# Patient Record
Sex: FEMALE | Race: BLACK OR AFRICAN AMERICAN | NOT HISPANIC OR LATINO | Employment: PART TIME | URBAN - METROPOLITAN AREA
[De-identification: names, ages, dates, MRNs, and addresses within clinical notes are randomized per-mention and may not be internally consistent; named-entity substitution may affect disease eponyms.]

---

## 2019-06-13 ENCOUNTER — HOSPITAL ENCOUNTER (OUTPATIENT)
Dept: RADIOLOGY | Facility: HOSPITAL | Age: 49
Discharge: HOME | End: 2019-06-13
Attending: PODIATRIST
Payer: MEDICAID

## 2019-06-13 DIAGNOSIS — M79.672 LEFT FOOT PAIN: Primary | ICD-10-CM

## 2019-06-13 DIAGNOSIS — M79.672 LEFT FOOT PAIN: ICD-10-CM

## 2019-06-13 PROCEDURE — 73630 X-RAY EXAM OF FOOT: CPT | Mod: TC,FY,LT

## 2019-06-13 PROCEDURE — 73630 XR FOOT COMPLETE 3 VIEW LEFT: ICD-10-PCS | Mod: 26,LT,, | Performed by: RADIOLOGY

## 2019-06-13 PROCEDURE — 73630 X-RAY EXAM OF FOOT: CPT | Mod: 26,LT,, | Performed by: RADIOLOGY

## 2022-08-16 ENCOUNTER — OCCUPATIONAL HEALTH (OUTPATIENT)
Dept: URGENT CARE | Facility: CLINIC | Age: 52
End: 2022-08-16

## 2022-08-16 DIAGNOSIS — Z13.9 ENCOUNTER FOR SCREENING: Primary | ICD-10-CM

## 2022-08-16 PROCEDURE — 86580 TB INTRADERMAL TEST: CPT | Mod: S$GLB,,, | Performed by: PHYSICIAN ASSISTANT

## 2022-08-16 PROCEDURE — 86580 POCT TB SKIN TEST: ICD-10-PCS | Mod: S$GLB,,, | Performed by: PHYSICIAN ASSISTANT

## 2022-08-18 LAB
TB INDURATION - 48 HR READ: 0 MM
TB INDURATION - 72 HR READ: 0 MM
TB SKIN TEST - 48 HR READ: NEGATIVE
TB SKIN TEST - 72 HR READ: NEGATIVE

## 2024-01-29 ENCOUNTER — ANESTHESIA (OUTPATIENT)
Dept: SURGERY | Facility: HOSPITAL | Age: 54
DRG: 004 | End: 2024-01-29
Payer: MEDICAID

## 2024-01-29 ENCOUNTER — HOSPITAL ENCOUNTER (INPATIENT)
Facility: HOSPITAL | Age: 54
LOS: 36 days | Discharge: NURSING, REHAB OR LONG-TERM CARE | DRG: 004 | End: 2024-03-05
Attending: EMERGENCY MEDICINE | Admitting: INTERNAL MEDICINE
Payer: MEDICAID

## 2024-01-29 ENCOUNTER — ANESTHESIA EVENT (OUTPATIENT)
Dept: SURGERY | Facility: HOSPITAL | Age: 54
DRG: 004 | End: 2024-01-29
Payer: MEDICAID

## 2024-01-29 DIAGNOSIS — J96.01 ACUTE HYPOXEMIC RESPIRATORY FAILURE: ICD-10-CM

## 2024-01-29 DIAGNOSIS — I38 ENDOCARDITIS, UNSPECIFIED CHRONICITY, UNSPECIFIED ENDOCARDITIS TYPE: ICD-10-CM

## 2024-01-29 DIAGNOSIS — G93.40 ENCEPHALOPATHY: ICD-10-CM

## 2024-01-29 DIAGNOSIS — R50.9 FEVER, UNSPECIFIED FEVER CAUSE: ICD-10-CM

## 2024-01-29 DIAGNOSIS — E11.9 NEW ONSET TYPE 2 DIABETES MELLITUS: ICD-10-CM

## 2024-01-29 DIAGNOSIS — N17.0 ACUTE RENAL FAILURE WITH TUBULAR NECROSIS: ICD-10-CM

## 2024-01-29 DIAGNOSIS — I63.10 CEREBROVASCULAR ACCIDENT (CVA) DUE TO EMBOLISM OF PRECEREBRAL ARTERY: ICD-10-CM

## 2024-01-29 DIAGNOSIS — I63.9 STROKE: ICD-10-CM

## 2024-01-29 DIAGNOSIS — I63.9 CEREBRAL INFARCTION, UNSPECIFIED MECHANISM: ICD-10-CM

## 2024-01-29 DIAGNOSIS — G93.41 ENCEPHALOPATHY, METABOLIC: ICD-10-CM

## 2024-01-29 DIAGNOSIS — R94.31 PROLONGED QT INTERVAL: ICD-10-CM

## 2024-01-29 DIAGNOSIS — Z99.11 ENCOUNTER FOR WEANING FROM VENTILATOR: ICD-10-CM

## 2024-01-29 DIAGNOSIS — E13.10 DIABETIC KETOACIDOSIS WITHOUT COMA ASSOCIATED WITH OTHER SPECIFIED DIABETES MELLITUS: Primary | ICD-10-CM

## 2024-01-29 DIAGNOSIS — E66.01 MORBID (SEVERE) OBESITY DUE TO EXCESS CALORIES: ICD-10-CM

## 2024-01-29 DIAGNOSIS — R94.31 ST ELEVATION: ICD-10-CM

## 2024-01-29 DIAGNOSIS — N17.9 AKI (ACUTE KIDNEY INJURY): ICD-10-CM

## 2024-01-29 DIAGNOSIS — R00.0 TACHYCARDIA: ICD-10-CM

## 2024-01-29 DIAGNOSIS — N49.3 FOURNIER'S GANGRENE: ICD-10-CM

## 2024-01-29 DIAGNOSIS — I63.9 CEREBRAL INFARCT: ICD-10-CM

## 2024-01-29 DIAGNOSIS — Z99.11 ON MECHANICALLY ASSISTED VENTILATION: ICD-10-CM

## 2024-01-29 DIAGNOSIS — D72.829 LEUKOCYTOSIS, UNSPECIFIED TYPE: ICD-10-CM

## 2024-01-29 DIAGNOSIS — E87.20 METABOLIC ACIDOSIS: ICD-10-CM

## 2024-01-29 DIAGNOSIS — D62 ACUTE BLOOD LOSS ANEMIA: ICD-10-CM

## 2024-01-29 PROBLEM — E11.10 DIABETIC ACIDOSIS WITHOUT COMA: Status: ACTIVE | Noted: 2024-01-29

## 2024-01-29 PROBLEM — R65.20 SEVERE SEPSIS: Status: ACTIVE | Noted: 2024-01-29

## 2024-01-29 PROBLEM — A41.9 SEVERE SEPSIS: Status: ACTIVE | Noted: 2024-01-29

## 2024-01-29 LAB
ALBUMIN SERPL BCP-MCNC: 2.6 G/DL (ref 3.5–5.2)
ALLENS TEST: ABNORMAL
ALLENS TEST: ABNORMAL
ALP SERPL-CCNC: 151 U/L (ref 55–135)
ALT SERPL W/O P-5'-P-CCNC: 30 U/L (ref 10–44)
ANION GAP SERPL CALC-SCNC: 19 MMOL/L (ref 8–16)
AST SERPL-CCNC: 40 U/L (ref 10–40)
B-OH-BUTYR BLD STRIP-SCNC: 3.2 MMOL/L (ref 0–0.5)
BASOPHILS # BLD AUTO: 0.13 K/UL (ref 0–0.2)
BASOPHILS NFR BLD: 0.6 % (ref 0–1.9)
BILIRUB SERPL-MCNC: 0.7 MG/DL (ref 0.1–1)
BUN SERPL-MCNC: 44 MG/DL (ref 6–20)
CALCIUM SERPL-MCNC: 8.9 MG/DL (ref 8.7–10.5)
CHLORIDE SERPL-SCNC: 94 MMOL/L (ref 95–110)
CO2 SERPL-SCNC: 17 MMOL/L (ref 23–29)
CREAT SERPL-MCNC: 3.8 MG/DL (ref 0.5–1.4)
CRP SERPL-MCNC: 530.2 MG/L (ref 0–8.2)
CTP QC/QA: YES
CTP QC/QA: YES
DELSYS: ABNORMAL
DELSYS: ABNORMAL
DIFFERENTIAL METHOD BLD: ABNORMAL
EOSINOPHIL # BLD AUTO: 0.1 K/UL (ref 0–0.5)
EOSINOPHIL NFR BLD: 0.5 % (ref 0–8)
ERYTHROCYTE [DISTWIDTH] IN BLOOD BY AUTOMATED COUNT: 14.9 % (ref 11.5–14.5)
EST. GFR  (NO RACE VARIABLE): 14 ML/MIN/1.73 M^2
GLUCOSE SERPL-MCNC: 824 MG/DL (ref 70–110)
HCO3 UR-SCNC: 19.8 MMOL/L (ref 24–28)
HCT VFR BLD AUTO: 38.5 % (ref 37–48.5)
HGB BLD-MCNC: 12 G/DL (ref 12–16)
IMM GRANULOCYTES # BLD AUTO: 0.25 K/UL (ref 0–0.04)
IMM GRANULOCYTES NFR BLD AUTO: 1.2 % (ref 0–0.5)
LDH SERPL L TO P-CCNC: 5.64 MMOL/L (ref 0.5–2.2)
LYMPHOCYTES # BLD AUTO: 1.2 K/UL (ref 1–4.8)
LYMPHOCYTES NFR BLD: 5.6 % (ref 18–48)
MCH RBC QN AUTO: 26.1 PG (ref 27–31)
MCHC RBC AUTO-ENTMCNC: 31.2 G/DL (ref 32–36)
MCV RBC AUTO: 84 FL (ref 82–98)
MONOCYTES # BLD AUTO: 0.9 K/UL (ref 0.3–1)
MONOCYTES NFR BLD: 4.3 % (ref 4–15)
NEUTROPHILS # BLD AUTO: 19 K/UL (ref 1.8–7.7)
NEUTROPHILS NFR BLD: 87.8 % (ref 38–73)
NRBC BLD-RTO: 0 /100 WBC
PCO2 BLDA: 40.9 MMHG (ref 35–45)
PH SMN: 7.29 [PH] (ref 7.35–7.45)
PLATELET # BLD AUTO: 217 K/UL (ref 150–450)
PMV BLD AUTO: 11.5 FL (ref 9.2–12.9)
PO2 BLDA: 28 MMHG (ref 40–60)
POC BE: -6 MMOL/L
POC MOLECULAR INFLUENZA A AGN: NEGATIVE
POC MOLECULAR INFLUENZA B AGN: NEGATIVE
POC SATURATED O2: 46 % (ref 95–100)
POC TCO2: 21 MMOL/L (ref 24–29)
POCT GLUCOSE: >500 MG/DL (ref 70–110)
POTASSIUM SERPL-SCNC: 4.2 MMOL/L (ref 3.5–5.1)
PROT SERPL-MCNC: 8 G/DL (ref 6–8.4)
RBC # BLD AUTO: 4.59 M/UL (ref 4–5.4)
SAMPLE: ABNORMAL
SAMPLE: ABNORMAL
SARS-COV-2 RDRP RESP QL NAA+PROBE: NEGATIVE
SITE: ABNORMAL
SITE: ABNORMAL
SODIUM SERPL-SCNC: 130 MMOL/L (ref 136–145)
WBC # BLD AUTO: 21.6 K/UL (ref 3.9–12.7)

## 2024-01-29 PROCEDURE — 86140 C-REACTIVE PROTEIN: CPT | Performed by: EMERGENCY MEDICINE

## 2024-01-29 PROCEDURE — 93005 ELECTROCARDIOGRAM TRACING: CPT

## 2024-01-29 PROCEDURE — 25000003 PHARM REV CODE 250: Performed by: EMERGENCY MEDICINE

## 2024-01-29 PROCEDURE — 83605 ASSAY OF LACTIC ACID: CPT

## 2024-01-29 PROCEDURE — 63600175 PHARM REV CODE 636 W HCPCS: Performed by: EMERGENCY MEDICINE

## 2024-01-29 PROCEDURE — 11005 DBRDMT SKIN ABDOMINAL WALL: CPT | Mod: ,,, | Performed by: SURGERY

## 2024-01-29 PROCEDURE — D9220A PRA ANESTHESIA: Mod: CRNA,,, | Performed by: NURSE ANESTHETIST, CERTIFIED REGISTERED

## 2024-01-29 PROCEDURE — 85025 COMPLETE CBC W/AUTO DIFF WBC: CPT | Performed by: EMERGENCY MEDICINE

## 2024-01-29 PROCEDURE — 63600175 PHARM REV CODE 636 W HCPCS: Performed by: NURSE ANESTHETIST, CERTIFIED REGISTERED

## 2024-01-29 PROCEDURE — 87502 INFLUENZA DNA AMP PROBE: CPT

## 2024-01-29 PROCEDURE — 93010 ELECTROCARDIOGRAM REPORT: CPT | Mod: ,,, | Performed by: INTERNAL MEDICINE

## 2024-01-29 PROCEDURE — 36000704 HC OR TIME LEV I 1ST 15 MIN: Performed by: SURGERY

## 2024-01-29 PROCEDURE — 96375 TX/PRO/DX INJ NEW DRUG ADDON: CPT

## 2024-01-29 PROCEDURE — D9220A PRA ANESTHESIA: Mod: ANES,,, | Performed by: ANESTHESIOLOGY

## 2024-01-29 PROCEDURE — 99900035 HC TECH TIME PER 15 MIN (STAT)

## 2024-01-29 PROCEDURE — 36000705 HC OR TIME LEV I EA ADD 15 MIN: Performed by: SURGERY

## 2024-01-29 PROCEDURE — 25000003 PHARM REV CODE 250: Performed by: NURSE ANESTHETIST, CERTIFIED REGISTERED

## 2024-01-29 PROCEDURE — 37000008 HC ANESTHESIA 1ST 15 MINUTES: Performed by: SURGERY

## 2024-01-29 PROCEDURE — 87635 SARS-COV-2 COVID-19 AMP PRB: CPT | Performed by: EMERGENCY MEDICINE

## 2024-01-29 PROCEDURE — 80053 COMPREHEN METABOLIC PANEL: CPT | Performed by: EMERGENCY MEDICINE

## 2024-01-29 PROCEDURE — 37000009 HC ANESTHESIA EA ADD 15 MINS: Performed by: SURGERY

## 2024-01-29 PROCEDURE — 82010 KETONE BODYS QUAN: CPT | Performed by: EMERGENCY MEDICINE

## 2024-01-29 PROCEDURE — 99285 EMERGENCY DEPT VISIT HI MDM: CPT | Mod: 25

## 2024-01-29 PROCEDURE — 99223 1ST HOSP IP/OBS HIGH 75: CPT | Mod: 25,,, | Performed by: SURGERY

## 2024-01-29 PROCEDURE — 87040 BLOOD CULTURE FOR BACTERIA: CPT | Mod: 59 | Performed by: EMERGENCY MEDICINE

## 2024-01-29 PROCEDURE — 82803 BLOOD GASES ANY COMBINATION: CPT

## 2024-01-29 PROCEDURE — 82962 GLUCOSE BLOOD TEST: CPT

## 2024-01-29 PROCEDURE — 20000000 HC ICU ROOM

## 2024-01-29 PROCEDURE — 96365 THER/PROPH/DIAG IV INF INIT: CPT

## 2024-01-29 RX ORDER — SUCCINYLCHOLINE CHLORIDE 20 MG/ML
INJECTION INTRAMUSCULAR; INTRAVENOUS
Status: DISCONTINUED | OUTPATIENT
Start: 2024-01-29 | End: 2024-01-30

## 2024-01-29 RX ORDER — DEXTROSE MONOHYDRATE 100 MG/ML
INJECTION, SOLUTION INTRAVENOUS
Status: DISCONTINUED | OUTPATIENT
Start: 2024-01-29 | End: 2024-02-02

## 2024-01-29 RX ORDER — SODIUM CHLORIDE 0.9 % (FLUSH) 0.9 %
10 SYRINGE (ML) INJECTION
Status: DISCONTINUED | OUTPATIENT
Start: 2024-01-29 | End: 2024-01-30

## 2024-01-29 RX ORDER — MORPHINE SULFATE 4 MG/ML
4 INJECTION, SOLUTION INTRAMUSCULAR; INTRAVENOUS
Status: COMPLETED | OUTPATIENT
Start: 2024-01-29 | End: 2024-01-29

## 2024-01-29 RX ORDER — CLINDAMYCIN PHOSPHATE 900 MG/50ML
900 INJECTION, SOLUTION INTRAVENOUS
Status: COMPLETED | OUTPATIENT
Start: 2024-01-29 | End: 2024-01-29

## 2024-01-29 RX ORDER — MIDAZOLAM HYDROCHLORIDE 1 MG/ML
INJECTION, SOLUTION INTRAMUSCULAR; INTRAVENOUS
Status: DISCONTINUED | OUTPATIENT
Start: 2024-01-29 | End: 2024-01-30

## 2024-01-29 RX ORDER — PROPOFOL 10 MG/ML
VIAL (ML) INTRAVENOUS
Status: DISCONTINUED | OUTPATIENT
Start: 2024-01-29 | End: 2024-01-30

## 2024-01-29 RX ORDER — ROCURONIUM BROMIDE 10 MG/ML
INJECTION, SOLUTION INTRAVENOUS
Status: DISCONTINUED | OUTPATIENT
Start: 2024-01-29 | End: 2024-01-30

## 2024-01-29 RX ORDER — PROCHLORPERAZINE EDISYLATE 5 MG/ML
INJECTION INTRAMUSCULAR; INTRAVENOUS
Status: DISCONTINUED | OUTPATIENT
Start: 2024-01-29 | End: 2024-01-30

## 2024-01-29 RX ORDER — DEXTROSE MONOHYDRATE AND SODIUM CHLORIDE 5; .45 G/100ML; G/100ML
INJECTION, SOLUTION INTRAVENOUS CONTINUOUS PRN
Status: DISCONTINUED | OUTPATIENT
Start: 2024-01-29 | End: 2024-01-30

## 2024-01-29 RX ORDER — FENTANYL CITRATE 50 UG/ML
INJECTION, SOLUTION INTRAMUSCULAR; INTRAVENOUS
Status: DISCONTINUED | OUTPATIENT
Start: 2024-01-29 | End: 2024-01-30

## 2024-01-29 RX ORDER — LIDOCAINE HYDROCHLORIDE 20 MG/ML
INJECTION INTRAVENOUS
Status: DISCONTINUED | OUTPATIENT
Start: 2024-01-29 | End: 2024-01-30

## 2024-01-29 RX ORDER — SODIUM CHLORIDE 9 MG/ML
1000 INJECTION, SOLUTION INTRAVENOUS CONTINUOUS
Status: DISCONTINUED | OUTPATIENT
Start: 2024-01-29 | End: 2024-01-30

## 2024-01-29 RX ORDER — ONDANSETRON HYDROCHLORIDE 2 MG/ML
INJECTION, SOLUTION INTRAVENOUS
Status: DISCONTINUED | OUTPATIENT
Start: 2024-01-29 | End: 2024-01-30

## 2024-01-29 RX ORDER — ONDANSETRON HYDROCHLORIDE 2 MG/ML
4 INJECTION, SOLUTION INTRAVENOUS
Status: COMPLETED | OUTPATIENT
Start: 2024-01-29 | End: 2024-01-29

## 2024-01-29 RX ADMIN — SUGAMMADEX 200 MG: 100 INJECTION, SOLUTION INTRAVENOUS at 11:01

## 2024-01-29 RX ADMIN — ONDANSETRON 4 MG: 2 INJECTION INTRAMUSCULAR; INTRAVENOUS at 07:01

## 2024-01-29 RX ADMIN — SODIUM CHLORIDE: 0.9 INJECTION, SOLUTION INTRAVENOUS at 10:01

## 2024-01-29 RX ADMIN — ONDANSETRON 4 MG: 2 INJECTION, SOLUTION INTRAMUSCULAR; INTRAVENOUS at 10:01

## 2024-01-29 RX ADMIN — CLINDAMYCIN PHOSPHATE 900 MG: 900 INJECTION, SOLUTION INTRAVENOUS at 09:01

## 2024-01-29 RX ADMIN — FENTANYL CITRATE 50 MCG: 0.05 INJECTION, SOLUTION INTRAMUSCULAR; INTRAVENOUS at 10:01

## 2024-01-29 RX ADMIN — PIPERACILLIN AND TAZOBACTAM 4.5 G: 4; .5 INJECTION, POWDER, LYOPHILIZED, FOR SOLUTION INTRAVENOUS; PARENTERAL at 07:01

## 2024-01-29 RX ADMIN — MIDAZOLAM HYDROCHLORIDE 2 MG: 1 INJECTION, SOLUTION INTRAMUSCULAR; INTRAVENOUS at 10:01

## 2024-01-29 RX ADMIN — PROPOFOL 200 MG: 10 INJECTION, EMULSION INTRAVENOUS at 10:01

## 2024-01-29 RX ADMIN — ROCURONIUM BROMIDE 30 MG: 10 INJECTION, SOLUTION INTRAVENOUS at 10:01

## 2024-01-29 RX ADMIN — SODIUM CHLORIDE 1000 ML: 0.9 INJECTION, SOLUTION INTRAVENOUS at 09:01

## 2024-01-29 RX ADMIN — LIDOCAINE HYDROCHLORIDE 100 MG: 20 INJECTION, SOLUTION INTRAVENOUS at 10:01

## 2024-01-29 RX ADMIN — PROCHLORPERAZINE EDISYLATE 5 MG: 5 INJECTION INTRAMUSCULAR; INTRAVENOUS at 10:01

## 2024-01-29 RX ADMIN — SODIUM CHLORIDE: 0.9 INJECTION, SOLUTION INTRAVENOUS at 11:01

## 2024-01-29 RX ADMIN — INSULIN HUMAN 0.1 UNITS/KG/HR: 1 INJECTION, SOLUTION INTRAVENOUS at 09:01

## 2024-01-29 RX ADMIN — SUCCINYLCHOLINE CHLORIDE 200 MG: 20 INJECTION, SOLUTION INTRAMUSCULAR; INTRAVENOUS at 10:01

## 2024-01-29 RX ADMIN — SODIUM CHLORIDE 1779 ML: 9 INJECTION, SOLUTION INTRAVENOUS at 07:01

## 2024-01-29 RX ADMIN — MORPHINE SULFATE 4 MG: 4 INJECTION, SOLUTION INTRAMUSCULAR; INTRAVENOUS at 07:01

## 2024-01-29 NOTE — Clinical Note
Diagnosis: Diabetic ketoacidosis without coma associated with other specified diabetes mellitus [1495572]   Future Attending Provider: PRIMITIVO BRUNSON [[id number]]   Reason for IP Medical Treatment  (Clinical interventions that can only be accomplished in the IP setting? ) :: iv insulin, abx, surgery   I certify that Inpatient services for greater than or equal to 2 midnights are medically necessary:: Yes   Plans for Post-Acute care--if anticipated (pick the single best option):: C. Discharge home with home health services   Special Needs:: No Special Needs [1]   [facility] Medicine Provider Team:: Staff

## 2024-01-30 ENCOUNTER — ANESTHESIA EVENT (OUTPATIENT)
Dept: SURGERY | Facility: HOSPITAL | Age: 54
DRG: 004 | End: 2024-01-30
Payer: MEDICAID

## 2024-01-30 PROBLEM — N17.9 AKI (ACUTE KIDNEY INJURY): Status: ACTIVE | Noted: 2024-01-30

## 2024-01-30 PROBLEM — E87.1 HYPONATREMIA: Status: ACTIVE | Noted: 2024-01-30

## 2024-01-30 LAB
ALLENS TEST: ABNORMAL
ALLENS TEST: ABNORMAL
ANION GAP SERPL CALC-SCNC: 11 MMOL/L (ref 8–16)
ANION GAP SERPL CALC-SCNC: 12 MMOL/L (ref 8–16)
ANION GAP SERPL CALC-SCNC: 14 MMOL/L (ref 8–16)
ANION GAP SERPL CALC-SCNC: 14 MMOL/L (ref 8–16)
ANION GAP SERPL CALC-SCNC: 16 MMOL/L (ref 8–16)
ANION GAP SERPL CALC-SCNC: 9 MMOL/L (ref 8–16)
B-OH-BUTYR BLD STRIP-SCNC: 0.8 MMOL/L (ref 0–0.5)
BACTERIA #/AREA URNS HPF: ABNORMAL /HPF
BASOPHILS # BLD AUTO: 0.04 K/UL (ref 0–0.2)
BASOPHILS # BLD AUTO: 0.04 K/UL (ref 0–0.2)
BASOPHILS NFR BLD: 0.2 % (ref 0–1.9)
BASOPHILS NFR BLD: 0.3 % (ref 0–1.9)
BILIRUB UR QL STRIP: NEGATIVE
BUN SERPL-MCNC: 44 MG/DL (ref 6–20)
BUN SERPL-MCNC: 46 MG/DL (ref 6–20)
BUN SERPL-MCNC: 47 MG/DL (ref 6–20)
BUN SERPL-MCNC: 47 MG/DL (ref 6–20)
BUN SERPL-MCNC: 50 MG/DL (ref 6–20)
BUN SERPL-MCNC: 50 MG/DL (ref 6–20)
CALCIUM SERPL-MCNC: 7.5 MG/DL (ref 8.7–10.5)
CALCIUM SERPL-MCNC: 7.7 MG/DL (ref 8.7–10.5)
CALCIUM SERPL-MCNC: 7.9 MG/DL (ref 8.7–10.5)
CALCIUM SERPL-MCNC: 8.1 MG/DL (ref 8.7–10.5)
CALCIUM SERPL-MCNC: 8.3 MG/DL (ref 8.7–10.5)
CALCIUM SERPL-MCNC: 8.3 MG/DL (ref 8.7–10.5)
CHLORIDE SERPL-SCNC: 103 MMOL/L (ref 95–110)
CHLORIDE SERPL-SCNC: 103 MMOL/L (ref 95–110)
CHLORIDE SERPL-SCNC: 105 MMOL/L (ref 95–110)
CHLORIDE SERPL-SCNC: 105 MMOL/L (ref 95–110)
CHLORIDE SERPL-SCNC: 106 MMOL/L (ref 95–110)
CHLORIDE SERPL-SCNC: 109 MMOL/L (ref 95–110)
CLARITY UR: ABNORMAL
CO2 SERPL-SCNC: 15 MMOL/L (ref 23–29)
CO2 SERPL-SCNC: 16 MMOL/L (ref 23–29)
CO2 SERPL-SCNC: 17 MMOL/L (ref 23–29)
CO2 SERPL-SCNC: 17 MMOL/L (ref 23–29)
CO2 SERPL-SCNC: 18 MMOL/L (ref 23–29)
CO2 SERPL-SCNC: 20 MMOL/L (ref 23–29)
COLOR UR: ABNORMAL
CREAT SERPL-MCNC: 3.5 MG/DL (ref 0.5–1.4)
CREAT SERPL-MCNC: 3.6 MG/DL (ref 0.5–1.4)
CREAT SERPL-MCNC: 3.8 MG/DL (ref 0.5–1.4)
CREAT SERPL-MCNC: 3.9 MG/DL (ref 0.5–1.4)
CREAT SERPL-MCNC: 4.4 MG/DL (ref 0.5–1.4)
CREAT SERPL-MCNC: 4.6 MG/DL (ref 0.5–1.4)
CREAT UR-MCNC: 274.8 MG/DL (ref 15–325)
DELSYS: ABNORMAL
DELSYS: ABNORMAL
DIFFERENTIAL METHOD BLD: ABNORMAL
DIFFERENTIAL METHOD BLD: ABNORMAL
EOSINOPHIL # BLD AUTO: 0 K/UL (ref 0–0.5)
EOSINOPHIL # BLD AUTO: 0 K/UL (ref 0–0.5)
EOSINOPHIL NFR BLD: 0.1 % (ref 0–8)
EOSINOPHIL NFR BLD: 0.1 % (ref 0–8)
ERYTHROCYTE [DISTWIDTH] IN BLOOD BY AUTOMATED COUNT: 14.6 % (ref 11.5–14.5)
ERYTHROCYTE [DISTWIDTH] IN BLOOD BY AUTOMATED COUNT: 14.8 % (ref 11.5–14.5)
EST. GFR  (NO RACE VARIABLE): 11 ML/MIN/1.73 M^2
EST. GFR  (NO RACE VARIABLE): 11 ML/MIN/1.73 M^2
EST. GFR  (NO RACE VARIABLE): 13 ML/MIN/1.73 M^2
EST. GFR  (NO RACE VARIABLE): 14 ML/MIN/1.73 M^2
EST. GFR  (NO RACE VARIABLE): 14 ML/MIN/1.73 M^2
EST. GFR  (NO RACE VARIABLE): 15 ML/MIN/1.73 M^2
ESTIMATED AVG GLUCOSE: 252 MG/DL (ref 68–131)
FLOW: 2
GLUCOSE SERPL-MCNC: 125 MG/DL (ref 70–110)
GLUCOSE SERPL-MCNC: 137 MG/DL (ref 70–110)
GLUCOSE SERPL-MCNC: 167 MG/DL (ref 70–110)
GLUCOSE SERPL-MCNC: 198 MG/DL (ref 70–110)
GLUCOSE SERPL-MCNC: 412 MG/DL (ref 70–110)
GLUCOSE SERPL-MCNC: 571 MG/DL (ref 70–110)
GLUCOSE UR QL STRIP: ABNORMAL
GRAM STN SPEC: NORMAL
HBA1C MFR BLD: 10.4 % (ref 4–5.6)
HCO3 UR-SCNC: 19.9 MMOL/L (ref 24–28)
HCO3 UR-SCNC: 21.6 MMOL/L (ref 24–28)
HCT VFR BLD AUTO: 37.9 % (ref 37–48.5)
HCT VFR BLD AUTO: 42.1 % (ref 37–48.5)
HGB BLD-MCNC: 12 G/DL (ref 12–16)
HGB BLD-MCNC: 13 G/DL (ref 12–16)
HGB UR QL STRIP: ABNORMAL
HYALINE CASTS #/AREA URNS LPF: 0 /LPF
IMM GRANULOCYTES # BLD AUTO: 0.11 K/UL (ref 0–0.04)
IMM GRANULOCYTES # BLD AUTO: 0.12 K/UL (ref 0–0.04)
IMM GRANULOCYTES NFR BLD AUTO: 0.6 % (ref 0–0.5)
IMM GRANULOCYTES NFR BLD AUTO: 0.8 % (ref 0–0.5)
KETONES UR QL STRIP: ABNORMAL
LACTATE SERPL-SCNC: 2.5 MMOL/L (ref 0.5–2.2)
LACTATE SERPL-SCNC: 2.6 MMOL/L (ref 0.5–2.2)
LACTATE SERPL-SCNC: 4.9 MMOL/L (ref 0.5–2.2)
LEUKOCYTE ESTERASE UR QL STRIP: NEGATIVE
LYMPHOCYTES # BLD AUTO: 1.3 K/UL (ref 1–4.8)
LYMPHOCYTES # BLD AUTO: 1.3 K/UL (ref 1–4.8)
LYMPHOCYTES NFR BLD: 7.1 % (ref 18–48)
LYMPHOCYTES NFR BLD: 9 % (ref 18–48)
MCH RBC QN AUTO: 25.8 PG (ref 27–31)
MCH RBC QN AUTO: 26.4 PG (ref 27–31)
MCHC RBC AUTO-ENTMCNC: 30.9 G/DL (ref 32–36)
MCHC RBC AUTO-ENTMCNC: 31.7 G/DL (ref 32–36)
MCV RBC AUTO: 84 FL (ref 82–98)
MCV RBC AUTO: 84 FL (ref 82–98)
MICROSCOPIC COMMENT: ABNORMAL
MODE: ABNORMAL
MONOCYTES # BLD AUTO: 0.5 K/UL (ref 0.3–1)
MONOCYTES # BLD AUTO: 0.5 K/UL (ref 0.3–1)
MONOCYTES NFR BLD: 2.8 % (ref 4–15)
MONOCYTES NFR BLD: 3.5 % (ref 4–15)
NEUTROPHILS # BLD AUTO: 12.7 K/UL (ref 1.8–7.7)
NEUTROPHILS # BLD AUTO: 15.8 K/UL (ref 1.8–7.7)
NEUTROPHILS NFR BLD: 86.3 % (ref 38–73)
NEUTROPHILS NFR BLD: 89.2 % (ref 38–73)
NITRITE UR QL STRIP: NEGATIVE
NRBC BLD-RTO: 0 /100 WBC
NRBC BLD-RTO: 0 /100 WBC
OSMOLALITY UR: 322 MOSM/KG (ref 50–1200)
PCO2 BLDA: 37.9 MMHG (ref 35–45)
PCO2 BLDA: 46.3 MMHG (ref 35–45)
PH SMN: 7.28 [PH] (ref 7.35–7.45)
PH SMN: 7.33 [PH] (ref 7.35–7.45)
PH UR STRIP: 6 [PH] (ref 5–8)
PHOSPHATE SERPL-MCNC: 1.9 MG/DL (ref 2.7–4.5)
PHOSPHATE SERPL-MCNC: 2.4 MG/DL (ref 2.7–4.5)
PLATELET # BLD AUTO: 186 K/UL (ref 150–450)
PLATELET # BLD AUTO: 216 K/UL (ref 150–450)
PMV BLD AUTO: 12 FL (ref 9.2–12.9)
PMV BLD AUTO: 12.1 FL (ref 9.2–12.9)
PO2 BLDA: 19 MMHG (ref 40–60)
PO2 BLDA: 89 MMHG (ref 80–100)
POC BE: -5 MMOL/L
POC BE: -6 MMOL/L
POC SATURATED O2: 23 % (ref 95–100)
POC SATURATED O2: 96 % (ref 95–100)
POC TCO2: 21 MMOL/L (ref 23–27)
POC TCO2: 23 MMOL/L (ref 24–29)
POCT GLUCOSE: 118 MG/DL (ref 70–110)
POCT GLUCOSE: 125 MG/DL (ref 70–110)
POCT GLUCOSE: 136 MG/DL (ref 70–110)
POCT GLUCOSE: 137 MG/DL (ref 70–110)
POCT GLUCOSE: 141 MG/DL (ref 70–110)
POCT GLUCOSE: 141 MG/DL (ref 70–110)
POCT GLUCOSE: 143 MG/DL (ref 70–110)
POCT GLUCOSE: 143 MG/DL (ref 70–110)
POCT GLUCOSE: 153 MG/DL (ref 70–110)
POCT GLUCOSE: 155 MG/DL (ref 70–110)
POCT GLUCOSE: 156 MG/DL (ref 70–110)
POCT GLUCOSE: 157 MG/DL (ref 70–110)
POCT GLUCOSE: 160 MG/DL (ref 70–110)
POCT GLUCOSE: 210 MG/DL (ref 70–110)
POCT GLUCOSE: 216 MG/DL (ref 70–110)
POCT GLUCOSE: 360 MG/DL (ref 70–110)
POCT GLUCOSE: 430 MG/DL (ref 70–110)
POCT GLUCOSE: 439 MG/DL (ref 70–110)
POCT GLUCOSE: 458 MG/DL (ref 70–110)
POCT GLUCOSE: 489 MG/DL (ref 70–110)
POCT GLUCOSE: >500 MG/DL (ref 70–110)
POTASSIUM SERPL-SCNC: 3.5 MMOL/L (ref 3.5–5.1)
POTASSIUM SERPL-SCNC: 3.6 MMOL/L (ref 3.5–5.1)
POTASSIUM SERPL-SCNC: 3.8 MMOL/L (ref 3.5–5.1)
POTASSIUM SERPL-SCNC: 3.9 MMOL/L (ref 3.5–5.1)
POTASSIUM SERPL-SCNC: 3.9 MMOL/L (ref 3.5–5.1)
POTASSIUM SERPL-SCNC: 4.1 MMOL/L (ref 3.5–5.1)
PROT UR QL STRIP: ABNORMAL
RBC # BLD AUTO: 4.54 M/UL (ref 4–5.4)
RBC # BLD AUTO: 5.03 M/UL (ref 4–5.4)
RBC #/AREA URNS HPF: 74 /HPF (ref 0–4)
SAMPLE: ABNORMAL
SAMPLE: ABNORMAL
SITE: ABNORMAL
SITE: ABNORMAL
SODIUM SERPL-SCNC: 134 MMOL/L (ref 136–145)
SODIUM SERPL-SCNC: 136 MMOL/L (ref 136–145)
SODIUM UR-SCNC: <20 MMOL/L (ref 20–250)
SP GR UR STRIP: 1.02 (ref 1–1.03)
SP02: 100
SQUAMOUS #/AREA URNS HPF: 2 /HPF
URN SPEC COLLECT METH UR: ABNORMAL
UROBILINOGEN UR STRIP-ACNC: NEGATIVE EU/DL
VANCOMYCIN SERPL-MCNC: 30.5 UG/ML
WBC # BLD AUTO: 14.73 K/UL (ref 3.9–12.7)
WBC # BLD AUTO: 17.67 K/UL (ref 3.9–12.7)
WBC #/AREA URNS HPF: 2 /HPF (ref 0–5)
YEAST URNS QL MICRO: ABNORMAL

## 2024-01-30 PROCEDURE — 36415 COLL VENOUS BLD VENIPUNCTURE: CPT | Mod: XB | Performed by: STUDENT IN AN ORGANIZED HEALTH CARE EDUCATION/TRAINING PROGRAM

## 2024-01-30 PROCEDURE — 87075 CULTR BACTERIA EXCEPT BLOOD: CPT | Mod: 59 | Performed by: HOSPITALIST

## 2024-01-30 PROCEDURE — 87116 MYCOBACTERIA CULTURE: CPT | Mod: 59 | Performed by: HOSPITALIST

## 2024-01-30 PROCEDURE — 82803 BLOOD GASES ANY COMBINATION: CPT

## 2024-01-30 PROCEDURE — 87076 CULTURE ANAEROBE IDENT EACH: CPT | Performed by: HOSPITALIST

## 2024-01-30 PROCEDURE — 82010 KETONE BODYS QUAN: CPT | Performed by: STUDENT IN AN ORGANIZED HEALTH CARE EDUCATION/TRAINING PROGRAM

## 2024-01-30 PROCEDURE — 81000 URINALYSIS NONAUTO W/SCOPE: CPT | Performed by: EMERGENCY MEDICINE

## 2024-01-30 PROCEDURE — S5010 5% DEXTROSE AND 0.45% SALINE: HCPCS | Performed by: STUDENT IN AN ORGANIZED HEALTH CARE EDUCATION/TRAINING PROGRAM

## 2024-01-30 PROCEDURE — 63600175 PHARM REV CODE 636 W HCPCS: Performed by: HOSPITALIST

## 2024-01-30 PROCEDURE — 20000000 HC ICU ROOM

## 2024-01-30 PROCEDURE — 87102 FUNGUS ISOLATION CULTURE: CPT | Performed by: HOSPITALIST

## 2024-01-30 PROCEDURE — 87077 CULTURE AEROBIC IDENTIFY: CPT | Performed by: HOSPITALIST

## 2024-01-30 PROCEDURE — 83605 ASSAY OF LACTIC ACID: CPT | Mod: 91

## 2024-01-30 PROCEDURE — 80202 ASSAY OF VANCOMYCIN: CPT | Performed by: HOSPITALIST

## 2024-01-30 PROCEDURE — 63600175 PHARM REV CODE 636 W HCPCS: Performed by: STUDENT IN AN ORGANIZED HEALTH CARE EDUCATION/TRAINING PROGRAM

## 2024-01-30 PROCEDURE — 36415 COLL VENOUS BLD VENIPUNCTURE: CPT | Mod: XB

## 2024-01-30 PROCEDURE — 87206 SMEAR FLUORESCENT/ACID STAI: CPT | Performed by: HOSPITALIST

## 2024-01-30 PROCEDURE — 25000003 PHARM REV CODE 250: Performed by: EMERGENCY MEDICINE

## 2024-01-30 PROCEDURE — 85025 COMPLETE CBC W/AUTO DIFF WBC: CPT | Performed by: STUDENT IN AN ORGANIZED HEALTH CARE EDUCATION/TRAINING PROGRAM

## 2024-01-30 PROCEDURE — 80048 BASIC METABOLIC PNL TOTAL CA: CPT | Mod: 91 | Performed by: STUDENT IN AN ORGANIZED HEALTH CARE EDUCATION/TRAINING PROGRAM

## 2024-01-30 PROCEDURE — 63600175 PHARM REV CODE 636 W HCPCS: Performed by: SURGERY

## 2024-01-30 PROCEDURE — 36600 WITHDRAWAL OF ARTERIAL BLOOD: CPT

## 2024-01-30 PROCEDURE — 84300 ASSAY OF URINE SODIUM: CPT

## 2024-01-30 PROCEDURE — 99900035 HC TECH TIME PER 15 MIN (STAT)

## 2024-01-30 PROCEDURE — 83036 HEMOGLOBIN GLYCOSYLATED A1C: CPT | Performed by: STUDENT IN AN ORGANIZED HEALTH CARE EDUCATION/TRAINING PROGRAM

## 2024-01-30 PROCEDURE — 87186 SC STD MICRODIL/AGAR DIL: CPT | Performed by: HOSPITALIST

## 2024-01-30 PROCEDURE — 87070 CULTURE OTHR SPECIMN AEROBIC: CPT | Performed by: HOSPITALIST

## 2024-01-30 PROCEDURE — 63600175 PHARM REV CODE 636 W HCPCS: Performed by: EMERGENCY MEDICINE

## 2024-01-30 PROCEDURE — 87205 SMEAR GRAM STAIN: CPT | Performed by: HOSPITALIST

## 2024-01-30 PROCEDURE — 25000003 PHARM REV CODE 250: Performed by: HOSPITALIST

## 2024-01-30 PROCEDURE — 25000003 PHARM REV CODE 250: Performed by: STUDENT IN AN ORGANIZED HEALTH CARE EDUCATION/TRAINING PROGRAM

## 2024-01-30 PROCEDURE — 27000190 HC CPAP FULL FACE MASK W/VALVE

## 2024-01-30 PROCEDURE — 25000003 PHARM REV CODE 250: Performed by: SURGERY

## 2024-01-30 PROCEDURE — 94660 CPAP INITIATION&MGMT: CPT

## 2024-01-30 PROCEDURE — 83605 ASSAY OF LACTIC ACID: CPT | Performed by: STUDENT IN AN ORGANIZED HEALTH CARE EDUCATION/TRAINING PROGRAM

## 2024-01-30 PROCEDURE — 84100 ASSAY OF PHOSPHORUS: CPT | Performed by: STUDENT IN AN ORGANIZED HEALTH CARE EDUCATION/TRAINING PROGRAM

## 2024-01-30 PROCEDURE — 27000221 HC OXYGEN, UP TO 24 HOURS

## 2024-01-30 PROCEDURE — 82570 ASSAY OF URINE CREATININE: CPT

## 2024-01-30 PROCEDURE — 83935 ASSAY OF URINE OSMOLALITY: CPT

## 2024-01-30 PROCEDURE — 94760 N-INVAS EAR/PLS OXIMETRY 1: CPT | Mod: XB

## 2024-01-30 RX ORDER — ACETAMINOPHEN 325 MG/1
650 TABLET ORAL EVERY 4 HOURS PRN
Status: DISCONTINUED | OUTPATIENT
Start: 2024-01-30 | End: 2024-01-30

## 2024-01-30 RX ORDER — SODIUM CHLORIDE 0.9 % (FLUSH) 0.9 %
10 SYRINGE (ML) INJECTION
Status: DISCONTINUED | OUTPATIENT
Start: 2024-01-30 | End: 2024-01-30

## 2024-01-30 RX ORDER — POTASSIUM CHLORIDE 7.45 MG/ML
40 INJECTION INTRAVENOUS
Status: DISCONTINUED | OUTPATIENT
Start: 2024-01-30 | End: 2024-02-05

## 2024-01-30 RX ORDER — SODIUM CHLORIDE 0.9 % (FLUSH) 0.9 %
10 SYRINGE (ML) INJECTION
Status: DISCONTINUED | OUTPATIENT
Start: 2024-01-30 | End: 2024-03-05 | Stop reason: HOSPADM

## 2024-01-30 RX ORDER — PROCHLORPERAZINE EDISYLATE 5 MG/ML
5 INJECTION INTRAMUSCULAR; INTRAVENOUS EVERY 6 HOURS PRN
Status: DISCONTINUED | OUTPATIENT
Start: 2024-01-30 | End: 2024-03-05 | Stop reason: HOSPADM

## 2024-01-30 RX ORDER — ACETAMINOPHEN 500 MG
1000 TABLET ORAL EVERY 8 HOURS
Status: DISPENSED | OUTPATIENT
Start: 2024-01-30 | End: 2024-01-31

## 2024-01-30 RX ORDER — SODIUM CHLORIDE 9 MG/ML
125 INJECTION, SOLUTION INTRAVENOUS CONTINUOUS
Status: DISCONTINUED | OUTPATIENT
Start: 2024-01-30 | End: 2024-01-31

## 2024-01-30 RX ORDER — ACETAMINOPHEN 650 MG/1
650 SUPPOSITORY RECTAL EVERY 6 HOURS PRN
Status: DISCONTINUED | OUTPATIENT
Start: 2024-01-30 | End: 2024-02-02

## 2024-01-30 RX ORDER — POTASSIUM CHLORIDE 7.45 MG/ML
60 INJECTION INTRAVENOUS
Status: DISCONTINUED | OUTPATIENT
Start: 2024-01-30 | End: 2024-02-05

## 2024-01-30 RX ORDER — IBUPROFEN 200 MG
16 TABLET ORAL
Status: DISCONTINUED | OUTPATIENT
Start: 2024-01-30 | End: 2024-02-02

## 2024-01-30 RX ORDER — TALC
6 POWDER (GRAM) TOPICAL NIGHTLY PRN
Status: DISCONTINUED | OUTPATIENT
Start: 2024-01-30 | End: 2024-02-07

## 2024-01-30 RX ORDER — CLINDAMYCIN PHOSPHATE 900 MG/50ML
900 INJECTION, SOLUTION INTRAVENOUS
Status: DISCONTINUED | OUTPATIENT
Start: 2024-01-30 | End: 2024-01-31

## 2024-01-30 RX ORDER — IBUPROFEN 200 MG
24 TABLET ORAL
Status: DISCONTINUED | OUTPATIENT
Start: 2024-01-30 | End: 2024-02-02

## 2024-01-30 RX ORDER — POTASSIUM CHLORIDE 7.45 MG/ML
80 INJECTION INTRAVENOUS
Status: DISCONTINUED | OUTPATIENT
Start: 2024-01-30 | End: 2024-02-05

## 2024-01-30 RX ORDER — HYDROMORPHONE HCL IN 0.9% NACL 6 MG/30 ML
PATIENT CONTROLLED ANALGESIA SYRINGE INTRAVENOUS CONTINUOUS
Status: DISCONTINUED | OUTPATIENT
Start: 2024-01-30 | End: 2024-01-30

## 2024-01-30 RX ORDER — ACETAMINOPHEN 325 MG/1
650 TABLET ORAL EVERY 6 HOURS PRN
Status: DISCONTINUED | OUTPATIENT
Start: 2024-01-30 | End: 2024-02-03

## 2024-01-30 RX ORDER — ACETAMINOPHEN 10 MG/ML
1000 INJECTION, SOLUTION INTRAVENOUS ONCE
Status: COMPLETED | OUTPATIENT
Start: 2024-01-30 | End: 2024-01-30

## 2024-01-30 RX ORDER — GLUCAGON 1 MG
1 KIT INJECTION
Status: DISCONTINUED | OUTPATIENT
Start: 2024-01-30 | End: 2024-02-02

## 2024-01-30 RX ORDER — MUPIROCIN 20 MG/G
OINTMENT TOPICAL 2 TIMES DAILY
Status: COMPLETED | OUTPATIENT
Start: 2024-01-30 | End: 2024-02-03

## 2024-01-30 RX ORDER — DEXTROSE MONOHYDRATE AND SODIUM CHLORIDE 5; .45 G/100ML; G/100ML
125 INJECTION, SOLUTION INTRAVENOUS CONTINUOUS PRN
Status: DISCONTINUED | OUTPATIENT
Start: 2024-01-30 | End: 2024-01-30

## 2024-01-30 RX ORDER — NALOXONE HCL 0.4 MG/ML
0.02 VIAL (ML) INJECTION
Status: DISCONTINUED | OUTPATIENT
Start: 2024-01-30 | End: 2024-03-05 | Stop reason: HOSPADM

## 2024-01-30 RX ORDER — INSULIN ASPART 100 [IU]/ML
0-5 INJECTION, SOLUTION INTRAVENOUS; SUBCUTANEOUS
Status: DISCONTINUED | OUTPATIENT
Start: 2024-01-30 | End: 2024-02-02

## 2024-01-30 RX ORDER — FAMOTIDINE 10 MG/ML
20 INJECTION INTRAVENOUS DAILY
Status: DISCONTINUED | OUTPATIENT
Start: 2024-01-30 | End: 2024-02-05

## 2024-01-30 RX ORDER — IPRATROPIUM BROMIDE AND ALBUTEROL SULFATE 2.5; .5 MG/3ML; MG/3ML
3 SOLUTION RESPIRATORY (INHALATION) EVERY 4 HOURS PRN
Status: DISCONTINUED | OUTPATIENT
Start: 2024-01-30 | End: 2024-03-05 | Stop reason: HOSPADM

## 2024-01-30 RX ORDER — CLINDAMYCIN PHOSPHATE 150 MG/ML
900 INJECTION, SOLUTION INTRAVENOUS
Status: DISCONTINUED | OUTPATIENT
Start: 2024-01-30 | End: 2024-01-30

## 2024-01-30 RX ORDER — ONDANSETRON HYDROCHLORIDE 2 MG/ML
4 INJECTION, SOLUTION INTRAVENOUS EVERY 6 HOURS PRN
Status: DISCONTINUED | OUTPATIENT
Start: 2024-01-30 | End: 2024-03-05 | Stop reason: HOSPADM

## 2024-01-30 RX ADMIN — CLINDAMYCIN PHOSPHATE 900 MG: 900 INJECTION, SOLUTION INTRAVENOUS at 08:01

## 2024-01-30 RX ADMIN — Medication: at 02:01

## 2024-01-30 RX ADMIN — DEXTROSE AND SODIUM CHLORIDE 125 ML/HR: 5; 450 INJECTION, SOLUTION INTRAVENOUS at 04:01

## 2024-01-30 RX ADMIN — MUPIROCIN: 20 OINTMENT TOPICAL at 10:01

## 2024-01-30 RX ADMIN — FAMOTIDINE 20 MG: 10 INJECTION, SOLUTION INTRAVENOUS at 08:01

## 2024-01-30 RX ADMIN — INSULIN HUMAN 0.1 UNITS/KG/HR: 1 INJECTION, SOLUTION INTRAVENOUS at 12:01

## 2024-01-30 RX ADMIN — ACETAMINOPHEN 1000 MG: 10 INJECTION, SOLUTION INTRAVENOUS at 03:01

## 2024-01-30 RX ADMIN — SODIUM CHLORIDE 500 ML: 9 INJECTION, SOLUTION INTRAVENOUS at 10:01

## 2024-01-30 RX ADMIN — DEXTROSE AND SODIUM CHLORIDE 125 ML/HR: 5; 450 INJECTION, SOLUTION INTRAVENOUS at 09:01

## 2024-01-30 RX ADMIN — SODIUM CHLORIDE 125 ML/HR: 9 INJECTION, SOLUTION INTRAVENOUS at 12:01

## 2024-01-30 RX ADMIN — SODIUM CHLORIDE 500 ML: 9 INJECTION, SOLUTION INTRAVENOUS at 04:01

## 2024-01-30 RX ADMIN — VANCOMYCIN HYDROCHLORIDE 2500 MG: 500 INJECTION, POWDER, LYOPHILIZED, FOR SOLUTION INTRAVENOUS at 12:01

## 2024-01-30 RX ADMIN — PIPERACILLIN AND TAZOBACTAM 4.5 G: 4; .5 INJECTION, POWDER, LYOPHILIZED, FOR SOLUTION INTRAVENOUS; PARENTERAL at 03:01

## 2024-01-30 RX ADMIN — MUPIROCIN: 20 OINTMENT TOPICAL at 08:01

## 2024-01-30 RX ADMIN — INSULIN HUMAN 0.05 UNITS/KG/HR: 1 INJECTION, SOLUTION INTRAVENOUS at 03:01

## 2024-01-30 RX ADMIN — CLINDAMYCIN PHOSPHATE 900 MG: 150 INJECTION, SOLUTION INTRAMUSCULAR; INTRAVENOUS at 06:01

## 2024-01-30 RX ADMIN — SODIUM PHOSPHATE, MONOBASIC, MONOHYDRATE AND SODIUM PHOSPHATE, DIBASIC, ANHYDROUS 30 MMOL: 142; 276 INJECTION, SOLUTION INTRAVENOUS at 08:01

## 2024-01-30 RX ADMIN — SODIUM PHOSPHATE, MONOBASIC, MONOHYDRATE AND SODIUM PHOSPHATE, DIBASIC, ANHYDROUS 30 MMOL: 142; 276 INJECTION, SOLUTION INTRAVENOUS at 12:01

## 2024-01-30 RX ADMIN — SODIUM CHLORIDE 125 ML/HR: 9 INJECTION, SOLUTION INTRAVENOUS at 11:01

## 2024-01-30 RX ADMIN — CLINDAMYCIN PHOSPHATE 900 MG: 900 INJECTION, SOLUTION INTRAVENOUS at 02:01

## 2024-01-30 RX ADMIN — ACETAMINOPHEN 1000 MG: 500 TABLET ORAL at 06:01

## 2024-01-30 RX ADMIN — ACETAMINOPHEN 1000 MG: 10 INJECTION INTRAVENOUS at 01:01

## 2024-01-30 RX ADMIN — INSULIN HUMAN 0.1 UNITS/KG/HR: 1 INJECTION, SOLUTION INTRAVENOUS at 06:01

## 2024-01-30 RX ADMIN — INSULIN DETEMIR 10 UNITS: 100 INJECTION, SOLUTION SUBCUTANEOUS at 09:01

## 2024-01-30 RX ADMIN — SODIUM CHLORIDE, POTASSIUM CHLORIDE, SODIUM LACTATE AND CALCIUM CHLORIDE 1000 ML: 600; 310; 30; 20 INJECTION, SOLUTION INTRAVENOUS at 10:01

## 2024-01-30 RX ADMIN — PIPERACILLIN AND TAZOBACTAM 4.5 G: 4; .5 INJECTION, POWDER, LYOPHILIZED, FOR SOLUTION INTRAVENOUS; PARENTERAL at 11:01

## 2024-01-30 RX ADMIN — CEFEPIME 1 G: 1 INJECTION, POWDER, FOR SOLUTION INTRAMUSCULAR; INTRAVENOUS at 01:01

## 2024-01-30 NOTE — CARE UPDATE
General Surgery Post Operative Care Update     Patient taken to ICU s/p incision and drainage and debridement of fourniers; large amount of necrotic tissue in right supero-medial thigh, buttock. Will require take back to operating room for further debridement and dressing change     Also noted turbid urine and purulent drainage from vagina during meza placement. No apparent connection to gangrenous thigh/buttock tissue.       Plan  - Will plan for return to OR likely Wednesday morning   - recommend PCA, multimodals (IV tylenol)  - Continue broad spectrum abx, await cultures  - liberal resuscitation, she is likely to have sirs response following this debridement     We will follow closely.    Suzanne Woo MD  PGY-4, General Surgery  [facility]

## 2024-01-30 NOTE — ASSESSMENT & PLAN NOTE
Patient with acute kidney injury/acute renal failure likely due to pre-renal azotemia due to IVVD ALVARADO is currently stable. Baseline creatinine  unknown  - Labs reviewed- Renal function/electrolytes with Estimated Creatinine Clearance: 26.8 mL/min (A) (based on SCr of 3.8 mg/dL (H)). according to latest data. Monitor urine output and serial BMP and adjust therapy as needed. Avoid nephrotoxins and renally dose meds for GFR listed above.

## 2024-01-30 NOTE — PROGRESS NOTES
[place] - Intensive Care  General Surgery  Progress Note    Subjective:     History of Present Illness:  53 year old morbidly obese female who does not routinely go to the doctor presents to the ED with malaise, nausea, vomiting, and groin, buttock, perineal swelling and tenderness. She began feeling ill a few days ago.     On arrival to the ED she is tachycardic to 120, hypertensive without fever. Labs demonstrate leukocytosis of 21, , with lactic acidosis and associated ALVARADO with cr 3.8, hyperglycemia with blood glucose of 824 accompanied by severe electrolyte derangements. CT imaging obtained demonstrates diffuse subcutaneous air along buttocks, perineum, anterior vulva, as well as bilateral thighs.     No prior abdominal surgeries. She denies problems with her heart or lungs. Non smoker. Does not routinely take any medications.    Post-Op Info:  Procedure(s) (LRB):  INCISION AND DRAINAGE, ABSCESS (N/A)   1 Day Post-Op     Interval History: NAEO. Remains somnolent. Low grade tachycardia, normotensive and afebrile. Dressings to right medial thigh debridement site CDI. UOP low postop with only 125 mL overnight. Lab work with Cr elevated at 3.6, glucose better controlled on insulin gtt, down to 160 this morning.    Medications:  Continuous Infusions:   sodium chloride 0.9% 125 mL/hr (01/30/24 0800)    dextrose 5 % and 0.45 % NaCl      hydromorphone in 0.9 % NaCl 6 mg/30 ml      insulin regular 1 units/mL infusion orderable (DKA) 0.05 Units/kg/hr (01/30/24 0858)     Scheduled Meds:   acetaminophen  1,000 mg Oral Q8H    clindamycin  900 mg Intramuscular Q8H    famotidine (PF)  20 mg Intravenous Daily    lactated ringers  1,000 mL Intravenous Once    mupirocin   Nasal BID    piperacillin-tazobactam (Zosyn) IV (PEDS and ADULTS) (extended infusion is not appropriate)  4.5 g Intravenous Q8H    sodium phosphate 30 mmol in dextrose 5 % (D5W) 250 mL IVPB  30 mmol Intravenous Once     PRN Meds:acetaminophen,  albuterol-ipratropium, dextrose 10 % in water (D10W), dextrose 10 % in water (D10W), dextrose 10%, dextrose 10%, dextrose 5 % and 0.45 % NaCl, melatonin, naloxone, ondansetron, potassium chloride **AND** potassium chloride **AND** potassium chloride, prochlorperazine, sodium chloride 0.9%, Pharmacy to dose Vancomycin consult **AND** vancomycin - pharmacy to dose     Review of patient's allergies indicates:  No Known Allergies  Objective:     Vital Signs (Most Recent):  Temp: 98.6 °F (37 °C) (01/30/24 0701)  Pulse: 105 (01/30/24 0830)  Resp: (!) 27 (01/30/24 0830)  BP: (!) 140/88 (01/30/24 0830)  SpO2: 95 % (01/30/24 0830) Vital Signs (24h Range):  Temp:  [98.1 °F (36.7 °C)-100.2 °F (37.9 °C)] 98.6 °F (37 °C)  Pulse:  [103-121] 105  Resp:  [20-39] 27  SpO2:  [90 %-100 %] 95 %  BP: (108-177)/(62-89) 140/88     Weight: (!) 153.1 kg (337 lb 8.4 oz)  Body mass index is 54.48 kg/m².    Intake/Output - Last 3 Shifts         01/28 0700  01/29 0659 01/29 0700 01/30 0659 01/30 0700 01/31 0659    I.V. (mL/kg)  838.8 (5.5) 199.6 (1.3)    IV Piggyback  3874 22.4    Total Intake(mL/kg)  4712.8 (30.8) 221.9 (1.4)    Urine (mL/kg/hr)  360 5 (0)    Blood  200     Total Output  560 5    Net  +4152.8 +216.9                    Physical Exam  Vitals reviewed.   Constitutional:       General: She is not in acute distress.     Appearance: She is obese.   HENT:      Head: Normocephalic.   Cardiovascular:      Rate and Rhythm: Regular rhythm. Tachycardia present.      Pulses: Normal pulses.   Pulmonary:      Effort: Pulmonary effort is normal.      Comments: 2 L NC  Abdominal:      Palpations: Abdomen is soft.      Tenderness: There is no abdominal tenderness.   Musculoskeletal:      Comments: Dressings C/D/I. Surrounding skin soft with decreased erythema   Skin:     General: Skin is warm and dry.   Neurological:      General: No focal deficit present.      Comments: somnolent          Significant Labs:  I have reviewed all pertinent lab  results within the past 24 hours.  CBC:   Recent Labs   Lab 01/30/24  0447   WBC 14.73*   RBC 4.54   HGB 12.0   HCT 37.9      MCV 84   MCH 26.4*   MCHC 31.7*     CMP:   Recent Labs   Lab 01/29/24  1929 01/30/24  0042 01/30/24  0447   *   < > 412*   CALCIUM 8.9   < > 8.3*   ALBUMIN 2.6*  --   --    PROT 8.0  --   --    *   < > 134*   K 4.2   < > 3.8   CO2 17*   < > 17*   CL 94*   < > 103   BUN 44*   < > 46*   CREATININE 3.8*   < > 3.6*   ALKPHOS 151*  --   --    ALT 30  --   --    AST 40  --   --    BILITOT 0.7  --   --     < > = values in this interval not displayed.       Significant Diagnostics:  I have reviewed all pertinent imaging results/findings within the past 24 hours.  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 Now s/p debridement on 1/30.    - return to OR 1/31 for potential further debridement  - NPO at midnight  - ALVARADO with low UOP postop   - Fluid challenge this morning with 1 L bolus and continued mIVF   - Urine Na, Cr, and osmolality ordered   - Trend renal labs  - Pain control with PCA  - Insulin gtt, management per primary  - Continue broad spectrum IV antibiotics with clindamycin  - Will likely have extensive wound care needs   - Continue ICU care        Salvador Rojo MD  General Surgery  [facility] [place] [facility]

## 2024-01-30 NOTE — H&P
[facility] Medicine  History & Physical    Patient Name: Sarah Saravia  MRN: [id number]  Patient Class: IP- Inpatient  Admission Date: 2024  Attending Physician: Jovan Gonzales MD   Primary Care Provider: [facility]         Patient information was obtained from patient and ER records.     Subjective:     Principal Problem:Ayaz's gangrene    Chief Complaint:   Chief Complaint   Patient presents with    Vomiting     Vomiting and diarrhea for 3-4 days.   Wound to posterior right thigh.         HPI: This is a 53-year-old female with a past medical history of morbid obesity who presents with vomiting.     Patient presents with nausea, vomiting that started 4 days prior to presentation.  She also reports a wound in her right groin area without associated drainage.    In the ED, the patient was tachycardic (up to 120s) and hypertensive.  Labs were remarkable for leukocytosis (21.6), elevated creatinine (3.8-unknown baseline), elevated lactic acid (5.6), hyponatremia (130), elevated CRP (530.2) and were suggestive of DKA (B, A, CO2: 17, BHB: 3.2, PH: 7.293).  CT abdomen and pelvis showed extensive soft tissue air throughout the right groin and inguinal region and extending to involve the right lower quadrant anterior abdominal wall with extensive soft tissue air also seen involving the proximal medial aspect of the right thigh, concerning for gas-forming infection.  General surgery was consulted and the patient was taken to the OR for debridement.  Patient was given 2.7 L of NS, vancomycin, Zosyn, clindamycin, morphine 4 mg IV, Zofran 4 mg IV and was started on an insulin drip.  She was admitted for further management.    No past medical history on file.    No past surgical history on file.    Review of patient's allergies indicates:  No Known Allergies    No current facility-administered medications on file prior to encounter.     No current  outpatient medications on file prior to encounter.     Family History    None       Tobacco Use    Smoking status: Not on file    Smokeless tobacco: Not on file   Substance and Sexual Activity    Alcohol use: Not on file    Drug use: Not on file    Sexual activity: Not on file     Review of Systems   Unable to perform ROS: Mental status change   (Post surgery, likely related to sedation)  Objective:     Vital Signs (Most Recent):  Temp: 98.1 °F (36.7 °C) (01/30/24 0008)  Pulse: 109 (01/30/24 0008)  Resp: 20 (01/30/24 0008)  BP: (!) 153/78 (01/30/24 0008)  SpO2: 100 % (FM) (01/30/24 0008) Vital Signs (24h Range):  Temp:  [98.1 °F (36.7 °C)-98.3 °F (36.8 °C)] 98.1 °F (36.7 °C)  Pulse:  [109-120] 109  Resp:  [20] 20  SpO2:  [90 %-100 %] 100 %  BP: (122-177)/(73-81) 153/78     Weight: (!) 158.8 kg (350 lb)  Body mass index is 56.49 kg/m².     Physical Exam  Vitals and nursing note reviewed.   Constitutional:       General: She is not in acute distress.     Appearance: Normal appearance. She is obese. She is not ill-appearing.   HENT:      Head: Normocephalic and atraumatic.      Nose: Nose normal.      Mouth/Throat:      Mouth: Mucous membranes are moist.   Eyes:      Extraocular Movements: Extraocular movements intact.   Cardiovascular:      Rate and Rhythm: Tachycardia present.      Pulses: Normal pulses.      Heart sounds: No murmur heard.  Pulmonary:      Effort: Pulmonary effort is normal. No respiratory distress.   Abdominal:      General: Abdomen is flat.      Palpations: Abdomen is soft.      Tenderness: There is no abdominal tenderness.   Musculoskeletal:      Right lower leg: No edema.      Left lower leg: No edema.      Comments: Right groin wound, packed    Skin:     General: Skin is warm.      Capillary Refill: Capillary refill takes less than 2 seconds.   Neurological:      Mental Status: She is alert.      Comments: A&Ox2 (post anesthesia)                 Significant Labs: All pertinent labs within the  past 24 hours have been reviewed.    Significant Imaging: I have reviewed all pertinent imaging results/findings within the past 24 hours.  Assessment/Plan:     * Ayaz's gangrene  CT abdomen and pelvis showed extensive soft tissue air throughout the right groin and inguinal region and extending to involve the right lower quadrant anterior abdominal wall with extensive soft tissue air also seen involving the proximal medial aspect of the right thigh, concerning for gas-forming infection.    S/p OR for debridement on 1/29    Plan:   Continue vancomycin, Zosyn, clindamycin  Follow-up cultures    Hyponatremia  Patient has hyponatremia which is uncontrolled,We will aim to correct the sodium by 4-6mEq in 24 hours. We will monitor sodium Every 4 hours. The hyponatremia is due to Dehydration/hypovolemia. We will obtain the following studies: Urine sodium, urine osmolality, serum osmolality. We will treat the hyponatremia with IV fluids as follows: per DKA protocol. The patient's sodium results have been reviewed and are listed below.  Recent Labs   Lab 01/29/24  1929   *       ALVARADO (acute kidney injury)  Patient with acute kidney injury/acute renal failure likely due to pre-renal azotemia due to IVVD ALVARADO is currently stable. Baseline creatinine  unknown  - Labs reviewed- Renal function/electrolytes with Estimated Creatinine Clearance: 26.8 mL/min (A) (based on SCr of 3.8 mg/dL (H)). according to latest data. Monitor urine output and serial BMP and adjust therapy as needed. Avoid nephrotoxins and renally dose meds for GFR listed above.    Severe sepsis  This patient does have evidence of infective focus  My overall impression is sepsis.  Source: Skin and Soft Tissue (location groin)  Antibiotics given-   Antibiotics (72h ago, onward)      Start     Stop Route Frequency Ordered    01/30/24 0530  clindamycin injection 900 mg         -- IM Every 8 hours (non-standard times) 01/30/24 0017    01/30/24 0330   piperacillin-tazobactam (ZOSYN) 4.5 g in dextrose 5 % in water (D5W) 100 mL IVPB (MB+)         -- IV Every 8 hours (non-standard times) 01/30/24 0017    01/29/24 2000  vancomycin (VANCOCIN) 2,500 mg in dextrose 5 % (D5W) 500 mL IVPB         -- IV Once 01/29/24 1931 01/29/24 1944  vancomycin - pharmacy to dose  (vancomycin IVPB (PEDS and ADULTS))        See Hyperspace for full Linked Orders Report.    -- IV pharmacy to manage frequency 01/29/24 1846          Latest lactate reviewed-  Recent Labs   Lab 01/29/24  1950   POCLAC 5.64*     Organ dysfunction indicated by Acute kidney injury    Fluid challenge Ideal Body Weight- The patient's ideal body weight is Ideal body weight: 59.3 kg (130 lb 11.7 oz) which will be used to calculate fluid bolus of 30 ml/kg for treatment of septic shock.      Post- resuscitation assessment Yes Perfusion exam was performed within 6 hours of septic shock presentation after bolus shows Adequate tissue perfusion assessed by non-invasive monitoring       Will Not start Pressors- Levophed for MAP of 65  Source control achieved by: Abx    Morbid obesity  Body mass index is 56.49 kg/m². Morbid obesity complicates all aspects of disease management from diagnostic modalities to treatment. Weight loss encouraged and health benefits explained to patient.         Diabetic acidosis without coma  DKA protocol. IV insulin, IV fluids  Monitor BMPs      VTE Risk Mitigation (From admission, onward)           Ordered     Place sequential compression device  Until discontinued         01/30/24 0016     IP VTE HIGH RISK PATIENT  Once         01/30/24 0016     Place NATE hose  Until discontinued         01/30/24 0016     Place sequential compression device  Until discontinued         01/30/24 0016     Place sequential compression device  Until discontinued         01/29/24 2052                  Critical care time spent on the evaluation and treatment of severe organ dysfunction, review of pertinent labs and  imaging studies, discussions with consulting providers and discussions with patient/family: 60 minutes.             AdmissionCare    Guideline: Diabetes - INPT, Inpatient    Based on the indications selected for the patient, the bed status of Admit to Inpatient was determined to be MET    The following indications were selected as present at the time of evaluation of the patient:       - Hyperglycemia (eg, plasma glucose greater than 200 mg/dL (11.10 mmol/L))    - Acidosis (eg, arterial or venous pH less than 7.30, or serum bicarbonate less than 15 mEq/L (mmol/L))    Inpatient management appropriate, as indicated by 1 or more of the following:    -      - Inpatient treatment of underlying etiology needed (eg, infection)    - Plasma glucose greater than 600 mg/dL (33.30 mmol/L)   Hyperglycemia requiring inpatient care, as indicated by 1 or more of the following:   -     - Inpatient treatment of underlying etiology of hyperglycemia needed (eg, infection)    AdmissionCare documentation entered by: JAMIE Dorsey    [facility], 27th edition, Copyright © 2023 [facility] [employer], [facility] All Rights Reserved.  [id number]:18:[id number]:00    Hector Sam MD  Department of Hospital Medicine  [facility] - Intensive Care

## 2024-01-30 NOTE — TRANSFER OF CARE
"Anesthesia Transfer of Care Note    Patient: Sarah Saravia    Procedure(s) Performed: Procedure(s) (LRB):  INCISION AND DRAINAGE, ABSCESS (N/A)    Patient location: ICU    Anesthesia Type: general    Transport from OR: Transported from OR on 100% O2 by closed face mask with adequate spontaneous ventilation. Continuous ECG monitoring in transport. Continuous SpO2 monitoring in transport. Continuos invasive BP monitoring in transport    Post pain: adequate analgesia    Post assessment: no apparent anesthetic complications and tolerated procedure well    Post vital signs: stable    Level of consciousness: sedated and responds to stimulation    Nausea/Vomiting: no nausea/vomiting    Complications: none    Transfer of care protocol was followed      Last vitals: Visit Vitals  BP (!) 153/78 (BP Location: Right forearm)   Pulse 109   Temp 36.7 °C (98.1 °F) (Oral)   Resp 20   Ht 5' 6" (1.676 m)   Wt (!) 158.8 kg (350 lb)   LMP 01/01/2024 (Approximate)   SpO2 100%   BMI 56.49 kg/m²     "

## 2024-01-30 NOTE — PROGRESS NOTES
"Pharmacokinetic Initial Assessment: IV Vancomycin    Assessment/Plan:    Initiate intravenous vancomycin with loading dose of 2500 mg once with subsequent doses when random concentrations are less than 20 mcg/mL  Desired empiric serum trough concentration is 10 to 20 mcg/mL  Draw vancomycin random level on 1/30 at 1300.  Pharmacy will continue to follow and monitor vancomycin.      Please contact pharmacy at extension [phone] with any questions regarding this assessment.     Thank you for the consult,   Kendell Davies       Patient brief summary:  Sarah Saravia is a 53 y.o. female initiated on antimicrobial therapy with IV Vancomycin for treatment of suspected skin & soft tissue infection    Drug Allergies:   Review of patient's allergies indicates:  No Known Allergies    Actual Body Weight:   158.8 kg    Renal Function:   Estimated Creatinine Clearance: 29.1 mL/min (A) (based on SCr of 3.5 mg/dL (H)).,     Dialysis Method (if applicable):  N/A    CBC (last 72 hours):  Recent Labs   Lab Result Units 01/29/24 1929 01/30/24  0042   WBC K/uL 21.60* 17.67*   Hemoglobin g/dL 12.0 13.0   Hematocrit % 38.5 42.1   Platelets K/uL 217 216   Gran % % 87.8* 89.2*   Lymph % % 5.6* 7.1*   Mono % % 4.3 2.8*   Eosinophil % % 0.5 0.1   Basophil % % 0.6 0.2   Differential Method  Automated Automated       Metabolic Panel (last 72 hours):  Recent Labs   Lab Result Units 01/29/24 1929 01/30/24  0042   Sodium mmol/L 130* 134*   Potassium mmol/L 4.2 3.9   Chloride mmol/L 94* 103   CO2 mmol/L 17* 15*   Glucose mg/dL 824* 571*   BUN mg/dL 44* 44*   Creatinine mg/dL 3.8* 3.5*   Albumin g/dL 2.6*  --    Total Bilirubin mg/dL 0.7  --    Alkaline Phosphatase U/L 151*  --    AST U/L 40  --    ALT U/L 30  --    Phosphorus mg/dL  --  2.4*       Drug levels (last 3 results):  No results for input(s): "VANCOMYCINRA", "VANCORANDOM", "VANCOMYCINPE", "VANCOPEAK", "VANCOMYCINTR", "VANCOTROUGH" in the last 72 hours.    Microbiologic " Results:  Microbiology Results (last 7 days)       Procedure Component Value Units Date/Time    Blood culture x two cultures. Draw prior to antibiotics. [[id number]] Collected: 01/29/24 1929    Order Status: Completed Specimen: Blood from Peripheral, Antecubital, Right Updated: 01/30/24 0312     Blood Culture, Routine No Growth to date    Narrative:      Aerobic and anaerobic    AFB Culture & Smear [[id number]] Collected: 01/30/24 0219    Order Status: Sent Specimen: Abscess from Groin Updated: 01/30/24 0247    Fungus culture [[id number]] Collected: 01/30/24 0219    Order Status: Sent Specimen: Abscess from Groin Updated: 01/30/24 0247    Gram stain [[id number]] Collected: 01/30/24 0219    Order Status: Sent Specimen: Abscess from Groin Updated: 01/30/24 0246    Culture, Anaerobe [[id number]] Collected: 01/30/24 0219    Order Status: Sent Specimen: Abscess from Groin Updated: 01/30/24 0245    Aerobic culture [[id number]] Collected: 01/30/24 0219    Order Status: Sent Specimen: Abscess from Groin Updated: 01/30/24 0244    Fungus culture [[id number]] Collected: 01/30/24 0228    Order Status: Sent Specimen: Wound from Groin Updated: 01/30/24 0243    AFB Culture & Smear [[id number]] Collected: 01/30/24 0228    Order Status: Sent Specimen: Wound from Groin Updated: 01/30/24 0242    Gram stain [[id number]] Collected: 01/30/24 0228    Order Status: Sent Specimen: Wound from Groin Updated: 01/30/24 0241    Culture, Anaerobe [[id number]] Collected: 01/30/24 0228    Order Status: Sent Specimen: Wound from Groin Updated: 01/30/24 0240    Aerobic culture [[id number]] Collected: 01/30/24 0228    Order Status: Sent Specimen: Wound from Groin Updated: 01/30/24 0239    AFB Culture & Smear [[id number]] Collected: 01/30/24 0058    Order Status: Sent Specimen: Abscess from Groin Updated: 01/30/24 0058    Fungus culture [[id number]] Collected: 01/29/24 1115    Order Status: Sent Specimen: Abscess from Groin Updated: 01/30/24 0019     Gram stain [[id number]] Collected: 01/29/24 1115    Order Status: Sent Specimen: Abscess from Groin Updated: 01/30/24 0019    Culture, Anaerobe [[id number]] Collected: 01/29/24 1115    Order Status: Sent Specimen: Abscess from Groin Updated: 01/30/24 0019    Aerobic culture [[id number]] Collected: 01/29/24 1115    Order Status: Sent Specimen: Abscess from Groin Updated: 01/30/24 0019    AFB Culture & Smear [[id number]] Collected: 01/29/24 1115    Order Status: Sent Specimen: Abscess from Groin Updated: 01/30/24 0019    Culture, Anaerobe [[id number]] Collected: 01/29/24 1115    Order Status: Sent Specimen: Abscess from Groin Updated: 01/30/24 0019    Aerobic culture [[id number]] Collected: 01/29/24 1115    Order Status: Sent Specimen: Abscess from Groin Updated: 01/30/24 0019    AFB Culture & Smear [[id number]] Collected: 01/29/24 1115    Order Status: Sent Specimen: Abscess from Groin Updated: 01/30/24 0019    Gram stain [[id number]] Collected: 01/29/24 1115    Order Status: Sent Specimen: Abscess from Groin Updated: 01/30/24 0019    Fungus culture [[id number]] Collected: 01/29/24 1115    Order Status: Sent Specimen: Abscess from Groin Updated: 01/30/24 0019    Blood culture x two cultures. Draw prior to antibiotics. [[id number]] Collected: 01/29/24 2118    Order Status: Sent Specimen: Blood from Peripheral, Antecubital, Left Updated: 01/29/24 2122

## 2024-01-30 NOTE — EICU
Brief Eicu admission review note.     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   Luigi,52 y/o female with  supermorbid obesity BMI 56, no other medial problems presented with c/o n/v, malaise and  right upper thigh/groin wound with pain and swelling around it. In ER tachycardic at 120, afebrile. Exam with  induration and swelling to right upper thigh/groin area with crepitus, induration and crepitus extends to upper medial and posterior thigh. No active drainage. Labs with leukocytosis 21K, BMP significant for , creatinine  3.8, bicarb 17,  BHB 3.2, LA 4.9, . CT abdomen :Extensive soft tissue air throughout the right groin and inguinal region and extending to involve the right lower quadrant anterior abdominal wall with extensive soft tissue air also seen involving the proximal medial aspect of the right thigh. This is most concerning for gas-forming infection. No organized focal fluid collection or abscess seen.     Pt was taken to OR, I&D performed. OR findings: necrotizing soft tissue infection with necrotic tissue requiring debridement down to the level of the fascia and muscle of the right supero-medial and medial inferior buttock. Pt started on Zosyn, Vanc, Clinda, given sepsis 30 cc/ibw/kg  bolus, started on Insulin qtt and IVF per DKA protocol. Extubated, doing well postop.          Evaluation via interactive audio and video telecomminications:  Sleepy but responsive, , 114/65, HH  20-24, sats 97% on 2l n/c    Problems:  Nectotizing soft tissue infection of the groin/buttocks s/p I&D  Sepsis from the above soft tissue infection  ALVARADO  DKA, newly diagnosed DM  AGMA from DKA , Lactic acidosis  Supermorbid obesity        Plan:  Continue Zosyn, Clinda, Vanc, follow up cxs  Wound care per surgery  Insulin qtt, IVF per DKA protocol  GIUSEPPE Glynn pending  Pain controll- dilaudid PCA      SUP:  on Pepcid    VTE  prevention: heparin starting tomorrow if no bleeding issues at the  surgical sight    D/w RN

## 2024-01-30 NOTE — CONSULTS
Nutrition-Related Diabetes Education      Learners: Sarah Saravia    Current HbA1c:   Hemoglobin A1C   Date Value Ref Range Status   01/30/2024 10.4 (H) 4.0 - 5.6 % Final     Comment:     ADA Screening Guidelines:  5.7-6.4%  Consistent with prediabetes  >or=6.5%  Consistent with diabetes    High levels of fetal hemoglobin interfere with the HbA1C  assay. Heterozygous hemoglobin variants (HbS, HgC, etc)do  not significantly interfere with this assay.   However, presence of multiple variants may affect accuracy.        Nutrition Education with handouts: Diabetes and diet    Comments: RD consult for education on diabetic diet. Pt with other care at time of visit. Educations attached to pt chart for re-attempt.    Please consult as needed.  Thank you!     Negra Hartmann, Registration Eligible, Provisional LDN

## 2024-01-30 NOTE — PROGRESS NOTES
Pharmacokinetic Assessment Follow Up: IV Vancomycin    Vancomycin serum concentration assessment(s):    The random level was drawn correctly and can be used to guide therapy at this time. The measurement is above the desired definitive target range of 10 to 20 mcg/mL.    Vancomycin Regimen Plan:    Re-dose when the random level is less than 20 mcg/mL, next level to be drawn at 0300 on 1/31/24    Drug levels (last 3 results):  Recent Labs   Lab Result Units 01/30/24  1355   Vancomycin, Random ug/mL 30.5       Pharmacy will continue to follow and monitor vancomycin.    Please contact pharmacy at extension [phone] for questions regarding this assessment.    Thank you for the consult,   Sobia Davies       Patient brief summary:  Sarah Saravia is a 53 y.o. female initiated on antimicrobial therapy with IV Vancomycin for treatment of skin & soft tissue infection        Drug Allergies:   Review of patient's allergies indicates:  No Known Allergies    Actual Body Weight:   153.1 kg     Renal Function:   Estimated Creatinine Clearance: 25.5 mL/min (A) (based on SCr of 3.9 mg/dL (H)).,     Dialysis Method (if applicable):  N/A    CBC (last 72 hours):  Recent Labs   Lab Result Units 01/29/24 1929 01/30/24 0042 01/30/24  0447   WBC K/uL 21.60* 17.67* 14.73*   Hemoglobin g/dL 12.0 13.0 12.0   Hemoglobin A1C %  --  10.4*  --    Hematocrit % 38.5 42.1 37.9   Platelets K/uL 217 216 186   Gran % % 87.8* 89.2* 86.3*   Lymph % % 5.6* 7.1* 9.0*   Mono % % 4.3 2.8* 3.5*   Eosinophil % % 0.5 0.1 0.1   Basophil % % 0.6 0.2 0.3   Differential Method  Automated Automated Automated       Metabolic Panel (last 72 hours):  Recent Labs   Lab Result Units 01/29/24 1929 01/30/24 0042 01/30/24  0415 01/30/24  0447 01/30/24  0834 01/30/24  0958 01/30/24  1355   Sodium mmol/L 130* 134*  --  134* 136  --  134*   Sodium, Urine mmol/L  --   --   --   --   --  <20*  --    Potassium mmol/L 4.2 3.9  --  3.8 4.1  --  3.9   Chloride mmol/L 94* 103   --  103 109  --  105   CO2 mmol/L 17* 15*  --  17* 18*  --  17*   Glucose mg/dL 824* 571*  --  412* 167*  --  198*   Glucose, UA   --   --  4+*  --   --   --   --    BUN mg/dL 44* 44*  --  46* 47*  --  47*   Creatinine mg/dL 3.8* 3.5*  --  3.6* 3.8*  --  3.9*   Creatinine, Urine mg/dL  --   --   --   --   --  274.8  --    Albumin g/dL 2.6*  --   --   --   --   --   --    Total Bilirubin mg/dL 0.7  --   --   --   --   --   --    Alkaline Phosphatase U/L 151*  --   --   --   --   --   --    AST U/L 40  --   --   --   --   --   --    ALT U/L 30  --   --   --   --   --   --    Phosphorus mg/dL  --  2.4*  --  1.9*  --   --   --        Vancomycin Administrations:  vancomycin given in the last 96 hours                     vancomycin (VANCOCIN) 2,500 mg in dextrose 5 % (D5W) 500 mL IVPB ()  Restarted 01/30/24 0328     2,500 mg New Bag  0056                    Microbiologic Results:  Microbiology Results (last 7 days)       Procedure Component Value Units Date/Time    AFB Culture & Smear [[id number]] Collected: 01/30/24 0219    Order Status: Sent Specimen: Abscess from Groin Updated: 01/30/24 1542    AFB Culture & Smear [[id number]] Collected: 01/30/24 0228    Order Status: Sent Specimen: Wound from Groin Updated: 01/30/24 1542    Gram stain [[id number]] Collected: 01/30/24 0228    Order Status: Completed Specimen: Wound from Groin Updated: 01/30/24 1035     Gram Stain Result Moderate Gram positive cocci in pairs and chains      No WBC's    Narrative:      Right groin tissue    Gram stain [[id number]] Collected: 01/30/24 0219    Order Status: Completed Specimen: Abscess from Groin Updated: 01/30/24 1035     Gram Stain Result Moderate Gram positive cocci in pairs and chains      No WBC's    Narrative:      Right groin abcess    Blood culture x two cultures. Draw prior to antibiotics. [[id number]] Collected: 01/29/24 2118    Order Status: Completed Specimen: Blood from Peripheral, Antecubital, Left Updated: 01/30/24 0512      Blood Culture, Routine No Growth to date    Narrative:      Aerobic and anaerobic    Blood culture x two cultures. Draw prior to antibiotics. [[id number]] Collected: 01/29/24 1929    Order Status: Completed Specimen: Blood from Peripheral, Antecubital, Right Updated: 01/30/24 0312     Blood Culture, Routine No Growth to date    Narrative:      Aerobic and anaerobic    Fungus culture [[id number]] Collected: 01/30/24 0219    Order Status: Sent Specimen: Abscess from Groin Updated: 01/30/24 0247    Culture, Anaerobe [[id number]] Collected: 01/30/24 0219    Order Status: Sent Specimen: Abscess from Groin Updated: 01/30/24 0245    Aerobic culture [[id number]] Collected: 01/30/24 0219    Order Status: Sent Specimen: Abscess from Groin Updated: 01/30/24 0244    Fungus culture [[id number]] Collected: 01/30/24 0228    Order Status: Sent Specimen: Wound from Groin Updated: 01/30/24 0243    Culture, Anaerobe [[id number]] Collected: 01/30/24 0228    Order Status: Sent Specimen: Wound from Groin Updated: 01/30/24 0240    Aerobic culture [[id number]] Collected: 01/30/24 0228    Order Status: Sent Specimen: Wound from Groin Updated: 01/30/24 0239    AFB Culture & Smear [[id number]] Collected: 01/30/24 0058    Order Status: Sent Specimen: Abscess from Groin Updated: 01/30/24 0058    Fungus culture [[id number]] Collected: 01/29/24 1115    Order Status: Sent Specimen: Abscess from Groin Updated: 01/30/24 0019    Gram stain [[id number]] Collected: 01/29/24 1115    Order Status: Sent Specimen: Abscess from Groin Updated: 01/30/24 0019    Culture, Anaerobe [[id number]] Collected: 01/29/24 1115    Order Status: Sent Specimen: Abscess from Groin Updated: 01/30/24 0019    Aerobic culture [[id number]] Collected: 01/29/24 1115    Order Status: Sent Specimen: Abscess from Groin Updated: 01/30/24 0019    AFB Culture & Smear [[id number]] Collected: 01/29/24 1115    Order Status: Sent Specimen: Abscess from Groin Updated: 01/30/24 0019     Culture, Anaerobe [[id number]] Collected: 01/29/24 1115    Order Status: Sent Specimen: Abscess from Groin Updated: 01/30/24 0019    Aerobic culture [[id number]] Collected: 01/29/24 1115    Order Status: Sent Specimen: Abscess from Groin Updated: 01/30/24 0019    AFB Culture & Smear [[id number]] Collected: 01/29/24 1115    Order Status: Sent Specimen: Abscess from Groin Updated: 01/30/24 0019    Gram stain [[id number]] Collected: 01/29/24 1115    Order Status: Sent Specimen: Abscess from Groin Updated: 01/30/24 0019    Fungus culture [[id number]] Collected: 01/29/24 1115    Order Status: Sent Specimen: Abscess from Groin Updated: 01/30/24 0019

## 2024-01-30 NOTE — HPI
53 year old morbidly obese female who does not routinely go to the doctor presents to the ED with malaise, nausea, vomiting, and groin, buttock, perineal swelling and tenderness. She began feeling ill a few days ago.     On arrival to the ED she is tachycardic to 120, hypertensive without fever. Labs demonstrate leukocytosis of 21, , with lactic acidosis and associated ALVARADO with cr 3.8, hyperglycemia with blood glucose of 824 accompanied by severe electrolyte derangements. CT imaging obtained demonstrates diffuse subcutaneous air along buttocks, perineum, anterior vulva, as well as bilateral thighs.     No prior abdominal surgeries. She denies problems with her heart or lungs. Non smoker. Does not routinely take any medications.

## 2024-01-30 NOTE — BRIEF OP NOTE
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     Estimated Blood Loss: 200 mL    Estimated Blood Loss has been documented.         Specimens:   Specimen (24h ago, onward)      None            [id number]

## 2024-01-30 NOTE — NURSING
[facility] [place]  Nurses Note -- 4 Eyes      1/30/2024       Skin assessed on: Transfer      [x] No Pressure Injuries Present    [x]Prevention Measures Documented    Moisture related breakdown around buttock    [] Yes LDA  for Pressure Injury Previously documented     [] Yes New Pressure Injury Discovered   [] LDA for New Pressure Injury Added      Attending RN:  Suma Moran RN     Second RN:  ELISABETH Chambers

## 2024-01-30 NOTE — ADMISSIONCARE
AdmissionCare    Guideline: Diabetes - INPT, Inpatient    Based on the indications selected for the patient, the bed status of Admit to Inpatient was determined to be MET    The following indications were selected as present at the time of evaluation of the patient:       - Hyperglycemia (eg, plasma glucose greater than 200 mg/dL (11.10 mmol/L))    - Acidosis (eg, arterial or venous pH less than 7.30, or serum bicarbonate less than 15 mEq/L (mmol/L))    Inpatient management appropriate, as indicated by 1 or more of the following:    -      - Inpatient treatment of underlying etiology needed (eg, infection)    - Plasma glucose greater than 600 mg/dL (33.30 mmol/L)   Hyperglycemia requiring inpatient care, as indicated by 1 or more of the following:   -     - Inpatient treatment of underlying etiology of hyperglycemia needed (eg, infection)    AdmissionCare documentation entered by: JAMIE Dorsey    [facility], 27th edition, Copyright © 2023 [facility] [employer], [facility] All Rights Reserved.  [id number]:18:14-06:00

## 2024-01-30 NOTE — ASSESSMENT & PLAN NOTE
CT abdomen and pelvis showed extensive soft tissue air throughout the right groin and inguinal region and extending to involve the right lower quadrant anterior abdominal wall with extensive soft tissue air also seen involving the proximal medial aspect of the right thigh, concerning for gas-forming infection.    S/p OR for debridement on 1/29    Plan:   Continue vancomycin, Zosyn, clindamycin  Follow-up cultures

## 2024-01-30 NOTE — ANESTHESIA PREPROCEDURE EVALUATION
01/29/2024  Sarah Saravia is a 53 y.o., female.  To undergo Procedure(s) (LRB):  INCISION AND DRAINAGE, ABSCESS (N/A)     Denies CP/SOB/GERD/MI/CVA/URI symptoms.  METS > 4  NPO < 8    Past Medical History:  No past medical history on file.    Past Surgical History:  No past surgical history on file.    Social History:  Social History     Socioeconomic History    Marital status: Single       Medications:  No current facility-administered medications on file prior to encounter.     No current outpatient medications on file prior to encounter.       Allergies:  Review of patient's allergies indicates:  No Known Allergies    Active Problems:  There is no problem list on file for this patient.      Diagnostic Studies:   Latest Reference Range & Units 01/29/24 19:29   WBC 3.90 - 12.70 K/uL 21.60 (H)   RBC 4.00 - 5.40 M/uL 4.59   Hemoglobin 12.0 - 16.0 g/dL 12.0   Hematocrit 37.0 - 48.5 % 38.5   MCV 82 - 98 fL 84   MCH 27.0 - 31.0 pg 26.1 (L)   MCHC 32.0 - 36.0 g/dL 31.2 (L)   RDW 11.5 - 14.5 % 14.9 (H)   Platelet Count 150 - 450 K/uL 217   MPV 9.2 - 12.9 fL 11.5   Gran % 38.0 - 73.0 % 87.8 (H)   Lymph % 18.0 - 48.0 % 5.6 (L)   Mono % 4.0 - 15.0 % 4.3   Eosinophil % 0.0 - 8.0 % 0.5   Basophil % 0.0 - 1.9 % 0.6   Immature Granulocytes 0.0 - 0.5 % 1.2 (H)   Gran # (ANC) 1.8 - 7.7 K/uL 19.0 (H)   Lymph # 1.0 - 4.8 K/uL 1.2   Mono # 0.3 - 1.0 K/uL 0.9   Eos # 0.0 - 0.5 K/uL 0.1   Baso # 0.00 - 0.20 K/uL 0.13   Immature Grans (Abs) 0.00 - 0.04 K/uL 0.25 (H)   nRBC 0 /100 WBC 0   Differential Method  Automated      Latest Reference Range & Units 01/29/24 19:29   Sodium 136 - 145 mmol/L 130 (L)   Potassium 3.5 - 5.1 mmol/L 4.2   Chloride 95 - 110 mmol/L 94 (L)   CO2 23 - 29 mmol/L 17 (L)   Anion Gap 8 - 16 mmol/L 19 (H)   BUN 6 - 20 mg/dL 44 (H)   Creatinine 0.5 - 1.4 mg/dL 3.8 (H)   eGFR >60 mL/min/1.73 m^2 14 !   Glucose 70 - 110 mg/dL 824 (HH)   Calcium 8.7 - 10.5 mg/dL 8.9   ALP 55 - 135 U/L 151 (H)   PROTEIN TOTAL 6.0 -  8.4 g/dL 8.0   Albumin 3.5 - 5.2 g/dL 2.6 (L)   BILIRUBIN TOTAL 0.1 - 1.0 mg/dL 0.7   AST 10 - 40 U/L 40   ALT 10 - 44 U/L 30   CRP 0.0 - 8.2 mg/L 530.2 (H)     EKG (1/29/24):  ST    24 Hour Vitals:  Temp:  [36.8 °C (98.3 °F)] 36.8 °C (98.3 °F)  Pulse:  [111-120] 111  Resp:  [20] 20  SpO2:  [90 %-96 %] 95 %  BP: (122-177)/(73-81) 170/74   See Nursing Charting For Additional Vitals      Pre-op Assessment    I have reviewed the Patient Summary Reports.     I have reviewed the Nursing Notes.       Review of Systems  Anesthesia Hx:  No previous Anesthesia                Social:  Non-Smoker, No Alcohol Use       Hematology/Oncology:                   Hematology Comments: Septic shock                    Cardiovascular:  Cardiovascular Normal Exercise tolerance: good                 ECG has been reviewed.                          Pulmonary:  Pulmonary Normal                       Renal/:  Chronic Renal Disease                Hepatic/GI:  Hepatic/GI Normal                 Musculoskeletal:     Fourniers Gangrene            Neurological:  Neurology Normal                                      Endocrine:  Diabetes   DKA      Morbid Obesity / BMI > 40      Physical Exam  General: Well nourished and Cooperative    Airway:  Mallampati: III   Mouth Opening: Small, but > 3cm  TM Distance: Normal    Dental:  Dentures    Chest/Lungs:  Clear to auscultation, Normal Respiratory Rate    Heart:  Rate: Normal  Rhythm: Regular Rhythm        Anesthesia Plan  Type of Anesthesia, risks & benefits discussed:    Anesthesia Type: Gen ETT  Intra-op Monitoring Plan: Standard ASA Monitors  Post Op Pain Control Plan: multimodal analgesia and IV/PO Opioids PRN  Induction:  IV and rapid sequence  Airway Plan: Direct and Video, Post-Induction  Informed Consent: Informed consent signed with the Patient and all parties understand the risks and agree with anesthesia plan.  All questions answered. Patient consented to blood products? Yes  ASA Score: 4  Emergent  Anesthesia Plan Notes:   GA with OETT, RSI  Standard ASA monitors  Recovery in ICU  PONV: 3    Ready For Surgery From Anesthesia Perspective.     .

## 2024-01-30 NOTE — ASSESSMENT & PLAN NOTE
Body mass index is 56.49 kg/m². Morbid obesity complicates all aspects of disease management from diagnostic modalities to treatment. Weight loss encouraged and health benefits explained to patient.

## 2024-01-30 NOTE — ASSESSMENT & PLAN NOTE
53 year old F with undiagnosed, uncontrolled diabetes presenting in DKA with apparent founiers gangrene, LRINEC score of 11. Discussed severity of her current clinical status in context of necrotizing soft tissue infection and DKA with need for immediate surgical exploration and likely prolonged hospital stay. Patient and daughter express understanding.      - to OR class A for incision and drainage, exploration, debridement   - Consent obtained  - admission to ICU for post operative management  - Continue broad spectrum antibiotics to include clindamycin  - DKA, ALVARADO management per ICU   - Will likely have extensive wound care needs

## 2024-01-30 NOTE — ASSESSMENT & PLAN NOTE
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 Now s/p debridement on 1/30.    - return to OR 1/31 for potential further debridement  - NPO at midnight  - ALVARADO with low UOP postop   - Fluid challenge this morning with 1 L bolus and continued mIVF   - Urine Na, Cr, and osmolality ordered   - Trend renal labs  - Pain control with PCA  - Insulin gtt, management per primary  - Continue broad spectrum IV antibiotics with clindamycin  - Will likely have extensive wound care needs   - Continue ICU care

## 2024-01-30 NOTE — TREATMENT PLAN
General Surgery Treatment Plan    Patient seen and examined, chart reviewed. Previously undiagnosed uncontrolled diabetic presenting with fourniers gangrene in septic shock with ALVARADO, DKA.     Plan to proceed to OR for incision and debridement as class A tonight. Will require ICU admission post operatively.     Suzanne Woo MD  PGY-4, General Surgery  [facility]

## 2024-01-30 NOTE — SUBJECTIVE & OBJECTIVE
No past medical history on file.    No past surgical history on file.    Review of patient's allergies indicates:  No Known Allergies    No current facility-administered medications on file prior to encounter.     No current outpatient medications on file prior to encounter.     Family History    None       Tobacco Use    Smoking status: Not on file    Smokeless tobacco: Not on file   Substance and Sexual Activity    Alcohol use: Not on file    Drug use: Not on file    Sexual activity: Not on file     Review of Systems   Unable to perform ROS: Mental status change   (Post surgery, likely related to sedation)  Objective:     Vital Signs (Most Recent):  Temp: 98.1 °F (36.7 °C) (01/30/24 0008)  Pulse: 109 (01/30/24 0008)  Resp: 20 (01/30/24 0008)  BP: (!) 153/78 (01/30/24 0008)  SpO2: 100 % (FM) (01/30/24 0008) Vital Signs (24h Range):  Temp:  [98.1 °F (36.7 °C)-98.3 °F (36.8 °C)] 98.1 °F (36.7 °C)  Pulse:  [109-120] 109  Resp:  [20] 20  SpO2:  [90 %-100 %] 100 %  BP: (122-177)/(73-81) 153/78     Weight: (!) 158.8 kg (350 lb)  Body mass index is 56.49 kg/m².     Physical Exam  Vitals and nursing note reviewed.   Constitutional:       General: She is not in acute distress.     Appearance: Normal appearance. She is obese. She is not ill-appearing.   HENT:      Head: Normocephalic and atraumatic.      Nose: Nose normal.      Mouth/Throat:      Mouth: Mucous membranes are moist.   Eyes:      Extraocular Movements: Extraocular movements intact.   Cardiovascular:      Rate and Rhythm: Tachycardia present.      Pulses: Normal pulses.      Heart sounds: No murmur heard.  Pulmonary:      Effort: Pulmonary effort is normal. No respiratory distress.   Abdominal:      General: Abdomen is flat.      Palpations: Abdomen is soft.      Tenderness: There is no abdominal tenderness.   Musculoskeletal:      Right lower leg: No edema.      Left lower leg: No edema.      Comments: Right groin wound, packed    Skin:     General: Skin is  warm.      Capillary Refill: Capillary refill takes less than 2 seconds.   Neurological:      Mental Status: She is alert.      Comments: A&Ox2 (post anesthesia)                 Significant Labs: All pertinent labs within the past 24 hours have been reviewed.    Significant Imaging: I have reviewed all pertinent imaging results/findings within the past 24 hours.

## 2024-01-30 NOTE — CONSULTS
[place] - Emergency Dept  General Surgery  Consult Note    Patient Name: Sarah Saravia  MRN: [id number]  Code Status: No Order  Admission Date: 1/29/2024  Hospital Length of Stay: 0 days  Attending Physician: Jovan Gonzales MD  Primary Care Provider: [facility]    Patient information was obtained from patient, relative(s), and ER records.     Inpatient consult to General surgery  Consult performed by: Suzanne Woo MD  Consult ordered by: Yadi Vickers MD        Subjective:     Principal Problem: <principal problem not specified>    History of Present Illness: 53 year old morbidly obese female who does not routinely go to the doctor presents to the ED with malaise, nausea, vomiting, and groin, buttock, perineal swelling and tenderness. She began feeling ill a few days ago.     On arrival to the ED she is tachycardic to 120, hypertensive without fever. Labs demonstrate leukocytosis of 21, , with lactic acidosis and associated ALVARADO with cr 3.8, hyperglycemia with blood glucose of 824 accompanied by severe electrolyte derangements. CT imaging obtained demonstrates diffuse subcutaneous air along buttocks, perineum, anterior vulva, as well as bilateral thighs.     No prior abdominal surgeries. She denies problems with her heart or lungs. Non smoker. Does not routinely take any medications.    No current facility-administered medications on file prior to encounter.     No current outpatient medications on file prior to encounter.       Review of patient's allergies indicates:  No Known Allergies    No past medical history on file.  No past surgical history on file.  Family History    None       Tobacco Use    Smoking status: Not on file    Smokeless tobacco: Not on file   Substance and Sexual Activity    Alcohol use: Not on file    Drug use: Not on file    Sexual activity: Not on file     Review of Systems   Constitutional:  Positive for chills and fatigue.   HENT:  Negative.     Respiratory:  Positive for shortness of breath.    Cardiovascular:  Negative for chest pain.   Gastrointestinal:  Positive for nausea. Negative for abdominal distention, constipation and vomiting.   Genitourinary:  Negative for dysuria.   Skin:  Positive for color change and wound.   Neurological:  Positive for light-headedness and headaches.   Psychiatric/Behavioral:  Positive for confusion.      Objective:     Vital Signs (Most Recent):  Temp: 98.3 °F (36.8 °C) (01/29/24 1822)  Pulse: (!) 111 (01/29/24 2002)  Resp: 20 (01/29/24 1925)  BP: (!) 170/74 (01/29/24 2002)  SpO2: 95 % (01/29/24 2017) Vital Signs (24h Range):  Temp:  [98.3 °F (36.8 °C)] 98.3 °F (36.8 °C)  Pulse:  [111-120] 111  Resp:  [20] 20  SpO2:  [90 %-96 %] 95 %  BP: (122-177)/(73-81) 170/74     Weight: (!) 158.8 kg (350 lb)  Body mass index is 56.49 kg/m².     Physical Exam  Vitals and nursing note reviewed.   Constitutional:       Appearance: She is obese. She is ill-appearing.      Comments: Lethargic    HENT:      Head: Normocephalic and atraumatic.   Cardiovascular:      Rate and Rhythm: Normal rate and regular rhythm.   Pulmonary:      Effort: Pulmonary effort is normal. No respiratory distress.   Abdominal:      General: Abdomen is flat. There is no distension.      Palpations: Abdomen is soft. There is no mass.      Tenderness: There is no abdominal tenderness.      Hernia: No hernia is present.   Skin:     Comments: Induration of bilateral inner thighs at junction of buttocks with overlying skin changes, foul smell, tenderness, difficult exam given habitus, positioning, tenderness  Induration of pubis     Neurological:      General: No focal deficit present.      Mental Status: She is alert and oriented to person, place, and time.   Psychiatric:         Mood and Affect: Mood normal.         Behavior: Behavior normal.            I have reviewed all pertinent lab results within the past 24 hours.    Significant Diagnostics:  I  have reviewed all pertinent imaging results/findings within the past 24 hours.    Assessment/Plan:     Ayaz's gangrene  53 year old F with undiagnosed, uncontrolled diabetes presenting in DKA with apparent founiers gangrene, LRINEC score of 11. Discussed severity of her current clinical status in context of necrotizing soft tissue infection and DKA with need for immediate surgical exploration and likely prolonged hospital stay. Patient and daughter express understanding.      - to OR class A for incision and drainage, exploration, debridement   - Consent obtained  - admission to ICU for post operative management  - Continue broad spectrum antibiotics to include clindamycin  - DKA, ALVARADO management per ICU   - Will likely have extensive wound care needs       VTE Risk Mitigation (From admission, onward)           Ordered     IP VTE HIGH RISK PATIENT  Once         01/29/24 2052     Place sequential compression device  Until discontinued         01/29/24 2052                    Thank you for your consult. I will follow-up with patient. Please contact us if you have any additional questions.    Suzanne Woo MD  General Surgery  [facility] - [place] - Emergency Dept

## 2024-01-30 NOTE — ASSESSMENT & PLAN NOTE
This patient does have evidence of infective focus  My overall impression is sepsis.  Source: Skin and Soft Tissue (location groin)  Antibiotics given-   Antibiotics (72h ago, onward)      Start     Stop Route Frequency Ordered    01/30/24 0530  clindamycin injection 900 mg         -- IM Every 8 hours (non-standard times) 01/30/24 0017    01/30/24 0330  piperacillin-tazobactam (ZOSYN) 4.5 g in dextrose 5 % in water (D5W) 100 mL IVPB (MB+)         -- IV Every 8 hours (non-standard times) 01/30/24 0017    01/29/24 2000  vancomycin (VANCOCIN) 2,500 mg in dextrose 5 % (D5W) 500 mL IVPB         -- IV Once 01/29/24 1931 01/29/24 1944  vancomycin - pharmacy to dose  (vancomycin IVPB (PEDS and ADULTS))        See Hyperspace for full Linked Orders Report.    -- IV pharmacy to manage frequency 01/29/24 1846          Latest lactate reviewed-  Recent Labs   Lab 01/29/24  1950   POCLAC 5.64*     Organ dysfunction indicated by Acute kidney injury    Fluid challenge Ideal Body Weight- The patient's ideal body weight is Ideal body weight: 59.3 kg (130 lb 11.7 oz) which will be used to calculate fluid bolus of 30 ml/kg for treatment of septic shock.      Post- resuscitation assessment Yes Perfusion exam was performed within 6 hours of septic shock presentation after bolus shows Adequate tissue perfusion assessed by non-invasive monitoring       Will Not start Pressors- Levophed for MAP of 65  Source control achieved by: Abx

## 2024-01-30 NOTE — ED PROVIDER NOTES
Encounter Date: 1/29/2024    SCRIBE #1 NOTE: I, JERARDO Levin, am scribing for, and in the presence of,  Yadi Vickers MD. I have scribed the following portions of the note - Other sections scribed: HPI, ROS, PE.       History     Chief Complaint   Patient presents with    Vomiting     Vomiting and diarrhea for 3-4 days.   Wound to posterior right thigh.      Sarah Saravia is a 53 y.o. female, with no pertinent PMHx, who presents to the ED with nausea/vomiting which started 4 days ago. Associated symptoms include light headedness, decreased appetite, and urinary frequency. Pt also complains of a wound to right upper thigh/groin area. She denies any drainage from wound. No exacerbating or alleviating factors. Denies any other associated symptoms. NKDA. Denies use of any daily medications. She has not seen a doctor in several years.     The history is provided by the patient and a relative. No  was used.     Review of patient's allergies indicates:  No Known Allergies  No past medical history on file.  No past surgical history on file.  No family history on file.     Review of Systems   Constitutional:  Positive for appetite change. Negative for chills and fever.   HENT:  Negative for congestion and sore throat.    Eyes:  Negative for visual disturbance.   Respiratory:  Negative for cough and shortness of breath.    Cardiovascular:  Negative for chest pain.   Gastrointestinal:  Positive for nausea and vomiting. Negative for abdominal pain.   Endocrine: Positive for polydipsia and polyuria.   Genitourinary:  Positive for frequency. Negative for dysuria.   Skin:  Positive for wound (right upper thigh).   Neurological:  Positive for light-headedness. Negative for headaches.   Psychiatric/Behavioral:  Negative for decreased concentration.        Physical Exam     Initial Vitals [01/29/24 1822]   BP Pulse Resp Temp SpO2   122/73 (!) 120 20 98.3 °F (36.8 °C) 96 %      MAP       --         Physical  Exam    Nursing note and vitals reviewed.  Constitutional: She appears well-developed and well-nourished. She is not diaphoretic. She appears ill. No distress.   Body mass index is 56.49 kg/m².     HENT:   Mouth/Throat: Mucous membranes are dry.   Mucous membranes are dry   Eyes: Conjunctivae are normal. Pupils are equal, round, and reactive to light.   Neck: Neck supple.   Cardiovascular:  Regular rhythm.   Tachycardia present.         Pulmonary/Chest: Breath sounds normal.   Abdominal: Abdomen is soft. Bowel sounds are normal. She exhibits no distension. There is no abdominal tenderness.   Musculoskeletal:         General: No edema.      Cervical back: Neck supple.     Neurological: She is alert. GCS score is 15. GCS eye subscore is 4. GCS verbal subscore is 5. GCS motor subscore is 6.   Skin: Skin is warm and dry.   Induration and swelling to right upper thigh/groin area with crepitus, induration and crepitus extends to upper medial and posterior thigh. No active drainage.   Psychiatric: She has a normal mood and affect.         ED Course   Critical Care    Date/Time: 1/29/2024 9:23 PM    Performed by: Yadi Vickers MD  Authorized by: Jovan Gonzales MD  Total critical care time (exclusive of procedural time) : 0 minutes  Comments: Please put in 50 minutes of critical care due to patient having a high risk of renal failure, severe sepsis.   Separate from teaching and exclusive of procedure and ekg time  Includes:  Time at bedside  Time reviewing test results  Time discussing case with staff  Time documenting the medical record  Time spent with family members  Time spent with consults  Management            Labs Reviewed   CBC W/ AUTO DIFFERENTIAL - Abnormal; Notable for the following components:       Result Value    WBC 21.60 (*)     MCH 26.1 (*)     MCHC 31.2 (*)     RDW 14.9 (*)     Immature Granulocytes 1.2 (*)     Gran # (ANC) 19.0 (*)     Immature Grans (Abs) 0.25 (*)     Gran % 87.8 (*)     Lymph  % 5.6 (*)     All other components within normal limits   COMPREHENSIVE METABOLIC PANEL - Abnormal; Notable for the following components:    Sodium 130 (*)     Chloride 94 (*)     CO2 17 (*)     Glucose 824 (*)     BUN 44 (*)     Creatinine 3.8 (*)     Albumin 2.6 (*)     Alkaline Phosphatase 151 (*)     eGFR 14 (*)     Anion Gap 19 (*)     All other components within normal limits    Narrative:     GLUCOSE   critical result(s) called and verbal readback obtained from   MARGARET ORTEGA by LM1 01/29/2024 20:11   C-REACTIVE PROTEIN - Abnormal; Notable for the following components:    .2 (*)     All other components within normal limits   BETA - HYDROXYBUTYRATE, SERUM - Abnormal; Notable for the following components:    Beta-Hydroxybutyrate 3.2 (*)     All other components within normal limits   ISTAT LACTATE - Abnormal; Notable for the following components:    POC Lactate 5.64 (*)     All other components within normal limits   POCT GLUCOSE - Abnormal; Notable for the following components:    POCT Glucose >500 (*)     All other components within normal limits   ISTAT PROCEDURE - Abnormal; Notable for the following components:    POC PH 7.293 (*)     POC PO2 28 (*)     POC HCO3 19.8 (*)     POC BE -6 (*)     POC TCO2 21 (*)     All other components within normal limits   POCT GLUCOSE - Abnormal; Notable for the following components:    POCT Glucose >500 (*)     All other components within normal limits   CULTURE, BLOOD   CULTURE, BLOOD   URINALYSIS, REFLEX TO URINE CULTURE   POCT INFLUENZA A/B MOLECULAR   SARS-COV-2 RDRP GENE   POCT GLUCOSE MONITORING CONTINUOUS        ECG Results              EKG 12-lead (Preliminary result)  Result time 01/29/24 20:03:09      Wet Read by Yadi Vickers MD (01/29/24 20:03:09, [facility] [place] [facility], Emergency Medicine)    Sinus tachycardia, rate 111 beats per minute, normal MD interval,  milliseconds, no STEMI.                                  Imaging Results               CT Abdomen Pelvis  Without Contrast (Final result)  Result time 01/29/24 21:16:49   Procedure changed from CT Abdomen Pelvis With IV Contrast NO Oral Contrast     Final result by Gaby Childers MD (01/29/24 21:16:49)                   Impression:      1. Extensive soft tissue air throughout the right groin and inguinal region and extending to involve the right lower quadrant anterior abdominal wall with extensive soft tissue air also seen involving the proximal medial aspect of the right thigh.  This is most concerning for gas-forming infection.  No organized focal fluid collection or abscess seen.  2. Otherwise no additional acute intra-abdominal abnormalities identified.  3. Hepatomegaly.      Electronically signed by: Gaby Childers MD  Date:    01/29/2024  Time:    21:16               Narrative:    EXAMINATION:  CT ABDOMEN PELVIS WITHOUT CONTRAST    CLINICAL HISTORY:  Abdominal abscess/infection suspected;swelling and crepitus to R groin;    TECHNIQUE:  Low dose axial images, sagittal and coronal reformations were obtained from the lung bases to the pubic symphysis.  Oral contrast was not administered.    COMPARISON:  None    FINDINGS:  The visualized portion of the heart is unremarkable.  Mild bibasilar atelectatic changes are seen.  No evidence of focal consolidation or pleural effusion.    Liver is enlarged measuring 20 cm.  No significant hepatic abnormality seen on this noncontrast exam.  There is no intra-or extrahepatic biliary ductal dilatation.  The gallbladder is unremarkable.  The stomach, pancreas, spleen, and adrenal glands are unremarkable.    Kidneys show no evidence of stones or hydronephrosis. Ureters are unremarkable along their courses.  Urinary bladder is nondistended.  Uterus is unremarkable.  No significant adnexal abnormalities are seen.    Appendix is visualized and is unremarkable.  The visualized loops of small and large bowel show no evidence of obstruction or inflammation.   No free air or free fluid.    Aorta tapers normally.    No acute osseous abnormality identified. Mild degenerative changes are seen in the spine.    Extensive soft tissue air is seen within the subcutaneous soft tissues of the right groin and inguinal region.  This extends into the right lower quadrant anterior abdominal wall with extensive air also seen involving the proximal medial aspect of the right thigh.  No organized fluid collection or abscess seen.                                       X-Ray Chest AP Portable (Final result)  Result time 01/29/24 19:49:41      Final result by Gaby Childers MD (01/29/24 19:49:41)                   Impression:      No acute cardiopulmonary process identified.      Electronically signed by: Gaby Childers MD  Date:    01/29/2024  Time:    19:49               Narrative:    EXAMINATION:  XR CHEST AP PORTABLE    CLINICAL HISTORY:  Sepsis;    TECHNIQUE:  Single frontal view of the chest was performed.    COMPARISON:  None    FINDINGS:  Cardiac silhouette is normal in size.  Lungs are symmetrically expanded.  No evidence of focal consolidative process, pneumothorax, or significant pleural effusion.  No acute osseous abnormality identified.                                       Medications   vancomycin - pharmacy to dose (has no administration in time range)   vancomycin (VANCOCIN) 2,500 mg in dextrose 5 % (D5W) 500 mL IVPB (has no administration in time range)   sodium chloride 0.9% bolus 1,000 mL 1,000 mL (1,000 mLs Intravenous New Bag 1/29/24 2124)   sodium chloride 0.9% flush 10 mL (has no administration in time range)   0.9%  NaCl infusion (has no administration in time range)   dextrose 5 % and 0.45 % NaCl infusion (has no administration in time range)   dextrose 10 % infusion (has no administration in time range)   dextrose 10 % infusion (has no administration in time range)   insulin regular in 0.9 % NaCl 100 unit/100 mL (1 unit/mL) infusion (has no administration in  time range)   dextrose 10% bolus 125 mL 125 mL (has no administration in time range)   dextrose 10% bolus 250 mL 250 mL (has no administration in time range)   clindamycin in D5W 900 mg/50 mL IVPB 900 mg (has no administration in time range)   sodium chloride 0.9% bolus 1,779 mL 1,779 mL (1,779 mLs Intravenous New Bag 1/29/24 1937)   piperacillin-tazobactam (ZOSYN) 4.5 g in dextrose 5 % in water (D5W) 100 mL IVPB (MB+) (4.5 g Intravenous New Bag 1/29/24 1936)   ondansetron injection 4 mg (4 mg Intravenous Given 1/29/24 1926)   morphine injection 4 mg (4 mg Intravenous Given 1/29/24 1925)     Medical Decision Making   53-year-old female presents with nausea, vomiting, wound.  Symptoms started several days ago, she reports vomiting frequently, increased thirst, increased urination.  She has a wound that is painful to her right thigh, right groin.  The patient denies past medical history.  On exam, the patient is tachycardic, she is ill-appearing.  Mucous membranes are dry.  Her abdomen is soft and nontender.  She has swelling with crepitus to her right groin, there is no open lesions or active drainage.  Differential includes not limited to new onset diabetes, Ayaz's gangrene, abscess, sepsis.  Workup initiated with labs, cultures, will start broad-spectrum antibiotics.  Giving 30 mL/kg of IV fluids per ideal body weight as the patient is obese with an elevated BMI.    Amount and/or Complexity of Data Reviewed  Labs: ordered.     Details: Point of care lactate 5.64.  COVID and flu negative.  .2.  CMP shows a bicarb of 17, glucose 824, creatinine 3.8, anion gap 19. Ph 7.29, serup ketones 3.2  Sodium is 130 but corrects to 142 when accounting for hyperglycemia.   Radiology: ordered.     Details: CT shows extensive soft tissue air throughout the right groin and inguinal region, extending into the right lower quadrant anterior abdominal wall as well as the proximal medial aspect of the right thigh.  There is no  organized focal fluid collection or abscess seen on this noncontrast study.  ECG/medicine tests: ordered and independent interpretation performed.    Risk  Prescription drug management.  Decision regarding hospitalization.  Emergency major surgery.    This patient does have evidence of infective focus  My overall impression is sepsis.  Source: Skin and Soft Tissue (location right groin)  Antibiotics given-   Antibiotics (72h ago, onward)      Start     Stop Route Frequency Ordered    01/29/24 2100  clindamycin in D5W 900 mg/50 mL IVPB 900 mg         01/30/24 0859 IV ED 1 Time 01/29/24 2053 01/29/24 2000  vancomycin (VANCOCIN) 2,500 mg in dextrose 5 % (D5W) 500 mL IVPB         -- IV Once 01/29/24 1931 01/29/24 1944  vancomycin - pharmacy to dose  (vancomycin IVPB (PEDS and ADULTS))        See Hyperspace for full Linked Orders Report.    -- IV pharmacy to manage frequency 01/29/24 1846          Latest lactate reviewed-  Recent Labs   Lab 01/29/24  1950   POCLAC 5.64*     Organ dysfunction indicated by Acute kidney injury    Fluid challenge Ideal Body Weight- The patient's ideal body weight is Ideal body weight: 59.3 kg (130 lb 11.7 oz) which will be used to calculate fluid bolus of 30 ml/kg for treatment of septic shock.      Post- resuscitation assessment Yes Perfusion exam was performed within 6 hours of septic shock presentation after bolus shows Adequate tissue perfusion assessed by non-invasive monitoring       Will Not start Pressors- Levophed for MAP of 65  Source control achieved by: antibiotics, patient also going to surgery tonight emergently          Scribe Attestation:   Scribe #1: I performed the above scribed service and the documentation accurately describes the services I performed. I attest to the accuracy of the note.        ED Course as of 01/29/24 2126 Mon Jan 29, 2024 1852 Case reviewed with general surgeon, Dr. Suzanne Woo, who requests CPR in addition to other labs ordered.  [LH]    2053 Dr. Woo at bedside assessing patient.  [LH]      ED Course User Index  [LH] Yadi Vickers MD                 Medical Decision Making:   Clinical Tests:   Sepsis Perfusion Assessment: "I attest a sepsis perfusion exam was performed within 6 hours of sepsis, severe sepsis, or septic shock presentation, following fluid resuscitation."           I, Yadi Vickers MD, personally performed the services described in this documentation.  All medical record entries made by the scribe were at my direction and in my presence.  I have reviewed the chart and agree that the record reflects my personal performance and is accurate and complete.    This dictation has been generated using M-Modal Fluency Direct dictation; some phonetic errors may occur.     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

## 2024-01-30 NOTE — SUBJECTIVE & OBJECTIVE
No current facility-administered medications on file prior to encounter.     No current outpatient medications on file prior to encounter.       Review of patient's allergies indicates:  No Known Allergies    No past medical history on file.  No past surgical history on file.  Family History    None       Tobacco Use    Smoking status: Not on file    Smokeless tobacco: Not on file   Substance and Sexual Activity    Alcohol use: Not on file    Drug use: Not on file    Sexual activity: Not on file     Review of Systems   Constitutional:  Positive for chills and fatigue.   HENT: Negative.     Respiratory:  Positive for shortness of breath.    Cardiovascular:  Negative for chest pain.   Gastrointestinal:  Positive for nausea. Negative for abdominal distention, constipation and vomiting.   Genitourinary:  Negative for dysuria.   Skin:  Positive for color change and wound.   Neurological:  Positive for light-headedness and headaches.   Psychiatric/Behavioral:  Positive for confusion.      Objective:     Vital Signs (Most Recent):  Temp: 98.3 °F (36.8 °C) (01/29/24 1822)  Pulse: (!) 111 (01/29/24 2002)  Resp: 20 (01/29/24 1925)  BP: (!) 170/74 (01/29/24 2002)  SpO2: 95 % (01/29/24 2017) Vital Signs (24h Range):  Temp:  [98.3 °F (36.8 °C)] 98.3 °F (36.8 °C)  Pulse:  [111-120] 111  Resp:  [20] 20  SpO2:  [90 %-96 %] 95 %  BP: (122-177)/(73-81) 170/74     Weight: (!) 158.8 kg (350 lb)  Body mass index is 56.49 kg/m².     Physical Exam  Vitals and nursing note reviewed.   Constitutional:       Appearance: She is obese. She is ill-appearing.      Comments: Lethargic    HENT:      Head: Normocephalic and atraumatic.   Cardiovascular:      Rate and Rhythm: Normal rate and regular rhythm.   Pulmonary:      Effort: Pulmonary effort is normal. No respiratory distress.   Abdominal:      General: Abdomen is flat. There is no distension.      Palpations: Abdomen is soft. There is no mass.      Tenderness: There is no abdominal  tenderness.      Hernia: No hernia is present.   Skin:     Comments: Induration of bilateral inner thighs at junction of buttocks with overlying skin changes, foul smell, tenderness, difficult exam given habitus, positioning, tenderness  Induration of pubis     Neurological:      General: No focal deficit present.      Mental Status: She is alert and oriented to person, place, and time.   Psychiatric:         Mood and Affect: Mood normal.         Behavior: Behavior normal.            I have reviewed all pertinent lab results within the past 24 hours.    Significant Diagnostics:  I have reviewed all pertinent imaging results/findings within the past 24 hours.

## 2024-01-30 NOTE — ASSESSMENT & PLAN NOTE
Patient has hyponatremia which is uncontrolled,We will aim to correct the sodium by 4-6mEq in 24 hours. We will monitor sodium Every 4 hours. The hyponatremia is due to Dehydration/hypovolemia. We will obtain the following studies: Urine sodium, urine osmolality, serum osmolality. We will treat the hyponatremia with IV fluids as follows: per DKA protocol. The patient's sodium results have been reviewed and are listed below.  Recent Labs   Lab 01/29/24 1929   *

## 2024-01-30 NOTE — PLAN OF CARE
Problem: Adult Inpatient Plan of Care  Goal: Plan of Care Review  Outcome: Ongoing, Progressing  Goal: Readiness for Transition of Care  Outcome: Ongoing, Not Progressing     Problem: Diabetes Comorbidity  Goal: Blood Glucose Level Within Targeted Range  Outcome: Ongoing, Progressing     Problem: Glycemic Control Impaired (Sepsis/Septic Shock)  Goal: Blood Glucose Level Within Desired Range  Outcome: Ongoing, Progressing     Problem: Nutrition Impaired (Sepsis/Septic Shock)  Goal: Optimal Nutrition Intake  Outcome: Ongoing, Not Progressing     Problem: Diabetic Ketoacidosis  Goal: Fluid and Electrolyte Balance with Absence of Ketosis  Outcome: Ongoing, Progressing     Problem: Renal Function Impairment (Acute Kidney Injury/Impairment)  Goal: Effective Renal Function  Outcome: Ongoing, Not Progressing

## 2024-01-30 NOTE — CARE UPDATE
Patient has been seen and examinated,53-year-old female with a past medical history of morbid obesity who presents with vomiting.DKA and ros gangrene,  Per CT,  Extensive soft tissue air throughout the right groin and inguinal region and extending to involve the right lower quadrant anterior abdominal wall with extensive soft tissue air also seen involving the proximal medial aspect of the right thigh.  This is most concerning for gas-forming infection.   S/P urgent debridement by surgery,on broad spectrum IV Abx,cultures are pending.  Has elevated Cr. No prior record for comparison,on IVF and nephrology is consulted.  Replaced phosphorous,  DKA with insulin gtt is improving.  Plan for another debridement in AM.

## 2024-01-30 NOTE — CLINICAL REVIEW
Sepsis Screen (most recent)       Sepsis Screen (IP) - 01/30/24 0755       Is the patient's history or complaint suggestive of a possible infection? Yes  -    Are there at least two of the following signs and symptoms present? Yes  -    Sepsis signs/symptoms - Tachycardia Tachycardia     >90  -    Sepsis signs/symptoms - Tachypnea Tachypnea     >20  -    Sepsis signs/symptoms - WBC WBC < 4,000 or WBC > 12,000  -    Are any of the following organ dysfunction criteria present and not considered to be due to a chronic condition? Yes  -    Organ Dysfunction Criteria Creatinine > 2.0  -    Organ Dysfunction Criteria Lactate > 2.0  -    Initiate Sepsis Protocol No  -    Reason sepsis not considered Pt. receiving appropriate management  -              User Key  (r) = Recorded By, (t) = Taken By, (c) = Cosigned By      Initials Name    Kathy Velasquez RN

## 2024-01-30 NOTE — PROGRESS NOTES
Dr Ponce and surgery team @ bs discussing POC with spouse. Informed of decreased Urine Output and Lethargic. Orders entered.

## 2024-01-30 NOTE — SUBJECTIVE & OBJECTIVE
Interval History: NAEO. Remains somnolent. Low grade tachycardia, normotensive and afebrile. Dressings to right medial thigh debridement site CDI. UOP low postop with only 125 mL overnight. Lab work with Cr elevated at 3.6, glucose better controlled on insulin gtt, down to 160 this morning.    Medications:  Continuous Infusions:   sodium chloride 0.9% 125 mL/hr (01/30/24 0800)    dextrose 5 % and 0.45 % NaCl      hydromorphone in 0.9 % NaCl 6 mg/30 ml      insulin regular 1 units/mL infusion orderable (DKA) 0.05 Units/kg/hr (01/30/24 0858)     Scheduled Meds:   acetaminophen  1,000 mg Oral Q8H    clindamycin  900 mg Intramuscular Q8H    famotidine (PF)  20 mg Intravenous Daily    lactated ringers  1,000 mL Intravenous Once    mupirocin   Nasal BID    piperacillin-tazobactam (Zosyn) IV (PEDS and ADULTS) (extended infusion is not appropriate)  4.5 g Intravenous Q8H    sodium phosphate 30 mmol in dextrose 5 % (D5W) 250 mL IVPB  30 mmol Intravenous Once     PRN Meds:acetaminophen, albuterol-ipratropium, dextrose 10 % in water (D10W), dextrose 10 % in water (D10W), dextrose 10%, dextrose 10%, dextrose 5 % and 0.45 % NaCl, melatonin, naloxone, ondansetron, potassium chloride **AND** potassium chloride **AND** potassium chloride, prochlorperazine, sodium chloride 0.9%, Pharmacy to dose Vancomycin consult **AND** vancomycin - pharmacy to dose     Review of patient's allergies indicates:  No Known Allergies  Objective:     Vital Signs (Most Recent):  Temp: 98.6 °F (37 °C) (01/30/24 0701)  Pulse: 105 (01/30/24 0830)  Resp: (!) 27 (01/30/24 0830)  BP: (!) 140/88 (01/30/24 0830)  SpO2: 95 % (01/30/24 0830) Vital Signs (24h Range):  Temp:  [98.1 °F (36.7 °C)-100.2 °F (37.9 °C)] 98.6 °F (37 °C)  Pulse:  [103-121] 105  Resp:  [20-39] 27  SpO2:  [90 %-100 %] 95 %  BP: (108-177)/(62-89) 140/88     Weight: (!) 153.1 kg (337 lb 8.4 oz)  Body mass index is 54.48 kg/m².    Intake/Output - Last 3 Shifts         01/28 0700  01/29 0659  01/29 0700  01/30 0659 01/30 0700  01/31 0659    I.V. (mL/kg)  838.8 (5.5) 199.6 (1.3)    IV Piggyback  3874 22.4    Total Intake(mL/kg)  4712.8 (30.8) 221.9 (1.4)    Urine (mL/kg/hr)  360 5 (0)    Blood  200     Total Output  560 5    Net  +4152.8 +216.9                    Physical Exam  Vitals reviewed.   Constitutional:       General: She is not in acute distress.     Appearance: She is obese.   HENT:      Head: Normocephalic.   Cardiovascular:      Rate and Rhythm: Regular rhythm. Tachycardia present.      Pulses: Normal pulses.   Pulmonary:      Effort: Pulmonary effort is normal.      Comments: 2 L NC  Abdominal:      Palpations: Abdomen is soft.      Tenderness: There is no abdominal tenderness.   Musculoskeletal:      Comments: Dressings C/D/I. Surrounding skin soft with decreased erythema   Skin:     General: Skin is warm and dry.   Neurological:      General: No focal deficit present.      Comments: somnolent          Significant Labs:  I have reviewed all pertinent lab results within the past 24 hours.  CBC:   Recent Labs   Lab 01/30/24  0447   WBC 14.73*   RBC 4.54   HGB 12.0   HCT 37.9      MCV 84   MCH 26.4*   MCHC 31.7*     CMP:   Recent Labs   Lab 01/29/24  1929 01/30/24  0042 01/30/24  0447   *   < > 412*   CALCIUM 8.9   < > 8.3*   ALBUMIN 2.6*  --   --    PROT 8.0  --   --    *   < > 134*   K 4.2   < > 3.8   CO2 17*   < > 17*   CL 94*   < > 103   BUN 44*   < > 46*   CREATININE 3.8*   < > 3.6*   ALKPHOS 151*  --   --    ALT 30  --   --    AST 40  --   --    BILITOT 0.7  --   --     < > = values in this interval not displayed.       Significant Diagnostics:  I have reviewed all pertinent imaging results/findings within the past 24 hours.

## 2024-01-30 NOTE — HPI
This is a 53-year-old female with a past medical history of morbid obesity who presents with vomiting.     Patient presents with nausea, vomiting that started 4 days prior to presentation.  She also reports a wound in her right groin area without associated drainage.    In the ED, the patient was tachycardic (up to 120s) and hypertensive.  Labs were remarkable for leukocytosis (21.6), elevated creatinine (3.8-unknown baseline), elevated lactic acid (5.6), hyponatremia (130), elevated CRP (530.2) and were suggestive of DKA (B, A, CO2: 17, BHB: 3.2, PH: 7.293).  CT abdomen and pelvis showed extensive soft tissue air throughout the right groin and inguinal region and extending to involve the right lower quadrant anterior abdominal wall with extensive soft tissue air also seen involving the proximal medial aspect of the right thigh, concerning for gas-forming infection.  General surgery was consulted and the patient was taken to the OR for debridement.  Patient was given 2.7 L of NS, vancomycin, Zosyn, clindamycin, morphine 4 mg IV, Zofran 4 mg IV and was started on an insulin drip.  She was admitted for further management.

## 2024-01-30 NOTE — ANESTHESIA POSTPROCEDURE EVALUATION
Anesthesia Post Evaluation    Patient: Sarah Saravia    Procedure(s) Performed: Procedure(s) (LRB):  INCISION AND DRAINAGE, ABSCESS (N/A)    Final Anesthesia Type: general      Patient location during evaluation: ICU  Patient participation: Yes- Able to Participate  Level of consciousness: awake and alert and oriented  Post-procedure vital signs: reviewed and stable  Pain management: adequate  Airway patency: patent    PONV status at discharge: No PONV  Anesthetic complications: no      Cardiovascular status: hemodynamically stable and blood pressure returned to baseline  Respiratory status: spontaneous ventilation, room air and unassisted  Hydration status: euvolemic  Follow-up not needed.              Vitals Value Taken Time   /81 01/30/24 0101   Temp 36.7 °C (98.1 °F) 01/30/24 0008   Pulse 112 01/30/24 0130   Resp 28 01/30/24 0130   SpO2 98 % 01/30/24 0130   Vitals shown include unvalidated device data.      No case tracking events are documented in the log.      Pain/Lucía Score: Pain Rating Prior to Med Admin: 0 (1/30/2024  1:06 AM)

## 2024-01-30 NOTE — ANESTHESIA PROCEDURE NOTES
Intubation    Date/Time: 1/29/2024 10:29 PM    Performed by: Parviz España CRNA  Authorized by: Steven Magaña MD    Intubation:     Induction:  Rapid sequence induction    Intubated:  Postinduction    Mask Ventilation:  Not attempted    Attempts:  1    Attempted By:  CRNA    Method of Intubation:  Video laryngoscopy    Blade:  [place] 3    Laryngeal View Grade: Grade I - full view of cords      Difficult Airway Encountered?: No      Complications:  None    Airway Device:  Oral endotracheal tube    Airway Device Size:  7.0    Style/Cuff Inflation:  Cuffed (inflated to minimal occlusive pressure)    Inflation Amount (mL):  5    Tube secured:  21    Secured at:  The lips    Placement Verified By:  Capnometry    Complicating Factors:  None    Findings Post-Intubation:  BS equal bilateral and atraumatic/condition of teeth unchanged

## 2024-01-31 ENCOUNTER — ANESTHESIA (OUTPATIENT)
Dept: SURGERY | Facility: HOSPITAL | Age: 54
DRG: 004 | End: 2024-01-31
Payer: MEDICAID

## 2024-01-31 PROBLEM — E87.20 METABOLIC ACIDOSIS: Status: ACTIVE | Noted: 2024-01-31

## 2024-01-31 LAB
ALLENS TEST: ABNORMAL
ALLENS TEST: ABNORMAL
ANION GAP SERPL CALC-SCNC: 13 MMOL/L (ref 8–16)
ANION GAP SERPL CALC-SCNC: 14 MMOL/L (ref 8–16)
ANION GAP SERPL CALC-SCNC: 15 MMOL/L (ref 8–16)
ANION GAP SERPL CALC-SCNC: 17 MMOL/L (ref 8–16)
BASOPHILS # BLD AUTO: ABNORMAL K/UL (ref 0–0.2)
BASOPHILS NFR BLD: 0 % (ref 0–1.9)
BUN SERPL-MCNC: 51 MG/DL (ref 6–20)
BUN SERPL-MCNC: 51 MG/DL (ref 6–20)
BUN SERPL-MCNC: 55 MG/DL (ref 6–20)
BUN SERPL-MCNC: 58 MG/DL (ref 6–20)
CALCIUM SERPL-MCNC: 7.5 MG/DL (ref 8.7–10.5)
CALCIUM SERPL-MCNC: 7.5 MG/DL (ref 8.7–10.5)
CALCIUM SERPL-MCNC: 7.8 MG/DL (ref 8.7–10.5)
CALCIUM SERPL-MCNC: 8.3 MG/DL (ref 8.7–10.5)
CHLORIDE SERPL-SCNC: 102 MMOL/L (ref 95–110)
CHLORIDE SERPL-SCNC: 103 MMOL/L (ref 95–110)
CHLORIDE SERPL-SCNC: 105 MMOL/L (ref 95–110)
CHLORIDE SERPL-SCNC: 107 MMOL/L (ref 95–110)
CO2 SERPL-SCNC: 14 MMOL/L (ref 23–29)
CO2 SERPL-SCNC: 16 MMOL/L (ref 23–29)
CO2 SERPL-SCNC: 16 MMOL/L (ref 23–29)
CO2 SERPL-SCNC: 17 MMOL/L (ref 23–29)
CREAT SERPL-MCNC: 4.7 MG/DL (ref 0.5–1.4)
CREAT SERPL-MCNC: 5 MG/DL (ref 0.5–1.4)
CREAT SERPL-MCNC: 5.4 MG/DL (ref 0.5–1.4)
CREAT SERPL-MCNC: 5.4 MG/DL (ref 0.5–1.4)
DELSYS: ABNORMAL
DELSYS: ABNORMAL
DIFFERENTIAL METHOD BLD: ABNORMAL
EOSINOPHIL # BLD AUTO: ABNORMAL K/UL (ref 0–0.5)
EOSINOPHIL NFR BLD: 1 % (ref 0–8)
EP: 6
ERYTHROCYTE [DISTWIDTH] IN BLOOD BY AUTOMATED COUNT: 14.5 % (ref 11.5–14.5)
ERYTHROCYTE [SEDIMENTATION RATE] IN BLOOD BY WESTERGREN METHOD: 16 MM/H
ERYTHROCYTE [SEDIMENTATION RATE] IN BLOOD BY WESTERGREN METHOD: 18 MM/H
EST. GFR  (NO RACE VARIABLE): 10 ML/MIN/1.73 M^2
EST. GFR  (NO RACE VARIABLE): 11 ML/MIN/1.73 M^2
EST. GFR  (NO RACE VARIABLE): 9 ML/MIN/1.73 M^2
EST. GFR  (NO RACE VARIABLE): 9 ML/MIN/1.73 M^2
FIO2: 21
FIO2: 40
GLUCOSE SERPL-MCNC: 130 MG/DL (ref 70–110)
GLUCOSE SERPL-MCNC: 145 MG/DL (ref 70–110)
GLUCOSE SERPL-MCNC: 197 MG/DL (ref 70–110)
GLUCOSE SERPL-MCNC: 334 MG/DL (ref 70–110)
HCO3 UR-SCNC: 18.8 MMOL/L (ref 24–28)
HCO3 UR-SCNC: 20.8 MMOL/L (ref 24–28)
HCT VFR BLD AUTO: 35 % (ref 37–48.5)
HGB BLD-MCNC: 11.3 G/DL (ref 12–16)
IMM GRANULOCYTES # BLD AUTO: ABNORMAL K/UL (ref 0–0.04)
IMM GRANULOCYTES NFR BLD AUTO: ABNORMAL % (ref 0–0.5)
IP: 16
LACTATE SERPL-SCNC: 2.4 MMOL/L (ref 0.5–2.2)
LYMPHOCYTES # BLD AUTO: ABNORMAL K/UL (ref 1–4.8)
LYMPHOCYTES NFR BLD: 9 % (ref 18–48)
MCH RBC QN AUTO: 25.7 PG (ref 27–31)
MCHC RBC AUTO-ENTMCNC: 32.3 G/DL (ref 32–36)
MCV RBC AUTO: 80 FL (ref 82–98)
MIN VOL: 20
MODE: ABNORMAL
MODE: ABNORMAL
MONOCYTES # BLD AUTO: ABNORMAL K/UL (ref 0.3–1)
MONOCYTES NFR BLD: 4 % (ref 4–15)
NEUTROPHILS NFR BLD: 58 % (ref 38–73)
NEUTS BAND NFR BLD MANUAL: 28 %
NRBC BLD-RTO: 0 /100 WBC
PCO2 BLDA: 43.1 MMHG (ref 35–45)
PCO2 BLDA: 43.9 MMHG (ref 35–45)
PEEP: 5
PH SMN: 7.24 [PH] (ref 7.35–7.45)
PH SMN: 7.29 [PH] (ref 7.35–7.45)
PHOSPHATE SERPL-MCNC: 3.7 MG/DL (ref 2.7–4.5)
PLATELET # BLD AUTO: 197 K/UL (ref 150–450)
PMV BLD AUTO: 11.9 FL (ref 9.2–12.9)
PO2 BLDA: 23 MMHG (ref 40–60)
PO2 BLDA: 99 MMHG (ref 80–100)
POC BE: -6 MMOL/L
POC BE: -8 MMOL/L
POC SATURATED O2: 34 % (ref 95–100)
POC SATURATED O2: 96 % (ref 95–100)
POC TCO2: 20 MMOL/L (ref 23–27)
POC TCO2: 22 MMOL/L (ref 24–29)
POCT GLUCOSE: 183 MG/DL (ref 70–110)
POCT GLUCOSE: 219 MG/DL (ref 70–110)
POTASSIUM SERPL-SCNC: 3.8 MMOL/L (ref 3.5–5.1)
POTASSIUM SERPL-SCNC: 3.9 MMOL/L (ref 3.5–5.1)
POTASSIUM SERPL-SCNC: 3.9 MMOL/L (ref 3.5–5.1)
POTASSIUM SERPL-SCNC: 4.6 MMOL/L (ref 3.5–5.1)
RBC # BLD AUTO: 4.39 M/UL (ref 4–5.4)
SAMPLE: ABNORMAL
SAMPLE: ABNORMAL
SITE: ABNORMAL
SITE: ABNORMAL
SODIUM SERPL-SCNC: 134 MMOL/L (ref 136–145)
SODIUM SERPL-SCNC: 134 MMOL/L (ref 136–145)
SODIUM SERPL-SCNC: 135 MMOL/L (ref 136–145)
SODIUM SERPL-SCNC: 136 MMOL/L (ref 136–145)
SP02: 98
SPONT RATE: 14
VANCOMYCIN SERPL-MCNC: 28.2 UG/ML
VT: 420
WBC # BLD AUTO: 16.06 K/UL (ref 3.9–12.7)

## 2024-01-31 PROCEDURE — 0BH17EZ INSERTION OF ENDOTRACHEAL AIRWAY INTO TRACHEA, VIA NATURAL OR ARTIFICIAL OPENING: ICD-10-PCS | Performed by: STUDENT IN AN ORGANIZED HEALTH CARE EDUCATION/TRAINING PROGRAM

## 2024-01-31 PROCEDURE — 88365 INSITU HYBRIDIZATION (FISH): CPT | Mod: 26,,, | Performed by: PATHOLOGY

## 2024-01-31 PROCEDURE — C1752 CATH,HEMODIALYSIS,SHORT-TERM: HCPCS | Performed by: SURGERY

## 2024-01-31 PROCEDURE — 25000003 PHARM REV CODE 250: Performed by: HOSPITALIST

## 2024-01-31 PROCEDURE — 80100014 HC HEMODIALYSIS 1:1

## 2024-01-31 PROCEDURE — 25000003 PHARM REV CODE 250: Performed by: NURSE ANESTHETIST, CERTIFIED REGISTERED

## 2024-01-31 PROCEDURE — 63600175 PHARM REV CODE 636 W HCPCS: Performed by: HOSPITALIST

## 2024-01-31 PROCEDURE — 36556 INSERT NON-TUNNEL CV CATH: CPT | Mod: 59,RT,, | Performed by: SURGERY

## 2024-01-31 PROCEDURE — 85007 BL SMEAR W/DIFF WBC COUNT: CPT | Performed by: STUDENT IN AN ORGANIZED HEALTH CARE EDUCATION/TRAINING PROGRAM

## 2024-01-31 PROCEDURE — 88342 IMHCHEM/IMCYTCHM 1ST ANTB: CPT | Mod: 26,,, | Performed by: PATHOLOGY

## 2024-01-31 PROCEDURE — 88307 TISSUE EXAM BY PATHOLOGIST: CPT | Performed by: PATHOLOGY

## 2024-01-31 PROCEDURE — 88365 INSITU HYBRIDIZATION (FISH): CPT | Performed by: PATHOLOGY

## 2024-01-31 PROCEDURE — 11046 DBRDMT MUSC&/FSCA EA ADDL: CPT | Mod: ,,, | Performed by: SURGERY

## 2024-01-31 PROCEDURE — 63600175 PHARM REV CODE 636 W HCPCS: Performed by: NURSE ANESTHETIST, CERTIFIED REGISTERED

## 2024-01-31 PROCEDURE — 36000706: Performed by: SURGERY

## 2024-01-31 PROCEDURE — 99900035 HC TECH TIME PER 15 MIN (STAT)

## 2024-01-31 PROCEDURE — 88341 IMHCHEM/IMCYTCHM EA ADD ANTB: CPT | Mod: 59 | Performed by: PATHOLOGY

## 2024-01-31 PROCEDURE — 63600175 PHARM REV CODE 636 W HCPCS: Performed by: INTERNAL MEDICINE

## 2024-01-31 PROCEDURE — 36000707: Performed by: SURGERY

## 2024-01-31 PROCEDURE — 99223 1ST HOSP IP/OBS HIGH 75: CPT | Mod: ,,, | Performed by: INTERNAL MEDICINE

## 2024-01-31 PROCEDURE — 94660 CPAP INITIATION&MGMT: CPT | Mod: XB

## 2024-01-31 PROCEDURE — 63600175 PHARM REV CODE 636 W HCPCS: Performed by: STUDENT IN AN ORGANIZED HEALTH CARE EDUCATION/TRAINING PROGRAM

## 2024-01-31 PROCEDURE — 36620 INSERTION CATHETER ARTERY: CPT | Mod: 59,,, | Performed by: ANESTHESIOLOGY

## 2024-01-31 PROCEDURE — 86706 HEP B SURFACE ANTIBODY: CPT | Performed by: INTERNAL MEDICINE

## 2024-01-31 PROCEDURE — 86704 HEP B CORE ANTIBODY TOTAL: CPT | Performed by: INTERNAL MEDICINE

## 2024-01-31 PROCEDURE — 85027 COMPLETE CBC AUTOMATED: CPT | Performed by: STUDENT IN AN ORGANIZED HEALTH CARE EDUCATION/TRAINING PROGRAM

## 2024-01-31 PROCEDURE — 86709 HEPATITIS A IGM ANTIBODY: CPT | Performed by: INTERNAL MEDICINE

## 2024-01-31 PROCEDURE — 36415 COLL VENOUS BLD VENIPUNCTURE: CPT | Performed by: STUDENT IN AN ORGANIZED HEALTH CARE EDUCATION/TRAINING PROGRAM

## 2024-01-31 PROCEDURE — 80048 BASIC METABOLIC PNL TOTAL CA: CPT | Mod: 91 | Performed by: STUDENT IN AN ORGANIZED HEALTH CARE EDUCATION/TRAINING PROGRAM

## 2024-01-31 PROCEDURE — 88341 IMHCHEM/IMCYTCHM EA ADD ANTB: CPT | Mod: 26,,, | Performed by: PATHOLOGY

## 2024-01-31 PROCEDURE — D9220A PRA ANESTHESIA: Mod: CRNA,,, | Performed by: NURSE ANESTHETIST, CERTIFIED REGISTERED

## 2024-01-31 PROCEDURE — 99900031 HC PATIENT EDUCATION (STAT)

## 2024-01-31 PROCEDURE — 99900026 HC AIRWAY MAINTENANCE (STAT)

## 2024-01-31 PROCEDURE — D9220A PRA ANESTHESIA: Mod: ANES,,, | Performed by: ANESTHESIOLOGY

## 2024-01-31 PROCEDURE — 63600175 PHARM REV CODE 636 W HCPCS: Performed by: SURGERY

## 2024-01-31 PROCEDURE — 94761 N-INVAS EAR/PLS OXIMETRY MLT: CPT | Mod: XB

## 2024-01-31 PROCEDURE — 88307 TISSUE EXAM BY PATHOLOGIST: CPT | Mod: 26,,, | Performed by: PATHOLOGY

## 2024-01-31 PROCEDURE — 84100 ASSAY OF PHOSPHORUS: CPT | Performed by: STUDENT IN AN ORGANIZED HEALTH CARE EDUCATION/TRAINING PROGRAM

## 2024-01-31 PROCEDURE — 37000008 HC ANESTHESIA 1ST 15 MINUTES: Performed by: SURGERY

## 2024-01-31 PROCEDURE — 94760 N-INVAS EAR/PLS OXIMETRY 1: CPT | Mod: XB

## 2024-01-31 PROCEDURE — 88342 IMHCHEM/IMCYTCHM 1ST ANTB: CPT | Mod: 91 | Performed by: PATHOLOGY

## 2024-01-31 PROCEDURE — 80202 ASSAY OF VANCOMYCIN: CPT | Performed by: HOSPITALIST

## 2024-01-31 PROCEDURE — 99291 CRITICAL CARE FIRST HOUR: CPT | Mod: ,,, | Performed by: INTERNAL MEDICINE

## 2024-01-31 PROCEDURE — 99233 SBSQ HOSP IP/OBS HIGH 50: CPT | Mod: 57,,, | Performed by: SURGERY

## 2024-01-31 PROCEDURE — 94002 VENT MGMT INPAT INIT DAY: CPT

## 2024-01-31 PROCEDURE — 25000003 PHARM REV CODE 250: Performed by: STUDENT IN AN ORGANIZED HEALTH CARE EDUCATION/TRAINING PROGRAM

## 2024-01-31 PROCEDURE — 82803 BLOOD GASES ANY COMBINATION: CPT

## 2024-01-31 PROCEDURE — 5A1D70Z PERFORMANCE OF URINARY FILTRATION, INTERMITTENT, LESS THAN 6 HOURS PER DAY: ICD-10-PCS | Performed by: STUDENT IN AN ORGANIZED HEALTH CARE EDUCATION/TRAINING PROGRAM

## 2024-01-31 PROCEDURE — 11043 DBRDMT MUSC&/FSCA 1ST 20/<: CPT | Mod: ,,, | Performed by: SURGERY

## 2024-01-31 PROCEDURE — 0JB70ZZ EXCISION OF BACK SUBCUTANEOUS TISSUE AND FASCIA, OPEN APPROACH: ICD-10-PCS | Performed by: SURGERY

## 2024-01-31 PROCEDURE — 36415 COLL VENOUS BLD VENIPUNCTURE: CPT | Mod: XB | Performed by: HOSPITALIST

## 2024-01-31 PROCEDURE — 25000003 PHARM REV CODE 250: Performed by: INTERNAL MEDICINE

## 2024-01-31 PROCEDURE — 37799 UNLISTED PX VASCULAR SURGERY: CPT

## 2024-01-31 PROCEDURE — 80048 BASIC METABOLIC PNL TOTAL CA: CPT | Mod: 91 | Performed by: HOSPITALIST

## 2024-01-31 PROCEDURE — 37000009 HC ANESTHESIA EA ADD 15 MINS: Performed by: SURGERY

## 2024-01-31 PROCEDURE — 87340 HEPATITIS B SURFACE AG IA: CPT | Performed by: INTERNAL MEDICINE

## 2024-01-31 PROCEDURE — 83605 ASSAY OF LACTIC ACID: CPT | Performed by: STUDENT IN AN ORGANIZED HEALTH CARE EDUCATION/TRAINING PROGRAM

## 2024-01-31 PROCEDURE — 5A1955Z RESPIRATORY VENTILATION, GREATER THAN 96 CONSECUTIVE HOURS: ICD-10-PCS | Performed by: STUDENT IN AN ORGANIZED HEALTH CARE EDUCATION/TRAINING PROGRAM

## 2024-01-31 PROCEDURE — 27000221 HC OXYGEN, UP TO 24 HOURS

## 2024-01-31 PROCEDURE — 88342 IMHCHEM/IMCYTCHM 1ST ANTB: CPT | Performed by: PATHOLOGY

## 2024-01-31 PROCEDURE — 80048 BASIC METABOLIC PNL TOTAL CA: CPT | Performed by: STUDENT IN AN ORGANIZED HEALTH CARE EDUCATION/TRAINING PROGRAM

## 2024-01-31 PROCEDURE — 20000000 HC ICU ROOM

## 2024-01-31 DEVICE — TRAY CATH PWR TRIALYSIS 15CM: Type: IMPLANTABLE DEVICE | Site: NECK | Status: FUNCTIONAL

## 2024-01-31 RX ORDER — FENTANYL CITRATE-0.9 % NACL/PF 10 MCG/ML
0-200 PLASTIC BAG, INJECTION (ML) INTRAVENOUS CONTINUOUS
Status: DISCONTINUED | OUTPATIENT
Start: 2024-01-31 | End: 2024-02-02

## 2024-01-31 RX ORDER — ROCURONIUM BROMIDE 10 MG/ML
INJECTION, SOLUTION INTRAVENOUS
Status: DISCONTINUED | OUTPATIENT
Start: 2024-01-31 | End: 2024-01-31

## 2024-01-31 RX ORDER — SODIUM CHLORIDE 9 MG/ML
INJECTION, SOLUTION INTRAVENOUS
Status: DISCONTINUED | OUTPATIENT
Start: 2024-01-31 | End: 2024-02-05

## 2024-01-31 RX ORDER — PROPOFOL 10 MG/ML
VIAL (ML) INTRAVENOUS
Status: DISCONTINUED | OUTPATIENT
Start: 2024-01-31 | End: 2024-01-31

## 2024-01-31 RX ORDER — SODIUM CHLORIDE, SODIUM LACTATE, POTASSIUM CHLORIDE, CALCIUM CHLORIDE 600; 310; 30; 20 MG/100ML; MG/100ML; MG/100ML; MG/100ML
INJECTION, SOLUTION INTRAVENOUS CONTINUOUS
Status: DISCONTINUED | OUTPATIENT
Start: 2024-01-31 | End: 2024-01-31

## 2024-01-31 RX ORDER — SODIUM CHLORIDE 9 MG/ML
INJECTION, SOLUTION INTRAVENOUS
Status: DISCONTINUED | OUTPATIENT
Start: 2024-01-31 | End: 2024-02-07

## 2024-01-31 RX ORDER — LIDOCAINE HYDROCHLORIDE 20 MG/ML
INJECTION INTRAVENOUS
Status: DISCONTINUED | OUTPATIENT
Start: 2024-01-31 | End: 2024-01-31

## 2024-01-31 RX ORDER — SODIUM CHLORIDE 9 MG/ML
INJECTION, SOLUTION INTRAVENOUS ONCE
Status: DISCONTINUED | OUTPATIENT
Start: 2024-01-31 | End: 2024-02-05

## 2024-01-31 RX ORDER — HEPARIN SODIUM 5000 [USP'U]/ML
5000 INJECTION, SOLUTION INTRAVENOUS; SUBCUTANEOUS EVERY 8 HOURS
Status: DISCONTINUED | OUTPATIENT
Start: 2024-01-31 | End: 2024-02-02

## 2024-01-31 RX ORDER — FENTANYL CITRATE 50 UG/ML
INJECTION, SOLUTION INTRAMUSCULAR; INTRAVENOUS
Status: DISCONTINUED | OUTPATIENT
Start: 2024-01-31 | End: 2024-01-31

## 2024-01-31 RX ORDER — CHLORHEXIDINE GLUCONATE ORAL RINSE 1.2 MG/ML
15 SOLUTION DENTAL 2 TIMES DAILY
Status: DISCONTINUED | OUTPATIENT
Start: 2024-01-31 | End: 2024-02-05

## 2024-01-31 RX ORDER — FUROSEMIDE 10 MG/ML
80 INJECTION INTRAMUSCULAR; INTRAVENOUS ONCE
Status: DISCONTINUED | OUTPATIENT
Start: 2024-01-31 | End: 2024-01-31

## 2024-01-31 RX ORDER — ONDANSETRON HYDROCHLORIDE 2 MG/ML
INJECTION, SOLUTION INTRAVENOUS
Status: DISCONTINUED | OUTPATIENT
Start: 2024-01-31 | End: 2024-01-31

## 2024-01-31 RX ORDER — FUROSEMIDE 10 MG/ML
80 INJECTION INTRAMUSCULAR; INTRAVENOUS ONCE
Status: COMPLETED | OUTPATIENT
Start: 2024-01-31 | End: 2024-01-31

## 2024-01-31 RX ORDER — PROPOFOL 10 MG/ML
0-50 INJECTION, EMULSION INTRAVENOUS CONTINUOUS
Status: DISCONTINUED | OUTPATIENT
Start: 2024-01-31 | End: 2024-02-02

## 2024-01-31 RX ORDER — SUCCINYLCHOLINE CHLORIDE 20 MG/ML
INJECTION INTRAMUSCULAR; INTRAVENOUS
Status: DISCONTINUED | OUTPATIENT
Start: 2024-01-31 | End: 2024-01-31

## 2024-01-31 RX ADMIN — FENTANYL CITRATE 100 MCG: 50 INJECTION, SOLUTION INTRAMUSCULAR; INTRAVENOUS at 10:01

## 2024-01-31 RX ADMIN — MEROPENEM 1 G: 1 INJECTION, POWDER, FOR SOLUTION INTRAVENOUS at 08:01

## 2024-01-31 RX ADMIN — PROPOFOL 25 MCG/KG/MIN: 10 INJECTION, EMULSION INTRAVENOUS at 09:01

## 2024-01-31 RX ADMIN — FUROSEMIDE 80 MG: 10 INJECTION, SOLUTION INTRAVENOUS at 02:01

## 2024-01-31 RX ADMIN — FENTANYL CITRATE 75 MCG: 50 INJECTION, SOLUTION INTRAMUSCULAR; INTRAVENOUS at 11:01

## 2024-01-31 RX ADMIN — ACETAMINOPHEN 650 MG: 650 SUPPOSITORY RECTAL at 02:01

## 2024-01-31 RX ADMIN — MUPIROCIN: 20 OINTMENT TOPICAL at 10:01

## 2024-01-31 RX ADMIN — ROCURONIUM BROMIDE 20 MG: 10 INJECTION, SOLUTION INTRAVENOUS at 12:01

## 2024-01-31 RX ADMIN — SODIUM CHLORIDE, POTASSIUM CHLORIDE, SODIUM LACTATE AND CALCIUM CHLORIDE: 600; 310; 30; 20 INJECTION, SOLUTION INTRAVENOUS at 03:01

## 2024-01-31 RX ADMIN — SODIUM CHLORIDE, SODIUM LACTATE, POTASSIUM CHLORIDE, AND CALCIUM CHLORIDE: .6; .31; .03; .02 INJECTION, SOLUTION INTRAVENOUS at 10:01

## 2024-01-31 RX ADMIN — ROCURONIUM BROMIDE 50 MG: 10 INJECTION, SOLUTION INTRAVENOUS at 10:01

## 2024-01-31 RX ADMIN — HEPARIN SODIUM 5000 UNITS: 5000 INJECTION INTRAVENOUS; SUBCUTANEOUS at 09:01

## 2024-01-31 RX ADMIN — SUCCINYLCHOLINE CHLORIDE 200 MG: 20 INJECTION, SOLUTION INTRAMUSCULAR; INTRAVENOUS at 10:01

## 2024-01-31 RX ADMIN — CLINDAMYCIN PHOSPHATE 900 MG: 900 INJECTION, SOLUTION INTRAVENOUS at 01:01

## 2024-01-31 RX ADMIN — PROPOFOL 25 MCG/KG/MIN: 10 INJECTION, EMULSION INTRAVENOUS at 05:01

## 2024-01-31 RX ADMIN — Medication 25 MCG/HR: at 01:01

## 2024-01-31 RX ADMIN — SODIUM BICARBONATE: 84 INJECTION, SOLUTION INTRAVENOUS at 09:01

## 2024-01-31 RX ADMIN — FENTANYL CITRATE 25 MCG: 50 INJECTION, SOLUTION INTRAMUSCULAR; INTRAVENOUS at 12:01

## 2024-01-31 RX ADMIN — CHLORHEXIDINE GLUCONATE 0.12% ORAL RINSE 15 ML: 1.2 LIQUID ORAL at 09:01

## 2024-01-31 RX ADMIN — MUPIROCIN: 20 OINTMENT TOPICAL at 08:01

## 2024-01-31 RX ADMIN — ONDANSETRON 4 MG: 2 INJECTION, SOLUTION INTRAMUSCULAR; INTRAVENOUS at 12:01

## 2024-01-31 RX ADMIN — PROPOFOL 5 MCG/KG/MIN: 10 INJECTION, EMULSION INTRAVENOUS at 01:01

## 2024-01-31 RX ADMIN — CEFEPIME 1 G: 1 INJECTION, POWDER, FOR SOLUTION INTRAMUSCULAR; INTRAVENOUS at 01:01

## 2024-01-31 RX ADMIN — INSULIN DETEMIR 10 UNITS: 100 INJECTION, SOLUTION SUBCUTANEOUS at 09:01

## 2024-01-31 RX ADMIN — FAMOTIDINE 20 MG: 10 INJECTION, SOLUTION INTRAVENOUS at 08:01

## 2024-01-31 RX ADMIN — CLINDAMYCIN PHOSPHATE 900 MG: 900 INJECTION, SOLUTION INTRAVENOUS at 08:01

## 2024-01-31 RX ADMIN — LIDOCAINE HYDROCHLORIDE 100 MG: 20 INJECTION, SOLUTION INTRAVENOUS at 10:01

## 2024-01-31 RX ADMIN — CLINDAMYCIN PHOSPHATE 900 MG: 900 INJECTION, SOLUTION INTRAVENOUS at 02:01

## 2024-01-31 RX ADMIN — PROPOFOL 200 MG: 10 INJECTION, EMULSION INTRAVENOUS at 10:01

## 2024-01-31 RX ADMIN — INSULIN ASPART 1 UNITS: 100 INJECTION, SOLUTION INTRAVENOUS; SUBCUTANEOUS at 09:01

## 2024-01-31 NOTE — HPI
Sarah Saravia is a 53 year old female with history of morbid obesity, who initially presented with vomiting x 4 days.  She was found to have a right groin wound.  She had a leukocytosis (21.6), ALVARADO (Cr 3.8), and was in DKA.  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  Patient was taken to the OR for debridement.  She was fluid resuscitated and started on vanc/zosyn/clinda.  Also treated for DKA with insulin infusion.  Admitted to the ICU for further care.     She returned to the OR on 1/31/2024 and returned intubated.  ICU was consulted for management of ventilator and critical illness.  She now has a right sided trialysis in place from the OR.  Nephrology planning on starting HD for progressive renal dysfunction.  She has since been transitioned to SQ insulin.

## 2024-01-31 NOTE — CLINICAL REVIEW
IP Sepsis Screen (most recent)       Sepsis Screen (IP) - 01/31/24 9518       Is the patient's history or complaint suggestive of a possible infection? Yes  -CB    Are there at least two of the following signs and symptoms present? Yes  -CB    Sepsis signs/symptoms - Tachycardia Tachycardia     >90  -CB    Sepsis signs/symptoms - Tachypnea Tachypnea     >20  -CB    Sepsis signs/symptoms - WBC WBC < 4,000 or WBC > 12,000  -CB    Sepsis signs/symptoms - Altered Mental Status Altered Mental Status  -CB    Are any of the following organ dysfunction criteria present and not considered to be due to a chronic condition? Yes  -CB    Organ Dysfunction Criteria Creatinine > 2.0  -CB    Organ Dysfunction Criteria Lactate > 2.0  -CB    Organ Dysfunction Criteria - Resp Comp Respiratory Compromise: Requiring > 5L NC  -CB    Initiate Sepsis Protocol No  -CB    Reason sepsis not considered Pt. receiving appropriate management  -CB              User Key  (r) = Recorded By, (t) = Taken By, (c) = Cosigned By      Initials Name    CB Carmen Delgado RN

## 2024-01-31 NOTE — PROGRESS NOTES
[facility] Medicine  Progress Note    Patient Name: Sarah Saravia  MRN: [id number]  Patient Class: IP- Inpatient   Admission Date: 2024  Length of Stay: 2 days  Attending Physician: Carlos Acuna, *  Primary Care Provider: Patricia [facility]        Subjective:     Principal Problem:Ros's gangrene        HPI:  This is a 53-year-old female with a past medical history of morbid obesity who presents with vomiting.     Patient presents with nausea, vomiting that started 4 days prior to presentation.  She also reports a wound in her right groin area without associated drainage.    In the ED, the patient was tachycardic (up to 120s) and hypertensive.  Labs were remarkable for leukocytosis (21.6), elevated creatinine (3.8-unknown baseline), elevated lactic acid (5.6), hyponatremia (130), elevated CRP (530.2) and were suggestive of DKA (B, A, CO2: 17, BHB: 3.2, PH: 7.293).  CT abdomen and pelvis showed extensive soft tissue air throughout the right groin and inguinal region and extending to involve the right lower quadrant anterior abdominal wall with extensive soft tissue air also seen involving the proximal medial aspect of the right thigh, concerning for gas-forming infection.  General surgery was consulted and the patient was taken to the OR for debridement.  Patient was given 2.7 L of NS, vancomycin, Zosyn, clindamycin, morphine 4 mg IV, Zofran 4 mg IV and was started on an insulin drip.  She was admitted for further management.    Overview/Hospital Course:  P53-year-old female with a past medical history of morbid obesity who presents with vomiting.DKA and right groin  ros gangrene,  Per CT,  Extensive soft tissue air throughout the right groin and inguinal region and extending to involve the right lower quadrant anterior abdominal wall with extensive soft tissue air also seen involving the proximal medial aspect of the right thigh.   This is most concerning for gas-forming infection.   S/P urgent debridement by surgery,on broad spectrum IV Abx,cultures are pending.will have another debridement today,culture growing proteus,consulted ID.  Has elevated Cr. No prior record for comparison,on IVF and nephrology is consulted.worsening likely ATN,will be started on HD today.  Replaced phosphorous,  DKA with insulin gtt is improving.on SQ insulin.      History reviewed. No pertinent past medical history.    Past Surgical History:   Procedure Laterality Date    INCISION AND DRAINAGE OF PERIRECTAL REGION N/A 1/29/2024    Procedure: INCISION AND DRAINAGE, PERIRECTAL REGION;  Surgeon: Axel Ramsay MD;  Location: [facility] OR;  Service: General;  Laterality: N/A;       Review of patient's allergies indicates:  No Known Allergies    No current facility-administered medications on file prior to encounter.     No current outpatient medications on file prior to encounter.     Family History    None       Tobacco Use    Smoking status: Not on file    Smokeless tobacco: Not on file   Substance and Sexual Activity    Alcohol use: Not on file    Drug use: Not on file    Sexual activity: Not on file     Review of Systems   Unable to perform ROS: Mental status change   (Post surgery, likely related to sedation)  Objective:     Vital Signs (Most Recent):  Temp: (!) 100.4 °F (38 °C) (01/31/24 0715)  Pulse: 85 (01/31/24 1000)  Resp: (!) 28 (01/31/24 1000)  BP: 139/76 (01/31/24 1000)  SpO2: 99 % (01/31/24 1000) Vital Signs (24h Range):  Temp:  [99.2 °F (37.3 °C)-103.1 °F (39.5 °C)] 100.4 °F (38 °C)  Pulse:  [] 85  Resp:  [24-33] 28  SpO2:  [97 %-100 %] 99 %  BP: ()/() 139/76     Weight: (!) 150.1 kg (330 lb 14.6 oz)  Body mass index is 53.41 kg/m².     Physical Exam  Vitals and nursing note reviewed.   Constitutional:       General: She is not in acute distress.     Appearance: Normal appearance. She is obese. She is not ill-appearing.   HENT:       Head: Normocephalic and atraumatic.      Nose: Nose normal.      Mouth/Throat:      Mouth: Mucous membranes are moist.   Eyes:      Extraocular Movements: Extraocular movements intact.   Cardiovascular:      Rate and Rhythm: Tachycardia present.      Pulses: Normal pulses.      Heart sounds: No murmur heard.  Pulmonary:      Effort: Pulmonary effort is normal. No respiratory distress.   Abdominal:      General: Abdomen is flat.      Palpations: Abdomen is soft.      Tenderness: There is no abdominal tenderness.   Musculoskeletal:      Right lower leg: No edema.      Left lower leg: No edema.      Comments: Right groin wound, packed    Skin:     General: Skin is warm.      Capillary Refill: Capillary refill takes less than 2 seconds.   Neurological:      Mental Status: She is alert.      Comments: A&Ox2 (post anesthesia)                 Significant Labs: All pertinent labs within the past 24 hours have been reviewed.    Significant Imaging: I have reviewed all pertinent imaging results/findings within the past 24 hours.    Assessment/Plan:      * Ayaz's gangrene  CT abdomen and pelvis showed extensive soft tissue air throughout the right groin and inguinal region and extending to involve the right lower quadrant anterior abdominal wall with extensive soft tissue air also seen involving the proximal medial aspect of the right thigh, concerning for gas-forming infection.    S/p OR for debridement on 1/29      Continue vancomycin, merem, clindamycin  Culture growing proteus,consulted ID.  Will have another debridement today.        Hyponatremia  Patient has hyponatremia which is uncontrolled,We will aim to correct the sodium by 4-6mEq in 24 hours. We will monitor sodium Every 4 hours. The hyponatremia is due to Dehydration/hypovolemia. We will obtain the following studies: Urine sodium, urine osmolality, serum osmolality. We will treat the hyponatremia with IV fluids as follows: per DKA protocol. The patient's  sodium results have been reviewed and are listed below.  Recent Labs   Lab 01/29/24  1929   *       ALVARADO (acute kidney injury)  Patient with acute kidney injury/acute renal failure likely due to pre-renal azotemia due to IVVD ALVARADO is currently stable. Baseline creatinine  unknown  - Labs reviewed- Renal function/electrolytes with Estimated Creatinine Clearance: 18.2 mL/min (A) (based on SCr of 5.4 mg/dL (H)). according to latest data. Monitor urine output and serial BMP and adjust therapy as needed. Avoid nephrotoxins and renally dose meds for GFR listed above.    Has elevated Cr. No prior record for comparison,on IVF and nephrology is consulted.worsening likely ATN,will be started on HD today.      Severe sepsis  This patient does have evidence of infective focus  My overall impression is sepsis.  Source: Skin and Soft Tissue (location groin)  Antibiotics given-   Antibiotics (72h ago, onward)      Start     Stop Route Frequency Ordered    01/30/24 0530  clindamycin injection 900 mg         -- IM Every 8 hours (non-standard times) 01/30/24 0017    01/30/24 0330  piperacillin-tazobactam (ZOSYN) 4.5 g in dextrose 5 % in water (D5W) 100 mL IVPB (MB+)         -- IV Every 8 hours (non-standard times) 01/30/24 0017    01/29/24 2000  vancomycin (VANCOCIN) 2,500 mg in dextrose 5 % (D5W) 500 mL IVPB         -- IV Once 01/29/24 1931    01/29/24 1944  vancomycin - pharmacy to dose  (vancomycin IVPB (PEDS and ADULTS))        See Hyperspace for full Linked Orders Report.    -- IV pharmacy to manage frequency 01/29/24 1846          Latest lactate reviewed-  Recent Labs   Lab 01/29/24  1950   POCLAC 5.64*     Organ dysfunction indicated by Acute kidney injury    Fluid challenge Ideal Body Weight- The patient's ideal body weight is Ideal body weight: 59.3 kg (130 lb 11.7 oz) which will be used to calculate fluid bolus of 30 ml/kg for treatment of septic shock.      Post- resuscitation assessment Yes Perfusion exam was  performed within 6 hours of septic shock presentation after bolus shows Adequate tissue perfusion assessed by non-invasive monitoring       Will Not start Pressors- Levophed for MAP of 65  Source control achieved by: Abx    Morbid obesity  Body mass index is 56.49 kg/m². Morbid obesity complicates all aspects of disease management from diagnostic modalities to treatment. Weight loss encouraged and health benefits explained to patient.         Diabetic acidosis without coma  DKA protocol. IV insulin, IV fluids  Monitor BMPs  On SQ insulin at this time.      VTE Risk Mitigation (From admission, onward)           Ordered     Place sequential compression device  Until discontinued         01/30/24 0016     IP VTE HIGH RISK PATIENT  Once         01/30/24 0016     Place NATE hose  Until discontinued         01/30/24 0016     Place sequential compression device  Until discontinued         01/30/24 0016                    Discharge Planning   ZEKE:      Code Status: Full Code   Is the patient medically ready for discharge?:     Reason for patient still in hospital (select all that apply): Patient trending condition               Critical care time spent on the evaluation and treatment of severe organ dysfunction, review of pertinent labs and imaging studies, discussions with consulting providers and discussions with patient/family:  over 45  minutes.      Carlos Acuna MD  Department of Hospital Medicine   [facility] [place] [facility] Int[facility]

## 2024-01-31 NOTE — SUBJECTIVE & OBJECTIVE
History reviewed. No pertinent past medical history.    Past Surgical History:   Procedure Laterality Date    INCISION AND DRAINAGE OF PERIRECTAL REGION N/A 1/29/2024    Procedure: INCISION AND DRAINAGE, PERIRECTAL REGION;  Surgeon: Axel Ramsay MD;  Location: [facility];  Service: General;  Laterality: N/A;       Review of patient's allergies indicates:  No Known Allergies    No current facility-administered medications on file prior to encounter.     No current outpatient medications on file prior to encounter.     Family History    None       Tobacco Use    Smoking status: Not on file    Smokeless tobacco: Not on file   Substance and Sexual Activity    Alcohol use: Not on file    Drug use: Not on file    Sexual activity: Not on file     Review of Systems   Unable to perform ROS: Mental status change   (Post surgery, likely related to sedation)  Objective:     Vital Signs (Most Recent):  Temp: (!) 100.4 °F (38 °C) (01/31/24 0715)  Pulse: 85 (01/31/24 1000)  Resp: (!) 28 (01/31/24 1000)  BP: 139/76 (01/31/24 1000)  SpO2: 99 % (01/31/24 1000) Vital Signs (24h Range):  Temp:  [99.2 °F (37.3 °C)-103.1 °F (39.5 °C)] 100.4 °F (38 °C)  Pulse:  [] 85  Resp:  [24-33] 28  SpO2:  [97 %-100 %] 99 %  BP: ()/() 139/76     Weight: (!) 150.1 kg (330 lb 14.6 oz)  Body mass index is 53.41 kg/m².     Physical Exam  Vitals and nursing note reviewed.   Constitutional:       General: She is not in acute distress.     Appearance: Normal appearance. She is obese. She is not ill-appearing.   HENT:      Head: Normocephalic and atraumatic.      Nose: Nose normal.      Mouth/Throat:      Mouth: Mucous membranes are moist.   Eyes:      Extraocular Movements: Extraocular movements intact.   Cardiovascular:      Rate and Rhythm: Tachycardia present.      Pulses: Normal pulses.      Heart sounds: No murmur heard.  Pulmonary:      Effort: Pulmonary effort is normal. No respiratory distress.   Abdominal:      General: Abdomen  is flat.      Palpations: Abdomen is soft.      Tenderness: There is no abdominal tenderness.   Musculoskeletal:      Right lower leg: No edema.      Left lower leg: No edema.      Comments: Right groin wound, packed    Skin:     General: Skin is warm.      Capillary Refill: Capillary refill takes less than 2 seconds.   Neurological:      Mental Status: She is alert.      Comments: A&Ox2 (post anesthesia)                 Significant Labs: All pertinent labs within the past 24 hours have been reviewed.    Significant Imaging: I have reviewed all pertinent imaging results/findings within the past 24 hours.

## 2024-01-31 NOTE — PROGRESS NOTES
Pharmacist Renal Dose Adjustment Note    Sarah Saravia is a 53 y.o. female being treated with the medication meropenem    Patient Data:    Vital Signs (Most Recent):  Temp: (!) 101.2 °F (38.4 °C) (01/31/24 0400)  Pulse: 90 (01/31/24 0530)  Resp: (!) 29 (01/31/24 0530)  BP: (!) 162/84 (01/31/24 0530)  SpO2: 99 % (01/31/24 0530) Vital Signs (72h Range):  Temp:  [98.1 °F (36.7 °C)-103.1 °F (39.5 °C)]   Pulse:  []   Resp:  [20-39]   BP: ()/()   SpO2:  [90 %-100 %]      Recent Labs   Lab 01/30/24  1633 01/30/24  2043 01/31/24  0100   CREATININE 4.4* 4.6* 4.7*     Serum creatinine: 4.7 mg/dL (H) 01/31/24 0100  Estimated creatinine clearance: 20.9 mL/min (A)    Medication:meropenem dose: 1 gm frequency q8h will be changed to medication:meropenem dose:1 gm frequency:q12h    Pharmacist's Name: Nj Frost  Pharmacist's Extension: [phone]

## 2024-01-31 NOTE — OP NOTE
Date: 1/29/2024    Physician: Surgeon(s) and Role:     * Axel Ramsay MD - Primary  Suzanne Woo MD - Resident Assist     Preoperative Diagnosis: Necrotizing soft tissue infection, diabetic ketoacidosis    Postoperative Diagnosis: Same    Procedure: Incision and drainage, debridement of soft tissues down to and including muscular fascia     Anesthesia: General    Findings: necrotizing soft tissue infection with necrotic tissue requiring debridement down to the level of the fascia and muscle of the right supero-medial and medial inferior buttock; wound packed with betadine soaked kerlix (x4), dry kerlix (x1), gauze and tape.      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 We recommended urgent surgical exploration and debridement. Patient and family agreed and informed consent was obtained.     Procedure:    The patient was identified in preoperative holding and brought back to the operating room. She was placed supine in lithotomy on the operating room table and padded appropriately. Monitors were applied and there was smooth induction of general endotracheal anesthesia. Her lower pubic region and bilateral thighs were prepped and draped in the standard sterile surgical fashion. A timeout was performed and all team members present agreed that this was the correct procedure on the correct patient. We also confirmed administration of appropriate preoperative antibiotics.    The area of induration spanned from patients right superomedial thigh to the level of the buttock. We may an incision sharply and dissected down through the subcutaneous tissue. Necrotic, foul smelling subcutaneous fat was encountered fairly superficially as well as release of purulent output. We bluntly probed this cavity of necrotic, semi-liquified soft tissue and the necrosis was found to extend from the  anterior superomedical thigh to the inferior buttock edge. Our incision ultimately extended over this whole area. We did not resect any necrotic skin at this point. Extensive debridement of necrotic soft tissues and some underlying muscle was performed along this entire area which measured ~ 30 cm in length. We then performed hydrostatic dissection with pulsavac using vashe wound cleanser and 3 liters of normal saline. Following this hemostasis was confirmed and the wound was packed with 4 betadine soaked kerlix tied together, 1 dry kerlix, multiple four by fours and abd pads.      Sterile dressings were applied. The patient was extubated in the operating room and transported to the recovery room in stable condition. All sponge, instrument, and needle counts were reported as correct at the end of the procedure.    Estimated Blood Loss: 200 mL     Complications: None    Drains: None    Specimen: Cultures taken, no specimens

## 2024-01-31 NOTE — ANESTHESIA PROCEDURE NOTES
Arterial    Diagnosis: sepsis    Patient location during procedure: done in OR  Timeout: 1/31/2024 10:24 AM  Procedure end time: 1/31/2024 10:32 AM    Staffing  Authorizing Provider: Steven Magaña MD  Performing Provider: Brett Sanchez CRNA    Staffing  Performed by: Brett Sanchez CRNA  Authorized by: Steven Magaña MD    Anesthesiologist was present at the time of the procedure.    Preanesthetic Checklist  Completed: patient identified, IV checked, site marked, risks and benefits discussed, surgical consent, monitors and equipment checked, pre-op evaluation, timeout performed and anesthesia consent givenArterial  Skin Prep: chlorhexidine gluconate  Local Infiltration: none  Orientation: right  Location: radial    Catheter Size: 20 G  Catheter placement by Ultrasound guidance. Heme positive aspiration all ports.   Vessel Caliber: medium, patent, compressibility normal  Needle advanced into vessel with real time Ultrasound guidance.Insertion Attempts: 3  Assessment  Dressing: secured with tape and tegaderm and secured with tape  Patient: Tolerated poorly

## 2024-01-31 NOTE — ASSESSMENT & PLAN NOTE
"53F with h/o obesity (BMI 53) admitted with N/V and R groin wound found to be in DKA.  CT abdomen and pelvis showed extensive soft tissue air throughout the right groin and inguinal region and extending to involve the right lower quadrant anterior abdominal wall with extensive soft tissue air also seen involving the proximal medial aspect of the right thigh, concerning for gas-forming infection.  s/p OR debridement for nec fascitis. R groin tissue with proteus, susceptibilities pending.  On clindamycin, meropenem. ID consulted for "Abx management     Treating nec fascitis. Appreciate sgy help with source control. Suspect polymicrobial infection.     Recommendations:   - continue meropenem for now, renally dosed.  Consider stopping clindamycin  - f/u cultures  - defer to sgy need for additional debridment.   "

## 2024-01-31 NOTE — ASSESSMENT & PLAN NOTE
This patient does have evidence of infective focus  My overall impression is sepsis.  Source: Skin and Soft Tissue (location groin)  Antibiotics given-   Antibiotics (72h ago, onward)      Start     Stop Route Frequency Ordered    02/01/24 0900  meropenem (MERREM) 1 g in sodium chloride 0.9 % 100 mL IVPB (MB+)         -- IV Daily 01/31/24 1114    01/30/24 1200  clindamycin in D5W 900 mg/50 mL IVPB 900 mg         -- IV Every 6 hours (non-standard times) 01/30/24 0927    01/30/24 0945  mupirocin 2 % ointment         02/04/24 0859 Nasl 2 times daily 01/30/24 0831    01/29/24 1944  vancomycin - pharmacy to dose  (vancomycin IVPB (PEDS and ADULTS))        See Hyperspace for full Linked Orders Report.    -- IV pharmacy to manage frequency 01/29/24 1846          Latest lactate reviewed-  Recent Labs   Lab 01/29/24  1950 01/30/24  0042 01/31/24  0100   LACTATE  --    < > 2.4*   POCLAC 5.64*  --   --     < > = values in this interval not displayed.     Organ dysfunction indicated by Acute kidney injury, Acute respiratory failure, and Encephalopathy    Fluid challenge Not needed - patient is not hypotensive  (already resuscitated on presentation)    Post- resuscitation assessment No - Post resuscitation assessment not needed       Will Not start Pressors- Levophed for MAP of 65  Source control achieved by: surgical resection and antibiotic management.

## 2024-01-31 NOTE — SUBJECTIVE & OBJECTIVE
History reviewed. No pertinent past medical history.    Past Surgical History:   Procedure Laterality Date    INCISION AND DRAINAGE OF PERIRECTAL REGION N/A 1/29/2024    Procedure: INCISION AND DRAINAGE, PERIRECTAL REGION;  Surgeon: Axel Ramsay MD;  Location: [facility];  Service: General;  Laterality: N/A;       Review of patient's allergies indicates:  No Known Allergies    Family History    None       Tobacco Use    Smoking status: Not on file    Smokeless tobacco: Not on file   Substance and Sexual Activity    Alcohol use: Not on file    Drug use: Not on file    Sexual activity: Not on file         Review of Systems   Unable to perform ROS: Acuity of condition     Objective:     Vital Signs (Most Recent):  Temp: 97.7 °F (36.5 °C) (01/31/24 1348)  Pulse: (!) 113 (01/31/24 1348)  Resp: (!) 29 (01/31/24 1358)  BP: (!) 150/76 (01/31/24 1313)  SpO2: 100 % (01/31/24 1348) Vital Signs (24h Range):  Temp:  [97.5 °F (36.4 °C)-103.1 °F (39.5 °C)] 97.7 °F (36.5 °C)  Pulse:  [] 113  Resp:  [22-33] 29  SpO2:  [97 %-100 %] 100 %  BP: ()/() 150/76     Weight: (!) 150.1 kg (330 lb 14.6 oz)  Body mass index is 53.41 kg/m².      Intake/Output Summary (Last 24 hours) at 1/31/2024 1409  Last data filed at 1/31/2024 1257  Gross per 24 hour   Intake 3995.92 ml   Output 165 ml   Net 3830.92 ml        Physical Exam  Constitutional:       General: She is not in acute distress.     Appearance: She is obese. She is ill-appearing. She is not toxic-appearing.   HENT:      Head: Normocephalic and atraumatic.      Nose: Nose normal.      Mouth/Throat:      Mouth: Mucous membranes are moist.   Eyes:      Extraocular Movements: Extraocular movements intact.      Pupils: Pupils are equal, round, and reactive to light.   Cardiovascular:      Rate and Rhythm: Regular rhythm. Tachycardia present.   Pulmonary:      Effort: Pulmonary effort is normal.      Comments: Mild rales  Abdominal:      General: Abdomen is flat. There is  no distension.      Palpations: Abdomen is soft.      Tenderness: There is no abdominal tenderness.   Musculoskeletal:         General: Normal range of motion.      Cervical back: Normal range of motion.      Right lower leg: No edema.      Left lower leg: No edema.   Skin:     General: Skin is warm and dry.      Comments: Wound to right groin to buttock   Neurological:      Mental Status: She is disoriented.      Comments: Sedated.          Vents:  Vent Mode: PRVC (01/31/24 1327)  Set Rate: 18 BPM (01/31/24 1348)  Vt Set: 420 mL (01/31/24 1348)  PEEP/CPAP: 5 cmH20 (01/31/24 1348)  Oxygen Concentration (%): 30 (01/31/24 1348)  Peak Airway Pressure: 24 cmH20 (01/31/24 1348)  Total Ve: 11.4 L/m (01/31/24 1348)  F/VT Ratio<105 (RSBI): (!) 59.91 (01/31/24 1348)    Lines/Drains/Airways       Central Venous Catheter Line  Duration             Trialysis (Dialysis) Catheter 01/31/24 1051 right internal jugular <1 day              Drain  Duration                  Urethral Catheter 01/29/24 2315 1 day              Airway  Duration                  Airway - Non-Surgical 01/31/24 1020 <1 day              Arterial Line  Duration             Arterial Line 01/31/24 1025 Right Radial <1 day              Peripheral Intravenous Line  Duration                  Peripheral IV - Single Lumen 01/29/24 1922 20 G Right Antecubital 1 day         Peripheral IV - Single Lumen 01/29/24 2116 Left Antecubital 1 day         Peripheral IV - Single Lumen 01/30/24 0922 20 G Left;Posterior Hand 1 day                    Significant Labs:    CBC/Anemia Profile:  Recent Labs   Lab 01/30/24  0042 01/30/24  0447 01/31/24  0519   WBC 17.67* 14.73* 16.06*   HGB 13.0 12.0 11.3*   HCT 42.1 37.9 35.0*    186 197   MCV 84 84 80*   RDW 14.8* 14.6* 14.5        Chemistries:  Recent Labs   Lab 01/29/24  1929 01/30/24  0042 01/30/24  0447 01/30/24  0834 01/31/24  0100 01/31/24  0519 01/31/24  0520 01/31/24  0827   * 134* 134*   < > 134* 136  --  135*   K  4.2 3.9 3.8   < > 3.8 3.9  --  4.6   CL 94* 103 103   < > 105 103  --  107   CO2 17* 15* 17*   < > 16* 16*  --  14*   BUN 44* 44* 46*   < > 51* 51*  --  55*   CREATININE 3.8* 3.5* 3.6*   < > 4.7* 5.0*  --  5.4*   CALCIUM 8.9 8.3* 8.3*   < > 7.8* 8.3*  --  7.5*   ALBUMIN 2.6*  --   --   --   --   --   --   --    PROT 8.0  --   --   --   --   --   --   --    BILITOT 0.7  --   --   --   --   --   --   --    ALKPHOS 151*  --   --   --   --   --   --   --    ALT 30  --   --   --   --   --   --   --    AST 40  --   --   --   --   --   --   --    PHOS  --  2.4* 1.9*  --   --   --  3.7  --     < > = values in this interval not displayed.       All pertinent labs within the past 24 hours have been reviewed.    Significant Imaging:   I have reviewed all pertinent imaging results/findings within the past 24 hours.

## 2024-01-31 NOTE — TRANSFER OF CARE
"Anesthesia Transfer of Care Note    Patient: Sarah Saravia    Procedure(s) Performed: Procedure(s) (LRB):  IRRIGATION & DEBRIDEMENT, WOUND DEBRIDEMENT (Right)  INSERTION, CATHETER, TRIPLE LUMEN, HEMODIALYSIS, TEMPORARY (Right)    Patient location: ICU    Anesthesia Type: general    Transport from OR: Transported from OR intubated on 100% O2 by AMBU with adequate controlled ventilation. Upon arrival to PACU/ICU, patient attached to ventilator and auscultated to confirm bilateral breath sounds and adequate TV. Continuous ECG monitoring in transport. Continuous SpO2 monitoring in transport. Continuos invasive BP monitoring in transport    Post pain: adequate analgesia    Post assessment: no apparent anesthetic complications and tolerated procedure well    Post vital signs: stable    Level of consciousness: sedated and unresponsive    Nausea/Vomiting: no nausea/vomiting    Complications: none    Transfer of care protocol was followed      Last vitals: Visit Vitals  BP (!) 150/76 (BP Location: Left arm, Patient Position: Lying)   Pulse 109   Temp 36.9 °C (98.4 °F) (Core ([place]))   Resp (!) 28   Ht 5' 6" (1.676 m)   Wt (!) 150.1 kg (330 lb 14.6 oz)   LMP 01/01/2024 (Approximate)   SpO2 98%   BMI 53.41 kg/m²     "

## 2024-01-31 NOTE — PROGRESS NOTES
Pharmacokinetic Assessment Follow Up: IV Vancomycin    Vancomycin serum concentration assessment(s):    The random level was drawn correctly and can be used to guide therapy at this time. The measurement is above the desired definitive target range of 10 to 20 mcg/mL.    Vancomycin Regimen Plan:    No dose today.  Re-dose when the random level is less than 20 mcg/mL, next level to be drawn at 0300 on 2/1/2024    Drug levels (last 3 results):  Recent Labs   Lab Result Units 01/30/24  1355 01/31/24  0520   Vancomycin, Random ug/mL 30.5 28.2       Pharmacy will continue to follow and monitor vancomycin.    Please contact pharmacy at extension [phone] for questions regarding this assessment.    Thank you for the consult,   Melvin Guillermo Jr       Patient brief summary:  Sarah Saravia is a 53 y.o. female initiated on antimicrobial therapy with IV Vancomycin for treatment of skin & soft tissue infection    Drug Allergies:   Review of patient's allergies indicates:  No Known Allergies    Actual Body Weight:   150.1 kg    Renal Function:   Estimated Creatinine Clearance: 19.6 mL/min (A) (based on SCr of 5 mg/dL (H)).,     Dialysis Method (if applicable):  N/A    CBC (last 72 hours):  Recent Labs   Lab Result Units 01/29/24 1929 01/30/24  0042 01/30/24 0447 01/31/24  0519   WBC K/uL 21.60* 17.67* 14.73* 16.06*   Hemoglobin g/dL 12.0 13.0 12.0 11.3*   Hemoglobin A1C %  --  10.4*  --   --    Hematocrit % 38.5 42.1 37.9 35.0*   Platelets K/uL 217 216 186 197   Gran % % 87.8* 89.2* 86.3* 58.0   Lymph % % 5.6* 7.1* 9.0* 9.0*   Mono % % 4.3 2.8* 3.5* 4.0   Eosinophil % % 0.5 0.1 0.1 1.0   Basophil % % 0.6 0.2 0.3 0.0   Differential Method  Automated Automated Automated Manual       Metabolic Panel (last 72 hours):  Recent Labs   Lab Result Units 01/29/24  1929 01/30/24  0042 01/30/24  0415 01/30/24  0447 01/30/24  0834 01/30/24  0958 01/30/24  1355 01/30/24  1633 01/30/24  2043 01/31/24  0100 01/31/24  0519 01/31/24  0520    Sodium mmol/L 130* 134*  --  134* 136  --  134* 136 136 134* 136  --    Sodium, Urine mmol/L  --   --   --   --   --  <20*  --   --   --   --   --   --    Potassium mmol/L 4.2 3.9  --  3.8 4.1  --  3.9 3.6 3.5 3.8 3.9  --    Chloride mmol/L 94* 103  --  103 109  --  105 105 106 105 103  --    CO2 mmol/L 17* 15*  --  17* 18*  --  17* 20* 16* 16* 16*  --    Glucose mg/dL 824* 571*  --  412* 167*  --  198* 137* 125* 130* 145*  --    Glucose, UA   --   --  4+*  --   --   --   --   --   --   --   --   --    BUN mg/dL 44* 44*  --  46* 47*  --  47* 50* 50* 51* 51*  --    Creatinine mg/dL 3.8* 3.5*  --  3.6* 3.8*  --  3.9* 4.4* 4.6* 4.7* 5.0*  --    Creatinine, Urine mg/dL  --   --   --   --   --  274.8  --   --   --   --   --   --    Albumin g/dL 2.6*  --   --   --   --   --   --   --   --   --   --   --    Total Bilirubin mg/dL 0.7  --   --   --   --   --   --   --   --   --   --   --    Alkaline Phosphatase U/L 151*  --   --   --   --   --   --   --   --   --   --   --    AST U/L 40  --   --   --   --   --   --   --   --   --   --   --    ALT U/L 30  --   --   --   --   --   --   --   --   --   --   --    Phosphorus mg/dL  --  2.4*  --  1.9*  --   --   --   --   --   --   --  3.7       Vancomycin Administrations:  vancomycin given in the last 96 hours                     vancomycin (VANCOCIN) 2,500 mg in dextrose 5 % (D5W) 500 mL IVPB ()  Restarted 01/30/24 0328     2,500 mg New Bag  0056                    Microbiologic Results:  Microbiology Results (last 7 days)       Procedure Component Value Units Date/Time    Blood culture x two cultures. Draw prior to antibiotics. [[id number]] Collected: 01/29/24 2118    Order Status: Completed Specimen: Blood from Peripheral, Antecubital, Left Updated: 01/30/24 2303     Blood Culture, Routine No Growth to date      No Growth to date    Narrative:      Aerobic and anaerobic    Blood culture x two cultures. Draw prior to antibiotics. [[id number]] Collected: 01/29/24 1929    Order  Status: Completed Specimen: Blood from Peripheral, Antecubital, Right Updated: 01/30/24 2103     Blood Culture, Routine No Growth to date      No Growth to date    Narrative:      Aerobic and anaerobic    AFB Culture & Smear [[id number]] Collected: 01/30/24 0219    Order Status: Sent Specimen: Abscess from Groin Updated: 01/30/24 1542    AFB Culture & Smear [[id number]] Collected: 01/30/24 0228    Order Status: Sent Specimen: Wound from Groin Updated: 01/30/24 1542    Gram stain [[id number]] Collected: 01/30/24 0228    Order Status: Completed Specimen: Wound from Groin Updated: 01/30/24 1035     Gram Stain Result Moderate Gram positive cocci in pairs and chains      No WBC's    Narrative:      Right groin tissue    Gram stain [[id number]] Collected: 01/30/24 0219    Order Status: Completed Specimen: Abscess from Groin Updated: 01/30/24 1035     Gram Stain Result Moderate Gram positive cocci in pairs and chains      No WBC's    Narrative:      Right groin abcess    Fungus culture [[id number]] Collected: 01/30/24 0219    Order Status: Sent Specimen: Abscess from Groin Updated: 01/30/24 0247    Culture, Anaerobe [[id number]] Collected: 01/30/24 0219    Order Status: Sent Specimen: Abscess from Groin Updated: 01/30/24 0245    Aerobic culture [[id number]] Collected: 01/30/24 0219    Order Status: Sent Specimen: Abscess from Groin Updated: 01/30/24 0244    Fungus culture [[id number]] Collected: 01/30/24 0228    Order Status: Sent Specimen: Wound from Groin Updated: 01/30/24 0243    Culture, Anaerobe [[id number]] Collected: 01/30/24 0228    Order Status: Sent Specimen: Wound from Groin Updated: 01/30/24 0240    Aerobic culture [[id number]] Collected: 01/30/24 0228    Order Status: Sent Specimen: Wound from Groin Updated: 01/30/24 0239    AFB Culture & Smear [[id number]] Collected: 01/30/24 0058    Order Status: Sent Specimen: Abscess from Groin Updated: 01/30/24 0058    Fungus culture [[id number]] Collected: 01/29/24 1115     Order Status: Sent Specimen: Abscess from Groin Updated: 01/30/24 0019    Gram stain [[id number]] Collected: 01/29/24 1115    Order Status: Sent Specimen: Abscess from Groin Updated: 01/30/24 0019    Culture, Anaerobe [[id number]] Collected: 01/29/24 1115    Order Status: Sent Specimen: Abscess from Groin Updated: 01/30/24 0019    Aerobic culture [[id number]] Collected: 01/29/24 1115    Order Status: Sent Specimen: Abscess from Groin Updated: 01/30/24 0019    AFB Culture & Smear [[id number]] Collected: 01/29/24 1115    Order Status: Sent Specimen: Abscess from Groin Updated: 01/30/24 0019    Culture, Anaerobe [[id number]] Collected: 01/29/24 1115    Order Status: Sent Specimen: Abscess from Groin Updated: 01/30/24 0019    Aerobic culture [[id number]] Collected: 01/29/24 1115    Order Status: Sent Specimen: Abscess from Groin Updated: 01/30/24 0019    AFB Culture & Smear [[id number]] Collected: 01/29/24 1115    Order Status: Sent Specimen: Abscess from Groin Updated: 01/30/24 0019    Gram stain [[id number]] Collected: 01/29/24 1115    Order Status: Sent Specimen: Abscess from Groin Updated: 01/30/24 0019    Fungus culture [[id number]] Collected: 01/29/24 1115    Order Status: Sent Specimen: Abscess from Groin Updated: 01/30/24 0019

## 2024-01-31 NOTE — PROGRESS NOTES
To OR today for right groin/buttock wound debridement/washout, takeback from a few nights ago.  Also consented for Trialysis line placement, will perform in OR after intubation, for ALVARADO

## 2024-01-31 NOTE — ASSESSMENT & PLAN NOTE
Presented in DKA.  Now transitioned to subQ insulin regimen.  A1C 10.4.  newly diagnosed DM.  - continue SQ regimen with 10U detimir and ISS.  - may need to adjust this regimen depending on tube feeds tolerance.

## 2024-01-31 NOTE — ASSESSMENT & PLAN NOTE
Likely related to lactic acidosis an progressive renal dysfunction.  Nephrology planning on RRT.  Surgery performing source control.    - bicarb gtt while awaiting HD initiation.

## 2024-01-31 NOTE — PROGRESS NOTES
[facility] - Intensive Care  General Surgery  Progress Note    Subjective:     History of Present Illness:  53 year old morbidly obese female who does not routinely go to the doctor presents to the ED with malaise, nausea, vomiting, and groin, buttock, perineal swelling and tenderness. She began feeling ill a few days ago.     On arrival to the ED she is tachycardic to 120, hypertensive without fever. Labs demonstrate leukocytosis of 21, , with lactic acidosis and associated ALVARADO with cr 3.8, hyperglycemia with blood glucose of 824 accompanied by severe electrolyte derangements. CT imaging obtained demonstrates diffuse subcutaneous air along buttocks, perineum, anterior vulva, as well as bilateral thighs.     No prior abdominal surgeries. She denies problems with her heart or lungs. Non smoker. Does not routinely take any medications.    Post-Op Info:  Procedure(s) (LRB):  IRRIGATION & DEBRIDEMENT, WOUND DEBRIDEMENT (Right)  INSERTION, CATHETER, TRIPLE LUMEN, HEMODIALYSIS, TEMPORARY (Right)   Day of Surgery     Interval History: Since yesterday, worsening renal function with progression into ARF now with worsening acidosis refractory to fluid and lasix. Febrile with rising leukocytosis. Taken to OR today for repeat debridement, trialysis line placement in anticipation for CRRT.   Vanc/zosyn Dc'd due to nephrotoxicity; cefepime, meropenam, clinda. ID consulted, nephrology consulted, and PCC now following as she remains intubated post operatively.     Medications:  Continuous Infusions:   fentanyl      propofoL      sodium bicarbonate 150 mEq in dextrose 5 % (D5W) 1,000 mL infusion 100 mL/hr at 01/31/24 0949     Scheduled Meds:   sodium chloride 0.9%   Intravenous Once    chlorhexidine  15 mL Mouth/Throat BID    clindamycin IV (PEDS and ADULTS)  900 mg Intravenous Q6H    famotidine (PF)  20 mg Intravenous Daily    insulin detemir U-100  10 Units Subcutaneous QHS    [START ON 2/1/2024] meropenem (MERREM) IVPB  1 g  Intravenous Daily    mupirocin   Nasal BID     PRN Meds:sodium chloride 0.9%, acetaminophen, acetaminophen, albuterol-ipratropium, dextrose 10 % in water (D10W), dextrose 10 % in water (D10W), dextrose 10%, dextrose 10%, dextrose 10%, dextrose 10%, fentanyl, glucagon (human recombinant), glucose, glucose, insulin aspart U-100, melatonin, naloxone, ondansetron, potassium chloride **AND** potassium chloride **AND** potassium chloride, prochlorperazine, sodium chloride 0.9%, sodium chloride 0.9%, Pharmacy to dose Vancomycin consult **AND** vancomycin - pharmacy to dose     Review of patient's allergies indicates:  No Known Allergies  Objective:     Vital Signs (Most Recent):  Temp: 97.5 °F (36.4 °C) (01/31/24 1327)  Pulse: (!) 111 (01/31/24 1327)  Resp: (!) 22 (01/31/24 1327)  BP: (!) 150/76 (01/31/24 1313)  SpO2: 100 % (01/31/24 1327) Vital Signs (24h Range):  Temp:  [97.5 °F (36.4 °C)-103.1 °F (39.5 °C)] 97.5 °F (36.4 °C)  Pulse:  [] 111  Resp:  [22-33] 22  SpO2:  [97 %-100 %] 100 %  BP: ()/() 150/76     Weight: (!) 150.1 kg (330 lb 14.6 oz)  Body mass index is 53.41 kg/m².    Intake/Output - Last 3 Shifts         01/29 0700 01/30 0659 01/30 0700 01/31 0659 01/31 0700 02/01 0659    P.O.  0     I.V. (mL/kg) 838.8 (5.5) 2244.4 (15) 732.4 (4.9)    IV Piggyback 3874 1483.3 849.2    Total Intake(mL/kg) 4712.8 (30.8) 3727.7 (24.8) 1581.6 (10.5)    Urine (mL/kg/hr) 360 100 (0) 0 (0)    Blood 200  100    Total Output 560 100 100    Net +4152.8 +3627.7 +1481.6                    Physical Exam  Vitals reviewed.   Constitutional:       General: She is not in acute distress.     Appearance: She is obese. She is ill-appearing.   HENT:      Head: Normocephalic.   Neck:      Comments: Right IJ trialysis   Cardiovascular:      Rate and Rhythm: Regular rhythm. Tachycardia present.      Pulses: Normal pulses.   Pulmonary:      Effort: Pulmonary effort is normal.      Comments: Mechanically ventilated   Abdominal:       Palpations: Abdomen is soft.      Tenderness: There is no abdominal tenderness.   Genitourinary:     Comments: Glynn in place with clear yellow urine   Musculoskeletal:      Comments: Dressings C/D/I. Surrounding skin soft edematous, no crepitus    Skin:     General: Skin is warm and dry.   Neurological:      General: No focal deficit present.      Comments: sedated          Significant Labs:  I have reviewed all pertinent lab results within the past 24 hours.  CBC:   Recent Labs   Lab 01/31/24  0519   WBC 16.06*   RBC 4.39   HGB 11.3*   HCT 35.0*      MCV 80*   MCH 25.7*   MCHC 32.3       CMP:   Recent Labs   Lab 01/29/24  1929 01/30/24  0042 01/31/24  0827   *   < > 197*   CALCIUM 8.9   < > 7.5*   ALBUMIN 2.6*  --   --    PROT 8.0  --   --    *   < > 135*   K 4.2   < > 4.6   CO2 17*   < > 14*   CL 94*   < > 107   BUN 44*   < > 55*   CREATININE 3.8*   < > 5.4*   ALKPHOS 151*  --   --    ALT 30  --   --    AST 40  --   --    BILITOT 0.7  --   --     < > = values in this interval not displayed.         Significant Diagnostics:  I have reviewed all pertinent imaging results/findings within the past 24 hours.  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 Now s/p incision and drainage with soft tissue debridement on 1/29. Take back 1/31 for wound exploration requiring further extensive debridement and additional pubic incision. CRRT initiated 1/31.     - return to OR 2/2 for repeat wound exploration,   - Okay to change overlying dressings (ABD pads), keep kerlix packing in place   - Infectious disease consulted, appreciate antibiotic guidance. Initially vanc/zosyn/clinda (1/29); switched to cefepime, meropenam with continued vanc/clinda 1/30.    Operative cx: presumptive proteus pending finalized data    Blood cx: NGTD   - Nephrology consulted,  appreciate assistance, plan to start CRRT today \  - Insulin per primary   - Continue broad spectrum IV antibiotics with clindamycin  - Consider OGT trophic feeds while intubated   - Okay for DVT ppx   - Continue ICU care        Suzanne Woo MD  General Surgery  [facility] - Intensive Care

## 2024-01-31 NOTE — BRIEF OP NOTE
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 Item Serial No.  Lot No. LRB No. Used Action   TRAY CATH PWR TRIALYSIS 15CM - [id number]  TRAY CATH PWR TRIALYSIS 15CM [id number] [employer] [id number] Right 1 Implanted       Operative Findings: Multiple areas of necrotic tissue within pre-existing wound bed - extensive debridement required at inferior portion of incision medially and laterally down to the level of the muscular fascia. Portion of necrotic buttock skin resected.      Additional incision made over pubis stretching from midline to right lateral edge, found to have necrotic tissue within this area that did not appear to track/communicate with areas over right thigh/buttock.     Estimated Blood Loss: 100 mL    Estimated Blood Loss has been documented.         Specimens:   Specimen (24h ago, onward)       Start     Ordered    01/31/24 1139  Specimen to Pathology, Surgery General Surgery  Once        Comments: Pre-op Diagnosis: 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]Procedure(s):EXPLORATION, WOUNDINSERTION, CATHETER, TRIPLE LUMEN, HEMODIALYSIS, TEMPORARY Number of specimens: 1Name of specimens: Right Necrotic Groin Mass     References:    Click here for ordering Quick Tip   Question Answer Comment   Procedure Type: General Surgery    Which provider would you like to cc? ROSENDA JUNIOR    Release to patient Immediate        01/31/24 1140                    [id number]

## 2024-01-31 NOTE — ANESTHESIA PROCEDURE NOTES
Intubation    Date/Time: 1/31/2024 10:20 AM    Performed by: Brett Sanchez CRNA  Authorized by: Florian Loaiza II, MD    Intubation:     Induction:  Intravenous    Intubated:  Postinduction    Mask Ventilation:  Not attempted    Attempts:  1    Attempted By:  CRNA    Method of Intubation:  Video laryngoscopy    Blade:  [place] 3    Laryngeal View Grade: Grade I - full view of cords      Difficult Airway Encountered?: No      Complications:  None    Airway Device:  Oral endotracheal tube    Airway Device Size:  7.5    Inflation Amount (mL):  6    Tube secured:  21    Secured at:  The lips    Placement Verified By:  Capnometry    Complicating Factors:  Obesity and short neck    Findings Post-Intubation:  BS equal bilateral and atraumatic/condition of teeth unchanged

## 2024-01-31 NOTE — ASSESSMENT & PLAN NOTE
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 Now s/p incision and drainage with soft tissue debridement on 1/29. Take back 1/31 for wound exploration requiring further extensive debridement and additional pubic incision. CRRT initiated 1/31.     - return to OR 2/2 for repeat wound exploration,   - Okay to change overlying dressings (ABD pads), keep kerlix packing in place   - Infectious disease consulted, appreciate antibiotic guidance. Initially vanc/zosyn/clinda (1/29); switched to cefepime, meropenam with continued vanc/clinda 1/30.    Operative cx: presumptive proteus pending finalized data    Blood cx: NGTD   - Nephrology consulted, appreciate assistance, plan to start CRRT today \  - Insulin per primary   - Continue broad spectrum IV antibiotics with clindamycin  - Consider OGT trophic feeds while intubated   - Okay for DVT ppx   - Continue ICU care

## 2024-01-31 NOTE — ASSESSMENT & PLAN NOTE
Patient with acute kidney injury/acute renal failure likely due to pre-renal azotemia due to IVVD ALVARADO is currently stable. Baseline creatinine  unknown  - Labs reviewed- Renal function/electrolytes with Estimated Creatinine Clearance: 18.2 mL/min (A) (based on SCr of 5.4 mg/dL (H)). according to latest data. Monitor urine output and serial BMP and adjust therapy as needed. Avoid nephrotoxins and renally dose meds for GFR listed above.    Has elevated Cr. No prior record for comparison,on IVF and nephrology is consulted.worsening likely ATN,will be started on HD today.

## 2024-01-31 NOTE — ASSESSMENT & PLAN NOTE
Surgery following.  To OR on 1/30 and 1/31.  Not on pressors at this time.  Proteus growing on preliminary cultures with susceptibilities pending.  ID following.  - wound care  - further intervention per surgery.

## 2024-01-31 NOTE — PLAN OF CARE
Patient off unit.  Unable to complete the DC needs assessment at this time Will follow up at a later time.

## 2024-01-31 NOTE — CONSULTS
[facility] - Intensive Care  Critical Care Medicine  Consult Note    Patient Name: Sarah Saravia  MRN: [id number]  Admission Date: 1/29/2024  Hospital Length of Stay: 2 days  Code Status: Full Code  Attending Physician: Carlos Acuna, *   Primary Care Provider: [facility]   Principal Problem: Ayaz's gangrene    Inpatient consult to Pulmonology  Consult performed by: Levon Mayers MD  Consult ordered by: Carlos Acuna MD        Subjective:     HPI:  Sarah Saravia is a 53 year old female with history of morbid obesity, who initially presented with vomiting x 4 days.  She was found to have a right groin wound.  She had a leukocytosis (21.6), ALVARADO (Cr 3.8), and was in DKA.  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  Patient was taken to the OR for debridement.  She was fluid resuscitated and started on vanc/zosyn/clinda.  Also treated for DKA with insulin infusion.  Admitted to the ICU for further care.     She returned to the OR on 1/31/2024 and returned intubated.  ICU was consulted for management of ventilator and critical illness.  She now has a right sided trialysis in place from the OR.  Nephrology planning on starting HD for progressive renal dysfunction.  She has since been transitioned to SQ insulin.    Hospital/ICU Course:  When initially seen, she is resting on the ventilator.  She is hypertensive, and tachycardic, presumably due to pain from surgery. Started on propofol and fentanyl infusions.  Chest x-ray performed and confirmed HD line in appropriate positioning, ETT in appropriate position, and no complications noted.  ABG performed which revealed a predominate metabolic acidosis and adequate oxygenation.  Pending RRT initiation.     History reviewed. No pertinent past medical history.    Past Surgical History:   Procedure Laterality Date    INCISION AND DRAINAGE OF PERIRECTAL REGION N/A 1/29/2024    Procedure: INCISION  AND DRAINAGE, PERIRECTAL REGION;  Surgeon: Axel Ramsay MD;  Location: [facility] OR;  Service: General;  Laterality: N/A;       Review of patient's allergies indicates:  No Known Allergies    Family History    None       Tobacco Use    Smoking status: Not on file    Smokeless tobacco: Not on file   Substance and Sexual Activity    Alcohol use: Not on file    Drug use: Not on file    Sexual activity: Not on file         Review of Systems   Unable to perform ROS: Acuity of condition     Objective:     Vital Signs (Most Recent):  Temp: 97.7 °F (36.5 °C) (01/31/24 1348)  Pulse: (!) 113 (01/31/24 1348)  Resp: (!) 29 (01/31/24 1358)  BP: (!) 150/76 (01/31/24 1313)  SpO2: 100 % (01/31/24 1348) Vital Signs (24h Range):  Temp:  [97.5 °F (36.4 °C)-103.1 °F (39.5 °C)] 97.7 °F (36.5 °C)  Pulse:  [] 113  Resp:  [22-33] 29  SpO2:  [97 %-100 %] 100 %  BP: ()/() 150/76     Weight: (!) 150.1 kg (330 lb 14.6 oz)  Body mass index is 53.41 kg/m².      Intake/Output Summary (Last 24 hours) at 1/31/2024 1409  Last data filed at 1/31/2024 1257  Gross per 24 hour   Intake 3995.92 ml   Output 165 ml   Net 3830.92 ml        Physical Exam  Constitutional:       General: She is not in acute distress.     Appearance: She is obese. She is ill-appearing. She is not toxic-appearing.   HENT:      Head: Normocephalic and atraumatic.      Nose: Nose normal.      Mouth/Throat:      Mouth: Mucous membranes are moist.   Eyes:      Extraocular Movements: Extraocular movements intact.      Pupils: Pupils are equal, round, and reactive to light.   Cardiovascular:      Rate and Rhythm: Regular rhythm. Tachycardia present.   Pulmonary:      Effort: Pulmonary effort is normal.      Comments: Mild rales  Abdominal:      General: Abdomen is flat. There is no distension.      Palpations: Abdomen is soft.      Tenderness: There is no abdominal tenderness.   Musculoskeletal:         General: Normal range of motion.      Cervical back: Normal  range of motion.      Right lower leg: No edema.      Left lower leg: No edema.   Skin:     General: Skin is warm and dry.      Comments: Wound to right groin to buttock   Neurological:      Mental Status: She is disoriented.      Comments: Sedated.          Vents:  Vent Mode: PRVC (01/31/24 1327)  Set Rate: 18 BPM (01/31/24 1348)  Vt Set: 420 mL (01/31/24 1348)  PEEP/CPAP: 5 cmH20 (01/31/24 1348)  Oxygen Concentration (%): 30 (01/31/24 1348)  Peak Airway Pressure: 24 cmH20 (01/31/24 1348)  Total Ve: 11.4 L/m (01/31/24 1348)  F/VT Ratio<105 (RSBI): (!) 59.91 (01/31/24 1348)    Lines/Drains/Airways       Central Venous Catheter Line  Duration             Trialysis (Dialysis) Catheter 01/31/24 1051 right internal jugular <1 day              Drain  Duration                  Urethral Catheter 01/29/24 2315 1 day              Airway  Duration                  Airway - Non-Surgical 01/31/24 1020 <1 day              Arterial Line  Duration             Arterial Line 01/31/24 1025 Right Radial <1 day              Peripheral Intravenous Line  Duration                  Peripheral IV - Single Lumen 01/29/24 1922 20 G Right Antecubital 1 day         Peripheral IV - Single Lumen 01/29/24 2116 Left Antecubital 1 day         Peripheral IV - Single Lumen 01/30/24 0922 20 G Left;Posterior Hand 1 day                    Significant Labs:    CBC/Anemia Profile:  Recent Labs   Lab 01/30/24  0042 01/30/24  0447 01/31/24  0519   WBC 17.67* 14.73* 16.06*   HGB 13.0 12.0 11.3*   HCT 42.1 37.9 35.0*    186 197   MCV 84 84 80*   RDW 14.8* 14.6* 14.5        Chemistries:  Recent Labs   Lab 01/29/24  1929 01/30/24  0042 01/30/24  0447 01/30/24  0834 01/31/24  0100 01/31/24  0519 01/31/24  0520 01/31/24  0827   * 134* 134*   < > 134* 136  --  135*   K 4.2 3.9 3.8   < > 3.8 3.9  --  4.6   CL 94* 103 103   < > 105 103  --  107   CO2 17* 15* 17*   < > 16* 16*  --  14*   BUN 44* 44* 46*   < > 51* 51*  --  55*   CREATININE 3.8* 3.5* 3.6*    < > 4.7* 5.0*  --  5.4*   CALCIUM 8.9 8.3* 8.3*   < > 7.8* 8.3*  --  7.5*   ALBUMIN 2.6*  --   --   --   --   --   --   --    PROT 8.0  --   --   --   --   --   --   --    BILITOT 0.7  --   --   --   --   --   --   --    ALKPHOS 151*  --   --   --   --   --   --   --    ALT 30  --   --   --   --   --   --   --    AST 40  --   --   --   --   --   --   --    PHOS  --  2.4* 1.9*  --   --   --  3.7  --     < > = values in this interval not displayed.       All pertinent labs within the past 24 hours have been reviewed.    Significant Imaging:   I have reviewed all pertinent imaging results/findings within the past 24 hours.    [facility]  Recent Labs   Lab 01/31/24  1348   PH 7.239*   PO2 99   PCO2 43.9   HCO3 18.8*   BE -8*     Assessment/Plan:     Renal/  * Ayaz's gangrene  Surgery following.  To OR on 1/30 and 1/31.  Not on pressors at this time.  Proteus growing on preliminary cultures with susceptibilities pending.  ID following.  - wound care  - further intervention per surgery.    Metabolic acidosis  Likely related to lactic acidosis an progressive renal dysfunction.  Nephrology planning on RRT.  Surgery performing source control.    - bicarb gtt while awaiting HD initiation.      Hyponatremia  Likely secondary to progressive renal dysfunction.    ALVARADO (acute kidney injury)  Nephrology following.  Expecting to begin RRT today.  HD line in place and confirmed ok to use.    ID  Severe sepsis  This patient does have evidence of infective focus  My overall impression is sepsis.  Source: Skin and Soft Tissue (location groin)  Antibiotics given-   Antibiotics (72h ago, onward)      Start     Stop Route Frequency Ordered    02/01/24 0900  meropenem (MERREM) 1 g in sodium chloride 0.9 % 100 mL IVPB (MB+)         -- IV Daily 01/31/24 1114    01/30/24 1200  clindamycin in D5W 900 mg/50 mL IVPB 900 mg         -- IV Every 6 hours (non-standard times) 01/30/24 0927    01/30/24 0945  mupirocin 2 % ointment         02/04/24 0859  Nasl 2 times daily 01/30/24 0831    01/29/24 1944  vancomycin - pharmacy to dose  (vancomycin IVPB (PEDS and ADULTS))        See Hyperspace for full Linked Orders Report.    -- IV pharmacy to manage frequency 01/29/24 1846          Latest lactate reviewed-  Recent Labs   Lab 01/29/24  1950 01/30/24  0042 01/31/24  0100   LACTATE  --    < > 2.4*   POCLAC 5.64*  --   --     < > = values in this interval not displayed.     Organ dysfunction indicated by Acute kidney injury, Acute respiratory failure, and Encephalopathy    Fluid challenge Not needed - patient is not hypotensive  (already resuscitated on presentation)    Post- resuscitation assessment No - Post resuscitation assessment not needed       Will Not start Pressors- Levophed for MAP of 65  Source control achieved by: surgical resection and antibiotic management.    Endocrine  Morbid obesity  Complicates all aspects of care.    Diabetic acidosis without coma  Presented in DKA.  Now transitioned to subQ insulin regimen.  A1C 10.4.  newly diagnosed DM.  - continue SQ regimen with 10U detimir and ISS.  - may need to adjust this regimen depending on tube feeds tolerance.          Critical Care Daily Checklist:    A: Awake: RASS Goal/Actual Goal:    Actual:     B: Spontaneous Breathing Trial Performed?     C: SAT & SBT Coordinated?  Not today                      D: Delirium: CAM-ICU     E: Early Mobility Performed? no   F: Feeding Goal:    Status:     Current Diet Order   Procedures    Diet NPO      AS: Analgesia/Sedation Fent/prop   T: Thromboembolic Prophylaxis Heparin SQ   H: HOB > 300 Yes   U: Stress Ulcer Prophylaxis (if needed) pepcid   G: Glucose Control PRN and scheduled   B: Bowel Function     I: Indwelling Catheter (Lines & Meza) Necessity Right HD, right a. Line, ETT, meza, PIV   D: De-escalation of Antimicrobials/Pharmacotherapies As above, per ID    Plan for the day/ETD Recover from surgery    Code Status:  Family/Goals of Care: Full Code  recovery      Critical Care Time: 50 minutes  Critical secondary to Patient has a condition that poses threat to life and bodily function: Acute Renal Failure and acute hypoxic respiratory failure requiring intubation, ros's gangrene, severe sepsis.     Critical care was time spent personally by me on the following activities: development of treatment plan with patient or surrogate and bedside caregivers, discussions with consultants, evaluation of patient's response to treatment, examination of patient, ordering and performing treatments and interventions, ordering and review of laboratory studies, ordering and review of radiographic studies, pulse oximetry, re-evaluation of patient's condition. This critical care time did not overlap with that of any other provider or involve time for any procedures.    Thank you for your consult. I will follow-up with patient. Please contact us if you have any additional questions.     Levon Mayers MD  Critical Care Medicine  [facility] - Intensive Care

## 2024-01-31 NOTE — HOSPITAL COURSE
When initially seen, she is resting on the ventilator.  She is hypertensive, and tachycardic, presumably due to pain from surgery. Started on propofol and fentanyl infusions.  Chest x-ray performed and confirmed HD line in appropriate positioning, ETT in appropriate position, and no complications noted.  ABG performed which revealed a predominate metabolic acidosis and adequate oxygenation.  Pending RRT initiation.

## 2024-01-31 NOTE — CONSULTS
Date of Admit: 1/29/2024  Length of Stay: 2   Days  Consulting Staff:MD Kalpana    Date of Consult: 1/31/2024    Reason for Consultation     Worsening vishal following dka/septic shock from necrotizing soft tissue infection    Subjective:      History of Present Illness:  Sarah Saravia is a 53 y.o. year old female with a past medical history significant for no dm in the past. Pt present to [facility] on 1/29 for n/v and groin pain. Pt  is altered now. Pt is now in the ICU of [facility] with concerns of dka, vishal, and ros's gangrene. Pt noted with lactic acidosis. Pt's creatinine went from 3.8 to 5.4. Pt noted to have low uop.  Medical history from daughter (Duran).  Pt didn't go the doctors per her daughter.  Past Medical History:  None known    Past Surgical History:  Past Surgical History:   Procedure Laterality Date    INCISION AND DRAINAGE OF PERIRECTAL REGION N/A 1/29/2024    Procedure: INCISION AND DRAINAGE, PERIRECTAL REGION;  Surgeon: Axel Ramsay MD;  Location: [facility];  Service: General;  Laterality: N/A;     tubal  Allergies:  Review of patient's allergies indicates:  No Known Allergies    Home Medications:    No current facility-administered medications on file prior to encounter.     No current outpatient medications on file prior to encounter.       Family History:    Mom-dm, htn  Social History:     No  tobacco  Social drinker  Not     Review of Systems:10pt ros-unable to ilicit from pt       Objective:     Scheduled Meds:   clindamycin IV (PEDS and ADULTS)  900 mg Intravenous Q6H    famotidine (PF)  20 mg Intravenous Daily    insulin detemir U-100  10 Units Subcutaneous QHS    meropenem (MERREM) IVPB  1 g Intravenous Q12H    mupirocin   Nasal BID     Continuous Infusions:   sodium bicarbonate 150 mEq in dextrose 5 % (D5W) 1,000 mL infusion 100 mL/hr at 01/31/24 0949     PRN Meds:.acetaminophen, acetaminophen, albuterol-ipratropium, dextrose 10 % in water (D10W), dextrose 10 % in water  "(D10W), dextrose 10%, dextrose 10%, dextrose 10%, dextrose 10%, glucagon (human recombinant), glucose, glucose, insulin aspart U-100, melatonin, naloxone, ondansetron, potassium chloride **AND** potassium chloride **AND** potassium chloride, prochlorperazine, sodium chloride 0.9%, Pharmacy to dose Vancomycin consult **AND** vancomycin - pharmacy to dose      Physical Examination:    Vitals: /76   Pulse 85   Temp (!) 100.4 °F (38 °C) (Axillary)   Resp (!) 28   Ht 5' 6" (1.676 m)   Wt (!) 150.1 kg (330 lb 14.6 oz)   LMP 01/01/2024 (Approximate) Comment: tubal ligation  SpO2 99%   BMI 53.41 kg/m²    I/O last 3 completed shifts:  In: 8440.5 [I.V.:3083.2; IV Piggyback:5357.3]  Out: 660 [Urine:460; Blood:200]  I/O this shift:  In: 881.6 [I.V.:732.4; IV Piggyback:149.2]  Out: -       General: wd female , obese in nad  HEENT:ncat,eyes closed, + bipap mask  CVS:s1s2 regular  PULM:coarse   ABD:no bs, soft  EXT:no leg edema  NEURO:not alert  Laboratory:  Recent Results (from the past 24 hour(s))   POCT glucose    Collection Time: 01/30/24 11:49 AM   Result Value Ref Range    POCT Glucose 160 (H) 70 - 110 mg/dL   POCT glucose    Collection Time: 01/30/24 12:56 PM   Result Value Ref Range    POCT Glucose 156 (H) 70 - 110 mg/dL   POCT glucose    Collection Time: 01/30/24  1:53 PM   Result Value Ref Range    POCT Glucose 155 (H) 70 - 110 mg/dL   Basic metabolic panel    Collection Time: 01/30/24  1:55 PM   Result Value Ref Range    Sodium 134 (L) 136 - 145 mmol/L    Potassium 3.9 3.5 - 5.1 mmol/L    Chloride 105 95 - 110 mmol/L    CO2 17 (L) 23 - 29 mmol/L    Glucose 198 (H) 70 - 110 mg/dL    BUN 47 (H) 6 - 20 mg/dL    Creatinine 3.9 (H) 0.5 - 1.4 mg/dL    Calcium 7.9 (L) 8.7 - 10.5 mg/dL    Anion Gap 12 8 - 16 mmol/L    eGFR 13 (A) >60 mL/min/1.73 m^2   Vancomycin, random    Collection Time: 01/30/24  1:55 PM   Result Value Ref Range    Vancomycin, Random 30.5 Not established ug/mL   Lactic acid, plasma    " Collection Time: 01/30/24  1:56 PM   Result Value Ref Range    Lactate (Lactic Acid) 2.6 (H) 0.5 - 2.2 mmol/L   POCT glucose    Collection Time: 01/30/24  2:58 PM   Result Value Ref Range    POCT Glucose 160 (H) 70 - 110 mg/dL   POCT glucose    Collection Time: 01/30/24  3:55 PM   Result Value Ref Range    POCT Glucose 136 (H) 70 - 110 mg/dL   Basic metabolic panel    Collection Time: 01/30/24  4:33 PM   Result Value Ref Range    Sodium 136 136 - 145 mmol/L    Potassium 3.6 3.5 - 5.1 mmol/L    Chloride 105 95 - 110 mmol/L    CO2 20 (L) 23 - 29 mmol/L    Glucose 137 (H) 70 - 110 mg/dL    BUN 50 (H) 6 - 20 mg/dL    Creatinine 4.4 (H) 0.5 - 1.4 mg/dL    Calcium 7.5 (L) 8.7 - 10.5 mg/dL    Anion Gap 11 8 - 16 mmol/L    eGFR 11 (A) >60 mL/min/1.73 m^2   POCT glucose    Collection Time: 01/30/24  4:55 PM   Result Value Ref Range    POCT Glucose 141 (H) 70 - 110 mg/dL   POCT glucose    Collection Time: 01/30/24  5:53 PM   Result Value Ref Range    POCT Glucose 157 (H) 70 - 110 mg/dL   POCT glucose    Collection Time: 01/30/24  7:05 PM   Result Value Ref Range    POCT Glucose 143 (H) 70 - 110 mg/dL   POCT glucose    Collection Time: 01/30/24  8:01 PM   Result Value Ref Range    POCT Glucose 153 (H) 70 - 110 mg/dL   Basic metabolic panel    Collection Time: 01/30/24  8:43 PM   Result Value Ref Range    Sodium 136 136 - 145 mmol/L    Potassium 3.5 3.5 - 5.1 mmol/L    Chloride 106 95 - 110 mmol/L    CO2 16 (L) 23 - 29 mmol/L    Glucose 125 (H) 70 - 110 mg/dL    BUN 50 (H) 6 - 20 mg/dL    Creatinine 4.6 (H) 0.5 - 1.4 mg/dL    Calcium 7.7 (L) 8.7 - 10.5 mg/dL    Anion Gap 14 8 - 16 mmol/L    eGFR 11 (A) >60 mL/min/1.73 m^2   POCT glucose    Collection Time: 01/30/24  9:03 PM   Result Value Ref Range    POCT Glucose 125 (H) 70 - 110 mg/dL   ISTAT PROCEDURE    Collection Time: 01/30/24  9:27 PM   Result Value Ref Range    POC PH 7.277 (LL) 7.35 - 7.45    POC PCO2 46.3 (H) 35 - 45 mmHg    POC PO2 19 (LL) 40 - 60 mmHg    POC HCO3  21.6 (L) 24 - 28 mmol/L    POC BE -5 (L) -2 to 2 mmol/L    POC SATURATED O2 23 95 - 100 %    POC TCO2 23 (L) 24 - 29 mmol/L    Sample VENOUS     Site Other     Allens Test N/A     DelSys Nasal Can     Mode SPONT     Flow 2     Sp02 100    POCT glucose    Collection Time: 01/30/24 10:13 PM   Result Value Ref Range    POCT Glucose 141 (H) 70 - 110 mg/dL   POCT glucose    Collection Time: 01/30/24 11:13 PM   Result Value Ref Range    POCT Glucose 118 (H) 70 - 110 mg/dL   Lactic acid, plasma    Collection Time: 01/31/24  1:00 AM   Result Value Ref Range    Lactate (Lactic Acid) 2.4 (H) 0.5 - 2.2 mmol/L   Basic metabolic panel    Collection Time: 01/31/24  1:00 AM   Result Value Ref Range    Sodium 134 (L) 136 - 145 mmol/L    Potassium 3.8 3.5 - 5.1 mmol/L    Chloride 105 95 - 110 mmol/L    CO2 16 (L) 23 - 29 mmol/L    Glucose 130 (H) 70 - 110 mg/dL    BUN 51 (H) 6 - 20 mg/dL    Creatinine 4.7 (H) 0.5 - 1.4 mg/dL    Calcium 7.8 (L) 8.7 - 10.5 mg/dL    Anion Gap 13 8 - 16 mmol/L    eGFR 11 (A) >60 mL/min/1.73 m^2   ISTAT PROCEDURE    Collection Time: 01/31/24  1:00 AM   Result Value Ref Range    POC PH 7.291 (L) 7.35 - 7.45    POC PCO2 43.1 35 - 45 mmHg    POC PO2 23 (LL) 40 - 60 mmHg    POC HCO3 20.8 (L) 24 - 28 mmol/L    POC BE -6 (L) -2 to 2 mmol/L    POC SATURATED O2 34 95 - 100 %    POC TCO2 22 (L) 24 - 29 mmol/L    Rate 16     Sample VENOUS     Site Other     Allens Test N/A     DelSys CPAP/BiPAP     Mode BiPAP     FiO2 21     Spont Rate 14     Min Vol 20     Sp02 98     IP 16     EP 6    Basic metabolic panel    Collection Time: 01/31/24  5:19 AM   Result Value Ref Range    Sodium 136 136 - 145 mmol/L    Potassium 3.9 3.5 - 5.1 mmol/L    Chloride 103 95 - 110 mmol/L    CO2 16 (L) 23 - 29 mmol/L    Glucose 145 (H) 70 - 110 mg/dL    BUN 51 (H) 6 - 20 mg/dL    Creatinine 5.0 (H) 0.5 - 1.4 mg/dL    Calcium 8.3 (L) 8.7 - 10.5 mg/dL    Anion Gap 17 (H) 8 - 16 mmol/L    eGFR 10 (A) >60 mL/min/1.73 m^2   CBC auto  differential    Collection Time: 01/31/24  5:19 AM   Result Value Ref Range    WBC 16.06 (H) 3.90 - 12.70 K/uL    RBC 4.39 4.00 - 5.40 M/uL    Hemoglobin 11.3 (L) 12.0 - 16.0 g/dL    Hematocrit 35.0 (L) 37.0 - 48.5 %    MCV 80 (L) 82 - 98 fL    MCH 25.7 (L) 27.0 - 31.0 pg    MCHC 32.3 32.0 - 36.0 g/dL    RDW 14.5 11.5 - 14.5 %    Platelets 197 150 - 450 K/uL    MPV 11.9 9.2 - 12.9 fL    Immature Granulocytes CANCELED 0.0 - 0.5 %    Immature Grans (Abs) CANCELED 0.00 - 0.04 K/uL    Lymph # CANCELED 1.0 - 4.8 K/uL    Mono # CANCELED 0.3 - 1.0 K/uL    Eos # CANCELED 0.0 - 0.5 K/uL    Baso # CANCELED 0.00 - 0.20 K/uL    nRBC 0 0 /100 WBC    Gran % 58.0 38.0 - 73.0 %    Lymph % 9.0 (L) 18.0 - 48.0 %    Mono % 4.0 4.0 - 15.0 %    Eosinophil % 1.0 0.0 - 8.0 %    Basophil % 0.0 0.0 - 1.9 %    Bands 28.0 %    Differential Method Manual    Phosphorus    Collection Time: 01/31/24  5:20 AM   Result Value Ref Range    Phosphorus 3.7 2.7 - 4.5 mg/dL   Vancomycin, Random    Collection Time: 01/31/24  5:20 AM   Result Value Ref Range    Vancomycin, Random 28.2 Not established ug/mL   POCT glucose    Collection Time: 01/31/24  8:26 AM   Result Value Ref Range    POCT Glucose 183 (H) 70 - 110 mg/dL   Basic metabolic panel    Collection Time: 01/31/24  8:27 AM   Result Value Ref Range    Sodium 135 (L) 136 - 145 mmol/L    Potassium 4.6 3.5 - 5.1 mmol/L    Chloride 107 95 - 110 mmol/L    CO2 14 (L) 23 - 29 mmol/L    Glucose 197 (H) 70 - 110 mg/dL    BUN 55 (H) 6 - 20 mg/dL    Creatinine 5.4 (H) 0.5 - 1.4 mg/dL    Calcium 7.5 (L) 8.7 - 10.5 mg/dL    Anion Gap 14 8 - 16 mmol/L    eGFR 9 (A) >60 mL/min/1.73 m^2               Diagnostic Tests:     US Retroperitoneal Complete [[id number]] Resulted: 01/31/24 0522   Order Status: Completed Updated: 01/31/24 0524   Narrative:     EXAMINATION:  US RETROPERITONEAL COMPLETE    CLINICAL HISTORY:  Acute renal failure;    TECHNIQUE:  Ultrasound of the kidneys and urinary bladder was performed  including color flow and Doppler evaluation of the kidneys.    COMPARISON:  CT 01/29/2024    FINDINGS:  Right kidney: The right kidney measures 11.3 cm. No cortical thinning. No loss of corticomedullary distinction. Resistive index measures 0.78.  No mass. No renal stone. No hydronephrosis.    Left kidney: The left kidney measures 11.7 cm. No cortical thinning. No loss of corticomedullary distinction. Resistive index measures 0.80.  No mass. No renal stone. No hydronephrosis.    Bladder is decompressed around a Glynn catheter.   Impression:       Bilateral elevated renal resistive indices which is nonspecific although may relate to medical renal disease.  No evidence of hydronephrosis.      Electronically signed by: Juan R Valenzuela MD  Date: 01/31/2024  Time: 05:22        CT Abdomen Pelvis Without Contrast [[id number]] Resulted: 01/29/24 2116   Order Status: Completed Updated: 01/29/24 2119   Narrative:     EXAMINATION:  CT ABDOMEN PELVIS WITHOUT CONTRAST    CLINICAL HISTORY:  Abdominal abscess/infection suspected;swelling and crepitus to R groin;    TECHNIQUE:  Low dose axial images, sagittal and coronal reformations were obtained from the lung bases to the pubic symphysis.  Oral contrast was not administered.    COMPARISON:  None    FINDINGS:  The visualized portion of the heart is unremarkable.  Mild bibasilar atelectatic changes are seen.  No evidence of focal consolidation or pleural effusion.    Liver is enlarged measuring 20 cm.  No significant hepatic abnormality seen on this noncontrast exam.  There is no intra-or extrahepatic biliary ductal dilatation.  The gallbladder is unremarkable.  The stomach, pancreas, spleen, and adrenal glands are unremarkable.    Kidneys show no evidence of stones or hydronephrosis. Ureters are unremarkable along their courses.  Urinary bladder is nondistended.  Uterus is unremarkable.  No significant adnexal abnormalities are seen.    Appendix is visualized and is unremarkable.   The visualized loops of small and large bowel show no evidence of obstruction or inflammation.  No free air or free fluid.    Aorta tapers normally.    No acute osseous abnormality identified. Mild degenerative changes are seen in the spine.    Extensive soft tissue air is seen within the subcutaneous soft tissues of the right groin and inguinal region.  This extends into the right lower quadrant anterior abdominal wall with extensive air also seen involving the proximal medial aspect of the right thigh.  No organized fluid collection or abscess seen.   Impression:       1. Extensive soft tissue air throughout the right groin and inguinal region and extending to involve the right lower quadrant anterior abdominal wall with extensive soft tissue air also seen involving the proximal medial aspect of the right thigh.  This is most concerning for gas-forming infection.  No organized focal fluid collection or abscess seen.  2. Otherwise no additional acute intra-abdominal abnormalities identified.  3. Hepatomegaly.      Electronically signed by: Gaby Childers MD  Date: 01/29/2024  Time: 21:16       Assessment/Plan:     Sarah Saravia is a 53 y.o. female admitted with:     1.vishal. creatininine is worse. Atn? No baseline labs in [facility] or care everywhere. Discussed with Ms. Duran. Family agreeable to hd. Surgery to place hd access.  2.lactic acidosis. Bicarb with hd.  3.dka. cont tx dm. New onset. Hga1c poor. No metformin or sgl2.  4.acute resp failure. Intubation +. +volume. No uf with hd. Uop pitiful.  5.anemia. prbc prn.  6.ros's gangrene. Cont tx. Renal dose meds. Vanc high. Hold for now. Madelyn level in am.  Discussed with rn and ID.      Jailene Worthy

## 2024-01-31 NOTE — SUBJECTIVE & OBJECTIVE
Interval History: Since yesterday, worsening renal function with progression into ARF now with worsening acidosis refractory to fluid and lasix. Febrile with rising leukocytosis. Taken to OR today for repeat debridement, trialysis line placement in anticipation for CRRT.   Vanc/zosyn Dc'd due to nephrotoxicity; cefepime, meropenam, clinda. ID consulted, nephrology consulted, and PCC now following as she remains intubated post operatively.     Medications:  Continuous Infusions:   fentanyl      propofoL      sodium bicarbonate 150 mEq in dextrose 5 % (D5W) 1,000 mL infusion 100 mL/hr at 01/31/24 0949     Scheduled Meds:   sodium chloride 0.9%   Intravenous Once    chlorhexidine  15 mL Mouth/Throat BID    clindamycin IV (PEDS and ADULTS)  900 mg Intravenous Q6H    famotidine (PF)  20 mg Intravenous Daily    insulin detemir U-100  10 Units Subcutaneous QHS    [START ON 2/1/2024] meropenem (MERREM) IVPB  1 g Intravenous Daily    mupirocin   Nasal BID     PRN Meds:sodium chloride 0.9%, acetaminophen, acetaminophen, albuterol-ipratropium, dextrose 10 % in water (D10W), dextrose 10 % in water (D10W), dextrose 10%, dextrose 10%, dextrose 10%, dextrose 10%, fentanyl, glucagon (human recombinant), glucose, glucose, insulin aspart U-100, melatonin, naloxone, ondansetron, potassium chloride **AND** potassium chloride **AND** potassium chloride, prochlorperazine, sodium chloride 0.9%, sodium chloride 0.9%, Pharmacy to dose Vancomycin consult **AND** vancomycin - pharmacy to dose     Review of patient's allergies indicates:  No Known Allergies  Objective:     Vital Signs (Most Recent):  Temp: 97.5 °F (36.4 °C) (01/31/24 1327)  Pulse: (!) 111 (01/31/24 1327)  Resp: (!) 22 (01/31/24 1327)  BP: (!) 150/76 (01/31/24 1313)  SpO2: 100 % (01/31/24 1327) Vital Signs (24h Range):  Temp:  [97.5 °F (36.4 °C)-103.1 °F (39.5 °C)] 97.5 °F (36.4 °C)  Pulse:  [] 111  Resp:  [22-33] 22  SpO2:  [97 %-100 %] 100 %  BP: ()/()  150/76     Weight: (!) 150.1 kg (330 lb 14.6 oz)  Body mass index is 53.41 kg/m².    Intake/Output - Last 3 Shifts         01/29 0700 01/30 0659 01/30 0700 01/31 0659 01/31 0700 02/01 0659    P.O.  0     I.V. (mL/kg) 838.8 (5.5) 2244.4 (15) 732.4 (4.9)    IV Piggyback 3874 1483.3 849.2    Total Intake(mL/kg) 4712.8 (30.8) 3727.7 (24.8) 1581.6 (10.5)    Urine (mL/kg/hr) 360 100 (0) 0 (0)    Blood 200  100    Total Output 560 100 100    Net +4152.8 +3627.7 +1481.6                    Physical Exam  Vitals reviewed.   Constitutional:       General: She is not in acute distress.     Appearance: She is obese. She is ill-appearing.   HENT:      Head: Normocephalic.   Neck:      Comments: Right IJ trialysis   Cardiovascular:      Rate and Rhythm: Regular rhythm. Tachycardia present.      Pulses: Normal pulses.   Pulmonary:      Effort: Pulmonary effort is normal.      Comments: Mechanically ventilated   Abdominal:      Palpations: Abdomen is soft.      Tenderness: There is no abdominal tenderness.   Genitourinary:     Comments: Glynn in place with clear yellow urine   Musculoskeletal:      Comments: Dressings C/D/I. Surrounding skin soft edematous, no crepitus    Skin:     General: Skin is warm and dry.   Neurological:      General: No focal deficit present.      Comments: sedated          Significant Labs:  I have reviewed all pertinent lab results within the past 24 hours.  CBC:   Recent Labs   Lab 01/31/24  0519   WBC 16.06*   RBC 4.39   HGB 11.3*   HCT 35.0*      MCV 80*   MCH 25.7*   MCHC 32.3       CMP:   Recent Labs   Lab 01/29/24  1929 01/30/24  0042 01/31/24  0827   *   < > 197*   CALCIUM 8.9   < > 7.5*   ALBUMIN 2.6*  --   --    PROT 8.0  --   --    *   < > 135*   K 4.2   < > 4.6   CO2 17*   < > 14*   CL 94*   < > 107   BUN 44*   < > 55*   CREATININE 3.8*   < > 5.4*   ALKPHOS 151*  --   --    ALT 30  --   --    AST 40  --   --    BILITOT 0.7  --   --     < > = values in this interval not  displayed.         Significant Diagnostics:  I have reviewed all pertinent imaging results/findings within the past 24 hours.

## 2024-01-31 NOTE — SUBJECTIVE & OBJECTIVE
History reviewed. No pertinent past medical history.    Past Surgical History:   Procedure Laterality Date    INCISION AND DRAINAGE OF PERIRECTAL REGION N/A 1/29/2024    Procedure: INCISION AND DRAINAGE, PERIRECTAL REGION;  Surgeon: Axel Ramsay MD;  Location: [facility];  Service: General;  Laterality: N/A;       Review of patient's allergies indicates:  No Known Allergies    No current facility-administered medications on file prior to encounter.     No current outpatient medications on file prior to encounter.     Family History    None       Tobacco Use    Smoking status: Not on file    Smokeless tobacco: Not on file   Substance and Sexual Activity    Alcohol use: Not on file    Drug use: Not on file    Sexual activity: Not on file     Review of Systems   Unable to perform ROS: Mental status change   (Post surgery, likely related to sedation)  Objective:     Vital Signs (Most Recent):  Temp: 97.9 °F (36.6 °C) (01/31/24 1415)  Pulse: 107 (01/31/24 1415)  Resp: (!) 27 (01/31/24 1415)  BP: 123/61 (01/31/24 1415)  SpO2: 98 % (01/31/24 1415) Vital Signs (24h Range):  Temp:  [97.5 °F (36.4 °C)-102 °F (38.9 °C)] 97.9 °F (36.6 °C)  Pulse:  [] 107  Resp:  [22-33] 27  SpO2:  [90 %-100 %] 98 %  BP: ()/() 123/61  Arterial Line BP: (174-211)/(66-76) 174/66     Weight: (!) 150.1 kg (330 lb 14.6 oz)  Body mass index is 53.41 kg/m².     Physical Exam  Vitals and nursing note reviewed.   Constitutional:       General: She is not in acute distress.     Appearance: Normal appearance. She is obese. She is not ill-appearing.   HENT:      Head: Normocephalic and atraumatic.      Nose: Nose normal.      Mouth/Throat:      Mouth: Mucous membranes are moist.   Eyes:      Extraocular Movements: Extraocular movements intact.   Cardiovascular:      Rate and Rhythm: Tachycardia present.      Pulses: Normal pulses.      Heart sounds: No murmur heard.  Pulmonary:      Effort: Pulmonary effort is normal. No respiratory  distress.   Abdominal:      General: Abdomen is flat.      Palpations: Abdomen is soft.      Tenderness: There is no abdominal tenderness.   Musculoskeletal:      Right lower leg: No edema.      Left lower leg: No edema.      Comments: Right groin wound, packed    Skin:     General: Skin is warm.      Capillary Refill: Capillary refill takes less than 2 seconds.   Neurological:      Mental Status: She is alert.      Comments: A&Ox2 (post anesthesia)                 Significant Labs: All pertinent labs within the past 24 hours have been reviewed.    Significant Imaging: I have reviewed all pertinent imaging results/findings within the past 24 hours.

## 2024-01-31 NOTE — ANESTHESIA PREPROCEDURE EVALUATION
01/31/2024  Sarah Saravia is a 53 y.o., female.  To undergo Procedure(s) (LRB):  INCISION AND DRAINAGE, ABSCESS (N/A)     Denies CP/SOB/GERD/MI/CVA/URI symptoms.  METS > 4  NPO < 8    Past Medical History:  No past medical history on file.    Past Surgical History:  Past Surgical History:   Procedure Laterality Date    INCISION AND DRAINAGE OF PERIRECTAL REGION N/A 1/29/2024    Procedure: INCISION AND DRAINAGE, PERIRECTAL REGION;  Surgeon: Axel Ramsay MD;  Location: [facility];  Service: General;  Laterality: N/A;       Social History:  Social History     Socioeconomic History    Marital status: Single       Medications:  No current facility-administered medications on file prior to encounter.     No current outpatient medications on file prior to encounter.       Allergies:  Review of patient's allergies indicates:  No Known Allergies    Active Problems:  Patient Active Problem List   Diagnosis    Diabetic acidosis without coma    Ayaz's gangrene    Morbid obesity    Severe sepsis    ALVARADO (acute kidney injury)    Hyponatremia         Diagnostic Studies:   Latest Reference Range & Units 01/29/24 19:29   WBC 3.90 - 12.70 K/uL 21.60 (H)   RBC 4.00 - 5.40 M/uL 4.59   Hemoglobin 12.0 - 16.0 g/dL 12.0   Hematocrit 37.0 - 48.5 % 38.5   MCV 82 - 98 fL 84   MCH 27.0 - 31.0 pg 26.1 (L)   MCHC 32.0 - 36.0 g/dL 31.2 (L)   RDW 11.5 - 14.5 % 14.9 (H)   Platelet Count 150 - 450 K/uL 217   MPV 9.2 - 12.9 fL 11.5   Gran % 38.0 - 73.0 % 87.8 (H)   Lymph % 18.0 - 48.0 % 5.6 (L)   Mono % 4.0 - 15.0 % 4.3   Eosinophil % 0.0 - 8.0 % 0.5   Basophil % 0.0 - 1.9 % 0.6   Immature Granulocytes 0.0 - 0.5 % 1.2 (H)   Gran # (ANC) 1.8 - 7.7 K/uL 19.0 (H)   Lymph # 1.0 - 4.8 K/uL 1.2   Mono # 0.3 - 1.0 K/uL 0.9   Eos # 0.0 - 0.5 K/uL 0.1   Baso # 0.00 - 0.20 K/uL 0.13   Immature Grans (Abs) 0.00 - 0.04 K/uL 0.25 (H)   nRBC 0 /100 WBC 0   Differential Method  Automated      Latest Reference Range & Units 01/29/24 19:29   Sodium 136 -  145 mmol/L 130 (L)   Potassium 3.5 - 5.1 mmol/L 4.2   Chloride 95 - 110 mmol/L 94 (L)   CO2 23 - 29 mmol/L 17 (L)   Anion Gap 8 - 16 mmol/L 19 (H)   BUN 6 - 20 mg/dL 44 (H)   Creatinine 0.5 - 1.4 mg/dL 3.8 (H)   eGFR >60 mL/min/1.73 m^2 14 !   Glucose 70 - 110 mg/dL 824 (HH)   Calcium 8.7 - 10.5 mg/dL 8.9   ALP 55 - 135 U/L 151 (H)   PROTEIN TOTAL 6.0 - 8.4 g/dL 8.0   Albumin 3.5 - 5.2 g/dL 2.6 (L)   BILIRUBIN TOTAL 0.1 - 1.0 mg/dL 0.7   AST 10 - 40 U/L 40   ALT 10 - 44 U/L 30   CRP 0.0 - 8.2 mg/L 530.2 (H)     EKG (1/29/24):  ST    24 Hour Vitals:  Temp:  [37.1 °C (98.7 °F)-39.5 °C (103.1 °F)] 38 °C (100.4 °F)  Pulse:  [] 87  Resp:  [24-35] 29  SpO2:  [95 %-100 %] 99 %  BP: ()/() 143/79   See Nursing Charting For Additional Vitals      Pre-op Assessment    I have reviewed the Patient Summary Reports.     I have reviewed the Nursing Notes. I have reviewed the NPO Status.   I have reviewed the Medications.     Review of Systems  Anesthesia Hx:  No previous Anesthesia                Social:  Non-Smoker, No Alcohol Use       Hematology/Oncology:                   Hematology Comments: Septic shock                    Cardiovascular:  Cardiovascular Normal Exercise tolerance: good                 ECG has been reviewed.                          Pulmonary:         Patient on continuous BiPap               Renal/:  Chronic Renal Disease   Pt now with renal insufficiency -will have trialysis catheter placed by surgery in OR             Hepatic/GI:  Hepatic/GI Normal                 Musculoskeletal:     Fourniers Gangrene            Neurological:  Neurology Normal                                      Endocrine:  Diabetes, poorly controlled   DKA  Recent diagnosis of DM      Morbid Obesity / BMI > 40      Physical Exam  General: Well nourished and Cooperative    Airway:  Mallampati: III   Mouth Opening: Small, but > 3cm  TM Distance: Normal    Dental:  Dentures    Chest/Lungs:  Clear to auscultation, Normal  Respiratory Rate    Heart:  Rate: Normal  Rhythm: Regular Rhythm        Anesthesia Plan  Type of Anesthesia, risks & benefits discussed:    Anesthesia Type: Gen ETT  Intra-op Monitoring Plan: Standard ASA Monitors and Art Line  Post Op Pain Control Plan: multimodal analgesia and IV/PO Opioids PRN  Induction:  IV and rapid sequence  Airway Plan: Direct and Video, Post-Induction  Informed Consent: Informed consent signed with the Patient representative and all parties understand the risks and agree with anesthesia plan.  All questions answered. Patient consented to blood products? Yes  ASA Score: 4  Day of Surgery Review of History & Physical: H&P Update referred to the surgeon/provider.  Anesthesia Plan Notes:   GA with OETT, RSI  Standard ASA monitors  Recovery in ICU  PONV: 3    Discussed at length with patient's daughter about care of patient.    Ready For Surgery From Anesthesia Perspective.     .

## 2024-01-31 NOTE — HPI
A 53-year-old woman with morbid obesity as evidenced by a BMI of 50 3 (documented as 58 at [facility]) who originally presented to [facility] with a wound to her posterior thigh, vomiting, and diarrhea for 3-4 days prior to admission.  On evaluation in [facility] ED she was noted to be afebrile tachycardic, and hypertensive.  Laboratory workup at that time revealed leukocytosis, elevated creatinine, elevated lactic acid, and elevated CRP.  Additionally it showed hyperglycemia with anion gap acidosis consistent with diabetic ketoacidosis.  CT imaging of the abdomen showed extensive soft tissue air throughout the right groin and inguinal region extending to the right lower quadrant of the anterior abdominal wall and medial aspect of the right thigh concerning for gas-forming infection.  She was admitted to hospital medicine service and taken to the OR by General surgery for debridement.  Empiric antibiotics with Zosyn, clindamycin, and vancomycin was started.  She underwent incision and drainage with debridement of the soft tissues down to the muscular fascia on 01/29.  She was then subsequently taken back to the OR on 01/31 where she underwent irrigation and debridement of the wound and insertion of a triple-lumen temporary hemodialysis catheter.  She was subsequently transitioned to meropenem and clindamycin while in [facility].  Unfortunately her hospital course was complicated by acute respiratory failure requiring intubation with mechanical ventilation and worsening ALVARADO prompting initiation of renal replacement therapy.  She was transferred to [facility] for higher level of care.  Upon transfer the patient's antibiotic therapy was deescalated to ceftriaxone.    Infectious disease is consulted for Antibiotic management: currently on mere, concern for nec fasciitis    Patient seen and evaluated by Infectious Diseases. 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 Taken to OR on 2/2 for debridement of the right buttocks and groin (#3). Evaluated by Neurology on 2/3 for encephalopathy. CT head with remote infarcts. Not withdrawing to pain on the morning of 2/5.     MRI done on 2/6 with embolic infarcts. S/P wound VAC exchange on 2/6. MRI brain revealed multiple embolic infarcts. Wound VAC exchange with debridement of fibrinous tissue, wound approximation and wound VAC placement on 2/9. Intraoperative JAY on 2/9 without cardiac abnormalities per EMR review. Recommendations for at least 2 weeks of antibiotic therapy from debridement on 2/9 given. Long term antibiotic therapy for concern for septic embolization not recommended due to negative JAY, Modified Berg criteria not meeting definition for probable or possible endocarditis, and negative blood cultures decision.    Unfortunately, the patients hospital course was further complicated by fever and leukocytosis on Hospital Day 16 and 18 prompting primary service for performed urine studies. 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      Infectious Diseases consulted for "Positive UA with uncommon bacteria"            "

## 2024-01-31 NOTE — ASSESSMENT & PLAN NOTE
CT abdomen and pelvis showed extensive soft tissue air throughout the right groin and inguinal region and extending to involve the right lower quadrant anterior abdominal wall with extensive soft tissue air also seen involving the proximal medial aspect of the right thigh, concerning for gas-forming infection.    S/p OR for debridement on 1/29      Continue vancomycin, merem, clindamycin  Culture growing proteus,consulted ID.  Will have another debridement today.

## 2024-01-31 NOTE — HOSPITAL COURSE
P53-year-old female with a past medical history of morbid obesity who presents with vomiting.DKA and right groin  ros gangrene,  Per CT,  Extensive soft tissue air throughout the right groin and inguinal region and extending to involve the right lower quadrant anterior abdominal wall with extensive soft tissue air also seen involving the proximal medial aspect of the right thigh.  This is most concerning for gas-forming infection.   S/P urgent debridement by surgery,on broad spectrum IV Abx,cultures are pending.will have another debridement today,culture growing proteus,consulted ID.on Merem and vanc at this time.  S/P second debridement on 1.31.24  Has elevated Cr. No prior record for comparison,on IVF and nephrology is consulted.worsening likely ATN,has been  started on HD.  Replaced phosphorous,  DKA with insulin gtt is improving.on SSI insulin.  Surgery,called for transfer to SICU in [facility],called transfer center,no available bed at this time.

## 2024-01-31 NOTE — NURSING
[facility], [facility]  Nurses Note -- 4 Eyes      1/30/2024       Skin assessed on: Q Shift      [x] No Pressure Injuries Present    [x]Prevention Measures Documented    [] Yes LDA  for Pressure Injury Previously documented     [] Yes New Pressure Injury Discovered   [] LDA for New Pressure Injury Added      Attending RN:  Suma Moran RN     Second RN:  ELISABETH Hull

## 2024-01-31 NOTE — PLAN OF CARE
Pt remains in ICU. Plans on surgery today. Pt extremely somnolent, withdraws from painful stimuli but no eye or verbal response. Notified MD Sam. VBG ordered and placed pt on BiPAP due to WOB. Titrated off insulin gtt. US of kidneys done (see results). Kidney function worsening. Total of 50 cc UOP via meza the entire shift even after 80 mg of lasix. CHG bath given. No new falls, injuries, or skin breakdown. Daughter and pt boyfriend updated on POC. Boyfriend at bedside.     Problem: Bariatric Environmental Safety  Goal: Safety Maintained with Care  Outcome: Ongoing, Progressing     Problem: Adjustment to Illness (Sepsis/Septic Shock)  Goal: Optimal Coping  Outcome: Ongoing, Progressing     Problem: Infection Progression (Sepsis/Septic Shock)  Goal: Absence of Infection Signs and Symptoms  Outcome: Ongoing, Progressing     Problem: Adult Inpatient Plan of Care  Goal: Plan of Care Review  Outcome: Ongoing, Progressing  Goal: Patient-Specific Goal (Individualized)  Outcome: Ongoing, Progressing  Goal: Absence of Hospital-Acquired Illness or Injury  Outcome: Ongoing, Progressing  Goal: Optimal Comfort and Wellbeing  Outcome: Ongoing, Progressing  Goal: Readiness for Transition of Care  Outcome: Ongoing, Progressing     Step 1 - Admission - Current (PATHWAY - IP DIABETIC KETOACIDOSIS (DKA/HHS) - [facility])  MD: Glucose less than 200; MD should discontinue insulin drip  Outcome: Met  MD: POTASSIUM BETWEEN 3.3 AND 5.5  Outcome: Met  RN: Fluids switched when glucose level is 200 mg/dL for DKA and 300 mg/dL for HHS.  Outcome: Met  RN:  BMP drawn Q2-Q6  Outcome: Met  RN: A1C drawn  Outcome: Met

## 2024-01-31 NOTE — CONSULTS
"[facility]  Infectious Disease  Consult Note    Patient Name: Sarah Saravia  MRN: [id number]  Admission Date: 1/29/2024  Hospital Length of Stay: 2 days  Attending Physician: Carlos Acuna, *  [facility]     Isolation Status: No active isolations    Patient information was obtained from patient and ER records.      Inpatient consult to Infectious Diseases  Consult performed by: Yoanna iMchael MD  Consult ordered by: Carlos Acuna MD        Assessment/Plan:     Renal/  * Ayaz's gangrene  53F with h/o obesity (BMI 53) admitted with N/V and R groin wound found to be in DKA.  CT abdomen and pelvis showed extensive soft tissue air throughout the right groin and inguinal region and extending to involve the right lower quadrant anterior abdominal wall with extensive soft tissue air also seen involving the proximal medial aspect of the right thigh, concerning for gas-forming infection.  s/p OR debridement for nec fascitis. R groin tissue with proteus, susceptibilities pending.  On clindamycin, meropenem. ID consulted for "Abx management     Treating nec fascitis. Appreciate sgy help with source control. Suspect polymicrobial infection.     Recommendations:   - continue meropenem for now, renally dosed.  Consider stopping clindamycin  - f/u cultures  - defer to sgy need for additional debridment.         Thank you for your consult. I will follow-up with patient. Please contact us if you have any additional questions.    Yoanna Michael MD  Infectious Disease  [facility] - Intensive Care    Subjective:     Principal Problem: Ayaz's gangrene    HPI: 53F with h/o obesity (BMI 53) admitted with N/V found to be in DKA. Pt currently intubated and sedated. No family at bedside. Per h&p, she had been complaining of wound to R groin without drainage.       CT abdomen and pelvis showed extensive soft tissue air throughout the right groin " "and inguinal region and extending to involve the right lower quadrant anterior abdominal wall with extensive soft tissue air also seen involving the proximal medial aspect of the right thigh, concerning for gas-forming infection.  General surgery was consulted and the patient was taken to the OR for debridement.      Culture of R groin tissue growing:  Component 1 d ago   Aerobic Bacterial Culture      Abnormal   PRESUMPTIVE PROTEUS SPECIES  Moderate  Identification and susceptibility pending           On clindamycin, meropenem    ID consulted for "Abx management     History reviewed. No pertinent past medical history.    Past Surgical History:   Procedure Laterality Date    INCISION AND DRAINAGE OF PERIRECTAL REGION N/A 1/29/2024    Procedure: INCISION AND DRAINAGE, PERIRECTAL REGION;  Surgeon: Axel Ramsay MD;  Location: [facility];  Service: General;  Laterality: N/A;       Review of patient's allergies indicates:  No Known Allergies    No current facility-administered medications on file prior to encounter.     No current outpatient medications on file prior to encounter.     Family History    None       Tobacco Use    Smoking status: Not on file    Smokeless tobacco: Not on file   Substance and Sexual Activity    Alcohol use: Not on file    Drug use: Not on file    Sexual activity: Not on file     Review of Systems   Unable to perform ROS: Mental status change   (Post surgery, likely related to sedation)  Objective:     Vital Signs (Most Recent):  Temp: 97.9 °F (36.6 °C) (01/31/24 1415)  Pulse: 107 (01/31/24 1415)  Resp: (!) 27 (01/31/24 1415)  BP: 123/61 (01/31/24 1415)  SpO2: 98 % (01/31/24 1415) Vital Signs (24h Range):  Temp:  [97.5 °F (36.4 °C)-102 °F (38.9 °C)] 97.9 °F (36.6 °C)  Pulse:  [] 107  Resp:  [22-33] 27  SpO2:  [90 %-100 %] 98 %  BP: ()/() 123/61  Arterial Line BP: (174-211)/(66-76) 174/66     Weight: (!) 150.1 kg (330 lb 14.6 oz)  Body mass index is 53.41 kg/m².   "   Physical Exam  Vitals and nursing note reviewed.   Constitutional:       General: She is not in acute distress.     Appearance: Normal appearance. She is obese. She is not ill-appearing.   HENT:      Head: Normocephalic and atraumatic.      Nose: Nose normal.      Mouth/Throat:      Mouth: Mucous membranes are moist.   Eyes:      Extraocular Movements: Extraocular movements intact.   Cardiovascular:      Rate and Rhythm: Tachycardia present.      Pulses: Normal pulses.      Heart sounds: No murmur heard.  Pulmonary:      Effort: Pulmonary effort is normal. No respiratory distress.   Abdominal:      General: Abdomen is flat.      Palpations: Abdomen is soft.      Tenderness: There is no abdominal tenderness.   Musculoskeletal:      Right lower leg: No edema.      Left lower leg: No edema.      Comments: Right groin wound, packed    Skin:     General: Skin is warm.      Capillary Refill: Capillary refill takes less than 2 seconds.   Neurological:      Mental Status: She is alert.      Comments: A&Ox2 (post anesthesia)                 Significant Labs: All pertinent labs within the past 24 hours have been reviewed.    Significant Imaging: I have reviewed all pertinent imaging results/findings within the past 24 hours.

## 2024-02-01 ENCOUNTER — ANESTHESIA EVENT (OUTPATIENT)
Dept: SURGERY | Facility: HOSPITAL | Age: 54
DRG: 004 | End: 2024-02-01
Payer: MEDICAID

## 2024-02-01 LAB
ALBUMIN SERPL BCP-MCNC: 1.6 G/DL (ref 3.5–5.2)
ALP SERPL-CCNC: 147 U/L (ref 55–135)
ALT SERPL W/O P-5'-P-CCNC: 22 U/L (ref 10–44)
ANION GAP SERPL CALC-SCNC: 13 MMOL/L (ref 8–16)
ANION GAP SERPL CALC-SCNC: 15 MMOL/L (ref 8–16)
ANION GAP SERPL CALC-SCNC: 16 MMOL/L (ref 8–16)
ANION GAP SERPL CALC-SCNC: 17 MMOL/L (ref 8–16)
APTT PPP: 27.8 SEC (ref 21–32)
AST SERPL-CCNC: 35 U/L (ref 10–40)
BASOPHILS # BLD AUTO: 0.13 K/UL (ref 0–0.2)
BASOPHILS NFR BLD: 0.7 % (ref 0–1.9)
BILIRUB SERPL-MCNC: 0.5 MG/DL (ref 0.1–1)
BUN SERPL-MCNC: 22 MG/DL (ref 6–20)
BUN SERPL-MCNC: 25 MG/DL (ref 6–20)
BUN SERPL-MCNC: 35 MG/DL (ref 6–20)
BUN SERPL-MCNC: 40 MG/DL (ref 6–20)
CALCIUM SERPL-MCNC: 7.7 MG/DL (ref 8.7–10.5)
CALCIUM SERPL-MCNC: 7.7 MG/DL (ref 8.7–10.5)
CALCIUM SERPL-MCNC: 8.2 MG/DL (ref 8.7–10.5)
CALCIUM SERPL-MCNC: 8.5 MG/DL (ref 8.7–10.5)
CHLORIDE SERPL-SCNC: 98 MMOL/L (ref 95–110)
CHLORIDE SERPL-SCNC: 99 MMOL/L (ref 95–110)
CO2 SERPL-SCNC: 22 MMOL/L (ref 23–29)
CO2 SERPL-SCNC: 23 MMOL/L (ref 23–29)
CO2 SERPL-SCNC: 23 MMOL/L (ref 23–29)
CO2 SERPL-SCNC: 27 MMOL/L (ref 23–29)
CREAT SERPL-MCNC: 3.2 MG/DL (ref 0.5–1.4)
CREAT SERPL-MCNC: 3.5 MG/DL (ref 0.5–1.4)
CREAT SERPL-MCNC: 4 MG/DL (ref 0.5–1.4)
CREAT SERPL-MCNC: 4.9 MG/DL (ref 0.5–1.4)
DIFFERENTIAL METHOD BLD: ABNORMAL
EOSINOPHIL # BLD AUTO: 0.1 K/UL (ref 0–0.5)
EOSINOPHIL NFR BLD: 0.4 % (ref 0–8)
ERYTHROCYTE [DISTWIDTH] IN BLOOD BY AUTOMATED COUNT: 14 % (ref 11.5–14.5)
EST. GFR  (NO RACE VARIABLE): 10 ML/MIN/1.73 M^2
EST. GFR  (NO RACE VARIABLE): 13 ML/MIN/1.73 M^2
EST. GFR  (NO RACE VARIABLE): 15 ML/MIN/1.73 M^2
EST. GFR  (NO RACE VARIABLE): 17 ML/MIN/1.73 M^2
GLUCOSE SERPL-MCNC: 172 MG/DL (ref 70–110)
GLUCOSE SERPL-MCNC: 197 MG/DL (ref 70–110)
GLUCOSE SERPL-MCNC: 236 MG/DL (ref 70–110)
GLUCOSE SERPL-MCNC: 266 MG/DL (ref 70–110)
HAV IGM SERPL QL IA: NORMAL
HBV CORE AB SERPL QL IA: NORMAL
HBV SURFACE AB SER-ACNC: <3 MIU/ML
HBV SURFACE AB SER-ACNC: NORMAL M[IU]/ML
HBV SURFACE AG SERPL QL IA: NORMAL
HCT VFR BLD AUTO: 26.1 % (ref 37–48.5)
HGB BLD-MCNC: 9 G/DL (ref 12–16)
HIV 1+2 AB+HIV1 P24 AG SERPL QL IA: NORMAL
IMM GRANULOCYTES # BLD AUTO: 0.58 K/UL (ref 0–0.04)
IMM GRANULOCYTES NFR BLD AUTO: 3.2 % (ref 0–0.5)
INR PPP: 1 (ref 0.8–1.2)
LACTATE SERPL-SCNC: 1.5 MMOL/L (ref 0.5–2.2)
LYMPHOCYTES # BLD AUTO: 1.8 K/UL (ref 1–4.8)
LYMPHOCYTES NFR BLD: 9.6 % (ref 18–48)
MAGNESIUM SERPL-MCNC: 1.8 MG/DL (ref 1.6–2.6)
MCH RBC QN AUTO: 26.3 PG (ref 27–31)
MCHC RBC AUTO-ENTMCNC: 34.5 G/DL (ref 32–36)
MCV RBC AUTO: 76 FL (ref 82–98)
MONOCYTES # BLD AUTO: 0.8 K/UL (ref 0.3–1)
MONOCYTES NFR BLD: 4.1 % (ref 4–15)
NEUTROPHILS # BLD AUTO: 14.9 K/UL (ref 1.8–7.7)
NEUTROPHILS NFR BLD: 82 % (ref 38–73)
NRBC BLD-RTO: 0 /100 WBC
PHOSPHATE SERPL-MCNC: 3.5 MG/DL (ref 2.7–4.5)
PHOSPHATE SERPL-MCNC: 4.1 MG/DL (ref 2.7–4.5)
PLATELET # BLD AUTO: 194 K/UL (ref 150–450)
PMV BLD AUTO: 11.4 FL (ref 9.2–12.9)
POCT GLUCOSE: 155 MG/DL (ref 70–110)
POCT GLUCOSE: 172 MG/DL (ref 70–110)
POCT GLUCOSE: 189 MG/DL (ref 70–110)
POCT GLUCOSE: 265 MG/DL (ref 70–110)
POCT GLUCOSE: 278 MG/DL (ref 70–110)
POCT GLUCOSE: 282 MG/DL (ref 70–110)
POTASSIUM SERPL-SCNC: 3.4 MMOL/L (ref 3.5–5.1)
POTASSIUM SERPL-SCNC: 3.5 MMOL/L (ref 3.5–5.1)
PROT SERPL-MCNC: 6.8 G/DL (ref 6–8.4)
PROTHROMBIN TIME: 11.1 SEC (ref 9–12.5)
RBC # BLD AUTO: 3.42 M/UL (ref 4–5.4)
SODIUM SERPL-SCNC: 136 MMOL/L (ref 136–145)
SODIUM SERPL-SCNC: 137 MMOL/L (ref 136–145)
SODIUM SERPL-SCNC: 138 MMOL/L (ref 136–145)
SODIUM SERPL-SCNC: 138 MMOL/L (ref 136–145)
VANCOMYCIN SERPL-MCNC: 19.1 UG/ML
WBC # BLD AUTO: 18.15 K/UL (ref 3.9–12.7)

## 2024-02-01 PROCEDURE — 85025 COMPLETE CBC W/AUTO DIFF WBC: CPT | Performed by: STUDENT IN AN ORGANIZED HEALTH CARE EDUCATION/TRAINING PROGRAM

## 2024-02-01 PROCEDURE — 99900026 HC AIRWAY MAINTENANCE (STAT)

## 2024-02-01 PROCEDURE — 99291 CRITICAL CARE FIRST HOUR: CPT | Mod: 24,,, | Performed by: STUDENT IN AN ORGANIZED HEALTH CARE EDUCATION/TRAINING PROGRAM

## 2024-02-01 PROCEDURE — 80100014 HC HEMODIALYSIS 1:1

## 2024-02-01 PROCEDURE — 63600175 PHARM REV CODE 636 W HCPCS: Performed by: INTERNAL MEDICINE

## 2024-02-01 PROCEDURE — 82803 BLOOD GASES ANY COMBINATION: CPT

## 2024-02-01 PROCEDURE — 25000003 PHARM REV CODE 250: Performed by: INTERNAL MEDICINE

## 2024-02-01 PROCEDURE — 86850 RBC ANTIBODY SCREEN: CPT

## 2024-02-01 PROCEDURE — 80202 ASSAY OF VANCOMYCIN: CPT | Performed by: HOSPITALIST

## 2024-02-01 PROCEDURE — 80053 COMPREHEN METABOLIC PANEL: CPT

## 2024-02-01 PROCEDURE — 80048 BASIC METABOLIC PNL TOTAL CA: CPT | Mod: XB | Performed by: HOSPITALIST

## 2024-02-01 PROCEDURE — 25000003 PHARM REV CODE 250: Mod: JZ,JG | Performed by: SURGERY

## 2024-02-01 PROCEDURE — 85027 COMPLETE CBC AUTOMATED: CPT

## 2024-02-01 PROCEDURE — 25000003 PHARM REV CODE 250: Performed by: HOSPITALIST

## 2024-02-01 PROCEDURE — 87389 HIV-1 AG W/HIV-1&-2 AB AG IA: CPT | Performed by: INTERNAL MEDICINE

## 2024-02-01 PROCEDURE — 27200966 HC CLOSED SUCTION SYSTEM

## 2024-02-01 PROCEDURE — 27100171 HC OXYGEN HIGH FLOW UP TO 24 HOURS

## 2024-02-01 PROCEDURE — 99900035 HC TECH TIME PER 15 MIN (STAT)

## 2024-02-01 PROCEDURE — 20000000 HC ICU ROOM

## 2024-02-01 PROCEDURE — 84100 ASSAY OF PHOSPHORUS: CPT | Performed by: STUDENT IN AN ORGANIZED HEALTH CARE EDUCATION/TRAINING PROGRAM

## 2024-02-01 PROCEDURE — 63600175 PHARM REV CODE 636 W HCPCS: Performed by: HOSPITALIST

## 2024-02-01 PROCEDURE — 84100 ASSAY OF PHOSPHORUS: CPT | Mod: 91

## 2024-02-01 PROCEDURE — 99233 SBSQ HOSP IP/OBS HIGH 50: CPT | Mod: ,,, | Performed by: INTERNAL MEDICINE

## 2024-02-01 PROCEDURE — 85007 BL SMEAR W/DIFF WBC COUNT: CPT

## 2024-02-01 PROCEDURE — 85730 THROMBOPLASTIN TIME PARTIAL: CPT

## 2024-02-01 PROCEDURE — 63600175 PHARM REV CODE 636 W HCPCS: Performed by: STUDENT IN AN ORGANIZED HEALTH CARE EDUCATION/TRAINING PROGRAM

## 2024-02-01 PROCEDURE — 85610 PROTHROMBIN TIME: CPT

## 2024-02-01 PROCEDURE — 25000003 PHARM REV CODE 250: Performed by: STUDENT IN AN ORGANIZED HEALTH CARE EDUCATION/TRAINING PROGRAM

## 2024-02-01 PROCEDURE — 94003 VENT MGMT INPAT SUBQ DAY: CPT

## 2024-02-01 PROCEDURE — 83605 ASSAY OF LACTIC ACID: CPT

## 2024-02-01 PROCEDURE — 94761 N-INVAS EAR/PLS OXIMETRY MLT: CPT | Mod: XB

## 2024-02-01 PROCEDURE — 80048 BASIC METABOLIC PNL TOTAL CA: CPT | Mod: 91,XB | Performed by: HOSPITALIST

## 2024-02-01 PROCEDURE — 37799 UNLISTED PX VASCULAR SURGERY: CPT

## 2024-02-01 PROCEDURE — 83735 ASSAY OF MAGNESIUM: CPT

## 2024-02-01 PROCEDURE — 99291 CRITICAL CARE FIRST HOUR: CPT | Mod: ,,, | Performed by: INTERNAL MEDICINE

## 2024-02-01 RX ORDER — HYDRALAZINE HYDROCHLORIDE 20 MG/ML
10 INJECTION INTRAMUSCULAR; INTRAVENOUS EVERY 4 HOURS PRN
Status: DISCONTINUED | OUTPATIENT
Start: 2024-02-01 | End: 2024-02-26

## 2024-02-01 RX ORDER — CALCIUM GLUCONATE 20 MG/ML
1 INJECTION, SOLUTION INTRAVENOUS
Status: COMPLETED | OUTPATIENT
Start: 2024-02-01 | End: 2024-02-01

## 2024-02-01 RX ADMIN — CALCIUM GLUCONATE 1 G: 20 INJECTION, SOLUTION INTRAVENOUS at 10:02

## 2024-02-01 RX ADMIN — CHLORHEXIDINE GLUCONATE 0.12% ORAL RINSE 15 ML: 1.2 LIQUID ORAL at 09:02

## 2024-02-01 RX ADMIN — VANCOMYCIN HYDROCHLORIDE 500 MG: 500 INJECTION, POWDER, LYOPHILIZED, FOR SOLUTION INTRAVENOUS at 08:02

## 2024-02-01 RX ADMIN — PROPOFOL 15 MCG/KG/MIN: 10 INJECTION, EMULSION INTRAVENOUS at 03:02

## 2024-02-01 RX ADMIN — CALCIUM GLUCONATE 1 G: 20 INJECTION, SOLUTION INTRAVENOUS at 09:02

## 2024-02-01 RX ADMIN — PROPOFOL 25 MCG/KG/MIN: 10 INJECTION, EMULSION INTRAVENOUS at 02:02

## 2024-02-01 RX ADMIN — HEPARIN SODIUM 5000 UNITS: 5000 INJECTION INTRAVENOUS; SUBCUTANEOUS at 06:02

## 2024-02-01 RX ADMIN — PROPOFOL 25 MCG/KG/MIN: 10 INJECTION, EMULSION INTRAVENOUS at 06:02

## 2024-02-01 RX ADMIN — MUPIROCIN: 20 OINTMENT TOPICAL at 09:02

## 2024-02-01 RX ADMIN — CHLORHEXIDINE GLUCONATE 0.12% ORAL RINSE 15 ML: 1.2 LIQUID ORAL at 08:02

## 2024-02-01 RX ADMIN — INSULIN ASPART 3 UNITS: 100 INJECTION, SOLUTION INTRAVENOUS; SUBCUTANEOUS at 08:02

## 2024-02-01 RX ADMIN — INSULIN ASPART 3 UNITS: 100 INJECTION, SOLUTION INTRAVENOUS; SUBCUTANEOUS at 12:02

## 2024-02-01 RX ADMIN — MUPIROCIN: 20 OINTMENT TOPICAL at 08:02

## 2024-02-01 RX ADMIN — MEROPENEM 1 G: 1 INJECTION, POWDER, FOR SOLUTION INTRAVENOUS at 09:02

## 2024-02-01 RX ADMIN — HEPARIN SODIUM 5000 UNITS: 5000 INJECTION INTRAVENOUS; SUBCUTANEOUS at 02:02

## 2024-02-01 RX ADMIN — HEPARIN SODIUM 5000 UNITS: 5000 INJECTION INTRAVENOUS; SUBCUTANEOUS at 09:02

## 2024-02-01 RX ADMIN — FAMOTIDINE 20 MG: 10 INJECTION, SOLUTION INTRAVENOUS at 09:02

## 2024-02-01 RX ADMIN — PROPOFOL 20 MCG/KG/MIN: 10 INJECTION, EMULSION INTRAVENOUS at 09:02

## 2024-02-01 NOTE — PLAN OF CARE
Case Management Assessment     PCP: Sarah Saravia  [facility]acy: [facility]    Patient Arrived From: home  Existing Help at Home: Osvaldo ACOSTA [phone]    Barriers to Discharge: no insurance    Discharge Plan:    A. tbd   B. tbd      This patient has been screened for Case Management needs.  Treatment is ongoing in the ICU at this time. Aleks Jackson assisted to complete DC needs assessment.  Per daughter, patient normally independent, drives and is employed.  CM will continue to follow while in the ICU and assist with DC planning as needed.        02/01/24 1331   Discharge Assessment   Assessment Type Discharge Planning Assessment   Confirmed/corrected address, phone number and insurance Yes   Confirmed Demographics Correct on Facesheet   Source of Information family   If unable to respond/provide information was family/caregiver contacted? Yes   Contact Name/Number Aleks jackson Duran   Communicated ZEKE with patient/caregiver Date not available/Unable to determine   People in Home significant other   Do you expect to return to your current living situation? Yes   Do you have help at home or someone to help you manage your care at home? Yes   Who are your caregiver(s) and their phone number(s)? [facility] Duran, dgt   Prior to hospitilization cognitive status: Alert/Oriented   Current cognitive status: Coma/Sedated/Intubated   Home Layout Able to live on 1st floor   Equipment Currently Used at Home none   Readmission within 30 days? No   Patient currently being followed by outpatient case management? No   Do you currently have service(s) that help you manage your care at home? No   Do you take prescription medications? No   Do you have prescription coverage? No   Coverage none   Do you have any problems affording any of your prescribed medications? TBD   Who is going to help you get home at discharge? Sarah jackson   How do you get to doctors appointments? car, drives self   Are you on dialysis? No    Do you take coumadin? No   Discharge Plan A Home with family   Discharge Plan B   (tbd)   DME Needed Upon Discharge    (tbd)   Transition of Care Barriers None

## 2024-02-01 NOTE — PROGRESS NOTES
Date of Admission:1/29/2024    SUBJECTIVE:unresponsive  Off insulin gtt    Current Facility-Administered Medications   Medication    0.9%  NaCl infusion    0.9%  NaCl infusion    0.9%  NaCl infusion    0.9%  NaCl infusion    acetaminophen suppository 650 mg    acetaminophen tablet 650 mg    albuterol-ipratropium 2.5 mg-0.5 mg/3 mL nebulizer solution 3 mL    chlorhexidine 0.12 % solution 15 mL    dextrose 10 % infusion    dextrose 10 % infusion    dextrose 10% bolus 125 mL 125 mL    dextrose 10% bolus 125 mL 125 mL    dextrose 10% bolus 250 mL 250 mL    dextrose 10% bolus 250 mL 250 mL    famotidine (PF) injection 20 mg    fentaNYL 2500 mcg in 0.9% sodium chloride 250 mL infusion premix (titrating)    fentanyl IV bolus from bag/infusion 50 mcg    glucagon (human recombinant) injection 1 mg    glucose chewable tablet 16 g    glucose chewable tablet 24 g    heparin (porcine) injection 5,000 Units    hydrALAZINE injection 10 mg    insulin aspart U-100 pen 0-5 Units    insulin detemir U-100 (Levemir) pen 10 Units    melatonin tablet 6 mg    meropenem (MERREM) 1 g in sodium chloride 0.9 % 100 mL IVPB (MB+)    mupirocin 2 % ointment    naloxone 0.4 mg/mL injection 0.02 mg    ondansetron injection 4 mg    potassium chloride 10 mEq in 100 mL IVPB    And    potassium chloride 10 mEq in 100 mL IVPB    And    potassium chloride 10 mEq in 100 mL IVPB    prochlorperazine injection Soln 5 mg    propofol (DIPRIVAN) 10 mg/mL infusion    sodium bicarbonate 150 mEq in dextrose 5 % (D5W) 1,000 mL infusion    sodium chloride 0.9% bolus 250 mL 250 mL    sodium chloride 0.9% bolus 250 mL 250 mL    sodium chloride 0.9% flush 10 mL    vancomycin - pharmacy to dose       Wt Readings from Last 3 Encounters:   02/01/24 (!) 149.7 kg (330 lb)     Temp Readings from Last 3 Encounters:   02/01/24 98.8 °F (37.1 °C)     BP Readings from Last 3 Encounters:   02/01/24 (!) 155/69     Pulse Readings from Last 3 Encounters:   02/01/24 101        Intake/Output Summary (Last 24 hours) at 2/1/2024 1117  Last data filed at 2/1/2024 1041  Gross per 24 hour   Intake 1817 ml   Output 1740 ml   Net 77 ml       PE:  GEN:wd female in nad  HEENT:ncat,eyes closed,mm +ett  CVS:s1s2 tachy  PULM:no rales  ABD:soft  EXT:no thigh edema  NEURO:sedated    Recent Labs   Lab 02/01/24  0855   *      K 3.5   CL 98   CO2 23   BUN 40*   CREATININE 4.9*   CALCIUM 7.7*       Lab Results   Component Value Date    CALCIUM 7.7 (L) 02/01/2024    PHOS 4.1 02/01/2024       Recent Labs   Lab 02/01/24  0322   WBC 18.15*   RBC 3.42*   HGB 9.0*   HCT 26.1*      MCV 76*   MCH 26.3*   MCHC 34.5           A/P:  1.vishal.   creatinine is not good. Hd today.  Bp decent.  2.anemia. 2nd to loss. No prbc indicated today.  3.lactic acidosis. Bicarb better.  4.dka. resolved. Dm. Following sugars. Sugars high.  5.acute resp failure. Cont vent support.  6.ros's gangrene. Wash out again pending.  Discussed with pulm and hospitalist. Updated by rn.  Very ill still wbc still up. Cont renal dose antibiotics.  34mins critical care time.

## 2024-02-01 NOTE — ASSESSMENT & PLAN NOTE
Surgery following.  To OR on 1/30 and 1/31.  Not on pressors at this time.  Proteus growing on preliminary cultures with susceptibilities showing resistance to minocycline.  ID following.  Continue meropenem/vanc.  Defer to ID judgement.  - wound care  - further intervention per surgery.  To OR tomorrow

## 2024-02-01 NOTE — SUBJECTIVE & OBJECTIVE
Interval history: NAEO. Getting HD (second session). Remains on vent. Awiating transfer to main campus for SGY    Past Surgical History:   Procedure Laterality Date    INCISION AND DRAINAGE OF PERIRECTAL REGION N/A 1/29/2024    Procedure: INCISION AND DRAINAGE, PERIRECTAL REGION;  Surgeon: Axel Ramsay MD;  Location: [facility] OR;  Service: General;  Laterality: N/A;    PLACEMENT, TRIALYSIS CATH Right 1/31/2024    Procedure: INSERTION, CATHETER, TRIPLE LUMEN, HEMODIALYSIS, TEMPORARY;  Surgeon: Alvin Junior MD;  Location: [facility] OR;  Service: General;  Laterality: Right;    WOUND EXPLORATION Right 1/31/2024    Procedure: IRRIGATION & DEBRIDEMENT, WOUND DEBRIDEMENT;  Surgeon: Alvin Junior MD;  Location: [facility] OR;  Service: General;  Laterality: Right;       Review of patient's allergies indicates:  No Known Allergies    No current facility-administered medications on file prior to encounter.     No current outpatient medications on file prior to encounter.     Family History    None       Tobacco Use    Smoking status: Not on file    Smokeless tobacco: Not on file   Substance and Sexual Activity    Alcohol use: Not on file    Drug use: Not on file    Sexual activity: Not on file     Review of Systems   Unable to perform ROS: Mental status change   (Post surgery, likely related to sedation)  Objective:     Vital Signs (Most Recent):  Temp: 99.3 °F (37.4 °C) (02/01/24 1515)  Pulse: 95 (02/01/24 1515)  Resp: (!) 22 (02/01/24 1515)  BP: (!) 186/80 (02/01/24 1500)  SpO2: 97 % (02/01/24 1515) Vital Signs (24h Range):  Temp:  [97.7 °F (36.5 °C)-100.9 °F (38.3 °C)] 99.3 °F (37.4 °C)  Pulse:  [] 95  Resp:  [8-37] 22  SpO2:  [92 %-99 %] 97 %  BP: (143-186)/(65-85) 186/80  Arterial Line BP: (139-200)/(51-81) 178/69     Weight: (!) 149.7 kg (330 lb)  Body mass index is 53.26 kg/m².     Physical Exam  Vitals and nursing note reviewed.   Constitutional:       General: She is not in acute distress.     Appearance: Normal  appearance. She is obese. She is not ill-appearing.   HENT:      Head: Normocephalic and atraumatic.      Nose: Nose normal.      Mouth/Throat:      Mouth: Mucous membranes are moist.   Eyes:      Extraocular Movements: Extraocular movements intact.   Cardiovascular:      Rate and Rhythm: Tachycardia present.      Pulses: Normal pulses.      Heart sounds: No murmur heard.  Pulmonary:      Effort: Pulmonary effort is normal. No respiratory distress.   Abdominal:      General: Abdomen is flat.      Palpations: Abdomen is soft.      Tenderness: There is no abdominal tenderness.   Musculoskeletal:      Right lower leg: No edema.      Left lower leg: No edema.      Comments: Right groin wound, packed    Skin:     General: Skin is warm.      Capillary Refill: Capillary refill takes less than 2 seconds.   Neurological:      Mental Status: She is alert.      Comments: A&Ox2 (post anesthesia)                 Significant Labs: All pertinent labs within the past 24 hours have been reviewed.    Significant Imaging: I have reviewed all pertinent imaging results/findings within the past 24 hours.

## 2024-02-01 NOTE — PLAN OF CARE
Recommendations    1. Initiate TF Novasource Renal @ 10 mL/hr advancing to 30 mL/hr providing 1440 kcal, 65 g pro and 516 mL water meeting 100% EEN/EPN.    2. Continue to monitor for TF initiation and tolerance.  3. Monitor weights and labs.    4. Collaboration with medical providers    Goals: 1. Pt TF initiated by RD follow up  Nutrition Goal Status: new  Communication of RD Recs:  (POC)    Assessment and Plan    Nutrition Problem  Inadequate oral intake    Related to (etiology):   Diagnosis related symptoms    Signs and Symptoms (as evidenced by):   Intubated  NPO    Interventions/Recommendations (treatment strategy):  Collaboration with medical providers    Nutrition Diagnosis Status:   New

## 2024-02-01 NOTE — ASSESSMENT & PLAN NOTE
Likely related to lactic acidosis an progressive renal dysfunction.  Nephrology performing RRT.  Surgery performing source control.    - RRT per nephrology.

## 2024-02-01 NOTE — ASSESSMENT & PLAN NOTE
Presented in DKA.  Now transitioned to subQ insulin regimen.  A1C 10.4.  newly diagnosed DM.  - continue SQ regimen with 12U detimir and ISS.  - may need to adjust this regimen depending on tube feeds tolerance.

## 2024-02-01 NOTE — ASSESSMENT & PLAN NOTE
This patient does have evidence of infective focus  My overall impression is sepsis.  Source: Skin and Soft Tissue (location groin)  Antibiotics given-   Antibiotics (72h ago, onward)    Start     Stop Route Frequency Ordered    02/01/24 0900  meropenem (MERREM) 1 g in sodium chloride 0.9 % 100 mL IVPB (MB+)         -- IV Daily 01/31/24 1114    01/30/24 0945  mupirocin 2 % ointment         02/04/24 0859 Nasl 2 times daily 01/30/24 0831    01/29/24 1944  vancomycin - pharmacy to dose  (vancomycin IVPB (PEDS and ADULTS))        See Hyperspace for full Linked Orders Report.    -- IV pharmacy to manage frequency 01/29/24 1846        Latest lactate reviewed-  Recent Labs   Lab 01/29/24  1950 01/30/24  0042 01/31/24  0100   LACTATE  --    < > 2.4*   POCLAC 5.64*  --   --     < > = values in this interval not displayed.       Organ dysfunction indicated by Acute kidney injury, Acute respiratory failure, and Encephalopathy    Fluid challenge Not needed - patient is not hypotensive  (already resuscitated on presentation)    Post- resuscitation assessment No - Post resuscitation assessment not needed       Will Not start Pressors- Levophed for MAP of 65  Source control achieved by: surgical resection and antibiotic management.

## 2024-02-01 NOTE — PROGRESS NOTES
[facility] Intensive Care  General Surgery  Progress Note    Subjective:     History of Present Illness:  53 year old morbidly obese female who does not routinely go to the doctor presents to the ED with malaise, nausea, vomiting, and groin, buttock, perineal swelling and tenderness. She began feeling ill a few days ago.     On arrival to the ED she is tachycardic to 120, hypertensive without fever. Labs demonstrate leukocytosis of 21, , with lactic acidosis and associated ALVARADO with cr 3.8, hyperglycemia with blood glucose of 824 accompanied by severe electrolyte derangements. CT imaging obtained demonstrates diffuse subcutaneous air along buttocks, perineum, anterior vulva, as well as bilateral thighs.     No prior abdominal surgeries. She denies problems with her heart or lungs. Non smoker. Does not routinely take any medications.    Post-Op Info:  Procedure(s) (LRB):  IRRIGATION & DEBRIDEMENT, WOUND DEBRIDEMENT (Right)  INSERTION, CATHETER, TRIPLE LUMEN, HEMODIALYSIS, TEMPORARY (Right)   1 Day Post-Op     Interval History: s/p takeback to OR yesterday with repeat debridement and trialysis line placement. Low grade fever with mild tachycardia since OR. Has not had sedation paused since OR for neuro exam, initially without reflexes however has cough this am, has been intermittently overbreathing ventilator. Hypertensive, sedation added for this by pulm team.   HD overnight with 1.5 L fluid pulled; remains acidotic this am. Hyperglycemic to the 300s.     Medications:  Continuous Infusions:   fentanyl 37.5 mcg/hr (01/31/24 5475)    propofoL 25 mcg/kg/min (02/01/24 0622)    sodium bicarbonate 150 mEq in dextrose 5 % (D5W) 1,000 mL infusion Stopped (01/31/24 1712)     Scheduled Meds:   sodium chloride 0.9%   Intravenous Once    sodium chloride 0.9%   Intravenous Once    chlorhexidine  15 mL Mouth/Throat BID    famotidine (PF)  20 mg Intravenous Daily    heparin (porcine)  5,000 Units Subcutaneous Q8H    insulin  detemir U-100  10 Units Subcutaneous QHS    meropenem (MERREM) IVPB  1 g Intravenous Daily    mupirocin   Nasal BID     PRN Meds:sodium chloride 0.9%, sodium chloride 0.9%, acetaminophen, acetaminophen, albuterol-ipratropium, dextrose 10 % in water (D10W), dextrose 10 % in water (D10W), dextrose 10%, dextrose 10%, dextrose 10%, dextrose 10%, fentanyl, glucagon (human recombinant), glucose, glucose, insulin aspart U-100, melatonin, naloxone, ondansetron, potassium chloride **AND** potassium chloride **AND** potassium chloride, prochlorperazine, sodium chloride 0.9%, sodium chloride 0.9%, sodium chloride 0.9%, Pharmacy to dose Vancomycin consult **AND** vancomycin - pharmacy to dose     Review of patient's allergies indicates:  No Known Allergies  Objective:     Vital Signs (Most Recent):  Temp: 100 °F (37.8 °C) (02/01/24 0645)  Pulse: 101 (02/01/24 0645)  Resp: (!) 25 (02/01/24 0645)  BP: (!) 177/79 (02/01/24 0600)  SpO2: 97 % (02/01/24 0645) Vital Signs (24h Range):  Temp:  [97.5 °F (36.4 °C)-100.9 °F (38.3 °C)] 100 °F (37.8 °C)  Pulse:  [] 101  Resp:  [20-37] 25  SpO2:  [90 %-100 %] 97 %  BP: (123-177)/(61-83) 177/79  Arterial Line BP: (138-211)/(51-81) 160/58     Weight: (!) 150.1 kg (330 lb 14.6 oz)  Body mass index is 53.41 kg/m².    Intake/Output - Last 3 Shifts         01/30 0700 01/31 0659 01/31 0700 02/01 0659 02/01 0700 02/02 0659    P.O. 0      I.V. (mL/kg) 2244.4 (15) 1372.7 (9.1)     Other  0     IV Piggyback 1483.3 898.7     Total Intake(mL/kg) 3727.7 (24.8) 2271.4 (15.1)     Urine (mL/kg/hr) 100 (0) 140 (0)     Other  1500     Blood  100     Total Output 100 1740     Net +3627.7 +531.4                    Physical Exam  Vitals reviewed.   Constitutional:       General: She is not in acute distress.     Appearance: She is obese. She is ill-appearing.   HENT:      Head: Normocephalic.   Neck:      Comments: Right IJ trialysis   Cardiovascular:      Rate and Rhythm: Regular rhythm. Tachycardia  present.      Pulses: Normal pulses.   Pulmonary:      Effort: Pulmonary effort is normal.      Comments: Mechanically ventilated   Abdominal:      Palpations: Abdomen is soft.      Tenderness: There is no abdominal tenderness.   Genitourinary:     Comments: Glynn in place with clear yellow urine   Musculoskeletal:      Comments: Dressings C/D/I. Surrounding skin soft edematous, no crepitus    Skin:     General: Skin is warm and dry.   Neurological:      General: No focal deficit present.      Comments: sedated          Significant Labs:  I have reviewed all pertinent lab results within the past 24 hours.  CBC:   Recent Labs   Lab 02/01/24  0322   WBC 18.15*   RBC 3.42*   HGB 9.0*   HCT 26.1*      MCV 76*   MCH 26.3*   MCHC 34.5       CMP:   Recent Labs   Lab 01/29/24  1929 01/30/24  0042 02/01/24  0031   *   < > 236*   CALCIUM 8.9   < > 7.7*   ALBUMIN 2.6*  --   --    PROT 8.0  --   --    *   < > 138   K 4.2   < > 3.5   CO2 17*   < > 22*   CL 94*   < > 99   BUN 44*   < > 35*   CREATININE 3.8*   < > 4.0*   ALKPHOS 151*  --   --    ALT 30  --   --    AST 40  --   --    BILITOT 0.7  --   --     < > = values in this interval not displayed.         Significant Diagnostics:  I have reviewed all pertinent imaging results/findings within the past 24 hours.  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 Now s/p incision and drainage with soft tissue debridement on 1/29. Take back 1/31 for wound exploration requiring further extensive debridement and additional pubic incision. HD 1/31.      - Recommending transfer to [facility] for surgical critical care management. Discussed with surgical and medical staff at [facility] who are in agreement; transfer center contacted and receiving team communicated with.   - Will require return OR 2/2 for repeat wound  exploration  - Okay to change overlying dressings (ABD pads), keep kerlix packing in place   - Recommend sedation pause, neuro exam, SBT this am  - Would recommend extubation if she passes her SBT  - Would prefer IV anti-hypertensives for BP control as opposed to sedation   - Infectious disease consulted, appreciate antibiotic guidance. Initially vanc/zosyn/clinda (1/29); switched to cefepime, meropenam with continued vanc/clinda 1/30. De-escalated to meropenam; vanc 1/31.    Operative cx: presumptive proteus pending finalized data    Blood cx: NGTD   - Nephrology consulted, appreciate assistance, HD 1/31  - Insulin per primary; hyperglycemic on current regimen  - Please initiate OGT tube feeds while intubated   - Okay for DVT ppx   - Continue ICU care        Suzanne Woo MD  General Surgery  [facility] - Intensive Care

## 2024-02-01 NOTE — ASSESSMENT & PLAN NOTE
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 Now s/p incision and drainage with soft tissue debridement on 1/29. Take back 1/31 for wound exploration requiring further extensive debridement and additional pubic incision. HD 1/31.      - Recommending transfer to [facility] for surgical critical care management. Discussed with surgical and medical staff at [facility] who are in agreement; transfer center contacted and receiving team communicated with.   - Will require return OR 2/2 for repeat wound exploration  - Okay to change overlying dressings (ABD pads), keep kerlix packing in place   - Recommend sedation pause, neuro exam, SBT this am  - Would recommend extubation if she passes her SBT  - Would prefer IV anti-hypertensives for BP control as opposed to sedation   - Infectious disease consulted, appreciate antibiotic guidance. Initially vanc/zosyn/clinda (1/29); switched to cefepime, meropenam with continued vanc/clinda 1/30. De-escalated to meropenam; vanc 1/31.    Operative cx: presumptive proteus pending finalized data    Blood cx: NGTD   - Nephrology consulted, appreciate assistance, HD   - Insulin per primary; appears to need   - Consider OGT trophic feeds while intubated   - Okay for DVT ppx   - Continue ICU care

## 2024-02-01 NOTE — CONSULTS
"[facility] Intensive Care  Infectious Disease  Consult Note    Patient Name: Sarah Saravia  MRN: [id number]  Admission Date: 1/29/2024  Hospital Length of Stay: 3 days  Attending Physician: Carlos Acuna, *  Primary Care Provider: [facility]     Isolation Status: No active isolations    Patient information was obtained from patient and ER records.      Consults  Assessment/Plan:     Renal/  * Ayaz's gangrene  53F with h/o obesity (BMI 53) admitted with N/V and R groin wound found to be in DKA.  CT abdomen and pelvis showed extensive soft tissue air throughout the right groin and inguinal region and extending to involve the right lower quadrant anterior abdominal wall with extensive soft tissue air also seen involving the proximal medial aspect of the right thigh, concerning for gas-forming infection.  s/p OR debridement for nec fascitis. R groin tissue with proteus, susceptibilities pending.  On clindamycin, meropenem. ID consulted for "Abx management     Treating nec fascitis. Appreciate sgy help with source control. Suspect polymicrobial infection.     Recommendations:   - continue meropenem for now, renally dosed.    - f/u cultures  - defer to sgy need for additional debridment  - please consult ID at main [facility] for final recs         Thank you for your consult. I will follow-up with patient. Please contact us if you have any additional questions.    Yoanna Michael MD  Infectious Disease  [facility] Intensive Care    Subjective:     Principal Problem: Ayaz's gangrene    HPI: 53F with h/o obesity (BMI 53) admitted with N/V found to be in DKA. Pt currently intubated and sedated. No family at bedside. Per h&p, she had been complaining of wound to R groin without drainage.       CT abdomen and pelvis showed extensive soft tissue air throughout the right groin and inguinal region and extending to involve the right lower quadrant anterior abdominal wall with " "extensive soft tissue air also seen involving the proximal medial aspect of the right thigh, concerning for gas-forming infection.  General surgery was consulted and the patient was taken to the OR for debridement.      Culture of R groin tissue growing:  Component 1 d ago   Aerobic Bacterial Culture      Abnormal   PRESUMPTIVE PROTEUS SPECIES  Moderate  Identification and susceptibility pending           On clindamycin, meropenem    ID consulted for "Abx management     Interval history: NAEO. Getting HD (second session). Remains on vent. Awiating transfer to [facility] for SGY    Past Surgical History:   Procedure Laterality Date    INCISION AND DRAINAGE OF PERIRECTAL REGION N/A 1/29/2024    Procedure: INCISION AND DRAINAGE, PERIRECTAL REGION;  Surgeon: Axel Ramsay MD;  Location: [facility] OR;  Service: General;  Laterality: N/A;    PLACEMENT, TRIALYSIS CATH Right 1/31/2024    Procedure: INSERTION, CATHETER, TRIPLE LUMEN, HEMODIALYSIS, TEMPORARY;  Surgeon: Alvin Junior MD;  Location: [facility] OR;  Service: General;  Laterality: Right;    WOUND EXPLORATION Right 1/31/2024    Procedure: IRRIGATION & DEBRIDEMENT, WOUND DEBRIDEMENT;  Surgeon: Alvin Junior MD;  Location: [facility] OR;  Service: General;  Laterality: Right;       Review of patient's allergies indicates:  No Known Allergies    No current facility-administered medications on file prior to encounter.     No current outpatient medications on file prior to encounter.     Family History    None       Tobacco Use    Smoking status: Not on file    Smokeless tobacco: Not on file   Substance and Sexual Activity    Alcohol use: Not on file    Drug use: Not on file    Sexual activity: Not on file     Review of Systems   Unable to perform ROS: Mental status change   (Post surgery, likely related to sedation)  Objective:     Vital Signs (Most Recent):  Temp: 99.3 °F (37.4 °C) (02/01/24 1515)  Pulse: 95 (02/01/24 1515)  Resp: (!) 22 (02/01/24 1515)  BP: (!) 186/80 " (02/01/24 1500)  SpO2: 97 % (02/01/24 1515) Vital Signs (24h Range):  Temp:  [97.7 °F (36.5 °C)-100.9 °F (38.3 °C)] 99.3 °F (37.4 °C)  Pulse:  [] 95  Resp:  [8-37] 22  SpO2:  [92 %-99 %] 97 %  BP: (143-186)/(65-85) 186/80  Arterial Line BP: (139-200)/(51-81) 178/69     Weight: (!) 149.7 kg (330 lb)  Body mass index is 53.26 kg/m².     Physical Exam  Vitals and nursing note reviewed.   Constitutional:       General: She is not in acute distress.     Appearance: Normal appearance. She is obese. She is not ill-appearing.   HENT:      Head: Normocephalic and atraumatic.      Nose: Nose normal.      Mouth/Throat:      Mouth: Mucous membranes are moist.   Eyes:      Extraocular Movements: Extraocular movements intact.   Cardiovascular:      Rate and Rhythm: Tachycardia present.      Pulses: Normal pulses.      Heart sounds: No murmur heard.  Pulmonary:      Effort: Pulmonary effort is normal. No respiratory distress.   Abdominal:      General: Abdomen is flat.      Palpations: Abdomen is soft.      Tenderness: There is no abdominal tenderness.   Musculoskeletal:      Right lower leg: No edema.      Left lower leg: No edema.      Comments: Right groin wound, packed    Skin:     General: Skin is warm.      Capillary Refill: Capillary refill takes less than 2 seconds.   Neurological:      Mental Status: She is alert.      Comments: A&Ox2 (post anesthesia)                 Significant Labs: All pertinent labs within the past 24 hours have been reviewed.    Significant Imaging: I have reviewed all pertinent imaging results/findings within the past 24 hours.

## 2024-02-01 NOTE — SUBJECTIVE & OBJECTIVE
Interval History: s/p takeback to OR yesterday with repeat debridement and trialysis line placement. Low grade fever with mild tachycardia since OR. Has not had sedation paused since OR for neuro exam, initially without reflexes however has cough this am, has been intermittently overbreathing ventilator. Hypertensive, sedation added for this by pulm team.   HD overnight with 1.5 L fluid pulled; remains acidotic this am. Hyperglycemic to the 300s.     Medications:  Continuous Infusions:   fentanyl 37.5 mcg/hr (01/31/24 2255)    propofoL 25 mcg/kg/min (02/01/24 0622)    sodium bicarbonate 150 mEq in dextrose 5 % (D5W) 1,000 mL infusion Stopped (01/31/24 1712)     Scheduled Meds:   sodium chloride 0.9%   Intravenous Once    sodium chloride 0.9%   Intravenous Once    chlorhexidine  15 mL Mouth/Throat BID    famotidine (PF)  20 mg Intravenous Daily    heparin (porcine)  5,000 Units Subcutaneous Q8H    insulin detemir U-100  10 Units Subcutaneous QHS    meropenem (MERREM) IVPB  1 g Intravenous Daily    mupirocin   Nasal BID     PRN Meds:sodium chloride 0.9%, sodium chloride 0.9%, acetaminophen, acetaminophen, albuterol-ipratropium, dextrose 10 % in water (D10W), dextrose 10 % in water (D10W), dextrose 10%, dextrose 10%, dextrose 10%, dextrose 10%, fentanyl, glucagon (human recombinant), glucose, glucose, insulin aspart U-100, melatonin, naloxone, ondansetron, potassium chloride **AND** potassium chloride **AND** potassium chloride, prochlorperazine, sodium chloride 0.9%, sodium chloride 0.9%, sodium chloride 0.9%, Pharmacy to dose Vancomycin consult **AND** vancomycin - pharmacy to dose     Review of patient's allergies indicates:  No Known Allergies  Objective:     Vital Signs (Most Recent):  Temp: 100 °F (37.8 °C) (02/01/24 0645)  Pulse: 101 (02/01/24 0645)  Resp: (!) 25 (02/01/24 0645)  BP: (!) 177/79 (02/01/24 0600)  SpO2: 97 % (02/01/24 0645) Vital Signs (24h Range):  Temp:  [97.5 °F (36.4 °C)-100.9 °F (38.3 °C)] 100  °F (37.8 °C)  Pulse:  [] 101  Resp:  [20-37] 25  SpO2:  [90 %-100 %] 97 %  BP: (123-177)/(61-83) 177/79  Arterial Line BP: (138-211)/(51-81) 160/58     Weight: (!) 150.1 kg (330 lb 14.6 oz)  Body mass index is 53.41 kg/m².    Intake/Output - Last 3 Shifts         01/30 0700 01/31 0659 01/31 0700 02/01 0659 02/01 0700 02/02 0659    P.O. 0      I.V. (mL/kg) 2244.4 (15) 1372.7 (9.1)     Other  0     IV Piggyback 1483.3 898.7     Total Intake(mL/kg) 3727.7 (24.8) 2271.4 (15.1)     Urine (mL/kg/hr) 100 (0) 140 (0)     Other  1500     Blood  100     Total Output 100 1740     Net +3627.7 +531.4                     Physical Exam  Vitals reviewed.   Constitutional:       General: She is not in acute distress.     Appearance: She is obese. She is ill-appearing.   HENT:      Head: Normocephalic.   Neck:      Comments: Right IJ trialysis   Cardiovascular:      Rate and Rhythm: Regular rhythm. Tachycardia present.      Pulses: Normal pulses.   Pulmonary:      Effort: Pulmonary effort is normal.      Comments: Mechanically ventilated   Abdominal:      Palpations: Abdomen is soft.      Tenderness: There is no abdominal tenderness.   Genitourinary:     Comments: Glynn in place with clear yellow urine   Musculoskeletal:      Comments: Dressings C/D/I. Surrounding skin soft edematous, no crepitus    Skin:     General: Skin is warm and dry.   Neurological:      General: No focal deficit present.      Comments: sedated          Significant Labs:  I have reviewed all pertinent lab results within the past 24 hours.  CBC:   Recent Labs   Lab 02/01/24  0322   WBC 18.15*   RBC 3.42*   HGB 9.0*   HCT 26.1*      MCV 76*   MCH 26.3*   MCHC 34.5       CMP:   Recent Labs   Lab 01/29/24  1929 01/30/24  0042 02/01/24  0031   *   < > 236*   CALCIUM 8.9   < > 7.7*   ALBUMIN 2.6*  --   --    PROT 8.0  --   --    *   < > 138   K 4.2   < > 3.5   CO2 17*   < > 22*   CL 94*   < > 99   BUN 44*   < > 35*   CREATININE 3.8*   < >  4.0*   ALKPHOS 151*  --   --    ALT 30  --   --    AST 40  --   --    BILITOT 0.7  --   --     < > = values in this interval not displayed.         Significant Diagnostics:  I have reviewed all pertinent imaging results/findings within the past 24 hours.

## 2024-02-01 NOTE — ANESTHESIA POSTPROCEDURE EVALUATION
Anesthesia Post Evaluation    Patient: Sarah Saravia    Procedure(s) Performed: Procedure(s) (LRB):  IRRIGATION & DEBRIDEMENT, WOUND DEBRIDEMENT (Right)  INSERTION, CATHETER, TRIPLE LUMEN, HEMODIALYSIS, TEMPORARY (Right)    Final Anesthesia Type: general      Patient location during evaluation: ICU  Patient participation: No - Unable to Participate, Intubation  Level of consciousness: awake and alert and sedated  Post-procedure vital signs: reviewed and stable  Pain management: adequate  Airway patency: patent    PONV status at discharge: No PONV  Anesthetic complications: no      Cardiovascular status: hemodynamically stable  Respiratory status: ETT  Hydration status: euvolemic  Follow-up not needed.              Vitals Value Taken Time   /70 02/01/24 0802   Temp 37.4 °C (99.32 °F) 02/01/24 0813   Pulse 101 02/01/24 0813   Resp 29 02/01/24 0813   SpO2 96 % 02/01/24 0813   Vitals shown include unvalidated device data.      No case tracking events are documented in the log.      Pain/Lucía Score: Pain Rating Prior to Med Admin: 0 (1/31/2024  1:56 PM)  Pain Rating Post Med Admin: 0 (1/31/2024  2:09 PM)

## 2024-02-01 NOTE — NURSING
[facility]  Nurses Note -- 4 Eyes      1/31/2024       Skin assessed on: Q Shift      [x] No Pressure Injuries Present    [x]Prevention Measures Documented    [] Yes LDA  for Pressure Injury Previously documented     [] Yes New Pressure Injury Discovered   [] LDA for New Pressure Injury Added      Attending RN:  Suma Moran RN     Second RN:  ELISABETH Yusuf

## 2024-02-01 NOTE — ASSESSMENT & PLAN NOTE
Patient with acute kidney injury/acute renal failure likely due to pre-renal azotemia due to IVVD ALVARADO is currently stable. Baseline creatinine  unknown  - Labs reviewed- Renal function/electrolytes with Estimated Creatinine Clearance: 20 mL/min (A) (based on SCr of 4.9 mg/dL (H)). according to latest data. Monitor urine output and serial BMP and adjust therapy as needed. Avoid nephrotoxins and renally dose meds for GFR listed above.    Has elevated Cr. No prior record for comparison,on IVF and nephrology is consulted.worsening likely ATN,had HD.

## 2024-02-01 NOTE — CONSULTS
"  [facility] - Intensive Care  Adult Nutrition  Consult Note    SUMMARY     Recommendations    1. Initiate TF Novasource Renal @ 10 mL/hr advancing to 30 mL/hr providing 1440 kcal, 65 g pro and 516 mL water meeting 100% EEN/EPN.    2. Continue to monitor for TF initiation and tolerance.  3. Monitor weights and labs.    4. Collaboration with medical providers    Goals: 1. Pt TF initiated by RD follow up  Nutrition Goal Status: new  Communication of RD Recs:  (POC)    Assessment and Plan    Nutrition Problem  Inadequate oral intake    Related to (etiology):   Diagnosis related symptoms    Signs and Symptoms (as evidenced by):   Intubated  NPO    Interventions/Recommendations (treatment strategy):  Collaboration with medical providers    Nutrition Diagnosis Status:   New     Reason for Assessment    Reason For Assessment: new tube feeding  Diagnosis:  (ros's gangrene)  Relevant Medical History: History reviewed. No pertinent past medical history.  Interdisciplinary Rounds: did not attend  General Information Comments: RD consult for TF recs. Pt intubated with gangrene. Initiate TF Novasource Renal @ 10 mL/hr advancing to 30 mL/hr providing 1440 kcal, 65 g pro and 516 mL water meeting 100% EEN/EPN. LBM 1/28/24. NKFA. BMI 53.41, morbid obesity. NFPE not appropirate at this time, pt intubated.  Nutrition Discharge Planning: pending medical course    Nutrition Risk Screen    Nutrition Risk Screen: large or nonhealing wound, burn or pressure injury, tube feeding or parenteral nutrition    Nutrition/Diet History    Food Allergies: NKFA    Anthropometrics    Temp: 99.3 °F (37.4 °C)  Height Method: Stated  Height: 5' 6" (167.6 cm)  Height (inches): 66 in  Weight Method: Bed Scale  Weight: (!) 149.7 kg (330 lb)  Weight (lb): (!) 330 lb  Ideal Body Weight (IBW), Female: 130 lb  % Ideal Body Weight, Female (lb): 253.85 %  BMI (Calculated): 53.3  BMI Grade: greater than 40 - morbid obesity   "     Lab/Procedures/Meds    Pertinent Labs Reviewed: reviewed  BMP  Lab Results   Component Value Date     02/01/2024    K 3.5 02/01/2024    CL 99 02/01/2024    CO2 22 (L) 02/01/2024    BUN 35 (H) 02/01/2024    CREATININE 4.0 (H) 02/01/2024    CALCIUM 7.7 (L) 02/01/2024    ANIONGAP 17 (H) 02/01/2024    EGFRNORACEVR 13 (A) 02/01/2024      Pertinent Medications Reviewed: reviewed  No current outpatient medications     Estimated/Assessed Needs    Weight Used For Calorie Calculations: 59 kg (130 lb)  Energy Calorie Requirements (kcal): 1405 kcal  Energy Need Method: [facility]  Protein Requirements: 59 g  Weight Used For Protein Calculations: 59 kg (130 lb)     Estimated Fluid Requirement Method: RDA Method  RDA Method (mL): 1405         Nutrition Prescription Ordered    Current Diet Order: NPO    Evaluation of Received Nutrient/Fluid Intake    I/O: i/o  Energy Calories Required: not meeting needs  Protein Required: not meeting needs  Fluid Required: not meeting needs  Comments: lbm 1/28/24  % Intake of Estimated Energy Needs: 0 - 25 %  % Meal Intake: NPO    Nutrition Risk    Level of Risk/Frequency of Follow-up: moderate - high       Monitor and Evaluation    Food and Nutrient Intake: enteral nutrition intake, parenteral nutrition intake  Food and Nutrient Adminstration: enteral and parenteral nutrition administration  Knowledge/Beliefs/Attitudes: food and nutrition knowledge/skill, beliefs and attitudes  Physical Activity and Function: nutrition-related ADLs and IADLs, factors affecting access to physical activity  Anthropometric Measurements: weight, weight change, body mass index  Biochemical Data, Medical Tests and Procedures: electrolyte and renal panel  Nutrition-Focused Physical Findings: overall appearance       Nutrition Follow-Up    RD Follow-up?: Yes    Negra Hartmann, Registration Eligible, Provisional LDN

## 2024-02-01 NOTE — SUBJECTIVE & OBJECTIVE
History reviewed. No pertinent past medical history.    Past Surgical History:   Procedure Laterality Date    INCISION AND DRAINAGE OF PERIRECTAL REGION N/A 1/29/2024    Procedure: INCISION AND DRAINAGE, PERIRECTAL REGION;  Surgeon: Axel Ramsay MD;  Location: [facility];  Service: General;  Laterality: N/A;       Review of patient's allergies indicates:  No Known Allergies    No current facility-administered medications on file prior to encounter.     No current outpatient medications on file prior to encounter.     Family History    None       Tobacco Use    Smoking status: Not on file    Smokeless tobacco: Not on file   Substance and Sexual Activity    Alcohol use: Not on file    Drug use: Not on file    Sexual activity: Not on file     Review of Systems   Unable to perform ROS: Mental status change   (Post surgery, likely related to sedation)  Objective:     Vital Signs (Most Recent):  Temp: 98.8 °F (37.1 °C) (02/01/24 1015)  Pulse: 101 (02/01/24 1015)  Resp: (!) 30 (02/01/24 1015)  BP: (!) 155/69 (02/01/24 1000)  SpO2: 97 % (02/01/24 1015) Vital Signs (24h Range):  Temp:  [97.5 °F (36.4 °C)-100.9 °F (38.3 °C)] 98.8 °F (37.1 °C)  Pulse:  [] 101  Resp:  [20-37] 30  SpO2:  [90 %-100 %] 97 %  BP: (123-177)/(61-83) 155/69  Arterial Line BP: (138-211)/(51-81) 156/59     Weight: (!) 149.7 kg (330 lb)  Body mass index is 53.26 kg/m².     Physical Exam  Vitals and nursing note reviewed.   Constitutional:       General: She is not in acute distress.     Appearance: Normal appearance. She is obese. She is not ill-appearing.   HENT:      Head: Normocephalic and atraumatic.      Nose: Nose normal.      Mouth/Throat:      Mouth: Mucous membranes are moist.   Eyes:      Extraocular Movements: Extraocular movements intact.   Cardiovascular:      Rate and Rhythm: Tachycardia present.      Pulses: Normal pulses.      Heart sounds: No murmur heard.  Pulmonary:      Effort: Pulmonary effort is normal. No respiratory  distress.   Abdominal:      General: Abdomen is flat.      Palpations: Abdomen is soft.      Tenderness: There is no abdominal tenderness.   Musculoskeletal:      Right lower leg: No edema.      Left lower leg: No edema.      Comments: Right groin wound, packed    Skin:     General: Skin is warm.      Capillary Refill: Capillary refill takes less than 2 seconds.   Neurological:      Mental Status: She is alert.      Comments: A&Ox2 (post anesthesia)                 Significant Labs: All pertinent labs within the past 24 hours have been reviewed.    Significant Imaging: I have reviewed all pertinent imaging results/findings within the past 24 hours.

## 2024-02-01 NOTE — SUBJECTIVE & OBJECTIVE
Interval History:  Patient resting in bed, on ventilator and in no obvious discomfort.  All cranial nerve reflexes have returned (received paralytic in OR).  Got RRT overnight with improvement in acidosis and metabolic derangements.  SAT this AM resulted in no significant improvement in mental status.  Will hold off on extubation primarily based on this, however she also is pending a return to the OR tomorrow as well.     Review of Systems   Unable to perform ROS: Acuity of condition     Objective:     Vital Signs (Most Recent):  Temp: 98.8 °F (37.1 °C) (02/01/24 1153)  Pulse: 98 (02/01/24 1153)  Resp: (!) 28 (02/01/24 1153)  BP: (!) 168/75 (02/01/24 1100)  SpO2: 96 % (02/01/24 1153) Vital Signs (24h Range):  Temp:  [97.5 °F (36.4 °C)-100.9 °F (38.3 °C)] 98.8 °F (37.1 °C)  Pulse:  [] 98  Resp:  [20-37] 28  SpO2:  [90 %-100 %] 96 %  BP: (123-177)/(61-83) 168/75  Arterial Line BP: (138-211)/(51-81) 166/60     Weight: (!) 149.7 kg (330 lb)  Body mass index is 53.26 kg/m².      Intake/Output Summary (Last 24 hours) at 2/1/2024 1219  Last data filed at 2/1/2024 1142  Gross per 24 hour   Intake 1848.32 ml   Output 1740 ml   Net 108.32 ml          Physical Exam  Constitutional:       General: She is not in acute distress.     Appearance: She is obese. She is ill-appearing. She is not toxic-appearing.   HENT:      Head: Normocephalic and atraumatic.      Nose: Nose normal.      Mouth/Throat:      Mouth: Mucous membranes are moist.   Eyes:      Extraocular Movements: Extraocular movements intact.      Pupils: Pupils are equal, round, and reactive to light.   Cardiovascular:      Rate and Rhythm: Normal rate and regular rhythm.   Pulmonary:      Effort: Pulmonary effort is normal.      Comments: Mild rales  Abdominal:      General: Abdomen is flat. There is no distension.      Palpations: Abdomen is soft.      Tenderness: There is no abdominal tenderness.   Musculoskeletal:         General: Normal range of motion.       Cervical back: Normal range of motion.      Right lower leg: No edema.      Left lower leg: No edema.   Skin:     General: Skin is warm and dry.      Comments: Wound to right groin to buttock   Neurological:      Mental Status: She is disoriented.      Comments: Sedated.          Vents:  Vent Mode: PRVC (02/01/24 1153)  Set Rate: 18 BPM (02/01/24 1153)  Vt Set: 420 mL (02/01/24 1153)  PEEP/CPAP: 5 cmH20 (02/01/24 1153)  Oxygen Concentration (%): 21 (02/01/24 1153)  Peak Airway Pressure: 9.4 cmH20 (02/01/24 1153)  Total Ve: 11.8 L/m (02/01/24 1153)  F/VT Ratio<105 (RSBI): (!) 70.35 (02/01/24 1153)    Lines/Drains/Airways       Central Venous Catheter Line  Duration             Trialysis (Dialysis) Catheter 01/31/24 1051 right internal jugular 1 day              Drain  Duration                  Urethral Catheter 01/29/24 2315 2 days              Airway  Duration                  Airway - Non-Surgical 01/31/24 1020 1 day              Arterial Line  Duration             Arterial Line 01/31/24 1025 Right Radial 1 day              Peripheral Intravenous Line  Duration                  Peripheral IV - Single Lumen 01/29/24 2116 Left Antecubital 2 days                    Significant Labs:    CBC/Anemia Profile:  Recent Labs   Lab 01/31/24  0519 02/01/24  0322   WBC 16.06* 18.15*   HGB 11.3* 9.0*   HCT 35.0* 26.1*    194   MCV 80* 76*   RDW 14.5 14.0          Chemistries:  Recent Labs   Lab 01/31/24  0520 01/31/24  0827 01/31/24  1558 02/01/24  0031 02/01/24  0322 02/01/24  0855   NA  --    < > 134* 138  --  137   K  --    < > 3.9 3.5  --  3.5   CL  --    < > 102 99  --  98   CO2  --    < > 17* 22*  --  23   BUN  --    < > 58* 35*  --  40*   CREATININE  --    < > 5.4* 4.0*  --  4.9*   CALCIUM  --    < > 7.5* 7.7*  --  7.7*   PHOS 3.7  --   --   --  4.1  --     < > = values in this interval not displayed.         All pertinent labs within the past 24 hours have been reviewed.    Significant Imaging:   I have reviewed  all pertinent imaging results/findings within the past 24 hours.

## 2024-02-01 NOTE — OP NOTE
Date: 1/31/2024    Physician: Surgeon(s) and Role:     * Alvin Junior MD - Primary  Suzanne Woo MD - Resident, Chief  Salvador Rojo MD - Resident, Assist     Preoperative Diagnosis: Necrotizing soft tissue infection, diabetic ketoacidosis     Postoperative Diagnosis: Same     Procedure: IRRIGATION & DEBRIDEMENT, WOUND DEBRIDEMENT (Right)  INSERTION, CATHETER, TRIPLE LUMEN, HEMODIALYSIS, TEMPORARY (Right)     Anesthesia: General     Findings: Multiple areas of necrotic tissue within pre-existing wound bed - extensive debridement required at inferior portion of incision medially and laterally down to the level of the muscular fascia. Portion of necrotic buttock skin resected.       Additional incision made over pubis stretching from midline to right lateral edge, found to have necrotic tissue within this area that did not appear to track/communicate with areas over right thigh/buttock.     Right IJ trialysis placed under US guidance     Indication: This is a 53 year old female with previously undiagnosed diabetes who presented to ED in [facility] with ros's gangrene as evidenced by CT imaging and lab work up. We recommended urgent surgical exploration and debridement. Patient and family agreed and informed consent was obtained.     Procedure:    The patient was identified in preoperative holding and brought back to the operating room. She was placed supine on the operating room table and padded appropriately. Monitors were applied and there was smooth induction of general endotracheal anesthesia. A timeout was performed and all team members present agreed that this was the correct procedure on the correct patient. We also confirmed administration of appropriate preoperative antibiotics.    We elected for trialysis line placement first and elected to attempt right internal jugular. Ultrasound was used to ensure the right internal jugular vein was patent. A small nick was made in the skin in the right neck.  Ultrasound was used for guidance and an 18-gauge hollow needle was used to access the vessel. Wire was inserted through the access needle and ultrasound was used to confirm the wire was in the correct position. The subcutaneous tissues were then serially dilated and the catheter was placed into the vessel via Seldinger technique. Once advanced, the wire was removed. All ports withdrew dark red blood and flushed easily. The catheter was sutured into place and a biopatch was placed over entry site. Sterile dressings were applied. Post operative CXR will be obtained to confirm adequate positioning.     We then directed our attention to the groin for repeat debridement. She was placed in lithotomy. The previous dressing and prior operative packing was removed. We did confirm removal of 5 kerlix gauze which corresponded to prior operative notes. Her pubic region and bilateral thighs were prepped and draped in the standard sterile surgical fashion. We first closely examined her pubic region and noted residual crepitus on exam. We elected to make an additional incision over the suprapubic tissue and carried our dissection down through the subcutaneous tissues which revealed a pocket of purulent fluid as well as some necrotic fat. Minimal debridement was performed here, although we did extend our dissection to the level of the abdominal wall - there appeared to be dusky, suspicious tissue beneath the presumed external oblique aponeurosis and this was incised sharply. This released more purulent fluid but did not reveal frankly necrotic tissue underneath and so we finished our debridement of this area. We took great care to bluntly probe the infero-lateral edges of this new incision and it did not appear to track to or communicate with the right thigh wound. This wound was left packed while we finished the remainder of our planned exploration.     We then directed our attention to the prior incision started at the  supero-anterior most edge, working our way inferiorly to evaluate need for further debridement. Minimal additional debridement was required in these areas. As we worked our way inferiorly additional debridement was required on the deep, lateral, and medial borders of the wound. Necrotic tissue extended down to what appeared to be the the origin of the adductor muscles - this was largely left intact. Most inferiorly as we evaluated the inferior gluteal sulcus. There was noted to be a moderate amount of remaining necrotic tissue and skin at this area which was resected and debrided. We then utilized hydrostatic debridement with a pulsavac and 3 L of normal saline in both wound beds. Following this we insured adequate hemostasis in the wound beds. We then packed the wound with 5 betadine soaked kerlixes which are tied together with one overlying dry kerlix. The pubic incision was packed with a single betadine soaked kerlix.     Sterile dressings were applied. The patient was extubated in the operating room and transported to the recovery room in stable condition. All sponge, instrument, and needle counts were reported as correct at the end of the procedure.    Estimated Blood Loss: 100 cc    Complications: None    Drains: None    Implants:  Implant Name Type Inv. Item Serial No.  Lot No. LRB No. Used Action   TRAY CATH PWR TRIALYSIS 15CM - [id number]   TRAY CATH PWR TRIALYSIS 15CM [id number][employer] 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 ROSENDA PERES S     Release to patient Immediate         01/31/24 6255

## 2024-02-01 NOTE — PROGRESS NOTES
[facility]  Progress Note    Patient Name: Sarah Saravia  MRN: [id number]  Patient Class: IP- Inpatient   Admission Date: 2024  Length of Stay: 3 days  Attending Physician: Carlos Acuna, *  Primary Care Provider: [facility]        Subjective:     Principal Problem:Ros's gangrene        HPI:  This is a 53-year-old female with a past medical history of morbid obesity who presents with vomiting.     Patient presents with nausea, vomiting that started 4 days prior to presentation.  She also reports a wound in her right groin area without associated drainage.    In the ED, the patient was tachycardic (up to 120s) and hypertensive.  Labs were remarkable for leukocytosis (21.6), elevated creatinine (3.8-unknown baseline), elevated lactic acid (5.6), hyponatremia (130), elevated CRP (530.2) and were suggestive of DKA (B, A, CO2: 17, BHB: 3.2, PH: 7.293).  CT abdomen and pelvis showed extensive soft tissue air throughout the right groin and inguinal region and extending to involve the right lower quadrant anterior abdominal wall with extensive soft tissue air also seen involving the proximal medial aspect of the right thigh, concerning for gas-forming infection.  General surgery was consulted and the patient was taken to the OR for debridement.  Patient was given 2.7 L of NS, vancomycin, Zosyn, clindamycin, morphine 4 mg IV, Zofran 4 mg IV and was started on an insulin drip.  She was admitted for further management.    Overview/Hospital Course:  P53-year-old female with a past medical history of morbid obesity who presents with vomiting.DKA and right groin  ros gangrene,  Per CT,  Extensive soft tissue air throughout the right groin and inguinal region and extending to involve the right lower quadrant anterior abdominal wall with extensive soft tissue air also seen involving the proximal medial aspect of the right thigh.   This is most concerning for gas-forming infection.   S/P urgent debridement by surgery,on broad spectrum IV Abx,cultures are pending.will have another debridement today,culture growing proteus,consulted ID.on Merem and vanc at this time.  S/P second debridement on 1.31.24  Has elevated Cr. No prior record for comparison,on IVF and nephrology is consulted.worsening likely ATN,has been  started on HD.  Replaced phosphorous,  DKA with insulin gtt is improving.on SSI insulin.  Surgery,called for transfer to SICU in [facility],called transfer center,no available bed at this time.      History reviewed. No pertinent past medical history.    Past Surgical History:   Procedure Laterality Date    INCISION AND DRAINAGE OF PERIRECTAL REGION N/A 1/29/2024    Procedure: INCISION AND DRAINAGE, PERIRECTAL REGION;  Surgeon: Axel Ramsay MD;  Location: [facility] OR;  Service: General;  Laterality: N/A;       Review of patient's allergies indicates:  No Known Allergies    No current facility-administered medications on file prior to encounter.     No current outpatient medications on file prior to encounter.     Family History    None       Tobacco Use    Smoking status: Not on file    Smokeless tobacco: Not on file   Substance and Sexual Activity    Alcohol use: Not on file    Drug use: Not on file    Sexual activity: Not on file     Review of Systems   Unable to perform ROS: Mental status change   (Post surgery, likely related to sedation)  Objective:     Vital Signs (Most Recent):  Temp: 98.8 °F (37.1 °C) (02/01/24 1015)  Pulse: 101 (02/01/24 1015)  Resp: (!) 30 (02/01/24 1015)  BP: (!) 155/69 (02/01/24 1000)  SpO2: 97 % (02/01/24 1015) Vital Signs (24h Range):  Temp:  [97.5 °F (36.4 °C)-100.9 °F (38.3 °C)] 98.8 °F (37.1 °C)  Pulse:  [] 101  Resp:  [20-37] 30  SpO2:  [90 %-100 %] 97 %  BP: (123-177)/(61-83) 155/69  Arterial Line BP: (138-211)/(51-81) 156/59     Weight: (!) 149.7 kg (330 lb)  Body mass index is 53.26 kg/m².      Physical Exam  Vitals and nursing note reviewed.   Constitutional:       General: She is not in acute distress.     Appearance: Normal appearance. She is obese. She is not ill-appearing.   HENT:      Head: Normocephalic and atraumatic.      Nose: Nose normal.      Mouth/Throat:      Mouth: Mucous membranes are moist.   Eyes:      Extraocular Movements: Extraocular movements intact.   Cardiovascular:      Rate and Rhythm: Tachycardia present.      Pulses: Normal pulses.      Heart sounds: No murmur heard.  Pulmonary:      Effort: Pulmonary effort is normal. No respiratory distress.   Abdominal:      General: Abdomen is flat.      Palpations: Abdomen is soft.      Tenderness: There is no abdominal tenderness.   Musculoskeletal:      Right lower leg: No edema.      Left lower leg: No edema.      Comments: Right groin wound, packed    Skin:     General: Skin is warm.      Capillary Refill: Capillary refill takes less than 2 seconds.   Neurological:      Mental Status: She is alert.      Comments: A&Ox2 (post anesthesia)                 Significant Labs: All pertinent labs within the past 24 hours have been reviewed.    Significant Imaging: I have reviewed all pertinent imaging results/findings within the past 24 hours.    Assessment/Plan:      * Ayaz's gangrene  CT abdomen and pelvis showed extensive soft tissue air throughout the right groin and inguinal region and extending to involve the right lower quadrant anterior abdominal wall with extensive soft tissue air also seen involving the proximal medial aspect of the right thigh, concerning for gas-forming infection.    S/p OR for debridement on 1/29      Per ID on merem and  vanc at this time.  Culture growing proteus,consulted ID.  S/P second debridement on 1.31.24.  Surgery,called for transfer to SICU in [facility],called transfer center,no available bed at this time.        Hyponatremia  Patient has hyponatremia which is uncontrolled,We will aim to correct the  sodium by 4-6mEq in 24 hours. We will monitor sodium Every 4 hours. The hyponatremia is due to Dehydration/hypovolemia. We will obtain the following studies: Urine sodium, urine osmolality, serum osmolality. We will treat the hyponatremia with IV fluids as follows: per DKA protocol. The patient's sodium results have been reviewed and are listed below.  Recent Labs   Lab 01/29/24  1929   *       ALVARADO (acute kidney injury)  Patient with acute kidney injury/acute renal failure likely due to pre-renal azotemia due to IVVD ALVARADO is currently stable. Baseline creatinine  unknown  - Labs reviewed- Renal function/electrolytes with Estimated Creatinine Clearance: 20 mL/min (A) (based on SCr of 4.9 mg/dL (H)). according to latest data. Monitor urine output and serial BMP and adjust therapy as needed. Avoid nephrotoxins and renally dose meds for GFR listed above.    Has elevated Cr. No prior record for comparison,on IVF and nephrology is consulted.worsening likely ATN,had HD.      Severe sepsis  This patient does have evidence of infective focus  My overall impression is sepsis.  Source: Skin and Soft Tissue (location groin)  Antibiotics given-   Antibiotics (72h ago, onward)      Start     Stop Route Frequency Ordered    01/30/24 0530  clindamycin injection 900 mg         -- IM Every 8 hours (non-standard times) 01/30/24 0017    01/30/24 0330  piperacillin-tazobactam (ZOSYN) 4.5 g in dextrose 5 % in water (D5W) 100 mL IVPB (MB+)         -- IV Every 8 hours (non-standard times) 01/30/24 0017    01/29/24 2000  vancomycin (VANCOCIN) 2,500 mg in dextrose 5 % (D5W) 500 mL IVPB         -- IV Once 01/29/24 1931    01/29/24 1944  vancomycin - pharmacy to dose  (vancomycin IVPB (PEDS and ADULTS))        See Hyperspace for full Linked Orders Report.    -- IV pharmacy to manage frequency 01/29/24 1846          Latest lactate reviewed-  Recent Labs   Lab 01/29/24  1950   POCLAC 5.64*     Organ dysfunction indicated by Acute kidney  injury    Fluid challenge Ideal Body Weight- The patient's ideal body weight is Ideal body weight: 59.3 kg (130 lb 11.7 oz) which will be used to calculate fluid bolus of 30 ml/kg for treatment of septic shock.      Post- resuscitation assessment Yes Perfusion exam was performed within 6 hours of septic shock presentation after bolus shows Adequate tissue perfusion assessed by non-invasive monitoring       Will Not start Pressors- Levophed for MAP of 65  Source control achieved by: Abx    Morbid obesity  Body mass index is 56.49 kg/m². Morbid obesity complicates all aspects of disease management from diagnostic modalities to treatment. Weight loss encouraged and health benefits explained to patient.         Diabetic acidosis without coma  DKA protocol. IV insulin, IV fluids  Monitor BMPs  On SQ insulin at this time.      VTE Risk Mitigation (From admission, onward)           Ordered     heparin (porcine) injection 5,000 Units  Every 8 hours         01/31/24 1430     Reason for No Pharmacological VTE Prophylaxis  Once        Question:  Reasons:  Answer:  Risk of Bleeding    01/31/24 1331     IP VTE HIGH RISK PATIENT  Once         01/31/24 1331     Place sequential compression device  Until discontinued         01/30/24 0016     Place NATE hose  Until discontinued         01/30/24 0016                    Discharge Planning   ZEKE:      Code Status: Full Code   Is the patient medically ready for discharge?:     Reason for patient still in hospital (select all that apply): Patient trending condition               Critical care time spent on the evaluation and treatment of severe organ dysfunction, review of pertinent labs and imaging studies, discussions with consulting providers and discussions with patient/family:  over 45  minutes.      Carlos Acuna MD  Department of Hospital Medicine   [facility] [place] - Intensive Care

## 2024-02-01 NOTE — PLAN OF CARE
Pt remains in ICU on ventilator. Large amounts of secretions. GCS of 3. Weak cough present. On fent and propofol gtt. Abd pad saturated post op; changed and reinforced dressing. Tolerated dialysis well with 1.5 L taken off. 15 cc of UOP via meza. No new falls, injuries, or skin breakdown. Pt daughter at bedside. Updated on POC.    Problem: Adult Inpatient Plan of Care  Goal: Plan of Care Review  Outcome: Ongoing, Progressing  Goal: Patient-Specific Goal (Individualized)  Outcome: Ongoing, Progressing  Goal: Absence of Hospital-Acquired Illness or Injury  Outcome: Ongoing, Progressing  Goal: Optimal Comfort and Wellbeing  Outcome: Ongoing, Progressing  Goal: Readiness for Transition of Care  Outcome: Ongoing, Progressing

## 2024-02-01 NOTE — PROGRESS NOTES
Pharmacokinetic Assessment Follow Up: IV Vancomycin    Vancomycin serum concentration assessment(s):    The random level was drawn correctly and can be used to guide therapy at this time. The measurement is within the desired definitive target range of 10 to 20 mcg/mL.    Vancomycin Regimen Plan:    Give 500 mg after dialysis today.  Re-dose when the random level is less than 20 mcg/mL, next level to be drawn at 0300 on 2/2/2024    Drug levels (last 3 results):  Recent Labs   Lab Result Units 01/30/24  1355 01/31/24  0520 02/01/24  0322   Vancomycin, Random ug/mL 30.5 28.2 19.1       Pharmacy will continue to follow and monitor vancomycin.    Please contact pharmacy at extension [phone] for questions regarding this assessment.    Thank you for the consult,   Melvin Guillermo Jr       Patient brief summary:  Sarah Saravia is a 53 y.o. female initiated on antimicrobial therapy with IV Vancomycin for treatment of skin & soft tissue infection    Drug Allergies:   Review of patient's allergies indicates:  No Known Allergies    Actual Body Weight:   150.1 kg    Renal Function:   Estimated Creatinine Clearance: 24.5 mL/min (A) (based on SCr of 4 mg/dL (H)).,     Dialysis Method (if applicable):  intermittent HD    CBC (last 72 hours):  Recent Labs   Lab Result Units 01/29/24  1929 01/30/24  0042 01/30/24  0447 01/31/24  0519 02/01/24  0322   WBC K/uL 21.60* 17.67* 14.73* 16.06* 18.15*   Hemoglobin g/dL 12.0 13.0 12.0 11.3* 9.0*   Hemoglobin A1C %  --  10.4*  --   --   --    Hematocrit % 38.5 42.1 37.9 35.0* 26.1*   Platelets K/uL 217 216 186 197 194   Gran % % 87.8* 89.2* 86.3* 58.0 82.0*   Lymph % % 5.6* 7.1* 9.0* 9.0* 9.6*   Mono % % 4.3 2.8* 3.5* 4.0 4.1   Eosinophil % % 0.5 0.1 0.1 1.0 0.4   Basophil % % 0.6 0.2 0.3 0.0 0.7   Differential Method  Automated Automated Automated Manual Automated       Metabolic Panel (last 72 hours):  Recent Labs   Lab Result Units 01/29/24  1929 01/30/24  0042 01/30/24  0415  01/30/24  0447 01/30/24  0834 01/30/24  0958 01/30/24  1355 01/30/24  1633 01/30/24  2043 01/31/24  0100 01/31/24  0519 01/31/24  0520 01/31/24  0827 01/31/24  1558 02/01/24  0031 02/01/24  0322   Sodium mmol/L 130* 134*  --  134* 136  --  134* 136 136 134* 136  --  135* 134* 138  --    Sodium, Urine mmol/L  --   --   --   --   --  <20*  --   --   --   --   --   --   --   --   --   --    Potassium mmol/L 4.2 3.9  --  3.8 4.1  --  3.9 3.6 3.5 3.8 3.9  --  4.6 3.9 3.5  --    Chloride mmol/L 94* 103  --  103 109  --  105 105 106 105 103  --  107 102 99  --    CO2 mmol/L 17* 15*  --  17* 18*  --  17* 20* 16* 16* 16*  --  14* 17* 22*  --    Glucose mg/dL 824* 571*  --  412* 167*  --  198* 137* 125* 130* 145*  --  197* 334* 236*  --    Glucose, UA   --   --  4+*  --   --   --   --   --   --   --   --   --   --   --   --   --    BUN mg/dL 44* 44*  --  46* 47*  --  47* 50* 50* 51* 51*  --  55* 58* 35*  --    Creatinine mg/dL 3.8* 3.5*  --  3.6* 3.8*  --  3.9* 4.4* 4.6* 4.7* 5.0*  --  5.4* 5.4* 4.0*  --    Creatinine, Urine mg/dL  --   --   --   --   --  274.8  --   --   --   --   --   --   --   --   --   --    Albumin g/dL 2.6*  --   --   --   --   --   --   --   --   --   --   --   --   --   --   --    Total Bilirubin mg/dL 0.7  --   --   --   --   --   --   --   --   --   --   --   --   --   --   --    Alkaline Phosphatase U/L 151*  --   --   --   --   --   --   --   --   --   --   --   --   --   --   --    AST U/L 40  --   --   --   --   --   --   --   --   --   --   --   --   --   --   --    ALT U/L 30  --   --   --   --   --   --   --   --   --   --   --   --   --   --   --    Phosphorus mg/dL  --  2.4*  --  1.9*  --   --   --   --   --   --   --  3.7  --   --   --  4.1       Vancomycin Administrations:  vancomycin given in the last 96 hours                     vancomycin (VANCOCIN) 2,500 mg in dextrose 5 % (D5W) 500 mL IVPB ()  Restarted 01/30/24 0328     2,500 mg New Bag  0056                    Microbiologic  Results:  Microbiology Results (last 7 days)       Procedure Component Value Units Date/Time    Blood culture x two cultures. Draw prior to antibiotics. [[id number]] Collected: 01/29/24 2118    Order Status: Completed Specimen: Blood from Peripheral, Antecubital, Left Updated: 01/31/24 2303     Blood Culture, Routine No Growth to date      No Growth to date      No Growth to date    Narrative:      Aerobic and anaerobic    AFB Culture & Smear [[id number]] Collected: 01/30/24 0219    Order Status: Completed Specimen: Abscess from Groin Updated: 01/31/24 2127     AFB Culture & Smear Culture in progress     AFB CULTURE STAIN No acid fast bacilli seen.    Narrative:      Right groin abcess    AFB Culture & Smear [[id number]] Collected: 01/30/24 0228    Order Status: Completed Specimen: Wound from Groin Updated: 01/31/24 2127     AFB Culture & Smear Culture in progress     AFB CULTURE STAIN No acid fast bacilli seen.    Narrative:      Right groin tissue    Blood culture x two cultures. Draw prior to antibiotics. [[id number]] Collected: 01/29/24 1929    Order Status: Completed Specimen: Blood from Peripheral, Antecubital, Right Updated: 01/31/24 2103     Blood Culture, Routine No Growth to date      No Growth to date      No Growth to date    Narrative:      Aerobic and anaerobic    Aerobic culture [[id number]]  (Abnormal) Collected: 01/30/24 0228    Order Status: Completed Specimen: Wound from Groin Updated: 01/31/24 0915     Aerobic Bacterial Culture PRESUMPTIVE PROTEUS SPECIES  Moderate  Identification and susceptibility pending      Narrative:      Right groin tissue    Aerobic culture [[id number]]  (Abnormal) Collected: 01/30/24 0219    Order Status: Completed Specimen: Abscess from Groin Updated: 01/31/24 0914     Aerobic Bacterial Culture PRESUMPTIVE PROTEUS SPECIES  Moderate  Identification and susceptibility pending      Narrative:      Right groin abcess    Culture, Anaerobe [[id number]] Collected: 01/30/24  0219    Order Status: Completed Specimen: Abscess from Groin Updated: 01/31/24 0852     Anaerobic Culture Culture in progress    Narrative:      Right groin abcess    Gram stain [[id number]] Collected: 01/30/24 0228    Order Status: Completed Specimen: Wound from Groin Updated: 01/30/24 1035     Gram Stain Result Moderate Gram positive cocci in pairs and chains      No WBC's    Narrative:      Right groin tissue    Gram stain [[id number]] Collected: 01/30/24 0219    Order Status: Completed Specimen: Abscess from Groin Updated: 01/30/24 1035     Gram Stain Result Moderate Gram positive cocci in pairs and chains      No WBC's    Narrative:      Right groin abcess    Fungus culture [[id number]] Collected: 01/30/24 0219    Order Status: Sent Specimen: Abscess from Groin Updated: 01/30/24 0247    Fungus culture [[id number]] Collected: 01/30/24 0228    Order Status: Sent Specimen: Wound from Groin Updated: 01/30/24 0243    Culture, Anaerobe [[id number]] Collected: 01/30/24 0228    Order Status: Sent Specimen: Wound from Groin Updated: 01/30/24 0240    AFB Culture & Smear [[id number]] Collected: 01/30/24 0058    Order Status: Sent Specimen: Abscess from Groin Updated: 01/30/24 0058    AFB Culture & Smear [[id number]] Collected: 01/29/24 1115    Order Status: Sent Specimen: Abscess from Groin Updated: 01/30/24 0019    AFB Culture & Smear [[id number]] Collected: 01/29/24 1115    Order Status: Sent Specimen: Abscess from Groin Updated: 01/30/24 0019    Fungus culture [[id number]] Collected: 01/29/24 1115    Order Status: Canceled Specimen: Abscess from Groin     Gram stain [[id number]] Collected: 01/29/24 1115    Order Status: Canceled Specimen: Abscess from Groin     Culture, Anaerobe [[id number]] Collected: 01/29/24 1115    Order Status: Canceled Specimen: Abscess from Groin     Aerobic culture [[id number]] Collected: 01/29/24 1115    Order Status: Canceled Specimen: Abscess from Groin     Culture, Anaerobe [[id number]]  Collected: 01/29/24 1115    Order Status: Canceled Specimen: Abscess from Groin     Aerobic culture [[id number]] Collected: 01/29/24 1115    Order Status: Canceled Specimen: Abscess from Groin     Gram stain [[id number]] Collected: 01/29/24 1115    Order Status: Canceled Specimen: Abscess from Groin     Fungus culture [[id number]] Collected: 01/29/24 1115    Order Status: Canceled Specimen: Abscess from Groin

## 2024-02-01 NOTE — PLAN OF CARE
Remains in ICU. Went to OR today for debridement, returned to unit intubated. Currently GCS of 3, escalated to MD, believed to be residual paralytic, will continue to closely monitor neuro status. Prop/Fent for sedation. Trialysis placed in OR, currently on dialysis, tolerating well. ~10cc urine out per hour. Free of falls, injury, or breakdown.

## 2024-02-01 NOTE — ASSESSMENT & PLAN NOTE
CT abdomen and pelvis showed extensive soft tissue air throughout the right groin and inguinal region and extending to involve the right lower quadrant anterior abdominal wall with extensive soft tissue air also seen involving the proximal medial aspect of the right thigh, concerning for gas-forming infection.    S/p OR for debridement on 1/29      Per ID on merem and  vanc at this time.  Culture growing proteus,consulted ID.  S/P second debridement on 1.31.24.  Surgery,[place] [facility],no available bed at this time.

## 2024-02-01 NOTE — PROGRESS NOTES
[facility] Intensive Care  Critical Care Medicine  Progress Note    Patient Name: Sarah Saravia  MRN: [id number]  Admission Date: 1/29/2024  Hospital Length of Stay: 3 days  Code Status: Full Code  Attending Provider: Carlos Acuna, *  Primary Care Provider: [facility]   Principal Problem: Ayaz's gangrene    Subjective:     HPI:  Sarah Saravia is a 53 year old female with history of morbid obesity, who initially presented with vomiting x 4 days.  She was found to have a right groin wound.  She had a leukocytosis (21.6), ALVARADO (Cr 3.8), and was in DKA.  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  Patient was taken to the OR for debridement.  She was fluid resuscitated and started on vanc/zosyn/clinda.  Also treated for DKA with insulin infusion.  Admitted to the ICU for further care.     She returned to the OR on 1/31/2024 and returned intubated.  ICU was consulted for management of ventilator and critical illness.  She now has a right sided trialysis in place from the OR.  Nephrology planning on starting HD for progressive renal dysfunction.  She has since been transitioned to SQ insulin.    Hospital/ICU Course:  When initially seen, she is resting on the ventilator.  She is hypertensive, and tachycardic, presumably due to pain from surgery. Started on propofol and fentanyl infusions.  Chest x-ray performed and confirmed HD line in appropriate positioning, ETT in appropriate position, and no complications noted.  ABG performed which revealed a predominate metabolic acidosis and adequate oxygenation.  Pending RRT initiation.     Interval History:  Patient resting in bed, on ventilator and in no obvious discomfort.  All cranial nerve reflexes have returned (received paralytic in OR).  Got RRT overnight with improvement in acidosis and metabolic derangements.  SAT this AM resulted in no significant improvement in mental status.  Will hold off on  extubation primarily based on this, however she also is pending a return to the OR tomorrow as well.     Review of Systems   Unable to perform ROS: Acuity of condition     Objective:     Vital Signs (Most Recent):  Temp: 98.8 °F (37.1 °C) (02/01/24 1153)  Pulse: 98 (02/01/24 1153)  Resp: (!) 28 (02/01/24 1153)  BP: (!) 168/75 (02/01/24 1100)  SpO2: 96 % (02/01/24 1153) Vital Signs (24h Range):  Temp:  [97.5 °F (36.4 °C)-100.9 °F (38.3 °C)] 98.8 °F (37.1 °C)  Pulse:  [] 98  Resp:  [20-37] 28  SpO2:  [90 %-100 %] 96 %  BP: (123-177)/(61-83) 168/75  Arterial Line BP: (138-211)/(51-81) 166/60     Weight: (!) 149.7 kg (330 lb)  Body mass index is 53.26 kg/m².      Intake/Output Summary (Last 24 hours) at 2/1/2024 1219  Last data filed at 2/1/2024 1142  Gross per 24 hour   Intake 1848.32 ml   Output 1740 ml   Net 108.32 ml          Physical Exam  Constitutional:       General: She is not in acute distress.     Appearance: She is obese. She is ill-appearing. She is not toxic-appearing.   HENT:      Head: Normocephalic and atraumatic.      Nose: Nose normal.      Mouth/Throat:      Mouth: Mucous membranes are moist.   Eyes:      Extraocular Movements: Extraocular movements intact.      Pupils: Pupils are equal, round, and reactive to light.   Cardiovascular:      Rate and Rhythm: Normal rate and regular rhythm.   Pulmonary:      Effort: Pulmonary effort is normal.      Comments: Mild rales  Abdominal:      General: Abdomen is flat. There is no distension.      Palpations: Abdomen is soft.      Tenderness: There is no abdominal tenderness.   Musculoskeletal:         General: Normal range of motion.      Cervical back: Normal range of motion.      Right lower leg: No edema.      Left lower leg: No edema.   Skin:     General: Skin is warm and dry.      Comments: Wound to right groin to buttock   Neurological:      Mental Status: She is disoriented.      Comments: Sedated.          Vents:  Vent Mode: [facility] (02/01/24  1153)  Set Rate: 18 BPM (02/01/24 1153)  Vt Set: 420 mL (02/01/24 1153)  PEEP/CPAP: 5 cmH20 (02/01/24 1153)  Oxygen Concentration (%): 21 (02/01/24 1153)  Peak Airway Pressure: 9.4 cmH20 (02/01/24 1153)  Total Ve: 11.8 L/m (02/01/24 1153)  F/VT Ratio<105 (RSBI): (!) 70.35 (02/01/24 1153)    Lines/Drains/Airways       Central Venous Catheter Line  Duration             Trialysis (Dialysis) Catheter 01/31/24 1051 right internal jugular 1 day              Drain  Duration                  Urethral Catheter 01/29/24 2315 2 days              Airway  Duration                  Airway - Non-Surgical 01/31/24 1020 1 day              Arterial Line  Duration             Arterial Line 01/31/24 1025 Right Radial 1 day              Peripheral Intravenous Line  Duration                  Peripheral IV - Single Lumen 01/29/24 2116 Left Antecubital 2 days                    Significant Labs:    CBC/Anemia Profile:  Recent Labs   Lab 01/31/24  0519 02/01/24  0322   WBC 16.06* 18.15*   HGB 11.3* 9.0*   HCT 35.0* 26.1*    194   MCV 80* 76*   RDW 14.5 14.0          Chemistries:  Recent Labs   Lab 01/31/24  0520 01/31/24  0827 01/31/24  1558 02/01/24  0031 02/01/24  0322 02/01/24  0855   NA  --    < > 134* 138  --  137   K  --    < > 3.9 3.5  --  3.5   CL  --    < > 102 99  --  98   CO2  --    < > 17* 22*  --  23   BUN  --    < > 58* 35*  --  40*   CREATININE  --    < > 5.4* 4.0*  --  4.9*   CALCIUM  --    < > 7.5* 7.7*  --  7.7*   PHOS 3.7  --   --   --  4.1  --     < > = values in this interval not displayed.         All pertinent labs within the past 24 hours have been reviewed.    Significant Imaging:   I have reviewed all pertinent imaging results/findings within the past 24 hours.    ABG  Recent Labs   Lab 01/31/24  1348   PH 7.239*   PO2 99   PCO2 43.9   HCO3 18.8*   BE -8*     Assessment/Plan:     Renal/  * Ayaz's gangrene  Surgery following.  To OR on 1/30 and 1/31.  Not on pressors at this time.  Proteus growing on  preliminary cultures with susceptibilities showing resistance to minocycline.  ID following.  Continue meropenem/vanc.  Defer to ID judgement.  - wound care  - further intervention per surgery.  To OR tomorrow    Metabolic acidosis  Likely related to lactic acidosis an progressive renal dysfunction.  Nephrology performing RRT.  Surgery performing source control.    - RRT per nephrology.    Hyponatremia  Likely secondary to progressive renal dysfunction.  - improved following HD.    ALVARADO (acute kidney injury)  Nephrology following and performing RRT today.  HD line in place and confirmed ok to use.      ID  Severe sepsis  This patient does have evidence of infective focus  My overall impression is sepsis.  Source: Skin and Soft Tissue (location groin)  Antibiotics given-   Antibiotics (72h ago, onward)      Start     Stop Route Frequency Ordered    02/01/24 0900  meropenem (MERREM) 1 g in sodium chloride 0.9 % 100 mL IVPB (MB+)         -- IV Daily 01/31/24 1114    01/30/24 0945  mupirocin 2 % ointment         02/04/24 0859 Nasl 2 times daily 01/30/24 0831    01/29/24 1944  vancomycin - pharmacy to dose  (vancomycin IVPB (PEDS and ADULTS))        See Hyperspace for full Linked Orders Report.    -- IV pharmacy to manage frequency 01/29/24 1846          Latest lactate reviewed-  Recent Labs   Lab 01/29/24  1950 01/30/24  0042 01/31/24  0100   LACTATE  --    < > 2.4*   POCLAC 5.64*  --   --     < > = values in this interval not displayed.       Organ dysfunction indicated by Acute kidney injury, Acute respiratory failure, and Encephalopathy    Fluid challenge Not needed - patient is not hypotensive  (already resuscitated on presentation)    Post- resuscitation assessment No - Post resuscitation assessment not needed       Will Not start Pressors- Levophed for MAP of 65  Source control achieved by: surgical resection and antibiotic management.    Endocrine  Morbid obesity  Complicates all aspects of care.    Diabetic  acidosis without coma  Presented in DKA.  Now transitioned to subQ insulin regimen.  A1C 10.4.  newly diagnosed DM.  - continue SQ regimen with 12U detimir and ISS.  - may need to adjust this regimen depending on tube feeds tolerance.         Critical Care Daily Checklist:    A: Awake: RASS Goal/Actual Goal:    Actual:     B: Spontaneous Breathing Trial Performed?     C: SAT & SBT Coordinated?  Yes, not able to extubate today.                      D: Delirium: CAM-ICU     E: Early Mobility Performed? No   F: Feeding Goal: Goals: 1. Pt TF initiated by RD follow up  Status: Nutrition Goal Status: new   Current Diet Order   Procedures    Diet NPO      AS: Analgesia/Sedation Fent/prop   T: Thromboembolic Prophylaxis Heparin SQ   H: HOB > 300 Yes   U: Stress Ulcer Prophylaxis (if needed) pepcid   G: Glucose Control As above   B: Bowel Function     I: Indwelling Catheter (Lines & Meza) Necessity Trialysis, PIV, meza, ETT, a line   D: De-escalation of Antimicrobials/Pharmacotherapies Per ID    Plan for the day/ETD SAT/SBT. Return to OR chris AM    Code Status:  Family/Goals of Care: Full Code  recovery     Critical Care Time: 45 minutes  Critical secondary to Patient has a condition that poses threat to life and bodily function: Acute Renal Failure and Ayaz's Gangrene, metabolic encephalopathy necessitating mechanical ventilation.     Critical care was time spent personally by me on the following activities: development of treatment plan with patient or surrogate and bedside caregivers, discussions with consultants, evaluation of patient's response to treatment, examination of patient, ordering and performing treatments and interventions, ordering and review of laboratory studies, ordering and review of radiographic studies, pulse oximetry, re-evaluation of patient's condition. This critical care time did not overlap with that of any other provider or involve time for any procedures.     Levon Mayers MD  Critical Care  Medicine  [facility] - Intensive Care

## 2024-02-01 NOTE — ASSESSMENT & PLAN NOTE
"53F with h/o obesity (BMI 53) admitted with N/V and R groin wound found to be in DKA.  CT abdomen and pelvis showed extensive soft tissue air throughout the right groin and inguinal region and extending to involve the right lower quadrant anterior abdominal wall with extensive soft tissue air also seen involving the proximal medial aspect of the right thigh, concerning for gas-forming infection.  s/p OR debridement for nec fascitis. R groin tissue with proteus, susceptibilities pending.  On clindamycin, meropenem. ID consulted for "Abx management     Treating nec fascitis. Appreciate sgy help with source control. Suspect polymicrobial infection.     Recommendations:   - continue meropenem for now, renally dosed.    - f/u cultures  - defer to sgy need for additional debridment  - please consult ID at [facility] for final recs   "

## 2024-02-01 NOTE — HPI
Sarah Saravia is a 53 year old female with a past medical history of obesity (BMI 53) admitted with N/V and R groin wound found to be in DKA at OSH.  She underwent a CT abdomen and pelvis that showed extensive soft tissue air throughout the right groin and inguinal region and extending to involve the right lower quadrant anterior abdominal wall with extensive soft tissue air also seen involving the proximal medial aspect of the right thigh, concerning for gas-forming infection. She is s/p OR debridement for necrotizing fascitis on 1/29/24 and take back for 1/31/24. Right groin tissue growing proteus, susceptibilities still pending. Currently on meropenem and clindamycin which was d/c'd on 1/31/24. While at OSH pt developed acute respiratory failure requiring intubation. Pulmonology was consulted for ventilator management and critical illness. Nephrology was consulted for worsening vishal in the setting of lactic acidosis and septic shock likely ATN, sCr elevated at 3.8 on admit now 4.0 post HD. Pt being admitted to SICU for surgical critical care management. Immediate plans include hemodynamic stabilization, weaning of respiratory support, and RRT.

## 2024-02-02 ENCOUNTER — ANESTHESIA (OUTPATIENT)
Dept: SURGERY | Facility: HOSPITAL | Age: 54
DRG: 004 | End: 2024-02-02
Payer: MEDICAID

## 2024-02-02 PROBLEM — E11.9 NEW ONSET TYPE 2 DIABETES MELLITUS: Status: ACTIVE | Noted: 2024-02-02

## 2024-02-02 PROBLEM — G93.41 ENCEPHALOPATHY, METABOLIC: Status: ACTIVE | Noted: 2024-02-02

## 2024-02-02 PROBLEM — N17.0 ACUTE RENAL FAILURE WITH TUBULAR NECROSIS: Status: ACTIVE | Noted: 2024-02-02

## 2024-02-02 PROBLEM — Z99.11 ON MECHANICALLY ASSISTED VENTILATION: Status: ACTIVE | Noted: 2024-02-02

## 2024-02-02 PROBLEM — J96.01 ACUTE HYPOXEMIC RESPIRATORY FAILURE: Status: ACTIVE | Noted: 2024-02-02

## 2024-02-02 PROBLEM — D62 ACUTE BLOOD LOSS ANEMIA: Status: ACTIVE | Noted: 2024-02-02

## 2024-02-02 LAB
ABO + RH BLD: NORMAL
ABO + RH BLD: NORMAL
ALBUMIN SERPL BCP-MCNC: 1.4 G/DL (ref 3.5–5.2)
ALLENS TEST: ABNORMAL
ALP SERPL-CCNC: 130 U/L (ref 55–135)
ALT SERPL W/O P-5'-P-CCNC: 18 U/L (ref 10–44)
ANION GAP SERPL CALC-SCNC: 16 MMOL/L (ref 8–16)
AST SERPL-CCNC: 37 U/L (ref 10–40)
BACTERIA BLD CULT: NORMAL
BACTERIA BLD CULT: NORMAL
BACTERIA SPEC AEROBE CULT: ABNORMAL
BACTERIA SPEC AEROBE CULT: ABNORMAL
BASO STIPL BLD QL SMEAR: ABNORMAL
BASOPHILS # BLD AUTO: ABNORMAL K/UL (ref 0–0.2)
BASOPHILS # BLD AUTO: ABNORMAL K/UL (ref 0–0.2)
BASOPHILS NFR BLD: 0 % (ref 0–1.9)
BASOPHILS NFR BLD: 0 % (ref 0–1.9)
BILIRUB SERPL-MCNC: 0.4 MG/DL (ref 0.1–1)
BLD GP AB SCN CELLS X3 SERPL QL: NORMAL
BLD GP AB SCN CELLS X3 SERPL QL: NORMAL
BUN SERPL-MCNC: 26 MG/DL (ref 6–20)
BURR CELLS BLD QL SMEAR: ABNORMAL
CALCIUM SERPL-MCNC: 7.9 MG/DL (ref 8.7–10.5)
CHLORIDE SERPL-SCNC: 100 MMOL/L (ref 95–110)
CO2 SERPL-SCNC: 20 MMOL/L (ref 23–29)
CREAT SERPL-MCNC: 3.7 MG/DL (ref 0.5–1.4)
DACRYOCYTES BLD QL SMEAR: ABNORMAL
DELSYS: ABNORMAL
DIFFERENTIAL METHOD BLD: ABNORMAL
DIFFERENTIAL METHOD BLD: ABNORMAL
DOHLE BOD BLD QL SMEAR: PRESENT
DOHLE BOD BLD QL SMEAR: PRESENT
EOSINOPHIL # BLD AUTO: ABNORMAL K/UL (ref 0–0.5)
EOSINOPHIL # BLD AUTO: ABNORMAL K/UL (ref 0–0.5)
EOSINOPHIL NFR BLD: 0 % (ref 0–8)
EOSINOPHIL NFR BLD: 1 % (ref 0–8)
ERYTHROCYTE [DISTWIDTH] IN BLOOD BY AUTOMATED COUNT: 14.4 % (ref 11.5–14.5)
ERYTHROCYTE [DISTWIDTH] IN BLOOD BY AUTOMATED COUNT: 14.6 % (ref 11.5–14.5)
ERYTHROCYTE [SEDIMENTATION RATE] IN BLOOD BY WESTERGREN METHOD: 18 MM/H
EST. GFR  (NO RACE VARIABLE): 14 ML/MIN/1.73 M^2
FIO2: 80
GIANT PLATELETS BLD QL SMEAR: PRESENT
GIANT PLATELETS BLD QL SMEAR: PRESENT
GLUCOSE SERPL-MCNC: 208 MG/DL (ref 70–110)
HCO3 UR-SCNC: 22.6 MMOL/L (ref 24–28)
HCT VFR BLD AUTO: 25.4 % (ref 37–48.5)
HCT VFR BLD AUTO: 26.3 % (ref 37–48.5)
HGB BLD-MCNC: 8.8 G/DL (ref 12–16)
HGB BLD-MCNC: 9.1 G/DL (ref 12–16)
HYPOCHROMIA BLD QL SMEAR: ABNORMAL
HYPOCHROMIA BLD QL SMEAR: ABNORMAL
IMM GRANULOCYTES # BLD AUTO: ABNORMAL K/UL (ref 0–0.04)
IMM GRANULOCYTES # BLD AUTO: ABNORMAL K/UL (ref 0–0.04)
IMM GRANULOCYTES NFR BLD AUTO: ABNORMAL % (ref 0–0.5)
IMM GRANULOCYTES NFR BLD AUTO: ABNORMAL % (ref 0–0.5)
LYMPHOCYTES # BLD AUTO: ABNORMAL K/UL (ref 1–4.8)
LYMPHOCYTES # BLD AUTO: ABNORMAL K/UL (ref 1–4.8)
LYMPHOCYTES NFR BLD: 16 % (ref 18–48)
LYMPHOCYTES NFR BLD: 6 % (ref 18–48)
MAGNESIUM SERPL-MCNC: 1.7 MG/DL (ref 1.6–2.6)
MCH RBC QN AUTO: 26.5 PG (ref 27–31)
MCH RBC QN AUTO: 26.6 PG (ref 27–31)
MCHC RBC AUTO-ENTMCNC: 34.6 G/DL (ref 32–36)
MCHC RBC AUTO-ENTMCNC: 34.6 G/DL (ref 32–36)
MCV RBC AUTO: 77 FL (ref 82–98)
MCV RBC AUTO: 77 FL (ref 82–98)
METAMYELOCYTES NFR BLD MANUAL: 1 %
METAMYELOCYTES NFR BLD MANUAL: 2 %
MODE: ABNORMAL
MONOCYTES # BLD AUTO: ABNORMAL K/UL (ref 0.3–1)
MONOCYTES # BLD AUTO: ABNORMAL K/UL (ref 0.3–1)
MONOCYTES NFR BLD: 2 % (ref 4–15)
MONOCYTES NFR BLD: 3 % (ref 4–15)
MYELOCYTES NFR BLD MANUAL: 1 %
MYELOCYTES NFR BLD MANUAL: 2 %
NEUTROPHILS NFR BLD: 71 % (ref 38–73)
NEUTROPHILS NFR BLD: 84 % (ref 38–73)
NEUTS BAND NFR BLD MANUAL: 5 %
NEUTS BAND NFR BLD MANUAL: 6 %
NRBC BLD-RTO: 0 /100 WBC
NRBC BLD-RTO: 0 /100 WBC
OVALOCYTES BLD QL SMEAR: ABNORMAL
OVALOCYTES BLD QL SMEAR: ABNORMAL
PCO2 BLDA: 30.9 MMHG (ref 35–45)
PEEP: 5
PH SMN: 7.47 [PH] (ref 7.35–7.45)
PHOSPHATE SERPL-MCNC: 3.4 MG/DL (ref 2.7–4.5)
PLATELET # BLD AUTO: 215 K/UL (ref 150–450)
PLATELET # BLD AUTO: 216 K/UL (ref 150–450)
PLATELET BLD QL SMEAR: ABNORMAL
PLATELET BLD QL SMEAR: ABNORMAL
PMV BLD AUTO: 11.1 FL (ref 9.2–12.9)
PMV BLD AUTO: 11.6 FL (ref 9.2–12.9)
PO2 BLDA: 334 MMHG (ref 80–100)
POC BE: -1 MMOL/L
POC SATURATED O2: 100 % (ref 95–100)
POC TCO2: 23 MMOL/L (ref 23–27)
POCT GLUCOSE: 207 MG/DL (ref 70–110)
POCT GLUCOSE: 207 MG/DL (ref 70–110)
POCT GLUCOSE: 221 MG/DL (ref 70–110)
POCT GLUCOSE: 229 MG/DL (ref 70–110)
POCT GLUCOSE: 237 MG/DL (ref 70–110)
POCT GLUCOSE: 240 MG/DL (ref 70–110)
POCT GLUCOSE: 316 MG/DL (ref 70–110)
POIKILOCYTOSIS BLD QL SMEAR: SLIGHT
POIKILOCYTOSIS BLD QL SMEAR: SLIGHT
POLYCHROMASIA BLD QL SMEAR: ABNORMAL
POTASSIUM SERPL-SCNC: 3.5 MMOL/L (ref 3.5–5.1)
PROT SERPL-MCNC: 6 G/DL (ref 6–8.4)
RBC # BLD AUTO: 3.31 M/UL (ref 4–5.4)
RBC # BLD AUTO: 3.44 M/UL (ref 4–5.4)
SAMPLE: ABNORMAL
SITE: ABNORMAL
SODIUM SERPL-SCNC: 136 MMOL/L (ref 136–145)
SPECIMEN OUTDATE: NORMAL
SPECIMEN OUTDATE: NORMAL
SPHEROCYTES BLD QL SMEAR: ABNORMAL
SPHEROCYTES BLD QL SMEAR: ABNORMAL
TARGETS BLD QL SMEAR: ABNORMAL
TARGETS BLD QL SMEAR: ABNORMAL
TOXIC GRANULES BLD QL SMEAR: PRESENT
TOXIC GRANULES BLD QL SMEAR: PRESENT
VANCOMYCIN SERPL-MCNC: 20.3 UG/ML
VT: 420
WBC # BLD AUTO: 22.87 K/UL (ref 3.9–12.7)
WBC # BLD AUTO: 23.55 K/UL (ref 3.9–12.7)
WBC TOXIC VACUOLES BLD QL SMEAR: PRESENT
WBC TOXIC VACUOLES BLD QL SMEAR: PRESENT

## 2024-02-02 PROCEDURE — 83735 ASSAY OF MAGNESIUM: CPT

## 2024-02-02 PROCEDURE — 99222 1ST HOSP IP/OBS MODERATE 55: CPT | Mod: ,,, | Performed by: NURSE PRACTITIONER

## 2024-02-02 PROCEDURE — 94761 N-INVAS EAR/PLS OXIMETRY MLT: CPT

## 2024-02-02 PROCEDURE — 25000003 PHARM REV CODE 250: Performed by: HOSPITALIST

## 2024-02-02 PROCEDURE — 36000706: Performed by: STUDENT IN AN ORGANIZED HEALTH CARE EDUCATION/TRAINING PROGRAM

## 2024-02-02 PROCEDURE — 85027 COMPLETE CBC AUTOMATED: CPT | Performed by: HOSPITALIST

## 2024-02-02 PROCEDURE — 80202 ASSAY OF VANCOMYCIN: CPT | Performed by: HOSPITALIST

## 2024-02-02 PROCEDURE — 27100171 HC OXYGEN HIGH FLOW UP TO 24 HOURS

## 2024-02-02 PROCEDURE — 86850 RBC ANTIBODY SCREEN: CPT | Performed by: STUDENT IN AN ORGANIZED HEALTH CARE EDUCATION/TRAINING PROGRAM

## 2024-02-02 PROCEDURE — 63600175 PHARM REV CODE 636 W HCPCS

## 2024-02-02 PROCEDURE — 85007 BL SMEAR W/DIFF WBC COUNT: CPT | Performed by: HOSPITALIST

## 2024-02-02 PROCEDURE — 11046 DBRDMT MUSC&/FSCA EA ADDL: CPT | Mod: ,,, | Performed by: STUDENT IN AN ORGANIZED HEALTH CARE EDUCATION/TRAINING PROGRAM

## 2024-02-02 PROCEDURE — 63600175 PHARM REV CODE 636 W HCPCS: Performed by: NURSE PRACTITIONER

## 2024-02-02 PROCEDURE — 25000003 PHARM REV CODE 250: Performed by: NURSE PRACTITIONER

## 2024-02-02 PROCEDURE — 94003 VENT MGMT INPAT SUBQ DAY: CPT

## 2024-02-02 PROCEDURE — 20000000 HC ICU ROOM

## 2024-02-02 PROCEDURE — 25000003 PHARM REV CODE 250: Performed by: STUDENT IN AN ORGANIZED HEALTH CARE EDUCATION/TRAINING PROGRAM

## 2024-02-02 PROCEDURE — 25000003 PHARM REV CODE 250: Performed by: INTERNAL MEDICINE

## 2024-02-02 PROCEDURE — 99223 1ST HOSP IP/OBS HIGH 75: CPT | Mod: ,,, | Performed by: INTERNAL MEDICINE

## 2024-02-02 PROCEDURE — 36415 COLL VENOUS BLD VENIPUNCTURE: CPT | Performed by: STUDENT IN AN ORGANIZED HEALTH CARE EDUCATION/TRAINING PROGRAM

## 2024-02-02 PROCEDURE — 84100 ASSAY OF PHOSPHORUS: CPT

## 2024-02-02 PROCEDURE — 27201423 OPTIME MED/SURG SUP & DEVICES STERILE SUPPLY: Performed by: STUDENT IN AN ORGANIZED HEALTH CARE EDUCATION/TRAINING PROGRAM

## 2024-02-02 PROCEDURE — 99291 CRITICAL CARE FIRST HOUR: CPT | Mod: 24,,, | Performed by: STUDENT IN AN ORGANIZED HEALTH CARE EDUCATION/TRAINING PROGRAM

## 2024-02-02 PROCEDURE — 11043 DBRDMT MUSC&/FSCA 1ST 20/<: CPT | Mod: ,,, | Performed by: STUDENT IN AN ORGANIZED HEALTH CARE EDUCATION/TRAINING PROGRAM

## 2024-02-02 PROCEDURE — 97606 NEG PRS WND THER DME>50 SQCM: CPT | Mod: ,,, | Performed by: STUDENT IN AN ORGANIZED HEALTH CARE EDUCATION/TRAINING PROGRAM

## 2024-02-02 PROCEDURE — 37000009 HC ANESTHESIA EA ADD 15 MINS: Performed by: STUDENT IN AN ORGANIZED HEALTH CARE EDUCATION/TRAINING PROGRAM

## 2024-02-02 PROCEDURE — 63600175 PHARM REV CODE 636 W HCPCS: Mod: JZ,JG

## 2024-02-02 PROCEDURE — 99900026 HC AIRWAY MAINTENANCE (STAT)

## 2024-02-02 PROCEDURE — 99900035 HC TECH TIME PER 15 MIN (STAT)

## 2024-02-02 PROCEDURE — 63600175 PHARM REV CODE 636 W HCPCS: Mod: JZ,JG | Performed by: INTERNAL MEDICINE

## 2024-02-02 PROCEDURE — 63600175 PHARM REV CODE 636 W HCPCS: Performed by: HOSPITALIST

## 2024-02-02 PROCEDURE — 37000008 HC ANESTHESIA 1ST 15 MINUTES: Performed by: STUDENT IN AN ORGANIZED HEALTH CARE EDUCATION/TRAINING PROGRAM

## 2024-02-02 PROCEDURE — 80053 COMPREHEN METABOLIC PANEL: CPT

## 2024-02-02 PROCEDURE — D9220A PRA ANESTHESIA: Mod: ,,, | Performed by: ANESTHESIOLOGY

## 2024-02-02 PROCEDURE — 25000003 PHARM REV CODE 250

## 2024-02-02 PROCEDURE — 36000707: Performed by: STUDENT IN AN ORGANIZED HEALTH CARE EDUCATION/TRAINING PROGRAM

## 2024-02-02 PROCEDURE — 63600175 PHARM REV CODE 636 W HCPCS: Performed by: INTERNAL MEDICINE

## 2024-02-02 RX ORDER — INSULIN ASPART 100 [IU]/ML
0-10 INJECTION, SOLUTION INTRAVENOUS; SUBCUTANEOUS EVERY 4 HOURS PRN
Status: DISCONTINUED | OUTPATIENT
Start: 2024-02-02 | End: 2024-02-03

## 2024-02-02 RX ORDER — LABETALOL HCL 20 MG/4 ML
5 SYRINGE (ML) INTRAVENOUS EVERY 6 HOURS PRN
Status: DISCONTINUED | OUTPATIENT
Start: 2024-02-02 | End: 2024-02-02

## 2024-02-02 RX ORDER — PROPOFOL 10 MG/ML
VIAL (ML) INTRAVENOUS
Status: DISCONTINUED | OUTPATIENT
Start: 2024-02-02 | End: 2024-02-05

## 2024-02-02 RX ORDER — OXYCODONE HYDROCHLORIDE 5 MG/1
5 TABLET ORAL EVERY 6 HOURS PRN
Status: DISCONTINUED | OUTPATIENT
Start: 2024-02-02 | End: 2024-02-02

## 2024-02-02 RX ORDER — LABETALOL HCL 20 MG/4 ML
10 SYRINGE (ML) INTRAVENOUS ONCE
Status: COMPLETED | OUTPATIENT
Start: 2024-02-02 | End: 2024-02-02

## 2024-02-02 RX ORDER — METRONIDAZOLE 500 MG/100ML
500 INJECTION, SOLUTION INTRAVENOUS
Status: DISCONTINUED | OUTPATIENT
Start: 2024-02-02 | End: 2024-02-14

## 2024-02-02 RX ORDER — OXYCODONE HYDROCHLORIDE 10 MG/1
10 TABLET ORAL EVERY 6 HOURS PRN
Status: DISCONTINUED | OUTPATIENT
Start: 2024-02-02 | End: 2024-02-02

## 2024-02-02 RX ORDER — LABETALOL HCL 20 MG/4 ML
10 SYRINGE (ML) INTRAVENOUS EVERY 6 HOURS PRN
Status: DISCONTINUED | OUTPATIENT
Start: 2024-02-02 | End: 2024-03-05 | Stop reason: HOSPADM

## 2024-02-02 RX ORDER — HALOPERIDOL 5 MG/ML
0.5 INJECTION INTRAMUSCULAR EVERY 10 MIN PRN
Status: CANCELLED | OUTPATIENT
Start: 2024-02-02

## 2024-02-02 RX ORDER — POLYETHYLENE GLYCOL 3350 17 G/17G
17 POWDER, FOR SOLUTION ORAL DAILY
Status: DISCONTINUED | OUTPATIENT
Start: 2024-02-02 | End: 2024-02-05

## 2024-02-02 RX ORDER — HYDROMORPHONE HYDROCHLORIDE 1 MG/ML
0.2 INJECTION, SOLUTION INTRAMUSCULAR; INTRAVENOUS; SUBCUTANEOUS EVERY 5 MIN PRN
Status: CANCELLED | OUTPATIENT
Start: 2024-02-02

## 2024-02-02 RX ORDER — HYDROMORPHONE HYDROCHLORIDE 1 MG/ML
0.5 INJECTION, SOLUTION INTRAMUSCULAR; INTRAVENOUS; SUBCUTANEOUS
Status: DISCONTINUED | OUTPATIENT
Start: 2024-02-02 | End: 2024-02-05

## 2024-02-02 RX ORDER — SODIUM CHLORIDE 0.9 % (FLUSH) 0.9 %
10 SYRINGE (ML) INJECTION
Status: CANCELLED | OUTPATIENT
Start: 2024-02-02

## 2024-02-02 RX ORDER — GLUCAGON 1 MG
1 KIT INJECTION
Status: DISCONTINUED | OUTPATIENT
Start: 2024-02-02 | End: 2024-02-03

## 2024-02-02 RX ORDER — OXYCODONE HYDROCHLORIDE 5 MG/1
5 TABLET ORAL EVERY 6 HOURS PRN
Status: DISCONTINUED | OUTPATIENT
Start: 2024-02-02 | End: 2024-02-05

## 2024-02-02 RX ORDER — HEPARIN SODIUM 5000 [USP'U]/ML
7500 INJECTION, SOLUTION INTRAVENOUS; SUBCUTANEOUS EVERY 8 HOURS
Status: DISCONTINUED | OUTPATIENT
Start: 2024-02-02 | End: 2024-02-14

## 2024-02-02 RX ORDER — INSULIN ASPART 100 [IU]/ML
0-10 INJECTION, SOLUTION INTRAVENOUS; SUBCUTANEOUS EVERY 6 HOURS PRN
Status: DISCONTINUED | OUTPATIENT
Start: 2024-02-02 | End: 2024-02-02

## 2024-02-02 RX ORDER — ONDANSETRON HYDROCHLORIDE 2 MG/ML
4 INJECTION, SOLUTION INTRAVENOUS DAILY PRN
Status: CANCELLED | OUTPATIENT
Start: 2024-02-02

## 2024-02-02 RX ADMIN — MEROPENEM 1 G: 1 INJECTION INTRAVENOUS at 12:02

## 2024-02-02 RX ADMIN — CHLORHEXIDINE GLUCONATE 0.12% ORAL RINSE 15 ML: 1.2 LIQUID ORAL at 09:02

## 2024-02-02 RX ADMIN — LABETALOL HYDROCHLORIDE 10 MG: 5 INJECTION, SOLUTION INTRAVENOUS at 02:02

## 2024-02-02 RX ADMIN — FAMOTIDINE 20 MG: 10 INJECTION, SOLUTION INTRAVENOUS at 09:02

## 2024-02-02 RX ADMIN — HEPARIN SODIUM 7500 UNITS: 5000 INJECTION INTRAVENOUS; SUBCUTANEOUS at 10:02

## 2024-02-02 RX ADMIN — INSULIN DETEMIR 16 UNITS: 100 INJECTION, SOLUTION SUBCUTANEOUS at 08:02

## 2024-02-02 RX ADMIN — PROPOFOL 100 MG: 10 INJECTION, EMULSION INTRAVENOUS at 01:02

## 2024-02-02 RX ADMIN — LABETALOL HYDROCHLORIDE 5 MG: 5 INJECTION, SOLUTION INTRAVENOUS at 01:02

## 2024-02-02 RX ADMIN — HYDROMORPHONE HYDROCHLORIDE 0.5 MG: 0.5 INJECTION, SOLUTION INTRAMUSCULAR; INTRAVENOUS; SUBCUTANEOUS at 02:02

## 2024-02-02 RX ADMIN — HEPARIN SODIUM 5000 UNITS: 5000 INJECTION INTRAVENOUS; SUBCUTANEOUS at 06:02

## 2024-02-02 RX ADMIN — MUPIROCIN: 20 OINTMENT TOPICAL at 09:02

## 2024-02-02 RX ADMIN — INSULIN ASPART 4 UNITS: 100 INJECTION, SOLUTION INTRAVENOUS; SUBCUTANEOUS at 08:02

## 2024-02-02 RX ADMIN — METRONIDAZOLE 500 MG: 5 INJECTION, SOLUTION INTRAVENOUS at 05:02

## 2024-02-02 RX ADMIN — CHLORHEXIDINE GLUCONATE 0.12% ORAL RINSE 15 ML: 1.2 LIQUID ORAL at 08:02

## 2024-02-02 RX ADMIN — METRONIDAZOLE 500 MG: 5 INJECTION, SOLUTION INTRAVENOUS at 10:02

## 2024-02-02 RX ADMIN — MUPIROCIN: 20 OINTMENT TOPICAL at 08:02

## 2024-02-02 RX ADMIN — HYDRALAZINE HYDROCHLORIDE 10 MG: 20 INJECTION, SOLUTION INTRAMUSCULAR; INTRAVENOUS at 03:02

## 2024-02-02 RX ADMIN — CEFTRIAXONE 2 G: 2 INJECTION, POWDER, FOR SOLUTION INTRAMUSCULAR; INTRAVENOUS at 09:02

## 2024-02-02 RX ADMIN — INSULIN ASPART 4 UNITS: 100 INJECTION, SOLUTION INTRAVENOUS; SUBCUTANEOUS at 12:02

## 2024-02-02 RX ADMIN — HEPARIN SODIUM 7500 UNITS: 5000 INJECTION INTRAVENOUS; SUBCUTANEOUS at 02:02

## 2024-02-02 RX ADMIN — INSULIN ASPART 2 UNITS: 100 INJECTION, SOLUTION INTRAVENOUS; SUBCUTANEOUS at 06:02

## 2024-02-02 RX ADMIN — INSULIN ASPART 4 UNITS: 100 INJECTION, SOLUTION INTRAVENOUS; SUBCUTANEOUS at 05:02

## 2024-02-02 NOTE — NURSING
[facility]  Nurses Note -- 4 Eyes      2/1/2024       Skin assessed on: Q Shift      [] No Pressure Injuries Present    []Prevention Measures Documented    [x] Yes LDA  for Pressure Injury Previously documented     [] Yes New Pressure Injury Discovered   [] LDA for New Pressure Injury Added      Attending RN:  Melonie Acharya RN     Second RN:  Paramjit Powers RN

## 2024-02-02 NOTE — PROGRESS NOTES
[facility] - Surgical Intensive Care  General Surgery  Progress Note    Subjective:     History of Present Illness:  53 year old morbidly obese female who does not routinely go to the doctor presents to the ED with malaise, nausea, vomiting, and groin, buttock, perineal swelling and tenderness. She began feeling ill a few days ago.     On arrival to the ED she is tachycardic to 120, hypertensive without fever. Labs demonstrate leukocytosis of 21, , with lactic acidosis and associated ALVARADO with cr 3.8, hyperglycemia with blood glucose of 824 accompanied by severe electrolyte derangements. CT imaging obtained demonstrates diffuse subcutaneous air along buttocks, perineum, anterior vulva, as well as bilateral thighs.     No prior abdominal surgeries. She denies problems with her heart or lungs. Non smoker. Does not routinely take any medications.    Post-Op Info:  Procedure(s) (LRB):  IRRIGATION & DEBRIDEMENT, WOUND DEBRIDEMENT (Right)  INSERTION, CATHETER, TRIPLE LUMEN, HEMODIALYSIS, TEMPORARY (Right)   2 Days Post-Op     Interval History: Transfer from [facility] overnight. Intubated. Sedation paused. Following commands. HDS.     Medications:  Continuous Infusions:  Scheduled Meds:   sodium chloride 0.9%   Intravenous Once    sodium chloride 0.9%   Intravenous Once    cefTRIAXone (Rocephin) IV (PEDS and ADULTS)  2 g Intravenous Q24H    chlorhexidine  15 mL Mouth/Throat BID    famotidine (PF)  20 mg Intravenous Daily    heparin (porcine)  7,500 Units Subcutaneous Q8H    insulin detemir U-100  12 Units Subcutaneous QHS    mupirocin   Nasal BID    polyethylene glycol  17 g Per NG tube Daily     PRN Meds:sodium chloride 0.9%, sodium chloride 0.9%, acetaminophen, albuterol-ipratropium, dextrose 10%, fentanyl, glucagon (human recombinant), hydrALAZINE, insulin aspart U-100, labetalol, melatonin, naloxone, ondansetron, oxyCODONE, potassium chloride **AND** potassium chloride **AND** potassium chloride, prochlorperazine,  sodium chloride 0.9%, sodium chloride 0.9%, sodium chloride 0.9%     Review of patient's allergies indicates:  No Known Allergies  Objective:     Vital Signs (Most Recent):  Temp: 99.8 °F (37.7 °C) (02/02/24 0701)  Pulse: 105 (02/02/24 0739)  Resp: (!) 25 (02/02/24 0739)  BP: (!) 142/65 (02/02/24 0701)  SpO2: 100 % (02/02/24 0739) Vital Signs (24h Range):  Temp:  [98.2 °F (36.8 °C)-99.9 °F (37.7 °C)] 99.8 °F (37.7 °C)  Pulse:  [] 105  Resp:  [8-31] 25  SpO2:  [92 %-100 %] 100 %  BP: (142-186)/(65-85) 142/65  Arterial Line BP: (128-200)/(56-74) 140/65     Weight: (!) 164.5 kg (362 lb 10.5 oz)  Body mass index is 58.53 kg/m².    Intake/Output - Last 3 Shifts         01/31 0700  02/01 0659 02/01 0700  02/02 0659 02/02 0700  02/03 0659    P.O.       I.V. (mL/kg) 1401.2 (9.3) 460.4 (2.8)     Other 0 0     IV Piggyback 898.7 349.4 33.3    Total Intake(mL/kg) 2299.9 (15.3) 809.7 (4.9) 33.3 (0.2)    Urine (mL/kg/hr) 140 (0) 50 (0) 0 (0)    Drains  150 50    Other 1500 2000     Blood 100      Total Output 1740 2200 50    Net +559.9 -1390.3 -16.7                    Physical Exam  Vitals reviewed.   Constitutional:       General: She is not in acute distress.     Appearance: She is obese. She is ill-appearing.   HENT:      Head: Normocephalic.   Neck:      Comments: Right IJ trialysis   Cardiovascular:      Rate and Rhythm: Regular rhythm. Tachycardia present.      Pulses: Normal pulses.   Pulmonary:      Effort: Pulmonary effort is normal.      Comments: Mechanically ventilated   Abdominal:      Palpations: Abdomen is soft.      Tenderness: There is no abdominal tenderness.   Genitourinary:     Comments: Glynn in place with clear yellow urine   Musculoskeletal:      Comments: Dressings C/D/I. Surrounding skin soft edematous, no crepitus    Skin:     General: Skin is warm and dry.   Neurological:      General: No focal deficit present.          Significant Labs:  I have reviewed all pertinent lab results within the past  24 hours.  CBC:   Recent Labs   Lab 02/02/24  0335   WBC 22.87*   RBC 3.31*   HGB 8.8*   HCT 25.4*      MCV 77*   MCH 26.6*   MCHC 34.6     CMP:   Recent Labs   Lab 02/02/24  0335   *   CALCIUM 7.9*   ALBUMIN 1.4*   PROT 6.0      K 3.5   CO2 20*      BUN 26*   CREATININE 3.7*   ALKPHOS 130   ALT 18   AST 37   BILITOT 0.4       Significant Diagnostics:  I have reviewed all pertinent imaging results/findings within the past 24 hours.  Assessment/Plan:     * Ayaz's gangrene  53 y.o. 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     - OR today for debridement with possible closure and wound vac placement  - Will obtain informed consent   - Okay to change overlying dressings (ABD pads), keep kerlix packing in place   - Daily SBTs  - ID consult for antibiotic management   Wound cx growing proteus mirabilis. Sensitive to Rocephin  - Endocrine consult for DM management  - Nephrology consult for HD management  - Okay for DVT ppx   - Remainder of care per SICU        Marisa Fung MD  General Surgery  [facility] - Surgical Intensive Care

## 2024-02-02 NOTE — OP NOTE
[facility] - Surgical Intensive Care  General Surgery  Operative Note    SUMMARY     Date of Procedure: 2/2/2024     Procedure: Procedure(s) (LRB):  Excisional sharp debridement of skin, subcutaneous tissue, and muscle of the right groin measuring 14 x 7cm  Excisional sharp debridement of skin, subcutaneous tissue, and muscle of the right thigh and buttocks measuring 35 x 12cm   Negative pressure wound therapy placement >50cm^2 to right groin  Negative pressure wound therapy placement >50cm^2 to right thigh    Surgeon(s) and Role:     * Steve Cole MD - Primary     * Marisa Fung MD - Resident - Assisting     * Celia Allison MD - Resident - Assisting    Pre-Operative Diagnosis: 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]    Post-Operative Diagnosis: Post-Op Diagnosis Codes:     * 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]    Anesthesia: Choice      Indication: Sarah Saravia is a 53 y.o. who was admitted to SICU as a transfer for Ayaz's gangrene of the right proximal medial thigh s/p OR debridement x2. She was taken to the OR today for repeat washout and wound vac placement    Procedure in Detail:   The patient was preoperatively evaluated and consent was obtained from [facility]. She was taken back to the operating room, placed on the operating table, and monitors were applied. She then underwent the smooth induction of anesthesia. The right buttock and groin was prepped and draped in the usual sterile fashion. A time out was performed prior to the start of the case. When all were in agreement, we proceeded with the case. The wound was evaluated for any necrotic or grossly infected tissue. Using both sharp and electrocautery, any necrotic tissue was dissected until bleeding tissue was present. Care was taken not to dissect any healthy tissue. After adequate debridement of both the groin and buttock we measured both areas to be 14 x 4 x 7 cm (groin) and 35 x 12 x 4 cm (buttock). Both wound were hemostatic at the end of the case. A wound vac  was then placed over both wounds and y-ed together. Adequate suction/seal was achieved. Patient was transported to the SICU intubated and in stable condition. Dr. Cole was present for oshea portions of the case.         Complications: No    Estimated Blood Loss (EBL): * No values recorded between 2/2/2024  1:43 PM and 2/2/2024  2:37 PM *           Implants: * No implants in log *    Specimens:   Specimen (24h ago, onward)      None                    Condition: Serious    Disposition: ICU - intubated and hemodynamically stable.    Attestation: Dr. Cole was present and scrubbed for all key portions of the case.    Celia Allison MD  General Surgery   PGY-2      .   Thalidomide Counseling: I discussed with the patient the risks of thalidomide including but not limited to birth defects, anxiety, weakness, chest pain, dizziness, cough and severe allergy.

## 2024-02-02 NOTE — CARE UPDATE
Patient returns from the OR s/p wound vac placement of right groin/leg wound. Wound was clean, no further debridement done. Received 50 prop intra-op. Returns intubated without sedation, hypertensive with SBP > 200. NSR.     Chirag Borges MD   General Surgery PGY2

## 2024-02-02 NOTE — ASSESSMENT & PLAN NOTE
Currently requiring renal replacement therapy.  Management per nephrology.  Renally dose antibiotics if indicated.

## 2024-02-02 NOTE — ASSESSMENT & PLAN NOTE
Lab Results   Component Value Date    CREATININE 3.7 (H) 02/02/2024     Avoid insulin stacking  Titrate insulin slowly

## 2024-02-02 NOTE — CLINICAL REVIEW
IP Sepsis Screen (most recent)       Sepsis Screen (IP) - 02/01/24 2620       Is the patient's history or complaint suggestive of a possible infection? Yes  -DD    Are there at least two of the following signs and symptoms present? Yes  -DD    Sepsis signs/symptoms - Tachycardia Tachycardia     >90  -DD    Sepsis signs/symptoms - Tachypnea Tachypnea     >20  -DD    Sepsis signs/symptoms - WBC WBC < 4,000 or WBC > 12,000  -DD    Are any of the following organ dysfunction criteria present and not considered to be due to a chronic condition? Yes  -DD    Organ Dysfunction Criteria Creatinine > 2.0  -DD    Organ Dysfunction Criteria Lactate > 2.0  -DD    Organ Dysfunction Criteria - Resp Comp Respiratory Compromise: Requiring > 5L NC  -DD    Initiate Sepsis Protocol No  -DD    Reason sepsis not considered Pt. receiving appropriate management  -DD              User Key  (r) = Recorded By, (t) = Taken By, (c) = Cosigned By      Initials Name    Flash Freire, ELISABETH

## 2024-02-02 NOTE — ASSESSMENT & PLAN NOTE
Patient with Hypoxic Respiratory failure which is Acute.  she is not on home oxygen. Supplemental oxygen was provided and noted- Vent Mode: A/C  Oxygen Concentration (%):  [] 40  Resp Rate Total:  [19 br/min-28 br/min] 23 br/min  Vt Set:  [420 mL] 420 mL  PEEP/CPAP:  [5 cmH20] 5 cmH20  Mean Airway Pressure:  [6.8 cmH20-10 cmH20] 10 cmH20    .   Signs/symptoms of respiratory failure include- tachypnea, increased work of breathing, respiratory distress, use of accessory muscles, lethargy, and cyanosis. Contributing diagnoses includes - ARDS, Obesity Hypoventilation, and Pneumonia Labs and images were reviewed. Patient Has recent ABG, which has been reviewed. Will treat underlying causes and adjust management of respiratory failure as follows-

## 2024-02-02 NOTE — PLAN OF CARE
SICU PLAN OF CARE NOTE    Dx: Ayaz's gangrene    Goals of Care: SBP <180, MAP >65    Vital Signs (last 12 hours):   Temp:  [98.2 °F (36.8 °C)-99.9 °F (37.7 °C)]   Pulse:  []   Resp:  [22-27]   BP: (158-161)/(73-77)   SpO2:  [97 %-100 %]   Arterial Line BP: (128-192)/(60-74)      Neuro: responds to pain     Cardiac: sinus tachycardia     Respiratory: Ventilator (40%, 5 PEEP)    Urine Output: Urinary Catheter 50 cc/shift    Drains: NG/OG Tube 150 cc /  shift    Diet: NPO     Labs/Accuchecks: all labs trended and reviewed, Q6 accuchecks     Skin:  All skin remains free from new  injury, foams and heel boots in place, R. Groin dressing-intact, patient turned Q2,  mattress inflated, and bed working correctly.    Shift Events:  Plan of care, VSS, no s/s of distress, bed in lowest position, side rails x2. See flowsheet for further assessment/details.  Family updated on current condition/plan of care, questions answered, and emotional support provided.  MD updated on current condition, vitals, labs, and gtts.  No new orders received, will continue to monitor.

## 2024-02-02 NOTE — H&P
[facility] - Surgical Intensive Care  Critical Care - Surgery  History & Physical    Patient Name: Sarah Saravia  MRN: [id number]  Admission Date: 1/29/2024  Code Status: Full Code  Attending Physician: Douglas  Primary Care Provider: [facility]   Principal Problem: Ayaz's gangrene    Subjective:     HPI:  Sarah Saravia is a 53 year old female with a past medical history of obesity (BMI 53) admitted with N/V and R groin wound found to be in DKA at OSH.  She underwent a CT abdomen and pelvis that showed extensive soft tissue air throughout the right groin and inguinal region and extending to involve the right lower quadrant anterior abdominal wall with extensive soft tissue air also seen involving the proximal medial aspect of the right thigh, concerning for gas-forming infection. She is s/p OR debridement for necrotizing fascitis on 1/29/24 and take back for 1/31/24. Right groin tissue growing proteus, susceptibilities still pending. Currently on meropenem and clindamycin which was d/c'd on 1/31/24. While at OSH pt developed acute respiratory failure requiring intubation. Pulmonology was consulted for ventilator management and critical illness. Nephrology was consulted for worsening vishal in the setting of lactic acidosis and septic shock likely ATN, sCr elevated at 3.8 on admit now 4.0 post HD. Pt being admitted to SICU for surgical critical care management. Immediate plans include hemodynamic stabilization, weaning of respiratory support, and RRT.          Follow-up For: Procedure(s) (LRB):  IRRIGATION & DEBRIDEMENT, WOUND DEBRIDEMENT (Right)  INSERTION, CATHETER, TRIPLE LUMEN, HEMODIALYSIS, TEMPORARY (Right)    Post-Operative Day: 1 Day Post-Op     History reviewed. No pertinent past medical history.    Past Surgical History:   Procedure Laterality Date    INCISION AND DRAINAGE OF PERIRECTAL REGION N/A 1/29/2024    Procedure: INCISION AND DRAINAGE, PERIRECTAL REGION;  Surgeon:  Axel Ramsay MD;  Location: [facility] OR;  Service: General;  Laterality: N/A;    PLACEMENT, TRIALYSIS CATH Right 1/31/2024    Procedure: INSERTION, CATHETER, TRIPLE LUMEN, HEMODIALYSIS, TEMPORARY;  Surgeon: Alvin Junior MD;  Location: [facility] OR;  Service: General;  Laterality: Right;    WOUND EXPLORATION Right 1/31/2024    Procedure: IRRIGATION & DEBRIDEMENT, WOUND DEBRIDEMENT;  Surgeon: Alvin Junior MD;  Location: [facility] OR;  Service: General;  Laterality: Right;       Review of patient's allergies indicates:  No Known Allergies    Family History    None       Tobacco Use    Smoking status: Not on file    Smokeless tobacco: Not on file   Substance and Sexual Activity    Alcohol use: Not on file    Drug use: Not on file    Sexual activity: Not on file      Review of Systems   Reason unable to perform ROS: Sedation.     Objective:     Vital Signs (Most Recent):  Temp: 99.8 °F (37.7 °C) (02/01/24 2237)  Pulse: 103 (02/01/24 2242)  Resp: (!) 26 (02/01/24 2242)  BP: (!) 161/74 (02/01/24 2100)  SpO2: 100 % (02/01/24 2242) Vital Signs (24h Range):  Temp:  [98.8 °F (37.1 °C)-100.9 °F (38.3 °C)] 99.8 °F (37.7 °C)  Pulse:  [] 103  Resp:  [8-34] 26  SpO2:  [92 %-100 %] 100 %  BP: (143-186)/(65-85) 161/74  Arterial Line BP: (143-200)/(51-73) 171/63     Weight: (!) 149.7 kg (330 lb)  Body mass index is 53.26 kg/m².      Intake/Output Summary (Last 24 hours) at 2/1/2024 2252  Last data filed at 2/1/2024 2150  Gross per 24 hour   Intake 884.7 ml   Output 2015 ml   Net -1130.3 ml          Physical Exam  Vitals reviewed.   Constitutional:       Appearance: She is obese.   HENT:      Head: Atraumatic.   Cardiovascular:      Rate and Rhythm: Tachycardia present.      Pulses: Normal pulses.   Pulmonary:      Comments: Intubated   Abdominal:      General: Abdomen is flat. Bowel sounds are normal.      Palpations: Abdomen is soft.   Musculoskeletal:         General: Normal range of motion.   Skin:     Capillary Refill:  Capillary refill takes less than 2 seconds.      Comments: Rt groin wound that is packed   Neurological:      General: No focal deficit present.   Psychiatric:         Mood and Affect: Mood normal.            Vents:  Vent Mode: A/C (02/01/24 2242)  Set Rate: 18 BPM (02/01/24 2242)  Vt Set: 420 mL (02/01/24 2242)  PEEP/CPAP: 5 cmH20 (02/01/24 2242)  Oxygen Concentration (%): 100 (02/01/24 2242)  Peak Airway Pressure: 17 cmH20 (02/01/24 2242)  Plateau Pressure: 0 cmH20 (02/01/24 2242)  Total Ve: 11.5 L/m (02/01/24 2242)  Negative Inspiratory Force (cm H2O): 0 (02/01/24 2242)  F/VT Ratio<105 (RSBI): (!) 58.17 (02/01/24 2242)    Lines/Drains/Airways       Central Venous Catheter Line  Duration             Trialysis (Dialysis) Catheter 01/31/24 1051 right internal jugular 1 day              Drain  Duration                  Urethral Catheter 01/29/24 2315 2 days              Airway  Duration                  Airway - Non-Surgical 01/31/24 1020 1 day              Arterial Line  Duration             Arterial Line 01/31/24 1025 Right Radial 1 day              Peripheral Intravenous Line  Duration                  Peripheral IV - Single Lumen 01/29/24 2116 Left Antecubital 3 days                    Significant Labs:    CBC/Anemia Profile:  Recent Labs   Lab 01/31/24  0519 02/01/24 0322   WBC 16.06* 18.15*   HGB 11.3* 9.0*   HCT 35.0* 26.1*    194   MCV 80* 76*   RDW 14.5 14.0        Chemistries:  Recent Labs   Lab 01/31/24  0520 01/31/24  0827 02/01/24  0031 02/01/24  0322 02/01/24  0855 02/01/24  1933   NA  --    < > 138  --  137 138   K  --    < > 3.5  --  3.5 3.4*   CL  --    < > 99  --  98 98   CO2  --    < > 22*  --  23 27   BUN  --    < > 35*  --  40* 22*   CREATININE  --    < > 4.0*  --  4.9* 3.2*   CALCIUM  --    < > 7.7*  --  7.7* 8.2*   PHOS 3.7  --   --  4.1  --   --     < > = values in this interval not displayed.           Significant Imaging:   Reviewed  Assessment/Plan:   * Ayaz's gangrene     Neuro/Psych:   -- Sedation: propofol and fentanyl gtt  -- Weaning sedation as tolerated for neuro exam  -- Pain: fentanyl gtt             Cards:   -- goal SBP < 180, MAP >65  -- PRN labetalol      Pulm:   #Acute Respiratory Failure  -- Intubated  -- Daily SATs/SBTs  -- Obtain baseline CXR and ABG  -- Goal O2 sat > 90%  -- Wean as able      Renal:  #ALVARADO on CKD  #Lactic Acidosis  Initial Cr 3.9 and uptrending. Received HD 2/1. Etiology unknown between pre-renal and intrarenal dysfunction.  -- Per renal recs from OSH, continue NaHCO3 in D5W 150cc/hr  -- Consult nephrology in AM; appreciate recs  -- Keep meza for strict I/O  -- Urine output/24h: 25cc in last 24hrs  Recent Labs   Lab 01/31/24  1558 02/01/24  0031 02/01/24  0855   BUN 58* 35* 40*   CREATININE 5.4* 4.0* 4.9*      FEN / GI:   -- NGT placed  -- Daily CMPs  -- Replace lytes as needed  -- Nutrition: NPO      ID:    #Ayaz's gangrene  CT abdomen and pelvis showed extensive soft tissue air throughout the right groin and inguinal region and extending to involve the right lower quadrant anterior abdominal wall with extensive soft tissue air also seen involving the proximal medial aspect of the right thigh, concerning for gas-forming infection.  s/p OR debridement for nec fascitis. R groin tissue with proteus  -- Per OSH, continue merem  -- F/u suscepitbilies from OSH  -- Consult ID in AM; appreciate recs  -- Wound repacked at bedside; daily wound dressing changes  Recent Labs   Lab 01/30/24 0447 01/31/24  0519 02/01/24  0322   WBC 14.73* 16.06* 18.15*       Heme/Onc:  -- Daily CBC  Recent Labs   Lab 01/30/24  0447 01/31/24  0519 02/01/24  0322   HGB 12.0 11.3* 9.0*    197 194       Endo:   #IDDM  Initially presented to OSH with DKA with latest A1C 10.4 AG Gap resolved. Started on 12u insulin.  - continue SQ regimen with 12U detimir and SSI      PPx:   Feeding: NPO  Analgesia/Sedation: fentanyl gtt / fentanyl gtt, propofol gtt  Thromboembolic  prevention: SQH   HOB >30: Y  Stress Ulcer ppx: Famotidine  Glucose control: Critical care goal 140-180 g/dl, SSI**  Bowel reg: N/A  Invasive Lines/Drains/Airway: Glynn, ETT, Art line, Trialysis, PIV x2, NGT  Deescalation: merem        Dispo/Code Status/Palliative:   -- SICU / Full Code        David Stokes MD  Critical Care - Surgery  [facility] - Surgical Intensive Care

## 2024-02-02 NOTE — NURSING
Patient transferred per EMS to main campus on cardiac monitoring with continuous GTT of fentanyl and propofol.. Significant other at bedside and aware of transfer.

## 2024-02-02 NOTE — ASSESSMENT & PLAN NOTE
Neuro/Psych:   -- Sedation: None  -- Pain: Fent PRN             Cards:   -- HDS  -- goal SBP < 180, MAP >65      Pulm:   #Acute Respiratory Failure  -- Intubated  -- CXR - edematous   -- Goal O2 sat > 90%  -- Wean as able      Renal:  #ALVARADO on CKD  Received HD 2/1. ATN.   -- Mildly alkalotic. Normal lactic. Anuric.  -- Consult nephrology; appreciate recs  -- Keep meza for strict I/O  -- Urine output/24h: 25cc  Recent Labs   Lab 02/01/24 1933 02/01/24 2242 02/02/24  0335   BUN 22* 25* 26*   CREATININE 3.2* 3.5* 3.7*        FEN / GI:   -- Daily CMPs  -- NGT in place   -- Replace lytes as needed  -- Nutrition: NPO  -- GI PPX       ID:    #Ayaz's gangrene  s/p OR debridement for nec fascitis. R groin tissue with proteus  -- continue merepem d/c vanc  -- Consult ID; appreciate recs  Recent Labs   Lab 02/01/24 0322 02/01/24 2242 02/02/24  0335   WBC 18.15* 23.55* 22.87*        Heme/Onc:  -- Daily CBC  -- Heparin DVT ppx  Recent Labs   Lab 02/01/24 0322 02/01/24 2242 02/01/24 2246 02/02/24  0335   HGB 9.0* 9.1*  --  8.8*    215  --  216   APTT  --   --  27.8  --    INR  --   --  1.0  --         Endo:   #IDDM  Initially presented to OSH with DKA with latest A1C 10.4 AG Gap resolved. Started on 12u insulin.  - continue SQ regimen with 12U detimir and SSI  - Endocrine consulted for assistance. Appreciate help.         PPx:   Feeding: NPO  Analgesia/Sedation: See above  Thromboembolic prevention: SQH   HOB >30: Y  Stress Ulcer ppx: Famotidine  Glucose control: Critical care goal 140-180 g/dl, SSI**  Bowel reg: N/A  Invasive Lines/Drains/Airway: Meza, ETT, Art line, Trialysis, PIV x2      Dispo/Code Status/Palliative:   -- SICU / Full Code

## 2024-02-02 NOTE — SUBJECTIVE & OBJECTIVE
History reviewed. No pertinent past medical history.    Past Surgical History:   Procedure Laterality Date    INCISION AND DRAINAGE OF PERIRECTAL REGION N/A 1/29/2024    Procedure: INCISION AND DRAINAGE, PERIRECTAL REGION;  Surgeon: Axel Ramsay MD;  Location: [facility] OR;  Service: General;  Laterality: N/A;    PLACEMENT, TRIALYSIS CATH Right 1/31/2024    Procedure: INSERTION, CATHETER, TRIPLE LUMEN, HEMODIALYSIS, TEMPORARY;  Surgeon: Alvin Junior MD;  Location: [facility] OR;  Service: General;  Laterality: Right;    WOUND EXPLORATION Right 1/31/2024    Procedure: IRRIGATION & DEBRIDEMENT, WOUND DEBRIDEMENT;  Surgeon: Alvin Junior MD;  Location: [facility] OR;  Service: General;  Laterality: Right;       Review of patient's allergies indicates:  No Known Allergies  Current Facility-Administered Medications   Medication Frequency    0.9%  NaCl infusion PRN    0.9%  NaCl infusion Once    0.9%  NaCl infusion PRN    0.9%  NaCl infusion Once    acetaminophen tablet 650 mg Q6H PRN    albuterol-ipratropium 2.5 mg-0.5 mg/3 mL nebulizer solution 3 mL Q4H PRN    cefTRIAXone (ROCEPHIN) 2 g in dextrose 5 % in water (D5W) 100 mL IVPB (MB+) Q24H    chlorhexidine 0.12 % solution 15 mL BID    dextrose 10% bolus 125 mL 125 mL PRN    famotidine (PF) injection 20 mg Daily    fentanyl IV bolus from bag/infusion 50 mcg Q1H PRN    glucagon (human recombinant) injection 1 mg PRN    heparin (porcine) injection 7,500 Units Q8H    hydrALAZINE injection 10 mg Q4H PRN    insulin aspart U-100 pen 0-10 Units Q4H PRN    insulin detemir U-100 (Levemir) pen 12 Units QHS    labetalol 20 mg/4 mL (5 mg/mL) IV syring Q6H PRN    melatonin tablet 6 mg Nightly PRN    metronidazole IVPB 500 mg Q8H    mupirocin 2 % ointment BID    naloxone 0.4 mg/mL injection 0.02 mg PRN    ondansetron injection 4 mg Q6H PRN    oxyCODONE immediate release tablet 5 mg Q6H PRN    polyethylene glycol packet 17 g Daily    potassium chloride 10 mEq in 100 mL IVPB PRN    And     potassium chloride 10 mEq in 100 mL IVPB PRN    And    potassium chloride 10 mEq in 100 mL IVPB PRN    prochlorperazine injection Soln 5 mg Q6H PRN    sodium chloride 0.9% bolus 250 mL 250 mL PRN    sodium chloride 0.9% bolus 250 mL 250 mL PRN    sodium chloride 0.9% flush 10 mL PRN     Family History    None       Tobacco Use    Smoking status: Not on file    Smokeless tobacco: Not on file   Substance and Sexual Activity    Alcohol use: Not on file    Drug use: Not on file    Sexual activity: Not on file     Review of Systems   Unable to perform ROS: Intubated   Objective:     Vital Signs (Most Recent):  Temp: 99.8 °F (37.7 °C) (02/02/24 0701)  Pulse: 104 (02/02/24 1129)  Resp: (!) 24 (02/02/24 1129)  BP: (!) 142/65 (02/02/24 0701)  SpO2: 99 % (02/02/24 1129) Vital Signs (24h Range):  Temp:  [98.2 °F (36.8 °C)-99.9 °F (37.7 °C)] 99.8 °F (37.7 °C)  Pulse:  [] 104  Resp:  [8-29] 24  SpO2:  [92 %-100 %] 99 %  BP: (142-186)/(65-85) 142/65  Arterial Line BP: (128-200)/(56-78) 140/65     Weight: (!) 164.5 kg (362 lb 10.5 oz) (02/02/24 0300)  Body mass index is 58.53 kg/m².  Body surface area is 2.77 meters squared.    I/O last 3 completed shifts:  In: 967.2 [I.V.:617.8; IV Piggyback:349.4]  Out: 3715 [Urine:65; Drains:150; Other:3500]     Physical Exam  Vitals and nursing note reviewed.   Constitutional:       Appearance: She is well-developed. She is ill-appearing.      Interventions: She is sedated and intubated.   HENT:      Head: Normocephalic and atraumatic.      Right Ear: External ear normal.      Left Ear: External ear normal.   Eyes:      General: No scleral icterus.        Right eye: No discharge.         Left eye: No discharge.      Conjunctiva/sclera: Conjunctivae normal.   Cardiovascular:      Rate and Rhythm: Normal rate and regular rhythm.      Pulses: Normal pulses.   Pulmonary:      Effort: She is intubated.      Comments: Intubated   Abdominal:      General: Bowel sounds are normal.    Musculoskeletal:         General: No tenderness.      Right lower leg: No edema.      Left lower leg: No edema.   Skin:     General: Skin is warm and dry.      Comments: Surgical wound covered with gauze dressing.   Neurological:      Mental Status: She is lethargic.      Significant Labs:  CBC:   Recent Labs   Lab 02/02/24  0335   WBC 22.87*   RBC 3.31*   HGB 8.8*   HCT 25.4*      MCV 77*   MCH 26.6*   MCHC 34.6     CMP:   Recent Labs   Lab 02/02/24  0335   *   CALCIUM 7.9*   ALBUMIN 1.4*   PROT 6.0      K 3.5   CO2 20*      BUN 26*   CREATININE 3.7*   ALKPHOS 130   ALT 18   AST 37   BILITOT 0.4     All labs within the past 24 hours have been reviewed.

## 2024-02-02 NOTE — CARE UPDATE
I have reviewed the chart of Sarah Saravia and participated in the care of the patient who is hospitalized for the following:    Active Hospital Problems    Diagnosis    *Ayaz's gangrene    Encephalopathy, metabolic    Acute hypoxemic respiratory failure    On mechanically assisted ventilation    Acute blood loss anemia    Acute renal failure with tubular necrosis    Metabolic acidosis    ALVARADO (acute kidney injury)    Hyponatremia    Diabetic acidosis without coma    Morbid obesity    Severe sepsis          I have reviewed Sarah Saravia with the multidisciplinary team during rounds.      Bree Lewis, POONAM  [facility]

## 2024-02-02 NOTE — ASSESSMENT & PLAN NOTE
ARF likely from Severe Sepsis and ATN    - Nephrology consulted   - MAP >65  - Avoid nephrotoxic agents  - Renally dose medications

## 2024-02-02 NOTE — SUBJECTIVE & OBJECTIVE
History reviewed. No pertinent past medical history.    Past Surgical History:   Procedure Laterality Date    INCISION AND DRAINAGE OF PERIRECTAL REGION N/A 1/29/2024    Procedure: INCISION AND DRAINAGE, PERIRECTAL REGION;  Surgeon: Axel Ramsay MD;  Location: [facility] OR;  Service: General;  Laterality: N/A;    PLACEMENT, TRIALYSIS CATH Right 1/31/2024    Procedure: INSERTION, CATHETER, TRIPLE LUMEN, HEMODIALYSIS, TEMPORARY;  Surgeon: Alvin Junior MD;  Location: [facility] OR;  Service: General;  Laterality: Right;    WOUND EXPLORATION Right 1/31/2024    Procedure: IRRIGATION & DEBRIDEMENT, WOUND DEBRIDEMENT;  Surgeon: Alvin Junior MD;  Location: [facility] OR;  Service: General;  Laterality: Right;       Review of patient's allergies indicates:  No Known Allergies    Medications:  No medications prior to admission.     Antibiotics (From admission, onward)      Start     Stop Route Frequency Ordered    02/02/24 1015  metronidazole IVPB 500 mg         -- IV Every 8 hours (non-standard times) 02/02/24 0911    02/02/24 0930  cefTRIAXone (ROCEPHIN) 2 g in dextrose 5 % in water (D5W) 100 mL IVPB (MB+)         -- IV Every 24 hours (non-standard times) 02/02/24 0825    01/30/24 0945  mupirocin 2 % ointment         02/04/24 0859 Nasl 2 times daily 01/30/24 0831          Antifungals (From admission, onward)      None          Antivirals (From admission, onward)      None             Immunization History   Administered Date(s) Administered    COVID-19, MRNA, LN-S, PF (Pfizer) (Purple Cap) 05/24/2021, 06/14/2021    PPD Test 08/16/2022       Family History    None       Social History     Socioeconomic History    Marital status: Single     Social Determinants of Health     Financial Resource Strain: Patient Unable To Answer (1/31/2024)    Overall Financial Resource Strain (CARDIA)     Difficulty of Paying Living Expenses: Patient unable to answer   Food Insecurity: Patient Declined (1/31/2024)    Hunger Vital Sign     Worried  About Running Out of Food in the Last Year: Patient declined     Ran Out of Food in the Last Year: Patient declined   Transportation Needs: Patient Unable To Answer (1/31/2024)    PRAPARE - Transportation     Lack of Transportation (Medical): Patient unable to answer     Lack of Transportation (Non-Medical): Patient unable to answer   Stress: Patient Unable To Answer (1/31/2024)    [facility] - Occupational Stress Questionnaire     Feeling of Stress : Patient unable to answer   Housing Stability: Patient Unable To Answer (1/31/2024)    Housing Stability Vital Sign     Unable to Pay for Housing in the Last Year: Patient unable to answer     Unstable Housing in the Last Year: Patient unable to answer     Review of Systems   Unable to perform ROS: Intubated     Objective:     Vital Signs (Most Recent):  Temp: 99.8 °F (37.7 °C) (02/02/24 0701)  Pulse: 105 (02/02/24 0739)  Resp: (!) 25 (02/02/24 0739)  BP: (!) 142/65 (02/02/24 0701)  SpO2: 100 % (02/02/24 0739) Vital Signs (24h Range):  Temp:  [98.2 °F (36.8 °C)-99.9 °F (37.7 °C)] 99.8 °F (37.7 °C)  Pulse:  [] 105  Resp:  [8-31] 25  SpO2:  [92 %-100 %] 100 %  BP: (142-186)/(65-85) 142/65  Arterial Line BP: (128-200)/(56-74) 140/65     Weight: (!) 164.5 kg (362 lb 10.5 oz)  Body mass index is 58.53 kg/m².    Estimated Creatinine Clearance: 28.1 mL/min (A) (based on SCr of 3.7 mg/dL (H)).     Physical Exam  Vitals and nursing note reviewed.   Constitutional:       Appearance: She is well-developed. She is ill-appearing.      Interventions: She is sedated and intubated.   HENT:      Head: Normocephalic and atraumatic.      Right Ear: External ear normal.      Left Ear: External ear normal.   Eyes:      General: No scleral icterus.        Right eye: No discharge.         Left eye: No discharge.      Conjunctiva/sclera: Conjunctivae normal.   Cardiovascular:      Rate and Rhythm: Normal rate and regular rhythm.      Heart sounds: Normal  heart sounds. No murmur heard.     No friction rub. No gallop.   Pulmonary:      Effort: Pulmonary effort is normal. No respiratory distress. She is intubated.      Breath sounds: No stridor. Rhonchi present. No wheezing or rales.   Abdominal:      General: Bowel sounds are normal.   Musculoskeletal:         General: No tenderness.   Skin:     General: Skin is warm and dry.      Comments: Surgical wound covered with gauze dressing.   Neurological:      Mental Status: She is lethargic.          Significant Labs: BMP:   Recent Labs   Lab 02/02/24 0335   *      K 3.5      CO2 20*   BUN 26*   CREATININE 3.7*   CALCIUM 7.9*   MG 1.7     CBC:   Recent Labs   Lab 02/01/24 0322 02/01/24 2242 02/02/24 0335   WBC 18.15* 23.55* 22.87*   HGB 9.0* 9.1* 8.8*   HCT 26.1* 26.3* 25.4*    215 216     Microbiology Results (last 7 days)       Procedure Component Value Units Date/Time    Aerobic culture [[id number]]  (Abnormal)  (Susceptibility) Collected: 01/30/24 0228    Order Status: Completed Specimen: Wound from Groin Updated: 02/02/24 0746     Aerobic Bacterial Culture PROTEUS MIRABILIS  Moderate      Narrative:      Right groin tissue    Aerobic culture [[id number]]  (Abnormal)  (Susceptibility) Collected: 01/30/24 0219    Order Status: Completed Specimen: Abscess from Groin Updated: 02/02/24 0746     Aerobic Bacterial Culture PROTEUS MIRABILIS  Moderate      Narrative:      Right groin abcess    Blood culture x two cultures. Draw prior to antibiotics. [[id number]] Collected: 01/29/24 2118    Order Status: Completed Specimen: Blood from Peripheral, Antecubital, Left Updated: 02/01/24 2303     Blood Culture, Routine No Growth to date      No Growth to date      No Growth to date      No Growth to date    Narrative:      Aerobic and anaerobic    Blood culture x two cultures. Draw prior to antibiotics. [[id number]] Collected: 01/29/24 1929    Order Status: Completed Specimen: Blood from Peripheral,  Antecubital, Right Updated: 02/01/24 2103     Blood Culture, Routine No Growth to date      No Growth to date      No Growth to date      No Growth to date    Narrative:      Aerobic and anaerobic    Culture, Anaerobe [[id number]] Collected: 01/30/24 0228    Order Status: Completed Specimen: Wound from Groin Updated: 02/01/24 0755     Anaerobic Culture Culture in progress    Narrative:      Right groin tissue    Culture, Anaerobe [[id number]] Collected: 01/30/24 0219    Order Status: Completed Specimen: Abscess from Groin Updated: 02/01/24 0754     Anaerobic Culture Culture in progress    Narrative:      Right groin abcess    AFB Culture & Smear [[id number]] Collected: 01/30/24 0219    Order Status: Completed Specimen: Abscess from Groin Updated: 01/31/24 2127     AFB Culture & Smear Culture in progress     AFB CULTURE STAIN No acid fast bacilli seen.    Narrative:      Right groin abcess    AFB Culture & Smear [[id number]] Collected: 01/30/24 0228    Order Status: Completed Specimen: Wound from Groin Updated: 01/31/24 2127     AFB Culture & Smear Culture in progress     AFB CULTURE STAIN No acid fast bacilli seen.    Narrative:      Right groin tissue    Gram stain [[id number]] Collected: 01/30/24 0228    Order Status: Completed Specimen: Wound from Groin Updated: 01/30/24 1035     Gram Stain Result Moderate Gram positive cocci in pairs and chains      No WBC's    Narrative:      Right groin tissue    Gram stain [[id number]] Collected: 01/30/24 0219    Order Status: Completed Specimen: Abscess from Groin Updated: 01/30/24 1035     Gram Stain Result Moderate Gram positive cocci in pairs and chains      No WBC's    Narrative:      Right groin abcess    Fungus culture [[id number]] Collected: 01/30/24 0219    Order Status: Sent Specimen: Abscess from Groin Updated: 01/30/24 0247    Fungus culture [[id number]] Collected: 01/30/24 0228    Order Status: Sent Specimen: Wound from Groin Updated: 01/30/24 0243    AFB Culture  & Smear [[id number]] Collected: 01/30/24 0058    Order Status: Sent Specimen: Abscess from Groin Updated: 01/30/24 0058    AFB Culture & Smear [[id number]] Collected: 01/29/24 1115    Order Status: Sent Specimen: Abscess from Groin Updated: 01/30/24 0019    AFB Culture & Smear [[id number]] Collected: 01/29/24 1115    Order Status: Sent Specimen: Abscess from Groin Updated: 01/30/24 0019    Fungus culture [[id number]] Collected: 01/29/24 1115    Order Status: Canceled Specimen: Abscess from Groin     Gram stain [[id number]] Collected: 01/29/24 1115    Order Status: Canceled Specimen: Abscess from Groin     Culture, Anaerobe [[id number]] Collected: 01/29/24 1115    Order Status: Canceled Specimen: Abscess from Groin     Aerobic culture [[id number]] Collected: 01/29/24 1115    Order Status: Canceled Specimen: Abscess from Groin     Culture, Anaerobe [[id number]] Collected: 01/29/24 1115    Order Status: Canceled Specimen: Abscess from Groin     Aerobic culture [[id number]] Collected: 01/29/24 1115    Order Status: Canceled Specimen: Abscess from Groin     Gram stain [[id number]] Collected: 01/29/24 1115    Order Status: Canceled Specimen: Abscess from Groin     Fungus culture [[id number]] Collected: 01/29/24 1115    Order Status: Canceled Specimen: Abscess from Groin             Significant Imaging: I have reviewed all pertinent imaging results/findings within the past 24 hours.

## 2024-02-02 NOTE — SUBJECTIVE & OBJECTIVE
Interval HPI:   Overnight events: Remains intubated in SICU. OR today for debridement with possible closure and wound vac placement. BG above goal ranges on current SQ insulin regimen. ALVARADO noted. Creatinine 3.7.     Eating:   NPO  Nausea: No  Hypoglycemia and intervention: No  Fever: No  TPN and/or TF: No  If yes, type of TF/TPN and rate: n/a    PMH, PSH, FH, SH reviewed     ROS:  Unable to obtain due to: Intubated    Review of Systems    Current Medications and/or Treatments Impacting Glycemic Control  Immunotherapy:    Immunosuppressants       None          Steroids:   Hormones (From admission, onward)      Start     Stop Route Frequency Ordered    01/30/24 0114  melatonin tablet 6 mg         -- Oral Nightly PRN 01/30/24 0016          Pressors:    Autonomic Drugs (From admission, onward)      None          Hyperglycemia/Diabetes Medications:   Antihyperglycemics (From admission, onward)      Start     Stop Route Frequency Ordered    02/02/24 1039  insulin aspart U-100 pen 0-10 Units         -- SubQ Every 4 hours PRN 02/02/24 0941    02/01/24 2100  insulin detemir U-100 (Levemir) pen 12 Units         -- SubQ Nightly 02/01/24 1227             PHYSICAL EXAMINATION:  Vitals:    02/02/24 0739   BP:    Pulse: 105   Resp: (!) 25   Temp:      Body mass index is 58.53 kg/m².     Physical Exam  Constitutional: Well developed, well nourished, obese, NAD.  ENT: External ears no masses with nose patent; normal hearing.  Neck: Supple; trachea midline.  Cardiovascular: Normal rate and regular rhythm.  Lungs: Normal effort; lungs anterior bilaterally clear to auscultation. Intubated on ventilator.   Abdomen: Soft, no masses, no hernias.  MS: No clubbing or cyanosis of nails noted;  unable to assess gait.  Skin: Surgical wound covered with gauze dressing.   Psychiatric: PABLO   Neurological: PABLO  Foot: Nails in good condition, no amputations noted.

## 2024-02-02 NOTE — ASSESSMENT & PLAN NOTE
I have reviewed hospital notes from  SICU service and other specialty providers. I have also reviewed CBC, CMP/BMP,  cultures and imaging with my interpretation as documented.     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 Operative cultures, not yet finalized, showing P mirabilis. She is afebrile, and with leukocytosis.   Agree with de-escalating to ceftriaxone 2 gm IV daily.  Will start metronidazole for anaerobic coverage of intraabdominal and  organisms.   Will follow up culture results.  Surgical debridements as indicated for non viable tissue as per Surgical Service.  Discussed with the patients daughter and boyfriend at bedside.  Discussed management plan with the staff and/or members from SICU and General Surgery service.

## 2024-02-02 NOTE — ASSESSMENT & PLAN NOTE
On mechanical ventilation. Decreased oxygen requirements from yesterday 2/1.   Management per primary.

## 2024-02-02 NOTE — SUBJECTIVE & OBJECTIVE
Interval History: Transfer from [facility] overnight. Intubated. Sedation paused. Following commands. HDS.     Medications:  Continuous Infusions:  Scheduled Meds:   sodium chloride 0.9%   Intravenous Once    sodium chloride 0.9%   Intravenous Once    cefTRIAXone (Rocephin) IV (PEDS and ADULTS)  2 g Intravenous Q24H    chlorhexidine  15 mL Mouth/Throat BID    famotidine (PF)  20 mg Intravenous Daily    heparin (porcine)  7,500 Units Subcutaneous Q8H    insulin detemir U-100  12 Units Subcutaneous QHS    mupirocin   Nasal BID    polyethylene glycol  17 g Per NG tube Daily     PRN Meds:sodium chloride 0.9%, sodium chloride 0.9%, acetaminophen, albuterol-ipratropium, dextrose 10%, fentanyl, glucagon (human recombinant), hydrALAZINE, insulin aspart U-100, labetalol, melatonin, naloxone, ondansetron, oxyCODONE, potassium chloride **AND** potassium chloride **AND** potassium chloride, prochlorperazine, sodium chloride 0.9%, sodium chloride 0.9%, sodium chloride 0.9%     Review of patient's allergies indicates:  No Known Allergies  Objective:     Vital Signs (Most Recent):  Temp: 99.8 °F (37.7 °C) (02/02/24 0701)  Pulse: 105 (02/02/24 0739)  Resp: (!) 25 (02/02/24 0739)  BP: (!) 142/65 (02/02/24 0701)  SpO2: 100 % (02/02/24 0739) Vital Signs (24h Range):  Temp:  [98.2 °F (36.8 °C)-99.9 °F (37.7 °C)] 99.8 °F (37.7 °C)  Pulse:  [] 105  Resp:  [8-31] 25  SpO2:  [92 %-100 %] 100 %  BP: (142-186)/(65-85) 142/65  Arterial Line BP: (128-200)/(56-74) 140/65     Weight: (!) 164.5 kg (362 lb 10.5 oz)  Body mass index is 58.53 kg/m².    Intake/Output - Last 3 Shifts         01/31 0700 02/01 0659 02/01 0700 02/02 0659 02/02 0700 02/03 0659    P.O.       I.V. (mL/kg) 1401.2 (9.3) 460.4 (2.8)     Other 0 0     IV Piggyback 898.7 349.4 33.3    Total Intake(mL/kg) 2299.9 (15.3) 809.7 (4.9) 33.3 (0.2)    Urine (mL/kg/hr) 140 (0) 50 (0) 0 (0)    Drains  150 50    Other 1500 2000     Blood 100      Total Output 1740 2200 50    Net  +559.9 -1390.3 -16.7                    Physical Exam  Vitals reviewed.   Constitutional:       General: She is not in acute distress.     Appearance: She is obese. She is ill-appearing.   HENT:      Head: Normocephalic.   Neck:      Comments: Right IJ trialysis   Cardiovascular:      Rate and Rhythm: Regular rhythm. Tachycardia present.      Pulses: Normal pulses.   Pulmonary:      Effort: Pulmonary effort is normal.      Comments: Mechanically ventilated   Abdominal:      Palpations: Abdomen is soft.      Tenderness: There is no abdominal tenderness.   Genitourinary:     Comments: Glynn in place with clear yellow urine   Musculoskeletal:      Comments: Dressings C/D/I. Surrounding skin soft edematous, no crepitus    Skin:     General: Skin is warm and dry.   Neurological:      General: No focal deficit present.          Significant Labs:  I have reviewed all pertinent lab results within the past 24 hours.  CBC:   Recent Labs   Lab 02/02/24  0335   WBC 22.87*   RBC 3.31*   HGB 8.8*   HCT 25.4*      MCV 77*   MCH 26.6*   MCHC 34.6     CMP:   Recent Labs   Lab 02/02/24  0335   *   CALCIUM 7.9*   ALBUMIN 1.4*   PROT 6.0      K 3.5   CO2 20*      BUN 26*   CREATININE 3.7*   ALKPHOS 130   ALT 18   AST 37   BILITOT 0.4       Significant Diagnostics:  I have reviewed all pertinent imaging results/findings within the past 24 hours.

## 2024-02-02 NOTE — ASSESSMENT & PLAN NOTE
BG goal 140-180    No previous hx of T2DM per family at bedside. A1c of 10.4. DKA at OSH.     Plan:  -Increase Levemir to 16 units q HS (0.2 u/kg dosing)   -Continue Moderate Dose Correction Scale  -BG monitoring q 4 hrs while NPO     ** Please call Endocrine for any BG related issues **      Discharge plans: TBD    Lab Results   Component Value Date    HGBA1C 10.4 (H) 01/30/2024

## 2024-02-02 NOTE — PROGRESS NOTES
Pharmacist Renal Dose Adjustment Note    Sarah Saravia is a 53 y.o. female being treated with the medication meropenem    Patient Data:    Vital Signs (Most Recent):  Temp: 99.8 °F (37.7 °C) (02/01/24 2237)  Pulse: 103 (02/01/24 2242)  Resp: (!) 26 (02/01/24 2242)  BP: (!) 161/74 (02/01/24 2100)  SpO2: 100 % (02/01/24 2242) Vital Signs (72h Range):  Temp:  [97.5 °F (36.4 °C)-103.1 °F (39.5 °C)]   Pulse:  []   Resp:  [8-39]   BP: ()/()   SpO2:  [90 %-100 %]   Arterial Line BP: (138-211)/(51-81)      Recent Labs   Lab 02/01/24  0031 02/01/24  0855 02/01/24 1933   CREATININE 4.0* 4.9* 3.2*     Serum creatinine: 3.2 mg/dL (H) 02/01/24 1933  Estimated creatinine clearance: 30.7 mL/min (A)    Medication: meropenem 1 gram every 24 hours will be changed to meropenem 1 gram every 12 hours    Stephan GardnerD

## 2024-02-02 NOTE — SUBJECTIVE & OBJECTIVE
Interval History/Significant Events: Arrived overnight intubated and HDS. Sedation turned off. Slowing waking up. GCS 4T. Plan for OR with ACS for possible debridement.     Follow-up For: Procedure(s) (LRB):  IRRIGATION & DEBRIDEMENT, WOUND DEBRIDEMENT (Right)  INSERTION, CATHETER, TRIPLE LUMEN, HEMODIALYSIS, TEMPORARY (Right)    Post-Operative Day: 2 Days Post-Op    Objective:     Vital Signs (Most Recent):  Temp: 98.2 °F (36.8 °C) (02/02/24 0305)  Pulse: 104 (02/02/24 0500)  Resp: (!) 27 (02/02/24 0500)  BP: (!) 161/74 (02/01/24 2100)  SpO2: 99 % (02/02/24 0500) Vital Signs (24h Range):  Temp:  [98.2 °F (36.8 °C)-100 °F (37.8 °C)] 98.2 °F (36.8 °C)  Pulse:  [] 104  Resp:  [8-32] 27  SpO2:  [92 %-100 %] 99 %  BP: (155-186)/(69-85) 161/74  Arterial Line BP: (128-200)/(55-74) 142/63     Weight: (!) 164.5 kg (362 lb 10.5 oz)  Body mass index is 58.53 kg/m².      Intake/Output Summary (Last 24 hours) at 2/2/2024 0619  Last data filed at 2/2/2024 0305  Gross per 24 hour   Intake 809.73 ml   Output 2050 ml   Net -1240.27 ml          Physical Exam  Vitals reviewed.   Constitutional:       Appearance: She is obese.   HENT:      Head: Atraumatic.   Cardiovascular:      Rate and Rhythm: Normal rate, rhythm.       Pulses: Normal pulses.   Pulmonary:      Comments: Intubated   Abdominal:      General: Abdomen is flat. Bowel sounds are normal.      Palpations: Abdomen is soft.   Musculoskeletal:         General: Normal range of motion.   Skin:     Capillary Refill: Capillary refill takes less than 2 seconds.      Comments: Rt groin wound that is packed   Neurological:      General: No focal deficit present.   Psychiatric:         Mood and Affect: Mood normal.        Vents:  Vent Mode: A/C (02/02/24 0318)  Set Rate: 18 BPM (02/02/24 0318)  Vt Set: 420 mL (02/02/24 0318)  PEEP/CPAP: 5 cmH20 (02/02/24 0318)  Oxygen Concentration (%): 40 (02/02/24 0500)  Peak Airway Pressure: 23 cmH20 (02/02/24 0318)  Plateau Pressure: 0  cmH20 (02/02/24 0318)  Total Ve: 10.1 L/m (02/02/24 0318)  Negative Inspiratory Force (cm H2O): 0 (02/02/24 0318)  F/VT Ratio<105 (RSBI): (!) 53.74 (02/02/24 0318)    Lines/Drains/Airways       Central Venous Catheter Line  Duration             Trialysis (Dialysis) Catheter 01/31/24 1051 right internal jugular 1 day              Drain  Duration                  Urethral Catheter 01/29/24 2315 3 days              Airway  Duration                  Airway - Non-Surgical 01/31/24 1020 1 day              Arterial Line  Duration             Arterial Line 01/31/24 1025 Right Radial 1 day              Peripheral Intravenous Line  Duration                  Peripheral IV - Single Lumen 01/29/24 2116 Left Antecubital 3 days                    Significant Labs:    CBC/Anemia Profile:  Recent Labs   Lab 02/01/24 0322 02/01/24 2242 02/02/24 0335   WBC 18.15* 23.55* 22.87*   HGB 9.0* 9.1* 8.8*   HCT 26.1* 26.3* 25.4*    215 216   MCV 76* 77* 77*   RDW 14.0 14.4 14.6*        Chemistries:  Recent Labs   Lab 02/01/24 0322 02/01/24  0855 02/01/24 1933 02/01/24 2242 02/02/24 0335   NA  --    < > 138 136 136   K  --    < > 3.4* 3.5 3.5   CL  --    < > 98 98 100   CO2  --    < > 27 23 20*   BUN  --    < > 22* 25* 26*   CREATININE  --    < > 3.2* 3.5* 3.7*   CALCIUM  --    < > 8.2* 8.5* 7.9*   ALBUMIN  --   --   --  1.6* 1.4*   PROT  --   --   --  6.8 6.0   BILITOT  --   --   --  0.5 0.4   ALKPHOS  --   --   --  147* 130   ALT  --   --   --  22 18   AST  --   --   --  35 37   MG  --   --   --  1.8 1.7   PHOS 4.1  --   --  3.5 3.4    < > = values in this interval not displayed.       None    Significant Imaging:  I have reviewed all pertinent imaging results/findings within the past 24 hours.

## 2024-02-02 NOTE — PROGRESS NOTES
VANCOMYCIN DOSING BY PHARMACY DISCONTINUATION NOTE    Sarah Saravia is a 53 y.o. female who had been consulted for vancomycin dosing.    The pharmacy consult for vancomycin dosing has been discontinued.     Vancomycin Dosing by Pharmacy Consult will sign-off. Please reconsult if necessary. Thank you for allowing us to participate in this patient's care.       Kathe Ingram, StephanD

## 2024-02-02 NOTE — ASSESSMENT & PLAN NOTE
Transfer from [facility]. Admitted for fourniers gangrene. Initiated HD on 1/31. ALVARADO 2/2 ATN in setting of septic shock. Arrived with CR 3.8. Unknown baseline. Last HD 2/1/24. Plan to OR today. Net -1.3L.OR today for debridement with possible closure and wound vac placement       ALVARADO most likley ATN in setting of septic shock     -No emergent indication fro RRT at this time. Electrolytes stable. Net -1.3L/24 hours  -Will eval RRT needs daily   -Daily RFP   -Strict I&Os  -Avoid nephrotoxins, if possible

## 2024-02-02 NOTE — CONSULTS
[facility]  Infectious Disease  Consult Note    Patient Name: Sarah Saravia  MRN: [id number]  Admission Date: 1/29/2024  Hospital Length of Stay: 4 days  Attending Physician: Steve Cole MD  Primary Care Provider: [facility]     Isolation Status: No active isolations    Patient information was obtained from relative(s), past medical records, and ER records.      Inpatient consult to Infectious Diseases  Consult performed by: Devika Falcon MD  Consult ordered by: David Stokes MD        Assessment/Plan:     Pulmonary  Acute hypoxemic respiratory failure  On mechanical ventilation. Decreased oxygen requirements from yesterday 2/1.   Management per primary.    Renal/  * Ayaz's gangrene  I have reviewed hospital notes from  SICU service and other specialty providers. I have also reviewed CBC, CMP/BMP,  cultures and imaging with my interpretation as documented.     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 Operative cultures, not yet finalized, showing P mirabilis. She is afebrile, and with leukocytosis.   Agree with de-escalating to ceftriaxone 2 gm IV daily.  Will start metronidazole for anaerobic coverage of intraabdominal and  organisms.   Will follow up culture results.  Surgical debridements as indicated for non viable tissue as per Surgical Service.  Discussed with the patients daughter and boyfriend at bedside.  Discussed management plan with the staff and/or members from SICU and General Surgery service.       ALVARADO (acute kidney injury)  Currently requiring renal replacement therapy.  Management per nephrology.  Renally dose antibiotics if indicated.        Thank you for your consult. I will follow-up with patient. Please contact us if you have any additional questions.    Devika Andujar MD  Infectious Disease  [facility]    75 minutes of total time spent on the encounter, which includes face to face time and  non-face to face time preparing to see the patient (eg, review of tests), obtaining and/or reviewing separately obtained history, documenting clinical information in the electronic or other health record, independently interpreting results (not separately reported) and communicating results to the patient/family/caregiver, or care coordination (not separately reported).     Subjective:     Principal Problem: Ayaz's gangrene    HPI: A 53-year-old woman with morbid obesity as evidenced by a BMI of 50 3 (documented as 58 at [facility]) who originally presented to [facility] with a wound to her posterior thigh, vomiting, and diarrhea for 3-4 days prior to admission.  On evaluation in [facility] ED she was noted to be afebrile tachycardic, and hypertensive.  Laboratory workup at that time revealed leukocytosis, elevated creatinine, elevated lactic acid, and elevated CRP.  Additionally it showed hyperglycemia with anion gap acidosis consistent with diabetic ketoacidosis.  CT imaging of the abdomen showed extensive soft tissue air throughout the right groin and inguinal region extending to the right lower quadrant of the anterior abdominal wall and medial aspect of the right thigh concerning for gas-forming infection.  She was admitted to hospital medicine service and taken to the OR by General surgery for debridement.  Empiric antibiotics with Zosyn, clindamycin, and vancomycin was started.  She underwent incision and drainage with debridement of the soft tissues down to the muscular fascia on 01/29.  She was then subsequently taken back to the OR on 01/31 where she underwent irrigation and debridement of the wound and insertion of a triple-lumen temporary hemodialysis catheter.  She was subsequently transitioned to meropenem and clindamycin while in [facility].  Unfortunately her hospital course was complicated by acute respiratory failure requiring intubation with mechanical ventilation and worsening ALVARADO  prompting initiation of renal replacement therapy.  She was transferred to [facility] for higher level of care.  Upon transfer the patient's antibiotic therapy was deescalated to ceftriaxone.    Infectious disease is consulted for Antibiotic management: currently on merem, concern for nec fasciitis            History reviewed. No pertinent past medical history.    Past Surgical History:   Procedure Laterality Date    INCISION AND DRAINAGE OF PERIRECTAL REGION N/A 1/29/2024    Procedure: INCISION AND DRAINAGE, PERIRECTAL REGION;  Surgeon: Axel Ramsay MD;  Location: [facility] OR;  Service: General;  Laterality: N/A;    PLACEMENT, TRIALYSIS CATH Right 1/31/2024    Procedure: INSERTION, CATHETER, TRIPLE LUMEN, HEMODIALYSIS, TEMPORARY;  Surgeon: Alvin Junior MD;  Location: [facility] OR;  Service: General;  Laterality: Right;    WOUND EXPLORATION Right 1/31/2024    Procedure: IRRIGATION & DEBRIDEMENT, WOUND DEBRIDEMENT;  Surgeon: Alvin Junior MD;  Location: [facility] OR;  Service: General;  Laterality: Right;       Review of patient's allergies indicates:  No Known Allergies    Medications:  No medications prior to admission.     Antibiotics (From admission, onward)      Start     Stop Route Frequency Ordered    02/02/24 1015  metronidazole IVPB 500 mg         -- IV Every 8 hours (non-standard times) 02/02/24 0911    02/02/24 0930  cefTRIAXone (ROCEPHIN) 2 g in dextrose 5 % in water (D5W) 100 mL IVPB (MB+)         -- IV Every 24 hours (non-standard times) 02/02/24 0825    01/30/24 0945  mupirocin 2 % ointment         02/04/24 0859 Nasl 2 times daily 01/30/24 0831          Antifungals (From admission, onward)      None          Antivirals (From admission, onward)      None             Immunization History   Administered Date(s) Administered    COVID-19, MRNA, LN-S, PF (Pfizer) (Purple Cap) 05/24/2021, 06/14/2021    PPD Test 08/16/2022       Family History    None       Social History     Socioeconomic History    Marital status:  Single     Social Determinants of Health     Financial Resource Strain: Patient Unable To Answer (1/31/2024)    Overall Financial Resource Strain (CARDIA)     Difficulty of Paying Living Expenses: Patient unable to answer   Food Insecurity: Patient Declined (1/31/2024)    Hunger Vital Sign     Worried About Running Out of Food in the Last Year: Patient declined     Ran Out of Food in the Last Year: Patient declined   Transportation Needs: Patient Unable To Answer (1/31/2024)    PRAPARE - Transportation     Lack of Transportation (Medical): Patient unable to answer     Lack of Transportation (Non-Medical): Patient unable to answer   Stress: Patient Unable To Answer (1/31/2024)    [employer] - Occupational Stress Questionnaire     Feeling of Stress : Patient unable to answer   Housing Stability: Patient Unable To Answer (1/31/2024)    Housing Stability Vital Sign     Unable to Pay for Housing in the Last Year: Patient unable to answer     Unstable Housing in the Last Year: Patient unable to answer     Review of Systems   Unable to perform ROS: Intubated     Objective:     Vital Signs (Most Recent):  Temp: 99.8 °F (37.7 °C) (02/02/24 0701)  Pulse: 105 (02/02/24 0739)  Resp: (!) 25 (02/02/24 0739)  BP: (!) 142/65 (02/02/24 0701)  SpO2: 100 % (02/02/24 0739) Vital Signs (24h Range):  Temp:  [98.2 °F (36.8 °C)-99.9 °F (37.7 °C)] 99.8 °F (37.7 °C)  Pulse:  [] 105  Resp:  [8-31] 25  SpO2:  [92 %-100 %] 100 %  BP: (142-186)/(65-85) 142/65  Arterial Line BP: (128-200)/(56-74) 140/65     Weight: (!) 164.5 kg (362 lb 10.5 oz)  Body mass index is 58.53 kg/m².    Estimated Creatinine Clearance: 28.1 mL/min (A) (based on SCr of 3.7 mg/dL (H)).     Physical Exam  Vitals and nursing note reviewed.   Constitutional:       Appearance: She is well-developed. She is ill-appearing.      Interventions: She is sedated and intubated.   HENT:      Head: Normocephalic and atraumatic.      Right Ear:  External ear normal.      Left Ear: External ear normal.   Eyes:      General: No scleral icterus.        Right eye: No discharge.         Left eye: No discharge.      Conjunctiva/sclera: Conjunctivae normal.   Cardiovascular:      Rate and Rhythm: Normal rate and regular rhythm.      Heart sounds: Normal heart sounds. No murmur heard.     No friction rub. No gallop.   Pulmonary:      Effort: Pulmonary effort is normal. No respiratory distress. She is intubated.      Breath sounds: No stridor. Rhonchi present. No wheezing or rales.   Abdominal:      General: Bowel sounds are normal.   Musculoskeletal:         General: No tenderness.   Skin:     General: Skin is warm and dry.      Comments: Surgical wound covered with gauze dressing.   Neurological:      Mental Status: She is lethargic.          Significant Labs: BMP:   Recent Labs   Lab 02/02/24  0335   *      K 3.5      CO2 20*   BUN 26*   CREATININE 3.7*   CALCIUM 7.9*   MG 1.7     CBC:   Recent Labs   Lab 02/01/24  0322 02/01/24  2242 02/02/24  0335   WBC 18.15* 23.55* 22.87*   HGB 9.0* 9.1* 8.8*   HCT 26.1* 26.3* 25.4*    215 216     Microbiology Results (last 7 days)       Procedure Component Value Units Date/Time    Aerobic culture [[id number]]  (Abnormal)  (Susceptibility) Collected: 01/30/24 0228    Order Status: Completed Specimen: Wound from Groin Updated: 02/02/24 0746     Aerobic Bacterial Culture PROTEUS MIRABILIS  Moderate      Narrative:      Right groin tissue    Aerobic culture [[id number]]  (Abnormal)  (Susceptibility) Collected: 01/30/24 0219    Order Status: Completed Specimen: Abscess from Groin Updated: 02/02/24 0746     Aerobic Bacterial Culture PROTEUS MIRABILIS  Moderate      Narrative:      Right groin abcess    Blood culture x two cultures. Draw prior to antibiotics. [[id number]] Collected: 01/29/24 2118    Order Status: Completed Specimen: Blood from Peripheral, Antecubital, Left Updated: 02/01/24 2300     Blood  Culture, Routine No Growth to date      No Growth to date      No Growth to date      No Growth to date    Narrative:      Aerobic and anaerobic    Blood culture x two cultures. Draw prior to antibiotics. [[id number]] Collected: 01/29/24 1929    Order Status: Completed Specimen: Blood from Peripheral, Antecubital, Right Updated: 02/01/24 2103     Blood Culture, Routine No Growth to date      No Growth to date      No Growth to date      No Growth to date    Narrative:      Aerobic and anaerobic    Culture, Anaerobe [[id number]] Collected: 01/30/24 0228    Order Status: Completed Specimen: Wound from Groin Updated: 02/01/24 0755     Anaerobic Culture Culture in progress    Narrative:      Right groin tissue    Culture, Anaerobe [[id number]] Collected: 01/30/24 0219    Order Status: Completed Specimen: Abscess from Groin Updated: 02/01/24 0754     Anaerobic Culture Culture in progress    Narrative:      Right groin abcess    AFB Culture & Smear [[id number]] Collected: 01/30/24 0219    Order Status: Completed Specimen: Abscess from Groin Updated: 01/31/24 2127     AFB Culture & Smear Culture in progress     AFB CULTURE STAIN No acid fast bacilli seen.    Narrative:      Right groin abcess    AFB Culture & Smear [[id number]] Collected: 01/30/24 0228    Order Status: Completed Specimen: Wound from Groin Updated: 01/31/24 2127     AFB Culture & Smear Culture in progress     AFB CULTURE STAIN No acid fast bacilli seen.    Narrative:      Right groin tissue    Gram stain [[id number]] Collected: 01/30/24 0228    Order Status: Completed Specimen: Wound from Groin Updated: 01/30/24 1035     Gram Stain Result Moderate Gram positive cocci in pairs and chains      No WBC's    Narrative:      Right groin tissue    Gram stain [[id number]] Collected: 01/30/24 0219    Order Status: Completed Specimen: Abscess from Groin Updated: 01/30/24 1035     Gram Stain Result Moderate Gram positive cocci in pairs and chains      No WBC's     Narrative:      Right groin abcess    Fungus culture [[id number]] Collected: 01/30/24 0219    Order Status: Sent Specimen: Abscess from Groin Updated: 01/30/24 0247    Fungus culture [[id number]] Collected: 01/30/24 0228    Order Status: Sent Specimen: Wound from Groin Updated: 01/30/24 0243    AFB Culture & Smear [[id number]] Collected: 01/30/24 0058    Order Status: Sent Specimen: Abscess from Groin Updated: 01/30/24 0058    AFB Culture & Smear [[id number]] Collected: 01/29/24 1115    Order Status: Sent Specimen: Abscess from Groin Updated: 01/30/24 0019    AFB Culture & Smear [[id number]] Collected: 01/29/24 1115    Order Status: Sent Specimen: Abscess from Groin Updated: 01/30/24 0019    Fungus culture [[id number]] Collected: 01/29/24 1115    Order Status: Canceled Specimen: Abscess from Groin     Gram stain [[id number]] Collected: 01/29/24 1115    Order Status: Canceled Specimen: Abscess from Groin     Culture, Anaerobe [[id number]] Collected: 01/29/24 1115    Order Status: Canceled Specimen: Abscess from Groin     Aerobic culture [[id number]] Collected: 01/29/24 1115    Order Status: Canceled Specimen: Abscess from Groin     Culture, Anaerobe [[id number]] Collected: 01/29/24 1115    Order Status: Canceled Specimen: Abscess from Groin     Aerobic culture [[id number]] Collected: 01/29/24 1115    Order Status: Canceled Specimen: Abscess from Groin     Gram stain [[id number]] Collected: 01/29/24 1115    Order Status: Canceled Specimen: Abscess from Groin     Fungus culture [[id number]] Collected: 01/29/24 1115    Order Status: Canceled Specimen: Abscess from Groin             Significant Imaging: I have reviewed all pertinent imaging results/findings within the past 24 hours.

## 2024-02-02 NOTE — PROGRESS NOTES
[facility]  Critical Care - Surgery  Progress Note    Patient Name: Sarah Saravia  MRN: [id number]  Admission Date: 1/29/2024  Hospital Length of Stay: 4 days  Code Status: Full Code  Attending Provider: Steve Cole MD  [facility]   Principal Problem: Ayaz's gangrene    Subjective:     Hospital/ICU Course:  No notes on file    Interval History/Significant Events: Arrived overnight intubated and HDS. Sedation turned off. Slowing waking up. GCS 4T. Plan for OR with ACS for possible debridement.     Follow-up For: Procedure(s) (LRB):  IRRIGATION & DEBRIDEMENT, WOUND DEBRIDEMENT (Right)  INSERTION, CATHETER, TRIPLE LUMEN, HEMODIALYSIS, TEMPORARY (Right)    Post-Operative Day: 2 Days Post-Op    Objective:     Vital Signs (Most Recent):  Temp: 98.2 °F (36.8 °C) (02/02/24 0305)  Pulse: 104 (02/02/24 0500)  Resp: (!) 27 (02/02/24 0500)  BP: (!) 161/74 (02/01/24 2100)  SpO2: 99 % (02/02/24 0500) Vital Signs (24h Range):  Temp:  [98.2 °F (36.8 °C)-100 °F (37.8 °C)] 98.2 °F (36.8 °C)  Pulse:  [] 104  Resp:  [8-32] 27  SpO2:  [92 %-100 %] 99 %  BP: (155-186)/(69-85) 161/74  Arterial Line BP: (128-200)/(55-74) 142/63     Weight: (!) 164.5 kg (362 lb 10.5 oz)  Body mass index is 58.53 kg/m².      Intake/Output Summary (Last 24 hours) at 2/2/2024 0619  Last data filed at 2/2/2024 0305  Gross per 24 hour   Intake 809.73 ml   Output 2050 ml   Net -1240.27 ml          Physical Exam  Vitals reviewed.   Constitutional:       Appearance: She is obese.   HENT:      Head: Atraumatic.   Cardiovascular:      Rate and Rhythm: Normal rate, rhythm.       Pulses: Normal pulses.   Pulmonary:      Comments: Intubated   Abdominal:      General: Abdomen is flat. Bowel sounds are normal.      Palpations: Abdomen is soft.   Musculoskeletal:         General: Normal range of motion.   Skin:     Capillary Refill: Capillary refill takes less than 2 seconds.       Comments: Rt groin wound that is packed   Neurological:      General: No focal deficit present.   Psychiatric:         Mood and Affect: Mood normal.        Vents:  Vent Mode: A/C (02/02/24 0318)  Set Rate: 18 BPM (02/02/24 0318)  Vt Set: 420 mL (02/02/24 0318)  PEEP/CPAP: 5 cmH20 (02/02/24 0318)  Oxygen Concentration (%): 40 (02/02/24 0500)  Peak Airway Pressure: 23 cmH20 (02/02/24 0318)  Plateau Pressure: 0 cmH20 (02/02/24 0318)  Total Ve: 10.1 L/m (02/02/24 0318)  Negative Inspiratory Force (cm H2O): 0 (02/02/24 0318)  F/VT Ratio<105 (RSBI): (!) 53.74 (02/02/24 0318)    Lines/Drains/Airways       Central Venous Catheter Line  Duration             Trialysis (Dialysis) Catheter 01/31/24 1051 right internal jugular 1 day              Drain  Duration                  Urethral Catheter 01/29/24 2315 3 days              Airway  Duration                  Airway - Non-Surgical 01/31/24 1020 1 day              Arterial Line  Duration             Arterial Line 01/31/24 1025 Right Radial 1 day              Peripheral Intravenous Line  Duration                  Peripheral IV - Single Lumen 01/29/24 2116 Left Antecubital 3 days                    Significant Labs:    CBC/Anemia Profile:  Recent Labs   Lab 02/01/24 0322 02/01/24 2242 02/02/24 0335   WBC 18.15* 23.55* 22.87*   HGB 9.0* 9.1* 8.8*   HCT 26.1* 26.3* 25.4*    215 216   MCV 76* 77* 77*   RDW 14.0 14.4 14.6*        Chemistries:  Recent Labs   Lab 02/01/24  0322 02/01/24  0855 02/01/24  1933 02/01/24 2242 02/02/24  0335   NA  --    < > 138 136 136   K  --    < > 3.4* 3.5 3.5   CL  --    < > 98 98 100   CO2  --    < > 27 23 20*   BUN  --    < > 22* 25* 26*   CREATININE  --    < > 3.2* 3.5* 3.7*   CALCIUM  --    < > 8.2* 8.5* 7.9*   ALBUMIN  --   --   --  1.6* 1.4*   PROT  --   --   --  6.8 6.0   BILITOT  --   --   --  0.5 0.4   ALKPHOS  --   --   --  147* 130   ALT  --   --   --  22 18   AST  --   --   --  35 37   MG  --   --   --  1.8 1.7   PHOS 4.1  --   --   3.5 3.4    < > = values in this interval not displayed.       None    Significant Imaging:  I have reviewed all pertinent imaging results/findings within the past 24 hours.  Assessment/Plan:     Renal/  * Ayaz's gangrene    Neuro/Psych:   -- Sedation: None  -- Pain: Fent PRN             Cards:   -- HDS  -- goal SBP < 180, MAP >65      Pulm:   #Acute Respiratory Failure  -- Intubated  -- CXR - edematous   -- Goal O2 sat > 90%  -- Wean as able      Renal:  #ALVARADO on CKD  Received HD 2/1. ATN.   -- Mildly alkalotic. Normal lactic. Anuric.  -- Consult nephrology; appreciate recs  -- Keep meza for strict I/O  -- Urine output/24h: 25cc  Recent Labs   Lab 02/01/24 1933 02/01/24 2242 02/02/24  0335   BUN 22* 25* 26*   CREATININE 3.2* 3.5* 3.7*        FEN / GI:   -- Daily CMPs  -- NGT in place   -- Replace lytes as needed  -- Nutrition: NPO  -- GI PPX       ID:    #Ayaz's gangrene  s/p OR debridement for nec fascitis. R groin tissue with proteus  -- continue merepem d/c vanc  -- Consult ID; appreciate recs  Recent Labs   Lab 02/01/24 0322 02/01/24 2242 02/02/24  0335   WBC 18.15* 23.55* 22.87*        Heme/Onc:  -- Daily CBC  -- Heparin DVT ppx  Recent Labs   Lab 02/01/24 0322 02/01/24 2242 02/01/24 2246 02/02/24  0335   HGB 9.0* 9.1*  --  8.8*    215  --  216   APTT  --   --  27.8  --    INR  --   --  1.0  --         Endo:   #IDDM  Initially presented to OSH with DKA with latest A1C 10.4 AG Gap resolved. Started on 12u insulin.  - continue SQ regimen with 12U detimir and SSI  - Endocrine consulted for assistance. Appreciate help.         PPx:   Feeding: NPO  Analgesia/Sedation: See above  Thromboembolic prevention: SQH   HOB >30: Y  Stress Ulcer ppx: Famotidine  Glucose control: Critical care goal 140-180 g/dl, SSI**  Bowel reg: N/A  Invasive Lines/Drains/Airway: Meza, ETT, Art line, Trialysis, PIV x2      Dispo/Code Status/Palliative:   -- SICU / Full Code            Chirag Borges MD  Critical Care  - Surgery  [facility] - Surgical Intensive Care

## 2024-02-02 NOTE — ASSESSMENT & PLAN NOTE
2/2 Severe sepsis and sedative medications. Likely to resolve as source control has be achieved. Sedation remains off at this time. Neuro exam improving from previous. Sedation remains off at this time.

## 2024-02-02 NOTE — ASSESSMENT & PLAN NOTE
Neuro/Psych:   -- Sedation: propofol and fentanyl gtt  -- Pain: fentanyl gtt             Cards:   -- HDS  -- goal SBP < 180, MAP >65      Pulm:   #Acute Respiratory Failure  -- Intubated  -- Obtain baseline CXR and ABG  -- Goal O2 sat > 90%  -- Wean as able      Renal:  #ALVARADO on CKD  #Lactic Acidosis  Initial Cr 3.9 and uptrending. Received HD 2/1. Etiology unknown between pre-renal and intrarenal dysfunction.  -- Per renal recs from OSH, continue NaHCO3 in D5W 150cc/hr  -- Consult nephrology; appreciate recs  -- Keep meza for strict I/O  -- Urine output/24h:   Recent Labs   Lab 01/31/24  1558 02/01/24  0031 02/01/24  0855   BUN 58* 35* 40*   CREATININE 5.4* 4.0* 4.9*           FEN / GI:   -- Daily CMPs  -- Replace lytes as needed  -- Nutrition: NPO      ID:    #Ayaz's gangrene  CT abdomen and pelvis showed extensive soft tissue air throughout the right groin and inguinal region and extending to involve the right lower quadrant anterior abdominal wall with extensive soft tissue air also seen involving the proximal medial aspect of the right thigh, concerning for gas-forming infection.  s/p OR debridement for nec fascitis. R groin tissue with proteus  -- Per OSH, continue merem  -- F/u suscepitbilies from OSH  -- Consult ID; appreciate recs  Recent Labs   Lab 01/30/24  0447 01/31/24  0519 02/01/24  0322   WBC 14.73* 16.06* 18.15*           Heme/Onc:  -- Daily CBC  Recent Labs   Lab 01/30/24  0447 01/31/24  0519 02/01/24  0322   HGB 12.0 11.3* 9.0*    197 194         Endo:   #IDDM  Initially presented to OSH with DKA with latest A1C 10.4 AG Gap resolved. Started on 12u insulin.  - continue SQ regimen with 12U detimir and SSI        PPx:   Feeding: NPO  Analgesia/Sedation: fentanyl gtt / fentanyl gtt, propofol gtt  Thromboembolic prevention: SQH   HOB >30: Y  Stress Ulcer ppx: Famotidine  Glucose control: Critical care goal 140-180 g/dl, SSI**  Bowel reg: N/A  Invasive Lines/Drains/Airway: Meza, ETT, Art  line, Trialysis, PIV x2  Deescalation: merem        Dispo/Code Status/Palliative:   -- SICU / Full Code

## 2024-02-02 NOTE — ASSESSMENT & PLAN NOTE
53 y.o. 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     - OR today for debridement with possible closure and wound vac placement  - Will obtain informed consent   - Okay to change overlying dressings (ABD pads), keep kerlix packing in place   - Daily SBTs  - ID consult for antibiotic management   Wound cx growing proteus mirabilis. Sensitive to Rocephin  - Endocrine consult for DM management  - Nephrology consult for HD management  - Okay for DVT ppx   - Remainder of care per SICU   16-Sep-2017

## 2024-02-02 NOTE — RESPIRATORY THERAPY
Patient received from EMS to SICU [id number]. Patient intubated with #7.5 ETT/ 22 @ the [facility]. Placed on documented vent settings. Ambu bag @ bedside. Will continue to monitor.

## 2024-02-02 NOTE — NURSING
Nurses Note -- 4 Eyes      2/2/2024   2:24 AM      Skin assessed during: Transfer      [] No Altered Skin Integrity Present    []Prevention Measures Documented      [x] Yes- Altered Skin Integrity Present or Discovered   [] LDA Added if Not in Epic (Describe Wound)   [] New Altered Skin Integrity was Present on Admit and Documented in LDA   [x] Wound Image Taken    Wound Care Consulted? No    Attending Nurse:  ELISABETH Fry     Second RN/Staff Member:  ELISABETH Rodriguez

## 2024-02-02 NOTE — PLAN OF CARE
SICU Plan of Care:     Packing changed at bedside shortly after patient's arrival to SICU. Wound overall looked decent with small amounts of devitalized tissue. Replaced packing with several betadine kerlix rolls tied together.    Patient was taken off of fent + prop in an effort to obtain a baseline neuro exam earlier this evening; moves extremities spontaneously with minimal response to pain at this time. This is an improvement from earlier exams; expect neuro exam to improve as fent gtt wears off.       FLORENCIO Mccormack MD  General Surgery, PGY-2  [phone]

## 2024-02-02 NOTE — CONSULTS
[facility]  Endocrinology  Diabetes Consult Note    Consult Requested by: Steve Cole MD   Reason for admit: Ayaz's gangrene    HISTORY OF PRESENT ILLNESS:  Reason for Consult: Management of T2DM, Hyperglycemia     Surgical Procedure and Date: n/a    Diabetes diagnosis year: new onset     Home Diabetes Medications:  none per chart review and family present at bedside     Lab Results   Component Value Date    HGBA1C 10.4 (H) 01/30/2024       Diabetes Complications include:     Hyperglycemia, DKA at OSH     Complicating diabetes co morbidities:   Obesity       HPI:   Patient is a 53 y.o. female with a diagnosis of obesity (BMI 53) admitted with N/V and R groin wound found to be in DKA at OSH. 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 While at OSH pt developed acute respiratory failure requiring intubation. Pulmonology was consulted for ventilator management and critical illness. Nephrology was consulted for worsening alvarado in the setting of lactic acidosis and septic shock likely ATN, sCr elevated at 3.8 on admit now 4.0 post HD. Pt being admitted to SICU for surgical critical care management. Immediate plans include hemodynamic stabilization, weaning of respiratory support, and RRT.  Endocrinology consulted for management of T2DM.                 Interval HPI:   Overnight events: Remains intubated in SICU. OR today for debridement with possible closure and wound vac placement. BG above goal ranges on current SQ insulin regimen. ALVARADO noted. Creatinine 3.7.     Eating:   NPO  Nausea: No  Hypoglycemia and intervention: No  Fever: No  TPN and/or TF: No  If yes, type of TF/TPN and rate: n/a    PMH, PSH, FH, SH reviewed     ROS:  Unable to obtain due to: Intubated    Review of Systems    Current Medications and/or Treatments Impacting Glycemic Control  Immunotherapy:    Immunosuppressants        "None          Steroids:   Hormones (From admission, onward)      Start     Stop Route Frequency Ordered    01/30/24 0114  melatonin tablet 6 mg         -- Oral Nightly PRN 01/30/24 0016          Pressors:    Autonomic Drugs (From admission, onward)      None          Hyperglycemia/Diabetes Medications:   Antihyperglycemics (From admission, onward)      Start     Stop Route Frequency Ordered    02/02/24 1039  insulin aspart U-100 pen 0-10 Units         -- SubQ Every 4 hours PRN 02/02/24 0941    02/01/24 2100  insulin detemir U-100 (Levemir) pen 12 Units         -- SubQ Nightly 02/01/24 1227             PHYSICAL EXAMINATION:  Vitals:    02/02/24 0739   BP:    Pulse: 105   Resp: (!) 25   Temp:      Body mass index is 58.53 kg/m².     Physical Exam  Constitutional: Well developed, well nourished, obese, NAD.  ENT: External ears no masses with nose patent; normal hearing.  Neck: Supple; trachea midline.  Cardiovascular: Normal rate and regular rhythm.  Lungs: Normal effort; lungs anterior bilaterally clear to auscultation. Intubated on ventilator.   Abdomen: Soft, no masses, no hernias.  MS: No clubbing or cyanosis of nails noted;  unable to assess gait.  Skin: Surgical wound covered with gauze dressing.   Psychiatric: PABLO   Neurological: PABLO  Foot: Nails in good condition, no amputations noted.         Labs Reviewed and Include   Recent Labs   Lab 02/02/24  0335   *   CALCIUM 7.9*   ALBUMIN 1.4*   PROT 6.0      K 3.5   CO2 20*      BUN 26*   CREATININE 3.7*   ALKPHOS 130   ALT 18   AST 37   BILITOT 0.4     Lab Results   Component Value Date    WBC 22.87 (H) 02/02/2024    HGB 8.8 (L) 02/02/2024    HCT 25.4 (L) 02/02/2024    MCV 77 (L) 02/02/2024     02/02/2024     No results for input(s): "TSH", "FREET4" in the last 168 hours.  Lab Results   Component Value Date    HGBA1C 10.4 (H) 01/30/2024       Nutritional status:   Body mass index is 58.53 kg/m².  Lab Results   Component Value Date    " "ALBUMIN 1.4 (L) 02/02/2024    ALBUMIN 1.6 (L) 02/01/2024    ALBUMIN 2.6 (L) 01/29/2024     No results found for: "PREALBUMIN"    Estimated Creatinine Clearance: 28.1 mL/min (A) (based on SCr of 3.7 mg/dL (H)).    Accu-Checks  Recent Labs     01/31/24  2138 02/01/24  0806 02/01/24  1208 02/01/24  1808 02/01/24  2031 02/01/24  2241 02/02/24  0035 02/02/24  0618 02/02/24  0725 02/02/24  1229   POCTGLUCOSE 219* 265* 282* 155* 189* 172* 207* 237* 221* 229*        ASSESSMENT and PLAN    Renal/  * Ayaz's gangrene  Managed per primary team  Optimize BG control        ALVARADO (acute kidney injury)  Lab Results   Component Value Date    CREATININE 3.7 (H) 02/02/2024     Avoid insulin stacking  Titrate insulin slowly       Endocrine  Morbid obesity  Body mass index is 58.53 kg/m².  May increase insulin resistance.         New onset type 2 diabetes mellitus  BG goal 140-180    No previous hx of T2DM per family at bedside. A1c of 10.4. DKA at OSH.     Plan:  -Increase Levemir to 16 units q HS (0.2 u/kg dosing)   -Continue Moderate Dose Correction Scale  -BG monitoring q 4 hrs while NPO     ** Please call Endocrine for any BG related issues **      Discharge plans: TBD    Lab Results   Component Value Date    HGBA1C 10.4 (H) 01/30/2024             Plan discussed with family at bedside.     Zoie Muñiz, NP  Endocrinology  [facility]  "

## 2024-02-02 NOTE — HPI
Reason for Consult: Management of T2DM, Hyperglycemia     Surgical Procedure and Date: n/a    Diabetes diagnosis year: new onset     Home Diabetes Medications:  none per chart review and family present at bedside     Lab Results   Component Value Date    HGBA1C 10.4 (H) 01/30/2024       Diabetes Complications include:     Hyperglycemia, DKA at OSH     Complicating diabetes co morbidities:   Obesity       HPI:   Patient is a 53 y.o. female with a diagnosis of obesity (BMI 53) admitted with N/V and R groin wound found to be in DKA at OSH. 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 While at OSH pt developed acute respiratory failure requiring intubation. Pulmonology was consulted for ventilator management and critical illness. Nephrology was consulted for worsening vishal in the setting of lactic acidosis and septic shock likely ATN, sCr elevated at 3.8 on admit now 4.0 post HD. Pt being admitted to SICU for surgical critical care management. Immediate plans include hemodynamic stabilization, weaning of respiratory support, and RRT.  Endocrinology consulted for management of T2DM.

## 2024-02-02 NOTE — ANESTHESIA PREPROCEDURE EVALUATION
[facility]  Anesthesia Pre-Operative Evaluation         Patient Name/: Sarah Saravia, 1970  MRN: [id number]    SUBJECTIVE:     Pre-operative evaluation for Procedure(s) (LRB):  DEBRIDEMENT, WOUND (Bilateral)     2024    Sarah Saravia is a 53 y.o. female w/ a significant PMHx of DKA, ALVARADO, ros's gangrene who presented to the SICU intubated and sedated. Pt is presenting for debridement of ros's gangrene. Intubated due to acute hypoxic respiratory failure.     Patient now presents for the above procedure(s).    ________________________________________  No results found for this or any previous visit.    ________________________________________    Prev airway:     Performed by: Brett Sanchez CRNA  Authorized by: Florian Loaiza II, MD    Intubation:     Induction:  Intravenous    Intubated:  Postinduction    Mask Ventilation:  Not attempted    Attempts:  1    Attempted By:  CRNA    Method of Intubation:  Video laryngoscopy    Blade:  [place] 3    Laryngeal View Grade: Grade I - full view of cords      Difficult Airway Encountered?: No      Complications:  None    Airway Device:  Oral endotracheal tube    Airway Device Size:  7.5    Inflation Amount (mL):  6    Tube secured:  21    Secured at:  The lips    Placement Verified By:  Capnometry    Complicating Factors:  Obesity and short neck    Findings Post-Intubation:  BS equal bilateral and atraumatic/condition of teeth unchanged    LDA:   Trialysis (Dialysis) Catheter 24 1051 right internal jugular (Active)   Line Necessity Review CRRT/HD 24 030   Verification by X-ray Yes 24   Site Assessment No drainage;No redness;No swelling;No warmth 24 030   Line Securement Device Secured with sutures 24 030   Dressing Type CHG impregnated dressing/sponge;Central line dressing 24 0305   Dressing Status Clean;Dry;Intact 24 030   Dressing  Intervention Integrity maintained 02/02/24 0305   Date on Dressing 02/01/24 02/02/24 0305   Dressing Due to be Changed 02/08/24 02/02/24 0305   Venous Patency/Care normal saline locked 02/02/24 0305   Arterial Patency/Care normal saline locked 02/02/24 0305   Distal Patency/Care normal saline locked 02/02/24 0305   Flows Good 02/02/24 0305   Waveform Normal 02/02/24 0305   Number of days: 1            Peripheral IV - Single Lumen 01/29/24 2116 Left Antecubital (Active)   Site Assessment Clean;Dry;Intact;No redness;No swelling 02/02/24 0305   Extremity Assessment Distal to IV No abnormal discoloration;No redness;No swelling;No warmth 02/02/24 0305   Line Status Infusing 02/02/24 0305   Dressing Status Clean;Dry;Intact 02/02/24 0305   Dressing Intervention Integrity maintained 02/02/24 0305   Dressing Change Due 02/02/24 02/02/24 0305   Site Change Due 02/02/24 02/02/24 0305   Reason Not Rotated Not due 02/02/24 0305   Number of days: 3       Arterial Line 01/31/24 1025 Right Radial (Active)   Site Assessment Clean;Dry;Intact;No redness;No swelling 02/02/24 0305   Line Status Pulsatile blood flow 02/02/24 0305   Art Line Waveform Appropriate;Square wave test performed 02/02/24 0305   Arterial Line Interventions Zeroed and calibrated;Leveled;Connections checked and tightened;Flushed per protocol 02/02/24 0305   Color/Movement/Sensation Capillary refill less than 3 sec 02/02/24 0305   Dressing Type Transparent (Tegaderm) 02/02/24 0305   Dressing Status Clean;Dry;Intact 02/02/24 0305   Dressing Intervention First dressing 02/02/24 0305   Dressing Change Due 02/05/24 02/02/24 0305   Tubing Change Due 02/05/24 02/02/24 0305   Number of days: 1            NG/OG Tube 02/01/24 2250 18 Fr. Center mouth (Active)   Placement Check placement verified by x-ray 02/02/24 0305   Securement secured to commercial device 02/02/24 0305   Clamp Status/Tolerance unclamped;no abdominal discomfort;no abdominal distention;no emesis;no  "nausea;no residual;no restlessness 02/02/24 0305   Suction Setting/Drainage Method suction at;low;intermittent setting 02/02/24 0305   Insertion Site Appearance no redness, warmth, tenderness, skin breakdown, drainage 02/02/24 0305   Drainage Brown 02/02/24 0305   Flush/Irrigation flushed w/;water;no resistance met 02/02/24 0305   Tube Output(mL)(Include Discarded Residual) 50 mL 02/02/24 0701   Number of days: 0            Urethral Catheter 01/29/24 2315 (Active)   Site Assessment Clean;Intact 02/02/24 0305   Collection Container Urimeter 02/02/24 0305   Securement Method secured to top of thigh w/ adhesive device 02/02/24 0305   Catheter Care Performed yes 02/02/24 0305   Reason for Continuing Urinary Catheterization Critically ill in ICU and requiring hourly monitoring of intake/output 02/02/24 0305   CAUTI Prevention Bundle Securement Device in place with 1" slack;Intact seal between catheter & drainage tubing;Drainage bag/urimeter off the floor;Sheeting clip in use;No dependent loops or kinks;Drainage bag/urimeter not overfilled (<2/3 full);Drainage bag/urimeter below bladder 02/02/24 0305   Output (mL) 0 mL 02/02/24 0701   Number of days: 3       Drips: None documented.      Patient Active Problem List   Diagnosis    Diabetic acidosis without coma    Ayaz's gangrene    Morbid obesity    Severe sepsis    ALVARADO (acute kidney injury)    Hyponatremia    Metabolic acidosis       Review of patient's allergies indicates:  No Known Allergies    Current Inpatient Medications:    sodium chloride 0.9%   Intravenous Once    sodium chloride 0.9%   Intravenous Once    chlorhexidine  15 mL Mouth/Throat BID    famotidine (PF)  20 mg Intravenous Daily    heparin (porcine)  5,000 Units Subcutaneous Q8H    insulin detemir U-100  12 Units Subcutaneous QHS    meropenem (MERREM) IVPB  1 g Intravenous Q12H    mupirocin   Nasal BID       No current facility-administered medications on file prior to encounter.     No current " outpatient medications on file prior to encounter.       Past Surgical History:   Procedure Laterality Date    INCISION AND DRAINAGE OF PERIRECTAL REGION N/A 1/29/2024    Procedure: INCISION AND DRAINAGE, PERIRECTAL REGION;  Surgeon: Axel Ramsay MD;  Location: [facility] OR;  Service: General;  Laterality: N/A;    PLACEMENT, TRIALYSIS CATH Right 1/31/2024    Procedure: INSERTION, CATHETER, TRIPLE LUMEN, HEMODIALYSIS, TEMPORARY;  Surgeon: Alvin Junior MD;  Location: [facility] OR;  Service: General;  Laterality: Right;    WOUND EXPLORATION Right 1/31/2024    Procedure: IRRIGATION & DEBRIDEMENT, WOUND DEBRIDEMENT;  Surgeon: Alvin Junior MD;  Location: [facility] OR;  Service: General;  Laterality: Right;       Social History:  Tobacco Use: Not on file       Alcohol Use: Not on file       OBJECTIVE:     Vital Signs Range:  BMI Readings from Last 1 Encounters:   02/02/24 58.53 kg/m²       Temp:  [36.8 °C (98.2 °F)-37.7 °C (99.9 °F)]   Pulse:  []   Resp:  [23-27]   BP: (142-161)/(65-74)   SpO2:  [97 %-100 %]   Arterial Line BP: (128-192)/(61-74)        Significant Labs:        Component Value Date/Time    WBC 22.87 (H) 02/02/2024 0335    HGB 8.8 (L) 02/02/2024 0335    HCT 25.4 (L) 02/02/2024 0335     02/02/2024 0335     02/02/2024 0335    K 3.5 02/02/2024 0335     02/02/2024 0335    CO2 20 (L) 02/02/2024 0335     (H) 02/02/2024 0335    BUN 26 (H) 02/02/2024 0335    CREATININE 3.7 (H) 02/02/2024 0335    MG 1.7 02/02/2024 0335    PHOS 3.4 02/02/2024 0335    CALCIUM 7.9 (L) 02/02/2024 0335    ALBUMIN 1.4 (L) 02/02/2024 0335    PROT 6.0 02/02/2024 0335    ALKPHOS 130 02/02/2024 0335    BILITOT 0.4 02/02/2024 0335    AST 37 02/02/2024 0335    ALT 18 02/02/2024 0335    INR 1.0 02/01/2024 2246    HGBA1C 10.4 (H) 01/30/2024 0042        Please see Results Review for additional labs.     Diagnostic Studies: No relevant studies.    EKG:   Results for orders placed or performed during the hospital  encounter of 01/29/24   EKG 12-lead    Collection Time: 01/29/24  7:14 PM    Narrative    Test Reason : R00.0,    Vent. Rate : 111 BPM     Atrial Rate : 111 BPM     P-R Int : 134 ms          QRS Dur : 102 ms      QT Int : 344 ms       P-R-T Axes : 070 066 -49 degrees     QTc Int : 467 ms    Sinus tachycardia  LVH with repolarization abnormality  Abnormal ECG  No previous ECGs available  Confirmed by Rubén Ortiz MD ([id number]) on 1/31/2024 6:01:21 AM    Referred By: GLEDNA   SELF           Confirmed By:Rubén Ortiz MD       ECHO:  See subjective, if available.      ASSESSMENT/PLAN:           Pre-op Assessment    I have reviewed the Patient Summary Reports.    I have reviewed the NPO Status.   I have reviewed the Medications.     Review of Systems  Anesthesia Hx:  No problems with previous Anesthesia   History of prior surgery of interest to airway management or planning:  Previous anesthesia: General        Denies Family Hx of Anesthesia complications.     Renal/:  Chronic Renal Disease        Kidney Function/Disease             Endocrine:  Diabetes    Diabetes                          Physical Exam  General: Unconscious    Airway:  Mallampati: unable to assess   Pre-Existing Airway: Oral Endotracheal tube    Chest/Lungs:  Clear to auscultation, Normal Respiratory Rate    Heart:  Rate: Normal  Rhythm: Regular Rhythm  Sounds: Normal        Anesthesia Plan  Type of Anesthesia, risks & benefits discussed:    Anesthesia Type: Gen ETT  Intra-op Monitoring Plan: Standard ASA Monitors and Art Line  Post Op Pain Control Plan: multimodal analgesia and IV/PO Opioids PRN  Induction:  IV  Airway Plan: Direct and Video, Post-Induction  Informed Consent: Informed consent signed with the Patient representative and all parties understand the risks and agree with anesthesia plan.  All questions answered.   ASA Score: 4  Day of Surgery Review of History & Physical: H&P Update referred to the surgeon/provider.I have interviewed  and examined the patient. I have reviewed the patient's H&P dated:     Ready For Surgery From Anesthesia Perspective.     .

## 2024-02-02 NOTE — CONSULTS
[facility]  Nephrology  Consult Note    Patient Name: Sarah Saravia  MRN: [id number]  Admission Date: 1/29/2024  Hospital Length of Stay: 4 days  Attending Provider: Steve Cole MD   Primary Care Physician: [facility]  Principal Problem:Ayaz's gangrene    Inpatient consult to Nephrology  Consult performed by: Delfina Shi DNP  Consult ordered by: David Stokes MD  Reason for consult: ALVARADO        Subjective:     HPI: Sarah Saravia is a 53 year old female with a past medical history of obesity (BMI 53) admitted with N/V and R groin wound found to be in DKA at OSH.  She underwent a CT abdomen and pelvis that showed extensive soft tissue air throughout the right groin and inguinal region and extending to involve the right lower quadrant anterior abdominal wall with extensive soft tissue air also seen involving the proximal medial aspect of the right thigh, concerning for gas-forming infection. She is s/p OR debridement for necrotizing fascitis on 1/29/24 and take back for 1/31/24. Right groin tissue growing proteus, susceptibilities still pending. Currently on meropenem and clindamycin which was d/c'd on 1/31/24. While at OSH pt developed acute respiratory failure requiring intubation. Nephrology was consulted for worsening alvarado in the setting of lactic acidosis and septic shock likely ATN, sCr elevated at 3.8 on admit.  OR today for debridement with possible closure and wound vac placement. Last HD 2/1. Net -1.3L. Electrolytes stable. Nephrology consulted for ALVARADO.     History reviewed. No pertinent past medical history.    Past Surgical History:   Procedure Laterality Date    INCISION AND DRAINAGE OF PERIRECTAL REGION N/A 1/29/2024    Procedure: INCISION AND DRAINAGE, PERIRECTAL REGION;  Surgeon: Axel Ramsay MD;  Location: [facility] OR;  Service: General;  Laterality: N/A;    PLACEMENT, TRIALYSIS CATH Right 1/31/2024    Procedure: INSERTION, CATHETER,  TRIPLE LUMEN, HEMODIALYSIS, TEMPORARY;  Surgeon: Alvin Junior MD;  Location: [facility] OR;  Service: General;  Laterality: Right;    WOUND EXPLORATION Right 1/31/2024    Procedure: IRRIGATION & DEBRIDEMENT, WOUND DEBRIDEMENT;  Surgeon: Alvin Junior MD;  Location: [facility] OR;  Service: General;  Laterality: Right;       Review of patient's allergies indicates:  No Known Allergies  Current Facility-Administered Medications   Medication Frequency    0.9%  NaCl infusion PRN    0.9%  NaCl infusion Once    0.9%  NaCl infusion PRN    0.9%  NaCl infusion Once    acetaminophen tablet 650 mg Q6H PRN    albuterol-ipratropium 2.5 mg-0.5 mg/3 mL nebulizer solution 3 mL Q4H PRN    cefTRIAXone (ROCEPHIN) 2 g in dextrose 5 % in water (D5W) 100 mL IVPB (MB+) Q24H    chlorhexidine 0.12 % solution 15 mL BID    dextrose 10% bolus 125 mL 125 mL PRN    famotidine (PF) injection 20 mg Daily    fentanyl IV bolus from bag/infusion 50 mcg Q1H PRN    glucagon (human recombinant) injection 1 mg PRN    heparin (porcine) injection 7,500 Units Q8H    hydrALAZINE injection 10 mg Q4H PRN    insulin aspart U-100 pen 0-10 Units Q4H PRN    insulin detemir U-100 (Levemir) pen 12 Units QHS    labetalol 20 mg/4 mL (5 mg/mL) IV syring Q6H PRN    melatonin tablet 6 mg Nightly PRN    metronidazole IVPB 500 mg Q8H    mupirocin 2 % ointment BID    naloxone 0.4 mg/mL injection 0.02 mg PRN    ondansetron injection 4 mg Q6H PRN    oxyCODONE immediate release tablet 5 mg Q6H PRN    polyethylene glycol packet 17 g Daily    potassium chloride 10 mEq in 100 mL IVPB PRN    And    potassium chloride 10 mEq in 100 mL IVPB PRN    And    potassium chloride 10 mEq in 100 mL IVPB PRN    prochlorperazine injection Soln 5 mg Q6H PRN    sodium chloride 0.9% bolus 250 mL 250 mL PRN    sodium chloride 0.9% bolus 250 mL 250 mL PRN    sodium chloride 0.9% flush 10 mL PRN     Family History    None       Tobacco Use    Smoking status: Not on file    Smokeless tobacco: Not on file    Substance and Sexual Activity    Alcohol use: Not on file    Drug use: Not on file    Sexual activity: Not on file     Review of Systems   Unable to perform ROS: Intubated   Objective:     Vital Signs (Most Recent):  Temp: 99.8 °F (37.7 °C) (02/02/24 0701)  Pulse: 104 (02/02/24 1129)  Resp: (!) 24 (02/02/24 1129)  BP: (!) 142/65 (02/02/24 0701)  SpO2: 99 % (02/02/24 1129) Vital Signs (24h Range):  Temp:  [98.2 °F (36.8 °C)-99.9 °F (37.7 °C)] 99.8 °F (37.7 °C)  Pulse:  [] 104  Resp:  [8-29] 24  SpO2:  [92 %-100 %] 99 %  BP: (142-186)/(65-85) 142/65  Arterial Line BP: (128-200)/(56-78) 140/65     Weight: (!) 164.5 kg (362 lb 10.5 oz) (02/02/24 0300)  Body mass index is 58.53 kg/m².  Body surface area is 2.77 meters squared.    I/O last 3 completed shifts:  In: 967.2 [I.V.:617.8; IV Piggyback:349.4]  Out: 3715 [Urine:65; Drains:150; Other:3500]     Physical Exam  Vitals and nursing note reviewed.   Constitutional:       Appearance: She is well-developed. She is ill-appearing.      Interventions: She is sedated and intubated.   HENT:      Head: Normocephalic and atraumatic.      Right Ear: External ear normal.      Left Ear: External ear normal.   Eyes:      General: No scleral icterus.        Right eye: No discharge.         Left eye: No discharge.      Conjunctiva/sclera: Conjunctivae normal.   Cardiovascular:      Rate and Rhythm: Normal rate and regular rhythm.      Pulses: Normal pulses.   Pulmonary:      Effort: She is intubated.      Comments: Intubated   Abdominal:      General: Bowel sounds are normal.   Musculoskeletal:         General: No tenderness.      Right lower leg: No edema.      Left lower leg: No edema.   Skin:     General: Skin is warm and dry.      Comments: Surgical wound covered with gauze dressing.   Neurological:      Mental Status: She is lethargic.      Significant Labs:  CBC:   Recent Labs   Lab 02/02/24  0335   WBC 22.87*   RBC 3.31*   HGB 8.8*   HCT 25.4*      MCV 77*   MCH  26.6*   MCHC 34.6     CMP:   Recent Labs   Lab 02/02/24  0335   *   CALCIUM 7.9*   ALBUMIN 1.4*   PROT 6.0      K 3.5   CO2 20*      BUN 26*   CREATININE 3.7*   ALKPHOS 130   ALT 18   AST 37   BILITOT 0.4     All labs within the past 24 hours have been reviewed.        Assessment/Plan:     Renal/  * Ayaz's gangrene  -management per primary     ALVARADO (acute kidney injury)  Transfer from [facility]. Admitted for fourniers gangrene. Initiated HD on 1/31. ALVARADO 2/2 ATN in setting of septic shock. Arrived with CR 3.8. Unknown baseline. Last HD 2/1/24. Plan to OR today. Net -1.3L.OR today for debridement with possible closure and wound vac placement       ALVARADO most likley ATN in setting of septic shock     -No emergent indication fro RRT at this time. Electrolytes stable. Net -1.3L/24 hours  -Will eval RRT needs daily   -Daily RFP   -Strict I&Os  -Avoid nephrotoxins, if possible     ID  Severe sepsis  -        Thank you for your consult. I will follow-up with patient. Please contact us if you have any additional questions.    Delfina Shi, POONAM  Nephrology  [facility]

## 2024-02-02 NOTE — CARE UPDATE
Received patient from OR placed on ventilator with documented settings. NARN. Will continue to monitor.

## 2024-02-02 NOTE — HPI
Sarah Saravia is a 53 year old female with a past medical history of obesity (BMI 53) admitted with N/V and R groin wound found to be in DKA at OSH.  She underwent a CT abdomen and pelvis that showed extensive soft tissue air throughout the right groin and inguinal region and extending to involve the right lower quadrant anterior abdominal wall with extensive soft tissue air also seen involving the proximal medial aspect of the right thigh, concerning for gas-forming infection. She is s/p OR debridement for necrotizing fascitis on 1/29/24 and take back for 1/31/24. Right groin tissue growing proteus, susceptibilities still pending. Currently on meropenem and clindamycin which was d/c'd on 1/31/24. While at OSH pt developed acute respiratory failure requiring intubation. Nephrology was consulted for worsening alvarado in the setting of lactic acidosis and septic shock likely ATN, sCr elevated at 3.8 on admit.  OR today for debridement with possible closure and wound vac placement. Last HD 2/1. Net -1.3L. Electrolytes stable. Nephrology consulted for ALVARADO.

## 2024-02-02 NOTE — PROGRESS NOTES
Pharmacokinetic Initial Assessment: IV Vancomycin    Assessment/Plan:    Patient initiated on vanc at OSH, 500 mg on 2/1 at 2028  Desired empiric serum trough concentration is 15 to 20 mcg/mL  Draw vancomycin random level on 2/2 at 0500 with AM labs.  Pharmacy will continue to follow and monitor vancomycin.      Please contact pharmacy at extension [phone] with any questions regarding this assessment.     Thank you for the consult,   Kathe Ingram       Patient brief summary:  Sarah Saravia is a 53 y.o. female initiated on antimicrobial therapy with IV Vancomycin for treatment of suspected skin & soft tissue infection/necrotizing fascitis    Drug Allergies:   Review of patient's allergies indicates:  No Known Allergies    Actual Body Weight:   149.7 kg    Renal Function:   Estimated Creatinine Clearance: 30.7 mL/min (A) (based on SCr of 3.2 mg/dL (H)).    Dialysis Method (if applicable):  intermittent HD    CBC (last 72 hours):  Recent Labs   Lab Result Units 01/30/24  0042 01/30/24  0447 01/31/24  0519 02/01/24  0322 02/01/24  2242   WBC K/uL 17.67* 14.73* 16.06* 18.15* 23.55*   Hemoglobin g/dL 13.0 12.0 11.3* 9.0* 9.1*   Hemoglobin A1C % 10.4*  --   --   --   --    Hematocrit % 42.1 37.9 35.0* 26.1* 26.3*   Platelets K/uL 216 186 197 194 215   Gran % % 89.2* 86.3* 58.0 82.0*  --    Lymph % % 7.1* 9.0* 9.0* 9.6*  --    Mono % % 2.8* 3.5* 4.0 4.1  --    Eosinophil % % 0.1 0.1 1.0 0.4  --    Basophil % % 0.2 0.3 0.0 0.7  --    Differential Method  Automated Automated Manual Automated  --        Metabolic Panel (last 72 hours):  Recent Labs   Lab Result Units 01/30/24  0042 01/30/24  0415 01/30/24  0447 01/30/24  0834 01/30/24  0958 01/30/24  1355 01/30/24  1633 01/30/24  2043 01/31/24  0100 01/31/24  0519 01/31/24  0520 01/31/24  0827 01/31/24  1558 02/01/24  0031 02/01/24  0322 02/01/24  0855 02/01/24  1933   Sodium mmol/L 134*  --  134* 136  --  134* 136 136 134* 136  --  135* 134* 138  --  137 138   Sodium,  Urine mmol/L  --   --   --   --  <20*  --   --   --   --   --   --   --   --   --   --   --   --    Potassium mmol/L 3.9  --  3.8 4.1  --  3.9 3.6 3.5 3.8 3.9  --  4.6 3.9 3.5  --  3.5 3.4*   Chloride mmol/L 103  --  103 109  --  105 105 106 105 103  --  107 102 99  --  98 98   CO2 mmol/L 15*  --  17* 18*  --  17* 20* 16* 16* 16*  --  14* 17* 22*  --  23 27   Glucose mg/dL 571*  --  412* 167*  --  198* 137* 125* 130* 145*  --  197* 334* 236*  --  266* 172*   Glucose, UA   --  4+*  --   --   --   --   --   --   --   --   --   --   --   --   --   --   --    BUN mg/dL 44*  --  46* 47*  --  47* 50* 50* 51* 51*  --  55* 58* 35*  --  40* 22*   Creatinine mg/dL 3.5*  --  3.6* 3.8*  --  3.9* 4.4* 4.6* 4.7* 5.0*  --  5.4* 5.4* 4.0*  --  4.9* 3.2*   Creatinine, Urine mg/dL  --   --   --   --  274.8  --   --   --   --   --   --   --   --   --   --   --   --    Phosphorus mg/dL 2.4*  --  1.9*  --   --   --   --   --   --   --  3.7  --   --   --  4.1  --   --        Drug levels (last 3 results):  Recent Labs   Lab Result Units 01/30/24  1355 01/31/24  0520 02/01/24  0322   Vancomycin, Random ug/mL 30.5 28.2 19.1       Microbiologic Results:  Microbiology Results (last 7 days)       Procedure Component Value Units Date/Time    Blood culture x two cultures. Draw prior to antibiotics. [[id number]] Collected: 01/29/24 2118    Order Status: Completed Specimen: Blood from Peripheral, Antecubital, Left Updated: 02/01/24 2303     Blood Culture, Routine No Growth to date      No Growth to date      No Growth to date      No Growth to date    Narrative:      Aerobic and anaerobic    Blood culture x two cultures. Draw prior to antibiotics. [[id number]] Collected: 01/29/24 1929    Order Status: Completed Specimen: Blood from Peripheral, Antecubital, Right Updated: 02/01/24 2103     Blood Culture, Routine No Growth to date      No Growth to date      No Growth to date      No Growth to date    Narrative:      Aerobic and anaerobic    Aerobic  culture [[id number]]  (Abnormal)  (Susceptibility) Collected: 01/30/24 0219    Order Status: Completed Specimen: Abscess from Groin Updated: 02/01/24 0953     Aerobic Bacterial Culture PROTEUS MIRABILIS  Moderate  Identification and susceptibility pending      Narrative:      Right groin abcess    Aerobic culture [[id number]]  (Abnormal)  (Susceptibility) Collected: 01/30/24 0228    Order Status: Completed Specimen: Wound from Groin Updated: 02/01/24 0953     Aerobic Bacterial Culture PROTEUS MIRABILIS  Moderate      Narrative:      Right groin tissue    Culture, Anaerobe [[id number]] Collected: 01/30/24 0228    Order Status: Completed Specimen: Wound from Groin Updated: 02/01/24 0755     Anaerobic Culture Culture in progress    Narrative:      Right groin tissue    Culture, Anaerobe [[id number]] Collected: 01/30/24 0219    Order Status: Completed Specimen: Abscess from Groin Updated: 02/01/24 0754     Anaerobic Culture Culture in progress    Narrative:      Right groin abcess    AFB Culture & Smear [[id number]] Collected: 01/30/24 0219    Order Status: Completed Specimen: Abscess from Groin Updated: 01/31/24 2127     AFB Culture & Smear Culture in progress     AFB CULTURE STAIN No acid fast bacilli seen.    Narrative:      Right groin abcess    AFB Culture & Smear [[id number]] Collected: 01/30/24 0228    Order Status: Completed Specimen: Wound from Groin Updated: 01/31/24 2127     AFB Culture & Smear Culture in progress     AFB CULTURE STAIN No acid fast bacilli seen.    Narrative:      Right groin tissue    Gram stain [[id number]] Collected: 01/30/24 0228    Order Status: Completed Specimen: Wound from Groin Updated: 01/30/24 1035     Gram Stain Result Moderate Gram positive cocci in pairs and chains      No WBC's    Narrative:      Right groin tissue    Gram stain [[id number]] Collected: 01/30/24 0219    Order Status: Completed Specimen: Abscess from Groin Updated: 01/30/24 1035     Gram Stain Result Moderate  Gram positive cocci in pairs and chains      No WBC's    Narrative:      Right groin abcess    Fungus culture [[id number]] Collected: 01/30/24 0219    Order Status: Sent Specimen: Abscess from Groin Updated: 01/30/24 0247    Fungus culture [[id number]] Collected: 01/30/24 0228    Order Status: Sent Specimen: Wound from Groin Updated: 01/30/24 0243    AFB Culture & Smear [[id number]] Collected: 01/30/24 0058    Order Status: Sent Specimen: Abscess from Groin Updated: 01/30/24 0058    AFB Culture & Smear [[id number]] Collected: 01/29/24 1115    Order Status: Sent Specimen: Abscess from Groin Updated: 01/30/24 0019    AFB Culture & Smear [[id number]] Collected: 01/29/24 1115    Order Status: Sent Specimen: Abscess from Groin Updated: 01/30/24 0019    Fungus culture [[id number]] Collected: 01/29/24 1115    Order Status: Canceled Specimen: Abscess from Groin     Gram stain [[id number]] Collected: 01/29/24 1115    Order Status: Canceled Specimen: Abscess from Groin     Culture, Anaerobe [[id number]] Collected: 01/29/24 1115    Order Status: Canceled Specimen: Abscess from Groin     Aerobic culture [[id number]] Collected: 01/29/24 1115    Order Status: Canceled Specimen: Abscess from Groin     Culture, Anaerobe [[id number]] Collected: 01/29/24 1115    Order Status: Canceled Specimen: Abscess from Groin     Aerobic culture [[id number]] Collected: 01/29/24 1115    Order Status: Canceled Specimen: Abscess from Groin     Gram stain [[id number]] Collected: 01/29/24 1115    Order Status: Canceled Specimen: Abscess from Groin     Fungus culture [[id number]] Collected: 01/29/24 1115    Order Status: Canceled Specimen: Abscess from Groin

## 2024-02-02 NOTE — NURSING TRANSFER
Nursing Transfer Note      2/1/2024   10:18 PM    Notified the receiving unit SICU that patient is in route via EMS. Number called was [phone].

## 2024-02-02 NOTE — BRIEF OP NOTE
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 Patient tolerated the procedure well. Hemostasis achieved     Estimated Blood Loss: * No values recorded between 2/2/2024  1:43 PM and 2/2/2024  2:26 PM *    Estimated Blood Loss has been documented.         Specimens:   Specimen (24h ago, onward)      None            [id number]

## 2024-02-02 NOTE — SUBJECTIVE & OBJECTIVE
Follow-up For: Procedure(s) (LRB):  IRRIGATION & DEBRIDEMENT, WOUND DEBRIDEMENT (Right)  INSERTION, CATHETER, TRIPLE LUMEN, HEMODIALYSIS, TEMPORARY (Right)    Post-Operative Day: 1 Day Post-Op     History reviewed. No pertinent past medical history.    Past Surgical History:   Procedure Laterality Date    INCISION AND DRAINAGE OF PERIRECTAL REGION N/A 1/29/2024    Procedure: INCISION AND DRAINAGE, PERIRECTAL REGION;  Surgeon: Axel Ramsay MD;  Location: [facility] OR;  Service: General;  Laterality: N/A;    PLACEMENT, TRIALYSIS CATH Right 1/31/2024    Procedure: INSERTION, CATHETER, TRIPLE LUMEN, HEMODIALYSIS, TEMPORARY;  Surgeon: Alvin Junior MD;  Location: [facility] OR;  Service: General;  Laterality: Right;    WOUND EXPLORATION Right 1/31/2024    Procedure: IRRIGATION & DEBRIDEMENT, WOUND DEBRIDEMENT;  Surgeon: Alvin Junior MD;  Location: [facility] OR;  Service: General;  Laterality: Right;       Review of patient's allergies indicates:  No Known Allergies    Family History    None       Tobacco Use    Smoking status: Not on file    Smokeless tobacco: Not on file   Substance and Sexual Activity    Alcohol use: Not on file    Drug use: Not on file    Sexual activity: Not on file      Review of Systems   Reason unable to perform ROS: Sedation.     Objective:     Vital Signs (Most Recent):  Temp: 99.8 °F (37.7 °C) (02/01/24 2237)  Pulse: 103 (02/01/24 2242)  Resp: (!) 26 (02/01/24 2242)  BP: (!) 161/74 (02/01/24 2100)  SpO2: 100 % (02/01/24 2242) Vital Signs (24h Range):  Temp:  [98.8 °F (37.1 °C)-100.9 °F (38.3 °C)] 99.8 °F (37.7 °C)  Pulse:  [] 103  Resp:  [8-34] 26  SpO2:  [92 %-100 %] 100 %  BP: (143-186)/(65-85) 161/74  Arterial Line BP: (143-200)/(51-73) 171/63     Weight: (!) 149.7 kg (330 lb)  Body mass index is 53.26 kg/m².      Intake/Output Summary (Last 24 hours) at 2/1/2024 2252  Last data filed at 2/1/2024 2150  Gross per 24 hour   Intake 884.7 ml   Output 2015 ml   Net -1130.3 ml          Physical  Exam  Vitals reviewed.   Constitutional:       Appearance: She is obese.   HENT:      Head: Atraumatic.   Cardiovascular:      Rate and Rhythm: Tachycardia present.      Pulses: Normal pulses.   Pulmonary:      Comments: Intubated   Abdominal:      General: Abdomen is flat. Bowel sounds are normal.      Palpations: Abdomen is soft.   Musculoskeletal:         General: Normal range of motion.   Skin:     Capillary Refill: Capillary refill takes less than 2 seconds.      Comments: Rt groin wound that is packed   Neurological:      General: No focal deficit present.   Psychiatric:         Mood and Affect: Mood normal.            Vents:  Vent Mode: A/C (02/01/24 2242)  Set Rate: 18 BPM (02/01/24 2242)  Vt Set: 420 mL (02/01/24 2242)  PEEP/CPAP: 5 cmH20 (02/01/24 2242)  Oxygen Concentration (%): 100 (02/01/24 2242)  Peak Airway Pressure: 17 cmH20 (02/01/24 2242)  Plateau Pressure: 0 cmH20 (02/01/24 2242)  Total Ve: 11.5 L/m (02/01/24 2242)  Negative Inspiratory Force (cm H2O): 0 (02/01/24 2242)  F/VT Ratio<105 (RSBI): (!) 58.17 (02/01/24 2242)    Lines/Drains/Airways       Central Venous Catheter Line  Duration             Trialysis (Dialysis) Catheter 01/31/24 1051 right internal jugular 1 day              Drain  Duration                  Urethral Catheter 01/29/24 2315 2 days              Airway  Duration                  Airway - Non-Surgical 01/31/24 1020 1 day              Arterial Line  Duration             Arterial Line 01/31/24 1025 Right Radial 1 day              Peripheral Intravenous Line  Duration                  Peripheral IV - Single Lumen 01/29/24 2116 Left Antecubital 3 days                    Significant Labs:    CBC/Anemia Profile:  Recent Labs   Lab 01/31/24  0519 02/01/24  0322   WBC 16.06* 18.15*   HGB 11.3* 9.0*   HCT 35.0* 26.1*    194   MCV 80* 76*   RDW 14.5 14.0        Chemistries:  Recent Labs   Lab 01/31/24  0520 01/31/24  0827 02/01/24  0031 02/01/24  0322 02/01/24  0855 02/01/24  1933    NA  --    < > 138  --  137 138   K  --    < > 3.5  --  3.5 3.4*   CL  --    < > 99  --  98 98   CO2  --    < > 22*  --  23 27   BUN  --    < > 35*  --  40* 22*   CREATININE  --    < > 4.0*  --  4.9* 3.2*   CALCIUM  --    < > 7.7*  --  7.7* 8.2*   PHOS 3.7  --   --  4.1  --   --     < > = values in this interval not displayed.           Significant Imaging:   Reviewed

## 2024-02-02 NOTE — NURSING
Pt transferred to SICU [place] [id number]. MD Easton, charge nurse, and RT notified of pts arrival. Pt connected to cardiac monitor and ventilator. Pt tachycardic, SBP >180. Pt not responsive to verbal or physical stimuli. MD Easton at bedside to assess pt. R. Groin dressing- intact with drainage. OG tube placed, labs and chest/abdomen xray ordered and completed. Plan of care ongoing. Family updated on patient's condition. All questions and concerns addressed.

## 2024-02-03 ENCOUNTER — DOCUMENTATION ONLY (OUTPATIENT)
Dept: NEUROLOGY | Facility: CLINIC | Age: 54
End: 2024-02-03
Payer: MEDICAID

## 2024-02-03 PROBLEM — G93.40 ENCEPHALOPATHY: Status: ACTIVE | Noted: 2024-02-03

## 2024-02-03 LAB
ALBUMIN SERPL BCP-MCNC: 1.5 G/DL (ref 3.5–5.2)
ALBUMIN SERPL BCP-MCNC: 1.5 G/DL (ref 3.5–5.2)
ALLENS TEST: ABNORMAL
ALP SERPL-CCNC: 134 U/L (ref 55–135)
ALT SERPL W/O P-5'-P-CCNC: 22 U/L (ref 10–44)
ANION GAP SERPL CALC-SCNC: 10 MMOL/L (ref 8–16)
ANION GAP SERPL CALC-SCNC: 16 MMOL/L (ref 8–16)
ANISOCYTOSIS BLD QL SMEAR: SLIGHT
AST SERPL-CCNC: 33 U/L (ref 10–40)
BASOPHILS NFR BLD: 0 % (ref 0–1.9)
BILIRUB SERPL-MCNC: 0.3 MG/DL (ref 0.1–1)
BUN SERPL-MCNC: 50 MG/DL (ref 6–20)
BUN SERPL-MCNC: 53 MG/DL (ref 6–20)
CALCIUM SERPL-MCNC: 7.5 MG/DL (ref 8.7–10.5)
CALCIUM SERPL-MCNC: 8.1 MG/DL (ref 8.7–10.5)
CHLORIDE SERPL-SCNC: 104 MMOL/L (ref 95–110)
CHLORIDE SERPL-SCNC: 98 MMOL/L (ref 95–110)
CO2 SERPL-SCNC: 22 MMOL/L (ref 23–29)
CO2 SERPL-SCNC: 22 MMOL/L (ref 23–29)
CREAT SERPL-MCNC: 5.2 MG/DL (ref 0.5–1.4)
CREAT SERPL-MCNC: 6.2 MG/DL (ref 0.5–1.4)
DELSYS: ABNORMAL
DIFFERENTIAL METHOD BLD: ABNORMAL
EOSINOPHIL NFR BLD: 0 % (ref 0–8)
ERYTHROCYTE [DISTWIDTH] IN BLOOD BY AUTOMATED COUNT: 14.5 % (ref 11.5–14.5)
ERYTHROCYTE [SEDIMENTATION RATE] IN BLOOD BY WESTERGREN METHOD: 18 MM/H
EST. GFR  (NO RACE VARIABLE): 7.5 ML/MIN/1.73 M^2
EST. GFR  (NO RACE VARIABLE): 9.3 ML/MIN/1.73 M^2
FIO2: 40
GIANT PLATELETS BLD QL SMEAR: PRESENT
GLUCOSE SERPL-MCNC: 345 MG/DL (ref 70–110)
GLUCOSE SERPL-MCNC: 369 MG/DL (ref 70–110)
HCO3 UR-SCNC: 24.9 MMOL/L (ref 24–28)
HCT VFR BLD AUTO: 24.1 % (ref 37–48.5)
HGB BLD-MCNC: 8.5 G/DL (ref 12–16)
HYPOCHROMIA BLD QL SMEAR: ABNORMAL
IMM GRANULOCYTES # BLD AUTO: ABNORMAL K/UL (ref 0–0.04)
IMM GRANULOCYTES NFR BLD AUTO: ABNORMAL % (ref 0–0.5)
LYMPHOCYTES NFR BLD: 8 % (ref 18–48)
MAGNESIUM SERPL-MCNC: 2.1 MG/DL (ref 1.6–2.6)
MAGNESIUM SERPL-MCNC: 2.3 MG/DL (ref 1.6–2.6)
MCH RBC QN AUTO: 26.8 PG (ref 27–31)
MCHC RBC AUTO-ENTMCNC: 35.3 G/DL (ref 32–36)
MCV RBC AUTO: 76 FL (ref 82–98)
METAMYELOCYTES NFR BLD MANUAL: 3 %
MODE: ABNORMAL
MONOCYTES NFR BLD: 1 % (ref 4–15)
MYELOCYTES NFR BLD MANUAL: 1 %
NEUTROPHILS NFR BLD: 82 % (ref 38–73)
NEUTS BAND NFR BLD MANUAL: 5 %
NRBC BLD-RTO: 0 /100 WBC
OVALOCYTES BLD QL SMEAR: ABNORMAL
PCO2 BLDA: 38.3 MMHG (ref 35–45)
PEEP: 5
PH SMN: 7.42 [PH] (ref 7.35–7.45)
PHOSPHATE SERPL-MCNC: 4 MG/DL (ref 2.7–4.5)
PHOSPHATE SERPL-MCNC: 6.3 MG/DL (ref 2.7–4.5)
PLATELET # BLD AUTO: 241 K/UL (ref 150–450)
PLATELET BLD QL SMEAR: ABNORMAL
PMV BLD AUTO: 11.2 FL (ref 9.2–12.9)
PO2 BLDA: 103 MMHG (ref 80–100)
POC BE: 0 MMOL/L
POC SATURATED O2: 98 % (ref 95–100)
POC TCO2: 26 MMOL/L (ref 23–27)
POCT GLUCOSE: 327 MG/DL (ref 70–110)
POCT GLUCOSE: 336 MG/DL (ref 70–110)
POCT GLUCOSE: 343 MG/DL (ref 70–110)
POCT GLUCOSE: 376 MG/DL (ref 70–110)
POCT GLUCOSE: 386 MG/DL (ref 70–110)
POCT GLUCOSE: 489 MG/DL (ref 70–110)
POIKILOCYTOSIS BLD QL SMEAR: SLIGHT
POLYCHROMASIA BLD QL SMEAR: ABNORMAL
POTASSIUM SERPL-SCNC: 3.8 MMOL/L (ref 3.5–5.1)
POTASSIUM SERPL-SCNC: 4.1 MMOL/L (ref 3.5–5.1)
PROT SERPL-MCNC: 6.6 G/DL (ref 6–8.4)
RBC # BLD AUTO: 3.17 M/UL (ref 4–5.4)
SAMPLE: ABNORMAL
SITE: ABNORMAL
SODIUM SERPL-SCNC: 136 MMOL/L (ref 136–145)
SODIUM SERPL-SCNC: 136 MMOL/L (ref 136–145)
SPHEROCYTES BLD QL SMEAR: ABNORMAL
TARGETS BLD QL SMEAR: ABNORMAL
TOXIC GRANULES BLD QL SMEAR: PRESENT
VT: 420
WBC # BLD AUTO: 27.03 K/UL (ref 3.9–12.7)

## 2024-02-03 PROCEDURE — 25000003 PHARM REV CODE 250: Performed by: STUDENT IN AN ORGANIZED HEALTH CARE EDUCATION/TRAINING PROGRAM

## 2024-02-03 PROCEDURE — 99232 SBSQ HOSP IP/OBS MODERATE 35: CPT | Mod: ,,, | Performed by: NURSE PRACTITIONER

## 2024-02-03 PROCEDURE — 99223 1ST HOSP IP/OBS HIGH 75: CPT | Mod: ,,, | Performed by: STUDENT IN AN ORGANIZED HEALTH CARE EDUCATION/TRAINING PROGRAM

## 2024-02-03 PROCEDURE — 90945 DIALYSIS ONE EVALUATION: CPT

## 2024-02-03 PROCEDURE — 25000003 PHARM REV CODE 250

## 2024-02-03 PROCEDURE — 94003 VENT MGMT INPAT SUBQ DAY: CPT

## 2024-02-03 PROCEDURE — 84100 ASSAY OF PHOSPHORUS: CPT

## 2024-02-03 PROCEDURE — 99233 SBSQ HOSP IP/OBS HIGH 50: CPT | Mod: ,,, | Performed by: INTERNAL MEDICINE

## 2024-02-03 PROCEDURE — 20000000 HC ICU ROOM

## 2024-02-03 PROCEDURE — 95718 EEG PHYS/QHP 2-12 HR W/VEEG: CPT | Mod: ,,, | Performed by: PSYCHIATRY & NEUROLOGY

## 2024-02-03 PROCEDURE — 94761 N-INVAS EAR/PLS OXIMETRY MLT: CPT | Mod: XB

## 2024-02-03 PROCEDURE — 82803 BLOOD GASES ANY COMBINATION: CPT

## 2024-02-03 PROCEDURE — 99291 CRITICAL CARE FIRST HOUR: CPT | Mod: 24,,, | Performed by: STUDENT IN AN ORGANIZED HEALTH CARE EDUCATION/TRAINING PROGRAM

## 2024-02-03 PROCEDURE — 80069 RENAL FUNCTION PANEL: CPT | Performed by: STUDENT IN AN ORGANIZED HEALTH CARE EDUCATION/TRAINING PROGRAM

## 2024-02-03 PROCEDURE — 83735 ASSAY OF MAGNESIUM: CPT | Mod: 91 | Performed by: STUDENT IN AN ORGANIZED HEALTH CARE EDUCATION/TRAINING PROGRAM

## 2024-02-03 PROCEDURE — 63600175 PHARM REV CODE 636 W HCPCS: Performed by: NURSE PRACTITIONER

## 2024-02-03 PROCEDURE — 63600175 PHARM REV CODE 636 W HCPCS

## 2024-02-03 PROCEDURE — 99900026 HC AIRWAY MAINTENANCE (STAT)

## 2024-02-03 PROCEDURE — 85007 BL SMEAR W/DIFF WBC COUNT: CPT

## 2024-02-03 PROCEDURE — 25000003 PHARM REV CODE 250: Performed by: INTERNAL MEDICINE

## 2024-02-03 PROCEDURE — 85027 COMPLETE CBC AUTOMATED: CPT

## 2024-02-03 PROCEDURE — 63600175 PHARM REV CODE 636 W HCPCS: Mod: JZ,JG | Performed by: INTERNAL MEDICINE

## 2024-02-03 PROCEDURE — 25000003 PHARM REV CODE 250: Performed by: NURSE PRACTITIONER

## 2024-02-03 PROCEDURE — 99900035 HC TECH TIME PER 15 MIN (STAT)

## 2024-02-03 PROCEDURE — 37799 UNLISTED PX VASCULAR SURGERY: CPT

## 2024-02-03 PROCEDURE — 27100171 HC OXYGEN HIGH FLOW UP TO 24 HOURS

## 2024-02-03 PROCEDURE — 80053 COMPREHEN METABOLIC PANEL: CPT

## 2024-02-03 PROCEDURE — 63600175 PHARM REV CODE 636 W HCPCS: Performed by: HOSPITALIST

## 2024-02-03 PROCEDURE — 83735 ASSAY OF MAGNESIUM: CPT

## 2024-02-03 RX ORDER — INSULIN ASPART 100 [IU]/ML
10 INJECTION, SOLUTION INTRAVENOUS; SUBCUTANEOUS
Status: DISCONTINUED | OUTPATIENT
Start: 2024-02-04 | End: 2024-02-03

## 2024-02-03 RX ORDER — INSULIN ASPART 100 [IU]/ML
7 INJECTION, SOLUTION INTRAVENOUS; SUBCUTANEOUS
Status: DISCONTINUED | OUTPATIENT
Start: 2024-02-03 | End: 2024-02-03

## 2024-02-03 RX ORDER — IBUPROFEN 200 MG
16 TABLET ORAL
Status: DISCONTINUED | OUTPATIENT
Start: 2024-02-03 | End: 2024-02-22

## 2024-02-03 RX ORDER — GLUCAGON 1 MG
1 KIT INJECTION
Status: DISCONTINUED | OUTPATIENT
Start: 2024-02-03 | End: 2024-03-05 | Stop reason: HOSPADM

## 2024-02-03 RX ORDER — INSULIN ASPART 100 [IU]/ML
7 INJECTION, SOLUTION INTRAVENOUS; SUBCUTANEOUS
Status: DISCONTINUED | OUTPATIENT
Start: 2024-02-04 | End: 2024-02-03

## 2024-02-03 RX ORDER — INSULIN ASPART 100 [IU]/ML
5 INJECTION, SOLUTION INTRAVENOUS; SUBCUTANEOUS
Status: DISCONTINUED | OUTPATIENT
Start: 2024-02-03 | End: 2024-02-03

## 2024-02-03 RX ORDER — INSULIN ASPART 100 [IU]/ML
10 INJECTION, SOLUTION INTRAVENOUS; SUBCUTANEOUS EVERY 4 HOURS
Status: DISCONTINUED | OUTPATIENT
Start: 2024-02-04 | End: 2024-02-04

## 2024-02-03 RX ORDER — INSULIN ASPART 100 [IU]/ML
0-10 INJECTION, SOLUTION INTRAVENOUS; SUBCUTANEOUS EVERY 4 HOURS PRN
Status: DISCONTINUED | OUTPATIENT
Start: 2024-02-03 | End: 2024-02-27

## 2024-02-03 RX ORDER — DEXTROSE MONOHYDRATE 100 MG/ML
INJECTION, SOLUTION INTRAVENOUS CONTINUOUS PRN
Status: DISCONTINUED | OUTPATIENT
Start: 2024-02-03 | End: 2024-02-27

## 2024-02-03 RX ORDER — IBUPROFEN 200 MG
24 TABLET ORAL
Status: DISCONTINUED | OUTPATIENT
Start: 2024-02-03 | End: 2024-02-22

## 2024-02-03 RX ORDER — HYDROCODONE BITARTRATE AND ACETAMINOPHEN 500; 5 MG/1; MG/1
TABLET ORAL CONTINUOUS
Status: ACTIVE | OUTPATIENT
Start: 2024-02-03 | End: 2024-02-04

## 2024-02-03 RX ORDER — AMLODIPINE BESYLATE 5 MG/1
5 TABLET ORAL DAILY
Status: DISCONTINUED | OUTPATIENT
Start: 2024-02-03 | End: 2024-02-05

## 2024-02-03 RX ORDER — INSULIN ASPART 100 [IU]/ML
0-10 INJECTION, SOLUTION INTRAVENOUS; SUBCUTANEOUS
Status: DISCONTINUED | OUTPATIENT
Start: 2024-02-03 | End: 2024-02-03

## 2024-02-03 RX ORDER — INSULIN ASPART 100 [IU]/ML
10 INJECTION, SOLUTION INTRAVENOUS; SUBCUTANEOUS
Status: DISCONTINUED | OUTPATIENT
Start: 2024-02-03 | End: 2024-02-03

## 2024-02-03 RX ORDER — INSULIN ASPART 100 [IU]/ML
5 INJECTION, SOLUTION INTRAVENOUS; SUBCUTANEOUS
Status: DISCONTINUED | OUTPATIENT
Start: 2024-02-04 | End: 2024-02-03

## 2024-02-03 RX ORDER — ACETAMINOPHEN 325 MG/1
650 TABLET ORAL EVERY 6 HOURS
Status: DISCONTINUED | OUTPATIENT
Start: 2024-02-03 | End: 2024-02-07

## 2024-02-03 RX ORDER — MAGNESIUM SULFATE HEPTAHYDRATE 40 MG/ML
2 INJECTION, SOLUTION INTRAVENOUS
Status: ACTIVE | OUTPATIENT
Start: 2024-02-03 | End: 2024-02-04

## 2024-02-03 RX ORDER — GLUCAGON 1 MG
1 KIT INJECTION
Status: DISCONTINUED | OUTPATIENT
Start: 2024-02-03 | End: 2024-02-05

## 2024-02-03 RX ADMIN — INSULIN ASPART 5 UNITS: 100 INJECTION, SOLUTION INTRAVENOUS; SUBCUTANEOUS at 03:02

## 2024-02-03 RX ADMIN — HEPARIN SODIUM 7500 UNITS: 5000 INJECTION INTRAVENOUS; SUBCUTANEOUS at 05:02

## 2024-02-03 RX ADMIN — CHLORHEXIDINE GLUCONATE 0.12% ORAL RINSE 15 ML: 1.2 LIQUID ORAL at 09:02

## 2024-02-03 RX ADMIN — METRONIDAZOLE 500 MG: 5 INJECTION, SOLUTION INTRAVENOUS at 05:02

## 2024-02-03 RX ADMIN — HYDROMORPHONE HYDROCHLORIDE 0.5 MG: 0.5 INJECTION, SOLUTION INTRAMUSCULAR; INTRAVENOUS; SUBCUTANEOUS at 03:02

## 2024-02-03 RX ADMIN — INSULIN ASPART 5 UNITS: 100 INJECTION, SOLUTION INTRAVENOUS; SUBCUTANEOUS at 08:02

## 2024-02-03 RX ADMIN — INSULIN ASPART 5 UNITS: 100 INJECTION, SOLUTION INTRAVENOUS; SUBCUTANEOUS at 04:02

## 2024-02-03 RX ADMIN — FAMOTIDINE 20 MG: 10 INJECTION, SOLUTION INTRAVENOUS at 09:02

## 2024-02-03 RX ADMIN — MUPIROCIN: 20 OINTMENT TOPICAL at 08:02

## 2024-02-03 RX ADMIN — HYDRALAZINE HYDROCHLORIDE 10 MG: 20 INJECTION, SOLUTION INTRAMUSCULAR; INTRAVENOUS at 12:02

## 2024-02-03 RX ADMIN — ACETAMINOPHEN 650 MG: 325 TABLET ORAL at 12:02

## 2024-02-03 RX ADMIN — INSULIN ASPART 7 UNITS: 100 INJECTION, SOLUTION INTRAVENOUS; SUBCUTANEOUS at 03:02

## 2024-02-03 RX ADMIN — INSULIN ASPART 5 UNITS: 100 INJECTION, SOLUTION INTRAVENOUS; SUBCUTANEOUS at 12:02

## 2024-02-03 RX ADMIN — AMLODIPINE BESYLATE 5 MG: 5 TABLET ORAL at 09:02

## 2024-02-03 RX ADMIN — SODIUM CHLORIDE: 9 INJECTION, SOLUTION INTRAVENOUS at 06:02

## 2024-02-03 RX ADMIN — HYDROMORPHONE HYDROCHLORIDE 0.5 MG: 0.5 INJECTION, SOLUTION INTRAMUSCULAR; INTRAVENOUS; SUBCUTANEOUS at 12:02

## 2024-02-03 RX ADMIN — INSULIN ASPART 4 UNITS: 100 INJECTION, SOLUTION INTRAVENOUS; SUBCUTANEOUS at 12:02

## 2024-02-03 RX ADMIN — METRONIDAZOLE 500 MG: 5 INJECTION, SOLUTION INTRAVENOUS at 11:02

## 2024-02-03 RX ADMIN — CEFTRIAXONE 2 G: 2 INJECTION, POWDER, FOR SOLUTION INTRAMUSCULAR; INTRAVENOUS at 09:02

## 2024-02-03 RX ADMIN — HEPARIN SODIUM 7500 UNITS: 5000 INJECTION INTRAVENOUS; SUBCUTANEOUS at 03:02

## 2024-02-03 RX ADMIN — INSULIN ASPART 8 UNITS: 100 INJECTION, SOLUTION INTRAVENOUS; SUBCUTANEOUS at 12:02

## 2024-02-03 RX ADMIN — POLYETHYLENE GLYCOL 3350 17 G: 17 POWDER, FOR SOLUTION ORAL at 09:02

## 2024-02-03 RX ADMIN — ACETAMINOPHEN 650 MG: 325 TABLET ORAL at 05:02

## 2024-02-03 RX ADMIN — HYDRALAZINE HYDROCHLORIDE 10 MG: 20 INJECTION, SOLUTION INTRAMUSCULAR; INTRAVENOUS at 06:02

## 2024-02-03 RX ADMIN — INSULIN HUMAN 1.8 UNITS/HR: 1 INJECTION, SOLUTION INTRAVENOUS at 06:02

## 2024-02-03 RX ADMIN — MUPIROCIN: 20 OINTMENT TOPICAL at 09:02

## 2024-02-03 RX ADMIN — INSULIN ASPART 8 UNITS: 100 INJECTION, SOLUTION INTRAVENOUS; SUBCUTANEOUS at 08:02

## 2024-02-03 RX ADMIN — METRONIDAZOLE 500 MG: 5 INJECTION, SOLUTION INTRAVENOUS at 01:02

## 2024-02-03 RX ADMIN — CHLORHEXIDINE GLUCONATE 0.12% ORAL RINSE 15 ML: 1.2 LIQUID ORAL at 08:02

## 2024-02-03 RX ADMIN — HEPARIN SODIUM 7500 UNITS: 5000 INJECTION INTRAVENOUS; SUBCUTANEOUS at 11:02

## 2024-02-03 NOTE — ASSESSMENT & PLAN NOTE
Neuro/Psych:   -- Sedation: None  -- Pain: kermit tylenol, dialudid and oxy prn  -- GCS 7T: E2, V1T, M4; exam stable  -- CTH 2/3 with scattered hypoattenuation likely representing age indeterminate infarcts   -- Neurology following; appreciate recs  -- Fu EEG read  -- Fu Neuro if still recommending MRI w/o contrast             Cards:   -- HDS  -- goal SBP < 180, MAP >65  -- hypertensive, hydralazine and labetalol prns  -- Started amlodipine 5mg, will increase to 10mg tomorrow if still requiring multiple prns      Pulm:   #Acute Respiratory Failure  -- Intubated  -- CXR stable  -- Goal O2 sat > 90%  -- Daily SBT      Renal:  #ALVARADO on CKD  Received HD 2/1. ATN.   -- Nephrology following; appreciate recs  -- Nocturnal SLED; pulled off 2L UFso far  -- Net +750cc in last 24 hrs  -- Keep meza for strict I/O  -- Urine output/24h: 50cc  Recent Labs   Lab 02/03/24 0327 02/03/24 2240 02/04/24 0317   BUN 53* 50* 54*   CREATININE 6.2* 5.2* 5.2*      FEN / GI:   -- Daily CMPs  -- NGT in place   -- Replace lytes as needed  -- Nutrition: NPO, TF (Novasource Renal) at goal 30 ml/hr  -- GI PPX   -- Nutrition following; appreciate recs      ID:    #Ayaz's gangrene  s/p OR debridement for nec fascitis. R groin tissue with proteus, sensitive to ceftriaxone  -- Abx: ceftriaxone and flagyl  -- ID following ; appreciate recs  -- Debridement and wound vac change planned for 2/6  Recent Labs   Lab 02/02/24 0335 02/03/24 0327 02/04/24 0317   WBC 22.87* 27.03* 25.10*      Heme/Onc:  -- Daily CBC  -- Heparin DVT ppx  Recent Labs   Lab 02/01/24 2246 02/02/24 0335 02/03/24 0327 02/04/24 0317   HGB  --  8.8* 8.5* 7.9*   PLT  --  216 241 205   APTT 27.8  --   --   --    INR 1.0  --   --   --       Endo:   #IDDM  Initially presented to OSH with DKA with latest A1C 10.4 AG Gap resolved  - Increasing insulin prn requirements since starting tube feeds  - Placed on insulin gtt 2/3: Transition IV insulin infusion at 1.8 u/hr with stepdown  parameters (0.5 u/kg dosing)   - Increased Novolog to 10 units units q 4 hrs while on Tube feeds (HOLD if tube feeds are stopped or BG < 100)  - Moderate dose SSI  - Endocrine following, appreciate recs        PPx:   Feeding: NPO, TF @ 30  Analgesia/Sedation: See above  Thromboembolic prevention: SQH   HOB >30: Y  Stress Ulcer ppx: Famotidine  Glucose control: Critical care goal 140-180 g/dl, Insulin gtt, kermit novolog, SSI, Endo following  Bowel reg: N/A  Invasive Lines/Drains/Airway: Glynn, ETT, Art line, Trialysis, PIV x2      Dispo/Code Status/Palliative:   -- SICU / Full Code

## 2024-02-03 NOTE — SUBJECTIVE & OBJECTIVE
History reviewed. No pertinent past medical history.    Past Surgical History:   Procedure Laterality Date    INCISION AND DRAINAGE OF PERIRECTAL REGION N/A 1/29/2024    Procedure: INCISION AND DRAINAGE, PERIRECTAL REGION;  Surgeon: Axel Ramsay MD;  Location: [facility] OR;  Service: General;  Laterality: N/A;    PLACEMENT, TRIALYSIS CATH Right 1/31/2024    Procedure: INSERTION, CATHETER, TRIPLE LUMEN, HEMODIALYSIS, TEMPORARY;  Surgeon: Alvin Junior MD;  Location: [facility] OR;  Service: General;  Laterality: Right;    WOUND EXPLORATION Right 1/31/2024    Procedure: IRRIGATION & DEBRIDEMENT, WOUND DEBRIDEMENT;  Surgeon: Alvin Junior MD;  Location: [facility] OR;  Service: General;  Laterality: Right;       Review of patient's allergies indicates:  No Known Allergies    Current Neurological Medications: see below    No current facility-administered medications on file prior to encounter.     No current outpatient medications on file prior to encounter.     Family History    None       Tobacco Use    Smoking status: Not on file    Smokeless tobacco: Not on file   Substance and Sexual Activity    Alcohol use: Not on file    Drug use: Not on file    Sexual activity: Not on file     Review of Systems   Unable to perform ROS: Intubated     Objective:     Vital Signs (Most Recent):  Temp: 98.4 °F (36.9 °C) (02/03/24 1501)  Pulse: 91 (02/03/24 1700)  Resp: 18 (02/03/24 1700)  BP: (!) 145/67 (02/03/24 1700)  SpO2: 99 % (02/03/24 1700) Vital Signs (24h Range):  Temp:  [98.4 °F (36.9 °C)-99.2 °F (37.3 °C)] 98.4 °F (36.9 °C)  Pulse:  [83-99] 91  Resp:  [0-28] 18  SpO2:  [97 %-100 %] 99 %  BP: (124-163)/(58-73) 145/67  Arterial Line BP: (0-228)/(0-88) 149/59     Weight: (!) 164.5 kg (362 lb 10.5 oz)  Body mass index is 58.53 kg/m².     Physical Exam  Vitals and nursing note reviewed.   Constitutional:       Appearance: She is ill-appearing.   Cardiovascular:      Rate and Rhythm: Normal rate and regular rhythm.   Pulmonary:       Comments: Intubated  No sedation    No spontaneous eye opening  Does not follow commands  No dysconjugate gaze  Corneals intact b/l  Cough intact  Withdraws to pain, no localization  DTRs 2+ and symmetric throughout   Babinski downgoing  No clonus          Significant Labs: All pertinent lab results from the past 24 hours have been reviewed.    Significant Imaging: I have reviewed all pertinent imaging results/findings within the past 24 hours.

## 2024-02-03 NOTE — SUBJECTIVE & OBJECTIVE
Interval History: Pt seen and examined at bedside.  Intubated. Not following commands.     Medications:  Continuous Infusions:   sodium chloride 0.9%      dextrose 10 % in water (D10W)       Scheduled Meds:   sodium chloride 0.9%   Intravenous Once    sodium chloride 0.9%   Intravenous Once    acetaminophen  650 mg Per NG tube Q6H    amLODIPine  5 mg Oral Daily    cefTRIAXone (Rocephin) IV (PEDS and ADULTS)  2 g Intravenous Q24H    chlorhexidine  15 mL Mouth/Throat BID    famotidine (PF)  20 mg Intravenous Daily    heparin (porcine)  7,500 Units Subcutaneous Q8H    [START ON 2/4/2024] insulin aspart U-100  5 Units Subcutaneous Q24H    [START ON 2/4/2024] insulin aspart U-100  5 Units Subcutaneous Q24H    [START ON 2/4/2024] insulin aspart U-100  5 Units Subcutaneous Q24H    insulin aspart U-100  5 Units Subcutaneous Q24H    insulin aspart U-100  5 Units Subcutaneous Q24H    insulin aspart U-100  5 Units Subcutaneous Q24H    insulin detemir U-100  20 Units Subcutaneous QHS    metronidazole  500 mg Intravenous Q8H    mupirocin   Nasal BID    polyethylene glycol  17 g Per NG tube Daily     PRN Meds:sodium chloride 0.9%, sodium chloride 0.9%, albuterol-ipratropium, dextrose 10 % in water (D10W), dextrose 10%, dextrose 10%, glucagon (human recombinant), hydrALAZINE, HYDROmorphone, insulin aspart U-100, labetalol, magnesium sulfate IVPB, melatonin, naloxone, ondansetron, oxyCODONE, potassium chloride **AND** potassium chloride **AND** potassium chloride, prochlorperazine, sodium chloride 0.9%, sodium chloride 0.9%, sodium chloride 0.9%, sodium phosphate 20.01 mmol in dextrose 5 % (D5W) 250 mL IVPB, sodium phosphate 30 mmol in dextrose 5 % (D5W) 250 mL IVPB, sodium phosphate 39.99 mmol in dextrose 5 % (D5W) 250 mL IVPB     Review of patient's allergies indicates:  No Known Allergies  Objective:     Vital Signs (Most Recent):  Temp: 98.4 °F (36.9 °C) (02/03/24 0701)  Pulse: 91 (02/03/24 0800)  Resp: (!) 21 (02/03/24  0800)  BP: (!) 143/64 (02/03/24 0800)  SpO2: 99 % (02/03/24 0800) Vital Signs (24h Range):  Temp:  [98.4 °F (36.9 °C)-99.8 °F (37.7 °C)] 98.4 °F (36.9 °C)  Pulse:  [] 91  Resp:  [5-32] 21  SpO2:  [96 %-100 %] 99 %  BP: (124-212)/(58-98) 143/64  Arterial Line BP: (0-209)/(0-119) 160/62     Weight: (!) 164.5 kg (362 lb 10.5 oz)  Body mass index is 58.53 kg/m².    Intake/Output - Last 3 Shifts         02/01 0700  02/02 0659 02/02 0700  02/03 0659 02/03 0700  02/04 0659    I.V. (mL/kg) 460.4 (2.8) 0 (0)     Other 0      NG/GT  210 60    IV Piggyback 349.4 330.7     Total Intake(mL/kg) 809.7 (4.9) 540.7 (3.3) 60 (0.4)    Urine (mL/kg/hr) 50 (0) 20 (0) 5 (0)    Drains 150 525     Other 2000 50 0    Stool  0     Blood       Total Output 2200 595 5    Net -1390.3 -54.3 +55           Stool Occurrence  1 x              Physical Exam  Vitals reviewed.   Constitutional:       General: She is not in acute distress.     Appearance: She is obese. She is ill-appearing.   HENT:      Head: Normocephalic.   Neck:      Comments: Right IJ trialysis   Cardiovascular:      Rate and Rhythm: Regular rhythm. Tachycardia present.      Pulses: Normal pulses.   Pulmonary:      Effort: Pulmonary effort is normal.      Comments: Mechanically ventilated   Abdominal:      Palpations: Abdomen is soft.      Tenderness: There is no abdominal tenderness.   Genitourinary:     Comments: Glynn in place with clear yellow urine   Musculoskeletal:      Comments: Dressings C/D/I. Surrounding skin soft edematous, no crepitus    Skin:     General: Skin is warm and dry.   Neurological:      General: No focal deficit present.          Significant Labs:  I have reviewed all pertinent lab results within the past 24 hours.  CBC:   Recent Labs   Lab 02/03/24 0327   WBC 27.03*   RBC 3.17*   HGB 8.5*   HCT 24.1*      MCV 76*   MCH 26.8*   MCHC 35.3       CMP:   Recent Labs   Lab 02/03/24 0327   *   CALCIUM 8.1*   ALBUMIN 1.5*   PROT 6.6       K 4.1   CO2 22*   CL 98   BUN 53*   CREATININE 6.2*   ALKPHOS 134   ALT 22   AST 33   BILITOT 0.3         Significant Diagnostics:  I have reviewed all pertinent imaging results/findings within the past 24 hours.

## 2024-02-03 NOTE — PROGRESS NOTES
[facility] - [facility]  Nephrology  Progress Note    Patient Name: Sarah Saravia  MRN: [id number]  Admission Date: 1/29/2024  Hospital Length of Stay: 5 days  Attending Provider: Steve Cole MD   Primary Care Physician: [facility]  Principal Problem:Ayaz's gangrene    Subjective:     HPI: Sarah Saravia is a 53 year old female with a past medical history of obesity (BMI 53) admitted with N/V and R groin wound found to be in DKA at OSH.  She underwent a CT abdomen and pelvis that showed extensive soft tissue air throughout the right groin and inguinal region and extending to involve the right lower quadrant anterior abdominal wall with extensive soft tissue air also seen involving the proximal medial aspect of the right thigh, concerning for gas-forming infection. She is s/p OR debridement for necrotizing fascitis on 1/29/24 and take back for 1/31/24. Right groin tissue growing proteus, susceptibilities still pending. Currently on meropenem and clindamycin which was d/c'd on 1/31/24. While at OSH pt developed acute respiratory failure requiring intubation. Nephrology was consulted for worsening alvarado in the setting of lactic acidosis and septic shock likely ATN, sCr elevated at 3.8 on admit.  OR today for debridement with possible closure and wound vac placement. Last HD 2/1. Net -1.3L. Electrolytes stable. Nephrology consulted for ALVARADO.     Interval History: plan for CT head today, off sedation for 24 hours without robust neuro response.    Review of patient's allergies indicates:  No Known Allergies  Current Facility-Administered Medications   Medication Frequency    0.9%  NaCl infusion PRN    0.9%  NaCl infusion Once    0.9%  NaCl infusion PRN    0.9%  NaCl infusion Once    acetaminophen tablet 650 mg Q6H    albuterol-ipratropium 2.5 mg-0.5 mg/3 mL nebulizer solution 3 mL Q4H PRN    amLODIPine tablet 5 mg Daily    cefTRIAXone (ROCEPHIN) 2 g in dextrose 5 % in water  (D5W) 100 mL IVPB (MB+) Q24H    chlorhexidine 0.12 % solution 15 mL BID    dextrose 10 % infusion Continuous PRN    dextrose 10% bolus 125 mL 125 mL PRN    dextrose 10% bolus 250 mL 250 mL PRN    famotidine (PF) injection 20 mg Daily    glucagon (human recombinant) injection 1 mg PRN    heparin (porcine) injection 7,500 Units Q8H    hydrALAZINE injection 10 mg Q4H PRN    HYDROmorphone injection 0.5 mg Q1H PRN    insulin aspart U-100 pen 0-10 Units Q4H PRN    [START ON 2/4/2024] insulin aspart U-100 pen 5 Units Q24H    [START ON 2/4/2024] insulin aspart U-100 pen 5 Units Q24H    [START ON 2/4/2024] insulin aspart U-100 pen 5 Units Q24H    insulin aspart U-100 pen 5 Units Q24H    insulin aspart U-100 pen 5 Units Q24H    insulin aspart U-100 pen 5 Units Q24H    insulin detemir U-100 (Levemir) pen 16 Units QHS    labetalol 20 mg/4 mL (5 mg/mL) IV syring Q6H PRN    melatonin tablet 6 mg Nightly PRN    metronidazole IVPB 500 mg Q8H    mupirocin 2 % ointment BID    naloxone 0.4 mg/mL injection 0.02 mg PRN    ondansetron injection 4 mg Q6H PRN    oxyCODONE immediate release tablet 5 mg Q6H PRN    polyethylene glycol packet 17 g Daily    potassium chloride 10 mEq in 100 mL IVPB PRN    And    potassium chloride 10 mEq in 100 mL IVPB PRN    And    potassium chloride 10 mEq in 100 mL IVPB PRN    prochlorperazine injection Soln 5 mg Q6H PRN    sodium chloride 0.9% bolus 250 mL 250 mL PRN    sodium chloride 0.9% bolus 250 mL 250 mL PRN    sodium chloride 0.9% flush 10 mL PRN     Facility-Administered Medications Ordered in Other Encounters   Medication Frequency    propofol (DIPRIVAN) 10 mg/mL infusion PRN       Objective:     Vital Signs (Most Recent):  Temp: 98.4 °F (36.9 °C) (02/03/24 0701)  Pulse: 91 (02/03/24 0800)  Resp: (!) 21 (02/03/24 0800)  BP: (!) 143/64 (02/03/24 0800)  SpO2: 99 % (02/03/24 0800) Vital Signs (24h Range):  Temp:  [98.4 °F (36.9 °C)-99.8 °F (37.7 °C)] 98.4 °F (36.9 °C)  Pulse:  [] 91  Resp:   [5-32] 21  SpO2:  [96 %-100 %] 99 %  BP: (124-212)/(58-98) 143/64  Arterial Line BP: (0-209)/(0-119) 160/62     Weight: (!) 164.5 kg (362 lb 10.5 oz) (02/03/24 0300)  Body mass index is 58.53 kg/m².  Body surface area is 2.77 meters squared.    I/O last 3 completed shifts:  In: 818.5 [I.V.:115.3; NG/GT:210; IV Piggyback:493.3]  Out: 2795 [Urine:70; Drains:675; Other:2050]     Physical Exam  Constitutional:       General: She is not in acute distress. Right IJ trialysis     Appearance: She is obese. She is not ill-appearing or toxic-appearing.   Cardiovascular:      Rate and Rhythm: Normal rate.   Pulmonary:      Comments: Vent Mode: A/C  Oxygen Concentration (%):  [40] 40  Resp Rate Total:  [19 br/min-32 br/min] 22 br/min  Vt Set:  [420 mL] 420 mL  PEEP/CPAP:  [5 cmH20] 5 cmH20  Mean Airway Pressure:  [6.5 oxO99-13 cmH20] 11 cmH20      Abdominal:      General: There is distension.   Musculoskeletal:      Right lower leg: Edema present.      Left lower leg: Edema present.          Significant Labs:  All labs within the past 24 hours have been reviewed.     Significant Imaging:  Labs: Reviewed  Assessment/Plan:     Renal/  ALVARADO (acute kidney injury)  Transfer from [facility]. Admitted for fourniers gangrene. Initiated HD on 1/31. ALVARADO 2/2 ATN in setting of septic shock. Arrived with CR 3.8. Unknown baseline.  Plan to OR today. Net -1.3L.OR today for debridement with possible closure and wound vac placement     ALVARADO most likley ATN in setting of septic shock   Last HD 2/1/24.    Plan/Recommendation  -Nocturnal SLED today  -Will eval RRT needs daily   -Daily RFP   -Strict I&Os  -Avoid nephrotoxins, if possible         Thank you for your consult. I will follow-up with patient. Please contact us if you have any additional questions.    Enrique Stokes MD  Nephrology  [facility] - Surgical Intensive Care    ATTENDING PHYSICIAN ATTESTATION  I have personally verified the history and examined the patient. I thoroughly reviewed  the demographic, clinical, laboratorial and imaging information available in medical records. I agree with the assessment and recommendations provided by the subspecialty resident who was under my supervision.

## 2024-02-03 NOTE — ASSESSMENT & PLAN NOTE
53F. Hx obesity. Partner present at bedside, denies hx seizure. Px to OSH w/nausea and vomiting, right groin wound, found to in DKA. CT abdomen/pelvis w/extensive soft tissue air throughout the right groin and inguinal region extending to the RLQ, anterior abdominal wall, right proximal/medial thigh w/extensive soft tissue air concerning for gas-forming infection. S/p OR debridement for necrotizing fasciitis on 1/29 and 1/31: cx growing proteus. Hospital course c/b ALVARADO in setting of lactic acidosis and septic shock, presumably ATN, on SLED. Endo on board for DM mngmt. CTH on 2/3 with hypoattenuation along left anterior temporal lobe, bilateral centrum semiovale, left anterior corpus callosum, right caudate, subinsular region which may indicate prior infarcts. Of these, findings in the temporal lobe are most substantial. Previously on cefepime (off 12/31), meropenem (off on 2/2), now on ceftriaxone and flagyl. Fentanyl and propofol dc'ed on 2/2. PCA dc/ed 12/30. Patient remains encephalopathic following discontinuation of sedation. Remains intubated. Neurology consulted for encephalopathy.    Encephalopathy is likely multifactorial: neurotoxic antibiotic use (cefepime, meropenem), infection (nec fasc), acute renal failure. 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 In this setting, particularly w/left temporal lobe lesion, will need to rule out non-convulsive status.    Plan  -continuous EEG  -MRI brain w/o contrast after EEG  -please avoid neurotoxic antibiotics (carbapenems, cefepime) if possible  -continue to identify./treat toxic/metabolic abnormalities and infection  -neurology will continue to follow  -contact us with questions/concerns

## 2024-02-03 NOTE — ASSESSMENT & PLAN NOTE
53 y.o. 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     - CT head today  - To OR next week for wound vac change  - Daily SBTs  - Okay for DVT ppx   - Remainder of care per SICU

## 2024-02-03 NOTE — ASSESSMENT & PLAN NOTE
BG goal 140-180    No previous hx of T2DM per family at bedside. A1c of 10.4. DKA at OSH.     Plan:  -Increase Levemir to 20 units q HS (20% dose increase)   -Start Novolog 5 units q 4 hrs while on tube feeding (HOLD if tube feeding is stopped or BG < 100)   -Continue Moderate Dose Correction Scale  -BG monitoring q 4 hrs while NPO     ** Please call Endocrine for any BG related issues **      Discharge plans: TBD    Lab Results   Component Value Date    HGBA1C 10.4 (H) 01/30/2024

## 2024-02-03 NOTE — SUBJECTIVE & OBJECTIVE
"Interval HPI:   Overnight events: Remains in SICU. Intubated. POD 1 s/p debridement and wound vac exchange. BG trending up above goal ranges since tube feeds initiated overnight. Diet NPO    Eating:   NPO  Nausea: No  Hypoglycemia and intervention: No  Fever: No  TPN and/or TF: Yes  If yes, type of TF/TPN and rate: Novasource Renal at 30 ml/hr     BP (!) 143/64 (BP Location: Left forearm, Patient Position: Lying)   Pulse 91   Temp 98.4 °F (36.9 °C) (Oral)   Resp (!) 21   Ht 5' 6" (1.676 m)   Wt (!) 164.5 kg (362 lb 10.5 oz)   LMP 01/01/2024 (Approximate) Comment: tubal ligation  SpO2 99%   BMI 58.53 kg/m²     Labs Reviewed and Include    Recent Labs   Lab 02/03/24  0327   *   CALCIUM 8.1*   ALBUMIN 1.5*   PROT 6.6      K 4.1   CO2 22*   CL 98   BUN 53*   CREATININE 6.2*   ALKPHOS 134   ALT 22   AST 33   BILITOT 0.3     Lab Results   Component Value Date    WBC 27.03 (H) 02/03/2024    HGB 8.5 (L) 02/03/2024    HCT 24.1 (L) 02/03/2024    MCV 76 (L) 02/03/2024     02/03/2024     No results for input(s): "TSH", "FREET4" in the last 168 hours.  Lab Results   Component Value Date    HGBA1C 10.4 (H) 01/30/2024       Nutritional status:   Body mass index is 58.53 kg/m².  Lab Results   Component Value Date    ALBUMIN 1.5 (L) 02/03/2024    ALBUMIN 1.4 (L) 02/02/2024    ALBUMIN 1.6 (L) 02/01/2024     No results found for: "PREALBUMIN"    Estimated Creatinine Clearance: 16.8 mL/min (A) (based on SCr of 6.2 mg/dL (H)).    Accu-Checks  Recent Labs     02/02/24  0035 02/02/24  0618 02/02/24  0725 02/02/24  1229 02/02/24  1458 02/02/24  1705 02/02/24 2037 02/03/24  0039 02/03/24  0409 02/03/24  0818   POCTGLUCOSE 207* 237* 221* 229* 207* 240* 316* 336* 386* 327*       Current Medications and/or Treatments Impacting Glycemic Control  Immunotherapy:    Immunosuppressants       None          Steroids:   Hormones (From admission, onward)      Start     Stop Route Frequency Ordered    01/30/24 0114  " melatonin tablet 6 mg         -- Oral Nightly PRN 01/30/24 0016          Pressors:    Autonomic Drugs (From admission, onward)      None          Hyperglycemia/Diabetes Medications:   Antihyperglycemics (From admission, onward)      Start     Stop Route Frequency Ordered    02/04/24 0800  insulin aspart U-100 pen 5 Units         -- SubQ Every 24 hours (non-standard times) 02/03/24 0911    02/04/24 0400  insulin aspart U-100 pen 5 Units         -- SubQ Every 24 hours (non-standard times) 02/03/24 0911    02/04/24 0000  insulin aspart U-100 pen 5 Units         -- SubQ Every 24 hours (non-standard times) 02/03/24 0911    02/03/24 2000  insulin aspart U-100 pen 5 Units         -- SubQ Every 24 hours (non-standard times) 02/03/24 0911    02/03/24 1600  insulin aspart U-100 pen 5 Units         -- SubQ Every 24 hours (non-standard times) 02/03/24 0911    02/03/24 1200  insulin aspart U-100 pen 5 Units         -- SubQ Every 24 hours (non-standard times) 02/03/24 0911    02/03/24 1010  insulin aspart U-100 pen 0-10 Units         -- SubQ Every 4 hours PRN 02/03/24 0911    02/02/24 2100  insulin detemir U-100 (Levemir) pen 16 Units         -- SubQ Nightly 02/02/24 3823

## 2024-02-03 NOTE — PLAN OF CARE
Recommendations     1. Initiate TF Novasource Renal @ 10 mL/hr advancing to 30 mL/hr providing 1440 kcal, 65 g pro and 516 mL water meeting 100% EEN/EPN.    2. Continue to monitor for TF initiation and tolerance.    3. Monitor weights and labs.    4. Collaboration with medical providers     Goals: 1. Pt TF initiated by RD follow up  Nutrition Goal Status: new  Communication of RD Recs:  (POC)

## 2024-02-03 NOTE — CONSULTS
[facility]  Neurology  Consult Note    Patient Name: Sarah Saravia  MRN: [id number]  Admission Date: 1/29/2024  Hospital Length of Stay: 5 days  Code Status: Full Code   Attending Provider: Steve Cole MD   Consulting Provider: Jesús Walls DO  Primary Care Physician: [facility]  Principal Problem:Ayaz's gangrene    Inpatient consult to Neurology  Consult performed by: Jesús Walls DO  Consult ordered by: Chirag Borges MD         Subjective:     Chief Complaint:  encephalopathy     HPI:   53F. Hx obesity. Px to OSH w/nausea and vomiting, right groin wound, found to in DKA. CT abdomen/pelvis w/extensive soft tissue air throughout the right groin and inguinal region extending to the RLQ, anterior abdominal wall, right proximal/medial thigh w/extensive soft tissue air concerning for gas-forming infection. S/p OR debridement for necrotizing fasciitis on 1/29 and 1/31: cx growing proteus. Hospital course c/b ALVARADO in setting of lactic acidosis and septic shock, presumably ATN, on SLED. Nephro following. Endo on board for DM mngmt. CTH on 2/3 with hypoattenuation along left anterior temporal lobe w/encephalomalacia, bilateral centrum semiovale, left anterior corpus callosum, right caudate, subinsular region which may indicate prior infarcts. Of these, findings in the temporal lobe are most substantial. Previously on cefepime (off 12/31), meropenem (off 2/2), now on ceftriaxone and flagyl. Fentanyl and propofol dc'ed on 2/2. PCA dc'ed 12/30. Patient remains encephalopathic following discontinuation of sedation. Remains intubated. Neurology consulted for lack of improvement on exam for discontinuation of sedation.      History reviewed. No pertinent past medical history.    Past Surgical History:   Procedure Laterality Date    INCISION AND DRAINAGE OF PERIRECTAL REGION N/A 1/29/2024    Procedure: INCISION AND DRAINAGE, PERIRECTAL REGION;  Surgeon: Axel Ramsay  MD FLORENCIO;  Location: [facility] OR;  Service: General;  Laterality: N/A;    PLACEMENT, TRIALYSIS CATH Right 1/31/2024    Procedure: INSERTION, CATHETER, TRIPLE LUMEN, HEMODIALYSIS, TEMPORARY;  Surgeon: Alvin Junior MD;  Location: [facility] OR;  Service: General;  Laterality: Right;    WOUND EXPLORATION Right 1/31/2024    Procedure: IRRIGATION & DEBRIDEMENT, WOUND DEBRIDEMENT;  Surgeon: Alvin Junior MD;  Location: [facility] OR;  Service: General;  Laterality: Right;       Review of patient's allergies indicates:  No Known Allergies    Current Neurological Medications: see below    No current facility-administered medications on file prior to encounter.     No current outpatient medications on file prior to encounter.     Family History    None       Tobacco Use    Smoking status: Not on file    Smokeless tobacco: Not on file   Substance and Sexual Activity    Alcohol use: Not on file    Drug use: Not on file    Sexual activity: Not on file     Review of Systems   Unable to perform ROS: Intubated     Objective:     Vital Signs (Most Recent):  Temp: 98.4 °F (36.9 °C) (02/03/24 1501)  Pulse: 91 (02/03/24 1700)  Resp: 18 (02/03/24 1700)  BP: (!) 145/67 (02/03/24 1700)  SpO2: 99 % (02/03/24 1700) Vital Signs (24h Range):  Temp:  [98.4 °F (36.9 °C)-99.2 °F (37.3 °C)] 98.4 °F (36.9 °C)  Pulse:  [83-99] 91  Resp:  [0-28] 18  SpO2:  [97 %-100 %] 99 %  BP: (124-163)/(58-73) 145/67  Arterial Line BP: (0-228)/(0-88) 149/59     Weight: (!) 164.5 kg (362 lb 10.5 oz)  Body mass index is 58.53 kg/m².     Physical Exam  Vitals and nursing note reviewed.   Constitutional:       Appearance: She is ill-appearing.   Cardiovascular:      Rate and Rhythm: Normal rate and regular rhythm.   Pulmonary:      Comments: Intubated  No sedation    No spontaneous eye opening  Does not follow commands  No dysconjugate gaze  Corneals intact b/l  Cough intact  Withdraws to pain, no localization  DTRs 2+ and symmetric throughout   Babinski downgoing  No clonus           Significant Labs: All pertinent lab results from the past 24 hours have been reviewed.    Significant Imaging: I have reviewed all pertinent imaging results/findings within the past 24 hours.  Assessment and Plan:     Encephalopathy  53F. Hx obesity. Partner present at bedside, denies hx seizure. Px to OSH w/nausea and vomiting, right groin wound, found to in DKA. CT abdomen/pelvis w/extensive soft tissue air throughout the right groin and inguinal region extending to the RLQ, anterior abdominal wall, right proximal/medial thigh w/extensive soft tissue air concerning for gas-forming infection. S/p OR debridement for necrotizing fasciitis on 1/29 and 1/31: cx growing proteus. Hospital course c/b ALVARADO in setting of lactic acidosis and septic shock, presumably ATN, on SLED. Endo on board for DM mngmt. CTH on 2/3 with hypoattenuation along left anterior temporal lobe w/encephalomalacia, bilateral centrum semiovale, left anterior corpus callosum, right caudate, subinsular region which may indicate prior infarcts. Of these, findings in the temporal lobe are most substantial. Previously on cefepime (off 12/31), meropenem (off on 2/2), now on ceftriaxone and flagyl. Fentanyl and propofol dc'ed on 2/2. PCA dc/ed 12/30. Patient remains encephalopathic following discontinuation of sedation. Remains intubated. Neurology consulted for lack of improvement in exam.    Encephalopathy is likely multifactorial: neurotoxic antibiotic use (cefepime, meropenem), infection (necrotizing fasciitis), acute renal failure, sepsis, recent sedative use. 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 Particularly w/left temporal lobe lesion, will need to rule out non-convulsive status epilepticus. Discussed case and plan w/patient's significant other extensively at bedside.    Plan  -continuous EEG  -MRI brain w/o contrast after EEG  -please avoid neurotoxic  antibiotics (carbapenems, cefepime) if possible  -continue to identify/treat toxic/metabolic abnormalities and infection  -neurology will continue to follow  -contact us with questions/concerns        VTE Risk Mitigation (From admission, onward)           Ordered     heparin (porcine) injection 7,500 Units  Every 8 hours         02/02/24 0825     Reason for No Pharmacological VTE Prophylaxis  Once        Question:  Reasons:  Answer:  Risk of Bleeding    01/31/24 1331     IP VTE HIGH RISK PATIENT  Once         01/31/24 1331     Place sequential compression device  Until discontinued         01/30/24 0016     Place NATE hose  Until discontinued         01/30/24 0016                    Thank you for your consult. I will follow-up with patient. Please contact us if you have any additional questions.    Jesús Walls,   Neurology  [facility]

## 2024-02-03 NOTE — SUBJECTIVE & OBJECTIVE
Interval History: ".  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 Taken to OR on 2/2 for debridement of the right buttocks and groin (#3).     Review of Systems   Unable to perform ROS: Intubated     Objective:     Vital Signs (Most Recent):  Temp: 98.4 °F (36.9 °C) (02/03/24 0701)  Pulse: 91 (02/03/24 0800)  Resp: (!) 21 (02/03/24 0800)  BP: (!) 143/64 (02/03/24 0800)  SpO2: 99 % (02/03/24 0800) Vital Signs (24h Range):  Temp:  [98.4 °F (36.9 °C)-99.8 °F (37.7 °C)] 98.4 °F (36.9 °C)  Pulse:  [] 91  Resp:  [5-32] 21  SpO2:  [96 %-100 %] 99 %  BP: (124-212)/(58-98) 143/64  Arterial Line BP: (0-209)/(0-119) 160/62     Weight: (!) 164.5 kg (362 lb 10.5 oz)  Body mass index is 58.53 kg/m².    Estimated Creatinine Clearance: 16.8 mL/min (A) (based on SCr of 6.2 mg/dL (H)).     Physical Exam  Vitals and nursing note reviewed.   Constitutional:       Appearance: She is well-developed. She is ill-appearing.      Interventions: She is sedated and intubated.   HENT:      Head: Normocephalic and atraumatic.      Right Ear: External ear normal.      Left Ear: External ear normal.   Eyes:      General: No scleral icterus.        Right eye: No discharge.         Left eye: No discharge.      Conjunctiva/sclera: Conjunctivae normal.   Cardiovascular:      Rate and Rhythm: Normal rate and regular rhythm.      Heart sounds: Normal heart sounds. No murmur heard.     No friction rub. No gallop.   Pulmonary:      Effort: Pulmonary effort is normal. No respiratory distress. She is intubated.      Breath sounds: No stridor. Rhonchi present. No wheezing or rales.   Abdominal:      General: Bowel sounds are normal.   Musculoskeletal:         General: No tenderness.   Skin:     General: Skin is warm and dry.      Comments: Wound VAC on groin area   Neurological:      Mental Status: She is lethargic.          Significant Labs: BMP:   Recent Labs   Lab 02/03/24  0327   *   NA  136   K 4.1   CL 98   CO2 22*   BUN 53*   CREATININE 6.2*   CALCIUM 8.1*   MG 2.3     CBC:   Recent Labs   Lab 02/01/24  2242 02/02/24  0335 02/03/24  0327   WBC 23.55* 22.87* 27.03*   HGB 9.1* 8.8* 8.5*   HCT 26.3* 25.4* 24.1*    216 241     Microbiology Results (last 7 days)       Procedure Component Value Units Date/Time    Culture, Anaerobe [[id number]] Collected: 01/30/24 0228    Order Status: Completed Specimen: Wound from Groin Updated: 02/03/24 0758     Anaerobic Culture Culture in progress    Narrative:      Right groin tissue    Culture, Anaerobe [[id number]] Collected: 01/30/24 0219    Order Status: Completed Specimen: Abscess from Groin Updated: 02/03/24 0757     Anaerobic Culture Culture in progress    Narrative:      Right groin abcess    Blood culture x two cultures. Draw prior to antibiotics. [[id number]] Collected: 01/29/24 2118    Order Status: Completed Specimen: Blood from Peripheral, Antecubital, Left Updated: 02/02/24 2303     Blood Culture, Routine No Growth after 4 days.    Narrative:      Aerobic and anaerobic    Blood culture x two cultures. Draw prior to antibiotics. [[id number]] Collected: 01/29/24 1929    Order Status: Completed Specimen: Blood from Peripheral, Antecubital, Right Updated: 02/02/24 2103     Blood Culture, Routine No Growth after 4 days.    Narrative:      Aerobic and anaerobic    Aerobic culture [[id number]]  (Abnormal)  (Susceptibility) Collected: 01/30/24 0228    Order Status: Completed Specimen: Wound from Groin Updated: 02/02/24 0746     Aerobic Bacterial Culture PROTEUS MIRABILIS  Moderate      Narrative:      Right groin tissue    Aerobic culture [[id number]]  (Abnormal)  (Susceptibility) Collected: 01/30/24 0219    Order Status: Completed Specimen: Abscess from Groin Updated: 02/02/24 0746     Aerobic Bacterial Culture PROTEUS MIRABILIS  Moderate      Narrative:      Right groin abcess    AFB Culture & Smear [[id number]] Collected: 01/30/24 0219    Order  Status: Completed Specimen: Abscess from Groin Updated: 01/31/24 2127     AFB Culture & Smear Culture in progress     AFB CULTURE STAIN No acid fast bacilli seen.    Narrative:      Right groin abcess    AFB Culture & Smear [[id number]] Collected: 01/30/24 0228    Order Status: Completed Specimen: Wound from Groin Updated: 01/31/24 2127     AFB Culture & Smear Culture in progress     AFB CULTURE STAIN No acid fast bacilli seen.    Narrative:      Right groin tissue    Gram stain [[id number]] Collected: 01/30/24 0228    Order Status: Completed Specimen: Wound from Groin Updated: 01/30/24 1035     Gram Stain Result Moderate Gram positive cocci in pairs and chains      No WBC's    Narrative:      Right groin tissue    Gram stain [[id number]] Collected: 01/30/24 0219    Order Status: Completed Specimen: Abscess from Groin Updated: 01/30/24 1035     Gram Stain Result Moderate Gram positive cocci in pairs and chains      No WBC's    Narrative:      Right groin abcess    Fungus culture [[id number]] Collected: 01/30/24 0219    Order Status: Sent Specimen: Abscess from Groin Updated: 01/30/24 0247    Fungus culture [[id number]] Collected: 01/30/24 0228    Order Status: Sent Specimen: Wound from Groin Updated: 01/30/24 0243    AFB Culture & Smear [[id number]] Collected: 01/30/24 0058    Order Status: Sent Specimen: Abscess from Groin Updated: 01/30/24 0058    AFB Culture & Smear [[id number]] Collected: 01/29/24 1115    Order Status: Sent Specimen: Abscess from Groin Updated: 01/30/24 0019    AFB Culture & Smear [[id number]] Collected: 01/29/24 1115    Order Status: Sent Specimen: Abscess from Groin Updated: 01/30/24 0019    Fungus culture [[id number]] Collected: 01/29/24 1115    Order Status: Canceled Specimen: Abscess from Groin     Gram stain [[id number]] Collected: 01/29/24 1115    Order Status: Canceled Specimen: Abscess from Groin     Culture, Anaerobe [[id number]] Collected: 01/29/24 1115    Order Status: Canceled  Specimen: Abscess from Groin     Aerobic culture [[id number]] Collected: 01/29/24 1115    Order Status: Canceled Specimen: Abscess from Groin     Culture, Anaerobe [[id number]] Collected: 01/29/24 1115    Order Status: Canceled Specimen: Abscess from Groin     Aerobic culture [[id number]] Collected: 01/29/24 1115    Order Status: Canceled Specimen: Abscess from Groin     Gram stain [[id number]] Collected: 01/29/24 1115    Order Status: Canceled Specimen: Abscess from Groin     Fungus culture [[id number]] Collected: 01/29/24 1115    Order Status: Canceled Specimen: Abscess from Groin             Significant Imaging: I have reviewed all pertinent imaging results/findings within the past 24 hours.

## 2024-02-03 NOTE — SUBJECTIVE & OBJECTIVE
Interval History: plan for CT head today, off sedation for 24 hours without robust neuro response.    Review of patient's allergies indicates:  No Known Allergies  Current Facility-Administered Medications   Medication Frequency    0.9%  NaCl infusion PRN    0.9%  NaCl infusion Once    0.9%  NaCl infusion PRN    0.9%  NaCl infusion Once    acetaminophen tablet 650 mg Q6H    albuterol-ipratropium 2.5 mg-0.5 mg/3 mL nebulizer solution 3 mL Q4H PRN    amLODIPine tablet 5 mg Daily    cefTRIAXone (ROCEPHIN) 2 g in dextrose 5 % in water (D5W) 100 mL IVPB (MB+) Q24H    chlorhexidine 0.12 % solution 15 mL BID    dextrose 10 % infusion Continuous PRN    dextrose 10% bolus 125 mL 125 mL PRN    dextrose 10% bolus 250 mL 250 mL PRN    famotidine (PF) injection 20 mg Daily    glucagon (human recombinant) injection 1 mg PRN    heparin (porcine) injection 7,500 Units Q8H    hydrALAZINE injection 10 mg Q4H PRN    HYDROmorphone injection 0.5 mg Q1H PRN    insulin aspart U-100 pen 0-10 Units Q4H PRN    [START ON 2/4/2024] insulin aspart U-100 pen 5 Units Q24H    [START ON 2/4/2024] insulin aspart U-100 pen 5 Units Q24H    [START ON 2/4/2024] insulin aspart U-100 pen 5 Units Q24H    insulin aspart U-100 pen 5 Units Q24H    insulin aspart U-100 pen 5 Units Q24H    insulin aspart U-100 pen 5 Units Q24H    insulin detemir U-100 (Levemir) pen 16 Units QHS    labetalol 20 mg/4 mL (5 mg/mL) IV syring Q6H PRN    melatonin tablet 6 mg Nightly PRN    metronidazole IVPB 500 mg Q8H    mupirocin 2 % ointment BID    naloxone 0.4 mg/mL injection 0.02 mg PRN    ondansetron injection 4 mg Q6H PRN    oxyCODONE immediate release tablet 5 mg Q6H PRN    polyethylene glycol packet 17 g Daily    potassium chloride 10 mEq in 100 mL IVPB PRN    And    potassium chloride 10 mEq in 100 mL IVPB PRN    And    potassium chloride 10 mEq in 100 mL IVPB PRN    prochlorperazine injection Soln 5 mg Q6H PRN    sodium chloride 0.9% bolus 250 mL 250 mL PRN    sodium  chloride 0.9% bolus 250 mL 250 mL PRN    sodium chloride 0.9% flush 10 mL PRN     Facility-Administered Medications Ordered in Other Encounters   Medication Frequency    propofol (DIPRIVAN) 10 mg/mL infusion PRN       Objective:     Vital Signs (Most Recent):  Temp: 98.4 °F (36.9 °C) (02/03/24 0701)  Pulse: 91 (02/03/24 0800)  Resp: (!) 21 (02/03/24 0800)  BP: (!) 143/64 (02/03/24 0800)  SpO2: 99 % (02/03/24 0800) Vital Signs (24h Range):  Temp:  [98.4 °F (36.9 °C)-99.8 °F (37.7 °C)] 98.4 °F (36.9 °C)  Pulse:  [] 91  Resp:  [5-32] 21  SpO2:  [96 %-100 %] 99 %  BP: (124-212)/(58-98) 143/64  Arterial Line BP: (0-209)/(0-119) 160/62     Weight: (!) 164.5 kg (362 lb 10.5 oz) (02/03/24 0300)  Body mass index is 58.53 kg/m².  Body surface area is 2.77 meters squared.    I/O last 3 completed shifts:  In: 818.5 [I.V.:115.3; NG/GT:210; IV Piggyback:493.3]  Out: 2795 [Urine:70; Drains:675; Other:2050]     Physical Exam  Constitutional:       General: She is not in acute distress.     Appearance: She is obese. She is not ill-appearing or toxic-appearing.   Cardiovascular:      Rate and Rhythm: Normal rate.   Pulmonary:      Comments: Vent Mode: A/C  Oxygen Concentration (%):  [40] 40  Resp Rate Total:  [19 br/min-32 br/min] 22 br/min  Vt Set:  [420 mL] 420 mL  PEEP/CPAP:  [5 cmH20] 5 cmH20  Mean Airway Pressure:  [6.5 slL96-58 cmH20] 11 cmH20      Abdominal:      General: There is distension.   Musculoskeletal:      Right lower leg: Edema present.      Left lower leg: Edema present.          Significant Labs:  All labs within the past 24 hours have been reviewed.     Significant Imaging:  Labs: Reviewed

## 2024-02-03 NOTE — NURSING
SICU PLAN OF CARE NOTE    Dx: Ayaz's gangrene    Goals of Care:   Map >65  Systolic <180    Vital Signs (last 12 hours):   Temp:  [98.7 °F (37.1 °C)-99.2 °F (37.3 °C)]   Pulse:  [84-99]   Resp:  [5-28]   BP: (124-212)/(58-98)   SpO2:  [96 %-100 %]   Arterial Line BP: (0-209)/(0-87)      Neuro: Unresponsive, responds to pain    Cardiac: NSR    Respiratory: Ventilator    Urine Output: Urinary Catheter 20 cc/shift    Drains:  Wound vac 50 cc/shift   OG: residuals 175    Diet: Tube Feeds @ 30 cc/hr     Labs/Accuchecks: Daily labs. Q4 accuchecks.    Skin:  All skin remains free from injury.  Patient turned Q2, mattress inflated, and bed working correctly.    Shift Events:  PRN hydralazine given one time to maintain systolic goals. See flowsheet for further assessment/details. Family updated on current condition/plan of care, questions answered, and emotional support provided.  MD updated on current condition, vitals, labs, and gtts.  No new orders received, will continue to monitor. Possible plans of CT head, plans of OR on Monday to remove wound vac.

## 2024-02-03 NOTE — CONSULTS
Full assessment completed 2/01:       Recommendations     1. Initiate TF Novasource Renal @ 10 mL/hr advancing to 30 mL/hr providing 1440 kcal, 65 g pro and 516 mL water meeting 100% EEN/EPN.    2. Continue to monitor for TF initiation and tolerance.    3. Monitor weights and labs.    4. Collaboration with medical providers     Goals: 1. Pt TF initiated by RD follow up  Nutrition Goal Status: new  Communication of RD Recs:  (POC)    Thanks!    Wesley Combs Registration Eligible, Provisional LDN

## 2024-02-03 NOTE — PROGRESS NOTES
[facility] - [facility] Intensive Care  Critical Care - Surgery  Progress Note    Patient Name: Sarah Saravia  MRN: [id number]  Admission Date: 1/29/2024  Hospital Length of Stay: 5 days  Code Status: Full Code  Attending Provider: Steve Cole MD  [facility]   Principal Problem: Ayaz's gangrene    Subjective:     Hospital/ICU Course:  No notes on file    Interval History/Significant Events: Debridement and wound vac change in OR yesterday. NAEON. Hypertensive to 220s requiring prn. Sedation off for 24 hrs with improvement of GCS to 7T this am. Cr increased to 6.2 from 3.7.     Follow-up For: Procedure(s) (LRB):  DEBRIDEMENT, WOUND (Bilateral)    Post-Operative Day: 1 Day Post-Op    Objective:     Vital Signs (Most Recent):  Temp: 98.6 °F (37 °C) (02/03/24 0300)  Pulse: 83 (02/03/24 0500)  Resp: 20 (02/03/24 0500)  BP: 130/62 (02/03/24 0500)  SpO2: 99 % (02/03/24 0500) Vital Signs (24h Range):  Temp:  [98.6 °F (37 °C)-99.8 °F (37.7 °C)] 98.6 °F (37 °C)  Pulse:  [] 83  Resp:  [5-32] 20  SpO2:  [96 %-100 %] 99 %  BP: (124-212)/(58-98) 130/62  Arterial Line BP: (0-209)/(0-119) 167/65     Weight: (!) 164.5 kg (362 lb 10.5 oz)  Body mass index is 58.53 kg/m².      Intake/Output Summary (Last 24 hours) at 2/3/2024 0622  Last data filed at 2/3/2024 0500  Gross per 24 hour   Intake 510.73 ml   Output 595 ml   Net -84.27 ml          Physical Exam  Vitals reviewed.   Constitutional:       General: She is not in acute distress.     Appearance: She is obese. She is ill-appearing.   HENT:      Head: Normocephalic and atraumatic.      Nose: Nose normal.      Mouth/Throat:      Comments: intubated  Eyes:      Conjunctiva/sclera: Conjunctivae normal.      Comments: Brisk corneal and pupillary responses   Cardiovascular:      Rate and Rhythm: Normal rate.      Pulses: Normal pulses.   Pulmonary:      Effort: No respiratory distress.      Comments: Intubated   Abdominal:       General: Abdomen is flat. Bowel sounds are normal. There is no distension.      Palpations: Abdomen is soft.      Tenderness: There is no abdominal tenderness.   Musculoskeletal:         General: Normal range of motion.      Cervical back: Normal range of motion and neck supple.   Skin:     Capillary Refill: Capillary refill takes less than 2 seconds.      Comments: Rt groin with wound vac in place   Neurological:      General: No focal deficit present.      Comments: Withdraw to pain, eye opening to pain   Psychiatric:         Mood and Affect: Mood normal.            Vents:  Vent Mode: A/C (02/03/24 0347)  Set Rate: 18 BPM (02/03/24 0347)  Vt Set: 420 mL (02/03/24 0347)  PEEP/CPAP: 5 cmH20 (02/03/24 0347)  Oxygen Concentration (%): 40 (02/03/24 0500)  Peak Airway Pressure: 33 cmH20 (02/03/24 0347)  Plateau Pressure: 17 cmH20 (02/03/24 0347)  Total Ve: 10.6 L/m (02/03/24 0347)  Negative Inspiratory Force (cm H2O): 0 (02/03/24 0347)  F/VT Ratio<105 (RSBI): (!) 68.13 (02/03/24 0347)    Lines/Drains/Airways       Central Venous Catheter Line  Duration             Trialysis (Dialysis) Catheter 01/31/24 1051 right internal jugular 2 days              Drain  Duration                  Urethral Catheter 01/29/24 2315 4 days         NG/OG Tube 02/01/24 2250 18 Fr. Center mouth 1 day              Airway  Duration                  Airway - Non-Surgical 01/31/24 1020 2 days              Arterial Line  Duration             Arterial Line 01/31/24 1025 Right Radial 2 days              Peripheral Intravenous Line  Duration                  Peripheral IV - Single Lumen 01/29/24 2116 Left Antecubital 4 days                    Significant Labs:    CBC/Anemia Profile:  Recent Labs   Lab 02/01/24 2242 02/02/24 0335 02/03/24  0327   WBC 23.55* 22.87* 27.03*   HGB 9.1* 8.8* 8.5*   HCT 26.3* 25.4* 24.1*    216 241   MCV 77* 77* 76*   RDW 14.4 14.6* 14.5        Chemistries:  Recent Labs   Lab 02/01/24 2242 02/02/24 0335  02/03/24  0327    136 136   K 3.5 3.5 4.1   CL 98 100 98   CO2 23 20* 22*   BUN 25* 26* 53*   CREATININE 3.5* 3.7* 6.2*   CALCIUM 8.5* 7.9* 8.1*   ALBUMIN 1.6* 1.4* 1.5*   PROT 6.8 6.0 6.6   BILITOT 0.5 0.4 0.3   ALKPHOS 147* 130 134   ALT 22 18 22   AST 35 37 33   MG 1.8 1.7  --    PHOS 3.5 3.4  --        Significant Imaging:  I have reviewed all pertinent imaging results/findings within the past 24 hours.  Assessment/Plan:     Neuro  Encephalopathy, metabolic  2/2 Severe sepsis and sedative medications. Likely to resolve as source control has be achieved. Sedation remains off at this time. Neuro exam improving from previous. Sedation remains off at this time.     Pulmonary  Acute hypoxemic respiratory failure  Patient with Hypoxic Respiratory failure which is Acute.  she is not on home oxygen. Supplemental oxygen was provided and noted- Vent Mode: A/C  Oxygen Concentration (%):  [] 40  Resp Rate Total:  [19 br/min-28 br/min] 23 br/min  Vt Set:  [420 mL] 420 mL  PEEP/CPAP:  [5 cmH20] 5 cmH20  Mean Airway Pressure:  [6.8 cmH20-10 cmH20] 10 cmH20    .   Signs/symptoms of respiratory failure include- tachypnea, increased work of breathing, respiratory distress, use of accessory muscles, lethargy, and cyanosis. Contributing diagnoses includes - ARDS, Obesity Hypoventilation, and Pneumonia Labs and images were reviewed. Patient Has recent ABG, which has been reviewed. Will treat underlying causes and adjust management of respiratory failure as follows-     Renal/  * Ayaz's gangrene    Neuro/Psych:   -- Sedation: None  -- Pain: kermit tylenol, dialudid and oxy prn  -- GCS 7T: E2, V1T, M4  -- No focal deficit on exam with continued encephalopathy picture  -- Fu CT head  -- Fu ABG this am              Cards:   -- HDS  -- goal SBP < 180, MAP >65  -- Add amlodipine 5mg   -- hypertensive, hydralazine and labetalol prns      Pulm:   #Acute Respiratory Failure  -- Intubated  -- fu CXR this am  -- Goal O2 sat >  90%  -- Fu ABG  -- Wean as able      Renal:  #ALVARADO on CKD  Received HD 2/1. ATN.   -- Cr bump to 6.2 this am  -- Normal lactic. Oliguric.  -- Nephrology following; appreciate recs  -- Keep meza for strict I/O  -- Urine output/24h: 20cc  Recent Labs   Lab 02/01/24 2242 02/02/24 0335 02/03/24  0327   BUN 25* 26* 53*   CREATININE 3.5* 3.7* 6.2*        FEN / GI:   -- Daily CMPs  -- NGT in place   -- Replace lytes as needed  -- Nutrition: NPO, TF at 30 ml/hr  -- GI PPX   -- Nutrition consult placed      ID:    #Ayaz's gangrene  s/p OR debridement for nec fascitis. R groin tissue with proteus, sensitive to ceftriaxone  -- Switched to ceftriaxone and flagyl  -- ID following ; appreciate recs  Recent Labs   Lab 02/01/24 2242 02/02/24 0335 02/03/24  0327   WBC 23.55* 22.87* 27.03*        Heme/Onc:  -- Daily CBC  -- Heparin DVT ppx  Recent Labs   Lab 02/01/24 2242 02/01/24 2246 02/02/24 0335 02/03/24  0327   HGB 9.1*  --  8.8* 8.5*     --  216 241   APTT  --  27.8  --   --    INR  --  1.0  --   --         Endo:   #IDDM  Initially presented to OSH with DKA with latest A1C 10.4 AG Gap resolved. Started on 12u insulin.  - Increasing insulin prn requirements since starting tube feeds, increasing blood glucose to 336  - continue SQ regimen with 12U detimir and SSI  - Endocrine consulted for assistance. Appreciate help.         PPx:   Feeding: NPO, TF @ 30  Analgesia/Sedation: See above  Thromboembolic prevention: SQH   HOB >30: Y  Stress Ulcer ppx: Famotidine  Glucose control: Critical care goal 140-180 g/dl, SSI and detemir, Endo following  Bowel reg: N/A  Invasive Lines/Drains/Airway: Meza, ETT, Art line, Trialysis, PIV x2      Dispo/Code Status/Palliative:   -- SICU / Full Code     Acute renal failure with tubular necrosis  ARF likely from Severe Sepsis and ATN    - Nephrology consulted   - MAP >65  - Avoid nephrotoxic agents  - Renally dose medications         Wilian Soria MD  Critical Care -  Surgery  [facility] - Surgical Intensive Care

## 2024-02-03 NOTE — PROGRESS NOTES
[facility]  General Surgery  Progress Note    Subjective:     History of Present Illness:  53 year old morbidly obese female who does not routinely go to the doctor presents to the ED with malaise, nausea, vomiting, and groin, buttock, perineal swelling and tenderness. She began feeling ill a few days ago.     On arrival to the ED she is tachycardic to 120, hypertensive without fever. Labs demonstrate leukocytosis of 21, , with lactic acidosis and associated ALVARADO with cr 3.8, hyperglycemia with blood glucose of 824 accompanied by severe electrolyte derangements. CT imaging obtained demonstrates diffuse subcutaneous air along buttocks, perineum, anterior vulva, as well as bilateral thighs.     No prior abdominal surgeries. She denies problems with her heart or lungs. Non smoker. Does not routinely take any medications.    Post-Op Info:  Procedure(s) (LRB):  DEBRIDEMENT, WOUND (Bilateral)   1 Day Post-Op     Interval History: Pt seen and examined at bedside.  Intubated. Not following commands.     Medications:  Continuous Infusions:   sodium chloride 0.9%      dextrose 10 % in water (D10W)       Scheduled Meds:   sodium chloride 0.9%   Intravenous Once    sodium chloride 0.9%   Intravenous Once    acetaminophen  650 mg Per NG tube Q6H    amLODIPine  5 mg Oral Daily    cefTRIAXone (Rocephin) IV (PEDS and ADULTS)  2 g Intravenous Q24H    chlorhexidine  15 mL Mouth/Throat BID    famotidine (PF)  20 mg Intravenous Daily    heparin (porcine)  7,500 Units Subcutaneous Q8H    [START ON 2/4/2024] insulin aspart U-100  5 Units Subcutaneous Q24H    [START ON 2/4/2024] insulin aspart U-100  5 Units Subcutaneous Q24H    [START ON 2/4/2024] insulin aspart U-100  5 Units Subcutaneous Q24H    insulin aspart U-100  5 Units Subcutaneous Q24H    insulin aspart U-100  5 Units Subcutaneous Q24H    insulin aspart U-100  5 Units Subcutaneous Q24H    insulin detemir U-100  20 Units Subcutaneous QHS     metronidazole  500 mg Intravenous Q8H    mupirocin   Nasal BID    polyethylene glycol  17 g Per NG tube Daily     PRN Meds:sodium chloride 0.9%, sodium chloride 0.9%, albuterol-ipratropium, dextrose 10 % in water (D10W), dextrose 10%, dextrose 10%, glucagon (human recombinant), hydrALAZINE, HYDROmorphone, insulin aspart U-100, labetalol, magnesium sulfate IVPB, melatonin, naloxone, ondansetron, oxyCODONE, potassium chloride **AND** potassium chloride **AND** potassium chloride, prochlorperazine, sodium chloride 0.9%, sodium chloride 0.9%, sodium chloride 0.9%, sodium phosphate 20.01 mmol in dextrose 5 % (D5W) 250 mL IVPB, sodium phosphate 30 mmol in dextrose 5 % (D5W) 250 mL IVPB, sodium phosphate 39.99 mmol in dextrose 5 % (D5W) 250 mL IVPB     Review of patient's allergies indicates:  No Known Allergies  Objective:     Vital Signs (Most Recent):  Temp: 98.4 °F (36.9 °C) (02/03/24 0701)  Pulse: 91 (02/03/24 0800)  Resp: (!) 21 (02/03/24 0800)  BP: (!) 143/64 (02/03/24 0800)  SpO2: 99 % (02/03/24 0800) Vital Signs (24h Range):  Temp:  [98.4 °F (36.9 °C)-99.8 °F (37.7 °C)] 98.4 °F (36.9 °C)  Pulse:  [] 91  Resp:  [5-32] 21  SpO2:  [96 %-100 %] 99 %  BP: (124-212)/(58-98) 143/64  Arterial Line BP: (0-209)/(0-119) 160/62     Weight: (!) 164.5 kg (362 lb 10.5 oz)  Body mass index is 58.53 kg/m².    Intake/Output - Last 3 Shifts         02/01 0700  02/02 0659 02/02 0700  02/03 0659 02/03 0700 02/04 0659    I.V. (mL/kg) 460.4 (2.8) 0 (0)     Other 0      NG/GT  210 60    IV Piggyback 349.4 330.7     Total Intake(mL/kg) 809.7 (4.9) 540.7 (3.3) 60 (0.4)    Urine (mL/kg/hr) 50 (0) 20 (0) 5 (0)    Drains 150 525     Other 2000 50 0    Stool  0     Blood       Total Output 2200 595 5    Net -1390.3 -54.3 +55           Stool Occurrence  1 x             Physical Exam  Vitals reviewed.   Constitutional:       General: She is not in acute distress.     Appearance: She is obese. She is ill-appearing.   HENT:      Head:  Normocephalic.   Neck:      Comments: Right IJ trialysis   Cardiovascular:      Rate and Rhythm: Regular rhythm. Tachycardia present.      Pulses: Normal pulses.   Pulmonary:      Effort: Pulmonary effort is normal.      Comments: Mechanically ventilated   Abdominal:      Palpations: Abdomen is soft.      Tenderness: There is no abdominal tenderness.   Genitourinary:     Comments: Glynn in place with clear yellow urine   Musculoskeletal:      Comments: Dressings C/D/I. Surrounding skin soft edematous, no crepitus    Skin:     General: Skin is warm and dry.   Neurological:      General: No focal deficit present.          Significant Labs:  I have reviewed all pertinent lab results within the past 24 hours.  CBC:   Recent Labs   Lab 02/03/24  0327   WBC 27.03*   RBC 3.17*   HGB 8.5*   HCT 24.1*      MCV 76*   MCH 26.8*   MCHC 35.3       CMP:   Recent Labs   Lab 02/03/24  0327   *   CALCIUM 8.1*   ALBUMIN 1.5*   PROT 6.6      K 4.1   CO2 22*   CL 98   BUN 53*   CREATININE 6.2*   ALKPHOS 134   ALT 22   AST 33   BILITOT 0.3         Significant Diagnostics:  I have reviewed all pertinent imaging results/findings within the past 24 hours.  Assessment/Plan:     * Ayaz's gangrene  53 y.o. 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     - CT head today  - To OR next week for wound vac change  - Daily SBTs  - Okay for DVT ppx   - Remainder of care per SICU        Robinson Rousseau MD  General Surgery  [facility]

## 2024-02-03 NOTE — PROGRESS NOTES
"[facility]  Endocrinology  Progress Note    Admit Date: 1/29/2024     Reason for Consult: Management of T2DM, Hyperglycemia     Surgical Procedure and Date: n/a    Diabetes diagnosis year: new onset     Home Diabetes Medications:  none per chart review and family present at bedside     Lab Results   Component Value Date    HGBA1C 10.4 (H) 01/30/2024       Diabetes Complications include:     Hyperglycemia, DKA at OSH     Complicating diabetes co morbidities:   Obesity       HPI:   Patient is a 53 y.o. female with a diagnosis of obesity (BMI 53) admitted with N/V and R groin wound found to be in DKA at OSH. 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 While at OSH pt developed acute respiratory failure requiring intubation. Pulmonology was consulted for ventilator management and critical illness. Nephrology was consulted for worsening vishal in the setting of lactic acidosis and septic shock likely ATN, sCr elevated at 3.8 on admit now 4.0 post HD. Pt being admitted to SICU for surgical critical care management. Immediate plans include hemodynamic stabilization, weaning of respiratory support, and RRT.  Endocrinology consulted for management of T2DM.                 Interval HPI:   Overnight events: Remains in SICU. Intubated. POD 1 s/p debridement and wound vac exchange. BG trending up above goal ranges since tube feeds initiated overnight. Diet NPO    Eating:   NPO  Nausea: No  Hypoglycemia and intervention: No  Fever: No  TPN and/or TF: Yes  If yes, type of TF/TPN and rate: Novasource Renal at 30 ml/hr     BP (!) 143/64 (BP Location: Left forearm, Patient Position: Lying)   Pulse 91   Temp 98.4 °F (36.9 °C) (Oral)   Resp (!) 21   Ht 5' 6" (1.676 m)   Wt (!) 164.5 kg (362 lb 10.5 oz)   LMP 01/01/2024 (Approximate) Comment: tubal ligation  SpO2 99%   BMI 58.53 kg/m²     Labs Reviewed and " "Include    Recent Labs   Lab 02/03/24  0327   *   CALCIUM 8.1*   ALBUMIN 1.5*   PROT 6.6      K 4.1   CO2 22*   CL 98   BUN 53*   CREATININE 6.2*   ALKPHOS 134   ALT 22   AST 33   BILITOT 0.3     Lab Results   Component Value Date    WBC 27.03 (H) 02/03/2024    HGB 8.5 (L) 02/03/2024    HCT 24.1 (L) 02/03/2024    MCV 76 (L) 02/03/2024     02/03/2024     No results for input(s): "TSH", "FREET4" in the last 168 hours.  Lab Results   Component Value Date    HGBA1C 10.4 (H) 01/30/2024       Nutritional status:   Body mass index is 58.53 kg/m².  Lab Results   Component Value Date    ALBUMIN 1.5 (L) 02/03/2024    ALBUMIN 1.4 (L) 02/02/2024    ALBUMIN 1.6 (L) 02/01/2024     No results found for: "PREALBUMIN"    Estimated Creatinine Clearance: 16.8 mL/min (A) (based on SCr of 6.2 mg/dL (H)).    Accu-Checks  Recent Labs     02/02/24  0035 02/02/24  0618 02/02/24  0725 02/02/24  1229 02/02/24  1458 02/02/24  1705 02/02/24  2037 02/03/24  0039 02/03/24  0409 02/03/24  0818   POCTGLUCOSE 207* 237* 221* 229* 207* 240* 316* 336* 386* 327*       Current Medications and/or Treatments Impacting Glycemic Control  Immunotherapy:    Immunosuppressants       None          Steroids:   Hormones (From admission, onward)      Start     Stop Route Frequency Ordered    01/30/24 0114  melatonin tablet 6 mg         -- Oral Nightly PRN 01/30/24 0016          Pressors:    Autonomic Drugs (From admission, onward)      None          Hyperglycemia/Diabetes Medications:   Antihyperglycemics (From admission, onward)      Start     Stop Route Frequency Ordered    02/04/24 0800  insulin aspart U-100 pen 5 Units         -- SubQ Every 24 hours (non-standard times) 02/03/24 0911    02/04/24 0400  insulin aspart U-100 pen 5 Units         -- SubQ Every 24 hours (non-standard times) 02/03/24 0911    02/04/24 0000  insulin aspart U-100 pen 5 Units         -- SubQ Every 24 hours (non-standard times) 02/03/24 0911 02/03/24 2000  insulin " aspart U-100 pen 5 Units         -- SubQ Every 24 hours (non-standard times) 02/03/24 0911    02/03/24 1600  insulin aspart U-100 pen 5 Units         -- SubQ Every 24 hours (non-standard times) 02/03/24 0911    02/03/24 1200  insulin aspart U-100 pen 5 Units         -- SubQ Every 24 hours (non-standard times) 02/03/24 0911    02/03/24 1010  insulin aspart U-100 pen 0-10 Units         -- SubQ Every 4 hours PRN 02/03/24 0911    02/02/24 2100  insulin detemir U-100 (Levemir) pen 16 Units         -- SubQ Nightly 02/02/24 1329            ASSESSMENT and PLAN    Renal/  * Ayaz's gangrene  Managed per primary team  Optimize BG control        ALVARADO (acute kidney injury)  Lab Results   Component Value Date    CREATININE 6.2 (H) 02/03/2024     Avoid insulin stacking  Titrate insulin slowly       Endocrine  Morbid (severe) obesity due to excess calories  Body mass index is 58.53 kg/m².  May increase insulin resistance.         New onset type 2 diabetes mellitus  BG goal 140-180    No previous hx of T2DM per family at bedside. A1c of 10.4. DKA at OSH.     Plan:  -Increase Levemir to 20 units q HS (20% dose increase)   -Start Novolog 5 units q 4 hrs while on tube feeding (HOLD if tube feeding is stopped or BG < 100)   -Continue Moderate Dose Correction Scale  -BG monitoring q 4 hrs while NPO     ** Please call Endocrine for any BG related issues **      Discharge plans: TBD    Lab Results   Component Value Date    HGBA1C 10.4 (H) 01/30/2024             Zoie Muñiz NP  Endocrinology  [facility]

## 2024-02-03 NOTE — SUBJECTIVE & OBJECTIVE
Interval History/Significant Events: Debridement and wound vac change in OR yesterday. NAEON. Hypertensive to 220s requiring prn. Sedation off for 24 hrs with improvement of GCS to 7T this am. Cr increased to 6.2 from 3.7.     Follow-up For: Procedure(s) (LRB):  DEBRIDEMENT, WOUND (Bilateral)    Post-Operative Day: 1 Day Post-Op    Objective:     Vital Signs (Most Recent):  Temp: 98.6 °F (37 °C) (02/03/24 0300)  Pulse: 83 (02/03/24 0500)  Resp: 20 (02/03/24 0500)  BP: 130/62 (02/03/24 0500)  SpO2: 99 % (02/03/24 0500) Vital Signs (24h Range):  Temp:  [98.6 °F (37 °C)-99.8 °F (37.7 °C)] 98.6 °F (37 °C)  Pulse:  [] 83  Resp:  [5-32] 20  SpO2:  [96 %-100 %] 99 %  BP: (124-212)/(58-98) 130/62  Arterial Line BP: (0-209)/(0-119) 167/65     Weight: (!) 164.5 kg (362 lb 10.5 oz)  Body mass index is 58.53 kg/m².      Intake/Output Summary (Last 24 hours) at 2/3/2024 0622  Last data filed at 2/3/2024 0500  Gross per 24 hour   Intake 510.73 ml   Output 595 ml   Net -84.27 ml          Physical Exam  Vitals reviewed.   Constitutional:       General: She is not in acute distress.     Appearance: She is obese. She is ill-appearing.   HENT:      Head: Normocephalic and atraumatic.      Nose: Nose normal.      Mouth/Throat:      Comments: intubated  Eyes:      Conjunctiva/sclera: Conjunctivae normal.      Comments: Brisk corneal and pupillary responses   Cardiovascular:      Rate and Rhythm: Normal rate.      Pulses: Normal pulses.   Pulmonary:      Effort: No respiratory distress.      Comments: Intubated   Abdominal:      General: Abdomen is flat. Bowel sounds are normal. There is no distension.      Palpations: Abdomen is soft.      Tenderness: There is no abdominal tenderness.   Musculoskeletal:         General: Normal range of motion.      Cervical back: Normal range of motion and neck supple.   Skin:     Capillary Refill: Capillary refill takes less than 2 seconds.      Comments: Rt groin with wound vac in place    Neurological:      General: No focal deficit present.      Comments: Withdraw to pain, eye opening to pain   Psychiatric:         Mood and Affect: Mood normal.            Vents:  Vent Mode: A/C (02/03/24 0347)  Set Rate: 18 BPM (02/03/24 0347)  Vt Set: 420 mL (02/03/24 0347)  PEEP/CPAP: 5 cmH20 (02/03/24 0347)  Oxygen Concentration (%): 40 (02/03/24 0500)  Peak Airway Pressure: 33 cmH20 (02/03/24 0347)  Plateau Pressure: 17 cmH20 (02/03/24 0347)  Total Ve: 10.6 L/m (02/03/24 0347)  Negative Inspiratory Force (cm H2O): 0 (02/03/24 0347)  F/VT Ratio<105 (RSBI): (!) 68.13 (02/03/24 0347)    Lines/Drains/Airways       Central Venous Catheter Line  Duration             Trialysis (Dialysis) Catheter 01/31/24 1051 right internal jugular 2 days              Drain  Duration                  Urethral Catheter 01/29/24 2315 4 days         NG/OG Tube 02/01/24 2250 18 Fr. Center mouth 1 day              Airway  Duration                  Airway - Non-Surgical 01/31/24 1020 2 days              Arterial Line  Duration             Arterial Line 01/31/24 1025 Right Radial 2 days              Peripheral Intravenous Line  Duration                  Peripheral IV - Single Lumen 01/29/24 2116 Left Antecubital 4 days                    Significant Labs:    CBC/Anemia Profile:  Recent Labs   Lab 02/01/24 2242 02/02/24 0335 02/03/24 0327   WBC 23.55* 22.87* 27.03*   HGB 9.1* 8.8* 8.5*   HCT 26.3* 25.4* 24.1*    216 241   MCV 77* 77* 76*   RDW 14.4 14.6* 14.5        Chemistries:  Recent Labs   Lab 02/01/24 2242 02/02/24 0335 02/03/24 0327    136 136   K 3.5 3.5 4.1   CL 98 100 98   CO2 23 20* 22*   BUN 25* 26* 53*   CREATININE 3.5* 3.7* 6.2*   CALCIUM 8.5* 7.9* 8.1*   ALBUMIN 1.6* 1.4* 1.5*   PROT 6.8 6.0 6.6   BILITOT 0.5 0.4 0.3   ALKPHOS 147* 130 134   ALT 22 18 22   AST 35 37 33   MG 1.8 1.7  --    PHOS 3.5 3.4  --        Significant Imaging:  I have reviewed all pertinent imaging results/findings within the past 24  hours.

## 2024-02-03 NOTE — ASSESSMENT & PLAN NOTE
Lab Results   Component Value Date    CREATININE 6.2 (H) 02/03/2024     Avoid insulin stacking  Titrate insulin slowly

## 2024-02-03 NOTE — PROGRESS NOTES
[facility] - Surgical Intensive Care  Infectious Disease  Progress Note    Patient Name: Sarah Saravia  MRN: [id number]  Admission Date: 1/29/2024  Length of Stay: 5 days  Attending Physician: Steve Cole MD  [facility]    Isolation Status: No active isolations  Assessment/Plan:      Renal/  * Ayaz's gangrene  I have reviewed hospital notes from  SICU service and other specialty providers. I have also reviewed CBC, CMP/BMP,  cultures and imaging with my interpretation as documented.     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 Operative cultures, not yet finalized, showing P mirabilis. She is afebrile, and with ongoing leukocytosis.   Continue ceftriaxone 2 gm IV daily.  Continue metronidazole.   Will follow up culture results.  Surgical debridements as indicated for non viable tissue as per Surgical Service.  Discussed with the patients daughter and boyfriend at bedside.  Discussed management plan with the staff and/or members from SICU and General Surgery service.           Anticipated Disposition: unknown    Thank you for your consult. I will follow-up with patient. Please contact us if you have any additional questions.    Devika Andujar MD  Infectious Disease  [facility] - Surgical Intensive Care    50 minutes of total time spent on the encounter, which includes face to face time and non-face to face time preparing to see the patient (eg, review of tests), obtaining and/or reviewing separately obtained history, documenting clinical information in the electronic or other health record, independently interpreting results (not separately reported) and communicating results to the patient/family/caregiver, or care coordination (not separately reported).     Subjective:     Principal Problem:Ayaz's gangrene    HPI: A 53-year-old woman with morbid obesity as evidenced by a BMI of 50 3 (documented as 58 at [facility]) who originally presented to  [facility] with a wound to her posterior thigh, vomiting, and diarrhea for 3-4 days prior to admission.  On evaluation in [facility] ED she was noted to be afebrile tachycardic, and hypertensive.  Laboratory workup at that time revealed leukocytosis, elevated creatinine, elevated lactic acid, and elevated CRP.  Additionally it showed hyperglycemia with anion gap acidosis consistent with diabetic ketoacidosis.  CT imaging of the abdomen showed extensive soft tissue air throughout the right groin and inguinal region extending to the right lower quadrant of the anterior abdominal wall and medial aspect of the right thigh concerning for gas-forming infection.  She was admitted to hospital medicine service and taken to the OR by General surgery for debridement.  Empiric antibiotics with Zosyn, clindamycin, and vancomycin was started.  She underwent incision and drainage with debridement of the soft tissues down to the muscular fascia on 01/29.  She was then subsequently taken back to the OR on 01/31 where she underwent irrigation and debridement of the wound and insertion of a triple-lumen temporary hemodialysis catheter.  She was subsequently transitioned to meropenem and clindamycin while in [facility].  Unfortunately her hospital course was complicated by acute respiratory failure requiring intubation with mechanical ventilation and worsening ALVARADO prompting initiation of renal replacement therapy.  She was transferred to [facility] for higher level of care.  Upon transfer the patient's antibiotic therapy was deescalated to ceftriaxone.    Infectious disease is consulted for Antibiotic management: currently on merem, concern for nec fasciitis          Interval History: ".  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 Taken to OR on 2/2 for debridement of the right buttocks and groin (#3).     Review of Systems   Unable to perform ROS: Intubated      Objective:     Vital Signs (Most Recent):  Temp: 98.4 °F (36.9 °C) (02/03/24 0701)  Pulse: 91 (02/03/24 0800)  Resp: (!) 21 (02/03/24 0800)  BP: (!) 143/64 (02/03/24 0800)  SpO2: 99 % (02/03/24 0800) Vital Signs (24h Range):  Temp:  [98.4 °F (36.9 °C)-99.8 °F (37.7 °C)] 98.4 °F (36.9 °C)  Pulse:  [] 91  Resp:  [5-32] 21  SpO2:  [96 %-100 %] 99 %  BP: (124-212)/(58-98) 143/64  Arterial Line BP: (0-209)/(0-119) 160/62     Weight: (!) 164.5 kg (362 lb 10.5 oz)  Body mass index is 58.53 kg/m².    Estimated Creatinine Clearance: 16.8 mL/min (A) (based on SCr of 6.2 mg/dL (H)).     Physical Exam  Vitals and nursing note reviewed.   Constitutional:       Appearance: She is well-developed. She is ill-appearing.      Interventions: She is sedated and intubated.   HENT:      Head: Normocephalic and atraumatic.      Right Ear: External ear normal.      Left Ear: External ear normal.   Eyes:      General: No scleral icterus.        Right eye: No discharge.         Left eye: No discharge.      Conjunctiva/sclera: Conjunctivae normal.   Cardiovascular:      Rate and Rhythm: Normal rate and regular rhythm.      Heart sounds: Normal heart sounds. No murmur heard.     No friction rub. No gallop.   Pulmonary:      Effort: Pulmonary effort is normal. No respiratory distress. She is intubated.      Breath sounds: No stridor. Rhonchi present. No wheezing or rales.   Abdominal:      General: Bowel sounds are normal.   Musculoskeletal:         General: No tenderness.   Skin:     General: Skin is warm and dry.      Comments: Wound VAC on groin area   Neurological:      Mental Status: She is lethargic.          Significant Labs: BMP:   Recent Labs   Lab 02/03/24  0327   *      K 4.1   CL 98   CO2 22*   BUN 53*   CREATININE 6.2*   CALCIUM 8.1*   MG 2.3     CBC:   Recent Labs   Lab 02/01/24  2242 02/02/24  0335 02/03/24  0327   WBC 23.55* 22.87* 27.03*   HGB 9.1* 8.8* 8.5*   HCT 26.3* 25.4* 24.1*    216 241      Microbiology Results (last 7 days)       Procedure Component Value Units Date/Time    Culture, Anaerobe [[id number]] Collected: 01/30/24 0228    Order Status: Completed Specimen: Wound from Groin Updated: 02/03/24 0758     Anaerobic Culture Culture in progress    Narrative:      Right groin tissue    Culture, Anaerobe [[id number]] Collected: 01/30/24 0219    Order Status: Completed Specimen: Abscess from Groin Updated: 02/03/24 0757     Anaerobic Culture Culture in progress    Narrative:      Right groin abcess    Blood culture x two cultures. Draw prior to antibiotics. [[id number]] Collected: 01/29/24 2118    Order Status: Completed Specimen: Blood from Peripheral, Antecubital, Left Updated: 02/02/24 2303     Blood Culture, Routine No Growth after 4 days.    Narrative:      Aerobic and anaerobic    Blood culture x two cultures. Draw prior to antibiotics. [[id number]] Collected: 01/29/24 1929    Order Status: Completed Specimen: Blood from Peripheral, Antecubital, Right Updated: 02/02/24 2103     Blood Culture, Routine No Growth after 4 days.    Narrative:      Aerobic and anaerobic    Aerobic culture [[id number]]  (Abnormal)  (Susceptibility) Collected: 01/30/24 0228    Order Status: Completed Specimen: Wound from Groin Updated: 02/02/24 0746     Aerobic Bacterial Culture PROTEUS MIRABILIS  Moderate      Narrative:      Right groin tissue    Aerobic culture [[id number]]  (Abnormal)  (Susceptibility) Collected: 01/30/24 0219    Order Status: Completed Specimen: Abscess from Groin Updated: 02/02/24 0746     Aerobic Bacterial Culture PROTEUS MIRABILIS  Moderate      Narrative:      Right groin abcess    AFB Culture & Smear [[id number]] Collected: 01/30/24 0219    Order Status: Completed Specimen: Abscess from Groin Updated: 01/31/24 2127     AFB Culture & Smear Culture in progress     AFB CULTURE STAIN No acid fast bacilli seen.    Narrative:      Right groin abcess    AFB Culture & Smear [[id number]] Collected:  01/30/24 0228    Order Status: Completed Specimen: Wound from Groin Updated: 01/31/24 2127     AFB Culture & Smear Culture in progress     AFB CULTURE STAIN No acid fast bacilli seen.    Narrative:      Right groin tissue    Gram stain [[id number]] Collected: 01/30/24 0228    Order Status: Completed Specimen: Wound from Groin Updated: 01/30/24 1035     Gram Stain Result Moderate Gram positive cocci in pairs and chains      No WBC's    Narrative:      Right groin tissue    Gram stain [[id number]] Collected: 01/30/24 0219    Order Status: Completed Specimen: Abscess from Groin Updated: 01/30/24 1035     Gram Stain Result Moderate Gram positive cocci in pairs and chains      No WBC's    Narrative:      Right groin abcess    Fungus culture [[id number]] Collected: 01/30/24 0219    Order Status: Sent Specimen: Abscess from Groin Updated: 01/30/24 0247    Fungus culture [[id number]] Collected: 01/30/24 0228    Order Status: Sent Specimen: Wound from Groin Updated: 01/30/24 0243    AFB Culture & Smear [[id number]] Collected: 01/30/24 0058    Order Status: Sent Specimen: Abscess from Groin Updated: 01/30/24 0058    AFB Culture & Smear [[id number]] Collected: 01/29/24 1115    Order Status: Sent Specimen: Abscess from Groin Updated: 01/30/24 0019    AFB Culture & Smear [[id number]] Collected: 01/29/24 1115    Order Status: Sent Specimen: Abscess from Groin Updated: 01/30/24 0019    Fungus culture [[id number]] Collected: 01/29/24 1115    Order Status: Canceled Specimen: Abscess from Groin     Gram stain [[id number]] Collected: 01/29/24 1115    Order Status: Canceled Specimen: Abscess from Groin     Culture, Anaerobe [[id number]] Collected: 01/29/24 1115    Order Status: Canceled Specimen: Abscess from Groin     Aerobic culture [[id number]] Collected: 01/29/24 1115    Order Status: Canceled Specimen: Abscess from Groin     Culture, Anaerobe [[id number]] Collected: 01/29/24 1115    Order Status: Canceled Specimen: Abscess from  Groin     Aerobic culture [[id number]] Collected: 01/29/24 1115    Order Status: Canceled Specimen: Abscess from Groin     Gram stain [[id number]] Collected: 01/29/24 1115    Order Status: Canceled Specimen: Abscess from Groin     Fungus culture [[id number]] Collected: 01/29/24 1115    Order Status: Canceled Specimen: Abscess from Groin             Significant Imaging: I have reviewed all pertinent imaging results/findings within the past 24 hours.

## 2024-02-03 NOTE — NURSING
SICU PLAN OF CARE NOTE    Dx: Ayaz's gangrene    Goals of Care: SBP <180; MAPs >65    Vital Signs (last 12 hours):   Temp:  [99.1 °F (37.3 °C)-99.8 °F (37.7 °C)]   Pulse:  []   Resp:  [19-32]   BP: (135-212)/(64-98)   SpO2:  [96 %-100 %]   Arterial Line BP: (0-209)/(0-119)      Neuro: Unresponsive; withdraws to pain    Cardiac: NSR    Respiratory: Ventilator    Gtts: none    Urine Output: Anuric     Drains: Wound Vac, total output 0 / shift    Diet: Tube Feeds 10cc/hr     Labs/Accuchecks: Daily labs, Q4hr BG    Skin:  All skin remains free from injury.  Patient turned Q2, mattress inflated, and bed working correctly.    Shift Events:  Debridement & Woundvac placement @1300 See flowsheet for further assessment/details.  Family updated on current condition/plan of care, questions answered, and emotional support provided.  MD updated on current condition, vitals, labs, and gtts.  No new orders received, will continue to monitor.

## 2024-02-03 NOTE — ASSESSMENT & PLAN NOTE
Neuro/Psych:   -- Sedation: None  -- Pain: kermit tylenol, dialudid and oxy prn  -- GCS 7T: E2, V1T, M4  -- No focal deficit on exam with continued encephalopathy picture  -- Discuss benefit of CT head for possible cerebral edema  -- Fu ABG this am              Cards:   -- HDS  -- goal SBP < 180, MAP >65  -- hypertensive, hydralazine and labetalol prns      Pulm:   #Acute Respiratory Failure  -- Intubated  -- fu CXR this am  -- Goal O2 sat > 90%  -- Fu ABG  -- Wean as able      Renal:  #ALVARADO on CKD  Received HD 2/1. ATN.   -- Cr bump to 6.2 this am  -- Normal lactic. Oliguric.  -- Nephrology following; appreciate recs  -- Keep meza for strict I/O  -- Urine output/24h: 20cc  Recent Labs   Lab 02/01/24 2242 02/02/24 0335 02/03/24  0327   BUN 25* 26* 53*   CREATININE 3.5* 3.7* 6.2*        FEN / GI:   -- Daily CMPs  -- NGT in place   -- Replace lytes as needed  -- Nutrition: NPO, TF at 30 ml/hr  -- GI PPX   -- Nutrition consult placed      ID:    #Ayaz's gangrene  s/p OR debridement for nec fascitis. R groin tissue with proteus, sensitive to ceftriaxone  -- Switched to ceftriaxone and flagyl  -- ID following ; appreciate recs  Recent Labs   Lab 02/01/24 2242 02/02/24 0335 02/03/24  0327   WBC 23.55* 22.87* 27.03*        Heme/Onc:  -- Daily CBC  -- Heparin DVT ppx  Recent Labs   Lab 02/01/24 2242 02/01/24 2246 02/02/24 0335 02/03/24  0327   HGB 9.1*  --  8.8* 8.5*     --  216 241   APTT  --  27.8  --   --    INR  --  1.0  --   --         Endo:   #IDDM  Initially presented to OSH with DKA with latest A1C 10.4 AG Gap resolved. Started on 12u insulin.  - Increasing insulin prn requirements since starting tube feeds, increasing blood glucose to 336  - continue SQ regimen with 12U detimir and SSI  - Endocrine consulted for assistance. Appreciate help.         PPx:   Feeding: NPO, TF @ 30  Analgesia/Sedation: See above  Thromboembolic prevention: SQH   HOB >30: Y  Stress Ulcer ppx: Famotidine  Glucose control:  Critical care goal 140-180 g/dl, SSI and detemir, Endo following  Bowel reg: N/A  Invasive Lines/Drains/Airway: Glynn, ETT, Art line, Trialysis, PIV x2      Dispo/Code Status/Palliative:   -- SICU / Full Code

## 2024-02-03 NOTE — ANESTHESIA POSTPROCEDURE EVALUATION
Anesthesia Post Evaluation    Patient: Sarah Saravia    Procedure(s) Performed: Procedure(s) (LRB):  DEBRIDEMENT, WOUND (Bilateral)    Final Anesthesia Type: general      Patient location during evaluation: ICU  Patient participation: No - Unable to Participate, Intubation  Level of consciousness: sedated  Post-procedure vital signs: reviewed and stable  Pain management: adequate  Airway patency: patent    PONV status at discharge: No PONV  Anesthetic complications: no      Cardiovascular status: hemodynamically stable  Respiratory status: ventilator and intubated  Hydration status: hypervolemic  Follow-up not needed.              Vitals Value Taken Time   /60 02/02/24 1801   Temp 37.3 °C (99.1 °F) 02/02/24 1501   Pulse 94 02/02/24 1852   Resp 12 02/02/24 1852   SpO2 98 % 02/02/24 1852   Vitals shown include unvalidated device data.      No case tracking events are documented in the log.      Pain/Lucía Score: Pain Rating Prior to Med Admin: -- (leatha) (2/2/2024  2:55 PM)  Pain Rating Post Med Admin: 0 (2/1/2024  1:28 PM)

## 2024-02-03 NOTE — ASSESSMENT & PLAN NOTE
Transfer from [facility]. Admitted for fourniers gangrene. Initiated HD on 1/31. ALVARADO 2/2 ATN in setting of septic shock. Arrived with CR 3.8. Unknown baseline.  Plan to OR today. Net -1.3L.OR today for debridement with possible closure and wound vac placement     ALVARADO most likley ATN in setting of septic shock   Last HD 2/1/24.    Plan/Recommendation  -No emergent indication fro RRT at this time  Bicarb 22, pH 7.422/38/103/24.9  -Will eval RRT needs daily   -Daily RFP   -Strict I&Os  -Avoid nephrotoxins, if possible

## 2024-02-03 NOTE — HPI
53F w/ hx of obesity who presented to OSH w/ nausea and vomiting, right groin wound, found to be in DKA and acute renal failure on admit. CT abdomen/pelvis w/extensive soft tissue air throughout the right groin and inguinal region extending to the RLQ, anterior abdominal wall, right proximal/medial thigh w/extensive soft tissue air concerning for gas-forming infection. S/p OR debridement for necrotizing fasciitis on 1/29 and 1/31: cx growing proteus. After taken to OR patient remained intubated. CTH on 2/3 with hypoattenuation along left anterior temporal lobe, bilateral centrum semiovale, left anterior corpus callosum, right caudate, subinsular region which may indicate prior infarcts. Previously on cefepime (off 12/31), meropenem (off on 2/2), now on ceftriaxone and flagyl. Fentanyl and propofol dc'ed on 2/2. Neurology is consulted for persistent encephalopathy off sedation.

## 2024-02-03 NOTE — PROGRESS NOTES
Unable to place EEG due hair extensions sewn on the back of head. Nurse and charge was made aware    Teetee Celaya   02/03/2024 3:54 PM

## 2024-02-03 NOTE — ASSESSMENT & PLAN NOTE
I have reviewed hospital notes from  SICU service and other specialty providers. I have also reviewed CBC, CMP/BMP,  cultures and imaging with my interpretation as documented.     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 Operative cultures, not yet finalized, showing P mirabilis. She is afebrile, and with ongoing leukocytosis.   Continue ceftriaxone 2 gm IV daily.  Continue metronidazole.   Will follow up culture results.  Surgical debridements as indicated for non viable tissue as per Surgical Service.  Discussed with the patients daughter and boyfriend at bedside.  Discussed management plan with the staff and/or members from SICU and General Surgery service.

## 2024-02-04 ENCOUNTER — DOCUMENTATION ONLY (OUTPATIENT)
Dept: NEUROLOGY | Facility: CLINIC | Age: 54
End: 2024-02-04
Payer: MEDICAID

## 2024-02-04 LAB
ALBUMIN SERPL BCP-MCNC: 1.5 G/DL (ref 3.5–5.2)
ALBUMIN SERPL BCP-MCNC: 1.7 G/DL (ref 3.5–5.2)
ANION GAP SERPL CALC-SCNC: 12 MMOL/L (ref 8–16)
ANION GAP SERPL CALC-SCNC: 14 MMOL/L (ref 8–16)
ANISOCYTOSIS BLD QL SMEAR: SLIGHT
BASOPHILS # BLD AUTO: ABNORMAL K/UL (ref 0–0.2)
BASOPHILS NFR BLD: 0 % (ref 0–1.9)
BUN SERPL-MCNC: 42 MG/DL (ref 6–20)
BUN SERPL-MCNC: 54 MG/DL (ref 6–20)
CA-I BLDV-SCNC: 0.94 MMOL/L (ref 1.06–1.42)
CA-I BLDV-SCNC: 1.03 MMOL/L (ref 1.06–1.42)
CALCIUM SERPL-MCNC: 7.7 MG/DL (ref 8.7–10.5)
CALCIUM SERPL-MCNC: 8 MG/DL (ref 8.7–10.5)
CHLORIDE SERPL-SCNC: 102 MMOL/L (ref 95–110)
CHLORIDE SERPL-SCNC: 105 MMOL/L (ref 95–110)
CO2 SERPL-SCNC: 19 MMOL/L (ref 23–29)
CO2 SERPL-SCNC: 20 MMOL/L (ref 23–29)
CREAT SERPL-MCNC: 4.3 MG/DL (ref 0.5–1.4)
CREAT SERPL-MCNC: 5.2 MG/DL (ref 0.5–1.4)
DACRYOCYTES BLD QL SMEAR: ABNORMAL
DIFFERENTIAL METHOD BLD: ABNORMAL
DOHLE BOD BLD QL SMEAR: PRESENT
EOSINOPHIL # BLD AUTO: ABNORMAL K/UL (ref 0–0.5)
EOSINOPHIL NFR BLD: 0 % (ref 0–8)
ERYTHROCYTE [DISTWIDTH] IN BLOOD BY AUTOMATED COUNT: 14.6 % (ref 11.5–14.5)
EST. GFR  (NO RACE VARIABLE): 11.7 ML/MIN/1.73 M^2
EST. GFR  (NO RACE VARIABLE): 9.3 ML/MIN/1.73 M^2
GIANT PLATELETS BLD QL SMEAR: PRESENT
GLUCOSE SERPL-MCNC: 211 MG/DL (ref 70–110)
GLUCOSE SERPL-MCNC: 312 MG/DL (ref 70–110)
HCT VFR BLD AUTO: 22.7 % (ref 37–48.5)
HGB BLD-MCNC: 7.9 G/DL (ref 12–16)
HYPOCHROMIA BLD QL SMEAR: ABNORMAL
IMM GRANULOCYTES # BLD AUTO: ABNORMAL K/UL (ref 0–0.04)
IMM GRANULOCYTES NFR BLD AUTO: ABNORMAL % (ref 0–0.5)
LYMPHOCYTES # BLD AUTO: ABNORMAL K/UL (ref 1–4.8)
LYMPHOCYTES NFR BLD: 10 % (ref 18–48)
MAGNESIUM SERPL-MCNC: 2 MG/DL (ref 1.6–2.6)
MAGNESIUM SERPL-MCNC: 2.2 MG/DL (ref 1.6–2.6)
MCH RBC QN AUTO: 26.3 PG (ref 27–31)
MCHC RBC AUTO-ENTMCNC: 34.8 G/DL (ref 32–36)
MCV RBC AUTO: 76 FL (ref 82–98)
METAMYELOCYTES NFR BLD MANUAL: 4 %
MONOCYTES # BLD AUTO: ABNORMAL K/UL (ref 0.3–1)
MONOCYTES NFR BLD: 3 % (ref 4–15)
MYELOCYTES NFR BLD MANUAL: 4 %
NEUTROPHILS NFR BLD: 74 % (ref 38–73)
NEUTS BAND NFR BLD MANUAL: 5 %
NRBC BLD-RTO: 0 /100 WBC
OVALOCYTES BLD QL SMEAR: ABNORMAL
PHOSPHATE SERPL-MCNC: 3.5 MG/DL (ref 2.7–4.5)
PHOSPHATE SERPL-MCNC: 3.9 MG/DL (ref 2.7–4.5)
PHOSPHATE SERPL-MCNC: 3.9 MG/DL (ref 2.7–4.5)
PLATELET # BLD AUTO: 205 K/UL (ref 150–450)
PLATELET BLD QL SMEAR: ABNORMAL
PMV BLD AUTO: 11 FL (ref 9.2–12.9)
POCT GLUCOSE: 206 MG/DL (ref 70–110)
POCT GLUCOSE: 228 MG/DL (ref 70–110)
POCT GLUCOSE: 238 MG/DL (ref 70–110)
POCT GLUCOSE: 263 MG/DL (ref 70–110)
POCT GLUCOSE: 311 MG/DL (ref 70–110)
POCT GLUCOSE: 318 MG/DL (ref 70–110)
POIKILOCYTOSIS BLD QL SMEAR: SLIGHT
POLYCHROMASIA BLD QL SMEAR: ABNORMAL
POTASSIUM SERPL-SCNC: 3.6 MMOL/L (ref 3.5–5.1)
POTASSIUM SERPL-SCNC: 3.7 MMOL/L (ref 3.5–5.1)
RBC # BLD AUTO: 3 M/UL (ref 4–5.4)
SODIUM SERPL-SCNC: 136 MMOL/L (ref 136–145)
SODIUM SERPL-SCNC: 136 MMOL/L (ref 136–145)
SPHEROCYTES BLD QL SMEAR: ABNORMAL
TARGETS BLD QL SMEAR: ABNORMAL
TOXIC GRANULES BLD QL SMEAR: PRESENT
WBC # BLD AUTO: 25.1 K/UL (ref 3.9–12.7)

## 2024-02-04 PROCEDURE — 99232 SBSQ HOSP IP/OBS MODERATE 35: CPT | Mod: ,,, | Performed by: NURSE PRACTITIONER

## 2024-02-04 PROCEDURE — 99900026 HC AIRWAY MAINTENANCE (STAT)

## 2024-02-04 PROCEDURE — 63600175 PHARM REV CODE 636 W HCPCS

## 2024-02-04 PROCEDURE — 27100171 HC OXYGEN HIGH FLOW UP TO 24 HOURS

## 2024-02-04 PROCEDURE — 85007 BL SMEAR W/DIFF WBC COUNT: CPT | Performed by: STUDENT IN AN ORGANIZED HEALTH CARE EDUCATION/TRAINING PROGRAM

## 2024-02-04 PROCEDURE — 80069 RENAL FUNCTION PANEL: CPT | Mod: 91 | Performed by: STUDENT IN AN ORGANIZED HEALTH CARE EDUCATION/TRAINING PROGRAM

## 2024-02-04 PROCEDURE — 25000003 PHARM REV CODE 250: Performed by: INTERNAL MEDICINE

## 2024-02-04 PROCEDURE — 95700 EEG CONT REC W/VID EEG TECH: CPT

## 2024-02-04 PROCEDURE — 63600175 PHARM REV CODE 636 W HCPCS: Performed by: NURSE PRACTITIONER

## 2024-02-04 PROCEDURE — 94761 N-INVAS EAR/PLS OXIMETRY MLT: CPT

## 2024-02-04 PROCEDURE — 82330 ASSAY OF CALCIUM: CPT | Mod: 91 | Performed by: STUDENT IN AN ORGANIZED HEALTH CARE EDUCATION/TRAINING PROGRAM

## 2024-02-04 PROCEDURE — 99291 CRITICAL CARE FIRST HOUR: CPT | Mod: 24,,, | Performed by: STUDENT IN AN ORGANIZED HEALTH CARE EDUCATION/TRAINING PROGRAM

## 2024-02-04 PROCEDURE — 63600175 PHARM REV CODE 636 W HCPCS: Performed by: HOSPITALIST

## 2024-02-04 PROCEDURE — 83735 ASSAY OF MAGNESIUM: CPT | Performed by: STUDENT IN AN ORGANIZED HEALTH CARE EDUCATION/TRAINING PROGRAM

## 2024-02-04 PROCEDURE — 99233 SBSQ HOSP IP/OBS HIGH 50: CPT | Mod: ,,, | Performed by: INTERNAL MEDICINE

## 2024-02-04 PROCEDURE — 94003 VENT MGMT INPAT SUBQ DAY: CPT

## 2024-02-04 PROCEDURE — 95720 EEG PHY/QHP EA INCR W/VEEG: CPT | Mod: ,,, | Performed by: PSYCHIATRY & NEUROLOGY

## 2024-02-04 PROCEDURE — 99900035 HC TECH TIME PER 15 MIN (STAT)

## 2024-02-04 PROCEDURE — 90945 DIALYSIS ONE EVALUATION: CPT

## 2024-02-04 PROCEDURE — 20000000 HC ICU ROOM

## 2024-02-04 PROCEDURE — 25000003 PHARM REV CODE 250

## 2024-02-04 PROCEDURE — 63600175 PHARM REV CODE 636 W HCPCS: Mod: JZ,JG | Performed by: INTERNAL MEDICINE

## 2024-02-04 PROCEDURE — 95714 VEEG EA 12-26 HR UNMNTR: CPT

## 2024-02-04 PROCEDURE — 85027 COMPLETE CBC AUTOMATED: CPT | Performed by: STUDENT IN AN ORGANIZED HEALTH CARE EDUCATION/TRAINING PROGRAM

## 2024-02-04 PROCEDURE — 63600175 PHARM REV CODE 636 W HCPCS: Mod: JZ,JG | Performed by: STUDENT IN AN ORGANIZED HEALTH CARE EDUCATION/TRAINING PROGRAM

## 2024-02-04 PROCEDURE — 25000003 PHARM REV CODE 250: Performed by: STUDENT IN AN ORGANIZED HEALTH CARE EDUCATION/TRAINING PROGRAM

## 2024-02-04 RX ORDER — INSULIN ASPART 100 [IU]/ML
12 INJECTION, SOLUTION INTRAVENOUS; SUBCUTANEOUS EVERY 4 HOURS
Status: DISCONTINUED | OUTPATIENT
Start: 2024-02-04 | End: 2024-02-05

## 2024-02-04 RX ADMIN — INSULIN ASPART 4 UNITS: 100 INJECTION, SOLUTION INTRAVENOUS; SUBCUTANEOUS at 04:02

## 2024-02-04 RX ADMIN — INSULIN ASPART 12 UNITS: 100 INJECTION, SOLUTION INTRAVENOUS; SUBCUTANEOUS at 12:02

## 2024-02-04 RX ADMIN — INSULIN ASPART 10 UNITS: 100 INJECTION, SOLUTION INTRAVENOUS; SUBCUTANEOUS at 07:02

## 2024-02-04 RX ADMIN — ACETAMINOPHEN 650 MG: 325 TABLET ORAL at 05:02

## 2024-02-04 RX ADMIN — HYDROMORPHONE HYDROCHLORIDE 0.5 MG: 0.5 INJECTION, SOLUTION INTRAMUSCULAR; INTRAVENOUS; SUBCUTANEOUS at 08:02

## 2024-02-04 RX ADMIN — CHLORHEXIDINE GLUCONATE 0.12% ORAL RINSE 15 ML: 1.2 LIQUID ORAL at 08:02

## 2024-02-04 RX ADMIN — FAMOTIDINE 20 MG: 10 INJECTION, SOLUTION INTRAVENOUS at 09:02

## 2024-02-04 RX ADMIN — METRONIDAZOLE 500 MG: 5 INJECTION, SOLUTION INTRAVENOUS at 11:02

## 2024-02-04 RX ADMIN — METRONIDAZOLE 500 MG: 5 INJECTION, SOLUTION INTRAVENOUS at 01:02

## 2024-02-04 RX ADMIN — HYDROMORPHONE HYDROCHLORIDE 0.5 MG: 0.5 INJECTION, SOLUTION INTRAMUSCULAR; INTRAVENOUS; SUBCUTANEOUS at 06:02

## 2024-02-04 RX ADMIN — HYDRALAZINE HYDROCHLORIDE 10 MG: 20 INJECTION, SOLUTION INTRAMUSCULAR; INTRAVENOUS at 02:02

## 2024-02-04 RX ADMIN — HEPARIN SODIUM 7500 UNITS: 5000 INJECTION INTRAVENOUS; SUBCUTANEOUS at 03:02

## 2024-02-04 RX ADMIN — INSULIN ASPART 6 UNITS: 100 INJECTION, SOLUTION INTRAVENOUS; SUBCUTANEOUS at 12:02

## 2024-02-04 RX ADMIN — INSULIN ASPART 4 UNITS: 100 INJECTION, SOLUTION INTRAVENOUS; SUBCUTANEOUS at 12:02

## 2024-02-04 RX ADMIN — HYDROMORPHONE HYDROCHLORIDE 0.5 MG: 0.5 INJECTION, SOLUTION INTRAMUSCULAR; INTRAVENOUS; SUBCUTANEOUS at 03:02

## 2024-02-04 RX ADMIN — DEXTROSE MONOHYDRATE, SODIUM CITRATE, AND CITRIC ACID MONOHYDRATE: 2.45; 2.2; .8 INJECTION, SOLUTION INTRAVENOUS at 05:02

## 2024-02-04 RX ADMIN — CEFTRIAXONE 2 G: 2 INJECTION, POWDER, FOR SOLUTION INTRAMUSCULAR; INTRAVENOUS at 09:02

## 2024-02-04 RX ADMIN — AMLODIPINE BESYLATE 5 MG: 5 TABLET ORAL at 09:02

## 2024-02-04 RX ADMIN — INSULIN ASPART 2 UNITS: 100 INJECTION, SOLUTION INTRAVENOUS; SUBCUTANEOUS at 08:02

## 2024-02-04 RX ADMIN — ALTEPLASE 2 MG: 2.2 INJECTION, POWDER, LYOPHILIZED, FOR SOLUTION INTRAVENOUS at 12:02

## 2024-02-04 RX ADMIN — METRONIDAZOLE 500 MG: 5 INJECTION, SOLUTION INTRAVENOUS at 06:02

## 2024-02-04 RX ADMIN — POLYETHYLENE GLYCOL 3350 17 G: 17 POWDER, FOR SOLUTION ORAL at 09:02

## 2024-02-04 RX ADMIN — HYDRALAZINE HYDROCHLORIDE 10 MG: 20 INJECTION, SOLUTION INTRAMUSCULAR; INTRAVENOUS at 07:02

## 2024-02-04 RX ADMIN — INSULIN ASPART 4 UNITS: 100 INJECTION, SOLUTION INTRAVENOUS; SUBCUTANEOUS at 07:02

## 2024-02-04 RX ADMIN — INSULIN ASPART 10 UNITS: 100 INJECTION, SOLUTION INTRAVENOUS; SUBCUTANEOUS at 12:02

## 2024-02-04 RX ADMIN — INSULIN ASPART 4 UNITS: 100 INJECTION, SOLUTION INTRAVENOUS; SUBCUTANEOUS at 03:02

## 2024-02-04 RX ADMIN — ACETAMINOPHEN 650 MG: 325 TABLET ORAL at 12:02

## 2024-02-04 RX ADMIN — ACETAMINOPHEN 650 MG: 325 TABLET ORAL at 06:02

## 2024-02-04 RX ADMIN — INSULIN ASPART 10 UNITS: 100 INJECTION, SOLUTION INTRAVENOUS; SUBCUTANEOUS at 04:02

## 2024-02-04 RX ADMIN — HYDROMORPHONE HYDROCHLORIDE 0.5 MG: 0.5 INJECTION, SOLUTION INTRAMUSCULAR; INTRAVENOUS; SUBCUTANEOUS at 07:02

## 2024-02-04 RX ADMIN — HEPARIN SODIUM 7500 UNITS: 5000 INJECTION INTRAVENOUS; SUBCUTANEOUS at 05:02

## 2024-02-04 RX ADMIN — INSULIN ASPART 12 UNITS: 100 INJECTION, SOLUTION INTRAVENOUS; SUBCUTANEOUS at 03:02

## 2024-02-04 RX ADMIN — HEPARIN SODIUM 7500 UNITS: 5000 INJECTION INTRAVENOUS; SUBCUTANEOUS at 10:02

## 2024-02-04 RX ADMIN — INSULIN ASPART 12 UNITS: 100 INJECTION, SOLUTION INTRAVENOUS; SUBCUTANEOUS at 08:02

## 2024-02-04 RX ADMIN — CALCIUM GLUCONATE: 98 INJECTION, SOLUTION INTRAVENOUS at 05:02

## 2024-02-04 RX ADMIN — CHLORHEXIDINE GLUCONATE 0.12% ORAL RINSE 15 ML: 1.2 LIQUID ORAL at 09:02

## 2024-02-04 NOTE — SUBJECTIVE & OBJECTIVE
"Interval HPI:   Overnight events: Remains in SICU. Intubated. POD 2. BG remains slightly above goal ranges but has trended down significantly since initiation of IV insulin infusion yesterday evening. Remains on scheduled novolog with tube feeding. CRRT. Diet NPO    Eating:   NPO  Nausea: No  Hypoglycemia and intervention: No  Fever: No  TPN and/or TF: Yes  If yes, type of TF/TPN and rate: Tube feeds at 30 ml/hr     BP (!) 147/66   Pulse 98   Temp 98.2 °F (36.8 °C) (Oral)   Resp (!) 24   Ht 5' 6" (1.676 m)   Wt (!) 165 kg (363 lb 12.1 oz)   LMP 01/01/2024 (Approximate) Comment: tubal ligation  SpO2 99%   BMI 58.71 kg/m²     Labs Reviewed and Include    Recent Labs   Lab 02/04/24 0317   *   CALCIUM 7.7*   ALBUMIN 1.5*      K 3.6   CO2 20*      BUN 54*   CREATININE 5.2*     Lab Results   Component Value Date    WBC 25.10 (H) 02/04/2024    HGB 7.9 (L) 02/04/2024    HCT 22.7 (L) 02/04/2024    MCV 76 (L) 02/04/2024     02/04/2024     No results for input(s): "TSH", "FREET4" in the last 168 hours.  Lab Results   Component Value Date    HGBA1C 10.4 (H) 01/30/2024       Nutritional status:   Body mass index is 58.71 kg/m².  Lab Results   Component Value Date    ALBUMIN 1.5 (L) 02/04/2024    ALBUMIN 1.5 (L) 02/03/2024    ALBUMIN 1.5 (L) 02/03/2024     No results found for: "PREALBUMIN"    Estimated Creatinine Clearance: 20.1 mL/min (A) (based on SCr of 5.2 mg/dL (H)).    Accu-Checks  Recent Labs     02/02/24 2037 02/03/24  0039 02/03/24  0409 02/03/24  0818 02/03/24  1159 02/03/24  1550 02/03/24 2014 02/04/24  0025 02/04/24  0425 02/04/24  0756   POCTGLUCOSE 316* 336* 386* 327* 343* 489* 376* 318* 311* 228*       Current Medications and/or Treatments Impacting Glycemic Control  Immunotherapy:    Immunosuppressants       None          Steroids:   Hormones (From admission, onward)      Start     Stop Route Frequency Ordered    01/30/24 0114  melatonin tablet 6 mg         -- Oral Nightly PRN " 01/30/24 0016          Pressors:    Autonomic Drugs (From admission, onward)      None          Hyperglycemia/Diabetes Medications:   Antihyperglycemics (From admission, onward)      Start     Stop Route Frequency Ordered    02/04/24 0000  insulin aspart U-100 pen 10 Units         -- SubQ Every 4 hours 02/03/24 1903    02/03/24 1915  insulin regular in 0.9 % NaCl 100 unit/100 mL (1 unit/mL) infusion        Question:  Enter initial dose (Units/hr):  Answer:  1.8    -- IV Continuous 02/03/24 1812    02/03/24 1010  insulin aspart U-100 pen 0-10 Units         -- SubQ Every 4 hours PRN 02/03/24 0911

## 2024-02-04 NOTE — PROGRESS NOTES
[facility]  General Surgery  Progress Note    Subjective:     History of Present Illness:  53 year old morbidly obese female who does not routinely go to the doctor presents to the ED with malaise, nausea, vomiting, and groin, buttock, perineal swelling and tenderness. She began feeling ill a few days ago.     On arrival to the ED she is tachycardic to 120, hypertensive without fever. Labs demonstrate leukocytosis of 21, , with lactic acidosis and associated ALVARADO with cr 3.8, hyperglycemia with blood glucose of 824 accompanied by severe electrolyte derangements. CT imaging obtained demonstrates diffuse subcutaneous air along buttocks, perineum, anterior vulva, as well as bilateral thighs.     No prior abdominal surgeries. She denies problems with her heart or lungs. Non smoker. Does not routinely take any medications.    Post-Op Info:  Procedure(s) (LRB):  DEBRIDEMENT, WOUND (Bilateral)   2 Days Post-Op     Interval History: Pt seen and examined at bedside.  On mechanical ventilation and CRRT.     Medications:  Continuous Infusions:   sodium chloride 0.9% Stopped (02/04/24 0001)    calcium gluconate 3 g in dextrose 5 % (D5W) 100 mL infusion 10 mL/hr at 02/04/24 0700    dextrose 10 % in water (D10W)      dextrose-sod citrate-citric ac 150 mL/hr at 02/04/24 0700    insulin regular 1 units/mL infusion orderable (TRANSFER) 1.8 Units/hr (02/04/24 0700)     Scheduled Meds:   sodium chloride 0.9%   Intravenous Once    sodium chloride 0.9%   Intravenous Once    acetaminophen  650 mg Per NG tube Q6H    amLODIPine  5 mg Oral Daily    cefTRIAXone (Rocephin) IV (PEDS and ADULTS)  2 g Intravenous Q24H    chlorhexidine  15 mL Mouth/Throat BID    famotidine (PF)  20 mg Intravenous Daily    heparin (porcine)  7,500 Units Subcutaneous Q8H    insulin aspart U-100  10 Units Subcutaneous Q4H    metronidazole  500 mg Intravenous Q8H    polyethylene glycol  17 g Per NG tube Daily     PRN Meds:sodium chloride  0.9%, sodium chloride 0.9%, albuterol-ipratropium, dextrose 10 % in water (D10W), dextrose 10%, dextrose 10%, dextrose 10%, dextrose 10%, glucagon (human recombinant), glucagon (human recombinant), glucose, glucose, hydrALAZINE, HYDROmorphone, insulin aspart U-100, labetalol, magnesium sulfate IVPB, melatonin, naloxone, ondansetron, oxyCODONE, potassium chloride **AND** potassium chloride **AND** potassium chloride, prochlorperazine, sodium chloride 0.9%, sodium chloride 0.9%, sodium chloride 0.9%, sodium phosphate 20.01 mmol in dextrose 5 % (D5W) 250 mL IVPB, sodium phosphate 30 mmol in dextrose 5 % (D5W) 250 mL IVPB, sodium phosphate 39.99 mmol in dextrose 5 % (D5W) 250 mL IVPB     Review of patient's allergies indicates:  No Known Allergies  Objective:     Vital Signs (Most Recent):  Temp: 98.2 °F (36.8 °C) (02/04/24 0715)  Pulse: 94 (02/04/24 0800)  Resp: 16 (02/04/24 0735)  BP: (!) 179/79 (02/04/24 0700)  SpO2: 98 % (02/04/24 0800) Vital Signs (24h Range):  Temp:  [98 °F (36.7 °C)-98.4 °F (36.9 °C)] 98.2 °F (36.8 °C)  Pulse:  [87-95] 94  Resp:  [0-26] 16  SpO2:  [97 %-100 %] 98 %  BP: (145-185)/(65-82) 179/79  Arterial Line BP: (0-228)/(0-97) 210/97     Weight: (!) 165 kg (363 lb 12.1 oz)  Body mass index is 58.71 kg/m².    Intake/Output - Last 3 Shifts         02/02 0700  02/03 0659 02/03 0700 02/04 0659 02/04 0700  02/05 0659    I.V. (mL/kg) 0 (0) 1202.9 (7.3) 11.8 (0.1)    Other       NG/ 600     IV Piggyback 330.7 503.1 149.9    Total Intake(mL/kg) 540.7 (3.3) 2306 (14) 161.7 (1)    Urine (mL/kg/hr) 20 (0) 50 (0)     Drains 525 55     Other 50 1566 704    Stool 0      Total Output 595 1671 704    Net -54.3 +635 -542.3           Stool Occurrence 1 x              Physical Exam  Vitals reviewed.   Constitutional:       General: She is not in acute distress.     Appearance: She is obese. She is ill-appearing.   HENT:      Head: Normocephalic.   Neck:      Comments: Right IJ trialysis   Cardiovascular:       Rate and Rhythm: Regular rhythm. Tachycardia present.      Pulses: Normal pulses.   Pulmonary:      Effort: Pulmonary effort is normal.      Comments: Mechanically ventilated   Abdominal:      Palpations: Abdomen is soft.      Tenderness: There is no abdominal tenderness.   Genitourinary:     Comments: Glynn in place with clear yellow urine   Musculoskeletal:      Comments: Dressings C/D/I. Surrounding skin soft edematous, no crepitus    Skin:     General: Skin is warm and dry.   Neurological:      General: No focal deficit present.          Significant Labs:  I have reviewed all pertinent lab results within the past 24 hours.  CBC:   Recent Labs   Lab 02/04/24  0317   WBC 25.10*   RBC 3.00*   HGB 7.9*   HCT 22.7*      MCV 76*   MCH 26.3*   MCHC 34.8       CMP:   Recent Labs   Lab 02/03/24  0327 02/03/24  2240 02/04/24 0317   *   < > 312*   CALCIUM 8.1*   < > 7.7*   ALBUMIN 1.5*   < > 1.5*   PROT 6.6  --   --       < > 136   K 4.1   < > 3.6   CO2 22*   < > 20*   CL 98   < > 102   BUN 53*   < > 54*   CREATININE 6.2*   < > 5.2*   ALKPHOS 134  --   --    ALT 22  --   --    AST 33  --   --    BILITOT 0.3  --   --     < > = values in this interval not displayed.         Significant Diagnostics:  I have reviewed all pertinent imaging results/findings within the past 24 hours.  Assessment/Plan:     * Ayaz's gangrene  53 y.o. 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       - To OR next week for wound vac change  - Daily SBTs  - Okay for DVT ppx   - Remainder of care per SICU        Robinson Rousseau MD  General Surgery  [facility]

## 2024-02-04 NOTE — PROGRESS NOTES
02/03/24 1841   Treatment   Treatment Type SLED   Treatment Status New start   Dialysis Machine Number [id number]   Solutions Labeled and Current  Yes   Access Temporary Cath;Right;IJ   Catheter Dressing Intact  Yes   Alarms Engaged Yes   CRRT Comments CRRT started without issue

## 2024-02-04 NOTE — ASSESSMENT & PLAN NOTE
Transfer from [facility]. Admitted for fourniers gangrene. Initiated HD on 1/31. ALVARADO 2/2 ATN in setting of septic shock. Arrived with CR 3.8. Unknown baseline.  Plan to OR today. Net -1.3L.OR today for debridement with possible closure and wound vac placement     ALVARADO most likley ATN in setting of septic shock   Last HD 2/1/24.    Plan/Recommendation  -RRT to end today at 1100; hold RRT and reassess as needed  -Will eval RRT needs daily   -Daily RFP   -Strict I&Os  -Avoid nephrotoxins, if possible

## 2024-02-04 NOTE — PROGRESS NOTES
SLED restarted with citrate and calcium  Clots pulled from arterial and venous lumens during aspiration; both lumens now with no flow issues.  Pt to complete SLED tx in 6H   UFR set to 300 with UF goal of 400     02/04/24 0515   Treatment   Treatment Type SLED   Treatment Status Restart   Dialysis Machine Number [id number]   Dialyzer Time (hours) 0   BVP (Liters) 0 L   Solutions Labeled and Current  Yes   Access Temporary Cath;Right;IJ   Catheter Dressing Intact  Yes   Alarms Engaged Yes   CRRT Comments SLED restarted

## 2024-02-04 NOTE — PROGRESS NOTES
[facility]  Critical Care - Surgery  Progress Note    Patient Name: Sarah Saravia  MRN: [id number]  Admission Date: 1/29/2024  Hospital Length of Stay: 6 days  Code Status: Full Code  Attending Provider: Steve Cole MD  Primary Care Provider: Patricia [facility] [place], [place]   Principal Problem: Ayaz's gangrene    Subjective:     Hospital/ICU Course:  No notes on file    Interval History/Significant Events: Transitioned to insulin gtt overnight, on 1.8 this am. 24 hr EEG started at 9 pm last night, will need MRI w/o contrast following EEG. Clotted SLED but was able to restart, 6 hrs left of SLED. Continued HTN to 180s systolic.    Follow-up For: Procedure(s) (LRB):  DEBRIDEMENT, WOUND (Bilateral)    Post-Operative Day: 2 Days Post-Op    Objective:     Vital Signs (Most Recent):  Temp: 98.2 °F (36.8 °C) (02/04/24 0300)  Pulse: 90 (02/04/24 0545)  Resp: (!) 24 (02/04/24 0530)  BP: (!) 168/70 (02/04/24 0500)  SpO2: 100 % (02/04/24 0545) Vital Signs (24h Range):  Temp:  [98 °F (36.7 °C)-98.4 °F (36.9 °C)] 98.2 °F (36.8 °C)  Pulse:  [86-95] 90  Resp:  [0-26] 24  SpO2:  [97 %-100 %] 100 %  BP: (135-185)/(63-82) 168/70  Arterial Line BP: (0-228)/(0-73) 168/73     Weight: (!) 164.5 kg (362 lb 10.5 oz)  Body mass index is 58.53 kg/m².      Intake/Output Summary (Last 24 hours) at 2/4/2024 0606  Last data filed at 2/4/2024 0515  Gross per 24 hour   Intake 2187.68 ml   Output 1440 ml   Net 747.68 ml          Physical Exam  Vitals reviewed.   Constitutional:       General: She is not in acute distress.     Appearance: She is obese. She is ill-appearing.   HENT:      Head: Normocephalic and atraumatic.      Comments: EEG leads in place     Nose: Nose normal.      Mouth/Throat:      Comments: intubated  Eyes:      Conjunctiva/sclera: Conjunctivae normal.      Comments: Brisk corneal and pupillary responses   Cardiovascular:      Rate and Rhythm: Normal rate.      Pulses: Normal pulses.    Pulmonary:      Effort: No respiratory distress.      Comments: Intubated   Abdominal:      General: Abdomen is flat. Bowel sounds are normal. There is no distension.      Palpations: Abdomen is soft.      Tenderness: There is no abdominal tenderness.   Musculoskeletal:         General: Normal range of motion.      Cervical back: Normal range of motion and neck supple.   Skin:     Capillary Refill: Capillary refill takes less than 2 seconds.      Comments: Rt groin with wound vac in place   Neurological:      General: No focal deficit present.      Comments: Withdraw to pain, eye opening to pain   Psychiatric:         Mood and Affect: Mood normal.            Vents:  Vent Mode: A/C (02/04/24 0356)  Set Rate: 18 BPM (02/04/24 0356)  Vt Set: 420 mL (02/04/24 0356)  PEEP/CPAP: 5 cmH20 (02/04/24 0356)  Oxygen Concentration (%): 40 (02/04/24 0545)  Peak Airway Pressure: 36 cmH20 (02/04/24 0356)  Plateau Pressure: 20 cmH20 (02/04/24 0356)  Total Ve: 9.11 L/m (02/04/24 0356)  Negative Inspiratory Force (cm H2O): 0 (02/04/24 0356)  F/VT Ratio<105 (RSBI): (!) 40.54 (02/03/24 1642)    Lines/Drains/Airways       Central Venous Catheter Line  Duration             Trialysis (Dialysis) Catheter 01/31/24 1051 right internal jugular 3 days              Drain  Duration                  Urethral Catheter 01/29/24 2315 5 days         NG/OG Tube 02/01/24 2250 18 Fr. Center mouth 2 days              Airway  Duration                  Airway - Non-Surgical 01/31/24 1020 3 days              Arterial Line  Duration             Arterial Line 01/31/24 1025 Right Radial 3 days              Peripheral Intravenous Line  Duration                  Peripheral IV - Single Lumen 01/29/24 2116 Left Antecubital 5 days                    Significant Labs:    CBC/Anemia Profile:  Recent Labs   Lab 02/03/24  0327 02/04/24  0317   WBC 27.03* 25.10*   HGB 8.5* 7.9*   HCT 24.1* 22.7*    205   MCV 76* 76*   RDW 14.5 14.6*        Chemistries:  Recent  Labs   Lab 02/03/24 0327 02/03/24 2240 02/04/24 0317    136 136   K 4.1 3.8 3.6   CL 98 104 102   CO2 22* 22* 20*   BUN 53* 50* 54*   CREATININE 6.2* 5.2* 5.2*   CALCIUM 8.1* 7.5* 7.7*   ALBUMIN 1.5* 1.5* 1.5*   PROT 6.6  --   --    BILITOT 0.3  --   --    ALKPHOS 134  --   --    ALT 22  --   --    AST 33  --   --    MG 2.3 2.1 2.2   PHOS 6.3* 4.0 3.9  3.9       Significant Imaging:  I have reviewed all pertinent imaging results/findings within the past 24 hours.  Assessment/Plan:     Renal/  * Ayaz's gangrene    Neuro/Psych:   -- Sedation: None  -- Pain: kermit tylenol, dialudid and oxy prn  -- GCS 7T: E2, V1T, M4; exam stable  -- CTH 2/3 with scattered hypoattenuation likely representing age indeterminate infarcts   -- Neurology following; appreciate recs  -- Fu EEG read  -- Fu Neuro if still recommending MRI w/o contrast             Cards:   -- HDS  -- goal SBP < 180, MAP >65  -- hypertensive, hydralazine and labetalol prns  -- Started amlodipine 5mg, will increase to 10mg tomorrow if still requiring multiple prns      Pulm:   #Acute Respiratory Failure  -- Intubated  -- CXR stable  -- Goal O2 sat > 90%  -- Daily SBT      Renal:  #ALVARADO on CKD  Received HD 2/1. ATN.   -- Nephrology following; appreciate recs  -- Nocturnal SLED; pulled off 2L UFso far  -- Net +750cc in last 24 hrs  -- Keep meza for strict I/O  -- Urine output/24h: 50cc  Recent Labs   Lab 02/03/24 0327 02/03/24 2240 02/04/24 0317   BUN 53* 50* 54*   CREATININE 6.2* 5.2* 5.2*      FEN / GI:   -- Daily CMPs  -- NGT in place   -- Replace lytes as needed  -- Nutrition: NPO, TF (Novasource Renal) at goal 30 ml/hr  -- GI PPX   -- Nutrition following; appreciate recs      ID:    #Ayaz's gangrene  s/p OR debridement for nec fascitis. R groin tissue with proteus, sensitive to ceftriaxone  -- Abx: ceftriaxone and flagyl  -- ID following ; appreciate recs  -- Debridement and wound vac change planned for 2/6  Recent Labs   Lab 02/02/24  4672  02/03/24 0327 02/04/24 0317   WBC 22.87* 27.03* 25.10*      Heme/Onc:  -- Daily CBC  -- Heparin DVT ppx  Recent Labs   Lab 02/01/24  2246 02/02/24  0335 02/03/24 0327 02/04/24 0317   HGB  --  8.8* 8.5* 7.9*   PLT  --  216 241 205   APTT 27.8  --   --   --    INR 1.0  --   --   --       Endo:   #IDDM  Initially presented to OSH with DKA with latest A1C 10.4 AG Gap resolved  - Increasing insulin prn requirements since starting tube feeds  - Placed on insulin gtt 2/3: Transition IV insulin infusion at 1.8 u/hr with stepdown parameters (0.5 u/kg dosing)   - Increased Novolog to 10 units units q 4 hrs while on Tube feeds (HOLD if tube feeds are stopped or BG < 100)  - Moderate dose SSI  - Endocrine following, appreciate recs        PPx:   Feeding: NPO, TF @ 30  Analgesia/Sedation: See above  Thromboembolic prevention: H   HOB >30: Y  Stress Ulcer ppx: Famotidine  Glucose control: Critical care goal 140-180 g/dl, Insulin gtt, kermit novolog, SSI, Endo following  Bowel reg: N/A  Invasive Lines/Drains/Airway: Glynn, ETT, Art line, Trialysis, PIV x2      Dispo/Code Status/Palliative:   -- SICU / Full Code          Wilian Soria MD  Critical Care - Surgery  [facility] - Surgical Intensive Care

## 2024-02-04 NOTE — PROGRESS NOTES
Pt clotted system for the second time.   Dr. Stokes notified (0001, 0050, 0148, 0150); ordered to hold off on restarting pt and he will call back with new orders.      02/04/24 0000   Treatment   Treatment Status Blood returned   Dialyzer Time (hours) 2.05   BVP (Liters) 16.7 L   CRRT Comments Venous chamber clotted; dialyzer heavily streaked; pt rinsed back by ICU RN

## 2024-02-04 NOTE — SUBJECTIVE & OBJECTIVE
Interval History: clotted dialysis overnight. Started citrate    Review of patient's allergies indicates:  No Known Allergies  Current Facility-Administered Medications   Medication Frequency    0.9%  NaCl infusion (CRRT USE ONLY) Continuous    0.9%  NaCl infusion PRN    0.9%  NaCl infusion Once    0.9%  NaCl infusion PRN    0.9%  NaCl infusion Once    acetaminophen tablet 650 mg Q6H    albuterol-ipratropium 2.5 mg-0.5 mg/3 mL nebulizer solution 3 mL Q4H PRN    amLODIPine tablet 5 mg Daily    calcium gluconate 3 g in dextrose 5 % (D5W) 100 mL infusion Continuous    cefTRIAXone (ROCEPHIN) 2 g in dextrose 5 % in water (D5W) 100 mL IVPB (MB+) Q24H    chlorhexidine 0.12 % solution 15 mL BID    dextrose 10 % infusion Continuous PRN    dextrose 10% bolus 125 mL 125 mL PRN    dextrose 10% bolus 125 mL 125 mL PRN    dextrose 10% bolus 250 mL 250 mL PRN    dextrose 10% bolus 250 mL 250 mL PRN    dextrose-sod citrate-citric ac 2.45-2.2 gram- 800 mg/100 mL Soln Continuous    famotidine (PF) injection 20 mg Daily    glucagon (human recombinant) injection 1 mg PRN    glucagon (human recombinant) injection 1 mg PRN    glucose chewable tablet 16 g PRN    glucose chewable tablet 24 g PRN    heparin (porcine) injection 7,500 Units Q8H    hydrALAZINE injection 10 mg Q4H PRN    HYDROmorphone injection 0.5 mg Q1H PRN    insulin aspart U-100 pen 0-10 Units Q4H PRN    insulin aspart U-100 pen 10 Units Q4H    insulin regular in 0.9 % NaCl 100 unit/100 mL (1 unit/mL) infusion Continuous    labetalol 20 mg/4 mL (5 mg/mL) IV syring Q6H PRN    magnesium sulfate 2g in water 50mL IVPB (premix) PRN    melatonin tablet 6 mg Nightly PRN    metronidazole IVPB 500 mg Q8H    naloxone 0.4 mg/mL injection 0.02 mg PRN    ondansetron injection 4 mg Q6H PRN    oxyCODONE immediate release tablet 5 mg Q6H PRN    polyethylene glycol packet 17 g Daily    potassium chloride 10 mEq in 100 mL IVPB PRN    And    potassium chloride 10 mEq in 100 mL IVPB PRN    And     potassium chloride 10 mEq in 100 mL IVPB PRN    prochlorperazine injection Soln 5 mg Q6H PRN    sodium chloride 0.9% bolus 250 mL 250 mL PRN    sodium chloride 0.9% bolus 250 mL 250 mL PRN    sodium chloride 0.9% flush 10 mL PRN    sodium phosphate 20.01 mmol in dextrose 5 % (D5W) 250 mL IVPB PRN    sodium phosphate 30 mmol in dextrose 5 % (D5W) 250 mL IVPB PRN    sodium phosphate 39.99 mmol in dextrose 5 % (D5W) 250 mL IVPB PRN     Facility-Administered Medications Ordered in Other Encounters   Medication Frequency    propofol (DIPRIVAN) 10 mg/mL infusion PRN       Objective:     Vital Signs (Most Recent):  Temp: 98.2 °F (36.8 °C) (02/04/24 0715)  Pulse: 94 (02/04/24 0800)  Resp: 16 (02/04/24 0735)  BP: (!) 179/79 (02/04/24 0700)  SpO2: 98 % (02/04/24 0800) Vital Signs (24h Range):  Temp:  [98 °F (36.7 °C)-98.4 °F (36.9 °C)] 98.2 °F (36.8 °C)  Pulse:  [87-95] 94  Resp:  [0-26] 16  SpO2:  [97 %-100 %] 98 %  BP: (145-185)/(65-82) 179/79  Arterial Line BP: (0-228)/(0-97) 210/97     Weight: (!) 165 kg (363 lb 12.1 oz) (02/04/24 0300)  Body mass index is 58.71 kg/m².  Body surface area is 2.77 meters squared.    I/O last 3 completed shifts:  In: 2776.8 [I.V.:1214.7; NG/GT:810; IV Piggyback:752.1]  Out: 2397 [Urine:70; Drains:230; Other:2097]     Physical Exam  Constitutional:       General: She is not in acute distress.     Appearance: She is obese. She is not ill-appearing or toxic-appearing.   Cardiovascular:      Rate and Rhythm: Normal rate.   Pulmonary:      Comments: Vent Mode: A/C  Oxygen Concentration (%):  [40] 40  Resp Rate Total:  [19 br/min-32 br/min] 22 br/min  Vt Set:  [420 mL] 420 mL  PEEP/CPAP:  [5 cmH20] 5 cmH20  Mean Airway Pressure:  [6.5 hnY56-75 cmH20] 11 cmH20      Abdominal:      General: There is distension.   Musculoskeletal:      Right lower leg: Edema present.      Left lower leg: Edema present.          Significant Labs:  All labs within the past 24 hours have been reviewed.      Significant Imaging:  Labs: Reviewed

## 2024-02-04 NOTE — NURSING
SICU PLAN OF CARE NOTE    Dx: Ayaz's gangrene    Goals of Care: Sys <180      Vital Signs (last 12 hours):   Temp:  [98 °F (36.7 °C)-98.4 °F (36.9 °C)]   Pulse:  [87-93]   Resp:  [0-19]   BP: (145-178)/(65-82)   SpO2:  [97 %-99 %]   Arterial Line BP: (140-177)/(53-71)      Neuro: arouses to pain    Cardiac: NSR    Respiratory: Ventilator 40% and 5 PEEP    Gtts: Insulin @ 1.8 units    Urine Output: Urinary Catheter 20 cc/shift    Dialysis: SLED, UF Rate: 350/hr    Drains: Wound Vac, total output 50 / shift    Diet: Tube Feeds @ 30     Labs/Accuchecks: Q4 accuchecks, Daily labs.    Skin:  All skin remains free from injury.  Patient turned Q2, mattress inflated, and bed working correctly.    Shift Events: Pt clotted off CRRT x2, restarted w/ citrate and calcium gluconate. Pt connected to EEG, see notes for details. No BM this shift. Plans to exchange wound vac Monday/ Tuesday. See flowsheet for further assessment/details.  Family updated on current condition/plan of care, questions answered, and emotional support provided.  MD updated on current condition, vitals, labs, and gtts.  No new orders received, will continue to monitor.

## 2024-02-04 NOTE — PROGRESS NOTES
"[facility]  Endocrinology  Progress Note    Admit Date: 1/29/2024     Reason for Consult: Management of T2DM, Hyperglycemia     Surgical Procedure and Date: n/a    Diabetes diagnosis year: new onset     Home Diabetes Medications:  none per chart review and family present at bedside     Lab Results   Component Value Date    HGBA1C 10.4 (H) 01/30/2024       Diabetes Complications include:     Hyperglycemia, DKA at OSH     Complicating diabetes co morbidities:   Obesity       HPI:   Patient is a 53 y.o. female with a diagnosis of obesity (BMI 53) admitted with N/V and R groin wound found to be in DKA at OSH. 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 While at OSH pt developed acute respiratory failure requiring intubation. Pulmonology was consulted for ventilator management and critical illness. Nephrology was consulted for worsening vishal in the setting of lactic acidosis and septic shock likely ATN, sCr elevated at 3.8 on admit now 4.0 post HD. Pt being admitted to SICU for surgical critical care management. Immediate plans include hemodynamic stabilization, weaning of respiratory support, and RRT.  Endocrinology consulted for management of T2DM.                 Interval HPI:   Overnight events: Remains in SICU. Intubated. POD 2. BG remains slightly above goal ranges but has trended down significantly since initiation of IV insulin infusion yesterday evening. Remains on scheduled novolog with tube feeding. CRRT. Diet NPO    Eating:   NPO  Nausea: No  Hypoglycemia and intervention: No  Fever: No  TPN and/or TF: Yes  If yes, type of TF/TPN and rate: Tube feeds at 30 ml/hr     BP (!) 147/66   Pulse 98   Temp 98.2 °F (36.8 °C) (Oral)   Resp (!) 24   Ht 5' 6" (1.676 m)   Wt (!) 165 kg (363 lb 12.1 oz)   LMP 01/01/2024 (Approximate) Comment: tubal ligation  SpO2 99%   BMI 58.71 kg/m²     Labs " "Reviewed and Include    Recent Labs   Lab 02/04/24  0317   *   CALCIUM 7.7*   ALBUMIN 1.5*      K 3.6   CO2 20*      BUN 54*   CREATININE 5.2*     Lab Results   Component Value Date    WBC 25.10 (H) 02/04/2024    HGB 7.9 (L) 02/04/2024    HCT 22.7 (L) 02/04/2024    MCV 76 (L) 02/04/2024     02/04/2024     No results for input(s): "TSH", "FREET4" in the last 168 hours.  Lab Results   Component Value Date    HGBA1C 10.4 (H) 01/30/2024       Nutritional status:   Body mass index is 58.71 kg/m².  Lab Results   Component Value Date    ALBUMIN 1.5 (L) 02/04/2024    ALBUMIN 1.5 (L) 02/03/2024    ALBUMIN 1.5 (L) 02/03/2024     No results found for: "PREALBUMIN"    Estimated Creatinine Clearance: 20.1 mL/min (A) (based on SCr of 5.2 mg/dL (H)).    Accu-Checks  Recent Labs     02/02/24  2037 02/03/24  0039 02/03/24  0409 02/03/24  0818 02/03/24  1159 02/03/24  1550 02/03/24 2014 02/04/24  0025 02/04/24  0425 02/04/24  0756   POCTGLUCOSE 316* 336* 386* 327* 343* 489* 376* 318* 311* 228*       Current Medications and/or Treatments Impacting Glycemic Control  Immunotherapy:    Immunosuppressants       None          Steroids:   Hormones (From admission, onward)      Start     Stop Route Frequency Ordered    01/30/24 0114  melatonin tablet 6 mg         -- Oral Nightly PRN 01/30/24 0016          Pressors:    Autonomic Drugs (From admission, onward)      None          Hyperglycemia/Diabetes Medications:   Antihyperglycemics (From admission, onward)      Start     Stop Route Frequency Ordered    02/04/24 0000  insulin aspart U-100 pen 10 Units         -- SubQ Every 4 hours 02/03/24 1903    02/03/24 1915  insulin regular in 0.9 % NaCl 100 unit/100 mL (1 unit/mL) infusion        Question:  Enter initial dose (Units/hr):  Answer:  1.8    -- IV Continuous 02/03/24 1812    02/03/24 1010  insulin aspart U-100 pen 0-10 Units         -- SubQ Every 4 hours PRN 02/03/24 0911            ASSESSMENT and " PLAN    Renal/  * Ayaz's gangrene  Managed per primary team  Optimize BG control        ALVARADO (acute kidney injury)  Lab Results   Component Value Date    CREATININE 5.2 (H) 02/04/2024     Avoid insulin stacking  Titrate insulin slowly       Endocrine  Morbid (severe) obesity due to excess calories  Body mass index is 58.71 kg/m².  May increase insulin resistance.         New onset type 2 diabetes mellitus  BG goal 140-180    No previous hx of T2DM per family at bedside. A1c of 10.4. DKA at OSH.     Plan:  -Continue Transition IV insulin infusion at 1.8 u/hr with stepdown parameters (0.5 u/kg dosing)   -Increase Novolog to 12 units q 4 hrs while on tube feeding (HOLD if tube feeding is stopped or BG < 100) 20% dose increase; still requiring frequent prn SQ insulin correction   -Continue Moderate Dose Correction Scale  -BG monitoring q 4 hrs while NPO     ** Please call Endocrine for any BG related issues **      Discharge plans: TBD    Lab Results   Component Value Date    HGBA1C 10.4 (H) 01/30/2024             Zoie Muñiz NP  Endocrinology  [facility]

## 2024-02-04 NOTE — PROGRESS NOTES
02/04/24 1058   Treatment   Treatment Type SLED   Treatment Status Discontinued treatment   Dialysis Machine Number [id number]   Dialyzer Time (hours) 4.8   BVP (Liters) 34.2 L   Catheter Dressing Intact  Yes   CRRT Comments Clotted, Blood returned by primary RN.     Machine disinfected at this time.

## 2024-02-04 NOTE — SUBJECTIVE & OBJECTIVE
Interval History: Pt seen and examined at bedside.  On mechanical ventilation and CRRT.     Medications:  Continuous Infusions:   sodium chloride 0.9% Stopped (02/04/24 0001)    calcium gluconate 3 g in dextrose 5 % (D5W) 100 mL infusion 10 mL/hr at 02/04/24 0700    dextrose 10 % in water (D10W)      dextrose-sod citrate-citric ac 150 mL/hr at 02/04/24 0700    insulin regular 1 units/mL infusion orderable (TRANSFER) 1.8 Units/hr (02/04/24 0700)     Scheduled Meds:   sodium chloride 0.9%   Intravenous Once    sodium chloride 0.9%   Intravenous Once    acetaminophen  650 mg Per NG tube Q6H    amLODIPine  5 mg Oral Daily    cefTRIAXone (Rocephin) IV (PEDS and ADULTS)  2 g Intravenous Q24H    chlorhexidine  15 mL Mouth/Throat BID    famotidine (PF)  20 mg Intravenous Daily    heparin (porcine)  7,500 Units Subcutaneous Q8H    insulin aspart U-100  10 Units Subcutaneous Q4H    metronidazole  500 mg Intravenous Q8H    polyethylene glycol  17 g Per NG tube Daily     PRN Meds:sodium chloride 0.9%, sodium chloride 0.9%, albuterol-ipratropium, dextrose 10 % in water (D10W), dextrose 10%, dextrose 10%, dextrose 10%, dextrose 10%, glucagon (human recombinant), glucagon (human recombinant), glucose, glucose, hydrALAZINE, HYDROmorphone, insulin aspart U-100, labetalol, magnesium sulfate IVPB, melatonin, naloxone, ondansetron, oxyCODONE, potassium chloride **AND** potassium chloride **AND** potassium chloride, prochlorperazine, sodium chloride 0.9%, sodium chloride 0.9%, sodium chloride 0.9%, sodium phosphate 20.01 mmol in dextrose 5 % (D5W) 250 mL IVPB, sodium phosphate 30 mmol in dextrose 5 % (D5W) 250 mL IVPB, sodium phosphate 39.99 mmol in dextrose 5 % (D5W) 250 mL IVPB     Review of patient's allergies indicates:  No Known Allergies  Objective:     Vital Signs (Most Recent):  Temp: 98.2 °F (36.8 °C) (02/04/24 0715)  Pulse: 94 (02/04/24 0800)  Resp: 16 (02/04/24 0735)  BP: (!) 179/79 (02/04/24 0700)  SpO2: 98 % (02/04/24 0800)  Vital Signs (24h Range):  Temp:  [98 °F (36.7 °C)-98.4 °F (36.9 °C)] 98.2 °F (36.8 °C)  Pulse:  [87-95] 94  Resp:  [0-26] 16  SpO2:  [97 %-100 %] 98 %  BP: (145-185)/(65-82) 179/79  Arterial Line BP: (0-228)/(0-97) 210/97     Weight: (!) 165 kg (363 lb 12.1 oz)  Body mass index is 58.71 kg/m².    Intake/Output - Last 3 Shifts         02/02 0700  02/03 0659 02/03 0700  02/04 0659 02/04 0700  02/05 0659    I.V. (mL/kg) 0 (0) 1202.9 (7.3) 11.8 (0.1)    Other       NG/ 600     IV Piggyback 330.7 503.1 149.9    Total Intake(mL/kg) 540.7 (3.3) 2306 (14) 161.7 (1)    Urine (mL/kg/hr) 20 (0) 50 (0)     Drains 525 55     Other 50 1566 704    Stool 0      Total Output 595 1671 704    Net -54.3 +635 -542.3           Stool Occurrence 1 x               Physical Exam  Vitals reviewed.   Constitutional:       General: She is not in acute distress.     Appearance: She is obese. She is ill-appearing.   HENT:      Head: Normocephalic.   Neck:      Comments: Right IJ trialysis   Cardiovascular:      Rate and Rhythm: Regular rhythm. Tachycardia present.      Pulses: Normal pulses.   Pulmonary:      Effort: Pulmonary effort is normal.      Comments: Mechanically ventilated   Abdominal:      Palpations: Abdomen is soft.      Tenderness: There is no abdominal tenderness.   Genitourinary:     Comments: Glynn in place with clear yellow urine   Musculoskeletal:      Comments: Dressings C/D/I. Surrounding skin soft edematous, no crepitus    Skin:     General: Skin is warm and dry.   Neurological:      General: No focal deficit present.          Significant Labs:  I have reviewed all pertinent lab results within the past 24 hours.  CBC:   Recent Labs   Lab 02/04/24  0317   WBC 25.10*   RBC 3.00*   HGB 7.9*   HCT 22.7*      MCV 76*   MCH 26.3*   MCHC 34.8       CMP:   Recent Labs   Lab 02/03/24  0327 02/03/24  2240 02/04/24 0317   *   < > 312*   CALCIUM 8.1*   < > 7.7*   ALBUMIN 1.5*   < > 1.5*   PROT 6.6  --   --        < > 136   K 4.1   < > 3.6   CO2 22*   < > 20*   CL 98   < > 102   BUN 53*   < > 54*   CREATININE 6.2*   < > 5.2*   ALKPHOS 134  --   --    ALT 22  --   --    AST 33  --   --    BILITOT 0.3  --   --     < > = values in this interval not displayed.         Significant Diagnostics:  I have reviewed all pertinent imaging results/findings within the past 24 hours.

## 2024-02-04 NOTE — PROGRESS NOTES
[facility]  Nephrology  Progress Note    Patient Name: Sarah Saravia  MRN: [id number]  Admission Date: 1/29/2024  Hospital Length of Stay: 6 days  Attending Provider: Steve Cole MD   Primary Care Physician: Patricia [facility]  Principal Problem:Ayaz's gangrene    Subjective:     HPI: Sarah Saravia is a 53 year old female with a past medical history of obesity (BMI 53) admitted with N/V and R groin wound found to be in DKA at OSH.  She underwent a CT abdomen and pelvis that showed extensive soft tissue air throughout the right groin and inguinal region and extending to involve the right lower quadrant anterior abdominal wall with extensive soft tissue air also seen involving the proximal medial aspect of the right thigh, concerning for gas-forming infection. She is s/p OR debridement for necrotizing fascitis on 1/29/24 and take back for 1/31/24. Right groin tissue growing proteus, susceptibilities still pending. Currently on meropenem and clindamycin which was d/c'd on 1/31/24. While at OSH pt developed acute respiratory failure requiring intubation. Nephrology was consulted for worsening alvarado in the setting of lactic acidosis and septic shock likely ATN, sCr elevated at 3.8 on admit.  OR today for debridement with possible closure and wound vac placement. Last HD 2/1. Net -1.3L. Electrolytes stable. Nephrology consulted for ALVARADO.     Interval History: clotted dialysis overnight. Started citrate    Review of patient's allergies indicates:  No Known Allergies  Current Facility-Administered Medications   Medication Frequency    0.9%  NaCl infusion (CRRT USE ONLY) Continuous    0.9%  NaCl infusion PRN    0.9%  NaCl infusion Once    0.9%  NaCl infusion PRN    0.9%  NaCl infusion Once    acetaminophen tablet 650 mg Q6H    albuterol-ipratropium 2.5 mg-0.5 mg/3 mL nebulizer solution 3 mL Q4H PRN    amLODIPine tablet 5 mg Daily    calcium gluconate 3 g in dextrose 5 %  (D5W) 100 mL infusion Continuous    cefTRIAXone (ROCEPHIN) 2 g in dextrose 5 % in water (D5W) 100 mL IVPB (MB+) Q24H    chlorhexidine 0.12 % solution 15 mL BID    dextrose 10 % infusion Continuous PRN    dextrose 10% bolus 125 mL 125 mL PRN    dextrose 10% bolus 125 mL 125 mL PRN    dextrose 10% bolus 250 mL 250 mL PRN    dextrose 10% bolus 250 mL 250 mL PRN    dextrose-sod citrate-citric ac 2.45-2.2 gram- 800 mg/100 mL Soln Continuous    famotidine (PF) injection 20 mg Daily    glucagon (human recombinant) injection 1 mg PRN    glucagon (human recombinant) injection 1 mg PRN    glucose chewable tablet 16 g PRN    glucose chewable tablet 24 g PRN    heparin (porcine) injection 7,500 Units Q8H    hydrALAZINE injection 10 mg Q4H PRN    HYDROmorphone injection 0.5 mg Q1H PRN    insulin aspart U-100 pen 0-10 Units Q4H PRN    insulin aspart U-100 pen 10 Units Q4H    insulin regular in 0.9 % NaCl 100 unit/100 mL (1 unit/mL) infusion Continuous    labetalol 20 mg/4 mL (5 mg/mL) IV syring Q6H PRN    magnesium sulfate 2g in water 50mL IVPB (premix) PRN    melatonin tablet 6 mg Nightly PRN    metronidazole IVPB 500 mg Q8H    naloxone 0.4 mg/mL injection 0.02 mg PRN    ondansetron injection 4 mg Q6H PRN    oxyCODONE immediate release tablet 5 mg Q6H PRN    polyethylene glycol packet 17 g Daily    potassium chloride 10 mEq in 100 mL IVPB PRN    And    potassium chloride 10 mEq in 100 mL IVPB PRN    And    potassium chloride 10 mEq in 100 mL IVPB PRN    prochlorperazine injection Soln 5 mg Q6H PRN    sodium chloride 0.9% bolus 250 mL 250 mL PRN    sodium chloride 0.9% bolus 250 mL 250 mL PRN    sodium chloride 0.9% flush 10 mL PRN    sodium phosphate 20.01 mmol in dextrose 5 % (D5W) 250 mL IVPB PRN    sodium phosphate 30 mmol in dextrose 5 % (D5W) 250 mL IVPB PRN    sodium phosphate 39.99 mmol in dextrose 5 % (D5W) 250 mL IVPB PRN     Facility-Administered Medications Ordered in Other Encounters   Medication Frequency     propofol (DIPRIVAN) 10 mg/mL infusion PRN       Objective:     Vital Signs (Most Recent):  Temp: 98.2 °F (36.8 °C) (02/04/24 0715)  Pulse: 94 (02/04/24 0800)  Resp: 16 (02/04/24 0735)  BP: (!) 179/79 (02/04/24 0700)  SpO2: 98 % (02/04/24 0800) Vital Signs (24h Range):  Temp:  [98 °F (36.7 °C)-98.4 °F (36.9 °C)] 98.2 °F (36.8 °C)  Pulse:  [87-95] 94  Resp:  [0-26] 16  SpO2:  [97 %-100 %] 98 %  BP: (145-185)/(65-82) 179/79  Arterial Line BP: (0-228)/(0-97) 210/97     Weight: (!) 165 kg (363 lb 12.1 oz) (02/04/24 0300)  Body mass index is 58.71 kg/m².  Body surface area is 2.77 meters squared.    I/O last 3 completed shifts:  In: 2776.8 [I.V.:1214.7; NG/GT:810; IV Piggyback:752.1]  Out: 2397 [Urine:70; Drains:230; Other:2097]     Physical Exam  Constitutional:       General: She is not in acute distress.     Appearance: She is obese. She is not ill-appearing or toxic-appearing.   Cardiovascular:      Rate and Rhythm: Normal rate.   Pulmonary:      Comments: Vent Mode: A/C  Oxygen Concentration (%):  [40] 40  Resp Rate Total:  [19 br/min-32 br/min] 22 br/min  Vt Set:  [420 mL] 420 mL  PEEP/CPAP:  [5 cmH20] 5 cmH20  Mean Airway Pressure:  [6.5 mdN47-68 cmH20] 11 cmH20      Abdominal:      General: There is distension.   Musculoskeletal:      Right lower leg: Edema present.      Left lower leg: Edema present.          Significant Labs:  All labs within the past 24 hours have been reviewed.     Significant Imaging:  Labs: Reviewed  Assessment/Plan:     Renal/  * Ayaz's gangrene  -management per primary     ALVARADO (acute kidney injury)  Transfer from [facility]. Admitted for fourniers gangrene. Initiated HD on 1/31. ALVARADO 2/2 ATN in setting of septic shock. Arrived with CR 3.8. Unknown baseline.  Plan to OR today. Net -1.3L.OR today for debridement with possible closure and wound vac placement     ALVARADO most likley ATN in setting of septic shock   Last HD 2/1/24.    Plan/Recommendation  -RRT to end today at 1100; hold RRT  and reassess as needed  -Repeat labs afternoon after dailysis  -Will eval RRT needs daily   -Daily RFP   -Strict I&Os  -Avoid nephrotoxins, if possible     ID  Severe sepsis  -        Thank you for your consult. I will follow-up with patient. Please contact us if you have any additional questions.    Enrique Stokes MD  Nephrology  [facility]    ATTENDING PHYSICIAN ATTESTATION  I have personally verified the history and examined the patient. I thoroughly reviewed the demographic, clinical, laboratorial and imaging information available in medical records. I agree with the assessment and recommendations provided by the subspecialty resident who was under my supervision.

## 2024-02-04 NOTE — ASSESSMENT & PLAN NOTE
BG goal 140-180    No previous hx of T2DM per family at bedside. A1c of 10.4. DKA at OSH.     Plan:  -Continue Transition IV insulin infusion at 1.8 u/hr with stepdown parameters (0.5 u/kg dosing)   -Increase Novolog to 12 units q 4 hrs while on tube feeding (HOLD if tube feeding is stopped or BG < 100) 20% dose increase; still requiring frequent prn SQ insulin correction   -Continue Moderate Dose Correction Scale  -BG monitoring q 4 hrs while NPO     ** Please call Endocrine for any BG related issues **      Discharge plans: TBD    Lab Results   Component Value Date    HGBA1C 10.4 (H) 01/30/2024

## 2024-02-04 NOTE — PROGRESS NOTES
[facility] - Surgical Intensive Care  Infectious Disease  Progress Note    Patient Name: Sarah Saravia  MRN: [id number]  Admission Date: 1/29/2024  Length of Stay: 6 days  Attending Physician: Steve Cole MD  [facility]    Isolation Status: No active isolations  Assessment/Plan:      Renal/  * Ayaz's gangrene  I have reviewed hospital notes from  SICU service and other specialty providers. I have also reviewed CBC, CMP/BMP,  cultures and imaging with my interpretation as documented.     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 Operative cultures showing P mirabilis. She is afebrile, and with ongoing leukocytosis.   Continue ceftriaxone 2 gm IV daily.  Continue metronidazole.   Will follow up culture results.  Surgical debridements as indicated for non viable tissue as per Surgical Service.  Discussed with the patients daughter and boyfriend at bedside.  Discussed management plan with the staff and/or members from SICU and General Surgery service.           Anticipated Disposition: per primary    Thank you for your consult. I will follow-up with patient. Please contact us if you have any additional questions.    Devika Andujar MD  Infectious Disease  [facility] - [facility]    50 minutes of total time spent on the encounter, which includes face to face time and non-face to face time preparing to see the patient (eg, review of tests), obtaining and/or reviewing separately obtained history, documenting clinical information in the electronic or other health record, independently interpreting results (not separately reported) and communicating results to the patient/family/caregiver, or care coordination (not separately reported).     Subjective:     Principal Problem:Ayaz's gangrene    HPI: A 53-year-old woman with morbid obesity as evidenced by a BMI of 50 3 (documented as 58 at [facility] [place] [place]) [facility]  a wound to her posterior thigh, vomiting, and diarrhea for 3-4 days prior to admission.  On evaluation in [facility] ED she was noted to be afebrile tachycardic, and hypertensive.  Laboratory workup at that time revealed leukocytosis, elevated creatinine, elevated lactic acid, and elevated CRP.  Additionally it showed hyperglycemia with anion gap acidosis consistent with diabetic ketoacidosis.  CT imaging of the abdomen showed extensive soft tissue air throughout the right groin and inguinal region extending to the right lower quadrant of the anterior abdominal wall and medial aspect of the right thigh concerning for gas-forming infection.  She was admitted to hospital medicine service and taken to the OR by General surgery for debridement.  Empiric antibiotics with Zosyn, clindamycin, and vancomycin was started.  She underwent incision and drainage with debridement of the soft tissues down to the muscular fascia on 01/29.  She was then subsequently taken back to the OR on 01/31 where she underwent irrigation and debridement of the wound and insertion of a triple-lumen temporary hemodialysis catheter.  She was subsequently transitioned to meropenem and clindamycin while in [facility].  Unfortunately her hospital course was complicated by acute respiratory failure requiring intubation with mechanical ventilation and worsening ALVARADO prompting initiation of renal replacement therapy.  She was transferred to [facility] for higher level of care.  Upon transfer the patient's antibiotic therapy was deescalated to ceftriaxone.    Infectious disease is consulted for Antibiotic management: currently on merem, concern for nec fasciitis          Interval History: ".  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 Taken to OR on 2/2 for debridement of the right buttocks and groin (#3). Evaluated by Neurology on 2/3 for encephalopathy. CT head with remote infarcts.      Review of Systems   Unable to perform ROS: Intubated     Objective:     Vital Signs (Most Recent):  Temp: 98.2 °F (36.8 °C) (02/04/24 0300)  Pulse: 90 (02/04/24 0615)  Resp: (!) 24 (02/04/24 0530)  BP: (!) 177/79 (02/04/24 0600)  SpO2: 100 % (02/04/24 0615) Vital Signs (24h Range):  Temp:  [98 °F (36.7 °C)-98.4 °F (36.9 °C)] 98.2 °F (36.8 °C)  Pulse:  [87-95] 90  Resp:  [0-26] 24  SpO2:  [97 %-100 %] 100 %  BP: (135-185)/(63-82) 177/79  Arterial Line BP: (0-228)/(0-75) 165/71     Weight: (!) 165 kg (363 lb 12.1 oz)  Body mass index is 58.71 kg/m².    Estimated Creatinine Clearance: 20.1 mL/min (A) (based on SCr of 5.2 mg/dL (H)).     Physical Exam  Vitals and nursing note reviewed.   Constitutional:       Appearance: She is well-developed. She is ill-appearing.      Interventions: She is intubated.   HENT:      Head: Normocephalic and atraumatic.      Right Ear: External ear normal.      Left Ear: External ear normal.   Eyes:      General: No scleral icterus.        Right eye: No discharge.         Left eye: No discharge.      Conjunctiva/sclera: Conjunctivae normal.   Cardiovascular:      Rate and Rhythm: Normal rate and regular rhythm.      Heart sounds: Normal heart sounds. No murmur heard.     No friction rub. No gallop.   Pulmonary:      Effort: Pulmonary effort is normal. No respiratory distress. She is intubated.      Breath sounds: No stridor. Rhonchi present. No wheezing or rales.   Abdominal:      General: Bowel sounds are normal.   Musculoskeletal:         General: No tenderness.   Skin:     General: Skin is warm and dry.      Comments: Wound VAC on right groin area   Neurological:      Mental Status: She is lethargic.      Comments: Does not respond to verbal or tactile stimuli.          Significant Labs: BMP:   Recent Labs   Lab 02/04/24  0317   *      K 3.6      CO2 20*   BUN 54*   CREATININE 5.2*   CALCIUM 7.7*   MG 2.2       CBC:   Recent Labs   Lab 02/03/24  9791  02/04/24  0317   WBC 27.03* 25.10*   HGB 8.5* 7.9*   HCT 24.1* 22.7*    205       Microbiology Results (last 7 days)       Procedure Component Value Units Date/Time    Culture, Anaerobe [[id number]] Collected: 01/30/24 0228    Order Status: Completed Specimen: Wound from Groin Updated: 02/03/24 0758     Anaerobic Culture Culture in progress    Narrative:      Right groin tissue    Culture, Anaerobe [[id number]] Collected: 01/30/24 0219    Order Status: Completed Specimen: Abscess from Groin Updated: 02/03/24 0757     Anaerobic Culture Culture in progress    Narrative:      Right groin abcess    Blood culture x two cultures. Draw prior to antibiotics. [[id number]] Collected: 01/29/24 2118    Order Status: Completed Specimen: Blood from Peripheral, Antecubital, Left Updated: 02/02/24 2303     Blood Culture, Routine No Growth after 4 days.    Narrative:      Aerobic and anaerobic    Blood culture x two cultures. Draw prior to antibiotics. [[id number]] Collected: 01/29/24 1929    Order Status: Completed Specimen: Blood from Peripheral, Antecubital, Right Updated: 02/02/24 2103     Blood Culture, Routine No Growth after 4 days.    Narrative:      Aerobic and anaerobic    Aerobic culture [[id number]]  (Abnormal)  (Susceptibility) Collected: 01/30/24 0228    Order Status: Completed Specimen: Wound from Groin Updated: 02/02/24 0746     Aerobic Bacterial Culture PROTEUS MIRABILIS  Moderate      Narrative:      Right groin tissue    Aerobic culture [[id number]]  (Abnormal)  (Susceptibility) Collected: 01/30/24 0219    Order Status: Completed Specimen: Abscess from Groin Updated: 02/02/24 0746     Aerobic Bacterial Culture PROTEUS MIRABILIS  Moderate      Narrative:      Right groin abcess    AFB Culture & Smear [[id number]] Collected: 01/30/24 0219    Order Status: Completed Specimen: Abscess from Groin Updated: 01/31/24 2127     AFB Culture & Smear Culture in progress     AFB CULTURE STAIN No acid fast bacilli seen.     Narrative:      Right groin abcess    AFB Culture & Smear [[id number]] Collected: 01/30/24 0228    Order Status: Completed Specimen: Wound from Groin Updated: 01/31/24 2127     AFB Culture & Smear Culture in progress     AFB CULTURE STAIN No acid fast bacilli seen.    Narrative:      Right groin tissue    Gram stain [[id number]] Collected: 01/30/24 0228    Order Status: Completed Specimen: Wound from Groin Updated: 01/30/24 1035     Gram Stain Result Moderate Gram positive cocci in pairs and chains      No WBC's    Narrative:      Right groin tissue    Gram stain [[id number]] Collected: 01/30/24 0219    Order Status: Completed Specimen: Abscess from Groin Updated: 01/30/24 1035     Gram Stain Result Moderate Gram positive cocci in pairs and chains      No WBC's    Narrative:      Right groin abcess    Fungus culture [[id number]] Collected: 01/30/24 0219    Order Status: Sent Specimen: Abscess from Groin Updated: 01/30/24 0247    Fungus culture [[id number]] Collected: 01/30/24 0228    Order Status: Sent Specimen: Wound from Groin Updated: 01/30/24 0243    AFB Culture & Smear [[id number]] Collected: 01/30/24 0058    Order Status: Sent Specimen: Abscess from Groin Updated: 01/30/24 0058    AFB Culture & Smear [[id number]] Collected: 01/29/24 1115    Order Status: Sent Specimen: Abscess from Groin Updated: 01/30/24 0019    AFB Culture & Smear [[id number]] Collected: 01/29/24 1115    Order Status: Sent Specimen: Abscess from Groin Updated: 01/30/24 0019    Fungus culture [[id number]] Collected: 01/29/24 1115    Order Status: Canceled Specimen: Abscess from Groin     Gram stain [[id number]] Collected: 01/29/24 1115    Order Status: Canceled Specimen: Abscess from Groin     Culture, Anaerobe [[id number]] Collected: 01/29/24 1115    Order Status: Canceled Specimen: Abscess from Groin     Aerobic culture [[id number]] Collected: 01/29/24 1115    Order Status: Canceled Specimen: Abscess from Groin     Culture, Anaerobe  [[id number]] Collected: 01/29/24 1115    Order Status: Canceled Specimen: Abscess from Groin     Aerobic culture [[id number]] Collected: 01/29/24 1115    Order Status: Canceled Specimen: Abscess from Groin     Gram stain [[id number]] Collected: 01/29/24 1115    Order Status: Canceled Specimen: Abscess from Groin     Fungus culture [[id number]] Collected: 01/29/24 1115    Order Status: Canceled Specimen: Abscess from Groin             Significant Imaging: I have reviewed all pertinent imaging results/findings within the past 24 hours.

## 2024-02-04 NOTE — PLAN OF CARE
EEG cap overnight with significant artifact, slow and disorganized background consistent with encephalopathy, no epileptiform discharges on electrographic seizures. Clinical exam is stable relative to 2/3. Discussed with epilepsy team, and would like a formal study: continuous EEG overnight tonight. If findings are similar to cap study, will plan to discontinue tomorrow morning and obtain MRI brain w/o contrast. Neurology will continue to follow. Contact us with questions/concerns.

## 2024-02-04 NOTE — PROCEDURES
EEG prelim -> this study is not running live on the     The patient is in a room with no live  access.  This means that we are unable to access the EEG in real-time if the patient has an event.  Depending on the clinical scenario, recommend transferring the patient to a room with live  access so that the EEG can be reviewed immediately after any concerning clinical episodes.  If the patient is not transferred, the EEG will be uploaded in the morning and reviewed in its entirety at that time.    Please hit the EEG button with any clinical activity concerning for seizures and describe what you see.    Full report after the completion of the study.    Teetee Saleh MD PhD [facility]  Neurology-Epilepsy  [facility].

## 2024-02-04 NOTE — SUBJECTIVE & OBJECTIVE
Interval History: ".  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 Taken to OR on 2/2 for debridement of the right buttocks and groin (#3). Evaluated by Neurology on 2/3 for encephalopathy. CT head with remote infarcts.     Review of Systems   Unable to perform ROS: Intubated     Objective:     Vital Signs (Most Recent):  Temp: 98.2 °F (36.8 °C) (02/04/24 0300)  Pulse: 90 (02/04/24 0615)  Resp: (!) 24 (02/04/24 0530)  BP: (!) 177/79 (02/04/24 0600)  SpO2: 100 % (02/04/24 0615) Vital Signs (24h Range):  Temp:  [98 °F (36.7 °C)-98.4 °F (36.9 °C)] 98.2 °F (36.8 °C)  Pulse:  [87-95] 90  Resp:  [0-26] 24  SpO2:  [97 %-100 %] 100 %  BP: (135-185)/(63-82) 177/79  Arterial Line BP: (0-228)/(0-75) 165/71     Weight: (!) 165 kg (363 lb 12.1 oz)  Body mass index is 58.71 kg/m².    Estimated Creatinine Clearance: 20.1 mL/min (A) (based on SCr of 5.2 mg/dL (H)).     Physical Exam  Vitals and nursing note reviewed.   Constitutional:       Appearance: She is well-developed. She is ill-appearing.      Interventions: She is intubated.   HENT:      Head: Normocephalic and atraumatic.      Right Ear: External ear normal.      Left Ear: External ear normal.   Eyes:      General: No scleral icterus.        Right eye: No discharge.         Left eye: No discharge.      Conjunctiva/sclera: Conjunctivae normal.   Cardiovascular:      Rate and Rhythm: Normal rate and regular rhythm.      Heart sounds: Normal heart sounds. No murmur heard.     No friction rub. No gallop.   Pulmonary:      Effort: Pulmonary effort is normal. No respiratory distress. She is intubated.      Breath sounds: No stridor. Rhonchi present. No wheezing or rales.   Abdominal:      General: Bowel sounds are normal.   Musculoskeletal:         General: No tenderness.   Skin:     General: Skin is warm and dry.      Comments: Wound VAC on right groin area   Neurological:      Mental Status: She is lethargic.       Comments: Does not respond to verbal or tactile stimuli.          Significant Labs: BMP:   Recent Labs   Lab 02/04/24 0317   *      K 3.6      CO2 20*   BUN 54*   CREATININE 5.2*   CALCIUM 7.7*   MG 2.2       CBC:   Recent Labs   Lab 02/03/24 0327 02/04/24 0317   WBC 27.03* 25.10*   HGB 8.5* 7.9*   HCT 24.1* 22.7*    205       Microbiology Results (last 7 days)       Procedure Component Value Units Date/Time    Culture, Anaerobe [[id number]] Collected: 01/30/24 0228    Order Status: Completed Specimen: Wound from Groin Updated: 02/03/24 0758     Anaerobic Culture Culture in progress    Narrative:      Right groin tissue    Culture, Anaerobe [[id number]] Collected: 01/30/24 0219    Order Status: Completed Specimen: Abscess from Groin Updated: 02/03/24 0757     Anaerobic Culture Culture in progress    Narrative:      Right groin abcess    Blood culture x two cultures. Draw prior to antibiotics. [[id number]] Collected: 01/29/24 2118    Order Status: Completed Specimen: Blood from Peripheral, Antecubital, Left Updated: 02/02/24 2303     Blood Culture, Routine No Growth after 4 days.    Narrative:      Aerobic and anaerobic    Blood culture x two cultures. Draw prior to antibiotics. [[id number]] Collected: 01/29/24 1929    Order Status: Completed Specimen: Blood from Peripheral, Antecubital, Right Updated: 02/02/24 2103     Blood Culture, Routine No Growth after 4 days.    Narrative:      Aerobic and anaerobic    Aerobic culture [[id number]]  (Abnormal)  (Susceptibility) Collected: 01/30/24 0228    Order Status: Completed Specimen: Wound from Groin Updated: 02/02/24 0746     Aerobic Bacterial Culture PROTEUS MIRABILIS  Moderate      Narrative:      Right groin tissue    Aerobic culture [[id number]]  (Abnormal)  (Susceptibility) Collected: 01/30/24 0219    Order Status: Completed Specimen: Abscess from Groin Updated: 02/02/24 0746     Aerobic Bacterial Culture PROTEUS MIRABILIS  Moderate       Narrative:      Right groin abcess    AFB Culture & Smear [[id number]] Collected: 01/30/24 0219    Order Status: Completed Specimen: Abscess from Groin Updated: 01/31/24 2127     AFB Culture & Smear Culture in progress     AFB CULTURE STAIN No acid fast bacilli seen.    Narrative:      Right groin abcess    AFB Culture & Smear [[id number]] Collected: 01/30/24 0228    Order Status: Completed Specimen: Wound from Groin Updated: 01/31/24 2127     AFB Culture & Smear Culture in progress     AFB CULTURE STAIN No acid fast bacilli seen.    Narrative:      Right groin tissue    Gram stain [[id number]] Collected: 01/30/24 0228    Order Status: Completed Specimen: Wound from Groin Updated: 01/30/24 1035     Gram Stain Result Moderate Gram positive cocci in pairs and chains      No WBC's    Narrative:      Right groin tissue    Gram stain [[id number]] Collected: 01/30/24 0219    Order Status: Completed Specimen: Abscess from Groin Updated: 01/30/24 1035     Gram Stain Result Moderate Gram positive cocci in pairs and chains      No WBC's    Narrative:      Right groin abcess    Fungus culture [[id number]] Collected: 01/30/24 0219    Order Status: Sent Specimen: Abscess from Groin Updated: 01/30/24 0247    Fungus culture [[id number]] Collected: 01/30/24 0228    Order Status: Sent Specimen: Wound from Groin Updated: 01/30/24 0243    AFB Culture & Smear [[id number]] Collected: 01/30/24 0058    Order Status: Sent Specimen: Abscess from Groin Updated: 01/30/24 0058    AFB Culture & Smear [[id number]] Collected: 01/29/24 1115    Order Status: Sent Specimen: Abscess from Groin Updated: 01/30/24 0019    AFB Culture & Smear [[id number]] Collected: 01/29/24 1115    Order Status: Sent Specimen: Abscess from Groin Updated: 01/30/24 0019    Fungus culture [[id number]] Collected: 01/29/24 1115    Order Status: Canceled Specimen: Abscess from Groin     Gram stain [[id number]] Collected: 01/29/24 1115    Order Status: Canceled Specimen:  Abscess from Groin     Culture, Anaerobe [[id number]] Collected: 01/29/24 1115    Order Status: Canceled Specimen: Abscess from Groin     Aerobic culture [[id number]] Collected: 01/29/24 1115    Order Status: Canceled Specimen: Abscess from Groin     Culture, Anaerobe [[id number]] Collected: 01/29/24 1115    Order Status: Canceled Specimen: Abscess from Groin     Aerobic culture [[id number]] Collected: 01/29/24 1115    Order Status: Canceled Specimen: Abscess from Groin     Gram stain [[id number]] Collected: 01/29/24 1115    Order Status: Canceled Specimen: Abscess from Groin     Fungus culture [[id number]] Collected: 01/29/24 1115    Order Status: Canceled Specimen: Abscess from Groin             Significant Imaging: I have reviewed all pertinent imaging results/findings within the past 24 hours.

## 2024-02-04 NOTE — PLAN OF CARE
"      SICU PLAN OF CARE NOTE    Dx: Ayaz's gangrene    Shift Events: Patient taken to CT for head scan. Insulin gtt stepdown orders started. CRRT started. PRN hydralazine given x1 to maintain systolic goals. Plan to go to OR to remove wound vac Monday.     Plan of care reviewed with family, no questions or concerns at this time.     Goals of Care: sbp  < 180 / > 100     Neuro: arouses to pain     Vital Signs: BP (!) 153/68 (BP Location: Right forearm, Patient Position: Lying)   Pulse 92   Temp 98.4 °F (36.9 °C)   Resp 18   Ht 5' 6" (1.676 m)   Wt (!) 164.5 kg (362 lb 10.5 oz)   LMP 01/01/2024 (Approximate) Comment: tubal ligation  SpO2 99%   BMI 58.53 kg/m²     Respiratory: Ventilator    Diet: Tube Feeds    Gtts: Insulin @ 1.8 units    Urine Output: Anuric     Wound Vac: 50 cc / shift     Labs/Accuchecks: Daily labs, Accucheck Q4hr.    Skin: Bed plugged in and inflated, no evidence of new skin breakdown throughout shift.       "

## 2024-02-04 NOTE — ASSESSMENT & PLAN NOTE
I have reviewed hospital notes from  SICU service and other specialty providers. I have also reviewed CBC, CMP/BMP,  cultures and imaging with my interpretation as documented.     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 Operative cultures showing P mirabilis. She is afebrile, and with ongoing leukocytosis.   Continue ceftriaxone 2 gm IV daily.  Continue metronidazole.   Will follow up culture results.  Surgical debridements as indicated for non viable tissue as per Surgical Service.  Discussed with the patients daughter and boyfriend at bedside.  Discussed management plan with the staff and/or members from SICU and General Surgery service.

## 2024-02-04 NOTE — PROCEDURES
EEG prelim  14:00 p.m.-16:31 p.m.    Background:  Moderate-high voltage mixed frequency polymorphic theta/delta activity.    Impression:  Moderate-severe encephalopathy.  There are no pushbutton activations, no epileptiform discharges, and no electrographic seizures on this portion of the recording session.    Please hit the EEG button with any clinical activity concerning for seizures and describe what you see.    Full report after the completion of the study.    Teetee Saleh MD PhD [facility]  Neurology-Epilepsy  [facility].

## 2024-02-04 NOTE — PROGRESS NOTES
EEG Hook up  PM Check Electrodes had to be fixed.No  EKG off due to doctors doing procedure  Skin Integrity: Normal     Bolivar Neff   02/04/2024 4:11 PM

## 2024-02-04 NOTE — PROCEDURES
ELECTRODE CAP LTM EEG/VIDEO MONITORING REPORT  Sarah Saravia  [id number]  1970    DATE OF SERVICE:  02/03/2024  DATE OF ADMISSION: 1/29/2024  6:26 PM    ADMITTING/REQUESTING PROVIDER: Jovan Gonzales MD    REASON FOR CONSULT:  53-year-old woman with multiple complicated medical comorbidities including DKA, necrotizing fasciitis, acute kidney injury now on SLED with prolonged decreased responsiveness. Electrode cap EEG placed to evaluate for evidence of status epilepticus.    METHODOLOGY   Electroencephalographic (EEG) recording is with electrodes placed according to the International 10-20 placement system.  Thirty two (32) channels of digital signal (sampling rate of 512/sec) including T1 and T2 was simultaneously recorded from the scalp and may include  EKG, EMG, and/or eye monitors.  Recording band pass was 0.1 to 512 hz.  Digital video recording of the patient is simultaneously recorded with the EEG.  The patient is instructed report clinical symptoms which may occur during the recording session.  EEG and video recording is stored and archived in digital format.  Activation procedures which include photic stimulation, hyperventilation and instructing patients to perform simple task are done in selected patients.   The EEG is displayed on a monitor screen and can be reviewed using different montages.  Computer assisted analysis is employed to detect spike and electrographic seizure activity.   The entire record is submitted for computer analysis.  The entire recording is visually reviewed and the times identified by computer analysis as being spikes or seizures are reviewed again.  Compresses spectral analysis (CSA) is also performed on the activity recorded from each individual channel.  This is displayed as a power display of frequencies from 0 to 30 Hz over time.   The CSA is reviewed looking for asymmetries in power between homologous areas of the scalp and then compared with the original EEG  recording.     Biodel software is also utilized in the review of this study.  This software suite analyzes the EEG recording in multiple domains.  Coherence and rhythmicity is computed to identify EEG sections which may contain organized seizures.  Each channel undergoes analysis to detect presence of spike and sharp waves which have special and morphological characteristic of epileptic activity.  The routine EEG recording is converted from spacial into frequency domain.  This is then displayed comparing homologous areas to identify areas of significant asymmetry.  Algorithm to identify non-cortically generated artifact is used to separate eye movement, EMG and other artifact from the EEG.      RECORDING TIMES  Start on 02/03/2024 at 21:38 p.m.  Stop on 02/04/2024 at 08:41 a.m.  A total of 11 hours and 2 minutes of EEG recording is obtained.    EEG FINDINGS  Background activity:   Within the limitation of a significant amount of movement/lead artifact on an electrode cap EEG, the background is continuous mixed frequency activity.  There is modest variability throughout the recording session.    Sleep:  Poorly formed sleep architecture is apparent    Activation procedures:   Hyperventilation is not performed  Photic stimulation is not performed    Cardiac Monitor:   Electrode caps do not have EKG capabilities    Impression:   Within the limitation of a significant amount of lead/movement artifact on an electrode cap EEG, the background is slow and disorganized consistent with an encephalopathy, the exact degree of which is hard to determine on an electrode cap EEG.  There are no pushbutton activations.  There are no apparent epileptiform discharges or electrographic seizures.  Depending on the clinical scenario, consider additional monitoring with standard scalp electrodes.    Teetee Saleh MD PhD [facility]  Neurology-Epilepsy  [facility].

## 2024-02-04 NOTE — PROGRESS NOTES
Venous lumen with no issues.  Arterial lumen with no blood return and with resistance to flushing.  Lines reversed.     02/03/24 2100 02/03/24 2200   Treatment   Treatment Type SLED SLED   Treatment Status Clotting;Blood returned Restart   Dialysis Machine Number  --  [id number]   Dialyzer Time (hours) 2.39 0   BVP (Liters) 22 L 0 L   Solutions Labeled and Current   --  Yes   Access  --  Temporary Cath;Right;IJ   Catheter Dressing Intact   --  Yes   Alarms Engaged  --  Yes   CRRT Comments Pt rinsed back by ICU RN SLED restarted

## 2024-02-04 NOTE — ASSESSMENT & PLAN NOTE
53 y.o. 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       - To OR next week for wound vac change  - Daily SBTs  - Okay for DVT ppx   - Remainder of care per SICU

## 2024-02-04 NOTE — ASSESSMENT & PLAN NOTE
Lab Results   Component Value Date    CREATININE 5.2 (H) 02/04/2024     Avoid insulin stacking  Titrate insulin slowly

## 2024-02-04 NOTE — SUBJECTIVE & OBJECTIVE
Interval History/Significant Events: Transitioned to insulin gtt overnight, on 1.8 this am. 24 hr EEG started at 9 pm last night, will need MRI w/o contrast following EEG. Clotted SLED but was able to restart, 6 hrs left of SLED. Continued HTN to 180s systolic.    Follow-up For: Procedure(s) (LRB):  DEBRIDEMENT, WOUND (Bilateral)    Post-Operative Day: 2 Days Post-Op    Objective:     Vital Signs (Most Recent):  Temp: 98.2 °F (36.8 °C) (02/04/24 0300)  Pulse: 90 (02/04/24 0545)  Resp: (!) 24 (02/04/24 0530)  BP: (!) 168/70 (02/04/24 0500)  SpO2: 100 % (02/04/24 0545) Vital Signs (24h Range):  Temp:  [98 °F (36.7 °C)-98.4 °F (36.9 °C)] 98.2 °F (36.8 °C)  Pulse:  [86-95] 90  Resp:  [0-26] 24  SpO2:  [97 %-100 %] 100 %  BP: (135-185)/(63-82) 168/70  Arterial Line BP: (0-228)/(0-73) 168/73     Weight: (!) 164.5 kg (362 lb 10.5 oz)  Body mass index is 58.53 kg/m².      Intake/Output Summary (Last 24 hours) at 2/4/2024 0606  Last data filed at 2/4/2024 0515  Gross per 24 hour   Intake 2187.68 ml   Output 1440 ml   Net 747.68 ml          Physical Exam  Vitals reviewed.   Constitutional:       General: She is not in acute distress.     Appearance: She is obese. She is ill-appearing.   HENT:      Head: Normocephalic and atraumatic.      Comments: EEG leads in place     Nose: Nose normal.      Mouth/Throat:      Comments: intubated  Eyes:      Conjunctiva/sclera: Conjunctivae normal.      Comments: Brisk corneal and pupillary responses   Cardiovascular:      Rate and Rhythm: Normal rate.      Pulses: Normal pulses.   Pulmonary:      Effort: No respiratory distress.      Comments: Intubated   Abdominal:      General: Abdomen is flat. Bowel sounds are normal. There is no distension.      Palpations: Abdomen is soft.      Tenderness: There is no abdominal tenderness.   Musculoskeletal:         General: Normal range of motion.      Cervical back: Normal range of motion and neck supple.   Skin:     Capillary Refill: Capillary  refill takes less than 2 seconds.      Comments: Rt groin with wound vac in place   Neurological:      General: No focal deficit present.      Comments: Withdraw to pain, eye opening to pain   Psychiatric:         Mood and Affect: Mood normal.            Vents:  Vent Mode: A/C (02/04/24 0356)  Set Rate: 18 BPM (02/04/24 0356)  Vt Set: 420 mL (02/04/24 0356)  PEEP/CPAP: 5 cmH20 (02/04/24 0356)  Oxygen Concentration (%): 40 (02/04/24 0545)  Peak Airway Pressure: 36 cmH20 (02/04/24 0356)  Plateau Pressure: 20 cmH20 (02/04/24 0356)  Total Ve: 9.11 L/m (02/04/24 0356)  Negative Inspiratory Force (cm H2O): 0 (02/04/24 0356)  F/VT Ratio<105 (RSBI): (!) 40.54 (02/03/24 1642)    Lines/Drains/Airways       Central Venous Catheter Line  Duration             Trialysis (Dialysis) Catheter 01/31/24 1051 right internal jugular 3 days              Drain  Duration                  Urethral Catheter 01/29/24 2315 5 days         NG/OG Tube 02/01/24 2250 18 Fr. Center mouth 2 days              Airway  Duration                  Airway - Non-Surgical 01/31/24 1020 3 days              Arterial Line  Duration             Arterial Line 01/31/24 1025 Right Radial 3 days              Peripheral Intravenous Line  Duration                  Peripheral IV - Single Lumen 01/29/24 2116 Left Antecubital 5 days                    Significant Labs:    CBC/Anemia Profile:  Recent Labs   Lab 02/03/24 0327 02/04/24 0317   WBC 27.03* 25.10*   HGB 8.5* 7.9*   HCT 24.1* 22.7*    205   MCV 76* 76*   RDW 14.5 14.6*        Chemistries:  Recent Labs   Lab 02/03/24  0327 02/03/24  2240 02/04/24 0317    136 136   K 4.1 3.8 3.6   CL 98 104 102   CO2 22* 22* 20*   BUN 53* 50* 54*   CREATININE 6.2* 5.2* 5.2*   CALCIUM 8.1* 7.5* 7.7*   ALBUMIN 1.5* 1.5* 1.5*   PROT 6.6  --   --    BILITOT 0.3  --   --    ALKPHOS 134  --   --    ALT 22  --   --    AST 33  --   --    MG 2.3 2.1 2.2   PHOS 6.3* 4.0 3.9  3.9       Significant Imaging:  I have reviewed  all pertinent imaging results/findings within the past 24 hours.

## 2024-02-04 NOTE — NURSING
EEG connected to pt, unable to see live  per Dr. Saleh. Attempted to correct with IT, team aware. EEG to be downloaded in the morning, will call Dr. Saleh per any suspected seizure activity. No new orders at this time.

## 2024-02-05 PROBLEM — D72.829 LEUKOCYTOSIS: Status: ACTIVE | Noted: 2024-02-05

## 2024-02-05 LAB
ALBUMIN SERPL BCP-MCNC: 1.6 G/DL (ref 3.5–5.2)
ALBUMIN SERPL BCP-MCNC: 1.7 G/DL (ref 3.5–5.2)
ALP SERPL-CCNC: 150 U/L (ref 55–135)
ALT SERPL W/O P-5'-P-CCNC: 15 U/L (ref 10–44)
ANION GAP SERPL CALC-SCNC: 10 MMOL/L (ref 8–16)
ANION GAP SERPL CALC-SCNC: 10 MMOL/L (ref 8–16)
ANION GAP SERPL CALC-SCNC: 11 MMOL/L (ref 8–16)
ANION GAP SERPL CALC-SCNC: 11 MMOL/L (ref 8–16)
ANISOCYTOSIS BLD QL SMEAR: SLIGHT
AST SERPL-CCNC: 30 U/L (ref 10–40)
BASOPHILS NFR BLD: 0 % (ref 0–1.9)
BILIRUB SERPL-MCNC: 0.3 MG/DL (ref 0.1–1)
BUN SERPL-MCNC: 35 MG/DL (ref 6–20)
BUN SERPL-MCNC: 52 MG/DL (ref 6–20)
BUN SERPL-MCNC: 52 MG/DL (ref 6–20)
BUN SERPL-MCNC: 63 MG/DL (ref 6–20)
CA-I BLDV-SCNC: 1.03 MMOL/L (ref 1.06–1.42)
CA-I BLDV-SCNC: 1.06 MMOL/L (ref 1.06–1.42)
CALCIUM SERPL-MCNC: 8.1 MG/DL (ref 8.7–10.5)
CALCIUM SERPL-MCNC: 8.1 MG/DL (ref 8.7–10.5)
CALCIUM SERPL-MCNC: 8.2 MG/DL (ref 8.7–10.5)
CALCIUM SERPL-MCNC: 8.4 MG/DL (ref 8.7–10.5)
CHLORIDE SERPL-SCNC: 105 MMOL/L (ref 95–110)
CHLORIDE SERPL-SCNC: 106 MMOL/L (ref 95–110)
CO2 SERPL-SCNC: 19 MMOL/L (ref 23–29)
CO2 SERPL-SCNC: 19 MMOL/L (ref 23–29)
CO2 SERPL-SCNC: 20 MMOL/L (ref 23–29)
CO2 SERPL-SCNC: 21 MMOL/L (ref 23–29)
CREAT SERPL-MCNC: 3.5 MG/DL (ref 0.5–1.4)
CREAT SERPL-MCNC: 5.2 MG/DL (ref 0.5–1.4)
CREAT SERPL-MCNC: 5.2 MG/DL (ref 0.5–1.4)
CREAT SERPL-MCNC: 5.8 MG/DL (ref 0.5–1.4)
DIFFERENTIAL METHOD BLD: ABNORMAL
EOSINOPHIL NFR BLD: 0 % (ref 0–8)
ERYTHROCYTE [DISTWIDTH] IN BLOOD BY AUTOMATED COUNT: 15.1 % (ref 11.5–14.5)
EST. GFR  (NO RACE VARIABLE): 15 ML/MIN/1.73 M^2
EST. GFR  (NO RACE VARIABLE): 8.2 ML/MIN/1.73 M^2
EST. GFR  (NO RACE VARIABLE): 9.3 ML/MIN/1.73 M^2
EST. GFR  (NO RACE VARIABLE): 9.3 ML/MIN/1.73 M^2
GIANT PLATELETS BLD QL SMEAR: PRESENT
GLUCOSE SERPL-MCNC: 131 MG/DL (ref 70–110)
GLUCOSE SERPL-MCNC: 161 MG/DL (ref 70–110)
GLUCOSE SERPL-MCNC: 227 MG/DL (ref 70–110)
GLUCOSE SERPL-MCNC: 229 MG/DL (ref 70–110)
HCT VFR BLD AUTO: 24.6 % (ref 37–48.5)
HGB BLD-MCNC: 8.3 G/DL (ref 12–16)
HYPOCHROMIA BLD QL SMEAR: ABNORMAL
IMM GRANULOCYTES # BLD AUTO: ABNORMAL K/UL (ref 0–0.04)
IMM GRANULOCYTES NFR BLD AUTO: ABNORMAL % (ref 0–0.5)
LYMPHOCYTES NFR BLD: 8 % (ref 18–48)
MAGNESIUM SERPL-MCNC: 1.9 MG/DL (ref 1.6–2.6)
MAGNESIUM SERPL-MCNC: 2.1 MG/DL (ref 1.6–2.6)
MAGNESIUM SERPL-MCNC: 2.2 MG/DL (ref 1.6–2.6)
MCH RBC QN AUTO: 26 PG (ref 27–31)
MCHC RBC AUTO-ENTMCNC: 33.7 G/DL (ref 32–36)
MCV RBC AUTO: 77 FL (ref 82–98)
METAMYELOCYTES NFR BLD MANUAL: 5 %
MONOCYTES NFR BLD: 4 % (ref 4–15)
MYELOCYTES NFR BLD MANUAL: 3 %
NEUTROPHILS NFR BLD: 74 % (ref 38–73)
NEUTS BAND NFR BLD MANUAL: 6 %
NRBC BLD-RTO: 0 /100 WBC
OVALOCYTES BLD QL SMEAR: ABNORMAL
PHOSPHATE SERPL-MCNC: 2.8 MG/DL (ref 2.7–4.5)
PHOSPHATE SERPL-MCNC: 4.3 MG/DL (ref 2.7–4.5)
PHOSPHATE SERPL-MCNC: 4.3 MG/DL (ref 2.7–4.5)
PHOSPHATE SERPL-MCNC: 4.8 MG/DL (ref 2.7–4.5)
PLATELET # BLD AUTO: 227 K/UL (ref 150–450)
PLATELET BLD QL SMEAR: ABNORMAL
PMV BLD AUTO: 11.4 FL (ref 9.2–12.9)
POCT GLUCOSE: 131 MG/DL (ref 70–110)
POCT GLUCOSE: 138 MG/DL (ref 70–110)
POCT GLUCOSE: 144 MG/DL (ref 70–110)
POCT GLUCOSE: 153 MG/DL (ref 70–110)
POCT GLUCOSE: 205 MG/DL (ref 70–110)
POCT GLUCOSE: 229 MG/DL (ref 70–110)
POCT GLUCOSE: 237 MG/DL (ref 70–110)
POCT GLUCOSE: 251 MG/DL (ref 70–110)
POIKILOCYTOSIS BLD QL SMEAR: SLIGHT
POLYCHROMASIA BLD QL SMEAR: ABNORMAL
POTASSIUM SERPL-SCNC: 3.8 MMOL/L (ref 3.5–5.1)
POTASSIUM SERPL-SCNC: 3.8 MMOL/L (ref 3.5–5.1)
POTASSIUM SERPL-SCNC: 3.9 MMOL/L (ref 3.5–5.1)
POTASSIUM SERPL-SCNC: 4.1 MMOL/L (ref 3.5–5.1)
PROT SERPL-MCNC: 6.8 G/DL (ref 6–8.4)
RBC # BLD AUTO: 3.19 M/UL (ref 4–5.4)
SODIUM SERPL-SCNC: 135 MMOL/L (ref 136–145)
SODIUM SERPL-SCNC: 136 MMOL/L (ref 136–145)
SODIUM SERPL-SCNC: 136 MMOL/L (ref 136–145)
SODIUM SERPL-SCNC: 137 MMOL/L (ref 136–145)
SPHEROCYTES BLD QL SMEAR: ABNORMAL
TARGETS BLD QL SMEAR: ABNORMAL
TOXIC GRANULES BLD QL SMEAR: PRESENT
WBC # BLD AUTO: 29.32 K/UL (ref 3.9–12.7)

## 2024-02-05 PROCEDURE — 63600175 PHARM REV CODE 636 W HCPCS: Performed by: HOSPITALIST

## 2024-02-05 PROCEDURE — 82330 ASSAY OF CALCIUM: CPT | Mod: 91 | Performed by: NURSE PRACTITIONER

## 2024-02-05 PROCEDURE — 20000000 HC ICU ROOM

## 2024-02-05 PROCEDURE — 63600175 PHARM REV CODE 636 W HCPCS

## 2024-02-05 PROCEDURE — 90945 DIALYSIS ONE EVALUATION: CPT

## 2024-02-05 PROCEDURE — 25000242 PHARM REV CODE 250 ALT 637 W/ HCPCS

## 2024-02-05 PROCEDURE — 99900035 HC TECH TIME PER 15 MIN (STAT)

## 2024-02-05 PROCEDURE — 85027 COMPLETE CBC AUTOMATED: CPT | Performed by: STUDENT IN AN ORGANIZED HEALTH CARE EDUCATION/TRAINING PROGRAM

## 2024-02-05 PROCEDURE — 63600175 PHARM REV CODE 636 W HCPCS: Mod: JZ,JG | Performed by: NURSE PRACTITIONER

## 2024-02-05 PROCEDURE — 83735 ASSAY OF MAGNESIUM: CPT

## 2024-02-05 PROCEDURE — 27100171 HC OXYGEN HIGH FLOW UP TO 24 HOURS

## 2024-02-05 PROCEDURE — 94003 VENT MGMT INPAT SUBQ DAY: CPT

## 2024-02-05 PROCEDURE — 99233 SBSQ HOSP IP/OBS HIGH 50: CPT | Mod: ,,, | Performed by: NURSE PRACTITIONER

## 2024-02-05 PROCEDURE — 99291 CRITICAL CARE FIRST HOUR: CPT | Mod: 24,,, | Performed by: STUDENT IN AN ORGANIZED HEALTH CARE EDUCATION/TRAINING PROGRAM

## 2024-02-05 PROCEDURE — 25000003 PHARM REV CODE 250

## 2024-02-05 PROCEDURE — 94761 N-INVAS EAR/PLS OXIMETRY MLT: CPT

## 2024-02-05 PROCEDURE — 82330 ASSAY OF CALCIUM: CPT | Performed by: STUDENT IN AN ORGANIZED HEALTH CARE EDUCATION/TRAINING PROGRAM

## 2024-02-05 PROCEDURE — 25000003 PHARM REV CODE 250: Mod: JZ,JG

## 2024-02-05 PROCEDURE — 80069 RENAL FUNCTION PANEL: CPT | Performed by: STUDENT IN AN ORGANIZED HEALTH CARE EDUCATION/TRAINING PROGRAM

## 2024-02-05 PROCEDURE — 99233 SBSQ HOSP IP/OBS HIGH 50: CPT | Mod: ,,, | Performed by: INTERNAL MEDICINE

## 2024-02-05 PROCEDURE — 80053 COMPREHEN METABOLIC PANEL: CPT | Performed by: STUDENT IN AN ORGANIZED HEALTH CARE EDUCATION/TRAINING PROGRAM

## 2024-02-05 PROCEDURE — 99233 SBSQ HOSP IP/OBS HIGH 50: CPT | Mod: ,,, | Performed by: STUDENT IN AN ORGANIZED HEALTH CARE EDUCATION/TRAINING PROGRAM

## 2024-02-05 PROCEDURE — 25000003 PHARM REV CODE 250: Performed by: NURSE PRACTITIONER

## 2024-02-05 PROCEDURE — 99900026 HC AIRWAY MAINTENANCE (STAT)

## 2024-02-05 PROCEDURE — 80069 RENAL FUNCTION PANEL: CPT | Mod: 91 | Performed by: NURSE PRACTITIONER

## 2024-02-05 PROCEDURE — 85007 BL SMEAR W/DIFF WBC COUNT: CPT | Performed by: STUDENT IN AN ORGANIZED HEALTH CARE EDUCATION/TRAINING PROGRAM

## 2024-02-05 PROCEDURE — 99232 SBSQ HOSP IP/OBS MODERATE 35: CPT | Mod: ,,, | Performed by: NURSE PRACTITIONER

## 2024-02-05 PROCEDURE — 83735 ASSAY OF MAGNESIUM: CPT | Mod: 91 | Performed by: NURSE PRACTITIONER

## 2024-02-05 PROCEDURE — 63600175 PHARM REV CODE 636 W HCPCS: Mod: JZ,JG | Performed by: INTERNAL MEDICINE

## 2024-02-05 RX ORDER — AMLODIPINE BESYLATE 5 MG/1
5 TABLET ORAL ONCE
Status: DISCONTINUED | OUTPATIENT
Start: 2024-02-05 | End: 2024-02-05

## 2024-02-05 RX ORDER — CALCIUM GLUCONATE 20 MG/ML
1 INJECTION, SOLUTION INTRAVENOUS
Status: COMPLETED | OUTPATIENT
Start: 2024-02-05 | End: 2024-02-05

## 2024-02-05 RX ORDER — AMLODIPINE BESYLATE 5 MG/1
5 TABLET ORAL ONCE
Status: COMPLETED | OUTPATIENT
Start: 2024-02-05 | End: 2024-02-05

## 2024-02-05 RX ORDER — OXYCODONE HCL 5 MG/5 ML
10 SOLUTION, ORAL ORAL EVERY 6 HOURS PRN
Status: DISCONTINUED | OUTPATIENT
Start: 2024-02-05 | End: 2024-02-14

## 2024-02-05 RX ORDER — AMLODIPINE BESYLATE 10 MG/1
10 TABLET ORAL DAILY
Status: DISCONTINUED | OUTPATIENT
Start: 2024-02-06 | End: 2024-02-22

## 2024-02-05 RX ORDER — MAGNESIUM SULFATE HEPTAHYDRATE 40 MG/ML
2 INJECTION, SOLUTION INTRAVENOUS
Status: DISPENSED | OUTPATIENT
Start: 2024-02-05 | End: 2024-02-06

## 2024-02-05 RX ORDER — OXYCODONE HCL 5 MG/5 ML
5 SOLUTION, ORAL ORAL EVERY 6 HOURS PRN
Status: DISCONTINUED | OUTPATIENT
Start: 2024-02-05 | End: 2024-02-14

## 2024-02-05 RX ORDER — OXYCODONE HCL 10 MG/1
10 TABLET, FILM COATED, EXTENDED RELEASE ORAL EVERY 8 HOURS PRN
Status: DISCONTINUED | OUTPATIENT
Start: 2024-02-05 | End: 2024-02-05

## 2024-02-05 RX ORDER — FAMOTIDINE 20 MG/1
20 TABLET, FILM COATED ORAL DAILY
Status: DISCONTINUED | OUTPATIENT
Start: 2024-02-05 | End: 2024-02-19

## 2024-02-05 RX ORDER — INSULIN ASPART 100 [IU]/ML
14 INJECTION, SOLUTION INTRAVENOUS; SUBCUTANEOUS EVERY 4 HOURS
Status: DISCONTINUED | OUTPATIENT
Start: 2024-02-05 | End: 2024-02-07

## 2024-02-05 RX ORDER — HYDROMORPHONE HYDROCHLORIDE 1 MG/ML
0.5 INJECTION, SOLUTION INTRAMUSCULAR; INTRAVENOUS; SUBCUTANEOUS EVERY 4 HOURS PRN
Status: DISCONTINUED | OUTPATIENT
Start: 2024-02-05 | End: 2024-02-10

## 2024-02-05 RX ADMIN — CALCIUM GLUCONATE 1 G: 20 INJECTION, SOLUTION INTRAVENOUS at 06:02

## 2024-02-05 RX ADMIN — INSULIN ASPART 14 UNITS: 100 INJECTION, SOLUTION INTRAVENOUS; SUBCUTANEOUS at 04:02

## 2024-02-05 RX ADMIN — PSYLLIUM HUSK 1 PACKET: 3.4 POWDER ORAL at 08:02

## 2024-02-05 RX ADMIN — INSULIN ASPART 2 UNITS: 100 INJECTION, SOLUTION INTRAVENOUS; SUBCUTANEOUS at 12:02

## 2024-02-05 RX ADMIN — DEXTROSE MONOHYDRATE, SODIUM CITRATE, AND CITRIC ACID MONOHYDRATE: 2.45; 2.2; .8 INJECTION, SOLUTION INTRAVENOUS at 06:02

## 2024-02-05 RX ADMIN — CEFTRIAXONE 2 G: 2 INJECTION, POWDER, FOR SOLUTION INTRAMUSCULAR; INTRAVENOUS at 09:02

## 2024-02-05 RX ADMIN — INSULIN ASPART 14 UNITS: 100 INJECTION, SOLUTION INTRAVENOUS; SUBCUTANEOUS at 08:02

## 2024-02-05 RX ADMIN — CALCIUM GLUCONATE: 98 INJECTION, SOLUTION INTRAVENOUS at 06:02

## 2024-02-05 RX ADMIN — METRONIDAZOLE 500 MG: 5 INJECTION, SOLUTION INTRAVENOUS at 11:02

## 2024-02-05 RX ADMIN — AMLODIPINE BESYLATE 5 MG: 5 TABLET ORAL at 06:02

## 2024-02-05 RX ADMIN — HYDROMORPHONE HYDROCHLORIDE 0.5 MG: 0.5 INJECTION, SOLUTION INTRAMUSCULAR; INTRAVENOUS; SUBCUTANEOUS at 02:02

## 2024-02-05 RX ADMIN — INSULIN ASPART 6 UNITS: 100 INJECTION, SOLUTION INTRAVENOUS; SUBCUTANEOUS at 08:02

## 2024-02-05 RX ADMIN — OXYCODONE HYDROCHLORIDE 10 MG: 5 SOLUTION ORAL at 11:02

## 2024-02-05 RX ADMIN — ACETAMINOPHEN 650 MG: 325 TABLET ORAL at 07:02

## 2024-02-05 RX ADMIN — OXYCODONE HYDROCHLORIDE 10 MG: 5 SOLUTION ORAL at 09:02

## 2024-02-05 RX ADMIN — FAMOTIDINE 20 MG: 20 TABLET, FILM COATED ORAL at 09:02

## 2024-02-05 RX ADMIN — INSULIN ASPART 12 UNITS: 100 INJECTION, SOLUTION INTRAVENOUS; SUBCUTANEOUS at 04:02

## 2024-02-05 RX ADMIN — HYDROMORPHONE HYDROCHLORIDE 0.5 MG: 0.5 INJECTION, SOLUTION INTRAMUSCULAR; INTRAVENOUS; SUBCUTANEOUS at 07:02

## 2024-02-05 RX ADMIN — METRONIDAZOLE 500 MG: 5 INJECTION, SOLUTION INTRAVENOUS at 02:02

## 2024-02-05 RX ADMIN — INSULIN ASPART 2 UNITS: 100 INJECTION, SOLUTION INTRAVENOUS; SUBCUTANEOUS at 04:02

## 2024-02-05 RX ADMIN — AMLODIPINE BESYLATE 5 MG: 5 TABLET ORAL at 09:02

## 2024-02-05 RX ADMIN — INSULIN ASPART 14 UNITS: 100 INJECTION, SOLUTION INTRAVENOUS; SUBCUTANEOUS at 11:02

## 2024-02-05 RX ADMIN — INSULIN ASPART 12 UNITS: 100 INJECTION, SOLUTION INTRAVENOUS; SUBCUTANEOUS at 08:02

## 2024-02-05 RX ADMIN — INSULIN ASPART 12 UNITS: 100 INJECTION, SOLUTION INTRAVENOUS; SUBCUTANEOUS at 12:02

## 2024-02-05 RX ADMIN — INSULIN ASPART 4 UNITS: 100 INJECTION, SOLUTION INTRAVENOUS; SUBCUTANEOUS at 11:02

## 2024-02-05 RX ADMIN — METRONIDAZOLE 500 MG: 5 INJECTION, SOLUTION INTRAVENOUS at 07:02

## 2024-02-05 RX ADMIN — HYDROMORPHONE HYDROCHLORIDE 0.5 MG: 1 INJECTION, SOLUTION INTRAMUSCULAR; INTRAVENOUS; SUBCUTANEOUS at 08:02

## 2024-02-05 RX ADMIN — PSYLLIUM HUSK 1 PACKET: 3.4 POWDER ORAL at 09:02

## 2024-02-05 RX ADMIN — CALCIUM GLUCONATE 1 G: 20 INJECTION, SOLUTION INTRAVENOUS at 05:02

## 2024-02-05 RX ADMIN — ACETAMINOPHEN 650 MG: 325 TABLET ORAL at 12:02

## 2024-02-05 RX ADMIN — HEPARIN SODIUM 7500 UNITS: 5000 INJECTION INTRAVENOUS; SUBCUTANEOUS at 10:02

## 2024-02-05 RX ADMIN — HEPARIN SODIUM 7500 UNITS: 5000 INJECTION INTRAVENOUS; SUBCUTANEOUS at 01:02

## 2024-02-05 RX ADMIN — HEPARIN SODIUM 7500 UNITS: 5000 INJECTION INTRAVENOUS; SUBCUTANEOUS at 05:02

## 2024-02-05 RX ADMIN — HYDRALAZINE HYDROCHLORIDE 10 MG: 20 INJECTION, SOLUTION INTRAMUSCULAR; INTRAVENOUS at 11:02

## 2024-02-05 RX ADMIN — ACETAMINOPHEN 650 MG: 325 TABLET ORAL at 05:02

## 2024-02-05 RX ADMIN — ACETAMINOPHEN 650 MG: 325 TABLET ORAL at 11:02

## 2024-02-05 NOTE — ASSESSMENT & PLAN NOTE
Neuro/Psych:   #Acute Encephalopathy  CTH 2/3 with scattered hypoattenuation likely representing age indeterminate infarcts. EEG findings consistent with moderate-severe encephalopathy.  -- Sedation: None  -- Pain: kermit tylenol, dialudid and oxy prn  -- GCS 7T: E2, V1T, M4; exam stable  -- Neurology following; appreciate recs  -- Pending MRI             Cards:   -- HDS  -- goal SBP < 180, MAP >65  -- hypertensive, hydralazine and labetalol prns  -- Continue amlodipine 5mg, will increase to 10mg tomorrow if still requiring multiple prns      Pulm:   #Acute Respiratory Failure  -- Intubated  -- Goal O2 sat > 90%  -- Daily SBT      Renal:  #ALVARADO on CKD  Received HD 2/1. ATN.   -- Nephrology following; appreciate recs  -- RRT as needed per nephrology  -- Keep meza for strict I/O  -- Stool output 250  -- Urine output: <50cc  Recent Labs   Lab 02/04/24  0317 02/04/24  1510 02/05/24  0321   BUN 54* 42* 52*  52*   CREATININE 5.2* 4.3* 5.2*  5.2*        FEN / GI:   -- Daily CMPs  -- NGT in place   -- Replace lytes as needed  -- Nutrition: NPO, TF (Novasource Renal) at goal 30 ml/hr  -- GI PPX   -- Nutrition following; appreciate recs  -- Dc miralax; started metamucil       ID:    #Ayaz's gangrene  s/p OR debridement for nec fascitis. R groin tissue with proteus, sensitive to ceftriaxone  -- Abx: ceftriaxone and flagyl  -- ID following ; appreciate recs  -- Debridement and wound vac change planned for 2/6  Recent Labs   Lab 02/03/24  0327 02/04/24  0317 02/05/24  0321   WBC 27.03* 25.10* 29.32*        Heme/Onc:  -- Daily CBC  -- Heparin DVT ppx  Recent Labs   Lab 02/01/24  2246 02/02/24  0335 02/03/24  0327 02/04/24  0317 02/05/24  0321   HGB  --    < > 8.5* 7.9* 8.3*   PLT  --    < > 241 205 227   APTT 27.8  --   --   --   --    INR 1.0  --   --   --   --     < > = values in this interval not displayed.        Endo:   #IDDM  Initially presented to OSH with DKA with latest A1C 10.4 AG Gap resolved  - Increasing insulin  prn requirements since starting tube feeds  - Placed on insulin gtt 2/3: Transition IV insulin infusion at 1.8 u/hr with stepdown parameters (0.5 u/kg dosing)   - Increased Novolog to 10 units units q 4 hrs while on Tube feeds (HOLD if tube feeds are stopped or BG < 100)  - Moderate dose SSI  - Endocrine following, appreciate recs        PPx:   Feeding: NPO, TF @ 30  Analgesia/Sedation: See above  Thromboembolic prevention: SQH   HOB >30: Y  Stress Ulcer ppx: Famotidine  Glucose control: Critical care goal 140-180 g/dl, Insulin gtt, kermit novolog, SSI, Endo following  Bowel reg: N/A  Invasive Lines/Drains/Airway: Glynn, ETT, Art line, Trialysis, PIV x2, Rectal tube      Dispo/Code Status/Palliative:   -- SICU / Full Code

## 2024-02-05 NOTE — ASSESSMENT & PLAN NOTE
Transfer from [facility]. Admitted for fourniers gangrene. Initiated HD on 1/31. ALVARADO 2/2 ATN in setting of septic shock. Arrived with CR 3.8. Unknown baseline.  Plan to OR today. Net -1.3L.OR today for debridement with possible closure and wound vac placement     ALVARADO most likley ATN in setting of septic shock     Plan/Recommendation  -will plan for 10 hour SLED tonight for metabolic clearance/volume optimization.  --400 cc/hr as tolerated with goal to be negative 1-2L/24h.  -Will eval RRT needs daily   -Daily RFP   -Strict I&Os  -Avoid nephrotoxins, if possible

## 2024-02-05 NOTE — PROGRESS NOTES
[facility] - Surgical Intensive Care  Nephrology  Progress Note    Patient Name: Sarah Saravia  MRN: [id number]  Admission Date: 1/29/2024  Hospital Length of Stay: 7 days  Attending Provider: Steve Cole MD   Primary Care Physician: [facility]  Principal Problem:Ayaz's gangrene    Subjective:     HPI: Sarah Saravia is a 53 year old female with a past medical history of obesity (BMI 53) admitted with N/V and R groin wound found to be in DKA at OSH.  She underwent a CT abdomen and pelvis that showed extensive soft tissue air throughout the right groin and inguinal region and extending to involve the right lower quadrant anterior abdominal wall with extensive soft tissue air also seen involving the proximal medial aspect of the right thigh, concerning for gas-forming infection. She is s/p OR debridement for necrotizing fascitis on 1/29/24 and take back for 1/31/24. Right groin tissue growing proteus, susceptibilities still pending. Currently on meropenem and clindamycin which was d/c'd on 1/31/24. While at OSH pt developed acute respiratory failure requiring intubation. Nephrology was consulted for worsening alvarado in the setting of lactic acidosis and septic shock likely ATN, sCr elevated at 3.8 on admit.  OR today for debridement with possible closure and wound vac placement. Last HD 2/1. Net -1.3L. Electrolytes stable. Nephrology consulted for ALVARADO.     Interval History:   RRT held yesterday, net positive 500 ml/24h.  UOP of 160 ml.  Electrolytes non-emergent.  HDS off pressors, Minimal hourly intake.      Review of patient's allergies indicates:  No Known Allergies  Current Facility-Administered Medications   Medication Frequency    0.9%  NaCl infusion PRN    acetaminophen tablet 650 mg Q6H    albuterol-ipratropium 2.5 mg-0.5 mg/3 mL nebulizer solution 3 mL Q4H PRN    [START ON 2/6/2024] amLODIPine tablet 10 mg Daily    cefTRIAXone (ROCEPHIN) 2 g in dextrose 5 % in water (D5W) 100  mL IVPB (MB+) Q24H    dextrose 10 % infusion Continuous PRN    dextrose 10% bolus 125 mL 125 mL PRN    dextrose 10% bolus 250 mL 250 mL PRN    famotidine tablet 20 mg Daily    glucagon (human recombinant) injection 1 mg PRN    glucose chewable tablet 16 g PRN    glucose chewable tablet 24 g PRN    heparin (porcine) injection 7,500 Units Q8H    hydrALAZINE injection 10 mg Q4H PRN    HYDROmorphone injection 0.5 mg Q4H PRN    insulin aspart U-100 pen 0-10 Units Q4H PRN    insulin aspart U-100 pen 14 Units Q4H    insulin regular in 0.9 % NaCl 100 unit/100 mL (1 unit/mL) infusion Continuous    labetalol 20 mg/4 mL (5 mg/mL) IV syring Q6H PRN    melatonin tablet 6 mg Nightly PRN    metronidazole IVPB 500 mg Q8H    naloxone 0.4 mg/mL injection 0.02 mg PRN    ondansetron injection 4 mg Q6H PRN    oxyCODONE 5 mg/5 mL solution 10 mg Q6H PRN    oxyCODONE 5 mg/5 mL solution 5 mg Q6H PRN    prochlorperazine injection Soln 5 mg Q6H PRN    psyllium husk (aspartame) 3.4 gram packet 1 packet BID    sodium chloride 0.9% bolus 250 mL 250 mL PRN    sodium chloride 0.9% bolus 250 mL 250 mL PRN    sodium chloride 0.9% flush 10 mL PRN       Objective:     Vital Signs (Most Recent):  Temp: 98.3 °F (36.8 °C) (02/05/24 1101)  Pulse: 99 (02/05/24 1101)  Resp: 16 (02/05/24 1101)  BP: (!) 159/70 (02/05/24 1100)  SpO2: 100 % (02/05/24 1101) Vital Signs (24h Range):  Temp:  [98.2 °F (36.8 °C)-98.4 °F (36.9 °C)] 98.3 °F (36.8 °C)  Pulse:  [] 99  Resp:  [14-29] 16  SpO2:  [97 %-100 %] 100 %  BP: (142-175)/(65-94) 159/70  Arterial Line BP: (138-183)/(60-83) 167/72     Weight: (!) 165.3 kg (364 lb 6.7 oz) (02/05/24 0300)  Body mass index is 58.85 kg/m².  Body surface area is 2.77 meters squared.    I/O last 3 completed shifts:  In: 2969.4 [I.V.:1030.3; NG/GT:720; IV Piggyback:1219]  Out: 2920 [Urine:180; Drains:70; Other:2320; Stool:350]     Physical Exam  Constitutional:       General: She is not in acute distress.     Appearance: She is  obese. She is not ill-appearing or toxic-appearing.      Interventions: She is sedated and intubated.   Eyes:      General: No scleral icterus.        Right eye: No discharge.         Left eye: No discharge.   Cardiovascular:      Rate and Rhythm: Normal rate.   Pulmonary:      Effort: She is intubated.      Comments: Vent Mode: A/C  Oxygen Concentration (%):  [40] 40  Resp Rate Total:  [19 br/min-32 br/min] 22 br/min  Vt Set:  [420 mL] 420 mL  PEEP/CPAP:  [5 cmH20] 5 cmH20  Mean Airway Pressure:  [6.5 doF97-49 cmH20] 11 cmH20      Abdominal:      General: There is distension.   Musculoskeletal:      Right lower leg: Edema present.      Left lower leg: Edema present.          Significant Labs:  CBC:   Recent Labs   Lab 02/05/24  0321   WBC 29.32*   RBC 3.19*   HGB 8.3*   HCT 24.6*      MCV 77*   MCH 26.0*   MCHC 33.7     CMP:   Recent Labs   Lab 02/05/24  0321   *  227*   CALCIUM 8.2*  8.1*   ALBUMIN 1.6*  1.6*   PROT 6.8     135*   K 3.9  3.8   CO2 19*  20*     105   BUN 52*  52*   CREATININE 5.2*  5.2*   ALKPHOS 150*   ALT 15   AST 30   BILITOT 0.3        Assessment/Plan:     Renal/  * Ayaz's gangrene  -management per primary     ALVARADO (acute kidney injury)  Transfer from [facility]. Admitted for fourniers gangrene. Initiated HD on 1/31. ALVARADO 2/2 ATN in setting of septic shock. Arrived with CR 3.8. Unknown baseline.  Plan to OR today. Net -1.3L.OR today for debridement with possible closure and wound vac placement     ALVARADO most likley ATN in setting of septic shock     Plan/Recommendation  -will plan for 10 hour SLED tonight for metabolic clearance/volume optimization.  --400 cc/hr as tolerated with goal to be negative 1-2L/24h.  -Will eval RRT needs daily   -Daily RFP   -Strict I&Os  -Avoid nephrotoxins, if possible       Axel Elizabeth NP  Nephrology  [facility]

## 2024-02-05 NOTE — SUBJECTIVE & OBJECTIVE
Interval History/Significant Events: Wound vacuum became dirty so was taken out and rectal tube placed. Currently on Psupport 10/5.     Follow-up For: Procedure(s) (LRB):  DEBRIDEMENT, WOUND (Bilateral)    Post-Operative Day: 3 Days Post-Op    Objective:     Vital Signs (Most Recent):  Temp: 98.4 °F (36.9 °C) (02/05/24 0300)  Pulse: 99 (02/05/24 0615)  Resp: 16 (02/05/24 0615)  BP: (!) 156/69 (02/05/24 0600)  SpO2: 100 % (02/05/24 0615) Vital Signs (24h Range):  Temp:  [98.2 °F (36.8 °C)-98.4 °F (36.9 °C)] 98.4 °F (36.9 °C)  Pulse:  [] 99  Resp:  [10-29] 16  SpO2:  [97 %-100 %] 100 %  BP: (137-175)/(65-88) 156/69  Arterial Line BP: (138-183)/(60-83) 152/68     Weight: (!) 165.3 kg (364 lb 6.7 oz)  Body mass index is 58.85 kg/m².      Intake/Output Summary (Last 24 hours) at 2/5/2024 0731  Last data filed at 2/5/2024 0600  Gross per 24 hour   Intake 1265.6 ml   Output 848 ml   Net 417.6 ml          Physical Exam  Vitals reviewed.   Constitutional:       Appearance: She is obese.   HENT:      Head: Normocephalic.      Right Ear: Tympanic membrane normal.      Nose: Nose normal.      Mouth/Throat:      Mouth: Mucous membranes are moist.   Eyes:      Pupils: Pupils are equal, round, and reactive to light.   Cardiovascular:      Rate and Rhythm: Normal rate and regular rhythm.   Pulmonary:      Effort: Pulmonary effort is normal.      Breath sounds: Normal breath sounds.   Abdominal:      General: Abdomen is flat.      Palpations: Abdomen is soft.   Genitourinary:     General: Normal vulva.   Musculoskeletal:         General: Normal range of motion.   Skin:     General: Skin is warm.      Capillary Refill: Capillary refill takes less than 2 seconds.   Neurological:      Mental Status: She is alert.      Comments: On the vent   Psychiatric:         Mood and Affect: Mood normal.            Vents:  Vent Mode: Spont (02/05/24 0350)  Set Rate: 18 BPM (02/04/24 1151)  Vt Set: 420 mL (02/04/24 1151)  Pressure Support: 10  cmH20 (02/05/24 0350)  PEEP/CPAP: 5 cmH20 (02/05/24 0350)  Oxygen Concentration (%): 40 (02/05/24 0615)  Peak Airway Pressure: 16 cmH20 (02/05/24 0350)  Plateau Pressure: 15 cmH20 (02/05/24 0350)  Total Ve: 9.54 L/m (02/05/24 0350)  Negative Inspiratory Force (cm H2O): 0 (02/05/24 0350)  F/VT Ratio<105 (RSBI): (!) 34.66 (02/05/24 0350)    Lines/Drains/Airways       Central Venous Catheter Line  Duration             Trialysis (Dialysis) Catheter 01/31/24 1051 right internal jugular 4 days              Drain  Duration                  Urethral Catheter 01/29/24 2315 6 days         NG/OG Tube 02/01/24 2250 18 Fr. Center mouth 3 days         Rectal Tube 02/04/24 1545 <1 day              Airway  Duration                  Airway - Non-Surgical 01/31/24 1020 4 days              Arterial Line  Duration             Arterial Line 01/31/24 1025 Right Radial 4 days              Peripheral Intravenous Line  Duration                  Peripheral IV - Single Lumen 02/04/24 2241 20 G Right Antecubital <1 day                    Significant Labs:    CBC/Anemia Profile:  Recent Labs   Lab 02/04/24  0317 02/05/24  0321   WBC 25.10* 29.32*   HGB 7.9* 8.3*   HCT 22.7* 24.6*    227   MCV 76* 77*   RDW 14.6* 15.1*        Chemistries:  Recent Labs   Lab 02/04/24  0317 02/04/24  1510 02/05/24  0321    136 136  135*   K 3.6 3.7 3.9  3.8    105 106  105   CO2 20* 19* 19*  20*   BUN 54* 42* 52*  52*   CREATININE 5.2* 4.3* 5.2*  5.2*   CALCIUM 7.7* 8.0* 8.2*  8.1*   ALBUMIN 1.5* 1.7* 1.6*  1.6*   PROT  --   --  6.8   BILITOT  --   --  0.3   ALKPHOS  --   --  150*   ALT  --   --  15   AST  --   --  30   MG 2.2 2.0 2.1   PHOS 3.9  3.9 3.5 4.3  4.3           Significant Imaging:  N/A

## 2024-02-05 NOTE — PROGRESS NOTES
"[facility]  Endocrinology  Progress Note    Admit Date: 1/29/2024     Reason for Consult: Management of T2DM, Hyperglycemia     Surgical Procedure and Date: n/a    Diabetes diagnosis year: new onset     Home Diabetes Medications:  none per chart review and family present at bedside     Lab Results   Component Value Date    HGBA1C 10.4 (H) 01/30/2024       Diabetes Complications include:     Hyperglycemia, DKA at OSH     Complicating diabetes co morbidities:   Obesity       HPI:   Patient is a 53 y.o. female with a diagnosis of obesity (BMI 53) admitted with N/V and R groin wound found to be in DKA at OSH. 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 While at OSH pt developed acute respiratory failure requiring intubation. Pulmonology was consulted for ventilator management and critical illness. Nephrology was consulted for worsening vishal in the setting of lactic acidosis and septic shock likely ATN, sCr elevated at 3.8 on admit now 4.0 post HD. Pt being admitted to SICU for surgical critical care management. Immediate plans include hemodynamic stabilization, weaning of respiratory support, and RRT.  Endocrinology consulted for management of T2DM.                 Interval HPI:   Overnight events: Remains intubated in SICU. POD 3. BG remains above goal ranges on current IV/SQ insulin regimen. Plans for OR chris for wound vac exchange. Creatinine 5.2. Diet NPO    Eating:   NPO  Nausea: No  Hypoglycemia and intervention: No  Fever: No  TPN and/or TF: Yes  If yes, type of TF/TPN and rate: Tube feeds at 30 ml/hr     BP (!) 169/73   Pulse 98   Temp 98.4 °F (36.9 °C) (Oral)   Resp 20   Ht 5' 5.98" (1.676 m)   Wt (!) 165.3 kg (364 lb 6.7 oz)   LMP 01/01/2024 (Approximate) Comment: tubal ligation  SpO2 100%   BMI 58.85 kg/m²     Labs Reviewed and Include    Recent Labs   Lab 02/05/24  0321   *  " "227*   CALCIUM 8.2*  8.1*   ALBUMIN 1.6*  1.6*   PROT 6.8     135*   K 3.9  3.8   CO2 19*  20*     105   BUN 52*  52*   CREATININE 5.2*  5.2*   ALKPHOS 150*   ALT 15   AST 30   BILITOT 0.3     Lab Results   Component Value Date    WBC 29.32 (H) 02/05/2024    HGB 8.3 (L) 02/05/2024    HCT 24.6 (L) 02/05/2024    MCV 77 (L) 02/05/2024     02/05/2024     No results for input(s): "TSH", "FREET4" in the last 168 hours.  Lab Results   Component Value Date    HGBA1C 10.4 (H) 01/30/2024       Nutritional status:   Body mass index is 58.85 kg/m².  Lab Results   Component Value Date    ALBUMIN 1.6 (L) 02/05/2024    ALBUMIN 1.6 (L) 02/05/2024    ALBUMIN 1.7 (L) 02/04/2024     No results found for: "PREALBUMIN"    Estimated Creatinine Clearance: 20.1 mL/min (A) (based on SCr of 5.2 mg/dL (H)).    Accu-Checks  Recent Labs     02/03/24 2014 02/04/24  0025 02/04/24  0425 02/04/24  0756 02/04/24  1213 02/04/24  1514 02/04/24  2004 02/05/24  0014 02/05/24  0403 02/05/24  0759   POCTGLUCOSE 376* 318* 311* 228* 263* 206* 238* 229* 237* 251*       Current Medications and/or Treatments Impacting Glycemic Control  Immunotherapy:    Immunosuppressants       None          Steroids:   Hormones (From admission, onward)      Start     Stop Route Frequency Ordered    01/30/24 0114  melatonin tablet 6 mg         -- Oral Nightly PRN 01/30/24 0016          Pressors:    Autonomic Drugs (From admission, onward)      None          Hyperglycemia/Diabetes Medications:   Antihyperglycemics (From admission, onward)      Start     Stop Route Frequency Ordered    02/05/24 1200  insulin aspart U-100 pen 14 Units         -- SubQ Every 4 hours 02/05/24 0822    02/03/24 1915  insulin regular in 0.9 % NaCl 100 unit/100 mL (1 unit/mL) infusion        Question:  Enter initial dose (Units/hr):  Answer:  1.8    -- IV Continuous 02/03/24 1812 02/03/24 1010  insulin aspart U-100 pen 0-10 Units         -- SubQ Every 4 hours PRN 02/03/24 " 0911            ASSESSMENT and PLAN    Renal/  * Ayaz's gangrene  Managed per primary team  Optimize BG control        ALVARADO (acute kidney injury)  Lab Results   Component Value Date    CREATININE 5.2 (H) 02/05/2024    CREATININE 5.2 (H) 02/05/2024     Avoid insulin stacking  Titrate insulin slowly       Endocrine  Morbid (severe) obesity due to excess calories  Body mass index is 58.85 kg/m².  May increase insulin resistance.         New onset type 2 diabetes mellitus  BG goal 140-180    No previous hx of T2DM per family at bedside. A1c of 10.4. DKA at OSH.     Plan:  -Increase Transition IV insulin infusion to 2.2 u/hr with stepdown parameters (20% dose increase)   -Increase Novolog to 14 units q 4 hrs while on tube feeding (HOLD if tube feeding is stopped or BG < 100) 20% dose increase; still requiring frequent prn SQ insulin correction   -Continue Moderate Dose Correction Scale  -BG monitoring q 4 hrs while NPO     ** Please call Endocrine for any BG related issues **      Discharge plans: TBD    Lab Results   Component Value Date    HGBA1C 10.4 (H) 01/30/2024             Zoie Muñiz, NP  Endocrinology  [facility]

## 2024-02-05 NOTE — PLAN OF CARE
[facility]  Discharge Reassessment    Primary Care Provider: [facility]    Expected Discharge Date: 2/16/2024    Reassessment (most recent)       Discharge Reassessment - 02/05/24 1132          Discharge Reassessment    Assessment Type Discharge Planning Reassessment     Did the patient's condition or plan change since previous assessment? Yes     Discharge Plan discussed with: Spouse/sig other;Adult children     Communicated ZEKE with patient/caregiver Date not available/Unable to determine     Discharge Plan A Long-term acute care facility (LTAC)     Discharge Plan B Long-term acute care facility (LTAC)     DME Needed Upon Discharge  none     Transition of Care Barriers Underinsured     Why the patient remains in the hospital Requires continued medical care        Post-Acute Status    Discharge Delays None known at this time                 Patient remains in SICU and intubated and on the vent

## 2024-02-05 NOTE — PLAN OF CARE
VSS throughout shift. Patient arouses to pain, does not withdraw. NSR-sinus tachy. SBPs 140s-170s, PRN hydralazine. Vent, FiO2 50%, remains on spontaneous. TF @ goal, 30 cc/hr. Rectal tube with 100 cc output. Groin wound vac seal intact. CRRT Glynn catheter removed. Insulin gtt increased to 2.2. 24 hr EEG monitoring complete, awaiting availability for MRI. Plan for OR tomorrow for wound vac replacement and debridement. Patient turned Q 2 hrs, foam dressings in place. CRRT started, will get 10 hrs SLED overnight. Patient and family members updated on plan of care, all questions and concerns addressed.

## 2024-02-05 NOTE — PLAN OF CARE
VSS throughout shift. Patient withdraws to pain in upper and lower extremities. NSR. SBPs maintained <180 with PRN hydralazine. Vent, FiO2 50%, SBT throughout day, tolerating well. TF @ goal, 30 cc/hr. Rectal tube placed for large liquid bowel movements. Groin wound vac changed due to feces leaking through dressing. CRRT clotted off this am, cathflow instilled, lines now working. 95 cc UOP. Insulin gtt @ 1.8. 24 hr EEG monitoring placed at 1600, plan for MRI when completed. Patient and family members updated on plan of care, all questions and concerns addressed.

## 2024-02-05 NOTE — PROGRESS NOTES
[facility]  Infectious Disease  Progress Note    Patient Name: Sarah Saravia  MRN: [id number]  Admission Date: 1/29/2024  Length of Stay: 7 days  Attending Physician: Steve Cole MD  Primary Care Provider: [facility]    Isolation Status: No active isolations  Assessment/Plan:      Neuro  Encephalopathy  Not withdrawing to pain on 2/5.   Will follow up MRI ordered by primary.  Other management per primary.    Renal/  * Ayaz's gangrene  I have reviewed hospital notes from  SICU service and other specialty providers. I have also reviewed CBC, CMP/BMP,  cultures and imaging with my interpretation as documented.     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 Operative cultures showing P mirabilis. She is afebrile, and with ongoing leukocytosis.   Continue ceftriaxone 2 gm IV daily.  Continue metronidazole.   Will follow up culture results.  Surgical debridements as indicated for non viable tissue as per Surgical Service.  Discussed with the patients daughter and boyfriend at bedside.  Discussed management plan with the staff and/or members from SICU and General Surgery service.           Anticipated Disposition: per primary    Thank you for your consult. I will follow-up with patient. Please contact us if you have any additional questions.    Devika Andujar MD  Infectious Disease  [facility]      50 minutes of total time spent on the encounter, which includes face to face time and non-face to face time preparing to see the patient (eg, review of tests), obtaining and/or reviewing separately obtained history, documenting clinical information in the electronic or other health record, independently interpreting results (not separately reported) and communicating results to the patient/family/caregiver, or care coordination (not separately reported).     Subjective:     Principal Problem:Ayaz's gangrene    HPI: A 53-year-old  woman with morbid obesity as evidenced by a BMI of 50 3 (documented as 58 at [facility]) who originally presented to [facility] with a wound to her posterior thigh, vomiting, and diarrhea for 3-4 days prior to admission.  On evaluation in [facility] ED she was noted to be afebrile tachycardic, and hypertensive.  Laboratory workup at that time revealed leukocytosis, elevated creatinine, elevated lactic acid, and elevated CRP.  Additionally it showed hyperglycemia with anion gap acidosis consistent with diabetic ketoacidosis.  CT imaging of the abdomen showed extensive soft tissue air throughout the right groin and inguinal region extending to the right lower quadrant of the anterior abdominal wall and medial aspect of the right thigh concerning for gas-forming infection.  She was admitted to hospital medicine service and taken to the OR by General surgery for debridement.  Empiric antibiotics with Zosyn, clindamycin, and vancomycin was started.  She underwent incision and drainage with debridement of the soft tissues down to the muscular fascia on 01/29.  She was then subsequently taken back to the OR on 01/31 where she underwent irrigation and debridement of the wound and insertion of a triple-lumen temporary hemodialysis catheter.  She was subsequently transitioned to meropenem and clindamycin while in [facility].  Unfortunately her hospital course was complicated by acute respiratory failure requiring intubation with mechanical ventilation and worsening ALVARADO prompting initiation of renal replacement therapy.  She was transferred to [facility] for higher level of care.  Upon transfer the patient's antibiotic therapy was deescalated to ceftriaxone.    Infectious disease is consulted for Antibiotic management: currently on merem, concern for nec fasciitis          Interval History: ".  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 Taken to OR on 2/2 for  debridement of the right buttocks and groin (#3). Evaluated by Neurology on 2/3 for encephalopathy. CT head with remote infarcts. Not withdrawing to pain on the morning of 2/5.    Review of Systems   Unable to perform ROS: Intubated     Objective:     Vital Signs (Most Recent):  Temp: 98.4 °F (36.9 °C) (02/05/24 0300)  Pulse: 99 (02/05/24 1015)  Resp: (!) 21 (02/05/24 1015)  BP: (!) 169/73 (02/05/24 1000)  SpO2: 100 % (02/05/24 1015) Vital Signs (24h Range):  Temp:  [98.2 °F (36.8 °C)-98.4 °F (36.9 °C)] 98.4 °F (36.9 °C)  Pulse:  [] 99  Resp:  [11-29] 21  SpO2:  [97 %-100 %] 100 %  BP: (137-175)/(65-94) 169/73  Arterial Line BP: (138-183)/(60-83) 179/73     Weight: (!) 165.3 kg (364 lb 6.7 oz)  Body mass index is 58.85 kg/m².    Estimated Creatinine Clearance: 20.1 mL/min (A) (based on SCr of 5.2 mg/dL (H)).     Physical Exam  Vitals and nursing note reviewed.   Constitutional:       Appearance: She is well-developed. She is ill-appearing.      Interventions: She is intubated.   HENT:      Head: Normocephalic and atraumatic.      Right Ear: External ear normal.      Left Ear: External ear normal.   Eyes:      General: No scleral icterus.        Right eye: No discharge.         Left eye: No discharge.      Conjunctiva/sclera: Conjunctivae normal.   Cardiovascular:      Rate and Rhythm: Normal rate and regular rhythm.      Heart sounds: Normal heart sounds. No murmur heard.     No friction rub. No gallop.   Pulmonary:      Effort: Pulmonary effort is normal. No respiratory distress. She is intubated.      Breath sounds: No stridor. Rhonchi present. No wheezing or rales.   Abdominal:      General: Bowel sounds are normal.   Musculoskeletal:         General: No tenderness.   Skin:     General: Skin is warm and dry.      Comments: Wound VAC on right groin area   Neurological:      Mental Status: She is lethargic.      Comments: Does not respond to verbal, tactile or noxious stimuli. Does not withdraw to painful  stimuli.          Significant Labs: BMP:   Recent Labs   Lab 02/05/24  0321   *  227*     135*   K 3.9  3.8     105   CO2 19*  20*   BUN 52*  52*   CREATININE 5.2*  5.2*   CALCIUM 8.2*  8.1*   MG 2.1       CBC:   Recent Labs   Lab 02/04/24  0317 02/05/24  0321   WBC 25.10* 29.32*   HGB 7.9* 8.3*   HCT 22.7* 24.6*    227       Microbiology Results (last 7 days)       Procedure Component Value Units Date/Time    Culture, Anaerobe [[id number]] Collected: 01/30/24 0228    Order Status: Completed Specimen: Wound from Groin Updated: 02/04/24 0840     Anaerobic Culture Culture in progress    Narrative:      Right groin tissue    Culture, Anaerobe [[id number]] Collected: 01/30/24 0219    Order Status: Completed Specimen: Abscess from Groin Updated: 02/04/24 0840     Anaerobic Culture Culture in progress    Narrative:      Right groin abcess    Blood culture x two cultures. Draw prior to antibiotics. [[id number]] Collected: 01/29/24 2118    Order Status: Completed Specimen: Blood from Peripheral, Antecubital, Left Updated: 02/02/24 2303     Blood Culture, Routine No Growth after 4 days.    Narrative:      Aerobic and anaerobic    Blood culture x two cultures. Draw prior to antibiotics. [[id number]] Collected: 01/29/24 1929    Order Status: Completed Specimen: Blood from Peripheral, Antecubital, Right Updated: 02/02/24 2103     Blood Culture, Routine No Growth after 4 days.    Narrative:      Aerobic and anaerobic    Aerobic culture [[id number]]  (Abnormal)  (Susceptibility) Collected: 01/30/24 0228    Order Status: Completed Specimen: Wound from Groin Updated: 02/02/24 0746     Aerobic Bacterial Culture PROTEUS MIRABILIS  Moderate      Narrative:      Right groin tissue    Aerobic culture [[id number]]  (Abnormal)  (Susceptibility) Collected: 01/30/24 0219    Order Status: Completed Specimen: Abscess from Groin Updated: 02/02/24 0746     Aerobic Bacterial Culture PROTEUS MIRABILIS  Moderate       Narrative:      Right groin abcess    AFB Culture & Smear [[id number]] Collected: 01/30/24 0219    Order Status: Completed Specimen: Abscess from Groin Updated: 01/31/24 2127     AFB Culture & Smear Culture in progress     AFB CULTURE STAIN No acid fast bacilli seen.    Narrative:      Right groin abcess    AFB Culture & Smear [[id number]] Collected: 01/30/24 0228    Order Status: Completed Specimen: Wound from Groin Updated: 01/31/24 2127     AFB Culture & Smear Culture in progress     AFB CULTURE STAIN No acid fast bacilli seen.    Narrative:      Right groin tissue    Gram stain [[id number]] Collected: 01/30/24 0228    Order Status: Completed Specimen: Wound from Groin Updated: 01/30/24 1035     Gram Stain Result Moderate Gram positive cocci in pairs and chains      No WBC's    Narrative:      Right groin tissue    Gram stain [[id number]] Collected: 01/30/24 0219    Order Status: Completed Specimen: Abscess from Groin Updated: 01/30/24 1035     Gram Stain Result Moderate Gram positive cocci in pairs and chains      No WBC's    Narrative:      Right groin abcess    Fungus culture [[id number]] Collected: 01/30/24 0219    Order Status: Sent Specimen: Abscess from Groin Updated: 01/30/24 0247    Fungus culture [[id number]] Collected: 01/30/24 0228    Order Status: Sent Specimen: Wound from Groin Updated: 01/30/24 0243    AFB Culture & Smear [[id number]] Collected: 01/30/24 0058    Order Status: Sent Specimen: Abscess from Groin Updated: 01/30/24 0058    AFB Culture & Smear [[id number]] Collected: 01/29/24 1115    Order Status: Sent Specimen: Abscess from Groin Updated: 01/30/24 0019    AFB Culture & Smear [[id number]] Collected: 01/29/24 1115    Order Status: Sent Specimen: Abscess from Groin Updated: 01/30/24 0019    Fungus culture [[id number]] Collected: 01/29/24 1115    Order Status: Canceled Specimen: Abscess from Groin     Gram stain [[id number]] Collected: 01/29/24 1115    Order Status: Canceled Specimen:  Abscess from Groin     Culture, Anaerobe [[id number]] Collected: 01/29/24 1115    Order Status: Canceled Specimen: Abscess from Groin     Aerobic culture [[id number]] Collected: 01/29/24 1115    Order Status: Canceled Specimen: Abscess from Groin     Culture, Anaerobe [[id number]] Collected: 01/29/24 1115    Order Status: Canceled Specimen: Abscess from Groin     Aerobic culture [[id number]] Collected: 01/29/24 1115    Order Status: Canceled Specimen: Abscess from Groin     Gram stain [[id number]] Collected: 01/29/24 1115    Order Status: Canceled Specimen: Abscess from Groin     Fungus culture [[id number]] Collected: 01/29/24 1115    Order Status: Canceled Specimen: Abscess from Groin             Significant Imaging: I have reviewed all pertinent imaging results/findings within the past 24 hours.

## 2024-02-05 NOTE — PROGRESS NOTES
[facility]  Neurology  Progress Note    Patient Name: Sarah Saravia  MRN: [id number]  Admission Date: 1/29/2024  Hospital Length of Stay: 7 days  Code Status: Full Code   Attending Provider: Steve Cole MD  Primary Care Physician: Patricia [facility]   Principal Problem:Ayaz's gangrene    HPI:   53F w/ hx of obesity who presented to OSH w/ nausea and vomiting, right groin wound, found to be in DKA and acute renal failure on admit. CT abdomen/pelvis w/extensive soft tissue air throughout the right groin and inguinal region extending to the RLQ, anterior abdominal wall, right proximal/medial thigh w/extensive soft tissue air concerning for gas-forming infection. S/p OR debridement for necrotizing fasciitis on 1/29 and 1/31: cx growing proteus. After taken to OR patient remained intubated. CTH on 2/3 with hypoattenuation along left anterior temporal lobe, bilateral centrum semiovale, left anterior corpus callosum, right caudate, subinsular region which may indicate prior infarcts. Previously on cefepime (off 12/31), meropenem (off on 2/2), now on ceftriaxone and flagyl. Fentanyl and propofol dc'ed on 2/2. Neurology is consulted for persistent encephalopathy off sedation.    Overview/Hospital Course:  EEG w/ severe slowing; no seizures. Returning to OR 2/6.        Subjective:     Interval History: Neuro exam largely unchanged. To return to OR tomorrow.    Current Facility-Administered Medications   Medication Dose Route Frequency Provider Last Rate Last Admin    0.9%  NaCl infusion   Intravenous PRN Jailene Worthy MD        acetaminophen tablet 650 mg  650 mg Per NG tube Q6H Wilian Soria MD   650 mg at 02/05/24 0503    albuterol-ipratropium 2.5 mg-0.5 mg/3 mL nebulizer solution 3 mL  3 mL Nebulization Q4H PRN Hector Sam MD        [START ON 2/6/2024] amLODIPine tablet 10 mg  10 mg Per OG tube Daily David Stokes MD        calcium gluconate 3 g in dextrose 5 % (D5W)  100 mL infusion   Intravenous Continuous Axel Haynes NP        cefTRIAXone (ROCEPHIN) 2 g in dextrose 5 % in water (D5W) 100 mL IVPB (MB+)  2 g Intravenous Q24H Chirag Borges MD   Stopped at 02/05/24 1027    dextrose 10 % infusion   Intravenous Continuous PRN Zoie Muñiz NP        dextrose 10% bolus 125 mL 125 mL  12.5 g Intravenous PRN Zoie Muñiz NP        dextrose 10% bolus 250 mL 250 mL  25 g Intravenous PRN Zoie Muñiz NP        dextrose-sod citrate-citric ac 2.45-2.2 gram- 800 mg/100 mL Soln   Intra-Catheter Continuous Axel Haynes NP        famotidine tablet 20 mg  20 mg Per OG tube Daily David Stokes MD   20 mg at 02/05/24 0956    glucagon (human recombinant) injection 1 mg  1 mg Intramuscular PRN Zoie Muñiz NP        glucose chewable tablet 16 g  16 g Oral PRN Zoie Muñiz NP        glucose chewable tablet 24 g  24 g Oral PRN Zoie Muñiz NP        heparin (porcine) injection 7,500 Units  7,500 Units Subcutaneous Q8H Chirag Borges MD   7,500 Units at 02/05/24 1338    hydrALAZINE injection 10 mg  10 mg Intravenous Q4H PRN Carlos Acuna MD   10 mg at 02/05/24 1120    HYDROmorphone injection 0.5 mg  0.5 mg Intravenous Q4H PRN Bree Lewis, DNP        insulin aspart U-100 pen 0-10 Units  0-10 Units Subcutaneous Q4H PRN Zoie Muñiz NP   4 Units at 02/05/24 1138    insulin aspart U-100 pen 14 Units  14 Units Subcutaneous Q4H Zoie Muñiz NP   14 Units at 02/05/24 1138    insulin regular in 0.9 % NaCl 100 unit/100 mL (1 unit/mL) infusion  2.2 Units/hr Intravenous Continuous Zoie Muñiz NP 1.8 mL/hr at 02/05/24 1101 1.8 Units/hr at 02/05/24 1101    labetalol 20 mg/4 mL (5 mg/mL) IV syring  10 mg Intravenous Q6H PRN Chirag Borges MD   10 mg at 02/02/24 1440    magnesium sulfate 2g in water 50mL IVPB (premix)  2 g Intravenous PRN Axel Haynes NP        melatonin tablet 6 mg  6 mg Oral Nightly PRN Hector Sam MD        metronidazole  IVPB 500 mg  500 mg Intravenous Q8H Devika Falcon MD   Stopped at 02/05/24 1211    naloxone 0.4 mg/mL injection 0.02 mg  0.02 mg Intravenous PRN Suzanne Woo MD        ondansetron injection 4 mg  4 mg Intravenous Q6H PRN Hector Sam MD        oxyCODONE 5 mg/5 mL solution 10 mg  10 mg Per OG tube Q6H PRN Bree Lewis DNP   10 mg at 02/05/24 0954    oxyCODONE 5 mg/5 mL solution 5 mg  5 mg Per OG tube Q6H PRN Bree Lewis DNP        prochlorperazine injection Soln 5 mg  5 mg Intravenous Q6H PRN Hector Sam MD        psyllium husk (aspartame) 3.4 gram packet 1 packet  1 packet Per OG tube BID David Stokes MD   1 packet at 02/05/24 0955    sodium chloride 0.9% bolus 250 mL 250 mL  250 mL Intravenous PRN Jailene Worthy MD        sodium chloride 0.9% bolus 250 mL 250 mL  250 mL Intravenous PRN Jailene Worthy MD        sodium chloride 0.9% flush 10 mL  10 mL Intravenous PRN Hector Sam MD        sodium phosphate 20.01 mmol in dextrose 5 % (D5W) 250 mL IVPB  20.01 mmol Intravenous PRN Axel Haynes, NP        sodium phosphate 30 mmol in dextrose 5 % (D5W) 250 mL IVPB  30 mmol Intravenous PRN Axel Haynes, NP        sodium phosphate 39.99 mmol in dextrose 5 % (D5W) 250 mL IVPB  39.99 mmol Intravenous PRN Axel Haynes NP           Review of Systems   Unable to perform ROS: Intubated     Objective:     Vital Signs (Most Recent):  Temp: 98.3 °F (36.8 °C) (02/05/24 1101)  Pulse: (!) 112 (02/05/24 1545)  Resp: (!) 22 (02/05/24 1545)  BP: (!) 148/65 (02/05/24 1500)  SpO2: 99 % (02/05/24 1545) Vital Signs (24h Range):  Temp:  [98.2 °F (36.8 °C)-98.4 °F (36.9 °C)] 98.3 °F (36.8 °C)  Pulse:  [] 112  Resp:  [14-27] 22  SpO2:  [98 %-100 %] 99 %  BP: (142-175)/(65-94) 148/65  Arterial Line BP: (138-183)/(62-83) 157/63     Weight: (!) 165.3 kg (364 lb 6.7 oz)  Body mass index is 58.85 kg/m².     Physical Exam  Constitutional:       Appearance: She is ill-appearing.      Interventions: She is  intubated.   Pulmonary:      Effort: She is intubated.   Neurological:      Mental Status: She is lethargic.      GCS: GCS eye subscore is 2. GCS verbal subscore is 1. GCS motor subscore is 1.          NEUROLOGICAL EXAMINATION:     MENTAL STATUS   Level of consciousness: responsive to painful stimuli    CRANIAL NERVES     CN III, IV, VI   Right pupil: Size: 3 mm. Shape: regular. Reactivity: brisk.   Left pupil: Size: 3 mm. Shape: regular. Reactivity: brisk.       Significant Labs: All pertinent lab results from the past 24 hours have been reviewed.    Significant Imaging: I have reviewed and interpreted all pertinent imaging results/findings within the past 24 hours.  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 Also found to be in DKA and ARF. S/p debridement on 1/29 and 1/31. When taken to OR patient returned intubated and has not been able to be extubated since. [place] [facility] [place]. Her CTH on 2/3 showed hypoattenuation along left anterior temporal lobe, bilateral centrum semiovale, left anterior corpus callosum, right caudate, subinsular region which may indicate prior infarcts. Patient has been off all sedation and remains minimally responsive. Neurology is consulted for encephalopathy.    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 EEG w/ diffuse slowing w/o concern for seizure. MRI Brain is pending to further characterize findings from CTH.    Recommendations:  -MRI brain pending now that EEG is completed  -would avoid neurotoxic antibiotics (carbapenems, cefepime) if possible  -neurology will continue to follow, please contact us with questions/concerns        VTE Risk Mitigation  (From admission, onward)           Ordered     heparin (porcine) injection 7,500 Units  Every 8 hours         02/02/24 0825     Reason for No Pharmacological VTE Prophylaxis  Once        Question:  Reasons:  Answer:  Risk of Bleeding    01/31/24 1331     IP VTE HIGH RISK PATIENT  Once         01/31/24 1331     Place sequential compression device  Until discontinued         01/30/24 0016     Place NATE hose  Until discontinued         01/30/24 0016                    Kristopher Espinoza MD  Neurology  [facility] - Surgical Intensive Care

## 2024-02-05 NOTE — NURSING
SICU PLAN OF CARE NOTE    Dx: Ayaz's gangrene    Goals of Care:   Sys <180  Map >65    Vital Signs (last 12 hours):   Temp:  [98.2 °F (36.8 °C)-98.3 °F (36.8 °C)]   Pulse:  []   Resp:  [17-29]   BP: (144-175)/(65-88)   SpO2:  [97 %-100 %]   Arterial Line BP: (148-183)/(62-83)      Neuro: withdraws to pain, opens eyes to pain    Cardiac: NSR    CVP: 8-10    Respiratory: Ventilator    Gtts: Insulin    Urine Output: Urinary Catheter 65 cc/shift    Drains:   NG: residuals 10-25  WV: 50 cc  Rectal Tube: 350, flushed    Diet: Tube Feeds @ 30  ml/hr     Labs/Accuchecks: Q4 accuchecks. Daily labs    Skin:  All skin remains free from injury. Patient turned Q2, mattress inflated, and bed working correctly.    Shift Events:  Systolics goals maintained with prn hydralazine & pain controlled with dilaudid. Plans to complete EEG monitoring in AM then obtain MRI. Rectal tube flushed and having output. Electrolytes replaced as ordered. Amlodipine given early per Dr. Stokes. See flowsheet for further assessment/details. Family updated on current condition/plan of care, questions answered, and emotional support provided. MD updated on current condition, vitals, labs, and gtts.  No new orders received, will continue to monitor.

## 2024-02-05 NOTE — SUBJECTIVE & OBJECTIVE
Subjective:     Interval History: Neuro exam largely unchanged. To return to OR tomorrow.    Current Facility-Administered Medications   Medication Dose Route Frequency Provider Last Rate Last Admin    0.9%  NaCl infusion   Intravenous PRN Jailene Worthy MD        acetaminophen tablet 650 mg  650 mg Per NG tube Q6H Wilian Soria MD   650 mg at 02/05/24 0503    albuterol-ipratropium 2.5 mg-0.5 mg/3 mL nebulizer solution 3 mL  3 mL Nebulization Q4H PRN Hector Sam MD        [START ON 2/6/2024] amLODIPine tablet 10 mg  10 mg Per OG tube Daily David Stokes MD        calcium gluconate 3 g in dextrose 5 % (D5W) 100 mL infusion   Intravenous Continuous Axel Haynes NP        cefTRIAXone (ROCEPHIN) 2 g in dextrose 5 % in water (D5W) 100 mL IVPB (MB+)  2 g Intravenous Q24H Chirag Borges MD   Stopped at 02/05/24 1027    dextrose 10 % infusion   Intravenous Continuous PRN Zoie Muñiz NP        dextrose 10% bolus 125 mL 125 mL  12.5 g Intravenous PRN Asher Muñizlie ERROL, NP        dextrose 10% bolus 250 mL 250 mL  25 g Intravenous PRN Zoie Muñiz, NP        dextrose-sod citrate-citric ac 2.45-2.2 gram- 800 mg/100 mL Soln   Intra-Catheter Continuous Axel Haynes NP        famotidine tablet 20 mg  20 mg Per OG tube Daily David Stokes MD   20 mg at 02/05/24 0956    glucagon (human recombinant) injection 1 mg  1 mg Intramuscular PRN Asher Muñizlie ERROL, NP        glucose chewable tablet 16 g  16 g Oral PRN SanAsher leylie A., NP        glucose chewable tablet 24 g  24 g Oral PRN Zoie Muñiz, NP        heparin (porcine) injection 7,500 Units  7,500 Units Subcutaneous Q8H Chirag Borges MD   7,500 Units at 02/05/24 1338    hydrALAZINE injection 10 mg  10 mg Intravenous Q4H PRN Carlos Acuna MD   10 mg at 02/05/24 1120    HYDROmorphone injection 0.5 mg  0.5 mg Intravenous Q4H PRN Bree Lewis, POONAM        insulin aspart U-100 pen 0-10 Units  0-10 Units Subcutaneous Q4H PRN Zoie Muñiz, NP   4  Units at 02/05/24 1138    insulin aspart U-100 pen 14 Units  14 Units Subcutaneous Q4H Zoie Muñiz NP   14 Units at 02/05/24 1138    insulin regular in 0.9 % NaCl 100 unit/100 mL (1 unit/mL) infusion  2.2 Units/hr Intravenous Continuous Zoie Muñiz NP 1.8 mL/hr at 02/05/24 1101 1.8 Units/hr at 02/05/24 1101    labetalol 20 mg/4 mL (5 mg/mL) IV syring  10 mg Intravenous Q6H PRN Chirag Borges MD   10 mg at 02/02/24 1440    magnesium sulfate 2g in water 50mL IVPB (premix)  2 g Intravenous PRN Axel Haynes NP        melatonin tablet 6 mg  6 mg Oral Nightly PRN Hector Sam MD        metronidazole IVPB 500 mg  500 mg Intravenous Q8H Devika Falcon MD   Stopped at 02/05/24 1211    naloxone 0.4 mg/mL injection 0.02 mg  0.02 mg Intravenous PRN Suzanne Woo MD        ondansetron injection 4 mg  4 mg Intravenous Q6H PRN Hector Sam MD        oxyCODONE 5 mg/5 mL solution 10 mg  10 mg Per OG tube Q6H PRN Bree Lewis DNP   10 mg at 02/05/24 0954    oxyCODONE 5 mg/5 mL solution 5 mg  5 mg Per OG tube Q6H PRN Bree eLwis DNP        prochlorperazine injection Soln 5 mg  5 mg Intravenous Q6H PRN Hector Sam MD        psyllium husk (aspartame) 3.4 gram packet 1 packet  1 packet Per OG tube BID David Stokes MD   1 packet at 02/05/24 0955    sodium chloride 0.9% bolus 250 mL 250 mL  250 mL Intravenous PRN Jailene Worthy MD        sodium chloride 0.9% bolus 250 mL 250 mL  250 mL Intravenous PRN Jailene Worthy MD        sodium chloride 0.9% flush 10 mL  10 mL Intravenous PRN Hector Sam MD        sodium phosphate 20.01 mmol in dextrose 5 % (D5W) 250 mL IVPB  20.01 mmol Intravenous PRN Axel Haynes, NP        sodium phosphate 30 mmol in dextrose 5 % (D5W) 250 mL IVPB  30 mmol Intravenous PRN Axel Haynes, NP        sodium phosphate 39.99 mmol in dextrose 5 % (D5W) 250 mL IVPB  39.99 mmol Intravenous PRN Axel Haynes, NP           Review of Systems   Unable to perform ROS: Intubated      Objective:     Vital Signs (Most Recent):  Temp: 98.3 °F (36.8 °C) (02/05/24 1101)  Pulse: (!) 112 (02/05/24 1545)  Resp: (!) 22 (02/05/24 1545)  BP: (!) 148/65 (02/05/24 1500)  SpO2: 99 % (02/05/24 1545) Vital Signs (24h Range):  Temp:  [98.2 °F (36.8 °C)-98.4 °F (36.9 °C)] 98.3 °F (36.8 °C)  Pulse:  [] 112  Resp:  [14-27] 22  SpO2:  [98 %-100 %] 99 %  BP: (142-175)/(65-94) 148/65  Arterial Line BP: (138-183)/(62-83) 157/63     Weight: (!) 165.3 kg (364 lb 6.7 oz)  Body mass index is 58.85 kg/m².     Physical Exam  Constitutional:       Appearance: She is ill-appearing.      Interventions: She is intubated.   Pulmonary:      Effort: She is intubated.   Neurological:      Mental Status: She is lethargic.      GCS: GCS eye subscore is 2. GCS verbal subscore is 1. GCS motor subscore is 1.          NEUROLOGICAL EXAMINATION:     MENTAL STATUS   Level of consciousness: responsive to painful stimuli    CRANIAL NERVES     CN III, IV, VI   Right pupil: Size: 3 mm. Shape: regular. Reactivity: brisk.   Left pupil: Size: 3 mm. Shape: regular. Reactivity: brisk.       Significant Labs: All pertinent lab results from the past 24 hours have been reviewed.    Significant Imaging: I have reviewed and interpreted all pertinent imaging results/findings within the past 24 hours.

## 2024-02-05 NOTE — TRANSFER OF CARE
"Anesthesia Transfer of Care Note    Patient: Sarah Saravia    Procedure(s) Performed: Procedure(s) (LRB):  DEBRIDEMENT, WOUND (Bilateral)    Patient location: ICU    Anesthesia Type: MAC and general    Transport from OR: Transported from OR on 6-10 L/min O2 by face mask with adequate spontaneous ventilation    Post pain: adequate analgesia    Post assessment: no apparent anesthetic complications    Post vital signs: stable    Level of consciousness: awake and alert    Nausea/Vomiting: no nausea/vomiting    Complications: none    Transfer of care protocol was followed      Last vitals: Visit Vitals  BP (!) 156/69 (BP Location: Left forearm, Patient Position: Lying)   Pulse 99   Temp 36.9 °C (98.4 °F) (Oral)   Resp 16   Ht 5' 5.98" (1.676 m)   Wt (!) 165.3 kg (364 lb 6.7 oz)   LMP 01/01/2024 (Approximate)   SpO2 100%   BMI 58.85 kg/m²     "

## 2024-02-05 NOTE — PROGRESS NOTES
[facility]  Critical Care - Surgery  Progress Note    Patient Name: Sarah Saravia  MRN: [id number]  Admission Date: 1/29/2024  Hospital Length of Stay: 7 days  Code Status: Full Code  Attending Provider: Steve Cole MD  [facility]   Principal Problem: Ayaz's gangrene    Subjective:   Interval History/Significant Events: Wound vacuum became dirty so was taken out and rectal tube placed. Currently on Psupport 10/5.     Follow-up For: Procedure(s) (LRB):  DEBRIDEMENT, WOUND (Bilateral)    Post-Operative Day: 3 Days Post-Op    Objective:     Vital Signs (Most Recent):  Temp: 98.4 °F (36.9 °C) (02/05/24 0300)  Pulse: 99 (02/05/24 0615)  Resp: 16 (02/05/24 0615)  BP: (!) 156/69 (02/05/24 0600)  SpO2: 100 % (02/05/24 0615) Vital Signs (24h Range):  Temp:  [98.2 °F (36.8 °C)-98.4 °F (36.9 °C)] 98.4 °F (36.9 °C)  Pulse:  [] 99  Resp:  [10-29] 16  SpO2:  [97 %-100 %] 100 %  BP: (137-175)/(65-88) 156/69  Arterial Line BP: (138-183)/(60-83) 152/68     Weight: (!) 165.3 kg (364 lb 6.7 oz)  Body mass index is 58.85 kg/m².      Intake/Output Summary (Last 24 hours) at 2/5/2024 0731  Last data filed at 2/5/2024 0600  Gross per 24 hour   Intake 1265.6 ml   Output 848 ml   Net 417.6 ml          Physical Exam  Vitals reviewed.   Constitutional:       Appearance: She is obese.   HENT:      Head: Normocephalic.      Right Ear: Tympanic membrane normal.      Nose: Nose normal.      Mouth/Throat:      Mouth: Mucous membranes are moist.   Eyes:      Pupils: Brisk Corneal and Pupillary Reflexes   Cardiovascular:      Rate and Rhythm: Normal rate and regular rhythm.   Pulmonary:      Effort: Pulmonary effort is normal.      Breath sounds: Normal breath sounds.      Intubated  Abdominal:      General: Abdomen is flat.      Palpations: Abdomen is soft.   Genitourinary:     General: Normal vulva.   Musculoskeletal:         General: Normal range of motion.    Skin:     General: Skin is warm.      Capillary Refill: Capillary refill takes less than 2 seconds.   Neurological:      Mental Status: She is alert.      Comments: On the vent; withdraws to pain but eyes not opening to pain  Psychiatric:         Mood and Affect: Mood normal.            Vents:  Vent Mode: Spont (02/05/24 0350)  Set Rate: 18 BPM (02/04/24 1151)  Vt Set: 420 mL (02/04/24 1151)  Pressure Support: 10 cmH20 (02/05/24 0350)  PEEP/CPAP: 5 cmH20 (02/05/24 0350)  Oxygen Concentration (%): 40 (02/05/24 0615)  Peak Airway Pressure: 16 cmH20 (02/05/24 0350)  Plateau Pressure: 15 cmH20 (02/05/24 0350)  Total Ve: 9.54 L/m (02/05/24 0350)  Negative Inspiratory Force (cm H2O): 0 (02/05/24 0350)  F/VT Ratio<105 (RSBI): (!) 34.66 (02/05/24 0350)    Lines/Drains/Airways       Central Venous Catheter Line  Duration             Trialysis (Dialysis) Catheter 01/31/24 1051 right internal jugular 4 days              Drain  Duration                  Urethral Catheter 01/29/24 2315 6 days         NG/OG Tube 02/01/24 2250 18 Fr. Center mouth 3 days         Rectal Tube 02/04/24 1545 <1 day              Airway  Duration                  Airway - Non-Surgical 01/31/24 1020 4 days              Arterial Line  Duration             Arterial Line 01/31/24 1025 Right Radial 4 days              Peripheral Intravenous Line  Duration                  Peripheral IV - Single Lumen 02/04/24 2241 20 G Right Antecubital <1 day                    Significant Labs:    CBC/Anemia Profile:  Recent Labs   Lab 02/04/24  0317 02/05/24  0321   WBC 25.10* 29.32*   HGB 7.9* 8.3*   HCT 22.7* 24.6*    227   MCV 76* 77*   RDW 14.6* 15.1*        Chemistries:  Recent Labs   Lab 02/04/24  0317 02/04/24  1510 02/05/24  0321    136 136  135*   K 3.6 3.7 3.9  3.8    105 106  105   CO2 20* 19* 19*  20*   BUN 54* 42* 52*  52*   CREATININE 5.2* 4.3* 5.2*  5.2*   CALCIUM 7.7* 8.0* 8.2*  8.1*   ALBUMIN 1.5* 1.7* 1.6*  1.6*   PROT  --    --  6.8   BILITOT  --   --  0.3   ALKPHOS  --   --  150*   ALT  --   --  15   AST  --   --  30   MG 2.2 2.0 2.1   PHOS 3.9  3.9 3.5 4.3  4.3           Significant Imaging:  N/A  Assessment/Plan:     Renal/  * Ayaz's gangrene    Neuro/Psych:   #Acute Encephalopathy  CTH 2/3 with scattered hypoattenuation likely representing age indeterminate infarcts. EEG findings consistent with moderate-severe encephalopathy.  -- Sedation: None  -- Pain: kermit tylenol, dialudid and oxy prn  -- GCS 7T: E2, V1T, M4; exam stable  -- Neurology following; appreciate recs  -- Pending MRI             Cards:   -- HDS  -- goal SBP < 180, MAP >65  -- hypertensive, hydralazine and labetalol prns  -- Continue amlodipine 5mg, will increase to 10mg tomorrow if still requiring multiple prns      Pulm:   #Acute Respiratory Failure  -- Intubated  -- Goal O2 sat > 90%  -- Daily SBT      Renal:  #ALVARADO on CKD  Received HD 2/1. ATN.   -- Nephrology following; appreciate recs  -- RRT as needed per nephrology  -- Keep meza for strict I/O  -- Stool output 250  -- Urine output: <50cc  Recent Labs   Lab 02/04/24 0317 02/04/24  1510 02/05/24  0321   BUN 54* 42* 52*  52*   CREATININE 5.2* 4.3* 5.2*  5.2*        FEN / GI:   -- Daily CMPs  -- NGT in place   -- Replace lytes as needed  -- Nutrition: NPO, TF (Novasource Renal) at goal 30 ml/hr  -- GI PPX   -- Nutrition following; appreciate recs  -- Dc miralax; started metamucil       ID:    #Ayaz's gangrene  s/p OR debridement for nec fascitis. R groin tissue with proteus, sensitive to ceftriaxone  -- Abx: ceftriaxone and flagyl  -- ID following ; appreciate recs  -- Debridement and wound vac change planned for 2/6  Recent Labs   Lab 02/03/24  0327 02/04/24  0317 02/05/24  0321   WBC 27.03* 25.10* 29.32*        Heme/Onc:  -- Daily CBC  -- Heparin DVT ppx  Recent Labs   Lab 02/01/24  2246 02/02/24  0335 02/03/24  0327 02/04/24  0317 02/05/24  0321   HGB  --    < > 8.5* 7.9* 8.3*   PLT  --    < > 241  205 227   APTT 27.8  --   --   --   --    INR 1.0  --   --   --   --     < > = values in this interval not displayed.        Endo:   #IDDM  Initially presented to OSH with DKA with latest A1C 10.4 AG Gap resolved  - Increasing insulin prn requirements since starting tube feeds  - Placed on insulin gtt 2/3: Transition IV insulin infusion at 1.8 u/hr with stepdown parameters (0.5 u/kg dosing)   - Increased Novolog to 10 units units q 4 hrs while on Tube feeds (HOLD if tube feeds are stopped or BG < 100)  - Moderate dose SSI  - Endocrine following, appreciate recs        PPx:   Feeding: NPO, TF @ 30  Analgesia/Sedation: See above  Thromboembolic prevention: SQH   HOB >30: Y  Stress Ulcer ppx: Famotidine  Glucose control: Critical care goal 140-180 g/dl, Insulin gtt, kermit novolog, SSI, Endo following  Bowel reg: N/A  Invasive Lines/Drains/Airway: Glynn, ETT, Art line, Trialysis, PIV x2, Rectal tube      Dispo/Code Status/Palliative:   -- SICU / Full Code          David Stokes MD  Critical Care - Surgery  [facility]

## 2024-02-05 NOTE — SUBJECTIVE & OBJECTIVE
Interval History: ".  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 Taken to OR on 2/2 for debridement of the right buttocks and groin (#3). Evaluated by Neurology on 2/3 for encephalopathy. CT head with remote infarcts. Not withdrawing to pain on the morning of 2/5.    Review of Systems   Unable to perform ROS: Intubated     Objective:     Vital Signs (Most Recent):  Temp: 98.4 °F (36.9 °C) (02/05/24 0300)  Pulse: 99 (02/05/24 1015)  Resp: (!) 21 (02/05/24 1015)  BP: (!) 169/73 (02/05/24 1000)  SpO2: 100 % (02/05/24 1015) Vital Signs (24h Range):  Temp:  [98.2 °F (36.8 °C)-98.4 °F (36.9 °C)] 98.4 °F (36.9 °C)  Pulse:  [] 99  Resp:  [11-29] 21  SpO2:  [97 %-100 %] 100 %  BP: (137-175)/(65-94) 169/73  Arterial Line BP: (138-183)/(60-83) 179/73     Weight: (!) 165.3 kg (364 lb 6.7 oz)  Body mass index is 58.85 kg/m².    Estimated Creatinine Clearance: 20.1 mL/min (A) (based on SCr of 5.2 mg/dL (H)).     Physical Exam  Vitals and nursing note reviewed.   Constitutional:       Appearance: She is well-developed. She is ill-appearing.      Interventions: She is intubated.   HENT:      Head: Normocephalic and atraumatic.      Right Ear: External ear normal.      Left Ear: External ear normal.   Eyes:      General: No scleral icterus.        Right eye: No discharge.         Left eye: No discharge.      Conjunctiva/sclera: Conjunctivae normal.   Cardiovascular:      Rate and Rhythm: Normal rate and regular rhythm.      Heart sounds: Normal heart sounds. No murmur heard.     No friction rub. No gallop.   Pulmonary:      Effort: Pulmonary effort is normal. No respiratory distress. She is intubated.      Breath sounds: No stridor. Rhonchi present. No wheezing or rales.   Abdominal:      General: Bowel sounds are normal.   Musculoskeletal:         General: No tenderness.   Skin:     General: Skin is warm and dry.      Comments: Wound VAC on right groin area    Neurological:      Mental Status: She is lethargic.      Comments: Does not respond to verbal, tactile or noxious stimuli. Does not withdraw to painful stimuli.          Significant Labs: BMP:   Recent Labs   Lab 02/05/24  0321   *  227*     135*   K 3.9  3.8     105   CO2 19*  20*   BUN 52*  52*   CREATININE 5.2*  5.2*   CALCIUM 8.2*  8.1*   MG 2.1       CBC:   Recent Labs   Lab 02/04/24  0317 02/05/24  0321   WBC 25.10* 29.32*   HGB 7.9* 8.3*   HCT 22.7* 24.6*    227       Microbiology Results (last 7 days)       Procedure Component Value Units Date/Time    Culture, Anaerobe [[id number]] Collected: 01/30/24 0228    Order Status: Completed Specimen: Wound from Groin Updated: 02/04/24 0840     Anaerobic Culture Culture in progress    Narrative:      Right groin tissue    Culture, Anaerobe [[id number]] Collected: 01/30/24 0219    Order Status: Completed Specimen: Abscess from Groin Updated: 02/04/24 0840     Anaerobic Culture Culture in progress    Narrative:      Right groin abcess    Blood culture x two cultures. Draw prior to antibiotics. [[id number]] Collected: 01/29/24 2118    Order Status: Completed Specimen: Blood from Peripheral, Antecubital, Left Updated: 02/02/24 2303     Blood Culture, Routine No Growth after 4 days.    Narrative:      Aerobic and anaerobic    Blood culture x two cultures. Draw prior to antibiotics. [[id number]] Collected: 01/29/24 1929    Order Status: Completed Specimen: Blood from Peripheral, Antecubital, Right Updated: 02/02/24 2103     Blood Culture, Routine No Growth after 4 days.    Narrative:      Aerobic and anaerobic    Aerobic culture [[id number]]  (Abnormal)  (Susceptibility) Collected: 01/30/24 0228    Order Status: Completed Specimen: Wound from Groin Updated: 02/02/24 0746     Aerobic Bacterial Culture PROTEUS MIRABILIS  Moderate      Narrative:      Right groin tissue    Aerobic culture [[id number]]  (Abnormal)  (Susceptibility)  Collected: 01/30/24 0219    Order Status: Completed Specimen: Abscess from Groin Updated: 02/02/24 0746     Aerobic Bacterial Culture PROTEUS MIRABILIS  Moderate      Narrative:      Right groin abcess    AFB Culture & Smear [[id number]] Collected: 01/30/24 0219    Order Status: Completed Specimen: Abscess from Groin Updated: 01/31/24 2127     AFB Culture & Smear Culture in progress     AFB CULTURE STAIN No acid fast bacilli seen.    Narrative:      Right groin abcess    AFB Culture & Smear [[id number]] Collected: 01/30/24 0228    Order Status: Completed Specimen: Wound from Groin Updated: 01/31/24 2127     AFB Culture & Smear Culture in progress     AFB CULTURE STAIN No acid fast bacilli seen.    Narrative:      Right groin tissue    Gram stain [[id number]] Collected: 01/30/24 0228    Order Status: Completed Specimen: Wound from Groin Updated: 01/30/24 1035     Gram Stain Result Moderate Gram positive cocci in pairs and chains      No WBC's    Narrative:      Right groin tissue    Gram stain [[id number]] Collected: 01/30/24 0219    Order Status: Completed Specimen: Abscess from Groin Updated: 01/30/24 1035     Gram Stain Result Moderate Gram positive cocci in pairs and chains      No WBC's    Narrative:      Right groin abcess    Fungus culture [[id number]] Collected: 01/30/24 0219    Order Status: Sent Specimen: Abscess from Groin Updated: 01/30/24 0247    Fungus culture [[id number]] Collected: 01/30/24 0228    Order Status: Sent Specimen: Wound from Groin Updated: 01/30/24 0243    AFB Culture & Smear [[id number]] Collected: 01/30/24 0058    Order Status: Sent Specimen: Abscess from Groin Updated: 01/30/24 0058    AFB Culture & Smear [[id number]] Collected: 01/29/24 1115    Order Status: Sent Specimen: Abscess from Groin Updated: 01/30/24 0019    AFB Culture & Smear [[id number]] Collected: 01/29/24 1115    Order Status: Sent Specimen: Abscess from Groin Updated: 01/30/24 0019    Fungus culture [[id number]]  Collected: 01/29/24 1115    Order Status: Canceled Specimen: Abscess from Groin     Gram stain [[id number]] Collected: 01/29/24 1115    Order Status: Canceled Specimen: Abscess from Groin     Culture, Anaerobe [[id number]] Collected: 01/29/24 1115    Order Status: Canceled Specimen: Abscess from Groin     Aerobic culture [[id number]] Collected: 01/29/24 1115    Order Status: Canceled Specimen: Abscess from Groin     Culture, Anaerobe [[id number]] Collected: 01/29/24 1115    Order Status: Canceled Specimen: Abscess from Groin     Aerobic culture [[id number]] Collected: 01/29/24 1115    Order Status: Canceled Specimen: Abscess from Groin     Gram stain [[id number]] Collected: 01/29/24 1115    Order Status: Canceled Specimen: Abscess from Groin     Fungus culture [[id number]] Collected: 01/29/24 1115    Order Status: Canceled Specimen: Abscess from Groin             Significant Imaging: I have reviewed all pertinent imaging results/findings within the past 24 hours.

## 2024-02-05 NOTE — ASSESSMENT & PLAN NOTE
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 Also found to be in DKA and ARF. S/p debridement on 1/29 and 1/31. When taken to OR patient returned intubated and has not been able to be extubated since. Transferred to [facility] S[facility] on 2/1. Her CTH on 2/3 showed hypoattenuation along left anterior temporal lobe, bilateral centrum semiovale, left anterior corpus callosum, right caudate, subinsular region which may indicate prior infarcts. Patient has been off all sedation and remains minimally responsive. Neurology is consulted for encephalopathy.    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 EEG w/ diffuse slowing w/o concern for seizure. MRI Brain is pending to further characterize findings from CTH.    Recommendations:  -MRI brain pending now that EEG is completed  -would avoid neurotoxic antibiotics (carbapenems, cefepime) if possible  -neurology will continue to follow, please contact us with questions/concerns

## 2024-02-05 NOTE — ASSESSMENT & PLAN NOTE
Lab Results   Component Value Date    CREATININE 5.2 (H) 02/05/2024    CREATININE 5.2 (H) 02/05/2024     Avoid insulin stacking  Titrate insulin slowly

## 2024-02-05 NOTE — ASSESSMENT & PLAN NOTE
BG goal 140-180    No previous hx of T2DM per family at bedside. A1c of 10.4. DKA at OSH.     Plan:  -Increase Transition IV insulin infusion to 2.2 u/hr with stepdown parameters (20% dose increase)   -Increase Novolog to 14 units q 4 hrs while on tube feeding (HOLD if tube feeding is stopped or BG < 100) 20% dose increase; still requiring frequent prn SQ insulin correction   -Continue Moderate Dose Correction Scale  -BG monitoring q 4 hrs while NPO     ** Please call Endocrine for any BG related issues **      Discharge plans: TBD    Lab Results   Component Value Date    HGBA1C 10.4 (H) 01/30/2024

## 2024-02-05 NOTE — PROGRESS NOTES
EEG Disconnect  Small cut noticed at Fp1, Fp2 for unknown reason.  No skin breakdown seen     Skin Integrity: Normal     Carlos Davies   02/05/2024 10:30 AM

## 2024-02-05 NOTE — PLAN OF CARE
Covering CM received clinical update from team PA. Patient is not medically ready to discharge. OR planned Tues. 2/6 for wound vac change and wound reassessment. Plan: LTAC placement. Medicaid - [place], pending. CM/SW will continue to follow.      Dunia Burton RN  Weekend  - [facility]  Spectralink: [phone]

## 2024-02-05 NOTE — ASSESSMENT & PLAN NOTE
Not withdrawing to pain on 2/5.   Will follow up MRI ordered by primary.  Other management per primary.

## 2024-02-05 NOTE — SUBJECTIVE & OBJECTIVE
"Interval HPI:   Overnight events: Remains intubated in SICU. POD 3. BG remains above goal ranges on current IV/SQ insulin regimen. Plans for OR chris for wound vac exchange. Creatinine 5.2. Diet NPO    Eating:   NPO  Nausea: No  Hypoglycemia and intervention: No  Fever: No  TPN and/or TF: Yes  If yes, type of TF/TPN and rate: Tube feeds at 30 ml/hr     BP (!) 169/73   Pulse 98   Temp 98.4 °F (36.9 °C) (Oral)   Resp 20   Ht 5' 5.98" (1.676 m)   Wt (!) 165.3 kg (364 lb 6.7 oz)   LMP 01/01/2024 (Approximate) Comment: tubal ligation  SpO2 100%   BMI 58.85 kg/m²     Labs Reviewed and Include    Recent Labs   Lab 02/05/24  0321   *  227*   CALCIUM 8.2*  8.1*   ALBUMIN 1.6*  1.6*   PROT 6.8     135*   K 3.9  3.8   CO2 19*  20*     105   BUN 52*  52*   CREATININE 5.2*  5.2*   ALKPHOS 150*   ALT 15   AST 30   BILITOT 0.3     Lab Results   Component Value Date    WBC 29.32 (H) 02/05/2024    HGB 8.3 (L) 02/05/2024    HCT 24.6 (L) 02/05/2024    MCV 77 (L) 02/05/2024     02/05/2024     No results for input(s): "TSH", "FREET4" in the last 168 hours.  Lab Results   Component Value Date    HGBA1C 10.4 (H) 01/30/2024       Nutritional status:   Body mass index is 58.85 kg/m².  Lab Results   Component Value Date    ALBUMIN 1.6 (L) 02/05/2024    ALBUMIN 1.6 (L) 02/05/2024    ALBUMIN 1.7 (L) 02/04/2024     No results found for: "PREALBUMIN"    Estimated Creatinine Clearance: 20.1 mL/min (A) (based on SCr of 5.2 mg/dL (H)).    Accu-Checks  Recent Labs     02/03/24  2014 02/04/24  0025 02/04/24  0425 02/04/24  0756 02/04/24  1213 02/04/24  1514 02/04/24  2004 02/05/24  0014 02/05/24  0403 02/05/24  0759   POCTGLUCOSE 376* 318* 311* 228* 263* 206* 238* 229* 237* 251*       Current Medications and/or Treatments Impacting Glycemic Control  Immunotherapy:    Immunosuppressants       None          Steroids:   Hormones (From admission, onward)      Start     Stop Route Frequency Ordered    01/30/24 0114 "  melatonin tablet 6 mg         -- Oral Nightly PRN 01/30/24 0016          Pressors:    Autonomic Drugs (From admission, onward)      None          Hyperglycemia/Diabetes Medications:   Antihyperglycemics (From admission, onward)      Start     Stop Route Frequency Ordered    02/05/24 1200  insulin aspart U-100 pen 14 Units         -- SubQ Every 4 hours 02/05/24 0822    02/03/24 1915  insulin regular in 0.9 % NaCl 100 unit/100 mL (1 unit/mL) infusion        Question:  Enter initial dose (Units/hr):  Answer:  1.8    -- IV Continuous 02/03/24 1812    02/03/24 1010  insulin aspart U-100 pen 0-10 Units         -- SubQ Every 4 hours PRN 02/03/24 0911

## 2024-02-05 NOTE — PROGRESS NOTES
[facility]  General Surgery  Progress Note    Subjective:     History of Present Illness:  53 year old morbidly obese female who does not routinely go to the doctor presents to the ED with malaise, nausea, vomiting, and groin, buttock, perineal swelling and tenderness. She began feeling ill a few days ago.     On arrival to the ED she is tachycardic to 120, hypertensive without fever. Labs demonstrate leukocytosis of 21, , with lactic acidosis and associated ALVARADO with cr 3.8, hyperglycemia with blood glucose of 824 accompanied by severe electrolyte derangements. CT imaging obtained demonstrates diffuse subcutaneous air along buttocks, perineum, anterior vulva, as well as bilateral thighs.     No prior abdominal surgeries. She denies problems with her heart or lungs. Non smoker. Does not routinely take any medications.    Post-Op Info:  Procedure(s) (LRB):  DEBRIDEMENT, WOUND (Bilateral)   3 Days Post-Op     Interval History: Pt seen and examined at bedside.  On mechanical ventilation. Wound vac with 100 cc out, 50 cc overnight. On insulin gtt 1.8 units/hr. Wbc 29 this morning from 25.    Medications:  Continuous Infusions:   dextrose 10 % in water (D10W)      insulin regular 1 units/mL infusion orderable (TRANSFER) 1.8 Units/hr (02/05/24 0600)     Scheduled Meds:   sodium chloride 0.9%   Intravenous Once    sodium chloride 0.9%   Intravenous Once    acetaminophen  650 mg Per NG tube Q6H    amLODIPine  5 mg Oral Daily    cefTRIAXone (Rocephin) IV (PEDS and ADULTS)  2 g Intravenous Q24H    chlorhexidine  15 mL Mouth/Throat BID    famotidine (PF)  20 mg Intravenous Daily    heparin (porcine)  7,500 Units Subcutaneous Q8H    insulin aspart U-100  12 Units Subcutaneous Q4H    metronidazole  500 mg Intravenous Q8H    psyllium husk (aspartame)  1 packet Oral BID     PRN Meds:sodium chloride 0.9%, sodium chloride 0.9%, albuterol-ipratropium, dextrose 10 % in water (D10W), dextrose 10%, dextrose  10%, dextrose 10%, dextrose 10%, glucagon (human recombinant), glucagon (human recombinant), glucose, glucose, hydrALAZINE, HYDROmorphone, insulin aspart U-100, labetalol, melatonin, naloxone, ondansetron, oxyCODONE, potassium chloride **AND** potassium chloride **AND** potassium chloride, prochlorperazine, sodium chloride 0.9%, sodium chloride 0.9%, sodium chloride 0.9%     Review of patient's allergies indicates:  No Known Allergies  Objective:     Vital Signs (Most Recent):  Temp: 98.4 °F (36.9 °C) (02/05/24 0300)  Pulse: 99 (02/05/24 0615)  Resp: 16 (02/05/24 0615)  BP: (!) 156/69 (02/05/24 0600)  SpO2: 100 % (02/05/24 0615) Vital Signs (24h Range):  Temp:  [98.2 °F (36.8 °C)-98.4 °F (36.9 °C)] 98.4 °F (36.9 °C)  Pulse:  [] 99  Resp:  [10-29] 16  SpO2:  [97 %-100 %] 100 %  BP: (137-175)/(65-88) 156/69  Arterial Line BP: (138-183)/(60-83) 152/68     Weight: (!) 165.3 kg (364 lb 6.7 oz)  Body mass index is 58.85 kg/m².    Intake/Output - Last 3 Shifts         02/03 0700  02/04 0659 02/04 0700 02/05 0659 02/05 0700 02/06 0659    I.V. (mL/kg) 1202.9 (7.3) 77.4 (0.5)     NG/ 450     IV Piggyback 503.1 959.8     Total Intake(mL/kg) 2306 (14) 1487.3 (9)     Urine (mL/kg/hr) 50 (0) 160 (0)     Drains 55 15     Other 1566 804     Stool  350     Total Output 1671 1329     Net +635 +158.3            Stool Occurrence  2 x             Physical Exam  Vitals reviewed.   Constitutional:       General: She is not in acute distress.     Appearance: She is obese. She is ill-appearing.   HENT:      Head: Normocephalic.   Neck:      Comments: Right IJ trialysis   Cardiovascular:      Rate and Rhythm: Regular rhythm.      Pulses: Normal pulses.   Pulmonary:      Effort: Pulmonary effort is normal.      Comments: Mechanically ventilated   Abdominal:      Palpations: Abdomen is soft.      Tenderness: There is no abdominal tenderness.   Genitourinary:     Comments: Glynn in place with clear yellow urine   Musculoskeletal:       Comments: Wound vac in place with adequate seal. Surrounding skin soft edematous, no crepitus    Skin:     General: Skin is warm and dry.   Neurological:      General: No focal deficit present.          Significant Labs:  I have reviewed all pertinent lab results within the past 24 hours.  CBC:   Recent Labs   Lab 02/05/24  0321   WBC 29.32*   RBC 3.19*   HGB 8.3*   HCT 24.6*      MCV 77*   MCH 26.0*   MCHC 33.7       CMP:   Recent Labs   Lab 02/05/24 0321   *  227*   CALCIUM 8.2*  8.1*   ALBUMIN 1.6*  1.6*   PROT 6.8     135*   K 3.9  3.8   CO2 19*  20*     105   BUN 52*  52*   CREATININE 5.2*  5.2*   ALKPHOS 150*   ALT 15   AST 30   BILITOT 0.3         Significant Diagnostics:  I have reviewed all pertinent imaging results/findings within the past 24 hours.  Assessment/Plan:     * Ayaz's gangrene  53 y.o. 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       - To OR tomorrow for wound vac change  - Daily SBTs  - Okay for DVT ppx   - Remainder of care per SICU        Jean Claude Lara MD  General Surgery  [facility]

## 2024-02-05 NOTE — ASSESSMENT & PLAN NOTE
53 y.o. 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       - To OR tomorrow for wound vac change  - Daily SBTs  - Okay for DVT ppx   - Remainder of care per SICU

## 2024-02-05 NOTE — PROCEDURES
DATE: 2/4/24     EEG NUMBER: FH -1     REFERRING PHYSICIAN:  Dr. Cole      This EEG was performed to assess for subclinical seizures      ELECTROENCEPHALOGRAM REPORT     METHODOLOGY:  Electroencephalographic (EEG) recording is with electrodes placed according to the International 10-20 placement system.  Thirty two (32) channels of digital signal are simultaneously recorded from the scalp and may include EKG, EMG, and/or eye monitors.   Recording band pass was 0.1 to 512 hz.  Digital video recording of the patient is simultaneously recorded with the EEG.  The nursing staff report clinical symptoms and may press an event button when the patient has symptoms of clinical interest to the treating physicians.  EEG and video recording is stored and archived in digital format.  The entire recording is visually reviewed, and the times identified by computer analysis as being spikes or seizures are reviewed again.  Activation procedures which include photic stimulation, hyperventilation and instructing patients to perform simple task are done in selected patients.   Compresses spectral analysis (CSA) is also performed on the activity recorded from each individual channel.  This is displayed as a power display of frequencies from 0 to 30 Hz over time.   The CSA analysis is done and displayed continuously.  This is reviewed for asymmetries in power between homologous areas of the scalp and for presence of changes in power which can be seen when seizures occur.  Sections of suspected abnormalities on the CSA is then compared with the original EEG recording.                [employer] software was also utilized in the review of this study.  This software suite analyzes the EEG recording in multiple domains.  Coherence and rhythmicity is computed to identify EEG sections which may contain organized seizures.  Each channel undergoes analysis to detect presence of spike and sharp waves which have special and morphological  characteristic of epileptic activity.  The routine EEG recording is converted from spacial into frequency domain.  This is then displayed comparing homologous areas to identify areas of significant asymmetry.  Algorithm to identify non-cortically generated artifact is used to separate eye movement, EMG and other artifact from the EEG.      Recording times   Start on February 4, 2024 at hour 14 minute 0 seconds 54   End on February 5, 2024 at hours 7 minute 0 seconds 14   The total time of EEG recording for the study was 16 hours and 50 minutes     EEG FINDINGS:  The recording was obtained with a number of standard bipolar and referential montages during stupor state.  In this state, the posterior background rhythm was a symmetric, well-modulated 6-7 Hz activity, which reacted symmetrically to eye opening.  Activation procedures were not performed.  Mixed generalized synchronous rhythmical delta activity was  noted.  During deeper stages of clinical sleep symmetric spindles were noted. There were no interictal epileptiform abnormalities and no clinical or electrographic seizures were recorded.     The EKG channel revealed sinus rhythm     IMPRESSION:  This is an abnormal EEG during stupor state. Diffuse slowing was noted. Mixed generalized synchronous high-amplitude rhythmical delta activity was  noted.       CLINICAL CORRELATION:  The patient is a 53-year-old female with renal insufficiency who is being evaluated for altered sensorium. She is currently not maintained on any antiseizure medications. This is an abnormal EEG during stupor state.  The overall degree of slowing and disorganization is suggestive of a moderate to severe degree of encephalopathy nonspecific to the cause.  There is no evidence of an epileptic process on this recording.  No seizures were recorded during this study.

## 2024-02-05 NOTE — SUBJECTIVE & OBJECTIVE
Interval History: Pt seen and examined at bedside.  On mechanical ventilation. Wound vac with 100 cc out, 50 cc overnight. On insulin gtt 1.8 units/hr. Wbc 29 this morning from 25.    Medications:  Continuous Infusions:   dextrose 10 % in water (D10W)      insulin regular 1 units/mL infusion orderable (TRANSFER) 1.8 Units/hr (02/05/24 0600)     Scheduled Meds:   sodium chloride 0.9%   Intravenous Once    sodium chloride 0.9%   Intravenous Once    acetaminophen  650 mg Per NG tube Q6H    amLODIPine  5 mg Oral Daily    cefTRIAXone (Rocephin) IV (PEDS and ADULTS)  2 g Intravenous Q24H    chlorhexidine  15 mL Mouth/Throat BID    famotidine (PF)  20 mg Intravenous Daily    heparin (porcine)  7,500 Units Subcutaneous Q8H    insulin aspart U-100  12 Units Subcutaneous Q4H    metronidazole  500 mg Intravenous Q8H    psyllium husk (aspartame)  1 packet Oral BID     PRN Meds:sodium chloride 0.9%, sodium chloride 0.9%, albuterol-ipratropium, dextrose 10 % in water (D10W), dextrose 10%, dextrose 10%, dextrose 10%, dextrose 10%, glucagon (human recombinant), glucagon (human recombinant), glucose, glucose, hydrALAZINE, HYDROmorphone, insulin aspart U-100, labetalol, melatonin, naloxone, ondansetron, oxyCODONE, potassium chloride **AND** potassium chloride **AND** potassium chloride, prochlorperazine, sodium chloride 0.9%, sodium chloride 0.9%, sodium chloride 0.9%     Review of patient's allergies indicates:  No Known Allergies  Objective:     Vital Signs (Most Recent):  Temp: 98.4 °F (36.9 °C) (02/05/24 0300)  Pulse: 99 (02/05/24 0615)  Resp: 16 (02/05/24 0615)  BP: (!) 156/69 (02/05/24 0600)  SpO2: 100 % (02/05/24 0615) Vital Signs (24h Range):  Temp:  [98.2 °F (36.8 °C)-98.4 °F (36.9 °C)] 98.4 °F (36.9 °C)  Pulse:  [] 99  Resp:  [10-29] 16  SpO2:  [97 %-100 %] 100 %  BP: (137-175)/(65-88) 156/69  Arterial Line BP: (138-183)/(60-83) 152/68     Weight: (!) 165.3 kg (364 lb 6.7 oz)  Body mass index is 58.85  kg/m².    Intake/Output - Last 3 Shifts         02/03 0700  02/04 0659 02/04 0700  02/05 0659 02/05 0700  02/06 0659    I.V. (mL/kg) 1202.9 (7.3) 77.4 (0.5)     NG/ 450     IV Piggyback 503.1 959.8     Total Intake(mL/kg) 2306 (14) 1487.3 (9)     Urine (mL/kg/hr) 50 (0) 160 (0)     Drains 55 15     Other 1566 804     Stool  350     Total Output 1671 1329     Net +635 +158.3            Stool Occurrence  2 x              Physical Exam  Vitals reviewed.   Constitutional:       General: She is not in acute distress.     Appearance: She is obese. She is ill-appearing.   HENT:      Head: Normocephalic.   Neck:      Comments: Right IJ trialysis   Cardiovascular:      Rate and Rhythm: Regular rhythm.      Pulses: Normal pulses.   Pulmonary:      Effort: Pulmonary effort is normal.      Comments: Mechanically ventilated   Abdominal:      Palpations: Abdomen is soft.      Tenderness: There is no abdominal tenderness.   Genitourinary:     Comments: Glynn in place with clear yellow urine   Musculoskeletal:      Comments: Wound vac in place with adequate seal. Surrounding skin soft edematous, no crepitus    Skin:     General: Skin is warm and dry.   Neurological:      General: No focal deficit present.          Significant Labs:  I have reviewed all pertinent lab results within the past 24 hours.  CBC:   Recent Labs   Lab 02/05/24  0321   WBC 29.32*   RBC 3.19*   HGB 8.3*   HCT 24.6*      MCV 77*   MCH 26.0*   MCHC 33.7       CMP:   Recent Labs   Lab 02/05/24  0321   *  227*   CALCIUM 8.2*  8.1*   ALBUMIN 1.6*  1.6*   PROT 6.8     135*   K 3.9  3.8   CO2 19*  20*     105   BUN 52*  52*   CREATININE 5.2*  5.2*   ALKPHOS 150*   ALT 15   AST 30   BILITOT 0.3         Significant Diagnostics:  I have reviewed all pertinent imaging results/findings within the past 24 hours.

## 2024-02-05 NOTE — CARE UPDATE
I have reviewed the chart of Sarah Saravia and participated in the care of the patient who is hospitalized for the following:    Active Hospital Problems    Diagnosis    *Ayaz's gangrene    Leukocytosis    Encephalopathy    Encephalopathy, metabolic    Acute hypoxemic respiratory failure    Weaning from mechanically assisted ventilation initiated    Acute blood loss anemia    Acute renal failure with tubular necrosis    New onset type 2 diabetes mellitus    Metabolic acidosis    ALVARADO (acute kidney injury)    Hyponatremia    Diabetic acidosis without coma    Morbid (severe) obesity due to excess calories    Severe sepsis      I have reviewed Sarah Saravia with the multidisciplinary team during rounds.      Bree Lewis, POONAM  [facility]

## 2024-02-05 NOTE — SUBJECTIVE & OBJECTIVE
Interval History:   RRT held yesterday, net positive 500 ml/24h.  UOP of 160 ml.  Electrolytes non-emergent.  HDS off pressors, Minimal hourly intake.      Review of patient's allergies indicates:  No Known Allergies  Current Facility-Administered Medications   Medication Frequency    0.9%  NaCl infusion PRN    acetaminophen tablet 650 mg Q6H    albuterol-ipratropium 2.5 mg-0.5 mg/3 mL nebulizer solution 3 mL Q4H PRN    [START ON 2/6/2024] amLODIPine tablet 10 mg Daily    cefTRIAXone (ROCEPHIN) 2 g in dextrose 5 % in water (D5W) 100 mL IVPB (MB+) Q24H    dextrose 10 % infusion Continuous PRN    dextrose 10% bolus 125 mL 125 mL PRN    dextrose 10% bolus 250 mL 250 mL PRN    famotidine tablet 20 mg Daily    glucagon (human recombinant) injection 1 mg PRN    glucose chewable tablet 16 g PRN    glucose chewable tablet 24 g PRN    heparin (porcine) injection 7,500 Units Q8H    hydrALAZINE injection 10 mg Q4H PRN    HYDROmorphone injection 0.5 mg Q4H PRN    insulin aspart U-100 pen 0-10 Units Q4H PRN    insulin aspart U-100 pen 14 Units Q4H    insulin regular in 0.9 % NaCl 100 unit/100 mL (1 unit/mL) infusion Continuous    labetalol 20 mg/4 mL (5 mg/mL) IV syring Q6H PRN    melatonin tablet 6 mg Nightly PRN    metronidazole IVPB 500 mg Q8H    naloxone 0.4 mg/mL injection 0.02 mg PRN    ondansetron injection 4 mg Q6H PRN    oxyCODONE 5 mg/5 mL solution 10 mg Q6H PRN    oxyCODONE 5 mg/5 mL solution 5 mg Q6H PRN    prochlorperazine injection Soln 5 mg Q6H PRN    psyllium husk (aspartame) 3.4 gram packet 1 packet BID    sodium chloride 0.9% bolus 250 mL 250 mL PRN    sodium chloride 0.9% bolus 250 mL 250 mL PRN    sodium chloride 0.9% flush 10 mL PRN       Objective:     Vital Signs (Most Recent):  Temp: 98.3 °F (36.8 °C) (02/05/24 1101)  Pulse: 99 (02/05/24 1101)  Resp: 16 (02/05/24 1101)  BP: (!) 159/70 (02/05/24 1100)  SpO2: 100 % (02/05/24 1101) Vital Signs (24h Range):  Temp:  [98.2 °F (36.8 °C)-98.4 °F (36.9 °C)] 98.3  °F (36.8 °C)  Pulse:  [] 99  Resp:  [14-29] 16  SpO2:  [97 %-100 %] 100 %  BP: (142-175)/(65-94) 159/70  Arterial Line BP: (138-183)/(60-83) 167/72     Weight: (!) 165.3 kg (364 lb 6.7 oz) (02/05/24 0300)  Body mass index is 58.85 kg/m².  Body surface area is 2.77 meters squared.    I/O last 3 completed shifts:  In: 2969.4 [I.V.:1030.3; NG/GT:720; IV Piggyback:1219]  Out: 2920 [Urine:180; Drains:70; Other:2320; Stool:350]     Physical Exam  Constitutional:       General: She is not in acute distress.     Appearance: She is obese. She is not ill-appearing or toxic-appearing.      Interventions: She is sedated and intubated.   Eyes:      General: No scleral icterus.        Right eye: No discharge.         Left eye: No discharge.   Cardiovascular:      Rate and Rhythm: Normal rate.   Pulmonary:      Effort: She is intubated.      Comments: Vent Mode: A/C  Oxygen Concentration (%):  [40] 40  Resp Rate Total:  [19 br/min-32 br/min] 22 br/min  Vt Set:  [420 mL] 420 mL  PEEP/CPAP:  [5 cmH20] 5 cmH20  Mean Airway Pressure:  [6.5 weR18-82 cmH20] 11 cmH20      Abdominal:      General: There is distension.   Musculoskeletal:      Right lower leg: Edema present.      Left lower leg: Edema present.          Significant Labs:  CBC:   Recent Labs   Lab 02/05/24  0321   WBC 29.32*   RBC 3.19*   HGB 8.3*   HCT 24.6*      MCV 77*   MCH 26.0*   MCHC 33.7     CMP:   Recent Labs   Lab 02/05/24  0321   *  227*   CALCIUM 8.2*  8.1*   ALBUMIN 1.6*  1.6*   PROT 6.8     135*   K 3.9  3.8   CO2 19*  20*     105   BUN 52*  52*   CREATININE 5.2*  5.2*   ALKPHOS 150*   ALT 15   AST 30   BILITOT 0.3

## 2024-02-06 ENCOUNTER — ANESTHESIA EVENT (OUTPATIENT)
Dept: SURGERY | Facility: HOSPITAL | Age: 54
DRG: 004 | End: 2024-02-06
Payer: MEDICAID

## 2024-02-06 ENCOUNTER — ANESTHESIA (OUTPATIENT)
Dept: SURGERY | Facility: HOSPITAL | Age: 54
DRG: 004 | End: 2024-02-06
Payer: MEDICAID

## 2024-02-06 PROBLEM — I63.89 AC ISCH MULTI VASC TERRITORIES STROKE: Status: ACTIVE | Noted: 2024-02-06

## 2024-02-06 LAB
ABO + RH BLD: NORMAL
ALBUMIN SERPL BCP-MCNC: 1.6 G/DL (ref 3.5–5.2)
ALBUMIN SERPL BCP-MCNC: 1.7 G/DL (ref 3.5–5.2)
ALBUMIN SERPL BCP-MCNC: 1.8 G/DL (ref 3.5–5.2)
ALLENS TEST: ABNORMAL
AMMONIA PLAS-SCNC: 44 UMOL/L (ref 10–50)
ANION GAP SERPL CALC-SCNC: 10 MMOL/L (ref 8–16)
ANION GAP SERPL CALC-SCNC: 8 MMOL/L (ref 8–16)
ANION GAP SERPL CALC-SCNC: 8 MMOL/L (ref 8–16)
ANISOCYTOSIS BLD QL SMEAR: SLIGHT
ASCENDING AORTA: 2.57 CM
AV INDEX (PROSTH): 0.54
AV MEAN GRADIENT: 10 MMHG
AV PEAK GRADIENT: 16 MMHG
AV VALVE AREA BY VELOCITY RATIO: 2.06 CM²
AV VALVE AREA: 1.73 CM²
AV VELOCITY RATIO: 0.64
BASOPHILS NFR BLD: 0 % (ref 0–1.9)
BLD GP AB SCN CELLS X3 SERPL QL: NORMAL
BSA FOR ECHO PROCEDURE: 2.72 M2
BUN SERPL-MCNC: 30 MG/DL (ref 6–20)
BUN SERPL-MCNC: 30 MG/DL (ref 6–20)
BUN SERPL-MCNC: 39 MG/DL (ref 6–20)
CA-I BLDV-SCNC: 1.02 MMOL/L (ref 1.06–1.42)
CA-I BLDV-SCNC: 1.03 MMOL/L (ref 1.06–1.42)
CA-I BLDV-SCNC: 1.06 MMOL/L (ref 1.06–1.42)
CA-I BLDV-SCNC: 1.07 MMOL/L (ref 1.06–1.42)
CALCIUM SERPL-MCNC: 8.1 MG/DL (ref 8.7–10.5)
CALCIUM SERPL-MCNC: 8.2 MG/DL (ref 8.7–10.5)
CALCIUM SERPL-MCNC: 8.5 MG/DL (ref 8.7–10.5)
CHLORIDE SERPL-SCNC: 105 MMOL/L (ref 95–110)
CHLORIDE SERPL-SCNC: 106 MMOL/L (ref 95–110)
CHLORIDE SERPL-SCNC: 107 MMOL/L (ref 95–110)
CO2 SERPL-SCNC: 18 MMOL/L (ref 23–29)
CO2 SERPL-SCNC: 19 MMOL/L (ref 23–29)
CO2 SERPL-SCNC: 23 MMOL/L (ref 23–29)
CREAT SERPL-MCNC: 3.1 MG/DL (ref 0.5–1.4)
CREAT SERPL-MCNC: 3.4 MG/DL (ref 0.5–1.4)
CREAT SERPL-MCNC: 4.1 MG/DL (ref 0.5–1.4)
CV ECHO LV RWT: 0.44 CM
DELSYS: ABNORMAL
DIFFERENTIAL METHOD BLD: ABNORMAL
DOP CALC AO PEAK VEL: 2.01 M/S
DOP CALC AO VTI: 31.37 CM
DOP CALC LVOT AREA: 3.2 CM2
DOP CALC LVOT DIAMETER: 2.02 CM
DOP CALC LVOT PEAK VEL: 1.29 M/S
DOP CALC LVOT STROKE VOLUME: 54.2 CM3
DOP CALCLVOT PEAK VEL VTI: 16.92 CM
E/E' RATIO: 9.06 M/S
ECHO LV POSTERIOR WALL: 1.1 CM (ref 0.6–1.1)
EOSINOPHIL NFR BLD: 0 % (ref 0–8)
ERYTHROCYTE [DISTWIDTH] IN BLOOD BY AUTOMATED COUNT: 15.8 % (ref 11.5–14.5)
EST. GFR  (NO RACE VARIABLE): 12.4 ML/MIN/1.73 M^2
EST. GFR  (NO RACE VARIABLE): 15.5 ML/MIN/1.73 M^2
EST. GFR  (NO RACE VARIABLE): 17.3 ML/MIN/1.73 M^2
FIO2: 40
FRACTIONAL SHORTENING: 45 % (ref 28–44)
GLUCOSE SERPL-MCNC: 155 MG/DL (ref 70–110)
GLUCOSE SERPL-MCNC: 160 MG/DL (ref 70–110)
GLUCOSE SERPL-MCNC: 88 MG/DL (ref 70–110)
HCO3 UR-SCNC: 21.8 MMOL/L (ref 24–28)
HCT VFR BLD AUTO: 23.5 % (ref 37–48.5)
HGB BLD-MCNC: 7.9 G/DL (ref 12–16)
HYPOCHROMIA BLD QL SMEAR: ABNORMAL
IMM GRANULOCYTES # BLD AUTO: ABNORMAL K/UL (ref 0–0.04)
IMM GRANULOCYTES NFR BLD AUTO: ABNORMAL % (ref 0–0.5)
INTERVENTRICULAR SEPTUM: 1.3 CM (ref 0.6–1.1)
LA MAJOR: 5.14 CM
LA MINOR: 5.35 CM
LA WIDTH: 4.17 CM
LEFT ATRIUM SIZE: 3.99 CM
LEFT ATRIUM VOLUME INDEX: 29.3 ML/M2
LEFT ATRIUM VOLUME: 74.15 CM3
LEFT INTERNAL DIMENSION IN SYSTOLE: 2.73 CM (ref 2.1–4)
LEFT VENTRICLE DIASTOLIC VOLUME INDEX: 32.4 ML/M2
LEFT VENTRICLE DIASTOLIC VOLUME: 81.98 ML
LEFT VENTRICLE MASS INDEX: 92 G/M2
LEFT VENTRICLE SYSTOLIC VOLUME INDEX: 11 ML/M2
LEFT VENTRICLE SYSTOLIC VOLUME: 27.87 ML
LEFT VENTRICULAR INTERNAL DIMENSION IN DIASTOLE: 5 CM (ref 3.5–6)
LEFT VENTRICULAR MASS: 233.75 G
LV LATERAL E/E' RATIO: 7.7 M/S
LV SEPTAL E/E' RATIO: 11 M/S
LYMPHOCYTES NFR BLD: 24 % (ref 18–48)
MAGNESIUM SERPL-MCNC: 1.8 MG/DL (ref 1.6–2.6)
MAGNESIUM SERPL-MCNC: 2.2 MG/DL (ref 1.6–2.6)
MAGNESIUM SERPL-MCNC: 2.3 MG/DL (ref 1.6–2.6)
MCH RBC QN AUTO: 26.5 PG (ref 27–31)
MCHC RBC AUTO-ENTMCNC: 33.6 G/DL (ref 32–36)
MCV RBC AUTO: 79 FL (ref 82–98)
MIN VOL: 11.7
MODE: ABNORMAL
MONOCYTES NFR BLD: 3 % (ref 4–15)
MV PEAK E VEL: 0.77 M/S
MYELOCYTES NFR BLD MANUAL: 4 %
NEUTROPHILS NFR BLD: 62 % (ref 38–73)
NEUTS BAND NFR BLD MANUAL: 7 %
NRBC BLD-RTO: 0 /100 WBC
OVALOCYTES BLD QL SMEAR: ABNORMAL
PCO2 BLDA: 31.1 MMHG (ref 35–45)
PEEP: 5
PH SMN: 7.45 [PH] (ref 7.35–7.45)
PHOSPHATE SERPL-MCNC: 3.1 MG/DL (ref 2.7–4.5)
PHOSPHATE SERPL-MCNC: 3.1 MG/DL (ref 2.7–4.5)
PHOSPHATE SERPL-MCNC: 4.1 MG/DL (ref 2.7–4.5)
PHOSPHATE SERPL-MCNC: 5.5 MG/DL (ref 2.7–4.5)
PLATELET # BLD AUTO: 246 K/UL (ref 150–450)
PMV BLD AUTO: 11.2 FL (ref 9.2–12.9)
PO2 BLDA: 126 MMHG (ref 80–100)
POC BE: -2 MMOL/L
POC SATURATED O2: 99 % (ref 95–100)
POC TCO2: 23 MMOL/L (ref 23–27)
POCT GLUCOSE: 112 MG/DL (ref 70–110)
POCT GLUCOSE: 136 MG/DL (ref 70–110)
POCT GLUCOSE: 145 MG/DL (ref 70–110)
POCT GLUCOSE: 151 MG/DL (ref 70–110)
POCT GLUCOSE: 167 MG/DL (ref 70–110)
POCT GLUCOSE: 174 MG/DL (ref 70–110)
POCT GLUCOSE: 97 MG/DL (ref 70–110)
POIKILOCYTOSIS BLD QL SMEAR: SLIGHT
POLYCHROMASIA BLD QL SMEAR: ABNORMAL
POTASSIUM SERPL-SCNC: 3.9 MMOL/L (ref 3.5–5.1)
POTASSIUM SERPL-SCNC: 4.3 MMOL/L (ref 3.5–5.1)
POTASSIUM SERPL-SCNC: 4.4 MMOL/L (ref 3.5–5.1)
PS: 10
RA MAJOR: 4.87 CM
RA PRESSURE ESTIMATED: 3 MMHG
RA WIDTH: 3.19 CM
RBC # BLD AUTO: 2.98 M/UL (ref 4–5.4)
RIGHT VENTRICULAR END-DIASTOLIC DIMENSION: 3.16 CM
SAMPLE: ABNORMAL
SINUS: 2.79 CM
SITE: ABNORMAL
SODIUM SERPL-SCNC: 132 MMOL/L (ref 136–145)
SODIUM SERPL-SCNC: 136 MMOL/L (ref 136–145)
SODIUM SERPL-SCNC: 136 MMOL/L (ref 136–145)
SP02: 100
SPECIMEN OUTDATE: NORMAL
SPHEROCYTES BLD QL SMEAR: ABNORMAL
SPONT RATE: 20
STJ: 2.32 CM
TARGETS BLD QL SMEAR: ABNORMAL
TDI LATERAL: 0.1 M/S
TDI SEPTAL: 0.07 M/S
TDI: 0.09 M/S
TRICUSPID ANNULAR PLANE SYSTOLIC EXCURSION: 1.72 CM
VOL: 485
WBC # BLD AUTO: 32.17 K/UL (ref 3.9–12.7)
Z-SCORE OF LEFT VENTRICULAR DIMENSION IN END DIASTOLE: -11.05
Z-SCORE OF LEFT VENTRICULAR DIMENSION IN END SYSTOLE: -9.36

## 2024-02-06 PROCEDURE — 82803 BLOOD GASES ANY COMBINATION: CPT

## 2024-02-06 PROCEDURE — 63600175 PHARM REV CODE 636 W HCPCS: Performed by: NURSE ANESTHETIST, CERTIFIED REGISTERED

## 2024-02-06 PROCEDURE — 97606 NEG PRS WND THER DME>50 SQCM: CPT | Mod: ,,, | Performed by: STUDENT IN AN ORGANIZED HEALTH CARE EDUCATION/TRAINING PROGRAM

## 2024-02-06 PROCEDURE — 37000009 HC ANESTHESIA EA ADD 15 MINS: Performed by: STUDENT IN AN ORGANIZED HEALTH CARE EDUCATION/TRAINING PROGRAM

## 2024-02-06 PROCEDURE — 99232 SBSQ HOSP IP/OBS MODERATE 35: CPT | Mod: ,,, | Performed by: NURSE PRACTITIONER

## 2024-02-06 PROCEDURE — 25000242 PHARM REV CODE 250 ALT 637 W/ HCPCS

## 2024-02-06 PROCEDURE — 94003 VENT MGMT INPAT SUBQ DAY: CPT

## 2024-02-06 PROCEDURE — 27100171 HC OXYGEN HIGH FLOW UP TO 24 HOURS

## 2024-02-06 PROCEDURE — 25000003 PHARM REV CODE 250: Performed by: STUDENT IN AN ORGANIZED HEALTH CARE EDUCATION/TRAINING PROGRAM

## 2024-02-06 PROCEDURE — D9220A PRA ANESTHESIA: Mod: CRNA,,, | Performed by: NURSE ANESTHETIST, CERTIFIED REGISTERED

## 2024-02-06 PROCEDURE — 83735 ASSAY OF MAGNESIUM: CPT | Mod: 91 | Performed by: NURSE PRACTITIONER

## 2024-02-06 PROCEDURE — 86592 SYPHILIS TEST NON-TREP QUAL: CPT | Performed by: STUDENT IN AN ORGANIZED HEALTH CARE EDUCATION/TRAINING PROGRAM

## 2024-02-06 PROCEDURE — 11006 DBRDMT SKIN XTRNL GENT PER: CPT | Mod: ,,, | Performed by: STUDENT IN AN ORGANIZED HEALTH CARE EDUCATION/TRAINING PROGRAM

## 2024-02-06 PROCEDURE — 20000000 HC ICU ROOM

## 2024-02-06 PROCEDURE — 63600175 PHARM REV CODE 636 W HCPCS

## 2024-02-06 PROCEDURE — 99900026 HC AIRWAY MAINTENANCE (STAT)

## 2024-02-06 PROCEDURE — D9220A PRA ANESTHESIA: Mod: ANES,,, | Performed by: ANESTHESIOLOGY

## 2024-02-06 PROCEDURE — 27000923 HC TRIALYSIS CATHETER, ANY SIZE

## 2024-02-06 PROCEDURE — 25500020 PHARM REV CODE 255: Performed by: STUDENT IN AN ORGANIZED HEALTH CARE EDUCATION/TRAINING PROGRAM

## 2024-02-06 PROCEDURE — 25000003 PHARM REV CODE 250: Performed by: NURSE ANESTHETIST, CERTIFIED REGISTERED

## 2024-02-06 PROCEDURE — 99291 CRITICAL CARE FIRST HOUR: CPT | Mod: 24,,, | Performed by: STUDENT IN AN ORGANIZED HEALTH CARE EDUCATION/TRAINING PROGRAM

## 2024-02-06 PROCEDURE — 86901 BLOOD TYPING SEROLOGIC RH(D): CPT | Performed by: STUDENT IN AN ORGANIZED HEALTH CARE EDUCATION/TRAINING PROGRAM

## 2024-02-06 PROCEDURE — 63600175 PHARM REV CODE 636 W HCPCS: Performed by: NURSE PRACTITIONER

## 2024-02-06 PROCEDURE — 87529 HSV DNA AMP PROBE: CPT | Mod: 59 | Performed by: STUDENT IN AN ORGANIZED HEALTH CARE EDUCATION/TRAINING PROGRAM

## 2024-02-06 PROCEDURE — 90945 DIALYSIS ONE EVALUATION: CPT

## 2024-02-06 PROCEDURE — 36000707: Performed by: STUDENT IN AN ORGANIZED HEALTH CARE EDUCATION/TRAINING PROGRAM

## 2024-02-06 PROCEDURE — 25000003 PHARM REV CODE 250

## 2024-02-06 PROCEDURE — 99233 SBSQ HOSP IP/OBS HIGH 50: CPT | Mod: ,,, | Performed by: INTERNAL MEDICINE

## 2024-02-06 PROCEDURE — 27201423 OPTIME MED/SURG SUP & DEVICES STERILE SUPPLY: Performed by: STUDENT IN AN ORGANIZED HEALTH CARE EDUCATION/TRAINING PROGRAM

## 2024-02-06 PROCEDURE — 85027 COMPLETE CBC AUTOMATED: CPT | Performed by: STUDENT IN AN ORGANIZED HEALTH CARE EDUCATION/TRAINING PROGRAM

## 2024-02-06 PROCEDURE — 80100014 HC HEMODIALYSIS 1:1

## 2024-02-06 PROCEDURE — 94761 N-INVAS EAR/PLS OXIMETRY MLT: CPT | Mod: XB

## 2024-02-06 PROCEDURE — 63600175 PHARM REV CODE 636 W HCPCS: Performed by: HOSPITALIST

## 2024-02-06 PROCEDURE — 99900035 HC TECH TIME PER 15 MIN (STAT)

## 2024-02-06 PROCEDURE — 37000008 HC ANESTHESIA 1ST 15 MINUTES: Performed by: STUDENT IN AN ORGANIZED HEALTH CARE EDUCATION/TRAINING PROGRAM

## 2024-02-06 PROCEDURE — 36000706: Performed by: STUDENT IN AN ORGANIZED HEALTH CARE EDUCATION/TRAINING PROGRAM

## 2024-02-06 PROCEDURE — 83735 ASSAY OF MAGNESIUM: CPT

## 2024-02-06 PROCEDURE — 82140 ASSAY OF AMMONIA: CPT | Performed by: STUDENT IN AN ORGANIZED HEALTH CARE EDUCATION/TRAINING PROGRAM

## 2024-02-06 PROCEDURE — 25000003 PHARM REV CODE 250: Performed by: NURSE PRACTITIONER

## 2024-02-06 PROCEDURE — 80069 RENAL FUNCTION PANEL: CPT | Mod: 91 | Performed by: NURSE PRACTITIONER

## 2024-02-06 PROCEDURE — 63600175 PHARM REV CODE 636 W HCPCS: Mod: JZ,JG | Performed by: INTERNAL MEDICINE

## 2024-02-06 PROCEDURE — 85007 BL SMEAR W/DIFF WBC COUNT: CPT | Performed by: STUDENT IN AN ORGANIZED HEALTH CARE EDUCATION/TRAINING PROGRAM

## 2024-02-06 PROCEDURE — 82800 BLOOD PH: CPT

## 2024-02-06 PROCEDURE — 99223 1ST HOSP IP/OBS HIGH 75: CPT | Mod: ,,, | Performed by: PSYCHIATRY & NEUROLOGY

## 2024-02-06 PROCEDURE — 37799 UNLISTED PX VASCULAR SURGERY: CPT

## 2024-02-06 PROCEDURE — 82330 ASSAY OF CALCIUM: CPT | Performed by: NURSE PRACTITIONER

## 2024-02-06 RX ORDER — MIDAZOLAM HYDROCHLORIDE 1 MG/ML
INJECTION, SOLUTION INTRAMUSCULAR; INTRAVENOUS
Status: DISCONTINUED | OUTPATIENT
Start: 2024-02-06 | End: 2024-02-06

## 2024-02-06 RX ORDER — MAGNESIUM SULFATE HEPTAHYDRATE 40 MG/ML
2 INJECTION, SOLUTION INTRAVENOUS
Status: DISCONTINUED | OUTPATIENT
Start: 2024-02-06 | End: 2024-02-07

## 2024-02-06 RX ORDER — SODIUM CHLORIDE 9 MG/ML
INJECTION, SOLUTION INTRAVENOUS
Status: DISCONTINUED | OUTPATIENT
Start: 2024-02-06 | End: 2024-03-05 | Stop reason: HOSPADM

## 2024-02-06 RX ORDER — HYDROMORPHONE HYDROCHLORIDE 1 MG/ML
1 INJECTION, SOLUTION INTRAMUSCULAR; INTRAVENOUS; SUBCUTANEOUS ONCE
Status: DISCONTINUED | OUTPATIENT
Start: 2024-02-06 | End: 2024-02-07

## 2024-02-06 RX ORDER — FENTANYL CITRATE 50 UG/ML
INJECTION, SOLUTION INTRAMUSCULAR; INTRAVENOUS
Status: DISCONTINUED | OUTPATIENT
Start: 2024-02-06 | End: 2024-02-06

## 2024-02-06 RX ORDER — CEFTRIAXONE 1 G/1
INJECTION, POWDER, FOR SOLUTION INTRAMUSCULAR; INTRAVENOUS
Status: DISCONTINUED | OUTPATIENT
Start: 2024-02-06 | End: 2024-02-06

## 2024-02-06 RX ORDER — SODIUM CHLORIDE 9 MG/ML
INJECTION, SOLUTION INTRAVENOUS ONCE
Status: DISCONTINUED | OUTPATIENT
Start: 2024-02-06 | End: 2024-02-07

## 2024-02-06 RX ADMIN — INSULIN ASPART 14 UNITS: 100 INJECTION, SOLUTION INTRAVENOUS; SUBCUTANEOUS at 12:02

## 2024-02-06 RX ADMIN — FENTANYL CITRATE 100 MCG: 50 INJECTION INTRAMUSCULAR; INTRAVENOUS at 10:02

## 2024-02-06 RX ADMIN — HYDROMORPHONE HYDROCHLORIDE 0.5 MG: 1 INJECTION, SOLUTION INTRAMUSCULAR; INTRAVENOUS; SUBCUTANEOUS at 09:02

## 2024-02-06 RX ADMIN — AMLODIPINE BESYLATE 10 MG: 10 TABLET ORAL at 08:02

## 2024-02-06 RX ADMIN — MIDAZOLAM 2 MG: 1 INJECTION INTRAMUSCULAR; INTRAVENOUS at 10:02

## 2024-02-06 RX ADMIN — HYDRALAZINE HYDROCHLORIDE 10 MG: 20 INJECTION, SOLUTION INTRAMUSCULAR; INTRAVENOUS at 12:02

## 2024-02-06 RX ADMIN — MAGNESIUM SULFATE HEPTAHYDRATE 2 G: 40 INJECTION, SOLUTION INTRAVENOUS at 06:02

## 2024-02-06 RX ADMIN — HYDRALAZINE HYDROCHLORIDE 10 MG: 20 INJECTION, SOLUTION INTRAMUSCULAR; INTRAVENOUS at 03:02

## 2024-02-06 RX ADMIN — METRONIDAZOLE 500 MG: 5 INJECTION, SOLUTION INTRAVENOUS at 03:02

## 2024-02-06 RX ADMIN — HEPARIN SODIUM 7500 UNITS: 5000 INJECTION INTRAVENOUS; SUBCUTANEOUS at 02:02

## 2024-02-06 RX ADMIN — Medication 6 MG: at 10:02

## 2024-02-06 RX ADMIN — FAMOTIDINE 20 MG: 20 TABLET, FILM COATED ORAL at 08:02

## 2024-02-06 RX ADMIN — INSULIN HUMAN 1 UNITS/HR: 1 INJECTION, SOLUTION INTRAVENOUS at 08:02

## 2024-02-06 RX ADMIN — IOHEXOL 100 ML: 350 INJECTION, SOLUTION INTRAVENOUS at 03:02

## 2024-02-06 RX ADMIN — HYDROMORPHONE HYDROCHLORIDE 0.5 MG: 1 INJECTION, SOLUTION INTRAMUSCULAR; INTRAVENOUS; SUBCUTANEOUS at 04:02

## 2024-02-06 RX ADMIN — METRONIDAZOLE 500 MG: 5 INJECTION, SOLUTION INTRAVENOUS at 12:02

## 2024-02-06 RX ADMIN — ACETAMINOPHEN 650 MG: 325 TABLET ORAL at 05:02

## 2024-02-06 RX ADMIN — HYDROMORPHONE HYDROCHLORIDE 0.5 MG: 1 INJECTION, SOLUTION INTRAMUSCULAR; INTRAVENOUS; SUBCUTANEOUS at 01:02

## 2024-02-06 RX ADMIN — INSULIN ASPART 14 UNITS: 100 INJECTION, SOLUTION INTRAVENOUS; SUBCUTANEOUS at 07:02

## 2024-02-06 RX ADMIN — INSULIN ASPART 14 UNITS: 100 INJECTION, SOLUTION INTRAVENOUS; SUBCUTANEOUS at 08:02

## 2024-02-06 RX ADMIN — PSYLLIUM HUSK 1 PACKET: 3.4 POWDER ORAL at 08:02

## 2024-02-06 RX ADMIN — ACETAMINOPHEN 650 MG: 325 TABLET ORAL at 06:02

## 2024-02-06 RX ADMIN — ACETAMINOPHEN 650 MG: 325 TABLET ORAL at 11:02

## 2024-02-06 RX ADMIN — INSULIN ASPART 2 UNITS: 100 INJECTION, SOLUTION INTRAVENOUS; SUBCUTANEOUS at 04:02

## 2024-02-06 RX ADMIN — HYDRALAZINE HYDROCHLORIDE 10 MG: 20 INJECTION, SOLUTION INTRAMUSCULAR; INTRAVENOUS at 01:02

## 2024-02-06 RX ADMIN — SODIUM CHLORIDE, SODIUM GLUCONATE, SODIUM ACETATE, POTASSIUM CHLORIDE, MAGNESIUM CHLORIDE, SODIUM PHOSPHATE, DIBASIC, AND POTASSIUM PHOSPHATE: .53; .5; .37; .037; .03; .012; .00082 INJECTION, SOLUTION INTRAVENOUS at 10:02

## 2024-02-06 RX ADMIN — LABETALOL HYDROCHLORIDE 10 MG: 5 INJECTION, SOLUTION INTRAVENOUS at 04:02

## 2024-02-06 RX ADMIN — LABETALOL HYDROCHLORIDE 10 MG: 5 INJECTION, SOLUTION INTRAVENOUS at 12:02

## 2024-02-06 RX ADMIN — OXYCODONE HYDROCHLORIDE 10 MG: 5 SOLUTION ORAL at 08:02

## 2024-02-06 RX ADMIN — INSULIN ASPART 14 UNITS: 100 INJECTION, SOLUTION INTRAVENOUS; SUBCUTANEOUS at 04:02

## 2024-02-06 RX ADMIN — CEFTRIAXONE 2 G: 1 INJECTION, POWDER, FOR SOLUTION INTRAMUSCULAR; INTRAVENOUS at 11:02

## 2024-02-06 RX ADMIN — OXYCODONE HYDROCHLORIDE 10 MG: 5 SOLUTION ORAL at 05:02

## 2024-02-06 RX ADMIN — INSULIN ASPART 2 UNITS: 100 INJECTION, SOLUTION INTRAVENOUS; SUBCUTANEOUS at 12:02

## 2024-02-06 RX ADMIN — HUMAN ALBUMIN MICROSPHERES AND PERFLUTREN 0.66 MG: 10; .22 INJECTION, SOLUTION INTRAVENOUS at 03:02

## 2024-02-06 RX ADMIN — INSULIN ASPART 1 UNITS: 100 INJECTION, SOLUTION INTRAVENOUS; SUBCUTANEOUS at 08:02

## 2024-02-06 RX ADMIN — HEPARIN SODIUM 7500 UNITS: 5000 INJECTION INTRAVENOUS; SUBCUTANEOUS at 06:02

## 2024-02-06 RX ADMIN — HEPARIN SODIUM 7500 UNITS: 5000 INJECTION INTRAVENOUS; SUBCUTANEOUS at 10:02

## 2024-02-06 RX ADMIN — METRONIDAZOLE 500 MG: 5 INJECTION, SOLUTION INTRAVENOUS at 05:02

## 2024-02-06 NOTE — HPI
53 F Hx obesity currently admitted to surgical ICU as transfer from  for necrotizing fascitis s/p surgical debirdement (1/30/24) and Vascular Neurology consulted for multi territory infarcts as well as abnormal restricted diffusion on L temporal lobe concerning for possible infarct/septic emboli. Patient presented to OSH w/nausea and vomiting, right groin wound, found to in DKA. CT abdomen/pelvis w/extensive soft tissue air throughout the right groin and inguinal region extending to the RLQ, anterior abdominal wall, right proximal/medial thigh w/extensive soft tissue air concerning for gas-forming infection. S/p OR debridement for necrotizing fasciitis on 1/29 and 1/31: cx growing proteus. Hospital course c/b respiratory failure requiring intubation, ALVARADO in setting of lactic acidosis and septic shock, presumably ATN, on SLED. Nephro/Endo and ID following and she was initially cefepime and meropenem and now on CTX and metronidazole. Per documentation on 2/2 patient was spontaneously moving her extremities but had neuro change on 2/3 for which CTH and EEG ordered as well as Neurology consult.     CTH on 2/3 with hypoattenuation along left anterior temporal lobe w/encephalomalacia, bilateral centrum semiovale, left anterior corpus callosum, right caudate, subinsular region which may indicate prior infarcts. Of these, findings in the temporal lobe are most substantial. EEG showed slowing but no seizures or epileptiform discharge. While on Abx, WBC continued to uptrend now at 31K from 29K and plan was for OR today for wound vac change, possible lymph node biopsy.   MRI brain w/o contrast completed and showed several scattered punctate acute infarcts throughout the bilateral frontal lobes, centrum semiovale, basal ganglia, corpus callosum, and cerebellar hemispheres concern for septic emboli in this setting as well as larger focal locular area of diffusion restriction within the left temporal lobe anteriorly which is  prominently involving the subcortical white matter.

## 2024-02-06 NOTE — ASSESSMENT & PLAN NOTE
Transfer from [facility]. Admitted for fourniers gangrene. Initiated HD on 1/31. ALVARADO 2/2 ATN in setting of septic shock. Arrived with CR 3.8. Unknown baseline.  Plan to OR today. Net -1.3L.OR today for debridement with possible closure and wound vac placement     ALVARADO most likley ATN in setting of septic shock     Plan/Recommendation  -Will plan for intermittent HD today for clearance and volume management. Target UF 1-1.5 L as tolerated, keep MAP >65.  -Will eval RRT needs daily   -Daily RFP   -Strict I&Os  -Avoid nephrotoxins, if possible

## 2024-02-06 NOTE — TRANSFER OF CARE
"Anesthesia Transfer of Care Note    Patient: Sarah Saravia    Procedure(s) Performed: Procedure(s) (LRB):  DEBRIDEMENT, WOUND, replace wound vac, possible closure (Right)    Patient location: ICU    Anesthesia Type: general    Transport from OR: Continuous ECG monitoring in transport. Continuous SpO2 monitoring in transport. Continuos invasive BP monitoring in transport. Transported from OR intubated on 100% O2 by AMBU with assisted ventilation    Post pain: adequate analgesia    Post assessment: no apparent anesthetic complications    Post vital signs: stable    Level of consciousness: awake    Nausea/Vomiting: no nausea/vomiting    Complications: none    Transfer of care protocol was followed    Last vitals: Visit Vitals  BP (!) 156/81   Pulse 89   Temp 36.3 °C (97.3 °F)   Resp (!) 22   Ht 5' 5.98" (1.676 m)   Wt (!) 162 kg (357 lb 2.3 oz)   LMP 01/01/2024 (Approximate)   SpO2 98%   BMI 57.67 kg/m²     "

## 2024-02-06 NOTE — SUBJECTIVE & OBJECTIVE
Interval History: NAEO. CRRT clotted twice overnight. Minimal meaningful neurologic response off sedation. On and off spontaneous mechanical ventilation. WBC 32 from 29. AF, HDS    Medications:  Continuous Infusions:   calcium gluconate 3 g in dextrose 5 % (D5W) 100 mL infusion Stopped (02/06/24 0211)    dextrose 10 % in water (D10W)      dextrose-sod citrate-citric ac Stopped (02/06/24 0211)    insulin regular 1 units/mL infusion orderable (TRANSFER) Stopped (02/06/24 0414)     Scheduled Meds:   acetaminophen  650 mg Per NG tube Q6H    amLODIPine  10 mg Per OG tube Daily    cefTRIAXone (Rocephin) IV (PEDS and ADULTS)  2 g Intravenous Q24H    famotidine  20 mg Per OG tube Daily    heparin (porcine)  7,500 Units Subcutaneous Q8H    insulin aspart U-100  14 Units Subcutaneous Q4H    metronidazole  500 mg Intravenous Q8H    psyllium husk (aspartame)  1 packet Per OG tube BID     PRN Meds:sodium chloride 0.9%, albuterol-ipratropium, dextrose 10 % in water (D10W), dextrose 10%, dextrose 10%, glucagon (human recombinant), glucose, glucose, hydrALAZINE, HYDROmorphone, insulin aspart U-100, labetalol, magnesium sulfate IVPB, melatonin, naloxone, ondansetron, oxyCODONE, oxyCODONE, prochlorperazine, sodium chloride 0.9%, sodium chloride 0.9%, sodium chloride 0.9%, sodium phosphate 20.01 mmol in dextrose 5 % (D5W) 250 mL IVPB, sodium phosphate 30 mmol in dextrose 5 % (D5W) 250 mL IVPB, sodium phosphate 39.99 mmol in dextrose 5 % (D5W) 250 mL IVPB     Review of patient's allergies indicates:  No Known Allergies  Objective:     Vital Signs (Most Recent):  Temp: 99.4 °F (37.4 °C) (02/06/24 0400)  Pulse: 93 (02/06/24 0730)  Resp: (!) 23 (02/06/24 0730)  BP: 132/62 (02/06/24 0700)  SpO2: 100 % (02/06/24 0730) Vital Signs (24h Range):  Temp:  [98.3 °F (36.8 °C)-100.3 °F (37.9 °C)] 99.4 °F (37.4 °C)  Pulse:  [] 93  Resp:  [14-28] 23  SpO2:  [98 %-100 %] 100 %  BP: (132-174)/(62-94) 132/62  Arterial Line BP: (130-179)/(59-88)  130/59     Weight: (!) 162 kg (357 lb 2.3 oz)  Body mass index is 57.67 kg/m².    Intake/Output - Last 3 Shifts         02/04 0700 02/05 0659 02/05 0700 02/06 0659 02/06 0700 02/07 0659    I.V. (mL/kg) 77.4 (0.5) 150 (0.9) 16.6 (0.1)    Other  750     NG/ 300     IV Piggyback 959.8 1834.9     Total Intake(mL/kg) 1487.3 (9) 3034.9 (18.7) 16.6 (0.1)    Urine (mL/kg/hr) 160 (0) 20 (0)     Drains 15      Other 804 2738     Stool 350 70     Total Output 1329 2828     Net +158.3 +206.9 +16.6           Stool Occurrence 2 x               Physical Exam  Vitals reviewed.   Constitutional:       General: She is not in acute distress.     Appearance: She is obese. She is ill-appearing.   HENT:      Head: Normocephalic.   Neck:      Comments: Right IJ trialysis   Cardiovascular:      Rate and Rhythm: Regular rhythm.      Pulses: Normal pulses.   Pulmonary:      Effort: Pulmonary effort is normal.      Comments: Mechanically ventilated   Abdominal:      Palpations: Abdomen is soft.      Tenderness: There is no abdominal tenderness.   Genitourinary:     Comments: Glynn in place with clear yellow urine   Musculoskeletal:      Comments: Wound vac in place with adequate seal. Surrounding skin soft edematous, no crepitus    Skin:     General: Skin is warm and dry.   Neurological:      General: No focal deficit present.          Significant Labs:  I have reviewed all pertinent lab results within the past 24 hours.  CBC:   Recent Labs   Lab 02/06/24  0339   WBC 32.17*   RBC 2.98*   HGB 7.9*   HCT 23.5*      MCV 79*   MCH 26.5*   MCHC 33.6     CMP:   Recent Labs   Lab 02/05/24  0321 02/05/24  1348 02/06/24  0339   *  227*   < > 88   CALCIUM 8.2*  8.1*   < > 8.1*   ALBUMIN 1.6*  1.6*   < > 1.6*   PROT 6.8  --   --      135*   < > 136   K 3.9  3.8   < > 3.9   CO2 19*  20*   < > 23     105   < > 105   BUN 52*  52*   < > 30*   CREATININE 5.2*  5.2*   < > 3.4*   ALKPHOS 150*  --   --    ALT 15  --    --    AST 30  --   --    BILITOT 0.3  --   --     < > = values in this interval not displayed.       Significant Diagnostics:  I have reviewed all pertinent imaging results/findings within the past 24 hours.

## 2024-02-06 NOTE — ANESTHESIA PREPROCEDURE EVALUATION
[facility]  Anesthesia Pre-Operative Evaluation         Patient Name: Sarah Saravia  YOB: 1970  MRN: [id number]    SUBJECTIVE:     Pre-operative evaluation for Procedure(s) (LRB):  DEBRIDEMENT, WOUND, replace wound vac, possible closure (Right)     02/06/2024    Sarah Saravia is a 53 y.o. female w/ a significant PMHx of DKA, ALVARADO, ros's gangrene who presented to the SICU intubated and sedated. She is s/p multiple debridements.    Patient now presents for the above procedure(s).      LDA:   Trialysis (Dialysis) Catheter 01/31/24 1051 right internal jugular (Active)   Line Necessity Review CRRT/HD 02/05/24 1915   Verification by X-ray Yes 02/05/24 1915   Site Assessment No drainage;No redness;No swelling 02/06/24 0315   Line Securement Device Secured with sutures 02/05/24 1915   Dressing Type CHG impregnated dressing/sponge;Central line dressing 02/06/24 0315   Dressing Status Dry;Clean;Intact 02/06/24 0315   Dressing Intervention Integrity maintained 02/06/24 0315   Date on Dressing 02/01/24 02/05/24 1915   Dressing Due to be Changed 02/08/24 02/06/24 0315   Venous Patency/Care accessed 02/06/24 0315   Arterial Patency/Care accessed 02/06/24 0315   Distal Patency/Care infusing 02/06/24 0315   Flows Reversed 02/06/24 0315   Waveform Normal 02/06/24 0315   Number of days: 5            Peripheral IV - Single Lumen 02/04/24 2241 20 G Right Antecubital (Active)   Site Assessment Dry;Clean;Intact 02/06/24 0315   Extremity Assessment Distal to IV No abnormal discoloration;No redness;No swelling;No warmth 02/06/24 0315   Line Status Blood return noted 02/06/24 0315   Dressing Status Clean;Dry;Intact 02/06/24 0315   Dressing Intervention Integrity maintained 02/06/24 0315   Dressing Change Due 02/08/24 02/06/24 0315   Site Change Due 02/08/24 02/05/24 1915   Number of days: 1       Arterial Line 01/31/24 1025 Right Radial (Active)   Site Assessment Clean;Dry;Intact 02/06/24 8797    Line Status Pulsatile blood flow 02/06/24 0315   Art Line Waveform Appropriate;Square wave test performed 02/06/24 0315   Arterial Line Interventions Zeroed and calibrated;Leveled 02/06/24 0315   Color/Movement/Sensation Capillary refill less than 3 sec 02/06/24 0315   Dressing Type Central line dressing 02/06/24 0315   Dressing Status Clean;Dry;Intact 02/06/24 0315   Dressing Intervention Integrity maintained 02/06/24 0315   Dressing Change Due 02/08/24 02/06/24 0315   Tubing Change Due 02/08/24 02/05/24 1915   Number of days: 5            NG/OG Tube 02/01/24 2250 18 Fr. Center mouth (Active)   Placement Check placement verified by x-ray 02/06/24 0315   Tolerance no signs/symptoms of discomfort 02/06/24 0315   Securement secured to commercial device 02/06/24 0315   Clamp Status/Tolerance unclamped 02/06/24 0315   Suction Setting/Drainage Method suction at the bedside 02/06/24 0315   Insertion Site Appearance no redness, warmth, tenderness, skin breakdown, drainage 02/06/24 0315   Drainage Brown 02/02/24 1501   Flush/Irrigation flushed w/;water;no resistance met 02/06/24 0315   Feeding Type continuous;by pump 02/06/24 0315   Feeding Action feeding continued 02/06/24 0315   Current Rate (mL/hr) 30 mL/hr 02/05/24 1915   Goal Rate (mL/hr) 30 mL/hr 02/05/24 1915   Intake (mL) 30 mL 02/05/24 1101   Water Bolus (mL) 60 mL 02/05/24 1000   Tube Output(mL)(Include Discarded Residual) 15 mL 02/04/24 1901   Intake (mL) - Formula Tube Feeding 30 02/06/24 0000   Residual Amount (ml) 10 ml 02/04/24 0701   Number of days: 4            Rectal Tube 02/04/24 1545 (Active)   Outcome gas expelled 02/06/24 0315   Stool Color Brown 02/06/24 0315   Insertion Site Appearance no redness, warmth, tenderness, skin breakdown, drainage 02/06/24 0315   Flush/Irrigation flushed w/;water;no resistance met 02/06/24 0315   Rectal Tube Output 70 mL 02/05/24 1101   Number of days: 1       Prev airway:   Placement Date: 01/31/24; Placement Time: 1020  (created via procedure documentation); Method of Intubation: Video Laryngoscopy; Intubated: Postinduction; Blade: [place] #3; Airway Device Size: 7.5; Placement Verified By: Capnometry; Complicating Factors: Obesity, Short neck; Intubation Findings: Bilateral breath sounds, Atraumatic/Condition of teeth unchanged;  Depth of Insertion: 25; Securment: Gum line; Complications: None     Drips: None documented.   calcium gluconate 3 g in dextrose 5 % (D5W) 100 mL infusion Stopped (02/06/24 0211)    dextrose 10 % in water (D10W)      dextrose-sod citrate-citric ac Stopped (02/06/24 0211)    insulin regular 1 units/mL infusion orderable (TRANSFER) Stopped (02/06/24 0414)       Patient Active Problem List   Diagnosis    Diabetic acidosis without coma    Ayaz's gangrene    Morbid (severe) obesity due to excess calories    Severe sepsis    ALVARADO (acute kidney injury)    Hyponatremia    Metabolic acidosis    Encephalopathy, metabolic    Acute hypoxemic respiratory failure    Weaning from mechanically assisted ventilation initiated    Acute blood loss anemia    Acute renal failure with tubular necrosis    New onset type 2 diabetes mellitus    Encephalopathy    Leukocytosis       Review of patient's allergies indicates:  No Known Allergies    Current Inpatient Medications:   acetaminophen  650 mg Per NG tube Q6H    amLODIPine  10 mg Per OG tube Daily    cefTRIAXone (Rocephin) IV (PEDS and ADULTS)  2 g Intravenous Q24H    famotidine  20 mg Per OG tube Daily    heparin (porcine)  7,500 Units Subcutaneous Q8H    insulin aspart U-100  14 Units Subcutaneous Q4H    metronidazole  500 mg Intravenous Q8H    psyllium husk (aspartame)  1 packet Per OG tube BID       No current facility-administered medications on file prior to encounter.     No current outpatient medications on file prior to encounter.       Past Surgical History:   Procedure Laterality Date    INCISION AND DRAINAGE OF PERIRECTAL REGION N/A 1/29/2024    Procedure:  INCISION AND DRAINAGE, PERIRECTAL REGION;  Surgeon: Axel Ramsay MD;  Location: [facility] OR;  Service: General;  Laterality: N/A;    PLACEMENT, TRIALYSIS CATH Right 1/31/2024    Procedure: INSERTION, CATHETER, TRIPLE LUMEN, HEMODIALYSIS, TEMPORARY;  Surgeon: Alvin Junior MD;  Location: [facility] OR;  Service: General;  Laterality: Right;    WOUND DEBRIDEMENT Bilateral 2/2/2024    Procedure: DEBRIDEMENT, WOUND;  Surgeon: Steve Cole MD;  [facility];  Service: General;  Laterality: Bilateral;  Bilateral groin  Possible wound vac placement    WOUND EXPLORATION Right 1/31/2024    Procedure: IRRIGATION & DEBRIDEMENT, WOUND DEBRIDEMENT;  Surgeon: Alvin Junior MD;  Location: [facility] OR;  Service: General;  Laterality: Right;       Social History     Socioeconomic History    Marital status: Single     Social Determinants of Health     Financial Resource Strain: Patient Unable To Answer (1/31/2024)    Overall Financial Resource Strain (CARDIA)     Difficulty of Paying Living Expenses: Patient unable to answer   Food Insecurity: Patient Declined (1/31/2024)    Hunger Vital Sign     Worried About Running Out of Food in the Last Year: Patient declined     Ran Out of Food in the Last Year: Patient declined   Transportation Needs: Patient Unable To Answer (1/31/2024)    PRAPARE - Transportation     Lack of Transportation (Medical): Patient unable to answer     Lack of Transportation (Non-Medical): Patient unable to answer   Stress: Patient Unable To Answer (1/31/2024)    [employer] - Occupational Stress Questionnaire     Feeling of Stress : Patient unable to answer   Housing Stability: Patient Unable To Answer (1/31/2024)    Housing Stability Vital Sign     Unable to Pay for Housing in the Last Year: Patient unable to answer     Unstable Housing in the Last Year: Patient unable to answer       OBJECTIVE:     Vital Signs Range (Last 24H):  Temp:  [36.8 °C (98.3 °F)-37.9 °C (100.3  °F)]   Pulse:  []   Resp:  [15-28]   BP: (132-174)/(62-91)   SpO2:  [98 %-100 %]   Arterial Line BP: (123-179)/(56-88)       Significant Labs:  Lab Results   Component Value Date    WBC 32.17 (H) 02/06/2024    HGB 7.9 (L) 02/06/2024    HCT 23.5 (L) 02/06/2024     02/06/2024    ALT 15 02/05/2024    AST 30 02/05/2024     02/06/2024    K 3.9 02/06/2024     02/06/2024    CREATININE 3.4 (H) 02/06/2024    BUN 30 (H) 02/06/2024    CO2 23 02/06/2024    INR 1.0 02/01/2024    HGBA1C 10.4 (H) 01/30/2024       Diagnostic Studies: No relevant studies.    EKG:   Results for orders placed or performed during the hospital encounter of 01/29/24   EKG 12-lead    Collection Time: 01/29/24  7:14 PM    Narrative    Test Reason : R00.0,    Vent. Rate : 111 BPM     Atrial Rate : 111 BPM     P-R Int : 134 ms          QRS Dur : 102 ms      QT Int : 344 ms       P-R-T Axes : 070 066 -49 degrees     QTc Int : 467 ms    Sinus tachycardia  LVH with repolarization abnormality  Abnormal ECG  No previous ECGs available  Confirmed by Rubén Ortiz MD ([id number]) on 1/31/2024 6:01:21 AM    Referred By: MARAERR   SELF           Confirmed By:Rubén Ortiz MD       2D ECHO:  TTE:  No results found for this or any previous visit.    JAY:  No results found for this or any previous visit.    ASSESSMENT/PLAN:         Pre-op Assessment    I have reviewed the Patient Summary Reports.    I have reviewed the NPO Status.   I have reviewed the Medications.     Review of Systems  Anesthesia Hx:  No problems with previous Anesthesia   History of prior surgery of interest to airway management or planning:  Previous anesthesia: General        Denies Family Hx of Anesthesia complications.     Renal/:  Chronic Renal Disease        Kidney Function/Disease             Endocrine:  Diabetes    Diabetes                          Physical Exam  General: Well nourished    Airway:  Mallampati: unable to assess   Pre-Existing Airway: Oral  Endotracheal tube        Anesthesia Plan  Type of Anesthesia, risks & benefits discussed:    Anesthesia Type: Gen ETT  Intra-op Monitoring Plan: Standard ASA Monitors  Post Op Pain Control Plan: multimodal analgesia and IV/PO Opioids PRN  Induction:  IV  Airway Plan: Direct and Video, Post-Induction  Informed Consent: Informed consent signed with the Patient and all parties understand the risks and agree with anesthesia plan.  All questions answered.   ASA Score: 4  Day of Surgery Review of History & Physical: H&P Update referred to the surgeon/provider.    Ready For Surgery From Anesthesia Perspective.     .

## 2024-02-06 NOTE — CONSULTS
[facility] - Surgical Intensive Care  Vascular Neurology  Comprehensive Stroke Center  Consult Note    Inpatient consult to Vascular (Stroke) Neurology  Consult performed by: Shala Stafford MD  Consult ordered by: Shala Stafford MD        Assessment/Plan:     Patient is a 53 y.o. year old female with:    Ac isch multi vasc territories stroke  53 F Hx obesity currently admitted to surgical ICU as transfer from  for necrotizing fascitis s/p surgical debirdement (1/30/24) and Vascular Neurology consulted for multi territory infarcts as well as abnormal restricted diffusion on L temporal lobe concerning for possible infarct/septic emboli. Patient is currently s/p surgical debridement of R groin nec fasc w + cx proteus as well as s/p wound vac. Gen Neuro originally consulted due to worsening neuro exam on 2/3 and CTH showed  hypoattenuations at left anterior temporal lobe w/encephalomalacia, and bilateral centrum semiovale, left anterior corpus callosum, right caudate, subinsular region. In the interim patient having worsening leukocytosis up to 31K today while on CTX and metronidazole with clear cultures. MRI brain 2/5/24 showed several scattered punctate acute infarcts at BL frontal lobes, centrum semiovale, basal ganglia, corpus callosum, and cerebellar hemispheres as well as focal locunar area of diffusion restriction at L temporal lobe.     Given patient's current clinical scenario with on going infection, worsening leukocytosis concerned for embolic etiology likely septic and will need cardiac workup with TTE first to investigate for vegetation. Will also need vessel imaging to rule out mycotic aneurysm an to further investigate abnormal temporal findings, can not get gadolinium per radiology so recommending CTA h/n for now. Per radiology CNS infection/encephalitis possible, will discuss LP with neurology based on the above results.   Will need to hold on AC as if these are infectious higher  risk of bleeding.       Antithrombotics for secondary stroke prevention: Antiplatelets: None: Risk of bleeding and need to rule out mycotic aneurysm     Statins for secondary stroke prevention and hyperlipidemia, if present:   Statins: Atorvastatin- 40 mg daily    Aggressive risk factor modification: HTN, HLD, Obesity, infection      Rehab efforts: The patient has been evaluated by a stroke team provider and the therapy needs have been fully considered based off the presenting complaints and exam findings. The following therapy evaluations are needed:  when able     Diagnostics ordered/pending: CTA Head to assess vasculature , CTA Neck/Arch to assess vasculature, TTE to assess cardiac function/status, depending on TTE will consider JAY.     VTE prophylaxis: None: Reason for No Pharmacological VTE Prophylaxis: Bacterial endocarditis concern     BP parameters: Infarct: normotension         New onset type 2 diabetes mellitus  Stroke RF   Endo following   - high risk of immunocompromise state         STROKE DOCUMENTATION          NIH Scale:  1a. Level of Consciousness: 2-->Not alert, requires repeated stimulation to attend, or is obtunded and requires strong or painful stimulation to make movements (not stereotyped)  1b. LOC Questions: 2-->Answers neither question correctly  1c. LOC Commands: 2-->Performs neither task correctly  2. Best Gaze: 0-->Normal  3. Visual: 0-->No visual loss  4. Facial Palsy: 0-->Normal symmetrical movements  5a. Motor Arm, Left: 4-->No movement  5b. Motor Arm, Right: 4-->No movement  6a. Motor Leg, Left: 4-->No movement  6b. Motor Leg, Right: 4-->No movement  7. Limb Ataxia: 0-->Absent  8. Sensory: 1-->Mild-to-moderate sensory loss, patient feels pinprick is less sharp or is dull on the affected side, or there is a loss of superficial pain with pinprick, but patient is aware of being touched  9. Best Language: 3-->Mute, global aphasia, no usable speech or auditory comprehension  10.  Dysarthria: (UN) Intubated or other physical barrier  11. Extinction and Inattention (formerly Neglect): 2-->Profound lamar-inattention/extinction more than 1 modality  Total (NIH Stroke Scale): 28    Modified [place]    Dwight Coma Scale:    ABCD2 Score:    [id number] Score:   HAS -BLED Score:   ICH Score:   Hunt & Miles Classification:       Thrombolysis Candidate? No, Out of window - Symptom onset > 4.5 hours    Delays to Thrombolysis?  No    Interventional Revascularization Candidate?   Is the patient eligible for mechanical endovascular reperfusion (MAGDALENA)?  No; Large core infarct on imaging     Delays to Thrombectomy? No    Hemorrhagic change of an Ischemic Stroke: Does this patient have an ischemic stroke with hemorrhagic changes? No     Subjective:     History of Present Illness:  53 F Hx obesity currently admitted to surgical ICU as transfer from  for necrotizing fascitis s/p surgical debirdement (1/30/24) and Vascular Neurology consulted for multi territory infarcts as well as abnormal restricted diffusion on L temporal lobe concerning for possible infarct/septic emboli. Patient presented to OSH w/nausea and vomiting, right groin wound, found to in DKA. CT abdomen/pelvis w/extensive soft tissue air throughout the right groin and inguinal region extending to the RLQ, anterior abdominal wall, right proximal/medial thigh w/extensive soft tissue air concerning for gas-forming infection. S/p OR debridement for necrotizing fasciitis on 1/29 and 1/31: cx growing proteus. Hospital course c/b respiratory failure requiring intubation, ALVARADO in setting of lactic acidosis and septic shock, presumably ATN, on SLED. Nephro/Endo and ID following and she was initially cefepime and meropenem and now on CTX and metronidazole. Per documentation on 2/2 patient was spontaneously moving her extremities but had neuro change on 2/3 for which CTH and EEG ordered as well as Neurology consult.     CTH on 2/3 with hypoattenuation along  left anterior temporal lobe w/encephalomalacia, bilateral centrum semiovale, left anterior corpus callosum, right caudate, subinsular region which may indicate prior infarcts. Of these, findings in the temporal lobe are most substantial. EEG showed slowing but no seizures or epileptiform discharge. While on Abx, WBC continued to uptrend now at 31K from 29K and plan was for OR today for wound vac change, possible lymph node biopsy.   MRI brain w/o contrast completed and showed several scattered punctate acute infarcts throughout the bilateral frontal lobes, centrum semiovale, basal ganglia, corpus callosum, and cerebellar hemispheres concern for septic emboli in this setting as well as larger focal locular area of diffusion restriction within the left temporal lobe anteriorly which is prominently involving the subcortical white matter.           History reviewed. No pertinent past medical history.  Past Surgical History:   Procedure Laterality Date    INCISION AND DRAINAGE OF PERIRECTAL REGION N/A 1/29/2024    Procedure: INCISION AND DRAINAGE, PERIRECTAL REGION;  Surgeon: Axel Ramsay MD;  Location: [facility] OR;  Service: General;  Laterality: N/A;    PLACEMENT, TRIALYSIS CATH Right 1/31/2024    Procedure: INSERTION, CATHETER, TRIPLE LUMEN, HEMODIALYSIS, TEMPORARY;  Surgeon: Alvin Junior MD;  Location: [facility] OR;  Service: General;  Laterality: Right;    WOUND DEBRIDEMENT Bilateral 2/2/2024    Procedure: DEBRIDEMENT, WOUND;  Surgeon: Steve Cole MD;  Location: [facility] OR [address] [place] [address];  Service: General;  Laterality: Bilateral;  Bilateral groin  Possible wound vac placement    WOUND EXPLORATION Right 1/31/2024    Procedure: IRRIGATION & DEBRIDEMENT, WOUND DEBRIDEMENT;  Surgeon: Alvin Junior MD;  Location: [facility] OR;  Service: General;  Laterality: Right;        Review of patient's allergies indicates:  No Known Allergies    Medications: I have reviewed the current medication administration record.    No medications  "prior to admission.       Review of Systems   Unable to perform ROS: Intubated     Objective:     Vital Signs (Most Recent):  Temp: 97.4 °F (36.3 °C) (02/06/24 1215)  Pulse: 101 (02/06/24 1430)  Resp: 20 (02/06/24 1430)  BP: (!) 174/81 (02/06/24 1400)  SpO2: 100 % (02/06/24 1430)    Vital Signs Range (Last 24H):  Temp:  [97.3 °F (36.3 °C)-100.3 °F (37.9 °C)]   Pulse:  []   Resp:  [15-30]   BP: (132-197)/(62-94)   SpO2:  [95 %-100 %]   Arterial Line BP: (123-205)/(56-91)        Physical Exam  Constitutional:       Appearance: She is obese.      Comments: Opens eyes to tactile stimuli   Eyes:      Pupils: Pupils are equal, round, and reactive to light.   Cardiovascular:      Rate and Rhythm: Normal rate.   Pulmonary:      Effort: No respiratory distress.   Neurological:      Comments: Eyes open to pain   No spontaneous movement   W/d to pain all 4 extremities               Laboratory:  CMP:   Recent Labs   Lab 02/06/24  1418   CALCIUM 8.2*   ALBUMIN 1.7*      K 4.3   CO2 19*      BUN 39*   CREATININE 4.1*     CBC:   Recent Labs   Lab 02/06/24  0339   WBC 32.17*   RBC 2.98*   HGB 7.9*   HCT 23.5*      MCV 79*   MCH 26.5*   MCHC 33.6     Lipid Panel: No results for input(s): "CHOL", "LDLCALC", "HDL", "TRIG" in the last 168 hours.  Coagulation:   Recent Labs   Lab 02/01/24  2246   INR 1.0   APTT 27.8     Hgb A1C: No results for input(s): "HGBA1C" in the last 168 hours.  TSH: No results for input(s): "TSH" in the last 168 hours.    Diagnostic Results:      Brain imaging:          Impression:     Several scattered punctate acute infarcts throughout the bilateral frontal lobes, centrum semiovale, basal ganglia, corpus callosum, and cerebellar hemispheres.  Overall distribution is concerning for embolic etiology (septic emboli to be considered in light of clinical history of infection).     Please note this is somewhat confounded by larger focal locular area of diffusion restriction within the left " temporal lobe anteriorly which is prominently involving the subcortical white matter.  While this may be unusual possible venous additional area of acute infarction sequela of tumefactive demyelination a consideration with infectious process with encephalitis/abscess not excluded.     Clinical correlation and further evaluation with post-contrast MRI brain if patient compatible.    Cardiac Evaluation:   ECHO pending       Shala Mccoy MD  [facility] St[facility]  Department of Vascular Neurology   [facility] - Surgical Intensive Care

## 2024-02-06 NOTE — ASSESSMENT & PLAN NOTE
I have reviewed hospital notes from  SICU service and other specialty providers. I have also reviewed CBC, CMP/BMP,  cultures and imaging with my interpretation as documented.     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 Operative cultures showing P mirabilis. She is afebrile, and with ongoing leukocytosis.   Continue ceftriaxone 2 gm IV daily.  Continue metronidazole.   Will follow up culture results.  Surgical debridements as indicated for non viable tissue as per Surgical Service.  Discussed management plan with the staff and/or members from SICU and General Surgery service.

## 2024-02-06 NOTE — SUBJECTIVE & OBJECTIVE
Interval History:   8 out 12 hrs of SLED completed this AM. CRRT clotted twice overnight. CVP 9. Net positive 200 mL/24 hrs.   Remain intubated at 40% FiO2. Electrolytes non emergent. OR today for wound vac change.     Review of patient's allergies indicates:  No Known Allergies  Current Facility-Administered Medications   Medication Frequency    0.9%  NaCl infusion PRN    acetaminophen tablet 650 mg Q6H    albuterol-ipratropium 2.5 mg-0.5 mg/3 mL nebulizer solution 3 mL Q4H PRN    amLODIPine tablet 10 mg Daily    calcium gluconate 3 g in dextrose 5 % (D5W) 100 mL infusion Continuous    cefTRIAXone (ROCEPHIN) 2 g in dextrose 5 % in water (D5W) 100 mL IVPB (MB+) Q24H    dextrose 10 % infusion Continuous PRN    dextrose 10% bolus 125 mL 125 mL PRN    dextrose 10% bolus 250 mL 250 mL PRN    dextrose-sod citrate-citric ac 2.45-2.2 gram- 800 mg/100 mL Soln Continuous    famotidine tablet 20 mg Daily    glucagon (human recombinant) injection 1 mg PRN    glucose chewable tablet 16 g PRN    glucose chewable tablet 24 g PRN    heparin (porcine) injection 7,500 Units Q8H    hydrALAZINE injection 10 mg Q4H PRN    HYDROmorphone injection 0.5 mg Q4H PRN    insulin aspart U-100 pen 0-10 Units Q4H PRN    insulin aspart U-100 pen 14 Units Q4H    insulin regular in 0.9 % NaCl 100 unit/100 mL (1 unit/mL) infusion Continuous    labetalol 20 mg/4 mL (5 mg/mL) IV syring Q6H PRN    magnesium sulfate 2g in water 50mL IVPB (premix) PRN    melatonin tablet 6 mg Nightly PRN    metronidazole IVPB 500 mg Q8H    naloxone 0.4 mg/mL injection 0.02 mg PRN    ondansetron injection 4 mg Q6H PRN    oxyCODONE 5 mg/5 mL solution 10 mg Q6H PRN    oxyCODONE 5 mg/5 mL solution 5 mg Q6H PRN    prochlorperazine injection Soln 5 mg Q6H PRN    psyllium husk (aspartame) 3.4 gram packet 1 packet BID    sodium chloride 0.9% bolus 250 mL 250 mL PRN    sodium chloride 0.9% bolus 250 mL 250 mL PRN    sodium chloride 0.9% flush 10 mL PRN    sodium phosphate 20.01  mmol in dextrose 5 % (D5W) 250 mL IVPB PRN    sodium phosphate 30 mmol in dextrose 5 % (D5W) 250 mL IVPB PRN    sodium phosphate 39.99 mmol in dextrose 5 % (D5W) 250 mL IVPB PRN     Facility-Administered Medications Ordered in Other Encounters   Medication Frequency    cefTRIAXone injection PRN    fentaNYL 50 mcg/mL injection PRN    isolyte infusion Continuous PRN    midazolam injection PRN       Objective:     Vital Signs (Most Recent):  Temp: 98.8 °F (37.1 °C) (02/06/24 0730)  Pulse: 97 (02/06/24 0748)  Resp: 20 (02/06/24 0807)  BP: 132/62 (02/06/24 0700)  SpO2: 100 % (02/06/24 0748) Vital Signs (24h Range):  Temp:  [98.8 °F (37.1 °C)-100.3 °F (37.9 °C)] 98.8 °F (37.1 °C)  Pulse:  [] 97  Resp:  [16-28] 20  SpO2:  [98 %-100 %] 100 %  BP: (132-174)/(62-91) 132/62  Arterial Line BP: (123-178)/(56-88) 161/68     Weight: (!) 162 kg (357 lb 2.3 oz) (02/06/24 0300)  Body mass index is 57.67 kg/m².  Body surface area is 2.75 meters squared.    I/O last 3 completed shifts:  In: 3690.5 [I.V.:171.6; Other:750; NG/GT:690; IV Piggyback:2078.9]  Out: 3308 [Urine:85; Drains:15; Other:2788; Stool:420]     Physical Exam  Vitals and nursing note reviewed.   Constitutional:       General: She is not in acute distress.     Appearance: She is obese. She is not ill-appearing or toxic-appearing.      Interventions: She is sedated and intubated.   Eyes:      General: No scleral icterus.        Right eye: No discharge.         Left eye: No discharge.   Cardiovascular:      Rate and Rhythm: Normal rate.   Pulmonary:      Effort: She is intubated.      Comments: Vent Mode: A/C  Oxygen Concentration (%):  [40] 40  Resp Rate Total:  [19 br/min-32 br/min] 22 br/min  Vt Set:  [420 mL] 420 mL  PEEP/CPAP:  [5 cmH20] 5 cmH20  Mean Airway Pressure:  [6.5 ufY73-09 cmH20] 11 cmH20      Abdominal:      General: There is distension.   Musculoskeletal:      Right lower leg: Edema present.      Left lower leg: Edema present.          Significant  Labs:  CBC:   Recent Labs   Lab 02/06/24  0339   WBC 32.17*   RBC 2.98*   HGB 7.9*   HCT 23.5*      MCV 79*   MCH 26.5*   MCHC 33.6     CMP:   Recent Labs   Lab 02/05/24  0321 02/05/24  1348 02/06/24  0339   *  227*   < > 88   CALCIUM 8.2*  8.1*   < > 8.1*   ALBUMIN 1.6*  1.6*   < > 1.6*   PROT 6.8  --   --      135*   < > 136   K 3.9  3.8   < > 3.9   CO2 19*  20*   < > 23     105   < > 105   BUN 52*  52*   < > 30*   CREATININE 5.2*  5.2*   < > 3.4*   ALKPHOS 150*  --   --    ALT 15  --   --    AST 30  --   --    BILITOT 0.3  --   --     < > = values in this interval not displayed.     All labs within the past 24 hours have been reviewed.

## 2024-02-06 NOTE — PROGRESS NOTES
[facility]  Nephrology  Progress Note    Patient Name: Sarah Saravia  MRN: [id number]  Admission Date: 1/29/2024  Hospital Length of Stay: 8 days  Attending Provider: Steve Cole MD   Primary Care Physician: [facility]  Principal Problem:Ayaz's gangrene    Subjective:     Interval History:   8 out 12 hrs of SLED completed this AM. CRRT clotted twice overnight. CVP 9. Net positive 200 mL/24 hrs.   Remain intubated at 40% FiO2. Electrolytes non emergent. OR today for wound vac change.     Review of patient's allergies indicates:  No Known Allergies  Current Facility-Administered Medications   Medication Frequency    0.9%  NaCl infusion PRN    acetaminophen tablet 650 mg Q6H    albuterol-ipratropium 2.5 mg-0.5 mg/3 mL nebulizer solution 3 mL Q4H PRN    amLODIPine tablet 10 mg Daily    calcium gluconate 3 g in dextrose 5 % (D5W) 100 mL infusion Continuous    cefTRIAXone (ROCEPHIN) 2 g in dextrose 5 % in water (D5W) 100 mL IVPB (MB+) Q24H    dextrose 10 % infusion Continuous PRN    dextrose 10% bolus 125 mL 125 mL PRN    dextrose 10% bolus 250 mL 250 mL PRN    dextrose-sod citrate-citric ac 2.45-2.2 gram- 800 mg/100 mL Soln Continuous    famotidine tablet 20 mg Daily    glucagon (human recombinant) injection 1 mg PRN    glucose chewable tablet 16 g PRN    glucose chewable tablet 24 g PRN    heparin (porcine) injection 7,500 Units Q8H    hydrALAZINE injection 10 mg Q4H PRN    HYDROmorphone injection 0.5 mg Q4H PRN    insulin aspart U-100 pen 0-10 Units Q4H PRN    insulin aspart U-100 pen 14 Units Q4H    insulin regular in 0.9 % NaCl 100 unit/100 mL (1 unit/mL) infusion Continuous    labetalol 20 mg/4 mL (5 mg/mL) IV syring Q6H PRN    magnesium sulfate 2g in water 50mL IVPB (premix) PRN    melatonin tablet 6 mg Nightly PRN    metronidazole IVPB 500 mg Q8H    naloxone 0.4 mg/mL injection 0.02 mg PRN    ondansetron injection 4 mg Q6H PRN    oxyCODONE 5 mg/5 mL  solution 10 mg Q6H PRN    oxyCODONE 5 mg/5 mL solution 5 mg Q6H PRN    prochlorperazine injection Soln 5 mg Q6H PRN    psyllium husk (aspartame) 3.4 gram packet 1 packet BID    sodium chloride 0.9% bolus 250 mL 250 mL PRN    sodium chloride 0.9% bolus 250 mL 250 mL PRN    sodium chloride 0.9% flush 10 mL PRN    sodium phosphate 20.01 mmol in dextrose 5 % (D5W) 250 mL IVPB PRN    sodium phosphate 30 mmol in dextrose 5 % (D5W) 250 mL IVPB PRN    sodium phosphate 39.99 mmol in dextrose 5 % (D5W) 250 mL IVPB PRN     Facility-Administered Medications Ordered in Other Encounters   Medication Frequency    cefTRIAXone injection PRN    fentaNYL 50 mcg/mL injection PRN    isolyte infusion Continuous PRN    midazolam injection PRN       Objective:     Vital Signs (Most Recent):  Temp: 98.8 °F (37.1 °C) (02/06/24 0730)  Pulse: 97 (02/06/24 0748)  Resp: 20 (02/06/24 0807)  BP: 132/62 (02/06/24 0700)  SpO2: 100 % (02/06/24 0748) Vital Signs (24h Range):  Temp:  [98.8 °F (37.1 °C)-100.3 °F (37.9 °C)] 98.8 °F (37.1 °C)  Pulse:  [] 97  Resp:  [16-28] 20  SpO2:  [98 %-100 %] 100 %  BP: (132-174)/(62-91) 132/62  Arterial Line BP: (123-178)/(56-88) 161/68     Weight: (!) 162 kg (357 lb 2.3 oz) (02/06/24 0300)  Body mass index is 57.67 kg/m².  Body surface area is 2.75 meters squared.    I/O last 3 completed shifts:  In: 3690.5 [I.V.:171.6; Other:750; NG/GT:690; IV Piggyback:2078.9]  Out: 3308 [Urine:85; Drains:15; Other:2788; Stool:420]     Physical Exam  Vitals and nursing note reviewed.   Constitutional:       General: She is not in acute distress.     Appearance: She is obese. She is not ill-appearing or toxic-appearing.      Interventions: She is sedated and intubated.   Eyes:      General: No scleral icterus.        Right eye: No discharge.         Left eye: No discharge.   Cardiovascular:      Rate and Rhythm: Normal rate.   Pulmonary:      Effort: She is intubated.      Comments: Vent Mode: A/C  Oxygen Concentration (%):   [40] 40  Resp Rate Total:  [19 br/min-32 br/min] 22 br/min  Vt Set:  [420 mL] 420 mL  PEEP/CPAP:  [5 cmH20] 5 cmH20  Mean Airway Pressure:  [6.5 qyW44-36 cmH20] 11 cmH20      Abdominal:      General: There is distension.   Musculoskeletal:      Right lower leg: Edema present.      Left lower leg: Edema present.          Significant Labs:  CBC:   Recent Labs   Lab 02/06/24  0339   WBC 32.17*   RBC 2.98*   HGB 7.9*   HCT 23.5*      MCV 79*   MCH 26.5*   MCHC 33.6     CMP:   Recent Labs   Lab 02/05/24  0321 02/05/24  1348 02/06/24  0339   *  227*   < > 88   CALCIUM 8.2*  8.1*   < > 8.1*   ALBUMIN 1.6*  1.6*   < > 1.6*   PROT 6.8  --   --      135*   < > 136   K 3.9  3.8   < > 3.9   CO2 19*  20*   < > 23     105   < > 105   BUN 52*  52*   < > 30*   CREATININE 5.2*  5.2*   < > 3.4*   ALKPHOS 150*  --   --    ALT 15  --   --    AST 30  --   --    BILITOT 0.3  --   --     < > = values in this interval not displayed.     All labs within the past 24 hours have been reviewed.   Assessment/Plan:     Renal/  * Ayaz's gangrene  -management per primary     ALVARADO (acute kidney injury)  Transfer from [facility]. Admitted for fourniers gangrene. Initiated HD on 1/31. ALVARADO 2/2 ATN in setting of septic shock. Arrived with CR 3.8. Unknown baseline.  Plan to OR today. Net -1.3L.OR today for debridement with possible closure and wound vac placement     ALVARADO most likley ATN in setting of septic shock     Plan/Recommendation  -Will plan for intermittent HD today for clearance and volume management. Target UF 1-1.5 L as tolerated, keep MAP >65.  -Will eval RRT needs daily   -Daily RFP   -Strict I&Os  -Avoid nephrotoxins, if possible         Thank you for your consult. I will follow-up with patient. Please contact us if you have any additional questions.    Shwetha Gregory DNP, FNP-C  Nephrology  [facility]

## 2024-02-06 NOTE — SUBJECTIVE & OBJECTIVE
History reviewed. No pertinent past medical history.  Past Surgical History:   Procedure Laterality Date    INCISION AND DRAINAGE OF PERIRECTAL REGION N/A 1/29/2024    Procedure: INCISION AND DRAINAGE, PERIRECTAL REGION;  Surgeon: Axel Ramsay MD;  Location: [facility] OR;  Service: General;  Laterality: N/A;    PLACEMENT, TRIALYSIS CATH Right 1/31/2024    Procedure: INSERTION, CATHETER, TRIPLE LUMEN, HEMODIALYSIS, TEMPORARY;  Surgeon: Alvin Junior MD;  Location: [facility] OR;  Service: General;  Laterality: Right;    WOUND DEBRIDEMENT Bilateral 2/2/2024    Procedure: DEBRIDEMENT, WOUND;  Surgeon: Steve Cole MD;  Location: [facility] OR 2ND FLR;  Service: General;  Laterality: Bilateral;  Bilateral groin  Possible wound vac placement    WOUND EXPLORATION Right 1/31/2024    Procedure: IRRIGATION & DEBRIDEMENT, WOUND DEBRIDEMENT;  Surgeon: Alvin Junior MD;  Location: [facility] OR;  Service: General;  Laterality: Right;        Review of patient's allergies indicates:  No Known Allergies    Medications: I have reviewed the current medication administration record.    No medications prior to admission.       Review of Systems   Unable to perform ROS: Intubated     Objective:     Vital Signs (Most Recent):  Temp: 97.4 °F (36.3 °C) (02/06/24 1215)  Pulse: 101 (02/06/24 1430)  Resp: 20 (02/06/24 1430)  BP: (!) 174/81 (02/06/24 1400)  SpO2: 100 % (02/06/24 1430)    Vital Signs Range (Last 24H):  Temp:  [97.3 °F (36.3 °C)-100.3 °F (37.9 °C)]   Pulse:  []   Resp:  [15-30]   BP: (132-197)/(62-94)   SpO2:  [95 %-100 %]   Arterial Line BP: (123-205)/(56-91)        Physical Exam  Constitutional:       Appearance: She is obese.      Comments: Opens eyes to tactile stimuli   Eyes:      Pupils: Pupils are equal, round, and reactive to light.   Cardiovascular:      Rate and Rhythm: Normal rate.   Pulmonary:      Effort: No respiratory distress.   Neurological:      Comments: Eyes open to pain   No spontaneous movement   W/d to  "pain all 4 extremities               Laboratory:  CMP:   Recent Labs   Lab 02/06/24  1418   CALCIUM 8.2*   ALBUMIN 1.7*      K 4.3   CO2 19*      BUN 39*   CREATININE 4.1*     CBC:   Recent Labs   Lab 02/06/24  0339   WBC 32.17*   RBC 2.98*   HGB 7.9*   HCT 23.5*      MCV 79*   MCH 26.5*   MCHC 33.6     Lipid Panel: No results for input(s): "CHOL", "LDLCALC", "HDL", "TRIG" in the last 168 hours.  Coagulation:   Recent Labs   Lab 02/01/24  2246   INR 1.0   APTT 27.8     Hgb A1C: No results for input(s): "HGBA1C" in the last 168 hours.  TSH: No results for input(s): "TSH" in the last 168 hours.    Diagnostic Results:      Brain imaging:          Impression:     Several scattered punctate acute infarcts throughout the bilateral frontal lobes, centrum semiovale, basal ganglia, corpus callosum, and cerebellar hemispheres.  Overall distribution is concerning for embolic etiology (septic emboli to be considered in light of clinical history of infection).     Please note this is somewhat confounded by larger focal locular area of diffusion restriction within the left temporal lobe anteriorly which is prominently involving the subcortical white matter.  While this may be unusual possible venous additional area of acute infarction sequela of tumefactive demyelination a consideration with infectious process with encephalitis/abscess not excluded.     Clinical correlation and further evaluation with post-contrast MRI brain if patient compatible.    Cardiac Evaluation:   ECHO pending     "

## 2024-02-06 NOTE — SUBJECTIVE & OBJECTIVE
Interval History/Significant Events: NAEON. HDS. Underwent CRRT yesterday. To OR today for wound vac change.    Follow-up For: Procedure(s) (LRB):  DEBRIDEMENT, WOUND (Bilateral)    Post-Operative Day: 4 Days Post-Op    Objective:     Vital Signs (Most Recent):  Temp: 99.6 °F (37.6 °C) (02/06/24 0000)  Pulse: 93 (02/06/24 0538)  Resp: (!) 27 (02/06/24 0445)  BP: (!) 164/74 (02/06/24 0400)  SpO2: 98 % (02/06/24 0445) Vital Signs (24h Range):  Temp:  [98.2 °F (36.8 °C)-100.3 °F (37.9 °C)] 99.6 °F (37.6 °C)  Pulse:  [] 93  Resp:  [14-28] 27  SpO2:  [98 %-100 %] 98 %  BP: (137-174)/(65-94) 164/74  Arterial Line BP: (130-179)/(62-88) 167/69     Weight: (!) 162 kg (357 lb 2.3 oz)  Body mass index is 57.67 kg/m².      Intake/Output Summary (Last 24 hours) at 2/6/2024 0628  Last data filed at 2/6/2024 0530  Gross per 24 hour   Intake 3034.87 ml   Output 2828 ml   Net 206.87 ml          Physical Exam  Vitals reviewed.   Constitutional:       Appearance: She is obese.   HENT:      Head: Normocephalic.      Right Ear: Tympanic membrane normal.      Nose: Nose normal.      Mouth/Throat:      Mouth: Mucous membranes are moist.   Eyes:      Pupils: Brisk Corneal and Pupillary Reflexes   Cardiovascular:      Rate and Rhythm: Normal rate and regular rhythm.   Pulmonary:      Effort: Pulmonary effort is normal.      Breath sounds: Normal breath sounds.      Intubated  Abdominal:      General: Abdomen is flat.      Palpations: Abdomen is soft.   Genitourinary:     General: Normal vulva.   Musculoskeletal:         General: Normal range of motion.   Skin:     General: Skin is warm.      Capillary Refill: Capillary refill takes less than 2 seconds.   Neurological:      Mental Status: She is alert.      Comments: On the vent; withdraws to pain but eyes not opening to pain  Psychiatric:         Mood and Affect: Mood normal       Vents:  Vent Mode: A/C (02/06/24 0349)  Set Rate: 18 BPM (02/06/24 0349)  Vt Set: 420 mL (02/06/24  0349)  Pressure Support: 10 cmH20 (02/05/24 2331)  PEEP/CPAP: 5 cmH20 (02/06/24 0349)  Oxygen Concentration (%): 40 (02/06/24 0349)  Peak Airway Pressure: 8.8 cmH20 (02/06/24 0349)  Plateau Pressure: 15 cmH20 (02/06/24 0349)  Total Ve: 11.4 L/m (02/06/24 0349)  Negative Inspiratory Force (cm H2O): 0 (02/06/24 0349)  F/VT Ratio<105 (RSBI): (!) 62.5 (02/06/24 0349)    Lines/Drains/Airways       Central Venous Catheter Line  Duration             Trialysis (Dialysis) Catheter 01/31/24 1051 right internal jugular 5 days              Drain  Duration                  NG/OG Tube 02/01/24 2250 18 Fr. Center mouth 4 days         Rectal Tube 02/04/24 1545 1 day              Airway  Duration                  Airway - Non-Surgical 01/31/24 1020 5 days              Arterial Line  Duration             Arterial Line 01/31/24 1025 Right Radial 5 days              Peripheral Intravenous Line  Duration                  Peripheral IV - Single Lumen 02/04/24 2241 20 G Right Antecubital 1 day                    Significant Labs:    CBC/Anemia Profile:  Recent Labs   Lab 02/05/24 0321 02/06/24 0339   WBC 29.32* 32.17*   HGB 8.3* 7.9*   HCT 24.6* 23.5*    246   MCV 77* 79*   RDW 15.1* 15.8*        Chemistries:  Recent Labs   Lab 02/05/24 0321 02/05/24  1348 02/05/24 2259 02/06/24 0339     135* 136 137 136   K 3.9  3.8 3.8 4.1 3.9     105 106 106 105   CO2 19*  20* 19* 21* 23   BUN 52*  52* 63* 35* 30*   CREATININE 5.2*  5.2* 5.8* 3.5* 3.4*   CALCIUM 8.2*  8.1* 8.4* 8.1* 8.1*   ALBUMIN 1.6*  1.6* 1.6* 1.7* 1.6*   PROT 6.8  --   --   --    BILITOT 0.3  --   --   --    ALKPHOS 150*  --   --   --    ALT 15  --   --   --    AST 30  --   --   --    MG 2.1 2.2 1.9 1.8   PHOS 4.3  4.3 4.8* 2.8 3.1  3.1       None    Significant Imaging:  I have reviewed all pertinent imaging results/findings within the past 24 hours.

## 2024-02-06 NOTE — PROGRESS NOTES
[facility] - Surgical Intensive Care  Infectious Disease  Progress Note    Patient Name: Sarah Saravia  MRN: [id number]  Admission Date: 1/29/2024  Length of Stay: 8 days  Attending Physician: Steve Cole MD  Primary Care Provider: [facility]    Isolation Status: No active isolations  Assessment/Plan:      Neuro  Encephalopathy  Not responsive to verbal, tactile or noxious stimuli during my examination. MRI with multiple acute infarcts concerning for embolic phenomena. No evidence of bacteremia.   Agree with TTE.  Further management per primary.    Renal/  * Ayaz's gangrene  I have reviewed hospital notes from  SICU service and other specialty providers. I have also reviewed CBC, CMP/BMP,  cultures and imaging with my interpretation as documented.     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 Operative cultures showing P mirabilis. She is afebrile, and with ongoing leukocytosis.   Continue ceftriaxone 2 gm IV daily.  Continue metronidazole.   Will follow up culture results.  Surgical debridements as indicated for non viable tissue as per Surgical Service.  Discussed management plan with the staff and/or members from SICU and General Surgery service.           Anticipated Disposition: per primary    Thank you for your consult. I will follow-up with patient. Please contact us if you have any additional questions.    Devika Andujar MD  Infectious Disease  [facility] - Surgical Intensive Care    50 minutes of total time spent on the encounter, which includes face to face time and non-face to face time preparing to see the patient (eg, review of tests), obtaining and/or reviewing separately obtained history, documenting clinical information in the electronic or other health record, independently interpreting results (not separately reported) and communicating results to the patient/family/caregiver, or care coordination (not separately reported).     Subjective:     Principal  Problem:Ayaz's gangrene    HPI: A 53-year-old woman with morbid obesity as evidenced by a BMI of 50 3 (documented as 58 at [facility]) who originally presented to [facility] with a wound to her posterior thigh, vomiting, and diarrhea for 3-4 days prior to admission.  On evaluation in [facility] ED she was noted to be afebrile tachycardic, and hypertensive.  Laboratory workup at that time revealed leukocytosis, elevated creatinine, elevated lactic acid, and elevated CRP.  Additionally it showed hyperglycemia with anion gap acidosis consistent with diabetic ketoacidosis.  CT imaging of the abdomen showed extensive soft tissue air throughout the right groin and inguinal region extending to the right lower quadrant of the anterior abdominal wall and medial aspect of the right thigh concerning for gas-forming infection.  She was admitted to hospital medicine service and taken to the OR by General surgery for debridement.  Empiric antibiotics with Zosyn, clindamycin, and vancomycin was started.  She underwent incision and drainage with debridement of the soft tissues down to the muscular fascia on 01/29.  She was then subsequently taken back to the OR on 01/31 where she underwent irrigation and debridement of the wound and insertion of a triple-lumen temporary hemodialysis catheter.  She was subsequently transitioned to meropenem and clindamycin while in [facility].  Unfortunately her hospital course was complicated by acute respiratory failure requiring intubation with mechanical ventilation and worsening ALVARADO prompting initiation of renal replacement therapy.  She was transferred to [facility] [place] higher level of care.  Upon transfer the patient's antibiotic therapy was deescalated to ceftriaxone.    Infectious disease is consulted for Antibiotic management: currently on merem, concern for nec fasciitis          Interval History: ".  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 Taken to OR on 2/2 for debridement of the right buttocks and groin (#3). Evaluated by Neurology on 2/3 for encephalopathy. CT head with remote infarcts. Not withdrawing to pain on the morning of 2/5.    MRI done on 2/6 with embolic infarcts. Pending wound VAC exchange.    Review of Systems   Unable to perform ROS: Intubated     Objective:     Vital Signs (Most Recent):  Temp: 97.4 °F (36.3 °C) (02/06/24 1215)  Pulse: 91 (02/06/24 1315)  Resp: (!) 23 (02/06/24 1315)  BP: (!) 197/94 (02/06/24 1215)  SpO2: 97 % (02/06/24 1315) Vital Signs (24h Range):  Temp:  [97.3 °F (36.3 °C)-100.3 °F (37.9 °C)] 97.4 °F (36.3 °C)  Pulse:  [] 91  Resp:  [15-30] 23  SpO2:  [95 %-100 %] 97 %  BP: (132-197)/(62-94) 197/94  Arterial Line BP: (123-205)/(56-91) 160/63     Weight: (!) 162 kg (357 lb 2.3 oz)  Body mass index is 57.67 kg/m².    Estimated Creatinine Clearance: 30.3 mL/min (A) (based on SCr of 3.4 mg/dL (H)).     Physical Exam  Vitals and nursing note reviewed.   Constitutional:       Appearance: She is well-developed. She is ill-appearing.      Interventions: She is intubated.   HENT:      Head: Normocephalic and atraumatic.      Right Ear: External ear normal.      Left Ear: External ear normal.   Eyes:      General: No scleral icterus.        Right eye: No discharge.         Left eye: No discharge.      Conjunctiva/sclera: Conjunctivae normal.   Cardiovascular:      Rate and Rhythm: Normal rate and regular rhythm.      Heart sounds: Normal heart sounds. No murmur heard.     No friction rub. No gallop.   Pulmonary:      Effort: Pulmonary effort is normal. No respiratory distress. She is intubated.      Breath sounds: No stridor. Rhonchi present. No wheezing or rales.   Abdominal:      General: Bowel sounds are normal.   Musculoskeletal:         General: No tenderness.   Skin:     General: Skin is warm and dry.      Comments: Wound VAC on right groin area   Neurological:      Mental Status: She is  lethargic.      Comments: Does not respond to verbal, tactile or noxious stimuli.           Significant Labs: BMP:   Recent Labs   Lab 02/06/24 0339   GLU 88      K 3.9      CO2 23   BUN 30*   CREATININE 3.4*   CALCIUM 8.1*   MG 1.8       CBC:   Recent Labs   Lab 02/05/24  0321 02/06/24 0339   WBC 29.32* 32.17*   HGB 8.3* 7.9*   HCT 24.6* 23.5*    246       Microbiology Results (last 7 days)       Procedure Component Value Units Date/Time    Culture, Anaerobe [[id number]]  (Abnormal) Collected: 01/30/24 0228    Order Status: Completed Specimen: Wound from Groin Updated: 02/06/24 1249     Anaerobic Culture BACTEROIDES SPECIES  Few      Narrative:      Right groin tissue    Culture, Anaerobe [[id number]] Collected: 01/30/24 0219    Order Status: Completed Specimen: Abscess from Groin Updated: 02/04/24 0840     Anaerobic Culture Culture in progress    Narrative:      Right groin abcess    Blood culture x two cultures. Draw prior to antibiotics. [[id number]] Collected: 01/29/24 2118    Order Status: Completed Specimen: Blood from Peripheral, Antecubital, Left Updated: 02/02/24 2303     Blood Culture, Routine No Growth after 4 days.    Narrative:      Aerobic and anaerobic    Blood culture x two cultures. Draw prior to antibiotics. [[id number]] Collected: 01/29/24 1929    Order Status: Completed Specimen: Blood from Peripheral, Antecubital, Right Updated: 02/02/24 2103     Blood Culture, Routine No Growth after 4 days.    Narrative:      Aerobic and anaerobic    Aerobic culture [[id number]]  (Abnormal)  (Susceptibility) Collected: 01/30/24 0228    Order Status: Completed Specimen: Wound from Groin Updated: 02/02/24 0746     Aerobic Bacterial Culture PROTEUS MIRABILIS  Moderate      Narrative:      Right groin tissue    Aerobic culture [[id number]]  (Abnormal)  (Susceptibility) Collected: 01/30/24 0219    Order Status: Completed Specimen: Abscess from Groin Updated: 02/02/24 0746     Aerobic Bacterial  Culture PROTEUS MIRABILIS  Moderate      Narrative:      Right groin abcess    AFB Culture & Smear [[id number]] Collected: 01/30/24 0219    Order Status: Completed Specimen: Abscess from Groin Updated: 01/31/24 2127     AFB Culture & Smear Culture in progress     AFB CULTURE STAIN No acid fast bacilli seen.    Narrative:      Right groin abcess    AFB Culture & Smear [[id number]] Collected: 01/30/24 0228    Order Status: Completed Specimen: Wound from Groin Updated: 01/31/24 2127     AFB Culture & Smear Culture in progress     AFB CULTURE STAIN No acid fast bacilli seen.    Narrative:      Right groin tissue            Significant Imaging: I have reviewed all pertinent imaging results/findings within the past 24 hours.

## 2024-02-06 NOTE — SUBJECTIVE & OBJECTIVE
Interval History/Significant Events: NAEON. Went for debridement & WV replacement yesterday. Tolerated HD overnight.     Follow-up For: Procedure(s) (LRB):  DEBRIDEMENT, WOUND (Bilateral)    Post-Operative Day: 4 Days Post-Op    Objective:     Vital Signs (Most Recent):  Temp: 99.4 °F (37.4 °C) (02/06/24 0400)  Pulse: 94 (02/06/24 0700)  Resp: (!) 23 (02/06/24 0700)  BP: 132/62 (02/06/24 0700)  SpO2: 100 % (02/06/24 0700) Vital Signs (24h Range):  Temp:  [98.3 °F (36.8 °C)-100.3 °F (37.9 °C)] 99.4 °F (37.4 °C)  Pulse:  [] 94  Resp:  [14-28] 23  SpO2:  [98 %-100 %] 100 %  BP: (132-174)/(62-94) 132/62  Arterial Line BP: (130-179)/(59-88) 130/59     Weight: (!) 162 kg (357 lb 2.3 oz)  Body mass index is 57.67 kg/m².      Intake/Output Summary (Last 24 hours) at 2/6/2024 0741  Last data filed at 2/6/2024 0701  Gross per 24 hour   Intake 3002.07 ml   Output 2788 ml   Net 214.07 ml       Physical Exam  Vitals reviewed.   Constitutional:       Appearance: She is obese.   HENT:      Head: Normocephalic.      Right Ear: Tympanic membrane normal.      Nose: Nose normal.      Mouth/Throat:      Mouth: Mucous membranes are moist.   Eyes:      Pupils: Brisk Corneal and Pupillary Reflexes   Cardiovascular:      Rate and Rhythm: Normal rate and regular rhythm.   Pulmonary:      Effort: Pulmonary effort is normal.      Breath sounds: Normal breath sounds.      Intubated  Abdominal:      General: Abdomen is flat.      Palpations: Abdomen is soft.   Genitourinary:     General: Normal vulva.   Musculoskeletal:         General: Normal range of motion.   Skin:     General: Skin is warm.      Capillary Refill: Capillary refill takes less than 2 seconds.   Neurological:      Mental Status: She is alert.      Comments: On the vent; withdraws to pain but eyes not opening to pain  Psychiatric:         Mood and Affect: Mood normal       Vents:  Vent Mode: Spont (02/07/24 0355)  Set Rate: 18 BPM (02/06/24 0349)  Vt Set: 420 mL (02/06/24  0349)  Pressure Support: 10 cmH20 (02/07/24 0355)  PEEP/CPAP: 5 cmH20 (02/07/24 0355)  Oxygen Concentration (%): 40 (02/07/24 0723)  Peak Airway Pressure: 15 cmH20 (02/07/24 0355)  Plateau Pressure: 15 cmH20 (02/07/24 0355)  Total Ve: 4.43 L/m (02/07/24 0355)  Negative Inspiratory Force (cm H2O): 0 (02/07/24 0355)  F/VT Ratio<105 (RSBI): (!) 57.49 (02/07/24 0355)    Lines/Drains/Airways       Central Venous Catheter Line  Duration             Trialysis (Dialysis) Catheter 01/31/24 1051 right internal jugular 5 days              Drain  Duration                  NG/OG Tube 02/01/24 2250 18 Fr. Center mouth 4 days         Rectal Tube 02/04/24 1545 1 day              Airway  Duration                  Airway - Non-Surgical 01/31/24 1020 5 days              Arterial Line  Duration             Arterial Line 01/31/24 1025 Right Radial 5 days              Peripheral Intravenous Line  Duration                  Peripheral IV - Single Lumen 02/04/24 2241 20 G Right Antecubital 1 day                    Significant Labs:    CBC/Anemia Profile:  Recent Labs   Lab 02/05/24 0321 02/06/24  0339   WBC 29.32* 32.17*   HGB 8.3* 7.9*   HCT 24.6* 23.5*    246   MCV 77* 79*   RDW 15.1* 15.8*        Chemistries:  Recent Labs   Lab 02/05/24 0321 02/05/24  1348 02/05/24 2259 02/06/24  0339     135* 136 137 136   K 3.9  3.8 3.8 4.1 3.9     105 106 106 105   CO2 19*  20* 19* 21* 23   BUN 52*  52* 63* 35* 30*   CREATININE 5.2*  5.2* 5.8* 3.5* 3.4*   CALCIUM 8.2*  8.1* 8.4* 8.1* 8.1*   ALBUMIN 1.6*  1.6* 1.6* 1.7* 1.6*   PROT 6.8  --   --   --    BILITOT 0.3  --   --   --    ALKPHOS 150*  --   --   --    ALT 15  --   --   --    AST 30  --   --   --    MG 2.1 2.2 1.9 1.8   PHOS 4.3  4.3 4.8* 2.8 3.1  3.1       All pertinent labs within the past 24 hours have been reviewed.    Significant Imaging:  I have reviewed all pertinent imaging results/findings within the past 24 hours.

## 2024-02-06 NOTE — PROGRESS NOTES
02/05/24 1843   Treatment   Treatment Type SLED   Treatment Status New start   Dialysis Machine Number K54   Dialyzer Time (hours) 0   BVP (Liters) 0 L   Solutions Labeled and Current  Yes   Access Temporary Cath;Right;IJ   Catheter Dressing Intact  Yes   Alarms Engaged Yes   CRRT Comments sled started as ordered   Prescription   Time (Hours) 10   Dialysate K + (mEq/L) 4   Dialysate CA + (mEq/L) 2.25   Dialysate HCO3 - (Bicarb) (mEq/L) 30   Dialysate Na + (mEq/L) 140   Cartridge Type Other  (r300)   Dialysate Flow Rate (mL/min) 200   UF Goal Rate 400 mL/hr   CRRT Hourly Documentation   Blood Flow (mL/min) 200   UF Rate 350 cc/hr   Arterial Pressure (mmHg) -70 mmHg   Venous Pressure (mmHg) 50 mmHg   Effluent Pressure (EP) (mmHg) 40 mmHg   Total UF (Hourly Cleared) (mL) 0     SLED started with citrate and calcium as ordered. RIJ CVC aspirated, flushed, and accessed using aseptic technique. Lines connected and secured.

## 2024-02-06 NOTE — PROGRESS NOTES
02/06/24 0145 02/06/24 0211   Treatment   Treatment Type SLED  --    Treatment Status Restart Blood returned   Dialysis Machine Number K54  --    Dialyzer Time (hours) 0 0.26   BVP (Liters) 0 L 4.3 L   Solutions Labeled and Current  Yes  --    Access Temporary Cath;Right;IJ  --    Catheter Dressing Intact  Yes  --    Alarms Engaged Yes  --    CRRT Comments SLED restarted; lines reversed; venous lumen with  better blood return than arterial lumen Frequent arterial alarms; Pt rinsed back by ICU RN

## 2024-02-06 NOTE — SUBJECTIVE & OBJECTIVE
Interval History: ".  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 Taken to OR on 2/2 for debridement of the right buttocks and groin (#3). Evaluated by Neurology on 2/3 for encephalopathy. CT head with remote infarcts. Not withdrawing to pain on the morning of 2/5.    MRI done on 2/6 with embolic infarcts. Pending wound VAC exchange.    Review of Systems   Unable to perform ROS: Intubated     Objective:     Vital Signs (Most Recent):  Temp: 97.4 °F (36.3 °C) (02/06/24 1215)  Pulse: 91 (02/06/24 1315)  Resp: (!) 23 (02/06/24 1315)  BP: (!) 197/94 (02/06/24 1215)  SpO2: 97 % (02/06/24 1315) Vital Signs (24h Range):  Temp:  [97.3 °F (36.3 °C)-100.3 °F (37.9 °C)] 97.4 °F (36.3 °C)  Pulse:  [] 91  Resp:  [15-30] 23  SpO2:  [95 %-100 %] 97 %  BP: (132-197)/(62-94) 197/94  Arterial Line BP: (123-205)/(56-91) 160/63     Weight: (!) 162 kg (357 lb 2.3 oz)  Body mass index is 57.67 kg/m².    Estimated Creatinine Clearance: 30.3 mL/min (A) (based on SCr of 3.4 mg/dL (H)).     Physical Exam  Vitals and nursing note reviewed.   Constitutional:       Appearance: She is well-developed. She is ill-appearing.      Interventions: She is intubated.   HENT:      Head: Normocephalic and atraumatic.      Right Ear: External ear normal.      Left Ear: External ear normal.   Eyes:      General: No scleral icterus.        Right eye: No discharge.         Left eye: No discharge.      Conjunctiva/sclera: Conjunctivae normal.   Cardiovascular:      Rate and Rhythm: Normal rate and regular rhythm.      Heart sounds: Normal heart sounds. No murmur heard.     No friction rub. No gallop.   Pulmonary:      Effort: Pulmonary effort is normal. No respiratory distress. She is intubated.      Breath sounds: No stridor. Rhonchi present. No wheezing or rales.   Abdominal:      General: Bowel sounds are normal.   Musculoskeletal:         General: No tenderness.   Skin:     General: Skin is warm  and dry.      Comments: Wound VAC on right groin area   Neurological:      Mental Status: She is lethargic.      Comments: Does not respond to verbal, tactile or noxious stimuli.           Significant Labs: BMP:   Recent Labs   Lab 02/06/24 0339   GLU 88      K 3.9      CO2 23   BUN 30*   CREATININE 3.4*   CALCIUM 8.1*   MG 1.8       CBC:   Recent Labs   Lab 02/05/24  0321 02/06/24  0339   WBC 29.32* 32.17*   HGB 8.3* 7.9*   HCT 24.6* 23.5*    246       Microbiology Results (last 7 days)       Procedure Component Value Units Date/Time    Culture, Anaerobe [[id number]]  (Abnormal) Collected: 01/30/24 0228    Order Status: Completed Specimen: Wound from Groin Updated: 02/06/24 1249     Anaerobic Culture BACTEROIDES SPECIES  Few      Narrative:      Right groin tissue    Culture, Anaerobe [[id number]] Collected: 01/30/24 0219    Order Status: Completed Specimen: Abscess from Groin Updated: 02/04/24 0840     Anaerobic Culture Culture in progress    Narrative:      Right groin abcess    Blood culture x two cultures. Draw prior to antibiotics. [[id number]] Collected: 01/29/24 2118    Order Status: Completed Specimen: Blood from Peripheral, Antecubital, Left Updated: 02/02/24 2303     Blood Culture, Routine No Growth after 4 days.    Narrative:      Aerobic and anaerobic    Blood culture x two cultures. Draw prior to antibiotics. [[id number]] Collected: 01/29/24 1929    Order Status: Completed Specimen: Blood from Peripheral, Antecubital, Right Updated: 02/02/24 2103     Blood Culture, Routine No Growth after 4 days.    Narrative:      Aerobic and anaerobic    Aerobic culture [[id number]]  (Abnormal)  (Susceptibility) Collected: 01/30/24 0228    Order Status: Completed Specimen: Wound from Groin Updated: 02/02/24 0746     Aerobic Bacterial Culture PROTEUS MIRABILIS  Moderate      Narrative:      Right groin tissue    Aerobic culture [[id number]]  (Abnormal)  (Susceptibility) Collected: 01/30/24 0219     Order Status: Completed Specimen: Abscess from Groin Updated: 02/02/24 0746     Aerobic Bacterial Culture PROTEUS MIRABILIS  Moderate      Narrative:      Right groin abcess    AFB Culture & Smear [[id number]] Collected: 01/30/24 0219    Order Status: Completed Specimen: Abscess from Groin Updated: 01/31/24 2127     AFB Culture & Smear Culture in progress     AFB CULTURE STAIN No acid fast bacilli seen.    Narrative:      Right groin abcess    AFB Culture & Smear [[id number]] Collected: 01/30/24 0228    Order Status: Completed Specimen: Wound from Groin Updated: 01/31/24 2127     AFB Culture & Smear Culture in progress     AFB CULTURE STAIN No acid fast bacilli seen.    Narrative:      Right groin tissue            Significant Imaging: I have reviewed all pertinent imaging results/findings within the past 24 hours.

## 2024-02-06 NOTE — ASSESSMENT & PLAN NOTE
Lab Results   Component Value Date    CREATININE 3.4 (H) 02/06/2024     Avoid insulin stacking  Titrate insulin slowly

## 2024-02-06 NOTE — PLAN OF CARE
Sarah Saravia is a 53 y.o. female being followed by the general neurology service for acute encephalopathy.   MRI Brain completed today shows multiple infarcts concerning for embolic phenomenon.     At this time we recommend consulting vascular neurology.  There are no further recommendations at this time. General Neurology will sign off. Please contact us with any questions.    Kristopher Espinoza MD; PGY-2  General Neurology Consult Service

## 2024-02-06 NOTE — ASSESSMENT & PLAN NOTE
Not responsive to verbal, tactile or noxious stimuli during my examination. MRI with multiple acute infarcts concerning for embolic phenomena. No evidence of bacteremia.   Agree with TTE.  Further management per primary.

## 2024-02-06 NOTE — EICU
Intervention Initiated From:  Bedside    Teofilo intervened regarding:  Time-Out    Nurse Notified:  Yes    Doctor Notified:  Yes    Comments: timeout done for central line/trialysis

## 2024-02-06 NOTE — ASSESSMENT & PLAN NOTE
Neuro/Psych:   #Acute Encephalopathy  CTH 2/3 with scattered hypoattenuation likely representing age indeterminate infarcts. EEG findings consistent with moderate-severe encephalopathy.  -- Sedation: None  -- Pain: kermit tylenol, dialudid and oxy prn  -- GCS 7T: E2, V1T, M4; exam stable  -- Neurology following; appreciate recs  -- Pending MRI             Cards:   -- HDS  -- goal SBP < 180, MAP >65  -- hypertensive, hydralazine and labetalol prns  -- Continue amlodipine 10mg      Pulm:   #Acute Respiratory Failure  -- Intubated  -- Goal O2 sat > 90%  -- Daily SBT  -- CXR pending      Renal:  #ALVARADO on CKD  Received CRRT 2/5. ATN.   -- Nephrology following; appreciate recs  -- RRT as needed per nephrology  -- Stool output 70  -- Urine output: <20cc  Recent Labs   Lab 02/05/24  1348 02/05/24  2259 02/06/24  0339   BUN 63* 35* 30*   CREATININE 5.8* 3.5* 3.4*        FEN / GI:   -- Daily CMPs  -- NGT in place   -- Replace lytes as needed  -- Nutrition: NPO, TF (Novasource Renal) at goal 30 ml/hr - Held at MN  -- GI PPX   -- Nutrition following; appreciate recs  -- Continue metamucil       ID:    #Ayaz's gangrene  s/p OR debridement for nec fascitis. R groin tissue with proteus, sensitive to ceftriaxone  -- Abx: ceftriaxone and flagyl  -- ID following ; appreciate recs  -- Debridement and wound vac change 2/6  Recent Labs   Lab 02/04/24 0317 02/05/24 0321 02/06/24  0339   WBC 25.10* 29.32* 32.17*        Heme/Onc:  -- Daily CBC  -- Heparin DVT ppx  Recent Labs   Lab 02/01/24  2246 02/02/24  0335 02/04/24 0317 02/05/24  0321 02/06/24  0339   HGB  --    < > 7.9* 8.3* 7.9*   PLT  --    < > 205 227 246   APTT 27.8  --   --   --   --    INR 1.0  --   --   --   --     < > = values in this interval not displayed.        Endo:   #IDDM  Initially presented to OSH with DKA with latest A1C 10.4 AG Gap resolved  - Increasing insulin prn requirements since starting tube feeds  - Placed on insulin gtt 2/3: Transition IV insulin  infusion at 1.8 u/hr with stepdown parameters (0.5 u/kg dosing)   - Increased Novolog to 10 units units q 4 hrs while on Tube feeds (HOLD if tube feeds are stopped or BG < 100) - 97 today  - Moderate dose SSI  - Endocrine following, appreciate recs        PPx:   Feeding: NPO, TF @ 30  Analgesia/Sedation: See above  Thromboembolic prevention: SQH   HOB >30: Y  Stress Ulcer ppx: Famotidine  Glucose control: Critical care goal 140-180 g/dl, Insulin gtt, kermit novolog, SSI, Endo following  Bowel reg: N/A  Invasive Lines/Drains/Airway: Glynn, ETT, Art line, Trialysis, PIV x2, Rectal tube      Dispo/Code Status/Palliative:   -- SICU / Full Code

## 2024-02-06 NOTE — PLAN OF CARE
"Please link this note to the resident's progress note. This note serves as the attestation.    In brief, Sarah Saravia is a 53 year-old woman with a history of hypertension, morbid obesity, renal insufficiency, and type 2 diabetes who was admitted as a transfer for necrotizing soft tissue infection.  She has undergone debridements for treatment.  She was also in acute renal failure and has multifactorial encephalopathy currently being worked up.    Critical Care issues in this patient include:    Ayaz's gangrene              * Going to the OR today for repeat debridement/wound vac exchange. Continue rocephin and flagyl. ID consulted and are following. Appreciate their recommendations. Would cultures growing pansensitive proteus mirabilis. Blood cultures have been NGTD.     Encephalopathy, metabolic              * Likely due to sepsis and severe infection. Work up ongoing. Neurology consulted and are following. EEG done and shows severe slowing and no seizures. MRI brain done today revealed "several scattered punctate acute infarcts throughout the bilateral frontal lobes, centrum semiovale, basal ganglia, corpus callosum, and cerebellar hemispheres" concerning for embolic source. Obtain echo to evaluate for cardiac vegetations or PFO. Consult Vascular Neurology per Neurology (general).     Acute renal failure with tubular necrosis              * ARF on CKD. Glynn removed yesterday. Bladder scans to monitor UOP. Nephrology has been consulted and are following. Appreciate recs. Received SLED yesterday. Monitor electrolytes.     Type 2 diabetes mellitus with hyperglycemia              * Endocrinology consulted and are following. Appreciate their recs. Poorly controlled BG at baseline with recent Hgb A1c at 10.4%. Continue with hourly glucose checks and titrate insulin infusion per protocol. Holding this morning since she has been NPO for the OR.     Encounter for weaning the ventilator              * Remains on " pressure support. Cannot extubate secondary to poor mental status. CXR and ABG pending this morning.    Patient remains stable. Mental status has not improved. MRI brain shows multiple infarcts--work up ongoing for source. Will restart tube feeds after surgery. Fiber added to TFs given diarrhea. Continue GI and DVT prophylaxis. Continue ICU care.    Critical care time spent: 38 min    Critical care was time spent personally by me on the following activities: development of treatment plan with patient or surrogate and bedside caregivers, discussions with consultants, evaluation of patient's response to treatment, examination of patient, ordering and performing treatments and interventions, ordering and review of laboratory studies, ordering and review of radiographic studies, pulse oximetry, re-evaluation of patient's condition.  This critical care time did not overlap with that of any other provider or involve time for any procedures.      Karla Connelly MD, FACS  General Surgery and Surgical Critical Care  [facility]

## 2024-02-06 NOTE — PLAN OF CARE
Problem: Adult Inpatient Plan of Care  Goal: Plan of Care Review  Outcome: Ongoing, Progressing  Goal: Patient-Specific Goal (Individualized)  Outcome: Ongoing, Progressing  Goal: Absence of Hospital-Acquired Illness or Injury  Outcome: Ongoing, Progressing  Goal: Optimal Comfort and Wellbeing  Outcome: Ongoing, Progressing  Goal: Readiness for Transition of Care  Outcome: Ongoing, Progressing     Problem: Bariatric Environmental Safety  Goal: Safety Maintained with Care  Outcome: Ongoing, Progressing     Problem: Diabetes Comorbidity  Goal: Blood Glucose Level Within Targeted Range  Outcome: Ongoing, Progressing     Problem: Adjustment to Illness (Sepsis/Septic Shock)  Goal: Optimal Coping  Outcome: Ongoing, Progressing     Problem: Bleeding (Sepsis/Septic Shock)  Goal: Absence of Bleeding  Outcome: Ongoing, Progressing     Problem: Glycemic Control Impaired (Sepsis/Septic Shock)  Goal: Blood Glucose Level Within Desired Range  Outcome: Ongoing, Progressing     Problem: Infection Progression (Sepsis/Septic Shock)  Goal: Absence of Infection Signs and Symptoms  Outcome: Ongoing, Progressing     Problem: Nutrition Impaired (Sepsis/Septic Shock)  Goal: Optimal Nutrition Intake  Outcome: Ongoing, Progressing     Problem: Diabetic Ketoacidosis  Goal: Fluid and Electrolyte Balance with Absence of Ketosis  Outcome: Ongoing, Progressing     Problem: Fluid and Electrolyte Imbalance (Acute Kidney Injury/Impairment)  Goal: Fluid and Electrolyte Balance  Outcome: Ongoing, Progressing     Problem: Oral Intake Inadequate (Acute Kidney Injury/Impairment)  Goal: Optimal Nutrition Intake  Outcome: Ongoing, Progressing     Problem: Renal Function Impairment (Acute Kidney Injury/Impairment)  Goal: Effective Renal Function  Outcome: Ongoing, Progressing     Problem: Infection  Goal: Absence of Infection Signs and Symptoms  Outcome: Ongoing, Progressing     Problem: Device-Related Complication Risk (Hemodialysis)  Goal: Safe,  Effective Therapy Delivery  Outcome: Ongoing, Progressing     Problem: Hemodynamic Instability (Hemodialysis)  Goal: Effective Tissue Perfusion  Outcome: Ongoing, Progressing     Problem: Infection (Hemodialysis)  Goal: Absence of Infection Signs and Symptoms  Outcome: Ongoing, Progressing     Problem: Fall Injury Risk  Goal: Absence of Fall and Fall-Related Injury  Outcome: Ongoing, Progressing     Problem: Restraint, Nonbehavioral (Nonviolent)  Goal: Absence of Harm or Injury  Outcome: Ongoing, Progressing     Problem: Device-Related Complication Risk (CRRT (Continuous Renal Replacement Therapy))  Goal: Safe, Effective Therapy Delivery  Outcome: Ongoing, Progressing     Problem: Hypothermia (CRRT (Continuous Renal Replacement Therapy))  Goal: Body Temperature Maintained in Desired Range  Outcome: Ongoing, Progressing     Problem: Infection (CRRT (Continuous Renal Replacement Therapy))  Goal: Absence of Infection Signs and Symptoms  Outcome: Ongoing, Progressing     Problem: Communication Impairment (Mechanical Ventilation, Invasive)  Goal: Effective Communication  Outcome: Ongoing, Progressing     Problem: Device-Related Complication Risk (Mechanical Ventilation, Invasive)  Goal: Optimal Device Function  Outcome: Ongoing, Progressing     Problem: Inability to Wean (Mechanical Ventilation, Invasive)  Goal: Mechanical Ventilation Liberation  Outcome: Ongoing, Progressing     Problem: Nutrition Impairment (Mechanical Ventilation, Invasive)  Goal: Optimal Nutrition Delivery  Outcome: Ongoing, Progressing     Problem: Skin and Tissue Injury (Mechanical Ventilation, Invasive)  Goal: Absence of Device-Related Skin and Tissue Injury  Outcome: Ongoing, Progressing     Problem: Ventilator-Induced Lung Injury (Mechanical Ventilation, Invasive)  Goal: Absence of Ventilator-Induced Lung Injury  Outcome: Ongoing, Progressing

## 2024-02-06 NOTE — PROGRESS NOTES
02/06/24 0130 02/06/24 0145   Treatment   Treatment Type SLED SLED   Treatment Status Blood returned Restart   Dialysis Machine Number  --  [id number]   Dialyzer Time (hours) 6.33 0   BVP (Liters) 72.4 L 0 L   Solutions Labeled and Current   --  Yes   Access  --  Temporary Cath;Right;IJ   Catheter Dressing Intact   --  Yes   Alarms Engaged  --  Yes   CRRT Comments Arterial pressure alarms; lines already reversed throughout tx; both lumens with sluggish blood return SLED restarted; lines reversed; venous lumen with  better blood return than arterial lumen

## 2024-02-06 NOTE — PROGRESS NOTES
[facility]ensive [facility]  Critical Care - Surgery  Progress Note    Patient Name: Sarah Saravia  MRN: [id number]  Admission Date: 1/29/2024  Hospital Length of Stay: 8 days  Code Status: Full Code  Attending Provider: Steve Cole MD  [facility] Care Provider: [facility]   Principal Problem: Ayaz's gangrene    Subjective:     Hospital/ICU Course:  No notes on file    Interval History/Significant Events: NAEON. HDS. Underwent CRRT yesterday. To OR today for wound vac change.    Follow-up For: Procedure(s) (LRB):  DEBRIDEMENT, WOUND (Bilateral)    Post-Operative Day: 4 Days Post-Op    Objective:     Vital Signs (Most Recent):  Temp: 99.6 °F (37.6 °C) (02/06/24 0000)  Pulse: 93 (02/06/24 0538)  Resp: (!) 27 (02/06/24 0445)  BP: (!) 164/74 (02/06/24 0400)  SpO2: 98 % (02/06/24 0445) Vital Signs (24h Range):  Temp:  [98.2 °F (36.8 °C)-100.3 °F (37.9 °C)] 99.6 °F (37.6 °C)  Pulse:  [] 93  Resp:  [14-28] 27  SpO2:  [98 %-100 %] 98 %  BP: (137-174)/(65-94) 164/74  Arterial Line BP: (130-179)/(62-88) 167/69     Weight: (!) 162 kg (357 lb 2.3 oz)  Body mass index is 57.67 kg/m².      Intake/Output Summary (Last 24 hours) at 2/6/2024 0628  Last data filed at 2/6/2024 0530  Gross per 24 hour   Intake 3034.87 ml   Output 2828 ml   Net 206.87 ml          Physical Exam  Vitals reviewed.   Constitutional:       Appearance: She is obese.   HENT:      Head: Normocephalic.      Right Ear: Tympanic membrane normal.      Nose: Nose normal.      Mouth/Throat:      Mouth: Mucous membranes are moist.   Eyes:      Pupils: Brisk Corneal and Pupillary Reflexes   Cardiovascular:      Rate and Rhythm: Normal rate and regular rhythm.   Pulmonary:      Effort: Pulmonary effort is normal.      Breath sounds: Normal breath sounds.      Intubated  Abdominal:      General: Abdomen is flat.      Palpations: Abdomen is soft.   Genitourinary:     General: Normal vulva.   Musculoskeletal:         General:  Normal range of motion.   Skin:     General: Skin is warm.      Capillary Refill: Capillary refill takes less than 2 seconds.   Neurological:      Mental Status: She is alert.      Comments: On the vent; withdraws to pain but eyes not opening to pain  Psychiatric:         Mood and Affect: Mood normal       Vents:  Vent Mode: A/C (02/06/24 0349)  Set Rate: 18 BPM (02/06/24 0349)  Vt Set: 420 mL (02/06/24 0349)  Pressure Support: 10 cmH20 (02/05/24 2331)  PEEP/CPAP: 5 cmH20 (02/06/24 0349)  Oxygen Concentration (%): 40 (02/06/24 0349)  Peak Airway Pressure: 8.8 cmH20 (02/06/24 0349)  Plateau Pressure: 15 cmH20 (02/06/24 0349)  Total Ve: 11.4 L/m (02/06/24 0349)  Negative Inspiratory Force (cm H2O): 0 (02/06/24 0349)  F/VT Ratio<105 (RSBI): (!) 62.5 (02/06/24 0349)    Lines/Drains/Airways       Central Venous Catheter Line  Duration             Trialysis (Dialysis) Catheter 01/31/24 1051 right internal jugular 5 days              Drain  Duration                  NG/OG Tube 02/01/24 2250 18 Fr. Center mouth 4 days         Rectal Tube 02/04/24 1545 1 day              Airway  Duration                  Airway - Non-Surgical 01/31/24 1020 5 days              Arterial Line  Duration             Arterial Line 01/31/24 1025 Right Radial 5 days              Peripheral Intravenous Line  Duration                  Peripheral IV - Single Lumen 02/04/24 2241 20 G Right Antecubital 1 day                    Significant Labs:    CBC/Anemia Profile:  Recent Labs   Lab 02/05/24 0321 02/06/24 0339   WBC 29.32* 32.17*   HGB 8.3* 7.9*   HCT 24.6* 23.5*    246   MCV 77* 79*   RDW 15.1* 15.8*        Chemistries:  Recent Labs   Lab 02/05/24  0321 02/05/24  1348 02/05/24  2259 02/06/24 0339     135* 136 137 136   K 3.9  3.8 3.8 4.1 3.9     105 106 106 105   CO2 19*  20* 19* 21* 23   BUN 52*  52* 63* 35* 30*   CREATININE 5.2*  5.2* 5.8* 3.5* 3.4*   CALCIUM 8.2*  8.1* 8.4* 8.1* 8.1*   ALBUMIN 1.6*  1.6* 1.6* 1.7*  1.6*   PROT 6.8  --   --   --    BILITOT 0.3  --   --   --    ALKPHOS 150*  --   --   --    ALT 15  --   --   --    AST 30  --   --   --    MG 2.1 2.2 1.9 1.8   PHOS 4.3  4.3 4.8* 2.8 3.1  3.1       None    Significant Imaging:  I have reviewed all pertinent imaging results/findings within the past 24 hours.  Assessment/Plan:     Renal/  * Ayaz's gangrene    Neuro/Psych:   #Acute Encephalopathy  CTH 2/3 with scattered hypoattenuation likely representing age indeterminate infarcts. EEG findings consistent with moderate-severe encephalopathy.  -- Sedation: None  -- Pain: kermit tylenol, dialudid and oxy prn  -- GCS 7T: E2, V1T, M4; exam stable  -- Neurology following; appreciate recs  -- Pending MRI             Cards:   -- HDS  -- goal SBP < 180, MAP >65  -- hypertensive, hydralazine and labetalol prns  -- Continue amlodipine 10mg      Pulm:   #Acute Respiratory Failure  -- Intubated  -- Goal O2 sat > 90%  -- Daily SBT  -- CXR pending      Renal:  #ALVARADO on CKD  Received CRRT 2/5. ATN.   -- Nephrology following; appreciate recs  -- RRT as needed per nephrology  -- Stool output 70  -- Urine output: <20cc  Recent Labs   Lab 02/05/24  1348 02/05/24  2259 02/06/24  0339   BUN 63* 35* 30*   CREATININE 5.8* 3.5* 3.4*        FEN / GI:   -- Daily CMPs  -- NGT in place   -- Replace lytes as needed  -- Nutrition: NPO, TF (Novasource Renal) at goal 30 ml/hr - Held at MN  -- GI PPX   -- Nutrition following; appreciate recs  -- Continue metamucil       ID:    #Ayaz's gangrene  s/p OR debridement for nec fascitis. R groin tissue with proteus, sensitive to ceftriaxone  -- Abx: ceftriaxone and flagyl  -- ID following ; appreciate recs  -- Debridement and wound vac change 2/6  Recent Labs   Lab 02/04/24  0317 02/05/24  0321 02/06/24  0339   WBC 25.10* 29.32* 32.17*        Heme/Onc:  -- Daily CBC  -- Heparin DVT ppx  Recent Labs   Lab 02/01/24 2246 02/02/24 0335 02/04/24 0317 02/05/24 0321 02/06/24 0339   HGB  --    < >  7.9* 8.3* 7.9*   PLT  --    < > 205 227 246   APTT 27.8  --   --   --   --    INR 1.0  --   --   --   --     < > = values in this interval not displayed.        Endo:   #IDDM  Initially presented to OSH with DKA with latest A1C 10.4 AG Gap resolved  - Increasing insulin prn requirements since starting tube feeds  - Placed on insulin gtt 2/3: Transition IV insulin infusion at 1.8 u/hr with stepdown parameters (0.5 u/kg dosing)   - Increased Novolog to 10 units units q 4 hrs while on Tube feeds (HOLD if tube feeds are stopped or BG < 100) - 97 today  - Moderate dose SSI  - Endocrine following, appreciate recs        PPx:   Feeding: NPO, TF @ 30  Analgesia/Sedation: See above  Thromboembolic prevention: SQH   HOB >30: Y  Stress Ulcer ppx: Famotidine  Glucose control: Critical care goal 140-180 g/dl, Insulin gtt, kermit novolog, SSI, Endo following  Bowel reg: N/A  Invasive Lines/Drains/Airway: Glynn, ETT, Art line, Trialysis, PIV x2, Rectal tube      Dispo/Code Status/Palliative:   -- SICU / Full Code       Chirag Borges MD  Critical Care - Surgery  [facility]

## 2024-02-06 NOTE — PROGRESS NOTES
"[facility] - Surgical Intensive Care  Endocrinology  Progress Note    Admit Date: 1/29/2024     Reason for Consult: Management of T2DM, Hyperglycemia     Surgical Procedure and Date: n/a    Diabetes diagnosis year: new onset     Home Diabetes Medications:  none per chart review and family present at bedside     Lab Results   Component Value Date    HGBA1C 10.4 (H) 01/30/2024       Diabetes Complications include:     Hyperglycemia, DKA at OSH     Complicating diabetes co morbidities:   Obesity       HPI:   Patient is a 53 y.o. female with a diagnosis of obesity (BMI 53) admitted with N/V and R groin wound found to be in DKA at OSH. 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 While at OSH pt developed acute respiratory failure requiring intubation. Pulmonology was consulted for ventilator management and critical illness. Nephrology was consulted for worsening vishal in the setting of lactic acidosis and septic shock likely ATN, sCr elevated at 3.8 on admit now 4.0 post HD. Pt being admitted to SICU for surgical critical care management. Immediate plans include hemodynamic stabilization, weaning of respiratory support, and RRT.  Endocrinology consulted for management of T2DM.                 Interval HPI:   Overnight events: Remains intubated in SICU. POD 4. OR today for wound debridement.  BG trending down overnight on current IV/SQ insulin regimen. Creatinine 3.4.   Eating:   NPO  Nausea: No  Hypoglycemia and intervention: No  Fever: No  TPN and/or TF: Yes  If yes, type of TF/TPN and rate: TFs at 30 ml/hr (off since midnight)     /62 (BP Location: Left arm, Patient Position: Lying)   Pulse 97   Temp 98.8 °F (37.1 °C) (Oral)   Resp 20   Ht 5' 5.98" (1.676 m)   Wt (!) 162 kg (357 lb 2.3 oz)   LMP 01/01/2024 (Approximate) Comment: tubal ligation  SpO2 100%   BMI 57.67 kg/m²     Labs Reviewed and Include    Recent " "Labs   Lab 02/06/24  0339   GLU 88   CALCIUM 8.1*   ALBUMIN 1.6*      K 3.9   CO2 23      BUN 30*   CREATININE 3.4*     Lab Results   Component Value Date    WBC 32.17 (H) 02/06/2024    HGB 7.9 (L) 02/06/2024    HCT 23.5 (L) 02/06/2024    MCV 79 (L) 02/06/2024     02/06/2024     No results for input(s): "TSH", "FREET4" in the last 168 hours.  Lab Results   Component Value Date    HGBA1C 10.4 (H) 01/30/2024       Nutritional status:   Body mass index is 57.67 kg/m².  Lab Results   Component Value Date    ALBUMIN 1.6 (L) 02/06/2024    ALBUMIN 1.7 (L) 02/05/2024    ALBUMIN 1.6 (L) 02/05/2024     No results found for: "PREALBUMIN"    Estimated Creatinine Clearance: 30.3 mL/min (A) (based on SCr of 3.4 mg/dL (H)).    Accu-Checks  Recent Labs     02/05/24  0759 02/05/24  1126 02/05/24  1636 02/05/24  1638 02/05/24  2026 02/05/24  2335 02/06/24  0032 02/06/24  0411 02/06/24  0527 02/06/24  0736   POCTGLUCOSE 251* 205* 131* 144* 153* 138* 136* 97 112* 145*       Current Medications and/or Treatments Impacting Glycemic Control  Immunotherapy:    Immunosuppressants       None          Steroids:   Hormones (From admission, onward)      Start     Stop Route Frequency Ordered    01/30/24 0114  melatonin tablet 6 mg         -- Oral Nightly PRN 01/30/24 0016          Pressors:    Autonomic Drugs (From admission, onward)      None          Hyperglycemia/Diabetes Medications:   Antihyperglycemics (From admission, onward)      Start     Stop Route Frequency Ordered    02/05/24 1200  insulin aspart U-100 pen 14 Units         -- SubQ Every 4 hours 02/05/24 0822    02/03/24 1915  insulin regular in 0.9 % NaCl 100 unit/100 mL (1 unit/mL) infusion        Question:  Enter initial dose (Units/hr):  Answer:  1.8    -- IV Continuous 02/03/24 1812    02/03/24 1010  insulin aspart U-100 pen 0-10 Units         -- SubQ Every 4 hours PRN 02/03/24 0911            ASSESSMENT and PLAN    Renal/  * Ayaz's gangrene  Managed per " primary team  Optimize BG control        ALVARADO (acute kidney injury)  Lab Results   Component Value Date    CREATININE 3.4 (H) 02/06/2024     Avoid insulin stacking  Titrate insulin slowly       Endocrine  Morbid (severe) obesity due to excess calories  Body mass index is 57.67 kg/m².  May increase insulin resistance.         New onset type 2 diabetes mellitus  BG goal 140-180    No previous hx of T2DM per family at bedside. A1c of 10.4. DKA at OSH.     BG trending down overnight and IV insulin infusion stopped per protocol. Tube feeds off since midnight for OR. Patient continued to receive scheduled Novolog despite Tube feeds off.     Plan:  - Hold transition IV insulin infusion at this time   -Continue Novolog 14 units q 4 hrs while on tube feeding (HOLD if tube feeding is stopped or BG < 100) 20% dose increase; still requiring frequent prn SQ insulin correction   -Continue Moderate Dose Correction Scale  -BG monitoring q 4 hrs while NPO     ** Please call Endocrine for any BG related issues **      Discharge plans: TBD    Lab Results   Component Value Date    HGBA1C 10.4 (H) 01/30/2024             Zoie Muñiz NP  Endocrinology  [facility]

## 2024-02-06 NOTE — ASSESSMENT & PLAN NOTE
53 F Hx obesity currently admitted to surgical ICU as transfer from  for necrotizing fascitis s/p surgical debirdement (1/30/24) and Vascular Neurology consulted for multi territory infarcts as well as abnormal restricted diffusion on L temporal lobe concerning for possible infarct/septic emboli. Patient is currently s/p surgical debridement of R groin nec fasc w + cx proteus as well as s/p wound vac. Gen Neuro originally consulted due to worsening neuro exam on 2/3 and CTH showed  hypoattenuations at left anterior temporal lobe w/encephalomalacia, and bilateral centrum semiovale, left anterior corpus callosum, right caudate, subinsular region. In the interim patient having worsening leukocytosis up to 31K today while on CTX and metronidazole with clear cultures. MRI brain 2/5/24 showed several scattered punctate acute infarcts at BL frontal lobes, centrum semiovale, basal ganglia, corpus callosum, and cerebellar hemispheres as well as focal locunar area of diffusion restriction at L temporal lobe.     Given patient's current clinical scenario with on going infection, worsening leukocytosis concerned for embolic etiology likely septic and will need cardiac workup with TTE first to investigate for vegetation. Will also need vessel imaging to rule out mycotic aneurysm an to further investigate abnormal temporal findings, can not get gadolinium per radiology so recommending CTA h/n for now. Per radiology CNS infection/encephalitis possible, will discuss LP with neurology based on the above results.   Will need to hold on AC as if these are infectious higher risk of bleeding.       Antithrombotics for secondary stroke prevention: Antiplatelets: None: Risk of bleeding and need to rule out mycotic aneurysm     Statins for secondary stroke prevention and hyperlipidemia, if present:   Statins: Atorvastatin- 40 mg daily    Aggressive risk factor modification: HTN, HLD, Obesity, infection      Rehab efforts: The patient has  been evaluated by a stroke team provider and the therapy needs have been fully considered based off the presenting complaints and exam findings. The following therapy evaluations are needed:  when able     Diagnostics ordered/pending: CTA Head to assess vasculature , CTA Neck/Arch to assess vasculature, TTE to assess cardiac function/status, depending on TTE will consider JAY.     VTE prophylaxis: None: Reason for No Pharmacological VTE Prophylaxis: Bacterial endocarditis concern     BP parameters: Infarct: normotension

## 2024-02-06 NOTE — ASSESSMENT & PLAN NOTE
BG goal 140-180    No previous hx of T2DM per family at bedside. A1c of 10.4. DKA at OSH.     BG trending down overnight and IV insulin infusion stopped per protocol. Tube feeds off since midnight for OR. Patient continued to receive scheduled Novolog despite Tube feeds off.     Plan:  - Hold transition IV insulin infusion at this time   -Continue Novolog 14 units q 4 hrs while on tube feeding (HOLD if tube feeding is stopped or BG < 100) 20% dose increase; still requiring frequent prn SQ insulin correction   -Continue Moderate Dose Correction Scale  -BG monitoring q 4 hrs while NPO     ** Please call Endocrine for any BG related issues **      Discharge plans: TBD    Lab Results   Component Value Date    HGBA1C 10.4 (H) 01/30/2024

## 2024-02-06 NOTE — SUBJECTIVE & OBJECTIVE
"Interval HPI:   Overnight events: Remains intubated in SICU. POD 4. OR today for wound debridement.  BG trending down overnight on current IV/SQ insulin regimen. Creatinine 3.4.   Eating:   NPO  Nausea: No  Hypoglycemia and intervention: No  Fever: No  TPN and/or TF: Yes  If yes, type of TF/TPN and rate: TFs at 30 ml/hr (off since midnight)     /62 (BP Location: Left arm, Patient Position: Lying)   Pulse 97   Temp 98.8 °F (37.1 °C) (Oral)   Resp 20   Ht 5' 5.98" (1.676 m)   Wt (!) 162 kg (357 lb 2.3 oz)   LMP 01/01/2024 (Approximate) Comment: tubal ligation  SpO2 100%   BMI 57.67 kg/m²     Labs Reviewed and Include    Recent Labs   Lab 02/06/24  0339   GLU 88   CALCIUM 8.1*   ALBUMIN 1.6*      K 3.9   CO2 23      BUN 30*   CREATININE 3.4*     Lab Results   Component Value Date    WBC 32.17 (H) 02/06/2024    HGB 7.9 (L) 02/06/2024    HCT 23.5 (L) 02/06/2024    MCV 79 (L) 02/06/2024     02/06/2024     No results for input(s): "TSH", "FREET4" in the last 168 hours.  Lab Results   Component Value Date    HGBA1C 10.4 (H) 01/30/2024       Nutritional status:   Body mass index is 57.67 kg/m².  Lab Results   Component Value Date    ALBUMIN 1.6 (L) 02/06/2024    ALBUMIN 1.7 (L) 02/05/2024    ALBUMIN 1.6 (L) 02/05/2024     No results found for: "PREALBUMIN"    Estimated Creatinine Clearance: 30.3 mL/min (A) (based on SCr of 3.4 mg/dL (H)).    Accu-Checks  Recent Labs     02/05/24  0759 02/05/24  1126 02/05/24  1636 02/05/24  1638 02/05/24  2026 02/05/24  2335 02/06/24  0032 02/06/24  0411 02/06/24  0527 02/06/24  0736   POCTGLUCOSE 251* 205* 131* 144* 153* 138* 136* 97 112* 145*       Current Medications and/or Treatments Impacting Glycemic Control  Immunotherapy:    Immunosuppressants       None          Steroids:   Hormones (From admission, onward)      Start     Stop Route Frequency Ordered    01/30/24 0114  melatonin tablet 6 mg         -- Oral Nightly PRN 01/30/24 0016          Pressors:  "   Autonomic Drugs (From admission, onward)      None          Hyperglycemia/Diabetes Medications:   Antihyperglycemics (From admission, onward)      Start     Stop Route Frequency Ordered    02/05/24 1200  insulin aspart U-100 pen 14 Units         -- SubQ Every 4 hours 02/05/24 0822    02/03/24 1915  insulin regular in 0.9 % NaCl 100 unit/100 mL (1 unit/mL) infusion        Question:  Enter initial dose (Units/hr):  Answer:  1.8    -- IV Continuous 02/03/24 1812    02/03/24 1010  insulin aspart U-100 pen 0-10 Units         -- SubQ Every 4 hours PRN 02/03/24 0911

## 2024-02-06 NOTE — PLAN OF CARE
Please link this note to the resident's progress note. This note serves as the attestation.    In brief, Sarah Saravia is a 53 year-old woman with a history of hypertension, morbid obesity, renal insufficiency, and type 2 diabetes who was admitted as a transfer for necrotizing soft tissue infection.  She has undergone debridements for treatment.  She was also in acute renal failure and has multifactorial encephalopathy currently being worked up.    Critical Care issues in this patient include:    Ayaz's gangrene   * Going to the OR on 2/6/2024 for repeat debridement. Continue rocephin and flagyl. ID consulted and are following. Appreciate their recommendations. Would cultures growing pansensitive proteus mirabilis. Blood cultures have been NGTD.    Encephalopathy, metabolic   * Likely due to sepsis and severe infection. Work up ongoing. Neurology consulted and are following. EEG done and shows severe slowing and no seizures. MRI brain today.    Acute renal failure with tubular necrosis   * Making very small amounts of urine. Ok to remove meza. Bladder scans to monitor UOP. Nephrology has been consulted and are following. Appreciate recs. RRT plans per them. Monitor electrolytes.    Type 2 diabetes mellitus with hyperglycemia   * Endocrinology consulted and are following. Appreciate their recs. Poorly controlled BG at baseline with recent Hgb A1c at 10.4%. Continue with hourly glucose checks and titrate insulin infusion per protocol.    Encounter for weaning the ventilator   * Remains on pressure support. Cannot extubate secondary to poor mental status. Continue with CXR as needed.     Patient remains encephalopathic in the ICU. Keep sedation off. Encourage staff to give po pain meds first rather than IV as the IV narcotic don't last as long. Hold TFs at midnight for surgery tomorrow. Increase amlodipine for hypertension. Continue GI and DVT prophylaxis. Continue ICU care.    Critical care time spent: 40  min    Critical care was time spent personally by me on the following activities: development of treatment plan with patient or surrogate and bedside caregivers, discussions with consultants, evaluation of patient's response to treatment, examination of patient, ordering and performing treatments and interventions, ordering and review of laboratory studies, ordering and review of radiographic studies, pulse oximetry, re-evaluation of patient's condition.  This critical care time did not overlap with that of any other provider or involve time for any procedures.      Karla Connelly MD, [facility]  General Surgery and Surgical Critical Care  [facility]

## 2024-02-06 NOTE — PROGRESS NOTES
[facility] - Surgical Intensive Care  General Surgery  Progress Note    Subjective:     History of Present Illness:  53 year old morbidly obese female who does not routinely go to the doctor presents to the ED with malaise, nausea, vomiting, and groin, buttock, perineal swelling and tenderness. She began feeling ill a few days ago.     On arrival to the ED she is tachycardic to 120, hypertensive without fever. Labs demonstrate leukocytosis of 21, , with lactic acidosis and associated ALVARADO with cr 3.8, hyperglycemia with blood glucose of 824 accompanied by severe electrolyte derangements. CT imaging obtained demonstrates diffuse subcutaneous air along buttocks, perineum, anterior vulva, as well as bilateral thighs.     No prior abdominal surgeries. She denies problems with her heart or lungs. Non smoker. Does not routinely take any medications.    Post-Op Info:  Procedure(s) (LRB):  DEBRIDEMENT, WOUND (Bilateral)   4 Days Post-Op     Interval History: NAEO. CRRT clotted twice overnight. Minimal meaningful neurologic response off sedation. On and off spontaneous mechanical ventilation. WBC 32 from 29. AF, HDS    Medications:  Continuous Infusions:   calcium gluconate 3 g in dextrose 5 % (D5W) 100 mL infusion Stopped (02/06/24 0211)    dextrose 10 % in water (D10W)      dextrose-sod citrate-citric ac Stopped (02/06/24 0211)    insulin regular 1 units/mL infusion orderable (TRANSFER) Stopped (02/06/24 0414)     Scheduled Meds:   acetaminophen  650 mg Per NG tube Q6H    amLODIPine  10 mg Per OG tube Daily    cefTRIAXone (Rocephin) IV (PEDS and ADULTS)  2 g Intravenous Q24H    famotidine  20 mg Per OG tube Daily    heparin (porcine)  7,500 Units Subcutaneous Q8H    insulin aspart U-100  14 Units Subcutaneous Q4H    metronidazole  500 mg Intravenous Q8H    psyllium husk (aspartame)  1 packet Per OG tube BID     PRN Meds:sodium chloride 0.9%, albuterol-ipratropium, dextrose 10 % in water (D10W), dextrose 10%, dextrose  10%, glucagon (human recombinant), glucose, glucose, hydrALAZINE, HYDROmorphone, insulin aspart U-100, labetalol, magnesium sulfate IVPB, melatonin, naloxone, ondansetron, oxyCODONE, oxyCODONE, prochlorperazine, sodium chloride 0.9%, sodium chloride 0.9%, sodium chloride 0.9%, sodium phosphate 20.01 mmol in dextrose 5 % (D5W) 250 mL IVPB, sodium phosphate 30 mmol in dextrose 5 % (D5W) 250 mL IVPB, sodium phosphate 39.99 mmol in dextrose 5 % (D5W) 250 mL IVPB     Review of patient's allergies indicates:  No Known Allergies  Objective:     Vital Signs (Most Recent):  Temp: 99.4 °F (37.4 °C) (02/06/24 0400)  Pulse: 93 (02/06/24 0730)  Resp: (!) 23 (02/06/24 0730)  BP: 132/62 (02/06/24 0700)  SpO2: 100 % (02/06/24 0730) Vital Signs (24h Range):  Temp:  [98.3 °F (36.8 °C)-100.3 °F (37.9 °C)] 99.4 °F (37.4 °C)  Pulse:  [] 93  Resp:  [14-28] 23  SpO2:  [98 %-100 %] 100 %  BP: (132-174)/(62-94) 132/62  Arterial Line BP: (130-179)/(59-88) 130/59     Weight: (!) 162 kg (357 lb 2.3 oz)  Body mass index is 57.67 kg/m².    Intake/Output - Last 3 Shifts         02/04 0700 02/05 0659 02/05 0700 02/06 0659 02/06 0700 02/07 0659    I.V. (mL/kg) 77.4 (0.5) 150 (0.9) 16.6 (0.1)    Other  750     NG/ 300     IV Piggyback 959.8 1834.9     Total Intake(mL/kg) 1487.3 (9) 3034.9 (18.7) 16.6 (0.1)    Urine (mL/kg/hr) 160 (0) 20 (0)     Drains 15      Other 804 2738     Stool 350 70     Total Output 1329 2828     Net +158.3 +206.9 +16.6           Stool Occurrence 2 x               Physical Exam  Vitals reviewed.   Constitutional:       General: She is not in acute distress.     Appearance: She is obese. She is ill-appearing.   HENT:      Head: Normocephalic.   Neck:      Comments: Right IJ trialysis   Cardiovascular:      Rate and Rhythm: Regular rhythm.      Pulses: Normal pulses.   Pulmonary:      Effort: Pulmonary effort is normal.      Comments: Mechanically ventilated   Abdominal:      Palpations: Abdomen is soft.       Tenderness: There is no abdominal tenderness.   Genitourinary:     Comments: Glynn in place with clear yellow urine   Musculoskeletal:      Comments: Wound vac in place with adequate seal. Surrounding skin soft edematous, no crepitus    Skin:     General: Skin is warm and dry.   Neurological:      General: No focal deficit present.          Significant Labs:  I have reviewed all pertinent lab results within the past 24 hours.  CBC:   Recent Labs   Lab 02/06/24  0339   WBC 32.17*   RBC 2.98*   HGB 7.9*   HCT 23.5*      MCV 79*   MCH 26.5*   MCHC 33.6     CMP:   Recent Labs   Lab 02/05/24  0321 02/05/24  1348 02/06/24  0339   *  227*   < > 88   CALCIUM 8.2*  8.1*   < > 8.1*   ALBUMIN 1.6*  1.6*   < > 1.6*   PROT 6.8  --   --      135*   < > 136   K 3.9  3.8   < > 3.9   CO2 19*  20*   < > 23     105   < > 105   BUN 52*  52*   < > 30*   CREATININE 5.2*  5.2*   < > 3.4*   ALKPHOS 150*  --   --    ALT 15  --   --    AST 30  --   --    BILITOT 0.3  --   --     < > = values in this interval not displayed.       Significant Diagnostics:  I have reviewed all pertinent imaging results/findings within the past 24 hours.  Assessment/Plan:     * Ayaz's gangrene  53 y.o. 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       - To OR today for wound vac change, possible lymph node biopsy  - Daily SBTs  - Okay for DVT ppx   - Remainder of care per SICU, appreciate the help         Chicho Dale MD  General Surgery  [facility]

## 2024-02-06 NOTE — ASSESSMENT & PLAN NOTE
53 y.o. 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       - To OR today for wound vac change, possible lymph node biopsy  - Daily SBTs  - Okay for DVT ppx   - Remainder of care per SICU, appreciate the help

## 2024-02-07 PROBLEM — I63.9 CEREBRAL INFARCTION: Status: ACTIVE | Noted: 2024-02-07

## 2024-02-07 PROBLEM — Z51.5 ENCOUNTER FOR PALLIATIVE CARE: Status: ACTIVE | Noted: 2024-02-07

## 2024-02-07 LAB
ALBUMIN SERPL BCP-MCNC: 1.8 G/DL (ref 3.5–5.2)
ALBUMIN SERPL BCP-MCNC: 1.8 G/DL (ref 3.5–5.2)
ALP SERPL-CCNC: 153 U/L (ref 55–135)
ALT SERPL W/O P-5'-P-CCNC: 13 U/L (ref 10–44)
ANION GAP SERPL CALC-SCNC: 14 MMOL/L (ref 8–16)
ANION GAP SERPL CALC-SCNC: 14 MMOL/L (ref 8–16)
ANISOCYTOSIS BLD QL SMEAR: SLIGHT
AST SERPL-CCNC: 32 U/L (ref 10–40)
BACTERIA SPEC ANAEROBE CULT: NORMAL
BASOPHILS NFR BLD: 1 % (ref 0–1.9)
BILIRUB SERPL-MCNC: 0.3 MG/DL (ref 0.1–1)
BUN SERPL-MCNC: 31 MG/DL (ref 6–20)
BUN SERPL-MCNC: 31 MG/DL (ref 6–20)
CA-I BLDV-SCNC: 1.03 MMOL/L (ref 1.06–1.42)
CA-I BLDV-SCNC: 1.09 MMOL/L (ref 1.06–1.42)
CALCIUM SERPL-MCNC: 8.4 MG/DL (ref 8.7–10.5)
CALCIUM SERPL-MCNC: 8.4 MG/DL (ref 8.7–10.5)
CHLORIDE SERPL-SCNC: 104 MMOL/L (ref 95–110)
CHLORIDE SERPL-SCNC: 104 MMOL/L (ref 95–110)
CO2 SERPL-SCNC: 15 MMOL/L (ref 23–29)
CO2 SERPL-SCNC: 15 MMOL/L (ref 23–29)
CREAT SERPL-MCNC: 3.4 MG/DL (ref 0.5–1.4)
CREAT SERPL-MCNC: 3.4 MG/DL (ref 0.5–1.4)
DIFFERENTIAL METHOD BLD: ABNORMAL
EOSINOPHIL NFR BLD: 0 % (ref 0–8)
ERYTHROCYTE [DISTWIDTH] IN BLOOD BY AUTOMATED COUNT: 15.6 % (ref 11.5–14.5)
EST. GFR  (NO RACE VARIABLE): 15.5 ML/MIN/1.73 M^2
EST. GFR  (NO RACE VARIABLE): 15.5 ML/MIN/1.73 M^2
GLUCOSE SERPL-MCNC: 142 MG/DL (ref 70–110)
GLUCOSE SERPL-MCNC: 142 MG/DL (ref 70–110)
HCT VFR BLD AUTO: 25.7 % (ref 37–48.5)
HGB BLD-MCNC: 8.4 G/DL (ref 12–16)
HYPOCHROMIA BLD QL SMEAR: ABNORMAL
IMM GRANULOCYTES # BLD AUTO: ABNORMAL K/UL (ref 0–0.04)
IMM GRANULOCYTES NFR BLD AUTO: ABNORMAL % (ref 0–0.5)
LYMPHOCYTES NFR BLD: 8 % (ref 18–48)
MAGNESIUM SERPL-MCNC: 2.2 MG/DL (ref 1.6–2.6)
MAGNESIUM SERPL-MCNC: 2.2 MG/DL (ref 1.6–2.6)
MCH RBC QN AUTO: 26.5 PG (ref 27–31)
MCHC RBC AUTO-ENTMCNC: 32.7 G/DL (ref 32–36)
MCV RBC AUTO: 81 FL (ref 82–98)
METAMYELOCYTES NFR BLD MANUAL: 2 %
MONOCYTES NFR BLD: 5 % (ref 4–15)
MYELOCYTES NFR BLD MANUAL: 1 %
NEUTROPHILS NFR BLD: 74 % (ref 38–73)
NEUTS BAND NFR BLD MANUAL: 9 %
NRBC BLD-RTO: 0 /100 WBC
OVALOCYTES BLD QL SMEAR: ABNORMAL
PHOSPHATE SERPL-MCNC: 5.2 MG/DL (ref 2.7–4.5)
PHOSPHATE SERPL-MCNC: 5.2 MG/DL (ref 2.7–4.5)
PLATELET # BLD AUTO: 280 K/UL (ref 150–450)
PLATELET BLD QL SMEAR: ABNORMAL
PMV BLD AUTO: 10.6 FL (ref 9.2–12.9)
POCT GLUCOSE: 135 MG/DL (ref 70–110)
POCT GLUCOSE: 137 MG/DL (ref 70–110)
POCT GLUCOSE: 144 MG/DL (ref 70–110)
POCT GLUCOSE: 152 MG/DL (ref 70–110)
POCT GLUCOSE: 154 MG/DL (ref 70–110)
POCT GLUCOSE: 163 MG/DL (ref 70–110)
POCT GLUCOSE: 184 MG/DL (ref 70–110)
POIKILOCYTOSIS BLD QL SMEAR: SLIGHT
POLYCHROMASIA BLD QL SMEAR: ABNORMAL
POTASSIUM SERPL-SCNC: 4.4 MMOL/L (ref 3.5–5.1)
POTASSIUM SERPL-SCNC: 4.4 MMOL/L (ref 3.5–5.1)
PROT SERPL-MCNC: 7.3 G/DL (ref 6–8.4)
RBC # BLD AUTO: 3.17 M/UL (ref 4–5.4)
RPR SER QL: NORMAL
SODIUM SERPL-SCNC: 133 MMOL/L (ref 136–145)
SODIUM SERPL-SCNC: 133 MMOL/L (ref 136–145)
SPHEROCYTES BLD QL SMEAR: ABNORMAL
TARGETS BLD QL SMEAR: ABNORMAL
WBC # BLD AUTO: 31.47 K/UL (ref 3.9–12.7)

## 2024-02-07 PROCEDURE — 25000242 PHARM REV CODE 250 ALT 637 W/ HCPCS

## 2024-02-07 PROCEDURE — 27100171 HC OXYGEN HIGH FLOW UP TO 24 HOURS

## 2024-02-07 PROCEDURE — 99233 SBSQ HOSP IP/OBS HIGH 50: CPT | Mod: ,,, | Performed by: INTERNAL MEDICINE

## 2024-02-07 PROCEDURE — 63600175 PHARM REV CODE 636 W HCPCS

## 2024-02-07 PROCEDURE — 80053 COMPREHEN METABOLIC PANEL: CPT | Performed by: STUDENT IN AN ORGANIZED HEALTH CARE EDUCATION/TRAINING PROGRAM

## 2024-02-07 PROCEDURE — 99291 CRITICAL CARE FIRST HOUR: CPT | Mod: 24,,, | Performed by: STUDENT IN AN ORGANIZED HEALTH CARE EDUCATION/TRAINING PROGRAM

## 2024-02-07 PROCEDURE — 99900026 HC AIRWAY MAINTENANCE (STAT)

## 2024-02-07 PROCEDURE — 25000003 PHARM REV CODE 250

## 2024-02-07 PROCEDURE — 63600175 PHARM REV CODE 636 W HCPCS: Performed by: HOSPITALIST

## 2024-02-07 PROCEDURE — 99232 SBSQ HOSP IP/OBS MODERATE 35: CPT | Mod: ,,, | Performed by: NURSE PRACTITIONER

## 2024-02-07 PROCEDURE — 99223 1ST HOSP IP/OBS HIGH 75: CPT | Mod: ,,, | Performed by: STUDENT IN AN ORGANIZED HEALTH CARE EDUCATION/TRAINING PROGRAM

## 2024-02-07 PROCEDURE — 80069 RENAL FUNCTION PANEL: CPT | Performed by: NURSE PRACTITIONER

## 2024-02-07 PROCEDURE — 99233 SBSQ HOSP IP/OBS HIGH 50: CPT | Mod: ,,, | Performed by: STUDENT IN AN ORGANIZED HEALTH CARE EDUCATION/TRAINING PROGRAM

## 2024-02-07 PROCEDURE — 63600175 PHARM REV CODE 636 W HCPCS: Mod: JZ,JG | Performed by: INTERNAL MEDICINE

## 2024-02-07 PROCEDURE — 20000000 HC ICU ROOM

## 2024-02-07 PROCEDURE — 94761 N-INVAS EAR/PLS OXIMETRY MLT: CPT

## 2024-02-07 PROCEDURE — 82330 ASSAY OF CALCIUM: CPT | Performed by: NURSE PRACTITIONER

## 2024-02-07 PROCEDURE — 85007 BL SMEAR W/DIFF WBC COUNT: CPT | Performed by: STUDENT IN AN ORGANIZED HEALTH CARE EDUCATION/TRAINING PROGRAM

## 2024-02-07 PROCEDURE — 99900035 HC TECH TIME PER 15 MIN (STAT)

## 2024-02-07 PROCEDURE — 94003 VENT MGMT INPAT SUBQ DAY: CPT

## 2024-02-07 PROCEDURE — 25000003 PHARM REV CODE 250: Performed by: STUDENT IN AN ORGANIZED HEALTH CARE EDUCATION/TRAINING PROGRAM

## 2024-02-07 PROCEDURE — 83735 ASSAY OF MAGNESIUM: CPT

## 2024-02-07 PROCEDURE — 85027 COMPLETE CBC AUTOMATED: CPT | Performed by: STUDENT IN AN ORGANIZED HEALTH CARE EDUCATION/TRAINING PROGRAM

## 2024-02-07 RX ORDER — INSULIN ASPART 100 [IU]/ML
12 INJECTION, SOLUTION INTRAVENOUS; SUBCUTANEOUS EVERY 4 HOURS
Status: DISCONTINUED | OUTPATIENT
Start: 2024-02-07 | End: 2024-02-07

## 2024-02-07 RX ORDER — ACETAMINOPHEN 325 MG/1
650 TABLET ORAL EVERY 6 HOURS
Status: DISCONTINUED | OUTPATIENT
Start: 2024-02-07 | End: 2024-02-10

## 2024-02-07 RX ORDER — CALCIUM GLUCONATE 20 MG/ML
1 INJECTION, SOLUTION INTRAVENOUS
Status: COMPLETED | OUTPATIENT
Start: 2024-02-07 | End: 2024-02-07

## 2024-02-07 RX ORDER — INSULIN ASPART 100 [IU]/ML
10 INJECTION, SOLUTION INTRAVENOUS; SUBCUTANEOUS EVERY 4 HOURS
Status: DISCONTINUED | OUTPATIENT
Start: 2024-02-07 | End: 2024-02-09

## 2024-02-07 RX ORDER — CARVEDILOL 3.12 MG/1
3.12 TABLET ORAL 2 TIMES DAILY
Status: DISCONTINUED | OUTPATIENT
Start: 2024-02-07 | End: 2024-02-08

## 2024-02-07 RX ADMIN — INSULIN ASPART 14 UNITS: 100 INJECTION, SOLUTION INTRAVENOUS; SUBCUTANEOUS at 03:02

## 2024-02-07 RX ADMIN — HEPARIN SODIUM 7500 UNITS: 5000 INJECTION INTRAVENOUS; SUBCUTANEOUS at 02:02

## 2024-02-07 RX ADMIN — CARVEDILOL 3.12 MG: 3.12 TABLET, FILM COATED ORAL at 10:02

## 2024-02-07 RX ADMIN — HYDRALAZINE HYDROCHLORIDE 10 MG: 20 INJECTION, SOLUTION INTRAMUSCULAR; INTRAVENOUS at 05:02

## 2024-02-07 RX ADMIN — CEFTRIAXONE 2 G: 2 INJECTION, POWDER, FOR SOLUTION INTRAMUSCULAR; INTRAVENOUS at 08:02

## 2024-02-07 RX ADMIN — ACETAMINOPHEN 650 MG: 325 TABLET ORAL at 05:02

## 2024-02-07 RX ADMIN — INSULIN ASPART 2 UNITS: 100 INJECTION, SOLUTION INTRAVENOUS; SUBCUTANEOUS at 08:02

## 2024-02-07 RX ADMIN — METRONIDAZOLE 500 MG: 5 INJECTION, SOLUTION INTRAVENOUS at 10:02

## 2024-02-07 RX ADMIN — INSULIN ASPART 2 UNITS: 100 INJECTION, SOLUTION INTRAVENOUS; SUBCUTANEOUS at 05:02

## 2024-02-07 RX ADMIN — PSYLLIUM HUSK 1 PACKET: 3.4 POWDER ORAL at 10:02

## 2024-02-07 RX ADMIN — INSULIN ASPART 12 UNITS: 100 INJECTION, SOLUTION INTRAVENOUS; SUBCUTANEOUS at 08:02

## 2024-02-07 RX ADMIN — INSULIN ASPART 6 UNITS: 100 INJECTION, SOLUTION INTRAVENOUS; SUBCUTANEOUS at 05:02

## 2024-02-07 RX ADMIN — AMLODIPINE BESYLATE 10 MG: 10 TABLET ORAL at 08:02

## 2024-02-07 RX ADMIN — METRONIDAZOLE 500 MG: 5 INJECTION, SOLUTION INTRAVENOUS at 06:02

## 2024-02-07 RX ADMIN — CARVEDILOL 3.12 MG: 3.12 TABLET, FILM COATED ORAL at 09:02

## 2024-02-07 RX ADMIN — OXYCODONE HYDROCHLORIDE 10 MG: 5 SOLUTION ORAL at 04:02

## 2024-02-07 RX ADMIN — HYDROMORPHONE HYDROCHLORIDE 0.5 MG: 1 INJECTION, SOLUTION INTRAMUSCULAR; INTRAVENOUS; SUBCUTANEOUS at 11:02

## 2024-02-07 RX ADMIN — CALCIUM GLUCONATE 1 G: 20 INJECTION, SOLUTION INTRAVENOUS at 11:02

## 2024-02-07 RX ADMIN — HEPARIN SODIUM 7500 UNITS: 5000 INJECTION INTRAVENOUS; SUBCUTANEOUS at 05:02

## 2024-02-07 RX ADMIN — CALCIUM GLUCONATE 1 G: 20 INJECTION, SOLUTION INTRAVENOUS at 10:02

## 2024-02-07 RX ADMIN — ACETAMINOPHEN 650 MG: 325 TABLET ORAL at 06:02

## 2024-02-07 RX ADMIN — INSULIN ASPART 1 UNITS: 100 INJECTION, SOLUTION INTRAVENOUS; SUBCUTANEOUS at 07:02

## 2024-02-07 RX ADMIN — FAMOTIDINE 20 MG: 20 TABLET, FILM COATED ORAL at 08:02

## 2024-02-07 RX ADMIN — ACETAMINOPHEN 650 MG: 325 TABLET ORAL at 12:02

## 2024-02-07 RX ADMIN — INSULIN ASPART 12 UNITS: 100 INJECTION, SOLUTION INTRAVENOUS; SUBCUTANEOUS at 12:02

## 2024-02-07 RX ADMIN — INSULIN ASPART 10 UNITS: 100 INJECTION, SOLUTION INTRAVENOUS; SUBCUTANEOUS at 07:02

## 2024-02-07 RX ADMIN — SODIUM CHLORIDE: 9 INJECTION, SOLUTION INTRAVENOUS at 12:02

## 2024-02-07 RX ADMIN — PSYLLIUM HUSK 1 PACKET: 3.4 POWDER ORAL at 09:02

## 2024-02-07 RX ADMIN — HYDROMORPHONE HYDROCHLORIDE 0.5 MG: 1 INJECTION, SOLUTION INTRAMUSCULAR; INTRAVENOUS; SUBCUTANEOUS at 03:02

## 2024-02-07 RX ADMIN — INSULIN ASPART 14 UNITS: 100 INJECTION, SOLUTION INTRAVENOUS; SUBCUTANEOUS at 12:02

## 2024-02-07 RX ADMIN — OXYCODONE HYDROCHLORIDE 10 MG: 5 SOLUTION ORAL at 09:02

## 2024-02-07 RX ADMIN — METRONIDAZOLE 500 MG: 5 INJECTION, SOLUTION INTRAVENOUS at 02:02

## 2024-02-07 RX ADMIN — INSULIN ASPART 2 UNITS: 100 INJECTION, SOLUTION INTRAVENOUS; SUBCUTANEOUS at 12:02

## 2024-02-07 NOTE — SUBJECTIVE & OBJECTIVE
Neurologic Chief Complaint: B/L frontal lobes scattered punctate infarcts     Subjective:     Interval History: Patient is seen for follow-up neurological assessment and treatment recommendations: Worsening neurological exam (disconjugate gaze, L>R). NIH 30. Stat CTH.     HPI, Past Medical, Family, and Social History remains the same as documented in the initial encounter.     Review of Systems   Unable to perform ROS: Intubated     Scheduled Meds:   acetaminophen  650 mg Per OG tube Q6H    amLODIPine  10 mg Per OG tube Daily    carvediloL  3.125 mg Per OG tube BID    cefTRIAXone (Rocephin) IV (PEDS and ADULTS)  2 g Intravenous Q24H    famotidine  20 mg Per OG tube Daily    heparin (porcine)  7,500 Units Subcutaneous Q8H    insulin aspart U-100  12 Units Subcutaneous Q4H    metronidazole  500 mg Intravenous Q8H    psyllium husk (aspartame)  1 packet Per OG tube BID     Continuous Infusions:   dextrose 10 % in water (D10W)      insulin regular 1 units/mL infusion orderable (TRANSFER) 0.8 Units/hr (02/07/24 1500)     PRN Meds:sodium chloride 0.9%, albuterol-ipratropium, dextrose 10 % in water (D10W), dextrose 10%, dextrose 10%, glucagon (human recombinant), glucose, glucose, hydrALAZINE, HYDROmorphone, insulin aspart U-100, labetalol, naloxone, ondansetron, oxyCODONE, oxyCODONE, prochlorperazine, sodium chloride 0.9%, sodium chloride 0.9%, sodium chloride 0.9%    Objective:     Vital Signs (Most Recent):  Temp: 98.2 °F (36.8 °C) (02/07/24 1215)  Pulse: 92 (02/07/24 1415)  Resp: 19 (02/07/24 1415)  BP: 127/62 (02/07/24 1400)  SpO2: 100 % (02/07/24 1415)  BP Location: Left forearm    Vital Signs Range (Last 24H):  Temp:  [98.2 °F (36.8 °C)-99.5 °F (37.5 °C)]   Pulse:  []   Resp:  [15-30]   BP: (114-157)/(57-95)   SpO2:  [98 %-100 %]   Arterial Line BP: (114-177)/(54-89)   BP Location: Left forearm       Physical Exam  Constitutional:       Appearance: She is obese. She is ill-appearing.   HENT:      Mouth/Throat:  "     Comments: Intubated   Cardiovascular:      Rate and Rhythm: Normal rate.   Pulmonary:      Comments: Intubated   Neurological:      Cranial Nerves: Cranial nerve deficit present.      Comments: Intubated, not on sedation. Not following commands.               Neurological Exam:   LOC: comatose  Attention Span: poor  Language: Intubated  Orientation: Untestable due to intubation  Facial Movement (CN VII): Unable to test   Gag Reflex: present  Motor: Arm left  Plegia 0/5  Leg left  Plegia 0/5  Arm right  Plegia 0/5  Leg right Plegia 0/5    Laboratory:  CMP:   Recent Labs   Lab 02/07/24  0308   CALCIUM 8.4*  8.4*   ALBUMIN 1.8*  1.8*   PROT 7.3   *  133*   K 4.4  4.4   CO2 15*  15*     104   BUN 31*  31*   CREATININE 3.4*  3.4*   ALKPHOS 153*   ALT 13   AST 32   BILITOT 0.3     BMP:   Recent Labs   Lab 02/07/24  0308   *  133*   K 4.4  4.4     104   CO2 15*  15*   BUN 31*  31*   CREATININE 3.4*  3.4*   CALCIUM 8.4*  8.4*     CBC:   Recent Labs   Lab 02/07/24  0308   WBC 31.47*   RBC 3.17*   HGB 8.4*   HCT 25.7*      MCV 81*   MCH 26.5*   MCHC 32.7     Lipid Panel: No results for input(s): "CHOL", "LDLCALC", "HDL", "TRIG" in the last 168 hours.  Coagulation:   Recent Labs   Lab 02/01/24  2246   INR 1.0   APTT 27.8     Platelet Aggregation Study: No results for input(s): "PLTAGG", "PLTAGINTERP", "PLTAGREGLACO", "ADPPLTAGGREG" in the last 168 hours.  Hgb A1C: No results for input(s): "HGBA1C" in the last 168 hours.  TSH: No results for input(s): "TSH" in the last 168 hours.    Diagnostic Results     Brain Imaging/Vessel Imaging    CTA H&N - 02/06/2024       Consult Orders   Inpatient consult to Vascular (Stroke) Neurology [[id number]] ordered by Shala Stafford MD              Attestation signed by Zaid Longo MD at 2/7/2024 12:24 PM     I have seen the patient, reviewed the Resident's history and physical, assessment and plan, personally reviewed the " imaging studies reported on the note, and agree with the findings.   I also performed the MDM (medical decision making) for this patient as detailed in the resident's note.     .Zaid Longo MD   Vascular Neurology  [facility]  [facility] - Surgical Intensive [facility]  Vascular Neurology  [facility]  Consult Note     Inpatient consult to Vascular (Stroke) Neurology  Consult performed by: Shala Stafford MD  Consult ordered by: Shala Stafford MD           Assessment/Plan:      Patient is a 53 y.o. year old female with:     Ac isch multi vasc territories stroke  53 F Hx obesity currently admitted to surgical ICU as transfer from  for necrotizing fascitis s/p surgical debirdement (1/30/24) and Vascular Neurology consulted for multi territory infarcts as well as abnormal restricted diffusion on L temporal lobe concerning for possible infarct/septic emboli. Patient is currently s/p surgical debridement of R groin nec fasc w + cx proteus as well as s/p wound vac. Gen Neuro originally consulted due to worsening neuro exam on 2/3 and CTH showed  hypoattenuations at left anterior temporal lobe w/encephalomalacia, and bilateral centrum semiovale, left anterior corpus callosum, right caudate, subinsular region. In the interim patient having worsening leukocytosis up to 31K today while on CTX and metronidazole with clear cultures. MRI brain 2/5/24 showed several scattered punctate acute infarcts at BL frontal lobes, centrum semiovale, basal ganglia, corpus callosum, and cerebellar hemispheres as well as focal locunar area of diffusion restriction at L temporal lobe.      Given patient's current clinical scenario with on going infection, worsening leukocytosis concerned for embolic etiology likely septic and will need cardiac workup with TTE first to investigate for vegetation. Will also need vessel imaging to rule out mycotic  aneurysm an to further investigate abnormal temporal findings, can not get gadolinium per radiology so recommending CTA h/n for now. Per radiology CNS infection/encephalitis possible, will discuss LP with neurology based on the above results.   Will need to hold on AC as if these are infectious higher risk of bleeding.         Antithrombotics for secondary stroke prevention: Antiplatelets: None: Risk of bleeding and need to rule out mycotic aneurysm      Statins for secondary stroke prevention and hyperlipidemia, if present:   Statins: Atorvastatin- 40 mg daily     Aggressive risk factor modification: HTN, HLD, Obesity, infection      Rehab efforts: The patient has been evaluated by a stroke team provider and the therapy needs have been fully considered based off the presenting complaints and exam findings. The following therapy evaluations are needed:  when able      Diagnostics ordered/pending: CTA Head to assess vasculature , CTA Neck/Arch to assess vasculature, TTE to assess cardiac function/status, depending on TTE will consider JAY.      VTE prophylaxis: None: Reason for No Pharmacological VTE Prophylaxis: Bacterial endocarditis concern      BP parameters: Infarct: normotension            New onset type 2 diabetes mellitus  Stroke RF   Endo following   - high risk of immunocompromise state            STROKE DOCUMENTATION         NIH Scale:  1a. Level of Consciousness: 2-->Not alert, requires repeated stimulation to attend, or is obtunded and requires strong or painful stimulation to make movements (not stereotyped)  1b. LOC Questions: 2-->Answers neither question correctly  1c. LOC Commands: 2-->Performs neither task correctly  2. Best Gaze: 0-->Normal  3. Visual: 0-->No visual loss  4. Facial Palsy: 0-->Normal symmetrical movements  5a. Motor Arm, Left: 4-->No movement  5b. Motor Arm, Right: 4-->No movement  6a. Motor Leg, Left: 4-->No movement  6b. Motor Leg, Right: 4-->No movement  7. Limb Ataxia:  0-->Absent  8. Sensory: 1-->Mild-to-moderate sensory loss, patient feels pinprick is less sharp or is dull on the affected side, or there is a loss of superficial pain with pinprick, but patient is aware of being touched  9. Best Language: 3-->Mute, global aphasia, no usable speech or auditory comprehension  10. Dysarthria: (UN) Intubated or other physical barrier  11. Extinction and Inattention (formerly Neglect): 2-->Profound lamar-inattention/extinction more than 1 modality  Total (NIH Stroke Scale): 28     Modified [place]    Cece Coma Scale:    ABCD2 Score:    [id number] Score:   HAS -BLED Score:   ICH Score:   Hunt & Miles Classification:         Thrombolysis Candidate? No, Out of window - Symptom onset > 4.5 hours     Delays to Thrombolysis?  No     Interventional Revascularization Candidate?   Is the patient eligible for mechanical endovascular reperfusion (MAGDALENA)?  No; Large core infarct on imaging      Delays to Thrombectomy? No     Hemorrhagic change of an Ischemic Stroke: Does this patient have an ischemic stroke with hemorrhagic changes? No      Subjective:      History of Present Illness:  53 F Hx obesity currently admitted to surgical ICU as transfer from  for necrotizing fascitis s/p surgical debirdement (1/30/24) and Vascular Neurology consulted for multi territory infarcts as well as abnormal restricted diffusion on L temporal lobe concerning for possible infarct/septic emboli. Patient presented to OSH w/nausea and vomiting, right groin wound, found to in DKA. CT abdomen/pelvis w/extensive soft tissue air throughout the right groin and inguinal region extending to the RLQ, anterior abdominal wall, right proximal/medial thigh w/extensive soft tissue air concerning for gas-forming infection. S/p OR debridement for necrotizing fasciitis on 1/29 and 1/31: cx growing proteus. Hospital course c/b respiratory failure requiring intubation, ALVARADO in setting of lactic acidosis and septic shock, presumably  ATN, on SLED. Nephro/Endo and ID following and she was initially cefepime and meropenem and now on CTX and metronidazole. Per documentation on 2/2 patient was spontaneously moving her extremities but had neuro change on 2/3 for which CTH and EEG ordered as well as Neurology consult.      CTH on 2/3 with hypoattenuation along left anterior temporal lobe w/encephalomalacia, bilateral centrum semiovale, left anterior corpus callosum, right caudate, subinsular region which may indicate prior infarcts. Of these, findings in the temporal lobe are most substantial. EEG showed slowing but no seizures or epileptiform discharge. While on Abx, WBC continued to uptrend now at 31K from 29K and plan was for OR today for wound vac change, possible lymph node biopsy.   MRI brain w/o contrast completed and showed several scattered punctate acute infarcts throughout the bilateral frontal lobes, centrum semiovale, basal ganglia, corpus callosum, and cerebellar hemispheres concern for septic emboli in this setting as well as larger focal locular area of diffusion restriction within the left temporal lobe anteriorly which is prominently involving the subcortical white matter.             History reviewed. No pertinent past medical history.        Past Surgical History:   Procedure Laterality Date    INCISION AND DRAINAGE OF PERIRECTAL REGION N/A 1/29/2024     Procedure: INCISION AND DRAINAGE, PERIRECTAL REGION;  Surgeon: Axel Ramsay MD;  Location: [facility] OR;  Service: General;  Laterality: N/A;    PLACEMENT, TRIALYSIS CATH Right 1/31/2024     Procedure: INSERTION, CATHETER, TRIPLE LUMEN, HEMODIALYSIS, TEMPORARY;  Surgeon: Alvin Junior MD;  Location: [facility] OR;  Service: General;  Laterality: Right;    WOUND DEBRIDEMENT Bilateral 2/2/2024     Procedure: DEBRIDEMENT, WOUND;  Surgeon: Steve Cole MD;  Location: [facility] OR [address] [place] [address];  Service: General;  Laterality: Bilateral;  Bilateral groin  Possible wound vac placement    WOUND  "EXPLORATION Right 1/31/2024     Procedure: IRRIGATION & DEBRIDEMENT, WOUND DEBRIDEMENT;  Surgeon: Alvin Junior MD;  Location: [facility] OR;  Service: General;  Laterality: Right;         Review of patient's allergies indicates:  No Known Allergies     Medications: I have reviewed the current medication administration record.     No medications prior to admission.         Review of Systems   Unable to perform ROS: Intubated      Objective:      Vital Signs (Most Recent):  Temp: 97.4 °F (36.3 °C) (02/06/24 1215)  Pulse: 101 (02/06/24 1430)  Resp: 20 (02/06/24 1430)  BP: (!) 174/81 (02/06/24 1400)  SpO2: 100 % (02/06/24 1430)     Vital Signs Range (Last 24H):  Temp:  [97.3 °F (36.3 °C)-100.3 °F (37.9 °C)]   Pulse:  []   Resp:  [15-30]   BP: (132-197)/(62-94)   SpO2:  [95 %-100 %]   Arterial Line BP: (123-205)/(56-91)         Physical Exam  Constitutional:       Appearance: She is obese.      Comments: Opens eyes to tactile stimuli   Eyes:      Pupils: Pupils are equal, round, and reactive to light.   Cardiovascular:      Rate and Rhythm: Normal rate.   Pulmonary:      Effort: No respiratory distress.   Neurological:      Comments: Eyes open to pain   No spontaneous movement   W/d to pain all 4 extremities                   Laboratory:  CMP:       Recent Labs   Lab 02/06/24  1418   CALCIUM 8.2*   ALBUMIN 1.7*      K 4.3   CO2 19*      BUN 39*   CREATININE 4.1*      CBC:       Recent Labs   Lab 02/06/24  0339   WBC 32.17*   RBC 2.98*   HGB 7.9*   HCT 23.5*      MCV 79*   MCH 26.5*   MCHC 33.6      Lipid Panel: No results for input(s): "CHOL", "LDLCALC", "HDL", "TRIG" in the last 168 hours.  Coagulation:       Recent Labs   Lab 02/01/24  2246   INR 1.0   APTT 27.8      Hgb A1C: No results for input(s): "HGBA1C" in the last 168 hours.  TSH: No results for input(s): "TSH" in the last 168 hours.     Diagnostic Results:     Brain imaging/Vessel Imaging    CTA H&N - 02/06/2024    Impression:     Several " scattered punctate acute infarcts throughout the bilateral frontal lobes, centrum semiovale, basal ganglia, corpus callosum, and cerebellar hemispheres.  Overall distribution is concerning for embolic etiology (septic emboli to be considered in light of clinical history of infection).     Please note this is somewhat confounded by larger focal locular area of diffusion restriction within the left temporal lobe anteriorly which is prominently involving the subcortical white matter.  While this may be unusual possible venous additional area of acute infarction sequela of tumefactive demyelination a consideration with infectious process with encephalitis/abscess not excluded.     Clinical correlation and further evaluation with post-contrast MRI brain if patient compatible.        MRI Brain - 02/06/2024  Impression:     Several scattered punctate acute infarcts throughout the bilateral frontal lobes, centrum semiovale, basal ganglia, corpus callosum, and cerebellar hemispheres.  Overall distribution is concerning for embolic etiology (septic emboli to be considered in light of clinical history of infection).     Please note this is somewhat confounded by larger focal locular area of diffusion restriction within the left temporal lobe anteriorly which is prominently involving the subcortical white matter.  While this may be unusual possible venous additional area of acute infarction sequela of tumefactive demyelination a consideration with infectious process with encephalitis/abscess not excluded.     Clinical correlation and further evaluation with post-contrast MRI brain if patient compatible.    Cardiac Imaging   TTE 02/06/2024    Summary      Left Ventricle: The left ventricle is normal in size. Normal wall thickness. There is concentric remodeling. Normal wall motion. There is normal systolic function with a visually estimated ejection fraction of 60 - 65%. There is normal diastolic function. Normal left  ventricular filling pressure.    Right Ventricle: Normal right ventricular cavity size. Wall thickness is normal. Right ventricle wall motion  is normal. Systolic function is normal.    Aorta: Aortic root is normal in size measuring 2.79 cm. Ascending aorta is normal measuring 2.57 cm.    IVC/SVC: Normal venous pressure at 3 mmHg.

## 2024-02-07 NOTE — PROGRESS NOTES
02/06/24 2045   Vital Signs   Temp 99.5 °F (37.5 °C)   Temp Source Axillary   Pulse 94   Heart Rate Source Monitor   Resp 17   SpO2 100 %   Pulse Oximetry Type Continuous   Oxygen Concentration (%) 40   Device (Oxygen Therapy) ventilator   Art Line   Arterial Line /85   Arterial Line MAP (mmHg) 99 mmHg     Bedside HD 1:1 tx initiated aseptically via RIJ trialysis without issue.  UF goal of 1.5L as tolerated.  Pt is intubated.

## 2024-02-07 NOTE — ASSESSMENT & PLAN NOTE
Transfer from [facility]. Admitted for fourniers gangrene. Initiated HD on 1/31. ALVARADO 2/2 ATN in setting of septic shock. Arrived with CR 3.8. Unknown baseline.  Plan to OR today. Net -1.3L.OR today for debridement with possible closure and wound vac placement     ALVARADO most likley ATN in setting of septic shock     Plan/Recommendation  -No HD requirements today. Tolerated HD yesterday with no issues. Net UF 1.5 L.   -Will eval RRT needs daily   -Daily RFP   -Strict I&Os  -Avoid nephrotoxins, if possible

## 2024-02-07 NOTE — ASSESSMENT & PLAN NOTE
Neuro/Psych:   #Acute Encephalopathy  CTH 2/3 with scattered hypoattenuation likely representing age indeterminate infarcts. EEG findings consistent with moderate-severe encephalopathy.  -- Sedation: None  -- Pain: kermit tylenol, dialudid and oxy prn  -- GCS 7T: E2, V1T, M4; exam stable  -- Neurology following; appreciate recs  -- MRI 2/6: Several scattered punctate acute infarcts throughout the bilateral frontal lobes, centrum semiovale, basal ganglia, corpus callosum, and cerebellar hemispheres.  Overall distribution is concerning for embolic etiology   -- CTA 2/6: Atherosclerotic plaquing of the carotid bifurcations and proximal ICAs with less than 50% proximal ICA stenosis by NASCET criteria              Cards:   -- HDS  -- goal SBP < 180, MAP >65  -- hypertensive, hydralazine and labetalol prns  -- Continue amlodipine 10mg  -- ECHO 2/6: Unremarkable.   -- [facility] neuro recommend JAY to r/o vegetation.      Pulm:   #Acute Respiratory Failure  -- Intubated on spontaneous.  -- Goal O2 sat > 90%  -- CXR stable      Renal:  #ALVARADO on CKD  Received HD 2/6 without issue. ATN.   -- Nephrology following; appreciate recs  -- RRT as needed per nephrology  -- Stool output 70  -- Urine output: 50cc  Recent Labs   Lab 02/06/24  1418 02/06/24  2228 02/07/24  0308   BUN 39* 30* 31*  31*   CREATININE 4.1* 3.1* 3.4*  3.4*        FEN / GI:   -- Daily CMPs  -- NGT in place   -- Replace lytes as needed  -- Nutrition: NPO, TF (Novasource Renal) at goal 30 ml/hr   -- GI PPX   -- Nutrition following; appreciate recs  -- Continue metamucil       ID:    #Ayaz's gangrene  s/p OR debridement for nec fascitis. R groin tissue with proteus, sensitive to ceftriaxone. Growing bacteroids as well.   -- Abx: ceftriaxone and flagyl  -- ID following ; appreciate recs  -- Debridement and wound vac change 2/7  Recent Labs   Lab 02/05/24  0321 02/06/24  0339 02/07/24  0308   WBC 29.32* 32.17* 31.47*        Heme/Onc:  -- Daily CBC  -- Heparin DVT  ppx  Recent Labs   Lab 02/01/24  2246 02/02/24  0335 02/05/24  0321 02/06/24  0339 02/07/24  0308   HGB  --    < > 8.3* 7.9* 8.4*   PLT  --    < > 227 246 280   APTT 27.8  --   --   --   --    INR 1.0  --   --   --   --     < > = values in this interval not displayed.        Endo:   #IDDM  Initially presented to OSH with DKA with latest A1C 10.4 AG Gap resolved  - Increasing insulin prn requirements since starting tube feeds  - Placed on insulin gtt 2/3: Transition IV insulin infusion at 1.8 u/hr with stepdown parameters   - Increased Novolog to 10 units units q 4 hrs while on Tube feeds (HOLD if tube feeds are stopped or BG < 100)   - Moderate dose SSI  - Endocrine following, appreciate recs        PPx:   Feeding: NPO, TF @ 30  Analgesia/Sedation: See above  Thromboembolic prevention: SQH   HOB >30: Y  Stress Ulcer ppx: Famotidine  Glucose control: Critical care goal 140-180 g/dl, Insulin gtt, kermit novolog, SSI, Endo following  Bowel reg: N/A  Invasive Lines/Drains/Airway: Glynn, ETT, Art line, Trialysis, PIV x2, Rectal tube      Dispo/Code Status/Palliative:   -- SICU / Full Code

## 2024-02-07 NOTE — CARE UPDATE
I have reviewed the chart of Sarah Saravia and participated in the care of the patient who is hospitalized for the following:    Active Hospital Problems    Diagnosis    *Ayaz's gangrene    Ac isch multi vasc territories stroke    Leukocytosis    Encephalopathy    Encephalopathy, metabolic    Acute hypoxemic respiratory failure    Weaning from mechanically assisted ventilation initiated    Acute blood loss anemia    Acute renal failure with tubular necrosis    New onset type 2 diabetes mellitus    Metabolic acidosis    ALVARADO (acute kidney injury)    Hyponatremia    Diabetic acidosis without coma    Morbid (severe) obesity due to excess calories    Severe sepsis      I have reviewed Sarah Saravia with the multidisciplinary team during rounds.      Bree Lewis, POONAM  Unit-Based [facility]

## 2024-02-07 NOTE — ASSESSMENT & PLAN NOTE
BG goal 140-180    No previous hx of T2DM per family at bedside. A1c of 10.4. DKA at OSH.     BG trending down overnight and IV insulin infusion decreased per protocol.     Plan:  - Rewrite Transition IV insulin infusion at 0.8 u/hr with stepdown parameters (decreased per protocol)   -Decrease Novolog to 12 units q 4 hrs while on tube feeding (HOLD if tube feeding is stopped or BG < 100) 20% dose decrease  -Continue Moderate Dose Correction Scale  -BG monitoring q 4 hrs while NPO     ** Please call Endocrine for any BG related issues **      Discharge plans: TBD    Lab Results   Component Value Date    HGBA1C 10.4 (H) 01/30/2024

## 2024-02-07 NOTE — PROGRESS NOTES
[facility] - Surgical Intensive Care  Vascular Neurology  Comprehensive Stroke Center  Progress Note    Assessment/Plan:     Ac isch multi vasc territories stroke  53 F Hx obesity currently admitted to surgical ICU as transfer from  for necrotizing fascitis s/p surgical debirdement (1/30/24) and Vascular Neurology consulted for multi territory infarcts as well as abnormal restricted diffusion on L temporal lobe concerning for possible infarct/septic emboli. Patient is currently s/p surgical debridement of R groin nec fasc w + cx proteus as well as s/p wound vac. Gen Neuro originally consulted due to worsening neuro exam on 2/3 and CTH showed  hypoattenuations at left anterior temporal lobe w/encephalomalacia, and bilateral centrum semiovale, left anterior corpus callosum, right caudate, subinsular region. In the interim patient having worsening leukocytosis up to 31K today while on CTX and metronidazole with clear cultures. MRI brain 2/5/24 showed several scattered punctate acute infarcts at BL frontal lobes, centrum semiovale, basal ganglia, corpus callosum, and cerebellar hemispheres as well as focal locunar area of diffusion restriction at L temporal lobe.     Given patient's current clinical scenario with on going infection, worsening leukocytosis concerned for embolic etiology likely septic. TTE completed, unremarkable. Worsening neurological exam (disconjugate gaze, L>R). NIH 30. Stat CTH.     Recommendation:  -JAY to investigate for vegetation/septic emboli.  -LP to rule out vasculitis   -MRI/MRV W & WO to rule out venous infarct. Consider hypercoagulable studies based on results.     Antithrombotics for secondary stroke prevention: Antiplatelets: None: Risk of bleeding and need to rule out mycotic aneurysm     Statins for secondary stroke prevention and hyperlipidemia, if present:   Statins: Atorvastatin- 40 mg daily    Aggressive risk factor modification: HTN, HLD, Obesity, infection      Rehab efforts: The  patient has been evaluated by a stroke team provider and the therapy needs have been fully considered based off the presenting complaints and exam findings. The following therapy evaluations are needed:  when able     Diagnostics ordered/pending: CTA Head to assess vasculature , CTA Neck/Arch to assess vasculature, JAY.      VTE prophylaxis: None: Reason for No Pharmacological VTE Prophylaxis: Bacterial endocarditis concern     BP parameters: Infarct: normotension         New onset type 2 diabetes mellitus  Stroke RF   Endo following   - high risk of immunocompromise state     Morbid (severe) obesity due to excess calories  Stroke risk factors   BMI 56.58  Educate on diet and exercise for weight loss when appropriate          02/07/2024: Worsening neurological exam (disconjugate gaze, L>R). NIH 30. Stat CTH.     STROKE DOCUMENTATION        NIH Scale:  Interval: with any deterioration/worsening of Neurologic condition  1a. Level of Consciousness: 3-->Responds only with reflex motor or autonomic effects or totally unresponsive, flaccid, and areflexic  1b. LOC Questions: 2-->Answers neither question correctly  1c. LOC Commands: 2-->Performs neither task correctly  2. Best Gaze: 0-->Normal  3. Visual: 0-->No visual loss  4. Facial Palsy: 0-->Normal symmetrical movements  5a. Motor Arm, Left: 4-->No movement  5b. Motor Arm, Right: 4-->No movement  6a. Motor Leg, Left: 4-->No movement  6b. Motor Leg, Right: 4-->No movement  7. Limb Ataxia: 0-->Absent  8. Sensory: 2-->Severe to total sensory loss, patient is not aware of being touched in the face, arm, and leg  9. Best Language: 3-->Mute, global aphasia, no usable speech or auditory comprehension  10. Dysarthria: (UN) Intubated or other physical barrier  11. Extinction and Inattention (formerly Neglect): 2-->Profound lamar-inattention/extinction more than 1 modality  Total (NIH Stroke Scale): 30       Modified [place]    Cece Coma Scale:    ABCD2 Score:    [id number]  Score:   HAS -BLED Score:   ICH Score:   Hunt & Miles Classification:      Hemorrhagic change of an Ischemic Stroke: Does this patient have an ischemic stroke with hemorrhagic changes? No     Neurologic Chief Complaint: B/L frontal lobes scattered punctate infarcts     Subjective:     Interval History: Patient is seen for follow-up neurological assessment and treatment recommendations: Worsening neurological exam (disconjugate gaze, L>R). NIH 30. Stat CTH.     HPI, Past Medical, Family, and Social History remains the same as documented in the initial encounter.     Review of Systems   Unable to perform ROS: Intubated     Scheduled Meds:   acetaminophen  650 mg Per OG tube Q6H    amLODIPine  10 mg Per OG tube Daily    carvediloL  3.125 mg Per OG tube BID    cefTRIAXone (Rocephin) IV (PEDS and ADULTS)  2 g Intravenous Q24H    famotidine  20 mg Per OG tube Daily    heparin (porcine)  7,500 Units Subcutaneous Q8H    insulin aspart U-100  12 Units Subcutaneous Q4H    metronidazole  500 mg Intravenous Q8H    psyllium husk (aspartame)  1 packet Per OG tube BID     Continuous Infusions:   dextrose 10 % in water (D10W)      insulin regular 1 units/mL infusion orderable (TRANSFER) 0.8 Units/hr (02/07/24 1500)     PRN Meds:sodium chloride 0.9%, albuterol-ipratropium, dextrose 10 % in water (D10W), dextrose 10%, dextrose 10%, glucagon (human recombinant), glucose, glucose, hydrALAZINE, HYDROmorphone, insulin aspart U-100, labetalol, naloxone, ondansetron, oxyCODONE, oxyCODONE, prochlorperazine, sodium chloride 0.9%, sodium chloride 0.9%, sodium chloride 0.9%    Objective:     Vital Signs (Most Recent):  Temp: 98.2 °F (36.8 °C) (02/07/24 1215)  Pulse: 92 (02/07/24 1415)  Resp: 19 (02/07/24 1415)  BP: 127/62 (02/07/24 1400)  SpO2: 100 % (02/07/24 1415)  BP Location: Left forearm    Vital Signs Range (Last 24H):  Temp:  [98.2 °F (36.8 °C)-99.5 °F (37.5 °C)]   Pulse:  []   Resp:  [15-30]   BP: (114-157)/(57-95)   SpO2:  [98  "%-100 %]   Arterial Line BP: (114-177)/(54-89)   BP Location: Left forearm       Physical Exam  Constitutional:       Appearance: She is obese. She is ill-appearing.   HENT:      Mouth/Throat:      Comments: Intubated   Cardiovascular:      Rate and Rhythm: Normal rate.   Pulmonary:      Comments: Intubated   Neurological:      Cranial Nerves: Cranial nerve deficit present.      Comments: Intubated, not on sedation. Not following commands.               Neurological Exam:   LOC: comatose  Attention Span: poor  Language: Intubated  Orientation: Untestable due to intubation  Facial Movement (CN VII): Unable to test   Gag Reflex: present  Motor: Arm left  Plegia 0/5  Leg left  Plegia 0/5  Arm right  Plegia 0/5  Leg right Plegia 0/5    Laboratory:  CMP:   Recent Labs   Lab 02/07/24  0308   CALCIUM 8.4*  8.4*   ALBUMIN 1.8*  1.8*   PROT 7.3   *  133*   K 4.4  4.4   CO2 15*  15*     104   BUN 31*  31*   CREATININE 3.4*  3.4*   ALKPHOS 153*   ALT 13   AST 32   BILITOT 0.3     BMP:   Recent Labs   Lab 02/07/24  0308   *  133*   K 4.4  4.4     104   CO2 15*  15*   BUN 31*  31*   CREATININE 3.4*  3.4*   CALCIUM 8.4*  8.4*     CBC:   Recent Labs   Lab 02/07/24  0308   WBC 31.47*   RBC 3.17*   HGB 8.4*   HCT 25.7*      MCV 81*   MCH 26.5*   MCHC 32.7     Lipid Panel: No results for input(s): "CHOL", "LDLCALC", "HDL", "TRIG" in the last 168 hours.  Coagulation:   Recent Labs   Lab 02/01/24  2246   INR 1.0   APTT 27.8     Platelet Aggregation Study: No results for input(s): "PLTAGG", "PLTAGINTERP", "PLTAGREGLACO", "ADPPLTAGGREG" in the last 168 hours.  Hgb A1C: No results for input(s): "HGBA1C" in the last 168 hours.  TSH: No results for input(s): "TSH" in the last 168 hours.    Diagnostic Results     Brain Imaging/Vessel Imaging    CTA H&N - 02/06/2024       Consult Orders   Inpatient consult to Vascular (Stroke) Neurology [[id number]] ordered by Shala Stafford MD        "       Attestation signed by Zaid Longo MD at 2/7/2024 12:24 PM     I have seen the patient, reviewed the Resident's history and physical, assessment and plan, personally reviewed the imaging studies reported on the note, and agree with the findings.   I also performed the MDM (medical decision making) for this patient as detailed in the resident's note.     .Zaid Longo MD   Vascular Neurology  Comprehensive Stroke Center  [facility] - Surgical Intensive Care  Vascular Neurology  Comprehensive Stroke Center  Consult Note     Inpatient consult to Vascular (Stroke) Neurology  Consult performed by: Shala Stafford MD  Consult ordered by: Shala Stafford MD           Assessment/Plan:      Patient is a 53 y.o. year old female with:     Ac isch multi vasc territories stroke  53 F Hx obesity currently admitted to surgical ICU as transfer from  for necrotizing fascitis s/p surgical debirdement (1/30/24) and Vascular Neurology consulted for multi territory infarcts as well as abnormal restricted diffusion on L temporal lobe concerning for possible infarct/septic emboli. Patient is currently s/p surgical debridement of R groin nec fasc w + cx proteus as well as s/p wound vac. Gen Neuro originally consulted due to worsening neuro exam on 2/3 and CTH showed  hypoattenuations at left anterior temporal lobe w/encephalomalacia, and bilateral centrum semiovale, left anterior corpus callosum, right caudate, subinsular region. In the interim patient having worsening leukocytosis up to 31K today while on CTX and metronidazole with clear cultures. MRI brain 2/5/24 showed several scattered punctate acute infarcts at BL frontal lobes, centrum semiovale, basal ganglia, corpus callosum, and cerebellar hemispheres as well as focal locunar area of diffusion restriction at L temporal lobe.      Given patient's current clinical scenario with on going infection,  worsening leukocytosis concerned for embolic etiology likely septic and will need cardiac workup with TTE first to investigate for vegetation. Will also need vessel imaging to rule out mycotic aneurysm an to further investigate abnormal temporal findings, can not get gadolinium per radiology so recommending CTA h/n for now. Per radiology CNS infection/encephalitis possible, will discuss LP with neurology based on the above results.   Will need to hold on AC as if these are infectious higher risk of bleeding.         Antithrombotics for secondary stroke prevention: Antiplatelets: None: Risk of bleeding and need to rule out mycotic aneurysm      Statins for secondary stroke prevention and hyperlipidemia, if present:   Statins: Atorvastatin- 40 mg daily     Aggressive risk factor modification: HTN, HLD, Obesity, infection      Rehab efforts: The patient has been evaluated by a stroke team provider and the therapy needs have been fully considered based off the presenting complaints and exam findings. The following therapy evaluations are needed:  when able      Diagnostics ordered/pending: CTA Head to assess vasculature , CTA Neck/Arch to assess vasculature, TTE to assess cardiac function/status, depending on TTE will consider JAY.      VTE prophylaxis: None: Reason for No Pharmacological VTE Prophylaxis: Bacterial endocarditis concern      BP parameters: Infarct: normotension            New onset type 2 diabetes mellitus  Stroke RF   Endo following   - high risk of immunocompromise state            STROKE DOCUMENTATION         NIH Scale:  1a. Level of Consciousness: 2-->Not alert, requires repeated stimulation to attend, or is obtunded and requires strong or painful stimulation to make movements (not stereotyped)  1b. LOC Questions: 2-->Answers neither question correctly  1c. LOC Commands: 2-->Performs neither task correctly  2. Best Gaze: 0-->Normal  3. Visual: 0-->No visual loss  4. Facial Palsy: 0-->Normal  symmetrical movements  5a. Motor Arm, Left: 4-->No movement  5b. Motor Arm, Right: 4-->No movement  6a. Motor Leg, Left: 4-->No movement  6b. Motor Leg, Right: 4-->No movement  7. Limb Ataxia: 0-->Absent  8. Sensory: 1-->Mild-to-moderate sensory loss, patient feels pinprick is less sharp or is dull on the affected side, or there is a loss of superficial pain with pinprick, but patient is aware of being touched  9. Best Language: 3-->Mute, global aphasia, no usable speech or auditory comprehension  10. Dysarthria: (UN) Intubated or other physical barrier  11. Extinction and Inattention (formerly Neglect): 2-->Profound lamar-inattention/extinction more than 1 modality  Total (NIH Stroke Scale): 28     Modified [place]    Cece Coma Scale:    ABCD2 Score:    [id number] Score:   HAS -BLED Score:   ICH Score:   Hunt & Miles Classification:         Thrombolysis Candidate? No, Out of window - Symptom onset > 4.5 hours     Delays to Thrombolysis?  No     Interventional Revascularization Candidate?   Is the patient eligible for mechanical endovascular reperfusion (MAGDALENA)?  No; Large core infarct on imaging      Delays to Thrombectomy? No     Hemorrhagic change of an Ischemic Stroke: Does this patient have an ischemic stroke with hemorrhagic changes? No      Subjective:      History of Present Illness:  53 F Hx obesity currently admitted to surgical ICU as transfer from  for necrotizing fascitis s/p surgical debirdement (1/30/24) and Vascular Neurology consulted for multi territory infarcts as well as abnormal restricted diffusion on L temporal lobe concerning for possible infarct/septic emboli. Patient presented to OSH w/nausea and vomiting, right groin wound, found to in DKA. CT abdomen/pelvis w/extensive soft tissue air throughout the right groin and inguinal region extending to the RLQ, anterior abdominal wall, right proximal/medial thigh w/extensive soft tissue air concerning for gas-forming infection. S/p OR  debridement for necrotizing fasciitis on 1/29 and 1/31: cx growing proteus. Hospital course c/b respiratory failure requiring intubation, ALVARADO in setting of lactic acidosis and septic shock, presumably ATN, on SLED. Nephro/Endo and ID following and she was initially cefepime and meropenem and now on CTX and metronidazole. Per documentation on 2/2 patient was spontaneously moving her extremities but had neuro change on 2/3 for which CTH and EEG ordered as well as Neurology consult.      CTH on 2/3 with hypoattenuation along left anterior temporal lobe w/encephalomalacia, bilateral centrum semiovale, left anterior corpus callosum, right caudate, subinsular region which may indicate prior infarcts. Of these, findings in the temporal lobe are most substantial. EEG showed slowing but no seizures or epileptiform discharge. While on Abx, WBC continued to uptrend now at 31K from 29K and plan was for OR today for wound vac change, possible lymph node biopsy.   MRI brain w/o contrast completed and showed several scattered punctate acute infarcts throughout the bilateral frontal lobes, centrum semiovale, basal ganglia, corpus callosum, and cerebellar hemispheres concern for septic emboli in this setting as well as larger focal locular area of diffusion restriction within the left temporal lobe anteriorly which is prominently involving the subcortical white matter.             History reviewed. No pertinent past medical history.        Past Surgical History:   Procedure Laterality Date    INCISION AND DRAINAGE OF PERIRECTAL REGION N/A 1/29/2024     Procedure: INCISION AND DRAINAGE, PERIRECTAL REGION;  Surgeon: Axel Ramsay MD;  Location: [facility] OR;  Service: General;  Laterality: N/A;    PLACEMENT, TRIALYSIS CATH Right 1/31/2024     Procedure: INSERTION, CATHETER, TRIPLE LUMEN, HEMODIALYSIS, TEMPORARY;  Surgeon: Alvin Junior MD;  Location: [facility] OR;  Service: General;  Laterality: Right;    WOUND DEBRIDEMENT Bilateral  "2/2/2024     Procedure: DEBRIDEMENT, WOUND;  Surgeon: Steve Cole MD;  Location: [facility] OR 2ND FLR;  Service: General;  Laterality: Bilateral;  Bilateral groin  Possible wound vac placement    WOUND EXPLORATION Right 1/31/2024     Procedure: IRRIGATION & DEBRIDEMENT, WOUND DEBRIDEMENT;  Surgeon: Alvin Junior MD;  Location: [facility] OR;  Service: General;  Laterality: Right;         Review of patient's allergies indicates:  No Known Allergies     Medications: I have reviewed the current medication administration record.     No medications prior to admission.         Review of Systems   Unable to perform ROS: Intubated      Objective:      Vital Signs (Most Recent):  Temp: 97.4 °F (36.3 °C) (02/06/24 1215)  Pulse: 101 (02/06/24 1430)  Resp: 20 (02/06/24 1430)  BP: (!) 174/81 (02/06/24 1400)  SpO2: 100 % (02/06/24 1430)     Vital Signs Range (Last 24H):  Temp:  [97.3 °F (36.3 °C)-100.3 °F (37.9 °C)]   Pulse:  []   Resp:  [15-30]   BP: (132-197)/(62-94)   SpO2:  [95 %-100 %]   Arterial Line BP: (123-205)/(56-91)         Physical Exam  Constitutional:       Appearance: She is obese.      Comments: Opens eyes to tactile stimuli   Eyes:      Pupils: Pupils are equal, round, and reactive to light.   Cardiovascular:      Rate and Rhythm: Normal rate.   Pulmonary:      Effort: No respiratory distress.   Neurological:      Comments: Eyes open to pain   No spontaneous movement   W/d to pain all 4 extremities                   Laboratory:  CMP:       Recent Labs   Lab 02/06/24  1418   CALCIUM 8.2*   ALBUMIN 1.7*      K 4.3   CO2 19*      BUN 39*   CREATININE 4.1*      CBC:       Recent Labs   Lab 02/06/24  0339   WBC 32.17*   RBC 2.98*   HGB 7.9*   HCT 23.5*      MCV 79*   MCH 26.5*   MCHC 33.6      Lipid Panel: No results for input(s): "CHOL", "LDLCALC", "HDL", "TRIG" in the last 168 hours.  Coagulation:       Recent Labs   Lab 02/01/24  2246   INR 1.0   APTT 27.8      Hgb A1C: No results for input(s): " ""HGBA1C" in the last 168 hours.  TSH: No results for input(s): "TSH" in the last 168 hours.     Diagnostic Results:     Brain imaging/Vessel Imaging    CTA H&N - 02/06/2024    Impression:     Several scattered punctate acute infarcts throughout the bilateral frontal lobes, centrum semiovale, basal ganglia, corpus callosum, and cerebellar hemispheres.  Overall distribution is concerning for embolic etiology (septic emboli to be considered in light of clinical history of infection).     Please note this is somewhat confounded by larger focal locular area of diffusion restriction within the left temporal lobe anteriorly which is prominently involving the subcortical white matter.  While this may be unusual possible venous additional area of acute infarction sequela of tumefactive demyelination a consideration with infectious process with encephalitis/abscess not excluded.     Clinical correlation and further evaluation with post-contrast MRI brain if patient compatible.        MRI Brain - 02/06/2024  Impression:     Several scattered punctate acute infarcts throughout the bilateral frontal lobes, centrum semiovale, basal ganglia, corpus callosum, and cerebellar hemispheres.  Overall distribution is concerning for embolic etiology (septic emboli to be considered in light of clinical history of infection).     Please note this is somewhat confounded by larger focal locular area of diffusion restriction within the left temporal lobe anteriorly which is prominently involving the subcortical white matter.  While this may be unusual possible venous additional area of acute infarction sequela of tumefactive demyelination a consideration with infectious process with encephalitis/abscess not excluded.     Clinical correlation and further evaluation with post-contrast MRI brain if patient compatible.    Cardiac Imaging   TTE 02/06/2024    Summary      Left Ventricle: The left ventricle is normal in size. Normal wall thickness. " There is concentric remodeling. Normal wall motion. There is normal systolic function with a visually estimated ejection fraction of 60 - 65%. There is normal diastolic function. Normal left ventricular filling pressure.    Right Ventricle: Normal right ventricular cavity size. Wall thickness is normal. Right ventricle wall motion  is normal. Systolic function is normal.    Aorta: Aortic root is normal in size measuring 2.79 cm. Ascending aorta is normal measuring 2.57 cm.    IVC/SVC: Normal venous pressure at 3 mmHg.    Sandi Darling [facility] Stroke Center  Department of Vascular Neurology   [facility] - Surgical Intensive Care

## 2024-02-07 NOTE — PLAN OF CARE
VSS throughout shift. Patient withdraws to pain. NSR-sinus tachy. SBPs 120s-190s, controlled with PRN hydralazine and labetalol. Vented, FiO2 50% peep 8, remains on pressure support. TF @ goal, 30 cc/hr. Rectal tube with 100 cc output. Groin wound vac seal intact. Glynn catheter placed d/t urinary retention. Bladder scan volume 334. . Insulin gtt restarted @1. OR today for wound vac replacement and debridement. MRI and CTA obtained. Trialysis line replaced d/t poor flows overnight. Patient turned Q 2 hrs, foam dressings in place.  Patient and family members updated on plan of care, all questions and concerns addressed.

## 2024-02-07 NOTE — PROGRESS NOTES
[facility]  Infectious Disease  Progress Note    Patient Name: Sarah Saravia  MRN: [id number]  Admission Date: 1/29/2024  Length of Stay: 9 days  Attending Physician: Steve Cole MD  [facility]    Isolation Status: No active isolations  Assessment/Plan:      Neuro  Ac isch multi vasc territories stroke  Multiple acute embolic strokes noted on MRI. TTE without evidence of infective endocarditis. Blood cultures no growth.  Low suspicion for infective endocarditis however agree with work up detailed by vascular neurology.    Renal/  * Ayaz's gangrene  I have reviewed hospital notes from  SICU service and other specialty providers. I have also reviewed CBC, CMP/BMP,  cultures and imaging with my interpretation as documented.     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 Operative cultures showing P mirabilis. She is afebrile, and with ongoing leukocytosis. S/P wound VAC exchange on 2/6.  Continue ceftriaxone 2 gm IV daily.  Continue metronidazole.   Will follow up culture results.  Surgical debridements as indicated for non viable tissue as per Surgical Service.  Discussed management plan with the staff and/or members from SICU and General Surgery service.           Anticipated Disposition: per primary    Thank you for your consult. I will follow-up with patient. Please contact us if you have any additional questions.    Devika Andujar MD  Infectious Disease  [facility]    52 minutes of total time spent on the encounter, which includes face to face time and non-face to face time preparing to see the patient (eg, review of tests), obtaining and/or reviewing separately obtained history, documenting clinical information in the electronic or other health record, independently interpreting results (not separately reported) and communicating results to the patient/family/caregiver, or care coordination (not  separately reported).     Subjective:     Principal Problem:Ayaz's gangrene    HPI: A 53-year-old woman with morbid obesity as evidenced by a BMI of 50 3 (documented as 58 at [facility]) who originally presented to [facility] with a wound to her posterior thigh, vomiting, and diarrhea for 3-4 days prior to admission.  On evaluation in [facility] ED she was noted to be afebrile tachycardic, and hypertensive.  Laboratory workup at that time revealed leukocytosis, elevated creatinine, elevated lactic acid, and elevated CRP.  Additionally it showed hyperglycemia with anion gap acidosis consistent with diabetic ketoacidosis.  CT imaging of the abdomen showed extensive soft tissue air throughout the right groin and inguinal region extending to the right lower quadrant of the anterior abdominal wall and medial aspect of the right thigh concerning for gas-forming infection.  She was admitted to hospital medicine service and taken to the OR by General surgery for debridement.  Empiric antibiotics with Zosyn, clindamycin, and vancomycin was started.  She underwent incision and drainage with debridement of the soft tissues down to the muscular fascia on 01/29.  She was then subsequently taken back to the OR on 01/31 where she underwent irrigation and debridement of the wound and insertion of a triple-lumen temporary hemodialysis catheter.  She was subsequently transitioned to meropenem and clindamycin while in [facility].  Unfortunately her hospital course was complicated by acute respiratory failure requiring intubation with mechanical ventilation and worsening ALVARADO prompting initiation of renal replacement therapy.  She was transferred to [facility] for higher level of care.  Upon transfer the patient's antibiotic therapy was deescalated to ceftriaxone.    Infectious disease is consulted for Antibiotic management: currently on merem, concern for nec fasciitis          Interval History: ".  Ayaz's gangrene  complicated by need for mechanical ventilation and renal replacement therapy. Taken to OR on 2/2 for debridement of the right buttocks and groin (#3). Evaluated by Neurology on 2/3 for encephalopathy. CT head with remote infarcts. Not withdrawing to pain on the morning of 2/5.    MRI done on 2/6 with embolic infarcts. S/P wound VAC exchange on 2/6. MRI brain revealed multiple embolic infarcts.     Review of Systems   Unable to perform ROS: Intubated     Objective:     Vital Signs (Most Recent):  Temp: 98.2 °F (36.8 °C) (02/07/24 1215)  Pulse: 93 (02/07/24 1215)  Resp: 19 (02/07/24 1215)  BP: 134/63 (02/07/24 1200)  SpO2: 100 % (02/07/24 1215) Vital Signs (24h Range):  Temp:  [98.2 °F (36.8 °C)-99.5 °F (37.5 °C)] 98.2 °F (36.8 °C)  Pulse:  [] 93  Resp:  [2-30] 19  SpO2:  [97 %-100 %] 100 %  BP: (114-174)/(57-95) 134/63  Arterial Line BP: (114-177)/(54-89) 149/67     Weight: (!) 154.2 kg (340 lb)  Body mass index is 56.58 kg/m².    Estimated Creatinine Clearance: 29 mL/min (A) (based on SCr of 3.4 mg/dL (H)).     Physical Exam  Vitals and nursing note reviewed.   Constitutional:       Appearance: She is well-developed. She is ill-appearing.      Interventions: She is intubated.   HENT:      Head: Normocephalic and atraumatic.      Right Ear: External ear normal.      Left Ear: External ear normal.   Eyes:      General: No scleral icterus.        Right eye: No discharge.         Left eye: No discharge.      Conjunctiva/sclera: Conjunctivae normal.   Cardiovascular:      Rate and Rhythm: Normal rate and regular rhythm.      Heart sounds: Normal heart sounds. No murmur heard.     No friction rub. No gallop.   Pulmonary:      Effort: Pulmonary effort is normal. No respiratory distress. She is intubated.      Breath sounds: No stridor. Rhonchi present. No wheezing or rales.   Abdominal:      General: Bowel sounds are normal.   Musculoskeletal:         General: No tenderness.   Skin:     General: Skin is warm and dry.       Comments: Wound VAC on right groin area   Neurological:      Mental Status: She is lethargic.      Comments: Opens eyes to verbal and tactile stimuli.          Significant Labs: BMP:   Recent Labs   Lab 02/07/24  0308   *  142*   *  133*   K 4.4  4.4     104   CO2 15*  15*   BUN 31*  31*   CREATININE 3.4*  3.4*   CALCIUM 8.4*  8.4*   MG 2.2  2.2       CBC:   Recent Labs   Lab 02/06/24  0339 02/07/24 0308   WBC 32.17* 31.47*   HGB 7.9* 8.4*   HCT 23.5* 25.7*    280       Microbiology Results (last 7 days)       Procedure Component Value Units Date/Time    Culture, Anaerobe [[id number]]  (Abnormal) Collected: 01/30/24 0228    Order Status: Completed Specimen: Wound from Groin Updated: 02/07/24 0748     Anaerobic Culture No anaerobes isolated      BACTEROIDES SPECIES  Few      Narrative:      Right groin tissue    Culture, Anaerobe [[id number]] Collected: 01/30/24 0219    Order Status: Completed Specimen: Abscess from Groin Updated: 02/07/24 0748     Anaerobic Culture No anaerobes isolated    Narrative:      Right groin abcess    Blood culture x two cultures. Draw prior to antibiotics. [[id number]] Collected: 01/29/24 2118    Order Status: Completed Specimen: Blood from Peripheral, Antecubital, Left Updated: 02/02/24 2303     Blood Culture, Routine No Growth after 4 days.    Narrative:      Aerobic and anaerobic    Blood culture x two cultures. Draw prior to antibiotics. [[id number]] Collected: 01/29/24 1929    Order Status: Completed Specimen: Blood from Peripheral, Antecubital, Right Updated: 02/02/24 2103     Blood Culture, Routine No Growth after 4 days.    Narrative:      Aerobic and anaerobic    Aerobic culture [[id number]]  (Abnormal)  (Susceptibility) Collected: 01/30/24 0228    Order Status: Completed Specimen: Wound from Groin Updated: 02/02/24 0746     Aerobic Bacterial Culture PROTEUS MIRABILIS  Moderate      Narrative:      Right groin tissue    Aerobic culture  [[id number]]  (Abnormal)  (Susceptibility) Collected: 01/30/24 0219    Order Status: Completed Specimen: Abscess from Groin Updated: 02/02/24 0746     Aerobic Bacterial Culture PROTEUS MIRABILIS  Moderate      Narrative:      Right groin abcess    AFB Culture & Smear [[id number]] Collected: 01/30/24 0219    Order Status: Completed Specimen: Abscess from Groin Updated: 01/31/24 2127     AFB Culture & Smear Culture in progress     AFB CULTURE STAIN No acid fast bacilli seen.    Narrative:      Right groin abcess    AFB Culture & Smear [[id number]] Collected: 01/30/24 0228    Order Status: Completed Specimen: Wound from Groin Updated: 01/31/24 2127     AFB Culture & Smear Culture in progress     AFB CULTURE STAIN No acid fast bacilli seen.    Narrative:      Right groin tissue            Significant Imaging: I have reviewed all pertinent imaging results/findings within the past 24 hours.

## 2024-02-07 NOTE — ASSESSMENT & PLAN NOTE
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 Palliative and Supportive Care was consulted to explore goals of care.    Advance Care Planning   Goals of Care  - Code status: Full code  - Next of kin: two adult children   - Daughter [facility] [phone]   - Son is in [facility]  - ACP documents: none  - Patient does not have decision making capacity  - Prognosis: guarded  - Goals: life prolongation  - Plans: continue full supportive care, will follow along for family support during this very vulnerable time as prognosis is clarified    Goals of Care Conversation:  - 2/7/24: I met Ms. Saravia, intubated, not on sedation but not responsive or following commands, on spontaneous breathing trial. No family at bedside. I called her daughter [facility], who shared her understanding that her mother was newly found to have many mini-strokes in her brain, but believed that the doctors were looking for the source of the clot. We reviewed her mother's severe medical illnesses, including Ayaz's gangrene for which she received several debridements/wound vac exchanges, multiple IV antibiotics which she is still on, severe hyperglycemia, dependence on ventilator to protect her airway in setting of severe sepsis, acute renal failure now dependent on dialysis machine and encephalopathy for which the doctors discovered multiple mini-strokes. I shared that I learned that the team engaged the stroke physicians, who expressed concern that the pattern of stroke in the setting of severe  infection might be related to each other, and are searching for possible sources of infection hiding elsewhere in her body, including her heart. Aleks asked if possibly the sponge inside her mother's wound was the source of infection, and I shared that knowing that that the surgeons are still doing wound vac exchanges, that likely it isn't because they are treating her proactively to not only wipe away any infection that they can, but to also proactively treat her to minimize her risk of worsening infection. I admitted that I'm not a surgeon and cannot explain this as helpful as I wish I could. Aleks shared that thankfully the doctor on the surgical team has been communicating in understandable terms so feels assured that she is being informed thoroughly and trusts that she can ask these questions again in the future. She was appropriately tearful as I explained concern that her mother is critically ill and that her surgical team is aggressively treating her and thoroughly looking for any opportunities to help her get better. Aleks expressed gratefulness for this.  - I asked Aleks to share with me more about her mother. 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 She lives with her long-time partner of 26-28 years. Aleks is 31-years-old so recognized this man as her step-father and consider each other as family, though Ms. Saravia and her partner are not legally . Ms. Saravia also has a son, but he is in senior living. She has five grandchildren (through son), enjoys watching movies, going out to eat, and shopping all day. This sudden illness has been a huge shock and change in her life, and are hoping to help her return to her original life. Validated how terrifying and overwhelming this is, and reassured her that the doctors are working very aggressively in hopes to restore as much of her mother's health. Did warn that while the doctors promise to be  thoughtful, compassionate and thorough, it doesn't mean we can promise the results we pray and hope for. Aleks expressed understanding. Will continue to follow along for support as Ms. Saravia's trajectory unfolds.

## 2024-02-07 NOTE — PROGRESS NOTES
"[facility]  Adult Nutrition  Progress Note    SUMMARY       Recommendations    1.) TF recs: If pt is to remain off of CRRT and HD, continue current Tf regimen of Novasource Renal @30ml/hr to provide 1440 kcal, 65g PRO, and 516ml FF- FWF per MD (100% of EEN and 57% of EPN- as per ASPEN).     2.) If pt is to remain on HD tx, recommend changing formula to higher PRO formula like Impact Peptide 1.5 at goal rate of 40ml/hr to provide 1440 kcal, 90g PRO, and 739ml FF (100% of EEN anf 79% of EPN).      - consider Peptamen VHP if pt requires CRRT    3.) RD team to monitor tolerance, skin, labs, wt.       Goals: Meet % EEN/EPN by follow-up date.  Nutrition Goal Status: progressing towards goal  Communication of RD Recs:  (POC)    Assessment and Plan    Nutrition Problem  Inadequate oral intake     Related to (etiology):   Diagnosis related symptoms     Signs and Symptoms (as evidenced by):   Intubated  NPO     Interventions/Recommendations (treatment strategy):  Collaboration with medical providers     Nutrition Diagnosis Status:   Continues    Reason for Assessment    Reason For Assessment: RD follow-up  Diagnosis:  (ros's gangrene)  Relevant Medical History: Obesity >50.0  Interdisciplinary Rounds: did not attend    General Information Comments: RD f/u: Pt remains on vent, resting comfortably. Mild edema present. TF running at goal rate, with no tolerance issues as per RN. Pt continues to receive HD or CRRT, per neph. If pt is receiving either, PRO needs will be higher.    Nutrition Discharge Planning: pending medical course    Nutrition Risk Screen    Nutrition Risk Screen: large or nonhealing wound, burn or pressure injury    Nutrition/Diet History    Food Allergies: NKFA    Anthropometrics    Temp: 98.2 °F (36.8 °C)  Height Method: Stated  Height: 5' 5" (165.1 cm)  Height (inches): 65 in  Weight Method: Bed Scale  Weight: (!) 154.2 kg (340 lb)  Weight (lb): (!) 340 lb  Ideal Body " Weight (IBW), Female: 125 lb  % Ideal Body Weight, Female (lb): 285.6 %  BMI (Calculated): 56.6  BMI Grade: greater than 40 - morbid obesity    Lab/Procedures/Meds    Pertinent Labs Reviewed: reviewed  Pertinent Labs Comments: Na: 133, BUN: 31, cr: 3.4, GFR: 15.5, gluc: 142, Ca: 8.4, phos: 5.2, alb: 1.8    Pertinent Medications Reviewed: reviewed  Pertinent Medications Comments: Abx, famotidine, heparin, insulin, psyllium husk    Estimated/Assessed Needs    Weight Used For Calorie Calculations: 57 kg (125 lb 10.6 oz) (IBW d/t BMI >50.0)  Energy Calorie Requirements (kcal): 1425 kcal  Energy Need Method: Kcal/kg (25 kcal/kg of IBW d/t BMI >50.0)    Protein Requirements: 114- 143g (2.0-2.5g/kg)  Weight Used For Protein Calculations: 57 kg (125 lb 10.6 oz) (IBW d/t BMI >50.0)    Estimated Fluid Requirement Method: RDA Method  RDA Method (mL): 1425    Nutrition Prescription Ordered    Current Diet Order: NPO  Current Nutrition Support Formula Ordered: [email or web address] Renal  Current Nutrition Support Rate Ordered: 30 (ml)    Evaluation of Received Nutrient/Fluid Intake    Enteral Calories (kcal): 1440  Enteral Protein (gm): 65  Enteral (Free Water) Fluid (mL): 516  I/O: -947ml since admit  Energy Calories Required: meeting needs  Protein Required: not meeting needs  Fluid Required:  (as per MD)  Comments: LBM 2/6  % Intake of Estimated Energy Needs: 75 - 100 %  % Meal Intake: NPO    Nutrition Risk    Level of Risk/Frequency of Follow-up:  (RD to f/u x 1-2/week)     Monitor and Evaluation    Food and Nutrient Intake: enteral nutrition intake, parenteral nutrition intake  Food and Nutrient Adminstration: enteral and parenteral nutrition administration  Knowledge/Beliefs/Attitudes: food and nutrition knowledge/skill, beliefs and attitudes  Physical Activity and Function: nutrition-related ADLs and IADLs, factors affecting access to physical activity  Anthropometric Measurements: weight, weight change, body mass  index  Biochemical Data, Medical Tests and Procedures: electrolyte and renal panel  Nutrition-Focused Physical Findings: overall appearance     Nutrition Follow-Up    RD Follow-up?: Yes

## 2024-02-07 NOTE — PROGRESS NOTES
"[facility]  Endocrinology  Progress Note    Admit Date: 1/29/2024     Reason for Consult: Management of T2DM, Hyperglycemia     Surgical Procedure and Date: n/a    Diabetes diagnosis year: new onset     Home Diabetes Medications:  none per chart review and family present at bedside     Lab Results   Component Value Date    HGBA1C 10.4 (H) 01/30/2024       Diabetes Complications include:     Hyperglycemia, DKA at OSH     Complicating diabetes co morbidities:   Obesity       HPI:   Patient is a 53 y.o. female with a diagnosis of obesity (BMI 53) admitted with N/V and R groin wound found to be in DKA at OSH. 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 While at OSH pt developed acute respiratory failure requiring intubation. Pulmonology was consulted for ventilator management and critical illness. Nephrology was consulted for worsening vishal in the setting of lactic acidosis and septic shock likely ATN, sCr elevated at 3.8 on admit now 4.0 post HD. Pt being admitted to SICU for surgical critical care management. Immediate plans include hemodynamic stabilization, weaning of respiratory support, and RRT.  Endocrinology consulted for management of T2DM.                 Interval HPI:   Overnight events: Remains intubated in SICU. POD 1. BG within goal ranges with IV insulin infusion at 0.8 u/hr and scheduled Novolog. Tube feeds infusing. Creatinine 3.4. Diet NPO    Eating:   NPO  Nausea: No  Hypoglycemia and intervention: No  Fever: No  TPN and/or TF: Yes  If yes, type of TF/TPN and rate: TFs at 30 ml/hr     BP (!) 157/74 (BP Location: Left forearm, Patient Position: Lying)   Pulse 98   Temp 98.3 °F (36.8 °C) (Oral)   Resp 16   Ht 5' 5" (1.651 m)   Wt (!) 154.2 kg (340 lb)   LMP 01/01/2024 (Approximate) Comment: tubal ligation  SpO2 100%   BMI 56.58 kg/m²     Labs Reviewed and Include    Recent Labs " "  Lab 02/07/24  0308   *  142*   CALCIUM 8.4*  8.4*   ALBUMIN 1.8*  1.8*   PROT 7.3   *  133*   K 4.4  4.4   CO2 15*  15*     104   BUN 31*  31*   CREATININE 3.4*  3.4*   ALKPHOS 153*   ALT 13   AST 32   BILITOT 0.3     Lab Results   Component Value Date    WBC 31.47 (H) 02/07/2024    HGB 8.4 (L) 02/07/2024    HCT 25.7 (L) 02/07/2024    MCV 81 (L) 02/07/2024     02/07/2024     No results for input(s): "TSH", "FREET4" in the last 168 hours.  Lab Results   Component Value Date    HGBA1C 10.4 (H) 01/30/2024       Nutritional status:   Body mass index is 56.58 kg/m².  Lab Results   Component Value Date    ALBUMIN 1.8 (L) 02/07/2024    ALBUMIN 1.8 (L) 02/07/2024    ALBUMIN 1.8 (L) 02/06/2024     No results found for: "PREALBUMIN"    Estimated Creatinine Clearance: 29 mL/min (A) (based on SCr of 3.4 mg/dL (H)).    Accu-Checks  Recent Labs     02/06/24  0032 02/06/24  0411 02/06/24  0527 02/06/24  0736 02/06/24  1234 02/06/24  1621 02/06/24  2022 02/07/24  0008 02/07/24  0306 02/07/24  0836   POCTGLUCOSE 136* 97 112* 145* 151* 167* 174* 144* 137* 154*       Current Medications and/or Treatments Impacting Glycemic Control  Immunotherapy:    Immunosuppressants       None          Steroids:   Hormones (From admission, onward)      None          Pressors:    Autonomic Drugs (From admission, onward)      None          Hyperglycemia/Diabetes Medications:   Antihyperglycemics (From admission, onward)      Start     Stop Route Frequency Ordered    02/07/24 0845  insulin aspart U-100 pen 12 Units         -- SubQ Every 4 hours 02/07/24 0838    02/07/24 0830  insulin regular in 0.9 % NaCl 100 unit/100 mL (1 unit/mL) infusion        Question:  Enter initial dose (Units/hr):  Answer:  0.8    -- IV Continuous 02/07/24 0828    02/03/24 1010  insulin aspart U-100 pen 0-10 Units         -- SubQ Every 4 hours PRN 02/03/24 0911            ASSESSMENT and PLAN    Renal/  * Ayaz's gangrene  Managed per " primary team  Optimize BG control        ALVARADO (acute kidney injury)  Lab Results   Component Value Date    CREATININE 3.4 (H) 02/07/2024    CREATININE 3.4 (H) 02/07/2024     Avoid insulin stacking  Titrate insulin slowly       Endocrine  Morbid (severe) obesity due to excess calories  Body mass index is 56.58 kg/m².  May increase insulin resistance.         New onset type 2 diabetes mellitus  BG goal 140-180    No previous hx of T2DM per family at bedside. A1c of 10.4. DKA at OSH.     BG trending down overnight and IV insulin infusion decreased per protocol.     Plan:  - Rewrite Transition IV insulin infusion at 0.8 u/hr with stepdown parameters (decreased per protocol)   -Decrease Novolog to 12 units q 4 hrs while on tube feeding (HOLD if tube feeding is stopped or BG < 100) 20% dose decrease  -Continue Moderate Dose Correction Scale  -BG monitoring q 4 hrs while NPO     ** Please call Endocrine for any BG related issues **      Discharge plans: TBD    Lab Results   Component Value Date    HGBA1C 10.4 (H) 01/30/2024             Zoie Muñiz NP  Endocrinology  [facility]

## 2024-02-07 NOTE — CONSULTS
[facility] - Surgical Intensive Care  Palliative Medicine  Consult Note    Patient Name: Sarah Saravia  MRN: [id number]  Admission Date: 1/29/2024  Hospital Length of Stay: 9 days  Code Status: Full Code   Attending Provider: Steve Cole MD  Consulting Provider: Estee Guzman MD  Primary Care Physician: [facility]  Principal Problem:Ayaz's gangrene    Patient information was obtained from relative(s) and primary team.      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 Palliative and Supportive Care was consulted to explore goals of care.    Advance Care Planning  Goals of Care  - Code status: Full code  - Next of kin: two adult children   - Daughter [facility] [phone]   - Son is in senior care  - ACP documents: none  - Patient does not have decision making capacity  - Prognosis: guarded  - Goals: life prolongation  - Plans: continue full supportive care, will follow along for family support during this very vulnerable time as prognosis is clarified    Goals of Care Conversation:  - 2/7/24: I met Ms. Saravia, intubated, not on sedation but not responsive or following commands, on spontaneous breathing trial. No family at bedside. I called her daughter [facility], who shared her understanding that her  mother was newly found to have many mini-strokes in her brain, but believed that the doctors were looking for the source of the clot. We reviewed her mother's severe medical illnesses, including Ayaz's gangrene for which she received several debridements/wound vac exchanges, multiple IV antibiotics which she is still on, severe hyperglycemia, dependence on ventilator to protect her airway in setting of severe sepsis, acute renal failure now dependent on dialysis machine and encephalopathy for which the doctors discovered multiple mini-strokes. I shared that I learned that the team engaged the stroke physicians, who expressed concern that the pattern of stroke in the setting of severe infection might be related to each other, and are searching for possible sources of infection hiding elsewhere in her body, including her heart. Aleks asked if possibly the sponge inside her mother's wound was the source of infection, and I shared that knowing that that the surgeons are still doing wound vac exchanges, that likely it isn't because they are treating her proactively to not only wipe away any infection that they can, but to also proactively treat her to minimize her risk of worsening infection. I admitted that I'm not a surgeon and cannot explain this as helpful as I wish I could. Aleks shared that thankfully the doctor on the surgical team has been communicating in understandable terms so feels assured that she is being informed thoroughly and trusts that she can ask these questions again in the future. She was appropriately tearful as I explained concern that her mother is critically ill and that her surgical team is aggressively treating her and thoroughly looking for any opportunities to help her get better. Aleks expressed gratefulness for this.  - I asked Aleks to share with me more about her mother. 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 She lives  with her long-time partner of 26-28 years. Aleks is 31-years-old so recognized this man as her step-father and consider each other as family, though Ms. Saravia and her partner are not legally . Ms. Saravia also has a son, but he is in halfway. She has five grandchildren (through son), enjoys watching movies, going out to eat, and shopping all day. This sudden illness has been a huge shock and change in her life, and are hoping to help her return to her original life. Validated how terrifying and overwhelming this is, and reassured her that the doctors are working very aggressively in hopes to restore as much of her mother's health. Did warn that while the doctors promise to be thoughtful, compassionate and thorough, it doesn't mean we can promise the results we pray and hope for. Aleks expressed understanding. Will continue to follow along for support as Ms. Saravia's trajectory unfolds.             Thank you for your consult. I will follow-up with patient. Please contact us if you have any additional questions.    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 Palliative and Supportive Care was consulted to explore goals of care.    Hospital Course:  No notes on file    Interval History: Intubated, not sedated and not following commands.    History reviewed. No pertinent past medical history.    Past Surgical History:   Procedure Laterality Date    INCISION AND DRAINAGE OF PERIRECTAL REGION N/A 1/29/2024    Procedure: INCISION AND DRAINAGE, PERIRECTAL REGION;  Surgeon: Axel Ramsay  MD FLORENCIO;  Location: [facility] OR;  Service: General;  Laterality: N/A;    PLACEMENT, TRIALYSIS CATH Right 1/31/2024    Procedure: INSERTION, CATHETER, TRIPLE LUMEN, HEMODIALYSIS, TEMPORARY;  Surgeon: Alvin Junior MD;  Location: [facility] OR;  Service: General;  Laterality: Right;    WOUND DEBRIDEMENT Bilateral 2/2/2024    Procedure: DEBRIDEMENT, WOUND;  Surgeon: Steve Cole MD;  Location: [facility] OR 2ND FLR;  Service: General;  Laterality: Bilateral;  Bilateral groin  Possible wound vac placement    WOUND DEBRIDEMENT Right 2/6/2024    Procedure: DEBRIDEMENT, WOUND, replace wound vac, possible closure;  Surgeon: Steve Cole MD;  Location: [facility] OR 2ND FLR;  Service: General;  Laterality: Right;  RLE    WOUND EXPLORATION Right 1/31/2024    Procedure: IRRIGATION & DEBRIDEMENT, WOUND DEBRIDEMENT;  Surgeon: Alvin Junior MD;  Location: [facility] OR;  Service: General;  Laterality: Right;       Review of patient's allergies indicates:  No Known Allergies    Medications:  Continuous Infusions:   dextrose 10 % in water (D10W)      insulin regular 1 units/mL infusion orderable (TRANSFER) 0.8 Units/hr (02/07/24 1000)     Scheduled Meds:   acetaminophen  650 mg Per OG tube Q6H    amLODIPine  10 mg Per OG tube Daily    carvediloL  3.125 mg Per OG tube BID    cefTRIAXone (Rocephin) IV (PEDS and ADULTS)  2 g Intravenous Q24H    famotidine  20 mg Per OG tube Daily    heparin (porcine)  7,500 Units Subcutaneous Q8H    insulin aspart U-100  12 Units Subcutaneous Q4H    metronidazole  500 mg Intravenous Q8H    psyllium husk (aspartame)  1 packet Per OG tube BID     PRN Meds:sodium chloride 0.9%, albuterol-ipratropium, dextrose 10 % in water (D10W), dextrose 10%, dextrose 10%, glucagon (human recombinant), glucose, glucose, hydrALAZINE, HYDROmorphone, insulin aspart U-100, labetalol, magnesium sulfate IVPB, naloxone, ondansetron, oxyCODONE, oxyCODONE, prochlorperazine, sodium chloride 0.9%, sodium chloride 0.9%, sodium chloride 0.9%, sodium  phosphate 20.01 mmol in dextrose 5 % (D5W) 250 mL IVPB, sodium phosphate 30 mmol in dextrose 5 % (D5W) 250 mL IVPB, sodium phosphate 39.99 mmol in dextrose 5 % (D5W) 250 mL IVPB    Family History    None       Tobacco Use    Smoking status: Not on file    Smokeless tobacco: Not on file   Substance and Sexual Activity    Alcohol use: Not on file    Drug use: Not on file    Sexual activity: Not on file       Review of Systems   Unable to perform ROS: Mental status change     Objective:     Vital Signs (Most Recent):  Temp: 98.3 °F (36.8 °C) (02/07/24 0723)  Pulse: 100 (02/07/24 1000)  Resp: 16 (02/07/24 1000)  BP: (!) 143/72 (02/07/24 1000)  SpO2: 100 % (02/07/24 1000) Vital Signs (24h Range):  Temp:  [97.3 °F (36.3 °C)-99.5 °F (37.5 °C)] 98.3 °F (36.8 °C)  Pulse:  [] 100  Resp:  [2-30] 16  SpO2:  [95 %-100 %] 100 %  BP: (114-197)/(57-95) 143/72  Arterial Line BP: (114-205)/(54-91) 154/69     Weight: (!) 154.2 kg (340 lb)  Body mass index is 56.58 kg/m².       Physical Exam  Constitutional:       Appearance: She is obese.   HENT:      Head: Normocephalic and atraumatic.      Right Ear: External ear normal.      Left Ear: External ear normal.      Nose: Nose normal.      Mouth/Throat:      Mouth: Mucous membranes are dry.   Eyes:      Conjunctiva/sclera: Conjunctivae normal.   Cardiovascular:      Rate and Rhythm: Normal rate.   Pulmonary:      Breath sounds: Normal breath sounds.      Comments: On spontaneous breathing trial, ETT connected to ventilator  Abdominal:      General: Bowel sounds are normal.      Palpations: Abdomen is soft.   Musculoskeletal:         General: Swelling present.   Lymphadenopathy:      Cervical: No cervical adenopathy.   Skin:     General: Skin is warm and dry.   Neurological:      Comments: Not following commands despite no sedation            Review of Symptoms      Symptom Assessment (ESAS 0-10 Scale)  Unable to complete assessment due to Mental status change         Pain  Assessment in Advanced Demential Scale (PAINAD)   Breathing - Independent of vocalization:  0  Negative vocalization:  0  Facial expression:  0  Body language:  0  Consolability:  0  Total:  0    Living Arrangements:  Lives with spouse    Psychosocial/Cultural:   See Palliative Psychosocial Note: No  Lives with long-time partner - not legally , together for 26 to 28 years. Has adult daughter (age 31) and adult son, who is in alf. Has five grandchildren. Enjoyed watching movies, going out to eat, shopping. Worked as a patient caregiver for blind patients.  **Primary  to Follow**  Palliative Care  Consult: No    Spiritual:  F - Belen and Belief:  Believes in God  I - Importance:  Important  C - Community:  Does not attend Latter-day  A - Address in Care:  Engage  support        Advance Care Planning  Advance Directives:   Living Will: No    LaPOST: No    Do Not Resuscitate Status: No    Medical Power of : No      Decision Making:  Family answered questions  Goals of Care: The family endorses that what is most important right now is to focus on curative/life-prolongation (regardless of treatment burdens)    Accordingly, we have decided that the best plan to meet the patient's goals includes continuing with treatment         Significant Labs: CBC:   Recent Labs   Lab 02/06/24  0339 02/07/24  0308   WBC 32.17* 31.47*   HGB 7.9* 8.4*   HCT 23.5* 25.7*    280     CMP:   Recent Labs   Lab 02/06/24  1418 02/06/24  2228 02/07/24  0308    132* 133*  133*   K 4.3 4.4 4.4  4.4    106 104  104   CO2 19* 18* 15*  15*   * 155* 142*  142*   BUN 39* 30* 31*  31*   CREATININE 4.1* 3.1* 3.4*  3.4*   CALCIUM 8.2* 8.5* 8.4*  8.4*   PROT  --   --  7.3   ALBUMIN 1.7* 1.8* 1.8*  1.8*   BILITOT  --   --  0.3   ALKPHOS  --   --  153*   AST  --   --  32   ALT  --   --  13   ANIONGAP 10 8 14  14     CBC:   Recent Labs   Lab 02/07/24  0308   WBC 31.47*   HGB 8.4*    HCT 25.7*   MCV 81*        BMP:  Recent Labs   Lab 02/07/24  0308   *  142*   *  133*   K 4.4  4.4     104   CO2 15*  15*   BUN 31*  31*   CREATININE 3.4*  3.4*   CALCIUM 8.4*  8.4*   MG 2.2  2.2     LFT:  Lab Results   Component Value Date    AST 32 02/07/2024    ALKPHOS 153 (H) 02/07/2024    BILITOT 0.3 02/07/2024     Albumin:   Albumin   Date Value Ref Range Status   02/07/2024 1.8 (L) 3.5 - 5.2 g/dL Final   02/07/2024 1.8 (L) 3.5 - 5.2 g/dL Final     Protein:   Total Protein   Date Value Ref Range Status   02/07/2024 7.3 6.0 - 8.4 g/dL Final     Lactic acid:   Lab Results   Component Value Date    LACTATE 1.5 02/01/2024    LACTATE 2.4 (H) 01/31/2024       Significant Imaging: I have reviewed all pertinent imaging results/findings within the past 24 hours.      I spent a total of 75 minutes on the day of the visit. This includes face to face time in discussion of goals of care, symptom assessment, coordination of care and emotional support.  This also includes non-face to face time preparing to see the patient (eg, review of tests/imaging), obtaining and/or reviewing separately obtained history, documenting clinical information in the electronic or other health record, independently interpreting results and communicating results to the patient/family/caregiver, or care coordinator.    Estee Guzman MD  Palliative Medicine  [facility] - Surgical Intensive Care

## 2024-02-07 NOTE — PLAN OF CARE
Recommendations     1.) TF recs: If pt is to remain off of CRRT and HD, continue current Tf regimen of Novasource Renal @30ml/hr to provide 1440 kcal, 65g PRO, and 516ml FF- FWF per MD (100% of EEN and 57% of EPN- as per ASPEN).      2.) If pt is to remain on HD tx, recommend changing formula to higher PRO formula like Impact Peptide 1.5 at goal rate of 40ml/hr to provide 1440 kcal, 90g PRO, and 739ml FF (100% of EEN anf 79% of EPN).                  - consider Peptamen VHP if pt requires CRRT     3.) RD team to monitor tolerance, skin, labs, wt.         Goals: Meet % EEN/EPN by follow-up date.  Nutrition Goal Status: progressing towards goal  Communication of RD Recs:  (POC)

## 2024-02-07 NOTE — ANESTHESIA POSTPROCEDURE EVALUATION
Anesthesia Post Evaluation    Patient: Sarah Saravia    Procedure(s) Performed: Procedure(s) (LRB):  DEBRIDEMENT, WOUND, replace wound vac, possible closure (Right)    Final Anesthesia Type: general      Patient location during evaluation: ICU  Patient participation: No - Unable to Participate, Coma/Other Inability to Communicate  Level of consciousness: obtunded/minimal responses  Post-procedure vital signs: reviewed and stable  Pain management: adequate  Airway patency: patent    PONV status at discharge: No PONV  Anesthetic complications: no      Cardiovascular status: blood pressure returned to baseline  Respiratory status: ventilator and ETT  Hydration status: euvolemic  Follow-up not needed.          Vitals Value Taken Time   /71 02/07/24 1502   Temp 36.8 °C (98.2 °F) 02/07/24 1215   Pulse 94 02/07/24 1538   Resp 20 02/07/24 1538   SpO2 98 % 02/07/24 1538   Vitals shown include unvalidated device data.      No case tracking events are documented in the log.      Pain/Lucía Score: Pain Rating Prior to Med Admin: 0 (2/7/2024 12:23 PM)  Pain Rating Post Med Admin: 0 (2/7/2024  1:23 PM)

## 2024-02-07 NOTE — PLAN OF CARE
SICU PLAN OF CARE NOTE    Dx: Ayaz's gangrene    Goals of Care: MAP > 65, SBP < 170, Monitor neurological status    Vital Signs (last 12 hours):   Temp:  [98.1 °F (36.7 °C)-98.3 °F (36.8 °C)]   Pulse:  []   Resp:  [15-27]   BP: (114-173)/(57-82)   SpO2:  [98 %-100 %]   Arterial Line BP: (120-177)/(59-89)      Neuro: Arouses to Voice    Cardiac: NSR    Respiratory: Ventilator on Spontaneous setting, FiO2 40%, PEEP 5    Gtts: Insulin    Urine Output: Urinary Catheter 60 cc/shift    Dialysis: Plans for possible iHD tomorrow    Drains: Wound Vac, total output 100 / shift    Diet: Tube Feeds @ 30 mL./hr (Goal 30 mL/hr.)     Labs/Accuchecks: Daily Labs / Accuchecks Q4H    Skin:  No signs of skin breakdown or redness noted to bony prominences. Sacral foam and heel boots in place. Patient repositioned Q2H. Immerse specialty bed plugged in and working correctly.    Shift Events:    CT Head ordered for   Palliative care team consulted.  Electrolytes replaced per orders. PRN Hydralazine given x 1 for hypertension    Plan of care reviewed with patient's daughter and all questions answered at this time.

## 2024-02-07 NOTE — HPI
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 Palliative and Supportive Care was consulted to explore goals of care.

## 2024-02-07 NOTE — ASSESSMENT & PLAN NOTE
Lab Results   Component Value Date    CREATININE 3.4 (H) 02/07/2024    CREATININE 3.4 (H) 02/07/2024     Avoid insulin stacking  Titrate insulin slowly

## 2024-02-07 NOTE — ASSESSMENT & PLAN NOTE
Multiple acute embolic strokes noted on MRI. TTE without evidence of infective endocarditis. Blood cultures no growth.  Low suspicion for infective endocarditis however agree with work up detailed by vascular neurology.

## 2024-02-07 NOTE — SUBJECTIVE & OBJECTIVE
Interval History: Intubated, not sedated and not following commands.    History reviewed. No pertinent past medical history.    Past Surgical History:   Procedure Laterality Date    INCISION AND DRAINAGE OF PERIRECTAL REGION N/A 1/29/2024    Procedure: INCISION AND DRAINAGE, PERIRECTAL REGION;  Surgeon: Axel Ramsay MD;  Location: [facility] OR;  Service: General;  Laterality: N/A;    PLACEMENT, TRIALYSIS CATH Right 1/31/2024    Procedure: INSERTION, CATHETER, TRIPLE LUMEN, HEMODIALYSIS, TEMPORARY;  Surgeon: Alvin Junior MD;  Location: [facility] OR;  Service: General;  Laterality: Right;    WOUND DEBRIDEMENT Bilateral 2/2/2024    Procedure: DEBRIDEMENT, WOUND;  Surgeon: Steve Cole MD;  Location: [facility] OR 2ND FLR;  Service: General;  Laterality: Bilateral;  Bilateral groin  Possible wound vac placement    WOUND DEBRIDEMENT Right 2/6/2024    Procedure: DEBRIDEMENT, WOUND, replace wound vac, possible closure;  Surgeon: Steve Cole MD;  Location: [facility] OR 2ND FLR;  Service: General;  Laterality: Right;  RLE    WOUND EXPLORATION Right 1/31/2024    Procedure: IRRIGATION & DEBRIDEMENT, WOUND DEBRIDEMENT;  Surgeon: Alvin Junior MD;  Location: [facility] OR;  Service: General;  Laterality: Right;       Review of patient's allergies indicates:  No Known Allergies    Medications:  Continuous Infusions:   dextrose 10 % in water (D10W)      insulin regular 1 units/mL infusion orderable (TRANSFER) 0.8 Units/hr (02/07/24 1000)     Scheduled Meds:   acetaminophen  650 mg Per OG tube Q6H    amLODIPine  10 mg Per OG tube Daily    carvediloL  3.125 mg Per OG tube BID    cefTRIAXone (Rocephin) IV (PEDS and ADULTS)  2 g Intravenous Q24H    famotidine  20 mg Per OG tube Daily    heparin (porcine)  7,500 Units Subcutaneous Q8H    insulin aspart U-100  12 Units Subcutaneous Q4H    metronidazole  500 mg Intravenous Q8H    psyllium husk (aspartame)  1 packet Per OG tube BID     PRN Meds:sodium chloride 0.9%, albuterol-ipratropium, dextrose  10 % in water (D10W), dextrose 10%, dextrose 10%, glucagon (human recombinant), glucose, glucose, hydrALAZINE, HYDROmorphone, insulin aspart U-100, labetalol, magnesium sulfate IVPB, naloxone, ondansetron, oxyCODONE, oxyCODONE, prochlorperazine, sodium chloride 0.9%, sodium chloride 0.9%, sodium chloride 0.9%, sodium phosphate 20.01 mmol in dextrose 5 % (D5W) 250 mL IVPB, sodium phosphate 30 mmol in dextrose 5 % (D5W) 250 mL IVPB, sodium phosphate 39.99 mmol in dextrose 5 % (D5W) 250 mL IVPB    Family History    None       Tobacco Use    Smoking status: Not on file    Smokeless tobacco: Not on file   Substance and Sexual Activity    Alcohol use: Not on file    Drug use: Not on file    Sexual activity: Not on file       Review of Systems   Unable to perform ROS: Mental status change     Objective:     Vital Signs (Most Recent):  Temp: 98.3 °F (36.8 °C) (02/07/24 0723)  Pulse: 100 (02/07/24 1000)  Resp: 16 (02/07/24 1000)  BP: (!) 143/72 (02/07/24 1000)  SpO2: 100 % (02/07/24 1000) Vital Signs (24h Range):  Temp:  [97.3 °F (36.3 °C)-99.5 °F (37.5 °C)] 98.3 °F (36.8 °C)  Pulse:  [] 100  Resp:  [2-30] 16  SpO2:  [95 %-100 %] 100 %  BP: (114-197)/(57-95) 143/72  Arterial Line BP: (114-205)/(54-91) 154/69     Weight: (!) 154.2 kg (340 lb)  Body mass index is 56.58 kg/m².       Physical Exam  Constitutional:       Appearance: She is obese.   HENT:      Head: Normocephalic and atraumatic.      Right Ear: External ear normal.      Left Ear: External ear normal.      Nose: Nose normal.      Mouth/Throat:      Mouth: Mucous membranes are dry.   Eyes:      Conjunctiva/sclera: Conjunctivae normal.   Cardiovascular:      Rate and Rhythm: Normal rate.   Pulmonary:      Breath sounds: Normal breath sounds.      Comments: On spontaneous breathing trial, ETT connected to ventilator  Abdominal:      General: Bowel sounds are normal.      Palpations: Abdomen is soft.   Musculoskeletal:         General: Swelling present.    Lymphadenopathy:      Cervical: No cervical adenopathy.   Skin:     General: Skin is warm and dry.   Neurological:      Comments: Not following commands despite no sedation            Review of Symptoms      Symptom Assessment (ESAS 0-10 Scale)  Unable to complete assessment due to Mental status change         Pain Assessment in Advanced Demential Scale (PAINAD)   Breathing - Independent of vocalization:  0  Negative vocalization:  0  Facial expression:  0  Body language:  0  Consolability:  0  Total:  0    Living Arrangements:  Lives with spouse    Psychosocial/Cultural:   See Palliative Psychosocial Note: No  Lives with long-time partner - not legally , together for 26 to 28 years. Has adult daughter (age 31) and adult son, who is in intermediate. Has five grandchildren. Enjoyed watching movies, going out to eat, shopping. Worked as a patient caregiver for blind patients.  **Primary  to Follow**  Palliative Care  Consult: No    Spiritual:  F - Belen and Belief:  Believes in God  I - Importance:  Important  C - Community:  Does not attend Jewish  A - Address in Care:  Engage  support        Advance Care Planning   Advance Directives:   Living Will: No    LaPOST: No    Do Not Resuscitate Status: No    Medical Power of : No      Decision Making:  Family answered questions  Goals of Care: The family endorses that what is most important right now is to focus on curative/life-prolongation (regardless of treatment burdens)    Accordingly, we have decided that the best plan to meet the patient's goals includes continuing with treatment         Significant Labs: CBC:   Recent Labs   Lab 02/06/24  0339 02/07/24  0308   WBC 32.17* 31.47*   HGB 7.9* 8.4*   HCT 23.5* 25.7*    280     CMP:   Recent Labs   Lab 02/06/24  1418 02/06/24  2228 02/07/24  0308    132* 133*  133*   K 4.3 4.4 4.4  4.4    106 104  104   CO2 19* 18* 15*  15*   * 155* 142*  142*    BUN 39* 30* 31*  31*   CREATININE 4.1* 3.1* 3.4*  3.4*   CALCIUM 8.2* 8.5* 8.4*  8.4*   PROT  --   --  7.3   ALBUMIN 1.7* 1.8* 1.8*  1.8*   BILITOT  --   --  0.3   ALKPHOS  --   --  153*   AST  --   --  32   ALT  --   --  13   ANIONGAP 10 8 14  14     CBC:   Recent Labs   Lab 02/07/24 0308   WBC 31.47*   HGB 8.4*   HCT 25.7*   MCV 81*        BMP:  Recent Labs   Lab 02/07/24 0308   *  142*   *  133*   K 4.4  4.4     104   CO2 15*  15*   BUN 31*  31*   CREATININE 3.4*  3.4*   CALCIUM 8.4*  8.4*   MG 2.2  2.2     LFT:  Lab Results   Component Value Date    AST 32 02/07/2024    ALKPHOS 153 (H) 02/07/2024    BILITOT 0.3 02/07/2024     Albumin:   Albumin   Date Value Ref Range Status   02/07/2024 1.8 (L) 3.5 - 5.2 g/dL Final   02/07/2024 1.8 (L) 3.5 - 5.2 g/dL Final     Protein:   Total Protein   Date Value Ref Range Status   02/07/2024 7.3 6.0 - 8.4 g/dL Final     Lactic acid:   Lab Results   Component Value Date    LACTATE 1.5 02/01/2024    LACTATE 2.4 (H) 01/31/2024       Significant Imaging: I have reviewed all pertinent imaging results/findings within the past 24 hours.

## 2024-02-07 NOTE — ASSESSMENT & PLAN NOTE
I have reviewed hospital notes from  SICU service and other specialty providers. I have also reviewed CBC, CMP/BMP,  cultures and imaging with my interpretation as documented.     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 Operative cultures showing P mirabilis. She is afebrile, and with ongoing leukocytosis. S/P wound VAC exchange on 2/6.  Continue ceftriaxone 2 gm IV daily.  Continue metronidazole.   Will follow up culture results.  Surgical debridements as indicated for non viable tissue as per Surgical Service.  Discussed management plan with the staff and/or members from SICU and General Surgery service.

## 2024-02-07 NOTE — SUBJECTIVE & OBJECTIVE
Interval History: sp debridement and WV replacement yesterday. HD completed with 1.5 L removed. No issues with HD. CVP 6.   Remain intubated on 40% FiO2. Electrolytes non emergent.     Review of patient's allergies indicates:  No Known Allergies  Current Facility-Administered Medications   Medication Frequency    0.9%  NaCl infusion PRN    acetaminophen tablet 650 mg Q6H    albuterol-ipratropium 2.5 mg-0.5 mg/3 mL nebulizer solution 3 mL Q4H PRN    amLODIPine tablet 10 mg Daily    calcium gluconate 1 g in NS IVPB (premixed) Q1H    carvediloL tablet 3.125 mg BID    cefTRIAXone (ROCEPHIN) 2 g in dextrose 5 % in water (D5W) 100 mL IVPB (MB+) Q24H    dextrose 10 % infusion Continuous PRN    dextrose 10% bolus 125 mL 125 mL PRN    dextrose 10% bolus 250 mL 250 mL PRN    famotidine tablet 20 mg Daily    glucagon (human recombinant) injection 1 mg PRN    glucose chewable tablet 16 g PRN    glucose chewable tablet 24 g PRN    heparin (porcine) injection 7,500 Units Q8H    hydrALAZINE injection 10 mg Q4H PRN    HYDROmorphone injection 0.5 mg Q4H PRN    insulin aspart U-100 pen 0-10 Units Q4H PRN    insulin aspart U-100 pen 12 Units Q4H    insulin regular in 0.9 % NaCl 100 unit/100 mL (1 unit/mL) infusion Continuous    labetalol 20 mg/4 mL (5 mg/mL) IV syring Q6H PRN    magnesium sulfate 2g in water 50mL IVPB (premix) PRN    metronidazole IVPB 500 mg Q8H    naloxone 0.4 mg/mL injection 0.02 mg PRN    ondansetron injection 4 mg Q6H PRN    oxyCODONE 5 mg/5 mL solution 10 mg Q6H PRN    oxyCODONE 5 mg/5 mL solution 5 mg Q6H PRN    prochlorperazine injection Soln 5 mg Q6H PRN    psyllium husk (aspartame) 3.4 gram packet 1 packet BID    sodium chloride 0.9% bolus 250 mL 250 mL PRN    sodium chloride 0.9% bolus 250 mL 250 mL PRN    sodium chloride 0.9% flush 10 mL PRN    sodium phosphate 20.01 mmol in dextrose 5 % (D5W) 250 mL IVPB PRN    sodium phosphate 30 mmol in dextrose 5 % (D5W) 250 mL IVPB PRN    sodium phosphate 39.99 mmol  in dextrose 5 % (D5W) 250 mL IVPB PRN       Objective:     Vital Signs (Most Recent):  Temp: 98.3 °F (36.8 °C) (02/07/24 0723)  Pulse: 100 (02/07/24 1000)  Resp: 16 (02/07/24 1000)  BP: (!) 143/72 (02/07/24 1000)  SpO2: 100 % (02/07/24 1000) Vital Signs (24h Range):  Temp:  [97.3 °F (36.3 °C)-99.5 °F (37.5 °C)] 98.3 °F (36.8 °C)  Pulse:  [] 100  Resp:  [2-30] 16  SpO2:  [95 %-100 %] 100 %  BP: (114-197)/(57-95) 143/72  Arterial Line BP: (114-205)/(54-91) 154/69     Weight: (!) 154.2 kg (340 lb) (02/07/24 0300)  Body mass index is 56.58 kg/m².  Body surface area is 2.66 meters squared.    I/O last 3 completed shifts:  In: 3982.4 [I.V.:211.7; Other:1350; NG/GT:540; IV Piggyback:1880.7]  Out: 5268 [Urine:105; Other:5063; Stool:100]     Physical Exam  Vitals and nursing note reviewed.   Constitutional:       General: She is not in acute distress.     Appearance: She is obese. She is not ill-appearing or toxic-appearing.      Interventions: She is sedated and intubated.   Eyes:      General: No scleral icterus.        Right eye: No discharge.         Left eye: No discharge.   Cardiovascular:      Rate and Rhythm: Normal rate.   Pulmonary:      Effort: She is intubated.      Comments: Vent Mode: A/C  Oxygen Concentration (%):  [40] 40  Resp Rate Total:  [19 br/min-32 br/min] 22 br/min  Vt Set:  [420 mL] 420 mL  PEEP/CPAP:  [5 cmH20] 5 cmH20  Mean Airway Pressure:  [6.5 agO12-74 cmH20] 11 cmH20      Abdominal:      General: There is distension.   Musculoskeletal:      Right lower leg: Edema present.      Left lower leg: Edema present.          Significant Labs:  CBC:   Recent Labs   Lab 02/07/24 0308   WBC 31.47*   RBC 3.17*   HGB 8.4*   HCT 25.7*      MCV 81*   MCH 26.5*   MCHC 32.7     CMP:   Recent Labs   Lab 02/07/24 0308   *  142*   CALCIUM 8.4*  8.4*   ALBUMIN 1.8*  1.8*   PROT 7.3   *  133*   K 4.4  4.4   CO2 15*  15*     104   BUN 31*  31*   CREATININE 3.4*  3.4*   ALKPHOS  153*   ALT 13   AST 32   BILITOT 0.3     All labs within the past 24 hours have been reviewed.

## 2024-02-07 NOTE — PLAN OF CARE
"Please link this note to the resident's progress note. This note serves as the attestation.    In brief, Sarah Saravia is a 53 year-old woman with a history of hypertension, morbid obesity, renal insufficiency, and type 2 diabetes who was admitted as a transfer for necrotizing soft tissue infection.  She has undergone debridements for treatment.  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     Critical Care issues in this patient include:    Ayaz's gangrene              * Went to the OR yesterday for repeat debridement/wound vac exchange. Continue rocephin and flagyl. ID consulted and are following. Appreciate their recommendations. Wound cultures growing pansensitive proteus mirabilis and now Bacteroides. Blood cultures have been NGTD. Plan for vac change in OR on 2/9/24.     Encephalopathy, metabolic              * Initially thought to be due to sepsis and severe infection. Neurology consulted and are following. EEG done and shows severe slowing and no seizures. MRI brain done today revealed "several scattered punctate acute infarcts throughout the bilateral frontal lobes, centrum semiovale, basal ganglia, corpus callosum, and cerebellar hemispheres" concerning for embolic source. Obtained echo to evaluate for cardiac vegetations or PFO; none noted. Consult Vascular Neurology per Neurology (general). Appreciate their recs. CTA head and neck done and no significant occlusion noted. Recommending now a JAY; will obtain once able.     Acute renal failure with tubular necrosis              * ARF on CKD. Glynn replaced yesterday since bladder scan showed 400 mL. Nephrology has been consulted and are following. Appreciate recs. Received HD yesterday. Trialysis replaced yesterday and functioning without issues. Monitor electrolytes.     Type 2 diabetes mellitus with hyperglycemia              * Endocrinology consulted and are following. Appreciate their " recs. Poorly controlled BG at baseline with recent Hgb A1c at 10.4%. Continue with hourly glucose checks and titrate insulin infusion per protocol.      Encounter for weaning the ventilator              * Remains on pressure support. Cannot extubate secondary to poor mental status. CXR and ABG pending this morning.    Patient remains stable in the ICU. She has multiple infarcts noted on her recent MRI. Unclear of prognosis from a neurological standpoint. Will get Palliative on board to help with discussions of goals of care. Continue tube feeds. Will increase fiber for loose BMs. FMS in place. Continue GI and DVT prophylaxis. Continue ICU care.    Critical care time spent: 38 min    Critical care was time spent personally by me on the following activities: development of treatment plan with patient or surrogate and bedside caregivers, discussions with consultants, evaluation of patient's response to treatment, examination of patient, ordering and performing treatments and interventions, ordering and review of laboratory studies, ordering and review of radiographic studies, pulse oximetry, re-evaluation of patient's condition.  This critical care time did not overlap with that of any other provider or involve time for any procedures.      Karla Connelly MD, FACS  General Surgery and Surgical Critical Care  [facility]

## 2024-02-07 NOTE — PROCEDURES
"Sarah Saravia is a 53 y.o. female patient.    Temp: 98.3 °F (36.8 °C) (02/07/24 0723)  Pulse: 95 (02/07/24 0723)  Resp: 16 (02/07/24 0723)  BP: (!) 114/57 (02/07/24 0700)  SpO2: 99 % (02/07/24 0723)  Weight: (!) 154.2 kg (340 lb) (02/07/24 0300)  Height: 5' 5" (165.1 cm) (02/06/24 1430)       Central Line    Date/Time: 2/7/2024 7:49 AM    Performed by: Chirag Borges MD  Authorized by: Chirag Borges MD    Location procedure was performed:  [facility] CRITICAL CARE SURGERY  Consent Done ?:  Yes  Time out complete?: Verified correct patient, procedure, equipment, staff, and site/side    Indications:  Hemodialysis  Anesthesia:  General anesthesia  Preparation:  Skin prepped with ChloraPrep  Skin prep agent dried: Skin prep agent completely dried prior to procedure    Sterile barriers: All five maximal sterile barriers used - gloves, gown, cap, mask and large sterile sheet    Hand hygiene: Hand hygiene performed immediately prior to central venous catheter insertion    Location:  Right internal jugular  Catheter type:  Trialysis  Catheter size:  13 Fr  Ultrasound guidance: Yes    Vessel Caliber:  Large   patent  Comprressibility:  Normal  Guidewire confirmed in vessel.    Steril sheath on probe.    Sterile gel used.  Manometry: No    Number of attempts:  1  Securement:  Line sutured, chlorhexidine patch, sterile dressing applied and blood return through all ports  Complications: No    XRay:  Placement verified by x-ray, no pneumothorax on x-ray and successful placement  Adverse Events:  NoneTermination Site: superior vena cava      2/7/2024    "

## 2024-02-07 NOTE — PLAN OF CARE
"      SICU PLAN OF CARE NOTE    Dx: Ayaz's gangrene    Shift Events: No acute event overnight. Tolerated HD well with 1.5L fluid removal. A couple episodes of breath stacking/vent dyssynchrony noted which resolved with pain meds. No changes in neuro status.     Goals of Care: Continue monitoring fluid and neuro status.     Neuro: Opened eyes to pain stimuli. Reflexes intact, PERRLA.     Vital Signs: /66   Pulse 99   Temp 98.8 °F (37.1 °C) (Axillary)   Resp (!) 25   Ht 5' 5" (1.651 m)   Wt (!) 154.2 kg (340 lb)   LMP 01/01/2024 (Approximate) Comment: tubal ligation  SpO2 100%   BMI 56.58 kg/m²     Cardiac: SR-ST, SBP < 170    Respiratory: Ventilator    Diet: Tube Feeds    Gtts: Insulin    Urine Output: Urinary Catheter 100 cc/shift, 3hrs of HD with 1.5L fluid removed.     Drains: Wound Vac with 300cc output/shift.      Labs/Accuchecks: Renal/Mg q8h, Accucheck q4h.    Skin: Wound care provided, no new skin issue, turned q2h.        "

## 2024-02-07 NOTE — SUBJECTIVE & OBJECTIVE
Interval History: OR yesterday for wound vac change. No changes overnight.     Medications:  Continuous Infusions:   dextrose 10 % in water (D10W)      insulin regular 1 units/mL infusion orderable (TRANSFER) 0.8 Units/hr (02/07/24 0851)     Scheduled Meds:   sodium chloride 0.9%   Intravenous Once    acetaminophen  650 mg Per OG tube Q6H    amLODIPine  10 mg Per OG tube Daily    calcium gluconate IVPB  1 g Intravenous Q1H    carvediloL  3.125 mg Per OG tube BID    cefTRIAXone (Rocephin) IV (PEDS and ADULTS)  2 g Intravenous Q24H    famotidine  20 mg Per OG tube Daily    heparin (porcine)  7,500 Units Subcutaneous Q8H    insulin aspart U-100  12 Units Subcutaneous Q4H    metronidazole  500 mg Intravenous Q8H    psyllium husk (aspartame)  1 packet Per OG tube BID     PRN Meds:sodium chloride 0.9%, sodium chloride 0.9%, albuterol-ipratropium, dextrose 10 % in water (D10W), dextrose 10%, dextrose 10%, glucagon (human recombinant), glucose, glucose, hydrALAZINE, HYDROmorphone, insulin aspart U-100, labetalol, magnesium sulfate IVPB, naloxone, ondansetron, oxyCODONE, oxyCODONE, prochlorperazine, sodium chloride 0.9%, sodium chloride 0.9%, sodium chloride 0.9%, sodium phosphate 20.01 mmol in dextrose 5 % (D5W) 250 mL IVPB, sodium phosphate 30 mmol in dextrose 5 % (D5W) 250 mL IVPB, sodium phosphate 39.99 mmol in dextrose 5 % (D5W) 250 mL IVPB     Review of patient's allergies indicates:  No Known Allergies  Objective:     Vital Signs (Most Recent):  Temp: 98.3 °F (36.8 °C) (02/07/24 0723)  Pulse: 95 (02/07/24 0800)  Resp: 16 (02/07/24 0800)  BP: (!) 153/72 (02/07/24 0849)  SpO2: 100 % (02/07/24 0800) Vital Signs (24h Range):  Temp:  [97.3 °F (36.3 °C)-99.5 °F (37.5 °C)] 98.3 °F (36.8 °C)  Pulse:  [] 95  Resp:  [2-30] 16  SpO2:  [95 %-100 %] 100 %  BP: (114-197)/(57-95) 153/72  Arterial Line BP: (114-205)/(54-91) 138/65     Weight: (!) 154.2 kg (340 lb)  Body mass index is 56.58 kg/m².    Intake/Output - Last 3  Shifts         02/05 0700 02/06 0659 02/06 0700 02/07 0659 02/07 0700 02/08 0659    I.V. (mL/kg) 150 (0.9) 83.8 (0.5) 11.6 (0.1)    Other 750 600     NG/ 360 60    IV Piggyback 1834.9 297.9     Total Intake(mL/kg) 3034.9 (18.7) 1341.6 (8.7) 71.6 (0.5)    Urine (mL/kg/hr) 20 (0) 105 (0) 0 (0)    Drains       Other 2738 2400     Stool 70 100     Total Output 2828 2605 0    Net +206.9 -1263.4 +71.6                    Physical Exam  Vitals reviewed.   Constitutional:       General: She is not in acute distress.     Appearance: She is obese. She is ill-appearing.   HENT:      Head: Normocephalic.   Neck:      Comments: Right IJ trialysis   Cardiovascular:      Rate and Rhythm: Regular rhythm.      Pulses: Normal pulses.   Pulmonary:      Effort: Pulmonary effort is normal.      Comments: Mechanically ventilated   Abdominal:      Palpations: Abdomen is soft.      Tenderness: There is no abdominal tenderness.   Genitourinary:     Comments: Glynn in place with clear yellow urine   Musculoskeletal:      Comments: Wound vac in place with adequate seal. Surrounding skin soft edematous, no crepitus    Skin:     General: Skin is warm and dry.   Neurological:      General: No focal deficit present.          Significant Labs:  I have reviewed all pertinent lab results within the past 24 hours.  CBC:   Recent Labs   Lab 02/07/24  0308   WBC 31.47*   RBC 3.17*   HGB 8.4*   HCT 25.7*      MCV 81*   MCH 26.5*   MCHC 32.7     CMP:   Recent Labs   Lab 02/07/24  0308   *  142*   CALCIUM 8.4*  8.4*   ALBUMIN 1.8*  1.8*   PROT 7.3   *  133*   K 4.4  4.4   CO2 15*  15*     104   BUN 31*  31*   CREATININE 3.4*  3.4*   ALKPHOS 153*   ALT 13   AST 32   BILITOT 0.3       Significant Diagnostics:  I have reviewed all pertinent imaging results/findings within the past 24 hours.

## 2024-02-07 NOTE — PROGRESS NOTES
[facility]  Critical Care - Surgery  Progress Note    Patient Name: Sarah Saravia  MRN: [id number]  Admission Date: 1/29/2024  Hospital Length of Stay: 9 days  Code Status: Full Code  Attending Provider: Steve Cole MD  [facility]   Principal Problem: Ayaz's gangrene    Subjective:     Hospital/ICU Course:  No notes on file    Interval History/Significant Events: NAEON. Went for debridement & WV replacement yesterday. Tolerated HD overnight.     Follow-up For: Procedure(s) (LRB):  DEBRIDEMENT, WOUND (Bilateral)    Post-Operative Day: 4 Days Post-Op    Objective:     Vital Signs (Most Recent):  Temp: 99.4 °F (37.4 °C) (02/06/24 0400)  Pulse: 94 (02/06/24 0700)  Resp: (!) 23 (02/06/24 0700)  BP: 132/62 (02/06/24 0700)  SpO2: 100 % (02/06/24 0700) Vital Signs (24h Range):  Temp:  [98.3 °F (36.8 °C)-100.3 °F (37.9 °C)] 99.4 °F (37.4 °C)  Pulse:  [] 94  Resp:  [14-28] 23  SpO2:  [98 %-100 %] 100 %  BP: (132-174)/(62-94) 132/62  Arterial Line BP: (130-179)/(59-88) 130/59     Weight: (!) 162 kg (357 lb 2.3 oz)  Body mass index is 57.67 kg/m².      Intake/Output Summary (Last 24 hours) at 2/6/2024 0741  Last data filed at 2/6/2024 0701  Gross per 24 hour   Intake 3002.07 ml   Output 2788 ml   Net 214.07 ml       Physical Exam  Vitals reviewed.   Constitutional:       Appearance: She is obese.   HENT:      Head: Normocephalic.      Right Ear: Tympanic membrane normal.      Nose: Nose normal.      Mouth/Throat:      Mouth: Mucous membranes are moist.   Eyes:      Pupils: Brisk Corneal and Pupillary Reflexes   Cardiovascular:      Rate and Rhythm: Normal rate and regular rhythm.   Pulmonary:      Effort: Pulmonary effort is normal.      Breath sounds: Normal breath sounds.      Intubated  Abdominal:      General: Abdomen is flat.      Palpations: Abdomen is soft.   Genitourinary:     General: Normal vulva.   Musculoskeletal:          General: Normal range of motion.   Skin:     General: Skin is warm.      Capillary Refill: Capillary refill takes less than 2 seconds.   Neurological:      Mental Status: She is alert.      Comments: On the vent; withdraws to pain but eyes not opening to pain  Psychiatric:         Mood and Affect: Mood normal       Vents:  Vent Mode: Spont (02/07/24 0355)  Set Rate: 18 BPM (02/06/24 0349)  Vt Set: 420 mL (02/06/24 0349)  Pressure Support: 10 cmH20 (02/07/24 0355)  PEEP/CPAP: 5 cmH20 (02/07/24 0355)  Oxygen Concentration (%): 40 (02/07/24 0723)  Peak Airway Pressure: 15 cmH20 (02/07/24 0355)  Plateau Pressure: 15 cmH20 (02/07/24 0355)  Total Ve: 4.43 L/m (02/07/24 0355)  Negative Inspiratory Force (cm H2O): 0 (02/07/24 0355)  F/VT Ratio<105 (RSBI): (!) 57.49 (02/07/24 0355)    Lines/Drains/Airways       Central Venous Catheter Line  Duration             Trialysis (Dialysis) Catheter 01/31/24 1051 right internal jugular 5 days              Drain  Duration                  NG/OG Tube 02/01/24 2250 18 Fr. Center mouth 4 days         Rectal Tube 02/04/24 1545 1 day              Airway  Duration                  Airway - Non-Surgical 01/31/24 1020 5 days              Arterial Line  Duration             Arterial Line 01/31/24 1025 Right Radial 5 days              Peripheral Intravenous Line  Duration                  Peripheral IV - Single Lumen 02/04/24 2241 20 G Right Antecubital 1 day                    Significant Labs:    CBC/Anemia Profile:  Recent Labs   Lab 02/05/24 0321 02/06/24  0339   WBC 29.32* 32.17*   HGB 8.3* 7.9*   HCT 24.6* 23.5*    246   MCV 77* 79*   RDW 15.1* 15.8*        Chemistries:  Recent Labs   Lab 02/05/24  0321 02/05/24  1348 02/05/24  2259 02/06/24  0339     135* 136 137 136   K 3.9  3.8 3.8 4.1 3.9     105 106 106 105   CO2 19*  20* 19* 21* 23   BUN 52*  52* 63* 35* 30*   CREATININE 5.2*  5.2* 5.8* 3.5* 3.4*   CALCIUM 8.2*  8.1* 8.4* 8.1* 8.1*   ALBUMIN 1.6*  1.6*  1.6* 1.7* 1.6*   PROT 6.8  --   --   --    BILITOT 0.3  --   --   --    ALKPHOS 150*  --   --   --    ALT 15  --   --   --    AST 30  --   --   --    MG 2.1 2.2 1.9 1.8   PHOS 4.3  4.3 4.8* 2.8 3.1  3.1       All pertinent labs within the past 24 hours have been reviewed.    Significant Imaging:  I have reviewed all pertinent imaging results/findings within the past 24 hours.  Assessment/Plan:     Renal/  * Ayaz's gangrene    Neuro/Psych:   #Acute Encephalopathy  CTH 2/3 with scattered hypoattenuation likely representing age indeterminate infarcts. EEG findings consistent with moderate-severe encephalopathy.  -- Sedation: None  -- Pain: kermit tylenol, dialudid and oxy prn  -- GCS 7T: E2, V1T, M4; exam stable  -- Neurology following; appreciate recs  -- MRI 2/6: Several scattered punctate acute infarcts throughout the bilateral frontal lobes, centrum semiovale, basal ganglia, corpus callosum, and cerebellar hemispheres.  Overall distribution is concerning for embolic etiology   -- CTA 2/6: Atherosclerotic plaquing of the carotid bifurcations and proximal ICAs with less than 50% proximal ICA stenosis by NASCET criteria              Cards:   -- HDS  -- goal SBP < 180, MAP >65  -- hypertensive, hydralazine and labetalol prns  -- Continue amlodipine 10mg  -- ECHO 2/6: Unremarkable.   -- [facility] neuro recommend JAY to r/o vegetation.      Pulm:   #Acute Respiratory Failure  -- Intubated on spontaneous.  -- Goal O2 sat > 90%  -- CXR stable      Renal:  #ALVARADO on CKD  Received HD 2/6 without issue. ATN.   -- Nephrology following; appreciate recs  -- RRT as needed per nephrology  -- Stool output 70  -- Urine output: 50cc  Recent Labs   Lab 02/06/24  1418 02/06/24  2228 02/07/24  0308   BUN 39* 30* 31*  31*   CREATININE 4.1* 3.1* 3.4*  3.4*        FEN / GI:   -- Daily CMPs  -- NGT in place   -- Replace lytes as needed  -- Nutrition: NPO, TF (Novasource Renal) at goal 30 ml/hr   -- GI PPX   -- Nutrition following;  appreciate recs  -- Continue metamucil       ID:    #Ayaz's gangrene  s/p OR debridement for nec fascitis. R groin tissue with proteus, sensitive to ceftriaxone. Growing bacteroids as well.   -- Abx: ceftriaxone and flagyl  -- ID following ; appreciate recs  -- Debridement and wound vac change 2/7  Recent Labs   Lab 02/05/24  0321 02/06/24  0339 02/07/24  0308   WBC 29.32* 32.17* 31.47*        Heme/Onc:  -- Daily CBC  -- Heparin DVT ppx  Recent Labs   Lab 02/01/24  2246 02/02/24  0335 02/05/24  0321 02/06/24  0339 02/07/24  0308   HGB  --    < > 8.3* 7.9* 8.4*   PLT  --    < > 227 246 280   APTT 27.8  --   --   --   --    INR 1.0  --   --   --   --     < > = values in this interval not displayed.        Endo:   #IDDM  Initially presented to OSH with DKA with latest A1C 10.4 AG Gap resolved  - Increasing insulin prn requirements since starting tube feeds  - Placed on insulin gtt 2/3: Transition IV insulin infusion at 1.8 u/hr with stepdown parameters   - Increased Novolog to 10 units units q 4 hrs while on Tube feeds (HOLD if tube feeds are stopped or BG < 100)   - Moderate dose SSI  - Endocrine following, appreciate recs        PPx:   Feeding: NPO, TF @ 30  Analgesia/Sedation: See above  Thromboembolic prevention: SQH   HOB >30: Y  Stress Ulcer ppx: Famotidine  Glucose control: Critical care goal 140-180 g/dl, Insulin gtt, kermit novolog, SSI, Endo following  Bowel reg: N/A  Invasive Lines/Drains/Airway: Glynn, ETT, Art line, Trialysis, PIV x2, Rectal tube      Dispo/Code Status/Palliative:   -- SICU / Full Code          Chirag Borges MD  Critical Care - Surgery  [facility]

## 2024-02-07 NOTE — SUBJECTIVE & OBJECTIVE
"Interval HPI:   Overnight events: Remains intubated in SICU. POD 1. BG within goal ranges with IV insulin infusion at 0.8 u/hr and scheduled Novolog. Tube feeds infusing. Creatinine 3.4. Diet NPO    Eating:   NPO  Nausea: No  Hypoglycemia and intervention: No  Fever: No  TPN and/or TF: Yes  If yes, type of TF/TPN and rate: TFs at 30 ml/hr     BP (!) 157/74 (BP Location: Left forearm, Patient Position: Lying)   Pulse 98   Temp 98.3 °F (36.8 °C) (Oral)   Resp 16   Ht 5' 5" (1.651 m)   Wt (!) 154.2 kg (340 lb)   LMP 01/01/2024 (Approximate) Comment: tubal ligation  SpO2 100%   BMI 56.58 kg/m²     Labs Reviewed and Include    Recent Labs   Lab 02/07/24  0308   *  142*   CALCIUM 8.4*  8.4*   ALBUMIN 1.8*  1.8*   PROT 7.3   *  133*   K 4.4  4.4   CO2 15*  15*     104   BUN 31*  31*   CREATININE 3.4*  3.4*   ALKPHOS 153*   ALT 13   AST 32   BILITOT 0.3     Lab Results   Component Value Date    WBC 31.47 (H) 02/07/2024    HGB 8.4 (L) 02/07/2024    HCT 25.7 (L) 02/07/2024    MCV 81 (L) 02/07/2024     02/07/2024     No results for input(s): "TSH", "FREET4" in the last 168 hours.  Lab Results   Component Value Date    HGBA1C 10.4 (H) 01/30/2024       Nutritional status:   Body mass index is 56.58 kg/m².  Lab Results   Component Value Date    ALBUMIN 1.8 (L) 02/07/2024    ALBUMIN 1.8 (L) 02/07/2024    ALBUMIN 1.8 (L) 02/06/2024     No results found for: "PREALBUMIN"    Estimated Creatinine Clearance: 29 mL/min (A) (based on SCr of 3.4 mg/dL (H)).    Accu-Checks  Recent Labs     02/06/24  0032 02/06/24  0411 02/06/24  0527 02/06/24  0736 02/06/24  1234 02/06/24  1621 02/06/24  2022 02/07/24  0008 02/07/24  0306 02/07/24  0836   POCTGLUCOSE 136* 97 112* 145* 151* 167* 174* 144* 137* 154*       Current Medications and/or Treatments Impacting Glycemic Control  Immunotherapy:    Immunosuppressants       None          Steroids:   Hormones (From admission, onward)      None          Pressors: "    Autonomic Drugs (From admission, onward)      None          Hyperglycemia/Diabetes Medications:   Antihyperglycemics (From admission, onward)      Start     Stop Route Frequency Ordered    02/07/24 0845  insulin aspart U-100 pen 12 Units         -- SubQ Every 4 hours 02/07/24 0838    02/07/24 0830  insulin regular in 0.9 % NaCl 100 unit/100 mL (1 unit/mL) infusion        Question:  Enter initial dose (Units/hr):  Answer:  0.8    -- IV Continuous 02/07/24 0828    02/03/24 1010  insulin aspart U-100 pen 0-10 Units         -- SubQ Every 4 hours PRN 02/03/24 0911

## 2024-02-07 NOTE — PROGRESS NOTES
During 1501 assessment, inner right gaze noted to patient's left eye which was not previously noted on prior exams; otherwise all neurological assessment remained unchanged. Notified Dr. Borges who presented to bedside. Provider notified Vascular Neurology and vascular neurology presented to bedside. STAT CT Head ordered.     Patient transported to CT with RN x 1, respiratory therapist x 1, and PCT x 2. Patient connected to portable monitor and ventilator. VS remained stable throughout transport and since returning to room.

## 2024-02-07 NOTE — BRIEF OP NOTE
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 Minimal sharp debridement required. After adequate debridement of both the groin and buttock we measured both areas to be 14 x 4 x 7 cm (groin) and 35 x 12 x 4 cm (buttock). Wound vac x2 placed. Hemostasis achieved.     Estimated Blood Loss: * No values recorded between 2/6/2024 11:14 AM and 2/6/2024 11:57 AM *    Estimated Blood Loss has been documented.         Specimens:   Specimen (24h ago, onward)      None            [id number]

## 2024-02-07 NOTE — PROGRESS NOTES
[facility]  Nephrology  Progress Note    Patient Name: Sarah Saravia  MRN: [id number]  Admission Date: 1/29/2024  Hospital Length of Stay: 9 days  Attending Provider: Steve Cole MD   Primary Care Physician: Patricia [facility]  Principal Problem:Ayaz's gangrene    Subjective:     Interval History: sp debridement and WV replacement yesterday. HD completed with 1.5 L removed. No issues with HD. CVP 6.   Remain intubated on 40% FiO2. Electrolytes non emergent.     Review of patient's allergies indicates:  No Known Allergies  Current Facility-Administered Medications   Medication Frequency    0.9%  NaCl infusion PRN    acetaminophen tablet 650 mg Q6H    albuterol-ipratropium 2.5 mg-0.5 mg/3 mL nebulizer solution 3 mL Q4H PRN    amLODIPine tablet 10 mg Daily    calcium gluconate 1 g in NS IVPB (premixed) Q1H    carvediloL tablet 3.125 mg BID    cefTRIAXone (ROCEPHIN) 2 g in dextrose 5 % in water (D5W) 100 mL IVPB (MB+) Q24H    dextrose 10 % infusion Continuous PRN    dextrose 10% bolus 125 mL 125 mL PRN    dextrose 10% bolus 250 mL 250 mL PRN    famotidine tablet 20 mg Daily    glucagon (human recombinant) injection 1 mg PRN    glucose chewable tablet 16 g PRN    glucose chewable tablet 24 g PRN    heparin (porcine) injection 7,500 Units Q8H    hydrALAZINE injection 10 mg Q4H PRN    HYDROmorphone injection 0.5 mg Q4H PRN    insulin aspart U-100 pen 0-10 Units Q4H PRN    insulin aspart U-100 pen 12 Units Q4H    insulin regular in 0.9 % NaCl 100 unit/100 mL (1 unit/mL) infusion Continuous    labetalol 20 mg/4 mL (5 mg/mL) IV syring Q6H PRN    magnesium sulfate 2g in water 50mL IVPB (premix) PRN    metronidazole IVPB 500 mg Q8H    naloxone 0.4 mg/mL injection 0.02 mg PRN    ondansetron injection 4 mg Q6H PRN    oxyCODONE 5 mg/5 mL solution 10 mg Q6H PRN    oxyCODONE 5 mg/5 mL solution 5 mg Q6H PRN    prochlorperazine injection Soln 5 mg Q6H PRN    psyllium husk  (aspartame) 3.4 gram packet 1 packet BID    sodium chloride 0.9% bolus 250 mL 250 mL PRN    sodium chloride 0.9% bolus 250 mL 250 mL PRN    sodium chloride 0.9% flush 10 mL PRN    sodium phosphate 20.01 mmol in dextrose 5 % (D5W) 250 mL IVPB PRN    sodium phosphate 30 mmol in dextrose 5 % (D5W) 250 mL IVPB PRN    sodium phosphate 39.99 mmol in dextrose 5 % (D5W) 250 mL IVPB PRN       Objective:     Vital Signs (Most Recent):  Temp: 98.3 °F (36.8 °C) (02/07/24 0723)  Pulse: 100 (02/07/24 1000)  Resp: 16 (02/07/24 1000)  BP: (!) 143/72 (02/07/24 1000)  SpO2: 100 % (02/07/24 1000) Vital Signs (24h Range):  Temp:  [97.3 °F (36.3 °C)-99.5 °F (37.5 °C)] 98.3 °F (36.8 °C)  Pulse:  [] 100  Resp:  [2-30] 16  SpO2:  [95 %-100 %] 100 %  BP: (114-197)/(57-95) 143/72  Arterial Line BP: (114-205)/(54-91) 154/69     Weight: (!) 154.2 kg (340 lb) (02/07/24 0300)  Body mass index is 56.58 kg/m².  Body surface area is 2.66 meters squared.    I/O last 3 completed shifts:  In: 3982.4 [I.V.:211.7; Other:1350; NG/GT:540; IV Piggyback:1880.7]  Out: 5268 [Urine:105; Other:5063; Stool:100]     Physical Exam  Vitals and nursing note reviewed.   Constitutional:       General: She is not in acute distress.     Appearance: She is obese. She is not ill-appearing or toxic-appearing.      Interventions: She is sedated and intubated.   Eyes:      General: No scleral icterus.        Right eye: No discharge.         Left eye: No discharge.   Cardiovascular:      Rate and Rhythm: Normal rate.   Pulmonary:      Effort: She is intubated.      Comments: Vent Mode: A/C  Oxygen Concentration (%):  [40] 40  Resp Rate Total:  [19 br/min-32 br/min] 22 br/min  Vt Set:  [420 mL] 420 mL  PEEP/CPAP:  [5 cmH20] 5 cmH20  Mean Airway Pressure:  [6.5 tvT82-08 cmH20] 11 cmH20      Abdominal:      General: There is distension.   Musculoskeletal:      Right lower leg: Edema present.      Left lower leg: Edema present.          Significant Labs:  CBC:   Recent Labs    Lab 02/07/24  0308   WBC 31.47*   RBC 3.17*   HGB 8.4*   HCT 25.7*      MCV 81*   MCH 26.5*   MCHC 32.7     CMP:   Recent Labs   Lab 02/07/24  0308   *  142*   CALCIUM 8.4*  8.4*   ALBUMIN 1.8*  1.8*   PROT 7.3   *  133*   K 4.4  4.4   CO2 15*  15*     104   BUN 31*  31*   CREATININE 3.4*  3.4*   ALKPHOS 153*   ALT 13   AST 32   BILITOT 0.3     All labs within the past 24 hours have been reviewed.     Assessment/Plan:     Renal/  * Ayaz's gangrene  -management per primary     ALVARADO (acute kidney injury)  Transfer from [facility]. Admitted for fourniers gangrene. Initiated HD on 1/31. ALVARADO 2/2 ATN in setting of septic shock. Arrived with CR 3.8. Unknown baseline.  Plan to OR today. Net -1.3L.OR today for debridement with possible closure and wound vac placement     ALVARADO most likley ATN in setting of septic shock     Plan/Recommendation  -No HD requirements today. Tolerated HD yesterday with no issues. Net UF 1.5 L.   -Will eval RRT needs daily   -Daily RFP   -Strict I&Os  -Avoid nephrotoxins, if possible         Thank you for your consult. I will follow-up with patient. Please contact us if you have any additional questions.    Shwetha Gregory DNP, FNP-C  Nephrology  [facility]

## 2024-02-07 NOTE — HOSPITAL COURSE
02/07/2024: Worsening neurological exam (disconjugate gaze, L>R). NIH 30. Stat CTH.   02/10/2024: NIH 30. Remains in NCC. Intubated. MRI/MRA completed, showing left temporal lobe lesion improving and less prominent. MRV was negative for venous sinus thrombosis.   2-16-24 attempted LP with anesthesiology yesterday and unsuccessful x 3 tries , recommend doing under fluoro with IR

## 2024-02-07 NOTE — PROGRESS NOTES
HD tx completed and pt tolerated well.  Net UF: 1.5L as ordered  Tx time: 3hrs.  No distress noted.

## 2024-02-07 NOTE — SUBJECTIVE & OBJECTIVE
Interval History: ".  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 Taken to OR on 2/2 for debridement of the right buttocks and groin (#3). Evaluated by Neurology on 2/3 for encephalopathy. CT head with remote infarcts. Not withdrawing to pain on the morning of 2/5.    MRI done on 2/6 with embolic infarcts. S/P wound VAC exchange on 2/6. MRI brain revealed multiple embolic infarcts.     Review of Systems   Unable to perform ROS: Intubated     Objective:     Vital Signs (Most Recent):  Temp: 98.2 °F (36.8 °C) (02/07/24 1215)  Pulse: 93 (02/07/24 1215)  Resp: 19 (02/07/24 1215)  BP: 134/63 (02/07/24 1200)  SpO2: 100 % (02/07/24 1215) Vital Signs (24h Range):  Temp:  [98.2 °F (36.8 °C)-99.5 °F (37.5 °C)] 98.2 °F (36.8 °C)  Pulse:  [] 93  Resp:  [2-30] 19  SpO2:  [97 %-100 %] 100 %  BP: (114-174)/(57-95) 134/63  Arterial Line BP: (114-177)/(54-89) 149/67     Weight: (!) 154.2 kg (340 lb)  Body mass index is 56.58 kg/m².    Estimated Creatinine Clearance: 29 mL/min (A) (based on SCr of 3.4 mg/dL (H)).     Physical Exam  Vitals and nursing note reviewed.   Constitutional:       Appearance: She is well-developed. She is ill-appearing.      Interventions: She is intubated.   HENT:      Head: Normocephalic and atraumatic.      Right Ear: External ear normal.      Left Ear: External ear normal.   Eyes:      General: No scleral icterus.        Right eye: No discharge.         Left eye: No discharge.      Conjunctiva/sclera: Conjunctivae normal.   Cardiovascular:      Rate and Rhythm: Normal rate and regular rhythm.      Heart sounds: Normal heart sounds. No murmur heard.     No friction rub. No gallop.   Pulmonary:      Effort: Pulmonary effort is normal. No respiratory distress. She is intubated.      Breath sounds: No stridor. Rhonchi present. No wheezing or rales.   Abdominal:      General: Bowel sounds are normal.   Musculoskeletal:         General: No tenderness.    Skin:     General: Skin is warm and dry.      Comments: Wound VAC on right groin area   Neurological:      Mental Status: She is lethargic.      Comments: Opens eyes to verbal and tactile stimuli.          Significant Labs: BMP:   Recent Labs   Lab 02/07/24  0308   *  142*   *  133*   K 4.4  4.4     104   CO2 15*  15*   BUN 31*  31*   CREATININE 3.4*  3.4*   CALCIUM 8.4*  8.4*   MG 2.2  2.2       CBC:   Recent Labs   Lab 02/06/24  0339 02/07/24  0308   WBC 32.17* 31.47*   HGB 7.9* 8.4*   HCT 23.5* 25.7*    280       Microbiology Results (last 7 days)       Procedure Component Value Units Date/Time    Culture, Anaerobe [[id number]]  (Abnormal) Collected: 01/30/24 0228    Order Status: Completed Specimen: Wound from Groin Updated: 02/07/24 0748     Anaerobic Culture No anaerobes isolated      BACTEROIDES SPECIES  Few      Narrative:      Right groin tissue    Culture, Anaerobe [[id number]] Collected: 01/30/24 0219    Order Status: Completed Specimen: Abscess from Groin Updated: 02/07/24 0748     Anaerobic Culture No anaerobes isolated    Narrative:      Right groin abcess    Blood culture x two cultures. Draw prior to antibiotics. [[id number]] Collected: 01/29/24 2118    Order Status: Completed Specimen: Blood from Peripheral, Antecubital, Left Updated: 02/02/24 2303     Blood Culture, Routine No Growth after 4 days.    Narrative:      Aerobic and anaerobic    Blood culture x two cultures. Draw prior to antibiotics. [[id number]] Collected: 01/29/24 1929    Order Status: Completed Specimen: Blood from Peripheral, Antecubital, Right Updated: 02/02/24 2103     Blood Culture, Routine No Growth after 4 days.    Narrative:      Aerobic and anaerobic    Aerobic culture [[id number]]  (Abnormal)  (Susceptibility) Collected: 01/30/24 0228    Order Status: Completed Specimen: Wound from Groin Updated: 02/02/24 0746     Aerobic Bacterial Culture PROTEUS MIRABILIS  Moderate      Narrative:       Right groin tissue    Aerobic culture [[id number]]  (Abnormal)  (Susceptibility) Collected: 01/30/24 0219    Order Status: Completed Specimen: Abscess from Groin Updated: 02/02/24 0746     Aerobic Bacterial Culture PROTEUS MIRABILIS  Moderate      Narrative:      Right groin abcess    AFB Culture & Smear [[id number]] Collected: 01/30/24 0219    Order Status: Completed Specimen: Abscess from Groin Updated: 01/31/24 2127     AFB Culture & Smear Culture in progress     AFB CULTURE STAIN No acid fast bacilli seen.    Narrative:      Right groin abcess    AFB Culture & Smear [[id number]] Collected: 01/30/24 0228    Order Status: Completed Specimen: Wound from Groin Updated: 01/31/24 2127     AFB Culture & Smear Culture in progress     AFB CULTURE STAIN No acid fast bacilli seen.    Narrative:      Right groin tissue            Significant Imaging: I have reviewed all pertinent imaging results/findings within the past 24 hours.

## 2024-02-07 NOTE — OP NOTE
[facility]  General Surgery  Operative Note    SUMMARY     Date of Procedure: 2/6/2024     Procedure: Procedure(s) (LRB):  Excisional sharp debridement of skin, subcutaneous tissue, and muscle of the right groin measuring 14 x 7cm  Excisional sharp debridement of skin, subcutaneous tissue, and muscle of the right thigh and buttocks measuring 35 x 12cm   Negative pressure wound therapy placement >50cm^2 to right groin  Negative pressure wound therapy placement >50cm^2 to right thigh    Surgeon(s) and Role:     * Steve Cole MD - Primary     * Celia Allison MD - Resident - Assisting    Pre-Operative Diagnosis: 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 who was admitted to SICU as a transfer for Ayaz's gangrene of the right proximal medial thigh s/p OR debridement x3. She was taken to the OR today for repeat washout and wound vac placement    Procedure in Detail:   The patient was preoperatively evaluated and consent was obtained from [facility]. She was taken back to the operating room, placed on the operating table, and monitors were applied. She then underwent the smooth induction of anesthesia. The right buttock and groin was prepped and draped in the usual sterile fashion. A time out was performed prior to the start of the case. When all were in agreement, we proceeded with the case. The wound was evaluated for any necrotic or grossly infected tissue. Using both sharp and electrocautery, any necrotic tissue was dissected until bleeding tissue was present. Care was taken not to dissect any healthy tissue. After adequate debridement of both the groin and buttock we measured both areas to be 14 x 4 x 7 cm (groin) and 35 x 12 x 4 cm (buttock). Both wound were hemostatic at the end of the case. A wound vac was then placed over both wounds and y-ed  together. Adequate suction/seal was achieved. Patient was transported to the SICU intubated and in stable condition. Dr. Cole was present for oshea portions of the case.         Complications: No    Estimated Blood Loss (EBL): * No values recorded between 2/2/2024  1:43 PM and 2/2/2024  2:37 PM *           Implants: * No implants in log *    Specimens:   Specimen (24h ago, onward)      None                    Condition: Serious    Disposition: ICU - intubated and hemodynamically stable.    Attestation: Dr. Cole was present and scrubbed for all key portions of the case.    I was present for the procedure including critical portions of the procedure.    Steve Cole MD  Acute Care Surgery & Surgical Critical Care  [facility]

## 2024-02-07 NOTE — PROGRESS NOTES
[facility]ensive [facility]  General Surgery  Progress Note    Subjective:     History of Present Illness:  53 year old morbidly obese female who does not routinely go to the doctor presents to the ED with malaise, nausea, vomiting, and groin, buttock, perineal swelling and tenderness. She began feeling ill a few days ago.     On arrival to the ED she is tachycardic to 120, hypertensive without fever. Labs demonstrate leukocytosis of 21, , with lactic acidosis and associated ALVARADO with cr 3.8, hyperglycemia with blood glucose of 824 accompanied by severe electrolyte derangements. CT imaging obtained demonstrates diffuse subcutaneous air along buttocks, perineum, anterior vulva, as well as bilateral thighs.     No prior abdominal surgeries. She denies problems with her heart or lungs. Non smoker. Does not routinely take any medications.    Post-Op Info:  Procedure(s) (LRB):  DEBRIDEMENT, WOUND, replace wound vac, possible closure (Right)   1 Day Post-Op     Interval History: OR yesterday for wound vac change. No changes overnight.     Medications:  Continuous Infusions:   dextrose 10 % in water (D10W)      insulin regular 1 units/mL infusion orderable (TRANSFER) 0.8 Units/hr (02/07/24 0851)     Scheduled Meds:   sodium chloride 0.9%   Intravenous Once    acetaminophen  650 mg Per OG tube Q6H    amLODIPine  10 mg Per OG tube Daily    calcium gluconate IVPB  1 g Intravenous Q1H    carvediloL  3.125 mg Per OG tube BID    cefTRIAXone (Rocephin) IV (PEDS and ADULTS)  2 g Intravenous Q24H    famotidine  20 mg Per OG tube Daily    heparin (porcine)  7,500 Units Subcutaneous Q8H    insulin aspart U-100  12 Units Subcutaneous Q4H    metronidazole  500 mg Intravenous Q8H    psyllium husk (aspartame)  1 packet Per OG tube BID     PRN Meds:sodium chloride 0.9%, sodium chloride 0.9%, albuterol-ipratropium, dextrose 10 % in water (D10W), dextrose 10%, dextrose 10%, glucagon (human recombinant), glucose, glucose,  hydrALAZINE, HYDROmorphone, insulin aspart U-100, labetalol, magnesium sulfate IVPB, naloxone, ondansetron, oxyCODONE, oxyCODONE, prochlorperazine, sodium chloride 0.9%, sodium chloride 0.9%, sodium chloride 0.9%, sodium phosphate 20.01 mmol in dextrose 5 % (D5W) 250 mL IVPB, sodium phosphate 30 mmol in dextrose 5 % (D5W) 250 mL IVPB, sodium phosphate 39.99 mmol in dextrose 5 % (D5W) 250 mL IVPB     Review of patient's allergies indicates:  No Known Allergies  Objective:     Vital Signs (Most Recent):  Temp: 98.3 °F (36.8 °C) (02/07/24 0723)  Pulse: 95 (02/07/24 0800)  Resp: 16 (02/07/24 0800)  BP: (!) 153/72 (02/07/24 0849)  SpO2: 100 % (02/07/24 0800) Vital Signs (24h Range):  Temp:  [97.3 °F (36.3 °C)-99.5 °F (37.5 °C)] 98.3 °F (36.8 °C)  Pulse:  [] 95  Resp:  [2-30] 16  SpO2:  [95 %-100 %] 100 %  BP: (114-197)/(57-95) 153/72  Arterial Line BP: (114-205)/(54-91) 138/65     Weight: (!) 154.2 kg (340 lb)  Body mass index is 56.58 kg/m².    Intake/Output - Last 3 Shifts         02/05 0700 02/06 0659 02/06 0700 02/07 0659 02/07 0700 02/08 0659    I.V. (mL/kg) 150 (0.9) 83.8 (0.5) 11.6 (0.1)    Other 750 600     NG/ 360 60    IV Piggyback 1834.9 297.9     Total Intake(mL/kg) 3034.9 (18.7) 1341.6 (8.7) 71.6 (0.5)    Urine (mL/kg/hr) 20 (0) 105 (0) 0 (0)    Drains       Other 2738 2400     Stool 70 100     Total Output 2828 2605 0    Net +206.9 -1263.4 +71.6                    Physical Exam  Vitals reviewed.   Constitutional:       General: She is not in acute distress.     Appearance: She is obese. She is ill-appearing.   HENT:      Head: Normocephalic.   Neck:      Comments: Right IJ trialysis   Cardiovascular:      Rate and Rhythm: Regular rhythm.      Pulses: Normal pulses.   Pulmonary:      Effort: Pulmonary effort is normal.      Comments: Mechanically ventilated   Abdominal:      Palpations: Abdomen is soft.      Tenderness: There is no abdominal tenderness.   Genitourinary:     Comments: Gretta in  place with clear yellow urine   Musculoskeletal:      Comments: Wound vac in place with adequate seal. Surrounding skin soft edematous, no crepitus    Skin:     General: Skin is warm and dry.   Neurological:      General: No focal deficit present.          Significant Labs:  I have reviewed all pertinent lab results within the past 24 hours.  CBC:   Recent Labs   Lab 02/07/24  0308   WBC 31.47*   RBC 3.17*   HGB 8.4*   HCT 25.7*      MCV 81*   MCH 26.5*   MCHC 32.7     CMP:   Recent Labs   Lab 02/07/24  0308   *  142*   CALCIUM 8.4*  8.4*   ALBUMIN 1.8*  1.8*   PROT 7.3   *  133*   K 4.4  4.4   CO2 15*  15*     104   BUN 31*  31*   CREATININE 3.4*  3.4*   ALKPHOS 153*   ALT 13   AST 32   BILITOT 0.3       Significant Diagnostics:  I have reviewed all pertinent imaging results/findings within the past 24 hours.  Assessment/Plan:     * Ayaz's gangrene  53 y.o. 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     - Last WV change 2/6. Tentatively planning to change in OR 2/9  - Palliative consult   - Daily SBTs  - Okay for DVT ppx   - Remainder of care per SICU        Marisa Fung MD  General Surgery  [facility]

## 2024-02-07 NOTE — ASSESSMENT & PLAN NOTE
53 F Hx obesity currently admitted to surgical ICU as transfer from  for necrotizing fascitis s/p surgical debirdement (1/30/24) and Vascular Neurology consulted for multi territory infarcts as well as abnormal restricted diffusion on L temporal lobe concerning for possible infarct/septic emboli. Patient is currently s/p surgical debridement of R groin nec fasc w + cx proteus as well as s/p wound vac. Gen Neuro originally consulted due to worsening neuro exam on 2/3 and CTH showed  hypoattenuations at left anterior temporal lobe w/encephalomalacia, and bilateral centrum semiovale, left anterior corpus callosum, right caudate, subinsular region. In the interim patient having worsening leukocytosis up to 31K today while on CTX and metronidazole with clear cultures. MRI brain 2/5/24 showed several scattered punctate acute infarcts at BL frontal lobes, centrum semiovale, basal ganglia, corpus callosum, and cerebellar hemispheres as well as focal locunar area of diffusion restriction at L temporal lobe.     Given patient's current clinical scenario with on going infection, worsening leukocytosis concerned for embolic etiology likely septic. TTE completed, unremarkable. Worsening neurological exam (disconjugate gaze, L>R). NIH 30. Stat CTH.     Recommendation:  -JAY to investigate for vegetation/septic emboli.  -LP to rule out vasculitis   -MRI/MRV W & WO to rule out venous infarct. Consider hypercoagulable studies based on results.     Antithrombotics for secondary stroke prevention: Antiplatelets: None: Risk of bleeding and need to rule out mycotic aneurysm     Statins for secondary stroke prevention and hyperlipidemia, if present:   Statins: Atorvastatin- 40 mg daily    Aggressive risk factor modification: HTN, HLD, Obesity, infection      Rehab efforts: The patient has been evaluated by a stroke team provider and the therapy needs have been fully considered based off the presenting complaints and exam findings. The  following therapy evaluations are needed:  when able     Diagnostics ordered/pending: CTA Head to assess vasculature , CTA Neck/Arch to assess vasculature, JAY.      VTE prophylaxis: None: Reason for No Pharmacological VTE Prophylaxis: Bacterial endocarditis concern     BP parameters: Infarct: normotension

## 2024-02-07 NOTE — ASSESSMENT & PLAN NOTE
53 y.o. 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     - Last WV change 2/6. Tentatively planning to change in OR 2/9  - Palliative consult   - Daily SBTs  - Okay for DVT ppx   - Remainder of care per SICU

## 2024-02-08 ENCOUNTER — ANESTHESIA EVENT (OUTPATIENT)
Dept: SURGERY | Facility: HOSPITAL | Age: 54
DRG: 004 | End: 2024-02-08
Payer: MEDICAID

## 2024-02-08 PROBLEM — E88.09 HYPOALBUMINEMIA: Status: ACTIVE | Noted: 2024-02-08

## 2024-02-08 LAB
ALBUMIN SERPL BCP-MCNC: 1.7 G/DL (ref 3.5–5.2)
ALP SERPL-CCNC: 160 U/L (ref 55–135)
ALT SERPL W/O P-5'-P-CCNC: 11 U/L (ref 10–44)
ANION GAP SERPL CALC-SCNC: 13 MMOL/L (ref 8–16)
ANISOCYTOSIS BLD QL SMEAR: SLIGHT
AST SERPL-CCNC: 33 U/L (ref 10–40)
BASOPHILS # BLD AUTO: ABNORMAL K/UL (ref 0–0.2)
BASOPHILS NFR BLD: 0 % (ref 0–1.9)
BILIRUB SERPL-MCNC: 0.2 MG/DL (ref 0.1–1)
BUN SERPL-MCNC: 49 MG/DL (ref 6–20)
CALCIUM SERPL-MCNC: 8.2 MG/DL (ref 8.7–10.5)
CHLORIDE SERPL-SCNC: 105 MMOL/L (ref 95–110)
CO2 SERPL-SCNC: 16 MMOL/L (ref 23–29)
CREAT SERPL-MCNC: 4.7 MG/DL (ref 0.5–1.4)
DIFFERENTIAL METHOD BLD: ABNORMAL
EOSINOPHIL # BLD AUTO: ABNORMAL K/UL (ref 0–0.5)
EOSINOPHIL NFR BLD: 0 % (ref 0–8)
ERYTHROCYTE [DISTWIDTH] IN BLOOD BY AUTOMATED COUNT: 17.2 % (ref 11.5–14.5)
EST. GFR  (NO RACE VARIABLE): 10.5 ML/MIN/1.73 M^2
GLUCOSE SERPL-MCNC: 162 MG/DL (ref 70–110)
HCT VFR BLD AUTO: 22.8 % (ref 37–48.5)
HGB BLD-MCNC: 7.3 G/DL (ref 12–16)
HSV-1 DNA BY PCR: NEGATIVE
HSV-2 DNA BY PCR: NEGATIVE
HYPOCHROMIA BLD QL SMEAR: ABNORMAL
IMM GRANULOCYTES # BLD AUTO: ABNORMAL K/UL (ref 0–0.04)
IMM GRANULOCYTES NFR BLD AUTO: ABNORMAL % (ref 0–0.5)
LYMPHOCYTES # BLD AUTO: ABNORMAL K/UL (ref 1–4.8)
LYMPHOCYTES NFR BLD: 7 % (ref 18–48)
MAGNESIUM SERPL-MCNC: 2.3 MG/DL (ref 1.6–2.6)
MCH RBC QN AUTO: 26.9 PG (ref 27–31)
MCHC RBC AUTO-ENTMCNC: 32 G/DL (ref 32–36)
MCV RBC AUTO: 84 FL (ref 82–98)
METAMYELOCYTES NFR BLD MANUAL: 1 %
MONOCYTES # BLD AUTO: ABNORMAL K/UL (ref 0.3–1)
MONOCYTES NFR BLD: 8 % (ref 4–15)
NEUTROPHILS NFR BLD: 82 % (ref 38–73)
NEUTS BAND NFR BLD MANUAL: 2 %
NRBC BLD-RTO: 0 /100 WBC
OVALOCYTES BLD QL SMEAR: ABNORMAL
PHOSPHATE SERPL-MCNC: 7.9 MG/DL (ref 2.7–4.5)
PLATELET # BLD AUTO: 342 K/UL (ref 150–450)
PLATELET BLD QL SMEAR: ABNORMAL
PMV BLD AUTO: 11.1 FL (ref 9.2–12.9)
POCT GLUCOSE: 140 MG/DL (ref 70–110)
POCT GLUCOSE: 149 MG/DL (ref 70–110)
POCT GLUCOSE: 169 MG/DL (ref 70–110)
POCT GLUCOSE: 182 MG/DL (ref 70–110)
POCT GLUCOSE: 184 MG/DL (ref 70–110)
POCT GLUCOSE: 191 MG/DL (ref 70–110)
POCT GLUCOSE: 216 MG/DL (ref 70–110)
POIKILOCYTOSIS BLD QL SMEAR: SLIGHT
POLYCHROMASIA BLD QL SMEAR: ABNORMAL
POTASSIUM SERPL-SCNC: 4.4 MMOL/L (ref 3.5–5.1)
PROT SERPL-MCNC: 7 G/DL (ref 6–8.4)
RBC # BLD AUTO: 2.71 M/UL (ref 4–5.4)
SODIUM SERPL-SCNC: 134 MMOL/L (ref 136–145)
TARGETS BLD QL SMEAR: ABNORMAL
WBC # BLD AUTO: 20.99 K/UL (ref 3.9–12.7)

## 2024-02-08 PROCEDURE — 63600175 PHARM REV CODE 636 W HCPCS: Performed by: HOSPITALIST

## 2024-02-08 PROCEDURE — 25000003 PHARM REV CODE 250: Performed by: NURSE PRACTITIONER

## 2024-02-08 PROCEDURE — 20000000 HC ICU ROOM

## 2024-02-08 PROCEDURE — 25000003 PHARM REV CODE 250

## 2024-02-08 PROCEDURE — 99233 SBSQ HOSP IP/OBS HIGH 50: CPT | Mod: ,,, | Performed by: INTERNAL MEDICINE

## 2024-02-08 PROCEDURE — 99900035 HC TECH TIME PER 15 MIN (STAT)

## 2024-02-08 PROCEDURE — 99291 CRITICAL CARE FIRST HOUR: CPT | Mod: 24,,, | Performed by: STUDENT IN AN ORGANIZED HEALTH CARE EDUCATION/TRAINING PROGRAM

## 2024-02-08 PROCEDURE — 83735 ASSAY OF MAGNESIUM: CPT

## 2024-02-08 PROCEDURE — 84100 ASSAY OF PHOSPHORUS: CPT

## 2024-02-08 PROCEDURE — 94761 N-INVAS EAR/PLS OXIMETRY MLT: CPT

## 2024-02-08 PROCEDURE — 99232 SBSQ HOSP IP/OBS MODERATE 35: CPT | Mod: ,,, | Performed by: NURSE PRACTITIONER

## 2024-02-08 PROCEDURE — 85027 COMPLETE CBC AUTOMATED: CPT

## 2024-02-08 PROCEDURE — 99900026 HC AIRWAY MAINTENANCE (STAT)

## 2024-02-08 PROCEDURE — S5010 5% DEXTROSE AND 0.45% SALINE: HCPCS | Performed by: STUDENT IN AN ORGANIZED HEALTH CARE EDUCATION/TRAINING PROGRAM

## 2024-02-08 PROCEDURE — 25000003 PHARM REV CODE 250: Performed by: STUDENT IN AN ORGANIZED HEALTH CARE EDUCATION/TRAINING PROGRAM

## 2024-02-08 PROCEDURE — 99232 SBSQ HOSP IP/OBS MODERATE 35: CPT | Mod: ,,, | Performed by: PHYSICIAN ASSISTANT

## 2024-02-08 PROCEDURE — 63600175 PHARM REV CODE 636 W HCPCS: Mod: JZ,JG | Performed by: INTERNAL MEDICINE

## 2024-02-08 PROCEDURE — 27000923 HC TRIALYSIS CATHETER, ANY SIZE

## 2024-02-08 PROCEDURE — 27100171 HC OXYGEN HIGH FLOW UP TO 24 HOURS

## 2024-02-08 PROCEDURE — 25000242 PHARM REV CODE 250 ALT 637 W/ HCPCS

## 2024-02-08 PROCEDURE — 85007 BL SMEAR W/DIFF WBC COUNT: CPT

## 2024-02-08 PROCEDURE — 94003 VENT MGMT INPAT SUBQ DAY: CPT

## 2024-02-08 PROCEDURE — 80053 COMPREHEN METABOLIC PANEL: CPT

## 2024-02-08 PROCEDURE — 63600175 PHARM REV CODE 636 W HCPCS: Mod: JZ,JG

## 2024-02-08 PROCEDURE — 27200966 HC CLOSED SUCTION SYSTEM

## 2024-02-08 RX ORDER — SODIUM CHLORIDE 9 MG/ML
INJECTION, SOLUTION INTRAVENOUS ONCE
Status: DISCONTINUED | OUTPATIENT
Start: 2024-02-08 | End: 2024-02-20

## 2024-02-08 RX ORDER — CARVEDILOL 6.25 MG/1
6.25 TABLET ORAL 2 TIMES DAILY
Status: DISCONTINUED | OUTPATIENT
Start: 2024-02-08 | End: 2024-02-09

## 2024-02-08 RX ORDER — SEVELAMER CARBONATE FOR ORAL SUSPENSION 800 MG/1
1.6 POWDER, FOR SUSPENSION ORAL
Status: DISCONTINUED | OUTPATIENT
Start: 2024-02-08 | End: 2024-02-09

## 2024-02-08 RX ORDER — DEXTROSE MONOHYDRATE AND SODIUM CHLORIDE 5; .45 G/100ML; G/100ML
INJECTION, SOLUTION INTRAVENOUS CONTINUOUS
Status: DISCONTINUED | OUTPATIENT
Start: 2024-02-08 | End: 2024-02-10

## 2024-02-08 RX ADMIN — ACETAMINOPHEN 650 MG: 325 TABLET ORAL at 06:02

## 2024-02-08 RX ADMIN — HEPARIN SODIUM 7500 UNITS: 5000 INJECTION INTRAVENOUS; SUBCUTANEOUS at 12:02

## 2024-02-08 RX ADMIN — CARVEDILOL 6.25 MG: 6.25 TABLET, FILM COATED ORAL at 08:02

## 2024-02-08 RX ADMIN — PSYLLIUM HUSK 1 PACKET: 3.4 POWDER ORAL at 08:02

## 2024-02-08 RX ADMIN — ACETAMINOPHEN 650 MG: 325 TABLET ORAL at 12:02

## 2024-02-08 RX ADMIN — INSULIN ASPART 10 UNITS: 100 INJECTION, SOLUTION INTRAVENOUS; SUBCUTANEOUS at 11:02

## 2024-02-08 RX ADMIN — ACETAMINOPHEN 650 MG: 325 TABLET ORAL at 05:02

## 2024-02-08 RX ADMIN — AMLODIPINE BESYLATE 10 MG: 10 TABLET ORAL at 08:02

## 2024-02-08 RX ADMIN — INSULIN ASPART 2 UNITS: 100 INJECTION, SOLUTION INTRAVENOUS; SUBCUTANEOUS at 12:02

## 2024-02-08 RX ADMIN — DEXTROSE AND SODIUM CHLORIDE: 5; 450 INJECTION, SOLUTION INTRAVENOUS at 09:02

## 2024-02-08 RX ADMIN — CEFTRIAXONE 2 G: 2 INJECTION, POWDER, FOR SOLUTION INTRAMUSCULAR; INTRAVENOUS at 09:02

## 2024-02-08 RX ADMIN — LABETALOL HYDROCHLORIDE 10 MG: 5 INJECTION, SOLUTION INTRAVENOUS at 04:02

## 2024-02-08 RX ADMIN — SEVELAMER CARBONATE 1.6 G: 800 POWDER, FOR SUSPENSION ORAL at 12:02

## 2024-02-08 RX ADMIN — HEPARIN SODIUM 7500 UNITS: 5000 INJECTION INTRAVENOUS; SUBCUTANEOUS at 06:02

## 2024-02-08 RX ADMIN — INSULIN ASPART 10 UNITS: 100 INJECTION, SOLUTION INTRAVENOUS; SUBCUTANEOUS at 08:02

## 2024-02-08 RX ADMIN — HYDRALAZINE HYDROCHLORIDE 10 MG: 20 INJECTION, SOLUTION INTRAMUSCULAR; INTRAVENOUS at 03:02

## 2024-02-08 RX ADMIN — HEPARIN SODIUM 7500 UNITS: 5000 INJECTION INTRAVENOUS; SUBCUTANEOUS at 10:02

## 2024-02-08 RX ADMIN — INSULIN ASPART 10 UNITS: 100 INJECTION, SOLUTION INTRAVENOUS; SUBCUTANEOUS at 04:02

## 2024-02-08 RX ADMIN — METRONIDAZOLE 500 MG: 5 INJECTION, SOLUTION INTRAVENOUS at 10:02

## 2024-02-08 RX ADMIN — METRONIDAZOLE 500 MG: 5 INJECTION, SOLUTION INTRAVENOUS at 01:02

## 2024-02-08 RX ADMIN — SEVELAMER CARBONATE 1.6 G: 800 POWDER, FOR SUSPENSION ORAL at 06:02

## 2024-02-08 RX ADMIN — FAMOTIDINE 20 MG: 20 TABLET, FILM COATED ORAL at 08:02

## 2024-02-08 RX ADMIN — PSYLLIUM HUSK 1 PACKET: 3.4 POWDER ORAL at 09:02

## 2024-02-08 RX ADMIN — INSULIN ASPART 1 UNITS: 100 INJECTION, SOLUTION INTRAVENOUS; SUBCUTANEOUS at 08:02

## 2024-02-08 RX ADMIN — METRONIDAZOLE 500 MG: 5 INJECTION, SOLUTION INTRAVENOUS at 06:02

## 2024-02-08 RX ADMIN — INSULIN ASPART 1 UNITS: 100 INJECTION, SOLUTION INTRAVENOUS; SUBCUTANEOUS at 04:02

## 2024-02-08 RX ADMIN — INSULIN ASPART 10 UNITS: 100 INJECTION, SOLUTION INTRAVENOUS; SUBCUTANEOUS at 12:02

## 2024-02-08 RX ADMIN — INSULIN ASPART 2 UNITS: 100 INJECTION, SOLUTION INTRAVENOUS; SUBCUTANEOUS at 04:02

## 2024-02-08 RX ADMIN — HEPARIN SODIUM 7500 UNITS: 5000 INJECTION INTRAVENOUS; SUBCUTANEOUS at 01:02

## 2024-02-08 NOTE — ASSESSMENT & PLAN NOTE
53 y.o. 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     - Last WV change 2/6. Planning to change in OR tomorrow, 2/9; will obtain consent - please hold TF at midnight  - Palliative following given overall poor prognosis, daughter would like everything done   - Daily SBTs  - Okay for DVT ppx   - Remainder of care per SICU

## 2024-02-08 NOTE — ASSESSMENT & PLAN NOTE
BG goal 140-180    No previous hx of T2DM per family at bedside. A1c of 10.4. DKA at OSH. On TF being held for JAY.    Plan:  - Continue Transition IV insulin infusion at 0.8 u/hr with stepdown parameters (decreased per protocol)   -Continue Novolog 10 units q 4 hrs while on tube feeding (HOLD if tube feeding is stopped or BG < 100)   -Continue Moderate Dose Correction Scale  -BG monitoring q 4 hrs while NPO     ** Please call Endocrine for any BG related issues **      Discharge plans: TBD    Lab Results   Component Value Date    HGBA1C 10.4 (H) 01/30/2024

## 2024-02-08 NOTE — CARE UPDATE
Patient's chart was reviewed by a stroke team provider and discussed with staff.     Briefly, 54 y/o admitted with necrotizing fascitis s/p surgical debridement who developed encephalopathy following her procedure. MRI brain showed multifocal areas of restricted diffusion in both hemispheres involving multiple vascular territories with additional larger left anterior temporal focus w/ vasogenic edema.     Differential includes septic embolic vs inflammatory vs hypercoagulopathy     TTE showed no vegetations. However, with a persistent leukocytosis, IE remains a concern. Recommend JAY and potentially an MRI of the brain W/ contrast (to evaluate for venous thrombosis).     Stroke team will continue to follow on consulting service. Please do not hesitate to contact the stroke team for any questions or concerns.

## 2024-02-08 NOTE — SUBJECTIVE & OBJECTIVE
Interval History: No acute events overnight. Tube feeds held in anticipation of TTE today to look for source of septic emboli. HDS.     Medications:  Continuous Infusions:   dextrose 10 % in water (D10W)      dextrose 5 % and 0.45 % NaCl      insulin regular 1 units/mL infusion orderable (TRANSFER) 0.7 Units/hr (02/08/24 0600)     Scheduled Meds:   acetaminophen  650 mg Per OG tube Q6H    amLODIPine  10 mg Per OG tube Daily    carvediloL  6.25 mg Per OG tube BID    cefTRIAXone (Rocephin) IV (PEDS and ADULTS)  2 g Intravenous Q24H    famotidine  20 mg Per OG tube Daily    heparin (porcine)  7,500 Units Subcutaneous Q8H    insulin aspart U-100  10 Units Subcutaneous Q4H    metronidazole  500 mg Intravenous Q8H    psyllium husk (aspartame)  1 packet Per OG tube BID     PRN Meds:sodium chloride 0.9%, albuterol-ipratropium, dextrose 10 % in water (D10W), dextrose 10%, dextrose 10%, glucagon (human recombinant), glucose, glucose, hydrALAZINE, HYDROmorphone, insulin aspart U-100, labetalol, naloxone, ondansetron, oxyCODONE, oxyCODONE, prochlorperazine, sodium chloride 0.9%, sodium chloride 0.9%, sodium chloride 0.9%     Review of patient's allergies indicates:  No Known Allergies  Objective:     Vital Signs (Most Recent):  Temp: 98.6 °F (37 °C) (02/08/24 0330)  Pulse: 91 (02/08/24 0800)  Resp: 18 (02/08/24 0800)  BP: (!) 178/81 (02/08/24 0814)  SpO2: 100 % (02/08/24 0800) Vital Signs (24h Range):  Temp:  [98.1 °F (36.7 °C)-98.6 °F (37 °C)] 98.6 °F (37 °C)  Pulse:  [] 91  Resp:  [13-27] 18  SpO2:  [97 %-100 %] 100 %  BP: (127-178)/(61-93) 178/81  Arterial Line BP: (127-181)/(59-89) 165/67     Weight: (!) 153.3 kg (338 lb)  Body mass index is 56.25 kg/m².    Intake/Output - Last 3 Shifts         02/06 0700 02/07 0659 02/07 0700 02/08 0659 02/08 0700 02/09 0659    I.V. (mL/kg) 83.8 (0.5) 113.7 (0.7)     Other 600      NG/ 625     IV Piggyback 297.9 480.5     Total Intake(mL/kg) 1341.6 (8.7) 1219.2 (8)     Urine  (mL/kg/hr) 105 (0) 245 (0.1) 40 (0.2)    Other 2400 200     Stool 100 0     Total Output 2605 445 40    Net -1263.4 +774.2 -40           Stool Occurrence  1 x              Physical Exam  Cardiovascular:      Rate and Rhythm: Normal rate.   Pulmonary:      Comments: Intubated  Vent Mode: Spont  Oxygen Concentration (%):  [40] 40  Resp Rate Total:  [14 br/min-40 br/min] 19 br/min  PEEP/CPAP:  [5 cmH20] 5 cmH20  Pressure Support:  [10 cmH20] 10 cmH20  Mean Airway Pressure:  [7.8 ccA72-23 cmH20] 8.3 cmH20      Abdominal:      General: There is no distension.      Palpations: Abdomen is soft.      Tenderness: There is no abdominal tenderness.   Skin:     General: Skin is warm and dry.      Capillary Refill: Capillary refill takes less than 2 seconds.      Comments: Wound vac in place to R thigh/perineum to good suction   Neurological:      Comments: GCS 7T          Significant Labs:  I have reviewed all pertinent lab results within the past 24 hours.  CBC:   Recent Labs   Lab 02/08/24  0302   WBC 20.99*   RBC 2.71*   HGB 7.3*   HCT 22.8*      MCV 84   MCH 26.9*   MCHC 32.0     BMP:   Recent Labs   Lab 02/08/24  0302   *   *   K 4.4      CO2 16*   BUN 49*   CREATININE 4.7*   CALCIUM 8.2*   MG 2.3       Significant Diagnostics:  I have reviewed all pertinent imaging results/findings within the past 24 hours.

## 2024-02-08 NOTE — PLAN OF CARE
"      SICU PLAN OF CARE NOTE    Dx: Ayaz's gangrene    Shift Events: No acute event overnight. Repeated CT head, no result at this time. Neuro status remained the same. Minimal output for wound vac and rectal tube. BP elevated, PRN meds given per order.     Goals of Care: Possible LP, JAY this AM. TF held per SICU team due to JAY this AM, endocrine notified, stated okay to continue insulin gtt but may hold q4h insulin subq.    Neuro: Open eyes to pain stimulation     Vital Signs: BP (!) 157/70   Pulse 92   Temp 98.6 °F (37 °C) (Axillary)   Resp 17   Ht 5' 5" (1.651 m)   Wt (!) 153.3 kg (338 lb)   LMP 01/01/2024 (Approximate) Comment: tubal ligation  SpO2 99%   BMI 56.25 kg/m²     Cardiac: SR-ST, SBP > 170    Respiratory: Ventilator    Diet: NPO    Gtts: Insulin    Urine Output: Urinary Catheter 185 cc/shift    Drains: Wound Pouch, total output 100 cc /  shift     Labs/Accuchecks: q4h.    Skin: Wound care provided, no new skin issue, turned q2h.      "

## 2024-02-08 NOTE — PROGRESS NOTES
[facility]  General Surgery  Progress Note    Subjective:     History of Present Illness:  53 year old morbidly obese female who does not routinely go to the doctor presents to the ED with malaise, nausea, vomiting, and groin, buttock, perineal swelling and tenderness. She began feeling ill a few days ago.     On arrival to the ED she is tachycardic to 120, hypertensive without fever. Labs demonstrate leukocytosis of 21, , with lactic acidosis and associated ALVARADO with cr 3.8, hyperglycemia with blood glucose of 824 accompanied by severe electrolyte derangements. CT imaging obtained demonstrates diffuse subcutaneous air along buttocks, perineum, anterior vulva, as well as bilateral thighs.     No prior abdominal surgeries. She denies problems with her heart or lungs. Non smoker. Does not routinely take any medications.    Post-Op Info:  Procedure(s) (LRB):  DEBRIDEMENT, WOUND, replace wound vac, possible closure (Right)   2 Days Post-Op     Interval History: No acute events overnight. Tube feeds held in anticipation of TTE today to look for source of septic emboli. HDS.     Medications:  Continuous Infusions:   dextrose 10 % in water (D10W)      dextrose 5 % and 0.45 % NaCl      insulin regular 1 units/mL infusion orderable (TRANSFER) 0.7 Units/hr (02/08/24 0600)     Scheduled Meds:   acetaminophen  650 mg Per OG tube Q6H    amLODIPine  10 mg Per OG tube Daily    carvediloL  6.25 mg Per OG tube BID    cefTRIAXone (Rocephin) IV (PEDS and ADULTS)  2 g Intravenous Q24H    famotidine  20 mg Per OG tube Daily    heparin (porcine)  7,500 Units Subcutaneous Q8H    insulin aspart U-100  10 Units Subcutaneous Q4H    metronidazole  500 mg Intravenous Q8H    psyllium husk (aspartame)  1 packet Per OG tube BID     PRN Meds:sodium chloride 0.9%, albuterol-ipratropium, dextrose 10 % in water (D10W), dextrose 10%, dextrose 10%, glucagon (human recombinant), glucose, glucose, hydrALAZINE, HYDROmorphone,  insulin aspart U-100, labetalol, naloxone, ondansetron, oxyCODONE, oxyCODONE, prochlorperazine, sodium chloride 0.9%, sodium chloride 0.9%, sodium chloride 0.9%     Review of patient's allergies indicates:  No Known Allergies  Objective:     Vital Signs (Most Recent):  Temp: 98.6 °F (37 °C) (02/08/24 0330)  Pulse: 91 (02/08/24 0800)  Resp: 18 (02/08/24 0800)  BP: (!) 178/81 (02/08/24 0814)  SpO2: 100 % (02/08/24 0800) Vital Signs (24h Range):  Temp:  [98.1 °F (36.7 °C)-98.6 °F (37 °C)] 98.6 °F (37 °C)  Pulse:  [] 91  Resp:  [13-27] 18  SpO2:  [97 %-100 %] 100 %  BP: (127-178)/(61-93) 178/81  Arterial Line BP: (127-181)/(59-89) 165/67     Weight: (!) 153.3 kg (338 lb)  Body mass index is 56.25 kg/m².    Intake/Output - Last 3 Shifts         02/06 0700  02/07 0659 02/07 0700 02/08 0659 02/08 0700 02/09 0659    I.V. (mL/kg) 83.8 (0.5) 113.7 (0.7)     Other 600      NG/ 625     IV Piggyback 297.9 480.5     Total Intake(mL/kg) 1341.6 (8.7) 1219.2 (8)     Urine (mL/kg/hr) 105 (0) 245 (0.1) 40 (0.2)    Other 2400 200     Stool 100 0     Total Output 2605 445 40    Net -1263.4 +774.2 -40           Stool Occurrence  1 x              Physical Exam  Cardiovascular:      Rate and Rhythm: Normal rate.   Pulmonary:      Comments: Intubated  Vent Mode: Spont  Oxygen Concentration (%):  [40] 40  Resp Rate Total:  [14 br/min-40 br/min] 19 br/min  PEEP/CPAP:  [5 cmH20] 5 cmH20  Pressure Support:  [10 cmH20] 10 cmH20  Mean Airway Pressure:  [7.8 bdD40-67 cmH20] 8.3 cmH20      Abdominal:      General: There is no distension.      Palpations: Abdomen is soft.      Tenderness: There is no abdominal tenderness.   Skin:     General: Skin is warm and dry.      Capillary Refill: Capillary refill takes less than 2 seconds.      Comments: Wound vac in place to R thigh/perineum to good suction   Neurological:      Comments: GCS 7T          Significant Labs:  I have reviewed all pertinent lab results within the past 24 hours.  CBC:    Recent Labs   Lab 02/08/24  0302   WBC 20.99*   RBC 2.71*   HGB 7.3*   HCT 22.8*      MCV 84   MCH 26.9*   MCHC 32.0     BMP:   Recent Labs   Lab 02/08/24 0302   *   *   K 4.4      CO2 16*   BUN 49*   CREATININE 4.7*   CALCIUM 8.2*   MG 2.3       Significant Diagnostics:  I have reviewed all pertinent imaging results/findings within the past 24 hours.  Assessment/Plan:     * Ayaz's gangrene  53 y.o. 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     - Last WV change 2/6. Planning to change in OR tomorrow, 2/9; will obtain consent - please hold TF at midnight  - Palliative following given overall poor prognosis, daughter would like everything done   - Daily SBTs  - Okay for DVT ppx   - Remainder of care per SICU        Beatris Parks MD  General Surgery  [facility]

## 2024-02-08 NOTE — SUBJECTIVE & OBJECTIVE
Interval History/Significant Events: NAEON. NPO midnight for JAY. Patient does not withdraw to pain and has no output through rectal tube. Pupils are round and reactive    Follow-up For: Procedure(s) (LRB):  DEBRIDEMENT, WOUND, replace wound vac, possible closure (Right)    Post-Operative Day: 2 Days Post-Op    Objective:     Vital Signs (Most Recent):  Temp: 98.6 °F (37 °C) (02/08/24 0330)  Pulse: 92 (02/08/24 0630)  Resp: 17 (02/08/24 0630)  BP: (!) 157/70 (02/08/24 0600)  SpO2: 99 % (02/08/24 0630) Vital Signs (24h Range):  Temp:  [98.1 °F (36.7 °C)-98.6 °F (37 °C)] 98.6 °F (37 °C)  Pulse:  [] 92  Resp:  [13-27] 17  SpO2:  [97 %-100 %] 99 %  BP: (124-173)/(61-93) 157/70  Arterial Line BP: (123-181)/(59-89) 148/59     Weight: (!) 153.3 kg (338 lb)  Body mass index is 56.25 kg/m².      Intake/Output Summary (Last 24 hours) at 2/8/2024 0722  Last data filed at 2/8/2024 0600  Gross per 24 hour   Intake 1183.31 ml   Output 445 ml   Net 738.31 ml          Physical Exam  Vitals reviewed.   Constitutional:       Appearance: She is obese.   HENT:      Head: Normocephalic.      Right Ear: Tympanic membrane normal.      Nose: Nose normal.      Mouth/Throat:      Mouth: Mucous membranes are moist.   Eyes:      Pupils: Pupils are equal, round, and reactive to light.      Comments: Eyes are midline without deviation   Cardiovascular:      Rate and Rhythm: Normal rate and regular rhythm.   Pulmonary:      Effort: Pulmonary effort is normal.      Breath sounds: Normal breath sounds.      Comments: Intubated on vent  Abdominal:      General: Abdomen is flat.      Palpations: Abdomen is soft.   Genitourinary:     Comments: Rectal tube and meza  Musculoskeletal:         General: Normal range of motion.   Skin:     General: Skin is warm.      Capillary Refill: Capillary refill takes less than 2 seconds.   Neurological:      Comments: Does not respond to stimuli or pain.    Psychiatric:         Mood and Affect: Mood normal.             Vents:  Vent Mode: Spont (02/08/24 0419)  Set Rate: 18 BPM (02/06/24 0349)  Vt Set: 420 mL (02/06/24 0349)  Pressure Support: 10 cmH20 (02/08/24 0419)  PEEP/CPAP: 5 cmH20 (02/08/24 0419)  Oxygen Concentration (%): 40 (02/08/24 0630)  Peak Airway Pressure: 16 cmH20 (02/08/24 0419)  Plateau Pressure: 15 cmH20 (02/08/24 0419)  Total Ve: 9.38 L/m (02/08/24 0419)  Negative Inspiratory Force (cm H2O): 0 (02/08/24 0419)  F/VT Ratio<105 (RSBI): (!) 24.47 (02/08/24 0419)    Lines/Drains/Airways       Central Venous Catheter Line  Duration             Trialysis (Dialysis) Catheter 02/06/24 1345 right internal jugular 1 day              Drain  Duration                  Rectal Tube 02/04/24 1545 3 days         NG/OG Tube 02/06/24 1030 Center mouth 1 day         Urethral Catheter 02/06/24 1900 1 day              Airway  Duration                  Airway - Non-Surgical 01/31/24 1020 7 days              Arterial Line  Duration             Arterial Line 01/31/24 1025 Right Radial 7 days              Peripheral Intravenous Line  Duration                  Peripheral IV - Single Lumen 02/04/24 2241 20 G Right Antecubital 3 days         Peripheral IV - Single Lumen 02/08/24 0152 18 G Anterior;Left Forearm <1 day                    Significant Labs:    CBC/Anemia Profile:  Recent Labs   Lab 02/07/24 0308 02/08/24  0302   WBC 31.47* 20.99*   HGB 8.4* 7.3*   HCT 25.7* 22.8*    342   MCV 81* 84   RDW 15.6* 17.2*        Chemistries:  Recent Labs   Lab 02/06/24 2228 02/07/24  0308 02/08/24  0302   * 133*  133* 134*   K 4.4 4.4  4.4 4.4    104  104 105   CO2 18* 15*  15* 16*   BUN 30* 31*  31* 49*   CREATININE 3.1* 3.4*  3.4* 4.7*   CALCIUM 8.5* 8.4*  8.4* 8.2*   ALBUMIN 1.8* 1.8*  1.8* 1.7*   PROT  --  7.3 7.0   BILITOT  --  0.3 0.2   ALKPHOS  --  153* 160*   ALT  --  13 11   AST  --  32 33   MG 2.2 2.2  2.2 2.3   PHOS 4.1 5.2*  5.2* 7.9*           Significant Imaging:  CT completed and read pending.  JAY pending.

## 2024-02-08 NOTE — SUBJECTIVE & OBJECTIVE
Interval History: ".  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 Taken to OR on 2/2 for debridement of the right buttocks and groin (#3). Evaluated by Neurology on 2/3 for encephalopathy. CT head with remote infarcts. Not withdrawing to pain on the morning of 2/5.    MRI done on 2/6 with embolic infarcts. S/P wound VAC exchange on 2/6. MRI brain revealed multiple embolic infarcts. Awaiting JAY.    Review of Systems   Unable to perform ROS: Intubated     Objective:     Vital Signs (Most Recent):  Temp: 98.1 °F (36.7 °C) (02/08/24 0800)  Pulse: 87 (02/08/24 1506)  Resp: 18 (02/08/24 1506)  BP: (!) 174/74 (02/08/24 1506)  SpO2: 99 % (02/08/24 1506) Vital Signs (24h Range):  Temp:  [98.1 °F (36.7 °C)-98.6 °F (37 °C)] 98.1 °F (36.7 °C)  Pulse:  [] 87  Resp:  [13-27] 18  SpO2:  [97 %-100 %] 99 %  BP: (139-178)/(63-93) 174/74  Arterial Line BP: (130-181)/(58-89) 149/64     Weight: (!) 153.3 kg (338 lb)  Body mass index is 56.25 kg/m².    Estimated Creatinine Clearance: 20.9 mL/min (A) (based on SCr of 4.7 mg/dL (H)).     Physical Exam  Vitals and nursing note reviewed.   Constitutional:       Appearance: She is well-developed. She is ill-appearing.      Interventions: She is intubated.   HENT:      Head: Normocephalic and atraumatic.      Right Ear: External ear normal.      Left Ear: External ear normal.   Eyes:      General: No scleral icterus.        Right eye: No discharge.         Left eye: No discharge.      Conjunctiva/sclera: Conjunctivae normal.   Cardiovascular:      Rate and Rhythm: Normal rate and regular rhythm.      Heart sounds: Normal heart sounds. No murmur heard.     No friction rub. No gallop.   Pulmonary:      Effort: Pulmonary effort is normal. No respiratory distress. She is intubated.      Breath sounds: No stridor. Rhonchi present. No wheezing or rales.   Abdominal:      General: Bowel sounds are normal.   Musculoskeletal:          General: No tenderness.   Skin:     General: Skin is warm and dry.      Comments: Wound VAC on right groin area   Neurological:      Mental Status: She is lethargic.      Comments: Opens eyes and shakes legs however not purposeful. Does not follow commands.          Significant Labs: BMP:   Recent Labs   Lab 02/08/24 0302   *   *   K 4.4      CO2 16*   BUN 49*   CREATININE 4.7*   CALCIUM 8.2*   MG 2.3       CBC:   Recent Labs   Lab 02/07/24 0308 02/08/24 0302   WBC 31.47* 20.99*   HGB 8.4* 7.3*   HCT 25.7* 22.8*    342       Microbiology Results (last 7 days)       Procedure Component Value Units Date/Time    Culture, Anaerobe [[id number]]  (Abnormal) Collected: 01/30/24 0228    Order Status: Completed Specimen: Wound from Groin Updated: 02/07/24 0748     Anaerobic Culture No anaerobes isolated      BACTEROIDES SPECIES  Few      Narrative:      Right groin tissue    Culture, Anaerobe [[id number]] Collected: 01/30/24 0219    Order Status: Completed Specimen: Abscess from Groin Updated: 02/07/24 0748     Anaerobic Culture No anaerobes isolated    Narrative:      Right groin abcess    Blood culture x two cultures. Draw prior to antibiotics. [[id number]] Collected: 01/29/24 2118    Order Status: Completed Specimen: Blood from Peripheral, Antecubital, Left Updated: 02/02/24 2303     Blood Culture, Routine No Growth after 4 days.    Narrative:      Aerobic and anaerobic    Blood culture x two cultures. Draw prior to antibiotics. [[id number]] Collected: 01/29/24 1929    Order Status: Completed Specimen: Blood from Peripheral, Antecubital, Right Updated: 02/02/24 2103     Blood Culture, Routine No Growth after 4 days.    Narrative:      Aerobic and anaerobic    Aerobic culture [[id number]]  (Abnormal)  (Susceptibility) Collected: 01/30/24 0228    Order Status: Completed Specimen: Wound from Groin Updated: 02/02/24 0746     Aerobic Bacterial Culture PROTEUS MIRABILIS  Moderate      Narrative:       Right groin tissue    Aerobic culture [[id number]]  (Abnormal)  (Susceptibility) Collected: 01/30/24 0219    Order Status: Completed Specimen: Abscess from Groin Updated: 02/02/24 0746     Aerobic Bacterial Culture PROTEUS MIRABILIS  Moderate      Narrative:      Right groin abcess            Significant Imaging: I have reviewed all pertinent imaging results/findings within the past 24 hours.

## 2024-02-08 NOTE — CARE UPDATE
I have reviewed the chart of Sarah Saravia and participated in the care of the patient who is hospitalized for the following:    Active Hospital Problems    Diagnosis    *Ayaz's gangrene    Hypoalbuminemia    Cerebral infarction    Encounter for palliative care    Ac isch multi vasc territories stroke    Leukocytosis    Encephalopathy    Encephalopathy, metabolic    Acute hypoxemic respiratory failure    Weaning from mechanically assisted ventilation initiated    Acute blood loss anemia    Acute renal failure with tubular necrosis    New onset type 2 diabetes mellitus    Metabolic acidosis    ALVARADO (acute kidney injury)    Hyponatremia    Diabetic acidosis without coma    Morbid (severe) obesity due to excess calories    Severe sepsis      I have reviewed Sarah Saravia with the multidisciplinary team during rounds.      Bree Lewis, POONAM  Unit-Based [facility]

## 2024-02-08 NOTE — ASSESSMENT & PLAN NOTE
Neuro/Psych:   #Acute Encephalopathy  CTH 2/3 with scattered hypoattenuation likely representing age indeterminate infarcts. EEG findings consistent with moderate-severe encephalopathy. Ddx includes septic embolic vs inflammatory vs hypercoagulopathy.   -- Sedation: None  -- Pain: kermit tylenol, dialudid and oxy prn  -- GCS 7T: E2, V1T, M4; exam stable  -- Neurology following; appreciate recs  -- Neurovascular following; appreciate recs  -- Palliative following; GOC conversation 2/7; appreciate recs  -- MRI 2/6: Several scattered punctate acute infarcts throughout the bilateral frontal lobes, centrum semiovale, basal ganglia, corpus callosum, and cerebellar hemispheres.  Overall distribution is concerning for embolic etiology   -- CTA 2/6: Atherosclerotic plaquing of the carotid bifurcations and proximal ICAs with less than 50% proximal ICA stenosis by NASCET criteria   -- Follow-up [facility] official read             Cards:   -- HDS  -- goal SBP < 180, MAP >65  -- hypertensive, hydralazine and labetalol prns  -- Continue amlodipine 10mg daily; increase coreg 6.25mg BID  -- ECHO 2/6: Unremarkable.   -- [facility] neuro recommend JAY to r/o vegetation.  -- JAY pending      Pulm:   #Acute Respiratory Failure  -- Intubated on spontaneous.  -- Goal O2 sat > 90%  -- CXR stable      Renal:  #ALVARADO on CKD  #ATN  Received HD 2/6 without issue.  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 R groin tissue with proteus, sensitive to ceftriaxone. Growing bacteroids as  well.   -- Abx: ceftriaxone and flagyl  -- ID following ; appreciate recs  -- Debridement and wound vac change 2/7  Recent Labs   Lab 02/06/24  0339 02/07/24  0308 02/08/24  0302   WBC 32.17* 31.47* 20.99*        Heme/Onc:  -- Daily CBC  -- Heparin DVT ppx  Recent Labs   Lab 02/01/24  2246 02/02/24  0335 02/06/24  0339 02/07/24  0308 02/08/24  0302   HGB  --    < > 7.9* 8.4* 7.3*   PLT  --    < > 246 280 342   APTT 27.8  --   --   --   --    INR 1.0  --   --   --   --     < > = values in this interval not displayed.        Endo:   #IDDM  Initially presented to OSH with DKA with latest A1C 10.4 AG Gap resolved  - Placed on insulin gtt 2/3: Transition IV insulin infusion at 1.8 u/hr with stepdown parameters   - Tfs held; SSI HELD as tube feeds are stopped or BG < 100  - q4h BG monitoring while NPO  - Resume Novolog to 10 units units q 4 hrs when tube feeds restarted   - Moderate dose SSI PRN  - Endocrine following, appreciate recs        PPx:   Feeding: NPO  Analgesia/Sedation: See above  Thromboembolic prevention: SQH   HOB >30: Y  Stress Ulcer ppx: Famotidine  Glucose control: Critical care goal 140-180 g/dl, Insulin gtt, kermit novolog, SSI, Endo following  Bowel reg: psyllium  Invasive Lines/Drains/Airway: Glynn, ETT, Art line, Trialysis, PIV x2, Rectal tube      Dispo/Code Status/Palliative:   -- SICU / Full Code

## 2024-02-08 NOTE — PLAN OF CARE
[facility]  Discharge Reassessment    Primary Care Provider: [facility]    Expected Discharge Date: 2/16/2024    Reassessment (most recent)       Discharge Reassessment - 02/08/24 0912          Discharge Reassessment    Assessment Type Discharge Planning Reassessment     Communicated ZEKE with patient/caregiver Date not available/Unable to determine     Discharge Plan A Long-term acute care facility (LTAC)     Discharge Plan B Rehab     DME Needed Upon Discharge  none     Why the patient remains in the hospital Requires continued medical care                     Per MD's Note, Interval History: No acute events overnight. Tube feeds held in anticipation of TTE today to look for source of septic emboli. HDS.         Discharge Plan A and Plan B have been determined by review of patient's clinical status, future medical and therapeutic needs, and coverage/benefits for post-acute care in coordination with multidisciplinary team members.     Jahaira Mckeon LMSW  Case Management [facility] [place], [place]

## 2024-02-08 NOTE — PROGRESS NOTES
"[facility]  Endocrinology  Progress Note    Admit Date: 1/29/2024     Reason for Consult: Management of T2DM, Hyperglycemia     Surgical Procedure and Date: n/a    Diabetes diagnosis year: new onset     Home Diabetes Medications:  none per chart review and family present at bedside     Lab Results   Component Value Date    HGBA1C 10.4 (H) 01/30/2024       Diabetes Complications include:     Hyperglycemia, DKA at OSH     Complicating diabetes co morbidities:   Obesity       HPI:   Patient is a 53 y.o. female with a diagnosis of obesity (BMI 53) admitted with N/V and R groin wound found to be in DKA at OSH. 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 While at OSH pt developed acute respiratory failure requiring intubation. Pulmonology was consulted for ventilator management and critical illness. Nephrology was consulted for worsening vishal in the setting of lactic acidosis and septic shock likely ATN, sCr elevated at 3.8 on admit now 4.0 post HD. Pt being admitted to SICU for surgical critical care management. Immediate plans include hemodynamic stabilization, weaning of respiratory support, and RRT.  Endocrinology consulted for management of T2DM.                 Interval HPI:   No acute events overnight. Patient in room 03414/68215 A. Blood glucose stable. BG at goal on current insulin regimen (Transition Insulin Drip). Steroid use- None .   2 Days Post-Op  Renal function- Abnormal - Cr 4.7   Vasopressors-  None     Diet NPO     Eating:   NPO  Nausea: No  Hypoglycemia and intervention: No  Fever: No  TPN and/or TF: Yes  If yes, type of TF/TPN and rate: 30 cc/hr (held for JAY)    BP (!) 178/81   Pulse 91   Temp 98.6 °F (37 °C) (Axillary)   Resp 18   Ht 5' 5" (1.651 m)   Wt (!) 153.3 kg (338 lb)   LMP 01/01/2024 (Approximate) Comment: tubal ligation  SpO2 100%   BMI 56.25 kg/m²     Labs " "Reviewed and Include    Recent Labs   Lab 02/08/24  0302   *   CALCIUM 8.2*   ALBUMIN 1.7*   PROT 7.0   *   K 4.4   CO2 16*      BUN 49*   CREATININE 4.7*   ALKPHOS 160*   ALT 11   AST 33   BILITOT 0.2     Lab Results   Component Value Date    WBC 20.99 (H) 02/08/2024    HGB 7.3 (L) 02/08/2024    HCT 22.8 (L) 02/08/2024    MCV 84 02/08/2024     02/08/2024     No results for input(s): "TSH", "FREET4" in the last 168 hours.  Lab Results   Component Value Date    HGBA1C 10.4 (H) 01/30/2024       Nutritional status:   Body mass index is 56.25 kg/m².  Lab Results   Component Value Date    ALBUMIN 1.7 (L) 02/08/2024    ALBUMIN 1.8 (L) 02/07/2024    ALBUMIN 1.8 (L) 02/07/2024     No results found for: "PREALBUMIN"    Estimated Creatinine Clearance: 20.9 mL/min (A) (based on SCr of 4.7 mg/dL (H)).    Accu-Checks  Recent Labs     02/07/24  0008 02/07/24  0306 02/07/24  0836 02/07/24  1214 02/07/24  1626 02/07/24  1705 02/07/24  1927 02/08/24  0011 02/08/24  0302 02/08/24  0808   POCTGLUCOSE 144* 137* 154* 163* 135* 152* 184* 216* 169* 149*       Current Medications and/or Treatments Impacting Glycemic Control  Immunotherapy:    Immunosuppressants       None          Steroids:   Hormones (From admission, onward)      None          Pressors:    Autonomic Drugs (From admission, onward)      None          Hyperglycemia/Diabetes Medications:   Antihyperglycemics (From admission, onward)      Start     Stop Route Frequency Ordered    02/07/24 2000  insulin aspart U-100 pen 10 Units         -- SubQ Every 4 hours 02/07/24 1655 02/07/24 1700  insulin regular in 0.9 % NaCl 100 unit/100 mL (1 unit/mL) infusion        Question:  Enter initial dose (Units/hr):  Answer:  0.7    -- IV Continuous 02/07/24 1655 02/03/24 1010  insulin aspart U-100 pen 0-10 Units         -- SubQ Every 4 hours PRN 02/03/24 0911            ASSESSMENT and PLAN    Renal/  * Ayaz's gangrene  Managed per primary team  Optimize " BG control        ALVARADO (acute kidney injury)  Lab Results   Component Value Date    CREATININE 4.7 (H) 02/08/2024     Avoid insulin stacking  Titrate insulin slowly       Endocrine  New onset type 2 diabetes mellitus  BG goal 140-180    No previous hx of T2DM per family at bedside. A1c of 10.4. DKA at OSH. On TF being held for JAY.    Plan:  - Continue Transition IV insulin infusion at 0.9 u/hr with stepdown parameters (decreased per protocol)   -Continue Novolog 10 units q 4 hrs while on tube feeding (HOLD if tube feeding is stopped or BG < 100)   -Continue Moderate Dose Correction Scale  -BG monitoring q 4 hrs while NPO     ** Please call Endocrine for any BG related issues **      Discharge plans: TBD    Lab Results   Component Value Date    HGBA1C 10.4 (H) 01/30/2024             Salvador Alfaro PA-C  Endocrinology  [facility]

## 2024-02-08 NOTE — ASSESSMENT & PLAN NOTE
Lab Results   Component Value Date    CREATININE 4.7 (H) 02/08/2024     Avoid insulin stacking  Titrate insulin slowly

## 2024-02-08 NOTE — ANESTHESIA PREPROCEDURE EVALUATION
[facility]  Anesthesia Pre-Operative Evaluation         Patient Name: Sarah Saravia  YOB: 1970  MRN: [id number]    SUBJECTIVE:     Pre-operative evaluation for Procedure(s) (LRB):  DEBRIDEMENT, WOUND, replace wound vac, possible closure (Right)     02/08/2024    Sarah Saravia is a 53 y.o. female w/ a significant PMHx of DKA, recent CVA, ALVARADO s/p HD on 2/6, ros's gangrene who presented to the SICU intubated and sedated. She is s/p multiple debridements.    MRI 2/6: Several scattered punctate acute infarcts throughout the bilateral frontal lobes, centrum semiovale, basal ganglia, corpus callosum, and cerebellar hemispheres.  Overall distribution is concerning for embolic etiology     Patient now presents for the above procedure(s).    TTE Summary 2/6/2024:    Left Ventricle: The left ventricle is normal in size. Normal wall thickness. There is concentric remodeling. Normal wall motion. There is normal systolic function with a visually estimated ejection fraction of 60 - 65%. There is normal diastolic function. Normal left ventricular filling pressure.    Right Ventricle: Normal right ventricular cavity size. Wall thickness is normal. Right ventricle wall motion  is normal. Systolic function is normal.    Aorta: Aortic root is normal in size measuring 2.79 cm. Ascending aorta is normal measuring 2.57 cm.    IVC/SVC: Normal venous pressure at 3 mmHg.    LDA:   Trialysis (Dialysis) Catheter 01/31/24 1051 right internal jugular (Active)   Line Necessity Review CRRT/HD 02/05/24 1915   Verification by X-ray Yes 02/05/24 1915   Site Assessment No drainage;No redness;No swelling 02/06/24 0315   Line Securement Device Secured with sutures 02/05/24 1915   Dressing Type CHG impregnated dressing/sponge;Central line dressing 02/06/24 0315   Dressing Status Dry;Clean;Intact 02/06/24 0315   Dressing Intervention Integrity maintained 02/06/24 0315   Date on Dressing 02/01/24 02/05/24 1915    Dressing Due to be Changed 02/08/24 02/06/24 0315   Venous Patency/Care accessed 02/06/24 0315   Arterial Patency/Care accessed 02/06/24 0315   Distal Patency/Care infusing 02/06/24 0315   Flows Reversed 02/06/24 0315   Waveform Normal 02/06/24 0315   Number of days: 5            Peripheral IV - Single Lumen 02/04/24 2241 20 G Right Antecubital (Active)   Site Assessment Dry;Clean;Intact 02/06/24 0315   Extremity Assessment Distal to IV No abnormal discoloration;No redness;No swelling;No warmth 02/06/24 0315   Line Status Blood return noted 02/06/24 0315   Dressing Status Clean;Dry;Intact 02/06/24 0315   Dressing Intervention Integrity maintained 02/06/24 0315   Dressing Change Due 02/08/24 02/06/24 0315   Site Change Due 02/08/24 02/05/24 1915   Number of days: 1       Arterial Line 01/31/24 1025 Right Radial (Active)   Site Assessment Clean;Dry;Intact 02/06/24 0315   Line Status Pulsatile blood flow 02/06/24 0315   Art Line Waveform Appropriate;Square wave test performed 02/06/24 0315   Arterial Line Interventions Zeroed and calibrated;Leveled 02/06/24 0315   Color/Movement/Sensation Capillary refill less than 3 sec 02/06/24 0315   Dressing Type Central line dressing 02/06/24 0315   Dressing Status Clean;Dry;Intact 02/06/24 0315   Dressing Intervention Integrity maintained 02/06/24 0315   Dressing Change Due 02/08/24 02/06/24 0315   Tubing Change Due 02/08/24 02/05/24 1915   Number of days: 5            NG/OG Tube 02/01/24 2250 18 Fr. Center mouth (Active)   Placement Check placement verified by x-ray 02/06/24 0315   Tolerance no signs/symptoms of discomfort 02/06/24 0315   Securement secured to commercial device 02/06/24 0315   Clamp Status/Tolerance unclamped 02/06/24 0315   Suction Setting/Drainage Method suction at the bedside 02/06/24 0315   Insertion Site Appearance no redness, warmth, tenderness, skin breakdown, drainage 02/06/24 0315   Drainage Brown 02/02/24 1501   Flush/Irrigation flushed w/;water;no  resistance met 02/06/24 0315   Feeding Type continuous;by pump 02/06/24 0315   Feeding Action feeding continued 02/06/24 0315   Current Rate (mL/hr) 30 mL/hr 02/05/24 1915   Goal Rate (mL/hr) 30 mL/hr 02/05/24 1915   Intake (mL) 30 mL 02/05/24 1101   Water Bolus (mL) 60 mL 02/05/24 1000   Tube Output(mL)(Include Discarded Residual) 15 mL 02/04/24 1901   Intake (mL) - Formula Tube Feeding 30 02/06/24 0000   Residual Amount (ml) 10 ml 02/04/24 0701   Number of days: 4            Rectal Tube 02/04/24 1545 (Active)   Outcome gas expelled 02/06/24 0315   Stool Color Brown 02/06/24 0315   Insertion Site Appearance no redness, warmth, tenderness, skin breakdown, drainage 02/06/24 0315   Flush/Irrigation flushed w/;water;no resistance met 02/06/24 0315   Rectal Tube Output 70 mL 02/05/24 1101   Number of days: 1       Prev airway:   Placement Date: 01/31/24; Placement Time: 1020 (created via procedure documentation); Method of Intubation: Video Laryngoscopy; Intubated: Postinduction; Blade: [place] #3; Airway Device Size: 7.5; Placement Verified By: Capnometry; Complicating Factors: Obesity, Short neck; Intubation Findings: Bilateral breath sounds, Atraumatic/Condition of teeth unchanged;  Depth of Insertion: 25; Securment: Gum line; Complications: None     Drips: None documented.   dextrose 10 % in water (D10W)      dextrose 5 % and 0.45 % NaCl 50 mL/hr at 02/08/24 0927    insulin regular 1 units/mL infusion orderable (TRANSFER) 0.7 Units/hr (02/08/24 0600)       Patient Active Problem List   Diagnosis    Diabetic acidosis without coma    Ayaz's gangrene    Morbid (severe) obesity due to excess calories    Severe sepsis    ALVARADO (acute kidney injury)    Hyponatremia    Metabolic acidosis    Encephalopathy, metabolic    Acute hypoxemic respiratory failure    Weaning from mechanically assisted ventilation initiated    Acute blood loss anemia    Acute renal failure with tubular necrosis    New onset type 2 diabetes mellitus     Encephalopathy    Leukocytosis    Ac isch multi vasc territories stroke    Cerebral infarction    Encounter for palliative care    Hypoalbuminemia       Review of patient's allergies indicates:  No Known Allergies    Current Inpatient Medications:   acetaminophen  650 mg Per OG tube Q6H    amLODIPine  10 mg Per OG tube Daily    carvediloL  6.25 mg Per OG tube BID    cefTRIAXone (Rocephin) IV (PEDS and ADULTS)  2 g Intravenous Q24H    famotidine  20 mg Per OG tube Daily    heparin (porcine)  7,500 Units Subcutaneous Q8H    insulin aspart U-100  10 Units Subcutaneous Q4H    metronidazole  500 mg Intravenous Q8H    psyllium husk (aspartame)  1 packet Per OG tube BID    sevelamer carbonate  1.6 g Oral TID WM       No current facility-administered medications on file prior to encounter.     No current outpatient medications on file prior to encounter.       Past Surgical History:   Procedure Laterality Date    INCISION AND DRAINAGE OF PERIRECTAL REGION N/A 1/29/2024    Procedure: INCISION AND DRAINAGE, PERIRECTAL REGION;  Surgeon: Axel Ramsay MD;  Location: [facility] OR;  Service: General;  Laterality: N/A;    PLACEMENT, TRIALYSIS CATH Right 1/31/2024    Procedure: INSERTION, CATHETER, TRIPLE LUMEN, HEMODIALYSIS, TEMPORARY;  Surgeon: Alvin Junior MD;  Location: [facility] OR;  Service: General;  Laterality: Right;    WOUND DEBRIDEMENT Bilateral 2/2/2024    Procedure: DEBRIDEMENT, WOUND;  Surgeon: Steve Cole MD;  Location: [facility] OR [address] [place] [address];  Service: General;  Laterality: Bilateral;  Bilateral groin  Possible wound vac placement    WOUND DEBRIDEMENT Right 2/6/2024    Procedure: DEBRIDEMENT, WOUND, replace wound vac, possible closure;  Surgeon: Steve Cole MD;  [facility];  Service: General;  Laterality: Right;  RLE    WOUND EXPLORATION Right 1/31/2024    Procedure: IRRIGATION & DEBRIDEMENT, WOUND DEBRIDEMENT;  Surgeon: Alvin Junior MD;  Location: [facility] OR;  Service: General;  Laterality: Right;        Social History     Socioeconomic History    Marital status: Single     Social Determinants of Health     Financial Resource Strain: Patient Unable To Answer (1/31/2024)    Overall Financial Resource Strain (CARDIA)     Difficulty of Paying Living Expenses: Patient unable to answer   Food Insecurity: Patient Declined (1/31/2024)    Hunger Vital Sign     Worried About Running Out of Food in the Last Year: Patient declined     Ran Out of Food in the Last Year: Patient declined   Transportation Needs: Patient Unable To Answer (1/31/2024)    PRAPARE - Transportation     Lack of Transportation (Medical): Patient unable to answer     Lack of Transportation (Non-Medical): Patient unable to answer   Stress: Patient Unable To Answer (1/31/2024)    [place] [employer] - Occupational Stress Questionnaire     Feeling of Stress : Patient unable to answer   Housing Stability: Patient Unable To Answer (1/31/2024)    Housing Stability Vital Sign     Unable to Pay for Housing in the Last Year: Patient unable to answer     Unstable Housing in the Last Year: Patient unable to answer       OBJECTIVE:     Vital Signs Range (Last 24H):  Temp:  [36.7 °C (98.1 °F)-37 °C (98.6 °F)]   Pulse:  []   Resp:  [13-27]   BP: (127-178)/(61-93)   SpO2:  [97 %-100 %]   Arterial Line BP: (127-181)/(59-89)       Significant Labs:  Lab Results   Component Value Date    WBC 20.99 (H) 02/08/2024    HGB 7.3 (L) 02/08/2024    HCT 22.8 (L) 02/08/2024     02/08/2024    ALT 11 02/08/2024    AST 33 02/08/2024     (L) 02/08/2024    K 4.4 02/08/2024     02/08/2024    CREATININE 4.7 (H) 02/08/2024    BUN 49 (H) 02/08/2024    CO2 16 (L) 02/08/2024    INR 1.0 02/01/2024    HGBA1C 10.4 (H) 01/30/2024       Diagnostic Studies: No relevant studies.    EKG:   Results for orders placed or performed during the hospital encounter of 01/29/24   EKG 12-lead    Collection Time: 01/29/24  7:14 PM    Narrative    Test Reason :  R00.0,    Vent. Rate : 111 BPM     Atrial Rate : 111 BPM     P-R Int : 134 ms          QRS Dur : 102 ms      QT Int : 344 ms       P-R-T Axes : 070 066 -49 degrees     QTc Int : 467 ms    Sinus tachycardia  LVH with repolarization abnormality  Abnormal ECG  No previous ECGs available  Confirmed by Rubén Ortiz MD ([id number]) on 1/31/2024 6:01:21 AM    Referred By: [email or web address]   SELF           Confirmed By:Rubén Ortiz MD       2D ECHO:  TTE:  Results for orders placed or performed during the hospital encounter of 01/29/24   Echo   Result Value Ref Range    RA Width 3.19 cm    Left Atrium Major Axis 5.14 cm    Left Atrium Minor Axis 5.35 cm    RA Major Axis 4.87 cm    LV Diastolic Volume 81.98 mL    LV Systolic Volume 27.87 mL    MV Peak E Josselyn 0.77 m/s    Ao VTI 31.37 cm    Ao peak josselyn 2.01 m/s    LVOT peak VTI 16.92 cm    LVOT peak josselyn 1.29 m/s    LVOT diameter 2.02 cm    AV mean gradient 10 mmHg    TAPSE 1.72 cm    RVDD 3.16 cm    LA size 3.99 cm    Ascending aorta 2.57 cm    STJ 2.32 cm    Sinus 2.79 cm    LVIDs 2.73 2.1 - 4.0 cm    Posterior Wall 1.1 0.6 - 1.1 cm    IVS 1.3 (A) 0.6 - 1.1 cm    LVIDd 5.0 3.5 - 6.0 cm    TDI LATERAL 0.10 m/s    LA WIDTH 4.17 cm    TDI SEPTAL 0.07 m/s    LV LATERAL E/E' RATIO 7.70 m/s    LV SEPTAL E/E' RATIO 11.00 m/s    FS 45 (A) 28 - 44 %    LA volume 74.15 cm3    LV mass 233.75 g    ZLVIDD -11.05     ZLVIDS -9.36     Left Ventricle Relative Wall Thickness 0.44 cm    AV valve area 1.73 cm²    AV Velocity Ratio 0.64     AV index (prosthetic) 0.54     Mean e' 0.09 m/s    LVOT area 3.2 cm2    LVOT stroke volume 54.20 cm3    AV peak gradient 16 mmHg    E/E' ratio 9.06 m/s    LV Systolic Volume Index 11.0 mL/m2    LV Diastolic Volume Index 32.40 mL/m2    LA Volume Index 29.3 mL/m2    LV Mass Index 92 g/m2    EDWARD by Velocity Ratio 2.06 cm²    BSA 2.72 m2    Est. RA pres 3 mmHg    Narrative      Left Ventricle: The left ventricle is normal in size. Normal wall   thickness. There is  concentric remodeling. Normal wall motion. There is   normal systolic function with a visually estimated ejection fraction of 60   - 65%. There is normal diastolic function. Normal left ventricular filling   pressure.    Right Ventricle: Normal right ventricular cavity size. Wall thickness   is normal. Right ventricle wall motion  is normal. Systolic function is   normal.    Aorta: Aortic root is normal in size measuring 2.79 cm. Ascending aorta   is normal measuring 2.57 cm.    IVC/SVC: Normal venous pressure at 3 mmHg.         JAY:  No results found for this or any previous visit.    ASSESSMENT/PLAN:         Pre-op Assessment    I have reviewed the Patient Summary Reports.    I have reviewed the NPO Status.   I have reviewed the Medications.     Review of Systems  Anesthesia Hx:  No problems with previous Anesthesia   History of prior surgery of interest to airway management or planning:  Previous anesthesia: General        Denies Family Hx of Anesthesia complications.     Cardiovascular:        Denies MI.         Denies CHF.                                 Pulmonary:    Denies COPD.      Denies Sleep Apnea.                Renal/:  Chronic Renal Disease        Kidney Function/Disease             Neurological:   CVA                                    Endocrine:  Diabetes    Diabetes                          Physical Exam  General: Well nourished and Unconscious  Intubated and sedated  Airway:  Mallampati: unable to assess   Pre-Existing Airway: Oral Endotracheal tube        Anesthesia Plan  Type of Anesthesia, risks & benefits discussed:    Anesthesia Type: Gen ETT  Intra-op Monitoring Plan: Standard ASA Monitors  Post Op Pain Control Plan: multimodal analgesia and IV/PO Opioids PRN  Induction:  IV  Airway Plan: Direct and Video, Post-Induction  Informed Consent: Informed consent signed with the Patient and all parties understand the risks and agree with anesthesia plan.  All questions answered.   ASA Score:  4  Day of Surgery Review of History & Physical: H&P Update referred to the surgeon/provider.    Ready For Surgery From Anesthesia Perspective.     .

## 2024-02-08 NOTE — PROGRESS NOTES
[facility]  Nephrology  Progress Note    Patient Name: Sarah Saravia  MRN: [id number]  Admission Date: 1/29/2024  Hospital Length of Stay: 10 days  Attending Provider: Steve Cole MD   Primary Care Physician: [facility]  Principal Problem:Ayaz's gangrene    Subjective:     Interval History:   No acute changes overnight. Plans for possible JAY today to investigate for vegetation / septic emboli.   sCr 4.7 from 3.4. Other electrolytes stable. 24 hr  mL. Remain hypervolemic on exam.   Net positive 774 mL/24 hrs. Intubated at 40% FiO2.     Review of patient's allergies indicates:  No Known Allergies  Current Facility-Administered Medications   Medication Frequency    0.9%  NaCl infusion PRN    acetaminophen tablet 650 mg Q6H    albuterol-ipratropium 2.5 mg-0.5 mg/3 mL nebulizer solution 3 mL Q4H PRN    amLODIPine tablet 10 mg Daily    carvediloL tablet 6.25 mg BID    cefTRIAXone (ROCEPHIN) 2 g in dextrose 5 % in water (D5W) 100 mL IVPB (MB+) Q24H    dextrose 10 % infusion Continuous PRN    dextrose 10% bolus 125 mL 125 mL PRN    dextrose 10% bolus 250 mL 250 mL PRN    dextrose 5 % and 0.45 % NaCl infusion Continuous    famotidine tablet 20 mg Daily    glucagon (human recombinant) injection 1 mg PRN    glucose chewable tablet 16 g PRN    glucose chewable tablet 24 g PRN    heparin (porcine) injection 7,500 Units Q8H    hydrALAZINE injection 10 mg Q4H PRN    HYDROmorphone injection 0.5 mg Q4H PRN    insulin aspart U-100 pen 0-10 Units Q4H PRN    insulin aspart U-100 pen 10 Units Q4H    insulin regular in 0.9 % NaCl 100 unit/100 mL (1 unit/mL) infusion Continuous    labetalol 20 mg/4 mL (5 mg/mL) IV syring Q6H PRN    metronidazole IVPB 500 mg Q8H    naloxone 0.4 mg/mL injection 0.02 mg PRN    ondansetron injection 4 mg Q6H PRN    oxyCODONE 5 mg/5 mL solution 10 mg Q6H PRN    oxyCODONE 5 mg/5 mL solution 5 mg Q6H PRN    prochlorperazine injection Soln 5 mg  Q6H PRN    psyllium husk (aspartame) 3.4 gram packet 1 packet BID    sevelamer carbonate pwpk 1.6 g TID WM    sodium chloride 0.9% bolus 250 mL 250 mL PRN    sodium chloride 0.9% bolus 250 mL 250 mL PRN    sodium chloride 0.9% flush 10 mL PRN       Objective:     Vital Signs (Most Recent):  Temp: 98.1 °F (36.7 °C) (02/08/24 0800)  Pulse: 85 (02/08/24 1000)  Resp: 17 (02/08/24 1000)  BP: (!) 147/68 (02/08/24 1000)  SpO2: 99 % (02/08/24 1000) Vital Signs (24h Range):  Temp:  [98.1 °F (36.7 °C)-98.6 °F (37 °C)] 98.1 °F (36.7 °C)  Pulse:  [] 85  Resp:  [13-27] 17  SpO2:  [97 %-100 %] 99 %  BP: (127-178)/(61-93) 147/68  Arterial Line BP: (127-181)/(58-89) 149/64     Weight: (!) 153.3 kg (338 lb) (02/08/24 0300)  Body mass index is 56.25 kg/m².  Body surface area is 2.65 meters squared.    I/O last 3 completed shifts:  In: 2400.2 [I.V.:147.5; Other:600; NG/GT:985; IV Piggyback:667.7]  Out: 2825 [Urine:350; Other:2475]     Physical Exam  Vitals and nursing note reviewed.   Constitutional:       General: She is not in acute distress.     Appearance: She is obese. She is not ill-appearing or toxic-appearing.      Interventions: She is sedated and intubated.   Eyes:      General: No scleral icterus.        Right eye: No discharge.         Left eye: No discharge.   Cardiovascular:      Rate and Rhythm: Normal rate.   Pulmonary:      Effort: No respiratory distress. She is intubated.      Comments:       Abdominal:      General: There is distension.   Musculoskeletal:      Right lower leg: Edema present.      Left lower leg: Edema present.          Significant Labs:  CBC:   Recent Labs   Lab 02/08/24  0302   WBC 20.99*   RBC 2.71*   HGB 7.3*   HCT 22.8*      MCV 84   MCH 26.9*   MCHC 32.0     CMP:   Recent Labs   Lab 02/08/24  0302   *   CALCIUM 8.2*   ALBUMIN 1.7*   PROT 7.0   *   K 4.4   CO2 16*      BUN 49*   CREATININE 4.7*   ALKPHOS 160*   ALT 11   AST 33   BILITOT 0.2     All labs within  the past 24 hours have been reviewed.     Assessment/Plan:     Renal/  * Aayz's gangrene  -management per primary     ALVARADO (acute kidney injury)  Transfer from [facility]. Admitted for fourniers gangrene. Initiated HD on 1/31. ALVARADO 2/2 ATN in setting of septic shock. Arrived with CR 3.8. Unknown baseline.  Plan to OR today. Net -1.3L.OR today for debridement with possible closure and wound vac placement     ALVARADO most likley ATN in setting of septic shock     Plan/Recommendation  -Will plan for HD today for clearance and volume management.   -Will plan for Lasix challenge tomorrow, recommend 120 mg IV x 1 on 2/9/24.  -Will eval RRT needs daily   -Daily RFP   -Strict I&Os  -Avoid nephrotoxins, if possible         Thank you for your consult. I will follow-up with patient. Please contact us if you have any additional questions.    Shwetha Gregory, POONAM, FNP-C  Nephrology  [facility]

## 2024-02-08 NOTE — ASSESSMENT & PLAN NOTE
Multiple acute embolic strokes noted on MRI. TTE without evidence of infective endocarditis. Blood cultures no growth.  Low suspicion for infective endocarditis however agree with work up detailed by vascular neurology. Will follow up JAY results.

## 2024-02-08 NOTE — SUBJECTIVE & OBJECTIVE
"Interval HPI:   No acute events overnight. Patient in room 15370/51384 A. Blood glucose stable. BG at goal on current insulin regimen (Transition Insulin Drip). Steroid use- None .   2 Days Post-Op  Renal function- Abnormal - Cr 4.7   Vasopressors-  None     Diet NPO     Eating:   NPO  Nausea: No  Hypoglycemia and intervention: No  Fever: No  TPN and/or TF: Yes  If yes, type of TF/TPN and rate: 30 cc/hr (held for JAY)    BP (!) 178/81   Pulse 91   Temp 98.6 °F (37 °C) (Axillary)   Resp 18   Ht 5' 5" (1.651 m)   Wt (!) 153.3 kg (338 lb)   LMP 01/01/2024 (Approximate) Comment: tubal ligation  SpO2 100%   BMI 56.25 kg/m²     Labs Reviewed and Include    Recent Labs   Lab 02/08/24  0302   *   CALCIUM 8.2*   ALBUMIN 1.7*   PROT 7.0   *   K 4.4   CO2 16*      BUN 49*   CREATININE 4.7*   ALKPHOS 160*   ALT 11   AST 33   BILITOT 0.2     Lab Results   Component Value Date    WBC 20.99 (H) 02/08/2024    HGB 7.3 (L) 02/08/2024    HCT 22.8 (L) 02/08/2024    MCV 84 02/08/2024     02/08/2024     No results for input(s): "TSH", "FREET4" in the last 168 hours.  Lab Results   Component Value Date    HGBA1C 10.4 (H) 01/30/2024       Nutritional status:   Body mass index is 56.25 kg/m².  Lab Results   Component Value Date    ALBUMIN 1.7 (L) 02/08/2024    ALBUMIN 1.8 (L) 02/07/2024    ALBUMIN 1.8 (L) 02/07/2024     No results found for: "PREALBUMIN"    Estimated Creatinine Clearance: 20.9 mL/min (A) (based on SCr of 4.7 mg/dL (H)).    Accu-Checks  Recent Labs     02/07/24  0008 02/07/24  0306 02/07/24  0836 02/07/24  1214 02/07/24  1626 02/07/24  1705 02/07/24  1927 02/08/24  0011 02/08/24  0302 02/08/24  0808   POCTGLUCOSE 144* 137* 154* 163* 135* 152* 184* 216* 169* 149*       Current Medications and/or Treatments Impacting Glycemic Control  Immunotherapy:    Immunosuppressants       None          Steroids:   Hormones (From admission, onward)      None          Pressors:    Autonomic Drugs (From " admission, onward)      None          Hyperglycemia/Diabetes Medications:   Antihyperglycemics (From admission, onward)      Start     Stop Route Frequency Ordered    02/07/24 2000  insulin aspart U-100 pen 10 Units         -- SubQ Every 4 hours 02/07/24 1655 02/07/24 1700  insulin regular in 0.9 % NaCl 100 unit/100 mL (1 unit/mL) infusion        Question:  Enter initial dose (Units/hr):  Answer:  0.7    -- IV Continuous 02/07/24 1655    02/03/24 1010  insulin aspart U-100 pen 0-10 Units         -- SubQ Every 4 hours PRN 02/03/24 0911

## 2024-02-08 NOTE — SUBJECTIVE & OBJECTIVE
Interval History:   No acute changes overnight. Plans for possible JAY today to investigate for vegetation / septic emboli.   sCr 4.7 from 3.4. Other electrolytes stable. 24 hr  mL. Remain hypervolemic on exam.   Net positive 774 mL/24 hrs. Intubated at 40% FiO2.     Review of patient's allergies indicates:  No Known Allergies  Current Facility-Administered Medications   Medication Frequency    0.9%  NaCl infusion PRN    acetaminophen tablet 650 mg Q6H    albuterol-ipratropium 2.5 mg-0.5 mg/3 mL nebulizer solution 3 mL Q4H PRN    amLODIPine tablet 10 mg Daily    carvediloL tablet 6.25 mg BID    cefTRIAXone (ROCEPHIN) 2 g in dextrose 5 % in water (D5W) 100 mL IVPB (MB+) Q24H    dextrose 10 % infusion Continuous PRN    dextrose 10% bolus 125 mL 125 mL PRN    dextrose 10% bolus 250 mL 250 mL PRN    dextrose 5 % and 0.45 % NaCl infusion Continuous    famotidine tablet 20 mg Daily    glucagon (human recombinant) injection 1 mg PRN    glucose chewable tablet 16 g PRN    glucose chewable tablet 24 g PRN    heparin (porcine) injection 7,500 Units Q8H    hydrALAZINE injection 10 mg Q4H PRN    HYDROmorphone injection 0.5 mg Q4H PRN    insulin aspart U-100 pen 0-10 Units Q4H PRN    insulin aspart U-100 pen 10 Units Q4H    insulin regular in 0.9 % NaCl 100 unit/100 mL (1 unit/mL) infusion Continuous    labetalol 20 mg/4 mL (5 mg/mL) IV syring Q6H PRN    metronidazole IVPB 500 mg Q8H    naloxone 0.4 mg/mL injection 0.02 mg PRN    ondansetron injection 4 mg Q6H PRN    oxyCODONE 5 mg/5 mL solution 10 mg Q6H PRN    oxyCODONE 5 mg/5 mL solution 5 mg Q6H PRN    prochlorperazine injection Soln 5 mg Q6H PRN    psyllium husk (aspartame) 3.4 gram packet 1 packet BID    sevelamer carbonate pwpk 1.6 g TID WM    sodium chloride 0.9% bolus 250 mL 250 mL PRN    sodium chloride 0.9% bolus 250 mL 250 mL PRN    sodium chloride 0.9% flush 10 mL PRN       Objective:     Vital Signs (Most Recent):  Temp: 98.1 °F (36.7 °C) (02/08/24  0800)  Pulse: 85 (02/08/24 1000)  Resp: 17 (02/08/24 1000)  BP: (!) 147/68 (02/08/24 1000)  SpO2: 99 % (02/08/24 1000) Vital Signs (24h Range):  Temp:  [98.1 °F (36.7 °C)-98.6 °F (37 °C)] 98.1 °F (36.7 °C)  Pulse:  [] 85  Resp:  [13-27] 17  SpO2:  [97 %-100 %] 99 %  BP: (127-178)/(61-93) 147/68  Arterial Line BP: (127-181)/(58-89) 149/64     Weight: (!) 153.3 kg (338 lb) (02/08/24 0300)  Body mass index is 56.25 kg/m².  Body surface area is 2.65 meters squared.    I/O last 3 completed shifts:  In: 2400.2 [I.V.:147.5; Other:600; NG/GT:985; IV Piggyback:667.7]  Out: 2825 [Urine:350; Other:2475]     Physical Exam  Vitals and nursing note reviewed.   Constitutional:       General: She is not in acute distress.     Appearance: She is obese. She is not ill-appearing or toxic-appearing.      Interventions: She is sedated and intubated.   Eyes:      General: No scleral icterus.        Right eye: No discharge.         Left eye: No discharge.   Cardiovascular:      Rate and Rhythm: Normal rate.   Pulmonary:      Effort: No respiratory distress. She is intubated.      Comments:       Abdominal:      General: There is distension.   Musculoskeletal:      Right lower leg: Edema present.      Left lower leg: Edema present.          Significant Labs:  CBC:   Recent Labs   Lab 02/08/24  0302   WBC 20.99*   RBC 2.71*   HGB 7.3*   HCT 22.8*      MCV 84   MCH 26.9*   MCHC 32.0     CMP:   Recent Labs   Lab 02/08/24  0302   *   CALCIUM 8.2*   ALBUMIN 1.7*   PROT 7.0   *   K 4.4   CO2 16*      BUN 49*   CREATININE 4.7*   ALKPHOS 160*   ALT 11   AST 33   BILITOT 0.2     All labs within the past 24 hours have been reviewed.

## 2024-02-08 NOTE — PLAN OF CARE
"Please link this note to the resident's progress note. This note serves as the attestation.    In brief, Sarah Saravia is a 53 year-old woman with a history of hypertension, morbid obesity, renal insufficiency, and type 2 diabetes who was admitted as a transfer for necrotizing soft tissue infection.  She has undergone debridements for treatment.  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    Critical Care issues in this patient include:    Ayaz's gangrene              * Underwent debridement/wound vac exchange on 2/6. Plan for another look on 2/9. Continue rocephin and flagyl. ID consulted and are following. Appreciate their recommendations. Wound cultures growing pansensitive proteus mirabilis and Bacteroides. Blood cultures have been NGTD.     Encephalopathy, metabolic              * Initially thought to be due to sepsis and severe infection. Neurology consulted and are following. EEG done and shows severe slowing and no seizures. MRI brain done today revealed "several scattered punctate acute infarcts throughout the bilateral frontal lobes, centrum semiovale, basal ganglia, corpus callosum, and cerebellar hemispheres" concerning for embolic source. Obtained echo to evaluate for cardiac vegetations or PFO; none noted. Consult Vascular Neurology per Neurology (general). Appreciate their recs. CTA head and neck done and no significant occlusion noted. JAY not performed by Cardiology because they do not believe it'll change her treatment, which I disagree with. This will help with length of antibiotic therapy as well as prognostication for her family. Will re-engage or have cardiac anesthesia perform JAY if able.     Acute renal failure with tubular necrosis              * ARF on CKD. Meza replaced 2 days ago since bladder scan showed 400 mL. She continues to make some urine. Keep meza in place for now. Nephrology has been consulted and are " following. Appreciate recs. Received HD yesterday. Trialysis replaced 2/6 and functioning well without issues. Monitor electrolytes.     Type 2 diabetes mellitus with hyperglycemia              * Endocrinology consulted and are following. Appreciate their recs. Poorly controlled BG at baseline with recent Hgb A1c at 10.4%. Continue with hourly glucose checks and titrate insulin infusion per protocol.      Encounter for weaning the ventilator              * Remains on pressure support. Cannot extubate secondary to poor mental status. CXR and ABG pending this morning.     Patient remains stable in the ICU. She has multiple infarcts noted on her recent MRI. Unclear of prognosis from a neurological standpoint. Palliative team have seen her and are following. Appreciate their recommendations. Continue tube feeds. BMs no longer loose with fiber in place. FMS in place. Continue GI and DVT prophylaxis. Continue ICU care.    Critical care time spent: 40 min    Critical care was time spent personally by me on the following activities: development of treatment plan with patient or surrogate and bedside caregivers, discussions with consultants, evaluation of patient's response to treatment, examination of patient, ordering and performing treatments and interventions, ordering and review of laboratory studies, ordering and review of radiographic studies, pulse oximetry, re-evaluation of patient's condition.  This critical care time did not overlap with that of any other provider or involve time for any procedures.      Karla Connelly MD, FACS  General Surgery and Surgical Critical Care  [facility]

## 2024-02-08 NOTE — PROGRESS NOTES
[facility]ensive Care  Critical Care - Surgery  Progress Note    Patient Name: Sarah Saravia  MRN: [id number]  Admission Date: 1/29/2024  Hospital Length of Stay: 10 days  Code Status: Full Code  Attending Provider: Steve Cole MD  [facility]   Principal Problem: Aayz's gangrene    Subjective:   Interval History/Significant Events: NAEON. NPO midnight for JAY. Patient does not withdraw to pain and has no output through rectal tube. Pupils are round and reactive    Follow-up For: Procedure(s) (LRB):  DEBRIDEMENT, WOUND, replace wound vac, possible closure (Right)    Post-Operative Day: 2 Days Post-Op    Objective:     Vital Signs (Most Recent):  Temp: 98.6 °F (37 °C) (02/08/24 0330)  Pulse: 92 (02/08/24 0630)  Resp: 17 (02/08/24 0630)  BP: (!) 157/70 (02/08/24 0600)  SpO2: 99 % (02/08/24 0630) Vital Signs (24h Range):  Temp:  [98.1 °F (36.7 °C)-98.6 °F (37 °C)] 98.6 °F (37 °C)  Pulse:  [] 92  Resp:  [13-27] 17  SpO2:  [97 %-100 %] 99 %  BP: (124-173)/(61-93) 157/70  Arterial Line BP: (123-181)/(59-89) 148/59     Weight: (!) 153.3 kg (338 lb)  Body mass index is 56.25 kg/m².      Intake/Output Summary (Last 24 hours) at 2/8/2024 0722  Last data filed at 2/8/2024 0600  Gross per 24 hour   Intake 1183.31 ml   Output 445 ml   Net 738.31 ml          Physical Exam  Vitals reviewed.   Constitutional:       Appearance: She is obese.   HENT:      Head: Normocephalic.      Right Ear: Tympanic membrane normal.      Nose: Nose normal.      Mouth/Throat:      Mouth: Mucous membranes are moist.   Eyes:      Pupils: Pupils are equal, round, and reactive to light.      Comments: Eyes are midline without deviation   Cardiovascular:      Rate and Rhythm: Normal rate and regular rhythm.   Pulmonary:      Effort: Pulmonary effort is normal.      Breath sounds: Normal breath sounds.      Comments: Intubated on vent  Abdominal:      General: Abdomen is flat.       Palpations: Abdomen is soft.   Genitourinary:     Comments: Rectal tube and meza  Musculoskeletal:         General: Normal range of motion.   Skin:     General: Skin is warm.      Capillary Refill: Capillary refill takes less than 2 seconds.   Neurological:      Comments: Does not respond to stimuli or pain.    Psychiatric:         Mood and Affect: Mood normal.            Vents:  Vent Mode: Spont (02/08/24 0419)  Set Rate: 18 BPM (02/06/24 0349)  Vt Set: 420 mL (02/06/24 0349)  Pressure Support: 10 cmH20 (02/08/24 0419)  PEEP/CPAP: 5 cmH20 (02/08/24 0419)  Oxygen Concentration (%): 40 (02/08/24 0630)  Peak Airway Pressure: 16 cmH20 (02/08/24 0419)  Plateau Pressure: 15 cmH20 (02/08/24 0419)  Total Ve: 9.38 L/m (02/08/24 0419)  Negative Inspiratory Force (cm H2O): 0 (02/08/24 0419)  F/VT Ratio<105 (RSBI): (!) 24.47 (02/08/24 0419)    Lines/Drains/Airways       Central Venous Catheter Line  Duration             Trialysis (Dialysis) Catheter 02/06/24 1345 right internal jugular 1 day              Drain  Duration                  Rectal Tube 02/04/24 1545 3 days         NG/OG Tube 02/06/24 1030 Center mouth 1 day         Urethral Catheter 02/06/24 1900 1 day              Airway  Duration                  Airway - Non-Surgical 01/31/24 1020 7 days              Arterial Line  Duration             Arterial Line 01/31/24 1025 Right Radial 7 days              Peripheral Intravenous Line  Duration                  Peripheral IV - Single Lumen 02/04/24 2241 20 G Right Antecubital 3 days         Peripheral IV - Single Lumen 02/08/24 0152 18 G Anterior;Left Forearm <1 day                    Significant Labs:    CBC/Anemia Profile:  Recent Labs   Lab 02/07/24  0308 02/08/24  0302   WBC 31.47* 20.99*   HGB 8.4* 7.3*   HCT 25.7* 22.8*    342   MCV 81* 84   RDW 15.6* 17.2*        Chemistries:  Recent Labs   Lab 02/06/24  2228 02/07/24  0308 02/08/24  0302   * 133*  133* 134*   K 4.4 4.4  4.4 4.4    104  104  105   CO2 18* 15*  15* 16*   BUN 30* 31*  31* 49*   CREATININE 3.1* 3.4*  3.4* 4.7*   CALCIUM 8.5* 8.4*  8.4* 8.2*   ALBUMIN 1.8* 1.8*  1.8* 1.7*   PROT  --  7.3 7.0   BILITOT  --  0.3 0.2   ALKPHOS  --  153* 160*   ALT  --  13 11   AST  --  32 33   MG 2.2 2.2  2.2 2.3   PHOS 4.1 5.2*  5.2* 7.9*           Significant Imaging:  CTH completed and read pending. JAY pending.  Assessment/Plan:     Renal/  * Ayaz's gangrene    Neuro/Psych:   #Acute Encephalopathy  CTH 2/3 with scattered hypoattenuation likely representing age indeterminate infarcts. EEG findings consistent with moderate-severe encephalopathy. Ddx includes septic embolic vs inflammatory vs hypercoagulopathy.   -- Sedation: None  -- Pain: kermit tylenol, dialudid and oxy prn  -- GCS 7T: E2, V1T, M4; exam stable  -- Neurology following; appreciate recs  -- Neurovascular following; appreciate recs  -- Palliative following; GOC conversation 2/7; appreciate recs  -- MRI 2/6: Several scattered punctate acute infarcts throughout the bilateral frontal lobes, centrum semiovale, basal ganglia, corpus callosum, and cerebellar hemispheres.  Overall distribution is concerning for embolic etiology   -- CTA 2/6: Atherosclerotic plaquing of the carotid bifurcations and proximal ICAs with less than 50% proximal ICA stenosis by NASCET criteria   -- Follow-up CT official read             Cards:   -- HDS  -- goal SBP < 180, MAP >65  -- hypertensive, hydralazine and labetalol prns  -- Continue amlodipine 10mg daily; increase coreg 6.25mg BID  -- ECHO 2/6: Unremarkable.   -- [facility] neuro recommend JAY to r/o vegetation.  -- JAY pending      Pulm:   #Acute Respiratory Failure  -- Intubated on spontaneous.  -- Goal O2 sat > 90%  -- CXR stable      Renal:  #ALVARADO on CKD  #ATN  Received HD 2/6 without issue.  -- Nephrology following; appreciate recs  -- RRT as needed per nephrology  -- Stool output 70  -- Urine output: 50cc  Recent Labs   Lab 02/06/24 2228 02/07/24  030  02/08/24  0302   BUN 30* 31*  31* 49*   CREATININE 3.1* 3.4*  3.4* 4.7*        FEN / GI:   -- Daily CMPs  -- NGT in place   -- Replace lytes as needed  -- Nutrition: NPO, TF (Novasource Renal) at goal 30 ml/hr   -- GI PPX   -- Nutrition following; appreciate recs  -- Continue psyllium      ID:    #Ayaz's gangrene  s/p OR debridement for nec fascitis. R groin tissue with proteus, sensitive to ceftriaxone. Growing bacteroids as well.   -- Abx: ceftriaxone and flagyl  -- ID following ; appreciate recs  -- Debridement and wound vac change 2/7  Recent Labs   Lab 02/06/24 0339 02/07/24 0308 02/08/24  0302   WBC 32.17* 31.47* 20.99*        Heme/Onc:  -- Daily CBC  -- Heparin DVT ppx  Recent Labs   Lab 02/01/24  2246 02/02/24 0335 02/06/24 0339 02/07/24 0308 02/08/24  0302   HGB  --    < > 7.9* 8.4* 7.3*   PLT  --    < > 246 280 342   APTT 27.8  --   --   --   --    INR 1.0  --   --   --   --     < > = values in this interval not displayed.        Endo:   #IDDM  Initially presented to OSH with DKA with latest A1C 10.4 AG Gap resolved  - Placed on insulin gtt 2/3: Transition IV insulin infusion at 1.8 u/hr with stepdown parameters   - Tfs held; SSI HELD as tube feeds are stopped or BG < 100  - q4h BG monitoring while NPO  - Resume Novolog to 10 units units q 4 hrs when tube feeds restarted   - Moderate dose SSI PRN  - Endocrine following, appreciate recs        PPx:   Feeding: NPO  Analgesia/Sedation: See above  Thromboembolic prevention: SQH   HOB >30: Y  Stress Ulcer ppx: Famotidine  Glucose control: Critical care goal 140-180 g/dl, Insulin gtt, kermit novolog, SSI, Endo following  Bowel reg: psyllium  Invasive Lines/Drains/Airway: Glynn, ETT, Art line, Trialysis, PIV x2, Rectal tube      Dispo/Code Status/Palliative:   -- SICU / Full Code   -- Pending CTH read and JAY for neuro-prognosis/stroke.           David Stokes MD  Critical Care - Surgery  [facility]

## 2024-02-08 NOTE — PROGRESS NOTES
[facility]ensive [facility]  Infectious Disease  Progress Note    Patient Name: Sarah Saravia  MRN: [id number]  Admission Date: 1/29/2024  Length of Stay: 10 days  Attending Physician: Steve Cole MD  [facility]    Isolation Status: No active isolations  Assessment/Plan:      Neuro  Ac isch multi vasc territories stroke  Multiple acute embolic strokes noted on MRI. TTE without evidence of infective endocarditis. Blood cultures no growth.  Low suspicion for infective endocarditis however agree with work up detailed by vascular neurology. Will follow up JAY results.    Renal/  * Ayaz's gangrene  I have reviewed hospital notes from  SICU service and other specialty providers. I have also reviewed CBC, CMP/BMP,  cultures and imaging with my interpretation as documented.     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 Operative cultures showing P mirabilis. She is afebrile, and with ongoing leukocytosis. S/P wound VAC exchange on 2/6.  Continue ceftriaxone 2 gm IV daily.  Continue metronidazole.   Will follow up culture results.  Surgical debridements as indicated for non viable tissue as per Surgical Service.  Discussed management plan with the staff and/or members from SICU and General Surgery service.           Anticipated Disposition: unknown    Thank you for your consult. I will follow-up with patient. Please contact us if you have any additional questions.    Devika Andujar MD  Infectious Disease  [facility]    50 minutes of total time spent on the encounter, which includes face to face time and non-face to face time preparing to see the patient (eg, review of tests), obtaining and/or reviewing separately obtained history, documenting clinical information in the electronic or other health record, independently interpreting results (not separately reported) and communicating results to the patient/family/caregiver,  or care coordination (not separately reported).     Subjective:     Principal Problem:Ayaz's gangrene    HPI: A 53-year-old woman with morbid obesity as evidenced by a BMI of 50 3 (documented as 58 at [facility] [place]) who originally presented to [facility] with a wound to her posterior thigh, vomiting, and diarrhea for 3-4 days prior to admission.  On evaluation in [facility] ED she was noted to be afebrile tachycardic, and hypertensive.  Laboratory workup at that time revealed leukocytosis, elevated creatinine, elevated lactic acid, and elevated CRP.  Additionally it showed hyperglycemia with anion gap acidosis consistent with diabetic ketoacidosis.  CT imaging of the abdomen showed extensive soft tissue air throughout the right groin and inguinal region extending to the right lower quadrant of the anterior abdominal wall and medial aspect of the right thigh concerning for gas-forming infection.  She was admitted to hospital medicine service and taken to the OR by General surgery for debridement.  Empiric antibiotics with Zosyn, clindamycin, and vancomycin was started.  She underwent incision and drainage with debridement of the soft tissues down to the muscular fascia on 01/29.  She was then subsequently taken back to the OR on 01/31 where she underwent irrigation and debridement of the wound and insertion of a triple-lumen temporary hemodialysis catheter.  She was subsequently transitioned to meropenem and clindamycin while in [facility].  Unfortunately her hospital course was complicated by acute respiratory failure requiring intubation with mechanical ventilation and worsening ALVARADO prompting initiation of renal replacement therapy.  She was transferred to [facility] [place] [facility] for higher level of care.  Upon transfer the patient's antibiotic therapy was deescalated to ceftriaxone.    Infectious disease is consulted for Antibiotic management: currently on merem, concern for nec fasciitis          Interval History: ".   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 Taken to OR on 2/2 for debridement of the right buttocks and groin (#3). Evaluated by Neurology on 2/3 for encephalopathy. CT head with remote infarcts. Not withdrawing to pain on the morning of 2/5.    MRI done on 2/6 with embolic infarcts. S/P wound VAC exchange on 2/6. MRI brain revealed multiple embolic infarcts. Awaiting JAY.    Review of Systems   Unable to perform ROS: Intubated     Objective:     Vital Signs (Most Recent):  Temp: 98.1 °F (36.7 °C) (02/08/24 0800)  Pulse: 87 (02/08/24 1506)  Resp: 18 (02/08/24 1506)  BP: (!) 174/74 (02/08/24 1506)  SpO2: 99 % (02/08/24 1506) Vital Signs (24h Range):  Temp:  [98.1 °F (36.7 °C)-98.6 °F (37 °C)] 98.1 °F (36.7 °C)  Pulse:  [] 87  Resp:  [13-27] 18  SpO2:  [97 %-100 %] 99 %  BP: (139-178)/(63-93) 174/74  Arterial Line BP: (130-181)/(58-89) 149/64     Weight: (!) 153.3 kg (338 lb)  Body mass index is 56.25 kg/m².    Estimated Creatinine Clearance: 20.9 mL/min (A) (based on SCr of 4.7 mg/dL (H)).     Physical Exam  Vitals and nursing note reviewed.   Constitutional:       Appearance: She is well-developed. She is ill-appearing.      Interventions: She is intubated.   HENT:      Head: Normocephalic and atraumatic.      Right Ear: External ear normal.      Left Ear: External ear normal.   Eyes:      General: No scleral icterus.        Right eye: No discharge.         Left eye: No discharge.      Conjunctiva/sclera: Conjunctivae normal.   Cardiovascular:      Rate and Rhythm: Normal rate and regular rhythm.      Heart sounds: Normal heart sounds. No murmur heard.     No friction rub. No gallop.   Pulmonary:      Effort: Pulmonary effort is normal. No respiratory distress. She is intubated.      Breath sounds: No stridor. Rhonchi present. No wheezing or rales.   Abdominal:      General: Bowel sounds are normal.   Musculoskeletal:         General: No tenderness.    Skin:     General: Skin is warm and dry.      Comments: Wound VAC on right groin area   Neurological:      Mental Status: She is lethargic.      Comments: Opens eyes and shakes legs however not purposeful. Does not follow commands.          Significant Labs: BMP:   Recent Labs   Lab 02/08/24 0302   *   *   K 4.4      CO2 16*   BUN 49*   CREATININE 4.7*   CALCIUM 8.2*   MG 2.3       CBC:   Recent Labs   Lab 02/07/24 0308 02/08/24 0302   WBC 31.47* 20.99*   HGB 8.4* 7.3*   HCT 25.7* 22.8*    342       Microbiology Results (last 7 days)       Procedure Component Value Units Date/Time    Culture, Anaerobe [[id number]]  (Abnormal) Collected: 01/30/24 0228    Order Status: Completed Specimen: Wound from Groin Updated: 02/07/24 0748     Anaerobic Culture No anaerobes isolated      BACTEROIDES SPECIES  Few      Narrative:      Right groin tissue    Culture, Anaerobe [[id number]] Collected: 01/30/24 0219    Order Status: Completed Specimen: Abscess from Groin Updated: 02/07/24 0748     Anaerobic Culture No anaerobes isolated    Narrative:      Right groin abcess    Blood culture x two cultures. Draw prior to antibiotics. [[id number]] Collected: 01/29/24 2118    Order Status: Completed Specimen: Blood from Peripheral, Antecubital, Left Updated: 02/02/24 2303     Blood Culture, Routine No Growth after 4 days.    Narrative:      Aerobic and anaerobic    Blood culture x two cultures. Draw prior to antibiotics. [[id number]] Collected: 01/29/24 1929    Order Status: Completed Specimen: Blood from Peripheral, Antecubital, Right Updated: 02/02/24 2103     Blood Culture, Routine No Growth after 4 days.    Narrative:      Aerobic and anaerobic    Aerobic culture [[id number]]  (Abnormal)  (Susceptibility) Collected: 01/30/24 0228    Order Status: Completed Specimen: Wound from Groin Updated: 02/02/24 0746     Aerobic Bacterial Culture PROTEUS MIRABILIS  Moderate      Narrative:      Right groin tissue     Aerobic culture [[id number]]  (Abnormal)  (Susceptibility) Collected: 01/30/24 0219    Order Status: Completed Specimen: Abscess from Groin Updated: 02/02/24 0746     Aerobic Bacterial Culture PROTEUS MIRABILIS  Moderate      Narrative:      Right groin abcess            Significant Imaging: I have reviewed all pertinent imaging results/findings within the past 24 hours.

## 2024-02-08 NOTE — ASSESSMENT & PLAN NOTE
Transfer from [facility]. Admitted for fourniers gangrene. Initiated HD on 1/31. ALVARADO 2/2 ATN in setting of septic shock. Arrived with CR 3.8. Unknown baseline.  Plan to OR today. Net -1.3L.OR today for debridement with possible closure and wound vac placement     ALVARADO most likley ATN in setting of septic shock     Plan/Recommendation  -Will plan for HD today for clearance and volume management.   -Will plan for Lasix challenge tomorrow, recommend 120 mg IV x 1 on 2/9/24.  -Will eval RRT needs daily   -Daily RFP   -Strict I&Os  -Avoid nephrotoxins, if possible

## 2024-02-08 NOTE — NURSING
Holding TF despite pt on Insulin gtt due to JAY this AM per SICU resident order. Attempted to reach out to endocrine for further intervention with no response. Will continue monitoring BSG and will attempt to reach out to Endocrine in AM.  Updated: received response from Endocrine, juanjo to continue insulin gtt while holding TF, and juanjo to hold schedule insulin 10u subq. RN to relay message to dayshift.

## 2024-02-09 ENCOUNTER — ANESTHESIA (OUTPATIENT)
Dept: SURGERY | Facility: HOSPITAL | Age: 54
DRG: 004 | End: 2024-02-09
Payer: MEDICAID

## 2024-02-09 LAB
ALBUMIN SERPL BCP-MCNC: 1.7 G/DL (ref 3.5–5.2)
ALBUMIN SERPL BCP-MCNC: 1.8 G/DL (ref 3.5–5.2)
ALP SERPL-CCNC: 129 U/L (ref 55–135)
ALT SERPL W/O P-5'-P-CCNC: 10 U/L (ref 10–44)
ANION GAP SERPL CALC-SCNC: 12 MMOL/L (ref 8–16)
ANION GAP SERPL CALC-SCNC: 8 MMOL/L (ref 8–16)
ANISOCYTOSIS BLD QL SMEAR: SLIGHT
ANISOCYTOSIS BLD QL SMEAR: SLIGHT
AST SERPL-CCNC: 31 U/L (ref 10–40)
BASOPHILS # BLD AUTO: ABNORMAL K/UL (ref 0–0.2)
BASOPHILS # BLD AUTO: ABNORMAL K/UL (ref 0–0.2)
BASOPHILS NFR BLD: 0 % (ref 0–1.9)
BASOPHILS NFR BLD: 0 % (ref 0–1.9)
BILIRUB SERPL-MCNC: 0.2 MG/DL (ref 0.1–1)
BUN SERPL-MCNC: 37 MG/DL (ref 6–20)
BUN SERPL-MCNC: 59 MG/DL (ref 6–20)
CALCIUM SERPL-MCNC: 7.9 MG/DL (ref 8.7–10.5)
CALCIUM SERPL-MCNC: 8 MG/DL (ref 8.7–10.5)
CHLORIDE SERPL-SCNC: 106 MMOL/L (ref 95–110)
CHLORIDE SERPL-SCNC: 106 MMOL/L (ref 95–110)
CO2 SERPL-SCNC: 16 MMOL/L (ref 23–29)
CO2 SERPL-SCNC: 19 MMOL/L (ref 23–29)
CREAT SERPL-MCNC: 3.3 MG/DL (ref 0.5–1.4)
CREAT SERPL-MCNC: 4.4 MG/DL (ref 0.5–1.4)
DIFFERENTIAL METHOD BLD: ABNORMAL
DIFFERENTIAL METHOD BLD: ABNORMAL
EOSINOPHIL # BLD AUTO: ABNORMAL K/UL (ref 0–0.5)
EOSINOPHIL # BLD AUTO: ABNORMAL K/UL (ref 0–0.5)
EOSINOPHIL NFR BLD: 2 % (ref 0–8)
EOSINOPHIL NFR BLD: 2 % (ref 0–8)
ERYTHROCYTE [DISTWIDTH] IN BLOOD BY AUTOMATED COUNT: 17.6 % (ref 11.5–14.5)
ERYTHROCYTE [DISTWIDTH] IN BLOOD BY AUTOMATED COUNT: 17.6 % (ref 11.5–14.5)
EST. GFR  (NO RACE VARIABLE): 11.4 ML/MIN/1.73 M^2
EST. GFR  (NO RACE VARIABLE): 16.1 ML/MIN/1.73 M^2
GIANT PLATELETS BLD QL SMEAR: PRESENT
GIANT PLATELETS BLD QL SMEAR: PRESENT
GLUCOSE SERPL-MCNC: 106 MG/DL (ref 70–110)
GLUCOSE SERPL-MCNC: 150 MG/DL (ref 70–110)
HCT VFR BLD AUTO: 22.7 % (ref 37–48.5)
HCT VFR BLD AUTO: 22.7 % (ref 37–48.5)
HGB BLD-MCNC: 7.1 G/DL (ref 12–16)
HGB BLD-MCNC: 7.1 G/DL (ref 12–16)
HYPOCHROMIA BLD QL SMEAR: ABNORMAL
HYPOCHROMIA BLD QL SMEAR: ABNORMAL
IMM GRANULOCYTES # BLD AUTO: ABNORMAL K/UL (ref 0–0.04)
IMM GRANULOCYTES # BLD AUTO: ABNORMAL K/UL (ref 0–0.04)
IMM GRANULOCYTES NFR BLD AUTO: ABNORMAL % (ref 0–0.5)
IMM GRANULOCYTES NFR BLD AUTO: ABNORMAL % (ref 0–0.5)
LYMPHOCYTES # BLD AUTO: ABNORMAL K/UL (ref 1–4.8)
LYMPHOCYTES # BLD AUTO: ABNORMAL K/UL (ref 1–4.8)
LYMPHOCYTES NFR BLD: 14 % (ref 18–48)
LYMPHOCYTES NFR BLD: 14 % (ref 18–48)
MAGNESIUM SERPL-MCNC: 2.1 MG/DL (ref 1.6–2.6)
MCH RBC QN AUTO: 26.5 PG (ref 27–31)
MCH RBC QN AUTO: 26.5 PG (ref 27–31)
MCHC RBC AUTO-ENTMCNC: 31.3 G/DL (ref 32–36)
MCHC RBC AUTO-ENTMCNC: 31.3 G/DL (ref 32–36)
MCV RBC AUTO: 85 FL (ref 82–98)
MCV RBC AUTO: 85 FL (ref 82–98)
METAMYELOCYTES NFR BLD MANUAL: 3 %
METAMYELOCYTES NFR BLD MANUAL: 3 %
MONOCYTES # BLD AUTO: ABNORMAL K/UL (ref 0.3–1)
MONOCYTES # BLD AUTO: ABNORMAL K/UL (ref 0.3–1)
MONOCYTES NFR BLD: 7 % (ref 4–15)
MONOCYTES NFR BLD: 7 % (ref 4–15)
MYELOCYTES NFR BLD MANUAL: 4 %
MYELOCYTES NFR BLD MANUAL: 4 %
NEUTROPHILS NFR BLD: 63 % (ref 38–73)
NEUTROPHILS NFR BLD: 63 % (ref 38–73)
NEUTS BAND NFR BLD MANUAL: 7 %
NEUTS BAND NFR BLD MANUAL: 7 %
NRBC BLD-RTO: 0 /100 WBC
NRBC BLD-RTO: 0 /100 WBC
OVALOCYTES BLD QL SMEAR: ABNORMAL
OVALOCYTES BLD QL SMEAR: ABNORMAL
PHOSPHATE SERPL-MCNC: 6.5 MG/DL (ref 2.7–4.5)
PHOSPHATE SERPL-MCNC: 8.6 MG/DL (ref 2.7–4.5)
PLATELET # BLD AUTO: 387 K/UL (ref 150–450)
PLATELET # BLD AUTO: 387 K/UL (ref 150–450)
PLATELET BLD QL SMEAR: ABNORMAL
PLATELET BLD QL SMEAR: ABNORMAL
PMV BLD AUTO: 10.1 FL (ref 9.2–12.9)
PMV BLD AUTO: 10.1 FL (ref 9.2–12.9)
POCT GLUCOSE: 113 MG/DL (ref 70–110)
POCT GLUCOSE: 143 MG/DL (ref 70–110)
POCT GLUCOSE: 148 MG/DL (ref 70–110)
POIKILOCYTOSIS BLD QL SMEAR: SLIGHT
POIKILOCYTOSIS BLD QL SMEAR: SLIGHT
POLYCHROMASIA BLD QL SMEAR: ABNORMAL
POLYCHROMASIA BLD QL SMEAR: ABNORMAL
POTASSIUM SERPL-SCNC: 4.1 MMOL/L (ref 3.5–5.1)
POTASSIUM SERPL-SCNC: 4.2 MMOL/L (ref 3.5–5.1)
PROT SERPL-MCNC: 7.1 G/DL (ref 6–8.4)
RBC # BLD AUTO: 2.68 M/UL (ref 4–5.4)
RBC # BLD AUTO: 2.68 M/UL (ref 4–5.4)
SODIUM SERPL-SCNC: 133 MMOL/L (ref 136–145)
SODIUM SERPL-SCNC: 134 MMOL/L (ref 136–145)
TARGETS BLD QL SMEAR: ABNORMAL
TARGETS BLD QL SMEAR: ABNORMAL
TOXIC GRANULES BLD QL SMEAR: PRESENT
TOXIC GRANULES BLD QL SMEAR: PRESENT
WBC # BLD AUTO: 16.02 K/UL (ref 3.9–12.7)
WBC # BLD AUTO: 16.02 K/UL (ref 3.9–12.7)

## 2024-02-09 PROCEDURE — 99900035 HC TECH TIME PER 15 MIN (STAT)

## 2024-02-09 PROCEDURE — 83735 ASSAY OF MAGNESIUM: CPT | Performed by: STUDENT IN AN ORGANIZED HEALTH CARE EDUCATION/TRAINING PROGRAM

## 2024-02-09 PROCEDURE — 94003 VENT MGMT INPAT SUBQ DAY: CPT

## 2024-02-09 PROCEDURE — 20000000 HC ICU ROOM

## 2024-02-09 PROCEDURE — 99233 SBSQ HOSP IP/OBS HIGH 50: CPT | Mod: ,,, | Performed by: INTERNAL MEDICINE

## 2024-02-09 PROCEDURE — 84100 ASSAY OF PHOSPHORUS: CPT | Performed by: STUDENT IN AN ORGANIZED HEALTH CARE EDUCATION/TRAINING PROGRAM

## 2024-02-09 PROCEDURE — 99900026 HC AIRWAY MAINTENANCE (STAT)

## 2024-02-09 PROCEDURE — 97608 NEG PRS WND THER NDME>50SQCM: CPT | Mod: 59,,, | Performed by: STUDENT IN AN ORGANIZED HEALTH CARE EDUCATION/TRAINING PROGRAM

## 2024-02-09 PROCEDURE — 94761 N-INVAS EAR/PLS OXIMETRY MLT: CPT

## 2024-02-09 PROCEDURE — 93320 DOPPLER ECHO COMPLETE: CPT | Mod: 26,,, | Performed by: ANESTHESIOLOGY

## 2024-02-09 PROCEDURE — 25000003 PHARM REV CODE 250: Performed by: STUDENT IN AN ORGANIZED HEALTH CARE EDUCATION/TRAINING PROGRAM

## 2024-02-09 PROCEDURE — 27000923 HC TRIALYSIS CATHETER, ANY SIZE

## 2024-02-09 PROCEDURE — 63600175 PHARM REV CODE 636 W HCPCS

## 2024-02-09 PROCEDURE — 99232 SBSQ HOSP IP/OBS MODERATE 35: CPT | Mod: ,,, | Performed by: NURSE PRACTITIONER

## 2024-02-09 PROCEDURE — 63600175 PHARM REV CODE 636 W HCPCS: Performed by: STUDENT IN AN ORGANIZED HEALTH CARE EDUCATION/TRAINING PROGRAM

## 2024-02-09 PROCEDURE — 99232 SBSQ HOSP IP/OBS MODERATE 35: CPT | Mod: ,,, | Performed by: PHYSICIAN ASSISTANT

## 2024-02-09 PROCEDURE — 97598 DBRDMT OPN WND ADDL 20CM/<: CPT | Mod: ,,, | Performed by: STUDENT IN AN ORGANIZED HEALTH CARE EDUCATION/TRAINING PROGRAM

## 2024-02-09 PROCEDURE — 27100171 HC OXYGEN HIGH FLOW UP TO 24 HOURS

## 2024-02-09 PROCEDURE — 85027 COMPLETE CBC AUTOMATED: CPT

## 2024-02-09 PROCEDURE — 37000008 HC ANESTHESIA 1ST 15 MINUTES: Performed by: STUDENT IN AN ORGANIZED HEALTH CARE EDUCATION/TRAINING PROGRAM

## 2024-02-09 PROCEDURE — 99291 CRITICAL CARE FIRST HOUR: CPT | Mod: 24,,, | Performed by: STUDENT IN AN ORGANIZED HEALTH CARE EDUCATION/TRAINING PROGRAM

## 2024-02-09 PROCEDURE — 27201423 OPTIME MED/SURG SUP & DEVICES STERILE SUPPLY: Performed by: STUDENT IN AN ORGANIZED HEALTH CARE EDUCATION/TRAINING PROGRAM

## 2024-02-09 PROCEDURE — 80053 COMPREHEN METABOLIC PANEL: CPT

## 2024-02-09 PROCEDURE — 36000706: Performed by: STUDENT IN AN ORGANIZED HEALTH CARE EDUCATION/TRAINING PROGRAM

## 2024-02-09 PROCEDURE — 25000242 PHARM REV CODE 250 ALT 637 W/ HCPCS

## 2024-02-09 PROCEDURE — 63600175 PHARM REV CODE 636 W HCPCS: Performed by: HOSPITALIST

## 2024-02-09 PROCEDURE — 93312 ECHO TRANSESOPHAGEAL: CPT | Mod: 26,59,, | Performed by: ANESTHESIOLOGY

## 2024-02-09 PROCEDURE — D9220A PRA ANESTHESIA: Mod: CRNA,,, | Performed by: STUDENT IN AN ORGANIZED HEALTH CARE EDUCATION/TRAINING PROGRAM

## 2024-02-09 PROCEDURE — D9220A PRA ANESTHESIA: Mod: ANES,,, | Performed by: ANESTHESIOLOGY

## 2024-02-09 PROCEDURE — 80069 RENAL FUNCTION PANEL: CPT | Performed by: NURSE PRACTITIONER

## 2024-02-09 PROCEDURE — 25000003 PHARM REV CODE 250

## 2024-02-09 PROCEDURE — P9047 ALBUMIN (HUMAN), 25%, 50ML: HCPCS | Mod: JZ,JG | Performed by: STUDENT IN AN ORGANIZED HEALTH CARE EDUCATION/TRAINING PROGRAM

## 2024-02-09 PROCEDURE — 63600175 PHARM REV CODE 636 W HCPCS: Mod: JZ,JG | Performed by: INTERNAL MEDICINE

## 2024-02-09 PROCEDURE — 36000707: Performed by: STUDENT IN AN ORGANIZED HEALTH CARE EDUCATION/TRAINING PROGRAM

## 2024-02-09 PROCEDURE — 85007 BL SMEAR W/DIFF WBC COUNT: CPT

## 2024-02-09 PROCEDURE — 37000009 HC ANESTHESIA EA ADD 15 MINS: Performed by: STUDENT IN AN ORGANIZED HEALTH CARE EDUCATION/TRAINING PROGRAM

## 2024-02-09 PROCEDURE — 80100014 HC HEMODIALYSIS 1:1

## 2024-02-09 PROCEDURE — 63600175 PHARM REV CODE 636 W HCPCS: Mod: JZ,JG

## 2024-02-09 PROCEDURE — 27200966 HC CLOSED SUCTION SYSTEM

## 2024-02-09 PROCEDURE — 93325 DOPPLER ECHO COLOR FLOW MAPG: CPT | Mod: 26,,, | Performed by: ANESTHESIOLOGY

## 2024-02-09 PROCEDURE — 97597 DBRDMT OPN WND 1ST 20 CM/<: CPT | Mod: ,,, | Performed by: STUDENT IN AN ORGANIZED HEALTH CARE EDUCATION/TRAINING PROGRAM

## 2024-02-09 RX ORDER — ONDANSETRON HYDROCHLORIDE 2 MG/ML
INJECTION, SOLUTION INTRAVENOUS
Status: DISCONTINUED | OUTPATIENT
Start: 2024-02-09 | End: 2024-02-09

## 2024-02-09 RX ORDER — FUROSEMIDE 10 MG/ML
80 INJECTION INTRAMUSCULAR; INTRAVENOUS ONCE
Status: COMPLETED | OUTPATIENT
Start: 2024-02-09 | End: 2024-02-09

## 2024-02-09 RX ORDER — CARVEDILOL 12.5 MG/1
12.5 TABLET ORAL 2 TIMES DAILY
Status: DISCONTINUED | OUTPATIENT
Start: 2024-02-09 | End: 2024-02-12

## 2024-02-09 RX ORDER — VECURONIUM BROMIDE FOR INJECTION 1 MG/ML
INJECTION, POWDER, LYOPHILIZED, FOR SOLUTION INTRAVENOUS
Status: DISCONTINUED | OUTPATIENT
Start: 2024-02-09 | End: 2024-02-09

## 2024-02-09 RX ORDER — INSULIN ASPART 100 [IU]/ML
6 INJECTION, SOLUTION INTRAVENOUS; SUBCUTANEOUS EVERY 4 HOURS
Status: DISCONTINUED | OUTPATIENT
Start: 2024-02-09 | End: 2024-03-03

## 2024-02-09 RX ORDER — ALBUMIN HUMAN 250 G/1000ML
25 SOLUTION INTRAVENOUS ONCE
Status: COMPLETED | OUTPATIENT
Start: 2024-02-09 | End: 2024-02-09

## 2024-02-09 RX ORDER — FUROSEMIDE 10 MG/ML
120 INJECTION INTRAMUSCULAR; INTRAVENOUS ONCE
Status: COMPLETED | OUTPATIENT
Start: 2024-02-09 | End: 2024-02-09

## 2024-02-09 RX ORDER — FENTANYL CITRATE 50 UG/ML
INJECTION, SOLUTION INTRAMUSCULAR; INTRAVENOUS
Status: DISCONTINUED | OUTPATIENT
Start: 2024-02-09 | End: 2024-02-09

## 2024-02-09 RX ORDER — PROPOFOL 10 MG/ML
VIAL (ML) INTRAVENOUS
Status: DISCONTINUED | OUTPATIENT
Start: 2024-02-09 | End: 2024-02-09

## 2024-02-09 RX ORDER — ROCURONIUM BROMIDE 10 MG/ML
INJECTION, SOLUTION INTRAVENOUS
Status: DISCONTINUED | OUTPATIENT
Start: 2024-02-09 | End: 2024-02-09

## 2024-02-09 RX ORDER — SEVELAMER CARBONATE FOR ORAL SUSPENSION 800 MG/1
1.6 POWDER, FOR SUSPENSION ORAL
Status: DISCONTINUED | OUTPATIENT
Start: 2024-02-09 | End: 2024-02-21

## 2024-02-09 RX ORDER — MIDAZOLAM HYDROCHLORIDE 1 MG/ML
INJECTION INTRAMUSCULAR; INTRAVENOUS
Status: DISCONTINUED | OUTPATIENT
Start: 2024-02-09 | End: 2024-02-09

## 2024-02-09 RX ORDER — CARVEDILOL 12.5 MG/1
12.5 TABLET ORAL 2 TIMES DAILY
Status: DISCONTINUED | OUTPATIENT
Start: 2024-02-09 | End: 2024-02-09

## 2024-02-09 RX ADMIN — PSYLLIUM HUSK 1 PACKET: 3.4 POWDER ORAL at 09:02

## 2024-02-09 RX ADMIN — HEPARIN SODIUM 7500 UNITS: 5000 INJECTION INTRAVENOUS; SUBCUTANEOUS at 01:02

## 2024-02-09 RX ADMIN — PROPOFOL 30 MG: 10 INJECTION, EMULSION INTRAVENOUS at 02:02

## 2024-02-09 RX ADMIN — FUROSEMIDE 120 MG: 10 INJECTION, SOLUTION INTRAVENOUS at 09:02

## 2024-02-09 RX ADMIN — ROCURONIUM BROMIDE 30 MG: 10 INJECTION, SOLUTION INTRAVENOUS at 02:02

## 2024-02-09 RX ADMIN — HYDROMORPHONE HYDROCHLORIDE 0.5 MG: 1 INJECTION, SOLUTION INTRAMUSCULAR; INTRAVENOUS; SUBCUTANEOUS at 04:02

## 2024-02-09 RX ADMIN — ROCURONIUM BROMIDE 20 MG: 10 INJECTION, SOLUTION INTRAVENOUS at 02:02

## 2024-02-09 RX ADMIN — AMLODIPINE BESYLATE 10 MG: 10 TABLET ORAL at 09:02

## 2024-02-09 RX ADMIN — METRONIDAZOLE 500 MG: 5 INJECTION, SOLUTION INTRAVENOUS at 10:02

## 2024-02-09 RX ADMIN — LABETALOL HYDROCHLORIDE 10 MG: 5 INJECTION, SOLUTION INTRAVENOUS at 05:02

## 2024-02-09 RX ADMIN — PROPOFOL 50 MG: 10 INJECTION, EMULSION INTRAVENOUS at 02:02

## 2024-02-09 RX ADMIN — ALBUMIN (HUMAN) 25 G: 12.5 SOLUTION INTRAVENOUS at 01:02

## 2024-02-09 RX ADMIN — ACETAMINOPHEN 650 MG: 325 TABLET ORAL at 06:02

## 2024-02-09 RX ADMIN — SODIUM CHLORIDE 0.7 UNITS/HR: 9 INJECTION, SOLUTION INTRAVENOUS at 02:02

## 2024-02-09 RX ADMIN — ACETAMINOPHEN 650 MG: 325 TABLET ORAL at 05:02

## 2024-02-09 RX ADMIN — MIDAZOLAM HYDROCHLORIDE 2 MG: 2 INJECTION, SOLUTION INTRAMUSCULAR; INTRAVENOUS at 02:02

## 2024-02-09 RX ADMIN — HEPARIN SODIUM 7500 UNITS: 5000 INJECTION INTRAVENOUS; SUBCUTANEOUS at 06:02

## 2024-02-09 RX ADMIN — METRONIDAZOLE 500 MG: 5 INJECTION, SOLUTION INTRAVENOUS at 02:02

## 2024-02-09 RX ADMIN — ALTEPLASE 2 MG: 2.2 INJECTION, POWDER, LYOPHILIZED, FOR SOLUTION INTRAVENOUS at 03:02

## 2024-02-09 RX ADMIN — VECURONIUM BROMIDE 10 MG: 10 INJECTION, POWDER, LYOPHILIZED, FOR SOLUTION INTRAVENOUS at 02:02

## 2024-02-09 RX ADMIN — PROPOFOL 50 MG: 10 INJECTION, EMULSION INTRAVENOUS at 03:02

## 2024-02-09 RX ADMIN — CARVEDILOL 12.5 MG: 12.5 TABLET, FILM COATED ORAL at 09:02

## 2024-02-09 RX ADMIN — SODIUM CHLORIDE: 0.9 INJECTION, SOLUTION INTRAVENOUS at 02:02

## 2024-02-09 RX ADMIN — ONDANSETRON 4 MG: 2 INJECTION INTRAMUSCULAR; INTRAVENOUS at 03:02

## 2024-02-09 RX ADMIN — HYDRALAZINE HYDROCHLORIDE 10 MG: 20 INJECTION, SOLUTION INTRAMUSCULAR; INTRAVENOUS at 12:02

## 2024-02-09 RX ADMIN — METRONIDAZOLE 500 MG: 5 INJECTION, SOLUTION INTRAVENOUS at 05:02

## 2024-02-09 RX ADMIN — SUGAMMADEX 400 MG: 100 INJECTION, SOLUTION INTRAVENOUS at 03:02

## 2024-02-09 RX ADMIN — HYDRALAZINE HYDROCHLORIDE 10 MG: 20 INJECTION, SOLUTION INTRAMUSCULAR; INTRAVENOUS at 05:02

## 2024-02-09 RX ADMIN — FAMOTIDINE 20 MG: 20 TABLET, FILM COATED ORAL at 09:02

## 2024-02-09 RX ADMIN — FUROSEMIDE 80 MG: 10 INJECTION, SOLUTION INTRAMUSCULAR; INTRAVENOUS at 01:02

## 2024-02-09 RX ADMIN — HEPARIN SODIUM 7500 UNITS: 5000 INJECTION INTRAVENOUS; SUBCUTANEOUS at 09:02

## 2024-02-09 RX ADMIN — ACETAMINOPHEN 650 MG: 325 TABLET ORAL at 12:02

## 2024-02-09 RX ADMIN — CEFTRIAXONE 2 G: 2 INJECTION, POWDER, FOR SOLUTION INTRAMUSCULAR; INTRAVENOUS at 10:02

## 2024-02-09 RX ADMIN — FENTANYL CITRATE 100 MCG: 50 INJECTION, SOLUTION INTRAMUSCULAR; INTRAVENOUS at 02:02

## 2024-02-09 NOTE — PROGRESS NOTES
02/09/24 0400   Vital Signs   Temp 98.6 °F (37 °C)   Temp Source Oral   Pulse 91   Heart Rate Source Monitor   Resp 16   SpO2 99 %   Pulse Oximetry Type Continuous   Oxygen Concentration (%) 40   Device (Oxygen Therapy) ventilator   BP (!) 163/68   MAP (mmHg) 98   BP Location Left forearm   BP Method Automatic   Patient Position Lying     Pts catheter initially was not working. TPA instilled.  Both ports aspirated after the dwell.  Bedside HD1:1 tx started aseptically.

## 2024-02-09 NOTE — PLAN OF CARE
Patient returned to room from OR. Underwent JAY with no significant findings - full report in image tab - as well as debridement and wound vac placement. No significant blood loss.     Kristopher Augustine MD  [employer] - General Surgery - PGY 2

## 2024-02-09 NOTE — ASSESSMENT & PLAN NOTE
I have reviewed hospital notes from  SICU service and other specialty providers. I have also reviewed CBC, CMP/BMP,  cultures and imaging with my interpretation as documented.     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 Operative cultures showing P mirabilis. S/P wound VAC exchange on 2/6. Afebrile and with down trend in leukocytosis.  Continue ceftriaxone 2 gm IV daily.  Continue metronidazole.   Will follow up culture results.  Surgical debridements as indicated for non viable tissue as per Surgical Service.  Discussed management plan with the staff and/or members from SICU and General Surgery service.

## 2024-02-09 NOTE — PLAN OF CARE
"Please link this note to the resident's progress note. This note serves as the attestation.    In brief, Sarah Saravia is a 53 year-old woman with a history of hypertension, morbid obesity, renal insufficiency, and type 2 diabetes who was admitted as a transfer for necrotizing soft tissue infection.  She has undergone debridements for treatment.  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     Critical Care issues in this patient include:    Ayaz's gangrene              * Underwent debridement/wound vac exchange on 2/6. Plan for another look on today. Continue rocephin and flagyl. ID consulted and are following. Appreciate their recommendations. Wound cultures growing pansensitive proteus mirabilis and Bacteroides. Blood cultures have been NGTD. They agree with obtained a JAY.     Encephalopathy, metabolic              * Initially thought to be due to sepsis and severe infection. Neurology consulted and are following. EEG done and shows severe slowing and no seizures. MRI brain done recently revealed "several scattered punctate acute infarcts throughout the bilateral frontal lobes, centrum semiovale, basal ganglia, corpus callosum, and cerebellar hemispheres" concerning for embolic source. Obtained echo to evaluate for cardiac vegetations or PFO; none noted. Consulted Vascular Neurology per Neurology (general). Appreciate their recs. CTA head and neck done and no significant occlusion noted. JAY not performed by Cardiology because they do not believe it'll change her treatment, which I disagree with. This will help with length of antibiotic therapy as well as prognostication for her family. Discussed with cardiac anesthesia team and they will perform JAY while she is in the OR today.     Acute renal failure with tubular necrosis              * ARF on CKD. Meza replaced and she is starting to make more urine. Keep meza in place for now. " Nephrology has been consulted and are following. Appreciate recs. They want to trial a lasix challenge. Last had HD on 2/8. Trialysis replaced 2/6 and functioning well without issues. Monitor electrolytes.     Type 2 diabetes mellitus with hyperglycemia              * Endocrinology consulted and are following. Appreciate their recs. Poorly controlled BG at baseline with recent Hgb A1c at 10.4%. Continue with hourly glucose checks and titrate insulin infusion per protocol.      Encounter for weaning the ventilator              * Remains on pressure support on the vent. Cannot extubate secondary to poor mental status. CXR and ABG pending this morning. Continue with lung protective ventilation. Keep HOB elevated 30 degrees.     Patient remains stable in the ICU. She has multiple infarcts noted on her recent MRI. Unclear of prognosis from a neurological standpoint. Palliative team have seen her and are following. Appreciate their recommendations. Continue tube feeds after she comes back from the OR. BMs no longer loose with fiber in place. Ok to remove FMS if she no longer has loose BMs. Continue GI and DVT prophylaxis. Continue ICU care.    Critical care time spent: 36 min    Critical care was time spent personally by me on the following activities: development of treatment plan with patient or surrogate and bedside caregivers, discussions with consultants, evaluation of patient's response to treatment, examination of patient, ordering and performing treatments and interventions, ordering and review of laboratory studies, ordering and review of radiographic studies, pulse oximetry, re-evaluation of patient's condition.  This critical care time did not overlap with that of any other provider or involve time for any procedures.      Karla Connelly MD, FACS  General Surgery and Surgical Critical Care  [facility]

## 2024-02-09 NOTE — ASSESSMENT & PLAN NOTE
Neuro/Psych:   #Acute Encephalopathy  CTH 2/3 with scattered hypoattenuation likely representing age indeterminate infarcts. EEG findings consistent with moderate-severe encephalopathy. MRI 2/6: Several scattered punctate acute infarcts throughout the bilateral frontal lobes, centrum semiovale, basal ganglia, corpus callosum, and cerebellar hemispheres.  CTA 2/6: Atherosclerotic plaquing of the carotid bifurcations and proximal ICAs with less than 50% proximal ICA stenosis by NASCET criteria . 2/7 CTH unchanged from prior CT. Ddx includes septic embolic vs inflammatory vs hypercoagulopathy.   -- Sedation: None  -- Pain: kermit tylenol, dialudid and oxy prn  -- GCS 7T: E2, V1T, M4; exam stable  -- Neurology following; appreciate recs  -- Neurovascular following; appreciate recs  -- Palliative following; GOC conversation 2/7; appreciate recs  -- Cardiology currently not recommending JAY as team does not feel it will change prognosis. Will continue to obtain JAY with cardiac anesthesia             Cards:   TTE 2/6 unremarkable.  -- HDS  -- goal SBP < 180, MAP >65  -- hypertensive, hydralazine and labetalol prns  -- Continue amlodipine 10mg daily;  coreg 6.25mg BID      Pulm:   #Acute Respiratory Failure  -- Intubated on spontaneous , Pressure support.  -- Goal O2 sat > 90%      Renal:  #ALVARADO on CKD  #ATN  Received HD 2/6 without issue. HD 2/9 with goal of removing 2L.   -- Nephrology following; appreciate recs  -- RRT as needed  -- Transition to Lasix 120mg challenge after HD  Recent Labs   Lab 02/07/24  0308 02/08/24  0302 02/09/24  0335   BUN 31*  31* 49* 59*   CREATININE 3.4*  3.4* 4.7* 4.4*        FEN / GI:   -- Daily CMPs  -- NGT in place   -- Replace lytes as needed  -- Nutrition: NPO, TF (Novasource Renal) at goal 30 ml/hr   -- GI PPX   -- Nutrition following; appreciate recs  -- Continue psyllium      ID:    #Ayaz's gangrene  s/p OR debridement for nec fascitis. R groin tissue with proteus, sensitive to  ceftriaxone. Growing bacteroids as well. Debridement and wound vac change 2/7  -- Abx: ceftriaxone and flagyl  -- ID following ; appreciate recs  -- OR today for further debridement   Recent Labs   Lab 02/07/24  0308 02/08/24  0302 02/09/24  0335   WBC 31.47* 20.99* 16.02*  16.02*        Heme/Onc:  -- Daily CBC  -- Heparin DVT ppx  Recent Labs   Lab 02/07/24 0308 02/08/24  0302 02/09/24  0335   HGB 8.4* 7.3* 7.1*  7.1*    342 387  387        Endo:   #IDDM  Initially presented to OSH with DKA with latest A1C 10.4 AG Gap resolved  - Placed on insulin gtt 2/3: Transition IV insulin infusion at 1.8 u/hr with stepdown parameters   - Tfs held; SSI HELD as tube feeds are stopped or BG < 100  - q4h BG monitoring while NPO  - Resume Novolog to 10 units units q 4 hrs when tube feeds restarted   - Moderate dose SSI PRN  - Endocrine following, appreciate recs        PPx:   Feeding: NPO  Analgesia/Sedation: See above  Thromboembolic prevention: SQH   HOB >30: Y  Stress Ulcer ppx: Famotidine  Glucose control: Critical care goal 140-180 g/dl, Insulin gtt, kermit novolog, SSI, Endo following  Bowel reg: psyllium  Invasive Lines/Drains/Airway: Glynn, ETT, Art line, Trialysis, PIV x2, Rectal tube      Dispo/Code Status/Palliative:   -- SICU / Full Code   -- OR this AM for further debridement  -- JAY for neuroprognosis/stroke

## 2024-02-09 NOTE — ANESTHESIA PROCEDURE NOTES
JAY    Diagnosis: stroke  Patient location during procedure: OR  Exam type: Baseline    Staffing  Performed: anesthesiologist     Anesthesiologist: Robbie Blanchard MD        Anesthesiologist Present  Yes      Setup & Induction  Patient preparation: bite block inserted  Probe Insertion: easy  Exam: completeDoppler Echo: 2D, 3D, color flow mapping, pulse wave Doppler and continuous wave Doppler.  Exam     Left Heart      Left Ventricle: cm, normal (men 4.2-5.9; women 3.8-5.2)  LV Wall Thickness (posterior wall):cm, moderately abnormal (men 1.4-1.6 cm; women 1.3-1.5 cm)    LVAD  Estimated Ejection Fraction: > 55% normal  Regional Wall Abnormalities: no RWMA            Right Heart  Right Ventricle: normal  Right Ventricle Function: normal  Right Atria:  normal    Intra Atrial Septum  PFO: no shunt by color flow doppler          Right Ventricle  Size: normal, Free Wall Thickness: normal    Aortic Valve:  Stenosis: none.  Morphology: trileaflet    Regurgitation: no aortic valve regurgitation      Mitral Valve:   Morphology:normal  Jet Description: none    Tricuspid Valve:  Morphology: normal  Regurgitation: none    Pulmonic Valve:  Morphology:normal  Regurgitation(color flow): none    Great Vessels  Ascending Aorta Atherosclerosis: 1=nl-min dz  Aortic Arch Atherosclerosis: 1=nl-min dz  Descending Aorta Atherosclerosis: 1=nl-min dz      Effusions none    SummaryFindings discussed with surgeon.    Other Findings   Normal biventricular systolic function, EF 55%  No evidence of vegetations  Trace-mild MR and TR

## 2024-02-09 NOTE — ASSESSMENT & PLAN NOTE
Transfer from [facility]. Admitted for fourniers gangrene. Initiated HD on 1/31. ALVARADO 2/2 ATN in setting of septic shock. Arrived with CR 3.8. Unknown baseline.  Plan to OR today. Net -1.3L.OR today for debridement with possible closure and wound vac placement     ALVARADO most likley ATN in setting of septic shock     Plan/Recommendation  -Will hold further HD at this time. 24 hr  mL. Will plan for diuretic challenge today and reassess the need for HD tomorrow. Will monitor for sings of renal recovery.   -recommend 120 mg IV x 1 today   -Daily RFP   -Strict I&Os  -Avoid nephrotoxins, if possible

## 2024-02-09 NOTE — PROGRESS NOTES
[facility] - Surgical Intensive Care  Nephrology  Progress Note    Patient Name: Sarah Saravia  MRN: [id number]  Admission Date: 1/29/2024  Hospital Length of Stay: 11 days  Attending Provider: Steve Cole MD   Primary Care Physician: Patricia [facility]  Principal Problem:Ayaz's gangrene    Subjective:     Interval History:   Plans for debridement in the OR today.   HD completed this AM. Net UF 2 L.   Net negative 1.6 L/24 hrs.     Review of patient's allergies indicates:  No Known Allergies  Current Facility-Administered Medications   Medication Frequency    0.9%  NaCl infusion PRN    0.9%  NaCl infusion Once    acetaminophen tablet 650 mg Q6H    albuterol-ipratropium 2.5 mg-0.5 mg/3 mL nebulizer solution 3 mL Q4H PRN    amLODIPine tablet 10 mg Daily    carvediloL tablet 12.5 mg BID    cefTRIAXone (ROCEPHIN) 2 g in dextrose 5 % in water (D5W) 100 mL IVPB (MB+) Q24H    dextrose 10 % infusion Continuous PRN    dextrose 10% bolus 125 mL 125 mL PRN    dextrose 10% bolus 250 mL 250 mL PRN    dextrose 5 % and 0.45 % NaCl infusion Continuous    famotidine tablet 20 mg Daily    glucagon (human recombinant) injection 1 mg PRN    glucose chewable tablet 16 g PRN    glucose chewable tablet 24 g PRN    heparin (porcine) injection 7,500 Units Q8H    hydrALAZINE injection 10 mg Q4H PRN    HYDROmorphone injection 0.5 mg Q4H PRN    insulin aspart U-100 pen 0-10 Units Q4H PRN    insulin aspart U-100 pen 6 Units Q4H    insulin regular in 0.9 % NaCl 100 unit/100 mL (1 unit/mL) infusion Continuous    labetalol 20 mg/4 mL (5 mg/mL) IV syring Q6H PRN    metronidazole IVPB 500 mg Q8H    naloxone 0.4 mg/mL injection 0.02 mg PRN    ondansetron injection 4 mg Q6H PRN    oxyCODONE 5 mg/5 mL solution 10 mg Q6H PRN    oxyCODONE 5 mg/5 mL solution 5 mg Q6H PRN    prochlorperazine injection Soln 5 mg Q6H PRN    psyllium husk (aspartame) 3.4 gram packet 1 packet BID    sevelamer carbonate pwpk 1.6 g TID WM    sodium  chloride 0.9% bolus 250 mL 250 mL PRN    sodium chloride 0.9% bolus 250 mL 250 mL PRN    sodium chloride 0.9% flush 10 mL PRN       Objective:     Vital Signs (Most Recent):  Temp: 98.6 °F (37 °C) (02/09/24 0800)  Pulse: 87 (02/09/24 1133)  Resp: 15 (02/09/24 1133)  BP: (!) 147/66 (02/09/24 1133)  SpO2: 100 % (02/09/24 1133) Vital Signs (24h Range):  Temp:  [98.1 °F (36.7 °C)-98.8 °F (37.1 °C)] 98.6 °F (37 °C)  Pulse:  [84-96] 87  Resp:  [14-23] 15  SpO2:  [98 %-100 %] 100 %  BP: (147-187)/(64-79) 147/66  Arterial Line BP: (128-174)/(55-74) 131/63     Weight: (!) 153.3 kg (338 lb) (02/08/24 0300)  Body mass index is 56.25 kg/m².  Body surface area is 2.65 meters squared.    I/O last 3 completed shifts:  In: 2861.3 [I.V.:937.6; Other:1000; NG/GT:330; IV Piggyback:593.7]  Out: 4245 [Urine:845; Other:3400]     Physical Exam  Vitals and nursing note reviewed.   Constitutional:       General: She is not in acute distress.     Appearance: She is obese. She is not ill-appearing or toxic-appearing.      Interventions: She is sedated and intubated.   Eyes:      General: No scleral icterus.        Right eye: No discharge.         Left eye: No discharge.   Cardiovascular:      Rate and Rhythm: Normal rate.   Pulmonary:      Effort: No respiratory distress. She is intubated.      Comments:       Abdominal:      General: There is distension.   Musculoskeletal:      Right lower leg: Edema present.      Left lower leg: Edema present.          Significant Labs:  CBC:   Recent Labs   Lab 02/09/24  0335   WBC 16.02*  16.02*   RBC 2.68*  2.68*   HGB 7.1*  7.1*   HCT 22.7*  22.7*     387   MCV 85  85   MCH 26.5*  26.5*   MCHC 31.3*  31.3*     CMP:   Recent Labs   Lab 02/09/24  0335      CALCIUM 8.0*   ALBUMIN 1.8*   PROT 7.1   *   K 4.1   CO2 16*      BUN 59*   CREATININE 4.4*   ALKPHOS 129   ALT 10   AST 31   BILITOT 0.2     All labs within the past 24 hours have been reviewed.     Assessment/Plan:      Renal/  * Ayaz's gangrene  -management per primary     ALVARADO (acute kidney injury)  Transfer from [facility]. Admitted for fourniers gangrene. Initiated HD on 1/31. ALVARADO 2/2 ATN in setting of septic shock. Arrived with CR 3.8. Unknown baseline.  Plan to OR today. Net -1.3L.OR today for debridement with possible closure and wound vac placement     ALVARADO most likley ATN in setting of septic shock     Plan/Recommendation  -Will hold further HD at this time. 24 hr  mL. Will plan for diuretic challenge today and reassess the need for HD tomorrow. Will monitor for sings of renal recovery.   -recommend 120 mg IV x 1 today   -Daily RFP   -Strict I&Os  -Avoid nephrotoxins, if possible         Thank you for your consult. I will follow-up with patient. Please contact us if you have any additional questions.    Shwetha Gregory DNP, FNP-C  Nephrology  [facility] - [facility]

## 2024-02-09 NOTE — PLAN OF CARE
SICU PLAN OF CARE NOTE    Dx: Ayaz's gangrene    Goals of Care: SBP < 170. OR tomorrow. JAY plan pending. NPO at midnight. Planned HD tonight.     Vital Signs (last 12 hours):   Temp:  [98.1 °F (36.7 °C)-98.2 °F (36.8 °C)]   Pulse:  [84-92]   Resp:  [14-18]   BP: (146-178)/(65-81)   SpO2:  [98 %-100 %]   Arterial Line BP: (130-173)/(58-71)      Neuro: Arouses to Voice    Cardiac: NSR    Respiratory: Ventilator    Gtts: Insulin and MIVF    Urine Output: Urinary Catheter 350 cc/shift    Dialysis: HD, planned tonight    Drains: OGT, Rectal tube. Wound vac.     Diet: Tube Feeds, Hold at midnight     Labs/Accuchecks: Accuchecks q4    Skin:  All skin remains free from injury.  Patient turned Q2, Groin wound vacs. Sacral intact. mattress inflated, and bed working correctly.    Shift Events:  Pt scheduled for OR tomorrow.  See flowsheet for further assessment/details.  Family updated on current condition/plan of care, questions answered, and emotional support provided.  MD updated on current condition, vitals, labs, and gtts.  No new orders received, will continue to monitor.

## 2024-02-09 NOTE — PLAN OF CARE
"      SICU PLAN OF CARE NOTE    Dx: Ayaz's gangrene    Shift Events: No acute event overnight, Hydrazine given once.    Goals of Care: To OR for I&D this AM.     Neuro: Open eyes to pain stimulation    Vital Signs: BP (!) 148/64   Pulse 92   Temp 98.6 °F (37 °C) (Oral)   Resp 17   Ht 5' 5" (1.651 m)   Wt (!) 153.3 kg (338 lb)   LMP 01/01/2024 (Approximate) Comment: tubal ligation  SpO2 100%   BMI 56.25 kg/m²     Cardiac: SR, SBP < 170    Respiratory: Ventilator    Diet: NPO    Gtts: Insulin    Urine Output: Urinary Catheter 300 cc/shift    Drains: Wound vac with 100cc output.     Labs/Accuchecks: q4h.    Skin: No new skin issue, turned q2h.     "

## 2024-02-09 NOTE — ASSESSMENT & PLAN NOTE
Lab Results   Component Value Date    CREATININE 4.4 (H) 02/09/2024     Avoid insulin stacking  Titrate insulin slowly

## 2024-02-09 NOTE — SUBJECTIVE & OBJECTIVE
Interval History/Significant Events: NPO midnight. Currently receiving HD with goal of removing 2L. Given hydralazine once. NAEON. Surgery consented for debridement in OR this AM.     Follow-up For: Procedure(s) (LRB):  DEBRIDEMENT, WOUND, replace wound vac, possible closure (Right)    Post-Operative Day: 3 Days Post-Op    Objective:     Vital Signs (Most Recent):  Temp: 98.6 °F (37 °C) (02/09/24 0700)  Pulse: 94 (02/09/24 0700)  Resp: 17 (02/09/24 0700)  BP: (!) 149/67 (02/09/24 0700)  SpO2: 100 % (02/09/24 0700) Vital Signs (24h Range):  Temp:  [98.1 °F (36.7 °C)-98.8 °F (37.1 °C)] 98.6 °F (37 °C)  Pulse:  [84-96] 94  Resp:  [14-23] 17  SpO2:  [98 %-100 %] 100 %  BP: (146-187)/(64-81) 149/67  Arterial Line BP: (128-174)/(55-74) 140/66     Weight: (!) 153.3 kg (338 lb)  Body mass index is 56.25 kg/m².      Intake/Output Summary (Last 24 hours) at 2/9/2024 0704  Last data filed at 2/9/2024 0700  Gross per 24 hour   Intake 2341.56 ml   Output 3960 ml   Net -1618.44 ml          Physical Exam  Vitals reviewed.   Constitutional:       Appearance: She is obese.   HENT:      Head: Normocephalic.      Mouth/Throat:      Mouth: Mucous membranes are moist.   Eyes:      Pupils: Pupils are equal, round, and reactive to light.      Comments: Bilateral R gaze   Neck:      Comments: RIJ Trialysis  Cardiovascular:      Rate and Rhythm: Normal rate and regular rhythm.   Pulmonary:      Effort: Pulmonary effort is normal.      Comments: Intubated  Abdominal:      General: Abdomen is flat.      Palpations: Abdomen is soft.   Genitourinary:     Comments: R groin wound   Glynn in place  Rectal tube  Musculoskeletal:      Right lower leg: Edema present.      Left lower leg: Edema present.      Comments: R radial A-line   Skin:     General: Skin is warm.   Neurological:      Comments: Opens eyes to pain but does not withdraw   Psychiatric:         Mood and Affect: Mood normal.            Vents:  Vent Mode: Spont (02/09/24 1780)  Set  Rate: 18 BPM (02/06/24 0349)  Vt Set: 420 mL (02/06/24 0349)  Pressure Support: 10 cmH20 (02/09/24 0340)  PEEP/CPAP: 5 cmH20 (02/09/24 0340)  Oxygen Concentration (%): 40 (02/09/24 0700)  Peak Airway Pressure: 16 cmH20 (02/09/24 0340)  Plateau Pressure: 15 cmH20 (02/09/24 0340)  Total Ve: 9.71 L/m (02/09/24 0340)  Negative Inspiratory Force (cm H2O): 0 (02/08/24 1506)  F/VT Ratio<105 (RSBI): (!) 53 (02/09/24 0340)    Lines/Drains/Airways       Central Venous Catheter Line  Duration             Trialysis (Dialysis) Catheter 02/06/24 1345 right internal jugular 2 days              Drain  Duration                  Rectal Tube 02/04/24 1545 4 days         NG/OG Tube 02/06/24 1030 Center mouth 2 days         Urethral Catheter 02/06/24 1900 2 days              Airway  Duration                  Airway - Non-Surgical 01/31/24 1020 8 days              Arterial Line  Duration             Arterial Line 01/31/24 1025 Right Radial 8 days              Peripheral Intravenous Line  Duration                  Peripheral IV - Single Lumen 02/04/24 2241 20 G Right Antecubital 4 days         Peripheral IV - Single Lumen 02/08/24 0152 18 G Anterior;Left Forearm 1 day         Peripheral IV - Single Lumen 02/09/24 0130 18 G Anterior;Right Forearm <1 day                    Significant Labs:    CBC/Anemia Profile:  Recent Labs   Lab 02/08/24 0302 02/09/24  0335   WBC 20.99* 16.02*  16.02*   HGB 7.3* 7.1*  7.1*   HCT 22.8* 22.7*  22.7*    387  387   MCV 84 85  85   RDW 17.2* 17.6*  17.6*        Chemistries:  Recent Labs   Lab 02/08/24 0302 02/09/24  0335   * 134*   K 4.4 4.1    106   CO2 16* 16*   BUN 49* 59*   CREATININE 4.7* 4.4*   CALCIUM 8.2* 8.0*   ALBUMIN 1.7* 1.8*   PROT 7.0 7.1   BILITOT 0.2 0.2   ALKPHOS 160* 129   ALT 11 10   AST 33 31   MG 2.3 2.1   PHOS 7.9* 8.6*           Significant Imaging:  N/A

## 2024-02-09 NOTE — ASSESSMENT & PLAN NOTE
53 y.o. 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     - Last WV change 2/6. To OR today for Vac change, consent obtained from the daughter  - Palliative following given overall poor prognosis, daughter would like everything done   - Daily SBTs  - Okay for DVT ppx   - Remainder of care per SICU

## 2024-02-09 NOTE — PROGRESS NOTES
02/09/24 0700   Vital Signs   Temp 98.6 °F (37 °C)   Temp Source Oral   Pulse 94   Heart Rate Source Monitor   Resp 17   SpO2 100 %   Pulse Oximetry Type Continuous   Oxygen Concentration (%) 40   Device (Oxygen Therapy) ventilator   BP (!) 149/67   MAP (mmHg) 96     HD tx completed and pt tolerated well.  NetUF: 2L per order.   Tx time: 3hrs.  Catheter is not so good.  No distress noted,VSS.

## 2024-02-09 NOTE — BRIEF OP NOTE
[facility] - Surgical Intensive Care  Brief Operative Note    SUMMARY     Surgery Date: 2/9/2024     Surgeon(s) and Role:     * Steve Cole MD - Primary     * Marisa Fung MD - Resident - Chief     * Jean Claude Lara MD - Resident - Assisting        Pre-op Diagnosis:  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]    Post-op Diagnosis:  Post-Op Diagnosis Codes:     * 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]    Procedure(s) (LRB):  DEBRIDEMENT, WOUND w wound vac change (Right)    Anesthesia: General    Operative Findings: Wound vac exchanged. Some fibrinous exudate debrided. 12 cm of the inferior incision closed.    Estimated Blood Loss: <2 cc

## 2024-02-09 NOTE — TRANSFER OF CARE
"Anesthesia Transfer of Care Note    Patient: Sarah Saravia    Procedure(s) Performed: Procedure(s) (LRB):  DEBRIDEMENT, WOUND w wound vac change (Right)    Patient location: ICU    Anesthesia Type: general    Transport from OR: Transported from OR intubated on 100% O2 by AMBU with assisted ventilation. Continuous ECG monitoring in transport. Continuous SpO2 monitoring in transport. Continuos invasive BP monitoring in transport. Upon arrival to PACU/ICU, patient attached to ventilator and auscultated to confirm bilateral breath sounds and adequate TV    Post pain: adequate analgesia    Post assessment: no apparent anesthetic complications and tolerated procedure well    Post vital signs: stable    Level of consciousness: obtunded    Nausea/Vomiting: no nausea/vomiting    Complications: none    Transfer of care protocol was followed      Last vitals: Visit Vitals  /56   Pulse 86   Temp 37 °C (98.6 °F) (Oral)   Resp 16   Ht 5' 5" (1.651 m)   Wt (!) 153.3 kg (338 lb)   LMP 01/01/2024 (Approximate)   SpO2 97%   BMI 56.25 kg/m²     "

## 2024-02-09 NOTE — SUBJECTIVE & OBJECTIVE
Interval History:   Plans for debridement in the OR today.   HD completed this AM. Net UF 2 L.   Net negative 1.6 L/24 hrs.     Review of patient's allergies indicates:  No Known Allergies  Current Facility-Administered Medications   Medication Frequency    0.9%  NaCl infusion PRN    0.9%  NaCl infusion Once    acetaminophen tablet 650 mg Q6H    albuterol-ipratropium 2.5 mg-0.5 mg/3 mL nebulizer solution 3 mL Q4H PRN    amLODIPine tablet 10 mg Daily    carvediloL tablet 12.5 mg BID    cefTRIAXone (ROCEPHIN) 2 g in dextrose 5 % in water (D5W) 100 mL IVPB (MB+) Q24H    dextrose 10 % infusion Continuous PRN    dextrose 10% bolus 125 mL 125 mL PRN    dextrose 10% bolus 250 mL 250 mL PRN    dextrose 5 % and 0.45 % NaCl infusion Continuous    famotidine tablet 20 mg Daily    glucagon (human recombinant) injection 1 mg PRN    glucose chewable tablet 16 g PRN    glucose chewable tablet 24 g PRN    heparin (porcine) injection 7,500 Units Q8H    hydrALAZINE injection 10 mg Q4H PRN    HYDROmorphone injection 0.5 mg Q4H PRN    insulin aspart U-100 pen 0-10 Units Q4H PRN    insulin aspart U-100 pen 6 Units Q4H    insulin regular in 0.9 % NaCl 100 unit/100 mL (1 unit/mL) infusion Continuous    labetalol 20 mg/4 mL (5 mg/mL) IV syring Q6H PRN    metronidazole IVPB 500 mg Q8H    naloxone 0.4 mg/mL injection 0.02 mg PRN    ondansetron injection 4 mg Q6H PRN    oxyCODONE 5 mg/5 mL solution 10 mg Q6H PRN    oxyCODONE 5 mg/5 mL solution 5 mg Q6H PRN    prochlorperazine injection Soln 5 mg Q6H PRN    psyllium husk (aspartame) 3.4 gram packet 1 packet BID    sevelamer carbonate pwpk 1.6 g TID WM    sodium chloride 0.9% bolus 250 mL 250 mL PRN    sodium chloride 0.9% bolus 250 mL 250 mL PRN    sodium chloride 0.9% flush 10 mL PRN       Objective:     Vital Signs (Most Recent):  Temp: 98.6 °F (37 °C) (02/09/24 0800)  Pulse: 87 (02/09/24 1133)  Resp: 15 (02/09/24 1133)  BP: (!) 147/66 (02/09/24 1133)  SpO2: 100 % (02/09/24 1133) Vital Signs  (24h Range):  Temp:  [98.1 °F (36.7 °C)-98.8 °F (37.1 °C)] 98.6 °F (37 °C)  Pulse:  [84-96] 87  Resp:  [14-23] 15  SpO2:  [98 %-100 %] 100 %  BP: (147-187)/(64-79) 147/66  Arterial Line BP: (128-174)/(55-74) 131/63     Weight: (!) 153.3 kg (338 lb) (02/08/24 0300)  Body mass index is 56.25 kg/m².  Body surface area is 2.65 meters squared.    I/O last 3 completed shifts:  In: 2861.3 [I.V.:937.6; Other:1000; NG/GT:330; IV Piggyback:593.7]  Out: 4245 [Urine:845; Other:3400]     Physical Exam  Vitals and nursing note reviewed.   Constitutional:       General: She is not in acute distress.     Appearance: She is obese. She is not ill-appearing or toxic-appearing.      Interventions: She is sedated and intubated.   Eyes:      General: No scleral icterus.        Right eye: No discharge.         Left eye: No discharge.   Cardiovascular:      Rate and Rhythm: Normal rate.   Pulmonary:      Effort: No respiratory distress. She is intubated.      Comments:       Abdominal:      General: There is distension.   Musculoskeletal:      Right lower leg: Edema present.      Left lower leg: Edema present.          Significant Labs:  CBC:   Recent Labs   Lab 02/09/24  0335   WBC 16.02*  16.02*   RBC 2.68*  2.68*   HGB 7.1*  7.1*   HCT 22.7*  22.7*     387   MCV 85  85   MCH 26.5*  26.5*   MCHC 31.3*  31.3*     CMP:   Recent Labs   Lab 02/09/24  0335      CALCIUM 8.0*   ALBUMIN 1.8*   PROT 7.1   *   K 4.1   CO2 16*      BUN 59*   CREATININE 4.4*   ALKPHOS 129   ALT 10   AST 31   BILITOT 0.2     All labs within the past 24 hours have been reviewed.

## 2024-02-09 NOTE — SUBJECTIVE & OBJECTIVE
"Interval HPI:   No acute events overnight. Patient in room 70870/03833 A. Blood glucose stable. BG at goal on current insulin regimen (Transition Insulin Drip). Steroid use- None .   2 Days Post-Op  Renal function- Abnormal - Cr 4.4   Vasopressors-  None      Diet NPO      Eating:   NPO  Nausea: No  Hypoglycemia and intervention: No  Fever: No  TPN and/or TF: Yes  If yes, type of TF/TPN and rate: 10cc/htr with plans to advance to 30 cc/hr    BP (!) 157/66   Pulse 91   Temp 98.6 °F (37 °C) (Oral)   Resp 16   Ht 5' 5" (1.651 m)   Wt (!) 153.3 kg (338 lb)   LMP 01/01/2024 (Approximate) Comment: tubal ligation  SpO2 100%   BMI 56.25 kg/m²     Labs Reviewed and Include    Recent Labs   Lab 02/09/24  0335      CALCIUM 8.0*   ALBUMIN 1.8*   PROT 7.1   *   K 4.1   CO2 16*      BUN 59*   CREATININE 4.4*   ALKPHOS 129   ALT 10   AST 31   BILITOT 0.2     Lab Results   Component Value Date    WBC 16.02 (H) 02/09/2024    WBC 16.02 (H) 02/09/2024    HGB 7.1 (L) 02/09/2024    HGB 7.1 (L) 02/09/2024    HCT 22.7 (L) 02/09/2024    HCT 22.7 (L) 02/09/2024    MCV 85 02/09/2024    MCV 85 02/09/2024     02/09/2024     02/09/2024     No results for input(s): "TSH", "FREET4" in the last 168 hours.  Lab Results   Component Value Date    HGBA1C 10.4 (H) 01/30/2024       Nutritional status:   Body mass index is 56.25 kg/m².  Lab Results   Component Value Date    ALBUMIN 1.8 (L) 02/09/2024    ALBUMIN 1.7 (L) 02/08/2024    ALBUMIN 1.8 (L) 02/07/2024    ALBUMIN 1.8 (L) 02/07/2024     No results found for: "PREALBUMIN"    Estimated Creatinine Clearance: 22.3 mL/min (A) (based on SCr of 4.4 mg/dL (H)).    Accu-Checks  Recent Labs     02/07/24  1927 02/08/24  0011 02/08/24  0302 02/08/24  0808 02/08/24  1203 02/08/24  1637 02/08/24  1952 02/08/24  2320 02/09/24  0334 02/09/24  0837   POCTGLUCOSE 184* 216* 169* 149* 182* 184* 191* 140* 113* 143*       Current Medications and/or Treatments Impacting Glycemic " Control  Immunotherapy:    Immunosuppressants       None          Steroids:   Hormones (From admission, onward)      None          Pressors:    Autonomic Drugs (From admission, onward)      None          Hyperglycemia/Diabetes Medications:   Antihyperglycemics (From admission, onward)      Start     Stop Route Frequency Ordered    02/09/24 1200  insulin aspart U-100 pen 6 Units         -- SubQ Every 4 hours 02/09/24 0901 02/09/24 0915  insulin regular in 0.9 % NaCl 100 unit/100 mL (1 unit/mL) infusion        Question:  Enter initial dose (Units/hr):  Answer:  0.7    -- IV Continuous 02/09/24 0901    02/03/24 1010  insulin aspart U-100 pen 0-10 Units         -- SubQ Every 4 hours PRN 02/03/24 0911

## 2024-02-09 NOTE — SUBJECTIVE & OBJECTIVE
Interval History: No significant clinical changes. Ongoing workup for etiology of strokes. WBC continues to downtrend, now 16 from 21. AF, HDS    Medications:  Continuous Infusions:   dextrose 10 % in water (D10W)      dextrose 5 % and 0.45 % NaCl 50 mL/hr at 02/09/24 0600    insulin regular 1 units/mL infusion orderable (TRANSFER) 0.6 Units/hr (02/09/24 0600)     Scheduled Meds:   sodium chloride 0.9%   Intravenous Once    acetaminophen  650 mg Per OG tube Q6H    amLODIPine  10 mg Per OG tube Daily    carvediloL  6.25 mg Per OG tube BID    cefTRIAXone (Rocephin) IV (PEDS and ADULTS)  2 g Intravenous Q24H    famotidine  20 mg Per OG tube Daily    heparin (porcine)  7,500 Units Subcutaneous Q8H    insulin aspart U-100  10 Units Subcutaneous Q4H    metronidazole  500 mg Intravenous Q8H    psyllium husk (aspartame)  1 packet Per OG tube BID    sevelamer carbonate  1.6 g Oral TID WM     PRN Meds:sodium chloride 0.9%, albuterol-ipratropium, dextrose 10 % in water (D10W), dextrose 10%, dextrose 10%, glucagon (human recombinant), glucose, glucose, hydrALAZINE, HYDROmorphone, insulin aspart U-100, labetalol, naloxone, ondansetron, oxyCODONE, oxyCODONE, prochlorperazine, sodium chloride 0.9%, sodium chloride 0.9%, sodium chloride 0.9%     Review of patient's allergies indicates:  No Known Allergies  Objective:     Vital Signs (Most Recent):  Temp: 98.6 °F (37 °C) (02/09/24 0700)  Pulse: 94 (02/09/24 0700)  Resp: 17 (02/09/24 0700)  BP: (!) 149/67 (02/09/24 0700)  SpO2: 100 % (02/09/24 0700) Vital Signs (24h Range):  Temp:  [98.1 °F (36.7 °C)-98.8 °F (37.1 °C)] 98.6 °F (37 °C)  Pulse:  [84-96] 94  Resp:  [14-23] 17  SpO2:  [98 %-100 %] 100 %  BP: (146-187)/(64-81) 149/67  Arterial Line BP: (128-174)/(55-74) 140/66     Weight: (!) 153.3 kg (338 lb)  Body mass index is 56.25 kg/m².    Intake/Output - Last 3 Shifts         02/07 0700  02/08 0659 02/08 0700  02/09 0659 02/09 0700  02/10 0659    I.V. (mL/kg) 113.7 (0.7) 885.2  (5.8)     Other   1000    NG/ 60     IV Piggyback 480.5 396.3     Total Intake(mL/kg) 1219.2 (8) 1341.6 (8.8) 1000 (6.5)    Urine (mL/kg/hr) 245 (0.1) 660 (0.2)     Other     Stool 0      Total Output     Net +774.2 +381.6 -2000           Stool Occurrence 1 x               Physical Exam  Vitals reviewed.   Constitutional:       Appearance: She is obese.   HENT:      Head: Normocephalic.      Mouth/Throat:      Mouth: Mucous membranes are moist.   Eyes:      Pupils: Pupils are equal, round, and reactive to light.      Comments: Bilateral R gaze   Neck:      Comments: RIJ Trialysis  Cardiovascular:      Rate and Rhythm: Normal rate and regular rhythm.   Pulmonary:      Effort: Pulmonary effort is normal.      Comments: Intubated  Abdominal:      General: Abdomen is flat.      Palpations: Abdomen is soft.   Genitourinary:     Comments: Vac in place with good seal   Glynn in place  Rectal tube  Musculoskeletal:      Right lower leg: Edema present.      Left lower leg: Edema present.      Comments: R radial A-line   Skin:     General: Skin is warm.   Psychiatric:         Mood and Affect: Mood normal.          Significant Labs:  I have reviewed all pertinent lab results within the past 24 hours.  CBC:   Recent Labs   Lab 02/09/24  0335   WBC 16.02*  16.02*   RBC 2.68*  2.68*   HGB 7.1*  7.1*   HCT 22.7*  22.7*     387   MCV 85  85   MCH 26.5*  26.5*   MCHC 31.3*  31.3*     CMP:   Recent Labs   Lab 02/09/24  0335      CALCIUM 8.0*   ALBUMIN 1.8*   PROT 7.1   *   K 4.1   CO2 16*      BUN 59*   CREATININE 4.4*   ALKPHOS 129   ALT 10   AST 31   BILITOT 0.2       Significant Diagnostics:  I have reviewed all pertinent imaging results/findings within the past 24 hours.

## 2024-02-09 NOTE — PROGRESS NOTES
[facility] - Surgical Intensive Care  Critical Care - Surgery  Progress Note    Patient Name: Sarah Saravia  MRN: [id number]  Admission Date: 1/29/2024  Hospital Length of Stay: 11 days  Code Status: Full Code  Attending Provider: Steve Cole MD  [facility]   Principal Problem: Ayaz's gangrene    Subjective:   Interval History/Significant Events: NPO midnight. Currently receiving HD with goal of removing 2L. Given hydralazine once. NAEON. Surgery consented for debridement in OR this AM.     Follow-up For: Procedure(s) (LRB):  DEBRIDEMENT, WOUND, replace wound vac, possible closure (Right)    Post-Operative Day: 3 Days Post-Op    Objective:     Vital Signs (Most Recent):  Temp: 98.6 °F (37 °C) (02/09/24 0700)  Pulse: 94 (02/09/24 0700)  Resp: 17 (02/09/24 0700)  BP: (!) 149/67 (02/09/24 0700)  SpO2: 100 % (02/09/24 0700) Vital Signs (24h Range):  Temp:  [98.1 °F (36.7 °C)-98.8 °F (37.1 °C)] 98.6 °F (37 °C)  Pulse:  [84-96] 94  Resp:  [14-23] 17  SpO2:  [98 %-100 %] 100 %  BP: (146-187)/(64-81) 149/67  Arterial Line BP: (128-174)/(55-74) 140/66     Weight: (!) 153.3 kg (338 lb)  Body mass index is 56.25 kg/m².      Intake/Output Summary (Last 24 hours) at 2/9/2024 0704  Last data filed at 2/9/2024 0700  Gross per 24 hour   Intake 2341.56 ml   Output 3960 ml   Net -1618.44 ml          Physical Exam  Vitals reviewed.   Constitutional:       Appearance: She is obese.   HENT:      Head: Normocephalic.      Mouth/Throat:      Mouth: Mucous membranes are moist.   Eyes:      Pupils: Pupils are equal, round, and reactive to light.      Comments: Bilateral R gaze   Neck:      Comments: RIJ Trialysis  Cardiovascular:      Rate and Rhythm: Normal rate and regular rhythm.   Pulmonary:      Effort: Pulmonary effort is normal.      Comments: Intubated  Abdominal:      General: Abdomen is flat.      Palpations: Abdomen is soft.   Genitourinary:     Comments: R groin wound    Glynn in place  Rectal tube  Musculoskeletal:      Right lower leg: Edema present.      Left lower leg: Edema present.      Comments: R radial A-line   Skin:     General: Skin is warm.   Neurological:      Comments: Opens eyes to pain but does not withdraw   Psychiatric:         Mood and Affect: Mood normal.            Vents:  Vent Mode: Spont (02/09/24 0340)  Set Rate: 18 BPM (02/06/24 0349)  Vt Set: 420 mL (02/06/24 0349)  Pressure Support: 10 cmH20 (02/09/24 0340)  PEEP/CPAP: 5 cmH20 (02/09/24 0340)  Oxygen Concentration (%): 40 (02/09/24 0700)  Peak Airway Pressure: 16 cmH20 (02/09/24 0340)  Plateau Pressure: 15 cmH20 (02/09/24 0340)  Total Ve: 9.71 L/m (02/09/24 0340)  Negative Inspiratory Force (cm H2O): 0 (02/08/24 1506)  F/VT Ratio<105 (RSBI): (!) 53 (02/09/24 0340)    Lines/Drains/Airways       Central Venous Catheter Line  Duration             Trialysis (Dialysis) Catheter 02/06/24 1345 right internal jugular 2 days              Drain  Duration                  Rectal Tube 02/04/24 1545 4 days         NG/OG Tube 02/06/24 1030 Center mouth 2 days         Urethral Catheter 02/06/24 1900 2 days              Airway  Duration                  Airway - Non-Surgical 01/31/24 1020 8 days              Arterial Line  Duration             Arterial Line 01/31/24 1025 Right Radial 8 days              Peripheral Intravenous Line  Duration                  Peripheral IV - Single Lumen 02/04/24 2241 20 G Right Antecubital 4 days         Peripheral IV - Single Lumen 02/08/24 0152 18 G Anterior;Left Forearm 1 day         Peripheral IV - Single Lumen 02/09/24 0130 18 G Anterior;Right Forearm <1 day                    Significant Labs:    CBC/Anemia Profile:  Recent Labs   Lab 02/08/24  0302 02/09/24  0335   WBC 20.99* 16.02*  16.02*   HGB 7.3* 7.1*  7.1*   HCT 22.8* 22.7*  22.7*    387  387   MCV 84 85  85   RDW 17.2* 17.6*  17.6*        Chemistries:  Recent Labs   Lab 02/08/24  0302 02/09/24  0335   *  134*   K 4.4 4.1    106   CO2 16* 16*   BUN 49* 59*   CREATININE 4.7* 4.4*   CALCIUM 8.2* 8.0*   ALBUMIN 1.7* 1.8*   PROT 7.0 7.1   BILITOT 0.2 0.2   ALKPHOS 160* 129   ALT 11 10   AST 33 31   MG 2.3 2.1   PHOS 7.9* 8.6*           Significant Imaging:  N/A  Assessment/Plan:     Renal/  * Ayaz's gangrene    Neuro/Psych:   #Acute Encephalopathy  CTH 2/3 with scattered hypoattenuation likely representing age indeterminate infarcts. EEG findings consistent with moderate-severe encephalopathy. MRI 2/6: Several scattered punctate acute infarcts throughout the bilateral frontal lobes, centrum semiovale, basal ganglia, corpus callosum, and cerebellar hemispheres.  CTA 2/6: Atherosclerotic plaquing of the carotid bifurcations and proximal ICAs with less than 50% proximal ICA stenosis by NASCET criteria . 2/7 CTH unchanged from prior CT. Ddx includes septic embolic vs inflammatory vs hypercoagulopathy.   -- Sedation: None  -- Pain: kermit tylenol, dialudid and oxy prn  -- GCS 7T: E2, V1T, M4; exam stable  -- Neurology following; appreciate recs  -- Neurovascular following; appreciate recs  -- Palliative following; GOC conversation 2/7; appreciate recs  -- Cardiology currently not recommending JAY as team does not feel it will change prognosis. Will continue to obtain JAY with cardiac anesthesia             Cards:   TTE 2/6 unremarkable.  -- HDS  -- goal SBP < 180, MAP >65  -- hypertensive, hydralazine and labetalol prns  -- Continue amlodipine 10mg daily; increase coreg 12.5mg BID      Pulm:   #Acute Respiratory Failure  -- Intubated on spontaneous , Pressure support.  -- Goal O2 sat > 90%      Renal:  #ALVARADO on CKD  #ATN  Received HD 2/6 without issue. HD 2/9 with goal of removing 2L.   -- Nephrology following; appreciate recs  -- RRT as needed  -- Transition to Lasix 120mg challenge after HD  Recent Labs   Lab 02/07/24  0308 02/08/24  0302 02/09/24  0335   BUN 31*  31* 49* 59*   CREATININE 3.4*  3.4* 4.7* 4.4*         FEN / GI:   -- Daily CMPs  -- NGT in place   -- Replace lytes as needed  -- Nutrition: NPO midnight. Will restart TF (Novasource Renal) at goal 30 ml/hr after OR   -- GI PPX   -- Nutrition following; appreciate recs  -- Continue psyllium      ID:    #Ayaz's gangrene  s/p OR debridement for nec fascitis. R groin tissue with proteus, sensitive to ceftriaxone. Growing bacteroids as well. Debridement and wound vac change 2/7  -- Abx: ceftriaxone and flagyl  -- ID following ; appreciate recs  -- OR today for further debridement   Recent Labs   Lab 02/07/24 0308 02/08/24 0302 02/09/24  0335   WBC 31.47* 20.99* 16.02*  16.02*        Heme/Onc:  -- Daily CBC  -- Heparin DVT ppx  -- If Hgb <7, transfuse 1unit pRBC. Consent in.  Recent Labs   Lab 02/07/24 0308 02/08/24 0302 02/09/24  0335   HGB 8.4* 7.3* 7.1*  7.1*    342 387  387        Endo:   #IDDM  Initially presented to OSH with DKA with latest A1C 10.4 AG Gap resolved  - Placed on insulin gtt 2/3: Transition IV insulin infusion at 1.8 u/hr with stepdown parameters   - Tfs held; SSI HELD as tube feeds are stopped or BG < 100  - q4h BG monitoring while NPO  - Resume Novolog to 10 units units q 4 hrs when tube feeds restarted   - Moderate dose SSI PRN  - Endocrine following, appreciate recs        PPx:   Feeding: NPO  Analgesia/Sedation: See above  Thromboembolic prevention: SQH   HOB >30: Y  Stress Ulcer ppx: Famotidine  Glucose control: Critical care goal 140-180 g/dl, Insulin gtt, kermit novolog, SSI, Endo following  Bowel reg: psyllium  Invasive Lines/Drains/Airway: Glynn, ETT, Art line, Trialysis, PIV x2, Rectal tube      Dispo/Code Status/Palliative:   -- SICU / Full Code   -- OR this AM for further debridement  -- JAY for neuroprognosis/stroke      David Stokes MD  Critical Care - Surgery  [facility] - Surgical Intensive Care

## 2024-02-09 NOTE — ASSESSMENT & PLAN NOTE
BG goal 140-180  No previous hx of T2DM per family at bedside. A1c of 10.4. DKA at OSH. TF on and off pending NPO status.  Will decrease scheduled amount as restarted on trickle.    Plan:  -Adjust Transition IV insulin infusion at 0.7 u/hr with stepdown parameters   -Decrease Novolog 6 units q 4 hrs while on tube feeding (HOLD if tube feeding is stopped or BG < 100)   -Continue Moderate Dose Correction Scale  -BG monitoring q 4 hrs while NPO     ** Please call Endocrine for any BG related issues **      Discharge plans: TBD    Lab Results   Component Value Date    HGBA1C 10.4 (H) 01/30/2024

## 2024-02-09 NOTE — PROGRESS NOTES
[facility]  Infectious Disease  Progress Note    Patient Name: Sarah Saravia  MRN: [id number]  Admission Date: 1/29/2024  Length of Stay: 11 days  Attending Physician: Steve Cole MD  Primary Care Provider: [facility]    Isolation Status: No active isolations  Assessment/Plan:      Neuro  Ac isch multi vasc territories stroke  Multiple acute embolic strokes noted on MRI. TTE without evidence of infective endocarditis. Blood cultures no growth.  Low suspicion for infective endocarditis however agree with work up detailed by vascular neurology.   Will follow up JAY results.    Renal/  * Ayaz's gangrene  I have reviewed hospital notes from  SICU service and other specialty providers. I have also reviewed CBC, CMP/BMP,  cultures and imaging with my interpretation as documented.     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 Operative cultures showing P mirabilis. S/P wound VAC exchange on 2/6. Afebrile and with down trend in leukocytosis.  Continue ceftriaxone 2 gm IV daily.  Continue metronidazole.   Will follow up culture results.  Surgical debridements as indicated for non viable tissue as per Surgical Service.  Discussed management plan with the staff and/or members from SICU and General Surgery service.           Anticipated Disposition: per perimary    Thank you for your consult. I will follow-up with patient. Please contact us if you have any additional questions.    Devika Andujar MD  Infectious Disease  [facility]    51 minutes of total time spent on the encounter, which includes face to face time and non-face to face time preparing to see the patient (eg, review of tests), obtaining and/or reviewing separately obtained history, documenting clinical information in the electronic or other health record, independently interpreting results (not separately reported) and communicating results to the  patient/family/caregiver, or care coordination (not separately reported).     Subjective:     Principal Problem:Ayaz's gangrene    HPI: A 53-year-old woman with morbid obesity as evidenced by a BMI of 50 3 (documented as 58 at [facility]) who originally presented to [facility] with a wound to her posterior thigh, vomiting, and diarrhea for 3-4 days prior to admission.  On evaluation in [facility] ED she was noted to be afebrile tachycardic, and hypertensive.  Laboratory workup at that time revealed leukocytosis, elevated creatinine, elevated lactic acid, and elevated CRP.  Additionally it showed hyperglycemia with anion gap acidosis consistent with diabetic ketoacidosis.  CT imaging of the abdomen showed extensive soft tissue air throughout the right groin and inguinal region extending to the right lower quadrant of the anterior abdominal wall and medial aspect of the right thigh concerning for gas-forming infection.  She was admitted to hospital medicine service and taken to the OR by General surgery for debridement.  Empiric antibiotics with Zosyn, clindamycin, and vancomycin was started.  She underwent incision and drainage with debridement of the soft tissues down to the muscular fascia on 01/29.  She was then subsequently taken back to the OR on 01/31 where she underwent irrigation and debridement of the wound and insertion of a triple-lumen temporary hemodialysis catheter.  She was subsequently transitioned to meropenem and clindamycin while in [facility].  Unfortunately her hospital course was complicated by acute respiratory failure requiring intubation with mechanical ventilation and worsening ALVARADO prompting initiation of renal replacement therapy.  She was transferred to [facility] for higher level of care.  Upon transfer the patient's antibiotic therapy was deescalated to ceftriaxone.    Infectious disease is consulted for Antibiotic management: currently on merem, concern for nec  fasciitis          Interval History: ".  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 Taken to OR on 2/2 for debridement of the right buttocks and groin (#3). Evaluated by Neurology on 2/3 for encephalopathy. CT head with remote infarcts. Not withdrawing to pain on the morning of 2/5.    MRI done on 2/6 with embolic infarcts. S/P wound VAC exchange on 2/6. MRI brain revealed multiple embolic infarcts. Pending JAY today during wound VAC exchange.    Review of Systems   Unable to perform ROS: Intubated     Objective:     Vital Signs (Most Recent):  Temp: 98.6 °F (37 °C) (02/09/24 1200)  Pulse: 86 (02/09/24 1300)  Resp: 16 (02/09/24 1300)  BP: (!) 155/70 (02/09/24 1300)  SpO2: 100 % (02/09/24 1300) Vital Signs (24h Range):  Temp:  [98.1 °F (36.7 °C)-98.8 °F (37.1 °C)] 98.6 °F (37 °C)  Pulse:  [85-96] 86  Resp:  [14-23] 16  SpO2:  [99 %-100 %] 100 %  BP: (146-187)/(64-79) 155/70  Arterial Line BP: (128-174)/(55-74) 150/66     Weight: (!) 153.3 kg (338 lb)  Body mass index is 56.25 kg/m².    Estimated Creatinine Clearance: 29.7 mL/min (A) (based on SCr of 3.3 mg/dL (H)).     Physical Exam  Vitals and nursing note reviewed.   Constitutional:       Appearance: She is well-developed. She is ill-appearing.      Interventions: She is intubated.   HENT:      Head: Normocephalic and atraumatic.      Right Ear: External ear normal.      Left Ear: External ear normal.   Eyes:      General: No scleral icterus.        Right eye: No discharge.         Left eye: No discharge.      Conjunctiva/sclera: Conjunctivae normal.   Cardiovascular:      Rate and Rhythm: Normal rate and regular rhythm.      Heart sounds: Normal heart sounds. No murmur heard.     No friction rub. No gallop.   Pulmonary:      Effort: Pulmonary effort is normal. No respiratory distress. She is intubated.      Breath sounds: No stridor. No wheezing, rhonchi or rales.   Abdominal:      General: Bowel sounds are  normal.   Musculoskeletal:         General: No tenderness.   Skin:     General: Skin is warm and dry.      Comments: Wound VAC on right groin area   Neurological:      Mental Status: She is lethargic.      Comments: Does not awaken to verbal or tactile stimuli.          Significant Labs: BMP:   Recent Labs   Lab 02/09/24  0335 02/09/24  1312    150*   * 133*   K 4.1 4.2    106   CO2 16* 19*   BUN 59* 37*   CREATININE 4.4* 3.3*   CALCIUM 8.0* 7.9*   MG 2.1  --        CBC:   Recent Labs   Lab 02/08/24  0302 02/09/24  0335   WBC 20.99* 16.02*  16.02*   HGB 7.3* 7.1*  7.1*   HCT 22.8* 22.7*  22.7*    387  387       Microbiology Results (last 7 days)       Procedure Component Value Units Date/Time    Culture, Anaerobe [[id number]]  (Abnormal) Collected: 01/30/24 0228    Order Status: Completed Specimen: Wound from Groin Updated: 02/07/24 0748     Anaerobic Culture No anaerobes isolated      BACTEROIDES SPECIES  Few      Narrative:      Right groin tissue    Culture, Anaerobe [[id number]] Collected: 01/30/24 0219    Order Status: Completed Specimen: Abscess from Groin Updated: 02/07/24 0748     Anaerobic Culture No anaerobes isolated    Narrative:      Right groin abcess    Blood culture x two cultures. Draw prior to antibiotics. [[id number]] Collected: 01/29/24 2118    Order Status: Completed Specimen: Blood from Peripheral, Antecubital, Left Updated: 02/02/24 2303     Blood Culture, Routine No Growth after 4 days.    Narrative:      Aerobic and anaerobic    Blood culture x two cultures. Draw prior to antibiotics. [[id number]] Collected: 01/29/24 1929    Order Status: Completed Specimen: Blood from Peripheral, Antecubital, Right Updated: 02/02/24 2103     Blood Culture, Routine No Growth after 4 days.    Narrative:      Aerobic and anaerobic            Significant Imaging: I have reviewed all pertinent imaging results/findings within the past 24 hours.

## 2024-02-09 NOTE — PROGRESS NOTES
[facility]  General Surgery  Progress Note    Subjective:     History of Present Illness:  53 year old morbidly obese female who does not routinely go to the doctor presents to the ED with malaise, nausea, vomiting, and groin, buttock, perineal swelling and tenderness. She began feeling ill a few days ago.     On arrival to the ED she is tachycardic to 120, hypertensive without fever. Labs demonstrate leukocytosis of 21, , with lactic acidosis and associated ALVARADO with cr 3.8, hyperglycemia with blood glucose of 824 accompanied by severe electrolyte derangements. CT imaging obtained demonstrates diffuse subcutaneous air along buttocks, perineum, anterior vulva, as well as bilateral thighs.     No prior abdominal surgeries. She denies problems with her heart or lungs. Non smoker. Does not routinely take any medications.    Post-Op Info:  Procedure(s) (LRB):  DEBRIDEMENT, WOUND, replace wound vac, possible closure (Right)   3 Days Post-Op     Interval History: No significant clinical changes. Ongoing workup for etiology of strokes. WBC continues to downtrend, now 16 from 21. AF, HDS    Medications:  Continuous Infusions:   dextrose 10 % in water (D10W)      dextrose 5 % and 0.45 % NaCl 50 mL/hr at 02/09/24 0600    insulin regular 1 units/mL infusion orderable (TRANSFER) 0.6 Units/hr (02/09/24 0600)     Scheduled Meds:   sodium chloride 0.9%   Intravenous Once    acetaminophen  650 mg Per OG tube Q6H    amLODIPine  10 mg Per OG tube Daily    carvediloL  6.25 mg Per OG tube BID    cefTRIAXone (Rocephin) IV (PEDS and ADULTS)  2 g Intravenous Q24H    famotidine  20 mg Per OG tube Daily    heparin (porcine)  7,500 Units Subcutaneous Q8H    insulin aspart U-100  10 Units Subcutaneous Q4H    metronidazole  500 mg Intravenous Q8H    psyllium husk (aspartame)  1 packet Per OG tube BID    sevelamer carbonate  1.6 g Oral TID WM     PRN Meds:sodium chloride 0.9%, albuterol-ipratropium, dextrose 10 %  in water (D10W), dextrose 10%, dextrose 10%, glucagon (human recombinant), glucose, glucose, hydrALAZINE, HYDROmorphone, insulin aspart U-100, labetalol, naloxone, ondansetron, oxyCODONE, oxyCODONE, prochlorperazine, sodium chloride 0.9%, sodium chloride 0.9%, sodium chloride 0.9%     Review of patient's allergies indicates:  No Known Allergies  Objective:     Vital Signs (Most Recent):  Temp: 98.6 °F (37 °C) (02/09/24 0700)  Pulse: 94 (02/09/24 0700)  Resp: 17 (02/09/24 0700)  BP: (!) 149/67 (02/09/24 0700)  SpO2: 100 % (02/09/24 0700) Vital Signs (24h Range):  Temp:  [98.1 °F (36.7 °C)-98.8 °F (37.1 °C)] 98.6 °F (37 °C)  Pulse:  [84-96] 94  Resp:  [14-23] 17  SpO2:  [98 %-100 %] 100 %  BP: (146-187)/(64-81) 149/67  Arterial Line BP: (128-174)/(55-74) 140/66     Weight: (!) 153.3 kg (338 lb)  Body mass index is 56.25 kg/m².    Intake/Output - Last 3 Shifts         02/07 0700  02/08 0659 02/08 0700 02/09 0659 02/09 0700  02/10 0659    I.V. (mL/kg) 113.7 (0.7) 885.2 (5.8)     Other   1000    NG/ 60     IV Piggyback 480.5 396.3     Total Intake(mL/kg) 1219.2 (8) 1341.6 (8.8) 1000 (6.5)    Urine (mL/kg/hr) 245 (0.1) 660 (0.2)     Other     Stool 0      Total Output     Net +774.2 +381.6 -2000           Stool Occurrence 1 x               Physical Exam  Vitals reviewed.   Constitutional:       Appearance: She is obese.   HENT:      Head: Normocephalic.      Mouth/Throat:      Mouth: Mucous membranes are moist.   Eyes:      Pupils: Pupils are equal, round, and reactive to light.      Comments: Bilateral R gaze   Neck:      Comments: RIJ Trialysis  Cardiovascular:      Rate and Rhythm: Normal rate and regular rhythm.   Pulmonary:      Effort: Pulmonary effort is normal.      Comments: Intubated  Abdominal:      General: Abdomen is flat.      Palpations: Abdomen is soft.   Genitourinary:     Comments: Vac in place with good seal   Glynn in place  Rectal tube  Musculoskeletal:      Right lower  leg: Edema present.      Left lower leg: Edema present.      Comments: R radial A-line   Skin:     General: Skin is warm.   Psychiatric:         Mood and Affect: Mood normal.          Significant Labs:  I have reviewed all pertinent lab results within the past 24 hours.  CBC:   Recent Labs   Lab 02/09/24  0335   WBC 16.02*  16.02*   RBC 2.68*  2.68*   HGB 7.1*  7.1*   HCT 22.7*  22.7*     387   MCV 85  85   MCH 26.5*  26.5*   MCHC 31.3*  31.3*     CMP:   Recent Labs   Lab 02/09/24  0335      CALCIUM 8.0*   ALBUMIN 1.8*   PROT 7.1   *   K 4.1   CO2 16*      BUN 59*   CREATININE 4.4*   ALKPHOS 129   ALT 10   AST 31   BILITOT 0.2       Significant Diagnostics:  I have reviewed all pertinent imaging results/findings within the past 24 hours.  Assessment/Plan:     * Ayaz's gangrene  53 y.o. 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     - Last W[place] change 2/6. To OR today for Vac change, consent obtained from the daughter  - Palliative following given overall poor prognosis, daughter would like everything done   - Daily SBTs  - Okay for DVT ppx   - Remainder of care per SICU        Chicho Dale MD  General Surgery  [facility]

## 2024-02-09 NOTE — SUBJECTIVE & OBJECTIVE
Interval History: ".  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 Taken to OR on 2/2 for debridement of the right buttocks and groin (#3). Evaluated by Neurology on 2/3 for encephalopathy. CT head with remote infarcts. Not withdrawing to pain on the morning of 2/5.    MRI done on 2/6 with embolic infarcts. S/P wound VAC exchange on 2/6. MRI brain revealed multiple embolic infarcts. Pending JAY today during wound VAC exchange.    Review of Systems   Unable to perform ROS: Intubated     Objective:     Vital Signs (Most Recent):  Temp: 98.6 °F (37 °C) (02/09/24 1200)  Pulse: 86 (02/09/24 1300)  Resp: 16 (02/09/24 1300)  BP: (!) 155/70 (02/09/24 1300)  SpO2: 100 % (02/09/24 1300) Vital Signs (24h Range):  Temp:  [98.1 °F (36.7 °C)-98.8 °F (37.1 °C)] 98.6 °F (37 °C)  Pulse:  [85-96] 86  Resp:  [14-23] 16  SpO2:  [99 %-100 %] 100 %  BP: (146-187)/(64-79) 155/70  Arterial Line BP: (128-174)/(55-74) 150/66     Weight: (!) 153.3 kg (338 lb)  Body mass index is 56.25 kg/m².    Estimated Creatinine Clearance: 29.7 mL/min (A) (based on SCr of 3.3 mg/dL (H)).     Physical Exam  Vitals and nursing note reviewed.   Constitutional:       Appearance: She is well-developed. She is ill-appearing.      Interventions: She is intubated.   HENT:      Head: Normocephalic and atraumatic.      Right Ear: External ear normal.      Left Ear: External ear normal.   Eyes:      General: No scleral icterus.        Right eye: No discharge.         Left eye: No discharge.      Conjunctiva/sclera: Conjunctivae normal.   Cardiovascular:      Rate and Rhythm: Normal rate and regular rhythm.      Heart sounds: Normal heart sounds. No murmur heard.     No friction rub. No gallop.   Pulmonary:      Effort: Pulmonary effort is normal. No respiratory distress. She is intubated.      Breath sounds: No stridor. No wheezing, rhonchi or rales.   Abdominal:      General: Bowel sounds are normal.    Musculoskeletal:         General: No tenderness.   Skin:     General: Skin is warm and dry.      Comments: Wound VAC on right groin area   Neurological:      Mental Status: She is lethargic.      Comments: Does not awaken to verbal or tactile stimuli.          Significant Labs: BMP:   Recent Labs   Lab 02/09/24  0335 02/09/24  1312    150*   * 133*   K 4.1 4.2    106   CO2 16* 19*   BUN 59* 37*   CREATININE 4.4* 3.3*   CALCIUM 8.0* 7.9*   MG 2.1  --        CBC:   Recent Labs   Lab 02/08/24  0302 02/09/24  0335   WBC 20.99* 16.02*  16.02*   HGB 7.3* 7.1*  7.1*   HCT 22.8* 22.7*  22.7*    387  387       Microbiology Results (last 7 days)       Procedure Component Value Units Date/Time    Culture, Anaerobe [[id number]]  (Abnormal) Collected: 01/30/24 0228    Order Status: Completed Specimen: Wound from Groin Updated: 02/07/24 0748     Anaerobic Culture No anaerobes isolated      BACTEROIDES SPECIES  Few      Narrative:      Right groin tissue    Culture, Anaerobe [[id number]] Collected: 01/30/24 0219    Order Status: Completed Specimen: Abscess from Groin Updated: 02/07/24 0748     Anaerobic Culture No anaerobes isolated    Narrative:      Right groin abcess    Blood culture x two cultures. Draw prior to antibiotics. [[id number]] Collected: 01/29/24 2118    Order Status: Completed Specimen: Blood from Peripheral, Antecubital, Left Updated: 02/02/24 2303     Blood Culture, Routine No Growth after 4 days.    Narrative:      Aerobic and anaerobic    Blood culture x two cultures. Draw prior to antibiotics. [[id number]] Collected: 01/29/24 1929    Order Status: Completed Specimen: Blood from Peripheral, Antecubital, Right Updated: 02/02/24 2103     Blood Culture, Routine No Growth after 4 days.    Narrative:      Aerobic and anaerobic            Significant Imaging: I have reviewed all pertinent imaging results/findings within the past 24 hours.

## 2024-02-09 NOTE — PROGRESS NOTES
"[facility]  Endocrinology  Progress Note    Admit Date: 1/29/2024     Reason for Consult: Management of T2DM, Hyperglycemia     Surgical Procedure and Date: n/a    Diabetes diagnosis year: new onset     Home Diabetes Medications:  none per chart review and family present at bedside     Lab Results   Component Value Date    HGBA1C 10.4 (H) 01/30/2024       Diabetes Complications include:     Hyperglycemia, DKA at OSH     Complicating diabetes co morbidities:   Obesity       HPI:   Patient is a 53 y.o. female with a diagnosis of obesity (BMI 53) admitted with N/V and R groin wound found to be in DKA at OSH. 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 While at OSH pt developed acute respiratory failure requiring intubation. Pulmonology was consulted for ventilator management and critical illness. Nephrology was consulted for worsening vishal in the setting of lactic acidosis and septic shock likely ATN, sCr elevated at 3.8 on admit now 4.0 post HD. Pt being admitted to SICU for surgical critical care management. Immediate plans include hemodynamic stabilization, weaning of respiratory support, and RRT.  Endocrinology consulted for management of T2DM.                 Interval HPI:   No acute events overnight. Patient in room 48548/82513 A. Blood glucose stable. BG at goal on current insulin regimen (Transition Insulin Drip). Steroid use- None .   2 Days Post-Op  Renal function- Abnormal - Cr 4.4   Vasopressors-  None      Diet NPO      Eating:   NPO  Nausea: No  Hypoglycemia and intervention: No  Fever: No  TPN and/or TF: Yes  If yes, type of TF/TPN and rate: 10cc/htr with plans to advance to 30 cc/hr    BP (!) 157/66   Pulse 91   Temp 98.6 °F (37 °C) (Oral)   Resp 16   Ht 5' 5" (1.651 m)   Wt (!) 153.3 kg (338 lb)   LMP 01/01/2024 (Approximate) Comment: tubal ligation  SpO2 100%   BMI 56.25 " "kg/m²     Labs Reviewed and Include    Recent Labs   Lab 02/09/24  0335      CALCIUM 8.0*   ALBUMIN 1.8*   PROT 7.1   *   K 4.1   CO2 16*      BUN 59*   CREATININE 4.4*   ALKPHOS 129   ALT 10   AST 31   BILITOT 0.2     Lab Results   Component Value Date    WBC 16.02 (H) 02/09/2024    WBC 16.02 (H) 02/09/2024    HGB 7.1 (L) 02/09/2024    HGB 7.1 (L) 02/09/2024    HCT 22.7 (L) 02/09/2024    HCT 22.7 (L) 02/09/2024    MCV 85 02/09/2024    MCV 85 02/09/2024     02/09/2024     02/09/2024     No results for input(s): "TSH", "FREET4" in the last 168 hours.  Lab Results   Component Value Date    HGBA1C 10.4 (H) 01/30/2024       Nutritional status:   Body mass index is 56.25 kg/m².  Lab Results   Component Value Date    ALBUMIN 1.8 (L) 02/09/2024    ALBUMIN 1.7 (L) 02/08/2024    ALBUMIN 1.8 (L) 02/07/2024    ALBUMIN 1.8 (L) 02/07/2024     No results found for: "PREALBUMIN"    Estimated Creatinine Clearance: 22.3 mL/min (A) (based on SCr of 4.4 mg/dL (H)).    Accu-Checks  Recent Labs     02/07/24  1927 02/08/24  0011 02/08/24  0302 02/08/24  0808 02/08/24  1203 02/08/24  1637 02/08/24  1952 02/08/24  2320 02/09/24  0334 02/09/24  0837   POCTGLUCOSE 184* 216* 169* 149* 182* 184* 191* 140* 113* 143*       Current Medications and/or Treatments Impacting Glycemic Control  Immunotherapy:    Immunosuppressants       None          Steroids:   Hormones (From admission, onward)      None          Pressors:    Autonomic Drugs (From admission, onward)      None          Hyperglycemia/Diabetes Medications:   Antihyperglycemics (From admission, onward)      Start     Stop Route Frequency Ordered    02/09/24 1200  insulin aspart U-100 pen 6 Units         -- SubQ Every 4 hours 02/09/24 0901 02/09/24 0915  insulin regular in 0.9 % NaCl 100 unit/100 mL (1 unit/mL) infusion        Question:  Enter initial dose (Units/hr):  Answer:  0.7    -- IV Continuous 02/09/24 0901 02/03/24 1010  insulin aspart U-100 " pen 0-10 Units         -- SubQ Every 4 hours PRN 02/03/24 0911            ASSESSMENT and PLAN    Renal/  * Ayaz's gangrene  Managed per primary team  Optimize BG control        ALVARADO (acute kidney injury)  Lab Results   Component Value Date    CREATININE 4.4 (H) 02/09/2024     Avoid insulin stacking  Titrate insulin slowly       Endocrine  New onset type 2 diabetes mellitus  BG goal 140-180  No previous hx of T2DM per family at bedside. A1c of 10.4. DKA at OSH. TF on and off pending NPO status.  Will decrease scheduled amount as restarted on trickle.    Plan:  -Adjust Transition IV insulin infusion at 0.7 u/hr with stepdown parameters   -Decrease Novolog 6 units q 4 hrs while on tube feeding (HOLD if tube feeding is stopped or BG < 100)   -Continue Moderate Dose Correction Scale  -BG monitoring q 4 hrs while NPO     ** Please call Endocrine for any BG related issues **      Discharge plans: TBD    Lab Results   Component Value Date    HGBA1C 10.4 (H) 01/30/2024             Salvador Alfaro PA-C  Endocrinology  [facility]

## 2024-02-10 LAB
ABO + RH BLD: NORMAL
ALBUMIN SERPL BCP-MCNC: 2.1 G/DL (ref 3.5–5.2)
ALBUMIN SERPL BCP-MCNC: 2.1 G/DL (ref 3.5–5.2)
ALP SERPL-CCNC: 132 U/L (ref 55–135)
ALP SERPL-CCNC: 132 U/L (ref 55–135)
ALT SERPL W/O P-5'-P-CCNC: 11 U/L (ref 10–44)
ALT SERPL W/O P-5'-P-CCNC: 11 U/L (ref 10–44)
ANION GAP SERPL CALC-SCNC: 12 MMOL/L (ref 8–16)
ANION GAP SERPL CALC-SCNC: 12 MMOL/L (ref 8–16)
AST SERPL-CCNC: 27 U/L (ref 10–40)
AST SERPL-CCNC: 27 U/L (ref 10–40)
BASOPHILS # BLD AUTO: 0.04 K/UL (ref 0–0.2)
BASOPHILS NFR BLD: 0.3 % (ref 0–1.9)
BILIRUB SERPL-MCNC: 0.3 MG/DL (ref 0.1–1)
BILIRUB SERPL-MCNC: 0.3 MG/DL (ref 0.1–1)
BLD GP AB SCN CELLS X3 SERPL QL: NORMAL
BUN SERPL-MCNC: 41 MG/DL (ref 6–20)
BUN SERPL-MCNC: 41 MG/DL (ref 6–20)
CALCIUM SERPL-MCNC: 8.1 MG/DL (ref 8.7–10.5)
CALCIUM SERPL-MCNC: 8.1 MG/DL (ref 8.7–10.5)
CHLORIDE SERPL-SCNC: 104 MMOL/L (ref 95–110)
CHLORIDE SERPL-SCNC: 104 MMOL/L (ref 95–110)
CO2 SERPL-SCNC: 17 MMOL/L (ref 23–29)
CO2 SERPL-SCNC: 17 MMOL/L (ref 23–29)
CREAT SERPL-MCNC: 3.5 MG/DL (ref 0.5–1.4)
CREAT SERPL-MCNC: 3.5 MG/DL (ref 0.5–1.4)
DIFFERENTIAL METHOD BLD: ABNORMAL
EOSINOPHIL # BLD AUTO: 0.2 K/UL (ref 0–0.5)
EOSINOPHIL NFR BLD: 1.2 % (ref 0–8)
ERYTHROCYTE [DISTWIDTH] IN BLOOD BY AUTOMATED COUNT: 18.1 % (ref 11.5–14.5)
EST. GFR  (NO RACE VARIABLE): 15 ML/MIN/1.73 M^2
EST. GFR  (NO RACE VARIABLE): 15 ML/MIN/1.73 M^2
GLUCOSE SERPL-MCNC: 179 MG/DL (ref 70–110)
GLUCOSE SERPL-MCNC: 179 MG/DL (ref 70–110)
HCT VFR BLD AUTO: 21.4 % (ref 37–48.5)
HGB BLD-MCNC: 7 G/DL (ref 12–16)
IMM GRANULOCYTES # BLD AUTO: 0.5 K/UL (ref 0–0.04)
IMM GRANULOCYTES NFR BLD AUTO: 3.2 % (ref 0–0.5)
LYMPHOCYTES # BLD AUTO: 1.7 K/UL (ref 1–4.8)
LYMPHOCYTES NFR BLD: 11 % (ref 18–48)
MAGNESIUM SERPL-MCNC: 1.9 MG/DL (ref 1.6–2.6)
MCH RBC QN AUTO: 26.9 PG (ref 27–31)
MCHC RBC AUTO-ENTMCNC: 32.7 G/DL (ref 32–36)
MCV RBC AUTO: 82 FL (ref 82–98)
MONOCYTES # BLD AUTO: 1.4 K/UL (ref 0.3–1)
MONOCYTES NFR BLD: 8.9 % (ref 4–15)
NEUTROPHILS # BLD AUTO: 11.6 K/UL (ref 1.8–7.7)
NEUTROPHILS NFR BLD: 75.4 % (ref 38–73)
NRBC BLD-RTO: 0 /100 WBC
PHOSPHATE SERPL-MCNC: 7.7 MG/DL (ref 2.7–4.5)
PLATELET # BLD AUTO: 387 K/UL (ref 150–450)
PMV BLD AUTO: 10.1 FL (ref 9.2–12.9)
POCT GLUCOSE: 134 MG/DL (ref 70–110)
POCT GLUCOSE: 160 MG/DL (ref 70–110)
POCT GLUCOSE: 170 MG/DL (ref 70–110)
POCT GLUCOSE: 175 MG/DL (ref 70–110)
POCT GLUCOSE: 178 MG/DL (ref 70–110)
POCT GLUCOSE: 198 MG/DL (ref 70–110)
POCT GLUCOSE: 201 MG/DL (ref 70–110)
POTASSIUM SERPL-SCNC: 4.3 MMOL/L (ref 3.5–5.1)
POTASSIUM SERPL-SCNC: 4.3 MMOL/L (ref 3.5–5.1)
PROT SERPL-MCNC: 7 G/DL (ref 6–8.4)
PROT SERPL-MCNC: 7 G/DL (ref 6–8.4)
RBC # BLD AUTO: 2.6 M/UL (ref 4–5.4)
SODIUM SERPL-SCNC: 133 MMOL/L (ref 136–145)
SODIUM SERPL-SCNC: 133 MMOL/L (ref 136–145)
SPECIMEN OUTDATE: NORMAL
WBC # BLD AUTO: 15.42 K/UL (ref 3.9–12.7)

## 2024-02-10 PROCEDURE — 99900026 HC AIRWAY MAINTENANCE (STAT)

## 2024-02-10 PROCEDURE — 99900035 HC TECH TIME PER 15 MIN (STAT)

## 2024-02-10 PROCEDURE — 25000003 PHARM REV CODE 250: Performed by: PHYSICIAN ASSISTANT

## 2024-02-10 PROCEDURE — 27100171 HC OXYGEN HIGH FLOW UP TO 24 HOURS

## 2024-02-10 PROCEDURE — 80053 COMPREHEN METABOLIC PANEL: CPT

## 2024-02-10 PROCEDURE — 83735 ASSAY OF MAGNESIUM: CPT | Performed by: STUDENT IN AN ORGANIZED HEALTH CARE EDUCATION/TRAINING PROGRAM

## 2024-02-10 PROCEDURE — 63600175 PHARM REV CODE 636 W HCPCS: Mod: JZ,JG | Performed by: INTERNAL MEDICINE

## 2024-02-10 PROCEDURE — 25000003 PHARM REV CODE 250

## 2024-02-10 PROCEDURE — 86850 RBC ANTIBODY SCREEN: CPT | Performed by: STUDENT IN AN ORGANIZED HEALTH CARE EDUCATION/TRAINING PROGRAM

## 2024-02-10 PROCEDURE — 25000242 PHARM REV CODE 250 ALT 637 W/ HCPCS

## 2024-02-10 PROCEDURE — 63600175 PHARM REV CODE 636 W HCPCS: Performed by: HOSPITALIST

## 2024-02-10 PROCEDURE — 99233 SBSQ HOSP IP/OBS HIGH 50: CPT | Mod: ,,, | Performed by: INTERNAL MEDICINE

## 2024-02-10 PROCEDURE — 84100 ASSAY OF PHOSPHORUS: CPT | Performed by: STUDENT IN AN ORGANIZED HEALTH CARE EDUCATION/TRAINING PROGRAM

## 2024-02-10 PROCEDURE — 27000923 HC TRIALYSIS CATHETER, ANY SIZE

## 2024-02-10 PROCEDURE — 63600175 PHARM REV CODE 636 W HCPCS

## 2024-02-10 PROCEDURE — 25000003 PHARM REV CODE 250: Performed by: STUDENT IN AN ORGANIZED HEALTH CARE EDUCATION/TRAINING PROGRAM

## 2024-02-10 PROCEDURE — 94003 VENT MGMT INPAT SUBQ DAY: CPT

## 2024-02-10 PROCEDURE — 85025 COMPLETE CBC W/AUTO DIFF WBC: CPT

## 2024-02-10 PROCEDURE — 94761 N-INVAS EAR/PLS OXIMETRY MLT: CPT

## 2024-02-10 PROCEDURE — 99232 SBSQ HOSP IP/OBS MODERATE 35: CPT | Mod: ,,, | Performed by: PHYSICIAN ASSISTANT

## 2024-02-10 PROCEDURE — 99233 SBSQ HOSP IP/OBS HIGH 50: CPT | Mod: ,,, | Performed by: PSYCHIATRY & NEUROLOGY

## 2024-02-10 PROCEDURE — 99291 CRITICAL CARE FIRST HOUR: CPT | Mod: 24,,, | Performed by: STUDENT IN AN ORGANIZED HEALTH CARE EDUCATION/TRAINING PROGRAM

## 2024-02-10 PROCEDURE — 20000000 HC ICU ROOM

## 2024-02-10 RX ORDER — ALBUMIN HUMAN 50 G/1000ML
SOLUTION INTRAVENOUS
Status: COMPLETED
Start: 2024-02-10 | End: 2024-02-10

## 2024-02-10 RX ADMIN — HEPARIN SODIUM 7500 UNITS: 5000 INJECTION INTRAVENOUS; SUBCUTANEOUS at 05:02

## 2024-02-10 RX ADMIN — HYDRALAZINE HYDROCHLORIDE 10 MG: 20 INJECTION, SOLUTION INTRAMUSCULAR; INTRAVENOUS at 04:02

## 2024-02-10 RX ADMIN — INSULIN ASPART 2 UNITS: 100 INJECTION, SOLUTION INTRAVENOUS; SUBCUTANEOUS at 01:02

## 2024-02-10 RX ADMIN — HEPARIN SODIUM 7500 UNITS: 5000 INJECTION INTRAVENOUS; SUBCUTANEOUS at 01:02

## 2024-02-10 RX ADMIN — INSULIN ASPART 6 UNITS: 100 INJECTION, SOLUTION INTRAVENOUS; SUBCUTANEOUS at 12:02

## 2024-02-10 RX ADMIN — INSULIN ASPART 6 UNITS: 100 INJECTION, SOLUTION INTRAVENOUS; SUBCUTANEOUS at 03:02

## 2024-02-10 RX ADMIN — METRONIDAZOLE 500 MG: 5 INJECTION, SOLUTION INTRAVENOUS at 01:02

## 2024-02-10 RX ADMIN — SEVELAMER CARBONATE 1.6 G: 800 POWDER, FOR SUSPENSION ORAL at 04:02

## 2024-02-10 RX ADMIN — INSULIN ASPART 1 UNITS: 100 INJECTION, SOLUTION INTRAVENOUS; SUBCUTANEOUS at 03:02

## 2024-02-10 RX ADMIN — SEVELAMER CARBONATE 1.6 G: 800 POWDER, FOR SUSPENSION ORAL at 01:02

## 2024-02-10 RX ADMIN — METRONIDAZOLE 500 MG: 5 INJECTION, SOLUTION INTRAVENOUS at 05:02

## 2024-02-10 RX ADMIN — FAMOTIDINE 20 MG: 20 TABLET, FILM COATED ORAL at 08:02

## 2024-02-10 RX ADMIN — INSULIN ASPART 6 UNITS: 100 INJECTION, SOLUTION INTRAVENOUS; SUBCUTANEOUS at 01:02

## 2024-02-10 RX ADMIN — CEFTRIAXONE 2 G: 2 INJECTION, POWDER, FOR SOLUTION INTRAMUSCULAR; INTRAVENOUS at 08:02

## 2024-02-10 RX ADMIN — INSULIN ASPART 2 UNITS: 100 INJECTION, SOLUTION INTRAVENOUS; SUBCUTANEOUS at 04:02

## 2024-02-10 RX ADMIN — SEVELAMER CARBONATE 1.6 G: 800 POWDER, FOR SUSPENSION ORAL at 08:02

## 2024-02-10 RX ADMIN — PSYLLIUM HUSK 1 PACKET: 3.4 POWDER ORAL at 08:02

## 2024-02-10 RX ADMIN — PSYLLIUM HUSK 1 PACKET: 3.4 POWDER ORAL at 09:02

## 2024-02-10 RX ADMIN — HEPARIN SODIUM 7500 UNITS: 5000 INJECTION INTRAVENOUS; SUBCUTANEOUS at 09:02

## 2024-02-10 RX ADMIN — CARVEDILOL 12.5 MG: 12.5 TABLET, FILM COATED ORAL at 08:02

## 2024-02-10 RX ADMIN — INSULIN ASPART 6 UNITS: 100 INJECTION, SOLUTION INTRAVENOUS; SUBCUTANEOUS at 04:02

## 2024-02-10 RX ADMIN — INSULIN ASPART 1 UNITS: 100 INJECTION, SOLUTION INTRAVENOUS; SUBCUTANEOUS at 07:02

## 2024-02-10 RX ADMIN — CARVEDILOL 12.5 MG: 12.5 TABLET, FILM COATED ORAL at 09:02

## 2024-02-10 RX ADMIN — METRONIDAZOLE 500 MG: 5 INJECTION, SOLUTION INTRAVENOUS at 03:02

## 2024-02-10 RX ADMIN — INSULIN HUMAN 0.6 UNITS/HR: 1 INJECTION, SOLUTION INTRAVENOUS at 04:02

## 2024-02-10 RX ADMIN — INSULIN ASPART 6 UNITS: 100 INJECTION, SOLUTION INTRAVENOUS; SUBCUTANEOUS at 07:02

## 2024-02-10 RX ADMIN — ACETAMINOPHEN 650 MG: 325 TABLET ORAL at 12:02

## 2024-02-10 RX ADMIN — AMLODIPINE BESYLATE 10 MG: 10 TABLET ORAL at 08:02

## 2024-02-10 RX ADMIN — ACETAMINOPHEN 650 MG: 325 TABLET ORAL at 05:02

## 2024-02-10 RX ADMIN — INSULIN ASPART 1 UNITS: 100 INJECTION, SOLUTION INTRAVENOUS; SUBCUTANEOUS at 12:02

## 2024-02-10 NOTE — SUBJECTIVE & OBJECTIVE
Interval History: No acute events overnight. JAY yesterday unrevealing. Wound vac changed with partial wound closure in the OR yesterday, patient tolerated well. No changes to neuro exam.    Medications:  Continuous Infusions:   dextrose 10 % in water (D10W)      insulin regular 1 units/mL infusion orderable (TRANSFER) 0.7 Units/hr (02/10/24 0700)     Scheduled Meds:   sodium chloride 0.9%   Intravenous Once    amLODIPine  10 mg Per OG tube Daily    carvediloL  12.5 mg Per OG tube BID    cefTRIAXone (Rocephin) IV (PEDS and ADULTS)  2 g Intravenous Q24H    famotidine  20 mg Per OG tube Daily    heparin (porcine)  7,500 Units Subcutaneous Q8H    insulin aspart U-100  6 Units Subcutaneous Q4H    metronidazole  500 mg Intravenous Q8H    psyllium husk (aspartame)  1 packet Per OG tube BID    sevelamer carbonate  1.6 g Per OG tube TID WM     PRN Meds:sodium chloride 0.9%, albuterol-ipratropium, dextrose 10 % in water (D10W), dextrose 10%, dextrose 10%, glucagon (human recombinant), glucose, glucose, hydrALAZINE, insulin aspart U-100, labetalol, naloxone, ondansetron, oxyCODONE, oxyCODONE, prochlorperazine, sodium chloride 0.9%, sodium chloride 0.9%, sodium chloride 0.9%     Review of patient's allergies indicates:  No Known Allergies  Objective:     Vital Signs (Most Recent):  Temp: 99.5 °F (37.5 °C) (02/10/24 0701)  Pulse: 93 (02/10/24 0739)  Resp: 20 (02/10/24 0739)  BP: (!) 161/71 (02/10/24 0300)  SpO2: 100 % (02/10/24 0739) Vital Signs (24h Range):  Temp:  [98.3 °F (36.8 °C)-99.5 °F (37.5 °C)] 99.5 °F (37.5 °C)  Pulse:  [85-98] 93  Resp:  [15-22] 20  SpO2:  [97 %-100 %] 100 %  BP: (139-197)/(65-88) 161/71  Arterial Line BP: (124-196)/(53-77) 139/54     Weight: (!) 153.3 kg (338 lb)  Body mass index is 56.25 kg/m².    Intake/Output - Last 3 Shifts         02/08 0700 02/09 0659 02/09 0700  02/10 0659 02/10 0700  02/11 0659    I.V. (mL/kg) 885.2 (5.8) 952.3 (6.2) 0.7 (0)    Other  1000     NG/GT 60 30 20    IV Piggyback  396.3 495.4     Total Intake(mL/kg) 1341.6 (8.8) 2477.6 (16.2) 20.7 (0.1)    Urine (mL/kg/hr) 660 (0.2) 615 (0.2) 30 (0.1)    Other 300 3150 0    Stool  100     Total Output 960 3865 30    Net +381.6 -1387.4 -9.3                    Physical Exam  Cardiovascular:      Rate and Rhythm: Normal rate.   Pulmonary:      Comments: Intubated  Vent Mode: Spont  Oxygen Concentration (%):  [40] 40  Resp Rate Total:  [15 br/min-21 br/min] 21 br/min  PEEP/CPAP:  [5 cmH20] 5 cmH20  Pressure Support:  [10 cmH20] 10 cmH20  Mean Airway Pressure:  [8.4 cmH20-9.2 cmH20] 8.8 cmH20      Abdominal:      General: There is no distension.      Palpations: Abdomen is soft.      Tenderness: There is no abdominal tenderness.   Skin:     General: Skin is warm and dry.      Comments: Wound vac in place to R thigh/groin to adequate suction          Significant Labs:  I have reviewed all pertinent lab results within the past 24 hours.  CBC:   Recent Labs   Lab 02/10/24  0333   WBC 15.42*   RBC 2.60*   HGB 7.0*   HCT 21.4*      MCV 82   MCH 26.9*   MCHC 32.7     BMP:   Recent Labs   Lab 02/10/24  0333   *  179*   *  133*   K 4.3  4.3     104   CO2 17*  17*   BUN 41*  41*   CREATININE 3.5*  3.5*   CALCIUM 8.1*  8.1*   MG 1.9       Significant Diagnostics:  I have reviewed all pertinent imaging results/findings within the past 24 hours.

## 2024-02-10 NOTE — SUBJECTIVE & OBJECTIVE
"Interval HPI:   No acute events overnight. Patient in room 56828/68048 A. Blood glucose stable. BG at goal on current insulin regimen (Transition Insulin Drip). Steroid use- None .   1 Day Post-Op  Renal function- Abnormal -   Vasopressors-  None     Diet NPO     Eating:   NPO  Nausea: No  Hypoglycemia and intervention: No  Fever: No  TPN and/or TF: Yes    BP (!) 145/65 (BP Location: Left arm, Patient Position: Lying)   Pulse 88   Temp 99.5 °F (37.5 °C) (Oral)   Resp 17   Ht 5' 5" (1.651 m)   Wt (!) 153.3 kg (338 lb)   LMP 01/01/2024 (Approximate) Comment: tubal ligation  SpO2 100%   BMI 56.25 kg/m²     Labs Reviewed and Include    Recent Labs   Lab 02/10/24  0333   *  179*   CALCIUM 8.1*  8.1*   ALBUMIN 2.1*  2.1*   PROT 7.0  7.0   *  133*   K 4.3  4.3   CO2 17*  17*     104   BUN 41*  41*   CREATININE 3.5*  3.5*   ALKPHOS 132  132   ALT 11  11   AST 27  27   BILITOT 0.3  0.3     Lab Results   Component Value Date    WBC 15.42 (H) 02/10/2024    HGB 7.0 (L) 02/10/2024    HCT 21.4 (L) 02/10/2024    MCV 82 02/10/2024     02/10/2024     No results for input(s): "TSH", "FREET4" in the last 168 hours.  Lab Results   Component Value Date    HGBA1C 10.4 (H) 01/30/2024       Nutritional status:   Body mass index is 56.25 kg/m².  Lab Results   Component Value Date    ALBUMIN 2.1 (L) 02/10/2024    ALBUMIN 2.1 (L) 02/10/2024    ALBUMIN 1.7 (L) 02/09/2024     No results found for: "PREALBUMIN"    Estimated Creatinine Clearance: 28 mL/min (A) (based on SCr of 3.5 mg/dL (H)).    Accu-Checks  Recent Labs     02/08/24 1952 02/08/24 2320 02/09/24  0334 02/09/24  0837 02/09/24  1311 02/09/24  1711 02/09/24  2117 02/10/24  0004 02/10/24  0333 02/10/24  0743   POCTGLUCOSE 191* 140* 113* 143* 148* 170* 201* 198* 178* 134*       Current Medications and/or Treatments Impacting Glycemic Control  Immunotherapy:    Immunosuppressants       None          Steroids:   Hormones (From admission, " onward)      None          Pressors:    Autonomic Drugs (From admission, onward)      None          Hyperglycemia/Diabetes Medications:   Antihyperglycemics (From admission, onward)      Start     Stop Route Frequency Ordered    02/09/24 1200  insulin aspart U-100 pen 6 Units         -- SubQ Every 4 hours 02/09/24 0901 02/09/24 0915  insulin regular in 0.9 % NaCl 100 unit/100 mL (1 unit/mL) infusion        Question:  Enter initial dose (Units/hr):  Answer:  0.7    -- IV Continuous 02/09/24 0901    02/03/24 1010  insulin aspart U-100 pen 0-10 Units         -- SubQ Every 4 hours PRN 02/03/24 0911

## 2024-02-10 NOTE — CARE UPDATE
Patient is in need of an MRI brain secondary to multiple infarcts.  Vascular neurology is requesting an MRI with contrast.  The benefits of this outway the risks of receiving contrast at this time given her clinical picture and is necessary for prognostication.  Consent for contrast administration will be obtained from the family of the patient explaining the potential risks of receiving IV contrast with her renal disease.    Enzo Bagley MD - PGY V  General Surgery

## 2024-02-10 NOTE — OP NOTE
[facility] - Surgical Intensive Care  Surgery Department  Operative Note    SUMMARY     Date of Procedure: 2/9/2024     Procedure: Procedure(s) (LRB):  DEBRIDEMENT, WOUND w wound vac change (Right)     Surgeon(s) and Role:     * Steve Cole MD - Primary     * Marisa Fung MD - Resident - Assisting     * Jean Claude Lara MD - Resident - Assisting        Pre-Operative Diagnosis: 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]    Post-Operative Diagnosis: Post-Op Diagnosis Codes:     * 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]    Anesthesia: General    Indication: Sarah Saravia is a 53 y.o. 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 She was taken to the OR today for repeat debridement and wound vac placement     Operative Findings (including complications, if any): Fibrinous exudate  Healthy granulation tissue    Description of Technical Procedures:   After informed consent was obtained, the patient was transported from the SICU to the Operating Room. She was placed in the lithotomy position on the operating table and padded appropriately. Monitors were applied. General anesthesia was induced without issue. The patients wound vac dressings were taken down. She was prepped and draped in the standard sterile surgical fashion. A timeout was performed, and all persons present were satisfied that this was the correct procedure on the correct patient. We focused our attention to the thigh wound first. Fibrinous exudate was noted as well as healthy granulation tissue. We debrided the fibrinous exudate. Next we proceeded with closing approximately 12 cm of the inferior aspect of the thigh wound with nylon sutures in a interrupted and vertical mattress fashion. We then proceeded with placing a black sponge into the wound and tunneling it down. Following this we inspected the groin wound. Fibrinous exudate and healthy granulation tissue were visualized. The fibrinous exudate was  debrided. A black sponge was placed into the wound. Both wounds were hemostatic at the end of the case and were measured. The groin wound measured 14.5 x 5.5 x 5 cm and the thigh wound measured 34 x 8 x 7.5 cm. Both wounds were y-ed together and adequate suction/seal was achieved. The patient remained intubated in the operating room and transported to the SICU in stable condition. All sponge, instrument, and needle counts were reported as correct at the end of the procedure      Estimated Blood Loss (EBL): <2 cc                    Condition: Stable    Disposition: ICU - intubated and hemodynamically stable.

## 2024-02-10 NOTE — ASSESSMENT & PLAN NOTE
Ayaz's gangrene s/p I&D x 2 (1/29 & 1/31) of right proximal medial thigh. Operative cultures showing P mirabilis. S/P wound VAC exchange on 2/6. S/p wound VAC exchange and debridement on 2/9. Afebrile (last 24 hour Tmax - 99.5) and with down trend in leukocytosis (WBC 2/10 - 15.42).  Managed by SICU.   Recommendations from Infectious Disease included:  Continue ceftriaxone 2 gm IV daily.  Continue metronidazole.   Recommend at least 2 weeks of therapy from 2/9. Anticipated end date: 2/22/24.  Monitor CBC and CMP weekly while on antibiotics.  Re-consult closer to antibiotic end date for further antibiotic recommendations if patient remains stable.  Surgical debridements as indicated for non viable tissue as per Surgical Service.  Discussed management plan with the staff and/or members from SICU and General Surgery service.

## 2024-02-10 NOTE — SUBJECTIVE & OBJECTIVE
Interval History: ".  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 Taken to OR on 2/2 for debridement of the right buttocks and groin (#3). Evaluated by Neurology on 2/3 for encephalopathy. CT head with remote infarcts. Not withdrawing to pain on the morning of 2/5.    MRI done on 2/6 with embolic infarcts. S/P wound VAC exchange on 2/6. MRI brain revealed multiple embolic infarcts. Wound VAC exchange with debridement of fibrinous tissue, wound approximation and wound VAC placement on 2/9. Intraoperative JAY on 2/9 without cardiac abnormalities per EMR review.    Review of Systems   Unable to perform ROS: Intubated     Objective:     Vital Signs (Most Recent):  Temp: 99.5 °F (37.5 °C) (02/10/24 0701)  Pulse: 88 (02/10/24 0900)  Resp: 17 (02/10/24 0900)  BP: (!) 145/65 (02/10/24 0900)  SpO2: 100 % (02/10/24 0900) Vital Signs (24h Range):  Temp:  [98.3 °F (36.8 °C)-99.5 °F (37.5 °C)] 99.5 °F (37.5 °C)  Pulse:  [85-98] 88  Resp:  [15-22] 17  SpO2:  [97 %-100 %] 100 %  BP: (139-197)/(65-88) 145/65  Arterial Line BP: (124-196)/(53-77) 126/56     Weight: (!) 153.3 kg (338 lb)  Body mass index is 56.25 kg/m².    Estimated Creatinine Clearance: 28 mL/min (A) (based on SCr of 3.5 mg/dL (H)).     Physical Exam  Vitals and nursing note reviewed.   Constitutional:       Appearance: She is well-developed. She is ill-appearing.      Interventions: She is intubated.   HENT:      Head: Normocephalic and atraumatic.      Right Ear: External ear normal.      Left Ear: External ear normal.   Eyes:      General: No scleral icterus.        Right eye: No discharge.         Left eye: No discharge.      Conjunctiva/sclera: Conjunctivae normal.   Cardiovascular:      Rate and Rhythm: Normal rate and regular rhythm.      Heart sounds: Normal heart sounds. No murmur heard.     No friction rub. No gallop.   Pulmonary:      Effort: Pulmonary effort is normal. No respiratory distress. She is  intubated.      Breath sounds: No stridor. No wheezing, rhonchi or rales.   Abdominal:      General: Bowel sounds are normal.   Musculoskeletal:         General: No tenderness.   Skin:     General: Skin is warm and dry.      Comments: Wound VAC on right groin area   Neurological:      Mental Status: She is lethargic.      Comments: Opens left eye to verbal stimuli however non purposeful. Does not track.          Significant Labs: BMP:   Recent Labs   Lab 02/10/24  0333   *  179*   *  133*   K 4.3  4.3     104   CO2 17*  17*   BUN 41*  41*   CREATININE 3.5*  3.5*   CALCIUM 8.1*  8.1*   MG 1.9       CBC:   Recent Labs   Lab 02/09/24  0335 02/10/24  0333   WBC 16.02*  16.02* 15.42*   HGB 7.1*  7.1* 7.0*   HCT 22.7*  22.7* 21.4*     [phone]       Microbiology Results (last 7 days)       Procedure Component Value Units Date/Time    Culture, Anaerobe [[id number]]  (Abnormal) Collected: 01/30/24 0228    Order Status: Completed Specimen: Wound from Groin Updated: 02/07/24 0748     Anaerobic Culture No anaerobes isolated      BACTEROIDES SPECIES  Few      Narrative:      Right groin tissue    Culture, Anaerobe [[id number]] Collected: 01/30/24 0219    Order Status: Completed Specimen: Abscess from Groin Updated: 02/07/24 0748     Anaerobic Culture No anaerobes isolated    Narrative:      Right groin abcess            Significant Imaging: I have reviewed all pertinent imaging results/findings within the past 24 hours.

## 2024-02-10 NOTE — ASSESSMENT & PLAN NOTE
BG goal 140-180  No previous hx of T2DM per family at bedside. A1c of 10.4. DKA at OSH. TF on and off . Will decrease scheduled amount as restarted on trickle.    Plan:  Continue transition IV insulin infusion at 0.7 u/hr with stepdown parameters    Continue Novolog 6 units q 4 hrs while on tube feeding (HOLD if tube feeding is stopped or BG < 100)   -Continue Moderate Dose Correction Scale  -BG monitoring q 4 hrs while NPO     ** Please call Endocrine for any BG related issues **      Discharge plans: TBD    Lab Results   Component Value Date    HGBA1C 10.4 (H) 01/30/2024

## 2024-02-10 NOTE — SUBJECTIVE & OBJECTIVE
Neurologic Chief Complaint: multi territory infarcts as well as abnormal restricted diffusion on L temporal lobe concerning for possible infarct/septic emboli    Subjective:     Interval History: Patient is seen for follow-up neurological assessment and treatment recommendations: NIH 30. Remains in NCC. Intubated. MRI/MRA completed, showing left temporal lobe lesion improving and less prominent. MRV was negative for venous sinus thrombosis.     HPI, Past Medical, Family, and Social History remains the same as documented in the initial encounter.     Review of Systems   Unable to perform ROS: Intubated     Scheduled Meds:   sodium chloride 0.9%   Intravenous Once    amLODIPine  10 mg Per OG tube Daily    carvediloL  12.5 mg Per OG tube BID    cefTRIAXone (Rocephin) IV (PEDS and ADULTS)  2 g Intravenous Q24H    famotidine  20 mg Per OG tube Daily    heparin (porcine)  7,500 Units Subcutaneous Q8H    insulin aspart U-100  6 Units Subcutaneous Q4H    metronidazole  500 mg Intravenous Q8H    psyllium husk (aspartame)  1 packet Per OG tube BID    sevelamer carbonate  1.6 g Per OG tube TID WM     Continuous Infusions:   dextrose 10 % in water (D10W)      insulin regular 1 units/mL infusion orderable (TRANSFER) 0.6 Units/hr (02/10/24 1610)     PRN Meds:sodium chloride 0.9%, albuterol-ipratropium, dextrose 10 % in water (D10W), dextrose 10%, dextrose 10%, glucagon (human recombinant), glucose, glucose, hydrALAZINE, insulin aspart U-100, labetalol, naloxone, ondansetron, oxyCODONE, oxyCODONE, prochlorperazine, sodium chloride 0.9%, sodium chloride 0.9%, sodium chloride 0.9%    Objective:     Vital Signs (Most Recent):  Temp: 98.5 °F (36.9 °C) (02/10/24 1501)  Pulse: 85 (02/10/24 1600)  Resp: 15 (02/10/24 1600)  BP: (!) 162/67 (02/10/24 1600)  SpO2: 98 % (02/10/24 1600)  BP Location: Left arm    Vital Signs Range (Last 24H):  Temp:  [98.3 °F (36.8 °C)-99.5 °F (37.5 °C)]   Pulse:  [85-98]   Resp:  [15-32]   BP:  "(139-179)/(65-80)   SpO2:  [96 %-100 %]   Arterial Line BP: ()/(23-73)   BP Location: Left arm       Physical Exam  Vitals reviewed.   Constitutional:       Appearance: She is ill-appearing and toxic-appearing.   HENT:      Head: Normocephalic.   Pulmonary:      Comments: Intubated  Abdominal:      Palpations: Abdomen is soft.   Neurological:      Comments: Not following commands, not sedated.              Neurological Exam:   LOC: drowsy  Attention Span: poor  Language: Intubated  Articulation: Untestable due to intubation  Orientation: Person, Place, Time , Untestable due to intubation    Laboratory:  CMP:   Recent Labs   Lab 02/10/24  0333   CALCIUM 8.1*  8.1*   ALBUMIN 2.1*  2.1*   PROT 7.0  7.0   *  133*   K 4.3  4.3   CO2 17*  17*     104   BUN 41*  41*   CREATININE 3.5*  3.5*   ALKPHOS 132  132   ALT 11  11   AST 27  27   BILITOT 0.3  0.3     BMP:   Recent Labs   Lab 02/10/24  0333   *  133*   K 4.3  4.3     104   CO2 17*  17*   BUN 41*  41*   CREATININE 3.5*  3.5*   CALCIUM 8.1*  8.1*     CBC:   Recent Labs   Lab 02/10/24  0333   WBC 15.42*   RBC 2.60*   HGB 7.0*   HCT 21.4*      MCV 82   MCH 26.9*   MCHC 32.7     Lipid Panel: No results for input(s): "CHOL", "LDLCALC", "HDL", "TRIG" in the last 168 hours.  Coagulation: No results for input(s): "PT", "INR", "APTT" in the last 168 hours.  Platelet Aggregation Study: No results for input(s): "PLTAGG", "PLTAGINTERP", "PLTAGREGLACO", "ADPPLTAGGREG" in the last 168 hours.  Hgb A1C: No results for input(s): "HGBA1C" in the last 168 hours.  TSH: No results for input(s): "TSH" in the last 168 hours.    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       MRI/MRA Brain w/o Contrast - 2/10/24  Impression:  Similar appearance of the brain when compared to the " prior study with no new lesion identified.  No midline shift herniation or intracranial hemorrhage.  The left temporal lobe lesion appears improved and less prominent than the prior study.  It is unclear whether these lesions represent embolic infarcts, or underlying infectious/inflammatory process including cerebritis/encephalitis or demyelinating disease.  No advanced major branch stenosis/occlusion at the [id number].  No evidence of venous sinus thrombosis.    CT Head w/o Contrast - 2/7/24  Impression:  Brain appears grossly unchanged from prior study performed earlier on the same date.  No evidence of new hemorrhage or major vascular distribution infarct.      CTA Head and Neck - 02/06/2024  Impression:  Several scattered punctate acute infarcts throughout the bilateral frontal lobes, centrum semiovale, basal ganglia, corpus callosum, and cerebellar hemispheres.  Overall distribution is concerning for embolic etiology (septic emboli to be considered in light of clinical history of infection).  Please note this is somewhat confounded by larger focal locular area of diffusion restriction within the left temporal lobe anteriorly which is prominently involving the subcortical white matter.  While this may be unusual possible venous additional area of acute infarction sequela of tumefactive demyelination a consideration with infectious process with encephalitis/abscess not excluded.  Clinical correlation and further evaluation with post-contrast MRI brain if patient compatible.       MRI Brain - 02/06/2024  Impression:  Several scattered punctate acute infarcts throughout the bilateral frontal lobes, centrum semiovale, basal ganglia, corpus callosum, and cerebellar hemispheres.  Overall distribution is concerning for embolic etiology (septic emboli to be considered in light of clinical history of infection).  Please note this is somewhat confounded by larger focal locular area of diffusion restriction within  the left temporal lobe anteriorly which is prominently involving the subcortical white matter.  While this may be unusual possible venous additional area of acute infarction sequela of tumefactive demyelination a consideration with infectious process with encephalitis/abscess not excluded.  Clinical correlation and further evaluation with post-contrast MRI brain if patient compatible.            Cardiac Imaging:  TTE 02/06/2024     Summary       Left Ventricle: The left ventricle is normal in size. Normal wall thickness. There is concentric remodeling. Normal wall motion. There is normal systolic function with a visually estimated ejection fraction of 60 - 65%. There is normal diastolic function. Normal left ventricular filling pressure.    Right Ventricle: Normal right ventricular cavity size. Wall thickness is normal. Right ventricle wall motion  is normal. Systolic function is normal.    Aorta: Aortic root is normal in size measuring 2.79 cm. Ascending aorta is normal measuring 2.57 cm.    IVC/SVC: Normal venous pressure at 3 mmHg.

## 2024-02-10 NOTE — PROGRESS NOTES
[facility]  Nephrology  Progress Note    Patient Name: Sarah Saravia  MRN: [id number]  Admission Date: 1/29/2024  Hospital Length of Stay: 12 days  Attending Provider: Steve Cole MD   Primary Care Physician: [facility]  Principal Problem:Ayaz's gangrene    Subjective:     HPI: Sarah Saravia is a 53 year old female with a past medical history of obesity (BMI 53) admitted with N/V and R groin wound found to be in DKA at OSH.  She underwent a CT abdomen and pelvis that showed extensive soft tissue air throughout the right groin and inguinal region and extending to involve the right lower quadrant anterior abdominal wall with extensive soft tissue air also seen involving the proximal medial aspect of the right thigh, concerning for gas-forming infection. She is s/p OR debridement for necrotizing fascitis on 1/29/24 and take back for 1/31/24. Right groin tissue growing proteus, susceptibilities still pending. Currently on meropenem and clindamycin which was d/c'd on 1/31/24. While at OSH pt developed acute respiratory failure requiring intubation. Nephrology was consulted for worsening alvarado in the setting of lactic acidosis and septic shock likely ATN, sCr elevated at 3.8 on admit.  OR today for debridement with possible closure and wound vac placement. Last HD 2/1. Net -1.3L. Electrolytes stable. Nephrology consulted for ALVARADO.     Interval History: Patient seen and examined this AM. Currently undergoing SBT. No acute events overnight. Abdominal closure tolerated well yesterday. Plans for MRI brain today in light of imagining from earlier this admission with evidence of strokes. Her last iHD session was yesterday.     Review of patient's allergies indicates:  No Known Allergies  Current Facility-Administered Medications   Medication Frequency    0.9%  NaCl infusion PRN    0.9%  NaCl infusion Once    albuterol-ipratropium 2.5 mg-0.5 mg/3 mL nebulizer solution 3  mL Q4H PRN    amLODIPine tablet 10 mg Daily    carvediloL tablet 12.5 mg BID    cefTRIAXone (ROCEPHIN) 2 g in dextrose 5 % in water (D5W) 100 mL IVPB (MB+) Q24H    dextrose 10 % infusion Continuous PRN    dextrose 10% bolus 125 mL 125 mL PRN    dextrose 10% bolus 250 mL 250 mL PRN    famotidine tablet 20 mg Daily    glucagon (human recombinant) injection 1 mg PRN    glucose chewable tablet 16 g PRN    glucose chewable tablet 24 g PRN    heparin (porcine) injection 7,500 Units Q8H    hydrALAZINE injection 10 mg Q4H PRN    insulin aspart U-100 pen 0-10 Units Q4H PRN    insulin aspart U-100 pen 6 Units Q4H    insulin regular in 0.9 % NaCl 100 unit/100 mL (1 unit/mL) infusion Continuous    labetalol 20 mg/4 mL (5 mg/mL) IV syring Q6H PRN    metronidazole IVPB 500 mg Q8H    naloxone 0.4 mg/mL injection 0.02 mg PRN    ondansetron injection 4 mg Q6H PRN    oxyCODONE 5 mg/5 mL solution 10 mg Q6H PRN    oxyCODONE 5 mg/5 mL solution 5 mg Q6H PRN    prochlorperazine injection Soln 5 mg Q6H PRN    psyllium husk (aspartame) 3.4 gram packet 1 packet BID    sevelamer carbonate pwpk 1.6 g TID WM    sodium chloride 0.9% bolus 250 mL 250 mL PRN    sodium chloride 0.9% bolus 250 mL 250 mL PRN    sodium chloride 0.9% flush 10 mL PRN       Objective:     Vital Signs (Most Recent):  Temp: 99.5 °F (37.5 °C) (02/10/24 0701)  Pulse: 88 (02/10/24 0900)  Resp: 17 (02/10/24 0900)  BP: (!) 145/65 (02/10/24 0900)  SpO2: 100 % (02/10/24 0900) Vital Signs (24h Range):  Temp:  [98.3 °F (36.8 °C)-99.5 °F (37.5 °C)] 99.5 °F (37.5 °C)  Pulse:  [85-98] 88  Resp:  [15-22] 17  SpO2:  [97 %-100 %] 100 %  BP: (139-197)/(65-88) 145/65  Arterial Line BP: (124-196)/(53-77) 126/56     Weight: (!) 153.3 kg (338 lb) (02/08/24 0300)  Body mass index is 56.25 kg/m².  Body surface area is 2.65 meters squared.    I/O last 3 completed shifts:  In: 3171.9 [I.V.:1455.2; Other:1000; NG/GT:110; IV Piggyback:606.7]  Out: 4305 [Urine:955; Other:3250; Stool:100]      Physical Exam  Vitals and nursing note reviewed.   Constitutional:       General: She is not in acute distress.     Appearance: She is obese. She is not ill-appearing or toxic-appearing.      Interventions: She is sedated and intubated.   Eyes:      General: No scleral icterus.        Right eye: No discharge.         Left eye: No discharge.   Cardiovascular:      Rate and Rhythm: Normal rate.   Pulmonary:      Effort: No respiratory distress. She is intubated.      Comments:       Abdominal:      General: There is distension.   Musculoskeletal:      Right lower leg: Edema present.      Left lower leg: Edema present.          Significant Labs:  ABGs:   Recent Labs   Lab 02/06/24  0747   PH 7.453*   PCO2 31.1*   HCO3 21.8*   POCSATURATED 99   BE -2     BMP:   Recent Labs   Lab 02/10/24  0333   *  179*   *  133*   K 4.3  4.3     104   CO2 17*  17*   BUN 41*  41*   CREATININE 3.5*  3.5*   CALCIUM 8.1*  8.1*   MG 1.9     CBC:   Recent Labs   Lab 02/10/24  0333   WBC 15.42*   RBC 2.60*   HGB 7.0*   HCT 21.4*      MCV 82   MCH 26.9*   MCHC 32.7     CMP:   Recent Labs   Lab 02/10/24  0333   *  179*   CALCIUM 8.1*  8.1*   ALBUMIN 2.1*  2.1*   PROT 7.0  7.0   *  133*   K 4.3  4.3   CO2 17*  17*     104   BUN 41*  41*   CREATININE 3.5*  3.5*   ALKPHOS 132  132   ALT 11  11   AST 27  27   BILITOT 0.3  0.3     LFTs:   Recent Labs   Lab 02/10/24  0333   ALT 11  11   AST 27  27   ALKPHOS 132  132   BILITOT 0.3  0.3   PROT 7.0  7.0   ALBUMIN 2.1*  2.1*     Microbiology Results (last 7 days)       Procedure Component Value Units Date/Time    Culture, Anaerobe [[id number]]  (Abnormal) Collected: 01/30/24 0228    Order Status: Completed Specimen: Wound from Groin Updated: 02/07/24 0748     Anaerobic Culture No anaerobes isolated      BACTEROIDES SPECIES  Few      Narrative:      Right groin tissue    Culture, Anaerobe [[id number]] Collected: 01/30/24 0219     Order Status: Completed Specimen: Abscess from Groin Updated: 02/07/24 0748     Anaerobic Culture No anaerobes isolated    Narrative:      Right groin abcess          Specimen (24h ago, onward)      None          Significant Imaging:  I have reviewed all imagining in the last 24 hours.  Assessment/Plan:     Renal/  * Ayaz's gangrene  Severe sepsis  - management per primary     ALVARADO (acute kidney injury)  Transfer from [facility]. Admitted for fourniers gangrene. Initiated HD on 1/31. ALVARADO 2/2 ATN in setting of septic shock. Arrived with CR 3.8. Unknown baseline.  Plan to OR today. Net -1.3L.OR today for debridement with possible closure and wound vac placement     ALVARADO most likley ATN in setting of septic shock     Plan/Recommendation  -Plan to hold on RRT while awaiting MRI findings, trial of Lasix 200 mg IV and Diuril 500 mg IV today and assess response as minimal with Lasix 120 mg IV  -Daily RFP   -Strict I&Os  -Avoid nephrotoxins, if possible     Thank you for your consult. I will follow-up with patient. Please contact us if you have any additional questions.    Wilian Cat MD  Nephrology  [facility]

## 2024-02-10 NOTE — PROGRESS NOTES
[facility]  General Surgery  Progress Note    Subjective:     History of Present Illness:  53 year old morbidly obese female who does not routinely go to the doctor presents to the ED with malaise, nausea, vomiting, and groin, buttock, perineal swelling and tenderness. She began feeling ill a few days ago.     On arrival to the ED she is tachycardic to 120, hypertensive without fever. Labs demonstrate leukocytosis of 21, , with lactic acidosis and associated ALVARADO with cr 3.8, hyperglycemia with blood glucose of 824 accompanied by severe electrolyte derangements. CT imaging obtained demonstrates diffuse subcutaneous air along buttocks, perineum, anterior vulva, as well as bilateral thighs.     No prior abdominal surgeries. She denies problems with her heart or lungs. Non smoker. Does not routinely take any medications.    Post-Op Info:  Procedure(s) (LRB):  DEBRIDEMENT, WOUND w wound vac change (Right)   1 Day Post-Op     Interval History: No acute events overnight. JAY yesterday unrevealing. Wound vac changed with partial wound closure in the OR yesterday, patient tolerated well. No changes to neuro exam.    Medications:  Continuous Infusions:   dextrose 10 % in water (D10W)      insulin regular 1 units/mL infusion orderable (TRANSFER) 0.7 Units/hr (02/10/24 0700)     Scheduled Meds:   sodium chloride 0.9%   Intravenous Once    amLODIPine  10 mg Per OG tube Daily    carvediloL  12.5 mg Per OG tube BID    cefTRIAXone (Rocephin) IV (PEDS and ADULTS)  2 g Intravenous Q24H    famotidine  20 mg Per OG tube Daily    heparin (porcine)  7,500 Units Subcutaneous Q8H    insulin aspart U-100  6 Units Subcutaneous Q4H    metronidazole  500 mg Intravenous Q8H    psyllium husk (aspartame)  1 packet Per OG tube BID    sevelamer carbonate  1.6 g Per OG tube TID WM     PRN Meds:sodium chloride 0.9%, albuterol-ipratropium, dextrose 10 % in water (D10W), dextrose 10%, dextrose 10%, glucagon (human  recombinant), glucose, glucose, hydrALAZINE, insulin aspart U-100, labetalol, naloxone, ondansetron, oxyCODONE, oxyCODONE, prochlorperazine, sodium chloride 0.9%, sodium chloride 0.9%, sodium chloride 0.9%     Review of patient's allergies indicates:  No Known Allergies  Objective:     Vital Signs (Most Recent):  Temp: 99.5 °F (37.5 °C) (02/10/24 0701)  Pulse: 93 (02/10/24 0739)  Resp: 20 (02/10/24 0739)  BP: (!) 161/71 (02/10/24 0300)  SpO2: 100 % (02/10/24 0739) Vital Signs (24h Range):  Temp:  [98.3 °F (36.8 °C)-99.5 °F (37.5 °C)] 99.5 °F (37.5 °C)  Pulse:  [85-98] 93  Resp:  [15-22] 20  SpO2:  [97 %-100 %] 100 %  BP: (139-197)/(65-88) 161/71  Arterial Line BP: (124-196)/(53-77) 139/54     Weight: (!) 153.3 kg (338 lb)  Body mass index is 56.25 kg/m².    Intake/Output - Last 3 Shifts         02/08 0700  02/09 0659 02/09 0700  02/10 0659 02/10 0700  02/11 0659    I.V. (mL/kg) 885.2 (5.8) 952.3 (6.2) 0.7 (0)    Other  1000     NG/GT 60 30 20    IV Piggyback 396.3 495.4     Total Intake(mL/kg) 1341.6 (8.8) 2477.6 (16.2) 20.7 (0.1)    Urine (mL/kg/hr) 660 (0.2) 615 (0.2) 30 (0.1)    Other 300 3150 0    Stool  100     Total Output 960 3865 30    Net +381.6 -1387.4 -9.3                    Physical Exam  Cardiovascular:      Rate and Rhythm: Normal rate.   Pulmonary:      Comments: Intubated  Vent Mode: Spont  Oxygen Concentration (%):  [40] 40  Resp Rate Total:  [15 br/min-21 br/min] 21 br/min  PEEP/CPAP:  [5 cmH20] 5 cmH20  Pressure Support:  [10 cmH20] 10 cmH20  Mean Airway Pressure:  [8.4 cmH20-9.2 cmH20] 8.8 cmH20      Abdominal:      General: There is no distension.      Palpations: Abdomen is soft.      Tenderness: There is no abdominal tenderness.   Skin:     General: Skin is warm and dry.      Comments: Wound vac in place to R thigh/groin to adequate suction          Significant Labs:  I have reviewed all pertinent lab results within the past 24 hours.  CBC:   Recent Labs   Lab 02/10/24  0333   WBC 15.42*   RBC  2.60*   HGB 7.0*   HCT 21.4*      MCV 82   MCH 26.9*   MCHC 32.7     BMP:   Recent Labs   Lab 02/10/24  0333   *  179*   *  133*   K 4.3  4.3     104   CO2 17*  17*   BUN 41*  41*   CREATININE 3.5*  3.5*   CALCIUM 8.1*  8.1*   MG 1.9       Significant Diagnostics:  I have reviewed all pertinent imaging results/findings within the past 24 hours.  Assessment/Plan:     * Ayaz's gangrene  53 y.o. 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     - Last W[place] change 2/9. Plan to go back to the OR Mon or Tues for wound vac change, will obtain consent  - starting to discuss need for trach/PEG/permacath/diverting colostomy given that the family would like everything done; may plan for this late next week pending patient's clinical status and ongoing discussions with family regarding poor prognosis  - Palliative following given overall poor prognosis, daughter would like everything done   - Daily SBTs  - Okay for DVT ppx   - Remainder of care per SICU        Beatris Parks MD  General Surgery  [facility]

## 2024-02-10 NOTE — PLAN OF CARE
"Patient's chart was reviewed by a stroke team provider and discussed with staff.     Briefly, 54 y/o admitted with necrotizing fascitis s/p surgical debridement who developed encephalopathy following her procedure. MRI brain showed multifocal areas of restricted diffusion in both hemispheres involving multiple vascular territories with additional larger left anterior temporal focus w/ vasogenic edema.     Differential includes septic embolic vs inflammatory vs hypercoagulopathy     TTE showed no vegetations. However, with a persistent leukocytosis, IE remains a concern. Recommended JAY and potentially an MRI of the brain W/ contrast (to evaluate for venous thrombosis). Per critical care provider note, "Cardiology currently not recommending JAY as team does not feel it will change prognosis". Unable to get MRI Brain with contrast due to renal failure and hospital policy. Recommend EEG if still encephalopathic at this time.      Stroke team will continue to follow on consulting service. Please do not hesitate to contact the stroke team for any questions or concerns.   "

## 2024-02-10 NOTE — ASSESSMENT & PLAN NOTE
Transfer from [facility]. Admitted for fourniers gangrene. Initiated HD on 1/31. ALVARADO 2/2 ATN in setting of septic shock. Arrived with CR 3.8. Unknown baseline.  Plan to OR today. Net -1.3L.OR today for debridement with possible closure and wound vac placement     ALVARADO most likley ATN in setting of septic shock     Plan/Recommendation  -Plan to hold on RRT while awaiting MRI findings, trial of Lasix 200 mg IV and Diuril 500 mg IV today and assess response as minimal with Lasix 120 mg IV  -Daily RFP   -Strict I&Os  -Avoid nephrotoxins, if possible

## 2024-02-10 NOTE — SUBJECTIVE & OBJECTIVE
Interval History: Patient seen and examined this AM. Currently undergoing SBT. No acute events overnight. Abdominal closure tolerated well yesterday. Plans for MRI brain today in light of imagining from earlier this admission with evidence of strokes. Her last iHD session was yesterday.     Review of patient's allergies indicates:  No Known Allergies  Current Facility-Administered Medications   Medication Frequency    0.9%  NaCl infusion PRN    0.9%  NaCl infusion Once    albuterol-ipratropium 2.5 mg-0.5 mg/3 mL nebulizer solution 3 mL Q4H PRN    amLODIPine tablet 10 mg Daily    carvediloL tablet 12.5 mg BID    cefTRIAXone (ROCEPHIN) 2 g in dextrose 5 % in water (D5W) 100 mL IVPB (MB+) Q24H    dextrose 10 % infusion Continuous PRN    dextrose 10% bolus 125 mL 125 mL PRN    dextrose 10% bolus 250 mL 250 mL PRN    famotidine tablet 20 mg Daily    glucagon (human recombinant) injection 1 mg PRN    glucose chewable tablet 16 g PRN    glucose chewable tablet 24 g PRN    heparin (porcine) injection 7,500 Units Q8H    hydrALAZINE injection 10 mg Q4H PRN    insulin aspart U-100 pen 0-10 Units Q4H PRN    insulin aspart U-100 pen 6 Units Q4H    insulin regular in 0.9 % NaCl 100 unit/100 mL (1 unit/mL) infusion Continuous    labetalol 20 mg/4 mL (5 mg/mL) IV syring Q6H PRN    metronidazole IVPB 500 mg Q8H    naloxone 0.4 mg/mL injection 0.02 mg PRN    ondansetron injection 4 mg Q6H PRN    oxyCODONE 5 mg/5 mL solution 10 mg Q6H PRN    oxyCODONE 5 mg/5 mL solution 5 mg Q6H PRN    prochlorperazine injection Soln 5 mg Q6H PRN    psyllium husk (aspartame) 3.4 gram packet 1 packet BID    sevelamer carbonate pwpk 1.6 g TID WM    sodium chloride 0.9% bolus 250 mL 250 mL PRN    sodium chloride 0.9% bolus 250 mL 250 mL PRN    sodium chloride 0.9% flush 10 mL PRN       Objective:     Vital Signs (Most Recent):  Temp: 99.5 °F (37.5 °C) (02/10/24 0701)  Pulse: 88 (02/10/24 0900)  Resp: 17 (02/10/24 0900)  BP: (!) 145/65 (02/10/24  0900)  SpO2: 100 % (02/10/24 0900) Vital Signs (24h Range):  Temp:  [98.3 °F (36.8 °C)-99.5 °F (37.5 °C)] 99.5 °F (37.5 °C)  Pulse:  [85-98] 88  Resp:  [15-22] 17  SpO2:  [97 %-100 %] 100 %  BP: (139-197)/(65-88) 145/65  Arterial Line BP: (124-196)/(53-77) 126/56     Weight: (!) 153.3 kg (338 lb) (02/08/24 0300)  Body mass index is 56.25 kg/m².  Body surface area is 2.65 meters squared.    I/O last 3 completed shifts:  In: 3171.9 [I.V.:1455.2; Other:1000; NG/GT:110; IV Piggyback:606.7]  Out: 4305 [Urine:955; Other:3250; Stool:100]     Physical Exam  Vitals and nursing note reviewed.   Constitutional:       General: She is not in acute distress.     Appearance: She is obese. She is not ill-appearing or toxic-appearing.      Interventions: She is sedated and intubated.   Eyes:      General: No scleral icterus.        Right eye: No discharge.         Left eye: No discharge.   Cardiovascular:      Rate and Rhythm: Normal rate.   Pulmonary:      Effort: No respiratory distress. She is intubated.      Comments:       Abdominal:      General: There is distension.   Musculoskeletal:      Right lower leg: Edema present.      Left lower leg: Edema present.          Significant Labs:  ABGs:   Recent Labs   Lab 02/06/24  0747   PH 7.453*   PCO2 31.1*   HCO3 21.8*   POCSATURATED 99   BE -2     BMP:   Recent Labs   Lab 02/10/24  0333   *  179*   *  133*   K 4.3  4.3     104   CO2 17*  17*   BUN 41*  41*   CREATININE 3.5*  3.5*   CALCIUM 8.1*  8.1*   MG 1.9     CBC:   Recent Labs   Lab 02/10/24  0333   WBC 15.42*   RBC 2.60*   HGB 7.0*   HCT 21.4*      MCV 82   MCH 26.9*   MCHC 32.7     CMP:   Recent Labs   Lab 02/10/24  0333   *  179*   CALCIUM 8.1*  8.1*   ALBUMIN 2.1*  2.1*   PROT 7.0  7.0   *  133*   K 4.3  4.3   CO2 17*  17*     104   BUN 41*  41*   CREATININE 3.5*  3.5*   ALKPHOS 132  132   ALT 11  11   AST 27  27   BILITOT 0.3  0.3     LFTs:   Recent Labs    Lab 02/10/24  0333   ALT 11  11   AST 27  27   ALKPHOS 132  132   BILITOT 0.3  0.3   PROT 7.0  7.0   ALBUMIN 2.1*  2.1*     Microbiology Results (last 7 days)       Procedure Component Value Units Date/Time    Culture, Anaerobe [[id number]]  (Abnormal) Collected: 01/30/24 0228    Order Status: Completed Specimen: Wound from Groin Updated: 02/07/24 0748     Anaerobic Culture No anaerobes isolated      BACTEROIDES SPECIES  Few      Narrative:      Right groin tissue    Culture, Anaerobe [[id number]] Collected: 01/30/24 0219    Order Status: Completed Specimen: Abscess from Groin Updated: 02/07/24 0748     Anaerobic Culture No anaerobes isolated    Narrative:      Right groin abcess          Specimen (24h ago, onward)      None          Significant Imaging:  I have reviewed all imagining in the last 24 hours.

## 2024-02-10 NOTE — SUBJECTIVE & OBJECTIVE
Interval History/Significant Events: Pt seen and examined at bedside. BISHNU MULLER.       Follow-up For: Procedure(s) (LRB):  DEBRIDEMENT, WOUND w wound vac change (Right)    Post-Operative Day: 1 Day Post-Op    Objective:     Vital Signs (Most Recent):  Temp: 98.3 °F (36.8 °C) (02/09/24 1900)  Pulse: 96 (02/10/24 0431)  Resp: (!) 22 (02/10/24 0431)  BP: (!) 161/71 (02/10/24 0300)  SpO2: 100 % (02/10/24 0431) Vital Signs (24h Range):  Temp:  [98.3 °F (36.8 °C)-98.6 °F (37 °C)] 98.3 °F (36.8 °C)  Pulse:  [85-98] 96  Resp:  [15-22] 22  SpO2:  [97 %-100 %] 100 %  BP: (139-197)/(64-88) 161/71  Arterial Line BP: (124-196)/(53-77) 139/54     Weight: (!) 153.3 kg (338 lb)  Body mass index is 56.25 kg/m².      Intake/Output Summary (Last 24 hours) at 2/10/2024 0532  Last data filed at 2/10/2024 0100  Gross per 24 hour   Intake 2217.35 ml   Output 3585 ml   Net -1367.65 ml          Physical Exam     Vitals reviewed.   Constitutional:       Appearance: She is obese.   HENT:      Head: Normocephalic.      Mouth/Throat:      Mouth: Mucous membranes are moist.   Eyes:      Comments: Bilateral R gaze   Neck:      Comments: RIJ Trialysis  Cardiovascular:      Rate and Rhythm: Normal rate and regular rhythm.   Pulmonary:      Effort: Pulmonary effort is normal.      Comments: Intubated  Abdominal:      General: Abdomen is flat.      Palpations: Abdomen is soft.   Genitourinary:     Comments: R groin wound   Glynn in place  Rectal tube  Musculoskeletal:      Right lower leg: Edema present.      Left lower leg: Edema present.      Comments: R radial A-line   Skin:     General: Skin is warm.   Neurological:      Comments: Opens eyes to pain but does not withdraw           Lines/Drains/Airways       Central Venous Catheter Line  Duration             Trialysis (Dialysis) Catheter 02/06/24 1345 right internal jugular 3 days              Drain  Duration                  Rectal Tube 02/04/24 1545 5 days         NG/OG Tube 02/06/24 1030  Center mouth 3 days         Urethral Catheter 02/06/24 1900 3 days              Airway  Duration                  Airway - Non-Surgical 01/31/24 1020 9 days              Arterial Line  Duration             Arterial Line 01/31/24 1025 Right Radial 9 days              Peripheral Intravenous Line  Duration                  Peripheral IV - Single Lumen 02/04/24 2241 20 G Right Antecubital 5 days         Peripheral IV - Single Lumen 02/08/24 0152 18 G Anterior;Left Forearm 2 days         Peripheral IV - Single Lumen 02/09/24 0130 18 G Anterior;Right Forearm 1 day                    Significant Labs:    CBC/Anemia Profile:  Recent Labs   Lab 02/09/24  0335 02/10/24  0333   WBC 16.02*  16.02* 15.42*   HGB 7.1*  7.1* 7.0*   HCT 22.7*  22.7* 21.4*     387 387   MCV 85  85 82   RDW 17.6*  17.6* 18.1*        Chemistries:  Recent Labs   Lab 02/09/24  0335 02/09/24  1312 02/10/24  0333   * 133* 133*  133*   K 4.1 4.2 4.3  4.3    106 104  104   CO2 16* 19* 17*  17*   BUN 59* 37* 41*  41*   CREATININE 4.4* 3.3* 3.5*  3.5*   CALCIUM 8.0* 7.9* 8.1*  8.1*   ALBUMIN 1.8* 1.7* 2.1*  2.1*   PROT 7.1  --  7.0  7.0   BILITOT 0.2  --  0.3  0.3   ALKPHOS 129  --  132  132   ALT 10  --  11  11   AST 31  --  27  27   MG 2.1  --  1.9   PHOS 8.6* 6.5* 7.7*         Significant Imaging:  I have reviewed all pertinent imaging results/findings within the past 24 hours.

## 2024-02-10 NOTE — ASSESSMENT & PLAN NOTE
Multiple acute embolic strokes noted on MRI. TTE without evidence of infective endocarditis. Blood cultures no growth. Underwent operative JAY without any reported abnormalities.   Unclear from ID perspective that emboli are septic in nature as patient was not bacteremic and JAY reportedly without evidence of abnormalities.   Will defer further management to Vascular Neurology.  If other studies suggestive of septic embolization then please reconsult.

## 2024-02-10 NOTE — PLAN OF CARE
"      SICU PLAN OF CARE NOTE    Dx: Ayaz's gangrene    Shift Events: Patient went for MRI today. Hydralazine given x1 to maintain BP.     Goals of Care: MAP > 65, SBP < 170    Neuro: Arouses to Voice    Vital Signs: BP (!) 164/70 (BP Location: Left arm, Patient Position: Lying)   Pulse 93   Temp 98.5 °F (36.9 °C) (Oral)   Resp 18   Ht 5' 5" (1.651 m)   Wt (!) 153.3 kg (338 lb)   LMP 01/01/2024 (Approximate) Comment: tubal ligation  SpO2 98%   BMI 56.25 kg/m²     Respiratory: Ventilator  Spon   Fi02 40%  Peep 5    Diet: TPN @ 30   Goal 30    Gtts: Insulin     Urine Output: Urinary Catheter 170 cc/shift    Drains:     Wound vac 150 cc / shift    BMS 55 cc / shift        Labs/Accuchecks: Daily labs, Q4hrs accucheck.    Skin: Speciality bed plugged in. Patient turned q2hrs. No evidence of new skin breakdown throughout shift.       "

## 2024-02-10 NOTE — PROGRESS NOTES
[facility]  Infectious Disease  Progress Note    Patient Name: Sarah Saravia  MRN: [id number]  Admission Date: 1/29/2024  Length of Stay: 12 days  Attending Physician: Steve Cole MD  [facility]    Isolation Status: No active isolations  Assessment/Plan:      Neuro  Ac isch multi vasc territories stroke  Multiple acute embolic strokes noted on MRI. TTE without evidence of infective endocarditis. Blood cultures no growth. Underwent operative JAY without any reported abnormalities.   Unclear from ID perspective that emboli are septic in nature as patient was not bacteremic and JAY reportedly without evidence of abnormalities.   Will defer further management to Vascular Neurology.  If other studies suggestive of septic embolization then please reconsult.    Renal/  * Ayaz's gangrene  I have reviewed hospital notes from  SICU service and other specialty providers. I have also reviewed CBC, CMP/BMP,  cultures and imaging with my interpretation as documented.     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 Operative cultures showing P mirabilis. S/P wound VAC exchange on 2/6. S/p wound VAC exchange and debridement on 2/9. Afebrile and with down trend in leukocytosis.  Continue ceftriaxone 2 gm IV daily.  Continue metronidazole.   Recommend at least 2 weeks of therapy from 2/9. Anticipated end date: 2/22/24.  Monitor CBC and CMP weekly while on antibiotics.  Re-consult closer to antibiotic end date for further antibiotic recommendations if patient remains stable.  Surgical debridements as indicated for non viable tissue as per Surgical Service.  Discussed management plan with the staff and/or members from SICU and General Surgery service.           Anticipated Disposition: per primary    Thank you for your consult. I will sign off. Please contact us if you have any additional questions.    Devika Andujar MD  Infectious  Disease  [facility]    50 minutes of total time spent on the encounter, which includes face to face time and non-face to face time preparing to see the patient (eg, review of tests), obtaining and/or reviewing separately obtained history, documenting clinical information in the electronic or other health record, independently interpreting results (not separately reported) and communicating results to the patient/family/caregiver, or care coordination (not separately reported).     Subjective:     Principal Problem:Ayaz's gangrene    HPI: A 53-year-old woman with morbid obesity as evidenced by a BMI of 50 3 (documented as 58 at [facility]) who originally presented to [facility] with a wound to her posterior thigh, vomiting, and diarrhea for 3-4 days prior to admission.  On evaluation in [facility] ED she was noted to be afebrile tachycardic, and hypertensive.  Laboratory workup at that time revealed leukocytosis, elevated creatinine, elevated lactic acid, and elevated CRP.  Additionally it showed hyperglycemia with anion gap acidosis consistent with diabetic ketoacidosis.  CT imaging of the abdomen showed extensive soft tissue air throughout the right groin and inguinal region extending to the right lower quadrant of the anterior abdominal wall and medial aspect of the right thigh concerning for gas-forming infection.  She was admitted to hospital medicine service and taken to the OR by General surgery for debridement.  Empiric antibiotics with Zosyn, clindamycin, and vancomycin was started.  She underwent incision and drainage with debridement of the soft tissues down to the muscular fascia on 01/29.  She was then subsequently taken back to the OR on 01/31 where she underwent irrigation and debridement of the wound and insertion of a triple-lumen temporary hemodialysis catheter.  She was subsequently transitioned to meropenem and clindamycin while in [facility].  Unfortunately  her hospital course was complicated by acute respiratory failure requiring intubation with mechanical ventilation and worsening ALVARADO prompting initiation of renal replacement therapy.  She was transferred to [facility] for higher level of care.  Upon transfer the patient's antibiotic therapy was deescalated to ceftriaxone.    Infectious disease is consulted for Antibiotic management: currently on merem, concern for nec fasciitis          Interval History: ".  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 Taken to OR on 2/2 for debridement of the right buttocks and groin (#3). Evaluated by Neurology on 2/3 for encephalopathy. CT head with remote infarcts. Not withdrawing to pain on the morning of 2/5.    MRI done on 2/6 with embolic infarcts. S/P wound VAC exchange on 2/6. MRI brain revealed multiple embolic infarcts. Wound VAC exchange with debridement of fibrinous tissue, wound approximation and wound VAC placement on 2/9. Intraoperative JAY on 2/9 without cardiac abnormalities per EMR review.    Review of Systems   Unable to perform ROS: Intubated     Objective:     Vital Signs (Most Recent):  Temp: 99.5 °F (37.5 °C) (02/10/24 0701)  Pulse: 88 (02/10/24 0900)  Resp: 17 (02/10/24 0900)  BP: (!) 145/65 (02/10/24 0900)  SpO2: 100 % (02/10/24 0900) Vital Signs (24h Range):  Temp:  [98.3 °F (36.8 °C)-99.5 °F (37.5 °C)] 99.5 °F (37.5 °C)  Pulse:  [85-98] 88  Resp:  [15-22] 17  SpO2:  [97 %-100 %] 100 %  BP: (139-197)/(65-88) 145/65  Arterial Line BP: (124-196)/(53-77) 126/56     Weight: (!) 153.3 kg (338 lb)  Body mass index is 56.25 kg/m².    Estimated Creatinine Clearance: 28 mL/min (A) (based on SCr of 3.5 mg/dL (H)).     Physical Exam  Vitals and nursing note reviewed.   Constitutional:       Appearance: She is well-developed. She is ill-appearing.      Interventions: She is intubated.   HENT:      Head: Normocephalic and atraumatic.      Right Ear: External ear normal.       Left Ear: External ear normal.   Eyes:      General: No scleral icterus.        Right eye: No discharge.         Left eye: No discharge.      Conjunctiva/sclera: Conjunctivae normal.   Cardiovascular:      Rate and Rhythm: Normal rate and regular rhythm.      Heart sounds: Normal heart sounds. No murmur heard.     No friction rub. No gallop.   Pulmonary:      Effort: Pulmonary effort is normal. No respiratory distress. She is intubated.      Breath sounds: No stridor. No wheezing, rhonchi or rales.   Abdominal:      General: Bowel sounds are normal.   Musculoskeletal:         General: No tenderness.   Skin:     General: Skin is warm and dry.      Comments: Wound VAC on right groin area   Neurological:      Mental Status: She is lethargic.      Comments: Opens left eye to verbal stimuli however non purposeful. Does not track.          Significant Labs: BMP:   Recent Labs   Lab 02/10/24  0333   *  179*   *  133*   K 4.3  4.3     104   CO2 17*  17*   BUN 41*  41*   CREATININE 3.5*  3.5*   CALCIUM 8.1*  8.1*   MG 1.9       CBC:   Recent Labs   Lab 02/09/24  0335 02/10/24  0333   WBC 16.02*  16.02* 15.42*   HGB 7.1*  7.1* 7.0*   HCT 22.7*  22.7* 21.4*     387 387       Microbiology Results (last 7 days)       Procedure Component Value Units Date/Time    Culture, Anaerobe [[id number]]  (Abnormal) Collected: 01/30/24 0228    Order Status: Completed Specimen: Wound from Groin Updated: 02/07/24 0748     Anaerobic Culture No anaerobes isolated      BACTEROIDES SPECIES  Few      Narrative:      Right groin tissue    Culture, Anaerobe [[id number]] Collected: 01/30/24 0219    Order Status: Completed Specimen: Abscess from Groin Updated: 02/07/24 0748     Anaerobic Culture No anaerobes isolated    Narrative:      Right groin abcess            Significant Imaging: I have reviewed all pertinent imaging results/findings within the past 24 hours.

## 2024-02-10 NOTE — ASSESSMENT & PLAN NOTE
I have reviewed hospital notes from  SICU service and other specialty providers. I have also reviewed CBC, CMP/BMP,  cultures and imaging with my interpretation as documented.     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 Operative cultures showing P mirabilis. S/P wound VAC exchange on 2/6. S/p wound VAC exchange and debridement on 2/9. Afebrile and with down trend in leukocytosis.  Continue ceftriaxone 2 gm IV daily.  Continue metronidazole.   Recommend at least 2 weeks of therapy from 2/9. Anticipated end date: 2/22/24.  Monitor CBC and CMP weekly while on antibiotics.  Re-consult closer to antibiotic end date for further antibiotic recommendations if patient remains stable.  Surgical debridements as indicated for non viable tissue as per Surgical Service.  Discussed management plan with the staff and/or members from SICU and General Surgery service.

## 2024-02-10 NOTE — ASSESSMENT & PLAN NOTE
Neuro/Psych:   #Acute Encephalopathy  CTH 2/3 with scattered hypoattenuation likely representing age indeterminate infarcts. EEG findings consistent with moderate-severe encephalopathy. MRI 2/6: Several scattered punctate acute infarcts throughout the bilateral frontal lobes, centrum semiovale, basal ganglia, corpus callosum, and cerebellar hemispheres.  CTA 2/6: Atherosclerotic plaquing of the carotid bifurcations and proximal ICAs with less than 50% proximal ICA stenosis by NASCET criteria . 2/7 CTH unchanged from prior CT. Ddx includes septic embolic vs inflammatory vs hypercoagulopathy.     -- Sedation: None  -- Pain: kermit tylenol, dialudid and oxy prn  -- GCS 7T: E2, V1T, M4; exam stable  -- Neurology following; appreciate recs  -- Neurovascular following; appreciate recs  -- Palliative following; GOC conversation 2/7; appreciate recs  -- Cardiology currently not recommending JAY as team does not feel it will change prognosis. Will continue to obtain JAY with cardiac anesthesia             Cards:   TTE 2/6 unremarkable.  -- HDS  -- goal SBP < 180, MAP >65  -- hypertensive, hydralazine and labetalol prns  -- Continue amlodipine 10mg daily;  coreg 6.25mg BID      Pulm:   #Acute Respiratory Failure  -- Intubated on spontaneous , Pressure support.  -- Goal O2 sat > 90%      Renal:  #ALVARADO on CKD  #ATN  Received HD 2/9  -- Nephrology following; appreciate recs  -- RRT as needed  -- Transition to Lasix 120mg challenge after HD  Recent Labs   Lab 02/09/24  0335 02/09/24  1312 02/10/24  0333   BUN 59* 37* 41*  41*   CREATININE 4.4* 3.3* 3.5*  3.5*        FEN / GI:   -- Daily CMPs  -- NGT in place   -- Replace lytes as needed  -- Nutrition: NPO, TF (Novasource Renal) at goal 30 ml/hr   -- GI PPX   -- Nutrition following; appreciate recs  -- Continue psyllium      ID:    #Ayaz's gangrene  s/p OR debridement for nec fascitis. R groin tissue with proteus, sensitive to ceftriaxone. Growing bacteroids as well.  Debridement and wound vac change 2/7  -- Abx: ceftriaxone and flagyl  -- ID following ; appreciate recs    Recent Labs   Lab 02/08/24  0302 02/09/24 0335 02/10/24  0333   WBC 20.99* 16.02*  16.02* 15.42*        Heme/Onc:  -- Daily CBC  -- Heparin DVT ppx    Recent Labs   Lab 02/08/24  0302 02/09/24 0335 02/10/24  0333   HGB 7.3* 7.1*  7.1* 7.0*    387  387 387        Endo:   #IDDM  Initially presented to OSH with DKA with latest A1C 10.4 AG Gap resolved  - Placed on insulin gtt 2/3: Transition IV insulin infusion at 1.8 u/hr with stepdown parameters   - q4h BG monitoring while NPO  - Resume Novolog to 10 units units q 4 hrs when tube feeds restarted   - Moderate dose SSI PRN  - Endocrine following, appreciate recs        PPx:   Feeding: NPO  Analgesia/Sedation: See above  Thromboembolic prevention: [facility]   HOB >30: Y  Stress Ulcer ppx: Famotidine  Glucose control: Critical care goal 140-180 g/dl, Insulin gtt, kermit novolog, SSI, Endo following  Bowel reg: psyllium  Invasive Lines/Drains/Airway: Glynn, ETT, Art line, Trialysis, PIV x2, Rectal tube      Dispo/Code Status/Palliative:   -- SICU / Full Code   -- JAY for neuroprognosis/stroke

## 2024-02-10 NOTE — ASSESSMENT & PLAN NOTE
Lab Results   Component Value Date    CREATININE 3.5 (H) 02/10/2024    CREATININE 3.5 (H) 02/10/2024     Avoid insulin stacking  Titrate insulin slowly

## 2024-02-10 NOTE — ANESTHESIA POSTPROCEDURE EVALUATION
Anesthesia Post Evaluation    Patient: Sarah Saravia    Procedure(s) Performed: Procedure(s) (LRB):  DEBRIDEMENT, WOUND w wound vac change (Right)    Final Anesthesia Type: general      Patient location during evaluation: ICU  Patient participation: No - Unable to Participate, Intubation  Level of consciousness: obtunded/minimal responses  Post-procedure vital signs: reviewed and stable  Pain management: adequate  Airway patency: patent      Anesthetic complications: no      Cardiovascular status: hemodynamically stable  Respiratory status: ETT  Follow-up not needed.              Vitals Value Taken Time   /70 02/10/24 0702   Temp 36.8 °C (98.3 °F) 02/09/24 1900   Pulse 93 02/10/24 0709   Resp 21 02/10/24 0709   SpO2 100 % 02/10/24 0709   Vitals shown include unvalidated device data.      No case tracking events are documented in the log.      Pain/Lucía Score: Pain Rating Prior to Med Admin: 2 (2/10/2024  5:30 AM)  Pain Rating Post Med Admin: 2 (2/10/2024  6:30 AM)

## 2024-02-10 NOTE — ASSESSMENT & PLAN NOTE
53 y.o. 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     - Last WV change 2/9. Plan to go back to the OR Mon or Tues for wound vac change, will obtain consent  - starting to discuss need for trach/PEG/permacath/diverting colostomy given that the family would like everything done; may plan for this late next week pending patient's clinical status and ongoing discussions with family regarding poor prognosis  - Palliative following given overall poor prognosis, daughter would like everything done   - Daily SBTs  - Okay for DVT ppx   - Remainder of care per SICU

## 2024-02-10 NOTE — ASSESSMENT & PLAN NOTE
Followed by Nephrology, recommendations include:  -Plan to hold on RRT while awaiting MRI findings, trial of Lasix 200 mg IV and Diuril 500 mg IV today and assess response as minimal with Lasix 120 mg IV  -Daily RFP   -Strict I&Os  -Avoid nephrotoxins, if possible

## 2024-02-10 NOTE — PROGRESS NOTES
"[facility]  Endocrinology  Progress Note    Admit Date: 1/29/2024     Reason for Consult: Management of T2DM, Hyperglycemia     Surgical Procedure and Date: n/a    Diabetes diagnosis year: new onset     Home Diabetes Medications:  none per chart review and family present at bedside     Lab Results   Component Value Date    HGBA1C 10.4 (H) 01/30/2024       Diabetes Complications include:     Hyperglycemia, DKA at OSH     Complicating diabetes co morbidities:   Obesity       HPI:   Patient is a 53 y.o. female with a diagnosis of obesity (BMI 53) admitted with N/V and R groin wound found to be in DKA at OSH. 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 While at OSH pt developed acute respiratory failure requiring intubation. Pulmonology was consulted for ventilator management and critical illness. Nephrology was consulted for worsening vishal in the setting of lactic acidosis and septic shock likely ATN, sCr elevated at 3.8 on admit now 4.0 post HD. Pt being admitted to SICU for surgical critical care management. Immediate plans include hemodynamic stabilization, weaning of respiratory support, and RRT.  Endocrinology consulted for management of T2DM.                 Interval HPI:   No acute events overnight. Patient in room 27244/38624 A. Blood glucose stable. BG at goal on current insulin regimen (Transition Insulin Drip). Steroid use- None .   1 Day Post-Op  Renal function- Abnormal -   Vasopressors-  None     Diet NPO     Eating:   NPO  Nausea: No  Hypoglycemia and intervention: No  Fever: No  TPN and/or TF: Yes    BP (!) 145/65 (BP Location: Left arm, Patient Position: Lying)   Pulse 88   Temp 99.5 °F (37.5 °C) (Oral)   Resp 17   Ht 5' 5" (1.651 m)   Wt (!) 153.3 kg (338 lb)   LMP 01/01/2024 (Approximate) Comment: tubal ligation  SpO2 100%   BMI 56.25 kg/m²     Labs Reviewed and Include  " "  Recent Labs   Lab 02/10/24  0333   *  179*   CALCIUM 8.1*  8.1*   ALBUMIN 2.1*  2.1*   PROT 7.0  7.0   *  133*   K 4.3  4.3   CO2 17*  17*     104   BUN 41*  41*   CREATININE 3.5*  3.5*   ALKPHOS 132  132   ALT 11  11   AST 27  27   BILITOT 0.3  0.3     Lab Results   Component Value Date    WBC 15.42 (H) 02/10/2024    HGB 7.0 (L) 02/10/2024    HCT 21.4 (L) 02/10/2024    MCV 82 02/10/2024     02/10/2024     No results for input(s): "TSH", "FREET4" in the last 168 hours.  Lab Results   Component Value Date    HGBA1C 10.4 (H) 01/30/2024       Nutritional status:   Body mass index is 56.25 kg/m².  Lab Results   Component Value Date    ALBUMIN 2.1 (L) 02/10/2024    ALBUMIN 2.1 (L) 02/10/2024    ALBUMIN 1.7 (L) 02/09/2024     No results found for: "PREALBUMIN"    Estimated Creatinine Clearance: 28 mL/min (A) (based on SCr of 3.5 mg/dL (H)).    Accu-Checks  Recent Labs     02/08/24 1952 02/08/24  2320 02/09/24  0334 02/09/24  0837 02/09/24  1311 02/09/24  1711 02/09/24  2117 02/10/24  0004 02/10/24  0333 02/10/24  0743   POCTGLUCOSE 191* 140* 113* 143* 148* 170* 201* 198* 178* 134*       Current Medications and/or Treatments Impacting Glycemic Control  Immunotherapy:    Immunosuppressants       None          Steroids:   Hormones (From admission, onward)      None          Pressors:    Autonomic Drugs (From admission, onward)      None          Hyperglycemia/Diabetes Medications:   Antihyperglycemics (From admission, onward)      Start     Stop Route Frequency Ordered    02/09/24 1200  insulin aspart U-100 pen 6 Units         -- SubQ Every 4 hours 02/09/24 0901    02/09/24 0915  insulin regular in 0.9 % NaCl 100 unit/100 mL (1 unit/mL) infusion        Question:  Enter initial dose (Units/hr):  Answer:  0.7    -- IV Continuous 02/09/24 0901    02/03/24 1010  insulin aspart U-100 pen 0-10 Units         -- SubQ Every 4 hours PRN 02/03/24 0911            ASSESSMENT and " PLAN    Renal/  * Ayaz's gangrene  Managed per primary team  Optimize BG control        ALVARADO (acute kidney injury)  Lab Results   Component Value Date    CREATININE 3.5 (H) 02/10/2024    CREATININE 3.5 (H) 02/10/2024     Avoid insulin stacking  Titrate insulin slowly       Endocrine  New onset type 2 diabetes mellitus  BG goal 140-180  No previous hx of T2DM per family at bedside. A1c of 10.4. DKA at OSH. TF on and off . Will decrease scheduled amount as restarted on trickle.    Plan:  Continue transition IV insulin infusion at 0.6 u/hr with stepdown parameters    Continue Novolog 6 units q 4 hrs while on tube feeding (HOLD if tube feeding is stopped or BG < 100)   -Continue Moderate Dose Correction Scale  -BG monitoring q 4 hrs while NPO     ** Please call Endocrine for any BG related issues **      Discharge plans: TBD    Lab Results   Component Value Date    HGBA1C 10.4 (H) 01/30/2024             Salvador Alfaro PA-C  Endocrinology  [facility]

## 2024-02-11 ENCOUNTER — ANESTHESIA EVENT (OUTPATIENT)
Dept: SURGERY | Facility: HOSPITAL | Age: 54
DRG: 004 | End: 2024-02-11
Payer: MEDICAID

## 2024-02-11 PROBLEM — E83.39 HYPERPHOSPHATEMIA: Status: ACTIVE | Noted: 2024-02-11

## 2024-02-11 LAB
ALBUMIN SERPL BCP-MCNC: 1.8 G/DL (ref 3.5–5.2)
ALBUMIN SERPL BCP-MCNC: 1.8 G/DL (ref 3.5–5.2)
ALBUMIN SERPL BCP-MCNC: 1.9 G/DL (ref 3.5–5.2)
ALP SERPL-CCNC: 114 U/L (ref 55–135)
ALT SERPL W/O P-5'-P-CCNC: 13 U/L (ref 10–44)
ANION GAP SERPL CALC-SCNC: 10 MMOL/L (ref 8–16)
ANION GAP SERPL CALC-SCNC: 8 MMOL/L (ref 8–16)
ANION GAP SERPL CALC-SCNC: 9 MMOL/L (ref 8–16)
AST SERPL-CCNC: 32 U/L (ref 10–40)
BASOPHILS # BLD AUTO: 0.04 K/UL (ref 0–0.2)
BASOPHILS # BLD AUTO: 0.04 K/UL (ref 0–0.2)
BASOPHILS NFR BLD: 0.3 % (ref 0–1.9)
BASOPHILS NFR BLD: 0.3 % (ref 0–1.9)
BILIRUB SERPL-MCNC: 0.2 MG/DL (ref 0.1–1)
BLD PROD TYP BPU: NORMAL
BLOOD UNIT EXPIRATION DATE: NORMAL
BLOOD UNIT TYPE CODE: 1700
BLOOD UNIT TYPE: NORMAL
BUN SERPL-MCNC: 36 MG/DL (ref 6–20)
BUN SERPL-MCNC: 48 MG/DL (ref 6–20)
BUN SERPL-MCNC: 51 MG/DL (ref 6–20)
CALCIUM SERPL-MCNC: 8 MG/DL (ref 8.7–10.5)
CALCIUM SERPL-MCNC: 8.2 MG/DL (ref 8.7–10.5)
CALCIUM SERPL-MCNC: 8.2 MG/DL (ref 8.7–10.5)
CHLORIDE SERPL-SCNC: 104 MMOL/L (ref 95–110)
CHLORIDE SERPL-SCNC: 105 MMOL/L (ref 95–110)
CHLORIDE SERPL-SCNC: 105 MMOL/L (ref 95–110)
CO2 SERPL-SCNC: 18 MMOL/L (ref 23–29)
CO2 SERPL-SCNC: 21 MMOL/L (ref 23–29)
CO2 SERPL-SCNC: 21 MMOL/L (ref 23–29)
CODING SYSTEM: NORMAL
CREAT SERPL-MCNC: 2.6 MG/DL (ref 0.5–1.4)
CREAT SERPL-MCNC: 3.1 MG/DL (ref 0.5–1.4)
CREAT SERPL-MCNC: 4 MG/DL (ref 0.5–1.4)
CROSSMATCH INTERPRETATION: NORMAL
DIFFERENTIAL METHOD BLD: ABNORMAL
DIFFERENTIAL METHOD BLD: ABNORMAL
DISPENSE STATUS: NORMAL
EOSINOPHIL # BLD AUTO: 0.2 K/UL (ref 0–0.5)
EOSINOPHIL # BLD AUTO: 0.2 K/UL (ref 0–0.5)
EOSINOPHIL NFR BLD: 1.6 % (ref 0–8)
EOSINOPHIL NFR BLD: 1.7 % (ref 0–8)
ERYTHROCYTE [DISTWIDTH] IN BLOOD BY AUTOMATED COUNT: 17.3 % (ref 11.5–14.5)
ERYTHROCYTE [DISTWIDTH] IN BLOOD BY AUTOMATED COUNT: 18.2 % (ref 11.5–14.5)
EST. GFR  (NO RACE VARIABLE): 12.8 ML/MIN/1.73 M^2
EST. GFR  (NO RACE VARIABLE): 17.3 ML/MIN/1.73 M^2
EST. GFR  (NO RACE VARIABLE): 21.4 ML/MIN/1.73 M^2
GLUCOSE SERPL-MCNC: 158 MG/DL (ref 70–110)
GLUCOSE SERPL-MCNC: 167 MG/DL (ref 70–110)
GLUCOSE SERPL-MCNC: 187 MG/DL (ref 70–110)
HCT VFR BLD AUTO: 19.3 % (ref 37–48.5)
HCT VFR BLD AUTO: 22.9 % (ref 37–48.5)
HGB BLD-MCNC: 6.4 G/DL (ref 12–16)
HGB BLD-MCNC: 7.4 G/DL (ref 12–16)
IMM GRANULOCYTES # BLD AUTO: 0.33 K/UL (ref 0–0.04)
IMM GRANULOCYTES # BLD AUTO: 0.38 K/UL (ref 0–0.04)
IMM GRANULOCYTES NFR BLD AUTO: 2.6 % (ref 0–0.5)
IMM GRANULOCYTES NFR BLD AUTO: 3.1 % (ref 0–0.5)
LYMPHOCYTES # BLD AUTO: 1.6 K/UL (ref 1–4.8)
LYMPHOCYTES # BLD AUTO: 1.7 K/UL (ref 1–4.8)
LYMPHOCYTES NFR BLD: 12.3 % (ref 18–48)
LYMPHOCYTES NFR BLD: 14.1 % (ref 18–48)
MAGNESIUM SERPL-MCNC: 1.7 MG/DL (ref 1.6–2.6)
MAGNESIUM SERPL-MCNC: 1.8 MG/DL (ref 1.6–2.6)
MAGNESIUM SERPL-MCNC: 1.9 MG/DL (ref 1.6–2.6)
MCH RBC QN AUTO: 27.4 PG (ref 27–31)
MCH RBC QN AUTO: 27.8 PG (ref 27–31)
MCHC RBC AUTO-ENTMCNC: 32.3 G/DL (ref 32–36)
MCHC RBC AUTO-ENTMCNC: 33.2 G/DL (ref 32–36)
MCV RBC AUTO: 83 FL (ref 82–98)
MCV RBC AUTO: 86 FL (ref 82–98)
MONOCYTES # BLD AUTO: 1.4 K/UL (ref 0.3–1)
MONOCYTES # BLD AUTO: 1.4 K/UL (ref 0.3–1)
MONOCYTES NFR BLD: 10.7 % (ref 4–15)
MONOCYTES NFR BLD: 11.5 % (ref 4–15)
NEUTROPHILS # BLD AUTO: 8.4 K/UL (ref 1.8–7.7)
NEUTROPHILS # BLD AUTO: 9.1 K/UL (ref 1.8–7.7)
NEUTROPHILS NFR BLD: 69.3 % (ref 38–73)
NEUTROPHILS NFR BLD: 72.5 % (ref 38–73)
NRBC BLD-RTO: 0 /100 WBC
NRBC BLD-RTO: 0 /100 WBC
NUM UNITS TRANS PACKED RBC: NORMAL
PHOSPHATE SERPL-MCNC: 4.9 MG/DL (ref 2.7–4.5)
PHOSPHATE SERPL-MCNC: 6.6 MG/DL (ref 2.7–4.5)
PHOSPHATE SERPL-MCNC: 8.4 MG/DL (ref 2.7–4.5)
PLATELET # BLD AUTO: 408 K/UL (ref 150–450)
PLATELET # BLD AUTO: 417 K/UL (ref 150–450)
PMV BLD AUTO: 10.2 FL (ref 9.2–12.9)
PMV BLD AUTO: 9.8 FL (ref 9.2–12.9)
POCT GLUCOSE: 135 MG/DL (ref 70–110)
POCT GLUCOSE: 153 MG/DL (ref 70–110)
POCT GLUCOSE: 159 MG/DL (ref 70–110)
POCT GLUCOSE: 165 MG/DL (ref 70–110)
POCT GLUCOSE: 165 MG/DL (ref 70–110)
POCT GLUCOSE: 186 MG/DL (ref 70–110)
POCT GLUCOSE: 206 MG/DL (ref 70–110)
POCT GLUCOSE: 206 MG/DL (ref 70–110)
POCT GLUCOSE: 59 MG/DL (ref 70–110)
POTASSIUM SERPL-SCNC: 4.3 MMOL/L (ref 3.5–5.1)
POTASSIUM SERPL-SCNC: 4.3 MMOL/L (ref 3.5–5.1)
POTASSIUM SERPL-SCNC: 4.4 MMOL/L (ref 3.5–5.1)
PROT SERPL-MCNC: 6.7 G/DL (ref 6–8.4)
RBC # BLD AUTO: 2.34 M/UL (ref 4–5.4)
RBC # BLD AUTO: 2.66 M/UL (ref 4–5.4)
SODIUM SERPL-SCNC: 133 MMOL/L (ref 136–145)
SODIUM SERPL-SCNC: 133 MMOL/L (ref 136–145)
SODIUM SERPL-SCNC: 135 MMOL/L (ref 136–145)
WBC # BLD AUTO: 12.07 K/UL (ref 3.9–12.7)
WBC # BLD AUTO: 12.56 K/UL (ref 3.9–12.7)

## 2024-02-11 PROCEDURE — 63600175 PHARM REV CODE 636 W HCPCS: Mod: JZ,JG | Performed by: INTERNAL MEDICINE

## 2024-02-11 PROCEDURE — 80053 COMPREHEN METABOLIC PANEL: CPT

## 2024-02-11 PROCEDURE — 94003 VENT MGMT INPAT SUBQ DAY: CPT

## 2024-02-11 PROCEDURE — 63600175 PHARM REV CODE 636 W HCPCS

## 2024-02-11 PROCEDURE — 27100171 HC OXYGEN HIGH FLOW UP TO 24 HOURS

## 2024-02-11 PROCEDURE — 25000003 PHARM REV CODE 250

## 2024-02-11 PROCEDURE — 80069 RENAL FUNCTION PANEL: CPT | Mod: 91 | Performed by: STUDENT IN AN ORGANIZED HEALTH CARE EDUCATION/TRAINING PROGRAM

## 2024-02-11 PROCEDURE — 99291 CRITICAL CARE FIRST HOUR: CPT | Mod: 24,,, | Performed by: STUDENT IN AN ORGANIZED HEALTH CARE EDUCATION/TRAINING PROGRAM

## 2024-02-11 PROCEDURE — 83735 ASSAY OF MAGNESIUM: CPT | Performed by: STUDENT IN AN ORGANIZED HEALTH CARE EDUCATION/TRAINING PROGRAM

## 2024-02-11 PROCEDURE — 20000000 HC ICU ROOM

## 2024-02-11 PROCEDURE — 25000003 PHARM REV CODE 250: Performed by: STUDENT IN AN ORGANIZED HEALTH CARE EDUCATION/TRAINING PROGRAM

## 2024-02-11 PROCEDURE — P9016 RBC LEUKOCYTES REDUCED: HCPCS | Performed by: STUDENT IN AN ORGANIZED HEALTH CARE EDUCATION/TRAINING PROGRAM

## 2024-02-11 PROCEDURE — 90945 DIALYSIS ONE EVALUATION: CPT

## 2024-02-11 PROCEDURE — 02HV33Z INSERTION OF INFUSION DEVICE INTO SUPERIOR VENA CAVA, PERCUTANEOUS APPROACH: ICD-10-PCS | Performed by: STUDENT IN AN ORGANIZED HEALTH CARE EDUCATION/TRAINING PROGRAM

## 2024-02-11 PROCEDURE — 90945 DIALYSIS ONE EVALUATION: CPT | Mod: ,,, | Performed by: INTERNAL MEDICINE

## 2024-02-11 PROCEDURE — 84100 ASSAY OF PHOSPHORUS: CPT | Performed by: STUDENT IN AN ORGANIZED HEALTH CARE EDUCATION/TRAINING PROGRAM

## 2024-02-11 PROCEDURE — 27000923 HC TRIALYSIS CATHETER, ANY SIZE

## 2024-02-11 PROCEDURE — 99900035 HC TECH TIME PER 15 MIN (STAT)

## 2024-02-11 PROCEDURE — 63600175 PHARM REV CODE 636 W HCPCS: Mod: JZ,JG | Performed by: STUDENT IN AN ORGANIZED HEALTH CARE EDUCATION/TRAINING PROGRAM

## 2024-02-11 PROCEDURE — 99232 SBSQ HOSP IP/OBS MODERATE 35: CPT | Mod: ,,, | Performed by: PHYSICIAN ASSISTANT

## 2024-02-11 PROCEDURE — 99900026 HC AIRWAY MAINTENANCE (STAT)

## 2024-02-11 PROCEDURE — 25000242 PHARM REV CODE 250 ALT 637 W/ HCPCS

## 2024-02-11 PROCEDURE — 86920 COMPATIBILITY TEST SPIN: CPT | Performed by: STUDENT IN AN ORGANIZED HEALTH CARE EDUCATION/TRAINING PROGRAM

## 2024-02-11 PROCEDURE — 85025 COMPLETE CBC W/AUTO DIFF WBC: CPT | Mod: 91

## 2024-02-11 PROCEDURE — 85025 COMPLETE CBC W/AUTO DIFF WBC: CPT

## 2024-02-11 PROCEDURE — 83735 ASSAY OF MAGNESIUM: CPT | Mod: 91 | Performed by: STUDENT IN AN ORGANIZED HEALTH CARE EDUCATION/TRAINING PROGRAM

## 2024-02-11 PROCEDURE — 94761 N-INVAS EAR/PLS OXIMETRY MLT: CPT

## 2024-02-11 RX ORDER — HYDROCODONE BITARTRATE AND ACETAMINOPHEN 500; 5 MG/1; MG/1
TABLET ORAL CONTINUOUS
Status: ACTIVE | OUTPATIENT
Start: 2024-02-11 | End: 2024-02-12

## 2024-02-11 RX ORDER — MAGNESIUM SULFATE HEPTAHYDRATE 40 MG/ML
2 INJECTION, SOLUTION INTRAVENOUS
Status: DISCONTINUED | OUTPATIENT
Start: 2024-02-11 | End: 2024-02-12

## 2024-02-11 RX ORDER — HYDROCODONE BITARTRATE AND ACETAMINOPHEN 500; 5 MG/1; MG/1
TABLET ORAL
Status: DISCONTINUED | OUTPATIENT
Start: 2024-02-11 | End: 2024-02-16

## 2024-02-11 RX ADMIN — INSULIN ASPART 2 UNITS: 100 INJECTION, SOLUTION INTRAVENOUS; SUBCUTANEOUS at 03:02

## 2024-02-11 RX ADMIN — INSULIN ASPART 6 UNITS: 100 INJECTION, SOLUTION INTRAVENOUS; SUBCUTANEOUS at 03:02

## 2024-02-11 RX ADMIN — SEVELAMER CARBONATE 1.6 G: 800 POWDER, FOR SUSPENSION ORAL at 04:02

## 2024-02-11 RX ADMIN — SEVELAMER CARBONATE 1.6 G: 800 POWDER, FOR SUSPENSION ORAL at 08:02

## 2024-02-11 RX ADMIN — CEFTRIAXONE 2 G: 2 INJECTION, POWDER, FOR SOLUTION INTRAMUSCULAR; INTRAVENOUS at 09:02

## 2024-02-11 RX ADMIN — PSYLLIUM HUSK 1 PACKET: 3.4 POWDER ORAL at 08:02

## 2024-02-11 RX ADMIN — AMLODIPINE BESYLATE 10 MG: 10 TABLET ORAL at 08:02

## 2024-02-11 RX ADMIN — HEPARIN SODIUM 7500 UNITS: 5000 INJECTION INTRAVENOUS; SUBCUTANEOUS at 05:02

## 2024-02-11 RX ADMIN — INSULIN ASPART 4 UNITS: 100 INJECTION, SOLUTION INTRAVENOUS; SUBCUTANEOUS at 11:02

## 2024-02-11 RX ADMIN — METRONIDAZOLE 500 MG: 5 INJECTION, SOLUTION INTRAVENOUS at 05:02

## 2024-02-11 RX ADMIN — SODIUM CHLORIDE: 0.9 INJECTION, SOLUTION INTRAVENOUS at 02:02

## 2024-02-11 RX ADMIN — INSULIN ASPART 6 UNITS: 100 INJECTION, SOLUTION INTRAVENOUS; SUBCUTANEOUS at 12:02

## 2024-02-11 RX ADMIN — SEVELAMER CARBONATE 1.6 G: 800 POWDER, FOR SUSPENSION ORAL at 01:02

## 2024-02-11 RX ADMIN — CARVEDILOL 12.5 MG: 12.5 TABLET, FILM COATED ORAL at 08:02

## 2024-02-11 RX ADMIN — INSULIN ASPART 2 UNITS: 100 INJECTION, SOLUTION INTRAVENOUS; SUBCUTANEOUS at 12:02

## 2024-02-11 RX ADMIN — METRONIDAZOLE 500 MG: 5 INJECTION, SOLUTION INTRAVENOUS at 02:02

## 2024-02-11 RX ADMIN — HEPARIN SODIUM 7500 UNITS: 5000 INJECTION INTRAVENOUS; SUBCUTANEOUS at 09:02

## 2024-02-11 RX ADMIN — HEPARIN SODIUM 7500 UNITS: 5000 INJECTION INTRAVENOUS; SUBCUTANEOUS at 02:02

## 2024-02-11 RX ADMIN — INSULIN ASPART 2 UNITS: 100 INJECTION, SOLUTION INTRAVENOUS; SUBCUTANEOUS at 07:02

## 2024-02-11 RX ADMIN — INSULIN ASPART 6 UNITS: 100 INJECTION, SOLUTION INTRAVENOUS; SUBCUTANEOUS at 11:02

## 2024-02-11 RX ADMIN — ALTEPLASE 4 MG: 2.2 INJECTION, POWDER, LYOPHILIZED, FOR SOLUTION INTRAVENOUS at 01:02

## 2024-02-11 RX ADMIN — INSULIN ASPART 6 UNITS: 100 INJECTION, SOLUTION INTRAVENOUS; SUBCUTANEOUS at 07:02

## 2024-02-11 RX ADMIN — FAMOTIDINE 20 MG: 20 TABLET, FILM COATED ORAL at 08:02

## 2024-02-11 RX ADMIN — METRONIDAZOLE 500 MG: 5 INJECTION, SOLUTION INTRAVENOUS at 10:02

## 2024-02-11 NOTE — ASSESSMENT & PLAN NOTE
Neuro/Psych:   #Acute Encephalopathy  CTH 2/3 with scattered hypoattenuation likely representing age indeterminate infarcts. EEG findings consistent with moderate-severe encephalopathy. MRI 2/6: Several scattered punctate acute infarcts throughout the bilateral frontal lobes, centrum semiovale, basal ganglia, corpus callosum, and cerebellar hemispheres.  CTA 2/6: Atherosclerotic plaquing of the carotid bifurcations and proximal ICAs with less than 50% proximal ICA stenosis by NASCET criteria . 2/7 CTH unchanged from prior CT. Ddx includes septic embolic vs inflammatory vs hypercoagulopathy.     -- Sedation: None  -- Pain: kermit tylenol, dialudid and oxy prn  -- GCS 7T: E2, V1T, M4; exam stable  -- Neurology following; appreciate recs  -- Neurovascular following; appreciate recs  -- Palliative following; GOC conversation 2/7; appreciate recs  -- MRI/MRA from yesterday              Cards:   -- HDS  -- goal SBP < 180, MAP >65  -- hypertensive, hydralazine and labetalol prns  -- Continue amlodipine 10mg daily;  coreg 6.25mg BID      Pulm:   #Acute Respiratory Failure  -- Intubated on spontaneous , Pressure support.  -- Goal O2 sat > 90%      Renal:  #ALVARADO on CKD  #ATN  Received HD 2/9  -- Phos elevated 8.4 (7.7)  -- Nephrology following; appreciate recs  -- RRT as needed    Recent Labs   Lab 02/09/24  1312 02/10/24  0333 02/11/24  0305   BUN 37* 41*  41* 51*   CREATININE 3.3* 3.5*  3.5* 4.0*        FEN / GI:   -- Daily CMPs  -- NGT in place   -- Replace lytes as needed  -- Nutrition: NPO, TF (Novasource Renal) at goal 30 ml/hr   -- GI PPX   -- Nutrition following; appreciate recs  -- Continue psyllium      ID:    #Ayaz's gangrene  s/p OR debridement for nec fascitis. R groin tissue with proteus, sensitive to ceftriaxone. Growing bacteroids as well. Debridement and wound vac change 2/7  -- Thin, serosanguineous wound vac output  -- Abx: ceftriaxone and flagyl  -- ID following ; appreciate recs    Recent Labs   Lab  02/09/24  0335 02/10/24  0333 02/11/24  0305   WBC 16.02*  16.02* 15.42* 12.07        Heme/Onc:  -- Daily CBC  -- Hb 6.4 overnight. Received 1 unit of pRBC  -- Heparin DVT ppx    Recent Labs   Lab 02/09/24  0335 02/10/24  0333 02/11/24  0305   HGB 7.1*  7.1* 7.0* 6.4*     [phone]        Endo:   #IDDM  Initially presented to OSH with DKA with latest A1C 10.4 AG Gap resolved  - Placed on insulin gtt 2/3: Transition IV insulin infusion at 1.8 u/hr with stepdown parameters   - q4h BG monitoring while NPO  - Resume Novolog to 10 units units q 4 hrs when tube feeds restarted   - Moderate dose SSI PRN  - Endocrine following, appreciate recs        PPx:   Feeding: NPO  Analgesia/Sedation: See above  Thromboembolic prevention: [facility]   HOB >30: Y  Stress Ulcer ppx: Famotidine  Glucose control: Critical care goal 140-180 g/dl, Insulin gtt, kermit novolog, SSI, Endo following  Bowel reg: psyllium  Invasive Lines/Drains/Airway: Glynn, ETT, Art line, Trialysis, PIV x2, Rectal tube      Dispo/Code Status/Palliative:   -- SICU / Full Code

## 2024-02-11 NOTE — SUBJECTIVE & OBJECTIVE
"Interval HPI:   No acute events overnight. Patient in room 13098/58758 A. Blood glucose stable. BG at goal on current insulin regimen (Transition Insulin Drip). Steroid use- None .   Renal function- Abnormal -   Vasopressors-  None      Diet NPO      Eating:   NPO  Nausea: No  Hypoglycemia and intervention: No  Fever: No  TPN and/or TF: Yes 30 cc/hr  BP (!) 154/67 (BP Location: Left arm, Patient Position: Lying)   Pulse 92   Temp 99 °F (37.2 °C) (Oral)   Resp (!) 21   Ht 5' 5" (1.651 m)   Wt (!) 153.3 kg (338 lb)   LMP 01/01/2024 (Approximate) Comment: tubal ligation  SpO2 98%   BMI 56.25 kg/m²     Labs Reviewed and Include    Recent Labs   Lab 02/11/24  0305   *   CALCIUM 8.2*   ALBUMIN 1.8*   PROT 6.7   *   K 4.4   CO2 18*      BUN 51*   CREATININE 4.0*   ALKPHOS 114   ALT 13   AST 32   BILITOT 0.2     Lab Results   Component Value Date    WBC 12.07 02/11/2024    HGB 6.4 (L) 02/11/2024    HCT 19.3 (LL) 02/11/2024    MCV 83 02/11/2024     02/11/2024     No results for input(s): "TSH", "FREET4" in the last 168 hours.  Lab Results   Component Value Date    HGBA1C 10.4 (H) 01/30/2024       Nutritional status:   Body mass index is 56.25 kg/m².  Lab Results   Component Value Date    ALBUMIN 1.8 (L) 02/11/2024    ALBUMIN 2.1 (L) 02/10/2024    ALBUMIN 2.1 (L) 02/10/2024     No results found for: "PREALBUMIN"    Estimated Creatinine Clearance: 24.5 mL/min (A) (based on SCr of 4 mg/dL (H)).    Accu-Checks  Recent Labs     02/10/24  0004 02/10/24  0333 02/10/24  0743 02/10/24  1316 02/10/24  1607 02/10/24  1945 02/11/24  0009 02/11/24  0307 02/11/24  0309 02/11/24  0721   POCTGLUCOSE 198* 178* 134* 175* 160* 186* 206* 59* 165* 159*       Current Medications and/or Treatments Impacting Glycemic Control  Immunotherapy:    Immunosuppressants       None          Steroids:   Hormones (From admission, onward)      None          Pressors:    Autonomic Drugs (From admission, onward)      None      "     Hyperglycemia/Diabetes Medications:   Antihyperglycemics (From admission, onward)      Start     Stop Route Frequency Ordered    02/10/24 1115  insulin regular in 0.9 % NaCl 100 unit/100 mL (1 unit/mL) infusion        Question:  Enter initial dose (Units/hr):  Answer:  0.6    -- IV Continuous 02/10/24 1100    02/09/24 1200  insulin aspart U-100 pen 6 Units         -- SubQ Every 4 hours 02/09/24 0901    02/03/24 1010  insulin aspart U-100 pen 0-10 Units         -- SubQ Every 4 hours PRN 02/03/24 0911

## 2024-02-11 NOTE — SUBJECTIVE & OBJECTIVE
Interval History: Patient seen and examined this AM. Currently undergoing SBT. No acute events overnight. No much change in UOP from yesterday despite escalating diuretics. Receiving one unit of pRBCs today. Plan for 8 hours of SLED for metabolic clearance and volume management.    Review of patient's allergies indicates:  No Known Allergies  Current Facility-Administered Medications   Medication Frequency    0.9%  NaCl infusion (for blood administration) Q24H PRN    0.9%  NaCl infusion PRN    0.9%  NaCl infusion Once    albuterol-ipratropium 2.5 mg-0.5 mg/3 mL nebulizer solution 3 mL Q4H PRN    amLODIPine tablet 10 mg Daily    carvediloL tablet 12.5 mg BID    cefTRIAXone (ROCEPHIN) 2 g in dextrose 5 % in water (D5W) 100 mL IVPB (MB+) Q24H    dextrose 10 % infusion Continuous PRN    dextrose 10% bolus 125 mL 125 mL PRN    dextrose 10% bolus 250 mL 250 mL PRN    famotidine tablet 20 mg Daily    glucagon (human recombinant) injection 1 mg PRN    glucose chewable tablet 16 g PRN    glucose chewable tablet 24 g PRN    heparin (porcine) injection 7,500 Units Q8H    hydrALAZINE injection 10 mg Q4H PRN    insulin aspart U-100 pen 0-10 Units Q4H PRN    insulin aspart U-100 pen 6 Units Q4H    insulin regular in 0.9 % NaCl 100 unit/100 mL (1 unit/mL) infusion Continuous    labetalol 20 mg/4 mL (5 mg/mL) IV syring Q6H PRN    metronidazole IVPB 500 mg Q8H    naloxone 0.4 mg/mL injection 0.02 mg PRN    ondansetron injection 4 mg Q6H PRN    oxyCODONE 5 mg/5 mL solution 10 mg Q6H PRN    oxyCODONE 5 mg/5 mL solution 5 mg Q6H PRN    prochlorperazine injection Soln 5 mg Q6H PRN    psyllium husk (aspartame) 3.4 gram packet 1 packet BID    sevelamer carbonate pwpk 1.6 g TID WM    sodium chloride 0.9% bolus 250 mL 250 mL PRN    sodium chloride 0.9% bolus 250 mL 250 mL PRN    sodium chloride 0.9% flush 10 mL PRN       Objective:     Vital Signs (Most Recent):  Temp: 99 °F (37.2 °C) (02/11/24 0701)  Pulse: 87 (02/11/24 1015)  Resp: (!) 21  (02/11/24 1015)  BP: 131/63 (02/11/24 1000)  SpO2: 98 % (02/11/24 1015) Vital Signs (24h Range):  Temp:  [98.5 °F (36.9 °C)-99.6 °F (37.6 °C)] 99 °F (37.2 °C)  Pulse:  [] 87  Resp:  [15-32] 21  SpO2:  [96 %-100 %] 98 %  BP: (124-167)/(58-71) 131/63  Arterial Line BP: ()/(23-81) 123/58     Weight: (!) 153.3 kg (338 lb) (02/08/24 0300)  Body mass index is 56.25 kg/m².  Body surface area is 2.65 meters squared.    I/O last 3 completed shifts:  In: 2026.6 [I.V.:535.8; Blood:350; NG/GT:740; IV Piggyback:400.8]  Out: 1450 [Urine:750; Other:500; Stool:200]     Physical Exam  Vitals and nursing note reviewed.   Constitutional:       General: She is not in acute distress.     Appearance: She is obese. She is not ill-appearing or toxic-appearing.      Interventions: She is sedated and intubated.   Eyes:      General: No scleral icterus.        Right eye: No discharge.         Left eye: No discharge.   Cardiovascular:      Rate and Rhythm: Normal rate.   Pulmonary:      Effort: No respiratory distress. She is intubated.      Comments:       Abdominal:      General: There is distension.   Musculoskeletal:      Right lower leg: Edema present.      Left lower leg: Edema present.          Significant Labs:  ABGs:   Recent Labs   Lab 02/06/24  0747   PH 7.453*   PCO2 31.1*   HCO3 21.8*   POCSATURATED 99   BE -2       BMP:   Recent Labs   Lab 02/11/24 0305   *   *   K 4.4      CO2 18*   BUN 51*   CREATININE 4.0*   CALCIUM 8.2*   MG 1.9       CBC:   Recent Labs   Lab 02/11/24 0305   WBC 12.07   RBC 2.34*   HGB 6.4*   HCT 19.3*      MCV 83   MCH 27.4   MCHC 33.2       CMP:   Recent Labs   Lab 02/11/24  0305   *   CALCIUM 8.2*   ALBUMIN 1.8*   PROT 6.7   *   K 4.4   CO2 18*      BUN 51*   CREATININE 4.0*   ALKPHOS 114   ALT 13   AST 32   BILITOT 0.2       LFTs:   Recent Labs   Lab 02/11/24  0305   ALT 13   AST 32   ALKPHOS 114   BILITOT 0.2   PROT 6.7   ALBUMIN 1.8*        Microbiology Results (last 7 days)       Procedure Component Value Units Date/Time    Culture, Anaerobe [[id number]]  (Abnormal) Collected: 01/30/24 0228    Order Status: Completed Specimen: Wound from Groin Updated: 02/07/24 0748     Anaerobic Culture No anaerobes isolated      BACTEROIDES SPECIES  Few      Narrative:      Right groin tissue    Culture, Anaerobe [[id number]] Collected: 01/30/24 0219    Order Status: Completed Specimen: Abscess from Groin Updated: 02/07/24 0748     Anaerobic Culture No anaerobes isolated    Narrative:      Right groin abcess          Specimen (24h ago, onward)      None          Significant Imaging:  I have reviewed all imagining in the last 24 hours.

## 2024-02-11 NOTE — ASSESSMENT & PLAN NOTE
53 y.o. 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     - Last WV change 2/9. Plan to go back to the OR tomorrow for wound vac change, will obtain consent; please hold tube feeds at midnight in anticipation of vac change  - starting to discuss need for trach/PEG/permacath/diverting colostomy given that the family would like everything done; may plan for this late this week pending patient's clinical status and ongoing discussions with family regarding poor prognosis  - Palliative following given overall poor prognosis, daughter would like everything done; MRI slightly improved but no changes to patient's neuro status  - Daily SBTs  - Okay for DVT ppx   - Remainder of care per SICU

## 2024-02-11 NOTE — ANESTHESIA PREPROCEDURE EVALUATION
[facility]  Anesthesia Pre-Operative Evaluation         Patient Name: Sarah Saravia  YOB: 1970  MRN: [id number]    SUBJECTIVE:     Pre-operative evaluation for Procedure(s) (LRB):  REPLACEMENT, WOUND VAC (Right)  R thigh wound debridement (Right)  Scheduled for 2/12/2024    HPI 02/11/2024:  Sarah Saravia is a 53 y.o. 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 CTH with multiple age indeterminate infarcts and scattered acute infarcts.    Presents for above procedure.      Oxygen/Ventilation Requirements:  Vent Mode: Spont  Oxygen Concentration (%):  [40] 40  Resp Rate Total:  [14 br/min-148 br/min] 19 br/min  PEEP/CPAP:  [5 cmH20] 5 cmH20  Pressure Support:  [10 cmH20] 10 cmH20  Mean Airway Pressure:  [8.5 cmH20-9.4 cmH20] 9.1 cmH20        Patient Active Problem List   Diagnosis    Diabetic acidosis without coma    Ayaz's gangrene    Morbid (severe) obesity due to excess calories    Severe sepsis    ALVARADO (acute kidney injury)    Hyponatremia    Metabolic acidosis    Encephalopathy, metabolic    Acute hypoxemic respiratory failure    Weaning from mechanically assisted ventilation initiated    Acute blood loss anemia    Acute renal failure with tubular necrosis    New onset type 2 diabetes mellitus    Encephalopathy    Leukocytosis    Ac isch multi vasc territories stroke    Cerebral infarction    Encounter for palliative care    Hypoalbuminemia    Hyperphosphatemia       Review of patient's allergies indicates:  No Known Allergies    Outpatient Medications:  No current facility-administered medications on file prior to encounter.     No current outpatient medications on file prior to encounter.        Current Inpatient Medications:   sodium chloride 0.9%   Intravenous Once    amLODIPine  10 mg Per OG tube Daily    carvediloL  12.5 mg Per OG tube BID    cefTRIAXone  (Rocephin) IV (PEDS and ADULTS)  2 g Intravenous Q24H    famotidine  20 mg Per OG tube Daily    heparin (porcine)  7,500 Units Subcutaneous Q8H    insulin aspart U-100  6 Units Subcutaneous Q4H    metronidazole  500 mg Intravenous Q8H    psyllium husk (aspartame)  1 packet Per OG tube BID    sevelamer carbonate  1.6 g Per OG tube TID WM       Past Surgical History:   Procedure Laterality Date    INCISION AND DRAINAGE OF PERIRECTAL REGION N/A 1/29/2024    Procedure: INCISION AND DRAINAGE, PERIRECTAL REGION;  Surgeon: Axel Ramsay MD;  Location: [facility] OR;  Service: General;  Laterality: N/A;    PLACEMENT, TRIALYSIS CATH Right 1/31/2024    Procedure: INSERTION, CATHETER, TRIPLE LUMEN, HEMODIALYSIS, TEMPORARY;  Surgeon: Alvin Junior MD;  Location: [facility] OR;  Service: General;  Laterality: Right;    WOUND DEBRIDEMENT Bilateral 2/2/2024    Procedure: DEBRIDEMENT, WOUND;  Surgeon: Steve Cole MD;  Location: [facility] OR 2ND FLR;  Service: General;  Laterality: Bilateral;  Bilateral groin  Possible wound vac placement    WOUND DEBRIDEMENT Right 2/6/2024    Procedure: DEBRIDEMENT, WOUND, replace wound vac, possible closure;  Surgeon: Steve Cole MD;  Location: [facility] OR 2ND FLR;  Service: General;  Laterality: Right;  RLE    WOUND EXPLORATION Right 1/31/2024    Procedure: IRRIGATION & DEBRIDEMENT, WOUND DEBRIDEMENT;  Surgeon: Alvin Junior MD;  Location: [facility] OR;  Service: General;  Laterality: Right;       Social History     Socioeconomic History    Marital status: Single     Social Determinants of Health     Financial Resource Strain: Patient Unable To Answer (1/31/2024)    Overall Financial Resource Strain (CARDIA)     Difficulty of Paying Living Expenses: Patient unable to answer   Food Insecurity: Patient Declined (1/31/2024)    Hunger Vital Sign     Worried About Running Out of Food in the Last Year: Patient declined     Ran Out of Food in the Last Year: Patient declined   Transportation Needs:  Patient Unable To Answer (1/31/2024)    PRAPARE - Transportation     Lack of Transportation (Medical): Patient unable to answer     Lack of Transportation (Non-Medical): Patient unable to answer   Stress: Patient Unable To Answer (1/31/2024)    [employer] - Occupational Stress Questionnaire     Feeling of Stress : Patient unable to answer   Housing Stability: Patient Unable To Answer (1/31/2024)    Housing Stability Vital Sign     Unable to Pay for Housing in the Last Year: Patient unable to answer     Unstable Housing in the Last Year: Patient unable to answer       OBJECTIVE:     Weight:  Wt Readings from Last 1 Encounters:   02/08/24 (!) 153.3 kg (338 lb)     Body mass index is 56.25 kg/m².    Recent Blood Pressure Readings:  BP Readings from Last 3 Encounters:   02/11/24 (!) 166/70       Vital Signs Range (Last 24H):  Temp:  [36.9 °C (98.5 °F)-37.6 °C (99.6 °F)]   Pulse:  []   Resp:  [15-29]   BP: (124-167)/(58-71)   SpO2:  [97 %-100 %]   Arterial Line BP: (114-182)/(53-81)       CBC:   Lab Results   Component Value Date    WBC 12.07 02/11/2024    HGB 6.4 (L) 02/11/2024    HCT 19.3 (LL) 02/11/2024    MCV 83 02/11/2024     02/11/2024       CMP:     Chemistry        Component Value Date/Time     (L) 02/11/2024 0305    K 4.4 02/11/2024 0305     02/11/2024 0305    CO2 18 (L) 02/11/2024 0305    BUN 51 (H) 02/11/2024 0305    CREATININE 4.0 (H) 02/11/2024 0305     (H) 02/11/2024 0305        Component Value Date/Time    CALCIUM 8.2 (L) 02/11/2024 0305    ALKPHOS 114 02/11/2024 0305    AST 32 02/11/2024 0305    ALT 13 02/11/2024 0305    BILITOT 0.2 02/11/2024 0305            INR:  Lab Results   Component Value Date    INR 1.0 02/01/2024       Diagnostic Studies:      EKG:     Results for orders placed or performed during the hospital encounter of 01/29/24   EKG 12-lead    Collection Time: 01/29/24  7:14 PM    Narrative    Test Reason : R00.0,    Vent.  Rate : 111 BPM     Atrial Rate : 111 BPM     P-R Int : 134 ms          QRS Dur : 102 ms      QT Int : 344 ms       P-R-T Axes : 070 066 -49 degrees     QTc Int : 467 ms    Sinus tachycardia  LVH with repolarization abnormality  Abnormal ECG  No previous ECGs available  Confirmed by Rubén Ortiz MD ([id number]) on 1/31/2024 6:01:21 AM    Referred By: [email or web address]   SELF           Confirmed By:Rubén Ortiz MD       2D Echo:    No results found for this or any previous visit.    Results for orders placed or performed during the hospital encounter of 01/29/24   Echo   Result Value Ref Range    RA Width 3.19 cm    Left Atrium Major Axis 5.14 cm    Left Atrium Minor Axis 5.35 cm    RA Major Axis 4.87 cm    LV Diastolic Volume 81.98 mL    LV Systolic Volume 27.87 mL    MV Peak E Josselyn 0.77 m/s    Ao VTI 31.37 cm    Ao peak josselyn 2.01 m/s    LVOT peak VTI 16.92 cm    LVOT peak josselyn 1.29 m/s    LVOT diameter 2.02 cm    AV mean gradient 10 mmHg    TAPSE 1.72 cm    RVDD 3.16 cm    LA size 3.99 cm    Ascending aorta 2.57 cm    STJ 2.32 cm    Sinus 2.79 cm    LVIDs 2.73 2.1 - 4.0 cm    Posterior Wall 1.1 0.6 - 1.1 cm    IVS 1.3 (A) 0.6 - 1.1 cm    LVIDd 5.0 3.5 - 6.0 cm    TDI LATERAL 0.10 m/s    LA WIDTH 4.17 cm    TDI SEPTAL 0.07 m/s    LV LATERAL E/E' RATIO 7.70 m/s    LV SEPTAL E/E' RATIO 11.00 m/s    FS 45 (A) 28 - 44 %    LA volume 74.15 cm3    LV mass 233.75 g    ZLVIDD -11.05     ZLVIDS -9.36     Left Ventricle Relative Wall Thickness 0.44 cm    AV valve area 1.73 cm²    AV Velocity Ratio 0.64     AV index (prosthetic) 0.54     Mean e' 0.09 m/s    LVOT area 3.2 cm2    LVOT stroke volume 54.20 cm3    AV peak gradient 16 mmHg    E/E' ratio 9.06 m/s    LV Systolic Volume Index 11.0 mL/m2    LV Diastolic Volume Index 32.40 mL/m2    LA Volume Index 29.3 mL/m2    LV Mass Index 92 g/m2    EDWARD by Velocity Ratio 2.06 cm²    BSA 2.72 m2    Est. RA pres 3 mmHg    Narrative      Left Ventricle: The left ventricle is normal in size. Normal  wall   thickness. There is concentric remodeling. Normal wall motion. There is   normal systolic function with a visually estimated ejection fraction of 60   - 65%. There is normal diastolic function. Normal left ventricular filling   pressure.    Right Ventricle: Normal right ventricular cavity size. Wall thickness   is normal. Right ventricle wall motion  is normal. Systolic function is   normal.    Aorta: Aortic root is normal in size measuring 2.79 cm. Ascending aorta   is normal measuring 2.57 cm.    IVC/SVC: Normal venous pressure at 3 mmHg.         No results found for this or any previous visit.      ASSESSMENT/PLAN:           Pre-op Assessment    I have reviewed the Patient Summary Reports.     I have reviewed the Nursing Notes. I have reviewed the NPO Status.      Review of Systems  Anesthesia Hx:  No problems with previous Anesthesia   History of prior surgery of interest to airway management or planning:          Denies Family Hx of Anesthesia complications.    Denies Personal Hx of Anesthesia complications.                    Cardiovascular:         Denies CABG/stent.     Denies CHF.                                 Pulmonary:    Denies COPD.  Denies Asthma.                    Renal/:  Chronic Renal Disease, ARF, Dialysis                Neurological:   CVA    Denies Seizures.                                Endocrine:  Diabetes         Morbid Obesity / BMI > 40      Physical Exam  General: Well nourished    Airway:  Mallampati: unable to assess   Pre-Existing Airway: Oral Endotracheal tube    Chest/Lungs:  Mechanically ventilated  Heart:  Rate: Normal      Anesthesia Plan  Type of Anesthesia, risks & benefits discussed:    Anesthesia Type: Gen ETT  Intra-op Monitoring Plan: Standard ASA Monitors  Post Op Pain Control Plan: multimodal analgesia and IV/PO Opioids PRN  Induction:  IV  Airway Plan: Direct  Informed Consent: Informed consent signed with the Patient representative and all parties  understand the risks and agree with anesthesia plan.  All questions answered.   ASA Score: 4  Day of Surgery Review of History & Physical: H&P Update referred to the surgeon/provider.    Ready For Surgery From Anesthesia Perspective.     .

## 2024-02-11 NOTE — ASSESSMENT & PLAN NOTE
Lab Results   Component Value Date    CREATININE 4.0 (H) 02/11/2024     Avoid insulin stacking  Titrate insulin slowly

## 2024-02-11 NOTE — PLAN OF CARE
Infectious Disease Note      Chart has been reviewed.  Afebrile and without leukocytosis. MRA/MRV reviewed. Intraoperative JAY without abnormalities per EMR documentation.    Recommendations:  No changes in ID recommendations. Please see progress note from 2/10 for formal antibiotic recommendations.    Infectious Diseases will sign off. Please call if questions arise.  Devika Andujar MD, Counts include 234 beds at the [facility]  Infectious Diseases

## 2024-02-11 NOTE — PROGRESS NOTES
[facility]  Nephrology  Progress Note    Patient Name: Sarah Saravia  MRN: [id number]  Admission Date: 1/29/2024  Hospital Length of Stay: 13 days  Attending Provider: Steve Cole MD   Primary Care Physician: [facility]  Principal Problem:Ayaz's gangrene    Subjective:     HPI: Sarah Saravia is a 53 year old female with a past medical history of obesity (BMI 53) admitted with N/V and R groin wound found to be in DKA at OSH.  She underwent a CT abdomen and pelvis that showed extensive soft tissue air throughout the right groin and inguinal region and extending to involve the right lower quadrant anterior abdominal wall with extensive soft tissue air also seen involving the proximal medial aspect of the right thigh, concerning for gas-forming infection. She is s/p OR debridement for necrotizing fascitis on 1/29/24 and take back for 1/31/24. Right groin tissue growing proteus, susceptibilities still pending. Currently on meropenem and clindamycin which was d/c'd on 1/31/24. While at OSH pt developed acute respiratory failure requiring intubation. Nephrology was consulted for worsening alvarado in the setting of lactic acidosis and septic shock likely ATN, sCr elevated at 3.8 on admit.  OR today for debridement with possible closure and wound vac placement. Last HD 2/1. Net -1.3L. Electrolytes stable. Nephrology consulted for ALVARADO.     Interval History: Patient seen and examined this AM. Currently undergoing SBT. No acute events overnight. No much change in UOP from yesterday despite escalating diuretics. Receiving one unit of pRBCs today. Plan for 8 hours of SLED for metabolic clearance and volume management.    Review of patient's allergies indicates:  No Known Allergies  Current Facility-Administered Medications   Medication Frequency    0.9%  NaCl infusion (for blood administration) Q24H PRN    0.9%  NaCl infusion PRN    0.9%  NaCl infusion Once     albuterol-ipratropium 2.5 mg-0.5 mg/3 mL nebulizer solution 3 mL Q4H PRN    amLODIPine tablet 10 mg Daily    carvediloL tablet 12.5 mg BID    cefTRIAXone (ROCEPHIN) 2 g in dextrose 5 % in water (D5W) 100 mL IVPB (MB+) Q24H    dextrose 10 % infusion Continuous PRN    dextrose 10% bolus 125 mL 125 mL PRN    dextrose 10% bolus 250 mL 250 mL PRN    famotidine tablet 20 mg Daily    glucagon (human recombinant) injection 1 mg PRN    glucose chewable tablet 16 g PRN    glucose chewable tablet 24 g PRN    heparin (porcine) injection 7,500 Units Q8H    hydrALAZINE injection 10 mg Q4H PRN    insulin aspart U-100 pen 0-10 Units Q4H PRN    insulin aspart U-100 pen 6 Units Q4H    insulin regular in 0.9 % NaCl 100 unit/100 mL (1 unit/mL) infusion Continuous    labetalol 20 mg/4 mL (5 mg/mL) IV syring Q6H PRN    metronidazole IVPB 500 mg Q8H    naloxone 0.4 mg/mL injection 0.02 mg PRN    ondansetron injection 4 mg Q6H PRN    oxyCODONE 5 mg/5 mL solution 10 mg Q6H PRN    oxyCODONE 5 mg/5 mL solution 5 mg Q6H PRN    prochlorperazine injection Soln 5 mg Q6H PRN    psyllium husk (aspartame) 3.4 gram packet 1 packet BID    sevelamer carbonate pwpk 1.6 g TID WM    sodium chloride 0.9% bolus 250 mL 250 mL PRN    sodium chloride 0.9% bolus 250 mL 250 mL PRN    sodium chloride 0.9% flush 10 mL PRN       Objective:     Vital Signs (Most Recent):  Temp: 99 °F (37.2 °C) (02/11/24 0701)  Pulse: 87 (02/11/24 1015)  Resp: (!) 21 (02/11/24 1015)  BP: 131/63 (02/11/24 1000)  SpO2: 98 % (02/11/24 1015) Vital Signs (24h Range):  Temp:  [98.5 °F (36.9 °C)-99.6 °F (37.6 °C)] 99 °F (37.2 °C)  Pulse:  [] 87  Resp:  [15-32] 21  SpO2:  [96 %-100 %] 98 %  BP: (124-167)/(58-71) 131/63  Arterial Line BP: ()/(23-81) 123/58     Weight: (!) 153.3 kg (338 lb) (02/08/24 0300)  Body mass index is 56.25 kg/m².  Body surface area is 2.65 meters squared.    I/O last 3 completed shifts:  In: 2026.6 [I.V.:535.8; Blood:350; NG/GT:740; IV  Piggyback:400.8]  Out: 1450 [Urine:750; Other:500; Stool:200]    Physical Exam  Vitals and nursing note reviewed.   Constitutional:       General: She is not in acute distress.     Appearance: She is obese. She is not ill-appearing or toxic-appearing.      Interventions: She is sedated and intubated.   Eyes:      General: No scleral icterus.        Right eye: No discharge.         Left eye: No discharge.   Cardiovascular:      Rate and Rhythm: Normal rate.   Pulmonary:      Effort: No respiratory distress. She is intubated.      Comments:       Abdominal:      General: There is distension.   Musculoskeletal:      Right lower leg: Edema present.      Left lower leg: Edema present.          Significant Labs:  ABGs:   Recent Labs   Lab 02/06/24 0747   PH 7.453*   PCO2 31.1*   HCO3 21.8*   POCSATURATED 99   BE -2       BMP:   Recent Labs   Lab 02/11/24 0305   *   *   K 4.4      CO2 18*   BUN 51*   CREATININE 4.0*   CALCIUM 8.2*   MG 1.9       CBC:   Recent Labs   Lab 02/11/24 0305   WBC 12.07   RBC 2.34*   HGB 6.4*   HCT 19.3*      MCV 83   MCH 27.4   MCHC 33.2       CMP:   Recent Labs   Lab 02/11/24 0305   *   CALCIUM 8.2*   ALBUMIN 1.8*   PROT 6.7   *   K 4.4   CO2 18*      BUN 51*   CREATININE 4.0*   ALKPHOS 114   ALT 13   AST 32   BILITOT 0.2       LFTs:   Recent Labs   Lab 02/11/24 0305   ALT 13   AST 32   ALKPHOS 114   BILITOT 0.2   PROT 6.7   ALBUMIN 1.8*       Microbiology Results (last 7 days)       Procedure Component Value Units Date/Time    Culture, Anaerobe [[id number]]  (Abnormal) Collected: 01/30/24 0228    Order Status: Completed Specimen: Wound from Groin Updated: 02/07/24 0748     Anaerobic Culture No anaerobes isolated      BACTEROIDES SPECIES  Few      Narrative:      Right groin tissue    Culture, Anaerobe [[id number]] Collected: 01/30/24 0219    Order Status: Completed Specimen: Abscess from Groin Updated: 02/07/24 0748     Anaerobic Culture No  anaerobes isolated    Narrative:      Right groin abcess          Specimen (24h ago, onward)      None          Significant Imaging:  I have reviewed all imagining in the last 24 hours.  Assessment/Plan:     Renal/  * Ayaz's gangrene  Severe sepsis  - management per primary     ALVARADO (acute kidney injury)  Transfer from [facility]. Admitted for fourniers gangrene. Initiated HD on 1/31. ALVARADO 2/2 ATN in setting of septic shock. Arrived with CR 3.8. Unknown baseline.  Plan to OR today. Net -1.3L.OR today for debridement with possible closure and wound vac placement     ALVARADO most likley ATN in setting of septic shock     Plan/Recommendation  -Plan to for 8 hours of SLED today for metabolic clearance and volume management   -Daily RFP   -Strict I&Os  -Avoid nephrotoxins, if possible     Thank you for your consult. I will follow-up with patient. Please contact us if you have any additional questions.    Wilian Cat MD  Nephrology  [facility]

## 2024-02-11 NOTE — PROGRESS NOTES
02/11/24 1436   Treatment   Treatment Type SLED   Treatment Status New start   Dialysis Machine Number [id number]   Solutions Labeled and Current  Yes   Access Temporary Cath;Right;IJ   Catheter Dressing Intact  Yes   Alarms Engaged Yes     SLED started without issue, pt left in NAD/VSS. Report given to primary RN, will increase UF to goal as tolerated.

## 2024-02-11 NOTE — PLAN OF CARE
"Please link this note to the resident's progress note. This note serves as the attestation.    In brief, Sarah Saravia is a 53 year-old woman with a history of hypertension, morbid obesity, renal insufficiency, and type 2 diabetes who was admitted as a transfer for necrotizing soft tissue infection.  She has undergone debridements for treatment.  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      Critical Care issues in this patient include:    Ayaz's gangrene              * Underwent debridement/wound vac exchange on 2/9. Plan for vac change on Monday 2/12. Continue rocephin and flagyl. ID consulted and are following. Appreciate their recommendations. Wound cultures growing pansensitive proteus mirabilis and Bacteroides. Blood cultures have been NGTD.      Encephalopathy, metabolic              * Initially thought to be due to sepsis and severe infection. Neurology consulted and are following. EEG done and shows severe slowing and no seizures. MRI brain done recently revealed "several scattered punctate acute infarcts throughout the bilateral frontal lobes, centrum semiovale, basal ganglia, corpus callosum, and cerebellar hemispheres" concerning for embolic source. Obtained echo to evaluate for cardiac vegetations or PFO; none noted. Consulted Vascular Neurology per Neurology (general). Appreciate their recs. CTA head and neck done and no significant occlusion noted. JAY not performed by Cardiology because they do not believe it'll change her treatment, which I disagree with. JAY done by cardiac anesthesia yesterday did not show any vegetations. MRI with contrast--discussed with rads and vasc neuro. Don't believe she needs it. Will repeat brain MRI and see if there has been any changes. If not, she needs an LP.     Acute renal failure with tubular necrosis              * ARF on CKD. Meza replaced and she is starting to make more urine. Keep meza " in place for now. Nephrology has been consulted and are following. Appreciate recs. Failed recent lasix challenge. Last had HD on 2/9. Trialysis replaced 2/6 and functioning ok. Monitor electrolytes. Phos remains very high. On novasource for TFs and Nephro believes that the source. Unclear. Continue phos binders.     Type 2 diabetes mellitus with hyperglycemia              * Endocrinology consulted and are following. Appreciate their recs. Poorly controlled BG at baseline with recent Hgb A1c at 10.4%. Continue with hourly glucose checks and titrate insulin infusion per protocol.      Encounter for weaning the ventilator              * Remains on pressure support on the vent. Cannot extubate secondary to poor mental status. CXR and ABG pending this morning. Continue with lung protective ventilation. Keep HOB elevated 30 degrees.     Patient remains stable in the ICU. Still not waking up despite not being on sedation for over a week. Continue tube feeds. Continue fiber for diarrhea. To . Continue GI and DVT prophylaxis. Continue ICU care.    Critical care time spent: 40 min    Critical care was time spent personally by me on the following activities: development of treatment plan with patient or surrogate and bedside caregivers, discussions with consultants, evaluation of patient's response to treatment, examination of patient, ordering and performing treatments and interventions, ordering and review of laboratory studies, ordering and review of radiographic studies, pulse oximetry, re-evaluation of patient's condition.  This critical care time did not overlap with that of any other provider or involve time for any procedures.      Karla Connelly MD, FACS  General Surgery and Surgical Critical Care  [facility]

## 2024-02-11 NOTE — ASSESSMENT & PLAN NOTE
53 F Hx obesity currently admitted to surgical ICU as transfer from  for necrotizing fascitis s/p surgical debirdement (1/30/24) and Vascular Neurology consulted for multi territory infarcts as well as abnormal restricted diffusion on L temporal lobe concerning for possible infarct/septic emboli. Patient is currently s/p surgical debridement of R groin nec fasc w + cx proteus as well as s/p wound vac. Gen Neuro originally consulted due to worsening neuro exam on 2/3 and CTH showed  hypoattenuations at left anterior temporal lobe w/encephalomalacia, and bilateral centrum semiovale, left anterior corpus callosum, right caudate, subinsular region. In the interim patient having worsening leukocytosis up to 31K today while on CTX and metronidazole with clear cultures. MRI brain 2/5/24 showed several scattered punctate acute infarcts at BL frontal lobes, centrum semiovale, basal ganglia, corpus callosum, and cerebellar hemispheres as well as focal locunar area of diffusion restriction at L temporal lobe.     MRI/MRA completed, showing left temporal lobe lesion improving and less prominent. MRV was negative for venous sinus thrombosis. At this time, differentials include septic embolic vs inflammatory vs hypercoagulopathy. No new recommendations at this time. Continuing to follow.          Antithrombotics for secondary stroke prevention: Antiplatelets: None: Risk of bleeding and need to rule out mycotic aneurysm     Statins for secondary stroke prevention and hyperlipidemia, if present:   Statins: Atorvastatin- 40 mg daily    Aggressive risk factor modification: HTN, HLD, Obesity, infection      Rehab efforts: The patient has been evaluated by a stroke team provider and the therapy needs have been fully considered based off the presenting complaints and exam findings. The following therapy evaluations are needed:  when able     Diagnostics ordered/pending: JAY & EEG    VTE prophylaxis: None: Reason for No Pharmacological  VTE Prophylaxis: Bacterial endocarditis concern     BP parameters: Infarct: normotension

## 2024-02-11 NOTE — PROGRESS NOTES
"[facility]  Endocrinology  Progress Note    Admit Date: 1/29/2024     Reason for Consult: Management of T2DM, Hyperglycemia     Surgical Procedure and Date: n/a    Diabetes diagnosis year: new onset     Home Diabetes Medications:  none per chart review and family present at bedside     Lab Results   Component Value Date    HGBA1C 10.4 (H) 01/30/2024       Diabetes Complications include:     Hyperglycemia, DKA at OSH     Complicating diabetes co morbidities:   Obesity       HPI:   Patient is a 53 y.o. female with a diagnosis of obesity (BMI 53) admitted with N/V and R groin wound found to be in DKA at OSH. 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 While at OSH pt developed acute respiratory failure requiring intubation. Pulmonology was consulted for ventilator management and critical illness. Nephrology was consulted for worsening vishal in the setting of lactic acidosis and septic shock likely ATN, sCr elevated at 3.8 on admit now 4.0 post HD. Pt being admitted to SICU for surgical critical care management. Immediate plans include hemodynamic stabilization, weaning of respiratory support, and RRT.  Endocrinology consulted for management of T2DM.                 Interval HPI:   No acute events overnight. Patient in room 33015/34905 A. Blood glucose stable. BG at goal on current insulin regimen (Transition Insulin Drip). Steroid use- None .   Renal function- Abnormal -   Vasopressors-  None      Diet NPO      Eating:   NPO  Nausea: No  Hypoglycemia and intervention: No  Fever: No  TPN and/or TF: Yes 30 cc/hr  BP (!) 154/67 (BP Location: Left arm, Patient Position: Lying)   Pulse 92   Temp 99 °F (37.2 °C) (Oral)   Resp (!) 21   Ht 5' 5" (1.651 m)   Wt (!) 153.3 kg (338 lb)   LMP 01/01/2024 (Approximate) Comment: tubal ligation  SpO2 98%   BMI 56.25 kg/m²     Labs Reviewed and Include    Recent " "Labs   Lab 02/11/24  0305   *   CALCIUM 8.2*   ALBUMIN 1.8*   PROT 6.7   *   K 4.4   CO2 18*      BUN 51*   CREATININE 4.0*   ALKPHOS 114   ALT 13   AST 32   BILITOT 0.2     Lab Results   Component Value Date    WBC 12.07 02/11/2024    HGB 6.4 (L) 02/11/2024    HCT 19.3 (LL) 02/11/2024    MCV 83 02/11/2024     02/11/2024     No results for input(s): "TSH", "FREET4" in the last 168 hours.  Lab Results   Component Value Date    HGBA1C 10.4 (H) 01/30/2024       Nutritional status:   Body mass index is 56.25 kg/m².  Lab Results   Component Value Date    ALBUMIN 1.8 (L) 02/11/2024    ALBUMIN 2.1 (L) 02/10/2024    ALBUMIN 2.1 (L) 02/10/2024     No results found for: "PREALBUMIN"    Estimated Creatinine Clearance: 24.5 mL/min (A) (based on SCr of 4 mg/dL (H)).    Accu-Checks  Recent Labs     02/10/24  0004 02/10/24  0333 02/10/24  0743 02/10/24  1316 02/10/24  1607 02/10/24  1945 02/11/24  0009 02/11/24  0307 02/11/24  0309 02/11/24  0721   POCTGLUCOSE 198* 178* 134* 175* 160* 186* 206* 59* 165* 159*       Current Medications and/or Treatments Impacting Glycemic Control  Immunotherapy:    Immunosuppressants       None          Steroids:   Hormones (From admission, onward)      None          Pressors:    Autonomic Drugs (From admission, onward)      None          Hyperglycemia/Diabetes Medications:   Antihyperglycemics (From admission, onward)      Start     Stop Route Frequency Ordered    02/10/24 1115  insulin regular in 0.9 % NaCl 100 unit/100 mL (1 unit/mL) infusion        Question:  Enter initial dose (Units/hr):  Answer:  0.6    -- IV Continuous 02/10/24 1100    02/09/24 1200  insulin aspart U-100 pen 6 Units         -- SubQ Every 4 hours 02/09/24 0901    02/03/24 1010  insulin aspart U-100 pen 0-10 Units         -- SubQ Every 4 hours PRN 02/03/24 0911            ASSESSMENT and PLAN    Renal/  * Ayaz's gangrene  Managed per primary team  Optimize BG control        ALVARADO (acute kidney " injury)  Lab Results   Component Value Date    CREATININE 4.0 (H) 02/11/2024     Avoid insulin stacking  Titrate insulin slowly       Endocrine  New onset type 2 diabetes mellitus  BG goal 140-180  No previous hx of T2DM per family at bedside. A1c of 10.4. DKA at OSH. TF on..BG stable on regimen.  Will continue to monitor.    Plan:  - Continue transition IV insulin infusion at 0.6 u/hr with stepdown parameters   - Continue Novolog 6 units q 4 hrs while on tube feeding (HOLD if tube feeding is stopped or BG < 100)   - Continue Moderate Dose Correction Scale  - BG monitoring q 4 hrs while NPO     ** Please call Endocrine for any BG related issues **      Discharge plans: TBD    Lab Results   Component Value Date    HGBA1C 10.4 (H) 01/30/2024             Salvador Alfaro PA-C  Endocrinology  [facility]

## 2024-02-11 NOTE — PROGRESS NOTES
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 Operative cultures showing P mirabilis. S/P wound VAC exchange on 2/6. S/p wound VAC exchange and debridement on 2/9. Afebrile (last 24 hour Tmax - 99.5) and with down trend in leukocytosis (WBC 2/10 - 15.42).  Managed by SICU.   Recommendations from Infectious Disease included:  Continue ceftriaxone 2 gm IV daily.  Continue metronidazole.   Recommend at least 2 weeks of therapy from 2/9. Anticipated end date: 2/22/24.  Monitor CBC and CMP weekly while on antibiotics.  Re-consult closer to antibiotic end date for further antibiotic recommendations if patient remains stable.  Surgical debridements as indicated for non viable tissue as per Surgical Service.  Discussed management plan with the staff and/or members from SICU and General Surgery service.     Ac isch multi vasc territories stroke  53 F Hx obesity currently admitted to surgical ICU as transfer from  for necrotizing fascitis s/p surgical debirdement (1/30/24) and Vascular Neurology consulted for multi territory infarcts as well as abnormal restricted diffusion on L temporal lobe concerning for possible infarct/septic emboli. Patient is currently s/p surgical debridement of R groin nec fasc w + cx proteus as well as s/p wound vac. Gen Neuro originally consulted due to worsening neuro exam on 2/3 and CTH showed  hypoattenuations at left anterior temporal lobe w/encephalomalacia, and bilateral centrum semiovale, left anterior corpus callosum, right caudate, subinsular region. In the interim patient having worsening leukocytosis up to 31K today while on CTX and metronidazole with clear cultures. MRI brain 2/5/24 showed several scattered punctate acute infarcts at BL frontal lobes, centrum semiovale, basal ganglia, corpus callosum, and  cerebellar hemispheres as well as focal locunar area of diffusion restriction at L temporal lobe.     MRI/MRA completed, showing left temporal lobe lesion improving and less prominent. MRV was negative for venous sinus thrombosis. At this time, differentials include septic embolic vs inflammatory vs hypercoagulopathy. No new recommendations at this time. Continuing to follow.          Antithrombotics for secondary stroke prevention: Antiplatelets: None: Risk of bleeding and need to rule out mycotic aneurysm     Statins for secondary stroke prevention and hyperlipidemia, if present:   Statins: Atorvastatin- 40 mg daily    Aggressive risk factor modification: HTN, HLD, Obesity, infection      Rehab efforts: The patient has been evaluated by a stroke team provider and the therapy needs have been fully considered based off the presenting complaints and exam findings. The following therapy evaluations are needed:  when able     Diagnostics ordered/pending: JAY & EEG    VTE prophylaxis: None: Reason for No Pharmacological VTE Prophylaxis: Bacterial endocarditis concern     BP parameters: Infarct: normotension         New onset type 2 diabetes mellitus  Stroke Risk Factor  Endocrine following, recommendations include:  -Continue transition IV insulin infusion at 0.6 u/hr with stepdown parameters   -Continue Novolog 6 units q 4 hrs while on tube feeding (HOLD if tube feeding is stopped or BG < 100)   -Continue Moderate Dose Correction Scale  -BG monitoring q 4 hrs while NPO       Weaning from mechanically assisted ventilation initiated  Managed by SICU    ALVARADO (acute kidney injury)  Followed by Nephrology, recommendations include:  -Plan to hold on RRT while awaiting MRI findings, trial of Lasix 200 mg IV and Diuril 500 mg IV today and assess response as minimal with Lasix 120 mg IV  -Daily RFP   -Strict I&Os  -Avoid nephrotoxins, if possible     Morbid (severe) obesity due to excess calories  Stroke Risk Factor  BMI  56.58  Educate on diet and exercise for weight loss when appropriate          02/07/2024: Worsening neurological exam (disconjugate gaze, L>R). NIH 30. Stat CTH.   02/10/2024: NIH 30. Remains in NCC. Intubated. MRI/MRA completed, showing left temporal lobe lesion improving and less prominent. MRV was negative for venous sinus thrombosis.     STROKE DOCUMENTATION        NIH Scale:  1a. Level of Consciousness: 3-->Responds only with reflex motor or autonomic effects or totally unresponsive, flaccid, and areflexic  1b. LOC Questions: 2-->Answers neither question correctly  1c. LOC Commands: 2-->Performs neither task correctly  2. Best Gaze: 0-->Normal  3. Visual: 0-->No visual loss  4. Facial Palsy: 0-->Normal symmetrical movements  5a. Motor Arm, Left: 4-->No movement  5b. Motor Arm, Right: 4-->No movement  6a. Motor Leg, Left: 4-->No movement  6b. Motor Leg, Right: 4-->No movement  7. Limb Ataxia: 0-->Absent  8. Sensory: 2-->Severe to total sensory loss, patient is not aware of being touched in the face, arm, and leg  9. Best Language: 3-->Mute, global aphasia, no usable speech or auditory comprehension  10. Dysarthria: (UN) Intubated or other physical barrier  11. Extinction and Inattention (formerly Neglect): 2-->Profound lamar-inattention/extinction more than 1 modality  Total (NIH Stroke Scale): 30       Modified Roma    Cece Coma Scale:    ABCD2 Score:    [id number] Score:   HAS -BLED Score:   ICH Score:   Hunt & Miles Classification:      Hemorrhagic change of an Ischemic Stroke: Does this patient have an ischemic stroke with hemorrhagic changes? No     Neurologic Chief Complaint: multi territory infarcts as well as abnormal restricted diffusion on L temporal lobe concerning for possible infarct/septic emboli    Subjective:     Interval History: Patient is seen for follow-up neurological assessment and treatment recommendations: NIH 30. Remains in NCC. Intubated. MRI/MRA completed, showing left temporal  lobe lesion improving and less prominent. MRV was negative for venous sinus thrombosis.     HPI, Past Medical, Family, and Social History remains the same as documented in the initial encounter.     Review of Systems   Unable to perform ROS: Intubated     Scheduled Meds:   sodium chloride 0.9%   Intravenous Once    amLODIPine  10 mg Per OG tube Daily    carvediloL  12.5 mg Per OG tube BID    cefTRIAXone (Rocephin) IV (PEDS and ADULTS)  2 g Intravenous Q24H    famotidine  20 mg Per OG tube Daily    heparin (porcine)  7,500 Units Subcutaneous Q8H    insulin aspart U-100  6 Units Subcutaneous Q4H    metronidazole  500 mg Intravenous Q8H    psyllium husk (aspartame)  1 packet Per OG tube BID    sevelamer carbonate  1.6 g Per OG tube TID WM     Continuous Infusions:   dextrose 10 % in water (D10W)      insulin regular 1 units/mL infusion orderable (TRANSFER) 0.6 Units/hr (02/10/24 1610)     PRN Meds:sodium chloride 0.9%, albuterol-ipratropium, dextrose 10 % in water (D10W), dextrose 10%, dextrose 10%, glucagon (human recombinant), glucose, glucose, hydrALAZINE, insulin aspart U-100, labetalol, naloxone, ondansetron, oxyCODONE, oxyCODONE, prochlorperazine, sodium chloride 0.9%, sodium chloride 0.9%, sodium chloride 0.9%    Objective:     Vital Signs (Most Recent):  Temp: 98.5 °F (36.9 °C) (02/10/24 1501)  Pulse: 85 (02/10/24 1600)  Resp: 15 (02/10/24 1600)  BP: (!) 162/67 (02/10/24 1600)  SpO2: 98 % (02/10/24 1600)  BP Location: Left arm    Vital Signs Range (Last 24H):  Temp:  [98.3 °F (36.8 °C)-99.5 °F (37.5 °C)]   Pulse:  [85-98]   Resp:  [15-32]   BP: (139-179)/(65-80)   SpO2:  [96 %-100 %]   Arterial Line BP: ()/(23-73)   BP Location: Left arm       Physical Exam  Vitals reviewed.   Constitutional:       Appearance: She is ill-appearing and toxic-appearing.   HENT:      Head: Normocephalic.   Pulmonary:      Comments: Intubated  Abdominal:      Palpations: Abdomen is soft.   Neurological:      Comments: Not  "following commands, not sedated.              Neurological Exam:   LOC: drowsy  Attention Span: poor  Language: Intubated  Articulation: Untestable due to intubation  Orientation: Person, Place, Time , Untestable due to intubation    Laboratory:  CMP:   Recent Labs   Lab 02/10/24  0333   CALCIUM 8.1*  8.1*   ALBUMIN 2.1*  2.1*   PROT 7.0  7.0   *  133*   K 4.3  4.3   CO2 17*  17*     104   BUN 41*  41*   CREATININE 3.5*  3.5*   ALKPHOS 132  132   ALT 11  11   AST 27  27   BILITOT 0.3  0.3     BMP:   Recent Labs   Lab 02/10/24  0333   *  133*   K 4.3  4.3     104   CO2 17*  17*   BUN 41*  41*   CREATININE 3.5*  3.5*   CALCIUM 8.1*  8.1*     CBC:   Recent Labs   Lab 02/10/24  0333   WBC 15.42*   RBC 2.60*   HGB 7.0*   HCT 21.4*      MCV 82   MCH 26.9*   MCHC 32.7     Lipid Panel: No results for input(s): "CHOL", "LDLCALC", "HDL", "TRIG" in the last 168 hours.  Coagulation: No results for input(s): "PT", "INR", "APTT" in the last 168 hours.  Platelet Aggregation Study: No results for input(s): "PLTAGG", "PLTAGINTERP", "PLTAGREGLACO", "ADPPLTAGGREG" in the last 168 hours.  Hgb A1C: No results for input(s): "HGBA1C" in the last 168 hours.  TSH: No results for input(s): "TSH" in the last 168 hours.    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       MRI/MRA Brain w/o Contrast - 2/10/24  Impression:  Similar appearance of the brain when compared to the prior study with no new lesion identified.  No midline shift herniation or intracranial hemorrhage.  The left temporal lobe lesion appears improved and less prominent than the prior study.  It is unclear whether these lesions represent embolic infarcts, or underlying infectious/inflammatory process including cerebritis/encephalitis or demyelinating disease.  No " advanced major branch stenosis/occlusion at the [id number].  No evidence of venous sinus thrombosis.    CT Head w/o Contrast - 2/7/24  Impression:  Brain appears grossly unchanged from prior study performed earlier on the same date.  No evidence of new hemorrhage or major vascular distribution infarct.      CTA Head and Neck - 02/06/2024  Impression:  Several scattered punctate acute infarcts throughout the bilateral frontal lobes, centrum semiovale, basal ganglia, corpus callosum, and cerebellar hemispheres.  Overall distribution is concerning for embolic etiology (septic emboli to be considered in light of clinical history of infection).  Please note this is somewhat confounded by larger focal locular area of diffusion restriction within the left temporal lobe anteriorly which is prominently involving the subcortical white matter.  While this may be unusual possible venous additional area of acute infarction sequela of tumefactive demyelination a consideration with infectious process with encephalitis/abscess not excluded.  Clinical correlation and further evaluation with post-contrast MRI brain if patient compatible.       MRI Brain - 02/06/2024  Impression:  Several scattered punctate acute infarcts throughout the bilateral frontal lobes, centrum semiovale, basal ganglia, corpus callosum, and cerebellar hemispheres.  Overall distribution is concerning for embolic etiology (septic emboli to be considered in light of clinical history of infection).  Please note this is somewhat confounded by larger focal locular area of diffusion restriction within the left temporal lobe anteriorly which is prominently involving the subcortical white matter.  While this may be unusual possible venous additional area of acute infarction sequela of tumefactive demyelination a consideration with infectious process with encephalitis/abscess not excluded.  Clinical correlation and further evaluation with post-contrast MRI brain  if patient compatible.            Cardiac Imaging:  TTE 02/06/2024     Summary       Left Ventricle: The left ventricle is normal in size. Normal wall thickness. There is concentric remodeling. Normal wall motion. There is normal systolic function with a visually estimated ejection fraction of 60 - 65%. There is normal diastolic function. Normal left ventricular filling pressure.    Right Ventricle: Normal right ventricular cavity size. Wall thickness is normal. Right ventricle wall motion  is normal. Systolic function is normal.    Aorta: Aortic root is normal in size measuring 2.79 cm. Ascending aorta is normal measuring 2.57 cm.    IVC/SVC: Normal venous pressure at 3 mmHg.    Yana Soria NP  [facility]  Department of Vascular Neurology   [facility]

## 2024-02-11 NOTE — SUBJECTIVE & OBJECTIVE
Interval History: No acute events overnight. No changes to her neuro exam. MRI showed similar L temporal lobe lesion improved from prior, no other acute processes. HDS. Remains intubated on PS. Wound vac to suction.    Medications:  Continuous Infusions:   dextrose 10 % in water (D10W)      insulin regular 1 units/mL infusion orderable (TRANSFER) 0.6 Units/hr (02/11/24 0800)     Scheduled Meds:   sodium chloride 0.9%   Intravenous Once    amLODIPine  10 mg Per OG tube Daily    carvediloL  12.5 mg Per OG tube BID    cefTRIAXone (Rocephin) IV (PEDS and ADULTS)  2 g Intravenous Q24H    famotidine  20 mg Per OG tube Daily    heparin (porcine)  7,500 Units Subcutaneous Q8H    insulin aspart U-100  6 Units Subcutaneous Q4H    metronidazole  500 mg Intravenous Q8H    psyllium husk (aspartame)  1 packet Per OG tube BID    sevelamer carbonate  1.6 g Per OG tube TID WM     PRN Meds:0.9%  NaCl infusion (for blood administration), sodium chloride 0.9%, albuterol-ipratropium, dextrose 10 % in water (D10W), dextrose 10%, dextrose 10%, glucagon (human recombinant), glucose, glucose, hydrALAZINE, insulin aspart U-100, labetalol, naloxone, ondansetron, oxyCODONE, oxyCODONE, prochlorperazine, sodium chloride 0.9%, sodium chloride 0.9%, sodium chloride 0.9%     Review of patient's allergies indicates:  No Known Allergies  Objective:     Vital Signs (Most Recent):  Temp: 99 °F (37.2 °C) (02/11/24 0701)  Pulse: 92 (02/11/24 0720)  Resp: (!) 21 (02/11/24 0720)  BP: (!) 154/67 (02/11/24 0701)  SpO2: 98 % (02/11/24 0720) Vital Signs (24h Range):  Temp:  [98.5 °F (36.9 °C)-99.6 °F (37.6 °C)] 99 °F (37.2 °C)  Pulse:  [] 92  Resp:  [15-32] 21  SpO2:  [96 %-100 %] 98 %  BP: (133-167)/(60-71) 154/67  Arterial Line BP: ()/(23-81) 147/62     Weight: (!) 153.3 kg (338 lb)  Body mass index is 56.25 kg/m².    Intake/Output - Last 3 Shifts         02/09 0700  02/10 0659 02/10 0700  02/11 0659 02/11 0700  02/12 0659    I.V. (mL/kg) 952.3  (6.2) 28.9 (0.2) 1.2 (0)    Blood  350     Other 1000      NG/GT 30 710 30    IV Piggyback 495.4 302.7     Total Intake(mL/kg) 2477.6 (16.2) 1391.7 (9.1) 31.2 (0.2)    Urine (mL/kg/hr) 615 (0.2) 525 (0.1) 25 (0.1)    Other 3150 350     Stool 100 100     Total Output 3865 975 25    Net -1387.4 +416.7 +6.2           Stool Occurrence  1 x              Physical Exam  HENT:      Head: Normocephalic and atraumatic.   Cardiovascular:      Rate and Rhythm: Normal rate and regular rhythm.   Pulmonary:      Comments: Intubated  Vent Mode: Spont  Oxygen Concentration (%):  [40] 40  Resp Rate Total:  [14 br/min-148 br/min] 20 br/min  PEEP/CPAP:  [5 cmH20] 5 cmH20  Pressure Support:  [10 cmH20] 10 cmH20  Mean Airway Pressure:  [8.5 cmH20-9.4 cmH20] 8.7 cmH20      Abdominal:      General: There is no distension.      Palpations: Abdomen is soft.      Tenderness: There is no abdominal tenderness.   Skin:     General: Skin is warm and dry.      Capillary Refill: Capillary refill takes less than 2 seconds.      Comments: Wound vac in place to R thigh/groin to suction          Significant Labs:  I have reviewed all pertinent lab results within the past 24 hours.  CBC:   Recent Labs   Lab 02/11/24  0305   WBC 12.07   RBC 2.34*   HGB 6.4*   HCT 19.3*      MCV 83   MCH 27.4   MCHC 33.2     BMP:   Recent Labs   Lab 02/11/24  0305   *   *   K 4.4      CO2 18*   BUN 51*   CREATININE 4.0*   CALCIUM 8.2*   MG 1.9       Significant Diagnostics:  I have reviewed all pertinent imaging results/findings within the past 24 hours.

## 2024-02-11 NOTE — ASSESSMENT & PLAN NOTE
Stroke Risk Factor  Endocrine following, recommendations include:  -Continue transition IV insulin infusion at 0.6 u/hr with stepdown parameters   -Continue Novolog 6 units q 4 hrs while on tube feeding (HOLD if tube feeding is stopped or BG < 100)   -Continue Moderate Dose Correction Scale  -BG monitoring q 4 hrs while NPO

## 2024-02-11 NOTE — ASSESSMENT & PLAN NOTE
BG goal 140-180  No previous hx of T2DM per family at bedside. A1c of 10.4. DKA at OSH. TF on..BG stable on regimen.  Will continue to monitor.    Plan:  - Continue transition IV insulin infusion at 0.6 u/hr with stepdown parameters   - Continue Novolog 6 units q 4 hrs while on tube feeding (HOLD if tube feeding is stopped or BG < 100)   - Continue Moderate Dose Correction Scale  - BG monitoring q 4 hrs while NPO     ** Please call Endocrine for any BG related issues **      Discharge plans: TBD    Lab Results   Component Value Date    HGBA1C 10.4 (H) 01/30/2024

## 2024-02-11 NOTE — PROGRESS NOTES
[facility]  General Surgery  Progress Note    Subjective:     History of Present Illness:  53 year old morbidly obese female who does not routinely go to the doctor presents to the ED with malaise, nausea, vomiting, and groin, buttock, perineal swelling and tenderness. She began feeling ill a few days ago.     On arrival to the ED she is tachycardic to 120, hypertensive without fever. Labs demonstrate leukocytosis of 21, , with lactic acidosis and associated ALVARADO with cr 3.8, hyperglycemia with blood glucose of 824 accompanied by severe electrolyte derangements. CT imaging obtained demonstrates diffuse subcutaneous air along buttocks, perineum, anterior vulva, as well as bilateral thighs.     No prior abdominal surgeries. She denies problems with her heart or lungs. Non smoker. Does not routinely take any medications.    Post-Op Info:  Procedure(s) (LRB):  DEBRIDEMENT, WOUND w wound vac change (Right)   2 Days Post-Op     Interval History: No acute events overnight. No changes to her neuro exam. MRI showed similar L temporal lobe lesion improved from prior, no other acute processes. HDS. Remains intubated on PS. Wound vac to suction.    Medications:  Continuous Infusions:   dextrose 10 % in water (D10W)      insulin regular 1 units/mL infusion orderable (TRANSFER) 0.6 Units/hr (02/11/24 0800)     Scheduled Meds:   sodium chloride 0.9%   Intravenous Once    amLODIPine  10 mg Per OG tube Daily    carvediloL  12.5 mg Per OG tube BID    cefTRIAXone (Rocephin) IV (PEDS and ADULTS)  2 g Intravenous Q24H    famotidine  20 mg Per OG tube Daily    heparin (porcine)  7,500 Units Subcutaneous Q8H    insulin aspart U-100  6 Units Subcutaneous Q4H    metronidazole  500 mg Intravenous Q8H    psyllium husk (aspartame)  1 packet Per OG tube BID    sevelamer carbonate  1.6 g Per OG tube TID WM     PRN Meds:0.9%  NaCl infusion (for blood administration), sodium chloride 0.9%, albuterol-ipratropium,  dextrose 10 % in water (D10W), dextrose 10%, dextrose 10%, glucagon (human recombinant), glucose, glucose, hydrALAZINE, insulin aspart U-100, labetalol, naloxone, ondansetron, oxyCODONE, oxyCODONE, prochlorperazine, sodium chloride 0.9%, sodium chloride 0.9%, sodium chloride 0.9%     Review of patient's allergies indicates:  No Known Allergies  Objective:     Vital Signs (Most Recent):  Temp: 99 °F (37.2 °C) (02/11/24 0701)  Pulse: 92 (02/11/24 0720)  Resp: (!) 21 (02/11/24 0720)  BP: (!) 154/67 (02/11/24 0701)  SpO2: 98 % (02/11/24 0720) Vital Signs (24h Range):  Temp:  [98.5 °F (36.9 °C)-99.6 °F (37.6 °C)] 99 °F (37.2 °C)  Pulse:  [] 92  Resp:  [15-32] 21  SpO2:  [96 %-100 %] 98 %  BP: (133-167)/(60-71) 154/67  Arterial Line BP: ()/(23-81) 147/62     Weight: (!) 153.3 kg (338 lb)  Body mass index is 56.25 kg/m².    Intake/Output - Last 3 Shifts         02/09 0700  02/10 0659 02/10 0700  02/11 0659 02/11 0700 02/12 0659    I.V. (mL/kg) 952.3 (6.2) 28.9 (0.2) 1.2 (0)    Blood  350     Other 1000      NG/GT 30 710 30    IV Piggyback 495.4 302.7     Total Intake(mL/kg) 2477.6 (16.2) 1391.7 (9.1) 31.2 (0.2)    Urine (mL/kg/hr) 615 (0.2) 525 (0.1) 25 (0.1)    Other 3150 350     Stool 100 100     Total Output 3865 975 25    Net -1387.4 +416.7 +6.2           Stool Occurrence  1 x              Physical Exam  HENT:      Head: Normocephalic and atraumatic.   Cardiovascular:      Rate and Rhythm: Normal rate and regular rhythm.   Pulmonary:      Comments: Intubated  Vent Mode: Spont  Oxygen Concentration (%):  [40] 40  Resp Rate Total:  [14 br/min-148 br/min] 20 br/min  PEEP/CPAP:  [5 cmH20] 5 cmH20  Pressure Support:  [10 cmH20] 10 cmH20  Mean Airway Pressure:  [8.5 cmH20-9.4 cmH20] 8.7 cmH20      Abdominal:      General: There is no distension.      Palpations: Abdomen is soft.      Tenderness: There is no abdominal tenderness.   Skin:     General: Skin is warm and dry.      Capillary Refill: Capillary refill  takes less than 2 seconds.      Comments: Wound vac in place to R thigh/groin to suction          Significant Labs:  I have reviewed all pertinent lab results within the past 24 hours.  CBC:   Recent Labs   Lab 02/11/24  0305   WBC 12.07   RBC 2.34*   HGB 6.4*   HCT 19.3*      MCV 83   MCH 27.4   MCHC 33.2     BMP:   Recent Labs   Lab 02/11/24  0305   *   *   K 4.4      CO2 18*   BUN 51*   CREATININE 4.0*   CALCIUM 8.2*   MG 1.9       Significant Diagnostics:  I have reviewed all pertinent imaging results/findings within the past 24 hours.  Assessment/Plan:     * Ayaz's gangrene  53 y.o. 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     - Last W[place] change 2/9. Plan to go back to the OR tomorrow for wound vac change, will obtain consent; please hold tube feeds at midnight in anticipation of vac change  - starting to discuss need for trach/PEG/permacath/diverting colostomy given that the family would like everything done; may plan for this late this week pending patient's clinical status and ongoing discussions with family regarding poor prognosis  - Palliative following given overall poor prognosis, daughter would like everything done; MRI slightly improved but no changes to patient's neuro status  - Daily SBTs  - Okay for DVT ppx   - Remainder of care per SICU        Beatris Parks MD  General Surgery  [facility]

## 2024-02-11 NOTE — PLAN OF CARE
SICU PLAN OF CARE NOTE    Dx: Ayaz's gangrene    Goals of Care: MAP >65, SBP <170    Vital Signs (last 12 hours):   Temp:  [98.9 °F (37.2 °C)-99.6 °F (37.6 °C)]   Pulse:  []   Resp:  [18-25]   BP: (133-167)/(60-71)   SpO2:  [97 %-99 %]   Arterial Line BP: (114-158)/(53-66)      Neuro: Unresponsive    Cardiac: NSR    Respiratory: Ventilator    Gtts: Insulin    Urine Output: Urinary Catheter 325 cc/shift    Diet: NPO and Tube Feeds     Labs/Accuchecks: accuchecks Q4    Skin: Wounds and incisions per documentation.    Shift Events:  NAEON. 1 unit of PRBC given per MD order. VSS. UOP adequate. Neuro status same.

## 2024-02-11 NOTE — PLAN OF CARE
"Please link this note to the resident's progress note. This note serves as the attestation.    In brief, Sarah Saravia is a 53 year-old woman with a history of hypertension, morbid obesity, renal insufficiency, and type 2 diabetes who was admitted as a transfer for necrotizing soft tissue infection.  She has undergone debridements for treatment.  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      Critical Care issues in this patient include:    Ayaz's gangrene              * Underwent debridement/wound vac exchange on 2/9. Plan for vac change on tomorrow. Continue rocephin and flagyl. ID consulted and are following. Appreciate their recommendations. Wound cultures growing pansensitive proteus mirabilis and Bacteroides. Blood cultures have been NGTD.      Encephalopathy, metabolic              * Initially thought to be due to sepsis and severe infection. Neurology consulted and are following. EEG done and shows severe slowing and no seizures. MRI brain done recently revealed "several scattered punctate acute infarcts throughout the bilateral frontal lobes, centrum semiovale, basal ganglia, corpus callosum, and cerebellar hemispheres" concerning for embolic source. Obtained echo to evaluate for cardiac vegetations or PFO; none noted. Consulted Vascular Neurology per Neurology (general). Appreciate their recs. CTA head and neck done and no significant occlusion noted. JAY not performed by Cardiology because they do not believe it'll change her treatment, which I disagree with. JAY done by cardiac anesthesia yesterday did not show any vegetations. MRI with contrast--discussed with rads and vasc neuro. Don't believe she needs it. Recent repeat MRI, MRA and MRV without new changes. Will discuss with neuro about getting an LP.     Acute renal failure with tubular necrosis              * ARF on CKD. Glynn replaced and she is starting to make more urine. " Keep meza in place for now. Nephrology has been consulted and are following. Appreciate recs. Failed recent lasix challenge. Last had HD on 2/9. Trialysis replaced 2/6 and functioning ok. Monitor electrolytes. Phos remains very high. On novasource for TFs and Nephro believes that the source. Unclear. Continue phos binders.     Type 2 diabetes mellitus with hyperglycemia              * Endocrinology consulted and are following. Appreciate their recs. Poorly controlled BG at baseline with recent Hgb A1c at 10.4%. Continue with hourly glucose checks and titrate insulin infusion per protocol.      Encounter for weaning the ventilator              * Remains on pressure support on the vent. Cannot extubate secondary to poor mental status. CXR and ABG pending this morning. Continue with lung protective ventilation. Keep HOB elevated 30 degrees.     Patient remains stable in the ICU. Still not waking up despite not being on sedation for over a week. Continue tube feeds. Continue fiber for diarrhea. To . Continue GI and DVT prophylaxis. Continue ICU care.    Patient remains stable. Continue tube feeds. Hold at midnight. Continue GI and DVT prophylaxis. Continue ICU care.     Critical care time spent: 36 min    Critical care was time spent personally by me on the following activities: development of treatment plan with patient or surrogate and bedside caregivers, discussions with consultants, evaluation of patient's response to treatment, examination of patient, ordering and performing treatments and interventions, ordering and review of laboratory studies, ordering and review of radiographic studies, pulse oximetry, re-evaluation of patient's condition.  This critical care time did not overlap with that of any other provider or involve time for any procedures.      Karla Connelly MD, FACS  General Surgery and Surgical Critical Care  [facility]

## 2024-02-11 NOTE — ASSESSMENT & PLAN NOTE
Transfer from [facility]. Admitted for fourniers gangrene. Initiated HD on 1/31. ALVARADO 2/2 ATN in setting of septic shock. Arrived with CR 3.8. Unknown baseline.  Plan to OR today. Net -1.3L.OR today for debridement with possible closure and wound vac placement     ALVARADO most likley ATN in setting of septic shock     Plan/Recommendation  -Plan to for 8 hours of SLED today for metabolic clearance and volume management   -Daily RFP   -Strict I&Os  -Avoid nephrotoxins, if possible

## 2024-02-11 NOTE — SUBJECTIVE & OBJECTIVE
Interval History/Significant Events: Pt seen and examined at bedside. BISHNU MULLER.     Follow-up For: Procedure(s) (LRB):  DEBRIDEMENT, WOUND w wound vac change (Right)    Post-Operative Day: 2 Days Post-Op    Objective:     Vital Signs (Most Recent):  Temp: 99 °F (37.2 °C) (02/11/24 0701)  Pulse: 92 (02/11/24 0715)  Resp: (!) 22 (02/11/24 0715)  BP: (!) 154/67 (02/11/24 0701)  SpO2: 98 % (02/11/24 0715) Vital Signs (24h Range):  Temp:  [98.5 °F (36.9 °C)-99.6 °F (37.6 °C)] 99 °F (37.2 °C)  Pulse:  [] 92  Resp:  [15-32] 22  SpO2:  [96 %-100 %] 98 %  BP: (133-167)/(60-71) 154/67  Arterial Line BP: ()/(23-81) 147/62     Weight: (!) 153.3 kg (338 lb)  Body mass index is 56.25 kg/m².      Intake/Output Summary (Last 24 hours) at 2/11/2024 0721  Last data filed at 2/11/2024 0701  Gross per 24 hour   Intake 1371.56 ml   Output 970 ml   Net 401.56 ml          Physical Exam     Vitals reviewed.   Constitutional:       Appearance: She is obese.   HENT:      Head: Normocephalic.      Mouth/Throat:      Mouth: Mucous membranes are moist.   Eyes:      Comments: Bilateral R gaze   Neck:      Comments: RIJ Trialysis  Cardiovascular:      Rate and Rhythm: Normal rate and regular rhythm.   Pulmonary:      Effort: Pulmonary effort is normal.      Comments: Intubated  Abdominal:      General: Abdomen is flat.      Palpations: Abdomen is soft.   Genitourinary:     Comments: R groin wound   Glynn in place  Rectal tube  Musculoskeletal:      Right lower leg: Edema present.      Left lower leg: Edema present.      LUE: Edema present     Comments: R radial A-line   Skin:     General: Skin is warm.   Neurological:      Comments: Opens eyes to pain but does not withdraw or track.    Vents:    Vent Mode: Spont (02/11/24 0406)  Set Rate: 18 BPM (02/06/24 0349)  Vt Set: 420 mL (02/06/24 0349)  Pressure Support: 10 cmH20 (02/11/24 0406)  PEEP/CPAP: 5 cmH20 (02/11/24 0406)  Oxygen Concentration (%): 40 (02/11/24 0715)  Peak Airway  Pressure: 16 cmH20 (02/11/24 0406)  Plateau Pressure: 15 cmH20 (02/11/24 0406)  Total Ve: 10.5 L/m (02/11/24 0406)  Negative Inspiratory Force (cm H2O): 0 (02/11/24 0406)  F/VT Ratio<105 (RSBI): (!) 43.86 (02/11/24 0406)    Lines/Drains/Airways       Central Venous Catheter Line  Duration             Trialysis (Dialysis) Catheter 02/06/24 1345 right internal jugular 4 days              Drain  Duration                  Rectal Tube 02/04/24 1545 6 days         NG/OG Tube 02/06/24 1030 Center mouth 4 days         Urethral Catheter 02/06/24 1900 4 days              Airway  Duration                  Airway - Non-Surgical 01/31/24 1020 10 days              Arterial Line  Duration             Arterial Line 01/31/24 1025 Right Radial 10 days              Peripheral Intravenous Line  Duration                  Peripheral IV - Single Lumen 02/08/24 0152 18 G Anterior;Left Forearm 3 days         Peripheral IV - Single Lumen 02/09/24 0130 18 G Anterior;Right Forearm 2 days                    Significant Labs:    CBC/Anemia Profile:  Recent Labs   Lab 02/10/24  0333 02/11/24  0305   WBC 15.42* 12.07   HGB 7.0* 6.4*   HCT 21.4* 19.3*    408   MCV 82 83   RDW 18.1* 18.2*        Chemistries:  Recent Labs   Lab 02/09/24  1312 02/10/24  0333 02/11/24  0305   * 133*  133* 133*   K 4.2 4.3  4.3 4.4    104  104 105   CO2 19* 17*  17* 18*   BUN 37* 41*  41* 51*   CREATININE 3.3* 3.5*  3.5* 4.0*   CALCIUM 7.9* 8.1*  8.1* 8.2*   ALBUMIN 1.7* 2.1*  2.1* 1.8*   PROT  --  7.0  7.0 6.7   BILITOT  --  0.3  0.3 0.2   ALKPHOS  --  132  132 114   ALT  --  11  11 13   AST  --  27  27 32   MG  --  1.9 1.9   PHOS 6.5* 7.7* 8.4*       Significant Imaging:  I have reviewed all pertinent imaging results/findings within the past 24 hours.

## 2024-02-11 NOTE — PROGRESS NOTES
02/11/24 1754   Treatment   Treatment Type SLED   Treatment Status Clotting;Blood returned   Dialyzer Time (hours) 2.34   BVP (Liters) 22.3 L   CRRT Comments CRRT clotted, rinsed back per primary rn

## 2024-02-12 ENCOUNTER — ANESTHESIA (OUTPATIENT)
Dept: SURGERY | Facility: HOSPITAL | Age: 54
DRG: 004 | End: 2024-02-12
Payer: MEDICAID

## 2024-02-12 LAB
ALBUMIN SERPL BCP-MCNC: 1.8 G/DL (ref 3.5–5.2)
ALBUMIN SERPL BCP-MCNC: 1.9 G/DL (ref 3.5–5.2)
ALP SERPL-CCNC: 132 U/L (ref 55–135)
ALT SERPL W/O P-5'-P-CCNC: 14 U/L (ref 10–44)
ANION GAP SERPL CALC-SCNC: 7 MMOL/L (ref 8–16)
ANION GAP SERPL CALC-SCNC: 8 MMOL/L (ref 8–16)
AST SERPL-CCNC: 31 U/L (ref 10–40)
BASOPHILS # BLD AUTO: 0.05 K/UL (ref 0–0.2)
BASOPHILS NFR BLD: 0.4 % (ref 0–1.9)
BILIRUB SERPL-MCNC: 0.3 MG/DL (ref 0.1–1)
BUN SERPL-MCNC: 28 MG/DL (ref 6–20)
BUN SERPL-MCNC: 28 MG/DL (ref 6–20)
BUN SERPL-MCNC: 36 MG/DL (ref 6–20)
BUN SERPL-MCNC: 36 MG/DL (ref 6–20)
CA-I BLDV-SCNC: 1.04 MMOL/L (ref 1.06–1.42)
CA-I BLDV-SCNC: 1.08 MMOL/L (ref 1.06–1.42)
CALCIUM SERPL-MCNC: 8 MG/DL (ref 8.7–10.5)
CALCIUM SERPL-MCNC: 8.2 MG/DL (ref 8.7–10.5)
CALCIUM SERPL-MCNC: 8.5 MG/DL (ref 8.7–10.5)
CALCIUM SERPL-MCNC: 8.5 MG/DL (ref 8.7–10.5)
CHLORIDE SERPL-SCNC: 103 MMOL/L (ref 95–110)
CHLORIDE SERPL-SCNC: 105 MMOL/L (ref 95–110)
CO2 SERPL-SCNC: 22 MMOL/L (ref 23–29)
CO2 SERPL-SCNC: 23 MMOL/L (ref 23–29)
CREAT SERPL-MCNC: 1.9 MG/DL (ref 0.5–1.4)
CREAT SERPL-MCNC: 1.9 MG/DL (ref 0.5–1.4)
CREAT SERPL-MCNC: 2.6 MG/DL (ref 0.5–1.4)
CREAT SERPL-MCNC: 2.6 MG/DL (ref 0.5–1.4)
DIFFERENTIAL METHOD BLD: ABNORMAL
EOSINOPHIL # BLD AUTO: 0.2 K/UL (ref 0–0.5)
EOSINOPHIL NFR BLD: 1.9 % (ref 0–8)
ERYTHROCYTE [DISTWIDTH] IN BLOOD BY AUTOMATED COUNT: 17.2 % (ref 11.5–14.5)
EST. GFR  (NO RACE VARIABLE): 21.4 ML/MIN/1.73 M^2
EST. GFR  (NO RACE VARIABLE): 21.4 ML/MIN/1.73 M^2
EST. GFR  (NO RACE VARIABLE): 31.2 ML/MIN/1.73 M^2
EST. GFR  (NO RACE VARIABLE): 31.2 ML/MIN/1.73 M^2
GLUCOSE SERPL-MCNC: 128 MG/DL (ref 70–110)
GLUCOSE SERPL-MCNC: 168 MG/DL (ref 70–110)
GLUCOSE SERPL-MCNC: 168 MG/DL (ref 70–110)
GLUCOSE SERPL-MCNC: 176 MG/DL (ref 70–110)
HCT VFR BLD AUTO: 24.2 % (ref 37–48.5)
HGB BLD-MCNC: 7.8 G/DL (ref 12–16)
IMM GRANULOCYTES # BLD AUTO: 0.26 K/UL (ref 0–0.04)
IMM GRANULOCYTES NFR BLD AUTO: 2.1 % (ref 0–0.5)
LYMPHOCYTES # BLD AUTO: 1.8 K/UL (ref 1–4.8)
LYMPHOCYTES NFR BLD: 14.3 % (ref 18–48)
MAGNESIUM SERPL-MCNC: 1.8 MG/DL (ref 1.6–2.6)
MAGNESIUM SERPL-MCNC: 2 MG/DL (ref 1.6–2.6)
MCH RBC QN AUTO: 27 PG (ref 27–31)
MCHC RBC AUTO-ENTMCNC: 32.2 G/DL (ref 32–36)
MCV RBC AUTO: 84 FL (ref 82–98)
MONOCYTES # BLD AUTO: 1.4 K/UL (ref 0.3–1)
MONOCYTES NFR BLD: 11.3 % (ref 4–15)
NEUTROPHILS # BLD AUTO: 8.8 K/UL (ref 1.8–7.7)
NEUTROPHILS NFR BLD: 70 % (ref 38–73)
NRBC BLD-RTO: 0 /100 WBC
PHOSPHATE SERPL-MCNC: 3.7 MG/DL (ref 2.7–4.5)
PHOSPHATE SERPL-MCNC: 3.8 MG/DL (ref 2.7–4.5)
PHOSPHATE SERPL-MCNC: 5.9 MG/DL (ref 2.7–4.5)
PHOSPHATE SERPL-MCNC: 5.9 MG/DL (ref 2.7–4.5)
PLATELET # BLD AUTO: 380 K/UL (ref 150–450)
PMV BLD AUTO: 9.3 FL (ref 9.2–12.9)
POCT GLUCOSE: 130 MG/DL (ref 70–110)
POCT GLUCOSE: 139 MG/DL (ref 70–110)
POCT GLUCOSE: 142 MG/DL (ref 70–110)
POCT GLUCOSE: 162 MG/DL (ref 70–110)
POCT GLUCOSE: 170 MG/DL (ref 70–110)
POCT GLUCOSE: 187 MG/DL (ref 70–110)
POTASSIUM SERPL-SCNC: 4.3 MMOL/L (ref 3.5–5.1)
POTASSIUM SERPL-SCNC: 4.8 MMOL/L (ref 3.5–5.1)
PROT SERPL-MCNC: 7.3 G/DL (ref 6–8.4)
RBC # BLD AUTO: 2.89 M/UL (ref 4–5.4)
SODIUM SERPL-SCNC: 134 MMOL/L (ref 136–145)
WBC # BLD AUTO: 12.59 K/UL (ref 3.9–12.7)

## 2024-02-12 PROCEDURE — 80069 RENAL FUNCTION PANEL: CPT | Mod: 91 | Performed by: STUDENT IN AN ORGANIZED HEALTH CARE EDUCATION/TRAINING PROGRAM

## 2024-02-12 PROCEDURE — 20000000 HC ICU ROOM

## 2024-02-12 PROCEDURE — 25000003 PHARM REV CODE 250: Performed by: STUDENT IN AN ORGANIZED HEALTH CARE EDUCATION/TRAINING PROGRAM

## 2024-02-12 PROCEDURE — 63600175 PHARM REV CODE 636 W HCPCS

## 2024-02-12 PROCEDURE — 83735 ASSAY OF MAGNESIUM: CPT | Performed by: STUDENT IN AN ORGANIZED HEALTH CARE EDUCATION/TRAINING PROGRAM

## 2024-02-12 PROCEDURE — 63600175 PHARM REV CODE 636 W HCPCS: Performed by: NURSE ANESTHETIST, CERTIFIED REGISTERED

## 2024-02-12 PROCEDURE — 83735 ASSAY OF MAGNESIUM: CPT | Mod: 91 | Performed by: STUDENT IN AN ORGANIZED HEALTH CARE EDUCATION/TRAINING PROGRAM

## 2024-02-12 PROCEDURE — 99232 SBSQ HOSP IP/OBS MODERATE 35: CPT | Mod: ,,, | Performed by: PHYSICIAN ASSISTANT

## 2024-02-12 PROCEDURE — 25000003 PHARM REV CODE 250: Performed by: PHYSICIAN ASSISTANT

## 2024-02-12 PROCEDURE — 94761 N-INVAS EAR/PLS OXIMETRY MLT: CPT

## 2024-02-12 PROCEDURE — 80053 COMPREHEN METABOLIC PANEL: CPT

## 2024-02-12 PROCEDURE — 37000009 HC ANESTHESIA EA ADD 15 MINS: Performed by: SURGERY

## 2024-02-12 PROCEDURE — 63600175 PHARM REV CODE 636 W HCPCS: Mod: JZ,JG | Performed by: STUDENT IN AN ORGANIZED HEALTH CARE EDUCATION/TRAINING PROGRAM

## 2024-02-12 PROCEDURE — 99233 SBSQ HOSP IP/OBS HIGH 50: CPT | Mod: ,,, | Performed by: STUDENT IN AN ORGANIZED HEALTH CARE EDUCATION/TRAINING PROGRAM

## 2024-02-12 PROCEDURE — 84100 ASSAY OF PHOSPHORUS: CPT | Performed by: STUDENT IN AN ORGANIZED HEALTH CARE EDUCATION/TRAINING PROGRAM

## 2024-02-12 PROCEDURE — 27100171 HC OXYGEN HIGH FLOW UP TO 24 HOURS

## 2024-02-12 PROCEDURE — D9220A PRA ANESTHESIA: Mod: CRNA,,, | Performed by: NURSE ANESTHETIST, CERTIFIED REGISTERED

## 2024-02-12 PROCEDURE — 37000008 HC ANESTHESIA 1ST 15 MINUTES: Performed by: SURGERY

## 2024-02-12 PROCEDURE — 25000242 PHARM REV CODE 250 ALT 637 W/ HCPCS

## 2024-02-12 PROCEDURE — 99291 CRITICAL CARE FIRST HOUR: CPT | Mod: 24,,, | Performed by: STUDENT IN AN ORGANIZED HEALTH CARE EDUCATION/TRAINING PROGRAM

## 2024-02-12 PROCEDURE — 36000706: Performed by: SURGERY

## 2024-02-12 PROCEDURE — 36000707: Performed by: SURGERY

## 2024-02-12 PROCEDURE — 97597 DBRDMT OPN WND 1ST 20 CM/<: CPT | Mod: ,,, | Performed by: SURGERY

## 2024-02-12 PROCEDURE — 63600175 PHARM REV CODE 636 W HCPCS: Mod: JZ,JG | Performed by: INTERNAL MEDICINE

## 2024-02-12 PROCEDURE — 27000923 HC TRIALYSIS CATHETER, ANY SIZE

## 2024-02-12 PROCEDURE — 93010 ELECTROCARDIOGRAM REPORT: CPT | Mod: ,,, | Performed by: INTERNAL MEDICINE

## 2024-02-12 PROCEDURE — 99900026 HC AIRWAY MAINTENANCE (STAT)

## 2024-02-12 PROCEDURE — 99900035 HC TECH TIME PER 15 MIN (STAT)

## 2024-02-12 PROCEDURE — 63600175 PHARM REV CODE 636 W HCPCS: Performed by: STUDENT IN AN ORGANIZED HEALTH CARE EDUCATION/TRAINING PROGRAM

## 2024-02-12 PROCEDURE — 82330 ASSAY OF CALCIUM: CPT | Performed by: STUDENT IN AN ORGANIZED HEALTH CARE EDUCATION/TRAINING PROGRAM

## 2024-02-12 PROCEDURE — 85025 COMPLETE CBC W/AUTO DIFF WBC: CPT

## 2024-02-12 PROCEDURE — 63600175 PHARM REV CODE 636 W HCPCS: Performed by: PHYSICIAN ASSISTANT

## 2024-02-12 PROCEDURE — 25000003 PHARM REV CODE 250: Performed by: NURSE ANESTHETIST, CERTIFIED REGISTERED

## 2024-02-12 PROCEDURE — 63600175 PHARM REV CODE 636 W HCPCS: Performed by: HOSPITALIST

## 2024-02-12 PROCEDURE — 90945 DIALYSIS ONE EVALUATION: CPT

## 2024-02-12 PROCEDURE — 99024 POSTOP FOLLOW-UP VISIT: CPT | Mod: ,,, | Performed by: STUDENT IN AN ORGANIZED HEALTH CARE EDUCATION/TRAINING PROGRAM

## 2024-02-12 PROCEDURE — 99233 SBSQ HOSP IP/OBS HIGH 50: CPT | Mod: ,,, | Performed by: INTERNAL MEDICINE

## 2024-02-12 PROCEDURE — 25000003 PHARM REV CODE 250

## 2024-02-12 PROCEDURE — 94003 VENT MGMT INPAT SUBQ DAY: CPT

## 2024-02-12 PROCEDURE — D9220A PRA ANESTHESIA: Mod: ANES,,, | Performed by: ANESTHESIOLOGY

## 2024-02-12 PROCEDURE — 93005 ELECTROCARDIOGRAM TRACING: CPT

## 2024-02-12 PROCEDURE — 27201423 OPTIME MED/SURG SUP & DEVICES STERILE SUPPLY: Performed by: SURGERY

## 2024-02-12 RX ORDER — MIDAZOLAM HYDROCHLORIDE 1 MG/ML
INJECTION, SOLUTION INTRAMUSCULAR; INTRAVENOUS
Status: DISCONTINUED | OUTPATIENT
Start: 2024-02-12 | End: 2024-02-12

## 2024-02-12 RX ORDER — MAGNESIUM SULFATE HEPTAHYDRATE 40 MG/ML
2 INJECTION, SOLUTION INTRAVENOUS
Status: DISPENSED | OUTPATIENT
Start: 2024-02-12 | End: 2024-02-13

## 2024-02-12 RX ORDER — MAGNESIUM SULFATE HEPTAHYDRATE 40 MG/ML
2 INJECTION, SOLUTION INTRAVENOUS
Status: DISCONTINUED | OUTPATIENT
Start: 2024-02-12 | End: 2024-02-12

## 2024-02-12 RX ORDER — KETAMINE HCL IN 0.9 % NACL 50 MG/5 ML
SYRINGE (ML) INTRAVENOUS
Status: DISCONTINUED | OUTPATIENT
Start: 2024-02-12 | End: 2024-02-12

## 2024-02-12 RX ORDER — VECURONIUM BROMIDE FOR INJECTION 1 MG/ML
INJECTION, POWDER, LYOPHILIZED, FOR SOLUTION INTRAVENOUS
Status: DISCONTINUED | OUTPATIENT
Start: 2024-02-12 | End: 2024-02-12

## 2024-02-12 RX ORDER — CARVEDILOL 25 MG/1
25 TABLET ORAL 2 TIMES DAILY
Status: DISCONTINUED | OUTPATIENT
Start: 2024-02-12 | End: 2024-02-22

## 2024-02-12 RX ORDER — FENTANYL CITRATE 50 UG/ML
INJECTION, SOLUTION INTRAMUSCULAR; INTRAVENOUS
Status: DISCONTINUED | OUTPATIENT
Start: 2024-02-12 | End: 2024-02-12

## 2024-02-12 RX ORDER — HYDROCODONE BITARTRATE AND ACETAMINOPHEN 500; 5 MG/1; MG/1
TABLET ORAL CONTINUOUS
Status: DISCONTINUED | OUTPATIENT
Start: 2024-02-12 | End: 2024-02-12

## 2024-02-12 RX ADMIN — CALCIUM GLUCONATE: 98 INJECTION, SOLUTION INTRAVENOUS at 07:02

## 2024-02-12 RX ADMIN — INSULIN ASPART 6 UNITS: 100 INJECTION, SOLUTION INTRAVENOUS; SUBCUTANEOUS at 07:02

## 2024-02-12 RX ADMIN — PSYLLIUM HUSK 1 PACKET: 3.4 POWDER ORAL at 08:02

## 2024-02-12 RX ADMIN — HEPARIN SODIUM 7500 UNITS: 5000 INJECTION INTRAVENOUS; SUBCUTANEOUS at 05:02

## 2024-02-12 RX ADMIN — HEPARIN SODIUM 7500 UNITS: 5000 INJECTION INTRAVENOUS; SUBCUTANEOUS at 03:02

## 2024-02-12 RX ADMIN — CARVEDILOL 25 MG: 25 TABLET, FILM COATED ORAL at 09:02

## 2024-02-12 RX ADMIN — VECURONIUM BROMIDE 5 MG: 10 INJECTION, POWDER, LYOPHILIZED, FOR SOLUTION INTRAVENOUS at 01:02

## 2024-02-12 RX ADMIN — Medication 25 MG: at 02:02

## 2024-02-12 RX ADMIN — CEFTRIAXONE 2 G: 2 INJECTION, POWDER, FOR SOLUTION INTRAMUSCULAR; INTRAVENOUS at 09:02

## 2024-02-12 RX ADMIN — INSULIN ASPART 1 UNITS: 100 INJECTION, SOLUTION INTRAVENOUS; SUBCUTANEOUS at 07:02

## 2024-02-12 RX ADMIN — VECURONIUM BROMIDE 3 MG: 10 INJECTION, POWDER, LYOPHILIZED, FOR SOLUTION INTRAVENOUS at 02:02

## 2024-02-12 RX ADMIN — HEPARIN SODIUM 7500 UNITS: 5000 INJECTION INTRAVENOUS; SUBCUTANEOUS at 09:02

## 2024-02-12 RX ADMIN — SODIUM CHLORIDE, SODIUM ACETATE ANHYDROUS, SODIUM GLUCONATE, POTASSIUM CHLORIDE, AND MAGNESIUM CHLORIDE: 526; 222; 502; 37; 30 INJECTION, SOLUTION INTRAVENOUS at 01:02

## 2024-02-12 RX ADMIN — SEVELAMER CARBONATE 1.6 G: 800 POWDER, FOR SUSPENSION ORAL at 05:02

## 2024-02-12 RX ADMIN — PSYLLIUM HUSK 1 PACKET: 3.4 POWDER ORAL at 09:02

## 2024-02-12 RX ADMIN — MIDAZOLAM HYDROCHLORIDE 2 MG: 1 INJECTION, SOLUTION INTRAMUSCULAR; INTRAVENOUS at 01:02

## 2024-02-12 RX ADMIN — FENTANYL CITRATE 50 MCG: 50 INJECTION, SOLUTION INTRAMUSCULAR; INTRAVENOUS at 02:02

## 2024-02-12 RX ADMIN — FAMOTIDINE 20 MG: 20 TABLET, FILM COATED ORAL at 09:02

## 2024-02-12 RX ADMIN — SEVELAMER CARBONATE 1.6 G: 800 POWDER, FOR SUSPENSION ORAL at 12:02

## 2024-02-12 RX ADMIN — AMLODIPINE BESYLATE 10 MG: 10 TABLET ORAL at 09:02

## 2024-02-12 RX ADMIN — HYDRALAZINE HYDROCHLORIDE 10 MG: 20 INJECTION, SOLUTION INTRAMUSCULAR; INTRAVENOUS at 03:02

## 2024-02-12 RX ADMIN — INSULIN DETEMIR 12 UNITS: 100 INJECTION, SOLUTION SUBCUTANEOUS at 09:02

## 2024-02-12 RX ADMIN — METRONIDAZOLE 500 MG: 5 INJECTION, SOLUTION INTRAVENOUS at 05:02

## 2024-02-12 RX ADMIN — METRONIDAZOLE 500 MG: 5 INJECTION, SOLUTION INTRAVENOUS at 10:02

## 2024-02-12 RX ADMIN — CARVEDILOL 25 MG: 25 TABLET, FILM COATED ORAL at 08:02

## 2024-02-12 RX ADMIN — MAGNESIUM SULFATE HEPTAHYDRATE 2 G: 40 INJECTION, SOLUTION INTRAVENOUS at 12:02

## 2024-02-12 RX ADMIN — METRONIDAZOLE 500 MG: 5 INJECTION, SOLUTION INTRAVENOUS at 03:02

## 2024-02-12 RX ADMIN — VECURONIUM BROMIDE 2 MG: 10 INJECTION, POWDER, LYOPHILIZED, FOR SOLUTION INTRAVENOUS at 02:02

## 2024-02-12 RX ADMIN — SEVELAMER CARBONATE 1.6 G: 800 POWDER, FOR SUSPENSION ORAL at 09:02

## 2024-02-12 RX ADMIN — DEXTROSE MONOHYDRATE, SODIUM CITRATE, AND CITRIC ACID MONOHYDRATE: 2.45; 2.2; .8 INJECTION, SOLUTION INTRAVENOUS at 07:02

## 2024-02-12 NOTE — PROGRESS NOTES
Wound vac alarming negative pressure alarm and blockage,interventions carried out to maintain wound vac but alarms continued. Dr. Ayala notified, wound vac turned off momentarily until patient returns to OR today.

## 2024-02-12 NOTE — PLAN OF CARE
"      SICU PLAN OF CARE NOTE    Dx: Ayaz's gangrene    Shift Events: VSS, throughout shift. CRRT started, pt. Clotted off after two hours but able to rinse back both sides. Plan to go to OR chris for wound  vac exchange, also plan for lumbar puncture.     Goals of Care: MAP > 65, SBP < 170    Neuro: Arouses to pain, opens eyes spontaneously     Vital Signs: BP (!) 173/76   Pulse 97   Temp 98.7 °F (37.1 °C) (Oral)   Resp (!) 25   Ht 5' 5" (1.651 m)   Wt (!) 153.3 kg (338 lb)   LMP 01/01/2024 (Approximate) Comment: tubal ligation  SpO2 98%   BMI 56.25 kg/m²     Respiratory: Ventilator    Diet: TF @ 30      Gtts: Insulin    Urine Output: Urinary Catheter 425 cc/shift    Drains:     Wound Vac 150 cc/shift    BMS 0 cc/shift    Labs/Accuchecks: Daily labs, Q4hr accucheck.    Skin: Specialty bed plugged in, heel boots on. Patient turned q2hrs. No evidence of new skin breakdown throughout shift. Wound vac seal intact.       "

## 2024-02-12 NOTE — ASSESSMENT & PLAN NOTE
Transfer from [facility]. Admitted for fourniers gangrene. Initiated HD on 1/31. ALVARADO 2/2 ATN in setting of septic shock. Arrived with CR 3.8. Unknown baseline.  Plan to OR today. Net -1.3L.OR today for debridement with possible closure and wound vac placement     ALVARADO most likley ATN in setting of septic shock     Plan/Recommendation  -8 hour SCUF tonight for volume removal   -Daily RFP   -Strict I&Os  -Avoid nephrotoxins, if possible

## 2024-02-12 NOTE — PROGRESS NOTES
"[facility] - [facility]ensive [facility]  Endocrinology  Progress Note    Admit Date: 1/29/2024     Reason for Consult: Management of T2DM, Hyperglycemia     Surgical Procedure and Date: n/a    Diabetes diagnosis year: new onset     Home Diabetes Medications:  none per chart review and family present at bedside     Lab Results   Component Value Date    HGBA1C 10.4 (H) 01/30/2024       Diabetes Complications include:     Hyperglycemia, DKA at OSH     Complicating diabetes co morbidities:   Obesity       HPI:   Patient is a 53 y.o. female with a diagnosis of obesity (BMI 53) admitted with N/V and R groin wound found to be in DKA at OSH. 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 While at OSH pt developed acute respiratory failure requiring intubation. Pulmonology was consulted for ventilator management and critical illness. Nephrology was consulted for worsening vishal in the setting of lactic acidosis and septic shock likely ATN, sCr elevated at 3.8 on admit now 4.0 post HD. Pt being admitted to SICU for surgical critical care management. Immediate plans include hemodynamic stabilization, weaning of respiratory support, and RRT.  Endocrinology consulted for management of T2DM.                 Interval HPI:   Noacute events overnight. Patient in room 19684/94970 A. Blood glucose stable. BG at goal on current insulin regimen (Transition Insulin Drip). Steroid use- None .   Renal function- Abnormal -   Vasopressors-  None      Diet NPO      Eating:   NPO  Nausea: No  Hypoglycemia and intervention: No  Fever: No  TPN and/or TF: Yes 30 cc/hr (on hold for procedure)    BP (!) 173/76   Pulse 97   Temp 98.7 °F (37.1 °C) (Oral)   Resp (!) 25   Ht 5' 5" (1.651 m)   Wt (!) 153.3 kg (338 lb)   LMP 01/01/2024 (Approximate) Comment: tubal ligation  SpO2 98%   BMI 56.25 kg/m²     Labs Reviewed and Include    Recent Labs   Lab " "02/12/24  0252   *   CALCIUM 8.2*   ALBUMIN 1.9*   PROT 7.3   *   K 4.3   CO2 23      BUN 28*   CREATININE 1.9*   ALKPHOS 132   ALT 14   AST 31   BILITOT 0.3     Lab Results   Component Value Date    WBC 12.59 02/12/2024    HGB 7.8 (L) 02/12/2024    HCT 24.2 (L) 02/12/2024    MCV 84 02/12/2024     02/12/2024     No results for input(s): "TSH", "FREET4" in the last 168 hours.  Lab Results   Component Value Date    HGBA1C 10.4 (H) 01/30/2024       Nutritional status:   Body mass index is 56.25 kg/m².  Lab Results   Component Value Date    ALBUMIN 1.9 (L) 02/12/2024    ALBUMIN 1.8 (L) 02/11/2024    ALBUMIN 1.9 (L) 02/11/2024     No results found for: "PREALBUMIN"    Estimated Creatinine Clearance: 51.6 mL/min (A) (based on SCr of 1.9 mg/dL (H)).    Accu-Checks  Recent Labs     02/11/24  0009 02/11/24  0307 02/11/24  0309 02/11/24  0721 02/11/24  1122 02/11/24  1538 02/11/24  1937 02/11/24  2336 02/12/24  0308 02/12/24  0711   POCTGLUCOSE 206* 59* 165* 159* 206* 153* 135* 165* 130* 139*       Current Medications and/or Treatments Impacting Glycemic Control  Immunotherapy:    Immunosuppressants       None          Steroids:   Hormones (From admission, onward)      None          Pressors:    Autonomic Drugs (From admission, onward)      None          Hyperglycemia/Diabetes Medications:   Antihyperglycemics (From admission, onward)      Start     Stop Route Frequency Ordered    02/12/24 0900  insulin detemir U-100 (Levemir) pen 12 Units         -- SubQ Daily 02/12/24 0850    02/09/24 1200  insulin aspart U-100 pen 6 Units         -- SubQ Every 4 hours 02/09/24 0901    02/03/24 1010  insulin aspart U-100 pen 0-10 Units         -- SubQ Every 4 hours PRN 02/03/24 0911            ASSESSMENT and PLAN    Renal/  * Ayaz's gangrene  Managed per primary team  Optimize BG control        ALVARADO (acute kidney injury)  Lab Results   Component Value Date    CREATININE 1.9 (H) 02/12/2024     Avoid insulin " stacking  Titrate insulin slowly       Endocrine  New onset type 2 diabetes mellitus  BG goal 140-180  No previous hx of T2DM per family at bedside. A1c of 10.4. DKA at OSH. TF on..BG stable on regimen.  Will transition to Levemir from transition drip at critical care's request.    Plan:  - Discontinue transition IV insulin infusion  - Start on Levemir 12 units daily.  - Continue Novolog 6 units q 4 hrs while on tube feeding (HOLD if tube feeding is stopped or BG < 100)   - Continue Moderate Dose Correction Scale  - BG monitoring q 4 hrs while NPO /TF    ** Please call Endocrine for any BG related issues **      Discharge plans: TBD    Lab Results   Component Value Date    HGBA1C 10.4 (H) 01/30/2024             Salvador Alfaro PA-C  Endocrinology  [facility]

## 2024-02-12 NOTE — PLAN OF CARE
Recommendation/Intervention:   1) Resume TF once able postop- Novasource renal @ 30 mL/hr to provide 1440 kcal, 65g PRO, and 516ml FF- FWF per MD   2) If dialysis resumes, recommend changing formula to higher PRO formula - Impact Peptide 1.5 at goal rate of 40ml/hr to provide 1440 kcal, 90g PRO, and 739ml FF (100% of EEN and 79% of EPN)  3) RD team to monitor tolerance, skin, labs, wt and follow-up as needed     Goals: Meet % EEN/EPN by follow-up date.  Nutrition Goal Status: progressing towards goal  Communication of RD Recs:  (POC)

## 2024-02-12 NOTE — SUBJECTIVE & OBJECTIVE
Interval History: no acute events overnight. Tolerated SLED without issues    Review of patient's allergies indicates:  No Known Allergies  Current Facility-Administered Medications   Medication Frequency    0.9%  NaCl infusion (CRRT USE ONLY) Continuous    0.9%  NaCl infusion (for blood administration) Q24H PRN    0.9%  NaCl infusion PRN    0.9%  NaCl infusion Once    albuterol-ipratropium 2.5 mg-0.5 mg/3 mL nebulizer solution 3 mL Q4H PRN    amLODIPine tablet 10 mg Daily    carvediloL tablet 25 mg BID    cefTRIAXone (ROCEPHIN) 2 g in dextrose 5 % in water (D5W) 100 mL IVPB (MB+) Q24H    dextrose 10 % infusion Continuous PRN    dextrose 10% bolus 125 mL 125 mL PRN    dextrose 10% bolus 250 mL 250 mL PRN    famotidine tablet 20 mg Daily    glucagon (human recombinant) injection 1 mg PRN    glucose chewable tablet 16 g PRN    glucose chewable tablet 24 g PRN    heparin (porcine) injection 7,500 Units Q8H    hydrALAZINE injection 10 mg Q4H PRN    insulin aspart U-100 pen 0-10 Units Q4H PRN    insulin aspart U-100 pen 6 Units Q4H    insulin detemir U-100 (Levemir) pen 12 Units Daily    labetalol 20 mg/4 mL (5 mg/mL) IV syring Q6H PRN    magnesium sulfate 2g in water 50mL IVPB (premix) PRN    metronidazole IVPB 500 mg Q8H    naloxone 0.4 mg/mL injection 0.02 mg PRN    ondansetron injection 4 mg Q6H PRN    oxyCODONE 5 mg/5 mL solution 10 mg Q6H PRN    oxyCODONE 5 mg/5 mL solution 5 mg Q6H PRN    prochlorperazine injection Soln 5 mg Q6H PRN    psyllium husk (aspartame) 3.4 gram packet 1 packet BID    sevelamer carbonate pwpk 1.6 g TID WM    sodium chloride 0.9% bolus 250 mL 250 mL PRN    sodium chloride 0.9% bolus 250 mL 250 mL PRN    sodium chloride 0.9% flush 10 mL PRN    sodium phosphate 20.01 mmol in dextrose 5 % (D5W) 250 mL IVPB PRN    sodium phosphate 30 mmol in dextrose 5 % (D5W) 250 mL IVPB PRN    sodium phosphate 39.99 mmol in dextrose 5 % (D5W) 250 mL IVPB PRN       Objective:     Vital Signs (Most  Recent):  Temp: 98.7 °F (37.1 °C) (02/12/24 0701)  Pulse: 97 (02/12/24 0815)  Resp: (!) 25 (02/12/24 0815)  BP: (!) 173/76 (02/12/24 0815)  SpO2: 98 % (02/12/24 0815) Vital Signs (24h Range):  Temp:  [98.5 °F (36.9 °C)-99.1 °F (37.3 °C)] 98.7 °F (37.1 °C)  Pulse:  [] 97  Resp:  [16-29] 25  SpO2:  [97 %-99 %] 98 %  BP: (130-178)/(56-78) 173/76     Weight: (!) 153.3 kg (338 lb) (02/08/24 0300)  Body mass index is 56.25 kg/m².  Body surface area is 2.65 meters squared.    I/O last 3 completed shifts:  In: 3936.3 [I.V.:2268.6; Blood:350; NG/GT:900; IV Piggyback:417.8]  Out: 3215 [Urine:1070; Other:2095; Stool:50]     Physical Exam  Vitals and nursing note reviewed.   Constitutional:       General: She is not in acute distress.     Appearance: She is obese. She is not ill-appearing or toxic-appearing.      Interventions: She is sedated and intubated.   Eyes:      General: No scleral icterus.        Right eye: No discharge.         Left eye: No discharge.   Cardiovascular:      Rate and Rhythm: Normal rate.   Pulmonary:      Effort: No respiratory distress. She is intubated.      Comments:       Abdominal:      General: There is distension.   Musculoskeletal:      Right lower leg: Edema present.      Left lower leg: Edema present.          Significant Labs:  All labs within the past 24 hours have been reviewed.     Significant Imaging:  Labs: Reviewed

## 2024-02-12 NOTE — PROGRESS NOTES
02/11/24 1805   Treatment   Treatment Type SLED   Treatment Status Restart   Dialysis Machine Number [id number]   Solutions Labeled and Current  Yes   Access Temporary Cath;Right;IJ   Catheter Dressing Intact  Yes   Alarms Engaged Yes   CRRT Comments CRRT restarted without issue

## 2024-02-12 NOTE — ASSESSMENT & PLAN NOTE
Neuro/Psych:   #Acute Encephalopathy  CTH 2/3 with scattered hypoattenuation likely representing age indeterminate infarcts. EEG findings consistent with moderate-severe encephalopathy. MRI 2/6: Several scattered punctate acute infarcts throughout the bilateral frontal lobes, centrum semiovale, basal ganglia, corpus callosum, and cerebellar hemispheres.  CTA 2/6: Atherosclerotic plaquing of the carotid bifurcations and proximal ICAs with less than 50% proximal ICA stenosis by NASCET criteria . Repeat CTH and MRI/MRA unchanged from prior exams. Has currently been wound vac/debridement x3 and due for OR this AM.    Neuro/Psych  Repeat CTH and MRI/MRA stable from initial reads.   -- Sedation: None  -- Pain: kermit tylenol, dialudid and oxy prn  -- GCS 7T: E2, V1T, M4; exam stable  -- Neurology following; appreciate recs  -- Neurovascular following; appreciate recs  -- Palliative following; GOC conversation 2/7; appreciate recs  -- LP ordered. Neuro will perform under fluoro guidance.               Cards:   -- HDS  -- goal SBP < 180, MAP >65  -- hypertensive, hydralazine and labetalol prns  -- Continue amlodipine 10mg daily; coreg 25mg BID      Pulm:   #Acute Respiratory Failure  -- Intubated on spontaneous , Pressure support.  -- Goal O2 sat > 90%      Renal:  #ALVARADO on CKD  #ATN  Received HD 2/9.   -- RIJ trialysis removed and replaced with LIJ trialysis; CXR confirmed placement  -- Completed SLED overnight   -- Nephrology following; appreciate recs  -- RRT as needed    Recent Labs   Lab 02/11/24  1458 02/11/24  2117 02/12/24  0252   BUN 48* 36* 28*   CREATININE 3.1* 2.6* 1.9*        FEN / GI:   -- Daily CMPs  -- NGT in place   -- Replace lytes as needed  -- Nutrition: NPO, TF held. Will restart (Novasource Renal) at goal 30 ml/hr after OR  -- GI PPX   -- Nutrition following; appreciate recs  -- Continue psyllium      ID:    #Ayaz's gangrene  s/p wound vac exchange/ debridement x3 for nec fascitis. R groin tissue  with proteus, sensitive to ceftriaxone. Growing bacteroids as well.   -- Abx: ceftriaxone and flagyl EOT 2/22  -- ID following ; appreciate recs  -- OR today for further debridement/exchange    Recent Labs   Lab 02/11/24  0305 02/11/24  1458 02/12/24  0252   WBC 12.07 12.56 12.59        Heme/Onc:  -- Daily CBC  -- Heparin DVT ppx    Recent Labs   Lab 02/11/24  0305 02/11/24  1458 02/12/24  0252   HGB 6.4* 7.4* 7.8*    417 380        Endo:   #IDDM  Initially presented to OSH with DKA with latest A1C 10.4 AG Gap resolved  - Placed on insulin gtt 2/3: Insulin infusion at 0.6 u/hr with stepdown parameters   - q4h BG monitoring while NPO  - Novolog 6units q4h while on TFs  - Moderate dose SSI PRN  - Endocrine following, appreciate recs        PPx:   Feeding: NPO  Analgesia/Sedation: See above  Thromboembolic prevention: SQH   HOB >30: Y  Stress Ulcer ppx: Famotidine  Glucose control: Goal 140-180 g/dl, Insulin gtt, kermit novolog, SSI, Endo following  Bowel reg: psyllium  Invasive Lines/Drains/Airway: Glynn, ETT, LIJ Trialysis, PIV x2, Rectal tube      Dispo/Code Status/Palliative:   -- SICU / Full Code

## 2024-02-12 NOTE — PROGRESS NOTES
[facility]  Palliative Medicine  Progress Note    Patient Name: Sarah Saravia  MRN: [id number]  Admission Date: 1/29/2024  Hospital Length of Stay: 14 days  Code Status: Full Code   Attending Provider: Steve Cole MD  Consulting Provider: Estee Guzman MD  [facility]  Principal Problem:Ayaz's gangrene    Patient information was obtained from spouse/SO and primary team.      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 Palliative and Supportive Care was consulted to explore goals of care.    Advance Care Planning  Goals of Care  - Code status: Full code  - Next of kin: two adult children   - Daughter [facility] [phone]   - Son is in long term  - ACP documents: none  - Patient does not have decision making capacity  - Prognosis: guarded  - Goals: life prolongation  - Plans: continue full supportive care, will follow along for family support during this very vulnerable time as prognosis is clarified    Goals of Care Conversation:  - 2/7/24: I met Ms. Saravia, intubated, not on sedation but not responsive or following commands, on spontaneous breathing trial. No family at bedside. I called her daughter [facility], who shared her understanding that her mother was newly found to have many mini-strokes in her brain, but believed that the doctors were looking for the source of the clot. We reviewed her mother's severe  medical illnesses, including Ayaz's gangrene for which she received several debridements/wound vac exchanges, multiple IV antibiotics which she is still on, severe hyperglycemia, dependence on ventilator to protect her airway in setting of severe sepsis, acute renal failure now dependent on dialysis machine and encephalopathy for which the doctors discovered multiple mini-strokes. I shared that I learned that the team engaged the stroke physicians, who expressed concern that the pattern of stroke in the setting of severe infection might be related to each other, and are searching for possible sources of infection hiding elsewhere in her body, including her heart. Aleks asked if possibly the sponge inside her mother's wound was the source of infection, and I shared that knowing that that the surgeons are still doing wound vac exchanges, that likely it isn't because they are treating her proactively to not only wipe away any infection that they can, but to also proactively treat her to minimize her risk of worsening infection. I admitted that I'm not a surgeon and cannot explain this as helpful as I wish I could. Aleks shared that thankfully the doctor on the surgical team has been communicating in understandable terms so feels assured that she is being informed thoroughly and trusts that she can ask these questions again in the future. She was appropriately tearful as I explained concern that her mother is critically ill and that her surgical team is aggressively treating her and thoroughly looking for any opportunities to help her get better. Aleks expressed gratefulness for this.  - I asked Aleks to share with me more about her mother. 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 She lives with her long-time partner of 26-28 years. Aleks is 31-years-old so recognized this man as her step-father and consider each other as family, though Ms. Saravia and  her partner are not legally . Ms. Saravia also has a son, but he is in [facility]. She has five grandchildren (through son), enjoys watching movies, going out to eat, and shopping all day. This sudden illness has been a huge shock and change in her life, and are hoping to help her return to her original life. Validated how terrifying and overwhelming this is, and reassured her that the doctors are working very aggressively in hopes to restore as much of her mother's health. Did warn that while the doctors promise to be thoughtful, compassionate and thorough, it doesn't mean we can promise the results we pray and hope for. Aleks expressed understanding. Will continue to follow along for support as Ms. Saravia's trajectory unfolds.  - 2/12/24: Met Ms. Saravia and her long-time boyfriend Mr. Gallegos at bedside. Supportive conversation held which Ms. Saravia could not participate due to persistent encephalopathy. Mr. Gallegos expresses deep thanks to the SICU team for doing everything they can to find out why she is persistently encephalopathic. He shared how shocking it was for the sudden decline after her first operation. He shares that Ms. Saravia has always been a very active woman, echoing Aleks who had shared with me earlier about her love for shopping, eating at restaurants and going to the movies. He admits that they've never discussed end of life or preferences related to artificial life support. 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 He thinks that as an active woman, a life committed to the bed would be devastating. However, today he saw her open her eyes to his voice and move her foot. This is an improvement in her neuro status and he is hoping that this is a sign that there is more improvement in the future. Agreed that it's so important to hold hope for a meaningful recovery and also staying mindful that  we respect Ms. Saravia's dignity and quality of life by not committing her to a life that she would not find acceptable. He reiterated family's deep desire to exhaust all opportunities to help her improve. Reassured him that her team is absolutely doing this and also staying mindful/transparent about their own worries about our limitations as human beings as well. He tells me that he spends the nights with Ms. Saravia, leaves around 11 AM to return home to wash up/take care of responsibilities. I shared I will return later this week in the morning to make sure I can check in again during the hours he is in the hospital.             I will follow-up with patient. Please contact us if you have any additional questions.    Subjective:     Chief Complaint:   Chief Complaint   Patient presents with    Vomiting     Vomiting and diarrhea for 3-4 days.   Wound to posterior right thigh.        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 Palliative and Supportive Care was consulted to explore goals of care.    Hospital Course:  No notes on file    Interval History: Intubated, not sedated and not following commands. Met long-time boyfriend at bedside Mr. Gallegos. Supportive conversation held. Pursuing aggressive full supportive care.    Past Medical History:  T2DM  CKD3a    Past Surgical History:   Procedure Laterality Date    INCISION AND DRAINAGE OF PERIRECTAL REGION N/A 1/29/2024    Procedure: INCISION AND DRAINAGE, PERIRECTAL REGION;  Surgeon: Axel Ramsay MD;  Location: [facility];  Service: General;   Laterality: N/A;    PLACEMENT, TRIALYSIS CATH Right 1/31/2024    Procedure: INSERTION, CATHETER, TRIPLE LUMEN, HEMODIALYSIS, TEMPORARY;  Surgeon: Alvin Junior MD;  Location: [facility] OR;  Service: General;  Laterality: Right;    WOUND DEBRIDEMENT Bilateral 2/2/2024    Procedure: DEBRIDEMENT, WOUND;  Surgeon: Steve Cole MD;  Location: [facility] OR 2ND FLR;  Service: General;  Laterality: Bilateral;  Bilateral groin  Possible wound vac placement    WOUND DEBRIDEMENT Right 2/6/2024    Procedure: DEBRIDEMENT, WOUND, replace wound vac, possible closure;  Surgeon: Steve Cole MD;  Location: [facility] OR 2ND FLR;  Service: General;  Laterality: Right;  RLE    WOUND DEBRIDEMENT Right 2/9/2024    Procedure: DEBRIDEMENT, WOUND w wound vac change;  Surgeon: Steve Cole MD;  Location: [facility] OR 2ND FLR;  Service: General;  Laterality: Right;  RLE    WOUND EXPLORATION Right 1/31/2024    Procedure: IRRIGATION & DEBRIDEMENT, WOUND DEBRIDEMENT;  Surgeon: Alvin Junior MD;  Location: [facility] OR;  Service: General;  Laterality: Right;       Review of patient's allergies indicates:  No Known Allergies    Medications:  Continuous Infusions:   sodium chloride 0.9% 200 mL/hr at 02/12/24 1100    calcium gluconate 3 g in dextrose 5 % (D5W) 100 mL infusion      dextrose 10 % in water (D10W)      dextrose-sod citrate-citric ac       Scheduled Meds:   sodium chloride 0.9%   Intravenous Once    amLODIPine  10 mg Per OG tube Daily    carvediloL  25 mg Per OG tube BID    cefTRIAXone (Rocephin) IV (PEDS and ADULTS)  2 g Intravenous Q24H    famotidine  20 mg Per OG tube Daily    heparin (porcine)  7,500 Units Subcutaneous Q8H    insulin aspart U-100  6 Units Subcutaneous Q4H    insulin detemir U-100  12 Units Subcutaneous Daily    metronidazole  500 mg Intravenous Q8H    psyllium husk (aspartame)  1 packet Per OG tube BID    sevelamer carbonate  1.6 g Per OG tube TID WM     PRN Meds:0.9%  NaCl infusion (for blood administration), sodium chloride  0.9%, albuterol-ipratropium, dextrose 10 % in water (D10W), dextrose 10%, dextrose 10%, glucagon (human recombinant), glucose, glucose, hydrALAZINE, insulin aspart U-100, labetalol, magnesium sulfate IVPB, naloxone, ondansetron, oxyCODONE, oxyCODONE, prochlorperazine, sodium chloride 0.9%, sodium chloride 0.9%, sodium chloride 0.9%, sodium phosphate 20.01 mmol in dextrose 5 % (D5W) 250 mL IVPB, sodium phosphate 30 mmol in dextrose 5 % (D5W) 250 mL IVPB, sodium phosphate 39.99 mmol in dextrose 5 % (D5W) 250 mL IVPB    Family History    None       Tobacco Use    Smoking status: Not on file    Smokeless tobacco: Not on file   Substance and Sexual Activity    Alcohol use: Not on file    Drug use: Not on file    Sexual activity: Not on file       Review of Systems   Unable to perform ROS: Mental status change     Objective:     Vital Signs (Most Recent):  Temp: 98.7 °F (37.1 °C) (02/12/24 1101)  Pulse: 88 (02/12/24 1200)  Resp: (!) 27 (02/12/24 1200)  BP: (!) 143/57 (02/12/24 1145)  SpO2: 98 % (02/12/24 1200) Vital Signs (24h Range):  Temp:  [98.5 °F (36.9 °C)-99.1 °F (37.3 °C)] 98.7 °F (37.1 °C)  Pulse:  [] 88  Resp:  [16-29] 27  SpO2:  [97 %-100 %] 98 %  BP: (133-179)/(56-78) 143/57     Weight: (!) 153.3 kg (338 lb)  Body mass index is 56.25 kg/m².       Physical Exam  Constitutional:       Appearance: She is obese.   HENT:      Head: Normocephalic and atraumatic.      Right Ear: External ear normal.      Left Ear: External ear normal.      Nose: Nose normal.      Mouth/Throat:      Mouth: Mucous membranes are dry.   Eyes:      Conjunctiva/sclera: Conjunctivae normal.   Cardiovascular:      Rate and Rhythm: Normal rate.   Pulmonary:      Breath sounds: Normal breath sounds.      Comments: On spontaneous breathing trial, ETT connected to ventilator  Abdominal:      General: Bowel sounds are normal.      Palpations: Abdomen is soft.   Musculoskeletal:         General: Swelling present.   Lymphadenopathy:       Cervical: No cervical adenopathy.   Skin:     General: Skin is warm and dry.   Neurological:      Comments: Not following commands despite no sedation            Review of Symptoms      Symptom Assessment (ESAS 0-10 Scale)  Unable to complete assessment due to Mental status change         Pain Assessment in Advanced Demential Scale (PAINAD)   Breathing - Independent of vocalization:  0  Negative vocalization:  0  Facial expression:  0  Body language:  0  Consolability:  0  Total:  0    Living Arrangements:  Lives with spouse    Psychosocial/Cultural:   See Palliative Psychosocial Note: No  Lives with long-time partner - not legally , together for 26 to 28 years. Has adult daughter (age 31) and adult son, who is in half-way. Has five grandchildren. Enjoyed watching movies, going out to eat, shopping. Worked as a patient caregiver for blind patients.  **Primary  to Follow**  Palliative Care  Consult: No    Spiritual:  F - Belen and Belief:  Believes in God  I - Importance:  Important  C - Community:  Does not attend Sikhism  A - Address in Care:  Engage  support        Advance Care Planning  Advance Directives:   Living Will: No    LaPOST: No    Do Not Resuscitate Status: No    Medical Power of : No      Decision Making:  Family answered questions  Goals of Care: What is most important right now is to focus on curative/life-prolongation (regardless of treatment burdens). Accordingly, we have decided that the best plan to meet the patient's goals includes continuing with treatment.         Significant Labs: CBC:   Recent Labs   Lab 02/11/24  0305 02/11/24  1458 02/12/24  0252   WBC 12.07 12.56 12.59   HGB 6.4* 7.4* 7.8*   HCT 19.3* 22.9* 24.2*    417 380       CMP:   Recent Labs   Lab 02/11/24  0305 02/11/24  1458 02/11/24  2117 02/12/24  0252   * 133* 135* 134*   K 4.4 4.3 4.3 4.3    104 105 103   CO2 18* 21* 21* 23   * 167* 158* 128*   BUN 51* 48*  36* 28*   CREATININE 4.0* 3.1* 2.6* 1.9*   CALCIUM 8.2* 8.2* 8.0* 8.2*   PROT 6.7  --   --  7.3   ALBUMIN 1.8* 1.9* 1.8* 1.9*   BILITOT 0.2  --   --  0.3   ALKPHOS 114  --   --  132   AST 32  --   --  31   ALT 13  --   --  14   ANIONGAP 10 8 9 8       CBC:   Recent Labs   Lab 02/12/24 0252   WBC 12.59   HGB 7.8*   HCT 24.2*   MCV 84          BMP:  Recent Labs   Lab 02/12/24 0252   *   *   K 4.3      CO2 23   BUN 28*   CREATININE 1.9*   CALCIUM 8.2*   MG 2.0       LFT:  Lab Results   Component Value Date    AST 31 02/12/2024    ALKPHOS 132 02/12/2024    BILITOT 0.3 02/12/2024     Albumin:   Albumin   Date Value Ref Range Status   02/12/2024 1.9 (L) 3.5 - 5.2 g/dL Final     Protein:   Total Protein   Date Value Ref Range Status   02/12/2024 7.3 6.0 - 8.4 g/dL Final     Lactic acid:   Lab Results   Component Value Date    LACTATE 1.5 02/01/2024    LACTATE 2.4 (H) 01/31/2024       Significant Imaging: I have reviewed all pertinent imaging results/findings within the past 24 hours.    I spent a total of 50 minutes on the day of the visit. This includes face to face time in discussion of goals of care, symptom assessment, coordination of care and emotional support. This also includes non-face to face time preparing to see the patient (eg, review of tests/imaging), obtaining and/or reviewing separately obtained history, documenting clinical information in the electronic or other health record, independently interpreting results and communicating results to the patient/family/caregiver, or care coordinator.       Estee Guzman MD  Palliative Medicine  [facility] - Surgical Intensive Care

## 2024-02-12 NOTE — PROGRESS NOTES
02/12/24 0300   Treatment   Treatment Type SLED   Treatment Status Clotting;Blood returned;Discontinued treatment   Dialysis Machine Number [id number]   Dialyzer Time (hours) 1.52   BVP (Liters) 16.8 L   Solutions Labeled and Current  Yes   Access Temporary Cath;Left;IJ   Catheter Dressing Intact  Yes   Alarms Engaged Yes   CRRT Comments venous chamber and dialyzer clotted, blood returned by primary nurse     SLED completed earlier due to clotting problem ran a total of 6 hours.   Patient clotted off RRT x3 overnight despite line change. Blood completely returned by primary nurse on all clotting events. Dr. Cat of nephrology informed regarding clotting events and was advised to hold off further RRT tonight with plans of running patient again later today with Citrate.

## 2024-02-12 NOTE — CARE UPDATE
Notified Dr. Soria of changes in ST segment on ECG, VSS at this time. Orders given to obtain 12 lead.   Also notified of low urine output over last two hours.

## 2024-02-12 NOTE — PROGRESS NOTES
02/11/24 1953 02/12/24 0105   Treatment   Treatment Type SLED SLED   Treatment Status Blood returned;Clotting Restart   Dialysis Machine Number [id number]   Dialyzer Time (hours) 1.38 0   BVP (Liters) 12.9 L 0 L   Solutions Labeled and Current  Yes Yes   Access Temporary Cath;Right;IJ Temporary Cath;Left;IJ   Catheter Dressing Intact  Yes Yes   Alarms Engaged Yes Yes   CRRT Comments blood retruned by primary nurse d/t increase venous pressure and TMP readings SLED restarted post line replacement     SLED restarted per MD orders post line replacement. Vs stable to restart tx. Patient still has 4 hours left to complete 8 hour tx. Report given to primary nurse.

## 2024-02-12 NOTE — PLAN OF CARE
[facility] - [facility] Int[facility]  Discharge Reassessment    Primary Care Provider: [facility] - [facility]    Expected Discharge Date: 2/16/2024    Reassessment (most recent)       Discharge Reassessment - 02/12/24 1412          Discharge Reassessment    Assessment Type Discharge Planning Reassessment     Communicated ZEKE with patient/caregiver Date not available/Unable to determine     Discharge Plan A Long-term acute care facility (LTAC)     Discharge Plan B Home;Rehab;Home with family     DME Needed Upon Discharge  none     Transition of Care Barriers Unisured     Why the patient remains in the hospital Requires continued medical care                     Per MD's Note, Interval History: NAEON. Afebrile. HDS. Remains intubated on minimal vent settings. SLED overnight. To OR today for vac morris       Discharge Plan A and Plan B have been determined by review of patient's clinical status, future medical and therapeutic needs, and coverage/benefits for post-acute care in coordination with multidisciplinary team members.       Jahaira Mckeon LMSW  Case Management [facility]

## 2024-02-12 NOTE — ASSESSMENT & PLAN NOTE
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 Palliative and Supportive Care was consulted to explore goals of care.    Advance Care Planning   Goals of Care  - Code status: Full code  - Next of kin: two adult children   - Daughter [facility] [phone]   - Son is in detention  - ACP documents: none  - Patient does not have decision making capacity  - Prognosis: guarded  - Goals: life prolongation  - Plans: continue full supportive care, will follow along for family support during this very vulnerable time as prognosis is clarified    Goals of Care Conversation:  - 2/7/24: I met Ms. Saravia, intubated, not on sedation but not responsive or following commands, on spontaneous breathing trial. No family at bedside. I called her daughter [facility], who shared her understanding that her mother was newly found to have many mini-strokes in her brain, but believed that the doctors were looking for the source of the clot. We reviewed her mother's severe medical illnesses, including Ayaz's gangrene for which she received several debridements/wound vac exchanges, multiple IV antibiotics which she is still on, severe hyperglycemia, dependence on ventilator to protect her airway in setting of severe sepsis, acute renal failure now dependent on dialysis machine and encephalopathy for which the doctors discovered multiple mini-strokes. I shared that I learned that the team engaged the stroke physicians, who expressed concern that the pattern of stroke in the setting of severe  infection might be related to each other, and are searching for possible sources of infection hiding elsewhere in her body, including her heart. Aleks asked if possibly the sponge inside her mother's wound was the source of infection, and I shared that knowing that that the surgeons are still doing wound vac exchanges, that likely it isn't because they are treating her proactively to not only wipe away any infection that they can, but to also proactively treat her to minimize her risk of worsening infection. I admitted that I'm not a surgeon and cannot explain this as helpful as I wish I could. Aleks shared that thankfully the doctor on the surgical team has been communicating in understandable terms so feels assured that she is being informed thoroughly and trusts that she can ask these questions again in the future. She was appropriately tearful as I explained concern that her mother is critically ill and that her surgical team is aggressively treating her and thoroughly looking for any opportunities to help her get better. Aleks expressed gratefulness for this.  - I asked Aleks to share with me more about her mother. 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 She lives with her long-time partner of 26-28 years. Aleks is 31-years-old so recognized this man as her step-father and consider each other as family, though Ms. Saravia and her partner are not legally . Ms. Saravia also has a son, but he is in retirement. She has five grandchildren (through son), enjoys watching movies, going out to eat, and shopping all day. This sudden illness has been a huge shock and change in her life, and are hoping to help her return to her original life. Validated how terrifying and overwhelming this is, and reassured her that the doctors are working very aggressively in hopes to restore as much of her mother's health. Did warn that while the doctors promise to be  thoughtful, compassionate and thorough, it doesn't mean we can promise the results we pray and hope for. Aleks expressed understanding. Will continue to follow along for support as Ms. Saravia's trajectory unfolds.  - 2/12/24: Met Ms. Saravia and her long-time boyfriend Mr. Gallegos at bedside. Supportive conversation held which Ms. Saravia could not participate due to persistent encephalopathy. Mr. Gallegos expresses deep thanks to the SICU team for doing everything they can to find out why she is persistently encephalopathic. He shared how shocking it was for the sudden decline after her first operation. He shares that Ms. Saravia has always been a very active woman, echoing Aleks who had shared with me earlier about her love for shopping, eating at restaurants and going to the movies. He admits that they've never discussed end of life or preferences related to artificial life support. 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 He thinks that as an active woman, a life committed to the bed would be devastating. However, today he saw her open her eyes to his voice and move her foot. This is an improvement in her neuro status and he is hoping that this is a sign that there is more improvement in the future. Agreed that it's so important to hold hope for a meaningful recovery and also staying mindful that we respect Ms. Saravia's dignity and quality of life by not committing her to a life that she would not find acceptable. He reiterated family's deep desire to exhaust all opportunities to help her improve. Reassured him that her team is absolutely doing this and also staying mindful/transparent about their own worries about our limitations as human beings as well. He tells me that he spends the nights with Ms. Saravia, leaves around 11 AM to return home to wash up/take care of responsibilities. I shared I will return  later this week in the morning to make sure I can check in again during the hours he is in the hospital.

## 2024-02-12 NOTE — SUBJECTIVE & OBJECTIVE
"Interval HPI:   Noacute events overnight. Patient in room 66786/60857 A. Blood glucose stable. BG at goal on current insulin regimen (Transition Insulin Drip). Steroid use- None .   Renal function- Abnormal -   Vasopressors-  None      Diet NPO      Eating:   NPO  Nausea: No  Hypoglycemia and intervention: No  Fever: No  TPN and/or TF: Yes 30 cc/hr (on hold for procedure)    BP (!) 173/76   Pulse 97   Temp 98.7 °F (37.1 °C) (Oral)   Resp (!) 25   Ht 5' 5" (1.651 m)   Wt (!) 153.3 kg (338 lb)   LMP 01/01/2024 (Approximate) Comment: tubal ligation  SpO2 98%   BMI 56.25 kg/m²     Labs Reviewed and Include    Recent Labs   Lab 02/12/24  0252   *   CALCIUM 8.2*   ALBUMIN 1.9*   PROT 7.3   *   K 4.3   CO2 23      BUN 28*   CREATININE 1.9*   ALKPHOS 132   ALT 14   AST 31   BILITOT 0.3     Lab Results   Component Value Date    WBC 12.59 02/12/2024    HGB 7.8 (L) 02/12/2024    HCT 24.2 (L) 02/12/2024    MCV 84 02/12/2024     02/12/2024     No results for input(s): "TSH", "FREET4" in the last 168 hours.  Lab Results   Component Value Date    HGBA1C 10.4 (H) 01/30/2024       Nutritional status:   Body mass index is 56.25 kg/m².  Lab Results   Component Value Date    ALBUMIN 1.9 (L) 02/12/2024    ALBUMIN 1.8 (L) 02/11/2024    ALBUMIN 1.9 (L) 02/11/2024     No results found for: "PREALBUMIN"    Estimated Creatinine Clearance: 51.6 mL/min (A) (based on SCr of 1.9 mg/dL (H)).    Accu-Checks  Recent Labs     02/11/24  0009 02/11/24  0307 02/11/24  0309 02/11/24  0721 02/11/24  1122 02/11/24  1538 02/11/24  1937 02/11/24  2336 02/12/24  0308 02/12/24  0711   POCTGLUCOSE 206* 59* 165* 159* 206* 153* 135* 165* 130* 139*       Current Medications and/or Treatments Impacting Glycemic Control  Immunotherapy:    Immunosuppressants       None          Steroids:   Hormones (From admission, onward)      None          Pressors:    Autonomic Drugs (From admission, onward)      None      "     Hyperglycemia/Diabetes Medications:   Antihyperglycemics (From admission, onward)      Start     Stop Route Frequency Ordered    02/12/24 0900  insulin detemir U-100 (Levemir) pen 12 Units         -- SubQ Daily 02/12/24 0850    02/09/24 1200  insulin aspart U-100 pen 6 Units         -- SubQ Every 4 hours 02/09/24 0901    02/03/24 1010  insulin aspart U-100 pen 0-10 Units         -- SubQ Every 4 hours PRN 02/03/24 0911

## 2024-02-12 NOTE — SUBJECTIVE & OBJECTIVE
Interval History/Significant Events: RIJ trialysis replaced with LIJ trialysis. CXR confirmed placement. Underwent SLED but CRRT clotted off but able to pull off 1600cc. Tube feeds and insulin gtt held for OR this AM for debridement and wound vac exchange. Does not respond to my voice nor withdraws from pain. Eyes no longer deviated but remains round, equal, and reactive.    Follow-up For: Procedure(s) (LRB):  DEBRIDEMENT, WOUND w wound vac change (Right)    Post-Operative Day: 3 Days Post-Op    Objective:     Vital Signs (Most Recent):  Temp: 99 °F (37.2 °C) (02/12/24 0300)  Pulse: 97 (02/12/24 0630)  Resp: (!) 27 (02/12/24 0630)  BP: (!) 174/72 (02/12/24 0630)  SpO2: 99 % (02/12/24 0630) Vital Signs (24h Range):  Temp:  [98.5 °F (36.9 °C)-99.1 °F (37.3 °C)] 99 °F (37.2 °C)  Pulse:  [] 97  Resp:  [16-29] 27  SpO2:  [97 %-99 %] 99 %  BP: (124-178)/(56-78) 174/72  Arterial Line BP: (123-147)/(58-64) 123/58     Weight: (!) 153.3 kg (338 lb)  Body mass index is 56.25 kg/m².      Intake/Output Summary (Last 24 hours) at 2/12/2024 0646  Last data filed at 2/12/2024 0600  Gross per 24 hour   Intake 3088.23 ml   Output 2640 ml   Net 448.23 ml          Constitutional:       Appearance: She is obese.   HENT:      Head: Normocephalic.      Mouth/Throat:      Mouth: Mucous membranes are moist.   Eyes:      Comments: PERRL, no gaze  Neck:      Comments: LIJ Trialysis  Cardiovascular:      Rate and Rhythm: Normal rate and regular rhythm.   Pulmonary:      Effort: Pulmonary effort is normal.      Comments: Intubated  Abdominal:      General: Abdomen is flat.      Palpations: Abdomen is soft.   Genitourinary:     Comments: R groin wound   Glynn in place  Rectal tube  Musculoskeletal:       BLE nonedematous   Skin:     General: Skin is warm.   Neurological:      Comments: Opens eyes but does not withdraw, track, or respond to pain.       Vents:  Vent Mode: Spont (02/12/24 0334)  Set Rate: 18 BPM (02/06/24 0349)  Vt Set: 420  mL (02/06/24 0349)  Pressure Support: 10 cmH20 (02/12/24 0334)  PEEP/CPAP: 5 cmH20 (02/12/24 0334)  Oxygen Concentration (%): 40 (02/12/24 0630)  Peak Airway Pressure: 16 cmH20 (02/12/24 0334)  Plateau Pressure: 15 cmH20 (02/12/24 0334)  Total Ve: 10 L/m (02/12/24 0334)  Negative Inspiratory Force (cm H2O): 0 (02/12/24 0334)  F/VT Ratio<105 (RSBI): (!) 76.5 (02/12/24 0334)    Lines/Drains/Airways       Central Venous Catheter Line  Duration             Trialysis (Dialysis) Catheter 02/11/24 2303 left internal jugular <1 day              Drain  Duration                  Rectal Tube 02/04/24 1545 7 days         NG/OG Tube 02/06/24 1030 Center mouth 5 days         Urethral Catheter 02/06/24 1900 5 days              Airway  Duration                  Airway - Non-Surgical 01/31/24 1020 11 days              Peripheral Intravenous Line  Duration                  Peripheral IV - Single Lumen 02/08/24 0152 18 G Anterior;Left Forearm 4 days         Peripheral IV - Single Lumen 02/09/24 0130 18 G Anterior;Right Forearm 3 days                    Significant Labs:    CBC/Anemia Profile:  Recent Labs   Lab 02/11/24  0305 02/11/24  1458 02/12/24  0252   WBC 12.07 12.56 12.59   HGB 6.4* 7.4* 7.8*   HCT 19.3* 22.9* 24.2*    417 380   MCV 83 86 84   RDW 18.2* 17.3* 17.2*        Chemistries:  Recent Labs   Lab 02/11/24  0305 02/11/24  1458 02/11/24  2117 02/12/24  0252   * 133* 135* 134*   K 4.4 4.3 4.3 4.3    104 105 103   CO2 18* 21* 21* 23   BUN 51* 48* 36* 28*   CREATININE 4.0* 3.1* 2.6* 1.9*   CALCIUM 8.2* 8.2* 8.0* 8.2*   ALBUMIN 1.8* 1.9* 1.8* 1.9*   PROT 6.7  --   --  7.3   BILITOT 0.2  --   --  0.3   ALKPHOS 114  --   --  132   ALT 13  --   --  14   AST 32  --   --  31   MG 1.9 1.8 1.7 2.0   PHOS 8.4* 6.6* 4.9* 3.7           Significant Imaging:  CXR confirms placement of LIJ trialysis but otherwise stable radiologic findings.

## 2024-02-12 NOTE — PROGRESS NOTES
"[facility] - [facility] Intensive Care  Adult Nutrition  Progress Note    SUMMARY     Recommendation/Intervention:   1) Resume TF once able postop- Novasource renal @ 30 mL/hr to provide 1440 kcal, 65g PRO, and 516ml FF- FWF per MD   2) If dialysis resumes, recommend changing formula to higher PRO formula - Impact Peptide 1.5 at goal rate of 40ml/hr to provide 1440 kcal, 90g PRO, and 739ml FF (100% of EEN and 79% of EPN)  3) RD team to monitor tolerance, skin, labs, wt and follow-up as needed    Goals: Meet % EEN/EPN by follow-up date.  Nutrition Goal Status: progressing towards goal  Communication of RD Recs:  (POC)    Assessment and Plan    Nutrition Problem  Inadequate oral intake     Related to (etiology):   Diagnosis related symptoms     Signs and Symptoms (as evidenced by):   Intubated  NPO     Interventions/Recommendations (treatment strategy):  Collaboration with medical providers     Nutrition Diagnosis Status:   Continues    Reason for Assessment    Reason For Assessment: RD follow-up  Diagnosis:  (ros's gangrene)  Relevant Medical History: .pmh  Interdisciplinary Rounds: did not attend  General Information Comments: Pt followed by RD team. NPO with TF ordered- Novasrouce renal @ 30 mL/hr via OGT. Held at this time for OR this AM- wound vac exchange. LBM 2/12. Pt intubated on vent. Per RN- TOSHIAD completed earlier due to clotting problem ran a total of 6 hours.   Nutrition Discharge Planning: pending medical course    Nutrition Risk Screen    Nutrition Risk Screen: tube feeding or parenteral nutrition    Nutrition/Diet History    Food Allergies: NKFA    Anthropometrics    Temp: 98.7 °F (37.1 °C)  Height Method: Stated  Height: 5' 5" (165.1 cm)  Height (inches): 65 in  Weight Method: Bed Scale  Weight: (!) 153.3 kg (338 lb)  Weight (lb): (!) 338 lb  Ideal Body Weight (IBW), Female: 125 lb  % Ideal Body Weight, Female (lb): 285.6 %  BMI (Calculated): 56.2  BMI Grade: greater than 40 - morbid obesity   "   Lab/Procedures/Meds    Pertinent Labs Reviewed: reviewed  Pertinent Labs Comments: Hgb: 7.8, Hct: 24.2, Na: 134, BUN: 28, Creatinine: 1.9, eGFR: 31.2, Glucose: 128, Phosphorus: 8.2  Pertinent Medications Reviewed: reviewed   sodium chloride 0.9%   Intravenous Once    amLODIPine  10 mg Per OG tube Daily    carvediloL  25 mg Per OG tube BID    cefTRIAXone (Rocephin) IV (PEDS and ADULTS)  2 g Intravenous Q24H    famotidine  20 mg Per OG tube Daily    heparin (porcine)  7,500 Units Subcutaneous Q8H    insulin aspart U-100  6 Units Subcutaneous Q4H    insulin detemir U-100  12 Units Subcutaneous Daily    metronidazole  500 mg Intravenous Q8H    psyllium husk (aspartame)  1 packet Per OG tube BID    sevelamer carbonate  1.6 g Per OG tube TID WM     Estimated/Assessed Needs    Weight Used For Calorie Calculations: 57 kg (125 lb 10.6 oz) (IBW d/t BMI >50.0)  Energy Calorie Requirements (kcal): 1425 kcal  Energy Need Method: Kcal/kg (25 kcal/kg of IBW d/t BMI >50.0)  Protein Requirements: 114- 143g (2.0-2.5g/kg)  Weight Used For Protein Calculations: 57 kg (125 lb 10.6 oz) (IBW d/t BMI >50.0)     Estimated Fluid Requirement Method: RDA Method  RDA Method (mL): 1425     Nutrition Prescription Ordered    Current Diet Order: NPO  Current Nutrition Support Formula Ordered: Novasource Renal  Current Nutrition Support Rate Ordered: 30 (ml)  TF held at this time    Evaluation of Received Nutrient/Fluid Intake    Enteral Calories (kcal):  (-)  Enteral Protein (gm):  (-)  Enteral (Free Water) Fluid (mL):  (-)  I/O: + 7.2 L since admit  Energy Calories Required: not meeting needs  Protein Required: not meeting needs  Fluid Required:  (as per MD)  Comments: LBM 2/12  % Intake of Estimated Energy Needs: 0 - 25 %  % Meal Intake: NPO    Nutrition Risk    Level of Risk/Frequency of Follow-up:  (RD to f/u x 1-2/week)     Monitor and Evaluation    Food and Nutrient Intake: enteral nutrition intake, parenteral nutrition intake  Food and  Nutrient Adminstration: enteral and parenteral nutrition administration  Knowledge/Beliefs/Attitudes: food and nutrition knowledge/skill, beliefs and attitudes  Physical Activity and Function: nutrition-related ADLs and IADLs, factors affecting access to physical activity  Anthropometric Measurements: weight, weight change, body mass index  Biochemical Data, Medical Tests and Procedures: electrolyte and renal panel  Nutrition-Focused Physical Findings: overall appearance     Nutrition Follow-Up    RD Follow-up?: Yes

## 2024-02-12 NOTE — PROCEDURES
"Sarah Saravia is a 53 y.o. female patient.    Temp: 99.1 °F (37.3 °C) (02/11/24 1900)  Pulse: 86 (02/11/24 2215)  Resp: (!) 21 (02/11/24 2215)  BP: (!) 158/70 (02/11/24 2215)  SpO2: 99 % (02/11/24 2215)  Weight: (!) 153.3 kg (338 lb) (02/08/24 0300)  Height: 5' 5" (165.1 cm) (02/11/24 1944)       Central Line    Date/Time: 2/11/2024 11:23 PM    Performed by: Jovita Castillo MD  Authorized by: Jovita Castillo MD    Location procedure was performed:  [facility]  Consent Done ?:  Yes  Time out complete?: Verified correct patient, procedure, equipment, staff, and site/side    Indications:  Hemodialysis  Anesthesia:  Local infiltration  Local anesthetic:  Lidocaine 1% with epinephrine  Anesthetic total (ml):  5  Preparation:  Skin prepped with ChloraPrep  Skin prep agent dried: Skin prep agent completely dried prior to procedure    Sterile barriers: All five maximal sterile barriers used - gloves, gown, cap, mask and large sterile sheet    Location:  Left internal jugular  Catheter type:  Trialysis  Catheter size:  13 Fr  Inserted Catheter Length (cm):  20  Ultrasound guidance: Yes    Needle advanced into vessel with real time ultrasound guidance.    Guidewire confirmed in vessel.    Steril sheath on probe.    Sterile gel used.  Manometry: No    Number of attempts:  2  Securement:  Line sutured, chlorhexidine patch, sterile dressing applied and blood return through all ports  Complications: No        2/11/2024    "

## 2024-02-12 NOTE — ASSESSMENT & PLAN NOTE
53 y.o. 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     - Last [place] change 2/9. To the OR today for wound vac change,   - Consented, TF held  - starting to discuss need for trach/PEG/permacath/diverting colostomy given that the family would like everything done; may plan for this late this week pending patient's clinical status and ongoing discussions with family regarding poor prognosis  - Palliative following given overall poor prognosis, daughter would like everything done; MRI slightly improved but no changes to patient's neuro status  - Daily SBTs  - Okay for DVT ppx   - Remainder of care per SICU

## 2024-02-12 NOTE — PROGRESS NOTES
[facility]  General Surgery  Progress Note    Subjective:     History of Present Illness:  53 year old morbidly obese female who does not routinely go to the doctor presents to the ED with malaise, nausea, vomiting, and groin, buttock, perineal swelling and tenderness. She began feeling ill a few days ago.     On arrival to the ED she is tachycardic to 120, hypertensive without fever. Labs demonstrate leukocytosis of 21, , with lactic acidosis and associated ALVARADO with cr 3.8, hyperglycemia with blood glucose of 824 accompanied by severe electrolyte derangements. CT imaging obtained demonstrates diffuse subcutaneous air along buttocks, perineum, anterior vulva, as well as bilateral thighs.     No prior abdominal surgeries. She denies problems with her heart or lungs. Non smoker. Does not routinely take any medications.    Post-Op Info:  Procedure(s) (LRB):  DEBRIDEMENT, WOUND w wound vac change (Right)   3 Days Post-Op     Interval History: NAEON. Afebrile. HDS. Remains intubated on minimal vent settings. SLED overnight. To OR today for vac change     Medications:  Continuous Infusions:   sodium chloride 0.9% 200 mL/hr at 02/12/24 0514    dextrose 10 % in water (D10W)       Scheduled Meds:   sodium chloride 0.9%   Intravenous Once    amLODIPine  10 mg Per OG tube Daily    carvediloL  25 mg Per OG tube BID    cefTRIAXone (Rocephin) IV (PEDS and ADULTS)  2 g Intravenous Q24H    famotidine  20 mg Per OG tube Daily    heparin (porcine)  7,500 Units Subcutaneous Q8H    insulin aspart U-100  6 Units Subcutaneous Q4H    insulin detemir U-100  12 Units Subcutaneous Daily    metronidazole  500 mg Intravenous Q8H    psyllium husk (aspartame)  1 packet Per OG tube BID    sevelamer carbonate  1.6 g Per OG tube TID WM     PRN Meds:0.9%  NaCl infusion (for blood administration), sodium chloride 0.9%, albuterol-ipratropium, dextrose 10 % in water (D10W), dextrose 10%, dextrose 10%, glucagon (human  recombinant), glucose, glucose, hydrALAZINE, insulin aspart U-100, labetalol, magnesium sulfate IVPB, naloxone, ondansetron, oxyCODONE, oxyCODONE, prochlorperazine, sodium chloride 0.9%, sodium chloride 0.9%, sodium chloride 0.9%, sodium phosphate 20.01 mmol in dextrose 5 % (D5W) 250 mL IVPB, sodium phosphate 30 mmol in dextrose 5 % (D5W) 250 mL IVPB, sodium phosphate 39.99 mmol in dextrose 5 % (D5W) 250 mL IVPB     Review of patient's allergies indicates:  No Known Allergies  Objective:     Vital Signs (Most Recent):  Temp: 98.7 °F (37.1 °C) (02/12/24 0701)  Pulse: 97 (02/12/24 0815)  Resp: (!) 25 (02/12/24 0815)  BP: (!) 173/76 (02/12/24 0815)  SpO2: 98 % (02/12/24 0815) Vital Signs (24h Range):  Temp:  [98.5 °F (36.9 °C)-99.1 °F (37.3 °C)] 98.7 °F (37.1 °C)  Pulse:  [] 97  Resp:  [16-29] 25  SpO2:  [97 %-99 %] 98 %  BP: (124-178)/(56-78) 173/76  Arterial Line BP: (123-133)/(58-60) 123/58     Weight: (!) 153.3 kg (338 lb)  Body mass index is 56.25 kg/m².    Intake/Output - Last 3 Shifts         02/10 0700  02/11 0659 02/11 0700 02/12 0659 02/12 0700 02/13 0659    I.V. (mL/kg) 28.9 (0.2) 2261.4 (14.8)     Blood 350      Other       NG/ 570 30    IV Piggyback 302.7 256.8     Total Intake(mL/kg) 1391.7 (9.1) 3088.2 (20.1) 30 (0.2)    Urine (mL/kg/hr) 525 (0.1) 745 (0.2) 15 (0.1)    Other 350 1895 100    Stool 100 0     Total Output 975 2640 115    Net +416.7 +448.2 -85           Stool Occurrence 1 x 1 x             Physical Exam  Vitals and nursing note reviewed.   Constitutional:       General: She is not in acute distress.     Appearance: She is ill-appearing.   HENT:      Head: Normocephalic and atraumatic.   Cardiovascular:      Rate and Rhythm: Normal rate and regular rhythm.   Pulmonary:      Comments: Vent Mode: Spont  Oxygen Concentration (%):  [40] 40  Resp Rate Total:  [15 br/min-28 br/min] 27 br/min  PEEP/CPAP:  [5 cmH20] 5 cmH20  Pressure Support:  [10 cmH20] 10 cmH20  Mean Airway Pressure:   [8.6 cmH20-9.8 cmH20] 8.9 cmH20  Abdominal:      General: There is no distension.      Palpations: Abdomen is soft.      Tenderness: There is no abdominal tenderness.   Skin:     General: Skin is warm and dry.      Comments: Wound vac in place to R thigh/groin to suction          Significant Labs:  I have reviewed all pertinent lab results within the past 24 hours.  CBC:   Recent Labs   Lab 02/12/24 0252   WBC 12.59   RBC 2.89*   HGB 7.8*   HCT 24.2*      MCV 84   MCH 27.0   MCHC 32.2       BMP:   Recent Labs   Lab 02/12/24 0252   *   *   K 4.3      CO2 23   BUN 28*   CREATININE 1.9*   CALCIUM 8.2*   MG 2.0         Significant Diagnostics:  I have reviewed all pertinent imaging results/findings within the past 24 hours.  Assessment/Plan:     * Ayaz's gangrene  53 y.o. 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     - Last WV change 2/9. To the OR today for wound vac change,   - Consented, TF held  - starting to discuss need for trach/PEG/permacath/diverting colostomy given that the family would like everything done; may plan for this late this week pending patient's clinical status and ongoing discussions with family regarding poor prognosis  - Palliative following given overall poor prognosis, daughter would like everything done; MRI slightly improved but no changes to patient's neuro status  - Daily SBTs  - Okay for DVT ppx   - Remainder of care per SICU    Case discussed with general surgery staff.      Braxton Calhoun PA-C  General Surgery  [facility]

## 2024-02-12 NOTE — PLAN OF CARE
SICU PLAN OF CARE NOTE    Dx: Ayaz's gangrene    Goals of Care: MAP >65, SBP <170    Vital Signs (last 12 hours):   Temp:  [98.6 °F (37 °C)-99.1 °F (37.3 °C)]   Pulse:  [81-99]   Resp:  [16-28]   BP: (133-178)/(57-78)   SpO2:  [97 %-99 %]      Neuro: Unresponsive    Cardiac: NSR    Respiratory: Ventilator    Gtts: Insulin    Urine Output: Urinary Catheter 265 cc/shift    Drains: Wound Vac, total output 200 / shift    Diet: NPO and Tube Feeds    Labs/Accuchecks: Accuchecks Q4.    Skin:  Wounds and incisions per documentation.    Shift Events:  NAEON. VSS. CRRT clotted x2. Nephro to add citrate tonight. Minimal output from wound vac. UOP adequate. OR for debridement in AM.

## 2024-02-12 NOTE — ASSESSMENT & PLAN NOTE
BG goal 140-180  No previous hx of T2DM per family at bedside. A1c of 10.4. DKA at OSH. TF on..BG stable on regimen.  Will transition to Levemir from transition drip at critical care's request.    Plan:  - Discontinue transition IV insulin infusion  - Start on Levemir 12 units daily.  - Continue Novolog 6 units q 4 hrs while on tube feeding (HOLD if tube feeding is stopped or BG < 100)   - Continue Moderate Dose Correction Scale  - BG monitoring q 4 hrs while NPO /TF    ** Please call Endocrine for any BG related issues **      Discharge plans: TBD    Lab Results   Component Value Date    HGBA1C 10.4 (H) 01/30/2024

## 2024-02-12 NOTE — EICU
Intervention Initiated From:  Bedside    Teofilo intervened regarding:  Time-Out    Comments: elert from bedside staff for timeout for Trialysis line placement per Dr. Jovita Castillo. See timeout flowsheet.   11:10-pt tolerated procedure well.

## 2024-02-12 NOTE — SUBJECTIVE & OBJECTIVE
Interval History: NAEON. Afebrile. HDS. Remains intubated on minimal vent settings. SLED overnight. To OR today for vac change     Medications:  Continuous Infusions:   sodium chloride 0.9% 200 mL/hr at 02/12/24 0514    dextrose 10 % in water (D10W)       Scheduled Meds:   sodium chloride 0.9%   Intravenous Once    amLODIPine  10 mg Per OG tube Daily    carvediloL  25 mg Per OG tube BID    cefTRIAXone (Rocephin) IV (PEDS and ADULTS)  2 g Intravenous Q24H    famotidine  20 mg Per OG tube Daily    heparin (porcine)  7,500 Units Subcutaneous Q8H    insulin aspart U-100  6 Units Subcutaneous Q4H    insulin detemir U-100  12 Units Subcutaneous Daily    metronidazole  500 mg Intravenous Q8H    psyllium husk (aspartame)  1 packet Per OG tube BID    sevelamer carbonate  1.6 g Per OG tube TID WM     PRN Meds:0.9%  NaCl infusion (for blood administration), sodium chloride 0.9%, albuterol-ipratropium, dextrose 10 % in water (D10W), dextrose 10%, dextrose 10%, glucagon (human recombinant), glucose, glucose, hydrALAZINE, insulin aspart U-100, labetalol, magnesium sulfate IVPB, naloxone, ondansetron, oxyCODONE, oxyCODONE, prochlorperazine, sodium chloride 0.9%, sodium chloride 0.9%, sodium chloride 0.9%, sodium phosphate 20.01 mmol in dextrose 5 % (D5W) 250 mL IVPB, sodium phosphate 30 mmol in dextrose 5 % (D5W) 250 mL IVPB, sodium phosphate 39.99 mmol in dextrose 5 % (D5W) 250 mL IVPB     Review of patient's allergies indicates:  No Known Allergies  Objective:     Vital Signs (Most Recent):  Temp: 98.7 °F (37.1 °C) (02/12/24 0701)  Pulse: 97 (02/12/24 0815)  Resp: (!) 25 (02/12/24 0815)  BP: (!) 173/76 (02/12/24 0815)  SpO2: 98 % (02/12/24 0815) Vital Signs (24h Range):  Temp:  [98.5 °F (36.9 °C)-99.1 °F (37.3 °C)] 98.7 °F (37.1 °C)  Pulse:  [] 97  Resp:  [16-29] 25  SpO2:  [97 %-99 %] 98 %  BP: (124-178)/(56-78) 173/76  Arterial Line BP: (123-133)/(58-60) 123/58     Weight: (!) 153.3 kg (338 lb)  Body mass index is 56.25  kg/m².    Intake/Output - Last 3 Shifts         02/10 0700  02/11 0659 02/11 0700  02/12 0659 02/12 0700 02/13 0659    I.V. (mL/kg) 28.9 (0.2) 2261.4 (14.8)     Blood 350      Other       NG/ 570 30    IV Piggyback 302.7 256.8     Total Intake(mL/kg) 1391.7 (9.1) 3088.2 (20.1) 30 (0.2)    Urine (mL/kg/hr) 525 (0.1) 745 (0.2) 15 (0.1)    Other 350 1895 100    Stool 100 0     Total Output 975 2640 115    Net +416.7 +448.2 -85           Stool Occurrence 1 x 1 x              Physical Exam  Vitals and nursing note reviewed.   Constitutional:       General: She is not in acute distress.     Appearance: She is ill-appearing.   HENT:      Head: Normocephalic and atraumatic.   Cardiovascular:      Rate and Rhythm: Normal rate and regular rhythm.   Pulmonary:      Comments: Vent Mode: Spont  Oxygen Concentration (%):  [40] 40  Resp Rate Total:  [15 br/min-28 br/min] 27 br/min  PEEP/CPAP:  [5 cmH20] 5 cmH20  Pressure Support:  [10 cmH20] 10 cmH20  Mean Airway Pressure:  [8.6 cmH20-9.8 cmH20] 8.9 cmH20  Abdominal:      General: There is no distension.      Palpations: Abdomen is soft.      Tenderness: There is no abdominal tenderness.   Skin:     General: Skin is warm and dry.      Comments: Wound vac in place to R thigh/groin to suction          Significant Labs:  I have reviewed all pertinent lab results within the past 24 hours.  CBC:   Recent Labs   Lab 02/12/24  0252   WBC 12.59   RBC 2.89*   HGB 7.8*   HCT 24.2*      MCV 84   MCH 27.0   MCHC 32.2       BMP:   Recent Labs   Lab 02/12/24  0252   *   *   K 4.3      CO2 23   BUN 28*   CREATININE 1.9*   CALCIUM 8.2*   MG 2.0         Significant Diagnostics:  I have reviewed all pertinent imaging results/findings within the past 24 hours.

## 2024-02-12 NOTE — TRANSFER OF CARE
"Anesthesia Transfer of Care Note    Patient: Sarah Saravia    Procedure(s) Performed: Procedure(s) (LRB):  REPLACEMENT, WOUND VAC (Right)  R thigh wound debridement (Right)    Patient location: ICU    Anesthesia Type: general    Transport from OR: Continuous ECG monitoring in transport. Continuous SpO2 monitoring in transport. Transported from OR intubated on 100% O2 by AMBU with adequate controlled ventilation    Post pain: adequate analgesia    Post assessment: no apparent anesthetic complications    Post vital signs: stable    Level of consciousness: sedated    Nausea/Vomiting: no nausea/vomiting    Complications: none    Transfer of care protocol was followed    Last vitals: Visit Vitals  BP (!) 153/69 (BP Location: Left arm, Patient Position: Lying)   Pulse 90   Temp 37.1 °C (98.7 °F) (Oral)   Resp (!) 27   Ht 5' 5" (1.651 m)   Wt (!) 153.3 kg (338 lb)   LMP 01/01/2024 (Approximate)   SpO2 99%   BMI 56.25 kg/m²     "

## 2024-02-12 NOTE — PROGRESS NOTES
[facility]  Critical Care - Surgery  Progress Note    Patient Name: Sarah Saravia  MRN: [id number]  Admission Date: 1/29/2024  Hospital Length of Stay: 14 days  Code Status: Full Code  Attending Provider: Steve Cole MD  [facility]   Principal Problem: Ayaz's gangrene    Subjective:   Interval History/Significant Events: RIJ trialysis replaced with LIJ trialysis. CXR confirmed placement. Underwent SLED but CRRT clotted off but able to pull off 1600cc. Tube feeds and insulin gtt held for OR this AM for debridement and wound vac exchange. Does not respond to my voice nor withdraws from pain. Eyes no longer deviated but remains round, equal, and reactive.    Follow-up For: Procedure(s) (LRB):  DEBRIDEMENT, WOUND w wound vac change (Right)    Post-Operative Day: 3 Days Post-Op    Objective:     Vital Signs (Most Recent):  Temp: 99 °F (37.2 °C) (02/12/24 0300)  Pulse: 97 (02/12/24 0630)  Resp: (!) 27 (02/12/24 0630)  BP: (!) 174/72 (02/12/24 0630)  SpO2: 99 % (02/12/24 0630) Vital Signs (24h Range):  Temp:  [98.5 °F (36.9 °C)-99.1 °F (37.3 °C)] 99 °F (37.2 °C)  Pulse:  [] 97  Resp:  [16-29] 27  SpO2:  [97 %-99 %] 99 %  BP: (124-178)/(56-78) 174/72  Arterial Line BP: (123-147)/(58-64) 123/58     Weight: (!) 153.3 kg (338 lb)  Body mass index is 56.25 kg/m².      Intake/Output Summary (Last 24 hours) at 2/12/2024 0646  Last data filed at 2/12/2024 0600  Gross per 24 hour   Intake 3088.23 ml   Output 2640 ml   Net 448.23 ml          Constitutional:       Appearance: She is obese.   HENT:      Head: Normocephalic.      Mouth/Throat:      Mouth: Mucous membranes are moist.   Eyes:      Comments: PERRL, no gaze  Neck:      Comments: LIJ Trialysis  Cardiovascular:      Rate and Rhythm: Normal rate and regular rhythm.   Pulmonary:      Effort: Pulmonary effort is normal.      Comments: Intubated  Abdominal:      General: Abdomen is flat.       Palpations: Abdomen is soft.   Genitourinary:     Comments: R groin wound   Glynn in place  Rectal tube  Musculoskeletal:       BLE nonedematous   Skin:     General: Skin is warm.   Neurological:      Comments: Opens eyes but does not withdraw, track, or respond to pain.       Vents:  Vent Mode: Spont (02/12/24 0334)  Set Rate: 18 BPM (02/06/24 0349)  Vt Set: 420 mL (02/06/24 0349)  Pressure Support: 10 cmH20 (02/12/24 0334)  PEEP/CPAP: 5 cmH20 (02/12/24 0334)  Oxygen Concentration (%): 40 (02/12/24 0630)  Peak Airway Pressure: 16 cmH20 (02/12/24 0334)  Plateau Pressure: 15 cmH20 (02/12/24 0334)  Total Ve: 10 L/m (02/12/24 0334)  Negative Inspiratory Force (cm H2O): 0 (02/12/24 0334)  F/VT Ratio<105 (RSBI): (!) 76.5 (02/12/24 0334)    Lines/Drains/Airways       Central Venous Catheter Line  Duration             Trialysis (Dialysis) Catheter 02/11/24 2303 left internal jugular <1 day              Drain  Duration                  Rectal Tube 02/04/24 1545 7 days         NG/OG Tube 02/06/24 1030 Center mouth 5 days         Urethral Catheter 02/06/24 1900 5 days              Airway  Duration                  Airway - Non-Surgical 01/31/24 1020 11 days              Peripheral Intravenous Line  Duration                  Peripheral IV - Single Lumen 02/08/24 0152 18 G Anterior;Left Forearm 4 days         Peripheral IV - Single Lumen 02/09/24 0130 18 G Anterior;Right Forearm 3 days                    Significant Labs:    CBC/Anemia Profile:  Recent Labs   Lab 02/11/24  0305 02/11/24  1458 02/12/24  0252   WBC 12.07 12.56 12.59   HGB 6.4* 7.4* 7.8*   HCT 19.3* 22.9* 24.2*    417 380   MCV 83 86 84   RDW 18.2* 17.3* 17.2*        Chemistries:  Recent Labs   Lab 02/11/24  0305 02/11/24  1458 02/11/24  2117 02/12/24  0252   * 133* 135* 134*   K 4.4 4.3 4.3 4.3    104 105 103   CO2 18* 21* 21* 23   BUN 51* 48* 36* 28*   CREATININE 4.0* 3.1* 2.6* 1.9*   CALCIUM 8.2* 8.2* 8.0* 8.2*   ALBUMIN 1.8* 1.9* 1.8* 1.9*    PROT 6.7  --   --  7.3   BILITOT 0.2  --   --  0.3   ALKPHOS 114  --   --  132   ALT 13  --   --  14   AST 32  --   --  31   MG 1.9 1.8 1.7 2.0   PHOS 8.4* 6.6* 4.9* 3.7           Significant Imaging:  CXR confirms placement of LIJ trialysis but otherwise stable radiologic findings.  Assessment/Plan:     Renal/  * Ayaz's gangrene    Neuro/Psych:   #Acute Encephalopathy  CTH 2/3 with scattered hypoattenuation likely representing age indeterminate infarcts. EEG findings consistent with moderate-severe encephalopathy. MRI 2/6: Several scattered punctate acute infarcts throughout the bilateral frontal lobes, centrum semiovale, basal ganglia, corpus callosum, and cerebellar hemispheres.  CTA 2/6: Atherosclerotic plaquing of the carotid bifurcations and proximal ICAs with less than 50% proximal ICA stenosis by NASCET criteria . Repeat CTH and MRI/MRA unchanged from prior exams. Has currently been wound vac/debridement x3 and due for OR this AM.    Neuro/Psych  Repeat CTH and MRI/MRA stable from initial reads.   -- Sedation: None  -- Pain: kermit tylenol, dialudid and oxy prn  -- GCS 7T: E2, V1T, M4; exam stable  -- Neurology following; appreciate recs  -- Neurovascular following; appreciate recs  -- Palliative following; GOC conversation 2/7; appreciate recs  -- LP ordered. Neuro will perform under fluoro guidance.               Cards:   -- HDS  -- goal SBP < 180, MAP >65  -- hypertensive, hydralazine and labetalol prns  -- Continue amlodipine 10mg daily; coreg 25mg BID      Pulm:   #Acute Respiratory Failure  -- Intubated on spontaneous , Pressure support.  -- Goal O2 sat > 90%      Renal:  #ALVARADO on CKD  #ATN  Received HD 2/9.   -- RIJ trialysis removed and replaced with LIJ trialysis; CXR confirmed placement  -- Completed SLED overnight   -- Nephrology following; appreciate recs  -- RRT as needed    Recent Labs   Lab 02/11/24  1458 02/11/24  2117 02/12/24  0252   BUN 48* 36* 28*   CREATININE 3.1* 2.6* 1.9*         FEN / GI:   -- Daily CMPs  -- NGT in place   -- Replace lytes as needed  -- Nutrition: NPO, TF held. Will restart (Novasource Renal) at goal 30 ml/hr after OR  -- GI PPX   -- Nutrition following; appreciate recs  -- Continue psyllium      ID:    #Ayaz's gangrene  s/p wound vac exchange/ debridement x3 for nec fascitis. R groin tissue with proteus, sensitive to ceftriaxone. Growing bacteroids as well.   -- Abx: ceftriaxone and flagyl EOT 2/22  -- ID following ; appreciate recs  -- OR today for further debridement/exchange    Recent Labs   Lab 02/11/24  0305 02/11/24  1458 02/12/24  0252   WBC 12.07 12.56 12.59        Heme/Onc:  -- Daily CBC  -- Heparin DVT ppx    Recent Labs   Lab 02/11/24  0305 02/11/24  1458 02/12/24  0252   HGB 6.4* 7.4* 7.8*    417 380        Endo:   #IDDM  Initially presented to OSH with DKA with latest A1C 10.4 AG Gap resolved  - Placed on insulin gtt 2/3: Insulin infusion at 0.6 u/hr with stepdown parameters   - q4h BG monitoring while NPO  - Novolog 6units q4h while on TFs  - Moderate dose SSI PRN  - Endocrine following, appreciate recs        PPx:   Feeding: NPO  Analgesia/Sedation: See above  Thromboembolic prevention: SQH   HOB >30: Y  Stress Ulcer ppx: Famotidine  Glucose control: Goal 140-180 g/dl, Insulin gtt, kermit novolog, SSI, Endo following  Bowel reg: psyllium  Invasive Lines/Drains/Airway: Glynn, ETT, LIJ Trialysis, PIV x2, Rectal tube      Dispo/Code Status/Palliative:   -- SICU / Full Code        David Stokes MD  Critical Care - Surgery  [facility]

## 2024-02-12 NOTE — PROGRESS NOTES
[facility]  Nephrology  Progress Note    Patient Name: Sarah Saravia  MRN: [id number]  Admission Date: 1/29/2024  Hospital Length of Stay: 14 days  Attending Provider: Steve Cole MD   Primary Care Physician: [facility]  Principal Problem:Ayaz's gangrene    Subjective:     HPI: Sarah Saravia is a 53 year old female with a past medical history of obesity (BMI 53) admitted with N/V and R groin wound found to be in DKA at OSH.  She underwent a CT abdomen and pelvis that showed extensive soft tissue air throughout the right groin and inguinal region and extending to involve the right lower quadrant anterior abdominal wall with extensive soft tissue air also seen involving the proximal medial aspect of the right thigh, concerning for gas-forming infection. She is s/p OR debridement for necrotizing fascitis on 1/29/24 and take back for 1/31/24. Right groin tissue growing proteus, susceptibilities still pending. Currently on meropenem and clindamycin which was d/c'd on 1/31/24. While at OSH pt developed acute respiratory failure requiring intubation. Nephrology was consulted for worsening alvarado in the setting of lactic acidosis and septic shock likely ATN, sCr elevated at 3.8 on admit.  OR today for debridement with possible closure and wound vac placement. Last HD 2/1. Net -1.3L. Electrolytes stable. Nephrology consulted for ALVARADO.     Interval History: no acute events overnight. Tolerated SLED without issues    Review of patient's allergies indicates:  No Known Allergies  Current Facility-Administered Medications   Medication Frequency    0.9%  NaCl infusion (CRRT USE ONLY) Continuous    0.9%  NaCl infusion (for blood administration) Q24H PRN    0.9%  NaCl infusion PRN    0.9%  NaCl infusion Once    albuterol-ipratropium 2.5 mg-0.5 mg/3 mL nebulizer solution 3 mL Q4H PRN    amLODIPine tablet 10 mg Daily    carvediloL tablet 25 mg BID    cefTRIAXone (ROCEPHIN) 2 g  in dextrose 5 % in water (D5W) 100 mL IVPB (MB+) Q24H    dextrose 10 % infusion Continuous PRN    dextrose 10% bolus 125 mL 125 mL PRN    dextrose 10% bolus 250 mL 250 mL PRN    famotidine tablet 20 mg Daily    glucagon (human recombinant) injection 1 mg PRN    glucose chewable tablet 16 g PRN    glucose chewable tablet 24 g PRN    heparin (porcine) injection 7,500 Units Q8H    hydrALAZINE injection 10 mg Q4H PRN    insulin aspart U-100 pen 0-10 Units Q4H PRN    insulin aspart U-100 pen 6 Units Q4H    insulin detemir U-100 (Levemir) pen 12 Units Daily    labetalol 20 mg/4 mL (5 mg/mL) IV syring Q6H PRN    magnesium sulfate 2g in water 50mL IVPB (premix) PRN    metronidazole IVPB 500 mg Q8H    naloxone 0.4 mg/mL injection 0.02 mg PRN    ondansetron injection 4 mg Q6H PRN    oxyCODONE 5 mg/5 mL solution 10 mg Q6H PRN    oxyCODONE 5 mg/5 mL solution 5 mg Q6H PRN    prochlorperazine injection Soln 5 mg Q6H PRN    psyllium husk (aspartame) 3.4 gram packet 1 packet BID    sevelamer carbonate pwpk 1.6 g TID WM    sodium chloride 0.9% bolus 250 mL 250 mL PRN    sodium chloride 0.9% bolus 250 mL 250 mL PRN    sodium chloride 0.9% flush 10 mL PRN    sodium phosphate 20.01 mmol in dextrose 5 % (D5W) 250 mL IVPB PRN    sodium phosphate 30 mmol in dextrose 5 % (D5W) 250 mL IVPB PRN    sodium phosphate 39.99 mmol in dextrose 5 % (D5W) 250 mL IVPB PRN       Objective:     Vital Signs (Most Recent):  Temp: 98.7 °F (37.1 °C) (02/12/24 0701)  Pulse: 97 (02/12/24 0815)  Resp: (!) 25 (02/12/24 0815)  BP: (!) 173/76 (02/12/24 0815)  SpO2: 98 % (02/12/24 0815) Vital Signs (24h Range):  Temp:  [98.5 °F (36.9 °C)-99.1 °F (37.3 °C)] 98.7 °F (37.1 °C)  Pulse:  [] 97  Resp:  [16-29] 25  SpO2:  [97 %-99 %] 98 %  BP: (130-178)/(56-78) 173/76     Weight: (!) 153.3 kg (338 lb) (02/08/24 0300)  Body mass index is 56.25 kg/m².  Body surface area is 2.65 meters squared.    I/O last 3 completed shifts:  In: 3936.3 [I.V.:2268.6; Blood:350;  NG/GT:900; IV Piggyback:417.8]  Out: 3215 [Urine:1070; Other:2095; Stool:50]     Physical Exam  Vitals and nursing note reviewed.   Constitutional:       General: She is not in acute distress.     Appearance: She is obese. She is not ill-appearing or toxic-appearing.      Interventions: She is sedated and intubated.   Eyes:      General: No scleral icterus.        Right eye: No discharge.         Left eye: No discharge.   Cardiovascular:      Rate and Rhythm: Normal rate.   Pulmonary:      Effort: No respiratory distress. She is intubated.      Comments:       Abdominal:      General: There is distension.   Musculoskeletal:      Right lower leg: Edema present.      Left lower leg: Edema present.          Significant Labs:  All labs within the past 24 hours have been reviewed.     Significant Imaging:  Labs: Reviewed  Assessment/Plan:     Renal/  ALVARADO (acute kidney injury)  Transfer from [facility]. Admitted for fourniers gangrene. Initiated HD on 1/31. ALVARADO 2/2 ATN in setting of septic shock. Arrived with CR 3.8. Unknown baseline.  Plan to OR today. Net -1.3L.OR today for debridement with possible closure and wound vac placement     ALVARADO most likley ATN in setting of septic shock     Plan/Recommendation  -8 hour SLED with local citrate tonight for volume removal   -Daily RFP   -Strict I&Os  -Avoid nephrotoxins, if possible         Thank you for your consult. I will follow-up with patient. Please contact us if you have any additional questions.    Enrique Stokes MD  Nephrology  [facility]

## 2024-02-12 NOTE — SUBJECTIVE & OBJECTIVE
Interval History: Intubated, not sedated and not following commands. Met long-time boyfriend at bedside Mr. Gallegos. Supportive conversation held. Pursuing aggressive full supportive care.    Past Medical History:  T2DM  CKD3a    Past Surgical History:   Procedure Laterality Date    INCISION AND DRAINAGE OF PERIRECTAL REGION N/A 1/29/2024    Procedure: INCISION AND DRAINAGE, PERIRECTAL REGION;  Surgeon: Axel Ramsay MD;  Location: [facility] OR;  Service: General;  Laterality: N/A;    PLACEMENT, TRIALYSIS CATH Right 1/31/2024    Procedure: INSERTION, CATHETER, TRIPLE LUMEN, HEMODIALYSIS, TEMPORARY;  Surgeon: Alvin Junior MD;  Location: [facility] OR;  Service: General;  Laterality: Right;    WOUND DEBRIDEMENT Bilateral 2/2/2024    Procedure: DEBRIDEMENT, WOUND;  Surgeon: Steve Cole MD;  Location: [facility] OR 2ND FLR;  Service: General;  Laterality: Bilateral;  Bilateral groin  Possible wound vac placement    WOUND DEBRIDEMENT Right 2/6/2024    Procedure: DEBRIDEMENT, WOUND, replace wound vac, possible closure;  Surgeon: Steve Cole MD;  Location: [facility] OR 2ND FLR;  Service: General;  Laterality: Right;  RLE    WOUND DEBRIDEMENT Right 2/9/2024    Procedure: DEBRIDEMENT, WOUND w wound vac change;  Surgeon: Steve Cole MD;  Location: [facility] OR 2ND FLR;  Service: General;  Laterality: Right;  RLE    WOUND EXPLORATION Right 1/31/2024    Procedure: IRRIGATION & DEBRIDEMENT, WOUND DEBRIDEMENT;  Surgeon: Alvin Junior MD;  Location: [facility] OR;  Service: General;  Laterality: Right;       Review of patient's allergies indicates:  No Known Allergies    Medications:  Continuous Infusions:   sodium chloride 0.9% 200 mL/hr at 02/12/24 1100    calcium gluconate 3 g in dextrose 5 % (D5W) 100 mL infusion      dextrose 10 % in water (D10W)      dextrose-sod citrate-citric ac       Scheduled Meds:   sodium chloride 0.9%   Intravenous Once    amLODIPine  10 mg Per OG tube Daily    carvediloL  25 mg Per OG tube BID    cefTRIAXone  (Rocephin) IV (PEDS and ADULTS)  2 g Intravenous Q24H    famotidine  20 mg Per OG tube Daily    heparin (porcine)  7,500 Units Subcutaneous Q8H    insulin aspart U-100  6 Units Subcutaneous Q4H    insulin detemir U-100  12 Units Subcutaneous Daily    metronidazole  500 mg Intravenous Q8H    psyllium husk (aspartame)  1 packet Per OG tube BID    sevelamer carbonate  1.6 g Per OG tube TID WM     PRN Meds:0.9%  NaCl infusion (for blood administration), sodium chloride 0.9%, albuterol-ipratropium, dextrose 10 % in water (D10W), dextrose 10%, dextrose 10%, glucagon (human recombinant), glucose, glucose, hydrALAZINE, insulin aspart U-100, labetalol, magnesium sulfate IVPB, naloxone, ondansetron, oxyCODONE, oxyCODONE, prochlorperazine, sodium chloride 0.9%, sodium chloride 0.9%, sodium chloride 0.9%, sodium phosphate 20.01 mmol in dextrose 5 % (D5W) 250 mL IVPB, sodium phosphate 30 mmol in dextrose 5 % (D5W) 250 mL IVPB, sodium phosphate 39.99 mmol in dextrose 5 % (D5W) 250 mL IVPB    Family History    None       Tobacco Use    Smoking status: Not on file    Smokeless tobacco: Not on file   Substance and Sexual Activity    Alcohol use: Not on file    Drug use: Not on file    Sexual activity: Not on file       Review of Systems   Unable to perform ROS: Mental status change     Objective:     Vital Signs (Most Recent):  Temp: 98.7 °F (37.1 °C) (02/12/24 1101)  Pulse: 88 (02/12/24 1200)  Resp: (!) 27 (02/12/24 1200)  BP: (!) 143/57 (02/12/24 1145)  SpO2: 98 % (02/12/24 1200) Vital Signs (24h Range):  Temp:  [98.5 °F (36.9 °C)-99.1 °F (37.3 °C)] 98.7 °F (37.1 °C)  Pulse:  [] 88  Resp:  [16-29] 27  SpO2:  [97 %-100 %] 98 %  BP: (133-179)/(56-78) 143/57     Weight: (!) 153.3 kg (338 lb)  Body mass index is 56.25 kg/m².       Physical Exam  Constitutional:       Appearance: She is obese.   HENT:      Head: Normocephalic and atraumatic.      Right Ear: External ear normal.      Left Ear: External ear normal.      Nose: Nose  normal.      Mouth/Throat:      Mouth: Mucous membranes are dry.   Eyes:      Conjunctiva/sclera: Conjunctivae normal.   Cardiovascular:      Rate and Rhythm: Normal rate.   Pulmonary:      Breath sounds: Normal breath sounds.      Comments: On spontaneous breathing trial, ETT connected to ventilator  Abdominal:      General: Bowel sounds are normal.      Palpations: Abdomen is soft.   Musculoskeletal:         General: Swelling present.   Lymphadenopathy:      Cervical: No cervical adenopathy.   Skin:     General: Skin is warm and dry.   Neurological:      Comments: Not following commands despite no sedation            Review of Symptoms      Symptom Assessment (ESAS 0-10 Scale)  Unable to complete assessment due to Mental status change         Pain Assessment in Advanced Demential Scale (PAINAD)   Breathing - Independent of vocalization:  0  Negative vocalization:  0  Facial expression:  0  Body language:  0  Consolability:  0  Total:  0    Living Arrangements:  Lives with spouse    Psychosocial/Cultural:   See Palliative Psychosocial Note: No  Lives with long-time partner - not legally , together for 26 to 28 years. Has adult daughter (age 31) and adult son, who is in shelter. Has five grandchildren. Enjoyed watching movies, going out to eat, shopping. Worked as a patient caregiver for blind patients.  **Primary  to Follow**  Palliative Care  Consult: No    Spiritual:  F - Belen and Belief:  Believes in God  I - Importance:  Important  C - Community:  Does not attend Gnosticism  A - Address in Care:  Engage  support        Advance Care Planning   Advance Directives:   Living Will: No    LaPOST: No    Do Not Resuscitate Status: No    Medical Power of : No      Decision Making:  Family answered questions  Goals of Care: What is most important right now is to focus on curative/life-prolongation (regardless of treatment burdens). Accordingly, we have decided that the best plan  to meet the patient's goals includes continuing with treatment.         Significant Labs: CBC:   Recent Labs   Lab 02/11/24  0305 02/11/24  1458 02/12/24 0252   WBC 12.07 12.56 12.59   HGB 6.4* 7.4* 7.8*   HCT 19.3* 22.9* 24.2*    417 380       CMP:   Recent Labs   Lab 02/11/24  0305 02/11/24  1458 02/11/24 2117 02/12/24  0252   * 133* 135* 134*   K 4.4 4.3 4.3 4.3    104 105 103   CO2 18* 21* 21* 23   * 167* 158* 128*   BUN 51* 48* 36* 28*   CREATININE 4.0* 3.1* 2.6* 1.9*   CALCIUM 8.2* 8.2* 8.0* 8.2*   PROT 6.7  --   --  7.3   ALBUMIN 1.8* 1.9* 1.8* 1.9*   BILITOT 0.2  --   --  0.3   ALKPHOS 114  --   --  132   AST 32  --   --  31   ALT 13  --   --  14   ANIONGAP 10 8 9 8       CBC:   Recent Labs   Lab 02/12/24 0252   WBC 12.59   HGB 7.8*   HCT 24.2*   MCV 84          BMP:  Recent Labs   Lab 02/12/24 0252   *   *   K 4.3      CO2 23   BUN 28*   CREATININE 1.9*   CALCIUM 8.2*   MG 2.0       LFT:  Lab Results   Component Value Date    AST 31 02/12/2024    ALKPHOS 132 02/12/2024    BILITOT 0.3 02/12/2024     Albumin:   Albumin   Date Value Ref Range Status   02/12/2024 1.9 (L) 3.5 - 5.2 g/dL Final     Protein:   Total Protein   Date Value Ref Range Status   02/12/2024 7.3 6.0 - 8.4 g/dL Final     Lactic acid:   Lab Results   Component Value Date    LACTATE 1.5 02/01/2024    LACTATE 2.4 (H) 01/31/2024       Significant Imaging: I have reviewed all pertinent imaging results/findings within the past 24 hours.

## 2024-02-12 NOTE — ASSESSMENT & PLAN NOTE
Lab Results   Component Value Date    CREATININE 1.9 (H) 02/12/2024     Avoid insulin stacking  Titrate insulin slowly

## 2024-02-13 LAB
ALBUMIN SERPL BCP-MCNC: 1.8 G/DL (ref 3.5–5.2)
ALP SERPL-CCNC: 130 U/L (ref 55–135)
ALT SERPL W/O P-5'-P-CCNC: 14 U/L (ref 10–44)
ANION GAP SERPL CALC-SCNC: 6 MMOL/L (ref 8–16)
ANION GAP SERPL CALC-SCNC: 6 MMOL/L (ref 8–16)
ANION GAP SERPL CALC-SCNC: 7 MMOL/L (ref 8–16)
ANION GAP SERPL CALC-SCNC: 8 MMOL/L (ref 8–16)
AST SERPL-CCNC: 25 U/L (ref 10–40)
BASOPHILS # BLD AUTO: 0.05 K/UL (ref 0–0.2)
BASOPHILS # BLD AUTO: 0.06 K/UL (ref 0–0.2)
BASOPHILS NFR BLD: 0.4 % (ref 0–1.9)
BASOPHILS NFR BLD: 0.4 % (ref 0–1.9)
BILIRUB SERPL-MCNC: 0.3 MG/DL (ref 0.1–1)
BUN SERPL-MCNC: 15 MG/DL (ref 6–20)
BUN SERPL-MCNC: 19 MG/DL (ref 6–20)
BUN SERPL-MCNC: 19 MG/DL (ref 6–20)
BUN SERPL-MCNC: 23 MG/DL (ref 6–20)
CA-I BLDV-SCNC: 1.04 MMOL/L (ref 1.06–1.42)
CA-I BLDV-SCNC: 1.09 MMOL/L (ref 1.06–1.42)
CA-I BLDV-SCNC: 1.1 MMOL/L (ref 1.06–1.42)
CALCIUM SERPL-MCNC: 8 MG/DL (ref 8.7–10.5)
CALCIUM SERPL-MCNC: 8.1 MG/DL (ref 8.7–10.5)
CALCIUM SERPL-MCNC: 8.1 MG/DL (ref 8.7–10.5)
CALCIUM SERPL-MCNC: 8.3 MG/DL (ref 8.7–10.5)
CHLORIDE SERPL-SCNC: 104 MMOL/L (ref 95–110)
CO2 SERPL-SCNC: 22 MMOL/L (ref 23–29)
CO2 SERPL-SCNC: 23 MMOL/L (ref 23–29)
CO2 SERPL-SCNC: 23 MMOL/L (ref 23–29)
CO2 SERPL-SCNC: 24 MMOL/L (ref 23–29)
CREAT SERPL-MCNC: 1.1 MG/DL (ref 0.5–1.4)
CREAT SERPL-MCNC: 2 MG/DL (ref 0.5–1.4)
CREAT SERPL-MCNC: 2 MG/DL (ref 0.5–1.4)
CREAT SERPL-MCNC: 2.5 MG/DL (ref 0.5–1.4)
DIFFERENTIAL METHOD BLD: ABNORMAL
DIFFERENTIAL METHOD BLD: ABNORMAL
EOSINOPHIL # BLD AUTO: 0.2 K/UL (ref 0–0.5)
EOSINOPHIL # BLD AUTO: 0.3 K/UL (ref 0–0.5)
EOSINOPHIL NFR BLD: 1.7 % (ref 0–8)
EOSINOPHIL NFR BLD: 1.7 % (ref 0–8)
ERYTHROCYTE [DISTWIDTH] IN BLOOD BY AUTOMATED COUNT: 17.7 % (ref 11.5–14.5)
ERYTHROCYTE [DISTWIDTH] IN BLOOD BY AUTOMATED COUNT: 18.5 % (ref 11.5–14.5)
EST. GFR  (NO RACE VARIABLE): 22.4 ML/MIN/1.73 M^2
EST. GFR  (NO RACE VARIABLE): 29.3 ML/MIN/1.73 M^2
EST. GFR  (NO RACE VARIABLE): 29.3 ML/MIN/1.73 M^2
EST. GFR  (NO RACE VARIABLE): >60 ML/MIN/1.73 M^2
GLUCOSE SERPL-MCNC: 155 MG/DL (ref 70–110)
GLUCOSE SERPL-MCNC: 178 MG/DL (ref 70–110)
GLUCOSE SERPL-MCNC: 197 MG/DL (ref 70–110)
GLUCOSE SERPL-MCNC: 197 MG/DL (ref 70–110)
HCT VFR BLD AUTO: 23.3 % (ref 37–48.5)
HCT VFR BLD AUTO: 24.7 % (ref 37–48.5)
HGB BLD-MCNC: 7.4 G/DL (ref 12–16)
HGB BLD-MCNC: 7.7 G/DL (ref 12–16)
IMM GRANULOCYTES # BLD AUTO: 0.11 K/UL (ref 0–0.04)
IMM GRANULOCYTES # BLD AUTO: 0.13 K/UL (ref 0–0.04)
IMM GRANULOCYTES NFR BLD AUTO: 0.8 % (ref 0–0.5)
IMM GRANULOCYTES NFR BLD AUTO: 1 % (ref 0–0.5)
LYMPHOCYTES # BLD AUTO: 1.4 K/UL (ref 1–4.8)
LYMPHOCYTES # BLD AUTO: 1.7 K/UL (ref 1–4.8)
LYMPHOCYTES NFR BLD: 10.7 % (ref 18–48)
LYMPHOCYTES NFR BLD: 11.3 % (ref 18–48)
MAGNESIUM SERPL-MCNC: 2 MG/DL (ref 1.6–2.6)
MAGNESIUM SERPL-MCNC: 2.1 MG/DL (ref 1.6–2.6)
MAGNESIUM SERPL-MCNC: 2.1 MG/DL (ref 1.6–2.6)
MCH RBC QN AUTO: 26.9 PG (ref 27–31)
MCH RBC QN AUTO: 27.1 PG (ref 27–31)
MCHC RBC AUTO-ENTMCNC: 31.2 G/DL (ref 32–36)
MCHC RBC AUTO-ENTMCNC: 31.8 G/DL (ref 32–36)
MCV RBC AUTO: 85 FL (ref 82–98)
MCV RBC AUTO: 86 FL (ref 82–98)
MONOCYTES # BLD AUTO: 1.6 K/UL (ref 0.3–1)
MONOCYTES # BLD AUTO: 1.8 K/UL (ref 0.3–1)
MONOCYTES NFR BLD: 12.1 % (ref 4–15)
MONOCYTES NFR BLD: 12.4 % (ref 4–15)
NEUTROPHILS # BLD AUTO: 10.7 K/UL (ref 1.8–7.7)
NEUTROPHILS # BLD AUTO: 9.7 K/UL (ref 1.8–7.7)
NEUTROPHILS NFR BLD: 73.4 % (ref 38–73)
NEUTROPHILS NFR BLD: 74.1 % (ref 38–73)
NRBC BLD-RTO: 0 /100 WBC
NRBC BLD-RTO: 0 /100 WBC
OHS QRS DURATION: 148 MS
OHS QTC CALCULATION: 490 MS
PHOSPHATE SERPL-MCNC: 2.1 MG/DL (ref 2.7–4.5)
PHOSPHATE SERPL-MCNC: 4.7 MG/DL (ref 2.7–4.5)
PHOSPHATE SERPL-MCNC: 4.9 MG/DL (ref 2.7–4.5)
PLATELET # BLD AUTO: 361 K/UL (ref 150–450)
PLATELET # BLD AUTO: 365 K/UL (ref 150–450)
PMV BLD AUTO: 9.6 FL (ref 9.2–12.9)
PMV BLD AUTO: 9.9 FL (ref 9.2–12.9)
POCT GLUCOSE: 158 MG/DL (ref 70–110)
POCT GLUCOSE: 158 MG/DL (ref 70–110)
POCT GLUCOSE: 173 MG/DL (ref 70–110)
POCT GLUCOSE: 183 MG/DL (ref 70–110)
POCT GLUCOSE: 183 MG/DL (ref 70–110)
POCT GLUCOSE: 198 MG/DL (ref 70–110)
POCT GLUCOSE: 206 MG/DL (ref 70–110)
POCT GLUCOSE: 220 MG/DL (ref 70–110)
POTASSIUM SERPL-SCNC: 4.4 MMOL/L (ref 3.5–5.1)
POTASSIUM SERPL-SCNC: 4.5 MMOL/L (ref 3.5–5.1)
POTASSIUM SERPL-SCNC: 4.5 MMOL/L (ref 3.5–5.1)
POTASSIUM SERPL-SCNC: 4.8 MMOL/L (ref 3.5–5.1)
PROT SERPL-MCNC: 7.1 G/DL (ref 6–8.4)
RBC # BLD AUTO: 2.73 M/UL (ref 4–5.4)
RBC # BLD AUTO: 2.86 M/UL (ref 4–5.4)
SODIUM SERPL-SCNC: 133 MMOL/L (ref 136–145)
SODIUM SERPL-SCNC: 133 MMOL/L (ref 136–145)
SODIUM SERPL-SCNC: 134 MMOL/L (ref 136–145)
SODIUM SERPL-SCNC: 135 MMOL/L (ref 136–145)
TROPONIN I SERPL DL<=0.01 NG/ML-MCNC: 0.02 NG/ML (ref 0–0.03)
WBC # BLD AUTO: 13.13 K/UL (ref 3.9–12.7)
WBC # BLD AUTO: 14.64 K/UL (ref 3.9–12.7)

## 2024-02-13 PROCEDURE — 27000923 HC TRIALYSIS CATHETER, ANY SIZE

## 2024-02-13 PROCEDURE — 84484 ASSAY OF TROPONIN QUANT: CPT

## 2024-02-13 PROCEDURE — 85025 COMPLETE CBC W/AUTO DIFF WBC: CPT | Mod: 91

## 2024-02-13 PROCEDURE — 94003 VENT MGMT INPAT SUBQ DAY: CPT

## 2024-02-13 PROCEDURE — 99900026 HC AIRWAY MAINTENANCE (STAT)

## 2024-02-13 PROCEDURE — 25000003 PHARM REV CODE 250: Performed by: STUDENT IN AN ORGANIZED HEALTH CARE EDUCATION/TRAINING PROGRAM

## 2024-02-13 PROCEDURE — 25000242 PHARM REV CODE 250 ALT 637 W/ HCPCS

## 2024-02-13 PROCEDURE — 86850 RBC ANTIBODY SCREEN: CPT

## 2024-02-13 PROCEDURE — 63600175 PHARM REV CODE 636 W HCPCS: Performed by: HOSPITALIST

## 2024-02-13 PROCEDURE — 83735 ASSAY OF MAGNESIUM: CPT | Mod: 91 | Performed by: STUDENT IN AN ORGANIZED HEALTH CARE EDUCATION/TRAINING PROGRAM

## 2024-02-13 PROCEDURE — 84100 ASSAY OF PHOSPHORUS: CPT

## 2024-02-13 PROCEDURE — 99291 CRITICAL CARE FIRST HOUR: CPT | Mod: ,,, | Performed by: SURGERY

## 2024-02-13 PROCEDURE — 94761 N-INVAS EAR/PLS OXIMETRY MLT: CPT

## 2024-02-13 PROCEDURE — 80053 COMPREHEN METABOLIC PANEL: CPT

## 2024-02-13 PROCEDURE — 82330 ASSAY OF CALCIUM: CPT | Mod: 91 | Performed by: STUDENT IN AN ORGANIZED HEALTH CARE EDUCATION/TRAINING PROGRAM

## 2024-02-13 PROCEDURE — 83735 ASSAY OF MAGNESIUM: CPT

## 2024-02-13 PROCEDURE — 27100171 HC OXYGEN HIGH FLOW UP TO 24 HOURS

## 2024-02-13 PROCEDURE — 99232 SBSQ HOSP IP/OBS MODERATE 35: CPT | Mod: ,,, | Performed by: PHYSICIAN ASSISTANT

## 2024-02-13 PROCEDURE — 63600175 PHARM REV CODE 636 W HCPCS

## 2024-02-13 PROCEDURE — 80069 RENAL FUNCTION PANEL: CPT | Performed by: STUDENT IN AN ORGANIZED HEALTH CARE EDUCATION/TRAINING PROGRAM

## 2024-02-13 PROCEDURE — 99900035 HC TECH TIME PER 15 MIN (STAT)

## 2024-02-13 PROCEDURE — 63600175 PHARM REV CODE 636 W HCPCS: Mod: JZ,JG | Performed by: INTERNAL MEDICINE

## 2024-02-13 PROCEDURE — 80069 RENAL FUNCTION PANEL: CPT | Mod: 91 | Performed by: STUDENT IN AN ORGANIZED HEALTH CARE EDUCATION/TRAINING PROGRAM

## 2024-02-13 PROCEDURE — 25000003 PHARM REV CODE 250

## 2024-02-13 PROCEDURE — 84100 ASSAY OF PHOSPHORUS: CPT | Mod: 91

## 2024-02-13 PROCEDURE — 63600175 PHARM REV CODE 636 W HCPCS: Performed by: PHYSICIAN ASSISTANT

## 2024-02-13 PROCEDURE — 20000000 HC ICU ROOM

## 2024-02-13 PROCEDURE — 25000003 PHARM REV CODE 250: Mod: JZ,JG

## 2024-02-13 PROCEDURE — 99233 SBSQ HOSP IP/OBS HIGH 50: CPT | Mod: ,,, | Performed by: INTERNAL MEDICINE

## 2024-02-13 PROCEDURE — 63600175 PHARM REV CODE 636 W HCPCS: Performed by: STUDENT IN AN ORGANIZED HEALTH CARE EDUCATION/TRAINING PROGRAM

## 2024-02-13 RX ORDER — CALCIUM GLUCONATE 20 MG/ML
1 INJECTION, SOLUTION INTRAVENOUS ONCE
Status: COMPLETED | OUTPATIENT
Start: 2024-02-13 | End: 2024-02-13

## 2024-02-13 RX ORDER — ACETAMINOPHEN 325 MG/1
650 TABLET ORAL EVERY 4 HOURS PRN
Status: DISCONTINUED | OUTPATIENT
Start: 2024-02-13 | End: 2024-02-22

## 2024-02-13 RX ADMIN — HYDRALAZINE HYDROCHLORIDE 10 MG: 20 INJECTION, SOLUTION INTRAMUSCULAR; INTRAVENOUS at 06:02

## 2024-02-13 RX ADMIN — INSULIN ASPART 6 UNITS: 100 INJECTION, SOLUTION INTRAVENOUS; SUBCUTANEOUS at 11:02

## 2024-02-13 RX ADMIN — HEPARIN SODIUM 7500 UNITS: 5000 INJECTION INTRAVENOUS; SUBCUTANEOUS at 09:02

## 2024-02-13 RX ADMIN — CALCIUM GLUCONATE 1 G: 20 INJECTION, SOLUTION INTRAVENOUS at 04:02

## 2024-02-13 RX ADMIN — PSYLLIUM HUSK 1 PACKET: 3.4 POWDER ORAL at 08:02

## 2024-02-13 RX ADMIN — PSYLLIUM HUSK 1 PACKET: 3.4 POWDER ORAL at 09:02

## 2024-02-13 RX ADMIN — CARVEDILOL 25 MG: 25 TABLET, FILM COATED ORAL at 09:02

## 2024-02-13 RX ADMIN — HEPARIN SODIUM 7500 UNITS: 5000 INJECTION INTRAVENOUS; SUBCUTANEOUS at 01:02

## 2024-02-13 RX ADMIN — CEFTRIAXONE 2 G: 2 INJECTION, POWDER, FOR SOLUTION INTRAMUSCULAR; INTRAVENOUS at 09:02

## 2024-02-13 RX ADMIN — FAMOTIDINE 20 MG: 20 TABLET, FILM COATED ORAL at 09:02

## 2024-02-13 RX ADMIN — INSULIN DETEMIR 14 UNITS: 100 INJECTION, SOLUTION SUBCUTANEOUS at 09:02

## 2024-02-13 RX ADMIN — INSULIN ASPART 2 UNITS: 100 INJECTION, SOLUTION INTRAVENOUS; SUBCUTANEOUS at 03:02

## 2024-02-13 RX ADMIN — INSULIN ASPART 1 UNITS: 100 INJECTION, SOLUTION INTRAVENOUS; SUBCUTANEOUS at 12:02

## 2024-02-13 RX ADMIN — INSULIN ASPART 1 UNITS: 100 INJECTION, SOLUTION INTRAVENOUS; SUBCUTANEOUS at 07:02

## 2024-02-13 RX ADMIN — INSULIN ASPART 6 UNITS: 100 INJECTION, SOLUTION INTRAVENOUS; SUBCUTANEOUS at 03:02

## 2024-02-13 RX ADMIN — METRONIDAZOLE 500 MG: 5 INJECTION, SOLUTION INTRAVENOUS at 06:02

## 2024-02-13 RX ADMIN — HEPARIN SODIUM 7500 UNITS: 5000 INJECTION INTRAVENOUS; SUBCUTANEOUS at 05:02

## 2024-02-13 RX ADMIN — CEFEPIME 2 G: 2 INJECTION, POWDER, FOR SOLUTION INTRAVENOUS at 11:02

## 2024-02-13 RX ADMIN — INSULIN ASPART 6 UNITS: 100 INJECTION, SOLUTION INTRAVENOUS; SUBCUTANEOUS at 12:02

## 2024-02-13 RX ADMIN — ACETAMINOPHEN 650 MG: 325 TABLET ORAL at 09:02

## 2024-02-13 RX ADMIN — INSULIN ASPART 6 UNITS: 100 INJECTION, SOLUTION INTRAVENOUS; SUBCUTANEOUS at 07:02

## 2024-02-13 RX ADMIN — SODIUM PHOSPHATE, MONOBASIC, MONOHYDRATE AND SODIUM PHOSPHATE, DIBASIC, ANHYDROUS 30 MMOL: 142; 276 INJECTION, SOLUTION INTRAVENOUS at 05:02

## 2024-02-13 RX ADMIN — AMLODIPINE BESYLATE 10 MG: 10 TABLET ORAL at 09:02

## 2024-02-13 RX ADMIN — INSULIN ASPART 2 UNITS: 100 INJECTION, SOLUTION INTRAVENOUS; SUBCUTANEOUS at 07:02

## 2024-02-13 RX ADMIN — METRONIDAZOLE 500 MG: 5 INJECTION, SOLUTION INTRAVENOUS at 10:02

## 2024-02-13 RX ADMIN — INSULIN ASPART 1 UNITS: 100 INJECTION, SOLUTION INTRAVENOUS; SUBCUTANEOUS at 11:02

## 2024-02-13 RX ADMIN — SEVELAMER CARBONATE 1.6 G: 800 POWDER, FOR SUSPENSION ORAL at 12:02

## 2024-02-13 RX ADMIN — INSULIN ASPART 4 UNITS: 100 INJECTION, SOLUTION INTRAVENOUS; SUBCUTANEOUS at 12:02

## 2024-02-13 RX ADMIN — MAGNESIUM SULFATE HEPTAHYDRATE 2 G: 40 INJECTION, SOLUTION INTRAVENOUS at 01:02

## 2024-02-13 RX ADMIN — METRONIDAZOLE 500 MG: 5 INJECTION, SOLUTION INTRAVENOUS at 02:02

## 2024-02-13 RX ADMIN — CARVEDILOL 25 MG: 25 TABLET, FILM COATED ORAL at 08:02

## 2024-02-13 RX ADMIN — SEVELAMER CARBONATE 1.6 G: 800 POWDER, FOR SUSPENSION ORAL at 04:02

## 2024-02-13 NOTE — PROGRESS NOTES
[facility]  Critical Care - Surgery  Progress Note    Patient Name: Sarah Saravia  MRN: [id number]  Admission Date: 1/29/2024  Hospital Length of Stay: 15 days  Code Status: Full Code  Attending Provider: Steve Cole MD  [facility]   Principal Problem: Ayaz's gangrene    Subjective:   Interval History/Significant Events: s/p wound debridement and wound vac exchange. Tfs restarted. Noted to have ST elevations and subsequent EKG with new RBB. Received SLED overnight. Otherwise HDS.     Follow-up For: Procedure(s) (LRB):  REPLACEMENT, WOUND VAC (Right)  R thigh wound debridement (Right)    Post-Operative Day: 1 Day Post-Op    Objective:     Vital Signs (Most Recent):  Temp: 98.6 °F (37 °C) (02/13/24 0701)  Pulse: 101 (02/13/24 0715)  Resp: (!) 31 (02/13/24 0715)  BP: (!) 163/68 (02/13/24 0715)  SpO2: 100 % (02/13/24 0715) Vital Signs (24h Range):  Temp:  [98.6 °F (37 °C)-99.5 °F (37.5 °C)] 98.6 °F (37 °C)  Pulse:  [] 101  Resp:  [15-31] 31  SpO2:  [98 %-100 %] 100 %  BP: (120-179)/(57-80) 163/68     Weight: (!) 153.3 kg (338 lb)  Body mass index is 56.25 kg/m².      Intake/Output Summary (Last 24 hours) at 2/13/2024 0820  Last data filed at 2/13/2024 0701  Gross per 24 hour   Intake 2397.74 ml   Output 4587 ml   Net -2189.26 ml          Constitutional:       Appearance: She is obese.   HENT:      Head: Normocephalic.      Mouth/Throat:      Mouth: Mucous membranes are moist.   Eyes:      Comments: PERRL, no gaze  Neck:      Comments: LIJ Trialysis  Cardiovascular:      Rate and Rhythm: Normal rate and regular rhythm.   Pulmonary:      Effort: Pulmonary effort is normal.      Comments: Intubated  Abdominal:      General: Abdomen is flat.      Palpations: Abdomen is soft.   Genitourinary:     Comments: R groin wound   Glynn in place  Rectal tube  Musculoskeletal:       BLE nonedematous   Skin:     General: Skin is warm.    Neurological:      Comments: Opens eyes but does not withdraw, track, or respond to pain.       Vents:  Vent Mode: Spont (02/13/24 0350)  Set Rate: 18 BPM (02/06/24 0349)  Vt Set: 420 mL (02/06/24 0349)  Pressure Support: 10 cmH20 (02/13/24 0350)  PEEP/CPAP: 5 cmH20 (02/13/24 0350)  Oxygen Concentration (%): 40 (02/13/24 0350)  Peak Airway Pressure: 16 cmH20 (02/13/24 0350)  Plateau Pressure: 15 cmH20 (02/13/24 0350)  Total Ve: 10.1 L/m (02/13/24 0350)  Negative Inspiratory Force (cm H2O): 0 (02/12/24 1222)  F/VT Ratio<105 (RSBI): (!) 65.57 (02/13/24 0350)    Lines/Drains/Airways       Central Venous Catheter Line  Duration             Trialysis (Dialysis) Catheter 02/11/24 2303 left internal jugular 1 day              Drain  Duration                  Rectal Tube 02/04/24 1545 8 days         NG/OG Tube 02/06/24 1030 Center mouth 6 days         Urethral Catheter 02/06/24 1900 6 days              Airway  Duration                  Airway - Non-Surgical 01/31/24 1020 12 days              Peripheral Intravenous Line  Duration                  Peripheral IV - Single Lumen 02/09/24 0130 18 G Anterior;Right Forearm 4 days         Peripheral IV - Single Lumen 02/13/24 0548 18 G Left Antecubital <1 day                    Significant Labs:    CBC/Anemia Profile:  Recent Labs   Lab 02/11/24  1458 02/12/24  0252 02/13/24  0301   WBC 12.56 12.59 13.13*   HGB 7.4* 7.8* 7.7*   HCT 22.9* 24.2* 24.7*    380 365   MCV 86 84 86   RDW 17.3* 17.2* 17.7*        Chemistries:  Recent Labs   Lab 02/12/24  0252 02/12/24  1714 02/12/24  2143 02/13/24  0301   * 134*  134* 134* 135*   K 4.3 4.8  4.8 4.8 4.4    103  103 105 104   CO2 23 23  23 22* 24   BUN 28* 36*  36* 28* 15   CREATININE 1.9* 2.6*  2.6* 1.9* 1.1   CALCIUM 8.2* 8.5*  8.5* 8.0* 8.0*   ALBUMIN 1.9* 1.9*  1.9* 1.8* 1.8*   PROT 7.3  --   --  7.1   BILITOT 0.3  --   --  0.3   ALKPHOS 132  --   --  130   ALT 14  --   --  14   AST 31  --   --  25   MG 2.0  2.0  2.0 1.8 2.0   PHOS 3.7 5.9*  5.9* 3.8 2.1*           Significant Imaging:  N/A  Assessment/Plan:     Renal/  * Ayaz's gangrene    Neuro/Psych:   #Acute Encephalopathy  CTH 2/3 with scattered hypoattenuation likely representing age indeterminate infarcts. EEG findings consistent with moderate-severe encephalopathy. MRI 2/6: Several scattered punctate acute infarcts throughout the bilateral frontal lobes, centrum semiovale, basal ganglia, corpus callosum, and cerebellar hemispheres.  CTA 2/6: Atherosclerotic plaquing of the carotid bifurcations and proximal ICAs with less than 50% proximal ICA stenosis by NASCET criteria . Repeat CTH and MRI/MRA unchanged from prior exams. Has currently been wound vac/debridement x3 and due for OR this AM.    Neuro/Psych  Repeat CTH and MRI/MRA stable from initial reads.   -- Sedation: None  -- Pain: kermit tylenol, dialudid and oxy prn  -- GCS 4T: E2, V1T, M1; exam stable  -- Neurology following; appreciate recs  -- Neurovascular following; appreciate recs  -- Palliative following; GOC conversation 2/7; appreciate recs  -- LP ordered. Neuro will perform under fluoro guidance.               Cards:   #New Onset RBBB  -- F/u troponin  -- HDS  -- goal SBP < 180, MAP >65  -- hypertensive, hydralazine and labetalol prns  -- Continue amlodipine 10mg daily; coreg 25mg BID      Pulm:   #Acute Respiratory Failure  -- Intubated on spontaneous , Pressure support.  -- Goal O2 sat > 90%      Renal:  #ALVARADO on CKD  #ATN  Received HD 2/9. 2/13 RIJ trialysis removed and replaced with LIJ trialysis  -- Completed SLED overnight   -- Nephrology following; appreciate recs  -- RRT as needed    Recent Labs   Lab 02/12/24  1714 02/12/24  2143 02/13/24  0301   BUN 36*  36* 28* 15   CREATININE 2.6*  2.6* 1.9* 1.1        FEN / GI:   -- Daily CMPs  -- NGT in place   -- Replace lytes as needed  -- Nutrition: NPO, TF held. Will restart (Novasource Renal) at goal 30 ml/hr after OR  -- GI PPX   --  Nutrition following; appreciate recs  -- Continue psyllium      ID:    #Ayaz's gangrene  s/p wound vac exchange/ debridement x4 for nec fascitis. R groin tissue with proteus, sensitive to ceftriaxone. Growing bacteroids as well.   -- Abx: ceftriaxone and flagyl EOT 2/22  -- ID following ; appreciate recs    Recent Labs   Lab 02/11/24  1458 02/12/24  0252 02/13/24  0301   WBC 12.56 12.59 13.13*        Heme/Onc:  -- Daily CBC  -- Heparin DVT ppx    Recent Labs   Lab 02/11/24  1458 02/12/24  0252 02/13/24  0301   HGB 7.4* 7.8* 7.7*    380 365        Endo:   #IDDM  Initially presented to OSH with DKA with latest A1C 10.4 AG Gap resolved  Placed on insulin gtt 2/3 and dced 2/12.    - q4h BG monitoring while NPO  - Detemir 12 units daily  - Novolog 6units q4h while on TFs  - Moderate dose SSI PRN  - Endocrine following, appreciate recs      PPx:   Feeding: NPO  Analgesia/Sedation: See above  Thromboembolic prevention: SQH   HOB >30: Y  Stress Ulcer ppx: Famotidine  Glucose control: Goal 140-180 g/dl, kermit detemir and novolog, SSI, Endo following  Bowel reg: psyllium  Invasive Lines/Drains/Airway: Glynn, ETT, LIJ Trialysis, PIV x2, Rectal tube      Dispo/Code Status/Palliative:   -- SICU / Full Code          David Stokes MD  Critical Care - Surgery  [facility]

## 2024-02-13 NOTE — ASSESSMENT & PLAN NOTE
Lab Results   Component Value Date    CREATININE 1.1 02/13/2024     Avoid insulin stacking  Titrate insulin slowly

## 2024-02-13 NOTE — PLAN OF CARE
"      SICU PLAN OF CARE NOTE    Dx: Ayaz's gangrene    Shift Events: Patient to OR today for wound vac exchange. Hydralazine given x1 to maintain BP goals. Tube feedings restarted upon patient return. Insulin gtt stopped per orders. Plan for CRRT tonight and lumbar puncture on Wednesday. Neuro assessment remained unchanged throughout shift.     Goals of Care: SBP < 170    Neuro: Arouses to Voice    Vital Signs: BP (!) 152/72 (BP Location: Left arm, Patient Position: Lying)   Pulse 91   Temp 98.7 °F (37.1 °C) (Oral)   Resp 19   Ht 5' 5" (1.651 m)   Wt (!) 153.3 kg (338 lb)   LMP 01/01/2024 (Approximate) Comment: tubal ligation  SpO2 100%   BMI 56.25 kg/m²     Respiratory: Ventilator    Diet: Tube Feeds    Urine Output: Voids Spontaneously 100 cc/shift    Drains:     Wound Vac 100 cc shift      cc shift    Labs/Accuchecks: Daily labs, accucheck q4hrs.    Skin: No evidence of new skin breakdown throughout shift.       "

## 2024-02-13 NOTE — PROGRESS NOTES
"[facility]  Endocrinology  Progress Note    Admit Date: 1/29/2024     Reason for Consult: Management of T2DM, Hyperglycemia     Surgical Procedure and Date: n/a    Diabetes diagnosis year: new onset     Home Diabetes Medications:  none per chart review and family present at bedside     Lab Results   Component Value Date    HGBA1C 10.4 (H) 01/30/2024       Diabetes Complications include:     Hyperglycemia, DKA at OSH     Complicating diabetes co morbidities:   Obesity       HPI:   Patient is a 53 y.o. female with a diagnosis of obesity (BMI 53) admitted with N/V and R groin wound found to be in DKA at OSH. 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 While at OSH pt developed acute respiratory failure requiring intubation. Pulmonology was consulted for ventilator management and critical illness. Nephrology was consulted for worsening vishal in the setting of lactic acidosis and septic shock likely ATN, sCr elevated at 3.8 on admit now 4.0 post HD. Pt being admitted to SICU for surgical critical care management. Immediate plans include hemodynamic stabilization, weaning of respiratory support, and RRT.  Endocrinology consulted for management of T2DM.                 Interval HPI:   S/p wound debridement and wound vac exchange . Patient in room 87209/60897 A. Blood glucose stable. BG at goal on current insulin regimen (Basal, SSI, prandial). Steroid use- None .   Renal function- Abnormal -   Vasopressors-  None      Diet NPO      Eating:   NPO  Nausea: No  Hypoglycemia and intervention: No  Fever: No  TPN and/or TF: Yes 30 cc/hr (on hold for procedure)    BP (!) 163/68   Pulse 101   Temp 98.6 °F (37 °C) (Oral)   Resp (!) 31   Ht 5' 5" (1.651 m)   Wt (!) 153.3 kg (338 lb)   LMP 01/01/2024 (Approximate) Comment: tubal ligation  SpO2 100%   BMI 56.25 kg/m²     Labs Reviewed and Include    Recent " "Labs   Lab 02/13/24  0301   *   CALCIUM 8.0*   ALBUMIN 1.8*   PROT 7.1   *   K 4.4   CO2 24      BUN 15   CREATININE 1.1   ALKPHOS 130   ALT 14   AST 25   BILITOT 0.3     Lab Results   Component Value Date    WBC 13.13 (H) 02/13/2024    HGB 7.7 (L) 02/13/2024    HCT 24.7 (L) 02/13/2024    MCV 86 02/13/2024     02/13/2024     No results for input(s): "TSH", "FREET4" in the last 168 hours.  Lab Results   Component Value Date    HGBA1C 10.4 (H) 01/30/2024       Nutritional status:   Body mass index is 56.25 kg/m².  Lab Results   Component Value Date    ALBUMIN 1.8 (L) 02/13/2024    ALBUMIN 1.8 (L) 02/12/2024    ALBUMIN 1.9 (L) 02/12/2024    ALBUMIN 1.9 (L) 02/12/2024     No results found for: "PREALBUMIN"    Estimated Creatinine Clearance: 89.2 mL/min (based on SCr of 1.1 mg/dL).    Accu-Checks  Recent Labs     02/11/24  2336 02/12/24  0308 02/12/24  0711 02/12/24  1159 02/12/24  1527 02/12/24  1715 02/12/24  1922 02/13/24  0039 02/13/24  0301 02/13/24  0748   POCTGLUCOSE 165* 130* 139* 162* 142* 187* 170* 158* 158* 198*       Current Medications and/or Treatments Impacting Glycemic Control  Immunotherapy:    Immunosuppressants       None          Steroids:   Hormones (From admission, onward)      None          Pressors:    Autonomic Drugs (From admission, onward)      None          Hyperglycemia/Diabetes Medications:   Antihyperglycemics (From admission, onward)      Start     Stop Route Frequency Ordered    02/13/24 0915  insulin detemir U-100 (Levemir) pen 14 Units         -- SubQ Daily 02/13/24 0906    02/09/24 1200  insulin aspart U-100 pen 6 Units         -- SubQ Every 4 hours 02/09/24 0901    02/03/24 1010  insulin aspart U-100 pen 0-10 Units         -- SubQ Every 4 hours PRN 02/03/24 0911            ASSESSMENT and PLAN    Renal/  * Ayaz's gangrene  Managed per primary team  Optimize BG control        ALVARADO (acute kidney injury)  Lab Results   Component Value Date    CREATININE 1.1 " 02/13/2024     Avoid insulin stacking  Titrate insulin slowly       Endocrine  New onset type 2 diabetes mellitus  BG goal 140-180  No previous hx of T2DM per family at bedside. A1c of 10.4. DKA at OSH. TF on..BG stable on regimen.    Plan:  - Continue Levemir 12 units daily.  - Continue Novolog 6 units q 4 hrs while on tube feeding (HOLD if tube feeding is stopped or BG < 100)   - Continue Moderate Dose Correction Scale  - BG monitoring q 4 hrs while NPO /TF    ** Please call Endocrine for any BG related issues **      Discharge plans: TBD    Lab Results   Component Value Date    HGBA1C 10.4 (H) 01/30/2024             Salvador Alfaro PA-C  Endocrinology  [facility]

## 2024-02-13 NOTE — CARE UPDATE
Patient's chart was reviewed by a stroke provider. Case was discussed with staff.     52 yo female with PMHx obesity currently admitted to SICU for necrotizing fascitis s/p surgical debridement with encephalopathy post procedure. MRI Brain was obtained which showed multifocal areas of restricted diffusion in bilateral hemispheres in multiple vascular territories with additional L temporal focus with vasogenic edema. MRV negative. Repeat MRI showed left temporal lesion to be improving and be less prominent. EEG negative for seizure activity. TTE without evidence of vegetations and JAY had been recommended however cards did not feel it would change prognosis. Patient remains intubated with poor prognosis and palliative care is following for ongoing GOC discussion.   Per pall note, family wants to continue full supportive care.  Patient will go for LP under fluoro per gen neuro on 2/14.    No additional recs from stroke at this time.

## 2024-02-13 NOTE — SUBJECTIVE & OBJECTIVE
"Interval HPI:   S/p wound debridement and wound vac exchange . Patient in room 26405/21210 A. Blood glucose stable. BG at goal on current insulin regimen (Basal, SSI, prandial). Steroid use- None .   Renal function- Abnormal -   Vasopressors-  None      Diet NPO      Eating:   NPO  Nausea: No  Hypoglycemia and intervention: No  Fever: No  TPN and/or TF: Yes 30 cc/hr (on hold for procedure)    BP (!) 163/68   Pulse 101   Temp 98.6 °F (37 °C) (Oral)   Resp (!) 31   Ht 5' 5" (1.651 m)   Wt (!) 153.3 kg (338 lb)   LMP 01/01/2024 (Approximate) Comment: tubal ligation  SpO2 100%   BMI 56.25 kg/m²     Labs Reviewed and Include    Recent Labs   Lab 02/13/24  0301   *   CALCIUM 8.0*   ALBUMIN 1.8*   PROT 7.1   *   K 4.4   CO2 24      BUN 15   CREATININE 1.1   ALKPHOS 130   ALT 14   AST 25   BILITOT 0.3     Lab Results   Component Value Date    WBC 13.13 (H) 02/13/2024    HGB 7.7 (L) 02/13/2024    HCT 24.7 (L) 02/13/2024    MCV 86 02/13/2024     02/13/2024     No results for input(s): "TSH", "FREET4" in the last 168 hours.  Lab Results   Component Value Date    HGBA1C 10.4 (H) 01/30/2024       Nutritional status:   Body mass index is 56.25 kg/m².  Lab Results   Component Value Date    ALBUMIN 1.8 (L) 02/13/2024    ALBUMIN 1.8 (L) 02/12/2024    ALBUMIN 1.9 (L) 02/12/2024    ALBUMIN 1.9 (L) 02/12/2024     No results found for: "PREALBUMIN"    Estimated Creatinine Clearance: 89.2 mL/min (based on SCr of 1.1 mg/dL).    Accu-Checks  Recent Labs     02/11/24  2336 02/12/24  0308 02/12/24  0711 02/12/24  1159 02/12/24  1527 02/12/24  1715 02/12/24  1922 02/13/24  0039 02/13/24  0301 02/13/24  0748   POCTGLUCOSE 165* 130* 139* 162* 142* 187* 170* 158* 158* 198*       Current Medications and/or Treatments Impacting Glycemic Control  Immunotherapy:    Immunosuppressants       None          Steroids:   Hormones (From admission, onward)      None          Pressors:    Autonomic Drugs (From admission, " onward)      None          Hyperglycemia/Diabetes Medications:   Antihyperglycemics (From admission, onward)      Start     Stop Route Frequency Ordered    02/13/24 0915  insulin detemir U-100 (Levemir) pen 14 Units         -- SubQ Daily 02/13/24 0906    02/09/24 1200  insulin aspart U-100 pen 6 Units         -- SubQ Every 4 hours 02/09/24 0901    02/03/24 1010  insulin aspart U-100 pen 0-10 Units         -- SubQ Every 4 hours PRN 02/03/24 0911

## 2024-02-13 NOTE — PLAN OF CARE
"Please link this note to the resident's progress note. This note serves as the attestation.    In brief, Sarah Saravia is a 53 year-old woman with a history of hypertension, morbid obesity, renal insufficiency, and type 2 diabetes who was admitted as a transfer for necrotizing soft tissue infection.  She has undergone debridements for treatment.  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       Critical Care issues in this patient include:    Ayaz's gangrene              * Underwent debridement/wound vac exchange on 2/9. Plan for vac change on tomorrow. Continue rocephin and flagyl. ID consulted and are following. Appreciate their recommendations. Wound cultures growing pansensitive proteus mirabilis and Bacteroides. Blood cultures have been NGTD.      Encephalopathy, metabolic              * Initially thought to be due to sepsis and severe infection. Neurology consulted and are following. EEG done and shows severe slowing and no seizures. MRI brain done recently revealed "several scattered punctate acute infarcts throughout the bilateral frontal lobes, centrum semiovale, basal ganglia, corpus callosum, and cerebellar hemispheres" concerning for embolic source. Obtained echo to evaluate for cardiac vegetations or PFO; none noted. Consulted Vascular Neurology per Neurology (general). Appreciate their recs. CTA head and neck done and no significant occlusion noted. JAY not performed by Cardiology because they do not believe it'll change her treatment, which I disagree with. JAY done by cardiac anesthesia yesterday did not show any vegetations. MRI with contrast--discussed with rads and vasc neuro. Don't believe she needs it. Recent repeat MRI, MRA and MRV without new changes. Hoping she gets an LP soon.     Acute renal failure with tubular necrosis              * ARF on CKD. Meza replaced and she is starting to make more urine. Keep meza in place " for now. Nephrology has been consulted and are following. Appreciate recs. Failed recent lasix challenge. Last had HD on 2/9. Receiving RRT (due to staffing). Trialysis replaced 2/11 on the left side and functioning ok. Monitor electrolytes. Phos remains very high. On novasource for TFs and Nephro believes that the source. Unclear. Continue phos binders.     Type 2 diabetes mellitus with hyperglycemia              * Endocrinology consulted and are following. Appreciate their recs. Poorly controlled BG at baseline with recent Hgb A1c at 10.4%. Continue with hourly glucose checks and titrate insulin infusion per protocol. Discuss with endocrine to transition off of infusion.     Encounter for weaning the ventilator              * Remains on pressure support on the vent. Cannot extubate secondary to poor mental status. CXR and ABG pending this morning. Continue with lung protective ventilation. Keep HOB elevated 30 degrees.     Patient remains stable in the ICU. Still not waking up despite not being on sedation for over a week. Continue tube feeds. Continue fiber for diarrhea. To . Continue GI and DVT prophylaxis. Continue ICU care.     Patient remains stable. Hold tube feeds while awaiting OR. Restart when she comes back. Transition off of insulin gtt. Continue GI and DVT prophylaxis. Continue ICU care.     Critical care time spent: 38 min    Critical care was time spent personally by me on the following activities: development of treatment plan with patient or surrogate and bedside caregivers, discussions with consultants, evaluation of patient's response to treatment, examination of patient, ordering and performing treatments and interventions, ordering and review of laboratory studies, ordering and review of radiographic studies, pulse oximetry, re-evaluation of patient's condition.  This critical care time did not overlap with that of any other provider or involve time for any procedures.      Karla Connelly,  MD, FACS  General Surgery and Surgical Critical Care  [facility]

## 2024-02-13 NOTE — PROGRESS NOTES
[facility]  General Surgery  Progress Note    Subjective:     History of Present Illness:  53 year old morbidly obese female who does not routinely go to the doctor presents to the ED with malaise, nausea, vomiting, and groin, buttock, perineal swelling and tenderness. She began feeling ill a few days ago.     On arrival to the ED she is tachycardic to 120, hypertensive without fever. Labs demonstrate leukocytosis of 21, , with lactic acidosis and associated ALVARADO with cr 3.8, hyperglycemia with blood glucose of 824 accompanied by severe electrolyte derangements. CT imaging obtained demonstrates diffuse subcutaneous air along buttocks, perineum, anterior vulva, as well as bilateral thighs.     No prior abdominal surgeries. She denies problems with her heart or lungs. Non smoker. Does not routinely take any medications.    Post-Op Info:  Procedure(s) (LRB):  REPLACEMENT, WOUND VAC (Right)  R thigh wound debridement (Right)   1 Day Post-Op     Interval History: NAEON. Afebrile. HDS. Remains intubated on minimal vent settings. SLED overnight. Vac change yesterday.    Medications:  Continuous Infusions:   calcium gluconate 3 g in dextrose 5 % (D5W) 100 mL infusion Stopped (02/13/24 0428)    dextrose 10 % in water (D10W)      dextrose-sod citrate-citric ac Stopped (02/13/24 0428)     Scheduled Meds:   sodium chloride 0.9%   Intravenous Once    amLODIPine  10 mg Per OG tube Daily    carvediloL  25 mg Per OG tube BID    cefTRIAXone (Rocephin) IV (PEDS and ADULTS)  2 g Intravenous Q24H    famotidine  20 mg Per OG tube Daily    heparin (porcine)  7,500 Units Subcutaneous Q8H    insulin aspart U-100  6 Units Subcutaneous Q4H    insulin detemir U-100  12 Units Subcutaneous Daily    metronidazole  500 mg Intravenous Q8H    psyllium husk (aspartame)  1 packet Per OG tube BID    sevelamer carbonate  1.6 g Per OG tube TID WM     PRN Meds:0.9%  NaCl infusion (for blood administration), sodium chloride  0.9%, albuterol-ipratropium, dextrose 10 % in water (D10W), dextrose 10%, dextrose 10%, glucagon (human recombinant), glucose, glucose, hydrALAZINE, insulin aspart U-100, labetalol, magnesium sulfate IVPB, naloxone, ondansetron, oxyCODONE, oxyCODONE, prochlorperazine, sodium chloride 0.9%, sodium chloride 0.9%, sodium chloride 0.9%, sodium phosphate 20.01 mmol in dextrose 5 % (D5W) 250 mL IVPB, sodium phosphate 30 mmol in dextrose 5 % (D5W) 250 mL IVPB, sodium phosphate 39.99 mmol in dextrose 5 % (D5W) 250 mL IVPB     Review of patient's allergies indicates:  No Known Allergies  Objective:     Vital Signs (Most Recent):  Temp: 98.6 °F (37 °C) (02/13/24 0701)  Pulse: 101 (02/13/24 0715)  Resp: (!) 31 (02/13/24 0715)  BP: (!) 163/68 (02/13/24 0715)  SpO2: 100 % (02/13/24 0715) Vital Signs (24h Range):  Temp:  [98.6 °F (37 °C)-99.5 °F (37.5 °C)] 98.6 °F (37 °C)  Pulse:  [] 101  Resp:  [15-31] 31  SpO2:  [98 %-100 %] 100 %  BP: (120-179)/(57-80) 163/68     Weight: (!) 153.3 kg (338 lb)  Body mass index is 56.25 kg/m².    Intake/Output - Last 3 Shifts         02/11 0700  02/12 0659 02/12 0700 02/13 0659 02/13 0700 02/14 0659    I.V. (mL/kg) 2061.4 (13.4) 331.8 (2.2)     Blood       NG/ 510 30    IV Piggyback 256.8 1454.3 101.6    Total Intake(mL/kg) 2888.2 (18.8) 2296.1 (15) 131.6 (0.9)    Urine (mL/kg/hr) 745 (0.2) 660 (0.2) 5 (0)    Other 1506 3467 0    Stool 0 600     Total Output 2251 4727 5    Net +637.2 -2430.9 +126.6           Stool Occurrence 1 x 2 x             Physical Exam  Vitals and nursing note reviewed.   Constitutional:       General: She is not in acute distress.     Appearance: She is ill-appearing.   HENT:      Head: Normocephalic and atraumatic.   Eyes:      Extraocular Movements: Extraocular movements intact.      Conjunctiva/sclera: Conjunctivae normal.   Neck:      Comments: Left IJ Trialysis catheter  Cardiovascular:      Rate and Rhythm: Normal rate and regular rhythm.   Pulmonary:       Effort: Pulmonary effort is normal.      Comments: Vent Mode: Spont  Oxygen Concentration (%):  [40] 40  Resp Rate Total:  [19 br/min-30 br/min] 21 br/min  PEEP/CPAP:  [5 cmH20] 5 cmH20  Pressure Support:  [10 cmH20] 10 cmH20  Mean Airway Pressure:  [8.6 cmH20-9.2 cmH20] 8.6 cmH20     Abdominal:      General: There is no distension.      Palpations: Abdomen is soft.      Tenderness: There is no abdominal tenderness.   Genitourinary:     Comments: Glynn in place  Rectal Tube  Musculoskeletal:      Cervical back: Normal range of motion.   Skin:     General: Skin is warm and dry.      Comments: Wound vac in place to R thigh/groin to suction   Neurological:      General: No focal deficit present.      Mental Status: She is oriented to person, place, and time.   Psychiatric:         Mood and Affect: Mood normal.         Behavior: Behavior normal.          Significant Labs:  I have reviewed all pertinent lab results within the past 24 hours.  CBC:   Recent Labs   Lab 02/13/24  0301   WBC 13.13*   RBC 2.86*   HGB 7.7*   HCT 24.7*      MCV 86   MCH 26.9*   MCHC 31.2*       BMP:   Recent Labs   Lab 02/13/24  0301   *   *   K 4.4      CO2 24   BUN 15   CREATININE 1.1   CALCIUM 8.0*   MG 2.0         Significant Diagnostics:  I have reviewed all pertinent imaging results/findings within the past 24 hours.  Assessment/Plan:     * Ayaz's gangrene  53 y.o. 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     - Last [place] change 2/12  - Next wound vac change either Thursday or Friday, will obtain consent  - starting to discuss need for trach/PEG/permacath/diverting colostomy given that the family would like everything done; may plan for this late this week pending patient's clinical status and ongoing discussions with family regarding poor prognosis  - Palliative following given overall poor prognosis, daughter  would like everything done; MRI slightly improved but no changes to patient's neuro status  - Daily SBTs  - Okay for DVT ppx   - Remainder of care per SICU        Jean Claude Lara MD  General Surgery  [facility]

## 2024-02-13 NOTE — PROGRESS NOTES
02/12/24 2344   Treatment   Treatment Type SLED   Treatment Status Restart   Dialysis Machine Number [id number]   Dialyzer Time (hours) 0   BVP (Liters) 0 L   Solutions Labeled and Current  Yes   Access Temporary Cath   Catheter Dressing Intact  Yes   Alarms Engaged Yes   CRRT Comments CRRT RST   $ CRRT Charges   $ CRRT Charges Restart     Report received from primary RN. CRRT restarted per MD order.

## 2024-02-13 NOTE — PROGRESS NOTES
[facility] - Surgical Intensive Care  Nephrology  Progress Note    Patient Name: Sarah Saravia  MRN: [id number]  Admission Date: 1/29/2024  Hospital Length of Stay: 15 days  Attending Provider: Steve Cole MD   Primary Care Physician: [facility]  Principal Problem:Ayaz's gangrene    Subjective:     HPI: Sarah Saravia is a 53 year old female with a past medical history of obesity (BMI 53) admitted with N/V and R groin wound found to be in DKA at OSH.  She underwent a CT abdomen and pelvis that showed extensive soft tissue air throughout the right groin and inguinal region and extending to involve the right lower quadrant anterior abdominal wall with extensive soft tissue air also seen involving the proximal medial aspect of the right thigh, concerning for gas-forming infection. She is s/p OR debridement for necrotizing fascitis on 1/29/24 and take back for 1/31/24. Right groin tissue growing proteus, susceptibilities still pending. Currently on meropenem and clindamycin which was d/c'd on 1/31/24. While at OSH pt developed acute respiratory failure requiring intubation. Nephrology was consulted for worsening alvarado in the setting of lactic acidosis and septic shock likely ATN, sCr elevated at 3.8 on admit.  OR today for debridement with possible closure and wound vac placement. Last HD 2/1. Net -1.3L. Electrolytes stable. Nephrology consulted for ALVARADO.     Interval History: Patient seen and examined this AM. Underwent wound debridement and wound vac exchange yesterday. Concern for ST elevations new RBB yesterday. Received SLED overnight x8 hours overnight with citrate. Clotted x2.    Review of patient's allergies indicates:  No Known Allergies  Current Facility-Administered Medications   Medication Frequency    0.9%  NaCl infusion (for blood administration) Q24H PRN    0.9%  NaCl infusion PRN    0.9%  NaCl infusion Once    albuterol-ipratropium 2.5 mg-0.5 mg/3 mL nebulizer solution 3  mL Q4H PRN    amLODIPine tablet 10 mg Daily    calcium gluconate 3 g in dextrose 5 % (D5W) 100 mL infusion Continuous    carvediloL tablet 25 mg BID    cefTRIAXone (ROCEPHIN) 2 g in dextrose 5 % in water (D5W) 100 mL IVPB (MB+) Q24H    dextrose 10 % infusion Continuous PRN    dextrose 10% bolus 125 mL 125 mL PRN    dextrose 10% bolus 250 mL 250 mL PRN    dextrose-sod citrate-citric ac 2.45-2.2 gram- 800 mg/100 mL Soln Continuous    famotidine tablet 20 mg Daily    glucagon (human recombinant) injection 1 mg PRN    glucose chewable tablet 16 g PRN    glucose chewable tablet 24 g PRN    heparin (porcine) injection 7,500 Units Q8H    hydrALAZINE injection 10 mg Q4H PRN    insulin aspart U-100 pen 0-10 Units Q4H PRN    insulin aspart U-100 pen 6 Units Q4H    insulin detemir U-100 (Levemir) pen 14 Units Daily    labetalol 20 mg/4 mL (5 mg/mL) IV syring Q6H PRN    magnesium sulfate 2g in water 50mL IVPB (premix) PRN    metronidazole IVPB 500 mg Q8H    naloxone 0.4 mg/mL injection 0.02 mg PRN    ondansetron injection 4 mg Q6H PRN    oxyCODONE 5 mg/5 mL solution 10 mg Q6H PRN    oxyCODONE 5 mg/5 mL solution 5 mg Q6H PRN    prochlorperazine injection Soln 5 mg Q6H PRN    psyllium husk (aspartame) 3.4 gram packet 1 packet BID    sevelamer carbonate pwpk 1.6 g TID WM    sodium chloride 0.9% bolus 250 mL 250 mL PRN    sodium chloride 0.9% bolus 250 mL 250 mL PRN    sodium chloride 0.9% flush 10 mL PRN    sodium phosphate 20.01 mmol in dextrose 5 % (D5W) 250 mL IVPB PRN    sodium phosphate 30 mmol in dextrose 5 % (D5W) 250 mL IVPB PRN    sodium phosphate 39.99 mmol in dextrose 5 % (D5W) 250 mL IVPB PRN       Objective:     Vital Signs (Most Recent):  Temp: 98.6 °F (37 °C) (02/13/24 0701)  Pulse: 103 (02/13/24 0915)  Resp: (!) 31 (02/13/24 0915)  BP: (!) 171/74 (02/13/24 0915)  SpO2: 100 % (02/13/24 0915) Vital Signs (24h Range):  Temp:  [98.6 °F (37 °C)-99.5 °F (37.5 °C)] 98.6 °F (37 °C)  Pulse:  [] 103  Resp:  [15-31]  "31  SpO2:  [98 %-100 %] 100 %  BP: (120-179)/(57-80) 171/74     Weight: (!) 153.3 kg (338 lb) (02/08/24 0300)  Body mass index is 56.25 kg/m².  Body surface area is 2.65 meters squared.    I/O last 3 completed shifts:  In: 3639.5 [I.V.:1386.6; NG/GT:700; IV Piggyback:1552.9]  Out: 6096 [Urine:980; Other:4516; Stool:600]    Physical Exam  Vitals and nursing note reviewed.   Constitutional:       General: She is not in acute distress.     Appearance: She is obese. She is not ill-appearing or toxic-appearing.      Interventions: She is sedated and intubated.   Eyes:      General: No scleral icterus.        Right eye: No discharge.         Left eye: No discharge.   Cardiovascular:      Rate and Rhythm: Normal rate.   Pulmonary:      Effort: No respiratory distress. She is intubated.      Comments:       Abdominal:      General: There is distension.   Musculoskeletal:      Right lower leg: Edema present.      Left lower leg: Edema present.          Significant Labs:  ABGs:   No results for input(s): "PH", "PCO2", "HCO3", "POCSATURATED", "BE" in the last 168 hours.    BMP:   Recent Labs   Lab 02/13/24 0301   *   *   K 4.4      CO2 24   BUN 15   CREATININE 1.1   CALCIUM 8.0*   MG 2.0       CBC:   Recent Labs   Lab 02/13/24 0301   WBC 13.13*   RBC 2.86*   HGB 7.7*   HCT 24.7*      MCV 86   MCH 26.9*   MCHC 31.2*       CMP:   Recent Labs   Lab 02/13/24 0301   *   CALCIUM 8.0*   ALBUMIN 1.8*   PROT 7.1   *   K 4.4   CO2 24      BUN 15   CREATININE 1.1   ALKPHOS 130   ALT 14   AST 25   BILITOT 0.3       LFTs:   Recent Labs   Lab 02/13/24 0301   ALT 14   AST 25   ALKPHOS 130   BILITOT 0.3   PROT 7.1   ALBUMIN 1.8*       Microbiology Results (last 7 days)       Procedure Component Value Units Date/Time    Fungus culture [[id number]] Collected: 01/30/24 0228    Order Status: Completed Specimen: Wound from Groin Updated: 02/12/24 1409     Fungus (Mycology) Culture No fungal growth " to date    Narrative:      Right groin tissue    Fungus culture [[id number]] Collected: 01/30/24 0219    Order Status: Completed Specimen: Abscess from Groin Updated: 02/12/24 1409     Fungus (Mycology) Culture No fungal growth to date    Narrative:      Right groin abcess    Culture, Anaerobe [[id number]]  (Abnormal) Collected: 01/30/24 0228    Order Status: Completed Specimen: Wound from Groin Updated: 02/07/24 0748     Anaerobic Culture No anaerobes isolated      BACTEROIDES SPECIES  Few      Narrative:      Right groin tissue    Culture, Anaerobe [[id number]] Collected: 01/30/24 0219    Order Status: Completed Specimen: Abscess from Groin Updated: 02/07/24 0748     Anaerobic Culture No anaerobes isolated    Narrative:      Right groin abcess          Specimen (24h ago, onward)      None          Significant Imaging:  I have reviewed all imagining in the last 24 hours.  Assessment/Plan:     Renal/  * Ayaz's gangrene  - management per primary     ALVARADO (acute kidney injury)  Transfer from [facility]. Admitted for fourniers gangrene. Initiated HD on 1/31. ALVARADO 2/2 ATN in setting of septic shock. Arrived with CR 3.8. Unknown baseline.  Plan to OR today. Net -1.3L.OR today for debridement with possible closure and wound vac placement     ALVARADO most likley ATN in setting of septic shock     Plan/Recommendation  -plan for break from RRT today as urine output has picked up and laboratory studies without any acute indications  -will reassess tomorrow  -Daily RFP   -Strict I&Os  -Avoid nephrotoxins, if possible     Thank you for your consult. I will follow-up with patient. Please contact us if you have any additional questions.    Wilian Cat MD  Nephrology  [facility] - [facility]

## 2024-02-13 NOTE — SUBJECTIVE & OBJECTIVE
Interval History: Patient seen and examined this AM. Underwent wound debridement and wound vac exchange yesterday. Concern for ST elevations new RBB yesterday. Received SLED overnight x8 hours overnight with citrate. Clotted x2.    Review of patient's allergies indicates:  No Known Allergies  Current Facility-Administered Medications   Medication Frequency    0.9%  NaCl infusion (for blood administration) Q24H PRN    0.9%  NaCl infusion PRN    0.9%  NaCl infusion Once    albuterol-ipratropium 2.5 mg-0.5 mg/3 mL nebulizer solution 3 mL Q4H PRN    amLODIPine tablet 10 mg Daily    calcium gluconate 3 g in dextrose 5 % (D5W) 100 mL infusion Continuous    carvediloL tablet 25 mg BID    cefTRIAXone (ROCEPHIN) 2 g in dextrose 5 % in water (D5W) 100 mL IVPB (MB+) Q24H    dextrose 10 % infusion Continuous PRN    dextrose 10% bolus 125 mL 125 mL PRN    dextrose 10% bolus 250 mL 250 mL PRN    dextrose-sod citrate-citric ac 2.45-2.2 gram- 800 mg/100 mL Soln Continuous    famotidine tablet 20 mg Daily    glucagon (human recombinant) injection 1 mg PRN    glucose chewable tablet 16 g PRN    glucose chewable tablet 24 g PRN    heparin (porcine) injection 7,500 Units Q8H    hydrALAZINE injection 10 mg Q4H PRN    insulin aspart U-100 pen 0-10 Units Q4H PRN    insulin aspart U-100 pen 6 Units Q4H    insulin detemir U-100 (Levemir) pen 14 Units Daily    labetalol 20 mg/4 mL (5 mg/mL) IV syring Q6H PRN    magnesium sulfate 2g in water 50mL IVPB (premix) PRN    metronidazole IVPB 500 mg Q8H    naloxone 0.4 mg/mL injection 0.02 mg PRN    ondansetron injection 4 mg Q6H PRN    oxyCODONE 5 mg/5 mL solution 10 mg Q6H PRN    oxyCODONE 5 mg/5 mL solution 5 mg Q6H PRN    prochlorperazine injection Soln 5 mg Q6H PRN    psyllium husk (aspartame) 3.4 gram packet 1 packet BID    sevelamer carbonate pwpk 1.6 g TID WM    sodium chloride 0.9% bolus 250 mL 250 mL PRN    sodium chloride 0.9% bolus 250 mL 250 mL PRN    sodium chloride 0.9% flush 10 mL  "PRN    sodium phosphate 20.01 mmol in dextrose 5 % (D5W) 250 mL IVPB PRN    sodium phosphate 30 mmol in dextrose 5 % (D5W) 250 mL IVPB PRN    sodium phosphate 39.99 mmol in dextrose 5 % (D5W) 250 mL IVPB PRN       Objective:     Vital Signs (Most Recent):  Temp: 98.6 °F (37 °C) (02/13/24 0701)  Pulse: 103 (02/13/24 0915)  Resp: (!) 31 (02/13/24 0915)  BP: (!) 171/74 (02/13/24 0915)  SpO2: 100 % (02/13/24 0915) Vital Signs (24h Range):  Temp:  [98.6 °F (37 °C)-99.5 °F (37.5 °C)] 98.6 °F (37 °C)  Pulse:  [] 103  Resp:  [15-31] 31  SpO2:  [98 %-100 %] 100 %  BP: (120-179)/(57-80) 171/74     Weight: (!) 153.3 kg (338 lb) (02/08/24 0300)  Body mass index is 56.25 kg/m².  Body surface area is 2.65 meters squared.    I/O last 3 completed shifts:  In: 3639.5 [I.V.:1386.6; NG/GT:700; IV Piggyback:1552.9]  Out: 6096 [Urine:980; Other:4516; Stool:600]     Physical Exam  Vitals and nursing note reviewed.   Constitutional:       General: She is not in acute distress.     Appearance: She is obese. She is not ill-appearing or toxic-appearing.      Interventions: She is sedated and intubated.   Eyes:      General: No scleral icterus.        Right eye: No discharge.         Left eye: No discharge.   Cardiovascular:      Rate and Rhythm: Normal rate.   Pulmonary:      Effort: No respiratory distress. She is intubated.      Comments:       Abdominal:      General: There is distension.   Musculoskeletal:      Right lower leg: Edema present.      Left lower leg: Edema present.          Significant Labs:  ABGs:   No results for input(s): "PH", "PCO2", "HCO3", "POCSATURATED", "BE" in the last 168 hours.    BMP:   Recent Labs   Lab 02/13/24 0301   *   *   K 4.4      CO2 24   BUN 15   CREATININE 1.1   CALCIUM 8.0*   MG 2.0       CBC:   Recent Labs   Lab 02/13/24 0301   WBC 13.13*   RBC 2.86*   HGB 7.7*   HCT 24.7*      MCV 86   MCH 26.9*   MCHC 31.2*       CMP:   Recent Labs   Lab 02/13/24 0301   * "   CALCIUM 8.0*   ALBUMIN 1.8*   PROT 7.1   *   K 4.4   CO2 24      BUN 15   CREATININE 1.1   ALKPHOS 130   ALT 14   AST 25   BILITOT 0.3       LFTs:   Recent Labs   Lab 02/13/24  0301   ALT 14   AST 25   ALKPHOS 130   BILITOT 0.3   PROT 7.1   ALBUMIN 1.8*       Microbiology Results (last 7 days)       Procedure Component Value Units Date/Time    Fungus culture [[id number]] Collected: 01/30/24 0228    Order Status: Completed Specimen: Wound from Groin Updated: 02/12/24 1409     Fungus (Mycology) Culture No fungal growth to date    Narrative:      Right groin tissue    Fungus culture [[id number]] Collected: 01/30/24 0219    Order Status: Completed Specimen: Abscess from Groin Updated: 02/12/24 1409     Fungus (Mycology) Culture No fungal growth to date    Narrative:      Right groin abcess    Culture, Anaerobe [[id number]]  (Abnormal) Collected: 01/30/24 0228    Order Status: Completed Specimen: Wound from Groin Updated: 02/07/24 0748     Anaerobic Culture No anaerobes isolated      BACTEROIDES SPECIES  Few      Narrative:      Right groin tissue    Culture, Anaerobe [[id number]] Collected: 01/30/24 0219    Order Status: Completed Specimen: Abscess from Groin Updated: 02/07/24 0748     Anaerobic Culture No anaerobes isolated    Narrative:      Right groin abcess          Specimen (24h ago, onward)      None          Significant Imaging:  I have reviewed all imagining in the last 24 hours.

## 2024-02-13 NOTE — ASSESSMENT & PLAN NOTE
53 y.o. 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     - Last WV change 2/12  - Next wound vac change either Thursday or Friday, will obtain consent  - starting to discuss need for trach/PEG/permacath/diverting colostomy given that the family would like everything done; may plan for this late this week pending patient's clinical status and ongoing discussions with family regarding poor prognosis  - Palliative following given overall poor prognosis, daughter would like everything done; MRI slightly improved but no changes to patient's neuro status  - Daily SBTs  - Okay for DVT ppx   - Remainder of care per SICU

## 2024-02-13 NOTE — SUBJECTIVE & OBJECTIVE
Interval History/Significant Events: s/p wound debridement and wound vac exchange. Tfs restarted. Noted to have ST elevations and subsequent EKG with new RBB. Received SLED overnight. Otherwise HDS.     Follow-up For: Procedure(s) (LRB):  REPLACEMENT, WOUND VAC (Right)  R thigh wound debridement (Right)    Post-Operative Day: 1 Day Post-Op    Objective:     Vital Signs (Most Recent):  Temp: 98.6 °F (37 °C) (02/13/24 0701)  Pulse: 101 (02/13/24 0715)  Resp: (!) 31 (02/13/24 0715)  BP: (!) 163/68 (02/13/24 0715)  SpO2: 100 % (02/13/24 0715) Vital Signs (24h Range):  Temp:  [98.6 °F (37 °C)-99.5 °F (37.5 °C)] 98.6 °F (37 °C)  Pulse:  [] 101  Resp:  [15-31] 31  SpO2:  [98 %-100 %] 100 %  BP: (120-179)/(57-80) 163/68     Weight: (!) 153.3 kg (338 lb)  Body mass index is 56.25 kg/m².      Intake/Output Summary (Last 24 hours) at 2/13/2024 0820  Last data filed at 2/13/2024 0701  Gross per 24 hour   Intake 2397.74 ml   Output 4587 ml   Net -2189.26 ml          Constitutional:       Appearance: She is obese.   HENT:      Head: Normocephalic.      Mouth/Throat:      Mouth: Mucous membranes are moist.   Eyes:      Comments: PERRL, no gaze  Neck:      Comments: LIJ Trialysis  Cardiovascular:      Rate and Rhythm: Normal rate and regular rhythm.   Pulmonary:      Effort: Pulmonary effort is normal.      Comments: Intubated  Abdominal:      General: Abdomen is flat.      Palpations: Abdomen is soft.   Genitourinary:     Comments: R groin wound   Glynn in place  Rectal tube  Musculoskeletal:       BLE nonedematous   Skin:     General: Skin is warm.   Neurological:      Comments: Opens eyes but does not withdraw, track, or respond to pain.       Vents:  Vent Mode: Spont (02/13/24 0350)  Set Rate: 18 BPM (02/06/24 0349)  Vt Set: 420 mL (02/06/24 0349)  Pressure Support: 10 cmH20 (02/13/24 0350)  PEEP/CPAP: 5 cmH20 (02/13/24 0350)  Oxygen Concentration (%): 40 (02/13/24 0350)  Peak Airway Pressure: 16 cmH20 (02/13/24  0350)  Plateau Pressure: 15 cmH20 (02/13/24 0350)  Total Ve: 10.1 L/m (02/13/24 0350)  Negative Inspiratory Force (cm H2O): 0 (02/12/24 1222)  F/VT Ratio<105 (RSBI): (!) 65.57 (02/13/24 0350)    Lines/Drains/Airways       Central Venous Catheter Line  Duration             Trialysis (Dialysis) Catheter 02/11/24 2303 left internal jugular 1 day              Drain  Duration                  Rectal Tube 02/04/24 1545 8 days         NG/OG Tube 02/06/24 1030 Center mouth 6 days         Urethral Catheter 02/06/24 1900 6 days              Airway  Duration                  Airway - Non-Surgical 01/31/24 1020 12 days              Peripheral Intravenous Line  Duration                  Peripheral IV - Single Lumen 02/09/24 0130 18 G Anterior;Right Forearm 4 days         Peripheral IV - Single Lumen 02/13/24 0548 18 G Left Antecubital <1 day                    Significant Labs:    CBC/Anemia Profile:  Recent Labs   Lab 02/11/24  1458 02/12/24  0252 02/13/24  0301   WBC 12.56 12.59 13.13*   HGB 7.4* 7.8* 7.7*   HCT 22.9* 24.2* 24.7*    380 365   MCV 86 84 86   RDW 17.3* 17.2* 17.7*        Chemistries:  Recent Labs   Lab 02/12/24  0252 02/12/24  1714 02/12/24  2143 02/13/24  0301   * 134*  134* 134* 135*   K 4.3 4.8  4.8 4.8 4.4    103  103 105 104   CO2 23 23  23 22* 24   BUN 28* 36*  36* 28* 15   CREATININE 1.9* 2.6*  2.6* 1.9* 1.1   CALCIUM 8.2* 8.5*  8.5* 8.0* 8.0*   ALBUMIN 1.9* 1.9*  1.9* 1.8* 1.8*   PROT 7.3  --   --  7.1   BILITOT 0.3  --   --  0.3   ALKPHOS 132  --   --  130   ALT 14  --   --  14   AST 31  --   --  25   MG 2.0 2.0  2.0 1.8 2.0   PHOS 3.7 5.9*  5.9* 3.8 2.1*           Significant Imaging:  N/A

## 2024-02-13 NOTE — PLAN OF CARE
SICU PLAN OF CARE NOTE    Dx: Ayaz's gangrene    Goals of Care: SBP <170    Vital Signs (last 12 hours):   Temp:  [98.6 °F (37 °C)-99.5 °F (37.5 °C)]   Pulse:  [86-98]   Resp:  [19-27]   BP: (120-178)/(61-80)   SpO2:  [99 %-100 %]      Neuro: Unresponsive    Cardiac: NSR    Respiratory: Ventilator    Urine Output: Urinary Catheter 440 cc/shift    Dialysis: SLED, UF Rate: 400/hr    Drains: Wound Vac, total output 300 / shift    Diet: NPO and Tube Feeds     Labs/Accuchecks: Accuchecks Q4.    Skin:  wounds and incisions per documentation.    Shift Events:  NAEON. VSS. Neuro status unchanged, withdrawing to pain in BLE. UOP adequate. SLED x8hrs, Clotted x2. Wound vac output minimal and serosanguineous.

## 2024-02-13 NOTE — SUBJECTIVE & OBJECTIVE
Interval History: NAEON. Afebrile. HDS. Remains intubated on minimal vent settings. SLED overnight. Vac change yesterday.    Medications:  Continuous Infusions:   calcium gluconate 3 g in dextrose 5 % (D5W) 100 mL infusion Stopped (02/13/24 0428)    dextrose 10 % in water (D10W)      dextrose-sod citrate-citric ac Stopped (02/13/24 0428)     Scheduled Meds:   sodium chloride 0.9%   Intravenous Once    amLODIPine  10 mg Per OG tube Daily    carvediloL  25 mg Per OG tube BID    cefTRIAXone (Rocephin) IV (PEDS and ADULTS)  2 g Intravenous Q24H    famotidine  20 mg Per OG tube Daily    heparin (porcine)  7,500 Units Subcutaneous Q8H    insulin aspart U-100  6 Units Subcutaneous Q4H    insulin detemir U-100  12 Units Subcutaneous Daily    metronidazole  500 mg Intravenous Q8H    psyllium husk (aspartame)  1 packet Per OG tube BID    sevelamer carbonate  1.6 g Per OG tube TID WM     PRN Meds:0.9%  NaCl infusion (for blood administration), sodium chloride 0.9%, albuterol-ipratropium, dextrose 10 % in water (D10W), dextrose 10%, dextrose 10%, glucagon (human recombinant), glucose, glucose, hydrALAZINE, insulin aspart U-100, labetalol, magnesium sulfate IVPB, naloxone, ondansetron, oxyCODONE, oxyCODONE, prochlorperazine, sodium chloride 0.9%, sodium chloride 0.9%, sodium chloride 0.9%, sodium phosphate 20.01 mmol in dextrose 5 % (D5W) 250 mL IVPB, sodium phosphate 30 mmol in dextrose 5 % (D5W) 250 mL IVPB, sodium phosphate 39.99 mmol in dextrose 5 % (D5W) 250 mL IVPB     Review of patient's allergies indicates:  No Known Allergies  Objective:     Vital Signs (Most Recent):  Temp: 98.6 °F (37 °C) (02/13/24 0701)  Pulse: 101 (02/13/24 0715)  Resp: (!) 31 (02/13/24 0715)  BP: (!) 163/68 (02/13/24 0715)  SpO2: 100 % (02/13/24 0715) Vital Signs (24h Range):  Temp:  [98.6 °F (37 °C)-99.5 °F (37.5 °C)] 98.6 °F (37 °C)  Pulse:  [] 101  Resp:  [15-31] 31  SpO2:  [98 %-100 %] 100 %  BP: (120-179)/(57-80) 163/68     Weight: (!)  153.3 kg (338 lb)  Body mass index is 56.25 kg/m².    Intake/Output - Last 3 Shifts         02/11 0700  02/12 0659 02/12 0700  02/13 0659 02/13 0700 02/14 0659    I.V. (mL/kg) 2061.4 (13.4) 331.8 (2.2)     Blood       NG/ 510 30    IV Piggyback 256.8 1454.3 101.6    Total Intake(mL/kg) 2888.2 (18.8) 2296.1 (15) 131.6 (0.9)    Urine (mL/kg/hr) 745 (0.2) 660 (0.2) 5 (0)    Other 1506 3467 0    Stool 0 600     Total Output 2251 4727 5    Net +637.2 -2430.9 +126.6           Stool Occurrence 1 x 2 x              Physical Exam  Vitals and nursing note reviewed.   Constitutional:       General: She is not in acute distress.     Appearance: She is ill-appearing.   HENT:      Head: Normocephalic and atraumatic.   Eyes:      Extraocular Movements: Extraocular movements intact.      Conjunctiva/sclera: Conjunctivae normal.   Neck:      Comments: Left IJ Trialysis catheter  Cardiovascular:      Rate and Rhythm: Normal rate and regular rhythm.   Pulmonary:      Effort: Pulmonary effort is normal.      Comments: Vent Mode: Spont  Oxygen Concentration (%):  [40] 40  Resp Rate Total:  [19 br/min-30 br/min] 21 br/min  PEEP/CPAP:  [5 cmH20] 5 cmH20  Pressure Support:  [10 cmH20] 10 cmH20  Mean Airway Pressure:  [8.6 cmH20-9.2 cmH20] 8.6 cmH20     Abdominal:      General: There is no distension.      Palpations: Abdomen is soft.      Tenderness: There is no abdominal tenderness.   Genitourinary:     Comments: Glynn in place  Rectal Tube  Musculoskeletal:      Cervical back: Normal range of motion.   Skin:     General: Skin is warm and dry.      Comments: Wound vac in place to R thigh/groin to suction   Neurological:      General: No focal deficit present.      Mental Status: She is oriented to person, place, and time.   Psychiatric:         Mood and Affect: Mood normal.         Behavior: Behavior normal.          Significant Labs:  I have reviewed all pertinent lab results within the past 24 hours.  CBC:   Recent Labs   Lab  02/13/24  0301   WBC 13.13*   RBC 2.86*   HGB 7.7*   HCT 24.7*      MCV 86   MCH 26.9*   MCHC 31.2*       BMP:   Recent Labs   Lab 02/13/24  0301   *   *   K 4.4      CO2 24   BUN 15   CREATININE 1.1   CALCIUM 8.0*   MG 2.0         Significant Diagnostics:  I have reviewed all pertinent imaging results/findings within the past 24 hours.

## 2024-02-13 NOTE — ASSESSMENT & PLAN NOTE
Neuro/Psych:   #Acute Encephalopathy  CTH 2/3 with scattered hypoattenuation likely representing age indeterminate infarcts. EEG findings consistent with moderate-severe encephalopathy. MRI 2/6: Several scattered punctate acute infarcts throughout the bilateral frontal lobes, centrum semiovale, basal ganglia, corpus callosum, and cerebellar hemispheres.  CTA 2/6: Atherosclerotic plaquing of the carotid bifurcations and proximal ICAs with less than 50% proximal ICA stenosis by NASCET criteria . Repeat CTH and MRI/MRA unchanged from prior exams. Has currently been wound vac/debridement x3 and due for OR this AM.    Neuro/Psych  Repeat CTH and MRI/MRA stable from initial reads.   -- Sedation: None  -- Pain: kermit tylenol, dialudid and oxy prn  -- GCS 7T: E2, V1T, M4; exam stable  -- Neurology following; appreciate recs  -- Neurovascular following; appreciate recs  -- Palliative following; GOC conversation 2/7; appreciate recs  -- LP ordered. Neuro will perform under fluoro guidance.               Cards:   -- HDS  -- goal SBP < 180, MAP >65  -- hypertensive, hydralazine and labetalol prns  -- Continue amlodipine 10mg daily; coreg 25mg BID      Pulm:   #Acute Respiratory Failure  -- Intubated on spontaneous , Pressure support.  -- Goal O2 sat > 90%      Renal:  #ALVARADO on CKD  #ATN  Received HD 2/9. 2/13 RIJ trialysis removed and replaced with LIJ trialysis  -- Completed SLED overnight   -- Nephrology following; appreciate recs  -- RRT as needed    Recent Labs   Lab 02/12/24  1714 02/12/24  2143 02/13/24  0301   BUN 36*  36* 28* 15   CREATININE 2.6*  2.6* 1.9* 1.1        FEN / GI:   -- Daily CMPs  -- NGT in place   -- Replace lytes as needed  -- Nutrition: NPO, TF held. Will restart (Novasource Renal) at goal 30 ml/hr after OR  -- GI PPX   -- Nutrition following; appreciate recs  -- Continue psyllium      ID:    #Ayaz's gangrene  s/p wound vac exchange/ debridement x4 for nec fascitis. R groin tissue with proteus,  sensitive to ceftriaxone. Growing bacteroids as well.   -- Abx: ceftriaxone and flagyl EOT 2/22  -- ID following ; appreciate recs    Recent Labs   Lab 02/11/24  1458 02/12/24  0252 02/13/24  0301   WBC 12.56 12.59 13.13*        Heme/Onc:  -- Daily CBC  -- Heparin DVT ppx    Recent Labs   Lab 02/11/24  1458 02/12/24  0252 02/13/24  0301   HGB 7.4* 7.8* 7.7*    380 365        Endo:   #IDDM  Initially presented to OSH with DKA with latest A1C 10.4 AG Gap resolved  Placed on insulin gtt 2/3 and dced 2/12.    - q4h BG monitoring while NPO  - Detemir 12 units daily  - Novolog 6units q4h while on TFs  - Moderate dose SSI PRN  - Endocrine following, appreciate recs      PPx:   Feeding: NPO  Analgesia/Sedation: See above  Thromboembolic prevention: SQH   HOB >30: Y  Stress Ulcer ppx: Famotidine  Glucose control: Goal 140-180 g/dl, kermit detemir and novolog, SSI, Endo following  Bowel reg: psyllium  Invasive Lines/Drains/Airway: Glynn, ETT, LIJ Trialysis, PIV x2, Rectal tube      Dispo/Code Status/Palliative:   -- SICU / Full Code

## 2024-02-13 NOTE — ASSESSMENT & PLAN NOTE
BG goal 140-180  No previous hx of T2DM per family at bedside. A1c of 10.4. DKA at OSH. TF on..BG stable on regimen.    Plan:  - Continue Levemir 12 units daily.  - Continue Novolog 6 units q 4 hrs while on tube feeding (HOLD if tube feeding is stopped or BG < 100)   - Continue Moderate Dose Correction Scale  - BG monitoring q 4 hrs while NPO /TF    ** Please call Endocrine for any BG related issues **      Discharge plans: TBD    Lab Results   Component Value Date    HGBA1C 10.4 (H) 01/30/2024

## 2024-02-13 NOTE — PROGRESS NOTES
02/12/24 2004   Treatment   Treatment Type SLED   Treatment Status Restart   Dialysis Machine Number [id number]   Dialyzer Time (hours) 0   BVP (Liters) 0 L   Solutions Labeled and Current  Yes   Access Temporary Cath;Left;IJ   Catheter Dressing Intact  Yes   Alarms Engaged Yes   CRRT Comments CRRT RST   $ CRRT Charges   $ CRRT Charges Restart     Report received from primary RN. CRRT started per MD order.

## 2024-02-13 NOTE — ASSESSMENT & PLAN NOTE
Transfer from [facility]. Admitted for fourniers gangrene. Initiated HD on 1/31. ALVARADO 2/2 ATN in setting of septic shock. Arrived with CR 3.8. Unknown baseline.  Plan to OR today. Net -1.3L.OR today for debridement with possible closure and wound vac placement     ALVARADO most likley ATN in setting of septic shock     Plan/Recommendation  -plan for break from RRT today as urine output has picked up and laboratory studies without any acute indications  -Daily RFP   -Strict I&Os  -Avoid nephrotoxins, if possible

## 2024-02-13 NOTE — OP NOTE
[facility]  Surgery Department  Operative Note    SUMMARY     Patient: Sarah Saravia  Date: 2/12/2024  MRN: [id number]    Date of Procedure: 2/12/2024     Procedure: Procedure(s) (LRB):  REPLACEMENT, WOUND VAC (Right)  R thigh wound debridement (Right)     Surgeon(s) and Role:     * Mundo Carmona MD - Primary     * Chicho Dale MD - Resident - Assisting     * Marisa Fung MD - Resident - Chief    Pre-Operative Diagnosis: 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]    Post-Operative Diagnosis: Post-Op Diagnosis Codes:     * 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]    Anesthesia: Choice    Indications for Procedure:   Sarah Saravia is a 53 y.o.  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 She was taken to the OR today for repeat debridement and wound vac placement     Procedure in Detail:   After consent was verified, the patient was taken to the operating room and general anesthesia was initiated successfully. The patient was positioned in lithotomy. The right thigh and groin region was prepped and draped in the normal sterile fashion. Pre-operative antibiotics were administered. Time out was performed and all present were in agreement. We began the procedure by removing the previous wound vac. The wound required minimal debridement of fibrinous exudate. There was healthy granulation tissue in both the thigh wound and the mons pubis wound. A new black sponge was cute to size and placed in the wound. The wound vac tape and keily pad were applied and connected to the vac machine. There was good seal/suction. The concluded the procedure. All counts were reported as correct.    Dr. Ramos was present for the critical portions of the procedure.     Drains: Wound vac    Estimated Blood Loss (EBL): * No values recorded between 2/12/2024  2:19 PM and 2/12/2024  2:56 PM *    Total IV Fluids: See anesthesia log    Complications: No    Specimens:    Specimen (24h ago, onward)      None            Condition: Stable    Disposition: ICU - intubated and hemodynamically stable.    Marisa Fung MD  General Surgery PGY5

## 2024-02-14 ENCOUNTER — ANESTHESIA (OUTPATIENT)
Dept: INTENSIVE CARE | Facility: HOSPITAL | Age: 54
End: 2024-02-14

## 2024-02-14 ENCOUNTER — ANESTHESIA EVENT (OUTPATIENT)
Dept: SURGERY | Facility: HOSPITAL | Age: 54
DRG: 004 | End: 2024-02-14
Payer: MEDICAID

## 2024-02-14 ENCOUNTER — ANESTHESIA EVENT (OUTPATIENT)
Dept: INTENSIVE CARE | Facility: HOSPITAL | Age: 54
End: 2024-02-14

## 2024-02-14 PROBLEM — R50.9 FEVER: Status: ACTIVE | Noted: 2024-02-14

## 2024-02-14 LAB
ABO + RH BLD: NORMAL
ALBUMIN SERPL BCP-MCNC: 1.7 G/DL (ref 3.5–5.2)
ALBUMIN SERPL BCP-MCNC: 1.8 G/DL (ref 3.5–5.2)
ALLENS TEST: ABNORMAL
ALP SERPL-CCNC: 106 U/L (ref 55–135)
ALT SERPL W/O P-5'-P-CCNC: 9 U/L (ref 10–44)
AMORPH CRY UR QL COMP ASSIST: ABNORMAL
ANION GAP SERPL CALC-SCNC: 6 MMOL/L (ref 8–16)
ANION GAP SERPL CALC-SCNC: 8 MMOL/L (ref 8–16)
AST SERPL-CCNC: 26 U/L (ref 10–40)
BACTERIA #/AREA URNS AUTO: ABNORMAL /HPF
BACTERIA SPEC ANAEROBE CULT: ABNORMAL
BASOPHILS # BLD AUTO: 0.06 K/UL (ref 0–0.2)
BASOPHILS NFR BLD: 0.4 % (ref 0–1.9)
BILIRUB SERPL-MCNC: 0.2 MG/DL (ref 0.1–1)
BILIRUB UR QL STRIP: NEGATIVE
BLD GP AB SCN CELLS X3 SERPL QL: NORMAL
BUN SERPL-MCNC: 27 MG/DL (ref 6–20)
BUN SERPL-MCNC: 27 MG/DL (ref 6–20)
BUN SERPL-MCNC: 32 MG/DL (ref 6–20)
BUN SERPL-MCNC: 32 MG/DL (ref 6–20)
CA-I BLDV-SCNC: 1.09 MMOL/L (ref 1.06–1.42)
CA-I BLDV-SCNC: 1.12 MMOL/L (ref 1.06–1.42)
CA-I BLDV-SCNC: 1.12 MMOL/L (ref 1.06–1.42)
CALCIUM SERPL-MCNC: 8 MG/DL (ref 8.7–10.5)
CALCIUM SERPL-MCNC: 8.2 MG/DL (ref 8.7–10.5)
CALCIUM SERPL-MCNC: 8.5 MG/DL (ref 8.7–10.5)
CALCIUM SERPL-MCNC: 8.5 MG/DL (ref 8.7–10.5)
CHLORIDE SERPL-SCNC: 103 MMOL/L (ref 95–110)
CHLORIDE SERPL-SCNC: 104 MMOL/L (ref 95–110)
CHLORIDE SERPL-SCNC: 104 MMOL/L (ref 95–110)
CHLORIDE SERPL-SCNC: 105 MMOL/L (ref 95–110)
CLARITY UR REFRACT.AUTO: ABNORMAL
CO2 SERPL-SCNC: 22 MMOL/L (ref 23–29)
CO2 SERPL-SCNC: 23 MMOL/L (ref 23–29)
COLOR UR AUTO: ABNORMAL
CREAT SERPL-MCNC: 2.1 MG/DL (ref 0.5–1.4)
CREAT SERPL-MCNC: 2.7 MG/DL (ref 0.5–1.4)
CREAT SERPL-MCNC: 3.1 MG/DL (ref 0.5–1.4)
CREAT SERPL-MCNC: 3.1 MG/DL (ref 0.5–1.4)
CREAT UR-MCNC: 198 MG/DL (ref 15–325)
DIFFERENTIAL METHOD BLD: ABNORMAL
EOSINOPHIL # BLD AUTO: 0.2 K/UL (ref 0–0.5)
EOSINOPHIL NFR BLD: 1.8 % (ref 0–8)
ERYTHROCYTE [DISTWIDTH] IN BLOOD BY AUTOMATED COUNT: 18.3 % (ref 11.5–14.5)
EST. GFR  (NO RACE VARIABLE): 17.3 ML/MIN/1.73 M^2
EST. GFR  (NO RACE VARIABLE): 17.3 ML/MIN/1.73 M^2
EST. GFR  (NO RACE VARIABLE): 20.5 ML/MIN/1.73 M^2
EST. GFR  (NO RACE VARIABLE): 27.7 ML/MIN/1.73 M^2
GLUCOSE SERPL-MCNC: 140 MG/DL (ref 70–110)
GLUCOSE SERPL-MCNC: 162 MG/DL (ref 70–110)
GLUCOSE SERPL-MCNC: 162 MG/DL (ref 70–110)
GLUCOSE SERPL-MCNC: 179 MG/DL (ref 70–110)
GLUCOSE UR QL STRIP: NEGATIVE
HCO3 UR-SCNC: 23.3 MMOL/L (ref 24–28)
HCT VFR BLD AUTO: 22.4 % (ref 37–48.5)
HGB BLD-MCNC: 7.1 G/DL (ref 12–16)
HGB UR QL STRIP: ABNORMAL
HYALINE CASTS UR QL AUTO: 0 /LPF
IMM GRANULOCYTES # BLD AUTO: 0.09 K/UL (ref 0–0.04)
IMM GRANULOCYTES NFR BLD AUTO: 0.7 % (ref 0–0.5)
KETONES UR QL STRIP: ABNORMAL
LEUKOCYTE ESTERASE UR QL STRIP: ABNORMAL
LYMPHOCYTES # BLD AUTO: 1.9 K/UL (ref 1–4.8)
LYMPHOCYTES NFR BLD: 13.8 % (ref 18–48)
MAGNESIUM SERPL-MCNC: 1.9 MG/DL (ref 1.6–2.6)
MAGNESIUM SERPL-MCNC: 2 MG/DL (ref 1.6–2.6)
MAGNESIUM SERPL-MCNC: 2.1 MG/DL (ref 1.6–2.6)
MCH RBC QN AUTO: 27.7 PG (ref 27–31)
MCHC RBC AUTO-ENTMCNC: 31.7 G/DL (ref 32–36)
MCV RBC AUTO: 88 FL (ref 82–98)
MICROSCOPIC COMMENT: ABNORMAL
MONOCYTES # BLD AUTO: 1.7 K/UL (ref 0.3–1)
MONOCYTES NFR BLD: 12.6 % (ref 4–15)
NEUTROPHILS # BLD AUTO: 9.7 K/UL (ref 1.8–7.7)
NEUTROPHILS NFR BLD: 70.7 % (ref 38–73)
NITRITE UR QL STRIP: POSITIVE
NRBC BLD-RTO: 0 /100 WBC
PCO2 BLDA: 32.5 MMHG (ref 35–45)
PH SMN: 7.46 [PH] (ref 7.35–7.45)
PH UR STRIP: 5 [PH] (ref 5–8)
PHOSPHATE SERPL-MCNC: 4.9 MG/DL (ref 2.7–4.5)
PHOSPHATE SERPL-MCNC: 5.7 MG/DL (ref 2.7–4.5)
PHOSPHATE SERPL-MCNC: 6.4 MG/DL (ref 2.7–4.5)
PHOSPHATE SERPL-MCNC: 6.4 MG/DL (ref 2.7–4.5)
PLATELET # BLD AUTO: 357 K/UL (ref 150–450)
PMV BLD AUTO: 9.6 FL (ref 9.2–12.9)
PO2 BLDA: 167 MMHG (ref 80–100)
POC BE: 0 MMOL/L
POC SATURATED O2: 100 % (ref 95–100)
POC TCO2: 24 MMOL/L (ref 23–27)
POCT GLUCOSE: 155 MG/DL (ref 70–110)
POCT GLUCOSE: 164 MG/DL (ref 70–110)
POCT GLUCOSE: 165 MG/DL (ref 70–110)
POCT GLUCOSE: 167 MG/DL (ref 70–110)
POCT GLUCOSE: 169 MG/DL (ref 70–110)
POCT GLUCOSE: 182 MG/DL (ref 70–110)
POTASSIUM SERPL-SCNC: 4.4 MMOL/L (ref 3.5–5.1)
POTASSIUM SERPL-SCNC: 4.6 MMOL/L (ref 3.5–5.1)
POTASSIUM SERPL-SCNC: 4.6 MMOL/L (ref 3.5–5.1)
POTASSIUM SERPL-SCNC: 4.9 MMOL/L (ref 3.5–5.1)
PROT SERPL-MCNC: 6.7 G/DL (ref 6–8.4)
PROT UR QL STRIP: ABNORMAL
PROT UR-MCNC: 129 MG/DL (ref 0–15)
PROT/CREAT UR: 0.65 MG/G{CREAT} (ref 0–0.2)
RBC # BLD AUTO: 2.56 M/UL (ref 4–5.4)
RBC #/AREA URNS AUTO: 7 /HPF (ref 0–4)
SAMPLE: ABNORMAL
SITE: ABNORMAL
SODIUM SERPL-SCNC: 132 MMOL/L (ref 136–145)
SODIUM SERPL-SCNC: 133 MMOL/L (ref 136–145)
SODIUM SERPL-SCNC: 133 MMOL/L (ref 136–145)
SODIUM SERPL-SCNC: 135 MMOL/L (ref 136–145)
SP GR UR STRIP: 1.02 (ref 1–1.03)
SPECIMEN OUTDATE: NORMAL
SQUAMOUS #/AREA URNS AUTO: 1 /HPF
URN SPEC COLLECT METH UR: ABNORMAL
WBC # BLD AUTO: 13.67 K/UL (ref 3.9–12.7)
WBC #/AREA URNS AUTO: 27 /HPF (ref 0–5)

## 2024-02-14 PROCEDURE — 63600150 PHARM REV CODE 636

## 2024-02-14 PROCEDURE — 63600175 PHARM REV CODE 636 W HCPCS: Mod: JZ,JG | Performed by: INTERNAL MEDICINE

## 2024-02-14 PROCEDURE — 99900035 HC TECH TIME PER 15 MIN (STAT)

## 2024-02-14 PROCEDURE — 83735 ASSAY OF MAGNESIUM: CPT | Mod: 91 | Performed by: STUDENT IN AN ORGANIZED HEALTH CARE EDUCATION/TRAINING PROGRAM

## 2024-02-14 PROCEDURE — 25000242 PHARM REV CODE 250 ALT 637 W/ HCPCS

## 2024-02-14 PROCEDURE — 90945 DIALYSIS ONE EVALUATION: CPT

## 2024-02-14 PROCEDURE — 99291 CRITICAL CARE FIRST HOUR: CPT | Mod: ,,, | Performed by: SURGERY

## 2024-02-14 PROCEDURE — 99233 SBSQ HOSP IP/OBS HIGH 50: CPT | Mod: ,,, | Performed by: INTERNAL MEDICINE

## 2024-02-14 PROCEDURE — 80069 RENAL FUNCTION PANEL: CPT | Performed by: STUDENT IN AN ORGANIZED HEALTH CARE EDUCATION/TRAINING PROGRAM

## 2024-02-14 PROCEDURE — 99900026 HC AIRWAY MAINTENANCE (STAT)

## 2024-02-14 PROCEDURE — 25000003 PHARM REV CODE 250: Performed by: STUDENT IN AN ORGANIZED HEALTH CARE EDUCATION/TRAINING PROGRAM

## 2024-02-14 PROCEDURE — 99232 SBSQ HOSP IP/OBS MODERATE 35: CPT | Mod: ,,, | Performed by: PHYSICIAN ASSISTANT

## 2024-02-14 PROCEDURE — 87086 URINE CULTURE/COLONY COUNT: CPT | Mod: 59 | Performed by: INTERNAL MEDICINE

## 2024-02-14 PROCEDURE — 80069 RENAL FUNCTION PANEL: CPT | Mod: 91 | Performed by: STUDENT IN AN ORGANIZED HEALTH CARE EDUCATION/TRAINING PROGRAM

## 2024-02-14 PROCEDURE — 37799 UNLISTED PX VASCULAR SURGERY: CPT

## 2024-02-14 PROCEDURE — 83735 ASSAY OF MAGNESIUM: CPT | Performed by: STUDENT IN AN ORGANIZED HEALTH CARE EDUCATION/TRAINING PROGRAM

## 2024-02-14 PROCEDURE — 87088 URINE BACTERIA CULTURE: CPT

## 2024-02-14 PROCEDURE — 80053 COMPREHEN METABOLIC PANEL: CPT

## 2024-02-14 PROCEDURE — 94003 VENT MGMT INPAT SUBQ DAY: CPT

## 2024-02-14 PROCEDURE — 93005 ELECTROCARDIOGRAM TRACING: CPT

## 2024-02-14 PROCEDURE — 84156 ASSAY OF PROTEIN URINE: CPT | Performed by: INTERNAL MEDICINE

## 2024-02-14 PROCEDURE — 85025 COMPLETE CBC W/AUTO DIFF WBC: CPT

## 2024-02-14 PROCEDURE — 93010 ELECTROCARDIOGRAM REPORT: CPT | Mod: ,,, | Performed by: INTERNAL MEDICINE

## 2024-02-14 PROCEDURE — 87077 CULTURE AEROBIC IDENTIFY: CPT

## 2024-02-14 PROCEDURE — 27100171 HC OXYGEN HIGH FLOW UP TO 24 HOURS

## 2024-02-14 PROCEDURE — 81001 URINALYSIS AUTO W/SCOPE: CPT | Performed by: INTERNAL MEDICINE

## 2024-02-14 PROCEDURE — 63600175 PHARM REV CODE 636 W HCPCS

## 2024-02-14 PROCEDURE — 25000003 PHARM REV CODE 250

## 2024-02-14 PROCEDURE — 82330 ASSAY OF CALCIUM: CPT | Performed by: STUDENT IN AN ORGANIZED HEALTH CARE EDUCATION/TRAINING PROGRAM

## 2024-02-14 PROCEDURE — 87086 URINE CULTURE/COLONY COUNT: CPT

## 2024-02-14 PROCEDURE — 87186 SC STD MICRODIL/AGAR DIL: CPT

## 2024-02-14 PROCEDURE — 20000000 HC ICU ROOM

## 2024-02-14 PROCEDURE — 94761 N-INVAS EAR/PLS OXIMETRY MLT: CPT

## 2024-02-14 PROCEDURE — 84100 ASSAY OF PHOSPHORUS: CPT | Performed by: STUDENT IN AN ORGANIZED HEALTH CARE EDUCATION/TRAINING PROGRAM

## 2024-02-14 RX ORDER — HYDROCODONE BITARTRATE AND ACETAMINOPHEN 500; 5 MG/1; MG/1
TABLET ORAL CONTINUOUS
Status: DISCONTINUED | OUTPATIENT
Start: 2024-02-14 | End: 2024-02-15

## 2024-02-14 RX ORDER — MAGNESIUM SULFATE HEPTAHYDRATE 40 MG/ML
2 INJECTION, SOLUTION INTRAVENOUS
Status: DISCONTINUED | OUTPATIENT
Start: 2024-02-14 | End: 2024-02-15

## 2024-02-14 RX ORDER — METRONIDAZOLE 500 MG/100ML
500 INJECTION, SOLUTION INTRAVENOUS
Status: DISCONTINUED | OUTPATIENT
Start: 2024-02-14 | End: 2024-02-19

## 2024-02-14 RX ORDER — OXYCODONE HCL 5 MG/5 ML
5 SOLUTION, ORAL ORAL EVERY 6 HOURS PRN
Status: DISCONTINUED | OUTPATIENT
Start: 2024-02-14 | End: 2024-02-22

## 2024-02-14 RX ADMIN — INSULIN ASPART 6 UNITS: 100 INJECTION, SOLUTION INTRAVENOUS; SUBCUTANEOUS at 08:02

## 2024-02-14 RX ADMIN — SEVELAMER CARBONATE 1.6 G: 800 POWDER, FOR SUSPENSION ORAL at 11:02

## 2024-02-14 RX ADMIN — CEFTRIAXONE 2 G: 2 INJECTION, POWDER, FOR SOLUTION INTRAMUSCULAR; INTRAVENOUS at 11:02

## 2024-02-14 RX ADMIN — CARVEDILOL 25 MG: 25 TABLET, FILM COATED ORAL at 08:02

## 2024-02-14 RX ADMIN — INSULIN DETEMIR 14 UNITS: 100 INJECTION, SOLUTION SUBCUTANEOUS at 08:02

## 2024-02-14 RX ADMIN — HEPARIN SODIUM 7500 UNITS: 5000 INJECTION INTRAVENOUS; SUBCUTANEOUS at 06:02

## 2024-02-14 RX ADMIN — PSYLLIUM HUSK 1 PACKET: 3.4 POWDER ORAL at 08:02

## 2024-02-14 RX ADMIN — METRONIDAZOLE 500 MG: 5 INJECTION, SOLUTION INTRAVENOUS at 11:02

## 2024-02-14 RX ADMIN — INSULIN ASPART 1 UNITS: 100 INJECTION, SOLUTION INTRAVENOUS; SUBCUTANEOUS at 04:02

## 2024-02-14 RX ADMIN — SEVELAMER CARBONATE 1.6 G: 800 POWDER, FOR SUSPENSION ORAL at 08:02

## 2024-02-14 RX ADMIN — INSULIN ASPART 1 UNITS: 100 INJECTION, SOLUTION INTRAVENOUS; SUBCUTANEOUS at 08:02

## 2024-02-14 RX ADMIN — SODIUM CHLORIDE: 9 INJECTION, SOLUTION INTRAVENOUS at 09:02

## 2024-02-14 RX ADMIN — INSULIN ASPART 6 UNITS: 100 INJECTION, SOLUTION INTRAVENOUS; SUBCUTANEOUS at 04:02

## 2024-02-14 RX ADMIN — INSULIN ASPART 1 UNITS: 100 INJECTION, SOLUTION INTRAVENOUS; SUBCUTANEOUS at 12:02

## 2024-02-14 RX ADMIN — AMLODIPINE BESYLATE 10 MG: 10 TABLET ORAL at 08:02

## 2024-02-14 RX ADMIN — METRONIDAZOLE 500 MG: 5 INJECTION, SOLUTION INTRAVENOUS at 08:02

## 2024-02-14 RX ADMIN — INSULIN ASPART 1 UNITS: 100 INJECTION, SOLUTION INTRAVENOUS; SUBCUTANEOUS at 03:02

## 2024-02-14 RX ADMIN — FAMOTIDINE 20 MG: 20 TABLET, FILM COATED ORAL at 08:02

## 2024-02-14 RX ADMIN — SEVELAMER CARBONATE 1.6 G: 800 POWDER, FOR SUSPENSION ORAL at 04:02

## 2024-02-14 RX ADMIN — METRONIDAZOLE 500 MG: 5 INJECTION, SOLUTION INTRAVENOUS at 01:02

## 2024-02-14 RX ADMIN — FUROSEMIDE 200 MG: 10 INJECTION, SOLUTION INTRAMUSCULAR; INTRAVENOUS at 02:02

## 2024-02-14 NOTE — SUBJECTIVE & OBJECTIVE
"Interval HPI:   S/p wound debridement and wound vac exchange . Patient in room 08011/95866 A. Blood glucose stable. BG at or above goal on current insulin regimen (scheduled insulin). Steroid use- None .   Renal function- Abnormal -   Vasopressors-  None      Diet NPO      Eating:   NPO  Nausea: No  Hypoglycemia and intervention: No  Fever: No  TPN and/or TF: Yes 30 cc/hr (on hold for procedure)    BP (!) 142/68   Pulse 94   Temp 98.8 °F (37.1 °C) (Oral)   Resp (!) 27   Ht 5' 5" (1.651 m)   Wt (!) 153.3 kg (338 lb)   LMP 01/01/2024 (Approximate) Comment: tubal ligation  SpO2 99%   BMI 56.25 kg/m²     Labs Reviewed and Include    Recent Labs   Lab 02/14/24  0251   *   CALCIUM 8.2*   ALBUMIN 1.8*   PROT 6.7   *   K 4.9   CO2 22*      BUN 27*   CREATININE 2.7*   ALKPHOS 106   ALT 9*   AST 26   BILITOT 0.2     Lab Results   Component Value Date    WBC 13.67 (H) 02/14/2024    HGB 7.1 (L) 02/14/2024    HCT 22.4 (L) 02/14/2024    MCV 88 02/14/2024     02/14/2024     No results for input(s): "TSH", "FREET4" in the last 168 hours.  Lab Results   Component Value Date    HGBA1C 10.4 (H) 01/30/2024       Nutritional status:   Body mass index is 56.25 kg/m².  Lab Results   Component Value Date    ALBUMIN 1.8 (L) 02/14/2024    ALBUMIN 1.8 (L) 02/13/2024    ALBUMIN 1.8 (L) 02/13/2024    ALBUMIN 1.8 (L) 02/13/2024     No results found for: "PREALBUMIN"    Estimated Creatinine Clearance: 36.3 mL/min (A) (based on SCr of 2.7 mg/dL (H)).    Accu-Checks  Recent Labs     02/13/24  0039 02/13/24  0301 02/13/24  0748 02/13/24  1210 02/13/24  1552 02/13/24  1910 02/13/24  2327 02/13/24  2328 02/14/24  0244 02/14/24  0738   POCTGLUCOSE 158* 158* 198* 206* 173* 183* 220* 183* 182* 164*       Current Medications and/or Treatments Impacting Glycemic Control  Immunotherapy:    Immunosuppressants       None          Steroids:   Hormones (From admission, onward)      None          Pressors:    Autonomic Drugs " (From admission, onward)      None          Hyperglycemia/Diabetes Medications:   Antihyperglycemics (From admission, onward)      Start     Stop Route Frequency Ordered    02/13/24 0915  insulin detemir U-100 (Levemir) pen 14 Units         -- SubQ Daily 02/13/24 0906    02/09/24 1200  insulin aspart U-100 pen 6 Units         -- SubQ Every 4 hours 02/09/24 0901    02/03/24 1010  insulin aspart U-100 pen 0-10 Units         -- SubQ Every 4 hours PRN 02/03/24 0911

## 2024-02-14 NOTE — ANESTHESIA PREPROCEDURE EVALUATION
[facility]  Anesthesia Pre-Operative Evaluation         Patient Name: Sarah Saravia  YOB: 1970  MRN: [id number]    SUBJECTIVE:     Pre-operative evaluation for Procedure(s) (LRB):  REPLACEMENT, WOUND VAC (Right)  R thigh wound debridement (Right)      [facility] 02/14/2024:  Sarah Saravia is a 53 y.o. 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 CTH with multiple age indeterminate infarcts and scattered acute infarcts.    Presents for above procedure.    Oxygen/Ventilation Requirements:  Vent Mode: Spont  Oxygen Concentration (%):  [30-40] 30  Resp Rate Total:  [22 br/min-39 br/min] 26 br/min  PEEP/CPAP:  [5 cmH20] 5 cmH20  Pressure Support:  [10 cmH20] 10 cmH20  Mean Airway Pressure:  [9 cmH20-9.7 cmH20] 9 cmH20        Patient Active Problem List   Diagnosis    Diabetic acidosis without coma    Ayaz's gangrene    Morbid (severe) obesity due to excess calories    Severe sepsis    ALVARADO (acute kidney injury)    Hyponatremia    Metabolic acidosis    Encephalopathy, metabolic    Acute hypoxemic respiratory failure    Weaning from mechanically assisted ventilation initiated    Acute blood loss anemia    Acute renal failure with tubular necrosis    New onset type 2 diabetes mellitus    Encephalopathy    Leukocytosis    Ac isch multi vasc territories stroke    Cerebral infarction    Encounter for palliative care    Hypoalbuminemia    Hyperphosphatemia       Review of patient's allergies indicates:  No Known Allergies    Outpatient Medications:  No current facility-administered medications on file prior to encounter.     No current outpatient medications on file prior to encounter.        Current Inpatient Medications:   sodium chloride 0.9%   Intravenous Once    amLODIPine  10 mg Per OG tube Daily    carvediloL  25 mg Per OG tube BID    ceFEPime IV (PEDS and ADULTS)  2 g Intravenous  Q12H    famotidine  20 mg Per OG tube Daily    heparin (porcine)  7,500 Units Subcutaneous Q8H    insulin aspart U-100  6 Units Subcutaneous Q4H    insulin detemir U-100  14 Units Subcutaneous Daily    metronidazole  500 mg Intravenous Q8H    psyllium husk (aspartame)  1 packet Per OG tube BID    sevelamer carbonate  1.6 g Per OG tube TID WM       Past Surgical History:   Procedure Laterality Date    INCISION AND DRAINAGE OF PERIRECTAL REGION N/A 1/29/2024    Procedure: INCISION AND DRAINAGE, PERIRECTAL REGION;  Surgeon: Axel Ramsay MD;  Location: [facility] OR;  Service: General;  Laterality: N/A;    PLACEMENT, TRIALYSIS CATH Right 1/31/2024    Procedure: INSERTION, CATHETER, TRIPLE LUMEN, HEMODIALYSIS, TEMPORARY;  Surgeon: Alvin Junior MD;  Location: [facility] OR;  Service: General;  Laterality: Right;    REPLACEMENT OF WOUND VACUUM-ASSISTED CLOSURE DEVICE Right 2/12/2024    Procedure: REPLACEMENT, WOUND VAC;  Surgeon: Mundo Carmona MD;  [facility];  Service: General;  Laterality: Right;    WOUND DEBRIDEMENT Bilateral 2/2/2024    Procedure: DEBRIDEMENT, WOUND;  Surgeon: Steve Cole MD;  [facility];  Service: General;  Laterality: Bilateral;  Bilateral groin  Possible wound vac placement    WOUND DEBRIDEMENT Right 2/6/2024    Procedure: DEBRIDEMENT, WOUND, replace wound vac, possible closure;  Surgeon: Steve Cole MD;  [facility];  Service: General;  Laterality: Right;  RLE    WOUND DEBRIDEMENT Right 2/9/2024    Procedure: DEBRIDEMENT, WOUND w wound vac change;  Surgeon: Steve Cole MD;  [facility];  Service: General;  Laterality: Right;  RLE    WOUND DEBRIDEMENT Right 2/12/2024    Procedure: R thigh wound debridement;  Surgeon: Mundo Carmona MD;  [facility];  Service: General;  Laterality: Right;    WOUND EXPLORATION Right 1/31/2024    Procedure: IRRIGATION & DEBRIDEMENT, WOUND DEBRIDEMENT;  Surgeon:  Alvin Junior MD;  Location: [facility];  Service: General;  Laterality: Right;       Social History     Socioeconomic History    Marital status: Single     Social Determinants of Health     Financial Resource Strain: Patient Unable To Answer (1/31/2024)    Overall Financial Resource Strain (CARDIA)     Difficulty of Paying Living Expenses: Patient unable to answer   Food Insecurity: Patient Declined (1/31/2024)    Hunger Vital Sign     Worried About Running Out of Food in the Last Year: Patient declined     Ran Out of Food in the Last Year: Patient declined   Transportation Needs: Patient Unable To Answer (1/31/2024)    PRAPARE - Transportation     Lack of Transportation (Medical): Patient unable to answer     Lack of Transportation (Non-Medical): Patient unable to answer   Stress: Patient Unable To Answer (1/31/2024)    [employer] - Occupational Stress Questionnaire     Feeling of Stress : Patient unable to answer   Housing Stability: Patient Unable To Answer (1/31/2024)    Housing Stability Vital Sign     Unable to Pay for Housing in the Last Year: Patient unable to answer     Unstable Housing in the Last Year: Patient unable to answer       OBJECTIVE:     Weight:  Wt Readings from Last 1 Encounters:   02/08/24 (!) 153.3 kg (338 lb)     Body mass index is 56.25 kg/m².    Recent Blood Pressure Readings:  BP Readings from Last 3 Encounters:   02/14/24 (!) 142/68       Vital Signs Range (Last 24H):  Temp:  [37 °C (98.6 °F)-39.1 °C (102.4 °F)]   Pulse:  []   Resp:  [23-39]   BP: (117-176)/(56-78)   SpO2:  [98 %-100 %]       CBC:   Lab Results   Component Value Date    WBC 13.67 (H) 02/14/2024    HGB 7.1 (L) 02/14/2024    HCT 22.4 (L) 02/14/2024    MCV 88 02/14/2024     02/14/2024       CMP:     Chemistry        Component Value Date/Time     (L) 02/14/2024 0251    K 4.9 02/14/2024 0251     02/14/2024 0251    CO2 22 (L) 02/14/2024 0251    BUN 27 (H)  02/14/2024 0251    CREATININE 2.7 (H) 02/14/2024 0251     (H) 02/14/2024 0251        Component Value Date/Time    CALCIUM 8.2 (L) 02/14/2024 0251    ALKPHOS 106 02/14/2024 0251    AST 26 02/14/2024 0251    ALT 9 (L) 02/14/2024 0251    BILITOT 0.2 02/14/2024 0251            INR:  Lab Results   Component Value Date    INR 1.0 02/01/2024       Diagnostic Studies:      EKG:     Results for orders placed or performed during the hospital encounter of 01/29/24   EKG 12-lead    Collection Time: 02/12/24 11:48 PM   Result Value Ref Range    QRS Duration 148 ms    [facility] QTC Calculation 490 ms    Narrative    Test Reason : R94.31,    Vent. Rate : 094 BPM     Atrial Rate : 094 BPM     P-R Int : 176 ms          QRS Dur : 148 ms      QT Int : 392 ms       P-R-T Axes : 055 054 021 degrees     QTc Int : 490 ms    Normal sinus rhythm  Right bundle branch block  Abnormal ECG  When compared with ECG of 29-JAN-2024 19:14,  Right bundle branch block is now Present  Confirmed by Chris Montenegro MD ([id number]) on 2/13/2024 8:39:13 AM    Referred By: [email or web address]   SELF           Confirmed By:Chris Montenegro MD       2D Echo:    No results found for this or any previous visit.    Results for orders placed or performed during the hospital encounter of 01/29/24   Echo   Result Value Ref Range    RA Width 3.19 cm    Left Atrium Major Axis 5.14 cm    Left Atrium Minor Axis 5.35 cm    RA Major Axis 4.87 cm    LV Diastolic Volume 81.98 mL    LV Systolic Volume 27.87 mL    MV Peak E Tyler 0.77 m/s    Ao VTI 31.37 cm    Ao peak tyler 2.01 m/s    LVOT peak VTI 16.92 cm    LVOT peak tyler 1.29 m/s    LVOT diameter 2.02 cm    AV mean gradient 10 mmHg    TAPSE 1.72 cm    RVDD 3.16 cm    LA size 3.99 cm    Ascending aorta 2.57 cm    STJ 2.32 cm    Sinus 2.79 cm    LVIDs 2.73 2.1 - 4.0 cm    Posterior Wall 1.1 0.6 - 1.1 cm    IVS 1.3 (A) 0.6 - 1.1 cm    LVIDd 5.0 3.5 - 6.0 cm    TDI LATERAL 0.10 m/s    LA WIDTH 4.17 cm    TDI SEPTAL 0.07 m/s    LV LATERAL E/E'  RATIO 7.70 m/s    LV SEPTAL E/E' RATIO 11.00 m/s    FS 45 (A) 28 - 44 %    LA volume 74.15 cm3    LV mass 233.75 g    ZLVIDD -11.05     ZLVIDS -9.36     Left Ventricle Relative Wall Thickness 0.44 cm    AV valve area 1.73 cm²    AV Velocity Ratio 0.64     AV index (prosthetic) 0.54     Mean e' 0.09 m/s    LVOT area 3.2 cm2    LVOT stroke volume 54.20 cm3    AV peak gradient 16 mmHg    E/E' ratio 9.06 m/s    LV Systolic Volume Index 11.0 mL/m2    LV Diastolic Volume Index 32.40 mL/m2    LA Volume Index 29.3 mL/m2    LV Mass Index 92 g/m2    EDWARD by Velocity Ratio 2.06 cm²    BSA 2.72 m2    Est. RA pres 3 mmHg    Narrative      Left Ventricle: The left ventricle is normal in size. Normal wall   thickness. There is concentric remodeling. Normal wall motion. There is   normal systolic function with a visually estimated ejection fraction of 60   - 65%. There is normal diastolic function. Normal left ventricular filling   pressure.    Right Ventricle: Normal right ventricular cavity size. Wall thickness   is normal. Right ventricle wall motion  is normal. Systolic function is   normal.    Aorta: Aortic root is normal in size measuring 2.79 cm. Ascending aorta   is normal measuring 2.57 cm.    IVC/SVC: Normal venous pressure at 3 mmHg.         No results found for this or any previous visit.      ASSESSMENT/PLAN:           Pre-op Assessment    I have reviewed the Patient Summary Reports.     I have reviewed the Nursing Notes. I have reviewed the NPO Status.      Review of Systems  Anesthesia Hx:  No problems with previous Anesthesia   History of prior surgery of interest to airway management or planning:          Denies Family Hx of Anesthesia complications.    Denies Personal Hx of Anesthesia complications.                    Social:  Non-Smoker       Hematology/Oncology:  Hematology Normal   Oncology Normal                                   EENT/Dental:  EENT/Dental Normal           Cardiovascular:         Denies  CABG/stent.     Denies CHF.                                 Pulmonary:    Denies COPD.  Denies Asthma.                    Renal/:  Chronic Renal Disease, ARF, Dialysis                Hepatic/GI:  Hepatic/GI Normal                 Musculoskeletal:  Musculoskeletal Normal                Neurological:   CVA    Denies Seizures.                                Endocrine:  Diabetes         Morbid Obesity / BMI > 40  Dermatological:  Skin Normal    Psych:  Psychiatric Normal                  Physical Exam  General: Well nourished    Airway:  Mallampati: unable to assess   Pre-Existing Airway: Oral Endotracheal tube    Chest/Lungs:  Mechanically ventilated  Heart:  Rate: Normal      Anesthesia Plan  Type of Anesthesia, risks & benefits discussed:    Anesthesia Type: Gen ETT  Intra-op Monitoring Plan: Standard ASA Monitors  Post Op Pain Control Plan: multimodal analgesia and IV/PO Opioids PRN  Induction:  IV  Airway Plan: Direct  Informed Consent: Informed consent signed with the Patient representative and all parties understand the risks and agree with anesthesia plan.  All questions answered.   ASA Score: 4  Day of Surgery Review of History & Physical: H&P Update referred to the surgeon/provider.    Ready For Surgery From Anesthesia Perspective.     .

## 2024-02-14 NOTE — PROGRESS NOTES
[facility]  Critical Care - Surgery  Progress Note    Patient Name: Sarah Saravia  MRN: [id number]  Admission Date: 1/29/2024  Hospital Length of Stay: 16 days  Code Status: Full Code  Attending Provider: Steve Cole MD  Primary Care Provider: [facility]   Principal Problem: Ayaz's gangrene    Subjective:     Hospital/ICU Course:  No notes on file    Interval History/Significant Events: Pt seen and examined at bedside. Febrile overnight. Improved with tylenol and cooling measures. Intermittent mouth movement to rigorous or lower extremities stimulation.       Follow-up For: Procedure(s) (LRB):  REPLACEMENT, WOUND VAC (Right)  R thigh wound debridement (Right)    Post-Operative Day: 2 Days Post-Op    Objective:     Vital Signs (Most Recent):  Temp: 98.8 °F (37.1 °C) (02/14/24 0300)  Pulse: 94 (02/14/24 0747)  Resp: (!) 27 (02/14/24 0747)  BP: (!) 142/68 (02/14/24 0747)  SpO2: 99 % (02/14/24 0747) Vital Signs (24h Range):  Temp:  [98.6 °F (37 °C)-102.4 °F (39.1 °C)] 98.8 °F (37.1 °C)  Pulse:  [] 94  Resp:  [23-39] 27  SpO2:  [98 %-100 %] 99 %  BP: (117-179)/(56-78) 142/68     Weight: (!) 153.3 kg (338 lb)  Body mass index is 56.25 kg/m².      Intake/Output Summary (Last 24 hours) at 2/14/2024 0756  Last data filed at 2/14/2024 0600  Gross per 24 hour   Intake 843.2 ml   Output 830 ml   Net 13.2 ml          Physical Exam      Appearance: She is obese.   HENT:      Head: Normocephalic.      Mouth/Throat:      Mouth: Mucous membranes are moist.   Eyes:      Comments: PERRL, no gaze  Neck:      Comments: LIJ Trialysis  Cardiovascular:      Rate and Rhythm: Normal rate and regular rhythm.   Pulmonary:      Effort: Pulmonary effort is normal.      Comments: Intubated  Abdominal:      General: Abdomen is flat.      Palpations: Abdomen is soft.   Genitourinary:     Comments: R groin wound   Glynn in place  Rectal tube  Musculoskeletal:       BLE nonedematous    Skin:     General: Skin is warm.   Neurological:      Comments: intermittent mouth movement to rigorous or LE stimulation. No spontaneous eyes opening.     Vents:  Vent Mode: Spont (02/14/24 0747)  Set Rate: 18 BPM (02/06/24 0349)  Vt Set: 420 mL (02/06/24 0349)  Pressure Support: 10 cmH20 (02/14/24 0747)  PEEP/CPAP: 5 cmH20 (02/14/24 0747)  Oxygen Concentration (%): 30 (02/14/24 0747)  Peak Airway Pressure: 15 cmH20 (02/14/24 0747)  Plateau Pressure: 15 cmH20 (02/14/24 0747)  Total Ve: 10.3 L/m (02/14/24 0747)  Negative Inspiratory Force (cm H2O): 0 (02/14/24 0747)  F/VT Ratio<105 (RSBI): (!) 61.64 (02/14/24 0747)    Lines/Drains/Airways       Central Venous Catheter Line  Duration             Trialysis (Dialysis) Catheter 02/11/24 2303 left internal jugular 2 days              Drain  Duration                  Rectal Tube 02/04/24 1545 9 days         NG/OG Tube 02/06/24 1030 Center mouth 7 days         Urethral Catheter 02/06/24 1900 7 days              Airway  Duration                  Airway - Non-Surgical 01/31/24 1020 13 days              Peripheral Intravenous Line  Duration                  Peripheral IV - Single Lumen 02/13/24 0548 18 G Left Antecubital 1 day         Peripheral IV - Single Lumen 02/13/24 1132 18 G;1 3/4 in Right Forearm <1 day                    Significant Labs:    CBC/Anemia Profile:  Recent Labs   Lab 02/13/24  0301 02/13/24 2032 02/14/24  0251   WBC 13.13* 14.64* 13.67*   HGB 7.7* 7.4* 7.1*   HCT 24.7* 23.3* 22.4*    361 357   MCV 86 85 88   RDW 17.7* 18.5* 18.3*        Chemistries:  Recent Labs   Lab 02/13/24  0301 02/13/24  1103 02/13/24  1346 02/13/24 2032 02/14/24  0251   *  --  133*  133* 134*  --    K 4.4  --  4.5  4.5 4.8  --      --  104  104 104 105   CO2 24  --  23  23 22* 22*   BUN 15  --  19  19 23* 27*   CREATININE 1.1  --  2.0*  2.0* 2.5* 2.7*   CALCIUM 8.0*  --  8.1*  8.1* 8.3*  --    ALBUMIN 1.8*  --  1.8*  1.8* 1.8* 1.8*   PROT 7.1  --   --    --   --    BILITOT 0.3  --   --   --   --    ALKPHOS 130  --   --   --  106   ALT 14  --   --   --  9*   AST 25  --   --   --  26   MG 2.0  --  2.1  2.1  --  2.0   PHOS 2.1* 4.7* 4.7*  4.7* 4.9*  --          Significant Imaging:  I have reviewed all pertinent imaging results/findings within the past 24 hours.  Assessment/Plan:     Renal/  * Ayaz's gangrene    Neuro/Psych:   #Acute Encephalopathy  CTH 2/3 with scattered hypoattenuation likely representing age indeterminate infarcts. EEG findings consistent with moderate-severe encephalopathy. MRI 2/6: Several scattered punctate acute infarcts throughout the bilateral frontal lobes, centrum semiovale, basal ganglia, corpus callosum, and cerebellar hemispheres.  CTA 2/6: Atherosclerotic plaquing of the carotid bifurcations and proximal ICAs with less than 50% proximal ICA stenosis by NASCET criteria . Repeat CTH and MRI/MRA unchanged from prior exams..    Neuro/Psych  Repeat CTH and MRI/MRA stable from initial reads.   -- Sedation: None  -- Pain: kermit tylenol, dialudid and oxy prn  -- GCS 7T: E2, V1T, M4; exam stable  -- Neurology following; appreciate recs  -- Neurovascular following; appreciate recs  -- Palliative following; GOC conversation 2/7; appreciate recs  -- LP tomorrow by anesthesia. Hold lovenox             Cards:   -- HDS  -- goal SBP < 180, MAP >65  -- hypertensive, hydralazine and labetalol prns  -- Continue amlodipine 10mg daily; coreg 25mg BID      Pulm:   #Acute Respiratory Failure  -- Intubated on spontaneous  -- Goal O2 sat > 90%  -- F/u primary plan on trach.      Renal:  #ALVARADO on CKD  #ATN  Received HD 2/9. 2/13 LISOL trialysis  -- Nephrology following; appreciate recs  -- RRT as needed    Recent Labs   Lab 02/13/24  1346 02/13/24 2032 02/14/24  0251   BUN 19  19 23* 27*   CREATININE 2.0*  2.0* 2.5* 2.7*        FEN / GI:   -- Daily CMPs  -- NGT in place   -- Replace lytes as needed  -- Nutrition: NPO, Resume TF. Will restart (Novasource  Renal) at goal 30 ml/hr after OR  -- GI PPX   -- Nutrition following; appreciate recs  -- Continue psyllium      ID:    #Ayaz's gangrene  s/p wound vac exchange/ debridement x4 for nec fascitis. R groin tissue with proteus, sensitive to ceftriaxone. Growing bacteroids as well.   -- Abx: DC flagyl, cefepime. Start Ceftriaxone.  -- ID consulted  -- Febrile overnight. White count down trending. Obtain urine culture.    Recent Labs   Lab 02/13/24 0301 02/13/24 2032 02/14/24  0251   WBC 13.13* 14.64* 13.67*        Heme/Onc:  -- Daily CBC  -- Heparin DVT ppx. Hold heparin for 12 hours before LP    Recent Labs   Lab 02/13/24 0301 02/13/24 2032 02/14/24  0251   HGB 7.7* 7.4* 7.1*    361 357        Endo:   #IDDM  Initially presented to OSH with DKA with latest A1C 10.4 AG Gap resolved  Placed on insulin gtt 2/3 and dced 2/12.    - q4h BG monitoring while NPO  - Detemir 12 units daily  - Novolog 6units q4h while on TFs  - Moderate dose SSI PRN  - Endocrine following, appreciate recs      PPx:   Feeding: NPO. TF  Analgesia/Sedation: See above  Thromboembolic prevention: SQH   HOB >30: Y  Stress Ulcer ppx: Famotidine  Glucose control: Goal 140-180 g/dl, kermit detemir and novolog, SSI, Endo following  Bowel reg: psyllium  Invasive Lines/Drains/Airway: Glynn, ETT, LIJ Trialysis, PIV x2, Rectal tube      Dispo/Code Status/Palliative:   -- SICU / Full Code         Robinson Rousseau MD  Critical Care - Surgery  [facility] - Surgical Intensive Care

## 2024-02-14 NOTE — ASSESSMENT & PLAN NOTE
Neuro/Psych:   #Acute Encephalopathy  CTH 2/3 with scattered hypoattenuation likely representing age indeterminate infarcts. EEG findings consistent with moderate-severe encephalopathy. MRI 2/6: Several scattered punctate acute infarcts throughout the bilateral frontal lobes, centrum semiovale, basal ganglia, corpus callosum, and cerebellar hemispheres.  CTA 2/6: Atherosclerotic plaquing of the carotid bifurcations and proximal ICAs with less than 50% proximal ICA stenosis by NASCET criteria . Repeat CTH and MRI/MRA unchanged from prior exams..    Neuro/Psych  Repeat CTH and MRI/MRA stable from initial reads.   -- Sedation: None  -- Pain: kermit tylenol, dialudid and oxy prn  -- GCS 7T: E2, V1T, M4; exam stable  -- Neurology following; appreciate recs  -- Neurovascular following; appreciate recs  -- Palliative following; GOC conversation 2/7; appreciate recs  -- LP today.             Cards:   -- HDS  -- goal SBP < 180, MAP >65  -- hypertensive, hydralazine and labetalol prns  -- Continue amlodipine 10mg daily; coreg 25mg BID      Pulm:   #Acute Respiratory Failure  -- Intubated on spontaneous , Pressure support.  -- Goal O2 sat > 90%      Renal:  #ALVARADO on CKD  #ATN  Received HD 2/9. 2/13 LIJ trialysis  -- Nephrology following; appreciate recs  -- RRT as needed    Recent Labs   Lab 02/13/24  1346 02/13/24  2032 02/14/24  0251   BUN 19  19 23* 27*   CREATININE 2.0*  2.0* 2.5* 2.7*        FEN / GI:   -- Daily CMPs  -- NGT in place   -- Replace lytes as needed  -- Nutrition: NPO, TF held. Will restart (Novasource Renal) at goal 30 ml/hr after OR  -- GI PPX   -- Nutrition following; appreciate recs  -- Continue psyllium      ID:    #Ayaz's gangrene  s/p wound vac exchange/ debridement x4 for nec fascitis. R groin tissue with proteus, sensitive to ceftriaxone. Growing bacteroids as well.   -- Abx: ceftriaxone and flagyl EOT 2/22  -- ID following ; appreciate recs  -- Febrile overnight. White count down trending. Plan  to obtain blood culture pending discussion with staff.    Recent Labs   Lab 02/13/24  0301 02/13/24 2032 02/14/24  0251   WBC 13.13* 14.64* 13.67*        Heme/Onc:  -- Daily CBC  -- Heparin DVT ppx    Recent Labs   Lab 02/13/24  0301 02/13/24 2032 02/14/24  0251   HGB 7.7* 7.4* 7.1*    361 357        Endo:   #IDDM  Initially presented to OSH with DKA with latest A1C 10.4 AG Gap resolved  Placed on insulin gtt 2/3 and dced 2/12.    - q4h BG monitoring while NPO  - Detemir 12 units daily  - Novolog 6units q4h while on TFs  - Moderate dose SSI PRN  - Endocrine following, appreciate recs      PPx:   Feeding: NPO  Analgesia/Sedation: See above  Thromboembolic prevention: SQH   HOB >30: Y  Stress Ulcer ppx: Famotidine  Glucose control: Goal 140-180 g/dl, kermit detemir and novolog, SSI, Endo following  Bowel reg: psyllium  Invasive Lines/Drains/Airway: Glynn, ETT, LIJ Trialysis, PIV x2, Rectal tube      Dispo/Code Status/Palliative:   -- SICU / Full Code

## 2024-02-14 NOTE — PROGRESS NOTES
[facility]  Nephrology  Progress Note    Patient Name: Sarah Saravia  MRN: [id number]  Admission Date: 1/29/2024  Hospital Length of Stay: 16 days  Attending Provider: Steve Cole MD   Primary Care Physician: [facility]  Principal Problem:Ayaz's gangrene    Subjective:     HPI: Sarah Saravia is a 53 year old female with a past medical history of obesity (BMI 53) admitted with N/V and R groin wound found to be in DKA at OSH.  She underwent a CT abdomen and pelvis that showed extensive soft tissue air throughout the right groin and inguinal region and extending to involve the right lower quadrant anterior abdominal wall with extensive soft tissue air also seen involving the proximal medial aspect of the right thigh, concerning for gas-forming infection. She is s/p OR debridement for necrotizing fascitis on 1/29/24 and take back for 1/31/24. Right groin tissue growing proteus, susceptibilities still pending. Currently on meropenem and clindamycin which was d/c'd on 1/31/24. While at OSH pt developed acute respiratory failure requiring intubation. Nephrology was consulted for worsening alvarado in the setting of lactic acidosis and septic shock likely ATN, sCr elevated at 3.8 on admit.  OR today for debridement with possible closure and wound vac placement. Last HD 2/1. Net -1.3L. Electrolytes stable. Nephrology consulted for ALVARADO.     Interval History: no acute events overnight.  overnight    Review of patient's allergies indicates:  No Known Allergies  Current Facility-Administered Medications   Medication Frequency    0.9%  NaCl infusion (for blood administration) Q24H PRN    0.9%  NaCl infusion PRN    0.9%  NaCl infusion Once    acetaminophen tablet 650 mg Q4H PRN    albuterol-ipratropium 2.5 mg-0.5 mg/3 mL nebulizer solution 3 mL Q4H PRN    amLODIPine tablet 10 mg Daily    carvediloL tablet 25 mg BID    ceFEPIme (MAXIPIME) 2 g in dextrose 5 % in water  (D5W) 100 mL IVPB (MB+) Q12H    dextrose 10 % infusion Continuous PRN    dextrose 10% bolus 125 mL 125 mL PRN    dextrose 10% bolus 250 mL 250 mL PRN    famotidine tablet 20 mg Daily    glucagon (human recombinant) injection 1 mg PRN    glucose chewable tablet 16 g PRN    glucose chewable tablet 24 g PRN    heparin (porcine) injection 7,500 Units Q8H    hydrALAZINE injection 10 mg Q4H PRN    insulin aspart U-100 pen 0-10 Units Q4H PRN    insulin aspart U-100 pen 6 Units Q4H    insulin detemir U-100 (Levemir) pen 14 Units Daily    labetalol 20 mg/4 mL (5 mg/mL) IV syring Q6H PRN    metronidazole IVPB 500 mg Q8H    naloxone 0.4 mg/mL injection 0.02 mg PRN    ondansetron injection 4 mg Q6H PRN    oxyCODONE 5 mg/5 mL solution 10 mg Q6H PRN    oxyCODONE 5 mg/5 mL solution 5 mg Q6H PRN    prochlorperazine injection Soln 5 mg Q6H PRN    psyllium husk (aspartame) 3.4 gram packet 1 packet BID    sevelamer carbonate pwpk 1.6 g TID WM    sodium chloride 0.9% bolus 250 mL 250 mL PRN    sodium chloride 0.9% bolus 250 mL 250 mL PRN    sodium chloride 0.9% flush 10 mL PRN       Objective:     Vital Signs (Most Recent):  Temp: 98.8 °F (37.1 °C) (02/14/24 0300)  Pulse: 94 (02/14/24 0747)  Resp: (!) 27 (02/14/24 0747)  BP: (!) 142/68 (02/14/24 0747)  SpO2: 99 % (02/14/24 0747) Vital Signs (24h Range):  Temp:  [98.6 °F (37 °C)-102.4 °F (39.1 °C)] 98.8 °F (37.1 °C)  Pulse:  [] 94  Resp:  [23-39] 27  SpO2:  [98 %-100 %] 99 %  BP: (117-176)/(56-78) 142/68     Weight: (!) 153.3 kg (338 lb) (02/08/24 0300)  Body mass index is 56.25 kg/m².  Body surface area is 2.65 meters squared.    I/O last 3 completed shifts:  In: 3060.9 [I.V.:331.8; NG/GT:1000; IV Piggyback:1729]  Out: 5097 [Urine:830; Other:3817; Stool:450]     Physical Exam  Vitals and nursing note reviewed.   Constitutional:       General: She is not in acute distress.     Appearance: She is obese. She is not ill-appearing or toxic-appearing.      Interventions: She is sedated  and intubated.   Eyes:      General: No scleral icterus.        Right eye: No discharge.         Left eye: No discharge.   Cardiovascular:      Rate and Rhythm: Normal rate.   Pulmonary:      Effort: No respiratory distress. She is intubated.      Comments:       Abdominal:      General: There is distension.   Musculoskeletal:      Right lower leg: Edema present.      Left lower leg: Edema present.          Significant Labs:  All labs within the past 24 hours have been reviewed.     Significant Imaging:  Labs: Reviewed  Assessment/Plan:     Renal/  ALVARADO (acute kidney injury)  Transfer from [facility]. Admitted for fourniers gangrene. Initiated HD on 1/31. ALVARADO 2/2 ATN in setting of septic shock. Arrived with CR 3.8. Unknown baseline.  Plan to OR today. Net -1.3L.OR today for debridement with possible closure and wound vac placement     ALVARADO most likley ATN in setting of septic shock     Plan/Recommendation  -lasix challenge with 200 mg IV and monitor response, if inadequate response - will place on nocturnal SLED  -Daily RFP   -Strict I&Os  -Avoid nephrotoxins, if possible         Thank you for your consult. I will follow-up with patient. Please contact us if you have any additional questions.    Enrique Stokes MD  Nephrology  [facility]

## 2024-02-14 NOTE — SUBJECTIVE & OBJECTIVE
Interval History/Significant Events: Pt seen and examined at bedside. Febrile overnight. Improved with tylenol and cooling measures. Intermittent mouth movement to rigorous or lower extremities stimulation.       Follow-up For: Procedure(s) (LRB):  REPLACEMENT, WOUND VAC (Right)  R thigh wound debridement (Right)    Post-Operative Day: 2 Days Post-Op    Objective:     Vital Signs (Most Recent):  Temp: 98.8 °F (37.1 °C) (02/14/24 0300)  Pulse: 94 (02/14/24 0747)  Resp: (!) 27 (02/14/24 0747)  BP: (!) 142/68 (02/14/24 0747)  SpO2: 99 % (02/14/24 0747) Vital Signs (24h Range):  Temp:  [98.6 °F (37 °C)-102.4 °F (39.1 °C)] 98.8 °F (37.1 °C)  Pulse:  [] 94  Resp:  [23-39] 27  SpO2:  [98 %-100 %] 99 %  BP: (117-179)/(56-78) 142/68     Weight: (!) 153.3 kg (338 lb)  Body mass index is 56.25 kg/m².      Intake/Output Summary (Last 24 hours) at 2/14/2024 0756  Last data filed at 2/14/2024 0600  Gross per 24 hour   Intake 843.2 ml   Output 830 ml   Net 13.2 ml          Physical Exam      Appearance: She is obese.   HENT:      Head: Normocephalic.      Mouth/Throat:      Mouth: Mucous membranes are moist.   Eyes:      Comments: PERRL, no gaze  Neck:      Comments: LIJ Trialysis  Cardiovascular:      Rate and Rhythm: Normal rate and regular rhythm.   Pulmonary:      Effort: Pulmonary effort is normal.      Comments: Intubated  Abdominal:      General: Abdomen is flat.      Palpations: Abdomen is soft.   Genitourinary:     Comments: R groin wound   Glynn in place  Rectal tube  Musculoskeletal:       BLE nonedematous   Skin:     General: Skin is warm.   Neurological:      Comments: intermittent mouth movement to rigorous or LE stimulation. No spontaneous eyes opening.     Vents:  Vent Mode: Spont (02/14/24 0747)  Set Rate: 18 BPM (02/06/24 0349)  Vt Set: 420 mL (02/06/24 0349)  Pressure Support: 10 cmH20 (02/14/24 0747)  PEEP/CPAP: 5 cmH20 (02/14/24 0747)  Oxygen Concentration (%): 30 (02/14/24 0747)  Peak Airway Pressure:  15 cmH20 (02/14/24 0747)  Plateau Pressure: 15 cmH20 (02/14/24 0747)  Total Ve: 10.3 L/m (02/14/24 0747)  Negative Inspiratory Force (cm H2O): 0 (02/14/24 0747)  F/VT Ratio<105 (RSBI): (!) 61.64 (02/14/24 0747)    Lines/Drains/Airways       Central Venous Catheter Line  Duration             Trialysis (Dialysis) Catheter 02/11/24 2303 left internal jugular 2 days              Drain  Duration                  Rectal Tube 02/04/24 1545 9 days         NG/OG Tube 02/06/24 1030 Center mouth 7 days         Urethral Catheter 02/06/24 1900 7 days              Airway  Duration                  Airway - Non-Surgical 01/31/24 1020 13 days              Peripheral Intravenous Line  Duration                  Peripheral IV - Single Lumen 02/13/24 0548 18 G Left Antecubital 1 day         Peripheral IV - Single Lumen 02/13/24 1132 18 G;1 3/4 in Right Forearm <1 day                    Significant Labs:    CBC/Anemia Profile:  Recent Labs   Lab 02/13/24  0301 02/13/24 2032 02/14/24  0251   WBC 13.13* 14.64* 13.67*   HGB 7.7* 7.4* 7.1*   HCT 24.7* 23.3* 22.4*    361 357   MCV 86 85 88   RDW 17.7* 18.5* 18.3*        Chemistries:  Recent Labs   Lab 02/13/24  0301 02/13/24  1103 02/13/24  1346 02/13/24 2032 02/14/24  0251   *  --  133*  133* 134*  --    K 4.4  --  4.5  4.5 4.8  --      --  104  104 104 105   CO2 24  --  23  23 22* 22*   BUN 15  --  19  19 23* 27*   CREATININE 1.1  --  2.0*  2.0* 2.5* 2.7*   CALCIUM 8.0*  --  8.1*  8.1* 8.3*  --    ALBUMIN 1.8*  --  1.8*  1.8* 1.8* 1.8*   PROT 7.1  --   --   --   --    BILITOT 0.3  --   --   --   --    ALKPHOS 130  --   --   --  106   ALT 14  --   --   --  9*   AST 25  --   --   --  26   MG 2.0  --  2.1  2.1  --  2.0   PHOS 2.1* 4.7* 4.7*  4.7* 4.9*  --          Significant Imaging:  I have reviewed all pertinent imaging results/findings within the past 24 hours.

## 2024-02-14 NOTE — ASSESSMENT & PLAN NOTE
Transfer from [facility]. Admitted for fourniers gangrene. Initiated HD on 1/31. ALVARADO 2/2 ATN in setting of septic shock. Arrived with CR 3.8. Unknown baseline.  Plan to OR today. Net -1.3L.OR today for debridement with possible closure and wound vac placement     ALVARADO most likley ATN in setting of septic shock     Plan/Recommendation  -lasix challenge with 200 mg IV and monitor response, if inadequate response - will place on nocturnal SLED  -Daily RFP   -Strict I&Os  -Avoid nephrotoxins, if possible

## 2024-02-14 NOTE — ANESTHESIA POSTPROCEDURE EVALUATION
Anesthesia Post Evaluation    Patient: Sarah Saravia    Procedure(s) Performed: Procedure(s) (LRB):  REPLACEMENT, WOUND VAC (Right)  R thigh wound debridement (Right)    Final Anesthesia Type: general      Patient location during evaluation: PACU  Patient participation: No - Unable to Participate, Intubation  Level of consciousness: awake and alert  Post-procedure vital signs: reviewed and stable  Pain management: adequate  Airway patency: patent    PONV status: None or treated.  Anesthetic complications: no      Cardiovascular status: hemodynamically stable  Respiratory status: ventilator and intubated  Hydration status: euvolemic  Follow-up not needed.          Vitals Value Taken Time   /69 02/14/24 0902   Temp 37.1 °C (98.8 °F) 02/14/24 0300   Pulse 92 02/14/24 0912   Resp 25 02/14/24 0912   SpO2 100 % 02/14/24 0912   Vitals shown include unvalidated device data.      No case tracking events are documented in the log.      Pain/Lucía Score: Pain Rating Prior to Med Admin: 0 (2/13/2024  9:02 PM)

## 2024-02-14 NOTE — PROGRESS NOTES
Pharmacist Renal Dose Adjustment Note    Sarah Saravia is a 53 y.o. female being treated with cefepime    Patient Data:    Recent Labs   Lab 02/13/24  0301 02/13/24  1346 02/13/24 2032   CREATININE 1.1 2.0*  2.0* 2.5*     Serum creatinine: 2.5 mg/dL (H) 02/13/24 2032  Estimated creatinine clearance: 39.2 mL/min (A)    Cefepime 2g q24h is being changed to q12h in the setting of CrCl of 39.2 mL/min.    Pharmacist's Name: Larissa Rodas  Pharmacist's Extension: [phone]

## 2024-02-14 NOTE — PLAN OF CARE
SICU PLAN OF CARE NOTE    Dx: Ayaz's gangrene    Goals of Care: SBP <170, MAP >65    Vital Signs (last 12 hours):   Temp:  [98.8 °F (37.1 °C)-102.4 °F (39.1 °C)]   Pulse:  []   Resp:  [23-39]   BP: (117-176)/(56-78)   SpO2:  [98 %-100 %]      Neuro: Unresponsive    Cardiac: NSR    Respiratory: Ventilator    Urine Output: Urinary Catheter 170 cc/shift    Drains: Wound Vac, total output 250 / shift    Diet: NPO and Tube Feeds     Labs/Accuchecks: Accuchecks Q4    Skin:  Wounds and incisions per documentation.    Shift Events:  NAEON. VSS. CRRT held tonight. UOP minimal. Neuro status unchanged.

## 2024-02-14 NOTE — PROGRESS NOTES
[facility]  General Surgery  Progress Note    Subjective:     History of Present Illness:  53 year old morbidly obese female who does not routinely go to the doctor presents to the ED with malaise, nausea, vomiting, and groin, buttock, perineal swelling and tenderness. She began feeling ill a few days ago.     On arrival to the ED she is tachycardic to 120, hypertensive without fever. Labs demonstrate leukocytosis of 21, , with lactic acidosis and associated ALVARADO with cr 3.8, hyperglycemia with blood glucose of 824 accompanied by severe electrolyte derangements. CT imaging obtained demonstrates diffuse subcutaneous air along buttocks, perineum, anterior vulva, as well as bilateral thighs.     No prior abdominal surgeries. She denies problems with her heart or lungs. Non smoker. Does not routinely take any medications.    Post-Op Info:  Procedure(s) (LRB):  REPLACEMENT, WOUND VAC (Right)  R thigh wound debridement (Right)   2 Days Post-Op     Interval History: Patient febrile overnight to 102. Scheduled tylenol added and abx broadened. Stable on spontaneous. LP today. TF held since midnight.    Medications:  Continuous Infusions:   dextrose 10 % in water (D10W)       Scheduled Meds:   sodium chloride 0.9%   Intravenous Once    amLODIPine  10 mg Per OG tube Daily    carvediloL  25 mg Per OG tube BID    ceFEPime IV (PEDS and ADULTS)  2 g Intravenous Q12H    famotidine  20 mg Per OG tube Daily    heparin (porcine)  7,500 Units Subcutaneous Q8H    insulin aspart U-100  6 Units Subcutaneous Q4H    insulin detemir U-100  14 Units Subcutaneous Daily    metronidazole  500 mg Intravenous Q8H    psyllium husk (aspartame)  1 packet Per OG tube BID    sevelamer carbonate  1.6 g Per OG tube TID WM     PRN Meds:0.9%  NaCl infusion (for blood administration), sodium chloride 0.9%, acetaminophen, albuterol-ipratropium, dextrose 10 % in water (D10W), dextrose 10%, dextrose 10%, glucagon (human  recombinant), glucose, glucose, hydrALAZINE, insulin aspart U-100, labetalol, naloxone, ondansetron, oxyCODONE, oxyCODONE, prochlorperazine, sodium chloride 0.9%, sodium chloride 0.9%, sodium chloride 0.9%     Review of patient's allergies indicates:  No Known Allergies  Objective:     Vital Signs (Most Recent):  Temp: 98.8 °F (37.1 °C) (02/14/24 0300)  Pulse: 94 (02/14/24 0747)  Resp: (!) 27 (02/14/24 0747)  BP: (!) 142/68 (02/14/24 0747)  SpO2: 99 % (02/14/24 0747) Vital Signs (24h Range):  Temp:  [98.6 °F (37 °C)-102.4 °F (39.1 °C)] 98.8 °F (37.1 °C)  Pulse:  [] 94  Resp:  [23-39] 27  SpO2:  [98 %-100 %] 99 %  BP: (117-176)/(56-78) 142/68     Weight: (!) 153.3 kg (338 lb)  Body mass index is 56.25 kg/m².    Intake/Output - Last 3 Shifts         02/12 0700  02/13 0659 02/13 0700 02/14 0659 02/14 0700  02/15 0659    I.V. (mL/kg) 331.8 (2.2)      NG/ 640     IV Piggyback 1454.3 374.8     Total Intake(mL/kg) 2296.1 (15) 1014.8 (6.6)     Urine (mL/kg/hr) 660 (0.2) 335 (0.1)     Other 3467 450     Stool 600 50     Total Output 4727 835     Net -2430.9 +179.8            Stool Occurrence 2 x               Physical Exam  Vitals and nursing note reviewed.   Constitutional:       General: She is not in acute distress.     Appearance: She is ill-appearing.   HENT:      Head: Normocephalic and atraumatic.   Eyes:      Extraocular Movements: Extraocular movements intact.      Conjunctiva/sclera: Conjunctivae normal.   Neck:      Comments: Left IJ Trialysis catheter  Cardiovascular:      Rate and Rhythm: Normal rate and regular rhythm.   Pulmonary:      Effort: Pulmonary effort is normal.      Comments: Vent Mode: Spont  Oxygen Concentration (%):  [40] 40  Resp Rate Total:  [19 br/min-30 br/min] 21 br/min  PEEP/CPAP:  [5 cmH20] 5 cmH20  Pressure Support:  [10 cmH20] 10 cmH20  Mean Airway Pressure:  [8.6 cmH20-9.2 cmH20] 8.6 cmH20     Abdominal:      General: There is no distension.      Palpations: Abdomen is soft.       Tenderness: There is no abdominal tenderness.   Genitourinary:     Comments: Glynn in place  Rectal Tube  Musculoskeletal:      Cervical back: Normal range of motion.   Skin:     General: Skin is warm and dry.      Comments: Wound vac in place to R thigh/groin to suction   Neurological:      General: No focal deficit present.      Mental Status: She is oriented to person, place, and time.   Psychiatric:         Mood and Affect: Mood normal.         Behavior: Behavior normal.          Significant Labs:  I have reviewed all pertinent lab results within the past 24 hours.  CBC:   Recent Labs   Lab 02/14/24  0251   WBC 13.67*   RBC 2.56*   HGB 7.1*   HCT 22.4*      MCV 88   MCH 27.7   MCHC 31.7*     BMP:   Recent Labs   Lab 02/14/24  0251   *   *   K 4.9      CO2 22*   BUN 27*   CREATININE 2.7*   CALCIUM 8.2*   MG 2.0       Significant Diagnostics:  I have reviewed all pertinent imaging results/findings within the past 24 hours.  Assessment/Plan:     * Ayaz's gangrene  53 y.o. 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     - Last [place] change 2/12  - OR tomorrow for vac change, will discuss plans for trach/PEG/permacath/stoma in the setting of new fevers  - LP today - FUP results   - Palliative following given overall poor prognosis, daughter would like everything done; MRI slightly improved but no changes to patient's neuro status  - Daily SBTs  - Okay for DVT ppx   - Remainder of care per SICU        Celia Allison MD  General Surgery  [facility]

## 2024-02-14 NOTE — CARE UPDATE
Patient's chart was reviewed by a stroke provider. Case was discussed with staff.      52 yo female with PMHx obesity currently admitted to SICU for necrotizing fascitis s/p surgical debridement with encephalopathy post procedure. MRI Brain was obtained which showed multifocal areas of restricted diffusion in bilateral hemispheres in multiple vascular territories with additional L temporal focus with vasogenic edema. MRV negative. Repeat MRI showed left temporal lesion to be improving and be less prominent. EEG negative for seizure activity. TTE without evidence of vegetations and JAY had been recommended however cards did not feel it would change prognosis.     Patient remains intubated with poor prognosis and palliative care is following for ongoing GOC discussion. Per palliative note 2/12, family wishes to continue full supportive care.    Plan for LP tomorrow (2/15) with anesthesia. Gen surg following, plan for OR tomorrow (2/15) for vac change. Nephro following for HD. Ongoing management per primary team. No new recs from stroke standpoint at this time. Please contact stroke team with any questions/concerns.

## 2024-02-14 NOTE — ASSESSMENT & PLAN NOTE
53 y.o. 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     - Last [place] change 2/12  - OR tomorrow for vac change, will discuss plans for trach/PEG/permacath/stoma in the setting of new fevers  - LP today - FUP results   - Palliative following given overall poor prognosis, daughter would like everything done; MRI slightly improved but no changes to patient's neuro status  - Daily SBTs  - Okay for DVT ppx   - Remainder of care per SICU

## 2024-02-14 NOTE — PLAN OF CARE
"      SICU PLAN OF CARE NOTE    Dx: Ayaz's gangrene    Shift Events: Decrease in patient's GCS, primary team aware. VSS throughout shift. Plan to hold off on CRRT tonight. Lumbar puncture scheduled for tomorrow.     Goals of Care: MAP > 65, SBP < 170    Neuro: opens eyes, does not follow commands    Vital Signs: BP (!) 176/74   Pulse 103   Temp 99 °F (37.2 °C) (Oral)   Resp (!) 33   Ht 5' 5" (1.651 m)   Wt (!) 153.3 kg (338 lb)   LMP 01/01/2024 (Approximate) Comment: tubal ligation  SpO2 100%   BMI 56.25 kg/m²     Respiratory: Ventilator    Diet: Tube Feeds      Urine Output: Urinary Catheter 135 cc/shift    Drains:     Wound vac 150 cc shift      Labs/Accuchecks: Daily labs, q4hr accucheck.    Skin: Heel boots on and inflated. Specialty bed plugged in. No evidence of skin breakdown throughout shift. Wound vac seal intact.       "

## 2024-02-14 NOTE — SUBJECTIVE & OBJECTIVE
Interval History: Patient febrile overnight to 102. Scheduled tylenol added and abx broadened. Stable on spontaneous. LP today. TF held since midnight.    Medications:  Continuous Infusions:   dextrose 10 % in water (D10W)       Scheduled Meds:   sodium chloride 0.9%   Intravenous Once    amLODIPine  10 mg Per OG tube Daily    carvediloL  25 mg Per OG tube BID    ceFEPime IV (PEDS and ADULTS)  2 g Intravenous Q12H    famotidine  20 mg Per OG tube Daily    heparin (porcine)  7,500 Units Subcutaneous Q8H    insulin aspart U-100  6 Units Subcutaneous Q4H    insulin detemir U-100  14 Units Subcutaneous Daily    metronidazole  500 mg Intravenous Q8H    psyllium husk (aspartame)  1 packet Per OG tube BID    sevelamer carbonate  1.6 g Per OG tube TID WM     PRN Meds:0.9%  NaCl infusion (for blood administration), sodium chloride 0.9%, acetaminophen, albuterol-ipratropium, dextrose 10 % in water (D10W), dextrose 10%, dextrose 10%, glucagon (human recombinant), glucose, glucose, hydrALAZINE, insulin aspart U-100, labetalol, naloxone, ondansetron, oxyCODONE, oxyCODONE, prochlorperazine, sodium chloride 0.9%, sodium chloride 0.9%, sodium chloride 0.9%     Review of patient's allergies indicates:  No Known Allergies  Objective:     Vital Signs (Most Recent):  Temp: 98.8 °F (37.1 °C) (02/14/24 0300)  Pulse: 94 (02/14/24 0747)  Resp: (!) 27 (02/14/24 0747)  BP: (!) 142/68 (02/14/24 0747)  SpO2: 99 % (02/14/24 0747) Vital Signs (24h Range):  Temp:  [98.6 °F (37 °C)-102.4 °F (39.1 °C)] 98.8 °F (37.1 °C)  Pulse:  [] 94  Resp:  [23-39] 27  SpO2:  [98 %-100 %] 99 %  BP: (117-176)/(56-78) 142/68     Weight: (!) 153.3 kg (338 lb)  Body mass index is 56.25 kg/m².    Intake/Output - Last 3 Shifts         02/12 0700  02/13 0659 02/13 0700 02/14 0659 02/14 0700  02/15 0659    I.V. (mL/kg) 331.8 (2.2)      NG/ 640     IV Piggyback 1454.3 374.8     Total Intake(mL/kg) 2296.1 (15) 1014.8 (6.6)     Urine (mL/kg/hr) 660 (0.2) 335  (0.1)     Other 3467 450     Stool 600 50     Total Output 4727 835     Net -2430.9 +179.8            Stool Occurrence 2 x               Physical Exam  Vitals and nursing note reviewed.   Constitutional:       General: She is not in acute distress.     Appearance: She is ill-appearing.   HENT:      Head: Normocephalic and atraumatic.   Eyes:      Extraocular Movements: Extraocular movements intact.      Conjunctiva/sclera: Conjunctivae normal.   Neck:      Comments: Left IJ Trialysis catheter  Cardiovascular:      Rate and Rhythm: Normal rate and regular rhythm.   Pulmonary:      Effort: Pulmonary effort is normal.      Comments: Vent Mode: Spont  Oxygen Concentration (%):  [40] 40  Resp Rate Total:  [19 br/min-30 br/min] 21 br/min  PEEP/CPAP:  [5 cmH20] 5 cmH20  Pressure Support:  [10 cmH20] 10 cmH20  Mean Airway Pressure:  [8.6 cmH20-9.2 cmH20] 8.6 cmH20     Abdominal:      General: There is no distension.      Palpations: Abdomen is soft.      Tenderness: There is no abdominal tenderness.   Genitourinary:     Comments: Glynn in place  Rectal Tube  Musculoskeletal:      Cervical back: Normal range of motion.   Skin:     General: Skin is warm and dry.      Comments: Wound vac in place to R thigh/groin to suction   Neurological:      General: No focal deficit present.      Mental Status: She is oriented to person, place, and time.   Psychiatric:         Mood and Affect: Mood normal.         Behavior: Behavior normal.          Significant Labs:  I have reviewed all pertinent lab results within the past 24 hours.  CBC:   Recent Labs   Lab 02/14/24  0251   WBC 13.67*   RBC 2.56*   HGB 7.1*   HCT 22.4*      MCV 88   MCH 27.7   MCHC 31.7*     BMP:   Recent Labs   Lab 02/14/24  0251   *   *   K 4.9      CO2 22*   BUN 27*   CREATININE 2.7*   CALCIUM 8.2*   MG 2.0       Significant Diagnostics:  I have reviewed all pertinent imaging results/findings within the past 24 hours.

## 2024-02-14 NOTE — PROGRESS NOTES
"[facility]  Endocrinology  Progress Note    Admit Date: 1/29/2024     Reason for Consult: Management of T2DM, Hyperglycemia     Surgical Procedure and Date: n/a    Diabetes diagnosis year: new onset     Home Diabetes Medications:  none per chart review and family present at bedside     Lab Results   Component Value Date    HGBA1C 10.4 (H) 01/30/2024       Diabetes Complications include:     Hyperglycemia, DKA at OSH     Complicating diabetes co morbidities:   Obesity       HPI:   Patient is a 53 y.o. female with a diagnosis of obesity (BMI 53) admitted with N/V and R groin wound found to be in DKA at OSH. 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 While at OSH pt developed acute respiratory failure requiring intubation. Pulmonology was consulted for ventilator management and critical illness. Nephrology was consulted for worsening vishal in the setting of lactic acidosis and septic shock likely ATN, sCr elevated at 3.8 on admit now 4.0 post HD. Pt being admitted to SICU for surgical critical care management. Immediate plans include hemodynamic stabilization, weaning of respiratory support, and RRT.  Endocrinology consulted for management of T2DM.                 Interval HPI:   S/p wound debridement and wound vac exchange . Patient in room 31116/68053 A. Blood glucose stable. BG at or above goal on current insulin regimen (scheduled insulin). Steroid use- None .   Renal function- Abnormal -   Vasopressors-  None      Diet NPO      Eating:   NPO  Nausea: No  Hypoglycemia and intervention: No  Fever: No  TPN and/or TF: Yes 30 cc/hr (on hold for procedure)    BP (!) 142/68   Pulse 94   Temp 98.8 °F (37.1 °C) (Oral)   Resp (!) 27   Ht 5' 5" (1.651 m)   Wt (!) 153.3 kg (338 lb)   LMP 01/01/2024 (Approximate) Comment: tubal ligation  SpO2 99%   BMI 56.25 kg/m²     Labs Reviewed and Include  " "  Recent Labs   Lab 02/14/24  0251   *   CALCIUM 8.2*   ALBUMIN 1.8*   PROT 6.7   *   K 4.9   CO2 22*      BUN 27*   CREATININE 2.7*   ALKPHOS 106   ALT 9*   AST 26   BILITOT 0.2     Lab Results   Component Value Date    WBC 13.67 (H) 02/14/2024    HGB 7.1 (L) 02/14/2024    HCT 22.4 (L) 02/14/2024    MCV 88 02/14/2024     02/14/2024     No results for input(s): "TSH", "FREET4" in the last 168 hours.  Lab Results   Component Value Date    HGBA1C 10.4 (H) 01/30/2024       Nutritional status:   Body mass index is 56.25 kg/m².  Lab Results   Component Value Date    ALBUMIN 1.8 (L) 02/14/2024    ALBUMIN 1.8 (L) 02/13/2024    ALBUMIN 1.8 (L) 02/13/2024    ALBUMIN 1.8 (L) 02/13/2024     No results found for: "PREALBUMIN"    Estimated Creatinine Clearance: 36.3 mL/min (A) (based on SCr of 2.7 mg/dL (H)).    Accu-Checks  Recent Labs     02/13/24  0039 02/13/24  0301 02/13/24  0748 02/13/24  1210 02/13/24  1552 02/13/24  1910 02/13/24  2327 02/13/24  2328 02/14/24  0244 02/14/24  0738   POCTGLUCOSE 158* 158* 198* 206* 173* 183* 220* 183* 182* 164*       Current Medications and/or Treatments Impacting Glycemic Control  Immunotherapy:    Immunosuppressants       None          Steroids:   Hormones (From admission, onward)      None          Pressors:    Autonomic Drugs (From admission, onward)      None          Hyperglycemia/Diabetes Medications:   Antihyperglycemics (From admission, onward)      Start     Stop Route Frequency Ordered    02/13/24 0915  insulin detemir U-100 (Levemir) pen 14 Units         -- SubQ Daily 02/13/24 0906    02/09/24 1200  insulin aspart U-100 pen 6 Units         -- SubQ Every 4 hours 02/09/24 0901    02/03/24 1010  insulin aspart U-100 pen 0-10 Units         -- SubQ Every 4 hours PRN 02/03/24 0911            ASSESSMENT and PLAN    Renal/  * Ayaz's gangrene  Managed per primary team  Optimize BG control        ALVARADO (acute kidney injury)  Lab Results   Component Value " Date    CREATININE 2.7 (H) 02/14/2024     Avoid insulin stacking  Titrate insulin slowly       Endocrine  New onset type 2 diabetes mellitus  BG goal 140-180  No previous hx of T2DM per family at bedside. A1c of 10.4. DKA at OSH. TF held. BG stable on regimen.      Plan:  - Continue Levemir 14 units daily.  - Continue Novolog 6 units q 4 hrs while on tube feeding (HOLD if tube feeding is stopped or BG < 100)   - Continue Moderate Dose Correction Scale  - BG monitoring q 4 hrs while NPO /TF    ** Please call Endocrine for any BG related issues **      Discharge plans: TBD    Lab Results   Component Value Date    HGBA1C 10.4 (H) 01/30/2024             Salvador Alfaro PA-C  Endocrinology  [facility]

## 2024-02-14 NOTE — ASSESSMENT & PLAN NOTE
Lab Results   Component Value Date    CREATININE 2.7 (H) 02/14/2024     Avoid insulin stacking  Titrate insulin slowly

## 2024-02-15 ENCOUNTER — ANESTHESIA EVENT (OUTPATIENT)
Dept: ENDOSCOPY | Facility: HOSPITAL | Age: 54
DRG: 004 | End: 2024-02-15
Payer: MEDICAID

## 2024-02-15 ENCOUNTER — ANESTHESIA (OUTPATIENT)
Dept: ENDOSCOPY | Facility: HOSPITAL | Age: 54
DRG: 004 | End: 2024-02-15
Payer: MEDICAID

## 2024-02-15 ENCOUNTER — ANESTHESIA (OUTPATIENT)
Dept: SURGERY | Facility: HOSPITAL | Age: 54
DRG: 004 | End: 2024-02-15
Payer: MEDICAID

## 2024-02-15 LAB
ALBUMIN SERPL BCP-MCNC: 1.7 G/DL (ref 3.5–5.2)
ALBUMIN SERPL BCP-MCNC: 1.8 G/DL (ref 3.5–5.2)
ALBUMIN SERPL BCP-MCNC: 1.8 G/DL (ref 3.5–5.2)
ALP SERPL-CCNC: 108 U/L (ref 55–135)
ALT SERPL W/O P-5'-P-CCNC: 11 U/L (ref 10–44)
ANION GAP SERPL CALC-SCNC: 6 MMOL/L (ref 8–16)
ANION GAP SERPL CALC-SCNC: 9 MMOL/L (ref 8–16)
ANION GAP SERPL CALC-SCNC: 9 MMOL/L (ref 8–16)
APTT PPP: 26.2 SEC (ref 21–32)
AST SERPL-CCNC: 27 U/L (ref 10–40)
BACTERIA UR CULT: NORMAL
BACTERIA UR CULT: NORMAL
BASOPHILS # BLD AUTO: 0.06 K/UL (ref 0–0.2)
BASOPHILS NFR BLD: 0.5 % (ref 0–1.9)
BILIRUB SERPL-MCNC: 0.2 MG/DL (ref 0.1–1)
BUN SERPL-MCNC: 25 MG/DL (ref 6–20)
BUN SERPL-MCNC: 25 MG/DL (ref 6–20)
BUN SERPL-MCNC: 27 MG/DL (ref 6–20)
CA-I BLDV-SCNC: 1.08 MMOL/L (ref 1.06–1.42)
CA-I BLDV-SCNC: 1.08 MMOL/L (ref 1.06–1.42)
CA-I BLDV-SCNC: 1.09 MMOL/L (ref 1.06–1.42)
CA-I BLDV-SCNC: 1.1 MMOL/L (ref 1.06–1.42)
CALCIUM SERPL-MCNC: 7.9 MG/DL (ref 8.7–10.5)
CALCIUM SERPL-MCNC: 8.3 MG/DL (ref 8.7–10.5)
CALCIUM SERPL-MCNC: 8.3 MG/DL (ref 8.7–10.5)
CHLORIDE SERPL-SCNC: 104 MMOL/L (ref 95–110)
CHLORIDE SERPL-SCNC: 104 MMOL/L (ref 95–110)
CHLORIDE SERPL-SCNC: 107 MMOL/L (ref 95–110)
CO2 SERPL-SCNC: 23 MMOL/L (ref 23–29)
COMMENT: NORMAL
CREAT SERPL-MCNC: 2.2 MG/DL (ref 0.5–1.4)
DIFFERENTIAL METHOD BLD: ABNORMAL
EOSINOPHIL # BLD AUTO: 0.4 K/UL (ref 0–0.5)
EOSINOPHIL NFR BLD: 3.5 % (ref 0–8)
ERYTHROCYTE [DISTWIDTH] IN BLOOD BY AUTOMATED COUNT: 18.1 % (ref 11.5–14.5)
EST. GFR  (NO RACE VARIABLE): 26.2 ML/MIN/1.73 M^2
FINAL PATHOLOGIC DIAGNOSIS: NORMAL
GLUCOSE SERPL-MCNC: 123 MG/DL (ref 70–110)
GLUCOSE SERPL-MCNC: 131 MG/DL (ref 70–110)
GLUCOSE SERPL-MCNC: 131 MG/DL (ref 70–110)
GROSS: NORMAL
HCT VFR BLD AUTO: 22.9 % (ref 37–48.5)
HGB BLD-MCNC: 7.1 G/DL (ref 12–16)
IMM GRANULOCYTES # BLD AUTO: 0.07 K/UL (ref 0–0.04)
IMM GRANULOCYTES NFR BLD AUTO: 0.6 % (ref 0–0.5)
LYMPHOCYTES # BLD AUTO: 1.3 K/UL (ref 1–4.8)
LYMPHOCYTES NFR BLD: 11.4 % (ref 18–48)
Lab: NORMAL
MAGNESIUM SERPL-MCNC: 1.8 MG/DL (ref 1.6–2.6)
MAGNESIUM SERPL-MCNC: 2.4 MG/DL (ref 1.6–2.6)
MAGNESIUM SERPL-MCNC: 2.4 MG/DL (ref 1.6–2.6)
MCH RBC QN AUTO: 27.4 PG (ref 27–31)
MCHC RBC AUTO-ENTMCNC: 31 G/DL (ref 32–36)
MCV RBC AUTO: 88 FL (ref 82–98)
MICROSCOPIC EXAM: NORMAL
MONOCYTES # BLD AUTO: 1.1 K/UL (ref 0.3–1)
MONOCYTES NFR BLD: 9.7 % (ref 4–15)
NEUTROPHILS # BLD AUTO: 8.6 K/UL (ref 1.8–7.7)
NEUTROPHILS NFR BLD: 74.3 % (ref 38–73)
NRBC BLD-RTO: 0 /100 WBC
OHS QRS DURATION: 156 MS
OHS QTC CALCULATION: 466 MS
PHOSPHATE SERPL-MCNC: 4.1 MG/DL (ref 2.7–4.5)
PHOSPHATE SERPL-MCNC: 4.7 MG/DL (ref 2.7–4.5)
PHOSPHATE SERPL-MCNC: 4.7 MG/DL (ref 2.7–4.5)
PLATELET # BLD AUTO: 350 K/UL (ref 150–450)
PMV BLD AUTO: 9.3 FL (ref 9.2–12.9)
POCT GLUCOSE: 110 MG/DL (ref 70–110)
POCT GLUCOSE: 128 MG/DL (ref 70–110)
POCT GLUCOSE: 138 MG/DL (ref 70–110)
POCT GLUCOSE: 144 MG/DL (ref 70–110)
POCT GLUCOSE: 152 MG/DL (ref 70–110)
POTASSIUM SERPL-SCNC: 4.6 MMOL/L (ref 3.5–5.1)
POTASSIUM SERPL-SCNC: 5.2 MMOL/L (ref 3.5–5.1)
POTASSIUM SERPL-SCNC: 5.2 MMOL/L (ref 3.5–5.1)
PROT SERPL-MCNC: 6.9 G/DL (ref 6–8.4)
RBC # BLD AUTO: 2.59 M/UL (ref 4–5.4)
SODIUM SERPL-SCNC: 136 MMOL/L (ref 136–145)
SUPPLEMENTAL DIAGNOSIS: NORMAL
WBC # BLD AUTO: 11.6 K/UL (ref 3.9–12.7)

## 2024-02-15 PROCEDURE — 85025 COMPLETE CBC W/AUTO DIFF WBC: CPT

## 2024-02-15 PROCEDURE — 83735 ASSAY OF MAGNESIUM: CPT | Mod: 91 | Performed by: STUDENT IN AN ORGANIZED HEALTH CARE EDUCATION/TRAINING PROGRAM

## 2024-02-15 PROCEDURE — 25000003 PHARM REV CODE 250

## 2024-02-15 PROCEDURE — 36000705 HC OR TIME LEV I EA ADD 15 MIN: Performed by: STUDENT IN AN ORGANIZED HEALTH CARE EDUCATION/TRAINING PROGRAM

## 2024-02-15 PROCEDURE — 63600175 PHARM REV CODE 636 W HCPCS

## 2024-02-15 PROCEDURE — 84100 ASSAY OF PHOSPHORUS: CPT

## 2024-02-15 PROCEDURE — 99233 SBSQ HOSP IP/OBS HIGH 50: CPT | Mod: 95,,, | Performed by: STUDENT IN AN ORGANIZED HEALTH CARE EDUCATION/TRAINING PROGRAM

## 2024-02-15 PROCEDURE — 94003 VENT MGMT INPAT SUBQ DAY: CPT

## 2024-02-15 PROCEDURE — 99900026 HC AIRWAY MAINTENANCE (STAT)

## 2024-02-15 PROCEDURE — 99024 POSTOP FOLLOW-UP VISIT: CPT | Mod: ,,, | Performed by: STUDENT IN AN ORGANIZED HEALTH CARE EDUCATION/TRAINING PROGRAM

## 2024-02-15 PROCEDURE — 85730 THROMBOPLASTIN TIME PARTIAL: CPT

## 2024-02-15 PROCEDURE — 63600175 PHARM REV CODE 636 W HCPCS: Performed by: STUDENT IN AN ORGANIZED HEALTH CARE EDUCATION/TRAINING PROGRAM

## 2024-02-15 PROCEDURE — 82330 ASSAY OF CALCIUM: CPT | Mod: 91

## 2024-02-15 PROCEDURE — 82330 ASSAY OF CALCIUM: CPT | Mod: 91 | Performed by: STUDENT IN AN ORGANIZED HEALTH CARE EDUCATION/TRAINING PROGRAM

## 2024-02-15 PROCEDURE — 80053 COMPREHEN METABOLIC PANEL: CPT

## 2024-02-15 PROCEDURE — 94761 N-INVAS EAR/PLS OXIMETRY MLT: CPT

## 2024-02-15 PROCEDURE — 20000000 HC ICU ROOM

## 2024-02-15 PROCEDURE — 63600175 PHARM REV CODE 636 W HCPCS: Mod: JZ,JG

## 2024-02-15 PROCEDURE — 36000704 HC OR TIME LEV I 1ST 15 MIN: Performed by: STUDENT IN AN ORGANIZED HEALTH CARE EDUCATION/TRAINING PROGRAM

## 2024-02-15 PROCEDURE — 27201423 OPTIME MED/SURG SUP & DEVICES STERILE SUPPLY: Performed by: STUDENT IN AN ORGANIZED HEALTH CARE EDUCATION/TRAINING PROGRAM

## 2024-02-15 PROCEDURE — 25000003 PHARM REV CODE 250: Performed by: STUDENT IN AN ORGANIZED HEALTH CARE EDUCATION/TRAINING PROGRAM

## 2024-02-15 PROCEDURE — 99232 SBSQ HOSP IP/OBS MODERATE 35: CPT | Mod: ,,, | Performed by: NURSE PRACTITIONER

## 2024-02-15 PROCEDURE — D9220A PRA ANESTHESIA: Mod: ANES,,, | Performed by: ANESTHESIOLOGY

## 2024-02-15 PROCEDURE — 25000242 PHARM REV CODE 250 ALT 637 W/ HCPCS

## 2024-02-15 PROCEDURE — D9220A PRA ANESTHESIA: Mod: CRNA,,, | Performed by: NURSE ANESTHETIST, CERTIFIED REGISTERED

## 2024-02-15 PROCEDURE — 80069 RENAL FUNCTION PANEL: CPT | Performed by: STUDENT IN AN ORGANIZED HEALTH CARE EDUCATION/TRAINING PROGRAM

## 2024-02-15 PROCEDURE — 99900035 HC TECH TIME PER 15 MIN (STAT)

## 2024-02-15 PROCEDURE — 90945 DIALYSIS ONE EVALUATION: CPT | Mod: ,,, | Performed by: INTERNAL MEDICINE

## 2024-02-15 PROCEDURE — 63600175 PHARM REV CODE 636 W HCPCS: Performed by: NURSE ANESTHETIST, CERTIFIED REGISTERED

## 2024-02-15 PROCEDURE — 97597 DBRDMT OPN WND 1ST 20 CM/<: CPT | Mod: ,,, | Performed by: STUDENT IN AN ORGANIZED HEALTH CARE EDUCATION/TRAINING PROGRAM

## 2024-02-15 PROCEDURE — 37000008 HC ANESTHESIA 1ST 15 MINUTES: Performed by: STUDENT IN AN ORGANIZED HEALTH CARE EDUCATION/TRAINING PROGRAM

## 2024-02-15 PROCEDURE — 90945 DIALYSIS ONE EVALUATION: CPT

## 2024-02-15 PROCEDURE — 83735 ASSAY OF MAGNESIUM: CPT

## 2024-02-15 PROCEDURE — 25000003 PHARM REV CODE 250: Performed by: NURSE ANESTHETIST, CERTIFIED REGISTERED

## 2024-02-15 PROCEDURE — 37000009 HC ANESTHESIA EA ADD 15 MINS: Performed by: STUDENT IN AN ORGANIZED HEALTH CARE EDUCATION/TRAINING PROGRAM

## 2024-02-15 PROCEDURE — 27100171 HC OXYGEN HIGH FLOW UP TO 24 HOURS

## 2024-02-15 RX ORDER — CEFTRIAXONE 2 G/1
INJECTION, POWDER, FOR SOLUTION INTRAMUSCULAR; INTRAVENOUS
Status: DISCONTINUED | OUTPATIENT
Start: 2024-02-15 | End: 2024-02-15

## 2024-02-15 RX ORDER — VECURONIUM BROMIDE FOR INJECTION 1 MG/ML
INJECTION, POWDER, LYOPHILIZED, FOR SOLUTION INTRAVENOUS
Status: DISCONTINUED | OUTPATIENT
Start: 2024-02-15 | End: 2024-02-15

## 2024-02-15 RX ORDER — HEPARIN SODIUM 5000 [USP'U]/ML
7500 INJECTION, SOLUTION INTRAVENOUS; SUBCUTANEOUS ONCE
Status: COMPLETED | OUTPATIENT
Start: 2024-02-15 | End: 2024-02-15

## 2024-02-15 RX ORDER — FENTANYL CITRATE 50 UG/ML
INJECTION, SOLUTION INTRAMUSCULAR; INTRAVENOUS
Status: DISCONTINUED | OUTPATIENT
Start: 2024-02-15 | End: 2024-02-15

## 2024-02-15 RX ORDER — PHENYLEPHRINE HCL IN 0.9% NACL 1 MG/10 ML
SYRINGE (ML) INTRAVENOUS
Status: DISCONTINUED | OUTPATIENT
Start: 2024-02-15 | End: 2024-02-15

## 2024-02-15 RX ORDER — PHENYLEPHRINE HYDROCHLORIDE 10 MG/ML
INJECTION INTRAVENOUS
Status: DISCONTINUED | OUTPATIENT
Start: 2024-02-15 | End: 2024-02-15

## 2024-02-15 RX ORDER — ROCURONIUM BROMIDE 10 MG/ML
INJECTION, SOLUTION INTRAVENOUS
Status: DISCONTINUED | OUTPATIENT
Start: 2024-02-15 | End: 2024-02-15

## 2024-02-15 RX ORDER — MIDAZOLAM HYDROCHLORIDE 1 MG/ML
INJECTION, SOLUTION INTRAMUSCULAR; INTRAVENOUS
Status: DISCONTINUED | OUTPATIENT
Start: 2024-02-15 | End: 2024-02-15

## 2024-02-15 RX ORDER — FUROSEMIDE 10 MG/ML
80 INJECTION INTRAMUSCULAR; INTRAVENOUS EVERY 12 HOURS
Status: DISCONTINUED | OUTPATIENT
Start: 2024-02-15 | End: 2024-02-16

## 2024-02-15 RX ADMIN — AMLODIPINE BESYLATE 10 MG: 10 TABLET ORAL at 10:02

## 2024-02-15 RX ADMIN — INSULIN ASPART 1 UNITS: 100 INJECTION, SOLUTION INTRAVENOUS; SUBCUTANEOUS at 11:02

## 2024-02-15 RX ADMIN — FUROSEMIDE 80 MG: 10 INJECTION, SOLUTION INTRAVENOUS at 12:02

## 2024-02-15 RX ADMIN — INSULIN ASPART 6 UNITS: 100 INJECTION, SOLUTION INTRAVENOUS; SUBCUTANEOUS at 08:02

## 2024-02-15 RX ADMIN — PSYLLIUM HUSK 1 PACKET: 3.4 POWDER ORAL at 10:02

## 2024-02-15 RX ADMIN — SODIUM CHLORIDE, SODIUM GLUCONATE, SODIUM ACETATE, POTASSIUM CHLORIDE, MAGNESIUM CHLORIDE, SODIUM PHOSPHATE, DIBASIC, AND POTASSIUM PHOSPHATE: .53; .5; .37; .037; .03; .012; .00082 INJECTION, SOLUTION INTRAVENOUS at 07:02

## 2024-02-15 RX ADMIN — Medication 200 MCG: at 07:02

## 2024-02-15 RX ADMIN — PSYLLIUM HUSK 1 PACKET: 3.4 POWDER ORAL at 08:02

## 2024-02-15 RX ADMIN — INSULIN ASPART 6 UNITS: 100 INJECTION, SOLUTION INTRAVENOUS; SUBCUTANEOUS at 12:02

## 2024-02-15 RX ADMIN — CALCIUM GLUCONATE: 98 INJECTION, SOLUTION INTRAVENOUS at 03:02

## 2024-02-15 RX ADMIN — SUGAMMADEX 400 MG: 100 INJECTION, SOLUTION INTRAVENOUS at 08:02

## 2024-02-15 RX ADMIN — FENTANYL CITRATE 50 MCG: 50 INJECTION, SOLUTION INTRAMUSCULAR; INTRAVENOUS at 07:02

## 2024-02-15 RX ADMIN — FAMOTIDINE 20 MG: 20 TABLET, FILM COATED ORAL at 10:02

## 2024-02-15 RX ADMIN — DEXTROSE MONOHYDRATE, SODIUM CITRATE, AND CITRIC ACID MONOHYDRATE: 2.45; 2.2; .8 INJECTION, SOLUTION INTRAVENOUS at 03:02

## 2024-02-15 RX ADMIN — FUROSEMIDE 80 MG: 10 INJECTION, SOLUTION INTRAVENOUS at 08:02

## 2024-02-15 RX ADMIN — SEVELAMER CARBONATE 1.6 G: 800 POWDER, FOR SUSPENSION ORAL at 04:02

## 2024-02-15 RX ADMIN — METRONIDAZOLE 500 MG: 5 INJECTION, SOLUTION INTRAVENOUS at 12:02

## 2024-02-15 RX ADMIN — CARVEDILOL 25 MG: 25 TABLET, FILM COATED ORAL at 08:02

## 2024-02-15 RX ADMIN — CEFTRIAXONE 2 G: 2 INJECTION, POWDER, FOR SOLUTION INTRAMUSCULAR; INTRAVENOUS at 07:02

## 2024-02-15 RX ADMIN — PHENYLEPHRINE HYDROCHLORIDE 100 MCG: 10 INJECTION INTRAVENOUS at 07:02

## 2024-02-15 RX ADMIN — CARVEDILOL 25 MG: 25 TABLET, FILM COATED ORAL at 10:02

## 2024-02-15 RX ADMIN — HEPARIN SODIUM 7500 UNITS: 5000 INJECTION INTRAVENOUS; SUBCUTANEOUS at 08:02

## 2024-02-15 RX ADMIN — HEPARIN SODIUM 7500 UNITS: 5000 INJECTION INTRAVENOUS; SUBCUTANEOUS at 02:02

## 2024-02-15 RX ADMIN — ROCURONIUM BROMIDE 30 MG: 10 INJECTION, SOLUTION INTRAVENOUS at 07:02

## 2024-02-15 RX ADMIN — INSULIN ASPART 6 UNITS: 100 INJECTION, SOLUTION INTRAVENOUS; SUBCUTANEOUS at 04:02

## 2024-02-15 RX ADMIN — METRONIDAZOLE 500 MG: 5 INJECTION, SOLUTION INTRAVENOUS at 03:02

## 2024-02-15 RX ADMIN — VECURONIUM BROMIDE 5 MG: 10 INJECTION, POWDER, LYOPHILIZED, FOR SOLUTION INTRAVENOUS at 07:02

## 2024-02-15 RX ADMIN — MAGNESIUM SULFATE HEPTAHYDRATE 2 G: 40 INJECTION, SOLUTION INTRAVENOUS at 06:02

## 2024-02-15 RX ADMIN — METRONIDAZOLE 500 MG: 5 INJECTION, SOLUTION INTRAVENOUS at 08:02

## 2024-02-15 RX ADMIN — MIDAZOLAM HYDROCHLORIDE 2 MG: 1 INJECTION, SOLUTION INTRAMUSCULAR; INTRAVENOUS at 07:02

## 2024-02-15 RX ADMIN — ROCURONIUM BROMIDE 20 MG: 10 INJECTION, SOLUTION INTRAVENOUS at 07:02

## 2024-02-15 RX ADMIN — INSULIN DETEMIR 14 UNITS: 100 INJECTION, SOLUTION SUBCUTANEOUS at 10:02

## 2024-02-15 RX ADMIN — SEVELAMER CARBONATE 1.6 G: 800 POWDER, FOR SUSPENSION ORAL at 12:02

## 2024-02-15 NOTE — PROGRESS NOTES
02/15/24 0330   Treatment   Treatment Type SLED   Treatment Status Restart   Dialysis Machine Number K30   Dialyzer Time (hours) 0   BVP (Liters) 0 L   Solutions Labeled and Current  Yes   Access Temporary Cath   Catheter Dressing Intact  Yes   Alarms Engaged Yes   CRRT Comments CRRT RST   $ CRRT Charges   $ CRRT Charges Restart     Report received from primary RN. CRRT restarted per MD order.

## 2024-02-15 NOTE — PROGRESS NOTES
"[facility]  Endocrinology  Progress Note    Admit Date: 1/29/2024     Reason for Consult: Management of T2DM, Hyperglycemia     Surgical Procedure and Date: n/a    Diabetes diagnosis year: new onset     Home Diabetes Medications:  none per chart review and family present at bedside     Lab Results   Component Value Date    HGBA1C 10.4 (H) 01/30/2024       Diabetes Complications include:     Hyperglycemia, DKA at OSH     Complicating diabetes co morbidities:   Obesity       HPI:   Patient is a 53 y.o. female with a diagnosis of obesity (BMI 53) admitted with N/V and R groin wound found to be in DKA at OSH. 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 While at OSH pt developed acute respiratory failure requiring intubation. Pulmonology was consulted for ventilator management and critical illness. Nephrology was consulted for worsening vishal in the setting of lactic acidosis and septic shock likely ATN, sCr elevated at 3.8 on admit now 4.0 post HD. Pt being admitted to SICU for surgical critical care management. Immediate plans include hemodynamic stabilization, weaning of respiratory support, and RRT.  Endocrinology consulted for management of T2DM.                 Interval HPI:   Overnight events: s/p wound vac placement. BG well controlled on current SQ insulin regimen. Creatinine 2.2; SLED overnight. Diet NPO    Eating:   NPO  Nausea: No  Hypoglycemia and intervention: No  Fever: No  TPN and/or TF: Yes  If yes, type of TF/TPN and rate: TFs at 30 ml/hr (on hold for OR today)     BP (!) 170/75   Pulse 88   Temp 99.7 °F (37.6 °C) (Axillary)   Resp 20   Ht 5' 5" (1.651 m)   Wt (!) 153.3 kg (338 lb)   LMP 01/01/2024 (Approximate) Comment: tubal ligation  SpO2 99%   BMI 56.25 kg/m²     Labs Reviewed and Include    Recent Labs   Lab 02/15/24  0414   *   CALCIUM 7.9*   ALBUMIN 1.7*   PROT 6.9 " "     K 4.6   CO2 23      BUN 27*   CREATININE 2.2*   ALKPHOS 108   ALT 11   AST 27   BILITOT 0.2     Lab Results   Component Value Date    WBC 11.60 02/15/2024    HGB 7.1 (L) 02/15/2024    HCT 22.9 (L) 02/15/2024    MCV 88 02/15/2024     02/15/2024     No results for input(s): "TSH", "FREET4" in the last 168 hours.  Lab Results   Component Value Date    HGBA1C 10.4 (H) 01/30/2024       Nutritional status:   Body mass index is 56.25 kg/m².  Lab Results   Component Value Date    ALBUMIN 1.7 (L) 02/15/2024    ALBUMIN 1.7 (L) 02/14/2024    ALBUMIN 1.7 (L) 02/14/2024    ALBUMIN 1.7 (L) 02/14/2024     No results found for: "PREALBUMIN"    Estimated Creatinine Clearance: 44.6 mL/min (A) (based on SCr of 2.2 mg/dL (H)).    Accu-Checks  Recent Labs     02/13/24  1910 02/13/24  2327 02/13/24  2328 02/14/24  0244 02/14/24  0738 02/14/24  1255 02/14/24  1618 02/14/24  2005 02/14/24  2349 02/15/24  0417   POCTGLUCOSE 183* 220* 183* 182* 164* 167* 169* 165* 155* 128*       Current Medications and/or Treatments Impacting Glycemic Control  Immunotherapy:    Immunosuppressants       None          Steroids:   Hormones (From admission, onward)      None          Pressors:    Autonomic Drugs (From admission, onward)      None          Hyperglycemia/Diabetes Medications:   Antihyperglycemics (From admission, onward)      Start     Stop Route Frequency Ordered    02/13/24 0915  insulin detemir U-100 (Levemir) pen 14 Units         -- SubQ Daily 02/13/24 0906    02/09/24 1200  insulin aspart U-100 pen 6 Units         -- SubQ Every 4 hours 02/09/24 0901    02/03/24 1010  insulin aspart U-100 pen 0-10 Units         -- SubQ Every 4 hours PRN 02/03/24 0911            ASSESSMENT and PLAN    Renal/  * Ayaz's gangrene  Managed per primary team  Optimize BG control        ALVARADO (acute kidney injury)  Lab Results   Component Value Date    CREATININE 2.2 (H) 02/15/2024     Avoid insulin stacking  Titrate insulin slowly "       Endocrine  Morbid (severe) obesity due to excess calories  Body mass index is 56.25 kg/m².  May increase insulin resistance.         New onset type 2 diabetes mellitus  BG goal 140-180  No previous hx of T2DM per family at bedside. A1c of 10.4. DKA at OSH. TF off. BG stable on regimen.      Plan:  - Continue Levemir 14 units daily.  - Continue Novolog 6 units q 4 hrs while on tube feeding (HOLD if tube feeding is stopped or BG < 100)   - Continue Moderate Dose Correction Scale  - BG monitoring q 4 hrs while NPO /TF    ** Please call Endocrine for any BG related issues **      Discharge plans: TBD    Lab Results   Component Value Date    HGBA1C 10.4 (H) 01/30/2024             Zoie Muñiz, NP  Endocrinology  [facility]

## 2024-02-15 NOTE — ASSESSMENT & PLAN NOTE
BG goal 140-180  No previous hx of T2DM per family at bedside. A1c of 10.4. DKA at OSH. TF off. BG stable on regimen.      Plan:  - Continue Levemir 14 units daily.  - Continue Novolog 6 units q 4 hrs while on tube feeding (HOLD if tube feeding is stopped or BG < 100)   - Continue Moderate Dose Correction Scale  - BG monitoring q 4 hrs while NPO /TF    ** Please call Endocrine for any BG related issues **      Discharge plans: TBD    Lab Results   Component Value Date    HGBA1C 10.4 (H) 01/30/2024

## 2024-02-15 NOTE — CARE UPDATE
Clotted once on SLED after runing for one hour and 30 min  Started on Citrate with calcium  150ml, 12ml

## 2024-02-15 NOTE — SUBJECTIVE & OBJECTIVE
Interval History/Significant Events: Pt seen and examined at bedside. BISHNU MULLER. Had CRRT overnight. Plan for LP by anesthesia today and wound vac exchange in the OR today.Heparin held overnight.      Follow-up For: Procedure(s) (LRB):  REPLACEMENT, WOUND VAC (Right)  R thigh wound debridement (Right)    Post-Operative Day: 3 Days Post-Op    Objective:     Vital Signs (Most Recent):  Temp: 98.7 °F (37.1 °C) (02/15/24 0300)  Pulse: 87 (02/15/24 0400)  Resp: (!) 23 (02/15/24 0400)  BP: (!) 151/73 (02/15/24 0400)  SpO2: 100 % (02/15/24 0400) Vital Signs (24h Range):  Temp:  [98.2 °F (36.8 °C)-99.2 °F (37.3 °C)] 98.7 °F (37.1 °C)  Pulse:  [81-98] 87  Resp:  [19-34] 23  SpO2:  [99 %-100 %] 100 %  BP: (116-152)/(58-81) 151/73     Weight:  (unable to obtain; remote requires service)  Body mass index is 56.25 kg/m².      Intake/Output Summary (Last 24 hours) at 2/15/2024 0505  Last data filed at 2/15/2024 0400  Gross per 24 hour   Intake 1153.48 ml   Output 1660 ml   Net -506.52 ml          Physical Exam     Appearance: She is obese.   HENT:      Head: Normocephalic.      Mouth/Throat:      Mouth: Mucous membranes are moist.   Eyes:      Comments: PERRL, no gaze  Neck:      Comments: LIJ Trialysis  Cardiovascular:      Rate and Rhythm: Normal rate and regular rhythm.   Pulmonary:      Effort: Pulmonary effort is normal.      Comments: Intubated  Abdominal:      General: Abdomen is flat.      Palpations: Abdomen is soft.   Genitourinary:     Comments: R groin wound   Glynn in place  Rectal tube  Musculoskeletal:       BLE nonedematous   Skin:     General: Skin is warm.   Neurological:      Comments: intermittent mouth movement to rigorous or LE stimulation. No spontaneous eyes opening    Vents:  Vent Mode: Spont (02/15/24 0346)  Set Rate: 18 BPM (02/06/24 0349)  Vt Set: 420 mL (02/06/24 0349)  Pressure Support: 10 cmH20 (02/15/24 0346)  PEEP/CPAP: 5 cmH20 (02/15/24 0346)  Oxygen Concentration (%): 30 (02/15/24 0400)  Peak  Airway Pressure: 15 cmH20 (02/15/24 0346)  Plateau Pressure: 15 cmH20 (02/15/24 0346)  Total Ve: 9.55 L/m (02/15/24 0346)  Negative Inspiratory Force (cm H2O): 0 (02/15/24 0346)  F/VT Ratio<105 (RSBI): (!) 54.12 (02/15/24 0346)    Lines/Drains/Airways       Central Venous Catheter Line  Duration             Trialysis (Dialysis) Catheter 02/11/24 2303 left internal jugular 3 days              Drain  Duration                  Rectal Tube 02/04/24 1545 10 days         NG/OG Tube 02/06/24 1030 Center mouth 8 days         Urethral Catheter 02/06/24 1900 8 days              Airway  Duration                  Airway - Non-Surgical 01/31/24 1020 14 days              Peripheral Intravenous Line  Duration                  Peripheral IV - Single Lumen 02/13/24 0548 18 G Left Antecubital 1 day         Peripheral IV - Single Lumen 02/13/24 1132 18 G;1 3/4 in Right Forearm 1 day                    Significant Labs:    CBC/Anemia Profile:  Recent Labs   Lab 02/13/24 2032 02/14/24  0251 02/15/24  0414   WBC 14.64* 13.67* 11.60   HGB 7.4* 7.1* 7.1*   HCT 23.3* 22.4* 22.9*    357 350   MCV 85 88 88   RDW 18.5* 18.3* 18.1*        Chemistries:  Recent Labs   Lab 02/14/24  0251 02/14/24  1435 02/14/24  2154   * 133*  133* 132*   K 4.9 4.6  4.6 4.4    104  104 103   CO2 22* 23  23 23   BUN 27* 32*  32* 27*   CREATININE 2.7* 3.1*  3.1* 2.1*   CALCIUM 8.2* 8.5*  8.5* 8.0*   ALBUMIN 1.8* 1.7*  1.7* 1.7*   PROT 6.7  --   --    BILITOT 0.2  --   --    ALKPHOS 106  --   --    ALT 9*  --   --    AST 26  --   --    MG 2.0 2.1 1.9   PHOS 5.7* 6.4*  6.4* 4.9*       Significant Imaging:  I have reviewed all pertinent imaging results/findings within the past 24 hours.

## 2024-02-15 NOTE — OP NOTE
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 She was taken to the OR today for repeat debridement and wound vac placement      Procedure in Detail:   After consent was verified, the patient was taken to the operating room and general anesthesia was initiated successfully. The patient was positioned in lithotomy. The right thigh and groin region was prepped and draped in the normal sterile fashion. Time out was performed and all present were in agreement. We began the procedure by removing the previous wound vac. The wound required minimal debridement of fibrinous exudate. There was healthy granulation tissue in both the thigh wound and the mons pubis wound and groin wound. A new black sponge was cute to size and placed in the wound. The wound vac tape and keily pad were applied and connected to the vac machine. There was good seal/suction. The concluded the procedure. All counts were reported as correct.     Dr. Cole was present for the critical portions of the procedure.      Drains: Wound vac     Estimated Blood Loss (EBL): * No values recorded between 2/12/2024  2:19 PM and 2/12/2024  2:56 PM *     Total IV Fluids: See anesthesia log     Complications: No    Specimens:   Specimen (24h ago, onward)       Start     Ordered    02/15/24 0560  Cytology, Fluid/Wash/Brush  Once        Question Answer Comment   Source: Cerebrospinal fluid  (CSF)    Clinical Information: cSF    Specific Site: CSF    Other Requests: NA        02/15/24 0529                            Condition: Stable    Disposition: ICU - intubated and hemodynamically stable.    Celia Allison MD  General Surgery   PGY-2        .

## 2024-02-15 NOTE — PROGRESS NOTES
[facility] - [facility]ensive [facility]  Critical Care - Surgery  Progress Note    Patient Name: Sarah Saravia  MRN: [id number]  Admission Date: 1/29/2024  Hospital Length of Stay: 17 days  Code Status: Full Code  Attending Provider: Steve Cole MD  [facility]   Principal Problem: Ayaz's gangrene    Subjective:     Hospital/ICU Course:  No notes on file    Interval History/Significant Events: Pt seen and examined at bedside. VSS.  RENZO. Had CRRT overnight. Plan for LP by anesthesia today and wound vac exchange in the OR today.Heparin held overnight.      Follow-up For: Procedure(s) (LRB):  REPLACEMENT, WOUND VAC (Right)  R thigh wound debridement (Right)    Post-Operative Day: 3 Days Post-Op    Objective:     Vital Signs (Most Recent):  Temp: 98.7 °F (37.1 °C) (02/15/24 0300)  Pulse: 87 (02/15/24 0400)  Resp: (!) 23 (02/15/24 0400)  BP: (!) 151/73 (02/15/24 0400)  SpO2: 100 % (02/15/24 0400) Vital Signs (24h Range):  Temp:  [98.2 °F (36.8 °C)-99.2 °F (37.3 °C)] 98.7 °F (37.1 °C)  Pulse:  [81-98] 87  Resp:  [19-34] 23  SpO2:  [99 %-100 %] 100 %  BP: (116-152)/(58-81) 151/73     Weight:  (unable to obtain; remote requires service)  Body mass index is 56.25 kg/m².      Intake/Output Summary (Last 24 hours) at 2/15/2024 0505  Last data filed at 2/15/2024 0400  Gross per 24 hour   Intake 1153.48 ml   Output 1660 ml   Net -506.52 ml          Physical Exam     Appearance: She is obese.   HENT:      Head: Normocephalic.      Mouth/Throat:      Mouth: Mucous membranes are moist.   Eyes:      Comments: PERRL, no gaze  Neck:      Comments: LIJ Trialysis  Cardiovascular:      Rate and Rhythm: Normal rate and regular rhythm.   Pulmonary:      Effort: Pulmonary effort is normal.      Comments: Intubated  Abdominal:      General: Abdomen is flat.      Palpations: Abdomen is soft.   Genitourinary:     Comments: R groin wound   Glynn in place  Rectal tube  Musculoskeletal:        BLE nonedematous   Skin:     General: Skin is warm.   Neurological:      Comments: intermittent mouth movement to rigorous or LE stimulation. No spontaneous eyes opening    Vents:  Vent Mode: Spont (02/15/24 0346)  Set Rate: 18 BPM (02/06/24 0349)  Vt Set: 420 mL (02/06/24 0349)  Pressure Support: 10 cmH20 (02/15/24 0346)  PEEP/CPAP: 5 cmH20 (02/15/24 0346)  Oxygen Concentration (%): 30 (02/15/24 0400)  Peak Airway Pressure: 15 cmH20 (02/15/24 0346)  Plateau Pressure: 15 cmH20 (02/15/24 0346)  Total Ve: 9.55 L/m (02/15/24 0346)  Negative Inspiratory Force (cm H2O): 0 (02/15/24 0346)  F/VT Ratio<105 (RSBI): (!) 54.12 (02/15/24 0346)    Lines/Drains/Airways       Central Venous Catheter Line  Duration             Trialysis (Dialysis) Catheter 02/11/24 2303 left internal jugular 3 days              Drain  Duration                  Rectal Tube 02/04/24 1545 10 days         NG/OG Tube 02/06/24 1030 Center mouth 8 days         Urethral Catheter 02/06/24 1900 8 days              Airway  Duration                  Airway - Non-Surgical 01/31/24 1020 14 days              Peripheral Intravenous Line  Duration                  Peripheral IV - Single Lumen 02/13/24 0548 18 G Left Antecubital 1 day         Peripheral IV - Single Lumen 02/13/24 1132 18 G;1 3/4 in Right Forearm 1 day                    Significant Labs:    CBC/Anemia Profile:  Recent Labs   Lab 02/13/24 2032 02/14/24  0251 02/15/24  0414   WBC 14.64* 13.67* 11.60   HGB 7.4* 7.1* 7.1*   HCT 23.3* 22.4* 22.9*    357 350   MCV 85 88 88   RDW 18.5* 18.3* 18.1*        Chemistries:  Recent Labs   Lab 02/14/24  0251 02/14/24  1435 02/14/24  2154   * 133*  133* 132*   K 4.9 4.6  4.6 4.4    104  104 103   CO2 22* 23  23 23   BUN 27* 32*  32* 27*   CREATININE 2.7* 3.1*  3.1* 2.1*   CALCIUM 8.2* 8.5*  8.5* 8.0*   ALBUMIN 1.8* 1.7*  1.7* 1.7*   PROT 6.7  --   --    BILITOT 0.2  --   --    ALKPHOS 106  --   --    ALT 9*  --   --    AST 26  --   --     MG 2.0 2.1 1.9   PHOS 5.7* 6.4*  6.4* 4.9*       Significant Imaging:  I have reviewed all pertinent imaging results/findings within the past 24 hours.  Assessment/Plan:     Renal/  * Ayaz's gangrene    Neuro/Psych:   #Acute Encephalopathy  CTH 2/3 with scattered hypoattenuation likely representing age indeterminate infarcts. EEG findings consistent with moderate-severe encephalopathy. MRI 2/6: Several scattered punctate acute infarcts throughout the bilateral frontal lobes, centrum semiovale, basal ganglia, corpus callosum, and cerebellar hemispheres.  CTA 2/6: Atherosclerotic plaquing of the carotid bifurcations and proximal ICAs with less than 50% proximal ICA stenosis by NASCET criteria . Repeat CTH and MRI/MRA unchanged from prior exams..    Neuro/Psych  Repeat CTH and MRI/MRA stable from initial reads.   -- Sedation: None  -- Pain: kermit tylenol, dialudid and oxy prn  -- GCS 4T: E2, V1T, M1; exam stable  -- Neurology following; appreciate recs  -- Neurovascular following; appreciate recs  -- Palliative following; GOC conversation 2/7; appreciate recs  -- Plan for LP by IR tomorrow. Hold TF/hep overnight             Cards:   -- HDS  -- goal SBP < 180, MAP >65  -- hypertensive, hydralazine and labetalol prns  -- Continue amlodipine 10mg daily; coreg 25mg BID      Pulm:   #Acute Respiratory Failure  -- Intubated on spontaneous  -- Goal O2 sat > 90%  -- Plan for trach/PEG next week - Per primary      Renal:  #ALVARADO on CKD  #ATN  Received HD 2/9. 2/13 LI trialysis  -- Nephrology following; appreciate recs  -- RRT as needed    Recent Labs   Lab 02/14/24  0251 02/14/24  1435 02/14/24  2154   BUN 27* 32*  32* 27*   CREATININE 2.7* 3.1*  3.1* 2.1*        FEN / GI:   -- Daily CMPs  -- NGT in place   -- Replace lytes as needed  -- Nutrition: NPO, TF held. Will restart (Novasource Renal) at goal 30 ml/hr after OR  -- GI PPX   -- Nutrition following; appreciate recs  -- Continue psyllium      ID:    #Ayaz's  gangrene  s/p wound vac exchange/ debridement x4 for nec fascitis. R groin tissue with proteus, sensitive to ceftriaxone. Growing bacteroids as well.   -- Abx: ceftriaxone and flagyl EOT 2/22  -- ID following ; appreciate recs    Recent Labs   Lab 02/13/24 2032 02/14/24  0251 02/15/24  0414   WBC 14.64* 13.67* 11.60        Heme/Onc:  -- Daily CBC  -- Heparin DVT ppx    Recent Labs   Lab 02/13/24 2032 02/14/24  0251 02/15/24  0414   HGB 7.4* 7.1* 7.1*    357 350        Endo:   #IDDM  Initially presented to OSH with DKA with latest A1C 10.4 AG Gap resolved  Placed on insulin gtt 2/3 and dced 2/12.    - q4h BG monitoring while NPO  - Detemir 12 units daily  - Novolog 6units q4h while on TFs  - Moderate dose SSI PRN  - Endocrine following, appreciate recs      PPx:   Feeding: NPO  Analgesia/Sedation: See above  Thromboembolic prevention: SQH   HOB >30: Y  Stress Ulcer ppx: Famotidine  Glucose control: Goal 140-180 g/dl, kermit detemir and novolog, SSI, Endo following  Bowel reg: psyllium  Invasive Lines/Drains/Airway: Glynn, ETT, LIJ Trialysis, PIV x2, Rectal tube      Dispo/Code Status/Palliative:   -- SICU / Full Code        Robinson Rousseau MD  Critical Care - Surgery  [facility] - Surgical Intensive Care

## 2024-02-15 NOTE — ANESTHESIA PREPROCEDURE EVALUATION
[facility]  Anesthesia Pre-Operative Evaluation    Patient Name: Sarah Saravia  YOB: 1970  MRN: [id number]    SUBJECTIVE:     Pre-operative evaluation for Procedure(s) (LRB):  Lumbar Puncture (N/A)    02/15/2024    Sraah Saravia is a 53 y.o. 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 She is s/p multiple debridements.     MRI on 2/6: Several scattered punctate acute infarcts throughout the bilateral frontal lobes, centrum semiovale, basal ganglia, corpus callosum, and cerebellar hemispheres.  Overall distribution is concerning for embolic etiology    Patient now presents for the above procedure(s).    2D ECHO:  TTE:  Results for orders placed during the hospital encounter of 01/29/24    Echo    Interpretation Summary    Left Ventricle: The left ventricle is normal in size. Normal wall thickness. There is concentric remodeling. Normal wall motion. There is normal systolic function with a visually estimated ejection fraction of 60 - 65%. There is normal diastolic function. Normal left ventricular filling pressure.    Right Ventricle: Normal right ventricular cavity size. Wall thickness is normal. Right ventricle wall motion  is normal. Systolic function is normal.    Aorta: Aortic root is normal in size measuring 2.79 cm. Ascending aorta is normal measuring 2.57 cm.    IVC/SVC: Normal venous pressure at 3 mmHg.      JAY:  No results found for this or any previous visit.    LDA: None documented.  Trialysis (Dialysis) Catheter 02/11/24 2303 left internal jugular (Active)   $ Dialysis Supplies Trialysis Catheter (Supply) 02/13/24 1501   Line Necessity Review CRRT/HD 02/14/24 1901   Verification by X-ray Yes 02/14/24 1901   Site Assessment No drainage;No redness;No swelling;No warmth 02/15/24 0301   Line Securement Device Secured with sutures 02/14/24 1901   Dressing Type CHG impregnated  dressing/sponge;Central line dressing 02/15/24 0301   Dressing Status Clean;Dry;Intact 02/15/24 0301   Dressing Intervention Integrity maintained 02/15/24 0301   Date on Dressing 02/12/24 02/14/24 1901   Dressing Due to be Changed 02/19/24 02/15/24 0301   Venous Patency/Care flushed w/o difficulty;blood return present;normal saline locked 02/15/24 0301   Arterial Patency/Care flushed w/o difficulty;blood return present 02/15/24 0301   Distal Patency/Care infusing 02/15/24 0301   Waveform Not being transduced 02/13/24 2300   Number of days: 3            Peripheral IV - Single Lumen 02/13/24 1132 18 G;1 3/4 in Right Forearm (Active)   Site Assessment Clean;Dry;Intact;No redness;No swelling 02/15/24 0301   Extremity Assessment Distal to IV No abnormal discoloration;No redness;No swelling;No warmth 02/15/24 0301   Line Status Blood return noted;Flushed;Saline locked 02/15/24 0301   Dressing Status Clean;Intact;Dry 02/15/24 0301   Dressing Intervention Integrity maintained 02/15/24 0301   Dressing Change Due 02/17/24 02/15/24 0301   Site Change Due 02/17/24 02/14/24 1901   Reason Not Rotated Not due 02/15/24 0301   Number of days: 2            NG/OG Tube 02/06/24 1030 Center mouth (Active)   Placement Check placement verified by x-ray 02/15/24 0301   Tolerance no signs/symptoms of discomfort 02/15/24 0301   Securement secured to commercial device 02/15/24 0301   Clamp Status/Tolerance clamped 02/15/24 0301   Insertion Site Appearance no redness, warmth, tenderness, skin breakdown, drainage 02/15/24 0301   Drainage Tan 02/13/24 1501   Flush/Irrigation flushed w/;water;no resistance met 02/15/24 0301   Feeding Type continuous 02/14/24 2301   Feeding Action feeding held 02/15/24 0301   Current Rate (mL/hr) 30 mL/hr 02/14/24 2301   Goal Rate (mL/hr) 30 mL/hr 02/14/24 2301   Water Bolus (mL) 120 mL 02/14/24 2200   Rate Formula Tube Feeding (mL/hr) 30 mL/hr 02/06/24 1901   [facility]ula [facility] 02/13/24 2300   Intake  "(mL) - Formula Tube Feeding 0 02/15/24 0000   Residual Amount (ml) 15 ml 02/13/24 2300   Number of days: 9            Rectal Tube 02/04/24 1545 (Active)   Reposition drainage bags for BMS & Glynn on opposite sides of bed 02/15/24 0301   Outcome stool evacuated 02/15/24 0301   Stool Color Brown;Green 02/15/24 0301   Insertion Site Appearance no redness, warmth, tenderness, skin breakdown, drainage 02/15/24 0301   Flush/Irrigation flushed w/;water;no resistance met 02/15/24 0301   Intake (mL) 40 mL 02/13/24 1901   Rectal Tube Output 50 mL 02/14/24 0500   Number of days: 10            Urethral Catheter 02/06/24 1900 (Active)   Site Assessment Clean;Intact 02/15/24 0301   Collection Container Urimeter 02/15/24 0301   Securement Method secured to top of thigh w/ adhesive device 02/15/24 0301   Catheter Care Performed yes 02/15/24 0301   Reason for Continuing Urinary Catheterization Critically ill in ICU and requiring hourly monitoring of intake/output 02/15/24 0301   CAUTI Prevention Bundle Securement Device in place with 1" slack;Intact seal between catheter & drainage tubing;Drainage bag/urimeter off the floor;Sheeting clip in use;No dependent loops or kinks;Drainage bag/urimeter not overfilled (<2/3 full);Drainage bag/urimeter below bladder 02/15/24 0301   Output (mL) 10 mL 02/15/24 1100   Number of days: 8       Prev airway:     Intubation     Date/Time: 1/31/2024 10:20 AM     Performed by: Brett Sanchez CRNA  Authorized by: Florian Loaiza II, MD    Intubation:     Induction:  Intravenous    Intubated:  Postinduction    Mask Ventilation:  Not attempted    Attempts:  1    Attempted By:  CRNA    Method of Intubation:  Video laryngoscopy    Blade:  [place] 3    Laryngeal View Grade: Grade I - full view of cords      Difficult Airway Encountered?: No      Complications:  None    Airway Device:  Oral endotracheal tube    Airway Device Size:  7.5    Inflation Amount (mL):  6    Tube secured:  21    Secured at:  The " lips    Placement Verified By:  Capnometry    Complicating Factors:  Obesity and short neck    Findings Post-Intubation:  BS equal bilateral and atraumatic/condition of teeth unchanged       Drips: None documented.   calcium gluconate 3 g in dextrose 5 % (D5W) 100 mL infusion Stopped (02/15/24 0702)    dextrose 10 % in water (D10W)      dextrose-sod citrate-citric ac Stopped (02/15/24 0702)       Patient Active Problem List   Diagnosis    Diabetic acidosis without coma    Ayaz's gangrene    Morbid (severe) obesity due to excess calories    Severe sepsis    ALVARADO (acute kidney injury)    Hyponatremia    Metabolic acidosis    Encephalopathy, metabolic    Acute hypoxemic respiratory failure    Weaning from mechanically assisted ventilation initiated    Acute blood loss anemia    Acute renal failure with tubular necrosis    New onset type 2 diabetes mellitus    Encephalopathy    Leukocytosis    Ac isch multi vasc territories stroke    Cerebral infarction    Encounter for palliative care    Hypoalbuminemia    Hyperphosphatemia    Fever       Review of patient's allergies indicates:  No Known Allergies    Current Inpatient Medications:   sodium chloride 0.9%   Intravenous Once    amLODIPine  10 mg Per OG tube Daily    carvediloL  25 mg Per OG tube BID    cefTRIAXone (Rocephin) IV (PEDS and ADULTS)  2 g Intravenous Daily    famotidine  20 mg Per OG tube Daily    furosemide (LASIX) injection  80 mg Intravenous Q12H    insulin aspart U-100  6 Units Subcutaneous Q4H    insulin detemir U-100  14 Units Subcutaneous Daily    metronidazole  500 mg Intravenous Q8H    psyllium husk (aspartame)  1 packet Per OG tube BID    sevelamer carbonate  1.6 g Per OG tube TID WM       Antibiotics (From admission, onward)      Start     Stop Route Frequency Ordered    02/14/24 1200  cefTRIAXone (ROCEPHIN) 2 g in dextrose 5 % in water (D5W) 100 mL IVPB (MB+)         -- IV Daily 02/14/24 1046    02/14/24 1200  metronidazole IVPB 500 mg          -- IV Every 8 hours (non-standard times) 02/14/24 1051            VTE Risk Mitigation (From admission, onward)           Ordered     IP VTE HIGH RISK PATIENT  Once         01/31/24 1331     Place sequential compression device  Until discontinued         01/30/24 0016     Place NATE hose  Until discontinued         01/30/24 0016                    No current facility-administered medications on file prior to encounter.     No current outpatient medications on file prior to encounter.       Past Surgical History:   Procedure Laterality Date    INCISION AND DRAINAGE OF PERIRECTAL REGION N/A 1/29/2024    Procedure: INCISION AND DRAINAGE, PERIRECTAL REGION;  Surgeon: Axel Ramsay MD;  Location: [facility] OR;  Service: General;  Laterality: N/A;    PLACEMENT, TRIALYSIS CATH Right 1/31/2024    Procedure: INSERTION, CATHETER, TRIPLE LUMEN, HEMODIALYSIS, TEMPORARY;  Surgeon: Alvin Junior MD;  Location: [facility] OR;  Service: General;  Laterality: Right;    REPLACEMENT OF WOUND VACUUM-ASSISTED CLOSURE DEVICE Right 2/12/2024    Procedure: REPLACEMENT, WOUND VAC;  Surgeon: Mundo Carmona MD;  Location: [address];  Service: General;  Laterality: Right;    WOUND DEBRIDEMENT Bilateral 2/2/2024    Procedure: DEBRIDEMENT, WOUND;  Surgeon: Steve Cole MD;  Location: [address];  Service: General;  Laterality: Bilateral;  Bilateral groin  Possible wound vac placement    WOUND DEBRIDEMENT Right 2/6/2024    Procedure: DEBRIDEMENT, WOUND, replace wound vac, possible closure;  Surgeon: Steve Cole MD;  Location: [address];  Service: General;  Laterality: Right;  RLE    WOUND DEBRIDEMENT Right 2/9/2024    Procedure: DEBRIDEMENT, WOUND w wound vac change;  Surgeon: Steve Cole MD;  Location: [address];  Service: General;  Laterality: Right;  RLE    WOUND DEBRIDEMENT Right 2/12/2024    Procedure: R thigh wound debridement;  Surgeon: Mundo Carmona MD;  Location: [facility] OR [address] [place] [address];  Service: General;   Laterality: Right;    WOUND EXPLORATION Right 1/31/2024    Procedure: IRRIGATION & DEBRIDEMENT, WOUND DEBRIDEMENT;  Surgeon: Alvin Junior MD;  Location: [facility];  Service: General;  Laterality: Right;       Social History     Socioeconomic History    Marital status: Single     Social Determinants of Health     Financial Resource Strain: Patient Unable To Answer (1/31/2024)    Overall Financial Resource Strain (CARDIA)     Difficulty of Paying Living Expenses: Patient unable to answer   Food Insecurity: Patient Declined (1/31/2024)    Hunger Vital Sign     Worried About Running Out of Food in the Last Year: Patient declined     Ran Out of Food in the Last Year: Patient declined   Transportation Needs: Patient Unable To Answer (1/31/2024)    PRAPARE - Transportation     Lack of Transportation (Medical): Patient unable to answer     Lack of Transportation (Non-Medical): Patient unable to answer   Stress: Patient Unable To Answer (1/31/2024)    [employer] - Occupational Stress Questionnaire     Feeling of Stress : Patient unable to answer   Housing Stability: Patient Unable To Answer (1/31/2024)    Housing Stability Vital Sign     Unable to Pay for Housing in the Last Year: Patient unable to answer     Unstable Housing in the Last Year: Patient unable to answer       OBJECTIVE:     Vital Signs Range (Last 24H):  Temp:  [36.8 °C (98.2 °F)-37.6 °C (99.7 °F)]   Pulse:  [81-93]   Resp:  [18-28]   BP: (112-173)/()   SpO2:  [97 %-100 %]       Significant Labs:  Lab Results   Component Value Date    WBC 11.60 02/15/2024    HGB 7.1 (L) 02/15/2024    HCT 22.9 (L) 02/15/2024     02/15/2024    ALT 11 02/15/2024    AST 27 02/15/2024     02/15/2024    K 4.6 02/15/2024     02/15/2024    CREATININE 2.2 (H) 02/15/2024    BUN 27 (H) 02/15/2024    CO2 23 02/15/2024    INR 1.0 02/01/2024    HGBA1C 10.4 (H) 01/30/2024       Diagnostic Studies: No relevant studies.    EKG:   Results  for orders placed or performed during the hospital encounter of 01/29/24   EKG 12-lead    Collection Time: 02/14/24  8:53 PM   Result Value Ref Range    QRS Duration 156 ms    OHS QTC Calculation 466 ms    Narrative    Test Reason : R94.31,    Vent. Rate : 085 BPM     Atrial Rate : 085 BPM     P-R Int : 188 ms          QRS Dur : 156 ms      QT Int : 392 ms       P-R-T Axes : 061 067 023 degrees     QTc Int : 466 ms    Normal sinus rhythm  Right bundle branch block  Abnormal ECG  When compared with ECG of 12-FEB-2024 23:48,  T wave inversion now evident in Anterior leads  Confirmed by GREGG GRANT MD ([id number]) on 2/15/2024 9:32:44 AM    Referred By: [email or web address]   SELF           Confirmed By:GREGG GRANT MD         ASSESSMENT/PLAN:           Pre-op Assessment    I have reviewed the Patient Summary Reports.     I have reviewed the Nursing Notes. I have reviewed the NPO Status.      Review of Systems  Anesthesia Hx:  No problems with previous Anesthesia   History of prior surgery of interest to airway management or planning:          Denies Family Hx of Anesthesia complications.    Denies Personal Hx of Anesthesia complications.                    Social:  Non-Smoker       Hematology/Oncology:  Hematology Normal   Oncology Normal                                   EENT/Dental:  EENT/Dental Normal           Cardiovascular:         Denies CABG/stent.     Denies CHF.                                 Pulmonary:    Denies COPD.  Denies Asthma.                    Renal/:  Chronic Renal Disease, ARF, Dialysis                Hepatic/GI:  Hepatic/GI Normal                 Musculoskeletal:  Musculoskeletal Normal                Neurological:   CVA    Denies Seizures.                                Endocrine:  Diabetes         Morbid Obesity / BMI > 40  Dermatological:  Skin Normal    Psych:  Psychiatric Normal                    Physical Exam  General: Well nourished    Airway:  Mallampati: unable to assess   Pre-Existing  Airway: Oral Endotracheal tube        Anesthesia Plan  Type of Anesthesia, risks & benefits discussed:    Anesthesia Type: Gen ETT  Intra-op Monitoring Plan: Standard ASA Monitors  Post Op Pain Control Plan: multimodal analgesia and IV/PO Opioids PRN  Induction:  IV  Airway Plan: Direct  Informed Consent: Informed consent signed with the Patient representative and all parties understand the risks and agree with anesthesia plan.  All questions answered.   ASA Score: 4  Day of Surgery Review of History & Physical: H&P Update referred to the surgeon/provider.    Ready For Surgery From Anesthesia Perspective.     .

## 2024-02-15 NOTE — SUBJECTIVE & OBJECTIVE
Interval History: NAEON. Afebrile. HDS. SLED overnight. To OR this AM for vac change. LP today     Medications:  Continuous Infusions:   sodium chloride 0.9% Stopped (02/14/24 2328)    calcium gluconate 3 g in dextrose 5 % (D5W) 100 mL infusion Stopped (02/15/24 0702)    dextrose 10 % in water (D10W)      dextrose-sod citrate-citric ac Stopped (02/15/24 0702)     Scheduled Meds:   sodium chloride 0.9%   Intravenous Once    amLODIPine  10 mg Per OG tube Daily    carvediloL  25 mg Per OG tube BID    cefTRIAXone (Rocephin) IV (PEDS and ADULTS)  2 g Intravenous Daily    famotidine  20 mg Per OG tube Daily    insulin aspart U-100  6 Units Subcutaneous Q4H    insulin detemir U-100  14 Units Subcutaneous Daily    metronidazole  500 mg Intravenous Q8H    psyllium husk (aspartame)  1 packet Per OG tube BID    sevelamer carbonate  1.6 g Per OG tube TID WM     PRN Meds:0.9%  NaCl infusion (for blood administration), sodium chloride 0.9%, acetaminophen, albuterol-ipratropium, dextrose 10 % in water (D10W), dextrose 10%, dextrose 10%, glucagon (human recombinant), glucose, glucose, hydrALAZINE, insulin aspart U-100, labetalol, magnesium sulfate IVPB, naloxone, ondansetron, oxyCODONE, prochlorperazine, sodium chloride 0.9%, sodium chloride 0.9%, sodium chloride 0.9%, sodium phosphate 20.01 mmol in dextrose 5 % (D5W) 250 mL IVPB, sodium phosphate 30 mmol in dextrose 5 % (D5W) 250 mL IVPB, sodium phosphate 39.99 mmol in dextrose 5 % (D5W) 250 mL IVPB     Review of patient's allergies indicates:  No Known Allergies  Objective:     Vital Signs (Most Recent):  Temp: 99.7 °F (37.6 °C) (02/15/24 0715)  Pulse: 89 (02/15/24 0700)  Resp: (!) 22 (02/15/24 0700)  BP: (!) 142/78 (02/15/24 0700)  SpO2: 100 % (02/15/24 0700) Vital Signs (24h Range):  Temp:  [98.2 °F (36.8 °C)-99.7 °F (37.6 °C)] 99.7 °F (37.6 °C)  Pulse:  [81-91] 89  Resp:  [19-28] 22  SpO2:  [99 %-100 %] 100 %  BP: (112-152)/(58-82) 142/78     Weight:  (unable to obtain; remote  requires service)  Body mass index is 56.25 kg/m².    Intake/Output - Last 3 Shifts         02/13 0700  02/14 0659 02/14 0700  02/15 0659 02/15 0700 02/16 0659    I.V. (mL/kg)  475.6 (3.1) 38.9 (0.3)    NG/ 270     IV Piggyback 374.8 871.4 406    Total Intake(mL/kg) 1014.8 (6.6) 1617 (10.5) 445 (2.9)    Urine (mL/kg/hr) 335 (0.1) 1045 (0.3)     Other 450 1359 324    Stool 50      Total Output 835 2404 324    Net +179.8 -787 +121                    Physical Exam  Vitals and nursing note reviewed.   Constitutional:       General: She is not in acute distress.     Appearance: She is ill-appearing.   HENT:      Head: Normocephalic and atraumatic.   Eyes:      Extraocular Movements: Extraocular movements intact.      Conjunctiva/sclera: Conjunctivae normal.   Neck:      Comments: Left IJ Trialysis catheter  Cardiovascular:      Rate and Rhythm: Normal rate and regular rhythm.   Pulmonary:      Effort: Pulmonary effort is normal.      Comments: Vent Mode: Spont  Oxygen Concentration (%):  [30] 30  Resp Rate Total:  [16 br/min-45 br/min] 23 br/min  PEEP/CPAP:  [5 cmH20] 5 cmH20  Pressure Support:  [10 cmH20] 10 cmH20  Mean Airway Pressure:  [8.2 smJ55-50 cmH20] 11 cmH20  Abdominal:      General: There is no distension.      Palpations: Abdomen is soft.      Tenderness: There is no abdominal tenderness.   Genitourinary:     Comments: Glynn in place  Rectal Tube  Skin:     General: Skin is warm and dry.      Comments: Wound vac in place to R thigh/groin to suction   Neurological:      General: No focal deficit present.      Mental Status: She is oriented to person, place, and time.   Psychiatric:         Mood and Affect: Mood normal.         Behavior: Behavior normal.          Significant Labs:  I have reviewed all pertinent lab results within the past 24 hours.  CBC:   Recent Labs   Lab 02/15/24  0414   WBC 11.60   RBC 2.59*   HGB 7.1*   HCT 22.9*      MCV 88   MCH 27.4   MCHC 31.0*       BMP:   Recent Labs    Lab 02/15/24  0414   *      K 4.6      CO2 23   BUN 27*   CREATININE 2.2*   CALCIUM 7.9*   MG 1.8         Significant Diagnostics:  I have reviewed all pertinent imaging results/findings within the past 24 hours.

## 2024-02-15 NOTE — PROGRESS NOTES
"[facility] - Surgical Intensive Care  Adult Nutrition  Progress Note    SUMMARY       Recommendations    1) Resume TF once able postop- Novasource renal @ 30 mL/hr to provide 1440 kcal, 65g PRO, and 516ml FF- FWF per MD     2) If dialysis resumes, recommend changing formula to higher PRO formula - Impact Peptide 1.5 at goal rate of 40ml/hr to provide 1440 kcal, 90g PRO, and 739ml FF (100% of EEN and 79% of EPN)    3) RD team to monitor tolerance, skin, labs, wt and follow-up as needed    Goals: Meet % EEN/EPN by follow-up date.  Nutrition Goal Status: progressing towards goal  Communication of RD Recs:  (POC)    Assessment and Plan    Nutrition Problem  Inadequate oral intake     Related to (etiology):   Diagnosis related symptoms     Signs and Symptoms (as evidenced by):   Intubated  NPO     Interventions/Recommendations (treatment strategy):  Collaboration with medical providers     Nutrition Diagnosis Status:   Continues    Reason for Assessment    Reason For Assessment: RD follow-up  Diagnosis:  (ros's gangrene)  Relevant Medical History: obesity, T2DM, CKD  Interdisciplinary Rounds: did not attend    General Information Comments: RD f/u: pt resting comfortably, remains on vent. TF were turned off for procedure. Pt received CRRT last night, per MD. Pt in OR for wound vac change yesterday and going for lumbar puncture tomorrow. Per caregiver in the room, MD's were hoping to re-start feeds. Pt is at risk for malnutrition. RD team to continue to monitor.    Nutrition Discharge Planning: pending medical course    Nutrition Risk Screen    Nutrition Risk Screen: tube feeding or parenteral nutrition    Nutrition/Diet History    Food Allergies: NKFA    Anthropometrics    Temp: 98.6 °F (37 °C)  Height Method: Stated  Height: 5' 5" (165.1 cm)  Height (inches): 65 in  Weight Method: Bed Scale  Weight:  (unable to obtain; remote requires service)  Weight (lb): (!) 338 lb  Ideal Body Weight (IBW), Female: 125 lb  % " Ideal Body Weight, Female (lb): 285.6 %  BMI (Calculated): 56.2  BMI Grade: greater than 40 - morbid obesity    Lab/Procedures/Meds    Pertinent Labs Reviewed: reviewed  Pertinent Labs Comments: BUN: 25, cr: 2.2, GFR: 26.2, gluc: 131, Ca: 8.3, phos: 4.7, alb: 1.8    Pertinent Medications Reviewed: reviewed  Pertinent Medications Comments: Abx, famotidine, lasix, heparin, insulin, psyllium husk, sevelamer, Ca Gluc, dextrose-sod citrate-citric    Estimated/Assessed Needs    Weight Used For Calorie Calculations: 57 kg (125 lb 10.6 oz) (IBW d/t BMI >50.0)  Energy Calorie Requirements (kcal): 1425 kcal  Energy Need Method: Kcal/kg (25 kcal/kg of IBW d/t BMI >50.0)    Protein Requirements: 114- 143g (2.0-2.5g/kg)  Weight Used For Protein Calculations: 57 kg (125 lb 10.6 oz) (IBW d/t BMI >50.0)    Estimated Fluid Requirement Method: RDA Method  RDA Method (mL): 1425    Nutrition Prescription Ordered    Current Diet Order: NPO  Current Nutrition Support Formula Ordered: [facility]  Current Nutrition Support Rate Ordered: 30 (ml)    Evaluation of Received Nutrient/Fluid Intake    Enteral Calories (kcal):  (-)  Enteral Protein (gm):  (-)  Enteral (Free Water) Fluid (mL):  (-)  I/O: +2.4L since 2/8  Energy Calories Required: not meeting needs  Protein Required: not meeting needs  Fluid Required:  (as per MD)  Comments: LBM 2/15 (loose)  % Intake of Estimated Energy Needs: 0 - 25 %  % Meal Intake: NPO    Nutrition Risk    Level of Risk/Frequency of Follow-up:  (RD to f/u x 1-2/week)     Monitor and Evaluation    Food and Nutrient Intake: enteral nutrition intake, parenteral nutrition intake  Food and Nutrient Adminstration: enteral and parenteral nutrition administration  Knowledge/Beliefs/Attitudes: food and nutrition knowledge/skill, beliefs and attitudes  Physical Activity and Function: nutrition-related ADLs and IADLs, factors affecting access to physical activity  Anthropometric Measurements: weight, weight change,  body mass index  Biochemical Data, Medical Tests and Procedures: electrolyte and renal panel  Nutrition-Focused Physical Findings: overall appearance     Nutrition Follow-Up    RD Follow-up?: Yes

## 2024-02-15 NOTE — PROGRESS NOTES
[facility] - Surgical Intensive Care  Palliative Medicine  Progress Note    Patient Name: Sarah Saravia  MRN: [id number]  Admission Date: 1/29/2024  Hospital Length of Stay: 17 days  Code Status: Full Code   Attending Provider: Steve Cole MD  Consulting Provider: Estee Guzman MD  [facility]  Principal Problem:Ayaz's gangrene    Patient information was obtained from relative(s) and primary team.      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 Palliative and Supportive Care was consulted to explore goals of care.    Advance Care Planning  Goals of Care  - Code status: Full code  - Next of kin: two adult children   - Daughter [facility] [phone]   - Son is in FDC  - ACP documents: none  - Patient does not have decision making capacity  - Prognosis: guarded  - Goals: life prolongation  - Plans: continue full supportive care, will follow along for family support during this very vulnerable time as prognosis is clarified    Goals of Care Conversation:  - 2/7/24: I met Ms. Saravia, intubated, not on sedation but not responsive or following commands, on spontaneous breathing trial. No family at bedside. I called her daughter [facility], who shared her understanding that her mother was newly found to have many mini-strokes in her brain, but believed that the doctors were looking for the source of the clot. We reviewed her mother's severe  medical illnesses, including Yaaz's gangrene for which she received several debridements/wound vac exchanges, multiple IV antibiotics which she is still on, severe hyperglycemia, dependence on ventilator to protect her airway in setting of severe sepsis, acute renal failure now dependent on dialysis machine and encephalopathy for which the doctors discovered multiple mini-strokes. I shared that I learned that the team engaged the stroke physicians, who expressed concern that the pattern of stroke in the setting of severe infection might be related to each other, and are searching for possible sources of infection hiding elsewhere in her body, including her heart. Aleks asked if possibly the sponge inside her mother's wound was the source of infection, and I shared that knowing that that the surgeons are still doing wound vac exchanges, that likely it isn't because they are treating her proactively to not only wipe away any infection that they can, but to also proactively treat her to minimize her risk of worsening infection. I admitted that I'm not a surgeon and cannot explain this as helpful as I wish I could. Aleks shared that thankfully the doctor on the surgical team has been communicating in understandable terms so feels assured that she is being informed thoroughly and trusts that she can ask these questions again in the future. She was appropriately tearful as I explained concern that her mother is critically ill and that her surgical team is aggressively treating her and thoroughly looking for any opportunities to help her get better. Aleks expressed gratefulness for this.  - I asked Aleks to share with me more about her mother. 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 She lives with her long-time partner of 26-28 years. Aleks is 31-years-old so recognized this man as her step-father and consider each other as family, though Ms. Saravia and  her partner are not legally . Ms. Saravia also has a son, but he is in senior living. She has five grandchildren (through son), enjoys watching movies, going out to eat, and shopping all day. This sudden illness has been a huge shock and change in her life, and are hoping to help her return to her original life. Validated how terrifying and overwhelming this is, and reassured her that the doctors are working very aggressively in hopes to restore as much of her mother's health. Did warn that while the doctors promise to be thoughtful, compassionate and thorough, it doesn't mean we can promise the results we pray and hope for. Aleks expressed understanding. Will continue to follow along for support as Ms. Saravia's trajectory unfolds.  - 2/12/24: Met Ms. Saravia and her long-time boyfriend Mr. Gallegos at bedside. Supportive conversation held which Ms. Saravia could not participate due to persistent encephalopathy. Mr. Gallegos expresses deep thanks to the SICU team for doing everything they can to find out why she is persistently encephalopathic. He shared how shocking it was for the sudden decline after her first operation. He shares that Ms. Saravia has always been a very active woman, echoing Aleks who had shared with me earlier about her love for shopping, eating at restaurants and going to the movies. He admits that they've never discussed end of life or preferences related to artificial life support. 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 He thinks that as an active woman, a life committed to the bed would be devastating. However, today he saw her open her eyes to his voice and move her foot. This is an improvement in her neuro status and he is hoping that this is a sign that there is more improvement in the future. Agreed that it's so important to hold hope for a meaningful recovery and also staying mindful that  we respect Ms. Saravia's dignity and quality of life by not committing her to a life that she would not find acceptable. He reiterated family's deep desire to exhaust all opportunities to help her improve. Reassured him that her team is absolutely doing this and also staying mindful/transparent about their own worries about our limitations as human beings as well. He tells me that he spends the nights with Ms. Saravia, leaves around 11 AM to return home to wash up/take care of responsibilities. I shared I will return later this week in the morning to make sure I can check in again during the hours he is in the hospital.  - 2/15/24: Remains encephalopathic despite no sedation, doing well on SBT. Returned from OR for wound vac exchange and LP attempted but will be reattempted later due to needle not being long enough to receive CSF fluid. Spoke with daughter on the phone who was very aware of the above, and she shared her conversation about trach/PEG with the surgery team earlier. She shared her understanding that these were reversible interventions in the future when Ms. Saravia recovered. She shared that expectations are for her mother to make a meaningful recovery. I asked what if Ms. Saravia did not improve, and she had to live the rest of her life dependent on others, on machines with trach/PEG. I asked her what would Ms. Saravia say in response to that, and Aleks admits that she doesn't know. There was quite a bit of silence during this conversation. Acknowledged how difficult this is, but that life after trach/PEG would involve likely nursing homes for SNF, and likely trajectory of back and forth from SNF to hospital. Aleks remains hopeful that her mother will make a meaningful recovery and return home. Ms. Saravia's friends are in a very similar field of health care, and would help out at home as well.  - Would benefit from counseling regarding prognosis and unlikeliness to recover mental status back to baseline or  inability to restore independence and prior quality of life. Otherwise, family is expecting meaningful recovery which is why they would like to proceed with full supportive care.             I will follow-up with patient. Please contact us if you have any additional questions.    Subjective:     Chief Complaint:   Chief Complaint   Patient presents with    Vomiting     Vomiting and diarrhea for 3-4 days.   Wound to posterior right thigh.        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 Palliative and Supportive Care was consulted to explore goals of care.    Hospital Course:  No notes on file    Interval History: Intubated, not sedated and not following commands. Returned from OR for wound vac change. LP attempted but needle was not long enough for successful LP. Spoke with daughter on the phone.    Past Medical History:  T2DM  CKD3a    Past Surgical History:   Procedure Laterality Date    INCISION AND DRAINAGE OF PERIRECTAL REGION N/A 1/29/2024    Procedure: INCISION AND DRAINAGE, PERIRECTAL REGION;  Surgeon: Axel Ramsay MD;  Location: [facility];  Service: General;  Laterality: N/A;    PLACEMENT, TRIALYSIS CATH Right 1/31/2024    Procedure: INSERTION, CATHETER, TRIPLE LUMEN, HEMODIALYSIS, TEMPORARY;  Surgeon: Alvin Junior MD;  Location: [facility] OR;  Service: General;  Laterality: Right;    REPLACEMENT OF WOUND VACUUM-ASSISTED CLOSURE DEVICE Right 2/12/2024    Procedure: REPLACEMENT, WOUND VAC;  Surgeon: Mundo Carmona MD;  Location: [facility] OR [address] [place] [address];  Service: General;  Laterality:  Right;    WOUND DEBRIDEMENT Bilateral 2/2/2024    Procedure: DEBRIDEMENT, WOUND;  Surgeon: Steve Cole MD;  Location: [facility] OR [facility];  Service: General;  Laterality: Bilateral;  Bilateral groin  Possible wound vac placement    WOUND DEBRIDEMENT Right 2/6/2024    Procedure: DEBRIDEMENT, WOUND, replace wound vac, possible closure;  Surgeon: Steve Cole MD;  [facility];  Service: General;  Laterality: Right;  RLE    WOUND DEBRIDEMENT Right 2/9/2024    Procedure: DEBRIDEMENT, WOUND w wound vac change;  Surgeon: Steve Cole MD;  [facility];  Service: General;  Laterality: Right;  RLE    WOUND DEBRIDEMENT Right 2/12/2024    Procedure: R thigh wound debridement;  Surgeon: Mundo Carmona MD;  [facility];  Service: General;  Laterality: Right;    WOUND EXPLORATION Right 1/31/2024    Procedure: IRRIGATION & DEBRIDEMENT, WOUND DEBRIDEMENT;  Surgeon: Alvin Junior MD;  Location: [facility] OR;  Service: General;  Laterality: Right;       Review of patient's allergies indicates:  No Known Allergies    Medications:  Continuous Infusions:   calcium gluconate 3 g in dextrose 5 % (D5W) 100 mL infusion Stopped (02/15/24 0702)    dextrose 10 % in water (D10W)      dextrose-sod citrate-citric ac Stopped (02/15/24 0702)     Scheduled Meds:   sodium chloride 0.9%   Intravenous Once    amLODIPine  10 mg Per OG tube Daily    carvediloL  25 mg Per OG tube BID    cefTRIAXone (Rocephin) IV (PEDS and ADULTS)  2 g Intravenous Daily    famotidine  20 mg Per OG tube Daily    furosemide (LASIX) injection  80 mg Intravenous Q12H    heparin (porcine)  7,500 Units Subcutaneous Once    heparin (porcine)  7,500 Units Subcutaneous Once    insulin aspart U-100  6 Units Subcutaneous Q4H    insulin detemir U-100  14 Units Subcutaneous Daily    metronidazole  500 mg Intravenous Q8H    psyllium husk (aspartame)  1 packet Per OG tube BID    sevelamer carbonate  1.6 g Per OG tube TID WM     PRN Meds:0.9%   NaCl infusion (for blood administration), sodium chloride 0.9%, acetaminophen, albuterol-ipratropium, dextrose 10 % in water (D10W), dextrose 10%, dextrose 10%, glucagon (human recombinant), glucose, glucose, hydrALAZINE, insulin aspart U-100, labetalol, naloxone, ondansetron, oxyCODONE, prochlorperazine, sodium chloride 0.9%, sodium chloride 0.9%, sodium chloride 0.9%    Family History    None       Tobacco Use    Smoking status: Not on file    Smokeless tobacco: Not on file   Substance and Sexual Activity    Alcohol use: Not on file    Drug use: Not on file    Sexual activity: Not on file       Review of Systems   Unable to perform ROS: Mental status change     Objective:     Vital Signs (Most Recent):  Temp: 99.8 °F (37.7 °C) (02/15/24 1200)  Pulse: 84 (02/15/24 1230)  Resp: (!) 23 (02/15/24 1000)  BP: 136/70 (02/15/24 1230)  SpO2: 100 % (02/15/24 1230) Vital Signs (24h Range):  Temp:  [98.2 °F (36.8 °C)-99.8 °F (37.7 °C)] 99.8 °F (37.7 °C)  Pulse:  [81-93] 84  Resp:  [18-28] 23  SpO2:  [97 %-100 %] 100 %  BP: (112-173)/() 136/70     Weight:  (unable to obtain; remote requires service)  Body mass index is 56.25 kg/m².       Physical Exam  Constitutional:       Appearance: She is obese.   HENT:      Head: Normocephalic and atraumatic.      Right Ear: External ear normal.      Left Ear: External ear normal.      Nose: Nose normal.      Mouth/Throat:      Mouth: Mucous membranes are dry.   Eyes:      Conjunctiva/sclera: Conjunctivae normal.   Cardiovascular:      Rate and Rhythm: Normal rate.   Pulmonary:      Breath sounds: Normal breath sounds.      Comments: On spontaneous breathing trial, ETT connected to ventilator  Abdominal:      General: Bowel sounds are normal.      Palpations: Abdomen is soft.   Musculoskeletal:         General: Swelling present.   Lymphadenopathy:      Cervical: No cervical adenopathy.   Skin:     General: Skin is warm and dry.   Neurological:      Comments: Not following commands  despite no sedation            Review of Symptoms      Symptom Assessment (ESAS 0-10 Scale)  Unable to complete assessment due to Mental status change         Pain Assessment in Advanced Demential Scale (PAINAD)   Breathing - Independent of vocalization:  0  Negative vocalization:  0  Facial expression:  0  Body language:  0  Consolability:  0  Total:  0    Living Arrangements:  Lives with spouse    Psychosocial/Cultural:   See Palliative Psychosocial Note: No  Lives with long-time partner - not legally , together for 26 to 28 years. Has adult daughter (age 31) and adult son, who is in detention. Has five grandchildren. Enjoyed watching movies, going out to eat, shopping. Worked as a patient caregiver for blind patients.  **Primary  to Follow**  Palliative Care  Consult: No    Spiritual:  F - Belen and Belief:  Believes in God  I - Importance:  Important  C - Community:  Does not attend [employer]  A - Address in Care:  Engage  support        Advance Care Planning  Advance Directives:   Living Will: No    LaPOST: No    Do Not Resuscitate Status: No    Medical Power of : No      Decision Making:  Family answered questions  Goals of Care: What is most important right now is to focus on curative/life-prolongation (regardless of treatment burdens). Accordingly, we have decided that the best plan to meet the patient's goals includes continuing with treatment.         Significant Labs: CBC:   Recent Labs   Lab 02/13/24 2032 02/14/24  0251 02/15/24  0414   WBC 14.64* 13.67* 11.60   HGB 7.4* 7.1* 7.1*   HCT 23.3* 22.4* 22.9*    357 350       CMP:   Recent Labs   Lab 02/14/24 0251 02/14/24  1435 02/14/24  2154 02/15/24  0414   * 133*  133* 132* 136   K 4.9 4.6  4.6 4.4 4.6    104  104 103 107   CO2 22* 23  23 23 23   * 162*  162* 140* 123*   BUN 27* 32*  32* 27* 27*   CREATININE 2.7* 3.1*  3.1* 2.1* 2.2*   CALCIUM 8.2* 8.5*  8.5* 8.0* 7.9*   PROT 6.7   --   --  6.9   ALBUMIN 1.8* 1.7*  1.7* 1.7* 1.7*   BILITOT 0.2  --   --  0.2   ALKPHOS 106  --   --  108   AST 26  --   --  27   ALT 9*  --   --  11   ANIONGAP 8 6*  6* 6* 6*       CBC:   Recent Labs   Lab 02/15/24  0414   WBC 11.60   HGB 7.1*   HCT 22.9*   MCV 88          BMP:  Recent Labs   Lab 02/15/24  0414   *      K 4.6      CO2 23   BUN 27*   CREATININE 2.2*   CALCIUM 7.9*   MG 1.8       LFT:  Lab Results   Component Value Date    AST 27 02/15/2024    ALKPHOS 108 02/15/2024    BILITOT 0.2 02/15/2024     Albumin:   Albumin   Date Value Ref Range Status   02/15/2024 1.7 (L) 3.5 - 5.2 g/dL Final     Protein:   Total Protein   Date Value Ref Range Status   02/15/2024 6.9 6.0 - 8.4 g/dL Final     Lactic acid:   Lab Results   Component Value Date    LACTATE 1.5 02/01/2024    LACTATE 2.4 (H) 01/31/2024       Significant Imaging: I have reviewed all pertinent imaging results/findings within the past 24 hours.    I spent a total of 50 minutes on the day of the visit. This includes face to face time in discussion of goals of care, symptom assessment, coordination of care and emotional support. This also includes non-face to face time preparing to see the patient (eg, review of tests/imaging), obtaining and/or reviewing separately obtained history, documenting clinical information in the electronic or other health record, independently interpreting results and communicating results to the patient/family/caregiver, or care coordinator.      Estee Guzman MD  Palliative Medicine  [facility] - Surgical Intensive Care

## 2024-02-15 NOTE — PLAN OF CARE
"      SICU PLAN OF CARE NOTE    Dx: Ayaz's gangrene    Vital Signs: /65   Pulse 86   Temp 98.2 °F (36.8 °C) (Oral)   Resp 20   Ht 5' 5" (1.651 m)   Wt (!) 153.3 kg (338 lb)   LMP 01/01/2024 (Approximate) Comment: tubal ligation  SpO2 100%   BMI 56.25 kg/m²     SHIFT EVENTS:  VSS / No acute events this shift. SLED for 2 hours before clotting off. Citrate and calcium gluconate added and SLED restarted. Plans for Lp and wound vac exchange today.    Neuro: opens eyes to noxious stimuli    Respiratory: Ventilator    Cardiac: NSR; HR 80s; SBP < 170; MAP >65    Diet: NPO at midnight    Urine Output: Urinary Catheter 550 ml/shift; CRRT    Drains:     Wound Vac 100 cc / shift  ;seal intact    Labs/Accuchecks: RFP, Mg, ionized Ca 2p, 10p, 4a; dailylabs; Accucheck Q4    SKIN NOTE:  Skin: No new skin breakdown or skin tears noted at this time.    Skin precautions maintained including:  Sacrum and heels with foam dressing in place for pressure protection. Frequent weight shift assistance provided Q2 hr to prevent breakdown. Bed plugged in and mattress inflated; [employer] Specialty bed utilized. Adhesive use limited. Heels elevated off bed.  Pressure points protected and positioning supports utilized.  Skin-to-device areas padded. Skin-to-skin areas padded    POC reviewed with patient and family; questions and concerns addressed. See flow sheets for full assessment details.     "

## 2024-02-15 NOTE — ASSESSMENT & PLAN NOTE
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 Palliative and Supportive Care was consulted to explore goals of care.    Advance Care Planning   Goals of Care  - Code status: Full code  - Next of kin: two adult children   - Daughter [facility] [phone]   - Son is in senior care  - ACP documents: none  - Patient does not have decision making capacity  - Prognosis: guarded  - Goals: life prolongation  - Plans: continue full supportive care, will follow along for family support during this very vulnerable time as prognosis is clarified    Goals of Care Conversation:  - 2/7/24: I met Ms. Saravia, intubated, not on sedation but not responsive or following commands, on spontaneous breathing trial. No family at bedside. I called her daughter [facility], who shared her understanding that her mother was newly found to have many mini-strokes in her brain, but believed that the doctors were looking for the source of the clot. We reviewed her mother's severe medical illnesses, including Ayaz's gangrene for which she received several debridements/wound vac exchanges, multiple IV antibiotics which she is still on, severe hyperglycemia, dependence on ventilator to protect her airway in setting of severe sepsis, acute renal failure now dependent on dialysis machine and encephalopathy for which the doctors discovered multiple mini-strokes. I shared that I learned that the team engaged the stroke physicians, who expressed concern that the pattern of stroke in the setting of severe  infection might be related to each other, and are searching for possible sources of infection hiding elsewhere in her body, including her heart. Aleks asked if possibly the sponge inside her mother's wound was the source of infection, and I shared that knowing that that the surgeons are still doing wound vac exchanges, that likely it isn't because they are treating her proactively to not only wipe away any infection that they can, but to also proactively treat her to minimize her risk of worsening infection. I admitted that I'm not a surgeon and cannot explain this as helpful as I wish I could. Aleks shared that thankfully the doctor on the surgical team has been communicating in understandable terms so feels assured that she is being informed thoroughly and trusts that she can ask these questions again in the future. She was appropriately tearful as I explained concern that her mother is critically ill and that her surgical team is aggressively treating her and thoroughly looking for any opportunities to help her get better. Aleks expressed gratefulness for this.  - I asked Aleks to share with me more about her mother. 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 She lives with her long-time partner of 26-28 years. Aleks is 31-years-old so recognized this man as her step-father and consider each other as family, though Ms. Saravia and her partner are not legally . Ms. Saravia also has a son, but he is in nursing home. She has five grandchildren (through son), enjoys watching movies, going out to eat, and shopping all day. This sudden illness has been a huge shock and change in her life, and are hoping to help her return to her original life. Validated how terrifying and overwhelming this is, and reassured her that the doctors are working very aggressively in hopes to restore as much of her mother's health. Did warn that while the doctors promise to be  thoughtful, compassionate and thorough, it doesn't mean we can promise the results we pray and hope for. Aleks expressed understanding. Will continue to follow along for support as Ms. Saravia's trajectory unfolds.  - 2/12/24: Met Ms. Saravia and her long-time boyfriend Mr. Gallegos at bedside. Supportive conversation held which Ms. Saravia could not participate due to persistent encephalopathy. Mr. Gallegos expresses deep thanks to the SICU team for doing everything they can to find out why she is persistently encephalopathic. He shared how shocking it was for the sudden decline after her first operation. He shares that Ms. Saravia has always been a very active woman, echoing Aleks who had shared with me earlier about her love for shopping, eating at restaurants and going to the movies. He admits that they've never discussed end of life or preferences related to artificial life support. 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 He thinks that as an active woman, a life committed to the bed would be devastating. However, today he saw her open her eyes to his voice and move her foot. This is an improvement in her neuro status and he is hoping that this is a sign that there is more improvement in the future. Agreed that it's so important to hold hope for a meaningful recovery and also staying mindful that we respect Ms. Saravia's dignity and quality of life by not committing her to a life that she would not find acceptable. He reiterated family's deep desire to exhaust all opportunities to help her improve. Reassured him that her team is absolutely doing this and also staying mindful/transparent about their own worries about our limitations as human beings as well. He tells me that he spends the nights with Ms. Saravia, leaves around 11 AM to return home to wash up/take care of responsibilities. I shared I will return  later this week in the morning to make sure I can check in again during the hours he is in the hospital.  - 2/15/24: Remains encephalopathic despite no sedation, doing well on SBT. Returned from OR for wound vac exchange and LP attempted but will be reattempted later due to needle not being long enough to receive CSF fluid. Spoke with daughter on the phone who was very aware of the above, and she shared her conversation about trach/PEG with the surgery team earlier. She shared her understanding that these were reversible interventions in the future when Ms. Saravia recovered. She shared that expectations are for her mother to make a meaningful recovery. I asked what if Ms. Saravia did not improve, and she had to live the rest of her life dependent on others, on machines with trach/PEG. I asked her what would Ms. Saravia say in response to that, and Aleks admits that she doesn't know. There was quite a bit of silence during this conversation. Acknowledged how difficult this is, but that life after trach/PEG would involve likely nursing homes for SNF, and likely trajectory of back and forth from SNF to hospital. Aleks remains hopeful that her mother will make a meaningful recovery and return home. Ms. Saravia's friends are in a very similar field of health care, and would help out at home as well.  - Would benefit from counseling regarding prognosis and unlikeliness to recover mental status back to baseline or inability to restore independence and prior quality of life. Otherwise, family is expecting meaningful recovery which is why they would like to proceed with full supportive care.

## 2024-02-15 NOTE — SUBJECTIVE & OBJECTIVE
"Interval HPI:   Overnight events: s/p wound vac placement. BG well controlled on current SQ insulin regimen. Creatinine 2.2; SLED overnight. Diet NPO    Eating:   NPO  Nausea: No  Hypoglycemia and intervention: No  Fever: No  TPN and/or TF: Yes  If yes, type of TF/TPN and rate: TFs at 30 ml/hr (on hold for OR today)     BP (!) 170/75   Pulse 88   Temp 99.7 °F (37.6 °C) (Axillary)   Resp 20   Ht 5' 5" (1.651 m)   Wt (!) 153.3 kg (338 lb)   LMP 01/01/2024 (Approximate) Comment: tubal ligation  SpO2 99%   BMI 56.25 kg/m²     Labs Reviewed and Include    Recent Labs   Lab 02/15/24  0414   *   CALCIUM 7.9*   ALBUMIN 1.7*   PROT 6.9      K 4.6   CO2 23      BUN 27*   CREATININE 2.2*   ALKPHOS 108   ALT 11   AST 27   BILITOT 0.2     Lab Results   Component Value Date    WBC 11.60 02/15/2024    HGB 7.1 (L) 02/15/2024    HCT 22.9 (L) 02/15/2024    MCV 88 02/15/2024     02/15/2024     No results for input(s): "TSH", "FREET4" in the last 168 hours.  Lab Results   Component Value Date    HGBA1C 10.4 (H) 01/30/2024       Nutritional status:   Body mass index is 56.25 kg/m².  Lab Results   Component Value Date    ALBUMIN 1.7 (L) 02/15/2024    ALBUMIN 1.7 (L) 02/14/2024    ALBUMIN 1.7 (L) 02/14/2024    ALBUMIN 1.7 (L) 02/14/2024     No results found for: "PREALBUMIN"    Estimated Creatinine Clearance: 44.6 mL/min (A) (based on SCr of 2.2 mg/dL (H)).    Accu-Checks  Recent Labs     02/13/24 1910 02/13/24 2327 02/13/24 2328 02/14/24  0244 02/14/24  0738 02/14/24  1255 02/14/24  1618 02/14/24 2005 02/14/24  2349 02/15/24  0417   POCTGLUCOSE 183* 220* 183* 182* 164* 167* 169* 165* 155* 128*       Current Medications and/or Treatments Impacting Glycemic Control  Immunotherapy:    Immunosuppressants       None          Steroids:   Hormones (From admission, onward)      None          Pressors:    Autonomic Drugs (From admission, onward)      None          Hyperglycemia/Diabetes Medications: "   Antihyperglycemics (From admission, onward)      Start     Stop Route Frequency Ordered    02/13/24 0915  insulin detemir U-100 (Levemir) pen 14 Units         -- SubQ Daily 02/13/24 0906    02/09/24 1200  insulin aspart U-100 pen 6 Units         -- SubQ Every 4 hours 02/09/24 0901    02/03/24 1010  insulin aspart U-100 pen 0-10 Units         -- SubQ Every 4 hours PRN 02/03/24 0911

## 2024-02-15 NOTE — ASSESSMENT & PLAN NOTE
53 y.o. 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     - Last [place] change 2/12  - OR today for vac change, will discuss plans for trach/PEG/permacath/stoma in the setting of fevers. No fevers in 24 hours. Likely plan for next week pending family discussions and LP results  - LP today - FUP results   - Palliative following given overall poor prognosis, daughter would like everything done; MRI slightly improved but no changes to patient's neuro status  - Daily SBTs  - Okay for DVT ppx   - Remainder of care per SICU

## 2024-02-15 NOTE — PROGRESS NOTES
[facility] - Surgical Intensive Care  General Surgery  Progress Note    Subjective:     History of Present Illness:  53 year old morbidly obese female who does not routinely go to the doctor presents to the ED with malaise, nausea, vomiting, and groin, buttock, perineal swelling and tenderness. She began feeling ill a few days ago.     On arrival to the ED she is tachycardic to 120, hypertensive without fever. Labs demonstrate leukocytosis of 21, , with lactic acidosis and associated ALVARADO with cr 3.8, hyperglycemia with blood glucose of 824 accompanied by severe electrolyte derangements. CT imaging obtained demonstrates diffuse subcutaneous air along buttocks, perineum, anterior vulva, as well as bilateral thighs.     No prior abdominal surgeries. She denies problems with her heart or lungs. Non smoker. Does not routinely take any medications.    Post-Op Info:  Procedure(s) (LRB):  REPLACEMENT, WOUND VAC (N/A)   Day of Surgery     Interval History: NAEON. Afebrile. HDS. SLED overnight. To OR this AM for vac change. LP today     Medications:  Continuous Infusions:   sodium chloride 0.9% Stopped (02/14/24 2328)    calcium gluconate 3 g in dextrose 5 % (D5W) 100 mL infusion Stopped (02/15/24 0702)    dextrose 10 % in water (D10W)      dextrose-sod citrate-citric ac Stopped (02/15/24 0702)     Scheduled Meds:   sodium chloride 0.9%   Intravenous Once    amLODIPine  10 mg Per OG tube Daily    carvediloL  25 mg Per OG tube BID    cefTRIAXone (Rocephin) IV (PEDS and ADULTS)  2 g Intravenous Daily    famotidine  20 mg Per OG tube Daily    insulin aspart U-100  6 Units Subcutaneous Q4H    insulin detemir U-100  14 Units Subcutaneous Daily    metronidazole  500 mg Intravenous Q8H    psyllium husk (aspartame)  1 packet Per OG tube BID    sevelamer carbonate  1.6 g Per OG tube TID WM     PRN Meds:0.9%  NaCl infusion (for blood administration), sodium chloride 0.9%, acetaminophen, albuterol-ipratropium, dextrose 10 % in  water (D10W), dextrose 10%, dextrose 10%, glucagon (human recombinant), glucose, glucose, hydrALAZINE, insulin aspart U-100, labetalol, magnesium sulfate IVPB, naloxone, ondansetron, oxyCODONE, prochlorperazine, sodium chloride 0.9%, sodium chloride 0.9%, sodium chloride 0.9%, sodium phosphate 20.01 mmol in dextrose 5 % (D5W) 250 mL IVPB, sodium phosphate 30 mmol in dextrose 5 % (D5W) 250 mL IVPB, sodium phosphate 39.99 mmol in dextrose 5 % (D5W) 250 mL IVPB     Review of patient's allergies indicates:  No Known Allergies  Objective:     Vital Signs (Most Recent):  Temp: 99.7 °F (37.6 °C) (02/15/24 0715)  Pulse: 89 (02/15/24 0700)  Resp: (!) 22 (02/15/24 0700)  BP: (!) 142/78 (02/15/24 0700)  SpO2: 100 % (02/15/24 0700) Vital Signs (24h Range):  Temp:  [98.2 °F (36.8 °C)-99.7 °F (37.6 °C)] 99.7 °F (37.6 °C)  Pulse:  [81-91] 89  Resp:  [19-28] 22  SpO2:  [99 %-100 %] 100 %  BP: (112-152)/(58-82) 142/78     Weight:  (unable to obtain; remote requires service)  Body mass index is 56.25 kg/m².    Intake/Output - Last 3 Shifts         02/13 0700  02/14 0659 02/14 0700  02/15 0659 02/15 0700  02/16 0659    I.V. (mL/kg)  475.6 (3.1) 38.9 (0.3)    NG/ 270     IV Piggyback 374.8 871.4 406    Total Intake(mL/kg) 1014.8 (6.6) 1617 (10.5) 445 (2.9)    Urine (mL/kg/hr) 335 (0.1) 1045 (0.3)     Other 450 1359 324    Stool 50      Total Output 835 2404 324    Net +179.8 -787 +121                   Physical Exam  Vitals and nursing note reviewed.   Constitutional:       General: She is not in acute distress.     Appearance: She is ill-appearing.   HENT:      Head: Normocephalic and atraumatic.   Eyes:      Extraocular Movements: Extraocular movements intact.      Conjunctiva/sclera: Conjunctivae normal.   Neck:      Comments: Left IJ Trialysis catheter  Cardiovascular:      Rate and Rhythm: Normal rate and regular rhythm.   Pulmonary:      Effort: Pulmonary effort is normal.      Comments: Vent Mode: Spont  Oxygen  Concentration (%):  [30] 30  Resp Rate Total:  [16 br/min-45 br/min] 23 br/min  PEEP/CPAP:  [5 cmH20] 5 cmH20  Pressure Support:  [10 cmH20] 10 cmH20  Mean Airway Pressure:  [8.2 sxI65-54 cmH20] 11 cmH20  Abdominal:      General: There is no distension.      Palpations: Abdomen is soft.      Tenderness: There is no abdominal tenderness.   Genitourinary:     Comments: Glynn in place  Rectal Tube  Skin:     General: Skin is warm and dry.      Comments: Wound vac in place to R thigh/groin to suction   Neurological:      General: No focal deficit present.      Mental Status: She is oriented to person, place, and time.   Psychiatric:         Mood and Affect: Mood normal.         Behavior: Behavior normal.          Significant Labs:  I have reviewed all pertinent lab results within the past 24 hours.  CBC:   Recent Labs   Lab 02/15/24  0414   WBC 11.60   RBC 2.59*   HGB 7.1*   HCT 22.9*      MCV 88   MCH 27.4   MCHC 31.0*       BMP:   Recent Labs   Lab 02/15/24  0414   *      K 4.6      CO2 23   BUN 27*   CREATININE 2.2*   CALCIUM 7.9*   MG 1.8         Significant Diagnostics:  I have reviewed all pertinent imaging results/findings within the past 24 hours.  Assessment/Plan:     * Ayaz's gangrene  53 y.o. 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     - Last [place] change 2/12  - OR today for vac change, will discuss plans for trach/PEG/permacath/stoma in the setting of fevers. No fevers in 24 hours. Likely plan for next week pending family discussions and LP results  - LP today - FUP results   - Palliative following given overall poor prognosis, daughter would like everything done; MRI slightly improved but no changes to patient's neuro status  - Daily SBTs  - Okay for DVT ppx   - Remainder of care per SICU    Case discussed with Dr. Cole.      SIM GuyC  General Surgery  [facility] -  Surgical Intensive Care

## 2024-02-15 NOTE — PROGRESS NOTES
I personally saw and examined the patient on SLED which he is currently on for the removal of uremic toxins and volume control.  The patient is tolerating the treatment, see SLED flow sheet for vitals and assessemts. I also reviewed the chart, flow sheets and current medication.   The UFR, BFR, DFR and dialysis bath was adjusted accordingly.    Patient with increased urine output yd and over night. Will hold dialysis today.   Would facilitate urine output with furosemide 80mg iv bid.

## 2024-02-15 NOTE — SUBJECTIVE & OBJECTIVE
Interval History: Intubated, not sedated and not following commands. Returned from OR for wound vac change. LP attempted but needle was not long enough for successful LP. Spoke with daughter on the phone.    Past Medical History:  T2DM  CKD3a    Past Surgical History:   Procedure Laterality Date    INCISION AND DRAINAGE OF PERIRECTAL REGION N/A 1/29/2024    Procedure: INCISION AND DRAINAGE, PERIRECTAL REGION;  Surgeon: Axel Ramsay MD;  Location: [facility] OR;  Service: General;  Laterality: N/A;    PLACEMENT, TRIALYSIS CATH Right 1/31/2024    Procedure: INSERTION, CATHETER, TRIPLE LUMEN, HEMODIALYSIS, TEMPORARY;  Surgeon: Alvin Junior MD;  Location: [facility] OR;  Service: General;  Laterality: Right;    REPLACEMENT OF WOUND VACUUM-ASSISTED CLOSURE DEVICE Right 2/12/2024    Procedure: REPLACEMENT, WOUND VAC;  Surgeon: Mundo Carmona MD;  Location: [facility] OR [address] [place] [address];  Service: General;  Laterality: Right;    WOUND DEBRIDEMENT Bilateral 2/2/2024    Procedure: DEBRIDEMENT, WOUND;  Surgeon: Steve Cole MD;  Location: [facility] OR [address] [place] [address];  Service: General;  Laterality: Bilateral;  Bilateral groin  Possible wound vac placement    WOUND DEBRIDEMENT Right 2/6/2024    Procedure: DEBRIDEMENT, WOUND, replace wound vac, possible closure;  Surgeon: Steve Cole MD;  Location: [facility] OR [address] [place] [address];  Service: General;  Laterality: Right;  RLE    WOUND DEBRIDEMENT Right 2/9/2024    Procedure: DEBRIDEMENT, WOUND w wound vac change;  Surgeon: Steve Cole MD;  Location: [facility] OR [address] [place] [address];  Service: General;  Laterality: Right;  RLE    WOUND DEBRIDEMENT Right 2/12/2024    Procedure: R thigh wound debridement;  Surgeon: Mundo Carmona MD;  [facility];  Service: General;  Laterality: Right;    WOUND EXPLORATION Right 1/31/2024    Procedure: IRRIGATION & DEBRIDEMENT, WOUND DEBRIDEMENT;  Surgeon: Alvin Junior MD;  Location: [facility] OR;  Service: General;  Laterality: Right;       Review of patient's allergies  indicates:  No Known Allergies    Medications:  Continuous Infusions:   calcium gluconate 3 g in dextrose 5 % (D5W) 100 mL infusion Stopped (02/15/24 0702)    dextrose 10 % in water (D10W)      dextrose-sod citrate-citric ac Stopped (02/15/24 0702)     Scheduled Meds:   sodium chloride 0.9%   Intravenous Once    amLODIPine  10 mg Per OG tube Daily    carvediloL  25 mg Per OG tube BID    cefTRIAXone (Rocephin) IV (PEDS and ADULTS)  2 g Intravenous Daily    famotidine  20 mg Per OG tube Daily    furosemide (LASIX) injection  80 mg Intravenous Q12H    heparin (porcine)  7,500 Units Subcutaneous Once    heparin (porcine)  7,500 Units Subcutaneous Once    insulin aspart U-100  6 Units Subcutaneous Q4H    insulin detemir U-100  14 Units Subcutaneous Daily    metronidazole  500 mg Intravenous Q8H    psyllium husk (aspartame)  1 packet Per OG tube BID    sevelamer carbonate  1.6 g Per OG tube TID WM     PRN Meds:0.9%  NaCl infusion (for blood administration), sodium chloride 0.9%, acetaminophen, albuterol-ipratropium, dextrose 10 % in water (D10W), dextrose 10%, dextrose 10%, glucagon (human recombinant), glucose, glucose, hydrALAZINE, insulin aspart U-100, labetalol, naloxone, ondansetron, oxyCODONE, prochlorperazine, sodium chloride 0.9%, sodium chloride 0.9%, sodium chloride 0.9%    Family History    None       Tobacco Use    Smoking status: Not on file    Smokeless tobacco: Not on file   Substance and Sexual Activity    Alcohol use: Not on file    Drug use: Not on file    Sexual activity: Not on file       Review of Systems   Unable to perform ROS: Mental status change     Objective:     Vital Signs (Most Recent):  Temp: 99.8 °F (37.7 °C) (02/15/24 1200)  Pulse: 84 (02/15/24 1230)  Resp: (!) 23 (02/15/24 1000)  BP: 136/70 (02/15/24 1230)  SpO2: 100 % (02/15/24 1230) Vital Signs (24h Range):  Temp:  [98.2 °F (36.8 °C)-99.8 °F (37.7 °C)] 99.8 °F (37.7 °C)  Pulse:  [81-93] 84  Resp:  [18-28] 23  SpO2:  [97 %-100 %] 100  %  BP: (112-173)/() 136/70     Weight:  (unable to obtain; remote requires service)  Body mass index is 56.25 kg/m².       Physical Exam  Constitutional:       Appearance: She is obese.   HENT:      Head: Normocephalic and atraumatic.      Right Ear: External ear normal.      Left Ear: External ear normal.      Nose: Nose normal.      Mouth/Throat:      Mouth: Mucous membranes are dry.   Eyes:      Conjunctiva/sclera: Conjunctivae normal.   Cardiovascular:      Rate and Rhythm: Normal rate.   Pulmonary:      Breath sounds: Normal breath sounds.      Comments: On spontaneous breathing trial, ETT connected to ventilator  Abdominal:      General: Bowel sounds are normal.      Palpations: Abdomen is soft.   Musculoskeletal:         General: Swelling present.   Lymphadenopathy:      Cervical: No cervical adenopathy.   Skin:     General: Skin is warm and dry.   Neurological:      Comments: Not following commands despite no sedation            Review of Symptoms      Symptom Assessment (ESAS 0-10 Scale)  Unable to complete assessment due to Mental status change         Pain Assessment in Advanced Demential Scale (PAINAD)   Breathing - Independent of vocalization:  0  Negative vocalization:  0  Facial expression:  0  Body language:  0  Consolability:  0  Total:  0    Living Arrangements:  Lives with spouse    Psychosocial/Cultural:   See Palliative Psychosocial Note: No  Lives with long-time partner - not legally , together for 26 to 28 years. Has adult daughter (age 31) and adult son, who is in assisted. Has five grandchildren. Enjoyed watching movies, going out to eat, shopping. Worked as a patient caregiver for blind patients.  **Primary  to Follow**  Palliative Care  Consult: No    Spiritual:  F - Belen and Belief:  Believes in God  I - Importance:  Important  C - Community:  Does not attend Alevism  A - Address in Care:  Engage  support        Advance Care Planning   Advance  Directives:   Living Will: No    LaPOST: No    Do Not Resuscitate Status: No    Medical Power of : No      Decision Making:  Family answered questions  Goals of Care: What is most important right now is to focus on curative/life-prolongation (regardless of treatment burdens). Accordingly, we have decided that the best plan to meet the patient's goals includes continuing with treatment.         Significant Labs: CBC:   Recent Labs   Lab 02/13/24  2032 02/14/24  0251 02/15/24  0414   WBC 14.64* 13.67* 11.60   HGB 7.4* 7.1* 7.1*   HCT 23.3* 22.4* 22.9*    357 350       CMP:   Recent Labs   Lab 02/14/24 0251 02/14/24  1435 02/14/24  2154 02/15/24  0414   * 133*  133* 132* 136   K 4.9 4.6  4.6 4.4 4.6    104  104 103 107   CO2 22* 23  23 23 23   * 162*  162* 140* 123*   BUN 27* 32*  32* 27* 27*   CREATININE 2.7* 3.1*  3.1* 2.1* 2.2*   CALCIUM 8.2* 8.5*  8.5* 8.0* 7.9*   PROT 6.7  --   --  6.9   ALBUMIN 1.8* 1.7*  1.7* 1.7* 1.7*   BILITOT 0.2  --   --  0.2   ALKPHOS 106  --   --  108   AST 26  --   --  27   ALT 9*  --   --  11   ANIONGAP 8 6*  6* 6* 6*       CBC:   Recent Labs   Lab 02/15/24  0414   WBC 11.60   HGB 7.1*   HCT 22.9*   MCV 88          BMP:  Recent Labs   Lab 02/15/24  0414   *      K 4.6      CO2 23   BUN 27*   CREATININE 2.2*   CALCIUM 7.9*   MG 1.8       LFT:  Lab Results   Component Value Date    AST 27 02/15/2024    ALKPHOS 108 02/15/2024    BILITOT 0.2 02/15/2024     Albumin:   Albumin   Date Value Ref Range Status   02/15/2024 1.7 (L) 3.5 - 5.2 g/dL Final     Protein:   Total Protein   Date Value Ref Range Status   02/15/2024 6.9 6.0 - 8.4 g/dL Final     Lactic acid:   Lab Results   Component Value Date    LACTATE 1.5 02/01/2024    LACTATE 2.4 (H) 01/31/2024       Significant Imaging: I have reviewed all pertinent imaging results/findings within the past 24 hours.

## 2024-02-15 NOTE — EICU
"Intervention Initiated From:  Bedside    Teofilo intervened regarding:  Documentation    Nurse Notified:  Yes    Doctor Notified:  Yes    Comments: Called to room via ealert to complete "Time-Out" for Lumbar Puncture by Dr. NADER Gallegos, assisted by Dr. Mcarthur. Procedure was unsuccessful in removing spinal fluid. Entry site cleaned and dressed by MD.   "

## 2024-02-15 NOTE — NURSING
Citrate and calcium gluconate orders placed by MD Ariella. MD orders to restart pt on SLED until she has reached 10 hours tptal SLED or has to go to OR this AM.  [facility] dialysis RN notified to restart pt.

## 2024-02-15 NOTE — ASSESSMENT & PLAN NOTE
Lab Results   Component Value Date    CREATININE 2.2 (H) 02/15/2024     Avoid insulin stacking  Titrate insulin slowly

## 2024-02-15 NOTE — ANESTHESIA PROCEDURE NOTES
Lumbar Puncture    Diagnosis: Encephalopathy  Patient location during procedure: ICU  Start time: 2/15/2024 9:05 AM  Timeout: 2/15/2024 9:00 AM  End time: 2/15/2024 9:25 AM    Staffing  Authorizing Provider: Talha Gallegos MD  Performing Provider: Talha Gallegos MD    Staffing  Other anesthesia staff: Easton Mcarthur MD  Performed by: Talha Gallegos MD  Authorized by: Talha Gallegos MD    Preanesthetic Checklist  Completed: patient identified, IV checked, site marked, risks and benefits discussed, surgical consent, monitors and equipment checked, pre-op evaluation and timeout performed  Spinal Block  Patient position: right lateral decubitus  Prep: ChloraPrep  Patient monitoring: heart rate, continuous pulse ox, frequent blood pressure checks and cardiac monitor  Approach: midline  Location: L3-4  Injection technique: lumbar puncture  Fluid appearance: No CSF flow back.  Needle  Needle type: Tuohy.   Needle gauge: 17G.  Needle length: 5 in  Additional Documentation: negative aspiration for heme and no paresthesia on injection  Needle localization: anatomical landmarks  Assessment  Ease of block: difficult  Additional Notes  Attempted lumbar puncture, able to palpate spinous processes with tip of needle, attempted walk over spinous processes and ligament clearly penetrated but no CSF flow appreciated. Failed attempt likely due to body habitus. 3 attempts. Recommend IR attempt under imaging.

## 2024-02-15 NOTE — TRANSFER OF CARE
"Anesthesia Transfer of Care Note    Patient: Sarah Saravia    Procedure(s) Performed: Procedure(s) (LRB):  REPLACEMENT, WOUND VAC (N/A)    Patient location: ICU    Anesthesia Type: general    Transport from OR: Continuous SpO2 monitoring in transport. Continuous ECG monitoring in transport. Upon arrival to PACU/ICU, patient attached to ventilator and auscultated to confirm bilateral breath sounds and adequate TV    Post pain: adequate analgesia    Post assessment: no apparent anesthetic complications and tolerated procedure well    Post vital signs: stable    Level of consciousness: unresponsive    Nausea/Vomiting: no nausea/vomiting    Complications: none    Transfer of care protocol was followed      Last vitals: Visit Vitals  BP (!) 142/78 (BP Location: Left arm, Patient Position: Lying)   Pulse 89   Temp 37.6 °C (99.7 °F) (Axillary)   Resp (!) 22   Ht 5' 5" (1.651 m)   Wt (!) 153.3 kg (338 lb)   LMP 01/01/2024 (Approximate)   SpO2 100%   BMI 56.25 kg/m²     "

## 2024-02-15 NOTE — ASSESSMENT & PLAN NOTE
Neuro/Psych:   #Acute Encephalopathy  CTH 2/3 with scattered hypoattenuation likely representing age indeterminate infarcts. EEG findings consistent with moderate-severe encephalopathy. MRI 2/6: Several scattered punctate acute infarcts throughout the bilateral frontal lobes, centrum semiovale, basal ganglia, corpus callosum, and cerebellar hemispheres.  CTA 2/6: Atherosclerotic plaquing of the carotid bifurcations and proximal ICAs with less than 50% proximal ICA stenosis by NASCET criteria . Repeat CTH and MRI/MRA unchanged from prior exams..    Neuro/Psych  Repeat CTH and MRI/MRA stable from initial reads.   -- Sedation: None  -- Pain: kermit tylenol, dialudid and oxy prn  -- GCS 4T: E2, V1T, M1; exam stable  -- Neurology following; appreciate recs  -- Neurovascular following; appreciate recs  -- Palliative following; GOC conversation 2/7; appreciate recs  -- LP by anesthesia today. Hep held overnight             Cards:   -- HDS  -- goal SBP < 180, MAP >65  -- hypertensive, hydralazine and labetalol prns  -- Continue amlodipine 10mg daily; coreg 25mg BID      Pulm:   #Acute Respiratory Failure  -- Intubated on spontaneous , Pressure support.  -- Goal O2 sat > 90%  -- Will discuss trach/PEG plan with team      Renal:  #ALVARADO on CKD  #ATN  Received HD 2/9. 2/13 LI trialysis  -- Nephrology following; appreciate recs  -- RRT as needed    Recent Labs   Lab 02/14/24  0251 02/14/24  1435 02/14/24  2154   BUN 27* 32*  32* 27*   CREATININE 2.7* 3.1*  3.1* 2.1*        FEN / GI:   -- Daily CMPs  -- NGT in place   -- Replace lytes as needed  -- Nutrition: NPO, TF held. 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 R groin tissue with proteus, sensitive to ceftriaxone. Growing bacteroids as well.   -- Abx: ceftriaxone and flagyl EOT 2/22  -- ID  following ; appreciate recs    Recent Labs   Lab 02/13/24 2032 02/14/24  0251 02/15/24  0414   WBC 14.64* 13.67* 11.60        Heme/Onc:  -- Daily CBC  -- Heparin DVT ppx    Recent Labs   Lab 02/13/24  2032 02/14/24  0251 02/15/24  0414   HGB 7.4* 7.1* 7.1*    357 350        Endo:   #IDDM  Initially presented to OSH with DKA with latest A1C 10.4 AG Gap resolved  Placed on insulin gtt 2/3 and dced 2/12.    - q4h BG monitoring while NPO  - Detemir 12 units daily  - Novolog 6units q4h while on TFs  - Moderate dose SSI PRN  - Endocrine following, appreciate recs      PPx:   Feeding: NPO  Analgesia/Sedation: See above  Thromboembolic prevention: SQH   HOB >30: Y  Stress Ulcer ppx: Famotidine  Glucose control: Goal 140-180 g/dl, kermit detemir and novolog, SSI, Endo following  Bowel reg: psyllium  Invasive Lines/Drains/Airway: Glynn, ETT, LIJ Trialysis, PIV x2, Rectal tube      Dispo/Code Status/Palliative:   -- SICU / Full Code

## 2024-02-15 NOTE — BRIEF OP NOTE
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 Both wounds are progressiving appropriately without overt sign of infection. Wound vac changed with adequate seal and suction.     Estimated Blood Loss: * No values recorded between 2/15/2024  7:54 AM and 2/15/2024  8:31 AM *    Estimated Blood Loss has been documented.         Specimens:   Specimen (24h ago, onward)       Start     Ordered    02/15/24 0529  Cytology, Fluid/Wash/Brush  Once        Question Answer Comment   Source: Cerebrospinal fluid (CSF)    Clinical Information: cSF    Specific Site: CSF    Other Requests: NA        02/15/24 0529                    [id number]

## 2024-02-15 NOTE — PROGRESS NOTES
02/14/24 2110   Treatment   Treatment Type SLED   Treatment Status Restart   Dialysis Machine Number [id number]   Dialyzer Time (hours) 0   BVP (Liters) 0 L   Solutions Labeled and Current  Yes   Access Temporary Cath;Left;IJ   Catheter Dressing Intact  Yes   Alarms Engaged Yes   CRRT Comments   (CRRT/SLED new start as ordered.)   Prescription   Time (Hours) 10   Dialysate K + (mEq/L) 4   Dialysate CA + (mEq/L) 2.25   Dialysate HCO3 - (Bicarb) (mEq/L) 30   Dialysate Na + (mEq/L) 138   Cartridge Type Other  (R300)   Dialysate Flow Rate (mL/min) 200   UF Goal Rate 350 mL/hr     SLED started via LEFT IJ temp cath using aseptic technique. Treatment running well.

## 2024-02-15 NOTE — PLAN OF CARE
Recommendations     1) Resume TF once able postop- Novasource renal @ 30 mL/hr to provide 1440 kcal, 65g PRO, and 516ml FF- FWF per MD      2) If dialysis resumes, recommend changing formula to higher PRO formula - Impact Peptide 1.5 at goal rate of 40ml/hr to provide 1440 kcal, 90g PRO, and 739ml FF (100% of EEN and 79% of EPN)     3) RD team to monitor tolerance, skin, labs, wt and follow-up as needed     Goals: Meet % EEN/EPN by follow-up date.  Nutrition Goal Status: progressing towards goal  Communication of RD Recs:  (POC)

## 2024-02-16 LAB
ALBUMIN SERPL BCP-MCNC: 1.7 G/DL (ref 3.5–5.2)
ALBUMIN SERPL BCP-MCNC: 1.8 G/DL (ref 3.5–5.2)
ALBUMIN SERPL BCP-MCNC: 1.8 G/DL (ref 3.5–5.2)
ALP SERPL-CCNC: 101 U/L (ref 55–135)
ALT SERPL W/O P-5'-P-CCNC: 9 U/L (ref 10–44)
ANION GAP SERPL CALC-SCNC: 7 MMOL/L (ref 8–16)
ANION GAP SERPL CALC-SCNC: 7 MMOL/L (ref 8–16)
ANION GAP SERPL CALC-SCNC: 8 MMOL/L (ref 8–16)
AST SERPL-CCNC: 25 U/L (ref 10–40)
BACTERIA #/AREA URNS AUTO: ABNORMAL /HPF
BASOPHILS # BLD AUTO: 0.04 K/UL (ref 0–0.2)
BASOPHILS # BLD AUTO: 0.06 K/UL (ref 0–0.2)
BASOPHILS NFR BLD: 0.5 % (ref 0–1.9)
BASOPHILS NFR BLD: 0.5 % (ref 0–1.9)
BILIRUB SERPL-MCNC: 0.2 MG/DL (ref 0.1–1)
BILIRUB UR QL STRIP: NEGATIVE
BLD PROD TYP BPU: NORMAL
BLOOD UNIT EXPIRATION DATE: NORMAL
BLOOD UNIT TYPE CODE: 1700
BLOOD UNIT TYPE: NORMAL
BUN SERPL-MCNC: 27 MG/DL (ref 6–20)
BUN SERPL-MCNC: 27 MG/DL (ref 6–20)
BUN SERPL-MCNC: 30 MG/DL (ref 6–20)
BUN SERPL-MCNC: 32 MG/DL (ref 6–20)
BUN SERPL-MCNC: 34 MG/DL (ref 6–20)
BUN SERPL-MCNC: 34 MG/DL (ref 6–20)
CA-I BLDV-SCNC: 1.08 MMOL/L (ref 1.06–1.42)
CA-I BLDV-SCNC: 1.09 MMOL/L (ref 1.06–1.42)
CA-I BLDV-SCNC: 1.12 MMOL/L (ref 1.06–1.42)
CALCIUM SERPL-MCNC: 8.2 MG/DL (ref 8.7–10.5)
CALCIUM SERPL-MCNC: 8.3 MG/DL (ref 8.7–10.5)
CALCIUM SERPL-MCNC: 8.3 MG/DL (ref 8.7–10.5)
CALCIUM SERPL-MCNC: 8.4 MG/DL (ref 8.7–10.5)
CHLORIDE SERPL-SCNC: 104 MMOL/L (ref 95–110)
CHLORIDE SERPL-SCNC: 104 MMOL/L (ref 95–110)
CHLORIDE SERPL-SCNC: 105 MMOL/L (ref 95–110)
CLARITY CSF: CLEAR
CLARITY UR REFRACT.AUTO: ABNORMAL
CO2 SERPL-SCNC: 22 MMOL/L (ref 23–29)
CO2 SERPL-SCNC: 23 MMOL/L (ref 23–29)
CODING SYSTEM: NORMAL
COLOR CSF: COLORLESS
COLOR UR AUTO: YELLOW
CREAT SERPL-MCNC: 2.5 MG/DL (ref 0.5–1.4)
CREAT SERPL-MCNC: 2.5 MG/DL (ref 0.5–1.4)
CREAT SERPL-MCNC: 2.6 MG/DL (ref 0.5–1.4)
CREAT SERPL-MCNC: 2.8 MG/DL (ref 0.5–1.4)
CREAT SERPL-MCNC: 2.9 MG/DL (ref 0.5–1.4)
CREAT SERPL-MCNC: 2.9 MG/DL (ref 0.5–1.4)
CROSSMATCH INTERPRETATION: NORMAL
CSF TUBE NUMBER: 1
CSF TUBE NUMBER: 1
CSF, COMMENT: ABNORMAL
DIFFERENTIAL METHOD BLD: ABNORMAL
DIFFERENTIAL METHOD BLD: ABNORMAL
DISPENSE STATUS: NORMAL
EOSINOPHIL # BLD AUTO: 0.4 K/UL (ref 0–0.5)
EOSINOPHIL # BLD AUTO: 0.5 K/UL (ref 0–0.5)
EOSINOPHIL NFR BLD: 3.6 % (ref 0–8)
EOSINOPHIL NFR BLD: 5.5 % (ref 0–8)
ERYTHROCYTE [DISTWIDTH] IN BLOOD BY AUTOMATED COUNT: 17.2 % (ref 11.5–14.5)
ERYTHROCYTE [DISTWIDTH] IN BLOOD BY AUTOMATED COUNT: 18 % (ref 11.5–14.5)
EST. GFR  (NO RACE VARIABLE): 18.8 ML/MIN/1.73 M^2
EST. GFR  (NO RACE VARIABLE): 18.8 ML/MIN/1.73 M^2
EST. GFR  (NO RACE VARIABLE): 19.6 ML/MIN/1.73 M^2
EST. GFR  (NO RACE VARIABLE): 21.4 ML/MIN/1.73 M^2
EST. GFR  (NO RACE VARIABLE): 22.4 ML/MIN/1.73 M^2
EST. GFR  (NO RACE VARIABLE): 22.4 ML/MIN/1.73 M^2
GLUCOSE CSF-MCNC: 63 MG/DL (ref 40–70)
GLUCOSE SERPL-MCNC: 116 MG/DL (ref 70–110)
GLUCOSE SERPL-MCNC: 116 MG/DL (ref 70–110)
GLUCOSE SERPL-MCNC: 121 MG/DL (ref 70–110)
GLUCOSE SERPL-MCNC: 125 MG/DL (ref 70–110)
GLUCOSE SERPL-MCNC: 134 MG/DL (ref 70–110)
GLUCOSE SERPL-MCNC: 134 MG/DL (ref 70–110)
GLUCOSE UR QL STRIP: NEGATIVE
HCT VFR BLD AUTO: 21.5 % (ref 37–48.5)
HCT VFR BLD AUTO: 24.1 % (ref 37–48.5)
HGB BLD-MCNC: 6.6 G/DL (ref 12–16)
HGB BLD-MCNC: 7.5 G/DL (ref 12–16)
HGB UR QL STRIP: ABNORMAL
HYALINE CASTS UR QL AUTO: 7 /LPF
IMM GRANULOCYTES # BLD AUTO: 0.05 K/UL (ref 0–0.04)
IMM GRANULOCYTES # BLD AUTO: 0.06 K/UL (ref 0–0.04)
IMM GRANULOCYTES NFR BLD AUTO: 0.5 % (ref 0–0.5)
IMM GRANULOCYTES NFR BLD AUTO: 0.6 % (ref 0–0.5)
KETONES UR QL STRIP: NEGATIVE
LEUKOCYTE ESTERASE UR QL STRIP: ABNORMAL
LYMPHOCYTES # BLD AUTO: 1.6 K/UL (ref 1–4.8)
LYMPHOCYTES # BLD AUTO: 1.8 K/UL (ref 1–4.8)
LYMPHOCYTES NFR BLD: 16.2 % (ref 18–48)
LYMPHOCYTES NFR BLD: 19.4 % (ref 18–48)
LYMPHOCYTES NFR CSF MANUAL: 50 % (ref 40–80)
MAGNESIUM SERPL-MCNC: 1.9 MG/DL (ref 1.6–2.6)
MAGNESIUM SERPL-MCNC: 1.9 MG/DL (ref 1.6–2.6)
MAGNESIUM SERPL-MCNC: 2.1 MG/DL (ref 1.6–2.6)
MAGNESIUM SERPL-MCNC: 2.2 MG/DL (ref 1.6–2.6)
MCH RBC QN AUTO: 26.6 PG (ref 27–31)
MCH RBC QN AUTO: 27.5 PG (ref 27–31)
MCHC RBC AUTO-ENTMCNC: 30.7 G/DL (ref 32–36)
MCHC RBC AUTO-ENTMCNC: 31.1 G/DL (ref 32–36)
MCV RBC AUTO: 87 FL (ref 82–98)
MCV RBC AUTO: 88 FL (ref 82–98)
MICROSCOPIC COMMENT: ABNORMAL
MONOCYTES # BLD AUTO: 1 K/UL (ref 0.3–1)
MONOCYTES # BLD AUTO: 1.4 K/UL (ref 0.3–1)
MONOCYTES NFR BLD: 12.3 % (ref 4–15)
MONOCYTES NFR BLD: 12.6 % (ref 4–15)
MONOS+MACROS NFR CSF MANUAL: 50 % (ref 15–45)
NEUTROPHILS # BLD AUTO: 5 K/UL (ref 1.8–7.7)
NEUTROPHILS # BLD AUTO: 7.6 K/UL (ref 1.8–7.7)
NEUTROPHILS NFR BLD: 61.4 % (ref 38–73)
NEUTROPHILS NFR BLD: 66.9 % (ref 38–73)
NITRITE UR QL STRIP: POSITIVE
NRBC BLD-RTO: 0 /100 WBC
NRBC BLD-RTO: 0 /100 WBC
PH UR STRIP: 5 [PH] (ref 5–8)
PHOSPHATE SERPL-MCNC: 4.7 MG/DL (ref 2.7–4.5)
PHOSPHATE SERPL-MCNC: 4.7 MG/DL (ref 2.7–4.5)
PHOSPHATE SERPL-MCNC: 4.9 MG/DL (ref 2.7–4.5)
PHOSPHATE SERPL-MCNC: 5.7 MG/DL (ref 2.7–4.5)
PHOSPHATE SERPL-MCNC: 5.8 MG/DL (ref 2.7–4.5)
PHOSPHATE SERPL-MCNC: 5.8 MG/DL (ref 2.7–4.5)
PLATELET # BLD AUTO: 295 K/UL (ref 150–450)
PLATELET # BLD AUTO: 312 K/UL (ref 150–450)
PMV BLD AUTO: 9.3 FL (ref 9.2–12.9)
PMV BLD AUTO: 9.8 FL (ref 9.2–12.9)
POCT GLUCOSE: 120 MG/DL (ref 70–110)
POCT GLUCOSE: 124 MG/DL (ref 70–110)
POCT GLUCOSE: 125 MG/DL (ref 70–110)
POCT GLUCOSE: 140 MG/DL (ref 70–110)
POTASSIUM SERPL-SCNC: 4.7 MMOL/L (ref 3.5–5.1)
POTASSIUM SERPL-SCNC: 4.7 MMOL/L (ref 3.5–5.1)
POTASSIUM SERPL-SCNC: 4.8 MMOL/L (ref 3.5–5.1)
POTASSIUM SERPL-SCNC: 4.9 MMOL/L (ref 3.5–5.1)
POTASSIUM SERPL-SCNC: 5.2 MMOL/L (ref 3.5–5.1)
POTASSIUM SERPL-SCNC: 5.2 MMOL/L (ref 3.5–5.1)
PROT CSF-MCNC: 107 MG/DL (ref 15–40)
PROT SERPL-MCNC: 6.8 G/DL (ref 6–8.4)
PROT UR QL STRIP: ABNORMAL
RBC # BLD AUTO: 2.48 M/UL (ref 4–5.4)
RBC # BLD AUTO: 2.73 M/UL (ref 4–5.4)
RBC # CSF: 0 /CU MM
RBC #/AREA URNS AUTO: 21 /HPF (ref 0–4)
SODIUM SERPL-SCNC: 134 MMOL/L (ref 136–145)
SODIUM SERPL-SCNC: 135 MMOL/L (ref 136–145)
SP GR UR STRIP: 1.01 (ref 1–1.03)
SPECIMEN VOL CSF: 1.5 ML
SQUAMOUS #/AREA URNS AUTO: 4 /HPF
TRANS ERYTHROCYTES VOL PATIENT: NORMAL ML
URN SPEC COLLECT METH UR: ABNORMAL
WBC # BLD AUTO: 11.35 K/UL (ref 3.9–12.7)
WBC # BLD AUTO: 8.15 K/UL (ref 3.9–12.7)
WBC # CSF: 8 /CU MM (ref 0–5)
WBC #/AREA URNS AUTO: >100 /HPF (ref 0–5)

## 2024-02-16 PROCEDURE — 63600175 PHARM REV CODE 636 W HCPCS: Mod: JZ,JG

## 2024-02-16 PROCEDURE — 80069 RENAL FUNCTION PANEL: CPT | Mod: 91 | Performed by: STUDENT IN AN ORGANIZED HEALTH CARE EDUCATION/TRAINING PROGRAM

## 2024-02-16 PROCEDURE — 99000 SPECIMEN HANDLING OFFICE-LAB: CPT | Performed by: STUDENT IN AN ORGANIZED HEALTH CARE EDUCATION/TRAINING PROGRAM

## 2024-02-16 PROCEDURE — 82330 ASSAY OF CALCIUM: CPT | Mod: 91 | Performed by: STUDENT IN AN ORGANIZED HEALTH CARE EDUCATION/TRAINING PROGRAM

## 2024-02-16 PROCEDURE — 85025 COMPLETE CBC W/AUTO DIFF WBC: CPT

## 2024-02-16 PROCEDURE — 82945 GLUCOSE OTHER FLUID: CPT

## 2024-02-16 PROCEDURE — 88108 CYTOPATH CONCENTRATE TECH: CPT | Performed by: PATHOLOGY

## 2024-02-16 PROCEDURE — 80053 COMPREHEN METABOLIC PANEL: CPT

## 2024-02-16 PROCEDURE — 009U3ZX DRAINAGE OF SPINAL CANAL, PERCUTANEOUS APPROACH, DIAGNOSTIC: ICD-10-PCS | Performed by: STUDENT IN AN ORGANIZED HEALTH CARE EDUCATION/TRAINING PROGRAM

## 2024-02-16 PROCEDURE — 37000008 HC ANESTHESIA 1ST 15 MINUTES

## 2024-02-16 PROCEDURE — 84157 ASSAY OF PROTEIN OTHER: CPT

## 2024-02-16 PROCEDURE — 86920 COMPATIBILITY TEST SPIN: CPT

## 2024-02-16 PROCEDURE — 20000000 HC ICU ROOM

## 2024-02-16 PROCEDURE — 27000221 HC OXYGEN, UP TO 24 HOURS

## 2024-02-16 PROCEDURE — 83615 LACTATE (LD) (LDH) ENZYME: CPT

## 2024-02-16 PROCEDURE — 84100 ASSAY OF PHOSPHORUS: CPT | Performed by: STUDENT IN AN ORGANIZED HEALTH CARE EDUCATION/TRAINING PROGRAM

## 2024-02-16 PROCEDURE — 63600175 PHARM REV CODE 636 W HCPCS: Performed by: STUDENT IN AN ORGANIZED HEALTH CARE EDUCATION/TRAINING PROGRAM

## 2024-02-16 PROCEDURE — D9220A PRA ANESTHESIA: Mod: ANES,,, | Performed by: SURGERY

## 2024-02-16 PROCEDURE — 25000003 PHARM REV CODE 250

## 2024-02-16 PROCEDURE — P9021 RED BLOOD CELLS UNIT: HCPCS

## 2024-02-16 PROCEDURE — 99233 SBSQ HOSP IP/OBS HIGH 50: CPT | Mod: ,,, | Performed by: PSYCHIATRY & NEUROLOGY

## 2024-02-16 PROCEDURE — 99232 SBSQ HOSP IP/OBS MODERATE 35: CPT | Mod: ,,, | Performed by: NURSE PRACTITIONER

## 2024-02-16 PROCEDURE — 25000003 PHARM REV CODE 250: Performed by: STUDENT IN AN ORGANIZED HEALTH CARE EDUCATION/TRAINING PROGRAM

## 2024-02-16 PROCEDURE — 89051 BODY FLUID CELL COUNT: CPT

## 2024-02-16 PROCEDURE — 83735 ASSAY OF MAGNESIUM: CPT | Performed by: STUDENT IN AN ORGANIZED HEALTH CARE EDUCATION/TRAINING PROGRAM

## 2024-02-16 PROCEDURE — 51702 INSERT TEMP BLADDER CATH: CPT

## 2024-02-16 PROCEDURE — 27100171 HC OXYGEN HIGH FLOW UP TO 24 HOURS

## 2024-02-16 PROCEDURE — 94761 N-INVAS EAR/PLS OXIMETRY MLT: CPT

## 2024-02-16 PROCEDURE — 99900026 HC AIRWAY MAINTENANCE (STAT)

## 2024-02-16 PROCEDURE — 87186 SC STD MICRODIL/AGAR DIL: CPT

## 2024-02-16 PROCEDURE — 87088 URINE BACTERIA CULTURE: CPT

## 2024-02-16 PROCEDURE — 37000009 HC ANESTHESIA EA ADD 15 MINS

## 2024-02-16 PROCEDURE — 99232 SBSQ HOSP IP/OBS MODERATE 35: CPT | Mod: ,,, | Performed by: INTERNAL MEDICINE

## 2024-02-16 PROCEDURE — 94003 VENT MGMT INPAT SUBQ DAY: CPT

## 2024-02-16 PROCEDURE — D9220A PRA ANESTHESIA: Mod: CRNA,,, | Performed by: STUDENT IN AN ORGANIZED HEALTH CARE EDUCATION/TRAINING PROGRAM

## 2024-02-16 PROCEDURE — 80069 RENAL FUNCTION PANEL: CPT | Performed by: STUDENT IN AN ORGANIZED HEALTH CARE EDUCATION/TRAINING PROGRAM

## 2024-02-16 PROCEDURE — 87077 CULTURE AEROBIC IDENTIFY: CPT

## 2024-02-16 PROCEDURE — 63600175 PHARM REV CODE 636 W HCPCS

## 2024-02-16 PROCEDURE — 83735 ASSAY OF MAGNESIUM: CPT | Mod: 91 | Performed by: STUDENT IN AN ORGANIZED HEALTH CARE EDUCATION/TRAINING PROGRAM

## 2024-02-16 PROCEDURE — 87086 URINE CULTURE/COLONY COUNT: CPT

## 2024-02-16 PROCEDURE — 88108 CYTOPATH CONCENTRATE TECH: CPT | Mod: 26,,, | Performed by: PATHOLOGY

## 2024-02-16 PROCEDURE — 99900035 HC TECH TIME PER 15 MIN (STAT)

## 2024-02-16 PROCEDURE — 81001 URINALYSIS AUTO W/SCOPE: CPT

## 2024-02-16 PROCEDURE — 25000242 PHARM REV CODE 250 ALT 637 W/ HCPCS

## 2024-02-16 RX ORDER — ROCURONIUM BROMIDE 10 MG/ML
INJECTION, SOLUTION INTRAVENOUS
Status: DISCONTINUED | OUTPATIENT
Start: 2024-02-16 | End: 2024-02-16

## 2024-02-16 RX ORDER — MIDAZOLAM HYDROCHLORIDE 1 MG/ML
INJECTION, SOLUTION INTRAMUSCULAR; INTRAVENOUS
Status: DISCONTINUED | OUTPATIENT
Start: 2024-02-16 | End: 2024-02-16

## 2024-02-16 RX ORDER — FUROSEMIDE 10 MG/ML
120 INJECTION INTRAMUSCULAR; INTRAVENOUS 3 TIMES DAILY
Status: DISCONTINUED | OUTPATIENT
Start: 2024-02-16 | End: 2024-02-28

## 2024-02-16 RX ORDER — HYDROCODONE BITARTRATE AND ACETAMINOPHEN 500; 5 MG/1; MG/1
TABLET ORAL
Status: DISCONTINUED | OUTPATIENT
Start: 2024-02-16 | End: 2024-02-21

## 2024-02-16 RX ORDER — PROPOFOL 10 MG/ML
VIAL (ML) INTRAVENOUS
Status: DISCONTINUED | OUTPATIENT
Start: 2024-02-16 | End: 2024-02-16

## 2024-02-16 RX ORDER — PHENYLEPHRINE HCL IN 0.9% NACL 1 MG/10 ML
SYRINGE (ML) INTRAVENOUS
Status: DISCONTINUED | OUTPATIENT
Start: 2024-02-16 | End: 2024-02-16

## 2024-02-16 RX ORDER — FENTANYL CITRATE 50 UG/ML
INJECTION, SOLUTION INTRAMUSCULAR; INTRAVENOUS
Status: DISCONTINUED | OUTPATIENT
Start: 2024-02-16 | End: 2024-02-16

## 2024-02-16 RX ORDER — HEPARIN SODIUM 5000 [USP'U]/ML
7500 INJECTION, SOLUTION INTRAVENOUS; SUBCUTANEOUS EVERY 8 HOURS
Status: DISCONTINUED | OUTPATIENT
Start: 2024-02-16 | End: 2024-03-05 | Stop reason: HOSPADM

## 2024-02-16 RX ADMIN — Medication 200 MCG: at 03:02

## 2024-02-16 RX ADMIN — PSYLLIUM HUSK 1 PACKET: 3.4 POWDER ORAL at 08:02

## 2024-02-16 RX ADMIN — MIDAZOLAM HYDROCHLORIDE 2 MG: 1 INJECTION, SOLUTION INTRAMUSCULAR; INTRAVENOUS at 02:02

## 2024-02-16 RX ADMIN — CARVEDILOL 25 MG: 25 TABLET, FILM COATED ORAL at 08:02

## 2024-02-16 RX ADMIN — FUROSEMIDE 120 MG: 10 INJECTION, SOLUTION INTRAVENOUS at 01:02

## 2024-02-16 RX ADMIN — Medication 100 MCG: at 04:02

## 2024-02-16 RX ADMIN — INSULIN DETEMIR 14 UNITS: 100 INJECTION, SOLUTION SUBCUTANEOUS at 09:02

## 2024-02-16 RX ADMIN — METRONIDAZOLE 500 MG: 5 INJECTION, SOLUTION INTRAVENOUS at 03:02

## 2024-02-16 RX ADMIN — FENTANYL CITRATE 50 MCG: 50 INJECTION, SOLUTION INTRAMUSCULAR; INTRAVENOUS at 03:02

## 2024-02-16 RX ADMIN — PROPOFOL 50 MG: 10 INJECTION, EMULSION INTRAVENOUS at 03:02

## 2024-02-16 RX ADMIN — Medication 100 MCG: at 03:02

## 2024-02-16 RX ADMIN — SODIUM CHLORIDE: 0.9 INJECTION, SOLUTION INTRAVENOUS at 03:02

## 2024-02-16 RX ADMIN — FUROSEMIDE 80 MG: 10 INJECTION, SOLUTION INTRAVENOUS at 08:02

## 2024-02-16 RX ADMIN — CARVEDILOL 25 MG: 25 TABLET, FILM COATED ORAL at 09:02

## 2024-02-16 RX ADMIN — ROCURONIUM BROMIDE 30 MG: 10 INJECTION, SOLUTION INTRAVENOUS at 03:02

## 2024-02-16 RX ADMIN — CEFTRIAXONE 2 G: 2 INJECTION, POWDER, FOR SOLUTION INTRAMUSCULAR; INTRAVENOUS at 08:02

## 2024-02-16 RX ADMIN — FAMOTIDINE 20 MG: 20 TABLET, FILM COATED ORAL at 08:02

## 2024-02-16 RX ADMIN — METRONIDAZOLE 500 MG: 5 INJECTION, SOLUTION INTRAVENOUS at 08:02

## 2024-02-16 RX ADMIN — FENTANYL CITRATE 50 MCG: 50 INJECTION, SOLUTION INTRAMUSCULAR; INTRAVENOUS at 04:02

## 2024-02-16 RX ADMIN — METRONIDAZOLE 500 MG: 5 INJECTION, SOLUTION INTRAVENOUS at 12:02

## 2024-02-16 RX ADMIN — AMLODIPINE BESYLATE 10 MG: 10 TABLET ORAL at 08:02

## 2024-02-16 RX ADMIN — INSULIN ASPART 6 UNITS: 100 INJECTION, SOLUTION INTRAVENOUS; SUBCUTANEOUS at 08:02

## 2024-02-16 RX ADMIN — FUROSEMIDE 120 MG: 10 INJECTION, SOLUTION INTRAVENOUS at 09:02

## 2024-02-16 RX ADMIN — PSYLLIUM HUSK 1 PACKET: 3.4 POWDER ORAL at 09:02

## 2024-02-16 RX ADMIN — HEPARIN SODIUM 7500 UNITS: 5000 INJECTION INTRAVENOUS; SUBCUTANEOUS at 09:02

## 2024-02-16 NOTE — NURSING
Easton Katz MD notified of pt drop in H&H from 7.1 & 22.9 to 6.6 & 21.5. x1 unit of PRBCs ordered.

## 2024-02-16 NOTE — TRANSFER OF CARE
"Anesthesia Transfer of Care Note    Patient: Sarah Saravia    Procedure(s) Performed: Procedure(s) (LRB):  Lumbar Puncture (N/A)    Patient location: ICU    Anesthesia Type: general    Transport from OR: Transported from OR intubated on 100% O2 by AMBU with assisted ventilation    Post pain: adequate analgesia    Post assessment: no apparent anesthetic complications    Post vital signs: stable    Level of consciousness: responds to stimulation    Nausea/Vomiting: no nausea/vomiting    Complications: none    Transfer of care protocol was followed      Last vitals: Visit Vitals  /61   Pulse 79   Temp 37 °C (98.6 °F) (Oral)   Resp 17   Ht 5' 5" (1.651 m)   Wt (!) 152.9 kg (337 lb)   LMP 01/01/2024 (Approximate)   SpO2 100%   BMI 56.08 kg/m²     "

## 2024-02-16 NOTE — PLAN OF CARE
[facility] - [facility] Intensive Care  Discharge Reassessment    Primary Care Provider: [facility]    Expected Discharge Date: 2/16/2024    Reassessment (most recent)       Discharge Reassessment - 02/16/24 0941          Discharge Reassessment    Assessment Type Discharge Planning Reassessment     Communicated ZEKE with patient/caregiver Date not available/Unable to determine     Discharge Plan A Long-term acute care facility (LTAC)     Discharge Plan B New Nursing Home placement - group home care facility;Home;Home with family;Home Health;Skilled Nursing Facility     DME Needed Upon Discharge  other (see comments)   TBD    Transition of Care Barriers None     Why the patient remains in the hospital Requires continued medical care                     Per the patient's chart, the patient was recently approved for [place] Medicaid.       Discharge Plan A and Plan B have been determined by review of patient's clinical status, future medical and therapeutic needs, and coverage/benefits for post-acute care in coordination with multidisciplinary team members.     Jahaira Mckeon LMSW  Case Management [facility]

## 2024-02-16 NOTE — SUBJECTIVE & OBJECTIVE
Interval History: NAEON. Afebrile. HDS. Hgb 6.6 this morning. LP unsuccessful yesterday.    Medications:  Continuous Infusions:   dextrose 10 % in water (D10W)       Scheduled Meds:   sodium chloride 0.9%   Intravenous Once    amLODIPine  10 mg Per OG tube Daily    carvediloL  25 mg Per OG tube BID    cefTRIAXone (Rocephin) IV (PEDS and ADULTS)  2 g Intravenous Daily    famotidine  20 mg Per OG tube Daily    furosemide (LASIX) injection  80 mg Intravenous Q12H    insulin aspart U-100  6 Units Subcutaneous Q4H    insulin detemir U-100  14 Units Subcutaneous Daily    metronidazole  500 mg Intravenous Q8H    psyllium husk (aspartame)  1 packet Per OG tube BID    sevelamer carbonate  1.6 g Per OG tube TID WM     PRN Meds:0.9%  NaCl infusion (for blood administration), sodium chloride 0.9%, acetaminophen, albuterol-ipratropium, dextrose 10 % in water (D10W), dextrose 10%, dextrose 10%, glucagon (human recombinant), glucose, glucose, hydrALAZINE, insulin aspart U-100, labetalol, naloxone, ondansetron, oxyCODONE, prochlorperazine, sodium chloride 0.9%, sodium chloride 0.9%, sodium chloride 0.9%     Review of patient's allergies indicates:  No Known Allergies  Objective:     Vital Signs (Most Recent):  Temp: 98 °F (36.7 °C) (02/16/24 0530)  Pulse: 81 (02/16/24 1030)  Resp: 18 (02/16/24 1030)  BP: (!) 150/68 (02/16/24 1030)  SpO2: 100 % (02/16/24 1030) Vital Signs (24h Range):  Temp:  [98 °F (36.7 °C)-99.8 °F (37.7 °C)] 98 °F (36.7 °C)  Pulse:  [78-95] 81  Resp:  [12-27] 18  SpO2:  [100 %] 100 %  BP: (112-150)/(57-83) 150/68     Weight: (!) 152.9 kg (337 lb)  Body mass index is 56.08 kg/m².    Intake/Output - Last 3 Shifts         02/14 0700  02/15 0659 02/15 0700  02/16 0659 02/16 0700 02/17 0659    I.V. (mL/kg) 475.6 (3.1) 38.9 (0.3)     NG/ 360     IV Piggyback 871.4 802.8     Total Intake(mL/kg) 1617 (10.5) 1201.7 (7.9)     Urine (mL/kg/hr) 1045 (0.3) 592 (0.2) 175 (0.3)    Other 1359 474 100    Stool  0     Total  Output 2404 1066 275    Net -787 +135.7 -275           Stool Occurrence  1 x              Physical Exam  Vitals and nursing note reviewed.   Constitutional:       General: She is not in acute distress.     Appearance: She is ill-appearing.   HENT:      Head: Normocephalic and atraumatic.   Eyes:      Extraocular Movements: Extraocular movements intact.      Conjunctiva/sclera: Conjunctivae normal.   Neck:      Comments: Left IJ Trialysis catheter  Cardiovascular:      Rate and Rhythm: Normal rate and regular rhythm.   Pulmonary:      Effort: Pulmonary effort is normal.      Comments: Vent Mode: Spont  Oxygen Concentration (%):  [30] 30  Resp Rate Total:  [15 br/min-28 br/min] 23 br/min  PEEP/CPAP:  [5 cmH20] 5 cmH20  Pressure Support:  [10 cmH20] 10 cmH20  Mean Airway Pressure:  [8.8 osB23-21 cmH20] 8.8 cmH20     Abdominal:      General: There is no distension.      Palpations: Abdomen is soft.      Tenderness: There is no abdominal tenderness.   Genitourinary:     Comments: Glynn in place  Rectal Tube  Skin:     General: Skin is warm and dry.      Comments: Wound vac in place to R thigh/groin to suction   Neurological:      General: No focal deficit present.      Mental Status: She is oriented to person, place, and time.   Psychiatric:         Mood and Affect: Mood normal.         Behavior: Behavior normal.          Significant Labs:  I have reviewed all pertinent lab results within the past 24 hours.  CBC:   Recent Labs   Lab 02/16/24  0907   WBC 8.15   RBC 2.73*   HGB 7.5*   HCT 24.1*      MCV 88   MCH 27.5   MCHC 31.1*     BMP:   Recent Labs   Lab 02/16/24  0314   *   *   K 4.9      CO2 23   BUN 30*   CREATININE 2.6*   CALCIUM 8.4*   MG 2.2       Significant Diagnostics:  I have reviewed all pertinent imaging results/findings within the past 24 hours.

## 2024-02-16 NOTE — PLAN OF CARE
SICU PLAN OF CARE NOTE    Dx: Ayaz's gangrene    Goals of Care: comfort, improved neurological status, wound healing, blood glucose control    Vital Signs (last 12 hours):   Temp:  [98.6 °F (37 °C)-99.8 °F (37.7 °C)]   Pulse:  [82-95]   Resp:  [18-28]   BP: (123-173)/()   SpO2:  [97 %-100 %]      Neuro: Arouses to Voice    Cardiac: NSR    Respiratory: Ventilator    Gtts: MIVF    Urine Output: Urinary Catheter 40-50 cc/hour    Dialysis: will evaluate daily, none for right now.    Drains: NG/OG Tube 0 cc /  shift and Wound Vac, total output 200 / shift    Diet: NPO           Labs/Accuchecks: accu checks q4h    Skin:  All skin remains free from injury.  Patient turned Q2, immerse mattress inflated, and bed working correctly.    Shift Events:  Attempted bedside lumbar puncture, OR for NPWT change.  See flowsheet for further assessment/details.  Family updated on current condition/plan of care, questions answered, and emotional support provided.  MD updated on current condition, vitals, labs, and gtts.  No new orders received, will continue to monitor.

## 2024-02-16 NOTE — ASSESSMENT & PLAN NOTE
53 F Hx obesity currently admitted to surgical ICU as transfer from  for necrotizing fascitis s/p surgical debirdement (1/30/24) and Vascular Neurology consulted for multi territory infarcts as well as abnormal restricted diffusion on L temporal lobe concerning for possible infarct/septic emboli. Patient is currently s/p surgical debridement of R groin nec fasc w + cx proteus as well as s/p wound vac. Gen Neuro originally consulted due to worsening neuro exam on 2/3 and CTH showed  hypoattenuations at left anterior temporal lobe w/encephalomalacia, and bilateral centrum semiovale, left anterior corpus callosum, right caudate, subinsular region. In the interim patient having worsening leukocytosis up to 31K today while on CTX and metronidazole with clear cultures. MRI brain 2/5/24 showed several scattered punctate acute infarcts at BL frontal lobes, centrum semiovale, basal ganglia, corpus callosum, and cerebellar hemispheres as well as focal locunar area of diffusion restriction at L temporal lobe.     MRI/MRA completed, showing left temporal lobe lesion improving and less prominent. MRV was negative for venous sinus thrombosis. At this time, differentials include septic embolic vs inflammatory vs hypercoagulopathy. No new recommendations at this time. Continuing to follow.          Antithrombotics for secondary stroke prevention: Antiplatelets: None: Risk of bleeding and need to rule out mycotic aneurysm     Statins for secondary stroke prevention and hyperlipidemia, if present:   Statins: Atorvastatin- 40 mg daily    Aggressive risk factor modification: HTN, HLD, Obesity, infection      Rehab efforts: The patient has been evaluated by a stroke team provider and the therapy needs have been fully considered based off the presenting complaints and exam findings. The following therapy evaluations are needed:  when able     Diagnostics ordered/pending: LP    VTE prophylaxis: Mechanical prophylaxis: Place SCDs  None:  Reason for No Pharmacological VTE Prophylaxis: Bacterial endocarditis concern     BP parameters: Infarct: normotension

## 2024-02-16 NOTE — PROGRESS NOTES
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 Operative cultures showing P mirabilis. S/P wound VAC exchange on 2/6. S/p wound VAC exchange and debridement on 2/9. Afebrile (last 24 hour Tmax - 99.5) and with down trend in leukocytosis (WBC 2/10 - 15.42).  Managed by SICU.   Recommendations from Infectious Disease included:  Continue ceftriaxone 2 gm IV daily.  Continue metronidazole.   Recommend at least 2 weeks of therapy from 2/9. Anticipated end date: 2/22/24.  Monitor CBC and CMP weekly while on antibiotics.  Re-consult closer to antibiotic end date for further antibiotic recommendations if patient remains stable.  Surgical debridements as indicated for non viable tissue as per Surgical Service.  Discussed management plan with the staff and/or members from SICU and General Surgery service.     Ac isch multi vasc territories stroke  53 F Hx obesity currently admitted to surgical ICU as transfer from  for necrotizing fascitis s/p surgical debirdement (1/30/24) and Vascular Neurology consulted for multi territory infarcts as well as abnormal restricted diffusion on L temporal lobe concerning for possible infarct/septic emboli. Patient is currently s/p surgical debridement of R groin nec fasc w + cx proteus as well as s/p wound vac. Gen Neuro originally consulted due to worsening neuro exam on 2/3 and CTH showed  hypoattenuations at left anterior temporal lobe w/encephalomalacia, and bilateral centrum semiovale, left anterior corpus callosum, right caudate, subinsular region. In the interim patient having worsening leukocytosis up to 31K today while on CTX and metronidazole with clear cultures. MRI brain 2/5/24 showed several scattered punctate acute infarcts at BL frontal lobes, centrum semiovale, basal ganglia, corpus callosum, and  cerebellar hemispheres as well as focal locunar area of diffusion restriction at L temporal lobe.     MRI/MRA completed, showing left temporal lobe lesion improving and less prominent. MRV was negative for venous sinus thrombosis. At this time, differentials include septic embolic vs inflammatory vs hypercoagulopathy. No new recommendations at this time. Continuing to follow.          Antithrombotics for secondary stroke prevention: Antiplatelets: None: Risk of bleeding and need to rule out mycotic aneurysm     Statins for secondary stroke prevention and hyperlipidemia, if present:   Statins: Atorvastatin- 40 mg daily    Aggressive risk factor modification: HTN, HLD, Obesity, infection      Rehab efforts: The patient has been evaluated by a stroke team provider and the therapy needs have been fully considered based off the presenting complaints and exam findings. The following therapy evaluations are needed:  when able     Diagnostics ordered/pending: LP    VTE prophylaxis: Mechanical prophylaxis: Place SCDs  None: Reason for No Pharmacological VTE Prophylaxis: Bacterial endocarditis concern     BP parameters: Infarct: normotension         New onset type 2 diabetes mellitus  Stroke Risk Factor  Endocrine following, recommendations include:  -Continue transition IV insulin infusion at 0.6 u/hr with stepdown parameters   -Continue Novolog 6 units q 4 hrs while on tube feeding (HOLD if tube feeding is stopped or BG < 100)   -Continue Moderate Dose Correction Scale  -BG monitoring q 4 hrs while NPO       Weaning from mechanically assisted ventilation initiated  Managed by SICU    ALVARADO (acute kidney injury)  Followed by Nephrology, recommendations include:  -Plan to hold on RRT while awaiting MRI findings, trial of Lasix 200 mg IV and Diuril 500 mg IV today and assess response as minimal with Lasix 120 mg IV  -Daily RFP   -Strict I&Os  -Avoid nephrotoxins, if possible     Morbid (severe) obesity due to excess  calories  Stroke Risk Factor  BMI 56.58  Educate on diet and exercise for weight loss when appropriate          02/07/2024: Worsening neurological exam (disconjugate gaze, L>R). NIH 30. Stat CTH.   02/10/2024: NIH 30. Remains in NCC. Intubated. MRI/MRA completed, showing left temporal lobe lesion improving and less prominent. MRV was negative for venous sinus thrombosis.   2-16-24 attempted LP with anesthesiology yesterday and unsuccessful x 3 tries , recommend doing under fluoro with IR    STROKE DOCUMENTATION        NIH Scale:  1a. Level of Consciousness: 2-->Not alert, requires repeated stimulation to attend, or is obtunded and requires strong or painful stimulation to make movements (not stereotyped)  1b. LOC Questions: 2-->Answers neither question correctly  1c. LOC Commands: 2-->Performs neither task correctly  2. Best Gaze: 0-->Normal  3. Visual: 0-->No visual loss  4. Facial Palsy: 0-->Normal symmetrical movements  5a. Motor Arm, Left: 4-->No movement  5b. Motor Arm, Right: 4-->No movement  6a. Motor Leg, Left: 4-->No movement  6b. Motor Leg, Right: 4-->No movement  7. Limb Ataxia: 0-->Absent  8. Sensory: 2-->Severe to total sensory loss, patient is not aware of being touched in the face, arm, and leg  9. Best Language: 3-->Mute, global aphasia, no usable speech or auditory comprehension  10. Dysarthria: (UN) Intubated or other physical barrier  11. Extinction and Inattention (formerly Neglect): 2-->Profound lamar-inattention/extinction more than 1 modality  Total (NIH Stroke Scale): 29       Modified Knox    Cece Coma Scale:    ABCD2 Score:    [id number] Score:   HAS -BLED Score:   ICH Score:   Hunt & Miles Classification:      Hemorrhagic change of an Ischemic Stroke: Does this patient have an ischemic stroke with hemorrhagic changes? No     Neurologic Chief Complaint: acute ischemic multi vasc territories stroke    Subjective:     Interval History: Patient is seen for follow-up neurological  assessment and treatment recommendations: attempted LP yesterday and unsuccessful with anesthesia recommend using fluoro with IR    HPI, Past Medical, Family, and Social History remains the same as documented in the initial encounter.     Review of Systems   Unable to perform ROS: Intubated     Scheduled Meds:   sodium chloride 0.9%   Intravenous Once    amLODIPine  10 mg Per OG tube Daily    carvediloL  25 mg Per OG tube BID    cefTRIAXone (Rocephin) IV (PEDS and ADULTS)  2 g Intravenous Daily    famotidine  20 mg Per OG tube Daily    furosemide (LASIX) injection  120 mg Intravenous TID    insulin aspart U-100  6 Units Subcutaneous Q4H    insulin detemir U-100  14 Units Subcutaneous Daily    metronidazole  500 mg Intravenous Q8H    psyllium husk (aspartame)  1 packet Per OG tube BID    sevelamer carbonate  1.6 g Per OG tube TID WM     Continuous Infusions:   dextrose 10 % in water (D10W)       PRN Meds:0.9%  NaCl infusion (for blood administration), sodium chloride 0.9%, acetaminophen, albuterol-ipratropium, dextrose 10 % in water (D10W), dextrose 10%, dextrose 10%, glucagon (human recombinant), glucose, glucose, hydrALAZINE, insulin aspart U-100, labetalol, naloxone, ondansetron, oxyCODONE, prochlorperazine, sodium chloride 0.9%, sodium chloride 0.9%, sodium chloride 0.9%    Objective:     Vital Signs (Most Recent):  Temp: 98.6 °F (37 °C) (02/16/24 1101)  Pulse: 79 (02/16/24 1400)  Resp: 17 (02/16/24 1400)  BP: 127/61 (02/16/24 1345)  SpO2: 100 % (02/16/24 1400)  BP Location: Left forearm    Vital Signs Range (Last 24H):  Temp:  [98 °F (36.7 °C)-99.6 °F (37.6 °C)]   Pulse:  [76-95]   Resp:  [12-27]   BP: (108-150)/(55-74)   SpO2:  [100 %]   BP Location: Left forearm       Physical Exam  Vitals and nursing note reviewed.   Pulmonary:      Comments: Intubated on vent  Skin:     Comments: Abd dressing in place to wound vac   Neurological:      Comments: Wd to painful stimuli only              Neurological Exam:  "  LOC: comatose  Attention Span: comatose  Language: Intubated  Motor: no movement except to WD to pain stimul;i  Tone: Flaccid  LUE , LLE, RUE, and RLE     Laboratory:  CMP:   Recent Labs   Lab 02/16/24  0314 02/16/24  1321   CALCIUM 8.4* 8.2*   ALBUMIN 1.7* 1.7*   PROT 6.8  --    * 134*   K 4.9 4.8   CO2 23 22*    104   BUN 30* 32*   CREATININE 2.6* 2.8*   ALKPHOS 101  --    ALT 9*  --    AST 25  --    BILITOT 0.2  --      CBC:   Recent Labs   Lab 02/16/24  0907   WBC 8.15   RBC 2.73*   HGB 7.5*   HCT 24.1*      MCV 88   MCH 27.5   MCHC 31.1*     Lipid Panel: No results for input(s): "CHOL", "LDLCALC", "HDL", "TRIG" in the last 168 hours.  Coagulation:   Recent Labs   Lab 02/15/24  1429   APTT 26.2     Platelet Aggregation Study: No results for input(s): "PLTAGG", "PLTAGINTERP", "PLTAGREGLACO", "ADPPLTAGGREG" in the last 168 hours.  Hgb A1C: No results for input(s): "HGBA1C" in the last 168 hours.  TSH: No results for input(s): "TSH" in the last 168 hours.    Diagnostic Results     Brain Imaging   MRI Brain w/o contrast 2-6-24 results:    Several scattered punctate acute infarcts throughout the bilateral frontal lobes, centrum semiovale, basal ganglia, corpus callosum, and cerebellar hemispheres.  Overall distribution is concerning for embolic etiology (septic emboli to be considered in light of clinical history of infection).     Please note this is somewhat confounded by larger focal locular area of diffusion restriction within the left temporal lobe anteriorly which is prominently involving the subcortical white matter.  While this may be unusual possible venous additional area of acute infarction sequela of tumefactive demyelination a consideration with infectious process with encephalitis/abscess not excluded.    MRI MRA Brain and MRV brain 2-10-24 results:  Similar appearance of the brain when compared to the prior study with no new lesion identified.  No midline shift herniation or " intracranial hemorrhage.  The left temporal lobe lesion appears improved and less prominent than the prior study.     It is unclear whether these lesions represent embolic infarcts, or underlying infectious/inflammatory process including cerebritis/encephalitis or demyelinating disease.     No advanced major branch stenosis/occlusion at the [id number].     No evidence of venous sinus thrombosis.    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 Please see the MR imaging study of the brain and MRA as the studies were dictated together.     Vessel Imaging   CTA Head and neck 2-6-24 results:  CTA head: Absent visualization left V4 segment vertebral artery which may be severely hypoplastic or absent.  Underlying vertebral artery occlusion felt less likely.     Atherosclerotic plaquing cavernous segments of the ICAs with mild moderate narrowing.     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     CTA neck: Atherosclerotic plaquing of the carotid bifurcations and proximal ICAs with less than 50% proximal ICA stenosis by NASCET criteria.     Severe motion distortion of the origin great vessels leading origin of the vertebral arteries from the subclavian arteries.     Degenerative changes cervical spine with superimposed ossification posterior longitudinal ligament proximal cervical canal with at least moderate spinal canal stenosis.     CT head: Evolving hypodensities within the centrum semiovale, frontal and parietal lobes as well as the corpus callosum with more prominent hypodense focus in the left temporal lobe corresponds to abnormality seen on MRI.  These may be areas of acute/recent infarction with septic emboli to be considered.     Allowing for CT technique there is no definite associated abnormal  parenchymal enhancement to suggest abscess or tumefactive demyelination.     Clinical correlation and follow-up MRI advised    Cardiac Imaging   2D Echo 2-6-24 results:    Left Ventricle: The left ventricle is normal in size. Normal wall thickness. There is concentric remodeling. Normal wall motion. There is normal systolic function with a visually estimated ejection fraction of 60 - 65%. There is normal diastolic function. Normal left ventricular filling pressure.    Right Ventricle: Normal right ventricular cavity size. Wall thickness is normal. Right ventricle wall motion  is normal. Systolic function is normal.    Aorta: Aortic root is normal in size measuring 2.79 cm. Ascending aorta is normal measuring 2.57 cm.    IVC/SVC: Normal venous pressure at 3 mmHg.    Trish Burger, NP  [facility]  Department of Vascular Neurology   [facility]

## 2024-02-16 NOTE — ASSESSMENT & PLAN NOTE
53 y.o. 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     - Tentative plan for OR on Monday for wound vac change  - Last WV change 2/15  - Will discuss plans for trach/PEG/permacath/stoma in the setting of fevers. No fevers in 24 hours. Likely plan for next week pending family discussions and LP results  - LP today - FUP results   - Palliative following given overall poor prognosis, daughter would like everything done; MRI slightly improved but no changes to patient's neuro status  - Daily SBTs  - Okay for DVT ppx   - Remainder of care per SICU

## 2024-02-16 NOTE — PROGRESS NOTES
"[facility]  Endocrinology  Progress Note    Admit Date: 1/29/2024     Reason for Consult: Management of T2DM, Hyperglycemia     Surgical Procedure and Date: n/a    Diabetes diagnosis year: new onset     Home Diabetes Medications:  none per chart review and family present at bedside     Lab Results   Component Value Date    HGBA1C 10.4 (H) 01/30/2024       Diabetes Complications include:     Hyperglycemia, DKA at OSH     Complicating diabetes co morbidities:   Obesity       HPI:   Patient is a 53 y.o. female with a diagnosis of obesity (BMI 53) admitted with N/V and R groin wound found to be in DKA at OSH. 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 While at OSH pt developed acute respiratory failure requiring intubation. Pulmonology was consulted for ventilator management and critical illness. Nephrology was consulted for worsening vishal in the setting of lactic acidosis and septic shock likely ATN, sCr elevated at 3.8 on admit now 4.0 post HD. Pt being admitted to SICU for surgical critical care management. Immediate plans include hemodynamic stabilization, weaning of respiratory support, and RRT.  Endocrinology consulted for management of T2DM.                 Interval HPI:   Overnight events: Remains in SICU. Intubated. BG well controlled on current SQ insulin regimen. Creatinine 2.6. Tube feeds off.   Eating:   NPO  Nausea: No  Hypoglycemia and intervention: No  Fever: No  TPN and/or TF: Yes  If yes, type of TF/TPN and rate: Tube feeds at 30 ml/hr (on hold)     /69   Pulse 80   Temp 98 °F (36.7 °C) (Oral)   Resp 16   Ht 5' 5" (1.651 m)   Wt (!) 152.9 kg (337 lb)   LMP 01/01/2024 (Approximate) Comment: tubal ligation  SpO2 100%   BMI 56.08 kg/m²     Labs Reviewed and Include    Recent Labs   Lab 02/16/24  0314   *   CALCIUM 8.4*   ALBUMIN 1.7*   PROT 6.8   *   K 4.9 " "  CO2 23      BUN 30*   CREATININE 2.6*   ALKPHOS 101   ALT 9*   AST 25   BILITOT 0.2     Lab Results   Component Value Date    WBC 11.35 02/16/2024    HGB 6.6 (L) 02/16/2024    HCT 21.5 (L) 02/16/2024    MCV 87 02/16/2024     02/16/2024     No results for input(s): "TSH", "FREET4" in the last 168 hours.  Lab Results   Component Value Date    HGBA1C 10.4 (H) 01/30/2024       Nutritional status:   Body mass index is 56.08 kg/m².  Lab Results   Component Value Date    ALBUMIN 1.7 (L) 02/16/2024    ALBUMIN 1.7 (L) 02/15/2024    ALBUMIN 1.7 (L) 02/15/2024     No results found for: "PREALBUMIN"    Estimated Creatinine Clearance: 37.7 mL/min (A) (based on SCr of 2.6 mg/dL (H)).    Accu-Checks  Recent Labs     02/14/24  2005 02/14/24  2349 02/15/24  0417 02/15/24  1008 02/15/24  1650 02/15/24  2003 02/15/24  2351 02/16/24  0432 02/16/24  0743 02/16/24  0902   POCTGLUCOSE 165* 155* 128* 110 138* 144* 152* 120* 125* 124*       Current Medications and/or Treatments Impacting Glycemic Control  Immunotherapy:    Immunosuppressants       None          Steroids:   Hormones (From admission, onward)      None          Pressors:    Autonomic Drugs (From admission, onward)      None          Hyperglycemia/Diabetes Medications:   Antihyperglycemics (From admission, onward)      Start     Stop Route Frequency Ordered    02/13/24 0915  insulin detemir U-100 (Levemir) pen 14 Units         -- SubQ Daily 02/13/24 0906    02/09/24 1200  insulin aspart U-100 pen 6 Units         -- SubQ Every 4 hours 02/09/24 0901    02/03/24 1010  insulin aspart U-100 pen 0-10 Units         -- SubQ Every 4 hours PRN 02/03/24 0911            ASSESSMENT and PLAN    Renal/  * Ayaz's gangrene  Managed per primary team  Optimize BG control        ALVARADO (acute kidney injury)  Lab Results   Component Value Date    CREATININE 2.6 (H) 02/16/2024     Avoid insulin stacking  Titrate insulin slowly       Endocrine  Morbid (severe) obesity due to excess " calories  Body mass index is 56.08 kg/m².  May increase insulin resistance.         New onset type 2 diabetes mellitus  BG goal 140-180  No previous hx of T2DM per family at bedside. A1c of 10.4. DKA at OSH. TF off. BG stable on regimen.      Plan:  - Continue Levemir 14 units daily.  - Continue Novolog 6 units q 4 hrs while on tube feeding (HOLD if tube feeding is stopped or BG < 100)   - Continue Moderate Dose Correction Scale  - BG monitoring q 4 hrs while NPO /TF    ** Please call Endocrine for any BG related issues **      Discharge plans: TBD    Lab Results   Component Value Date    HGBA1C 10.4 (H) 01/30/2024             Zoie Muñiz, NP  Endocrinology  [facility]

## 2024-02-16 NOTE — SUBJECTIVE & OBJECTIVE
Neurologic Chief Complaint: acute ischemic multi vasc territories stroke    Subjective:     Interval History: Patient is seen for follow-up neurological assessment and treatment recommendations: attempted LP yesterday and unsuccessful with anesthesia recommend using fluoro with IR    HPI, Past Medical, Family, and Social History remains the same as documented in the initial encounter.     Review of Systems   Unable to perform ROS: Intubated     Scheduled Meds:   sodium chloride 0.9%   Intravenous Once    amLODIPine  10 mg Per OG tube Daily    carvediloL  25 mg Per OG tube BID    cefTRIAXone (Rocephin) IV (PEDS and ADULTS)  2 g Intravenous Daily    famotidine  20 mg Per OG tube Daily    furosemide (LASIX) injection  120 mg Intravenous TID    insulin aspart U-100  6 Units Subcutaneous Q4H    insulin detemir U-100  14 Units Subcutaneous Daily    metronidazole  500 mg Intravenous Q8H    psyllium husk (aspartame)  1 packet Per OG tube BID    sevelamer carbonate  1.6 g Per OG tube TID WM     Continuous Infusions:   dextrose 10 % in water (D10W)       PRN Meds:0.9%  NaCl infusion (for blood administration), sodium chloride 0.9%, acetaminophen, albuterol-ipratropium, dextrose 10 % in water (D10W), dextrose 10%, dextrose 10%, glucagon (human recombinant), glucose, glucose, hydrALAZINE, insulin aspart U-100, labetalol, naloxone, ondansetron, oxyCODONE, prochlorperazine, sodium chloride 0.9%, sodium chloride 0.9%, sodium chloride 0.9%    Objective:     Vital Signs (Most Recent):  Temp: 98.6 °F (37 °C) (02/16/24 1101)  Pulse: 79 (02/16/24 1400)  Resp: 17 (02/16/24 1400)  BP: 127/61 (02/16/24 1345)  SpO2: 100 % (02/16/24 1400)  BP Location: Left forearm    Vital Signs Range (Last 24H):  Temp:  [98 °F (36.7 °C)-99.6 °F (37.6 °C)]   Pulse:  [76-95]   Resp:  [12-27]   BP: (108-150)/(55-74)   SpO2:  [100 %]   BP Location: Left forearm       Physical Exam  Vitals and nursing note reviewed.   Pulmonary:      Comments: Intubated on  "vent  Skin:     Comments: Abd dressing in place to wound vac   Neurological:      Comments: Wd to painful stimuli only              Neurological Exam:   LOC: comatose  Attention Span: comatose  Language: Intubated  Motor: no movement except to WD to pain stimul;i  Tone: Flaccid  LUE , LLE, RUE, and RLE     Laboratory:  CMP:   Recent Labs   Lab 02/16/24  0314 02/16/24  1321   CALCIUM 8.4* 8.2*   ALBUMIN 1.7* 1.7*   PROT 6.8  --    * 134*   K 4.9 4.8   CO2 23 22*    104   BUN 30* 32*   CREATININE 2.6* 2.8*   ALKPHOS 101  --    ALT 9*  --    AST 25  --    BILITOT 0.2  --      CBC:   Recent Labs   Lab 02/16/24  0907   WBC 8.15   RBC 2.73*   HGB 7.5*   HCT 24.1*      MCV 88   MCH 27.5   MCHC 31.1*     Lipid Panel: No results for input(s): "CHOL", "LDLCALC", "HDL", "TRIG" in the last 168 hours.  Coagulation:   Recent Labs   Lab 02/15/24  1429   APTT 26.2     Platelet Aggregation Study: No results for input(s): "PLTAGG", "PLTAGINTERP", "PLTAGREGLACO", "ADPPLTAGGREG" in the last 168 hours.  Hgb A1C: No results for input(s): "HGBA1C" in the last 168 hours.  TSH: No results for input(s): "TSH" in the last 168 hours.    Diagnostic Results     Brain Imaging   MRI Brain w/o contrast 2-6-24 results:    Several scattered punctate acute infarcts throughout the bilateral frontal lobes, centrum semiovale, basal ganglia, corpus callosum, and cerebellar hemispheres.  Overall distribution is concerning for embolic etiology (septic emboli to be considered in light of clinical history of infection).     Please note this is somewhat confounded by larger focal locular area of diffusion restriction within the left temporal lobe anteriorly which is prominently involving the subcortical white matter.  While this may be unusual possible venous additional area of acute infarction sequela of tumefactive demyelination a consideration with infectious process with encephalitis/abscess not excluded.    MRI MRA Brain and MRV brain " 2-10-24 results:  Similar appearance of the brain when compared to the prior study with no new lesion identified.  No midline shift herniation or intracranial hemorrhage.  The left temporal lobe lesion appears improved and less prominent than the prior study.     It is unclear whether these lesions represent embolic infarcts, or underlying infectious/inflammatory process including cerebritis/encephalitis or demyelinating disease.     No advanced major branch stenosis/occlusion at the [id number].     No evidence of venous sinus thrombosis.    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 Please see the MR imaging study of the brain and MRA as the studies were dictated together.     Vessel Imaging   CTA Head and neck 2-6-24 results:  CTA head: Absent visualization left V4 segment vertebral artery which may be severely hypoplastic or absent.  Underlying vertebral artery occlusion felt less likely.     Atherosclerotic plaquing cavernous segments of the ICAs with mild moderate narrowing.     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     CTA neck: Atherosclerotic plaquing of the carotid bifurcations and proximal ICAs with less than 50% proximal ICA stenosis by NASCET criteria.     Severe motion distortion of the origin great vessels leading origin of the vertebral arteries from the subclavian arteries.     Degenerative changes cervical spine with superimposed ossification posterior longitudinal ligament proximal cervical canal with at least moderate spinal canal stenosis.     CT head: Evolving hypodensities within the centrum semiovale, frontal and parietal lobes as well as the corpus callosum with more prominent hypodense focus in the left temporal lobe corresponds to abnormality seen on MRI.  These  may be areas of acute/recent infarction with septic emboli to be considered.     Allowing for CT technique there is no definite associated abnormal parenchymal enhancement to suggest abscess or tumefactive demyelination.     Clinical correlation and follow-up MRI advised    Cardiac Imaging   2D Echo 2-6-24 results:    Left Ventricle: The left ventricle is normal in size. Normal wall thickness. There is concentric remodeling. Normal wall motion. There is normal systolic function with a visually estimated ejection fraction of 60 - 65%. There is normal diastolic function. Normal left ventricular filling pressure.    Right Ventricle: Normal right ventricular cavity size. Wall thickness is normal. Right ventricle wall motion  is normal. Systolic function is normal.    Aorta: Aortic root is normal in size measuring 2.79 cm. Ascending aorta is normal measuring 2.57 cm.    IVC/SVC: Normal venous pressure at 3 mmHg.

## 2024-02-16 NOTE — PROGRESS NOTES
[facility]  General Surgery  Progress Note    Subjective:     History of Present Illness:  53 year old morbidly obese female who does not routinely go to the doctor presents to the ED with malaise, nausea, vomiting, and groin, buttock, perineal swelling and tenderness. She began feeling ill a few days ago.     On arrival to the ED she is tachycardic to 120, hypertensive without fever. Labs demonstrate leukocytosis of 21, , with lactic acidosis and associated ALVARADO with cr 3.8, hyperglycemia with blood glucose of 824 accompanied by severe electrolyte derangements. CT imaging obtained demonstrates diffuse subcutaneous air along buttocks, perineum, anterior vulva, as well as bilateral thighs.     No prior abdominal surgeries. She denies problems with her heart or lungs. Non smoker. Does not routinely take any medications.    Post-Op Info:  Procedure(s) (LRB):  REPLACEMENT, WOUND VAC (N/A)   1 Day Post-Op     Interval History: NAEON. Afebrile. HDS. Hgb 6.6 this morning. LP unsuccessful yesterday.    Medications:  Continuous Infusions:   dextrose 10 % in water (D10W)       Scheduled Meds:   sodium chloride 0.9%   Intravenous Once    amLODIPine  10 mg Per OG tube Daily    carvediloL  25 mg Per OG tube BID    cefTRIAXone (Rocephin) IV (PEDS and ADULTS)  2 g Intravenous Daily    famotidine  20 mg Per OG tube Daily    furosemide (LASIX) injection  80 mg Intravenous Q12H    insulin aspart U-100  6 Units Subcutaneous Q4H    insulin detemir U-100  14 Units Subcutaneous Daily    metronidazole  500 mg Intravenous Q8H    psyllium husk (aspartame)  1 packet Per OG tube BID    sevelamer carbonate  1.6 g Per OG tube TID WM     PRN Meds:0.9%  NaCl infusion (for blood administration), sodium chloride 0.9%, acetaminophen, albuterol-ipratropium, dextrose 10 % in water (D10W), dextrose 10%, dextrose 10%, glucagon (human recombinant), glucose, glucose, hydrALAZINE, insulin aspart U-100, labetalol, naloxone,  ondansetron, oxyCODONE, prochlorperazine, sodium chloride 0.9%, sodium chloride 0.9%, sodium chloride 0.9%     Review of patient's allergies indicates:  No Known Allergies  Objective:     Vital Signs (Most Recent):  Temp: 98 °F (36.7 °C) (02/16/24 0530)  Pulse: 81 (02/16/24 1030)  Resp: 18 (02/16/24 1030)  BP: (!) 150/68 (02/16/24 1030)  SpO2: 100 % (02/16/24 1030) Vital Signs (24h Range):  Temp:  [98 °F (36.7 °C)-99.8 °F (37.7 °C)] 98 °F (36.7 °C)  Pulse:  [78-95] 81  Resp:  [12-27] 18  SpO2:  [100 %] 100 %  BP: (112-150)/(57-83) 150/68     Weight: (!) 152.9 kg (337 lb)  Body mass index is 56.08 kg/m².    Intake/Output - Last 3 Shifts         02/14 0700  02/15 0659 02/15 0700  02/16 0659 02/16 0700  02/17 0659    I.V. (mL/kg) 475.6 (3.1) 38.9 (0.3)     NG/ 360     IV Piggyback 871.4 802.8     Total Intake(mL/kg) 1617 (10.5) 1201.7 (7.9)     Urine (mL/kg/hr) 1045 (0.3) 592 (0.2) 175 (0.3)    Other 1359 474 100    Stool  0     Total Output 2404 1066 275    Net -787 +135.7 -275           Stool Occurrence  1 x              Physical Exam  Vitals and nursing note reviewed.   Constitutional:       General: She is not in acute distress.     Appearance: She is ill-appearing.   HENT:      Head: Normocephalic and atraumatic.   Eyes:      Extraocular Movements: Extraocular movements intact.      Conjunctiva/sclera: Conjunctivae normal.   Neck:      Comments: Left IJ Trialysis catheter  Cardiovascular:      Rate and Rhythm: Normal rate and regular rhythm.   Pulmonary:      Effort: Pulmonary effort is normal.      Comments: Vent Mode: Spont  Oxygen Concentration (%):  [30] 30  Resp Rate Total:  [15 br/min-28 br/min] 23 br/min  PEEP/CPAP:  [5 cmH20] 5 cmH20  Pressure Support:  [10 cmH20] 10 cmH20  Mean Airway Pressure:  [8.8 qkO03-41 cmH20] 8.8 cmH20     Abdominal:      General: There is no distension.      Palpations: Abdomen is soft.      Tenderness: There is no abdominal tenderness.   Genitourinary:     Comments: Glynn  in place  Rectal Tube  Skin:     General: Skin is warm and dry.      Comments: Wound vac in place to R thigh/groin to suction   Neurological:      General: No focal deficit present.      Mental Status: She is oriented to person, place, and time.   Psychiatric:         Mood and Affect: Mood normal.         Behavior: Behavior normal.          Significant Labs:  I have reviewed all pertinent lab results within the past 24 hours.  CBC:   Recent Labs   Lab 02/16/24  0907   WBC 8.15   RBC 2.73*   HGB 7.5*   HCT 24.1*      MCV 88   MCH 27.5   MCHC 31.1*     BMP:   Recent Labs   Lab 02/16/24  0314   *   *   K 4.9      CO2 23   BUN 30*   CREATININE 2.6*   CALCIUM 8.4*   MG 2.2       Significant Diagnostics:  I have reviewed all pertinent imaging results/findings within the past 24 hours.  Assessment/Plan:     * Ayaz's gangrene  53 y.o. 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     - Tentative plan for OR on Monday for wound vac change  - Last [place] change 2/15  - Will discuss plans for trach/PEG/permacath/stoma in the setting of fevers. No fevers in 24 hours. Likely plan for next week pending family discussions and LP results  - LP today - FUP results   - Palliative following given overall poor prognosis, daughter would like everything done; MRI slightly improved but no changes to patient's neuro status  - Daily SBTs  - Okay for DVT ppx   - Remainder of care per SICU        Jean Claude Lara MD  General Surgery  [facility]

## 2024-02-16 NOTE — ANESTHESIA POSTPROCEDURE EVALUATION
Anesthesia Post Evaluation    Patient: Sarah Saravia    Procedure(s) Performed: Procedure(s) (LRB):  Lumbar Puncture (N/A)    Final Anesthesia Type: general      Patient location during evaluation: ICU  Patient participation: No - Unable to Participate, Intubation  Level of consciousness: sedated  Post-procedure vital signs: reviewed and stable  Airway patency: patent      Anesthetic complications: no      Cardiovascular status: blood pressure returned to baseline and hemodynamically stable  Respiratory status: ventilator and intubated  Hydration status: euvolemic  Follow-up not needed.              Vitals Value Taken Time   /79 02/16/24 1700   Temp 37 °C (98.6 °F) 02/16/24 1101   Pulse 80 02/16/24 1705   Resp 14 02/16/24 1705   SpO2 100 % 02/16/24 1705   Vitals shown include unvalidated device data.      No case tracking events are documented in the log.      Pain/Lucía Score: No data recorded         Patient Instructions by Linsey Cartagena PA-C at 5/10/2019  1:40 PM     Author: Linsey Cartagena PA-C Service: -- Author Type: Physician Assistant    Filed: 5/10/2019  2:08 PM Encounter Date: 5/10/2019 Status: Signed    : Linsey Cartagena PA-C (Physician Assistant)           Patient Education             [facility] Parent Handout   6 Month Visit  Here are some suggestions from [facility] experts that may be of value to your family.     Feeding Your Baby    Most babies have doubled their birth weight.    Your babys growth will slow down.    If you are still breastfeeding, thats great! Continue as long as you both like.    If you are formula feeding, use an iron-fortified formula.    You may begin to feed your baby solid food when your baby is ready.    Some of the signs your baby is ready for solids    Opens mouth for the spoon.    Sits with support.    Good head and neck control.    Interest in foods you eat.   Starting New Foods    Introduce new foods one at a time.    Iron-fortified cereal    Good sources of iron include    Red meat    Introduce fruits and vegetables after your baby eats iron-fortified cereal or pureed meats well.    Offer 1-2 tablespoons of solid food 2-3 times per day.    Avoid feeding your baby too much by following the babys signs of fullness.    Leaning back    Turning away    Do not force your baby to eat or finish foods.    It may take 10-15 times of giving your baby a food to try before she will like it.    Avoid foods that can cause allergies-- peanuts, tree nuts, fish, and shellfish.    To prevent choking    Only give your baby very soft, small bites of finger foods.    Keep small objects and plastic bags away from your baby.  How Your Family Is Doing    Call on others for help.    Encourage your partner to help care for your baby.    Ask us about helpful resources if you are alone.    Invite friends over or join a parent group.   Choose a mature, trained,  and responsible  or caregiver.    You can talk with us about your  choices.  Healthy Teeth    Many babies begin to cut teeth.    Use a soft cloth or toothbrush to clean each tooth with water only as it comes in.    Ask us about the need for fluoride.    Do not give a bottle in bed.    Do not prop the bottle.    Have regular times for your baby to eat. Do not let him eat all day.  Your Babys Development    Place your baby so she is sitting up and can look around.    Talk with your baby by copying the sounds your baby makes.    Look at and read books together.    Play games such as Incentive, clarisa-cake, and so big.    Offer active play with mirrors, floor gyms, and colorful toys to hold.    If your baby is fussy, give her safe toys to hold and put in her mouth and make sure she is getting regular naps and playtimes.  Crib/Playpen    Put your baby to sleep on her back.    In a crib that meets current safety standards, with no drop-side rail and slats no more than 2 3/8 inches apart. Find more information on the Consumer Product Safety Commission Web site at www.cpsc.gov.    If your crib has a drop-side rail, keep it up and locked at all times. Contact the crib company to see if there is a device to keep the drop-side rail from falling down.    Keep soft objects and loose bedding such as comforters, pillows, bumper pads, and toys out of the crib.    Lower your babys mattress all the way.    If using a mesh playpen, make sure the openings are less than 1/4 inch apart. Safety    Use a rear-facing car safety seat in the back seat in all vehicles, even for very short trips.    Never put your baby in the front seat of a vehicle with a passenger air bag.    Dont leave your baby alone in the tub or high places such as changing tables, beds, or sofas.    While in the kitchen, keep your baby in a high chair or playpen.    Do not use a baby walker.    Place arciniega on stairs.    Close doors to rooms where your  baby could be hurt, like the bathroom.    Prevent burns by setting your water heater so the temperature at the faucet is 120 F or lower.    Turn pot handles inward on the stove.    Do not leave hot irons or hair care products plugged in.    Never leave your baby alone near water or in bathwater, even in a bath seat or ring.    Always be close enough to touch your baby.    Lock up poisons, medicines, and cleaning supplies; call Poison Help if your baby eats them.  What to Expect at Your Babys 9 Month Visit We will talk about    Disciplining your baby    Introducing new foods and establishing a routine    Helping your baby learn    Car seat safety    Safety at home    _______________________________________  Poison Help: 1-120.329.2065  Child safety seat inspection: 5-898-JEMHFUGAB; seatcheck.org

## 2024-02-16 NOTE — SUBJECTIVE & OBJECTIVE
"Interval HPI:   Overnight events: Remains in SICU. Intubated. BG well controlled on current SQ insulin regimen. Creatinine 2.6. Tube feeds off.   Eating:   NPO  Nausea: No  Hypoglycemia and intervention: No  Fever: No  TPN and/or TF: Yes  If yes, type of TF/TPN and rate: Tube feeds at 30 ml/hr (on hold)     /69   Pulse 80   Temp 98 °F (36.7 °C) (Oral)   Resp 16   Ht 5' 5" (1.651 m)   Wt (!) 152.9 kg (337 lb)   LMP 01/01/2024 (Approximate) Comment: tubal ligation  SpO2 100%   BMI 56.08 kg/m²     Labs Reviewed and Include    Recent Labs   Lab 02/16/24  0314   *   CALCIUM 8.4*   ALBUMIN 1.7*   PROT 6.8   *   K 4.9   CO2 23      BUN 30*   CREATININE 2.6*   ALKPHOS 101   ALT 9*   AST 25   BILITOT 0.2     Lab Results   Component Value Date    WBC 11.35 02/16/2024    HGB 6.6 (L) 02/16/2024    HCT 21.5 (L) 02/16/2024    MCV 87 02/16/2024     02/16/2024     No results for input(s): "TSH", "FREET4" in the last 168 hours.  Lab Results   Component Value Date    HGBA1C 10.4 (H) 01/30/2024       Nutritional status:   Body mass index is 56.08 kg/m².  Lab Results   Component Value Date    ALBUMIN 1.7 (L) 02/16/2024    ALBUMIN 1.7 (L) 02/15/2024    ALBUMIN 1.7 (L) 02/15/2024     No results found for: "PREALBUMIN"    Estimated Creatinine Clearance: 37.7 mL/min (A) (based on SCr of 2.6 mg/dL (H)).    Accu-Checks  Recent Labs     02/14/24 2005 02/14/24  2349 02/15/24  0417 02/15/24  1008 02/15/24  1650 02/15/24  2003 02/15/24  2351 02/16/24  0432 02/16/24  0743 02/16/24  0902   POCTGLUCOSE 165* 155* 128* 110 138* 144* 152* 120* 125* 124*       Current Medications and/or Treatments Impacting Glycemic Control  Immunotherapy:    Immunosuppressants       None          Steroids:   Hormones (From admission, onward)      None          Pressors:    Autonomic Drugs (From admission, onward)      None          Hyperglycemia/Diabetes Medications:   Antihyperglycemics (From admission, onward)      Start     " Stop Route Frequency Ordered    02/13/24 0915  insulin detemir U-100 (Levemir) pen 14 Units         -- SubQ Daily 02/13/24 0906    02/09/24 1200  insulin aspart U-100 pen 6 Units         -- SubQ Every 4 hours 02/09/24 0901    02/03/24 1010  insulin aspart U-100 pen 0-10 Units         -- SubQ Every 4 hours PRN 02/03/24 0911

## 2024-02-16 NOTE — ASSESSMENT & PLAN NOTE
Lab Results   Component Value Date    CREATININE 2.6 (H) 02/16/2024     Avoid insulin stacking  Titrate insulin slowly

## 2024-02-16 NOTE — PLAN OF CARE
"      SICU PLAN OF CARE NOTE    Dx: Ayaz's gangrene    Shift Events: no acute events overnight. X1 unit of PRBCs transfused. X1 BM overnight. BMS was pushed out w/ BM. Left out and replace if needed. Pt turned q2hrs. TF stopped at 0000 for procedure.    Goals of Care: MAP>65, SBP<170    Neuro: responds to pain. Withdraws in all extremities except LUE. Pupils 4 equal, round, and reactive. Corneal, cough, and gag, reflexes in place.    Vital Signs: /61   Pulse 81   Temp 98 °F (36.7 °C) (Oral)   Resp 17   Ht 5' 5" (1.651 m)   Wt (!) 152.9 kg (337 lb)   LMP 01/01/2024 (Approximate) Comment: tubal ligation  SpO2 100%   BMI 56.08 kg/m²     Cardiac: NSR    Respiratory: Ventilator PS Spont 30% 5PEEP    Diet: NPO and Tube Feeds    Gtts: Abx    Urine Output: Urinary Catheter 297 cc/shift    Drains:   R groin WV x2 CDI putting out serosanguinous drainage. No alarms noted overnight. Seal intact.  OG w/ TF running at 30mls/hr. Pt tolerated well. Stopped at 0000 for procedure     Labs/Accuchecks: Daily labs. Renal and Mag q8hrs. Accuchecks q4hr. Coverage given per MAR. Scheduled insulin held when TF were stopped per MAR order.    Skin: See LDA for lines/drains. NO new breakdown noted. Pt turned q2hr. Pt in specialty bed. Foams in place.      "

## 2024-02-16 NOTE — PROGRESS NOTES
[facility]  Nephrology  Progress Note    Patient Name: Sarah Saravia  MRN: [id number]  Admission Date: 1/29/2024  Hospital Length of Stay: 18 days  Attending Provider: Steve Cole MD   Primary Care Physician: [facility]  Principal Problem:Ayaz's gangrene    Subjective:     HPI: Sarah Saravia is a 53 year old female with a past medical history of obesity (BMI 53) admitted with N/V and R groin wound found to be in DKA at OSH.  She underwent a CT abdomen and pelvis that showed extensive soft tissue air throughout the right groin and inguinal region and extending to involve the right lower quadrant anterior abdominal wall with extensive soft tissue air also seen involving the proximal medial aspect of the right thigh, concerning for gas-forming infection. She is s/p OR debridement for necrotizing fascitis on 1/29/24 and take back for 1/31/24. Right groin tissue growing proteus, susceptibilities still pending. Currently on meropenem and clindamycin which was d/c'd on 1/31/24. While at OSH pt developed acute respiratory failure requiring intubation. Nephrology was consulted for worsening alvarado in the setting of lactic acidosis and septic shock likely ATN, sCr elevated at 3.8 on admit.  OR today for debridement with possible closure and wound vac placement. Last HD 2/1. Net -1.3L. Electrolytes stable. Nephrology consulted for ALVARADO.     Interval History:     No acute events overnight. Patient remains intubated, UOP ~622 on IV lasix 80mg BID.     Review of patient's allergies indicates:  No Known Allergies  Current Facility-Administered Medications   Medication Frequency    0.9%  NaCl infusion (for blood administration) Q24H PRN    0.9%  NaCl infusion PRN    0.9%  NaCl infusion Once    acetaminophen tablet 650 mg Q4H PRN    albuterol-ipratropium 2.5 mg-0.5 mg/3 mL nebulizer solution 3 mL Q4H PRN    amLODIPine tablet 10 mg Daily    carvediloL tablet 25 mg BID     cefTRIAXone (ROCEPHIN) 2 g in dextrose 5 % in water (D5W) 100 mL IVPB (MB+) Daily    dextrose 10 % infusion Continuous PRN    dextrose 10% bolus 125 mL 125 mL PRN    dextrose 10% bolus 250 mL 250 mL PRN    famotidine tablet 20 mg Daily    furosemide injection 120 mg TID    glucagon (human recombinant) injection 1 mg PRN    glucose chewable tablet 16 g PRN    glucose chewable tablet 24 g PRN    hydrALAZINE injection 10 mg Q4H PRN    insulin aspart U-100 pen 0-10 Units Q4H PRN    insulin aspart U-100 pen 6 Units Q4H    insulin detemir U-100 (Levemir) pen 14 Units Daily    labetalol 20 mg/4 mL (5 mg/mL) IV syring Q6H PRN    metronidazole IVPB 500 mg Q8H    naloxone 0.4 mg/mL injection 0.02 mg PRN    ondansetron injection 4 mg Q6H PRN    oxyCODONE 5 mg/5 mL solution 5 mg Q6H PRN    prochlorperazine injection Soln 5 mg Q6H PRN    psyllium husk (aspartame) 3.4 gram packet 1 packet BID    sevelamer carbonate pwpk 1.6 g TID WM    sodium chloride 0.9% bolus 250 mL 250 mL PRN    sodium chloride 0.9% bolus 250 mL 250 mL PRN    sodium chloride 0.9% flush 10 mL PRN       Objective:     Vital Signs (Most Recent):  Temp: 98.6 °F (37 °C) (02/16/24 1101)  Pulse: 83 (02/16/24 1304)  Resp: 20 (02/16/24 1304)  BP: 132/60 (02/16/24 1304)  SpO2: 100 % (02/16/24 1304) Vital Signs (24h Range):  Temp:  [98 °F (36.7 °C)-99.6 °F (37.6 °C)] 98.6 °F (37 °C)  Pulse:  [76-95] 83  Resp:  [12-27] 20  SpO2:  [100 %] 100 %  BP: (108-150)/(55-74) 132/60     Weight: (!) 152.9 kg (337 lb) (02/16/24 0300)  Body mass index is 56.08 kg/m².  Body surface area is 2.65 meters squared.    I/O last 3 completed shifts:  In: 2529.1 [I.V.:514.5; NG/GT:630; IV Piggyback:1384.6]  Out: 2955 [Urine:1172; Other:1783]     Physical Exam  Vitals and nursing note reviewed.   Constitutional:       General: She is not in acute distress.     Appearance: She is ill-appearing.   HENT:      Head: Normocephalic and atraumatic.   Eyes:      Extraocular Movements: Extraocular  movements intact.      Conjunctiva/sclera: Conjunctivae normal.   Neck:      Comments: Left IJ Trialysis catheter  Cardiovascular:      Rate and Rhythm: Normal rate and regular rhythm.   Pulmonary:      Effort: Pulmonary effort is normal.      Comments: Vent Mode: Spont  Oxygen Concentration (%):  [30] 30  Resp Rate Total:  [15 br/min-28 br/min] 23 br/min  PEEP/CPAP:  [5 cmH20] 5 cmH20  Pressure Support:  [10 cmH20] 10 cmH20  Mean Airway Pressure:  [8.8 fuT82-21 cmH20] 8.8 cmH20     Abdominal:      General: There is no distension.      Palpations: Abdomen is soft.      Tenderness: There is no abdominal tenderness.   Genitourinary:     Comments: Glynn in place  Rectal Tube  Skin:     General: Skin is warm and dry.      Comments: Wound vac in place to R thigh/groin to suction   Neurological:      General: No focal deficit present.      Mental Status: She is oriented to person, place, and time.   Psychiatric:         Mood and Affect: Mood normal.         Behavior: Behavior normal.          Significant Labs:  All labs within the past 24 hours have been reviewed.     Significant Imaging:  Labs: Reviewed  Assessment/Plan:     Renal/  * Ayaz's gangrene  - management per primary     ALVARADO (acute kidney injury)  Transfer from [facility]. Admitted for fourniers gangrene. Initiated HD on 1/31. ALVARADO 2/2 ATN in setting of septic shock. Arrived with CR 3.8. Unknown baseline. S/P OR debridement with possible closure and wound vac placement     ALVARADO most likley ATN in setting of septic shock     Plan/Recommendation  -No urgent indications for RRT at this time. Will monitor need on daily basis. Likely plan for RRT over the weekend.   - IV lasix 120mg TID.   -Daily RFP   -Strict I&Os  -Avoid nephrotoxins, if possible     ID  Severe sepsis  -        Thank you for your consult. I will follow-up with patient. Please contact us if you have any additional questions.    Case discussed with attending. Attestation to follow.       Antione  DO Ilir  Nephrology  [facility]

## 2024-02-16 NOTE — SUBJECTIVE & OBJECTIVE
Interval History:     No acute events overnight. Patient remains intubated, UOP ~622 on IV lasix 80mg BID.     Review of patient's allergies indicates:  No Known Allergies  Current Facility-Administered Medications   Medication Frequency    0.9%  NaCl infusion (for blood administration) Q24H PRN    0.9%  NaCl infusion PRN    0.9%  NaCl infusion Once    acetaminophen tablet 650 mg Q4H PRN    albuterol-ipratropium 2.5 mg-0.5 mg/3 mL nebulizer solution 3 mL Q4H PRN    amLODIPine tablet 10 mg Daily    carvediloL tablet 25 mg BID    cefTRIAXone (ROCEPHIN) 2 g in dextrose 5 % in water (D5W) 100 mL IVPB (MB+) Daily    dextrose 10 % infusion Continuous PRN    dextrose 10% bolus 125 mL 125 mL PRN    dextrose 10% bolus 250 mL 250 mL PRN    famotidine tablet 20 mg Daily    furosemide injection 120 mg TID    glucagon (human recombinant) injection 1 mg PRN    glucose chewable tablet 16 g PRN    glucose chewable tablet 24 g PRN    hydrALAZINE injection 10 mg Q4H PRN    insulin aspart U-100 pen 0-10 Units Q4H PRN    insulin aspart U-100 pen 6 Units Q4H    insulin detemir U-100 (Levemir) pen 14 Units Daily    labetalol 20 mg/4 mL (5 mg/mL) IV syring Q6H PRN    metronidazole IVPB 500 mg Q8H    naloxone 0.4 mg/mL injection 0.02 mg PRN    ondansetron injection 4 mg Q6H PRN    oxyCODONE 5 mg/5 mL solution 5 mg Q6H PRN    prochlorperazine injection Soln 5 mg Q6H PRN    psyllium husk (aspartame) 3.4 gram packet 1 packet BID    sevelamer carbonate pwpk 1.6 g TID WM    sodium chloride 0.9% bolus 250 mL 250 mL PRN    sodium chloride 0.9% bolus 250 mL 250 mL PRN    sodium chloride 0.9% flush 10 mL PRN       Objective:     Vital Signs (Most Recent):  Temp: 98.6 °F (37 °C) (02/16/24 1101)  Pulse: 83 (02/16/24 1304)  Resp: 20 (02/16/24 1304)  BP: 132/60 (02/16/24 1304)  SpO2: 100 % (02/16/24 1304) Vital Signs (24h Range):  Temp:  [98 °F (36.7 °C)-99.6 °F (37.6 °C)] 98.6 °F (37 °C)  Pulse:  [76-95] 83  Resp:  [12-27] 20  SpO2:  [100 %] 100  %  BP: (108-150)/(55-74) 132/60     Weight: (!) 152.9 kg (337 lb) (02/16/24 0300)  Body mass index is 56.08 kg/m².  Body surface area is 2.65 meters squared.    I/O last 3 completed shifts:  In: 2529.1 [I.V.:514.5; NG/GT:630; IV Piggyback:1384.6]  Out: 2955 [Urine:1172; Other:1783]     Physical Exam  Vitals and nursing note reviewed.   Constitutional:       General: She is not in acute distress.     Appearance: She is ill-appearing.   HENT:      Head: Normocephalic and atraumatic.   Eyes:      Extraocular Movements: Extraocular movements intact.      Conjunctiva/sclera: Conjunctivae normal.   Neck:      Comments: Left IJ Trialysis catheter  Cardiovascular:      Rate and Rhythm: Normal rate and regular rhythm.   Pulmonary:      Effort: Pulmonary effort is normal.      Comments: Vent Mode: Spont  Oxygen Concentration (%):  [30] 30  Resp Rate Total:  [15 br/min-28 br/min] 23 br/min  PEEP/CPAP:  [5 cmH20] 5 cmH20  Pressure Support:  [10 cmH20] 10 cmH20  Mean Airway Pressure:  [8.8 xuC77-18 cmH20] 8.8 cmH20     Abdominal:      General: There is no distension.      Palpations: Abdomen is soft.      Tenderness: There is no abdominal tenderness.   Genitourinary:     Comments: Glynn in place  Rectal Tube  Skin:     General: Skin is warm and dry.      Comments: Wound vac in place to R thigh/groin to suction   Neurological:      General: No focal deficit present.      Mental Status: She is oriented to person, place, and time.   Psychiatric:         Mood and Affect: Mood normal.         Behavior: Behavior normal.          Significant Labs:  All labs within the past 24 hours have been reviewed.     Significant Imaging:  Labs: Reviewed

## 2024-02-16 NOTE — ASSESSMENT & PLAN NOTE
Neuro/Psych:   #Acute Encephalopathy  CTH 2/3 with scattered hypoattenuation likely representing age indeterminate infarcts. EEG findings consistent with moderate-severe encephalopathy. MRI 2/6: Several scattered punctate acute infarcts throughout the bilateral frontal lobes, centrum semiovale, basal ganglia, corpus callosum, and cerebellar hemispheres.  CTA 2/6: Atherosclerotic plaquing of the carotid bifurcations and proximal ICAs with less than 50% proximal ICA stenosis by NASCET criteria . Repeat CTH and MRI/MRA unchanged from prior exams. S/p multiple wound vac exchanges. Palliative onboard with family wanting full measures.    Neuro/Psych  Repeat CTH and MRI/MRA stable from initial reads. LP 2/16. Elevated WBCs and protein consistent with bacterial meningitis.  -- Sedation: None  -- Pain: kermit tylenol, dialudid and oxy prn  -- GCS 5T: E2, V1T, M2  -- Neurology following; appreciate recs  -- Neurovascular following; appreciate recs  -- Palliative following; GOC conversation 2/7; appreciate recs  -- F/u LP cultures.              Cards:   -- HDS  -- goal SBP < 180, MAP >65  -- hypertensive, hydralazine and labetalol prns  -- Continue amlodipine 10mg daily; coreg 25mg BID      Pulm:   #Acute Respiratory Failure  -- Intubated on spontaneous , Pressure support.  -- Goal O2 sat > 90%  -- Trach/PEG next week       Renal:  #ALVARADO on CKD  #ATN  Received HD 2/9. 2/13 LI trialysis  -- Nephrology following; appreciate recs  -- RRT as needed    Recent Labs   Lab 02/15/24  1429 02/15/24  2206 02/16/24  0314   BUN 25*  25* 27*  27* 30*   CREATININE 2.2*  2.2* 2.5*  2.5* 2.6*        FEN / GI:   -- Daily CMPs  -- NGT in place   -- Replace lytes as needed  -- Nutrition: NPO, TF held. Will restart (Novasource Renal) after OR  -- GI PPX   -- Nutrition following; appreciate recs  -- Continue psyllium      ID:    #Ayaz's gangrene  s/p wound vac exchange/ debridement x4 for nec fascitis. R groin tissue with proteus, sensitive  to ceftriaxone. Growing bacteroids as well.   -- Abx: ceftriaxone and flagyl EOT 2/22  -- ID following ; appreciate recs  -- Wound vac exchange Monday 2/19       Heme/Onc:  #Anemia  -- Daily CBC  -- Heparin DVT ppx  -- Transfuse if Hgb <7     Endo:   #IDDM  Initially presented to OSH with DKA with latest A1C 10.4 AG Gap resolved  Placed on insulin gtt 2/3 and dced 2/12.    - q4h BG monitoring while NPO  - Detemir 12 units daily  - Novolog 6units q4h while on TFs  - Moderate dose SSI PRN  - Endocrine following, appreciate recs      PPx:   Feeding: NPO, Tube Feeds  Analgesia/Sedation: See above  Thromboembolic prevention: SQH   HOB >30: Y  Stress Ulcer ppx: Famotidine  Glucose control: Goal 140-180 g/dl, kermit detemir and novolog, SSI, Endo following  Bowel reg: psyllium  Invasive Lines/Drains/Airway: Glynn, ETT, LIJ Trialysis, PIV x2, Rectal tube      Dispo/Code Status/Palliative:   -- SICU / Full Code   -- Pending LP cultures  -- Wound vac exchange Monday

## 2024-02-16 NOTE — PROCEDURES
Radiology Post-Procedure Note    Pre Op Diagnosis: encephalopathy    Post Op Diagnosis: Same    Procedure: lumbar puncture    Procedure performed by: Osvaldo Unger DO, Kamran Echols MD    Written Informed Consent Obtained: Consent obtained via telephone with daughter.    Specimen Removed: 11 mL CSF    Estimated Blood Loss: Minimal    Findings: Following written informed consent and sterile prep and drape, a 22 gauge spinal needle was inserted at L2 - L3 intralaminar space under fluoroscopic surveillance.  11 mL clear CSF removed and sent to the lab for further analysis.  Opening pressure 21 cm H2O in prone position. There were no complications.    Patient tolerated procedure well.    Osvaldo Unger DO  PGY-V  Diagnostic and Interventional Radiology  [facility]

## 2024-02-16 NOTE — ASSESSMENT & PLAN NOTE
Transfer from [facility]. Admitted for fourniers gangrene. Initiated HD on 1/31. ALVARADO 2/2 ATN in setting of septic shock. Arrived with CR 3.8. Unknown baseline. S/P OR debridement with possible closure and wound vac placement     ALVARADO most likley ATN in setting of septic shock     Plan/Recommendation  -No urgent indications for RRT at this time. Will monitor need on daily basis. Likely plan for RRT over the weekend.   - IV lasix 120mg TID.   -Daily RFP   -Strict I&Os  -Avoid nephrotoxins, if possible

## 2024-02-16 NOTE — SUBJECTIVE & OBJECTIVE
Interval History/Significant Events: NAEON. Hydralazine and oxycodone 5mg given. UOP is improving after lasix challenge.     Follow-up For: Procedure(s) (LRB):  REPLACEMENT, WOUND VAC (N/A)    Post-Operative Day: 1 Day Post-Op    Objective:     Vital Signs (Most Recent):  Temp: 98 °F (36.7 °C) (02/16/24 0530)  Pulse: 81 (02/16/24 0630)  Resp: 17 (02/16/24 0630)  BP: 128/62 (02/16/24 0630)  SpO2: 100 % (02/16/24 0630) Vital Signs (24h Range):  Temp:  [98 °F (36.7 °C)-99.8 °F (37.7 °C)] 98 °F (36.7 °C)  Pulse:  [80-95] 81  Resp:  [12-28] 17  SpO2:  [97 %-100 %] 100 %  BP: (117-173)/() 128/62     Weight: (!) 152.9 kg (337 lb)  Body mass index is 56.08 kg/m².      Intake/Output Summary (Last 24 hours) at 2/16/2024 0656  Last data filed at 2/16/2024 0600  Gross per 24 hour   Intake 1201.74 ml   Output 1066 ml   Net 135.74 ml          Physical Exam     Appearance: She is obese.   HENT:      Head: Normocephalic.      Mouth/Throat:      Mouth: Mucous membranes are moist.   Eyes:      Comments: PERRL, no gaze  Neck:      Comments: LIJ Trialysis  Cardiovascular:      Rate and Rhythm: Normal rate and regular rhythm.   Pulmonary:      Effort: Pulmonary effort is normal.      Comments: Intubated  Abdominal:      General: Abdomen is flat.      Palpations: Abdomen is soft.   Genitourinary:     Comments: R groin wound   Glynn in place  Rectal tube  Musculoskeletal:       BLE nonedematous   Skin:     General: Skin is warm.   Neurological:      Comments: eye opening to pressure. Abnormal flexion per GCS          Vents:  Vent Mode: Spont (02/16/24 0348)  Set Rate: 18 BPM (02/06/24 0349)  Vt Set: 420 mL (02/06/24 0349)  Pressure Support: 10 cmH20 (02/16/24 0348)  PEEP/CPAP: 5 cmH20 (02/16/24 0348)  Oxygen Concentration (%): 30 (02/16/24 0630)  Peak Airway Pressure: 15 cmH20 (02/16/24 0348)  Plateau Pressure: 15 cmH20 (02/16/24 0348)  Total Ve: 7.3 L/m (02/16/24 0348)  Negative Inspiratory Force (cm H2O): 0 (02/16/24 0348)  F/VT  Ratio<105 (RSBI): (!) 37.5 (02/16/24 0348)    Lines/Drains/Airways       Central Venous Catheter Line  Duration             Trialysis (Dialysis) Catheter 02/11/24 2303 left internal jugular 4 days              Drain  Duration                  Rectal Tube 02/04/24 1545 11 days         NG/OG Tube 02/06/24 1030 Center mouth 9 days         Urethral Catheter 02/06/24 1900 9 days              Airway  Duration                  Airway - Non-Surgical 01/31/24 1020 15 days              Peripheral Intravenous Line  Duration                  Peripheral IV - Single Lumen 02/13/24 1132 18 G;1 3/4 in Right Forearm 2 days                    Significant Labs:    CBC/Anemia Profile:  Recent Labs   Lab 02/15/24  0414 02/16/24  0314   WBC 11.60 11.35   HGB 7.1* 6.6*   HCT 22.9* 21.5*    312   MCV 88 87   RDW 18.1* 18.0*        Chemistries:  Recent Labs   Lab 02/15/24  0414 02/15/24  1429 02/15/24  2206 02/16/24 0314    136  136 134*  134* 135*   K 4.6 5.2*  5.2* 5.2*  5.2* 4.9    104  104 105  105 104   CO2 23 23  23 22*  22* 23   BUN 27* 25*  25* 27*  27* 30*   CREATININE 2.2* 2.2*  2.2* 2.5*  2.5* 2.6*   CALCIUM 7.9* 8.3*  8.3* 8.4*  8.4* 8.4*   ALBUMIN 1.7* 1.8*  1.8* 1.7*  1.7* 1.7*   PROT 6.9  --   --  6.8   BILITOT 0.2  --   --  0.2   ALKPHOS 108  --   --  101   ALT 11  --   --  9*   AST 27  --   --  25   MG 1.8 2.4  2.4 2.2  2.2 2.2   PHOS 4.1 4.7*  4.7* 4.7*  4.7* 4.9*       Significant Imaging:  N/A

## 2024-02-17 LAB
ABO + RH BLD: NORMAL
ALBUMIN SERPL BCP-MCNC: 1.8 G/DL (ref 3.5–5.2)
ALP SERPL-CCNC: 125 U/L (ref 55–135)
ALT SERPL W/O P-5'-P-CCNC: 10 U/L (ref 10–44)
ANION GAP SERPL CALC-SCNC: 11 MMOL/L (ref 8–16)
ANION GAP SERPL CALC-SCNC: 11 MMOL/L (ref 8–16)
ANION GAP SERPL CALC-SCNC: 9 MMOL/L (ref 8–16)
AST SERPL-CCNC: 28 U/L (ref 10–40)
BACTERIA UR CULT: ABNORMAL
BASOPHILS # BLD AUTO: 0.06 K/UL (ref 0–0.2)
BASOPHILS NFR BLD: 0.5 % (ref 0–1.9)
BILIRUB SERPL-MCNC: 0.2 MG/DL (ref 0.1–1)
BLD GP AB SCN CELLS X3 SERPL QL: NORMAL
BUN SERPL-MCNC: 36 MG/DL (ref 6–20)
BUN SERPL-MCNC: 37 MG/DL (ref 6–20)
BUN SERPL-MCNC: 41 MG/DL (ref 6–20)
CA-I BLDV-SCNC: 1.07 MMOL/L (ref 1.06–1.42)
CA-I BLDV-SCNC: 1.08 MMOL/L (ref 1.06–1.42)
CA-I BLDV-SCNC: 1.08 MMOL/L (ref 1.06–1.42)
CALCIUM SERPL-MCNC: 8.3 MG/DL (ref 8.7–10.5)
CALCIUM SERPL-MCNC: 8.5 MG/DL (ref 8.7–10.5)
CALCIUM SERPL-MCNC: 8.5 MG/DL (ref 8.7–10.5)
CHLORIDE SERPL-SCNC: 104 MMOL/L (ref 95–110)
CHLORIDE SERPL-SCNC: 104 MMOL/L (ref 95–110)
CHLORIDE SERPL-SCNC: 106 MMOL/L (ref 95–110)
CO2 SERPL-SCNC: 21 MMOL/L (ref 23–29)
CO2 SERPL-SCNC: 22 MMOL/L (ref 23–29)
CO2 SERPL-SCNC: 22 MMOL/L (ref 23–29)
CREAT SERPL-MCNC: 2.9 MG/DL (ref 0.5–1.4)
CREAT SERPL-MCNC: 3.4 MG/DL (ref 0.5–1.4)
CREAT SERPL-MCNC: 3.5 MG/DL (ref 0.5–1.4)
DIFFERENTIAL METHOD BLD: ABNORMAL
EOSINOPHIL # BLD AUTO: 0.5 K/UL (ref 0–0.5)
EOSINOPHIL NFR BLD: 3.9 % (ref 0–8)
ERYTHROCYTE [DISTWIDTH] IN BLOOD BY AUTOMATED COUNT: 17.3 % (ref 11.5–14.5)
EST. GFR  (NO RACE VARIABLE): 15 ML/MIN/1.73 M^2
EST. GFR  (NO RACE VARIABLE): 15.5 ML/MIN/1.73 M^2
EST. GFR  (NO RACE VARIABLE): 18.8 ML/MIN/1.73 M^2
GLUCOSE SERPL-MCNC: 125 MG/DL (ref 70–110)
GLUCOSE SERPL-MCNC: 135 MG/DL (ref 70–110)
GLUCOSE SERPL-MCNC: 135 MG/DL (ref 70–110)
HCT VFR BLD AUTO: 24.9 % (ref 37–48.5)
HGB BLD-MCNC: 7.9 G/DL (ref 12–16)
IMM GRANULOCYTES # BLD AUTO: 0.05 K/UL (ref 0–0.04)
IMM GRANULOCYTES NFR BLD AUTO: 0.4 % (ref 0–0.5)
LACTATE DEHYDROGENASE FLUID: 31 U/L
LYMPHOCYTES # BLD AUTO: 1.3 K/UL (ref 1–4.8)
LYMPHOCYTES NFR BLD: 10.6 % (ref 18–48)
MAGNESIUM SERPL-MCNC: 1.9 MG/DL (ref 1.6–2.6)
MAGNESIUM SERPL-MCNC: 1.9 MG/DL (ref 1.6–2.6)
MCH RBC QN AUTO: 27.3 PG (ref 27–31)
MCHC RBC AUTO-ENTMCNC: 31.7 G/DL (ref 32–36)
MCV RBC AUTO: 86 FL (ref 82–98)
MONOCYTES # BLD AUTO: 1 K/UL (ref 0.3–1)
MONOCYTES NFR BLD: 8.5 % (ref 4–15)
NEUTROPHILS # BLD AUTO: 9.3 K/UL (ref 1.8–7.7)
NEUTROPHILS NFR BLD: 76.1 % (ref 38–73)
NRBC BLD-RTO: 0 /100 WBC
PHOSPHATE SERPL-MCNC: 5.1 MG/DL (ref 2.7–4.5)
PHOSPHATE SERPL-MCNC: 5.3 MG/DL (ref 2.7–4.5)
PHOSPHATE SERPL-MCNC: 5.4 MG/DL (ref 2.7–4.5)
PLATELET # BLD AUTO: 332 K/UL (ref 150–450)
PMV BLD AUTO: 9.7 FL (ref 9.2–12.9)
POCT GLUCOSE: 115 MG/DL (ref 70–110)
POCT GLUCOSE: 124 MG/DL (ref 70–110)
POCT GLUCOSE: 129 MG/DL (ref 70–110)
POCT GLUCOSE: 132 MG/DL (ref 70–110)
POCT GLUCOSE: 137 MG/DL (ref 70–110)
POCT GLUCOSE: 139 MG/DL (ref 70–110)
POCT GLUCOSE: 140 MG/DL (ref 70–110)
POTASSIUM SERPL-SCNC: 4.4 MMOL/L (ref 3.5–5.1)
POTASSIUM SERPL-SCNC: 4.6 MMOL/L (ref 3.5–5.1)
POTASSIUM SERPL-SCNC: 4.7 MMOL/L (ref 3.5–5.1)
PROT SERPL-MCNC: 7 G/DL (ref 6–8.4)
RBC # BLD AUTO: 2.89 M/UL (ref 4–5.4)
SODIUM SERPL-SCNC: 136 MMOL/L (ref 136–145)
SODIUM SERPL-SCNC: 137 MMOL/L (ref 136–145)
SODIUM SERPL-SCNC: 137 MMOL/L (ref 136–145)
SPECIMEN OUTDATE: NORMAL
WBC # BLD AUTO: 12.23 K/UL (ref 3.9–12.7)

## 2024-02-17 PROCEDURE — 63600175 PHARM REV CODE 636 W HCPCS

## 2024-02-17 PROCEDURE — 99233 SBSQ HOSP IP/OBS HIGH 50: CPT | Mod: ,,, | Performed by: INTERNAL MEDICINE

## 2024-02-17 PROCEDURE — 83735 ASSAY OF MAGNESIUM: CPT | Mod: 91 | Performed by: STUDENT IN AN ORGANIZED HEALTH CARE EDUCATION/TRAINING PROGRAM

## 2024-02-17 PROCEDURE — 80069 RENAL FUNCTION PANEL: CPT | Mod: 91 | Performed by: STUDENT IN AN ORGANIZED HEALTH CARE EDUCATION/TRAINING PROGRAM

## 2024-02-17 PROCEDURE — 84100 ASSAY OF PHOSPHORUS: CPT

## 2024-02-17 PROCEDURE — 25000003 PHARM REV CODE 250

## 2024-02-17 PROCEDURE — 85025 COMPLETE CBC W/AUTO DIFF WBC: CPT

## 2024-02-17 PROCEDURE — 27100171 HC OXYGEN HIGH FLOW UP TO 24 HOURS

## 2024-02-17 PROCEDURE — 86850 RBC ANTIBODY SCREEN: CPT | Performed by: STUDENT IN AN ORGANIZED HEALTH CARE EDUCATION/TRAINING PROGRAM

## 2024-02-17 PROCEDURE — 25000003 PHARM REV CODE 250: Performed by: STUDENT IN AN ORGANIZED HEALTH CARE EDUCATION/TRAINING PROGRAM

## 2024-02-17 PROCEDURE — 99900035 HC TECH TIME PER 15 MIN (STAT)

## 2024-02-17 PROCEDURE — 25000242 PHARM REV CODE 250 ALT 637 W/ HCPCS

## 2024-02-17 PROCEDURE — 94003 VENT MGMT INPAT SUBQ DAY: CPT

## 2024-02-17 PROCEDURE — 83735 ASSAY OF MAGNESIUM: CPT

## 2024-02-17 PROCEDURE — 63600175 PHARM REV CODE 636 W HCPCS: Performed by: HOSPITALIST

## 2024-02-17 PROCEDURE — 99291 CRITICAL CARE FIRST HOUR: CPT | Mod: ,,, | Performed by: SURGERY

## 2024-02-17 PROCEDURE — 80053 COMPREHEN METABOLIC PANEL: CPT

## 2024-02-17 PROCEDURE — 20000000 HC ICU ROOM

## 2024-02-17 PROCEDURE — 82330 ASSAY OF CALCIUM: CPT | Mod: 91 | Performed by: STUDENT IN AN ORGANIZED HEALTH CARE EDUCATION/TRAINING PROGRAM

## 2024-02-17 PROCEDURE — 99900026 HC AIRWAY MAINTENANCE (STAT)

## 2024-02-17 PROCEDURE — 80069 RENAL FUNCTION PANEL: CPT | Performed by: STUDENT IN AN ORGANIZED HEALTH CARE EDUCATION/TRAINING PROGRAM

## 2024-02-17 PROCEDURE — 94761 N-INVAS EAR/PLS OXIMETRY MLT: CPT

## 2024-02-17 PROCEDURE — 99232 SBSQ HOSP IP/OBS MODERATE 35: CPT | Mod: ,,, | Performed by: NURSE PRACTITIONER

## 2024-02-17 RX ADMIN — METRONIDAZOLE 500 MG: 5 INJECTION, SOLUTION INTRAVENOUS at 08:02

## 2024-02-17 RX ADMIN — INSULIN ASPART 6 UNITS: 100 INJECTION, SOLUTION INTRAVENOUS; SUBCUTANEOUS at 04:02

## 2024-02-17 RX ADMIN — FUROSEMIDE 120 MG: 10 INJECTION, SOLUTION INTRAVENOUS at 08:02

## 2024-02-17 RX ADMIN — HYDRALAZINE HYDROCHLORIDE 10 MG: 20 INJECTION, SOLUTION INTRAMUSCULAR; INTRAVENOUS at 05:02

## 2024-02-17 RX ADMIN — CARVEDILOL 25 MG: 25 TABLET, FILM COATED ORAL at 08:02

## 2024-02-17 RX ADMIN — PSYLLIUM HUSK 1 PACKET: 3.4 POWDER ORAL at 08:02

## 2024-02-17 RX ADMIN — OXYCODONE HYDROCHLORIDE 5 MG: 5 SOLUTION ORAL at 06:02

## 2024-02-17 RX ADMIN — SEVELAMER CARBONATE 1.6 G: 800 POWDER, FOR SUSPENSION ORAL at 04:02

## 2024-02-17 RX ADMIN — INSULIN ASPART 6 UNITS: 100 INJECTION, SOLUTION INTRAVENOUS; SUBCUTANEOUS at 07:02

## 2024-02-17 RX ADMIN — SEVELAMER CARBONATE 1.6 G: 800 POWDER, FOR SUSPENSION ORAL at 07:02

## 2024-02-17 RX ADMIN — FAMOTIDINE 20 MG: 20 TABLET, FILM COATED ORAL at 08:02

## 2024-02-17 RX ADMIN — FUROSEMIDE 120 MG: 10 INJECTION, SOLUTION INTRAVENOUS at 04:02

## 2024-02-17 RX ADMIN — SEVELAMER CARBONATE 1.6 G: 800 POWDER, FOR SUSPENSION ORAL at 11:02

## 2024-02-17 RX ADMIN — METRONIDAZOLE 500 MG: 5 INJECTION, SOLUTION INTRAVENOUS at 11:02

## 2024-02-17 RX ADMIN — INSULIN ASPART 6 UNITS: 100 INJECTION, SOLUTION INTRAVENOUS; SUBCUTANEOUS at 08:02

## 2024-02-17 RX ADMIN — INSULIN DETEMIR 14 UNITS: 100 INJECTION, SOLUTION SUBCUTANEOUS at 08:02

## 2024-02-17 RX ADMIN — METRONIDAZOLE 500 MG: 5 INJECTION, SOLUTION INTRAVENOUS at 04:02

## 2024-02-17 RX ADMIN — HEPARIN SODIUM 7500 UNITS: 5000 INJECTION INTRAVENOUS; SUBCUTANEOUS at 01:02

## 2024-02-17 RX ADMIN — AMLODIPINE BESYLATE 10 MG: 10 TABLET ORAL at 08:02

## 2024-02-17 RX ADMIN — HEPARIN SODIUM 7500 UNITS: 5000 INJECTION INTRAVENOUS; SUBCUTANEOUS at 10:02

## 2024-02-17 RX ADMIN — CEFTRIAXONE 2 G: 2 INJECTION, POWDER, FOR SOLUTION INTRAMUSCULAR; INTRAVENOUS at 08:02

## 2024-02-17 RX ADMIN — INSULIN ASPART 6 UNITS: 100 INJECTION, SOLUTION INTRAVENOUS; SUBCUTANEOUS at 12:02

## 2024-02-17 RX ADMIN — INSULIN ASPART 6 UNITS: 100 INJECTION, SOLUTION INTRAVENOUS; SUBCUTANEOUS at 11:02

## 2024-02-17 RX ADMIN — HEPARIN SODIUM 7500 UNITS: 5000 INJECTION INTRAVENOUS; SUBCUTANEOUS at 05:02

## 2024-02-17 NOTE — SUBJECTIVE & OBJECTIVE
Interval History:     No acute events overnight. Patient remains intubated, UOP ~2,070 on IV lasix 120mg TID. Tolerated Lasix well overnight, labs stable.     Review of patient's allergies indicates:  No Known Allergies  Current Facility-Administered Medications   Medication Frequency    0.9%  NaCl infusion (for blood administration) Q24H PRN    0.9%  NaCl infusion PRN    0.9%  NaCl infusion Once    acetaminophen tablet 650 mg Q4H PRN    albuterol-ipratropium 2.5 mg-0.5 mg/3 mL nebulizer solution 3 mL Q4H PRN    amLODIPine tablet 10 mg Daily    carvediloL tablet 25 mg BID    cefTRIAXone (ROCEPHIN) 2 g in dextrose 5 % in water (D5W) 100 mL IVPB (MB+) Daily    dextrose 10 % infusion Continuous PRN    dextrose 10% bolus 125 mL 125 mL PRN    dextrose 10% bolus 250 mL 250 mL PRN    famotidine tablet 20 mg Daily    furosemide injection 120 mg TID    glucagon (human recombinant) injection 1 mg PRN    glucose chewable tablet 16 g PRN    glucose chewable tablet 24 g PRN    heparin (porcine) injection 7,500 Units Q8H    hydrALAZINE injection 10 mg Q4H PRN    insulin aspart U-100 pen 0-10 Units Q4H PRN    insulin aspart U-100 pen 6 Units Q4H    insulin detemir U-100 (Levemir) pen 14 Units Daily    labetalol 20 mg/4 mL (5 mg/mL) IV syring Q6H PRN    metronidazole IVPB 500 mg Q8H    naloxone 0.4 mg/mL injection 0.02 mg PRN    ondansetron injection 4 mg Q6H PRN    oxyCODONE 5 mg/5 mL solution 5 mg Q6H PRN    prochlorperazine injection Soln 5 mg Q6H PRN    psyllium husk (aspartame) 3.4 gram packet 1 packet BID    sevelamer carbonate pwpk 1.6 g TID WM    sodium chloride 0.9% bolus 250 mL 250 mL PRN    sodium chloride 0.9% bolus 250 mL 250 mL PRN    sodium chloride 0.9% flush 10 mL PRN       Objective:     Vital Signs (Most Recent):  Temp: (!) 100.7 °F (38.2 °C) (02/17/24 0700)  Pulse: 86 (02/17/24 1000)  Resp: 19 (02/17/24 1000)  BP: (!) 118/57 (02/17/24 1000)  SpO2: 100 % (02/17/24 1000) Vital Signs (24h Range):  Temp:  [98.3 °F  (36.8 °C)-100.7 °F (38.2 °C)] 100.7 °F (38.2 °C)  Pulse:  [] 86  Resp:  [8-40] 19  SpO2:  [97 %-100 %] 100 %  BP: (116-179)/(55-79) 118/57     Weight: (!) 157.9 kg (348 lb) (02/17/24 0300)  Body mass index is 57.91 kg/m².  Body surface area is 2.69 meters squared.    I/O last 3 completed shifts:  In: 2118.8 [Blood:244; NG/GT:780; IV Piggyback:1094.8]  Out: 2847 [Urine:2397; Other:450]     Physical Exam  Vitals and nursing note reviewed.   Constitutional:       General: She is not in acute distress.     Appearance: She is ill-appearing.   HENT:      Head: Normocephalic and atraumatic.   Eyes:      Extraocular Movements: Extraocular movements intact.      Conjunctiva/sclera: Conjunctivae normal.   Neck:      Comments: Left IJ Trialysis catheter  Cardiovascular:      Rate and Rhythm: Normal rate and regular rhythm.   Pulmonary:      Effort: Pulmonary effort is normal.      Comments: Vent Mode: Spont  Oxygen Concentration (%):  [30] 30  Resp Rate Total:  [15 br/min-28 br/min] 23 br/min  PEEP/CPAP:  [5 cmH20] 5 cmH20  Pressure Support:  [10 cmH20] 10 cmH20  Mean Airway Pressure:  [8.8 xdB99-70 cmH20] 8.8 cmH20     Abdominal:      General: There is no distension.      Palpations: Abdomen is soft.      Tenderness: There is no abdominal tenderness.   Genitourinary:     Comments: Glynn in place  Rectal Tube  Skin:     General: Skin is warm and dry.      Comments: Wound vac in place to R thigh/groin to suction   Neurological:      General: No focal deficit present.      Mental Status: She is oriented to person, place, and time.   Psychiatric:         Mood and Affect: Mood normal.         Behavior: Behavior normal.          Significant Labs:  CBC:   Recent Labs   Lab 02/17/24  0311   WBC 12.23   RBC 2.89*   HGB 7.9*   HCT 24.9*      MCV 86   MCH 27.3   MCHC 31.7*     CMP:   Recent Labs   Lab 02/17/24  0311   *   CALCIUM 8.5*   ALBUMIN 1.8*   PROT 7.0      K 4.7   CO2 21*      BUN 36*    CREATININE 2.9*   ALKPHOS 125   ALT 10   AST 28   BILITOT 0.2     All labs within the past 24 hours have been reviewed.     Significant Imaging:  Labs: Reviewed

## 2024-02-17 NOTE — PROGRESS NOTES
[facility]  General Surgery  Progress Note    Subjective:     History of Present Illness:  53 year old morbidly obese female who does not routinely go to the doctor presents to the ED with malaise, nausea, vomiting, and groin, buttock, perineal swelling and tenderness. She began feeling ill a few days ago.     On arrival to the ED she is tachycardic to 120, hypertensive without fever. Labs demonstrate leukocytosis of 21, , with lactic acidosis and associated ALVARADO with cr 3.8, hyperglycemia with blood glucose of 824 accompanied by severe electrolyte derangements. CT imaging obtained demonstrates diffuse subcutaneous air along buttocks, perineum, anterior vulva, as well as bilateral thighs.     No prior abdominal surgeries. She denies problems with her heart or lungs. Non smoker. Does not routinely take any medications.    Post-Op Info:  Procedure(s) (LRB):  Lumbar Puncture (N/A)   1 Day Post-Op     Interval History: NAEO. VSS. Tolerated LP well; will follow up results.     Medications:  Continuous Infusions:   dextrose 10 % in water (D10W)       Scheduled Meds:   sodium chloride 0.9%   Intravenous Once    amLODIPine  10 mg Per OG tube Daily    carvediloL  25 mg Per OG tube BID    cefTRIAXone (Rocephin) IV (PEDS and ADULTS)  2 g Intravenous Daily    famotidine  20 mg Per OG tube Daily    furosemide (LASIX) injection  120 mg Intravenous TID    heparin (porcine)  7,500 Units Subcutaneous Q8H    insulin aspart U-100  6 Units Subcutaneous Q4H    insulin detemir U-100  14 Units Subcutaneous Daily    metronidazole  500 mg Intravenous Q8H    psyllium husk (aspartame)  1 packet Per OG tube BID    sevelamer carbonate  1.6 g Per OG tube TID WM     PRN Meds:0.9%  NaCl infusion (for blood administration), sodium chloride 0.9%, acetaminophen, albuterol-ipratropium, dextrose 10 % in water (D10W), dextrose 10%, dextrose 10%, glucagon (human recombinant), glucose, glucose, hydrALAZINE, insulin aspart  U-100, labetalol, naloxone, ondansetron, oxyCODONE, prochlorperazine, sodium chloride 0.9%, sodium chloride 0.9%, sodium chloride 0.9%     Review of patient's allergies indicates:  No Known Allergies  Objective:     Vital Signs (Most Recent):  Temp: (!) 100.7 °F (38.2 °C) (02/17/24 0700)  Pulse: 97 (02/17/24 0800)  Resp: 19 (02/17/24 0800)  BP: (!) 149/65 (02/17/24 0800)  SpO2: 100 % (02/17/24 0800) Vital Signs (24h Range):  Temp:  [98.3 °F (36.8 °C)-100.7 °F (38.2 °C)] 100.7 °F (38.2 °C)  Pulse:  [] 97  Resp:  [8-40] 19  SpO2:  [97 %-100 %] 100 %  BP: (108-179)/(55-79) 149/65     Weight: (!) 157.9 kg (348 lb)  Body mass index is 57.91 kg/m².    Intake/Output - Last 3 Shifts         02/15 0700  02/16 0659 02/16 0700 02/17 0659 02/17 0700 02/18 0659    I.V. (mL/kg) 38.9 (0.3)      Blood  244     NG/ 460 30    IV Piggyback 802.8 896.4     Total Intake(mL/kg) 1201.7 (7.9) 1600.4 (10.1) 30 (0.2)    Urine (mL/kg/hr) 592 (0.2) 2100 (0.6) 35 (0.2)    Other 474 300 50    Stool 0 0     Total Output 1066 2400 85    Net +135.7 -799.6 -55           Stool Occurrence 1 x 1 x              Physical Exam  Vitals and nursing note reviewed.   Constitutional:       General: She is not in acute distress.     Appearance: She is ill-appearing.   HENT:      Head: Normocephalic and atraumatic.   Eyes:      Extraocular Movements: Extraocular movements intact.      Conjunctiva/sclera: Conjunctivae normal.   Neck:      Comments: Left IJ Trialysis catheter  Cardiovascular:      Rate and Rhythm: Normal rate and regular rhythm.   Pulmonary:      Effort: Pulmonary effort is normal.      Comments: Vent Mode: Spont  Oxygen Concentration (%):  [30] 30  Resp Rate Total:  [13 br/min-31 br/min] 18 br/min  PEEP/CPAP:  [5 cmH20] 5 cmH20  Pressure Support:  [10 cmH20] 10 cmH20  Mean Airway Pressure:  [8.1 qsX28-54 cmH20] 8.3 cmH20      Abdominal:      General: There is no distension.      Palpations: Abdomen is soft.      Tenderness: There is  no abdominal tenderness.   Genitourinary:     Comments: Glynn in place  Rectal Tube  Skin:     General: Skin is warm and dry.      Comments: Wound vac in place to R thigh/groin to suction   Neurological:      General: No focal deficit present.      Mental Status: She is oriented to person, place, and time.   Psychiatric:         Mood and Affect: Mood normal.         Behavior: Behavior normal.          Significant Labs:  I have reviewed all pertinent lab results within the past 24 hours.    Significant Diagnostics:  I have reviewed all pertinent imaging results/findings within the past 24 hours.  Assessment/Plan:     * Ayaz's gangrene  53 y.o. 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     - Tentative plan for OR on Monday for wound vac change  - Last [place] change 2/15  - Will discuss plans for trach/PEG/permacath/stoma in the setting of fevers. No fevers in 24 hours. Likely plan for next week pending family discussions and LP results  - LP 2/16 - FUP results   - Palliative following given overall poor prognosis, daughter would like everything done; MRI slightly improved but no changes to patient's neuro status  - Daily SBTs  - continue ceftriaxone/flagyl for UCx coverage   - Okay for DVT ppx   - Remainder of care per SICU        Celia Allison MD  General Surgery  [facility]

## 2024-02-17 NOTE — PROGRESS NOTES
[facility]  Critical Care - Surgery  Progress Note    Patient Name: Sarah Saravia  MRN: [id number]  Admission Date: 1/29/2024  Hospital Length of Stay: 19 days  Code Status: Full Code  Attending Provider: Steve Cole MD  [facility]   Principal Problem: Ayaz's gangrene    Subjective:     Interval History/Significant Events: NAEON. Hydralazine and oxycodone 5mg given. UOP is improving after lasix challenge.     Follow-up For: Procedure(s) (LRB):  REPLACEMENT, WOUND VAC (N/A)    Post-Operative Day: 1 Day Post-Op    Objective:     Vital Signs (Most Recent):  Temp: 98 °F (36.7 °C) (02/16/24 0530)  Pulse: 81 (02/16/24 0630)  Resp: 17 (02/16/24 0630)  BP: 128/62 (02/16/24 0630)  SpO2: 100 % (02/16/24 0630) Vital Signs (24h Range):  Temp:  [98 °F (36.7 °C)-99.8 °F (37.7 °C)] 98 °F (36.7 °C)  Pulse:  [80-95] 81  Resp:  [12-28] 17  SpO2:  [97 %-100 %] 100 %  BP: (117-173)/() 128/62     Weight: (!) 152.9 kg (337 lb)  Body mass index is 56.08 kg/m².      Intake/Output Summary (Last 24 hours) at 2/16/2024 0656  Last data filed at 2/16/2024 0600  Gross per 24 hour   Intake 1201.74 ml   Output 1066 ml   Net 135.74 ml          Physical Exam     Appearance: She is obese.   HENT:      Head: Normocephalic.      Mouth/Throat:      Mouth: Mucous membranes are moist.   Eyes:      Comments: PERRL, no gaze  Neck:      Comments: LIJ Trialysis  Cardiovascular:      Rate and Rhythm: Normal rate and regular rhythm.   Pulmonary:      Effort: Pulmonary effort is normal.      Comments: Intubated  Abdominal:      General: Abdomen is flat.      Palpations: Abdomen is soft.   Genitourinary:     Comments: R groin wound   Glynn in place  Rectal tube  Musculoskeletal:       BLE nonedematous   Skin:     General: Skin is warm.   Neurological:      Comments: eye opening to pressure. Abnormal flexion per GCS          Vents:  Vent Mode: Spont (02/16/24 3783)  Set Rate:  18 BPM (02/06/24 0349)  Vt Set: 420 mL (02/06/24 0349)  Pressure Support: 10 cmH20 (02/16/24 0348)  PEEP/CPAP: 5 cmH20 (02/16/24 0348)  Oxygen Concentration (%): 30 (02/16/24 0630)  Peak Airway Pressure: 15 cmH20 (02/16/24 0348)  Plateau Pressure: 15 cmH20 (02/16/24 0348)  Total Ve: 7.3 L/m (02/16/24 0348)  Negative Inspiratory Force (cm H2O): 0 (02/16/24 0348)  F/VT Ratio<105 (RSBI): (!) 37.5 (02/16/24 0348)    Lines/Drains/Airways       Central Venous Catheter Line  Duration             Trialysis (Dialysis) Catheter 02/11/24 2303 left internal jugular 4 days              Drain  Duration                  Rectal Tube 02/04/24 1545 11 days         NG/OG Tube 02/06/24 1030 Center mouth 9 days         Urethral Catheter 02/06/24 1900 9 days              Airway  Duration                  Airway - Non-Surgical 01/31/24 1020 15 days              Peripheral Intravenous Line  Duration                  Peripheral IV - Single Lumen 02/13/24 1132 18 G;1 3/4 in Right Forearm 2 days                    Significant Labs:    CBC/Anemia Profile:  Recent Labs   Lab 02/15/24  0414 02/16/24  0314   WBC 11.60 11.35   HGB 7.1* 6.6*   HCT 22.9* 21.5*    312   MCV 88 87   RDW 18.1* 18.0*        Chemistries:  Recent Labs   Lab 02/15/24  0414 02/15/24  1429 02/15/24  2206 02/16/24 0314    136  136 134*  134* 135*   K 4.6 5.2*  5.2* 5.2*  5.2* 4.9    104  104 105  105 104   CO2 23 23  23 22*  22* 23   BUN 27* 25*  25* 27*  27* 30*   CREATININE 2.2* 2.2*  2.2* 2.5*  2.5* 2.6*   CALCIUM 7.9* 8.3*  8.3* 8.4*  8.4* 8.4*   ALBUMIN 1.7* 1.8*  1.8* 1.7*  1.7* 1.7*   PROT 6.9  --   --  6.8   BILITOT 0.2  --   --  0.2   ALKPHOS 108  --   --  101   ALT 11  --   --  9*   AST 27  --   --  25   MG 1.8 2.4  2.4 2.2  2.2 2.2   PHOS 4.1 4.7*  4.7* 4.7*  4.7* 4.9*       Significant Imaging:  N/A  Assessment/Plan:     Renal/  * Ayaz's gangrene    Neuro/Psych:   #Acute Encephalopathy  CT 2/3 with scattered  hypoattenuation likely representing age indeterminate infarcts. EEG findings consistent with moderate-severe encephalopathy. MRI 2/6: Several scattered punctate acute infarcts throughout the bilateral frontal lobes, centrum semiovale, basal ganglia, corpus callosum, and cerebellar hemispheres.  CTA 2/6: Atherosclerotic plaquing of the carotid bifurcations and proximal ICAs with less than 50% proximal ICA stenosis by NASCET criteria . Repeat CTH and MRI/MRA unchanged from prior exams. S/p multiple wound vac exchanges. Palliative onboard with family wanting full measures.    Neuro/Psych  Repeat CTH and MRI/MRA stable from initial reads. LP 2/16. Elevated WBCs and protein consistent with bacterial meningitis.  -- Sedation: None  -- Pain: kermit tylenol, dialudid and oxy prn  -- GCS 4T: E2, V1T, M1; exam stable  -- Neurology following; appreciate recs  -- Neurovascular following; appreciate recs  -- Palliative following; GOC conversation 2/7; appreciate recs  -- F/u LP cultures.              Cards:   -- HDS  -- goal SBP < 180, MAP >65  -- hypertensive, hydralazine and labetalol prns  -- Continue amlodipine 10mg daily; coreg 25mg BID      Pulm:   #Acute Respiratory Failure  -- Intubated on spontaneous , Pressure support.  -- Goal O2 sat > 90%  -- Trach/PEG next week       Renal:  #ALVARADO on CKD  #ATN  Received HD 2/9. 2/13 LIJ trialysis  -- Nephrology following; appreciate recs  -- RRT as needed     FEN / GI:   -- Daily CMPs  -- NGT in place   -- Replace lytes as needed  -- Nutrition: TFs  -- GI PPX   -- Nutrition following; appreciate recs  -- Continue psyllium      ID:    #Ayaz's gangrene  s/p wound vac exchange/ debridement x4 for nec fascitis. R groin tissue with proteus, sensitive to ceftriaxone. Growing bacteroids as well.   -- Abx: ceftriaxone and flagyl EOT 2/22  -- ID following ; appreciate recs  -- Wound vac exchange Monday 2/19       Heme/Onc:  #Anemia  -- Daily CBC  -- Heparin DVT ppx  -- Transfuse if Hgb  <7     Endo:   #IDDM  Initially presented to OSH with DKA with latest A1C 10.4 AG Gap resolved  Placed on insulin gtt 2/3 and dced 2/12.    - q4h BG monitoring while NPO  - Detemir 12 units daily  - Novolog 6units q4h while on TFs  - Moderate dose SSI PRN  - Endocrine following, appreciate recs      PPx:   Feeding: Tube Feeds  Analgesia/Sedation: See above  Thromboembolic prevention: SQH   HOB >30: Y  Stress Ulcer ppx: Famotidine  Glucose control: Goal 140-180 g/dl, kermit detemir and novolog, SSI, Endo following  Bowel reg: psyllium  Invasive Lines/Drains/Airway: Glynn, ETT, LIJ Trialysis, PIV x2, Rectal tube      Dispo/Code Status/Palliative:   -- SICU / Full Code   -- Pending LP cultures  -- Wound vac exchange Monday       David Stokes MD  Critical Care - Surgery  [facility]

## 2024-02-17 NOTE — ASSESSMENT & PLAN NOTE
53 y.o. 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     - Tentative plan for OR on Monday for wound vac change  - Last [place] change 2/15  - Will discuss plans for trach/PEG/permacath/stoma in the setting of fevers. No fevers in 24 hours. Likely plan for next week pending family discussions and LP results  - LP 2/16 - FUP results   - Palliative following given overall poor prognosis, daughter would like everything done; MRI slightly improved but no changes to patient's neuro status  - Daily SBTs  - continue ceftriaxone/flagyl for UCx coverage   - Okay for DVT ppx   - Remainder of care per SICU

## 2024-02-17 NOTE — PROGRESS NOTES
"[facility]  Endocrinology  Progress Note    Admit Date: 1/29/2024     Reason for Consult: Management of T2DM, Hyperglycemia     Surgical Procedure and Date: n/a    Diabetes diagnosis year: new onset     Home Diabetes Medications:  none per chart review and family present at bedside     Lab Results   Component Value Date    HGBA1C 10.4 (H) 01/30/2024       Diabetes Complications include:     Hyperglycemia, DKA at OSH     Complicating diabetes co morbidities:   Obesity       HPI:   Patient is a 53 y.o. female with a diagnosis of obesity (BMI 53) admitted with N/V and R groin wound found to be in DKA at OSH. 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 While at OSH pt developed acute respiratory failure requiring intubation. Pulmonology was consulted for ventilator management and critical illness. Nephrology was consulted for worsening vishal in the setting of lactic acidosis and septic shock likely ATN, sCr elevated at 3.8 on admit now 4.0 post HD. Pt being admitted to SICU for surgical critical care management. Immediate plans include hemodynamic stabilization, weaning of respiratory support, and RRT.  Endocrinology consulted for management of T2DM.                 Interval HPI:   Overnight events: Remains in SICU intubated. BG well controlled and within goal ranges on current SQ insulin regimen. Plan for OR Monday for wound vac exchange. Creatinine 2.9.   Eating:   NPO  Nausea: No  Hypoglycemia and intervention: No  Fever: No  TPN and/or TF: Yes  If yes, type of TF/TPN and rate: TFs at 30 ml/hr     BP (!) 149/65 (BP Location: Left arm, Patient Position: Lying)   Pulse 97   Temp (!) 100.7 °F (38.2 °C) (Oral)   Resp 19   Ht 5' 5" (1.651 m)   Wt (!) 157.9 kg (348 lb)   LMP 01/01/2024 (Approximate) Comment: tubal ligation  SpO2 100%   BMI 57.91 kg/m²     Labs Reviewed and Include    Recent Labs " "  Lab 02/17/24  0311   *   CALCIUM 8.5*   ALBUMIN 1.8*   PROT 7.0      K 4.7   CO2 21*      BUN 36*   CREATININE 2.9*   ALKPHOS 125   ALT 10   AST 28   BILITOT 0.2     Lab Results   Component Value Date    WBC 12.23 02/17/2024    HGB 7.9 (L) 02/17/2024    HCT 24.9 (L) 02/17/2024    MCV 86 02/17/2024     02/17/2024     No results for input(s): "TSH", "FREET4" in the last 168 hours.  Lab Results   Component Value Date    HGBA1C 10.4 (H) 01/30/2024       Nutritional status:   Body mass index is 57.91 kg/m².  Lab Results   Component Value Date    ALBUMIN 1.8 (L) 02/17/2024    ALBUMIN 1.8 (L) 02/16/2024    ALBUMIN 1.8 (L) 02/16/2024     No results found for: "PREALBUMIN"    Estimated Creatinine Clearance: 34.5 mL/min (A) (based on SCr of 2.9 mg/dL (H)).    Accu-Checks  Recent Labs     02/15/24  2003 02/15/24  2351 02/16/24  0432 02/16/24  0743 02/16/24  0902 02/16/24  1320 02/16/24 2010 02/16/24  2359 02/17/24  0406 02/17/24  0752   POCTGLUCOSE 144* 152* 120* 125* 124* 140* 124* 115* 132* 137*       Current Medications and/or Treatments Impacting Glycemic Control  Immunotherapy:    Immunosuppressants       None          Steroids:   Hormones (From admission, onward)      None          Pressors:    Autonomic Drugs (From admission, onward)      None          Hyperglycemia/Diabetes Medications:   Antihyperglycemics (From admission, onward)      Start     Stop Route Frequency Ordered    02/13/24 0915  insulin detemir U-100 (Levemir) pen 14 Units         -- SubQ Daily 02/13/24 0906    02/09/24 1200  insulin aspart U-100 pen 6 Units         -- SubQ Every 4 hours 02/09/24 0901    02/03/24 1010  insulin aspart U-100 pen 0-10 Units         -- SubQ Every 4 hours PRN 02/03/24 0911            ASSESSMENT and PLAN    Renal/  * Ayaz's gangrene  Managed per primary team  Optimize BG control        ALVARADO (acute kidney injury)  Lab Results   Component Value Date    CREATININE 2.9 (H) 02/17/2024     Avoid " insulin stacking  Titrate insulin slowly       Endocrine  Morbid (severe) obesity due to excess calories  Body mass index is 57.91 kg/m².  May increase insulin resistance.         New onset type 2 diabetes mellitus  BG goal 140-180  No previous hx of T2DM per family at bedside. A1c of 10.4. DKA at OSH. TF off. BG stable on regimen.      Plan:  - Continue Levemir 14 units daily.  - Continue Novolog 6 units q 4 hrs while on tube feeding (HOLD if tube feeding is stopped or BG < 100)   - Continue Moderate Dose Correction Scale  - BG monitoring q 4 hrs while NPO /TF    ** Please call Endocrine for any BG related issues **      Discharge plans: TBD    Lab Results   Component Value Date    HGBA1C 10.4 (H) 01/30/2024             Zoie Muñiz NP  Endocrinology  [facility]

## 2024-02-17 NOTE — PROGRESS NOTES
[facility]  Nephrology  Progress Note    Patient Name: Sarah Saravia  MRN: [id number]  Admission Date: 1/29/2024  Hospital Length of Stay: 19 days  Attending Provider: Steve Cole MD   Primary Care Physician: [facility]  Principal Problem:Ayaz's gangrene    Subjective:     HPI: Sarha Saravia is a 53 year old female with a past medical history of obesity (BMI 53) admitted with N/V and R groin wound found to be in DKA at OSH.  She underwent a CT abdomen and pelvis that showed extensive soft tissue air throughout the right groin and inguinal region and extending to involve the right lower quadrant anterior abdominal wall with extensive soft tissue air also seen involving the proximal medial aspect of the right thigh, concerning for gas-forming infection. She is s/p OR debridement for necrotizing fascitis on 1/29/24 and take back for 1/31/24. Right groin tissue growing proteus, susceptibilities still pending. Currently on meropenem and clindamycin which was d/c'd on 1/31/24. While at OSH pt developed acute respiratory failure requiring intubation. Nephrology was consulted for worsening alvarado in the setting of lactic acidosis and septic shock likely ATN, sCr elevated at 3.8 on admit.  OR today for debridement with possible closure and wound vac placement. Last HD 2/1. Net -1.3L. Electrolytes stable. Nephrology consulted for ALVARADO.     Interval History:     No acute events overnight. Patient remains intubated, UOP ~2,070 on IV lasix 120mg TID. Tolerated Lasix well overnight, labs stable.     Review of patient's allergies indicates:  No Known Allergies  Current Facility-Administered Medications   Medication Frequency    0.9%  NaCl infusion (for blood administration) Q24H PRN    0.9%  NaCl infusion PRN    0.9%  NaCl infusion Once    acetaminophen tablet 650 mg Q4H PRN    albuterol-ipratropium 2.5 mg-0.5 mg/3 mL nebulizer solution 3 mL Q4H PRN    amLODIPine tablet 10  mg Daily    carvediloL tablet 25 mg BID    cefTRIAXone (ROCEPHIN) 2 g in dextrose 5 % in water (D5W) 100 mL IVPB (MB+) Daily    dextrose 10 % infusion Continuous PRN    dextrose 10% bolus 125 mL 125 mL PRN    dextrose 10% bolus 250 mL 250 mL PRN    famotidine tablet 20 mg Daily    furosemide injection 120 mg TID    glucagon (human recombinant) injection 1 mg PRN    glucose chewable tablet 16 g PRN    glucose chewable tablet 24 g PRN    heparin (porcine) injection 7,500 Units Q8H    hydrALAZINE injection 10 mg Q4H PRN    insulin aspart U-100 pen 0-10 Units Q4H PRN    insulin aspart U-100 pen 6 Units Q4H    insulin detemir U-100 (Levemir) pen 14 Units Daily    labetalol 20 mg/4 mL (5 mg/mL) IV syring Q6H PRN    metronidazole IVPB 500 mg Q8H    naloxone 0.4 mg/mL injection 0.02 mg PRN    ondansetron injection 4 mg Q6H PRN    oxyCODONE 5 mg/5 mL solution 5 mg Q6H PRN    prochlorperazine injection Soln 5 mg Q6H PRN    psyllium husk (aspartame) 3.4 gram packet 1 packet BID    sevelamer carbonate pwpk 1.6 g TID WM    sodium chloride 0.9% bolus 250 mL 250 mL PRN    sodium chloride 0.9% bolus 250 mL 250 mL PRN    sodium chloride 0.9% flush 10 mL PRN       Objective:     Vital Signs (Most Recent):  Temp: (!) 100.7 °F (38.2 °C) (02/17/24 0700)  Pulse: 86 (02/17/24 1000)  Resp: 19 (02/17/24 1000)  BP: (!) 118/57 (02/17/24 1000)  SpO2: 100 % (02/17/24 1000) Vital Signs (24h Range):  Temp:  [98.3 °F (36.8 °C)-100.7 °F (38.2 °C)] 100.7 °F (38.2 °C)  Pulse:  [] 86  Resp:  [8-40] 19  SpO2:  [97 %-100 %] 100 %  BP: (116-179)/(55-79) 118/57     Weight: (!) 157.9 kg (348 lb) (02/17/24 0300)  Body mass index is 57.91 kg/m².  Body surface area is 2.69 meters squared.    I/O last 3 completed shifts:  In: 2118.8 [Blood:244; NG/GT:780; IV Piggyback:1094.8]  Out: 2847 [Urine:2397; Other:450]    Physical Exam  Vitals and nursing note reviewed.   Constitutional:       General: She is not in acute distress.     Appearance: She is  ill-appearing.   HENT:      Head: Normocephalic and atraumatic.   Eyes:      Extraocular Movements: Extraocular movements intact.      Conjunctiva/sclera: Conjunctivae normal.   Neck:      Comments: Left IJ Trialysis catheter  Cardiovascular:      Rate and Rhythm: Normal rate and regular rhythm.   Pulmonary:      Effort: Pulmonary effort is normal.      Comments: Vent Mode: Spont  Oxygen Concentration (%):  [30] 30  Resp Rate Total:  [15 br/min-28 br/min] 23 br/min  PEEP/CPAP:  [5 cmH20] 5 cmH20  Pressure Support:  [10 cmH20] 10 cmH20  Mean Airway Pressure:  [8.8 ayF61-97 cmH20] 8.8 cmH20     Abdominal:      General: There is no distension.      Palpations: Abdomen is soft.      Tenderness: There is no abdominal tenderness.   Genitourinary:     Comments: Glynn in place  Rectal Tube  Skin:     General: Skin is warm and dry.      Comments: Wound vac in place to R thigh/groin to suction   Neurological:      General: No focal deficit present.      Mental Status: She is oriented to person, place, and time.   Psychiatric:         Mood and Affect: Mood normal.         Behavior: Behavior normal.          Significant Labs:  CBC:   Recent Labs   Lab 02/17/24  0311   WBC 12.23   RBC 2.89*   HGB 7.9*   HCT 24.9*      MCV 86   MCH 27.3   MCHC 31.7*     CMP:   Recent Labs   Lab 02/17/24  0311   *   CALCIUM 8.5*   ALBUMIN 1.8*   PROT 7.0      K 4.7   CO2 21*      BUN 36*   CREATININE 2.9*   ALKPHOS 125   ALT 10   AST 28   BILITOT 0.2     All labs within the past 24 hours have been reviewed.     Significant Imaging:  Labs: Reviewed  Assessment/Plan:     Renal/  * Ayaz's gangrene  - management per primary     ALVARADO (acute kidney injury)  Transfer from [facility]. Admitted for fourniers gangrene. Initiated HD on 1/31. ALVARADO 2/2 ATN in setting of septic shock. Arrived with CR 3.8. Unknown baseline. S/P OR debridement with possible closure and wound vac placement     ALVARADO most likley ATN in setting of  septic shock     Plan/Recommendation  -No urgent indications for RRT at this time. Labs stable, will monitor need on daily basis.   - Continue IV lasix 120mg TID.   -Daily RFP   -Strict I&Os  -Avoid nephrotoxins, if possible     ID  Severe sepsis  -        Thank you for your consult. I will follow-up with patient. Please contact us if you have any additional questions.    Mathew Steinberg MD  Nephrology  [facility]

## 2024-02-17 NOTE — SUBJECTIVE & OBJECTIVE
"Interval HPI:   Overnight events: Remains in SICU intubated. BG well controlled and within goal ranges on current SQ insulin regimen. Plan for OR Monday for wound vac exchange. Creatinine 2.9.   Eating:   NPO  Nausea: No  Hypoglycemia and intervention: No  Fever: No  TPN and/or TF: Yes  If yes, type of TF/TPN and rate: TFs at 30 ml/hr     BP (!) 149/65 (BP Location: Left arm, Patient Position: Lying)   Pulse 97   Temp (!) 100.7 °F (38.2 °C) (Oral)   Resp 19   Ht 5' 5" (1.651 m)   Wt (!) 157.9 kg (348 lb)   LMP 01/01/2024 (Approximate) Comment: tubal ligation  SpO2 100%   BMI 57.91 kg/m²     Labs Reviewed and Include    Recent Labs   Lab 02/17/24  0311   *   CALCIUM 8.5*   ALBUMIN 1.8*   PROT 7.0      K 4.7   CO2 21*      BUN 36*   CREATININE 2.9*   ALKPHOS 125   ALT 10   AST 28   BILITOT 0.2     Lab Results   Component Value Date    WBC 12.23 02/17/2024    HGB 7.9 (L) 02/17/2024    HCT 24.9 (L) 02/17/2024    MCV 86 02/17/2024     02/17/2024     No results for input(s): "TSH", "FREET4" in the last 168 hours.  Lab Results   Component Value Date    HGBA1C 10.4 (H) 01/30/2024       Nutritional status:   Body mass index is 57.91 kg/m².  Lab Results   Component Value Date    ALBUMIN 1.8 (L) 02/17/2024    ALBUMIN 1.8 (L) 02/16/2024    ALBUMIN 1.8 (L) 02/16/2024     No results found for: "PREALBUMIN"    Estimated Creatinine Clearance: 34.5 mL/min (A) (based on SCr of 2.9 mg/dL (H)).    Accu-Checks  Recent Labs     02/15/24  2003 02/15/24  2351 02/16/24  0432 02/16/24  0743 02/16/24  0902 02/16/24  1320 02/16/24 2010 02/16/24  2359 02/17/24  0406 02/17/24  0752   POCTGLUCOSE 144* 152* 120* 125* 124* 140* 124* 115* 132* 137*       Current Medications and/or Treatments Impacting Glycemic Control  Immunotherapy:    Immunosuppressants       None          Steroids:   Hormones (From admission, onward)      None          Pressors:    Autonomic Drugs (From admission, onward)      None      "     Hyperglycemia/Diabetes Medications:   Antihyperglycemics (From admission, onward)      Start     Stop Route Frequency Ordered    02/13/24 0915  insulin detemir U-100 (Levemir) pen 14 Units         -- SubQ Daily 02/13/24 0906    02/09/24 1200  insulin aspart U-100 pen 6 Units         -- SubQ Every 4 hours 02/09/24 0901    02/03/24 1010  insulin aspart U-100 pen 0-10 Units         -- SubQ Every 4 hours PRN 02/03/24 0911

## 2024-02-17 NOTE — ASSESSMENT & PLAN NOTE
Lab Results   Component Value Date    CREATININE 2.9 (H) 02/17/2024     Avoid insulin stacking  Titrate insulin slowly

## 2024-02-17 NOTE — PLAN OF CARE
"      SICU PLAN OF CARE NOTE    Dx: Ayaz's gangrene    Shift Events: No acute events overnight. X1 BM overnight. TF running per order. Pt tolerating well. Abx infusing per MAR.     Goals of Care: MAP >65, SBP <170    Neuro: responds to pain. Withdraws in all extremities except LUE. Pupils 4 equal, round, and reactive. Corneal, cough, and gag, reflexes in place. Pt spontaneously moving feet, not following commands.    Vital Signs: BP (!) 162/72 (BP Location: Left forearm, Patient Position: Lying)   Pulse 95   Temp 99 °F (37.2 °C) (Oral)   Resp 19   Ht 5' 5" (1.651 m)   Wt (!) 157.9 kg (348 lb)   LMP 01/01/2024 (Approximate) Comment: tubal ligation  SpO2 100%   BMI 57.91 kg/m²     Cardiac: NSR 90s    Respiratory: Ventilator PS Spont 30% 5PEEP    Diet: NPO and Tube Feeds    Gtts: Abx    Urine Output: Urinary Catheter 125 cc/hour    Drains:   R groin WV x2 CDI putting out serosanguinous drainage. No alarms noted overnight. Seal intact.  OG w/ TF running at 30mls/hr. Pt tolerated well.     Labs/Accuchecks: Daily labs. Renal and Mag q8hrs. Accuchecks q4hrs. Coverage given per MAR.    Skin: See LDA for lines/drains. No new breakdown noted. Foams in place. Pt turned q2hrs. Pt in specialty bed.      "

## 2024-02-17 NOTE — SUBJECTIVE & OBJECTIVE
Interval History: NAEO. VSS. Tolerated LP well; will follow up results.     Medications:  Continuous Infusions:   dextrose 10 % in water (D10W)       Scheduled Meds:   sodium chloride 0.9%   Intravenous Once    amLODIPine  10 mg Per OG tube Daily    carvediloL  25 mg Per OG tube BID    cefTRIAXone (Rocephin) IV (PEDS and ADULTS)  2 g Intravenous Daily    famotidine  20 mg Per OG tube Daily    furosemide (LASIX) injection  120 mg Intravenous TID    heparin (porcine)  7,500 Units Subcutaneous Q8H    insulin aspart U-100  6 Units Subcutaneous Q4H    insulin detemir U-100  14 Units Subcutaneous Daily    metronidazole  500 mg Intravenous Q8H    psyllium husk (aspartame)  1 packet Per OG tube BID    sevelamer carbonate  1.6 g Per OG tube TID WM     PRN Meds:0.9%  NaCl infusion (for blood administration), sodium chloride 0.9%, acetaminophen, albuterol-ipratropium, dextrose 10 % in water (D10W), dextrose 10%, dextrose 10%, glucagon (human recombinant), glucose, glucose, hydrALAZINE, insulin aspart U-100, labetalol, naloxone, ondansetron, oxyCODONE, prochlorperazine, sodium chloride 0.9%, sodium chloride 0.9%, sodium chloride 0.9%     Review of patient's allergies indicates:  No Known Allergies  Objective:     Vital Signs (Most Recent):  Temp: (!) 100.7 °F (38.2 °C) (02/17/24 0700)  Pulse: 97 (02/17/24 0800)  Resp: 19 (02/17/24 0800)  BP: (!) 149/65 (02/17/24 0800)  SpO2: 100 % (02/17/24 0800) Vital Signs (24h Range):  Temp:  [98.3 °F (36.8 °C)-100.7 °F (38.2 °C)] 100.7 °F (38.2 °C)  Pulse:  [] 97  Resp:  [8-40] 19  SpO2:  [97 %-100 %] 100 %  BP: (108-179)/(55-79) 149/65     Weight: (!) 157.9 kg (348 lb)  Body mass index is 57.91 kg/m².    Intake/Output - Last 3 Shifts         02/15 0700 02/16 0659 02/16 0700 02/17 0659 02/17 0700 02/18 0659    I.V. (mL/kg) 38.9 (0.3)      Blood  244     NG/ 460 30    IV Piggyback 802.8 896.4     Total Intake(mL/kg) 1201.7 (7.9) 1600.4 (10.1) 30 (0.2)    Urine (mL/kg/hr) 592  (0.2) 2100 (0.6) 35 (0.2)    Other 474 300 50    Stool 0 0     Total Output 1066 2400 85    Net +135.7 -799.6 -55           Stool Occurrence 1 x 1 x              Physical Exam  Vitals and nursing note reviewed.   Constitutional:       General: She is not in acute distress.     Appearance: She is ill-appearing.   HENT:      Head: Normocephalic and atraumatic.   Eyes:      Extraocular Movements: Extraocular movements intact.      Conjunctiva/sclera: Conjunctivae normal.   Neck:      Comments: Left IJ Trialysis catheter  Cardiovascular:      Rate and Rhythm: Normal rate and regular rhythm.   Pulmonary:      Effort: Pulmonary effort is normal.      Comments: Vent Mode: Spont  Oxygen Concentration (%):  [30] 30  Resp Rate Total:  [13 br/min-31 br/min] 18 br/min  PEEP/CPAP:  [5 cmH20] 5 cmH20  Pressure Support:  [10 cmH20] 10 cmH20  Mean Airway Pressure:  [8.1 rgZ96-89 cmH20] 8.3 cmH20      Abdominal:      General: There is no distension.      Palpations: Abdomen is soft.      Tenderness: There is no abdominal tenderness.   Genitourinary:     Comments: Glynn in place  Rectal Tube  Skin:     General: Skin is warm and dry.      Comments: Wound vac in place to R thigh/groin to suction   Neurological:      General: No focal deficit present.      Mental Status: She is oriented to person, place, and time.   Psychiatric:         Mood and Affect: Mood normal.         Behavior: Behavior normal.          Significant Labs:  I have reviewed all pertinent lab results within the past 24 hours.    Significant Diagnostics:  I have reviewed all pertinent imaging results/findings within the past 24 hours.

## 2024-02-17 NOTE — ASSESSMENT & PLAN NOTE
Transfer from [facility]. Admitted for fourniers gangrene. Initiated HD on 1/31. ALVARADO 2/2 ATN in setting of septic shock. Arrived with CR 3.8. Unknown baseline. S/P OR debridement with possible closure and wound vac placement     ALVARADO most likley ATN in setting of septic shock     Plan/Recommendation  -No urgent indications for RRT at this time. Labs stable, will monitor need on daily basis.   - Continue IV lasix 120mg TID.   -Daily RFP   -Strict I&Os  -Avoid nephrotoxins, if possible

## 2024-02-17 NOTE — CARE UPDATE
Patient's chart was reviewed by a stroke team provider and discussed with staff.    Briefly, Sarah Saravia is a 53 y.o. female with PMHx obesity currently admitted to surgical ICU as transfer from  for necrotizing fascitis s/p surgical debirdement (1/30/24) and Vascular Neurology consulted for multi territory infarcts as well as abnormal restricted diffusion on L temporal lobe concerning for possible infarct/septic emboli. Patient is currently s/p surgical debridement of R groin nec fasc w + cx proteus as well as s/p wound vac. Gen Neuro originally consulted due to worsening neuro exam on 2/3 and CTH showed  hypoattenuations at left anterior temporal lobe w/encephalomalacia, and bilateral centrum semiovale, left anterior corpus callosum, right caudate, subinsular region. In the interim patient having worsening leukocytosis up to 31K today while on CTX and metronidazole with clear cultures. MRI brain 2/5/24 showed several scattered punctate acute infarcts at BL frontal lobes, centrum semiovale, basal ganglia, corpus callosum, and cerebellar hemispheres as well as focal locunar area of diffusion restriction at L temporal lobe.        MRI/MRA completed, showing left temporal lobe lesion improving and less prominent. MRV was negative for venous sinus thrombosis. At this time, differentials include septic embolic vs inflammatory vs hypercoagulopathy. No new recommendations at this time. Vascular Neurology will sign off at this time, thank you for involving us in the care of this patient. Please do not hesitate to contact the stroke team for any questions or concerns.             Eve Cameron PA-C  Department of Vascular Neurology  [facility]  [facility]  [phone]

## 2024-02-17 NOTE — PLAN OF CARE
SICU PLAN OF CARE NOTE    Dx: Ayaz's gangrene    Goals of Care:   MAP >65  SBP< 170    Vital Signs (last 12 hours):   Temp:  [98.6 °F (37 °C)]   Pulse:  [76-89]   Resp:  [8-40]   BP: (108-161)/(55-79)   SpO2:  [98 %-100 %]      Neuro: Withdraw    Cardiac: NSR    Respiratory: Ventilator Spont 30% FiO2/5 PEEp    Urine Output: Urinary Catheter 685 cc/shift    Drains: Wound Vac, total output 100 / shift    Diet: Tube Feeds @ 10cc/hr    Labs/Accuchecks: CRRT    Shift Events:  LP today in IR proned; pt tolerated well. Lasix 120mg TID ordered; pt slightly responsive (see flowsheets). Glynn exchanged; UA with reflex to culture sent. NSR. Afebrile. Bmx1. See flowsheet for further assessment/details.  Family updated on current condition/plan of care, questions answered, and emotional support provided.  MD updated on current condition, vitals, labs, and gtts.

## 2024-02-18 LAB
ALBUMIN SERPL BCP-MCNC: 1.8 G/DL (ref 3.5–5.2)
ALLENS TEST: ABNORMAL
ALP SERPL-CCNC: 125 U/L (ref 55–135)
ALT SERPL W/O P-5'-P-CCNC: 10 U/L (ref 10–44)
ANION GAP SERPL CALC-SCNC: 10 MMOL/L (ref 8–16)
AST SERPL-CCNC: 22 U/L (ref 10–40)
BACTERIA UR CULT: ABNORMAL
BASOPHILS # BLD AUTO: 0.06 K/UL (ref 0–0.2)
BASOPHILS NFR BLD: 0.6 % (ref 0–1.9)
BILIRUB SERPL-MCNC: 0.2 MG/DL (ref 0.1–1)
BUN SERPL-MCNC: 44 MG/DL (ref 6–20)
BUN SERPL-MCNC: 46 MG/DL (ref 6–20)
BUN SERPL-MCNC: 46 MG/DL (ref 6–20)
BUN SERPL-MCNC: 47 MG/DL (ref 6–20)
CA-I BLDV-SCNC: 1.1 MMOL/L (ref 1.06–1.42)
CA-I BLDV-SCNC: 1.11 MMOL/L (ref 1.06–1.42)
CALCIUM SERPL-MCNC: 8.5 MG/DL (ref 8.7–10.5)
CALCIUM SERPL-MCNC: 8.5 MG/DL (ref 8.7–10.5)
CALCIUM SERPL-MCNC: 8.8 MG/DL (ref 8.7–10.5)
CALCIUM SERPL-MCNC: 8.8 MG/DL (ref 8.7–10.5)
CHLORIDE SERPL-SCNC: 103 MMOL/L (ref 95–110)
CHLORIDE SERPL-SCNC: 103 MMOL/L (ref 95–110)
CHLORIDE SERPL-SCNC: 105 MMOL/L (ref 95–110)
CHLORIDE SERPL-SCNC: 105 MMOL/L (ref 95–110)
CO2 SERPL-SCNC: 21 MMOL/L (ref 23–29)
CO2 SERPL-SCNC: 22 MMOL/L (ref 23–29)
CREAT SERPL-MCNC: 3.5 MG/DL (ref 0.5–1.4)
CREAT SERPL-MCNC: 3.6 MG/DL (ref 0.5–1.4)
CREAT SERPL-MCNC: 3.9 MG/DL (ref 0.5–1.4)
CREAT SERPL-MCNC: 3.9 MG/DL (ref 0.5–1.4)
DIFFERENTIAL METHOD BLD: ABNORMAL
EOSINOPHIL # BLD AUTO: 0.6 K/UL (ref 0–0.5)
EOSINOPHIL NFR BLD: 5.4 % (ref 0–8)
ERYTHROCYTE [DISTWIDTH] IN BLOOD BY AUTOMATED COUNT: 17.3 % (ref 11.5–14.5)
EST. GFR  (NO RACE VARIABLE): 13.2 ML/MIN/1.73 M^2
EST. GFR  (NO RACE VARIABLE): 13.2 ML/MIN/1.73 M^2
EST. GFR  (NO RACE VARIABLE): 14.5 ML/MIN/1.73 M^2
EST. GFR  (NO RACE VARIABLE): 15 ML/MIN/1.73 M^2
GLUCOSE SERPL-MCNC: 128 MG/DL (ref 70–110)
GLUCOSE SERPL-MCNC: 128 MG/DL (ref 70–110)
GLUCOSE SERPL-MCNC: 132 MG/DL (ref 70–110)
GLUCOSE SERPL-MCNC: 144 MG/DL (ref 70–110)
HCO3 UR-SCNC: 23.6 MMOL/L (ref 24–28)
HCT VFR BLD AUTO: 23.9 % (ref 37–48.5)
HGB BLD-MCNC: 7.4 G/DL (ref 12–16)
IMM GRANULOCYTES # BLD AUTO: 0.05 K/UL (ref 0–0.04)
IMM GRANULOCYTES NFR BLD AUTO: 0.5 % (ref 0–0.5)
LYMPHOCYTES # BLD AUTO: 1.8 K/UL (ref 1–4.8)
LYMPHOCYTES NFR BLD: 17.4 % (ref 18–48)
MAGNESIUM SERPL-MCNC: 1.8 MG/DL (ref 1.6–2.6)
MAGNESIUM SERPL-MCNC: 1.9 MG/DL (ref 1.6–2.6)
MCH RBC QN AUTO: 27.2 PG (ref 27–31)
MCHC RBC AUTO-ENTMCNC: 31 G/DL (ref 32–36)
MCV RBC AUTO: 88 FL (ref 82–98)
MONOCYTES # BLD AUTO: 1.2 K/UL (ref 0.3–1)
MONOCYTES NFR BLD: 11.8 % (ref 4–15)
NEUTROPHILS # BLD AUTO: 6.5 K/UL (ref 1.8–7.7)
NEUTROPHILS NFR BLD: 64.3 % (ref 38–73)
NRBC BLD-RTO: 0 /100 WBC
PCO2 BLDA: 34.9 MMHG (ref 35–45)
PH SMN: 7.44 [PH] (ref 7.35–7.45)
PHOSPHATE SERPL-MCNC: 5.4 MG/DL (ref 2.7–4.5)
PHOSPHATE SERPL-MCNC: 5.6 MG/DL (ref 2.7–4.5)
PHOSPHATE SERPL-MCNC: 5.8 MG/DL (ref 2.7–4.5)
PHOSPHATE SERPL-MCNC: 5.8 MG/DL (ref 2.7–4.5)
PLATELET # BLD AUTO: 328 K/UL (ref 150–450)
PMV BLD AUTO: 9.5 FL (ref 9.2–12.9)
PO2 BLDA: 113 MMHG (ref 80–100)
POC BE: -1 MMOL/L
POC IONIZED CALCIUM: 1.15 MMOL/L (ref 1.06–1.42)
POC SATURATED O2: 99 % (ref 95–100)
POC TCO2: 25 MMOL/L (ref 23–27)
POCT GLUCOSE: 130 MG/DL (ref 70–110)
POCT GLUCOSE: 133 MG/DL (ref 70–110)
POCT GLUCOSE: 142 MG/DL (ref 70–110)
POCT GLUCOSE: 142 MG/DL (ref 70–110)
POCT GLUCOSE: 147 MG/DL (ref 70–110)
POCT GLUCOSE: 170 MG/DL (ref 70–110)
POTASSIUM SERPL-SCNC: 4.5 MMOL/L (ref 3.5–5.1)
POTASSIUM SERPL-SCNC: 4.7 MMOL/L (ref 3.5–5.1)
PROT SERPL-MCNC: 7 G/DL (ref 6–8.4)
RBC # BLD AUTO: 2.72 M/UL (ref 4–5.4)
SAMPLE: ABNORMAL
SITE: ABNORMAL
SODIUM SERPL-SCNC: 135 MMOL/L (ref 136–145)
SODIUM SERPL-SCNC: 135 MMOL/L (ref 136–145)
SODIUM SERPL-SCNC: 136 MMOL/L (ref 136–145)
SODIUM SERPL-SCNC: 137 MMOL/L (ref 136–145)
WBC # BLD AUTO: 10.13 K/UL (ref 3.9–12.7)

## 2024-02-18 PROCEDURE — 63600175 PHARM REV CODE 636 W HCPCS: Mod: JZ,JG

## 2024-02-18 PROCEDURE — 94003 VENT MGMT INPAT SUBQ DAY: CPT

## 2024-02-18 PROCEDURE — 25000003 PHARM REV CODE 250

## 2024-02-18 PROCEDURE — 20000000 HC ICU ROOM

## 2024-02-18 PROCEDURE — 80053 COMPREHEN METABOLIC PANEL: CPT

## 2024-02-18 PROCEDURE — 82330 ASSAY OF CALCIUM: CPT | Performed by: STUDENT IN AN ORGANIZED HEALTH CARE EDUCATION/TRAINING PROGRAM

## 2024-02-18 PROCEDURE — 83735 ASSAY OF MAGNESIUM: CPT | Mod: 91 | Performed by: STUDENT IN AN ORGANIZED HEALTH CARE EDUCATION/TRAINING PROGRAM

## 2024-02-18 PROCEDURE — 83735 ASSAY OF MAGNESIUM: CPT | Performed by: STUDENT IN AN ORGANIZED HEALTH CARE EDUCATION/TRAINING PROGRAM

## 2024-02-18 PROCEDURE — 25000242 PHARM REV CODE 250 ALT 637 W/ HCPCS

## 2024-02-18 PROCEDURE — 82803 BLOOD GASES ANY COMBINATION: CPT

## 2024-02-18 PROCEDURE — 85025 COMPLETE CBC W/AUTO DIFF WBC: CPT

## 2024-02-18 PROCEDURE — 63600175 PHARM REV CODE 636 W HCPCS

## 2024-02-18 PROCEDURE — 94761 N-INVAS EAR/PLS OXIMETRY MLT: CPT | Mod: XB

## 2024-02-18 PROCEDURE — 80069 RENAL FUNCTION PANEL: CPT | Mod: 91 | Performed by: STUDENT IN AN ORGANIZED HEALTH CARE EDUCATION/TRAINING PROGRAM

## 2024-02-18 PROCEDURE — 99232 SBSQ HOSP IP/OBS MODERATE 35: CPT | Mod: ,,, | Performed by: NURSE PRACTITIONER

## 2024-02-18 PROCEDURE — 99900026 HC AIRWAY MAINTENANCE (STAT)

## 2024-02-18 PROCEDURE — 25000003 PHARM REV CODE 250: Performed by: STUDENT IN AN ORGANIZED HEALTH CARE EDUCATION/TRAINING PROGRAM

## 2024-02-18 PROCEDURE — 84100 ASSAY OF PHOSPHORUS: CPT | Performed by: STUDENT IN AN ORGANIZED HEALTH CARE EDUCATION/TRAINING PROGRAM

## 2024-02-18 PROCEDURE — 27100171 HC OXYGEN HIGH FLOW UP TO 24 HOURS

## 2024-02-18 PROCEDURE — 99900035 HC TECH TIME PER 15 MIN (STAT)

## 2024-02-18 PROCEDURE — 36600 WITHDRAWAL OF ARTERIAL BLOOD: CPT

## 2024-02-18 RX ADMIN — AMLODIPINE BESYLATE 10 MG: 10 TABLET ORAL at 09:02

## 2024-02-18 RX ADMIN — INSULIN ASPART 6 UNITS: 100 INJECTION, SOLUTION INTRAVENOUS; SUBCUTANEOUS at 04:02

## 2024-02-18 RX ADMIN — HEPARIN SODIUM 7500 UNITS: 5000 INJECTION INTRAVENOUS; SUBCUTANEOUS at 02:02

## 2024-02-18 RX ADMIN — INSULIN ASPART 6 UNITS: 100 INJECTION, SOLUTION INTRAVENOUS; SUBCUTANEOUS at 12:02

## 2024-02-18 RX ADMIN — CARVEDILOL 25 MG: 25 TABLET, FILM COATED ORAL at 09:02

## 2024-02-18 RX ADMIN — FUROSEMIDE 120 MG: 10 INJECTION, SOLUTION INTRAVENOUS at 08:02

## 2024-02-18 RX ADMIN — INSULIN ASPART 6 UNITS: 100 INJECTION, SOLUTION INTRAVENOUS; SUBCUTANEOUS at 07:02

## 2024-02-18 RX ADMIN — PSYLLIUM HUSK 1 PACKET: 3.4 POWDER ORAL at 08:02

## 2024-02-18 RX ADMIN — INSULIN ASPART 2 UNITS: 100 INJECTION, SOLUTION INTRAVENOUS; SUBCUTANEOUS at 04:02

## 2024-02-18 RX ADMIN — HEPARIN SODIUM 7500 UNITS: 5000 INJECTION INTRAVENOUS; SUBCUTANEOUS at 09:02

## 2024-02-18 RX ADMIN — INSULIN ASPART 6 UNITS: 100 INJECTION, SOLUTION INTRAVENOUS; SUBCUTANEOUS at 11:02

## 2024-02-18 RX ADMIN — SEVELAMER CARBONATE 1.6 G: 800 POWDER, FOR SUSPENSION ORAL at 07:02

## 2024-02-18 RX ADMIN — SEVELAMER CARBONATE 1.6 G: 800 POWDER, FOR SUSPENSION ORAL at 11:02

## 2024-02-18 RX ADMIN — FAMOTIDINE 20 MG: 20 TABLET, FILM COATED ORAL at 09:02

## 2024-02-18 RX ADMIN — FUROSEMIDE 120 MG: 10 INJECTION, SOLUTION INTRAVENOUS at 02:02

## 2024-02-18 RX ADMIN — SEVELAMER CARBONATE 1.6 G: 800 POWDER, FOR SUSPENSION ORAL at 04:02

## 2024-02-18 RX ADMIN — PSYLLIUM HUSK 1 PACKET: 3.4 POWDER ORAL at 09:02

## 2024-02-18 RX ADMIN — FUROSEMIDE 120 MG: 10 INJECTION, SOLUTION INTRAVENOUS at 09:02

## 2024-02-18 RX ADMIN — METRONIDAZOLE 500 MG: 5 INJECTION, SOLUTION INTRAVENOUS at 11:02

## 2024-02-18 RX ADMIN — HEPARIN SODIUM 7500 UNITS: 5000 INJECTION INTRAVENOUS; SUBCUTANEOUS at 06:02

## 2024-02-18 RX ADMIN — METRONIDAZOLE 500 MG: 5 INJECTION, SOLUTION INTRAVENOUS at 08:02

## 2024-02-18 RX ADMIN — METRONIDAZOLE 500 MG: 5 INJECTION, SOLUTION INTRAVENOUS at 03:02

## 2024-02-18 RX ADMIN — CEFTRIAXONE 2 G: 2 INJECTION, POWDER, FOR SOLUTION INTRAMUSCULAR; INTRAVENOUS at 09:02

## 2024-02-18 RX ADMIN — INSULIN DETEMIR 14 UNITS: 100 INJECTION, SOLUTION SUBCUTANEOUS at 09:02

## 2024-02-18 RX ADMIN — CARVEDILOL 25 MG: 25 TABLET, FILM COATED ORAL at 08:02

## 2024-02-18 RX ADMIN — OXYCODONE HYDROCHLORIDE 5 MG: 5 SOLUTION ORAL at 09:02

## 2024-02-18 RX ADMIN — INSULIN ASPART 6 UNITS: 100 INJECTION, SOLUTION INTRAVENOUS; SUBCUTANEOUS at 08:02

## 2024-02-18 NOTE — PROGRESS NOTES
[facility] - Surgical [facility]ensive Care  General Surgery  Progress Note    Subjective:     History of Present Illness:  53 year old morbidly obese female who does not routinely go to the doctor presents to the ED with malaise, nausea, vomiting, and groin, buttock, perineal swelling and tenderness. She began feeling ill a few days ago.     On arrival to the ED she is tachycardic to 120, hypertensive without fever. Labs demonstrate leukocytosis of 21, , with lactic acidosis and associated ALVARADO with cr 3.8, hyperglycemia with blood glucose of 824 accompanied by severe electrolyte derangements. CT imaging obtained demonstrates diffuse subcutaneous air along buttocks, perineum, anterior vulva, as well as bilateral thighs.     No prior abdominal surgeries. She denies problems with her heart or lungs. Non smoker. Does not routinely take any medications.    Post-Op Info:  Procedure(s) (LRB):  Lumbar Puncture (N/A)   2 Days Post-Op     Interval History: No issues overnight. No changes.    Medications:  Continuous Infusions:   dextrose 10 % in water (D10W)       Scheduled Meds:   sodium chloride 0.9%   Intravenous Once    amLODIPine  10 mg Per OG tube Daily    carvediloL  25 mg Per OG tube BID    cefTRIAXone (Rocephin) IV (PEDS and ADULTS)  2 g Intravenous Daily    famotidine  20 mg Per OG tube Daily    furosemide (LASIX) injection  120 mg Intravenous TID    heparin (porcine)  7,500 Units Subcutaneous Q8H    insulin aspart U-100  6 Units Subcutaneous Q4H    insulin detemir U-100  14 Units Subcutaneous Daily    metronidazole  500 mg Intravenous Q8H    psyllium husk (aspartame)  1 packet Per OG tube BID    sevelamer carbonate  1.6 g Per OG tube TID WM     PRN Meds:0.9%  NaCl infusion (for blood administration), sodium chloride 0.9%, acetaminophen, albuterol-ipratropium, dextrose 10 % in water (D10W), dextrose 10%, dextrose 10%, glucagon (human recombinant), glucose, glucose, hydrALAZINE, insulin aspart U-100, labetalol,  naloxone, ondansetron, oxyCODONE, prochlorperazine, sodium chloride 0.9%, sodium chloride 0.9%, sodium chloride 0.9%     Review of patient's allergies indicates:  No Known Allergies  Objective:     Vital Signs (Most Recent):  Temp: 99.7 °F (37.6 °C) (02/18/24 0700)  Pulse: 84 (02/18/24 0704)  Resp: 18 (02/18/24 0700)  BP: (!) 112/57 (02/18/24 0700)  SpO2: 100 % (02/18/24 0700) Vital Signs (24h Range):  Temp:  [98.9 °F (37.2 °C)-100.3 °F (37.9 °C)] 99.7 °F (37.6 °C)  Pulse:  [81-93] 84  Resp:  [16-31] 18  SpO2:  [100 %] 100 %  BP: (107-152)/(54-69) 112/57     Weight: (!) 157.9 kg (348 lb)  Body mass index is 57.91 kg/m².    Intake/Output - Last 3 Shifts         02/16 0700 02/17 0659 02/17 0700 02/18 0659 02/18 0700 02/19 0659    I.V. (mL/kg)       Blood 244      NG/ 695 30    IV Piggyback 896.4 395     Total Intake(mL/kg) 1600.4 (10.1) 1090 (6.9) 30 (0.2)    Urine (mL/kg/hr) 2100 (0.6) 1155 (0.3) 25 (0.1)    Other 300 250 50    Stool 0      Total Output 2400 1405 75    Net -799.6 -315 -45           Stool Occurrence 1 x               Physical Exam  Vitals and nursing note reviewed.   Constitutional:       General: She is not in acute distress.     Appearance: She is ill-appearing.   HENT:      Head: Normocephalic and atraumatic.   Eyes:      Extraocular Movements: Extraocular movements intact.      Conjunctiva/sclera: Conjunctivae normal.   Neck:      Comments: Left IJ Trialysis catheter  Cardiovascular:      Rate and Rhythm: Normal rate and regular rhythm.   Pulmonary:      Effort: Pulmonary effort is normal.      Comments: Intubated, on spontaneous    Abdominal:      General: There is no distension.      Palpations: Abdomen is soft.      Tenderness: There is no abdominal tenderness.   Genitourinary:     Comments: Glynn in place    Skin:     General: Skin is warm and dry.      Comments: Wound vac in place to R thigh/groin to suction   Neurological:      General: No focal deficit present.      Mental Status:  She is oriented to person, place, and time.   Psychiatric:         Mood and Affect: Mood normal.         Behavior: Behavior normal.          Significant Labs:  I have reviewed all pertinent lab results within the past 24 hours.  CBC:   Recent Labs   Lab 02/18/24  0338   WBC 10.13   RBC 2.72*   HGB 7.4*   HCT 23.9*      MCV 88   MCH 27.2   MCHC 31.0*     CMP:   Recent Labs   Lab 02/18/24  0338   *   CALCIUM 8.5*   ALBUMIN 1.8*   PROT 7.0      K 4.5   CO2 21*      BUN 44*   CREATININE 3.5*   ALKPHOS 125   ALT 10   AST 22   BILITOT 0.2       Significant Diagnostics:  I have reviewed all pertinent imaging results/findings within the past 24 hours.  Assessment/Plan:     * Ayaz's gangrene  53 y.o. 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     - Tentative plan for OR on Monday for wound vac change  - Last WV change 2/15  - Will discuss plans for trach/PEG/permacath/stoma in the setting of fevers. No fevers in 24 hours. Likely plan for next week pending family discussions  - LP 2/16 - Specimen for cultures is missing.   - Palliative following given overall poor prognosis, daughter would like everything done; MRI slightly improved but no changes to patient's neuro status  - Daily SBTs  - UA growing Burkholderia species. Sensitive to Bactrim  - Okay for DVT ppx   - Remainder of care per SICU        Marisa Fung MD  General Surgery  [facility]

## 2024-02-18 NOTE — PROGRESS NOTES
[facility] - [facility]ensive [facility]  Critical Care - Surgery  Progress Note    Patient Name: Sarah Saravia  MRN: [id number]  Admission Date: 1/29/2024  Hospital Length of Stay: 20 days  Code Status: Full Code  Attending Provider: Steve Cole MD  [facility]   Principal Problem: Ayaz's gangrene    Subjective:     Hospital/ICU Course:  No notes on file    Interval History/Significant Events: NAEON. AF, HDS. Doing well on spontaneous vent settings. Plan for wound vac change tomorrow.    Follow-up For: Procedure(s) (LRB):  Lumbar Puncture (N/A)    Post-Operative Day: 2 Days Post-Op    Objective:     Vital Signs (Most Recent):  Temp: 100 °F (37.8 °C) (02/18/24 0400)  Pulse: 86 (02/18/24 0630)  Resp: (!) 21 (02/18/24 0630)  BP: (!) 122/57 (02/18/24 0630)  SpO2: 100 % (02/18/24 0630) Vital Signs (24h Range):  Temp:  [98.9 °F (37.2 °C)-100.7 °F (38.2 °C)] 100 °F (37.8 °C)  Pulse:  [] 86  Resp:  [16-31] 21  SpO2:  [100 %] 100 %  BP: (107-154)/(54-69) 122/57     Weight: (!) 157.9 kg (348 lb)  Body mass index is 57.91 kg/m².      Intake/Output Summary (Last 24 hours) at 2/18/2024 0646  Last data filed at 2/18/2024 0615  Gross per 24 hour   Intake 1089.98 ml   Output 1405 ml   Net -315.02 ml          Physical Exam  Vitals and nursing note reviewed.   Constitutional:       General: She is not in acute distress.     Appearance: She is ill-appearing.   HENT:      Head: Normocephalic and atraumatic.   Eyes:      Extraocular Movements: Extraocular movements intact.      Conjunctiva/sclera: Conjunctivae normal.   Neck:      Comments: Left IJ Trialysis catheter  Cardiovascular:      Rate and Rhythm: Normal rate and regular rhythm.   Pulmonary:      Effort: Pulmonary effort is normal.      Comments: Intubated, on spontaneous    Abdominal:      General: There is no distension.      Palpations: Abdomen is soft.      Tenderness: There is no abdominal tenderness.    Genitourinary:     Comments: Glynn in place    Skin:     General: Skin is warm and dry.      Comments: Wound vac in place to R thigh/groin to suction   Neurological:      General: No focal deficit present.      Mental Status: She is oriented to person, place, and time.   Psychiatric:         Mood and Affect: Mood normal.         Behavior: Behavior normal.            Vents:  Vent Mode: Spont (02/18/24 0340)  Set Rate: 18 BPM (02/06/24 0349)  Vt Set: 420 mL (02/06/24 0349)  Pressure Support: 10 cmH20 (02/18/24 0340)  PEEP/CPAP: 5 cmH20 (02/18/24 0340)  Oxygen Concentration (%): 30 (02/18/24 0340)  Peak Airway Pressure: 16 cmH20 (02/18/24 0340)  Plateau Pressure: 15 cmH20 (02/18/24 0340)  Total Ve: 7.85 L/m (02/18/24 0340)  Negative Inspiratory Force (cm H2O): 0 (02/18/24 0340)  F/VT Ratio<105 (RSBI): (!) 59.59 (02/18/24 0340)    Lines/Drains/Airways       Central Venous Catheter Line  Duration             Trialysis (Dialysis) Catheter 02/11/24 2303 left internal jugular 6 days              Drain  Duration                  NG/OG Tube 02/06/24 1030 Center mouth 11 days         Urethral Catheter 02/16/24 1215 1 day              Airway  Duration                  Airway - Non-Surgical 01/31/24 1020 17 days              Peripheral Intravenous Line  Duration                  Peripheral IV - Single Lumen 02/13/24 1132 18 G;1 3/4 in Right Forearm 4 days                    Significant Labs:    CBC/Anemia Profile:  Recent Labs   Lab 02/16/24  0907 02/17/24  0311 02/18/24  0338   WBC 8.15 12.23 10.13   HGB 7.5* 7.9* 7.4*   HCT 24.1* 24.9* 23.9*    332 328   MCV 88 86 88   RDW 17.2* 17.3* 17.3*        Chemistries:  Recent Labs   Lab 02/17/24  0311 02/17/24  1351 02/17/24  2221 02/18/24  0338    137 137 136   K 4.7 4.6 4.4 4.5    104 104 105   CO2 21* 22* 22* 21*   BUN 36* 37* 41* 44*   CREATININE 2.9* 3.5* 3.4* 3.5*   CALCIUM 8.5* 8.3* 8.5* 8.5*   ALBUMIN 1.8* 1.8* 1.8* 1.8*   PROT 7.0  --   --  7.0   BILITOT  0.2  --   --  0.2   ALKPHOS 125  --   --  125   ALT 10  --   --  10   AST 28  --   --  22   MG 1.9 1.9  --  1.9   PHOS 5.4* 5.1* 5.3* 5.4*       Significant Imaging:  I have reviewed all pertinent imaging results/findings within the past 24 hours.  Assessment/Plan:     Renal/  * Ayaz's gangrene    Neuro/Psych:   #Acute Encephalopathy  CTH 2/3 with scattered hypoattenuation likely representing age indeterminate infarcts. EEG findings consistent with moderate-severe encephalopathy. MRI 2/6: Several scattered punctate acute infarcts throughout the bilateral frontal lobes, centrum semiovale, basal ganglia, corpus callosum, and cerebellar hemispheres.  CTA 2/6: Atherosclerotic plaquing of the carotid bifurcations and proximal ICAs with less than 50% proximal ICA stenosis by NASCET criteria . Repeat CTH and MRI/MRA unchanged from prior exams. S/p multiple wound vac exchanges. Palliative onboard with family wanting full measures.    Neuro/Psych  Repeat CTH and MRI/MRA stable from initial reads. LP 2/16. Elevated WBCs and protein consistent with bacterial meningitis.  -- Sedation: None  -- Pain: kermit tylenol, dialudid and oxy prn  -- GCS 5T: E2, V1T, M2; exam stable  -- Neurology following; appreciate recs  -- Neurovascular following previously; signed off 2/17  -- Palliative following; GOC conversation 2/7; appreciate recs  -- CSF from LP misplaced. Fluoro, IR resident performing LP, and lab contacted. Fluro staff with be back 2/19 and will fu on handling of CSF so cultures can be obtained             Cards:   -- HDS  -- goal SBP < 180, MAP >65  -- normotensive; hydralazine and labetalol prns  -- Continue amlodipine 10mg daily; coreg 25mg BID      Pulm:   #Acute Respiratory Failure  -- Intubated on spontaneous , Pressure support.  -- Goal O2 sat > 90%  -- Trach/PEG next week       Renal:  #ALVARADO on CKD  #ATN  Received HD 2/9. 2/13 [facility] trialysis  -- Nephrology following; appreciate recs  -- No urgent need for RRT at this  time  -- 120mg IV lasix tid per nephro  -- UOP: 1.1L in 24hrs (0.3 ml/kg/hr)    Recent Labs   Lab 02/17/24  1351 02/17/24  2221 02/18/24  0338   BUN 37* 41* 44*   CREATININE 3.5* 3.4* 3.5*        FEN / GI:   -- Daily CMPs  -- NGT in place   -- Replace lytes as needed  -- Nutrition: NPO, TF (Novasource Renal) at goal 30 cc/hr.  Hold at midnight for OR 2/19  -- GI PPX   -- Nutrition following; appreciate recs  -- Continue psyllium and sevelamer      ID:    #Ayaz's gangrene  s/p wound vac exchange/ debridement x4 for nec fascitis. R groin tissue with proteus, sensitive to ceftriaxone. Growing bacteroids as well.   -- Abx: ceftriaxone and flagyl EOT 2/22  -- ID following ; appreciate recs  -- Urine cx 2/14 showing burkholderia only sensitive to bactrim likely contaminant; fu repeat urine culture speciation/sensitivities, showing GNRs  -- Wound vac exchange Monday 2/19       Heme/Onc:  #Anemia  -- Daily CBC  -- Heparin DVT ppx  -- Transfuse if Hgb <7     Endo:   #IDDM  Initially presented to OSH with DKA with latest A1C 10.4 AG Gap resolved  Placed on insulin gtt 2/3 and dced 2/12.    - q4h BG monitoring while NPO  - Detemir 14 units daily  - Novolog 6units q4h while on TFs  - Moderate dose SSI PRN  - Endocrine following, appreciate recs      PPx:   Feeding: NPO, Tube Feeds  Analgesia/Sedation: See above  Thromboembolic prevention: SQH   HOB >30: Y  Stress Ulcer ppx: Famotidine  Glucose control: Goal 140-180 g/dl, kermit detemir and novolog, SSI, Endo following  Bowel reg: psyllium  Invasive Lines/Drains/Airway: Glynn, ETT, LIJ Trialysis, PIV x2      Dispo/Code Status/Palliative:   -- SICU / Full Code   -- Pending LP cultures  -- Wound vac exchange Monday         Wilian Soria MD  Critical Care - Surgery  [facility] - Surgical Intensive Care

## 2024-02-18 NOTE — PLAN OF CARE
Problem: Adult Inpatient Plan of Care  Goal: Plan of Care Review  Outcome: Ongoing, Progressing  Goal: Patient-Specific Goal (Individualized)  Outcome: Ongoing, Progressing  Goal: Absence of Hospital-Acquired Illness or Injury  Outcome: Ongoing, Progressing  Goal: Optimal Comfort and Wellbeing  Outcome: Ongoing, Progressing  Goal: Readiness for Transition of Care  Outcome: Ongoing, Progressing     Problem: Bariatric Environmental Safety  Goal: Safety Maintained with Care  Outcome: Ongoing, Progressing     Problem: Diabetes Comorbidity  Goal: Blood Glucose Level Within Targeted Range  Outcome: Ongoing, Progressing     Problem: Adjustment to Illness (Sepsis/Septic Shock)  Goal: Optimal Coping  Outcome: Ongoing, Progressing     Problem: Bleeding (Sepsis/Septic Shock)  Goal: Absence of Bleeding  Outcome: Ongoing, Progressing     Problem: Glycemic Control Impaired (Sepsis/Septic Shock)  Goal: Blood Glucose Level Within Desired Range  Outcome: Ongoing, Progressing     Problem: Infection Progression (Sepsis/Septic Shock)  Goal: Absence of Infection Signs and Symptoms  Outcome: Ongoing, Progressing     Problem: Nutrition Impaired (Sepsis/Septic Shock)  Goal: Optimal Nutrition Intake  Outcome: Ongoing, Progressing     Problem: Diabetic Ketoacidosis  Goal: Fluid and Electrolyte Balance with Absence of Ketosis  Outcome: Ongoing, Progressing     Problem: Fluid and Electrolyte Imbalance (Acute Kidney Injury/Impairment)  Goal: Fluid and Electrolyte Balance  Outcome: Ongoing, Progressing     Problem: Oral Intake Inadequate (Acute Kidney Injury/Impairment)  Goal: Optimal Nutrition Intake  Outcome: Ongoing, Progressing     Problem: Renal Function Impairment (Acute Kidney Injury/Impairment)  Goal: Effective Renal Function  Outcome: Ongoing, Progressing     Problem: Infection  Goal: Absence of Infection Signs and Symptoms  Outcome: Ongoing, Progressing     Problem: Device-Related Complication Risk (Hemodialysis)  Goal: Safe,  Effective Therapy Delivery  Outcome: Ongoing, Progressing     Problem: Hemodynamic Instability (Hemodialysis)  Goal: Effective Tissue Perfusion  Outcome: Ongoing, Progressing     Problem: Infection (Hemodialysis)  Goal: Absence of Infection Signs and Symptoms  Outcome: Ongoing, Progressing     Problem: Fall Injury Risk  Goal: Absence of Fall and Fall-Related Injury  Outcome: Ongoing, Progressing     Problem: Restraint, Nonbehavioral (Nonviolent)  Goal: Absence of Harm or Injury  Outcome: Ongoing, Progressing     Problem: Device-Related Complication Risk (CRRT (Continuous Renal Replacement Therapy))  Goal: Safe, Effective Therapy Delivery  Outcome: Ongoing, Progressing     Problem: Hypothermia (CRRT (Continuous Renal Replacement Therapy))  Goal: Body Temperature Maintained in Desired Range  Outcome: Ongoing, Progressing     Problem: Infection (CRRT (Continuous Renal Replacement Therapy))  Goal: Absence of Infection Signs and Symptoms  Outcome: Ongoing, Progressing     Problem: Communication Impairment (Mechanical Ventilation, Invasive)  Goal: Effective Communication  Outcome: Ongoing, Progressing     Problem: Device-Related Complication Risk (Mechanical Ventilation, Invasive)  Goal: Optimal Device Function  Outcome: Ongoing, Progressing     Problem: Inability to Wean (Mechanical Ventilation, Invasive)  Goal: Mechanical Ventilation Liberation  Outcome: Ongoing, Progressing     Problem: Nutrition Impairment (Mechanical Ventilation, Invasive)  Goal: Optimal Nutrition Delivery  Outcome: Ongoing, Progressing     Problem: Skin and Tissue Injury (Mechanical Ventilation, Invasive)  Goal: Absence of Device-Related Skin and Tissue Injury  Outcome: Ongoing, Progressing     Problem: Ventilator-Induced Lung Injury (Mechanical Ventilation, Invasive)  Goal: Absence of Ventilator-Induced Lung Injury  Outcome: Ongoing, Progressing     Problem: Coping Ineffective  Goal: Effective Coping  Outcome: Ongoing, Progressing

## 2024-02-18 NOTE — CLINICAL REVIEW
Nephrology Chart Review      Intake/Output Summary (Last 24 hours) at 2/18/2024 1129  Last data filed at 2/18/2024 1100  Gross per 24 hour   Intake 1064.98 ml   Output 1210 ml   Net -145.02 ml       Vitals:    02/18/24 0930 02/18/24 1000 02/18/24 1030 02/18/24 1100   BP: 136/63 (!) 119/58 (!) 118/58 (!) 115/56   BP Location:  Left forearm  Left forearm   Patient Position:  Lying  Lying   Pulse: 90 87 83 83   Resp: (!) 26 (!) 21 17 19   Temp:       TempSrc:    Oral   SpO2: 100% 100% 100% 100%   Weight:       Height:           Recent Labs   Lab 02/17/24  1351 02/17/24  2221 02/18/24  0338    137 136   K 4.6 4.4 4.5    104 105   CO2 22* 22* 21*   BUN 37* 41* 44*   CREATININE 3.5* 3.4* 3.5*   CALCIUM 8.3* 8.5* 8.5*   PHOS 5.1* 5.3* 5.4*       Labs stable, UOP stable, continue LASIX, no indication for acute dialysis at this time.    No other related issues identified. Please call Nephrology as needed; We will continue to follow.

## 2024-02-18 NOTE — ASSESSMENT & PLAN NOTE
Neuro/Psych:   #Acute Encephalopathy  CTH 2/3 with scattered hypoattenuation likely representing age indeterminate infarcts. EEG findings consistent with moderate-severe encephalopathy. MRI 2/6: Several scattered punctate acute infarcts throughout the bilateral frontal lobes, centrum semiovale, basal ganglia, corpus callosum, and cerebellar hemispheres.  CTA 2/6: Atherosclerotic plaquing of the carotid bifurcations and proximal ICAs with less than 50% proximal ICA stenosis by NASCET criteria . Repeat CTH and MRI/MRA unchanged from prior exams. S/p multiple wound vac exchanges. Palliative onboard with family wanting full measures.    Neuro/Psych  Repeat CTH and MRI/MRA stable from initial reads. LP 2/16. Elevated WBCs and protein consistent with bacterial meningitis.  -- Sedation: None  -- Pain: kermit tylenol, dialudid and oxy prn  -- GCS 5T: E2, V1T, M2; exam stable  -- Neurology following; appreciate recs  -- Neurovascular following previously; signed off 2/17  -- Palliative following; GOC conversation 2/7; appreciate recs  -- CSF from LP misplaced. Fluoro, IR resident performing LP, and lab contacted. Fluro staff with be back 2/19 and will fu on handling of CSF so cultures can be obtained             Cards:   -- HDS  -- goal SBP < 180, MAP >65  -- normotensive; hydralazine and labetalol prns  -- Continue amlodipine 10mg daily; coreg 25mg BID      Pulm:   #Acute Respiratory Failure  -- Intubated on spontaneous , Pressure support.  -- Goal O2 sat > 90%  -- Trach/PEG next week       Renal:  #ALVARADO on CKD  #ATN  Received HD 2/9. 2/13 [facility] trialysis  -- Nephrology following; appreciate recs  -- No urgent need for RRT at this time  -- 120mg IV lasix tid per nephro  -- UOP: 1.1L in 24hrs (0.3 ml/kg/hr)    Recent Labs   Lab 02/17/24  1351 02/17/24  2221 02/18/24  0338   BUN 37* 41* 44*   CREATININE 3.5* 3.4* 3.5*        FEN / GI:   -- Daily CMPs  -- NGT in place   -- Replace lytes as needed  -- Nutrition: NPO, TF (Novasource  Renal) at goal 30 cc/hr.  Hold at midnight for OR 2/19  -- GI PPX   -- Nutrition following; appreciate recs  -- Continue psyllium and sevelamer      ID:    #Ayaz's gangrene  s/p wound vac exchange/ debridement x4 for nec fascitis. R groin tissue with proteus, sensitive to ceftriaxone. Growing bacteroids as well.   -- Abx: ceftriaxone and flagyl EOT 2/22  -- ID following ; appreciate recs  -- Urine cx 2/14 showing burkholderia only sensitive to bactrim likely contaminant; fu repeat urine culture speciation/sensitivities, showing GNRs  -- Wound vac exchange Monday 2/19       Heme/Onc:  #Anemia  -- Daily CBC  -- Heparin DVT ppx  -- Transfuse if Hgb <7     Endo:   #IDDM  Initially presented to OSH with DKA with latest A1C 10.4 AG Gap resolved  Placed on insulin gtt 2/3 and dced 2/12.    - q4h BG monitoring while NPO  - Detemir 14 units daily  - Novolog 6units q4h while on TFs  - Moderate dose SSI PRN  - Endocrine following, appreciate recs      PPx:   Feeding: NPO, Tube Feeds  Analgesia/Sedation: See above  Thromboembolic prevention: SQH   HOB >30: Y  Stress Ulcer ppx: Famotidine  Glucose control: Goal 140-180 g/dl, kermit detemir and novolog, SSI, Endo following  Bowel reg: psyllium  Invasive Lines/Drains/Airway: Glynn, ETT, LIJ Trialysis, PIV x2      Dispo/Code Status/Palliative:   -- SICU / Full Code   -- Pending LP cultures  -- Wound vac exchange Monday

## 2024-02-18 NOTE — ASSESSMENT & PLAN NOTE
53 y.o. 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     - Tentative plan for OR on Monday for wound vac change  - Last [place] change 2/15  - Will discuss plans for trach/PEG/permacath/stoma in the setting of fevers. No fevers in 24 hours. Likely plan for next week pending family discussions  - LP 2/16 - Specimen for cultures is missing.   - Palliative following given overall poor prognosis, daughter would like everything done; MRI slightly improved but no changes to patient's neuro status  - Daily SBTs  - UA growing Burkholderia species. Sensitive to Bactrim  - Okay for DVT ppx   - Remainder of care per SICU

## 2024-02-18 NOTE — PROGRESS NOTES
"[facility]  Endocrinology  Progress Note    Admit Date: 1/29/2024     Reason for Consult: Management of T2DM, Hyperglycemia     Surgical Procedure and Date: n/a    Diabetes diagnosis year: new onset     Home Diabetes Medications:  none per chart review and family present at bedside     Lab Results   Component Value Date    HGBA1C 10.4 (H) 01/30/2024       Diabetes Complications include:     Hyperglycemia, DKA at OSH     Complicating diabetes co morbidities:   Obesity       HPI:   Patient is a 53 y.o. female with a diagnosis of obesity (BMI 53) admitted with N/V and R groin wound found to be in DKA at OSH. 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 While at OSH pt developed acute respiratory failure requiring intubation. Pulmonology was consulted for ventilator management and critical illness. Nephrology was consulted for worsening vishal in the setting of lactic acidosis and septic shock likely ATN, sCr elevated at 3.8 on admit now 4.0 post HD. Pt being admitted to SICU for surgical critical care management. Immediate plans include hemodynamic stabilization, weaning of respiratory support, and RRT.  Endocrinology consulted for management of T2DM.                 Interval HPI:   Overnight events: Remains in SICU intubated. BG well controlled on current SQ insulin regimen. Plan for OR Monday for wound vac exchange. Creatinine 3.5. Diet NPO    Eating:   NPO  Nausea: No  Hypoglycemia and intervention: No  Fever: No  TPN and/or TF: Yes  If yes, type of TF/TPN and rate: TFs at 30 ml/hr     BP (!) 137/59   Pulse 93   Temp 99.7 °F (37.6 °C) (Oral)   Resp (!) 31   Ht 5' 5" (1.651 m)   Wt (!) 157.9 kg (348 lb)   LMP 01/01/2024 (Approximate) Comment: tubal ligation  SpO2 100%   BMI 57.91 kg/m²     Labs Reviewed and Include    Recent Labs   Lab 02/18/24  0338   *   CALCIUM 8.5*   ALBUMIN 1.8* " "  PROT 7.0      K 4.5   CO2 21*      BUN 44*   CREATININE 3.5*   ALKPHOS 125   ALT 10   AST 22   BILITOT 0.2     Lab Results   Component Value Date    WBC 10.13 02/18/2024    HGB 7.4 (L) 02/18/2024    HCT 23.9 (L) 02/18/2024    MCV 88 02/18/2024     02/18/2024     No results for input(s): "TSH", "FREET4" in the last 168 hours.  Lab Results   Component Value Date    HGBA1C 10.4 (H) 01/30/2024       Nutritional status:   Body mass index is 57.91 kg/m².  Lab Results   Component Value Date    ALBUMIN 1.8 (L) 02/18/2024    ALBUMIN 1.8 (L) 02/17/2024    ALBUMIN 1.8 (L) 02/17/2024     No results found for: "PREALBUMIN"    Estimated Creatinine Clearance: 28.6 mL/min (A) (based on SCr of 3.5 mg/dL (H)).    Accu-Checks  Recent Labs     02/16/24 2010 02/16/24  2359 02/17/24  0406 02/17/24  0752 02/17/24  1129 02/17/24  1601 02/17/24  2013 02/18/24  0024 02/18/24  0355 02/18/24  0733   POCTGLUCOSE 124* 115* 132* 137* 140* 129* 139* 133* 142* 130*       Current Medications and/or Treatments Impacting Glycemic Control  Immunotherapy:    Immunosuppressants       None          Steroids:   Hormones (From admission, onward)      None          Pressors:    Autonomic Drugs (From admission, onward)      None          Hyperglycemia/Diabetes Medications:   Antihyperglycemics (From admission, onward)      Start     Stop Route Frequency Ordered    02/13/24 0915  insulin detemir U-100 (Levemir) pen 14 Units         -- SubQ Daily 02/13/24 0906    02/09/24 1200  insulin aspart U-100 pen 6 Units         -- SubQ Every 4 hours 02/09/24 0901    02/03/24 1010  insulin aspart U-100 pen 0-10 Units         -- SubQ Every 4 hours PRN 02/03/24 0911            ASSESSMENT and PLAN    Renal/  * Ayaz's gangrene  Managed per primary team  Optimize BG control        ALVARADO (acute kidney injury)  Lab Results   Component Value Date    CREATININE 3.5 (H) 02/18/2024     Avoid insulin stacking  Titrate insulin slowly       Endocrine  Morbid " (severe) obesity due to excess calories  Body mass index is 57.91 kg/m².  May increase insulin resistance.         New onset type 2 diabetes mellitus  BG goal 140-180  No previous hx of T2DM per family at bedside. A1c of 10.4. DKA at OSH. TF off. BG stable on regimen.      Plan:  - Continue Levemir 14 units daily.  - Continue Novolog 6 units q 4 hrs while on tube feeding (HOLD if tube feeding is stopped or BG < 100)   - Continue Moderate Dose Correction Scale  - BG monitoring q 4 hrs while NPO /TF    ** Please call Endocrine for any BG related issues **      Discharge plans: TBD    Lab Results   Component Value Date    HGBA1C 10.4 (H) 01/30/2024             Zoie Muñiz, NP  Endocrinology  [facility]

## 2024-02-18 NOTE — SUBJECTIVE & OBJECTIVE
Interval History: No issues overnight. No changes.    Medications:  Continuous Infusions:   dextrose 10 % in water (D10W)       Scheduled Meds:   sodium chloride 0.9%   Intravenous Once    amLODIPine  10 mg Per OG tube Daily    carvediloL  25 mg Per OG tube BID    cefTRIAXone (Rocephin) IV (PEDS and ADULTS)  2 g Intravenous Daily    famotidine  20 mg Per OG tube Daily    furosemide (LASIX) injection  120 mg Intravenous TID    heparin (porcine)  7,500 Units Subcutaneous Q8H    insulin aspart U-100  6 Units Subcutaneous Q4H    insulin detemir U-100  14 Units Subcutaneous Daily    metronidazole  500 mg Intravenous Q8H    psyllium husk (aspartame)  1 packet Per OG tube BID    sevelamer carbonate  1.6 g Per OG tube TID WM     PRN Meds:0.9%  NaCl infusion (for blood administration), sodium chloride 0.9%, acetaminophen, albuterol-ipratropium, dextrose 10 % in water (D10W), dextrose 10%, dextrose 10%, glucagon (human recombinant), glucose, glucose, hydrALAZINE, insulin aspart U-100, labetalol, naloxone, ondansetron, oxyCODONE, prochlorperazine, sodium chloride 0.9%, sodium chloride 0.9%, sodium chloride 0.9%     Review of patient's allergies indicates:  No Known Allergies  Objective:     Vital Signs (Most Recent):  Temp: 99.7 °F (37.6 °C) (02/18/24 0700)  Pulse: 84 (02/18/24 0704)  Resp: 18 (02/18/24 0700)  BP: (!) 112/57 (02/18/24 0700)  SpO2: 100 % (02/18/24 0700) Vital Signs (24h Range):  Temp:  [98.9 °F (37.2 °C)-100.3 °F (37.9 °C)] 99.7 °F (37.6 °C)  Pulse:  [81-93] 84  Resp:  [16-31] 18  SpO2:  [100 %] 100 %  BP: (107-152)/(54-69) 112/57     Weight: (!) 157.9 kg (348 lb)  Body mass index is 57.91 kg/m².    Intake/Output - Last 3 Shifts         02/16 0700  02/17 0659 02/17 0700 02/18 0659 02/18 0700  02/19 0659    I.V. (mL/kg)       Blood 244      NG/ 695 30    IV Piggyback 896.4 395     Total Intake(mL/kg) 1600.4 (10.1) 1090 (6.9) 30 (0.2)    Urine (mL/kg/hr) 2100 (0.6) 1155 (0.3) 25 (0.1)    Other 300 250 50     Stool 0      Total Output 2400 1405 75    Net -799.6 -315 -45           Stool Occurrence 1 x               Physical Exam  Vitals and nursing note reviewed.   Constitutional:       General: She is not in acute distress.     Appearance: She is ill-appearing.   HENT:      Head: Normocephalic and atraumatic.   Eyes:      Extraocular Movements: Extraocular movements intact.      Conjunctiva/sclera: Conjunctivae normal.   Neck:      Comments: Left IJ Trialysis catheter  Cardiovascular:      Rate and Rhythm: Normal rate and regular rhythm.   Pulmonary:      Effort: Pulmonary effort is normal.      Comments: Intubated, on spontaneous    Abdominal:      General: There is no distension.      Palpations: Abdomen is soft.      Tenderness: There is no abdominal tenderness.   Genitourinary:     Comments: Glynn in place    Skin:     General: Skin is warm and dry.      Comments: Wound vac in place to R thigh/groin to suction   Neurological:      General: No focal deficit present.      Mental Status: She is oriented to person, place, and time.   Psychiatric:         Mood and Affect: Mood normal.         Behavior: Behavior normal.          Significant Labs:  I have reviewed all pertinent lab results within the past 24 hours.  CBC:   Recent Labs   Lab 02/18/24  0338   WBC 10.13   RBC 2.72*   HGB 7.4*   HCT 23.9*      MCV 88   MCH 27.2   MCHC 31.0*     CMP:   Recent Labs   Lab 02/18/24  0338   *   CALCIUM 8.5*   ALBUMIN 1.8*   PROT 7.0      K 4.5   CO2 21*      BUN 44*   CREATININE 3.5*   ALKPHOS 125   ALT 10   AST 22   BILITOT 0.2       Significant Diagnostics:  I have reviewed all pertinent imaging results/findings within the past 24 hours.

## 2024-02-18 NOTE — SUBJECTIVE & OBJECTIVE
"Interval HPI:   Overnight events: Remains in SICU intubated. BG well controlled on current SQ insulin regimen. Plan for OR Monday for wound vac exchange. Creatinine 3.5. Diet NPO    Eating:   NPO  Nausea: No  Hypoglycemia and intervention: No  Fever: No  TPN and/or TF: Yes  If yes, type of TF/TPN and rate: TFs at 30 ml/hr     BP (!) 137/59   Pulse 93   Temp 99.7 °F (37.6 °C) (Oral)   Resp (!) 31   Ht 5' 5" (1.651 m)   Wt (!) 157.9 kg (348 lb)   LMP 01/01/2024 (Approximate) Comment: tubal ligation  SpO2 100%   BMI 57.91 kg/m²     Labs Reviewed and Include    Recent Labs   Lab 02/18/24  0338   *   CALCIUM 8.5*   ALBUMIN 1.8*   PROT 7.0      K 4.5   CO2 21*      BUN 44*   CREATININE 3.5*   ALKPHOS 125   ALT 10   AST 22   BILITOT 0.2     Lab Results   Component Value Date    WBC 10.13 02/18/2024    HGB 7.4 (L) 02/18/2024    HCT 23.9 (L) 02/18/2024    MCV 88 02/18/2024     02/18/2024     No results for input(s): "TSH", "FREET4" in the last 168 hours.  Lab Results   Component Value Date    HGBA1C 10.4 (H) 01/30/2024       Nutritional status:   Body mass index is 57.91 kg/m².  Lab Results   Component Value Date    ALBUMIN 1.8 (L) 02/18/2024    ALBUMIN 1.8 (L) 02/17/2024    ALBUMIN 1.8 (L) 02/17/2024     No results found for: "PREALBUMIN"    Estimated Creatinine Clearance: 28.6 mL/min (A) (based on SCr of 3.5 mg/dL (H)).    Accu-Checks  Recent Labs     02/16/24 2010 02/16/24  2359 02/17/24  0406 02/17/24  0752 02/17/24  1129 02/17/24  1601 02/17/24  2013 02/18/24  0024 02/18/24  0355 02/18/24  0733   POCTGLUCOSE 124* 115* 132* 137* 140* 129* 139* 133* 142* 130*       Current Medications and/or Treatments Impacting Glycemic Control  Immunotherapy:    Immunosuppressants       None          Steroids:   Hormones (From admission, onward)      None          Pressors:    Autonomic Drugs (From admission, onward)      None          Hyperglycemia/Diabetes Medications:   Antihyperglycemics (From " admission, onward)      Start     Stop Route Frequency Ordered    02/13/24 0915  insulin detemir U-100 (Levemir) pen 14 Units         -- SubQ Daily 02/13/24 0906    02/09/24 1200  insulin aspart U-100 pen 6 Units         -- SubQ Every 4 hours 02/09/24 0901    02/03/24 1010  insulin aspart U-100 pen 0-10 Units         -- SubQ Every 4 hours PRN 02/03/24 0911

## 2024-02-18 NOTE — ASSESSMENT & PLAN NOTE
Lab Results   Component Value Date    CREATININE 3.5 (H) 02/18/2024     Avoid insulin stacking  Titrate insulin slowly

## 2024-02-18 NOTE — SUBJECTIVE & OBJECTIVE
Interval History/Significant Events: NAEON. AF, HDS. Doing well on spontaneous vent settings. Plan for wound vac change tomorrow.    Follow-up For: Procedure(s) (LRB):  Lumbar Puncture (N/A)    Post-Operative Day: 2 Days Post-Op    Objective:     Vital Signs (Most Recent):  Temp: 100 °F (37.8 °C) (02/18/24 0400)  Pulse: 86 (02/18/24 0630)  Resp: (!) 21 (02/18/24 0630)  BP: (!) 122/57 (02/18/24 0630)  SpO2: 100 % (02/18/24 0630) Vital Signs (24h Range):  Temp:  [98.9 °F (37.2 °C)-100.7 °F (38.2 °C)] 100 °F (37.8 °C)  Pulse:  [] 86  Resp:  [16-31] 21  SpO2:  [100 %] 100 %  BP: (107-154)/(54-69) 122/57     Weight: (!) 157.9 kg (348 lb)  Body mass index is 57.91 kg/m².      Intake/Output Summary (Last 24 hours) at 2/18/2024 0646  Last data filed at 2/18/2024 0615  Gross per 24 hour   Intake 1089.98 ml   Output 1405 ml   Net -315.02 ml          Physical Exam  Vitals and nursing note reviewed.   Constitutional:       General: She is not in acute distress.     Appearance: She is ill-appearing.   HENT:      Head: Normocephalic and atraumatic.   Eyes:      Extraocular Movements: Extraocular movements intact.      Conjunctiva/sclera: Conjunctivae normal.   Neck:      Comments: Left IJ Trialysis catheter  Cardiovascular:      Rate and Rhythm: Normal rate and regular rhythm.   Pulmonary:      Effort: Pulmonary effort is normal.      Comments: Intubated, on spontaneous    Abdominal:      General: There is no distension.      Palpations: Abdomen is soft.      Tenderness: There is no abdominal tenderness.   Genitourinary:     Comments: Glynn in place    Skin:     General: Skin is warm and dry.      Comments: Wound vac in place to R thigh/groin to suction   Neurological:      General: No focal deficit present.      Mental Status: She is oriented to person, place, and time.   Psychiatric:         Mood and Affect: Mood normal.         Behavior: Behavior normal.            Vents:  Vent Mode: Spont (02/18/24 0340)  Set Rate: 18  BPM (02/06/24 0349)  Vt Set: 420 mL (02/06/24 0349)  Pressure Support: 10 cmH20 (02/18/24 0340)  PEEP/CPAP: 5 cmH20 (02/18/24 0340)  Oxygen Concentration (%): 30 (02/18/24 0340)  Peak Airway Pressure: 16 cmH20 (02/18/24 0340)  Plateau Pressure: 15 cmH20 (02/18/24 0340)  Total Ve: 7.85 L/m (02/18/24 0340)  Negative Inspiratory Force (cm H2O): 0 (02/18/24 0340)  F/VT Ratio<105 (RSBI): (!) 59.59 (02/18/24 0340)    Lines/Drains/Airways       Central Venous Catheter Line  Duration             Trialysis (Dialysis) Catheter 02/11/24 2303 left internal jugular 6 days              Drain  Duration                  NG/OG Tube 02/06/24 1030 Center mouth 11 days         Urethral Catheter 02/16/24 1215 1 day              Airway  Duration                  Airway - Non-Surgical 01/31/24 1020 17 days              Peripheral Intravenous Line  Duration                  Peripheral IV - Single Lumen 02/13/24 1132 18 G;1 3/4 in Right Forearm 4 days                    Significant Labs:    CBC/Anemia Profile:  Recent Labs   Lab 02/16/24  0907 02/17/24  0311 02/18/24  0338   WBC 8.15 12.23 10.13   HGB 7.5* 7.9* 7.4*   HCT 24.1* 24.9* 23.9*    332 328   MCV 88 86 88   RDW 17.2* 17.3* 17.3*        Chemistries:  Recent Labs   Lab 02/17/24  0311 02/17/24  1351 02/17/24  2221 02/18/24  0338    137 137 136   K 4.7 4.6 4.4 4.5    104 104 105   CO2 21* 22* 22* 21*   BUN 36* 37* 41* 44*   CREATININE 2.9* 3.5* 3.4* 3.5*   CALCIUM 8.5* 8.3* 8.5* 8.5*   ALBUMIN 1.8* 1.8* 1.8* 1.8*   PROT 7.0  --   --  7.0   BILITOT 0.2  --   --  0.2   ALKPHOS 125  --   --  125   ALT 10  --   --  10   AST 28  --   --  22   MG 1.9 1.9  --  1.9   PHOS 5.4* 5.1* 5.3* 5.4*       Significant Imaging:  I have reviewed all pertinent imaging results/findings within the past 24 hours.

## 2024-02-19 PROBLEM — N30.00 ACUTE CYSTITIS WITHOUT HEMATURIA: Status: ACTIVE | Noted: 2024-02-19

## 2024-02-19 LAB
ALBUMIN SERPL BCP-MCNC: 1.9 G/DL (ref 3.5–5.2)
ALBUMIN SERPL BCP-MCNC: 1.9 G/DL (ref 3.5–5.2)
ALBUMIN SERPL BCP-MCNC: 2 G/DL (ref 3.5–5.2)
ALLENS TEST: NORMAL
ALP SERPL-CCNC: 123 U/L (ref 55–135)
ALT SERPL W/O P-5'-P-CCNC: 11 U/L (ref 10–44)
ANION GAP SERPL CALC-SCNC: 10 MMOL/L (ref 8–16)
ANION GAP SERPL CALC-SCNC: 9 MMOL/L (ref 8–16)
ANION GAP SERPL CALC-SCNC: 9 MMOL/L (ref 8–16)
AST SERPL-CCNC: 30 U/L (ref 10–40)
BASOPHILS # BLD AUTO: 0.08 K/UL (ref 0–0.2)
BASOPHILS NFR BLD: 0.9 % (ref 0–1.9)
BILIRUB SERPL-MCNC: 0.2 MG/DL (ref 0.1–1)
BUN SERPL-MCNC: 49 MG/DL (ref 6–20)
BUN SERPL-MCNC: 51 MG/DL (ref 6–20)
BUN SERPL-MCNC: 52 MG/DL (ref 6–20)
CA-I BLDV-SCNC: 1.09 MMOL/L (ref 1.06–1.42)
CA-I BLDV-SCNC: 1.12 MMOL/L (ref 1.06–1.42)
CA-I BLDV-SCNC: 1.13 MMOL/L (ref 1.06–1.42)
CALCIUM SERPL-MCNC: 8.6 MG/DL (ref 8.7–10.5)
CALCIUM SERPL-MCNC: 9 MG/DL (ref 8.7–10.5)
CALCIUM SERPL-MCNC: 9.4 MG/DL (ref 8.7–10.5)
CHLORIDE SERPL-SCNC: 103 MMOL/L (ref 95–110)
CHLORIDE SERPL-SCNC: 104 MMOL/L (ref 95–110)
CHLORIDE SERPL-SCNC: 104 MMOL/L (ref 95–110)
CO2 SERPL-SCNC: 23 MMOL/L (ref 23–29)
CO2 SERPL-SCNC: 23 MMOL/L (ref 23–29)
CO2 SERPL-SCNC: 26 MMOL/L (ref 23–29)
CREAT SERPL-MCNC: 3.6 MG/DL (ref 0.5–1.4)
CREAT SERPL-MCNC: 3.6 MG/DL (ref 0.5–1.4)
CREAT SERPL-MCNC: 3.8 MG/DL (ref 0.5–1.4)
DIFFERENTIAL METHOD BLD: ABNORMAL
EOSINOPHIL # BLD AUTO: 0.7 K/UL (ref 0–0.5)
EOSINOPHIL NFR BLD: 7.3 % (ref 0–8)
ERYTHROCYTE [DISTWIDTH] IN BLOOD BY AUTOMATED COUNT: 17.5 % (ref 11.5–14.5)
EST. GFR  (NO RACE VARIABLE): 13.6 ML/MIN/1.73 M^2
EST. GFR  (NO RACE VARIABLE): 14.5 ML/MIN/1.73 M^2
EST. GFR  (NO RACE VARIABLE): 14.5 ML/MIN/1.73 M^2
GLUCOSE SERPL-MCNC: 112 MG/DL (ref 70–110)
GLUCOSE SERPL-MCNC: 117 MG/DL (ref 70–110)
GLUCOSE SERPL-MCNC: 131 MG/DL (ref 70–110)
HCO3 UR-SCNC: 25.7 MMOL/L (ref 24–28)
HCT VFR BLD AUTO: 24.4 % (ref 37–48.5)
HGB BLD-MCNC: 7.5 G/DL (ref 12–16)
IMM GRANULOCYTES # BLD AUTO: 0.07 K/UL (ref 0–0.04)
IMM GRANULOCYTES NFR BLD AUTO: 0.8 % (ref 0–0.5)
LYMPHOCYTES # BLD AUTO: 1.8 K/UL (ref 1–4.8)
LYMPHOCYTES NFR BLD: 19.2 % (ref 18–48)
MAGNESIUM SERPL-MCNC: 1.8 MG/DL (ref 1.6–2.6)
MAGNESIUM SERPL-MCNC: 2.2 MG/DL (ref 1.6–2.6)
MCH RBC QN AUTO: 27.9 PG (ref 27–31)
MCHC RBC AUTO-ENTMCNC: 30.7 G/DL (ref 32–36)
MCV RBC AUTO: 91 FL (ref 82–98)
MONOCYTES # BLD AUTO: 1.2 K/UL (ref 0.3–1)
MONOCYTES NFR BLD: 12.7 % (ref 4–15)
NEUTROPHILS # BLD AUTO: 5.5 K/UL (ref 1.8–7.7)
NEUTROPHILS NFR BLD: 59.1 % (ref 38–73)
NRBC BLD-RTO: 0 /100 WBC
PCO2 BLDA: 39.9 MMHG (ref 35–45)
PH SMN: 7.42 [PH] (ref 7.35–7.45)
PHOSPHATE SERPL-MCNC: 6 MG/DL (ref 2.7–4.5)
PHOSPHATE SERPL-MCNC: 6.5 MG/DL (ref 2.7–4.5)
PHOSPHATE SERPL-MCNC: 6.6 MG/DL (ref 2.7–4.5)
PLATELET # BLD AUTO: 379 K/UL (ref 150–450)
PMV BLD AUTO: 10 FL (ref 9.2–12.9)
PO2 BLDA: 80 MMHG (ref 80–100)
POC BE: 1 MMOL/L
POC SATURATED O2: 96 % (ref 95–100)
POC TCO2: 27 MMOL/L (ref 23–27)
POCT GLUCOSE: 107 MG/DL (ref 70–110)
POCT GLUCOSE: 112 MG/DL (ref 70–110)
POCT GLUCOSE: 115 MG/DL (ref 70–110)
POCT GLUCOSE: 127 MG/DL (ref 70–110)
POCT GLUCOSE: 131 MG/DL (ref 70–110)
POCT GLUCOSE: 89 MG/DL (ref 70–110)
POTASSIUM SERPL-SCNC: 4.6 MMOL/L (ref 3.5–5.1)
POTASSIUM SERPL-SCNC: 4.6 MMOL/L (ref 3.5–5.1)
POTASSIUM SERPL-SCNC: 4.8 MMOL/L (ref 3.5–5.1)
PROT SERPL-MCNC: 7.2 G/DL (ref 6–8.4)
RBC # BLD AUTO: 2.69 M/UL (ref 4–5.4)
SAMPLE: NORMAL
SITE: NORMAL
SODIUM SERPL-SCNC: 135 MMOL/L (ref 136–145)
SODIUM SERPL-SCNC: 137 MMOL/L (ref 136–145)
SODIUM SERPL-SCNC: 139 MMOL/L (ref 136–145)
WBC # BLD AUTO: 9.22 K/UL (ref 3.9–12.7)

## 2024-02-19 PROCEDURE — 27000207 HC ISOLATION

## 2024-02-19 PROCEDURE — 25000242 PHARM REV CODE 250 ALT 637 W/ HCPCS

## 2024-02-19 PROCEDURE — 87070 CULTURE OTHR SPECIMN AEROBIC: CPT

## 2024-02-19 PROCEDURE — 99900026 HC AIRWAY MAINTENANCE (STAT)

## 2024-02-19 PROCEDURE — 99232 SBSQ HOSP IP/OBS MODERATE 35: CPT | Mod: ,,, | Performed by: NURSE PRACTITIONER

## 2024-02-19 PROCEDURE — 87206 SMEAR FLUORESCENT/ACID STAI: CPT

## 2024-02-19 PROCEDURE — 87075 CULTR BACTERIA EXCEPT BLOOD: CPT

## 2024-02-19 PROCEDURE — 82330 ASSAY OF CALCIUM: CPT | Mod: 91 | Performed by: STUDENT IN AN ORGANIZED HEALTH CARE EDUCATION/TRAINING PROGRAM

## 2024-02-19 PROCEDURE — 63600175 PHARM REV CODE 636 W HCPCS: Mod: JZ,JG

## 2024-02-19 PROCEDURE — 94003 VENT MGMT INPAT SUBQ DAY: CPT

## 2024-02-19 PROCEDURE — 84100 ASSAY OF PHOSPHORUS: CPT | Performed by: STUDENT IN AN ORGANIZED HEALTH CARE EDUCATION/TRAINING PROGRAM

## 2024-02-19 PROCEDURE — 80053 COMPREHEN METABOLIC PANEL: CPT

## 2024-02-19 PROCEDURE — 80069 RENAL FUNCTION PANEL: CPT | Performed by: STUDENT IN AN ORGANIZED HEALTH CARE EDUCATION/TRAINING PROGRAM

## 2024-02-19 PROCEDURE — 36600 WITHDRAWAL OF ARTERIAL BLOOD: CPT

## 2024-02-19 PROCEDURE — 85025 COMPLETE CBC W/AUTO DIFF WBC: CPT

## 2024-02-19 PROCEDURE — 87205 SMEAR GRAM STAIN: CPT

## 2024-02-19 PROCEDURE — 83735 ASSAY OF MAGNESIUM: CPT | Performed by: STUDENT IN AN ORGANIZED HEALTH CARE EDUCATION/TRAINING PROGRAM

## 2024-02-19 PROCEDURE — 99233 SBSQ HOSP IP/OBS HIGH 50: CPT | Mod: ,,, | Performed by: INTERNAL MEDICINE

## 2024-02-19 PROCEDURE — 99900035 HC TECH TIME PER 15 MIN (STAT)

## 2024-02-19 PROCEDURE — 99233 SBSQ HOSP IP/OBS HIGH 50: CPT | Mod: 95,,, | Performed by: STUDENT IN AN ORGANIZED HEALTH CARE EDUCATION/TRAINING PROGRAM

## 2024-02-19 PROCEDURE — 87102 FUNGUS ISOLATION CULTURE: CPT

## 2024-02-19 PROCEDURE — 99291 CRITICAL CARE FIRST HOUR: CPT | Mod: 24,25,, | Performed by: SURGERY

## 2024-02-19 PROCEDURE — 25000003 PHARM REV CODE 250: Performed by: STUDENT IN AN ORGANIZED HEALTH CARE EDUCATION/TRAINING PROGRAM

## 2024-02-19 PROCEDURE — 63600175 PHARM REV CODE 636 W HCPCS: Performed by: STUDENT IN AN ORGANIZED HEALTH CARE EDUCATION/TRAINING PROGRAM

## 2024-02-19 PROCEDURE — 27100171 HC OXYGEN HIGH FLOW UP TO 24 HOURS

## 2024-02-19 PROCEDURE — 25000003 PHARM REV CODE 250

## 2024-02-19 PROCEDURE — 87116 MYCOBACTERIA CULTURE: CPT

## 2024-02-19 PROCEDURE — 99232 SBSQ HOSP IP/OBS MODERATE 35: CPT | Mod: ,,, | Performed by: INTERNAL MEDICINE

## 2024-02-19 PROCEDURE — 87070 CULTURE OTHR SPECIMN AEROBIC: CPT | Mod: 59

## 2024-02-19 PROCEDURE — 83735 ASSAY OF MAGNESIUM: CPT | Mod: 91 | Performed by: STUDENT IN AN ORGANIZED HEALTH CARE EDUCATION/TRAINING PROGRAM

## 2024-02-19 PROCEDURE — 63600175 PHARM REV CODE 636 W HCPCS

## 2024-02-19 PROCEDURE — 80069 RENAL FUNCTION PANEL: CPT | Mod: 91 | Performed by: STUDENT IN AN ORGANIZED HEALTH CARE EDUCATION/TRAINING PROGRAM

## 2024-02-19 PROCEDURE — 94761 N-INVAS EAR/PLS OXIMETRY MLT: CPT | Mod: XB

## 2024-02-19 PROCEDURE — 20000000 HC ICU ROOM

## 2024-02-19 PROCEDURE — 82803 BLOOD GASES ANY COMBINATION: CPT

## 2024-02-19 RX ORDER — MAGNESIUM SULFATE HEPTAHYDRATE 40 MG/ML
2 INJECTION, SOLUTION INTRAVENOUS ONCE
Status: COMPLETED | OUTPATIENT
Start: 2024-02-19 | End: 2024-02-19

## 2024-02-19 RX ORDER — HYDROMORPHONE HYDROCHLORIDE 1 MG/ML
1 INJECTION, SOLUTION INTRAMUSCULAR; INTRAVENOUS; SUBCUTANEOUS ONCE
Status: COMPLETED | OUTPATIENT
Start: 2024-02-19 | End: 2024-02-19

## 2024-02-19 RX ADMIN — PSYLLIUM HUSK 1 PACKET: 3.4 POWDER ORAL at 08:02

## 2024-02-19 RX ADMIN — FUROSEMIDE 120 MG: 10 INJECTION, SOLUTION INTRAVENOUS at 08:02

## 2024-02-19 RX ADMIN — AMLODIPINE BESYLATE 10 MG: 10 TABLET ORAL at 09:02

## 2024-02-19 RX ADMIN — HEPARIN SODIUM 7500 UNITS: 5000 INJECTION INTRAVENOUS; SUBCUTANEOUS at 09:02

## 2024-02-19 RX ADMIN — MAGNESIUM SULFATE HEPTAHYDRATE 2 G: 40 INJECTION, SOLUTION INTRAVENOUS at 05:02

## 2024-02-19 RX ADMIN — PIPERACILLIN SODIUM AND TAZOBACTAM SODIUM 4.5 G: 4; .5 INJECTION, POWDER, FOR SOLUTION INTRAVENOUS at 09:02

## 2024-02-19 RX ADMIN — CARVEDILOL 25 MG: 25 TABLET, FILM COATED ORAL at 09:02

## 2024-02-19 RX ADMIN — ACETAMINOPHEN 650 MG: 325 TABLET ORAL at 11:02

## 2024-02-19 RX ADMIN — HEPARIN SODIUM 7500 UNITS: 5000 INJECTION INTRAVENOUS; SUBCUTANEOUS at 02:02

## 2024-02-19 RX ADMIN — CARVEDILOL 25 MG: 25 TABLET, FILM COATED ORAL at 08:02

## 2024-02-19 RX ADMIN — INSULIN DETEMIR 14 UNITS: 100 INJECTION, SOLUTION SUBCUTANEOUS at 09:02

## 2024-02-19 RX ADMIN — FUROSEMIDE 120 MG: 10 INJECTION, SOLUTION INTRAVENOUS at 09:02

## 2024-02-19 RX ADMIN — HYDROMORPHONE HYDROCHLORIDE 1 MG: 1 INJECTION, SOLUTION INTRAMUSCULAR; INTRAVENOUS; SUBCUTANEOUS at 01:02

## 2024-02-19 RX ADMIN — PIPERACILLIN SODIUM AND TAZOBACTAM SODIUM 4.5 G: 4; .5 INJECTION, POWDER, FOR SOLUTION INTRAVENOUS at 06:02

## 2024-02-19 RX ADMIN — METRONIDAZOLE 500 MG: 5 INJECTION, SOLUTION INTRAVENOUS at 03:02

## 2024-02-19 RX ADMIN — FUROSEMIDE 120 MG: 10 INJECTION, SOLUTION INTRAVENOUS at 03:02

## 2024-02-19 RX ADMIN — PSYLLIUM HUSK 1 PACKET: 3.4 POWDER ORAL at 09:02

## 2024-02-19 RX ADMIN — HEPARIN SODIUM 7500 UNITS: 5000 INJECTION INTRAVENOUS; SUBCUTANEOUS at 05:02

## 2024-02-19 NOTE — SUBJECTIVE & OBJECTIVE
History reviewed. No pertinent past medical history.    Past Surgical History:   Procedure Laterality Date    INCISION AND DRAINAGE OF PERIRECTAL REGION N/A 1/29/2024    Procedure: INCISION AND DRAINAGE, PERIRECTAL REGION;  Surgeon: Axel Ramsay MD;  Location: [facility] OR;  Service: General;  Laterality: N/A;    LUMBAR PUNCTURE N/A 2/16/2024    Procedure: Lumbar Puncture;  Surgeon: Macy Boykin;  Location: [facility];  Service: Anesthesiology;  Laterality: N/A;    PLACEMENT, TRIALYSIS CATH Right 1/31/2024    Procedure: INSERTION, CATHETER, TRIPLE LUMEN, HEMODIALYSIS, TEMPORARY;  Surgeon: Alvin Junior MD;  Location: [facility] OR;  Service: General;  Laterality: Right;    REPLACEMENT OF WOUND VACUUM-ASSISTED CLOSURE DEVICE Right 2/12/2024    Procedure: REPLACEMENT, WOUND VAC;  Surgeon: Mundo Carmona MD;  Location: [facility] OR [address] [place] [address];  Service: General;  Laterality: Right;    REPLACEMENT OF WOUND VACUUM-ASSISTED CLOSURE DEVICE N/A 2/15/2024    Procedure: REPLACEMENT, WOUND VAC;  Surgeon: Steve Cole MD;  [facility];  Service: General;  Laterality: N/A;    WOUND DEBRIDEMENT Bilateral 2/2/2024    Procedure: DEBRIDEMENT, WOUND;  Surgeon: Steve Cole MD;  Location: [address];  Service: General;  Laterality: Bilateral;  Bilateral groin  Possible wound vac placement    WOUND DEBRIDEMENT Right 2/6/2024    Procedure: DEBRIDEMENT, WOUND, replace wound vac, possible closure;  Surgeon: Steve Cole MD;  [facility];  Service: General;  Laterality: Right;  RLE    WOUND DEBRIDEMENT Right 2/9/2024    Procedure: DEBRIDEMENT, WOUND w wound vac change;  Surgeon: Steve Cole MD;  [facility];  Service: General;  Laterality: Right;  RLE    WOUND DEBRIDEMENT Right 2/12/2024    Procedure: R thigh wound debridement;  Surgeon: Mundo Carmona MD;  [facility];  Service: General;  Laterality: Right;    WOUND EXPLORATION Right 1/31/2024    Procedure: IRRIGATION &  DEBRIDEMENT, WOUND DEBRIDEMENT;  Surgeon: Alvin Junior MD;  Location: [facility] OR;  Service: General;  Laterality: Right;       Review of patient's allergies indicates:  No Known Allergies    Medications:  No medications prior to admission.     Antibiotics (From admission, onward)      Start     Stop Route Frequency Ordered    02/19/24 0900  piperacillin-tazobactam (ZOSYN) 4.5 g in dextrose 5 % in water (D5W) 100 mL IVPB (MB+)         -- IV Every 8 hours (non-standard times) 02/19/24 0811          Antifungals (From admission, onward)      None          Antivirals (From admission, onward)      None             Immunization History   Administered Date(s) Administered    COVID-19, MRNA, LN-S, PF (Pfizer) (Purple Cap) 05/24/2021, 06/14/2021    PPD Test 08/16/2022       Family History    None       Social History     Socioeconomic History    Marital status: Single     Social Determinants of Health     Financial Resource Strain: Patient Unable To Answer (1/31/2024)    Overall Financial Resource Strain (CARDIA)     Difficulty of Paying Living Expenses: Patient unable to answer   Food Insecurity: Patient Declined (1/31/2024)    Hunger Vital Sign     Worried About Running Out of Food in the Last Year: Patient declined     Ran Out of Food in the Last Year: Patient declined   Transportation Needs: Patient Unable To Answer (1/31/2024)    PRAPARE - Transportation     Lack of Transportation (Medical): Patient unable to answer     Lack of Transportation (Non-Medical): Patient unable to answer   Stress: Patient Unable To Answer (1/31/2024)    [employer] - Occupational Stress Questionnaire     Feeling of Stress : Patient unable to answer   Housing Stability: Patient Unable To Answer (1/31/2024)    Housing Stability Vital Sign     Unable to Pay for Housing in the Last Year: Patient unable to answer     Unstable Housing in the Last Year: Patient unable to answer     Review of Systems   Unable to perform  ROS: Intubated     Objective:     Vital Signs (Most Recent):  Temp: 99.1 °F (37.3 °C) (02/19/24 1500)  Pulse: 80 (02/19/24 1531)  Resp: 14 (02/19/24 1531)  BP: 119/62 (02/19/24 1500)  SpO2: 100 % (02/19/24 1531) Vital Signs (24h Range):  Temp:  [98.3 °F (36.8 °C)-99.2 °F (37.3 °C)] 99.1 °F (37.3 °C)  Pulse:  [77-95] 80  Resp:  [12-39] 14  SpO2:  [99 %-100 %] 100 %  BP: (112-162)/(56-89) 119/62     Weight: (!) 154.2 kg (340 lb)  Body mass index is 56.58 kg/m².    Estimated Creatinine Clearance: 27.4 mL/min (A) (based on SCr of 3.6 mg/dL (H)).     Physical Exam  Vitals and nursing note reviewed.   Constitutional:       Appearance: She is well-developed. She is ill-appearing.      Interventions: She is intubated.   HENT:      Head: Normocephalic and atraumatic.      Right Ear: External ear normal.      Left Ear: External ear normal.   Eyes:      General: No scleral icterus.        Right eye: No discharge.         Left eye: No discharge.      Conjunctiva/sclera: Conjunctivae normal.   Cardiovascular:      Rate and Rhythm: Normal rate and regular rhythm.      Heart sounds: Normal heart sounds. No murmur heard.     No friction rub. No gallop.   Pulmonary:      Effort: Pulmonary effort is normal. No respiratory distress. She is intubated.      Breath sounds: No stridor. No wheezing, rhonchi or rales.   Abdominal:      General: Bowel sounds are normal.   Musculoskeletal:         General: No tenderness.   Skin:     General: Skin is warm and dry.      Comments: Wound VAC on right groin area   Neurological:      Mental Status: She is lethargic.      Comments: Does not respond to verbal, tactile or painful stimuli.           Significant Labs: BMP:   Recent Labs   Lab 02/19/24  0159 02/19/24  1311   * 117*   * 137   K 4.6 4.6    104   CO2 23 23   BUN 49* 52*   CREATININE 3.6* 3.6*   CALCIUM 8.6* 9.0   MG 1.8  --      CBC:   Recent Labs   Lab 02/18/24  0338 02/19/24  0159   WBC 10.13 9.22   HGB 7.4* 7.5*   HCT  23.9* 24.4*    379     Microbiology Results (last 7 days)       Procedure Component Value Units Date/Time    AFB Culture & Smear [[id number]] Collected: 02/19/24 0820    Order Status: Sent Specimen: CSF (Spinal Fluid) from Cerebrospinal fluid (CSF) Updated: 02/19/24 1525    Culture, Anaerobe [[id number]] Collected: 02/19/24 0820    Order Status: Sent Specimen: CSF (Spinal Fluid) from Cerebrospinal fluid (CSF) Updated: 02/19/24 1524    Fungus culture [[id number]] Collected: 02/19/24 0820    Order Status: Sent Specimen: CSF (Spinal Fluid) from Cerebrospinal fluid (CSF) Updated: 02/19/24 1524    Aerobic culture [[id number]] Collected: 02/19/24 0820    Order Status: Sent Specimen: CSF (Spinal Fluid) from Cerebrospinal fluid (CSF) Updated: 02/19/24 1524    CSF culture [[id number]] Collected: 02/19/24 0820    Order Status: Sent Specimen: CSF (Spinal Fluid) from Cerebrospinal fluid (CSF) Updated: 02/19/24 1523    Gram stain [[id number]] Collected: 02/19/24 0820    Order Status: Canceled Specimen: CSF (Spinal Fluid) from Cerebrospinal fluid (CSF)     Urine culture [[id number]]  (Abnormal)  (Susceptibility) Collected: 02/16/24 1214    Order Status: Completed Specimen: Urine Updated: 02/18/24 1113     Urine Culture, Routine CHRYSEOBACTERIUM INDOLOGENES  > 100,000 cfu/ml      Narrative:      Specimen Source->Urine    Urine culture [[id number]]  (Abnormal)  (Susceptibility) Collected: 02/14/24 1249    Order Status: Completed Specimen: Urine Updated: 02/17/24 1411     Urine Culture, Routine BURKHOLDERIA SPECIES  >100,000 cfu/ml  Further identified as B. multivorans      Narrative:      Indicated criteria for high risk culture:->Other  Other (specify):->fever    Urine culture [[id number]] Collected: 02/14/24 1249    Order Status: Completed Specimen: Urine Updated: 02/15/24 2205     Urine Culture, Routine Multiple organisms isolated. None in predominance.  Repeat if      clinically necessary.    Narrative:      Specimen  Source->Urine    Culture, Anaerobe [[id number]]  (Abnormal) Collected: 01/30/24 0228    Order Status: Completed Specimen: Wound from Groin Updated: 02/14/24 1139     Anaerobic Culture BACTEROIDES SPECIES  Few      Narrative:      Right groin tissue    Blood culture [[id number]]     Order Status: Canceled Specimen: Blood           Urine Studies:   Recent Labs   Lab 01/30/24  0415 02/14/24  1249 02/16/24  1214   COLORU Orange*   < > Yellow   APPEARANCEUA Cloudy*   < > Ex.Turbid   PHUR 6.0   < > 5.0   SPECGRAV 1.020   < > 1.010   PROTEINUA 2+*   < > 1+*   GLUCUA 4+*   < > Negative   KETONESU Trace*   < > Negative   BILIRUBINUA Negative   < > Negative   OCCULTUA 3+*   < > 2+*   NITRITE Negative   < > Positive*   UROBILINOGEN Negative  --   --    LEUKOCYTESUR Negative   < > 3+*   RBCUA 74*   < > 21*   WBCUA 2   < > >100*   BACTERIA Occasional   < > Many*   SQUAMEPITHEL 2   < > 4   HYALINECASTS 0   < > 7*    < > = values in this interval not displayed.       Significant Imaging: I have reviewed all pertinent imaging results/findings within the past 24 hours.

## 2024-02-19 NOTE — PLAN OF CARE
[facility] - Surgical Intensive Care  Discharge Reassessment    Primary Care Provider: [facility]    Expected Discharge Date: 2/23/2024    Reassessment (most recent)       Discharge Reassessment - 02/19/24 1353          Discharge Reassessment    Assessment Type Discharge Planning Reassessment     Communicated ZEKE with patient/caregiver Date not available/Unable to determine     Discharge Plan A Long-term acute care facility (LTAC)     Discharge Plan B Skilled Nursing Facility     DME Needed Upon Discharge  other (see comments)   TBD    Transition of Care Barriers None     Why the patient remains in the hospital Requires continued medical care                     Per MD's Note, Interval History: NAEO. VSS. AF. OR today for wound vac change.        Discharge Plan A and Plan B have been determined by review of patient's clinical status, future medical and therapeutic needs, and coverage/benefits for post-acute care in coordination with multidisciplinary team members.     Jahaira Mckeon LMSW  Case Management [facility]

## 2024-02-19 NOTE — SUBJECTIVE & OBJECTIVE
Interval History: Intubated, not sedated, opening left eye spontaneously but not tracking or making meaningful eye contact. Does not squeeze hand to command.    Past Medical History:  T2DM  CKD3a    Past Surgical History:   Procedure Laterality Date    INCISION AND DRAINAGE OF PERIRECTAL REGION N/A 1/29/2024    Procedure: INCISION AND DRAINAGE, PERIRECTAL REGION;  Surgeon: Axel Ramsay MD;  Location: [facility] OR;  Service: General;  Laterality: N/A;    LUMBAR PUNCTURE N/A 2/16/2024    Procedure: Lumbar Puncture;  Surgeon: Macy Boykin;  Location: [facility];  Service: Anesthesiology;  Laterality: N/A;    PLACEMENT, TRIALYSIS CATH Right 1/31/2024    Procedure: INSERTION, CATHETER, TRIPLE LUMEN, HEMODIALYSIS, TEMPORARY;  Surgeon: Alvin Junior MD;  Location: [facility] OR;  Service: General;  Laterality: Right;    REPLACEMENT OF WOUND VACUUM-ASSISTED CLOSURE DEVICE Right 2/12/2024    Procedure: REPLACEMENT, WOUND VAC;  Surgeon: Mundo Carmona MD;  Location: [address];  Service: General;  Laterality: Right;    REPLACEMENT OF WOUND VACUUM-ASSISTED CLOSURE DEVICE N/A 2/15/2024    Procedure: REPLACEMENT, WOUND VAC;  Surgeon: Steve Cole MD;  Location: [address];  Service: General;  Laterality: N/A;    WOUND DEBRIDEMENT Bilateral 2/2/2024    Procedure: DEBRIDEMENT, WOUND;  Surgeon: Steve Cole MD;  Location: [address];  Service: General;  Laterality: Bilateral;  Bilateral groin  Possible wound vac placement    WOUND DEBRIDEMENT Right 2/6/2024    Procedure: DEBRIDEMENT, WOUND, replace wound vac, possible closure;  Surgeon: Steve Cole MD;  Location: [address];  Service: General;  Laterality: Right;  RLE    WOUND DEBRIDEMENT Right 2/9/2024    Procedure: DEBRIDEMENT, WOUND w wound vac change;  Surgeon: Steve Cole MD;  Location: [address];  Service: General;  Laterality: Right;  RLE    WOUND DEBRIDEMENT Right 2/12/2024    Procedure: R thigh wound debridement;  Surgeon: Kristine  Mundo BELTRAN MD;  [address];  Service: General;  Laterality: Right;    WOUND EXPLORATION Right 1/31/2024    Procedure: IRRIGATION & DEBRIDEMENT, WOUND DEBRIDEMENT;  Surgeon: Alvin Junior MD;  Location: [facility];  Service: General;  Laterality: Right;       Review of patient's allergies indicates:  No Known Allergies    Medications:  Continuous Infusions:   dextrose 10 % in water (D10W)       Scheduled Meds:   sodium chloride 0.9%   Intravenous Once    amLODIPine  10 mg Per OG tube Daily    carvediloL  25 mg Per OG tube BID    furosemide (LASIX) injection  120 mg Intravenous TID    heparin (porcine)  7,500 Units Subcutaneous Q8H    insulin aspart U-100  6 Units Subcutaneous Q4H    insulin detemir U-100  14 Units Subcutaneous Daily    piperacillin-tazobactam (Zosyn) IV (PEDS and ADULTS) (extended infusion is not appropriate)  4.5 g Intravenous Q8H    psyllium husk (aspartame)  1 packet Per OG tube BID    sevelamer carbonate  1.6 g Per OG tube TID WM     PRN Meds:0.9%  NaCl infusion (for blood administration), sodium chloride 0.9%, acetaminophen, albuterol-ipratropium, dextrose 10 % in water (D10W), dextrose 10%, dextrose 10%, glucagon (human recombinant), glucose, glucose, hydrALAZINE, insulin aspart U-100, labetalol, naloxone, ondansetron, oxyCODONE, prochlorperazine, sodium chloride 0.9%, sodium chloride 0.9%, sodium chloride 0.9%    Family History    None       Tobacco Use    Smoking status: Not on file    Smokeless tobacco: Not on file   Substance and Sexual Activity    Alcohol use: Not on file    Drug use: Not on file    Sexual activity: Not on file       Review of Systems   Unable to perform ROS: Mental status change     Objective:     Vital Signs (Most Recent):  Temp: 99.2 °F (37.3 °C) (02/19/24 0700)  Pulse: 91 (02/19/24 0900)  Resp: 18 (02/19/24 0900)  BP: (!) 147/71 (02/19/24 0900)  SpO2: 100 % (02/19/24 0900) Vital Signs (24h Range):  Temp:  [98.4 °F (36.9 °C)-99.6 °F (37.6 °C)] 99.2 °F  (37.3 °C)  Pulse:  [79-95] 91  Resp:  [15-39] 18  SpO2:  [99 %-100 %] 100 %  BP: (112-162)/(56-89) 147/71     Weight: (!) 154.2 kg (340 lb)  Body mass index is 56.58 kg/m².       Physical Exam  Constitutional:       Appearance: She is obese.   HENT:      Head: Normocephalic and atraumatic.      Right Ear: External ear normal.      Left Ear: External ear normal.      Nose: Nose normal.      Mouth/Throat:      Mouth: Mucous membranes are dry.   Eyes:      Conjunctiva/sclera: Conjunctivae normal.   Cardiovascular:      Rate and Rhythm: Normal rate.   Pulmonary:      Breath sounds: Normal breath sounds.      Comments: On spontaneous breathing trial, ETT connected to ventilator  Abdominal:      General: Bowel sounds are normal.      Palpations: Abdomen is soft.   Musculoskeletal:         General: Swelling present.   Lymphadenopathy:      Cervical: No cervical adenopathy.   Skin:     General: Skin is warm and dry.   Neurological:      Comments: Not following commands despite no sedation            Review of Symptoms      Symptom Assessment (ESAS 0-10 Scale)  Pain:  0  Dyspnea:  0  Anxiety:  0  Nausea:  0  Depression:  0  Anorexia:  0  Fatigue:  0  Insomnia:  0  Restlessness:  0  Agitation:  0 due to Mental status change         Pain Assessment in Advanced Demential Scale (PAINAD)   Breathing - Independent of vocalization:  0  Negative vocalization:  0  Facial expression:  0  Body language:  0  Consolability:  0  Total:  0    Living Arrangements:  Lives with spouse    Psychosocial/Cultural:   See Palliative Psychosocial Note: No  Lives with long-time partner - not legally , together for 26 to 28 years. Has adult daughter (age 31) and adult son, who is in half-way. Has five grandchildren. Enjoyed watching movies, going out to eat, shopping. Worked as a patient caregiver for blind patients.  **Primary  to Follow**  Palliative Care  Consult: No    Spiritual:  F - [place] and Belief:  Believes in  God  I - Importance:  Important  C - Community:  Does not attend [employer]  A - Address in Care:  Engage  support        Advance Care Planning   Advance Directives:   Living Will: No    LaPOST: No    Do Not Resuscitate Status: No    Medical Power of : No      Decision Making:  Family answered questions  Goals of Care: What is most important right now is to focus on curative/life-prolongation (regardless of treatment burdens). Accordingly, we have decided that the best plan to meet the patient's goals includes continuing with treatment.         Significant Labs: CBC:   Recent Labs   Lab 02/18/24 0338 02/19/24 0159   WBC 10.13 9.22   HGB 7.4* 7.5*   HCT 23.9* 24.4*    379       CMP:   Recent Labs   Lab 02/18/24 0338 02/18/24  1453 02/18/24 2204 02/19/24 0159    137 135*  135* 135*   K 4.5 4.7 4.5  4.5 4.6    105 103  103 103   CO2 21* 22* 22*  22* 23   * 144* 128*  128* 112*   BUN 44* 47* 46*  46* 49*   CREATININE 3.5* 3.6* 3.9*  3.9* 3.6*   CALCIUM 8.5* 8.5* 8.8  8.8 8.6*   PROT 7.0  --   --  7.2   ALBUMIN 1.8* 1.8* 1.8*  1.8* 1.9*   BILITOT 0.2  --   --  0.2   ALKPHOS 125  --   --  123   AST 22  --   --  30   ALT 10  --   --  11   ANIONGAP 10 10 10  10 9       CBC:   Recent Labs   Lab 02/19/24 0159   WBC 9.22   HGB 7.5*   HCT 24.4*   MCV 91          BMP:  Recent Labs   Lab 02/19/24 0159   *   *   K 4.6      CO2 23   BUN 49*   CREATININE 3.6*   CALCIUM 8.6*   MG 1.8       LFT:  Lab Results   Component Value Date    AST 30 02/19/2024    ALKPHOS 123 02/19/2024    BILITOT 0.2 02/19/2024     Albumin:   Albumin   Date Value Ref Range Status   02/19/2024 1.9 (L) 3.5 - 5.2 g/dL Final     Protein:   Total Protein   Date Value Ref Range Status   02/19/2024 7.2 6.0 - 8.4 g/dL Final     Lactic acid:   Lab Results   Component Value Date    LACTATE 1.5 02/01/2024    LACTATE 2.4 (H) 01/31/2024       Significant Imaging: I have reviewed all pertinent  imaging results/findings within the past 24 hours.

## 2024-02-19 NOTE — PROGRESS NOTES
[facility]  Critical Care - Surgery  Progress Note    Patient Name: Sarah Saravia  MRN: [id number]  Admission Date: 1/29/2024  Hospital Length of Stay: 21 days  Code Status: Full Code  Attending Provider: Steve Cole MD  [facility]   Principal Problem: Ayaz's gangrene    Subjective:     Hospital/ICU Course:  No notes on file    Interval History/Significant Events: Pt seen and examined at bedside. BISHNU MULLER.    Follow-up For: Procedure(s) (LRB):  Lumbar Puncture (N/A)    Post-Operative Day: 3 Days Post-Op    Objective:     Vital Signs (Most Recent):  Temp: 98.4 °F (36.9 °C) (02/19/24 0315)  Pulse: 82 (02/19/24 0615)  Resp: 17 (02/19/24 0615)  BP: (!) 154/80 (02/19/24 0600)  SpO2: 100 % (02/19/24 0615) Vital Signs (24h Range):  Temp:  [98.4 °F (36.9 °C)-99.7 °F (37.6 °C)] 98.4 °F (36.9 °C)  Pulse:  [80-95] 82  Resp:  [15-39] 17  SpO2:  [99 %-100 %] 100 %  BP: (112-162)/(56-89) 154/80     Weight: (!) 154.2 kg (340 lb)  Body mass index is 56.58 kg/m².      Intake/Output Summary (Last 24 hours) at 2/19/2024 0650  Last data filed at 2/19/2024 0600  Gross per 24 hour   Intake 1046.18 ml   Output 2740 ml   Net -1693.82 ml          Physical Exam   Vitals and nursing note reviewed.   Constitutional:       General: She is not in acute distress.     Appearance: She is ill-appearing.   HENT:      Head: Normocephalic and atraumatic.   Eyes:      Extraocular Movements: Extraocular movements intact.      Conjunctiva/sclera: Conjunctivae normal.   Neck:      Comments: Left IJ Trialysis catheter  Cardiovascular:      Rate and Rhythm: Normal rate and regular rhythm.   Pulmonary:      Effort: Pulmonary effort is normal.      Comments: Intubated, on spontaneous     Abdominal:      General: There is no distension.      Palpations: Abdomen is soft.      Tenderness: There is no abdominal tenderness.   Genitourinary:     Comments: Glynn in place      Skin:     General: Skin is warm and dry.      Comments: Wound vac in place to R thigh/groin to suction   Neurological:      General: No focal deficit present.      Mental Status: She is oriented to person, place, and time.   Psychiatric:         Mood and Affect: Mood normal.         Behavior: Behavior normal.    Vents:  Vent Mode: Spont (02/19/24 0435)  Set Rate: 18 BPM (02/06/24 0349)  Vt Set: 420 mL (02/06/24 0349)  Pressure Support: 10 cmH20 (02/19/24 0435)  PEEP/CPAP: 5 cmH20 (02/19/24 0435)  Oxygen Concentration (%): 30 (02/19/24 0615)  Peak Airway Pressure: 15 cmH20 (02/19/24 0435)  Plateau Pressure: 15 cmH20 (02/19/24 0435)  Total Ve: 7.4 L/m (02/19/24 0435)  Negative Inspiratory Force (cm H2O): 0 (02/19/24 0435)  F/VT Ratio<105 (RSBI): (!) 42.04 (02/19/24 0435)    Lines/Drains/Airways       Central Venous Catheter Line  Duration             Trialysis (Dialysis) Catheter 02/11/24 2303 left internal jugular 7 days              Drain  Duration                  NG/OG Tube 02/06/24 1030 Center mouth 12 days         Urethral Catheter 02/16/24 1215 2 days              Airway  Duration                  Airway - Non-Surgical 01/31/24 1020 18 days              Peripheral Intravenous Line  Duration                  Peripheral IV - Single Lumen 02/13/24 1132 18 G;1 3/4 in Right Forearm 5 days                    Significant Labs:    CBC/Anemia Profile:  Recent Labs   Lab 02/18/24 0338 02/19/24  0159   WBC 10.13 9.22   HGB 7.4* 7.5*   HCT 23.9* 24.4*    379   MCV 88 91   RDW 17.3* 17.5*        Chemistries:  Recent Labs   Lab 02/18/24 0338 02/18/24  1453 02/18/24  2204 02/19/24  0159    137 135*  135* 135*   K 4.5 4.7 4.5  4.5 4.6    105 103  103 103   CO2 21* 22* 22*  22* 23   BUN 44* 47* 46*  46* 49*   CREATININE 3.5* 3.6* 3.9*  3.9* 3.6*   CALCIUM 8.5* 8.5* 8.8  8.8 8.6*   ALBUMIN 1.8* 1.8* 1.8*  1.8* 1.9*   PROT 7.0  --   --  7.2   BILITOT 0.2  --   --  0.2   ALKPHOS 125  --   --  123    ALT 10  --   --  11   AST 22  --   --  30   MG 1.9 1.8 1.8  1.8 1.8   PHOS 5.4* 5.6* 5.8*  5.8* 6.0*       Significant Imaging:  I have reviewed all pertinent imaging results/findings within the past 24 hours.  Assessment/Plan:     Renal/  * Ayaz's gangrene    Neuro/Psych:   #Acute Encephalopathy  CTH 2/3 with scattered hypoattenuation likely representing age indeterminate infarcts. EEG findings consistent with moderate-severe encephalopathy. MRI 2/6: Several scattered punctate acute infarcts throughout the bilateral frontal lobes, centrum semiovale, basal ganglia, corpus callosum, and cerebellar hemispheres.  CTA 2/6: Atherosclerotic plaquing of the carotid bifurcations and proximal ICAs with less than 50% proximal ICA stenosis by NASCET criteria . Repeat CTH and MRI/MRA unchanged from prior exams. S/p multiple wound vac exchanges. Palliative onboard with family wanting full measures.    Neuro/Psych  Repeat CTH and MRI/MRA stable from initial reads. LP 2/16. Elevated WBCs and protein consistent with bacterial meningitis.  -- Sedation: None  -- Pain: kermit tylenol, dialudid and oxy prn  -- GCS 5T: E2, V1T, M2; exam stable  -- Neurology following; appreciate recs  -- Neurovascular following previously; signed off 2/17  -- Palliative following; GOC conversation 2/7; appreciate recs             Cards:   -- HDS  -- goal SBP < 180, MAP >65  -- normotensive; hydralazine and labetalol prns  -- Continue amlodipine 10mg daily; coreg 25mg BID      Pulm:   #Acute Respiratory Failure  -- Intubated on spontaneous , Pressure support.  -- Goal O2 sat > 90%  -- Trach/PEG this week?      Renal:  #ALVARADO on CKD  #ATN  -- 2/13 LI trialysis  -- Nephrology following; appreciate recs  -- increased phos, on sevelamer, otherwise stable electrolytes.  -- 120mg IV lasix tid per nephro  -- UOP: 1.1L in 24hrs (0.3 ml/kg/hr)    Recent Labs   Lab 02/18/24  1453 02/18/24  2204 02/19/24  0159   BUN 47* 46*  46* 49*   CREATININE 3.6* 3.9*   3.9* 3.6*        FEN / GI:   -- Daily CMPs  -- NGT in place   -- Replace lytes as needed  -- Nutrition: NPO, TF (Novasource Renal) at goal 30 cc/hr.  Hold at midnight for OR 2/19  -- GI PPX   -- Nutrition following; appreciate recs  -- Continue psyllium and sevelamer      ID:    #Ayaz's gangrene  s/p wound vac exchange/ debridement x4 for nec fascitis. R groin tissue with proteus, sensitive to ceftriaxone. Growing bacteroids as well.   -- ID following ; appreciate recs  -- Urine cx 2/18 growing C. Indologens. Start on Zosyn   -- F/u LP culture  -- Wound vac exchange Monday 2/19         Heme/Onc:  #Anemia  -- Daily CBC  -- Heparin DVT ppx  -- Transfuse if Hgb <7     Endo:   #IDDM  Initially presented to OSH with DKA with latest A1C 10.4 AG Gap resolved  Placed on insulin gtt 2/3 and dced 2/12.    - q4h BG monitoring while NPO  - Detemir 14 units daily  - Novolog 6units q4h while on TFs  - Moderate dose SSI PRN  - Endocrine following, appreciate recs      PPx:   Feeding: NPO, Tube Feeds  Analgesia/Sedation: See above  Thromboembolic prevention: SQH   HOB >30: Y  Stress Ulcer ppx:none  Glucose control: Goal 140-180 g/dl, kermit detemir and novolog, SSI, Endo following  Bowel reg: psyllium  Invasive Lines/Drains/Airway: Glynn, ETT, LIJ Trialysis, PIV x2      Dispo/Code Status/Palliative:   -- SICU / Full Code   -- Pending LP cultures  -- Wound vac exchange today        Rboinson Rousseau MD  Critical Care - Surgery  [facility]

## 2024-02-19 NOTE — NURSING
Formula room notified x3 that pt's tube feeds not available on floor since 1PM. Escalated to food & nutrition manager. Will restock TF when able.

## 2024-02-19 NOTE — SUBJECTIVE & OBJECTIVE
"Interval HPI:   Overnight events: Remains intubated in SICU. BG within goal ranges on current SQ insulin regimen. TFs held since MN for wound vac exchange today. Diet NPO      Eating:   NPO  Nausea: No  Hypoglycemia and intervention: No  Fever: No  TPN and/or TF: Yes  If yes, type of TF/TPN and rate: TFs at 30 ml/hr (on hold since midnight)     BP (!) 147/71 (BP Location: Left forearm, Patient Position: Lying)   Pulse 91   Temp 99.2 °F (37.3 °C) (Oral)   Resp 18   Ht 5' 5" (1.651 m)   Wt (!) 154.2 kg (340 lb)   LMP 01/01/2024 (Approximate) Comment: tubal ligation  SpO2 100%   BMI 56.58 kg/m²     Labs Reviewed and Include    Recent Labs   Lab 02/19/24  0159   *   CALCIUM 8.6*   ALBUMIN 1.9*   PROT 7.2   *   K 4.6   CO2 23      BUN 49*   CREATININE 3.6*   ALKPHOS 123   ALT 11   AST 30   BILITOT 0.2     Lab Results   Component Value Date    WBC 9.22 02/19/2024    HGB 7.5 (L) 02/19/2024    HCT 24.4 (L) 02/19/2024    MCV 91 02/19/2024     02/19/2024     No results for input(s): "TSH", "FREET4" in the last 168 hours.  Lab Results   Component Value Date    HGBA1C 10.4 (H) 01/30/2024       Nutritional status:   Body mass index is 56.58 kg/m².  Lab Results   Component Value Date    ALBUMIN 1.9 (L) 02/19/2024    ALBUMIN 1.8 (L) 02/18/2024    ALBUMIN 1.8 (L) 02/18/2024     No results found for: "PREALBUMIN"    Estimated Creatinine Clearance: 27.4 mL/min (A) (based on SCr of 3.6 mg/dL (H)).    Accu-Checks  Recent Labs     02/17/24 2013 02/18/24  0024 02/18/24  0355 02/18/24  0733 02/18/24  1138 02/18/24  1601 02/18/24  2033 02/19/24  0015 02/19/24  0333 02/19/24  0741   POCTGLUCOSE 139* 133* 142* 130* 147* 170* 142* 131* 127* 112*       Current Medications and/or Treatments Impacting Glycemic Control  Immunotherapy:    Immunosuppressants       None          Steroids:   Hormones (From admission, onward)      None          Pressors:    Autonomic Drugs (From admission, onward)      None      "     Hyperglycemia/Diabetes Medications:   Antihyperglycemics (From admission, onward)      Start     Stop Route Frequency Ordered    02/13/24 0915  insulin detemir U-100 (Levemir) pen 14 Units         -- SubQ Daily 02/13/24 0906    02/09/24 1200  insulin aspart U-100 pen 6 Units         -- SubQ Every 4 hours 02/09/24 0901    02/03/24 1010  insulin aspart U-100 pen 0-10 Units         -- SubQ Every 4 hours PRN 02/03/24 0911

## 2024-02-19 NOTE — CONSULTS
[facility]  Infectious Disease  Consult Note    Patient Name: Sarah Saravia  MRN: [id number]  Admission Date: 1/29/2024  Hospital Length of Stay: 21 days  Attending Physician: Steve Cole MD  [facility]     Isolation Status: Contact    Patient information was obtained from past medical records and ER records.      Inpatient consult to Infectious Diseases  Consult performed by: Devika Falcon MD  Consult ordered by: Chirag Borges MD        Assessment/Plan:     Neuro  Ac isch multi vasc territories stroke  Multiple acute embolic strokes noted on MRI. TTE without evidence of infective endocarditis. Blood cultures no growth. Underwent operative JAY without any reported abnormalities.     Patient now s/p diagnostic lumbar puncture on 2/16. CSF pleocytosis noted however a CSF WBC of 8 is not indicated of an infectious process. Suspect abnormal cell count is due to ischemic embolic process noted on imaging.   Unlikely that emboli as septic in nature as patient was not actively bacteremic, does not meet clinical criteria for IE based on modified Duke Criteria, and JAY was negative for vegetations and structural abnormalities of the heart valves.     Renal/  * Ayaz's gangrene  I have reviewed hospital notes from  SICU service and other specialty providers. I have also reviewed CBC, CMP/BMP,  cultures and imaging with my interpretation as documented.     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 Operative cultures showing P mirabilis. S/P wound VAC exchange on 2/6. S/p wound VAC exchange and debridement on 2/9.     Since last seen patient has had OR wound VAC exchanges. She is afebrile and without leukocytosis. Transitioned to Zosyn by SICU team.  Can continue Zosyn for now.  If not further debridements have been required then would discontinue antibiotics on 2/22/24.  Monitor CBC and CMP weekly while on antibiotics.  Discussed  management plan with the staff and/or members from SICU service.       Acute cystitis without hematuria  Fever and leukocytosis on 2/13. Samples collected for culture on 2/14 via old meza catheter with Burkholderia multivorans. Repeat urinalysis and culture after meza replacement on 2/16 with Chryseobacterium indologenes. After meza replacement she has been afebrile and without leukocytosis.     Infections with these organisms are usually related to indwelling devices such as the meza catheter. In this patient, I suspect likely colonization of the indwelling meza and/or urinary tract as fever and leukocytosis resolved without culture directed antibiotic therapy.   Can continue Zosyn for now as therapy was changed today.  Monitor for fever and leukocytosis.  Recommend limiting antibiotic therapy as possible as patient has organisms with antimicrobial resistance.           Thank you for your consult. I will follow-up with patient. Please contact us if you have any additional questions.    Devika Andujar MD  Infectious Disease  [facility] - Surgical Intensive Care    60 minutes of total time spent on the encounter, which includes face to face time and non-face to face time preparing to see the patient (eg, review of tests), obtaining and/or reviewing separately obtained history, documenting clinical information in the electronic or other health record, independently interpreting results (not separately reported) and communicating results to the patient/family/caregiver, or care coordination (not separately reported).     Subjective:     Principal Problem: Ayaz's gangrene    HPI: A 53-year-old woman with morbid obesity as evidenced by a BMI of 50 3 (documented as 58 at [facility]) who originally presented to [facility] with a wound to her posterior thigh, vomiting, and diarrhea for 3-4 days prior to admission.  On evaluation in [facility] ED she was noted to be afebrile tachycardic, and hypertensive.   Laboratory workup at that time revealed leukocytosis, elevated creatinine, elevated lactic acid, and elevated CRP.  Additionally it showed hyperglycemia with anion gap acidosis consistent with diabetic ketoacidosis.  CT imaging of the abdomen showed extensive soft tissue air throughout the right groin and inguinal region extending to the right lower quadrant of the anterior abdominal wall and medial aspect of the right thigh concerning for gas-forming infection.  She was admitted to hospital medicine service and taken to the OR by General surgery for debridement.  Empiric antibiotics with Zosyn, clindamycin, and vancomycin was started.  She underwent incision and drainage with debridement of the soft tissues down to the muscular fascia on 01/29.  She was then subsequently taken back to the OR on 01/31 where she underwent irrigation and debridement of the wound and insertion of a triple-lumen temporary hemodialysis catheter.  She was subsequently transitioned to meropenem and clindamycin while in [facility].  Unfortunately her hospital course was complicated by acute respiratory failure requiring intubation with mechanical ventilation and worsening ALVARADO prompting initiation of renal replacement therapy.  She was transferred to [facility] for higher level of care.  Upon transfer the patient's antibiotic therapy was deescalated to ceftriaxone.    Infectious disease is consulted for Antibiotic management: currently on [facility], concern for nec fasciitis    Patient seen and evaluated by Infectious Diseases. 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 Taken to OR on 2/2 for debridement of the right buttocks and groin (#3). Evaluated by Neurology on 2/3 for encephalopathy. CT head with remote infarcts. Not withdrawing to pain on the morning of 2/5.     MRI done on 2/6 with embolic infarcts. S/P wound VAC exchange on 2/6. MRI brain revealed multiple embolic infarcts. Wound  "VAC exchange with debridement of fibrinous tissue, wound approximation and wound VAC placement on 2/9. Intraoperative JAY on 2/9 without cardiac abnormalities per EMR review. Recommendations for at least 2 weeks of antibiotic therapy from debridement on 2/9 given. Long term antibiotic therapy for concern for septic embolization not recommended due to negative JAY, Modified Berg criteria not meeting definition for probable or possible endocarditis, and negative blood cultures decision.    Unfortunately, the patients hospital course was further complicated by fever and leukocytosis on Hospital Day 16 and 18 prompting primary service for performed urine studies. 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      Infectious Diseases consulted for "Positive UA with uncommon bacteria"              History reviewed. No pertinent past medical history.    Past Surgical History:   Procedure Laterality Date    INCISION AND DRAINAGE OF PERIRECTAL REGION N/A 1/29/2024    Procedure: INCISION AND DRAINAGE, PERIRECTAL REGION;  Surgeon: Axel Ramsay MD;  Location: [facility] OR;  Service: General;  Laterality: N/A;    LUMBAR PUNCTURE N/A 2/16/2024    Procedure: Lumbar Puncture;  Surgeon: Macy Boykin;  Location: [facility];  Service: Anesthesiology;  Laterality: N/A;    PLACEMENT, TRIALYSIS CATH Right 1/31/2024    Procedure: INSERTION, CATHETER, TRIPLE LUMEN, HEMODIALYSIS, TEMPORARY;  Surgeon: Alvin Junior MD;  Location: [facility] OR;  Service: General;  Laterality: Right;    REPLACEMENT OF WOUND VACUUM-ASSISTED CLOSURE DEVICE Right 2/12/2024    Procedure: REPLACEMENT, WOUND VAC;  Surgeon: Mundo Carmona MD;  Location: " [facility] OR 2ND FLR;  Service: General;  Laterality: Right;    REPLACEMENT OF WOUND VACUUM-ASSISTED CLOSURE DEVICE N/A 2/15/2024    Procedure: REPLACEMENT, WOUND VAC;  Surgeon: Steve Cole MD;  Location: [facility] OR [facility] FLR;  Service: General;  Laterality: N/A;    WOUND DEBRIDEMENT Bilateral 2/2/2024    Procedure: DEBRIDEMENT, WOUND;  Surgeon: Steve Cole MD;  [facility];  Service: General;  Laterality: Bilateral;  Bilateral groin  Possible wound vac placement    WOUND DEBRIDEMENT Right 2/6/2024    Procedure: DEBRIDEMENT, WOUND, replace wound vac, possible closure;  Surgeon: Steve Cole MD;  [facility];  Service: General;  Laterality: Right;  RLE    WOUND DEBRIDEMENT Right 2/9/2024    Procedure: DEBRIDEMENT, WOUND w wound vac change;  Surgeon: Steve Cole MD;  [facility];  Service: General;  Laterality: Right;  RLE    WOUND DEBRIDEMENT Right 2/12/2024    Procedure: R thigh wound debridement;  Surgeon: Mundo Carmona MD;  [facility];  Service: General;  Laterality: Right;    WOUND EXPLORATION Right 1/31/2024    Procedure: IRRIGATION & DEBRIDEMENT, WOUND DEBRIDEMENT;  Surgeon: Alvin Junoir MD;  Location: [facility];  Service: General;  Laterality: Right;       Review of patient's allergies indicates:  No Known Allergies    Medications:  No medications prior to admission.     Antibiotics (From admission, onward)      Start     Stop Route Frequency Ordered    02/19/24 0900  piperacillin-tazobactam (ZOSYN) 4.5 g in dextrose 5 % in water (D5W) 100 mL IVPB (MB+)         -- IV Every 8 hours (non-standard times) 02/19/24 0811          Antifungals (From admission, onward)      None          Antivirals (From admission, onward)      None             Immunization History   Administered Date(s) Administered    COVID-19, MRNA, LN-S, PF (Pfizer) (Purple Cap) 05/24/2021, 06/14/2021    PPD Test 08/16/2022       Family History    None       Social History      Socioeconomic History    Marital status: Single     Social Determinants of Health     Financial Resource Strain: Patient Unable To Answer (1/31/2024)    Overall Financial Resource Strain (CARDIA)     Difficulty of Paying Living Expenses: Patient unable to answer   Food Insecurity: Patient Declined (1/31/2024)    Hunger Vital Sign     Worried About Running Out of Food in the Last Year: Patient declined     Ran Out of Food in the Last Year: Patient declined   Transportation Needs: Patient Unable To Answer (1/31/2024)    PRAPARE - Transportation     Lack of Transportation (Medical): Patient unable to answer     Lack of Transportation (Non-Medical): Patient unable to answer   Stress: Patient Unable To Answer (1/31/2024)    [employer] - Occupational Stress Questionnaire     Feeling of Stress : Patient unable to answer   Housing Stability: Patient Unable To Answer (1/31/2024)    Housing Stability Vital Sign     Unable to Pay for Housing in the Last Year: Patient unable to answer     Unstable Housing in the Last Year: Patient unable to answer     Review of Systems   Unable to perform ROS: Intubated     Objective:     Vital Signs (Most Recent):  Temp: 99.1 °F (37.3 °C) (02/19/24 1500)  Pulse: 80 (02/19/24 1531)  Resp: 14 (02/19/24 1531)  BP: 119/62 (02/19/24 1500)  SpO2: 100 % (02/19/24 1531) Vital Signs (24h Range):  Temp:  [98.3 °F (36.8 °C)-99.2 °F (37.3 °C)] 99.1 °F (37.3 °C)  Pulse:  [77-95] 80  Resp:  [12-39] 14  SpO2:  [99 %-100 %] 100 %  BP: (112-162)/(56-89) 119/62     Weight: (!) 154.2 kg (340 lb)  Body mass index is 56.58 kg/m².    Estimated Creatinine Clearance: 27.4 mL/min (A) (based on SCr of 3.6 mg/dL (H)).     Physical Exam  Vitals and nursing note reviewed.   Constitutional:       Appearance: She is well-developed. She is ill-appearing.      Interventions: She is intubated.   HENT:      Head: Normocephalic and atraumatic.      Right Ear: External ear normal.      Left Ear:  External ear normal.   Eyes:      General: No scleral icterus.        Right eye: No discharge.         Left eye: No discharge.      Conjunctiva/sclera: Conjunctivae normal.   Cardiovascular:      Rate and Rhythm: Normal rate and regular rhythm.      Heart sounds: Normal heart sounds. No murmur heard.     No friction rub. No gallop.   Pulmonary:      Effort: Pulmonary effort is normal. No respiratory distress. She is intubated.      Breath sounds: No stridor. No wheezing, rhonchi or rales.   Abdominal:      General: Bowel sounds are normal.   Musculoskeletal:         General: No tenderness.   Skin:     General: Skin is warm and dry.      Comments: Wound VAC on right groin area   Neurological:      Mental Status: She is lethargic.      Comments: Does not respond to verbal, tactile or painful stimuli.           Significant Labs: BMP:   Recent Labs   Lab 02/19/24  0159 02/19/24  1311   * 117*   * 137   K 4.6 4.6    104   CO2 23 23   BUN 49* 52*   CREATININE 3.6* 3.6*   CALCIUM 8.6* 9.0   MG 1.8  --      CBC:   Recent Labs   Lab 02/18/24  0338 02/19/24  0159   WBC 10.13 9.22   HGB 7.4* 7.5*   HCT 23.9* 24.4*    379     Microbiology Results (last 7 days)       Procedure Component Value Units Date/Time    AFB Culture & Smear [[id number]] Collected: 02/19/24 0820    Order Status: Sent Specimen: CSF (Spinal Fluid) from Cerebrospinal fluid (CSF) Updated: 02/19/24 1525    Culture, Anaerobe [[id number]] Collected: 02/19/24 0820    Order Status: Sent Specimen: CSF (Spinal Fluid) from Cerebrospinal fluid (CSF) Updated: 02/19/24 1524    Fungus culture [[id number]] Collected: 02/19/24 0820    Order Status: Sent Specimen: CSF (Spinal Fluid) from Cerebrospinal fluid (CSF) Updated: 02/19/24 1524    Aerobic culture [[id number]] Collected: 02/19/24 0820    Order Status: Sent Specimen: CSF (Spinal Fluid) from Cerebrospinal fluid (CSF) Updated: 02/19/24 1524    CSF culture [[id number]] Collected: 02/19/24 0820     Order Status: Sent Specimen: CSF (Spinal Fluid) from Cerebrospinal fluid (CSF) Updated: 02/19/24 1523    Gram stain [[id number]] Collected: 02/19/24 0820    Order Status: Canceled Specimen: CSF (Spinal Fluid) from Cerebrospinal fluid (CSF)     Urine culture [[id number]]  (Abnormal)  (Susceptibility) Collected: 02/16/24 1214    Order Status: Completed Specimen: Urine Updated: 02/18/24 1113     Urine Culture, Routine CHRYSEOBACTERIUM INDOLOGENES  > 100,000 cfu/ml      Narrative:      Specimen Source->Urine    Urine culture [[id number]]  (Abnormal)  (Susceptibility) Collected: 02/14/24 1249    Order Status: Completed Specimen: Urine Updated: 02/17/24 1411     Urine Culture, Routine BURKHOLDERIA SPECIES  >100,000 cfu/ml  Further identified as B. multivorans      Narrative:      Indicated criteria for high risk culture:->Other  Other (specify):->fever    Urine culture [[id number]] Collected: 02/14/24 1249    Order Status: Completed Specimen: Urine Updated: 02/15/24 2205     Urine Culture, Routine Multiple organisms isolated. None in predominance.  Repeat if      clinically necessary.    Narrative:      Specimen Source->Urine    Culture, Anaerobe [[id number]]  (Abnormal) Collected: 01/30/24 0228    Order Status: Completed Specimen: Wound from Groin Updated: 02/14/24 1139     Anaerobic Culture BACTEROIDES SPECIES  Few      Narrative:      Right groin tissue    Blood culture [[id number]]     Order Status: Canceled Specimen: Blood           Urine Studies:   Recent Labs   Lab 01/30/24  0415 02/14/24  1249 02/16/24  1214   COLORU Orange*   < > Yellow   APPEARANCEUA Cloudy*   < > Ex.Turbid   PHUR 6.0   < > 5.0   SPECGRAV 1.020   < > 1.010   PROTEINUA 2+*   < > 1+*   GLUCUA 4+*   < > Negative   KETONESU Trace*   < > Negative   BILIRUBINUA Negative   < > Negative   OCCULTUA 3+*   < > 2+*   NITRITE Negative   < > Positive*   UROBILINOGEN Negative  --   --    LEUKOCYTESUR Negative   < > 3+*   RBCUA 74*   < > 21*   WBCUA 2   < >  >100*   BACTERIA Occasional   < > Many*   SQUAMEPITHEL 2   < > 4   HYALINECASTS 0   < > 7*    < > = values in this interval not displayed.       Significant Imaging: I have reviewed all pertinent imaging results/findings within the past 24 hours.

## 2024-02-19 NOTE — PROGRESS NOTES
[facility]  General Surgery  Progress Note    Subjective:     History of Present Illness:  53 year old morbidly obese female who does not routinely go to the doctor presents to the ED with malaise, nausea, vomiting, and groin, buttock, perineal swelling and tenderness. She began feeling ill a few days ago.     On arrival to the ED she is tachycardic to 120, hypertensive without fever. Labs demonstrate leukocytosis of 21, , with lactic acidosis and associated ALVARADO with cr 3.8, hyperglycemia with blood glucose of 824 accompanied by severe electrolyte derangements. CT imaging obtained demonstrates diffuse subcutaneous air along buttocks, perineum, anterior vulva, as well as bilateral thighs.     No prior abdominal surgeries. She denies problems with her heart or lungs. Non smoker. Does not routinely take any medications.    Post-Op Info:  Procedure(s) (LRB):  Lumbar Puncture (N/A)   3 Days Post-Op     Interval History: NAEO. VSS. AF. OR today for wound vac change.     Medications:  Continuous Infusions:   dextrose 10 % in water (D10W)       Scheduled Meds:   sodium chloride 0.9%   Intravenous Once    amLODIPine  10 mg Per OG tube Daily    carvediloL  25 mg Per OG tube BID    cefTRIAXone (Rocephin) IV (PEDS and ADULTS)  2 g Intravenous Daily    famotidine  20 mg Per OG tube Daily    furosemide (LASIX) injection  120 mg Intravenous TID    heparin (porcine)  7,500 Units Subcutaneous Q8H    insulin aspart U-100  6 Units Subcutaneous Q4H    insulin detemir U-100  14 Units Subcutaneous Daily    metronidazole  500 mg Intravenous Q8H    psyllium husk (aspartame)  1 packet Per OG tube BID    sevelamer carbonate  1.6 g Per OG tube TID WM     PRN Meds:0.9%  NaCl infusion (for blood administration), sodium chloride 0.9%, acetaminophen, albuterol-ipratropium, dextrose 10 % in water (D10W), dextrose 10%, dextrose 10%, glucagon (human recombinant), glucose, glucose, hydrALAZINE, insulin aspart U-100,  labetalol, naloxone, ondansetron, oxyCODONE, prochlorperazine, sodium chloride 0.9%, sodium chloride 0.9%, sodium chloride 0.9%     Review of patient's allergies indicates:  No Known Allergies  Objective:     Vital Signs (Most Recent):  Temp: 99.2 °F (37.3 °C) (02/19/24 0700)  Pulse: 79 (02/19/24 0730)  Resp: 16 (02/19/24 0730)  BP: (!) 141/69 (02/19/24 0700)  SpO2: 100 % (02/19/24 0730) Vital Signs (24h Range):  Temp:  [98.4 °F (36.9 °C)-99.6 °F (37.6 °C)] 99.2 °F (37.3 °C)  Pulse:  [79-95] 79  Resp:  [15-39] 16  SpO2:  [99 %-100 %] 100 %  BP: (112-162)/(56-89) 141/69     Weight: (!) 154.2 kg (340 lb)  Body mass index is 56.58 kg/m².    Intake/Output - Last 3 Shifts         02/17 0700  02/18 0659 02/18 0700 02/19 0659 02/19 0700 02/20 0659    I.V. (mL/kg)  12.6 (0.1)     Blood       NG/ 735     IV Piggyback 395 298.6     Total Intake(mL/kg) 1090 (6.9) 1046.2 (6.8)     Urine (mL/kg/hr) 1155 (0.3) 2265 (0.6) 55 (0.5)    Drains  150 100    Other 250 325     Stool  0     Total Output 1405 2740 155    Net -315 -1693.8 -155           Stool Occurrence  1 x              Physical Exam  Vitals and nursing note reviewed.   Constitutional:       General: She is not in acute distress.     Appearance: She is ill-appearing.   HENT:      Head: Normocephalic and atraumatic.   Eyes:      Extraocular Movements: Extraocular movements intact.      Conjunctiva/sclera: Conjunctivae normal.   Neck:      Comments: Left IJ Trialysis catheter  Cardiovascular:      Rate and Rhythm: Normal rate and regular rhythm.   Pulmonary:      Effort: Pulmonary effort is normal.      Comments: Intubated, on spontaneous    Abdominal:      General: There is no distension.      Palpations: Abdomen is soft.      Tenderness: There is no abdominal tenderness.   Genitourinary:     Comments: Glynn in place    Skin:     General: Skin is warm and dry.      Comments: Wound vac in place to R thigh/groin to suction   Neurological:      General: No focal  deficit present.      Mental Status: She is oriented to person, place, and time.   Psychiatric:         Mood and Affect: Mood normal.         Behavior: Behavior normal.          Significant Labs:  I have reviewed all pertinent lab results within the past 24 hours.  CBC:   Recent Labs   Lab 02/19/24  0159   WBC 9.22   RBC 2.69*   HGB 7.5*   HCT 24.4*      MCV 91   MCH 27.9   MCHC 30.7*     BMP:   Recent Labs   Lab 02/19/24  0159   *   *   K 4.6      CO2 23   BUN 49*   CREATININE 3.6*   CALCIUM 8.6*   MG 1.8       Significant Diagnostics:  I have reviewed all pertinent imaging results/findings within the past 24 hours.  Assessment/Plan:     * Ayaz's gangrene  53 y.o. 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     - OR today for wound vac change, TF held since MN   - Last [place] change 2/15  - Will discuss plans for trach/PEG/permacath/stoma in the setting of fevers. No fevers in 24 hours. Likely Thursday pending family discussions  - LP 2/16 - Specimen for cultures is missing.   - ID consulted for urine cultures - recommend broadening to zosyn    - UA growing Burkholderia species and Chryseibacterium Indologenes   - Palliative following given overall poor prognosis, daughter would like everything done;  - Stable on spontaneous   - Okay for DVT ppx   - Remainder of care per SICU        Celia Allison MD  General Surgery  [facility]

## 2024-02-19 NOTE — PROGRESS NOTES
"[facility]  Adult Nutrition  Progress Note    SUMMARY       Recommendations     1.) TF recs: continue with Peptamen VHP @50ml/hr to provide 1200 kcal, 110g PRO, and 1008ml FF- FWF per MD.     - if pt still undergoing CRRT, may increase Peptamen VHP to 60ml/hr to provide 1440 kcal, 132g PRO, and 1210ml FF- FWF per MD.     2.) If renal labs continue to worsen, consider formula change, consult RD.     3.) Monitor for s/s of intolerance such as residuals >500ml, n/v/d, or abdominal distension.      4.) RD to monitor tolerance, skin, labs, wt.     Goals: Meet % EEN/EPN by follow-up date.  Nutrition Goal Status: progressing towards goal  Communication of RD Recs:  (POC)    Assessment and Plan    Nutrition Problem  Inadequate oral intake     Related to (etiology):   Diagnosis related symptoms     Signs and Symptoms (as evidenced by):   Intubated  NPO     Interventions/Recommendations (treatment strategy):  Collaboration with medical providers     Nutrition Diagnosis Status:   Continues    Reason for Assessment    Reason For Assessment: RD follow-up  Diagnosis:  (ros's gangrene)  Relevant Medical History: obesity, T2DM, CKD  Interdisciplinary Rounds: did not attend    General Information Comments: RD f/u: pt seen briefly. Pt resting comfortably, on vent. TF were not running: formula changed to VHP, RN stated that they were awaiting new formula to be delivered. Noted that renal labs are worsening. Per neph, RRT was to be withheld for the day. Mild edema present. RD team to continue to monitor.    Nutrition Discharge Planning: pending medical course    Nutrition Risk Screen    Nutrition Risk Screen: tube feeding or parenteral nutrition    Nutrition/Diet History    Food Allergies: NKFA    Anthropometrics    Temp: 99.1 °F (37.3 °C)  Height Method: Stated  Height: 5' 5" (165.1 cm)  Height (inches): 65 in  Weight Method: Bed Scale  Weight: (!) 154.2 kg (340 lb)  Weight (lb): (!) 340 lb  Ideal " Body Weight (IBW), Female: 125 lb  % Ideal Body Weight, Female (lb): 285.6 %  BMI (Calculated): 56.6  BMI Grade: greater than 40 - morbid obesity    Lab/Procedures/Meds    Pertinent Labs Reviewed: reviewed  Pertinent Labs Comments: BUN: 52, cr: 3.6, GFR: 14.5, gluc: 117, phos: 6.5, alb: 1.9    Pertinent Medications Reviewed: reviewed  Pertinent Medications Comments: Abx, lasix, heparin, insulin, psyllium husk, sevelamer    Estimated/Assessed Needs    Weight Used For Calorie Calculations: 57 kg (125 lb 10.6 oz) (IBW d/t BMI >50.0)  Energy Calorie Requirements (kcal): 1254- 1425 kcal  Energy Need Method: Kcal/kg (22-25 kcal/kg of IBW)    Protein Requirements: 114- 143g (2.0-2.5g/kg)  Weight Used For Protein Calculations: 57 kg (125 lb 10.6 oz) (IBW d/t BMI >50.0)    Estimated Fluid Requirement Method: RDA Method  RDA Method (mL): 1254    Nutrition Prescription Ordered    Current Diet Order: NPO  Current Nutrition Support Formula Ordered: Peptamen Intense VHP  Current Nutrition Support Rate Ordered: 50 (ml)    Evaluation of Received Nutrient/Fluid Intake    Enteral Calories (kcal):  (-)  Enteral Protein (gm):  (-)  Enteral (Free Water) Fluid (mL):  (-)  I/O: -5.4L since 2/12  Energy Calories Required: not meeting needs  Protein Required: not meeting needs  Fluid Required:  (as per MD)  Comments: LBM 2/19  Tolerance: tolerating  % Intake of Estimated Energy Needs: 0 - 25 %  % Meal Intake: NPO    Nutrition Risk    Level of Risk/Frequency of Follow-up:  (RD to f/u x 1-2/week)     Monitor and Evaluation    Food and Nutrient Intake: enteral nutrition intake, parenteral nutrition intake  Food and Nutrient Adminstration: enteral and parenteral nutrition administration  Knowledge/Beliefs/Attitudes: food and nutrition knowledge/skill, beliefs and attitudes  Physical Activity and Function: nutrition-related ADLs and IADLs, factors affecting access to physical activity  Anthropometric Measurements: weight, weight change, body  mass index  Biochemical Data, Medical Tests and Procedures: electrolyte and renal panel  Nutrition-Focused Physical Findings: overall appearance     Nutrition Follow-Up    RD Follow-up?: Yes

## 2024-02-19 NOTE — CONSULTS
[facility]  Palliative Medicine  Progress Note    Patient Name: Sarah Saravia  MRN: [id number]  Admission Date: 1/29/2024  Hospital Length of Stay: 21 days  Code Status: Full Code   Attending Provider: Steve Cole MD  Consulting Provider: Estee Guzman MD  [facility]  Principal Problem:Ayaz's gangrene    Patient information was obtained from relative(s) and primary team.      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 Palliative and Supportive Care was consulted to explore goals of care.    Advance Care Planning  Goals of Care  - Code status: Full code  - Next of kin: two adult children   - Daughter [facility] [phone]   - Son is in care home  - ACP documents: none  - Patient does not have decision making capacity  - Prognosis: guarded  - Goals: life prolongation  - Plans: continue full supportive care, will follow along for family support during this very vulnerable time as prognosis is clarified    Goals of Care Conversation:  - 2/7/24: I met Ms. Saravia, intubated, not on sedation but not responsive or following commands, on spontaneous breathing trial. No family at bedside. I called her daughter [facility], who shared her understanding that her mother was newly found to have many mini-strokes in her brain, but believed that the doctors were looking for the source of the clot. We reviewed her mother's severe  medical illnesses, including Ayaz's gangrene for which she received several debridements/wound vac exchanges, multiple IV antibiotics which she is still on, severe hyperglycemia, dependence on ventilator to protect her airway in setting of severe sepsis, acute renal failure now dependent on dialysis machine and encephalopathy for which the doctors discovered multiple mini-strokes. I shared that I learned that the team engaged the stroke physicians, who expressed concern that the pattern of stroke in the setting of severe infection might be related to each other, and are searching for possible sources of infection hiding elsewhere in her body, including her heart. Aleks asked if possibly the sponge inside her mother's wound was the source of infection, and I shared that knowing that that the surgeons are still doing wound vac exchanges, that likely it isn't because they are treating her proactively to not only wipe away any infection that they can, but to also proactively treat her to minimize her risk of worsening infection. I admitted that I'm not a surgeon and cannot explain this as helpful as I wish I could. Aleks shared that thankfully the doctor on the surgical team has been communicating in understandable terms so feels assured that she is being informed thoroughly and trusts that she can ask these questions again in the future. She was appropriately tearful as I explained concern that her mother is critically ill and that her surgical team is aggressively treating her and thoroughly looking for any opportunities to help her get better. Aleks expressed gratefulness for this.  - I asked Aleks to share with me more about her mother. 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 She lives with her long-time partner of 26-28 years. Aleks is 31-years-old so recognized this man as her step-father and consider each other as family, though Ms. Saravia and  her partner are not legally . Ms. Saravia also has a son, but he is in assisted. She has five grandchildren (through son), enjoys watching movies, going out to eat, and shopping all day. This sudden illness has been a huge shock and change in her life, and are hoping to help her return to her original life. Validated how terrifying and overwhelming this is, and reassured her that the doctors are working very aggressively in hopes to restore as much of her mother's health. Did warn that while the doctors promise to be thoughtful, compassionate and thorough, it doesn't mean we can promise the results we pray and hope for. Aleks expressed understanding. Will continue to follow along for support as Ms. Saravia's trajectory unfolds.  - 2/12/24: Met Ms. Saravia and her long-time boyfriend Mr. Gallegos at bedside. Supportive conversation held which Ms. Saravia could not participate due to persistent encephalopathy. Mr. Gallegos expresses deep thanks to the SICU team for doing everything they can to find out why she is persistently encephalopathic. He shared how shocking it was for the sudden decline after her first operation. He shares that Ms. Saravia has always been a very active woman, echoing Aleks who had shared with me earlier about her love for shopping, eating at restaurants and going to the movies. He admits that they've never discussed end of life or preferences related to artificial life support. 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 He thinks that as an active woman, a life committed to the bed would be devastating. However, today he saw her open her eyes to his voice and move her foot. This is an improvement in her neuro status and he is hoping that this is a sign that there is more improvement in the future. Agreed that it's so important to hold hope for a meaningful recovery and also staying mindful that  we respect Ms. Saravia's dignity and quality of life by not committing her to a life that she would not find acceptable. He reiterated family's deep desire to exhaust all opportunities to help her improve. Reassured him that her team is absolutely doing this and also staying mindful/transparent about their own worries about our limitations as human beings as well. He tells me that he spends the nights with Ms. Saravia, leaves around 11 AM to return home to wash up/take care of responsibilities. I shared I will return later this week in the morning to make sure I can check in again during the hours he is in the hospital.  - 2/15/24: Remains encephalopathic despite no sedation, doing well on SBT. Returned from OR for wound vac exchange and LP attempted but will be reattempted later due to needle not being long enough to receive CSF fluid. Spoke with daughter on the phone who was very aware of the above, and she shared her conversation about trach/PEG with the surgery team earlier. She shared her understanding that these were reversible interventions in the future when Ms. Saravia recovered. She shared that expectations are for her mother to make a meaningful recovery. I asked what if Ms. Saravia did not improve, and she had to live the rest of her life dependent on others, on machines with trach/PEG. I asked her what would Ms. Saravia say in response to that, and Aleks admits that she doesn't know. There was quite a bit of silence during this conversation. Acknowledged how difficult this is, but that life after trach/PEG would involve likely nursing homes for SNF, and likely trajectory of back and forth from SNF to hospital. Aleks remains hopeful that her mother will make a meaningful recovery and return home. Ms. Saravia's friends are in a very similar field of health care, and would help out at home as well. Would benefit from counseling regarding prognosis and unlikeliness to recover mental status back to baseline or  inability to restore independence and prior quality of life. Otherwise, family is expecting meaningful recovery which is why they would like to proceed with full supportive care.  - 2/19/24: Spoke with Aleks via telephone. She had actually just left the hospital. She told me that she was able to ask her mom to move her hand and she did. She is so grateful she had a witness see this because everyone else has been saying that she doesn't make meaningful movement, but today she followed her daughter's requests/commands. Because she is seeing improvement, she'd like to continue interventions that may buy her more time, including PEG/trach. She is immensely grateful for the communication from the surgeons who keep her informed and also grateful for our conversations and check ins during this difficult time. Will continue to follow along.             I will follow-up with patient. Please contact us if you have any additional questions.    Subjective:     Chief Complaint:   Chief Complaint   Patient presents with    Vomiting     Vomiting and diarrhea for 3-4 days.   Wound to posterior right thigh.        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 Palliative and Supportive Care was consulted to explore goals of care.    Hospital Course:  No notes on file    Interval History: Intubated, not sedated, opening left eye spontaneously but not tracking or making meaningful eye contact. Does not squeeze hand to command.    Past Medical History:  T2DM  CKD3a    Past Surgical History:   Procedure Laterality Date     INCISION AND DRAINAGE OF PERIRECTAL REGION N/A 1/29/2024    Procedure: INCISION AND DRAINAGE, PERIRECTAL REGION;  Surgeon: Axel Ramsay MD;  Location: [facility] OR;  Service: General;  Laterality: N/A;    LUMBAR PUNCTURE N/A 2/16/2024    Procedure: Lumbar Puncture;  Surgeon: Macy Boykin;  Location: [facility];  Service: Anesthesiology;  Laterality: N/A;    PLACEMENT, TRIALYSIS CATH Right 1/31/2024    Procedure: INSERTION, CATHETER, TRIPLE LUMEN, HEMODIALYSIS, TEMPORARY;  Surgeon: Alvin Junior MD;  Location: [facility] OR;  Service: General;  Laterality: Right;    REPLACEMENT OF WOUND VACUUM-ASSISTED CLOSURE DEVICE Right 2/12/2024    Procedure: REPLACEMENT, WOUND VAC;  Surgeon: Mundo Carmona MD;  [facility];  Service: General;  Laterality: Right;    REPLACEMENT OF WOUND VACUUM-ASSISTED CLOSURE DEVICE N/A 2/15/2024    Procedure: REPLACEMENT, WOUND VAC;  Surgeon: Steve Cole MD;  [facility];  Service: General;  Laterality: N/A;    WOUND DEBRIDEMENT Bilateral 2/2/2024    Procedure: DEBRIDEMENT, WOUND;  Surgeon: Steve Cole MD;  Location: [facility] OR [address] [place] [address];  Service: General;  Laterality: Bilateral;  Bilateral groin  Possible wound vac placement    WOUND DEBRIDEMENT Right 2/6/2024    Procedure: DEBRIDEMENT, WOUND, replace wound vac, possible closure;  Surgeon: Steve Cole MD;  [facility];  Service: General;  Laterality: Right;  RLE    WOUND DEBRIDEMENT Right 2/9/2024    Procedure: DEBRIDEMENT, WOUND w wound vac change;  Surgeon: Steve Cole MD;  [facility];  Service: General;  Laterality: Right;  RLE    WOUND DEBRIDEMENT Right 2/12/2024    Procedure: R thigh wound debridement;  Surgeon: Mundo Carmona MD;  [facility];  Service: General;  Laterality: Right;    WOUND EXPLORATION Right 1/31/2024    Procedure: IRRIGATION & DEBRIDEMENT, WOUND DEBRIDEMENT;  Surgeon: Alvin Junior MD;  Location: [facility] OR;  Service: General;   Laterality: Right;       Review of patient's allergies indicates:  No Known Allergies    Medications:  Continuous Infusions:   dextrose 10 % in water (D10W)       Scheduled Meds:   sodium chloride 0.9%   Intravenous Once    amLODIPine  10 mg Per OG tube Daily    carvediloL  25 mg Per OG tube BID    furosemide (LASIX) injection  120 mg Intravenous TID    heparin (porcine)  7,500 Units Subcutaneous Q8H    insulin aspart U-100  6 Units Subcutaneous Q4H    insulin detemir U-100  14 Units Subcutaneous Daily    piperacillin-tazobactam (Zosyn) IV (PEDS and ADULTS) (extended infusion is not appropriate)  4.5 g Intravenous Q8H    psyllium husk (aspartame)  1 packet Per OG tube BID    sevelamer carbonate  1.6 g Per OG tube TID WM     PRN Meds:0.9%  NaCl infusion (for blood administration), sodium chloride 0.9%, acetaminophen, albuterol-ipratropium, dextrose 10 % in water (D10W), dextrose 10%, dextrose 10%, glucagon (human recombinant), glucose, glucose, hydrALAZINE, insulin aspart U-100, labetalol, naloxone, ondansetron, oxyCODONE, prochlorperazine, sodium chloride 0.9%, sodium chloride 0.9%, sodium chloride 0.9%    Family History    None       Tobacco Use    Smoking status: Not on file    Smokeless tobacco: Not on file   Substance and Sexual Activity    Alcohol use: Not on file    Drug use: Not on file    Sexual activity: Not on file       Review of Systems   Unable to perform ROS: Mental status change     Objective:     Vital Signs (Most Recent):  Temp: 99.2 °F (37.3 °C) (02/19/24 0700)  Pulse: 91 (02/19/24 0900)  Resp: 18 (02/19/24 0900)  BP: (!) 147/71 (02/19/24 0900)  SpO2: 100 % (02/19/24 0900) Vital Signs (24h Range):  Temp:  [98.4 °F (36.9 °C)-99.6 °F (37.6 °C)] 99.2 °F (37.3 °C)  Pulse:  [79-95] 91  Resp:  [15-39] 18  SpO2:  [99 %-100 %] 100 %  BP: (112-162)/(56-89) 147/71     Weight: (!) 154.2 kg (340 lb)  Body mass index is 56.58 kg/m².       Physical Exam  Constitutional:       Appearance: She is obese.   HENT:       Head: Normocephalic and atraumatic.      Right Ear: External ear normal.      Left Ear: External ear normal.      Nose: Nose normal.      Mouth/Throat:      Mouth: Mucous membranes are dry.   Eyes:      Conjunctiva/sclera: Conjunctivae normal.   Cardiovascular:      Rate and Rhythm: Normal rate.   Pulmonary:      Breath sounds: Normal breath sounds.      Comments: On spontaneous breathing trial, ETT connected to ventilator  Abdominal:      General: Bowel sounds are normal.      Palpations: Abdomen is soft.   Musculoskeletal:         General: Swelling present.   Lymphadenopathy:      Cervical: No cervical adenopathy.   Skin:     General: Skin is warm and dry.   Neurological:      Comments: Not following commands despite no sedation            Review of Symptoms      Symptom Assessment (ESAS 0-10 Scale)  Pain:  0  Dyspnea:  0  Anxiety:  0  Nausea:  0  Depression:  0  Anorexia:  0  Fatigue:  0  Insomnia:  0  Restlessness:  0  Agitation:  0 due to Mental status change         Pain Assessment in Advanced Demential Scale (PAINAD)   Breathing - Independent of vocalization:  0  Negative vocalization:  0  Facial expression:  0  Body language:  0  Consolability:  0  Total:  0    Living Arrangements:  Lives with spouse    Psychosocial/Cultural:   See Palliative Psychosocial Note: No  Lives with long-time partner - not legally , together for 26 to 28 years. Has adult daughter (age 31) and adult son, who is in long-term. Has five grandchildren. Enjoyed watching movies, going out to eat, shopping. Worked as a patient caregiver for blind patients.  **Primary  to Follow**  Palliative Care  Consult: No    Spiritual:  F - Belen and Belief:  Believes in God  I - Importance:  Important  C - Community:  Does not attend [employer]  A - Address in Care:  Engage  support        Advance Care Planning  Advance Directives:   Living Will: No    LaPOST: No    Do Not Resuscitate Status: No    Medical Power of  : No      Decision Making:  Family answered questions  Goals of Care: What is most important right now is to focus on curative/life-prolongation (regardless of treatment burdens). Accordingly, we have decided that the best plan to meet the patient's goals includes continuing with treatment.         Significant Labs: CBC:   Recent Labs   Lab 02/18/24  0338 02/19/24  0159   WBC 10.13 9.22   HGB 7.4* 7.5*   HCT 23.9* 24.4*    379       CMP:   Recent Labs   Lab 02/18/24  0338 02/18/24  1453 02/18/24  2204 02/19/24  0159    137 135*  135* 135*   K 4.5 4.7 4.5  4.5 4.6    105 103  103 103   CO2 21* 22* 22*  22* 23   * 144* 128*  128* 112*   BUN 44* 47* 46*  46* 49*   CREATININE 3.5* 3.6* 3.9*  3.9* 3.6*   CALCIUM 8.5* 8.5* 8.8  8.8 8.6*   PROT 7.0  --   --  7.2   ALBUMIN 1.8* 1.8* 1.8*  1.8* 1.9*   BILITOT 0.2  --   --  0.2   ALKPHOS 125  --   --  123   AST 22  --   --  30   ALT 10  --   --  11   ANIONGAP 10 10 10  10 9       CBC:   Recent Labs   Lab 02/19/24  0159   WBC 9.22   HGB 7.5*   HCT 24.4*   MCV 91          BMP:  Recent Labs   Lab 02/19/24  0159   *   *   K 4.6      CO2 23   BUN 49*   CREATININE 3.6*   CALCIUM 8.6*   MG 1.8       LFT:  Lab Results   Component Value Date    AST 30 02/19/2024    ALKPHOS 123 02/19/2024    BILITOT 0.2 02/19/2024     Albumin:   Albumin   Date Value Ref Range Status   02/19/2024 1.9 (L) 3.5 - 5.2 g/dL Final     Protein:   Total Protein   Date Value Ref Range Status   02/19/2024 7.2 6.0 - 8.4 g/dL Final     Lactic acid:   Lab Results   Component Value Date    LACTATE 1.5 02/01/2024    LACTATE 2.4 (H) 01/31/2024       Significant Imaging: I have reviewed all pertinent imaging results/findings within the past 24 hours.    I spent a total of 50 minutes on the day of the visit. This includes face to face time in discussion of goals of care, symptom assessment, coordination of care and emotional support. This also includes  non-face to face time preparing to see the patient (eg, review of tests/imaging), obtaining and/or reviewing separately obtained history, documenting clinical information in the electronic or other health record, independently interpreting results and communicating results to the patient/family/caregiver, or care coordinator.       Estee Guzman MD  Palliative Medicine  [facility] - Surgical Intensive Care

## 2024-02-19 NOTE — PLAN OF CARE
Recommendations      1.) TF recs: continue with Peptamen VHP @50ml/hr to provide 1200 kcal, 110g PRO, and 1008ml FF- FWF per MD.                 - if pt still undergoing CRRT, may increase Peptamen VHP to 60ml/hr to provide 1440 kcal, 132g PRO, and 1210ml FF- FWF per MD.      2.) If renal labs continue to worsen, consider formula change, consult RD.      3.) Monitor for s/s of intolerance such as residuals >500ml, n/v/d, or abdominal distension.       4.) RD to monitor tolerance, skin, labs, wt.      Goals: Meet % EEN/EPN by follow-up date.  Nutrition Goal Status: progressing towards goal  Communication of RD Recs:  (POC)

## 2024-02-19 NOTE — ASSESSMENT & PLAN NOTE
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 Palliative and Supportive Care was consulted to explore goals of care.    Advance Care Planning   Goals of Care  - Code status: Full code  - Next of kin: two adult children   - Daughter [facility] [phone]   - Son is in skilled nursing  - ACP documents: none  - Patient does not have decision making capacity  - Prognosis: guarded  - Goals: life prolongation  - Plans: continue full supportive care, will follow along for family support during this very vulnerable time as prognosis is clarified    Goals of Care Conversation:  - 2/7/24: I met Ms. Saravia, intubated, not on sedation but not responsive or following commands, on spontaneous breathing trial. No family at bedside. I called her daughter [facility], who shared her understanding that her mother was newly found to have many mini-strokes in her brain, but believed that the doctors were looking for the source of the clot. We reviewed her mother's severe medical illnesses, including Ayaz's gangrene for which she received several debridements/wound vac exchanges, multiple IV antibiotics which she is still on, severe hyperglycemia, dependence on ventilator to protect her airway in setting of severe sepsis, acute renal failure now dependent on dialysis machine and encephalopathy for which the doctors discovered multiple mini-strokes. I shared that I learned that the team engaged the stroke physicians, who expressed concern that the pattern of stroke in the setting of severe  infection might be related to each other, and are searching for possible sources of infection hiding elsewhere in her body, including her heart. Aleks asked if possibly the sponge inside her mother's wound was the source of infection, and I shared that knowing that that the surgeons are still doing wound vac exchanges, that likely it isn't because they are treating her proactively to not only wipe away any infection that they can, but to also proactively treat her to minimize her risk of worsening infection. I admitted that I'm not a surgeon and cannot explain this as helpful as I wish I could. Aleks shared that thankfully the doctor on the surgical team has been communicating in understandable terms so feels assured that she is being informed thoroughly and trusts that she can ask these questions again in the future. She was appropriately tearful as I explained concern that her mother is critically ill and that her surgical team is aggressively treating her and thoroughly looking for any opportunities to help her get better. Aleks expressed gratefulness for this.  - I asked Aleks to share with me more about her mother. 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 She lives with her long-time partner of 26-28 years. Aleks is 31-years-old so recognized this man as her step-father and consider each other as family, though Ms. Saravia and her partner are not legally . Ms. Saravia also has a son, but he is in nursing home. She has five grandchildren (through son), enjoys watching movies, going out to eat, and shopping all day. This sudden illness has been a huge shock and change in her life, and are hoping to help her return to her original life. Validated how terrifying and overwhelming this is, and reassured her that the doctors are working very aggressively in hopes to restore as much of her mother's health. Did warn that while the doctors promise to be  thoughtful, compassionate and thorough, it doesn't mean we can promise the results we pray and hope for. Aleks expressed understanding. Will continue to follow along for support as Ms. Saravia's trajectory unfolds.  - 2/12/24: Met Ms. Saravia and her long-time boyfriend Mr. Gallegos at bedside. Supportive conversation held which Ms. Saravia could not participate due to persistent encephalopathy. Mr. Gallegos expresses deep thanks to the SICU team for doing everything they can to find out why she is persistently encephalopathic. He shared how shocking it was for the sudden decline after her first operation. He shares that Ms. Saravia has always been a very active woman, echoing Aleks who had shared with me earlier about her love for shopping, eating at restaurants and going to the movies. He admits that they've never discussed end of life or preferences related to artificial life support. 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 He thinks that as an active woman, a life committed to the bed would be devastating. However, today he saw her open her eyes to his voice and move her foot. This is an improvement in her neuro status and he is hoping that this is a sign that there is more improvement in the future. Agreed that it's so important to hold hope for a meaningful recovery and also staying mindful that we respect Ms. Saravia's dignity and quality of life by not committing her to a life that she would not find acceptable. He reiterated family's deep desire to exhaust all opportunities to help her improve. Reassured him that her team is absolutely doing this and also staying mindful/transparent about their own worries about our limitations as human beings as well. He tells me that he spends the nights with Ms. Saravia, leaves around 11 AM to return home to wash up/take care of responsibilities. I shared I will return  later this week in the morning to make sure I can check in again during the hours he is in the hospital.  - 2/15/24: Remains encephalopathic despite no sedation, doing well on SBT. Returned from OR for wound vac exchange and LP attempted but will be reattempted later due to needle not being long enough to receive CSF fluid. Spoke with daughter on the phone who was very aware of the above, and she shared her conversation about trach/PEG with the surgery team earlier. She shared her understanding that these were reversible interventions in the future when Ms. Saravia recovered. She shared that expectations are for her mother to make a meaningful recovery. I asked what if Ms. Saravia did not improve, and she had to live the rest of her life dependent on others, on machines with trach/PEG. I asked her what would Ms. Saravia say in response to that, and Aleks admits that she doesn't know. There was quite a bit of silence during this conversation. Acknowledged how difficult this is, but that life after trach/PEG would involve likely nursing homes for SNF, and likely trajectory of back and forth from SNF to hospital. Aleks remains hopeful that her mother will make a meaningful recovery and return home. Ms. Saravia's friends are in a very similar field of health care, and would help out at home as well. Would benefit from counseling regarding prognosis and unlikeliness to recover mental status back to baseline or inability to restore independence and prior quality of life. Otherwise, family is expecting meaningful recovery which is why they would like to proceed with full supportive care.  - 2/19/24: Spoke with Aleks via telephone. She had actually just left the hospital. She told me that she was able to ask her mom to move her hand and she did. She is so grateful she had a witness see this because everyone else has been saying that she doesn't make meaningful movement, but today she followed her daughter's requests/commands.  Because she is seeing improvement, she'd like to continue interventions that may buy her more time, including PEG/trach. She is immensely grateful for the communication from the surgeons who keep her informed and also grateful for our conversations and check ins during this difficult time. Will continue to follow along.

## 2024-02-19 NOTE — ASSESSMENT & PLAN NOTE
Transfer from [facility]. Admitted for fourniers gangrene. Initiated HD on 1/31. ALVARADO 2/2 ATN in setting of septic shock. Arrived with CR 3.8. Unknown baseline. S/P OR debridement with possible closure and wound vac placement     ALVARADO most likley ATN in setting of septic shock     Plan/Recommendation  -No urgent indications for RRT at this time. Labs stable, will monitor need on daily basis.   - Continue IV lasix 120mg TID.   -Daily RFP   -Strict I&Os  -Avoid nephrotoxins, if possible

## 2024-02-19 NOTE — PROGRESS NOTES
"[facility] - [facility]ensive Care  Endocrinology  Progress Note    Admit Date: 1/29/2024     Reason for Consult: Management of T2DM, Hyperglycemia     Surgical Procedure and Date: n/a    Diabetes diagnosis year: new onset     Home Diabetes Medications:  none per chart review and family present at bedside     Lab Results   Component Value Date    HGBA1C 10.4 (H) 01/30/2024       Diabetes Complications include:     Hyperglycemia, DKA at OSH     Complicating diabetes co morbidities:   Obesity       HPI:   Patient is a 53 y.o. female with a diagnosis of obesity (BMI 53) admitted with N/V and R groin wound found to be in DKA at OSH. 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 While at OSH pt developed acute respiratory failure requiring intubation. Pulmonology was consulted for ventilator management and critical illness. Nephrology was consulted for worsening vishal in the setting of lactic acidosis and septic shock likely ATN, sCr elevated at 3.8 on admit now 4.0 post HD. Pt being admitted to SICU for surgical critical care management. Immediate plans include hemodynamic stabilization, weaning of respiratory support, and RRT.  Endocrinology consulted for management of T2DM.                 Interval HPI:   Overnight events: Remains intubated in SICU. BG within goal ranges on current SQ insulin regimen. TFs held since MN for wound vac exchange today. Diet NPO      Eating:   NPO  Nausea: No  Hypoglycemia and intervention: No  Fever: No  TPN and/or TF: Yes  If yes, type of TF/TPN and rate: TFs at 30 ml/hr (on hold since midnight)     BP (!) 147/71 (BP Location: Left forearm, Patient Position: Lying)   Pulse 91   Temp 99.2 °F (37.3 °C) (Oral)   Resp 18   Ht 5' 5" (1.651 m)   Wt (!) 154.2 kg (340 lb)   LMP 01/01/2024 (Approximate) Comment: tubal ligation  SpO2 100%   BMI 56.58 kg/m²     Labs Reviewed and Include    Recent " "Labs   Lab 02/19/24  0159   *   CALCIUM 8.6*   ALBUMIN 1.9*   PROT 7.2   *   K 4.6   CO2 23      BUN 49*   CREATININE 3.6*   ALKPHOS 123   ALT 11   AST 30   BILITOT 0.2     Lab Results   Component Value Date    WBC 9.22 02/19/2024    HGB 7.5 (L) 02/19/2024    HCT 24.4 (L) 02/19/2024    MCV 91 02/19/2024     02/19/2024     No results for input(s): "TSH", "FREET4" in the last 168 hours.  Lab Results   Component Value Date    HGBA1C 10.4 (H) 01/30/2024       Nutritional status:   Body mass index is 56.58 kg/m².  Lab Results   Component Value Date    ALBUMIN 1.9 (L) 02/19/2024    ALBUMIN 1.8 (L) 02/18/2024    ALBUMIN 1.8 (L) 02/18/2024     No results found for: "PREALBUMIN"    Estimated Creatinine Clearance: 27.4 mL/min (A) (based on SCr of 3.6 mg/dL (H)).    Accu-Checks  Recent Labs     02/17/24  2013 02/18/24  0024 02/18/24  0355 02/18/24  0733 02/18/24  1138 02/18/24  1601 02/18/24  2033 02/19/24  0015 02/19/24  0333 02/19/24  0741   POCTGLUCOSE 139* 133* 142* 130* 147* 170* 142* 131* 127* 112*       Current Medications and/or Treatments Impacting Glycemic Control  Immunotherapy:    Immunosuppressants       None          Steroids:   Hormones (From admission, onward)      None          Pressors:    Autonomic Drugs (From admission, onward)      None          Hyperglycemia/Diabetes Medications:   Antihyperglycemics (From admission, onward)      Start     Stop Route Frequency Ordered    02/13/24 0915  insulin detemir U-100 (Levemir) pen 14 Units         -- SubQ Daily 02/13/24 0906    02/09/24 1200  insulin aspart U-100 pen 6 Units         -- SubQ Every 4 hours 02/09/24 0901    02/03/24 1010  insulin aspart U-100 pen 0-10 Units         -- SubQ Every 4 hours PRN 02/03/24 0911            ASSESSMENT and PLAN    Renal/  * Ayaz's gangrene  Managed per primary team  Optimize BG control        ALVARADO (acute kidney injury)  Lab Results   Component Value Date    CREATININE 3.6 (H) 02/19/2024     Avoid " insulin stacking  Titrate insulin slowly       Endocrine  Morbid (severe) obesity due to excess calories  Body mass index is 56.58 kg/m².  May increase insulin resistance.         New onset type 2 diabetes mellitus  BG goal 140-180  No previous hx of T2DM per family at bedside. A1c of 10.4. DKA at OSH. TF off. BG stable on regimen.      Plan:  - Continue Levemir 14 units daily.  - Continue Novolog 6 units q 4 hrs while on tube feeding (HOLD if tube feeding is stopped or BG < 100)   - Continue Moderate Dose Correction Scale  - BG monitoring q 4 hrs while NPO /TF    ** Please call Endocrine for any BG related issues **      Discharge plans: TBD    Lab Results   Component Value Date    HGBA1C 10.4 (H) 01/30/2024             Zoie Muñiz NP  Endocrinology  [facility]

## 2024-02-19 NOTE — ADDENDUM NOTE
Addendum  created 02/19/24 1600 by Talha Gallegos MD    Clinical Note Signed, Intraprocedure Blocks edited, SmartForm saved

## 2024-02-19 NOTE — ASSESSMENT & PLAN NOTE
Lab Results   Component Value Date    CREATININE 3.6 (H) 02/19/2024     Avoid insulin stacking  Titrate insulin slowly

## 2024-02-19 NOTE — ASSESSMENT & PLAN NOTE
Neuro/Psych:   #Acute Encephalopathy  CTH 2/3 with scattered hypoattenuation likely representing age indeterminate infarcts. EEG findings consistent with moderate-severe encephalopathy. MRI 2/6: Several scattered punctate acute infarcts throughout the bilateral frontal lobes, centrum semiovale, basal ganglia, corpus callosum, and cerebellar hemispheres.  CTA 2/6: Atherosclerotic plaquing of the carotid bifurcations and proximal ICAs with less than 50% proximal ICA stenosis by NASCET criteria . Repeat CTH and MRI/MRA unchanged from prior exams. S/p multiple wound vac exchanges. Palliative onboard with family wanting full measures.    Neuro/Psych  Repeat CTH and MRI/MRA stable from initial reads. LP 2/16. Elevated WBCs and protein consistent with bacterial meningitis.  -- Sedation: None  -- Pain: kermit tylenol, dialudid and oxy prn  -- GCS 5T: E2, V1T, M2; exam stable  -- Neurology following; appreciate recs  -- Neurovascular following previously; signed off 2/17  -- Palliative following; GOC conversation 2/7; appreciate recs  -- CSF from LP misplaced. Fluoro, IR resident performing LP, and lab contacted. Fluro staff with be back 2/19 and will fu on handling of CSF so cultures can be obtained             Cards:   -- HDS  -- goal SBP < 180, MAP >65  -- normotensive; hydralazine and labetalol prns  -- Continue amlodipine 10mg daily; coreg 25mg BID      Pulm:   #Acute Respiratory Failure  -- Intubated on spontaneous , Pressure support.  -- Goal O2 sat > 90%  -- Trach/PEG this week?      Renal:  #ALVARADO on CKD  #ATN  -- 2/13 [facility] trialysis  -- Nephrology following; appreciate recs  -- increased phos, on sevelamer, otherwise stable electrolytes.  -- 120mg IV lasix tid per nephro  -- UOP: 1.1L in 24hrs (0.3 ml/kg/hr)    Recent Labs   Lab 02/18/24  1453 02/18/24  2204 02/19/24  0159   BUN 47* 46*  46* 49*   CREATININE 3.6* 3.9*  3.9* 3.6*        FEN / GI:   -- Daily CMPs  -- NGT in place   -- Replace lytes as needed  --  Nutrition: NPO, TF (Novasource Renal) at goal 30 cc/hr.  Hold at midnight for OR 2/19  -- GI PPX   -- Nutrition following; appreciate recs  -- Continue psyllium and sevelamer      ID:    #Ayaz's gangrene  s/p wound vac exchange/ debridement x4 for nec fascitis. R groin tissue with proteus, sensitive to ceftriaxone. Growing bacteroids as well.   -- Abx: ceftriaxone and flagyl EOT 2/22  -- ID following ; appreciate recs  -- Urine cx 2/18 growing C. Indologens. ID consult for recs.   -- Wound vac exchange Monday 2/19       Heme/Onc:  #Anemia  -- Daily CBC  -- Heparin DVT ppx  -- Transfuse if Hgb <7     Endo:   #IDDM  Initially presented to OSH with DKA with latest A1C 10.4 AG Gap resolved  Placed on insulin gtt 2/3 and dced 2/12.    - q4h BG monitoring while NPO  - Detemir 14 units daily  - Novolog 6units q4h while on TFs  - Moderate dose SSI PRN  - Endocrine following, appreciate recs      PPx:   Feeding: NPO, Tube Feeds  Analgesia/Sedation: See above  Thromboembolic prevention: SQH   HOB >30: Y  Stress Ulcer ppx: Famotidine  Glucose control: Goal 140-180 g/dl, kermit detemir and novolog, SSI, Endo following  Bowel reg: psyllium  Invasive Lines/Drains/Airway: Glynn, ETT, [facility] Trialysis, PIV x2      Dispo/Code Status/Palliative:   -- SICU / Full Code   -- Pending LP cultures  -- Wound vac exchange today

## 2024-02-19 NOTE — ASSESSMENT & PLAN NOTE
Multiple acute embolic strokes noted on MRI. TTE without evidence of infective endocarditis. Blood cultures no growth. Underwent operative JAY without any reported abnormalities.     Patient now s/p diagnostic lumbar puncture on 2/16. CSF pleocytosis noted however a CSF WBC of 8 is not indicated of an infectious process. Suspect abnormal cell count is due to ischemic embolic process noted on imaging.   Unlikely that emboli as septic in nature as patient was not actively bacteremic, does not meet clinical criteria for IE based on modified Duke Criteria, and JAY was negative for vegetations and structural abnormalities of the heart valves.

## 2024-02-19 NOTE — ASSESSMENT & PLAN NOTE
I have reviewed hospital notes from  SICU service and other specialty providers. I have also reviewed CBC, CMP/BMP,  cultures and imaging with my interpretation as documented.     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 Operative cultures showing P mirabilis. S/P wound VAC exchange on 2/6. S/p wound VAC exchange and debridement on 2/9.     Since last seen patient has had OR wound VAC exchanges. She is afebrile and without leukocytosis. Transitioned to Zosyn by SICU team.  Can continue Zosyn for now.  If not further debridements have been required then would discontinue antibiotics on 2/22/24.  Monitor CBC and CMP weekly while on antibiotics.  Discussed management plan with the staff and/or members from SICU service.

## 2024-02-19 NOTE — ASSESSMENT & PLAN NOTE
53 y.o. 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     - OR today for wound vac change, TF held since MN   - Last [place] change 2/15  - Will discuss plans for trach/PEG/permacath/stoma in the setting of fevers. No fevers in 24 hours. Likely Thursday pending family discussions  - LP 2/16 - Specimen for cultures is missing.   - ID consulted for urine cultures - recommend broadening to zosyn    - UA growing Burkholderia species and Chryseibacterium Indologenes   - Palliative following given overall poor prognosis, daughter would like everything done;  - Stable on spontaneous   - Okay for DVT ppx   - Remainder of care per SICU

## 2024-02-19 NOTE — CARE UPDATE
"      SICU PLAN OF CARE NOTE    Dx: Ayaz's gangrene    Shift Events: NPO @ midnight, bath given, sheets changed. Magnesium replaced.     Goals of Care: MAPs >65, SBP <170, Accuchecks Q4H    Neuro: Arouses to Voice    Vital Signs: BP (!) 154/80 (BP Location: Left forearm, Patient Position: Lying)   Pulse 82   Temp 98.4 °F (36.9 °C) (Oral)   Resp 17   Ht 5' 5" (1.651 m)   Wt (!) 154.2 kg (340 lb)   LMP 01/01/2024 (Approximate) Comment: tubal ligation  SpO2 100%   BMI 56.58 kg/m²     Respiratory: Ventilator    Diet: NPO and Tube Feeds    Urine Output: Urinary Catheter 1175 cc/shift    Drains: G-tube/J-tube, total output 150 cc /  shift    Wound Vac 150 output, serous. Cannister changed.     Labs/Accuchecks: Daily labs, CRRT labs, accuchecks Q4H..    Skin: Foams to heels, rolling w/ 3+ assist. Bath given, sheets changed. Immerse bed in use and plugged in.       "

## 2024-02-19 NOTE — SUBJECTIVE & OBJECTIVE
Interval History:     No acute events overnight. Patient remains intubated, possible OR today. Remains hemodynamically stable.     Review of patient's allergies indicates:  No Known Allergies  Current Facility-Administered Medications   Medication Frequency    0.9%  NaCl infusion (for blood administration) Q24H PRN    0.9%  NaCl infusion PRN    0.9%  NaCl infusion Once    acetaminophen tablet 650 mg Q4H PRN    albuterol-ipratropium 2.5 mg-0.5 mg/3 mL nebulizer solution 3 mL Q4H PRN    amLODIPine tablet 10 mg Daily    carvediloL tablet 25 mg BID    dextrose 10 % infusion Continuous PRN    dextrose 10% bolus 125 mL 125 mL PRN    dextrose 10% bolus 250 mL 250 mL PRN    furosemide injection 120 mg TID    glucagon (human recombinant) injection 1 mg PRN    glucose chewable tablet 16 g PRN    glucose chewable tablet 24 g PRN    heparin (porcine) injection 7,500 Units Q8H    hydrALAZINE injection 10 mg Q4H PRN    insulin aspart U-100 pen 0-10 Units Q4H PRN    insulin aspart U-100 pen 6 Units Q4H    insulin detemir U-100 (Levemir) pen 14 Units Daily    labetalol 20 mg/4 mL (5 mg/mL) IV syring Q6H PRN    naloxone 0.4 mg/mL injection 0.02 mg PRN    ondansetron injection 4 mg Q6H PRN    oxyCODONE 5 mg/5 mL solution 5 mg Q6H PRN    piperacillin-tazobactam (ZOSYN) 4.5 g in dextrose 5 % in water (D5W) 100 mL IVPB (MB+) Q8H    prochlorperazine injection Soln 5 mg Q6H PRN    psyllium husk (aspartame) 3.4 gram packet 1 packet BID    sevelamer carbonate pwpk 1.6 g TID WM    sodium chloride 0.9% bolus 250 mL 250 mL PRN    sodium chloride 0.9% bolus 250 mL 250 mL PRN    sodium chloride 0.9% flush 10 mL PRN       Objective:     Vital Signs (Most Recent):  Temp: 98.3 °F (36.8 °C) (02/19/24 1100)  Pulse: 82 (02/19/24 1306)  Resp: 16 (02/19/24 1306)  BP: (!) 140/68 (02/19/24 1306)  SpO2: 100 % (02/19/24 1306) Vital Signs (24h Range):  Temp:  [98.3 °F (36.8 °C)-99.2 °F (37.3 °C)] 98.3 °F (36.8 °C)  Pulse:  [78-95] 82  Resp:  [15-39]  16  SpO2:  [99 %-100 %] 100 %  BP: (112-162)/(56-89) 140/68     Weight: (!) 154.2 kg (340 lb) (02/19/24 0300)  Body mass index is 56.58 kg/m².  Body surface area is 2.66 meters squared.    I/O last 3 completed shifts:  In: 1424.3 [I.V.:12.6; NG/GT:915; IV Piggyback:496.7]  Out: 3425 [Urine:2850; Drains:150; Other:425]     Physical Exam  Constitutional:       Appearance: She is obese.   HENT:      Head: Normocephalic and atraumatic.      Right Ear: External ear normal.      Left Ear: External ear normal.      Nose: Nose normal.      Mouth/Throat:      Mouth: Mucous membranes are dry.   Eyes:      Conjunctiva/sclera: Conjunctivae normal.   Cardiovascular:      Rate and Rhythm: Normal rate.   Pulmonary:      Breath sounds: Normal breath sounds.      Comments: On spontaneous breathing trial, ETT connected to ventilator  Abdominal:      General: Bowel sounds are normal.      Palpations: Abdomen is soft.   Musculoskeletal:         General: Swelling present.   Lymphadenopathy:      Cervical: No cervical adenopathy.   Skin:     General: Skin is warm and dry.   Neurological:      Comments: Not following commands despite no sedation          Significant Labs:  All labs within the past 24 hours have been reviewed.     Significant Imaging:  Labs: Reviewed

## 2024-02-19 NOTE — PROGRESS NOTES
[facility]  Nephrology  Progress Note    Patient Name: Sarah Saravia  MRN: [id number]  Admission Date: 1/29/2024  Hospital Length of Stay: 21 days  Attending Provider: Steve Cole MD   Primary Care Physician: Patricia [facility]  Principal Problem:Ayaz's gangrene    Subjective:     HPI: Sarah Saravia is a 53 year old female with a past medical history of obesity (BMI 53) admitted with N/V and R groin wound found to be in DKA at OSH.  She underwent a CT abdomen and pelvis that showed extensive soft tissue air throughout the right groin and inguinal region and extending to involve the right lower quadrant anterior abdominal wall with extensive soft tissue air also seen involving the proximal medial aspect of the right thigh, concerning for gas-forming infection. She is s/p OR debridement for necrotizing fascitis on 1/29/24 and take back for 1/31/24. Right groin tissue growing proteus, susceptibilities still pending. Currently on meropenem and clindamycin which was d/c'd on 1/31/24. While at OSH pt developed acute respiratory failure requiring intubation. Nephrology was consulted for worsening alvarado in the setting of lactic acidosis and septic shock likely ATN, sCr elevated at 3.8 on admit.  OR today for debridement with possible closure and wound vac placement. Last HD 2/1. Net -1.3L. Electrolytes stable. Nephrology consulted for ALVARADO.     Interval History:     No acute events overnight. Patient remains intubated, possible OR today. Remains hemodynamically stable.     Review of patient's allergies indicates:  No Known Allergies  Current Facility-Administered Medications   Medication Frequency    0.9%  NaCl infusion (for blood administration) Q24H PRN    0.9%  NaCl infusion PRN    0.9%  NaCl infusion Once    acetaminophen tablet 650 mg Q4H PRN    albuterol-ipratropium 2.5 mg-0.5 mg/3 mL nebulizer solution 3 mL Q4H PRN    amLODIPine tablet 10 mg Daily    carvediloL  tablet 25 mg BID    dextrose 10 % infusion Continuous PRN    dextrose 10% bolus 125 mL 125 mL PRN    dextrose 10% bolus 250 mL 250 mL PRN    furosemide injection 120 mg TID    glucagon (human recombinant) injection 1 mg PRN    glucose chewable tablet 16 g PRN    glucose chewable tablet 24 g PRN    heparin (porcine) injection 7,500 Units Q8H    hydrALAZINE injection 10 mg Q4H PRN    insulin aspart U-100 pen 0-10 Units Q4H PRN    insulin aspart U-100 pen 6 Units Q4H    insulin detemir U-100 (Levemir) pen 14 Units Daily    labetalol 20 mg/4 mL (5 mg/mL) IV syring Q6H PRN    naloxone 0.4 mg/mL injection 0.02 mg PRN    ondansetron injection 4 mg Q6H PRN    oxyCODONE 5 mg/5 mL solution 5 mg Q6H PRN    piperacillin-tazobactam (ZOSYN) 4.5 g in dextrose 5 % in water (D5W) 100 mL IVPB (MB+) Q8H    prochlorperazine injection Soln 5 mg Q6H PRN    psyllium husk (aspartame) 3.4 gram packet 1 packet BID    sevelamer carbonate pwpk 1.6 g TID WM    sodium chloride 0.9% bolus 250 mL 250 mL PRN    sodium chloride 0.9% bolus 250 mL 250 mL PRN    sodium chloride 0.9% flush 10 mL PRN       Objective:     Vital Signs (Most Recent):  Temp: 98.3 °F (36.8 °C) (02/19/24 1100)  Pulse: 82 (02/19/24 1306)  Resp: 16 (02/19/24 1306)  BP: (!) 140/68 (02/19/24 1306)  SpO2: 100 % (02/19/24 1306) Vital Signs (24h Range):  Temp:  [98.3 °F (36.8 °C)-99.2 °F (37.3 °C)] 98.3 °F (36.8 °C)  Pulse:  [78-95] 82  Resp:  [15-39] 16  SpO2:  [99 %-100 %] 100 %  BP: (112-162)/(56-89) 140/68     Weight: (!) 154.2 kg (340 lb) (02/19/24 0300)  Body mass index is 56.58 kg/m².  Body surface area is 2.66 meters squared.    I/O last 3 completed shifts:  In: 1424.3 [I.V.:12.6; NG/GT:915; IV Piggyback:496.7]  Out: 3425 [Urine:2850; Drains:150; Other:425]     Physical Exam  Constitutional:       Appearance: She is obese.   HENT:      Head: Normocephalic and atraumatic.      Right Ear: External ear normal.      Left Ear: External ear normal.      Nose: Nose normal.       Mouth/Throat:      Mouth: Mucous membranes are dry.   Eyes:      Conjunctiva/sclera: Conjunctivae normal.   Cardiovascular:      Rate and Rhythm: Normal rate.   Pulmonary:      Breath sounds: Normal breath sounds.      Comments: On spontaneous breathing trial, ETT connected to ventilator  Abdominal:      General: Bowel sounds are normal.      Palpations: Abdomen is soft.   Musculoskeletal:         General: Swelling present.   Lymphadenopathy:      Cervical: No cervical adenopathy.   Skin:     General: Skin is warm and dry.   Neurological:      Comments: Not following commands despite no sedation          Significant Labs:  All labs within the past 24 hours have been reviewed.     Significant Imaging:  Labs: Reviewed  Assessment/Plan:     Renal/  * Yaaz's gangrene  - management per primary     ALVARADO (acute kidney injury)  Transfer from [facility]. Admitted for fourniers gangrene. Initiated HD on 1/31. ALVARADO 2/2 ATN in setting of septic shock. Arrived with CR 3.8. Unknown baseline. S/P OR debridement with possible closure and wound vac placement     ALVARADO most likley ATN in setting of septic shock     Plan/Recommendation  -No urgent indications for RRT at this time. Labs stable, will monitor need on daily basis.   - Continue IV lasix 120mg TID.   -Daily RFP   -Strict I&Os  -Avoid nephrotoxins, if possible     ID  Severe sepsis  -        Thank you for your consult. I will follow-up with patient. Please contact us if you have any additional questions.    Case discussed with attending. Attestation to follow.       Antione Hazel DO  Nephrology  [facility]

## 2024-02-19 NOTE — SUBJECTIVE & OBJECTIVE
Interval History: NAEO. VSS. AF. OR today for wound vac change.     Medications:  Continuous Infusions:   dextrose 10 % in water (D10W)       Scheduled Meds:   sodium chloride 0.9%   Intravenous Once    amLODIPine  10 mg Per OG tube Daily    carvediloL  25 mg Per OG tube BID    cefTRIAXone (Rocephin) IV (PEDS and ADULTS)  2 g Intravenous Daily    famotidine  20 mg Per OG tube Daily    furosemide (LASIX) injection  120 mg Intravenous TID    heparin (porcine)  7,500 Units Subcutaneous Q8H    insulin aspart U-100  6 Units Subcutaneous Q4H    insulin detemir U-100  14 Units Subcutaneous Daily    metronidazole  500 mg Intravenous Q8H    psyllium husk (aspartame)  1 packet Per OG tube BID    sevelamer carbonate  1.6 g Per OG tube TID WM     PRN Meds:0.9%  NaCl infusion (for blood administration), sodium chloride 0.9%, acetaminophen, albuterol-ipratropium, dextrose 10 % in water (D10W), dextrose 10%, dextrose 10%, glucagon (human recombinant), glucose, glucose, hydrALAZINE, insulin aspart U-100, labetalol, naloxone, ondansetron, oxyCODONE, prochlorperazine, sodium chloride 0.9%, sodium chloride 0.9%, sodium chloride 0.9%     Review of patient's allergies indicates:  No Known Allergies  Objective:     Vital Signs (Most Recent):  Temp: 99.2 °F (37.3 °C) (02/19/24 0700)  Pulse: 79 (02/19/24 0730)  Resp: 16 (02/19/24 0730)  BP: (!) 141/69 (02/19/24 0700)  SpO2: 100 % (02/19/24 0730) Vital Signs (24h Range):  Temp:  [98.4 °F (36.9 °C)-99.6 °F (37.6 °C)] 99.2 °F (37.3 °C)  Pulse:  [79-95] 79  Resp:  [15-39] 16  SpO2:  [99 %-100 %] 100 %  BP: (112-162)/(56-89) 141/69     Weight: (!) 154.2 kg (340 lb)  Body mass index is 56.58 kg/m².    Intake/Output - Last 3 Shifts         02/17 0700 02/18 0659 02/18 0700 02/19 0659 02/19 0700 02/20 0659    I.V. (mL/kg)  12.6 (0.1)     Blood       NG/ 735     IV Piggyback 395 298.6     Total Intake(mL/kg) 1090 (6.9) 1046.2 (6.8)     Urine (mL/kg/hr) 1155 (0.3) 2265 (0.6) 55 (0.5)     Drains  150 100    Other 250 325     Stool  0     Total Output 1405 2740 155    Net -315 -1693.8 -155           Stool Occurrence  1 x              Physical Exam  Vitals and nursing note reviewed.   Constitutional:       General: She is not in acute distress.     Appearance: She is ill-appearing.   HENT:      Head: Normocephalic and atraumatic.   Eyes:      Extraocular Movements: Extraocular movements intact.      Conjunctiva/sclera: Conjunctivae normal.   Neck:      Comments: Left IJ Trialysis catheter  Cardiovascular:      Rate and Rhythm: Normal rate and regular rhythm.   Pulmonary:      Effort: Pulmonary effort is normal.      Comments: Intubated, on spontaneous    Abdominal:      General: There is no distension.      Palpations: Abdomen is soft.      Tenderness: There is no abdominal tenderness.   Genitourinary:     Comments: Glynn in place    Skin:     General: Skin is warm and dry.      Comments: Wound vac in place to R thigh/groin to suction   Neurological:      General: No focal deficit present.      Mental Status: She is oriented to person, place, and time.   Psychiatric:         Mood and Affect: Mood normal.         Behavior: Behavior normal.          Significant Labs:  I have reviewed all pertinent lab results within the past 24 hours.  CBC:   Recent Labs   Lab 02/19/24  0159   WBC 9.22   RBC 2.69*   HGB 7.5*   HCT 24.4*      MCV 91   MCH 27.9   MCHC 30.7*     BMP:   Recent Labs   Lab 02/19/24  0159   *   *   K 4.6      CO2 23   BUN 49*   CREATININE 3.6*   CALCIUM 8.6*   MG 1.8       Significant Diagnostics:  I have reviewed all pertinent imaging results/findings within the past 24 hours.

## 2024-02-19 NOTE — NURSING
Surgery team @ bedside to perform wound vac change. Orders given per Dr. Philippe for 1mg Dilaudid for pt. Bilateral wound vacs changed w/o issues.

## 2024-02-19 NOTE — ANESTHESIA POSTPROCEDURE EVALUATION
Anesthesia Post Evaluation    Patient: Sarah Saravia    Procedure(s) Performed: Procedure(s) (LRB):  REPLACEMENT, WOUND VAC (N/A)    Final Anesthesia Type: general      Patient location during evaluation: ICU  Patient participation: No - Unable to Participate, Intubation  Level of consciousness: sedated  Post-procedure vital signs: reviewed and stable  Pain management: adequate  Airway patency: patent    PONV status: None or treated.  Anesthetic complications: no      Cardiovascular status: hemodynamically stable  Respiratory status: ventilator and intubated  Hydration status: euvolemic  Follow-up not needed.          Vitals Value Taken Time   /63 02/19/24 0832   Temp 37.3 °C (99.2 °F) 02/19/24 0700   Pulse 82 02/19/24 0832   Resp 20 02/19/24 0832   SpO2 98 % 02/19/24 0832   Vitals shown include unvalidated device data.      No case tracking events are documented in the log.      Pain/Lucía Score: Pain Rating Prior to Med Admin: -- (PABLO w/ pain scale) (2/18/2024  9:15 PM)  Pain Rating Post Med Admin: 0 (2/18/2024 10:15 PM)

## 2024-02-19 NOTE — SUBJECTIVE & OBJECTIVE
Interval History/Significant Events: Pt seen and examined at bedside. BISHNU MULLER.    Follow-up For: Procedure(s) (LRB):  Lumbar Puncture (N/A)    Post-Operative Day: 3 Days Post-Op    Objective:     Vital Signs (Most Recent):  Temp: 98.4 °F (36.9 °C) (02/19/24 0315)  Pulse: 82 (02/19/24 0615)  Resp: 17 (02/19/24 0615)  BP: (!) 154/80 (02/19/24 0600)  SpO2: 100 % (02/19/24 0615) Vital Signs (24h Range):  Temp:  [98.4 °F (36.9 °C)-99.7 °F (37.6 °C)] 98.4 °F (36.9 °C)  Pulse:  [80-95] 82  Resp:  [15-39] 17  SpO2:  [99 %-100 %] 100 %  BP: (112-162)/(56-89) 154/80     Weight: (!) 154.2 kg (340 lb)  Body mass index is 56.58 kg/m².      Intake/Output Summary (Last 24 hours) at 2/19/2024 0650  Last data filed at 2/19/2024 0600  Gross per 24 hour   Intake 1046.18 ml   Output 2740 ml   Net -1693.82 ml          Physical Exam   Vitals and nursing note reviewed.   Constitutional:       General: She is not in acute distress.     Appearance: She is ill-appearing.   HENT:      Head: Normocephalic and atraumatic.   Eyes:      Extraocular Movements: Extraocular movements intact.      Conjunctiva/sclera: Conjunctivae normal.   Neck:      Comments: Left IJ Trialysis catheter  Cardiovascular:      Rate and Rhythm: Normal rate and regular rhythm.   Pulmonary:      Effort: Pulmonary effort is normal.      Comments: Intubated, on spontaneous     Abdominal:      General: There is no distension.      Palpations: Abdomen is soft.      Tenderness: There is no abdominal tenderness.   Genitourinary:     Comments: Glynn in place     Skin:     General: Skin is warm and dry.      Comments: Wound vac in place to R thigh/groin to suction   Neurological:      General: No focal deficit present.      Mental Status: She is oriented to person, place, and time.   Psychiatric:         Mood and Affect: Mood normal.         Behavior: Behavior normal.    Vents:  Vent Mode: Spont (02/19/24 8065)  Set Rate: 18 BPM (02/06/24 0349)  Vt Set: 420 mL (02/06/24  0349)  Pressure Support: 10 cmH20 (02/19/24 0435)  PEEP/CPAP: 5 cmH20 (02/19/24 0435)  Oxygen Concentration (%): 30 (02/19/24 0615)  Peak Airway Pressure: 15 cmH20 (02/19/24 0435)  Plateau Pressure: 15 cmH20 (02/19/24 0435)  Total Ve: 7.4 L/m (02/19/24 0435)  Negative Inspiratory Force (cm H2O): 0 (02/19/24 0435)  F/VT Ratio<105 (RSBI): (!) 42.04 (02/19/24 0435)    Lines/Drains/Airways       Central Venous Catheter Line  Duration             Trialysis (Dialysis) Catheter 02/11/24 2303 left internal jugular 7 days              Drain  Duration                  NG/OG Tube 02/06/24 1030 Center mouth 12 days         Urethral Catheter 02/16/24 1215 2 days              Airway  Duration                  Airway - Non-Surgical 01/31/24 1020 18 days              Peripheral Intravenous Line  Duration                  Peripheral IV - Single Lumen 02/13/24 1132 18 G;1 3/4 in Right Forearm 5 days                    Significant Labs:    CBC/Anemia Profile:  Recent Labs   Lab 02/18/24  0338 02/19/24  0159   WBC 10.13 9.22   HGB 7.4* 7.5*   HCT 23.9* 24.4*    379   MCV 88 91   RDW 17.3* 17.5*        Chemistries:  Recent Labs   Lab 02/18/24  0338 02/18/24  1453 02/18/24  2204 02/19/24  0159    137 135*  135* 135*   K 4.5 4.7 4.5  4.5 4.6    105 103  103 103   CO2 21* 22* 22*  22* 23   BUN 44* 47* 46*  46* 49*   CREATININE 3.5* 3.6* 3.9*  3.9* 3.6*   CALCIUM 8.5* 8.5* 8.8  8.8 8.6*   ALBUMIN 1.8* 1.8* 1.8*  1.8* 1.9*   PROT 7.0  --   --  7.2   BILITOT 0.2  --   --  0.2   ALKPHOS 125  --   --  123   ALT 10  --   --  11   AST 22  --   --  30   MG 1.9 1.8 1.8  1.8 1.8   PHOS 5.4* 5.6* 5.8*  5.8* 6.0*       Significant Imaging:  I have reviewed all pertinent imaging results/findings within the past 24 hours.

## 2024-02-19 NOTE — ASSESSMENT & PLAN NOTE
Fever and leukocytosis on 2/13. Samples collected for culture on 2/14 via old meza catheter with Burkholderia multivorans. Repeat urinalysis and culture after meza replacement on 2/16 with Chryseobacterium indologenes. After meza replacement she has been afebrile and without leukocytosis.     Infections with these organisms are usually related to indwelling devices such as the meza catheter. In this patient, I suspect likely colonization of the indwelling meza and/or urinary tract as fever and leukocytosis resolved without culture directed antibiotic therapy.   Can continue Zosyn for now as therapy was changed today.  Monitor for fever and leukocytosis.  Recommend limiting antibiotic therapy as possible as patient has organisms with antimicrobial resistance.

## 2024-02-20 LAB
ALBUMIN SERPL BCP-MCNC: 1.9 G/DL (ref 3.5–5.2)
ALBUMIN SERPL BCP-MCNC: 2 G/DL (ref 3.5–5.2)
ALBUMIN SERPL BCP-MCNC: 2 G/DL (ref 3.5–5.2)
ALLENS TEST: ABNORMAL
ALP SERPL-CCNC: 120 U/L (ref 55–135)
ALT SERPL W/O P-5'-P-CCNC: 13 U/L (ref 10–44)
ANION GAP SERPL CALC-SCNC: 11 MMOL/L (ref 8–16)
ANION GAP SERPL CALC-SCNC: 13 MMOL/L (ref 8–16)
ANION GAP SERPL CALC-SCNC: 7 MMOL/L (ref 8–16)
AST SERPL-CCNC: 31 U/L (ref 10–40)
BASOPHILS # BLD AUTO: 0.06 K/UL (ref 0–0.2)
BASOPHILS NFR BLD: 0.7 % (ref 0–1.9)
BILIRUB SERPL-MCNC: 0.2 MG/DL (ref 0.1–1)
BUN SERPL-MCNC: 41 MG/DL (ref 6–20)
BUN SERPL-MCNC: 53 MG/DL (ref 6–20)
BUN SERPL-MCNC: 64 MG/DL (ref 6–20)
CA-I BLDV-SCNC: 1.13 MMOL/L (ref 1.06–1.42)
CALCIUM SERPL-MCNC: 8.2 MG/DL (ref 8.7–10.5)
CALCIUM SERPL-MCNC: 8.8 MG/DL (ref 8.7–10.5)
CALCIUM SERPL-MCNC: 8.9 MG/DL (ref 8.7–10.5)
CHLORIDE SERPL-SCNC: 103 MMOL/L (ref 95–110)
CHLORIDE SERPL-SCNC: 104 MMOL/L (ref 95–110)
CHLORIDE SERPL-SCNC: 107 MMOL/L (ref 95–110)
CO2 SERPL-SCNC: 22 MMOL/L (ref 23–29)
CO2 SERPL-SCNC: 25 MMOL/L (ref 23–29)
CO2 SERPL-SCNC: 25 MMOL/L (ref 23–29)
CREAT SERPL-MCNC: 3.5 MG/DL (ref 0.5–1.4)
CREAT SERPL-MCNC: 3.9 MG/DL (ref 0.5–1.4)
CREAT SERPL-MCNC: 4 MG/DL (ref 0.5–1.4)
DIFFERENTIAL METHOD BLD: ABNORMAL
EOSINOPHIL # BLD AUTO: 0.7 K/UL (ref 0–0.5)
EOSINOPHIL NFR BLD: 7.8 % (ref 0–8)
ERYTHROCYTE [DISTWIDTH] IN BLOOD BY AUTOMATED COUNT: 17.7 % (ref 11.5–14.5)
EST. GFR  (NO RACE VARIABLE): 12.8 ML/MIN/1.73 M^2
EST. GFR  (NO RACE VARIABLE): 13.2 ML/MIN/1.73 M^2
EST. GFR  (NO RACE VARIABLE): 15 ML/MIN/1.73 M^2
FINAL PATHOLOGIC DIAGNOSIS: NORMAL
GLUCOSE SERPL-MCNC: 129 MG/DL (ref 70–110)
GLUCOSE SERPL-MCNC: 135 MG/DL (ref 70–110)
GLUCOSE SERPL-MCNC: 164 MG/DL (ref 70–110)
HCO3 UR-SCNC: 25.9 MMOL/L (ref 24–28)
HCT VFR BLD AUTO: 24.2 % (ref 37–48.5)
HGB BLD-MCNC: 7.4 G/DL (ref 12–16)
IMM GRANULOCYTES # BLD AUTO: 0.05 K/UL (ref 0–0.04)
IMM GRANULOCYTES NFR BLD AUTO: 0.6 % (ref 0–0.5)
LYMPHOCYTES # BLD AUTO: 1.7 K/UL (ref 1–4.8)
LYMPHOCYTES NFR BLD: 19.2 % (ref 18–48)
Lab: NORMAL
MAGNESIUM SERPL-MCNC: 1.9 MG/DL (ref 1.6–2.6)
MAGNESIUM SERPL-MCNC: 2.1 MG/DL (ref 1.6–2.6)
MAGNESIUM SERPL-MCNC: 2.2 MG/DL (ref 1.6–2.6)
MCH RBC QN AUTO: 27 PG (ref 27–31)
MCHC RBC AUTO-ENTMCNC: 30.6 G/DL (ref 32–36)
MCV RBC AUTO: 88 FL (ref 82–98)
MONOCYTES # BLD AUTO: 1.2 K/UL (ref 0.3–1)
MONOCYTES NFR BLD: 13.6 % (ref 4–15)
NEUTROPHILS # BLD AUTO: 5 K/UL (ref 1.8–7.7)
NEUTROPHILS NFR BLD: 58.1 % (ref 38–73)
NRBC BLD-RTO: 0 /100 WBC
PCO2 BLDA: 38.3 MMHG (ref 35–45)
PH SMN: 7.44 [PH] (ref 7.35–7.45)
PHOSPHATE SERPL-MCNC: 4.1 MG/DL (ref 2.7–4.5)
PHOSPHATE SERPL-MCNC: 6.3 MG/DL (ref 2.7–4.5)
PHOSPHATE SERPL-MCNC: 6.6 MG/DL (ref 2.7–4.5)
PLATELET # BLD AUTO: 355 K/UL (ref 150–450)
PMV BLD AUTO: 9.9 FL (ref 9.2–12.9)
PO2 BLDA: 124 MMHG (ref 80–100)
POC BE: 2 MMOL/L
POC SATURATED O2: 99 % (ref 95–100)
POC TCO2: 27 MMOL/L (ref 23–27)
POCT GLUCOSE: 136 MG/DL (ref 70–110)
POCT GLUCOSE: 141 MG/DL (ref 70–110)
POCT GLUCOSE: 144 MG/DL (ref 70–110)
POCT GLUCOSE: 146 MG/DL (ref 70–110)
POCT GLUCOSE: 153 MG/DL (ref 70–110)
POCT GLUCOSE: 158 MG/DL (ref 70–110)
POCT GLUCOSE: 190 MG/DL (ref 70–110)
POTASSIUM SERPL-SCNC: 4.5 MMOL/L (ref 3.5–5.1)
POTASSIUM SERPL-SCNC: 4.6 MMOL/L (ref 3.5–5.1)
POTASSIUM SERPL-SCNC: 4.7 MMOL/L (ref 3.5–5.1)
PROT SERPL-MCNC: 7.3 G/DL (ref 6–8.4)
RBC # BLD AUTO: 2.74 M/UL (ref 4–5.4)
SAMPLE: ABNORMAL
SITE: ABNORMAL
SODIUM SERPL-SCNC: 139 MMOL/L (ref 136–145)
WBC # BLD AUTO: 8.63 K/UL (ref 3.9–12.7)

## 2024-02-20 PROCEDURE — 25000003 PHARM REV CODE 250: Performed by: STUDENT IN AN ORGANIZED HEALTH CARE EDUCATION/TRAINING PROGRAM

## 2024-02-20 PROCEDURE — 99900026 HC AIRWAY MAINTENANCE (STAT)

## 2024-02-20 PROCEDURE — 90945 DIALYSIS ONE EVALUATION: CPT

## 2024-02-20 PROCEDURE — 83735 ASSAY OF MAGNESIUM: CPT | Mod: 91 | Performed by: STUDENT IN AN ORGANIZED HEALTH CARE EDUCATION/TRAINING PROGRAM

## 2024-02-20 PROCEDURE — 85025 COMPLETE CBC W/AUTO DIFF WBC: CPT

## 2024-02-20 PROCEDURE — 20000000 HC ICU ROOM

## 2024-02-20 PROCEDURE — 63600175 PHARM REV CODE 636 W HCPCS

## 2024-02-20 PROCEDURE — 82803 BLOOD GASES ANY COMBINATION: CPT

## 2024-02-20 PROCEDURE — 80069 RENAL FUNCTION PANEL: CPT | Performed by: STUDENT IN AN ORGANIZED HEALTH CARE EDUCATION/TRAINING PROGRAM

## 2024-02-20 PROCEDURE — 25000242 PHARM REV CODE 250 ALT 637 W/ HCPCS

## 2024-02-20 PROCEDURE — 99233 SBSQ HOSP IP/OBS HIGH 50: CPT | Mod: ,,, | Performed by: INTERNAL MEDICINE

## 2024-02-20 PROCEDURE — 94003 VENT MGMT INPAT SUBQ DAY: CPT

## 2024-02-20 PROCEDURE — 27200966 HC CLOSED SUCTION SYSTEM

## 2024-02-20 PROCEDURE — 99291 CRITICAL CARE FIRST HOUR: CPT | Mod: 24,25,, | Performed by: SURGERY

## 2024-02-20 PROCEDURE — 25000003 PHARM REV CODE 250

## 2024-02-20 PROCEDURE — 27100171 HC OXYGEN HIGH FLOW UP TO 24 HOURS

## 2024-02-20 PROCEDURE — 83735 ASSAY OF MAGNESIUM: CPT | Performed by: STUDENT IN AN ORGANIZED HEALTH CARE EDUCATION/TRAINING PROGRAM

## 2024-02-20 PROCEDURE — 80053 COMPREHEN METABOLIC PANEL: CPT

## 2024-02-20 PROCEDURE — 94761 N-INVAS EAR/PLS OXIMETRY MLT: CPT

## 2024-02-20 PROCEDURE — 84100 ASSAY OF PHOSPHORUS: CPT | Performed by: STUDENT IN AN ORGANIZED HEALTH CARE EDUCATION/TRAINING PROGRAM

## 2024-02-20 PROCEDURE — 99900035 HC TECH TIME PER 15 MIN (STAT)

## 2024-02-20 PROCEDURE — 99232 SBSQ HOSP IP/OBS MODERATE 35: CPT | Mod: ,,, | Performed by: NURSE PRACTITIONER

## 2024-02-20 PROCEDURE — 82330 ASSAY OF CALCIUM: CPT | Performed by: STUDENT IN AN ORGANIZED HEALTH CARE EDUCATION/TRAINING PROGRAM

## 2024-02-20 PROCEDURE — 27000207 HC ISOLATION

## 2024-02-20 PROCEDURE — 36600 WITHDRAWAL OF ARTERIAL BLOOD: CPT

## 2024-02-20 PROCEDURE — 63600175 PHARM REV CODE 636 W HCPCS: Performed by: STUDENT IN AN ORGANIZED HEALTH CARE EDUCATION/TRAINING PROGRAM

## 2024-02-20 RX ORDER — HYDROCODONE BITARTRATE AND ACETAMINOPHEN 500; 5 MG/1; MG/1
TABLET ORAL CONTINUOUS
Status: DISCONTINUED | OUTPATIENT
Start: 2024-02-20 | End: 2024-02-21

## 2024-02-20 RX ORDER — MAGNESIUM SULFATE HEPTAHYDRATE 40 MG/ML
2 INJECTION, SOLUTION INTRAVENOUS
Status: ACTIVE | OUTPATIENT
Start: 2024-02-20 | End: 2024-02-21

## 2024-02-20 RX ADMIN — FUROSEMIDE 120 MG: 10 INJECTION, SOLUTION INTRAVENOUS at 08:02

## 2024-02-20 RX ADMIN — HEPARIN SODIUM 7500 UNITS: 5000 INJECTION INTRAVENOUS; SUBCUTANEOUS at 10:02

## 2024-02-20 RX ADMIN — PSYLLIUM HUSK 1 PACKET: 3.4 POWDER ORAL at 08:02

## 2024-02-20 RX ADMIN — INSULIN ASPART 6 UNITS: 100 INJECTION, SOLUTION INTRAVENOUS; SUBCUTANEOUS at 07:02

## 2024-02-20 RX ADMIN — PIPERACILLIN SODIUM AND TAZOBACTAM SODIUM 4.5 G: 4; .5 INJECTION, POWDER, FOR SOLUTION INTRAVENOUS at 08:02

## 2024-02-20 RX ADMIN — AMLODIPINE BESYLATE 10 MG: 10 TABLET ORAL at 08:02

## 2024-02-20 RX ADMIN — SODIUM CHLORIDE: 9 INJECTION, SOLUTION INTRAVENOUS at 05:02

## 2024-02-20 RX ADMIN — PIPERACILLIN SODIUM AND TAZOBACTAM SODIUM 4.5 G: 4; .5 INJECTION, POWDER, FOR SOLUTION INTRAVENOUS at 12:02

## 2024-02-20 RX ADMIN — SEVELAMER CARBONATE 1.6 G: 800 POWDER, FOR SUSPENSION ORAL at 04:02

## 2024-02-20 RX ADMIN — CARVEDILOL 25 MG: 25 TABLET, FILM COATED ORAL at 08:02

## 2024-02-20 RX ADMIN — INSULIN DETEMIR 14 UNITS: 100 INJECTION, SOLUTION SUBCUTANEOUS at 08:02

## 2024-02-20 RX ADMIN — INSULIN ASPART 6 UNITS: 100 INJECTION, SOLUTION INTRAVENOUS; SUBCUTANEOUS at 04:02

## 2024-02-20 RX ADMIN — INSULIN ASPART 6 UNITS: 100 INJECTION, SOLUTION INTRAVENOUS; SUBCUTANEOUS at 12:02

## 2024-02-20 RX ADMIN — HEPARIN SODIUM 7500 UNITS: 5000 INJECTION INTRAVENOUS; SUBCUTANEOUS at 01:02

## 2024-02-20 RX ADMIN — SEVELAMER CARBONATE 1.6 G: 800 POWDER, FOR SUSPENSION ORAL at 07:02

## 2024-02-20 RX ADMIN — FUROSEMIDE 120 MG: 10 INJECTION, SOLUTION INTRAVENOUS at 04:02

## 2024-02-20 RX ADMIN — SEVELAMER CARBONATE 1.6 G: 800 POWDER, FOR SUSPENSION ORAL at 12:02

## 2024-02-20 RX ADMIN — INSULIN ASPART 6 UNITS: 100 INJECTION, SOLUTION INTRAVENOUS; SUBCUTANEOUS at 11:02

## 2024-02-20 RX ADMIN — PIPERACILLIN SODIUM AND TAZOBACTAM SODIUM 4.5 G: 4; .5 INJECTION, POWDER, FOR SOLUTION INTRAVENOUS at 04:02

## 2024-02-20 RX ADMIN — HEPARIN SODIUM 7500 UNITS: 5000 INJECTION INTRAVENOUS; SUBCUTANEOUS at 05:02

## 2024-02-20 RX ADMIN — INSULIN ASPART 6 UNITS: 100 INJECTION, SOLUTION INTRAVENOUS; SUBCUTANEOUS at 08:02

## 2024-02-20 NOTE — PROGRESS NOTES
[facility]  Nephrology  Progress Note    Patient Name: Sarah Saravia  MRN: [id number]  Admission Date: 1/29/2024  Hospital Length of Stay: 22 days  Attending Provider: Steve Cole MD   Primary Care Physician: Patricia [facility]  Principal Problem:Ayaz's gangrene    Subjective:     HPI: Sarah Saravia is a 53 year old female with a past medical history of obesity (BMI 53) admitted with N/V and R groin wound found to be in DKA at OSH.  She underwent a CT abdomen and pelvis that showed extensive soft tissue air throughout the right groin and inguinal region and extending to involve the right lower quadrant anterior abdominal wall with extensive soft tissue air also seen involving the proximal medial aspect of the right thigh, concerning for gas-forming infection. She is s/p OR debridement for necrotizing fascitis on 1/29/24 and take back for 1/31/24. Right groin tissue growing proteus, susceptibilities still pending. Currently on meropenem and clindamycin which was d/c'd on 1/31/24. While at OSH pt developed acute respiratory failure requiring intubation. Nephrology was consulted for worsening alvarado in the setting of lactic acidosis and septic shock likely ATN, sCr elevated at 3.8 on admit.  OR today for debridement with possible closure and wound vac placement. Last HD 2/1. Net -1.3L. Electrolytes stable. Nephrology consulted for ALVARADO.         Interval History:     No acute events overnight. UOP stable with IV lasix. Scr stable. Plan for trach/peg on Thursday. Remains intubated, mentation unchanged.     Review of patient's allergies indicates:  No Known Allergies  Current Facility-Administered Medications   Medication Frequency    0.9%  NaCl infusion (CRRT USE ONLY) Continuous    0.9%  NaCl infusion (for blood administration) Q24H PRN    0.9%  NaCl infusion PRN    acetaminophen tablet 650 mg Q4H PRN    albuterol-ipratropium 2.5 mg-0.5 mg/3 mL nebulizer solution 3 mL  Q4H PRN    amLODIPine tablet 10 mg Daily    carvediloL tablet 25 mg BID    dextrose 10 % infusion Continuous PRN    dextrose 10% bolus 125 mL 125 mL PRN    dextrose 10% bolus 250 mL 250 mL PRN    furosemide injection 120 mg TID    glucagon (human recombinant) injection 1 mg PRN    glucose chewable tablet 16 g PRN    glucose chewable tablet 24 g PRN    heparin (porcine) injection 7,500 Units Q8H    hydrALAZINE injection 10 mg Q4H PRN    insulin aspart U-100 pen 0-10 Units Q4H PRN    insulin aspart U-100 pen 6 Units Q4H    insulin detemir U-100 (Levemir) pen 14 Units Daily    labetalol 20 mg/4 mL (5 mg/mL) IV syring Q6H PRN    magnesium sulfate 2g in water 50mL IVPB (premix) PRN    naloxone 0.4 mg/mL injection 0.02 mg PRN    ondansetron injection 4 mg Q6H PRN    oxyCODONE 5 mg/5 mL solution 5 mg Q6H PRN    piperacillin-tazobactam (ZOSYN) 4.5 g in dextrose 5 % in water (D5W) 100 mL IVPB (MB+) Q8H    prochlorperazine injection Soln 5 mg Q6H PRN    psyllium husk (aspartame) 3.4 gram packet 1 packet BID    sevelamer carbonate pwpk 1.6 g TID WM    sodium chloride 0.9% bolus 250 mL 250 mL PRN    sodium chloride 0.9% bolus 250 mL 250 mL PRN    sodium chloride 0.9% flush 10 mL PRN    sodium phosphate 20.01 mmol in dextrose 5 % (D5W) 250 mL IVPB PRN    sodium phosphate 30 mmol in dextrose 5 % (D5W) 250 mL IVPB PRN    sodium phosphate 39.99 mmol in dextrose 5 % (D5W) 250 mL IVPB PRN       Objective:     Vital Signs (Most Recent):  Temp: 98.7 °F (37.1 °C) (02/20/24 0800)  Pulse: 86 (02/20/24 1217)  Resp: (!) 24 (02/20/24 1217)  BP: 124/64 (02/20/24 1100)  SpO2: 100 % (02/20/24 1217) Vital Signs (24h Range):  Temp:  [98.7 °F (37.1 °C)-100.6 °F (38.1 °C)] 98.7 °F (37.1 °C)  Pulse:  [78-86] 86  Resp:  [13-27] 24  SpO2:  [100 %] 100 %  BP: (119-148)/(61-73) 124/64     Weight: (!) 148.8 kg (328 lb) (02/20/24 0300)  Body mass index is 54.58 kg/m².  Body surface area is 2.61 meters squared.    I/O last 3 completed shifts:  In: 1273.8  [I.V.:12.6; NG/GT:665; IV Piggyback:596.2]  Out: 3960 [Urine:3160; Drains:350; Other:450]     Physical Exam  Vitals and nursing note reviewed.   Constitutional:       Appearance: She is well-developed. She is ill-appearing.      Interventions: She is intubated.   HENT:      Head: Normocephalic and atraumatic.      Right Ear: External ear normal.      Left Ear: External ear normal.   Eyes:      General: No scleral icterus.        Right eye: No discharge.         Left eye: No discharge.      Conjunctiva/sclera: Conjunctivae normal.   Cardiovascular:      Rate and Rhythm: Normal rate and regular rhythm.      Heart sounds: Normal heart sounds. No murmur heard.     No friction rub. No gallop.   Pulmonary:      Effort: Pulmonary effort is normal. No respiratory distress. She is intubated.      Breath sounds: No stridor. No wheezing, rhonchi or rales.   Abdominal:      General: Bowel sounds are normal.   Musculoskeletal:         General: No tenderness.   Skin:     General: Skin is warm and dry.      Comments: Wound VAC on right groin area   Neurological:      Mental Status: She is lethargic.      Comments: Does not respond to verbal, tactile or painful stimuli.           Significant Labs:  All labs within the past 24 hours have been reviewed.     Significant Imaging:  Labs: Reviewed  Assessment/Plan:     Renal/  * Ayaz's gangrene  - management per primary     ALVARADO (acute kidney injury)  Transfer from [facility]. Admitted for fourniers gangrene. Initiated HD on 1/31. ALVARADO 2/2 ATN in setting of septic shock. Arrived with CR 3.8. Unknown baseline. S/P OR debridement with possible closure and wound vac placement     ALVARADO most likley ATN in setting of septic shock     Plan/Recommendation  - Will plan for nocturnal 8hrs SLED today. No UF.   - Continue IV lasix 120mg TID.   -Daily RFP   -Strict I&Os  -Avoid nephrotoxins, if possible     ID  Severe sepsis  -        Thank you for your consult. I will follow-up with patient.  Please contact us if you have any additional questions.    Case discussed with attending. Attestation to follow.       Antione Hazel DO  Nephrology  [facility]

## 2024-02-20 NOTE — SUBJECTIVE & OBJECTIVE
Interval History:     No acute events overnight. UOP stable with IV lasix. Scr stable. Plan for trach/peg on Thursday. Remains intubated, mentation unchanged.     Review of patient's allergies indicates:  No Known Allergies  Current Facility-Administered Medications   Medication Frequency    0.9%  NaCl infusion (CRRT USE ONLY) Continuous    0.9%  NaCl infusion (for blood administration) Q24H PRN    0.9%  NaCl infusion PRN    acetaminophen tablet 650 mg Q4H PRN    albuterol-ipratropium 2.5 mg-0.5 mg/3 mL nebulizer solution 3 mL Q4H PRN    amLODIPine tablet 10 mg Daily    carvediloL tablet 25 mg BID    dextrose 10 % infusion Continuous PRN    dextrose 10% bolus 125 mL 125 mL PRN    dextrose 10% bolus 250 mL 250 mL PRN    furosemide injection 120 mg TID    glucagon (human recombinant) injection 1 mg PRN    glucose chewable tablet 16 g PRN    glucose chewable tablet 24 g PRN    heparin (porcine) injection 7,500 Units Q8H    hydrALAZINE injection 10 mg Q4H PRN    insulin aspart U-100 pen 0-10 Units Q4H PRN    insulin aspart U-100 pen 6 Units Q4H    insulin detemir U-100 (Levemir) pen 14 Units Daily    labetalol 20 mg/4 mL (5 mg/mL) IV syring Q6H PRN    magnesium sulfate 2g in water 50mL IVPB (premix) PRN    naloxone 0.4 mg/mL injection 0.02 mg PRN    ondansetron injection 4 mg Q6H PRN    oxyCODONE 5 mg/5 mL solution 5 mg Q6H PRN    piperacillin-tazobactam (ZOSYN) 4.5 g in dextrose 5 % in water (D5W) 100 mL IVPB (MB+) Q8H    prochlorperazine injection Soln 5 mg Q6H PRN    psyllium husk (aspartame) 3.4 gram packet 1 packet BID    sevelamer carbonate pwpk 1.6 g TID WM    sodium chloride 0.9% bolus 250 mL 250 mL PRN    sodium chloride 0.9% bolus 250 mL 250 mL PRN    sodium chloride 0.9% flush 10 mL PRN    sodium phosphate 20.01 mmol in dextrose 5 % (D5W) 250 mL IVPB PRN    sodium phosphate 30 mmol in dextrose 5 % (D5W) 250 mL IVPB PRN    sodium phosphate 39.99 mmol in dextrose 5 % (D5W) 250 mL IVPB PRN       Objective:      Vital Signs (Most Recent):  Temp: 98.7 °F (37.1 °C) (02/20/24 0800)  Pulse: 86 (02/20/24 1217)  Resp: (!) 24 (02/20/24 1217)  BP: 124/64 (02/20/24 1100)  SpO2: 100 % (02/20/24 1217) Vital Signs (24h Range):  Temp:  [98.7 °F (37.1 °C)-100.6 °F (38.1 °C)] 98.7 °F (37.1 °C)  Pulse:  [78-86] 86  Resp:  [13-27] 24  SpO2:  [100 %] 100 %  BP: (119-148)/(61-73) 124/64     Weight: (!) 148.8 kg (328 lb) (02/20/24 0300)  Body mass index is 54.58 kg/m².  Body surface area is 2.61 meters squared.    I/O last 3 completed shifts:  In: 1273.8 [I.V.:12.6; NG/GT:665; IV Piggyback:596.2]  Out: 3960 [Urine:3160; Drains:350; Other:450]     Physical Exam  Vitals and nursing note reviewed.   Constitutional:       Appearance: She is well-developed. She is ill-appearing.      Interventions: She is intubated.   HENT:      Head: Normocephalic and atraumatic.      Right Ear: External ear normal.      Left Ear: External ear normal.   Eyes:      General: No scleral icterus.        Right eye: No discharge.         Left eye: No discharge.      Conjunctiva/sclera: Conjunctivae normal.   Cardiovascular:      Rate and Rhythm: Normal rate and regular rhythm.      Heart sounds: Normal heart sounds. No murmur heard.     No friction rub. No gallop.   Pulmonary:      Effort: Pulmonary effort is normal. No respiratory distress. She is intubated.      Breath sounds: No stridor. No wheezing, rhonchi or rales.   Abdominal:      General: Bowel sounds are normal.   Musculoskeletal:         General: No tenderness.   Skin:     General: Skin is warm and dry.      Comments: Wound VAC on right groin area   Neurological:      Mental Status: She is lethargic.      Comments: Does not respond to verbal, tactile or painful stimuli.           Significant Labs:  All labs within the past 24 hours have been reviewed.     Significant Imaging:  Labs: Reviewed

## 2024-02-20 NOTE — ASSESSMENT & PLAN NOTE
BG goal 140-180  No previous hx of T2DM per family at bedside. A1c of 10.4. DKA at OSH. BG stable on regimen.      Plan:  - Continue Levemir 14 units daily.  - Continue Novolog 6 units q 4 hrs while on tube feeding (HOLD if tube feeding is stopped or BG < 100)   - Continue Moderate Dose Correction Scale  - BG monitoring q 4 hrs while NPO /TF    ** Please call Endocrine for any BG related issues **      Discharge plans: TBD    Lab Results   Component Value Date    HGBA1C 10.4 (H) 01/30/2024

## 2024-02-20 NOTE — PLAN OF CARE
02/20/24 1127   Post-Acute Status   Post-Acute Authorization Placement   Post-Acute Placement Status Patient List Provided     The SW contacted the patient's daughter by phone to discuss d/c recs for LTACH placement. The SW discussed with the patient's daughter LTACH admission criteria. The SW emailed the patient's daughter a list of LTACH.      The SW will continue to follow.       Jahaira Mckeon LMSW  Case Management [facility]

## 2024-02-20 NOTE — ASSESSMENT & PLAN NOTE
53 y.o. 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     - Last [place] change 2/19  - Will discuss plans for trach/PEG in the setting of fevers. No fevers in 24 hours. Likely plan for Thursday, Will obtain consent from family.  Will also plan to attempt closure of her posterior wound at that time.  - LP 2/16 - Specimen for cultures is missing.   - ID consulted for urine cultures - recommend broadening to zosyn    - UA growing Burkholderia species and Chryseibacterium Indologenes, recommend IV zosyn with stop date of 2/22  - Palliative following given overall poor prognosis, daughter would like everything done  - Stable on spontaneous   - Okay for DVT ppx   - Remainder of care per SICU

## 2024-02-20 NOTE — SUBJECTIVE & OBJECTIVE
"Interval HPI:   Overnight events: Remains intubated in SICU. BG well controlled and within goal ranges on current SQ insulin regimen. Diet NPO    Eating:   NPO  Nausea: No  Hypoglycemia and intervention: No  Fever: No  TPN and/or TF: Yes  If yes, type of TF/TPN and rate: TFs at 30 ml/hr     /64   Pulse 86   Temp 98.7 °F (37.1 °C) (Axillary)   Resp (!) 24   Ht 5' 5" (1.651 m)   Wt (!) 148.8 kg (328 lb)   LMP 01/01/2024 (Approximate) Comment: tubal ligation  SpO2 100%   BMI 54.58 kg/m²     Labs Reviewed and Include    Recent Labs   Lab 02/20/24  0309   *   CALCIUM 8.9   ALBUMIN 2.0*   PROT 7.3      K 4.6   CO2 22*      BUN 53*   CREATININE 3.9*   ALKPHOS 120   ALT 13   AST 31   BILITOT 0.2     Lab Results   Component Value Date    WBC 8.63 02/20/2024    HGB 7.4 (L) 02/20/2024    HCT 24.2 (L) 02/20/2024    MCV 88 02/20/2024     02/20/2024     No results for input(s): "TSH", "FREET4" in the last 168 hours.  Lab Results   Component Value Date    HGBA1C 10.4 (H) 01/30/2024       Nutritional status:   Body mass index is 54.58 kg/m².  Lab Results   Component Value Date    ALBUMIN 2.0 (L) 02/20/2024    ALBUMIN 2.0 (L) 02/19/2024    ALBUMIN 1.9 (L) 02/19/2024     No results found for: "PREALBUMIN"    Estimated Creatinine Clearance: 24.7 mL/min (A) (based on SCr of 3.9 mg/dL (H)).    Accu-Checks  Recent Labs     02/18/24 2033 02/19/24  0015 02/19/24  0333 02/19/24  0741 02/19/24  1123 02/19/24  1548 02/19/24 2006 02/20/24  0021 02/20/24  0313 02/20/24  0801   POCTGLUCOSE 142* 131* 127* 112* 115* 89 107 153* 146* 136*       Current Medications and/or Treatments Impacting Glycemic Control  Immunotherapy:    Immunosuppressants       None          Steroids:   Hormones (From admission, onward)      None          Pressors:    Autonomic Drugs (From admission, onward)      None          Hyperglycemia/Diabetes Medications:   Antihyperglycemics (From admission, onward)      Start     Stop Route " Frequency Ordered    02/13/24 0915  insulin detemir U-100 (Levemir) pen 14 Units         -- SubQ Daily 02/13/24 0906    02/09/24 1200  insulin aspart U-100 pen 6 Units         -- SubQ Every 4 hours 02/09/24 0901    02/03/24 1010  insulin aspart U-100 pen 0-10 Units         -- SubQ Every 4 hours PRN 02/03/24 0911

## 2024-02-20 NOTE — ASSESSMENT & PLAN NOTE
Lab Results   Component Value Date    CREATININE 3.9 (H) 02/20/2024     Avoid insulin stacking  Titrate insulin slowly

## 2024-02-20 NOTE — PLAN OF CARE
SICU PLAN OF CARE    Dx: Ayaz's gangrene    Goals of Care: Map > 65  Systolic <170  Accuchecks Q4    Vital Signs (last 12 hours):   Temp:  [99.1 °F (37.3 °C)-100.6 °F (38.1 °C)]   Pulse:  [77-86]   Resp:  [12-21]   BP: (119-141)/(61-70)   SpO2:  [100 %]      Neuro: Arouses to Pain, Withdraws to noxious stimuli , and Intubated     Cardiac: NSR    Respiratory: Ventilator; Mode: Pressure Control , 30% FiO2, 8 PEEP    Gtts: I=Os    Urine Output: Urethral Catheter  910 mL/shift    Drains: Wound Vac, total output 200mL/shift    Diet: Tube Feeds 30cc/hr     Labs/Accuchecks: Accucheck Q4 107-153    Skin:   Patient turned q2h,  mattress inflated, and bed working correctly. SCDS, foams and heel boots in place.    Shift Events:  No acute events overnight. Plan for possible Trach and Peg for Thursday.  See flowsheet for further assessment/details.  Family updated on current condition/plan of care, questions answered, and emotional support provided.  MD updated on current condition, vitals, labs, and gtts.

## 2024-02-20 NOTE — PROGRESS NOTES
[facility] - Surgical Intensive Care  Critical Care - Surgery  Progress Note    Patient Name: Sarah Saravia  MRN: [id number]  Admission Date: 1/29/2024  Hospital Length of Stay: 22 days  Code Status: Full Code  Attending Provider: Steve Cole MD  [facility]   Principal Problem: Ayaz's gangrene    Subjective:     Interval History/Significant Events: wound vac exchange yesterday. CSF cultures returned negative for organism growth. Palliative touched base and family wants full measures. Fever 100.6. Trach/PEG Thursday.    Follow-up For: Procedure(s) (LRB):  Lumbar Puncture (N/A)    Post-Operative Day: 4 Days Post-Op    Objective:     Vital Signs (Most Recent):  Temp: 99.7 °F (37.6 °C) (02/20/24 0300)  Pulse: 81 (02/20/24 0645)  Resp: 17 (02/20/24 0645)  BP: 138/71 (02/20/24 0630)  SpO2: 100 % (02/20/24 0645) Vital Signs (24h Range):  Temp:  [98.3 °F (36.8 °C)-100.6 °F (38.1 °C)] 99.7 °F (37.6 °C)  Pulse:  [77-91] 81  Resp:  [12-21] 17  SpO2:  [99 %-100 %] 100 %  BP: (119-148)/(60-76) 138/71     Weight: (!) 148.8 kg (328 lb)  Body mass index is 54.58 kg/m².      Intake/Output Summary (Last 24 hours) at 2/20/2024 0654  Last data filed at 2/20/2024 0600  Gross per 24 hour   Intake 732.61 ml   Output 2435 ml   Net -1702.39 ml          Physical Exam   Vitals and nursing note reviewed.   Constitutional:       General: She is not in acute distress.     Appearance: She is ill-appearing.   HENT:      Head: Normocephalic and atraumatic.   Eyes:      Extraocular Movements: Extraocular movements intact.      Conjunctiva/sclera: Conjunctivae normal.   Neck:      Comments: Left IJ Trialysis catheter  Cardiovascular:      Rate and Rhythm: Normal rate and regular rhythm.   Pulmonary:      Effort: Pulmonary effort is normal.      Comments: Intubated, on spontaneous     Abdominal:      General: There is no distension.      Palpations: Abdomen is soft.      Tenderness: There is no  abdominal tenderness.   Genitourinary:     Comments: Glynn in place     Skin:     General: Skin is warm and dry.      Comments: Wound vac in place to R thigh/groin to suction   Neurological:      General: No focal deficit present.      Mental Status: She is oriented to person, place, and time.   Psychiatric:         Mood and Affect: Mood normal.         Behavior: Behavior normal.       Vents:  Vent Mode: Spont (02/20/24 0508)  Set Rate: 18 BPM (02/06/24 0349)  Vt Set: 420 mL (02/06/24 0349)  Pressure Support: 10 cmH20 (02/20/24 0508)  PEEP/CPAP: 5 cmH20 (02/20/24 0508)  Oxygen Concentration (%): 30 (02/20/24 0645)  Peak Airway Pressure: 16 cmH20 (02/20/24 0508)  Plateau Pressure: 15 cmH20 (02/20/24 0508)  Total Ve: 7.38 L/m (02/20/24 0508)  Negative Inspiratory Force (cm H2O): 0 (02/20/24 0508)  F/VT Ratio<105 (RSBI): (!) 49.05 (02/20/24 0508)    Lines/Drains/Airways       Central Venous Catheter Line  Duration             Trialysis (Dialysis) Catheter 02/11/24 2303 left internal jugular 8 days              Drain  Duration                  NG/OG Tube 02/06/24 1030 Center mouth 13 days         Urethral Catheter 02/16/24 1215 3 days              Airway  Duration                  Airway - Non-Surgical 01/31/24 1020 19 days              Peripheral Intravenous Line  Duration                  Peripheral IV - Single Lumen 02/13/24 1132 18 G;1 3/4 in Right Forearm 6 days                    Significant Labs:    CBC/Anemia Profile:  Recent Labs   Lab 02/19/24  0159 02/20/24  0309   WBC 9.22 8.63   HGB 7.5* 7.4*   HCT 24.4* 24.2*    355   MCV 91 88   RDW 17.5* 17.7*        Chemistries:  Recent Labs   Lab 02/19/24  0159 02/19/24  1311 02/19/24  2209 02/20/24  0309   * 137 139 139   K 4.6 4.6 4.8 4.6    104 104 104   CO2 23 23 26 22*   BUN 49* 52* 51* 53*   CREATININE 3.6* 3.6* 3.8* 3.9*   CALCIUM 8.6* 9.0 9.4 8.9   ALBUMIN 1.9* 1.9* 2.0* 2.0*   PROT 7.2  --   --  7.3   BILITOT 0.2  --   --  0.2   ALKPHOS 123   --   --  120   ALT 11  --   --  13   AST 30  --   --  31   MG 1.8  --  2.2 2.2   PHOS 6.0* 6.5* 6.6* 6.6*           Significant Imaging:  N/A  Assessment/Plan:     Renal/  * Ayaz's gangrene    Neuro/Psych:   #Acute Encephalopathy  CTH 2/3 with scattered hypoattenuation likely representing age indeterminate infarcts. EEG findings consistent with moderate-severe encephalopathy. MRI 2/6: Several scattered punctate acute infarcts throughout the bilateral frontal lobes, centrum semiovale, basal ganglia, corpus callosum, and cerebellar hemispheres.  CTA 2/6: Atherosclerotic plaquing of the carotid bifurcations and proximal ICAs with less than 50% proximal ICA stenosis by NASCET criteria . Repeat CTH and MRI/MRA unchanged from prior exams. S/p multiple wound vac exchanges. Palliative onboard with family wanting full measures.    Neuro/Psych  Repeat CTH and MRI/MRA stable from initial reads. LP 2/16. Elevated WBCs and protein consistent with bacterial meningitis.  -- Sedation: None  -- Pain: kermit tylenol, dialudid and oxy prn  -- GCS 4T: E2, V1T, M1; exam stable  -- Neurology following; appreciate recs  -- Neurovascular following previously; signed off 2/17  -- Palliative following; GOC conversation 2/7; appreciate recs  -- CSF culture with no growth             Cards:   -- HDS  -- goal SBP < 180, MAP >65  -- normotensive; hydralazine and labetalol prns  -- Continue amlodipine 10mg daily; coreg 25mg BID      Pulm:   #Acute Respiratory Failure  -- Intubated on spontaneous , Pressure support.  -- Goal O2 sat > 90%  -- Trach/PEG this Thursday 2/22      Renal:  #ALVARADO on CKD  #ATN  -- 2/13 LIJ trialysis  -- Nephrology following; appreciate recs  -- increased phos, on sevelamer, otherwise stable electrolytes.  -- Continue lasix 120mg IV tid    Recent Labs   Lab 02/19/24  1311 02/19/24  2209 02/20/24  0309   BUN 52* 51* 53*   CREATININE 3.6* 3.8* 3.9*        FEN / GI:   -- Daily CMPs  -- NGT in place   -- Replace lytes as  needed  -- Nutrition: NPO, TF (Novasource Renal) at goal 30 cc/hr.    -- GI PPX   -- Nutrition following; appreciate recs  -- Continue psyllium and sevelamer      ID:    #Ayaz's gangrene  s/p wound vac exchange/ debridement x4 for nec fascitis. R groin tissue with proteus, sensitive to ceftriaxone. Growing bacteroids as well. Urine cx 2/18 growing C. Indologens.  -- Abx: transitioned to zosyn 2/19 from ceftriaxone and flagyl. Same EOT 2/22   -- ID following ; appreciate recs         Heme/Onc:  #Anemia  -- Daily CBC  -- Heparin DVT ppx  -- Transfuse if Hgb <7     Endo:   #IDDM  Initially presented to OSH with DKA with latest A1C 10.4 AG Gap resolved  Placed on insulin gtt 2/3 and dced 2/12.    - q4h BG monitoring while NPO  - Detemir 14 units daily  - Novolog 6units q4h while on TFs  - Moderate dose SSI PRN  - Endocrine following, appreciate recs      PPx:   Feeding: Tube Feeds  Analgesia/Sedation: See above  Thromboembolic prevention: SQH   HOB >30: Y  Stress Ulcer ppx: Famotidine  Glucose control: Goal 140-180 g/dl, kermit detemir and novolog, SSI, Endo following  Bowel reg: psyllium  Invasive Lines/Drains/Airway: Glynn, ETT, LIJ Trialysis, PIV x2      Dispo/Code Status/Palliative:   -- SICU / Full Code   -- PEG/Trach Thursday         David Stokes MD  Critical Care - Surgery  [facility] - Surgical Intensive Care

## 2024-02-20 NOTE — SUBJECTIVE & OBJECTIVE
Interval History/Significant Events: wound vac exchange yesterday. CSF cultures returned negative for organism growth. Palliative touched base and family wants full measures. Fever 100.6. Trach/PEG Thursday.    Follow-up For: Procedure(s) (LRB):  Lumbar Puncture (N/A)    Post-Operative Day: 4 Days Post-Op    Objective:     Vital Signs (Most Recent):  Temp: 99.7 °F (37.6 °C) (02/20/24 0300)  Pulse: 81 (02/20/24 0645)  Resp: 17 (02/20/24 0645)  BP: 138/71 (02/20/24 0630)  SpO2: 100 % (02/20/24 0645) Vital Signs (24h Range):  Temp:  [98.3 °F (36.8 °C)-100.6 °F (38.1 °C)] 99.7 °F (37.6 °C)  Pulse:  [77-91] 81  Resp:  [12-21] 17  SpO2:  [99 %-100 %] 100 %  BP: (119-148)/(60-76) 138/71     Weight: (!) 148.8 kg (328 lb)  Body mass index is 54.58 kg/m².      Intake/Output Summary (Last 24 hours) at 2/20/2024 0654  Last data filed at 2/20/2024 0600  Gross per 24 hour   Intake 732.61 ml   Output 2435 ml   Net -1702.39 ml          Physical Exam   Vitals and nursing note reviewed.   Constitutional:       General: She is not in acute distress.     Appearance: She is ill-appearing.   HENT:      Head: Normocephalic and atraumatic.   Eyes:      Extraocular Movements: Extraocular movements intact.      Conjunctiva/sclera: Conjunctivae normal.   Neck:      Comments: Left IJ Trialysis catheter  Cardiovascular:      Rate and Rhythm: Normal rate and regular rhythm.   Pulmonary:      Effort: Pulmonary effort is normal.      Comments: Intubated, on spontaneous     Abdominal:      General: There is no distension.      Palpations: Abdomen is soft.      Tenderness: There is no abdominal tenderness.   Genitourinary:     Comments: Glynn in place     Skin:     General: Skin is warm and dry.      Comments: Wound vac in place to R thigh/groin to suction   Neurological:      General: No focal deficit present.      Mental Status: She is oriented to person, place, and time.   Psychiatric:         Mood and Affect: Mood normal.         Behavior:  Behavior normal.       Vents:  Vent Mode: Spont (02/20/24 0508)  Set Rate: 18 BPM (02/06/24 0349)  Vt Set: 420 mL (02/06/24 0349)  Pressure Support: 10 cmH20 (02/20/24 0508)  PEEP/CPAP: 5 cmH20 (02/20/24 0508)  Oxygen Concentration (%): 30 (02/20/24 0645)  Peak Airway Pressure: 16 cmH20 (02/20/24 0508)  Plateau Pressure: 15 cmH20 (02/20/24 0508)  Total Ve: 7.38 L/m (02/20/24 0508)  Negative Inspiratory Force (cm H2O): 0 (02/20/24 0508)  F/VT Ratio<105 (RSBI): (!) 49.05 (02/20/24 0508)    Lines/Drains/Airways       Central Venous Catheter Line  Duration             Trialysis (Dialysis) Catheter 02/11/24 2303 left internal jugular 8 days              Drain  Duration                  NG/OG Tube 02/06/24 1030 Center mouth 13 days         Urethral Catheter 02/16/24 1215 3 days              Airway  Duration                  Airway - Non-Surgical 01/31/24 1020 19 days              Peripheral Intravenous Line  Duration                  Peripheral IV - Single Lumen 02/13/24 1132 18 G;1 3/4 in Right Forearm 6 days                    Significant Labs:    CBC/Anemia Profile:  Recent Labs   Lab 02/19/24  0159 02/20/24  0309   WBC 9.22 8.63   HGB 7.5* 7.4*   HCT 24.4* 24.2*    355   MCV 91 88   RDW 17.5* 17.7*        Chemistries:  Recent Labs   Lab 02/19/24  0159 02/19/24  1311 02/19/24  2209 02/20/24  0309   * 137 139 139   K 4.6 4.6 4.8 4.6    104 104 104   CO2 23 23 26 22*   BUN 49* 52* 51* 53*   CREATININE 3.6* 3.6* 3.8* 3.9*   CALCIUM 8.6* 9.0 9.4 8.9   ALBUMIN 1.9* 1.9* 2.0* 2.0*   PROT 7.2  --   --  7.3   BILITOT 0.2  --   --  0.2   ALKPHOS 123  --   --  120   ALT 11  --   --  13   AST 30  --   --  31   MG 1.8  --  2.2 2.2   PHOS 6.0* 6.5* 6.6* 6.6*           Significant Imaging:  N/A

## 2024-02-20 NOTE — PROGRESS NOTES
"[facility]  Endocrinology  Progress Note    Admit Date: 1/29/2024     Reason for Consult: Management of T2DM, Hyperglycemia     Surgical Procedure and Date: n/a    Diabetes diagnosis year: new onset     Home Diabetes Medications:  none per chart review and family present at bedside     Lab Results   Component Value Date    HGBA1C 10.4 (H) 01/30/2024       Diabetes Complications include:     Hyperglycemia, DKA at OSH     Complicating diabetes co morbidities:   Obesity       HPI:   Patient is a 53 y.o. female with a diagnosis of obesity (BMI 53) admitted with N/V and R groin wound found to be in DKA at OSH. 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 While at OSH pt developed acute respiratory failure requiring intubation. Pulmonology was consulted for ventilator management and critical illness. Nephrology was consulted for worsening vishal in the setting of lactic acidosis and septic shock likely ATN, sCr elevated at 3.8 on admit now 4.0 post HD. Pt being admitted to SICU for surgical critical care management. Immediate plans include hemodynamic stabilization, weaning of respiratory support, and RRT.  Endocrinology consulted for management of T2DM.                 Interval HPI:   Overnight events: Remains intubated in SICU. BG well controlled and within goal ranges on current SQ insulin regimen. Diet NPO    Eating:   NPO  Nausea: No  Hypoglycemia and intervention: No  Fever: No  TPN and/or TF: Yes  If yes, type of TF/TPN and rate: TFs at 30 ml/hr     /64   Pulse 86   Temp 98.7 °F (37.1 °C) (Axillary)   Resp (!) 24   Ht 5' 5" (1.651 m)   Wt (!) 148.8 kg (328 lb)   LMP 01/01/2024 (Approximate) Comment: tubal ligation  SpO2 100%   BMI 54.58 kg/m²     Labs Reviewed and Include    Recent Labs   Lab 02/20/24  0309   *   CALCIUM 8.9   ALBUMIN 2.0*   PROT 7.3      K 4.6   CO2 22* " "     BUN 53*   CREATININE 3.9*   ALKPHOS 120   ALT 13   AST 31   BILITOT 0.2     Lab Results   Component Value Date    WBC 8.63 02/20/2024    HGB 7.4 (L) 02/20/2024    HCT 24.2 (L) 02/20/2024    MCV 88 02/20/2024     02/20/2024     No results for input(s): "TSH", "FREET4" in the last 168 hours.  Lab Results   Component Value Date    HGBA1C 10.4 (H) 01/30/2024       Nutritional status:   Body mass index is 54.58 kg/m².  Lab Results   Component Value Date    ALBUMIN 2.0 (L) 02/20/2024    ALBUMIN 2.0 (L) 02/19/2024    ALBUMIN 1.9 (L) 02/19/2024     No results found for: "PREALBUMIN"    Estimated Creatinine Clearance: 24.7 mL/min (A) (based on SCr of 3.9 mg/dL (H)).    Accu-Checks  Recent Labs     02/18/24 2033 02/19/24  0015 02/19/24  0333 02/19/24  0741 02/19/24  1123 02/19/24  1548 02/19/24  2006 02/20/24  0021 02/20/24  0313 02/20/24  0801   POCTGLUCOSE 142* 131* 127* 112* 115* 89 107 153* 146* 136*       Current Medications and/or Treatments Impacting Glycemic Control  Immunotherapy:    Immunosuppressants       None          Steroids:   Hormones (From admission, onward)      None          Pressors:    Autonomic Drugs (From admission, onward)      None          Hyperglycemia/Diabetes Medications:   Antihyperglycemics (From admission, onward)      Start     Stop Route Frequency Ordered    02/13/24 0915  insulin detemir U-100 (Levemir) pen 14 Units         -- SubQ Daily 02/13/24 0906    02/09/24 1200  insulin aspart U-100 pen 6 Units         -- SubQ Every 4 hours 02/09/24 0901    02/03/24 1010  insulin aspart U-100 pen 0-10 Units         -- SubQ Every 4 hours PRN 02/03/24 0911            ASSESSMENT and PLAN    Renal/  * Ayaz's gangrene  Managed per primary team  Optimize BG control        ALVARADO (acute kidney injury)  Lab Results   Component Value Date    CREATININE 3.9 (H) 02/20/2024     Avoid insulin stacking  Titrate insulin slowly       Endocrine  Morbid (severe) obesity due to excess " calories  Body mass index is 54.58 kg/m².  May increase insulin resistance.         New onset type 2 diabetes mellitus  BG goal 140-180  No previous hx of T2DM per family at bedside. A1c of 10.4. DKA at OSH. BG stable on regimen.      Plan:  - Continue Levemir 14 units daily.  - Continue Novolog 6 units q 4 hrs while on tube feeding (HOLD if tube feeding is stopped or BG < 100)   - Continue Moderate Dose Correction Scale  - BG monitoring q 4 hrs while NPO /TF    ** Please call Endocrine for any BG related issues **      Discharge plans: TBD    Lab Results   Component Value Date    HGBA1C 10.4 (H) 01/30/2024             Zoie Muñiz NP  Endocrinology  [facility]

## 2024-02-20 NOTE — PROGRESS NOTES
02/20/24 1750   Treatment   Treatment Type SLED   Treatment Status New start   Dialysis Machine Number [id number]   Dialyzer Time (hours) 0   BVP (Liters) 0 L   Solutions Labeled and Current  Yes   Access Temporary Cath;Left;IJ   Catheter Dressing Intact  Yes   Alarms Engaged Yes   CRRT Comments sled initiated as ordered   Prescription   Time (Hours) 8   Dialysate K + (mEq/L) 4   Dialysate CA + (mEq/L) 2.25   Dialysate HCO3 - (Bicarb) (mEq/L) 30   Dialysate Na + (mEq/L) 140   Cartridge Type Other  (r300)   Dialysate Flow Rate (mL/min) 200   UF Goal Rate 200 mL/hr   CRRT Hourly Documentation   Blood Flow (mL/min) 200   UF Rate 200 cc/hr   Arterial Pressure (mmHg) -80 mmHg   Venous Pressure (mmHg) 60 mmHg   Effluent Pressure (EP) (mmHg) 10 mmHg   Total UF (Hourly Cleared) (mL) 0     SLED initiated as ordered. LIJ CVC aspirated, flushed, and accessed using aseptic technique. Lines connected and secured.

## 2024-02-20 NOTE — SUBJECTIVE & OBJECTIVE
Interval History: ". Infectious Diseases re-consulted for urinary culture positivity. Transitioned to Zosyn by SICU team. Low grade fever overnight.    Review of Systems   Unable to perform ROS: Intubated     Objective:     Vital Signs (Most Recent):  Temp: 98.7 °F (37.1 °C) (02/20/24 0800)  Pulse: 82 (02/20/24 0900)  Resp: (!) 21 (02/20/24 0900)  BP: 121/66 (02/20/24 0900)  SpO2: 100 % (02/20/24 0900) Vital Signs (24h Range):  Temp:  [98.7 °F (37.1 °C)-100.6 °F (38.1 °C)] 98.7 °F (37.1 °C)  Pulse:  [77-86] 82  Resp:  [12-22] 21  SpO2:  [100 %] 100 %  BP: (119-148)/(61-73) 121/66     Weight: (!) 148.8 kg (328 lb)  Body mass index is 54.58 kg/m².    Estimated Creatinine Clearance: 24.7 mL/min (A) (based on SCr of 3.9 mg/dL (H)).     Physical Exam  Vitals and nursing note reviewed.   Constitutional:       Appearance: She is well-developed. She is ill-appearing.      Interventions: She is intubated.   HENT:      Head: Normocephalic and atraumatic.      Right Ear: External ear normal.      Left Ear: External ear normal.   Eyes:      General: No scleral icterus.        Right eye: No discharge.         Left eye: No discharge.      Conjunctiva/sclera: Conjunctivae normal.   Cardiovascular:      Rate and Rhythm: Normal rate and regular rhythm.      Heart sounds: Normal heart sounds. No murmur heard.     No friction rub. No gallop.   Pulmonary:      Effort: Pulmonary effort is normal. No respiratory distress. She is intubated.      Breath sounds: No stridor. No wheezing, rhonchi or rales.   Abdominal:      General: Bowel sounds are normal.   Musculoskeletal:         General: No tenderness.   Skin:     General: Skin is warm and dry.      Comments: Wound VAC on right groin area   Neurological:      Mental Status: She is lethargic.      Comments: Does not respond to verbal, tactile or painful stimuli.           Significant Labs: BMP:   Recent Labs   Lab 02/20/24  0309   *      K 4.6      CO2 22*   BUN 53*    CREATININE 3.9*   CALCIUM 8.9   MG 2.2     CBC:   Recent Labs   Lab 02/19/24  0159 02/20/24  0309   WBC 9.22 8.63   HGB 7.5* 7.4*   HCT 24.4* 24.2*    355     Microbiology Results (last 7 days)       Procedure Component Value Units Date/Time    Aerobic culture [[id number]] Collected: 02/19/24 0820    Order Status: Completed Specimen: CSF (Spinal Fluid) from Cerebrospinal fluid (CSF) Updated: 02/20/24 0910     Aerobic Bacterial Culture No growth    CSF culture [[id number]] Collected: 02/19/24 0820    Order Status: Completed Specimen: CSF (Spinal Fluid) from Cerebrospinal fluid (CSF) Updated: 02/20/24 0740     CSF CULTURE No Growth to date     Gram Stain Result No WBC's      No organisms seen    Culture, Anaerobe [[id number]] Collected: 02/19/24 0820    Order Status: Completed Specimen: CSF (Spinal Fluid) from Cerebrospinal fluid (CSF) Updated: 02/20/24 0717     Anaerobic Culture Culture in progress    AFB Culture & Smear [[id number]] Collected: 02/19/24 0820    Order Status: Sent Specimen: CSF (Spinal Fluid) from Cerebrospinal fluid (CSF) Updated: 02/19/24 1525    Fungus culture [[id number]] Collected: 02/19/24 0820    Order Status: Sent Specimen: CSF (Spinal Fluid) from Cerebrospinal fluid (CSF) Updated: 02/19/24 1524    Gram stain [[id number]] Collected: 02/19/24 0820    Order Status: Canceled Specimen: CSF (Spinal Fluid) from Cerebrospinal fluid (CSF)     Urine culture [[id number]]  (Abnormal)  (Susceptibility) Collected: 02/16/24 1214    Order Status: Completed Specimen: Urine Updated: 02/18/24 1113     Urine Culture, Routine CHRYSEOBACTERIUM INDOLOGENES  > 100,000 cfu/ml      Narrative:      Specimen Source->Urine    Urine culture [[id number]]  (Abnormal)  (Susceptibility) Collected: 02/14/24 1249    Order Status: Completed Specimen: Urine Updated: 02/17/24 1411     Urine Culture, Routine BURKHOLDERIA SPECIES  >100,000 cfu/ml  Further identified as B. multivorans      Narrative:      Indicated criteria  for high risk culture:->Other  Other (specify):->fever    Urine culture [[id number]] Collected: 02/14/24 1249    Order Status: Completed Specimen: Urine Updated: 02/15/24 2205     Urine Culture, Routine Multiple organisms isolated. None in predominance.  Repeat if      clinically necessary.    Narrative:      Specimen Source->Urine    Culture, Anaerobe [[id number]]  (Abnormal) Collected: 01/30/24 0228    Order Status: Completed Specimen: Wound from Groin Updated: 02/14/24 1139     Anaerobic Culture BACTEROIDES SPECIES  Few      Narrative:      Right groin tissue    Blood culture [[id number]]     Order Status: Canceled Specimen: Blood             Significant Imaging: I have reviewed all pertinent imaging results/findings within the past 24 hours.

## 2024-02-20 NOTE — SUBJECTIVE & OBJECTIVE
Interval History: NAEO. VSS. AF. Wound vac exchanged yesterday at bedside.  Holding suction.  Palliative discussed with family further goals of care with wishes for full interventions.    Medications:  Continuous Infusions:   dextrose 10 % in water (D10W)       Scheduled Meds:   amLODIPine  10 mg Per OG tube Daily    carvediloL  25 mg Per OG tube BID    furosemide (LASIX) injection  120 mg Intravenous TID    heparin (porcine)  7,500 Units Subcutaneous Q8H    insulin aspart U-100  6 Units Subcutaneous Q4H    insulin detemir U-100  14 Units Subcutaneous Daily    piperacillin-tazobactam (Zosyn) IV (PEDS and ADULTS) (extended infusion is not appropriate)  4.5 g Intravenous Q8H    psyllium husk (aspartame)  1 packet Per OG tube BID    sevelamer carbonate  1.6 g Per OG tube TID WM     PRN Meds:0.9%  NaCl infusion (for blood administration), sodium chloride 0.9%, acetaminophen, albuterol-ipratropium, dextrose 10 % in water (D10W), dextrose 10%, dextrose 10%, glucagon (human recombinant), glucose, glucose, hydrALAZINE, insulin aspart U-100, labetalol, naloxone, ondansetron, oxyCODONE, prochlorperazine, sodium chloride 0.9%, sodium chloride 0.9%, sodium chloride 0.9%     Review of patient's allergies indicates:  No Known Allergies  Objective:     Vital Signs (Most Recent):  Temp: 99.7 °F (37.6 °C) (02/20/24 0300)  Pulse: 81 (02/20/24 0645)  Resp: 17 (02/20/24 0645)  BP: 138/71 (02/20/24 0630)  SpO2: 100 % (02/20/24 0645) Vital Signs (24h Range):  Temp:  [98.3 °F (36.8 °C)-100.6 °F (38.1 °C)] 99.7 °F (37.6 °C)  Pulse:  [77-91] 81  Resp:  [12-21] 17  SpO2:  [99 %-100 %] 100 %  BP: (119-148)/(60-73) 138/71     Weight: (!) 148.8 kg (328 lb)  Body mass index is 54.58 kg/m².    Intake/Output - Last 3 Shifts         02/18 0700 02/19 0659 02/19 0700 02/20 0659 02/20 0700 02/21 0659    I.V. (mL/kg) 12.6 (0.1)      NG/ 435     IV Piggyback 298.6 297.6     Total Intake(mL/kg) 1046.2 (6.8) 732.6 (4.9)     Urine (mL/kg/hr) 2265  (0.6) 1935 (0.5)     Drains 150 200     Other 325 300     Stool 0      Total Output 2740 2435     Net -1693.8 -1702.4            Stool Occurrence 1 x               Physical Exam  Vitals and nursing note reviewed.   Constitutional:       General: She is not in acute distress.     Appearance: She is ill-appearing.   HENT:      Head: Normocephalic and atraumatic.   Eyes:      Extraocular Movements: Extraocular movements intact.      Conjunctiva/sclera: Conjunctivae normal.   Neck:      Comments: Left IJ Trialysis catheter  Cardiovascular:      Rate and Rhythm: Normal rate and regular rhythm.   Pulmonary:      Effort: Pulmonary effort is normal.      Comments: Intubated, on spontaneous    Abdominal:      General: There is no distension.      Palpations: Abdomen is soft.      Tenderness: There is no abdominal tenderness.   Genitourinary:     Comments: Glynn in place    Skin:     General: Skin is warm and dry.      Comments: Wound vac in place to R thigh/groin to suction   Neurological:      General: No focal deficit present.      Mental Status: She is oriented to person, place, and time.   Psychiatric:         Mood and Affect: Mood normal.         Behavior: Behavior normal.          Significant Labs:  I have reviewed all pertinent lab results within the past 24 hours.  CBC:   Recent Labs   Lab 02/20/24  0309   WBC 8.63   RBC 2.74*   HGB 7.4*   HCT 24.2*      MCV 88   MCH 27.0   MCHC 30.6*       BMP:   Recent Labs   Lab 02/20/24  0309   *      K 4.6      CO2 22*   BUN 53*   CREATININE 3.9*   CALCIUM 8.9   MG 2.2         Significant Diagnostics:  I have reviewed all pertinent imaging results/findings within the past 24 hours.

## 2024-02-20 NOTE — ASSESSMENT & PLAN NOTE
Fever and leukocytosis on 2/13. Samples collected for culture on 2/14 via old meza catheter with Burkholderia multivorans. Repeat urinalysis and culture after meza replacement on 2/16 with Chryseobacterium indologenes. After meza replacement she has been afebrile and without leukocytosis.     Infections with these organisms are usually related to indwelling devices such as the meza catheter. In this patient, I suspect likely colonization of the indwelling meza and/or urinary tract as fever and leukocytosis resolved without culture directed antibiotic therapy.   Can continue Zosyn.  Monitor for fever and leukocytosis.  Other antibiotic recommendations as stated in Ayaz's gangrene.   Recommend limiting antibiotic therapy as possible as patient has organisms with antimicrobial resistance.

## 2024-02-20 NOTE — ASSESSMENT & PLAN NOTE
Transfer from [facility]. Admitted for fourniers gangrene. Initiated HD on 1/31. ALVARADO 2/2 ATN in setting of septic shock. Arrived with CR 3.8. Unknown baseline. S/P OR debridement with possible closure and wound vac placement     ALVARADO most likley ATN in setting of septic shock     Plan/Recommendation  - Will plan for nocturnal 8hrs SLED today. No UF.   - Continue IV lasix 120mg TID.   -Daily RFP   -Strict I&Os  -Avoid nephrotoxins, if possible

## 2024-02-20 NOTE — PROGRESS NOTES
[facility]  Infectious Disease  Progress Note    Patient Name: Sarah Saravia  MRN: [id number]  Admission Date: 1/29/2024  Length of Stay: 22 days  Attending Physician: Steve Cole MD  [facility]    Isolation Status: Contact  Assessment/Plan:      Neuro  Ac isch multi vasc territories stroke  Multiple acute embolic strokes noted on MRI. TTE without evidence of infective endocarditis. Blood cultures no growth. Underwent operative JAY without any reported abnormalities.     Patient now s/p diagnostic lumbar puncture on 2/16. CSF pleocytosis noted however a CSF WBC of 8 is not indicated of an infectious process. Suspect abnormal cell count is due to ischemic embolic process noted on imaging.   Unlikely that emboli as septic in nature as patient was not actively bacteremic, does not meet clinical criteria for IE based on modified Duke Criteria, and JAY was negative for vegetations and structural abnormalities of the heart valves.     Renal/  * Ayaz's gangrene  I have reviewed hospital notes from  SICU service and other specialty providers. I have also reviewed CBC, CMP/BMP,  cultures and imaging with my interpretation as documented.     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 Operative cultures showing P mirabilis. S/P wound VAC exchange on 2/6. S/p wound VAC exchange and debridement on 2/9.     Since last seen patient has had OR wound VAC exchanges. She is now afebrile and without leukocytosis. Low grade fever yesterday noted.   Can continue Zosyn for now.  If patient has another febrile episode I would recommend Zosyn be discontinued, ceftriaxone plus metronidazole started and if concern for UTI then would also give Bactrim for 3 days.   If not further debridements have been required then would discontinue antibiotics on 2/22/24.  Monitor CBC and CMP weekly while on antibiotics.  Discussed management plan with the staff and/or  members from SICU service.       Acute cystitis without hematuria  Fever and leukocytosis on 2/13. Samples collected for culture on 2/14 via old meza catheter with Burkholderia multivorans. Repeat urinalysis and culture after meza replacement on 2/16 with Chryseobacterium indologenes. After meza replacement she has been afebrile and without leukocytosis.     Infections with these organisms are usually related to indwelling devices such as the meza catheter. In this patient, I suspect likely colonization of the indwelling meza and/or urinary tract as fever and leukocytosis resolved without culture directed antibiotic therapy.   Can continue Zosyn.  Monitor for fever and leukocytosis.  Other antibiotic recommendations as stated in Ayaz's gangrene.   Recommend limiting antibiotic therapy as possible as patient has organisms with antimicrobial resistance.           Anticipated Disposition: per primary    Thank you for your consult.  I will follow peripherally for 24 hours. Please call if questions arise.    Devika Andujar MD  Infectious Disease  [facility] - Surgical Intensive Care    50 minutes of total time spent on the encounter, which includes face to face time and non-face to face time preparing to see the patient (eg, review of tests), obtaining and/or reviewing separately obtained history, documenting clinical information in the electronic or other health record, independently interpreting results (not separately reported) and communicating results to the patient/family/caregiver, or care coordination (not separately reported).     Subjective:     Principal Problem:Ayaz's gangrene    HPI: A 53-year-old woman with morbid obesity as evidenced by a BMI of 50 3 (documented as 58 at [facility]) who originally presented to [facility] with a wound to her posterior thigh, vomiting, and diarrhea for 3-4 days prior to admission.  On evaluation in [facility] ED she was noted to be afebrile tachycardic,  and hypertensive.  Laboratory workup at that time revealed leukocytosis, elevated creatinine, elevated lactic acid, and elevated CRP.  Additionally it showed hyperglycemia with anion gap acidosis consistent with diabetic ketoacidosis.  CT imaging of the abdomen showed extensive soft tissue air throughout the right groin and inguinal region extending to the right lower quadrant of the anterior abdominal wall and medial aspect of the right thigh concerning for gas-forming infection.  She was admitted to hospital medicine service and taken to the OR by General surgery for debridement.  Empiric antibiotics with Zosyn, clindamycin, and vancomycin was started.  She underwent incision and drainage with debridement of the soft tissues down to the muscular fascia on 01/29.  She was then subsequently taken back to the OR on 01/31 where she underwent irrigation and debridement of the wound and insertion of a triple-lumen temporary hemodialysis catheter.  She was subsequently transitioned to meropenem and clindamycin while in [facility].  Unfortunately her hospital course was complicated by acute respiratory failure requiring intubation with mechanical ventilation and worsening ALVARADO prompting initiation of renal replacement therapy.  She was transferred to [facility] for higher level of care.  Upon transfer the patient's antibiotic therapy was deescalated to ceftriaxone.    Infectious disease is consulted for Antibiotic management: currently on [facility], concern for nec fasciitis    Patient seen and evaluated by Infectious Diseases. 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 Taken to OR on 2/2 for debridement of the right buttocks and groin (#3). Evaluated by Neurology on 2/3 for encephalopathy. CT head with remote infarcts. Not withdrawing to pain on the morning of 2/5.     MRI done on 2/6 with embolic infarcts. S/P wound VAC exchange on 2/6. MRI brain revealed multiple  "embolic infarcts. Wound VAC exchange with debridement of fibrinous tissue, wound approximation and wound VAC placement on 2/9. Intraoperative JAY on 2/9 without cardiac abnormalities per EMR review. Recommendations for at least 2 weeks of antibiotic therapy from debridement on 2/9 given. Long term antibiotic therapy for concern for septic embolization not recommended due to negative JAY, Modified Berg criteria not meeting definition for probable or possible endocarditis, and negative blood cultures decision.    Unfortunately, the patients hospital course was further complicated by fever and leukocytosis on Hospital Day 16 and 18 prompting primary service for performed urine studies. 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      Infectious Diseases consulted for "Positive UA with uncommon bacteria"            Interval History: "". Infectious Diseases re-consulted for urinary culture positivity. Transitioned to Zosyn by SICU team. Low grade fever overnight.    Review of Systems   Unable to perform ROS: Intubated     Objective:     Vital Signs (Most Recent):  Temp: 98.7 °F (37.1 °C) (02/20/24 0800)  Pulse: 82 (02/20/24 0900)  Resp: (!) 21 (02/20/24 0900)  BP: 121/66 (02/20/24 0900)  SpO2: 100 % (02/20/24 0900) Vital Signs (24h Range):  Temp:  [98.7 °F (37.1 °C)-100.6 °F (38.1 °C)] 98.7 °F (37.1 °C)  Pulse:  [77-86] 82  Resp:  [12-22] 21  SpO2:  [100 %] 100 %  BP: (119-148)/(61-73) 121/66     Weight: (!) 148.8 kg (328 lb)  Body mass index is 54.58 kg/m².    Estimated Creatinine Clearance: 24.7 mL/min (A) (based on SCr of 3.9 mg/dL (H)).     Physical Exam  Vitals and nursing note reviewed.   Constitutional: "       Appearance: She is well-developed. She is ill-appearing.      Interventions: She is intubated.   HENT:      Head: Normocephalic and atraumatic.      Right Ear: External ear normal.      Left Ear: External ear normal.   Eyes:      General: No scleral icterus.        Right eye: No discharge.         Left eye: No discharge.      Conjunctiva/sclera: Conjunctivae normal.   Cardiovascular:      Rate and Rhythm: Normal rate and regular rhythm.      Heart sounds: Normal heart sounds. No murmur heard.     No friction rub. No gallop.   Pulmonary:      Effort: Pulmonary effort is normal. No respiratory distress. She is intubated.      Breath sounds: No stridor. No wheezing, rhonchi or rales.   Abdominal:      General: Bowel sounds are normal.   Musculoskeletal:         General: No tenderness.   Skin:     General: Skin is warm and dry.      Comments: Wound VAC on right groin area   Neurological:      Mental Status: She is lethargic.      Comments: Does not respond to verbal, tactile or painful stimuli.           Significant Labs: BMP:   Recent Labs   Lab 02/20/24  0309   *      K 4.6      CO2 22*   BUN 53*   CREATININE 3.9*   CALCIUM 8.9   MG 2.2     CBC:   Recent Labs   Lab 02/19/24  0159 02/20/24  0309   WBC 9.22 8.63   HGB 7.5* 7.4*   HCT 24.4* 24.2*    355     Microbiology Results (last 7 days)       Procedure Component Value Units Date/Time    Aerobic culture [[id number]] Collected: 02/19/24 0820    Order Status: Completed Specimen: CSF (Spinal Fluid) from Cerebrospinal fluid (CSF) Updated: 02/20/24 0910     Aerobic Bacterial Culture No growth    CSF culture [[id number]] Collected: 02/19/24 0820    Order Status: Completed Specimen: CSF (Spinal Fluid) from Cerebrospinal fluid (CSF) Updated: 02/20/24 0740     CSF CULTURE No Growth to date     Gram Stain Result No WBC's      No organisms seen    Culture, Anaerobe [[id number]] Collected: 02/19/24 0820    Order Status: Completed Specimen: CSF  (Spinal Fluid) from Cerebrospinal fluid (CSF) Updated: 02/20/24 0717     Anaerobic Culture Culture in progress    AFB Culture & Smear [[id number]] Collected: 02/19/24 0820    Order Status: Sent Specimen: CSF (Spinal Fluid) from Cerebrospinal fluid (CSF) Updated: 02/19/24 1525    Fungus culture [[id number]] Collected: 02/19/24 0820    Order Status: Sent Specimen: CSF (Spinal Fluid) from Cerebrospinal fluid (CSF) Updated: 02/19/24 1524    Gram stain [[id number]] Collected: 02/19/24 0820    Order Status: Canceled Specimen: CSF (Spinal Fluid) from Cerebrospinal fluid (CSF)     Urine culture [[id number]]  (Abnormal)  (Susceptibility) Collected: 02/16/24 1214    Order Status: Completed Specimen: Urine Updated: 02/18/24 1113     Urine Culture, Routine CHRYSEOBACTERIUM INDOLOGENES  > 100,000 cfu/ml      Narrative:      Specimen Source->Urine    Urine culture [[id number]]  (Abnormal)  (Susceptibility) Collected: 02/14/24 1249    Order Status: Completed Specimen: Urine Updated: 02/17/24 1411     Urine Culture, Routine BURKHOLDERIA SPECIES  >100,000 cfu/ml  Further identified as B. multivorans      Narrative:      Indicated criteria for high risk culture:->Other  Other (specify):->fever    Urine culture [[id number]] Collected: 02/14/24 1249    Order Status: Completed Specimen: Urine Updated: 02/15/24 2205     Urine Culture, Routine Multiple organisms isolated. None in predominance.  Repeat if      clinically necessary.    Narrative:      Specimen Source->Urine    Culture, Anaerobe [[id number]]  (Abnormal) Collected: 01/30/24 0228    Order Status: Completed Specimen: Wound from Groin Updated: 02/14/24 1139     Anaerobic Culture BACTEROIDES SPECIES  Few      Narrative:      Right groin tissue    Blood culture [[id number]]     Order Status: Canceled Specimen: Blood             Significant Imaging: I have reviewed all pertinent imaging results/findings within the past 24 hours.

## 2024-02-20 NOTE — ASSESSMENT & PLAN NOTE
I have reviewed hospital notes from  SICU service and other specialty providers. I have also reviewed CBC, CMP/BMP,  cultures and imaging with my interpretation as documented.     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 Operative cultures showing P mirabilis. S/P wound VAC exchange on 2/6. S/p wound VAC exchange and debridement on 2/9.     Since last seen patient has had OR wound VAC exchanges. She is now afebrile and without leukocytosis. Low grade fever yesterday noted.   Can continue Zosyn for now.  If patient has another febrile episode I would recommend Zosyn be discontinued, ceftriaxone plus metronidazole started and if concern for UTI then would also give Bactrim for 3 days.   If not further debridements have been required then would discontinue antibiotics on 2/22/24.  Monitor CBC and CMP weekly while on antibiotics.  Discussed management plan with the staff and/or members from SICU service.

## 2024-02-20 NOTE — ASSESSMENT & PLAN NOTE
Neuro/Psych:   #Acute Encephalopathy  CTH 2/3 with scattered hypoattenuation likely representing age indeterminate infarcts. EEG findings consistent with moderate-severe encephalopathy. MRI 2/6: Several scattered punctate acute infarcts throughout the bilateral frontal lobes, centrum semiovale, basal ganglia, corpus callosum, and cerebellar hemispheres.  CTA 2/6: Atherosclerotic plaquing of the carotid bifurcations and proximal ICAs with less than 50% proximal ICA stenosis by NASCET criteria . Repeat CTH and MRI/MRA unchanged from prior exams. S/p multiple wound vac exchanges. Palliative onboard with family wanting full measures.    Neuro/Psych  Repeat CTH and MRI/MRA stable from initial reads. LP 2/16. Elevated WBCs and protein consistent with bacterial meningitis.  -- Sedation: None  -- Pain: kermit tylenol, dialudid and oxy prn  -- GCS 5T: E2, V1T, M2; exam stable  -- Neurology following; appreciate recs  -- Neurovascular following previously; signed off 2/17  -- Palliative following; GOC conversation 2/7; appreciate recs  -- CSF culture with no growth             Cards:   -- HDS  -- goal SBP < 180, MAP >65  -- normotensive; hydralazine and labetalol prns  -- Continue amlodipine 10mg daily; coreg 25mg BID      Pulm:   #Acute Respiratory Failure  -- Intubated on spontaneous , Pressure support.  -- Goal O2 sat > 90%  -- Trach/PEG this Thursday 2/22      Renal:  #ALVARADO on CKD  #ATN  -- 2/13 LIJ trialysis  -- Nephrology following; appreciate recs  -- increased phos, on sevelamer, otherwise stable electrolytes.  -- Continue lasix 120mg IV tid    Recent Labs   Lab 02/19/24  1311 02/19/24  2209 02/20/24  0309   BUN 52* 51* 53*   CREATININE 3.6* 3.8* 3.9*        FEN / GI:   -- Daily CMPs  -- NGT in place   -- Replace lytes as needed  -- Nutrition: NPO, TF (Novasource Renal) at goal 30 cc/hr.    -- GI PPX   -- Nutrition following; appreciate recs  -- Continue psyllium and sevelamer      ID:    #Ayaz's gangrene  s/p wound  vac exchange/ debridement x4 for nec fascitis. R groin tissue with proteus, sensitive to ceftriaxone. Growing bacteroids as well. Urine cx 2/18 growing C. Indologens.  -- Abx: transitioned to zosyn 2/19 from ceftriaxone and flagyl. Same EOT 2/22   -- ID following ; appreciate recs         Heme/Onc:  #Anemia  -- Daily CBC  -- Heparin DVT ppx  -- Transfuse if Hgb <7     Endo:   #IDDM  Initially presented to OSH with DKA with latest A1C 10.4 AG Gap resolved  Placed on insulin gtt 2/3 and dced 2/12.    - q4h BG monitoring while NPO  - Detemir 14 units daily  - Novolog 6units q4h while on TFs  - Moderate dose SSI PRN  - Endocrine following, appreciate recs      PPx:   Feeding: Tube Feeds  Analgesia/Sedation: See above  Thromboembolic prevention: SQH   HOB >30: Y  Stress Ulcer ppx: Famotidine  Glucose control: Goal 140-180 g/dl, kermit detemir and novolog, SSI, Endo following  Bowel reg: psyllium  Invasive Lines/Drains/Airway: Glynn, ETT, LIJ Trialysis, PIV x2      Dispo/Code Status/Palliative:   -- SICU / Full Code   -- PEG/Trach Thursday

## 2024-02-20 NOTE — PROGRESS NOTES
[facility]  General Surgery  Progress Note    Subjective:     History of Present Illness:  53 year old morbidly obese female who does not routinely go to the doctor presents to the ED with malaise, nausea, vomiting, and groin, buttock, perineal swelling and tenderness. She began feeling ill a few days ago.     On arrival to the ED she is tachycardic to 120, hypertensive without fever. Labs demonstrate leukocytosis of 21, , with lactic acidosis and associated ALVARADO with cr 3.8, hyperglycemia with blood glucose of 824 accompanied by severe electrolyte derangements. CT imaging obtained demonstrates diffuse subcutaneous air along buttocks, perineum, anterior vulva, as well as bilateral thighs.     No prior abdominal surgeries. She denies problems with her heart or lungs. Non smoker. Does not routinely take any medications.    Post-Op Info:  Procedure(s) (LRB):  Lumbar Puncture (N/A)   4 Days Post-Op     Interval History: NAEO. VSS. AF. Wound vac exchanged yesterday at bedside.  Holding suction.  Palliative discussed with family further goals of care with wishes for full interventions.    Medications:  Continuous Infusions:   dextrose 10 % in water (D10W)       Scheduled Meds:   amLODIPine  10 mg Per OG tube Daily    carvediloL  25 mg Per OG tube BID    furosemide (LASIX) injection  120 mg Intravenous TID    heparin (porcine)  7,500 Units Subcutaneous Q8H    insulin aspart U-100  6 Units Subcutaneous Q4H    insulin detemir U-100  14 Units Subcutaneous Daily    piperacillin-tazobactam (Zosyn) IV (PEDS and ADULTS) (extended infusion is not appropriate)  4.5 g Intravenous Q8H    psyllium husk (aspartame)  1 packet Per OG tube BID    sevelamer carbonate  1.6 g Per OG tube TID WM     PRN Meds:0.9%  NaCl infusion (for blood administration), sodium chloride 0.9%, acetaminophen, albuterol-ipratropium, dextrose 10 % in water (D10W), dextrose 10%, dextrose 10%, glucagon (human recombinant), glucose,  glucose, hydrALAZINE, insulin aspart U-100, labetalol, naloxone, ondansetron, oxyCODONE, prochlorperazine, sodium chloride 0.9%, sodium chloride 0.9%, sodium chloride 0.9%     Review of patient's allergies indicates:  No Known Allergies  Objective:     Vital Signs (Most Recent):  Temp: 99.7 °F (37.6 °C) (02/20/24 0300)  Pulse: 81 (02/20/24 0645)  Resp: 17 (02/20/24 0645)  BP: 138/71 (02/20/24 0630)  SpO2: 100 % (02/20/24 0645) Vital Signs (24h Range):  Temp:  [98.3 °F (36.8 °C)-100.6 °F (38.1 °C)] 99.7 °F (37.6 °C)  Pulse:  [77-91] 81  Resp:  [12-21] 17  SpO2:  [99 %-100 %] 100 %  BP: (119-148)/(60-73) 138/71     Weight: (!) 148.8 kg (328 lb)  Body mass index is 54.58 kg/m².    Intake/Output - Last 3 Shifts         02/18 0700  02/19 0659 02/19 0700  02/20 0659 02/20 0700  02/21 0659    I.V. (mL/kg) 12.6 (0.1)      NG/ 435     IV Piggyback 298.6 297.6     Total Intake(mL/kg) 1046.2 (6.8) 732.6 (4.9)     Urine (mL/kg/hr) 2265 (0.6) 1935 (0.5)     Drains 150 200     Other 325 300     Stool 0      Total Output 2740 2435     Net -1693.8 -1702.4            Stool Occurrence 1 x              Physical Exam  Vitals and nursing note reviewed.   Constitutional:       General: She is not in acute distress.     Appearance: She is ill-appearing.   HENT:      Head: Normocephalic and atraumatic.   Eyes:      Extraocular Movements: Extraocular movements intact.      Conjunctiva/sclera: Conjunctivae normal.   Neck:      Comments: Left IJ Trialysis catheter  Cardiovascular:      Rate and Rhythm: Normal rate and regular rhythm.   Pulmonary:      Effort: Pulmonary effort is normal.      Comments: Intubated, on spontaneous    Abdominal:      General: There is no distension.      Palpations: Abdomen is soft.      Tenderness: There is no abdominal tenderness.   Genitourinary:     Comments: Glynn in place    Skin:     General: Skin is warm and dry.      Comments: Wound vac in place to R thigh/groin to suction   Neurological:       General: No focal deficit present.      Mental Status: She is oriented to person, place, and time.   Psychiatric:         Mood and Affect: Mood normal.         Behavior: Behavior normal.          Significant Labs:  I have reviewed all pertinent lab results within the past 24 hours.  CBC:   Recent Labs   Lab 02/20/24  0309   WBC 8.63   RBC 2.74*   HGB 7.4*   HCT 24.2*      MCV 88   MCH 27.0   MCHC 30.6*       BMP:   Recent Labs   Lab 02/20/24  0309   *      K 4.6      CO2 22*   BUN 53*   CREATININE 3.9*   CALCIUM 8.9   MG 2.2         Significant Diagnostics:  I have reviewed all pertinent imaging results/findings within the past 24 hours.  Assessment/Plan:     * Ayaz's gangrene  53 y.o. 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     - Last [place] change 2/19  - Will discuss plans for trach/PEG in the setting of fevers. No fevers in 24 hours. Likely plan for Thursday, Will obtain consent from family.  Will also plan to attempt closure of her posterior wound at that time.  - LP 2/16 - Specimen for cultures is missing.   - ID consulted for urine cultures - recommend broadening to zosyn    - UA growing Burkholderia species and Chryseibacterium Indologenes, recommend IV zosyn with stop date of 2/22  - Palliative following given overall poor prognosis, daughter would like everything done  - Stable on spontaneous   - Okay for DVT ppx   - Remainder of care per SICU        Enzo Bagley MD  General Surgery  [facility] - [facility]

## 2024-02-21 ENCOUNTER — ANESTHESIA EVENT (OUTPATIENT)
Dept: SURGERY | Facility: HOSPITAL | Age: 54
DRG: 004 | End: 2024-02-21
Payer: MEDICAID

## 2024-02-21 LAB
ABO + RH BLD: NORMAL
ALBUMIN SERPL BCP-MCNC: 1.6 G/DL (ref 3.5–5.2)
ALBUMIN SERPL BCP-MCNC: 1.6 G/DL (ref 3.5–5.2)
ALBUMIN SERPL BCP-MCNC: 2 G/DL (ref 3.5–5.2)
ALP SERPL-CCNC: 97 U/L (ref 55–135)
ALT SERPL W/O P-5'-P-CCNC: 11 U/L (ref 10–44)
ANION GAP SERPL CALC-SCNC: 6 MMOL/L (ref 8–16)
ANION GAP SERPL CALC-SCNC: 6 MMOL/L (ref 8–16)
ANION GAP SERPL CALC-SCNC: 9 MMOL/L (ref 8–16)
AST SERPL-CCNC: 29 U/L (ref 10–40)
BASOPHILS # BLD AUTO: 0.07 K/UL (ref 0–0.2)
BASOPHILS # BLD AUTO: 0.07 K/UL (ref 0–0.2)
BASOPHILS NFR BLD: 0.7 % (ref 0–1.9)
BASOPHILS NFR BLD: 0.7 % (ref 0–1.9)
BILIRUB SERPL-MCNC: 0.2 MG/DL (ref 0.1–1)
BLD GP AB SCN CELLS X3 SERPL QL: NORMAL
BLD PROD TYP BPU: NORMAL
BLOOD UNIT EXPIRATION DATE: NORMAL
BLOOD UNIT TYPE CODE: 1700
BLOOD UNIT TYPE: NORMAL
BUN SERPL-MCNC: 27 MG/DL (ref 6–20)
BUN SERPL-MCNC: 27 MG/DL (ref 6–20)
BUN SERPL-MCNC: 37 MG/DL (ref 6–20)
CA-I BLDV-SCNC: 1.11 MMOL/L (ref 1.06–1.42)
CA-I BLDV-SCNC: 1.12 MMOL/L (ref 1.06–1.42)
CALCIUM SERPL-MCNC: 6.8 MG/DL (ref 8.7–10.5)
CALCIUM SERPL-MCNC: 6.8 MG/DL (ref 8.7–10.5)
CALCIUM SERPL-MCNC: 8.9 MG/DL (ref 8.7–10.5)
CHLORIDE SERPL-SCNC: 105 MMOL/L (ref 95–110)
CHLORIDE SERPL-SCNC: 114 MMOL/L (ref 95–110)
CHLORIDE SERPL-SCNC: 114 MMOL/L (ref 95–110)
CO2 SERPL-SCNC: 20 MMOL/L (ref 23–29)
CO2 SERPL-SCNC: 20 MMOL/L (ref 23–29)
CO2 SERPL-SCNC: 22 MMOL/L (ref 23–29)
CODING SYSTEM: NORMAL
CREAT SERPL-MCNC: 1.9 MG/DL (ref 0.5–1.4)
CREAT SERPL-MCNC: 1.9 MG/DL (ref 0.5–1.4)
CREAT SERPL-MCNC: 3 MG/DL (ref 0.5–1.4)
CROSSMATCH INTERPRETATION: NORMAL
DIFFERENTIAL METHOD BLD: ABNORMAL
DIFFERENTIAL METHOD BLD: ABNORMAL
DISPENSE STATUS: NORMAL
EOSINOPHIL # BLD AUTO: 0.7 K/UL (ref 0–0.5)
EOSINOPHIL # BLD AUTO: 0.7 K/UL (ref 0–0.5)
EOSINOPHIL NFR BLD: 7.2 % (ref 0–8)
EOSINOPHIL NFR BLD: 7.5 % (ref 0–8)
ERYTHROCYTE [DISTWIDTH] IN BLOOD BY AUTOMATED COUNT: 17.1 % (ref 11.5–14.5)
ERYTHROCYTE [DISTWIDTH] IN BLOOD BY AUTOMATED COUNT: 17.7 % (ref 11.5–14.5)
EST. GFR  (NO RACE VARIABLE): 18 ML/MIN/1.73 M^2
EST. GFR  (NO RACE VARIABLE): 31.2 ML/MIN/1.73 M^2
EST. GFR  (NO RACE VARIABLE): 31.2 ML/MIN/1.73 M^2
GLUCOSE SERPL-MCNC: 110 MG/DL (ref 70–110)
GLUCOSE SERPL-MCNC: 110 MG/DL (ref 70–110)
GLUCOSE SERPL-MCNC: 168 MG/DL (ref 70–110)
HCT VFR BLD AUTO: 20.5 % (ref 37–48.5)
HCT VFR BLD AUTO: 26.3 % (ref 37–48.5)
HGB BLD-MCNC: 6.1 G/DL (ref 12–16)
HGB BLD-MCNC: 8.2 G/DL (ref 12–16)
IMM GRANULOCYTES # BLD AUTO: 0.05 K/UL (ref 0–0.04)
IMM GRANULOCYTES # BLD AUTO: 0.14 K/UL (ref 0–0.04)
IMM GRANULOCYTES NFR BLD AUTO: 0.5 % (ref 0–0.5)
IMM GRANULOCYTES NFR BLD AUTO: 1.5 % (ref 0–0.5)
LYMPHOCYTES # BLD AUTO: 1.5 K/UL (ref 1–4.8)
LYMPHOCYTES # BLD AUTO: 1.8 K/UL (ref 1–4.8)
LYMPHOCYTES NFR BLD: 15.9 % (ref 18–48)
LYMPHOCYTES NFR BLD: 19.6 % (ref 18–48)
MAGNESIUM SERPL-MCNC: 1.5 MG/DL (ref 1.6–2.6)
MAGNESIUM SERPL-MCNC: 2.1 MG/DL (ref 1.6–2.6)
MCH RBC QN AUTO: 27.7 PG (ref 27–31)
MCH RBC QN AUTO: 28 PG (ref 27–31)
MCHC RBC AUTO-ENTMCNC: 29.8 G/DL (ref 32–36)
MCHC RBC AUTO-ENTMCNC: 31.2 G/DL (ref 32–36)
MCV RBC AUTO: 90 FL (ref 82–98)
MCV RBC AUTO: 93 FL (ref 82–98)
MONOCYTES # BLD AUTO: 1.1 K/UL (ref 0.3–1)
MONOCYTES # BLD AUTO: 1.3 K/UL (ref 0.3–1)
MONOCYTES NFR BLD: 11.8 % (ref 4–15)
MONOCYTES NFR BLD: 14.3 % (ref 4–15)
NEUTROPHILS # BLD AUTO: 5.5 K/UL (ref 1.8–7.7)
NEUTROPHILS # BLD AUTO: 5.8 K/UL (ref 1.8–7.7)
NEUTROPHILS NFR BLD: 58.9 % (ref 38–73)
NEUTROPHILS NFR BLD: 61.4 % (ref 38–73)
NRBC BLD-RTO: 0 /100 WBC
NRBC BLD-RTO: 0 /100 WBC
NUM UNITS TRANS PACKED RBC: NORMAL
PHOSPHATE SERPL-MCNC: 2.6 MG/DL (ref 2.7–4.5)
PHOSPHATE SERPL-MCNC: 4.2 MG/DL (ref 2.7–4.5)
PLATELET # BLD AUTO: 309 K/UL (ref 150–450)
PLATELET # BLD AUTO: 363 K/UL (ref 150–450)
PMV BLD AUTO: 10 FL (ref 9.2–12.9)
PMV BLD AUTO: 9.3 FL (ref 9.2–12.9)
POCT GLUCOSE: 126 MG/DL (ref 70–110)
POCT GLUCOSE: 137 MG/DL (ref 70–110)
POCT GLUCOSE: 166 MG/DL (ref 70–110)
POCT GLUCOSE: 166 MG/DL (ref 70–110)
POCT GLUCOSE: 168 MG/DL (ref 70–110)
POTASSIUM SERPL-SCNC: 4 MMOL/L (ref 3.5–5.1)
POTASSIUM SERPL-SCNC: 4 MMOL/L (ref 3.5–5.1)
POTASSIUM SERPL-SCNC: 4.8 MMOL/L (ref 3.5–5.1)
PROT SERPL-MCNC: 6.2 G/DL (ref 6–8.4)
RBC # BLD AUTO: 2.2 M/UL (ref 4–5.4)
RBC # BLD AUTO: 2.93 M/UL (ref 4–5.4)
SODIUM SERPL-SCNC: 136 MMOL/L (ref 136–145)
SODIUM SERPL-SCNC: 140 MMOL/L (ref 136–145)
SODIUM SERPL-SCNC: 140 MMOL/L (ref 136–145)
SPECIMEN OUTDATE: NORMAL
WBC # BLD AUTO: 9.38 K/UL (ref 3.9–12.7)
WBC # BLD AUTO: 9.39 K/UL (ref 3.9–12.7)

## 2024-02-21 PROCEDURE — 0JH63XZ INSERTION OF TUNNELED VASCULAR ACCESS DEVICE INTO CHEST SUBCUTANEOUS TISSUE AND FASCIA, PERCUTANEOUS APPROACH: ICD-10-PCS | Performed by: SURGERY

## 2024-02-21 PROCEDURE — P9016 RBC LEUKOCYTES REDUCED: HCPCS

## 2024-02-21 PROCEDURE — 99900035 HC TECH TIME PER 15 MIN (STAT)

## 2024-02-21 PROCEDURE — 99232 SBSQ HOSP IP/OBS MODERATE 35: CPT | Mod: ,,, | Performed by: INTERNAL MEDICINE

## 2024-02-21 PROCEDURE — 83735 ASSAY OF MAGNESIUM: CPT | Performed by: STUDENT IN AN ORGANIZED HEALTH CARE EDUCATION/TRAINING PROGRAM

## 2024-02-21 PROCEDURE — 80053 COMPREHEN METABOLIC PANEL: CPT | Performed by: STUDENT IN AN ORGANIZED HEALTH CARE EDUCATION/TRAINING PROGRAM

## 2024-02-21 PROCEDURE — 86850 RBC ANTIBODY SCREEN: CPT

## 2024-02-21 PROCEDURE — 25000003 PHARM REV CODE 250: Performed by: STUDENT IN AN ORGANIZED HEALTH CARE EDUCATION/TRAINING PROGRAM

## 2024-02-21 PROCEDURE — 85025 COMPLETE CBC W/AUTO DIFF WBC: CPT | Mod: 91

## 2024-02-21 PROCEDURE — 20000000 HC ICU ROOM

## 2024-02-21 PROCEDURE — 63600175 PHARM REV CODE 636 W HCPCS

## 2024-02-21 PROCEDURE — 80069 RENAL FUNCTION PANEL: CPT | Performed by: STUDENT IN AN ORGANIZED HEALTH CARE EDUCATION/TRAINING PROGRAM

## 2024-02-21 PROCEDURE — 27100171 HC OXYGEN HIGH FLOW UP TO 24 HOURS

## 2024-02-21 PROCEDURE — 85025 COMPLETE CBC W/AUTO DIFF WBC: CPT

## 2024-02-21 PROCEDURE — 82330 ASSAY OF CALCIUM: CPT

## 2024-02-21 PROCEDURE — 82330 ASSAY OF CALCIUM: CPT | Mod: 91

## 2024-02-21 PROCEDURE — 25000003 PHARM REV CODE 250

## 2024-02-21 PROCEDURE — 94761 N-INVAS EAR/PLS OXIMETRY MLT: CPT

## 2024-02-21 PROCEDURE — 99291 CRITICAL CARE FIRST HOUR: CPT | Mod: 24,25,, | Performed by: SURGERY

## 2024-02-21 PROCEDURE — 27000207 HC ISOLATION

## 2024-02-21 PROCEDURE — 27000923 HC TRIALYSIS CATHETER, ANY SIZE

## 2024-02-21 PROCEDURE — 99232 SBSQ HOSP IP/OBS MODERATE 35: CPT | Mod: ,,, | Performed by: NURSE PRACTITIONER

## 2024-02-21 PROCEDURE — 63600175 PHARM REV CODE 636 W HCPCS: Performed by: STUDENT IN AN ORGANIZED HEALTH CARE EDUCATION/TRAINING PROGRAM

## 2024-02-21 PROCEDURE — 86920 COMPATIBILITY TEST SPIN: CPT

## 2024-02-21 PROCEDURE — 02H633Z INSERTION OF INFUSION DEVICE INTO RIGHT ATRIUM, PERCUTANEOUS APPROACH: ICD-10-PCS | Performed by: SURGERY

## 2024-02-21 PROCEDURE — 94003 VENT MGMT INPAT SUBQ DAY: CPT

## 2024-02-21 PROCEDURE — 99900026 HC AIRWAY MAINTENANCE (STAT)

## 2024-02-21 PROCEDURE — 30233N1 TRANSFUSION OF NONAUTOLOGOUS RED BLOOD CELLS INTO PERIPHERAL VEIN, PERCUTANEOUS APPROACH: ICD-10-PCS | Performed by: STUDENT IN AN ORGANIZED HEALTH CARE EDUCATION/TRAINING PROGRAM

## 2024-02-21 RX ORDER — MAGNESIUM SULFATE HEPTAHYDRATE 40 MG/ML
2 INJECTION, SOLUTION INTRAVENOUS ONCE
Status: COMPLETED | OUTPATIENT
Start: 2024-02-21 | End: 2024-02-21

## 2024-02-21 RX ORDER — CALCIUM CARBONATE 200(500)MG
500 TABLET,CHEWABLE ORAL 2 TIMES DAILY
Status: DISCONTINUED | OUTPATIENT
Start: 2024-02-21 | End: 2024-02-22

## 2024-02-21 RX ORDER — POLYETHYLENE GLYCOL 3350 17 G/17G
17 POWDER, FOR SOLUTION ORAL DAILY
Status: DISCONTINUED | OUTPATIENT
Start: 2024-02-21 | End: 2024-02-22

## 2024-02-21 RX ORDER — POLYETHYLENE GLYCOL 3350 17 G/17G
17 POWDER, FOR SOLUTION ORAL DAILY
Status: DISCONTINUED | OUTPATIENT
Start: 2024-02-21 | End: 2024-02-21

## 2024-02-21 RX ORDER — SEVELAMER CARBONATE FOR ORAL SUSPENSION 800 MG/1
1.6 POWDER, FOR SUSPENSION ORAL 3 TIMES DAILY
Status: DISCONTINUED | OUTPATIENT
Start: 2024-02-22 | End: 2024-02-22

## 2024-02-21 RX ORDER — HYDROCODONE BITARTRATE AND ACETAMINOPHEN 500; 5 MG/1; MG/1
TABLET ORAL
Status: DISCONTINUED | OUTPATIENT
Start: 2024-02-21 | End: 2024-02-21

## 2024-02-21 RX ADMIN — INSULIN ASPART 1 UNITS: 100 INJECTION, SOLUTION INTRAVENOUS; SUBCUTANEOUS at 11:02

## 2024-02-21 RX ADMIN — PIPERACILLIN SODIUM AND TAZOBACTAM SODIUM 4.5 G: 4; .5 INJECTION, POWDER, FOR SOLUTION INTRAVENOUS at 09:02

## 2024-02-21 RX ADMIN — POLYETHYLENE GLYCOL 3350 17 G: 17 POWDER, FOR SOLUTION ORAL at 10:02

## 2024-02-21 RX ADMIN — MAGNESIUM SULFATE HEPTAHYDRATE 2 G: 40 INJECTION, SOLUTION INTRAVENOUS at 05:02

## 2024-02-21 RX ADMIN — FUROSEMIDE 120 MG: 10 INJECTION, SOLUTION INTRAVENOUS at 09:02

## 2024-02-21 RX ADMIN — HEPARIN SODIUM 7500 UNITS: 5000 INJECTION INTRAVENOUS; SUBCUTANEOUS at 05:02

## 2024-02-21 RX ADMIN — PIPERACILLIN SODIUM AND TAZOBACTAM SODIUM 4.5 G: 4; .5 INJECTION, POWDER, FOR SOLUTION INTRAVENOUS at 01:02

## 2024-02-21 RX ADMIN — HEPARIN SODIUM 7500 UNITS: 5000 INJECTION INTRAVENOUS; SUBCUTANEOUS at 03:02

## 2024-02-21 RX ADMIN — CALCIUM CARBONATE (ANTACID) CHEW TAB 500 MG 500 MG: 500 CHEW TAB at 08:02

## 2024-02-21 RX ADMIN — HEPARIN SODIUM 7500 UNITS: 5000 INJECTION INTRAVENOUS; SUBCUTANEOUS at 09:02

## 2024-02-21 RX ADMIN — AMLODIPINE BESYLATE 10 MG: 10 TABLET ORAL at 09:02

## 2024-02-21 RX ADMIN — INSULIN DETEMIR 14 UNITS: 100 INJECTION, SOLUTION SUBCUTANEOUS at 08:02

## 2024-02-21 RX ADMIN — FUROSEMIDE 120 MG: 10 INJECTION, SOLUTION INTRAVENOUS at 08:02

## 2024-02-21 RX ADMIN — INSULIN ASPART 1 UNITS: 100 INJECTION, SOLUTION INTRAVENOUS; SUBCUTANEOUS at 07:02

## 2024-02-21 RX ADMIN — INSULIN ASPART 6 UNITS: 100 INJECTION, SOLUTION INTRAVENOUS; SUBCUTANEOUS at 11:02

## 2024-02-21 RX ADMIN — PIPERACILLIN SODIUM AND TAZOBACTAM SODIUM 4.5 G: 4; .5 INJECTION, POWDER, FOR SOLUTION INTRAVENOUS at 05:02

## 2024-02-21 RX ADMIN — INSULIN ASPART 6 UNITS: 100 INJECTION, SOLUTION INTRAVENOUS; SUBCUTANEOUS at 07:02

## 2024-02-21 RX ADMIN — INSULIN ASPART 6 UNITS: 100 INJECTION, SOLUTION INTRAVENOUS; SUBCUTANEOUS at 03:02

## 2024-02-21 RX ADMIN — INSULIN ASPART 6 UNITS: 100 INJECTION, SOLUTION INTRAVENOUS; SUBCUTANEOUS at 08:02

## 2024-02-21 RX ADMIN — CALCIUM CARBONATE (ANTACID) CHEW TAB 500 MG 500 MG: 500 CHEW TAB at 05:02

## 2024-02-21 RX ADMIN — INSULIN ASPART 6 UNITS: 100 INJECTION, SOLUTION INTRAVENOUS; SUBCUTANEOUS at 04:02

## 2024-02-21 RX ADMIN — FUROSEMIDE 120 MG: 10 INJECTION, SOLUTION INTRAVENOUS at 03:02

## 2024-02-21 RX ADMIN — INSULIN ASPART 2 UNITS: 100 INJECTION, SOLUTION INTRAVENOUS; SUBCUTANEOUS at 03:02

## 2024-02-21 RX ADMIN — CARVEDILOL 25 MG: 25 TABLET, FILM COATED ORAL at 09:02

## 2024-02-21 RX ADMIN — CARVEDILOL 25 MG: 25 TABLET, FILM COATED ORAL at 08:02

## 2024-02-21 NOTE — PLAN OF CARE
Infectious Disease Note      Chart has been reviewed.  Patient is afebrile and without leukocytosis.    Recommendations:  No changes in ID recommendations. See progress note from 2/20/24 for recommendations.    Infectious Diseases will sign off. Please call if questions arise.  Devika Andujar MD, [facility]  Infectious Diseases

## 2024-02-21 NOTE — ASSESSMENT & PLAN NOTE
Neuro/Psych:   #Acute Encephalopathy  CTH 2/3 with scattered hypoattenuation likely representing age indeterminate infarcts. EEG findings consistent with moderate-severe encephalopathy. MRI 2/6: Several scattered punctate acute infarcts throughout the bilateral frontal lobes, centrum semiovale, basal ganglia, corpus callosum, and cerebellar hemispheres.  CTA 2/6: Atherosclerotic plaquing of the carotid bifurcations and proximal ICAs with less than 50% proximal ICA stenosis by NASCET criteria . Repeat CTH and MRI/MRA unchanged from prior exams. S/p multiple wound vac exchanges. Palliative onboard with family wanting full measures.    Neuro/Psych  Repeat CTH and MRI/MRA stable from initial reads. LP 2/16. Elevated WBCs and protein consistent with bacterial meningitis.  -- Sedation: None  -- Pain: kermit tylenol, dialudid and oxy prn  -- GCS 5T: E2, V1T, M2; exam stable  -- Neurology following; appreciate recs  -- Neurovascular following previously; signed off 2/17  -- Palliative following; GOC conversation 2/7; appreciate recs  -- CSF culture with no growth, awaiting further cultures             Cards:   -- HDS  -- goal SBP < 180, MAP >65  -- normotensive; hydralazine and labetalol prns  -- Continue amlodipine 10mg daily; coreg 25mg BID      Pulm:   #Acute Respiratory Failure  -- Intubated on spontaneous , Pressure support.  -- Goal O2 sat > 90%  -- Trach/PEG this Thursday 2/22      Renal:  #ALVARADO on CKD  #ATN 2/2 septic shock  2/13 [facility] trialysis  -- Received SLED overnight.  -- Nephrology following; appreciate recs  -- On sevelamer  -- Replace lytes as needed.  -- Continue lasix 120mg IV tid    Recent Labs   Lab 02/20/24  1753 02/20/24  2210 02/21/24  0347   BUN 64* 41* 27*  27*   CREATININE 4.0* 3.5* 1.9*  1.9*        FEN / GI:   -- Daily CMPs  -- NGT in place   -- Replace lytes as needed  -- Nutrition: NPO, TF (Novasource Renal) at goal 30 cc/hr.    -- GI PPX   -- Nutrition following; appreciate recs  -- Continue  psyllium and sevelamer      ID:    #Ayaz's gangrene  s/p wound vac exchange/ debridement x4 for nec fascitis. R groin tissue with proteus, sensitive to ceftriaxone. Growing bacteroids as well. Urine cx 2/18 growing C. Indologens.  -- Abx: transitioned to zosyn 2/19 from ceftriaxone and flagyl. Same EOT 2/22   -- If febrile, transition back to ceftriaxone and flagyl. If concerned with UTI, add on bactrim for 3 day course.  -- ID following ; appreciate recs     Heme/Onc:  #Anemia  -- Hgb 6.1, transfused 1 unit pRBC  -- Daily CBC  -- Heparin DVT ppx  -- Transfuse if Hgb <7     Endo:   #IDDM  Initially presented to OSH with DKA with latest A1C 10.4 AG Gap resolved  Placed on insulin gtt 2/3 and dced 2/12.    - q4h BG monitoring while NPO  - Detemir 14 units daily  - Novolog 6units q4h while on TFs  - Moderate dose SSI PRN  - Endocrine following, appreciate recs      PPx:   Feeding: Tube Feeds  Analgesia/Sedation: See above  Thromboembolic prevention: SQH   HOB >30: Y  Stress Ulcer ppx: Famotidine  Glucose control: Goal 140-180 g/dl, kermit detemir and novolog, SSI, Endo following  Bowel reg: psyllium  Invasive Lines/Drains/Airway: Glynn, ETT, LIJ Trialysis, PIV x2      Dispo/Code Status/Palliative:   -- SICU / Full Code   -- PEG/Trach Thursday

## 2024-02-21 NOTE — PROGRESS NOTES
[facility]  Nephrology  Progress Note    Patient Name: Sarah Saravia  MRN: [id number]  Admission Date: 1/29/2024  Hospital Length of Stay: 23 days  Attending Provider: Steve Cole MD   Primary Care Physician: Patricia Kell [facility]  Principal Problem:Ayaz's gangrene    Subjective:     HPI: Sarah Saravia is a 53 year old female with a past medical history of obesity (BMI 53) admitted with N/V and R groin wound found to be in DKA at OSH.  She underwent a CT abdomen and pelvis that showed extensive soft tissue air throughout the right groin and inguinal region and extending to involve the right lower quadrant anterior abdominal wall with extensive soft tissue air also seen involving the proximal medial aspect of the right thigh, concerning for gas-forming infection. She is s/p OR debridement for necrotizing fascitis on 1/29/24 and take back for 1/31/24. Right groin tissue growing proteus, susceptibilities still pending. Currently on meropenem and clindamycin which was d/c'd on 1/31/24. While at OSH pt developed acute respiratory failure requiring intubation. Nephrology was consulted for worsening alvarado in the setting of lactic acidosis and septic shock likely ATN, sCr elevated at 3.8 on admit.  OR today for debridement with possible closure and wound vac placement. Last HD 2/1. Net -1.3L. Electrolytes stable. Nephrology consulted for ALVARADO.     Interval History:     No acute events overnight. Patient toelrated SLED 8hrs session without issue. Net EVEN on SLED. Remains on lasix 120mg TID with good UOP.     Review of patient's allergies indicates:  No Known Allergies  Current Facility-Administered Medications   Medication Frequency    0.9%  NaCl infusion (CRRT USE ONLY) Continuous    0.9%  NaCl infusion PRN    acetaminophen tablet 650 mg Q4H PRN    albuterol-ipratropium 2.5 mg-0.5 mg/3 mL nebulizer solution 3 mL Q4H PRN    amLODIPine tablet 10 mg Daily    calcium carbonate  200 mg calcium (500 mg) chewable tablet 500 mg BID    carvediloL tablet 25 mg BID    dextrose 10 % infusion Continuous PRN    dextrose 10% bolus 125 mL 125 mL PRN    dextrose 10% bolus 250 mL 250 mL PRN    furosemide injection 120 mg TID    glucagon (human recombinant) injection 1 mg PRN    glucose chewable tablet 16 g PRN    glucose chewable tablet 24 g PRN    heparin (porcine) injection 7,500 Units Q8H    hydrALAZINE injection 10 mg Q4H PRN    insulin aspart U-100 pen 0-10 Units Q4H PRN    insulin aspart U-100 pen 6 Units Q4H    insulin detemir U-100 (Levemir) pen 14 Units Daily    labetalol 20 mg/4 mL (5 mg/mL) IV syring Q6H PRN    magnesium sulfate 2g in water 50mL IVPB (premix) PRN    naloxone 0.4 mg/mL injection 0.02 mg PRN    ondansetron injection 4 mg Q6H PRN    oxyCODONE 5 mg/5 mL solution 5 mg Q6H PRN    piperacillin-tazobactam (ZOSYN) 4.5 g in dextrose 5 % in water (D5W) 100 mL IVPB (MB+) Q8H    polyethylene glycol packet 17 g Daily    prochlorperazine injection Soln 5 mg Q6H PRN    [START ON 2/22/2024] sevelamer carbonate pwpk 1.6 g TID    sodium chloride 0.9% bolus 250 mL 250 mL PRN    sodium chloride 0.9% bolus 250 mL 250 mL PRN    sodium chloride 0.9% flush 10 mL PRN    sodium phosphate 20.01 mmol in dextrose 5 % (D5W) 250 mL IVPB PRN    sodium phosphate 30 mmol in dextrose 5 % (D5W) 250 mL IVPB PRN    sodium phosphate 39.99 mmol in dextrose 5 % (D5W) 250 mL IVPB PRN       Objective:     Vital Signs (Most Recent):  Temp: 98.7 °F (37.1 °C) (02/21/24 0700)  Pulse: 83 (02/21/24 0800)  Resp: 19 (02/21/24 0800)  BP: (!) 145/67 (02/21/24 0800)  SpO2: 100 % (02/21/24 0800) Vital Signs (24h Range):  Temp:  [98.6 °F (37 °C)-99 °F (37.2 °C)] 98.7 °F (37.1 °C)  Pulse:  [77-90] 83  Resp:  [19-35] 19  SpO2:  [100 %] 100 %  BP: (111-152)/(55-73) 145/67     Weight: (!) 150.9 kg (332 lb 10.8 oz) (02/21/24 0300)  Body mass index is 55.36 kg/m².  Body surface area is 2.63 meters squared.    I/O last 3 completed  shifts:  In: 3901.9 [I.V.:1733.5; NG/GT:1575; IV Piggyback:593.4]  Out: 4459 [Urine:2710; Other:1749]     Physical Exam  Vitals and nursing note reviewed.   Constitutional:       Appearance: She is well-developed. She is ill-appearing.      Interventions: She is intubated.   HENT:      Head: Normocephalic and atraumatic.      Right Ear: External ear normal.      Left Ear: External ear normal.   Eyes:      General: No scleral icterus.        Right eye: No discharge.         Left eye: No discharge.      Conjunctiva/sclera: Conjunctivae normal.   Cardiovascular:      Rate and Rhythm: Normal rate and regular rhythm.      Heart sounds: Normal heart sounds. No murmur heard.     No friction rub. No gallop.   Pulmonary:      Effort: Pulmonary effort is normal. No respiratory distress. She is intubated.      Breath sounds: No stridor. No wheezing, rhonchi or rales.   Abdominal:      General: Bowel sounds are normal.   Musculoskeletal:         General: No tenderness.   Skin:     General: Skin is warm and dry.      Comments: Wound VAC on right groin area   Neurological:      Mental Status: She is lethargic.      Comments: Does not respond to verbal, tactile or painful stimuli.           Significant Labs:  All labs within the past 24 hours have been reviewed.     Significant Imaging:  Labs: Reviewed  Assessment/Plan:     Renal/  * Ayaz's gangrene  - management per primary     ALVARADO (acute kidney injury)  Transfer from [facility]. Admitted for fourniers gangrene. Initiated HD on 1/31. ALVARADO 2/2 ATN in setting of septic shock. Arrived with CR 3.8. Unknown baseline. S/P OR debridement with possible closure and wound vac placement     ALVARADO most likley ATN in setting of septic shock     Plan/Recommendation  - No urgent need for rrt at this time.   - Will follow her closely to assess need for perm cath. Showing signs of renal recovery at this time with UOP, awaiting clearance.   - Please dose all ABX in accordance to RRT schedule  (zosyn, etc)  - Continue IV lasix 120mg TID.   -Daily RFP   -Strict I&Os  -Avoid nephrotoxins, if possible         Thank you for your consult. I will follow-up with patient. Please contact us if you have any additional questions.    Case discussed with attending. Attestation to follow.       Antione Hazel DO  Nephrology  [facility]

## 2024-02-21 NOTE — SUBJECTIVE & OBJECTIVE
Interval History: NAEO. VSS. Patient stable on spontaneous. Plan for OR tomorrow for trach/peg and possible wound closure.     Medications:  Continuous Infusions:   sodium chloride 0.9% Stopped (02/21/24 0325)    dextrose 10 % in water (D10W)       Scheduled Meds:   amLODIPine  10 mg Per OG tube Daily    calcium carbonate  500 mg Per NG tube BID    carvediloL  25 mg Per OG tube BID    furosemide (LASIX) injection  120 mg Intravenous TID    heparin (porcine)  7,500 Units Subcutaneous Q8H    insulin aspart U-100  6 Units Subcutaneous Q4H    insulin detemir U-100  14 Units Subcutaneous Daily    piperacillin-tazobactam (Zosyn) IV (PEDS and ADULTS) (extended infusion is not appropriate)  4.5 g Intravenous Q8H    polyethylene glycol  17 g Per NG tube Daily    [START ON 2/22/2024] sevelamer carbonate  1.6 g Per OG tube TID     PRN Meds:sodium chloride 0.9%, acetaminophen, albuterol-ipratropium, dextrose 10 % in water (D10W), dextrose 10%, dextrose 10%, glucagon (human recombinant), glucose, glucose, hydrALAZINE, insulin aspart U-100, labetalol, magnesium sulfate IVPB, naloxone, ondansetron, oxyCODONE, prochlorperazine, sodium chloride 0.9%, sodium chloride 0.9%, sodium chloride 0.9%, sodium phosphate 20.01 mmol in dextrose 5 % (D5W) 250 mL IVPB, sodium phosphate 30 mmol in dextrose 5 % (D5W) 250 mL IVPB, sodium phosphate 39.99 mmol in dextrose 5 % (D5W) 250 mL IVPB     Review of patient's allergies indicates:  No Known Allergies  Objective:     Vital Signs (Most Recent):  Temp: 98.7 °F (37.1 °C) (02/21/24 0700)  Pulse: 83 (02/21/24 0800)  Resp: 19 (02/21/24 0800)  BP: (!) 145/67 (02/21/24 0800)  SpO2: 100 % (02/21/24 0800) Vital Signs (24h Range):  Temp:  [98.6 °F (37 °C)-99 °F (37.2 °C)] 98.7 °F (37.1 °C)  Pulse:  [77-90] 83  Resp:  [19-35] 19  SpO2:  [100 %] 100 %  BP: (111-152)/(55-73) 145/67     Weight: (!) 150.9 kg (332 lb 10.8 oz)  Body mass index is 55.36 kg/m².    Intake/Output - Last 3 Shifts         02/19  0700  02/20 0659 02/20 0700  02/21 0659 02/21 0700  02/22 0659    I.V. (mL/kg)  1733.5 (11.5) 25.4 (0.2)    Blood   355    NG/ 1140 50    IV Piggyback 297.6 295.8     Total Intake(mL/kg) 732.6 (4.9) 3169.3 (21) 430.4 (2.9)    Urine (mL/kg/hr) 1935 (0.5) 1800 (0.5) 25 (0.1)    Drains 200      Other 300 1549 25    Stool  0     Total Output 2435 3349 50    Net -1702.4 -179.7 +380.4           Stool Occurrence  1 x              Physical Exam  Vitals and nursing note reviewed.   Constitutional:       General: She is not in acute distress.     Appearance: She is ill-appearing.   HENT:      Head: Normocephalic and atraumatic.   Eyes:      Extraocular Movements: Extraocular movements intact.      Conjunctiva/sclera: Conjunctivae normal.   Neck:      Comments: Left IJ Trialysis catheter  Cardiovascular:      Rate and Rhythm: Normal rate and regular rhythm.   Pulmonary:      Effort: Pulmonary effort is normal.      Comments: Intubated, on spontaneous    Abdominal:      General: There is no distension.      Palpations: Abdomen is soft.      Tenderness: There is no abdominal tenderness.   Genitourinary:     Comments: Glynn in place    Skin:     General: Skin is warm and dry.      Comments: Wound vac in place to R thigh/groin to suction   Neurological:      General: No focal deficit present.      Mental Status: She is oriented to person, place, and time.   Psychiatric:         Mood and Affect: Mood normal.         Behavior: Behavior normal.          Significant Labs:  I have reviewed all pertinent lab results within the past 24 hours.    Significant Diagnostics:  I have reviewed all pertinent imaging results/findings within the past 24 hours.

## 2024-02-21 NOTE — PROGRESS NOTES
[facility]  General Surgery  Progress Note    Subjective:     History of Present Illness:  53 year old morbidly obese female who does not routinely go to the doctor presents to the ED with malaise, nausea, vomiting, and groin, buttock, perineal swelling and tenderness. She began feeling ill a few days ago.     On arrival to the ED she is tachycardic to 120, hypertensive without fever. Labs demonstrate leukocytosis of 21, , with lactic acidosis and associated ALVARADO with cr 3.8, hyperglycemia with blood glucose of 824 accompanied by severe electrolyte derangements. CT imaging obtained demonstrates diffuse subcutaneous air along buttocks, perineum, anterior vulva, as well as bilateral thighs.     No prior abdominal surgeries. She denies problems with her heart or lungs. Non smoker. Does not routinely take any medications.    Post-Op Info:  Procedure(s) (LRB):  Lumbar Puncture (N/A)   5 Days Post-Op     Interval History: NAEO. VSS. Patient stable on spontaneous. Plan for OR tomorrow for trach/peg and possible wound closure.     Medications:  Continuous Infusions:   sodium chloride 0.9% Stopped (02/21/24 0325)    dextrose 10 % in water (D10W)       Scheduled Meds:   amLODIPine  10 mg Per OG tube Daily    calcium carbonate  500 mg Per NG tube BID    carvediloL  25 mg Per OG tube BID    furosemide (LASIX) injection  120 mg Intravenous TID    heparin (porcine)  7,500 Units Subcutaneous Q8H    insulin aspart U-100  6 Units Subcutaneous Q4H    insulin detemir U-100  14 Units Subcutaneous Daily    piperacillin-tazobactam (Zosyn) IV (PEDS and ADULTS) (extended infusion is not appropriate)  4.5 g Intravenous Q8H    polyethylene glycol  17 g Per NG tube Daily    [START ON 2/22/2024] sevelamer carbonate  1.6 g Per OG tube TID     PRN Meds:sodium chloride 0.9%, acetaminophen, albuterol-ipratropium, dextrose 10 % in water (D10W), dextrose 10%, dextrose 10%, glucagon (human recombinant), glucose, glucose,  hydrALAZINE, insulin aspart U-100, labetalol, magnesium sulfate IVPB, naloxone, ondansetron, oxyCODONE, prochlorperazine, sodium chloride 0.9%, sodium chloride 0.9%, sodium chloride 0.9%, sodium phosphate 20.01 mmol in dextrose 5 % (D5W) 250 mL IVPB, sodium phosphate 30 mmol in dextrose 5 % (D5W) 250 mL IVPB, sodium phosphate 39.99 mmol in dextrose 5 % (D5W) 250 mL IVPB     Review of patient's allergies indicates:  No Known Allergies  Objective:     Vital Signs (Most Recent):  Temp: 98.7 °F (37.1 °C) (02/21/24 0700)  Pulse: 83 (02/21/24 0800)  Resp: 19 (02/21/24 0800)  BP: (!) 145/67 (02/21/24 0800)  SpO2: 100 % (02/21/24 0800) Vital Signs (24h Range):  Temp:  [98.6 °F (37 °C)-99 °F (37.2 °C)] 98.7 °F (37.1 °C)  Pulse:  [77-90] 83  Resp:  [19-35] 19  SpO2:  [100 %] 100 %  BP: (111-152)/(55-73) 145/67     Weight: (!) 150.9 kg (332 lb 10.8 oz)  Body mass index is 55.36 kg/m².    Intake/Output - Last 3 Shifts         02/19 0700 02/20 0659 02/20 0700 02/21 0659 02/21 0700 02/22 0659    I.V. (mL/kg)  1733.5 (11.5) 25.4 (0.2)    Blood   355    NG/ 1140 50    IV Piggyback 297.6 295.8     Total Intake(mL/kg) 732.6 (4.9) 3169.3 (21) 430.4 (2.9)    Urine (mL/kg/hr) 1935 (0.5) 1800 (0.5) 25 (0.1)    Drains 200      Other 300 1549 25    Stool  0     Total Output 2435 3349 50    Net -1702.4 -179.7 +380.4           Stool Occurrence  1 x              Physical Exam  Vitals and nursing note reviewed.   Constitutional:       General: She is not in acute distress.     Appearance: She is ill-appearing.   HENT:      Head: Normocephalic and atraumatic.   Eyes:      Extraocular Movements: Extraocular movements intact.      Conjunctiva/sclera: Conjunctivae normal.   Neck:      Comments: Left IJ Trialysis catheter  Cardiovascular:      Rate and Rhythm: Normal rate and regular rhythm.   Pulmonary:      Effort: Pulmonary effort is normal.      Comments: Intubated, on spontaneous    Abdominal:      General: There is no distension.       Palpations: Abdomen is soft.      Tenderness: There is no abdominal tenderness.   Genitourinary:     Comments: Glynn in place    Skin:     General: Skin is warm and dry.      Comments: Wound vac in place to R thigh/groin to suction   Neurological:      General: No focal deficit present.      Mental Status: She is oriented to person, place, and time.   Psychiatric:         Mood and Affect: Mood normal.         Behavior: Behavior normal.          Significant Labs:  I have reviewed all pertinent lab results within the past 24 hours.    Significant Diagnostics:  I have reviewed all pertinent imaging results/findings within the past 24 hours.  Assessment/Plan:     * Ayaz's gangrene  53 y.o. 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     - Last [place] change 2/19  - Plan for trach/PEG and wound closure 2/22   - Hold TF at MN   - LP 2/16 - Specimen for cultures is missing.   - ID consulted for urine cultures - recommend broadening to zosyn    - UA growing Burkholderia species and Chryseibacterium Indologenes, recommend IV zosyn with stop date of 2/22  - Palliative following given overall poor prognosis, daughter would like everything done  - Stable on spontaneous   - Okay for DVT ppx   - Remainder of care per SICU        Celia Allison MD  General Surgery  [facility]

## 2024-02-21 NOTE — ASSESSMENT & PLAN NOTE
Lab Results   Component Value Date    CREATININE 1.9 (H) 02/21/2024    CREATININE 1.9 (H) 02/21/2024     Avoid insulin stacking  Titrate insulin slowly

## 2024-02-21 NOTE — SUBJECTIVE & OBJECTIVE
Interval History/Significant Events: SLED overnight with 1.4L pulled. Hgb 6.1, given 1unit pRBC and lytes replaced.    Follow-up For: Procedure(s) (LRB):  Lumbar Puncture (N/A)    Post-Operative Day: 5 Days Post-Op    Objective:     Vital Signs (Most Recent):  Temp: 98.7 °F (37.1 °C) (02/21/24 0640)  Pulse: 86 (02/21/24 0659)  Resp: (!) 27 (02/21/24 0659)  BP: (!) 148/72 (02/21/24 0645)  SpO2: 100 % (02/21/24 0659) Vital Signs (24h Range):  Temp:  [98.6 °F (37 °C)-99 °F (37.2 °C)] 98.7 °F (37.1 °C)  Pulse:  [77-90] 86  Resp:  [19-35] 27  SpO2:  [100 %] 100 %  BP: (111-148)/(55-73) 148/72     Weight: (!) 150.9 kg (332 lb 10.8 oz)  Body mass index is 55.36 kg/m².      Intake/Output Summary (Last 24 hours) at 2/21/2024 0720  Last data filed at 2/21/2024 0600  Gross per 24 hour   Intake 3169.31 ml   Output 3299 ml   Net -129.69 ml          Physical Exam   Vitals and nursing note reviewed.   Constitutional:       General: She is not in acute distress.     Appearance: She is ill-appearing.   HENT:      Head: Normocephalic and atraumatic.   Eyes:      Extraocular Movements: Extraocular movements intact.      Conjunctiva/sclera: Conjunctivae normal.   Neck:      Comments: Left IJ Trialysis catheter  Cardiovascular:      Rate and Rhythm: Normal rate and regular rhythm.   Pulmonary:      Effort: Pulmonary effort is normal.      Comments: Intubated, on spontaneous     Abdominal:      General: There is no distension.      Palpations: Abdomen is soft.      Tenderness: There is no abdominal tenderness.   Genitourinary:     Comments: Glynn in place     Skin:     General: Skin is warm and dry.      Comments: Wound vac in place to R thigh/groin to suction   Neurological:      General: No focal deficit present.      Mental Status: She is oriented to person, place, and time.   Psychiatric:         Mood and Affect: Mood normal.         Behavior: Behavior normal.       Vents:  Vent Mode: Spont (02/21/24 0659)  Set Rate: 18 BPM (02/06/24  0349)  Vt Set: 420 mL (02/06/24 0349)  Pressure Support: 10 cmH20 (02/21/24 0659)  PEEP/CPAP: 8 cmH20 (02/21/24 0659)  Oxygen Concentration (%): 30 (02/21/24 0659)  Peak Airway Pressure: 19 cmH20 (02/21/24 0659)  Plateau Pressure: 15 cmH20 (02/21/24 0659)  Total Ve: 8.39 L/m (02/21/24 0659)  Negative Inspiratory Force (cm H2O): 0 (02/21/24 0659)  F/VT Ratio<105 (RSBI): (!) 85.99 (02/21/24 0659)    Lines/Drains/Airways       Central Venous Catheter Line  Duration             Trialysis (Dialysis) Catheter 02/11/24 2303 left internal jugular 9 days              Drain  Duration                  NG/OG Tube 02/06/24 1030 Center mouth 14 days         Urethral Catheter 02/16/24 1215 4 days              Airway  Duration                  Airway - Non-Surgical 01/31/24 1020 20 days              Peripheral Intravenous Line  Duration                  Peripheral IV - Single Lumen 02/13/24 1132 18 G;1 3/4 in Right Forearm 7 days                    Significant Labs:    CBC/Anemia Profile:  Recent Labs   Lab 02/20/24  0309 02/21/24 0347   WBC 8.63 9.38   HGB 7.4* 6.1*   HCT 24.2* 20.5*    363   MCV 88 93   RDW 17.7* 17.7*        Chemistries:  Recent Labs   Lab 02/20/24  0309 02/20/24  1753 02/20/24  2210 02/21/24  0347    139 139 140  140   K 4.6 4.5 4.7 4.0  4.0    103 107 114*  114*   CO2 22* 25 25 20*  20*   BUN 53* 64* 41* 27*  27*   CREATININE 3.9* 4.0* 3.5* 1.9*  1.9*   CALCIUM 8.9 8.8 8.2* 6.8*  6.8*   ALBUMIN 2.0* 2.0* 1.9* 1.6*  1.6*   PROT 7.3  --   --  6.2   BILITOT 0.2  --   --  0.2   ALKPHOS 120  --   --  97   ALT 13  --   --  11   AST 31  --   --  29   MG 2.2 2.1 1.9 1.5*   PHOS 6.6* 6.3* 4.1 2.6*           Significant Imaging:  N/A

## 2024-02-21 NOTE — ASSESSMENT & PLAN NOTE
Transfer from [facility]. Admitted for fourniers gangrene. Initiated HD on 1/31. ALVARADO 2/2 ATN in setting of septic shock. Arrived with CR 3.8. Unknown baseline. S/P OR debridement with possible closure and wound vac placement     ALVARADO most likley ATN in setting of septic shock     Plan/Recommendation  - No urgent need for rrt at this time.   - Will follow her closely to assess need for perm cath. Showing signs of renal recovery at this time with UOP, awaiting clearance.   - Please dose all ABX in accordance to RRT schedule (zosyn, etc)  - Continue IV lasix 120mg TID.   -Daily RFP   -Strict I&Os  -Avoid nephrotoxins, if possible

## 2024-02-21 NOTE — PLAN OF CARE
SICU PLAN OF CARE    Dx: Ayaz's gangrene    Goals of Care: SBP <170, MAP >65    Vital Signs (last 12 hours):   Temp:  [98.6 °F (37 °C)-98.9 °F (37.2 °C)]   Pulse:  [77-90]   Resp:  [20-35]   BP: (111-152)/(55-73)   SpO2:  [100 %]      Neuro: Intubated  and Unresponsive    Cardiac: NSR 70-90s    Respiratory: Ventilator; Mode: Pressure Support, 30% FiO2, 8 PEEP    Gtts: None    Urine Output: Urethral Catheter  1010 mL/shift    Dialysis: SLED, UF Rate: 200 /hr for 8 hours, completed at 0300    Drains: Wound Vac, total output 75mL/shift    Diet: Tube Feeds at 50 mL/hour     Labs/Accuchecks: Q8 Mg/Renal Panel while on CRRT, daily AM labs; Q4 Accuchecks    Skin:  All skin remains free from injury.  Patient turned q2h, mattress inflated, and bed working correctly.    Shift Events:  1 unit PRBC transfused for low H/H. See flowsheet for further assessment/details.  Family updated on current condition/plan of care, questions answered, and emotional support provided.  MD updated on current condition, vitals, labs, and gtts.

## 2024-02-21 NOTE — SUBJECTIVE & OBJECTIVE
Interval History:     No acute events overnight. Patient toelrated SLED 8hrs session without issue. Net EVEN on SLED. Remains on lasix 120mg TID with good UOP.     Review of patient's allergies indicates:  No Known Allergies  Current Facility-Administered Medications   Medication Frequency    0.9%  NaCl infusion (CRRT USE ONLY) Continuous    0.9%  NaCl infusion PRN    acetaminophen tablet 650 mg Q4H PRN    albuterol-ipratropium 2.5 mg-0.5 mg/3 mL nebulizer solution 3 mL Q4H PRN    amLODIPine tablet 10 mg Daily    calcium carbonate 200 mg calcium (500 mg) chewable tablet 500 mg BID    carvediloL tablet 25 mg BID    dextrose 10 % infusion Continuous PRN    dextrose 10% bolus 125 mL 125 mL PRN    dextrose 10% bolus 250 mL 250 mL PRN    furosemide injection 120 mg TID    glucagon (human recombinant) injection 1 mg PRN    glucose chewable tablet 16 g PRN    glucose chewable tablet 24 g PRN    heparin (porcine) injection 7,500 Units Q8H    hydrALAZINE injection 10 mg Q4H PRN    insulin aspart U-100 pen 0-10 Units Q4H PRN    insulin aspart U-100 pen 6 Units Q4H    insulin detemir U-100 (Levemir) pen 14 Units Daily    labetalol 20 mg/4 mL (5 mg/mL) IV syring Q6H PRN    magnesium sulfate 2g in water 50mL IVPB (premix) PRN    naloxone 0.4 mg/mL injection 0.02 mg PRN    ondansetron injection 4 mg Q6H PRN    oxyCODONE 5 mg/5 mL solution 5 mg Q6H PRN    piperacillin-tazobactam (ZOSYN) 4.5 g in dextrose 5 % in water (D5W) 100 mL IVPB (MB+) Q8H    polyethylene glycol packet 17 g Daily    prochlorperazine injection Soln 5 mg Q6H PRN    [START ON 2/22/2024] sevelamer carbonate pwpk 1.6 g TID    sodium chloride 0.9% bolus 250 mL 250 mL PRN    sodium chloride 0.9% bolus 250 mL 250 mL PRN    sodium chloride 0.9% flush 10 mL PRN    sodium phosphate 20.01 mmol in dextrose 5 % (D5W) 250 mL IVPB PRN    sodium phosphate 30 mmol in dextrose 5 % (D5W) 250 mL IVPB PRN    sodium phosphate 39.99 mmol in dextrose 5 % (D5W) 250 mL IVPB PRN        Objective:     Vital Signs (Most Recent):  Temp: 98.7 °F (37.1 °C) (02/21/24 0700)  Pulse: 83 (02/21/24 0800)  Resp: 19 (02/21/24 0800)  BP: (!) 145/67 (02/21/24 0800)  SpO2: 100 % (02/21/24 0800) Vital Signs (24h Range):  Temp:  [98.6 °F (37 °C)-99 °F (37.2 °C)] 98.7 °F (37.1 °C)  Pulse:  [77-90] 83  Resp:  [19-35] 19  SpO2:  [100 %] 100 %  BP: (111-152)/(55-73) 145/67     Weight: (!) 150.9 kg (332 lb 10.8 oz) (02/21/24 0300)  Body mass index is 55.36 kg/m².  Body surface area is 2.63 meters squared.    I/O last 3 completed shifts:  In: 3901.9 [I.V.:1733.5; NG/GT:1575; IV Piggyback:593.4]  Out: 4459 [Urine:2710; Other:1749]     Physical Exam  Vitals and nursing note reviewed.   Constitutional:       Appearance: She is well-developed. She is ill-appearing.      Interventions: She is intubated.   HENT:      Head: Normocephalic and atraumatic.      Right Ear: External ear normal.      Left Ear: External ear normal.   Eyes:      General: No scleral icterus.        Right eye: No discharge.         Left eye: No discharge.      Conjunctiva/sclera: Conjunctivae normal.   Cardiovascular:      Rate and Rhythm: Normal rate and regular rhythm.      Heart sounds: Normal heart sounds. No murmur heard.     No friction rub. No gallop.   Pulmonary:      Effort: Pulmonary effort is normal. No respiratory distress. She is intubated.      Breath sounds: No stridor. No wheezing, rhonchi or rales.   Abdominal:      General: Bowel sounds are normal.   Musculoskeletal:         General: No tenderness.   Skin:     General: Skin is warm and dry.      Comments: Wound VAC on right groin area   Neurological:      Mental Status: She is lethargic.      Comments: Does not respond to verbal, tactile or painful stimuli.           Significant Labs:  All labs within the past 24 hours have been reviewed.     Significant Imaging:  Labs: Reviewed

## 2024-02-21 NOTE — ANESTHESIA PREPROCEDURE EVALUATION
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 female who  has no past medical history on file.  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 Palliative and Supportive Care was consulted to explore goals of care.     sodium chloride 0.9% Stopped (02/22/24 1045)    dextrose 10 % in water (D10W)       Hospital LOS: 24 days  ICU LOS: 20d 13h    Sarah Saravia is a 53 y.o. female with hx of morbid obesity (BMI 55) admitted in DKA with Ayaz's gangrene s/p multiple debridements and wound vac placement. CTH with multiple age indeterminate infarcts and scattered acute infarcts. Intubated in the SICU.    Phone consent obtained by daughter (POA) on phone with 2 ICU RN witnesses.    Presents for above  procedure.    TTE 2/6/24    Left Ventricle: The left ventricle is normal in size. Normal wall thickness. There is concentric remodeling. Normal wall motion. There is normal systolic function with a visually estimated ejection fraction of 60 - 65%. There is normal diastolic function. Normal left ventricular filling pressure.    Right Ventricle: Normal right ventricular cavity size. Wall thickness is normal. Right ventricle wall motion  is normal. Systolic function is normal.    Aorta: Aortic root is normal in size measuring 2.79 cm. Ascending aorta is normal measuring 2.57 cm.    IVC/SVC: Normal venous pressure at 3 mmHg.    Oxygen/Ventilation Requirements:  Vent Mode: Spont  Oxygen Concentration (%):  [30] 30  Resp Rate Total:  [16 br/min-46 br/min] 19 br/min  PEEP/CPAP:  [8 cmH20] 8 cmH20  Pressure Support:  [10 cmH20] 10 cmH20  Mean Airway Pressure:  [9.5 zoT83-46 cmH20] 13 cmH20    she is not on any long-term medications.     ALLERGIES:   Review of patient's allergies indicates:  No Known Allergies  LDA:   AIRWAY:   Vent Mode: Spont  Oxygen Concentration (%):  [30] 30  Resp Rate Total:  [14 br/min-29 br/min] 17 br/min  PEEP/CPAP:  [8 cmH20] 8 cmH20  Pressure Support:  [10 cmH20] 10 cmH20  Mean Airway Pressure:  [11 meY42-41 cmH20] 11 cmH20          Airway - Non-Surgical 01/31/24 1020 (Active)   Secured at 23 cm 02/22/24 1105   Measured At Teeth 02/22/24 1105   Secured Location Center 02/22/24 1105   Secured by Commercial tube van 02/22/24 1105   Position of ETT in xRay In good position 02/20/24 1619   Bite Block none 02/22/24 1105   Site Condition Cool;Dry 02/22/24 1105   Status Intact;Secured;Patent 02/22/24 1105   Site Assessment Clean;Dry;No bleeding;No drainage 02/22/24 1105   Cuff Pressure 30 cm H2O 02/07/24 2320         Lines/Drains/Airways       Central Venous Catheter Line  Duration             Trialysis (Dialysis) Catheter 02/11/24 2303 left internal jugular 10 days              Drain  Duration                   NG/OG Tube 02/06/24 1030 Center mouth 16 days         Urethral Catheter 02/16/24 1215 5 days         Fecal Incontinence  02/22/24 0900 <1 day              Airway  Duration                  Airway - Non-Surgical 01/31/24 1020 22 days              Peripheral Intravenous Line  Duration                  Peripheral IV - Single Lumen 02/22/24 0015 22 G Left;Posterior Forearm <1 day         Peripheral IV - Single Lumen 02/22/24 0020 20 G Posterior;Right Forearm <1 day                   Anesthesia Evaluation      Airway   Mallampati: unable to assess  Dental      Pulmonary    (-) COPD, asthma  Cardiovascular   (-) CABG/stent, CHF    Rate: Normal    Neuro/Psych    (+) CVA  (-) seizures    GI/Hepatic/Renal    (+) chronic renal disease ARF and Dialysis    Endo/Other    (+) diabetes mellitus  Abdominal                    MEDICATIONS:     No current outpatient medications on file prior to encounter.      Inpatient Medications:  Antibiotics (From admission, onward)      Start     Stop Route Frequency Ordered    02/22/24 0900  piperacillin-tazobactam (ZOSYN) 4.5 g in dextrose 5 % in water (D5W) 100 mL IVPB (MB+)         02/23/24 0059 IV Every 8 hours (non-standard times) 02/22/24 0626          VTE Risk Mitigation (From admission, onward)           Ordered     heparin (porcine) injection 7,500 Units  Every 8 hours         02/16/24 1659     IP VTE HIGH RISK PATIENT  Once         01/31/24 1331     Place sequential compression device  Until discontinued         01/30/24 0016     Place NATE hose  Until discontinued         01/30/24 0016                   amLODIPine  10 mg Per OG tube Daily    calcium carbonate  500 mg Per NG tube BID    carvediloL  25 mg Per OG tube BID    furosemide (LASIX) injection  120 mg Intravenous TID    heparin (porcine)  7,500 Units Subcutaneous Q8H    insulin aspart U-100  6 Units Subcutaneous Q4H    insulin detemir U-100  14 Units Subcutaneous Daily    pantoprazole  40 mg Intravenous Daily     piperacillin-tazobactam (Zosyn) IV (PEDS and ADULTS) (extended infusion is not appropriate)  4.5 g Intravenous Q8H    polyethylene glycol  17 g Per NG tube Daily    psyllium husk (aspartame)  1 packet Oral Daily    sevelamer carbonate  1.6 g Per OG tube TID       Current Facility-Administered Medications   Medication Dose Route Frequency Provider Last Rate Last Admin    0.9%  NaCl infusion (CRRT USE ONLY)   Intravenous Continuous Antione Hazel DO   Held at 02/22/24 1045    0.9%  NaCl infusion   Intravenous PRN Steve Cole MD   Stopped at 02/08/24 0927    acetaminophen tablet 650 mg  650 mg Oral Q4H PRN David Stokes MD   650 mg at 02/19/24 2314    albuterol-ipratropium 2.5 mg-0.5 mg/3 mL nebulizer solution 3 mL  3 mL Nebulization Q4H PRN Hector Sam MD        amLODIPine tablet 10 mg  10 mg Per OG tube Daily David Stokes MD   10 mg at 02/22/24 0832    calcium carbonate 200 mg calcium (500 mg) chewable tablet 500 mg  500 mg Per NG tube BID David Stokes MD   500 mg at 02/22/24 0832    carvediloL tablet 25 mg  25 mg Per OG tube BID David Stokes MD   25 mg at 02/22/24 0832    dextrose 10 % infusion   Intravenous Continuous PRN Zoie Muñiz, NP        dextrose 10% bolus 125 mL 125 mL  12.5 g Intravenous PRN Zoie Muñiz, NP        dextrose 10% bolus 250 mL 250 mL  25 g Intravenous PRN Zoie Muñiz, NP        furosemide injection 120 mg  120 mg Intravenous TID Chirag Borges MD   120 mg at 02/22/24 0832    glucagon (human recombinant) injection 1 mg  1 mg Intramuscular PRN Zoie Muñiz, NP        glucose chewable tablet 16 g  16 g Oral PRN Zoie Muñiz, NP        glucose chewable tablet 24 g  24 g Oral PRN Zoie Muñiz, NP        heparin (porcine) injection 7,500 Units  7,500 Units Subcutaneous Q8H Robinson Rousseau MD   7,500 Units at 02/22/24 0504    hydrALAZINE injection 10 mg  10 mg Intravenous Q4H PRN Carlos Acuna MD   10 mg at 02/17/24 0518    insulin aspart U-100 pen 0-10 Units   0-10 Units Subcutaneous Q4H PRN Zoie Muñiz NP   2 Units at 02/22/24 1128    insulin aspart U-100 pen 6 Units  6 Units Subcutaneous Q4H Salvador Alfaro PA-C   6 Units at 02/21/24 2308    insulin detemir U-100 (Levemir) pen 14 Units  14 Units Subcutaneous Daily Salvador Alfaro PA-C   14 Units at 02/22/24 0827    labetalol 20 mg/4 mL (5 mg/mL) IV syring  10 mg Intravenous Q6H PRN Chirag Borges MD   10 mg at 02/09/24 1732    magnesium sulfate 2g in water 50mL IVPB (premix)  2 g Intravenous PRN Antione Hazel DO        naloxone 0.4 mg/mL injection 0.02 mg  0.02 mg Intravenous PRN Suzanne Woo MD        ondansetron injection 4 mg  4 mg Intravenous Q6H PRN Hector Sam MD        oxyCODONE 5 mg/5 mL solution 5 mg  5 mg Per OG tube Q6H PRN Chirag Borges MD   5 mg at 02/18/24 2115    pantoprazole injection 40 mg  40 mg Intravenous Daily Bree Lewis DNP   40 mg at 02/22/24 0832    piperacillin-tazobactam (ZOSYN) 4.5 g in dextrose 5 % in water (D5W) 100 mL IVPB (MB+)  4.5 g Intravenous Q8H Chirag Borges MD 25 mL/hr at 02/22/24 1100 Rate Verify at 02/22/24 1100    polyethylene glycol packet 17 g  17 g Per NG tube Daily David Stokes MD   17 g at 02/21/24 1005    prochlorperazine injection Soln 5 mg  5 mg Intravenous Q6H PRN Hector Sam MD        psyllium husk (aspartame) 3.4 gram packet 1 packet  1 packet Oral Daily Robinson Rousseau MD        sevelamer carbonate pwpk 1.6 g  1.6 g Per OG tube TID Bree Lewis DNP   1.6 g at 02/22/24 0852    sodium chloride 0.9% bolus 250 mL 250 mL  250 mL Intravenous PRN Jailene Worthy MD        sodium chloride 0.9% bolus 250 mL 250 mL  250 mL Intravenous PRN Jailene Worthy MD        sodium chloride 0.9% flush 10 mL  10 mL Intravenous PRN Hector Sam MD        sodium phosphate 20.01 mmol in dextrose 5 % (D5W) 250 mL IVPB  20.01 mmol Intravenous PRN Antione Hazel DO        sodium phosphate 30 mmol in dextrose 5 % (D5W) 250 mL IVPB  30 mmol  Intravenous PRN Antione Hazel,         sodium phosphate 39.99 mmol in dextrose 5 % (D5W) 250 mL IVPB  39.99 mmol Intravenous PRN Antione Hazel DO              History:     Active Hospital Problems    Diagnosis  POA    *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]  Yes    Acute cystitis without hematuria [N30.00]  No    Fever [R50.9]  Unknown    Hyperphosphatemia [E83.39]  Unknown    Hypoalbuminemia [E88.09]  Yes    Cerebral infarction [I63.9]  Yes    Encounter for palliative care [Z51.5]  Not Applicable    Ac isch multi vasc territories stroke [I63.89]  Yes    Leukocytosis [D72.829]  Yes    Encephalopathy [G93.40]  Yes    Encephalopathy, metabolic [G93.41]  Yes    Acute hypoxemic respiratory failure [J96.01]  Yes    Weaning from mechanically assisted ventilation initiated [Z99.11]  Not Applicable    Acute blood loss anemia [D62]  Yes    Acute renal failure with tubular necrosis [N17.0]  Yes    New onset type 2 diabetes mellitus [E11.9]  Yes    Metabolic acidosis [E87.20]  Yes    ALVARADO (acute kidney injury) [N17.9]  Yes    Hyponatremia [E87.1]  Yes    Diabetic acidosis without coma [E11.10]  Yes    Morbid (severe) obesity due to excess calories [E66.01]  Yes    Severe sepsis [A41.9, R65.20]  Yes      Resolved Hospital Problems   No resolved problems to display.     Surgical History:    has a past surgical history that includes Incision and drainage of perirectal region (N/A, 1/29/2024); Wound exploration (Right, 1/31/2024); placement, trialysis cath (Right, 1/31/2024); Wound debridement (Bilateral, 2/2/2024); Wound debridement (Right, 2/6/2024); Wound debridement (Right, 2/9/2024); Replacement of wound vacuum-assisted closure device (Right, 2/12/2024); Wound debridement (Right, 2/12/2024); lumbar puncture (N/A, 2/16/2024); Replacement of wound vacuum-assisted closure device (N/A, 2/15/2024); and Replacement of wound vacuum-assisted closure device (Right, 2/19/2024).   Social History:    has no history on file for sexual activity.       Vitals:    02/22/24 1000 02/22/24 1041 02/22/24 1100 02/22/24 1105   BP: (!) 145/81  (!) 144/74    BP Location: Left arm  Left arm    Patient Position: Lying  Lying    Pulse: 89 88 87 89   Resp: 14 19 (!) 23   Temp:   37.1 °C (98.7 °F)    TempSrc:   Oral    SpO2: 100%  100% 100%   Weight:       Height:         Vital Signs Range (Last 24H):  Temp:  [37 °C (98.6 °F)-37.7 °C (99.9 °F)]   Pulse:  [81-93]   Resp:  [14-29]   BP: (128-158)/(60-81)   SpO2:  [100 %]     Body mass index is 55.36 kg/m².  Wt Readings from Last 4 Encounters:   02/21/24 (!) 150.9 kg (332 lb 10.8 oz)        Intake/Output - Last 3 Shifts         02/20 0700 02/21 0659 02/21 0700 02/22 0659 02/22 0700  02/23 0659    I.V. (mL/kg) 1733.5 (11.5) 48.8 (0.3)     Blood  355     NG/GT 1140 950 100    IV Piggyback 295.8 295.5 59.7    Total Intake(mL/kg) 3169.3 (21) 1649.3 (10.9) 159.7 (1.1)    Urine (mL/kg/hr) 1800 (0.5) 1165 (0.3) 300 (0.4)    Drains       Other 1549 175 50    Stool 0 0 0    Total Output 3349 1340 350    Net -179.7 +309.3 -190.3           Stool Occurrence 1 x 4 x 1 x          Lab Results   Component Value Date    WBC 10.37 02/22/2024    HGB 8.3 (L) 02/22/2024    HCT 26.6 (L) 02/22/2024     02/22/2024     02/22/2024    K 5.1 02/22/2024     02/22/2024    CREATININE 3.6 (H) 02/22/2024    BUN 47 (H) 02/22/2024    CO2 24 02/22/2024     (H) 02/22/2024    CALCIUM 9.0 02/22/2024    MG 2.2 02/22/2024    PHOS 4.7 (H) 02/22/2024    ALKPHOS 126 02/22/2024    ALT 14 02/22/2024    AST 30 02/22/2024    ALBUMIN 2.0 (L) 02/22/2024    INR 1.0 02/01/2024    APTT 26.2 02/15/2024    HGBA1C 10.4 (H) 01/30/2024    TROPONINI 0.022 02/13/2024     Recent Results (from the past 12 hour(s))   CBC auto differential    Collection Time: 02/22/24  2:09 AM   Result Value Ref Range    WBC 10.37 3.90 - 12.70 K/uL    RBC 2.98 (L) 4.00 - 5.40 M/uL    Hemoglobin 8.3 (L) 12.0 - 16.0 g/dL    Hematocrit 26.6 (L) 37.0 - 48.5 %    MCV 89 82 - 98 fL     MCH 27.9 27.0 - 31.0 pg    MCHC 31.2 (L) 32.0 - 36.0 g/dL    RDW 17.5 (H) 11.5 - 14.5 %    Platelets 318 150 - 450 K/uL    MPV 9.1 (L) 9.2 - 12.9 fL    Immature Granulocytes 0.3 0.0 - 0.5 %    Gran # (ANC) 6.2 1.8 - 7.7 K/uL    Immature Grans (Abs) 0.03 0.00 - 0.04 K/uL    Lymph # 1.8 1.0 - 4.8 K/uL    Mono # 1.5 (H) 0.3 - 1.0 K/uL    Eos # 0.8 (H) 0.0 - 0.5 K/uL    Baso # 0.06 0.00 - 0.20 K/uL    nRBC 0 0 /100 WBC    Gran % 59.7 38.0 - 73.0 %    Lymph % 17.6 (L) 18.0 - 48.0 %    Mono % 14.2 4.0 - 15.0 %    Eosinophil % 7.6 0.0 - 8.0 %    Basophil % 0.6 0.0 - 1.9 %    Differential Method Automated    Comprehensive metabolic panel    Collection Time: 02/22/24  2:09 AM   Result Value Ref Range    Sodium 139 136 - 145 mmol/L    Potassium 5.1 3.5 - 5.1 mmol/L    Chloride 106 95 - 110 mmol/L    CO2 24 23 - 29 mmol/L    Glucose 123 (H) 70 - 110 mg/dL    BUN 47 (H) 6 - 20 mg/dL    Creatinine 3.6 (H) 0.5 - 1.4 mg/dL    Calcium 9.0 8.7 - 10.5 mg/dL    Total Protein 7.7 6.0 - 8.4 g/dL    Albumin 2.0 (L) 3.5 - 5.2 g/dL    Total Bilirubin 0.3 0.1 - 1.0 mg/dL    Alkaline Phosphatase 126 55 - 135 U/L    AST 30 10 - 40 U/L    ALT 14 10 - 44 U/L    eGFR 14.5 (A) >60 mL/min/1.73 m^2    Anion Gap 9 8 - 16 mmol/L   Magnesium    Collection Time: 02/22/24  2:09 AM   Result Value Ref Range    Magnesium 2.2 1.6 - 2.6 mg/dL   Phosphorus    Collection Time: 02/22/24  2:09 AM   Result Value Ref Range    Phosphorus 4.7 (H) 2.7 - 4.5 mg/dL   POCT glucose    Collection Time: 02/22/24  2:09 AM   Result Value Ref Range    POCT Glucose 138 (H) 70 - 110 mg/dL   POCT glucose    Collection Time: 02/22/24  8:08 AM   Result Value Ref Range    POCT Glucose 139 (H) 70 - 110 mg/dL   POCT glucose    Collection Time: 02/22/24 11:25 AM   Result Value Ref Range    POCT Glucose 161 (H) 70 - 110 mg/dL     Recent Labs   Lab 02/21/24  0347 02/21/24  1414 02/22/24  0209   WBC 9.38 9.39 10.37   HGB 6.1* 8.2* 8.3*   HCT 20.5* 26.3* 26.6*    309 318      140 136 139   K 4.0  4.0 4.8 5.1   CREATININE 1.9*  1.9* 3.0* 3.6*     110 168* 123*     Patient's last menstrual period was 01/01/2024 (approximate).    EKG:   Results for orders placed or performed during the hospital encounter of 01/29/24   EKG 12-lead    Collection Time: 02/14/24  8:53 PM   Result Value Ref Range    QRS Duration 156 ms    [facility] QTC Calculation 466 ms    Narrative    Test Reason : R94.31,    Vent. Rate : 085 BPM     Atrial Rate : 085 BPM     P-R Int : 188 ms          QRS Dur : 156 ms      QT Int : 392 ms       P-R-T Axes : 061 067 023 degrees     QTc Int : 466 ms    Normal sinus rhythm  Right bundle branch block  Abnormal ECG  When compared with ECG of 12-FEB-2024 23:48,  T wave inversion now evident in Anterior leads  Confirmed by GREGG GRANT MD ([id number]) on 2/15/2024 9:32:44 AM    Referred By: [email or web address]   SELF           Confirmed By:GREGG GRANT MD     TTE:  Results for orders placed or performed during the hospital encounter of 01/29/24   Echo   Result Value Ref Range    RA Width 3.19 cm    Left Atrium Major Axis 5.14 cm    Left Atrium Minor Axis 5.35 cm    RA Major Axis 4.87 cm    LV Diastolic Volume 81.98 mL    LV Systolic Volume 27.87 mL    MV Peak E Josselyn 0.77 m/s    Ao VTI 31.37 cm    Ao peak josselyn 2.01 m/s    LVOT peak VTI 16.92 cm    LVOT peak josselyn 1.29 m/s    LVOT diameter 2.02 cm    AV mean gradient 10 mmHg    TAPSE 1.72 cm    RVDD 3.16 cm    LA size 3.99 cm    Ascending aorta 2.57 cm    STJ 2.32 cm    Sinus 2.79 cm    LVIDs 2.73 2.1 - 4.0 cm    Posterior Wall 1.1 0.6 - 1.1 cm    IVS 1.3 (A) 0.6 - 1.1 cm    LVIDd 5.0 3.5 - 6.0 cm    TDI LATERAL 0.10 m/s    LA WIDTH 4.17 cm    TDI SEPTAL 0.07 m/s    LV LATERAL E/E' RATIO 7.70 m/s    LV SEPTAL E/E' RATIO 11.00 m/s    FS 45 (A) 28 - 44 %    LA volume 74.15 cm3    LV mass 233.75 g    ZLVIDD -11.05     ZLVIDS -9.36     Left Ventricle Relative Wall Thickness 0.44 cm    AV valve area 1.73 cm²    AV Velocity Ratio 0.64     AV index  "(prosthetic) 0.54     Mean e' 0.09 m/s    LVOT area 3.2 cm2    LVOT stroke volume 54.20 cm3    AV peak gradient 16 mmHg    E/E' ratio 9.06 m/s    LV Systolic Volume Index 11.0 mL/m2    LV Diastolic Volume Index 32.40 mL/m2    LA Volume Index 29.3 mL/m2    LV Mass Index 92 g/m2    EDWARD by Velocity Ratio 2.06 cm²    BSA 2.72 m2    Est. RA pres 3 mmHg    Narrative      Left Ventricle: The left ventricle is normal in size. Normal wall   thickness. There is concentric remodeling. Normal wall motion. There is   normal systolic function with a visually estimated ejection fraction of 60   - 65%. There is normal diastolic function. Normal left ventricular filling   pressure.    Right Ventricle: Normal right ventricular cavity size. Wall thickness   is normal. Right ventricle wall motion  is normal. Systolic function is   normal.    Aorta: Aortic root is normal in size measuring 2.79 cm. Ascending aorta   is normal measuring 2.57 cm.    IVC/SVC: Normal venous pressure at 3 mmHg.       No results found for this or any previous visit.  JAY:  No results found for this or any previous visit.  Stress Test:  No results found for this or any previous visit.     LHC:  No results found for this or any previous visit.     PFT:  No results found for: "FEV1", "FVC", "IJH6FHM", "TLC", "DLCO"         Pre-op Assessment    I have reviewed the Patient Summary Reports.     I have reviewed the Nursing Notes. I have reviewed the NPO Status.      Review of Systems  Anesthesia Hx:  No problems with previous Anesthesia   History of prior surgery of interest to airway management or planning:          Denies Family Hx of Anesthesia complications.    Denies Personal Hx of Anesthesia complications.                    Cardiovascular:         Denies CABG/stent.     Denies CHF.                                 Pulmonary:    Denies COPD.  Denies Asthma.                    Renal/:  Chronic Renal Disease, ARF, Dialysis                Neurological:   CVA    " Denies Seizures.                                Endocrine:  Diabetes         Morbid Obesity / BMI > 40      Physical Exam  General: Well nourished    Airway:  Mallampati: unable to assess   Pre-Existing Airway: Oral Endotracheal tube    Chest/Lungs:  Mechanically ventilated  Heart:  Rate: Normal        Anesthesia Plan  Type of Anesthesia, risks & benefits discussed:    Anesthesia Type: Gen ETT  Intra-op Monitoring Plan: Standard ASA Monitors  Post Op Pain Control Plan: multimodal analgesia and IV/PO Opioids PRN  Induction:  IV  Airway Plan: Direct  Informed Consent: Informed consent signed with the Patient representative and all parties understand the risks and agree with anesthesia plan.  All questions answered.   ASA Score: 4  Day of Surgery Review of History & Physical: H&P Update referred to the surgeon/provider.    Ready For Surgery From Anesthesia Perspective.     .

## 2024-02-21 NOTE — PROGRESS NOTES
02/21/24 0325   Treatment   Treatment Type SLED   Treatment Status Blood returned   Dialyzer Time (hours) 4.1   BVP (Liters) 48.4 L   CRRT Comments Tx completed; blood returned

## 2024-02-21 NOTE — ASSESSMENT & PLAN NOTE
BG goal 140-180  No previous hx of T2DM per family at bedside. A1c of 10.4. DKA at OSH. BG stable on regimen.      Tube feeds will be held at [facility] for trach/peg chris.     Plan:  - Continue Levemir 14 units daily.  - Continue Novolog 6 units q 4 hrs while on tube feeding (HOLD if tube feeding is stopped or BG < 100)   - Continue Moderate Dose Correction Scale  - BG monitoring q 4 hrs while NPO /TF    ** Please call Endocrine for any BG related issues **      Discharge plans: TBD    Lab Results   Component Value Date    HGBA1C 10.4 (H) 01/30/2024

## 2024-02-21 NOTE — ASSESSMENT & PLAN NOTE
53 y.o. 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     - Last [place] change 2/19  - Plan for trach/PEG and wound closure 2/22   - Hold TF at MN   - LP 2/16 - Specimen for cultures is missing.   - ID consulted for urine cultures - recommend broadening to zosyn    - UA growing Burkholderia species and Chryseibacterium Indologenes, recommend IV zosyn with stop date of 2/22  - Palliative following given overall poor prognosis, daughter would like everything done  - Stable on spontaneous   - Okay for DVT ppx   - Remainder of care per SICU

## 2024-02-21 NOTE — SUBJECTIVE & OBJECTIVE
"Interval HPI:   Overnight events: Remains in SICU. Intubated. BG well controlled on current SQ insulin regimen. Tube feeds infusing. Plan for OR chris for trach/peg and possible wound closure. Diet NPO    Eating:   NPO  Nausea: No  Hypoglycemia and intervention: No  Fever: No  TPN and/or TF: Yes  If yes, type of TF/TPN and rate: TFs at 50 ml/hr     BP (!) 145/67   Pulse 83   Temp 98.7 °F (37.1 °C) (Oral)   Resp 19   Ht 5' 5" (1.651 m)   Wt (!) 150.9 kg (332 lb 10.8 oz)   LMP 01/01/2024 (Approximate) Comment: tubal ligation  SpO2 100%   BMI 55.36 kg/m²     Labs Reviewed and Include    Recent Labs   Lab 02/21/24  0347     110   CALCIUM 6.8*  6.8*   ALBUMIN 1.6*  1.6*   PROT 6.2     140   K 4.0  4.0   CO2 20*  20*   *  114*   BUN 27*  27*   CREATININE 1.9*  1.9*   ALKPHOS 97   ALT 11   AST 29   BILITOT 0.2     Lab Results   Component Value Date    WBC 9.38 02/21/2024    HGB 6.1 (L) 02/21/2024    HCT 20.5 (L) 02/21/2024    MCV 93 02/21/2024     02/21/2024     No results for input(s): "TSH", "FREET4" in the last 168 hours.  Lab Results   Component Value Date    HGBA1C 10.4 (H) 01/30/2024       Nutritional status:   Body mass index is 55.36 kg/m².  Lab Results   Component Value Date    ALBUMIN 1.6 (L) 02/21/2024    ALBUMIN 1.6 (L) 02/21/2024    ALBUMIN 1.9 (L) 02/20/2024     No results found for: "PREALBUMIN"    Estimated Creatinine Clearance: 51.1 mL/min (A) (based on SCr of 1.9 mg/dL (H)).    Accu-Checks  Recent Labs     02/19/24  2006 02/20/24  0021 02/20/24  0313 02/20/24  0801 02/20/24  1231 02/20/24  1639 02/20/24  1925 02/20/24  2317 02/21/24  0345 02/21/24  0802   POCTGLUCOSE 107 153* 146* 136* 190* 158* 141* 144* 126* 137*       Current Medications and/or Treatments Impacting Glycemic Control  Immunotherapy:    Immunosuppressants       None          Steroids:   Hormones (From admission, onward)      None          Pressors:    Autonomic Drugs (From admission, onward)      " None          Hyperglycemia/Diabetes Medications:   Antihyperglycemics (From admission, onward)      Start     Stop Route Frequency Ordered    02/13/24 0915  insulin detemir U-100 (Levemir) pen 14 Units         -- SubQ Daily 02/13/24 0906    02/09/24 1200  insulin aspart U-100 pen 6 Units         -- SubQ Every 4 hours 02/09/24 0901    02/03/24 1010  insulin aspart U-100 pen 0-10 Units         -- SubQ Every 4 hours PRN 02/03/24 0911

## 2024-02-21 NOTE — PROGRESS NOTES
[facility]  Critical Care - Surgery  Progress Note    Patient Name: Sarah Saravia  MRN: [id number]  Admission Date: 1/29/2024  Hospital Length of Stay: 23 days  Code Status: Full Code  Attending Provider: Steve Cole MD  [facility]   Principal Problem: Ayaz's gangrene    Subjective:   Interval History/Significant Events: SLED overnight with 1.4L pulled. Hgb 6.1, given 1unit pRBC and lytes replaced.    Follow-up For: Procedure(s) (LRB):  Lumbar Puncture (N/A)    Post-Operative Day: 5 Days Post-Op    Objective:     Vital Signs (Most Recent):  Temp: 98.7 °F (37.1 °C) (02/21/24 0640)  Pulse: 86 (02/21/24 0659)  Resp: (!) 27 (02/21/24 0659)  BP: (!) 148/72 (02/21/24 0645)  SpO2: 100 % (02/21/24 0659) Vital Signs (24h Range):  Temp:  [98.6 °F (37 °C)-99 °F (37.2 °C)] 98.7 °F (37.1 °C)  Pulse:  [77-90] 86  Resp:  [19-35] 27  SpO2:  [100 %] 100 %  BP: (111-148)/(55-73) 148/72     Weight: (!) 150.9 kg (332 lb 10.8 oz)  Body mass index is 55.36 kg/m².      Intake/Output Summary (Last 24 hours) at 2/21/2024 0720  Last data filed at 2/21/2024 0600  Gross per 24 hour   Intake 3169.31 ml   Output 3299 ml   Net -129.69 ml          Physical Exam   Vitals and nursing note reviewed.   Constitutional:       General: She is not in acute distress.     Appearance: She is ill-appearing.   HENT:      Head: Normocephalic and atraumatic.   Eyes:      Extraocular Movements: Extraocular movements intact.      Conjunctiva/sclera: Conjunctivae normal.   Neck:      Comments: Left IJ Trialysis catheter  Cardiovascular:      Rate and Rhythm: Normal rate and regular rhythm.   Pulmonary:      Effort: Pulmonary effort is normal.      Comments: Intubated, on spontaneous     Abdominal:      General: There is no distension.      Palpations: Abdomen is soft.      Tenderness: There is no abdominal tenderness.   Genitourinary:     Comments: Glynn in place     Skin:      General: Skin is warm and dry.      Comments: Wound vac in place to R thigh/groin to suction   Neurological:      General: No focal deficit present.      Mental Status: She is oriented to person, place, and time.   Psychiatric:         Mood and Affect: Mood normal.         Behavior: Behavior normal.       Vents:  Vent Mode: Spont (02/21/24 0659)  Set Rate: 18 BPM (02/06/24 0349)  Vt Set: 420 mL (02/06/24 0349)  Pressure Support: 10 cmH20 (02/21/24 0659)  PEEP/CPAP: 8 cmH20 (02/21/24 0659)  Oxygen Concentration (%): 30 (02/21/24 0659)  Peak Airway Pressure: 19 cmH20 (02/21/24 0659)  Plateau Pressure: 15 cmH20 (02/21/24 0659)  Total Ve: 8.39 L/m (02/21/24 0659)  Negative Inspiratory Force (cm H2O): 0 (02/21/24 0659)  F/VT Ratio<105 (RSBI): (!) 85.99 (02/21/24 0659)    Lines/Drains/Airways       Central Venous Catheter Line  Duration             Trialysis (Dialysis) Catheter 02/11/24 2303 left internal jugular 9 days              Drain  Duration                  NG/OG Tube 02/06/24 1030 Center mouth 14 days         Urethral Catheter 02/16/24 1215 4 days              Airway  Duration                  Airway - Non-Surgical 01/31/24 1020 20 days              Peripheral Intravenous Line  Duration                  Peripheral IV - Single Lumen 02/13/24 1132 18 G;1 3/4 in Right Forearm 7 days                    Significant Labs:    CBC/Anemia Profile:  Recent Labs   Lab 02/20/24  0309 02/21/24 0347   WBC 8.63 9.38   HGB 7.4* 6.1*   HCT 24.2* 20.5*    363   MCV 88 93   RDW 17.7* 17.7*        Chemistries:  Recent Labs   Lab 02/20/24  0309 02/20/24  1753 02/20/24  2210 02/21/24 0347    139 139 140  140   K 4.6 4.5 4.7 4.0  4.0    103 107 114*  114*   CO2 22* 25 25 20*  20*   BUN 53* 64* 41* 27*  27*   CREATININE 3.9* 4.0* 3.5* 1.9*  1.9*   CALCIUM 8.9 8.8 8.2* 6.8*  6.8*   ALBUMIN 2.0* 2.0* 1.9* 1.6*  1.6*   PROT 7.3  --   --  6.2   BILITOT 0.2  --   --  0.2   ALKPHOS 120  --   --  97   ALT 13  --    --  11   AST 31  --   --  29   MG 2.2 2.1 1.9 1.5*   PHOS 6.6* 6.3* 4.1 2.6*           Significant Imaging:  N/A  Assessment/Plan:     Renal/  * Ayaz's gangrene    Neuro/Psych:   #Acute Encephalopathy  CTH 2/3 with scattered hypoattenuation likely representing age indeterminate infarcts. EEG findings consistent with moderate-severe encephalopathy. MRI 2/6: Several scattered punctate acute infarcts throughout the bilateral frontal lobes, centrum semiovale, basal ganglia, corpus callosum, and cerebellar hemispheres.  CTA 2/6: Atherosclerotic plaquing of the carotid bifurcations and proximal ICAs with less than 50% proximal ICA stenosis by NASCET criteria . Repeat CTH and MRI/MRA unchanged from prior exams. S/p multiple wound vac exchanges. Palliative onboard with family wanting full measures.    Neuro/Psych  Repeat CTH and MRI/MRA stable from initial reads. LP 2/16. Elevated WBCs and protein consistent with bacterial meningitis.  -- Sedation: None  -- Pain: kermit tylenol, dialudid and oxy prn  -- GCS 5T: E2, V1T, M2; exam stable  -- Neurology following; appreciate recs  -- Neurovascular following previously; signed off 2/17  -- Palliative following; GOC conversation 2/7; appreciate recs  -- CSF culture with no growth, awaiting further cultures             Cards:   -- HDS  -- goal SBP < 180, MAP >65  -- normotensive; hydralazine and labetalol prns  -- Continue amlodipine 10mg daily; coreg 25mg BID      Pulm:   #Acute Respiratory Failure  -- Intubated on spontaneous , Pressure support.  -- Goal O2 sat > 90%  -- Trach/PEG this Thursday 2/22      Renal:  #ALVARADO on CKD  #ATN 2/2 septic shock  2/13 [facility] trialysis  -- Received SLED overnight.  -- Nephrology following; appreciate recs  -- On sevelamer  -- Replace lytes as needed.  -- Continue lasix 120mg IV tid    Recent Labs   Lab 02/20/24  1753 02/20/24  2210 02/21/24  0347   BUN 64* 41* 27*  27*   CREATININE 4.0* 3.5* 1.9*  1.9*        FEN / GI:   -- Daily CMPs  -- NGT  in place   -- Replace lytes as needed  -- Nutrition: NPO, TF (Novasource Renal) at goal 30 cc/hr.    -- GI PPX   -- Nutrition following; appreciate recs  -- Continue psyllium and sevelamer      ID:    #Ayaz's gangrene  s/p wound vac exchange/ debridement x4 for nec fascitis. R groin tissue with proteus, sensitive to ceftriaxone. Growing bacteroids as well. Urine cx 2/18 growing C. Indologens.  -- Abx: transitioned to zosyn 2/19 from ceftriaxone and flagyl. Same EOT 2/22   -- If febrile, transition back to ceftriaxone and flagyl. If concerned with UTI, add on bactrim for 3 day course.  -- ID following ; appreciate recs     Heme/Onc:  #Anemia  -- Hgb 6.1, transfused 1 unit pRBC  -- Daily CBC  -- Heparin DVT ppx  -- Transfuse if Hgb <7     Endo:   #IDDM  Initially presented to OSH with DKA with latest A1C 10.4 AG Gap resolved  Placed on insulin gtt 2/3 and dced 2/12.    - q4h BG monitoring while NPO  - Detemir 14 units daily  - Novolog 6units q4h while on TFs  - Moderate dose SSI PRN  - Endocrine following, appreciate recs      PPx:   Feeding: Tube Feeds  Analgesia/Sedation: See above  Thromboembolic prevention: SQH   HOB >30: Y  Stress Ulcer ppx: Famotidine  Glucose control: Goal 140-180 g/dl, kermit detemir and novolog, SSI, Endo following  Bowel reg: psyllium  Invasive Lines/Drains/Airway: Glynn, ETT, LIJ Trialysis, PIV x2      Dispo/Code Status/Palliative:   -- SICU / Full Code   -- PEG/Trach Thursday         David Stokes MD  Critical Care - Surgery  [facility] - Surgical Intensive Care

## 2024-02-21 NOTE — PROGRESS NOTES
"[facility] - [facility]  Endocrinology  Progress Note    Admit Date: 1/29/2024     Reason for Consult: Management of T2DM, Hyperglycemia     Surgical Procedure and Date: n/a    Diabetes diagnosis year: new onset     Home Diabetes Medications:  none per chart review and family present at bedside     Lab Results   Component Value Date    HGBA1C 10.4 (H) 01/30/2024       Diabetes Complications include:     Hyperglycemia, DKA at OSH     Complicating diabetes co morbidities:   Obesity       HPI:   Patient is a 53 y.o. female with a diagnosis of obesity (BMI 53) admitted with N/V and R groin wound found to be in DKA at OSH. 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 While at OSH pt developed acute respiratory failure requiring intubation. Pulmonology was consulted for ventilator management and critical illness. Nephrology was consulted for worsening vishal in the setting of lactic acidosis and septic shock likely ATN, sCr elevated at 3.8 on admit now 4.0 post HD. Pt being admitted to SICU for surgical critical care management. Immediate plans include hemodynamic stabilization, weaning of respiratory support, and RRT.  Endocrinology consulted for management of T2DM.                 Interval HPI:   Overnight events: Remains in SICU. Intubated. BG well controlled on current SQ insulin regimen. Tube feeds infusing. Plan for OR chris for trach/peg and possible wound closure. Diet NPO    Eating:   NPO  Nausea: No  Hypoglycemia and intervention: No  Fever: No  TPN and/or TF: Yes  If yes, type of TF/TPN and rate: TFs at 50 ml/hr     BP (!) 145/67   Pulse 83   Temp 98.7 °F (37.1 °C) (Oral)   Resp 19   Ht 5' 5" (1.651 m)   Wt (!) 150.9 kg (332 lb 10.8 oz)   LMP 01/01/2024 (Approximate) Comment: tubal ligation  SpO2 100%   BMI 55.36 kg/m²     Labs Reviewed and Include    Recent Labs   Lab 02/21/24  0347     110 " "  CALCIUM 6.8*  6.8*   ALBUMIN 1.6*  1.6*   PROT 6.2     140   K 4.0  4.0   CO2 20*  20*   *  114*   BUN 27*  27*   CREATININE 1.9*  1.9*   ALKPHOS 97   ALT 11   AST 29   BILITOT 0.2     Lab Results   Component Value Date    WBC 9.38 02/21/2024    HGB 6.1 (L) 02/21/2024    HCT 20.5 (L) 02/21/2024    MCV 93 02/21/2024     02/21/2024     No results for input(s): "TSH", "FREET4" in the last 168 hours.  Lab Results   Component Value Date    HGBA1C 10.4 (H) 01/30/2024       Nutritional status:   Body mass index is 55.36 kg/m².  Lab Results   Component Value Date    ALBUMIN 1.6 (L) 02/21/2024    ALBUMIN 1.6 (L) 02/21/2024    ALBUMIN 1.9 (L) 02/20/2024     No results found for: "PREALBUMIN"    Estimated Creatinine Clearance: 51.1 mL/min (A) (based on SCr of 1.9 mg/dL (H)).    Accu-Checks  Recent Labs     02/19/24  2006 02/20/24  0021 02/20/24  0313 02/20/24  0801 02/20/24  1231 02/20/24  1639 02/20/24  1925 02/20/24  2317 02/21/24  0345 02/21/24  0802   POCTGLUCOSE 107 153* 146* 136* 190* 158* 141* 144* 126* 137*       Current Medications and/or Treatments Impacting Glycemic Control  Immunotherapy:    Immunosuppressants       None          Steroids:   Hormones (From admission, onward)      None          Pressors:    Autonomic Drugs (From admission, onward)      None          Hyperglycemia/Diabetes Medications:   Antihyperglycemics (From admission, onward)      Start     Stop Route Frequency Ordered    02/13/24 0915  insulin detemir U-100 (Levemir) pen 14 Units         -- SubQ Daily 02/13/24 0906    02/09/24 1200  insulin aspart U-100 pen 6 Units         -- SubQ Every 4 hours 02/09/24 0901    02/03/24 1010  insulin aspart U-100 pen 0-10 Units         -- SubQ Every 4 hours PRN 02/03/24 0911            ASSESSMENT and PLAN    Renal/  * Ayaz's gangrene  Managed per primary team  Optimize BG control        ALVARADO (acute kidney injury)  Lab Results   Component Value Date    CREATININE 1.9 (H) " 02/21/2024    CREATININE 1.9 (H) 02/21/2024     Avoid insulin stacking  Titrate insulin slowly       Endocrine  Morbid (severe) obesity due to excess calories  Body mass index is 55.36 kg/m².  May increase insulin resistance.         New onset type 2 diabetes mellitus  BG goal 140-180  No previous hx of T2DM per family at bedside. A1c of 10.4. DKA at OSH. BG stable on regimen.      Tube feeds will be held at MN for trach/peg chris.     Plan:  - Continue Levemir 14 units daily.  - Continue Novolog 6 units q 4 hrs while on tube feeding (HOLD if tube feeding is stopped or BG < 100)   - Continue Moderate Dose Correction Scale  - BG monitoring q 4 hrs while NPO /TF    ** Please call Endocrine for any BG related issues **      Discharge plans: TBD    Lab Results   Component Value Date    HGBA1C 10.4 (H) 01/30/2024             Zoie Muñiz, NP  Endocrinology  [facility]

## 2024-02-22 ENCOUNTER — ANESTHESIA (OUTPATIENT)
Dept: SURGERY | Facility: HOSPITAL | Age: 54
DRG: 004 | End: 2024-02-22
Payer: MEDICAID

## 2024-02-22 LAB
ALBUMIN SERPL BCP-MCNC: 2 G/DL (ref 3.5–5.2)
ALBUMIN SERPL BCP-MCNC: 2 G/DL (ref 3.5–5.2)
ALBUMIN SERPL BCP-MCNC: 2.1 G/DL (ref 3.5–5.2)
ALP SERPL-CCNC: 126 U/L (ref 55–135)
ALT SERPL W/O P-5'-P-CCNC: 14 U/L (ref 10–44)
ANION GAP SERPL CALC-SCNC: 13 MMOL/L (ref 8–16)
ANION GAP SERPL CALC-SCNC: 7 MMOL/L (ref 8–16)
ANION GAP SERPL CALC-SCNC: 9 MMOL/L (ref 8–16)
AST SERPL-CCNC: 30 U/L (ref 10–40)
BACTERIA SPEC AEROBE CULT: NO GROWTH
BASOPHILS # BLD AUTO: 0.06 K/UL (ref 0–0.2)
BASOPHILS NFR BLD: 0.6 % (ref 0–1.9)
BILIRUB SERPL-MCNC: 0.3 MG/DL (ref 0.1–1)
BUN SERPL-MCNC: 24 MG/DL (ref 6–20)
BUN SERPL-MCNC: 47 MG/DL (ref 6–20)
BUN SERPL-MCNC: 51 MG/DL (ref 6–20)
CALCIUM SERPL-MCNC: 8.9 MG/DL (ref 8.7–10.5)
CALCIUM SERPL-MCNC: 9 MG/DL (ref 8.7–10.5)
CALCIUM SERPL-MCNC: 9.5 MG/DL (ref 8.7–10.5)
CHLORIDE SERPL-SCNC: 105 MMOL/L (ref 95–110)
CHLORIDE SERPL-SCNC: 106 MMOL/L (ref 95–110)
CHLORIDE SERPL-SCNC: 107 MMOL/L (ref 95–110)
CO2 SERPL-SCNC: 20 MMOL/L (ref 23–29)
CO2 SERPL-SCNC: 23 MMOL/L (ref 23–29)
CO2 SERPL-SCNC: 24 MMOL/L (ref 23–29)
CREAT SERPL-MCNC: 2.2 MG/DL (ref 0.5–1.4)
CREAT SERPL-MCNC: 3.6 MG/DL (ref 0.5–1.4)
CREAT SERPL-MCNC: 4.1 MG/DL (ref 0.5–1.4)
DIFFERENTIAL METHOD BLD: ABNORMAL
EOSINOPHIL # BLD AUTO: 0.8 K/UL (ref 0–0.5)
EOSINOPHIL NFR BLD: 7.6 % (ref 0–8)
ERYTHROCYTE [DISTWIDTH] IN BLOOD BY AUTOMATED COUNT: 17.5 % (ref 11.5–14.5)
EST. GFR  (NO RACE VARIABLE): 12.4 ML/MIN/1.73 M^2
EST. GFR  (NO RACE VARIABLE): 14.5 ML/MIN/1.73 M^2
EST. GFR  (NO RACE VARIABLE): 26.2 ML/MIN/1.73 M^2
GLUCOSE SERPL-MCNC: 123 MG/DL (ref 70–110)
GLUCOSE SERPL-MCNC: 124 MG/DL (ref 70–110)
GLUCOSE SERPL-MCNC: 137 MG/DL (ref 70–110)
HCT VFR BLD AUTO: 26.6 % (ref 37–48.5)
HGB BLD-MCNC: 8.3 G/DL (ref 12–16)
IMM GRANULOCYTES # BLD AUTO: 0.03 K/UL (ref 0–0.04)
IMM GRANULOCYTES NFR BLD AUTO: 0.3 % (ref 0–0.5)
LYMPHOCYTES # BLD AUTO: 1.8 K/UL (ref 1–4.8)
LYMPHOCYTES NFR BLD: 17.6 % (ref 18–48)
MAGNESIUM SERPL-MCNC: 1.9 MG/DL (ref 1.6–2.6)
MAGNESIUM SERPL-MCNC: 2.2 MG/DL (ref 1.6–2.6)
MAGNESIUM SERPL-MCNC: 2.2 MG/DL (ref 1.6–2.6)
MCH RBC QN AUTO: 27.9 PG (ref 27–31)
MCHC RBC AUTO-ENTMCNC: 31.2 G/DL (ref 32–36)
MCV RBC AUTO: 89 FL (ref 82–98)
MONOCYTES # BLD AUTO: 1.5 K/UL (ref 0.3–1)
MONOCYTES NFR BLD: 14.2 % (ref 4–15)
NEUTROPHILS # BLD AUTO: 6.2 K/UL (ref 1.8–7.7)
NEUTROPHILS NFR BLD: 59.7 % (ref 38–73)
NRBC BLD-RTO: 0 /100 WBC
PHOSPHATE SERPL-MCNC: 2.6 MG/DL (ref 2.7–4.5)
PHOSPHATE SERPL-MCNC: 4.7 MG/DL (ref 2.7–4.5)
PHOSPHATE SERPL-MCNC: 5.9 MG/DL (ref 2.7–4.5)
PLATELET # BLD AUTO: 318 K/UL (ref 150–450)
PMV BLD AUTO: 9.1 FL (ref 9.2–12.9)
POCT GLUCOSE: 127 MG/DL (ref 70–110)
POCT GLUCOSE: 138 MG/DL (ref 70–110)
POCT GLUCOSE: 139 MG/DL (ref 70–110)
POCT GLUCOSE: 143 MG/DL (ref 70–110)
POCT GLUCOSE: 158 MG/DL (ref 70–110)
POCT GLUCOSE: 160 MG/DL (ref 70–110)
POCT GLUCOSE: 161 MG/DL (ref 70–110)
POTASSIUM SERPL-SCNC: 4.8 MMOL/L (ref 3.5–5.1)
POTASSIUM SERPL-SCNC: 5.1 MMOL/L (ref 3.5–5.1)
POTASSIUM SERPL-SCNC: 5.2 MMOL/L (ref 3.5–5.1)
PROT SERPL-MCNC: 7.7 G/DL (ref 6–8.4)
RBC # BLD AUTO: 2.98 M/UL (ref 4–5.4)
SODIUM SERPL-SCNC: 137 MMOL/L (ref 136–145)
SODIUM SERPL-SCNC: 138 MMOL/L (ref 136–145)
SODIUM SERPL-SCNC: 139 MMOL/L (ref 136–145)
WBC # BLD AUTO: 10.37 K/UL (ref 3.9–12.7)

## 2024-02-22 PROCEDURE — 63600175 PHARM REV CODE 636 W HCPCS: Performed by: NURSE ANESTHETIST, CERTIFIED REGISTERED

## 2024-02-22 PROCEDURE — D9220A PRA ANESTHESIA: Mod: ANES,,, | Performed by: ANESTHESIOLOGY

## 2024-02-22 PROCEDURE — 27000207 HC ISOLATION

## 2024-02-22 PROCEDURE — 63600175 PHARM REV CODE 636 W HCPCS

## 2024-02-22 PROCEDURE — 94003 VENT MGMT INPAT SUBQ DAY: CPT

## 2024-02-22 PROCEDURE — 85025 COMPLETE CBC W/AUTO DIFF WBC: CPT

## 2024-02-22 PROCEDURE — 63600175 PHARM REV CODE 636 W HCPCS: Performed by: HOSPITALIST

## 2024-02-22 PROCEDURE — 99291 CRITICAL CARE FIRST HOUR: CPT | Mod: 24,25,, | Performed by: SURGERY

## 2024-02-22 PROCEDURE — 25000003 PHARM REV CODE 250: Performed by: NURSE ANESTHETIST, CERTIFIED REGISTERED

## 2024-02-22 PROCEDURE — 83735 ASSAY OF MAGNESIUM: CPT | Mod: 91 | Performed by: STUDENT IN AN ORGANIZED HEALTH CARE EDUCATION/TRAINING PROGRAM

## 2024-02-22 PROCEDURE — 20000000 HC ICU ROOM

## 2024-02-22 PROCEDURE — 12020 TX SUPFC WND DEHSN SMPL CLSR: CPT | Mod: 51,59,, | Performed by: STUDENT IN AN ORGANIZED HEALTH CARE EDUCATION/TRAINING PROGRAM

## 2024-02-22 PROCEDURE — 80053 COMPREHEN METABOLIC PANEL: CPT

## 2024-02-22 PROCEDURE — 99232 SBSQ HOSP IP/OBS MODERATE 35: CPT | Mod: ,,, | Performed by: PHYSICIAN ASSISTANT

## 2024-02-22 PROCEDURE — 0B110F4 BYPASS TRACHEA TO CUTANEOUS WITH TRACHEOSTOMY DEVICE, OPEN APPROACH: ICD-10-PCS | Performed by: STUDENT IN AN ORGANIZED HEALTH CARE EDUCATION/TRAINING PROGRAM

## 2024-02-22 PROCEDURE — 99900026 HC AIRWAY MAINTENANCE (STAT)

## 2024-02-22 PROCEDURE — 80069 RENAL FUNCTION PANEL: CPT | Mod: 91 | Performed by: STUDENT IN AN ORGANIZED HEALTH CARE EDUCATION/TRAINING PROGRAM

## 2024-02-22 PROCEDURE — 0DH63UZ INSERTION OF FEEDING DEVICE INTO STOMACH, PERCUTANEOUS APPROACH: ICD-10-PCS | Performed by: STUDENT IN AN ORGANIZED HEALTH CARE EDUCATION/TRAINING PROGRAM

## 2024-02-22 PROCEDURE — 25000003 PHARM REV CODE 250: Performed by: STUDENT IN AN ORGANIZED HEALTH CARE EDUCATION/TRAINING PROGRAM

## 2024-02-22 PROCEDURE — 63600175 PHARM REV CODE 636 W HCPCS: Performed by: STUDENT IN AN ORGANIZED HEALTH CARE EDUCATION/TRAINING PROGRAM

## 2024-02-22 PROCEDURE — 27201109 HC SYSTEM FECAL MANAGEMENT

## 2024-02-22 PROCEDURE — 37000008 HC ANESTHESIA 1ST 15 MINUTES: Performed by: STUDENT IN AN ORGANIZED HEALTH CARE EDUCATION/TRAINING PROGRAM

## 2024-02-22 PROCEDURE — 25000003 PHARM REV CODE 250

## 2024-02-22 PROCEDURE — 37000009 HC ANESTHESIA EA ADD 15 MINS: Performed by: STUDENT IN AN ORGANIZED HEALTH CARE EDUCATION/TRAINING PROGRAM

## 2024-02-22 PROCEDURE — 90945 DIALYSIS ONE EVALUATION: CPT

## 2024-02-22 PROCEDURE — 84100 ASSAY OF PHOSPHORUS: CPT | Performed by: STUDENT IN AN ORGANIZED HEALTH CARE EDUCATION/TRAINING PROGRAM

## 2024-02-22 PROCEDURE — 99232 SBSQ HOSP IP/OBS MODERATE 35: CPT | Mod: ,,, | Performed by: INTERNAL MEDICINE

## 2024-02-22 PROCEDURE — 99900035 HC TECH TIME PER 15 MIN (STAT)

## 2024-02-22 PROCEDURE — 27100171 HC OXYGEN HIGH FLOW UP TO 24 HOURS

## 2024-02-22 PROCEDURE — 43246 EGD PLACE GASTROSTOMY TUBE: CPT | Mod: 51,,, | Performed by: STUDENT IN AN ORGANIZED HEALTH CARE EDUCATION/TRAINING PROGRAM

## 2024-02-22 PROCEDURE — 31600 PLANNED TRACHEOSTOMY: CPT | Mod: ,,, | Performed by: STUDENT IN AN ORGANIZED HEALTH CARE EDUCATION/TRAINING PROGRAM

## 2024-02-22 PROCEDURE — 99024 POSTOP FOLLOW-UP VISIT: CPT | Mod: ,,, | Performed by: STUDENT IN AN ORGANIZED HEALTH CARE EDUCATION/TRAINING PROGRAM

## 2024-02-22 PROCEDURE — 36000706: Performed by: STUDENT IN AN ORGANIZED HEALTH CARE EDUCATION/TRAINING PROGRAM

## 2024-02-22 PROCEDURE — 25000003 PHARM REV CODE 250: Performed by: PHYSICIAN ASSISTANT

## 2024-02-22 PROCEDURE — C9113 INJ PANTOPRAZOLE SODIUM, VIA: HCPCS

## 2024-02-22 PROCEDURE — 94761 N-INVAS EAR/PLS OXIMETRY MLT: CPT

## 2024-02-22 PROCEDURE — 3E0G76Z INTRODUCTION OF NUTRITIONAL SUBSTANCE INTO UPPER GI, VIA NATURAL OR ARTIFICIAL OPENING: ICD-10-PCS | Performed by: STUDENT IN AN ORGANIZED HEALTH CARE EDUCATION/TRAINING PROGRAM

## 2024-02-22 PROCEDURE — 27201423 OPTIME MED/SURG SUP & DEVICES STERILE SUPPLY: Performed by: STUDENT IN AN ORGANIZED HEALTH CARE EDUCATION/TRAINING PROGRAM

## 2024-02-22 PROCEDURE — 83735 ASSAY OF MAGNESIUM: CPT | Performed by: STUDENT IN AN ORGANIZED HEALTH CARE EDUCATION/TRAINING PROGRAM

## 2024-02-22 PROCEDURE — D9220A PRA ANESTHESIA: Mod: CRNA,,, | Performed by: NURSE ANESTHETIST, CERTIFIED REGISTERED

## 2024-02-22 PROCEDURE — 36000707: Performed by: STUDENT IN AN ORGANIZED HEALTH CARE EDUCATION/TRAINING PROGRAM

## 2024-02-22 RX ORDER — IBUPROFEN 200 MG
16 TABLET ORAL
Status: DISCONTINUED | OUTPATIENT
Start: 2024-02-22 | End: 2024-03-05 | Stop reason: HOSPADM

## 2024-02-22 RX ORDER — MAGNESIUM SULFATE HEPTAHYDRATE 40 MG/ML
2 INJECTION, SOLUTION INTRAVENOUS
Status: DISPENSED | OUTPATIENT
Start: 2024-02-22 | End: 2024-02-23

## 2024-02-22 RX ORDER — KETAMINE HCL IN 0.9 % NACL 50 MG/5 ML
SYRINGE (ML) INTRAVENOUS
Status: DISCONTINUED | OUTPATIENT
Start: 2024-02-22 | End: 2024-02-22

## 2024-02-22 RX ORDER — AMLODIPINE BESYLATE 10 MG/1
10 TABLET ORAL DAILY
Status: DISCONTINUED | OUTPATIENT
Start: 2024-02-23 | End: 2024-03-05 | Stop reason: HOSPADM

## 2024-02-22 RX ORDER — CARVEDILOL 25 MG/1
25 TABLET ORAL 2 TIMES DAILY
Status: DISCONTINUED | OUTPATIENT
Start: 2024-02-23 | End: 2024-03-05 | Stop reason: HOSPADM

## 2024-02-22 RX ORDER — IBUPROFEN 200 MG
24 TABLET ORAL
Status: DISCONTINUED | OUTPATIENT
Start: 2024-02-22 | End: 2024-03-05 | Stop reason: HOSPADM

## 2024-02-22 RX ORDER — ROCURONIUM BROMIDE 10 MG/ML
INJECTION, SOLUTION INTRAVENOUS
Status: DISCONTINUED | OUTPATIENT
Start: 2024-02-22 | End: 2024-02-22

## 2024-02-22 RX ORDER — HYDROCODONE BITARTRATE AND ACETAMINOPHEN 500; 5 MG/1; MG/1
TABLET ORAL CONTINUOUS
Status: ACTIVE | OUTPATIENT
Start: 2024-02-22 | End: 2024-02-23

## 2024-02-22 RX ORDER — OXYCODONE HCL 5 MG/5 ML
5 SOLUTION, ORAL ORAL EVERY 6 HOURS PRN
Status: DISCONTINUED | OUTPATIENT
Start: 2024-02-22 | End: 2024-02-26

## 2024-02-22 RX ORDER — POLYETHYLENE GLYCOL 3350 17 G/17G
17 POWDER, FOR SOLUTION ORAL DAILY
Status: DISCONTINUED | OUTPATIENT
Start: 2024-02-23 | End: 2024-02-23

## 2024-02-22 RX ORDER — PANTOPRAZOLE SODIUM 40 MG/10ML
40 INJECTION, POWDER, LYOPHILIZED, FOR SOLUTION INTRAVENOUS DAILY
Status: DISCONTINUED | OUTPATIENT
Start: 2024-02-22 | End: 2024-02-23

## 2024-02-22 RX ORDER — SEVELAMER CARBONATE FOR ORAL SUSPENSION 800 MG/1
1.6 POWDER, FOR SUSPENSION ORAL 3 TIMES DAILY
Status: DISCONTINUED | OUTPATIENT
Start: 2024-02-23 | End: 2024-03-05 | Stop reason: HOSPADM

## 2024-02-22 RX ORDER — MIDAZOLAM HYDROCHLORIDE 1 MG/ML
INJECTION INTRAMUSCULAR; INTRAVENOUS
Status: DISCONTINUED | OUTPATIENT
Start: 2024-02-22 | End: 2024-02-22

## 2024-02-22 RX ORDER — CALCIUM CARBONATE 200(500)MG
500 TABLET,CHEWABLE ORAL 2 TIMES DAILY
Status: DISCONTINUED | OUTPATIENT
Start: 2024-02-23 | End: 2024-02-23

## 2024-02-22 RX ORDER — ACETAMINOPHEN 325 MG/1
650 TABLET ORAL EVERY 4 HOURS PRN
Status: DISCONTINUED | OUTPATIENT
Start: 2024-02-22 | End: 2024-03-05 | Stop reason: HOSPADM

## 2024-02-22 RX ADMIN — SUGAMMADEX 200 MG: 100 INJECTION, SOLUTION INTRAVENOUS at 02:02

## 2024-02-22 RX ADMIN — HEPARIN SODIUM 7500 UNITS: 5000 INJECTION INTRAVENOUS; SUBCUTANEOUS at 05:02

## 2024-02-22 RX ADMIN — Medication 10 MG: at 01:02

## 2024-02-22 RX ADMIN — FUROSEMIDE 120 MG: 10 INJECTION, SOLUTION INTRAVENOUS at 08:02

## 2024-02-22 RX ADMIN — HYDRALAZINE HYDROCHLORIDE 10 MG: 20 INJECTION, SOLUTION INTRAMUSCULAR; INTRAVENOUS at 07:02

## 2024-02-22 RX ADMIN — HEPARIN SODIUM 7500 UNITS: 5000 INJECTION INTRAVENOUS; SUBCUTANEOUS at 03:02

## 2024-02-22 RX ADMIN — SODIUM CHLORIDE: 0.9 INJECTION, SOLUTION INTRAVENOUS at 12:02

## 2024-02-22 RX ADMIN — ROCURONIUM BROMIDE 50 MG: 10 INJECTION, SOLUTION INTRAVENOUS at 12:02

## 2024-02-22 RX ADMIN — CALCIUM CARBONATE (ANTACID) CHEW TAB 500 MG 500 MG: 500 CHEW TAB at 08:02

## 2024-02-22 RX ADMIN — INSULIN DETEMIR 14 UNITS: 100 INJECTION, SOLUTION SUBCUTANEOUS at 08:02

## 2024-02-22 RX ADMIN — PANTOPRAZOLE SODIUM 40 MG: 40 INJECTION, POWDER, FOR SOLUTION INTRAVENOUS at 08:02

## 2024-02-22 RX ADMIN — SEVELAMER CARBONATE 1.6 G: 800 POWDER, FOR SUSPENSION ORAL at 08:02

## 2024-02-22 RX ADMIN — PIPERACILLIN SODIUM AND TAZOBACTAM SODIUM 4.5 G: 4; .5 INJECTION, POWDER, FOR SOLUTION INTRAVENOUS at 06:02

## 2024-02-22 RX ADMIN — INSULIN ASPART 6 UNITS: 100 INJECTION, SOLUTION INTRAVENOUS; SUBCUTANEOUS at 11:02

## 2024-02-22 RX ADMIN — Medication 20 MG: at 01:02

## 2024-02-22 RX ADMIN — AMLODIPINE BESYLATE 10 MG: 10 TABLET ORAL at 08:02

## 2024-02-22 RX ADMIN — CARVEDILOL 25 MG: 25 TABLET, FILM COATED ORAL at 08:02

## 2024-02-22 RX ADMIN — SODIUM CHLORIDE: 9 INJECTION, SOLUTION INTRAVENOUS at 04:02

## 2024-02-22 RX ADMIN — MIDAZOLAM HYDROCHLORIDE 2 MG: 1 INJECTION, SOLUTION INTRAMUSCULAR; INTRAVENOUS at 12:02

## 2024-02-22 RX ADMIN — FUROSEMIDE 120 MG: 10 INJECTION, SOLUTION INTRAVENOUS at 03:02

## 2024-02-22 RX ADMIN — PIPERACILLIN SODIUM AND TAZOBACTAM SODIUM 4.5 G: 4; .5 INJECTION, POWDER, FOR SOLUTION INTRAVENOUS at 01:02

## 2024-02-22 RX ADMIN — INSULIN ASPART 2 UNITS: 100 INJECTION, SOLUTION INTRAVENOUS; SUBCUTANEOUS at 11:02

## 2024-02-22 RX ADMIN — HEPARIN SODIUM 7500 UNITS: 5000 INJECTION INTRAVENOUS; SUBCUTANEOUS at 09:02

## 2024-02-22 RX ADMIN — MAGNESIUM SULFATE HEPTAHYDRATE 2 G: 40 INJECTION, SOLUTION INTRAVENOUS at 10:02

## 2024-02-22 RX ADMIN — ROCURONIUM BROMIDE 40 MG: 10 INJECTION, SOLUTION INTRAVENOUS at 01:02

## 2024-02-22 RX ADMIN — PIPERACILLIN SODIUM AND TAZOBACTAM SODIUM 4.5 G: 4; .5 INJECTION, POWDER, FOR SOLUTION INTRAVENOUS at 08:02

## 2024-02-22 RX ADMIN — Medication 20 MG: at 12:02

## 2024-02-22 NOTE — ASSESSMENT & PLAN NOTE
Transfer from [facility]. Admitted for fourniers gangrene. Initiated HD on 1/31. ALVARADO 2/2 ATN in setting of septic shock. Arrived with CR 3.8. Unknown baseline. S/P OR debridement with possible closure and wound vac placement     ALVARADO most likley ATN in setting of septic shock     Plan/Recommendation  - Plan for 8hrs sled session today.   - Please consult IR for perm cath placement if clear from infection stand point.   - Please dose all ABX in accordance to RRT schedule (zosyn, etc)  - Continue IV lasix 120mg TID.   -Daily RFP   -Strict I&Os  -Avoid nephrotoxins, if possible

## 2024-02-22 NOTE — PROGRESS NOTES
"[facility]  Endocrinology  Progress Note    Admit Date: 1/29/2024     Reason for Consult: Management of T2DM, Hyperglycemia     Surgical Procedure and Date: n/a    Diabetes diagnosis year: new onset     Home Diabetes Medications:  none per chart review and family present at bedside     Lab Results   Component Value Date    HGBA1C 10.4 (H) 01/30/2024       Diabetes Complications include:     Hyperglycemia, DKA at OSH     Complicating diabetes co morbidities:   Obesity       HPI:   Patient is a 53 y.o. female with a diagnosis of obesity (BMI 53) admitted with N/V and R groin wound found to be in DKA at OSH. 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 While at OSH pt developed acute respiratory failure requiring intubation. Pulmonology was consulted for ventilator management and critical illness. Nephrology was consulted for worsening vishal in the setting of lactic acidosis and septic shock likely ATN, sCr elevated at 3.8 on admit now 4.0 post HD. Pt being admitted to SICU for surgical critical care management. Immediate plans include hemodynamic stabilization, weaning of respiratory support, and RRT.  Endocrinology consulted for management of T2DM.                 Interval HPI:   No acute events overnight. Patient in room 86280/71107 A. Blood glucose stable. BG at goal on current insulin regimen (Schedule Insulin and SSI (TPN/TF)). Steroid use- None .   6 Days Post-Op  Renal function- Abnormal - Cr 3.6   Vasopressors-  None     Diet NPO     Eating:   NPO  Nausea: No  Hypoglycemia and intervention: No  Fever: No  TPN and/or TF: Yes  If yes, type of TF/TPN and rate: on hold for sx.    BP (!) 146/69 (BP Location: Left arm, Patient Position: Lying)   Pulse 90   Temp 99 °F (37.2 °C) (Oral)   Resp (!) 22   Ht 5' 5" (1.651 m)   Wt (!) 150.9 kg (332 lb 10.8 oz)   LMP 01/01/2024 (Approximate) Comment: " "tubal ligation  SpO2 100%   BMI 55.36 kg/m²     Labs Reviewed and Include    Recent Labs   Lab 02/22/24  0209   *   CALCIUM 9.0   ALBUMIN 2.0*   PROT 7.7      K 5.1   CO2 24      BUN 47*   CREATININE 3.6*   ALKPHOS 126   ALT 14   AST 30   BILITOT 0.3     Lab Results   Component Value Date    WBC 10.37 02/22/2024    HGB 8.3 (L) 02/22/2024    HCT 26.6 (L) 02/22/2024    MCV 89 02/22/2024     02/22/2024     No results for input(s): "TSH", "FREET4" in the last 168 hours.  Lab Results   Component Value Date    HGBA1C 10.4 (H) 01/30/2024       Nutritional status:   Body mass index is 55.36 kg/m².  Lab Results   Component Value Date    ALBUMIN 2.0 (L) 02/22/2024    ALBUMIN 2.0 (L) 02/21/2024    ALBUMIN 1.6 (L) 02/21/2024    ALBUMIN 1.6 (L) 02/21/2024     No results found for: "PREALBUMIN"    Estimated Creatinine Clearance: 27 mL/min (A) (based on SCr of 3.6 mg/dL (H)).    Accu-Checks  Recent Labs     02/20/24  1925 02/20/24  2317 02/21/24  0345 02/21/24  0802 02/21/24  1154 02/21/24  1547 02/21/24  1905 02/21/24  2305 02/22/24  0209 02/22/24  0808   POCTGLUCOSE 141* 144* 126* 137* 168* 166* 166* 158* 138* 139*       Current Medications and/or Treatments Impacting Glycemic Control  Immunotherapy:    Immunosuppressants       None          Steroids:   Hormones (From admission, onward)      None          Pressors:    Autonomic Drugs (From admission, onward)      None          Hyperglycemia/Diabetes Medications:   Antihyperglycemics (From admission, onward)      Start     Stop Route Frequency Ordered    02/13/24 0915  insulin detemir U-100 (Levemir) pen 14 Units         -- SubQ Daily 02/13/24 0906    02/09/24 1200  insulin aspart U-100 pen 6 Units         -- SubQ Every 4 hours 02/09/24 0901    02/03/24 1010  insulin aspart U-100 pen 0-10 Units         -- SubQ Every 4 hours PRN 02/03/24 0911            ASSESSMENT and PLAN    Renal/  * Ayaz's gangrene  Managed per primary team  Optimize BG " control        ALVARADO (acute kidney injury)  Lab Results   Component Value Date    CREATININE 3.6 (H) 02/22/2024     Avoid insulin stacking  Titrate insulin slowly       Endocrine  New onset type 2 diabetes mellitus  BG goal 140-180  No previous hx of T2DM per family at bedside. A1c of 10.4. DKA at OSH. BG stable on regimen.      Tube feeds held at MN for trach/peg      Plan:  - Continue Levemir 14 units daily.  - Continue Novolog 6 units q 4 hrs while on tube feeding (HOLD if tube feeding is stopped or BG < 100)   - Continue Moderate Dose Correction Scale  - BG monitoring q 4 hrs while NPO /TF    ** Please call Endocrine for any BG related issues **      Discharge plans: TBD    Lab Results   Component Value Date    HGBA1C 10.4 (H) 01/30/2024             Salvador Alfaro PA-C  Endocrinology  [facility]

## 2024-02-22 NOTE — PROGRESS NOTES
Pt returned from OR post trach/PEG, wound closure. Vent on spontaneous 10/5. SR. VSS. PEG tube can be used after 6 hours. Glynn in place. Rectal tube in place. Wound vac. Pt to be started on dialysis soon. Daughter at bedside. All updates given.

## 2024-02-22 NOTE — ASSESSMENT & PLAN NOTE
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 Unfortunately, has multiple infarcts on MRI brain with unchanged neuro status, however, family would like to proceed with all measures    - Last [place] change 2/19  - Plan for trach/PEG and partial wound closure today   - Hold TF at MN   - LP 2/16 - Specimen for cultures is missing.   - ID consulted for urine cultures - recommend broadening to zosyn    - UA growing Burkholderia species and Chryseibacterium Indologenes, recommend IV zosyn with stop date of 2/22  - Palliative following given overall poor prognosis, daughter would like everything done  - Stable on spontaneous   - Okay for DVT ppx   - Remainder of care per SICU    Dispo: will need LTAC placement

## 2024-02-22 NOTE — SUBJECTIVE & OBJECTIVE
Interval History/Significant Events: Pt seen and examined at bedside. BISHNU MULLER.    Follow-up For: Procedure(s) (LRB):  Lumbar Puncture (N/A)    Post-Operative Day: 6 Days Post-Op    Objective:     Vital Signs (Most Recent):  Temp: 99.7 °F (37.6 °C) (02/22/24 0300)  Pulse: 90 (02/22/24 0645)  Resp: (!) 23 (02/22/24 0645)  BP: (!) 153/71 (02/22/24 0645)  SpO2: 100 % (02/22/24 0645) Vital Signs (24h Range):  Temp:  [98.6 °F (37 °C)-99.9 °F (37.7 °C)] 99.7 °F (37.6 °C)  Pulse:  [80-92] 90  Resp:  [17-29] 23  SpO2:  [100 %] 100 %  BP: (127-158)/(59-74) 153/71     Weight: (!) 150.9 kg (332 lb 10.8 oz)  Body mass index is 55.36 kg/m².      Intake/Output Summary (Last 24 hours) at 2/22/2024 0719  Last data filed at 2/22/2024 0600  Gross per 24 hour   Intake 1523.89 ml   Output 1290 ml   Net 233.89 ml          Physical Exam   Vitals and nursing note reviewed.   Constitutional:       General: She is not in acute distress.     Appearance: She is ill-appearing.   HENT:      Head: Normocephalic and atraumatic.   Eyes:      Extraocular Movements: Extraocular movements intact.      Conjunctiva/sclera: Conjunctivae normal.   Neck:      Comments: Left IJ Trialysis catheter  Cardiovascular:      Rate and Rhythm: Normal rate and regular rhythm.   Pulmonary:      Effort: Pulmonary effort is normal.      Comments: Intubated, on spontaneous     Abdominal:      General: There is no distension.      Palpations: Abdomen is soft.      Tenderness: There is no abdominal tenderness.   Genitourinary:     Comments: Glynn in place     Skin:     General: Skin is warm and dry.      Comments: Wound vac in place to R thigh/groin to suction   Neurological:      General: No focal deficit present.      Mental Status: She is oriented to person, place, and time.   Psychiatric:         Mood and Affect: Mood normal.         Behavior: Behavior normal.     Vents:  Vent Mode: Spont (02/21/24 4811)  Set Rate: 18 BPM (02/06/24 0349)  Vt Set: 420 mL (02/06/24  0349)  Pressure Support: 10 cmH20 (02/21/24 2308)  PEEP/CPAP: 8 cmH20 (02/21/24 2308)  Oxygen Concentration (%): 30 (02/22/24 0645)  Peak Airway Pressure: 19 cmH20 (02/21/24 2308)  Plateau Pressure: 15 cmH20 (02/21/24 2308)  Total Ve: 8.87 L/m (02/21/24 2308)  Negative Inspiratory Force (cm H2O): 0 (02/21/24 2308)  F/VT Ratio<105 (RSBI): (!) 64.69 (02/21/24 2308)    Lines/Drains/Airways       Central Venous Catheter Line  Duration             Trialysis (Dialysis) Catheter 02/11/24 2303 left internal jugular 10 days              Drain  Duration                  NG/OG Tube 02/06/24 1030 Center mouth 15 days         Urethral Catheter 02/16/24 1215 5 days              Airway  Duration                  Airway - Non-Surgical 01/31/24 1020 21 days              Peripheral Intravenous Line  Duration                  Peripheral IV - Single Lumen 02/22/24 0015 22 G Left;Posterior Forearm <1 day         Peripheral IV - Single Lumen 02/22/24 0020 20 G Posterior;Right Forearm <1 day                    Significant Labs:    CBC/Anemia Profile:  Recent Labs   Lab 02/21/24 0347 02/21/24  1414 02/22/24  0209   WBC 9.38 9.39 10.37   HGB 6.1* 8.2* 8.3*   HCT 20.5* 26.3* 26.6*    309 318   MCV 93 90 89   RDW 17.7* 17.1* 17.5*        Chemistries:  Recent Labs   Lab 02/21/24 0347 02/21/24  1414 02/22/24  0209     140 136 139   K 4.0  4.0 4.8 5.1   *  114* 105 106   CO2 20*  20* 22* 24   BUN 27*  27* 37* 47*   CREATININE 1.9*  1.9* 3.0* 3.6*   CALCIUM 6.8*  6.8* 8.9 9.0   ALBUMIN 1.6*  1.6* 2.0* 2.0*   PROT 6.2  --  7.7   BILITOT 0.2  --  0.3   ALKPHOS 97  --  126   ALT 11  --  14   AST 29  --  30   MG 1.5* 2.1 2.2   PHOS 2.6* 4.2 4.7*       Significant Imaging:  I have reviewed all pertinent imaging results/findings within the past 24 hours.

## 2024-02-22 NOTE — ASSESSMENT & PLAN NOTE
Neuro/Psych:   #Acute Encephalopathy  CTH 2/3 with scattered hypoattenuation likely representing age indeterminate infarcts. EEG findings consistent with moderate-severe encephalopathy. MRI 2/6: Several scattered punctate acute infarcts throughout the bilateral frontal lobes, centrum semiovale, basal ganglia, corpus callosum, and cerebellar hemispheres.  CTA 2/6: Atherosclerotic plaquing of the carotid bifurcations and proximal ICAs with less than 50% proximal ICA stenosis by NASCET criteria . Repeat CTH and MRI/MRA unchanged from prior exams. S/p multiple wound vac exchanges. Palliative onboard with family wanting full measures.    Neuro/Psych  Repeat CTH and MRI/MRA stable from initial reads. LP 2/16. Elevated WBCs and protein consistent with bacterial meningitis.  -- Sedation: None  -- Pain: kermit tylenol, dialudid and oxy prn  -- GCS 4T: E2, V1T, M1; exam stable  -- Neurology following; appreciate recs  -- Neurovascular following previously; signed off 2/17  -- Palliative following; GOC conversation 2/7; appreciate recs  -- CSF culture with no growth, awaiting further cultures             Cards:   -- HDS  -- goal SBP < 180, MAP >65  -- normotensive; hydralazine and labetalol prns  -- Continue amlodipine 10mg daily; coreg 25mg BID      Pulm:   #Acute Respiratory Failure  -- Intubated on spontaneous , Pressure support.  -- Goal O2 sat > 90%  -- Trach/PEG today      Renal:  #ALVARADO on CKD  #ATN 2/2 septic shock  2/13 LI trialysis  -- Nephrology following; appreciate recs  -- On sevelamer  -- Replace lytes as needed.  -- Continue lasix 120mg IV tid    Recent Labs   Lab 02/21/24  0347 02/21/24  1414 02/22/24  0209   BUN 27*  27* 37* 47*   CREATININE 1.9*  1.9* 3.0* 3.6*        FEN / GI:   -- Daily CMPs  -- NGT in place   -- Replace lytes as needed  -- Nutrition: NPO, TF (Peptamen Intense) at goal 30 cc/hr.    -- GI PPX   -- Nutrition following; appreciate recs  -- Continue psyllium and sevelamer      ID:     #Ayaz's gangrene  s/p wound vac exchange/ debridement x4 for nec fascitis. R groin tissue with proteus, sensitive to ceftriaxone. Growing bacteroids as well. Urine cx 2/18 growing C. Indologens.  -- Abx: zosyn, EOT 2/22   -- If febrile, transition back to ceftriaxone and flagyl. If concerned with UTI, add on bactrim for 3 day course.  -- ID following ; appreciate recs     Heme/Onc:  #Anemia  -- Hgb 8.3  -- Daily CBC  -- Heparin DVT ppx  -- Transfuse if Hgb <7     Endo:   #IDDM  Initially presented to OSH with DKA with latest A1C 10.4 AG Gap resolved  Placed on insulin gtt 2/3 and dced 2/12.    - q4h BG monitoring while NPO  - Detemir 14 units daily  - Novolog 6units q4h while on TFs  - Moderate dose SSI PRN  - Endocrine following, appreciate recs      PPx:   Feeding: Tube Feeds (held, OR today)  Analgesia/Sedation: See above  Thromboembolic prevention: SQH   HOB >30: Y  Stress Ulcer ppx: Famotidine  Glucose control: Goal 140-180 g/dl, kermit detemir and novolog, SSI, Endo following  Bowel reg: psyllium  Invasive Lines/Drains/Airway: Glynn, ETT, LIJ Trialysis, PIV x2      Dispo/Code Status/Palliative:   -- SICU / Full Code   -- PEG/Trach today

## 2024-02-22 NOTE — NURSING
Dr. Rousseau contacted regarding right groin wound vac.  MD at bedside to assess patient.  Orders received to remove wound vac dressing to right groin, apply wet to dry dressing, and place fecal management system for stool containment.  Will continue to monitor.

## 2024-02-22 NOTE — ASSESSMENT & PLAN NOTE
Lab Results   Component Value Date    CREATININE 3.6 (H) 02/22/2024     Avoid insulin stacking  Titrate insulin slowly

## 2024-02-22 NOTE — PLAN OF CARE
"      SICU PLAN OF CARE NOTE    Dx: Ayaz's gangrene    Shift Events: No acute events throughout shift. Patient remains on PS +10. TF continued at goal of 50 ml/hr. Neuro status unchanged.     Goals of Care: SBP < 170. MAP > 65.    Neuro: Arouses to Voice Intermittently withdraws to pain in LLE. No response to pain in other extremities.     Vital Signs: BP (!) 145/67   Pulse 85   Temp 98.6 °F (37 °C) (Oral)   Resp (!) 25   Ht 5' 5" (1.651 m)   Wt (!) 150.9 kg (332 lb 10.8 oz)   LMP 01/01/2024 (Approximate) Comment: tubal ligation  SpO2 100%   BMI 55.36 kg/m²     Respiratory:       Vent Mode: Spont  Set Rate: 18 BPM  Oxygen Concentration (%): 30  Vt Set: 420 mL  PEEP/CPAP: 8 cmH20  Pressure Support: 10 cmH20     Diet: Tube Feeds      Urine Output: Urinary Catheter 475 cc/shift    Drains:   Wound Vac 75 cc /shift, serosang       Labs:  Recent Labs   Lab 02/21/24  1414   WBC 9.39   RBC 2.93*   HGB 8.2*   HCT 26.3*         Recent Labs   Lab 02/21/24  0347 02/21/24  1414     140 136   K 4.0  4.0 4.8   CO2 20*  20* 22*   *  114* 105   BUN 27*  27* 37*   CREATININE 1.9*  1.9* 3.0*   ALKPHOS 97  --    ALT 11  --    AST 29  --    BILITOT 0.2  --       Recent Labs   Lab 02/15/24  1429   APTT 26.2    No results for input(s): "CPK", "CPKMB", "TROPONINI", "MB" in the last 168 hours.     Accuchecks:  Q4H    Skin: Patient turned Q2H. Heel offloading boots in place. No new skin breakdown noted.     See flowsheets and results review for full assessment and details.       "

## 2024-02-22 NOTE — PLAN OF CARE
Patient presents back to SICU s/p Trach and PEG placement. HDS. Ok to use gtube for meds now and feeds in 6 hours. Will wean vent and transition to trach collar as tolerated.    Chirag Borges MD   General Surgery PGY2

## 2024-02-22 NOTE — PROGRESS NOTES
[facility]ensive [facility]  Critical Care - Surgery  Progress Note    Patient Name: Sarah Saravia  MRN: [id number]  Admission Date: 1/29/2024  Hospital Length of Stay: 24 days  Code Status: Full Code  Attending Provider: Steve Cole MD  [facility]   Principal Problem: Ayaz's gangrene    Subjective:     Hospital/ICU Course:  No notes on file    Interval History/Significant Events: Pt seen and examined at bedside. BISHNU MULLER.    Follow-up For: Procedure(s) (LRB):  Lumbar Puncture (N/A)    Post-Operative Day: 6 Days Post-Op    Objective:     Vital Signs (Most Recent):  Temp: 99.7 °F (37.6 °C) (02/22/24 0300)  Pulse: 90 (02/22/24 0645)  Resp: (!) 23 (02/22/24 0645)  BP: (!) 153/71 (02/22/24 0645)  SpO2: 100 % (02/22/24 0645) Vital Signs (24h Range):  Temp:  [98.6 °F (37 °C)-99.9 °F (37.7 °C)] 99.7 °F (37.6 °C)  Pulse:  [80-92] 90  Resp:  [17-29] 23  SpO2:  [100 %] 100 %  BP: (127-158)/(59-74) 153/71     Weight: (!) 150.9 kg (332 lb 10.8 oz)  Body mass index is 55.36 kg/m².      Intake/Output Summary (Last 24 hours) at 2/22/2024 0719  Last data filed at 2/22/2024 0600  Gross per 24 hour   Intake 1523.89 ml   Output 1290 ml   Net 233.89 ml          Physical Exam   Vitals and nursing note reviewed.   Constitutional:       General: She is not in acute distress.     Appearance: She is ill-appearing.   HENT:      Head: Normocephalic and atraumatic.   Eyes:      Extraocular Movements: Extraocular movements intact.      Conjunctiva/sclera: Conjunctivae normal.   Neck:      Comments: Left IJ Trialysis catheter  Cardiovascular:      Rate and Rhythm: Normal rate and regular rhythm.   Pulmonary:      Effort: Pulmonary effort is normal.      Comments: Intubated, on spontaneous     Abdominal:      General: There is no distension.      Palpations: Abdomen is soft.      Tenderness: There is no abdominal tenderness.   Genitourinary:     Comments: Glynn in place      Skin:     General: Skin is warm and dry.      Comments: Wound vac in place to R thigh/groin to suction   Neurological:      General: No focal deficit present.      Mental Status: She is oriented to person, place, and time.   Psychiatric:         Mood and Affect: Mood normal.         Behavior: Behavior normal.     Vents:  Vent Mode: Spont (02/21/24 2308)  Set Rate: 18 BPM (02/06/24 0349)  Vt Set: 420 mL (02/06/24 0349)  Pressure Support: 10 cmH20 (02/21/24 2308)  PEEP/CPAP: 8 cmH20 (02/21/24 2308)  Oxygen Concentration (%): 30 (02/22/24 0645)  Peak Airway Pressure: 19 cmH20 (02/21/24 2308)  Plateau Pressure: 15 cmH20 (02/21/24 2308)  Total Ve: 8.87 L/m (02/21/24 2308)  Negative Inspiratory Force (cm H2O): 0 (02/21/24 2308)  F/VT Ratio<105 (RSBI): (!) 64.69 (02/21/24 2308)    Lines/Drains/Airways       Central Venous Catheter Line  Duration             Trialysis (Dialysis) Catheter 02/11/24 2303 left internal jugular 10 days              Drain  Duration                  NG/OG Tube 02/06/24 1030 Center mouth 15 days         Urethral Catheter 02/16/24 1215 5 days              Airway  Duration                  Airway - Non-Surgical 01/31/24 1020 21 days              Peripheral Intravenous Line  Duration                  Peripheral IV - Single Lumen 02/22/24 0015 22 G Left;Posterior Forearm <1 day         Peripheral IV - Single Lumen 02/22/24 0020 20 G Posterior;Right Forearm <1 day                    Significant Labs:    CBC/Anemia Profile:  Recent Labs   Lab 02/21/24  0347 02/21/24  1414 02/22/24  0209   WBC 9.38 9.39 10.37   HGB 6.1* 8.2* 8.3*   HCT 20.5* 26.3* 26.6*    309 318   MCV 93 90 89   RDW 17.7* 17.1* 17.5*        Chemistries:  Recent Labs   Lab 02/21/24  0347 02/21/24  1414 02/22/24  0209     140 136 139   K 4.0  4.0 4.8 5.1   *  114* 105 106   CO2 20*  20* 22* 24   BUN 27*  27* 37* 47*   CREATININE 1.9*  1.9* 3.0* 3.6*   CALCIUM 6.8*  6.8* 8.9 9.0   ALBUMIN 1.6*  1.6* 2.0* 2.0*    PROT 6.2  --  7.7   BILITOT 0.2  --  0.3   ALKPHOS 97  --  126   ALT 11  --  14   AST 29  --  30   MG 1.5* 2.1 2.2   PHOS 2.6* 4.2 4.7*       Significant Imaging:  I have reviewed all pertinent imaging results/findings within the past 24 hours.  Assessment/Plan:     Renal/  * Ayaz's gangrene    Neuro/Psych:   #Acute Encephalopathy  CTH 2/3 with scattered hypoattenuation likely representing age indeterminate infarcts. EEG findings consistent with moderate-severe encephalopathy. MRI 2/6: Several scattered punctate acute infarcts throughout the bilateral frontal lobes, centrum semiovale, basal ganglia, corpus callosum, and cerebellar hemispheres.  CTA 2/6: Atherosclerotic plaquing of the carotid bifurcations and proximal ICAs with less than 50% proximal ICA stenosis by NASCET criteria . Repeat CTH and MRI/MRA unchanged from prior exams. S/p multiple wound vac exchanges. Palliative onboard with family wanting full measures.    Neuro/Psych  Repeat CTH and MRI/MRA stable from initial reads. LP 2/16. Elevated WBCs and protein consistent with bacterial meningitis.  -- Sedation: None  -- Pain: kermit tylenol, dialudid and oxy prn  -- GCS 5T: E2, V1T, M2; exam stable  -- Neurology following; appreciate recs  -- Neurovascular following previously; signed off 2/17  -- Palliative following; GOC conversation 2/7; appreciate recs  -- CSF culture with no growth, awaiting further cultures             Cards:   -- HDS  -- goal SBP < 180, MAP >65  -- normotensive; hydralazine and labetalol prns  -- Continue amlodipine 10mg daily; coreg 25mg BID      Pulm:   #Acute Respiratory Failure  -- Intubated on spontaneous , Pressure support.  -- Goal O2 sat > 90%  -- Trach/PEG this Thursday 2/22      Renal:  #ALVARADO on CKD  #ATN 2/2 septic shock  2/13 [facility] trialysis  -- Received SLED overnight.  -- Nephrology following; appreciate recs  -- On sevelamer  -- Replace lytes as needed.  -- Continue lasix 120mg IV tid    Recent Labs   Lab  02/21/24  0347 02/21/24  1414 02/22/24  0209   BUN 27*  27* 37* 47*   CREATININE 1.9*  1.9* 3.0* 3.6*        FEN / GI:   -- Daily CMPs  -- NGT in place   -- Replace lytes as needed  -- Nutrition: NPO, TF (Novasource Renal) at goal 30 cc/hr.    -- GI PPX   -- Nutrition following; appreciate recs  -- Continue psyllium and sevelamer      ID:    #Ayaz's gangrene  s/p wound vac exchange/ debridement x4 for nec fascitis. R groin tissue with proteus, sensitive to ceftriaxone. Growing bacteroids as well. Urine cx 2/18 growing C. Indologens.  -- Abx: transitioned to zosyn 2/19 from ceftriaxone and flagyl. Same EOT 2/22   -- If febrile, transition back to ceftriaxone and flagyl. If concerned with UTI, add on bactrim for 3 day course.  -- ID following ; appreciate recs     Heme/Onc:  #Anemia  -- Hgb 6.1, transfused 1 unit pRBC  -- Daily CBC  -- Heparin DVT ppx  -- Transfuse if Hgb <7     Endo:   #IDDM  Initially presented to OSH with DKA with latest A1C 10.4 AG Gap resolved  Placed on insulin gtt 2/3 and dced 2/12.    - q4h BG monitoring while NPO  - Detemir 14 units daily  - Novolog 6units q4h while on TFs  - Moderate dose SSI PRN  - Endocrine following, appreciate recs      PPx:   Feeding: Tube Feeds  Analgesia/Sedation: See above  Thromboembolic prevention: SQH   HOB >30: Y  Stress Ulcer ppx: Famotidine  Glucose control: Goal 140-180 g/dl, kermit detemir and novolog, SSI, Endo following  Bowel reg: psyllium  Invasive Lines/Drains/Airway: Glynn, ETT, LIJ Trialysis, PIV x2      Dispo/Code Status/Palliative:   -- SICU / Full Code   -- PEG/Trach Thursday         Critical Care Daily Checklist:    A: Awake: RASS Goal/Actual Goal: RASS Goal: 0-->alert and calm  Actual:     B: Spontaneous Breathing Trial Performed? Spon. Breathing Trial Initiated?: Initiated (02/11/24 0720)   C: SAT & SBT Coordinated?  ***                      D: Delirium: CAM-ICU Overall CAM-ICU: Positive   E: Early Mobility Performed? {YES,NO:98700}   F:  Feeding Goal: Goals: Meet % EEN/EPN by follow-up date.  Status: Nutrition Goal Status: progressing towards goal   Current Diet Order   Procedures    Diet NPO      AS: Analgesia/Sedation ***   T: Thromboembolic Prophylaxis ***   H: HOB > 300 {YES,NO:52770}   U: Stress Ulcer Prophylaxis (if needed) ***   G: Glucose Control ***   B: Bowel Function Stool Occurrence: 1   I: Indwelling Catheter (Lines & Glynn) Necessity ***   D: De-escalation of Antimicrobials/Pharmacotherapies ***    Plan for the day/ETD ***    Code Status:  Family/Goals of Care: Full Code  ***       Critical secondary to {:10886}     Critical care was time spent personally by me on the following activities: development of treatment plan with patient or surrogate and bedside caregivers, discussions with consultants, evaluation of patient's response to treatment, examination of patient, ordering and performing treatments and interventions, ordering and review of laboratory studies, ordering and review of radiographic studies, pulse oximetry, re-evaluation of patient's condition.  This critical care time did not overlap with that of any other provider or involve time for any procedures.     Robinson Rousseau MD  Critical Care - Surgery  [facility]

## 2024-02-22 NOTE — PLAN OF CARE
SICU PLAN OF CARE    Dx: Ayaz's gangrene    Goals of Care: SBP <170, MAP >65    Vital Signs (last 12 hours):   Temp:  [98.7 °F (37.1 °C)-99.9 °F (37.7 °C)]   Pulse:  [83-89]   Resp:  [17-29]   BP: (128-146)/(60-70)   SpO2:  [100 %]      Neuro: Arouses to Pain, Intubated , and Unresponsive    Cardiac: NSR    Respiratory: Ventilator; Mode: Spontaneous, 30% FiO2, 8 PEEP    Urine Output: Urethral Catheter  585 mL/shift    Drains: Wound Vac, total output 100mL/shift    Diet: NPO      Labs/Accuchecks: Accuchecks Q4.    Skin: Wounds and incisions per documentation. Turn Q2.    Shift Events:  NAEON. VSS. Neuro status unchanged. UOP adequate (60-75cc/hr). Wound vac output minimal, seal appears intact. 1 BM overnight. TF held @ MN.

## 2024-02-22 NOTE — SUBJECTIVE & OBJECTIVE
Interval History: NAEON. Afebrile. HDS. Vent at 30/8. TF held. To OR today for trach, PEG, partial wound closure  Neuro status unchanged    Medications:  Continuous Infusions:   dextrose 10 % in water (D10W)       Scheduled Meds:   amLODIPine  10 mg Per OG tube Daily    calcium carbonate  500 mg Per NG tube BID    carvediloL  25 mg Per OG tube BID    furosemide (LASIX) injection  120 mg Intravenous TID    heparin (porcine)  7,500 Units Subcutaneous Q8H    insulin aspart U-100  6 Units Subcutaneous Q4H    insulin detemir U-100  14 Units Subcutaneous Daily    pantoprazole  40 mg Intravenous Daily    piperacillin-tazobactam (Zosyn) IV (PEDS and ADULTS) (extended infusion is not appropriate)  4.5 g Intravenous Q8H    polyethylene glycol  17 g Per NG tube Daily    sevelamer carbonate  1.6 g Per OG tube TID     PRN Meds:sodium chloride 0.9%, acetaminophen, albuterol-ipratropium, dextrose 10 % in water (D10W), dextrose 10%, dextrose 10%, glucagon (human recombinant), glucose, glucose, hydrALAZINE, insulin aspart U-100, labetalol, naloxone, ondansetron, oxyCODONE, prochlorperazine, sodium chloride 0.9%, sodium chloride 0.9%, sodium chloride 0.9%     Review of patient's allergies indicates:  No Known Allergies  Objective:     Vital Signs (Most Recent):  Temp: 99 °F (37.2 °C) (02/22/24 0700)  Pulse: 90 (02/22/24 0800)  Resp: (!) 22 (02/22/24 0800)  BP: (!) 146/69 (02/22/24 0800)  SpO2: 100 % (02/22/24 0800) Vital Signs (24h Range):  Temp:  [98.6 °F (37 °C)-99.9 °F (37.7 °C)] 99 °F (37.2 °C)  Pulse:  [80-92] 90  Resp:  [17-29] 22  SpO2:  [100 %] 100 %  BP: (127-158)/(59-74) 146/69     Weight: (!) 150.9 kg (332 lb 10.8 oz)  Body mass index is 55.36 kg/m².    Intake/Output - Last 3 Shifts         02/20 0700 02/21 0659 02/21 0700 02/22 0659 02/22 0700 02/23 0659    I.V. (mL/kg) 1733.5 (11.5) 48.8 (0.3)     Blood  355     NG/GT 1140 950     IV Piggyback 295.8 295.5     Total Intake(mL/kg) 3169.3 (21) 1649.3 (10.9)     Urine  (mL/kg/hr) 1800 (0.5) 1165 (0.3) 100 (0.4)    Drains       Other 1549 175     Stool 0 0     Total Output 3349 1340 100    Net -179.7 +309.3 -100           Stool Occurrence 1 x 4 x              Physical Exam  Vitals and nursing note reviewed.   Constitutional:       General: She is not in acute distress.     Appearance: She is ill-appearing.   HENT:      Head: Normocephalic and atraumatic.   Eyes:      Extraocular Movements: Extraocular movements intact.      Conjunctiva/sclera: Conjunctivae normal.   Neck:      Comments: Left IJ Trialysis catheter  Cardiovascular:      Rate and Rhythm: Normal rate and regular rhythm.   Pulmonary:      Effort: Pulmonary effort is normal.      Comments: Intubated, on spontaneous    Abdominal:      General: There is no distension.      Palpations: Abdomen is soft.      Tenderness: There is no abdominal tenderness.   Genitourinary:     Comments: Glynn in place    Skin:     General: Skin is warm and dry.      Comments: Wound vac in place to R thigh/groin to suction   Neurological:      General: No focal deficit present.      Mental Status: She is oriented to person, place, and time.   Psychiatric:         Mood and Affect: Mood normal.         Behavior: Behavior normal.          Significant Labs:  I have reviewed all pertinent lab results within the past 24 hours.    Significant Diagnostics:  I have reviewed all pertinent imaging results/findings within the past 24 hours.

## 2024-02-22 NOTE — PROGRESS NOTES
[facility]  Critical Care - Surgery  Progress Note    Patient Name: Sarah Saravia  MRN: [id number]  Admission Date: 1/29/2024  Hospital Length of Stay: 24 days  Code Status: Full Code  Attending Provider: Steve Cole MD  [facility]   Principal Problem: Ayaz's gangrene    Subjective:     Hospital/ICU Course:  No notes on file    Interval History/Significant Events: Pt seen and examined at bedside. BISHNU MULLER.    Follow-up For: Procedure(s) (LRB):  Lumbar Puncture (N/A)    Post-Operative Day: 6 Days Post-Op    Objective:     Vital Signs (Most Recent):  Temp: 99.7 °F (37.6 °C) (02/22/24 0300)  Pulse: 90 (02/22/24 0645)  Resp: (!) 23 (02/22/24 0645)  BP: (!) 153/71 (02/22/24 0645)  SpO2: 100 % (02/22/24 0645) Vital Signs (24h Range):  Temp:  [98.6 °F (37 °C)-99.9 °F (37.7 °C)] 99.7 °F (37.6 °C)  Pulse:  [80-92] 90  Resp:  [17-29] 23  SpO2:  [100 %] 100 %  BP: (127-158)/(59-74) 153/71     Weight: (!) 150.9 kg (332 lb 10.8 oz)  Body mass index is 55.36 kg/m².      Intake/Output Summary (Last 24 hours) at 2/22/2024 0719  Last data filed at 2/22/2024 0600  Gross per 24 hour   Intake 1523.89 ml   Output 1290 ml   Net 233.89 ml          Physical Exam   Vitals and nursing note reviewed.   Constitutional:       General: She is not in acute distress.     Appearance: She is ill-appearing.   HENT:      Head: Normocephalic and atraumatic.   Eyes:      Extraocular Movements: Extraocular movements intact.      Conjunctiva/sclera: Conjunctivae normal.   Neck:      Comments: Left IJ Trialysis catheter  Cardiovascular:      Rate and Rhythm: Normal rate and regular rhythm.   Pulmonary:      Effort: Pulmonary effort is normal.      Comments: Intubated, on spontaneous     Abdominal:      General: There is no distension.      Palpations: Abdomen is soft.      Tenderness: There is no abdominal tenderness.   Genitourinary:     Comments: Glynn in place      Skin:     General: Skin is warm and dry.      Comments: Wound vac in place to R thigh/groin to suction   Neurological:      General: No focal deficit present.      Mental Status: She is oriented to person, place, and time.   Psychiatric:         Mood and Affect: Mood normal.         Behavior: Behavior normal.     Vents:  Vent Mode: Spont (02/21/24 2308)  Set Rate: 18 BPM (02/06/24 0349)  Vt Set: 420 mL (02/06/24 0349)  Pressure Support: 10 cmH20 (02/21/24 2308)  PEEP/CPAP: 8 cmH20 (02/21/24 2308)  Oxygen Concentration (%): 30 (02/22/24 0645)  Peak Airway Pressure: 19 cmH20 (02/21/24 2308)  Plateau Pressure: 15 cmH20 (02/21/24 2308)  Total Ve: 8.87 L/m (02/21/24 2308)  Negative Inspiratory Force (cm H2O): 0 (02/21/24 2308)  F/VT Ratio<105 (RSBI): (!) 64.69 (02/21/24 2308)    Lines/Drains/Airways       Central Venous Catheter Line  Duration             Trialysis (Dialysis) Catheter 02/11/24 2303 left internal jugular 10 days              Drain  Duration                  NG/OG Tube 02/06/24 1030 Center mouth 15 days         Urethral Catheter 02/16/24 1215 5 days              Airway  Duration                  Airway - Non-Surgical 01/31/24 1020 21 days              Peripheral Intravenous Line  Duration                  Peripheral IV - Single Lumen 02/22/24 0015 22 G Left;Posterior Forearm <1 day         Peripheral IV - Single Lumen 02/22/24 0020 20 G Posterior;Right Forearm <1 day                    Significant Labs:    CBC/Anemia Profile:  Recent Labs   Lab 02/21/24  0347 02/21/24  1414 02/22/24  0209   WBC 9.38 9.39 10.37   HGB 6.1* 8.2* 8.3*   HCT 20.5* 26.3* 26.6*    309 318   MCV 93 90 89   RDW 17.7* 17.1* 17.5*        Chemistries:  Recent Labs   Lab 02/21/24  0347 02/21/24  1414 02/22/24  0209     140 136 139   K 4.0  4.0 4.8 5.1   *  114* 105 106   CO2 20*  20* 22* 24   BUN 27*  27* 37* 47*   CREATININE 1.9*  1.9* 3.0* 3.6*   CALCIUM 6.8*  6.8* 8.9 9.0   ALBUMIN 1.6*  1.6* 2.0* 2.0*    PROT 6.2  --  7.7   BILITOT 0.2  --  0.3   ALKPHOS 97  --  126   ALT 11  --  14   AST 29  --  30   MG 1.5* 2.1 2.2   PHOS 2.6* 4.2 4.7*       Significant Imaging:  I have reviewed all pertinent imaging results/findings within the past 24 hours.  Assessment/Plan:     Renal/  * Ayaz's gangrene    Neuro/Psych:   #Acute Encephalopathy  CTH 2/3 with scattered hypoattenuation likely representing age indeterminate infarcts. EEG findings consistent with moderate-severe encephalopathy. MRI 2/6: Several scattered punctate acute infarcts throughout the bilateral frontal lobes, centrum semiovale, basal ganglia, corpus callosum, and cerebellar hemispheres.  CTA 2/6: Atherosclerotic plaquing of the carotid bifurcations and proximal ICAs with less than 50% proximal ICA stenosis by NASCET criteria . Repeat CTH and MRI/MRA unchanged from prior exams. S/p multiple wound vac exchanges. Palliative onboard with family wanting full measures.    Neuro/Psych  Repeat CTH and MRI/MRA stable from initial reads. LP 2/16. Elevated WBCs and protein consistent with bacterial meningitis.  -- Sedation: None  -- Pain: kermit tylenol, dialudid and oxy prn  -- GCS 4T: E2, V1T, M1; exam stable  -- Neurology following; appreciate recs  -- Neurovascular following previously; signed off 2/17  -- Palliative following; GOC conversation 2/7; appreciate recs  -- CSF culture with no growth, awaiting further cultures             Cards:   -- HDS  -- goal SBP < 180, MAP >65  -- normotensive; hydralazine and labetalol prns  -- Continue amlodipine 10mg daily; coreg 25mg BID      Pulm:   #Acute Respiratory Failure  -- Intubated on spontaneous , Pressure support.  -- Goal O2 sat > 90%  -- Trach/PEG today      Renal:  #ALVARADO on CKD  #ATN 2/2 septic shock  2/13 YASMINE trialysis  -- Nephrology following; appreciate recs  -- On sevelamer  -- Replace lytes as needed.  -- Continue lasix 120mg IV tid    Recent Labs   Lab 02/21/24  0347 02/21/24  1414 02/22/24  0209   BUN  27*  27* 37* 47*   CREATININE 1.9*  1.9* 3.0* 3.6*        FEN / GI:   -- Daily CMPs  -- NGT in place   -- Replace lytes as needed  -- Nutrition: NPO, TF (Peptamen Intense) at goal 30 cc/hr.    -- GI PPX   -- Nutrition following; appreciate recs  -- Continue psyllium and sevelamer      ID:    #Ayaz's gangrene  s/p wound vac exchange/ debridement x4 for nec fascitis. R groin tissue with proteus, sensitive to ceftriaxone. Growing bacteroids as well. Urine cx 2/18 growing C. Indologens.  -- Abx: zosyn, EOT 2/22   -- If febrile, transition back to ceftriaxone and flagyl. If concerned with UTI, add on bactrim for 3 day course.  -- ID following ; appreciate recs     Heme/Onc:  #Anemia  -- Hgb 8.3  -- Daily CBC  -- Heparin DVT ppx  -- Transfuse if Hgb <7     Endo:   #IDDM  Initially presented to OSH with DKA with latest A1C 10.4 AG Gap resolved  Placed on insulin gtt 2/3 and dced 2/12.    - q4h BG monitoring while NPO  - Detemir 14 units daily  - Novolog 6units q4h while on TFs  - Moderate dose SSI PRN  - Endocrine following, appreciate recs      PPx:   Feeding: Tube Feeds (held, OR today)  Analgesia/Sedation: See above  Thromboembolic prevention: SQH   HOB >30: Y  Stress Ulcer ppx: Famotidine  Glucose control: Goal 140-180 g/dl, kermit detemir and novolog, SSI, Endo following  Bowel reg: psyllium  Invasive Lines/Drains/Airway: Glynn, ETT, LIJ Trialysis, PIV x2      Dispo/Code Status/Palliative:   -- SICU / Full Code   -- PEG/Trach today         Robinson Rousseau MD  Critical Care - Surgery  [facility] - Surgical Intensive Care

## 2024-02-22 NOTE — PROGRESS NOTES
02/22/24 1645   Treatment   Treatment Type SLED   Treatment Status Restart   Dialysis Machine Number [id number]   Dialyzer Time (hours) 0   BVP (Liters) 0 L   Solutions Labeled and Current  Yes   Access Left;IJ;Temporary Cath   Catheter Dressing Intact  Yes   Alarms Engaged Yes   CRRT Comments Restart 8 hour SLED as ordered.   Prescription   Time (Hours) 8   Dialysate K + (mEq/L) 4   Dialysate CA + (mEq/L) 3   Dialysate HCO3 - (Bicarb) (mEq/L) 30   Dialysate Na + (mEq/L) 140   Cartridge Type Other  (R300)   Dialysate Flow Rate (mL/min) 200   UF Goal Rate 300 mL/hr   CRRT Hourly Documentation   Blood Flow (mL/min) 150   UF Rate 250 cc/hr   Arterial Pressure (mmHg) -70 mmHg   Venous Pressure (mmHg) 90 mmHg   Effluent Pressure (EP) (mmHg) 10 mmHg   Total UF (Hourly Cleared) (mL) 0       8 hour SLED restarted as ordered.    Dialysis Access: left IJ trialysis  Aspirated, flushed, and accessed using aseptic technique.  Lines connected and secured.    Set UF: 250 ml/hr  Goal UF: 250-300 ml/hr    Started without issue.  Refer to CRRT flowsheet for vitals and further details.  Bedside nurse updated on plan of care.

## 2024-02-22 NOTE — LOPA/MORA/SWTA/AOC/AEB
[facility] ([facility])  On-Site Evaluation  [facility] Contact # [phone]        Thank you for the referral of this patient to determine suitability for organ, tissue, and eye donation.  A chart review has been conducted 2/22/24 at 1530.  [facility] findings are as follows:      ? Potential candidate for donation/ Referral Closed- Any changes in patients condition, GCS of 5 or less, discussion of withdrawing the vent, brain death exams, or family mention of donation immediately call [phone].      [facility] Representative:  Emily Abadie BS, MS           [facility] Referral Number:   [id number]

## 2024-02-22 NOTE — PROGRESS NOTES
[facility]  General Surgery  Progress Note    Subjective:     History of Present Illness:  53 year old morbidly obese female who does not routinely go to the doctor presents to the ED with malaise, nausea, vomiting, and groin, buttock, perineal swelling and tenderness. She began feeling ill a few days ago.     On arrival to the ED she is tachycardic to 120, hypertensive without fever. Labs demonstrate leukocytosis of 21, , with lactic acidosis and associated ALVARADO with cr 3.8, hyperglycemia with blood glucose of 824 accompanied by severe electrolyte derangements. CT imaging obtained demonstrates diffuse subcutaneous air along buttocks, perineum, anterior vulva, as well as bilateral thighs.     No prior abdominal surgeries. She denies problems with her heart or lungs. Non smoker. Does not routinely take any medications.    Post-Op Info:  Procedure(s) (LRB):  Lumbar Puncture (N/A)   6 Days Post-Op     Interval History: NAEON. Afebrile. HDS. Vent at 30/8. TF held. To OR today for trach, PEG, partial wound closure  Neuro status unchanged    Medications:  Continuous Infusions:   dextrose 10 % in water (D10W)       Scheduled Meds:   amLODIPine  10 mg Per OG tube Daily    calcium carbonate  500 mg Per NG tube BID    carvediloL  25 mg Per OG tube BID    furosemide (LASIX) injection  120 mg Intravenous TID    heparin (porcine)  7,500 Units Subcutaneous Q8H    insulin aspart U-100  6 Units Subcutaneous Q4H    insulin detemir U-100  14 Units Subcutaneous Daily    pantoprazole  40 mg Intravenous Daily    piperacillin-tazobactam (Zosyn) IV (PEDS and ADULTS) (extended infusion is not appropriate)  4.5 g Intravenous Q8H    polyethylene glycol  17 g Per NG tube Daily    sevelamer carbonate  1.6 g Per OG tube TID     PRN Meds:sodium chloride 0.9%, acetaminophen, albuterol-ipratropium, dextrose 10 % in water (D10W), dextrose 10%, dextrose 10%, glucagon (human recombinant), glucose, glucose, hydrALAZINE,  insulin aspart U-100, labetalol, naloxone, ondansetron, oxyCODONE, prochlorperazine, sodium chloride 0.9%, sodium chloride 0.9%, sodium chloride 0.9%     Review of patient's allergies indicates:  No Known Allergies  Objective:     Vital Signs (Most Recent):  Temp: 99 °F (37.2 °C) (02/22/24 0700)  Pulse: 90 (02/22/24 0800)  Resp: (!) 22 (02/22/24 0800)  BP: (!) 146/69 (02/22/24 0800)  SpO2: 100 % (02/22/24 0800) Vital Signs (24h Range):  Temp:  [98.6 °F (37 °C)-99.9 °F (37.7 °C)] 99 °F (37.2 °C)  Pulse:  [80-92] 90  Resp:  [17-29] 22  SpO2:  [100 %] 100 %  BP: (127-158)/(59-74) 146/69     Weight: (!) 150.9 kg (332 lb 10.8 oz)  Body mass index is 55.36 kg/m².    Intake/Output - Last 3 Shifts         02/20 0700  02/21 0659 02/21 0700 02/22 0659 02/22 0700 02/23 0659    I.V. (mL/kg) 1733.5 (11.5) 48.8 (0.3)     Blood  355     NG/GT 1140 950     IV Piggyback 295.8 295.5     Total Intake(mL/kg) 3169.3 (21) 1649.3 (10.9)     Urine (mL/kg/hr) 1800 (0.5) 1165 (0.3) 100 (0.4)    Drains       Other 1549 175     Stool 0 0     Total Output 3349 1340 100    Net -179.7 +309.3 -100           Stool Occurrence 1 x 4 x             Physical Exam  Vitals and nursing note reviewed.   Constitutional:       General: She is not in acute distress.     Appearance: She is ill-appearing.   HENT:      Head: Normocephalic and atraumatic.   Eyes:      Extraocular Movements: Extraocular movements intact.      Conjunctiva/sclera: Conjunctivae normal.   Neck:      Comments: Left IJ Trialysis catheter  Cardiovascular:      Rate and Rhythm: Normal rate and regular rhythm.   Pulmonary:      Effort: Pulmonary effort is normal.      Comments: Intubated, on spontaneous    Abdominal:      General: There is no distension.      Palpations: Abdomen is soft.      Tenderness: There is no abdominal tenderness.   Genitourinary:     Comments: Glynn in place    Skin:     General: Skin is warm and dry.      Comments: Wound vac in place to R thigh/groin to suction    Neurological:      General: No focal deficit present.      Mental Status: She is oriented to person, place, and time.   Psychiatric:         Mood and Affect: Mood normal.         Behavior: Behavior normal.          Significant Labs:  I have reviewed all pertinent lab results within the past 24 hours.    Significant Diagnostics:  I have reviewed all pertinent imaging results/findings within the past 24 hours.  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 Unfortunately, has multiple infarcts on MRI brain with unchanged neuro status, however, family would like to proceed with all measures    - Last [place] change 2/19  - Plan for trach/PEG and partial wound closure today   - Hold TF at MN   - LP 2/16 - Specimen for cultures is missing.   - ID consulted for urine cultures - recommend broadening to zosyn    - UA growing Burkholderia species and Chryseibacterium Indologenes, recommend IV zosyn with stop date of 2/22  - Palliative following given overall poor prognosis, daughter would like everything done  - Stable on spontaneous   - Okay for DVT ppx   - Remainder of care per SICU    Dispo: will need LTAC placement     Case discussed with Dr. Cole.      Braxton Calhoun PA-C  General Surgery  [facility]

## 2024-02-22 NOTE — SUBJECTIVE & OBJECTIVE
Interval History:     No acute events overnight. Patient scheduled for trach/peg with possible wound closure today. Remains hemodynamically stable on minimal vent settings. UOP stable with IV lasix 120mg TID. Mentation unchanged.     Review of patient's allergies indicates:  No Known Allergies  Current Facility-Administered Medications   Medication Frequency    0.9%  NaCl infusion (CRRT USE ONLY) Continuous    0.9%  NaCl infusion PRN    acetaminophen tablet 650 mg Q4H PRN    albuterol-ipratropium 2.5 mg-0.5 mg/3 mL nebulizer solution 3 mL Q4H PRN    amLODIPine tablet 10 mg Daily    calcium carbonate 200 mg calcium (500 mg) chewable tablet 500 mg BID    carvediloL tablet 25 mg BID    dextrose 10 % infusion Continuous PRN    dextrose 10% bolus 125 mL 125 mL PRN    dextrose 10% bolus 250 mL 250 mL PRN    furosemide injection 120 mg TID    glucagon (human recombinant) injection 1 mg PRN    glucose chewable tablet 16 g PRN    glucose chewable tablet 24 g PRN    heparin (porcine) injection 7,500 Units Q8H    hydrALAZINE injection 10 mg Q4H PRN    insulin aspart U-100 pen 0-10 Units Q4H PRN    insulin aspart U-100 pen 6 Units Q4H    insulin detemir U-100 (Levemir) pen 14 Units Daily    labetalol 20 mg/4 mL (5 mg/mL) IV syring Q6H PRN    magnesium sulfate 2g in water 50mL IVPB (premix) PRN    naloxone 0.4 mg/mL injection 0.02 mg PRN    ondansetron injection 4 mg Q6H PRN    oxyCODONE 5 mg/5 mL solution 5 mg Q6H PRN    pantoprazole injection 40 mg Daily    piperacillin-tazobactam (ZOSYN) 4.5 g in dextrose 5 % in water (D5W) 100 mL IVPB (MB+) Q8H    polyethylene glycol packet 17 g Daily    prochlorperazine injection Soln 5 mg Q6H PRN    sevelamer carbonate pwpk 1.6 g TID    sodium chloride 0.9% bolus 250 mL 250 mL PRN    sodium chloride 0.9% bolus 250 mL 250 mL PRN    sodium chloride 0.9% flush 10 mL PRN    sodium phosphate 20.01 mmol in dextrose 5 % (D5W) 250 mL IVPB PRN    sodium phosphate 30 mmol in dextrose 5 % (D5W) 250  mL IVPB PRN    sodium phosphate 39.99 mmol in dextrose 5 % (D5W) 250 mL IVPB PRN       Objective:     Vital Signs (Most Recent):  Temp: 99 °F (37.2 °C) (02/22/24 0700)  Pulse: 90 (02/22/24 0800)  Resp: (!) 22 (02/22/24 0800)  BP: (!) 146/69 (02/22/24 0800)  SpO2: 100 % (02/22/24 0800) Vital Signs (24h Range):  Temp:  [98.6 °F (37 °C)-99.9 °F (37.7 °C)] 99 °F (37.2 °C)  Pulse:  [80-92] 90  Resp:  [17-29] 22  SpO2:  [100 %] 100 %  BP: (127-158)/(59-74) 146/69     Weight: (!) 150.9 kg (332 lb 10.8 oz) (02/21/24 0300)  Body mass index is 55.36 kg/m².  Body surface area is 2.63 meters squared.    I/O last 3 completed shifts:  In: 4173 [I.V.:1712.3; Blood:355; NG/GT:1650; IV Piggyback:455.7]  Out: 3849 [Urine:2175; Other:1674]     Physical Exam  Vitals and nursing note reviewed.   Constitutional:       General: She is not in acute distress.     Appearance: She is ill-appearing.   HENT:      Head: Normocephalic and atraumatic.   Eyes:      Extraocular Movements: Extraocular movements intact.      Conjunctiva/sclera: Conjunctivae normal.   Neck:      Comments: Left IJ Trialysis catheter  Cardiovascular:      Rate and Rhythm: Normal rate and regular rhythm.   Pulmonary:      Effort: Pulmonary effort is normal.      Comments: Intubated, on spontaneous    Abdominal:      General: There is no distension.      Palpations: Abdomen is soft.      Tenderness: There is no abdominal tenderness.   Genitourinary:     Comments: Glynn in place    Skin:     General: Skin is warm and dry.      Comments: Wound vac in place to R thigh/groin to suction   Neurological:      General: No focal deficit present.      Mental Status: She is oriented to person, place, and time.   Psychiatric:         Mood and Affect: Mood normal.         Behavior: Behavior normal.          Significant Labs:  All labs within the past 24 hours have been reviewed.     Significant Imaging:  Labs: Reviewed

## 2024-02-22 NOTE — SUBJECTIVE & OBJECTIVE
"Interval HPI:   No acute events overnight. Patient in room 83594/05588 A. Blood glucose stable. BG at goal on current insulin regimen (Schedule Insulin and SSI (TPN/TF)). Steroid use- None .   6 Days Post-Op  Renal function- Abnormal - Cr 3.6   Vasopressors-  None     Diet NPO     Eating:   NPO  Nausea: No  Hypoglycemia and intervention: No  Fever: No  TPN and/or TF: Yes  If yes, type of TF/TPN and rate: on hold for sx.    BP (!) 146/69 (BP Location: Left arm, Patient Position: Lying)   Pulse 90   Temp 99 °F (37.2 °C) (Oral)   Resp (!) 22   Ht 5' 5" (1.651 m)   Wt (!) 150.9 kg (332 lb 10.8 oz)   LMP 01/01/2024 (Approximate) Comment: tubal ligation  SpO2 100%   BMI 55.36 kg/m²     Labs Reviewed and Include    Recent Labs   Lab 02/22/24  0209   *   CALCIUM 9.0   ALBUMIN 2.0*   PROT 7.7      K 5.1   CO2 24      BUN 47*   CREATININE 3.6*   ALKPHOS 126   ALT 14   AST 30   BILITOT 0.3     Lab Results   Component Value Date    WBC 10.37 02/22/2024    HGB 8.3 (L) 02/22/2024    HCT 26.6 (L) 02/22/2024    MCV 89 02/22/2024     02/22/2024     No results for input(s): "TSH", "FREET4" in the last 168 hours.  Lab Results   Component Value Date    HGBA1C 10.4 (H) 01/30/2024       Nutritional status:   Body mass index is 55.36 kg/m².  Lab Results   Component Value Date    ALBUMIN 2.0 (L) 02/22/2024    ALBUMIN 2.0 (L) 02/21/2024    ALBUMIN 1.6 (L) 02/21/2024    ALBUMIN 1.6 (L) 02/21/2024     No results found for: "PREALBUMIN"    Estimated Creatinine Clearance: 27 mL/min (A) (based on SCr of 3.6 mg/dL (H)).    Accu-Checks  Recent Labs     02/20/24  1925 02/20/24  2317 02/21/24  0345 02/21/24  0802 02/21/24  1154 02/21/24  1547 02/21/24  1905 02/21/24  2305 02/22/24  0209 02/22/24  0808   POCTGLUCOSE 141* 144* 126* 137* 168* 166* 166* 158* 138* 139*       Current Medications and/or Treatments Impacting Glycemic Control  Immunotherapy:    Immunosuppressants       None          Steroids:   Hormones " (From admission, onward)      None          Pressors:    Autonomic Drugs (From admission, onward)      None          Hyperglycemia/Diabetes Medications:   Antihyperglycemics (From admission, onward)      Start     Stop Route Frequency Ordered    02/13/24 0915  insulin detemir U-100 (Levemir) pen 14 Units         -- SubQ Daily 02/13/24 0906    02/09/24 1200  insulin aspart U-100 pen 6 Units         -- SubQ Every 4 hours 02/09/24 0901    02/03/24 1010  insulin aspart U-100 pen 0-10 Units         -- SubQ Every 4 hours PRN 02/03/24 0911

## 2024-02-22 NOTE — PROGRESS NOTES
[facility]  Nephrology  Progress Note    Patient Name: Sarah Saravia  MRN: [id number]  Admission Date: 1/29/2024  Hospital Length of Stay: 24 days  Attending Provider: Steve Cole MD   Primary Care Physician: [facility]  Principal Problem:Ayaz's gangrene    Subjective:     HPI: Sarah Saravia is a 53 year old female with a past medical history of obesity (BMI 53) admitted with N/V and R groin wound found to be in DKA at OSH.  She underwent a CT abdomen and pelvis that showed extensive soft tissue air throughout the right groin and inguinal region and extending to involve the right lower quadrant anterior abdominal wall with extensive soft tissue air also seen involving the proximal medial aspect of the right thigh, concerning for gas-forming infection. She is s/p OR debridement for necrotizing fascitis on 1/29/24 and take back for 1/31/24. Right groin tissue growing proteus, susceptibilities still pending. Currently on meropenem and clindamycin which was d/c'd on 1/31/24. While at OSH pt developed acute respiratory failure requiring intubation. Nephrology was consulted for worsening alvarado in the setting of lactic acidosis and septic shock likely ATN, sCr elevated at 3.8 on admit.  OR today for debridement with possible closure and wound vac placement. Last HD 2/1. Net -1.3L. Electrolytes stable. Nephrology consulted for ALVARADO.     Interval History:     No acute events overnight. Patient scheduled for trach/peg with possible wound closure today. Remains hemodynamically stable on minimal vent settings. UOP stable with IV lasix 120mg TID. Mentation unchanged.     Review of patient's allergies indicates:  No Known Allergies  Current Facility-Administered Medications   Medication Frequency    0.9%  NaCl infusion (CRRT USE ONLY) Continuous    0.9%  NaCl infusion PRN    acetaminophen tablet 650 mg Q4H PRN    albuterol-ipratropium 2.5 mg-0.5 mg/3 mL nebulizer solution  3 mL Q4H PRN    amLODIPine tablet 10 mg Daily    calcium carbonate 200 mg calcium (500 mg) chewable tablet 500 mg BID    carvediloL tablet 25 mg BID    dextrose 10 % infusion Continuous PRN    dextrose 10% bolus 125 mL 125 mL PRN    dextrose 10% bolus 250 mL 250 mL PRN    furosemide injection 120 mg TID    glucagon (human recombinant) injection 1 mg PRN    glucose chewable tablet 16 g PRN    glucose chewable tablet 24 g PRN    heparin (porcine) injection 7,500 Units Q8H    hydrALAZINE injection 10 mg Q4H PRN    insulin aspart U-100 pen 0-10 Units Q4H PRN    insulin aspart U-100 pen 6 Units Q4H    insulin detemir U-100 (Levemir) pen 14 Units Daily    labetalol 20 mg/4 mL (5 mg/mL) IV syring Q6H PRN    magnesium sulfate 2g in water 50mL IVPB (premix) PRN    naloxone 0.4 mg/mL injection 0.02 mg PRN    ondansetron injection 4 mg Q6H PRN    oxyCODONE 5 mg/5 mL solution 5 mg Q6H PRN    pantoprazole injection 40 mg Daily    piperacillin-tazobactam (ZOSYN) 4.5 g in dextrose 5 % in water (D5W) 100 mL IVPB (MB+) Q8H    polyethylene glycol packet 17 g Daily    prochlorperazine injection Soln 5 mg Q6H PRN    sevelamer carbonate pwpk 1.6 g TID    sodium chloride 0.9% bolus 250 mL 250 mL PRN    sodium chloride 0.9% bolus 250 mL 250 mL PRN    sodium chloride 0.9% flush 10 mL PRN    sodium phosphate 20.01 mmol in dextrose 5 % (D5W) 250 mL IVPB PRN    sodium phosphate 30 mmol in dextrose 5 % (D5W) 250 mL IVPB PRN    sodium phosphate 39.99 mmol in dextrose 5 % (D5W) 250 mL IVPB PRN       Objective:     Vital Signs (Most Recent):  Temp: 99 °F (37.2 °C) (02/22/24 0700)  Pulse: 90 (02/22/24 0800)  Resp: (!) 22 (02/22/24 0800)  BP: (!) 146/69 (02/22/24 0800)  SpO2: 100 % (02/22/24 0800) Vital Signs (24h Range):  Temp:  [98.6 °F (37 °C)-99.9 °F (37.7 °C)] 99 °F (37.2 °C)  Pulse:  [80-92] 90  Resp:  [17-29] 22  SpO2:  [100 %] 100 %  BP: (127-158)/(59-74) 146/69     Weight: (!) 150.9 kg (332 lb 10.8 oz) (02/21/24 0300)  Body mass index is  55.36 kg/m².  Body surface area is 2.63 meters squared.    I/O last 3 completed shifts:  In: 4173 [I.V.:1712.3; Blood:355; NG/GT:1650; IV Piggyback:455.7]  Out: 3849 [Urine:2175; Other:1674]     Physical Exam  Vitals and nursing note reviewed.   Constitutional:       General: She is not in acute distress.     Appearance: She is ill-appearing.   HENT:      Head: Normocephalic and atraumatic.   Eyes:      Extraocular Movements: Extraocular movements intact.      Conjunctiva/sclera: Conjunctivae normal.   Neck:      Comments: Left IJ Trialysis catheter  Cardiovascular:      Rate and Rhythm: Normal rate and regular rhythm.   Pulmonary:      Effort: Pulmonary effort is normal.      Comments: Intubated, on spontaneous    Abdominal:      General: There is no distension.      Palpations: Abdomen is soft.      Tenderness: There is no abdominal tenderness.   Genitourinary:     Comments: Glynn in place    Skin:     General: Skin is warm and dry.      Comments: Wound vac in place to R thigh/groin to suction   Neurological:      General: No focal deficit present.      Mental Status: She is oriented to person, place, and time.   Psychiatric:         Mood and Affect: Mood normal.         Behavior: Behavior normal.          Significant Labs:  All labs within the past 24 hours have been reviewed.     Significant Imaging:  Labs: Reviewed  Assessment/Plan:     Neuro  Cerebral infarction  Per primary team.     Renal/  * Ayaz's gangrene  - management per primary     ALVARADO (acute kidney injury)  Transfer from [facility]. Admitted for fourniers gangrene. Initiated HD on 1/31. ALVARADO 2/2 ATN in setting of septic shock. Arrived with CR 3.8. Unknown baseline. S/P OR debridement with possible closure and wound vac placement     ALVARADO most likley ATN in setting of septic shock     Plan/Recommendation  - Plan for 8hrs sled session today.   - Please consult IR for perm cath placement if clear from infection stand point.   - Please dose all ABX  in accordance to RRT schedule (zosyn, etc)  - Continue IV lasix 120mg TID.   -Daily RFP   -Strict I&Os  -Avoid nephrotoxins, if possible         Thank you for your consult. I will follow-up with patient. Please contact us if you have any additional questions.    Case discussed with attending. Attestation to follow.       Antione Hazle DO  Nephrology  [facility]

## 2024-02-22 NOTE — BRIEF OP NOTE
[facility]  Brief Operative Note    SUMMARY     Surgery Date: 2/22/2024     Surgeon(s) and Role:     * Steve Cole MD - Primary     * Enzo Bagley MD - Resident - Assisting     * Chicho Dale MD - Resident - Assisting    Pre-op Diagnosis:  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]  On mechanically assisted ventilation [Z99.11]  Cerebral infarction, unspecified mechanism [I63.9]  Acute hypoxemic respiratory failure [J96.01]    Post-op Diagnosis:  Post-Op Diagnosis Codes:     * 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]     * On mechanically assisted ventilation [Z99.11]     * Cerebral infarction, unspecified mechanism [I63.9]     * Acute hypoxemic respiratory failure [J96.01]    Procedure(s) (LRB):  CREATION, TRACHEOSTOMY (N/A)  EGD, WITH PEG TUBE INSERTION (N/A)  CLOSURE, WOUND (Right)  REPLACEMENT, WOUND VAC (Right)    Anesthesia: General    Implants:  * No implants in log *    Operative Findings:   1) Tracheostomy creation  2) PEG placement, bumper at 6.5cm  3) Closer of inferior leg/groin wound with pinrose drain placement  4) Wound vac placement of anterior groin wound    Estimated Blood Loss: Minimal    Estimated Blood Loss has been documented.         Specimens:   Specimen (24h ago, onward)      None            [id number]

## 2024-02-22 NOTE — PLAN OF CARE
Recommendations     1.) TF recs: continue with Peptamen VHP @50ml/hr to provide 1200 kcal, 110g PRO, and 1008ml FF- FWF per MD.                 - if pt still undergoing CRRT, may increase Peptamen VHP to 60ml/hr to provide 1440 kcal, 132g PRO, and 1210ml FF- FWF per MD.      2.)  Monitor for s/s of intolerance such as residuals >500ml, n/v/d, or abdominal distension.      3.)  RD to monitor tolerance, skin, labs, wt.      Goals: Meet % EEN/EPN by follow-up date.  Nutrition Goal Status: progressing towards goal  Communication of RD Recs:  (POC)

## 2024-02-23 PROBLEM — Z93.1 S/P PERCUTANEOUS ENDOSCOPIC GASTROSTOMY (PEG) TUBE PLACEMENT: Status: ACTIVE | Noted: 2024-02-23

## 2024-02-23 PROBLEM — Z93.0 STATUS POST TRACHEOSTOMY: Status: ACTIVE | Noted: 2024-02-23

## 2024-02-23 LAB
ALBUMIN SERPL BCP-MCNC: 2.1 G/DL (ref 3.5–5.2)
ALBUMIN SERPL BCP-MCNC: 2.1 G/DL (ref 3.5–5.2)
ALP SERPL-CCNC: 116 U/L (ref 55–135)
ALT SERPL W/O P-5'-P-CCNC: 15 U/L (ref 10–44)
ANION GAP SERPL CALC-SCNC: 8 MMOL/L (ref 8–16)
ANION GAP SERPL CALC-SCNC: 9 MMOL/L (ref 8–16)
AST SERPL-CCNC: 36 U/L (ref 10–40)
BASOPHILS # BLD AUTO: 0.07 K/UL (ref 0–0.2)
BASOPHILS NFR BLD: 0.6 % (ref 0–1.9)
BILIRUB SERPL-MCNC: 0.4 MG/DL (ref 0.1–1)
BUN SERPL-MCNC: 22 MG/DL (ref 6–20)
BUN SERPL-MCNC: 37 MG/DL (ref 6–20)
CALCIUM SERPL-MCNC: 9.2 MG/DL (ref 8.7–10.5)
CALCIUM SERPL-MCNC: 9.5 MG/DL (ref 8.7–10.5)
CHLORIDE SERPL-SCNC: 106 MMOL/L (ref 95–110)
CHLORIDE SERPL-SCNC: 108 MMOL/L (ref 95–110)
CO2 SERPL-SCNC: 20 MMOL/L (ref 23–29)
CO2 SERPL-SCNC: 22 MMOL/L (ref 23–29)
CREAT SERPL-MCNC: 2.1 MG/DL (ref 0.5–1.4)
CREAT SERPL-MCNC: 3.4 MG/DL (ref 0.5–1.4)
DIFFERENTIAL METHOD BLD: ABNORMAL
EOSINOPHIL # BLD AUTO: 0.6 K/UL (ref 0–0.5)
EOSINOPHIL NFR BLD: 4.6 % (ref 0–8)
ERYTHROCYTE [DISTWIDTH] IN BLOOD BY AUTOMATED COUNT: 17.9 % (ref 11.5–14.5)
EST. GFR  (NO RACE VARIABLE): 15.5 ML/MIN/1.73 M^2
EST. GFR  (NO RACE VARIABLE): 27.7 ML/MIN/1.73 M^2
GLUCOSE SERPL-MCNC: 142 MG/DL (ref 70–110)
GLUCOSE SERPL-MCNC: 173 MG/DL (ref 70–110)
HCT VFR BLD AUTO: 26.5 % (ref 37–48.5)
HGB BLD-MCNC: 8.4 G/DL (ref 12–16)
IMM GRANULOCYTES # BLD AUTO: 0.07 K/UL (ref 0–0.04)
IMM GRANULOCYTES NFR BLD AUTO: 0.6 % (ref 0–0.5)
LYMPHOCYTES # BLD AUTO: 1.5 K/UL (ref 1–4.8)
LYMPHOCYTES NFR BLD: 11.6 % (ref 18–48)
MAGNESIUM SERPL-MCNC: 2.3 MG/DL (ref 1.6–2.6)
MAGNESIUM SERPL-MCNC: 2.4 MG/DL (ref 1.6–2.6)
MCH RBC QN AUTO: 28.4 PG (ref 27–31)
MCHC RBC AUTO-ENTMCNC: 31.7 G/DL (ref 32–36)
MCV RBC AUTO: 90 FL (ref 82–98)
MONOCYTES # BLD AUTO: 1.3 K/UL (ref 0.3–1)
MONOCYTES NFR BLD: 10.7 % (ref 4–15)
NEUTROPHILS # BLD AUTO: 9.1 K/UL (ref 1.8–7.7)
NEUTROPHILS NFR BLD: 71.9 % (ref 38–73)
NRBC BLD-RTO: 0 /100 WBC
PHOSPHATE SERPL-MCNC: 2.7 MG/DL (ref 2.7–4.5)
PHOSPHATE SERPL-MCNC: 3.1 MG/DL (ref 2.7–4.5)
PLATELET # BLD AUTO: 322 K/UL (ref 150–450)
PMV BLD AUTO: 9.8 FL (ref 9.2–12.9)
POCT GLUCOSE: 142 MG/DL (ref 70–110)
POCT GLUCOSE: 144 MG/DL (ref 70–110)
POCT GLUCOSE: 146 MG/DL (ref 70–110)
POCT GLUCOSE: 166 MG/DL (ref 70–110)
POCT GLUCOSE: 180 MG/DL (ref 70–110)
POTASSIUM SERPL-SCNC: 4.8 MMOL/L (ref 3.5–5.1)
POTASSIUM SERPL-SCNC: 4.8 MMOL/L (ref 3.5–5.1)
PROT SERPL-MCNC: 7.6 G/DL (ref 6–8.4)
RBC # BLD AUTO: 2.96 M/UL (ref 4–5.4)
SODIUM SERPL-SCNC: 136 MMOL/L (ref 136–145)
SODIUM SERPL-SCNC: 137 MMOL/L (ref 136–145)
WBC # BLD AUTO: 12.58 K/UL (ref 3.9–12.7)

## 2024-02-23 PROCEDURE — 63600175 PHARM REV CODE 636 W HCPCS

## 2024-02-23 PROCEDURE — 94761 N-INVAS EAR/PLS OXIMETRY MLT: CPT

## 2024-02-23 PROCEDURE — 99900026 HC AIRWAY MAINTENANCE (STAT)

## 2024-02-23 PROCEDURE — 83735 ASSAY OF MAGNESIUM: CPT | Performed by: STUDENT IN AN ORGANIZED HEALTH CARE EDUCATION/TRAINING PROGRAM

## 2024-02-23 PROCEDURE — 99900035 HC TECH TIME PER 15 MIN (STAT)

## 2024-02-23 PROCEDURE — 85025 COMPLETE CBC W/AUTO DIFF WBC: CPT

## 2024-02-23 PROCEDURE — 27000221 HC OXYGEN, UP TO 24 HOURS

## 2024-02-23 PROCEDURE — 27000207 HC ISOLATION

## 2024-02-23 PROCEDURE — 80053 COMPREHEN METABOLIC PANEL: CPT

## 2024-02-23 PROCEDURE — 51798 US URINE CAPACITY MEASURE: CPT

## 2024-02-23 PROCEDURE — 84100 ASSAY OF PHOSPHORUS: CPT | Performed by: STUDENT IN AN ORGANIZED HEALTH CARE EDUCATION/TRAINING PROGRAM

## 2024-02-23 PROCEDURE — 20000000 HC ICU ROOM

## 2024-02-23 PROCEDURE — 63600175 PHARM REV CODE 636 W HCPCS: Performed by: HOSPITALIST

## 2024-02-23 PROCEDURE — 27000923 HC TRIALYSIS CATHETER, ANY SIZE

## 2024-02-23 PROCEDURE — 27201109 HC SYSTEM FECAL MANAGEMENT

## 2024-02-23 PROCEDURE — 99232 SBSQ HOSP IP/OBS MODERATE 35: CPT | Mod: ,,, | Performed by: PHYSICIAN ASSISTANT

## 2024-02-23 PROCEDURE — 99291 CRITICAL CARE FIRST HOUR: CPT | Mod: 24,25,, | Performed by: SURGERY

## 2024-02-23 PROCEDURE — 99232 SBSQ HOSP IP/OBS MODERATE 35: CPT | Mod: ,,, | Performed by: INTERNAL MEDICINE

## 2024-02-23 PROCEDURE — 80069 RENAL FUNCTION PANEL: CPT | Performed by: STUDENT IN AN ORGANIZED HEALTH CARE EDUCATION/TRAINING PROGRAM

## 2024-02-23 PROCEDURE — 83735 ASSAY OF MAGNESIUM: CPT | Mod: 91 | Performed by: STUDENT IN AN ORGANIZED HEALTH CARE EDUCATION/TRAINING PROGRAM

## 2024-02-23 PROCEDURE — 25000003 PHARM REV CODE 250: Performed by: STUDENT IN AN ORGANIZED HEALTH CARE EDUCATION/TRAINING PROGRAM

## 2024-02-23 PROCEDURE — 25000242 PHARM REV CODE 250 ALT 637 W/ HCPCS: Performed by: STUDENT IN AN ORGANIZED HEALTH CARE EDUCATION/TRAINING PROGRAM

## 2024-02-23 RX ORDER — POLYETHYLENE GLYCOL 3350 17 G/17G
17 POWDER, FOR SOLUTION ORAL EVERY OTHER DAY
Status: DISCONTINUED | OUTPATIENT
Start: 2024-02-24 | End: 2024-02-23

## 2024-02-23 RX ADMIN — FUROSEMIDE 120 MG: 10 INJECTION, SOLUTION INTRAVENOUS at 03:02

## 2024-02-23 RX ADMIN — CARVEDILOL 25 MG: 25 TABLET, FILM COATED ORAL at 08:02

## 2024-02-23 RX ADMIN — INSULIN ASPART 6 UNITS: 100 INJECTION, SOLUTION INTRAVENOUS; SUBCUTANEOUS at 03:02

## 2024-02-23 RX ADMIN — INSULIN ASPART 2 UNITS: 100 INJECTION, SOLUTION INTRAVENOUS; SUBCUTANEOUS at 03:02

## 2024-02-23 RX ADMIN — AMLODIPINE BESYLATE 10 MG: 10 TABLET ORAL at 08:02

## 2024-02-23 RX ADMIN — HEPARIN SODIUM 7500 UNITS: 5000 INJECTION INTRAVENOUS; SUBCUTANEOUS at 10:02

## 2024-02-23 RX ADMIN — FUROSEMIDE 120 MG: 10 INJECTION, SOLUTION INTRAVENOUS at 10:02

## 2024-02-23 RX ADMIN — PSYLLIUM HUSK 1 PACKET: 3.4 POWDER ORAL at 08:02

## 2024-02-23 RX ADMIN — HEPARIN SODIUM 7500 UNITS: 5000 INJECTION INTRAVENOUS; SUBCUTANEOUS at 05:02

## 2024-02-23 RX ADMIN — SEVELAMER CARBONATE 1.6 G: 800 POWDER, FOR SUSPENSION ORAL at 03:02

## 2024-02-23 RX ADMIN — CALCIUM CARBONATE (ANTACID) CHEW TAB 500 MG 500 MG: 500 CHEW TAB at 08:02

## 2024-02-23 RX ADMIN — HEPARIN SODIUM 7500 UNITS: 5000 INJECTION INTRAVENOUS; SUBCUTANEOUS at 01:02

## 2024-02-23 RX ADMIN — INSULIN ASPART 6 UNITS: 100 INJECTION, SOLUTION INTRAVENOUS; SUBCUTANEOUS at 09:02

## 2024-02-23 RX ADMIN — SEVELAMER CARBONATE 1.6 G: 800 POWDER, FOR SUSPENSION ORAL at 09:02

## 2024-02-23 RX ADMIN — FUROSEMIDE 120 MG: 10 INJECTION, SOLUTION INTRAVENOUS at 08:02

## 2024-02-23 RX ADMIN — SEVELAMER CARBONATE 1.6 G: 800 POWDER, FOR SUSPENSION ORAL at 10:02

## 2024-02-23 RX ADMIN — INSULIN ASPART 6 UNITS: 100 INJECTION, SOLUTION INTRAVENOUS; SUBCUTANEOUS at 11:02

## 2024-02-23 RX ADMIN — INSULIN DETEMIR 14 UNITS: 100 INJECTION, SOLUTION SUBCUTANEOUS at 08:02

## 2024-02-23 RX ADMIN — INSULIN ASPART 6 UNITS: 100 INJECTION, SOLUTION INTRAVENOUS; SUBCUTANEOUS at 07:02

## 2024-02-23 RX ADMIN — CARVEDILOL 25 MG: 25 TABLET, FILM COATED ORAL at 10:02

## 2024-02-23 RX ADMIN — OXYCODONE HYDROCHLORIDE 5 MG: 5 SOLUTION ORAL at 08:02

## 2024-02-23 NOTE — SUBJECTIVE & OBJECTIVE
"Interval HPI:   nterval HPI:   No acute events overnight. Patient in room 51761/61814 A. Blood glucose stable. BG at goal on current insulin regimen (Schedule Insulin and SSI (TPN/TF)). Steroid use- None .     Renal function- Abnormal - Cr 2.1   Vasopressors-  None      Diet NPO      Eating:   NPO  Nausea: No  Hypoglycemia and intervention: No  Fever: No  TPN and/or TF: Yes TF  If yes, type of TF/TPN and rate: 50 cc.hr    BP (!) 157/72   Pulse 95   Temp 99.6 °F (37.6 °C) (Oral)   Resp (!) 28   Ht 5' 5" (1.651 m)   Wt (!) 150.9 kg (332 lb 10.8 oz)   LMP 01/01/2024 (Approximate) Comment: tubal ligation  SpO2 100%   BMI 55.36 kg/m²     Labs Reviewed and Include    Recent Labs   Lab 02/23/24  0304   *   CALCIUM 9.5   ALBUMIN 2.1*   PROT 7.6      K 4.8   CO2 20*      BUN 22*   CREATININE 2.1*   ALKPHOS 116   ALT 15   AST 36   BILITOT 0.4     Lab Results   Component Value Date    WBC 12.58 02/23/2024    HGB 8.4 (L) 02/23/2024    HCT 26.5 (L) 02/23/2024    MCV 90 02/23/2024     02/23/2024     No results for input(s): "TSH", "FREET4" in the last 168 hours.  Lab Results   Component Value Date    HGBA1C 10.4 (H) 01/30/2024       Nutritional status:   Body mass index is 55.36 kg/m².  Lab Results   Component Value Date    ALBUMIN 2.1 (L) 02/23/2024    ALBUMIN 2.1 (L) 02/22/2024    ALBUMIN 2.0 (L) 02/22/2024     No results found for: "PREALBUMIN"    Estimated Creatinine Clearance: 46.3 mL/min (A) (based on SCr of 2.1 mg/dL (H)).    Accu-Checks  Recent Labs     02/21/24  1905 02/21/24  2305 02/22/24  0209 02/22/24  0808 02/22/24  1125 02/22/24  1523 02/22/24  1921 02/22/24  2309 02/23/24  0304 02/23/24  0733   POCTGLUCOSE 166* 158* 138* 139* 161* 160* 143* 127* 146* 142*       Current Medications and/or Treatments Impacting Glycemic Control  Immunotherapy:    Immunosuppressants       None          Steroids:   Hormones (From admission, onward)      None          Pressors:    Autonomic Drugs (From " admission, onward)      None          Hyperglycemia/Diabetes Medications:   Antihyperglycemics (From admission, onward)      Start     Stop Route Frequency Ordered    02/13/24 0915  insulin detemir U-100 (Levemir) pen 14 Units         -- SubQ Daily 02/13/24 0906    02/09/24 1200  insulin aspart U-100 pen 6 Units         -- SubQ Every 4 hours 02/09/24 0901    02/03/24 1010  insulin aspart U-100 pen 0-10 Units         -- SubQ Every 4 hours PRN 02/03/24 0911

## 2024-02-23 NOTE — PLAN OF CARE
CHRISTINA received a forwarded voicemail for CM team to give family member a call back. SW reached out to daughter Aleks Duran. Daughter just had questions regarding discharge. SW explained patient is not yet medically ready for discharge at this moment but once patient is medically ready and able to tolerate the dialysis, the team will move forward with discharge planning discussion. Daughter understood and thanked for the call back.     ANDREY Banerjee  [facility]  [phone]

## 2024-02-23 NOTE — PLAN OF CARE
SICU PLAN OF CARE    Dx: Ayaz's gangrene    Goals of Care: SBP <170, MAP >65    Vital Signs (last 12 hours):   Temp:  [98.3 °F (36.8 °C)-99.1 °F (37.3 °C)]   Pulse:  [81-94]   Resp:  [3-28]   BP: (124-182)/(62-86)   SpO2:  [99 %-100 %]      Neuro: Withdraws to noxious stimuli  and Unresponsive    Cardiac: NSR    Respiratory: Ventilator; Mode: Spontaneous, 30% FiO2, 8 PEEP    Urine Output: Urethral Catheter  560 mL/shift    Dialysis: SLED, UF Rate: 300/hr    Drains: Wound Vac, total output 100mL/shift    Diet: NPO  and Tube Feeds at 50mL/hour     Labs/Accuchecks: Accuchecks Q4    Skin:  Wound and incisions per documentation. Turn Q2. Heel boots on. Specialty bed in use.    Shift Events:  NAEON. SLED x8 hrs, clot x1. VSS. UOP minimal but adequate. Wound vac output minimal. No signs on acute bleeding. Tolerating TF @ goal. Neuro status unchanged. Trach and PEG sites C/D/I.

## 2024-02-23 NOTE — ASSESSMENT & PLAN NOTE
Transfer from [facility]. Admitted for fourniers gangrene. Initiated HD on 1/31. ALVARADO 2/2 ATN in setting of septic shock. Arrived with CR 3.8. Unknown baseline. S/P OR debridement with possible closure and wound vac placement     ALVARADO most likley ATN in setting of septic shock     Plan/Recommendation  - No urgent indication for RRT at this time. Will continue to monitor need on daily basis.   - Please consult IR for perm cath placement if clear from infection stand point.   - Please dose all ABX in accordance to RRT schedule (zosyn, etc)  - Continue IV lasix 120mg TID.   -Daily RFP   -Strict I&Os  -Avoid nephrotoxins, if possible

## 2024-02-23 NOTE — SUBJECTIVE & OBJECTIVE
Interval History:     No acute events overnight. S/p trach/peg. Hemodynamically stable. Tolerated RRT well overnight.     Review of patient's allergies indicates:  No Known Allergies  Current Facility-Administered Medications   Medication Frequency    0.9%  NaCl infusion (CRRT USE ONLY) Continuous    0.9%  NaCl infusion PRN    acetaminophen tablet 650 mg Q4H PRN    albuterol-ipratropium 2.5 mg-0.5 mg/3 mL nebulizer solution 3 mL Q4H PRN    amLODIPine tablet 10 mg Daily    calcium carbonate 200 mg calcium (500 mg) chewable tablet 500 mg BID    carvediloL tablet 25 mg BID    dextrose 10 % infusion Continuous PRN    dextrose 10% bolus 125 mL 125 mL PRN    dextrose 10% bolus 250 mL 250 mL PRN    furosemide injection 120 mg TID    glucagon (human recombinant) injection 1 mg PRN    glucose chewable tablet 16 g PRN    glucose chewable tablet 24 g PRN    heparin (porcine) injection 7,500 Units Q8H    hydrALAZINE injection 10 mg Q4H PRN    insulin aspart U-100 pen 0-10 Units Q4H PRN    insulin aspart U-100 pen 6 Units Q4H    insulin detemir U-100 (Levemir) pen 14 Units Daily    labetalol 20 mg/4 mL (5 mg/mL) IV syring Q6H PRN    magnesium sulfate 2g in water 50mL IVPB (premix) PRN    naloxone 0.4 mg/mL injection 0.02 mg PRN    ondansetron injection 4 mg Q6H PRN    oxyCODONE 5 mg/5 mL solution 5 mg Q6H PRN    [START ON 2/24/2024] polyethylene glycol packet 17 g Every other day    prochlorperazine injection Soln 5 mg Q6H PRN    psyllium husk (aspartame) 3.4 gram packet 1 packet Daily    sevelamer carbonate pwpk 1.6 g TID    sodium chloride 0.9% bolus 250 mL 250 mL PRN    sodium chloride 0.9% bolus 250 mL 250 mL PRN    sodium chloride 0.9% flush 10 mL PRN    sodium phosphate 20.01 mmol in dextrose 5 % (D5W) 250 mL IVPB PRN    sodium phosphate 30 mmol in dextrose 5 % (D5W) 250 mL IVPB PRN    sodium phosphate 39.99 mmol in dextrose 5 % (D5W) 250 mL IVPB PRN       Objective:     Vital Signs (Most Recent):  Temp: 99.6 °F (37.6 °C)  (02/23/24 0715)  Pulse: 101 (02/23/24 0915)  Resp: (!) 38 (02/23/24 0915)  BP: (!) 159/70 (02/23/24 0915)  SpO2: 100 % (02/23/24 0915) Vital Signs (24h Range):  Temp:  [98.3 °F (36.8 °C)-99.6 °F (37.6 °C)] 99.6 °F (37.6 °C)  Pulse:  [] 101  Resp:  [3-38] 38  SpO2:  [99 %-100 %] 100 %  BP: (124-182)/(62-86) 159/70     Weight: (!) 150.9 kg (332 lb 10.8 oz) (02/21/24 0300)  Body mass index is 55.36 kg/m².  Body surface area is 2.63 meters squared.    I/O last 3 completed shifts:  In: 3720.4 [I.V.:2041.5; NG/GT:800; IV Piggyback:878.9]  Out: 5002 [Urine:2110; Other:2692; Stool:200]     Physical Exam  Vitals and nursing note reviewed.   Constitutional:       General: She is not in acute distress.     Appearance: She is ill-appearing.   HENT:      Head: Normocephalic and atraumatic.   Eyes:      Extraocular Movements: Extraocular movements intact.      Conjunctiva/sclera: Conjunctivae normal.   Neck:      Comments: Left IJ Trialysis catheter  Cardiovascular:      Rate and Rhythm: Normal rate and regular rhythm.   Pulmonary:      Effort: Pulmonary effort is normal.      Comments: Intubated, on spontaneous    Abdominal:      General: There is no distension.      Palpations: Abdomen is soft.      Tenderness: There is no abdominal tenderness.   Genitourinary:     Comments: Glynn in place    Skin:     General: Skin is warm and dry.      Comments: Wound vac in place to R thigh/groin to suction   Neurological:      General: No focal deficit present.      Mental Status: She is oriented to person, place, and time.   Psychiatric:         Mood and Affect: Mood normal.         Behavior: Behavior normal.          Significant Labs:  All labs within the past 24 hours have been reviewed.     Significant Imaging:

## 2024-02-23 NOTE — SUBJECTIVE & OBJECTIVE
Interval History: Trached, on spont peep of 8 fio2 of 30. Afebrile. OR yesterday for trach and peg and closure of more inferior wound, wound vac placed in right groin. WV has had 50 cc of output in past 24 hours. Urine 1420 cc/24 hours.     Medications:  Continuous Infusions:   sodium chloride 0.9% Stopped (02/23/24 0202)    dextrose 10 % in water (D10W)       Scheduled Meds:   amLODIPine  10 mg Per G Tube Daily    carvediloL  25 mg Per G Tube BID    furosemide (LASIX) injection  120 mg Intravenous TID    heparin (porcine)  7,500 Units Subcutaneous Q8H    insulin aspart U-100  6 Units Subcutaneous Q4H    insulin detemir U-100  14 Units Subcutaneous Daily    psyllium husk (aspartame)  1 packet Per G Tube Daily    sevelamer carbonate  1.6 g Per G Tube TID     PRN Meds:sodium chloride 0.9%, acetaminophen, albuterol-ipratropium, dextrose 10 % in water (D10W), dextrose 10%, dextrose 10%, glucagon (human recombinant), glucose, glucose, hydrALAZINE, insulin aspart U-100, labetalol, naloxone, ondansetron, oxyCODONE, prochlorperazine, sodium chloride 0.9%, sodium chloride 0.9%, sodium chloride 0.9%     Review of patient's allergies indicates:  No Known Allergies  Objective:     Vital Signs (Most Recent):  Temp: 99.8 °F (37.7 °C) (02/23/24 1100)  Pulse: 91 (02/23/24 1145)  Resp: (!) 27 (02/23/24 1145)  BP: 137/62 (02/23/24 1130)  SpO2: 100 % (02/23/24 1145) Vital Signs (24h Range):  Temp:  [98.3 °F (36.8 °C)-99.8 °F (37.7 °C)] 99.8 °F (37.7 °C)  Pulse:  [] 91  Resp:  [3-35] 27  SpO2:  [99 %-100 %] 100 %  BP: (124-182)/(60-86) 137/62     Weight: (!) 150.9 kg (332 lb 10.8 oz)  Body mass index is 55.36 kg/m².    Intake/Output - Last 3 Shifts         02/21 0700 02/22 0659 02/22 0700 02/23 0659 02/23 0700 02/24 0659    I.V. (mL/kg) 48.8 (0.3) 2041.5 (13.5)     Blood 355      NG/ 500 430    IV Piggyback 295.5 699     Total Intake(mL/kg) 1649.3 (10.9) 3240.5 (21.5) 430 (2.8)    Urine (mL/kg/hr) 1165 (0.3) 1420 (0.4)  75 (0.1)    Other 175 2592     Stool 0 200     Total Output 1340 4212 75    Net +309.3 -971.6 +355           Stool Occurrence 4 x 2 x              Physical Exam  Vitals and nursing note reviewed.   Constitutional:       General: She is not in acute distress.     Appearance: She is ill-appearing.   HENT:      Head: Normocephalic and atraumatic.   Eyes:      Extraocular Movements: Extraocular movements intact.      Conjunctiva/sclera: Conjunctivae normal.   Neck:      Comments: Left IJ Trialysis catheter  Cardiovascular:      Rate and Rhythm: Normal rate and regular rhythm.   Pulmonary:      Effort: Pulmonary effort is normal.      Comments: trached    Abdominal:      General: There is no distension.      Palpations: Abdomen is soft.      Tenderness: There is no abdominal tenderness.      Comments: PEG in place   Genitourinary:     Comments: Glynn in place    Skin:     General: Skin is warm and dry.      Comments: Wound vac in place to R thigh/groin to suction  Re approximated skin clean dry and intact   Neurological:      General: No focal deficit present.      Mental Status: She is oriented to person, place, and time.   Psychiatric:         Mood and Affect: Mood normal.         Behavior: Behavior normal.          Significant Labs:  I have reviewed all pertinent lab results within the past 24 hours.    Significant Diagnostics:  I have reviewed all pertinent imaging results/findings within the past 24 hours.

## 2024-02-23 NOTE — CONSULTS
IR consult received for TDC placement. Informed ordering provider that IR does not place TDCs in pts who are currently admitted to the ICU unless they have a set discharge date from ICU to LTACH. Please reconsult IR once she is stepped down to the floor or if she has a set discharge date from ICU to LTACH and we will be happy to place a TDC.     Molly Merida PA-C  Interventional Radiology   Spectra: [phone]

## 2024-02-23 NOTE — CARE UPDATE
I have reviewed the chart of Sarah Saravia who is hospitalized for the following:    Active Hospital Problems    Diagnosis    *Ayaz's gangrene    Status post tracheostomy    S/P percutaneous endoscopic gastrostomy (PEG) tube placement    Acute cystitis without hematuria    Fever    Hyperphosphatemia    Hypoalbuminemia    Cerebral infarction    Encounter for palliative care    Ac isch multi vasc territories stroke    Leukocytosis    Encephalopathy    Encephalopathy, metabolic    Acute hypoxemic respiratory failure    Weaning from mechanically assisted ventilation initiated    Acute blood loss anemia    Acute renal failure with tubular necrosis    New onset type 2 diabetes mellitus    Metabolic acidosis    ALVARADO (acute kidney injury)    Hyponatremia    Diabetic acidosis without coma    Morbid (severe) obesity due to excess calories    Severe sepsis      Bree Lewis, POONAM  Unit Based MARCOS

## 2024-02-23 NOTE — PROGRESS NOTES
"[facility]  Endocrinology  Progress Note    Admit Date: 1/29/2024     Reason for Consult: Management of T2DM, Hyperglycemia     Surgical Procedure and Date: n/a    Diabetes diagnosis year: new onset     Home Diabetes Medications:  none per chart review and family present at bedside     Lab Results   Component Value Date    HGBA1C 10.4 (H) 01/30/2024       Diabetes Complications include:     Hyperglycemia, DKA at OSH     Complicating diabetes co morbidities:   Obesity       HPI:   Patient is a 53 y.o. female with a diagnosis of obesity (BMI 53) admitted with N/V and R groin wound found to be in DKA at OSH. 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 While at OSH pt developed acute respiratory failure requiring intubation. Pulmonology was consulted for ventilator management and critical illness. Nephrology was consulted for worsening vishal in the setting of lactic acidosis and septic shock likely ATN, sCr elevated at 3.8 on admit now 4.0 post HD. Pt being admitted to SICU for surgical critical care management. Immediate plans include hemodynamic stabilization, weaning of respiratory support, and RRT.  Endocrinology consulted for management of T2DM.                 Interval HPI:   nterval HPI:   No acute events overnight. Patient in room 34825/69749 A. Blood glucose stable. BG at goal on current insulin regimen (Schedule Insulin and SSI (TPN/TF)). Steroid use- None .     Renal function- Abnormal - Cr 2.1   Vasopressors-  None      Diet NPO      Eating:   NPO  Nausea: No  Hypoglycemia and intervention: No  Fever: No  TPN and/or TF: Yes TF  If yes, type of TF/TPN and rate: 50 cc.hr    BP (!) 157/72   Pulse 95   Temp 99.6 °F (37.6 °C) (Oral)   Resp (!) 28   Ht 5' 5" (1.651 m)   Wt (!) 150.9 kg (332 lb 10.8 oz)   LMP 01/01/2024 (Approximate) Comment: tubal ligation  SpO2 100%   BMI 55.36 kg/m² " "    Labs Reviewed and Include    Recent Labs   Lab 02/23/24  0304   *   CALCIUM 9.5   ALBUMIN 2.1*   PROT 7.6      K 4.8   CO2 20*      BUN 22*   CREATININE 2.1*   ALKPHOS 116   ALT 15   AST 36   BILITOT 0.4     Lab Results   Component Value Date    WBC 12.58 02/23/2024    HGB 8.4 (L) 02/23/2024    HCT 26.5 (L) 02/23/2024    MCV 90 02/23/2024     02/23/2024     No results for input(s): "TSH", "FREET4" in the last 168 hours.  Lab Results   Component Value Date    HGBA1C 10.4 (H) 01/30/2024       Nutritional status:   Body mass index is 55.36 kg/m².  Lab Results   Component Value Date    ALBUMIN 2.1 (L) 02/23/2024    ALBUMIN 2.1 (L) 02/22/2024    ALBUMIN 2.0 (L) 02/22/2024     No results found for: "PREALBUMIN"    Estimated Creatinine Clearance: 46.3 mL/min (A) (based on SCr of 2.1 mg/dL (H)).    Accu-Checks  Recent Labs     02/21/24  1905 02/21/24  2305 02/22/24  0209 02/22/24  0808 02/22/24  1125 02/22/24  1523 02/22/24  1921 02/22/24  2309 02/23/24  0304 02/23/24  0733   POCTGLUCOSE 166* 158* 138* 139* 161* 160* 143* 127* 146* 142*       Current Medications and/or Treatments Impacting Glycemic Control  Immunotherapy:    Immunosuppressants       None          Steroids:   Hormones (From admission, onward)      None          Pressors:    Autonomic Drugs (From admission, onward)      None          Hyperglycemia/Diabetes Medications:   Antihyperglycemics (From admission, onward)      Start     Stop Route Frequency Ordered    02/13/24 0915  insulin detemir U-100 (Levemir) pen 14 Units         -- SubQ Daily 02/13/24 0906    02/09/24 1200  insulin aspart U-100 pen 6 Units         -- SubQ Every 4 hours 02/09/24 0901    02/03/24 1010  insulin aspart U-100 pen 0-10 Units         -- SubQ Every 4 hours PRN 02/03/24 0911            ASSESSMENT and PLAN    Renal/  * Ayaz's gangrene  Managed per primary team  Optimize BG control        ALVARADO (acute kidney injury)  Lab Results   Component Value Date    " CREATININE 2.1 (H) 02/23/2024     Avoid insulin stacking  Titrate insulin slowly       Endocrine  New onset type 2 diabetes mellitus  BG goal 140-180  No previous hx of T2DM per family at bedside. A1c of 10.4. DKA at OSH. On TF. BG stable on regimen.        Plan:  - Continue Levemir 14 units daily.  - Continue Novolog 6 units q 4 hrs while on tube feeding (HOLD if tube feeding is stopped or BG < 100)   - Continue Moderate Dose Correction Scale  - BG monitoring q 4 hrs while NPO /TF    ** Please call Endocrine for any BG related issues **      Discharge plans: TBD    Lab Results   Component Value Date    HGBA1C 10.4 (H) 01/30/2024             Salvador Alfaro PA-C  Endocrinology  [facility]

## 2024-02-23 NOTE — PLAN OF CARE
02/23/24 1042   Post-Acute Status   Post-Acute Authorization Placement   Post-Acute Placement Status Referrals Sent   Coverage Payor: MEDICAID - [facility] -   Hospital Resources/Appts/Education Provided Provided patient/caregiver with written discharge plan information   Patient choice form signed by patient/caregiver List with quality metrics by geographic area provided   Discharge Delays None known at this time   Discharge Plan   Discharge Plan A Long-term acute care facility (LTAC)   Discharge Plan B Skilled Nursing Facility     KARI provided patient and daughter a list of in-network LTAC facilities. Patient and daughter chose [facility]. KARI sent referral this morning via [employer]. SW to follow.     ANDREY Banerjee  [facility]  [phone]      10:55 AM  Correction: Discharge Plan B: [facility].   ANDREY Banerjee  [facility]  [phone]    2:51 PM  [facility] called to notify SW they will accept patient once able to step down from SLED and tolerate HD. MD notified. KARI will follow.   ANDREY Banerjee  [facility]  [phone]

## 2024-02-23 NOTE — ASSESSMENT & PLAN NOTE
BG goal 140-180  No previous hx of T2DM per family at bedside. A1c of 10.4. DKA at OSH. On TF. BG stable on regimen.        Plan:  - Continue Levemir 14 units daily.  - Continue Novolog 6 units q 4 hrs while on tube feeding (HOLD if tube feeding is stopped or BG < 100)   - Continue Moderate Dose Correction Scale  - BG monitoring q 4 hrs while NPO /TF    ** Please call Endocrine for any BG related issues **      Discharge plans: TBD    Lab Results   Component Value Date    HGBA1C 10.4 (H) 01/30/2024

## 2024-02-23 NOTE — ASSESSMENT & PLAN NOTE
Neuro/Psych:   #Acute Encephalopathy  CTH 2/3 with scattered hypoattenuation likely representing age indeterminate infarcts. EEG findings consistent with moderate-severe encephalopathy. MRI 2/6: Several scattered punctate acute infarcts throughout the bilateral frontal lobes, centrum semiovale, basal ganglia, corpus callosum, and cerebellar hemispheres.  CTA 2/6: Atherosclerotic plaquing of the carotid bifurcations and proximal ICAs with less than 50% proximal ICA stenosis by NASCET criteria . Repeat CTH and MRI/MRA unchanged from prior exams. S/p multiple wound vac exchanges. Palliative onboard with family wanting full measures.    Neuro/Psych  Repeat CTH and MRI/MRA stable from initial reads. LP 2/16. Elevated WBCs and protein consistent with bacterial meningitis.  -- Sedation: None  -- Pain: kermit tylenol, dialudid and oxy prn  -- GCS 4T: E2, V1T, M1; exam stable  -- Neurology following; appreciate recs  -- Neurovascular following previously; signed off 2/17  -- Palliative following; GOC conversation 2/7; appreciate recs  -- CSF culture with no growth, awaiting further cultures             Cards:   -- HDS  -- goal SBP < 180, MAP >65  -- normotensive; hydralazine and labetalol prns  -- Continue amlodipine 10mg daily; coreg 25mg BID      Pulm:   #Acute Respiratory Failure  -- tracheostomy. Transition to trach collar  -- Goal O2 sat > 90%      Renal:  #ALVARADO on CKD  #ATN 2/2 septic shock  2/13 [facility] trialysis  -- Nephrology following; appreciate recs  -- On sevelamer  -- Replace lytes as needed.  -- Continue lasix 120mg IV tid  -- Plan to place a parmacath for LTAC dialysis.    Recent Labs   Lab 02/22/24  1522 02/22/24  2118 02/23/24  0304   BUN 51* 24* 22*   CREATININE 4.1* 2.2* 2.1*        FEN / GI:   -- Daily CMPs  -- NGT in place   -- Replace lytes as needed  -- Nutrition: NPO, TF (Peptamen Intense) at goal  -- GI PPX   -- Nutrition following; appreciate recs  -- Continue psyllium and sevelamer      ID:     #Ayaz's gangrene  s/p wound vac exchange/ debridement x4 for nec fascitis. R groin tissue with proteus, sensitive to ceftriaxone. Growing bacteroids as well. Urine cx 2/18 growing C. Indologens.  -- Abx: zosyn, EOT 2/22   -- If febrile, transition back to ceftriaxone and flagyl. If concerned with UTI, add on bactrim for 3 day course.  -- ID following ; appreciate recs     Heme/Onc:  #Anemia  -- Daily CBC  -- Heparin DVT ppx  -- Transfuse if Hgb <7     Endo:   #IDDM  Initially presented with DKA   Placed on insulin gtt 2/3 and dced 2/12.    - q4h BG monitoring while NPO  - Detemir 14 units daily  - Novolog 6units q4h while on TFs  - Moderate dose SSI PRN      PPx:   Feeding: PEG tube  Analgesia/Sedation: See above  Thromboembolic prevention: SQH   HOB >30: Y  Stress Ulcer ppx: Famotidine  Glucose control: Goal 140-180 g/dl, kermit detemir and novolog, SSI, Endo following  Bowel reg: psyllium  Invasive Lines/Drains/Airway: Glynn, lynda, LIJ Trialysis, PIV x2      Dispo/Code Status/Palliative:   -- SICU / Full Code   -- Planning transition to LTAC

## 2024-02-23 NOTE — PROGRESS NOTES
[facility]  Nephrology  Progress Note    Patient Name: Sarah Saravia  MRN: [id number]  Admission Date: 1/29/2024  Hospital Length of Stay: 25 days  Attending Provider: Steve Cole MD   Primary Care Physician: [facility]  Principal Problem:Ayaz's gangrene    Subjective:     HPI: Sarah Saravia is a 53 year old female with a past medical history of obesity (BMI 53) admitted with N/V and R groin wound found to be in DKA at OSH.  She underwent a CT abdomen and pelvis that showed extensive soft tissue air throughout the right groin and inguinal region and extending to involve the right lower quadrant anterior abdominal wall with extensive soft tissue air also seen involving the proximal medial aspect of the right thigh, concerning for gas-forming infection. She is s/p OR debridement for necrotizing fascitis on 1/29/24 and take back for 1/31/24. Right groin tissue growing proteus, susceptibilities still pending. Currently on meropenem and clindamycin which was d/c'd on 1/31/24. While at OSH pt developed acute respiratory failure requiring intubation. Nephrology was consulted for worsening alvarado in the setting of lactic acidosis and septic shock likely ATN, sCr elevated at 3.8 on admit.  OR today for debridement with possible closure and wound vac placement. Last HD 2/1. Net -1.3L. Electrolytes stable. Nephrology consulted for ALVARADO.     Interval History:     No acute events overnight. S/p trach/peg. Hemodynamically stable. Tolerated RRT well overnight.     Review of patient's allergies indicates:  No Known Allergies  Current Facility-Administered Medications   Medication Frequency    0.9%  NaCl infusion (CRRT USE ONLY) Continuous    0.9%  NaCl infusion PRN    acetaminophen tablet 650 mg Q4H PRN    albuterol-ipratropium 2.5 mg-0.5 mg/3 mL nebulizer solution 3 mL Q4H PRN    amLODIPine tablet 10 mg Daily    calcium carbonate 200 mg calcium (500 mg) chewable tablet 500  mg BID    carvediloL tablet 25 mg BID    dextrose 10 % infusion Continuous PRN    dextrose 10% bolus 125 mL 125 mL PRN    dextrose 10% bolus 250 mL 250 mL PRN    furosemide injection 120 mg TID    glucagon (human recombinant) injection 1 mg PRN    glucose chewable tablet 16 g PRN    glucose chewable tablet 24 g PRN    heparin (porcine) injection 7,500 Units Q8H    hydrALAZINE injection 10 mg Q4H PRN    insulin aspart U-100 pen 0-10 Units Q4H PRN    insulin aspart U-100 pen 6 Units Q4H    insulin detemir U-100 (Levemir) pen 14 Units Daily    labetalol 20 mg/4 mL (5 mg/mL) IV syring Q6H PRN    magnesium sulfate 2g in water 50mL IVPB (premix) PRN    naloxone 0.4 mg/mL injection 0.02 mg PRN    ondansetron injection 4 mg Q6H PRN    oxyCODONE 5 mg/5 mL solution 5 mg Q6H PRN    [START ON 2/24/2024] polyethylene glycol packet 17 g Every other day    prochlorperazine injection Soln 5 mg Q6H PRN    psyllium husk (aspartame) 3.4 gram packet 1 packet Daily    sevelamer carbonate pwpk 1.6 g TID    sodium chloride 0.9% bolus 250 mL 250 mL PRN    sodium chloride 0.9% bolus 250 mL 250 mL PRN    sodium chloride 0.9% flush 10 mL PRN    sodium phosphate 20.01 mmol in dextrose 5 % (D5W) 250 mL IVPB PRN    sodium phosphate 30 mmol in dextrose 5 % (D5W) 250 mL IVPB PRN    sodium phosphate 39.99 mmol in dextrose 5 % (D5W) 250 mL IVPB PRN       Objective:     Vital Signs (Most Recent):  Temp: 99.6 °F (37.6 °C) (02/23/24 0715)  Pulse: 101 (02/23/24 0915)  Resp: (!) 38 (02/23/24 0915)  BP: (!) 159/70 (02/23/24 0915)  SpO2: 100 % (02/23/24 0915) Vital Signs (24h Range):  Temp:  [98.3 °F (36.8 °C)-99.6 °F (37.6 °C)] 99.6 °F (37.6 °C)  Pulse:  [] 101  Resp:  [3-38] 38  SpO2:  [99 %-100 %] 100 %  BP: (124-182)/(62-86) 159/70     Weight: (!) 150.9 kg (332 lb 10.8 oz) (02/21/24 0300)  Body mass index is 55.36 kg/m².  Body surface area is 2.63 meters squared.    I/O last 3 completed shifts:  In: 3720.4 [I.V.:2041.5; NG/GT:800; IV  Piggyback:878.9]  Out: 5002 [Urine:2110; Other:2692; Stool:200]     Physical Exam  Vitals and nursing note reviewed.   Constitutional:       General: She is not in acute distress.     Appearance: She is ill-appearing.   HENT:      Head: Normocephalic and atraumatic.   Eyes:      Extraocular Movements: Extraocular movements intact.      Conjunctiva/sclera: Conjunctivae normal.   Neck:      Comments: Left IJ Trialysis catheter  Cardiovascular:      Rate and Rhythm: Normal rate and regular rhythm.   Pulmonary:      Effort: Pulmonary effort is normal.      Comments: Intubated, on spontaneous    Abdominal:      General: There is no distension.      Palpations: Abdomen is soft.      Tenderness: There is no abdominal tenderness.   Genitourinary:     Comments: Glynn in place    Skin:     General: Skin is warm and dry.      Comments: Wound vac in place to R thigh/groin to suction   Neurological:      General: No focal deficit present.      Mental Status: She is oriented to person, place, and time.   Psychiatric:         Mood and Affect: Mood normal.         Behavior: Behavior normal.          Significant Labs:  All labs within the past 24 hours have been reviewed.     Significant Imaging:    Assessment/Plan:     Neuro  Cerebral infarction  Per primary team.     Renal/  * Ayaz's gangrene  - management per primary     ALVARADO (acute kidney injury)  Transfer from [facility]. Admitted for fourniers gangrene. Initiated HD on 1/31. ALVARADO 2/2 ATN in setting of septic shock. Arrived with CR 3.8. Unknown baseline. S/P OR debridement with possible closure and wound vac placement     ALVARADO most likley ATN in setting of septic shock     Plan/Recommendation  - No urgent indication for RRT at this time. Will continue to monitor need on daily basis.   - Please consult IR for perm cath placement if clear from infection stand point.   - Please dose all ABX in accordance to RRT schedule (zosyn, etc)  - Continue IV lasix 120mg TID.   -Daily  RFP   -Strict I&Os  -Avoid nephrotoxins, if possible         Thank you for your consult. I will follow-up with patient. Please contact us if you have any additional questions.    Case discussed with attending. Attestation to follow.       Antione Hazel DO  Nephrology  [facility]

## 2024-02-23 NOTE — ASSESSMENT & PLAN NOTE
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 Unfortunately, has multiple infarcts on MRI brain with unchanged neuro status, however, family would like to proceed with all measures. S/p trach, peg, and lower wound closure, replacement of wound vac in right groin on 2/22/24. Doing well.    - Last [place] change 2/22  - okay to use PEG tube  - LP 2/16 - NGTD  - ID consulted for urine cultures - recommend broadening to zosyn    - UA growing Burkholderia species and Chryseibacterium Indologenes, recommend IV zosyn with stop date of 2/22  - Palliative following given overall poor prognosis, daughter would like everything done  - Stable on spontaneous with trach   - Okay for DVT ppx   - Remainder of care per SICU    Dispo: will need LTAC placement

## 2024-02-23 NOTE — OP NOTE
[facility]  General Surgery  Operative Note    DATE: 2/22/2024    PREOPERATIVE DIAGNOSIS:   1. 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]  On mechanically assisted ventilation [Z99.11]  Cerebral infarction, unspecified mechanism [I63.9]  Acute hypoxemic respiratory failure [J96.01]   2. Respiratory failure  3. Ventilator dependence   4. Dysphagia  5. Need for prolonged enteral access    POSTOPERATIVE DIAGNOSIS:   1. 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]  On mechanically assisted ventilation [Z99.11]  Cerebral infarction, unspecified mechanism [I63.9]  Acute hypoxemic respiratory failure [J96.01]   2. Respiratory failure  3. Ventilator dependence   4. Dysphagia  5. Need for prolonged enteral access    Procedure(s):  CREATION, TRACHEOSTOMY  EGD, WITH PEG TUBE INSERTION  CLOSURE, WOUND  REPLACEMENT, WOUND VAC     Surgeon(s) and Role:     * Steve Cole MD - Primary     * Enzo Bagley MD - Resident - Assisting     * Chicho Dale MD - Resident - Assisting    ANESTHESIA: General endotracheal anesthesia    FINDINGS: Open tracheostomy placement with upper endoscopy and placement of percutaneous gastrostomy tube    INDICATION: Ms. Saravia is a 53 y.o. female admitted to [facility] with a necrotizing soft tissue infection to the right lower quadrant and right perineal area.  She was discovered to have multiple cerebral infarcts resulting in prolonged ventilator support and dysphagia.  We discussed with the family her goals of care and agreed upon placement with uncontrolled open tracheostomy and percutaneous endoscopic gastrostomy placement in addition to partial wound closure and wound VAC exchange in the operating room. After discussing the alternatives, benefits, and risks for the proposed operation, the medical decision maker for Ms. Saravia wished to proceed. All of their questions concerning the operation were answered, and they expressed full understanding of the procedure and the risks/benefits  associated, and signed informed consent was obtained.    PROCEDURE IN DETAIL: Patient was brought to the operating room where she was placed in supine position on the operating room table and underwent general anesthesia without complication. A shoulder roll was placed and the field was sterilely prepped out and draped in standard fashion, and a timeout identifying the correct patient, placement, procedure, and preoperative antibiotics was called with everyone in agreement.  Tracheal landmarks were identified and a vertical midline incision was made with a #15 scalpel through the level of the epidermis and dermis. Subcutaneous tissues platysma were divided with electrocautery, and the strap muscles were identified and split in the midline with blunt a cautery dissection. Pretracheal fascia was divided to expose the anterior surface of the trachea. At this point the patient was preoxygenated, and utilizing a #11 scalpel, the second tracheal space was sharply incised and the second tracheal ring was divided inferiorly in the midline to create an I incision tracheotomy. The endotracheal tube was withdrawn under direct vision to above the tracheotomy and a cuffed #8 tracheostomy tube was placed into the airway, the balloon was insufflated, and the obturator was exchanged for the inner cannula. End tidal CO2 was then confirmed by our anesthesia colleagues. The endotracheal tube was removed and the tracheostomy tube was secured in place using Velcro tracheostomy tape and 2-0 Prolene sutures.    The patient's abdomen was prepped and draped. An upper endoscope was introduced into the oropharynx and guided down into the esophagus and stomach. The stomach was insufflated with air. The endoscope was withdrawn into the stomach and identified an appropriate position for gastrostomy tube placement 2 finger-breadths below the left subcostal margin. Palpation of the anterior abdominal wall at this point was visualized  endoscopically and transillumination from the endoscope was visualized through the anterior abdominal wall. A small skin incision was made and the stomach was cannulated with the angiocatheter. A guidewire was placed and was snared using the endoscope. The endoscope, snare, and guidewire were all withdrawn from the patient's mouth. A 20-Sudanese gastrostomy tube was loaded onto the guidewire and pulled through the anterior abdominal wall via Seldinger technique. Repeat endoscopy was performed with the gastrostomy tube at the 6 cm gloria at the skin. There was no blanching of the gastric mucosa, and when the tube was twisted, the button did not grab the mucosa. Gastric insufflation was evacuated, and the endoscope was removed. Esophagus was visualized on withdrawal and demonstrated no abnormalities. The gastrostomy tube was secured in place using the supplied devices and connected to a bag for gravity drainage.     Patient was then placed in lithotomy position on the black stirrups and the right lower quadrant right wound were prepped and draped in standard fashion.  There was healthy granulation tissue throughout the right perineal wound and the edges were able to come together reapproximated the wound.  Decision was made to place a large Penrose drain in the base of the wound exiting superiorly and inferiorly affixed with a 2-0 nylon suture.  The right perineal wound was then able to be closed with multiple interrupted 0 nylon sutures in vertical mattress formation.  Once reapproximated the right lower quadrant was inspected irrigated and negative pressure wound VAC was placed over the area.    The patient was then transferred to the ICU in critical, but stable condition.   This completed the proposed operation. All instruments, needles, and sponge counts were reported as correct x2 by the nursing staff. Transferred back to ICU in stable condition.     EBL: <10mL    COMPLICATIONS: none apparent.    SPECIMEN:  none    DRAINS: 8 cuffed shiley tracheostomy tube and a 20-Lithuanian percutaneous gastrostomy tube     DISPOSITION: ICU.    ATTESTATION:  I was present and scrubbed for the entire procedure including all critical portions of the procedure.    Steve Cole MD  Acute Care Surgery and Surgical Critical Care  [facility]  2/22/2024

## 2024-02-23 NOTE — PROGRESS NOTES
02/22/24 2053   Treatment   Treatment Type SLED   Treatment Status Restart   Dialysis Machine Number [id number]   Dialyzer Time (hours) 0   BVP (Liters) 0 L   Solutions Labeled and Current  Yes   Access Temporary Cath;Left;IJ   Catheter Dressing Intact  Yes   Alarms Engaged Yes   CRRT Comments CRRT RST   $ CRRT Charges   $ CRRT Charges Restart     Report received from primary RN. CRRT restarted per MD order.

## 2024-02-23 NOTE — SUBJECTIVE & OBJECTIVE
Interval History/Significant Events: Pt seen and examined at bedside. BISHNU MULLER. POD 1 s/p tracheostomy, PEG and wound vac replacement.       Follow-up For: Procedure(s) (LRB):  CREATION, TRACHEOSTOMY (N/A)  EGD, WITH PEG TUBE INSERTION (N/A)  CLOSURE, WOUND (Right)  REPLACEMENT, WOUND VAC (Right)    Post-Operative Day: 1 Day Post-Op    Objective:     Vital Signs (Most Recent):  Temp: 99.1 °F (37.3 °C) (02/23/24 0300)  Pulse: 96 (02/23/24 0645)  Resp: (!) 29 (02/23/24 0645)  BP: (!) 152/72 (02/23/24 0630)  SpO2: 100 % (02/23/24 0645) Vital Signs (24h Range):  Temp:  [98.3 °F (36.8 °C)-99.1 °F (37.3 °C)] 99.1 °F (37.3 °C)  Pulse:  [81-96] 96  Resp:  [3-29] 29  SpO2:  [99 %-100 %] 100 %  BP: (124-182)/(62-86) 152/72     Weight: (!) 150.9 kg (332 lb 10.8 oz)  Body mass index is 55.36 kg/m².      Intake/Output Summary (Last 24 hours) at 2/23/2024 0648  Last data filed at 2/23/2024 0600  Gross per 24 hour   Intake 3240.45 ml   Output 4212 ml   Net -971.55 ml          Physical Exam     Vitals and nursing note reviewed.   Constitutional:       General: She is not in acute distress.     Appearance: She is ill-appearing.   HENT:      Head: Normocephalic and atraumatic.   Eyes:      Extraocular Movements: Extraocular movements intact.      Conjunctiva/sclera: Conjunctivae normal.   Neck:      Comments: Left IJ Trialysis catheter  Cardiovascular:      Rate and Rhythm: Normal rate and regular rhythm.   Pulmonary:      Effort: Pulmonary effort is normal.      Comments: tracheostomy  Abdominal:      General: There is no distension.      Palpations: Abdomen is soft.      Tenderness: There is no abdominal tenderness.   Genitourinary:     Comments: Glynn in place  Skin:     General: Skin is warm and dry.      Comments: Wound vac in place to groin to suction   Neurological:      General: No focal deficit present.      Mental Status: She is oriented to person, place, and time.   Psychiatric:         Mood and Affect: Mood normal.          Behavior: Behavior normal.       Vents:  Vent Mode: Spont (02/23/24 0319)  Set Rate: 18 BPM (02/06/24 0349)  Vt Set: 420 mL (02/06/24 0349)  Pressure Support: 10 cmH20 (02/23/24 0319)  PEEP/CPAP: 5 cmH20 (02/23/24 0319)  Oxygen Concentration (%): 30 (02/23/24 0645)  Peak Airway Pressure: 16 cmH20 (02/23/24 0319)  Plateau Pressure: 15 cmH20 (02/23/24 0319)  Total Ve: 12.7 L/m (02/23/24 0319)  Negative Inspiratory Force (cm H2O): 0 (02/23/24 0319)  F/VT Ratio<105 (RSBI): (!) 32.33 (02/23/24 0319)    Lines/Drains/Airways       Central Venous Catheter Line  Duration             Trialysis (Dialysis) Catheter 02/11/24 2303 left internal jugular 11 days              Drain  Duration                  Urethral Catheter 02/16/24 1215 6 days         Fecal Incontinence  02/22/24 0900 <1 day         Gastrostomy/Enterostomy 02/22/24 1200 LUQ <1 day         Open Drain 02/22/24 1414 Tube - 1 Right Thigh Penrose Other (see comments) <1 day              Airway  Duration             Adult Surgical Airway 02/22/24 Shiley Cuffed 8.0 / 85 mm 1 day              Peripheral Intravenous Line  Duration                  Peripheral IV - Single Lumen 02/22/24 0015 22 G Left;Posterior Forearm 1 day         Peripheral IV - Single Lumen 02/22/24 0020 20 G Posterior;Right Forearm 1 day                    Significant Labs:    CBC/Anemia Profile:  Recent Labs   Lab 02/21/24  1414 02/22/24  0209 02/23/24  0304   WBC 9.39 10.37 12.58   HGB 8.2* 8.3* 8.4*   HCT 26.3* 26.6* 26.5*    318 322   MCV 90 89 90   RDW 17.1* 17.5* 17.9*        Chemistries:  Recent Labs   Lab 02/22/24  0209 02/22/24  1522 02/22/24  2118 02/23/24  0304    138 137 136   K 5.1 5.2* 4.8 4.8    105 107 108   CO2 24 20* 23 20*   BUN 47* 51* 24* 22*   CREATININE 3.6* 4.1* 2.2* 2.1*   CALCIUM 9.0 8.9 9.5 9.5   ALBUMIN 2.0* 2.0* 2.1* 2.1*   PROT 7.7  --   --  7.6   BILITOT 0.3  --   --  0.4   ALKPHOS 126  --   --  116   ALT 14  --   --  15   AST 30  --   --   36   MG 2.2 2.2 1.9 2.3   PHOS 4.7* 5.9* 2.6* 2.7       Significant Imaging:  I have reviewed all pertinent imaging results/findings within the past 24 hours.

## 2024-02-23 NOTE — ASSESSMENT & PLAN NOTE
Lab Results   Component Value Date    CREATININE 2.1 (H) 02/23/2024     Avoid insulin stacking  Titrate insulin slowly

## 2024-02-23 NOTE — PROGRESS NOTES
[facility]  General Surgery  Progress Note    Subjective:     History of Present Illness:  53 year old morbidly obese female who does not routinely go to the doctor presents to the ED with malaise, nausea, vomiting, and groin, buttock, perineal swelling and tenderness. She began feeling ill a few days ago.     On arrival to the ED she is tachycardic to 120, hypertensive without fever. Labs demonstrate leukocytosis of 21, , with lactic acidosis and associated ALVARADO with cr 3.8, hyperglycemia with blood glucose of 824 accompanied by severe electrolyte derangements. CT imaging obtained demonstrates diffuse subcutaneous air along buttocks, perineum, anterior vulva, as well as bilateral thighs.     No prior abdominal surgeries. She denies problems with her heart or lungs. Non smoker. Does not routinely take any medications.    Post-Op Info:  Procedure(s) (LRB):  CREATION, TRACHEOSTOMY (N/A)  EGD, WITH PEG TUBE INSERTION (N/A)  CLOSURE, WOUND (Right)  REPLACEMENT, WOUND VAC (Right)   1 Day Post-Op     Interval History: Trached, on spont peep of 8 fio2 of 30. Afebrile. OR yesterday for trach and peg and closure of more inferior wound, wound vac placed in right groin. WV has had 50 cc of output in past 24 hours. Urine 1420 cc/24 hours.     Medications:  Continuous Infusions:   sodium chloride 0.9% Stopped (02/23/24 0202)    dextrose 10 % in water (D10W)       Scheduled Meds:   amLODIPine  10 mg Per G Tube Daily    carvediloL  25 mg Per G Tube BID    furosemide (LASIX) injection  120 mg Intravenous TID    heparin (porcine)  7,500 Units Subcutaneous Q8H    insulin aspart U-100  6 Units Subcutaneous Q4H    insulin detemir U-100  14 Units Subcutaneous Daily    psyllium husk (aspartame)  1 packet Per G Tube Daily    sevelamer carbonate  1.6 g Per G Tube TID     PRN Meds:sodium chloride 0.9%, acetaminophen, albuterol-ipratropium, dextrose 10 % in water (D10W), dextrose 10%, dextrose 10%, glucagon  (human recombinant), glucose, glucose, hydrALAZINE, insulin aspart U-100, labetalol, naloxone, ondansetron, oxyCODONE, prochlorperazine, sodium chloride 0.9%, sodium chloride 0.9%, sodium chloride 0.9%     Review of patient's allergies indicates:  No Known Allergies  Objective:     Vital Signs (Most Recent):  Temp: 99.8 °F (37.7 °C) (02/23/24 1100)  Pulse: 91 (02/23/24 1145)  Resp: (!) 27 (02/23/24 1145)  BP: 137/62 (02/23/24 1130)  SpO2: 100 % (02/23/24 1145) Vital Signs (24h Range):  Temp:  [98.3 °F (36.8 °C)-99.8 °F (37.7 °C)] 99.8 °F (37.7 °C)  Pulse:  [] 91  Resp:  [3-35] 27  SpO2:  [99 %-100 %] 100 %  BP: (124-182)/(60-86) 137/62     Weight: (!) 150.9 kg (332 lb 10.8 oz)  Body mass index is 55.36 kg/m².    Intake/Output - Last 3 Shifts         02/21 0700  02/22 0659 02/22 0700  02/23 0659 02/23 0700  02/24 0659    I.V. (mL/kg) 48.8 (0.3) 2041.5 (13.5)     Blood 355      NG/ 500 430    IV Piggyback 295.5 699     Total Intake(mL/kg) 1649.3 (10.9) 3240.5 (21.5) 430 (2.8)    Urine (mL/kg/hr) 1165 (0.3) 1420 (0.4) 75 (0.1)    Other 175 2592     Stool 0 200     Total Output 1340 4212 75    Net +309.3 -971.6 +355           Stool Occurrence 4 x 2 x              Physical Exam  Vitals and nursing note reviewed.   Constitutional:       General: She is not in acute distress.     Appearance: She is ill-appearing.   HENT:      Head: Normocephalic and atraumatic.   Eyes:      Extraocular Movements: Extraocular movements intact.      Conjunctiva/sclera: Conjunctivae normal.   Neck:      Comments: Left IJ Trialysis catheter  Cardiovascular:      Rate and Rhythm: Normal rate and regular rhythm.   Pulmonary:      Effort: Pulmonary effort is normal.      Comments: trached    Abdominal:      General: There is no distension.      Palpations: Abdomen is soft.      Tenderness: There is no abdominal tenderness.      Comments: PEG in place   Genitourinary:     Comments: Glynn in place    Skin:     General: Skin is warm and  dry.      Comments: Wound vac in place to R thigh/groin to suction  Re approximated skin clean dry and intact   Neurological:      General: No focal deficit present.      Mental Status: She is oriented to person, place, and time.   Psychiatric:         Mood and Affect: Mood normal.         Behavior: Behavior normal.          Significant Labs:  I have reviewed all pertinent lab results within the past 24 hours.    Significant Diagnostics:  I have reviewed all pertinent imaging results/findings within the past 24 hours.  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 Unfortunately, has multiple infarcts on MRI brain with unchanged neuro status, however, family would like to proceed with all measures. S/p trach, peg, and lower wound closure, replacement of wound vac in right groin on 2/22/24. Overall stable.    - Last WV change 2/22  - okay to use PEG tube  - LP 2/16 - NGTD  - ID consulted for urine cultures - recommend broadening to zosyn    - UA growing Burkholderia species and Chryseibacterium Indologenes, recommend IV zosyn with stop date of 2/22  - Palliative following given overall poor prognosis, daughter would like everything done  - Stable on spontaneous with trach   - Okay for DVT ppx   - Remainder of care per SICU    Dispo: will need LTAC placement         Dayo Salazar MD  General Surgery  [facility]

## 2024-02-23 NOTE — PLAN OF CARE
CM noted inpatient consult by Dr. Borges. Patient is ready to discharge when LTAC placement is arranged. SW will contact patient's daughter for choice facility to send referral.     Of note, patient must be tolerating HD to discharge to any post acute placement.       Dunia Burton RN  Weekend  - [facility] [place]  Spectra: [phone]

## 2024-02-24 LAB
ALBUMIN SERPL BCP-MCNC: 2 G/DL (ref 3.5–5.2)
ALBUMIN SERPL BCP-MCNC: 2.1 G/DL (ref 3.5–5.2)
ALP SERPL-CCNC: 110 U/L (ref 55–135)
ALT SERPL W/O P-5'-P-CCNC: 18 U/L (ref 10–44)
ANION GAP SERPL CALC-SCNC: 10 MMOL/L (ref 8–16)
ANION GAP SERPL CALC-SCNC: 10 MMOL/L (ref 8–16)
ANION GAP SERPL CALC-SCNC: 8 MMOL/L (ref 8–16)
ANION GAP SERPL CALC-SCNC: 9 MMOL/L (ref 8–16)
AST SERPL-CCNC: 29 U/L (ref 10–40)
BACTERIA CSF CULT: NO GROWTH
BASOPHILS # BLD AUTO: 0.08 K/UL (ref 0–0.2)
BASOPHILS NFR BLD: 0.7 % (ref 0–1.9)
BILIRUB SERPL-MCNC: 0.3 MG/DL (ref 0.1–1)
BUN SERPL-MCNC: 30 MG/DL (ref 6–20)
BUN SERPL-MCNC: 40 MG/DL (ref 6–20)
BUN SERPL-MCNC: 42 MG/DL (ref 6–20)
BUN SERPL-MCNC: 44 MG/DL (ref 6–20)
CALCIUM SERPL-MCNC: 8.7 MG/DL (ref 8.7–10.5)
CALCIUM SERPL-MCNC: 8.8 MG/DL (ref 8.7–10.5)
CALCIUM SERPL-MCNC: 8.9 MG/DL (ref 8.7–10.5)
CALCIUM SERPL-MCNC: 9.3 MG/DL (ref 8.7–10.5)
CHLORIDE SERPL-SCNC: 101 MMOL/L (ref 95–110)
CHLORIDE SERPL-SCNC: 103 MMOL/L (ref 95–110)
CHLORIDE SERPL-SCNC: 103 MMOL/L (ref 95–110)
CHLORIDE SERPL-SCNC: 104 MMOL/L (ref 95–110)
CO2 SERPL-SCNC: 22 MMOL/L (ref 23–29)
CO2 SERPL-SCNC: 23 MMOL/L (ref 23–29)
CO2 SERPL-SCNC: 25 MMOL/L (ref 23–29)
CO2 SERPL-SCNC: 25 MMOL/L (ref 23–29)
CREAT SERPL-MCNC: 2.9 MG/DL (ref 0.5–1.4)
CREAT SERPL-MCNC: 3.4 MG/DL (ref 0.5–1.4)
CREAT SERPL-MCNC: 4 MG/DL (ref 0.5–1.4)
CREAT SERPL-MCNC: 4.1 MG/DL (ref 0.5–1.4)
DIFFERENTIAL METHOD BLD: ABNORMAL
EOSINOPHIL # BLD AUTO: 0.7 K/UL (ref 0–0.5)
EOSINOPHIL NFR BLD: 6 % (ref 0–8)
ERYTHROCYTE [DISTWIDTH] IN BLOOD BY AUTOMATED COUNT: 17.7 % (ref 11.5–14.5)
EST. GFR  (NO RACE VARIABLE): 12.4 ML/MIN/1.73 M^2
EST. GFR  (NO RACE VARIABLE): 12.8 ML/MIN/1.73 M^2
EST. GFR  (NO RACE VARIABLE): 15.5 ML/MIN/1.73 M^2
EST. GFR  (NO RACE VARIABLE): 18.8 ML/MIN/1.73 M^2
GLUCOSE SERPL-MCNC: 134 MG/DL (ref 70–110)
GLUCOSE SERPL-MCNC: 154 MG/DL (ref 70–110)
GLUCOSE SERPL-MCNC: 154 MG/DL (ref 70–110)
GLUCOSE SERPL-MCNC: 155 MG/DL (ref 70–110)
GRAM STN SPEC: NORMAL
GRAM STN SPEC: NORMAL
HCT VFR BLD AUTO: 25.5 % (ref 37–48.5)
HGB BLD-MCNC: 7.8 G/DL (ref 12–16)
IMM GRANULOCYTES # BLD AUTO: 0.07 K/UL (ref 0–0.04)
IMM GRANULOCYTES NFR BLD AUTO: 0.6 % (ref 0–0.5)
LYMPHOCYTES # BLD AUTO: 1.9 K/UL (ref 1–4.8)
LYMPHOCYTES NFR BLD: 16.5 % (ref 18–48)
MAGNESIUM SERPL-MCNC: 2.1 MG/DL (ref 1.6–2.6)
MAGNESIUM SERPL-MCNC: 2.2 MG/DL (ref 1.6–2.6)
MAGNESIUM SERPL-MCNC: 2.4 MG/DL (ref 1.6–2.6)
MAGNESIUM SERPL-MCNC: 2.4 MG/DL (ref 1.6–2.6)
MCH RBC QN AUTO: 27.9 PG (ref 27–31)
MCHC RBC AUTO-ENTMCNC: 30.6 G/DL (ref 32–36)
MCV RBC AUTO: 91 FL (ref 82–98)
MONOCYTES # BLD AUTO: 1.4 K/UL (ref 0.3–1)
MONOCYTES NFR BLD: 11.6 % (ref 4–15)
NEUTROPHILS # BLD AUTO: 7.6 K/UL (ref 1.8–7.7)
NEUTROPHILS NFR BLD: 64.6 % (ref 38–73)
NRBC BLD-RTO: 0 /100 WBC
PHOSPHATE SERPL-MCNC: 2.4 MG/DL (ref 2.7–4.5)
PHOSPHATE SERPL-MCNC: 2.8 MG/DL (ref 2.7–4.5)
PHOSPHATE SERPL-MCNC: 3.3 MG/DL (ref 2.7–4.5)
PHOSPHATE SERPL-MCNC: 3.4 MG/DL (ref 2.7–4.5)
PLATELET # BLD AUTO: 387 K/UL (ref 150–450)
PMV BLD AUTO: 9.9 FL (ref 9.2–12.9)
POCT GLUCOSE: 139 MG/DL (ref 70–110)
POCT GLUCOSE: 166 MG/DL (ref 70–110)
POCT GLUCOSE: 166 MG/DL (ref 70–110)
POCT GLUCOSE: 171 MG/DL (ref 70–110)
POCT GLUCOSE: 176 MG/DL (ref 70–110)
POTASSIUM SERPL-SCNC: 4 MMOL/L (ref 3.5–5.1)
POTASSIUM SERPL-SCNC: 4.2 MMOL/L (ref 3.5–5.1)
POTASSIUM SERPL-SCNC: 4.8 MMOL/L (ref 3.5–5.1)
POTASSIUM SERPL-SCNC: 4.9 MMOL/L (ref 3.5–5.1)
PROT SERPL-MCNC: 7.5 G/DL (ref 6–8.4)
RBC # BLD AUTO: 2.8 M/UL (ref 4–5.4)
SODIUM SERPL-SCNC: 135 MMOL/L (ref 136–145)
SODIUM SERPL-SCNC: 135 MMOL/L (ref 136–145)
SODIUM SERPL-SCNC: 136 MMOL/L (ref 136–145)
SODIUM SERPL-SCNC: 137 MMOL/L (ref 136–145)
WBC # BLD AUTO: 11.67 K/UL (ref 3.9–12.7)

## 2024-02-24 PROCEDURE — 99291 CRITICAL CARE FIRST HOUR: CPT | Mod: 24,25,, | Performed by: SURGERY

## 2024-02-24 PROCEDURE — 83735 ASSAY OF MAGNESIUM: CPT | Performed by: STUDENT IN AN ORGANIZED HEALTH CARE EDUCATION/TRAINING PROGRAM

## 2024-02-24 PROCEDURE — 63600175 PHARM REV CODE 636 W HCPCS

## 2024-02-24 PROCEDURE — 99900026 HC AIRWAY MAINTENANCE (STAT)

## 2024-02-24 PROCEDURE — 27000221 HC OXYGEN, UP TO 24 HOURS

## 2024-02-24 PROCEDURE — 99900035 HC TECH TIME PER 15 MIN (STAT)

## 2024-02-24 PROCEDURE — 20000000 HC ICU ROOM

## 2024-02-24 PROCEDURE — 99232 SBSQ HOSP IP/OBS MODERATE 35: CPT | Mod: ,,, | Performed by: PHYSICIAN ASSISTANT

## 2024-02-24 PROCEDURE — 84100 ASSAY OF PHOSPHORUS: CPT | Performed by: STUDENT IN AN ORGANIZED HEALTH CARE EDUCATION/TRAINING PROGRAM

## 2024-02-24 PROCEDURE — 25000242 PHARM REV CODE 250 ALT 637 W/ HCPCS: Performed by: STUDENT IN AN ORGANIZED HEALTH CARE EDUCATION/TRAINING PROGRAM

## 2024-02-24 PROCEDURE — 80100014 HC HEMODIALYSIS 1:1

## 2024-02-24 PROCEDURE — 25000003 PHARM REV CODE 250: Performed by: STUDENT IN AN ORGANIZED HEALTH CARE EDUCATION/TRAINING PROGRAM

## 2024-02-24 PROCEDURE — 80069 RENAL FUNCTION PANEL: CPT | Mod: 91 | Performed by: STUDENT IN AN ORGANIZED HEALTH CARE EDUCATION/TRAINING PROGRAM

## 2024-02-24 PROCEDURE — 27000207 HC ISOLATION

## 2024-02-24 PROCEDURE — 80053 COMPREHEN METABOLIC PANEL: CPT

## 2024-02-24 PROCEDURE — 83735 ASSAY OF MAGNESIUM: CPT | Mod: 91 | Performed by: STUDENT IN AN ORGANIZED HEALTH CARE EDUCATION/TRAINING PROGRAM

## 2024-02-24 PROCEDURE — 94761 N-INVAS EAR/PLS OXIMETRY MLT: CPT

## 2024-02-24 PROCEDURE — 99233 SBSQ HOSP IP/OBS HIGH 50: CPT | Mod: ,,, | Performed by: INTERNAL MEDICINE

## 2024-02-24 PROCEDURE — 63600175 PHARM REV CODE 636 W HCPCS: Performed by: HOSPITALIST

## 2024-02-24 PROCEDURE — 85025 COMPLETE CBC W/AUTO DIFF WBC: CPT

## 2024-02-24 RX ORDER — SODIUM CHLORIDE 9 MG/ML
INJECTION, SOLUTION INTRAVENOUS ONCE
Status: CANCELLED | OUTPATIENT
Start: 2024-02-24 | End: 2024-02-24

## 2024-02-24 RX ADMIN — HEPARIN SODIUM 7500 UNITS: 5000 INJECTION INTRAVENOUS; SUBCUTANEOUS at 02:02

## 2024-02-24 RX ADMIN — SEVELAMER CARBONATE 1.6 G: 800 POWDER, FOR SUSPENSION ORAL at 09:02

## 2024-02-24 RX ADMIN — INSULIN ASPART 2 UNITS: 100 INJECTION, SOLUTION INTRAVENOUS; SUBCUTANEOUS at 12:02

## 2024-02-24 RX ADMIN — INSULIN ASPART 6 UNITS: 100 INJECTION, SOLUTION INTRAVENOUS; SUBCUTANEOUS at 06:02

## 2024-02-24 RX ADMIN — FUROSEMIDE 120 MG: 10 INJECTION, SOLUTION INTRAVENOUS at 08:02

## 2024-02-24 RX ADMIN — HYDRALAZINE HYDROCHLORIDE 10 MG: 20 INJECTION, SOLUTION INTRAMUSCULAR; INTRAVENOUS at 06:02

## 2024-02-24 RX ADMIN — INSULIN ASPART 2 UNITS: 100 INJECTION, SOLUTION INTRAVENOUS; SUBCUTANEOUS at 08:02

## 2024-02-24 RX ADMIN — CARVEDILOL 25 MG: 25 TABLET, FILM COATED ORAL at 09:02

## 2024-02-24 RX ADMIN — FUROSEMIDE 120 MG: 10 INJECTION, SOLUTION INTRAVENOUS at 09:02

## 2024-02-24 RX ADMIN — SEVELAMER CARBONATE 1.6 G: 800 POWDER, FOR SUSPENSION ORAL at 08:02

## 2024-02-24 RX ADMIN — CARVEDILOL 25 MG: 25 TABLET, FILM COATED ORAL at 08:02

## 2024-02-24 RX ADMIN — PSYLLIUM HUSK 1 PACKET: 3.4 POWDER ORAL at 08:02

## 2024-02-24 RX ADMIN — INSULIN ASPART 6 UNITS: 100 INJECTION, SOLUTION INTRAVENOUS; SUBCUTANEOUS at 12:02

## 2024-02-24 RX ADMIN — INSULIN ASPART 6 UNITS: 100 INJECTION, SOLUTION INTRAVENOUS; SUBCUTANEOUS at 03:02

## 2024-02-24 RX ADMIN — HEPARIN SODIUM 7500 UNITS: 5000 INJECTION INTRAVENOUS; SUBCUTANEOUS at 09:02

## 2024-02-24 RX ADMIN — INSULIN DETEMIR 14 UNITS: 100 INJECTION, SOLUTION SUBCUTANEOUS at 08:02

## 2024-02-24 RX ADMIN — HEPARIN SODIUM 7500 UNITS: 5000 INJECTION INTRAVENOUS; SUBCUTANEOUS at 05:02

## 2024-02-24 RX ADMIN — OXYCODONE HYDROCHLORIDE 5 MG: 5 SOLUTION ORAL at 08:02

## 2024-02-24 RX ADMIN — FUROSEMIDE 120 MG: 10 INJECTION, SOLUTION INTRAVENOUS at 02:02

## 2024-02-24 RX ADMIN — INSULIN ASPART 6 UNITS: 100 INJECTION, SOLUTION INTRAVENOUS; SUBCUTANEOUS at 07:02

## 2024-02-24 RX ADMIN — SEVELAMER CARBONATE 1.6 G: 800 POWDER, FOR SUSPENSION ORAL at 02:02

## 2024-02-24 RX ADMIN — INSULIN ASPART 6 UNITS: 100 INJECTION, SOLUTION INTRAVENOUS; SUBCUTANEOUS at 08:02

## 2024-02-24 RX ADMIN — AMLODIPINE BESYLATE 10 MG: 10 TABLET ORAL at 08:02

## 2024-02-24 NOTE — ASSESSMENT & PLAN NOTE
Neuro/Psych:   Acute Encephalopathy  CTH 2/3 with scattered hypoattenuation likely representing age indeterminate infarcts. EEG findings consistent with moderate-severe encephalopathy. MRI 2/6: Several scattered punctate acute infarcts throughout the bilateral frontal lobes, centrum semiovale, basal ganglia, corpus callosum, and cerebellar hemispheres.  CTA 2/6: Atherosclerotic plaquing of the carotid bifurcations and proximal ICAs with less than 50% proximal ICA stenosis by NASCET criteria . Repeat CTH and MRI/MRA unchanged from prior exams. S/p multiple wound vac exchanges. Palliative onboard with family wanting full measures.    Neuro/Psych  Repeat CTH and MRI/MRA stable from initial reads. LP 2/16. Elevated WBCs and protein consistent with bacterial meningitis.  -- Sedation: None  -- Pain: kermit tylenol, dialudid and oxy prn  -- GCS 4T: E2, V1T, M1; exam stable  -- Neurology following; appreciate recs  -- Neurovascular following previously; signed off 2/17  -- Palliative following; GOC conversation 2/7; appreciate recs  -- CSF culture 2/15 with no growth             Cards:   -- HDS  -- goal SBP < 180, MAP >65  -- normotensive; hydralazine and labetalol prns  -- Continue amlodipine 10mg daily; coreg 25mg BID      Pulm:   Acute Respiratory Failure  -- tracheostomy on 2/22.  Tolerating trach collar  -- Goal O2 sat > 90%      Renal:  ALVARADO on CKD  ATN 2/2 septic shock  2/13 [facility] trialysis  -- Nephrology following; appreciate recs   -- iHD today for metabolic clearance and volume removal  -- On sevelamer  -- Replace lytes as needed.  -- Continue lasix 120mg IV tid   -- Plan to place a permacath for LTAC dialysis-- once we have a set discharge date from ICU to LTACH, interventional IR will place line; appreciate help    Recent Labs   Lab 02/23/24  0304 02/23/24  2203 02/24/24  0333   BUN 22* 37* 44*  42*   CREATININE 2.1* 3.4* 4.0*  4.1*        FEN / GI:   -- Daily CMPs  -- NGT in place   -- Replace lytes as needed  --  Nutrition: NPO, TF (Peptamen Intense) at goal  -- GI PPX   -- Nutrition following; appreciate recs  -- Continue psyllium and sevelamer      ID:   Ayaz's gangrene  s/p wound vac exchange/ debridement x4 for nec fascitis. R groin tissue with proteus, sensitive to ceftriaxone. Growing bacteroids as well. Urine cx 2/18 growing C. Indologens.  -- Abx: none  --completed zosyn, EOT 2/22   -- ID signed off 2/21     Heme/Onc:  -- Daily CBC  -- Heparin DVT ppx  -- Transfuse if Hgb <7  -- hgb 7.8     Endo:   IDDM  Initially presented with DKA   Placed on insulin gtt 2/3 and dced 2/12.    - q4h BG monitoring while NPO  - Detemir 14 units daily  - Novolog 6units q4h while on TFs  - Moderate dose SSI PRN      PPx:   Feeding: TF at goal  Analgesia/Sedation: See above  Thromboembolic prevention: SQH   HOB >30: Y  Stress Ulcer ppx: Famotidine  Glucose control: Goal 140-180 g/dl, kermit detemir and novolog, SSI, Endo following  Bowel reg: psyllium  Invasive Lines/Drains/Airway: Glynn, lynda, LIJ Trialysis, PIV x2      Dispo/Code Status/Palliative:   -- SICU / Full Code   -- Planning transition to LTAC; appreciate social work help

## 2024-02-24 NOTE — SUBJECTIVE & OBJECTIVE
"Interval HPI:   No acute events overnight. Patient in room 09650/34290 A. Blood glucose stable. BG at goal on current insulin regimen (Schedule Insulin and SSI (TPN/TF)). Steroid use- None .      Renal function- Abnormal - Cr  4.0  Vasopressors-  None      Diet NPO      Eating:   NPO  Nausea: No  Hypoglycemia and intervention: No  Fever: No  TPN and/or TF: Yes TF  If yes, type of TF/TPN and rate: 50 cc.hr    BP (!) 150/71   Pulse 90   Temp 98.8 °F (37.1 °C) (Oral)   Resp (!) 29   Ht 5' 5" (1.651 m)   Wt (!) 150.9 kg (332 lb 10.8 oz)   LMP 01/01/2024 (Approximate) Comment: tubal ligation  SpO2 98%   BMI 55.36 kg/m²     Labs Reviewed and Include    Recent Labs   Lab 02/24/24  0333   *  155*   CALCIUM 9.3  8.9   ALBUMIN 2.0*  2.0*   PROT 7.5   *  135*   K 4.9  4.8   CO2 23  22*     103   BUN 44*  42*   CREATININE 4.0*  4.1*   ALKPHOS 110   ALT 18   AST 29   BILITOT 0.3     Lab Results   Component Value Date    WBC 11.67 02/24/2024    HGB 7.8 (L) 02/24/2024    HCT 25.5 (L) 02/24/2024    MCV 91 02/24/2024     02/24/2024     No results for input(s): "TSH", "FREET4" in the last 168 hours.  Lab Results   Component Value Date    HGBA1C 10.4 (H) 01/30/2024       Nutritional status:   Body mass index is 55.36 kg/m².  Lab Results   Component Value Date    ALBUMIN 2.0 (L) 02/24/2024    ALBUMIN 2.0 (L) 02/24/2024    ALBUMIN 2.1 (L) 02/23/2024     No results found for: "PREALBUMIN"    Estimated Creatinine Clearance: 24.3 mL/min (A) (based on SCr of 4 mg/dL (H)).    Accu-Checks  Recent Labs     02/22/24  1921 02/22/24  2309 02/23/24  0304 02/23/24  0733 02/23/24  1105 02/23/24  1512 02/23/24  2031 02/23/24  2337 02/24/24  0336 02/24/24  0846   POCTGLUCOSE 143* 127* 146* 142* 144* 166* 180* 166* 166* 171*       Current Medications and/or Treatments Impacting Glycemic Control  Immunotherapy:    Immunosuppressants       None          Steroids:   Hormones (From admission, onward)      None "          Pressors:    Autonomic Drugs (From admission, onward)      None          Hyperglycemia/Diabetes Medications:   Antihyperglycemics (From admission, onward)      Start     Stop Route Frequency Ordered    02/13/24 0915  insulin detemir U-100 (Levemir) pen 14 Units         -- SubQ Daily 02/13/24 0906    02/09/24 1200  insulin aspart U-100 pen 6 Units         -- SubQ Every 4 hours 02/09/24 0901    02/03/24 1010  insulin aspart U-100 pen 0-10 Units         -- SubQ Every 4 hours PRN 02/03/24 0911

## 2024-02-24 NOTE — SUBJECTIVE & OBJECTIVE
Interval History/Significant Events: VSS.  TA[place]USON. POD 2 s/p tracheostomy, PEG and wound vac replacement. On trach collar      Follow-up For: Procedure(s) (LRB):  CREATION, TRACHEOSTOMY (N/A)  EGD, WITH PEG TUBE INSERTION (N/A)  CLOSURE, WOUND (Right)  REPLACEMENT, WOUND VAC (Right)    Post-Operative Day: 2 Day Post-Op    Objective:     Vital Signs (Most Recent):  Temp: 98.8 °F (37.1 °C) (02/24/24 1100)  Pulse: 90 (02/24/24 1315)  Resp: (!) 29 (02/24/24 1315)  BP: 133/62 (02/24/24 1315)  SpO2: 95 % (02/24/24 1315) Vital Signs (24h Range):  Temp:  [98.8 °F (37.1 °C)-99.7 °F (37.6 °C)] 98.8 °F (37.1 °C)  Pulse:  [82-97] 90  Resp:  [13-37] 29  SpO2:  [95 %-100 %] 95 %  BP: (123-167)/(57-81) 133/62     Weight: (!) 150.9 kg (332 lb 10.8 oz)  Body mass index is 55.36 kg/m².      Intake/Output Summary (Last 24 hours) at 2/24/2024 1333  Last data filed at 2/24/2024 1100  Gross per 24 hour   Intake 1440 ml   Output 100 ml   Net 1340 ml            Physical Exam     Vitals and nursing note reviewed.   Constitutional:       General: She is not in acute distress.     Appearance: She is ill-appearing.   HENT:      Head: Normocephalic and atraumatic.   Eyes:      Extraocular Movements: Extraocular movements intact.      Conjunctiva/sclera: Conjunctivae normal.   Neck:      Comments: Left IJ Trialysis catheter  Cardiovascular:      Rate and Rhythm: Normal rate and regular rhythm.   Pulmonary:      Effort: Pulmonary effort is normal.      Comments: tracheostomy  Abdominal:      General: There is no distension.      Palpations: Abdomen is soft.      Tenderness: There is no abdominal tenderness.   Genitourinary:     Comments: Glynn in place  Skin:     General: Skin is warm and dry.      Comments: Wound vac in place to groin to suction   Neurological:      General: No focal deficit present.      Mental Status: She is oriented to person, place, and time.   Psychiatric:         Mood and Affect: Mood normal.         Behavior: Behavior  normal.       Vents:  Vent Mode: Spont (02/23/24 0708)  Set Rate: 18 BPM (02/06/24 0349)  Vt Set: 420 mL (02/06/24 0349)  Pressure Support: 10 cmH20 (02/23/24 0708)  PEEP/CPAP: 8 cmH20 (02/23/24 0708)  Oxygen Concentration (%): 28 (02/24/24 1109)  Peak Airway Pressure: 43 cmH20 (02/23/24 0708)  Plateau Pressure: 15 cmH20 (02/23/24 0708)  Total Ve: 5.16 L/m (02/23/24 0708)  Negative Inspiratory Force (cm H2O): 0 (02/23/24 0708)  F/VT Ratio<105 (RSBI): (!) 32.33 (02/23/24 0319)    Lines/Drains/Airways       Central Venous Catheter Line  Duration             Trialysis (Dialysis) Catheter 02/11/24 2303 left internal jugular 12 days              Drain  Duration                  Fecal Incontinence  02/22/24 0900 2 days         Gastrostomy/Enterostomy 02/22/24 1200 LUQ 2 days         Open Drain 02/22/24 1414 Tube - 1 Right Thigh Penrose Other (see comments) 1 day              Airway  Duration             Adult Surgical Airway 02/22/24 Shiley Cuffed 8.0 / 85 mm 2 days              Peripheral Intravenous Line  Duration                  Peripheral IV - Single Lumen 02/22/24 0015 22 G Left;Posterior Forearm 2 days         Peripheral IV - Single Lumen 02/22/24 0020 20 G Posterior;Right Forearm 2 days                    Significant Labs:    CBC/Anemia Profile:  Recent Labs   Lab 02/23/24  0304 02/24/24  0333   WBC 12.58 11.67   HGB 8.4* 7.8*   HCT 26.5* 25.5*    387   MCV 90 91   RDW 17.9* 17.7*          Chemistries:  Recent Labs   Lab 02/23/24  0304 02/23/24 2203 02/24/24  0333    137 135*  135*   K 4.8 4.8 4.9  4.8    106 104  103   CO2 20* 22* 23  22*   BUN 22* 37* 44*  42*   CREATININE 2.1* 3.4* 4.0*  4.1*   CALCIUM 9.5 9.2 9.3  8.9   ALBUMIN 2.1* 2.1* 2.0*  2.0*   PROT 7.6  --  7.5   BILITOT 0.4  --  0.3   ALKPHOS 116  --  110   ALT 15  --  18   AST 36  --  29   MG 2.3 2.4 2.4  2.4   PHOS 2.7 3.1 3.4  3.3         Significant Imaging:  I have reviewed all pertinent imaging  results/findings within the past 24 hours.

## 2024-02-24 NOTE — SUBJECTIVE & OBJECTIVE
Interval History: HDS, afebrile, breathing spontaneously. WV with 100cc output recorded.     Medications:  Continuous Infusions:   dextrose 10 % in water (D10W)       Scheduled Meds:   amLODIPine  10 mg Per G Tube Daily    carvediloL  25 mg Per G Tube BID    furosemide (LASIX) injection  120 mg Intravenous TID    heparin (porcine)  7,500 Units Subcutaneous Q8H    insulin aspart U-100  6 Units Subcutaneous Q4H    insulin detemir U-100  14 Units Subcutaneous Daily    psyllium husk (aspartame)  1 packet Per G Tube Daily    sevelamer carbonate  1.6 g Per G Tube TID     PRN Meds:sodium chloride 0.9%, acetaminophen, albuterol-ipratropium, dextrose 10 % in water (D10W), dextrose 10%, dextrose 10%, glucagon (human recombinant), glucose, glucose, hydrALAZINE, insulin aspart U-100, labetalol, naloxone, ondansetron, oxyCODONE, prochlorperazine, sodium chloride 0.9%, sodium chloride 0.9%, sodium chloride 0.9%     Review of patient's allergies indicates:  No Known Allergies  Objective:     Vital Signs (Most Recent):  Temp: 98.8 °F (37.1 °C) (02/24/24 0715)  Pulse: 90 (02/24/24 0848)  Resp: (!) 29 (02/24/24 0848)  BP: (!) 150/71 (02/24/24 0848)  SpO2: 98 % (02/24/24 0745) Vital Signs (24h Range):  Temp:  [98.8 °F (37.1 °C)-99.8 °F (37.7 °C)] 98.8 °F (37.1 °C)  Pulse:  [87-97] 90  Resp:  [13-37] 29  SpO2:  [97 %-100 %] 98 %  BP: (129-167)/(59-81) 150/71     Weight: (!) 150.9 kg (332 lb 10.8 oz)  Body mass index is 55.36 kg/m².    Intake/Output - Last 3 Shifts         02/22 0700 02/23 0659 02/23 0700 02/24 0659 02/24 0700 02/25 0659    I.V. (mL/kg) 2041.5 (13.5)      Blood       NG/ 1770     IV Piggyback 699      Total Intake(mL/kg) 3240.5 (21.5) 1770 (11.7)     Urine (mL/kg/hr) 1420 (0.4) 75 (0)     Other 2592 100     Stool 200      Total Output 4212 175     Net -971.6 +1595            Stool Occurrence 2 x               Physical Exam  Vitals and nursing note reviewed.   Constitutional:       General: She is not in acute  distress.     Appearance: She is ill-appearing.   HENT:      Head: Normocephalic and atraumatic.   Eyes:      Extraocular Movements: Extraocular movements intact.      Conjunctiva/sclera: Conjunctivae normal.   Neck:      Comments: Left IJ Trialysis catheter  Cardiovascular:      Rate and Rhythm: Normal rate and regular rhythm.   Pulmonary:      Effort: Pulmonary effort is normal.      Comments: trached    Abdominal:      General: There is no distension.      Palpations: Abdomen is soft.      Tenderness: There is no abdominal tenderness.      Comments: PEG in place   Genitourinary:     Comments: Glynn in place    Skin:     General: Skin is warm and dry.      Comments: Wound vac in place to R thigh/groin to suction  Re approximated skin clean dry and intact   Neurological:      General: No focal deficit present.      Mental Status: She is oriented to person, place, and time.   Psychiatric:         Mood and Affect: Mood normal.         Behavior: Behavior normal.          Significant Labs:  I have reviewed all pertinent lab results within the past 24 hours.    Significant Diagnostics:  I have reviewed all pertinent imaging results/findings within the past 24 hours.

## 2024-02-24 NOTE — PROGRESS NOTES
Bedside HD initiated, /. Uf net  goal set for 1 liter as tolerated. B/P 139/66, pulse 89, o2 sat on trach collar 97% on 26 % fio2. Patient remains no responsive to verbal stimuli .

## 2024-02-24 NOTE — ASSESSMENT & PLAN NOTE
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 Unfortunately, has multiple infarcts on MRI brain with unchanged neuro status, however, family would like to proceed with all measures. S/p trach, peg, and lower wound closure, replacement of wound vac in right groin on 2/22/24. Doing okay and doing better from a respiratory standpoint.    - Last WV change 2/22  - okay to use PEG tube  - LP 2/16 - NGTD  - ID consulted for urine cultures - recommend broadening to zosyn    - UA growing Burkholderia species and Chryseibacterium Indologenes, recommend IV zosyn with stop date of 2/22  - Palliative following given overall poor prognosis, daughter would like everything done  - Stable on spontaneous with trach   - Okay for DVT ppx   - Remainder of care per SICU    Dispo: will need LTAC placement

## 2024-02-24 NOTE — ASSESSMENT & PLAN NOTE
Lab Results   Component Value Date    CREATININE 4.0 (H) 02/24/2024    CREATININE 4.1 (H) 02/24/2024     Avoid insulin stacking  Titrate insulin slowly

## 2024-02-24 NOTE — PROGRESS NOTES
Bedside HD completed, Blood returned and catheter ports flushed with normal saline. Ports capped and secured, post B/P 153/73. Pulse 92. O2 sat 96%. Net uf removed 1 liter

## 2024-02-24 NOTE — ASSESSMENT & PLAN NOTE
Transfer from [facility]. Admitted for fourniers gangrene. Initiated HD on 1/31. ALVARADO 2/2 ATN in setting of septic shock. Arrived with CR 3.8. Unknown baseline. S/P OR debridement with possible closure and wound vac placement     ALVARADO most likley ATN in setting of septic shock     Plan/Recommendation  - iHD today for metabolic clearance and volume removal.    - Please consult IR for perm cath placement if clear from infection stand point.   - Please dose all ABX in accordance to RRT schedule (zosyn, etc)  - Continue IV lasix 120mg TID.   -Daily RFP   -Strict I&Os  -Avoid nephrotoxins, if possible

## 2024-02-24 NOTE — PROGRESS NOTES
[facility]  Nephrology  Progress Note    Patient Name: Sarah Saravia  MRN: [id number]  Admission Date: 1/29/2024  Hospital Length of Stay: 26 days  Attending Provider: Steve Cole MD   Primary Care Physician: Patricia [facility]  Principal Problem:Ayaz's gangrene    Subjective:     HPI: Sarah Saravia is a 53 year old female with a past medical history of obesity (BMI 53) admitted with N/V and R groin wound found to be in DKA at OSH.  She underwent a CT abdomen and pelvis that showed extensive soft tissue air throughout the right groin and inguinal region and extending to involve the right lower quadrant anterior abdominal wall with extensive soft tissue air also seen involving the proximal medial aspect of the right thigh, concerning for gas-forming infection. She is s/p OR debridement for necrotizing fascitis on 1/29/24 and take back for 1/31/24. Right groin tissue growing proteus, susceptibilities still pending. Currently on meropenem and clindamycin which was d/c'd on 1/31/24. While at OSH pt developed acute respiratory failure requiring intubation. Nephrology was consulted for worsening alvarado in the setting of lactic acidosis and septic shock likely ATN, sCr elevated at 3.8 on admit.  OR today for debridement with possible closure and wound vac placement. Last HD 2/1. Net -1.3L. Electrolytes stable. Nephrology consulted for ALVARADO.     Interval History:     No acute events overnight. Patient awaiting LTACH placement. Scheduled for iHD today. Mentation unchanged.     Review of patient's allergies indicates:  No Known Allergies  Current Facility-Administered Medications   Medication Frequency    0.9%  NaCl infusion PRN    acetaminophen tablet 650 mg Q4H PRN    albuterol-ipratropium 2.5 mg-0.5 mg/3 mL nebulizer solution 3 mL Q4H PRN    amLODIPine tablet 10 mg Daily    carvediloL tablet 25 mg BID    dextrose 10 % infusion Continuous PRN    dextrose 10% bolus 125 mL 125  mL PRN    dextrose 10% bolus 250 mL 250 mL PRN    furosemide injection 120 mg TID    glucagon (human recombinant) injection 1 mg PRN    glucose chewable tablet 16 g PRN    glucose chewable tablet 24 g PRN    heparin (porcine) injection 7,500 Units Q8H    hydrALAZINE injection 10 mg Q4H PRN    insulin aspart U-100 pen 0-10 Units Q4H PRN    insulin aspart U-100 pen 6 Units Q4H    insulin detemir U-100 (Levemir) pen 14 Units Daily    labetalol 20 mg/4 mL (5 mg/mL) IV syring Q6H PRN    naloxone 0.4 mg/mL injection 0.02 mg PRN    ondansetron injection 4 mg Q6H PRN    oxyCODONE 5 mg/5 mL solution 5 mg Q6H PRN    prochlorperazine injection Soln 5 mg Q6H PRN    psyllium husk (aspartame) 3.4 gram packet 1 packet Daily    sevelamer carbonate pwpk 1.6 g TID    sodium chloride 0.9% bolus 250 mL 250 mL PRN    sodium chloride 0.9% bolus 250 mL 250 mL PRN    sodium chloride 0.9% flush 10 mL PRN       Objective:     Vital Signs (Most Recent):  Temp: 98.8 °F (37.1 °C) (02/24/24 0715)  Pulse: 90 (02/24/24 0848)  Resp: (!) 29 (02/24/24 0848)  BP: (!) 150/71 (02/24/24 0848)  SpO2: 98 % (02/24/24 0745) Vital Signs (24h Range):  Temp:  [98.8 °F (37.1 °C)-99.8 °F (37.7 °C)] 98.8 °F (37.1 °C)  Pulse:  [87-97] 90  Resp:  [13-37] 29  SpO2:  [97 %-100 %] 98 %  BP: (129-167)/(59-81) 150/71     Weight: (!) 150.9 kg (332 lb 10.8 oz) (02/21/24 0300)  Body mass index is 55.36 kg/m².  Body surface area is 2.63 meters squared.    I/O last 3 completed shifts:  In: 4100.8 [I.V.:1791.5; NG/GT:2170; IV Piggyback:139.3]  Out: 3226 [Urine:695; Other:2331; Stool:200]     Physical Exam  Vitals and nursing note reviewed.   Constitutional:       General: She is not in acute distress.     Appearance: She is ill-appearing.   HENT:      Head: Normocephalic and atraumatic.   Eyes:      Extraocular Movements: Extraocular movements intact.      Conjunctiva/sclera: Conjunctivae normal.   Neck:      Comments: Left IJ Trialysis catheter  Cardiovascular:      Rate and  Rhythm: Normal rate and regular rhythm.   Pulmonary:      Effort: Pulmonary effort is normal.      Comments: trached    Abdominal:      General: There is no distension.      Palpations: Abdomen is soft.      Tenderness: There is no abdominal tenderness.      Comments: PEG in place   Genitourinary:     Comments: Glynn in place    Skin:     General: Skin is warm and dry.      Comments: Wound vac in place to R thigh/groin to suction  Re approximated skin clean dry and intact   Neurological:      General: No focal deficit present.      Mental Status: She is oriented to person, place, and time.   Psychiatric:         Mood and Affect: Mood normal.         Behavior: Behavior normal.          Significant Labs:  All labs within the past 24 hours have been reviewed.     Significant Imaging:  Labs: Reviewed  Assessment/Plan:     Neuro  Cerebral infarction  Per primary team.     Renal/  * Ayaz's gangrene  - management per primary     ALVARADO (acute kidney injury)  Transfer from [facility]. Admitted for fourniers gangrene. Initiated HD on 1/31. ALVARADO 2/2 ATN in setting of septic shock. Arrived with CR 3.8. Unknown baseline. S/P OR debridement with possible closure and wound vac placement     ALVARADO most likley ATN in setting of septic shock     Plan/Recommendation  - iHD today for metabolic clearance and volume removal.    - Please consult IR for perm cath placement if clear from infection stand point.   - Please dose all ABX in accordance to RRT schedule (zosyn, etc)  - Continue IV lasix 120mg TID.   -Daily RFP   -Strict I&Os  -Avoid nephrotoxins, if possible         Thank you for your consult. I will follow-up with patient. Please contact us if you have any additional questions.    Case discussed with attending. Attestation to follow.       Antione Hazel DO  Nephrology  [facility]

## 2024-02-24 NOTE — PROGRESS NOTES
[facility]  Critical Care - Surgery  Progress Note    Patient Name: Sarah Saravia  MRN: [id number]  Admission Date: 1/29/2024  Hospital Length of Stay: 26 days  Code Status: Full Code  Attending Provider: Steve Cole MD  [facility]   Principal Problem: Ayaz's gangrene    Subjective:   Interval History/Significant Events: VSS.  NAEON. POD 2 s/p tracheostomy, PEG and wound vac replacement. On trach collar      Follow-up For: Procedure(s) (LRB):  CREATION, TRACHEOSTOMY (N/A)  EGD, WITH PEG TUBE INSERTION (N/A)  CLOSURE, WOUND (Right)  REPLACEMENT, WOUND VAC (Right)    Post-Operative Day: 2 Day Post-Op    Objective:     Vital Signs (Most Recent):  Temp: 98.8 °F (37.1 °C) (02/24/24 1100)  Pulse: 90 (02/24/24 1315)  Resp: (!) 29 (02/24/24 1315)  BP: 133/62 (02/24/24 1315)  SpO2: 95 % (02/24/24 1315) Vital Signs (24h Range):  Temp:  [98.8 °F (37.1 °C)-99.7 °F (37.6 °C)] 98.8 °F (37.1 °C)  Pulse:  [82-97] 90  Resp:  [13-37] 29  SpO2:  [95 %-100 %] 95 %  BP: (123-167)/(57-81) 133/62     Weight: (!) 150.9 kg (332 lb 10.8 oz)  Body mass index is 55.36 kg/m².      Intake/Output Summary (Last 24 hours) at 2/24/2024 1333  Last data filed at 2/24/2024 1100  Gross per 24 hour   Intake 1440 ml   Output 100 ml   Net 1340 ml            Physical Exam     Vitals and nursing note reviewed.   Constitutional:       General: She is not in acute distress.     Appearance: She is ill-appearing.   HENT:      Head: Normocephalic and atraumatic.   Eyes:      Extraocular Movements: Extraocular movements intact.      Conjunctiva/sclera: Conjunctivae normal.   Neck:      Comments: Left IJ Trialysis catheter  Cardiovascular:      Rate and Rhythm: Normal rate and regular rhythm.   Pulmonary:      Effort: Pulmonary effort is normal.      Comments: tracheostomy  Abdominal:      General: There is no distension.      Palpations: Abdomen is soft.      Tenderness: There is  no abdominal tenderness.   Genitourinary:     Comments: Glynn in place  Skin:     General: Skin is warm and dry.      Comments: Wound vac in place to groin to suction   Neurological:      General: No focal deficit present.      Mental Status: She is oriented to person, place, and time.   Psychiatric:         Mood and Affect: Mood normal.         Behavior: Behavior normal.       Vents:  Vent Mode: Spont (02/23/24 0708)  Set Rate: 18 BPM (02/06/24 0349)  Vt Set: 420 mL (02/06/24 0349)  Pressure Support: 10 cmH20 (02/23/24 0708)  PEEP/CPAP: 8 cmH20 (02/23/24 0708)  Oxygen Concentration (%): 28 (02/24/24 1109)  Peak Airway Pressure: 43 cmH20 (02/23/24 0708)  Plateau Pressure: 15 cmH20 (02/23/24 0708)  Total Ve: 5.16 L/m (02/23/24 0708)  Negative Inspiratory Force (cm H2O): 0 (02/23/24 0708)  F/VT Ratio<105 (RSBI): (!) 32.33 (02/23/24 0319)    Lines/Drains/Airways       Central Venous Catheter Line  Duration             Trialysis (Dialysis) Catheter 02/11/24 2303 left internal jugular 12 days              Drain  Duration                  Fecal Incontinence  02/22/24 0900 2 days         Gastrostomy/Enterostomy 02/22/24 1200 LUQ 2 days         Open Drain 02/22/24 1414 Tube - 1 Right Thigh Penrose Other (see comments) 1 day              Airway  Duration             Adult Surgical Airway 02/22/24 Shiley Cuffed 8.0 / 85 mm 2 days              Peripheral Intravenous Line  Duration                  Peripheral IV - Single Lumen 02/22/24 0015 22 G Left;Posterior Forearm 2 days         Peripheral IV - Single Lumen 02/22/24 0020 20 G Posterior;Right Forearm 2 days                    Significant Labs:    CBC/Anemia Profile:  Recent Labs   Lab 02/23/24  0304 02/24/24  0333   WBC 12.58 11.67   HGB 8.4* 7.8*   HCT 26.5* 25.5*    387   MCV 90 91   RDW 17.9* 17.7*          Chemistries:  Recent Labs   Lab 02/23/24  0304 02/23/24 2203 02/24/24  0333    137 135*  135*   K 4.8 4.8 4.9  4.8    106 104  103    CO2 20* 22* 23  22*   BUN 22* 37* 44*  42*   CREATININE 2.1* 3.4* 4.0*  4.1*   CALCIUM 9.5 9.2 9.3  8.9   ALBUMIN 2.1* 2.1* 2.0*  2.0*   PROT 7.6  --  7.5   BILITOT 0.4  --  0.3   ALKPHOS 116  --  110   ALT 15  --  18   AST 36  --  29   MG 2.3 2.4 2.4  2.4   PHOS 2.7 3.1 3.4  3.3         Significant Imaging:  I have reviewed all pertinent imaging results/findings within the past 24 hours.  Assessment/Plan:     Renal/  * Ayaz's gangrene    Neuro/Psych:   Acute Encephalopathy  CTH 2/3 with scattered hypoattenuation likely representing age indeterminate infarcts. EEG findings consistent with moderate-severe encephalopathy. MRI 2/6: Several scattered punctate acute infarcts throughout the bilateral frontal lobes, centrum semiovale, basal ganglia, corpus callosum, and cerebellar hemispheres.  CTA 2/6: Atherosclerotic plaquing of the carotid bifurcations and proximal ICAs with less than 50% proximal ICA stenosis by NASCET criteria . Repeat CTH and MRI/MRA unchanged from prior exams. S/p multiple wound vac exchanges. Palliative onboard with family wanting full measures.    Neuro/Psych  Repeat CTH and MRI/MRA stable from initial reads. LP 2/16. Elevated WBCs and protein consistent with bacterial meningitis.  -- Sedation: None  -- Pain: kermit tylenol, dialudid and oxy prn  -- GCS 4T: E2, V1T, M1; exam stable  -- Neurology following; appreciate recs  -- Neurovascular following previously; signed off 2/17  -- Palliative following; GOC conversation 2/7; appreciate recs  -- CSF culture 2/15 with no growth             Cards:   -- HDS  -- goal SBP < 180, MAP >65  -- normotensive; hydralazine and labetalol prns  -- Continue amlodipine 10mg daily; coreg 25mg BID      Pulm:   Acute Respiratory Failure  -- tracheostomy on 2/22.  Tolerating trach collar  -- Goal O2 sat > 90%      Renal:  ALVARADO on CKD  ATN 2/2 septic shock  2/13 LI trialysis  -- Nephrology following; appreciate recs   -- iHD today for metabolic clearance and  volume removal  -- On sevelamer  -- Replace lytes as needed.  -- Continue lasix 120mg IV tid   -- Plan to place a permacath for LTAC dialysis-- once we have a set discharge date from ICU to LTACH, interventional IR will place line; appreciate help    Recent Labs   Lab 02/23/24  0304 02/23/24  2203 02/24/24  0333   BUN 22* 37* 44*  42*   CREATININE 2.1* 3.4* 4.0*  4.1*        FEN / GI:   -- Daily CMPs  -- NGT in place   -- Replace lytes as needed  -- Nutrition: NPO, TF (Peptamen Intense) at goal  -- GI PPX   -- Nutrition following; appreciate recs  -- Continue psyllium and sevelamer      ID:   Ayaz's gangrene  s/p wound vac exchange/ debridement x4 for nec fascitis. R groin tissue with proteus, sensitive to ceftriaxone. Growing bacteroids as well. Urine cx 2/18 growing C. Indologens.  -- Abx: none  --completed zosyn, EOT 2/22   -- ID signed off 2/21     Heme/Onc:  -- Daily CBC  -- Heparin DVT ppx  -- Transfuse if Hgb <7  -- hgb 7.8     Endo:   IDDM  Initially presented with DKA   Placed on insulin gtt 2/3 and dced 2/12.    - q4h BG monitoring while NPO  - Detemir 14 units daily  - Novolog 6units q4h while on TFs  - Moderate dose SSI PRN      PPx:   Feeding: TF at goal  Analgesia/Sedation: See above  Thromboembolic prevention: SQH   HOB >30: Y  Stress Ulcer ppx: Famotidine  Glucose control: Goal 140-180 g/dl, kermit detemir and novolog, SSI, Endo following  Bowel reg: psyllium  Invasive Lines/Drains/Airway: Glynn, trach, LIJ Trialysis, PIV x2      Dispo/Code Status/Palliative:   -- SICU / Full Code   -- Planning transition to LTAC; appreciate social work help         Janie Mccormack MD  Critical Care - Surgery  [facility]

## 2024-02-24 NOTE — PROGRESS NOTES
[facility]  General Surgery  Progress Note    Subjective:     History of Present Illness:  53 year old morbidly obese female who does not routinely go to the doctor presents to the ED with malaise, nausea, vomiting, and groin, buttock, perineal swelling and tenderness. She began feeling ill a few days ago.     On arrival to the ED she is tachycardic to 120, hypertensive without fever. Labs demonstrate leukocytosis of 21, , with lactic acidosis and associated ALVARADO with cr 3.8, hyperglycemia with blood glucose of 824 accompanied by severe electrolyte derangements. CT imaging obtained demonstrates diffuse subcutaneous air along buttocks, perineum, anterior vulva, as well as bilateral thighs.     No prior abdominal surgeries. She denies problems with her heart or lungs. Non smoker. Does not routinely take any medications.    Post-Op Info:  Procedure(s) (LRB):  CREATION, TRACHEOSTOMY (N/A)  EGD, WITH PEG TUBE INSERTION (N/A)  CLOSURE, WOUND (Right)  REPLACEMENT, WOUND VAC (Right)   2 Days Post-Op     Interval History: HDS, afebrile, breathing spontaneously. WV with 100cc output recorded.     Medications:  Continuous Infusions:   dextrose 10 % in water (D10W)       Scheduled Meds:   amLODIPine  10 mg Per G Tube Daily    carvediloL  25 mg Per G Tube BID    furosemide (LASIX) injection  120 mg Intravenous TID    heparin (porcine)  7,500 Units Subcutaneous Q8H    insulin aspart U-100  6 Units Subcutaneous Q4H    insulin detemir U-100  14 Units Subcutaneous Daily    psyllium husk (aspartame)  1 packet Per G Tube Daily    sevelamer carbonate  1.6 g Per G Tube TID     PRN Meds:sodium chloride 0.9%, acetaminophen, albuterol-ipratropium, dextrose 10 % in water (D10W), dextrose 10%, dextrose 10%, glucagon (human recombinant), glucose, glucose, hydrALAZINE, insulin aspart U-100, labetalol, naloxone, ondansetron, oxyCODONE, prochlorperazine, sodium chloride 0.9%, sodium chloride 0.9%, sodium chloride  0.9%     Review of patient's allergies indicates:  No Known Allergies  Objective:     Vital Signs (Most Recent):  Temp: 98.8 °F (37.1 °C) (02/24/24 0715)  Pulse: 90 (02/24/24 0848)  Resp: (!) 29 (02/24/24 0848)  BP: (!) 150/71 (02/24/24 0848)  SpO2: 98 % (02/24/24 0745) Vital Signs (24h Range):  Temp:  [98.8 °F (37.1 °C)-99.8 °F (37.7 °C)] 98.8 °F (37.1 °C)  Pulse:  [87-97] 90  Resp:  [13-37] 29  SpO2:  [97 %-100 %] 98 %  BP: (129-167)/(59-81) 150/71     Weight: (!) 150.9 kg (332 lb 10.8 oz)  Body mass index is 55.36 kg/m².    Intake/Output - Last 3 Shifts         02/22 0700 02/23 0659 02/23 0700 02/24 0659 02/24 0700 02/25 0659    I.V. (mL/kg) 2041.5 (13.5)      Blood       NG/ 1770     IV Piggyback 699      Total Intake(mL/kg) 3240.5 (21.5) 1770 (11.7)     Urine (mL/kg/hr) 1420 (0.4) 75 (0)     Other 2592 100     Stool 200      Total Output 4212 175     Net -971.6 +1595            Stool Occurrence 2 x               Physical Exam  Vitals and nursing note reviewed.   Constitutional:       General: She is not in acute distress.     Appearance: She is ill-appearing.   HENT:      Head: Normocephalic and atraumatic.   Eyes:      Extraocular Movements: Extraocular movements intact.      Conjunctiva/sclera: Conjunctivae normal.   Neck:      Comments: Left IJ Trialysis catheter  Cardiovascular:      Rate and Rhythm: Normal rate and regular rhythm.   Pulmonary:      Effort: Pulmonary effort is normal.      Comments: trached    Abdominal:      General: There is no distension.      Palpations: Abdomen is soft.      Tenderness: There is no abdominal tenderness.      Comments: PEG in place   Genitourinary:     Comments: Glynn in place    Skin:     General: Skin is warm and dry.      Comments: Wound vac in place to R thigh/groin to suction  Re approximated skin clean dry and intact   Neurological:      General: No focal deficit present.      Mental Status: She is oriented to person, place, and time.   Psychiatric:          Mood and Affect: Mood normal.         Behavior: Behavior normal.          Significant Labs:  I have reviewed all pertinent lab results within the past 24 hours.    Significant Diagnostics:  I have reviewed all pertinent imaging results/findings within the past 24 hours.  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 Unfortunately, has multiple infarcts on MRI brain with unchanged neuro status, however, family would like to proceed with all measures. S/p trach, peg, and lower wound closure, replacement of wound vac in right groin on 2/22/24. Doing okay and doing better from a respiratory standpoint.    - Last WV change 2/22  - okay to use PEG tube  - LP 2/16 - NGTD  - ID consulted for urine cultures - recommend broadening to zosyn    - UA growing Burkholderia species and Chryseibacterium Indologenes, recommend IV zosyn with stop date of 2/22  - Palliative following given overall poor prognosis, daughter would like everything done  - Stable on spontaneous with trach   - Okay for DVT ppx   - Remainder of care per SICU    Dispo: will need LTAC placement         Dayo Salazar MD  General Surgery  [facility]

## 2024-02-24 NOTE — SUBJECTIVE & OBJECTIVE
Interval History:     No acute events overnight. Patient awaiting LTACH placement. Scheduled for iHD today. Mentation unchanged.     Review of patient's allergies indicates:  No Known Allergies  Current Facility-Administered Medications   Medication Frequency    0.9%  NaCl infusion PRN    acetaminophen tablet 650 mg Q4H PRN    albuterol-ipratropium 2.5 mg-0.5 mg/3 mL nebulizer solution 3 mL Q4H PRN    amLODIPine tablet 10 mg Daily    carvediloL tablet 25 mg BID    dextrose 10 % infusion Continuous PRN    dextrose 10% bolus 125 mL 125 mL PRN    dextrose 10% bolus 250 mL 250 mL PRN    furosemide injection 120 mg TID    glucagon (human recombinant) injection 1 mg PRN    glucose chewable tablet 16 g PRN    glucose chewable tablet 24 g PRN    heparin (porcine) injection 7,500 Units Q8H    hydrALAZINE injection 10 mg Q4H PRN    insulin aspart U-100 pen 0-10 Units Q4H PRN    insulin aspart U-100 pen 6 Units Q4H    insulin detemir U-100 (Levemir) pen 14 Units Daily    labetalol 20 mg/4 mL (5 mg/mL) IV syring Q6H PRN    naloxone 0.4 mg/mL injection 0.02 mg PRN    ondansetron injection 4 mg Q6H PRN    oxyCODONE 5 mg/5 mL solution 5 mg Q6H PRN    prochlorperazine injection Soln 5 mg Q6H PRN    psyllium husk (aspartame) 3.4 gram packet 1 packet Daily    sevelamer carbonate pwpk 1.6 g TID    sodium chloride 0.9% bolus 250 mL 250 mL PRN    sodium chloride 0.9% bolus 250 mL 250 mL PRN    sodium chloride 0.9% flush 10 mL PRN       Objective:     Vital Signs (Most Recent):  Temp: 98.8 °F (37.1 °C) (02/24/24 0715)  Pulse: 90 (02/24/24 0848)  Resp: (!) 29 (02/24/24 0848)  BP: (!) 150/71 (02/24/24 0848)  SpO2: 98 % (02/24/24 0745) Vital Signs (24h Range):  Temp:  [98.8 °F (37.1 °C)-99.8 °F (37.7 °C)] 98.8 °F (37.1 °C)  Pulse:  [87-97] 90  Resp:  [13-37] 29  SpO2:  [97 %-100 %] 98 %  BP: (129-167)/(59-81) 150/71     Weight: (!) 150.9 kg (332 lb 10.8 oz) (02/21/24 0300)  Body mass index is 55.36 kg/m².  Body surface area is 2.63 meters  squared.    I/O last 3 completed shifts:  In: 4100.8 [I.V.:1791.5; NG/GT:2170; IV Piggyback:139.3]  Out: 3226 [Urine:695; Other:2331; Stool:200]     Physical Exam  Vitals and nursing note reviewed.   Constitutional:       General: She is not in acute distress.     Appearance: She is ill-appearing.   HENT:      Head: Normocephalic and atraumatic.   Eyes:      Extraocular Movements: Extraocular movements intact.      Conjunctiva/sclera: Conjunctivae normal.   Neck:      Comments: Left IJ Trialysis catheter  Cardiovascular:      Rate and Rhythm: Normal rate and regular rhythm.   Pulmonary:      Effort: Pulmonary effort is normal.      Comments: trached    Abdominal:      General: There is no distension.      Palpations: Abdomen is soft.      Tenderness: There is no abdominal tenderness.      Comments: PEG in place   Genitourinary:     Comments: Glynn in place    Skin:     General: Skin is warm and dry.      Comments: Wound vac in place to R thigh/groin to suction  Re approximated skin clean dry and intact   Neurological:      General: No focal deficit present.      Mental Status: She is oriented to person, place, and time.   Psychiatric:         Mood and Affect: Mood normal.         Behavior: Behavior normal.          Significant Labs:  All labs within the past 24 hours have been reviewed.     Significant Imaging:  Labs: Reviewed

## 2024-02-24 NOTE — PROGRESS NOTES
"[facility]  Endocrinology  Progress Note    Admit Date: 1/29/2024     Reason for Consult: Management of T2DM, Hyperglycemia     Surgical Procedure and Date: n/a    Diabetes diagnosis year: new onset     Home Diabetes Medications:  none per chart review and family present at bedside     Lab Results   Component Value Date    HGBA1C 10.4 (H) 01/30/2024       Diabetes Complications include:     Hyperglycemia, DKA at OSH     Complicating diabetes co morbidities:   Obesity       HPI:   Patient is a 53 y.o. female with a diagnosis of obesity (BMI 53) admitted with N/V and R groin wound found to be in DKA at OSH. 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 While at OSH pt developed acute respiratory failure requiring intubation. Pulmonology was consulted for ventilator management and critical illness. Nephrology was consulted for worsening vishal in the setting of lactic acidosis and septic shock likely ATN, sCr elevated at 3.8 on admit now 4.0 post HD. Pt being admitted to SICU for surgical critical care management. Immediate plans include hemodynamic stabilization, weaning of respiratory support, and RRT.  Endocrinology consulted for management of T2DM.                 Interval HPI:   No acute events overnight. Patient in room 37045/57257 A. Blood glucose stable. BG at goal on current insulin regimen (Schedule Insulin and SSI (TPN/TF)). Steroid use- None .      Renal function- Abnormal - Cr  4.0  Vasopressors-  None      Diet NPO      Eating:   NPO  Nausea: No  Hypoglycemia and intervention: No  Fever: No  TPN and/or TF: Yes TF  If yes, type of TF/TPN and rate: 50 cc.hr    BP (!) 150/71   Pulse 90   Temp 98.8 °F (37.1 °C) (Oral)   Resp (!) 29   Ht 5' 5" (1.651 m)   Wt (!) 150.9 kg (332 lb 10.8 oz)   LMP 01/01/2024 (Approximate) Comment: tubal ligation  SpO2 98%   BMI 55.36 kg/m²     Labs Reviewed " "and Include    Recent Labs   Lab 02/24/24  0333   *  155*   CALCIUM 9.3  8.9   ALBUMIN 2.0*  2.0*   PROT 7.5   *  135*   K 4.9  4.8   CO2 23  22*     103   BUN 44*  42*   CREATININE 4.0*  4.1*   ALKPHOS 110   ALT 18   AST 29   BILITOT 0.3     Lab Results   Component Value Date    WBC 11.67 02/24/2024    HGB 7.8 (L) 02/24/2024    HCT 25.5 (L) 02/24/2024    MCV 91 02/24/2024     02/24/2024     No results for input(s): "TSH", "FREET4" in the last 168 hours.  Lab Results   Component Value Date    HGBA1C 10.4 (H) 01/30/2024       Nutritional status:   Body mass index is 55.36 kg/m².  Lab Results   Component Value Date    ALBUMIN 2.0 (L) 02/24/2024    ALBUMIN 2.0 (L) 02/24/2024    ALBUMIN 2.1 (L) 02/23/2024     No results found for: "PREALBUMIN"    Estimated Creatinine Clearance: 24.3 mL/min (A) (based on SCr of 4 mg/dL (H)).    Accu-Checks  Recent Labs     02/22/24  1921 02/22/24  2309 02/23/24  0304 02/23/24  0733 02/23/24  1105 02/23/24  1512 02/23/24  2031 02/23/24  2337 02/24/24  0336 02/24/24  0846   POCTGLUCOSE 143* 127* 146* 142* 144* 166* 180* 166* 166* 171*       Current Medications and/or Treatments Impacting Glycemic Control  Immunotherapy:    Immunosuppressants       None          Steroids:   Hormones (From admission, onward)      None          Pressors:    Autonomic Drugs (From admission, onward)      None          Hyperglycemia/Diabetes Medications:   Antihyperglycemics (From admission, onward)      Start     Stop Route Frequency Ordered    02/13/24 0915  insulin detemir U-100 (Levemir) pen 14 Units         -- SubQ Daily 02/13/24 0906    02/09/24 1200  insulin aspart U-100 pen 6 Units         -- SubQ Every 4 hours 02/09/24 0901    02/03/24 1010  insulin aspart U-100 pen 0-10 Units         -- SubQ Every 4 hours PRN 02/03/24 0911            ASSESSMENT and PLAN    Renal/  * Ayaz's gangrene  Managed per primary team  Optimize BG control        ALVARADO (acute kidney " injury)  Lab Results   Component Value Date    CREATININE 4.0 (H) 02/24/2024    CREATININE 4.1 (H) 02/24/2024     Avoid insulin stacking  Titrate insulin slowly       Endocrine  New onset type 2 diabetes mellitus  BG goal 140-180  No previous hx of T2DM per family at bedside. A1c of 10.4. DKA at OSH. On TF. BG stable on regimen.        Plan:  - Continue Levemir 14 units daily.  - Continue Novolog 6 units q 4 hrs while on tube feeding (HOLD if tube feeding is stopped or BG < 100)   - Continue Moderate Dose Correction Scale  - BG monitoring q 4 hrs while NPO /TF    ** Please call Endocrine for any BG related issues **      Discharge plans: TBD    Lab Results   Component Value Date    HGBA1C 10.4 (H) 01/30/2024             Salvador Alfaro PA-C  Endocrinology  [facility]

## 2024-02-24 NOTE — ASSESSMENT & PLAN NOTE
Per primary team.    normal... Well appearing, awake, alert, oriented to person, place, time/situation and in no apparent distress.

## 2024-02-25 LAB
ALBUMIN SERPL BCP-MCNC: 2.1 G/DL (ref 3.5–5.2)
ALBUMIN SERPL BCP-MCNC: 2.1 G/DL (ref 3.5–5.2)
ALP SERPL-CCNC: 121 U/L (ref 55–135)
ALT SERPL W/O P-5'-P-CCNC: 16 U/L (ref 10–44)
ANION GAP SERPL CALC-SCNC: 11 MMOL/L (ref 8–16)
ANION GAP SERPL CALC-SCNC: 11 MMOL/L (ref 8–16)
AST SERPL-CCNC: 35 U/L (ref 10–40)
BASOPHILS # BLD AUTO: 0.06 K/UL (ref 0–0.2)
BASOPHILS NFR BLD: 0.6 % (ref 0–1.9)
BILIRUB SERPL-MCNC: 0.3 MG/DL (ref 0.1–1)
BUN SERPL-MCNC: 35 MG/DL (ref 6–20)
BUN SERPL-MCNC: 35 MG/DL (ref 6–20)
CALCIUM SERPL-MCNC: 8.8 MG/DL (ref 8.7–10.5)
CALCIUM SERPL-MCNC: 8.9 MG/DL (ref 8.7–10.5)
CHLORIDE SERPL-SCNC: 101 MMOL/L (ref 95–110)
CHLORIDE SERPL-SCNC: 101 MMOL/L (ref 95–110)
CO2 SERPL-SCNC: 24 MMOL/L (ref 23–29)
CO2 SERPL-SCNC: 25 MMOL/L (ref 23–29)
CREAT SERPL-MCNC: 3.3 MG/DL (ref 0.5–1.4)
CREAT SERPL-MCNC: 3.4 MG/DL (ref 0.5–1.4)
DIFFERENTIAL METHOD BLD: ABNORMAL
EOSINOPHIL # BLD AUTO: 0.7 K/UL (ref 0–0.5)
EOSINOPHIL NFR BLD: 6.4 % (ref 0–8)
ERYTHROCYTE [DISTWIDTH] IN BLOOD BY AUTOMATED COUNT: 17.7 % (ref 11.5–14.5)
EST. GFR  (NO RACE VARIABLE): 15.5 ML/MIN/1.73 M^2
EST. GFR  (NO RACE VARIABLE): 16.1 ML/MIN/1.73 M^2
GLUCOSE SERPL-MCNC: 135 MG/DL (ref 70–110)
GLUCOSE SERPL-MCNC: 137 MG/DL (ref 70–110)
HCT VFR BLD AUTO: 25.3 % (ref 37–48.5)
HGB BLD-MCNC: 8 G/DL (ref 12–16)
IMM GRANULOCYTES # BLD AUTO: 0.04 K/UL (ref 0–0.04)
IMM GRANULOCYTES NFR BLD AUTO: 0.4 % (ref 0–0.5)
LYMPHOCYTES # BLD AUTO: 1.8 K/UL (ref 1–4.8)
LYMPHOCYTES NFR BLD: 17.2 % (ref 18–48)
MAGNESIUM SERPL-MCNC: 2.1 MG/DL (ref 1.6–2.6)
MAGNESIUM SERPL-MCNC: 2.1 MG/DL (ref 1.6–2.6)
MCH RBC QN AUTO: 28.3 PG (ref 27–31)
MCHC RBC AUTO-ENTMCNC: 31.6 G/DL (ref 32–36)
MCV RBC AUTO: 89 FL (ref 82–98)
MONOCYTES # BLD AUTO: 1.1 K/UL (ref 0.3–1)
MONOCYTES NFR BLD: 10 % (ref 4–15)
NEUTROPHILS # BLD AUTO: 7 K/UL (ref 1.8–7.7)
NEUTROPHILS NFR BLD: 65.4 % (ref 38–73)
NRBC BLD-RTO: 0 /100 WBC
PHOSPHATE SERPL-MCNC: 2.8 MG/DL (ref 2.7–4.5)
PHOSPHATE SERPL-MCNC: 2.8 MG/DL (ref 2.7–4.5)
PLATELET # BLD AUTO: 417 K/UL (ref 150–450)
PMV BLD AUTO: 10.1 FL (ref 9.2–12.9)
POCT GLUCOSE: 126 MG/DL (ref 70–110)
POCT GLUCOSE: 137 MG/DL (ref 70–110)
POCT GLUCOSE: 139 MG/DL (ref 70–110)
POCT GLUCOSE: 146 MG/DL (ref 70–110)
POCT GLUCOSE: 165 MG/DL (ref 70–110)
POCT GLUCOSE: 192 MG/DL (ref 70–110)
POTASSIUM SERPL-SCNC: 4.3 MMOL/L (ref 3.5–5.1)
POTASSIUM SERPL-SCNC: 4.3 MMOL/L (ref 3.5–5.1)
PROT SERPL-MCNC: 7.7 G/DL (ref 6–8.4)
RBC # BLD AUTO: 2.83 M/UL (ref 4–5.4)
SODIUM SERPL-SCNC: 136 MMOL/L (ref 136–145)
SODIUM SERPL-SCNC: 137 MMOL/L (ref 136–145)
WBC # BLD AUTO: 10.72 K/UL (ref 3.9–12.7)

## 2024-02-25 PROCEDURE — 83735 ASSAY OF MAGNESIUM: CPT | Mod: 91 | Performed by: STUDENT IN AN ORGANIZED HEALTH CARE EDUCATION/TRAINING PROGRAM

## 2024-02-25 PROCEDURE — 63600175 PHARM REV CODE 636 W HCPCS

## 2024-02-25 PROCEDURE — 27000207 HC ISOLATION

## 2024-02-25 PROCEDURE — 85025 COMPLETE CBC W/AUTO DIFF WBC: CPT

## 2024-02-25 PROCEDURE — 20000000 HC ICU ROOM

## 2024-02-25 PROCEDURE — 63600175 PHARM REV CODE 636 W HCPCS: Performed by: HOSPITALIST

## 2024-02-25 PROCEDURE — 25000003 PHARM REV CODE 250: Performed by: STUDENT IN AN ORGANIZED HEALTH CARE EDUCATION/TRAINING PROGRAM

## 2024-02-25 PROCEDURE — 99900026 HC AIRWAY MAINTENANCE (STAT)

## 2024-02-25 PROCEDURE — 99291 CRITICAL CARE FIRST HOUR: CPT | Mod: 24,25,, | Performed by: SURGERY

## 2024-02-25 PROCEDURE — 99900035 HC TECH TIME PER 15 MIN (STAT)

## 2024-02-25 PROCEDURE — 83735 ASSAY OF MAGNESIUM: CPT | Performed by: STUDENT IN AN ORGANIZED HEALTH CARE EDUCATION/TRAINING PROGRAM

## 2024-02-25 PROCEDURE — 80053 COMPREHEN METABOLIC PANEL: CPT

## 2024-02-25 PROCEDURE — 80069 RENAL FUNCTION PANEL: CPT | Performed by: STUDENT IN AN ORGANIZED HEALTH CARE EDUCATION/TRAINING PROGRAM

## 2024-02-25 PROCEDURE — 94761 N-INVAS EAR/PLS OXIMETRY MLT: CPT

## 2024-02-25 PROCEDURE — 27000221 HC OXYGEN, UP TO 24 HOURS

## 2024-02-25 PROCEDURE — 25000242 PHARM REV CODE 250 ALT 637 W/ HCPCS: Performed by: STUDENT IN AN ORGANIZED HEALTH CARE EDUCATION/TRAINING PROGRAM

## 2024-02-25 PROCEDURE — 84100 ASSAY OF PHOSPHORUS: CPT | Performed by: STUDENT IN AN ORGANIZED HEALTH CARE EDUCATION/TRAINING PROGRAM

## 2024-02-25 RX ADMIN — INSULIN ASPART 2 UNITS: 100 INJECTION, SOLUTION INTRAVENOUS; SUBCUTANEOUS at 11:02

## 2024-02-25 RX ADMIN — CARVEDILOL 25 MG: 25 TABLET, FILM COATED ORAL at 09:02

## 2024-02-25 RX ADMIN — CARVEDILOL 25 MG: 25 TABLET, FILM COATED ORAL at 08:02

## 2024-02-25 RX ADMIN — INSULIN ASPART 6 UNITS: 100 INJECTION, SOLUTION INTRAVENOUS; SUBCUTANEOUS at 12:02

## 2024-02-25 RX ADMIN — INSULIN ASPART 6 UNITS: 100 INJECTION, SOLUTION INTRAVENOUS; SUBCUTANEOUS at 08:02

## 2024-02-25 RX ADMIN — AMLODIPINE BESYLATE 10 MG: 10 TABLET ORAL at 08:02

## 2024-02-25 RX ADMIN — SEVELAMER CARBONATE 1.6 G: 800 POWDER, FOR SUSPENSION ORAL at 09:02

## 2024-02-25 RX ADMIN — SEVELAMER CARBONATE 1.6 G: 800 POWDER, FOR SUSPENSION ORAL at 08:02

## 2024-02-25 RX ADMIN — INSULIN ASPART 6 UNITS: 100 INJECTION, SOLUTION INTRAVENOUS; SUBCUTANEOUS at 04:02

## 2024-02-25 RX ADMIN — INSULIN ASPART 6 UNITS: 100 INJECTION, SOLUTION INTRAVENOUS; SUBCUTANEOUS at 03:02

## 2024-02-25 RX ADMIN — INSULIN ASPART 2 UNITS: 100 INJECTION, SOLUTION INTRAVENOUS; SUBCUTANEOUS at 07:02

## 2024-02-25 RX ADMIN — FUROSEMIDE 120 MG: 10 INJECTION, SOLUTION INTRAVENOUS at 09:02

## 2024-02-25 RX ADMIN — PSYLLIUM HUSK 1 PACKET: 3.4 POWDER ORAL at 08:02

## 2024-02-25 RX ADMIN — FUROSEMIDE 120 MG: 10 INJECTION, SOLUTION INTRAVENOUS at 08:02

## 2024-02-25 RX ADMIN — HYDRALAZINE HYDROCHLORIDE 10 MG: 20 INJECTION, SOLUTION INTRAMUSCULAR; INTRAVENOUS at 06:02

## 2024-02-25 RX ADMIN — HEPARIN SODIUM 7500 UNITS: 5000 INJECTION INTRAVENOUS; SUBCUTANEOUS at 09:02

## 2024-02-25 RX ADMIN — INSULIN ASPART 6 UNITS: 100 INJECTION, SOLUTION INTRAVENOUS; SUBCUTANEOUS at 11:02

## 2024-02-25 RX ADMIN — FUROSEMIDE 120 MG: 10 INJECTION, SOLUTION INTRAVENOUS at 02:02

## 2024-02-25 RX ADMIN — HEPARIN SODIUM 7500 UNITS: 5000 INJECTION INTRAVENOUS; SUBCUTANEOUS at 06:02

## 2024-02-25 RX ADMIN — INSULIN ASPART 2 UNITS: 100 INJECTION, SOLUTION INTRAVENOUS; SUBCUTANEOUS at 04:02

## 2024-02-25 RX ADMIN — INSULIN DETEMIR 14 UNITS: 100 INJECTION, SOLUTION SUBCUTANEOUS at 09:02

## 2024-02-25 RX ADMIN — HEPARIN SODIUM 7500 UNITS: 5000 INJECTION INTRAVENOUS; SUBCUTANEOUS at 02:02

## 2024-02-25 RX ADMIN — SEVELAMER CARBONATE 1.6 G: 800 POWDER, FOR SUSPENSION ORAL at 02:02

## 2024-02-25 RX ADMIN — INSULIN ASPART 6 UNITS: 100 INJECTION, SOLUTION INTRAVENOUS; SUBCUTANEOUS at 07:02

## 2024-02-25 NOTE — CARE UPDATE
-Glucose Goal 140-180    -A1C:   Hemoglobin A1C   Date Value Ref Range Status   01/30/2024 10.4 (H) 4.0 - 5.6 % Final     Comment:     ADA Screening Guidelines:  5.7-6.4%  Consistent with prediabetes  >or=6.5%  Consistent with diabetes    High levels of fetal hemoglobin interfere with the HbA1C  assay. Heterozygous hemoglobin variants (HbS, HgC, etc)do  not significantly interfere with this assay.   However, presence of multiple variants may affect accuracy.           -HOME REGIMEN:     -GLUCOSE TREND FOR THE PAST 24HRS:   Recent Labs   Lab 02/24/24  0846 02/24/24  1219 02/24/24  1816 02/24/24  1921 02/25/24  0007 02/25/24  0323   POCTGLUCOSE 171* 176* 139* 137* 126* 139*         -NO HYPOGYCEMIAS NOTED     - Diet  Diet NPO    BG goal 140-180  No previous hx of T2DM per family at bedside. A1c of 10.4. DKA at OSH. On TF. BG stable on regimen.          Plan:  - Continue Levemir 14 units daily.  - Continue Novolog 6 units q 4 hrs while on tube feeding (HOLD if tube feeding is stopped or BG < 100)   - Continue Moderate Dose Correction Scale  - BG monitoring q 4 hrs while NPO /TF     ** Please call Endocrine for any BG related issues **

## 2024-02-25 NOTE — PROGRESS NOTES
[facility]  Critical Care - Surgery  Progress Note    Patient Name: Sarah Saravia  MRN: [id number]  Admission Date: 1/29/2024  Hospital Length of Stay: 27 days  Code Status: Full Code  Attending Provider: Steve Cole MD  [facility]   Principal Problem: Ayaz's gangrene    Subjective:     Interval History/Significant Events: VSS.  NAEON. POD 3 s/p tracheostomy, PEG and wound vac replacement. Continues on trach collar 5 + 28      Objective:     Vital Signs (Most Recent):  Temp: 98.7 °F (37.1 °C) (02/24/24 1915)  Pulse: 91 (02/25/24 0445)  Resp: (!) 25 (02/25/24 0445)  BP: 136/67 (02/25/24 0445)  SpO2: 98 % (02/25/24 0445) Vital Signs (24h Range):  Temp:  [98.7 °F (37.1 °C)-98.8 °F (37.1 °C)] 98.7 °F (37.1 °C)  Pulse:  [] 91  Resp:  [20-37] 25  SpO2:  [95 %-100 %] 98 %  BP: (119-165)/(57-77) 136/67     Weight: (!) 150.9 kg (332 lb 10.8 oz)  Body mass index is 55.36 kg/m².      Intake/Output Summary (Last 24 hours) at 2/25/2024 0501  Last data filed at 2/25/2024 0100  Gross per 24 hour   Intake 1300 ml   Output 1650 ml   Net -350 ml            Physical Exam     Vitals and nursing note reviewed.   Constitutional:       General: She is not in acute distress.     Appearance: She is ill-appearing.   HENT:      Head: Normocephalic and atraumatic.   Eyes:      Extraocular Movements: Extraocular movements intact.      Conjunctiva/sclera: Conjunctivae normal.   Neck:      Comments: Left IJ Trialysis catheter  Cardiovascular:      Rate and Rhythm: Normal rate and regular rhythm.   Pulmonary:      Effort: Pulmonary effort is normal.      Comments: tracheostomy  Abdominal:      General: There is no distension.      Palpations: Abdomen is soft.      Tenderness: There is no abdominal tenderness.   Genitourinary:     Comments: fecal incont manager  Skin:     General: Skin is warm and dry.      Comments: Wound vac in place to groin to suction    Neurological:      General: No focal deficit present.      Mental Status: She is oriented to person, place, and time.   Psychiatric:         Mood and Affect: Mood normal.         Behavior: Behavior normal.       Lines/Drains/Airways       Central Venous Catheter Line  Duration             Trialysis (Dialysis) Catheter 02/11/24 2303 left internal jugular 13 days              Drain  Duration                  Fecal Incontinence  02/22/24 0900 2 days         Gastrostomy/Enterostomy 02/22/24 1200 LUQ 2 days         Open Drain 02/22/24 1414 Tube - 1 Right Thigh Penrose Other (see comments) 2 days              Airway  Duration             Adult Surgical Airway 02/22/24 Shiley Cuffed 8.0 / 85 mm 3 days              Peripheral Intravenous Line  Duration                  Peripheral IV - Single Lumen 02/22/24 0015 22 G Left;Posterior Forearm 3 days         Peripheral IV - Single Lumen 02/22/24 0020 20 G Posterior;Right Forearm 3 days                    Significant Labs:    CBC/Anemia Profile:  Recent Labs   Lab 02/24/24  0333 02/25/24  0320   WBC 11.67 10.72   HGB 7.8* 8.0*   HCT 25.5* 25.3*    417   MCV 91 89   RDW 17.7* 17.7*          Chemistries:  Recent Labs   Lab 02/24/24  0333 02/24/24  1421 02/24/24  2141 02/25/24  0320   *  135* 137 136 137  136   K 4.9  4.8 4.2 4.0 4.3  4.3     103 103 101 101  101   CO2 23  22* 25 25 25  24   BUN 44*  42* 40* 30* 35*  35*   CREATININE 4.0*  4.1* 3.4* 2.9* 3.4*  3.3*   CALCIUM 9.3  8.9 8.8 8.7 8.9  8.8   ALBUMIN 2.0*  2.0* 2.0* 2.1* 2.1*  2.1*   PROT 7.5  --   --  7.7   BILITOT 0.3  --   --  0.3   ALKPHOS 110  --   --  121   ALT 18  --   --  16   AST 29  --   --  35   MG 2.4  2.4 2.2 2.1 2.1  2.1   PHOS 3.4  3.3 2.8 2.4* 2.8  2.8         Significant Imaging:  I have reviewed all pertinent imaging results/findings within the past 24 hours.  Assessment/Plan:     Renal/  * Ayaz's gangrene    Neuro/Psych:   Acute Encephalopathy  CTH  2/3 with scattered hypoattenuation likely representing age indeterminate infarcts. EEG findings consistent with moderate-severe encephalopathy. MRI 2/6: Several scattered punctate acute infarcts throughout the bilateral frontal lobes, centrum semiovale, basal ganglia, corpus callosum, and cerebellar hemispheres.  CTA 2/6: Atherosclerotic plaquing of the carotid bifurcations and proximal ICAs with less than 50% proximal ICA stenosis by NASCET criteria . Repeat CTH and MRI/MRA unchanged from prior exams. S/p multiple wound vac exchanges. Palliative onboard with family wanting full measures.    Neuro/Psych  Repeat CTH and MRI/MRA stable from initial reads. LP 2/16. Elevated WBCs and protein consistent with bacterial meningitis.  -- Sedation: None  -- Pain: kermit tylenol, dialudid and oxy prn  -- GCS 4T: E2, V1T, M1; exam stable  -- Neurology following; appreciate recs  -- Neurovascular following previously; signed off 2/17  -- Palliative following; GOC conversation 2/7; appreciate recs  -- CSF culture 2/15 with no growth             Cards:   -- HDS  -- goal SBP < 180, MAP >65  -- normotensive; hydralazine and labetalol prns  -- Continue amlodipine 10mg daily; coreg 25mg BID      Pulm:   Acute Respiratory Failure  -- tracheostomy on 2/22.  Tolerating trach collar  -- Goal O2 sat > 90%      Renal:  ALVARADO on CKD  ATN 2/2 septic shock  2/13 [facility] trialysis  -- Nephrology following; appreciate recs   -- iHD yesterday 2/24  -- On sevelamer  -- Replace lytes as needed.  -- Continue lasix 120mg IV tid   -- Plan to place a permacath for LTAC dialysis-- once we have a set discharge date from ICU to LTACH, interventional IR will place line; appreciate help     Recent Labs   Lab 02/24/24  1421 02/24/24  2141 02/25/24  0320   BUN 40* 30* 35*  35*   CREATININE 3.4* 2.9* 3.4*  3.3*        FEN / GI:   -- Daily CMPs  -- NGT in place   -- Replace lytes as needed  -- Nutrition: NPO, TF (Peptamen Intense) at goal  -- GI PPX   -- Nutrition  following; appreciate recs  -- Continue psyllium and sevelamer      ID:   Ayaz's gangrene  s/p wound vac exchange/ debridement x4 for nec fascitis. R groin tissue with proteus, sensitive to ceftriaxone. Growing bacteroids as well. Urine cx 2/18 growing C. Indologens.  -- Wound vac change tomorrow 2/26-- will hold TF for OR  -- Abx: none  --completed zosyn, EOT 2/22   -- ID signed off 2/21     Heme/Onc:  -- CBC daily  -- Heparin DVT ppx  -- Transfuse if Hgb <7  -- hgb 8.0     Endo:   IDDM  Initially presented with DKA   Placed on insulin gtt 2/3 and dced 2/12.    - q4h BG monitoring while NPO  - Detemir 14 units daily  - Novolog 6units q4h while on TFs  - Moderate dose SSI PRN      PPx:   Feeding: TF at goal  Analgesia/Sedation: See above  Thromboembolic prevention: SQH   HOB >30: Y  Stress Ulcer ppx: Famotidine  Glucose control: Goal 140-180 g/dl, kermit detemir and novolog, SSI, Endo following  Bowel reg: psyllium  Invasive Lines/Drains/Airway: trach, LIJ Trialysis, PIV x2      Dispo/Code Status/Palliative:   -- SICU / Full Code   -- Planning transition to LTAC; appreciate social work help         Janie Mccormack MD  Critical Care - Surgery  [facility]

## 2024-02-25 NOTE — PROGRESS NOTES
[facility]  General Surgery  Progress Note    Subjective:     History of Present Illness:  53 year old morbidly obese female who does not routinely go to the doctor presents to the ED with malaise, nausea, vomiting, and groin, buttock, perineal swelling and tenderness. She began feeling ill a few days ago.     On arrival to the ED she is tachycardic to 120, hypertensive without fever. Labs demonstrate leukocytosis of 21, , with lactic acidosis and associated ALVARADO with cr 3.8, hyperglycemia with blood glucose of 824 accompanied by severe electrolyte derangements. CT imaging obtained demonstrates diffuse subcutaneous air along buttocks, perineum, anterior vulva, as well as bilateral thighs.     No prior abdominal surgeries. She denies problems with her heart or lungs. Non smoker. Does not routinely take any medications.    Post-Op Info:  Procedure(s) (LRB):  CREATION, TRACHEOSTOMY (N/A)  EGD, WITH PEG TUBE INSERTION (N/A)  CLOSURE, WOUND (Right)  REPLACEMENT, WOUND VAC (Right)   3 Days Post-Op     Interval History: NAEON.  HDS.  On trach collar today.  Received dialysis yesterday.    Medications:  Continuous Infusions:   dextrose 10 % in water (D10W)       Scheduled Meds:   amLODIPine  10 mg Per G Tube Daily    carvediloL  25 mg Per G Tube BID    furosemide (LASIX) injection  120 mg Intravenous TID    heparin (porcine)  7,500 Units Subcutaneous Q8H    insulin aspart U-100  6 Units Subcutaneous Q4H    insulin detemir U-100  14 Units Subcutaneous Daily    psyllium husk (aspartame)  1 packet Per G Tube Daily    sevelamer carbonate  1.6 g Per G Tube TID     PRN Meds:sodium chloride 0.9%, acetaminophen, albuterol-ipratropium, dextrose 10 % in water (D10W), dextrose 10%, dextrose 10%, glucagon (human recombinant), glucose, glucose, hydrALAZINE, insulin aspart U-100, labetalol, naloxone, ondansetron, oxyCODONE, prochlorperazine, sodium chloride 0.9%, sodium chloride 0.9%, sodium chloride 0.9%      Review of patient's allergies indicates:  No Known Allergies  Objective:     Vital Signs (Most Recent):  Temp: (P) 99 °F (37.2 °C) (02/25/24 0700)  Pulse: 87 (02/25/24 0946)  Resp: (!) 31 (02/25/24 0946)  BP: 137/67 (02/25/24 0946)  SpO2: 98 % (02/25/24 0946) Vital Signs (24h Range):  Temp:  [98.7 °F (37.1 °C)-99 °F (37.2 °C)] (P) 99 °F (37.2 °C)  Pulse:  [] 87  Resp:  [20-36] 31  SpO2:  [95 %-100 %] 98 %  BP: (118-165)/(57-77) 137/67     Weight: (!) 150.9 kg (332 lb 10.8 oz)  Body mass index is 55.36 kg/m².    Intake/Output - Last 3 Shifts         02/23 0700 02/24 0659 02/24 0700 02/25 0659 02/25 0700 02/26 0659    I.V. (mL/kg)       Other  100     NG/GT 1770 1450 350    IV Piggyback       Total Intake(mL/kg) 1770 (11.7) 1550 (10.3) 350 (2.3)    Urine (mL/kg/hr) 75 (0)      Other 100 1675     Stool       Total Output 175 1675     Net +1595 -125 +350                   Physical Exam  Vitals and nursing note reviewed.   Constitutional:       General: She is not in acute distress.     Appearance: She is ill-appearing.   HENT:      Head: Normocephalic       Conjunctiva/sclera: Conjunctivae normal.   Neck:      Comments: Left IJ Trialysis catheter  Cardiovascular:      Rate and Rhythm: Normal rate and regular rhythm.   Pulmonary:      Effort: Pulmonary effort is normal.      Comments: trached    Abdominal:      General: There is no distension.      Palpations: Abdomen is soft.      Tenderness: There is no abdominal tenderness.      Comments: PEG in place   Genitourinary:     Comments: Glynn in place    Skin:     General: Skin is warm and dry.      Comments: Wound vac in place to R thigh/groin to suction  Re approximated skin clean dry and intact        Significant Labs:  I have reviewed all pertinent lab results within the past 24 hours.    Significant Diagnostics:  I have reviewed all pertinent imaging results/findings within the past 24 hours.  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 Unfortunately, has multiple infarcts on MRI brain with unchanged neuro status, however, family would like to proceed with all measures. S/p trach, peg, and lower wound closure, replacement of wound vac in right groin on 2/22/24. Doing okay and doing better from a respiratory standpoint.    - Last W[place] change 2/22, [place] change tomorrow 2/26  - okay to use PEG tube  - LP 2/16 - NGTD  - ID consulted for urine cultures - recommend broadening to zosyn    - UA growing Burkholderia species and Chryseibacterium Indologenes, recommend IV zosyn with stop date of 2/22  - Palliative following given overall poor prognosis, daughter would like everything done  - Stable on trach collar  - Okay for DVT ppx   - Remainder of care per SICU    Dispo: will need LTAC placement         Enzo Bagley MD  General Surgery  [facility]

## 2024-02-25 NOTE — ASSESSMENT & PLAN NOTE
Neuro/Psych:   Acute Encephalopathy  CTH 2/3 with scattered hypoattenuation likely representing age indeterminate infarcts. EEG findings consistent with moderate-severe encephalopathy. MRI 2/6: Several scattered punctate acute infarcts throughout the bilateral frontal lobes, centrum semiovale, basal ganglia, corpus callosum, and cerebellar hemispheres.  CTA 2/6: Atherosclerotic plaquing of the carotid bifurcations and proximal ICAs with less than 50% proximal ICA stenosis by NASCET criteria . Repeat CTH and MRI/MRA unchanged from prior exams. S/p multiple wound vac exchanges. Palliative onboard with family wanting full measures.    Neuro/Psych  Repeat CTH and MRI/MRA stable from initial reads. LP 2/16. Elevated WBCs and protein consistent with bacterial meningitis.  -- Sedation: None  -- Pain: kermit tylenol, dialudid and oxy prn  -- GCS 4T: E2, V1T, M1; exam stable  -- Neurology following; appreciate recs  -- Neurovascular following previously; signed off 2/17  -- Palliative following; GOC conversation 2/7; appreciate recs  -- CSF culture 2/15 with no growth             Cards:   -- HDS  -- goal SBP < 180, MAP >65  -- normotensive; hydralazine and labetalol prns  -- Continue amlodipine 10mg daily; coreg 25mg BID      Pulm:   Acute Respiratory Failure  -- tracheostomy on 2/22.  Tolerating trach collar  -- Goal O2 sat > 90%      Renal:  ALVARADO on CKD  ATN 2/2 septic shock  2/13 [facility] trialysis  -- Nephrology following; appreciate recs   -- iHD yesterday 2/24  -- On sevelamer  -- Replace lytes as needed.  -- Continue lasix 120mg IV tid   -- Plan to place a permacath for LTAC dialysis-- once we have a set discharge date from ICU to LTACH, interventional IR will place line; appreciate help     Recent Labs   Lab 02/24/24  1421 02/24/24  2141 02/25/24  0320   BUN 40* 30* 35*  35*   CREATININE 3.4* 2.9* 3.4*  3.3*        FEN / GI:   -- Daily CMPs  -- NGT in place   -- Replace lytes as needed  -- Nutrition: NPO, TF (Peptamen  Intense) at goal  -- GI PPX   -- Nutrition following; appreciate recs  -- Continue psyllium and sevelamer      ID:   Ayaz's gangrene  s/p wound vac exchange/ debridement x4 for nec fascitis. R groin tissue with proteus, sensitive to ceftriaxone. Growing bacteroids as well. Urine cx 2/18 growing C. Indologens.  -- Abx: none  --completed zosyn, EOT 2/22   -- ID signed off 2/21     Heme/Onc:  -- CBC daily  -- Heparin DVT ppx  -- Transfuse if Hgb <7  -- hgb 8.0     Endo:   IDDM  Initially presented with DKA   Placed on insulin gtt 2/3 and dced 2/12.    - q4h BG monitoring while NPO  - Detemir 14 units daily  - Novolog 6units q4h while on TFs  - Moderate dose SSI PRN      PPx:   Feeding: TF at goal  Analgesia/Sedation: See above  Thromboembolic prevention: SQH   HOB >30: Y  Stress Ulcer ppx: Famotidine  Glucose control: Goal 140-180 g/dl, kermit detemir and novolog, SSI, Endo following  Bowel reg: psyllium  Invasive Lines/Drains/Airway: trach, [facility] Trialysis, PIV x2      Dispo/Code Status/Palliative:   -- SICU / Full Code   -- Planning transition to LTAC; appreciate social work help

## 2024-02-25 NOTE — SUBJECTIVE & OBJECTIVE
Interval History/Significant Events: BISHNU MULLER. POD 3 s/p tracheostomy, PEG and wound vac replacement. Continues on trach collar 5 + 28      Objective:     Vital Signs (Most Recent):  Temp: 98.7 °F (37.1 °C) (02/24/24 1915)  Pulse: 91 (02/25/24 0445)  Resp: (!) 25 (02/25/24 0445)  BP: 136/67 (02/25/24 0445)  SpO2: 98 % (02/25/24 0445) Vital Signs (24h Range):  Temp:  [98.7 °F (37.1 °C)-98.8 °F (37.1 °C)] 98.7 °F (37.1 °C)  Pulse:  [] 91  Resp:  [20-37] 25  SpO2:  [95 %-100 %] 98 %  BP: (119-165)/(57-77) 136/67     Weight: (!) 150.9 kg (332 lb 10.8 oz)  Body mass index is 55.36 kg/m².      Intake/Output Summary (Last 24 hours) at 2/25/2024 0501  Last data filed at 2/25/2024 0100  Gross per 24 hour   Intake 1300 ml   Output 1650 ml   Net -350 ml            Physical Exam     Vitals and nursing note reviewed.   Constitutional:       General: She is not in acute distress.     Appearance: She is ill-appearing.   HENT:      Head: Normocephalic and atraumatic.   Eyes:      Extraocular Movements: Extraocular movements intact.      Conjunctiva/sclera: Conjunctivae normal.   Neck:      Comments: Left IJ Trialysis catheter  Cardiovascular:      Rate and Rhythm: Normal rate and regular rhythm.   Pulmonary:      Effort: Pulmonary effort is normal.      Comments: tracheostomy  Abdominal:      General: There is no distension.      Palpations: Abdomen is soft.      Tenderness: There is no abdominal tenderness.   Genitourinary:     Comments: fecal incont manager  Skin:     General: Skin is warm and dry.      Comments: Wound vac in place to groin to suction   Neurological:      General: No focal deficit present.      Mental Status: She is oriented to person, place, and time.   Psychiatric:         Mood and Affect: Mood normal.         Behavior: Behavior normal.       Lines/Drains/Airways       Central Venous Catheter Line  Duration             Trialysis (Dialysis) Catheter 02/11/24 2303 left internal jugular 13 days               Drain  Duration                  Fecal Incontinence  02/22/24 0900 2 days         Gastrostomy/Enterostomy 02/22/24 1200 LUQ 2 days         Open Drain 02/22/24 1414 Tube - 1 Right Thigh Penrose Other (see comments) 2 days              Airway  Duration             Adult Surgical Airway 02/22/24 Shiley Cuffed 8.0 / 85 mm 3 days              Peripheral Intravenous Line  Duration                  Peripheral IV - Single Lumen 02/22/24 0015 22 G Left;Posterior Forearm 3 days         Peripheral IV - Single Lumen 02/22/24 0020 20 G Posterior;Right Forearm 3 days                    Significant Labs:    CBC/Anemia Profile:  Recent Labs   Lab 02/24/24  0333 02/25/24  0320   WBC 11.67 10.72   HGB 7.8* 8.0*   HCT 25.5* 25.3*    417   MCV 91 89   RDW 17.7* 17.7*          Chemistries:  Recent Labs   Lab 02/24/24  0333 02/24/24  1421 02/24/24  2141 02/25/24  0320   *  135* 137 136 137  136   K 4.9  4.8 4.2 4.0 4.3  4.3     103 103 101 101  101   CO2 23  22* 25 25 25  24   BUN 44*  42* 40* 30* 35*  35*   CREATININE 4.0*  4.1* 3.4* 2.9* 3.4*  3.3*   CALCIUM 9.3  8.9 8.8 8.7 8.9  8.8   ALBUMIN 2.0*  2.0* 2.0* 2.1* 2.1*  2.1*   PROT 7.5  --   --  7.7   BILITOT 0.3  --   --  0.3   ALKPHOS 110  --   --  121   ALT 18  --   --  16   AST 29  --   --  35   MG 2.4  2.4 2.2 2.1 2.1  2.1   PHOS 3.4  3.3 2.8 2.4* 2.8  2.8         Significant Imaging:  I have reviewed all pertinent imaging results/findings within the past 24 hours.

## 2024-02-25 NOTE — SUBJECTIVE & OBJECTIVE
Interval History: NAEON.  HDS.  On trach collar today.  Received dialysis yesterday.    Medications:  Continuous Infusions:   dextrose 10 % in water (D10W)       Scheduled Meds:   amLODIPine  10 mg Per G Tube Daily    carvediloL  25 mg Per G Tube BID    furosemide (LASIX) injection  120 mg Intravenous TID    heparin (porcine)  7,500 Units Subcutaneous Q8H    insulin aspart U-100  6 Units Subcutaneous Q4H    insulin detemir U-100  14 Units Subcutaneous Daily    psyllium husk (aspartame)  1 packet Per G Tube Daily    sevelamer carbonate  1.6 g Per G Tube TID     PRN Meds:sodium chloride 0.9%, acetaminophen, albuterol-ipratropium, dextrose 10 % in water (D10W), dextrose 10%, dextrose 10%, glucagon (human recombinant), glucose, glucose, hydrALAZINE, insulin aspart U-100, labetalol, naloxone, ondansetron, oxyCODONE, prochlorperazine, sodium chloride 0.9%, sodium chloride 0.9%, sodium chloride 0.9%     Review of patient's allergies indicates:  No Known Allergies  Objective:     Vital Signs (Most Recent):  Temp: (P) 99 °F (37.2 °C) (02/25/24 0700)  Pulse: 87 (02/25/24 0946)  Resp: (!) 31 (02/25/24 0946)  BP: 137/67 (02/25/24 0946)  SpO2: 98 % (02/25/24 0946) Vital Signs (24h Range):  Temp:  [98.7 °F (37.1 °C)-99 °F (37.2 °C)] (P) 99 °F (37.2 °C)  Pulse:  [] 87  Resp:  [20-36] 31  SpO2:  [95 %-100 %] 98 %  BP: (118-165)/(57-77) 137/67     Weight: (!) 150.9 kg (332 lb 10.8 oz)  Body mass index is 55.36 kg/m².    Intake/Output - Last 3 Shifts         02/23 0700 02/24 0659 02/24 0700 02/25 0659 02/25 0700 02/26 0659    I.V. (mL/kg)       Other  100     NG/GT 1770 1450 350    IV Piggyback       Total Intake(mL/kg) 1770 (11.7) 1550 (10.3) 350 (2.3)    Urine (mL/kg/hr) 75 (0)      Other 100 1675     Stool       Total Output 175 1675     Net +1595 -125 +350                    Physical Exam  Vitals and nursing note reviewed.   Constitutional:       General: She is not in acute distress.     Appearance: She is  ill-appearing.   HENT:      Head: Normocephalic and atraumatic.   Eyes:      Extraocular Movements: Extraocular movements intact.      Conjunctiva/sclera: Conjunctivae normal.   Neck:      Comments: Left IJ Trialysis catheter  Cardiovascular:      Rate and Rhythm: Normal rate and regular rhythm.   Pulmonary:      Effort: Pulmonary effort is normal.      Comments: trached    Abdominal:      General: There is no distension.      Palpations: Abdomen is soft.      Tenderness: There is no abdominal tenderness.      Comments: PEG in place   Genitourinary:     Comments: Glynn in place    Skin:     General: Skin is warm and dry.      Comments: Wound vac in place to R thigh/groin to suction  Re approximated skin clean dry and intact   Neurological:      General: No focal deficit present.      Mental Status: She is oriented to person, place, and time.   Psychiatric:         Mood and Affect: Mood normal.         Behavior: Behavior normal.          Significant Labs:  I have reviewed all pertinent lab results within the past 24 hours.    Significant Diagnostics:  I have reviewed all pertinent imaging results/findings within the past 24 hours.

## 2024-02-25 NOTE — ASSESSMENT & PLAN NOTE
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 Unfortunately, has multiple infarcts on MRI brain with unchanged neuro status, however, family would like to proceed with all measures. S/p trach, peg, and lower wound closure, replacement of wound vac in right groin on 2/22/24. Doing okay and doing better from a respiratory standpoint.    - Last W[place] change 2/22, [place] change tomorrow 2/26  - okay to use PEG tube  - LP 2/16 - NGTD  - ID consulted for urine cultures - recommend broadening to zosyn    - UA growing Burkholderia species and Chryseibacterium Indologenes, recommend IV zosyn with stop date of 2/22  - Palliative following given overall poor prognosis, daughter would like everything done  - Stable on trach collar  - Okay for DVT ppx   - Remainder of care per SICU    Dispo: will need LTAC placement

## 2024-02-25 NOTE — CLINICAL REVIEW
Nephrology Chart Review      Intake/Output Summary (Last 24 hours) at 2/25/2024 1006  Last data filed at 2/25/2024 0900  Gross per 24 hour   Intake 1500 ml   Output 1675 ml   Net -175 ml       Vitals:    02/25/24 0915 02/25/24 0930 02/25/24 0945 02/25/24 0946   BP: 131/68 128/68 137/67 137/67   Pulse: 88 87 87 87   Resp: (!) 32 (!) 24 (!) 29 (!) 31   Temp:       TempSrc:       SpO2: 97% 97% 98% 98%   Weight:       Height:           Recent Labs   Lab 02/24/24  1421 02/24/24  2141 02/25/24  0320    136 137  136   K 4.2 4.0 4.3  4.3    101 101  101   CO2 25 25 25  24   BUN 40* 30* 35*  35*   CREATININE 3.4* 2.9* 3.4*  3.3*   CALCIUM 8.8 8.7 8.9  8.8   PHOS 2.8 2.4* 2.8  2.8       S/p iHD yesterday, tolerated well.   No urgent need for further RRT today.    No other related issues identified. Please call as needed; We will continue to follow.    Antione Hazel

## 2024-02-26 PROBLEM — Z99.11 WEANING FROM MECHANICALLY ASSISTED VENTILATION INITIATED: Status: RESOLVED | Noted: 2024-02-02 | Resolved: 2024-02-26

## 2024-02-26 LAB
ALBUMIN SERPL BCP-MCNC: 2.2 G/DL (ref 3.5–5.2)
ALBUMIN SERPL BCP-MCNC: 2.2 G/DL (ref 3.5–5.2)
ALP SERPL-CCNC: 151 U/L (ref 55–135)
ALT SERPL W/O P-5'-P-CCNC: 27 U/L (ref 10–44)
ANION GAP SERPL CALC-SCNC: 14 MMOL/L (ref 8–16)
ANION GAP SERPL CALC-SCNC: 9 MMOL/L (ref 8–16)
AST SERPL-CCNC: 50 U/L (ref 10–40)
BASOPHILS # BLD AUTO: 0.06 K/UL (ref 0–0.2)
BASOPHILS NFR BLD: 0.6 % (ref 0–1.9)
BILIRUB SERPL-MCNC: 0.2 MG/DL (ref 0.1–1)
BUN SERPL-MCNC: 53 MG/DL (ref 6–20)
BUN SERPL-MCNC: 57 MG/DL (ref 6–20)
CALCIUM SERPL-MCNC: 8.8 MG/DL (ref 8.7–10.5)
CALCIUM SERPL-MCNC: 8.9 MG/DL (ref 8.7–10.5)
CHLORIDE SERPL-SCNC: 100 MMOL/L (ref 95–110)
CHLORIDE SERPL-SCNC: 99 MMOL/L (ref 95–110)
CO2 SERPL-SCNC: 23 MMOL/L (ref 23–29)
CO2 SERPL-SCNC: 25 MMOL/L (ref 23–29)
CREAT SERPL-MCNC: 4.4 MG/DL (ref 0.5–1.4)
CREAT SERPL-MCNC: 4.4 MG/DL (ref 0.5–1.4)
DIFFERENTIAL METHOD BLD: ABNORMAL
EOSINOPHIL # BLD AUTO: 0.7 K/UL (ref 0–0.5)
EOSINOPHIL NFR BLD: 6.5 % (ref 0–8)
ERYTHROCYTE [DISTWIDTH] IN BLOOD BY AUTOMATED COUNT: 17.4 % (ref 11.5–14.5)
EST. GFR  (NO RACE VARIABLE): 11.4 ML/MIN/1.73 M^2
EST. GFR  (NO RACE VARIABLE): 11.4 ML/MIN/1.73 M^2
GLUCOSE SERPL-MCNC: 153 MG/DL (ref 70–110)
GLUCOSE SERPL-MCNC: 154 MG/DL (ref 70–110)
HCT VFR BLD AUTO: 25 % (ref 37–48.5)
HGB BLD-MCNC: 7.9 G/DL (ref 12–16)
IMM GRANULOCYTES # BLD AUTO: 0.07 K/UL (ref 0–0.04)
IMM GRANULOCYTES NFR BLD AUTO: 0.7 % (ref 0–0.5)
LYMPHOCYTES # BLD AUTO: 1.9 K/UL (ref 1–4.8)
LYMPHOCYTES NFR BLD: 17.6 % (ref 18–48)
MAGNESIUM SERPL-MCNC: 2.2 MG/DL (ref 1.6–2.6)
MAGNESIUM SERPL-MCNC: 2.3 MG/DL (ref 1.6–2.6)
MCH RBC QN AUTO: 27.8 PG (ref 27–31)
MCHC RBC AUTO-ENTMCNC: 31.6 G/DL (ref 32–36)
MCV RBC AUTO: 88 FL (ref 82–98)
MONOCYTES # BLD AUTO: 1.2 K/UL (ref 0.3–1)
MONOCYTES NFR BLD: 11.1 % (ref 4–15)
NEUTROPHILS # BLD AUTO: 6.8 K/UL (ref 1.8–7.7)
NEUTROPHILS NFR BLD: 63.5 % (ref 38–73)
NRBC BLD-RTO: 0 /100 WBC
PHOSPHATE SERPL-MCNC: 3.5 MG/DL (ref 2.7–4.5)
PHOSPHATE SERPL-MCNC: 3.7 MG/DL (ref 2.7–4.5)
PLATELET # BLD AUTO: 494 K/UL (ref 150–450)
PMV BLD AUTO: 9.7 FL (ref 9.2–12.9)
POCT GLUCOSE: 138 MG/DL (ref 70–110)
POCT GLUCOSE: 155 MG/DL (ref 70–110)
POCT GLUCOSE: 159 MG/DL (ref 70–110)
POCT GLUCOSE: 168 MG/DL (ref 70–110)
POCT GLUCOSE: 171 MG/DL (ref 70–110)
POCT GLUCOSE: 177 MG/DL (ref 70–110)
POTASSIUM SERPL-SCNC: 5 MMOL/L (ref 3.5–5.1)
POTASSIUM SERPL-SCNC: 5 MMOL/L (ref 3.5–5.1)
PROT SERPL-MCNC: 6.9 G/DL (ref 6–8.4)
RBC # BLD AUTO: 2.84 M/UL (ref 4–5.4)
SODIUM SERPL-SCNC: 134 MMOL/L (ref 136–145)
SODIUM SERPL-SCNC: 136 MMOL/L (ref 136–145)
WBC # BLD AUTO: 10.73 K/UL (ref 3.9–12.7)

## 2024-02-26 PROCEDURE — 99900022

## 2024-02-26 PROCEDURE — 83735 ASSAY OF MAGNESIUM: CPT | Mod: 91 | Performed by: STUDENT IN AN ORGANIZED HEALTH CARE EDUCATION/TRAINING PROGRAM

## 2024-02-26 PROCEDURE — 80069 RENAL FUNCTION PANEL: CPT | Performed by: STUDENT IN AN ORGANIZED HEALTH CARE EDUCATION/TRAINING PROGRAM

## 2024-02-26 PROCEDURE — 83735 ASSAY OF MAGNESIUM: CPT | Performed by: STUDENT IN AN ORGANIZED HEALTH CARE EDUCATION/TRAINING PROGRAM

## 2024-02-26 PROCEDURE — 99291 CRITICAL CARE FIRST HOUR: CPT | Mod: 24,25,, | Performed by: STUDENT IN AN ORGANIZED HEALTH CARE EDUCATION/TRAINING PROGRAM

## 2024-02-26 PROCEDURE — 25000003 PHARM REV CODE 250: Performed by: STUDENT IN AN ORGANIZED HEALTH CARE EDUCATION/TRAINING PROGRAM

## 2024-02-26 PROCEDURE — 99223 1ST HOSP IP/OBS HIGH 75: CPT | Mod: ,,, | Performed by: PHYSICIAN ASSISTANT

## 2024-02-26 PROCEDURE — 27000207 HC ISOLATION

## 2024-02-26 PROCEDURE — 20000000 HC ICU ROOM

## 2024-02-26 PROCEDURE — 80053 COMPREHEN METABOLIC PANEL: CPT

## 2024-02-26 PROCEDURE — 99900035 HC TECH TIME PER 15 MIN (STAT)

## 2024-02-26 PROCEDURE — 84100 ASSAY OF PHOSPHORUS: CPT | Performed by: STUDENT IN AN ORGANIZED HEALTH CARE EDUCATION/TRAINING PROGRAM

## 2024-02-26 PROCEDURE — 85025 COMPLETE CBC W/AUTO DIFF WBC: CPT

## 2024-02-26 PROCEDURE — 51798 US URINE CAPACITY MEASURE: CPT

## 2024-02-26 PROCEDURE — 25000242 PHARM REV CODE 250 ALT 637 W/ HCPCS: Performed by: STUDENT IN AN ORGANIZED HEALTH CARE EDUCATION/TRAINING PROGRAM

## 2024-02-26 PROCEDURE — 27000221 HC OXYGEN, UP TO 24 HOURS

## 2024-02-26 PROCEDURE — 63600175 PHARM REV CODE 636 W HCPCS

## 2024-02-26 PROCEDURE — 80100014 HC HEMODIALYSIS 1:1

## 2024-02-26 PROCEDURE — 99900026 HC AIRWAY MAINTENANCE (STAT)

## 2024-02-26 PROCEDURE — 94761 N-INVAS EAR/PLS OXIMETRY MLT: CPT

## 2024-02-26 PROCEDURE — 99233 SBSQ HOSP IP/OBS HIGH 50: CPT | Mod: ,,, | Performed by: INTERNAL MEDICINE

## 2024-02-26 RX ORDER — HYDROCODONE BITARTRATE AND ACETAMINOPHEN 500; 5 MG/1; MG/1
TABLET ORAL CONTINUOUS
Status: DISCONTINUED | OUTPATIENT
Start: 2024-02-26 | End: 2024-02-26

## 2024-02-26 RX ORDER — MAGNESIUM SULFATE HEPTAHYDRATE 40 MG/ML
2 INJECTION, SOLUTION INTRAVENOUS
Status: DISCONTINUED | OUTPATIENT
Start: 2024-02-26 | End: 2024-02-26

## 2024-02-26 RX ORDER — HYDRALAZINE HYDROCHLORIDE 25 MG/1
25 TABLET, FILM COATED ORAL EVERY 8 HOURS PRN
Status: DISCONTINUED | OUTPATIENT
Start: 2024-02-26 | End: 2024-03-05 | Stop reason: HOSPADM

## 2024-02-26 RX ADMIN — INSULIN ASPART 2 UNITS: 100 INJECTION, SOLUTION INTRAVENOUS; SUBCUTANEOUS at 08:02

## 2024-02-26 RX ADMIN — PSYLLIUM HUSK 1 PACKET: 3.4 POWDER ORAL at 08:02

## 2024-02-26 RX ADMIN — INSULIN ASPART 6 UNITS: 100 INJECTION, SOLUTION INTRAVENOUS; SUBCUTANEOUS at 11:02

## 2024-02-26 RX ADMIN — AMLODIPINE BESYLATE 10 MG: 10 TABLET ORAL at 08:02

## 2024-02-26 RX ADMIN — INSULIN ASPART 6 UNITS: 100 INJECTION, SOLUTION INTRAVENOUS; SUBCUTANEOUS at 03:02

## 2024-02-26 RX ADMIN — INSULIN ASPART 6 UNITS: 100 INJECTION, SOLUTION INTRAVENOUS; SUBCUTANEOUS at 04:02

## 2024-02-26 RX ADMIN — CARVEDILOL 25 MG: 25 TABLET, FILM COATED ORAL at 08:02

## 2024-02-26 RX ADMIN — INSULIN ASPART 6 UNITS: 100 INJECTION, SOLUTION INTRAVENOUS; SUBCUTANEOUS at 08:02

## 2024-02-26 RX ADMIN — FUROSEMIDE 120 MG: 10 INJECTION, SOLUTION INTRAVENOUS at 02:02

## 2024-02-26 RX ADMIN — INSULIN ASPART 1 UNITS: 100 INJECTION, SOLUTION INTRAVENOUS; SUBCUTANEOUS at 12:02

## 2024-02-26 RX ADMIN — HYDRALAZINE HYDROCHLORIDE 25 MG: 25 TABLET, FILM COATED ORAL at 09:02

## 2024-02-26 RX ADMIN — FUROSEMIDE 120 MG: 10 INJECTION, SOLUTION INTRAVENOUS at 08:02

## 2024-02-26 RX ADMIN — SEVELAMER CARBONATE 1.6 G: 800 POWDER, FOR SUSPENSION ORAL at 09:02

## 2024-02-26 RX ADMIN — HEPARIN SODIUM 7500 UNITS: 5000 INJECTION INTRAVENOUS; SUBCUTANEOUS at 09:02

## 2024-02-26 RX ADMIN — HEPARIN SODIUM 7500 UNITS: 5000 INJECTION INTRAVENOUS; SUBCUTANEOUS at 06:02

## 2024-02-26 RX ADMIN — SEVELAMER CARBONATE 1.6 G: 800 POWDER, FOR SUSPENSION ORAL at 08:02

## 2024-02-26 RX ADMIN — INSULIN DETEMIR 14 UNITS: 100 INJECTION, SOLUTION SUBCUTANEOUS at 08:02

## 2024-02-26 RX ADMIN — INSULIN ASPART 6 UNITS: 100 INJECTION, SOLUTION INTRAVENOUS; SUBCUTANEOUS at 12:02

## 2024-02-26 RX ADMIN — HEPARIN SODIUM 7500 UNITS: 5000 INJECTION INTRAVENOUS; SUBCUTANEOUS at 02:02

## 2024-02-26 RX ADMIN — INSULIN ASPART 1 UNITS: 100 INJECTION, SOLUTION INTRAVENOUS; SUBCUTANEOUS at 03:02

## 2024-02-26 RX ADMIN — SEVELAMER CARBONATE 1.6 G: 800 POWDER, FOR SUSPENSION ORAL at 02:02

## 2024-02-26 RX ADMIN — INSULIN ASPART 1 UNITS: 100 INJECTION, SOLUTION INTRAVENOUS; SUBCUTANEOUS at 08:02

## 2024-02-26 RX ADMIN — INSULIN ASPART 2 UNITS: 100 INJECTION, SOLUTION INTRAVENOUS; SUBCUTANEOUS at 11:02

## 2024-02-26 NOTE — SUBJECTIVE & OBJECTIVE
Interval History:     No acute events overnight. Patient scheduled for iHD today. LTACH would like 3 sessions of iHD. Session 2 today. Mentation remains unchanged.     Review of patient's allergies indicates:  No Known Allergies  Current Facility-Administered Medications   Medication Frequency    0.9%  NaCl infusion PRN    acetaminophen tablet 650 mg Q4H PRN    albuterol-ipratropium 2.5 mg-0.5 mg/3 mL nebulizer solution 3 mL Q4H PRN    amLODIPine tablet 10 mg Daily    carvediloL tablet 25 mg BID    dextrose 10 % infusion Continuous PRN    dextrose 10% bolus 125 mL 125 mL PRN    dextrose 10% bolus 250 mL 250 mL PRN    furosemide injection 120 mg TID    glucagon (human recombinant) injection 1 mg PRN    glucose chewable tablet 16 g PRN    glucose chewable tablet 24 g PRN    heparin (porcine) injection 7,500 Units Q8H    hydrALAZINE tablet 25 mg Q8H PRN    insulin aspart U-100 pen 0-10 Units Q4H PRN    insulin aspart U-100 pen 6 Units Q4H    insulin detemir U-100 (Levemir) pen 14 Units Daily    labetalol 20 mg/4 mL (5 mg/mL) IV syring Q6H PRN    naloxone 0.4 mg/mL injection 0.02 mg PRN    ondansetron injection 4 mg Q6H PRN    prochlorperazine injection Soln 5 mg Q6H PRN    psyllium husk (aspartame) 3.4 gram packet 1 packet Daily    sevelamer carbonate pwpk 1.6 g TID    sodium chloride 0.9% bolus 250 mL 250 mL PRN    sodium chloride 0.9% bolus 250 mL 250 mL PRN    sodium chloride 0.9% flush 10 mL PRN       Objective:     Vital Signs (Most Recent):  Temp: 99.8 °F (37.7 °C) (02/26/24 0715)  Pulse: 98 (02/26/24 0900)  Resp: 19 (02/26/24 0900)  BP: 137/66 (02/26/24 0841)  SpO2: 98 % (02/26/24 0900) Vital Signs (24h Range):  Temp:  [98.5 °F (36.9 °C)-99.8 °F (37.7 °C)] 99.8 °F (37.7 °C)  Pulse:  [] 98  Resp:  [11-38] 19  SpO2:  [94 %-100 %] 98 %  BP: (107-166)/() 137/66     Weight: (!) 150.9 kg (332 lb 10.8 oz) (02/21/24 0300)  Body mass index is 55.36 kg/m².  Body surface area is 2.63 meters squared.    I/O last  3 completed shifts:  In: 2010 [NG/GT:2010]  Out: 255 [Other:155; Stool:100]     Physical Exam  Vitals and nursing note reviewed.   Constitutional:       General: She is not in acute distress.     Appearance: She is ill-appearing.   HENT:      Head: Normocephalic and atraumatic.   Eyes:      Extraocular Movements: Extraocular movements intact.      Conjunctiva/sclera: Conjunctivae normal.   Neck:      Comments: Left IJ Trialysis catheter  Cardiovascular:      Rate and Rhythm: Normal rate and regular rhythm.   Pulmonary:      Effort: Pulmonary effort is normal.      Comments: trached    Abdominal:      General: There is no distension.      Palpations: Abdomen is soft.      Tenderness: There is no abdominal tenderness.      Comments: PEG in place   Genitourinary:     Comments: Glynn in place    Skin:     General: Skin is warm and dry.      Comments: Wound vac in place to R thigh/groin to suction  Re approximated skin clean dry and intact   Neurological:      General: No focal deficit present.      Mental Status: She is oriented to person, place, and time.   Psychiatric:         Mood and Affect: Mood normal.         Behavior: Behavior normal.          Significant Labs:  All labs within the past 24 hours have been reviewed.     Significant Imaging:  Labs: Reviewed

## 2024-02-26 NOTE — PROGRESS NOTES
[facility]  Nephrology  Progress Note    Patient Name: Sarah Saravia  MRN: [id number]  Admission Date: 1/29/2024  Hospital Length of Stay: 28 days  Attending Provider: Steve Cole MD   Primary Care Physician: Patricia [facility]  Principal Problem:Ayaz's gangrene    Subjective:     HPI: Sarah Saravia is a 53 year old female with a past medical history of obesity (BMI 53) admitted with N/V and R groin wound found to be in DKA at OSH.  She underwent a CT abdomen and pelvis that showed extensive soft tissue air throughout the right groin and inguinal region and extending to involve the right lower quadrant anterior abdominal wall with extensive soft tissue air also seen involving the proximal medial aspect of the right thigh, concerning for gas-forming infection. She is s/p OR debridement for necrotizing fascitis on 1/29/24 and take back for 1/31/24. Right groin tissue growing proteus, susceptibilities still pending. Currently on meropenem and clindamycin which was d/c'd on 1/31/24. While at OSH pt developed acute respiratory failure requiring intubation. Nephrology was consulted for worsening alvarado in the setting of lactic acidosis and septic shock likely ATN, sCr elevated at 3.8 on admit.  OR today for debridement with possible closure and wound vac placement. Last HD 2/1. Net -1.3L. Electrolytes stable. Nephrology consulted for ALVARADO.     Interval History:     No acute events overnight. Patient scheduled for iHD today. LTACH would like 3 sessions of iHD. Session 2 today. Mentation remains unchanged.     Review of patient's allergies indicates:  No Known Allergies  Current Facility-Administered Medications   Medication Frequency    0.9%  NaCl infusion PRN    acetaminophen tablet 650 mg Q4H PRN    albuterol-ipratropium 2.5 mg-0.5 mg/3 mL nebulizer solution 3 mL Q4H PRN    amLODIPine tablet 10 mg Daily    carvediloL tablet 25 mg BID    dextrose 10 % infusion Continuous  PRN    dextrose 10% bolus 125 mL 125 mL PRN    dextrose 10% bolus 250 mL 250 mL PRN    furosemide injection 120 mg TID    glucagon (human recombinant) injection 1 mg PRN    glucose chewable tablet 16 g PRN    glucose chewable tablet 24 g PRN    heparin (porcine) injection 7,500 Units Q8H    hydrALAZINE tablet 25 mg Q8H PRN    insulin aspart U-100 pen 0-10 Units Q4H PRN    insulin aspart U-100 pen 6 Units Q4H    insulin detemir U-100 (Levemir) pen 14 Units Daily    labetalol 20 mg/4 mL (5 mg/mL) IV syring Q6H PRN    naloxone 0.4 mg/mL injection 0.02 mg PRN    ondansetron injection 4 mg Q6H PRN    prochlorperazine injection Soln 5 mg Q6H PRN    psyllium husk (aspartame) 3.4 gram packet 1 packet Daily    sevelamer carbonate pwpk 1.6 g TID    sodium chloride 0.9% bolus 250 mL 250 mL PRN    sodium chloride 0.9% bolus 250 mL 250 mL PRN    sodium chloride 0.9% flush 10 mL PRN       Objective:     Vital Signs (Most Recent):  Temp: 99.8 °F (37.7 °C) (02/26/24 0715)  Pulse: 98 (02/26/24 0900)  Resp: 19 (02/26/24 0900)  BP: 137/66 (02/26/24 0841)  SpO2: 98 % (02/26/24 0900) Vital Signs (24h Range):  Temp:  [98.5 °F (36.9 °C)-99.8 °F (37.7 °C)] 99.8 °F (37.7 °C)  Pulse:  [] 98  Resp:  [11-38] 19  SpO2:  [94 %-100 %] 98 %  BP: (107-166)/() 137/66     Weight: (!) 150.9 kg (332 lb 10.8 oz) (02/21/24 0300)  Body mass index is 55.36 kg/m².  Body surface area is 2.63 meters squared.    I/O last 3 completed shifts:  In: 2010 [NG/GT:2010]  Out: 255 [Other:155; Stool:100]     Physical Exam  Vitals and nursing note reviewed.   Constitutional:       General: She is not in acute distress.     Appearance: She is ill-appearing.   HENT:      Head: Normocephalic and atraumatic.   Eyes:      Extraocular Movements: Extraocular movements intact.      Conjunctiva/sclera: Conjunctivae normal.   Neck:      Comments: Left IJ Trialysis catheter  Cardiovascular:      Rate and Rhythm: Normal rate and regular rhythm.   Pulmonary:      Effort:  Pulmonary effort is normal.      Comments: trached    Abdominal:      General: There is no distension.      Palpations: Abdomen is soft.      Tenderness: There is no abdominal tenderness.      Comments: PEG in place   Genitourinary:     Comments: Glynn in place    Skin:     General: Skin is warm and dry.      Comments: Wound vac in place to R thigh/groin to suction  Re approximated skin clean dry and intact   Neurological:      General: No focal deficit present.      Mental Status: She is oriented to person, place, and time.   Psychiatric:         Mood and Affect: Mood normal.         Behavior: Behavior normal.          Significant Labs:  All labs within the past 24 hours have been reviewed.     Significant Imaging:  Labs: Reviewed  Assessment/Plan:     Neuro  Cerebral infarction  Per primary team.     Renal/  * Ayaz's gangrene  - management per primary     ALVARADO (acute kidney injury)  Transfer from [facility]. Admitted for fourniers gangrene. Initiated HD on 1/31. ALVARADO 2/2 ATN in setting of septic shock. Arrived with CR 3.8. Unknown baseline. S/P OR debridement with possible closure and wound vac placement     ALVARADO most likley ATN in setting of septic shock     Plan/Recommendation  - iHD today for metabolic clearance and volume removal.    - Please consult IR for perm cath placement if clear from infection stand point.   - Please dose all ABX in accordance to RRT schedule (zosyn, etc)  - Continue IV lasix 120mg TID. Please record UOP. If no significant UOP can DC lasix.   -Daily RFP   -Strict I&Os  -Avoid nephrotoxins, if possible     ID  Severe sepsis  -        Thank you for your consult. I will follow-up with patient. Please contact us if you have any additional questions.    Case discussed with attending. Attestation to follow.       Antione Hazel DO  Nephrology  [facility]

## 2024-02-26 NOTE — ANESTHESIA POSTPROCEDURE EVALUATION
Anesthesia Post Evaluation    Patient: Sarah Saravia    Procedure(s) Performed: Procedure(s) (LRB):  CREATION, TRACHEOSTOMY (N/A)  EGD, WITH PEG TUBE INSERTION (N/A)  CLOSURE, WOUND (Right)  REPLACEMENT, WOUND VAC (Right)    Final Anesthesia Type: general      Patient location during evaluation: ICU  Patient participation: No - Unable to Participate, Intubation  Level of consciousness: sedated  Post-procedure vital signs: reviewed and stable  Pain management: adequate  Airway patency: patent    PONV status at discharge: No PONV  Anesthetic complications: no      Cardiovascular status: blood pressure returned to baseline and hemodynamically stable  Respiratory status: intubated and ventilator  Hydration status: euvolemic  Follow-up not needed.              Vitals Value Taken Time   /71 02/26/24 1032   Temp 37.7 °C (99.8 °F) 02/26/24 0715   Pulse 92 02/26/24 1047   Resp 7 02/26/24 1047   SpO2 96 % 02/26/24 1047   Vitals shown include unvalidated device data.      No case tracking events are documented in the log.      Pain/Lucía Score: No data recorded

## 2024-02-26 NOTE — ASSESSMENT & PLAN NOTE
Neuro/Psych:   Acute Encephalopathy  CTH 2/3 with scattered hypoattenuation likely representing age indeterminate infarcts. EEG findings consistent with moderate-severe encephalopathy. MRI 2/6: Several scattered punctate acute infarcts throughout the bilateral frontal lobes, centrum semiovale, basal ganglia, corpus callosum, and cerebellar hemispheres.  CTA 2/6: Atherosclerotic plaquing of the carotid bifurcations and proximal ICAs with less than 50% proximal ICA stenosis by NASCET criteria . Repeat CTH and MRI/MRA unchanged from prior exams. S/p multiple wound vac exchanges. Palliative onboard with family wanting full measures.    Neuro/Psych  Repeat CTH and MRI/MRA stable from initial reads. LP 2/16. Elevated WBCs and protein consistent with bacterial meningitis.  -- Sedation: None  -- Pain: kermit tylenol, dialudid and oxy prn  -- GCS 4T: E2, V1T, M1; exam stable  -- Neurology following; appreciate recs  -- Neurovascular following previously; signed off 2/17  -- Palliative following; GOC conversation 2/7; appreciate recs  -- CSF culture 2/15 with no growth             Cards:   -- HDS  -- goal SBP < 180, MAP >65  -- normotensive; hydralazine and labetalol prns  -- Continue amlodipine 10mg daily; coreg 25mg BID      Pulm:   Acute Respiratory Failure  -- tracheostomy on 2/22.  Tolerating trach collar  -- Goal O2 sat > 90%      Renal:  ALVARADO on CKD  ATN 2/2 septic shock  2/13 LIJ trialysis  -- Reduced UOP since meza out  -- Nephrology following; appreciate recs   -- iHD 2/24  -- On sevelamer  -- Replace lytes as needed.  -- Continue lasix 120mg IV tid   -- Plan to place a permacath for LTAC dialysis-- once we have a set discharge date from ICU to LTACH, interventional IR will place line; appreciate help     Recent Labs   Lab 02/24/24  2141 02/25/24  0320 02/26/24  0355   BUN 30* 35*  35* 57*  53*   CREATININE 2.9* 3.4*  3.3* 4.4*  4.4*        FEN / GI:   -- Daily CMPs  -- NGT in place   -- Replace lytes as  needed  -- Nutrition: NPO, TF (Peptamen Intense) at goal  -- GI PPX   -- Nutrition following; appreciate recs  -- Continue psyllium and sevelamer      ID:   Ayaz's gangrene  s/p wound vac exchange/ debridement x4 for nec fascitis. R groin tissue with proteus, sensitive to ceftriaxone. Growing bacteroids as well. Urine cx 2/18 growing C. Indologens.  -- Abx: none  --completed zosyn, EOT 2/22   -- ID signed off 2/21     Heme/Onc:  -- CBC daily  -- Heparin DVT ppx  -- Transfuse if Hgb <7     Endo:   IDDM  Initially presented with DKA   Placed on insulin gtt 2/3 and dced 2/12.    - q4h BG monitoring while NPO  - Detemir 14 units daily  - Novolog 6units q4h while on TFs  - Moderate dose SSI PRN      PPx:   Feeding: TF at goal  Analgesia/Sedation: See above  Thromboembolic prevention: SQH   HOB >30: Y  Stress Ulcer ppx: Famotidine  Glucose control: Goal 140-180 g/dl, kermit detemir and novolog, SSI, Endo following  Bowel reg: psyllium  Invasive Lines/Drains/Airway: trach, LIJ Trialysis, PIV x2      Dispo/Code Status/Palliative:   -- SICU / Full Code   -- Planning transition to LTAC; appreciate social work help

## 2024-02-26 NOTE — PLAN OF CARE
Recommendations     1.) TF recs: continue with Peptamen VHP @50ml/hr to provide 1200 kcal, 110g PRO, and 1008ml FF- FWF per MD.                 - if pt still undergoing CRRT, may increase Peptamen VHP to 60ml/hr to provide 1440 kcal, 132g PRO, and 1210ml FF- FWF per MD.      2.)  Monitor for s/s of intolerance such as residuals >500ml, n/v/d, or abdominal distension.      3.)  RD to monitor tolerance, skin, labs, wt.      Goals: Meet % EEN/EPN by follow-up date.  Nutrition Goal Status: goal met  Communication of RD Recs:  (POC)

## 2024-02-26 NOTE — SUBJECTIVE & OBJECTIVE
Interval History/Significant Events: Pt seen and examined at bedside. ESDRAS MULLER. Decrease urine out since removal of meza. Pthas been accepted at [facility]. Transfer is pending successful HD sessions and placement of permacath by IR. Plan to undergo bedside wound vac today      Follow-up For: Procedure(s) (LRB):  CREATION, TRACHEOSTOMY (N/A)  EGD, WITH PEG TUBE INSERTION (N/A)  CLOSURE, WOUND (Right)  REPLACEMENT, WOUND VAC (Right)    Post-Operative Day: 4 Days Post-Op    Objective:     Vital Signs (Most Recent):  Temp: 99.8 °F (37.7 °C) (02/26/24 0715)  Pulse: 95 (02/26/24 0715)  Resp: (!) 31 (02/26/24 0715)  BP: (!) 141/64 (02/26/24 0700)  SpO2: 99 % (02/26/24 0715) Vital Signs (24h Range):  Temp:  [98.5 °F (36.9 °C)-99.8 °F (37.7 °C)] 99.8 °F (37.7 °C)  Pulse:  [] 95  Resp:  [16-38] 31  SpO2:  [94 %-100 %] 99 %  BP: (107-166)/() 141/64     Weight: (!) 150.9 kg (332 lb 10.8 oz)  Body mass index is 55.36 kg/m².      Intake/Output Summary (Last 24 hours) at 2/26/2024 0742  Last data filed at 2/26/2024 0600  Gross per 24 hour   Intake 1410 ml   Output 230 ml   Net 1180 ml          Physical Exam     Vitals and nursing note reviewed.   Constitutional:       General: She is not in acute distress.     Appearance: She is ill-appearing.   HENT:      Head: Normocephalic and atraumatic.   Eyes:      Extraocular Movements: Extraocular movements intact.      Conjunctiva/sclera: Conjunctivae normal.   Neck:      Comments: Left IJ Trialysis catheter  Cardiovascular:      Rate and Rhythm: Normal rate and regular rhythm.   Pulmonary:      Effort: Pulmonary effort is normal.      Comments: tracheostomy  Abdominal:      General: There is no distension.      Palpations: Abdomen is soft.      Tenderness: There is no abdominal tenderness.   Genitourinary:     Comments: fecal incont manager  Skin:     General: Skin is warm and dry.      Comments: Wound vac in place to groin to suction   Neurological:      General:  No focal deficit present.      Mental Status: She is oriented to person, place, and time.   Psychiatric:         Mood and Affect: Mood normal.         Behavior: Behavior normal.   Vents:  Vent Mode: Spont (02/23/24 0708)  Set Rate: 18 BPM (02/06/24 0349)  Vt Set: 420 mL (02/06/24 0349)  Pressure Support: 10 cmH20 (02/23/24 0708)  PEEP/CPAP: 8 cmH20 (02/23/24 0708)  Oxygen Concentration (%): 28 (02/26/24 0710)  Peak Airway Pressure: 43 cmH20 (02/23/24 0708)  Plateau Pressure: 15 cmH20 (02/23/24 0708)  Total Ve: 5.16 L/m (02/23/24 0708)  Negative Inspiratory Force (cm H2O): 0 (02/23/24 0708)  F/VT Ratio<105 (RSBI): (!) 32.33 (02/23/24 0319)    Lines/Drains/Airways       Central Venous Catheter Line  Duration             Trialysis (Dialysis) Catheter 02/11/24 2303 left internal jugular 14 days              Drain  Duration                  Fecal Incontinence  02/22/24 0900 3 days         Gastrostomy/Enterostomy 02/22/24 1200 LUQ 3 days         Open Drain 02/22/24 1414 Tube - 1 Right Thigh Penrose Other (see comments) 3 days              Airway  Duration             Adult Surgical Airway 02/22/24 Shiley Cuffed 8.0 / 85 mm 4 days              Peripheral Intravenous Line  Duration                  Peripheral IV - Single Lumen 02/22/24 0015 22 G Left;Posterior Forearm 4 days         Peripheral IV - Single Lumen 02/22/24 0020 20 G Posterior;Right Forearm 4 days         Peripheral IV - Single Lumen 02/25/24 1601 18 G Left Antecubital <1 day                    Significant Labs:    CBC/Anemia Profile:  Recent Labs   Lab 02/25/24  0320 02/26/24  0355   WBC 10.72 10.73   HGB 8.0* 7.9*   HCT 25.3* 25.0*    494*   MCV 89 88   RDW 17.7* 17.4*        Chemistries:  Recent Labs   Lab 02/24/24  2141 02/25/24  0320 02/26/24  0355    137  136 136  134*   K 4.0 4.3  4.3 5.0  5.0    101  101 99  100   CO2 25 25  24 23  25   BUN 30* 35*  35* 57*  53*   CREATININE 2.9* 3.4*  3.3* 4.4*  4.4*   CALCIUM  8.7 8.9  8.8 8.9  8.8   ALBUMIN 2.1* 2.1*  2.1* 2.2*  2.2*   PROT  --  7.7 6.9   BILITOT  --  0.3 0.2   ALKPHOS  --  121 151*   ALT  --  16 27   AST  --  35 50*   MG 2.1 2.1  2.1 2.2  2.3   PHOS 2.4* 2.8  2.8 3.5  3.7       Significant Imaging:  I have reviewed all pertinent imaging results/findings within the past 24 hours.

## 2024-02-26 NOTE — CONSULTS
Tunneled Dialysis Catheter Placement Consult Note  Interventional Radiology    Consult Requested By:   Bree Lewis DNP     Reason for Consult: Permacath placement. Discharge date 3/4/24. LTAC accepted patient    SUBJECTIVE:     Chief Complaint:  in need of long term HD access    History of Present Illness:  Sarah Saravia is a 53 y.o. 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 She undwerwent OR debridement x 2 (1/29, 1/31). Hospital course at OSH also notable for acute resp failure requiring intubation, worsening ALVARADO requiring CRRT. Pt was then transferred to [facility] for higher level of care on 2/1/24. Hospital course at [facility] notable for persistent AMS, found to have multiple infarcts on MRI brain. No improvement in neuro status, however family opting to proceed with all measures including surgical trach, PEG, lower wound closure, and wound vac placement which was performed on 2/22/24. Interventional Radiology has been consulted for TDC placement for HD access. Pt was previously on CRRT, is trialing HD today. She has a L IJ trialysis in place. She was recently accepted to [facility], prospective transfer date is 3/4/24. Her WBC is stable at 10.73. The pt is hemodynamically stable. Currently satting well via trach collar. Boyfriend currently at bedside.     Scheduled Meds:   amLODIPine  10 mg Per G Tube Daily    carvediloL  25 mg Per G Tube BID    furosemide (LASIX) injection  120 mg Intravenous TID    heparin (porcine)  7,500 Units Subcutaneous Q8H    insulin aspart U-100  6 Units Subcutaneous Q4H    insulin detemir U-100  14 Units Subcutaneous Daily    psyllium husk (aspartame)  1 packet Per G Tube Daily    sevelamer carbonate  1.6 g Per G Tube TID     Continuous Infusions:   dextrose 10 % in water (D10W)       PRN Meds:sodium chloride 0.9%, acetaminophen, albuterol-ipratropium, dextrose 10 % in  water (D10W), dextrose 10%, dextrose 10%, glucagon (human recombinant), glucose, glucose, hydrALAZINE, insulin aspart U-100, labetalol, naloxone, ondansetron, prochlorperazine, sodium chloride 0.9%, sodium chloride 0.9%, sodium chloride 0.9%    Review of patient's allergies indicates:  No Known Allergies    History reviewed. No pertinent past medical history.  Past Surgical History:   Procedure Laterality Date    CLOSURE OF WOUND Right 2/22/2024    Procedure: CLOSURE, WOUND;  Surgeon: Steve Cole MD;  Location: [address];  Service: General;  Laterality: Right;    ESOPHAGOGASTRODUODENOSCOPY W/ PEG N/A 2/22/2024    Procedure: EGD, WITH PEG TUBE INSERTION;  Surgeon: Steve Cole MD;  Location: [facility] OR [address] [place] [address];  Service: General;  Laterality: N/A;    INCISION AND DRAINAGE OF PERIRECTAL REGION N/A 1/29/2024    Procedure: INCISION AND DRAINAGE, PERIRECTAL REGION;  Surgeon: Axel Ramsay MD;  Location: [facility] OR;  Service: General;  Laterality: N/A;    LUMBAR PUNCTURE N/A 2/16/2024    Procedure: Lumbar Puncture;  Surgeon: Macy Boykin;  Location: [facility];  Service: Anesthesiology;  Laterality: N/A;    PLACEMENT, TRIALYSIS CATH Right 1/31/2024    Procedure: INSERTION, CATHETER, TRIPLE LUMEN, HEMODIALYSIS, TEMPORARY;  Surgeon: Alvin Junior MD;  Location: [facility] OR;  Service: General;  Laterality: Right;    REPLACEMENT OF WOUND VACUUM-ASSISTED CLOSURE DEVICE Right 2/12/2024    Procedure: REPLACEMENT, WOUND VAC;  Surgeon: Mundo Carmona MD;  Location: [address];  Service: General;  Laterality: Right;    REPLACEMENT OF WOUND VACUUM-ASSISTED CLOSURE DEVICE N/A 2/15/2024    Procedure: REPLACEMENT, WOUND VAC;  Surgeon: Steve Cole MD;  Location: [address];  Service: General;  Laterality: N/A;    REPLACEMENT OF WOUND VACUUM-ASSISTED CLOSURE DEVICE Right 2/19/2024    Procedure: REPLACEMENT, WOUND VAC;  Surgeon: Steve Cole MD;  Location: [facility] OR [address] [place] [address];  Service: General;  Laterality:  Right;  RLE/groin    REPLACEMENT OF WOUND VACUUM-ASSISTED CLOSURE DEVICE Right 2/22/2024    Procedure: REPLACEMENT, WOUND VAC;  Surgeon: Steve Cole MD;  Location: [facility] OR [facility];  Service: General;  Laterality: Right;    TRACHEOSTOMY N/A 2/22/2024    Procedure: CREATION, TRACHEOSTOMY;  Surgeon: Steve Cole MD;  Location: [facility] OR [facility];  Service: General;  Laterality: N/A;    WOUND DEBRIDEMENT Bilateral 2/2/2024    Procedure: DEBRIDEMENT, WOUND;  Surgeon: Steve Coel MD;  [facility];  Service: General;  Laterality: Bilateral;  Bilateral groin  Possible wound vac placement    WOUND DEBRIDEMENT Right 2/6/2024    Procedure: DEBRIDEMENT, WOUND, replace wound vac, possible closure;  Surgeon: Steve Cole MD;  Location: [facility];  Service: General;  Laterality: Right;  RLE    WOUND DEBRIDEMENT Right 2/9/2024    Procedure: DEBRIDEMENT, WOUND w wound vac change;  Surgeon: Steve Cole MD;  Location: [facility];  Service: General;  Laterality: Right;  RLE    WOUND DEBRIDEMENT Right 2/12/2024    Procedure: R thigh wound debridement;  Surgeon: Mundo Carmona MD;  Location: [facility];  Service: General;  Laterality: Right;    WOUND EXPLORATION Right 1/31/2024    Procedure: IRRIGATION & DEBRIDEMENT, WOUND DEBRIDEMENT;  Surgeon: Alvin Junior MD;  Location: [facility] OR;  Service: General;  Laterality: Right;     History reviewed. No pertinent family history.       OBJECTIVE:     Vital Signs (Most Recent)  Temp: 99 °F (37.2 °C) (02/26/24 1115)  Pulse: 92 (02/26/24 1115)  Resp: 15 (02/26/24 1115)  BP: (!) 143/69 (02/26/24 1100)  SpO2: 96 % (02/26/24 1115)    Physical Exam:  Physical Exam  Vitals and nursing note reviewed.   Constitutional:       General: She is not in acute distress.     Appearance: She is obese. She is ill-appearing.   HENT:      Head: Normocephalic and atraumatic.   Neck:      Comments: Trach collar  Cardiovascular:      Rate and Rhythm: Normal rate.  "  Pulmonary:      Effort: Pulmonary effort is normal. No respiratory distress.   Abdominal:      General: Abdomen is flat. There is no distension.      Comments: G tube   Skin:     General: Skin is warm and dry.      Coloration: Skin is not jaundiced.   Neurological:      Mental Status: She is disoriented.         Laboratory  I have reviewed all pertinent lab results within the past 24 hours.  CBC:   Recent Labs   Lab 02/26/24 0355   WBC 10.73   RBC 2.84*   HGB 7.9*   HCT 25.0*   *   MCV 88   MCH 27.8   MCHC 31.6*     BMP:   Recent Labs   Lab 02/26/24 0355   *  154*     134*   K 5.0  5.0   CL 99  100   CO2 23  25   BUN 57*  53*   CREATININE 4.4*  4.4*   CALCIUM 8.9  8.8   MG 2.2  2.3     CMP:   Recent Labs   Lab 02/26/24 0355   *  154*   CALCIUM 8.9  8.8   ALBUMIN 2.2*  2.2*   PROT 6.9     134*   K 5.0  5.0   CO2 23  25   CL 99  100   BUN 57*  53*   CREATININE 4.4*  4.4*   ALKPHOS 151*   ALT 27   AST 50*   BILITOT 0.2     LFTs:   Recent Labs   Lab 02/26/24 0355   ALT 27   AST 50*   ALKPHOS 151*   BILITOT 0.2   PROT 6.9   ALBUMIN 2.2*  2.2*     Coagulation: No results for input(s): "LABPROT", "INR", "APTT" in the last 168 hours.  Microbiology Results (last 7 days)       Procedure Component Value Units Date/Time    Fungus culture [[id number]] Collected: 02/19/24 0820    Order Status: Completed Specimen: CSF (Spinal Fluid) from Cerebrospinal fluid (CSF) Updated: 02/26/24 1020     Fungus (Mycology) Culture Culture in progress    Culture, Anaerobe [[id number]] Collected: 02/19/24 0820    Order Status: Completed Specimen: CSF (Spinal Fluid) from Cerebrospinal fluid (CSF) Updated: 02/26/24 1007     Anaerobic Culture Culture in progress    CSF culture [[id number]] Collected: 02/19/24 0820    Order Status: Completed Specimen: CSF (Spinal Fluid) from Cerebrospinal fluid (CSF) Updated: 02/24/24 0514     CSF CULTURE No Growth     Gram Stain Result No WBC's      No " organisms seen    Aerobic culture [[id number]] Collected: 02/19/24 0820    Order Status: Completed Specimen: CSF (Spinal Fluid) from Cerebrospinal fluid (CSF) Updated: 02/22/24 1004     Aerobic Bacterial Culture No growth    AFB Culture & Smear [[id number]] Collected: 02/19/24 0820    Order Status: Completed Specimen: CSF (Spinal Fluid) from Cerebrospinal fluid (CSF) Updated: 02/20/24 2127     AFB Culture & Smear Culture in progress     AFB CULTURE STAIN No acid fast bacilli seen.            ASA/Mallampati  ASA: 3  Mallampati: 2    Imaging:  Recent imaging studies reviewed.     ASSESSMENT/PLAN:     Assessment:  53 y.o. female with a PMHx of ros's gangrene with very complicated hospital course who has been referred to IR for TDC placement for HD access. The procedure was discussed in great detail with the patient's significant other including thorough explanations of the potential risks and benefits of TDC placement. Risks include bleeding at the puncture site, infection, catheter related thrombus, catheter dysfunction, and central vein stenosis. The patient is a candidate for TDC placement under general anesthesia. Will obtain consent from the pt's daughter.     Plan:  Will proceed with TDC placement under general anesthesia later this week pending anesthesia availability- possibly 2/29/24 or 3/1/24.   Please keep pt NPO starting at midnight on 2/29/24.   Anticoagulation history reviewed.   Coagulation labs reviewed.  Thank you for the consult. Please contact with questions via [employer] or spectra    Molly Merida PA-C  Interventional Radiology  Spectra: [phone]

## 2024-02-26 NOTE — PLAN OF CARE
11:50 AM    The SW contacted Aleks Duran daughter to provide a update regarding the patient's d/c plans for LTACH.    [facility] - Interested     The SW faxed updated clinicals to [facility] via [facility] for review.    The SW will continue to follow.    Jahaira Mckeon LMSW  Case Management [facility]

## 2024-02-26 NOTE — ASSESSMENT & PLAN NOTE
Transfer from [facility]. Admitted for fourniers gangrene. Initiated HD on 1/31. ALVARADO 2/2 ATN in setting of septic shock. Arrived with CR 3.8. Unknown baseline. S/P OR debridement with possible closure and wound vac placement     ALVARADO most likley ATN in setting of septic shock     Plan/Recommendation  - iHD today for metabolic clearance and volume removal.    - Please consult IR for perm cath placement if clear from infection stand point.   - Please dose all ABX in accordance to RRT schedule (zosyn, etc)  - Continue IV lasix 120mg TID. Please record UOP. If no significant UOP can DC lasix.   -Daily RFP   -Strict I&Os  -Avoid nephrotoxins, if possible

## 2024-02-26 NOTE — ASSESSMENT & PLAN NOTE
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 Unfortunately, has multiple infarcts on MRI brain with unchanged neuro status, however, family would like to proceed with all measures. S/p trach, peg, and lower wound closure, replacement of wound vac in right groin on 2/22/24. Doing okay and doing better from a respiratory standpoint.    - Vac change today  - may need permacath, possibly midweek, will discuss with staff  - LP 2/16 - NGTD  - ID consulted for urine cultures - recommend broadening to zosyn    - UA growing Burkholderia species and Chryseibacterium Indologenes, recommend IV zosyn with stop date of 2/22  - Palliative following given overall poor prognosis, daughter would like everything done  - Stable on trach collar  - Okay for DVT ppx   - Remainder of care per SICU    Dispo: will need LTAC placement

## 2024-02-26 NOTE — CARE UPDATE
I have reviewed the chart of Sarah Saravia who is hospitalized for the following:    Active Hospital Problems    Diagnosis    *Ayaz's gangrene    Status post tracheostomy    S/P percutaneous endoscopic gastrostomy (PEG) tube placement    Acute cystitis without hematuria    Fever    Hyperphosphatemia    Hypoalbuminemia    Cerebral infarction    Encounter for palliative care    Ac isch multi vasc territories stroke    Leukocytosis    Encephalopathy    Encephalopathy, metabolic    Acute hypoxemic respiratory failure    Acute blood loss anemia    Acute renal failure with tubular necrosis    New onset type 2 diabetes mellitus    Metabolic acidosis    ALVARADO (acute kidney injury)    Hyponatremia    Diabetic acidosis without coma    Morbid (severe) obesity due to excess calories    Severe sepsis      Bree Lewis, POONAM  Unit Based [facility]

## 2024-02-26 NOTE — CARE UPDATE
General Surgery Wound Vac Change    Wound vac replaced earlier this morning.    Disconnected from suction, black sponge removed. Healthy layer of granulation tissue with appropriate bleeding noted. Wound size was roughly 10 cm along the inguinal ligament by 5 cm inferior superior and about 4 cm deep, overall wound was elliptical in shape. Small black sponge was placed, seal check revealed adequate seal, placed on 125 mmHg, new cannister placed. Pt tolerated the wound vac change well.    Dayo Salazar MD  General Surgery PGY-1

## 2024-02-26 NOTE — PLAN OF CARE
[facility] - Surgical Intensive Care  Discharge Reassessment    Primary Care Provider: [facility]    Expected Discharge Date: 2/29/2024    Reassessment (most recent)       Discharge Reassessment - 02/26/24 0954          Discharge Reassessment    Assessment Type Discharge Planning Reassessment     Discharge Plan discussed with: Adult children     Communicated ZEKE with patient/caregiver Date not available/Unable to determine     Discharge Plan A Long-term acute care facility (LTAC)     Discharge Plan B New Nursing Home placement - snf care facility     DME Needed Upon Discharge  none     Transition of Care Barriers None        Post-Acute Status    Post-Acute Authorization Placement     Post-Acute Placement Status Referrals Sent   [facility]                    Per MD's Note,   Interval History: NAEO. No HD yesterday. No significant clinical change. AF, HDS     Discharge Plan A and Plan B have been determined by review of patient's clinical status, future medical and therapeutic needs, and coverage/benefits for post-acute care in coordination with multidisciplinary team members.     [facility] is following this patient.       Jahaira Mckeon LMSW  Case Management [facility]

## 2024-02-26 NOTE — PLAN OF CARE
CM met with the team to request the wound dimensions be charted at the next wound vac change and that a permacath needs to be placed for HD at the [facility]

## 2024-02-26 NOTE — SUBJECTIVE & OBJECTIVE
Interval History: NAEO. No HD yesterday. No significant clinical change. AF, HDS    Medications:  Continuous Infusions:   sodium chloride 0.9%      dextrose 10 % in water (D10W)       Scheduled Meds:   amLODIPine  10 mg Per G Tube Daily    carvediloL  25 mg Per G Tube BID    furosemide (LASIX) injection  120 mg Intravenous TID    heparin (porcine)  7,500 Units Subcutaneous Q8H    insulin aspart U-100  6 Units Subcutaneous Q4H    insulin detemir U-100  14 Units Subcutaneous Daily    psyllium husk (aspartame)  1 packet Per G Tube Daily    sevelamer carbonate  1.6 g Per G Tube TID     PRN Meds:sodium chloride 0.9%, acetaminophen, albuterol-ipratropium, dextrose 10 % in water (D10W), dextrose 10%, dextrose 10%, glucagon (human recombinant), glucose, glucose, hydrALAZINE, insulin aspart U-100, labetalol, magnesium sulfate IVPB, naloxone, ondansetron, oxyCODONE, prochlorperazine, sodium chloride 0.9%, sodium chloride 0.9%, sodium chloride 0.9%, sodium phosphate 20.01 mmol in dextrose 5 % (D5W) 250 mL IVPB, sodium phosphate 30 mmol in dextrose 5 % (D5W) 250 mL IVPB, sodium phosphate 39.99 mmol in dextrose 5 % (D5W) 250 mL IVPB     Review of patient's allergies indicates:  No Known Allergies  Objective:     Vital Signs (Most Recent):  Temp: 99.8 °F (37.7 °C) (02/26/24 0715)  Pulse: 95 (02/26/24 0715)  Resp: (!) 31 (02/26/24 0715)  BP: (!) 141/64 (02/26/24 0700)  SpO2: 99 % (02/26/24 0715) Vital Signs (24h Range):  Temp:  [98.5 °F (36.9 °C)-99.8 °F (37.7 °C)] 99.8 °F (37.7 °C)  Pulse:  [] 95  Resp:  [16-38] 31  SpO2:  [94 %-100 %] 99 %  BP: (107-166)/() 141/64     Weight: (!) 150.9 kg (332 lb 10.8 oz)  Body mass index is 55.36 kg/m².    Intake/Output - Last 3 Shifts         02/24 0700 02/25 0659 02/25 0700 02/26 0659 02/26 0700 02/27 0659    Other 100      NG/GT 1450 1460     Total Intake(mL/kg) 1550 (10.3) 1460 (9.7)     Urine (mL/kg/hr)       Other 1675 130     Stool  100     Total Output 1675 230     Net  -125 +1230                     Physical Exam  Vitals and nursing note reviewed.   Constitutional:       General: She is not in acute distress.     Appearance: She is ill-appearing.   HENT:      Head: Normocephalic and atraumatic.   Eyes:      Comments: Not opening eyes to command    Neck:      Comments: Left IJ Trialysis catheter  Cardiovascular:      Rate and Rhythm: Normal rate and regular rhythm.   Pulmonary:      Effort: Pulmonary effort is normal.      Comments: trached    Abdominal:      General: There is no distension.      Palpations: Abdomen is soft.      Tenderness: There is no abdominal tenderness.      Comments: PEG in place   Genitourinary:     Comments: Glynn in place    Skin:     General: Skin is warm and dry.      Comments: Wound vac in place to R thigh/groin to suction  Re approximated skin clean dry and intact   Neurological:      Comments: Occasionally withdraws from painful stimuli. No significant purposeful activity           Significant Labs:  I have reviewed all pertinent lab results within the past 24 hours.  CBC:   Recent Labs   Lab 02/26/24  0355   WBC 10.73   RBC 2.84*   HGB 7.9*   HCT 25.0*   *   MCV 88   MCH 27.8   MCHC 31.6*     CMP:   Recent Labs   Lab 02/26/24  0355   *  154*   CALCIUM 8.9  8.8   ALBUMIN 2.2*  2.2*   PROT 6.9     134*   K 5.0  5.0   CO2 23  25   CL 99  100   BUN 57*  53*   CREATININE 4.4*  4.4*   ALKPHOS 151*   ALT 27   AST 50*   BILITOT 0.2       Significant Diagnostics:  I have reviewed all pertinent imaging results/findings within the past 24 hours.

## 2024-02-26 NOTE — PROGRESS NOTES
"[facility]  Adult Nutrition  Progress Note    SUMMARY       Recommendations    1.) TF recs: continue with Peptamen VHP @50ml/hr to provide 1200 kcal, 110g PRO, and 1008ml FF- FWF per MD.                 - if pt still undergoing CRRT, may increase Peptamen VHP to 60ml/hr to provide 1440 kcal, 132g PRO, and 1210ml FF- FWF per MD.      2.)  Monitor for s/s of intolerance such as residuals >500ml, n/v/d, or abdominal distension.      3.)  RD to monitor tolerance, skin, labs, wt.     Goals: Meet % EEN/EPN by follow-up date.  Nutrition Goal Status: goal met  Communication of RD Recs:  (POC)    Assessment and Plan    Nutrition Problem  Inadequate oral intake     Related to (etiology):   Diagnosis related symptoms     Signs and Symptoms (as evidenced by):   Intubated  NPO     Interventions/Recommendations (treatment strategy):  Collaboration with medical providers     Nutrition Diagnosis Status:   Continues     Reason for Assessment    Reason For Assessment: RD follow-up  Diagnosis:  (ros's gangrene)  Relevant Medical History: obesity, T2DM, CKD  Interdisciplinary Rounds: did not attend    General Information Comments: RD f/u: pt is s/p trach/PEG placement on 2/22, TF running at goal rates. RD was able to speak with RN, RN stated that pt is tolerating feeds well. Last HD was on 2/24. Mild to moderate edema noted. RD team to continue to monitor and f/u.    Nutrition Discharge Planning: pending medical course    Nutrition Risk Screen    Nutrition Risk Screen: tube feeding or parenteral nutrition    Nutrition/Diet History    Food Allergies: NKFA    Anthropometrics    Temp: 98.5 °F (36.9 °C)  Height Method: Stated  Height: 5' 5" (165.1 cm)  Height (inches): 65 in  Weight Method: Bed Scale  Weight: (!) 150.9 kg (332 lb 10.8 oz)  Weight (lb): (!) 332.68 lb  Ideal Body Weight (IBW), Female: 125 lb  % Ideal Body Weight, Female (lb): 285.6 %  BMI (Calculated): 55.4  BMI Grade: greater than 40 - " morbid obesity    Lab/Procedures/Meds    Pertinent Labs Reviewed: reviewed  Pertinent Labs Comments: BUN: 53, cr: 4.4, GFR: 11.4, gluc: 154, ALP: 2.2, AST: 50    Pertinent Medications Reviewed: reviewed  Pertinent Medications Comments: amlodipine, lasix, heparin, insulin, psyllium husk, sevelamer, NaCl    Estimated/Assessed Needs    Weight Used For Calorie Calculations: 57 kg (125 lb 10.6 oz) (IBW d/t BMI >50.0)  Energy Calorie Requirements (kcal): 1254- 1425 kcal  Energy Need Method: Kcal/kg (22-25 kcal/kg of IBW)    Protein Requirements: 114- 143g (2.0-2.5g/kg)  Weight Used For Protein Calculations: 57 kg (125 lb 10.6 oz) (IBW d/t BMI >50.0)    Estimated Fluid Requirement Method: RDA Method  RDA Method (mL): 1254    Nutrition Prescription Ordered    Current Diet Order: NPO  Current Nutrition Support Formula Ordered: Peptamen Intense VHP  Current Nutrition Support Rate Ordered: 50 (ml)    Evaluation of Received Nutrient/Fluid Intake    Enteral Calories (kcal): 1200  Enteral Protein (gm): 110  Enteral (Free Water) Fluid (mL): 1008  I/O: -798ml since 2/19  Energy Calories Required: meeting needs  Protein Required: meeting needs  Fluid Required:  (as per MD)  Comments: LBM 2/23 (creamy)  Tolerance: tolerating  % Intake of Estimated Energy Needs: 75 - 100 %  % Meal Intake: NPO    Nutrition Risk    Level of Risk/Frequency of Follow-up:  (RD to f/u x 1-2/week)     Monitor and Evaluation    Food and Nutrient Intake: enteral nutrition intake, parenteral nutrition intake  Food and Nutrient Adminstration: enteral and parenteral nutrition administration  Knowledge/Beliefs/Attitudes: food and nutrition knowledge/skill, beliefs and attitudes  Physical Activity and Function: nutrition-related ADLs and IADLs, factors affecting access to physical activity  Anthropometric Measurements: weight, weight change, body mass index  Biochemical Data, Medical Tests and Procedures: electrolyte and renal panel  Nutrition-Focused Physical  Findings: overall appearance     Nutrition Follow-Up    RD Follow-up?: Yes

## 2024-02-26 NOTE — PROGRESS NOTES
[facility]  Critical Care - Surgery  Progress Note    Patient Name: Sarah Saravia  MRN: [id number]  Admission Date: 1/29/2024  Hospital Length of Stay: 28 days  Code Status: Full Code  Attending Provider: Steve Cole MD  Primary Care Provider: [facility]   Principal Problem: Ayaz's gangrene    Subjective:     Hospital/ICU Course:  No notes on file    Interval History/Significant Events: Pt seen and examined at bedside. VSS .  NAEON. Decrease urine out since removal of meza. Pthas been accepted at [facility]. Transfer is pending successful HD sessions and placement of permacath by IR. Plan to undergo bedside wound vac today      Follow-up For: Procedure(s) (LRB):  CREATION, TRACHEOSTOMY (N/A)  EGD, WITH PEG TUBE INSERTION (N/A)  CLOSURE, WOUND (Right)  REPLACEMENT, WOUND VAC (Right)    Post-Operative Day: 4 Days Post-Op    Objective:     Vital Signs (Most Recent):  Temp: 99.8 °F (37.7 °C) (02/26/24 0715)  Pulse: 95 (02/26/24 0715)  Resp: (!) 31 (02/26/24 0715)  BP: (!) 141/64 (02/26/24 0700)  SpO2: 99 % (02/26/24 0715) Vital Signs (24h Range):  Temp:  [98.5 °F (36.9 °C)-99.8 °F (37.7 °C)] 99.8 °F (37.7 °C)  Pulse:  [] 95  Resp:  [16-38] 31  SpO2:  [94 %-100 %] 99 %  BP: (107-166)/() 141/64     Weight: (!) 150.9 kg (332 lb 10.8 oz)  Body mass index is 55.36 kg/m².      Intake/Output Summary (Last 24 hours) at 2/26/2024 0742  Last data filed at 2/26/2024 0600  Gross per 24 hour   Intake 1410 ml   Output 230 ml   Net 1180 ml          Physical Exam     Vitals and nursing note reviewed.   Constitutional:       General: She is not in acute distress.     Appearance: She is ill-appearing.   HENT:      Head: Normocephalic and atraumatic.   Eyes:      Extraocular Movements: Extraocular movements intact.      Conjunctiva/sclera: Conjunctivae normal.   Neck:      Comments: Left IJ Trialysis catheter  Cardiovascular:      Rate and Rhythm: Normal rate  and regular rhythm.   Pulmonary:      Effort: Pulmonary effort is normal.      Comments: tracheostomy  Abdominal:      General: There is no distension.      Palpations: Abdomen is soft.      Tenderness: There is no abdominal tenderness.   Genitourinary:     Comments: fecal incont manager  Skin:     General: Skin is warm and dry.      Comments: Wound vac in place to groin to suction, wound size 10cm x 5cm  Neurological:      General: No focal deficit present.      Mental Status: She is oriented to person, place, and time.   Psychiatric:         Mood and Affect: Mood normal.         Behavior: Behavior normal.   Vents:  Vent Mode: Spont (02/23/24 0708)  Set Rate: 18 BPM (02/06/24 0349)  Vt Set: 420 mL (02/06/24 0349)  Pressure Support: 10 cmH20 (02/23/24 0708)  PEEP/CPAP: 8 cmH20 (02/23/24 0708)  Oxygen Concentration (%): 28 (02/26/24 0710)  Peak Airway Pressure: 43 cmH20 (02/23/24 0708)  Plateau Pressure: 15 cmH20 (02/23/24 0708)  Total Ve: 5.16 L/m (02/23/24 0708)  Negative Inspiratory Force (cm H2O): 0 (02/23/24 0708)  F/VT Ratio<105 (RSBI): (!) 32.33 (02/23/24 0319)    Lines/Drains/Airways       Central Venous Catheter Line  Duration             Trialysis (Dialysis) Catheter 02/11/24 2303 left internal jugular 14 days              Drain  Duration                  Fecal Incontinence  02/22/24 0900 3 days         Gastrostomy/Enterostomy 02/22/24 1200 LUQ 3 days         Open Drain 02/22/24 1414 Tube - 1 Right Thigh Penrose Other (see comments) 3 days              Airway  Duration             Adult Surgical Airway 02/22/24 Shiley Cuffed 8.0 / 85 mm 4 days              Peripheral Intravenous Line  Duration                  Peripheral IV - Single Lumen 02/22/24 0015 22 G Left;Posterior Forearm 4 days         Peripheral IV - Single Lumen 02/22/24 0020 20 G Posterior;Right Forearm 4 days         Peripheral IV - Single Lumen 02/25/24 1601 18 G Left Antecubital <1 day                    Significant  Labs:    CBC/Anemia Profile:  Recent Labs   Lab 02/25/24  0320 02/26/24  0355   WBC 10.72 10.73   HGB 8.0* 7.9*   HCT 25.3* 25.0*    494*   MCV 89 88   RDW 17.7* 17.4*        Chemistries:  Recent Labs   Lab 02/24/24  2141 02/25/24  0320 02/26/24  0355    137  136 136  134*   K 4.0 4.3  4.3 5.0  5.0    101  101 99  100   CO2 25 25  24 23  25   BUN 30* 35*  35* 57*  53*   CREATININE 2.9* 3.4*  3.3* 4.4*  4.4*   CALCIUM 8.7 8.9  8.8 8.9  8.8   ALBUMIN 2.1* 2.1*  2.1* 2.2*  2.2*   PROT  --  7.7 6.9   BILITOT  --  0.3 0.2   ALKPHOS  --  121 151*   ALT  --  16 27   AST  --  35 50*   MG 2.1 2.1  2.1 2.2  2.3   PHOS 2.4* 2.8  2.8 3.5  3.7       Significant Imaging:  I have reviewed all pertinent imaging results/findings within the past 24 hours.  Assessment/Plan:     Renal/  * Ayaz's gangrene    Neuro/Psych:   Acute Encephalopathy  CTH 2/3 with scattered hypoattenuation likely representing age indeterminate infarcts. EEG findings consistent with moderate-severe encephalopathy. MRI 2/6: Several scattered punctate acute infarcts throughout the bilateral frontal lobes, centrum semiovale, basal ganglia, corpus callosum, and cerebellar hemispheres.  CTA 2/6: Atherosclerotic plaquing of the carotid bifurcations and proximal ICAs with less than 50% proximal ICA stenosis by NASCET criteria . Repeat CTH and MRI/MRA unchanged from prior exams. S/p multiple wound vac exchanges. Palliative onboard with family wanting full measures.    Neuro/Psych  Repeat CTH and MRI/MRA stable from initial reads. LP 2/16. Elevated WBCs and protein consistent with bacterial meningitis.  -- Sedation: None  -- Pain: kermit tylenol, dialudid and oxy prn  -- GCS 4T: E2, V1T, M1; exam stable  -- Neurology following; appreciate recs  -- Neurovascular following previously; signed off 2/17  -- Palliative following; GOC conversation 2/7; appreciate recs  -- CSF culture 2/15 with no growth             Cards:   -- HDS  --  goal SBP < 180, MAP >65  -- normotensive; hydralazine and labetalol prns  -- Continue amlodipine 10mg daily; coreg 25mg BID      Pulm:   Acute Respiratory Failure  -- tracheostomy on 2/22.  Tolerating trach collar  -- Goal O2 sat > 90%      Renal:  ALVARADO on CKD  ATN 2/2 septic shock  2/13 [facility] trialysis  -- Reduced UOP since meza out  -- Nephrology following; appreciate recs   -- iHD 2/24  -- On sevelamer  -- Replace lytes as needed.  -- Continue lasix 120mg IV tid   -- Plan to place a permacath for LTAC dialysis-- once we have a set discharge date from ICU to LTACH, interventional IR will place line; appreciate help     Recent Labs   Lab 02/24/24  2141 02/25/24  0320 02/26/24  0355   BUN 30* 35*  35* 57*  53*   CREATININE 2.9* 3.4*  3.3* 4.4*  4.4*        FEN / GI:   -- Daily CMPs  -- NGT in place   -- Replace lytes as needed  -- Nutrition: NPO, TF (Peptamen Intense) at goal  -- GI PPX   -- Nutrition following; appreciate recs  -- Continue psyllium and sevelamer      ID:   Ayaz's gangrene  -- Abx: none  -- ID signed off 2/21  -- Bedside wound vac change today     Heme/Onc:  -- CBC daily  -- Heparin DVT ppx  -- Transfuse if Hgb <7     Endo:   IDDM  Initially presented with DKA   Placed on insulin gtt 2/3 and dced 2/12.    - q4h BG monitoring while NPO  - Detemir 14 units daily  - Novolog 6units q4h while on TFs  - Moderate dose SSI PRN      PPx:   Feeding: TF at goal  Analgesia/Sedation: See above  Thromboembolic prevention: SQH   HOB >30: Y  Stress Ulcer ppx: Famotidine  Glucose control: Goal 140-180 g/dl, kermit detemir and novolog, SSI, Endo following  Bowel reg: psyllium  Invasive Lines/Drains/Airway: trach, [facility] Trialysis, PIV x2      Dispo/Code Status/Palliative:   -- SICU / Full Code   -- Planning transition to LTAC; appreciate social work help  -- Bedside wound vac change today     Robinson Rousseau MD  Critical Care - Surgery  [facility]

## 2024-02-26 NOTE — NURSING
3 hours HD tx completed. 500 mL of fluid removed.    Patient tolerated well.    Blood returned. Catheter locked with NS. Clamped, and capped.     Report given to primary nurse.

## 2024-02-26 NOTE — PROGRESS NOTES
[facility]  General Surgery  Progress Note    Subjective:     History of Present Illness:  53 year old morbidly obese female who does not routinely go to the doctor presents to the ED with malaise, nausea, vomiting, and groin, buttock, perineal swelling and tenderness. She began feeling ill a few days ago.     On arrival to the ED she is tachycardic to 120, hypertensive without fever. Labs demonstrate leukocytosis of 21, , with lactic acidosis and associated ALVARADO with cr 3.8, hyperglycemia with blood glucose of 824 accompanied by severe electrolyte derangements. CT imaging obtained demonstrates diffuse subcutaneous air along buttocks, perineum, anterior vulva, as well as bilateral thighs.     No prior abdominal surgeries. She denies problems with her heart or lungs. Non smoker. Does not routinely take any medications.    Post-Op Info:  Procedure(s) (LRB):  CREATION, TRACHEOSTOMY (N/A)  EGD, WITH PEG TUBE INSERTION (N/A)  CLOSURE, WOUND (Right)  REPLACEMENT, WOUND VAC (Right)   4 Days Post-Op     Interval History: NAEO. No HD yesterday. No significant clinical change. AF, HDS    Medications:  Continuous Infusions:   sodium chloride 0.9%      dextrose 10 % in water (D10W)       Scheduled Meds:   amLODIPine  10 mg Per G Tube Daily    carvediloL  25 mg Per G Tube BID    furosemide (LASIX) injection  120 mg Intravenous TID    heparin (porcine)  7,500 Units Subcutaneous Q8H    insulin aspart U-100  6 Units Subcutaneous Q4H    insulin detemir U-100  14 Units Subcutaneous Daily    psyllium husk (aspartame)  1 packet Per G Tube Daily    sevelamer carbonate  1.6 g Per G Tube TID     PRN Meds:sodium chloride 0.9%, acetaminophen, albuterol-ipratropium, dextrose 10 % in water (D10W), dextrose 10%, dextrose 10%, glucagon (human recombinant), glucose, glucose, hydrALAZINE, insulin aspart U-100, labetalol, magnesium sulfate IVPB, naloxone, ondansetron, oxyCODONE, prochlorperazine, sodium chloride 0.9%,  sodium chloride 0.9%, sodium chloride 0.9%, sodium phosphate 20.01 mmol in dextrose 5 % (D5W) 250 mL IVPB, sodium phosphate 30 mmol in dextrose 5 % (D5W) 250 mL IVPB, sodium phosphate 39.99 mmol in dextrose 5 % (D5W) 250 mL IVPB     Review of patient's allergies indicates:  No Known Allergies  Objective:     Vital Signs (Most Recent):  Temp: 99.8 °F (37.7 °C) (02/26/24 0715)  Pulse: 95 (02/26/24 0715)  Resp: (!) 31 (02/26/24 0715)  BP: (!) 141/64 (02/26/24 0700)  SpO2: 99 % (02/26/24 0715) Vital Signs (24h Range):  Temp:  [98.5 °F (36.9 °C)-99.8 °F (37.7 °C)] 99.8 °F (37.7 °C)  Pulse:  [] 95  Resp:  [16-38] 31  SpO2:  [94 %-100 %] 99 %  BP: (107-166)/() 141/64     Weight: (!) 150.9 kg (332 lb 10.8 oz)  Body mass index is 55.36 kg/m².    Intake/Output - Last 3 Shifts         02/24 0700 02/25 0659 02/25 0700 02/26 0659 02/26 0700 02/27 0659    Other 100      NG/GT 1450 1460     Total Intake(mL/kg) 1550 (10.3) 1460 (9.7)     Urine (mL/kg/hr)       Other 1675 130     Stool  100     Total Output 1675 230     Net -125 +1230                     Physical Exam  Vitals and nursing note reviewed.   Constitutional:       General: She is not in acute distress.     Appearance: She is ill-appearing.   HENT:      Head: Normocephalic and atraumatic.   Eyes:      Comments: Not opening eyes to command    Neck:      Comments: Left IJ Trialysis catheter  Cardiovascular:      Rate and Rhythm: Normal rate and regular rhythm.   Pulmonary:      Effort: Pulmonary effort is normal.      Comments: trached    Abdominal:      General: There is no distension.      Palpations: Abdomen is soft.      Tenderness: There is no abdominal tenderness.      Comments: PEG in place   Genitourinary:     Comments: Glynn in place    Skin:     General: Skin is warm and dry.      Comments: Wound vac in place to R thigh/groin to suction  Re approximated skin clean dry and intact   Neurological:      Comments: Occasionally withdraws from painful  stimuli. No significant purposeful activity           Significant Labs:  I have reviewed all pertinent lab results within the past 24 hours.  CBC:   Recent Labs   Lab 02/26/24  0355   WBC 10.73   RBC 2.84*   HGB 7.9*   HCT 25.0*   *   MCV 88   MCH 27.8   MCHC 31.6*     CMP:   Recent Labs   Lab 02/26/24  0355   *  154*   CALCIUM 8.9  8.8   ALBUMIN 2.2*  2.2*   PROT 6.9     134*   K 5.0  5.0   CO2 23  25   CL 99  100   BUN 57*  53*   CREATININE 4.4*  4.4*   ALKPHOS 151*   ALT 27   AST 50*   BILITOT 0.2       Significant Diagnostics:  I have reviewed all pertinent imaging results/findings within the past 24 hours.  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 Unfortunately, has multiple infarcts on MRI brain with unchanged neuro status, however, family would like to proceed with all measures. S/p trach, peg, and lower wound closure, replacement of wound vac in right groin on 2/22/24. Doing okay and doing better from a respiratory standpoint.    - Vac change today  - may need permacath, possibly midweek, will discuss with staff  - LP 2/16 - NGTD  - ID consulted for urine cultures - recommend broadening to zosyn    - UA growing Burkholderia species and Chryseibacterium Indologenes, recommend IV zosyn with stop date of 2/22  - Palliative following given overall poor prognosis, daughter would like everything done  - Stable on trach collar  - Okay for DVT ppx   - Remainder of care per SICU    Dispo: will need LTAC placement         Chicho Dale MD  General Surgery  [facility]

## 2024-02-26 NOTE — CARE UPDATE
-Glucose Goal 140-180    -A1C:   Hemoglobin A1C   Date Value Ref Range Status   01/30/2024 10.4 (H) 4.0 - 5.6 % Final     Comment:     ADA Screening Guidelines:  5.7-6.4%  Consistent with prediabetes  >or=6.5%  Consistent with diabetes    High levels of fetal hemoglobin interfere with the HbA1C  assay. Heterozygous hemoglobin variants (HbS, HgC, etc)do  not significantly interfere with this assay.   However, presence of multiple variants may affect accuracy.           -HOME REGIMEN:     -GLUCOSE TREND FOR THE PAST 24HRS:   Recent Labs   Lab 02/24/24  1921 02/25/24  0007 02/25/24  0323 02/25/24  0659 02/25/24  1145 02/25/24  1611   POCTGLUCOSE 137* 126* 139* 146* 165* 192*           -NO HYPOGYCEMIAS NOTED     - Diet  Diet NPO    BG goal 140-180  No previous hx of T2DM per family at bedside. A1c of 10.4. DKA at OSH. On TF. BG stable on regimen.          Plan:  - Continue Levemir 14 units daily.  - Continue Novolog 6 units q 4 hrs while on tube feeding (HOLD if tube feeding is stopped or BG < 100)   - Continue Moderate Dose Correction Scale  - BG monitoring q 4 hrs while NPO /TF     ** Please call Endocrine for any BG related issues **

## 2024-02-27 LAB
ALBUMIN SERPL BCP-MCNC: 2.2 G/DL (ref 3.5–5.2)
ALP SERPL-CCNC: 166 U/L (ref 55–135)
ALT SERPL W/O P-5'-P-CCNC: 41 U/L (ref 10–44)
ANION GAP SERPL CALC-SCNC: 12 MMOL/L (ref 8–16)
AST SERPL-CCNC: 70 U/L (ref 10–40)
BASOPHILS # BLD AUTO: 0.07 K/UL (ref 0–0.2)
BASOPHILS NFR BLD: 0.6 % (ref 0–1.9)
BILIRUB SERPL-MCNC: 0.2 MG/DL (ref 0.1–1)
BUN SERPL-MCNC: 42 MG/DL (ref 6–20)
CALCIUM SERPL-MCNC: 9.4 MG/DL (ref 8.7–10.5)
CHLORIDE SERPL-SCNC: 104 MMOL/L (ref 95–110)
CO2 SERPL-SCNC: 20 MMOL/L (ref 23–29)
CREAT SERPL-MCNC: 3.2 MG/DL (ref 0.5–1.4)
DIFFERENTIAL METHOD BLD: ABNORMAL
EOSINOPHIL # BLD AUTO: 0.6 K/UL (ref 0–0.5)
EOSINOPHIL NFR BLD: 5.8 % (ref 0–8)
ERYTHROCYTE [DISTWIDTH] IN BLOOD BY AUTOMATED COUNT: 17.2 % (ref 11.5–14.5)
EST. GFR  (NO RACE VARIABLE): 16.7 ML/MIN/1.73 M^2
GLUCOSE SERPL-MCNC: 155 MG/DL (ref 70–110)
HCT VFR BLD AUTO: 25.9 % (ref 37–48.5)
HGB BLD-MCNC: 7.9 G/DL (ref 12–16)
IMM GRANULOCYTES # BLD AUTO: 0.07 K/UL (ref 0–0.04)
IMM GRANULOCYTES NFR BLD AUTO: 0.6 % (ref 0–0.5)
LYMPHOCYTES # BLD AUTO: 2 K/UL (ref 1–4.8)
LYMPHOCYTES NFR BLD: 18.4 % (ref 18–48)
MAGNESIUM SERPL-MCNC: 2.1 MG/DL (ref 1.6–2.6)
MCH RBC QN AUTO: 27.6 PG (ref 27–31)
MCHC RBC AUTO-ENTMCNC: 30.5 G/DL (ref 32–36)
MCV RBC AUTO: 91 FL (ref 82–98)
MONOCYTES # BLD AUTO: 1.3 K/UL (ref 0.3–1)
MONOCYTES NFR BLD: 12 % (ref 4–15)
NEUTROPHILS # BLD AUTO: 6.9 K/UL (ref 1.8–7.7)
NEUTROPHILS NFR BLD: 62.6 % (ref 38–73)
NRBC BLD-RTO: 0 /100 WBC
PHOSPHATE SERPL-MCNC: 3.6 MG/DL (ref 2.7–4.5)
PLATELET # BLD AUTO: 497 K/UL (ref 150–450)
PMV BLD AUTO: 9.4 FL (ref 9.2–12.9)
POCT GLUCOSE: 145 MG/DL (ref 70–110)
POCT GLUCOSE: 147 MG/DL (ref 70–110)
POCT GLUCOSE: 155 MG/DL (ref 70–110)
POCT GLUCOSE: 157 MG/DL (ref 70–110)
POCT GLUCOSE: 158 MG/DL (ref 70–110)
POCT GLUCOSE: 164 MG/DL (ref 70–110)
POCT GLUCOSE: 167 MG/DL (ref 70–110)
POCT GLUCOSE: 184 MG/DL (ref 70–110)
POTASSIUM SERPL-SCNC: 4.4 MMOL/L (ref 3.5–5.1)
PROT SERPL-MCNC: 7.5 G/DL (ref 6–8.4)
RBC # BLD AUTO: 2.86 M/UL (ref 4–5.4)
SODIUM SERPL-SCNC: 136 MMOL/L (ref 136–145)
WBC # BLD AUTO: 10.95 K/UL (ref 3.9–12.7)

## 2024-02-27 PROCEDURE — 99900026 HC AIRWAY MAINTENANCE (STAT)

## 2024-02-27 PROCEDURE — 63600175 PHARM REV CODE 636 W HCPCS

## 2024-02-27 PROCEDURE — 27200966 HC CLOSED SUCTION SYSTEM

## 2024-02-27 PROCEDURE — 99291 CRITICAL CARE FIRST HOUR: CPT | Mod: 24,,, | Performed by: STUDENT IN AN ORGANIZED HEALTH CARE EDUCATION/TRAINING PROGRAM

## 2024-02-27 PROCEDURE — 25000003 PHARM REV CODE 250

## 2024-02-27 PROCEDURE — 83735 ASSAY OF MAGNESIUM: CPT | Performed by: STUDENT IN AN ORGANIZED HEALTH CARE EDUCATION/TRAINING PROGRAM

## 2024-02-27 PROCEDURE — 84100 ASSAY OF PHOSPHORUS: CPT | Performed by: STUDENT IN AN ORGANIZED HEALTH CARE EDUCATION/TRAINING PROGRAM

## 2024-02-27 PROCEDURE — 80053 COMPREHEN METABOLIC PANEL: CPT

## 2024-02-27 PROCEDURE — 85025 COMPLETE CBC W/AUTO DIFF WBC: CPT

## 2024-02-27 PROCEDURE — 20600001 HC STEP DOWN PRIVATE ROOM

## 2024-02-27 PROCEDURE — 25000242 PHARM REV CODE 250 ALT 637 W/ HCPCS: Performed by: STUDENT IN AN ORGANIZED HEALTH CARE EDUCATION/TRAINING PROGRAM

## 2024-02-27 PROCEDURE — 99900022

## 2024-02-27 PROCEDURE — 27000207 HC ISOLATION

## 2024-02-27 PROCEDURE — 25000003 PHARM REV CODE 250: Performed by: STUDENT IN AN ORGANIZED HEALTH CARE EDUCATION/TRAINING PROGRAM

## 2024-02-27 PROCEDURE — 27000221 HC OXYGEN, UP TO 24 HOURS

## 2024-02-27 PROCEDURE — 99900035 HC TECH TIME PER 15 MIN (STAT)

## 2024-02-27 PROCEDURE — 94761 N-INVAS EAR/PLS OXIMETRY MLT: CPT

## 2024-02-27 RX ORDER — INSULIN ASPART 100 [IU]/ML
0-10 INJECTION, SOLUTION INTRAVENOUS; SUBCUTANEOUS EVERY 4 HOURS PRN
Status: DISCONTINUED | OUTPATIENT
Start: 2024-02-27 | End: 2024-03-05 | Stop reason: HOSPADM

## 2024-02-27 RX ORDER — SODIUM CHLORIDE 9 MG/ML
INJECTION, SOLUTION INTRAVENOUS ONCE
Status: DISCONTINUED | OUTPATIENT
Start: 2024-02-27 | End: 2024-03-04

## 2024-02-27 RX ORDER — HEPARIN SODIUM 1000 [USP'U]/ML
1000 INJECTION, SOLUTION INTRAVENOUS; SUBCUTANEOUS
Status: DISPENSED | OUTPATIENT
Start: 2024-02-27 | End: 2024-02-28

## 2024-02-27 RX ADMIN — INSULIN ASPART 6 UNITS: 100 INJECTION, SOLUTION INTRAVENOUS; SUBCUTANEOUS at 11:02

## 2024-02-27 RX ADMIN — FUROSEMIDE 120 MG: 10 INJECTION, SOLUTION INTRAVENOUS at 02:02

## 2024-02-27 RX ADMIN — SEVELAMER CARBONATE 1.6 G: 800 POWDER, FOR SUSPENSION ORAL at 09:02

## 2024-02-27 RX ADMIN — INSULIN DETEMIR 14 UNITS: 100 INJECTION, SOLUTION SUBCUTANEOUS at 08:02

## 2024-02-27 RX ADMIN — SEVELAMER CARBONATE 1.6 G: 800 POWDER, FOR SUSPENSION ORAL at 02:02

## 2024-02-27 RX ADMIN — SEVELAMER CARBONATE 1.6 G: 800 POWDER, FOR SUSPENSION ORAL at 08:02

## 2024-02-27 RX ADMIN — HEPARIN SODIUM 7500 UNITS: 5000 INJECTION INTRAVENOUS; SUBCUTANEOUS at 02:02

## 2024-02-27 RX ADMIN — CARVEDILOL 25 MG: 25 TABLET, FILM COATED ORAL at 09:02

## 2024-02-27 RX ADMIN — CARVEDILOL 25 MG: 25 TABLET, FILM COATED ORAL at 08:02

## 2024-02-27 RX ADMIN — HEPARIN SODIUM 7500 UNITS: 5000 INJECTION INTRAVENOUS; SUBCUTANEOUS at 09:02

## 2024-02-27 RX ADMIN — INSULIN ASPART 6 UNITS: 100 INJECTION, SOLUTION INTRAVENOUS; SUBCUTANEOUS at 07:02

## 2024-02-27 RX ADMIN — FUROSEMIDE 120 MG: 10 INJECTION, SOLUTION INTRAVENOUS at 09:02

## 2024-02-27 RX ADMIN — HEPARIN SODIUM 7500 UNITS: 5000 INJECTION INTRAVENOUS; SUBCUTANEOUS at 05:02

## 2024-02-27 RX ADMIN — INSULIN ASPART 6 UNITS: 100 INJECTION, SOLUTION INTRAVENOUS; SUBCUTANEOUS at 04:02

## 2024-02-27 RX ADMIN — AMLODIPINE BESYLATE 10 MG: 10 TABLET ORAL at 08:02

## 2024-02-27 RX ADMIN — INSULIN ASPART 2 UNITS: 100 INJECTION, SOLUTION INTRAVENOUS; SUBCUTANEOUS at 04:02

## 2024-02-27 RX ADMIN — FUROSEMIDE 120 MG: 10 INJECTION, SOLUTION INTRAVENOUS at 08:02

## 2024-02-27 RX ADMIN — INSULIN ASPART 1 UNITS: 100 INJECTION, SOLUTION INTRAVENOUS; SUBCUTANEOUS at 12:02

## 2024-02-27 RX ADMIN — INSULIN ASPART 6 UNITS: 100 INJECTION, SOLUTION INTRAVENOUS; SUBCUTANEOUS at 12:02

## 2024-02-27 RX ADMIN — PSYLLIUM HUSK 1 PACKET: 3.4 POWDER ORAL at 08:02

## 2024-02-27 RX ADMIN — INSULIN ASPART 2 UNITS: 100 INJECTION, SOLUTION INTRAVENOUS; SUBCUTANEOUS at 07:02

## 2024-02-27 NOTE — ASSESSMENT & PLAN NOTE
Neuro/Psych:   Acute Encephalopathy  CTH 2/3 with scattered hypoattenuation likely representing age indeterminate infarcts. EEG findings consistent with moderate-severe encephalopathy. MRI 2/6: Several scattered punctate acute infarcts throughout the bilateral frontal lobes, centrum semiovale, basal ganglia, corpus callosum, and cerebellar hemispheres.  CTA 2/6: Atherosclerotic plaquing of the carotid bifurcations and proximal ICAs with less than 50% proximal ICA stenosis by NASCET criteria . Repeat CTH and MRI/MRA unchanged from prior exams. S/p multiple wound vac exchanges. Palliative onboard with family wanting full measures.    Neuro/Psych  Repeat CTH and MRI/MRA stable from initial reads. LP 2/16. Elevated WBCs and protein consistent with bacterial meningitis.  -- Sedation: None  -- Pain: kermit tylenol, dialudid and oxy prn  -- GCS 6T: E2, V1T, M3;   -- Neurology following; appreciate recs  -- Neurovascular following previously; signed off 2/17  -- Palliative following; GOC conversation 2/7; appreciate recs  -- CSF culture 2/15 with no growth             Cards:   -- HDS  -- goal SBP < 180, MAP >65  -- normotensive; hydralazine and labetalol prns  -- Continue amlodipine 10mg daily; coreg 25mg BID      Pulm:   Acute Respiratory Failure  -- tracheostomy on 2/22.  Tolerating trach collar  -- Goal O2 sat > 90%      Renal:  ALVARADO on CKD  ATN 2/2 septic shock  2/13 [facility] trialysis  -- Reduced UOP since meza out  -- Nephrology following; appreciate recs   -- iHD 2/26  -- On sevelamer  -- Replace lytes as needed.  -- Continue lasix 120mg IV tid   -- Plan to place a permacath for LTAC dialysis. ID consulted    Recent Labs   Lab 02/25/24  0320 02/26/24  0355 02/27/24  0457   BUN 35*  35* 57*  53* 42*   CREATININE 3.4*  3.3* 4.4*  4.4* 3.2*        FEN / GI:   -- Daily CMPs  -- NGT in place   -- Replace lytes as needed  -- Nutrition: NPO, TF (Peptamen Intense) at goal  -- GI PPX   -- Nutrition following; appreciate  recs  -- Continue psyllium and sevelamer      ID:   Ayaz's gangrene  -- multiple wound vac changes  -- ID signed off 2/21     Heme/Onc:  -- CBC daily  -- Heparin DVT ppx  -- Transfuse if Hgb <7     Endo:   IDDM  Initially presented with DKA   Placed on insulin gtt 2/3 and dced 2/12.    - q4h BG monitoring while NPO  - Detemir 14 units daily  - Novolog 6units q4h while on TFs  - Moderate dose SSI PRN      PPx:   Feeding: TF at goal  Analgesia/Sedation: See above  Thromboembolic prevention: SQH   HOB >30: Y  Stress Ulcer ppx: Famotidine  Glucose control: Goal 140-180 g/dl, kermit detemir and novolog, SSI, Endo following  Bowel reg: psyllium  Invasive Lines/Drains/Airway: YASMINE howell Trialysis, PIV x2      Dispo/Code Status/Palliative:   -- SICU / Full Code   -- Planning transition to LTAC; appreciate social work help  -- Stable for SD

## 2024-02-27 NOTE — SUBJECTIVE & OBJECTIVE
Interval History: No issues overnight. Wound vac changed yesterday. HD overnight, 3 hrs completed with 500 ml fluid removed. H/H stable, WBC 10.     Medications:  Continuous Infusions:   dextrose 10 % in water (D10W)       Scheduled Meds:   amLODIPine  10 mg Per G Tube Daily    carvediloL  25 mg Per G Tube BID    furosemide (LASIX) injection  120 mg Intravenous TID    heparin (porcine)  7,500 Units Subcutaneous Q8H    insulin aspart U-100  6 Units Subcutaneous Q4H    insulin detemir U-100  14 Units Subcutaneous Daily    psyllium husk (aspartame)  1 packet Per G Tube Daily    sevelamer carbonate  1.6 g Per G Tube TID     PRN Meds:sodium chloride 0.9%, acetaminophen, albuterol-ipratropium, dextrose 10 % in water (D10W), dextrose 10%, dextrose 10%, glucagon (human recombinant), glucose, glucose, hydrALAZINE, insulin aspart U-100, labetalol, naloxone, ondansetron, prochlorperazine, sodium chloride 0.9%, sodium chloride 0.9%, sodium chloride 0.9%     Review of patient's allergies indicates:  No Known Allergies  Objective:     Vital Signs (Most Recent):  Temp: 98.7 °F (37.1 °C) (02/27/24 0400)  Pulse: 97 (02/27/24 0645)  Resp: (!) 29 (02/27/24 0645)  BP: (!) 159/77 (02/27/24 0645)  SpO2: 97 % (02/27/24 0645) Vital Signs (24h Range):  Temp:  [98.5 °F (36.9 °C)-99.1 °F (37.3 °C)] 98.7 °F (37.1 °C)  Pulse:  [] 97  Resp:  [11-37] 29  SpO2:  [94 %-99 %] 97 %  BP: (113-186)/(57-94) 159/77     Weight: (!) 140.2 kg (309 lb)  Body mass index is 51.42 kg/m².    Intake/Output - Last 3 Shifts         02/25 0700 02/26 0659 02/26 0700 02/27 0659 02/27 0700 02/28 0659    Other       NG/GT 1460 1170     Total Intake(mL/kg) 1460 (9.7) 1170 (8.3)     Urine (mL/kg/hr)  0 (0)     Other 130 1125     Stool 100 0     Total Output 230 1125     Net +1230 +45            Urine Occurrence  2 x     Stool Occurrence  1 x              Physical Exam  Vitals and nursing note reviewed.   Constitutional:       General: She is not in acute  distress.     Appearance: She is ill-appearing.   HENT:      Head: Normocephalic and atraumatic.   Eyes:      Comments: Not opening eyes to command    Neck:      Comments: Left IJ Trialysis catheter  Cardiovascular:      Rate and Rhythm: Normal rate and regular rhythm.   Pulmonary:      Effort: Pulmonary effort is normal.      Comments: trached    Abdominal:      General: There is no distension.      Palpations: Abdomen is soft.      Tenderness: There is no abdominal tenderness.      Comments: PEG in place   Genitourinary:     Comments: Glynn in place    Skin:     General: Skin is warm and dry.      Comments: Wound vac in place to R thigh/groin to suction  Re approximated skin clean dry and intact   Neurological:      Comments: Occasionally withdraws from painful stimuli. No significant purposeful activity           Significant Labs:  I have reviewed all pertinent lab results within the past 24 hours.  CBC:   Recent Labs   Lab 02/27/24 0457   WBC 10.95   RBC 2.86*   HGB 7.9*   HCT 25.9*   *   MCV 91   MCH 27.6   MCHC 30.5*       CMP:   Recent Labs   Lab 02/27/24 0457   *   CALCIUM 9.4   ALBUMIN 2.2*   PROT 7.5      K 4.4   CO2 20*      BUN 42*   CREATININE 3.2*   ALKPHOS 166*   ALT 41   AST 70*   BILITOT 0.2         Significant Diagnostics:  I have reviewed all pertinent imaging results/findings within the past 24 hours.

## 2024-02-27 NOTE — ASSESSMENT & PLAN NOTE
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 Unfortunately, has multiple infarcts on MRI brain with unchanged neuro status, however, family would like to proceed with all measures. S/p trach, peg, and lower wound closure, replacement of wound vac in right groin on 2/22/24.     - Wound vac changed 2/26. Will consult wound care for additional WV changes, next to be done on 2/28  - HD yesterday. Will likely need permacath, will discuss timing with staff  - LP 2/16 - NGTD  - ID consulted for urine cultures -  zosyn    - UA growing Burkholderia species and Chryseibacterium Indologenes, recommend IV zosyn with stop date of 2/22  - Palliative following, full code  - Stable on trach collar  - Okay for DVT ppx   - Remainder of care per SICU    Dispo: LTAC placement

## 2024-02-27 NOTE — CLINICAL REVIEW
Nephrology Chart Review      Intake/Output Summary (Last 24 hours) at 2/27/2024 0956  Last data filed at 2/27/2024 0837  Gross per 24 hour   Intake 1110 ml   Output 1125 ml   Net -15 ml       Vitals:    02/27/24 0827 02/27/24 0830 02/27/24 0845 02/27/24 0900   BP: (!) 161/79 (!) 162/77  (!) 148/70   BP Location:       Patient Position:       Pulse: 92 93 94 91   Resp: (!) 28 (!) 27 (!) 27 (!) 29   Temp:       TempSrc:       SpO2: 96% 96% 97% (!) 94%   Weight:       Height:           Recent Labs   Lab 02/25/24  0320 02/26/24  0355 02/27/24  0457     136 136  134* 136   K 4.3  4.3 5.0  5.0 4.4     101 99  100 104   CO2 25  24 23  25 20*   BUN 35*  35* 57*  53* 42*   CREATININE 3.4*  3.3* 4.4*  4.4* 3.2*   CALCIUM 8.9  8.8 8.9  8.8 9.4   PHOS 2.8  2.8 3.5  3.7 3.6     S/p iHD yesterday, tolerated well without issues.   UOP still not documented  Can continue lasix if UOP is responding. If not okay to DC this medication.     Tentative plan for iHD tomorrow.     No other related issues identified. Please call as needed; We will continue to follow.    Antione Hazel

## 2024-02-27 NOTE — PROGRESS NOTES
[facility] - [facility] Int[facility]  Critical Care - Surgery  Progress Note    Patient Name: Sarah Saravia  MRN: [id number]  Admission Date: 1/29/2024  Hospital Length of Stay: 29 days  Code Status: Full Code  Attending Provider: Steve Cole MD  Primary Care Provider: [facility]   Principal Problem: Ayaz's gangrene    Subjective:     Hospital/ICU Course:  No notes on file    Interval History/Significant Events: Pt seen and examined at bedside. BISHNU MULLER. 2nd HD session yesterday.       Follow-up For: Procedure(s) (LRB):  CREATION, TRACHEOSTOMY (N/A)  EGD, WITH PEG TUBE INSERTION (N/A)  CLOSURE, WOUND (Right)  REPLACEMENT, WOUND VAC (Right)    Post-Operative Day: 5 Days Post-Op    Objective:     Vital Signs (Most Recent):  Temp: 98.7 °F (37.1 °C) (02/27/24 0400)  Pulse: 96 (02/27/24 0600)  Resp: (!) 31 (02/27/24 0600)  BP: (!) 160/83 (02/27/24 0600)  SpO2: 97 % (02/27/24 0600) Vital Signs (24h Range):  Temp:  [98.5 °F (36.9 °C)-99.8 °F (37.7 °C)] 98.7 °F (37.1 °C)  Pulse:  [] 96  Resp:  [11-37] 31  SpO2:  [94 %-99 %] 97 %  BP: (113-186)/(57-94) 160/83     Weight: (!) 140.2 kg (309 lb)  Body mass index is 51.42 kg/m².      Intake/Output Summary (Last 24 hours) at 2/27/2024 0619  Last data filed at 2/27/2024 0000  Gross per 24 hour   Intake 1170 ml   Output 1125 ml   Net 45 ml          Physical Exam     Vitals and nursing note reviewed.   Constitutional:       General: She is not in acute distress.     Appearance: She is ill-appearing.   HENT:      Head: Normocephalic and atraumatic.   Eyes:      Extraocular Movements: Extraocular movements intact.      Conjunctiva/sclera: Conjunctivae normal.   Neck:      Comments: Left IJ Trialysis catheter  Cardiovascular:      Rate and Rhythm: Normal rate and regular rhythm.   Pulmonary:      Effort: Pulmonary effort is normal.      Comments: tracheostomy  Abdominal:      General: There is no distension.      Palpations: Abdomen is soft.       Tenderness: There is no abdominal tenderness.   Genitourinary:     Comments: fecal incont manager  Skin:     General: Skin is warm and dry.      Comments: Wound vac in place to groin to suction, wound size 10cm x 5cm  Neurological:      General: No focal deficit present.      Mental Status: She is oriented to person, place, and time.   Psychiatric:         Mood and Affect: Mood normal.         Behavior: Behavior normal.       Lines/Drains/Airways       Central Venous Catheter Line  Duration             Trialysis (Dialysis) Catheter 02/11/24 2303 left internal jugular 15 days              Drain  Duration                  Fecal Incontinence  02/22/24 0900 4 days         Gastrostomy/Enterostomy 02/22/24 1200 LUQ 4 days         Open Drain 02/22/24 1414 Tube - 1 Right Thigh Penrose Other (see comments) 4 days              Airway  Duration             Adult Surgical Airway 02/22/24 Shiley Cuffed 8.0 / 85 mm 5 days              Peripheral Intravenous Line  Duration                  Peripheral IV - Single Lumen 02/25/24 1601 18 G Left Antecubital 1 day                    Significant Labs:    CBC/Anemia Profile:  Recent Labs   Lab 02/26/24  0355 02/27/24  0457   WBC 10.73 10.95   HGB 7.9* 7.9*   HCT 25.0* 25.9*   * 497*   MCV 88 91   RDW 17.4* 17.2*        Chemistries:  Recent Labs   Lab 02/26/24  0355 02/27/24  0457     134* 136   K 5.0  5.0 4.4   CL 99  100 104   CO2 23  25 20*   BUN 57*  53* 42*   CREATININE 4.4*  4.4* 3.2*   CALCIUM 8.9  8.8 9.4   ALBUMIN 2.2*  2.2* 2.2*   PROT 6.9 7.5   BILITOT 0.2 0.2   ALKPHOS 151* 166*   ALT 27 41   AST 50* 70*   MG 2.2  2.3 2.1   PHOS 3.5  3.7 3.6       Significant Imaging:  I have reviewed all pertinent imaging results/findings within the past 24 hours.  Assessment/Plan:     Renal/  * Ayaz's gangrene    Neuro/Psych:   Acute Encephalopathy  CTH 2/3 with scattered hypoattenuation likely representing age indeterminate infarcts. EEG findings  consistent with moderate-severe encephalopathy. MRI 2/6: Several scattered punctate acute infarcts throughout the bilateral frontal lobes, centrum semiovale, basal ganglia, corpus callosum, and cerebellar hemispheres.  CTA 2/6: Atherosclerotic plaquing of the carotid bifurcations and proximal ICAs with less than 50% proximal ICA stenosis by NASCET criteria . Repeat CTH and MRI/MRA unchanged from prior exams. S/p multiple wound vac exchanges. Palliative onboard with family wanting full measures.    Neuro/Psych  Repeat CTH and MRI/MRA stable from initial reads. LP 2/16. Elevated WBCs and protein consistent with bacterial meningitis.  -- Sedation: None  -- Pain: kermit tylenol, dialudid and oxy prn  -- GCS 6T: E2, V1T, M3;   -- Neurovascular following previously; signed off 2/17  -- Palliative following; GOC conversation 2/7; appreciate recs  -- CSF culture 2/15 with no growth             Cards:   -- HDS  -- goal SBP < 180, MAP >65  -- hydralazine and labetalol prns  -- Continue amlodipine 10mg daily; coreg 25mg BID      Pulm:   Acute Respiratory Failure  -- tracheostomy on 2/22.  Tolerating trach collar  -- Goal O2 sat > 90%      Renal:  ALVARADO on CKD  ATN 2/2 septic shock  2/13 LIJ trialysis  -- Reduced UOP since meza out  -- Nephrology following; appreciate recs   -- iHD 2/26  -- On sevelamer  -- Replace lytes as needed.  -- Continue lasix 120mg IV tid   -- Plan to place a permacath for LTAC dialysis. IR consulted    Recent Labs   Lab 02/25/24  0320 02/26/24  0355 02/27/24  0457   BUN 35*  35* 57*  53* 42*   CREATININE 3.4*  3.3* 4.4*  4.4* 3.2*        FEN / GI:   -- Daily CMPs  -- NGT in place   -- Replace lytes as needed  -- Nutrition: NPO, TF (Peptamen Intense) at goal  -- GI PPX   -- Nutrition following; appreciate recs  -- Continue psyllium and sevelamer      ID:   Ayaz's gangrene  -- multiple wound vac changes  -- ID signed off 2/21     Heme/Onc:  -- CBC daily  -- Heparin DVT ppx  -- Transfuse if Hgb <7      Endo:   IDDM  Initially presented with DKA   Placed on insulin gtt 2/3 and dced 2/12.    - q4h BG monitoring while NPO  - Detemir 14 units daily  - Novolog 6units q4h while on TFs  - Moderate dose SSI PRN      PPx:   Feeding: TF at goal  Analgesia/Sedation: See above  Thromboembolic prevention: SQH   HOB >30: Y  Stress Ulcer ppx:none  Glucose control: Goal 140-180 g/dl, kermit detemir and novolog, SSI, Endo following  Bowel reg: psyllium  Invasive Lines/Drains/Airway: trach, LIJ Trialysis, PIV x2      Dispo/Code Status/Palliative:   -- SICU / Full Code   -- Planning transition to LTAC; appreciate social work help  -- Stable for SD         Robinson Rousseau MD  Critical Care - Surgery  [facility] - [facility]

## 2024-02-27 NOTE — CARE UPDATE
-Glucose Goal 140-180    -A1C:   Hemoglobin A1C   Date Value Ref Range Status   01/30/2024 10.4 (H) 4.0 - 5.6 % Final     Comment:     ADA Screening Guidelines:  5.7-6.4%  Consistent with prediabetes  >or=6.5%  Consistent with diabetes    High levels of fetal hemoglobin interfere with the HbA1C  assay. Heterozygous hemoglobin variants (HbS, HgC, etc)do  not significantly interfere with this assay.   However, presence of multiple variants may affect accuracy.           -HOME REGIMEN:     -GLUCOSE TREND FOR THE PAST 24HRS:   Recent Labs   Lab 02/26/24  1145 02/26/24  1614 02/26/24  2007 02/27/24  0049 02/27/24  0500 02/27/24  0738   POCTGLUCOSE 171* 138* 155* 164* 145* 157*           -NO HYPOGYCEMIAS NOTED     - Diet  Diet NPO  Diet NPO    BG goal 140-180  No previous hx of T2DM per family at bedside. A1c of 10.4. DKA at OSH. On TF. BG stable on regimen.          Plan:  - Continue Levemir 14 units daily.  - Continue Novolog 6 units q 4 hrs while on tube feeding (HOLD if tube feeding is stopped or BG < 100)   - Continue Moderate Dose Correction Scale  - BG monitoring q 4 hrs while NPO /TF     ** Please call Endocrine for any BG related issues **

## 2024-02-27 NOTE — ASSESSMENT & PLAN NOTE
Neuro/Psych:   Acute Encephalopathy  CTH 2/3 with scattered hypoattenuation likely representing age indeterminate infarcts. EEG findings consistent with moderate-severe encephalopathy. MRI 2/6: Several scattered punctate acute infarcts throughout the bilateral frontal lobes, centrum semiovale, basal ganglia, corpus callosum, and cerebellar hemispheres.  CTA 2/6: Atherosclerotic plaquing of the carotid bifurcations and proximal ICAs with less than 50% proximal ICA stenosis by NASCET criteria . Repeat CTH and MRI/MRA unchanged from prior exams. S/p multiple wound vac exchanges. Palliative onboard with family wanting full measures.  - Repeat CTH and MRI/MRA stable from initial reads. LP 2/16. Elevated WBCs and protein consistent with bacterial meningitis.  - CSF culture 2/19 with no growth  - Neuro and vascular neurology previously following and signed off  -- Sedation: None  -- Pain: kermit tylenol, dialudid and oxy prn  -- GCS 6T: E2, V1T, M3;              Cards:   -- HDS. 110-140s systolics 2/28 not requiring prns  -- Goal SBP < 180, MAP >65  -- Hydralazine and labetalol prns  -- Continue amlodipine 10mg daily; coreg 25mg BID      Pulm:   Acute Respiratory Failure  -- s/p tracheostomy on 2/22. Tolerating trach collar  -- Goal O2 sat > 90%      Renal:  ALVARADO on CKD, ATN 2/2 septic shock   -- L IJ trialysis placed 2/13 for CRRT    -- Has tolerated 2 HD sessions, 3x planned today  -- Plan for tunneled line 2/29  -- Nephrology following; appreciate recs  -- On sevelamer  -- Replace lytes as needed.  -- Consider stopping lasix 120mg IV tid     Recent Labs   Lab 02/25/24  0320 02/26/24  0355 02/27/24  0457   BUN 35*  35* 57*  53* 42*   CREATININE 3.4*  3.3* 4.4*  4.4* 3.2*        FEN / GI:   -- Daily CMPs  -- NGT in place   -- Replace lytes as needed  -- Nutrition: NPO, TF (Peptamen Intense) at goal  -- Nutrition following; appreciate recs  -- Continue psyllium and sevelamer      ID:   Ayaz's gangrene  -- multiple  wound vac changes, next planned w/ wound care 2/29  -- Completed abx course  -- ID signed off 2/21     Heme/Onc:  -- CBC daily  -- Heparin DVT ppx  -- Transfuse if Hgb <7     Endo:   IDDM  Initially presented with DKA    - q4h BG monitoring while NPO  - Detemir 14 units daily  - Novolog 6units q4h while on TFs  - Moderate dose SSI PRN      PPx:   Feeding: TF at goal  Analgesia/Sedation: See above  Thromboembolic prevention: SQH   HOB >30: Y  Stress Ulcer ppx: not indicated  Glucose control: Goal 140-180 g/dl, kermit detemir and novolog, SSI, Endo following  Bowel reg: psyllium  Invasive Lines/Drains/Airway: YASMINE howell Trialysis, PIV x2      Dispo/Code Status/Palliative:   -- SICU / Full Code   -- Planning transition to LTAC; appreciate social work help  -- Stable for SD

## 2024-02-27 NOTE — PLAN OF CARE
Patient has been declined for admission [facility] CM spoke with Kathe Adan @ [facility] asked her to review for admission [facility] is in network with the patients insurance SW with the patients daughter that this was acceptable   CM WCLIZA  CM met with the tem to discuss perma cath placement needed prior to transfer to [facility] Team informed CM that the patients line will be placed by IR either Thursday or Friday   CM informed Kathe (liaison from [facility] of the above  CM will continue to monitor

## 2024-02-27 NOTE — CONSULTS
Wound care consult received for wound vac dressing changes. General surgery changed the wound vac dressing on 2/26. Wound care will follow-up on 2/29 for the next dressing change.

## 2024-02-27 NOTE — SUBJECTIVE & OBJECTIVE
Interval History/Significant Events: Pt seen and examined at bedside. BISHNU HAYESSIDRAON. 2nd HD session yesterday.       Follow-up For: Procedure(s) (LRB):  CREATION, TRACHEOSTOMY (N/A)  EGD, WITH PEG TUBE INSERTION (N/A)  CLOSURE, WOUND (Right)  REPLACEMENT, WOUND VAC (Right)    Post-Operative Day: 5 Days Post-Op    Objective:     Vital Signs (Most Recent):  Temp: 98.7 °F (37.1 °C) (02/27/24 0400)  Pulse: 96 (02/27/24 0600)  Resp: (!) 31 (02/27/24 0600)  BP: (!) 160/83 (02/27/24 0600)  SpO2: 97 % (02/27/24 0600) Vital Signs (24h Range):  Temp:  [98.5 °F (36.9 °C)-99.8 °F (37.7 °C)] 98.7 °F (37.1 °C)  Pulse:  [] 96  Resp:  [11-37] 31  SpO2:  [94 %-99 %] 97 %  BP: (113-186)/(57-94) 160/83     Weight: (!) 140.2 kg (309 lb)  Body mass index is 51.42 kg/m².      Intake/Output Summary (Last 24 hours) at 2/27/2024 0619  Last data filed at 2/27/2024 0000  Gross per 24 hour   Intake 1170 ml   Output 1125 ml   Net 45 ml          Physical Exam     Vitals and nursing note reviewed.   Constitutional:       General: She is not in acute distress.     Appearance: She is ill-appearing.   HENT:      Head: Normocephalic and atraumatic.   Eyes:      Extraocular Movements: Extraocular movements intact.      Conjunctiva/sclera: Conjunctivae normal.   Neck:      Comments: Left IJ Trialysis catheter  Cardiovascular:      Rate and Rhythm: Normal rate and regular rhythm.   Pulmonary:      Effort: Pulmonary effort is normal.      Comments: tracheostomy  Abdominal:      General: There is no distension.      Palpations: Abdomen is soft.      Tenderness: There is no abdominal tenderness.   Genitourinary:     Comments: fecal incont manager  Skin:     General: Skin is warm and dry.      Comments: Wound vac in place to groin to suction, wound size 10cm x 5cm  Neurological:      General: No focal deficit present.      Mental Status: She is oriented to person, place, and time.   Psychiatric:         Mood and Affect: Mood normal.         Behavior:  Behavior normal.       Lines/Drains/Airways       Central Venous Catheter Line  Duration             Trialysis (Dialysis) Catheter 02/11/24 2303 left internal jugular 15 days              Drain  Duration                  Fecal Incontinence  02/22/24 0900 4 days         Gastrostomy/Enterostomy 02/22/24 1200 LUQ 4 days         Open Drain 02/22/24 1414 Tube - 1 Right Thigh Penrose Other (see comments) 4 days              Airway  Duration             Adult Surgical Airway 02/22/24 Shiley Cuffed 8.0 / 85 mm 5 days              Peripheral Intravenous Line  Duration                  Peripheral IV - Single Lumen 02/25/24 1601 18 G Left Antecubital 1 day                    Significant Labs:    CBC/Anemia Profile:  Recent Labs   Lab 02/26/24  0355 02/27/24  0457   WBC 10.73 10.95   HGB 7.9* 7.9*   HCT 25.0* 25.9*   * 497*   MCV 88 91   RDW 17.4* 17.2*        Chemistries:  Recent Labs   Lab 02/26/24  0355 02/27/24  0457     134* 136   K 5.0  5.0 4.4   CL 99  100 104   CO2 23  25 20*   BUN 57*  53* 42*   CREATININE 4.4*  4.4* 3.2*   CALCIUM 8.9  8.8 9.4   ALBUMIN 2.2*  2.2* 2.2*   PROT 6.9 7.5   BILITOT 0.2 0.2   ALKPHOS 151* 166*   ALT 27 41   AST 50* 70*   MG 2.2  2.3 2.1   PHOS 3.5  3.7 3.6       Significant Imaging:  I have reviewed all pertinent imaging results/findings within the past 24 hours.

## 2024-02-27 NOTE — PLAN OF CARE
02/27/24 1014   Post-Acute Status   Post-Acute Authorization Placement  (LTACH)   Post-Acute Placement Status Referrals Sent     [facility] -declined       The SW contacted the patient's daughter to follow up to discuss the [facility] denial for this patient. Spoke with patient's daughter to review discharge recommendation of  LTACH and is agreeable to plan    Patient/family provided list of facilities in-network with patient's payor plan. Providers that are owned, operated, or affiliated with [facility] are included on the list.     Notified that referral sent to below listed facilities from in-network list based on proximity to home/family support:   [facility]  Patient/family instructed to identify preference.    Preferred Facility: (if more than 1, listed in order of descending preference)  [facility]    If an additional preferred facility not listed above is identified, additional referral to be sent. If above facilities unable to accept, will send additional referrals to in-network providers.       Jahaira Mckeon LMSW  Case Management [facility]

## 2024-02-28 LAB
ALBUMIN SERPL BCP-MCNC: 2.3 G/DL (ref 3.5–5.2)
ALP SERPL-CCNC: 156 U/L (ref 55–135)
ALT SERPL W/O P-5'-P-CCNC: 41 U/L (ref 10–44)
ANION GAP SERPL CALC-SCNC: 13 MMOL/L (ref 8–16)
AST SERPL-CCNC: 56 U/L (ref 10–40)
BACTERIA SPEC ANAEROBE CULT: NORMAL
BASOPHILS # BLD AUTO: 0.07 K/UL (ref 0–0.2)
BASOPHILS NFR BLD: 0.6 % (ref 0–1.9)
BILIRUB SERPL-MCNC: 0.2 MG/DL (ref 0.1–1)
BUN SERPL-MCNC: 58 MG/DL (ref 6–20)
CALCIUM SERPL-MCNC: 9.5 MG/DL (ref 8.7–10.5)
CHLORIDE SERPL-SCNC: 102 MMOL/L (ref 95–110)
CO2 SERPL-SCNC: 21 MMOL/L (ref 23–29)
CREAT SERPL-MCNC: 4.1 MG/DL (ref 0.5–1.4)
DIFFERENTIAL METHOD BLD: ABNORMAL
EOSINOPHIL # BLD AUTO: 0.6 K/UL (ref 0–0.5)
EOSINOPHIL NFR BLD: 5.4 % (ref 0–8)
ERYTHROCYTE [DISTWIDTH] IN BLOOD BY AUTOMATED COUNT: 17.2 % (ref 11.5–14.5)
EST. GFR  (NO RACE VARIABLE): 12.4 ML/MIN/1.73 M^2
GLUCOSE SERPL-MCNC: 162 MG/DL (ref 70–110)
HCT VFR BLD AUTO: 25.8 % (ref 37–48.5)
HGB BLD-MCNC: 8 G/DL (ref 12–16)
IMM GRANULOCYTES # BLD AUTO: 0.08 K/UL (ref 0–0.04)
IMM GRANULOCYTES NFR BLD AUTO: 0.7 % (ref 0–0.5)
LYMPHOCYTES # BLD AUTO: 2.3 K/UL (ref 1–4.8)
LYMPHOCYTES NFR BLD: 19.1 % (ref 18–48)
MAGNESIUM SERPL-MCNC: 2.1 MG/DL (ref 1.6–2.6)
MCH RBC QN AUTO: 28 PG (ref 27–31)
MCHC RBC AUTO-ENTMCNC: 31 G/DL (ref 32–36)
MCV RBC AUTO: 90 FL (ref 82–98)
MONOCYTES # BLD AUTO: 1.3 K/UL (ref 0.3–1)
MONOCYTES NFR BLD: 11.2 % (ref 4–15)
NEUTROPHILS # BLD AUTO: 7.5 K/UL (ref 1.8–7.7)
NEUTROPHILS NFR BLD: 63 % (ref 38–73)
NRBC BLD-RTO: 0 /100 WBC
PHOSPHATE SERPL-MCNC: 4.7 MG/DL (ref 2.7–4.5)
PLATELET # BLD AUTO: 554 K/UL (ref 150–450)
PMV BLD AUTO: 9.8 FL (ref 9.2–12.9)
POCT GLUCOSE: 155 MG/DL (ref 70–110)
POCT GLUCOSE: 159 MG/DL (ref 70–110)
POCT GLUCOSE: 167 MG/DL (ref 70–110)
POCT GLUCOSE: 177 MG/DL (ref 70–110)
POCT GLUCOSE: 178 MG/DL (ref 70–110)
POCT GLUCOSE: 183 MG/DL (ref 70–110)
POTASSIUM SERPL-SCNC: 4.6 MMOL/L (ref 3.5–5.1)
PROT SERPL-MCNC: 7.7 G/DL (ref 6–8.4)
RBC # BLD AUTO: 2.86 M/UL (ref 4–5.4)
SODIUM SERPL-SCNC: 136 MMOL/L (ref 136–145)
WBC # BLD AUTO: 11.94 K/UL (ref 3.9–12.7)

## 2024-02-28 PROCEDURE — 83735 ASSAY OF MAGNESIUM: CPT

## 2024-02-28 PROCEDURE — 84100 ASSAY OF PHOSPHORUS: CPT

## 2024-02-28 PROCEDURE — 63600175 PHARM REV CODE 636 W HCPCS

## 2024-02-28 PROCEDURE — 99900026 HC AIRWAY MAINTENANCE (STAT)

## 2024-02-28 PROCEDURE — 94761 N-INVAS EAR/PLS OXIMETRY MLT: CPT

## 2024-02-28 PROCEDURE — 99233 SBSQ HOSP IP/OBS HIGH 50: CPT | Mod: 95,,, | Performed by: STUDENT IN AN ORGANIZED HEALTH CARE EDUCATION/TRAINING PROGRAM

## 2024-02-28 PROCEDURE — 99291 CRITICAL CARE FIRST HOUR: CPT | Mod: 24,,, | Performed by: STUDENT IN AN ORGANIZED HEALTH CARE EDUCATION/TRAINING PROGRAM

## 2024-02-28 PROCEDURE — 25000242 PHARM REV CODE 250 ALT 637 W/ HCPCS

## 2024-02-28 PROCEDURE — 85025 COMPLETE CBC W/AUTO DIFF WBC: CPT

## 2024-02-28 PROCEDURE — 99900035 HC TECH TIME PER 15 MIN (STAT)

## 2024-02-28 PROCEDURE — 99233 SBSQ HOSP IP/OBS HIGH 50: CPT | Mod: ,,, | Performed by: INTERNAL MEDICINE

## 2024-02-28 PROCEDURE — 20600001 HC STEP DOWN PRIVATE ROOM

## 2024-02-28 PROCEDURE — 80053 COMPREHEN METABOLIC PANEL: CPT

## 2024-02-28 PROCEDURE — 27000207 HC ISOLATION

## 2024-02-28 PROCEDURE — 27000221 HC OXYGEN, UP TO 24 HOURS

## 2024-02-28 PROCEDURE — 25000003 PHARM REV CODE 250

## 2024-02-28 RX ADMIN — INSULIN ASPART 6 UNITS: 100 INJECTION, SOLUTION INTRAVENOUS; SUBCUTANEOUS at 09:02

## 2024-02-28 RX ADMIN — INSULIN ASPART 6 UNITS: 100 INJECTION, SOLUTION INTRAVENOUS; SUBCUTANEOUS at 03:02

## 2024-02-28 RX ADMIN — INSULIN DETEMIR 14 UNITS: 100 INJECTION, SOLUTION SUBCUTANEOUS at 09:02

## 2024-02-28 RX ADMIN — SEVELAMER CARBONATE 1.6 G: 800 POWDER, FOR SUSPENSION ORAL at 09:02

## 2024-02-28 RX ADMIN — FUROSEMIDE 5 MG/HR: 10 INJECTION, SOLUTION INTRAMUSCULAR; INTRAVENOUS at 01:02

## 2024-02-28 RX ADMIN — INSULIN ASPART 1 UNITS: 100 INJECTION, SOLUTION INTRAVENOUS; SUBCUTANEOUS at 07:02

## 2024-02-28 RX ADMIN — INSULIN ASPART 6 UNITS: 100 INJECTION, SOLUTION INTRAVENOUS; SUBCUTANEOUS at 07:02

## 2024-02-28 RX ADMIN — INSULIN ASPART 1 UNITS: 100 INJECTION, SOLUTION INTRAVENOUS; SUBCUTANEOUS at 11:02

## 2024-02-28 RX ADMIN — AMLODIPINE BESYLATE 10 MG: 10 TABLET ORAL at 09:02

## 2024-02-28 RX ADMIN — CARVEDILOL 25 MG: 25 TABLET, FILM COATED ORAL at 09:02

## 2024-02-28 RX ADMIN — SEVELAMER CARBONATE 1.6 G: 800 POWDER, FOR SUSPENSION ORAL at 04:02

## 2024-02-28 RX ADMIN — HEPARIN SODIUM 7500 UNITS: 5000 INJECTION INTRAVENOUS; SUBCUTANEOUS at 05:02

## 2024-02-28 RX ADMIN — INSULIN ASPART 6 UNITS: 100 INJECTION, SOLUTION INTRAVENOUS; SUBCUTANEOUS at 11:02

## 2024-02-28 RX ADMIN — CARVEDILOL 25 MG: 25 TABLET, FILM COATED ORAL at 08:02

## 2024-02-28 RX ADMIN — INSULIN ASPART 2 UNITS: 100 INJECTION, SOLUTION INTRAVENOUS; SUBCUTANEOUS at 04:02

## 2024-02-28 RX ADMIN — INSULIN ASPART 6 UNITS: 100 INJECTION, SOLUTION INTRAVENOUS; SUBCUTANEOUS at 12:02

## 2024-02-28 RX ADMIN — HEPARIN SODIUM 7500 UNITS: 5000 INJECTION INTRAVENOUS; SUBCUTANEOUS at 01:02

## 2024-02-28 RX ADMIN — INSULIN ASPART 6 UNITS: 100 INJECTION, SOLUTION INTRAVENOUS; SUBCUTANEOUS at 04:02

## 2024-02-28 RX ADMIN — HEPARIN SODIUM 7500 UNITS: 5000 INJECTION INTRAVENOUS; SUBCUTANEOUS at 10:02

## 2024-02-28 RX ADMIN — INSULIN ASPART 2 UNITS: 100 INJECTION, SOLUTION INTRAVENOUS; SUBCUTANEOUS at 09:02

## 2024-02-28 RX ADMIN — INSULIN ASPART 2 UNITS: 100 INJECTION, SOLUTION INTRAVENOUS; SUBCUTANEOUS at 12:02

## 2024-02-28 RX ADMIN — INSULIN ASPART 1 UNITS: 100 INJECTION, SOLUTION INTRAVENOUS; SUBCUTANEOUS at 03:02

## 2024-02-28 RX ADMIN — PSYLLIUM HUSK 1 PACKET: 3.4 POWDER ORAL at 09:02

## 2024-02-28 RX ADMIN — SEVELAMER CARBONATE 1.6 G: 800 POWDER, FOR SUSPENSION ORAL at 08:02

## 2024-02-28 RX ADMIN — FUROSEMIDE 120 MG: 10 INJECTION, SOLUTION INTRAVENOUS at 09:02

## 2024-02-28 NOTE — PROGRESS NOTES
[facility] - [facility] Intensive [facility]  Critical Care - Surgery  Progress Note    Patient Name: Sarah Saravia  MRN: [id number]  Admission Date: 1/29/2024  Hospital Length of Stay: 30 days  Code Status: Full Code  Attending Provider: Steve Cole MD  [facility]   Principal Problem: Ayaz's gangrene    Subjective:     Hospital/ICU Course:  No notes on file    Interval History/Significant Events: NAEON. Planning for iHD today. Pending dispo.    Follow-up For: Procedure(s) (LRB):  CREATION, TRACHEOSTOMY (N/A)  EGD, WITH PEG TUBE INSERTION (N/A)  CLOSURE, WOUND (Right)  REPLACEMENT, WOUND VAC (Right)    Post-Operative Day: 6 Days Post-Op    Objective:     Vital Signs (Most Recent):  Temp: 98.9 °F (37.2 °C) (02/28/24 0305)  Pulse: 88 (02/28/24 0500)  Resp: (!) 25 (02/28/24 0500)  BP: (!) 118/57 (02/28/24 0500)  SpO2: 97 % (02/28/24 0500) Vital Signs (24h Range):  Temp:  [98.5 °F (36.9 °C)-99.2 °F (37.3 °C)] 98.9 °F (37.2 °C)  Pulse:  [85-97] 88  Resp:  [19-74] 25  SpO2:  [94 %-100 %] 97 %  BP: (109-165)/(55-79) 118/57     Weight: (!) 137.9 kg (304 lb)  Body mass index is 50.59 kg/m².      Intake/Output Summary (Last 24 hours) at 2/28/2024 0609  Last data filed at 2/28/2024 0500  Gross per 24 hour   Intake 1400 ml   Output 600 ml   Net 800 ml          Physical Exam  Vitals and nursing note reviewed.   Constitutional:       General: She is not in acute distress.     Appearance: She is ill-appearing.   HENT:      Head: Normocephalic and atraumatic.   Eyes:      Comments: Not opening eyes to command    Neck:      Comments: Left IJ Trialysis catheter  Cardiovascular:      Rate and Rhythm: Normal rate and regular rhythm.   Pulmonary:      Effort: Pulmonary effort is normal.      Comments: Trach  Abdominal:      General: There is no distension.      Palpations: Abdomen is soft.      Tenderness: There is no abdominal tenderness.      Comments: PEG in place   Genitourinary:      Comments: Glynn in place    Skin:     General: Skin is warm and dry.      Comments: Wound vac in place to R thigh/groin to suction  Re approximated skin clean dry and intact   Neurological:      Comments: Occasional withdrawal from painful stimuli. No significant purposeful activity             Vents:  Vent Mode: Spont (02/23/24 0708)  Set Rate: 18 BPM (02/06/24 0349)  Vt Set: 420 mL (02/06/24 0349)  Pressure Support: 10 cmH20 (02/23/24 0708)  PEEP/CPAP: 8 cmH20 (02/23/24 0708)  Oxygen Concentration (%): 28 (02/28/24 0500)  Peak Airway Pressure: 43 cmH20 (02/23/24 0708)  Plateau Pressure: 15 cmH20 (02/23/24 0708)  Total Ve: 5.16 L/m (02/23/24 0708)  Negative Inspiratory Force (cm H2O): 0 (02/23/24 0708)  F/VT Ratio<105 (RSBI): (!) 32.33 (02/23/24 0319)    Lines/Drains/Airways       Central Venous Catheter Line  Duration             Trialysis (Dialysis) Catheter 02/11/24 2303 left internal jugular 16 days              Drain  Duration                  Gastrostomy/Enterostomy 02/22/24 1200 LUQ 5 days         Open Drain 02/22/24 1414 Tube - 1 Right Thigh Penrose Other (see comments) 5 days              Airway  Duration             Adult Surgical Airway 02/22/24 Shiley Cuffed 8.0 / 85 mm 6 days              Peripheral Intravenous Line  Duration                  Peripheral IV - Single Lumen 02/25/24 1601 18 G Left Antecubital 2 days                    Significant Labs:    CBC/Anemia Profile:  Recent Labs   Lab 02/27/24  0457 02/28/24  0309   WBC 10.95 11.94   HGB 7.9* 8.0*   HCT 25.9* 25.8*   * 554*   MCV 91 90   RDW 17.2* 17.2*        Chemistries:  Recent Labs   Lab 02/27/24  0457 02/28/24  0309    136   K 4.4 4.6    102   CO2 20* 21*   BUN 42* 58*   CREATININE 3.2* 4.1*   CALCIUM 9.4 9.5   ALBUMIN 2.2* 2.3*   PROT 7.5 7.7   BILITOT 0.2 0.2   ALKPHOS 166* 156*   ALT 41 41   AST 70* 56*   MG 2.1 2.1   PHOS 3.6 4.7*       All pertinent labs within the past 24 hours have been reviewed.    Significant  Imaging:  I have reviewed all pertinent imaging results/findings within the past 24 hours.  Assessment/Plan:     Renal/  * Ayaz's gangrene    Neuro/Psych:   Acute Encephalopathy  CTH 2/3 with scattered hypoattenuation likely representing age indeterminate infarcts. EEG findings consistent with moderate-severe encephalopathy. MRI 2/6: Several scattered punctate acute infarcts throughout the bilateral frontal lobes, centrum semiovale, basal ganglia, corpus callosum, and cerebellar hemispheres.  CTA 2/6: Atherosclerotic plaquing of the carotid bifurcations and proximal ICAs with less than 50% proximal ICA stenosis by NASCET criteria . Repeat CTH and MRI/MRA unchanged from prior exams. S/p multiple wound vac exchanges. Palliative onboard with family wanting full measures.  - Repeat CTH and MRI/MRA stable from initial reads. LP 2/16. Elevated WBCs and protein consistent with bacterial meningitis.  - CSF culture 2/19 with no growth  - Neuro and vascular neurology previously following and signed off  -- Sedation: None  -- Pain: kermit tylenol, dialudid and oxy prn  -- GCS 6T: E2, V1T, M3;              Cards:   -- HDS. 110-140s systolics 2/28 not requiring prns  -- Goal SBP < 180, MAP >65  -- Hydralazine and labetalol prns  -- Continue amlodipine 10mg daily; coreg 25mg BID      Pulm:   Acute Respiratory Failure  -- s/p tracheostomy on 2/22. Tolerating trach collar  -- Goal O2 sat > 90%      Renal:  ALVARADO on CKD, ATN 2/2 septic shock   -- L IJ trialysis placed 2/13 for CRRT    -- Has tolerated 2 HD sessions, 3x planned today  -- Plan for tunneled line 2/29  -- Nephrology following; appreciate recs  -- On sevelamer  -- Replace lytes as needed.  -- Consider stopping lasix 120mg IV tid     Recent Labs   Lab 02/25/24  0320 02/26/24  0355 02/27/24  0457   BUN 35*  35* 57*  53* 42*   CREATININE 3.4*  3.3* 4.4*  4.4* 3.2*        FEN / GI:   -- Daily CMPs  -- NGT in place   -- Replace lytes as needed  -- Nutrition: NPO, TF  (Peptamen Intense) at goal  -- Nutrition following; appreciate recs  -- Continue psyllium and sevelamer      ID:   Ayaz's gangrene  -- multiple wound vac changes, next planned w/ wound care 2/29  -- Completed abx course  -- ID signed off 2/21     Heme/Onc:  -- CBC daily  -- Heparin DVT ppx  -- Transfuse if Hgb <7     Endo:   IDDM  Initially presented with DKA    - q4h BG monitoring while NPO  - Detemir 14 units daily  - Novolog 6units q4h while on TFs  - Moderate dose SSI PRN      PPx:   Feeding: TF at goal  Analgesia/Sedation: See above  Thromboembolic prevention: SQH   HOB >30: Y  Stress Ulcer ppx: not indicated  Glucose control: Goal 140-180 g/dl, kermit detemir and novolog, SSI, Endo following  Bowel reg: psyllium  Invasive Lines/Drains/Airway: trach, LIJ Trialysis, PIV x2      Dispo/Code Status/Palliative:   -- SICU / Full Code   -- Planning transition to LTAC; appreciate social work help  -- Stable for ROMAIN Keller MD  Critical Care - Surgery  [facility]

## 2024-02-28 NOTE — ASSESSMENT & PLAN NOTE
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 Palliative and Supportive Care was consulted to explore goals of care.    Advance Care Planning   Goals of Care  - Code status: Full code  - Next of kin: two adult children   - Daughter [facility] [phone]   - Son is in correction  - ACP documents: none  - Patient does not have decision making capacity  - Prognosis: guarded  - Goals: life prolongation  - Plans: continue full supportive care    Goals of Care Conversation:  - 2/7/24: I met Ms. Saravia, intubated, not on sedation but not responsive or following commands, on spontaneous breathing trial. No family at bedside. I called her daughter [facility], who shared her understanding that her mother was newly found to have many mini-strokes in her brain, but believed that the doctors were looking for the source of the clot. We reviewed her mother's severe medical illnesses, including Ayaz's gangrene for which she received several debridements/wound vac exchanges, multiple IV antibiotics which she is still on, severe hyperglycemia, dependence on ventilator to protect her airway in setting of severe sepsis, acute renal failure now dependent on dialysis machine and encephalopathy for which the doctors discovered multiple mini-strokes. I shared that I learned that the team engaged the stroke physicians, who expressed concern that the pattern of stroke in the setting of severe infection might be related to each other, and are searching for possible sources of infection  hiding elsewhere in her body, including her heart. Aleks asked if possibly the sponge inside her mother's wound was the source of infection, and I shared that knowing that that the surgeons are still doing wound vac exchanges, that likely it isn't because they are treating her proactively to not only wipe away any infection that they can, but to also proactively treat her to minimize her risk of worsening infection. I admitted that I'm not a surgeon and cannot explain this as helpful as I wish I could. Aleks shared that thankfully the doctor on the surgical team has been communicating in understandable terms so feels assured that she is being informed thoroughly and trusts that she can ask these questions again in the future. She was appropriately tearful as I explained concern that her mother is critically ill and that her surgical team is aggressively treating her and thoroughly looking for any opportunities to help her get better. Aleks expressed gratefulness for this.  - I asked Aleks to share with me more about her mother. 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 She lives with her long-time partner of 26-28 years. Aleks is 31-years-old so recognized this man as her step-father and consider each other as family, though Ms. Saravia and her partner are not legally . Ms. Saravia also has a son, but he is in correction. She has five grandchildren (through son), enjoys watching movies, going out to eat, and shopping all day. This sudden illness has been a huge shock and change in her life, and are hoping to help her return to her original life. Validated how terrifying and overwhelming this is, and reassured her that the doctors are working very aggressively in hopes to restore as much of her mother's health. Did warn that while the doctors promise to be thoughtful, compassionate and thorough, it doesn't mean we can promise the results we pray and hope  for. Aleks expressed understanding. Will continue to follow along for support as Ms. Saravia's trajectory unfolds.  - 2/12/24: Met Ms. Saravia and her long-time boyfriend Mr. Gallegos at bedside. Supportive conversation held which Ms. Saravia could not participate due to persistent encephalopathy. Mr. Gallegos expresses deep thanks to the SICU team for doing everything they can to find out why she is persistently encephalopathic. He shared how shocking it was for the sudden decline after her first operation. He shares that Ms. Saravia has always been a very active woman, echoing Aleks who had shared with me earlier about her love for shopping, eating at restaurants and going to the movies. He admits that they've never discussed end of life or preferences related to artificial life support. 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 He thinks that as an active woman, a life committed to the bed would be devastating. However, today he saw her open her eyes to his voice and move her foot. This is an improvement in her neuro status and he is hoping that this is a sign that there is more improvement in the future. Agreed that it's so important to hold hope for a meaningful recovery and also staying mindful that we respect Ms. Saravia's dignity and quality of life by not committing her to a life that she would not find acceptable. He reiterated family's deep desire to exhaust all opportunities to help her improve. Reassured him that her team is absolutely doing this and also staying mindful/transparent about their own worries about our limitations as human beings as well. He tells me that he spends the nights with Ms. Saravia, leaves around 11 AM to return home to wash up/take care of responsibilities. I shared I will return later this week in the morning to make sure I can check in again during the hours he is in the  hospital.  - 2/15/24: Remains encephalopathic despite no sedation, doing well on SBT. Returned from OR for wound vac exchange and LP attempted but will be reattempted later due to needle not being long enough to receive CSF fluid. Spoke with daughter on the phone who was very aware of the above, and she shared her conversation about trach/PEG with the surgery team earlier. She shared her understanding that these were reversible interventions in the future when Ms. Saravia recovered. She shared that expectations are for her mother to make a meaningful recovery. I asked what if Ms. Saravia did not improve, and she had to live the rest of her life dependent on others, on machines with trach/PEG. I asked her what would Ms. Saravia say in response to that, and Aleks admits that she doesn't know. There was quite a bit of silence during this conversation. Acknowledged how difficult this is, but that life after trach/PEG would involve likely nursing homes for SNF, and likely trajectory of back and forth from SNF to hospital. Aleks remains hopeful that her mother will make a meaningful recovery and return home. Ms. Saravia's friends are in a very similar field of health care, and would help out at home as well. Would benefit from counseling regarding prognosis and unlikeliness to recover mental status back to baseline or inability to restore independence and prior quality of life. Otherwise, family is expecting meaningful recovery which is why they would like to proceed with full supportive care.  - 2/19/24: Spoke with Aleks via telephone. She had actually just left the hospital. She told me that she was able to ask her mom to move her hand and she did. She is so grateful she had a witness see this because everyone else has been saying that she doesn't make meaningful movement, but today she followed her daughter's requests/commands. Because she is seeing improvement, she'd like to continue interventions that may buy her more  "time, including PEG/trach. She is immensely grateful for the communication from the surgeons who keep her informed and also grateful for our conversations and check ins during this difficult time. Will continue to follow along.  - 2/28/24: Supportive conversation with Mr. Gallegos, significant other, at bedside. He shares that Ms. Saravia is making improvement with her mental status, most recently mouthing "yes" to certain questions, moving her limbs spontaneously and opening her eyes to voice (which she did for me). He is agreeable with plans for LTACH in hopes to regain as much strength and restore as much health as possible. Emotional support provided. He expresses deep thanks to the SICU team for their efforts in caring for Ms. Saravia. Asked again about my role (palliative) which I clarified as well. Wished Mr. Gallegos and Ms. Saravia a protected, safe future, which Mr. Gallegos also expressed.       "

## 2024-02-28 NOTE — PROGRESS NOTES
[facility] - Surgical Intensive Care  Palliative Medicine  Progress Note    Patient Name: Sarah Saravia  MRN: [id number]  Admission Date: 1/29/2024  Hospital Length of Stay: 30 days  Code Status: Full Code   Attending Provider: Steve Cole MD  Consulting Provider: Estee Guzman MD  [facility]  Principal Problem:Ayaz's gangrene    Patient information was obtained from spouse/SO and primary team.      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 Palliative and Supportive Care was consulted to explore goals of care.    Advance Care Planning  Goals of Care  - Code status: Full code  - Next of kin: two adult children   - Daughter [facility] [phone]   - Son is in long-term  - ACP documents: none  - Patient does not have decision making capacity  - Prognosis: guarded  - Goals: life prolongation  - Plans: continue full supportive care    Goals of Care Conversation:  - 2/7/24: I met Ms. Saravia, intubated, not on sedation but not responsive or following commands, on spontaneous breathing trial. No family at bedside. I called her daughter [facility], who shared her understanding that her mother was newly found to have many mini-strokes in her brain, but believed that the doctors were looking for the source of the clot. We reviewed her mother's severe medical illnesses, including Ayaz's gangrene for which she received several debridements/wound  vac exchanges, multiple IV antibiotics which she is still on, severe hyperglycemia, dependence on ventilator to protect her airway in setting of severe sepsis, acute renal failure now dependent on dialysis machine and encephalopathy for which the doctors discovered multiple mini-strokes. I shared that I learned that the team engaged the stroke physicians, who expressed concern that the pattern of stroke in the setting of severe infection might be related to each other, and are searching for possible sources of infection hiding elsewhere in her body, including her heart. Aleks asked if possibly the sponge inside her mother's wound was the source of infection, and I shared that knowing that that the surgeons are still doing wound vac exchanges, that likely it isn't because they are treating her proactively to not only wipe away any infection that they can, but to also proactively treat her to minimize her risk of worsening infection. I admitted that I'm not a surgeon and cannot explain this as helpful as I wish I could. Aleks shared that thankfully the doctor on the surgical team has been communicating in understandable terms so feels assured that she is being informed thoroughly and trusts that she can ask these questions again in the future. She was appropriately tearful as I explained concern that her mother is critically ill and that her surgical team is aggressively treating her and thoroughly looking for any opportunities to help her get better. Aleks expressed gratefulness for this.  - I asked Aleks to share with me more about her mother. 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 She lives with her long-time partner of 26-28 years. Aleks is 31-years-old so recognized this man as her step-father and consider each other as family, though Ms. Saravia and her partner are not legally . Ms. Saravia also has a son, but he is in intermediate. She has five  grandchildren (through son), enjoys watching movies, going out to eat, and shopping all day. This sudden illness has been a huge shock and change in her life, and are hoping to help her return to her original life. Validated how terrifying and overwhelming this is, and reassured her that the doctors are working very aggressively in hopes to restore as much of her mother's health. Did warn that while the doctors promise to be thoughtful, compassionate and thorough, it doesn't mean we can promise the results we pray and hope for. Aleks expressed understanding. Will continue to follow along for support as Ms. Saravia's trajectory unfolds.  - 2/12/24: Met Ms. Saravia and her long-time boyfriend Mr. Gallegos at bedside. Supportive conversation held which Ms. Saravia could not participate due to persistent encephalopathy. Mr. Gallegos expresses deep thanks to the SICU team for doing everything they can to find out why she is persistently encephalopathic. He shared how shocking it was for the sudden decline after her first operation. He shares that Ms. Saravia has always been a very active woman, echoing Aleks who had shared with me earlier about her love for shopping, eating at restaurants and going to the movies. He admits that they've never discussed end of life or preferences related to artificial life support. 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 He thinks that as an active woman, a life committed to the bed would be devastating. However, today he saw her open her eyes to his voice and move her foot. This is an improvement in her neuro status and he is hoping that this is a sign that there is more improvement in the future. Agreed that it's so important to hold hope for a meaningful recovery and also staying mindful that we respect Ms. Saravia's dignity and quality of life by not committing her to a life that she  would not find acceptable. He reiterated family's deep desire to exhaust all opportunities to help her improve. Reassured him that her team is absolutely doing this and also staying mindful/transparent about their own worries about our limitations as human beings as well. He tells me that he spends the nights with Ms. Saravia, leaves around 11 AM to return home to wash up/take care of responsibilities. I shared I will return later this week in the morning to make sure I can check in again during the hours he is in the hospital.  - 2/15/24: Remains encephalopathic despite no sedation, doing well on SBT. Returned from OR for wound vac exchange and LP attempted but will be reattempted later due to needle not being long enough to receive CSF fluid. Spoke with daughter on the phone who was very aware of the above, and she shared her conversation about trach/PEG with the surgery team earlier. She shared her understanding that these were reversible interventions in the future when Ms. Saravia recovered. She shared that expectations are for her mother to make a meaningful recovery. I asked what if Ms. Saravia did not improve, and she had to live the rest of her life dependent on others, on machines with trach/PEG. I asked her what would Ms. Saravia say in response to that, and Aleks admits that she doesn't know. There was quite a bit of silence during this conversation. Acknowledged how difficult this is, but that life after trach/PEG would involve likely nursing homes for SNF, and likely trajectory of back and forth from SNF to hospital. Aleks remains hopeful that her mother will make a meaningful recovery and return home. Ms. Saravia's friends are in a very similar field of health care, and would help out at home as well. Would benefit from counseling regarding prognosis and unlikeliness to recover mental status back to baseline or inability to restore independence and prior quality of life. Otherwise, family is expecting  "meaningful recovery which is why they would like to proceed with full supportive care.  - 2/19/24: Spoke with Aleks via telephone. She had actually just left the hospital. She told me that she was able to ask her mom to move her hand and she did. She is so grateful she had a witness see this because everyone else has been saying that she doesn't make meaningful movement, but today she followed her daughter's requests/commands. Because she is seeing improvement, she'd like to continue interventions that may buy her more time, including PEG/trach. She is immensely grateful for the communication from the surgeons who keep her informed and also grateful for our conversations and check ins during this difficult time. Will continue to follow along.  - 2/28/24: Supportive conversation with Mr. Gallegos, significant other, at bedside. He shares that Ms. Saravia is making improvement with her mental status, most recently mouthing "yes" to certain questions, moving her limbs spontaneously and opening her eyes to voice (which she did for me). He is agreeable with plans for LTACH in hopes to regain as much strength and restore as much health as possible. Emotional support provided. He expresses deep thanks to the SICU team for their efforts in caring for Ms. Saravia. Asked again about my role (palliative) which I clarified as well. Wished Mr. Gallegos and Ms. Saravia a protected, safe future, which Mr. Gallegos also expressed.             I will sign off. Please contact us if you have any additional questions.    Subjective:     Chief Complaint:   Chief Complaint   Patient presents with    Vomiting     Vomiting and diarrhea for 3-4 days.   Wound to posterior right thigh.        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 Palliative and Supportive Care was consulted to explore goals of care.    Hospital Course:  No notes on file    Interval History: Supportive conversation held with significant other at bedside. Ms. Saravia opens her eyes to voice, moving legs independently. Significant other says that she is making more meaningful movements and mouthed "yes" to certain questions.    Past Medical History:  T2DM  CKD3a    Past Surgical History:   Procedure Laterality Date    CLOSURE OF WOUND Right 2/22/2024    Procedure: CLOSURE, WOUND;  Surgeon: Steve Cole MD;  Location: [facility] OR [address] [place] [address];  Service: General;  Laterality: Right;    ESOPHAGOGASTRODUODENOSCOPY W/ PEG N/A 2/22/2024    Procedure: EGD, WITH PEG TUBE INSERTION;  Surgeon: Steve Cole MD;  Location: [facility] OR [address] [place] [address];  Service: General;  Laterality: N/A;    INCISION AND DRAINAGE OF PERIRECTAL REGION N/A 1/29/2024    Procedure: INCISION AND DRAINAGE, PERIRECTAL REGION;  Surgeon: Axel Ramsay MD;  Location: [facility] OR;  Service: General;  Laterality: N/A;    LUMBAR PUNCTURE N/A 2/16/2024    Procedure: Lumbar Puncture;  Surgeon: Macy Boykin;  Location: [facility];  Service: Anesthesiology;  Laterality: N/A;    PLACEMENT, TRIALYSIS CATH Right 1/31/2024    Procedure: INSERTION, CATHETER, TRIPLE LUMEN, HEMODIALYSIS, TEMPORARY;  Surgeon: Alvin Junior MD;  Location: [facility] OR;  Service: General;  Laterality: Right;    REPLACEMENT OF WOUND VACUUM-ASSISTED CLOSURE DEVICE Right 2/12/2024    Procedure: REPLACEMENT, WOUND VAC;  Surgeon: Mundo Carmona MD;  Location: [facility] OR [address] [place] [address];  Service: General;  Laterality: Right;    REPLACEMENT OF WOUND VACUUM-ASSISTED CLOSURE DEVICE N/A 2/15/2024    Procedure: REPLACEMENT, WOUND VAC;  Surgeon: Steve Cole MD;  " Location: [facility] OR [facility];  Service: General;  Laterality: N/A;    REPLACEMENT OF WOUND VACUUM-ASSISTED CLOSURE DEVICE Right 2/19/2024    Procedure: REPLACEMENT, WOUND VAC;  Surgeon: Steve Cole MD;  [facility];  Service: General;  Laterality: Right;  RLE/groin    REPLACEMENT OF WOUND VACUUM-ASSISTED CLOSURE DEVICE Right 2/22/2024    Procedure: REPLACEMENT, WOUND VAC;  Surgeon: Steve Cole MD;  [facility];  Service: General;  Laterality: Right;    TRACHEOSTOMY N/A 2/22/2024    Procedure: CREATION, TRACHEOSTOMY;  Surgeon: Steve Cole MD;  [facility];  Service: General;  Laterality: N/A;    WOUND DEBRIDEMENT Bilateral 2/2/2024    Procedure: DEBRIDEMENT, WOUND;  Surgeon: Steve Cole MD;  Location: [address];  Service: General;  Laterality: Bilateral;  Bilateral groin  Possible wound vac placement    WOUND DEBRIDEMENT Right 2/6/2024    Procedure: DEBRIDEMENT, WOUND, replace wound vac, possible closure;  Surgeon: Steve Cole MD;  [address];  Service: General;  Laterality: Right;  RLE    WOUND DEBRIDEMENT Right 2/9/2024    Procedure: DEBRIDEMENT, WOUND w wound vac change;  Surgeon: Steve Cole MD;  Location: [address];  Service: General;  Laterality: Right;  RLE    WOUND DEBRIDEMENT Right 2/12/2024    Procedure: R thigh wound debridement;  Surgeon: Mundo Carmona MD;  Location: [address];  Service: General;  Laterality: Right;    WOUND EXPLORATION Right 1/31/2024    Procedure: IRRIGATION & DEBRIDEMENT, WOUND DEBRIDEMENT;  Surgeon: Alvin Junior MD;  Location: [facility] OR;  Service: General;  Laterality: Right;       Review of patient's allergies indicates:  No Known Allergies    Medications:  Continuous Infusions:      Scheduled Meds:   sodium chloride 0.9%   Intravenous Once    amLODIPine  10 mg Per G Tube Daily    carvediloL  25 mg Per G Tube BID    furosemide (LASIX) injection  120 mg Intravenous TID    heparin  (porcine)  7,500 Units Subcutaneous Q8H    insulin aspart U-100  6 Units Subcutaneous Q4H    insulin detemir U-100  14 Units Subcutaneous Daily    psyllium husk (aspartame)  1 packet Per G Tube Daily    sevelamer carbonate  1.6 g Per G Tube TID     PRN Meds:sodium chloride 0.9%, acetaminophen, albuterol-ipratropium, dextrose 10%, dextrose 10%, glucagon (human recombinant), glucose, glucose, heparin (porcine), hydrALAZINE, insulin aspart U-100, labetalol, naloxone, ondansetron, prochlorperazine, sodium chloride 0.9%, sodium chloride 0.9%    Family History    None       Tobacco Use    Smoking status: Not on file    Smokeless tobacco: Not on file   Substance and Sexual Activity    Alcohol use: Not on file    Drug use: Not on file    Sexual activity: Not on file       Review of Systems   Unable to perform ROS: Mental status change     Objective:     Vital Signs (Most Recent):  Temp: 98.9 °F (37.2 °C) (02/28/24 0305)  Pulse: 94 (02/28/24 0600)  Resp: (!) 34 (02/28/24 0600)  BP: 128/75 (02/28/24 0600)  SpO2: 96 % (02/28/24 0600) Vital Signs (24h Range):  Temp:  [98.5 °F (36.9 °C)-99.2 °F (37.3 °C)] 98.9 °F (37.2 °C)  Pulse:  [85-95] 94  Resp:  [23-74] 34  SpO2:  [94 %-100 %] 96 %  BP: (109-165)/(55-79) 128/75     Weight: (!) 137.9 kg (304 lb)  Body mass index is 50.59 kg/m².       Physical Exam  Constitutional:       Appearance: She is obese.   HENT:      Head: Normocephalic and atraumatic.      Right Ear: External ear normal.      Left Ear: External ear normal.      Nose: Nose normal.      Mouth/Throat:      Mouth: Mucous membranes are dry.   Eyes:      Conjunctiva/sclera: Conjunctivae normal.   Cardiovascular:      Rate and Rhythm: Normal rate.   Pulmonary:      Breath sounds: Normal breath sounds.      Comments: On spontaneous breathing trial, ETT connected to ventilator  Abdominal:      General: Bowel sounds are normal.      Palpations: Abdomen is soft.   Musculoskeletal:         General: Swelling present.    Lymphadenopathy:      Cervical: No cervical adenopathy.   Skin:     General: Skin is warm and dry.   Neurological:      Comments: Not following commands despite no sedation            Review of Symptoms      Symptom Assessment (ESAS 0-10 Scale)  Unable to complete assessment due to Mental status change         Pain Assessment in Advanced Demential Scale (PAINAD)   Breathing - Independent of vocalization:  0  Negative vocalization:  0  Facial expression:  0  Body language:  0  Consolability:  0  Total:  0    Living Arrangements:  Lives with spouse    Psychosocial/Cultural:   See Palliative Psychosocial Note: No  Lives with long-time partner - not legally , together for 26 to 28 years. Has adult daughter (age 31) and adult son, who is in nursing home. Has five grandchildren. Enjoyed watching movies, going out to eat, shopping. Worked as a patient caregiver for blind patients.  **Primary  to Follow**  Palliative Care  Consult: No    Spiritual:  F - Belen and Belief:  Believes in God  I - Importance:  Important  C - Community:  Does not attend [employer]  A - Address in Care:  Engage  support        Advance Care Planning  Advance Directives:   Living Will: No    LaPOST: No    Do Not Resuscitate Status: No    Medical Power of : No      Decision Making:  Family answered questions  Goals of Care: What is most important right now is to focus on curative/life-prolongation (regardless of treatment burdens). Accordingly, we have decided that the best plan to meet the patient's goals includes continuing with treatment.         Significant Labs: CBC:   Recent Labs   Lab 02/27/24  0457 02/28/24  0309   WBC 10.95 11.94   HGB 7.9* 8.0*   HCT 25.9* 25.8*   * 554*       CMP:   Recent Labs   Lab 02/27/24  0457 02/28/24  0309    136   K 4.4 4.6    102   CO2 20* 21*   * 162*   BUN 42* 58*   CREATININE 3.2* 4.1*   CALCIUM 9.4 9.5   PROT 7.5 7.7   ALBUMIN 2.2* 2.3*   BILITOT  0.2 0.2   ALKPHOS 166* 156*   AST 70* 56*   ALT 41 41   ANIONGAP 12 13       CBC:   Recent Labs   Lab 02/28/24  0309   WBC 11.94   HGB 8.0*   HCT 25.8*   MCV 90   *       BMP:  Recent Labs   Lab 02/28/24  0309   *      K 4.6      CO2 21*   BUN 58*   CREATININE 4.1*   CALCIUM 9.5   MG 2.1       LFT:  Lab Results   Component Value Date    AST 56 (H) 02/28/2024    ALKPHOS 156 (H) 02/28/2024    BILITOT 0.2 02/28/2024     Albumin:   Albumin   Date Value Ref Range Status   02/28/2024 2.3 (L) 3.5 - 5.2 g/dL Final     Protein:   Total Protein   Date Value Ref Range Status   02/28/2024 7.7 6.0 - 8.4 g/dL Final     Lactic acid:   Lab Results   Component Value Date    LACTATE 1.5 02/01/2024    LACTATE 2.4 (H) 01/31/2024       Significant Imaging: I have reviewed all pertinent imaging results/findings within the past 24 hours.    I spent a total of 50 minutes on the day of the visit. This includes face to face time in discussion of goals of care, symptom assessment, coordination of care and emotional support. This also includes non-face to face time preparing to see the patient (eg, review of tests/imaging), obtaining and/or reviewing separately obtained history, documenting clinical information in the electronic or other health record, independently interpreting results and communicating results to the patient/family/caregiver, or care coordinator.       Estee Guzman MD  Palliative Medicine  [facility] - Surgical Intensive Care

## 2024-02-28 NOTE — SUBJECTIVE & OBJECTIVE
Interval History: NAEO. VSS. Patient stable on trach collar. Plan for HD today. IR to place tunneled HD catheter tomorrow.     Medications:  Continuous Infusions:  Scheduled Meds:   sodium chloride 0.9%   Intravenous Once    amLODIPine  10 mg Per G Tube Daily    carvediloL  25 mg Per G Tube BID    furosemide (LASIX) injection  120 mg Intravenous TID    heparin (porcine)  7,500 Units Subcutaneous Q8H    insulin aspart U-100  6 Units Subcutaneous Q4H    insulin detemir U-100  14 Units Subcutaneous Daily    psyllium husk (aspartame)  1 packet Per G Tube Daily    sevelamer carbonate  1.6 g Per G Tube TID     PRN Meds:sodium chloride 0.9%, acetaminophen, albuterol-ipratropium, dextrose 10%, dextrose 10%, glucagon (human recombinant), glucose, glucose, hydrALAZINE, insulin aspart U-100, labetalol, naloxone, ondansetron, prochlorperazine, sodium chloride 0.9%, sodium chloride 0.9%     Review of patient's allergies indicates:  No Known Allergies  Objective:     Vital Signs (Most Recent):  Temp: 98.9 °F (37.2 °C) (02/28/24 0305)  Pulse: 87 (02/28/24 0909)  Resp: (!) 32 (02/28/24 0827)  BP: (!) 155/72 (02/28/24 0909)  SpO2: 95 % (02/28/24 0827) Vital Signs (24h Range):  Temp:  [98.5 °F (36.9 °C)-99.2 °F (37.3 °C)] 98.9 °F (37.2 °C)  Pulse:  [85-95] 87  Resp:  [23-74] 32  SpO2:  [94 %-100 %] 95 %  BP: (109-165)/(55-75) 155/72     Weight: (!) 137.9 kg (304 lb)  Body mass index is 50.59 kg/m².    Intake/Output - Last 3 Shifts         02/26 0700 02/27 0659 02/27 0700 02/28 0659 02/28 0700 02/29 0659    NG/GT 1170 1450     Total Intake(mL/kg) 1170 (8.3) 1450 (10.5)     Urine (mL/kg/hr) 0 (0) 600 (0.2)     Other 1125      Stool 0 0     Total Output 1125 600     Net +45 +850            Urine Occurrence 2 x 6 x     Stool Occurrence 1 x 2 x              Physical Exam  Vitals and nursing note reviewed.   Constitutional:       General: She is not in acute distress.     Appearance: She is ill-appearing.   HENT:      Head:  Normocephalic and atraumatic.   Eyes:      Comments: Not opening eyes to command    Neck:      Comments: Left IJ Trialysis catheter  Cardiovascular:      Rate and Rhythm: Normal rate and regular rhythm.   Pulmonary:      Effort: Pulmonary effort is normal.      Comments: trached    Abdominal:      General: There is no distension.      Palpations: Abdomen is soft.      Tenderness: There is no abdominal tenderness.      Comments: PEG in place   Genitourinary:     Comments: Glynn in place    Skin:     General: Skin is warm and dry.      Comments: Wound vac in place to R thigh/groin to suction  Re approximated skin clean dry and intact   Neurological:      Comments: Occasionally withdraws from painful stimuli. No significant purposeful activity           Significant Labs:  I have reviewed all pertinent lab results within the past 24 hours.  CBC:   Recent Labs   Lab 02/28/24  0309   WBC 11.94   RBC 2.86*   HGB 8.0*   HCT 25.8*   *   MCV 90   MCH 28.0   MCHC 31.0*     BMP:   Recent Labs   Lab 02/28/24  0309   *      K 4.6      CO2 21*   BUN 58*   CREATININE 4.1*   CALCIUM 9.5   MG 2.1       Significant Diagnostics:  I have reviewed all pertinent imaging results/findings within the past 24 hours.

## 2024-02-28 NOTE — ASSESSMENT & PLAN NOTE
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 Unfortunately, has multiple infarcts on MRI brain with unchanged neuro status, however, family would like to proceed with all measures. S/p trach, peg, and lower wound closure, replacement of wound vac in right groin on 2/22/24.     - Wound vac changed 2/26. Wound care to change next wound vac.   - HD today  - IR to place line tomorrow   - LP 2/16 - NGTD  - ID consulted for urine cultures -  zosyn    - UA growing Burkholderia species and Chryseibacterium Indologenes, recommend IV zosyn with stop date of 2/22  - Palliative following, full code  - Stable on trach collar  - Okay for DVT ppx   - Remainder of care per SICU    Dispo: LTAC placement once line in place

## 2024-02-28 NOTE — SUBJECTIVE & OBJECTIVE
"Interval History: Supportive conversation held with significant other at bedside. Ms. Saravia opens her eyes to voice, moving legs independently. Significant other says that she is making more meaningful movements and mouthed "yes" to certain questions.    Past Medical History:  T2DM  CKD3a    Past Surgical History:   Procedure Laterality Date    CLOSURE OF WOUND Right 2/22/2024    Procedure: CLOSURE, WOUND;  Surgeon: Steve Cole MD;  Location: [facility] OR [address] [place] [address];  Service: General;  Laterality: Right;    ESOPHAGOGASTRODUODENOSCOPY W/ PEG N/A 2/22/2024    Procedure: EGD, WITH PEG TUBE INSERTION;  Surgeon: Steve Cole MD;  [facility];  Service: General;  Laterality: N/A;    INCISION AND DRAINAGE OF PERIRECTAL REGION N/A 1/29/2024    Procedure: INCISION AND DRAINAGE, PERIRECTAL REGION;  Surgeon: Axel Ramsay MD;  Location: [facility] OR;  Service: General;  Laterality: N/A;    LUMBAR PUNCTURE N/A 2/16/2024    Procedure: Lumbar Puncture;  Surgeon: Macy Boykin;  Location: [facility];  Service: Anesthesiology;  Laterality: N/A;    PLACEMENT, TRIALYSIS CATH Right 1/31/2024    Procedure: INSERTION, CATHETER, TRIPLE LUMEN, HEMODIALYSIS, TEMPORARY;  Surgeon: Alvin Junior MD;  Location: [facility] OR;  Service: General;  Laterality: Right;    REPLACEMENT OF WOUND VACUUM-ASSISTED CLOSURE DEVICE Right 2/12/2024    Procedure: REPLACEMENT, WOUND VAC;  Surgeon: Mundo Carmona MD;  Location: [facility] OR [address] [place] [address];  Service: General;  Laterality: Right;    REPLACEMENT OF WOUND VACUUM-ASSISTED CLOSURE DEVICE N/A 2/15/2024    Procedure: REPLACEMENT, WOUND VAC;  Surgeon: Steve Cole MD;  [facility];  Service: General;  Laterality: N/A;    REPLACEMENT OF WOUND VACUUM-ASSISTED CLOSURE DEVICE Right 2/19/2024    Procedure: REPLACEMENT, WOUND VAC;  Surgeon: Steve Cole MD;  Location: [facility] OR [facility];  Service: General;  Laterality: Right;  RLE/groin    REPLACEMENT OF WOUND VACUUM-ASSISTED CLOSURE DEVICE " Right 2/22/2024    Procedure: REPLACEMENT, WOUND VAC;  Surgeon: Steve Cole MD;  Location: [facility] OR 2ND FLR;  Service: General;  Laterality: Right;    TRACHEOSTOMY N/A 2/22/2024    Procedure: CREATION, TRACHEOSTOMY;  Surgeon: Steve Cole MD;  Location: [facility] OR 2ND FLR;  Service: General;  Laterality: N/A;    WOUND DEBRIDEMENT Bilateral 2/2/2024    Procedure: DEBRIDEMENT, WOUND;  Surgeon: Steve Cole MD;  [facility];  Service: General;  Laterality: Bilateral;  Bilateral groin  Possible wound vac placement    WOUND DEBRIDEMENT Right 2/6/2024    Procedure: DEBRIDEMENT, WOUND, replace wound vac, possible closure;  Surgeon: Steve Cole MD;  [facility];  Service: General;  Laterality: Right;  RLE    WOUND DEBRIDEMENT Right 2/9/2024    Procedure: DEBRIDEMENT, WOUND w wound vac change;  Surgeon: Steve Cole MD;  [facility];  Service: General;  Laterality: Right;  RLE    WOUND DEBRIDEMENT Right 2/12/2024    Procedure: R thigh wound debridement;  Surgeon: Mundo Carmona MD;  [facility];  Service: General;  Laterality: Right;    WOUND EXPLORATION Right 1/31/2024    Procedure: IRRIGATION & DEBRIDEMENT, WOUND DEBRIDEMENT;  Surgeon: Alvin Junior MD;  Location: [facility];  Service: General;  Laterality: Right;       Review of patient's allergies indicates:  No Known Allergies    Medications:  Continuous Infusions:      Scheduled Meds:   sodium chloride 0.9%   Intravenous Once    amLODIPine  10 mg Per G Tube Daily    carvediloL  25 mg Per G Tube BID    furosemide (LASIX) injection  120 mg Intravenous TID    heparin (porcine)  7,500 Units Subcutaneous Q8H    insulin aspart U-100  6 Units Subcutaneous Q4H    insulin detemir U-100  14 Units Subcutaneous Daily    psyllium husk (aspartame)  1 packet Per G Tube Daily    sevelamer carbonate  1.6 g Per G Tube TID     PRN Meds:sodium chloride 0.9%, acetaminophen, albuterol-ipratropium, dextrose 10%, dextrose  10%, glucagon (human recombinant), glucose, glucose, heparin (porcine), hydrALAZINE, insulin aspart U-100, labetalol, naloxone, ondansetron, prochlorperazine, sodium chloride 0.9%, sodium chloride 0.9%    Family History    None       Tobacco Use    Smoking status: Not on file    Smokeless tobacco: Not on file   Substance and Sexual Activity    Alcohol use: Not on file    Drug use: Not on file    Sexual activity: Not on file       Review of Systems   Unable to perform ROS: Mental status change     Objective:     Vital Signs (Most Recent):  Temp: 98.9 °F (37.2 °C) (02/28/24 0305)  Pulse: 94 (02/28/24 0600)  Resp: (!) 34 (02/28/24 0600)  BP: 128/75 (02/28/24 0600)  SpO2: 96 % (02/28/24 0600) Vital Signs (24h Range):  Temp:  [98.5 °F (36.9 °C)-99.2 °F (37.3 °C)] 98.9 °F (37.2 °C)  Pulse:  [85-95] 94  Resp:  [23-74] 34  SpO2:  [94 %-100 %] 96 %  BP: (109-165)/(55-79) 128/75     Weight: (!) 137.9 kg (304 lb)  Body mass index is 50.59 kg/m².       Physical Exam  Constitutional:       Appearance: She is obese.   HENT:      Head: Normocephalic and atraumatic.      Right Ear: External ear normal.      Left Ear: External ear normal.      Nose: Nose normal.      Mouth/Throat:      Mouth: Mucous membranes are dry.   Eyes:      Conjunctiva/sclera: Conjunctivae normal.   Cardiovascular:      Rate and Rhythm: Normal rate.   Pulmonary:      Breath sounds: Normal breath sounds.      Comments: On spontaneous breathing trial, ETT connected to ventilator  Abdominal:      General: Bowel sounds are normal.      Palpations: Abdomen is soft.   Musculoskeletal:         General: Swelling present.   Lymphadenopathy:      Cervical: No cervical adenopathy.   Skin:     General: Skin is warm and dry.   Neurological:      Comments: Not following commands despite no sedation            Review of Symptoms      Symptom Assessment (ESAS 0-10 Scale)  Unable to complete assessment due to Mental status change         Pain Assessment in Advanced Demential  Scale (PAINAD)   Breathing - Independent of vocalization:  0  Negative vocalization:  0  Facial expression:  0  Body language:  0  Consolability:  0  Total:  0    Living Arrangements:  Lives with spouse    Psychosocial/Cultural:   See Palliative Psychosocial Note: No  Lives with long-time partner - not legally , together for 26 to 28 years. Has adult daughter (age 31) and adult son, who is in [facility]. Has five grandchildren. Enjoyed watching movies, going out to eat, shopping. Worked as a patient caregiver for blind patients.  **Primary  to Follow**  Palliative Care  Consult: No    Spiritual:  F - Belen and Belief:  Believes in God  I - Importance:  Important  C - Community:  Does not attend Sikh  A - Address in Care:  Engage  support        Advance Care Planning   Advance Directives:   Living Will: No    LaPOST: No    Do Not Resuscitate Status: No    Medical Power of : No      Decision Making:  Family answered questions  Goals of Care: What is most important right now is to focus on curative/life-prolongation (regardless of treatment burdens). Accordingly, we have decided that the best plan to meet the patient's goals includes continuing with treatment.         Significant Labs: CBC:   Recent Labs   Lab 02/27/24 0457 02/28/24 0309   WBC 10.95 11.94   HGB 7.9* 8.0*   HCT 25.9* 25.8*   * 554*       CMP:   Recent Labs   Lab 02/27/24 0457 02/28/24 0309    136   K 4.4 4.6    102   CO2 20* 21*   * 162*   BUN 42* 58*   CREATININE 3.2* 4.1*   CALCIUM 9.4 9.5   PROT 7.5 7.7   ALBUMIN 2.2* 2.3*   BILITOT 0.2 0.2   ALKPHOS 166* 156*   AST 70* 56*   ALT 41 41   ANIONGAP 12 13       CBC:   Recent Labs   Lab 02/28/24 0309   WBC 11.94   HGB 8.0*   HCT 25.8*   MCV 90   *       BMP:  Recent Labs   Lab 02/28/24 0309   *      K 4.6      CO2 21*   BUN 58*   CREATININE 4.1*   CALCIUM 9.5   MG 2.1       LFT:  Lab Results   Component  Value Date    AST 56 (H) 02/28/2024    ALKPHOS 156 (H) 02/28/2024    BILITOT 0.2 02/28/2024     Albumin:   Albumin   Date Value Ref Range Status   02/28/2024 2.3 (L) 3.5 - 5.2 g/dL Final     Protein:   Total Protein   Date Value Ref Range Status   02/28/2024 7.7 6.0 - 8.4 g/dL Final     Lactic acid:   Lab Results   Component Value Date    LACTATE 1.5 02/01/2024    LACTATE 2.4 (H) 01/31/2024       Significant Imaging: I have reviewed all pertinent imaging results/findings within the past 24 hours.

## 2024-02-28 NOTE — ASSESSMENT & PLAN NOTE
Transfer from [facility]. Admitted for fourniers gangrene. Initiated HD on 1/31. ALVARADO 2/2 ATN in setting of septic shock. Arrived with CR 3.8. Unknown baseline. S/P OR debridement with possible closure and wound vac placement     ALVARADO most likley ATN in setting of septic shock     Plan/Recommendation  - No urgent need for RRT, will evaluate juan on daily basis.   - DC Iv lasix pushes; start Lasix gtt @ 5mg/hr.   - Please consult IR for perm cath placement if clear from infection stand point.   - Please dose all ABX in accordance to RRT schedule (zosyn, etc)  -Daily RFP   -Strict I&Os  -Avoid nephrotoxins, if possible

## 2024-02-28 NOTE — CARE UPDATE
-Glucose Goal 140-180    -A1C:   Hemoglobin A1C   Date Value Ref Range Status   01/30/2024 10.4 (H) 4.0 - 5.6 % Final     Comment:     ADA Screening Guidelines:  5.7-6.4%  Consistent with prediabetes  >or=6.5%  Consistent with diabetes    High levels of fetal hemoglobin interfere with the HbA1C  assay. Heterozygous hemoglobin variants (HbS, HgC, etc)do  not significantly interfere with this assay.   However, presence of multiple variants may affect accuracy.           -HOME REGIMEN:     -GLUCOSE TREND FOR THE PAST 24HRS:   Recent Labs   Lab 02/27/24  0738 02/27/24  1128 02/27/24  1618 02/27/24  1929 02/27/24  2320 02/28/24  0304   POCTGLUCOSE 157* 158* 184* 167* 147* 177*           -NO HYPOGYCEMIAS NOTED     - Diet  Diet NPO    BG goal 140-180  No previous hx of T2DM per family at bedside. A1c of 10.4. DKA at OSH. On TF. BG stable on regimen.          Plan:  - Continue Levemir 14 units daily.  - Continue Novolog 6 units q 4 hrs while on tube feeding (HOLD if tube feeding is stopped or BG < 100)   - Continue Moderate Dose Correction Scale  - BG monitoring q 4 hrs while NPO /TF     ** Please call Endocrine for any BG related issues **

## 2024-02-28 NOTE — PLAN OF CARE
Recommendations     1.) TF recs: continue with Peptamen VHP @50ml/hr to provide 1200 kcal, 110g PRO, and 1008ml FF- FWF per MD.                 - if diarrhea is persistent, continue to add psyllium husk BID to help bulk up stools.      2.)  Monitor for s/s of intolerance such as residuals >500ml, n/v/d, or abdominal distension.      3.)  RD to monitor tolerance, skin, labs, wt.         Goals: Meet % EEN/EPN by follow-up date.  Nutrition Goal Status: goal met  Communication of RD Recs:  (POC)

## 2024-02-28 NOTE — SUBJECTIVE & OBJECTIVE
Interval History:     No acute events overnight. Patient mentation unchanged.     Review of patient's allergies indicates:  No Known Allergies  Current Facility-Administered Medications   Medication Frequency    0.9%  NaCl infusion PRN    0.9%  NaCl infusion Once    acetaminophen tablet 650 mg Q4H PRN    albuterol-ipratropium 2.5 mg-0.5 mg/3 mL nebulizer solution 3 mL Q4H PRN    amLODIPine tablet 10 mg Daily    carvediloL tablet 25 mg BID    dextrose 10% bolus 125 mL 125 mL PRN    dextrose 10% bolus 250 mL 250 mL PRN    furosemide (Lasix) 500 mg in 50 mL infusion (conc: 10 mg/mL) Continuous    glucagon (human recombinant) injection 1 mg PRN    glucose chewable tablet 16 g PRN    glucose chewable tablet 24 g PRN    heparin (porcine) injection 7,500 Units Q8H    hydrALAZINE tablet 25 mg Q8H PRN    insulin aspart U-100 pen 0-10 Units Q4H PRN    insulin aspart U-100 pen 6 Units Q4H    insulin detemir U-100 (Levemir) pen 14 Units Daily    labetalol 20 mg/4 mL (5 mg/mL) IV syring Q6H PRN    naloxone 0.4 mg/mL injection 0.02 mg PRN    ondansetron injection 4 mg Q6H PRN    prochlorperazine injection Soln 5 mg Q6H PRN    psyllium husk (aspartame) 3.4 gram packet 1 packet Daily    sevelamer carbonate pwpk 1.6 g TID    sodium chloride 0.9% bolus 250 mL 250 mL PRN    sodium chloride 0.9% flush 10 mL PRN       Objective:     Vital Signs (Most Recent):  Temp: 98.8 °F (37.1 °C) (02/28/24 0800)  Pulse: 90 (02/28/24 1138)  Resp: (!) 23 (02/28/24 1138)  BP: (!) 155/72 (02/28/24 0909)  SpO2: 98 % (02/28/24 1138) Vital Signs (24h Range):  Temp:  [98.5 °F (36.9 °C)-99.2 °F (37.3 °C)] 98.8 °F (37.1 °C)  Pulse:  [87-95] 90  Resp:  [23-74] 23  SpO2:  [95 %-100 %] 98 %  BP: (109-165)/(55-75) 155/72     Weight: (!) 137.9 kg (304 lb) (02/28/24 0300)  Body mass index is 50.59 kg/m².  Body surface area is 2.51 meters squared.    I/O last 3 completed shifts:  In: 1810 [NG/GT:1810]  Out: 625 [Urine:600; Other:25]     Physical Exam  Vitals and  nursing note reviewed.   Constitutional:       General: She is not in acute distress.     Appearance: She is ill-appearing.   HENT:      Head: Normocephalic and atraumatic.   Eyes:      Comments: Not opening eyes to command    Neck:      Comments: Left IJ Trialysis catheter  Cardiovascular:      Rate and Rhythm: Normal rate and regular rhythm.   Pulmonary:      Effort: Pulmonary effort is normal.      Comments: trached    Abdominal:      General: There is no distension.      Palpations: Abdomen is soft.      Tenderness: There is no abdominal tenderness.      Comments: PEG in place   Genitourinary:     Comments: Glynn in place    Skin:     General: Skin is warm and dry.      Comments: Wound vac in place to R thigh/groin to suction  Re approximated skin clean dry and intact   Neurological:      Comments: Occasionally withdraws from painful stimuli. No significant purposeful activity           Significant Labs:  All labs within the past 24 hours have been reviewed.     Significant Imaging:  Labs: Reviewed

## 2024-02-28 NOTE — PLAN OF CARE
SICU PLAN OF CARE    Dx: Ayaz's gangrene    Goals of Care: MAP >65, SBP <160    Vital Signs (last 12 hours):   Temp:  [98.5 °F (36.9 °C)-98.9 °F (37.2 °C)]   Pulse:  [87-94]   Resp:  [23-34]   BP: (109-165)/(55-74)   SpO2:  [95 %-100 %]      Neuro: Withdraws to noxious stimuli     Cardiac: NSR    Respiratory: Tracheostomy Collar; 5L, 28% FiO2    Gtts: n/a    Urine Output: External Catheter  600 mL/shift    Drains: Wound Vac, total output 75mL/shift    Diet: Tube Feeds at 50mL/hour     Labs/Accuchecks: all labs trended and reviewed, accuchecks Q4    Skin:  All skin remains free from new injury, foams and heel boots in place,  patient turned q2h, mattress inflated, and bed working correctly.    Shift Events:  Plan of care ongoing, VSS, no s/s of distress, bed in lowest position, side rails x3.  See flowsheet for further assessment/details.  Family updated on current condition/plan of care, questions answered, and emotional support provided.  MD updated on current condition, vitals, labs, and gtts.

## 2024-02-28 NOTE — PROGRESS NOTES
[facility] - Surgical Intensive Care  Nephrology  Progress Note    Patient Name: Sarah Saravia  MRN: [id number]  Admission Date: 1/29/2024  Hospital Length of Stay: 30 days  Attending Provider: Steve Cole MD   Primary Care Physician: [facility]  Principal Problem:Ayaz's gangrene    Subjective:     HPI: Sarah Saravia is a 53 year old female with a past medical history of obesity (BMI 53) admitted with N/V and R groin wound found to be in DKA at OSH.  She underwent a CT abdomen and pelvis that showed extensive soft tissue air throughout the right groin and inguinal region and extending to involve the right lower quadrant anterior abdominal wall with extensive soft tissue air also seen involving the proximal medial aspect of the right thigh, concerning for gas-forming infection. She is s/p OR debridement for necrotizing fascitis on 1/29/24 and take back for 1/31/24. Right groin tissue growing proteus, susceptibilities still pending. Currently on meropenem and clindamycin which was d/c'd on 1/31/24. While at OSH pt developed acute respiratory failure requiring intubation. Nephrology was consulted for worsening alvaardo in the setting of lactic acidosis and septic shock likely ATN, sCr elevated at 3.8 on admit.  OR today for debridement with possible closure and wound vac placement. Last HD 2/1. Net -1.3L. Electrolytes stable. Nephrology consulted for ALVARADO.     Interval History:     No acute events overnight. Patient mentation unchanged.     Review of patient's allergies indicates:  No Known Allergies  Current Facility-Administered Medications   Medication Frequency    0.9%  NaCl infusion PRN    0.9%  NaCl infusion Once    acetaminophen tablet 650 mg Q4H PRN    albuterol-ipratropium 2.5 mg-0.5 mg/3 mL nebulizer solution 3 mL Q4H PRN    amLODIPine tablet 10 mg Daily    carvediloL tablet 25 mg BID    dextrose 10% bolus 125 mL 125 mL PRN    dextrose 10% bolus 250 mL 250 mL PRN    furosemide  (Lasix) 500 mg in 50 mL infusion (conc: 10 mg/mL) Continuous    glucagon (human recombinant) injection 1 mg PRN    glucose chewable tablet 16 g PRN    glucose chewable tablet 24 g PRN    heparin (porcine) injection 7,500 Units Q8H    hydrALAZINE tablet 25 mg Q8H PRN    insulin aspart U-100 pen 0-10 Units Q4H PRN    insulin aspart U-100 pen 6 Units Q4H    insulin detemir U-100 (Levemir) pen 14 Units Daily    labetalol 20 mg/4 mL (5 mg/mL) IV syring Q6H PRN    naloxone 0.4 mg/mL injection 0.02 mg PRN    ondansetron injection 4 mg Q6H PRN    prochlorperazine injection Soln 5 mg Q6H PRN    psyllium husk (aspartame) 3.4 gram packet 1 packet Daily    sevelamer carbonate pwpk 1.6 g TID    sodium chloride 0.9% bolus 250 mL 250 mL PRN    sodium chloride 0.9% flush 10 mL PRN       Objective:     Vital Signs (Most Recent):  Temp: 98.8 °F (37.1 °C) (02/28/24 0800)  Pulse: 90 (02/28/24 1138)  Resp: (!) 23 (02/28/24 1138)  BP: (!) 155/72 (02/28/24 0909)  SpO2: 98 % (02/28/24 1138) Vital Signs (24h Range):  Temp:  [98.5 °F (36.9 °C)-99.2 °F (37.3 °C)] 98.8 °F (37.1 °C)  Pulse:  [87-95] 90  Resp:  [23-74] 23  SpO2:  [95 %-100 %] 98 %  BP: (109-165)/(55-75) 155/72     Weight: (!) 137.9 kg (304 lb) (02/28/24 0300)  Body mass index is 50.59 kg/m².  Body surface area is 2.51 meters squared.    I/O last 3 completed shifts:  In: 1810 [NG/GT:1810]  Out: 625 [Urine:600; Other:25]     Physical Exam  Vitals and nursing note reviewed.   Constitutional:       General: She is not in acute distress.     Appearance: She is ill-appearing.   HENT:      Head: Normocephalic and atraumatic.   Eyes:      Comments: Not opening eyes to command    Neck:      Comments: Left IJ Trialysis catheter  Cardiovascular:      Rate and Rhythm: Normal rate and regular rhythm.   Pulmonary:      Effort: Pulmonary effort is normal.      Comments: trached    Abdominal:      General: There is no distension.      Palpations: Abdomen is soft.      Tenderness: There is no  abdominal tenderness.      Comments: PEG in place   Genitourinary:     Comments: Glynn in place    Skin:     General: Skin is warm and dry.      Comments: Wound vac in place to R thigh/groin to suction  Re approximated skin clean dry and intact   Neurological:      Comments: Occasionally withdraws from painful stimuli. No significant purposeful activity           Significant Labs:  All labs within the past 24 hours have been reviewed.     Significant Imaging:  Labs: Reviewed  Assessment/Plan:     Neuro  Cerebral infarction  Per primary team.     Renal/  * Ayaz's gangrene  - management per primary     ALVARADO (acute kidney injury)  Transfer from [facility]. Admitted for fourniers gangrene. Initiated HD on 1/31. ALVARADO 2/2 ATN in setting of septic shock. Arrived with CR 3.8. Unknown baseline. S/P OR debridement with possible closure and wound vac placement     ALVARADO most likley ATN in setting of septic shock     Plan/Recommendation  - No urgent need for RRT, will evaluate juan on daily basis.   - DC Iv lasix pushes; start Lasix gtt @ 5mg/hr.   - Please consult IR for perm cath placement if clear from infection stand point.   - Please dose all ABX in accordance to RRT schedule (zosyn, etc)  -Daily RFP   -Strict I&Os  -Avoid nephrotoxins, if possible         Thank you for your consult. I will follow-up with patient. Please contact us if you have any additional questions.    Case discussed with attending. Attestation to follow.       Antione Hazel DO  Nephrology  [facility] - [facility]

## 2024-02-28 NOTE — PLAN OF CARE
Problem: Infection  Goal: Absence of Infection Signs and Symptoms  Outcome: Ongoing, Progressing  Intervention: Prevent or Manage Infection  Flowsheets (Taken 2/28/2024 1620)  Infection Management: aseptic technique maintained  Isolation Precautions: precautions maintained     Problem: Fall Injury Risk  Goal: Absence of Fall and Fall-Related Injury  Outcome: Ongoing, Progressing  Intervention: Identify and Manage Contributors  Flowsheets (Taken 2/28/2024 1618)  Medication Review/Management:   medications reviewed   high-risk medications identified  Intervention: Promote Injury-Free Environment  Flowsheets (Taken 2/28/2024 1618)  Safety Promotion/Fall Prevention:   assistive device/personal item within reach   Fall Risk signage in place   Fall Risk reviewed with patient/family   bed alarm set   side rails raised x 3   high risk medications identified   lighting adjusted   medications reviewed

## 2024-02-28 NOTE — PROGRESS NOTES
[facility]  General Surgery  Progress Note    Subjective:     History of Present Illness:  53 year old morbidly obese female who does not routinely go to the doctor presents to the ED with malaise, nausea, vomiting, and groin, buttock, perineal swelling and tenderness. She began feeling ill a few days ago.     On arrival to the ED she is tachycardic to 120, hypertensive without fever. Labs demonstrate leukocytosis of 21, , with lactic acidosis and associated ALVARADO with cr 3.8, hyperglycemia with blood glucose of 824 accompanied by severe electrolyte derangements. CT imaging obtained demonstrates diffuse subcutaneous air along buttocks, perineum, anterior vulva, as well as bilateral thighs.     No prior abdominal surgeries. She denies problems with her heart or lungs. Non smoker. Does not routinely take any medications.    Post-Op Info:  Procedure(s) (LRB):  CREATION, TRACHEOSTOMY (N/A)  EGD, WITH PEG TUBE INSERTION (N/A)  CLOSURE, WOUND (Right)  REPLACEMENT, WOUND VAC (Right)   6 Days Post-Op     Interval History: NAEO. VSS. Patient stable on trach collar. Plan for HD today. IR to place tunneled HD catheter tomorrow.     Medications:  Continuous Infusions:  Scheduled Meds:   sodium chloride 0.9%   Intravenous Once    amLODIPine  10 mg Per G Tube Daily    carvediloL  25 mg Per G Tube BID    furosemide (LASIX) injection  120 mg Intravenous TID    heparin (porcine)  7,500 Units Subcutaneous Q8H    insulin aspart U-100  6 Units Subcutaneous Q4H    insulin detemir U-100  14 Units Subcutaneous Daily    psyllium husk (aspartame)  1 packet Per G Tube Daily    sevelamer carbonate  1.6 g Per G Tube TID     PRN Meds:sodium chloride 0.9%, acetaminophen, albuterol-ipratropium, dextrose 10%, dextrose 10%, glucagon (human recombinant), glucose, glucose, hydrALAZINE, insulin aspart U-100, labetalol, naloxone, ondansetron, prochlorperazine, sodium chloride 0.9%, sodium chloride 0.9%     Review of  patient's allergies indicates:  No Known Allergies  Objective:     Vital Signs (Most Recent):  Temp: 98.9 °F (37.2 °C) (02/28/24 0305)  Pulse: 87 (02/28/24 0909)  Resp: (!) 32 (02/28/24 0827)  BP: (!) 155/72 (02/28/24 0909)  SpO2: 95 % (02/28/24 0827) Vital Signs (24h Range):  Temp:  [98.5 °F (36.9 °C)-99.2 °F (37.3 °C)] 98.9 °F (37.2 °C)  Pulse:  [85-95] 87  Resp:  [23-74] 32  SpO2:  [94 %-100 %] 95 %  BP: (109-165)/(55-75) 155/72     Weight: (!) 137.9 kg (304 lb)  Body mass index is 50.59 kg/m².    Intake/Output - Last 3 Shifts         02/26 0700  02/27 0659 02/27 0700 02/28 0659 02/28 0700  02/29 0659    NG/GT 1170 1450     Total Intake(mL/kg) 1170 (8.3) 1450 (10.5)     Urine (mL/kg/hr) 0 (0) 600 (0.2)     Other 1125      Stool 0 0     Total Output 1125 600     Net +45 +850            Urine Occurrence 2 x 6 x     Stool Occurrence 1 x 2 x              Physical Exam  Vitals and nursing note reviewed.   Constitutional:       General: She is not in acute distress.     Appearance: She is ill-appearing.   HENT:      Head: Normocephalic and atraumatic.   Eyes:      Comments: Not opening eyes to command    Neck:      Comments: Left IJ Trialysis catheter  Cardiovascular:      Rate and Rhythm: Normal rate and regular rhythm.   Pulmonary:      Effort: Pulmonary effort is normal.      Comments: trached    Abdominal:      General: There is no distension.      Palpations: Abdomen is soft.      Tenderness: There is no abdominal tenderness.      Comments: PEG in place   Genitourinary:     Comments: Glynn in place    Skin:     General: Skin is warm and dry.      Comments: Wound vac in place to R thigh/groin to suction  Re approximated skin clean dry and intact   Neurological:      Comments: Occasionally withdraws from painful stimuli. No significant purposeful activity           Significant Labs:  I have reviewed all pertinent lab results within the past 24 hours.  CBC:   Recent Labs   Lab 02/28/24  0309   WBC 11.94   RBC 2.86*    HGB 8.0*   HCT 25.8*   *   MCV 90   MCH 28.0   MCHC 31.0*     BMP:   Recent Labs   Lab 02/28/24  0309   *      K 4.6      CO2 21*   BUN 58*   CREATININE 4.1*   CALCIUM 9.5   MG 2.1       Significant Diagnostics:  I have reviewed all pertinent imaging results/findings within the past 24 hours.  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 Unfortunately, has multiple infarcts on MRI brain with unchanged neuro status, however, family would like to proceed with all measures. S/p trach, peg, and lower wound closure, replacement of wound vac in right groin on 2/22/24.     - Wound vac changed 2/26. Wound care to change next wound vac.   - HD today  - IR to place line tomorrow   - LP 2/16 - NGTD  - ID consulted for urine cultures -  zosyn    - UA growing Burkholderia species and Chryseibacterium Indologenes, recommend IV zosyn with stop date of 2/22  - Palliative following, full code  - Stable on trach collar  - Okay for DVT ppx   - Remainder of care per SICU    Dispo: LTAC placement once line in place        Celia Allison MD  General Surgery  [facility]

## 2024-02-28 NOTE — SUBJECTIVE & OBJECTIVE
Interval History/Significant Events: NAEON. Planning for iHD today. Pending dispo.    Follow-up For: Procedure(s) (LRB):  CREATION, TRACHEOSTOMY (N/A)  EGD, WITH PEG TUBE INSERTION (N/A)  CLOSURE, WOUND (Right)  REPLACEMENT, WOUND VAC (Right)    Post-Operative Day: 6 Days Post-Op    Objective:     Vital Signs (Most Recent):  Temp: 98.9 °F (37.2 °C) (02/28/24 0305)  Pulse: 88 (02/28/24 0500)  Resp: (!) 25 (02/28/24 0500)  BP: (!) 118/57 (02/28/24 0500)  SpO2: 97 % (02/28/24 0500) Vital Signs (24h Range):  Temp:  [98.5 °F (36.9 °C)-99.2 °F (37.3 °C)] 98.9 °F (37.2 °C)  Pulse:  [85-97] 88  Resp:  [19-74] 25  SpO2:  [94 %-100 %] 97 %  BP: (109-165)/(55-79) 118/57     Weight: (!) 137.9 kg (304 lb)  Body mass index is 50.59 kg/m².      Intake/Output Summary (Last 24 hours) at 2/28/2024 0609  Last data filed at 2/28/2024 0500  Gross per 24 hour   Intake 1400 ml   Output 600 ml   Net 800 ml          Physical Exam  Vitals and nursing note reviewed.   Constitutional:       General: She is not in acute distress.     Appearance: She is ill-appearing.   HENT:      Head: Normocephalic and atraumatic.   Eyes:      Comments: Not opening eyes to command    Neck:      Comments: Left IJ Trialysis catheter  Cardiovascular:      Rate and Rhythm: Normal rate and regular rhythm.   Pulmonary:      Effort: Pulmonary effort is normal.      Comments: Trach  Abdominal:      General: There is no distension.      Palpations: Abdomen is soft.      Tenderness: There is no abdominal tenderness.      Comments: PEG in place   Genitourinary:     Comments: Glynn in place    Skin:     General: Skin is warm and dry.      Comments: Wound vac in place to R thigh/groin to suction  Re approximated skin clean dry and intact   Neurological:      Comments: Occasional withdrawal from painful stimuli. No significant purposeful activity             Vents:  Vent Mode: Spont (02/23/24 0708)  Set Rate: 18 BPM (02/06/24 0349)  Vt Set: 420 mL (02/06/24 0349)  Pressure  Support: 10 cmH20 (02/23/24 0708)  PEEP/CPAP: 8 cmH20 (02/23/24 0708)  Oxygen Concentration (%): 28 (02/28/24 0500)  Peak Airway Pressure: 43 cmH20 (02/23/24 0708)  Plateau Pressure: 15 cmH20 (02/23/24 0708)  Total Ve: 5.16 L/m (02/23/24 0708)  Negative Inspiratory Force (cm H2O): 0 (02/23/24 0708)  F/VT Ratio<105 (RSBI): (!) 32.33 (02/23/24 0319)    Lines/Drains/Airways       Central Venous Catheter Line  Duration             Trialysis (Dialysis) Catheter 02/11/24 2303 left internal jugular 16 days              Drain  Duration                  Gastrostomy/Enterostomy 02/22/24 1200 LUQ 5 days         Open Drain 02/22/24 1414 Tube - 1 Right Thigh Penrose Other (see comments) 5 days              Airway  Duration             Adult Surgical Airway 02/22/24 Shiley Cuffed 8.0 / 85 mm 6 days              Peripheral Intravenous Line  Duration                  Peripheral IV - Single Lumen 02/25/24 1601 18 G Left Antecubital 2 days                    Significant Labs:    CBC/Anemia Profile:  Recent Labs   Lab 02/27/24  0457 02/28/24  0309   WBC 10.95 11.94   HGB 7.9* 8.0*   HCT 25.9* 25.8*   * 554*   MCV 91 90   RDW 17.2* 17.2*        Chemistries:  Recent Labs   Lab 02/27/24  0457 02/28/24  0309    136   K 4.4 4.6    102   CO2 20* 21*   BUN 42* 58*   CREATININE 3.2* 4.1*   CALCIUM 9.4 9.5   ALBUMIN 2.2* 2.3*   PROT 7.5 7.7   BILITOT 0.2 0.2   ALKPHOS 166* 156*   ALT 41 41   AST 70* 56*   MG 2.1 2.1   PHOS 3.6 4.7*       All pertinent labs within the past 24 hours have been reviewed.    Significant Imaging:  I have reviewed all pertinent imaging results/findings within the past 24 hours.

## 2024-02-28 NOTE — PROGRESS NOTES
"[facility]  Adult Nutrition  Progress Note    SUMMARY       Recommendations    1.) TF recs: continue with Peptamen VHP @50ml/hr to provide 1200 kcal, 110g PRO, and 1008ml FF- FWF per MD.                 - if diarrhea is persistent, continue to add psyllium husk BID to help bulk up stools.      2.)  Monitor for s/s of intolerance such as residuals >500ml, n/v/d, or abdominal distension.      3.)  RD to monitor tolerance, skin, labs, wt.       Goals: Meet % EEN/EPN by follow-up date.  Nutrition Goal Status: goal met  Communication of RD Recs:  (POC)    Assessment and Plan    Nutrition Problem  Inadequate oral intake     Related to (etiology):   Diagnosis related symptoms     Signs and Symptoms (as evidenced by):   Intubated  NPO     Interventions/Recommendations (treatment strategy):  Collaboration with medical providers     Nutrition Diagnosis Status:   Continues    Reason for Assessment    Reason For Assessment: RD follow-up  Diagnosis:  (ros's gangrene)  Relevant Medical History: obesity, T2DM, CKD  Interdisciplinary Rounds: did not attend    General Information Comments: RD f/u: pt resting comfortably, with TF running at goal. No residuals per pt's RN. Some diarrhea was noted overnight. Pt is to be re-start HD, possibly today. Mild edema present. Wound Vac present. RD team to continue to monitor.    Nutrition Discharge Planning: pending medical course    Nutrition Risk Screen    Nutrition Risk Screen: tube feeding or parenteral nutrition    Nutrition/Diet History    Food Allergies: NKFA    Anthropometrics    Temp: 98.9 °F (37.2 °C)  Height Method: Stated  Height: 5' 5" (165.1 cm)  Height (inches): 65 in  Weight Method: Bed Scale  Weight: (!) 137.9 kg (304 lb)  Weight (lb): (!) 304 lb  Ideal Body Weight (IBW), Female: 125 lb  % Ideal Body Weight, Female (lb): 285.6 %  BMI (Calculated): 50.6  BMI Grade: greater than 40 - morbid obesity    Lab/Procedures/Meds    Pertinent Labs " Reviewed: reviewed  Pertinent Labs Comments: BUN: 58, cr: 4.1, GFR: 12.4, gluc: 162, ALP: 156, alb: 2.3, AST: 56    Pertinent Medications Reviewed: reviewed  Pertinent Medications Comments: amlodipine, lasix, heparin, insulin, psyllium husk, sevelamer, NaCl    Estimated/Assessed Needs    Weight Used For Calorie Calculations: 57 kg (125 lb 10.6 oz) (IBW d/t BMI >50.0)  Energy Calorie Requirements (kcal): 1254- 1425 kcal  Energy Need Method: Kcal/kg (22-25 kcal/kg of IBW)    Protein Requirements: 114- 143g (2.0-2.5g/kg)  Weight Used For Protein Calculations: 57 kg (125 lb 10.6 oz) (IBW d/t BMI >50.0)    Estimated Fluid Requirement Method: RDA Method  RDA Method (mL): 1254    Nutrition Prescription Ordered    Current Diet Order: NPO  Current Nutrition Support Formula Ordered: Peptamen Intense VHP  Current Nutrition Support Rate Ordered: 50 (ml)    Evaluation of Received Nutrient/Fluid Intake    Enteral Calories (kcal): 1200  Enteral Protein (gm): 110  Enteral (Free Water) Fluid (mL): 1008  I/O: -798ml since 2/19  Energy Calories Required: meeting needs  Protein Required: meeting needs  Fluid Required:  (as per MD)  Comments: LBM 2/28 (diarrhea)  Tolerance: tolerating  % Intake of Estimated Energy Needs: 75 - 100 %  % Meal Intake: NPO    Nutrition Risk    Level of Risk/Frequency of Follow-up:  (RD to f/u x 1-2/week)     Monitor and Evaluation    Food and Nutrient Intake: enteral nutrition intake, parenteral nutrition intake  Food and Nutrient Adminstration: enteral and parenteral nutrition administration  Knowledge/Beliefs/Attitudes: food and nutrition knowledge/skill, beliefs and attitudes  Physical Activity and Function: nutrition-related ADLs and IADLs, factors affecting access to physical activity  Anthropometric Measurements: weight, weight change, body mass index  Biochemical Data, Medical Tests and Procedures: electrolyte and renal panel  Nutrition-Focused Physical Findings: overall appearance     Nutrition  Follow-Up    RD Follow-up?: Yes

## 2024-02-29 ENCOUNTER — ANESTHESIA EVENT (OUTPATIENT)
Dept: INTERVENTIONAL RADIOLOGY/VASCULAR | Facility: HOSPITAL | Age: 54
DRG: 004 | End: 2024-02-29
Payer: MEDICAID

## 2024-02-29 PROBLEM — I63.10 CEREBROVASCULAR ACCIDENT (CVA) DUE TO EMBOLISM OF PRECEREBRAL ARTERY: Status: ACTIVE | Noted: 2024-02-29

## 2024-02-29 LAB
ALBUMIN SERPL BCP-MCNC: 2.4 G/DL (ref 3.5–5.2)
ALP SERPL-CCNC: 152 U/L (ref 55–135)
ALT SERPL W/O P-5'-P-CCNC: 38 U/L (ref 10–44)
ANION GAP SERPL CALC-SCNC: 12 MMOL/L (ref 8–16)
AST SERPL-CCNC: 48 U/L (ref 10–40)
BASOPHILS # BLD AUTO: 0.06 K/UL (ref 0–0.2)
BASOPHILS NFR BLD: 0.5 % (ref 0–1.9)
BILIRUB SERPL-MCNC: 0.2 MG/DL (ref 0.1–1)
BUN SERPL-MCNC: 75 MG/DL (ref 6–20)
CALCIUM SERPL-MCNC: 9.5 MG/DL (ref 8.7–10.5)
CHLORIDE SERPL-SCNC: 101 MMOL/L (ref 95–110)
CO2 SERPL-SCNC: 22 MMOL/L (ref 23–29)
CREAT SERPL-MCNC: 3.8 MG/DL (ref 0.5–1.4)
DIFFERENTIAL METHOD BLD: ABNORMAL
EOSINOPHIL # BLD AUTO: 0.6 K/UL (ref 0–0.5)
EOSINOPHIL NFR BLD: 5.4 % (ref 0–8)
ERYTHROCYTE [DISTWIDTH] IN BLOOD BY AUTOMATED COUNT: 17.2 % (ref 11.5–14.5)
EST. GFR  (NO RACE VARIABLE): 13.6 ML/MIN/1.73 M^2
GLUCOSE SERPL-MCNC: 141 MG/DL (ref 70–110)
HCT VFR BLD AUTO: 26 % (ref 37–48.5)
HGB BLD-MCNC: 8.2 G/DL (ref 12–16)
IMM GRANULOCYTES # BLD AUTO: 0.08 K/UL (ref 0–0.04)
IMM GRANULOCYTES NFR BLD AUTO: 0.7 % (ref 0–0.5)
LYMPHOCYTES # BLD AUTO: 2.2 K/UL (ref 1–4.8)
LYMPHOCYTES NFR BLD: 19.1 % (ref 18–48)
MAGNESIUM SERPL-MCNC: 2.1 MG/DL (ref 1.6–2.6)
MCH RBC QN AUTO: 28.6 PG (ref 27–31)
MCHC RBC AUTO-ENTMCNC: 31.5 G/DL (ref 32–36)
MCV RBC AUTO: 91 FL (ref 82–98)
MONOCYTES # BLD AUTO: 1.2 K/UL (ref 0.3–1)
MONOCYTES NFR BLD: 10.3 % (ref 4–15)
NEUTROPHILS # BLD AUTO: 7.4 K/UL (ref 1.8–7.7)
NEUTROPHILS NFR BLD: 64 % (ref 38–73)
NRBC BLD-RTO: 0 /100 WBC
PHOSPHATE SERPL-MCNC: 5.7 MG/DL (ref 2.7–4.5)
PLATELET # BLD AUTO: 582 K/UL (ref 150–450)
PMV BLD AUTO: 9.8 FL (ref 9.2–12.9)
POCT GLUCOSE: 137 MG/DL (ref 70–110)
POCT GLUCOSE: 155 MG/DL (ref 70–110)
POCT GLUCOSE: 165 MG/DL (ref 70–110)
POCT GLUCOSE: 168 MG/DL (ref 70–110)
POCT GLUCOSE: 172 MG/DL (ref 70–110)
POTASSIUM SERPL-SCNC: 4.8 MMOL/L (ref 3.5–5.1)
PROT SERPL-MCNC: 8 G/DL (ref 6–8.4)
RBC # BLD AUTO: 2.87 M/UL (ref 4–5.4)
SODIUM SERPL-SCNC: 135 MMOL/L (ref 136–145)
WBC # BLD AUTO: 11.55 K/UL (ref 3.9–12.7)

## 2024-02-29 PROCEDURE — 27000221 HC OXYGEN, UP TO 24 HOURS

## 2024-02-29 PROCEDURE — 99900035 HC TECH TIME PER 15 MIN (STAT)

## 2024-02-29 PROCEDURE — 25000003 PHARM REV CODE 250

## 2024-02-29 PROCEDURE — 99233 SBSQ HOSP IP/OBS HIGH 50: CPT | Mod: ,,, | Performed by: INTERNAL MEDICINE

## 2024-02-29 PROCEDURE — 63600175 PHARM REV CODE 636 W HCPCS: Performed by: INTERNAL MEDICINE

## 2024-02-29 PROCEDURE — 99024 POSTOP FOLLOW-UP VISIT: CPT | Mod: ,,, | Performed by: STUDENT IN AN ORGANIZED HEALTH CARE EDUCATION/TRAINING PROGRAM

## 2024-02-29 PROCEDURE — 85025 COMPLETE CBC W/AUTO DIFF WBC: CPT

## 2024-02-29 PROCEDURE — 20600001 HC STEP DOWN PRIVATE ROOM

## 2024-02-29 PROCEDURE — 94761 N-INVAS EAR/PLS OXIMETRY MLT: CPT

## 2024-02-29 PROCEDURE — 27200966 HC CLOSED SUCTION SYSTEM

## 2024-02-29 PROCEDURE — D9220A PRA ANESTHESIA: Mod: CRNA,,, | Performed by: NURSE ANESTHETIST, CERTIFIED REGISTERED

## 2024-02-29 PROCEDURE — 63600175 PHARM REV CODE 636 W HCPCS

## 2024-02-29 PROCEDURE — 25000003 PHARM REV CODE 250: Performed by: NURSE ANESTHETIST, CERTIFIED REGISTERED

## 2024-02-29 PROCEDURE — 25000242 PHARM REV CODE 250 ALT 637 W/ HCPCS

## 2024-02-29 PROCEDURE — 83735 ASSAY OF MAGNESIUM: CPT

## 2024-02-29 PROCEDURE — 27201112

## 2024-02-29 PROCEDURE — 80053 COMPREHEN METABOLIC PANEL: CPT

## 2024-02-29 PROCEDURE — D9220A PRA ANESTHESIA: Mod: ANES,,, | Performed by: ANESTHESIOLOGY

## 2024-02-29 PROCEDURE — 99232 SBSQ HOSP IP/OBS MODERATE 35: CPT | Mod: ,,, | Performed by: NURSE PRACTITIONER

## 2024-02-29 PROCEDURE — 84100 ASSAY OF PHOSPHORUS: CPT

## 2024-02-29 PROCEDURE — 27000207 HC ISOLATION

## 2024-02-29 PROCEDURE — 99291 CRITICAL CARE FIRST HOUR: CPT | Mod: 24,,, | Performed by: STUDENT IN AN ORGANIZED HEALTH CARE EDUCATION/TRAINING PROGRAM

## 2024-02-29 PROCEDURE — 99900026 HC AIRWAY MAINTENANCE (STAT)

## 2024-02-29 PROCEDURE — 63600175 PHARM REV CODE 636 W HCPCS: Performed by: NURSE ANESTHETIST, CERTIFIED REGISTERED

## 2024-02-29 RX ORDER — PROPOFOL 10 MG/ML
VIAL (ML) INTRAVENOUS
Status: DISCONTINUED | OUTPATIENT
Start: 2024-02-29 | End: 2024-02-29

## 2024-02-29 RX ORDER — CEFAZOLIN SODIUM 1 G/3ML
INJECTION, POWDER, FOR SOLUTION INTRAMUSCULAR; INTRAVENOUS
Status: DISCONTINUED | OUTPATIENT
Start: 2024-02-29 | End: 2024-02-29

## 2024-02-29 RX ORDER — ONDANSETRON HYDROCHLORIDE 2 MG/ML
4 INJECTION, SOLUTION INTRAVENOUS DAILY PRN
Status: CANCELLED | OUTPATIENT
Start: 2024-02-29

## 2024-02-29 RX ORDER — HYDROMORPHONE HYDROCHLORIDE 1 MG/ML
0.2 INJECTION, SOLUTION INTRAMUSCULAR; INTRAVENOUS; SUBCUTANEOUS EVERY 5 MIN PRN
Status: CANCELLED | OUTPATIENT
Start: 2024-02-29

## 2024-02-29 RX ORDER — MIDAZOLAM HYDROCHLORIDE 1 MG/ML
INJECTION, SOLUTION INTRAMUSCULAR; INTRAVENOUS
Status: DISCONTINUED | OUTPATIENT
Start: 2024-02-29 | End: 2024-02-29

## 2024-02-29 RX ORDER — HEPARIN SODIUM 1000 [USP'U]/ML
INJECTION, SOLUTION INTRAVENOUS; SUBCUTANEOUS
Status: COMPLETED | OUTPATIENT
Start: 2024-02-29 | End: 2024-02-29

## 2024-02-29 RX ORDER — HALOPERIDOL 5 MG/ML
0.5 INJECTION INTRAMUSCULAR EVERY 10 MIN PRN
Status: CANCELLED | OUTPATIENT
Start: 2024-02-29

## 2024-02-29 RX ORDER — FENTANYL CITRATE 50 UG/ML
INJECTION, SOLUTION INTRAMUSCULAR; INTRAVENOUS
Status: DISCONTINUED | OUTPATIENT
Start: 2024-02-29 | End: 2024-02-29

## 2024-02-29 RX ADMIN — CARVEDILOL 25 MG: 25 TABLET, FILM COATED ORAL at 09:02

## 2024-02-29 RX ADMIN — PSYLLIUM HUSK 1 PACKET: 3.4 POWDER ORAL at 08:02

## 2024-02-29 RX ADMIN — SEVELAMER CARBONATE 1.6 G: 800 POWDER, FOR SUSPENSION ORAL at 09:02

## 2024-02-29 RX ADMIN — AMLODIPINE BESYLATE 10 MG: 10 TABLET ORAL at 08:02

## 2024-02-29 RX ADMIN — HEPARIN SODIUM 4000 UNITS: 1000 INJECTION, SOLUTION INTRAVENOUS; SUBCUTANEOUS at 03:02

## 2024-02-29 RX ADMIN — CARVEDILOL 25 MG: 25 TABLET, FILM COATED ORAL at 08:02

## 2024-02-29 RX ADMIN — PROPOFOL 30 MG: 10 INJECTION, EMULSION INTRAVENOUS at 03:02

## 2024-02-29 RX ADMIN — SODIUM CHLORIDE: 0.9 INJECTION, SOLUTION INTRAVENOUS at 02:02

## 2024-02-29 RX ADMIN — INSULIN ASPART 6 UNITS: 100 INJECTION, SOLUTION INTRAVENOUS; SUBCUTANEOUS at 09:02

## 2024-02-29 RX ADMIN — FENTANYL CITRATE 50 MCG: 50 INJECTION, SOLUTION INTRAMUSCULAR; INTRAVENOUS at 03:02

## 2024-02-29 RX ADMIN — HEPARIN SODIUM 7500 UNITS: 5000 INJECTION INTRAVENOUS; SUBCUTANEOUS at 09:02

## 2024-02-29 RX ADMIN — MIDAZOLAM HYDROCHLORIDE 1 MG: 1 INJECTION, SOLUTION INTRAMUSCULAR; INTRAVENOUS at 03:02

## 2024-02-29 RX ADMIN — SEVELAMER CARBONATE 1.6 G: 800 POWDER, FOR SUSPENSION ORAL at 08:02

## 2024-02-29 RX ADMIN — HEPARIN SODIUM 7500 UNITS: 5000 INJECTION INTRAVENOUS; SUBCUTANEOUS at 06:02

## 2024-02-29 RX ADMIN — HYDRALAZINE HYDROCHLORIDE 25 MG: 25 TABLET, FILM COATED ORAL at 06:02

## 2024-02-29 RX ADMIN — INSULIN ASPART 1 UNITS: 100 INJECTION, SOLUTION INTRAVENOUS; SUBCUTANEOUS at 09:02

## 2024-02-29 RX ADMIN — CEFAZOLIN 2 G: 330 INJECTION, POWDER, FOR SOLUTION INTRAMUSCULAR; INTRAVENOUS at 03:02

## 2024-02-29 NOTE — SUBJECTIVE & OBJECTIVE
Interval History:     No acute events overnight. Mentation unchanged.     Review of patient's allergies indicates:  No Known Allergies  Current Facility-Administered Medications   Medication Frequency    0.9%  NaCl infusion PRN    0.9%  NaCl infusion Once    acetaminophen tablet 650 mg Q4H PRN    albuterol-ipratropium 2.5 mg-0.5 mg/3 mL nebulizer solution 3 mL Q4H PRN    amLODIPine tablet 10 mg Daily    carvediloL tablet 25 mg BID    dextrose 10% bolus 125 mL 125 mL PRN    dextrose 10% bolus 250 mL 250 mL PRN    furosemide (Lasix) 500 mg in 50 mL infusion (conc: 10 mg/mL) Continuous    glucagon (human recombinant) injection 1 mg PRN    glucose chewable tablet 16 g PRN    glucose chewable tablet 24 g PRN    heparin (porcine) injection 7,500 Units Q8H    hydrALAZINE tablet 25 mg Q8H PRN    insulin aspart U-100 pen 0-10 Units Q4H PRN    insulin aspart U-100 pen 6 Units Q4H    insulin detemir U-100 (Levemir) pen 14 Units Daily    labetalol 20 mg/4 mL (5 mg/mL) IV syring Q6H PRN    naloxone 0.4 mg/mL injection 0.02 mg PRN    ondansetron injection 4 mg Q6H PRN    prochlorperazine injection Soln 5 mg Q6H PRN    psyllium husk (aspartame) 3.4 gram packet 1 packet Daily    sevelamer carbonate pwpk 1.6 g TID    sodium chloride 0.9% bolus 250 mL 250 mL PRN    sodium chloride 0.9% flush 10 mL PRN       Objective:     Vital Signs (Most Recent):  Temp: 98.4 °F (36.9 °C) (02/29/24 1122)  Pulse: 87 (02/29/24 1243)  Resp: 20 (02/29/24 1243)  BP: (!) 141/76 (02/29/24 1122)  SpO2: 97 % (02/29/24 1243) Vital Signs (24h Range):  Temp:  [98.4 °F (36.9 °C)-99.7 °F (37.6 °C)] 98.4 °F (36.9 °C)  Pulse:  [84-93] 87  Resp:  [20-39] 20  SpO2:  [95 %-100 %] 97 %  BP: (126-167)/() 141/76     Weight: (!) 140.2 kg (309 lb) (02/29/24 0300)  Body mass index is 51.42 kg/m².  Body surface area is 2.54 meters squared.    I/O last 3 completed shifts:  In: 1268.3 [I.V.:8.3; NG/GT:1260]  Out: 1575 [Urine:1575]     Physical Exam  Vitals and nursing  note reviewed.   Constitutional:       General: She is not in acute distress.     Appearance: She is ill-appearing.   HENT:      Head: Normocephalic and atraumatic.   Eyes:      Comments: Not opening eyes to command    Neck:      Comments: Left IJ Trialysis catheter  Cardiovascular:      Rate and Rhythm: Normal rate and regular rhythm.   Pulmonary:      Effort: Pulmonary effort is normal.      Comments: trached    Abdominal:      General: There is no distension.      Palpations: Abdomen is soft.      Tenderness: There is no abdominal tenderness.      Comments: PEG in place   Genitourinary:     Comments: Glynn in place    Skin:     General: Skin is warm and dry.      Comments: Wound vac in place to R thigh/groin to suction  Re approximated skin clean dry and intact   Neurological:      Comments: Occasionally withdraws from painful stimuli. No significant purposeful activity           Significant Labs:  All labs within the past 24 hours have been reviewed.     Significant Imaging:  Labs: Reviewed

## 2024-02-29 NOTE — PROGRESS NOTES
"[facility]  Endocrinology  Progress Note    Admit Date: 1/29/2024     Reason for Consult: Management of T2DM, Hyperglycemia     Surgical Procedure and Date: n/a    Diabetes diagnosis year: new onset     Home Diabetes Medications:  none per chart review and family present at bedside     Lab Results   Component Value Date    HGBA1C 10.4 (H) 01/30/2024       Diabetes Complications include:     Hyperglycemia, DKA at OSH     Complicating diabetes co morbidities:   Obesity       HPI:   Patient is a 53 y.o. female with a diagnosis of obesity (BMI 53) admitted with N/V and R groin wound found to be in DKA at OSH. 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 While at OSH pt developed acute respiratory failure requiring intubation. Pulmonology was consulted for ventilator management and critical illness. Nephrology was consulted for worsening vishal in the setting of lactic acidosis and septic shock likely ATN, sCr elevated at 3.8 on admit now 4.0 post HD. Pt being admitted to SICU for surgical critical care management. Immediate plans include hemodynamic stabilization, weaning of respiratory support, and RRT.  Endocrinology consulted for management of T2DM.                 Interval HPI:   Overnight events: Stepped down from SICU to [facility]. BG stable and within goal ranges on curretn SQ insulin regimen. Creatinine 3.8. Diet NPO      Eating:   NPO  Nausea: No  Hypoglycemia and intervention: No  Fever: No  TPN and/or TF: Yes  If yes, type of TF/TPN and rate: Peptamen Intense at 50 ml/hr     BP (!) 141/76 (BP Location: Right arm, Patient Position: Lying)   Pulse 87   Temp 98.4 °F (36.9 °C) (Axillary)   Resp 20   Ht 5' 5" (1.651 m)   Wt (!) 140.2 kg (309 lb)   LMP 01/01/2024 (Approximate) Comment: tubal ligation  SpO2 97%   BMI 51.42 kg/m²     Labs Reviewed and Include    Recent Labs   Lab 02/29/24  0236   *   CALCIUM " "9.5   ALBUMIN 2.4*   PROT 8.0   *   K 4.8   CO2 22*      BUN 75*   CREATININE 3.8*   ALKPHOS 152*   ALT 38   AST 48*   BILITOT 0.2     Lab Results   Component Value Date    WBC 11.55 02/29/2024    HGB 8.2 (L) 02/29/2024    HCT 26.0 (L) 02/29/2024    MCV 91 02/29/2024     (H) 02/29/2024     No results for input(s): "TSH", "FREET4" in the last 168 hours.  Lab Results   Component Value Date    HGBA1C 10.4 (H) 01/30/2024       Nutritional status:   Body mass index is 51.42 kg/m².  Lab Results   Component Value Date    ALBUMIN 2.4 (L) 02/29/2024    ALBUMIN 2.3 (L) 02/28/2024    ALBUMIN 2.2 (L) 02/27/2024     No results found for: "PREALBUMIN"    Estimated Creatinine Clearance: 24.4 mL/min (A) (based on SCr of 3.8 mg/dL (H)).    Accu-Checks  Recent Labs     02/27/24  2320 02/28/24  0304 02/28/24  0921 02/28/24  1238 02/28/24  1604 02/28/24  1931 02/28/24  2311 02/29/24  0316 02/29/24  0717 02/29/24  1119   POCTGLUCOSE 147* 177* 183* 159* 178* 167* 155* 137* 155* 165*       Current Medications and/or Treatments Impacting Glycemic Control  Immunotherapy:    Immunosuppressants       None          Steroids:   Hormones (From admission, onward)      None          Pressors:    Autonomic Drugs (From admission, onward)      None          Hyperglycemia/Diabetes Medications:   Antihyperglycemics (From admission, onward)      Start     Stop Route Frequency Ordered    02/27/24 1354  insulin aspart U-100 pen 0-10 Units         -- SubQ Every 4 hours PRN 02/27/24 1255    02/13/24 0915  insulin detemir U-100 (Levemir) pen 14 Units         -- SubQ Daily 02/13/24 0906    02/09/24 1200  insulin aspart U-100 pen 6 Units         -- SubQ Every 4 hours 02/09/24 0901            ASSESSMENT and PLAN    Renal/  * Ayaz's gangrene  Managed per primary team  Optimize BG control        ALVARADO (acute kidney injury)  Lab Results   Component Value Date    CREATININE 3.8 (H) 02/29/2024     Avoid insulin stacking  Titrate insulin slowly "       Endocrine  Morbid (severe) obesity due to excess calories  Body mass index is 51.42 kg/m².  May increase insulin resistance.         New onset type 2 diabetes mellitus  BG goal 140-180  No previous hx of T2DM per family at bedside. A1c of 10.4. DKA at OSH. On TF. BG stable on regimen.        Plan:  - Continue Levemir 14 units daily.  - Continue Novolog 6 units q 4 hrs while on tube feeding (HOLD if tube feeding is stopped or BG < 100)   - Continue Moderate Dose Correction Scale  - BG monitoring q 4 hrs while NPO /TF    ** Please call Endocrine for any BG related issues **      Discharge plans: TBD    Lab Results   Component Value Date    HGBA1C 10.4 (H) 01/30/2024             Zoie Muñiz, NP  Endocrinology  [facility]

## 2024-02-29 NOTE — PLAN OF CARE
02/29/24 1316   Discharge Reassessment   Assessment Type Discharge Planning Reassessment   Did the patient's condition or plan change since previous assessment? No   Discharge Plan discussed with: Sibling   Communicated ZEEK with patient/caregiver Yes   Discharge Plan A Long-term acute care facility (LTAC)   DME Needed Upon Discharge  none   Transition of Care Barriers None   Why the patient remains in the hospital Requires continued medical care   Post-Acute Status   Post-Acute Authorization Placement   Post-Acute Placement Status Pending payor review/awaiting authorization (if required)   Hospital Resources/Appts/Education Provided Provided patient/caregiver with written discharge plan information   Patient choice form signed by patient/caregiver List with quality metrics by geographic area provided   Discharge Delays None known at this time         Pt not ready for discharge due to: Pending auth  SW will remain available for families in [facility].  Currently pt has d/c plans in progress at this time.    Pt to d.c to [facility] when auth received.   Discharge Plan A and Plan B have been determined by review of patient's clinical status, future medical and therapeutic needs, and coverage/benefits for post-acute care in coordination with multidisciplinary team members.     Tomasa Amezcua LCSW  Case Management/[facility]  [phone]

## 2024-02-29 NOTE — ASSESSMENT & PLAN NOTE
Lab Results   Component Value Date    CREATININE 3.8 (H) 02/29/2024     Avoid insulin stacking  Titrate insulin slowly

## 2024-02-29 NOTE — PROCEDURES
VIR procedure note      Pre Op Diagnosis: ESRD  Post Op Diagnosis: Same    Procedure: Tunneled HD cath placement    Procedure performed by:  Abdiaziz Santillan MD / Paramjit Roberts MD    Written Informed Consent Obtained: Yes  Specimen Removed: No  Estimated Blood Loss: Minimal    Findings:     Successful placement of dual lumen 14.5 Fr tunneled HD catheter with the tip in the right atrium.    Patient tolerated procedure well.     Recommendations:    Catheter can be used immediately.    Abdiaziz Santillan MD  VIR Fellow

## 2024-02-29 NOTE — PLAN OF CARE
02/29/24 0907   Post-Acute Status   Post-Acute Authorization Placement  ([facility])   Post-Acute Placement Status Pending payor review/awaiting authorization (if required)     REINALDO is submitting for auth on 2-29-24.       Jahaira Mckeon LMSW  Case Management [facility]

## 2024-02-29 NOTE — SUBJECTIVE & OBJECTIVE
"Interval HPI:   Overnight events: Stepped down from SICU to GISSU. BG stable and within goal ranges on curretn SQ insulin regimen. Creatinine 3.8. Diet NPO      Eating:   NPO  Nausea: No  Hypoglycemia and intervention: No  Fever: No  TPN and/or TF: Yes  If yes, type of TF/TPN and rate: Peptamen Intense at 50 ml/hr     BP (!) 141/76 (BP Location: Right arm, Patient Position: Lying)   Pulse 87   Temp 98.4 °F (36.9 °C) (Axillary)   Resp 20   Ht 5' 5" (1.651 m)   Wt (!) 140.2 kg (309 lb)   LMP 01/01/2024 (Approximate) Comment: tubal ligation  SpO2 97%   BMI 51.42 kg/m²     Labs Reviewed and Include    Recent Labs   Lab 02/29/24  0236   *   CALCIUM 9.5   ALBUMIN 2.4*   PROT 8.0   *   K 4.8   CO2 22*      BUN 75*   CREATININE 3.8*   ALKPHOS 152*   ALT 38   AST 48*   BILITOT 0.2     Lab Results   Component Value Date    WBC 11.55 02/29/2024    HGB 8.2 (L) 02/29/2024    HCT 26.0 (L) 02/29/2024    MCV 91 02/29/2024     (H) 02/29/2024     No results for input(s): "TSH", "FREET4" in the last 168 hours.  Lab Results   Component Value Date    HGBA1C 10.4 (H) 01/30/2024       Nutritional status:   Body mass index is 51.42 kg/m².  Lab Results   Component Value Date    ALBUMIN 2.4 (L) 02/29/2024    ALBUMIN 2.3 (L) 02/28/2024    ALBUMIN 2.2 (L) 02/27/2024     No results found for: "PREALBUMIN"    Estimated Creatinine Clearance: 24.4 mL/min (A) (based on SCr of 3.8 mg/dL (H)).    Accu-Checks  Recent Labs     02/27/24  2320 02/28/24  0304 02/28/24  0921 02/28/24  1238 02/28/24  1604 02/28/24  1931 02/28/24  2311 02/29/24  0316 02/29/24  0717 02/29/24  1119   POCTGLUCOSE 147* 177* 183* 159* 178* 167* 155* 137* 155* 165*       Current Medications and/or Treatments Impacting Glycemic Control  Immunotherapy:    Immunosuppressants       None          Steroids:   Hormones (From admission, onward)      None          Pressors:    Autonomic Drugs (From admission, onward)      None      "     Hyperglycemia/Diabetes Medications:   Antihyperglycemics (From admission, onward)      Start     Stop Route Frequency Ordered    02/27/24 1354  insulin aspart U-100 pen 0-10 Units         -- SubQ Every 4 hours PRN 02/27/24 1255    02/13/24 0915  insulin detemir U-100 (Levemir) pen 14 Units         -- SubQ Daily 02/13/24 0906    02/09/24 1200  insulin aspart U-100 pen 6 Units         -- SubQ Every 4 hours 02/09/24 0901

## 2024-02-29 NOTE — PLAN OF CARE
[facility] - [facility] Intensive Care  Discharge Reassessment    Primary Care Provider: [facility]    Expected Discharge Date: 3/4/2024    Reassessment (most recent)       Discharge Reassessment - 02/29/24 0919          Discharge Reassessment    Assessment Type Discharge Planning Reassessment     Discharge Plan discussed with: Adult children;Spouse/sig other     Communicated ZEKE with patient/caregiver Date not available/Unable to determine     Discharge Plan A Long-term acute care facility (LTAC)     Discharge Plan B Long-term acute care facility (LTAC)     DME Needed Upon Discharge  none     Transition of Care Barriers None     Why the patient remains in the hospital Requires continued medical care        Post-Acute Status    Post-Acute Authorization Placement     Post-Acute Placement Status Pending payor review/awaiting authorization (if required)                   Per MD's Note,  Interval History/Significant Events: Plan for IR for permacath placement and wound vac change. Otherwise, NAEON.     Discharge Plan A and Plan B have been determined by review of patient's clinical status, future medical and therapeutic needs, and coverage/benefits for post-acute care in coordination with multidisciplinary team members.      The patient is pending auth for LTACH placement with PHI Mckeon LMSW  Case Management [facility]

## 2024-02-29 NOTE — NURSING TRANSFER
Nursing Transfer Note      2/29/2024   4:45 PM    Reason patient is being transferred: Recovery criteria met    PACU nurse giving handoff: ELISABETH Avery    Nurse receiving handoff: ELISABETH Rosenberg    Transfer to 1046    Transfer via stretcher    Transfer with cardiac monitoring; O2 trach collar    Transported by Patient Escort/Transport    Telemetry: Box # [id number] HR 92 Rhythm NSR  Verified on & working by PACU RN: Yes    Transfer Vital Signs @ 1640  Temperature: 98.2  Blood Pressure:166/98  Heart Rate: 92 NSR  O2 Sat: 98%  Respirations:28    Medicines/Equipment sent with patient: Furosemide infusion; Wound vac; Obturator and trach supplies; Versette in place with extension tubing and new canister    Any special needs or follow-up needed: Reattach versette to suction    Patient belongings transferred with patient: No    Chart send with patient: Yes    Notified: None Listed     Patient reassessed prior to transfer at: 2/29/24 @ 1640

## 2024-02-29 NOTE — NURSING
Pt report called to receiving RN; Pt plan of care discussed; Pt VSS; Pt family at bedside and transferring all pt belongings    RN at bedside at time of arrival

## 2024-02-29 NOTE — PROGRESS NOTES
[facility]  Wound Care    Patient Name:  Sarah Saravia   MRN:  [id number]  Date: 2/29/2024  Diagnosis: Ayaz's gangrene    History:     History reviewed. No pertinent past medical history.    Social History     Socioeconomic History    Marital status: Single     Social Determinants of Health     Financial Resource Strain: Patient Unable To Answer (1/31/2024)    Overall Financial Resource Strain (CARDIA)     Difficulty of Paying Living Expenses: Patient unable to answer   Food Insecurity: Patient Declined (1/31/2024)    Hunger Vital Sign     Worried About Running Out of Food in the Last Year: Patient declined     Ran Out of Food in the Last Year: Patient declined   Transportation Needs: Patient Unable To Answer (1/31/2024)    PRAPARE - Transportation     Lack of Transportation (Medical): Patient unable to answer     Lack of Transportation (Non-Medical): Patient unable to answer   Stress: Patient Unable To Answer (1/31/2024)    [employer] - Occupational Stress Questionnaire     Feeling of Stress : Patient unable to answer   Housing Stability: Patient Unable To Answer (1/31/2024)    Housing Stability Vital Sign     Unable to Pay for Housing in the Last Year: Patient unable to answer     Unstable Housing in the Last Year: Patient unable to answer       Precautions:     Allergies as of 01/29/2024    (No Known Allergies)       [facility] Assessment Details/Treatment   Patient seen for wound care consultation.   Reviewed chart for this encounter.   See Flow Sheet for findings.    Wound care consult received from MD for wound vac dressing changes. Wound vac dressing changed as directed. Cleansed wound bed with vashe wound cleanser and applied skin prep to periwound. Applied 1 piece of black foam to wound bed and obtained a seal at 125mmHg.Patient tolerated wound vac dressing change well. Assessment and picture listed below.      RECOMMENDATIONS:  - Wound care to complete wound vac dressing  changes on Mondays/Thursdays  - Turning every 2 hours  - Heel protecting boots  - Bariatric immerse mattress   - Bedside nursing to maintain pressure injury prevention interventions        02/29/24 1044        Incision/Site 01/29/24 2300 Right Groin   Date First Assessed/Time First Assessed: 01/29/24 2300   Side: Right  Location: Groin  Additional Comments: right groin area left open with betadine soaked Kerlix packing/4x4's and ABD's   Wound Image     Incision WDL ex   Dressing Appearance Intact;Moist drainage   Drainage Amount Small   Drainage Characteristics/Odor Serosanguineous   Appearance Granulating;Moist;[place];Red   Periwound Area Intact;Dry   Wound Edges Open   Wound Length (cm) 0.6 cm   Wound Width (cm) 11.5 cm   Wound Depth (cm) 2.8 cm   Wound Volume (cm^3) 19.32 cm^3   Wound Surface Area (cm^2) 6.9 cm^2   Care Cleansed with:;Wound cleanser  (vashe)   Dressing Changed   Periwound Care Skin barrier film applied   Dressing Change Due 03/04/24        Negative Pressure Wound Therapy  02/22/24 1418 Right upper   Placement Date/Time: 02/22/24 1418   Side: Right  Orientation: upper  Location: Groin   NPWT Type Vacuum Therapy   Therapy Setting NPWT Continuous therapy   Pressure Setting NPWT 125 mmHg   Therapy Interventions NPWT Dressing changed   Sponges Inserted NPWT Black;1   Sponges Removed NPWT Black;1           02/29/2024

## 2024-02-29 NOTE — PLAN OF CARE
SICU PLAN OF CARE    Dx: Ayaz's gangrene    Goals of Care: MAP >65, SBP <160    Vital Signs (last 12 hours):   Temp:  [98.4 °F (36.9 °C)-98.6 °F (37 °C)]   Pulse:  [84-93]   Resp:  [22-39]   BP: (126-167)/()   SpO2:  [95 %-99 %]      Neuro: Withdraws to noxious stimuli     Cardiac: NSR    Respiratory: Tracheostomy Collar; 5L, 28% FiO2    Gtts: Lasix    Urine Output: External Catheter  600 mL/shift    Drains: Wound Vac, total output 0mL/shift    Diet: NPO      Labs/Accuchecks: all labs trended and reviewed, accuchecks Q4    Skin:  All skin remains free from new injury, foams and heel boots in place,  patient turned q2h, mattress inflated, and bed working correctly.    Shift Events:  Plan of care ongoing, VSS, no s/s of distress, bed in lowest position, side rails x3.  See flowsheet for further assessment/details.  Family updated on current condition/plan of care, questions answered, and emotional support provided.  MD updated on current condition, vitals, labs, and gtts.

## 2024-02-29 NOTE — ANESTHESIA PREPROCEDURE EVALUATION
02/29/2024  Sarah Saravia is a 53 y.o., female.    Procedure: IR TUNNELED CATHETER INSERTION W/O PORT Diagnosis:       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]       Tachycardia [R00.0]       ALVARADO (acute kidney injury) [N17.9]       Diabetic ketoacidosis without coma associated with other specified diabetes mellitus [E13.10]       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]       Tachycardia [R00.0]       ALVARADO (acute kidney injury) [N17.9]       Diabetic ketoacidosis without coma associated with other specified diabetes mellitus     Pre-operative evaluation for * No procedures listed *        Encounter Diagnoses   Name Primary?    Tachycardia     Diabetic ketoacidosis without coma associated with other specified diabetes mellitus Yes    Ayaz's gangrene     ALVARADO (acute kidney injury)     New onset type 2 diabetes mellitus     Morbid (severe) obesity due to excess calories     Encephalopathy, metabolic [G93.41]     Metabolic acidosis [E87.20]     On mechanically assisted ventilation [Z99.11]     Acute blood loss anemia [D62]     Acute hypoxemic respiratory failure [J96.01]     Encephalopathy     Leukocytosis, unspecified type     Acute renal failure with tubular necrosis     Cerebral infarct     Cerebral infarction, unspecified mechanism     Stroke     Endocarditis, unspecified chronicity, unspecified endocarditis type     ST elevation     Fever, unspecified fever cause     Prolonged QT interval     Acute hypoxemic respiratory failure        Review of patient's allergies indicates:  No Known Allergies    No medications prior to admission.         furosemide (Lasix) 500 mg in 50 mL infusion (conc: 10 mg/mL) 5 mg/hr (02/29/24 0800)       No current facility-administered medications on file prior to encounter.     No current outpatient medications on file prior to encounter.       No past medical history on file.    Past Surgical History:    Procedure Laterality Date    CLOSURE OF WOUND Right 2/22/2024    Procedure: CLOSURE, WOUND;  Surgeon: Steve Cole MD;  Location: [facility] OR 2ND FLR;  Service: General;  Laterality: Right;    ESOPHAGOGASTRODUODENOSCOPY W/ PEG N/A 2/22/2024    Procedure: EGD, WITH PEG TUBE INSERTION;  Surgeon: Steve Cole MD;  Location: [facility] OR 2ND FLR;  Service: General;  Laterality: N/A;    INCISION AND DRAINAGE OF PERIRECTAL REGION N/A 1/29/2024    Procedure: INCISION AND DRAINAGE, PERIRECTAL REGION;  Surgeon: Axel Ramsay MD;  Location: [facility] OR;  Service: General;  Laterality: N/A;    LUMBAR PUNCTURE N/A 2/16/2024    Procedure: Lumbar Puncture;  Surgeon: Macy Boykin;  Location: [facility];  Service: Anesthesiology;  Laterality: N/A;    PLACEMENT, TRIALYSIS CATH Right 1/31/2024    Procedure: INSERTION, CATHETER, TRIPLE LUMEN, HEMODIALYSIS, TEMPORARY;  Surgeon: Alvin Junior MD;  Location: [facility] OR;  Service: General;  Laterality: Right;    REPLACEMENT OF WOUND VACUUM-ASSISTED CLOSURE DEVICE Right 2/12/2024    Procedure: REPLACEMENT, WOUND VAC;  Surgeon: Mundo Carmona MD;  Location: [facility] OR 2ND FLR;  Service: General;  Laterality: Right;    REPLACEMENT OF WOUND VACUUM-ASSISTED CLOSURE DEVICE N/A 2/15/2024    Procedure: REPLACEMENT, WOUND VAC;  Surgeon: Steve Cole MD;  Location: [facility] OR 2ND FLR;  Service: General;  Laterality: N/A;    REPLACEMENT OF WOUND VACUUM-ASSISTED CLOSURE DEVICE Right 2/19/2024    Procedure: REPLACEMENT, WOUND VAC;  Surgeon: Steve Cole MD;  Location: [facility] OR 2ND FLR;  Service: General;  Laterality: Right;  RLE/groin    REPLACEMENT OF WOUND VACUUM-ASSISTED CLOSURE DEVICE Right 2/22/2024    Procedure: REPLACEMENT, WOUND VAC;  Surgeon: Steve Cole MD;  Location: [facility] OR 2ND FLR;  Service: General;  Laterality: Right;    TRACHEOSTOMY N/A 2/22/2024    Procedure: CREATION, TRACHEOSTOMY;  Surgeon: Steve Cole MD;  Location: [facility] OR 2ND FLR;  Service: General;  Laterality: N/A;     "WOUND DEBRIDEMENT Bilateral 2/2/2024    Procedure: DEBRIDEMENT, WOUND;  Surgeon: Steve Cole MD;  Location: [facility] OR [address] [place] [address];  Service: General;  Laterality: Bilateral;  Bilateral groin  Possible wound vac placement    WOUND DEBRIDEMENT Right 2/6/2024    Procedure: DEBRIDEMENT, WOUND, replace wound vac, possible closure;  Surgeon: Steve Cole MD;  [facility];  Service: General;  Laterality: Right;  RLE    WOUND DEBRIDEMENT Right 2/9/2024    Procedure: DEBRIDEMENT, WOUND w wound vac change;  Surgeon: Steve Cole MD;  Location: [facility] OR [address] [place] [address];  Service: General;  Laterality: Right;  RLE    WOUND DEBRIDEMENT Right 2/12/2024    Procedure: R thigh wound debridement;  Surgeon: Mundo Carmona MD;  Location: [facility] OR [address] [place] [address];  Service: General;  Laterality: Right;    WOUND EXPLORATION Right 1/31/2024    Procedure: IRRIGATION & DEBRIDEMENT, WOUND DEBRIDEMENT;  Surgeon: Alvin Junior MD;  Location: [facility];  Service: General;  Laterality: Right;       Social History     Tobacco Use   Smoking Status Not on file   Smokeless Tobacco Not on file       Social History     Substance and Sexual Activity   Alcohol Use None       Physical Activity: Not on file         Recent Labs     02/29/24  0236   HCT 26.0*     Recent Labs     02/29/24  0236   *     Recent Labs     02/29/24  0236   K 4.8     Recent Labs     02/29/24  0236   CREATININE 3.8*     Recent Labs     02/29/24  0236   *     No results for input(s): "PT" in the last 72 hours.                    Pre-op Assessment          Review of Systems  Anesthesia Hx:  No problems with previous Anesthesia                Hematology/Oncology:  Hematology Normal   Oncology Normal                                   Cardiovascular:     Hypertension, well controlled   Denies MI.        Denies Angina.                                  Pulmonary:  Pulmonary Normal   Denies COPD.  Denies Asthma.   Denies Shortness of breath.                "   Renal/:  Chronic Renal Disease        Kidney Function/Disease             Hepatic/GI:      Denies Liver Disease.            Neurological:  Denies TIA. CVA    Denies Seizures.                   CVA - Cerebrovasular Accident                 Endocrine:  Diabetes, type 2, using insulin    Diabetes                          Physical Exam    Airway:  Pre-Existing Airway: Tracheal Stoma        Anesthesia Plan  Type of Anesthesia, risks & benefits discussed:    Anesthesia Type: Gen Natural Airway  Intra-op Monitoring Plan: Standard ASA Monitors  Post Op Pain Control Plan: IV/PO Opioids PRN  Induction:  IV  Informed Consent: Informed consent signed with the Patient and all parties understand the risks and agree with anesthesia plan.  All questions answered.   ASA Score: 3  Day of Surgery Review of History & Physical: H&P Update referred to the surgeon/provider.    Ready For Surgery From Anesthesia Perspective.     .

## 2024-02-29 NOTE — PLAN OF CARE
procedure completed. Patient tolerated well., no distress noted, will continue to monitor.  Tunnelled HD catheter in right IJ  procedure site clean, dry, and intact; no bleeding or hematoma noted. Patient to be transferred to PACU for post-procedural recovery per MD. Report to be given at bedside to RN.  See anesthesia record for documentation.

## 2024-02-29 NOTE — ASSESSMENT & PLAN NOTE
Neuro/Psych:   Acute Encephalopathy  CTH 2/3 with scattered hypoattenuation likely representing age indeterminate infarcts. EEG findings consistent with moderate-severe encephalopathy. MRI 2/6: Several scattered punctate acute infarcts throughout the bilateral frontal lobes, centrum semiovale, basal ganglia, corpus callosum, and cerebellar hemispheres.  CTA 2/6: Atherosclerotic plaquing of the carotid bifurcations and proximal ICAs with less than 50% proximal ICA stenosis by NASCET criteria . Repeat CTH and MRI/MRA unchanged from prior exams. S/p multiple wound vac exchanges. Palliative onboard with family wanting full measures.  - Repeat CTH and MRI/MRA stable from initial reads. LP 2/16. Elevated WBCs and protein consistent with bacterial meningitis.  - CSF culture 2/19 with no growth  - Neuro and vascular neurology previously following and signed off  -- Sedation: None  -- Pain: kermit tylenol, dialudid and oxy prn  -- GCS 6T: E2, V1T, M3;              Cards:   -- HDS. 110-140s systolics 2/28 not requiring prns  -- Goal SBP < 180, MAP >65  -- Hydralazine and labetalol prns  -- Continue amlodipine 10mg daily; coreg 25mg BID      Pulm:   Acute Respiratory Failure  -- s/p tracheostomy on 2/22. Tolerating trach collar  -- Goal O2 sat > 90%      Renal:  ALVARADO on CKD, ATN 2/2 septic shock   -- L IJ trialysis placed 2/13 for CRRT  -- Plan for tunneled line today  -- Nephrology following; appreciate recs  -- On sevelamer  -- Replace lytes as needed.  -- Transitioned to lasix gtt    Recent Labs   Lab 02/27/24  0457 02/28/24  0309 02/29/24  0236   BUN 42* 58* 75*   CREATININE 3.2* 4.1* 3.8*        FEN / GI:   -- Daily CMPs  -- NGT in place   -- Replace lytes as needed  -- Nutrition: NPO, TF (Peptamen Intense) at goal  -- Nutrition following; appreciate recs  -- Continue psyllium and sevelamer      ID:   Ayaz's gangrene  -- multiple wound vac changes, next planned w/ wound care today  -- Completed abx course  -- ID signed  off 2/21     Heme/Onc:  -- CBC daily  -- Heparin DVT ppx  -- Transfuse if Hgb <7     Endo:   IDDM  Initially presented with DKA    - q4h BG monitoring while NPO  - Detemir 14 units daily  - Novolog 6units q4h while on TFs  - Moderate dose SSI PRN      PPx:   Feeding: TF at goal  Analgesia/Sedation: See above  Thromboembolic prevention: SQH   HOB >30: Y  Stress Ulcer ppx: not indicated  Glucose control: Goal 140-180 g/dl, kermit detemir and novolog, SSI, Endo following  Bowel reg: psyllium  Invasive Lines/Drains/Airway: YASMINE howell Trialysis, PIV x2      Dispo/Code Status/Palliative:   -- SICU / Full Code   -- Planning transition to LTAC; appreciate social work help  -- Stable for SD

## 2024-02-29 NOTE — ASSESSMENT & PLAN NOTE
Transfer from [facility]. Admitted for fourniers gangrene. Initiated HD on 1/31. ALVARADO 2/2 ATN in setting of septic shock. Arrived with CR 3.8. Unknown baseline. S/P OR debridement with possible closure and wound vac placement     ALVARADO most likley ATN in setting of septic shock     Plan/Recommendation  - No urgent need for RRT, will evaluate juan on daily basis.   - Continue Lasix gtt @ 5mg/hr.   - Please dose all ABX in accordance to RRT schedule (zosyn, etc)  -Daily RFP   -Strict I&Os  -Avoid nephrotoxins, if possible

## 2024-02-29 NOTE — PROGRESS NOTES
[facility] - [facility]  Critical Care - Surgery  Progress Note    Patient Name: Sarah Saravia  MRN: [id number]  Admission Date: 1/29/2024  Hospital Length of Stay: 31 days  Code Status: Full Code  Attending Provider: Steve Cole MD  [facility]   Principal Problem: Ayaz's gangrene    Subjective:   Interval History/Significant Events: Plan for IR for permacath placement and wound vac change. Otherwise, NAEON.    Follow-up For: Procedure(s) (LRB):  CREATION, TRACHEOSTOMY (N/A)  EGD, WITH PEG TUBE INSERTION (N/A)  CLOSURE, WOUND (Right)  REPLACEMENT, WOUND VAC (Right)    Post-Operative Day: 7 Days Post-Op    Objective:     Vital Signs (Most Recent):  Temp: 98.4 °F (36.9 °C) (02/29/24 0305)  Pulse: 89 (02/29/24 0600)  Resp: (!) 23 (02/29/24 0600)  BP: (!) 147/79 (02/29/24 0600)  SpO2: 98 % (02/29/24 0600) Vital Signs (24h Range):  Temp:  [98.4 °F (36.9 °C)-99.1 °F (37.3 °C)] 98.4 °F (36.9 °C)  Pulse:  [84-94] 89  Resp:  [22-39] 23  SpO2:  [95 %-100 %] 98 %  BP: (126-167)/() 147/79     Weight: (!) 140.2 kg (309 lb)  Body mass index is 51.42 kg/m².      Intake/Output Summary (Last 24 hours) at 2/29/2024 0650  Last data filed at 2/29/2024 0600  Gross per 24 hour   Intake 668.28 ml   Output 975 ml   Net -306.72 ml          Physical Exam  Vitals and nursing note reviewed.   Constitutional:       General: She is not in acute distress.     Appearance: She is ill-appearing.   HENT:      Head: Normocephalic and atraumatic.   Eyes:      Comments: Not opening eyes to command    Neck:      Comments: Left IJ Trialysis catheter  Cardiovascular:      Rate and Rhythm: Normal rate and regular rhythm.   Pulmonary:      Effort: Pulmonary effort is normal.      Comments: Trach  Abdominal:      General: There is no distension.      Palpations: Abdomen is soft.      Tenderness: There is no abdominal tenderness.      Comments: PEG in place   Genitourinary:      Comments: Glynn in place   Skin:     General: Skin is warm and dry.      Comments: Wound vac in place to R thigh/groin to suction  Re approximated skin clean dry and intact   Neurological:      Comments: Occasional withdrawal from painful stimuli. No significant purposeful activity         Vents:  N/A    Lines/Drains/Airways       Central Venous Catheter Line  Duration             Trialysis (Dialysis) Catheter 02/11/24 2303 left internal jugular 17 days              Drain  Duration                  Gastrostomy/Enterostomy 02/22/24 1200 LUQ 6 days         Open Drain 02/22/24 1414 Tube - 1 Right Thigh Penrose Other (see comments) 6 days    Female External Urinary Catheter w/ Suction 02/27/24 2100 1 day              Airway  Duration             Adult Surgical Airway 02/22/24 Shiley Cuffed 8.0 / 85 mm 7 days              Peripheral Intravenous Line  Duration                  Peripheral IV - Single Lumen 02/25/24 1601 18 G Left Antecubital 3 days                    Significant Labs:    CBC/Anemia Profile:  Recent Labs   Lab 02/28/24  0309 02/29/24  0236   WBC 11.94 11.55   HGB 8.0* 8.2*   HCT 25.8* 26.0*   * 582*   MCV 90 91   RDW 17.2* 17.2*        Chemistries:  Recent Labs   Lab 02/28/24  0309 02/29/24  0236    135*   K 4.6 4.8    101   CO2 21* 22*   BUN 58* 75*   CREATININE 4.1* 3.8*   CALCIUM 9.5 9.5   ALBUMIN 2.3* 2.4*   PROT 7.7 8.0   BILITOT 0.2 0.2   ALKPHOS 156* 152*   ALT 41 38   AST 56* 48*   MG 2.1 2.1   PHOS 4.7* 5.7*       All pertinent labs within the past 24 hours have been reviewed.    Significant Imaging:  I have reviewed all pertinent imaging results/findings within the past 24 hours.  I have reviewed and interpreted all pertinent imaging results/findings within the past 24 hours.  Assessment/Plan:     Renal/  * Ayaz's gangrene    Neuro/Psych:   Acute Encephalopathy  CTH 2/3 with scattered hypoattenuation likely representing age indeterminate infarcts. EEG findings consistent  with moderate-severe encephalopathy. MRI 2/6: Several scattered punctate acute infarcts throughout the bilateral frontal lobes, centrum semiovale, basal ganglia, corpus callosum, and cerebellar hemispheres.  CTA 2/6: Atherosclerotic plaquing of the carotid bifurcations and proximal ICAs with less than 50% proximal ICA stenosis by NASCET criteria . Repeat CTH and MRI/MRA unchanged from prior exams. S/p multiple wound vac exchanges. Palliative onboard with family wanting full measures.  - Repeat CTH and MRI/MRA stable from initial reads. LP 2/16. Elevated WBCs and protein consistent with bacterial meningitis.  - CSF culture 2/19 with no growth  - Neuro and vascular neurology previously following and signed off  -- Sedation: None  -- Pain: kermit tylenol, dialudid and oxy prn  -- GCS 6T: E2, V1T, M3;              Cards:   -- HDS. 110-140s systolics 2/28 not requiring prns  -- Goal SBP < 180, MAP >65  -- Hydralazine and labetalol prns  -- Continue amlodipine 10mg daily; coreg 25mg BID      Pulm:   Acute Respiratory Failure  -- s/p tracheostomy on 2/22. Tolerating trach collar  -- Goal O2 sat > 90%      Renal:  ALVARADO on CKD, ATN 2/2 septic shock   -- L IJ trialysis placed 2/13 for CRRT  -- Plan for tunneled line today  -- Nephrology following; appreciate recs  -- On sevelamer  -- Replace lytes as needed.  -- Transitioned to lasix gtt    Recent Labs   Lab 02/27/24  0457 02/28/24  0309 02/29/24  0236   BUN 42* 58* 75*   CREATININE 3.2* 4.1* 3.8*        FEN / GI:   -- Daily CMPs  -- NGT in place   -- Replace lytes as needed  -- Nutrition: NPO, TF (Peptamen Intense) at goal  -- Nutrition following; appreciate recs  -- Continue psyllium and sevelamer      ID:   Ayaz's gangrene  -- multiple wound vac changes, next planned w/ wound care today  -- Completed abx course  -- ID signed off 2/21     Heme/Onc:  -- CBC daily  -- Heparin DVT ppx  -- Transfuse if Hgb <7     Endo:   IDDM  Initially presented with DKA    - q4h BG monitoring  while NPO  - Detemir 14 units daily  - Novolog 6units q4h while on TFs  - Moderate dose SSI PRN      PPx:   Feeding: TF at goal  Analgesia/Sedation: See above  Thromboembolic prevention: SQH   HOB >30: Y  Stress Ulcer ppx: not indicated  Glucose control: Goal 140-180 g/dl, kermit detemir and novolog, SSI, Endo following  Bowel reg: psyllium  Invasive Lines/Drains/Airway: trach, LIJ Trialysis, PIV x2      Dispo/Code Status/Palliative:   -- SICU / Full Code   -- Planning transition to LTAC; appreciate social work help  -- Stable for SD         David Stokes MD  Critical Care - Surgery  [facility]

## 2024-02-29 NOTE — PROGRESS NOTES
[facility]  General Surgery  Progress Note    Subjective:     History of Present Illness:  53 year old morbidly obese female who does not routinely go to the doctor presents to the ED with malaise, nausea, vomiting, and groin, buttock, perineal swelling and tenderness. She began feeling ill a few days ago.     On arrival to the ED she is tachycardic to 120, hypertensive without fever. Labs demonstrate leukocytosis of 21, , with lactic acidosis and associated ALVARADO with cr 3.8, hyperglycemia with blood glucose of 824 accompanied by severe electrolyte derangements. CT imaging obtained demonstrates diffuse subcutaneous air along buttocks, perineum, anterior vulva, as well as bilateral thighs.     No prior abdominal surgeries. She denies problems with her heart or lungs. Non smoker. Does not routinely take any medications.    Post-Op Info:  Procedure(s) (LRB):  CREATION, TRACHEOSTOMY (N/A)  EGD, WITH PEG TUBE INSERTION (N/A)  CLOSURE, WOUND (Right)  REPLACEMENT, WOUND VAC (Right)   7 Days Post-Op     Interval History: NAEO. Afebrile. HDS. On trach collar. TF held for IR permacath today. Wound vac to be changed today, will get pictures and measurements     Medications:  Continuous Infusions:   furosemide (Lasix) 500 mg in 50 mL infusion (conc: 10 mg/mL) 5 mg/hr (02/29/24 0701)     Scheduled Meds:   sodium chloride 0.9%   Intravenous Once    amLODIPine  10 mg Per G Tube Daily    carvediloL  25 mg Per G Tube BID    heparin (porcine)  7,500 Units Subcutaneous Q8H    insulin aspart U-100  6 Units Subcutaneous Q4H    insulin detemir U-100  14 Units Subcutaneous Daily    psyllium husk (aspartame)  1 packet Per G Tube Daily    sevelamer carbonate  1.6 g Per G Tube TID     PRN Meds:sodium chloride 0.9%, acetaminophen, albuterol-ipratropium, dextrose 10%, dextrose 10%, glucagon (human recombinant), glucose, glucose, hydrALAZINE, insulin aspart U-100, labetalol, naloxone, ondansetron, prochlorperazine,  sodium chloride 0.9%, sodium chloride 0.9%     Review of patient's allergies indicates:  No Known Allergies  Objective:     Vital Signs (Most Recent):  Temp: 99.7 °F (37.6 °C) (02/29/24 0700)  Pulse: 89 (02/29/24 0735)  Resp: (!) 21 (02/29/24 0700)  BP: (!) 145/74 (02/29/24 0700)  SpO2: 97 % (02/29/24 0700) Vital Signs (24h Range):  Temp:  [98.4 °F (36.9 °C)-99.7 °F (37.6 °C)] 99.7 °F (37.6 °C)  Pulse:  [84-94] 89  Resp:  [21-39] 21  SpO2:  [95 %-100 %] 97 %  BP: (126-167)/() 145/74     Weight: (!) 140.2 kg (309 lb)  Body mass index is 51.42 kg/m².    Intake/Output - Last 3 Shifts         02/27 0700 02/28 0659 02/28 0700 02/29 0659 02/29 0700  03/01 0659    I.V. (mL/kg)  8.3 (0.1) 0.5 (0)    NG/GT 1450 660     Total Intake(mL/kg) 1450 (10.5) 668.3 (4.8) 0.5 (0)    Urine (mL/kg/hr) 600 (0.2) 975 (0.3) 100 (0.8)    Other       Stool 0 0     Total Output 600 975 100    Net +850 -306.7 -99.5           Urine Occurrence 6 x 1 x     Stool Occurrence 2 x 1 x             Physical Exam  Vitals and nursing note reviewed.   Constitutional:       General: She is not in acute distress.     Appearance: She is ill-appearing.   HENT:      Head: Normocephalic and atraumatic.   Eyes:      Comments: Not opening eyes to command    Neck:      Comments: Left IJ Trialysis catheter  Cardiovascular:      Rate and Rhythm: Normal rate and regular rhythm.   Pulmonary:      Effort: Pulmonary effort is normal.      Comments: trached    Abdominal:      General: There is no distension.      Palpations: Abdomen is soft.      Tenderness: There is no abdominal tenderness.      Comments: PEG in place   Genitourinary:     Comments: Glynn in place    Skin:     General: Skin is warm and dry.      Comments: Wound vac in place to R thigh/groin to suction  Re approximated skin clean dry and intact   Neurological:      Comments: Occasionally withdraws from painful stimuli. No significant purposeful activity           Significant Labs:  I have reviewed  all pertinent lab results within the past 24 hours.  CBC:   Recent Labs   Lab 02/29/24  0236   WBC 11.55   RBC 2.87*   HGB 8.2*   HCT 26.0*   *   MCV 91   MCH 28.6   MCHC 31.5*       BMP:   Recent Labs   Lab 02/29/24  0236   *   *   K 4.8      CO2 22*   BUN 75*   CREATININE 3.8*   CALCIUM 9.5   MG 2.1         Significant Diagnostics:  I have reviewed all pertinent imaging results/findings within the past 24 hours.  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 Unfortunately, has multiple infarcts on MRI brain with unchanged neuro status, however, family would like to proceed with all measures. S/p trach, peg, and lower wound closure, replacement of wound vac in right groin on 2/22/24.     - Wound vac changed 2/26. Wound care to change next wound vac, plan for today. Will get pictures and measurements   - HD held today per nephro   - IR to place line today, TF held  - LP 2/16 - NGTD  - ID consulted for urine cultures -  zosyn    - UA growing Burkholderia species and Chryseibacterium Indologenes, recommend IV zosyn with stop date of 2/22  - Palliative following, full code  - Stable on trach collar  - Okay for DVT ppx   - Remainder of care per SICU    Dispo: LTAC placement once line in place      Case discussed with Dr. Cole.    Braxton Calhoun PA-C  General Surgery  [facility]

## 2024-02-29 NOTE — PLAN OF CARE
CM met with the team to discuss discharge to LTAC patient is scheduled for perma cath placement today (02/29/24) and must have 1 dialysis session prior to transfer dialysis is scheduled for 03/01/24 wound vac change is scheduled for 03/01/24 and at that time the picture requested by LTAC will be charted   LTAC liaison advised to file for authorization   Reference number [id number]   CHRISTINA YOUNG

## 2024-02-29 NOTE — PLAN OF CARE
9:02 AM   The SW contacted Kathe to follow-up regarding this patient's d/c plans for [facility]. Per Kathe, the patient would need her line and have at least Dialysis session through the line before admission into [facility].  [facility] will be submitting for auth on behalf of this patient.  The SW met with the patient and her family at bedside and provided a update regarding the patient's pending auth and placement with [facility]. Also, [facility] is pending wound care pictures.     The SW will continue to follow.       Jahaira Mckeon LMSW  Case Management [facility]

## 2024-02-29 NOTE — PLAN OF CARE
Patient AAOx3, no distress noted, trach color in place. VS stable will continue to monitor., consents signed, H/P done. Labs reviewed. Patient in IR Room 189 for HD catheter procedure. Warm blankets applied to patient. Patient prepped and draped in sterile fashion. Anesthesia at bedside; Refer to anesthesia record regarding sedation and vital signs.

## 2024-02-29 NOTE — SUBJECTIVE & OBJECTIVE
Interval History/Significant Events: Plan for IR for permacath placement and wound vac change. Otherwise, NAEON.    Follow-up For: Procedure(s) (LRB):  CREATION, TRACHEOSTOMY (N/A)  EGD, WITH PEG TUBE INSERTION (N/A)  CLOSURE, WOUND (Right)  REPLACEMENT, WOUND VAC (Right)    Post-Operative Day: 7 Days Post-Op    Objective:     Vital Signs (Most Recent):  Temp: 98.4 °F (36.9 °C) (02/29/24 0305)  Pulse: 89 (02/29/24 0600)  Resp: (!) 23 (02/29/24 0600)  BP: (!) 147/79 (02/29/24 0600)  SpO2: 98 % (02/29/24 0600) Vital Signs (24h Range):  Temp:  [98.4 °F (36.9 °C)-99.1 °F (37.3 °C)] 98.4 °F (36.9 °C)  Pulse:  [84-94] 89  Resp:  [22-39] 23  SpO2:  [95 %-100 %] 98 %  BP: (126-167)/() 147/79     Weight: (!) 140.2 kg (309 lb)  Body mass index is 51.42 kg/m².      Intake/Output Summary (Last 24 hours) at 2/29/2024 0650  Last data filed at 2/29/2024 0600  Gross per 24 hour   Intake 668.28 ml   Output 975 ml   Net -306.72 ml          Physical Exam  Vitals and nursing note reviewed.   Constitutional:       General: She is not in acute distress.     Appearance: She is ill-appearing.   HENT:      Head: Normocephalic and atraumatic.   Eyes:      Comments: Not opening eyes to command    Neck:      Comments: Left IJ Trialysis catheter  Cardiovascular:      Rate and Rhythm: Normal rate and regular rhythm.   Pulmonary:      Effort: Pulmonary effort is normal.      Comments: Trach  Abdominal:      General: There is no distension.      Palpations: Abdomen is soft.      Tenderness: There is no abdominal tenderness.      Comments: PEG in place   Genitourinary:     Comments: Glynn in place   Skin:     General: Skin is warm and dry.      Comments: Wound vac in place to R thigh/groin to suction  Re approximated skin clean dry and intact   Neurological:      Comments: Occasional withdrawal from painful stimuli. No significant purposeful activity         Vents:  N/A    Lines/Drains/Airways       Central Venous Catheter Line  Duration              Trialysis (Dialysis) Catheter 02/11/24 2303 left internal jugular 17 days              Drain  Duration                  Gastrostomy/Enterostomy 02/22/24 1200 LUQ 6 days         Open Drain 02/22/24 1414 Tube - 1 Right Thigh Penrose Other (see comments) 6 days    Female External Urinary Catheter w/ Suction 02/27/24 2100 1 day              Airway  Duration             Adult Surgical Airway 02/22/24 Shiley Cuffed 8.0 / 85 mm 7 days              Peripheral Intravenous Line  Duration                  Peripheral IV - Single Lumen 02/25/24 1601 18 G Left Antecubital 3 days                    Significant Labs:    CBC/Anemia Profile:  Recent Labs   Lab 02/28/24  0309 02/29/24  0236   WBC 11.94 11.55   HGB 8.0* 8.2*   HCT 25.8* 26.0*   * 582*   MCV 90 91   RDW 17.2* 17.2*        Chemistries:  Recent Labs   Lab 02/28/24  0309 02/29/24  0236    135*   K 4.6 4.8    101   CO2 21* 22*   BUN 58* 75*   CREATININE 4.1* 3.8*   CALCIUM 9.5 9.5   ALBUMIN 2.3* 2.4*   PROT 7.7 8.0   BILITOT 0.2 0.2   ALKPHOS 156* 152*   ALT 41 38   AST 56* 48*   MG 2.1 2.1   PHOS 4.7* 5.7*       All pertinent labs within the past 24 hours have been reviewed.    Significant Imaging:  I have reviewed all pertinent imaging results/findings within the past 24 hours.  I have reviewed and interpreted all pertinent imaging results/findings within the past 24 hours.

## 2024-02-29 NOTE — PROGRESS NOTES
[facility]  Nephrology  Progress Note    Patient Name: Sarah Saravia  MRN: [id number]  Admission Date: 1/29/2024  Hospital Length of Stay: 31 days  Attending Provider: Steve Cole MD   Primary Care Physician: [facility]  Principal Problem:Ayaz's gangrene    Subjective:     HPI: Sarah Saravia is a 53 year old female with a past medical history of obesity (BMI 53) admitted with N/V and R groin wound found to be in DKA at OSH.  She underwent a CT abdomen and pelvis that showed extensive soft tissue air throughout the right groin and inguinal region and extending to involve the right lower quadrant anterior abdominal wall with extensive soft tissue air also seen involving the proximal medial aspect of the right thigh, concerning for gas-forming infection. She is s/p OR debridement for necrotizing fascitis on 1/29/24 and take back for 1/31/24. Right groin tissue growing proteus, susceptibilities still pending. Currently on meropenem and clindamycin which was d/c'd on 1/31/24. While at OSH pt developed acute respiratory failure requiring intubation. Nephrology was consulted for worsening alvarado in the setting of lactic acidosis and septic shock likely ATN, sCr elevated at 3.8 on admit.  OR today for debridement with possible closure and wound vac placement. Last HD 2/1. Net -1.3L. Electrolytes stable. Nephrology consulted for ALVARADO.     Interval History:     No acute events overnight. Mentation unchanged.     Review of patient's allergies indicates:  No Known Allergies  Current Facility-Administered Medications   Medication Frequency    0.9%  NaCl infusion PRN    0.9%  NaCl infusion Once    acetaminophen tablet 650 mg Q4H PRN    albuterol-ipratropium 2.5 mg-0.5 mg/3 mL nebulizer solution 3 mL Q4H PRN    amLODIPine tablet 10 mg Daily    carvediloL tablet 25 mg BID    dextrose 10% bolus 125 mL 125 mL PRN    dextrose 10% bolus 250 mL 250 mL PRN    furosemide (Lasix) 500 mg in 50 mL  infusion (conc: 10 mg/mL) Continuous    glucagon (human recombinant) injection 1 mg PRN    glucose chewable tablet 16 g PRN    glucose chewable tablet 24 g PRN    heparin (porcine) injection 7,500 Units Q8H    hydrALAZINE tablet 25 mg Q8H PRN    insulin aspart U-100 pen 0-10 Units Q4H PRN    insulin aspart U-100 pen 6 Units Q4H    insulin detemir U-100 (Levemir) pen 14 Units Daily    labetalol 20 mg/4 mL (5 mg/mL) IV syring Q6H PRN    naloxone 0.4 mg/mL injection 0.02 mg PRN    ondansetron injection 4 mg Q6H PRN    prochlorperazine injection Soln 5 mg Q6H PRN    psyllium husk (aspartame) 3.4 gram packet 1 packet Daily    sevelamer carbonate pwpk 1.6 g TID    sodium chloride 0.9% bolus 250 mL 250 mL PRN    sodium chloride 0.9% flush 10 mL PRN       Objective:     Vital Signs (Most Recent):  Temp: 98.4 °F (36.9 °C) (02/29/24 1122)  Pulse: 87 (02/29/24 1243)  Resp: 20 (02/29/24 1243)  BP: (!) 141/76 (02/29/24 1122)  SpO2: 97 % (02/29/24 1243) Vital Signs (24h Range):  Temp:  [98.4 °F (36.9 °C)-99.7 °F (37.6 °C)] 98.4 °F (36.9 °C)  Pulse:  [84-93] 87  Resp:  [20-39] 20  SpO2:  [95 %-100 %] 97 %  BP: (126-167)/() 141/76     Weight: (!) 140.2 kg (309 lb) (02/29/24 0300)  Body mass index is 51.42 kg/m².  Body surface area is 2.54 meters squared.    I/O last 3 completed shifts:  In: 1268.3 [I.V.:8.3; NG/GT:1260]  Out: 1575 [Urine:1575]     Physical Exam  Vitals and nursing note reviewed.   Constitutional:       General: She is not in acute distress.     Appearance: She is ill-appearing.   HENT:      Head: Normocephalic and atraumatic.   Eyes:      Comments: Not opening eyes to command    Neck:      Comments: Left IJ Trialysis catheter  Cardiovascular:      Rate and Rhythm: Normal rate and regular rhythm.   Pulmonary:      Effort: Pulmonary effort is normal.      Comments: trached    Abdominal:      General: There is no distension.      Palpations: Abdomen is soft.      Tenderness: There is no abdominal tenderness.       Comments: PEG in place   Genitourinary:     Comments: Glynn in place    Skin:     General: Skin is warm and dry.      Comments: Wound vac in place to R thigh/groin to suction  Re approximated skin clean dry and intact   Neurological:      Comments: Occasionally withdraws from painful stimuli. No significant purposeful activity           Significant Labs:  All labs within the past 24 hours have been reviewed.     Significant Imaging:  Labs: Reviewed  Assessment/Plan:     Neuro  Cerebral infarction  Per primary team.     Renal/  * Ayaz's gangrene  - management per primary     ALVARADO (acute kidney injury)  Transfer from [facility]. Admitted for fourniers gangrene. Initiated HD on 1/31. ALVARADO 2/2 ATN in setting of septic shock. Arrived with CR 3.8. Unknown baseline. S/P OR debridement with possible closure and wound vac placement     ALVARADO most likley ATN in setting of septic shock     Plan/Recommendation  - No urgent need for RRT, will evaluate juan on daily basis.   - Continue Lasix gtt @ 5mg/hr.   - Please dose all ABX in accordance to RRT schedule (zosyn, etc)  -Daily RFP   -Strict I&Os  -Avoid nephrotoxins, if possible         Thank you for your consult. I will follow-up with patient. Please contact us if you have any additional questions.    Case discussed with attending. Attestation to follow.       Antione Hazel,   Nephrology  [facility]

## 2024-02-29 NOTE — ASSESSMENT & PLAN NOTE
Managed per primary team  Optimize BG control       Physical Discharge Summary Addendum:  Date: 9/3/2019  Total Number of Visits: 13  Referred by: Justus Michel MD  Medical Diagnosis (from order):   Diagnosis Information      Diagnosis    729.5 (ICD-9-CM) - M79.672 (ICD-10-CM) - Left foot pain    733.94 (ICD-9-CM) - M84.375A (ICD-10-CM) - Stress fracture of metatarsal bone of left foot, initial encounter        Please see below daily treatment note for last known status. Patient had MD appt 4/29/19, which she cancelled and did not reschedule with MD or return to PT.  Status of goals: per status in last daily treatment note    Physical Therapy Daily Note and MD Progress Note    Visit Count: 13    Plan of Care: 2/19/2019 Through: 4/17/2019  Insurance Information: [employer]   Authorization Needed: Yes after 40 visits through [employer]   Maximum Visit Limit Per Year: Medical Necessity   CoPay: $0.00  Call Ref #: Renard [id number]  Referred by: Justus Michel MD; Next provider visit (if known/scheduled): 3/14/19  Medical Diagnosis (from order):  Vitamin D deficiency [E55.9]  Closed nondisplaced fracture of proximal phalanx of lesser toe of left foot with routine healing, subsequent encounter [S92.515D]  Stress fracture of metatarsal bone of left foot with routine healing, subsequent encounter [M84.375D]  Left foot stress fracture, equinus contracture, left ankle stiffness and weakness with gait abnormality  Treatment Diagnosis: Left foot symptoms with impaired strength, impaired range of motion, impaired muscle length/flexibility, impaired joint mobility/play, impaired gait, impaired mobility, impaired activity tolerance  Signs and symptoms consistent with Left foot and ankle pain      Date of onset/injury: Left 5th toe fracture in October 2018 resulted in wearing a boot for 5-6 months  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  Chart reviewed at time of initial evaluation (relevant co-morbidities, allergies, tests and medications listed):   Depression  No past medical history on file.    SUBJECTIVE   Went 2 days without brace, 13 hour work shift today. Able to tolerate being on feet quite a bit more vs last week. Taping seemed to help.    Sees MD 4/29/19    Current Pain (0-10 scale):  1/10 best, 6 /10 worst  Functional Change: NA    OBJECTIVE   Range of Motion (degrees)    *Left* Right Left Left Left Left   Date Initial Initial 3/11/19 4/1/19 4/17/19 4/25/19   Knee Flexion (135) 130 130       Knee Extension (0-5) 0 0       Ankle Dorsiflexion (20) +2 10 2 4 10 10   Ankle Plantar Flexion (50) 40 50 45 50  60   Ankle Inversion (35) 20 WNL 30   40   Ankle Eversion (15) 10 WNL 15   15   Reported in degrees, active range of motion recorded unless noted as   AA=active assistive or P=passive;   standard testing positions unless otherwise noted, norms included in ( );   *=pain     Only those motions that were assessed are noted.    Left Right Left   Range of Motion (degrees)  Initial Initial 4/25/19   First MTP Dorsiflexion (60 ) 40 40 48   First MTP Plantar Flexion (45) 20 20 40   Only deficits assessed reported above; active range of motion recorded unless noted as AA=active assistive or P=passive; standard testing positions unless otherwise noted, norms included in ( ); MTP=metatarsophalangeal, *=pain    Strength (out of 5)    *Left* Right Left   Date Initial Initial 4/25/19   Ankle Dorsiflexors 3 4+ 5   Ankle Plantar Flexors 3 4+ 4+   Ankle Invertors 3 4 4+   Ankle Evertors 3 4 4+   standard testing positions unless otherwise noted; *=pain  Only muscle strength that was assessed are noted.    Treatment   Manual Therapy: supine with wedge  Gr III L ankle distraction, AP mobs to  talocrural, and subtalar eversion mobs, forefoot mobs  Lateral Josselyn's mobs followed by manual gastroc stretch  Light x-friction to medial arch, distal peroneal, and toe extensor tendons followed by manual stretches  PROM all planes including toes  Kinesiotape, \"I\" inhibition to L ant tib/ext digitorum longus with 4 strip fan to extensor tendons of dorsum of foot. Education and instruction in tape precautions and wear time.    Therapeutic Exercise:    Ankle 4 way AROM x 10  Toe flex/ext AROM x 10  Towel gastroc stretch progressed to wall stretch  Seated heel raises progressed to stand with progressive WS to L    Professional skill was required to determine the effectiveness of past procedures and appropriateness of today's procedures, along with providing understandable education for impairment management by providing verbal, tactile, and visual cues for effective performance of all of the above procedure details essential for facilitating healing and avoiding delays in recovery.    Home Program:   Seated Heel Raises and Toe raises 20 repetitions each 2 times per day, progressed to stand with progressive WS to L  Toe curls 10 repetitions 2 times per day*   Towel gastroc stretch, 3 x 30 secs, 2x/day, progressed to wall stretch  DF with green Tband, Inversion and eversion     Writer verbally educated the patient and received verbal consent from the patient on hand placement, positioning of patient, and techniques to be performed today including MT and how they are pertinent to the patient's plan of care.      Suggestions for next session as indicated:   PT will proceed per MD plan of care    ASSESSMENT   Pain levels have improved the past week, activity related mostly due to standing with long work shifts. Making good progress with ROM and strength. Has met all PT goals except for pain levels increasing with activity.    Pain after treatment (patient reported, 0-10 scale): 6/10 at end of work shift.   Result of  above outlined education: Verbalizes understanding and Demonstrates understanding    PLAN   Goals: To be obtained by end of this plan of care: 4/3/19  1. Patient will be independent with progressed and modified home exercise program Met   2. Decrease pain/symptoms to 1/10 - Progressing  3. Improve involved strength to 4+/5 - Met  4. Improve involved range of motion to Dorsiflexion to 10 - Met  through improvements listed above patient will:  5. Be able to ambulate community distances on even and uneven terrain without assistive device - Met  6. Be able to ascend and descend 1 flight of stairs using reciprocal pattern without pain/difficulty - Met  7. Be able to lift 10-15 lbs without pain/difficulty to improve grocery shopping - Met     The following skilled interventions to be implemented to achieve above:  Dry Needling - Unlisted physical medicine/rehabilitation service or procedure (76959)  Gait Training (79996)  Manual Therapy (58680)  Neuromuscular Re-Education (79079)  Therapeutic Activity (47000)  Therapeutic Exercise (15549)  Electrical Stimulation (27568//97010)   Heat/Cold (69956)  Iontophoresis (94761)  dexamethasone sodium phosphate, 4mg/ml up to 6 applications  Ultrasound/Phonophoresis (41090)  Orthotics Training (32789)  Strapping  Splinting/Orthotics   Unlisted therapy service or procedure (47358)    THERAPY DAILY BILLING   Insurance: [email or web address] 2. N/A    Evaluation Procedures:  No evaluation codes were used on this date of service    Timed Procedures:  Manual Therapy, 25 minutes  Therapeutic Exercise, 15 minutes    Untimed Procedures:  No untimed codes were used on this date of service    Total Treatment Time: 40 minutes

## 2024-02-29 NOTE — SUBJECTIVE & OBJECTIVE
Interval History: NAEO. Afebrile. HDS. On trach collar. TF held for IR permacath today. Wound vac to be changed today, will get pictures and measurements     Medications:  Continuous Infusions:   furosemide (Lasix) 500 mg in 50 mL infusion (conc: 10 mg/mL) 5 mg/hr (02/29/24 0701)     Scheduled Meds:   sodium chloride 0.9%   Intravenous Once    amLODIPine  10 mg Per G Tube Daily    carvediloL  25 mg Per G Tube BID    heparin (porcine)  7,500 Units Subcutaneous Q8H    insulin aspart U-100  6 Units Subcutaneous Q4H    insulin detemir U-100  14 Units Subcutaneous Daily    psyllium husk (aspartame)  1 packet Per G Tube Daily    sevelamer carbonate  1.6 g Per G Tube TID     PRN Meds:sodium chloride 0.9%, acetaminophen, albuterol-ipratropium, dextrose 10%, dextrose 10%, glucagon (human recombinant), glucose, glucose, hydrALAZINE, insulin aspart U-100, labetalol, naloxone, ondansetron, prochlorperazine, sodium chloride 0.9%, sodium chloride 0.9%     Review of patient's allergies indicates:  No Known Allergies  Objective:     Vital Signs (Most Recent):  Temp: 99.7 °F (37.6 °C) (02/29/24 0700)  Pulse: 89 (02/29/24 0735)  Resp: (!) 21 (02/29/24 0700)  BP: (!) 145/74 (02/29/24 0700)  SpO2: 97 % (02/29/24 0700) Vital Signs (24h Range):  Temp:  [98.4 °F (36.9 °C)-99.7 °F (37.6 °C)] 99.7 °F (37.6 °C)  Pulse:  [84-94] 89  Resp:  [21-39] 21  SpO2:  [95 %-100 %] 97 %  BP: (126-167)/() 145/74     Weight: (!) 140.2 kg (309 lb)  Body mass index is 51.42 kg/m².    Intake/Output - Last 3 Shifts         02/27 0700  02/28 0659 02/28 0700  02/29 0659 02/29 0700  03/01 0659    I.V. (mL/kg)  8.3 (0.1) 0.5 (0)    NG/GT 1450 660     Total Intake(mL/kg) 1450 (10.5) 668.3 (4.8) 0.5 (0)    Urine (mL/kg/hr) 600 (0.2) 975 (0.3) 100 (0.8)    Other       Stool 0 0     Total Output 600 975 100    Net +850 -306.7 -99.5           Urine Occurrence 6 x 1 x     Stool Occurrence 2 x 1 x              Physical Exam  Vitals and nursing note reviewed.    Constitutional:       General: She is not in acute distress.     Appearance: She is ill-appearing.   HENT:      Head: Normocephalic and atraumatic.   Eyes:      Comments: Not opening eyes to command    Neck:      Comments: Left IJ Trialysis catheter  Cardiovascular:      Rate and Rhythm: Normal rate and regular rhythm.   Pulmonary:      Effort: Pulmonary effort is normal.      Comments: trached    Abdominal:      General: There is no distension.      Palpations: Abdomen is soft.      Tenderness: There is no abdominal tenderness.      Comments: PEG in place   Genitourinary:     Comments: Glynn in place    Skin:     General: Skin is warm and dry.      Comments: Wound vac in place to R thigh/groin to suction  Re approximated skin clean dry and intact   Neurological:      Comments: Occasionally withdraws from painful stimuli. No significant purposeful activity           Significant Labs:  I have reviewed all pertinent lab results within the past 24 hours.  CBC:   Recent Labs   Lab 02/29/24  0236   WBC 11.55   RBC 2.87*   HGB 8.2*   HCT 26.0*   *   MCV 91   MCH 28.6   MCHC 31.5*       BMP:   Recent Labs   Lab 02/29/24  0236   *   *   K 4.8      CO2 22*   BUN 75*   CREATININE 3.8*   CALCIUM 9.5   MG 2.1         Significant Diagnostics:  I have reviewed all pertinent imaging results/findings within the past 24 hours.

## 2024-02-29 NOTE — ASSESSMENT & PLAN NOTE
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 Unfortunately, has multiple infarcts on MRI brain with unchanged neuro status, however, family would like to proceed with all measures. S/p trach, peg, and lower wound closure, replacement of wound vac in right groin on 2/22/24.     - Wound vac changed 2/26. Wound care to change next wound vac, plan for today. Will get pictures and measurements   - HD held today per nephro   - IR to place line today, TF held  - LP 2/16 - NGTD  - ID consulted for urine cultures -  zosyn    - UA growing Burkholderia species and Chryseibacterium Indologenes, recommend IV zosyn with stop date of 2/22  - Palliative following, full code  - Stable on trach collar  - Okay for DVT ppx   - Remainder of care per SICU    Dispo: LTAC placement once line in place

## 2024-03-01 LAB
ALBUMIN SERPL BCP-MCNC: 2.6 G/DL (ref 3.5–5.2)
ALP SERPL-CCNC: 180 U/L (ref 55–135)
ALT SERPL W/O P-5'-P-CCNC: 36 U/L (ref 10–44)
ANION GAP SERPL CALC-SCNC: 15 MMOL/L (ref 8–16)
AST SERPL-CCNC: 48 U/L (ref 10–40)
BASOPHILS # BLD AUTO: 0.03 K/UL (ref 0–0.2)
BASOPHILS # BLD AUTO: 0.06 K/UL (ref 0–0.2)
BASOPHILS NFR BLD: 0.3 % (ref 0–1.9)
BASOPHILS NFR BLD: 0.6 % (ref 0–1.9)
BILIRUB SERPL-MCNC: 0.2 MG/DL (ref 0.1–1)
BUN SERPL-MCNC: 93 MG/DL (ref 6–20)
CALCIUM SERPL-MCNC: 9.5 MG/DL (ref 8.7–10.5)
CHLORIDE SERPL-SCNC: 99 MMOL/L (ref 95–110)
CO2 SERPL-SCNC: 21 MMOL/L (ref 23–29)
CREAT SERPL-MCNC: 3.7 MG/DL (ref 0.5–1.4)
DIFFERENTIAL METHOD BLD: ABNORMAL
DIFFERENTIAL METHOD BLD: ABNORMAL
EOSINOPHIL # BLD AUTO: 0.5 K/UL (ref 0–0.5)
EOSINOPHIL # BLD AUTO: 0.5 K/UL (ref 0–0.5)
EOSINOPHIL NFR BLD: 4.4 % (ref 0–8)
EOSINOPHIL NFR BLD: 4.4 % (ref 0–8)
ERYTHROCYTE [DISTWIDTH] IN BLOOD BY AUTOMATED COUNT: 16.9 % (ref 11.5–14.5)
ERYTHROCYTE [DISTWIDTH] IN BLOOD BY AUTOMATED COUNT: 17 % (ref 11.5–14.5)
EST. GFR  (NO RACE VARIABLE): 14 ML/MIN/1.73 M^2
GLUCOSE SERPL-MCNC: 174 MG/DL (ref 70–110)
HCT VFR BLD AUTO: 28.2 % (ref 37–48.5)
HCT VFR BLD AUTO: 28.6 % (ref 37–48.5)
HGB BLD-MCNC: 9 G/DL (ref 12–16)
HGB BLD-MCNC: 9 G/DL (ref 12–16)
IMM GRANULOCYTES # BLD AUTO: 0.06 K/UL (ref 0–0.04)
IMM GRANULOCYTES # BLD AUTO: 0.06 K/UL (ref 0–0.04)
IMM GRANULOCYTES NFR BLD AUTO: 0.6 % (ref 0–0.5)
IMM GRANULOCYTES NFR BLD AUTO: 0.6 % (ref 0–0.5)
LYMPHOCYTES # BLD AUTO: 1.9 K/UL (ref 1–4.8)
LYMPHOCYTES # BLD AUTO: 2.1 K/UL (ref 1–4.8)
LYMPHOCYTES NFR BLD: 18 % (ref 18–48)
LYMPHOCYTES NFR BLD: 19.8 % (ref 18–48)
MAGNESIUM SERPL-MCNC: 2.2 MG/DL (ref 1.6–2.6)
MCH RBC QN AUTO: 28 PG (ref 27–31)
MCH RBC QN AUTO: 28.4 PG (ref 27–31)
MCHC RBC AUTO-ENTMCNC: 31.5 G/DL (ref 32–36)
MCHC RBC AUTO-ENTMCNC: 31.9 G/DL (ref 32–36)
MCV RBC AUTO: 89 FL (ref 82–98)
MCV RBC AUTO: 89 FL (ref 82–98)
MONOCYTES # BLD AUTO: 1.1 K/UL (ref 0.3–1)
MONOCYTES # BLD AUTO: 1.2 K/UL (ref 0.3–1)
MONOCYTES NFR BLD: 10.4 % (ref 4–15)
MONOCYTES NFR BLD: 11.5 % (ref 4–15)
NEUTROPHILS # BLD AUTO: 6.8 K/UL (ref 1.8–7.7)
NEUTROPHILS # BLD AUTO: 6.9 K/UL (ref 1.8–7.7)
NEUTROPHILS NFR BLD: 64.2 % (ref 38–73)
NEUTROPHILS NFR BLD: 65.2 % (ref 38–73)
NRBC BLD-RTO: 0 /100 WBC
NRBC BLD-RTO: 0 /100 WBC
PHOSPHATE SERPL-MCNC: 6.7 MG/DL (ref 2.7–4.5)
PLATELET # BLD AUTO: 588 K/UL (ref 150–450)
PLATELET # BLD AUTO: 607 K/UL (ref 150–450)
PMV BLD AUTO: 9.7 FL (ref 9.2–12.9)
PMV BLD AUTO: 9.8 FL (ref 9.2–12.9)
POCT GLUCOSE: 155 MG/DL (ref 70–110)
POCT GLUCOSE: 164 MG/DL (ref 70–110)
POCT GLUCOSE: 168 MG/DL (ref 70–110)
POCT GLUCOSE: 172 MG/DL (ref 70–110)
POCT GLUCOSE: 172 MG/DL (ref 70–110)
POCT GLUCOSE: 175 MG/DL (ref 70–110)
POCT GLUCOSE: 211 MG/DL (ref 70–110)
POTASSIUM SERPL-SCNC: 5.3 MMOL/L (ref 3.5–5.1)
PROT SERPL-MCNC: 8.8 G/DL (ref 6–8.4)
RBC # BLD AUTO: 3.17 M/UL (ref 4–5.4)
RBC # BLD AUTO: 3.22 M/UL (ref 4–5.4)
SODIUM SERPL-SCNC: 135 MMOL/L (ref 136–145)
WBC # BLD AUTO: 10.52 K/UL (ref 3.9–12.7)
WBC # BLD AUTO: 10.53 K/UL (ref 3.9–12.7)

## 2024-03-01 PROCEDURE — 83735 ASSAY OF MAGNESIUM: CPT | Performed by: STUDENT IN AN ORGANIZED HEALTH CARE EDUCATION/TRAINING PROGRAM

## 2024-03-01 PROCEDURE — 63600175 PHARM REV CODE 636 W HCPCS

## 2024-03-01 PROCEDURE — 94761 N-INVAS EAR/PLS OXIMETRY MLT: CPT

## 2024-03-01 PROCEDURE — 99900026 HC AIRWAY MAINTENANCE (STAT)

## 2024-03-01 PROCEDURE — 94640 AIRWAY INHALATION TREATMENT: CPT

## 2024-03-01 PROCEDURE — 99232 SBSQ HOSP IP/OBS MODERATE 35: CPT | Mod: ,,, | Performed by: NURSE PRACTITIONER

## 2024-03-01 PROCEDURE — 25000242 PHARM REV CODE 250 ALT 637 W/ HCPCS

## 2024-03-01 PROCEDURE — 27000207 HC ISOLATION

## 2024-03-01 PROCEDURE — 20600001 HC STEP DOWN PRIVATE ROOM

## 2024-03-01 PROCEDURE — 80053 COMPREHEN METABOLIC PANEL: CPT | Performed by: STUDENT IN AN ORGANIZED HEALTH CARE EDUCATION/TRAINING PROGRAM

## 2024-03-01 PROCEDURE — 85025 COMPLETE CBC W/AUTO DIFF WBC: CPT | Mod: 91

## 2024-03-01 PROCEDURE — 99900035 HC TECH TIME PER 15 MIN (STAT)

## 2024-03-01 PROCEDURE — 63600175 PHARM REV CODE 636 W HCPCS: Performed by: STUDENT IN AN ORGANIZED HEALTH CARE EDUCATION/TRAINING PROGRAM

## 2024-03-01 PROCEDURE — 99233 SBSQ HOSP IP/OBS HIGH 50: CPT | Mod: ,,, | Performed by: INTERNAL MEDICINE

## 2024-03-01 PROCEDURE — 27200966 HC CLOSED SUCTION SYSTEM

## 2024-03-01 PROCEDURE — 27000221 HC OXYGEN, UP TO 24 HOURS

## 2024-03-01 PROCEDURE — 85025 COMPLETE CBC W/AUTO DIFF WBC: CPT | Performed by: STUDENT IN AN ORGANIZED HEALTH CARE EDUCATION/TRAINING PROGRAM

## 2024-03-01 PROCEDURE — 84100 ASSAY OF PHOSPHORUS: CPT | Performed by: STUDENT IN AN ORGANIZED HEALTH CARE EDUCATION/TRAINING PROGRAM

## 2024-03-01 PROCEDURE — 25000003 PHARM REV CODE 250

## 2024-03-01 RX ORDER — NALOXONE HCL 0.4 MG/ML
0.02 VIAL (ML) INJECTION
Status: CANCELLED | OUTPATIENT
Start: 2024-03-01

## 2024-03-01 RX ORDER — FUROSEMIDE 10 MG/ML
40 INJECTION INTRAMUSCULAR; INTRAVENOUS ONCE
Status: COMPLETED | OUTPATIENT
Start: 2024-03-01 | End: 2024-03-01

## 2024-03-01 RX ORDER — HEPARIN SODIUM 5000 [USP'U]/ML
7500 INJECTION, SOLUTION INTRAVENOUS; SUBCUTANEOUS EVERY 8 HOURS
Status: CANCELLED | OUTPATIENT
Start: 2024-03-01

## 2024-03-01 RX ORDER — PROCHLORPERAZINE EDISYLATE 5 MG/ML
5 INJECTION INTRAMUSCULAR; INTRAVENOUS EVERY 6 HOURS PRN
Status: CANCELLED | OUTPATIENT
Start: 2024-03-01

## 2024-03-01 RX ORDER — INSULIN ASPART 100 [IU]/ML
6 INJECTION, SOLUTION INTRAVENOUS; SUBCUTANEOUS EVERY 4 HOURS
Status: CANCELLED | OUTPATIENT
Start: 2024-03-01

## 2024-03-01 RX ORDER — GLUCAGON 1 MG
1 KIT INJECTION
Status: CANCELLED | OUTPATIENT
Start: 2024-03-01

## 2024-03-01 RX ORDER — IPRATROPIUM BROMIDE AND ALBUTEROL SULFATE 2.5; .5 MG/3ML; MG/3ML
3 SOLUTION RESPIRATORY (INHALATION) EVERY 4 HOURS PRN
Status: CANCELLED | OUTPATIENT
Start: 2024-03-01

## 2024-03-01 RX ORDER — CARVEDILOL 25 MG/1
25 TABLET ORAL 2 TIMES DAILY
Status: CANCELLED | OUTPATIENT
Start: 2024-03-01

## 2024-03-01 RX ORDER — IBUPROFEN 200 MG
24 TABLET ORAL
Status: CANCELLED | OUTPATIENT
Start: 2024-03-01

## 2024-03-01 RX ORDER — SODIUM CHLORIDE 9 MG/ML
INJECTION, SOLUTION INTRAVENOUS
Status: CANCELLED | OUTPATIENT
Start: 2024-03-01

## 2024-03-01 RX ORDER — LABETALOL HCL 20 MG/4 ML
10 SYRINGE (ML) INTRAVENOUS EVERY 6 HOURS PRN
Status: CANCELLED | OUTPATIENT
Start: 2024-03-01

## 2024-03-01 RX ORDER — HYDRALAZINE HYDROCHLORIDE 25 MG/1
25 TABLET, FILM COATED ORAL EVERY 8 HOURS PRN
Status: CANCELLED | OUTPATIENT
Start: 2024-03-01

## 2024-03-01 RX ORDER — SODIUM CHLORIDE 0.9 % (FLUSH) 0.9 %
10 SYRINGE (ML) INJECTION
Status: CANCELLED | OUTPATIENT
Start: 2024-03-01

## 2024-03-01 RX ORDER — ONDANSETRON HYDROCHLORIDE 2 MG/ML
4 INJECTION, SOLUTION INTRAVENOUS EVERY 6 HOURS PRN
Status: CANCELLED | OUTPATIENT
Start: 2024-03-01

## 2024-03-01 RX ORDER — ACETAMINOPHEN 325 MG/1
650 TABLET ORAL EVERY 4 HOURS PRN
Status: CANCELLED | OUTPATIENT
Start: 2024-03-01

## 2024-03-01 RX ORDER — INSULIN ASPART 100 [IU]/ML
0-10 INJECTION, SOLUTION INTRAVENOUS; SUBCUTANEOUS EVERY 4 HOURS PRN
Status: CANCELLED | OUTPATIENT
Start: 2024-03-01

## 2024-03-01 RX ORDER — IBUPROFEN 200 MG
16 TABLET ORAL
Status: CANCELLED | OUTPATIENT
Start: 2024-03-01

## 2024-03-01 RX ORDER — SEVELAMER CARBONATE FOR ORAL SUSPENSION 800 MG/1
1.6 POWDER, FOR SUSPENSION ORAL 3 TIMES DAILY
Status: CANCELLED | OUTPATIENT
Start: 2024-03-01

## 2024-03-01 RX ORDER — AMLODIPINE BESYLATE 10 MG/1
10 TABLET ORAL DAILY
Status: CANCELLED | OUTPATIENT
Start: 2024-03-02

## 2024-03-01 RX ADMIN — INSULIN ASPART 1 UNITS: 100 INJECTION, SOLUTION INTRAVENOUS; SUBCUTANEOUS at 08:03

## 2024-03-01 RX ADMIN — INSULIN ASPART 6 UNITS: 100 INJECTION, SOLUTION INTRAVENOUS; SUBCUTANEOUS at 12:03

## 2024-03-01 RX ADMIN — PSYLLIUM HUSK 1 PACKET: 3.4 POWDER ORAL at 09:03

## 2024-03-01 RX ADMIN — INSULIN ASPART 4 UNITS: 100 INJECTION, SOLUTION INTRAVENOUS; SUBCUTANEOUS at 12:03

## 2024-03-01 RX ADMIN — HEPARIN SODIUM 7500 UNITS: 5000 INJECTION INTRAVENOUS; SUBCUTANEOUS at 05:03

## 2024-03-01 RX ADMIN — INSULIN ASPART 6 UNITS: 100 INJECTION, SOLUTION INTRAVENOUS; SUBCUTANEOUS at 04:03

## 2024-03-01 RX ADMIN — IPRATROPIUM BROMIDE AND ALBUTEROL SULFATE 3 ML: .5; 3 SOLUTION RESPIRATORY (INHALATION) at 11:03

## 2024-03-01 RX ADMIN — INSULIN ASPART 6 UNITS: 100 INJECTION, SOLUTION INTRAVENOUS; SUBCUTANEOUS at 08:03

## 2024-03-01 RX ADMIN — SEVELAMER CARBONATE 1.6 G: 800 POWDER, FOR SUSPENSION ORAL at 08:03

## 2024-03-01 RX ADMIN — INSULIN ASPART 1 UNITS: 100 INJECTION, SOLUTION INTRAVENOUS; SUBCUTANEOUS at 04:03

## 2024-03-01 RX ADMIN — HYDRALAZINE HYDROCHLORIDE 25 MG: 25 TABLET, FILM COATED ORAL at 04:03

## 2024-03-01 RX ADMIN — CARVEDILOL 25 MG: 25 TABLET, FILM COATED ORAL at 08:03

## 2024-03-01 RX ADMIN — SEVELAMER CARBONATE 1.6 G: 800 POWDER, FOR SUSPENSION ORAL at 09:03

## 2024-03-01 RX ADMIN — INSULIN ASPART 2 UNITS: 100 INJECTION, SOLUTION INTRAVENOUS; SUBCUTANEOUS at 04:03

## 2024-03-01 RX ADMIN — HEPARIN SODIUM 7500 UNITS: 5000 INJECTION INTRAVENOUS; SUBCUTANEOUS at 10:03

## 2024-03-01 RX ADMIN — HEPARIN SODIUM 7500 UNITS: 5000 INJECTION INTRAVENOUS; SUBCUTANEOUS at 04:03

## 2024-03-01 RX ADMIN — AMLODIPINE BESYLATE 10 MG: 10 TABLET ORAL at 04:03

## 2024-03-01 RX ADMIN — INSULIN ASPART 1 UNITS: 100 INJECTION, SOLUTION INTRAVENOUS; SUBCUTANEOUS at 12:03

## 2024-03-01 RX ADMIN — FUROSEMIDE 40 MG: 10 INJECTION, SOLUTION INTRAVENOUS at 04:03

## 2024-03-01 RX ADMIN — INSULIN ASPART 2 UNITS: 100 INJECTION, SOLUTION INTRAVENOUS; SUBCUTANEOUS at 09:03

## 2024-03-01 RX ADMIN — INSULIN ASPART 6 UNITS: 100 INJECTION, SOLUTION INTRAVENOUS; SUBCUTANEOUS at 09:03

## 2024-03-01 RX ADMIN — INSULIN DETEMIR 14 UNITS: 100 INJECTION, SOLUTION SUBCUTANEOUS at 09:03

## 2024-03-01 RX ADMIN — SEVELAMER CARBONATE 1.6 G: 800 POWDER, FOR SUSPENSION ORAL at 04:03

## 2024-03-01 NOTE — PLAN OF CARE
..[facility] Plan of Care Note  Dx ros's gangrene    Shift Events resumed tube feeds, diarrhea x4 entering into sutures in R groin--> FMS, incont of urine, external catheter inadequate--> meza    Goals of Care: dc to LTAC    Neuro: PABLO, withdraws to pain    Vital Signs: wdl    Respiratory: 8 shiley semi cuffed. Trach collar 5L 28%    Diet: npo    Is patient tolerating current diet? yes    GTTS: lasix 5mg/hr    Urine Output/Bowel Movement: adequate- meza lbm 2/29 diarrhea    Drains/Tubes/Tube Feeds (include total output/shift): pinrose drains x2, wound vac to R groin, L abd peg---> TF peptamen intense VHP 50cc/hr, meza, fms    Lines: L trialysis cath, piv      Accuchecks:q4    Skin: R groin wound vac, R groin sutures, pinrose drains    Fall Risk Score: 11    Activity level? BB    Any scheduled procedures? none    Any safety concerns? Falls, aspiration precautions    Other: n/a

## 2024-03-01 NOTE — ASSESSMENT & PLAN NOTE
Transfer from [facility]. Admitted for fourniers gangrene. Initiated HD on 1/31. ALVARADO 2/2 ATN in setting of septic shock. Arrived with CR 3.8. Unknown baseline. S/P OR debridement with possible closure and wound vac placement     ALVARADO most likley ATN in setting of septic shock     Plan/Recommendation  - No urgent need for RRT, will evaluate need on daily basis.   - Would HOLD on perm cath placement for now as patient with some signs of renal recovery  - need RFP daily to watch for renal recovery  - Please dose all ABX in accordance to RRT schedule (zosyn, etc)  -Daily RFP   -Strict I&Os  -Avoid nephrotoxins, if possible

## 2024-03-01 NOTE — SUBJECTIVE & OBJECTIVE
Interval History: NAEO. VSS. AF. Not following commands or opening eyes this morning. Stable on trach collar. Patient underwent IR placement of HD line yesterday. TF at goal.     Medications:  Continuous Infusions:   furosemide (Lasix) 500 mg in 50 mL infusion (conc: 10 mg/mL) 5 mg/hr (02/29/24 0800)     Scheduled Meds:   sodium chloride 0.9%   Intravenous Once    amLODIPine  10 mg Per G Tube Daily    carvediloL  25 mg Per G Tube BID    heparin (porcine)  7,500 Units Subcutaneous Q8H    insulin aspart U-100  6 Units Subcutaneous Q4H    insulin detemir U-100  14 Units Subcutaneous Daily    psyllium husk (aspartame)  1 packet Per G Tube Daily    sevelamer carbonate  1.6 g Per G Tube TID     PRN Meds:sodium chloride 0.9%, acetaminophen, albuterol-ipratropium, dextrose 10%, dextrose 10%, glucagon (human recombinant), glucose, glucose, hydrALAZINE, insulin aspart U-100, labetalol, naloxone, ondansetron, prochlorperazine, sodium chloride 0.9%, sodium chloride 0.9%     Review of patient's allergies indicates:  No Known Allergies  Objective:     Vital Signs (Most Recent):  Temp: 98.1 °F (36.7 °C) (03/01/24 0729)  Pulse: 98 (03/01/24 0729)  Resp: 18 (03/01/24 0729)  BP: (!) 146/88 (03/01/24 0729)  SpO2: 100 % (03/01/24 0729) Vital Signs (24h Range):  Temp:  [97.9 °F (36.6 °C)-99.1 °F (37.3 °C)] 98.1 °F (36.7 °C)  Pulse:  [87-98] 98  Resp:  [18-28] 18  SpO2:  [94 %-100 %] 100 %  BP: (139-179)/(71-99) 146/88     Weight: (!) 140.2 kg (309 lb)  Body mass index is 51.42 kg/m².    Intake/Output - Last 3 Shifts         02/28 0700  02/29 0659 02/29 0700  03/01 0659 03/01 0700  03/02 0659    I.V. (mL/kg) 8.3 (0.1) 1 (0)     NG/ 666     IV Piggyback  250     Total Intake(mL/kg) 668.3 (4.8) 917 (6.5)     Urine (mL/kg/hr) 975 (0.3) 825 (0.2)     Drains  0     Other  25     Stool 0 5     Total Output 975 855     Net -306.7 +62            Urine Occurrence 1 x 2 x     Stool Occurrence 1 x 4 x              Physical Exam  Vitals and  nursing note reviewed.   Constitutional:       General: She is not in acute distress.     Appearance: She is ill-appearing.   HENT:      Head: Normocephalic and atraumatic.   Eyes:      Comments: Not opening eyes to command    Neck:      Comments: Left IJ Trialysis catheter  Cardiovascular:      Rate and Rhythm: Normal rate and regular rhythm.   Pulmonary:      Effort: Pulmonary effort is normal.      Comments: trached    Abdominal:      General: There is no distension.      Palpations: Abdomen is soft.      Tenderness: There is no abdominal tenderness.      Comments: PEG in place   Genitourinary:     Comments: Glynn in place    Skin:     General: Skin is warm and dry.      Comments: Wound vac in place to R thigh/groin to suction  Re approximated skin clean dry and intact   Neurological:      Comments: Occasionally withdraws from painful stimuli. No significant purposeful activity           Significant Labs:  I have reviewed all pertinent lab results within the past 24 hours.  CBC:   Recent Labs   Lab 02/29/24  0236   WBC 11.55   RBC 2.87*   HGB 8.2*   HCT 26.0*   *   MCV 91   MCH 28.6   MCHC 31.5*     CMP:   Recent Labs   Lab 02/29/24  0236   *   CALCIUM 9.5   ALBUMIN 2.4*   PROT 8.0   *   K 4.8   CO2 22*      BUN 75*   CREATININE 3.8*   ALKPHOS 152*   ALT 38   AST 48*   BILITOT 0.2       Significant Diagnostics:  I have reviewed all pertinent imaging results/findings within the past 24 hours.

## 2024-03-01 NOTE — RESPIRATORY THERAPY
"RAPID RESPONSE RESPIRATORY THERAPY PROACTIVE NOTE           Time of visit: 843     Code Status: Full Code   : 1970  Bed: 1046/1046 A:   MRN: [id number]  Time spent at the bedside: < 15 min    SITUATION    Evaluated patient for: LDA Check     BACKGROUND    Patient has no past medical history on file.  Clinically Significant Surgical Hx: tracheostomy    24 Hours Vitals Range:  Temp:  [97.9 °F (36.6 °C)-99.1 °F (37.3 °C)]   Pulse:  [91-99]   Resp:  [16-28]   BP: (139-179)/(71-99)   SpO2:  [94 %-100 %]     Labs:    Recent Labs     24  0309 24  0236 24  1435    135* 135*   K 4.6 4.8 5.3*    101 99   CO2 21* 22* 21*   BUN 58* 75* 93*   CREATININE 4.1* 3.8* 3.7*   * 141* 174*   PHOS 4.7* 5.7* 6.7*   MG 2.1 2.1 2.2        No results for input(s): "PH", "PCO2", "PO2", "HCO3", "POCSATURATED", "BE" in the last 72 hours.    ASSESSMENT/INTERVENTIONS  Pt on 5L 28% trach collar. Shiley size 8 cuffed trach in place. All supplies in room. Extra trachs at bedside - 6(2), and 8, all cuffed.       Last VS   Temp: 97.9 °F (36.6 °C) ( 111)  Pulse: 95 ( 1455)  Resp: 16 ( 111)  BP: 151/85 ( 111)  SpO2: 99 % ( 1455)      Extra trachs at bedside: 6(2), and 8, all cuffed.  Level of Consciousness: Level of Consciousness (AVPU): alert  Respiratory Effort: Respiratory Effort: Normal, Unlabored Expansion/Accessory Muscle Usage: Expansion/Accessory Muscles/Retractions: expansion symmetric, no retractions, no use of accessory muscles  All Lung Field Breath Sounds: All Lung Fields Breath Sounds: Anterior:, Lateral:, crackles, diminished  JOSE Breath Sounds: clear  LLL Breath Sounds: clear, diminished  RUL Breath Sounds: clear  RML Breath Sounds: clear, diminished  RLL Breath Sounds: clear, diminished  O2 Device/Concentration: 5L 28% TC.  Surgical airway: Yes, Type: Shiley Size: 8, cuffed  Ambu at bedside:       Active Orders   Respiratory Care    Inhalation Treatment Q4H PRN     " Frequency: Q4H PRN     Number of Occurrences: Until Specified    Oxygen Continuous     Frequency: Continuous     Number of Occurrences: Until Specified     Order Questions:      Device type: Low flow      Device: Trach Collar      FiO2%: 40      Titrate O2 per Oxygen Titration Protocol: Yes      To maintain SpO2 goal of: >= 90%      Notify MD of: Inability to achieve desired SpO2; Sudden change in patient status and requires 20% increase in FiO2; Patient requires >60% FiO2    Pulse Oximetry Continuous     Frequency: Continuous     Number of Occurrences: Until Specified    Routine tracheostomy care     Frequency: BID     Number of Occurrences: Until Specified    SUCTION Q4H     Frequency: Q4H     Number of Occurrences: Until Specified       RECOMMENDATIONS    We recommend: RRT Recs: Continue POC per primary team.      FOLLOW-UP    Please call back the Rapid Response RT, Javier June RRT at x [phone] for any questions or concerns.

## 2024-03-01 NOTE — SUBJECTIVE & OBJECTIVE
"Interval HPI:   Overnight events: Remains in GISSU. POD 8. BG reasonably controlled on current SQ insulin regimen. Creatinine 3.8. Diet NPO    Eating:   NPO  Nausea: No  Hypoglycemia and intervention: No  Fever: No  TPN and/or TF: Yes  If yes, type of TF/TPN and rate: TFs at 50 ml/hr     BP (!) 151/85   Pulse 98   Temp 97.9 °F (36.6 °C) (Oral)   Resp 16   Ht 5' 5" (1.651 m)   Wt (!) 140.2 kg (309 lb)   LMP 01/01/2024 (Approximate) Comment: tubal ligation  SpO2 98%   BMI 51.42 kg/m²     Labs Reviewed and Include    No results for input(s): "GLU", "CALCIUM", "ALBUMIN", "PROT", "NA", "K", "CO2", "CL", "BUN", "CREATININE", "ALKPHOS", "ALT", "AST", "BILITOT" in the last 24 hours.  Lab Results   Component Value Date    WBC 11.55 02/29/2024    HGB 8.2 (L) 02/29/2024    HCT 26.0 (L) 02/29/2024    MCV 91 02/29/2024     (H) 02/29/2024     No results for input(s): "TSH", "FREET4" in the last 168 hours.  Lab Results   Component Value Date    HGBA1C 10.4 (H) 01/30/2024       Nutritional status:   Body mass index is 51.42 kg/m².  Lab Results   Component Value Date    ALBUMIN 2.4 (L) 02/29/2024    ALBUMIN 2.3 (L) 02/28/2024    ALBUMIN 2.2 (L) 02/27/2024     No results found for: "PREALBUMIN"    Estimated Creatinine Clearance: 24.4 mL/min (A) (based on SCr of 3.8 mg/dL (H)).    Accu-Checks  Recent Labs     02/28/24  2311 02/29/24  0316 02/29/24  0717 02/29/24  1119 02/29/24  1742 02/29/24  2013 03/01/24  0020 03/01/24  0436 03/01/24  0726 03/01/24  1114   POCTGLUCOSE 155* 137* 155* 165* 168* 172* 175* 168* 164* 211*       Current Medications and/or Treatments Impacting Glycemic Control  Immunotherapy:    Immunosuppressants       None          Steroids:   Hormones (From admission, onward)      None          Pressors:    Autonomic Drugs (From admission, onward)      None          Hyperglycemia/Diabetes Medications:   Antihyperglycemics (From admission, onward)      Start     Stop Route Frequency Ordered    02/27/24 " 1354  insulin aspart U-100 pen 0-10 Units         -- SubQ Every 4 hours PRN 02/27/24 1255    02/13/24 0915  insulin detemir U-100 (Levemir) pen 14 Units         -- SubQ Daily 02/13/24 0906    02/09/24 1200  insulin aspart U-100 pen 6 Units         -- SubQ Every 4 hours 02/09/24 0901

## 2024-03-01 NOTE — PROGRESS NOTES
"[facility] - [facility]  Endocrinology  Progress Note    Admit Date: 1/29/2024     Reason for Consult: Management of T2DM, Hyperglycemia     Surgical Procedure and Date: n/a    Diabetes diagnosis year: new onset     Home Diabetes Medications:  none per chart review and family present at bedside     Lab Results   Component Value Date    HGBA1C 10.4 (H) 01/30/2024       Diabetes Complications include:     Hyperglycemia, DKA at OSH     Complicating diabetes co morbidities:   Obesity       HPI:   Patient is a 53 y.o. female with a diagnosis of obesity (BMI 53) admitted with N/V and R groin wound found to be in DKA at OSH. 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 While at OSH pt developed acute respiratory failure requiring intubation. Pulmonology was consulted for ventilator management and critical illness. Nephrology was consulted for worsening vishal in the setting of lactic acidosis and septic shock likely ATN, sCr elevated at 3.8 on admit now 4.0 post HD. Pt being admitted to SICU for surgical critical care management. Immediate plans include hemodynamic stabilization, weaning of respiratory support, and RRT.  Endocrinology consulted for management of T2DM.                 Interval HPI:   Overnight events: Remains in [facility]. POD 8. BG reasonably controlled on current SQ insulin regimen. Creatinine 3.8. Diet NPO    Eating:   NPO  Nausea: No  Hypoglycemia and intervention: No  Fever: No  TPN and/or TF: Yes  If yes, type of TF/TPN and rate: TFs at 50 ml/hr     BP (!) 151/85   Pulse 98   Temp 97.9 °F (36.6 °C) (Oral)   Resp 16   Ht 5' 5" (1.651 m)   Wt (!) 140.2 kg (309 lb)   LMP 01/01/2024 (Approximate) Comment: tubal ligation  SpO2 98%   BMI 51.42 kg/m²     Labs Reviewed and Include    No results for input(s): "GLU", "CALCIUM", "ALBUMIN", "PROT", "NA", "K", "CO2", "CL", "BUN", "CREATININE", "ALKPHOS", "ALT", "AST", " ""BILITOT" in the last 24 hours.  Lab Results   Component Value Date    WBC 11.55 02/29/2024    HGB 8.2 (L) 02/29/2024    HCT 26.0 (L) 02/29/2024    MCV 91 02/29/2024     (H) 02/29/2024     No results for input(s): "TSH", "FREET4" in the last 168 hours.  Lab Results   Component Value Date    HGBA1C 10.4 (H) 01/30/2024       Nutritional status:   Body mass index is 51.42 kg/m².  Lab Results   Component Value Date    ALBUMIN 2.4 (L) 02/29/2024    ALBUMIN 2.3 (L) 02/28/2024    ALBUMIN 2.2 (L) 02/27/2024     No results found for: "PREALBUMIN"    Estimated Creatinine Clearance: 24.4 mL/min (A) (based on SCr of 3.8 mg/dL (H)).    Accu-Checks  Recent Labs     02/28/24  2311 02/29/24  0316 02/29/24  0717 02/29/24  1119 02/29/24  1742 02/29/24  2013 03/01/24  0020 03/01/24  0436 03/01/24  0726 03/01/24  1114   POCTGLUCOSE 155* 137* 155* 165* 168* 172* 175* 168* 164* 211*       Current Medications and/or Treatments Impacting Glycemic Control  Immunotherapy:    Immunosuppressants       None          Steroids:   Hormones (From admission, onward)      None          Pressors:    Autonomic Drugs (From admission, onward)      None          Hyperglycemia/Diabetes Medications:   Antihyperglycemics (From admission, onward)      Start     Stop Route Frequency Ordered    02/27/24 1354  insulin aspart U-100 pen 0-10 Units         -- SubQ Every 4 hours PRN 02/27/24 1255    02/13/24 0915  insulin detemir U-100 (Levemir) pen 14 Units         -- SubQ Daily 02/13/24 0906    02/09/24 1200  insulin aspart U-100 pen 6 Units         -- SubQ Every 4 hours 02/09/24 0901            ASSESSMENT and PLAN    Renal/  * Ayaz's gangrene  Managed per primary team  Optimize BG control        ALVARADO (acute kidney injury)  Lab Results   Component Value Date    CREATININE 3.8 (H) 02/29/2024     Avoid insulin stacking  Titrate insulin slowly       Endocrine  Morbid (severe) obesity due to excess calories  Body mass index is 51.42 kg/m².  May increase " insulin resistance.         New onset type 2 diabetes mellitus  BG goal 140-180  No previous hx of T2DM per family at bedside. A1c of 10.4. DKA at OSH. On TF. BG stable on regimen.        Plan:  - Continue Levemir 14 units daily.  - Continue Novolog 6 units q 4 hrs while on tube feeding (HOLD if tube feeding is stopped or BG < 100)   - Continue Moderate Dose Correction Scale  - BG monitoring q 4 hrs while NPO /TF    ** Please call Endocrine for any BG related issues **      Discharge plans: TBD    Lab Results   Component Value Date    HGBA1C 10.4 (H) 01/30/2024             Zoie Muñiz, NP  Endocrinology  [facility]

## 2024-03-01 NOTE — PROGRESS NOTES
[facility] - [facility]  General Surgery  Progress Note    Subjective:     History of Present Illness:  53 year old morbidly obese female who does not routinely go to the doctor presents to the ED with malaise, nausea, vomiting, and groin, buttock, perineal swelling and tenderness. She began feeling ill a few days ago.     On arrival to the ED she is tachycardic to 120, hypertensive without fever. Labs demonstrate leukocytosis of 21, , with lactic acidosis and associated ALVARADO with cr 3.8, hyperglycemia with blood glucose of 824 accompanied by severe electrolyte derangements. CT imaging obtained demonstrates diffuse subcutaneous air along buttocks, perineum, anterior vulva, as well as bilateral thighs.     No prior abdominal surgeries. She denies problems with her heart or lungs. Non smoker. Does not routinely take any medications.    Post-Op Info:  Procedure(s) (LRB):  CREATION, TRACHEOSTOMY (N/A)  EGD, WITH PEG TUBE INSERTION (N/A)  CLOSURE, WOUND (Right)  REPLACEMENT, WOUND VAC (Right)   8 Days Post-Op     Interval History: NAEO. VSS. AF. Not following commands or opening eyes this morning. Stable on trach collar. Patient underwent IR placement of HD line yesterday. TF at goal.     Medications:  Continuous Infusions:   furosemide (Lasix) 500 mg in 50 mL infusion (conc: 10 mg/mL) 5 mg/hr (02/29/24 0800)     Scheduled Meds:   sodium chloride 0.9%   Intravenous Once    amLODIPine  10 mg Per G Tube Daily    carvediloL  25 mg Per G Tube BID    heparin (porcine)  7,500 Units Subcutaneous Q8H    insulin aspart U-100  6 Units Subcutaneous Q4H    insulin detemir U-100  14 Units Subcutaneous Daily    psyllium husk (aspartame)  1 packet Per G Tube Daily    sevelamer carbonate  1.6 g Per G Tube TID     PRN Meds:sodium chloride 0.9%, acetaminophen, albuterol-ipratropium, dextrose 10%, dextrose 10%, glucagon (human recombinant), glucose, glucose, hydrALAZINE, insulin aspart U-100, labetalol, naloxone, ondansetron,  prochlorperazine, sodium chloride 0.9%, sodium chloride 0.9%     Review of patient's allergies indicates:  No Known Allergies  Objective:     Vital Signs (Most Recent):  Temp: 98.1 °F (36.7 °C) (03/01/24 0729)  Pulse: 98 (03/01/24 0729)  Resp: 18 (03/01/24 0729)  BP: (!) 146/88 (03/01/24 0729)  SpO2: 100 % (03/01/24 0729) Vital Signs (24h Range):  Temp:  [97.9 °F (36.6 °C)-99.1 °F (37.3 °C)] 98.1 °F (36.7 °C)  Pulse:  [87-98] 98  Resp:  [18-28] 18  SpO2:  [94 %-100 %] 100 %  BP: (139-179)/(71-99) 146/88     Weight: (!) 140.2 kg (309 lb)  Body mass index is 51.42 kg/m².    Intake/Output - Last 3 Shifts         02/28 0700  02/29 0659 02/29 0700 03/01 0659 03/01 0700 03/02 0659    I.V. (mL/kg) 8.3 (0.1) 1 (0)     NG/ 666     IV Piggyback  250     Total Intake(mL/kg) 668.3 (4.8) 917 (6.5)     Urine (mL/kg/hr) 975 (0.3) 825 (0.2)     Drains  0     Other  25     Stool 0 5     Total Output 975 855     Net -306.7 +62            Urine Occurrence 1 x 2 x     Stool Occurrence 1 x 4 x              Physical Exam  Vitals and nursing note reviewed.   Constitutional:       General: She is not in acute distress.     Appearance: She is ill-appearing.   HENT:      Head: Normocephalic and atraumatic.   Eyes:      Comments: Not opening eyes to command    Neck:      Comments: Left IJ Trialysis catheter  Cardiovascular:      Rate and Rhythm: Normal rate and regular rhythm.   Pulmonary:      Effort: Pulmonary effort is normal.      Comments: trached    Abdominal:      General: There is no distension.      Palpations: Abdomen is soft.      Tenderness: There is no abdominal tenderness.      Comments: PEG in place   Genitourinary:     Comments: Glynn in place    Skin:     General: Skin is warm and dry.      Comments: Wound vac in place to R thigh/groin to suction  Re approximated skin clean dry and intact   Neurological:      Comments: Occasionally withdraws from painful stimuli. No significant purposeful activity            Significant Labs:  I have reviewed all pertinent lab results within the past 24 hours.  CBC:   Recent Labs   Lab 02/29/24  0236   WBC 11.55   RBC 2.87*   HGB 8.2*   HCT 26.0*   *   MCV 91   MCH 28.6   MCHC 31.5*     CMP:   Recent Labs   Lab 02/29/24  0236   *   CALCIUM 9.5   ALBUMIN 2.4*   PROT 8.0   *   K 4.8   CO2 22*      BUN 75*   CREATININE 3.8*   ALKPHOS 152*   ALT 38   AST 48*   BILITOT 0.2       Significant Diagnostics:  I have reviewed all pertinent imaging results/findings within the past 24 hours.  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 Unfortunately, has multiple infarcts on MRI brain with unchanged neuro status, however, family would like to proceed with all measures. S/p trach, peg, and lower wound closure, replacement of wound vac in right groin on 2/22/24.     - Wound vac changed 2/29 with wound care  - HD held today per nephro - continue lasix gtt at 5cc/h    - s/p IR placement of HD line 2/29    - discontinue LIJ CVL  - Continue TF   - LP 2/16 - NGTD  - ID consulted for urine cultures   - UA growing Burkholderia species and Chryseibacterium Indologenes,  IV zosyn completed 2/22  - Palliative following, full code  - Stable on trach collar  - Okay for DVT ppx   - Remainder of care per SICU    Dispo: LTAC placement once line in place        Celia Allison MD  General Surgery  [facility]

## 2024-03-01 NOTE — PROGRESS NOTES
[facility]  Nephrology  Progress Note    Patient Name: Sarah Saravia  MRN: [id number]  Admission Date: 1/29/2024  Hospital Length of Stay: 32 days  Attending Provider: Steve Cole MD   Primary Care Physician: [facility]  Principal Problem:Ayaz's gangrene    Subjective:     HPI: Sarah Saravia is a 53 year old female with a past medical history of obesity (BMI 53) admitted with N/V and R groin wound found to be in DKA at OSH.  She underwent a CT abdomen and pelvis that showed extensive soft tissue air throughout the right groin and inguinal region and extending to involve the right lower quadrant anterior abdominal wall with extensive soft tissue air also seen involving the proximal medial aspect of the right thigh, concerning for gas-forming infection. She is s/p OR debridement for necrotizing fascitis on 1/29/24 and take back for 1/31/24. Right groin tissue growing proteus, susceptibilities still pending. Currently on meropenem and clindamycin which was d/c'd on 1/31/24. While at OSH pt developed acute respiratory failure requiring intubation. Nephrology was consulted for worsening alvarado in the setting of lactic acidosis and septic shock likely ATN, sCr elevated at 3.8 on admit.  OR today for debridement with possible closure and wound vac placement. Last HD 2/1. Net -1.3L. Electrolytes stable. Nephrology consulted for ALVARADO.     Interval History:     No acute events overnight. Mentation unchanged. Awaiting AM labs to be drawn.     Review of patient's allergies indicates:  No Known Allergies  Current Facility-Administered Medications   Medication Frequency    0.9%  NaCl infusion PRN    0.9%  NaCl infusion Once    acetaminophen tablet 650 mg Q4H PRN    albuterol-ipratropium 2.5 mg-0.5 mg/3 mL nebulizer solution 3 mL Q4H PRN    amLODIPine tablet 10 mg Daily    carvediloL tablet 25 mg BID    dextrose 10% bolus 125 mL 125 mL PRN    dextrose 10% bolus 250 mL 250 mL PRN    glucagon  (human recombinant) injection 1 mg PRN    glucose chewable tablet 16 g PRN    glucose chewable tablet 24 g PRN    heparin (porcine) injection 7,500 Units Q8H    hydrALAZINE tablet 25 mg Q8H PRN    insulin aspart U-100 pen 0-10 Units Q4H PRN    insulin aspart U-100 pen 6 Units Q4H    insulin detemir U-100 (Levemir) pen 14 Units Daily    labetalol 20 mg/4 mL (5 mg/mL) IV syring Q6H PRN    naloxone 0.4 mg/mL injection 0.02 mg PRN    ondansetron injection 4 mg Q6H PRN    prochlorperazine injection Soln 5 mg Q6H PRN    psyllium husk (aspartame) 3.4 gram packet 1 packet Daily    sevelamer carbonate pwpk 1.6 g TID    sodium chloride 0.9% bolus 250 mL 250 mL PRN    sodium chloride 0.9% flush 10 mL PRN       Objective:     Vital Signs (Most Recent):  Temp: 97.9 °F (36.6 °C) (03/01/24 1114)  Pulse: 98 (03/01/24 1114)  Resp: 16 (03/01/24 1114)  BP: (!) 151/85 (03/01/24 1114)  SpO2: 98 % (03/01/24 1114) Vital Signs (24h Range):  Temp:  [97.9 °F (36.6 °C)-99.1 °F (37.3 °C)] 97.9 °F (36.6 °C)  Pulse:  [87-99] 98  Resp:  [16-28] 16  SpO2:  [94 %-100 %] 98 %  BP: (139-179)/(71-99) 151/85     Weight: (!) 140.2 kg (309 lb) (02/29/24 0300)  Body mass index is 51.42 kg/m².  Body surface area is 2.54 meters squared.    I/O last 3 completed shifts:  In: 1173 [I.V.:7; NG/GT:916; IV Piggyback:250]  Out: 1455 [Urine:1425; Other:25; Stool:5]     Physical Exam  Vitals and nursing note reviewed.   Constitutional:       General: She is not in acute distress.     Appearance: She is ill-appearing.   HENT:      Head: Normocephalic and atraumatic.   Eyes:      Comments: Not opening eyes to command    Neck:      Comments: Left IJ Trialysis catheter  Cardiovascular:      Rate and Rhythm: Normal rate and regular rhythm.   Pulmonary:      Effort: Pulmonary effort is normal.      Comments: trached    Abdominal:      General: There is no distension.      Palpations: Abdomen is soft.      Tenderness: There is no abdominal tenderness.      Comments: PEG in  place   Genitourinary:     Comments: Glynn in place    Skin:     General: Skin is warm and dry.      Comments: Wound vac in place to R thigh/groin to suction  Re approximated skin clean dry and intact   Neurological:      Comments: Occasionally withdraws from painful stimuli. No significant purposeful activity           Significant Labs:  All labs within the past 24 hours have been reviewed.     Significant Imaging:  Labs: Reviewed  Assessment/Plan:     Neuro  Cerebral infarction  Per primary team.     Renal/  * Ayaz's gangrene  - management per primary     ALVARADO (acute kidney injury)  Transfer from [facility]. Admitted for fourniers gangrene. Initiated HD on 1/31. ALVARADO 2/2 ATN in setting of septic shock. Arrived with CR 3.8. Unknown baseline. S/P OR debridement with possible closure and wound vac placement     ALVARADO most likley ATN in setting of septic shock     Plan/Recommendation  - No urgent need for RRT, will evaluate need on daily basis.   - If continues to have renal recovery can plan for perm cath removal in near future.   - need RFP daily to watch for renal recovery  - Please dose all ABX in accordance to RRT schedule (zosyn, etc)  -Daily RFP   -Strict I&Os  -Avoid nephrotoxins, if possible         Thank you for your consult. I will follow-up with patient. Please contact us if you have any additional questions.    Case discussed with attending. Attestation to follow.       Antione Hazel DO  Nephrology  [facility]

## 2024-03-01 NOTE — ANESTHESIA POSTPROCEDURE EVALUATION
Anesthesia Post Evaluation    Patient: Sarah Saravia    Procedure(s) Performed: * No procedures listed *    Final Anesthesia Type: general      Patient location during evaluation: med/surg floor  Patient participation: No - Unable to Participate, Coma/Other Inability to Communicate  Level of consciousness: responds to stimulation  Post-procedure vital signs: reviewed and stable  Pain management: adequate  Airway patency: patent    PONV status at discharge: No PONV  Anesthetic complications: no      Cardiovascular status: stable  Respiratory status: unassisted, spontaneous ventilation and Tracheostomy (trach collar)  Hydration status: euvolemic  Follow-up not needed.              Vitals Value Taken Time   /88 03/01/24 0729   Temp 36.7 °C (98.1 °F) 03/01/24 0729   Pulse 98 03/01/24 0729   Resp 18 03/01/24 0729   SpO2 100 % 03/01/24 0729         No case tracking events are documented in the log.      Pain/Lucía Score: Lucía Score: 10 (2/29/2024  4:40 PM)

## 2024-03-01 NOTE — PLAN OF CARE
Problem: Adult Inpatient Plan of Care  Goal: Plan of Care Review  Outcome: Ongoing, Progressing  Goal: Patient-Specific Goal (Individualized)  Outcome: Ongoing, Progressing  Goal: Absence of Hospital-Acquired Illness or Injury  Outcome: Ongoing, Progressing  Goal: Optimal Comfort and Wellbeing  Outcome: Ongoing, Progressing  Goal: Readiness for Transition of Care  Outcome: Ongoing, Progressing     Problem: Bariatric Environmental Safety  Goal: Safety Maintained with Care  Outcome: Ongoing, Progressing     Problem: Diabetes Comorbidity  Goal: Blood Glucose Level Within Targeted Range  Outcome: Ongoing, Progressing     Problem: Adjustment to Illness (Sepsis/Septic Shock)  Goal: Optimal Coping  Outcome: Ongoing, Progressing     Problem: Bleeding (Sepsis/Septic Shock)  Goal: Absence of Bleeding  Outcome: Ongoing, Progressing     Problem: Glycemic Control Impaired (Sepsis/Septic Shock)  Goal: Blood Glucose Level Within Desired Range  Outcome: Ongoing, Progressing     Problem: Infection Progression (Sepsis/Septic Shock)  Goal: Absence of Infection Signs and Symptoms  Outcome: Ongoing, Progressing     Problem: Nutrition Impaired (Sepsis/Septic Shock)  Goal: Optimal Nutrition Intake  Outcome: Ongoing, Progressing     Problem: Diabetic Ketoacidosis  Goal: Fluid and Electrolyte Balance with Absence of Ketosis  Outcome: Ongoing, Progressing     Problem: Fluid and Electrolyte Imbalance (Acute Kidney Injury/Impairment)  Goal: Fluid and Electrolyte Balance  Outcome: Ongoing, Progressing     Problem: Oral Intake Inadequate (Acute Kidney Injury/Impairment)  Goal: Optimal Nutrition Intake  Outcome: Ongoing, Progressing     Problem: Renal Function Impairment (Acute Kidney Injury/Impairment)  Goal: Effective Renal Function  Outcome: Ongoing, Progressing     Problem: Infection  Goal: Absence of Infection Signs and Symptoms  Outcome: Ongoing, Progressing     Problem: Device-Related Complication Risk (Hemodialysis)  Goal: Safe,  Effective Therapy Delivery  Outcome: Ongoing, Progressing     Problem: Hemodynamic Instability (Hemodialysis)  Goal: Effective Tissue Perfusion  Outcome: Ongoing, Progressing     Problem: Infection (Hemodialysis)  Goal: Absence of Infection Signs and Symptoms  Outcome: Ongoing, Progressing     Problem: Fall Injury Risk  Goal: Absence of Fall and Fall-Related Injury  Outcome: Ongoing, Progressing     Problem: Device-Related Complication Risk (CRRT (Continuous Renal Replacement Therapy))  Goal: Safe, Effective Therapy Delivery  Outcome: Ongoing, Progressing     Problem: Hypothermia (CRRT (Continuous Renal Replacement Therapy))  Goal: Body Temperature Maintained in Desired Range  Outcome: Ongoing, Progressing     Problem: Infection (CRRT (Continuous Renal Replacement Therapy))  Goal: Absence of Infection Signs and Symptoms  Outcome: Ongoing, Progressing     Problem: Communication Impairment (Mechanical Ventilation, Invasive)  Goal: Effective Communication  Outcome: Ongoing, Progressing     Problem: Device-Related Complication Risk (Mechanical Ventilation, Invasive)  Goal: Optimal Device Function  Outcome: Ongoing, Progressing     Problem: Inability to Wean (Mechanical Ventilation, Invasive)  Goal: Mechanical Ventilation Liberation  Outcome: Ongoing, Progressing     Problem: Nutrition Impairment (Mechanical Ventilation, Invasive)  Goal: Optimal Nutrition Delivery  Outcome: Ongoing, Progressing     Problem: Skin and Tissue Injury (Mechanical Ventilation, Invasive)  Goal: Absence of Device-Related Skin and Tissue Injury  Outcome: Ongoing, Progressing     Problem: Ventilator-Induced Lung Injury (Mechanical Ventilation, Invasive)  Goal: Absence of Ventilator-Induced Lung Injury  Outcome: Ongoing, Progressing     Problem: Coping Ineffective  Goal: Effective Coping  Outcome: Ongoing, Progressing

## 2024-03-01 NOTE — SUBJECTIVE & OBJECTIVE
Interval History:     No acute events overnight. Mentation unchanged. Awaiting AM labs to be drawn.     Review of patient's allergies indicates:  No Known Allergies  Current Facility-Administered Medications   Medication Frequency    0.9%  NaCl infusion PRN    0.9%  NaCl infusion Once    acetaminophen tablet 650 mg Q4H PRN    albuterol-ipratropium 2.5 mg-0.5 mg/3 mL nebulizer solution 3 mL Q4H PRN    amLODIPine tablet 10 mg Daily    carvediloL tablet 25 mg BID    dextrose 10% bolus 125 mL 125 mL PRN    dextrose 10% bolus 250 mL 250 mL PRN    glucagon (human recombinant) injection 1 mg PRN    glucose chewable tablet 16 g PRN    glucose chewable tablet 24 g PRN    heparin (porcine) injection 7,500 Units Q8H    hydrALAZINE tablet 25 mg Q8H PRN    insulin aspart U-100 pen 0-10 Units Q4H PRN    insulin aspart U-100 pen 6 Units Q4H    insulin detemir U-100 (Levemir) pen 14 Units Daily    labetalol 20 mg/4 mL (5 mg/mL) IV syring Q6H PRN    naloxone 0.4 mg/mL injection 0.02 mg PRN    ondansetron injection 4 mg Q6H PRN    prochlorperazine injection Soln 5 mg Q6H PRN    psyllium husk (aspartame) 3.4 gram packet 1 packet Daily    sevelamer carbonate pwpk 1.6 g TID    sodium chloride 0.9% bolus 250 mL 250 mL PRN    sodium chloride 0.9% flush 10 mL PRN       Objective:     Vital Signs (Most Recent):  Temp: 97.9 °F (36.6 °C) (03/01/24 1114)  Pulse: 98 (03/01/24 1114)  Resp: 16 (03/01/24 1114)  BP: (!) 151/85 (03/01/24 1114)  SpO2: 98 % (03/01/24 1114) Vital Signs (24h Range):  Temp:  [97.9 °F (36.6 °C)-99.1 °F (37.3 °C)] 97.9 °F (36.6 °C)  Pulse:  [87-99] 98  Resp:  [16-28] 16  SpO2:  [94 %-100 %] 98 %  BP: (139-179)/(71-99) 151/85     Weight: (!) 140.2 kg (309 lb) (02/29/24 0300)  Body mass index is 51.42 kg/m².  Body surface area is 2.54 meters squared.    I/O last 3 completed shifts:  In: 1173 [I.V.:7; NG/GT:916; IV Piggyback:250]  Out: 1455 [Urine:1425; Other:25; Stool:5]     Physical Exam  Vitals and nursing note reviewed.    Constitutional:       General: She is not in acute distress.     Appearance: She is ill-appearing.   HENT:      Head: Normocephalic and atraumatic.   Eyes:      Comments: Not opening eyes to command    Neck:      Comments: Left IJ Trialysis catheter  Cardiovascular:      Rate and Rhythm: Normal rate and regular rhythm.   Pulmonary:      Effort: Pulmonary effort is normal.      Comments: trached    Abdominal:      General: There is no distension.      Palpations: Abdomen is soft.      Tenderness: There is no abdominal tenderness.      Comments: PEG in place   Genitourinary:     Comments: Glynn in place    Skin:     General: Skin is warm and dry.      Comments: Wound vac in place to R thigh/groin to suction  Re approximated skin clean dry and intact   Neurological:      Comments: Occasionally withdraws from painful stimuli. No significant purposeful activity           Significant Labs:  All labs within the past 24 hours have been reviewed.     Significant Imaging:  Labs: Reviewed

## 2024-03-01 NOTE — ASSESSMENT & PLAN NOTE
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 Unfortunately, has multiple infarcts on MRI brain with unchanged neuro status, however, family would like to proceed with all measures. S/p trach, peg, and lower wound closure, replacement of wound vac in right groin on 2/22/24.     - Wound vac changed 2/29 with wound care  - HD held today per nephro - continue lasix gtt at 5cc/h    - s/p IR placement of HD line 2/29    - discontinue LIJ CVL  - Continue TF   - LP 2/16 - NGTD  - ID consulted for urine cultures   - UA growing Burkholderia species and Chryseibacterium Indologenes,  IV zosyn completed 2/22  - Palliative following, full code  - Stable on trach collar  - Okay for DVT ppx   - Remainder of care per SICU    Dispo: LTAC placement once line in place

## 2024-03-02 LAB
ALBUMIN SERPL BCP-MCNC: 2.6 G/DL (ref 3.5–5.2)
ALP SERPL-CCNC: 165 U/L (ref 55–135)
ALT SERPL W/O P-5'-P-CCNC: 30 U/L (ref 10–44)
ANION GAP SERPL CALC-SCNC: 15 MMOL/L (ref 8–16)
AST SERPL-CCNC: 40 U/L (ref 10–40)
BASOPHILS # BLD AUTO: 0.06 K/UL (ref 0–0.2)
BASOPHILS NFR BLD: 0.5 % (ref 0–1.9)
BILIRUB SERPL-MCNC: 0.2 MG/DL (ref 0.1–1)
BUN SERPL-MCNC: 99 MG/DL (ref 6–20)
CALCIUM SERPL-MCNC: 9.6 MG/DL (ref 8.7–10.5)
CHLORIDE SERPL-SCNC: 98 MMOL/L (ref 95–110)
CO2 SERPL-SCNC: 21 MMOL/L (ref 23–29)
CREAT SERPL-MCNC: 3.9 MG/DL (ref 0.5–1.4)
DIFFERENTIAL METHOD BLD: ABNORMAL
EOSINOPHIL # BLD AUTO: 0.5 K/UL (ref 0–0.5)
EOSINOPHIL NFR BLD: 4.4 % (ref 0–8)
ERYTHROCYTE [DISTWIDTH] IN BLOOD BY AUTOMATED COUNT: 16.9 % (ref 11.5–14.5)
EST. GFR  (NO RACE VARIABLE): 13.2 ML/MIN/1.73 M^2
GLUCOSE SERPL-MCNC: 166 MG/DL (ref 70–110)
HCT VFR BLD AUTO: 28 % (ref 37–48.5)
HGB BLD-MCNC: 8.7 G/DL (ref 12–16)
IMM GRANULOCYTES # BLD AUTO: 0.08 K/UL (ref 0–0.04)
IMM GRANULOCYTES NFR BLD AUTO: 0.7 % (ref 0–0.5)
LYMPHOCYTES # BLD AUTO: 2.4 K/UL (ref 1–4.8)
LYMPHOCYTES NFR BLD: 21.9 % (ref 18–48)
MAGNESIUM SERPL-MCNC: 2.2 MG/DL (ref 1.6–2.6)
MCH RBC QN AUTO: 27.6 PG (ref 27–31)
MCHC RBC AUTO-ENTMCNC: 31.1 G/DL (ref 32–36)
MCV RBC AUTO: 89 FL (ref 82–98)
MONOCYTES # BLD AUTO: 1.2 K/UL (ref 0.3–1)
MONOCYTES NFR BLD: 10.7 % (ref 4–15)
NEUTROPHILS # BLD AUTO: 6.8 K/UL (ref 1.8–7.7)
NEUTROPHILS NFR BLD: 61.8 % (ref 38–73)
NRBC BLD-RTO: 0 /100 WBC
PHOSPHATE SERPL-MCNC: 6.6 MG/DL (ref 2.7–4.5)
PLATELET # BLD AUTO: 582 K/UL (ref 150–450)
PMV BLD AUTO: 9.9 FL (ref 9.2–12.9)
POCT GLUCOSE: 165 MG/DL (ref 70–110)
POCT GLUCOSE: 171 MG/DL (ref 70–110)
POCT GLUCOSE: 181 MG/DL (ref 70–110)
POCT GLUCOSE: 189 MG/DL (ref 70–110)
POCT GLUCOSE: 211 MG/DL (ref 70–110)
POTASSIUM SERPL-SCNC: 5.1 MMOL/L (ref 3.5–5.1)
PROT SERPL-MCNC: 8.7 G/DL (ref 6–8.4)
RBC # BLD AUTO: 3.15 M/UL (ref 4–5.4)
SODIUM SERPL-SCNC: 134 MMOL/L (ref 136–145)
WBC # BLD AUTO: 10.95 K/UL (ref 3.9–12.7)

## 2024-03-02 PROCEDURE — 99233 SBSQ HOSP IP/OBS HIGH 50: CPT | Mod: ,,, | Performed by: INTERNAL MEDICINE

## 2024-03-02 PROCEDURE — 99900035 HC TECH TIME PER 15 MIN (STAT)

## 2024-03-02 PROCEDURE — 80053 COMPREHEN METABOLIC PANEL: CPT

## 2024-03-02 PROCEDURE — 83735 ASSAY OF MAGNESIUM: CPT

## 2024-03-02 PROCEDURE — 27000221 HC OXYGEN, UP TO 24 HOURS

## 2024-03-02 PROCEDURE — 20600001 HC STEP DOWN PRIVATE ROOM

## 2024-03-02 PROCEDURE — 85025 COMPLETE CBC W/AUTO DIFF WBC: CPT

## 2024-03-02 PROCEDURE — 94761 N-INVAS EAR/PLS OXIMETRY MLT: CPT

## 2024-03-02 PROCEDURE — 25000003 PHARM REV CODE 250

## 2024-03-02 PROCEDURE — A4216 STERILE WATER/SALINE, 10 ML: HCPCS

## 2024-03-02 PROCEDURE — 25000242 PHARM REV CODE 250 ALT 637 W/ HCPCS

## 2024-03-02 PROCEDURE — 27200966 HC CLOSED SUCTION SYSTEM

## 2024-03-02 PROCEDURE — 99232 SBSQ HOSP IP/OBS MODERATE 35: CPT | Mod: ,,, | Performed by: NURSE PRACTITIONER

## 2024-03-02 PROCEDURE — 99900026 HC AIRWAY MAINTENANCE (STAT)

## 2024-03-02 PROCEDURE — 27000207 HC ISOLATION

## 2024-03-02 PROCEDURE — 84100 ASSAY OF PHOSPHORUS: CPT

## 2024-03-02 PROCEDURE — 63600175 PHARM REV CODE 636 W HCPCS

## 2024-03-02 RX ADMIN — INSULIN ASPART 6 UNITS: 100 INJECTION, SOLUTION INTRAVENOUS; SUBCUTANEOUS at 09:03

## 2024-03-02 RX ADMIN — HEPARIN SODIUM 7500 UNITS: 5000 INJECTION INTRAVENOUS; SUBCUTANEOUS at 05:03

## 2024-03-02 RX ADMIN — INSULIN ASPART 6 UNITS: 100 INJECTION, SOLUTION INTRAVENOUS; SUBCUTANEOUS at 04:03

## 2024-03-02 RX ADMIN — Medication 10 ML: at 04:03

## 2024-03-02 RX ADMIN — AMLODIPINE BESYLATE 10 MG: 10 TABLET ORAL at 08:03

## 2024-03-02 RX ADMIN — INSULIN ASPART 1 UNITS: 100 INJECTION, SOLUTION INTRAVENOUS; SUBCUTANEOUS at 12:03

## 2024-03-02 RX ADMIN — INSULIN ASPART 2 UNITS: 100 INJECTION, SOLUTION INTRAVENOUS; SUBCUTANEOUS at 08:03

## 2024-03-02 RX ADMIN — INSULIN ASPART 1 UNITS: 100 INJECTION, SOLUTION INTRAVENOUS; SUBCUTANEOUS at 04:03

## 2024-03-02 RX ADMIN — CARVEDILOL 25 MG: 25 TABLET, FILM COATED ORAL at 09:03

## 2024-03-02 RX ADMIN — INSULIN ASPART 2 UNITS: 100 INJECTION, SOLUTION INTRAVENOUS; SUBCUTANEOUS at 05:03

## 2024-03-02 RX ADMIN — INSULIN ASPART 6 UNITS: 100 INJECTION, SOLUTION INTRAVENOUS; SUBCUTANEOUS at 08:03

## 2024-03-02 RX ADMIN — SEVELAMER CARBONATE 1.6 G: 800 POWDER, FOR SUSPENSION ORAL at 02:03

## 2024-03-02 RX ADMIN — INSULIN DETEMIR 14 UNITS: 100 INJECTION, SOLUTION SUBCUTANEOUS at 08:03

## 2024-03-02 RX ADMIN — INSULIN ASPART 6 UNITS: 100 INJECTION, SOLUTION INTRAVENOUS; SUBCUTANEOUS at 05:03

## 2024-03-02 RX ADMIN — INSULIN ASPART 4 UNITS: 100 INJECTION, SOLUTION INTRAVENOUS; SUBCUTANEOUS at 12:03

## 2024-03-02 RX ADMIN — HEPARIN SODIUM 7500 UNITS: 5000 INJECTION INTRAVENOUS; SUBCUTANEOUS at 10:03

## 2024-03-02 RX ADMIN — INSULIN ASPART 6 UNITS: 100 INJECTION, SOLUTION INTRAVENOUS; SUBCUTANEOUS at 12:03

## 2024-03-02 RX ADMIN — HEPARIN SODIUM 7500 UNITS: 5000 INJECTION INTRAVENOUS; SUBCUTANEOUS at 02:03

## 2024-03-02 RX ADMIN — PSYLLIUM HUSK 1 PACKET: 3.4 POWDER ORAL at 08:03

## 2024-03-02 RX ADMIN — INSULIN ASPART 1 UNITS: 100 INJECTION, SOLUTION INTRAVENOUS; SUBCUTANEOUS at 09:03

## 2024-03-02 RX ADMIN — SEVELAMER CARBONATE 1.6 G: 800 POWDER, FOR SUSPENSION ORAL at 08:03

## 2024-03-02 RX ADMIN — CARVEDILOL 25 MG: 25 TABLET, FILM COATED ORAL at 08:03

## 2024-03-02 RX ADMIN — SEVELAMER CARBONATE 1.6 G: 800 POWDER, FOR SUSPENSION ORAL at 09:03

## 2024-03-02 NOTE — PROGRESS NOTES
"[facility] - [facility]  Endocrinology  Progress Note    Admit Date: 1/29/2024     Reason for Consult: Management of T2DM, Hyperglycemia     Surgical Procedure and Date: n/a    Diabetes diagnosis year: new onset     Home Diabetes Medications:  none per chart review and family present at bedside     Lab Results   Component Value Date    HGBA1C 10.4 (H) 01/30/2024       Diabetes Complications include:     Hyperglycemia, DKA at OSH     Complicating diabetes co morbidities:   Obesity       HPI:   Patient is a 53 y.o. female with a diagnosis of obesity (BMI 53) admitted with N/V and R groin wound found to be in DKA at OSH. 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 While at OSH pt developed acute respiratory failure requiring intubation. Pulmonology was consulted for ventilator management and critical illness. Nephrology was consulted for worsening vishal in the setting of lactic acidosis and septic shock likely ATN, sCr elevated at 3.8 on admit now 4.0 post HD. Pt being admitted to SICU for surgical critical care management. Immediate plans include hemodynamic stabilization, weaning of respiratory support, and RRT.  Endocrinology consulted for management of T2DM.                 Interval HPI:   Overnight events: Remains in [facility] POD 9. BG reasonably controlled on current SQ insulin regimen. Creatinine 2.9. Diet NPO    Eating:   NPO  Nausea: No  Hypoglycemia and intervention: No  Fever: No  TPN and/or TF: Yes  If yes, type of TF/TPN and rate: TFs at 50 ml/hr     BP (!) 153/83 (BP Location: Right arm, Patient Position: Lying)   Pulse 90   Temp 99.9 °F (37.7 °C) (Axillary)   Resp 16   Ht 5' 5" (1.651 m)   Wt (!) 140.2 kg (309 lb)   LMP 01/01/2024 (Approximate) Comment: tubal ligation  SpO2 95%   BMI 51.42 kg/m²     Labs Reviewed and Include    Recent Labs   Lab 03/02/24  0456   *   CALCIUM 9.6   ALBUMIN 2.6*   PROT 8.7* " "  *   K 5.1   CO2 21*   CL 98   BUN 99*   CREATININE 3.9*   ALKPHOS 165*   ALT 30   AST 40   BILITOT 0.2     Lab Results   Component Value Date    WBC 10.95 03/02/2024    HGB 8.7 (L) 03/02/2024    HCT 28.0 (L) 03/02/2024    MCV 89 03/02/2024     (H) 03/02/2024     No results for input(s): "TSH", "FREET4" in the last 168 hours.  Lab Results   Component Value Date    HGBA1C 10.4 (H) 01/30/2024       Nutritional status:   Body mass index is 51.42 kg/m².  Lab Results   Component Value Date    ALBUMIN 2.6 (L) 03/02/2024    ALBUMIN 2.6 (L) 03/01/2024    ALBUMIN 2.4 (L) 02/29/2024     No results found for: "PREALBUMIN"    Estimated Creatinine Clearance: 23.8 mL/min (A) (based on SCr of 3.9 mg/dL (H)).    Accu-Checks  Recent Labs     02/29/24  1742 02/29/24  2013 03/01/24  0020 03/01/24  0436 03/01/24  0726 03/01/24  1114 03/01/24  1642 03/01/24  2027 03/01/24  2335 03/02/24  0419   POCTGLUCOSE 168* 172* 175* 168* 164* 211* 172* 172* 155* 171*       Current Medications and/or Treatments Impacting Glycemic Control  Immunotherapy:    Immunosuppressants       None          Steroids:   Hormones (From admission, onward)      None          Pressors:    Autonomic Drugs (From admission, onward)      None          Hyperglycemia/Diabetes Medications:   Antihyperglycemics (From admission, onward)      Start     Stop Route Frequency Ordered    02/27/24 1354  insulin aspart U-100 pen 0-10 Units         -- SubQ Every 4 hours PRN 02/27/24 1255    02/13/24 0915  insulin detemir U-100 (Levemir) pen 14 Units         -- SubQ Daily 02/13/24 0906    02/09/24 1200  insulin aspart U-100 pen 6 Units         -- SubQ Every 4 hours 02/09/24 0901            ASSESSMENT and PLAN    Renal/  * Ayaz's gangrene  Managed per primary team  Optimize BG control        ALVARADO (acute kidney injury)  Lab Results   Component Value Date    CREATININE 3.9 (H) 03/02/2024     Avoid insulin stacking  Titrate insulin slowly       Endocrine  Morbid " (severe) obesity due to excess calories  Body mass index is 51.42 kg/m².  May increase insulin resistance.         New onset type 2 diabetes mellitus  BG goal 140-180  No previous hx of T2DM per family at bedside. A1c of 10.4. DKA at OSH. On TF. BG stable on regimen.        Plan:  - Continue Levemir 14 units daily.  - Increase Novolog to 7 units q 4 hrs while on tube feeding (HOLD if tube feeding is stopped or BG < 100) increased based on prn SQ insulin requirements.   - Continue Moderate Dose Correction Scale  - BG monitoring q 4 hrs while NPO /TF    ** Please call Endocrine for any BG related issues **      Discharge plans: TBD    Lab Results   Component Value Date    HGBA1C 10.4 (H) 01/30/2024             Zoie Muñiz NP  Endocrinology  [facility]

## 2024-03-02 NOTE — ASSESSMENT & PLAN NOTE
BG goal 140-180  No previous hx of T2DM per family at bedside. A1c of 10.4. DKA at OSH. On TF. BG stable on regimen.        Plan:  - Continue Levemir 14 units daily.  - Increase Novolog to 7 units q 4 hrs while on tube feeding (HOLD if tube feeding is stopped or BG < 100) increased based on prn SQ insulin requirements.   - Continue Moderate Dose Correction Scale  - BG monitoring q 4 hrs while NPO /TF    ** Please call Endocrine for any BG related issues **      Discharge plans: TBD    Lab Results   Component Value Date    HGBA1C 10.4 (H) 01/30/2024

## 2024-03-02 NOTE — SUBJECTIVE & OBJECTIVE
Interval History: Febrile to 100.4 overnight. Other VSS. WBC and Hg stable from yesterday. Wound vac with good suction.     Medications:  Continuous Infusions:  Scheduled Meds:   sodium chloride 0.9%   Intravenous Once    amLODIPine  10 mg Per G Tube Daily    carvediloL  25 mg Per G Tube BID    heparin (porcine)  7,500 Units Subcutaneous Q8H    insulin aspart U-100  6 Units Subcutaneous Q4H    insulin detemir U-100  14 Units Subcutaneous Daily    psyllium husk (aspartame)  1 packet Per G Tube Daily    sevelamer carbonate  1.6 g Per G Tube TID     PRN Meds:sodium chloride 0.9%, acetaminophen, albuterol-ipratropium, dextrose 10%, dextrose 10%, glucagon (human recombinant), glucose, glucose, hydrALAZINE, insulin aspart U-100, labetalol, naloxone, ondansetron, prochlorperazine, sodium chloride 0.9%, sodium chloride 0.9%     Review of patient's allergies indicates:  No Known Allergies  Objective:     Vital Signs (Most Recent):  Temp: 98.2 °F (36.8 °C) (03/02/24 0820)  Pulse: 96 (03/02/24 0820)  Resp: 16 (03/02/24 0820)  BP: (!) 150/85 (03/02/24 0820)  SpO2: 99 % (03/02/24 0820) Vital Signs (24h Range):  Temp:  [97.9 °F (36.6 °C)-100.4 °F (38 °C)] 98.2 °F (36.8 °C)  Pulse:  [90-99] 96  Resp:  [16-19] 16  SpO2:  [92 %-100 %] 99 %  BP: (142-161)/(75-85) 150/85     Weight: (!) 140.2 kg (309 lb)  Body mass index is 51.42 kg/m².    Intake/Output - Last 3 Shifts         02/29 0700 03/01 0659 03/01 0700 03/02 0659 03/02 0700 03/03 0659    P.O.  0     I.V. (mL/kg) 1 (0)      NG/ 548     IV Piggyback 250      Total Intake(mL/kg) 917 (6.5) 548 (3.9)     Urine (mL/kg/hr) 825 (0.2) 1100 (0.3)     Drains 0 0     Other 25 35     Stool 5 2     Total Output 855 1137     Net +62 -589            Urine Occurrence 2 x      Stool Occurrence 4 x 2 x              Physical Exam  Vitals and nursing note reviewed.   Constitutional:       General: She is not in acute distress.     Appearance: She is ill-appearing.   HENT:      Head:  Normocephalic and atraumatic.   Eyes:      Comments: Not opening eyes to command    Neck:      Comments: Left IJ Trialysis catheter  Cardiovascular:      Rate and Rhythm: Normal rate and regular rhythm.   Pulmonary:      Effort: Pulmonary effort is normal.      Comments: trached    Abdominal:      General: There is no distension.      Palpations: Abdomen is soft.      Tenderness: There is no abdominal tenderness.      Comments: PEG in place   Genitourinary:     Comments: Glynn in place    Skin:     General: Skin is warm and dry.      Comments: Wound vac in place to R thigh/groin to suction  Re approximated skin clean dry and intact   Neurological:      Comments: Occasionally withdraws from painful stimuli. No significant purposeful activity           Significant Labs:  I have reviewed all pertinent lab results within the past 24 hours.    Significant Diagnostics:  I have reviewed all pertinent imaging results/findings within the past 24 hours.

## 2024-03-02 NOTE — PLAN OF CARE
[facility] Plan of Care Note  Dx rso's gangrene     Shift Events: low grade temps     Goals of Care: dc to LTAC     Neuro: PABLO, withdraws to pain     Vital Signs: temp. 100.4, resolved with cool cloth and colder room temp     Respiratory: trach- 8 shiley semi cuffed. Trach collar 5L 28%     Diet: npo     Is patient tolerating current diet? yes     GTTS: none     Urine Output/Bowel Movement: adequate 675- meza lbm 3/2     Drains/Tubes/Tube Feeds (include total output/shift): pinrose drains x2, wound vac to R groin, (reinforced), L abd peg---> TF peptamen intense VHP 50cc/hr, meza, fms     Lines: L trialysis cath (orders to pull but not in nursing scope of practice), tunneled catheter, piv        Accuchecks:q4     Skin: R groin wound vac, R groin sutures, pinrose drains x2     Fall Risk Score: 11     Activity level? BB     Any scheduled procedures? none     Any safety concerns? Falls, aspiration precautions     Other: n/a

## 2024-03-02 NOTE — ASSESSMENT & PLAN NOTE
Transfer from [facility]. Admitted for fourniers gangrene. Initiated HD on 1/31. ALVARADO 2/2 ATN in setting of septic shock. Arrived with CR 3.8. Unknown baseline. S/P OR debridement with possible closure and wound vac placement     ALVARADO most likley ATN in setting of septic shock; ATN recovering.   UOP:     Intake/Output Summary (Last 24 hours) at 3/2/2024 0913  Last data filed at 3/2/2024 0534  Gross per 24 hour   Intake 548 ml   Output 1137 ml   Net -589 ml         Plan/Recommendation  - No urgent need for RRT, will evaluate need on daily basis.   - Would HOLD on perm cath placement for now as patient with some signs of renal recovery  - need RFP daily to watch for renal recovery  - Please dose all ABX in accordance to RRT schedule (zosyn, etc)  -Daily RFP   -Strict I&Os  -Avoid nephrotoxins, if possible

## 2024-03-02 NOTE — PLAN OF CARE
[facility] - [facility]  Discharge Reassessment    Primary Care Provider: [facility] - [facility]    Expected Discharge Date: 3/4/2024    Reassessment (most recent)       Discharge Reassessment - 03/01/24 2148          Discharge Reassessment    Assessment Type Discharge Planning Reassessment     Did the patient's condition or plan change since previous assessment? No     Discharge Plan discussed with: Patient     Communicated ZEKE with patient/caregiver Yes     Discharge Plan A Long-term acute care facility (LTAC)     Discharge Plan B Rehab     Transition of Care Barriers None     Why the patient remains in the hospital Requires continued medical care                   Patient cleared from Surgery team for discharge to [facility]. [facility] has Auth.     Patient has to have a HD run through new line before can go to [facility].     After HD through new line patient will be able to discharge to [facility].

## 2024-03-02 NOTE — RESPIRATORY THERAPY
"RAPID RESPONSE RESPIRATORY THERAPY PROACTIVE NOTE           Time of visit: 920     Code Status: Full Code   : 1970  Bed: 1046/1046 A:   MRN: [id number]  Time spent at the bedside: < 15 min    SITUATION    Evaluated patient for: LDA Check     BACKGROUND    Patient has no past medical history on file.  Clinically Significant Surgical Hx: tracheostomy    24 Hours Vitals Range:  Temp:  [97.9 °F (36.6 °C)-100.4 °F (38 °C)]   Pulse:  [90-99]   Resp:  [16-18]   BP: (142-161)/(75-85)   SpO2:  [92 %-100 %]     Labs:    Recent Labs     24  0236 24  1435 24  0456   * 135* 134*   K 4.8 5.3* 5.1    99 98   CO2 22* 21* 21*   BUN 75* 93* 99*   CREATININE 3.8* 3.7* 3.9*   * 174* 166*   PHOS 5.7* 6.7* 6.6*   MG 2.1 2.2 2.2        No results for input(s): "PH", "PCO2", "PO2", "HCO3", "POCSATURATED", "BE" in the last 72 hours.    ASSESSMENT/INTERVENTIONS  Pt resting comfortably, no respiratory concern during visit.      Last VS   Temp: 98.2 °F (36.8 °C) (820)  Pulse: 96 (820)  Resp: 16 (820)  BP: 150/85 (820)  SpO2: 99 % (820)      Extra trachs at bedside: 8.0 & 6.0 Shiley cuffed  Level of Consciousness: Level of Consciousness (AVPU): alert  Respiratory Effort: Respiratory Effort: Unlabored Expansion/Accessory Muscle Usage: Expansion/Accessory Muscles/Retractions: no retractions, no use of accessory muscles  All Lung Field Breath Sounds: All Lung Fields Breath Sounds: Anterior:, Posterior:, clear, equal bilaterally  JOSE Breath Sounds: clear  LLL Breath Sounds: clear, diminished  RUL Breath Sounds: clear  RML Breath Sounds: clear, diminished  RLL Breath Sounds: clear, diminished  O2 Device/Concentration: trach collar 5L/28%  Surgical airway: Yes, Type: Shiley Size: 8, cuffed  Ambu at bedside:       Active Orders   Respiratory Care    Inhalation Treatment Q4H PRN     Frequency: Q4H PRN     Number of Occurrences: Until Specified    Oxygen Continuous     " Frequency: Continuous     Number of Occurrences: Until Specified     Order Questions:      Device type: Low flow      Device: Trach Collar      FiO2%: 40      Titrate O2 per Oxygen Titration Protocol: Yes      To maintain SpO2 goal of: >= 90%      Notify MD of: Inability to achieve desired SpO2; Sudden change in patient status and requires 20% increase in FiO2; Patient requires >60% FiO2    Pulse Oximetry Continuous     Frequency: Continuous     Number of Occurrences: Until Specified    Routine tracheostomy care     Frequency: BID     Number of Occurrences: Until Specified    SUCTION Q4H     Frequency: Q4H     Number of Occurrences: Until Specified       RECOMMENDATIONS    We recommend: RRT Recs: Continue POC per primary team.      FOLLOW-UP    Please call back the Rapid Response RT, Anshul Wiseman RRT at x [phone] for any questions or concerns.

## 2024-03-02 NOTE — PROGRESS NOTES
[facility] - [facility]  General Surgery  Progress Note    Subjective:     History of Present Illness:  53 year old morbidly obese female who does not routinely go to the doctor presents to the ED with malaise, nausea, vomiting, and groin, buttock, perineal swelling and tenderness. She began feeling ill a few days ago.     On arrival to the ED she is tachycardic to 120, hypertensive without fever. Labs demonstrate leukocytosis of 21, , with lactic acidosis and associated ALVARADO with cr 3.8, hyperglycemia with blood glucose of 824 accompanied by severe electrolyte derangements. CT imaging obtained demonstrates diffuse subcutaneous air along buttocks, perineum, anterior vulva, as well as bilateral thighs.     No prior abdominal surgeries. She denies problems with her heart or lungs. Non smoker. Does not routinely take any medications.    Post-Op Info:  Procedure(s) (LRB):  CREATION, TRACHEOSTOMY (N/A)  EGD, WITH PEG TUBE INSERTION (N/A)  CLOSURE, WOUND (Right)  REPLACEMENT, WOUND VAC (Right)   9 Days Post-Op     Interval History: Febrile to 100.4 overnight. Other VSS. WBC and Hg stable from yesterday. Wound vac with good suction.     Medications:  Continuous Infusions:  Scheduled Meds:   sodium chloride 0.9%   Intravenous Once    amLODIPine  10 mg Per G Tube Daily    carvediloL  25 mg Per G Tube BID    heparin (porcine)  7,500 Units Subcutaneous Q8H    insulin aspart U-100  6 Units Subcutaneous Q4H    insulin detemir U-100  14 Units Subcutaneous Daily    psyllium husk (aspartame)  1 packet Per G Tube Daily    sevelamer carbonate  1.6 g Per G Tube TID     PRN Meds:sodium chloride 0.9%, acetaminophen, albuterol-ipratropium, dextrose 10%, dextrose 10%, glucagon (human recombinant), glucose, glucose, hydrALAZINE, insulin aspart U-100, labetalol, naloxone, ondansetron, prochlorperazine, sodium chloride 0.9%, sodium chloride 0.9%     Review of patient's allergies indicates:  No Known Allergies  Objective:     Vital Signs (Most  Recent):  Temp: 98.2 °F (36.8 °C) (03/02/24 0820)  Pulse: 96 (03/02/24 0820)  Resp: 16 (03/02/24 0820)  BP: (!) 150/85 (03/02/24 0820)  SpO2: 99 % (03/02/24 0820) Vital Signs (24h Range):  Temp:  [97.9 °F (36.6 °C)-100.4 °F (38 °C)] 98.2 °F (36.8 °C)  Pulse:  [90-99] 96  Resp:  [16-19] 16  SpO2:  [92 %-100 %] 99 %  BP: (142-161)/(75-85) 150/85     Weight: (!) 140.2 kg (309 lb)  Body mass index is 51.42 kg/m².    Intake/Output - Last 3 Shifts         02/29 0700  03/01 0659 03/01 0700  03/02 0659 03/02 0700  03/03 0659    P.O.  0     I.V. (mL/kg) 1 (0)      NG/ 548     IV Piggyback 250      Total Intake(mL/kg) 917 (6.5) 548 (3.9)     Urine (mL/kg/hr) 825 (0.2) 1100 (0.3)     Drains 0 0     Other 25 35     Stool 5 2     Total Output 855 1137     Net +62 -589            Urine Occurrence 2 x      Stool Occurrence 4 x 2 x              Physical Exam  Vitals and nursing note reviewed.   Constitutional:       General: She is not in acute distress.     Appearance: She is ill-appearing.   HENT:      Head: Normocephalic and atraumatic.   Eyes:      Comments: Not opening eyes to command    Neck:      Comments: Left IJ Trialysis catheter  Cardiovascular:      Rate and Rhythm: Normal rate and regular rhythm.   Pulmonary:      Effort: Pulmonary effort is normal.      Comments: trached    Abdominal:      General: There is no distension.      Palpations: Abdomen is soft.      Tenderness: There is no abdominal tenderness.      Comments: PEG in place   Genitourinary:     Comments: Glynn in place    Skin:     General: Skin is warm and dry.      Comments: Wound vac in place to R thigh/groin to suction  Re approximated skin clean dry and intact   Neurological:      Comments: Occasionally withdraws from painful stimuli. No significant purposeful activity           Significant Labs:  I have reviewed all pertinent lab results within the past 24 hours.    Significant Diagnostics:  I have reviewed all pertinent imaging results/findings  within the past 24 hours.  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 Unfortunately, has multiple infarcts on MRI brain with unchanged neuro status, however, family would like to proceed with all measures. S/p trach, peg, and lower wound closure, replacement of wound vac in right groin on 2/22/24.     - Wound vac changed 2/29 with wound care  - HD held today per nephro - continue lasix gtt at 5cc/h    - s/p IR placement of HD line 2/29    - discontinue LIJ CVL  - Continue TF   - LP 2/16 - NGTD  - ID consulted for urine cultures   - UA growing Burkholderia species and Chryseibacterium Indologenes,  IV zosyn completed 2/22  - Palliative following, full code  - Stable on trach collar  - Okay for DVT ppx   - Remainder of care per SICU    Dispo: LTAC placement once line in place        Jero López DO  General Surgery  [facility]

## 2024-03-02 NOTE — SUBJECTIVE & OBJECTIVE
"Interval HPI:   Overnight events: Remains in [facility]. POD 9. BG reasonably controlled on current SQ insulin regimen. Creatinine 2.9. Diet NPO    Eating:   NPO  Nausea: No  Hypoglycemia and intervention: No  Fever: No  TPN and/or TF: Yes  If yes, type of TF/TPN and rate: TFs at 50 ml/hr     BP (!) 153/83 (BP Location: Right arm, Patient Position: Lying)   Pulse 90   Temp 99.9 °F (37.7 °C) (Axillary)   Resp 16   Ht 5' 5" (1.651 m)   Wt (!) 140.2 kg (309 lb)   LMP 01/01/2024 (Approximate) Comment: tubal ligation  SpO2 95%   BMI 51.42 kg/m²     Labs Reviewed and Include    Recent Labs   Lab 03/02/24  0456   *   CALCIUM 9.6   ALBUMIN 2.6*   PROT 8.7*   *   K 5.1   CO2 21*   CL 98   BUN 99*   CREATININE 3.9*   ALKPHOS 165*   ALT 30   AST 40   BILITOT 0.2     Lab Results   Component Value Date    WBC 10.95 03/02/2024    HGB 8.7 (L) 03/02/2024    HCT 28.0 (L) 03/02/2024    MCV 89 03/02/2024     (H) 03/02/2024     No results for input(s): "TSH", "FREET4" in the last 168 hours.  Lab Results   Component Value Date    HGBA1C 10.4 (H) 01/30/2024       Nutritional status:   Body mass index is 51.42 kg/m².  Lab Results   Component Value Date    ALBUMIN 2.6 (L) 03/02/2024    ALBUMIN 2.6 (L) 03/01/2024    ALBUMIN 2.4 (L) 02/29/2024     No results found for: "PREALBUMIN"    Estimated Creatinine Clearance: 23.8 mL/min (A) (based on SCr of 3.9 mg/dL (H)).    Accu-Checks  Recent Labs     02/29/24  1742 02/29/24 2013 03/01/24  0020 03/01/24  0436 03/01/24  0726 03/01/24  1114 03/01/24  1642 03/01/24  2027 03/01/24  2335 03/02/24  0419   POCTGLUCOSE 168* 172* 175* 168* 164* 211* 172* 172* 155* 171*       Current Medications and/or Treatments Impacting Glycemic Control  Immunotherapy:    Immunosuppressants       None          Steroids:   Hormones (From admission, onward)      None          Pressors:    Autonomic Drugs (From admission, onward)      None          Hyperglycemia/Diabetes Medications: "   Antihyperglycemics (From admission, onward)      Start     Stop Route Frequency Ordered    02/27/24 1354  insulin aspart U-100 pen 0-10 Units         -- SubQ Every 4 hours PRN 02/27/24 1255    02/13/24 0915  insulin detemir U-100 (Levemir) pen 14 Units         -- SubQ Daily 02/13/24 0906    02/09/24 1200  insulin aspart U-100 pen 6 Units         -- SubQ Every 4 hours 02/09/24 0901

## 2024-03-02 NOTE — PROGRESS NOTES
[facility] - [facility]  Nephrology  Progress Note    Patient Name: Sarah Saravia  MRN: [id number]  Admission Date: 1/29/2024  Hospital Length of Stay: 33 days  Attending Provider: Steve Cole MD   Primary Care Physician: Patricia [facility]  Principal Problem:Ayaz's gangrene    Subjective:     HPI: Sarah Saravia is a 53 year old female with a past medical history of obesity (BMI 53) admitted with N/V and R groin wound found to be in DKA at OSH.  She underwent a CT abdomen and pelvis that showed extensive soft tissue air throughout the right groin and inguinal region and extending to involve the right lower quadrant anterior abdominal wall with extensive soft tissue air also seen involving the proximal medial aspect of the right thigh, concerning for gas-forming infection. She is s/p OR debridement for necrotizing fascitis on 1/29/24 and take back for 1/31/24. Right groin tissue growing proteus, susceptibilities still pending. Currently on meropenem and clindamycin which was d/c'd on 1/31/24. While at OSH pt developed acute respiratory failure requiring intubation. Nephrology was consulted for worsening alvarado in the setting of lactic acidosis and septic shock likely ATN, sCr elevated at 3.8 on admit.  OR today for debridement with possible closure and wound vac placement. Last HD 2/1. Net -1.3L. Electrolytes stable. Nephrology consulted for ALVARADO.     Interval History: no acute events overnight, though minor fever to 100.4. UOP 1125,    Review of patient's allergies indicates:  No Known Allergies  Current Facility-Administered Medications   Medication Frequency    0.9%  NaCl infusion PRN    0.9%  NaCl infusion Once    acetaminophen tablet 650 mg Q4H PRN    albuterol-ipratropium 2.5 mg-0.5 mg/3 mL nebulizer solution 3 mL Q4H PRN    amLODIPine tablet 10 mg Daily    carvediloL tablet 25 mg BID    dextrose 10% bolus 125 mL 125 mL PRN    dextrose 10% bolus 250 mL 250 mL PRN    glucagon (human  recombinant) injection 1 mg PRN    glucose chewable tablet 16 g PRN    glucose chewable tablet 24 g PRN    heparin (porcine) injection 7,500 Units Q8H    hydrALAZINE tablet 25 mg Q8H PRN    insulin aspart U-100 pen 0-10 Units Q4H PRN    insulin aspart U-100 pen 6 Units Q4H    insulin detemir U-100 (Levemir) pen 14 Units Daily    labetalol 20 mg/4 mL (5 mg/mL) IV syring Q6H PRN    naloxone 0.4 mg/mL injection 0.02 mg PRN    ondansetron injection 4 mg Q6H PRN    prochlorperazine injection Soln 5 mg Q6H PRN    psyllium husk (aspartame) 3.4 gram packet 1 packet Daily    sevelamer carbonate pwpk 1.6 g TID    sodium chloride 0.9% bolus 250 mL 250 mL PRN    sodium chloride 0.9% flush 10 mL PRN       Objective:     Vital Signs (Most Recent):  Temp: 98.2 °F (36.8 °C) (03/02/24 0820)  Pulse: 96 (03/02/24 0820)  Resp: 16 (03/02/24 0820)  BP: (!) 150/85 (03/02/24 0820)  SpO2: 99 % (03/02/24 0820) Vital Signs (24h Range):  Temp:  [97.9 °F (36.6 °C)-100.4 °F (38 °C)] 98.2 °F (36.8 °C)  Pulse:  [90-99] 96  Resp:  [16-18] 16  SpO2:  [92 %-100 %] 99 %  BP: (142-161)/(75-85) 150/85     Weight: (!) 140.2 kg (309 lb) (02/29/24 0300)  Body mass index is 51.42 kg/m².  Body surface area is 2.54 meters squared.    I/O last 3 completed shifts:  In: 1214 [NG/GT:1214]  Out: 1892 [Urine:1825; Other:60; Stool:7]     Physical Exam  Vitals and nursing note reviewed.   Constitutional:       General: She is not in acute distress.     Appearance: She is ill-appearing.   HENT:      Head: Normocephalic and atraumatic.   Eyes:      Comments: Not opening eyes to command    Neck:      Comments: Left IJ Trialysis catheter  Cardiovascular:      Rate and Rhythm: Normal rate and regular rhythm.   Pulmonary:      Effort: Pulmonary effort is normal.      Comments: trached    Abdominal:      General: There is no distension.      Palpations: Abdomen is soft.      Tenderness: There is no abdominal tenderness.      Comments: PEG in place   Genitourinary:      Comments: Glynn in place    Skin:     General: Skin is warm and dry.      Comments: Wound vac in place to R thigh/groin to suction  Re approximated skin clean dry and intact   Neurological:      Comments: Occasionally withdraws from painful stimuli. No significant purposeful activity           Significant Labs:  All labs within the past 24 hours have been reviewed.     Significant Imaging:  Labs: Reviewed  Assessment/Plan:     Renal/  ALVARADO (acute kidney injury)  Transfer from [facility]. Admitted for fourniers gangrene. Initiated HD on 1/31. ALVARADO 2/2 ATN in setting of septic shock. Arrived with CR 3.8. Unknown baseline. S/P OR debridement with possible closure and wound vac placement     ALVARADO most likley ATN in setting of septic shock; ATN recovering.   UOP:     Intake/Output Summary (Last 24 hours) at 3/2/2024 0907  Last data filed at 3/2/2024 0534  Gross per 24 hour   Intake 548 ml   Output 1137 ml   Net -589 ml         Plan/Recommendation  - No urgent need for RRT, will evaluate need on daily basis.   - Would HOLD on perm cath placement for now as patient with some signs of renal recovery  - need RFP daily to watch for renal recovery  - Please dose all ABX in accordance to RRT schedule (zosyn, etc)  -Daily RFP   -Strict I&Os  -Avoid nephrotoxins, if possible         Thank you for your consult. I will follow-up with patient. Please contact us if you have any additional questions.    Enrique Stokes MD  Nephrology  [facility]

## 2024-03-02 NOTE — SUBJECTIVE & OBJECTIVE
Interval History: no acute events overnight, though minor fever to 100.4. UOP 1125,    Review of patient's allergies indicates:  No Known Allergies  Current Facility-Administered Medications   Medication Frequency    0.9%  NaCl infusion PRN    0.9%  NaCl infusion Once    acetaminophen tablet 650 mg Q4H PRN    albuterol-ipratropium 2.5 mg-0.5 mg/3 mL nebulizer solution 3 mL Q4H PRN    amLODIPine tablet 10 mg Daily    carvediloL tablet 25 mg BID    dextrose 10% bolus 125 mL 125 mL PRN    dextrose 10% bolus 250 mL 250 mL PRN    glucagon (human recombinant) injection 1 mg PRN    glucose chewable tablet 16 g PRN    glucose chewable tablet 24 g PRN    heparin (porcine) injection 7,500 Units Q8H    hydrALAZINE tablet 25 mg Q8H PRN    insulin aspart U-100 pen 0-10 Units Q4H PRN    insulin aspart U-100 pen 6 Units Q4H    insulin detemir U-100 (Levemir) pen 14 Units Daily    labetalol 20 mg/4 mL (5 mg/mL) IV syring Q6H PRN    naloxone 0.4 mg/mL injection 0.02 mg PRN    ondansetron injection 4 mg Q6H PRN    prochlorperazine injection Soln 5 mg Q6H PRN    psyllium husk (aspartame) 3.4 gram packet 1 packet Daily    sevelamer carbonate pwpk 1.6 g TID    sodium chloride 0.9% bolus 250 mL 250 mL PRN    sodium chloride 0.9% flush 10 mL PRN       Objective:     Vital Signs (Most Recent):  Temp: 98.2 °F (36.8 °C) (03/02/24 0820)  Pulse: 96 (03/02/24 0820)  Resp: 16 (03/02/24 0820)  BP: (!) 150/85 (03/02/24 0820)  SpO2: 99 % (03/02/24 0820) Vital Signs (24h Range):  Temp:  [97.9 °F (36.6 °C)-100.4 °F (38 °C)] 98.2 °F (36.8 °C)  Pulse:  [90-99] 96  Resp:  [16-18] 16  SpO2:  [92 %-100 %] 99 %  BP: (142-161)/(75-85) 150/85     Weight: (!) 140.2 kg (309 lb) (02/29/24 0300)  Body mass index is 51.42 kg/m².  Body surface area is 2.54 meters squared.    I/O last 3 completed shifts:  In: 1214 [NG/GT:1214]  Out: 1892 [Urine:1825; Other:60; Stool:7]     Physical Exam  Vitals and nursing note reviewed.   Constitutional:       General: She is not  in acute distress.     Appearance: She is ill-appearing.   HENT:      Head: Normocephalic and atraumatic.   Eyes:      Comments: Not opening eyes to command    Neck:      Comments: Left IJ Trialysis catheter  Cardiovascular:      Rate and Rhythm: Normal rate and regular rhythm.   Pulmonary:      Effort: Pulmonary effort is normal.      Comments: trached    Abdominal:      General: There is no distension.      Palpations: Abdomen is soft.      Tenderness: There is no abdominal tenderness.      Comments: PEG in place   Genitourinary:     Comments: Glynn in place    Skin:     General: Skin is warm and dry.      Comments: Wound vac in place to R thigh/groin to suction  Re approximated skin clean dry and intact   Neurological:      Comments: Occasionally withdraws from painful stimuli. No significant purposeful activity           Significant Labs:  All labs within the past 24 hours have been reviewed.     Significant Imaging:  Labs: Reviewed

## 2024-03-02 NOTE — ASSESSMENT & PLAN NOTE
Lab Results   Component Value Date    CREATININE 3.9 (H) 03/02/2024     Avoid insulin stacking  Titrate insulin slowly

## 2024-03-03 LAB
ALBUMIN SERPL BCP-MCNC: 2.7 G/DL (ref 3.5–5.2)
ALP SERPL-CCNC: 160 U/L (ref 55–135)
ALT SERPL W/O P-5'-P-CCNC: 28 U/L (ref 10–44)
ANION GAP SERPL CALC-SCNC: 15 MMOL/L (ref 8–16)
AST SERPL-CCNC: 38 U/L (ref 10–40)
BASOPHILS # BLD AUTO: 0.05 K/UL (ref 0–0.2)
BASOPHILS NFR BLD: 0.5 % (ref 0–1.9)
BILIRUB SERPL-MCNC: 0.2 MG/DL (ref 0.1–1)
BUN SERPL-MCNC: 104 MG/DL (ref 6–20)
CALCIUM SERPL-MCNC: 10 MG/DL (ref 8.7–10.5)
CHLORIDE SERPL-SCNC: 100 MMOL/L (ref 95–110)
CO2 SERPL-SCNC: 19 MMOL/L (ref 23–29)
CREAT SERPL-MCNC: 3.3 MG/DL (ref 0.5–1.4)
DIFFERENTIAL METHOD BLD: ABNORMAL
EOSINOPHIL # BLD AUTO: 0.6 K/UL (ref 0–0.5)
EOSINOPHIL NFR BLD: 6 % (ref 0–8)
ERYTHROCYTE [DISTWIDTH] IN BLOOD BY AUTOMATED COUNT: 16.7 % (ref 11.5–14.5)
EST. GFR  (NO RACE VARIABLE): 16.1 ML/MIN/1.73 M^2
GLUCOSE SERPL-MCNC: 172 MG/DL (ref 70–110)
HCT VFR BLD AUTO: 27 % (ref 37–48.5)
HGB BLD-MCNC: 8.4 G/DL (ref 12–16)
IMM GRANULOCYTES # BLD AUTO: 0.05 K/UL (ref 0–0.04)
IMM GRANULOCYTES NFR BLD AUTO: 0.5 % (ref 0–0.5)
LYMPHOCYTES # BLD AUTO: 2 K/UL (ref 1–4.8)
LYMPHOCYTES NFR BLD: 20.6 % (ref 18–48)
MAGNESIUM SERPL-MCNC: 2.1 MG/DL (ref 1.6–2.6)
MCH RBC QN AUTO: 27.6 PG (ref 27–31)
MCHC RBC AUTO-ENTMCNC: 31.1 G/DL (ref 32–36)
MCV RBC AUTO: 89 FL (ref 82–98)
MONOCYTES # BLD AUTO: 1 K/UL (ref 0.3–1)
MONOCYTES NFR BLD: 10.4 % (ref 4–15)
NEUTROPHILS # BLD AUTO: 6.1 K/UL (ref 1.8–7.7)
NEUTROPHILS NFR BLD: 62 % (ref 38–73)
NRBC BLD-RTO: 0 /100 WBC
PHOSPHATE SERPL-MCNC: 5.8 MG/DL (ref 2.7–4.5)
PLATELET # BLD AUTO: 558 K/UL (ref 150–450)
PMV BLD AUTO: 10.3 FL (ref 9.2–12.9)
POCT GLUCOSE: 169 MG/DL (ref 70–110)
POCT GLUCOSE: 185 MG/DL (ref 70–110)
POCT GLUCOSE: 191 MG/DL (ref 70–110)
POCT GLUCOSE: 197 MG/DL (ref 70–110)
POCT GLUCOSE: 199 MG/DL (ref 70–110)
POCT GLUCOSE: 202 MG/DL (ref 70–110)
POCT GLUCOSE: 208 MG/DL (ref 70–110)
POTASSIUM SERPL-SCNC: 4.9 MMOL/L (ref 3.5–5.1)
PROT SERPL-MCNC: 8.7 G/DL (ref 6–8.4)
RBC # BLD AUTO: 3.04 M/UL (ref 4–5.4)
SODIUM SERPL-SCNC: 134 MMOL/L (ref 136–145)
WBC # BLD AUTO: 9.77 K/UL (ref 3.9–12.7)

## 2024-03-03 PROCEDURE — 83735 ASSAY OF MAGNESIUM: CPT

## 2024-03-03 PROCEDURE — 80053 COMPREHEN METABOLIC PANEL: CPT

## 2024-03-03 PROCEDURE — 85025 COMPLETE CBC W/AUTO DIFF WBC: CPT

## 2024-03-03 PROCEDURE — 27000221 HC OXYGEN, UP TO 24 HOURS

## 2024-03-03 PROCEDURE — 99900026 HC AIRWAY MAINTENANCE (STAT)

## 2024-03-03 PROCEDURE — 27000207 HC ISOLATION

## 2024-03-03 PROCEDURE — 99900035 HC TECH TIME PER 15 MIN (STAT)

## 2024-03-03 PROCEDURE — 99233 SBSQ HOSP IP/OBS HIGH 50: CPT | Mod: ,,, | Performed by: INTERNAL MEDICINE

## 2024-03-03 PROCEDURE — 25000242 PHARM REV CODE 250 ALT 637 W/ HCPCS

## 2024-03-03 PROCEDURE — 99232 SBSQ HOSP IP/OBS MODERATE 35: CPT | Mod: ,,, | Performed by: NURSE PRACTITIONER

## 2024-03-03 PROCEDURE — 25000003 PHARM REV CODE 250

## 2024-03-03 PROCEDURE — 20600001 HC STEP DOWN PRIVATE ROOM

## 2024-03-03 PROCEDURE — 84100 ASSAY OF PHOSPHORUS: CPT

## 2024-03-03 PROCEDURE — 36415 COLL VENOUS BLD VENIPUNCTURE: CPT

## 2024-03-03 PROCEDURE — 94761 N-INVAS EAR/PLS OXIMETRY MLT: CPT

## 2024-03-03 PROCEDURE — 63600175 PHARM REV CODE 636 W HCPCS

## 2024-03-03 RX ORDER — INSULIN ASPART 100 [IU]/ML
7 INJECTION, SOLUTION INTRAVENOUS; SUBCUTANEOUS EVERY 4 HOURS
Status: DISCONTINUED | OUTPATIENT
Start: 2024-03-03 | End: 2024-03-04

## 2024-03-03 RX ADMIN — INSULIN ASPART 7 UNITS: 100 INJECTION, SOLUTION INTRAVENOUS; SUBCUTANEOUS at 12:03

## 2024-03-03 RX ADMIN — INSULIN ASPART 7 UNITS: 100 INJECTION, SOLUTION INTRAVENOUS; SUBCUTANEOUS at 05:03

## 2024-03-03 RX ADMIN — HEPARIN SODIUM 7500 UNITS: 5000 INJECTION INTRAVENOUS; SUBCUTANEOUS at 10:03

## 2024-03-03 RX ADMIN — AMLODIPINE BESYLATE 10 MG: 10 TABLET ORAL at 08:03

## 2024-03-03 RX ADMIN — PSYLLIUM HUSK 1 PACKET: 3.4 POWDER ORAL at 08:03

## 2024-03-03 RX ADMIN — INSULIN ASPART 2 UNITS: 100 INJECTION, SOLUTION INTRAVENOUS; SUBCUTANEOUS at 08:03

## 2024-03-03 RX ADMIN — INSULIN ASPART 1 UNITS: 100 INJECTION, SOLUTION INTRAVENOUS; SUBCUTANEOUS at 08:03

## 2024-03-03 RX ADMIN — SEVELAMER CARBONATE 1.6 G: 800 POWDER, FOR SUSPENSION ORAL at 09:03

## 2024-03-03 RX ADMIN — INSULIN ASPART 6 UNITS: 100 INJECTION, SOLUTION INTRAVENOUS; SUBCUTANEOUS at 12:03

## 2024-03-03 RX ADMIN — HEPARIN SODIUM 7500 UNITS: 5000 INJECTION INTRAVENOUS; SUBCUTANEOUS at 02:03

## 2024-03-03 RX ADMIN — INSULIN ASPART 7 UNITS: 100 INJECTION, SOLUTION INTRAVENOUS; SUBCUTANEOUS at 08:03

## 2024-03-03 RX ADMIN — INSULIN DETEMIR 14 UNITS: 100 INJECTION, SOLUTION SUBCUTANEOUS at 08:03

## 2024-03-03 RX ADMIN — INSULIN ASPART 6 UNITS: 100 INJECTION, SOLUTION INTRAVENOUS; SUBCUTANEOUS at 05:03

## 2024-03-03 RX ADMIN — INSULIN ASPART 1 UNITS: 100 INJECTION, SOLUTION INTRAVENOUS; SUBCUTANEOUS at 12:03

## 2024-03-03 RX ADMIN — HEPARIN SODIUM 7500 UNITS: 5000 INJECTION INTRAVENOUS; SUBCUTANEOUS at 06:03

## 2024-03-03 RX ADMIN — INSULIN ASPART 4 UNITS: 100 INJECTION, SOLUTION INTRAVENOUS; SUBCUTANEOUS at 05:03

## 2024-03-03 RX ADMIN — INSULIN ASPART 1 UNITS: 100 INJECTION, SOLUTION INTRAVENOUS; SUBCUTANEOUS at 05:03

## 2024-03-03 RX ADMIN — SEVELAMER CARBONATE 1.6 G: 800 POWDER, FOR SUSPENSION ORAL at 02:03

## 2024-03-03 RX ADMIN — SEVELAMER CARBONATE 1.6 G: 800 POWDER, FOR SUSPENSION ORAL at 08:03

## 2024-03-03 RX ADMIN — INSULIN ASPART 4 UNITS: 100 INJECTION, SOLUTION INTRAVENOUS; SUBCUTANEOUS at 12:03

## 2024-03-03 RX ADMIN — CARVEDILOL 25 MG: 25 TABLET, FILM COATED ORAL at 08:03

## 2024-03-03 NOTE — SUBJECTIVE & OBJECTIVE
"Interval HPI:   Overnight events: Remains in GISSU. POD 10. BG at higher end of goal ranges on current SQ insulin regimen. Creatinine 3.3. Diet NPO    Eating:   NPO  Nausea: No  Hypoglycemia and intervention: No  Fever: No  TPN and/or TF: Yes  If yes, type of TF/TPN and rate: TFs at 50 ml/hr     /79 (BP Location: Right arm, Patient Position: Lying)   Pulse 94   Temp 99.1 °F (37.3 °C) (Oral)   Resp 18   Ht 5' 5" (1.651 m)   Wt (!) 140.2 kg (309 lb)   LMP 01/01/2024 (Approximate) Comment: tubal ligation  SpO2 97%   BMI 51.42 kg/m²     Labs Reviewed and Include    Recent Labs   Lab 03/03/24 0419   *   CALCIUM 10.0   ALBUMIN 2.7*   PROT 8.7*   *   K 4.9   CO2 19*      *   CREATININE 3.3*   ALKPHOS 160*   ALT 28   AST 38   BILITOT 0.2     Lab Results   Component Value Date    WBC 9.77 03/03/2024    HGB 8.4 (L) 03/03/2024    HCT 27.0 (L) 03/03/2024    MCV 89 03/03/2024     (H) 03/03/2024     No results for input(s): "TSH", "FREET4" in the last 168 hours.  Lab Results   Component Value Date    HGBA1C 10.4 (H) 01/30/2024       Nutritional status:   Body mass index is 51.42 kg/m².  Lab Results   Component Value Date    ALBUMIN 2.7 (L) 03/03/2024    ALBUMIN 2.6 (L) 03/02/2024    ALBUMIN 2.6 (L) 03/01/2024     No results found for: "PREALBUMIN"    Estimated Creatinine Clearance: 28.1 mL/min (A) (based on SCr of 3.3 mg/dL (H)).    Accu-Checks  Recent Labs     03/01/24 2027 03/01/24  2335 03/02/24  0419 03/02/24  0818 03/02/24  1238 03/02/24  1656 03/02/24  2114 03/02/24  2344 03/03/24  0055 03/03/24  0501   POCTGLUCOSE 172* 155* 171* 181* 211* 189* 165* 169* 185* 191*       Current Medications and/or Treatments Impacting Glycemic Control  Immunotherapy:    Immunosuppressants       None          Steroids:   Hormones (From admission, onward)      None          Pressors:    Autonomic Drugs (From admission, onward)      None          Hyperglycemia/Diabetes Medications: "   Antihyperglycemics (From admission, onward)      Start     Stop Route Frequency Ordered    03/03/24 1200  insulin aspart U-100 pen 7 Units         -- SubQ Every 4 hours 03/03/24 0835    02/27/24 1354  insulin aspart U-100 pen 0-10 Units         -- SubQ Every 4 hours PRN 02/27/24 1255    02/13/24 0915  insulin detemir U-100 (Levemir) pen 14 Units         -- SubQ Daily 02/13/24 0906

## 2024-03-03 NOTE — NURSING
.[facility] Plan of Care Note    Dx : Ayaz's Ganggrene    Shift Events: None    Goals of Care: Maintain patent airway; blood glucose monitoring;     Neuro: Unable to assess    Vital Signs:  WDL    Respiratory: 5L 28% trach collar    Diet: NPO ; TF peptamen intense    Is patient tolerating current diet? Yes    GTTS: N/A    Urine Output/Bowel Movement:  adequate    Drains/Tubes/Tube Feeds (include total output/shift): penrose drain x, R groin wound vac, PEG LUQ; FC    Lines: PIVx1      Accuchecks: q4 coverage given.    Skin: R groin sutures with wound vac and penrose drain    Fall Risk Score: 15    Activity level? Complete assist    Any scheduled procedures?  None    Any safety concerns? Aspiration Prec amd falls    Other: N/A

## 2024-03-03 NOTE — PLAN OF CARE
[facility] Plan of Care Note  Dx Final diagnoses:  [R00.0] Tachycardia  [E13.10] Diabetic ketoacidosis without coma associated with other specified diabetes mellitus (Primary)  [N49.3] Ayaz's gangrene  [N17.9] ALVARADO (acute kidney injury)      Shift Events no significant events. Moving bilateral upper extremities, but not following commands. No complaints/signs of pain     Neuro: does not follow commands, no changes from previous shift     Vital Signs: VSS    Respiratory: trach collar 5L 28%    Diet: NPO- tube feeds     Urine Output/Bowel Movement: meza/ FMS             Problem: Adult Inpatient Plan of Care  Goal: Plan of Care Review  Outcome: Ongoing, Progressing  Goal: Patient-Specific Goal (Individualized)  Outcome: Ongoing, Progressing  Goal: Absence of Hospital-Acquired Illness or Injury  Outcome: Ongoing, Progressing  Goal: Optimal Comfort and Wellbeing  Outcome: Ongoing, Progressing  Goal: Readiness for Transition of Care  Outcome: Ongoing, Progressing     Problem: Bariatric Environmental Safety  Goal: Safety Maintained with Care  Outcome: Ongoing, Progressing     Problem: Diabetes Comorbidity  Goal: Blood Glucose Level Within Targeted Range  Outcome: Ongoing, Progressing     Problem: Adjustment to Illness (Sepsis/Septic Shock)  Goal: Optimal Coping  Outcome: Ongoing, Progressing     Problem: Bleeding (Sepsis/Septic Shock)  Goal: Absence of Bleeding  Outcome: Ongoing, Progressing     Problem: Glycemic Control Impaired (Sepsis/Septic Shock)  Goal: Blood Glucose Level Within Desired Range  Outcome: Ongoing, Progressing     Problem: Infection Progression (Sepsis/Septic Shock)  Goal: Absence of Infection Signs and Symptoms  Outcome: Ongoing, Progressing     Problem: Nutrition Impaired (Sepsis/Septic Shock)  Goal: Optimal Nutrition Intake  Outcome: Ongoing, Progressing     Problem: Diabetic Ketoacidosis  Goal: Fluid and Electrolyte Balance with Absence of Ketosis  Outcome: Ongoing, Progressing     Problem: Fluid and  Electrolyte Imbalance (Acute Kidney Injury/Impairment)  Goal: Fluid and Electrolyte Balance  Outcome: Ongoing, Progressing     Problem: Oral Intake Inadequate (Acute Kidney Injury/Impairment)  Goal: Optimal Nutrition Intake  Outcome: Ongoing, Progressing     Problem: Renal Function Impairment (Acute Kidney Injury/Impairment)  Goal: Effective Renal Function  Outcome: Ongoing, Progressing     Problem: Infection  Goal: Absence of Infection Signs and Symptoms  Outcome: Ongoing, Progressing     Problem: Device-Related Complication Risk (Hemodialysis)  Goal: Safe, Effective Therapy Delivery  Outcome: Ongoing, Progressing     Problem: Hemodynamic Instability (Hemodialysis)  Goal: Effective Tissue Perfusion  Outcome: Ongoing, Progressing     Problem: Infection (Hemodialysis)  Goal: Absence of Infection Signs and Symptoms  Outcome: Ongoing, Progressing     Problem: Fall Injury Risk  Goal: Absence of Fall and Fall-Related Injury  Outcome: Ongoing, Progressing     Problem: Device-Related Complication Risk (CRRT (Continuous Renal Replacement Therapy))  Goal: Safe, Effective Therapy Delivery  Outcome: Ongoing, Progressing     Problem: Hypothermia (CRRT (Continuous Renal Replacement Therapy))  Goal: Body Temperature Maintained in Desired Range  Outcome: Ongoing, Progressing     Problem: Infection (CRRT (Continuous Renal Replacement Therapy))  Goal: Absence of Infection Signs and Symptoms  Outcome: Ongoing, Progressing     Problem: Communication Impairment (Mechanical Ventilation, Invasive)  Goal: Effective Communication  Outcome: Ongoing, Progressing     Problem: Device-Related Complication Risk (Mechanical Ventilation, Invasive)  Goal: Optimal Device Function  Outcome: Ongoing, Progressing     Problem: Inability to Wean (Mechanical Ventilation, Invasive)  Goal: Mechanical Ventilation Liberation  Outcome: Ongoing, Progressing     Problem: Nutrition Impairment (Mechanical Ventilation, Invasive)  Goal: Optimal Nutrition  Delivery  Outcome: Ongoing, Progressing     Problem: Skin and Tissue Injury (Mechanical Ventilation, Invasive)  Goal: Absence of Device-Related Skin and Tissue Injury  Outcome: Ongoing, Progressing     Problem: Ventilator-Induced Lung Injury (Mechanical Ventilation, Invasive)  Goal: Absence of Ventilator-Induced Lung Injury  Outcome: Ongoing, Progressing     Problem: Coping Ineffective  Goal: Effective Coping  Outcome: Ongoing, Progressing

## 2024-03-03 NOTE — SUBJECTIVE & OBJECTIVE
Interval History: creatinine finally improving    Review of patient's allergies indicates:  No Known Allergies  Current Facility-Administered Medications   Medication Frequency    0.9%  NaCl infusion PRN    0.9%  NaCl infusion Once    acetaminophen tablet 650 mg Q4H PRN    albuterol-ipratropium 2.5 mg-0.5 mg/3 mL nebulizer solution 3 mL Q4H PRN    amLODIPine tablet 10 mg Daily    carvediloL tablet 25 mg BID    dextrose 10% bolus 125 mL 125 mL PRN    dextrose 10% bolus 250 mL 250 mL PRN    glucagon (human recombinant) injection 1 mg PRN    glucose chewable tablet 16 g PRN    glucose chewable tablet 24 g PRN    heparin (porcine) injection 7,500 Units Q8H    hydrALAZINE tablet 25 mg Q8H PRN    insulin aspart U-100 pen 0-10 Units Q4H PRN    insulin aspart U-100 pen 7 Units Q4H    insulin detemir U-100 (Levemir) pen 14 Units Daily    labetalol 20 mg/4 mL (5 mg/mL) IV syring Q6H PRN    naloxone 0.4 mg/mL injection 0.02 mg PRN    ondansetron injection 4 mg Q6H PRN    prochlorperazine injection Soln 5 mg Q6H PRN    psyllium husk (aspartame) 3.4 gram packet 1 packet Daily    sevelamer carbonate pwpk 1.6 g TID    sodium chloride 0.9% bolus 250 mL 250 mL PRN    sodium chloride 0.9% flush 10 mL PRN       Objective:     Vital Signs (Most Recent):  Temp: 98.6 °F (37 °C) (03/03/24 0851)  Pulse: 93 (03/03/24 0851)  Resp: 18 (03/03/24 0851)  BP: (!) 151/83 (03/03/24 0851)  SpO2: 99 % (03/03/24 0851) Vital Signs (24h Range):  Temp:  [98 °F (36.7 °C)-99.1 °F (37.3 °C)] 98.6 °F (37 °C)  Pulse:  [83-96] 93  Resp:  [16-18] 18  SpO2:  [94 %-99 %] 99 %  BP: (132-175)/(67-83) 151/83     Weight: (!) 140.2 kg (309 lb) (02/29/24 0300)  Body mass index is 51.42 kg/m².  Body surface area is 2.54 meters squared.    I/O last 3 completed shifts:  In: 1178 [NG/GT:1178]  Out: 2312 [Urine:2100; Other:10; Stool:202]     Physical Exam  Vitals and nursing note reviewed.   Constitutional:       General: She is not in acute distress.     Appearance: She is  ill-appearing.   HENT:      Head: Normocephalic and atraumatic.   Eyes:      Comments: Not opening eyes to command    Neck:      Comments: Left IJ Trialysis catheter  Cardiovascular:      Rate and Rhythm: Normal rate and regular rhythm.   Pulmonary:      Effort: Pulmonary effort is normal.      Comments: trached    Abdominal:      General: There is no distension.      Palpations: Abdomen is soft.      Tenderness: There is no abdominal tenderness.      Comments: PEG in place   Genitourinary:     Comments: Glynn in place    Skin:     General: Skin is warm and dry.      Comments: Wound vac in place to R thigh/groin to suction  Re approximated skin clean dry and intact   Neurological:      Comments: Occasionally withdraws from painful stimuli. No significant purposeful activity           Significant Labs:  All labs within the past 24 hours have been reviewed.     Significant Imaging:  Labs: Reviewed

## 2024-03-03 NOTE — ASSESSMENT & PLAN NOTE
Transfer from [facility]. Admitted for fourniers gangrene. Initiated HD on 1/31. ALVARADO 2/2 ATN in setting of septic shock. Arrived with CR 3.8. Unknown baseline. S/P OR debridement with possible closure and wound vac placement     ALVARADO most likley ATN in setting of septic shock; ATN recovering.   UOP:     Intake/Output Summary (Last 24 hours) at 3/3/2024 1010  Last data filed at 3/3/2024 0612  Gross per 24 hour   Intake 750 ml   Output 1625 ml   Net -875 ml       3/3/2024: serum creatinine improved form 3.9 to 3.3    Plan/Recommendation  - No urgent need for RRT, will evaluate need on daily basis.   - Would HOLD on perm cath placement for now as patient with some signs of renal recovery  - need RFP daily to watch for renal recovery  - Please dose all ABX in accordance to RRT schedule (zosyn, etc)  -Daily RFP   -Strict I&Os  -Avoid nephrotoxins, if possible

## 2024-03-03 NOTE — PROGRESS NOTES
[facility] - [facility]  General Surgery  Progress Note    Subjective:     History of Present Illness:  53 year old morbidly obese female who does not routinely go to the doctor presents to the ED with malaise, nausea, vomiting, and groin, buttock, perineal swelling and tenderness. She began feeling ill a few days ago.     On arrival to the ED she is tachycardic to 120, hypertensive without fever. Labs demonstrate leukocytosis of 21, , with lactic acidosis and associated ALVARADO with cr 3.8, hyperglycemia with blood glucose of 824 accompanied by severe electrolyte derangements. CT imaging obtained demonstrates diffuse subcutaneous air along buttocks, perineum, anterior vulva, as well as bilateral thighs.     No prior abdominal surgeries. She denies problems with her heart or lungs. Non smoker. Does not routinely take any medications.    Post-Op Info:  Procedure(s) (LRB):  CREATION, TRACHEOSTOMY (N/A)  EGD, WITH PEG TUBE INSERTION (N/A)  CLOSURE, WOUND (Right)  REPLACEMENT, WOUND VAC (Right)   10 Days Post-Op     Interval History: No issues overnight. Remains on 5L 28% trach collar. Tolerating TF. WV to right abdominal wound in place, good seal     Medications:  Continuous Infusions:  Scheduled Meds:   sodium chloride 0.9%   Intravenous Once    amLODIPine  10 mg Per G Tube Daily    carvediloL  25 mg Per G Tube BID    heparin (porcine)  7,500 Units Subcutaneous Q8H    insulin aspart U-100  7 Units Subcutaneous Q4H    insulin detemir U-100  14 Units Subcutaneous Daily    psyllium husk (aspartame)  1 packet Per G Tube Daily    sevelamer carbonate  1.6 g Per G Tube TID     PRN Meds:sodium chloride 0.9%, acetaminophen, albuterol-ipratropium, dextrose 10%, dextrose 10%, glucagon (human recombinant), glucose, glucose, hydrALAZINE, insulin aspart U-100, labetalol, naloxone, ondansetron, prochlorperazine, sodium chloride 0.9%, sodium chloride 0.9%     Review of patient's allergies indicates:  No Known Allergies  Objective:      Vital Signs (Most Recent):  Temp: 98.6 °F (37 °C) (03/03/24 0851)  Pulse: 93 (03/03/24 0851)  Resp: 18 (03/03/24 0851)  BP: (!) 151/83 (03/03/24 0851)  SpO2: 99 % (03/03/24 0851) Vital Signs (24h Range):  Temp:  [98 °F (36.7 °C)-99.1 °F (37.3 °C)] 98.6 °F (37 °C)  Pulse:  [83-96] 93  Resp:  [16-18] 18  SpO2:  [94 %-99 %] 99 %  BP: (132-175)/(67-83) 151/83     Weight: (!) 140.2 kg (309 lb)  Body mass index is 51.42 kg/m².    Intake/Output - Last 3 Shifts         03/01 0700  03/02 0659 03/02 0700  03/03 0659 03/03 0700  03/04 0659    P.O. 0      I.V. (mL/kg)       NG/ 750     IV Piggyback       Total Intake(mL/kg) 548 (3.9) 750 (5.3)     Urine (mL/kg/hr) 1100 (0.3) 1425 (0.4)     Drains 0 0     Other 35 0     Stool 2 200     Total Output 1137 1625     Net -589 -875            Stool Occurrence 2 x              Physical Exam  Vitals and nursing note reviewed.   Constitutional:       General: She is not in acute distress.     Appearance: She is ill-appearing.   HENT:      Head: Normocephalic and atraumatic.   Eyes:      Comments: Not opening eyes to command    Cardiovascular:      Rate and Rhythm: Normal rate and regular rhythm.      Comments: Permacath in place, clean/dry  Pulmonary:      Effort: Pulmonary effort is normal.      Comments: trached    Abdominal:      General: There is no distension.      Palpations: Abdomen is soft.      Tenderness: There is no abdominal tenderness.      Comments: PEG in place   Genitourinary:     Comments: Glynn in place    Skin:     General: Skin is warm and dry.      Comments: Wound vac in place to R thigh/groin to suction  Re approximated skin clean dry and intact   Neurological:      Comments: Occasionally withdraws from painful stimuli. No significant purposeful activity           Significant Labs:  I have reviewed all pertinent lab results within the past 24 hours.    Significant Diagnostics:  I have reviewed all pertinent imaging results/findings within the past 24  hours.  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 Unfortunately, has multiple infarcts on MRI brain with unchanged neuro status, however, family would like to proceed with all measures. S/p trach, peg, and lower wound closure, replacement of wound vac in right groin on 2/22/24. IR placement of HD line 2/29     - Wound vac changed 2/29 with wound care  - HD per nephro via permacath. UOP 1425cc/24 hrs  - Continue TF   - endocrinology following: levemir 14u daily/novolog 7 units Q4  - LP 2/16 - NGTD  - ID consulted for urine cultures   - UA growing Burkholderia species and Chryseibacterium Indologenes,  IV zosyn completed 2/22  - Palliative following, full code  - Stable on trach collar  - Okay for DVT ppx       Dispo: LTAC placement, now medically stable for transfer        Sil Philippe MD  General Surgery  [facility]

## 2024-03-03 NOTE — PROGRESS NOTES
"[facility] - [facility]  Endocrinology  Progress Note    Admit Date: 1/29/2024     Reason for Consult: Management of T2DM, Hyperglycemia     Surgical Procedure and Date: n/a    Diabetes diagnosis year: new onset     Home Diabetes Medications:  none per chart review and family present at bedside     Lab Results   Component Value Date    HGBA1C 10.4 (H) 01/30/2024       Diabetes Complications include:     Hyperglycemia, DKA at OSH     Complicating diabetes co morbidities:   Obesity       HPI:   Patient is a 53 y.o. female with a diagnosis of obesity (BMI 53) admitted with N/V and R groin wound found to be in DKA at OSH. 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 While at OSH pt developed acute respiratory failure requiring intubation. Pulmonology was consulted for ventilator management and critical illness. Nephrology was consulted for worsening vishal in the setting of lactic acidosis and septic shock likely ATN, sCr elevated at 3.8 on admit now 4.0 post HD. Pt being admitted to SICU for surgical critical care management. Immediate plans include hemodynamic stabilization, weaning of respiratory support, and RRT.  Endocrinology consulted for management of T2DM.                 Interval HPI:   Overnight events: Remains in [facility]. POD 10. BG at higher end of goal ranges on current SQ insulin regimen. Creatinine 3.3. Diet NPO    Eating:   NPO  Nausea: No  Hypoglycemia and intervention: No  Fever: No  TPN and/or TF: Yes  If yes, type of TF/TPN and rate: TFs at 50 ml/hr     /79 (BP Location: Right arm, Patient Position: Lying)   Pulse 94   Temp 99.1 °F (37.3 °C) (Oral)   Resp 18   Ht 5' 5" (1.651 m)   Wt (!) 140.2 kg (309 lb)   LMP 01/01/2024 (Approximate) Comment: tubal ligation  SpO2 97%   BMI 51.42 kg/m²     Labs Reviewed and Include    Recent Labs   Lab 03/03/24  0419   *   CALCIUM 10.0   ALBUMIN 2.7*   PROT 8.7* " "  *   K 4.9   CO2 19*      *   CREATININE 3.3*   ALKPHOS 160*   ALT 28   AST 38   BILITOT 0.2     Lab Results   Component Value Date    WBC 9.77 03/03/2024    HGB 8.4 (L) 03/03/2024    HCT 27.0 (L) 03/03/2024    MCV 89 03/03/2024     (H) 03/03/2024     No results for input(s): "TSH", "FREET4" in the last 168 hours.  Lab Results   Component Value Date    HGBA1C 10.4 (H) 01/30/2024       Nutritional status:   Body mass index is 51.42 kg/m².  Lab Results   Component Value Date    ALBUMIN 2.7 (L) 03/03/2024    ALBUMIN 2.6 (L) 03/02/2024    ALBUMIN 2.6 (L) 03/01/2024     No results found for: "PREALBUMIN"    Estimated Creatinine Clearance: 28.1 mL/min (A) (based on SCr of 3.3 mg/dL (H)).    Accu-Checks  Recent Labs     03/01/24  2027 03/01/24  2335 03/02/24  0419 03/02/24  0818 03/02/24  1238 03/02/24  1656 03/02/24  2114 03/02/24  2344 03/03/24  0055 03/03/24  0501   POCTGLUCOSE 172* 155* 171* 181* 211* 189* 165* 169* 185* 191*       Current Medications and/or Treatments Impacting Glycemic Control  Immunotherapy:    Immunosuppressants       None          Steroids:   Hormones (From admission, onward)      None          Pressors:    Autonomic Drugs (From admission, onward)      None          Hyperglycemia/Diabetes Medications:   Antihyperglycemics (From admission, onward)      Start     Stop Route Frequency Ordered    03/03/24 1200  insulin aspart U-100 pen 7 Units         -- SubQ Every 4 hours 03/03/24 0835    02/27/24 1354  insulin aspart U-100 pen 0-10 Units         -- SubQ Every 4 hours PRN 02/27/24 1255    02/13/24 0915  insulin detemir U-100 (Levemir) pen 14 Units         -- SubQ Daily 02/13/24 0906            ASSESSMENT and PLAN    Renal/  * Ayaz's gangrene  Managed per primary team  Optimize BG control        ALVARADO (acute kidney injury)  Lab Results   Component Value Date    CREATININE 3.3 (H) 03/03/2024     Avoid insulin stacking  Titrate insulin slowly       Endocrine  Morbid " (severe) obesity due to excess calories  Body mass index is 51.42 kg/m².  May increase insulin resistance.         New onset type 2 diabetes mellitus  BG goal 140-180  No previous hx of T2DM per family at bedside. A1c of 10.4. DKA at OSH. On TF. BG stable on regimen.        Plan:  - Continue Levemir 14 units daily.  - Increase Novolog to 7 units q 4 hrs while on tube feeding (HOLD if tube feeding is stopped or BG < 100) increased based on prn SQ insulin requirements.   - Continue Moderate Dose Correction Scale  - BG monitoring q 4 hrs while NPO /TF    ** Please call Endocrine for any BG related issues **      Discharge plans: TBD    Lab Results   Component Value Date    HGBA1C 10.4 (H) 01/30/2024             Zoie Muñiz NP  Endocrinology  [facility]

## 2024-03-03 NOTE — PLAN OF CARE
Problem: Adult Inpatient Plan of Care  Goal: Plan of Care Review  Outcome: Ongoing, Progressing  Goal: Patient-Specific Goal (Individualized)  Outcome: Ongoing, Progressing  Goal: Absence of Hospital-Acquired Illness or Injury  Outcome: Ongoing, Progressing  Goal: Optimal Comfort and Wellbeing  Outcome: Ongoing, Progressing  Goal: Readiness for Transition of Care  Outcome: Ongoing, Progressing     Problem: Bariatric Environmental Safety  Goal: Safety Maintained with Care  Outcome: Ongoing, Progressing     Problem: Diabetes Comorbidity  Goal: Blood Glucose Level Within Targeted Range  Outcome: Ongoing, Progressing     Problem: Adjustment to Illness (Sepsis/Septic Shock)  Goal: Optimal Coping  Outcome: Ongoing, Progressing     Problem: Bleeding (Sepsis/Septic Shock)  Goal: Absence of Bleeding  Outcome: Ongoing, Progressing     Problem: Glycemic Control Impaired (Sepsis/Septic Shock)  Goal: Blood Glucose Level Within Desired Range  Outcome: Ongoing, Progressing     Problem: Infection Progression (Sepsis/Septic Shock)  Goal: Absence of Infection Signs and Symptoms  Outcome: Ongoing, Progressing     Problem: Nutrition Impaired (Sepsis/Septic Shock)  Goal: Optimal Nutrition Intake  Outcome: Ongoing, Progressing     Problem: Diabetic Ketoacidosis  Goal: Fluid and Electrolyte Balance with Absence of Ketosis  Outcome: Ongoing, Progressing     Problem: Fluid and Electrolyte Imbalance (Acute Kidney Injury/Impairment)  Goal: Fluid and Electrolyte Balance  Outcome: Ongoing, Progressing     Problem: Oral Intake Inadequate (Acute Kidney Injury/Impairment)  Goal: Optimal Nutrition Intake  Outcome: Ongoing, Progressing     Problem: Renal Function Impairment (Acute Kidney Injury/Impairment)  Goal: Effective Renal Function  Outcome: Ongoing, Progressing     Problem: Infection  Goal: Absence of Infection Signs and Symptoms  Outcome: Ongoing, Progressing     Problem: Device-Related Complication Risk (Hemodialysis)  Goal: Safe,  Effective Therapy Delivery  Outcome: Ongoing, Progressing     Problem: Hemodynamic Instability (Hemodialysis)  Goal: Effective Tissue Perfusion  Outcome: Ongoing, Progressing     Problem: Infection (Hemodialysis)  Goal: Absence of Infection Signs and Symptoms  Outcome: Ongoing, Progressing     Problem: Fall Injury Risk  Goal: Absence of Fall and Fall-Related Injury  Outcome: Ongoing, Progressing     Problem: Device-Related Complication Risk (CRRT (Continuous Renal Replacement Therapy))  Goal: Safe, Effective Therapy Delivery  Outcome: Ongoing, Progressing     Problem: Hypothermia (CRRT (Continuous Renal Replacement Therapy))  Goal: Body Temperature Maintained in Desired Range  Outcome: Ongoing, Progressing     Problem: Infection (CRRT (Continuous Renal Replacement Therapy))  Goal: Absence of Infection Signs and Symptoms  Outcome: Ongoing, Progressing     Problem: Communication Impairment (Mechanical Ventilation, Invasive)  Goal: Effective Communication  Outcome: Ongoing, Progressing     Problem: Device-Related Complication Risk (Mechanical Ventilation, Invasive)  Goal: Optimal Device Function  Outcome: Ongoing, Progressing     Problem: Inability to Wean (Mechanical Ventilation, Invasive)  Goal: Mechanical Ventilation Liberation  Outcome: Ongoing, Progressing     Problem: Nutrition Impairment (Mechanical Ventilation, Invasive)  Goal: Optimal Nutrition Delivery  Outcome: Ongoing, Progressing     Problem: Skin and Tissue Injury (Mechanical Ventilation, Invasive)  Goal: Absence of Device-Related Skin and Tissue Injury  Outcome: Ongoing, Progressing     Problem: Ventilator-Induced Lung Injury (Mechanical Ventilation, Invasive)  Goal: Absence of Ventilator-Induced Lung Injury  Outcome: Ongoing, Progressing     Problem: Coping Ineffective  Goal: Effective Coping  Outcome: Ongoing, Progressing  [facility] Plan of Care Note  Dx Final diagnoses:  [R00.0] Tachycardia  [E13.10] Diabetic ketoacidosis without coma associated with  other specified diabetes mellitus (Primary)  [N49.3] Ayaz's gangrene  [N17.9] ALVARADO (acute kidney injury)     Shift Events replaced bms    Neuro: anable to follow commands or verbal    Vital Signs: wnl    Respiratory: o2 28% trach collar, suction prn    Diet: tube feeding    Urine Output/Bowel Movement: per catheter/ per bms

## 2024-03-03 NOTE — PLAN OF CARE
Problem: Adult Inpatient Plan of Care  Goal: Plan of Care Review  Outcome: Ongoing, Progressing  Goal: Patient-Specific Goal (Individualized)  Outcome: Ongoing, Progressing  Goal: Absence of Hospital-Acquired Illness or Injury  Outcome: Ongoing, Progressing  Goal: Optimal Comfort and Wellbeing  Outcome: Ongoing, Progressing  Goal: Readiness for Transition of Care  Outcome: Ongoing, Progressing     Problem: Bariatric Environmental Safety  Goal: Safety Maintained with Care  Outcome: Ongoing, Progressing     Problem: Diabetes Comorbidity  Goal: Blood Glucose Level Within Targeted Range  Outcome: Ongoing, Progressing     Problem: Adjustment to Illness (Sepsis/Septic Shock)  Goal: Optimal Coping  Outcome: Ongoing, Progressing

## 2024-03-03 NOTE — ASSESSMENT & PLAN NOTE
Lab Results   Component Value Date    CREATININE 3.3 (H) 03/03/2024     Avoid insulin stacking  Titrate insulin slowly

## 2024-03-03 NOTE — RESPIRATORY THERAPY
"RAPID RESPONSE RESPIRATORY THERAPY PROACTIVE NOTE           Time of visit: 836    Code Status: Full Code   : 1970  Bed: [facility]6/1046 A:   MRN: [id number]  Time spent at the bedside: < 15 min    SITUATION    Evaluated patient for: LDA Check     BACKGROUND    Patient has no past medical history on file.  Clinically Significant Surgical Hx: tracheostomy    24 Hours Vitals Range:  Temp:  [98 °F (36.7 °C)-99.1 °F (37.3 °C)]   Pulse:  [83-96]   Resp:  [16-18]   BP: (132-175)/(67-83)   SpO2:  [94 %-99 %]     Labs:    Recent Labs     24  1435 24  0456 24  0419   * 134* 134*   K 5.3* 5.1 4.9   CL 99 98 100   CO2 21* 21* 19*   BUN 93* 99* 104*   CREATININE 3.7* 3.9* 3.3*   * 166* 172*   PHOS 6.7* 6.6* 5.8*   MG 2.2 2.2 2.1        No results for input(s): "PH", "PCO2", "PO2", "HCO3", "POCSATURATED", "BE" in the last 72 hours.    ASSESSMENT/INTERVENTIONS  Pt resting comfortably, no respiratory concern during visit.      Last VS   Temp: 98.6 °F (37 °C) (851)  Pulse: 93 (851)  Resp: 18 (851)  BP: 151/83 (851)  SpO2: 99 % (851)      Extra trachs at bedside: 8.0 & 6.0 Shiley cuffed  Level of Consciousness: Level of Consciousness (AVPU): responds to voice  Respiratory Effort: Respiratory Effort: Unlabored Expansion/Accessory Muscle Usage: Expansion/Accessory Muscles/Retractions: no use of accessory muscles  All Lung Field Breath Sounds: All Lung Fields Breath Sounds: Anterior:, Lateral:, diminished  JOSE Breath Sounds: clear  LLL Breath Sounds: clear, diminished  RUL Breath Sounds: clear  RML Breath Sounds: clear, diminished  RLL Breath Sounds: clear, diminished  O2 Device/Concentration: trach collar 5L/28%  Surgical airway: Yes, Type: Shiley Size: 8, cuffed  Ambu at bedside: Yes     Active Orders   Respiratory Care    Inhalation Treatment Q4H PRN     Frequency: Q4H PRN     Number of Occurrences: Until Specified    Oxygen Continuous     Frequency: Continuous     " Number of Occurrences: Until Specified     Order Questions:      Device type: Low flow      Device: Trach Collar      FiO2%: 40      Titrate O2 per Oxygen Titration Protocol: Yes      To maintain SpO2 goal of: >= 90%      Notify MD of: Inability to achieve desired SpO2; Sudden change in patient status and requires 20% increase in FiO2; Patient requires >60% FiO2    Pulse Oximetry Continuous     Frequency: Continuous     Number of Occurrences: Until Specified    Routine tracheostomy care     Frequency: BID     Number of Occurrences: Until Specified    SUCTION Q4H     Frequency: Q4H     Number of Occurrences: Until Specified       RECOMMENDATIONS    We recommend: RRT Recs: Continue POC per primary team.      FOLLOW-UP    Please call back the Rapid Response RT, Anshul Wiseman RRT at x [phone] for any questions or concerns.

## 2024-03-03 NOTE — ASSESSMENT & PLAN NOTE
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 Unfortunately, has multiple infarcts on MRI brain with unchanged neuro status, however, family would like to proceed with all measures. S/p trach, peg, and lower wound closure, replacement of wound vac in right groin on 2/22/24. IR placement of HD line 2/29     - Wound vac changed 2/29 with wound care  - HD per nephro - lasix gtt at 5cc/h   - Continue TF   - LP 2/16 - NGTD  - ID consulted for urine cultures   - UA growing Burkholderia species and Chryseibacterium Indologenes,  IV zosyn completed 2/22  - Palliative following, full code  - Stable on trach collar  - Okay for DVT ppx       Dispo: LTAC placement, now medically stable for transfer

## 2024-03-03 NOTE — SUBJECTIVE & OBJECTIVE
Interval History: No issues overnight. Remains on 5L 28% trach collar. Tolerating TF. WV to right abdominal wound in place, good seal     Medications:  Continuous Infusions:  Scheduled Meds:   sodium chloride 0.9%   Intravenous Once    amLODIPine  10 mg Per G Tube Daily    carvediloL  25 mg Per G Tube BID    heparin (porcine)  7,500 Units Subcutaneous Q8H    insulin aspart U-100  7 Units Subcutaneous Q4H    insulin detemir U-100  14 Units Subcutaneous Daily    psyllium husk (aspartame)  1 packet Per G Tube Daily    sevelamer carbonate  1.6 g Per G Tube TID     PRN Meds:sodium chloride 0.9%, acetaminophen, albuterol-ipratropium, dextrose 10%, dextrose 10%, glucagon (human recombinant), glucose, glucose, hydrALAZINE, insulin aspart U-100, labetalol, naloxone, ondansetron, prochlorperazine, sodium chloride 0.9%, sodium chloride 0.9%     Review of patient's allergies indicates:  No Known Allergies  Objective:     Vital Signs (Most Recent):  Temp: 98.6 °F (37 °C) (03/03/24 0851)  Pulse: 93 (03/03/24 0851)  Resp: 18 (03/03/24 0851)  BP: (!) 151/83 (03/03/24 0851)  SpO2: 99 % (03/03/24 0851) Vital Signs (24h Range):  Temp:  [98 °F (36.7 °C)-99.1 °F (37.3 °C)] 98.6 °F (37 °C)  Pulse:  [83-96] 93  Resp:  [16-18] 18  SpO2:  [94 %-99 %] 99 %  BP: (132-175)/(67-83) 151/83     Weight: (!) 140.2 kg (309 lb)  Body mass index is 51.42 kg/m².    Intake/Output - Last 3 Shifts         03/01 0700  03/02 0659 03/02 0700 03/03 0659 03/03 0700  03/04 0659    P.O. 0      I.V. (mL/kg)       NG/ 750     IV Piggyback       Total Intake(mL/kg) 548 (3.9) 750 (5.3)     Urine (mL/kg/hr) 1100 (0.3) 1425 (0.4)     Drains 0 0     Other 35 0     Stool 2 200     Total Output 1137 1625     Net -589 -875            Stool Occurrence 2 x               Physical Exam  Vitals and nursing note reviewed.   Constitutional:       General: She is not in acute distress.     Appearance: She is ill-appearing.   HENT:      Head: Normocephalic and atraumatic.    Eyes:      Comments: Not opening eyes to command    Cardiovascular:      Rate and Rhythm: Normal rate and regular rhythm.      Comments: Permacath in place, clean/dry  Pulmonary:      Effort: Pulmonary effort is normal.      Comments: trached    Abdominal:      General: There is no distension.      Palpations: Abdomen is soft.      Tenderness: There is no abdominal tenderness.      Comments: PEG in place   Genitourinary:     Comments: Glynn in place    Skin:     General: Skin is warm and dry.      Comments: Wound vac in place to R thigh/groin to suction  Re approximated skin clean dry and intact   Neurological:      Comments: Occasionally withdraws from painful stimuli. No significant purposeful activity           Significant Labs:  I have reviewed all pertinent lab results within the past 24 hours.    Significant Diagnostics:  I have reviewed all pertinent imaging results/findings within the past 24 hours.

## 2024-03-04 LAB
ALBUMIN SERPL BCP-MCNC: 2.6 G/DL (ref 3.5–5.2)
ALBUMIN SERPL BCP-MCNC: 2.7 G/DL (ref 3.5–5.2)
ALP SERPL-CCNC: 158 U/L (ref 55–135)
ALT SERPL W/O P-5'-P-CCNC: 25 U/L (ref 10–44)
ANION GAP SERPL CALC-SCNC: 14 MMOL/L (ref 8–16)
ANION GAP SERPL CALC-SCNC: 16 MMOL/L (ref 8–16)
AST SERPL-CCNC: 34 U/L (ref 10–40)
BASOPHILS # BLD AUTO: 0.05 K/UL (ref 0–0.2)
BASOPHILS NFR BLD: 0.5 % (ref 0–1.9)
BILIRUB SERPL-MCNC: 0.2 MG/DL (ref 0.1–1)
BUN SERPL-MCNC: 113 MG/DL (ref 6–20)
BUN SERPL-MCNC: 116 MG/DL (ref 6–20)
CALCIUM SERPL-MCNC: 10.2 MG/DL (ref 8.7–10.5)
CALCIUM SERPL-MCNC: 9.9 MG/DL (ref 8.7–10.5)
CHLORIDE SERPL-SCNC: 105 MMOL/L (ref 95–110)
CHLORIDE SERPL-SCNC: 105 MMOL/L (ref 95–110)
CO2 SERPL-SCNC: 17 MMOL/L (ref 23–29)
CO2 SERPL-SCNC: 19 MMOL/L (ref 23–29)
CREAT SERPL-MCNC: 2.8 MG/DL (ref 0.5–1.4)
CREAT SERPL-MCNC: 3 MG/DL (ref 0.5–1.4)
DIFFERENTIAL METHOD BLD: ABNORMAL
EOSINOPHIL # BLD AUTO: 0.6 K/UL (ref 0–0.5)
EOSINOPHIL NFR BLD: 5.4 % (ref 0–8)
ERYTHROCYTE [DISTWIDTH] IN BLOOD BY AUTOMATED COUNT: 16.4 % (ref 11.5–14.5)
EST. GFR  (NO RACE VARIABLE): 18 ML/MIN/1.73 M^2
EST. GFR  (NO RACE VARIABLE): 19.6 ML/MIN/1.73 M^2
FUNGUS SPEC CULT: NORMAL
FUNGUS SPEC CULT: NORMAL
GLUCOSE SERPL-MCNC: 160 MG/DL (ref 70–110)
GLUCOSE SERPL-MCNC: 168 MG/DL (ref 70–110)
HCT VFR BLD AUTO: 27.6 % (ref 37–48.5)
HGB BLD-MCNC: 8.9 G/DL (ref 12–16)
IMM GRANULOCYTES # BLD AUTO: 0.07 K/UL (ref 0–0.04)
IMM GRANULOCYTES NFR BLD AUTO: 0.6 % (ref 0–0.5)
LYMPHOCYTES # BLD AUTO: 2.1 K/UL (ref 1–4.8)
LYMPHOCYTES NFR BLD: 19 % (ref 18–48)
MAGNESIUM SERPL-MCNC: 2.2 MG/DL (ref 1.6–2.6)
MCH RBC QN AUTO: 28.6 PG (ref 27–31)
MCHC RBC AUTO-ENTMCNC: 32.2 G/DL (ref 32–36)
MCV RBC AUTO: 89 FL (ref 82–98)
MONOCYTES # BLD AUTO: 1.1 K/UL (ref 0.3–1)
MONOCYTES NFR BLD: 10.2 % (ref 4–15)
NEUTROPHILS # BLD AUTO: 7 K/UL (ref 1.8–7.7)
NEUTROPHILS NFR BLD: 64.3 % (ref 38–73)
NRBC BLD-RTO: 0 /100 WBC
PHOSPHATE SERPL-MCNC: 5.4 MG/DL (ref 2.7–4.5)
PHOSPHATE SERPL-MCNC: 5.7 MG/DL (ref 2.7–4.5)
PLATELET # BLD AUTO: 506 K/UL (ref 150–450)
PMV BLD AUTO: 10.5 FL (ref 9.2–12.9)
POCT GLUCOSE: 171 MG/DL (ref 70–110)
POCT GLUCOSE: 176 MG/DL (ref 70–110)
POCT GLUCOSE: 180 MG/DL (ref 70–110)
POCT GLUCOSE: 186 MG/DL (ref 70–110)
POCT GLUCOSE: 188 MG/DL (ref 70–110)
POCT GLUCOSE: 189 MG/DL (ref 70–110)
POCT GLUCOSE: 193 MG/DL (ref 70–110)
POTASSIUM SERPL-SCNC: 5 MMOL/L (ref 3.5–5.1)
POTASSIUM SERPL-SCNC: 6 MMOL/L (ref 3.5–5.1)
PROT SERPL-MCNC: 8.6 G/DL (ref 6–8.4)
RBC # BLD AUTO: 3.11 M/UL (ref 4–5.4)
SODIUM SERPL-SCNC: 138 MMOL/L (ref 136–145)
SODIUM SERPL-SCNC: 138 MMOL/L (ref 136–145)
WBC # BLD AUTO: 10.88 K/UL (ref 3.9–12.7)

## 2024-03-04 PROCEDURE — 27000207 HC ISOLATION

## 2024-03-04 PROCEDURE — 99900035 HC TECH TIME PER 15 MIN (STAT)

## 2024-03-04 PROCEDURE — 27000221 HC OXYGEN, UP TO 24 HOURS

## 2024-03-04 PROCEDURE — 80069 RENAL FUNCTION PANEL: CPT | Performed by: STUDENT IN AN ORGANIZED HEALTH CARE EDUCATION/TRAINING PROGRAM

## 2024-03-04 PROCEDURE — 25000242 PHARM REV CODE 250 ALT 637 W/ HCPCS

## 2024-03-04 PROCEDURE — 99900026 HC AIRWAY MAINTENANCE (STAT)

## 2024-03-04 PROCEDURE — 20600001 HC STEP DOWN PRIVATE ROOM

## 2024-03-04 PROCEDURE — 36415 COLL VENOUS BLD VENIPUNCTURE: CPT

## 2024-03-04 PROCEDURE — 63600175 PHARM REV CODE 636 W HCPCS

## 2024-03-04 PROCEDURE — 63600175 PHARM REV CODE 636 W HCPCS: Performed by: STUDENT IN AN ORGANIZED HEALTH CARE EDUCATION/TRAINING PROGRAM

## 2024-03-04 PROCEDURE — 36415 COLL VENOUS BLD VENIPUNCTURE: CPT | Mod: XB | Performed by: STUDENT IN AN ORGANIZED HEALTH CARE EDUCATION/TRAINING PROGRAM

## 2024-03-04 PROCEDURE — 85025 COMPLETE CBC W/AUTO DIFF WBC: CPT

## 2024-03-04 PROCEDURE — 84100 ASSAY OF PHOSPHORUS: CPT

## 2024-03-04 PROCEDURE — 99232 SBSQ HOSP IP/OBS MODERATE 35: CPT | Mod: ,,, | Performed by: NURSE PRACTITIONER

## 2024-03-04 PROCEDURE — 99024 POSTOP FOLLOW-UP VISIT: CPT | Mod: ,,, | Performed by: STUDENT IN AN ORGANIZED HEALTH CARE EDUCATION/TRAINING PROGRAM

## 2024-03-04 PROCEDURE — 83735 ASSAY OF MAGNESIUM: CPT

## 2024-03-04 PROCEDURE — 63600175 PHARM REV CODE 636 W HCPCS: Performed by: NURSE PRACTITIONER

## 2024-03-04 PROCEDURE — 25000003 PHARM REV CODE 250

## 2024-03-04 PROCEDURE — 99233 SBSQ HOSP IP/OBS HIGH 50: CPT | Mod: ,,, | Performed by: INTERNAL MEDICINE

## 2024-03-04 PROCEDURE — 80053 COMPREHEN METABOLIC PANEL: CPT

## 2024-03-04 PROCEDURE — 97605 NEG PRS WND THER DME<=50SQCM: CPT

## 2024-03-04 PROCEDURE — 94761 N-INVAS EAR/PLS OXIMETRY MLT: CPT

## 2024-03-04 PROCEDURE — 25000003 PHARM REV CODE 250: Performed by: STUDENT IN AN ORGANIZED HEALTH CARE EDUCATION/TRAINING PROGRAM

## 2024-03-04 RX ORDER — INSULIN ASPART 100 [IU]/ML
8 INJECTION, SOLUTION INTRAVENOUS; SUBCUTANEOUS EVERY 4 HOURS
Status: DISCONTINUED | OUTPATIENT
Start: 2024-03-04 | End: 2024-03-05

## 2024-03-04 RX ORDER — SODIUM BICARBONATE 650 MG/1
1300 TABLET ORAL 2 TIMES DAILY
Status: DISCONTINUED | OUTPATIENT
Start: 2024-03-04 | End: 2024-03-05 | Stop reason: HOSPADM

## 2024-03-04 RX ORDER — FUROSEMIDE 10 MG/ML
40 INJECTION INTRAMUSCULAR; INTRAVENOUS ONCE
Status: COMPLETED | OUTPATIENT
Start: 2024-03-04 | End: 2024-03-04

## 2024-03-04 RX ADMIN — SEVELAMER CARBONATE 1.6 G: 800 POWDER, FOR SUSPENSION ORAL at 08:03

## 2024-03-04 RX ADMIN — SODIUM BICARBONATE 650 MG TABLET 1300 MG: at 03:03

## 2024-03-04 RX ADMIN — INSULIN ASPART 8 UNITS: 100 INJECTION, SOLUTION INTRAVENOUS; SUBCUTANEOUS at 12:03

## 2024-03-04 RX ADMIN — INSULIN ASPART 1 UNITS: 100 INJECTION, SOLUTION INTRAVENOUS; SUBCUTANEOUS at 12:03

## 2024-03-04 RX ADMIN — INSULIN ASPART 2 UNITS: 100 INJECTION, SOLUTION INTRAVENOUS; SUBCUTANEOUS at 08:03

## 2024-03-04 RX ADMIN — CARVEDILOL 25 MG: 25 TABLET, FILM COATED ORAL at 08:03

## 2024-03-04 RX ADMIN — SODIUM ZIRCONIUM CYCLOSILICATE 10 G: 5 POWDER, FOR SUSPENSION ORAL at 10:03

## 2024-03-04 RX ADMIN — INSULIN ASPART 8 UNITS: 100 INJECTION, SOLUTION INTRAVENOUS; SUBCUTANEOUS at 08:03

## 2024-03-04 RX ADMIN — INSULIN ASPART 8 UNITS: 100 INJECTION, SOLUTION INTRAVENOUS; SUBCUTANEOUS at 04:03

## 2024-03-04 RX ADMIN — INSULIN ASPART 7 UNITS: 100 INJECTION, SOLUTION INTRAVENOUS; SUBCUTANEOUS at 12:03

## 2024-03-04 RX ADMIN — INSULIN ASPART 2 UNITS: 100 INJECTION, SOLUTION INTRAVENOUS; SUBCUTANEOUS at 12:03

## 2024-03-04 RX ADMIN — INSULIN ASPART 7 UNITS: 100 INJECTION, SOLUTION INTRAVENOUS; SUBCUTANEOUS at 04:03

## 2024-03-04 RX ADMIN — SEVELAMER CARBONATE 1.6 G: 800 POWDER, FOR SUSPENSION ORAL at 03:03

## 2024-03-04 RX ADMIN — INSULIN ASPART 7 UNITS: 100 INJECTION, SOLUTION INTRAVENOUS; SUBCUTANEOUS at 08:03

## 2024-03-04 RX ADMIN — FUROSEMIDE 40 MG: 10 INJECTION, SOLUTION INTRAVENOUS at 09:03

## 2024-03-04 RX ADMIN — INSULIN ASPART 1 UNITS: 100 INJECTION, SOLUTION INTRAVENOUS; SUBCUTANEOUS at 04:03

## 2024-03-04 RX ADMIN — PSYLLIUM HUSK 1 PACKET: 3.4 POWDER ORAL at 09:03

## 2024-03-04 RX ADMIN — INSULIN ASPART 1 UNITS: 100 INJECTION, SOLUTION INTRAVENOUS; SUBCUTANEOUS at 08:03

## 2024-03-04 RX ADMIN — INSULIN DETEMIR 14 UNITS: 100 INJECTION, SOLUTION SUBCUTANEOUS at 08:03

## 2024-03-04 RX ADMIN — HEPARIN SODIUM 7500 UNITS: 5000 INJECTION INTRAVENOUS; SUBCUTANEOUS at 03:03

## 2024-03-04 RX ADMIN — SODIUM BICARBONATE 650 MG TABLET 1300 MG: at 08:03

## 2024-03-04 RX ADMIN — HEPARIN SODIUM 7500 UNITS: 5000 INJECTION INTRAVENOUS; SUBCUTANEOUS at 05:03

## 2024-03-04 RX ADMIN — INSULIN ASPART 2 UNITS: 100 INJECTION, SOLUTION INTRAVENOUS; SUBCUTANEOUS at 04:03

## 2024-03-04 RX ADMIN — SODIUM CHLORIDE 1000 ML: 9 INJECTION, SOLUTION INTRAVENOUS at 09:03

## 2024-03-04 RX ADMIN — AMLODIPINE BESYLATE 10 MG: 10 TABLET ORAL at 08:03

## 2024-03-04 RX ADMIN — INSULIN ASPART 8 UNITS: 100 INJECTION, SOLUTION INTRAVENOUS; SUBCUTANEOUS at 11:03

## 2024-03-04 NOTE — PROGRESS NOTES
Woody Jones - [facility]  Wound Care    Patient Name:  Sarah Saravia   MRN:  [id number]  Date: 3/4/2024  Diagnosis: Ayaz's gangrene    History:     History reviewed. No pertinent past medical history.    Social History     Socioeconomic History    Marital status: Single     Social Determinants of Health     Financial Resource Strain: Patient Unable To Answer (1/31/2024)    Overall Financial Resource Strain (CARDIA)     Difficulty of Paying Living Expenses: Patient unable to answer   Food Insecurity: Patient Declined (1/31/2024)    Hunger Vital Sign     Worried About Running Out of Food in the Last Year: Patient declined     Ran Out of Food in the Last Year: Patient declined   Transportation Needs: Patient Unable To Answer (1/31/2024)    PRAPARE - Transportation     Lack of Transportation (Medical): Patient unable to answer     Lack of Transportation (Non-Medical): Patient unable to answer   Stress: Patient Unable To Answer (1/31/2024)    [employer] - Occupational Stress Questionnaire     Feeling of Stress : Patient unable to answer   Housing Stability: Patient Unable To Answer (1/31/2024)    Housing Stability Vital Sign     Unable to Pay for Housing in the Last Year: Patient unable to answer     Unstable Housing in the Last Year: Patient unable to answer       Precautions:     Allergies as of 01/29/2024    (No Known Allergies)       [facility] Assessment Details/Treatment   Patient seen for wound care consultation.   Reviewed chart for this encounter.   See Flow Sheet for findings.     Wound vac dressing changed as directed. Cleansed wound bed with vashe wound cleanser and applied skin prep to periwound. Applied 1 piece of black foam to wound bed and obtained a seal at 125mmHg. Foam/mepilex dressing placed over trac pad for pressure prevention. Assessment and picture listed below.       RECOMMENDATIONS:  - Wound care to complete wound vac dressing changes on Mondays/Thursdays  - Turning every 2  hours  - Heel protecting boots  - Bariatric immerse mattress   - Bedside nursing to maintain pressure injury prevention interventions       03/04/24 0949        Incision/Site 01/29/24 2300 Right Groin   Date First Assessed/Time First Assessed: 01/29/24 2300   Side: Right  Location: Groin  Additional Comments: right groin area left open with betadine soaked Kerlix packing/4x4's and ABD's   Wound Image    Incision WDL ex   Dressing Appearance Intact;Moist drainage   Drainage Amount Scant   Drainage Characteristics/Odor Serosanguineous   Appearance Pink;Red;Granulating;Moist   Periwound Area Intact;Dry   Wound Length (cm) 1 cm   Wound Width (cm) 11 cm   Wound Depth (cm) 1.7 cm   Wound Volume (cm^3) 18.7 cm^3   Wound Surface Area (cm^2) 11 cm^2   Care Cleansed with:;Wound cleanser;Other (see comments)  (vashe)   Dressing Changed   Periwound Care Skin barrier film applied   Dressing Change Due 03/07/24        Negative Pressure Wound Therapy  02/22/24 1418 Right upper   Placement Date/Time: 02/22/24 1418   Side: Right  Orientation: upper  Location: Groin   NPWT Type Vacuum Therapy   Therapy Setting NPWT Continuous therapy   Pressure Setting NPWT 125 mmHg   Therapy Interventions NPWT Dressing changed   Sponges Inserted NPWT Black;1   Sponges Removed NPWT Black;1       03/04/2024

## 2024-03-04 NOTE — PROGRESS NOTES
"Woody Robert - [facility]  Adult Nutrition  Progress Note    SUMMARY       Recommendations    1.) TF recs: continue with Peptamen VHP @50ml/hr to provide 1200 kcal, 110g PRO, and 1008ml FF- FWF per MD.                 - if diarrhea is persistent, continue to add psyllium husk BID to help bulk up stools.      2.)  Monitor for s/s of intolerance such as residuals >500ml, n/v/d, or abdominal distension.      3.)  RD to monitor tolerance, skin, labs, wt.      Goals: Meet % EEN/EPN by follow-up date.  Nutrition Goal Status: goal met  Communication of RD Recs:  (POC)    Assessment and Plan    Nutrition Problem  Inadequate oral intake     Related to (etiology):   Diagnosis related symptoms     Signs and Symptoms (as evidenced by):   Intubated  NPO     Interventions/Recommendations (treatment strategy):  Collaboration with medical providers     Nutrition Diagnosis Status:   Continues    Reason for Assessment    Reason For Assessment: RD follow-up  Diagnosis:  (ros's gangrene)  Relevant Medical History: obesity, T2DM, CKD  Interdisciplinary Rounds: did not attend  General Information Comments: RD f/u, pt pending LTACH admit. Pt resting. TF running at 50ml/hr. Pt with new onset DM per NP note. RD following.  Nutrition Discharge Planning: pending medical course    Nutrition Risk Screen    Nutrition Risk Screen: tube feeding or parenteral nutrition    Nutrition/Diet History    Food Allergies: NKFA    Anthropometrics    Temp: 97.7 °F (36.5 °C)  Height Method: Stated  Height: 5' 5" (165.1 cm)  Height (inches): 65 in  Weight Method: Bed Scale  Weight: (!) 140.2 kg (309 lb)  Weight (lb): (!) 309 lb  Ideal Body Weight (IBW), Female: 125 lb  % Ideal Body Weight, Female (lb): 285.6 %  BMI (Calculated): 51.4  BMI Grade: greater than 40 - morbid obesity       Lab/Procedures/Meds    Pertinent Labs Reviewed: reviewed  Pertinent Labs Comments: H/h: 8.9/27.6, potassium: 6, BUN: 113, creatinine: 3, GFR: 18, Glu: 160, Phos: 5.7, ALP: " 158  Pertinent Medications Reviewed: reviewed  Pertinent Medications Comments: amlodipine, carvedilol, heparin, insulin, psyllium husk, sevelamer, NaCl      Estimated/Assessed Needs    Weight Used For Calorie Calculations: 57 kg (125 lb 10.6 oz) (IBW d/t BMI >50.0)  Energy Calorie Requirements (kcal): 1254- 1425 kcal  Energy Need Method: Kcal/kg (22-25 kcal/kg of IBW)  Protein Requirements: 114- 143g (2.0-2.5g/kg)  Weight Used For Protein Calculations: 57 kg (125 lb 10.6 oz) (IBW d/t BMI >50.0)     Estimated Fluid Requirement Method: RDA Method  RDA Method (mL): 1254         Nutrition Prescription Ordered    Current Diet Order: NPO  Current Nutrition Support Formula Ordered: Peptamen Intense VHP  Current Nutrition Support Rate Ordered: 50 (ml)    Evaluation of Received Nutrient/Fluid Intake    Enteral Calories (kcal): 1200  Enteral Protein (gm): 110  Enteral (Free Water) Fluid (mL): 1008  % Kcal Needs: 95  % Protein Needs: 96  I/O: -1 L since 2/19  Energy Calories Required: meeting needs  Protein Required: meeting needs  Fluid Required:  (as per MD)  Comments: LBM 3/4 (loose)  Tolerance: tolerating  % Intake of Estimated Energy Needs: 75 - 100 %  % Meal Intake: NPO    Nutrition Risk    Level of Risk/Frequency of Follow-up:  (RD to f/u x 1-2/week)     Monitor and Evaluation    Food and Nutrient Intake: enteral nutrition intake, parenteral nutrition intake  Food and Nutrient Adminstration: enteral and parenteral nutrition administration  Knowledge/Beliefs/Attitudes: food and nutrition knowledge/skill, beliefs and attitudes  Physical Activity and Function: nutrition-related ADLs and IADLs, factors affecting access to physical activity  Anthropometric Measurements: weight, weight change, body mass index  Biochemical Data, Medical Tests and Procedures: electrolyte and renal panel  Nutrition-Focused Physical Findings: overall appearance     Nutrition Follow-Up    RD Follow-up?: Yes    Rahel Low RD, LDN

## 2024-03-04 NOTE — SUBJECTIVE & OBJECTIVE
Interval History: NAEON. Afebrile. HDS. On trach collar. TF at 50cc/hr. AM labs pending    Medications:  Continuous Infusions:  Scheduled Meds:   sodium chloride 0.9%   Intravenous Once    amLODIPine  10 mg Per G Tube Daily    carvediloL  25 mg Per G Tube BID    heparin (porcine)  7,500 Units Subcutaneous Q8H    insulin aspart U-100  7 Units Subcutaneous Q4H    insulin detemir U-100  14 Units Subcutaneous Daily    psyllium husk (aspartame)  1 packet Per G Tube Daily    sevelamer carbonate  1.6 g Per G Tube TID     PRN Meds:sodium chloride 0.9%, acetaminophen, albuterol-ipratropium, dextrose 10%, dextrose 10%, glucagon (human recombinant), glucose, glucose, hydrALAZINE, insulin aspart U-100, labetalol, naloxone, ondansetron, prochlorperazine, sodium chloride 0.9%, sodium chloride 0.9%     Review of patient's allergies indicates:  No Known Allergies  Objective:     Vital Signs (Most Recent):  Temp: 100 °F (37.8 °C) (03/04/24 0351)  Pulse: 96 (03/04/24 0430)  Resp: 20 (03/04/24 0430)  BP: (!) 154/74 (03/04/24 0351)  SpO2: 99 % (03/04/24 0430) Vital Signs (24h Range):  Temp:  [98.4 °F (36.9 °C)-100.2 °F (37.9 °C)] 100 °F (37.8 °C)  Pulse:  [88-99] 96  Resp:  [16-20] 20  SpO2:  [96 %-100 %] 99 %  BP: (128-171)/(73-89) 154/74     Weight: (!) 140.2 kg (309 lb)  Body mass index is 51.42 kg/m².    Intake/Output - Last 3 Shifts         03/02 0700  03/03 0659 03/03 0700 03/04 0659 03/04 0700 03/05 0659    P.O.  0     NG/ 433     Total Intake(mL/kg) 750 (5.3) 433 (3.1)     Urine (mL/kg/hr) 1425 (0.4) 1325 (0.4)     Drains 0      Other 0 0     Stool 200 100     Total Output 1625 1425     Net -876 -997                     Physical Exam  Vitals and nursing note reviewed.   Constitutional:       General: She is not in acute distress.     Appearance: She is ill-appearing.   HENT:      Head: Normocephalic and atraumatic.      Mouth/Throat:      Mouth: Mucous membranes are moist.      Pharynx: Oropharynx is clear.   Eyes:       Comments: Not opening eyes to command    Cardiovascular:      Rate and Rhythm: Normal rate.      Comments: Permacath in place, clean/dry  Pulmonary:      Effort: Pulmonary effort is normal.      Comments: trached    Abdominal:      General: There is no distension.      Palpations: Abdomen is soft.      Tenderness: There is no abdominal tenderness.      Comments: PEG in place   Genitourinary:     Comments: Glynn in place    Skin:     General: Skin is warm and dry.      Comments: Wound vac in place to R thigh/groin to suction  Re approximated skin clean dry and intact   Neurological:      Comments: Occasionally withdraws from painful stimuli. No significant purposeful activity           Significant Labs:  I have reviewed all pertinent lab results within the past 24 hours.    Significant Diagnostics:  I have reviewed all pertinent imaging results/findings within the past 24 hours.

## 2024-03-04 NOTE — ASSESSMENT & PLAN NOTE
Transfer from [facility]. Admitted for fourniers gangrene. Initiated HD on 1/31. ALVARADO 2/2 ATN in setting of septic shock. Arrived with CR 3.8. Unknown baseline. S/P OR debridement with possible closure and wound vac placement     ALVARADO most likley ATN in setting of septic shock; ATN recovering.   UOP:     Intake/Output Summary (Last 24 hours) at 3/4/2024 1338  Last data filed at 3/4/2024 1335  Gross per 24 hour   Intake 2153 ml   Output 1900 ml   Net 253 ml       Plan/Recommendation  - Will give 1L normal saline and IV lasix 40mg one time dose.   - start sodium bicarb 1300mg BID. (Order placed).   - suspect azotemia 2/2 to increase protein intake from tube feeds. Recommend change to ~0.6/kg of protein in tube feeds.   - Will order repeat RFP, if remains with hyperkalemia will plan for iHD.   -Daily RFP   -Strict I&Os  -Avoid nephrotoxins, if possible

## 2024-03-04 NOTE — NURSING
.[facility] Plan of Care Note     Dx : Ayaz's Ganggrene ; DKA, ALVARADO     Shift Events: None     Goals of Care: Maintain patent airway; blood glucose monitoring;      Neuro: Unable to assess     Vital Signs:  WDL     Respiratory: 5L 28% trach collar     Diet: NPO ; TF peptamen intense     Is patient tolerating current diet? Yes     GTTS: N/A     Urine Output/Bowel Movement:  adequate     Drains/Tubes/Tube Feeds (include total output/shift): penrose drain x, R groin wound vac, PEG LUQ; FC     Lines: PIVx1        Accuchecks: q4 coverage given.     Skin: R groin sutures with wound vac and penrose drain     Fall Risk Score: 15     Activity level? Complete assist     Any scheduled procedures?  None     Any safety concerns? Aspiration Prec amd falls     Other: N/A

## 2024-03-04 NOTE — ASSESSMENT & PLAN NOTE
BG goal 140-180  No previous hx of T2DM per family at bedside. A1c of 10.4. DKA at OSH. On TF. BG stable on regimen.      Plan:  - Continue Levemir 14 units daily.  - Increase Novolog to 8 units q 4 hrs while on tube feeding (HOLD if tube feeding is stopped or BG < 100) increased based on prn SQ insulin requirements.   - Continue Moderate Dose Correction Scale  - BG monitoring q 4 hrs while NPO /TF    ** Please call Endocrine for any BG related issues **      Discharge plans: TBD    Lab Results   Component Value Date    HGBA1C 10.4 (H) 01/30/2024

## 2024-03-04 NOTE — PLAN OF CARE
[facility] Plan of Care Note  Dx Final diagnoses:  [R00.0] Tachycardia  [E13.10] Diabetic ketoacidosis without coma associated with other specified diabetes mellitus (Primary)  [N49.3] Ayaz's gangrene  [N17.9] ALVARADO (acute kidney injury)        Shift Events no significant events. Moving bilateral upper extremities, more alert today, but still not following commands    Neuro: does not follow commands, no changes from previous shift      Vital Signs: VSS     Respiratory: trach collar 5L 28%     Diet: NPO- tube feeds      Urine Output/Bowel Movement: meza/ FMS   Problem: Adult Inpatient Plan of Care  Goal: Plan of Care Review  Outcome: Ongoing, Progressing  Goal: Patient-Specific Goal (Individualized)  Outcome: Ongoing, Progressing  Goal: Absence of Hospital-Acquired Illness or Injury  Outcome: Ongoing, Progressing  Goal: Optimal Comfort and Wellbeing  Outcome: Ongoing, Progressing  Goal: Readiness for Transition of Care  Outcome: Ongoing, Progressing     Problem: Bariatric Environmental Safety  Goal: Safety Maintained with Care  Outcome: Ongoing, Progressing     Problem: Diabetes Comorbidity  Goal: Blood Glucose Level Within Targeted Range  Outcome: Ongoing, Progressing     Problem: Adjustment to Illness (Sepsis/Septic Shock)  Goal: Optimal Coping  Outcome: Ongoing, Progressing     Problem: Bleeding (Sepsis/Septic Shock)  Goal: Absence of Bleeding  Outcome: Ongoing, Progressing     Problem: Glycemic Control Impaired (Sepsis/Septic Shock)  Goal: Blood Glucose Level Within Desired Range  Outcome: Ongoing, Progressing     Problem: Infection Progression (Sepsis/Septic Shock)  Goal: Absence of Infection Signs and Symptoms  Outcome: Ongoing, Progressing     Problem: Nutrition Impaired (Sepsis/Septic Shock)  Goal: Optimal Nutrition Intake  Outcome: Ongoing, Progressing     Problem: Diabetic Ketoacidosis  Goal: Fluid and Electrolyte Balance with Absence of Ketosis  Outcome: Ongoing, Progressing     Problem: Fluid and  Electrolyte Imbalance (Acute Kidney Injury/Impairment)  Goal: Fluid and Electrolyte Balance  Outcome: Ongoing, Progressing     Problem: Oral Intake Inadequate (Acute Kidney Injury/Impairment)  Goal: Optimal Nutrition Intake  Outcome: Ongoing, Progressing     Problem: Renal Function Impairment (Acute Kidney Injury/Impairment)  Goal: Effective Renal Function  Outcome: Ongoing, Progressing     Problem: Infection  Goal: Absence of Infection Signs and Symptoms  Outcome: Ongoing, Progressing     Problem: Device-Related Complication Risk (Hemodialysis)  Goal: Safe, Effective Therapy Delivery  Outcome: Ongoing, Progressing     Problem: Hemodynamic Instability (Hemodialysis)  Goal: Effective Tissue Perfusion  Outcome: Ongoing, Progressing     Problem: Infection (Hemodialysis)  Goal: Absence of Infection Signs and Symptoms  Outcome: Ongoing, Progressing     Problem: Fall Injury Risk  Goal: Absence of Fall and Fall-Related Injury  Outcome: Ongoing, Progressing     Problem: Device-Related Complication Risk (CRRT (Continuous Renal Replacement Therapy))  Goal: Safe, Effective Therapy Delivery  Outcome: Ongoing, Progressing     Problem: Hypothermia (CRRT (Continuous Renal Replacement Therapy))  Goal: Body Temperature Maintained in Desired Range  Outcome: Ongoing, Progressing     Problem: Infection (CRRT (Continuous Renal Replacement Therapy))  Goal: Absence of Infection Signs and Symptoms  Outcome: Ongoing, Progressing     Problem: Communication Impairment (Mechanical Ventilation, Invasive)  Goal: Effective Communication  Outcome: Ongoing, Progressing     Problem: Device-Related Complication Risk (Mechanical Ventilation, Invasive)  Goal: Optimal Device Function  Outcome: Ongoing, Progressing     Problem: Inability to Wean (Mechanical Ventilation, Invasive)  Goal: Mechanical Ventilation Liberation  Outcome: Ongoing, Progressing     Problem: Nutrition Impairment (Mechanical Ventilation, Invasive)  Goal: Optimal Nutrition  Delivery  Outcome: Ongoing, Progressing     Problem: Skin and Tissue Injury (Mechanical Ventilation, Invasive)  Goal: Absence of Device-Related Skin and Tissue Injury  Outcome: Ongoing, Progressing     Problem: Ventilator-Induced Lung Injury (Mechanical Ventilation, Invasive)  Goal: Absence of Ventilator-Induced Lung Injury  Outcome: Ongoing, Progressing     Problem: Coping Ineffective  Goal: Effective Coping  Outcome: Ongoing, Progressing

## 2024-03-04 NOTE — SUBJECTIVE & OBJECTIVE
Interval History:     No acute events overnight. Patient mentation unchanged. Scr down to ~3 today; K is 6; BUN ~100.     Review of patient's allergies indicates:  No Known Allergies  Current Facility-Administered Medications   Medication Frequency    0.9%  NaCl infusion PRN    acetaminophen tablet 650 mg Q4H PRN    albuterol-ipratropium 2.5 mg-0.5 mg/3 mL nebulizer solution 3 mL Q4H PRN    amLODIPine tablet 10 mg Daily    carvediloL tablet 25 mg BID    dextrose 10% bolus 125 mL 125 mL PRN    dextrose 10% bolus 250 mL 250 mL PRN    glucagon (human recombinant) injection 1 mg PRN    glucose chewable tablet 16 g PRN    glucose chewable tablet 24 g PRN    heparin (porcine) injection 7,500 Units Q8H    hydrALAZINE tablet 25 mg Q8H PRN    insulin aspart U-100 pen 0-10 Units Q4H PRN    insulin aspart U-100 pen 8 Units Q4H    insulin detemir U-100 (Levemir) pen 14 Units Daily    labetalol 20 mg/4 mL (5 mg/mL) IV syring Q6H PRN    naloxone 0.4 mg/mL injection 0.02 mg PRN    ondansetron injection 4 mg Q6H PRN    prochlorperazine injection Soln 5 mg Q6H PRN    psyllium husk (aspartame) 3.4 gram packet 1 packet Daily    sevelamer carbonate pwpk 1.6 g TID    sodium chloride 0.9% bolus 250 mL 250 mL PRN    sodium chloride 0.9% flush 10 mL PRN       Objective:     Vital Signs (Most Recent):  Temp: 97.7 °F (36.5 °C) (03/04/24 1122)  Pulse: 92 (03/04/24 1220)  Resp: 17 (03/04/24 1220)  BP: (!) 144/77 (03/04/24 1122)  SpO2: 96 % (03/04/24 1220) Vital Signs (24h Range):  Temp:  [97.7 °F (36.5 °C)-100.2 °F (37.9 °C)] 97.7 °F (36.5 °C)  Pulse:  [89-99] 92  Resp:  [16-20] 17  SpO2:  [96 %-100 %] 96 %  BP: (144-171)/(73-89) 144/77     Weight: (!) 140.2 kg (309 lb) (02/29/24 0300)  Body mass index is 51.42 kg/m².  Body surface area is 2.54 meters squared.    I/O last 3 completed shifts:  In: 1763 [NG/GT:1763]  Out: 2325 [Urine:2125; Stool:200]     Physical Exam  Vitals and nursing note reviewed.   Constitutional:       General: She is  not in acute distress.     Appearance: She is ill-appearing.   HENT:      Head: Normocephalic and atraumatic.      Mouth/Throat:      Mouth: Mucous membranes are moist.      Pharynx: Oropharynx is clear.   Eyes:      Comments: Not opening eyes to command    Cardiovascular:      Rate and Rhythm: Normal rate.      Comments: Permacath in place, clean/dry  Pulmonary:      Effort: Pulmonary effort is normal.      Comments: trached    Abdominal:      General: There is no distension.      Palpations: Abdomen is soft.      Tenderness: There is no abdominal tenderness.      Comments: PEG in place   Genitourinary:     Comments: Glynn in place    Skin:     General: Skin is warm and dry.      Comments: Wound vac in place to R thigh/groin to suction  Re approximated skin clean dry and intact   Neurological:      Comments: Occasionally withdraws from painful stimuli. No significant purposeful activity           Significant Labs:  All labs within the past 24 hours have been reviewed.     Significant Imaging:  Labs: Reviewed

## 2024-03-04 NOTE — SUBJECTIVE & OBJECTIVE
Interval History: NAEO. VSS. AF. Patient with minimal spontaneous movement. K stable this morning after shifting with diuresis. Wound chare changed vac yesterday without issue.     Medications:  Continuous Infusions:  Scheduled Meds:   sodium chloride 0.9%   Intravenous Once    amLODIPine  10 mg Per G Tube Daily    carvediloL  25 mg Per G Tube BID    heparin (porcine)  7,500 Units Subcutaneous Q8H    insulin aspart U-100  7 Units Subcutaneous Q4H    insulin detemir U-100  14 Units Subcutaneous Daily    psyllium husk (aspartame)  1 packet Per G Tube Daily    sevelamer carbonate  1.6 g Per G Tube TID     PRN Meds:sodium chloride 0.9%, acetaminophen, albuterol-ipratropium, dextrose 10%, dextrose 10%, glucagon (human recombinant), glucose, glucose, hydrALAZINE, insulin aspart U-100, labetalol, naloxone, ondansetron, prochlorperazine, sodium chloride 0.9%, sodium chloride 0.9%     Review of patient's allergies indicates:  No Known Allergies  Objective:     Vital Signs (Most Recent):  Temp: 100 °F (37.8 °C) (03/04/24 0351)  Pulse: 91 (03/04/24 0659)  Resp: 20 (03/04/24 0430)  BP: (!) 154/74 (03/04/24 0351)  SpO2: 99 % (03/04/24 0530) Vital Signs (24h Range):  Temp:  [98.4 °F (36.9 °C)-100.2 °F (37.9 °C)] 100 °F (37.8 °C)  Pulse:  [88-99] 91  Resp:  [16-20] 20  SpO2:  [96 %-100 %] 99 %  BP: (128-171)/(73-89) 154/74     Weight: (!) 140.2 kg (309 lb)  Body mass index is 51.42 kg/m².    Intake/Output - Last 3 Shifts         03/02 0700  03/03 0659 03/03 0700  03/04 0659 03/04 0700 03/05 0659    P.O.  0     NG/ 1013     Total Intake(mL/kg) 750 (5.3) 1013 (7.2)     Urine (mL/kg/hr) 1425 (0.4) 1325 (0.4)     Drains 0      Other 0 0     Stool 200 100     Total Output 1625 1425     Net -652 -955                     Physical Exam  Vitals and nursing note reviewed.   Constitutional:       General: She is not in acute distress.     Appearance: She is ill-appearing.   HENT:      Head: Normocephalic and atraumatic.       Mouth/Throat:      Mouth: Mucous membranes are moist.      Pharynx: Oropharynx is clear.   Eyes:      Comments: Not opening eyes to command    Cardiovascular:      Rate and Rhythm: Normal rate.      Comments: Permacath in place, clean/dry  Pulmonary:      Effort: Pulmonary effort is normal.      Comments: trached    Abdominal:      General: There is no distension.      Palpations: Abdomen is soft.      Tenderness: There is no abdominal tenderness.      Comments: PEG in place   Genitourinary:     Comments: Glynn in place    Skin:     General: Skin is warm and dry.      Comments: Wound vac in place to R thigh/groin to suction  Re approximated skin clean dry and intact   Neurological:      Comments: Occasionally withdraws from painful stimuli. No significant purposeful activity           Significant Labs:  I have reviewed all pertinent lab results within the past 24 hours.  CBC:   Recent Labs   Lab 03/05/24  0529   WBC 10.47   RBC 3.09*   HGB 8.9*   HCT 28.1*   *   MCV 91   MCH 28.8   MCHC 31.7*     BMP:   Recent Labs   Lab 03/05/24  0529   *      K 4.9      CO2 20*   *   CREATININE 2.6*   CALCIUM 10.1   MG 2.1       Significant Diagnostics:  I have reviewed all pertinent imaging results/findings within the past 24 hours.

## 2024-03-04 NOTE — RESPIRATORY THERAPY
"RAPID RESPONSE RESPIRATORY THERAPY PROACTIVE NOTE           Time of visit: 075     Code Status: Full Code   : 1970  Bed: [facility]/104 A:   MRN: [id number]  Time spent at the bedside: < 15 min    SITUATION    Evaluated patient for: LDA Check     BACKGROUND    Patient has no past medical history on file.  Clinically Significant Surgical Hx: tracheostomy    24 Hours Vitals Range:  Temp:  [98.4 °F (36.9 °C)-100.2 °F (37.9 °C)]   Pulse:  [88-99]   Resp:  [16-20]   BP: (128-171)/(73-89)   SpO2:  [96 %-100 %]     Labs:    Recent Labs     24  0456 24  0419 24  0616   * 134* 138   K 5.1 4.9 6.0*   CL 98 100 105   CO2 21* 19* 17*   BUN 99* 104* 113*   CREATININE 3.9* 3.3* 3.0*   * 172* 160*   PHOS 6.6* 5.8* 5.7*   MG 2.2 2.1 2.2        No results for input(s): "PH", "PCO2", "PO2", "HCO3", "POCSATURATED", "BE" in the last 72 hours.    ASSESSMENT/INTERVENTIONS  All safety supplies visible at bedside.  No respiratory concerns at this time.      Last VS   Temp: 99 °F (37.2 °C) (841)  Pulse: 93 (841)  Resp: 17 (841)  BP: 146/84 (841)  SpO2: 99 % (841)      Extra trachs at bedside: 6, 8 cuff  Level of Consciousness: Level of Consciousness (AVPU): responds to voice  Respiratory Effort: Respiratory Effort: Unlabored Expansion/Accessory Muscle Usage: Expansion/Accessory Muscles/Retractions: no use of accessory muscles  All Lung Field Breath Sounds: All Lung Fields Breath Sounds: Anterior:, Lateral:, diminished  JOSE Breath Sounds: clear  LLL Breath Sounds: clear, diminished  RUL Breath Sounds: clear  RML Breath Sounds: clear, diminished  RLL Breath Sounds: clear, diminished  O2 Device/Concentration: 5L 28% TC  Surgical airway: Yes, Type: Shiley Size: 8, cuffed  Ambu at bedside:       Active Orders   Respiratory Care    Inhalation Treatment Q4H PRN     Frequency: Q4H PRN     Number of Occurrences: Until Specified    Oxygen Continuous     Frequency: Continuous     " Number of Occurrences: Until Specified     Order Questions:      Device type: Low flow      Device: Trach Collar      FiO2%: 40      Titrate O2 per Oxygen Titration Protocol: Yes      To maintain SpO2 goal of: >= 90%      Notify MD of: Inability to achieve desired SpO2; Sudden change in patient status and requires 20% increase in FiO2; Patient requires >60% FiO2    Pulse Oximetry Continuous     Frequency: Continuous     Number of Occurrences: Until Specified    Routine tracheostomy care     Frequency: BID     Number of Occurrences: Until Specified    SUCTION Q4H     Frequency: Q4H     Number of Occurrences: Until Specified       RECOMMENDATIONS    We recommend: RRT Recs: Continue POC per primary team.      FOLLOW-UP    Please call back the Rapid Response RT, Nayeli Tiwari, RRT at x [phone] for any questions or concerns.

## 2024-03-04 NOTE — ASSESSMENT & PLAN NOTE
Lab Results   Component Value Date    CREATININE 3.0 (H) 03/04/2024     Avoid insulin stacking  Titrate insulin slowly

## 2024-03-04 NOTE — PROGRESS NOTES
[facility]  Nephrology  Progress Note    Patient Name: Sarah Saravia  MRN: [id number]  Admission Date: 1/29/2024  Hospital Length of Stay: 35 days  Attending Provider: Steve Cole MD   Primary Care Physician: [facility]  Principal Problem:Ayaz's gangrene    Subjective:     HPI: Sarah Saravia is a 53 year old female with a past medical history of obesity (BMI 53) admitted with N/V and R groin wound found to be in DKA at OSH.  She underwent a CT abdomen and pelvis that showed extensive soft tissue air throughout the right groin and inguinal region and extending to involve the right lower quadrant anterior abdominal wall with extensive soft tissue air also seen involving the proximal medial aspect of the right thigh, concerning for gas-forming infection. She is s/p OR debridement for necrotizing fascitis on 1/29/24 and take back for 1/31/24. Right groin tissue growing proteus, susceptibilities still pending. Currently on meropenem and clindamycin which was d/c'd on 1/31/24. While at OSH pt developed acute respiratory failure requiring intubation. Nephrology was consulted for worsening alvarado in the setting of lactic acidosis and septic shock likely ATN, sCr elevated at 3.8 on admit.  OR today for debridement with possible closure and wound vac placement. Last HD 2/1. Net -1.3L. Electrolytes stable. Nephrology consulted for ALVARADO.         Interval History:     No acute events overnight. Patient mentation unchanged. Scr down to ~3 today; K is 6; BUN ~100.     Review of patient's allergies indicates:  No Known Allergies  Current Facility-Administered Medications   Medication Frequency    0.9%  NaCl infusion PRN    acetaminophen tablet 650 mg Q4H PRN    albuterol-ipratropium 2.5 mg-0.5 mg/3 mL nebulizer solution 3 mL Q4H PRN    amLODIPine tablet 10 mg Daily    carvediloL tablet 25 mg BID    dextrose 10% bolus 125 mL 125 mL PRN    dextrose 10% bolus 250 mL 250 mL PRN    glucagon (human  recombinant) injection 1 mg PRN    glucose chewable tablet 16 g PRN    glucose chewable tablet 24 g PRN    heparin (porcine) injection 7,500 Units Q8H    hydrALAZINE tablet 25 mg Q8H PRN    insulin aspart U-100 pen 0-10 Units Q4H PRN    insulin aspart U-100 pen 8 Units Q4H    insulin detemir U-100 (Levemir) pen 14 Units Daily    labetalol 20 mg/4 mL (5 mg/mL) IV syring Q6H PRN    naloxone 0.4 mg/mL injection 0.02 mg PRN    ondansetron injection 4 mg Q6H PRN    prochlorperazine injection Soln 5 mg Q6H PRN    psyllium husk (aspartame) 3.4 gram packet 1 packet Daily    sevelamer carbonate pwpk 1.6 g TID    sodium chloride 0.9% bolus 250 mL 250 mL PRN    sodium chloride 0.9% flush 10 mL PRN       Objective:     Vital Signs (Most Recent):  Temp: 97.7 °F (36.5 °C) (03/04/24 1122)  Pulse: 92 (03/04/24 1220)  Resp: 17 (03/04/24 1220)  BP: (!) 144/77 (03/04/24 1122)  SpO2: 96 % (03/04/24 1220) Vital Signs (24h Range):  Temp:  [97.7 °F (36.5 °C)-100.2 °F (37.9 °C)] 97.7 °F (36.5 °C)  Pulse:  [89-99] 92  Resp:  [16-20] 17  SpO2:  [96 %-100 %] 96 %  BP: (144-171)/(73-89) 144/77     Weight: (!) 140.2 kg (309 lb) (02/29/24 0300)  Body mass index is 51.42 kg/m².  Body surface area is 2.54 meters squared.    I/O last 3 completed shifts:  In: 1763 [NG/GT:1763]  Out: 2325 [Urine:2125; Stool:200]     Physical Exam  Vitals and nursing note reviewed.   Constitutional:       General: She is not in acute distress.     Appearance: She is ill-appearing.   HENT:      Head: Normocephalic and atraumatic.      Mouth/Throat:      Mouth: Mucous membranes are moist.      Pharynx: Oropharynx is clear.   Eyes:      Comments: Not opening eyes to command    Cardiovascular:      Rate and Rhythm: Normal rate.      Comments: Permacath in place, clean/dry  Pulmonary:      Effort: Pulmonary effort is normal.      Comments: trached    Abdominal:      General: There is no distension.      Palpations: Abdomen is soft.      Tenderness: There is no abdominal  tenderness.      Comments: PEG in place   Genitourinary:     Comments: Glynn in place    Skin:     General: Skin is warm and dry.      Comments: Wound vac in place to R thigh/groin to suction  Re approximated skin clean dry and intact   Neurological:      Comments: Occasionally withdraws from painful stimuli. No significant purposeful activity           Significant Labs:  All labs within the past 24 hours have been reviewed.     Significant Imaging:  Labs: Reviewed  Assessment/Plan:     Neuro  Cerebral infarction  Per primary team.     Renal/  * Ayaz's gangrene  - management per primary     ALVARADO (acute kidney injury)  Transfer from [facility]. Admitted for fourniers gangrene. Initiated HD on 1/31. ALVARADO 2/2 ATN in setting of septic shock. Arrived with CR 3.8. Unknown baseline. S/P OR debridement with possible closure and wound vac placement     ALVARADO most likley ATN in setting of septic shock; ATN recovering.   UOP:     Intake/Output Summary (Last 24 hours) at 3/4/2024 1338  Last data filed at 3/4/2024 1335  Gross per 24 hour   Intake 2153 ml   Output 1900 ml   Net 253 ml       Plan/Recommendation  - Will give 1L normal saline and IV lasix 40mg one time dose.   - start sodium bicarb 1300mg BID. (Order placed).   - suspect azotemia 2/2 to increase protein intake from tube feeds. Recommend change to ~0.6/kg of protein in tube feeds.   - Will order repeat RFP, if remains with hyperkalemia will plan for iHD.   -Daily RFP   -Strict I&Os  -Avoid nephrotoxins, if possible         Thank you for your consult. I will follow-up with patient. Please contact us if you have any additional questions.    Case discussed with attending. Attestation to follow.       Antione Hazel DO  Nephrology  [facility]

## 2024-03-04 NOTE — PROGRESS NOTES
"[facility]  Endocrinology  Progress Note    Admit Date: 1/29/2024     Reason for Consult: Management of T2DM, Hyperglycemia     Surgical Procedure and Date: n/a    Diabetes diagnosis year: new onset     Home Diabetes Medications:  none per chart review and family present at bedside     Lab Results   Component Value Date    HGBA1C 10.4 (H) 01/30/2024       Diabetes Complications include:     Hyperglycemia, DKA at OSH     Complicating diabetes co morbidities:   Obesity       HPI:   Patient is a 53 y.o. female with a diagnosis of obesity (BMI 53) admitted with N/V and R groin wound found to be in DKA at OSH. 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 While at OSH pt developed acute respiratory failure requiring intubation. Pulmonology was consulted for ventilator management and critical illness. Nephrology was consulted for worsening vishal in the setting of lactic acidosis and septic shock likely ATN, sCr elevated at 3.8 on admit now 4.0 post HD. Pt being admitted to SICU for surgical critical care management. Immediate plans include hemodynamic stabilization, weaning of respiratory support, and RRT.  Endocrinology consulted for management of T2DM.                 Interval HPI:   Overnight events: Remains in [facility]. POD 11. BG at higher end of goal ranges on current SQ insulin regimen. Creatinine 3.0. Tube feeds at goal. Diet NPO    Eating:   NPO  Nausea: No  Hypoglycemia and intervention: No  Fever: No  TPN and/or TF: Yes  If yes, type of TF/TPN and rate: TF at 50 ml/hr     BP (!) 144/77 (BP Location: Right arm, Patient Position: Lying)   Pulse 92   Temp 97.7 °F (36.5 °C) (Axillary)   Resp 17   Ht 5' 5" (1.651 m)   Wt (!) 140.2 kg (309 lb)   LMP 01/01/2024 (Approximate) Comment: tubal ligation  SpO2 96%   BMI 51.42 kg/m²     Labs Reviewed and Include    Recent Labs   Lab 03/04/24  0616   *   CALCIUM 10.2 " "  ALBUMIN 2.7*   PROT 8.6*      K 6.0*   CO2 17*      *   CREATININE 3.0*   ALKPHOS 158*   ALT 25   AST 34   BILITOT 0.2     Lab Results   Component Value Date    WBC 10.88 03/04/2024    HGB 8.9 (L) 03/04/2024    HCT 27.6 (L) 03/04/2024    MCV 89 03/04/2024     (H) 03/04/2024     No results for input(s): "TSH", "FREET4" in the last 168 hours.  Lab Results   Component Value Date    HGBA1C 10.4 (H) 01/30/2024       Nutritional status:   Body mass index is 51.42 kg/m².  Lab Results   Component Value Date    ALBUMIN 2.7 (L) 03/04/2024    ALBUMIN 2.7 (L) 03/03/2024    ALBUMIN 2.6 (L) 03/02/2024     No results found for: "PREALBUMIN"    Estimated Creatinine Clearance: 30.9 mL/min (A) (based on SCr of 3 mg/dL (H)).    Accu-Checks  Recent Labs     03/03/24  0055 03/03/24  0501 03/03/24  0853 03/03/24  1211 03/03/24  1647 03/03/24  2017 03/04/24  0012 03/04/24  0347 03/04/24  0839 03/04/24  1119   POCTGLUCOSE 185* 191* 197* 208* 202* 199* 186* 171* 188* 193*       Current Medications and/or Treatments Impacting Glycemic Control  Immunotherapy:    Immunosuppressants       None          Steroids:   Hormones (From admission, onward)      None          Pressors:    Autonomic Drugs (From admission, onward)      None          Hyperglycemia/Diabetes Medications:   Antihyperglycemics (From admission, onward)      Start     Stop Route Frequency Ordered    03/04/24 1200  insulin aspart U-100 pen 8 Units         -- SubQ Every 4 hours 03/04/24 0915    02/27/24 1354  insulin aspart U-100 pen 0-10 Units         -- SubQ Every 4 hours PRN 02/27/24 1255    02/13/24 0915  insulin detemir U-100 (Levemir) pen 14 Units         -- SubQ Daily 02/13/24 0906            ASSESSMENT and PLAN    Renal/  * Ayaz's gangrene  Managed per primary team  Optimize BG control        ALVARADO (acute kidney injury)  Lab Results   Component Value Date    CREATININE 3.0 (H) 03/04/2024     Avoid insulin stacking  Titrate insulin slowly "       Endocrine  Morbid (severe) obesity due to excess calories  Body mass index is 51.42 kg/m².  May increase insulin resistance.         New onset type 2 diabetes mellitus  BG goal 140-180  No previous hx of T2DM per family at bedside. A1c of 10.4. DKA at OSH. On TF. BG stable on regimen.      Plan:  - Continue Levemir 14 units daily.  - Increase Novolog to 8 units q 4 hrs while on tube feeding (HOLD if tube feeding is stopped or BG < 100) increased based on prn SQ insulin requirements.   - Continue Moderate Dose Correction Scale  - BG monitoring q 4 hrs while NPO /TF    ** Please call Endocrine for any BG related issues **      Discharge plans: TBD    Lab Results   Component Value Date    HGBA1C 10.4 (H) 01/30/2024             Zoie Muñiz NP  Endocrinology  [facility]

## 2024-03-04 NOTE — ASSESSMENT & PLAN NOTE
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 Unfortunately, has multiple infarcts on MRI brain with unchanged neuro status, however, family would like to proceed with all measures. S/p trach, peg, and lower wound closure, replacement of wound vac in right groin on 2/22/24. IR placement of HD line 2/29     - Wound vac to be changed 2x/week. Last changed 2/29 with wound care. Due for change today   - HD per nephro. S/p permacath placement 2/29  - Continue TF at 50cc/hr  - LP 2/16 - NGTD  - ID consulted for urine cultures   - UA growing Burkholderia species and Chryseibacterium Indologenes,  IV zosyn completed 2/22  - Palliative following, full code  - Stable on trach collar  - Daily labs  - Replete lytes PRN  - DVT ppx (SCDs and heparin)  - Endocrine following for hyperglycemia   - Scheduled aspart and levemir with PRN SSI      Dispo: LTAC placement, now medically stable for transfer

## 2024-03-04 NOTE — SUBJECTIVE & OBJECTIVE
"Interval HPI:   Overnight events: Remains in [facility]. POD 11. BG at higher end of goal ranges on current SQ insulin regimen. Creatinine 3.0. Tube feeds at goal. Diet NPO    Eating:   NPO  Nausea: No  Hypoglycemia and intervention: No  Fever: No  TPN and/or TF: Yes  If yes, type of TF/TPN and rate: TF at 50 ml/hr     BP (!) 144/77 (BP Location: Right arm, Patient Position: Lying)   Pulse 92   Temp 97.7 °F (36.5 °C) (Axillary)   Resp 17   Ht 5' 5" (1.651 m)   Wt (!) 140.2 kg (309 lb)   LMP 01/01/2024 (Approximate) Comment: tubal ligation  SpO2 96%   BMI 51.42 kg/m²     Labs Reviewed and Include    Recent Labs   Lab 03/04/24  0616   *   CALCIUM 10.2   ALBUMIN 2.7*   PROT 8.6*      K 6.0*   CO2 17*      *   CREATININE 3.0*   ALKPHOS 158*   ALT 25   AST 34   BILITOT 0.2     Lab Results   Component Value Date    WBC 10.88 03/04/2024    HGB 8.9 (L) 03/04/2024    HCT 27.6 (L) 03/04/2024    MCV 89 03/04/2024     (H) 03/04/2024     No results for input(s): "TSH", "FREET4" in the last 168 hours.  Lab Results   Component Value Date    HGBA1C 10.4 (H) 01/30/2024       Nutritional status:   Body mass index is 51.42 kg/m².  Lab Results   Component Value Date    ALBUMIN 2.7 (L) 03/04/2024    ALBUMIN 2.7 (L) 03/03/2024    ALBUMIN 2.6 (L) 03/02/2024     No results found for: "PREALBUMIN"    Estimated Creatinine Clearance: 30.9 mL/min (A) (based on SCr of 3 mg/dL (H)).    Accu-Checks  Recent Labs     03/03/24  0055 03/03/24  0501 03/03/24  0853 03/03/24  1211 03/03/24  1647 03/03/24  2017 03/04/24  0012 03/04/24  0347 03/04/24  0839 03/04/24  1119   POCTGLUCOSE 185* 191* 197* 208* 202* 199* 186* 171* 188* 193*       Current Medications and/or Treatments Impacting Glycemic Control  Immunotherapy:    Immunosuppressants       None          Steroids:   Hormones (From admission, onward)      None          Pressors:    Autonomic Drugs (From admission, onward)      None          Hyperglycemia/Diabetes " Medications:   Antihyperglycemics (From admission, onward)      Start     Stop Route Frequency Ordered    03/04/24 1200  insulin aspart U-100 pen 8 Units         -- SubQ Every 4 hours 03/04/24 0915    02/27/24 1354  insulin aspart U-100 pen 0-10 Units         -- SubQ Every 4 hours PRN 02/27/24 1255    02/13/24 0915  insulin detemir U-100 (Levemir) pen 14 Units         -- SubQ Daily 02/13/24 0906

## 2024-03-04 NOTE — PROGRESS NOTES
[facility]  General Surgery  Progress Note    Subjective:     History of Present Illness:  53 year old morbidly obese female who does not routinely go to the doctor presents to the ED with malaise, nausea, vomiting, and groin, buttock, perineal swelling and tenderness. She began feeling ill a few days ago.     On arrival to the ED she is tachycardic to 120, hypertensive without fever. Labs demonstrate leukocytosis of 21, , with lactic acidosis and associated ALVARADO with cr 3.8, hyperglycemia with blood glucose of 824 accompanied by severe electrolyte derangements. CT imaging obtained demonstrates diffuse subcutaneous air along buttocks, perineum, anterior vulva, as well as bilateral thighs.     No prior abdominal surgeries. She denies problems with her heart or lungs. Non smoker. Does not routinely take any medications.    Post-Op Info:  Procedure(s) (LRB):  CREATION, TRACHEOSTOMY (N/A)  EGD, WITH PEG TUBE INSERTION (N/A)  CLOSURE, WOUND (Right)  REPLACEMENT, WOUND VAC (Right)   11 Days Post-Op     Interval History: NAEON. Afebrile. HDS. On trach collar. TF at 50cc/hr. AM labs pending    Medications:  Continuous Infusions:  Scheduled Meds:   sodium chloride 0.9%   Intravenous Once    amLODIPine  10 mg Per G Tube Daily    carvediloL  25 mg Per G Tube BID    heparin (porcine)  7,500 Units Subcutaneous Q8H    insulin aspart U-100  7 Units Subcutaneous Q4H    insulin detemir U-100  14 Units Subcutaneous Daily    psyllium husk (aspartame)  1 packet Per G Tube Daily    sevelamer carbonate  1.6 g Per G Tube TID     PRN Meds:sodium chloride 0.9%, acetaminophen, albuterol-ipratropium, dextrose 10%, dextrose 10%, glucagon (human recombinant), glucose, glucose, hydrALAZINE, insulin aspart U-100, labetalol, naloxone, ondansetron, prochlorperazine, sodium chloride 0.9%, sodium chloride 0.9%     Review of patient's allergies indicates:  No Known Allergies  Objective:     Vital Signs (Most Recent):  Temp: 100 °F (37.8  °C) (03/04/24 0351)  Pulse: 96 (03/04/24 0430)  Resp: 20 (03/04/24 0430)  BP: (!) 154/74 (03/04/24 0351)  SpO2: 99 % (03/04/24 0430) Vital Signs (24h Range):  Temp:  [98.4 °F (36.9 °C)-100.2 °F (37.9 °C)] 100 °F (37.8 °C)  Pulse:  [88-99] 96  Resp:  [16-20] 20  SpO2:  [96 %-100 %] 99 %  BP: (128-171)/(73-89) 154/74     Weight: (!) 140.2 kg (309 lb)  Body mass index is 51.42 kg/m².    Intake/Output - Last 3 Shifts         03/02 0700  03/03 0659 03/03 0700  03/04 0659 03/04 0700  03/05 0659    P.O.  0     NG/ 433     Total Intake(mL/kg) 750 (5.3) 433 (3.1)     Urine (mL/kg/hr) 1425 (0.4) 1325 (0.4)     Drains 0      Other 0 0     Stool 200 100     Total Output 1625 1425     Net -871 -992                    Physical Exam  Vitals and nursing note reviewed.   Constitutional:       General: She is not in acute distress.     Appearance: She is ill-appearing.   HENT:      Head: Normocephalic and atraumatic.      Mouth/Throat:      Mouth: Mucous membranes are moist.      Pharynx: Oropharynx is clear.   Eyes:      Comments: Not opening eyes to command    Cardiovascular:      Rate and Rhythm: Normal rate.      Comments: Permacath in place, clean/dry  Pulmonary:      Effort: Pulmonary effort is normal.      Comments: trached    Abdominal:      General: There is no distension.      Palpations: Abdomen is soft.      Tenderness: There is no abdominal tenderness.      Comments: PEG in place   Genitourinary:     Comments: Glynn in place    Skin:     General: Skin is warm and dry.      Comments: Wound vac in place to R thigh/groin to suction  Re approximated skin clean dry and intact   Neurological:      Comments: Occasionally withdraws from painful stimuli. No significant purposeful activity           Significant Labs:  I have reviewed all pertinent lab results within the past 24 hours.    Significant Diagnostics:  I have reviewed all pertinent imaging results/findings within the past 24 hours.  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 Unfortunately, has multiple infarcts on MRI brain with unchanged neuro status, however, family would like to proceed with all measures. S/p trach, peg, and lower wound closure, replacement of wound vac in right groin on 2/22/24. IR placement of HD line 2/29     - Wound vac to be changed 2x/week. Last changed 2/29 with wound care. Due for change today   - HD per nephro. S/p permacath placement 2/29  - Continue TF at 50cc/hr  - LP 2/16 - NGTD  - ID consulted for urine cultures   - UA growing Burkholderia species and Chryseibacterium Indologenes,  IV zosyn completed 2/22  - Palliative following, full code  - Stable on trach collar  - Daily labs  - Replete lytes PRN  - DVT ppx (SCDs and heparin)  - Endocrine following for hyperglycemia   - Scheduled aspart and levemir with PRN SSI      Dispo: LTAC placement, now medically stable for transfer      Case discussed with Dr. Cole.    Braxton Calhoun PACarolC  General Surgery  [facility]

## 2024-03-05 VITALS
BODY MASS INDEX: 48.82 KG/M2 | OXYGEN SATURATION: 99 % | RESPIRATION RATE: 18 BRPM | HEIGHT: 65 IN | WEIGHT: 293 LBS | DIASTOLIC BLOOD PRESSURE: 77 MMHG | TEMPERATURE: 99 F | HEART RATE: 87 BPM | SYSTOLIC BLOOD PRESSURE: 138 MMHG

## 2024-03-05 PROBLEM — R13.12 OROPHARYNGEAL DYSPHAGIA: Status: ACTIVE | Noted: 2024-03-05

## 2024-03-05 PROBLEM — D75.839 THROMBOCYTOSIS: Status: ACTIVE | Noted: 2024-03-05

## 2024-03-05 LAB
ALBUMIN SERPL BCP-MCNC: 2.8 G/DL (ref 3.5–5.2)
ALP SERPL-CCNC: 153 U/L (ref 55–135)
ALT SERPL W/O P-5'-P-CCNC: 30 U/L (ref 10–44)
ANION GAP SERPL CALC-SCNC: 14 MMOL/L (ref 8–16)
AST SERPL-CCNC: 37 U/L (ref 10–40)
BASOPHILS # BLD AUTO: 0.04 K/UL (ref 0–0.2)
BASOPHILS NFR BLD: 0.4 % (ref 0–1.9)
BILIRUB SERPL-MCNC: 0.2 MG/DL (ref 0.1–1)
BUN SERPL-MCNC: 116 MG/DL (ref 6–20)
CALCIUM SERPL-MCNC: 10.1 MG/DL (ref 8.7–10.5)
CHLORIDE SERPL-SCNC: 106 MMOL/L (ref 95–110)
CO2 SERPL-SCNC: 20 MMOL/L (ref 23–29)
CREAT SERPL-MCNC: 2.6 MG/DL (ref 0.5–1.4)
DIFFERENTIAL METHOD BLD: ABNORMAL
EOSINOPHIL # BLD AUTO: 0.7 K/UL (ref 0–0.5)
EOSINOPHIL NFR BLD: 7 % (ref 0–8)
ERYTHROCYTE [DISTWIDTH] IN BLOOD BY AUTOMATED COUNT: 16.3 % (ref 11.5–14.5)
EST. GFR  (NO RACE VARIABLE): 21.4 ML/MIN/1.73 M^2
GLUCOSE SERPL-MCNC: 176 MG/DL (ref 70–110)
HCT VFR BLD AUTO: 28.1 % (ref 37–48.5)
HGB BLD-MCNC: 8.9 G/DL (ref 12–16)
IMM GRANULOCYTES # BLD AUTO: 0.05 K/UL (ref 0–0.04)
IMM GRANULOCYTES NFR BLD AUTO: 0.5 % (ref 0–0.5)
LYMPHOCYTES # BLD AUTO: 2.5 K/UL (ref 1–4.8)
LYMPHOCYTES NFR BLD: 23.6 % (ref 18–48)
MAGNESIUM SERPL-MCNC: 2.1 MG/DL (ref 1.6–2.6)
MCH RBC QN AUTO: 28.8 PG (ref 27–31)
MCHC RBC AUTO-ENTMCNC: 31.7 G/DL (ref 32–36)
MCV RBC AUTO: 91 FL (ref 82–98)
MONOCYTES # BLD AUTO: 1.1 K/UL (ref 0.3–1)
MONOCYTES NFR BLD: 10.2 % (ref 4–15)
NEUTROPHILS # BLD AUTO: 6.1 K/UL (ref 1.8–7.7)
NEUTROPHILS NFR BLD: 58.3 % (ref 38–73)
NRBC BLD-RTO: 0 /100 WBC
PHOSPHATE SERPL-MCNC: 5.6 MG/DL (ref 2.7–4.5)
PLATELET # BLD AUTO: 521 K/UL (ref 150–450)
PMV BLD AUTO: 9.9 FL (ref 9.2–12.9)
POCT GLUCOSE: 174 MG/DL (ref 70–110)
POCT GLUCOSE: 190 MG/DL (ref 70–110)
POCT GLUCOSE: 197 MG/DL (ref 70–110)
POCT GLUCOSE: 202 MG/DL (ref 70–110)
POTASSIUM SERPL-SCNC: 4.9 MMOL/L (ref 3.5–5.1)
PROT SERPL-MCNC: 8.9 G/DL (ref 6–8.4)
RBC # BLD AUTO: 3.09 M/UL (ref 4–5.4)
SODIUM SERPL-SCNC: 140 MMOL/L (ref 136–145)
WBC # BLD AUTO: 10.47 K/UL (ref 3.9–12.7)

## 2024-03-05 PROCEDURE — 99900026 HC AIRWAY MAINTENANCE (STAT)

## 2024-03-05 PROCEDURE — 83735 ASSAY OF MAGNESIUM: CPT

## 2024-03-05 PROCEDURE — 80053 COMPREHEN METABOLIC PANEL: CPT

## 2024-03-05 PROCEDURE — 94761 N-INVAS EAR/PLS OXIMETRY MLT: CPT

## 2024-03-05 PROCEDURE — 25000003 PHARM REV CODE 250

## 2024-03-05 PROCEDURE — 85025 COMPLETE CBC W/AUTO DIFF WBC: CPT

## 2024-03-05 PROCEDURE — 25000003 PHARM REV CODE 250: Performed by: STUDENT IN AN ORGANIZED HEALTH CARE EDUCATION/TRAINING PROGRAM

## 2024-03-05 PROCEDURE — 84100 ASSAY OF PHOSPHORUS: CPT

## 2024-03-05 PROCEDURE — 63600175 PHARM REV CODE 636 W HCPCS

## 2024-03-05 PROCEDURE — 25000242 PHARM REV CODE 250 ALT 637 W/ HCPCS

## 2024-03-05 PROCEDURE — 99900035 HC TECH TIME PER 15 MIN (STAT)

## 2024-03-05 PROCEDURE — 27000221 HC OXYGEN, UP TO 24 HOURS

## 2024-03-05 PROCEDURE — 36415 COLL VENOUS BLD VENIPUNCTURE: CPT

## 2024-03-05 PROCEDURE — 99233 SBSQ HOSP IP/OBS HIGH 50: CPT | Mod: ,,, | Performed by: INTERNAL MEDICINE

## 2024-03-05 PROCEDURE — 63600175 PHARM REV CODE 636 W HCPCS: Performed by: STUDENT IN AN ORGANIZED HEALTH CARE EDUCATION/TRAINING PROGRAM

## 2024-03-05 PROCEDURE — 99232 SBSQ HOSP IP/OBS MODERATE 35: CPT | Mod: ,,, | Performed by: NURSE PRACTITIONER

## 2024-03-05 RX ORDER — HEPARIN SODIUM (PORCINE) LOCK FLUSH IV SOLN 100 UNIT/ML 100 UNIT/ML
100 SOLUTION INTRAVENOUS ONCE
Status: DISCONTINUED | OUTPATIENT
Start: 2024-03-05 | End: 2024-03-05

## 2024-03-05 RX ORDER — HEPARIN 100 UNIT/ML
100 SYRINGE INTRAVENOUS
Status: DISCONTINUED | OUTPATIENT
Start: 2024-03-05 | End: 2024-03-05 | Stop reason: HOSPADM

## 2024-03-05 RX ORDER — INSULIN ASPART 100 [IU]/ML
9 INJECTION, SOLUTION INTRAVENOUS; SUBCUTANEOUS EVERY 4 HOURS
Status: DISCONTINUED | OUTPATIENT
Start: 2024-03-05 | End: 2024-03-05 | Stop reason: HOSPADM

## 2024-03-05 RX ADMIN — INSULIN ASPART 2 UNITS: 100 INJECTION, SOLUTION INTRAVENOUS; SUBCUTANEOUS at 12:03

## 2024-03-05 RX ADMIN — CARVEDILOL 25 MG: 25 TABLET, FILM COATED ORAL at 08:03

## 2024-03-05 RX ADMIN — AMLODIPINE BESYLATE 10 MG: 10 TABLET ORAL at 08:03

## 2024-03-05 RX ADMIN — PSYLLIUM HUSK 1 PACKET: 3.4 POWDER ORAL at 09:03

## 2024-03-05 RX ADMIN — INSULIN ASPART 8 UNITS: 100 INJECTION, SOLUTION INTRAVENOUS; SUBCUTANEOUS at 04:03

## 2024-03-05 RX ADMIN — SODIUM BICARBONATE 650 MG TABLET 1300 MG: at 08:03

## 2024-03-05 RX ADMIN — INSULIN ASPART 8 UNITS: 100 INJECTION, SOLUTION INTRAVENOUS; SUBCUTANEOUS at 12:03

## 2024-03-05 RX ADMIN — HEPARIN 100 UNITS: 100 SYRINGE at 11:03

## 2024-03-05 RX ADMIN — INSULIN DETEMIR 14 UNITS: 100 INJECTION, SOLUTION SUBCUTANEOUS at 08:03

## 2024-03-05 RX ADMIN — INSULIN ASPART 4 UNITS: 100 INJECTION, SOLUTION INTRAVENOUS; SUBCUTANEOUS at 08:03

## 2024-03-05 RX ADMIN — HEPARIN SODIUM 7500 UNITS: 5000 INJECTION INTRAVENOUS; SUBCUTANEOUS at 06:03

## 2024-03-05 RX ADMIN — INSULIN ASPART 8 UNITS: 100 INJECTION, SOLUTION INTRAVENOUS; SUBCUTANEOUS at 08:03

## 2024-03-05 RX ADMIN — SEVELAMER CARBONATE 1.6 G: 800 POWDER, FOR SUSPENSION ORAL at 08:03

## 2024-03-05 NOTE — ASSESSMENT & PLAN NOTE
Transfer from [facility]. Admitted for fourniers gangrene. Initiated HD on 1/31. ALVARADO 2/2 ATN in setting of septic shock. Arrived with CR 3.8. Unknown baseline. S/P OR debridement with possible closure and wound vac placement     ALVARADO most likley ATN in setting of septic shock; ATN recovering.   UOP:     Intake/Output Summary (Last 24 hours) at 3/5/2024 1346  Last data filed at 3/5/2024 0839  Gross per 24 hour   Intake 1101 ml   Output 1750 ml   Net -649 ml       Plan/Recommendation  - Please consult IR for perm cath removal no plans for RRT as patient showing signs of renal recovery.   - suspect azotemia 2/2 to increase protein intake from tube feeds. Recommend change to ~0.8/kg of protein in tube feeds.   -Daily RFP   -Strict I&Os  -Avoid nephrotoxins, if possible

## 2024-03-05 NOTE — ASSESSMENT & PLAN NOTE
BG goal 140-180  No previous hx of T2DM per family at bedside. A1c of 10.4. DKA at OSH. On TF. BG stable on regimen.      Plan:  - Continue Levemir 14 units daily.  - Increase Novolog to 9 units q 4 hrs while on tube feeding (HOLD if tube feeding is stopped or BG < 100) increased based on prn SQ insulin requirements.   - Continue Moderate Dose Correction Scale  - BG monitoring q 4 hrs while NPO /TF    ** Please call Endocrine for any BG related issues **      Discharge plans: TBD    Lab Results   Component Value Date    HGBA1C 10.4 (H) 01/30/2024

## 2024-03-05 NOTE — ASSESSMENT & PLAN NOTE
Lab Results   Component Value Date    CREATININE 2.6 (H) 03/05/2024     Avoid insulin stacking  Titrate insulin slowly

## 2024-03-05 NOTE — SUBJECTIVE & OBJECTIVE
Interval History:     No acute events overnight. Patient mentation remains unchanged. Scr downtrending, electrolytes stable. Remains with azotemia. UOPO stable.     Review of patient's allergies indicates:  No Known Allergies  Current Facility-Administered Medications   Medication Frequency    0.9%  NaCl infusion PRN    acetaminophen tablet 650 mg Q4H PRN    albuterol-ipratropium 2.5 mg-0.5 mg/3 mL nebulizer solution 3 mL Q4H PRN    amLODIPine tablet 10 mg Daily    carvediloL tablet 25 mg BID    dextrose 10% bolus 125 mL 125 mL PRN    dextrose 10% bolus 250 mL 250 mL PRN    glucagon (human recombinant) injection 1 mg PRN    glucose chewable tablet 16 g PRN    glucose chewable tablet 24 g PRN    heparin (porcine) injection 7,500 Units Q8H    heparin, porcine (PF) 100 unit/mL injection flush 100 Units PRN    hydrALAZINE tablet 25 mg Q8H PRN    insulin aspart U-100 pen 0-10 Units Q4H PRN    insulin aspart U-100 pen 8 Units Q4H    insulin detemir U-100 (Levemir) pen 14 Units Daily    labetalol 20 mg/4 mL (5 mg/mL) IV syring Q6H PRN    naloxone 0.4 mg/mL injection 0.02 mg PRN    ondansetron injection 4 mg Q6H PRN    prochlorperazine injection Soln 5 mg Q6H PRN    psyllium husk (aspartame) 3.4 gram packet 1 packet Daily    sevelamer carbonate pwpk 1.6 g TID    sodium bicarbonate tablet 1,300 mg BID    sodium chloride 0.9% bolus 250 mL 250 mL PRN    sodium chloride 0.9% flush 10 mL PRN       Objective:     Vital Signs (Most Recent):  Temp: 98.7 °F (37.1 °C) (03/05/24 1124)  Pulse: 89 (03/05/24 1124)  Resp: 18 (03/05/24 1124)  BP: 138/77 (03/05/24 1124)  SpO2: 100 % (03/05/24 1124) Vital Signs (24h Range):  Temp:  [98 °F (36.7 °C)-99.5 °F (37.5 °C)] 98.7 °F (37.1 °C)  Pulse:  [78-96] 89  Resp:  [18-19] 18  SpO2:  [94 %-100 %] 100 %  BP: (133-178)/(69-88) 138/77     Weight: (!) 140.2 kg (309 lb) (02/29/24 0300)  Body mass index is 51.42 kg/m².  Body surface area is 2.54 meters squared.    I/O last 3 completed shifts:  In:  2761 [NG/GT:1771; IV Piggyback:990]  Out: 2925 [Urine:2675; Other:100; Stool:150]     Physical Exam  Vitals and nursing note reviewed.   Constitutional:       General: She is not in acute distress.     Appearance: She is ill-appearing.   HENT:      Head: Normocephalic and atraumatic.      Mouth/Throat:      Mouth: Mucous membranes are moist.      Pharynx: Oropharynx is clear.   Eyes:      Comments: Not opening eyes to command    Cardiovascular:      Rate and Rhythm: Normal rate.      Comments: Permacath in place, clean/dry  Pulmonary:      Effort: Pulmonary effort is normal.      Comments: trached    Abdominal:      General: There is no distension.      Palpations: Abdomen is soft.      Tenderness: There is no abdominal tenderness.      Comments: PEG in place   Genitourinary:     Comments: Glynn in place    Skin:     General: Skin is warm and dry.      Comments: Wound vac in place to R thigh/groin to suction  Re approximated skin clean dry and intact   Neurological:      Comments: Occasionally withdraws from painful stimuli. No significant purposeful activity           Significant Labs:  All labs within the past 24 hours have been reviewed.     Significant Imaging:  Labs: Reviewed

## 2024-03-05 NOTE — PLAN OF CARE
[facility]  Discharge Final Note    Primary Care Provider: [facility]    Expected Discharge Date: 3/5/2024    Final Discharge Note (most recent)       Final Note - 03/05/24 1310          Final Note    Assessment Type Final Discharge Note     Anticipated Discharge Disposition [facility] Resources/Appts/Education Provided Provided patient/caregiver with written discharge plan information        Post-Acute Status    Post-Acute Authorization Placement     Post-Acute Placement Status Set-up Complete/Auth obtained     Patient choice form signed by patient/caregiver List with quality metrics by geographic area provided     Discharge Delays None known at this time                     Important Message from Medicare           Pt to d.c to [facility] via stretcher. No additional needs at this time.    Tomasa Amezcua LCSW  Case Management/[facility]  [phone]

## 2024-03-05 NOTE — PROGRESS NOTES
[facility]  General Surgery  Progress Note    Subjective:     History of Present Illness:  53 year old morbidly obese female who does not routinely go to the doctor presents to the ED with malaise, nausea, vomiting, and groin, buttock, perineal swelling and tenderness. She began feeling ill a few days ago.     On arrival to the ED she is tachycardic to 120, hypertensive without fever. Labs demonstrate leukocytosis of 21, , with lactic acidosis and associated ALVARADO with cr 3.8, hyperglycemia with blood glucose of 824 accompanied by severe electrolyte derangements. CT imaging obtained demonstrates diffuse subcutaneous air along buttocks, perineum, anterior vulva, as well as bilateral thighs.     No prior abdominal surgeries. She denies problems with her heart or lungs. Non smoker. Does not routinely take any medications.    Post-Op Info:  Procedure(s) (LRB):  CREATION, TRACHEOSTOMY (N/A)  EGD, WITH PEG TUBE INSERTION (N/A)  CLOSURE, WOUND (Right)  REPLACEMENT, WOUND VAC (Right)   12 Days Post-Op     Interval History: NAEO. VSS. AF. Patient with minimal spontaneous movement. K stable this morning after shifting with diuresis. Wound chare changed vac yesterday without issue.     Medications:  Continuous Infusions:  Scheduled Meds:   sodium chloride 0.9%   Intravenous Once    amLODIPine  10 mg Per G Tube Daily    carvediloL  25 mg Per G Tube BID    heparin (porcine)  7,500 Units Subcutaneous Q8H    insulin aspart U-100  7 Units Subcutaneous Q4H    insulin detemir U-100  14 Units Subcutaneous Daily    psyllium husk (aspartame)  1 packet Per G Tube Daily    sevelamer carbonate  1.6 g Per G Tube TID     PRN Meds:sodium chloride 0.9%, acetaminophen, albuterol-ipratropium, dextrose 10%, dextrose 10%, glucagon (human recombinant), glucose, glucose, hydrALAZINE, insulin aspart U-100, labetalol, naloxone, ondansetron, prochlorperazine, sodium chloride 0.9%, sodium chloride 0.9%     Review of patient's allergies  indicates:  No Known Allergies  Objective:     Vital Signs (Most Recent):  Temp: 100 °F (37.8 °C) (03/04/24 0351)  Pulse: 91 (03/04/24 0659)  Resp: 20 (03/04/24 0430)  BP: (!) 154/74 (03/04/24 0351)  SpO2: 99 % (03/04/24 0530) Vital Signs (24h Range):  Temp:  [98.4 °F (36.9 °C)-100.2 °F (37.9 °C)] 100 °F (37.8 °C)  Pulse:  [88-99] 91  Resp:  [16-20] 20  SpO2:  [96 %-100 %] 99 %  BP: (128-171)/(73-89) 154/74     Weight: (!) 140.2 kg (309 lb)  Body mass index is 51.42 kg/m².    Intake/Output - Last 3 Shifts         03/02 0700  03/03 0659 03/03 0700 03/04 0659 03/04 0700 03/05 0659    P.O.  0     NG/ 1013     Total Intake(mL/kg) 750 (5.3) 1013 (7.2)     Urine (mL/kg/hr) 1425 (0.4) 1325 (0.4)     Drains 0      Other 0 0     Stool 200 100     Total Output 1625 1425     Net -875 -412                     Physical Exam  Vitals and nursing note reviewed.   Constitutional:       General: She is not in acute distress.     Appearance: She is ill-appearing.   HENT:      Head: Normocephalic and atraumatic.      Mouth/Throat:      Mouth: Mucous membranes are moist.      Pharynx: Oropharynx is clear.   Eyes:      Comments: Not opening eyes to command    Cardiovascular:      Rate and Rhythm: Normal rate.      Comments: Permacath in place, clean/dry  Pulmonary:      Effort: Pulmonary effort is normal.      Comments: trached    Abdominal:      General: There is no distension.      Palpations: Abdomen is soft.      Tenderness: There is no abdominal tenderness.      Comments: PEG in place   Genitourinary:     Comments: Glynn in place    Skin:     General: Skin is warm and dry.      Comments: Wound vac in place to R thigh/groin to suction  Re approximated skin clean dry and intact   Neurological:      Comments: Occasionally withdraws from painful stimuli. No significant purposeful activity           Significant Labs:  I have reviewed all pertinent lab results within the past 24 hours.  CBC:   Recent Labs   Lab 03/05/24  4703    WBC 10.47   RBC 3.09*   HGB 8.9*   HCT 28.1*   *   MCV 91   MCH 28.8   MCHC 31.7*     BMP:   Recent Labs   Lab 03/05/24  0529   *      K 4.9      CO2 20*   *   CREATININE 2.6*   CALCIUM 10.1   MG 2.1       Significant Diagnostics:  I have reviewed all pertinent imaging results/findings within the past 24 hours.  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 Unfortunately, has multiple infarcts on MRI brain with unchanged neuro status, however, family would like to proceed with all measures. S/p trach, peg, and lower wound closure, replacement of wound vac in right groin on 2/22/24. IR placement of HD line 2/29     - Wound vac to be changed 2x/week. Last changed 3/4 with wound care.  - HD per nephro. S/p permacath placement 2/29  - Continue TF at 50cc/hr  - awaiting nephrology plans for HD - can discharge if receives HD via perm-a-cath   - LP 2/16 - NGTD  - ID consulted for urine cultures   - UA growing Burkholderia species and Chryseibacterium Indologenes,  IV zosyn completed 2/22  - Palliative following, full code  - Stable on trach collar  - Daily labs  - Replete lytes PRN  - DVT ppx (SCDs and heparin)  - Endocrine following for hyperglycemia   - Scheduled aspart and levemir with PRN SSI      Dispo: LTAC placement, now medically stable for transfer        Celia Allison MD  General Surgery  [facility]

## 2024-03-05 NOTE — ASSESSMENT & PLAN NOTE
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 Unfortunately, has multiple infarcts on MRI brain with unchanged neuro status, however, family would like to proceed with all measures. S/p trach, peg, and lower wound closure, replacement of wound vac in right groin on 2/22/24. IR placement of HD line 2/29     - Wound vac to be changed 2x/week. Last changed 3/4 with wound care.  - HD per nephro. S/p permacath placement 2/29  - Continue TF at 50cc/hr  - awaiting nephrology plans for HD - can discharge if receives HD via perm-a-cath   - LP 2/16 - NGTD  - ID consulted for urine cultures   - UA growing Burkholderia species and Chryseibacterium Indologenes,  IV zosyn completed 2/22  - Palliative following, full code  - Stable on trach collar  - Daily labs  - Replete lytes PRN  - DVT ppx (SCDs and heparin)  - Endocrine following for hyperglycemia   - Scheduled aspart and levemir with PRN SSI      Dispo: LTAC placement, now medically stable for transfer

## 2024-03-05 NOTE — PROGRESS NOTES
[facility]  Nephrology  Progress Note    Patient Name: Sarah Saravia  MRN: [id number]  Admission Date: 1/29/2024  Hospital Length of Stay: 36 days  Attending Provider: Steve Cole MD   Primary Care Physician: Patricia [facility]  Principal Problem:Ayaz's gangrene    Subjective:     HPI: Sarah Saravia is a 53 year old female with a past medical history of obesity (BMI 53) admitted with N/V and R groin wound found to be in DKA at OSH.  She underwent a CT abdomen and pelvis that showed extensive soft tissue air throughout the right groin and inguinal region and extending to involve the right lower quadrant anterior abdominal wall with extensive soft tissue air also seen involving the proximal medial aspect of the right thigh, concerning for gas-forming infection. She is s/p OR debridement for necrotizing fascitis on 1/29/24 and take back for 1/31/24. Right groin tissue growing proteus, susceptibilities still pending. Currently on meropenem and clindamycin which was d/c'd on 1/31/24. While at OSH pt developed acute respiratory failure requiring intubation. Nephrology was consulted for worsening alvarado in the setting of lactic acidosis and septic shock likely ATN, sCr elevated at 3.8 on admit.  OR today for debridement with possible closure and wound vac placement. Last HD 2/1. Net -1.3L. Electrolytes stable. Nephrology consulted for ALVARADO.     Interval History:     No acute events overnight. Patient mentation remains unchanged. Scr downtrending, electrolytes stable. Remains with azotemia. UOPO stable.     Review of patient's allergies indicates:  No Known Allergies  Current Facility-Administered Medications   Medication Frequency    0.9%  NaCl infusion PRN    acetaminophen tablet 650 mg Q4H PRN    albuterol-ipratropium 2.5 mg-0.5 mg/3 mL nebulizer solution 3 mL Q4H PRN    amLODIPine tablet 10 mg Daily    carvediloL tablet 25 mg BID    dextrose 10% bolus 125 mL 125 mL PRN    dextrose 10%  bolus 250 mL 250 mL PRN    glucagon (human recombinant) injection 1 mg PRN    glucose chewable tablet 16 g PRN    glucose chewable tablet 24 g PRN    heparin (porcine) injection 7,500 Units Q8H    heparin, porcine (PF) 100 unit/mL injection flush 100 Units PRN    hydrALAZINE tablet 25 mg Q8H PRN    insulin aspart U-100 pen 0-10 Units Q4H PRN    insulin aspart U-100 pen 8 Units Q4H    insulin detemir U-100 (Levemir) pen 14 Units Daily    labetalol 20 mg/4 mL (5 mg/mL) IV syring Q6H PRN    naloxone 0.4 mg/mL injection 0.02 mg PRN    ondansetron injection 4 mg Q6H PRN    prochlorperazine injection Soln 5 mg Q6H PRN    psyllium husk (aspartame) 3.4 gram packet 1 packet Daily    sevelamer carbonate pwpk 1.6 g TID    sodium bicarbonate tablet 1,300 mg BID    sodium chloride 0.9% bolus 250 mL 250 mL PRN    sodium chloride 0.9% flush 10 mL PRN       Objective:     Vital Signs (Most Recent):  Temp: 98.7 °F (37.1 °C) (03/05/24 1124)  Pulse: 89 (03/05/24 1124)  Resp: 18 (03/05/24 1124)  BP: 138/77 (03/05/24 1124)  SpO2: 100 % (03/05/24 1124) Vital Signs (24h Range):  Temp:  [98 °F (36.7 °C)-99.5 °F (37.5 °C)] 98.7 °F (37.1 °C)  Pulse:  [78-96] 89  Resp:  [18-19] 18  SpO2:  [94 %-100 %] 100 %  BP: (133-178)/(69-88) 138/77     Weight: (!) 140.2 kg (309 lb) (02/29/24 0300)  Body mass index is 51.42 kg/m².  Body surface area is 2.54 meters squared.    I/O last 3 completed shifts:  In: 2761 [NG/GT:1771; IV Piggyback:990]  Out: 2925 [Urine:2675; Other:100; Stool:150]     Physical Exam  Vitals and nursing note reviewed.   Constitutional:       General: She is not in acute distress.     Appearance: She is ill-appearing.   HENT:      Head: Normocephalic and atraumatic.      Mouth/Throat:      Mouth: Mucous membranes are moist.      Pharynx: Oropharynx is clear.   Eyes:      Comments: Not opening eyes to command    Cardiovascular:      Rate and Rhythm: Normal rate.      Comments: Permacath in place, clean/dry  Pulmonary:      Effort:  Pulmonary effort is normal.      Comments: trached    Abdominal:      General: There is no distension.      Palpations: Abdomen is soft.      Tenderness: There is no abdominal tenderness.      Comments: PEG in place   Genitourinary:     Comments: Glynn in place    Skin:     General: Skin is warm and dry.      Comments: Wound vac in place to R thigh/groin to suction  Re approximated skin clean dry and intact   Neurological:      Comments: Occasionally withdraws from painful stimuli. No significant purposeful activity           Significant Labs:  All labs within the past 24 hours have been reviewed.     Significant Imaging:  Labs: Reviewed  Assessment/Plan:     Neuro  Cerebral infarction  Per primary team.     Renal/  * Ayaz's gangrene  - management per primary     ALVARADO (acute kidney injury)  Transfer from [facility]. Admitted for fourniers gangrene. Initiated HD on 1/31. ALVARADO 2/2 ATN in setting of septic shock. Arrived with CR 3.8. Unknown baseline. S/P OR debridement with possible closure and wound vac placement     ALVARADO most likley ATN in setting of septic shock; ATN recovering.   UOP:     Intake/Output Summary (Last 24 hours) at 3/5/2024 1346  Last data filed at 3/5/2024 0839  Gross per 24 hour   Intake 1101 ml   Output 1750 ml   Net -649 ml       Plan/Recommendation  - Please consult IR for perm cath removal no plans for RRT as patient showing signs of renal recovery.   - suspect azotemia 2/2 to increase protein intake from tube feeds. Recommend change to ~0.8/kg of protein in tube feeds.   -Daily RFP   -Strict I&Os  -Avoid nephrotoxins, if possible         Thank you for your consult. I will follow-up with patient. Please contact us if you have any additional questions.    Case discussed with attending. Attestation to follow.       Antione Hazel DO  Nephrology  [facility]

## 2024-03-05 NOTE — SUBJECTIVE & OBJECTIVE
"Interval HPI:   Overnight events: Remains in [facility]. POD 12. BG at higher end of goal ranges on current SQ insulin regimen. Tube feeds at 50 ml/hr Diet NPO    Eating:   NPO  Nausea: No  Hypoglycemia and intervention: No  Fever: No  TPN and/or TF: Yes  If yes, type of TF/TPN and rate: TFs at 50 ml/hr     BP (!) 178/85 (BP Location: Right arm, Patient Position: Lying)   Pulse 86   Temp 98 °F (36.7 °C) (Oral)   Resp 18   Ht 5' 5" (1.651 m)   Wt (!) 140.2 kg (309 lb)   LMP 01/01/2024 (Approximate) Comment: tubal ligation  SpO2 99%   BMI 51.42 kg/m²     Labs Reviewed and Include    Recent Labs   Lab 03/05/24  0529   *   CALCIUM 10.1   ALBUMIN 2.8*   PROT 8.9*      K 4.9   CO2 20*      *   CREATININE 2.6*   ALKPHOS 153*   ALT 30   AST 37   BILITOT 0.2     Lab Results   Component Value Date    WBC 10.47 03/05/2024    HGB 8.9 (L) 03/05/2024    HCT 28.1 (L) 03/05/2024    MCV 91 03/05/2024     (H) 03/05/2024     No results for input(s): "TSH", "FREET4" in the last 168 hours.  Lab Results   Component Value Date    HGBA1C 10.4 (H) 01/30/2024       Nutritional status:   Body mass index is 51.42 kg/m².  Lab Results   Component Value Date    ALBUMIN 2.8 (L) 03/05/2024    ALBUMIN 2.6 (L) 03/04/2024    ALBUMIN 2.7 (L) 03/04/2024     No results found for: "PREALBUMIN"    Estimated Creatinine Clearance: 35.7 mL/min (A) (based on SCr of 2.6 mg/dL (H)).    Accu-Checks  Recent Labs     03/03/24  2017 03/04/24  0012 03/04/24  0347 03/04/24  0839 03/04/24  1119 03/04/24  1609 03/04/24  1958 03/04/24  2305 03/05/24  0415 03/05/24  0752   POCTGLUCOSE 199* 186* 171* 188* 193* 189* 176* 180* 174* 202*       Current Medications and/or Treatments Impacting Glycemic Control  Immunotherapy:    Immunosuppressants       None          Steroids:   Hormones (From admission, onward)      None          Pressors:    Autonomic Drugs (From admission, onward)      None          Hyperglycemia/Diabetes Medications: "   Antihyperglycemics (From admission, onward)      Start     Stop Route Frequency Ordered    03/04/24 1200  insulin aspart U-100 pen 8 Units         -- SubQ Every 4 hours 03/04/24 0915    02/27/24 1354  insulin aspart U-100 pen 0-10 Units         -- SubQ Every 4 hours PRN 02/27/24 1255    02/13/24 0915  insulin detemir U-100 (Levemir) pen 14 Units         -- SubQ Daily 02/13/24 0906

## 2024-03-05 NOTE — PLAN OF CARE
Patient's right perm-a-cath working well. Flushed and blood withdrawn with ease at bedside today. Catheter then hep locked and clamped. Please reach out if any questions or concerns.     Celia Allison MD  General Surgery   PGY-2

## 2024-03-05 NOTE — PROGRESS NOTES
"[facility]  Endocrinology  Progress Note    Admit Date: 1/29/2024     Reason for Consult: Management of T2DM, Hyperglycemia     Surgical Procedure and Date: n/a    Diabetes diagnosis year: new onset     Home Diabetes Medications:  none per chart review and family present at bedside     Lab Results   Component Value Date    HGBA1C 10.4 (H) 01/30/2024       Diabetes Complications include:     Hyperglycemia, DKA at OSH     Complicating diabetes co morbidities:   Obesity       HPI:   Patient is a 53 y.o. female with a diagnosis of obesity (BMI 53) admitted with N/V and R groin wound found to be in DKA at OSH. 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 While at OSH pt developed acute respiratory failure requiring intubation. Pulmonology was consulted for ventilator management and critical illness. Nephrology was consulted for worsening vishal in the setting of lactic acidosis and septic shock likely ATN, sCr elevated at 3.8 on admit now 4.0 post HD. Pt being admitted to SICU for surgical critical care management. Immediate plans include hemodynamic stabilization, weaning of respiratory support, and RRT.  Endocrinology consulted for management of T2DM.                 Interval HPI:   Overnight events: Remains in [facility]. POD 12. BG at higher end of goal ranges on current SQ insulin regimen. Tube feeds at 50 ml/hr Diet NPO    Eating:   NPO  Nausea: No  Hypoglycemia and intervention: No  Fever: No  TPN and/or TF: Yes  If yes, type of TF/TPN and rate: TFs at 50 ml/hr     BP (!) 178/85 (BP Location: Right arm, Patient Position: Lying)   Pulse 86   Temp 98 °F (36.7 °C) (Oral)   Resp 18   Ht 5' 5" (1.651 m)   Wt (!) 140.2 kg (309 lb)   LMP 01/01/2024 (Approximate) Comment: tubal ligation  SpO2 99%   BMI 51.42 kg/m²     Labs Reviewed and Include    Recent Labs   Lab 03/05/24  0529   *   CALCIUM 10.1   ALBUMIN 2.8* " "  PROT 8.9*      K 4.9   CO2 20*      *   CREATININE 2.6*   ALKPHOS 153*   ALT 30   AST 37   BILITOT 0.2     Lab Results   Component Value Date    WBC 10.47 03/05/2024    HGB 8.9 (L) 03/05/2024    HCT 28.1 (L) 03/05/2024    MCV 91 03/05/2024     (H) 03/05/2024     No results for input(s): "TSH", "FREET4" in the last 168 hours.  Lab Results   Component Value Date    HGBA1C 10.4 (H) 01/30/2024       Nutritional status:   Body mass index is 51.42 kg/m².  Lab Results   Component Value Date    ALBUMIN 2.8 (L) 03/05/2024    ALBUMIN 2.6 (L) 03/04/2024    ALBUMIN 2.7 (L) 03/04/2024     No results found for: "PREALBUMIN"    Estimated Creatinine Clearance: 35.7 mL/min (A) (based on SCr of 2.6 mg/dL (H)).    Accu-Checks  Recent Labs     03/03/24 2017 03/04/24  0012 03/04/24  0347 03/04/24  0839 03/04/24  1119 03/04/24  1609 03/04/24  1958 03/04/24  2305 03/05/24  0415 03/05/24  0752   POCTGLUCOSE 199* 186* 171* 188* 193* 189* 176* 180* 174* 202*       Current Medications and/or Treatments Impacting Glycemic Control  Immunotherapy:    Immunosuppressants       None          Steroids:   Hormones (From admission, onward)      None          Pressors:    Autonomic Drugs (From admission, onward)      None          Hyperglycemia/Diabetes Medications:   Antihyperglycemics (From admission, onward)      Start     Stop Route Frequency Ordered    03/04/24 1200  insulin aspart U-100 pen 8 Units         -- SubQ Every 4 hours 03/04/24 0915    02/27/24 1354  insulin aspart U-100 pen 0-10 Units         -- SubQ Every 4 hours PRN 02/27/24 1255    02/13/24 0915  insulin detemir U-100 (Levemir) pen 14 Units         -- SubQ Daily 02/13/24 0906            ASSESSMENT and PLAN    Renal/  * Ayaz's gangrene  Managed per primary team  Optimize BG control        ALVARADO (acute kidney injury)  Lab Results   Component Value Date    CREATININE 2.6 (H) 03/05/2024     Avoid insulin stacking  Titrate insulin slowly "       Endocrine  Morbid (severe) obesity due to excess calories  Body mass index is 51.42 kg/m².  May increase insulin resistance.         New onset type 2 diabetes mellitus  BG goal 140-180  No previous hx of T2DM per family at bedside. A1c of 10.4. DKA at OSH. On TF. BG stable on regimen.      Plan:  - Continue Levemir 14 units daily.  - Increase Novolog to 9 units q 4 hrs while on tube feeding (HOLD if tube feeding is stopped or BG < 100) increased based on prn SQ insulin requirements.   - Continue Moderate Dose Correction Scale  - BG monitoring q 4 hrs while NPO /TF    ** Please call Endocrine for any BG related issues **      Discharge plans: TBD    Lab Results   Component Value Date    HGBA1C 10.4 (H) 01/30/2024             Zoie Muñiz NP  Endocrinology  [facility]

## 2024-03-05 NOTE — RESPIRATORY THERAPY
"RAPID RESPONSE RESPIRATORY THERAPY PROACTIVE NOTE           Time of visit: 956     Code Status: Full Code   : 1970  Bed: 1046/1046 A:   MRN: [id number]  Time spent at the bedside: < 15 min    SITUATION    Evaluated patient for: LDA Check     BACKGROUND    Patient has no past medical history on file.  Clinically Significant Surgical Hx: tracheostomy    24 Hours Vitals Range:  Temp:  [97.7 °F (36.5 °C)-99.5 °F (37.5 °C)]   Pulse:  [78-96]   Resp:  [16-19]   BP: (133-178)/(69-88)   SpO2:  [94 %-100 %]     Labs:    Recent Labs     24  0419 24  0616 24  1355 24  0529   * 138 138 140   K 4.9 6.0* 5.0 4.9    105 105 106   CO2 19* 17* 19* 20*   * 113* 116* 116*   CREATININE 3.3* 3.0* 2.8* 2.6*   * 160* 168* 176*   PHOS 5.8* 5.7* 5.4* 5.6*   MG 2.1 2.2  --  2.1        No results for input(s): "PH", "PCO2", "PO2", "HCO3", "POCSATURATED", "BE" in the last 72 hours.    ASSESSMENT/INTERVENTIONS  Pt resting comfortably with no respiratory concerns during visit. Surgical airway secured, patent, and intact. All supplies at bedside.       Last VS   Temp: 98 °F (36.7 °C) (753)  Pulse: 87 ( 100)  Resp: 18 (753)  BP: 134/74 ( 100)  SpO2: 99 % (753)      Extra trachs at bedside: 6,8  Level of Consciousness: Level of Consciousness (AVPU): alert  Respiratory Effort: Respiratory Effort: Normal, Unlabored Expansion/Accessory Muscle Usage: Expansion/Accessory Muscles/Retractions: no use of accessory muscles  All Lung Field Breath Sounds: All Lung Fields Breath Sounds: Anterior:, Lateral:, coarse, diminished  JOSE Breath Sounds: clear  LLL Breath Sounds: clear, diminished  RUL Breath Sounds: clear  RML Breath Sounds: clear, diminished  RLL Breath Sounds: clear, diminished  O2 Device/Concentration: 5L/28% trach collar  Surgical airway: Yes, Type: Shiley Size: 8, cuffed  Ambu at bedside:       Active Orders   Respiratory Care    Inhalation Treatment Q4H PRN "     Frequency: Q4H PRN     Number of Occurrences: Until Specified    Oxygen Continuous     Frequency: Continuous     Number of Occurrences: Until Specified     Order Questions:      Device type: Low flow      Device: Trach Collar      FiO2%: 40      Titrate O2 per Oxygen Titration Protocol: Yes      To maintain SpO2 goal of: >= 90%      Notify MD of: Inability to achieve desired SpO2; Sudden change in patient status and requires 20% increase in FiO2; Patient requires >60% FiO2    Pulse Oximetry Continuous     Frequency: Continuous     Number of Occurrences: Until Specified    Routine tracheostomy care     Frequency: BID     Number of Occurrences: Until Specified    SUCTION Q4H     Frequency: Q4H     Number of Occurrences: Until Specified       RECOMMENDATIONS    We recommend: RRT Recs: Continue POC per primary team.      FOLLOW-UP    Please call back the Rapid Response RT, Cait Mcarthur RRT at x [phone] for any questions or concerns.

## 2024-03-06 NOTE — DISCHARGE SUMMARY
[facility]  Critical Care - Surgery  Discharge Summary      Patient Name: Sarah Saravia  MRN: [id number]  Admission Date: 1/29/2024  Hospital Length of Stay: 36 days  Discharge Date and Time: 3/5/2024  3:27 PM  Attending Physician: Deanna att. providers found   Discharging Provider: Steve Cole MD  Primary Care Provider: [facility]    HPI:  Sarah Saravia is a 53 year old female with a past medical history of obesity (BMI 53) admitted with N/V and R groin wound found to be in DKA at OSH.  She underwent a CT abdomen and pelvis that showed extensive soft tissue air throughout the right groin and inguinal region and extending to involve the right lower quadrant anterior abdominal wall with extensive soft tissue air also seen involving the proximal medial aspect of the right thigh, concerning for gas-forming infection. She is s/p OR debridement for necrotizing fascitis on 1/29/24 and take back for 1/31/24. Right groin tissue growing proteus, susceptibilities still pending. Currently on meropenem and clindamycin which was d/c'd on 1/31/24. While at OSH pt developed acute respiratory failure requiring intubation. Pulmonology was consulted for ventilator management and critical illness. Nephrology was consulted for worsening vishal in the setting of lactic acidosis and septic shock likely ATN, sCr elevated at 3.8 on admit now 4.0 post HD. Pt being admitted to SICU for surgical critical care management. Immediate plans include hemodynamic stabilization, weaning of respiratory support, and RRT.        Procedure(s) (LRB):  CREATION, TRACHEOSTOMY (N/A)  EGD, WITH PEG TUBE INSERTION (N/A)  CLOSURE, WOUND (Right)  REPLACEMENT, WOUND VAC (Right)    Indwelling Lines/Drains at Time of Discharge:   Lines/Drains/Airways       Central Venous Catheter Line  Duration                  Hemodialysis Catheter 02/29/24 1528 right internal jugular 5 days              Drain  Duration                   Gastrostomy/Enterostomy 02/22/24 1200 LUQ 12 days         Open Drain 02/22/24 1414 Tube - 1 Right Thigh Penrose Other (see comments) 12 days         Fecal Incontinence  02/29/24 2230 5 days         Urethral Catheter 02/29/24 2200 5 days              Airway  Duration             Adult Surgical Airway 02/22/24 Shiley Cuffed 8.0 / 85 mm 13 days                    Hospital Course:  Patient admitted as a transfer from [facility] for ongoing care and management from severe sepsis with necrotizing soft tissue infection.   She underwent serial debridements with wound VAC placement and subsequent right lower extremity wound closure for management of the soft tissue defect.  In addition to the necrotizing soft tissue infection, patient was noted to have altered mental status and found to have multiple new cerebral infarcts on CT head and MRI.  She was evaluated by Neurology, and underwent workup for etiology but no source was able to be determined at this time.  Due to altered mental status and failure to liberate from the that, tracheostomy and gastrostomy were placed in conjunction with family wishes.  She was also evaluated by Nephrology for inability to fully concentrate urine.  She was not completely and uric however due to ongoing needs for hemodialysis a tunneled PermCath was placed by Interventional Radiology for ongoing hemodialysis.  Once stable, trach collar without issue, and tolerating hemodialysis she was transferred to long-term acute care facility for ongoing care management.    Goals of Care Treatment Preferences:  Code Status: Full Code          What is most important right now is to focus on curative/life-prolongation (regardless of treatment burdens).  Accordingly, we have decided that the best plan to meet the patient's goals includes continuing with treatment.      Consults (From admission, onward)          Status Ordering Provider     Inpatient consult to Midline team  Once        Provider:   (Not yet assigned)    Completed CHELITA PHELPS     Inpatient consult to Interventional Radiology  Once        Provider:  (Not yet assigned)    Completed ISI HALLMAN C     Inpatient consult to Social Work  Once        Provider:  (Not yet assigned)    Completed FARRAN, AKRAM     Inpatient consult to Interventional Radiology  Once        Provider:  (Not yet assigned)    Completed ADAN, DELISA     Inpatient consult to Palliative Care  Once        Provider:  (Not yet assigned)    Completed ADAN, DELISA     Inpatient consult to Infectious Diseases  Once        Provider:  (Not yet assigned)    Completed FARRAN, AKRAM     Inpatient consult to Midline team  Once        Provider:  (Not yet assigned)    Completed FARRAN, AKRAM     Inpatient consult to Palliative Care  Once        Provider:  (Not yet assigned)    Completed ISI HALLMAN C     Inpatient consult to Vascular (Stroke) Neurology  Once        Provider:  (Not yet assigned)    Completed JANET BARLOW     Inpatient consult to Neurology  Once        Provider:  (Not yet assigned)    Completed FARRAN, AKRAM     Inpatient consult to Registered Dietitian/Nutritionist  Once        Provider:  (Not yet assigned)    Completed JEWELS FORRESTER     Inpatient consult to Endocrinology  Once        Provider:  (Not yet assigned)    Completed FARRAN AKRAM     Inpatient consult to Nephrology  Once        Provider:  (Not yet assigned)    Completed ERNESTO JADEN     Inpatient consult to Infectious Diseases  Once        Provider:  (Not yet assigned)    Completed OROZCO, JADEN     Inpatient consult to Pulmonology  Once        Provider:  Tad Cook MD    Completed SHELDON GARCIA     Inpatient consult to Registered Dietitian/Nutritionist  Once        Provider:  (Not yet assigned)    Completed TAD COOK     Inpatient consult to Infectious Diseases  Once        Provider:  (Not yet assigned)    Completed LISY GARCIAOLAANDERSON     Inpatient consult to Nephrology   "Once        Provider:  Bradley Garcia MD    Completed SHELDON GARCIA     Inpatient consult to Registered Dietitian/Nutritionist  Once        Provider:  (Not yet assigned)    Completed DORYS HUSAIN     Inpatient consult to General surgery  Once        Provider:  Suzanne Woo MD    Completed SCOTTY PHILLIPS            Significant Labs:  ABGs: No results for input(s): "PH", "PCO2", "HCO3", "POCSATURATED", "BE" in the last 48 hours.  Blood Culture: No results for input(s): "LABBLOO" in the last 48 hours.  CMP:   Recent Labs   Lab 03/04/24  1355 03/05/24  0529 03/06/24  0453    140 141   K 5.0 4.9 5.6*    106 107   CO2 19* 20* 20*   * 176* 225*   * 116* 120*   CREATININE 2.8* 2.6* 2.4*   CALCIUM 9.9 10.1 9.9   PROT  --  8.9* 8.7*   ALBUMIN 2.6* 2.8* 2.8*  2.8*   BILITOT  --  0.2 0.2   ALKPHOS  --  153* 165*   AST  --  37 32   ALT  --  30 31   ANIONGAP 14 14 14       Significant Imaging:  I have reviewed all pertinent imaging results/findings within the past 24 hours.    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 Yes    Cerebrovascular accident (CVA) due to embolism of precerebral artery [I63.10] 02/29/2024 Yes    Status post tracheostomy [Z93.0] 02/23/2024 Not Applicable    S/P percutaneous endoscopic gastrostomy (PEG) tube placement [Z93.1] 02/23/2024 Not Applicable    Acute " cystitis without hematuria [N30.00] 02/19/2024 No    Fever [R50.9] 02/14/2024 Yes    Hyperphosphatemia [E83.39] 02/11/2024 Yes    Hypoalbuminemia [E88.09] 02/08/2024 Yes    Cerebral infarction [I63.9] 02/07/2024 Yes    Encounter for palliative care [Z51.5] 02/07/2024 Not Applicable    Ac isch multi vasc territories stroke [I63.89] 02/06/2024 Yes    Leukocytosis [D72.829] 02/05/2024 Yes    Encephalopathy [G93.40] 02/03/2024 Yes    Encephalopathy, metabolic [G93.41] 02/02/2024 Yes    Acute hypoxemic respiratory failure [J96.01] 02/02/2024 Yes    Acute blood loss anemia [D62] 02/02/2024 Yes    Acute renal failure with tubular necrosis [N17.0] 02/02/2024 Yes    New onset type 2 diabetes mellitus [E11.9] 02/02/2024 Yes    Metabolic acidosis [E87.20] 01/31/2024 Yes    ALVARADO (acute kidney injury) [N17.9] 01/30/2024 Yes    Hyponatremia [E87.1] 01/30/2024 Yes    Diabetic acidosis without coma [E11.10] 01/29/2024 Yes    Morbid (severe) obesity due to excess calories [E66.01] 01/29/2024 Yes    Severe sepsis [A41.9, R65.20] 01/29/2024 Yes      Problems Resolved During this Admission:    Diagnosis Date Noted Date Resolved POA    Weaning from mechanically assisted ventilation initiated [Z99.11] 02/02/2024 02/26/2024 Not Applicable       Discharged Condition: stable    Disposition: Long Term Acute Care    Medications:  Transfer Medications (for Discharge Readmit only):   No current facility-administered medications for this encounter.     No current outpatient medications on file.     Facility-Administered Medications Ordered in Other Encounters   Medication Dose Route Frequency Provider Last Rate Last Admin    acetaminophen tablet 650 mg  650 mg Per G Tube Q4H PRN Braxton Calhoun PA-C        albuterol-ipratropium 2.5 mg-0.5 mg/3 mL nebulizer solution 3 mL  3 mL Nebulization Q4H PRN Braxton Calhoun PA-C        amLODIPine tablet 10 mg  10 mg Per G Tube Daily Braxton Calhoun, ALYSSA   10 mg at 03/06/24 3091    carvediloL  tablet 25 mg  25 mg Per G Tube BID Braxton Calhoun PA-C   25 mg at 03/06/24 0946    chlorhexidine 0.12 % solution 15 mL  15 mL Mouth/Throat BID Jules Burgess MD   15 mL at 03/06/24 0946    dextrose 10% bolus 125 mL 125 mL  12.5 g Intravenous PRN Braxton Calhoun PA-JUAN PABLO        dextrose 10% bolus 250 mL 250 mL  25 g Intravenous PRN Braxton Calhoun PA-JUAN PABLO        glucagon (human recombinant) injection 1 mg  1 mg Intramuscular PRN Braxton Calhoun PA-C        glucose chewable tablet 16 g  16 g Per G Tube PRN Braxton Calhoun PA-C        glucose chewable tablet 24 g  24 g Per G Tube PRN Braxton Calhoun PA-C        heparin (porcine) injection 7,500 Units  7,500 Units Subcutaneous Q8H Braxton Calhoun PA-JUAN PABLO   7,500 Units at 03/06/24 0531    hydrALAZINE tablet 25 mg  25 mg Per G Tube Q8H PRN Braxton Calhoun PA-C        insulin aspart U-100 pen 0-10 Units  0-10 Units Subcutaneous Q4H PRN Jules Burgess MD   4 Units at 03/06/24 0535    insulin aspart U-100 pen 8 Units  8 Units Subcutaneous Q4H Becky Hartley MD   8 Units at 03/06/24 0947    insulin detemir U-100 (Levemir) pen 14 Units  14 Units Subcutaneous Daily Braxton Calhoun PA-C   14 Units at 03/06/24 0946    labetaloL injection 10 mg  10 mg Intravenous Q6H PRN Braxton Calhoun PA-C        naloxone 0.4 mg/mL injection 0.02 mg  0.02 mg Intravenous PRN Braxton Calhoun PA-C        ondansetron injection 4 mg  4 mg Intravenous Q6H PRN Braxton Calhoun PA-C        prochlorperazine injection Soln 5 mg  5 mg Intravenous Q6H PRN Braxton Calhoun PA-C        psyllium husk (aspartame) 3.4 gram packet 1 packet  1 packet Per G Tube Daily Braxton Calhoun PA-C   1 packet at 03/06/24 0946    sevelamer carbonate pwpk 1.6 g  1.6 g Per G Tube TID Braxton Calhoun PA-JUAN PABLO   1.6 g at 03/06/24 0946    sodium chloride 0.9% bolus 250 mL 250 mL  250 mL Intravenous PRN Braxton Calhoun PA-C        sodium chloride 0.9% flush 10 mL  10  mL Intravenous PRN Braxton Cahloun PA-C        sodium zirconium cyclosilicate packet 10 g  10 g Per G Tube TID Stacy Braun MD Conor Coogan, MD  Critical Care - Surgery  [facility]

## 2024-03-07 ENCOUNTER — HOSPITAL ENCOUNTER (INPATIENT)
Facility: HOSPITAL | Age: 54
LOS: 5 days | Discharge: OTHER INSTITUTION | DRG: 871 | End: 2024-03-12
Attending: STUDENT IN AN ORGANIZED HEALTH CARE EDUCATION/TRAINING PROGRAM | Admitting: HOSPITALIST
Payer: MEDICAID

## 2024-03-07 DIAGNOSIS — R07.9 CHEST PAIN: ICD-10-CM

## 2024-03-07 DIAGNOSIS — N39.0 URINARY TRACT INFECTION WITHOUT HEMATURIA, SITE UNSPECIFIED: Primary | ICD-10-CM

## 2024-03-07 DIAGNOSIS — A41.9 SEPSIS: ICD-10-CM

## 2024-03-07 DIAGNOSIS — N49.3 FOURNIER'S GANGRENE: ICD-10-CM

## 2024-03-07 DIAGNOSIS — R00.0 TACHYCARDIA: ICD-10-CM

## 2024-03-07 DIAGNOSIS — R06.82 TACHYPNEA: ICD-10-CM

## 2024-03-07 DIAGNOSIS — L08.9 SOFT TISSUE INFECTION: ICD-10-CM

## 2024-03-07 LAB
ALBUMIN SERPL BCP-MCNC: 2.9 G/DL (ref 3.5–5.2)
ALLENS TEST: NORMAL
ALP SERPL-CCNC: 187 U/L (ref 55–135)
ALT SERPL W/O P-5'-P-CCNC: 36 U/L (ref 10–44)
ANION GAP SERPL CALC-SCNC: 15 MMOL/L (ref 8–16)
AST SERPL-CCNC: 36 U/L (ref 10–40)
BACTERIA #/AREA URNS AUTO: ABNORMAL /HPF
BASOPHILS # BLD AUTO: 0.04 K/UL (ref 0–0.2)
BASOPHILS NFR BLD: 0.3 % (ref 0–1.9)
BILIRUB SERPL-MCNC: 0.2 MG/DL (ref 0.1–1)
BILIRUB UR QL STRIP: NEGATIVE
BUN SERPL-MCNC: 124 MG/DL (ref 6–20)
CALCIUM SERPL-MCNC: 10.6 MG/DL (ref 8.7–10.5)
CHLORIDE SERPL-SCNC: 109 MMOL/L (ref 95–110)
CLARITY UR REFRACT.AUTO: ABNORMAL
CO2 SERPL-SCNC: 20 MMOL/L (ref 23–29)
COLOR UR AUTO: YELLOW
CREAT SERPL-MCNC: 2.6 MG/DL (ref 0.5–1.4)
CRP SERPL-MCNC: 27.5 MG/L (ref 0–8.2)
DIFFERENTIAL METHOD BLD: ABNORMAL
EOSINOPHIL # BLD AUTO: 0.8 K/UL (ref 0–0.5)
EOSINOPHIL NFR BLD: 5.3 % (ref 0–8)
ERYTHROCYTE [DISTWIDTH] IN BLOOD BY AUTOMATED COUNT: 16.7 % (ref 11.5–14.5)
EST. GFR  (NO RACE VARIABLE): 21.4 ML/MIN/1.73 M^2
GLUCOSE SERPL-MCNC: 218 MG/DL (ref 70–110)
GLUCOSE UR QL STRIP: NEGATIVE
HCT VFR BLD AUTO: 29 % (ref 37–48.5)
HCV AB SERPL QL IA: NORMAL
HGB BLD-MCNC: 8.9 G/DL (ref 12–16)
HGB UR QL STRIP: ABNORMAL
HYALINE CASTS UR QL AUTO: 1 /LPF
IMM GRANULOCYTES # BLD AUTO: 0.09 K/UL (ref 0–0.04)
IMM GRANULOCYTES NFR BLD AUTO: 0.6 % (ref 0–0.5)
KETONES UR QL STRIP: NEGATIVE
LDH SERPL L TO P-CCNC: 1.68 MMOL/L (ref 0.5–2.2)
LEUKOCYTE ESTERASE UR QL STRIP: ABNORMAL
LYMPHOCYTES # BLD AUTO: 3.2 K/UL (ref 1–4.8)
LYMPHOCYTES NFR BLD: 22.7 % (ref 18–48)
MAGNESIUM SERPL-MCNC: 2.3 MG/DL (ref 1.6–2.6)
MCH RBC QN AUTO: 28.4 PG (ref 27–31)
MCHC RBC AUTO-ENTMCNC: 30.7 G/DL (ref 32–36)
MCV RBC AUTO: 93 FL (ref 82–98)
MICROSCOPIC COMMENT: ABNORMAL
MONOCYTES # BLD AUTO: 1.3 K/UL (ref 0.3–1)
MONOCYTES NFR BLD: 9 % (ref 4–15)
NEUTROPHILS # BLD AUTO: 8.8 K/UL (ref 1.8–7.7)
NEUTROPHILS NFR BLD: 62.1 % (ref 38–73)
NITRITE UR QL STRIP: NEGATIVE
NRBC BLD-RTO: 0 /100 WBC
OHS QRS DURATION: 142 MS
OHS QTC CALCULATION: 497 MS
PH UR STRIP: 6 [PH] (ref 5–8)
PHOSPHATE SERPL-MCNC: 6.1 MG/DL (ref 2.7–4.5)
PLATELET # BLD AUTO: 522 K/UL (ref 150–450)
PMV BLD AUTO: 10.3 FL (ref 9.2–12.9)
POTASSIUM SERPL-SCNC: 5.1 MMOL/L (ref 3.5–5.1)
PROT SERPL-MCNC: 9.2 G/DL (ref 6–8.4)
PROT UR QL STRIP: ABNORMAL
RBC # BLD AUTO: 3.13 M/UL (ref 4–5.4)
RBC #/AREA URNS AUTO: >100 /HPF (ref 0–4)
SAMPLE: NORMAL
SITE: NORMAL
SODIUM SERPL-SCNC: 144 MMOL/L (ref 136–145)
SP GR UR STRIP: 1.02 (ref 1–1.03)
SQUAMOUS #/AREA URNS AUTO: 0 /HPF
URN SPEC COLLECT METH UR: ABNORMAL
WBC # BLD AUTO: 14.18 K/UL (ref 3.9–12.7)
WBC #/AREA URNS AUTO: 28 /HPF (ref 0–5)

## 2024-03-07 PROCEDURE — 12000002 HC ACUTE/MED SURGE SEMI-PRIVATE ROOM

## 2024-03-07 PROCEDURE — 96367 TX/PROPH/DG ADDL SEQ IV INF: CPT

## 2024-03-07 PROCEDURE — 25000003 PHARM REV CODE 250: Performed by: PHYSICIAN ASSISTANT

## 2024-03-07 PROCEDURE — 99900035 HC TECH TIME PER 15 MIN (STAT)

## 2024-03-07 PROCEDURE — 87186 SC STD MICRODIL/AGAR DIL: CPT | Mod: 59 | Performed by: EMERGENCY MEDICINE

## 2024-03-07 PROCEDURE — 87086 URINE CULTURE/COLONY COUNT: CPT | Mod: 59 | Performed by: EMERGENCY MEDICINE

## 2024-03-07 PROCEDURE — 99285 EMERGENCY DEPT VISIT HI MDM: CPT | Mod: 25

## 2024-03-07 PROCEDURE — 99900026 HC AIRWAY MAINTENANCE (STAT)

## 2024-03-07 PROCEDURE — 85025 COMPLETE CBC W/AUTO DIFF WBC: CPT | Performed by: EMERGENCY MEDICINE

## 2024-03-07 PROCEDURE — 94761 N-INVAS EAR/PLS OXIMETRY MLT: CPT

## 2024-03-07 PROCEDURE — 87088 URINE BACTERIA CULTURE: CPT | Performed by: EMERGENCY MEDICINE

## 2024-03-07 PROCEDURE — 86803 HEPATITIS C AB TEST: CPT | Performed by: PHYSICIAN ASSISTANT

## 2024-03-07 PROCEDURE — 96365 THER/PROPH/DIAG IV INF INIT: CPT

## 2024-03-07 PROCEDURE — 93010 ELECTROCARDIOGRAM REPORT: CPT | Mod: ,,, | Performed by: INTERNAL MEDICINE

## 2024-03-07 PROCEDURE — 93005 ELECTROCARDIOGRAM TRACING: CPT

## 2024-03-07 PROCEDURE — 27000221 HC OXYGEN, UP TO 24 HOURS

## 2024-03-07 PROCEDURE — 80053 COMPREHEN METABOLIC PANEL: CPT | Performed by: EMERGENCY MEDICINE

## 2024-03-07 PROCEDURE — 84100 ASSAY OF PHOSPHORUS: CPT | Performed by: EMERGENCY MEDICINE

## 2024-03-07 PROCEDURE — 81001 URINALYSIS AUTO W/SCOPE: CPT | Mod: 91 | Performed by: EMERGENCY MEDICINE

## 2024-03-07 PROCEDURE — 87040 BLOOD CULTURE FOR BACTERIA: CPT | Mod: 59 | Performed by: EMERGENCY MEDICINE

## 2024-03-07 PROCEDURE — 86140 C-REACTIVE PROTEIN: CPT | Performed by: EMERGENCY MEDICINE

## 2024-03-07 PROCEDURE — 27200966 HC CLOSED SUCTION SYSTEM

## 2024-03-07 PROCEDURE — 25500020 PHARM REV CODE 255: Performed by: STUDENT IN AN ORGANIZED HEALTH CARE EDUCATION/TRAINING PROGRAM

## 2024-03-07 PROCEDURE — 63600175 PHARM REV CODE 636 W HCPCS: Performed by: PHYSICIAN ASSISTANT

## 2024-03-07 PROCEDURE — 87077 CULTURE AEROBIC IDENTIFY: CPT | Mod: 59 | Performed by: EMERGENCY MEDICINE

## 2024-03-07 PROCEDURE — 83735 ASSAY OF MAGNESIUM: CPT | Performed by: EMERGENCY MEDICINE

## 2024-03-07 RX ADMIN — IOHEXOL 100 ML: 350 INJECTION, SOLUTION INTRAVENOUS at 08:03

## 2024-03-07 RX ADMIN — DAPTOMYCIN 1040 MG: 350 INJECTION, POWDER, LYOPHILIZED, FOR SOLUTION INTRAVENOUS at 07:03

## 2024-03-07 RX ADMIN — MEROPENEM 2 G: 1 INJECTION INTRAVENOUS at 06:03

## 2024-03-07 NOTE — Clinical Note
Diagnosis: Soft tissue infection [381439]   Future Attending Provider: KRYSTAL ROLAND [[id number]]   Reason for IP Medical Treatment  (Clinical interventions that can only be accomplished in the IP setting? ) :: IV abx, possible bedside debridement   I certify that Inpatient services for greater than or equal to 2 midnights are medically necessary:: Yes   Plans for Post-Acute care--if anticipated (pick the single best option):: E. LTAC Placement

## 2024-03-07 NOTE — ED PROVIDER NOTES
Encounter Date: 3/7/2024       History     Chief Complaint   Patient presents with    Wound Infection     From [facility]. They reported fever. Gangrene worsening.     53 y.o. female presents to the ED from [facility] with low grade fevers. 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  Labs with up trending white count and fever today.  Purulent drainage noted from surgical wound. She was sent to the emergency department for further evaluation.     The history is provided by the nursing home and medical records. The history is limited by the condition of the patient.     Review of patient's allergies indicates:  No Known Allergies  No past medical history on file.  Past Surgical History:   Procedure Laterality Date    CLOSURE OF WOUND Right 2/22/2024    Procedure: CLOSURE, WOUND;  Surgeon: Steve Cole MD;  Location: [address];  Service: General;  Laterality: Right;    ESOPHAGOGASTRODUODENOSCOPY W/ PEG N/A 2/22/2024    Procedure: EGD, WITH PEG TUBE INSERTION;  Surgeon: Steve Cole MD;  Location: [address];  Service: General;  Laterality: N/A;    INCISION AND DRAINAGE OF PERIRECTAL REGION N/A 1/29/2024    Procedure: INCISION AND DRAINAGE, PERIRECTAL REGION;  Surgeon: Axel Ramsay MD;  Location: [facility] OR;  Service: General;  Laterality: N/A;    LUMBAR PUNCTURE N/A 2/16/2024    Procedure: Lumbar Puncture;  Surgeon: Macy Boykin;  Location: [facility];  Service: Anesthesiology;  Laterality: N/A;    PLACEMENT, TRIALYSIS CATH Right 1/31/2024    Procedure: INSERTION, CATHETER, TRIPLE LUMEN, HEMODIALYSIS, TEMPORARY;  Surgeon: Alvin Junior MD;  Location: [facility] OR;  Service: General;  Laterality: Right;    REPLACEMENT OF WOUND VACUUM-ASSISTED CLOSURE DEVICE Right 2/12/2024    Procedure: REPLACEMENT, WOUND VAC;  Surgeon: Mundo Carmona MD;  Location:  [facility] OR 2ND FLR;  Service: General;  Laterality: Right;    REPLACEMENT OF WOUND VACUUM-ASSISTED CLOSURE DEVICE N/A 2/15/2024    Procedure: REPLACEMENT, WOUND VAC;  Surgeon: Steve Coel MD;  [facility];  Service: General;  Laterality: N/A;    REPLACEMENT OF WOUND VACUUM-ASSISTED CLOSURE DEVICE Right 2/19/2024    Procedure: REPLACEMENT, WOUND VAC;  Surgeon: Steve Cole MD;  [facility];  Service: General;  Laterality: Right;  RLE/groin    REPLACEMENT OF WOUND VACUUM-ASSISTED CLOSURE DEVICE Right 2/22/2024    Procedure: REPLACEMENT, WOUND VAC;  Surgeon: Steve Cole MD;  Location: [facility];  Service: General;  Laterality: Right;    TRACHEOSTOMY N/A 2/22/2024    Procedure: CREATION, TRACHEOSTOMY;  Surgeon: Steve Cole MD;  Location: [facility];  Service: General;  Laterality: N/A;    WOUND DEBRIDEMENT Bilateral 2/2/2024    Procedure: DEBRIDEMENT, WOUND;  Surgeon: Steve Cole MD;  Location: [address];  Service: General;  Laterality: Bilateral;  Bilateral groin  Possible wound vac placement    WOUND DEBRIDEMENT Right 2/6/2024    Procedure: DEBRIDEMENT, WOUND, replace wound vac, possible closure;  Surgeon: Steve Cole MD;  [address];  Service: General;  Laterality: Right;  RLE    WOUND DEBRIDEMENT Right 2/9/2024    Procedure: DEBRIDEMENT, WOUND w wound vac change;  Surgeon: Steve Cole MD;  [address];  Service: General;  Laterality: Right;  RLE    WOUND DEBRIDEMENT Right 2/12/2024    Procedure: R thigh wound debridement;  Surgeon: Mundo Carmona MD;  Location: [address];  Service: General;  Laterality: Right;    WOUND EXPLORATION Right 1/31/2024    Procedure: IRRIGATION & DEBRIDEMENT, WOUND DEBRIDEMENT;  Surgeon: Alvin Junior MD;  Location: [facility];  Service: General;  Laterality: Right;     No family history on file.     Review of Systems   Unable to perform ROS: Patient unresponsive       Physical Exam      Initial Vitals [03/07/24 1402]   BP Pulse Resp Temp SpO2   125/77 100 (!) 26 98.5 °F (36.9 °C) 100 %      MAP       --         Physical Exam    Nursing note and vitals reviewed.  Constitutional: She is not diaphoretic. She is Obese . No distress.   Tracheostomy present   HENT:   Head: Normocephalic and atraumatic.   Nose: Nose normal.   Eyes: Conjunctivae and EOM are normal.   Neck: Neck supple.   Cardiovascular:  Normal rate.           Pulmonary/Chest: No respiratory distress.   Genitourinary:    Genitourinary Comments: Right groin drain, with active drainage from around site.  Patient seems to be distressed with palpation team surrounding area     Musculoskeletal:      Cervical back: Neck supple.     Neurological: She is disoriented and unresponsive. She exhibits normal muscle tone.   Alert, though unresponsive to commands   Skin: No rash noted.         ED Course   Procedures  Labs Reviewed   CBC W/ AUTO DIFFERENTIAL - Abnormal; Notable for the following components:       Result Value    WBC 14.18 (*)     RBC 3.13 (*)     Hemoglobin 8.9 (*)     Hematocrit 29.0 (*)     MCHC 30.7 (*)     RDW 16.7 (*)     Platelets 522 (*)     Immature Granulocytes 0.6 (*)     Gran # (ANC) 8.8 (*)     Immature Grans (Abs) 0.09 (*)     Mono # 1.3 (*)     Eos # 0.8 (*)     All other components within normal limits   COMPREHENSIVE METABOLIC PANEL - Abnormal; Notable for the following components:    CO2 20 (*)     Glucose 218 (*)      (*)     Creatinine 2.6 (*)     Calcium 10.6 (*)     Total Protein 9.2 (*)     Albumin 2.9 (*)     Alkaline Phosphatase 187 (*)     eGFR 21.4 (*)     All other components within normal limits   URINALYSIS, REFLEX TO URINE CULTURE - Abnormal; Notable for the following components:    Appearance, UA Hazy (*)     Protein, UA 2+ (*)     Occult Blood UA 3+ (*)     Leukocytes, UA 1+ (*)     All other components within normal limits    Narrative:     Specimen Source->Urine   PHOSPHORUS - Abnormal; Notable  for the following components:    Phosphorus 6.1 (*)     All other components within normal limits   C-REACTIVE PROTEIN - Abnormal; Notable for the following components:    CRP 27.5 (*)     All other components within normal limits   URINALYSIS MICROSCOPIC - Abnormal; Notable for the following components:    RBC, UA >100 (*)     WBC, UA 28 (*)     All other components within normal limits    Narrative:     Specimen Source->Urine   COMPREHENSIVE METABOLIC PANEL - Abnormal; Notable for the following components:    Sodium 146 (*)     Chloride 111 (*)     CO2 21 (*)     Glucose 211 (*)      (*)     Creatinine 3.0 (*)     Total Protein 8.9 (*)     Albumin 2.8 (*)     Alkaline Phosphatase 162 (*)     eGFR 18.0 (*)     All other components within normal limits   PHOSPHORUS - Abnormal; Notable for the following components:    Phosphorus 6.6 (*)     All other components within normal limits   CBC W/ AUTO DIFFERENTIAL - Abnormal; Notable for the following components:    WBC 14.11 (*)     RBC 3.13 (*)     Hemoglobin 8.8 (*)     Hematocrit 28.9 (*)     MCHC 30.4 (*)     RDW 16.7 (*)     Platelets 489 (*)     Gran # (ANC) 10.0 (*)     Immature Grans (Abs) 0.06 (*)     Eos # 0.7 (*)     Lymph % 16.9 (*)     All other components within normal limits   POCT GLUCOSE - Abnormal; Notable for the following components:    POCT Glucose 214 (*)     All other components within normal limits   POCT GLUCOSE - Abnormal; Notable for the following components:    POCT Glucose 198 (*)     All other components within normal limits   POCT GLUCOSE - Abnormal; Notable for the following components:    POCT Glucose 243 (*)     All other components within normal limits   CULTURE, BLOOD    Narrative:     Aerobic and anaerobic   CULTURE, BLOOD    Narrative:     Aerobic and anaerobic   CULTURE, RESPIRATORY   CULTURE, URINE   CULTURE, ANAEROBIC   CULTURE, AEROBIC  (SPECIFY SOURCE)   HEPATITIS C ANTIBODY    Narrative:     Release to patient->Immediate    MAGNESIUM   MAGNESIUM   CK   CK    Narrative:     ADD ON CPK PER KERWIN JEAND PER MARIANO HAYES MD   ORDER# [id number] @  03/08/2024  08:54    ISTAT LACTATE        ECG Results              EKG 12-lead (Final result)        Collection Time Result Time QRS Duration [facility] QTC Calculation    03/07/24 14:11:05 03/07/24 15:27:46 142 497                     Final result by Interface, Lab In [facility] (03/07/24 15:27:54)                   Narrative:    Test Reason : R06.82,    Vent. Rate : 101 BPM     Atrial Rate : 101 BPM     P-R Int : 152 ms          QRS Dur : 142 ms      QT Int : 384 ms       P-R-T Axes : 059 069 008 degrees     QTc Int : 497 ms    Sinus tachycardia  Right bundle branch block  Abnormal ECG  When compared with ECG of 14-FEB-2024 20:53,  T wave inversion no longer evident in Anterior leads  Confirmed by GREGG GRANT MD ([id number]) on 3/7/2024 3:27:43 PM    Referred By:             Confirmed By:GREGG GRANT MD                                  Imaging Results               CT Abdomen Pelvis With IV Contrast NO Oral Contrast (Final result)  Result time 03/07/24 23:16:29      Final result by Taras Miller MD (03/07/24 23:16:29)                   Impression:      Abdomen CT and Pelvis CT:    1. Open wound of the right mons pubis with surrounding subcutaneous stranding and some trace subcutaneous gas.  Infection is favored.  Necrotizing infection not excluded.  Underlying neoplasm not excluded.  Correlate clinically.  2. Possible cystitis.  Correlate clinically.  No CT evidence of upper urinary tract infection at this time.  3. Radiopaque material in the intergluteal cleft possibly represents contrast evacuated from the rectum.  Correlate clinically for possible rectal incontinence.  4. Bibasilar pulmonary opacities most likely represent subsegmental atelectasis and scarring.  Pneumonia or other pathology not excluded.  Correlate clinically; consider follow-up chest CT in 3-6 months to  confirm resolution and help further exclude neoplasm.  5. Additional observations as detailed in the body of the report.  This report was flagged in Epic as abnormal.    Electronically signed by resident: Judy Deng  Date:    03/07/2024  Time:    21:16    Electronically signed by: Taras Miller  Date:    03/07/2024  Time:    23:16               Narrative:    EXAMINATION:  CT ABDOMEN PELVIS WITH IV CONTRAST    CLINICAL HISTORY:  Ayaz's gangrene;    TECHNIQUE:  Low dose axial images were obtained from the lung bases to the pubic symphysis following the intravenous administration of 100cc of Omnipaque 350.  Sagittal and coronal reformats were provided.    COMPARISON:  Chest radiograph 03/07/2024, 03/05/2024    Radiograph tube check 02/02/2024    Retroperitoneal ultrasound    CT abdomen pelvis 01/29/2024    FINDINGS:  Abdomen CT and pelvis CT:    Artifacts related to motion and/or beam hardening degrade numerous images.    Heart: Partially visualized central venous catheter terminating in the right atrium.  Prominent cardiac silhouette.  Visualized portions of the pericardium demonstrate no discrete fluid collections.    Lung bases/airways: Evaluation of the lung parenchyma limited due to respiratory motion.  Bilateral lower lobe pulmonary opacities, predominantly linear and bandlike, most likely represent atelectasis.  No consolidation, pleural effusion, discrete pulmonary or pleural mass, or pneumothorax apparent in the visualized lower chest.    Liver: Enlarged measuring 23 cm in craniocaudal dimension.  No discrete hepatic mass apparent on these portal venous phase images.    Gallbladder: Normal in appearance without evidence for cholecystitis.    Bile Ducts: No intra or extrahepatic biliary ductal dilation.    Pancreas: No pancreatic mass lesion or peripancreatic inflammatory change.    Spleen: Unremarkable.   1.4 cm accessory splenule.    Adrenals: Unremarkable.    Kidneys/ Ureters: Kidneys are normal in  size and enhance symmetrically.  No nephrolithiasis or hydroureteronephrosis.    Bladder: Bladder is decompressed around a Glynn catheter.  Allowing for this, some bladder wall thickening is nevertheless suggested.  There is also some faint pericystic stranding.    Reproductive organs: Unremarkable.    GI Tract/Mesentery: Percutaneous gastric tube in the gastric body.  Visualized loops of small and large bowel are normal in caliber without evidence for obstruction or inflammation.   Enteric contrast throughout the colon.  Normal appendix.    No abdominopelvic ascites, intraperitoneal free air.    Enlarged external iliac chain lymph nodes, right greater than left measuring up to 0.8 cm in short axis.    Abdominal wall/extraperitoneal soft tissues: Numerous anterior abdominal wall foci of subcutaneous stranding may represent sites of prior subcutaneous injection.    Small fat-containing inguinal hernia.    Skin and subcutaneous soft tissue defect, likely an open wound, with adjacent fat stranding, in the right mons pubis.  The subcutaneous fat stranding extends superolaterally approximately 10.0 cm.  There are few foci of subcutaneous emphysema (series 2 image 203).  This could represent a necrotizing soft tissue infection.    Vasculature: Abdominal aorta is normal in caliber, contour, and course without significant calcific atherosclerosis.    Portal veins, SMV and splenic vein are patent.    Bones: No acute displaced fracture.                                       X-Ray Chest AP Portable (Final result)  Result time 03/07/24 15:56:10      Final result by Cami Jeffrey MD (03/07/24 15:56:10)                   Impression:      No definite acute intrathoracic process seen.      Electronically signed by: Cami Jeffrey MD  Date:    03/07/2024  Time:    15:56               Narrative:    EXAMINATION:  XR CHEST AP PORTABLE    CLINICAL HISTORY:  Sepsis;    TECHNIQUE:  Single frontal view of the chest was  performed.    COMPARISON:  03/05/2024    FINDINGS:  Tracheostomy tube.  Right central line distal tip in the SVC.    The cardiac silhouette is normal in size.  The pulmonary vascularity is normal.    The lungs are clear.  No pleural effusion, no pneumothorax seen.    The osseous structures demonstrate no acute findings.                                       Medications   sodium chloride 0.9% flush 10 mL (has no administration in time range)   naloxone 0.4 mg/mL injection 0.02 mg (has no administration in time range)   glucagon (human recombinant) injection 1 mg (has no administration in time range)   dextrose 10% bolus 125 mL 125 mL (has no administration in time range)   dextrose 10% bolus 250 mL 250 mL (has no administration in time range)   amLODIPine tablet 10 mg (10 mg Per G Tube Given 3/8/24 0943)   ascorbic acid (vitamin C) tablet 500 mg (500 mg Per G Tube Given 3/8/24 0943)   carvediloL tablet 25 mg (25 mg Per G Tube Given 3/8/24 0943)   chlorhexidine 0.12 % solution 15 mL (15 mLs Mouth/Throat Given 3/8/24 0943)   DAPTOmycin (CUBICIN) 1,040 mg in sodium chloride 0.9% SolP 50 mL IVPB (has no administration in time range)   heparin (porcine) injection 7,500 Units (7,500 Units Subcutaneous Given 3/8/24 0614)   insulin aspart U-100 pen 8 Units (8 Units Subcutaneous Given 3/8/24 0953)   insulin detemir U-100 (Levemir) pen 14 Units (0 Units Subcutaneous Hold 3/8/24 0944)   meropenem (MERREM) 2 g in sodium chloride 0.9% 100 mL IVPB (0 g Intravenous Stopped 3/8/24 1050)   pantoprazole suspension 40 mg (40 mg Per G Tube Given 3/8/24 0943)   zinc sulfate capsule 220 mg (220 mg Per G Tube Given 3/8/24 0943)   sevelamer carbonate pwpk 1.6 g (1.6 g Per G Tube Given 3/8/24 0943)   psyllium husk (aspartame) 3.4 gram packet 1 packet (1 packet Per G Tube Given 3/8/24 0944)   albuterol-ipratropium 2.5 mg-0.5 mg/3 mL nebulizer solution 3 mL (has no administration in time range)   hydrALAZINE tablet 25 mg (has no administration  in time range)   labetalol 20 mg/4 mL (5 mg/mL) IV syring (has no administration in time range)   prochlorperazine injection Soln 5 mg (has no administration in time range)   ondansetron injection 4 mg (has no administration in time range)   insulin aspart U-100 pen 0-10 Units ( Subcutaneous Return to Cabinet 3/8/24 0401)   acetaminophen oral solution 650 mg (has no administration in time range)   mupirocin 2 % ointment (has no administration in time range)   meropenem (MERREM) 2 g in sodium chloride 0.9% 100 mL IVPB (0 g Intravenous Stopped 3/7/24 1910)   DAPTOmycin (CUBICIN) 1,040 mg in sodium chloride 0.9% SolP 50 mL IVPB (0 mg Intravenous Stopped 3/7/24 1957)   iohexoL (OMNIPAQUE 350) injection 100 mL (100 mLs Intravenous Given 3/7/24 2048)   acetaminophen oral solution 650 mg (650 mg Per G Tube Given 3/8/24 0128)     Medical Decision Making  53 y.o. female presents to the ED from [facility] with low grade fevers.  Appears in no acute distress, though non verbal at baseline. Vitals with tachypnea. Afebrile. Exam as above. I will initiate workup and reassess.      Ddx:  Ayaz's gangrene, UTI, pneumonia, bacteremia, viral illness    - Labs with leukocytosis. Normal lactate. Started on IV antibiotics as per recommendations by ID from LTAC  - Urine appears infected, may consider as source of infection. 1+ leukocytes and 3+ blood. No squams. 28 WBCs. On IV antibiotics  - Discussed with general surgery. Recommends CT AP with contrast despite kidney function. Plan to admit to  with GS to follow along   - Pending CT AP at this time to evaluate for intraabdominal abscess. Will signout to Attending Dr. Andujar pending CT AP. I intend admission at this time.       Amount and/or Complexity of Data Reviewed  Labs:  Decision-making details documented in ED Course.  Radiology: ordered.    Risk  OTC drugs.  Prescription drug management.  Decision regarding hospitalization.            [unfilled]   ED Course as of 03/08/24 1202    Thu Mar 07, 2024   1617 I discussed case with General surgery.  Recommends CT AP with contrast. 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

## 2024-03-08 ENCOUNTER — OFFICE VISIT (OUTPATIENT)
Dept: DIALYSIS | Facility: HOSPITAL | Age: 54
DRG: 871 | End: 2024-03-08
Attending: STUDENT IN AN ORGANIZED HEALTH CARE EDUCATION/TRAINING PROGRAM
Payer: MEDICAID

## 2024-03-08 PROBLEM — N18.9 ANEMIA DUE TO CHRONIC KIDNEY DISEASE: Status: ACTIVE | Noted: 2024-03-08

## 2024-03-08 PROBLEM — Z79.4 TYPE 2 DIABETES MELLITUS, WITH LONG-TERM CURRENT USE OF INSULIN: Status: ACTIVE | Noted: 2024-02-02

## 2024-03-08 PROBLEM — R13.10 DYSPHAGIA: Status: ACTIVE | Noted: 2024-03-08

## 2024-03-08 PROBLEM — E46 MALNUTRITION: Status: ACTIVE | Noted: 2024-03-08

## 2024-03-08 PROBLEM — E43 SEVERE MALNUTRITION: Status: ACTIVE | Noted: 2024-03-08

## 2024-03-08 PROBLEM — N39.0 UTI (URINARY TRACT INFECTION): Status: ACTIVE | Noted: 2024-03-08

## 2024-03-08 PROBLEM — I10 HTN (HYPERTENSION): Status: ACTIVE | Noted: 2024-03-08

## 2024-03-08 PROBLEM — N19 RENAL FAILURE: Status: ACTIVE | Noted: 2024-02-02

## 2024-03-08 PROBLEM — D63.1 ANEMIA DUE TO CHRONIC KIDNEY DISEASE: Status: ACTIVE | Noted: 2024-03-08

## 2024-03-08 PROBLEM — E87.0 HYPERNATREMIA: Status: ACTIVE | Noted: 2024-03-08

## 2024-03-08 PROBLEM — R19.7 DIARRHEA: Status: ACTIVE | Noted: 2024-03-08

## 2024-03-08 LAB
ALBUMIN SERPL BCP-MCNC: 2.8 G/DL (ref 3.5–5.2)
ALP SERPL-CCNC: 162 U/L (ref 55–135)
ALT SERPL W/O P-5'-P-CCNC: 35 U/L (ref 10–44)
ANION GAP SERPL CALC-SCNC: 14 MMOL/L (ref 8–16)
AST SERPL-CCNC: 29 U/L (ref 10–40)
BASOPHILS # BLD AUTO: 0.03 K/UL (ref 0–0.2)
BASOPHILS NFR BLD: 0.2 % (ref 0–1.9)
BILIRUB SERPL-MCNC: 0.3 MG/DL (ref 0.1–1)
BUN SERPL-MCNC: 130 MG/DL (ref 6–20)
CALCIUM SERPL-MCNC: 10.2 MG/DL (ref 8.7–10.5)
CHLORIDE SERPL-SCNC: 111 MMOL/L (ref 95–110)
CK SERPL-CCNC: 71 U/L (ref 20–180)
CO2 SERPL-SCNC: 21 MMOL/L (ref 23–29)
CREAT SERPL-MCNC: 3 MG/DL (ref 0.5–1.4)
DIFFERENTIAL METHOD BLD: ABNORMAL
EOSINOPHIL # BLD AUTO: 0.7 K/UL (ref 0–0.5)
EOSINOPHIL NFR BLD: 4.6 % (ref 0–8)
ERYTHROCYTE [DISTWIDTH] IN BLOOD BY AUTOMATED COUNT: 16.7 % (ref 11.5–14.5)
EST. GFR  (NO RACE VARIABLE): 18 ML/MIN/1.73 M^2
GLUCOSE SERPL-MCNC: 211 MG/DL (ref 70–110)
HCT VFR BLD AUTO: 28.9 % (ref 37–48.5)
HGB BLD-MCNC: 8.8 G/DL (ref 12–16)
IMM GRANULOCYTES # BLD AUTO: 0.06 K/UL (ref 0–0.04)
IMM GRANULOCYTES NFR BLD AUTO: 0.4 % (ref 0–0.5)
LYMPHOCYTES # BLD AUTO: 2.4 K/UL (ref 1–4.8)
LYMPHOCYTES NFR BLD: 16.9 % (ref 18–48)
MAGNESIUM SERPL-MCNC: 2.3 MG/DL (ref 1.6–2.6)
MCH RBC QN AUTO: 28.1 PG (ref 27–31)
MCHC RBC AUTO-ENTMCNC: 30.4 G/DL (ref 32–36)
MCV RBC AUTO: 92 FL (ref 82–98)
MONOCYTES # BLD AUTO: 1 K/UL (ref 0.3–1)
MONOCYTES NFR BLD: 7 % (ref 4–15)
NEUTROPHILS # BLD AUTO: 10 K/UL (ref 1.8–7.7)
NEUTROPHILS NFR BLD: 70.9 % (ref 38–73)
NRBC BLD-RTO: 0 /100 WBC
PHOSPHATE SERPL-MCNC: 6.6 MG/DL (ref 2.7–4.5)
PLATELET # BLD AUTO: 489 K/UL (ref 150–450)
PMV BLD AUTO: 10.4 FL (ref 9.2–12.9)
POCT GLUCOSE: 198 MG/DL (ref 70–110)
POCT GLUCOSE: 214 MG/DL (ref 70–110)
POCT GLUCOSE: 231 MG/DL (ref 70–110)
POCT GLUCOSE: 243 MG/DL (ref 70–110)
POCT GLUCOSE: 244 MG/DL (ref 70–110)
POTASSIUM SERPL-SCNC: 5 MMOL/L (ref 3.5–5.1)
PROT SERPL-MCNC: 8.9 G/DL (ref 6–8.4)
RBC # BLD AUTO: 3.13 M/UL (ref 4–5.4)
SODIUM SERPL-SCNC: 146 MMOL/L (ref 136–145)
WBC # BLD AUTO: 14.11 K/UL (ref 3.9–12.7)

## 2024-03-08 PROCEDURE — 31720 CLEARANCE OF AIRWAYS: CPT

## 2024-03-08 PROCEDURE — 27000207 HC ISOLATION

## 2024-03-08 PROCEDURE — 99900026 HC AIRWAY MAINTENANCE (STAT)

## 2024-03-08 PROCEDURE — 87070 CULTURE OTHR SPECIMN AEROBIC: CPT | Mod: 59

## 2024-03-08 PROCEDURE — 97165 OT EVAL LOW COMPLEX 30 MIN: CPT

## 2024-03-08 PROCEDURE — 87075 CULTR BACTERIA EXCEPT BLOOD: CPT

## 2024-03-08 PROCEDURE — 94761 N-INVAS EAR/PLS OXIMETRY MLT: CPT

## 2024-03-08 PROCEDURE — 99223 1ST HOSP IP/OBS HIGH 75: CPT | Mod: ,,, | Performed by: PHYSICIAN ASSISTANT

## 2024-03-08 PROCEDURE — 80053 COMPREHEN METABOLIC PANEL: CPT

## 2024-03-08 PROCEDURE — 87070 CULTURE OTHR SPECIMN AEROBIC: CPT

## 2024-03-08 PROCEDURE — 63600175 PHARM REV CODE 636 W HCPCS

## 2024-03-08 PROCEDURE — 25000003 PHARM REV CODE 250: Performed by: STUDENT IN AN ORGANIZED HEALTH CARE EDUCATION/TRAINING PROGRAM

## 2024-03-08 PROCEDURE — 87205 SMEAR GRAM STAIN: CPT

## 2024-03-08 PROCEDURE — 20600001 HC STEP DOWN PRIVATE ROOM

## 2024-03-08 PROCEDURE — 3066F NEPHROPATHY DOC TX: CPT | Mod: CPTII,,, | Performed by: INTERNAL MEDICINE

## 2024-03-08 PROCEDURE — 3046F HEMOGLOBIN A1C LEVEL >9.0%: CPT | Mod: CPTII,,, | Performed by: INTERNAL MEDICINE

## 2024-03-08 PROCEDURE — 27000221 HC OXYGEN, UP TO 24 HOURS

## 2024-03-08 PROCEDURE — 99900035 HC TECH TIME PER 15 MIN (STAT)

## 2024-03-08 PROCEDURE — 82550 ASSAY OF CK (CPK): CPT

## 2024-03-08 PROCEDURE — 90935 HEMODIALYSIS ONE EVALUATION: CPT

## 2024-03-08 PROCEDURE — 25000003 PHARM REV CODE 250: Performed by: EMERGENCY MEDICINE

## 2024-03-08 PROCEDURE — 25000003 PHARM REV CODE 250

## 2024-03-08 PROCEDURE — 99223 1ST HOSP IP/OBS HIGH 75: CPT | Mod: ,,, | Performed by: INTERNAL MEDICINE

## 2024-03-08 PROCEDURE — 5A1D70Z PERFORMANCE OF URINARY FILTRATION, INTERMITTENT, LESS THAN 6 HOURS PER DAY: ICD-10-PCS | Performed by: HOSPITALIST

## 2024-03-08 PROCEDURE — 63600175 PHARM REV CODE 636 W HCPCS: Performed by: STUDENT IN AN ORGANIZED HEALTH CARE EDUCATION/TRAINING PROGRAM

## 2024-03-08 PROCEDURE — 84100 ASSAY OF PHOSPHORUS: CPT

## 2024-03-08 PROCEDURE — 87077 CULTURE AEROBIC IDENTIFY: CPT

## 2024-03-08 PROCEDURE — 85025 COMPLETE CBC W/AUTO DIFF WBC: CPT

## 2024-03-08 PROCEDURE — 83735 ASSAY OF MAGNESIUM: CPT

## 2024-03-08 PROCEDURE — 25000242 PHARM REV CODE 250 ALT 637 W/ HCPCS

## 2024-03-08 PROCEDURE — 87186 SC STD MICRODIL/AGAR DIL: CPT

## 2024-03-08 PROCEDURE — 97163 PT EVAL HIGH COMPLEX 45 MIN: CPT

## 2024-03-08 PROCEDURE — 27100171 HC OXYGEN HIGH FLOW UP TO 24 HOURS

## 2024-03-08 RX ORDER — INSULIN ASPART 100 [IU]/ML
8 INJECTION, SOLUTION INTRAVENOUS; SUBCUTANEOUS EVERY 4 HOURS
Status: DISCONTINUED | OUTPATIENT
Start: 2024-03-08 | End: 2024-03-12 | Stop reason: HOSPADM

## 2024-03-08 RX ORDER — ZINC SULFATE 50(220)MG
220 CAPSULE ORAL DAILY
Status: DISCONTINUED | OUTPATIENT
Start: 2024-03-08 | End: 2024-03-12 | Stop reason: HOSPADM

## 2024-03-08 RX ORDER — PANTOPRAZOLE SODIUM 40 MG/1
40 FOR SUSPENSION ORAL DAILY
Status: DISCONTINUED | OUTPATIENT
Start: 2024-03-08 | End: 2024-03-12 | Stop reason: HOSPADM

## 2024-03-08 RX ORDER — CHLORHEXIDINE GLUCONATE ORAL RINSE 1.2 MG/ML
15 SOLUTION DENTAL 2 TIMES DAILY
Status: DISCONTINUED | OUTPATIENT
Start: 2024-03-08 | End: 2024-03-12 | Stop reason: HOSPADM

## 2024-03-08 RX ORDER — HEPARIN SODIUM 5000 [USP'U]/ML
7500 INJECTION, SOLUTION INTRAVENOUS; SUBCUTANEOUS EVERY 8 HOURS
Status: DISCONTINUED | OUTPATIENT
Start: 2024-03-08 | End: 2024-03-12 | Stop reason: HOSPADM

## 2024-03-08 RX ORDER — ACETAMINOPHEN 650 MG/20.3ML
650 LIQUID ORAL EVERY 4 HOURS PRN
Status: DISCONTINUED | OUTPATIENT
Start: 2024-03-08 | End: 2024-03-12 | Stop reason: HOSPADM

## 2024-03-08 RX ORDER — IBUPROFEN 200 MG
16 TABLET ORAL
Status: DISCONTINUED | OUTPATIENT
Start: 2024-03-08 | End: 2024-03-08

## 2024-03-08 RX ORDER — HYDRALAZINE HYDROCHLORIDE 25 MG/1
25 TABLET, FILM COATED ORAL EVERY 8 HOURS PRN
Status: DISCONTINUED | OUTPATIENT
Start: 2024-03-08 | End: 2024-03-12 | Stop reason: HOSPADM

## 2024-03-08 RX ORDER — INSULIN ASPART 100 [IU]/ML
0-10 INJECTION, SOLUTION INTRAVENOUS; SUBCUTANEOUS EVERY 4 HOURS PRN
Status: DISCONTINUED | OUTPATIENT
Start: 2024-03-08 | End: 2024-03-12 | Stop reason: HOSPADM

## 2024-03-08 RX ORDER — SODIUM CHLORIDE 0.9 % (FLUSH) 0.9 %
10 SYRINGE (ML) INJECTION
Status: DISCONTINUED | OUTPATIENT
Start: 2024-03-08 | End: 2024-03-08

## 2024-03-08 RX ORDER — NALOXONE HCL 0.4 MG/ML
0.02 VIAL (ML) INJECTION
Status: DISCONTINUED | OUTPATIENT
Start: 2024-03-08 | End: 2024-03-12 | Stop reason: HOSPADM

## 2024-03-08 RX ORDER — AMLODIPINE BESYLATE 10 MG/1
10 TABLET ORAL DAILY
Status: DISCONTINUED | OUTPATIENT
Start: 2024-03-08 | End: 2024-03-12 | Stop reason: HOSPADM

## 2024-03-08 RX ORDER — ACETAMINOPHEN 650 MG/20.3ML
650 LIQUID ORAL EVERY 4 HOURS PRN
Status: DISCONTINUED | OUTPATIENT
Start: 2024-03-08 | End: 2024-03-08

## 2024-03-08 RX ORDER — ASCORBIC ACID 500 MG
500 TABLET ORAL 2 TIMES DAILY
Status: DISCONTINUED | OUTPATIENT
Start: 2024-03-08 | End: 2024-03-12 | Stop reason: HOSPADM

## 2024-03-08 RX ORDER — MUPIROCIN 20 MG/G
OINTMENT TOPICAL 2 TIMES DAILY
Status: DISCONTINUED | OUTPATIENT
Start: 2024-03-08 | End: 2024-03-12 | Stop reason: HOSPADM

## 2024-03-08 RX ORDER — CARVEDILOL 25 MG/1
25 TABLET ORAL 2 TIMES DAILY
Status: DISCONTINUED | OUTPATIENT
Start: 2024-03-08 | End: 2024-03-12 | Stop reason: HOSPADM

## 2024-03-08 RX ORDER — GLUCAGON 1 MG
1 KIT INJECTION
Status: DISCONTINUED | OUTPATIENT
Start: 2024-03-08 | End: 2024-03-12 | Stop reason: HOSPADM

## 2024-03-08 RX ORDER — IPRATROPIUM BROMIDE AND ALBUTEROL SULFATE 2.5; .5 MG/3ML; MG/3ML
3 SOLUTION RESPIRATORY (INHALATION) EVERY 4 HOURS PRN
Status: DISCONTINUED | OUTPATIENT
Start: 2024-03-08 | End: 2024-03-12 | Stop reason: HOSPADM

## 2024-03-08 RX ORDER — LABETALOL HCL 20 MG/4 ML
10 SYRINGE (ML) INTRAVENOUS EVERY 6 HOURS PRN
Status: DISCONTINUED | OUTPATIENT
Start: 2024-03-08 | End: 2024-03-12 | Stop reason: HOSPADM

## 2024-03-08 RX ORDER — PROCHLORPERAZINE EDISYLATE 5 MG/ML
5 INJECTION INTRAMUSCULAR; INTRAVENOUS EVERY 6 HOURS PRN
Status: DISCONTINUED | OUTPATIENT
Start: 2024-03-08 | End: 2024-03-12 | Stop reason: HOSPADM

## 2024-03-08 RX ORDER — SODIUM CHLORIDE 9 MG/ML
INJECTION, SOLUTION INTRAVENOUS ONCE
Status: CANCELLED | OUTPATIENT
Start: 2024-03-08 | End: 2024-03-08

## 2024-03-08 RX ORDER — ACETAMINOPHEN 650 MG/20.3ML
650 LIQUID ORAL
Status: DISCONTINUED | OUTPATIENT
Start: 2024-03-08 | End: 2024-03-08

## 2024-03-08 RX ORDER — ACETAMINOPHEN 650 MG/20.3ML
650 LIQUID ORAL
Status: COMPLETED | OUTPATIENT
Start: 2024-03-08 | End: 2024-03-08

## 2024-03-08 RX ORDER — ACETAMINOPHEN 325 MG/1
650 TABLET ORAL EVERY 4 HOURS PRN
Status: DISCONTINUED | OUTPATIENT
Start: 2024-03-08 | End: 2024-03-08

## 2024-03-08 RX ORDER — SEVELAMER CARBONATE FOR ORAL SUSPENSION 800 MG/1
1.6 POWDER, FOR SUSPENSION ORAL 3 TIMES DAILY
Status: DISCONTINUED | OUTPATIENT
Start: 2024-03-08 | End: 2024-03-12 | Stop reason: HOSPADM

## 2024-03-08 RX ORDER — SODIUM CHLORIDE 0.9 % (FLUSH) 0.9 %
10 SYRINGE (ML) INJECTION EVERY 12 HOURS PRN
Status: DISCONTINUED | OUTPATIENT
Start: 2024-03-08 | End: 2024-03-12 | Stop reason: HOSPADM

## 2024-03-08 RX ORDER — IBUPROFEN 200 MG
24 TABLET ORAL
Status: DISCONTINUED | OUTPATIENT
Start: 2024-03-08 | End: 2024-03-08

## 2024-03-08 RX ORDER — HEPARIN SODIUM 1000 [USP'U]/ML
1000 INJECTION, SOLUTION INTRAVENOUS; SUBCUTANEOUS
Status: DISCONTINUED | OUTPATIENT
Start: 2024-03-08 | End: 2024-03-12 | Stop reason: HOSPADM

## 2024-03-08 RX ORDER — ONDANSETRON HYDROCHLORIDE 2 MG/ML
4 INJECTION, SOLUTION INTRAVENOUS EVERY 6 HOURS PRN
Status: DISCONTINUED | OUTPATIENT
Start: 2024-03-08 | End: 2024-03-12 | Stop reason: HOSPADM

## 2024-03-08 RX ADMIN — CARVEDILOL 25 MG: 25 TABLET, FILM COATED ORAL at 08:03

## 2024-03-08 RX ADMIN — CHLORHEXIDINE GLUCONATE 0.12% ORAL RINSE 15 ML: 1.2 LIQUID ORAL at 09:03

## 2024-03-08 RX ADMIN — PSYLLIUM HUSK 1 PACKET: 3.4 POWDER ORAL at 09:03

## 2024-03-08 RX ADMIN — MEROPENEM 2 G: 1 INJECTION INTRAVENOUS at 09:03

## 2024-03-08 RX ADMIN — Medication 500 MG: at 09:03

## 2024-03-08 RX ADMIN — CHLORHEXIDINE GLUCONATE 0.12% ORAL RINSE 15 ML: 1.2 LIQUID ORAL at 08:03

## 2024-03-08 RX ADMIN — SEVELAMER CARBONATE 1.6 G: 800 POWDER, FOR SUSPENSION ORAL at 08:03

## 2024-03-08 RX ADMIN — INSULIN ASPART 8 UNITS: 100 INJECTION, SOLUTION INTRAVENOUS; SUBCUTANEOUS at 08:03

## 2024-03-08 RX ADMIN — MUPIROCIN: 20 OINTMENT TOPICAL at 08:03

## 2024-03-08 RX ADMIN — ACETAMINOPHEN 650 MG: 650 SOLUTION ORAL at 01:03

## 2024-03-08 RX ADMIN — AMLODIPINE BESYLATE 10 MG: 10 TABLET ORAL at 09:03

## 2024-03-08 RX ADMIN — HEPARIN SODIUM 7500 UNITS: 5000 INJECTION INTRAVENOUS; SUBCUTANEOUS at 01:03

## 2024-03-08 RX ADMIN — HEPARIN SODIUM 7500 UNITS: 5000 INJECTION INTRAVENOUS; SUBCUTANEOUS at 06:03

## 2024-03-08 RX ADMIN — Medication 500 MG: at 08:03

## 2024-03-08 RX ADMIN — DAPTOMYCIN 1040 MG: 350 INJECTION, POWDER, LYOPHILIZED, FOR SOLUTION INTRAVENOUS at 10:03

## 2024-03-08 RX ADMIN — HEPARIN SODIUM 1000 UNITS: 1000 INJECTION, SOLUTION INTRAVENOUS; SUBCUTANEOUS at 06:03

## 2024-03-08 RX ADMIN — CARVEDILOL 25 MG: 25 TABLET, FILM COATED ORAL at 09:03

## 2024-03-08 RX ADMIN — INSULIN ASPART 8 UNITS: 100 INJECTION, SOLUTION INTRAVENOUS; SUBCUTANEOUS at 01:03

## 2024-03-08 RX ADMIN — INSULIN ASPART 8 UNITS: 100 INJECTION, SOLUTION INTRAVENOUS; SUBCUTANEOUS at 09:03

## 2024-03-08 RX ADMIN — SEVELAMER CARBONATE 1.6 G: 800 POWDER, FOR SUSPENSION ORAL at 09:03

## 2024-03-08 RX ADMIN — SEVELAMER CARBONATE 1.6 G: 800 POWDER, FOR SUSPENSION ORAL at 02:03

## 2024-03-08 RX ADMIN — INSULIN ASPART 4 UNITS: 100 INJECTION, SOLUTION INTRAVENOUS; SUBCUTANEOUS at 08:03

## 2024-03-08 RX ADMIN — HEPARIN SODIUM 7500 UNITS: 5000 INJECTION INTRAVENOUS; SUBCUTANEOUS at 08:03

## 2024-03-08 RX ADMIN — ZINC SULFATE 220 MG (50 MG) CAPSULE 220 MG: CAPSULE at 09:03

## 2024-03-08 RX ADMIN — PANTOPRAZOLE SODIUM 40 MG: 40 GRANULE, DELAYED RELEASE ORAL at 09:03

## 2024-03-08 RX ADMIN — MUPIROCIN: 20 OINTMENT TOPICAL at 01:03

## 2024-03-08 NOTE — SUBJECTIVE & OBJECTIVE
Interval History: CT imaging obtained, no significant fluid collection present.  Opening wound at bedside today and will pack with wet to dry.    Medications:  Continuous Infusions:  Scheduled Meds:   amLODIPine  10 mg Per G Tube Daily    ascorbic acid (vitamin C)  500 mg Per G Tube BID    carvediloL  25 mg Per G Tube BID    chlorhexidine  15 mL Mouth/Throat BID    DAPTOmycin (CUBICIN) IV (PEDS and ADULTS)  8 mg/kg Intravenous Q24H    heparin (porcine)  7,500 Units Subcutaneous Q8H    insulin aspart U-100  8 Units Subcutaneous Q4H    insulin detemir U-100  14 Units Subcutaneous Daily    meropenem (MERREM) IVPB  2 g Intravenous Q12H    pantoprazole  40 mg Per G Tube Daily    psyllium husk (aspartame)  1 packet Per G Tube Daily    sevelamer carbonate  1.6 g Per G Tube TID    zinc sulfate  220 mg Per G Tube Daily     PRN Meds:acetaminophen, albuterol-ipratropium, dextrose 10%, dextrose 10%, glucagon (human recombinant), glucose, glucose, hydrALAZINE, insulin aspart U-100, labetalol, naloxone, ondansetron, prochlorperazine, sodium chloride 0.9%     Review of patient's allergies indicates:  No Known Allergies  Objective:     Vital Signs (Most Recent):  Temp: 98.8 °F (37.1 °C) (03/08/24 0502)  Pulse: 97 (03/08/24 0700)  Resp: 18 (03/08/24 0700)  BP: (!) 161/72 (03/08/24 0700)  SpO2: 99 % (03/08/24 0700) Vital Signs (24h Range):  Temp:  [98.2 °F (36.8 °C)-100.3 °F (37.9 °C)] 98.8 °F (37.1 °C)  Pulse:  [] 97  Resp:  [15-33] 18  SpO2:  [92 %-100 %] 99 %  BP: (125-180)/(61-82) 161/72     Weight: 129.7 kg (286 lb)  Body mass index is 43.49 kg/m².    Intake/Output - Last 3 Shifts         03/06 0700  03/07 0659 03/07 0700  03/08 0659 03/08 0700  03/09 0659    NG/ 500     IV Piggyback  142.2     Total Intake(mL/kg) 720 (5.5) 642.2 (5)     Urine (mL/kg/hr) 1400 (0.4) 400 (0.1)     Other 0      Stool 0      Total Output 1400 400     Net -680 +242.2            Stool Occurrence 1 x               Physical  Exam  Constitutional:       Comments: Not following commands or responding   HENT:      Head: Normocephalic.   Cardiovascular:      Rate and Rhythm: Normal rate and regular rhythm.   Pulmonary:      Effort: Pulmonary effort is normal. No respiratory distress.   Abdominal:      General: There is no distension.   Musculoskeletal:      Comments: Occassionally moves upper extremities nonpurposefully   Skin:     General: Skin is warm and dry.      Comments: Posterior wound with drainage   Neurological:      Comments: Unresponsive, unchanged          Significant Labs:  I have reviewed all pertinent lab results within the past 24 hours.  CBC:   Recent Labs   Lab 03/08/24  0403   WBC 14.11*   RBC 3.13*   HGB 8.8*   HCT 28.9*   *   MCV 92   MCH 28.1   MCHC 30.4*     CMP:   Recent Labs   Lab 03/07/24  1600 03/08/24  0403   * 211*   CALCIUM 10.6* 10.2   ALBUMIN 2.9* 2.8*   PROT 9.2* 8.9*    146*   K 5.1 5.0   CO2 20* 21*    111*   *  --    CREATININE 2.6* 3.0*   ALKPHOS 187* 162*   ALT 36 35   AST 36 29   BILITOT 0.2 0.3       Significant Diagnostics:  I have reviewed all pertinent imaging results/findings within the past 24 hours.

## 2024-03-08 NOTE — ASSESSMENT & PLAN NOTE
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 CTH 2/3, MRI 2/5 with scattered hypoattenuation likely representing infarcts. EEG findings consistent with moderate-severe encephalopathy.  Repeat CTH and MRI/MRA unchanged from prior exams.LP 2/16. Elevated WBCs and protein consistent with bacterial meningitis. CSF culture 2/19 with no growth    - Delirium precautions  - PT/OT/ST consults

## 2024-03-08 NOTE — HPI
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 Cultures w/ proteus mirabilis and bacteroides. She was treated with broad spectrum antibiotics. 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 TTE without evidence of infective endocarditis.Thehe patients hospital course was further complicated by fever and leukocytosis prompting primary service for performed urine studies. 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 Patient completed antibiotics 2/22. Patient was admitted to [facility] for  wound care, PT/OT/ST, respiratory and trach management. ID consulted today to due low grade fevers at [facility] and found to have ongoing purulence from wound.  Patient transferred back to [facility] for eval.    Patient has been placed on Daptomycin and meropenem.  Tmax 100.3 and WBC 14s.  Blood cultures NGTD.  Gen Sx has eval'd patient and rec CT ABD/Pelvis, possible penrose drain removal and wound opening with packing (done 3/8), as well ad ID work up.  CT ABD/Pelvis:  1. Open wound of the right mons pubis with surrounding subcutaneous stranding and some trace subcutaneous gas.  Infection is favored.  Necrotizing infection not excluded.  Underlying neoplasm not excluded.  Correlate clinically.   2. Possible cystitis.  Correlate clinically.  No CT evidence of upper urinary tract infection at this time.   3. Radiopaque material in the  intergluteal cleft possibly represents contrast evacuated from the rectum.  Correlate clinically for possible rectal incontinence.   4. Bibasilar pulmonary opacities most likely represent subsegmental atelectasis and scarring.  Pneumonia or other pathology not excluded.     CXR: lungs clear, no acute process.     Wound cultures have been sent.  Catheterized urine - hazy with WBC 28. ID now consulted for abx recs.

## 2024-03-08 NOTE — H&P
[facility] Emergency Dept  [facility]  History & Physical    Patient Name: Sarah Saravia  MRN: [id number]  Patient Class: IP- Inpatient  Admission Date: 3/7/2024  Attending Physician: Janett Jean MD   [facility]         Patient information was obtained from patient and ER records.     Subjective:     Principal Problem:Sepsis    Chief Complaint:   Chief Complaint   Patient presents with    Wound Infection     From [facility]. They reported fever. Gangrene worsening.        HPI: 53 y.o. female with hx of recent Ayaz's gangrene s/p surgical debridement, T2DM, AHRF s/p tracheostomy, embolic CVA, presents to the ED from LTAC with low grade fevers. 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 Hospital course was complicated by DKA, ART requiring RRT, acute respiratory failure requiring intubation and subsequent tracheostomy, and was found to have multiple infarcts on MRI brain with minimal spontaneous movements.  Palliative consulted however family wanted to proceed with trach (8.0/85 mm cuffed), PEG placement on 2/22/24. She was also evaluated by Nephrology for inability to fully concentrate urine. She was not completely anuric however due to ongoing needs for hemodialysis a tunneled PermCath was placed by Interventional Radiology on 2/29. Prior cultures w/ proteus mirabilis and bacteroides. She was treated with broad spectrum antibiotics and completed antibiotics 2/22. Patient was discharged to LT for wound care, therapy, respiratory and trach management on 3/5. She developed a fever 100.3 F with WBC trending up while at LTAC yesterday. She was noted to have copious malodorous purulence drainage from her wound and was started on Daptomycin and Meropenum by ID yesterday and was recommend for patient to be transferred to the ED for CT  abd.    In the ED, patient fevered at 100.3, was tachycardiac, tachypneic and sating at 100% on 5 L/min and FiO2 of 28 via trach. Labs notable for WBC 14.18, Hgb 8.9, Bicarb 20, , Cr 2.6, Phos 6.1, CRP 27.5. UA with WBC 28/hpf, Leukocyte esterases +1 concerning for UTI. CT A/P showed open wound of the right mons pubis with surrounding subcutaneous stranding and some trace subcutaneous gas concerning for infection. Gen surgery evaluated in ED; recommended admission to hospital medicine management of her chronic conditions.     History limited by patient's condition.    No past medical history on file.    Past Surgical History:   Procedure Laterality Date    CLOSURE OF WOUND Right 2/22/2024    Procedure: CLOSURE, WOUND;  Surgeon: Steve Cole MD;  Location: [address];  Service: General;  Laterality: Right;    ESOPHAGOGASTRODUODENOSCOPY W/ PEG N/A 2/22/2024    Procedure: EGD, WITH PEG TUBE INSERTION;  Surgeon: Steve Cole MD;  Location: [address];  Service: General;  Laterality: N/A;    INCISION AND DRAINAGE OF PERIRECTAL REGION N/A 1/29/2024    Procedure: INCISION AND DRAINAGE, PERIRECTAL REGION;  Surgeon: Axel Ramsay MD;  Location: [facility] OR;  Service: General;  Laterality: N/A;    LUMBAR PUNCTURE N/A 2/16/2024    Procedure: Lumbar Puncture;  Surgeon: Macy Boykin;  Location: [facility];  Service: Anesthesiology;  Laterality: N/A;    PLACEMENT, TRIALYSIS CATH Right 1/31/2024    Procedure: INSERTION, CATHETER, TRIPLE LUMEN, HEMODIALYSIS, TEMPORARY;  Surgeon: Alvin Junior MD;  Location: [facility] OR;  Service: General;  Laterality: Right;    REPLACEMENT OF WOUND VACUUM-ASSISTED CLOSURE DEVICE Right 2/12/2024    Procedure: REPLACEMENT, WOUND VAC;  Surgeon: Mundo Carmona MD;  Location: [address];  Service: General;  Laterality: Right;    REPLACEMENT OF WOUND VACUUM-ASSISTED CLOSURE DEVICE N/A 2/15/2024    Procedure: REPLACEMENT, WOUND VAC;  Surgeon: Steve Cole MD;  Location:  [facility] OR 2ND FLR;  Service: General;  Laterality: N/A;    REPLACEMENT OF WOUND VACUUM-ASSISTED CLOSURE DEVICE Right 2/19/2024    Procedure: REPLACEMENT, WOUND VAC;  Surgeon: Steve Cole MD;  [facility];  Service: General;  Laterality: Right;  RLE/groin    REPLACEMENT OF WOUND VACUUM-ASSISTED CLOSURE DEVICE Right 2/22/2024    Procedure: REPLACEMENT, WOUND VAC;  Surgeon: Steve Cole MD;  [facility];  Service: General;  Laterality: Right;    TRACHEOSTOMY N/A 2/22/2024    Procedure: CREATION, TRACHEOSTOMY;  Surgeon: Steve Cole MD;  [facility];  Service: General;  Laterality: N/A;    WOUND DEBRIDEMENT Bilateral 2/2/2024    Procedure: DEBRIDEMENT, WOUND;  Surgeon: Steve Cole MD;  Location: [address];  Service: General;  Laterality: Bilateral;  Bilateral groin  Possible wound vac placement    WOUND DEBRIDEMENT Right 2/6/2024    Procedure: DEBRIDEMENT, WOUND, replace wound vac, possible closure;  Surgeon: Steve Cole MD;  Location: [address];  Service: General;  Laterality: Right;  RLE    WOUND DEBRIDEMENT Right 2/9/2024    Procedure: DEBRIDEMENT, WOUND w wound vac change;  Surgeon: Steve Cole MD;  Location: [address];  Service: General;  Laterality: Right;  RLE    WOUND DEBRIDEMENT Right 2/12/2024    Procedure: R thigh wound debridement;  Surgeon: Mundo Carmona MD;  Location: [address];  Service: General;  Laterality: Right;    WOUND EXPLORATION Right 1/31/2024    Procedure: IRRIGATION & DEBRIDEMENT, WOUND DEBRIDEMENT;  Surgeon: Alvin Junior MD;  Location: [facility] OR;  Service: General;  Laterality: Right;       Review of patient's allergies indicates:  No Known Allergies    Current Facility-Administered Medications on File Prior to Encounter   Medication    [MAR Hold - Suspended Admission] acetaminophen tablet 650 mg    [MAR Hold - Suspended Admission] albuterol-ipratropium 2.5 mg-0.5 mg/3 mL nebulizer solution 3 mL    [MAR  Hold - Suspended Admission] amLODIPine tablet 10 mg    [MAR Hold - Suspended Admission] ascorbic acid (vitamin C) tablet 500 mg    [MAR Hold - Suspended Admission] carvediloL tablet 25 mg    [MAR Hold - Suspended Admission] chlorhexidine 0.12 % solution 15 mL    [MAR Hold - Suspended Admission] DAPTOmycin (CUBICIN) 1,100 mg in sodium chloride 0.9% SolP 50 mL IVPB    [MAR Hold - Suspended Admission] dextrose 10% bolus 125 mL 125 mL    [MAR Hold - Suspended Admission] dextrose 10% bolus 250 mL 250 mL    [MAR Hold - Suspended Admission] glucagon (human recombinant) injection 1 mg    [MAR Hold - Suspended Admission] glucose chewable tablet 16 g    [MAR Hold - Suspended Admission] glucose chewable tablet 24 g    [MAR Hold - Suspended Admission] heparin (porcine) injection 7,500 Units    [MAR Hold - Suspended Admission] hydrALAZINE tablet 25 mg    [MAR Hold - Suspended Admission] insulin aspart U-100 pen 0-10 Units    [MAR Hold - Suspended Admission] insulin aspart U-100 pen 8 Units    [MAR Hold - Suspended Admission] insulin detemir U-100 (Levemir) pen 14 Units    [MAR Hold - Suspended Admission] labetaloL injection 10 mg    [MAR Hold - Suspended Admission] meropenem (MERREM) 2 g in sodium chloride 0.9% 100 mL IVPB    [MAR Hold - Suspended Admission] naloxone 0.4 mg/mL injection 0.02 mg    [MAR Hold - Suspended Admission] ondansetron injection 4 mg    [MAR Hold - Suspended Admission] pantoprazole suspension 40 mg    [MAR Hold - Suspended Admission] prochlorperazine injection Soln 5 mg    [MAR Hold - Suspended Admission] psyllium husk (aspartame) 3.4 gram packet 1 packet    [MAR Hold - Suspended Admission] sevelamer carbonate pwpk 1.6 g    [MAR Hold - Suspended Admission] sodium chloride 0.9% bolus 250 mL 250 mL    [MAR Hold - Suspended Admission] sodium chloride 0.9% flush 10 mL    [MAR Hold - Suspended Admission] zinc sulfate capsule 220 mg    [DISCONTINUED] DAPTOmycin (CUBICIN) 1,040 mg in sodium chloride 0.9% SolP 50  mL IVPB     No current outpatient medications on file prior to encounter.     Family History    None       Tobacco Use    Smoking status: Not on file    Smokeless tobacco: Not on file   Substance and Sexual Activity    Alcohol use: Not on file    Drug use: Not on file    Sexual activity: Not on file     Review of Systems   Unable to perform ROS: Acuity of condition   All other systems reviewed and are negative.    Objective:     Vital Signs (Most Recent):  Temp: 98.8 °F (37.1 °C) (03/08/24 0502)  Pulse: 95 (03/08/24 0529)  Resp: (!) 26 (03/08/24 0529)  BP: (!) 142/65 (03/08/24 0502)  SpO2: 96 % (03/08/24 0529) Vital Signs (24h Range):  Temp:  [98.2 °F (36.8 °C)-100.3 °F (37.9 °C)] 98.8 °F (37.1 °C)  Pulse:  [] 95  Resp:  [15-33] 26  SpO2:  [92 %-100 %] 96 %  BP: (125-180)/(61-82) 142/65     Weight: 129.7 kg (286 lb)  Body mass index is 43.49 kg/m².     Physical Exam  Vitals and nursing note reviewed.   Constitutional:       Appearance: She is obese. She is toxic-appearing.      Comments: Unable to respond or follow commands   HENT:      Head: Atraumatic.   Neck:      Comments: Tracheostomy in place    Cardiovascular:      Rate and Rhythm: Normal rate and regular rhythm.      Pulses: Normal pulses.      Heart sounds: Normal heart sounds.   Pulmonary:      Effort: Pulmonary effort is normal.      Breath sounds: Normal breath sounds.   Abdominal:      General: Abdomen is flat.      Palpations: Abdomen is soft.   Musculoskeletal:         General: Normal range of motion.   Neurological:      General: No focal deficit present.      Mental Status: She is oriented to person, place, and time.   Psychiatric:         Mood and Affect: Mood normal.         Behavior: Behavior normal.                Significant Labs: All pertinent labs within the past 24 hours have been reviewed.    Significant Imaging: I have reviewed all pertinent imaging results/findings within the past 24 hours.    Assessment/Plan:     * Sepsis  53 y.o.  y/o female with a PMHx of Ayaz's gangrene who presented with symptoms of fever with concerns for sepsis. Suspected source likely Cellulitis and UTI. Met SIRS criteria with RR 33, Temp 100.3, WBC 14.11. Started on Daptomycin and Meropenum.     - See Ayaz's gangrene  - Continue Daptomycin and meropenum      UTI (urinary tract infection)  UA significant for WBC >100/hpf with occasion bacteria     - see sepsis      Diarrhea  Rectal tube in place     - Continue psyllium     HTN (hypertension)  -Continue Home amlodipine 10 daily and Coreg 25 BID   - Hydralazine and Labetelol PRN    Dysphagia  Likely 2/2 to CVA. S/p PEG placement. On TF at LTAC    - Nutrition consulted   - Resume Tube feeds      Status post tracheostomy  S/P tracheostomy on 2/22. Per chart review, pulm considering trach exchange early next week.     - Continue trach collar as tolerated  - Wean oxygen for goal spO2>90%  - Continue duonebs prn   - routine trach care  - Suction q4hr       Encephalopathy  Likely 2/2 to embolic CVA during prior admission. CTH 2/3, MRI 2/5 with scattered hypoattenuation likely representing infarcts. EEG findings consistent with moderate-severe encephalopathy.  Repeat CTH and MRI/MRA unchanged from prior exams.LP 2/16. Elevated WBCs and protein consistent with bacterial meningitis. CSF culture 2/19 with no growth    - Delirium precautions  - PT/OT/ST consults    Type 2 diabetes mellitus, with long-term current use of insulin  Initially presented with DKA  A1c 10.4 on prior admission.     -  q4h BG monitoring while NPO per endocrine  - Detemir 14 units daily  - Novolog aspart 8units q4h while on TFs  - Moderate dose SSI PRN      Renal failure  Developed ALVARADO most likley ATN in setting of septic shock; requiring RRT during prior admission. Initially needed hemodialysis s/p tunneled PermCath placement. Showed signs of renal recovery but now worsening w/ Cr at 3.0 on admission.     - Nephrology consult  - Daily Renal Function  Panel   - Avoid Hypotension.  - Renally dose all meds  - Please avoid nephrotoxins, including NSAIDs, aminoglycosides, IV contrast (unless absolutely necessary), gadolinium, fleets and other phosphorous-based laxatives. Caution with antibiotics.      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 After a prolonged hospital course she was discharged to LTAC on 3/7 however is now representing on 3/8 with fever, pyuria, and drainage from her posterior wound. Was seen by ID in LTAC and was started on Daptomycin and Meropenum prior to arrival.  CT A/P showed open wound of the right mons pubis with surrounding subcutaneous stranding and some trace subcutaneous gas concerning for infection.    - Surgery following; appreciate recs  - Continue Daptomycin and Meropenem  - ID consulted; appreciate recs  - Follow up on wound cx, urine cx, blood cx  - Wound care consulted       VTE Risk Mitigation (From admission, onward)           Ordered     heparin (porcine) injection 7,500 Units  Every 8 hours         03/08/24 0331                                    Phill Mar DO  Department of Hospital Medicine  [facility] - Emergency Dept

## 2024-03-08 NOTE — ASSESSMENT & PLAN NOTE
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 After a prolonged hospital course she was discharged to LTAC on 3/7 however is now representing on 3/8 with fever, pyuria, and drainage from her posterior wound. Was seen by ID in LTAC and was started on Daptomycin and Meropenum prior to arrival.  CT A/P showed open wound of the right mons pubis with surrounding subcutaneous stranding and some trace subcutaneous gas concerning for infection.    - Surgery following; appreciate recs  - Continue Daptomycin and Meropenem  - ID consulted; appreciate recs  - Follow up on wound cx, urine cx, blood cx  - Wound care consulted

## 2024-03-08 NOTE — ASSESSMENT & PLAN NOTE
Initially presented with DKA  A1c 10.4 on prior admission.     -  q4h BG monitoring while NPO per endocrine  - Detemir 14 units daily  - Novolog aspart 8units q4h while on TFs  - Moderate dose SSI PRN

## 2024-03-08 NOTE — PLAN OF CARE
Problem: Occupational Therapy  Goal: Occupational Therapy Goal  Description: Goals to be met by: 3/22/2024     Patient will increase functional independence with ADLs by performing:    Grooming while EOB with Maximum Assistance.  Toileting from bed level with Maximum Assistance for hygiene and clothing management.   Sitting at edge of bed x 5 minutes with Maximum Assistance.  Rolling to Bilateral with Maximum Assistance.   Supine to sit with Maximum Assistance.    Outcome: Ongoing, Progressing     Pt evaluated and OT goals established.

## 2024-03-08 NOTE — SUBJECTIVE & OBJECTIVE
No past medical history on file.    Past Surgical History:   Procedure Laterality Date    CLOSURE OF WOUND Right 2/22/2024    Procedure: CLOSURE, WOUND;  Surgeon: Steve Cole MD;  [facility];  Service: General;  Laterality: Right;    ESOPHAGOGASTRODUODENOSCOPY W/ PEG N/A 2/22/2024    Procedure: EGD, WITH PEG TUBE INSERTION;  Surgeon: Steve Cole MD;  Location: [facility] OR 2ND FLR;  Service: General;  Laterality: N/A;    INCISION AND DRAINAGE OF PERIRECTAL REGION N/A 1/29/2024    Procedure: INCISION AND DRAINAGE, PERIRECTAL REGION;  Surgeon: Axel Ramsay MD;  Location: [facility] OR;  Service: General;  Laterality: N/A;    LUMBAR PUNCTURE N/A 2/16/2024    Procedure: Lumbar Puncture;  Surgeon: Macy Boykin;  Location: [facility];  Service: Anesthesiology;  Laterality: N/A;    PLACEMENT, TRIALYSIS CATH Right 1/31/2024    Procedure: INSERTION, CATHETER, TRIPLE LUMEN, HEMODIALYSIS, TEMPORARY;  Surgeon: Alvin Junior MD;  Location: [facility] OR;  Service: General;  Laterality: Right;    REPLACEMENT OF WOUND VACUUM-ASSISTED CLOSURE DEVICE Right 2/12/2024    Procedure: REPLACEMENT, WOUND VAC;  Surgeon: Mundo Carmona MD;  [facility];  Service: General;  Laterality: Right;    REPLACEMENT OF WOUND VACUUM-ASSISTED CLOSURE DEVICE N/A 2/15/2024    Procedure: REPLACEMENT, WOUND VAC;  Surgeon: Steve Cole MD;  [facility];  Service: General;  Laterality: N/A;    REPLACEMENT OF WOUND VACUUM-ASSISTED CLOSURE DEVICE Right 2/19/2024    Procedure: REPLACEMENT, WOUND VAC;  Surgeon: Steve Cole MD;  Location: [facility] OR [facility] FLR;  Service: General;  Laterality: Right;  RLE/groin    REPLACEMENT OF WOUND VACUUM-ASSISTED CLOSURE DEVICE Right 2/22/2024    Procedure: REPLACEMENT, WOUND VAC;  Surgeon: Steve Cole MD;  Location: [facility] OR 2ND FLR;  Service: General;  Laterality: Right;    TRACHEOSTOMY N/A 2/22/2024    Procedure: CREATION, TRACHEOSTOMY;  Surgeon: Steve Cole MD;  Location:  [facility] OR [facility] FLR;  Service: General;  Laterality: N/A;    WOUND DEBRIDEMENT Bilateral 2/2/2024    Procedure: DEBRIDEMENT, WOUND;  Surgeon: Steve Cole MD;  [facility];  Service: General;  Laterality: Bilateral;  Bilateral groin  Possible wound vac placement    WOUND DEBRIDEMENT Right 2/6/2024    Procedure: DEBRIDEMENT, WOUND, replace wound vac, possible closure;  Surgeon: Steve Cole MD;  [facility];  Service: General;  Laterality: Right;  RLE    WOUND DEBRIDEMENT Right 2/9/2024    Procedure: DEBRIDEMENT, WOUND w wound vac change;  Surgeon: Steve Cole MD;  [facility];  Service: General;  Laterality: Right;  RLE    WOUND DEBRIDEMENT Right 2/12/2024    Procedure: R thigh wound debridement;  Surgeon: Mundo Carmona MD;  Location: [facility] OR [address] [place] [address];  Service: General;  Laterality: Right;    WOUND EXPLORATION Right 1/31/2024    Procedure: IRRIGATION & DEBRIDEMENT, WOUND DEBRIDEMENT;  Surgeon: Alvin Junior MD;  Location: [facility];  Service: General;  Laterality: Right;       Review of patient's allergies indicates:  No Known Allergies    Medications:  (Not in a hospital admission)    Antibiotics (From admission, onward)      Start     Stop Route Frequency Ordered    03/08/24 1900  DAPTOmycin (CUBICIN) 1,040 mg in sodium chloride 0.9% SolP 50 mL IVPB         -- IV Every 24 hours (non-standard times) 03/08/24 0334    03/08/24 0700  meropenem (MERREM) 2 g in sodium chloride 0.9% 100 mL IVPB         -- IV Every 12 hours (non-standard times) 03/08/24 0334          Antifungals (From admission, onward)      None          Antivirals (From admission, onward)      None             Immunization History   Administered Date(s) Administered    COVID-19, MRNA, LN-S, PF (Pfizer) (Purple Cap) 05/24/2021, 06/14/2021    PPD Test 08/16/2022       Family History    None       Social History     Socioeconomic History    Marital status: Single     Social Determinants of Health      Financial Resource Strain: Patient Unable To Answer (3/7/2024)    Overall Financial Resource Strain (CARDIA)     Difficulty of Paying Living Expenses: Patient unable to answer   Food Insecurity: Patient Unable To Answer (3/7/2024)    Hunger Vital Sign     Worried About Running Out of Food in the Last Year: Patient unable to answer     Ran Out of Food in the Last Year: Patient unable to answer   Transportation Needs: Patient Unable To Answer (3/7/2024)    PRAPARE - Transportation     Lack of Transportation (Medical): Patient unable to answer     Lack of Transportation (Non-Medical): Patient unable to answer   Stress: Patient Unable To Answer (3/7/2024)    [employer] - Occupational Stress Questionnaire     Feeling of Stress : Patient unable to answer   Housing Stability: Patient Unable To Answer (3/7/2024)    Housing Stability Vital Sign     Unable to Pay for Housing in the Last Year: Patient unable to answer     Unstable Housing in the Last Year: Patient unable to answer     Review of Systems   Unable to perform ROS: Intubated (trache)     Objective:     Vital Signs (Most Recent):  Temp: 98.8 °F (37.1 °C) (03/08/24 0502)  Pulse: 94 (03/08/24 0734)  Resp: 20 (03/08/24 0734)  BP: (!) 158/69 (03/08/24 0734)  SpO2: 100 % (03/08/24 0734) Vital Signs (24h Range):  Temp:  [98.2 °F (36.8 °C)-100.3 °F (37.9 °C)] 98.8 °F (37.1 °C)  Pulse:  [] 94  Resp:  [15-33] 20  SpO2:  [92 %-100 %] 100 %  BP: (125-180)/(61-82) 158/69     Weight: 129.7 kg (286 lb)  Body mass index is 43.49 kg/m².    Estimated Creatinine Clearance: 30.9 mL/min (A) (based on SCr of 3 mg/dL (H)).     Physical Exam  Constitutional:       General: She is not in acute distress.     Appearance: She is well-developed. She is obese. She is ill-appearing. She is not toxic-appearing or diaphoretic.       HENT:      Head: Normocephalic and atraumatic.   Cardiovascular:      Rate and Rhythm: Normal rate and regular rhythm.       Heart sounds: Normal heart sounds. No murmur heard.     No friction rub. No gallop.   Pulmonary:      Effort: Pulmonary effort is normal. No respiratory distress.      Breath sounds: Normal breath sounds. No wheezing or rales.   Abdominal:      General: Bowel sounds are normal. There is no distension.      Palpations: Abdomen is soft. There is no mass.      Tenderness: There is no abdominal tenderness. There is no guarding or rebound.   Skin:     General: Skin is warm and dry.   Neurological:      Mental Status: She is alert.      Comments: Moves spontaneously but does not FC            Significant Labs: Blood Culture:   Recent Labs   Lab 01/29/24  2118 03/07/24  1215 03/07/24  1230 03/07/24  1600 03/07/24  1601   LABBLOO No Growth after 4 days. No Growth to date No Growth to date No Growth to date No Growth to date     CBC:   Recent Labs   Lab 03/07/24  1600 03/08/24  0403   WBC 14.18* 14.11*   HGB 8.9* 8.8*   HCT 29.0* 28.9*   * 489*     CMP:   Recent Labs   Lab 03/07/24  0356 03/07/24  1600 03/08/24  0403    144 146*   K 4.9 5.1 5.0    109 111*   CO2 20* 20* 21*   * 218* 211*   * 124* 130*   CREATININE 2.6* 2.6* 3.0*   CALCIUM 10.2 10.6* 10.2   PROT  --  9.2* 8.9*   ALBUMIN 2.7* 2.9* 2.8*   BILITOT  --  0.2 0.3   ALKPHOS  --  187* 162*   AST  --  36 29   ALT  --  36 35   ANIONGAP 14 15 14     Wound Culture:   Recent Labs   Lab 01/30/24  0219 01/30/24  0228 02/19/24  0820   LABAERO PROTEUS MIRABILIS  Moderate  * PROTEUS MIRABILIS  Moderate  * No growth     All pertinent labs within the past 24 hours have been reviewed.    Significant Imaging: I have reviewed all pertinent imaging results/findings within the past 24 hours.  Procedure Component Value Units Date/Time   CT Abdomen Pelvis With IV Contrast NO Oral Contrast [[id number]] (Abnormal) Resulted: 03/07/24 2316   Order Status: Completed Updated: 03/07/24 2318   Narrative:     EXAMINATION:  CT ABDOMEN PELVIS WITH IV  CONTRAST    CLINICAL HISTORY:  Ayaz's gangrene;    TECHNIQUE:  Low dose axial images were obtained from the lung bases to the pubic symphysis following the intravenous administration of 100cc of Omnipaque 350.  Sagittal and coronal reformats were provided.    COMPARISON:  Chest radiograph 03/07/2024, 03/05/2024    Radiograph tube check 02/02/2024    Retroperitoneal ultrasound    CT abdomen pelvis 01/29/2024    FINDINGS:  Abdomen CT and pelvis CT:    Artifacts related to motion and/or beam hardening degrade numerous images.    Heart: Partially visualized central venous catheter terminating in the right atrium.  Prominent cardiac silhouette.  Visualized portions of the pericardium demonstrate no discrete fluid collections.    Lung bases/airways: Evaluation of the lung parenchyma limited due to respiratory motion.  Bilateral lower lobe pulmonary opacities, predominantly linear and bandlike, most likely represent atelectasis.  No consolidation, pleural effusion, discrete pulmonary or pleural mass, or pneumothorax apparent in the visualized lower chest.    Liver: Enlarged measuring 23 cm in craniocaudal dimension.  No discrete hepatic mass apparent on these portal venous phase images.    Gallbladder: Normal in appearance without evidence for cholecystitis.    Bile Ducts: No intra or extrahepatic biliary ductal dilation.    Pancreas: No pancreatic mass lesion or peripancreatic inflammatory change.    Spleen: Unremarkable.   1.4 cm accessory splenule.    Adrenals: Unremarkable.    Kidneys/ Ureters: Kidneys are normal in size and enhance symmetrically.  No nephrolithiasis or hydroureteronephrosis.    Bladder: Bladder is decompressed around a Glynn catheter.  Allowing for this, some bladder wall thickening is nevertheless suggested.  There is also some faint pericystic stranding.    Reproductive organs: Unremarkable.    GI Tract/Mesentery: Percutaneous gastric tube in the gastric body.  Visualized loops of small and  large bowel are normal in caliber without evidence for obstruction or inflammation.   Enteric contrast throughout the colon.  Normal appendix.    No abdominopelvic ascites, intraperitoneal free air.    Enlarged external iliac chain lymph nodes, right greater than left measuring up to 0.8 cm in short axis.    Abdominal wall/extraperitoneal soft tissues: Numerous anterior abdominal wall foci of subcutaneous stranding may represent sites of prior subcutaneous injection.    Small fat-containing inguinal hernia.    Skin and subcutaneous soft tissue defect, likely an open wound, with adjacent fat stranding, in the right mons pubis.  The subcutaneous fat stranding extends superolaterally approximately 10.0 cm.  There are few foci of subcutaneous emphysema (series 2 image 203).  This could represent a necrotizing soft tissue infection.    Vasculature: Abdominal aorta is normal in caliber, contour, and course without significant calcific atherosclerosis.    Portal veins, SMV and splenic vein are patent.    Bones: No acute displaced fracture.   Impression:       Abdomen CT and Pelvis CT:    1. Open wound of the right mons pubis with surrounding subcutaneous stranding and some trace subcutaneous gas.  Infection is favored.  Necrotizing infection not excluded.  Underlying neoplasm not excluded.  Correlate clinically.  2. Possible cystitis.  Correlate clinically.  No CT evidence of upper urinary tract infection at this time.  3. Radiopaque material in the intergluteal cleft possibly represents contrast evacuated from the rectum.  Correlate clinically for possible rectal incontinence.  4. Bibasilar pulmonary opacities most likely represent subsegmental atelectasis and scarring.  Pneumonia or other pathology not excluded.  Correlate clinically; consider follow-up chest CT in 3-6 months to confirm resolution and help further exclude neoplasm.  5. Additional observations as detailed in the body of the report.  This report was  flagged in Epic as abnormal.    Electronically signed by resident: Judy Deng  Date: 03/07/2024  Time: 21:16    Electronically signed by: Taras Miller  Date: 03/07/2024  Time: 23:16   X-Ray Chest AP Portable [[id number]] Resulted: 03/07/24 1556   Order Status: Completed Updated: 03/07/24 1558   Narrative:     EXAMINATION:  XR CHEST AP PORTABLE    CLINICAL HISTORY:  Sepsis;    TECHNIQUE:  Single frontal view of the chest was performed.    COMPARISON:  03/05/2024    FINDINGS:  Tracheostomy tube.  Right central line distal tip in the SVC.    The cardiac silhouette is normal in size.  The pulmonary vascularity is normal.    The lungs are clear.  No pleural effusion, no pneumothorax seen.    The osseous structures demonstrate no acute findings.   Impression:       No definite acute intrathoracic process seen.      Electronically signed by: Cami Jeffrey MD  Date: 03/07/2024  Time: 15:56     Imaging History    2024    Date Procedure Name Study Review Link PACS Link Status Accession Number Location   03/07/24 08:59 PM CT Abdomen Pelvis With IV Contrast NO Oral Contrast Study Review  Images Final [id number] [facility]   03/07/24 03:16 PM X-Ray Chest AP Portable Study Review  Images Final [id number] [facility]   03/05/24 09:03 PM X-Ray Chest AP Portable Study Review  Images Final [id number] [facility]   02/29/24 03:50 PM IR Tunneled Catheter Insert w/o Port Study Review  Images Final [id number] [facility]   02/18/24 07:27 AM X-Ray Chest AP Portable Study Review  Images Final [id number] [facility]   02/17/24 08:09 AM X-Ray Chest AP Portable Study Review  Images Final [id number] [facility]   02/16/24 04:15 PM FL Lumbar Puncture (xpd) Study Review  Images Final [id number] [facility]   02/15/24 06:18 AM X-Ray Chest AP Portable Study Review  Images Final [id number] [facility]   02/13/24 09:50 PM X-Ray Chest AP Portable Study Review  Images Final [id number] [facility]   02/12/24 06:15 AM X-Ray Chest AP Portable Study Review  Images Final [id number] [facility]   02/12/24 12:22 AM X-Ray  Chest AP Portable Study Review  Images Final [id number] [facility]   02/11/24 04:25 AM X-Ray Chest AP Portable Study Review  Images Final [id number] [facility]   02/10/24 12:53 PM MRV Brain Without Contrast Study Review  Images Final [id number] [facility]   02/10/24 12:49 PM MRA Brain without contrast Study Review  Images Final [id number] [facility]   02/10/24 12:47 PM MRI Brain Without Contrast Study Review  Images Final [id number] [facility]   02/10/24 05:07 AM X-Ray Chest AP Portable Study Review  Images Final [id number] [facility]   02/09/24 06:35 PM XR NG/OG tube placement check, non-radiologist performed Study Review  Images Final [id number] [facility]   02/07/24 11:39 PM CT Head Without Contrast Study Review  Images Final [id number] [facility]   02/07/24 04:07 PM CT Head Without Contrast Study Review  Images Final [id number] [facility]   02/07/24 05:41 AM X-Ray Chest AP Portable Study Review  Images Final [id number] [facility]   02/06/24 03:52 PM CTA Head and Neck (xpd) Study Review  Images Final [id number] [facility]   02/06/24 02:18 PM X-Ray Chest AP Portable Study Review  Images Final [id number] [facility]   02/06/24 12:41 PM X-Ray Chest AP Portable Study Review  Images Final [id number] [facility]   02/06/24 09:30 AM MRI Brain Without Contrast Study Review  Images Final [id number] [facility]   03/05/24 07:43 AM CARDIAC MONITORING STRIPS Study Review  Final     03/04/24 09:55 PM CARDIAC MONITORING STRIPS Study Review  Final     03/04/24 07:26 AM CARDIAC MONITORING STRIPS Study Review  Final     03/04/24 03:02 AM CARDIAC MONITORING STRIPS Study Review  Final     03/03/24 07:58 AM CARDIAC MONITORING STRIPS Study Review  Final     03/03/24 04:27 AM CARDIAC MONITORING STRIPS Study Review  Final     03/02/24 04:42 AM CARDIAC MONITORING STRIPS Study Review  Final     03/01/24 05:22 PM CARDIAC MONITORING STRIPS Study Review  Final     03/01/24 03:26 AM CARDIAC MONITORING STRIPS Study Review  Final     02/29/24 04:12 PM CARDIAC MONITORING STRIPS Study Review  Final     02/29/24 07:35 AM CARDIAC MONITORING  STRIPS Study Review  Final     02/29/24 12:06 AM CARDIAC MONITORING STRIPS Study Review  Final     02/27/24 07:38 PM CARDIAC MONITORING STRIPS Study Review  Final     02/27/24 08:04 AM CARDIAC MONITORING STRIPS Study Review  Final     02/26/24 07:46 PM CARDIAC MONITORING STRIPS Study Review  Final     02/25/24 09:47 AM CARDIAC MONITORING STRIPS Study Review  Final     02/24/24 02:41 PM CARDIAC MONITORING STRIPS Study Review  Final     02/23/24 02:38 PM CARDIAC MONITORING STRIPS Study Review  Final     02/22/24 09:32 PM CARDIAC MONITORING STRIPS Study Review  Final     02/22/24 10:42 AM CARDIAC MONITORING STRIPS Study Review  Final     02/21/24 09:39 PM CARDIAC MONITORING STRIPS Study Review  Final     02/21/24 05:49 PM CARDIAC MONITORING STRIPS Study Review  Final     02/20/24 07:15 PM CARDIAC MONITORING STRIPS Study Review  Final     02/20/24 11:26 AM CARDIAC MONITORING STRIPS Study Review  Final     02/19/24 11:09 PM CARDIAC MONITORING STRIPS Study Review  Final     02/19/24 07:53 AM CARDIAC MONITORING STRIPS Study Review  Final     02/18/24 10:33 PM CARDIAC MONITORING STRIPS Study Review  Final     02/18/24 07:42 AM CARDIAC MONITORING STRIPS Study Review  Final     02/18/24 05:21 AM CARDIAC MONITORING STRIPS Study Review  Final     02/17/24 09:37 AM CARDIAC MONITORING STRIPS Study Review  Final     02/17/24 06:36 AM CARDIAC MONITORING STRIPS Study Review  Final     02/16/24 05:01 PM CARDIAC MONITORING STRIPS Study Review  Final     02/15/24 06:11 PM CARDIAC MONITORING STRIPS Study Review  Final     02/15/24 03:26 AM CARDIAC MONITORING STRIPS Study Review  Final     02/14/24 04:30 PM CARDIAC MONITORING STRIPS Study Review  Final     02/13/24 07:05 PM CARDIAC MONITORING STRIPS Study Review  Final     02/13/24 11:55 AM CARDIAC MONITORING STRIPS Study Review  Final     02/12/24 07:14 PM CARDIAC MONITORING STRIPS Study Review  Final     02/12/24 07:07 AM CARDIAC MONITORING STRIPS Study Review  Final     02/11/24 07:18  PM CARDIAC MONITORING STRIPS Study Review  Final     02/10/24 07:25 PM CARDIAC MONITORING STRIPS Study Review  Final     02/08/24 08:24 PM CARDIAC MONITORING STRIPS Study Review  Final     02/07/24 07:13 AM CARDIAC MONITORING STRIPS Study Review  Final     02/06/24 09:51 PM CARDIAC MONITORING STRIPS Study Review  Final     02/06/24 09:20 AM CARDIAC MONITORING STRIPS Study Review  Final     02/06/24 05:39 AM CARDIAC MONITORING STRIPS Study Review  Final     02/06/24 02:30 PM Echo Study Review  Images Final [id number] [facility]   03/06/24 10:40 AM Intra-Procedure Documentation Study Review  Images Final [id number] [facility]

## 2024-03-08 NOTE — NURSING
Patient arrived in a stretcher to dialysis unit.   Report received from MARNIE Hyman RN.    VS's per dialysis Flowsheet.     Hemodialysis tx initiated via Right Tunneled catheter.      Refer to dialysis flowsheet and MAR for details.

## 2024-03-08 NOTE — PROVIDER PROGRESS NOTES - EMERGENCY DEPT.
Encounter Date: 3/7/2024    ED Physician Progress Notes        Physician Note:   11:00 PM  Patient received in sign-out from Dr. Andujar pending CT abdomen pelvis and admission to Hospital Medicine.  Briefly, 53-year-old female with complicated medical history presenting today for fevers .  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    Patient has been seen by General surgery, they recommended CT abdomen pelvis with contrast which has been performed.    There is evidence of the open wound on the mons with surrounding subcutaneous stranding and subcutaneous air.  She is received antibiotics.    12:00 AM  General surgery paged to update them with findings of the CT scan.    General surgery aware of the CT findings, recommends admission to Hospital Medicine.  If anything, would be at bedside debridement.  Admit to Dr. Jean for further treatment and evaluation.    MARNIE Maciel MD  Staff ED Physician  03/08/2024 1:56 AM

## 2024-03-08 NOTE — PLAN OF CARE
Problem: Physical Therapy  Goal: Physical Therapy Goal  Description: Goals to be met by: 3/22/2024     Patient will increase functional independence with mobility by performin. Pt will tolerate full ROM x10 to B UE and LE with no withdraw or signs of significant discomfort   2. Pt will follow 3 different 1 step verbal or demonstrated commands   3. Pt will tolerate sitting position for 5 mins with no significant signs of agitation with VSS   4. Pt will tolerate rolling L and R with total assistance with VSS     Outcome: Ongoing, Progressing     Pt evaluated and appropriate goals established.

## 2024-03-08 NOTE — ASSESSMENT & PLAN NOTE
53 y.o. y/o female with a PMHx of Ayaz's gangrene who presented with symptoms of fever with concerns for sepsis. Suspected source likely Cellulitis and UTI. Met SIRS criteria with RR 33, Temp 100.3, WBC 14.11. Started on Daptomycin and Meropenum.     - See Ayaz's gangrene  - Continue Daptomycin and meropenum

## 2024-03-08 NOTE — ASSESSMENT & PLAN NOTE
Developed ALVARADO most likley ATN in setting of septic shock; requiring RRT during prior admission. Initially needed hemodialysis s/p tunneled PermCath placement. Showed signs of renal recovery but now worsening w/ Cr at 3.0 on admission.     - Nephrology consult  - Daily Renal Function Panel   - Avoid Hypotension.  - Renally dose all meds  - Please avoid nephrotoxins, including NSAIDs, aminoglycosides, IV contrast (unless absolutely necessary), gadolinium, fleets and other phosphorous-based laxatives. Caution with antibiotics.

## 2024-03-08 NOTE — ASSESSMENT & PLAN NOTE
53 y.o. y/o female with a PMHx of Ayaz's gangrene who presented with symptoms of fever with concerns for sepsis. Suspected source likely Cellulitis and UTI. Met SIRS criteria with RR 33, Temp 100.3, WBC 14.11. Started on Daptomycin and Meropenum.     - See Ayaz's gangrene

## 2024-03-08 NOTE — ASSESSMENT & PLAN NOTE
Nutrition consulted. Most recent weight and BMI monitored-     Measurements:  Wt Readings from Last 1 Encounters:   03/07/24 129.7 kg (286 lb)   Body mass index is 43.49 kg/m².    Patient has been screened and assessed by RD.    Malnutrition Type:  Context:    Level:      Malnutrition Characteristic Summary:       Interventions/Recommendations (treatment strategy):

## 2024-03-08 NOTE — CONSULTS
[facility] - Emergency Dept  General Surgery  Consult Note    Inpatient consult to General Surgery  Consult performed by: Enzo Bagley MD  Consult ordered by: Negra Montana PA-C        Subjective:     Chief Complaint/Reason for Admission: Fever    History of Present Illness: Ms. Saravia is a 52yo female respiratory failure now s/p tracheostomy, gastrostomy tube placement, h/o forniers gangrene s/p debridement and wound vac placement and closure of posterior wound, ALVARADO requiring RRT for a period, embolic infarcts on MRI without great source explained, altered mental status who was discharged on 3/6 to LTAC.  She is now representing from the LTAC for drainage from her posterior incision site, pyurea, and fever.    Workup so far in the ED shows a leukocytosis of 14.  H/H stable.  Renal panel with hyperkalemia of 5.1 and Cr of 2.6.  CRP mildly elevated to 27.5.  UA with many bacteria, WBC 29, and RBC >100.  Currently on trach collar of 10L.  General surgery consulted for evaluation of her wounds.    Current Facility-Administered Medications on File Prior to Encounter   Medication    [MAR Hold - Suspended Admission] acetaminophen tablet 650 mg    [MAR Hold - Suspended Admission] albuterol-ipratropium 2.5 mg-0.5 mg/3 mL nebulizer solution 3 mL    [MAR Hold - Suspended Admission] amLODIPine tablet 10 mg    [MAR Hold - Suspended Admission] ascorbic acid (vitamin C) tablet 500 mg    [MAR Hold - Suspended Admission] carvediloL tablet 25 mg    [MAR Hold - Suspended Admission] chlorhexidine 0.12 % solution 15 mL    [MAR Hold - Suspended Admission] DAPTOmycin (CUBICIN) 1,100 mg in sodium chloride 0.9% SolP 50 mL IVPB    [MAR Hold - Suspended Admission] dextrose 10% bolus 125 mL 125 mL    [MAR Hold - Suspended Admission] dextrose 10% bolus 250 mL 250 mL    [MAR Hold - Suspended Admission] glucagon (human recombinant) injection 1 mg    [MAR Hold - Suspended Admission] glucose chewable tablet 16 g    [MAR Hold - Suspended  Admission] glucose chewable tablet 24 g    [MAR Hold - Suspended Admission] heparin (porcine) injection 7,500 Units    [MAR Hold - Suspended Admission] hydrALAZINE tablet 25 mg    [MAR Hold - Suspended Admission] insulin aspart U-100 pen 0-10 Units    [MAR Hold - Suspended Admission] insulin aspart U-100 pen 8 Units    [MAR Hold - Suspended Admission] insulin detemir U-100 (Levemir) pen 14 Units    [MAR Hold - Suspended Admission] labetaloL injection 10 mg    [MAR Hold - Suspended Admission] meropenem (MERREM) 2 g in sodium chloride 0.9% 100 mL IVPB    [MAR Hold - Suspended Admission] naloxone 0.4 mg/mL injection 0.02 mg    [MAR Hold - Suspended Admission] ondansetron injection 4 mg    [MAR Hold - Suspended Admission] pantoprazole suspension 40 mg    [MAR Hold - Suspended Admission] prochlorperazine injection Soln 5 mg    [MAR Hold - Suspended Admission] psyllium husk (aspartame) 3.4 gram packet 1 packet    [MAR Hold - Suspended Admission] sevelamer carbonate pwpk 1.6 g    [MAR Hold - Suspended Admission] sodium chloride 0.9% bolus 250 mL 250 mL    [MAR Hold - Suspended Admission] sodium chloride 0.9% flush 10 mL    [COMPLETED] sodium zirconium cyclosilicate packet 10 g    [MAR Hold - Suspended Admission] zinc sulfate capsule 220 mg    [DISCONTINUED] DAPTOmycin (CUBICIN) 1,040 mg in sodium chloride 0.9% SolP 50 mL IVPB     No current outpatient medications on file prior to encounter.       Review of patient's allergies indicates:  No Known Allergies    No past medical history on file.  Past Surgical History:   Procedure Laterality Date    CLOSURE OF WOUND Right 2/22/2024    Procedure: CLOSURE, WOUND;  Surgeon: Steve Cole MD;  Location: [address];  Service: General;  Laterality: Right;    ESOPHAGOGASTRODUODENOSCOPY W/ PEG N/A 2/22/2024    Procedure: EGD, WITH PEG TUBE INSERTION;  Surgeon: Steve Cole MD;  Location: [facility] OR [address] [place] [address];  Service: General;  Laterality: N/A;    INCISION AND DRAINAGE OF  PERIRECTAL REGION N/A 1/29/2024    Procedure: INCISION AND DRAINAGE, PERIRECTAL REGION;  Surgeon: Axel Ramsay MD;  Location: [facility] OR;  Service: General;  Laterality: N/A;    LUMBAR PUNCTURE N/A 2/16/2024    Procedure: Lumbar Puncture;  Surgeon: Macy Boykin;  Location: [facility];  Service: Anesthesiology;  Laterality: N/A;    PLACEMENT, TRIALYSIS CATH Right 1/31/2024    Procedure: INSERTION, CATHETER, TRIPLE LUMEN, HEMODIALYSIS, TEMPORARY;  Surgeon: Alvin Junior MD;  Location: [facility] OR;  Service: General;  Laterality: Right;    REPLACEMENT OF WOUND VACUUM-ASSISTED CLOSURE DEVICE Right 2/12/2024    Procedure: REPLACEMENT, WOUND VAC;  Surgeon: Mundo Carmona MD;  [facility];  Service: General;  Laterality: Right;    REPLACEMENT OF WOUND VACUUM-ASSISTED CLOSURE DEVICE N/A 2/15/2024    Procedure: REPLACEMENT, WOUND VAC;  Surgeon: Steve Cole MD;  Location: [facility] OR [facility];  Service: General;  Laterality: N/A;    REPLACEMENT OF WOUND VACUUM-ASSISTED CLOSURE DEVICE Right 2/19/2024    Procedure: REPLACEMENT, WOUND VAC;  Surgeon: Steve Cole MD;  [facility];  Service: General;  Laterality: Right;  RLE/groin    REPLACEMENT OF WOUND VACUUM-ASSISTED CLOSURE DEVICE Right 2/22/2024    Procedure: REPLACEMENT, WOUND VAC;  Surgeon: Steve Cole MD;  [facility];  Service: General;  Laterality: Right;    TRACHEOSTOMY N/A 2/22/2024    Procedure: CREATION, TRACHEOSTOMY;  Surgeon: Steve Cole MD;  Location: [facility] OR [facility];  Service: General;  Laterality: N/A;    WOUND DEBRIDEMENT Bilateral 2/2/2024    Procedure: DEBRIDEMENT, WOUND;  Surgeon: Steve Cole MD;  Location: [facility] OR [facility];  Service: General;  Laterality: Bilateral;  Bilateral groin  Possible wound vac placement    WOUND DEBRIDEMENT Right 2/6/2024    Procedure: DEBRIDEMENT, WOUND, replace wound vac, possible closure;  Surgeon: Steve Cole MD;  Location: [facility] OR [facility];  Service: General;   Laterality: Right;  RLE    WOUND DEBRIDEMENT Right 2/9/2024    Procedure: DEBRIDEMENT, WOUND w wound vac change;  Surgeon: Steve Cole MD;  [facility];  Service: General;  Laterality: Right;  RLE    WOUND DEBRIDEMENT Right 2/12/2024    Procedure: R thigh wound debridement;  Surgeon: Mundo Carmona MD;  [facility];  Service: General;  Laterality: Right;    WOUND EXPLORATION Right 1/31/2024    Procedure: IRRIGATION & DEBRIDEMENT, WOUND DEBRIDEMENT;  Surgeon: Alvin Junior MD;  Location: [facility] OR;  Service: General;  Laterality: Right;     Family History    None       Tobacco Use    Smoking status: Not on file    Smokeless tobacco: Not on file   Substance and Sexual Activity    Alcohol use: Not on file    Drug use: Not on file    Sexual activity: Not on file     Review of Systems   Unable to perform ROS: Patient nonverbal     Objective:     Vital Signs (Most Recent):  Temp: 98.5 °F (36.9 °C) (03/07/24 1402)  Pulse: 100 (03/07/24 1402)  Resp: (!) 26 (03/07/24 1402)  BP: 125/77 (03/07/24 1402)  SpO2: (!) 94 % (03/07/24 1515) Vital Signs (24h Range):  Temp:  [98.2 °F (36.8 °C)-100.3 °F (37.9 °C)] 98.5 °F (36.9 °C)  Pulse:  [] 100  Resp:  [15-33] 26  SpO2:  [94 %-100 %] 94 %  BP: (125-153)/(65-80) 125/77     Weight: 129.7 kg (286 lb)  Body mass index is 43.49 kg/m².      Intake/Output Summary (Last 24 hours) at 3/7/2024 1840  Last data filed at 3/7/2024 1200  Gross per 24 hour   Intake 1220 ml   Output 1000 ml   Net 220 ml       Physical Exam  Constitutional:       Comments: Does not follow commands or respond   HENT:      Head: Normocephalic.   Cardiovascular:      Rate and Rhythm: Normal rate and regular rhythm.   Pulmonary:      Effort: Pulmonary effort is normal. No respiratory distress.      Comments: Tracheostomy in place on trach collar  Abdominal:      General: There is no distension.   Genitourinary:     Comments: Glynn catheter in place  Musculoskeletal:       Comments: Occassional nonpurposeful movement of her upper extremities   Skin:     Comments: Right posterior groin wound with pinrose drain in place, drainage coming from pinrose tract  Anterior wound unremarkable at this time   Neurological:      Comments: Nonverbal, does not follow commands, does not react       Significant Labs:  Recent Lab Results  (Last 5 results in the past 24 hours)        03/07/24  1822   03/07/24  1607   03/07/24  1600   03/07/24  1411   03/07/24  1330        Albumin     2.9           ALP     187           Allens Test   N/A             ALT     36           Anion Gap     15           Appearance, UA Hazy         Hazy       AST     36           Bacteria, UA Occasional         Many       Baso #     0.04           Basophil %     0.3           Bilirubin (UA) Negative         Negative       BILIRUBIN TOTAL     0.2  Comment: For infants and newborns, interpretation of results should be based  on gestational age, weight and in agreement with clinical  observations.    Premature Infant recommended reference ranges:  Up to 24 hours.............<8.0 mg/dL  Up to 48 hours............<12.0 mg/dL  3-5 days..................<15.0 mg/dL  6-29 days.................<15.0 mg/dL             Site   Other             BUN     124           Calcium     10.6           Chloride     109           CO2     20           Color, UA Yellow         Lillian       Creatinine     2.6           CRP     27.5           Differential Method     Automated           eGFR     21.4           Eos #     0.8           Eos %     5.3           Glucose     218           Glucose, UA Negative         Negative       Gran # (ANC)     8.8           Gran %     62.1           Hematocrit     29.0           Hemoglobin     8.9           Hepatitis C Ab     Non-reactive           Hyaline Casts, UA 1         0       Immature Grans (Abs)     0.09  Comment: Mild elevation in immature granulocytes is non specific and   can be seen in a variety of conditions  including stress response,   acute inflammation, trauma and pregnancy. Correlation with other   laboratory and clinical findings is essential.             Immature Granulocytes     0.6           Ketones, UA Negative         Negative       Leukocyte Esterase, UA 1+         2+       Lymph #     3.2           Lymph %     22.7           Magnesium      2.3           MCH     28.4           MCHC     30.7           MCV     93           Microscopic Comment SEE COMMENT  Comment: Other formed elements not mentioned in the report are not   present in the microscopic examination.            SEE COMMENT  Comment: Other formed elements not mentioned in the report are not   present in the microscopic examination.          Mono #     1.3           Mono %     9.0           MPV     10.3           NITRITE UA Negative         Negative       nRBC     0           Blood, UA 3+         3+       QRS Duration       142         [facility] QTC Calculation       497         pH, UA 6.0         6.0       Phosphorus Level     6.1           Platelet Count     522           POC Lactate   1.68             Potassium     5.1           PROTEIN TOTAL     9.2           Protein, UA 2+  Comment: Recommend a 24 hour urine protein or a urine   protein/creatinine ratio if globulin induced proteinuria is  clinically suspected.           2+  Comment: Recommend a 24 hour urine protein or a urine   protein/creatinine ratio if globulin induced proteinuria is  clinically suspected.         RBC     3.13           RBC, UA >100         >100       RDW     16.7           Sample   VENOUS             Sodium     144           Specific Gravity, UA 1.020         1.020       Specimen UA Urine, Catheterized         Urine, Catheterized       Squam Epithel, UA 0         1       WBC, UA 28         29       WBC     14.18                                  Significant Diagnostics:  I have reviewed all pertinent imaging results/findings within the past 24 hours.    Assessment/Plan:     Ms.  Manjit is a 52yo female well known to the surgical service.  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  After a prolonged hospital course she was discharged to LTAC on 3/7 however is now representing on 3/8 with fever, pyuria, and drainage from her posterior wound.    Recommendations:  - Given her medical history would be best served on a medicine service to care for her comorbidities  - Obtain a CT abd/pel with IV contrast to evaluate wounds.  Her posterior wound is currently draining and has a pinrose drain in place.  May consider removal of sutures with wet to dry dressing pending CT imaging.  - Would do a full infectious workup, urine cultures, blood cultures, wound culture  - Infectious disease consult given her history of infections and to help with tailoring of antibiotics    Thank you for your consult. I will follow-up with patient. Please contact us if you have any additional questions.    Enzo Bagley MD  General Surgery  [facility] - Emergency Dept

## 2024-03-08 NOTE — ASSESSMENT & PLAN NOTE
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 After a prolonged hospital course she was discharged to LTAC on 3/7 however is now representing on 3/8 with fever, pyuria, and drainage from her posterior wound. Was seen by ID in LTAC and was started on Daptomycin and Meropenum prior to arrival.  CT A/P showed open wound of the right mons pubis with surrounding subcutaneous stranding and some trace subcutaneous gas concerning for infection.    - Surgery following; f/u cultures, no plans for formal debridement at this time  - Continue Daptomycin and Meropenem  - ID consulted; continue abx  - Follow up on wound cx GNR f/u spec and shireen, urine cx NGTD, blood cx NGTD  - Wound care consulted

## 2024-03-08 NOTE — ASSESSMENT & PLAN NOTE
Likely 2/2 to CVA. S/p PEG placement. On TF at LTAC    - Nutrition consulted   - Resume Tube feeds

## 2024-03-08 NOTE — SUBJECTIVE & OBJECTIVE
No past medical history on file.    Past Surgical History:   Procedure Laterality Date    CLOSURE OF WOUND Right 2/22/2024    Procedure: CLOSURE, WOUND;  Surgeon: Steve Cole MD;  [facility];  Service: General;  Laterality: Right;    ESOPHAGOGASTRODUODENOSCOPY W/ PEG N/A 2/22/2024    Procedure: EGD, WITH PEG TUBE INSERTION;  Surgeon: Steve Cole MD;  Location: [facility] OR 2ND FLR;  Service: General;  Laterality: N/A;    INCISION AND DRAINAGE OF PERIRECTAL REGION N/A 1/29/2024    Procedure: INCISION AND DRAINAGE, PERIRECTAL REGION;  Surgeon: Axel Ramsay MD;  Location: [facility] OR;  Service: General;  Laterality: N/A;    LUMBAR PUNCTURE N/A 2/16/2024    Procedure: Lumbar Puncture;  Surgeon: Macy Boykin;  Location: [facility];  Service: Anesthesiology;  Laterality: N/A;    PLACEMENT, TRIALYSIS CATH Right 1/31/2024    Procedure: INSERTION, CATHETER, TRIPLE LUMEN, HEMODIALYSIS, TEMPORARY;  Surgeon: Alvin Junior MD;  Location: [facility] OR;  Service: General;  Laterality: Right;    REPLACEMENT OF WOUND VACUUM-ASSISTED CLOSURE DEVICE Right 2/12/2024    Procedure: REPLACEMENT, WOUND VAC;  Surgeon: Mundo Carmona MD;  [facility];  Service: General;  Laterality: Right;    REPLACEMENT OF WOUND VACUUM-ASSISTED CLOSURE DEVICE N/A 2/15/2024    Procedure: REPLACEMENT, WOUND VAC;  Surgeon: Steve Cole MD;  [facility];  Service: General;  Laterality: N/A;    REPLACEMENT OF WOUND VACUUM-ASSISTED CLOSURE DEVICE Right 2/19/2024    Procedure: REPLACEMENT, WOUND VAC;  Surgeon: Steve Cole MD;  Location: [facility] OR [facility];  Service: General;  Laterality: Right;  RLE/groin    REPLACEMENT OF WOUND VACUUM-ASSISTED CLOSURE DEVICE Right 2/22/2024    Procedure: REPLACEMENT, WOUND VAC;  Surgeon: Steve Cole MD;  Location: [facility] OR 2ND FLR;  Service: General;  Laterality: Right;    TRACHEOSTOMY N/A 2/22/2024    Procedure: CREATION, TRACHEOSTOMY;  Surgeon: Steve Cole MD;  Location:  [facility] OR 2ND FLR;  Service: General;  Laterality: N/A;    WOUND DEBRIDEMENT Bilateral 2/2/2024    Procedure: DEBRIDEMENT, WOUND;  Surgeon: Steve Cole MD;  [facility];  Service: General;  Laterality: Bilateral;  Bilateral groin  Possible wound vac placement    WOUND DEBRIDEMENT Right 2/6/2024    Procedure: DEBRIDEMENT, WOUND, replace wound vac, possible closure;  Surgeon: Steve Cole MD;  [facility];  Service: General;  Laterality: Right;  RLE    WOUND DEBRIDEMENT Right 2/9/2024    Procedure: DEBRIDEMENT, WOUND w wound vac change;  Surgeon: Steve Cole MD;  [facility];  Service: General;  Laterality: Right;  RLE    WOUND DEBRIDEMENT Right 2/12/2024    Procedure: R thigh wound debridement;  Surgeon: Mundo Carmona MD;  Location: [facility] OR [address] [place] [address];  Service: General;  Laterality: Right;    WOUND EXPLORATION Right 1/31/2024    Procedure: IRRIGATION & DEBRIDEMENT, WOUND DEBRIDEMENT;  Surgeon: Alvin Junior MD;  Location: [facility];  Service: General;  Laterality: Right;       Review of patient's allergies indicates:  No Known Allergies  Current Facility-Administered Medications   Medication Frequency    acetaminophen oral solution 650 mg Q4H PRN    albuterol-ipratropium 2.5 mg-0.5 mg/3 mL nebulizer solution 3 mL Q4H PRN    amLODIPine tablet 10 mg Daily    ascorbic acid (vitamin C) tablet 500 mg BID    carvediloL tablet 25 mg BID    chlorhexidine 0.12 % solution 15 mL BID    DAPTOmycin (CUBICIN) 1,040 mg in sodium chloride 0.9% SolP 50 mL IVPB Q24H    dextrose 10% bolus 125 mL 125 mL PRN    dextrose 10% bolus 250 mL 250 mL PRN    glucagon (human recombinant) injection 1 mg PRN    heparin (porcine) injection 7,500 Units Q8H    hydrALAZINE tablet 25 mg Q8H PRN    insulin aspart U-100 pen 0-10 Units Q4H PRN    insulin aspart U-100 pen 8 Units Q4H    insulin detemir U-100 (Levemir) pen 14 Units Daily    labetalol 20 mg/4 mL (5 mg/mL) IV syring Q6H PRN    meropenem  (MERREM) 2 g in sodium chloride 0.9% 100 mL IVPB Q12H    mupirocin 2 % ointment BID    naloxone 0.4 mg/mL injection 0.02 mg PRN    ondansetron injection 4 mg Q6H PRN    pantoprazole suspension 40 mg Daily    prochlorperazine injection Soln 5 mg Q6H PRN    psyllium husk (aspartame) 3.4 gram packet 1 packet Daily    sevelamer carbonate pwpk 1.6 g TID    sodium chloride 0.9% flush 10 mL Q12H PRN    zinc sulfate capsule 220 mg Daily     No current outpatient medications on file.     Family History    None       Tobacco Use    Smoking status: Unknown    Smokeless tobacco: Not on file   Substance and Sexual Activity    Alcohol use: Not on file    Drug use: Not on file    Sexual activity: Not on file     Review of Systems  Objective:     Vital Signs (Most Recent):  Temp: 98.8 °F (37.1 °C) (03/08/24 0502)  Pulse: 98 (03/08/24 1303)  Resp: 18 (03/08/24 0932)  BP: (!) 152/72 (03/08/24 1303)  SpO2: 98 % (03/08/24 1033) Vital Signs (24h Range):  Temp:  [98.2 °F (36.8 °C)-100.3 °F (37.9 °C)] 98.8 °F (37.1 °C)  Pulse:  [] 98  Resp:  [18-33] 18  SpO2:  [92 %-100 %] 98 %  BP: (125-180)/(61-82) 152/72     Weight: 129.7 kg (286 lb) (03/07/24 1402)  Body mass index is 43.49 kg/m².  Body surface area is 2.49 meters squared.    I/O last 3 completed shifts:  In: 142.2 [IV Piggyback:142.2]  Out: 400 [Urine:400]     Physical Exam  Constitutional:       General: She is not in acute distress.     Appearance: She is well-developed. She is obese. She is ill-appearing. She is not toxic-appearing or diaphoretic.   HENT:      Head: Normocephalic and atraumatic.   Cardiovascular:      Rate and Rhythm: Normal rate and regular rhythm.      Heart sounds: Normal heart sounds. No murmur heard.     No friction rub. No gallop.   Pulmonary:      Effort: Pulmonary effort is normal. No respiratory distress.      Breath sounds: Normal breath sounds. No wheezing or rales.   Abdominal:      General: Bowel sounds are normal. There is no distension.       Palpations: Abdomen is soft. There is no mass.      Tenderness: There is no abdominal tenderness. There is no guarding or rebound.   Skin:     General: Skin is warm and dry.   Neurological:      Mental Status: She is alert.          Significant Labs:  All labs within the past 24 hours have been reviewed.    Significant Imaging:  Labs: Reviewed

## 2024-03-08 NOTE — ED NOTES
Assumed care of patient, care handoff from ELISABETH Grullon.  LOC: The patient is awake with an appropriate affect, the patient is nonverbal and unable to follow commands with RN however pt able to look towards RN when name is called.   APPEARANCE: Patient appears comfortable and in no acute distress, patient is clean and well groomed.  SKIN: The skin is warm and dry, color consistent with ethnicity, patient has normal skin turgor and moist mucus membranes, skin breakdown noted to abdomen and buttocks.   MUSCULOSKELETAL: Patient moving all extremities spontaneously, no swelling noted.  RESPIRATORY: Trach in place. Airway is open and patent, respirations are spontaneous, patient has a normal effort and rate, no accessory muscle.  CARDIAC: Pt placed on cardiac monitor. Patient has a normal rate and regular rhythm, no edema noted, capillary refill < 3 seconds.   GASTRO: Soft and non tender to palpation, no distention noted. Rectal tube in place. Pt has PEG tube connected to continuous feedings.  : Pt denies any pain or frequency with urination. Glynn catheter in place.  NEURO: Pt opens eyes spontaneously, behavior appropriate to situation, unable to hand grasp, no purposeful motor response noted, unable normal sensation in all extremities when touched with a finger.

## 2024-03-08 NOTE — ASSESSMENT & PLAN NOTE
Developed ALVARADO most likley ATN in setting of septic shock; requiring RRT during prior admission. Initially needed hemodialysis. Showed signs of renal recovery but now worsening w/ Cr at 3.0 on admission.    - Nephrology consult  - Daily Renal Function Panel   - Avoid Hypotension.  - Renally dose all meds  - Please avoid nephrotoxins, including NSAIDs, aminoglycosides, IV contrast (unless absolutely necessary), gadolinium, fleets and other phosphorous-based laxatives. Caution with antibiotics.

## 2024-03-08 NOTE — PHARMACY MED REC
"Admission Medication History     The home medication history was taken by Gabby Bassett.    You may go to "Admission" then "Reconcile Home Medications" tabs to review and/or act upon these items.     The home medication list has been updated by the Pharmacy department.   Please read ALL comments highlighted in yellow.   Please address this information as you see fit.    Feel free to contact us if you have any questions or require assistance.      Medications listed below were obtained from: SNF/Rehab/LTAC      Medications   amLODIPine tablet 10 mg  Dose: 10 mg  Freq: Daily Route: PER G TUBE  Start: 03/06/24 0900 End: 03/08/24 0948      ascorbic acid (vitamin C) tablet 500 mg  Dose: 500 mg  Freq: 2 times daily Route: PER G TUBE  Start: 03/06/24 2100 End: 03/08/24 0948      carvediloL tablet 25 mg  Dose: 25 mg  Freq: 2 times daily Route: PER G TUBE  Start: 03/05/24 2100 End: 03/08/24 0948      chlorhexidine 0.12 % solution 15 mL  Dose: 15 mL  Freq: 2 times daily Route: MT  Start: 03/05/24 2245 End: 03/08/24 0948      heparin (porcine) injection 7,500 Units  Dose: 7,500 Units  Freq: Every 8 hours Route: SubQ  Start: 03/05/24 2200 End: 03/08/24 0948      insulin aspart U-100 pen 8 Units  Dose: 8 Units  Freq: Every 4 hours Route: SubQ  Indications Comment: Insulin ordered q4 hours for blood sugar control  Start: 03/05/24 2015 End: 03/08/24 0948      insulin detemir U-100 (Levemir) pen 14 Units  Dose: 14 Units  Freq: Daily Route: SubQ  Start: 03/06/24 0900 End: 03/08/24 0948      meropenem (MERREM) 2 g in sodium chloride 0.9% 100 mL IVPB  Dose: 2 g  Freq: Once Route: IV  Indications of Use: Skin & soft tissue  Start: 03/07/24 1430 End: 03/08/24 0948      pantoprazole suspension 40 mg  Dose: 40 mg  Freq: Daily Route: PER G TUBE  Start: 03/07/24 0900 End: 03/08/24 0948      psyllium husk (aspartame) 3.4 gram packet 1 packet  Dose: 1 packet  Freq: Daily Route: PER G TUBE  Start: 03/06/24 0900 End: 03/08/24 0948      luis angel" carbonate pwpk 1.6 g  Dose: 1.6 g  Freq: 3 times daily Route: PER G TUBE  Start: 03/05/24 2100 End: 03/08/24 0948      zinc sulfate capsule 220 mg  Dose: 220 mg  Freq: Daily Route: PER G TUBE  Start: 03/07/24 0900 End: 03/08/24 0948          Potential issues to be addressed PRIOR TO DISCHARGE  The listed medications were obtained from another facility ([facility]). The patient may not have been able to fill these prescriptions prior to this admission and may require new scripts upon discharge.             Gabby Bassett  [id number]                  .

## 2024-03-08 NOTE — HPI
53 y.o. female with hx of recent Ayaz's gangrene s/p surgical debridement, T2DM, AHRF s/p tracheostomy, embolic CVA, presents to the ED from LTAC with low grade fevers. 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 Hospital course was complicated by DKA, ART requiring RRT, acute respiratory failure requiring intubation and subsequent tracheostomy, and was found to have multiple infarcts on MRI brain with minimal spontaneous movements.  Palliative consulted however family wanted to proceed with trach (8.0/85 mm cuffed), PEG placement on 2/22/24.     Patient with perm cath in place since last visit. Had ongoing recovering ALVARADO, without acute needs for RRT. Unfortunately now presenters with fever, leukocytosis, and concerns wound infection. Surgery evaluated patient at bedside and do not feel wound is source of infection at this time.     Patient with adequate UOP; however worsened renal function ~3; BUN ~124; K 5.     Nephrology consulted for ALVARADO and evaluation for RRT needs.

## 2024-03-08 NOTE — CONSULTS
RD consulted for TF RECS    Recommendations    1. TF RECS: Novasource Renal @40ml/hr to provide 1920 kcals, 87g PRO, 688 ml fluid w/ additonal FWF per MD   2. Monitor labs and wt    Goals: Meet % een/epn by next RD f/u  Nutrition Goal Status: new  Communication of RD Recs: other (comment) (poc)    Thanks,    Rahel Low RD, LDN

## 2024-03-08 NOTE — ASSESSMENT & PLAN NOTE
S/P tracheostomy on 2/22. Per chart review, pulm considering trach exchange early next week.     - Continue trach collar as tolerated  - Wean oxygen for goal spO2>90%  - Continue duonebs prn   - routine trach care  - Suction q4hr

## 2024-03-08 NOTE — PT/OT/SLP EVAL
Occupational Therapy   Evaluation    Co-eval with PT to have 2 skilled therapists present to safely assess pt's functional mobility.     Name: Sarah Saravia  MRN: [id number]  Admitting Diagnosis: Sepsis  Recent Surgery: * No surgery found *      Recommendations:     Discharge Recommendations: Moderate Intensity Therapy  Discharge Equipment Recommendations:  wheelchair, hospital bed, lift device  Barriers to discharge:  Other (Comment) (significant assistance required with ADLs and mobility)    Assessment:     Sarah Saravia is a 53 y.o. female with a medical diagnosis of Sepsis.  Pt had very limited engagement during session, following minimal commands.  She requires significant assistance with ADLs and mobility.  Due to her current level of function compared to her PLOF, moderate intensity therapy recommended at d/c for maximal pt gains in functional independence.   She presents with the following.  Performance deficits affecting function: weakness, impaired endurance, impaired self care skills, impaired functional mobility, gait instability, impaired balance, visual deficits, impaired cognition, decreased lower extremity function, decreased coordination, decreased upper extremity function, decreased safety awareness, pain, decreased ROM, impaired fine motor, impaired coordination, abnormal tone, impaired sensation, impaired joint extensibility, impaired cardiopulmonary response to activity, impaired skin.      Rehab Prognosis: Good; patient would benefit from acute skilled OT services to address these deficits and reach maximum level of function.       Plan:     Patient to be seen 3 x/week to address the above listed problems via self-care/home management, therapeutic activities, therapeutic exercises, neuromuscular re-education  Plan of Care Expires: 04/07/24  Plan of Care Reviewed with: patient    Subjective     Chief Complaint: pt non-verbal  Patient/Family Comments/goals: pt non-verbal    Occupational  Profile: (all obtained from chart)  Living Environment: Pt lives with her significant other in a [facility] with 1 JERAD.    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   Equipment Used at Home: none  Assistance upon Discharge: Unknown    Pain/Comfort:  Pain Rating 1:  (pt is non-verbal; facial grimacing with ROM to BUE and BLE)  Location - Side 1: Bilateral  Location - Orientation 1: generalized  Location 1: arm (and BLE)  Pain Addressed 1: Cessation of Activity, Nurse notified  Pain Rating Post-Intervention 1: other (see comments) (no grimacing at rest)  Location 2:  (and BLE)    Patients cultural, spiritual, Yazidism conflicts given the current situation: no    Objective:     Communicated with: nursing and PT prior to session.  Patient found HOB elevated with blood pressure cuff, telemetry, pulse ox (continuous), pressure relief boots, bowel management system, oxygen, Tracheostomy, PEG Tube, peripheral IV, meza catheter upon OT entry to room.    General Precautions: Standard, contact, fall  Orthopedic Precautions: N/A  Braces: N/A  Respiratory Status:  trach collar    Occupational Performance:    Bed Mobility:    Not performed due to pt's poor command following    Functional Mobility/Transfers:  Not performed due to pt's poor command following    Activities of Daily Living:  Dependent for all ADLs at bed level    Cognitive/Visual Perceptual:  Pt is non-verbal.  She's unable to visually track.  Pt was able to squeeze writing therapist's hand with her L-hand on command.  She was also able to raise her L hand twice on command but with 5-10 second delay.      Physical Exam:  Upper Extremity Range of Motion:     -       Right Upper Extremity: Deficits: no AROM; PROM greater than 90 degrees but less than full range and with pain  -       Left Upper Extremity: no AROM; PROM greater than 90 degrees but less than full range and with pain  Upper  Extremity Strength:    -       Right Upper Extremity: 0/5  -       Left Upper Extremity: 0/5   Strength:    -       Right Upper Extremity:  trace hand squeeze  -       Left Upper Extremity:  trace hand squeeze    AMPAC 6 Click ADL:  AMPAC Total Score: 6    Treatment & Education:  Pt edu on role of OT, POC.  No evidence of learning.    Patient left HOB elevated with all lines intact, call button in reach, nurse notified, and nurse present    GOALS:   Multidisciplinary Problems       Occupational Therapy Goals          Problem: Occupational Therapy    Goal Priority Disciplines Outcome Interventions   Occupational Therapy Goal     OT, PT/OT Ongoing, Progressing    Description: Goals to be met by: 3/22/2024     Patient will increase functional independence with ADLs by performing:    Grooming while EOB with Maximum Assistance.  Toileting from bed level with Maximum Assistance for hygiene and clothing management.   Sitting at edge of bed x 5 minutes with Maximum Assistance.  Rolling to Bilateral with Maximum Assistance.   Supine to sit with Maximum Assistance.                         History:     No past medical history on file.      Past Surgical History:   Procedure Laterality Date    CLOSURE OF WOUND Right 2/22/2024    Procedure: CLOSURE, WOUND;  Surgeon: Steve Cole MD;  Location: [address];  Service: General;  Laterality: Right;    ESOPHAGOGASTRODUODENOSCOPY W/ PEG N/A 2/22/2024    Procedure: EGD, WITH PEG TUBE INSERTION;  Surgeon: Steve Cole MD;  Location: [address];  Service: General;  Laterality: N/A;    INCISION AND DRAINAGE OF PERIRECTAL REGION N/A 1/29/2024    Procedure: INCISION AND DRAINAGE, PERIRECTAL REGION;  Surgeon: Axel Ramsay MD;  Location: [facility];  Service: General;  Laterality: N/A;    LUMBAR PUNCTURE N/A 2/16/2024    Procedure: Lumbar Puncture;  Surgeon: Jose Armando Boykin;  Location: [facility];  Service: Anesthesiology;  Laterality: N/A;    PLACEMENT, TRIALYSIS CATH Right  1/31/2024    Procedure: INSERTION, CATHETER, TRIPLE LUMEN, HEMODIALYSIS, TEMPORARY;  Surgeon: Alvin Junior MD;  Location: [facility] OR;  Service: General;  Laterality: Right;    REPLACEMENT OF WOUND VACUUM-ASSISTED CLOSURE DEVICE Right 2/12/2024    Procedure: REPLACEMENT, WOUND VAC;  Surgeon: Mundo Carmona MD;  Location: [facility] OR 2ND FLR;  Service: General;  Laterality: Right;    REPLACEMENT OF WOUND VACUUM-ASSISTED CLOSURE DEVICE N/A 2/15/2024    Procedure: REPLACEMENT, WOUND VAC;  Surgeon: Steve Cole MD;  Location: [facility] OR 2ND FLR;  Service: General;  Laterality: N/A;    REPLACEMENT OF WOUND VACUUM-ASSISTED CLOSURE DEVICE Right 2/19/2024    Procedure: REPLACEMENT, WOUND VAC;  Surgeon: Steve Cole MD;  [facility];  Service: General;  Laterality: Right;  RLE/groin    REPLACEMENT OF WOUND VACUUM-ASSISTED CLOSURE DEVICE Right 2/22/2024    Procedure: REPLACEMENT, WOUND VAC;  Surgeon: Steve Cole MD;  [facility];  Service: General;  Laterality: Right;    TRACHEOSTOMY N/A 2/22/2024    Procedure: CREATION, TRACHEOSTOMY;  Surgeon: Steve Cole MD;  [facility];  Service: General;  Laterality: N/A;    WOUND DEBRIDEMENT Bilateral 2/2/2024    Procedure: DEBRIDEMENT, WOUND;  Surgeon: Steve Cole MD;  [facility];  Service: General;  Laterality: Bilateral;  Bilateral groin  Possible wound vac placement    WOUND DEBRIDEMENT Right 2/6/2024    Procedure: DEBRIDEMENT, WOUND, replace wound vac, possible closure;  Surgeon: Steve Cole MD;  [facility];  Service: General;  Laterality: Right;  RLE    WOUND DEBRIDEMENT Right 2/9/2024    Procedure: DEBRIDEMENT, WOUND w wound vac change;  Surgeon: Steve Cole MD;  [facility];  Service: General;  Laterality: Right;  RLE    WOUND DEBRIDEMENT Right 2/12/2024    Procedure: R thigh wound debridement;  Surgeon: Mundo Carmona MD;  [facility];  Service: General;   Laterality: Right;    WOUND EXPLORATION Right 1/31/2024    Procedure: IRRIGATION & DEBRIDEMENT, WOUND DEBRIDEMENT;  Surgeon: Alvin Junior MD;  Location: [facility];  Service: General;  Laterality: Right;       Time Tracking:     OT Date of Treatment: 03/08/24  OT Start Time: 0916  OT Stop Time: 0931  OT Total Time (min): 15 min    Billable Minutes:Evaluation 15 min    3/8/2024

## 2024-03-08 NOTE — PROGRESS NOTES
[facility] - Emergency Dept  General Surgery  Progress Note    Subjective:     History of Present Illness:  No notes on file    Post-Op Info:  * No surgery found *         Interval History: CT imaging obtained, no significant fluid collection present.  Opened wound some at bedside.  Tissue is healthy and viable without obvious sign of infectious process.  Air present is the pinrose and open wound.    Medications:  Continuous Infusions:  Scheduled Meds:   amLODIPine  10 mg Per G Tube Daily    ascorbic acid (vitamin C)  500 mg Per G Tube BID    carvediloL  25 mg Per G Tube BID    chlorhexidine  15 mL Mouth/Throat BID    DAPTOmycin (CUBICIN) IV (PEDS and ADULTS)  8 mg/kg Intravenous Q24H    heparin (porcine)  7,500 Units Subcutaneous Q8H    insulin aspart U-100  8 Units Subcutaneous Q4H    insulin detemir U-100  14 Units Subcutaneous Daily    meropenem (MERREM) IVPB  2 g Intravenous Q12H    pantoprazole  40 mg Per G Tube Daily    psyllium husk (aspartame)  1 packet Per G Tube Daily    sevelamer carbonate  1.6 g Per G Tube TID    zinc sulfate  220 mg Per G Tube Daily     PRN Meds:acetaminophen, albuterol-ipratropium, dextrose 10%, dextrose 10%, glucagon (human recombinant), glucose, glucose, hydrALAZINE, insulin aspart U-100, labetalol, naloxone, ondansetron, prochlorperazine, sodium chloride 0.9%     Review of patient's allergies indicates:  No Known Allergies  Objective:     Vital Signs (Most Recent):  Temp: 98.8 °F (37.1 °C) (03/08/24 0502)  Pulse: 97 (03/08/24 0700)  Resp: 18 (03/08/24 0700)  BP: (!) 161/72 (03/08/24 0700)  SpO2: 99 % (03/08/24 0700) Vital Signs (24h Range):  Temp:  [98.2 °F (36.8 °C)-100.3 °F (37.9 °C)] 98.8 °F (37.1 °C)  Pulse:  [] 97  Resp:  [15-33] 18  SpO2:  [92 %-100 %] 99 %  BP: (125-180)/(61-82) 161/72     Weight: 129.7 kg (286 lb)  Body mass index is 43.49 kg/m².    Intake/Output - Last 3 Shifts         03/06 0700 03/07 0659 03/07 0700 03/08 0659 03/08 0700 03/09 0659    NG/  500     IV Piggyback  142.2     Total Intake(mL/kg) 720 (5.5) 642.2 (5)     Urine (mL/kg/hr) 1400 (0.4) 400 (0.1)     Other 0      Stool 0      Total Output 1400 400     Net -680 +242.2            Stool Occurrence 1 x               Physical Exam  Constitutional:       Comments: Not following commands or responding   HENT:      Head: Normocephalic.   Cardiovascular:      Rate and Rhythm: Normal rate and regular rhythm.   Pulmonary:      Effort: Pulmonary effort is normal. No respiratory distress.   Abdominal:      General: There is no distension.   Musculoskeletal:      Comments: Occassionally moves upper extremities nonpurposefully   Skin:     General: Skin is warm and dry.      Comments: Posterior wound with drainage   Neurological:      Comments: Unresponsive, unchanged          Significant Labs:  I have reviewed all pertinent lab results within the past 24 hours.  CBC:   Recent Labs   Lab 03/08/24  0403   WBC 14.11*   RBC 3.13*   HGB 8.8*   HCT 28.9*   *   MCV 92   MCH 28.1   MCHC 30.4*     CMP:   Recent Labs   Lab 03/07/24  1600 03/08/24  0403   * 211*   CALCIUM 10.6* 10.2   ALBUMIN 2.9* 2.8*   PROT 9.2* 8.9*    146*   K 5.1 5.0   CO2 20* 21*    111*   *  --    CREATININE 2.6* 3.0*   ALKPHOS 187* 162*   ALT 36 35   AST 36 29   BILITOT 0.2 0.3       Significant Diagnostics:  I have reviewed all pertinent imaging results/findings within the past 24 hours.  Assessment/Plan:     * Sepsis  Ms. Saravia is a 52yo female well known to the surgical service.  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  After a prolonged hospital course she was discharged to LTAC on 3/7 however is now representing on 3/8 with fever, pyuria, and drainage from her posterior wound.     Recommendations:  - Admitted to hospital medicine  - Wound opened some at bedside  and tissue is beefy red and healthy appearing.  Will keep pinrose in place for continued drainage.  Her wound is unlikely be the source of her infection.  - Would get wound care involved for assistance with wound management  - Would do a full infectious workup, urine cultures, blood cultures, wound culture  - Infectious disease consult given her history of infections and to help with tailoring of antibiotics  - Rest of care per primary team        Enzo Bagley MD  General Surgery  [facility] - Emergency Dept

## 2024-03-08 NOTE — ED NOTES
I assumed care of this patient at this time. Report received from ELISABETH Thompson. Pt is resting comfortably in ED stretcher. Pt's oxygen saturation is 100% on 5 LPM via tracheostomy at this time. Pt remains on continuous cardiac monitor, pulse ox, and BP cuff to cycle. Bed low and locked; side rails up x2; call light within reach.

## 2024-03-08 NOTE — ED NOTES
Nurses Note -- 4 Eyes      3/8/2024   2:45 AM      Skin assessed during: Admit      [] No Altered Skin Integrity Present    []Prevention Measures Documented      [x] Yes- Altered Skin Integrity Present or Discovered   [x] LDA Added if Not in Epic (Describe Wound)   [x] New Altered Skin Integrity was Present on Admit and Documented in LDA   [x] Wound Image Taken    Wound Care Consulted? No    Attending Nurse:  Jay Eastman RN/Staff Member:  Chris

## 2024-03-08 NOTE — SUBJECTIVE & OBJECTIVE
No past medical history on file.    Past Surgical History:   Procedure Laterality Date    CLOSURE OF WOUND Right 2/22/2024    Procedure: CLOSURE, WOUND;  Surgeon: Steve Cole MD;  [facility];  Service: General;  Laterality: Right;    ESOPHAGOGASTRODUODENOSCOPY W/ PEG N/A 2/22/2024    Procedure: EGD, WITH PEG TUBE INSERTION;  Surgeon: Steve Cole MD;  Location: [facility] OR 2ND FLR;  Service: General;  Laterality: N/A;    INCISION AND DRAINAGE OF PERIRECTAL REGION N/A 1/29/2024    Procedure: INCISION AND DRAINAGE, PERIRECTAL REGION;  Surgeon: Axel Ramsay MD;  Location: [facility] OR;  Service: General;  Laterality: N/A;    LUMBAR PUNCTURE N/A 2/16/2024    Procedure: Lumbar Puncture;  Surgeon: Macy Boykin;  Location: [facility];  Service: Anesthesiology;  Laterality: N/A;    PLACEMENT, TRIALYSIS CATH Right 1/31/2024    Procedure: INSERTION, CATHETER, TRIPLE LUMEN, HEMODIALYSIS, TEMPORARY;  Surgeon: Alvin Junior MD;  Location: [facility] OR;  Service: General;  Laterality: Right;    REPLACEMENT OF WOUND VACUUM-ASSISTED CLOSURE DEVICE Right 2/12/2024    Procedure: REPLACEMENT, WOUND VAC;  Surgeon: Mundo Carmona MD;  [facility]: [facility];  Service: General;  Laterality: Right;    REPLACEMENT OF WOUND VACUUM-ASSISTED CLOSURE DEVICE N/A 2/15/2024    Procedure: REPLACEMENT, WOUND VAC;  Surgeon: Steve Cole MD;  [facility]: [facility];  Service: General;  Laterality: N/A;    REPLACEMENT OF WOUND VACUUM-ASSISTED CLOSURE DEVICE Right 2/19/2024    Procedure: REPLACEMENT, WOUND VAC;  Surgeon: Steve Cole MD;  Location: [facility] OR [facility] FLR;  Service: General;  Laterality: Right;  RLE/groin    REPLACEMENT OF WOUND VACUUM-ASSISTED CLOSURE DEVICE Right 2/22/2024    Procedure: REPLACEMENT, WOUND VAC;  Surgeon: Steve Cole MD;  Location: [facility] OR 2ND FLR;  Service: General;  Laterality: Right;    TRACHEOSTOMY N/A 2/22/2024    Procedure: CREATION, TRACHEOSTOMY;  Surgeon: Steve Cole MD;  Location:  [facility] OR 2ND FLR;  Service: General;  Laterality: N/A;    WOUND DEBRIDEMENT Bilateral 2/2/2024    Procedure: DEBRIDEMENT, WOUND;  Surgeon: Steve Cole MD;  [facility];  Service: General;  Laterality: Bilateral;  Bilateral groin  Possible wound vac placement    WOUND DEBRIDEMENT Right 2/6/2024    Procedure: DEBRIDEMENT, WOUND, replace wound vac, possible closure;  Surgeon: Steve Cole MD;  [facility];  Service: General;  Laterality: Right;  RLE    WOUND DEBRIDEMENT Right 2/9/2024    Procedure: DEBRIDEMENT, WOUND w wound vac change;  Surgeon: Steve Cole MD;  [facility];  Service: General;  Laterality: Right;  RLE    WOUND DEBRIDEMENT Right 2/12/2024    Procedure: R thigh wound debridement;  Surgeon: Mundo Carmona MD;  Location: [facility] OR [address] [place] [address];  Service: General;  Laterality: Right;    WOUND EXPLORATION Right 1/31/2024    Procedure: IRRIGATION & DEBRIDEMENT, WOUND DEBRIDEMENT;  Surgeon: Alvin Junior MD;  Location: [facility];  Service: General;  Laterality: Right;       Review of patient's allergies indicates:  No Known Allergies    Current Facility-Administered Medications on File Prior to Encounter   Medication    [MAR Hold - Suspended Admission] acetaminophen tablet 650 mg    [MAR Hold - Suspended Admission] albuterol-ipratropium 2.5 mg-0.5 mg/3 mL nebulizer solution 3 mL    [MAR Hold - Suspended Admission] amLODIPine tablet 10 mg    [MAR Hold - Suspended Admission] ascorbic acid (vitamin C) tablet 500 mg    [MAR Hold - Suspended Admission] carvediloL tablet 25 mg    [MAR Hold - Suspended Admission] chlorhexidine 0.12 % solution 15 mL    [MAR Hold - Suspended Admission] DAPTOmycin (CUBICIN) 1,100 mg in sodium chloride 0.9% SolP 50 mL IVPB    [MAR Hold - Suspended Admission] dextrose 10% bolus 125 mL 125 mL    [MAR Hold - Suspended Admission] dextrose 10% bolus 250 mL 250 mL    [MAR Hold - Suspended Admission] glucagon (human recombinant) injection 1 mg     [MAR Hold - Suspended Admission] glucose chewable tablet 16 g    [MAR Hold - Suspended Admission] glucose chewable tablet 24 g    [MAR Hold - Suspended Admission] heparin (porcine) injection 7,500 Units    [MAR Hold - Suspended Admission] hydrALAZINE tablet 25 mg    [MAR Hold - Suspended Admission] insulin aspart U-100 pen 0-10 Units    [MAR Hold - Suspended Admission] insulin aspart U-100 pen 8 Units    [MAR Hold - Suspended Admission] insulin detemir U-100 (Levemir) pen 14 Units    [MAR Hold - Suspended Admission] labetaloL injection 10 mg    [MAR Hold - Suspended Admission] meropenem (MERREM) 2 g in sodium chloride 0.9% 100 mL IVPB    [MAR Hold - Suspended Admission] naloxone 0.4 mg/mL injection 0.02 mg    [MAR Hold - Suspended Admission] ondansetron injection 4 mg    [MAR Hold - Suspended Admission] pantoprazole suspension 40 mg    [MAR Hold - Suspended Admission] prochlorperazine injection Soln 5 mg    [MAR Hold - Suspended Admission] psyllium husk (aspartame) 3.4 gram packet 1 packet    [MAR Hold - Suspended Admission] sevelamer carbonate pwpk 1.6 g    [MAR Hold - Suspended Admission] sodium chloride 0.9% bolus 250 mL 250 mL    [MAR Hold - Suspended Admission] sodium chloride 0.9% flush 10 mL    [MAR Hold - Suspended Admission] zinc sulfate capsule 220 mg    [DISCONTINUED] DAPTOmycin (CUBICIN) 1,040 mg in sodium chloride 0.9% SolP 50 mL IVPB     No current outpatient medications on file prior to encounter.     Family History    None       Tobacco Use    Smoking status: Not on file    Smokeless tobacco: Not on file   Substance and Sexual Activity    Alcohol use: Not on file    Drug use: Not on file    Sexual activity: Not on file     Review of Systems   Unable to perform ROS: Acuity of condition   All other systems reviewed and are negative.    Objective:     Vital Signs (Most Recent):  Temp: 98.8 °F (37.1 °C) (03/08/24 0502)  Pulse: 95 (03/08/24 0529)  Resp: (!) 26 (03/08/24 0529)  BP: (!) 142/65 (03/08/24  0502)  SpO2: 96 % (03/08/24 0529) Vital Signs (24h Range):  Temp:  [98.2 °F (36.8 °C)-100.3 °F (37.9 °C)] 98.8 °F (37.1 °C)  Pulse:  [] 95  Resp:  [15-33] 26  SpO2:  [92 %-100 %] 96 %  BP: (125-180)/(61-82) 142/65     Weight: 129.7 kg (286 lb)  Body mass index is 43.49 kg/m².     Physical Exam  Vitals and nursing note reviewed.   Constitutional:       Appearance: She is obese. She is toxic-appearing.      Comments: Unable to respond or follow commands   HENT:      Head: Atraumatic.   Neck:      Comments: Tracheostomy in place    Cardiovascular:      Rate and Rhythm: Normal rate and regular rhythm.      Pulses: Normal pulses.      Heart sounds: Normal heart sounds.   Pulmonary:      Effort: Pulmonary effort is normal.      Breath sounds: Normal breath sounds.   Abdominal:      General: Abdomen is flat.      Palpations: Abdomen is soft.   Musculoskeletal:         General: Normal range of motion.   Neurological:      General: No focal deficit present.      Mental Status: She is oriented to person, place, and time.   Psychiatric:         Mood and Affect: Mood normal.         Behavior: Behavior normal.                Significant Labs: All pertinent labs within the past 24 hours have been reviewed.    Significant Imaging: I have reviewed all pertinent imaging results/findings within the past 24 hours.

## 2024-03-08 NOTE — ASSESSMENT & PLAN NOTE
Ms. Saravia is a 52yo female well known to the surgical service.  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  After a prolonged hospital course she was discharged to LTAC on 3/7 however is now representing on 3/8 with fever, pyuria, and drainage from her posterior wound.     Recommendations:  - Admitted to hospital medicine  - Will open wound at bedside and pack with wet to dry dressing and evaluate  - Would do a full infectious workup, urine cultures, blood cultures, wound culture  - Infectious disease consult given her history of infections and to help with tailoring of antibiotics  - Rest of care per primary team

## 2024-03-08 NOTE — PT/OT/SLP EVAL
Physical Therapy Co-Evaluation    Patient Name:  Sarah Saravia   MRN:  [id number]    *co-treatment with OT  2/2 pt with potential impaired ability to tolerate 2 evaluations 2/2 medical status   Recommendations:     Discharge Recommendations: Moderate Intensity Therapy   Discharge Equipment Recommendations: lift device, hospital bed, wheelchair   Barriers to discharge: Decreased caregiver support at current level of function     Assessment:     Sarah Saravia is a 53 y.o. female admitted with a medical diagnosis of Sepsis.  She presents with the following impairments/functional limitations: weakness, impaired endurance, impaired sensation, impaired self care skills, impaired functional mobility, gait instability, impaired balance, impaired cognition, decreased coordination, decreased upper extremity function, decreased lower extremity function, pain, abnormal tone, decreased ROM, impaired coordination, impaired fine motor, impaired skin, impaired cardiopulmonary response to activity, impaired joint extensibility. Pt admitted from LTAC s/p complex medical diagnoses including multiple brain infarcts. Pt able to follow one very simple command with delayed response time, some spontaneous movement of  L UE and will turn head occasionally, but otherwise no other active movement observed. Patient currently demonstrates a need for moderate intensity therapy on a daily basis post acute secondary to a decline in functional status due to illness . Pt would continue to benefit from acute skilled therapy intervention to address deficits and progress toward prior level of function.       Rehab Prognosis: Good; patient would benefit from acute skilled PT services to address these deficits and reach maximum level of function.    Recent Surgery: * No surgery found *      Plan:     During this hospitalization, patient to be seen 3 x/week to address the identified rehab impairments via gait training, therapeutic activities,  therapeutic exercises, neuromuscular re-education, wheelchair management/training and progress toward the following goals:    Plan of Care Expires:  04/08/24    Subjective     Chief Complaint: unable to assess   Patient/Family Comments/goals: unable to assess   Pain/Comfort:  Pain Rating 1:  (unable to assess, facial grimacing observed with ROM to B UE and LE)  Pain Addressed 1: Cessation of Activity  Pain Rating Post-Intervention 1:  (improved facial expression)    Patients cultural, spiritual, Caodaism conflicts given the current situation: no    Living Environment:  Per chart review from LTAC, pt lives with significant other in a [facility] with threshold JERAD.   Prior to admission, patients level of function was independent with mobility and ADLs, worked as a sitting. Upon LTAC eval 3/6/2024 pt requiring total assistance for all mobility, unable to follow commands. Equipment used at home: none.  DME owned (not currently used): none.  Upon discharge, patient will have assistance from unknown.    Objective:     Communicated with RN prior to session.  Patient found HOB elevated with blood pressure cuff, bowel management system, telemetry, pulse ox (continuous), Tracheostomy, oxygen, PEG Tube, pressure relief boots  upon PT entry to room.    General Precautions: Standard, contact, fall  Orthopedic Precautions:N/A   Braces: N/A  Respiratory Status:  trach collar    Exams:  Cognitive Exam:  Patient is awake and alert, attention mostly to the L, occasional visual attention, does not visually track, will intermittently look to the R but not on command, able to follow simple command to squeeze hand and lift UE with significant delay on L UE only, trace hand squeeze in R hand   RLE ROM: hip/knee flexion limited ~70 degrees 2/2 pt grimace with muscle/joint restriction observed   RLE Strength: no active movement   LLE ROM: hip/knee flexion limited ~70 degrees 2/2 pt grimace with muscle/joint restriction observed   LLE Strength:  no active movement     Functional Mobility:  Dependent assistance provided to adjust trunk position with HOB elevated, unable to lower HOB to flat to reposition fully.       AM-PAC 6 CLICK MOBILITY  Total Score:6       Treatment & Education:  [place] rolls provided under UE for optimal positioning, B LE adjusted for neutral joint positions.       Patient left HOB elevated with all lines intact and RN present.    GOALS:   Multidisciplinary Problems       Physical Therapy Goals          Problem: Physical Therapy    Goal Priority Disciplines Outcome Goal Variances Interventions   Physical Therapy Goal     PT, PT/OT Ongoing, Progressing     Description: Goals to be met by: 3/22/2024     Patient will increase functional independence with mobility by performin. Pt will tolerate full ROM x10 to B UE and LE with no withdraw or signs of significant discomfort   2. Pt will follow 3 different 1 step verbal or demonstrated commands   3. Pt will tolerate sitting position for 5 mins with no significant signs of agitation with VSS   4. Pt will tolerate rolling L and R with total assistance with VSS                          History:     No past medical history on file.    Past Surgical History:   Procedure Laterality Date    CLOSURE OF WOUND Right 2024    Procedure: CLOSURE, WOUND;  Surgeon: Steve Cole MD;  Location: [address];  Service: General;  Laterality: Right;    ESOPHAGOGASTRODUODENOSCOPY W/ PEG N/A 2024    Procedure: EGD, WITH PEG TUBE INSERTION;  Surgeon: Steve Cole MD;  Location: [address];  Service: General;  Laterality: N/A;    INCISION AND DRAINAGE OF PERIRECTAL REGION N/A 2024    Procedure: INCISION AND DRAINAGE, PERIRECTAL REGION;  Surgeon: Axel Ramsay MD;  Location: [facility];  Service: General;  Laterality: N/A;    LUMBAR PUNCTURE N/A 2024    Procedure: Lumbar Puncture;  Surgeon: Jose Armando Boykin;  Location: [facility];  Service: Anesthesiology;  Laterality: N/A;     PLACEMENT, TRIALYSIS CATH Right 1/31/2024    Procedure: INSERTION, CATHETER, TRIPLE LUMEN, HEMODIALYSIS, TEMPORARY;  Surgeon: Alvin Junior MD;  Location: [facility] OR;  Service: General;  Laterality: Right;    REPLACEMENT OF WOUND VACUUM-ASSISTED CLOSURE DEVICE Right 2/12/2024    Procedure: REPLACEMENT, WOUND VAC;  Surgeon: uMndo Carmona MD;  Location: [facility] OR [facility];  Service: General;  Laterality: Right;    REPLACEMENT OF WOUND VACUUM-ASSISTED CLOSURE DEVICE N/A 2/15/2024    Procedure: REPLACEMENT, WOUND VAC;  Surgeon: Steve Cole MD;  Location: [facility] OR [facility] FLR;  Service: General;  Laterality: N/A;    REPLACEMENT OF WOUND VACUUM-ASSISTED CLOSURE DEVICE Right 2/19/2024    Procedure: REPLACEMENT, WOUND VAC;  Surgeon: Steve Cole MD;  [facility];  Service: General;  Laterality: Right;  RLE/groin    REPLACEMENT OF WOUND VACUUM-ASSISTED CLOSURE DEVICE Right 2/22/2024    Procedure: REPLACEMENT, WOUND VAC;  Surgeon: Steve Cole MD;  [facility];  Service: General;  Laterality: Right;    TRACHEOSTOMY N/A 2/22/2024    Procedure: CREATION, TRACHEOSTOMY;  Surgeon: Steve Cole MD;  [facility];  Service: General;  Laterality: N/A;    WOUND DEBRIDEMENT Bilateral 2/2/2024    Procedure: DEBRIDEMENT, WOUND;  Surgeon: Steve Cole MD;  [facility];  Service: General;  Laterality: Bilateral;  Bilateral groin  Possible wound vac placement    WOUND DEBRIDEMENT Right 2/6/2024    Procedure: DEBRIDEMENT, WOUND, replace wound vac, possible closure;  Surgeon: Steve Cole MD;  [facility];  Service: General;  Laterality: Right;  RLE    WOUND DEBRIDEMENT Right 2/9/2024    Procedure: DEBRIDEMENT, WOUND w wound vac change;  Surgeon: Steve Cole MD;  [facility];  Service: General;  Laterality: Right;  RLE    WOUND DEBRIDEMENT Right 2/12/2024    Procedure: R thigh wound debridement;  Surgeon: Mundo Carmona MD;  Location: [facility]  2ND FLR;  Service: General;  Laterality: Right;    WOUND EXPLORATION Right 1/31/2024    Procedure: IRRIGATION & DEBRIDEMENT, WOUND DEBRIDEMENT;  Surgeon: Alvin Junior MD;  Location: [facility];  Service: General;  Laterality: Right;       Time Tracking:     PT Received On: 03/08/24  PT Start Time: 0916     PT Stop Time: 0930  PT Total Time (min): 14 min     Billable Minutes: Evaluation 14 mins       03/08/2024

## 2024-03-08 NOTE — CONSULTS
[facility] - Emergency Dept  Nephrology  Consult Note    Patient Name: Sarah Saravia  MRN: [id number]  Admission Date: 3/7/2024  Hospital Length of Stay: 0 days  Attending Provider: Janett Jean MD   Primary Care Physician: Patricia [facility]  Principal Problem:Sepsis    Inpatient consult to Nephrology  Consult performed by: Antione Hazel DO  Consult ordered by: Phill Mar DO        Subjective:     HPI:  53 y.o. female with hx of recent Ayaz's gangrene s/p surgical debridement, T2DM, AHRF s/p tracheostomy, embolic CVA, presents to the ED from LTAC with low grade fevers. 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 Hospital course was complicated by DKA, ART requiring RRT, acute respiratory failure requiring intubation and subsequent tracheostomy, and was found to have multiple infarcts on MRI brain with minimal spontaneous movements.  Palliative consulted however family wanted to proceed with trach (8.0/85 mm cuffed), PEG placement on 2/22/24.     Patient with perm cath in place since last visit. Had ongoing recovering ALVARADO, without acute needs for RRT. Unfortunately now presenters with fever, leukocytosis, and concerns wound infection. Surgery evaluated patient at bedside and do not feel wound is source of infection at this time.     Patient with adequate UOP; however worsened renal function ~3; BUN ~124; K 5.     Nephrology consulted for ALVARADO and evaluation for RRT needs.     No past medical history on file.    Past Surgical History:   Procedure Laterality Date    CLOSURE OF WOUND Right 2/22/2024    Procedure: CLOSURE, WOUND;  Surgeon: Steve Cole MD;  Location: [facility] OR [address] [place] [address];  Service: General;  Laterality: Right;    ESOPHAGOGASTRODUODENOSCOPY W/ PEG N/A 2/22/2024    Procedure: EGD, WITH PEG TUBE INSERTION;  Surgeon: Steve Cole MD;  Location: [facility] OR [address] [place] [address];  Service:  General;  Laterality: N/A;    INCISION AND DRAINAGE OF PERIRECTAL REGION N/A 1/29/2024    Procedure: INCISION AND DRAINAGE, PERIRECTAL REGION;  Surgeon: Axel Ramsay MD;  Location: [facility] OR;  Service: General;  Laterality: N/A;    LUMBAR PUNCTURE N/A 2/16/2024    Procedure: Lumbar Puncture;  Surgeon: Macy Boykin;  Location: [facility];  Service: Anesthesiology;  Laterality: N/A;    PLACEMENT, TRIALYSIS CATH Right 1/31/2024    Procedure: INSERTION, CATHETER, TRIPLE LUMEN, HEMODIALYSIS, TEMPORARY;  Surgeon: Alvin Junior MD;  Location: [facility] OR;  Service: General;  Laterality: Right;    REPLACEMENT OF WOUND VACUUM-ASSISTED CLOSURE DEVICE Right 2/12/2024    Procedure: REPLACEMENT, WOUND VAC;  Surgeon: Mundo Carmona MD;  [facility];  Service: General;  Laterality: Right;    REPLACEMENT OF WOUND VACUUM-ASSISTED CLOSURE DEVICE N/A 2/15/2024    Procedure: REPLACEMENT, WOUND VAC;  Surgeon: Steve Cole MD;  Location: [facility] OR 2ND FLR;  Service: General;  Laterality: N/A;    REPLACEMENT OF WOUND VACUUM-ASSISTED CLOSURE DEVICE Right 2/19/2024    Procedure: REPLACEMENT, WOUND VAC;  Surgeon: Steve Cole MD;  [facility];  Service: General;  Laterality: Right;  RLE/groin    REPLACEMENT OF WOUND VACUUM-ASSISTED CLOSURE DEVICE Right 2/22/2024    Procedure: REPLACEMENT, WOUND VAC;  Surgeon: Steve Cole MD;  Location: [facility] OR [facility];  Service: General;  Laterality: Right;    TRACHEOSTOMY N/A 2/22/2024    Procedure: CREATION, TRACHEOSTOMY;  Surgeon: Steve Cole MD;  Location: [facility] OR [facility];  Service: General;  Laterality: N/A;    WOUND DEBRIDEMENT Bilateral 2/2/2024    Procedure: DEBRIDEMENT, WOUND;  Surgeon: Steve Cole MD;  Location: [facility] OR [facility];  Service: General;  Laterality: Bilateral;  Bilateral groin  Possible wound vac placement    WOUND DEBRIDEMENT Right 2/6/2024    Procedure: DEBRIDEMENT, WOUND, replace wound vac, possible closure;  Surgeon: Steve Cole MD;   Location: [facility] OR 2ND FLR;  Service: General;  Laterality: Right;  RLE    WOUND DEBRIDEMENT Right 2/9/2024    Procedure: DEBRIDEMENT, WOUND w wound vac change;  Surgeon: Steve Cole MD;  Location: [facility] OR 2ND FLR;  Service: General;  Laterality: Right;  RLE    WOUND DEBRIDEMENT Right 2/12/2024    Procedure: R thigh wound debridement;  Surgeon: Mundo Carmona MD;  Location: [facility] OR 2ND FLR;  Service: General;  Laterality: Right;    WOUND EXPLORATION Right 1/31/2024    Procedure: IRRIGATION & DEBRIDEMENT, WOUND DEBRIDEMENT;  Surgeon: Alvin Junior MD;  Location: [facility] OR;  Service: General;  Laterality: Right;       Review of patient's allergies indicates:  No Known Allergies  Current Facility-Administered Medications   Medication Frequency    acetaminophen oral solution 650 mg Q4H PRN    albuterol-ipratropium 2.5 mg-0.5 mg/3 mL nebulizer solution 3 mL Q4H PRN    amLODIPine tablet 10 mg Daily    ascorbic acid (vitamin C) tablet 500 mg BID    carvediloL tablet 25 mg BID    chlorhexidine 0.12 % solution 15 mL BID    DAPTOmycin (CUBICIN) 1,040 mg in sodium chloride 0.9% SolP 50 mL IVPB Q24H    dextrose 10% bolus 125 mL 125 mL PRN    dextrose 10% bolus 250 mL 250 mL PRN    glucagon (human recombinant) injection 1 mg PRN    heparin (porcine) injection 7,500 Units Q8H    hydrALAZINE tablet 25 mg Q8H PRN    insulin aspart U-100 pen 0-10 Units Q4H PRN    insulin aspart U-100 pen 8 Units Q4H    insulin detemir U-100 (Levemir) pen 14 Units Daily    labetalol 20 mg/4 mL (5 mg/mL) IV syring Q6H PRN    meropenem (MERREM) 2 g in sodium chloride 0.9% 100 mL IVPB Q12H    mupirocin 2 % ointment BID    naloxone 0.4 mg/mL injection 0.02 mg PRN    ondansetron injection 4 mg Q6H PRN    pantoprazole suspension 40 mg Daily    prochlorperazine injection Soln 5 mg Q6H PRN    psyllium husk (aspartame) 3.4 gram packet 1 packet Daily    sevelamer carbonate pwpk 1.6 g TID    sodium chloride 0.9% flush 10 mL Q12H PRN    zinc sulfate  capsule 220 mg Daily     No current outpatient medications on file.     Family History    None       Tobacco Use    Smoking status: Unknown    Smokeless tobacco: Not on file   Substance and Sexual Activity    Alcohol use: Not on file    Drug use: Not on file    Sexual activity: Not on file     Review of Systems  Objective:     Vital Signs (Most Recent):  Temp: 98.8 °F (37.1 °C) (03/08/24 0502)  Pulse: 98 (03/08/24 1303)  Resp: 18 (03/08/24 0932)  BP: (!) 152/72 (03/08/24 1303)  SpO2: 98 % (03/08/24 1033) Vital Signs (24h Range):  Temp:  [98.2 °F (36.8 °C)-100.3 °F (37.9 °C)] 98.8 °F (37.1 °C)  Pulse:  [] 98  Resp:  [18-33] 18  SpO2:  [92 %-100 %] 98 %  BP: (125-180)/(61-82) 152/72     Weight: 129.7 kg (286 lb) (03/07/24 1402)  Body mass index is 43.49 kg/m².  Body surface area is 2.49 meters squared.    I/O last 3 completed shifts:  In: 142.2 [IV Piggyback:142.2]  Out: 400 [Urine:400]     Physical Exam  Constitutional:       General: She is not in acute distress.     Appearance: She is well-developed. She is obese. She is ill-appearing. She is not toxic-appearing or diaphoretic.   HENT:      Head: Normocephalic and atraumatic.   Cardiovascular:      Rate and Rhythm: Normal rate and regular rhythm.      Heart sounds: Normal heart sounds. No murmur heard.     No friction rub. No gallop.   Pulmonary:      Effort: Pulmonary effort is normal. No respiratory distress.      Breath sounds: Normal breath sounds. No wheezing or rales.   Abdominal:      General: Bowel sounds are normal. There is no distension.      Palpations: Abdomen is soft. There is no mass.      Tenderness: There is no abdominal tenderness. There is no guarding or rebound.   Skin:     General: Skin is warm and dry.   Neurological:      Mental Status: She is alert.          Significant Labs:  All labs within the past 24 hours have been reviewed.    Significant Imaging:  Labs: Reviewed  Assessment/Plan:     Renal/  ALVARADO (acute kidney  injury)  Nonoliguric ALVARADO on baseline normal renal functin  - etiology for ALVARADO 2/2 to ischemic ATN in setting of septic shock.   - was in recovery process; UOP adequate; BUN 2/2 to azotemia from elevated protein content.   - perm cath placed on 02/29/24; has not had iHD since.   - now presented with ALVARADO ~3 from ~2.4 yesterday suspect 2/2 to hypotension/prerenal insult.   Plan:  - Will plan for iHD today; 3hrs low flows for metabolic clearance only  - nephro tube feeds; no need for high protein at this time.   - no need for scheduled diuretics; okay to use PRN for volume overload  - defer to ID for line holiday; if patient continues to show signs of renal recovery would recommend removal of perm cath  - strict ins and outs  - renally dose all med  - avoid nephrotoxins.     Ayaz's gangrene  Per surgery teams.     ID  * Sepsis  Per ID and primary teams.     Endocrine  Severe malnutrition  Nutrition consulted. Most recent weight and BMI monitored-     Measurements:  Wt Readings from Last 1 Encounters:   03/07/24 129.7 kg (286 lb)   Body mass index is 43.49 kg/m².    Patient has been screened and assessed by RD.    Malnutrition Type:  Context:    Level:      Malnutrition Characteristic Summary:       Interventions/Recommendations (treatment strategy):             Thank you for your consult. I will follow-up with patient. Please contact us if you have any additional questions.    Case discussed with attending. Attestation to follow.       Antione Hazel DO  Nephrology  [facility] Dept

## 2024-03-08 NOTE — HPI
53 y.o. female with hx of recent Ayaz's gangrene s/p surgical debridement, T2DM, AHRF s/p tracheostomy, embolic CVA, presents to the ED from LTAC with low grade fevers. 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 Hospital course was complicated by DKA, ART requiring RRT, acute respiratory failure requiring intubation and subsequent tracheostomy, and was found to have multiple infarcts on MRI brain with minimal spontaneous movements.  Palliative consulted however family wanted to proceed with trach (8.0/85 mm cuffed), PEG placement on 2/22/24. She was also evaluated by Nephrology for inability to fully concentrate urine. She was not completely anuric however due to ongoing needs for hemodialysis a tunneled PermCath was placed by Interventional Radiology on 2/29. Prior cultures w/ proteus mirabilis and bacteroides. She was treated with broad spectrum antibiotics and completed antibiotics 2/22. Patient was discharged to LTAC for wound care, therapy, respiratory and trach management on 3/5. She developed a fever 100.3 F with WBC trending up while at LTAC yesterday. She was noted to have copious malodorous purulence drainage from her wound and was started on Daptomycin and Meropenum by ID yesterday and was recommend for patient to be transferred to the ED for CT abd.    In the ED, patient fevered at 100.3, was tachycardiac, tachypneic and sating at 100% on 5 L/min and FiO2 of 28 via trach. Labs notable for WBC 14.18, Hgb 8.9, Bicarb 20, , Cr 2.6, Phos 6.1, CRP 27.5. UA with WBC 28/hpf, Leukocyte esterases +1 concerning for UTI. CT A/P showed open wound of the right mons pubis with surrounding subcutaneous stranding and some trace subcutaneous gas concerning for infection. Gen surgery evaluated in ED; recommended admission to hospital medicine management of her chronic conditions.     History limited by  patient's condition.

## 2024-03-08 NOTE — ASSESSMENT & PLAN NOTE
Nonoliguric ALVARADO on baseline normal renal functin  - etiology for ALVARADO 2/2 to ischemic ATN in setting of septic shock.   - was in recovery process; UOP adequate; BUN 2/2 to azotemia from elevated protein content.   - perm cath placed on 02/29/24; has not had iHD since.   - now presented with ALVARADO ~3 from ~2.4 yesterday suspect 2/2 to hypotension/prerenal insult.   Plan:  - Will plan for iHD today; 3hrs low flows for metabolic clearance only  - nephro tube feeds; no need for high protein at this time.   - no need for scheduled diuretics; okay to use PRN for volume overload  - defer to ID for line holiday; if patient continues to show signs of renal recovery would recommend removal of perm cath  - strict ins and outs  - renally dose all med  - avoid nephrotoxins.

## 2024-03-08 NOTE — ED TRIAGE NOTES
Sarah Donatoey, a 53 y.o. female presents to the ED w/ complaint of wound infection. Pt non-verbal and bed bound @ baseline.    Triage note:  Chief Complaint   Patient presents with    Wound Infection     From [facility]. They reported fever. Gangrene worsening.     Review of patient's allergies indicates:  No Known Allergies  No past medical history on file.

## 2024-03-09 LAB
ALBUMIN SERPL BCP-MCNC: 2.6 G/DL (ref 3.5–5.2)
ALP SERPL-CCNC: 172 U/L (ref 55–135)
ALT SERPL W/O P-5'-P-CCNC: 31 U/L (ref 10–44)
ANION GAP SERPL CALC-SCNC: 13 MMOL/L (ref 8–16)
AST SERPL-CCNC: 38 U/L (ref 10–40)
BASOPHILS # BLD AUTO: 0.02 K/UL (ref 0–0.2)
BASOPHILS NFR BLD: 0.1 % (ref 0–1.9)
BILIRUB SERPL-MCNC: 0.2 MG/DL (ref 0.1–1)
BUN SERPL-MCNC: 96 MG/DL (ref 6–20)
CALCIUM SERPL-MCNC: 9.8 MG/DL (ref 8.7–10.5)
CHLORIDE SERPL-SCNC: 103 MMOL/L (ref 95–110)
CO2 SERPL-SCNC: 21 MMOL/L (ref 23–29)
CREAT SERPL-MCNC: 3.2 MG/DL (ref 0.5–1.4)
DIFFERENTIAL METHOD BLD: ABNORMAL
EOSINOPHIL # BLD AUTO: 0.9 K/UL (ref 0–0.5)
EOSINOPHIL NFR BLD: 6.4 % (ref 0–8)
ERYTHROCYTE [DISTWIDTH] IN BLOOD BY AUTOMATED COUNT: 16.4 % (ref 11.5–14.5)
EST. GFR  (NO RACE VARIABLE): 16.7 ML/MIN/1.73 M^2
GLUCOSE SERPL-MCNC: 194 MG/DL (ref 70–110)
HCT VFR BLD AUTO: 28.7 % (ref 37–48.5)
HGB BLD-MCNC: 8.7 G/DL (ref 12–16)
IMM GRANULOCYTES # BLD AUTO: 0.09 K/UL (ref 0–0.04)
IMM GRANULOCYTES NFR BLD AUTO: 0.7 % (ref 0–0.5)
LYMPHOCYTES # BLD AUTO: 2.4 K/UL (ref 1–4.8)
LYMPHOCYTES NFR BLD: 17.4 % (ref 18–48)
MAGNESIUM SERPL-MCNC: 2 MG/DL (ref 1.6–2.6)
MCH RBC QN AUTO: 28 PG (ref 27–31)
MCHC RBC AUTO-ENTMCNC: 30.3 G/DL (ref 32–36)
MCV RBC AUTO: 92 FL (ref 82–98)
MONOCYTES # BLD AUTO: 1 K/UL (ref 0.3–1)
MONOCYTES NFR BLD: 7.6 % (ref 4–15)
NEUTROPHILS # BLD AUTO: 9.2 K/UL (ref 1.8–7.7)
NEUTROPHILS NFR BLD: 67.8 % (ref 38–73)
NRBC BLD-RTO: 0 /100 WBC
PHOSPHATE SERPL-MCNC: 4.4 MG/DL (ref 2.7–4.5)
PLATELET # BLD AUTO: 421 K/UL (ref 150–450)
PMV BLD AUTO: 10.6 FL (ref 9.2–12.9)
POCT GLUCOSE: 160 MG/DL (ref 70–110)
POCT GLUCOSE: 179 MG/DL (ref 70–110)
POCT GLUCOSE: 184 MG/DL (ref 70–110)
POCT GLUCOSE: 216 MG/DL (ref 70–110)
POCT GLUCOSE: 222 MG/DL (ref 70–110)
POTASSIUM SERPL-SCNC: 5.2 MMOL/L (ref 3.5–5.1)
PROT SERPL-MCNC: 9.2 G/DL (ref 6–8.4)
RBC # BLD AUTO: 3.11 M/UL (ref 4–5.4)
SODIUM SERPL-SCNC: 137 MMOL/L (ref 136–145)
WBC # BLD AUTO: 13.62 K/UL (ref 3.9–12.7)

## 2024-03-09 PROCEDURE — 83735 ASSAY OF MAGNESIUM: CPT

## 2024-03-09 PROCEDURE — 80053 COMPREHEN METABOLIC PANEL: CPT

## 2024-03-09 PROCEDURE — 99900035 HC TECH TIME PER 15 MIN (STAT)

## 2024-03-09 PROCEDURE — 20600001 HC STEP DOWN PRIVATE ROOM

## 2024-03-09 PROCEDURE — 25000242 PHARM REV CODE 250 ALT 637 W/ HCPCS

## 2024-03-09 PROCEDURE — 99900026 HC AIRWAY MAINTENANCE (STAT)

## 2024-03-09 PROCEDURE — 27000221 HC OXYGEN, UP TO 24 HOURS

## 2024-03-09 PROCEDURE — 36415 COLL VENOUS BLD VENIPUNCTURE: CPT

## 2024-03-09 PROCEDURE — 27000207 HC ISOLATION

## 2024-03-09 PROCEDURE — 84100 ASSAY OF PHOSPHORUS: CPT

## 2024-03-09 PROCEDURE — 27100171 HC OXYGEN HIGH FLOW UP TO 24 HOURS

## 2024-03-09 PROCEDURE — 94761 N-INVAS EAR/PLS OXIMETRY MLT: CPT

## 2024-03-09 PROCEDURE — 63600175 PHARM REV CODE 636 W HCPCS

## 2024-03-09 PROCEDURE — 25000003 PHARM REV CODE 250

## 2024-03-09 PROCEDURE — 85025 COMPLETE CBC W/AUTO DIFF WBC: CPT

## 2024-03-09 PROCEDURE — 27201109 HC SYSTEM FECAL MANAGEMENT

## 2024-03-09 RX ADMIN — SEVELAMER CARBONATE 1.6 G: 800 POWDER, FOR SUSPENSION ORAL at 10:03

## 2024-03-09 RX ADMIN — INSULIN ASPART 8 UNITS: 100 INJECTION, SOLUTION INTRAVENOUS; SUBCUTANEOUS at 09:03

## 2024-03-09 RX ADMIN — INSULIN ASPART 8 UNITS: 100 INJECTION, SOLUTION INTRAVENOUS; SUBCUTANEOUS at 05:03

## 2024-03-09 RX ADMIN — SEVELAMER CARBONATE 1.6 G: 800 POWDER, FOR SUSPENSION ORAL at 03:03

## 2024-03-09 RX ADMIN — CARVEDILOL 25 MG: 25 TABLET, FILM COATED ORAL at 10:03

## 2024-03-09 RX ADMIN — PSYLLIUM HUSK 1 PACKET: 3.4 POWDER ORAL at 10:03

## 2024-03-09 RX ADMIN — Medication 500 MG: at 10:03

## 2024-03-09 RX ADMIN — INSULIN ASPART 8 UNITS: 100 INJECTION, SOLUTION INTRAVENOUS; SUBCUTANEOUS at 04:03

## 2024-03-09 RX ADMIN — INSULIN ASPART 8 UNITS: 100 INJECTION, SOLUTION INTRAVENOUS; SUBCUTANEOUS at 02:03

## 2024-03-09 RX ADMIN — INSULIN ASPART 2 UNITS: 100 INJECTION, SOLUTION INTRAVENOUS; SUBCUTANEOUS at 05:03

## 2024-03-09 RX ADMIN — INSULIN DETEMIR 14 UNITS: 100 INJECTION, SOLUTION SUBCUTANEOUS at 10:03

## 2024-03-09 RX ADMIN — HEPARIN SODIUM 7500 UNITS: 5000 INJECTION INTRAVENOUS; SUBCUTANEOUS at 10:03

## 2024-03-09 RX ADMIN — HEPARIN SODIUM 7500 UNITS: 5000 INJECTION INTRAVENOUS; SUBCUTANEOUS at 02:03

## 2024-03-09 RX ADMIN — HEPARIN SODIUM 7500 UNITS: 5000 INJECTION INTRAVENOUS; SUBCUTANEOUS at 06:03

## 2024-03-09 RX ADMIN — INSULIN ASPART 8 UNITS: 100 INJECTION, SOLUTION INTRAVENOUS; SUBCUTANEOUS at 10:03

## 2024-03-09 RX ADMIN — MEROPENEM 2 G: 1 INJECTION INTRAVENOUS at 06:03

## 2024-03-09 RX ADMIN — MUPIROCIN: 20 OINTMENT TOPICAL at 10:03

## 2024-03-09 RX ADMIN — ZINC SULFATE 220 MG (50 MG) CAPSULE 220 MG: CAPSULE at 10:03

## 2024-03-09 RX ADMIN — INSULIN ASPART 4 UNITS: 100 INJECTION, SOLUTION INTRAVENOUS; SUBCUTANEOUS at 02:03

## 2024-03-09 RX ADMIN — INSULIN ASPART 2 UNITS: 100 INJECTION, SOLUTION INTRAVENOUS; SUBCUTANEOUS at 04:03

## 2024-03-09 RX ADMIN — INSULIN ASPART 1 UNITS: 100 INJECTION, SOLUTION INTRAVENOUS; SUBCUTANEOUS at 09:03

## 2024-03-09 RX ADMIN — INSULIN ASPART 8 UNITS: 100 INJECTION, SOLUTION INTRAVENOUS; SUBCUTANEOUS at 12:03

## 2024-03-09 RX ADMIN — PANTOPRAZOLE SODIUM 40 MG: 40 GRANULE, DELAYED RELEASE ORAL at 10:03

## 2024-03-09 RX ADMIN — CHLORHEXIDINE GLUCONATE 0.12% ORAL RINSE 15 ML: 1.2 LIQUID ORAL at 10:03

## 2024-03-09 RX ADMIN — CHLORHEXIDINE GLUCONATE 0.12% ORAL RINSE 15 ML: 1.2 LIQUID ORAL at 09:03

## 2024-03-09 RX ADMIN — MEROPENEM 2 G: 1 INJECTION INTRAVENOUS at 07:03

## 2024-03-09 RX ADMIN — INSULIN ASPART 1 UNITS: 100 INJECTION, SOLUTION INTRAVENOUS; SUBCUTANEOUS at 12:03

## 2024-03-09 RX ADMIN — AMLODIPINE BESYLATE 10 MG: 10 TABLET ORAL at 10:03

## 2024-03-09 RX ADMIN — INSULIN ASPART 4 UNITS: 100 INJECTION, SOLUTION INTRAVENOUS; SUBCUTANEOUS at 10:03

## 2024-03-09 RX ADMIN — DAPTOMYCIN 1040 MG: 350 INJECTION, POWDER, LYOPHILIZED, FOR SOLUTION INTRAVENOUS at 06:03

## 2024-03-09 NOTE — SUBJECTIVE & OBJECTIVE
Interval History: NAEON. Pt rectal tube fell out stool within bed. Pt remains non-verbal, non-responsive.    Medications:  Continuous Infusions:  Scheduled Meds:   amLODIPine  10 mg Per G Tube Daily    ascorbic acid (vitamin C)  500 mg Per G Tube BID    carvediloL  25 mg Per G Tube BID    chlorhexidine  15 mL Mouth/Throat BID    DAPTOmycin (CUBICIN) IV (PEDS and ADULTS)  8 mg/kg Intravenous Q24H    heparin (porcine)  7,500 Units Subcutaneous Q8H    insulin aspart U-100  8 Units Subcutaneous Q4H    insulin detemir U-100  14 Units Subcutaneous Daily    meropenem (MERREM) IVPB  2 g Intravenous Q12H    mupirocin   Nasal BID    pantoprazole  40 mg Per G Tube Daily    psyllium husk (aspartame)  1 packet Per G Tube Daily    sevelamer carbonate  1.6 g Per G Tube TID    zinc sulfate  220 mg Per G Tube Daily     PRN Meds:acetaminophen, albuterol-ipratropium, dextrose 10%, dextrose 10%, glucagon (human recombinant), heparin (porcine), hydrALAZINE, insulin aspart U-100, labetalol, naloxone, ondansetron, prochlorperazine, sodium chloride 0.9%     Review of patient's allergies indicates:  No Known Allergies  Objective:     Vital Signs (Most Recent):  Temp: 97.6 °F (36.4 °C) (03/09/24 0731)  Pulse: 93 (03/09/24 0731)  Resp: 20 (03/09/24 0731)  BP: 129/60 (03/09/24 0731)  SpO2: 97 % (03/09/24 0731) Vital Signs (24h Range):  Temp:  [97.6 °F (36.4 °C)-99.3 °F (37.4 °C)] 97.6 °F (36.4 °C)  Pulse:  [] 93  Resp:  [16-26] 20  SpO2:  [97 %-100 %] 97 %  BP: (120-167)/(60-83) 129/60     Weight: (!) 140.7 kg (310 lb 3 oz)  Body mass index is 47.16 kg/m².    Intake/Output - Last 3 Shifts         03/07 0700  03/08 0659 03/08 0700  03/09 0659 03/09 0700  03/10 0659    NG/GT  1450     IV Piggyback 142.2 150     Total Intake(mL/kg) 142.2 (1.1) 1600 (11.4)     Urine (mL/kg/hr) 400 350 (0.1)     Other  550     Stool  150     Total Output 400 1050     Net -257.8 +550                     Physical Exam  Constitutional:       Comments: Not  following commands or responding   HENT:      Head: Normocephalic.   Cardiovascular:      Rate and Rhythm: Normal rate and regular rhythm.   Pulmonary:      Effort: Pulmonary effort is normal. No respiratory distress.   Abdominal:      General: There is no distension.   Musculoskeletal:      Comments: Occassionally moves upper extremities nonpurposefully   Skin:     General: Skin is warm and dry.      Comments: Posterior wound with drainage   Neurological:      Comments: Unresponsive, unchanged          Significant Labs:  I have reviewed all pertinent lab results within the past 24 hours.    Significant Diagnostics:  I have reviewed all pertinent imaging results/findings within the past 24 hours.

## 2024-03-09 NOTE — PLAN OF CARE
Patient remains free of injury. No evidence of pain. Continued on IV Abx. Patient had 2 bowel movements. Rectal tube inadvertently came out and was replaced. TF continued. Tolerating well. Dressing changed performed by surgeon this am. Wounds cleanse and new abd pad placed by RN this evening. CHG bath given. Suctioned PRN. Turned q2-3 hrs. Plan of care reviewed with patient's daughter Aleks. Verbalizes understanding.

## 2024-03-09 NOTE — PROGRESS NOTES
03/08/24 1902   Vital Signs   Temp 99.1 °F (37.3 °C)   Temp Source Axillary   Resp (!) 26   SpO2 100 %   Pulse Oximetry Type Continuous   Flow (L/min) 5   Device (Oxygen Therapy) tracheostomy collar   BP (!) 148/72   MAP (mmHg) 99   BP Location Right arm   BP Method Automatic   Patient Position Lying     Patient admitted to [facility] from ED. Pt is non-verbal. TF infusing at 50 ml/hr. Glynn and flexiseal intact. Respiratory at the bedside performing trach care. Call system within reach. Side rails up x2. Will continue to monitor.

## 2024-03-09 NOTE — NURSING
Duration of HD tx 2.5 hours due to late arrival.   No fluid removed.     Patient tolerated well.    Blood returned. Lines flushed with NS. Catheter locked with Heparin. Clamped, capped and wrapped with sterile gauze.     HD catheter dressing changed.     Patient transported to room 17722 with a Respiratory tech and a RN.     Report given to TRISTON Martinez RN.

## 2024-03-09 NOTE — PLAN OF CARE
Problem: Adult Inpatient Plan of Care  Goal: Plan of Care Review  Outcome: Ongoing, Progressing  Goal: Patient-Specific Goal (Individualized)  Outcome: Ongoing, Progressing  Goal: Absence of Hospital-Acquired Illness or Injury  Outcome: Ongoing, Progressing  Goal: Optimal Comfort and Wellbeing  Outcome: Ongoing, Progressing  Goal: Readiness for Transition of Care  Outcome: Ongoing, Progressing     Problem: Bariatric Environmental Safety  Goal: Safety Maintained with Care  Outcome: Ongoing, Progressing     Problem: Device-Related Complication Risk (Hemodialysis)  Goal: Safe, Effective Therapy Delivery  Outcome: Ongoing, Progressing     Problem: Hemodynamic Instability (Hemodialysis)  Goal: Effective Tissue Perfusion  Outcome: Ongoing, Progressing     Problem: Infection (Hemodialysis)  Goal: Absence of Infection Signs and Symptoms  Outcome: Ongoing, Progressing     Problem: Diabetes Comorbidity  Goal: Blood Glucose Level Within Targeted Range  Outcome: Ongoing, Progressing     Problem: Adjustment to Illness (Sepsis/Septic Shock)  Goal: Optimal Coping  Outcome: Ongoing, Progressing     Problem: Bleeding (Sepsis/Septic Shock)  Goal: Absence of Bleeding  Outcome: Ongoing, Progressing     Problem: Glycemic Control Impaired (Sepsis/Septic Shock)  Goal: Blood Glucose Level Within Desired Range  Outcome: Ongoing, Progressing     Problem: Infection Progression (Sepsis/Septic Shock)  Goal: Absence of Infection Signs and Symptoms  Outcome: Ongoing, Progressing     Problem: Nutrition Impaired (Sepsis/Septic Shock)  Goal: Optimal Nutrition Intake  Outcome: Ongoing, Progressing     Problem: Fluid and Electrolyte Imbalance (Acute Kidney Injury/Impairment)  Goal: Fluid and Electrolyte Balance  Outcome: Ongoing, Progressing     Problem: Oral Intake Inadequate (Acute Kidney Injury/Impairment)  Goal: Optimal Nutrition Intake  Outcome: Ongoing, Progressing     Problem: Renal Function Impairment (Acute Kidney Injury/Impairment)  Goal:  Effective Renal Function  Outcome: Ongoing, Progressing     Problem: Infection  Goal: Absence of Infection Signs and Symptoms  Outcome: Ongoing, Progressing     Problem: Impaired Wound Healing  Goal: Optimal Wound Healing  Outcome: Ongoing, Progressing     Problem: Fall Injury Risk  Goal: Absence of Fall and Fall-Related Injury  Outcome: Ongoing, Progressing

## 2024-03-09 NOTE — PROGRESS NOTES
[facility] ([place] [address])  [facility] Medicine  Progress Note    Patient Name: Sarah Saravia  MRN: [id number]  Patient Class: IP- Inpatient   Admission Date: 3/7/2024  Length of Stay: 1 days  Attending Physician: Janett Jean MD  [facility]        Subjective:     Principal Problem:Sepsis        HPI:  53 y.o. female with hx of recent Ayaz's gangrene s/p surgical debridement, T2DM, AHRF s/p tracheostomy, embolic CVA, presents to the ED from LTAC with low grade fevers. 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 Hospital course was complicated by DKA, ART requiring RRT, acute respiratory failure requiring intubation and subsequent tracheostomy, and was found to have multiple infarcts on MRI brain with minimal spontaneous movements.  Palliative consulted however family wanted to proceed with trach (8.0/85 mm cuffed), PEG placement on 2/22/24. She was also evaluated by Nephrology for inability to fully concentrate urine. She was not completely anuric however due to ongoing needs for hemodialysis a tunneled PermCath was placed by Interventional Radiology on 2/29. Prior cultures w/ proteus mirabilis and bacteroides. She was treated with broad spectrum antibiotics and completed antibiotics 2/22. Patient was discharged to LTAC for wound care, therapy, respiratory and trach management on 3/5. She developed a fever 100.3 F with WBC trending up while at LTAC yesterday. She was noted to have copious malodorous purulence drainage from her wound and was started on Daptomycin and Meropenum by ID yesterday and was recommend for patient to be transferred to the ED for CT abd.    In the ED, patient fevered at 100.3, was tachycardiac, tachypneic and sating at 100% on 5 L/min and FiO2 of 28 via trach. Labs notable for WBC 14.18, Hgb 8.9, Bicarb 20, ,  Cr 2.6, Phos 6.1, CRP 27.5. UA with WBC 28/hpf, Leukocyte esterases +1 concerning for UTI. CT A/P showed open wound of the right mons pubis with surrounding subcutaneous stranding and some trace subcutaneous gas concerning for infection. Gen surgery evaluated in ED; recommended admission to hospital medicine management of her chronic conditions.     History limited by patient's condition.    Overview/Hospital Course:  Multiple teams consulted, Gen surg evaluated wounds, dropped recs with no plans for formal debridement at this time. ID rec continuing abx, nutrition continue tube feeds, nephrology following for HD, continue as scheduled. Wounds grew GNR, f/u speciation and shireen.     Interval Hx: NAEON, VSS, HD yesterday    No past medical history on file.    Past Surgical History:   Procedure Laterality Date    CLOSURE OF WOUND Right 2/22/2024    Procedure: CLOSURE, WOUND;  Surgeon: Steve Cole MD;  Location: [address];  Service: General;  Laterality: Right;    ESOPHAGOGASTRODUODENOSCOPY W/ PEG N/A 2/22/2024    Procedure: EGD, WITH PEG TUBE INSERTION;  Surgeon: Steve Cole MD;  Location: [address];  Service: General;  Laterality: N/A;    INCISION AND DRAINAGE OF PERIRECTAL REGION N/A 1/29/2024    Procedure: INCISION AND DRAINAGE, PERIRECTAL REGION;  Surgeon: Axel Ramsay MD;  Location: [facility] OR;  Service: General;  Laterality: N/A;    LUMBAR PUNCTURE N/A 2/16/2024    Procedure: Lumbar Puncture;  Surgeon: Macy Boykin;  Location: [facility];  Service: Anesthesiology;  Laterality: N/A;    PLACEMENT, TRIALYSIS CATH Right 1/31/2024    Procedure: INSERTION, CATHETER, TRIPLE LUMEN, HEMODIALYSIS, TEMPORARY;  Surgeon: Alvin Junior MD;  Location: [facility] OR;  Service: General;  Laterality: Right;    REPLACEMENT OF WOUND VACUUM-ASSISTED CLOSURE DEVICE Right 2/12/2024    Procedure: REPLACEMENT, WOUND VAC;  Surgeon: Mundo Carmona MD;  Location: [facility] OR [address] [place] [address];  Service: General;  Laterality: Right;     REPLACEMENT OF WOUND VACUUM-ASSISTED CLOSURE DEVICE N/A 2/15/2024    Procedure: REPLACEMENT, WOUND VAC;  Surgeon: Steve Cole MD;  [facility];  Service: General;  Laterality: N/A;    REPLACEMENT OF WOUND VACUUM-ASSISTED CLOSURE DEVICE Right 2/19/2024    Procedure: REPLACEMENT, WOUND VAC;  Surgeon: Steve Cole MD;  [facility];  Service: General;  Laterality: Right;  RLE/groin    REPLACEMENT OF WOUND VACUUM-ASSISTED CLOSURE DEVICE Right 2/22/2024    Procedure: REPLACEMENT, WOUND VAC;  Surgeon: Steve Cole MD;  [facility];  Service: General;  Laterality: Right;    TRACHEOSTOMY N/A 2/22/2024    Procedure: CREATION, TRACHEOSTOMY;  Surgeon: Steve Cole MD;  [facility];  Service: General;  Laterality: N/A;    WOUND DEBRIDEMENT Bilateral 2/2/2024    Procedure: DEBRIDEMENT, WOUND;  Surgeon: Steve Cole MD;  Location: [facility] OR [address] [place] [address];  Service: General;  Laterality: Bilateral;  Bilateral groin  Possible wound vac placement    WOUND DEBRIDEMENT Right 2/6/2024    Procedure: DEBRIDEMENT, WOUND, replace wound vac, possible closure;  Surgeon: Steve Cole MD;  [facility];  Service: General;  Laterality: Right;  RLE    WOUND DEBRIDEMENT Right 2/9/2024    Procedure: DEBRIDEMENT, WOUND w wound vac change;  Surgeon: Steve Cole MD;  Location: [address];  Service: General;  Laterality: Right;  RLE    WOUND DEBRIDEMENT Right 2/12/2024    Procedure: R thigh wound debridement;  Surgeon: Mundo Carmona MD;  Location: [address];  Service: General;  Laterality: Right;    WOUND EXPLORATION Right 1/31/2024    Procedure: IRRIGATION & DEBRIDEMENT, WOUND DEBRIDEMENT;  Surgeon: Alvin Junior MD;  Location: [facility];  Service: General;  Laterality: Right;       Review of patient's allergies indicates:  No Known Allergies    No current facility-administered medications on file prior to encounter.     No current outpatient medications  on file prior to encounter.     Family History    None       Tobacco Use    Smoking status: Unknown    Smokeless tobacco: Not on file   Substance and Sexual Activity    Alcohol use: Not on file    Drug use: Not on file    Sexual activity: Not on file     Review of Systems   Unable to perform ROS: Acuity of condition     Objective:     Vital Signs (Most Recent):  Temp: 98.4 °F (36.9 °C) (03/09/24 1138)  Pulse: 93 (03/09/24 1138)  Resp: 19 (03/09/24 1138)  BP: 135/69 (03/09/24 1138)  SpO2: 100 % (03/09/24 1138) Vital Signs (24h Range):  Temp:  [97.6 °F (36.4 °C)-99.3 °F (37.4 °C)] 98.4 °F (36.9 °C)  Pulse:  [] 93  Resp:  [16-26] 19  SpO2:  [97 %-100 %] 100 %  BP: (120-167)/(60-83) 135/69     Weight: (!) 140.7 kg (310 lb 3 oz)  Body mass index is 47.16 kg/m².     Physical Exam  Vitals and nursing note reviewed.   Constitutional:       General: She is not in acute distress.     Appearance: She is obese. She is ill-appearing and toxic-appearing.      Comments: Unable to respond or follow commands   HENT:      Head: Atraumatic.   Neck:      Comments: Tracheostomy in place    Cardiovascular:      Rate and Rhythm: Normal rate and regular rhythm.      Pulses: Normal pulses.      Heart sounds: Normal heart sounds.   Pulmonary:      Effort: Pulmonary effort is normal.      Breath sounds: Normal breath sounds.   Abdominal:      General: Abdomen is flat.      Palpations: Abdomen is soft.   Musculoskeletal:         General: Normal range of motion.   Skin:     Comments: Skin assessment - No areas of ulceration or blistering of bony prominences.    Neurological:      General: No focal deficit present.      Mental Status: She is oriented to person, place, and time.   Psychiatric:         Mood and Affect: Mood normal.         Behavior: Behavior normal.                Significant Labs: All pertinent labs within the past 24 hours have been reviewed.    Significant Imaging: I have reviewed all pertinent imaging results/findings  within the past 24 hours.    Assessment/Plan:      * Sepsis  53 y.o. y/o female with a PMHx of Ayaz's gangrene who presented with symptoms of fever with concerns for sepsis. Suspected source likely Cellulitis and UTI. Met SIRS criteria with RR 33, Temp 100.3, WBC 14.11. Started on Daptomycin and Meropenum.     - See Ayaz's gangrene  - Continue Daptomycin and meropenum      UTI (urinary tract infection)  UA significant for WBC >100/hpf with occasion bacteria     - see sepsis      Diarrhea  Rectal tube in place     - Continue psyllium     HTN (hypertension)  -Continue Home amlodipine 10 daily and Coreg 25 BID   - Hydralazine and Labetelol PRN    Dysphagia  Likely 2/2 to CVA. S/p PEG placement. On TF at LTAC    - Nutrition consulted   - Resume Tube feeds      Status post tracheostomy  S/P tracheostomy on 2/22. Per chart review, pulm considering trach exchange early next week.     - Continue trach collar as tolerated  - Wean oxygen for goal spO2>90%  - Continue duonebs prn   - routine trach care  - Suction q4hr       Encephalopathy  Likely 2/2 to embolic CVA during prior admission. CTH 2/3, MRI 2/5 with scattered hypoattenuation likely representing infarcts. EEG findings consistent with moderate-severe encephalopathy.  Repeat CTH and MRI/MRA unchanged from prior exams.LP 2/16. Elevated WBCs and protein consistent with bacterial meningitis. CSF culture 2/19 with no growth    - Delirium precautions  - PT/OT/ST consults    Type 2 diabetes mellitus, with long-term current use of insulin  Initially presented with DKA  A1c 10.4 on prior admission.     -  q4h BG monitoring while NPO per endocrine  - Detemir 14 units daily  - Novolog aspart 8units q4h while on TFs  - Moderate dose SSI PRN      Renal failure  Developed ALVARADO most likley ATN in setting of septic shock; requiring RRT during prior admission. Initially needed hemodialysis s/p tunneled PermCath placement. Showed signs of renal recovery but now worsening w/ Cr  at 3.0 on admission.     - Nephrology consult  - Daily Renal Function Panel   - Avoid Hypotension.  - Renally dose all meds  - Please avoid nephrotoxins, including NSAIDs, aminoglycosides, IV contrast (unless absolutely necessary), gadolinium, fleets and other phosphorous-based laxatives. Caution with antibiotics.      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 After a prolonged hospital course she was discharged to LTAC on 3/7 however is now representing on 3/8 with fever, pyuria, and drainage from her posterior wound. Was seen by ID in LTAC and was started on Daptomycin and Meropenum prior to arrival.  CT A/P showed open wound of the right mons pubis with surrounding subcutaneous stranding and some trace subcutaneous gas concerning for infection.    - Surgery following; f/u cultures, no plans for formal debridement at this time  - Continue Daptomycin and Meropenem  - ID consulted; continue abx  - Follow up on wound cx GNR f/u spec and shireen, urine cx NGTD, blood cx NGTD  - Wound care consulted       VTE Risk Mitigation (From admission, onward)           Ordered     heparin (porcine) injection 1,000 Units  As needed (PRN)         03/08/24 1715     heparin (porcine) injection 7,500 Units  Every 8 hours         03/08/24 0334                    Discharge Planning   ZEKE: 3/10/2024     Code Status: Full Code   Is the patient medically ready for discharge?: No    Reason for patient still in hospital (select all that apply): Treatment, Consult recommendations, and Pending disposition                     Terry Guillen MD  Department of Hospital Medicine   [facility] ([place]-[facility])

## 2024-03-09 NOTE — PLAN OF CARE
Patient trached and unable to speak. Assessment completed with manuel Duran by phone. Prior to hospitalization patient was independent, working and driving. Since CVA patient is unable to care for herself. Patient was living at home with SO but now daughter states she has plans to move in with her mother if needed to provide assistance. Patient has no home DME. Will need to establish care with PCP and will need transportation home/facility.   03/09/24 1246   Discharge Assessment   Assessment Type Discharge Planning Assessment   Confirmed/corrected address, phone number and insurance Yes   Confirmed Demographics Correct on Facesheet   Source of Information family   Communicated ZEKE with patient/caregiver Date not available/Unable to determine   People in Home significant other   Do you expect to return to your current living situation? Yes   Do you have help at home or someone to help you manage your care at home? Yes   Who are your caregiver(s) and their phone number(s)? ZAC DURAN (Daughter)  [phone] (Mobile)   Prior to hospitilization cognitive status: Alert/Oriented   Current cognitive status: Unable to Assess   Walking or Climbing Stairs Difficulty yes   Walking or Climbing Stairs transferring difficulty, requires equipment   Dressing/Bathing Difficulty yes   Dressing/Bathing bathing difficulty, dependent   Readmission within 30 days? Yes   Do you currently have service(s) that help you manage your care at home? No   Do you take prescription medications? Yes   Do you have prescription coverage? Yes   Do you have any problems affording any of your prescribed medications? Yes   Is the patient taking medications as prescribed? no   How do you get to doctors appointments? health plan transportation;family or friend will provide   Are you on dialysis? Yes   Dialysis Name and Scheduled days New HD   Discharge Plan A Skilled Nursing Facility   Discharge Plan B New Nursing Home placement - [facility] care  facility   DME Needed Upon Discharge  wheelchair;lift device;hospital bed   Discharge Plan discussed with: Adult children   Transition of Care Barriers Mobility;Underinsured   Physical Activity   On average, how many days per week do you engage in moderate to strenuous exercise (like a brisk walk)? 0 days   On average, how many minutes do you engage in exercise at this level? 0 min   Financial Resource Strain   How hard is it for you to pay for the very basics like food, housing, medical care, and heating? Very Hard   Housing Stability   In the last 12 months, was there a time when you were not able to pay the mortgage or rent on time? Y   In the last 12 months, was there a time when you did not have a steady place to sleep or slept in a shelter (including now)? N   Transportation Needs   In the past 12 months, has lack of transportation kept you from medical appointments or from getting medications? no   In the past 12 months, has lack of transportation kept you from meetings, work, or from getting things needed for daily living? No   Food Insecurity   Within the past 12 months, you worried that your food would run out before you got the money to buy more. Never true   Within the past 12 months, the food you bought just didn't last and you didn't have money to get more. Never true   Stress   Do you feel stress - tense, restless, nervous, or anxious, or unable to sleep at night because your mind is troubled all the time - these days? Pt Unable   Social Connections   In a typical week, how many times do you talk on the phone with family, friends, or neighbors? More than 3   How often do you get together with friends or relatives? More than 3   How often do you attend Adventist or Mandaeism services? Never   Do you belong to any clubs or organizations such as Adventist groups, unions, fraternal or athletic groups, or school groups? No   How often do you attend meetings of the clubs or organizations you belong to? Never    Are you , , , , never , or living with a partner? Never marrie   Alcohol Use   Q1: How often do you have a drink containing alcohol? Never   Q2: How many drinks containing alcohol do you have on a typical day when you are drinking? None   Q3: How often do you have six or more drinks on one occasion? Never     [facility] ([place]14)  Initial Discharge Assessment       Primary Care Provider: [facility]    Admission Diagnosis: Tachypnea [R06.82]  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]  Tachycardia [R00.0]  Soft tissue infection [L08.9]  Chest pain [R07.9]  Sepsis [A41.9]  Urinary tract infection without hematuria, site unspecified [N39.0]    Admission Date: 3/7/2024  Expected Discharge Date: 3/10/2024    Transition of Care Barriers: (P) Mobility, Underinsured    Payor: MEDICAID / Plan: [employer] / Product Type: Managed Medicaid /     Extended Emergency Contact Information  Primary Emergency Contact: ZAC NEWMAN  Mobile Phone: [phone]  Relation: Daughter  Secondary Emergency Contact: Osvaldo Gallegos  Mobile Phone: [phone]  Relation: Significant other    Discharge Plan A: (P) Skilled Nursing Facility  Discharge Plan B: (P) New Nursing Home placement - nursing home care facility      [id number]  [place], [place]  [address]  [address]  [place] [address]  Phone: [phone] Fax: [phone]      Initial Assessment (most recent)       Adult Discharge Assessment - 03/09/24 1246          Discharge Assessment    Assessment Type Discharge Planning Assessment (P)      Confirmed/corrected address, phone number and insurance Yes (P)      Confirmed Demographics Correct on Facesheet (P)      Source of Information family (P)      Communicated ZEKE with patient/caregiver Date not available/Unable to determine (P)      People in Home significant other (P)       Do you expect to return to your current living situation? Yes (P)      Do you have help at home or someone to help you manage your care at home? Yes (P)      Who are your caregiver(s) and their phone number(s)? ZAC NEWMAN (Daughter)  [phone] (Mobile) (P)      Prior to hospitilization cognitive status: Alert/Oriented (P)      Current cognitive status: Unable to Assess (P)      Walking or Climbing Stairs Difficulty yes (P)      Walking or Climbing Stairs transferring difficulty, requires equipment (P)      Dressing/Bathing Difficulty yes (P)      Dressing/Bathing bathing difficulty, dependent (P)      Readmission within 30 days? Yes (P)      Do you currently have service(s) that help you manage your care at home? No (P)      Do you take prescription medications? Yes (P)      Do you have prescription coverage? Yes (P)      Do you have any problems affording any of your prescribed medications? Yes (P)      Is the patient taking medications as prescribed? no (P)      How do you get to doctors appointments? health plan transportation;family or friend will provide (P)      Are you on dialysis? Yes (P)      Dialysis Name and Scheduled days New HD (P)      Discharge Plan A Skilled Nursing Facility (P)      Discharge Plan B New Nursing Home placement - longterm care facility (P)      DME Needed Upon Discharge  wheelchair;lift device;hospital bed (P)      Discharge Plan discussed with: Adult children (P)      Transition of Care Barriers Mobility;Underinsured (P)         Physical Activity    On average, how many days per week do you engage in moderate to strenuous exercise (like a brisk walk)? 0 days (P)      On average, how many minutes do you engage in exercise at this level? 0 min (P)         Financial Resource Strain    How hard is it for you to pay for the very basics like food, housing, medical care, and heating? Very hard (P)         Housing Stability    In the last 12 months, was there a time when you were not  able to pay the mortgage or rent on time? Yes (P)      In the last 12 months, was there a time when you did not have a steady place to sleep or slept in a shelter (including now)? No (P)         Transportation Needs    In the past 12 months, has lack of transportation kept you from medical appointments or from getting medications? No (P)      In the past 12 months, has lack of transportation kept you from meetings, work, or from getting things needed for daily living? No (P)         Food Insecurity    Within the past 12 months, you worried that your food would run out before you got the money to buy more. Never true (P)      Within the past 12 months, the food you bought just didn't last and you didn't have money to get more. Never true (P)         Stress    Do you feel stress - tense, restless, nervous, or anxious, or unable to sleep at night because your mind is troubled all the time - these days? Patient unable to answer (P)         Social Connections    In a typical week, how many times do you talk on the phone with family, friends, or neighbors? More than three times a week (P)      How often do you get together with friends or relatives? More than three times a week (P)      How often do you attend Baptist or Scientologist services? Never (P)      Do you belong to any clubs or organizations such as Baptist groups, unions, fraternal or athletic groups, or school groups? No (P)      How often do you attend meetings of the clubs or organizations you belong to? Never (P)      Are you , , , , never , or living with a partner? Never  (P)         Alcohol Use    Q1: How often do you have a drink containing alcohol? Never (P)      Q2: How many drinks containing alcohol do you have on a typical day when you are drinking? Patient does not drink (P)      Q3: How often do you have six or more drinks on one occasion? Never (P)

## 2024-03-09 NOTE — PROGRESS NOTES
Nurses Note -- 4 Eyes      3/9/2024   12:54 AM      Skin assessed during: Admit      [] No Altered Skin Integrity Present    []Prevention Measures Documented      [x] Yes- Altered Skin Integrity Present or Discovered   [x] LDA Added if Not in Epic (Describe Wound)   [] New Altered Skin Integrity was Present on Admit and Documented in LDA   [x] Wound Image Taken    Wound Care Consulted? Yes    Attending Nurse:  Mala Eastman RN/Staff Member:   ELISABETH Jo         Right mons pubis    Right groin w/penrose drain    Right buttock penrose

## 2024-03-09 NOTE — RESPIRATORY THERAPY
"RAPID RESPONSE RESPIRATORY THERAPY PROACTIVE NOTE           Time of visit: 1005     Code Status: Full Code   : 1970  Bed: 46876/77734 A:   MRN: [id number]  Time spent at the bedside: < 15 min    SITUATION    Evaluated patient for: LDA Check     BACKGROUND    Patient has no past medical history on file.  Clinically Significant Surgical Hx: tracheostomy    24 Hours Vitals Range:  Temp:  [97.6 °F (36.4 °C)-99.3 °F (37.4 °C)]   Pulse:  []   Resp:  [16-26]   BP: (120-167)/(60-83)   SpO2:  [97 %-100 %]     Labs:    Recent Labs     24  1600 24  0403 24  0340    146* 137   K 5.1 5.0 5.2*    111* 103   CO2 20* 21* 21*   * 130* 96*   CREATININE 2.6* 3.0* 3.2*   * 211* 194*   PHOS 6.1* 6.6* 4.4   MG 2.3 2.3 2.0        No results for input(s): "PH", "PCO2", "PO2", "HCO3", "POCSATURATED", "BE" in the last 72 hours.    ASSESSMENT/INTERVENTIONS  Pt on 5L 28% trach collar. Shiley size 8 cuffed trach in place. All supplies in room. Extra trachs at bedside - 6 and 8, both cuffed.       Last VS   Temp: 98.4 °F (36.9 °C) (1138)  Pulse: 93 (1138)  Resp: 19 (1138)  BP: 135/69 (1138)  SpO2: 100 % (1138)      Extra trachs at bedside: 6 and 8, both cuffed.  Level of Consciousness: Level of Consciousness (AVPU): alert  Respiratory Effort: Respiratory Effort: Unlabored Expansion/Accessory Muscle Usage: Expansion/Accessory Muscles/Retractions: no use of accessory muscles  All Lung Field Breath Sounds: All Lung Fields Breath Sounds: Anterior:, Lateral:, diminished, clear  JOSE Breath Sounds: coarse  RUL Breath Sounds: coarse  O2 Device/Concentration: 5L 28% TC.  Surgical airway: Yes, Type: Shiley Size: 8, cuffed  Ambu at bedside:       Active Orders   Respiratory Care    Inhalation Treatment Q4H PRN     Frequency: Q4H PRN     Number of Occurrences: Until Specified    Oxygen Continuous     Frequency: Continuous     Number of Occurrences: Until Specified     Order " Questions:      Device type: Low flow      Device: Trach Collar      FiO2%: 40      Titrate O2 per Oxygen Titration Protocol: Yes      To maintain SpO2 goal of: >= 90%      Notify MD of: Inability to achieve desired SpO2; Sudden change in patient status and requires 20% increase in FiO2; Patient requires >60% FiO2    Pulse Oximetry Continuous     Frequency: Continuous     Number of Occurrences: Until Specified    Routine tracheostomy care     Frequency: BID     Number of Occurrences: Until Specified    SUCTION Q4H     Frequency: Q4H     Number of Occurrences: Until Specified       RECOMMENDATIONS    We recommend: RRT Recs: Continue POC per primary team.      FOLLOW-UP    Please call back the Rapid Response RT, Javier June RRT at x [phone] for any questions or concerns.

## 2024-03-09 NOTE — ASSESSMENT & PLAN NOTE
Ms. Saravia is a 54yo female well known to the surgical service.  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  After a prolonged hospital course she was discharged to LTAC on 3/7 however is now representing on 3/8 with fever, pyuria, and drainage from her posterior wound. On 3/8 this was opened and is draining adequately. Now groin incision growing pseudomonas.      - no surgical intervention currently  - will need replacement of rectal tube  - Infectious disease consult given her history of infections and to help with tailoring of antibiotics  - Rest of care per primary team

## 2024-03-09 NOTE — SUBJECTIVE & OBJECTIVE
Interval Hx: NAEON, VSS, HD yesterday    No past medical history on file.    Past Surgical History:   Procedure Laterality Date    CLOSURE OF WOUND Right 2/22/2024    Procedure: CLOSURE, WOUND;  Surgeon: Steve Cole MD;  Location: [facility] OR [address] [place] [address];  Service: General;  Laterality: Right;    ESOPHAGOGASTRODUODENOSCOPY W/ PEG N/A 2/22/2024    Procedure: EGD, WITH PEG TUBE INSERTION;  Surgeon: Steve Cole MD;  [facility];  Service: General;  Laterality: N/A;    INCISION AND DRAINAGE OF PERIRECTAL REGION N/A 1/29/2024    Procedure: INCISION AND DRAINAGE, PERIRECTAL REGION;  Surgeon: Axel Ramsay MD;  Location: [facility] OR;  Service: General;  Laterality: N/A;    LUMBAR PUNCTURE N/A 2/16/2024    Procedure: Lumbar Puncture;  Surgeon: Macy Boykin;  Location: [facility];  Service: Anesthesiology;  Laterality: N/A;    PLACEMENT, TRIALYSIS CATH Right 1/31/2024    Procedure: INSERTION, CATHETER, TRIPLE LUMEN, HEMODIALYSIS, TEMPORARY;  Surgeon: Alvin Junior MD;  Location: [facility] OR;  Service: General;  Laterality: Right;    REPLACEMENT OF WOUND VACUUM-ASSISTED CLOSURE DEVICE Right 2/12/2024    Procedure: REPLACEMENT, WOUND VAC;  Surgeon: Mundo Carmona MD;  [facility];  Service: General;  Laterality: Right;    REPLACEMENT OF WOUND VACUUM-ASSISTED CLOSURE DEVICE N/A 2/15/2024    Procedure: REPLACEMENT, WOUND VAC;  Surgeon: Steve Cole MD;  [facility];  Service: General;  Laterality: N/A;    REPLACEMENT OF WOUND VACUUM-ASSISTED CLOSURE DEVICE Right 2/19/2024    Procedure: REPLACEMENT, WOUND VAC;  Surgeon: Steve Cole MD;  [facility];  Service: General;  Laterality: Right;  RLE/groin    REPLACEMENT OF WOUND VACUUM-ASSISTED CLOSURE DEVICE Right 2/22/2024    Procedure: REPLACEMENT, WOUND VAC;  Surgeon: Steve Cole MD;  [facility];  Service: General;  Laterality: Right;    TRACHEOSTOMY N/A 2/22/2024    Procedure: CREATION, TRACHEOSTOMY;   Surgeon: Steve Cole MD;  [facility];  Service: General;  Laterality: N/A;    WOUND DEBRIDEMENT Bilateral 2/2/2024    Procedure: DEBRIDEMENT, WOUND;  Surgeon: Steve Cole MD;  [facility];  Service: General;  Laterality: Bilateral;  Bilateral groin  Possible wound vac placement    WOUND DEBRIDEMENT Right 2/6/2024    Procedure: DEBRIDEMENT, WOUND, replace wound vac, possible closure;  Surgeon: Steve Cole MD;  [facility];  Service: General;  Laterality: Right;  RLE    WOUND DEBRIDEMENT Right 2/9/2024    Procedure: DEBRIDEMENT, WOUND w wound vac change;  Surgeon: Steve Cole MD;  Location: [facility] OR [address] [place] [address];  Service: General;  Laterality: Right;  RLE    WOUND DEBRIDEMENT Right 2/12/2024    Procedure: R thigh wound debridement;  Surgeon: Mundo Carmona MD;  Location: [facility] OR [address] [place] [address];  Service: General;  Laterality: Right;    WOUND EXPLORATION Right 1/31/2024    Procedure: IRRIGATION & DEBRIDEMENT, WOUND DEBRIDEMENT;  Surgeon: Alvin Junior MD;  Location: [facility];  Service: General;  Laterality: Right;       Review of patient's allergies indicates:  No Known Allergies    No current facility-administered medications on file prior to encounter.     No current outpatient medications on file prior to encounter.     Family History    None       Tobacco Use    Smoking status: Unknown    Smokeless tobacco: Not on file   Substance and Sexual Activity    Alcohol use: Not on file    Drug use: Not on file    Sexual activity: Not on file     Review of Systems   Unable to perform ROS: Acuity of condition     Objective:     Vital Signs (Most Recent):  Temp: 98.4 °F (36.9 °C) (03/09/24 1138)  Pulse: 93 (03/09/24 1138)  Resp: 19 (03/09/24 1138)  BP: 135/69 (03/09/24 1138)  SpO2: 100 % (03/09/24 1138) Vital Signs (24h Range):  Temp:  [97.6 °F (36.4 °C)-99.3 °F (37.4 °C)] 98.4 °F (36.9 °C)  Pulse:  [] 93  Resp:  [16-26] 19  SpO2:  [97 %-100 %] 100 %  BP:  (120-167)/(60-83) 135/69     Weight: (!) 140.7 kg (310 lb 3 oz)  Body mass index is 47.16 kg/m².     Physical Exam  Vitals and nursing note reviewed.   Constitutional:       General: She is not in acute distress.     Appearance: She is obese. She is ill-appearing and toxic-appearing.      Comments: Unable to respond or follow commands   HENT:      Head: Atraumatic.   Neck:      Comments: Tracheostomy in place    Cardiovascular:      Rate and Rhythm: Normal rate and regular rhythm.      Pulses: Normal pulses.      Heart sounds: Normal heart sounds.   Pulmonary:      Effort: Pulmonary effort is normal.      Breath sounds: Normal breath sounds.   Abdominal:      General: Abdomen is flat.      Palpations: Abdomen is soft.   Musculoskeletal:         General: Normal range of motion.   Skin:     Comments: Skin assessment - No areas of ulceration or blistering of bony prominences.    Neurological:      General: No focal deficit present.      Mental Status: She is oriented to person, place, and time.   Psychiatric:         Mood and Affect: Mood normal.         Behavior: Behavior normal.                Significant Labs: All pertinent labs within the past 24 hours have been reviewed.    Significant Imaging: I have reviewed all pertinent imaging results/findings within the past 24 hours.

## 2024-03-09 NOTE — HOSPITAL COURSE
Presenting from LTAC w/ low grade fevers and increased drainage from wounds. Gen surg consulted; s/p bedside debridement. On Dapto/joyce. ID and nephro consulted. HD initiated. Groin abscess w/ pseudomonas sensitive to meropenem. Respiratory culture growing pseudomonas sensitive to meropenem and urine culture w/ GNR pending speciation and sensitivity. Dapto discontinued. Resolution of sepsis physiology and improvement in mental status to baseline. Will need meropenem through 3/21 per ID. Medically stable to return to LTAC. Plan for transfer to [facility] on evening of 3/12.

## 2024-03-09 NOTE — ASSESSMENT & PLAN NOTE
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 Prior cultures w/ proteus mirabilis and bacteroides. She was treated with broad spectrum antibiotics. 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 TTE without evidence of infective endocarditis. Patient completed antibiotics 2/22. Patient was admitted to [facility] for wound care, therapy, respiratory and trach management. ID consulted today to due low grade fevers.      Glynn exchanged. UA w/ pyuria - cx with multiple organisms none in predominance. Blood cx - NGTD.     CT A/P:    1.Open wound of the right mons pubis with surrounding subcutaneous stranding and some trace subcutaneous gas.  Infection is favored.  Necrotizing infection not excluded.  Underlying neoplasm not excluded.  Correlate clinically.   2. Possible cystitis.  Correlate clinically.  No CT evidence of upper urinary tract infection at this time.   3. Radiopaque material in the intergluteal cleft possibly represents contrast evacuated from the rectum.  Correlate clinically for possible rectal incontinence.   4. Bibasilar pulmonary opacities most likely represent subsegmental atelectasis and scarring.  Pneumonia or other pathology not excluded.  Correlate clinically; consider follow-up chest CT in 3-6 months to confirm resolution and help further exclude neoplasm.     Wound cx with GNR.  Afebrile and WBC 14.11. CPK WNL        Recommendations  Continue empiric IV Meropenem and Daptomycin.  Follow wound cultures   Rec GOC discussion  Ntrn consult and wound care consult  Discussed plan with ID staff.. ID will follow.

## 2024-03-09 NOTE — PLAN OF CARE
Nephrology Chart Review      Intake/Output Summary (Last 24 hours) at 3/9/2024 0737  Last data filed at 3/9/2024 0538  Gross per 24 hour   Intake 1600 ml   Output 1050 ml   Net 550 ml       Vitals:    03/09/24 0332 03/09/24 0400 03/09/24 0424 03/09/24 0731   BP:  128/67  129/60   BP Location:  Left arm  Right arm   Patient Position:  Lying  Lying   Pulse: 95 99  93   Resp:  18  20   Temp:  98.9 °F (37.2 °C)  97.6 °F (36.4 °C)   TempSrc:  Oral  Axillary   SpO2:  98% 99% 97%   Weight:  (!) 140.7 kg (310 lb 3 oz)     Height:           Recent Labs   Lab 03/07/24  1600 03/08/24  0403 03/09/24  0340    146* 137   K 5.1 5.0 5.2*    111* 103   CO2 20* 21* 21*   * 130* 96*   CREATININE 2.6* 3.0* 3.2*   CALCIUM 10.6* 10.2 9.8   PHOS 6.1* 6.6* 4.4     A1c - 01/30/2024 10.4     Patients' chart and laboratory studies reviewed. Underwent iHD overnight for metabolic clearance. Likely plan for break over weekend from RRT unless acute indications arise.  No other related issues identified. Please call nephrology as needed. We will continue to follow.     Wilian Cat MD  Nephrology

## 2024-03-09 NOTE — CONSULTS
[facility] - Stepdown Flex ([place]-14)  Infectious Disease  Consult Note    Patient Name: Sarah Saravia  MRN: [id number]  Admission Date: 3/7/2024  Hospital Length of Stay: 0 days  Attending Physician: Janett Jean MD  [facility]     Isolation Status: Contact    Patient information was obtained from patient and ER records.      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 Prior cultures w/ proteus mirabilis and bacteroides. She was treated with broad spectrum antibiotics. 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 TTE without evidence of infective endocarditis. Patient completed antibiotics 2/22. Patient was admitted to [facility] for wound care, therapy, respiratory and trach management. ID consulted today to due low grade fevers.      Glynn exchanged. UA w/ pyuria - cx with multiple organisms none in predominance. Blood cx - NGTD.     CT A/P:    1.Open wound of the right mons pubis with surrounding subcutaneous stranding and some trace subcutaneous gas.  Infection is favored.  Necrotizing infection not excluded.  Underlying neoplasm not excluded.  Correlate clinically.   2. Possible cystitis.  Correlate clinically.  No CT evidence of upper urinary tract infection at this time.   3. Radiopaque material in the intergluteal cleft possibly represents contrast evacuated from the rectum.  Correlate clinically for possible rectal incontinence.   4. Bibasilar pulmonary opacities most likely represent subsegmental  atelectasis and scarring.  Pneumonia or other pathology not excluded.  Correlate clinically; consider follow-up chest CT in 3-6 months to confirm resolution and help further exclude neoplasm.     Wound cx with GNR.  Afebrile and WBC 14.11. CPK WNL        Recommendations  Continue empiric IV Meropenem and Daptomycin.  Follow wound cultures   Rec GOC discussion  Ntrn consult and wound care consult  Discussed plan with ID staff.. ID will follow.           Thank you for your consult. I will follow-up with patient. Please contact us if you have any additional questions.    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 Cultures w/ proteus mirabilis and bacteroides. She was treated with broad spectrum antibiotics. 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 TTE without evidence of infective endocarditis.Thehe patients hospital course was further complicated by fever and leukocytosis prompting primary service for performed urine studies. 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 Patient completed antibiotics 2/22. Patient was admitted to [facility] for  wound care, PT/OT/ST, respiratory and trach management. ID consulted today to due low grade fevers at [facility] and found to  have ongoing purulence from wound.  Patient transferred back to [facility] eval.    Patient has been placed on Daptomycin and meropenem.  Tmax 100.3 and WBC 14s.  Blood cultures NGTD.  Gen Sx has eval'd patient and rec CT ABD/Pelvis, possible penrose drain removal and wound opening with packing (done 3/8), as well ad ID work up.  CT ABD/Pelvis:  1. Open wound of the right mons pubis with surrounding subcutaneous stranding and some trace subcutaneous gas.  Infection is favored.  Necrotizing infection not excluded.  Underlying neoplasm not excluded.  Correlate clinically.   2. Possible cystitis.  Correlate clinically.  No CT evidence of upper urinary tract infection at this time.   3. Radiopaque material in the intergluteal cleft possibly represents contrast evacuated from the rectum.  Correlate clinically for possible rectal incontinence.   4. Bibasilar pulmonary opacities most likely represent subsegmental atelectasis and scarring.  Pneumonia or other pathology not excluded.     CXR: lungs clear, no acute process.     Wound cultures have been sent.  Catheterized urine - hazy with WBC 28. ID now consulted for abx recs.        No past medical history on file.    Past Surgical History:   Procedure Laterality Date    CLOSURE OF WOUND Right 2/22/2024    Procedure: CLOSURE, WOUND;  Surgeon: Steve Cole MD;  Location: [address];  Service: General;  Laterality: Right;    ESOPHAGOGASTRODUODENOSCOPY W/ PEG N/A 2/22/2024    Procedure: EGD, WITH PEG TUBE INSERTION;  Surgeon: Steve Cole MD;  Location: [address];  Service: General;  Laterality: N/A;    INCISION AND DRAINAGE OF PERIRECTAL REGION N/A 1/29/2024    Procedure: INCISION AND DRAINAGE, PERIRECTAL REGION;  Surgeon: Axel Ramsay MD;  Location: [facility] OR;  Service: General;  Laterality: N/A;    LUMBAR PUNCTURE N/A 2/16/2024    Procedure: Lumbar Puncture;  Surgeon: Macy Boykin;  Location: [facility];  Service: Anesthesiology;  Laterality: N/A;     PLACEMENT, TRIALYSIS CATH Right 1/31/2024    Procedure: INSERTION, CATHETER, TRIPLE LUMEN, HEMODIALYSIS, TEMPORARY;  Surgeon: Alvin Junior MD;  Location: [facility] OR;  Service: General;  Laterality: Right;    REPLACEMENT OF WOUND VACUUM-ASSISTED CLOSURE DEVICE Right 2/12/2024    Procedure: REPLACEMENT, WOUND VAC;  Surgeon: Mundo Carmona MD;  Location: [facility] OR [facility] F[facility];  Service: General;  Laterality: Right;    REPLACEMENT OF WOUND VACUUM-ASSISTED CLOSURE DEVICE N/A 2/15/2024    Procedure: REPLACEMENT, WOUND VAC;  Surgeon: Steve Cole MD;  Location: [facility] OR [facility] FLR;  Service: General;  Laterality: N/A;    REPLACEMENT OF WOUND VACUUM-ASSISTED CLOSURE DEVICE Right 2/19/2024    Procedure: REPLACEMENT, WOUND VAC;  Surgeon: Steve Cole MD;  [facility];  Service: General;  Laterality: Right;  RLE/groin    REPLACEMENT OF WOUND VACUUM-ASSISTED CLOSURE DEVICE Right 2/22/2024    Procedure: REPLACEMENT, WOUND VAC;  Surgeon: Steve Cole MD;  [facility];  Service: General;  Laterality: Right;    TRACHEOSTOMY N/A 2/22/2024    Procedure: CREATION, TRACHEOSTOMY;  Surgeon: Steve Cole MD;  [facility];  Service: General;  Laterality: N/A;    WOUND DEBRIDEMENT Bilateral 2/2/2024    Procedure: DEBRIDEMENT, WOUND;  Surgeon: Steve Cole MD;  [facility];  Service: General;  Laterality: Bilateral;  Bilateral groin  Possible wound vac placement    WOUND DEBRIDEMENT Right 2/6/2024    Procedure: DEBRIDEMENT, WOUND, replace wound vac, possible closure;  Surgeon: Steve Cole MD;  Location: [facility];  Service: General;  Laterality: Right;  RLE    WOUND DEBRIDEMENT Right 2/9/2024    Procedure: DEBRIDEMENT, WOUND w wound vac change;  Surgeon: Steve Cole MD;  [facility]: [facility];  Service: General;  Laterality: Right;  RLE    WOUND DEBRIDEMENT Right 2/12/2024    Procedure: R thigh wound debridement;  Surgeon: Mundo Carmona MD;  Location: [facility]  2ND FLR;  Service: General;  Laterality: Right;    WOUND EXPLORATION Right 1/31/2024    Procedure: IRRIGATION & DEBRIDEMENT, WOUND DEBRIDEMENT;  Surgeon: Alvin Junior MD;  Location: [facility];  Service: General;  Laterality: Right;       Review of patient's allergies indicates:  No Known Allergies    Medications:  (Not in a hospital admission)    Antibiotics (From admission, onward)      Start     Stop Route Frequency Ordered    03/08/24 1900  DAPTOmycin (CUBICIN) 1,040 mg in sodium chloride 0.9% SolP 50 mL IVPB         -- IV Every 24 hours (non-standard times) 03/08/24 0334    03/08/24 0700  meropenem (MERREM) 2 g in sodium chloride 0.9% 100 mL IVPB         -- IV Every 12 hours (non-standard times) 03/08/24 0334          Antifungals (From admission, onward)      None          Antivirals (From admission, onward)      None             Immunization History   Administered Date(s) Administered    COVID-19, MRNA, LN-S, PF (Pfizer) (Purple Cap) 05/24/2021, 06/14/2021    PPD Test 08/16/2022       Family History    None       Social History     Socioeconomic History    Marital status: Single     Social Determinants of Health     Financial Resource Strain: Patient Unable To Answer (3/7/2024)    Overall Financial Resource Strain (CARDIA)     Difficulty of Paying Living Expenses: Patient unable to answer   Food Insecurity: Patient Unable To Answer (3/7/2024)    Hunger Vital Sign     Worried About Running Out of Food in the Last Year: Patient unable to answer     Ran Out of Food in the Last Year: Patient unable to answer   Transportation Needs: Patient Unable To Answer (3/7/2024)    PRAPARE - Transportation     Lack of Transportation (Medical): Patient unable to answer     Lack of Transportation (Non-Medical): Patient unable to answer   Stress: Patient Unable To Answer (3/7/2024)    [employer] - Occupational Stress Questionnaire     Feeling of Stress : Patient unable to answer   Housing  Stability: Patient Unable To Answer (3/7/2024)    Housing Stability Vital Sign     Unable to Pay for Housing in the Last Year: Patient unable to answer     Unstable Housing in the Last Year: Patient unable to answer     Review of Systems   Unable to perform ROS: Intubated (trache)     Objective:     Vital Signs (Most Recent):  Temp: 98.8 °F (37.1 °C) (03/08/24 0502)  Pulse: 94 (03/08/24 0734)  Resp: 20 (03/08/24 0734)  BP: (!) 158/69 (03/08/24 0734)  SpO2: 100 % (03/08/24 0734) Vital Signs (24h Range):  Temp:  [98.2 °F (36.8 °C)-100.3 °F (37.9 °C)] 98.8 °F (37.1 °C)  Pulse:  [] 94  Resp:  [15-33] 20  SpO2:  [92 %-100 %] 100 %  BP: (125-180)/(61-82) 158/69     Weight: 129.7 kg (286 lb)  Body mass index is 43.49 kg/m².    Estimated Creatinine Clearance: 30.9 mL/min (A) (based on SCr of 3 mg/dL (H)).     Physical Exam  Constitutional:       General: She is not in acute distress.     Appearance: She is well-developed. She is obese. She is ill-appearing. She is not toxic-appearing or diaphoretic.       HENT:      Head: Normocephalic and atraumatic.   Cardiovascular:      Rate and Rhythm: Normal rate and regular rhythm.      Heart sounds: Normal heart sounds. No murmur heard.     No friction rub. No gallop.   Pulmonary:      Effort: Pulmonary effort is normal. No respiratory distress.      Breath sounds: Normal breath sounds. No wheezing or rales.   Abdominal:      General: Bowel sounds are normal. There is no distension.      Palpations: Abdomen is soft. There is no mass.      Tenderness: There is no abdominal tenderness. There is no guarding or rebound.   Skin:     General: Skin is warm and dry.   Neurological:      Mental Status: She is alert.      Comments: Moves spontaneously but does not FC            Significant Labs: Blood Culture:   Recent Labs   Lab 01/29/24  2118 03/07/24  1215 03/07/24  1230 03/07/24  1600 03/07/24  1601   [facility] No Growth after 4 days. No Growth to date No Growth to date No Growth to  date No Growth to date     CBC:   Recent Labs   Lab 03/07/24  1600 03/08/24  0403   WBC 14.18* 14.11*   HGB 8.9* 8.8*   HCT 29.0* 28.9*   * 489*     CMP:   Recent Labs   Lab 03/07/24  0356 03/07/24  1600 03/08/24  0403    144 146*   K 4.9 5.1 5.0    109 111*   CO2 20* 20* 21*   * 218* 211*   * 124* 130*   CREATININE 2.6* 2.6* 3.0*   CALCIUM 10.2 10.6* 10.2   PROT  --  9.2* 8.9*   ALBUMIN 2.7* 2.9* 2.8*   BILITOT  --  0.2 0.3   ALKPHOS  --  187* 162*   AST  --  36 29   ALT  --  36 35   ANIONGAP 14 15 14     Wound Culture:   Recent Labs   Lab 01/30/24  0219 01/30/24  0228 02/19/24  0820   LABAERO PROTEUS MIRABILIS  Moderate  * PROTEUS MIRABILIS  Moderate  * No growth     All pertinent labs within the past 24 hours have been reviewed.    Significant Imaging: I have reviewed all pertinent imaging results/findings within the past 24 hours.  Procedure Component Value Units Date/Time   CT Abdomen Pelvis With IV Contrast NO Oral Contrast [[id number]] (Abnormal) Resulted: 03/07/24 2316   Order Status: Completed Updated: 03/07/24 2318   Narrative:     EXAMINATION:  CT ABDOMEN PELVIS WITH IV CONTRAST    CLINICAL HISTORY:  Ayaz's gangrene;    TECHNIQUE:  Low dose axial images were obtained from the lung bases to the pubic symphysis following the intravenous administration of 100cc of Omnipaque 350.  Sagittal and coronal reformats were provided.    COMPARISON:  Chest radiograph 03/07/2024, 03/05/2024    Radiograph tube check 02/02/2024    Retroperitoneal ultrasound    CT abdomen pelvis 01/29/2024    FINDINGS:  Abdomen CT and pelvis CT:    Artifacts related to motion and/or beam hardening degrade numerous images.    Heart: Partially visualized central venous catheter terminating in the right atrium.  Prominent cardiac silhouette.  Visualized portions of the pericardium demonstrate no discrete fluid collections.    Lung bases/airways: Evaluation of the lung parenchyma limited due to respiratory  motion.  Bilateral lower lobe pulmonary opacities, predominantly linear and bandlike, most likely represent atelectasis.  No consolidation, pleural effusion, discrete pulmonary or pleural mass, or pneumothorax apparent in the visualized lower chest.    Liver: Enlarged measuring 23 cm in craniocaudal dimension.  No discrete hepatic mass apparent on these portal venous phase images.    Gallbladder: Normal in appearance without evidence for cholecystitis.    Bile Ducts: No intra or extrahepatic biliary ductal dilation.    Pancreas: No pancreatic mass lesion or peripancreatic inflammatory change.    Spleen: Unremarkable.   1.4 cm accessory splenule.    Adrenals: Unremarkable.    Kidneys/ Ureters: Kidneys are normal in size and enhance symmetrically.  No nephrolithiasis or hydroureteronephrosis.    Bladder: Bladder is decompressed around a Glynn catheter.  Allowing for this, some bladder wall thickening is nevertheless suggested.  There is also some faint pericystic stranding.    Reproductive organs: Unremarkable.    GI Tract/Mesentery: Percutaneous gastric tube in the gastric body.  Visualized loops of small and large bowel are normal in caliber without evidence for obstruction or inflammation.   Enteric contrast throughout the colon.  Normal appendix.    No abdominopelvic ascites, intraperitoneal free air.    Enlarged external iliac chain lymph nodes, right greater than left measuring up to 0.8 cm in short axis.    Abdominal wall/extraperitoneal soft tissues: Numerous anterior abdominal wall foci of subcutaneous stranding may represent sites of prior subcutaneous injection.    Small fat-containing inguinal hernia.    Skin and subcutaneous soft tissue defect, likely an open wound, with adjacent fat stranding, in the right mons pubis.  The subcutaneous fat stranding extends superolaterally approximately 10.0 cm.  There are few foci of subcutaneous emphysema (series 2 image 203).  This could represent a necrotizing soft  tissue infection.    Vasculature: Abdominal aorta is normal in caliber, contour, and course without significant calcific atherosclerosis.    Portal veins, SMV and splenic vein are patent.    Bones: No acute displaced fracture.   Impression:       Abdomen CT and Pelvis CT:    1. Open wound of the right mons pubis with surrounding subcutaneous stranding and some trace subcutaneous gas.  Infection is favored.  Necrotizing infection not excluded.  Underlying neoplasm not excluded.  Correlate clinically.  2. Possible cystitis.  Correlate clinically.  No CT evidence of upper urinary tract infection at this time.  3. Radiopaque material in the intergluteal cleft possibly represents contrast evacuated from the rectum.  Correlate clinically for possible rectal incontinence.  4. Bibasilar pulmonary opacities most likely represent subsegmental atelectasis and scarring.  Pneumonia or other pathology not excluded.  Correlate clinically; consider follow-up chest CT in 3-6 months to confirm resolution and help further exclude neoplasm.  5. Additional observations as detailed in the body of the report.  This report was flagged in Epic as abnormal.    Electronically signed by resident: Judy Deng  Date: 03/07/2024  Time: 21:16    Electronically signed by: Taras Miller  Date: 03/07/2024  Time: 23:16   X-Ray Chest AP Portable [[id number]] Resulted: 03/07/24 1556   Order Status: Completed Updated: 03/07/24 1558   Narrative:     EXAMINATION:  XR CHEST AP PORTABLE    CLINICAL HISTORY:  Sepsis;    TECHNIQUE:  Single frontal view of the chest was performed.    COMPARISON:  03/05/2024    FINDINGS:  Tracheostomy tube.  Right central line distal tip in the SVC.    The cardiac silhouette is normal in size.  The pulmonary vascularity is normal.    The lungs are clear.  No pleural effusion, no pneumothorax seen.    The osseous structures demonstrate no acute findings.   Impression:       No definite acute intrathoracic process  seen.      Electronically signed by: Cami Jeffrey MD  Date: 03/07/2024  Time: 15:56     Imaging History    2024    Date Procedure Name Study Review Link PACS Link Status Accession Number Location   03/07/24 08:59 PM CT Abdomen Pelvis With IV Contrast NO Oral Contrast Study Review  Images Final [id number] [facility]   03/07/24 03:16 PM X-Ray Chest AP Portable Study Review  Images Final [id number] [facility]   03/05/24 09:03 PM X-Ray Chest AP Portable Study Review  Images Final [id number] [facility]   02/29/24 03:50 PM IR Tunneled Catheter Insert w/o Port Study Review  Images Final [id number] [facility]   02/18/24 07:27 AM X-Ray Chest AP Portable Study Review  Images Final [id number] [facility]   02/17/24 08:09 AM X-Ray Chest AP Portable Study Review  Images Final [id number] [facility]   02/16/24 04:15 PM FL Lumbar Puncture (xpd) Study Review  Images Final [id number] [facility]   02/15/24 06:18 AM X-Ray Chest AP Portable Study Review  Images Final [id number] [facility]   02/13/24 09:50 PM X-Ray Chest AP Portable Study Review  Images Final [id number] [facility]   02/12/24 06:15 AM X-Ray Chest AP Portable Study Review  Images Final [id number] [facility]   02/12/24 12:22 AM X-Ray Chest AP Portable Study Review  Images Final [id number] [facility]   02/11/24 04:25 AM X-Ray Chest AP Portable Study Review  Images Final [id number] [facility]   02/10/24 12:53 PM MRV Brain Without Contrast Study Review  Images Final [id number] [facility]   02/10/24 12:49 PM MRA Brain without contrast Study Review  Images Final [id number] [facility]   02/10/24 12:47 PM MRI Brain Without Contrast Study Review  Images Final [id number] [facility]   02/10/24 05:07 AM X-Ray Chest AP Portable Study Review  Images Final [id number] [facility]   02/09/24 06:35 PM XR NG/OG tube placement check, non-radiologist performed Study Review  Images Final [id number] [facility]   02/07/24 11:39 PM CT Head Without Contrast Study Review  Images Final [id number] [facility]   02/07/24 04:07 PM CT Head Without Contrast Study Review  Images Final [id number] [facility]   02/07/24 05:41 AM X-Ray  Chest AP Portable Study Review  Images Final [id number] [facility]   02/06/24 03:52 PM CTA Head and Neck (xpd) Study Review  Images Final [id number] [facility]   02/06/24 02:18 PM X-Ray Chest AP Portable Study Review  Images Final [id number] [facility]   02/06/24 12:41 PM X-Ray Chest AP Portable Study Review  Images Final [id number] [facility]   02/06/24 09:30 AM MRI Brain Without Contrast Study Review  Images Final [id number] [facility]   03/05/24 07:43 AM CARDIAC MONITORING STRIPS Study Review  Final     03/04/24 09:55 PM CARDIAC MONITORING STRIPS Study Review  Final     03/04/24 07:26 AM CARDIAC MONITORING STRIPS Study Review  Final     03/04/24 03:02 AM CARDIAC MONITORING STRIPS Study Review  Final     03/03/24 07:58 AM CARDIAC MONITORING STRIPS Study Review  Final     03/03/24 04:27 AM CARDIAC MONITORING STRIPS Study Review  Final     03/02/24 04:42 AM CARDIAC MONITORING STRIPS Study Review  Final     03/01/24 05:22 PM CARDIAC MONITORING STRIPS Study Review  Final     03/01/24 03:26 AM CARDIAC MONITORING STRIPS Study Review  Final     02/29/24 04:12 PM CARDIAC MONITORING STRIPS Study Review  Final     02/29/24 07:35 AM CARDIAC MONITORING STRIPS Study Review  Final     02/29/24 12:06 AM CARDIAC MONITORING STRIPS Study Review  Final     02/27/24 07:38 PM CARDIAC MONITORING STRIPS Study Review  Final     02/27/24 08:04 AM CARDIAC MONITORING STRIPS Study Review  Final     02/26/24 07:46 PM CARDIAC MONITORING STRIPS Study Review  Final     02/25/24 09:47 AM CARDIAC MONITORING STRIPS Study Review  Final     02/24/24 02:41 PM CARDIAC MONITORING STRIPS Study Review  Final     02/23/24 02:38 PM CARDIAC MONITORING STRIPS Study Review  Final     02/22/24 09:32 PM CARDIAC MONITORING STRIPS Study Review  Final     02/22/24 10:42 AM CARDIAC MONITORING STRIPS Study Review  Final     02/21/24 09:39 PM CARDIAC MONITORING STRIPS Study Review  Final     02/21/24 05:49 PM CARDIAC MONITORING STRIPS Study Review  Final     02/20/24 07:15 PM CARDIAC MONITORING STRIPS  Study Review  Final     02/20/24 11:26 AM CARDIAC MONITORING STRIPS Study Review  Final     02/19/24 11:09 PM CARDIAC MONITORING STRIPS Study Review  Final     02/19/24 07:53 AM CARDIAC MONITORING STRIPS Study Review  Final     02/18/24 10:33 PM CARDIAC MONITORING STRIPS Study Review  Final     02/18/24 07:42 AM CARDIAC MONITORING STRIPS Study Review  Final     02/18/24 05:21 AM CARDIAC MONITORING STRIPS Study Review  Final     02/17/24 09:37 AM CARDIAC MONITORING STRIPS Study Review  Final     02/17/24 06:36 AM CARDIAC MONITORING STRIPS Study Review  Final     02/16/24 05:01 PM CARDIAC MONITORING STRIPS Study Review  Final     02/15/24 06:11 PM CARDIAC MONITORING STRIPS Study Review  Final     02/15/24 03:26 AM CARDIAC MONITORING STRIPS Study Review  Final     02/14/24 04:30 PM CARDIAC MONITORING STRIPS Study Review  Final     02/13/24 07:05 PM CARDIAC MONITORING STRIPS Study Review  Final     02/13/24 11:55 AM CARDIAC MONITORING STRIPS Study Review  Final     02/12/24 07:14 PM CARDIAC MONITORING STRIPS Study Review  Final     02/12/24 07:07 AM CARDIAC MONITORING STRIPS Study Review  Final     02/11/24 07:18 PM CARDIAC MONITORING STRIPS Study Review  Final     02/10/24 07:25 PM CARDIAC MONITORING STRIPS Study Review  Final     02/08/24 08:24 PM CARDIAC MONITORING STRIPS Study Review  Final     02/07/24 07:13 AM CARDIAC MONITORING STRIPS Study Review  Final     02/06/24 09:51 PM CARDIAC MONITORING STRIPS Study Review  Final     02/06/24 09:20 AM CARDIAC MONITORING STRIPS Study Review  Final     02/06/24 05:39 AM CARDIAC MONITORING STRIPS Study Review  Final     02/06/24 02:30 PM Echo Study Review  Images Final [id number] SANDRAWALLISON   03/06/24 10:40 AM Intra-Procedure Documentation Study Review  Images Final [id number] LAUREN

## 2024-03-09 NOTE — PROGRESS NOTES
[facility] - Stepdown Flex ([facility]-14)  General Surgery  Progress Note    Subjective:     History of Present Illness:  No notes on file    Post-Op Info:  * No surgery found *         Interval History: NAEON. Pt rectal tube fell out stool within bed. Pt remains non-verbal, non-responsive.    Medications:  Continuous Infusions:  Scheduled Meds:   amLODIPine  10 mg Per G Tube Daily    ascorbic acid (vitamin C)  500 mg Per G Tube BID    carvediloL  25 mg Per G Tube BID    chlorhexidine  15 mL Mouth/Throat BID    DAPTOmycin (CUBICIN) IV (PEDS and ADULTS)  8 mg/kg Intravenous Q24H    heparin (porcine)  7,500 Units Subcutaneous Q8H    insulin aspart U-100  8 Units Subcutaneous Q4H    insulin detemir U-100  14 Units Subcutaneous Daily    meropenem (MERREM) IVPB  2 g Intravenous Q12H    mupirocin   Nasal BID    pantoprazole  40 mg Per G Tube Daily    psyllium husk (aspartame)  1 packet Per G Tube Daily    sevelamer carbonate  1.6 g Per G Tube TID    zinc sulfate  220 mg Per G Tube Daily     PRN Meds:acetaminophen, albuterol-ipratropium, dextrose 10%, dextrose 10%, glucagon (human recombinant), heparin (porcine), hydrALAZINE, insulin aspart U-100, labetalol, naloxone, ondansetron, prochlorperazine, sodium chloride 0.9%     Review of patient's allergies indicates:  No Known Allergies  Objective:     Vital Signs (Most Recent):  Temp: 97.6 °F (36.4 °C) (03/09/24 0731)  Pulse: 93 (03/09/24 0731)  Resp: 20 (03/09/24 0731)  BP: 129/60 (03/09/24 0731)  SpO2: 97 % (03/09/24 0731) Vital Signs (24h Range):  Temp:  [97.6 °F (36.4 °C)-99.3 °F (37.4 °C)] 97.6 °F (36.4 °C)  Pulse:  [] 93  Resp:  [16-26] 20  SpO2:  [97 %-100 %] 97 %  BP: (120-167)/(60-83) 129/60     Weight: (!) 140.7 kg (310 lb 3 oz)  Body mass index is 47.16 kg/m².    Intake/Output - Last 3 Shifts         03/07 0700  03/08 0659 03/08 0700 03/09 0659 03/09 0700  03/10 0659    NG/GT  1450     IV Piggyback 142.2 150     Total Intake(mL/kg) 142.2 (1.1) 1600 (11.4)      Urine (mL/kg/hr) 400 350 (0.1)     Other  550     Stool  150     Total Output 400 1050     Net -257.8 +550                     Physical Exam  Constitutional:       Comments: Not following commands or responding   HENT:      Head: Normocephalic.   Cardiovascular:      Rate and Rhythm: Normal rate and regular rhythm.   Pulmonary:      Effort: Pulmonary effort is normal. No respiratory distress.   Abdominal:      General: There is no distension.   Musculoskeletal:      Comments: Occassionally moves upper extremities nonpurposefully   Skin:     General: Skin is warm and dry.      Comments: Posterior wound with drainage   Neurological:      Comments: Unresponsive, unchanged          Significant Labs:  I have reviewed all pertinent lab results within the past 24 hours.    Significant Diagnostics:  I have reviewed all pertinent imaging results/findings within the past 24 hours.  Assessment/Plan:     * Sepsis  Ms. Saravia is a 52yo female well known to the surgical service.  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  After a prolonged hospital course she was discharged to LTAC on 3/7 however is now representing on 3/8 with fever, pyuria, and drainage from her posterior wound. On 3/8 this was opened and is draining adequately. Now groin incision growing pseudomonas. She continues to have no change in neurological status.     - no surgical intervention currently, discussion of possible gastrointestinal diversion with staff and will f/u timing  - will need replacement of rectal tube  - Infectious disease consult given her history of infections and to help with tailoring of antibiotics  - Rest of care per primary team        Dayo Salazar MD  General Surgery  [facility] - [facility] ([place]-14)

## 2024-03-10 LAB
ALBUMIN SERPL BCP-MCNC: 2.5 G/DL (ref 3.5–5.2)
ALP SERPL-CCNC: 155 U/L (ref 55–135)
ALT SERPL W/O P-5'-P-CCNC: 23 U/L (ref 10–44)
ANION GAP SERPL CALC-SCNC: 18 MMOL/L (ref 8–16)
AST SERPL-CCNC: 24 U/L (ref 10–40)
BASOPHILS # BLD AUTO: 0.03 K/UL (ref 0–0.2)
BASOPHILS NFR BLD: 0.2 % (ref 0–1.9)
BILIRUB SERPL-MCNC: 0.2 MG/DL (ref 0.1–1)
BUN SERPL-MCNC: 111 MG/DL (ref 6–20)
CALCIUM SERPL-MCNC: 9.6 MG/DL (ref 8.7–10.5)
CHLORIDE SERPL-SCNC: 98 MMOL/L (ref 95–110)
CO2 SERPL-SCNC: 18 MMOL/L (ref 23–29)
CREAT SERPL-MCNC: 4.5 MG/DL (ref 0.5–1.4)
DIFFERENTIAL METHOD BLD: ABNORMAL
EOSINOPHIL # BLD AUTO: 1.2 K/UL (ref 0–0.5)
EOSINOPHIL NFR BLD: 8.3 % (ref 0–8)
ERYTHROCYTE [DISTWIDTH] IN BLOOD BY AUTOMATED COUNT: 16 % (ref 11.5–14.5)
EST. GFR  (NO RACE VARIABLE): 11.1 ML/MIN/1.73 M^2
GLUCOSE SERPL-MCNC: 161 MG/DL (ref 70–110)
HBV SURFACE AB SER-ACNC: <3 MIU/ML
HBV SURFACE AB SER-ACNC: NORMAL M[IU]/ML
HBV SURFACE AG SERPL QL IA: NORMAL
HCT VFR BLD AUTO: 26.8 % (ref 37–48.5)
HGB BLD-MCNC: 8.5 G/DL (ref 12–16)
IMM GRANULOCYTES # BLD AUTO: 0.34 K/UL (ref 0–0.04)
IMM GRANULOCYTES NFR BLD AUTO: 2.3 % (ref 0–0.5)
LYMPHOCYTES # BLD AUTO: 3.7 K/UL (ref 1–4.8)
LYMPHOCYTES NFR BLD: 24.5 % (ref 18–48)
MAGNESIUM SERPL-MCNC: 2 MG/DL (ref 1.6–2.6)
MCH RBC QN AUTO: 28.8 PG (ref 27–31)
MCHC RBC AUTO-ENTMCNC: 31.7 G/DL (ref 32–36)
MCV RBC AUTO: 91 FL (ref 82–98)
MONOCYTES # BLD AUTO: 1.3 K/UL (ref 0.3–1)
MONOCYTES NFR BLD: 8.8 % (ref 4–15)
NEUTROPHILS # BLD AUTO: 8.4 K/UL (ref 1.8–7.7)
NEUTROPHILS NFR BLD: 55.9 % (ref 38–73)
NRBC BLD-RTO: 0 /100 WBC
PHOSPHATE SERPL-MCNC: 4.8 MG/DL (ref 2.7–4.5)
PLATELET # BLD AUTO: 402 K/UL (ref 150–450)
PMV BLD AUTO: 11.1 FL (ref 9.2–12.9)
POCT GLUCOSE: 159 MG/DL (ref 70–110)
POCT GLUCOSE: 176 MG/DL (ref 70–110)
POCT GLUCOSE: 176 MG/DL (ref 70–110)
POCT GLUCOSE: 182 MG/DL (ref 70–110)
POCT GLUCOSE: 198 MG/DL (ref 70–110)
POCT GLUCOSE: 213 MG/DL (ref 70–110)
POTASSIUM SERPL-SCNC: 5.2 MMOL/L (ref 3.5–5.1)
PROT SERPL-MCNC: 8.4 G/DL (ref 6–8.4)
RBC # BLD AUTO: 2.95 M/UL (ref 4–5.4)
SODIUM SERPL-SCNC: 134 MMOL/L (ref 136–145)
WBC # BLD AUTO: 14.92 K/UL (ref 3.9–12.7)

## 2024-03-10 PROCEDURE — 27000207 HC ISOLATION

## 2024-03-10 PROCEDURE — 27000221 HC OXYGEN, UP TO 24 HOURS

## 2024-03-10 PROCEDURE — 94761 N-INVAS EAR/PLS OXIMETRY MLT: CPT

## 2024-03-10 PROCEDURE — 86706 HEP B SURFACE ANTIBODY: CPT | Performed by: STUDENT IN AN ORGANIZED HEALTH CARE EDUCATION/TRAINING PROGRAM

## 2024-03-10 PROCEDURE — 99900035 HC TECH TIME PER 15 MIN (STAT)

## 2024-03-10 PROCEDURE — 25000003 PHARM REV CODE 250

## 2024-03-10 PROCEDURE — 36415 COLL VENOUS BLD VENIPUNCTURE: CPT | Mod: XB | Performed by: STUDENT IN AN ORGANIZED HEALTH CARE EDUCATION/TRAINING PROGRAM

## 2024-03-10 PROCEDURE — 63600175 PHARM REV CODE 636 W HCPCS: Performed by: HOSPITALIST

## 2024-03-10 PROCEDURE — 83735 ASSAY OF MAGNESIUM: CPT

## 2024-03-10 PROCEDURE — 25000003 PHARM REV CODE 250: Performed by: HOSPITALIST

## 2024-03-10 PROCEDURE — 85025 COMPLETE CBC W/AUTO DIFF WBC: CPT

## 2024-03-10 PROCEDURE — 99900026 HC AIRWAY MAINTENANCE (STAT)

## 2024-03-10 PROCEDURE — 63600175 PHARM REV CODE 636 W HCPCS

## 2024-03-10 PROCEDURE — 36415 COLL VENOUS BLD VENIPUNCTURE: CPT

## 2024-03-10 PROCEDURE — 25000242 PHARM REV CODE 250 ALT 637 W/ HCPCS

## 2024-03-10 PROCEDURE — 87340 HEPATITIS B SURFACE AG IA: CPT | Performed by: STUDENT IN AN ORGANIZED HEALTH CARE EDUCATION/TRAINING PROGRAM

## 2024-03-10 PROCEDURE — 84100 ASSAY OF PHOSPHORUS: CPT

## 2024-03-10 PROCEDURE — 80053 COMPREHEN METABOLIC PANEL: CPT

## 2024-03-10 PROCEDURE — 20600001 HC STEP DOWN PRIVATE ROOM

## 2024-03-10 RX ORDER — HEPARIN SODIUM 1000 [USP'U]/ML
1000 INJECTION, SOLUTION INTRAVENOUS; SUBCUTANEOUS
Status: DISPENSED | OUTPATIENT
Start: 2024-03-11 | End: 2024-03-11

## 2024-03-10 RX ORDER — SODIUM CHLORIDE 9 MG/ML
INJECTION, SOLUTION INTRAVENOUS ONCE
Status: DISCONTINUED | OUTPATIENT
Start: 2024-03-11 | End: 2024-03-12 | Stop reason: HOSPADM

## 2024-03-10 RX ADMIN — INSULIN ASPART 1 UNITS: 100 INJECTION, SOLUTION INTRAVENOUS; SUBCUTANEOUS at 05:03

## 2024-03-10 RX ADMIN — PSYLLIUM HUSK 1 PACKET: 3.4 POWDER ORAL at 08:03

## 2024-03-10 RX ADMIN — MUPIROCIN: 20 OINTMENT TOPICAL at 09:03

## 2024-03-10 RX ADMIN — CHLORHEXIDINE GLUCONATE 0.12% ORAL RINSE 15 ML: 1.2 LIQUID ORAL at 08:03

## 2024-03-10 RX ADMIN — CARVEDILOL 25 MG: 25 TABLET, FILM COATED ORAL at 08:03

## 2024-03-10 RX ADMIN — SEVELAMER CARBONATE 1.6 G: 800 POWDER, FOR SUSPENSION ORAL at 03:03

## 2024-03-10 RX ADMIN — MEROPENEM 2 G: 1 INJECTION INTRAVENOUS at 06:03

## 2024-03-10 RX ADMIN — INSULIN ASPART 2 UNITS: 100 INJECTION, SOLUTION INTRAVENOUS; SUBCUTANEOUS at 08:03

## 2024-03-10 RX ADMIN — ZINC SULFATE 220 MG (50 MG) CAPSULE 220 MG: CAPSULE at 08:03

## 2024-03-10 RX ADMIN — HEPARIN SODIUM 7500 UNITS: 5000 INJECTION INTRAVENOUS; SUBCUTANEOUS at 09:03

## 2024-03-10 RX ADMIN — INSULIN ASPART 2 UNITS: 100 INJECTION, SOLUTION INTRAVENOUS; SUBCUTANEOUS at 01:03

## 2024-03-10 RX ADMIN — INSULIN ASPART 4 UNITS: 100 INJECTION, SOLUTION INTRAVENOUS; SUBCUTANEOUS at 05:03

## 2024-03-10 RX ADMIN — INSULIN ASPART 8 UNITS: 100 INJECTION, SOLUTION INTRAVENOUS; SUBCUTANEOUS at 05:03

## 2024-03-10 RX ADMIN — PANTOPRAZOLE SODIUM 40 MG: 40 GRANULE, DELAYED RELEASE ORAL at 08:03

## 2024-03-10 RX ADMIN — Medication 500 MG: at 09:03

## 2024-03-10 RX ADMIN — HEPARIN SODIUM 7500 UNITS: 5000 INJECTION INTRAVENOUS; SUBCUTANEOUS at 01:03

## 2024-03-10 RX ADMIN — INSULIN ASPART 1 UNITS: 100 INJECTION, SOLUTION INTRAVENOUS; SUBCUTANEOUS at 03:03

## 2024-03-10 RX ADMIN — CHLORHEXIDINE GLUCONATE 0.12% ORAL RINSE 15 ML: 1.2 LIQUID ORAL at 09:03

## 2024-03-10 RX ADMIN — CARVEDILOL 25 MG: 25 TABLET, FILM COATED ORAL at 09:03

## 2024-03-10 RX ADMIN — HEPARIN SODIUM 7500 UNITS: 5000 INJECTION INTRAVENOUS; SUBCUTANEOUS at 05:03

## 2024-03-10 RX ADMIN — SEVELAMER CARBONATE 1.6 G: 800 POWDER, FOR SUSPENSION ORAL at 08:03

## 2024-03-10 RX ADMIN — AMLODIPINE BESYLATE 10 MG: 10 TABLET ORAL at 08:03

## 2024-03-10 RX ADMIN — INSULIN ASPART 8 UNITS: 100 INJECTION, SOLUTION INTRAVENOUS; SUBCUTANEOUS at 08:03

## 2024-03-10 RX ADMIN — MEROPENEM 1 G: 1 INJECTION, POWDER, FOR SOLUTION INTRAVENOUS at 05:03

## 2024-03-10 RX ADMIN — Medication 500 MG: at 08:03

## 2024-03-10 RX ADMIN — INSULIN ASPART 1 UNITS: 100 INJECTION, SOLUTION INTRAVENOUS; SUBCUTANEOUS at 09:03

## 2024-03-10 RX ADMIN — SEVELAMER CARBONATE 1.6 G: 800 POWDER, FOR SUSPENSION ORAL at 09:03

## 2024-03-10 RX ADMIN — INSULIN ASPART 8 UNITS: 100 INJECTION, SOLUTION INTRAVENOUS; SUBCUTANEOUS at 09:03

## 2024-03-10 RX ADMIN — INSULIN ASPART 8 UNITS: 100 INJECTION, SOLUTION INTRAVENOUS; SUBCUTANEOUS at 03:03

## 2024-03-10 RX ADMIN — INSULIN ASPART 8 UNITS: 100 INJECTION, SOLUTION INTRAVENOUS; SUBCUTANEOUS at 01:03

## 2024-03-10 RX ADMIN — INSULIN DETEMIR 14 UNITS: 100 INJECTION, SOLUTION SUBCUTANEOUS at 08:03

## 2024-03-10 NOTE — PROGRESS NOTES
Pharmacist Renal Dose Adjustment Note    Sarah Saravia is a 53 y.o. female being treated with the medications:  Meropenem 2g Q12h  Daptomycin 8mg/kg Q24h    Patient Data:    Vital Signs (Most Recent):  Temp: 98.3 °F (36.8 °C) (03/10/24 0445)  Pulse: 85 (03/10/24 0840)  Resp: 17 (03/10/24 0840)  BP: 132/75 (03/10/24 0840)  SpO2: 97 % (03/10/24 0840) Vital Signs (72h Range):  Temp:  [97.6 °F (36.4 °C)-100.3 °F (37.9 °C)]   Pulse:  []   Resp:  [16-33]   BP: (120-180)/(60-89)   SpO2:  [92 %-100 %]      Recent Labs   Lab 03/08/24  0403 03/09/24  0340 03/10/24  0659   CREATININE 3.0* 3.2* 4.5*     Serum creatinine: 4.5 mg/dL (H) 03/10/24 0659  Estimated creatinine clearance: 21.7 mL/min (A)    Medication:  Meropenem 2g q12h will be changed to Meropenem 1g q12h  Daptomycin 8mg/kg Q24h will be changed to Daptomycin 8 mg/kg Q48h       Pharmacist's Name: Sobia Davies  Pharmacist's Extension: [phone]

## 2024-03-10 NOTE — RESPIRATORY THERAPY
"RAPID RESPONSE RESPIRATORY THERAPY PROACTIVE NOTE           Time of visit: 838     Code Status: Full Code   : 1970  Bed: 27717/12505 A:   MRN: [id number]  Time spent at the bedside: < 15 min    SITUATION    Evaluated patient for: LDA Check     BACKGROUND    Patient has no past medical history on file.  Clinically Significant Surgical Hx: tracheostomy    24 Hours Vitals Range:  Temp:  [98.3 °F (36.8 °C)-98.8 °F (37.1 °C)]   Pulse:  [85-96]   Resp:  [17-22]   BP: (132-161)/(69-89)   SpO2:  [97 %-100 %]     Labs:    Recent Labs     24  0403 24  0340 03/10/24  0659   * 137 134*   K 5.0 5.2* 5.2*   * 103 98   CO2 21* 21* 18*   * 96* 111*   CREATININE 3.0* 3.2* 4.5*   * 194* 161*   PHOS 6.6* 4.4 4.8*   MG 2.3 2.0 2.0        No results for input(s): "PH", "PCO2", "PO2", "HCO3", "POCSATURATED", "BE" in the last 72 hours.    ASSESSMENT/INTERVENTIONS  Pt on 5L 28% trach collar. Shiley size 8 cuffed trach in place. All supplies in room. Extra trachs at bedside - 6 and 8, both cuffed.       Last VS   Temp: 98.5 °F (36.9 °C) (03/10 1108)  Pulse: 90 (03/10 1126)  Resp: 19 (03/10 1108)  BP: 142/73 (03/10 1108)  SpO2: 98 % (03/10 1108)      Extra trachs at bedside: 6 and 8, both cuffed.  Level of Consciousness: Level of Consciousness (AVPU): alert  Respiratory Effort: Respiratory Effort: Unlabored Expansion/Accessory Muscle Usage: Expansion/Accessory Muscles/Retractions: no use of accessory muscles  All Lung Field Breath Sounds: All Lung Fields Breath Sounds: Anterior:, Lateral:, coarse, diminished  JOSE Breath Sounds: coarse  RUL Breath Sounds: coarse  RML Breath Sounds: Anterior:, Lateral:, coarse, diminished  O2 Device/Concentration: 5L 28% TC.  Surgical airway: Yes, Type: Shiley Size: 8, cuffed  Ambu at bedside:       Active Orders   Respiratory Care    Inhalation Treatment Q4H PRN     Frequency: Q4H PRN     Number of Occurrences: Until Specified    Oxygen Continuous     Frequency: " Continuous     Number of Occurrences: Until Specified     Order Questions:      Device type: Low flow      Device: Trach Collar      FiO2%: 40      Titrate O2 per Oxygen Titration Protocol: Yes      To maintain SpO2 goal of: >= 90%      Notify MD of: Inability to achieve desired SpO2; Sudden change in patient status and requires 20% increase in FiO2; Patient requires >60% FiO2    Pulse Oximetry Continuous     Frequency: Continuous     Number of Occurrences: Until Specified    Routine tracheostomy care     Frequency: BID     Number of Occurrences: Until Specified    SUCTION Q4H     Frequency: Q4H     Number of Occurrences: Until Specified       RECOMMENDATIONS    We recommend: RRT Recs: Continue POC per primary team.      FOLLOW-UP    Please call back the Rapid Response RTJavier RRT at x [phone] for any questions or concerns.

## 2024-03-10 NOTE — PROGRESS NOTES
[facility] - Stepdown Flex ([place] [address])  [facility] Medicine  Progress Note    Patient Name: Sarah Saravia  MRN: [id number]  Patient Class: IP- Inpatient   Admission Date: 3/7/2024  Length of Stay: 2 days  Attending Physician: Janett Jean MD  [facility]        Subjective:     Principal Problem:Sepsis        HPI:  53 y.o. female with hx of recent Ayaz's gangrene s/p surgical debridement, T2DM, AHRF s/p tracheostomy, embolic CVA, presents to the ED from LTAC with low grade fevers. 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 Hospital course was complicated by DKA, ART requiring RRT, acute respiratory failure requiring intubation and subsequent tracheostomy, and was found to have multiple infarcts on MRI brain with minimal spontaneous movements.  Palliative consulted however family wanted to proceed with trach (8.0/85 mm cuffed), PEG placement on 2/22/24. She was also evaluated by Nephrology for inability to fully concentrate urine. She was not completely anuric however due to ongoing needs for hemodialysis a tunneled PermCath was placed by Interventional Radiology on 2/29. Prior cultures w/ proteus mirabilis and bacteroides. She was treated with broad spectrum antibiotics and completed antibiotics 2/22. Patient was discharged to LT for wound care, therapy, respiratory and trach management on 3/5. She developed a fever 100.3 F with WBC trending up while at LTAC yesterday. She was noted to have copious malodorous purulence drainage from her wound and was started on Daptomycin and Meropenum by ID yesterday and was recommend for patient to be transferred to the ED for CT abd.    In the ED, patient fevered at 100.3, was tachycardiac, tachypneic and sating at 100% on 5 L/min and FiO2 of 28 via trach. Labs notable for WBC 14.18, Hgb 8.9, Bicarb 20, ,  Cr 2.6, Phos 6.1, CRP 27.5. UA with WBC 28/hpf, Leukocyte esterases +1 concerning for UTI. CT A/P showed open wound of the right mons pubis with surrounding subcutaneous stranding and some trace subcutaneous gas concerning for infection. Gen surgery evaluated in ED; recommended admission to hospital medicine management of her chronic conditions.     History limited by patient's condition.    Overview/Hospital Course:  Presenting from LTAC w/ low grade fevers and increased drainage from wounds. Gen surg consulted; s/p bedside debridement. On Dapto/joyce. ID and nephro consulted. Groin abscess w/ pseudomonas sensitive to meropenem. Respiratory culture and urine culture both w/ GNR pending speciation and sensitivity. Can likely DC back to LTAC on 3/11 if she remains clinically stable.       Interval History: No acute events overnight. Tolerating trach collar. Labs stable. Vitals stable.     Review of Systems   Unable to perform ROS: Acuity of condition     Objective:     Vital Signs (Most Recent):  Temp: 98.5 °F (36.9 °C) (03/10/24 1108)  Pulse: 90 (03/10/24 1126)  Resp: 19 (03/10/24 1108)  BP: (!) 142/73 (03/10/24 1108)  SpO2: 98 % (03/10/24 1108) Vital Signs (24h Range):  Temp:  [98.3 °F (36.8 °C)-98.8 °F (37.1 °C)] 98.5 °F (36.9 °C)  Pulse:  [85-96] 90  Resp:  [17-22] 19  SpO2:  [97 %-99 %] 98 %  BP: (132-161)/(73-89) 142/73     Weight: (!) 142 kg (313 lb 0.9 oz)  Body mass index is 47.6 kg/m².    Intake/Output Summary (Last 24 hours) at 3/10/2024 1302  Last data filed at 3/10/2024 0538  Gross per 24 hour   Intake 1150 ml   Output 150 ml   Net 1000 ml         Physical Exam  Vitals and nursing note reviewed.   Constitutional:       General: She is not in acute distress.     Appearance: She is obese. She is ill-appearing and toxic-appearing.      Comments: Unable to respond or follow commands   HENT:      Head: Atraumatic.   Neck:      Comments: Tracheostomy in place    Cardiovascular:      Rate and Rhythm: Normal rate  and regular rhythm.      Pulses: Normal pulses.      Heart sounds: Normal heart sounds.   Pulmonary:      Effort: Pulmonary effort is normal.      Breath sounds: Normal breath sounds.   Abdominal:      General: Abdomen is flat.      Palpations: Abdomen is soft.   Musculoskeletal:         General: Normal range of motion.   Skin:     Comments: Skin assessment - No areas of ulceration or blistering of bony prominences.    Neurological:      General: No focal deficit present.      Mental Status: She is oriented to person, place, and time.   Psychiatric:         Mood and Affect: Mood normal.         Behavior: Behavior normal.             Significant Labs: All pertinent labs within the past 24 hours have been reviewed.    Significant Imaging: I have reviewed all pertinent imaging results/findings within the past 24 hours.    Assessment/Plan:      * Sepsis  53 y.o. y/o female with a PMHx of Ayaz's gangrene who presented with symptoms of fever with concerns for sepsis. Suspected source likely Cellulitis and UTI. Met SIRS criteria with RR 33, Temp 100.3, WBC 14.11. Started on Daptomycin and Meropenum.     - See Ayaz's gangrene  - Wound culture w/ pseudomonas sensitive to meropenum. ID consulted; appreciate recs   - Continue Daptomycin and meropenum      Hypernatremia  - see renal failure       Anemia due to chronic kidney disease  Patient's anemia is currently controlled. Has not received any PRBCs to date. Etiology likely d/t chronic disease due to ESRD  Current CBC reviewed-   Lab Results   Component Value Date    HGB 8.5 (L) 03/10/2024    HCT 26.8 (L) 03/10/2024     Monitor serial CBC and transfuse if patient becomes hemodynamically unstable, symptomatic or H/H drops below 7/21.    UTI (urinary tract infection)  UA significant for WBC >100/hpf with occasion bacteria   - Follow up Ucx w/ GNR      Diarrhea  Rectal tube in place     - Continue psyllium     HTN (hypertension)  -Continue Home amlodipine 10 daily and  Coreg 25 BID   - Hydralazine and Labetelol PRN    Dysphagia  Likely 2/2 to CVA. S/p PEG placement. On TF at LTAC    - Nutrition consulted   - Resume Tube feeds      Severe malnutrition  Nutrition consulted. Most recent weight and BMI monitored-     Measurements:  Wt Readings from Last 1 Encounters:   03/10/24 (!) 142 kg (313 lb 0.9 oz)   Body mass index is 47.6 kg/m².    Patient has been screened and assessed by RD.    Malnutrition Type:  Context:    Level:      Malnutrition Characteristic Summary:       Interventions/Recommendations (treatment strategy):  1. TF RECS: Novasource Renal @40ml/hr to provide 1920 kcals, 87g PRO, 688 ml fluid w/ additonal FWF per MD 2. Monitor labs and wt      Thrombocytosis  - stable       Status post tracheostomy  S/P tracheostomy on 2/22. Per chart review, pulm considering trach exchange early next week.     - Continue trach collar as tolerated  - Wean oxygen for goal spO2>90%  - Continue duonebs prn   - routine trach care  - Suction q4hr       Hyperphosphatemia  - see renal failure     Encephalopathy  Likely 2/2 to embolic CVA during prior admission. CTH 2/3, MRI 2/5 with scattered hypoattenuation likely representing infarcts. EEG findings consistent with moderate-severe encephalopathy.  Repeat CTH and MRI/MRA unchanged from prior exams.LP 2/16. Elevated WBCs and protein consistent with bacterial meningitis. CSF culture 2/19 with no growth    - Delirium precautions  - PT/OT/ST consults    Type 2 diabetes mellitus, with long-term current use of insulin  Initially presented with DKA  A1c 10.4 on prior admission.     -  q4h BG monitoring while NPO per endocrine  - Detemir 14 units daily  - Novolog aspart 8units q4h while on TFs  - Moderate dose SSI PRN      Renal failure  Developed ALVARADO most likley ATN in setting of septic shock; requiring RRT during prior admission. Initially needed hemodialysis s/p tunneled PermCath placement. Showed signs of renal recovery but now worsening w/ Cr at  3.0 on admission.     - Nephrology consult  - Daily Renal Function Panel   - Avoid Hypotension.  - Renally dose all meds  - Please avoid nephrotoxins, including NSAIDs, aminoglycosides, IV contrast (unless absolutely necessary), gadolinium, fleets and other phosphorous-based laxatives. Caution with antibiotics.      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 After a prolonged hospital course she was discharged to LTAC on 3/7 however is now representing on 3/8 with fever, pyuria, and drainage from her posterior wound. Was seen by ID in LTAC and was started on Daptomycin and Meropenum prior to arrival.  CT A/P showed open wound of the right mons pubis with surrounding subcutaneous stranding and some trace subcutaneous gas concerning for infection.    - Surgery following. Opened wound on 3/8 and culture w/ pseudomonas sensitive to meropenem.   - Continue Daptomycin and Meropenem  - ID consulted; continue abx  - Follow up on wound cx GNR f/u spec and shireen, urine cx NGTD, blood cx NGTD  - Wound care consulted       VTE Risk Mitigation (From admission, onward)           Ordered     heparin (porcine) injection 1,000 Units  As needed (PRN)         03/10/24 1122     heparin (porcine) injection 1,000 Units  As needed (PRN)         03/08/24 1715     heparin (porcine) injection 7,500 Units  Every 8 hours         03/08/24 0334                    Discharge Planning   ZEKE: 3/10/2024     Code Status: Full Code   Is the patient medically ready for discharge?: No    Reason for patient still in hospital (select all that apply): Patient trending condition  Discharge Plan A: Skilled Nursing Facility            Soco Cristobal MD  Department of  [facility] Medicine   [facility] - Stepdown Flex ([place]-14)

## 2024-03-10 NOTE — ASSESSMENT & PLAN NOTE
53 y.o. y/o female with a PMHx of Ayaz's gangrene who presented with symptoms of fever with concerns for sepsis. Suspected source likely Cellulitis and UTI. Met SIRS criteria with RR 33, Temp 100.3, WBC 14.11. Started on Daptomycin and Meropenum.     - See Ayaz's gangrene  - Wound culture w/ pseudomonas sensitive to meropenum. ID consulted; appreciate recs   - Continue Daptomycin and meropenum

## 2024-03-10 NOTE — SUBJECTIVE & OBJECTIVE
Interval History: No acute events overnight. Tolerating trach collar. Labs stable. Vitals stable.     Review of Systems   Unable to perform ROS: Acuity of condition     Objective:     Vital Signs (Most Recent):  Temp: 98.5 °F (36.9 °C) (03/10/24 1108)  Pulse: 90 (03/10/24 1126)  Resp: 19 (03/10/24 1108)  BP: (!) 142/73 (03/10/24 1108)  SpO2: 98 % (03/10/24 1108) Vital Signs (24h Range):  Temp:  [98.3 °F (36.8 °C)-98.8 °F (37.1 °C)] 98.5 °F (36.9 °C)  Pulse:  [85-96] 90  Resp:  [17-22] 19  SpO2:  [97 %-99 %] 98 %  BP: (132-161)/(73-89) 142/73     Weight: (!) 142 kg (313 lb 0.9 oz)  Body mass index is 47.6 kg/m².    Intake/Output Summary (Last 24 hours) at 3/10/2024 1302  Last data filed at 3/10/2024 0538  Gross per 24 hour   Intake 1150 ml   Output 150 ml   Net 1000 ml         Physical Exam  Vitals and nursing note reviewed.   Constitutional:       General: She is not in acute distress.     Appearance: She is obese. She is ill-appearing and toxic-appearing.      Comments: Unable to respond or follow commands   HENT:      Head: Atraumatic.   Neck:      Comments: Tracheostomy in place    Cardiovascular:      Rate and Rhythm: Normal rate and regular rhythm.      Pulses: Normal pulses.      Heart sounds: Normal heart sounds.   Pulmonary:      Effort: Pulmonary effort is normal.      Breath sounds: Normal breath sounds.   Abdominal:      General: Abdomen is flat.      Palpations: Abdomen is soft.   Musculoskeletal:         General: Normal range of motion.   Skin:     Comments: Skin assessment - No areas of ulceration or blistering of bony prominences.    Neurological:      General: No focal deficit present.      Mental Status: She is oriented to person, place, and time.   Psychiatric:         Mood and Affect: Mood normal.         Behavior: Behavior normal.             Significant Labs: All pertinent labs within the past 24 hours have been reviewed.    Significant Imaging: I have reviewed all pertinent imaging  results/findings within the past 24 hours.

## 2024-03-10 NOTE — ASSESSMENT & PLAN NOTE
52 y/o female who was recently admitted for forniers gangrene s/p multiple debridements, with a prolonged hospital course due to CVA, renal failure requiring HD, respiratory failure requring trach and PEG placement. After a prolonged hospital course she was discharged to LTAC on 3/7 however is now representing on 3/8 with fever, pyuria, and drainage from her posterior wound. Was seen by ID in LTAC and was started on Daptomycin and Meropenum prior to arrival.  CT A/P showed open wound of the right mons pubis with surrounding subcutaneous stranding and some trace subcutaneous gas concerning for infection.    - Surgery following. Opened wound on 3/8 and culture w/ pseudomonas sensitive to meropenem.   - Continue Daptomycin and Meropenem  - ID consulted; continue abx  - Follow up on wound cx GNR f/u spec and shireen, urine cx NGTD, blood cx NGTD  - Wound care consulted

## 2024-03-10 NOTE — ASSESSMENT & PLAN NOTE
Initially presented with DKA  A1c 10.4 on prior admission.     -  q4h BG monitoring while NPO per endocrine  - Detemir 14 units daily  - Novolog aspart 8units q4h while on TFs  - Moderate dose SSI PRN     Letter of Medical Necessity   Today's Date: 10/13/2023    Patient: Juan Mckee  YOB: 1962      This is to notify you that Juan Mckee  was seen in my office in consultation for evaluation as a potential candidate for a Medically Supervised Weight Loss Program.    Adriana Burdick's chief diagnosis is Obesity (278.00). The co-morbidities and accompanying DX are ***. Juan Mckee is a 64y.o. year old *** with who weighs  *** pounds, stands  ***, and has a BMI of  There is no height or weight on file to calculate BMI. .    Adriana Burdick's weight at age 25 was *** pounds. The highest weight is patient's current weight of  *** pounds. Previous weight loss attempts include *** all having little or no lasting success. It is my professional opinion that this individual will have success with the [employer] Medically Supervised weight loss program. I anticipate that this diet in conjunction with our comprehensive multi-disciplinary program which includes regular medical supervision, weekly nutrition educational classes, very low calorie diet meal replacements, and support will contribute to significant weight loss with resultant reversal and improvement of co-morbid  factors and as has been reported, the possibility of early death.         Sincerely,        ***    TAX ID: ***

## 2024-03-10 NOTE — ASSESSMENT & PLAN NOTE
Patient's anemia is currently controlled. Has not received any PRBCs to date. Etiology likely d/t chronic disease due to ESRD  Current CBC reviewed-   Lab Results   Component Value Date    HGB 8.5 (L) 03/10/2024    HCT 26.8 (L) 03/10/2024     Monitor serial CBC and transfuse if patient becomes hemodynamically unstable, symptomatic or H/H drops below 7/21.

## 2024-03-10 NOTE — ASSESSMENT & PLAN NOTE
Nutrition consulted. Most recent weight and BMI monitored-     Measurements:  Wt Readings from Last 1 Encounters:   03/10/24 (!) 142 kg (313 lb 0.9 oz)   Body mass index is 47.6 kg/m².    Patient has been screened and assessed by RD.    Malnutrition Type:  Context:    Level:      Malnutrition Characteristic Summary:       Interventions/Recommendations (treatment strategy):  1. TF RECS: Novasource Renal @40ml/hr to provide 1920 kcals, 87g PRO, 688 ml fluid w/ additonal FWF per MD 2. Monitor labs and wt

## 2024-03-11 LAB
ALBUMIN SERPL BCP-MCNC: 2.4 G/DL (ref 3.5–5.2)
ALP SERPL-CCNC: 145 U/L (ref 55–135)
ALT SERPL W/O P-5'-P-CCNC: 23 U/L (ref 10–44)
ANION GAP SERPL CALC-SCNC: 14 MMOL/L (ref 8–16)
AST SERPL-CCNC: 25 U/L (ref 10–40)
BACTERIA SPEC AEROBE CULT: ABNORMAL
BASOPHILS # BLD AUTO: 0.02 K/UL (ref 0–0.2)
BASOPHILS NFR BLD: 0.2 % (ref 0–1.9)
BILIRUB SERPL-MCNC: 0.2 MG/DL (ref 0.1–1)
BUN SERPL-MCNC: 115 MG/DL (ref 6–20)
CALCIUM SERPL-MCNC: 9.8 MG/DL (ref 8.7–10.5)
CHLORIDE SERPL-SCNC: 98 MMOL/L (ref 95–110)
CO2 SERPL-SCNC: 20 MMOL/L (ref 23–29)
CREAT SERPL-MCNC: 5.5 MG/DL (ref 0.5–1.4)
DIFFERENTIAL METHOD BLD: ABNORMAL
EOSINOPHIL # BLD AUTO: 1.1 K/UL (ref 0–0.5)
EOSINOPHIL NFR BLD: 9.2 % (ref 0–8)
ERYTHROCYTE [DISTWIDTH] IN BLOOD BY AUTOMATED COUNT: 15.6 % (ref 11.5–14.5)
EST. GFR  (NO RACE VARIABLE): 8.7 ML/MIN/1.73 M^2
GLUCOSE SERPL-MCNC: 157 MG/DL (ref 70–110)
GRAM STN SPEC: ABNORMAL
HCT VFR BLD AUTO: 26.2 % (ref 37–48.5)
HGB BLD-MCNC: 8.4 G/DL (ref 12–16)
IMM GRANULOCYTES # BLD AUTO: 0.08 K/UL (ref 0–0.04)
IMM GRANULOCYTES NFR BLD AUTO: 0.7 % (ref 0–0.5)
LYMPHOCYTES # BLD AUTO: 2.3 K/UL (ref 1–4.8)
LYMPHOCYTES NFR BLD: 19 % (ref 18–48)
MAGNESIUM SERPL-MCNC: 2 MG/DL (ref 1.6–2.6)
MCH RBC QN AUTO: 28.8 PG (ref 27–31)
MCHC RBC AUTO-ENTMCNC: 32.1 G/DL (ref 32–36)
MCV RBC AUTO: 90 FL (ref 82–98)
MONOCYTES # BLD AUTO: 0.9 K/UL (ref 0.3–1)
MONOCYTES NFR BLD: 7.6 % (ref 4–15)
NEUTROPHILS # BLD AUTO: 7.8 K/UL (ref 1.8–7.7)
NEUTROPHILS NFR BLD: 63.3 % (ref 38–73)
NRBC BLD-RTO: 0 /100 WBC
PHOSPHATE SERPL-MCNC: 4.2 MG/DL (ref 2.7–4.5)
PLATELET # BLD AUTO: 376 K/UL (ref 150–450)
PMV BLD AUTO: 10.5 FL (ref 9.2–12.9)
POCT GLUCOSE: 120 MG/DL (ref 70–110)
POCT GLUCOSE: 134 MG/DL (ref 70–110)
POCT GLUCOSE: 138 MG/DL (ref 70–110)
POCT GLUCOSE: 142 MG/DL (ref 70–110)
POCT GLUCOSE: 163 MG/DL (ref 70–110)
POCT GLUCOSE: 170 MG/DL (ref 70–110)
POCT GLUCOSE: 173 MG/DL (ref 70–110)
POCT GLUCOSE: 179 MG/DL (ref 70–110)
POCT GLUCOSE: 191 MG/DL (ref 70–110)
POTASSIUM SERPL-SCNC: 4.5 MMOL/L (ref 3.5–5.1)
PROT SERPL-MCNC: 8.2 G/DL (ref 6–8.4)
RBC # BLD AUTO: 2.92 M/UL (ref 4–5.4)
SODIUM SERPL-SCNC: 132 MMOL/L (ref 136–145)
WBC # BLD AUTO: 12.24 K/UL (ref 3.9–12.7)

## 2024-03-11 PROCEDURE — 20600001 HC STEP DOWN PRIVATE ROOM

## 2024-03-11 PROCEDURE — 80100014 HC HEMODIALYSIS 1:1

## 2024-03-11 PROCEDURE — 99900035 HC TECH TIME PER 15 MIN (STAT)

## 2024-03-11 PROCEDURE — 97530 THERAPEUTIC ACTIVITIES: CPT | Mod: CQ

## 2024-03-11 PROCEDURE — 63600175 PHARM REV CODE 636 W HCPCS: Performed by: STUDENT IN AN ORGANIZED HEALTH CARE EDUCATION/TRAINING PROGRAM

## 2024-03-11 PROCEDURE — 25000003 PHARM REV CODE 250: Performed by: STUDENT IN AN ORGANIZED HEALTH CARE EDUCATION/TRAINING PROGRAM

## 2024-03-11 PROCEDURE — 83735 ASSAY OF MAGNESIUM: CPT

## 2024-03-11 PROCEDURE — 99233 SBSQ HOSP IP/OBS HIGH 50: CPT | Mod: ,,, | Performed by: STUDENT IN AN ORGANIZED HEALTH CARE EDUCATION/TRAINING PROGRAM

## 2024-03-11 PROCEDURE — 25000003 PHARM REV CODE 250

## 2024-03-11 PROCEDURE — 90935 HEMODIALYSIS ONE EVALUATION: CPT

## 2024-03-11 PROCEDURE — 27000207 HC ISOLATION

## 2024-03-11 PROCEDURE — 97530 THERAPEUTIC ACTIVITIES: CPT

## 2024-03-11 PROCEDURE — 27000221 HC OXYGEN, UP TO 24 HOURS

## 2024-03-11 PROCEDURE — 99233 SBSQ HOSP IP/OBS HIGH 50: CPT | Mod: ,,, | Performed by: INTERNAL MEDICINE

## 2024-03-11 PROCEDURE — 99900026 HC AIRWAY MAINTENANCE (STAT)

## 2024-03-11 PROCEDURE — 25000003 PHARM REV CODE 250: Performed by: HOSPITALIST

## 2024-03-11 PROCEDURE — 94761 N-INVAS EAR/PLS OXIMETRY MLT: CPT

## 2024-03-11 PROCEDURE — 63600175 PHARM REV CODE 636 W HCPCS

## 2024-03-11 PROCEDURE — 85025 COMPLETE CBC W/AUTO DIFF WBC: CPT

## 2024-03-11 PROCEDURE — 84100 ASSAY OF PHOSPHORUS: CPT

## 2024-03-11 PROCEDURE — 63600175 PHARM REV CODE 636 W HCPCS: Performed by: HOSPITALIST

## 2024-03-11 PROCEDURE — 36415 COLL VENOUS BLD VENIPUNCTURE: CPT

## 2024-03-11 PROCEDURE — 80053 COMPREHEN METABOLIC PANEL: CPT

## 2024-03-11 RX ORDER — SODIUM CHLORIDE 9 MG/ML
INJECTION, SOLUTION INTRAVENOUS ONCE
Status: COMPLETED | OUTPATIENT
Start: 2024-03-11 | End: 2024-03-11

## 2024-03-11 RX ADMIN — MEROPENEM 1 G: 1 INJECTION, POWDER, FOR SOLUTION INTRAVENOUS at 06:03

## 2024-03-11 RX ADMIN — CARVEDILOL 25 MG: 25 TABLET, FILM COATED ORAL at 10:03

## 2024-03-11 RX ADMIN — INSULIN DETEMIR 14 UNITS: 100 INJECTION, SOLUTION SUBCUTANEOUS at 11:03

## 2024-03-11 RX ADMIN — INSULIN ASPART 1 UNITS: 100 INJECTION, SOLUTION INTRAVENOUS; SUBCUTANEOUS at 01:03

## 2024-03-11 RX ADMIN — SEVELAMER CARBONATE 1.6 G: 800 POWDER, FOR SUSPENSION ORAL at 10:03

## 2024-03-11 RX ADMIN — ZINC SULFATE 220 MG (50 MG) CAPSULE 220 MG: CAPSULE at 11:03

## 2024-03-11 RX ADMIN — HEPARIN SODIUM 7500 UNITS: 5000 INJECTION INTRAVENOUS; SUBCUTANEOUS at 10:03

## 2024-03-11 RX ADMIN — MEROPENEM 1 G: 1 INJECTION, POWDER, FOR SOLUTION INTRAVENOUS at 07:03

## 2024-03-11 RX ADMIN — INSULIN ASPART 8 UNITS: 100 INJECTION, SOLUTION INTRAVENOUS; SUBCUTANEOUS at 06:03

## 2024-03-11 RX ADMIN — INSULIN ASPART 8 UNITS: 100 INJECTION, SOLUTION INTRAVENOUS; SUBCUTANEOUS at 11:03

## 2024-03-11 RX ADMIN — Medication 500 MG: at 10:03

## 2024-03-11 RX ADMIN — CARVEDILOL 25 MG: 25 TABLET, FILM COATED ORAL at 11:03

## 2024-03-11 RX ADMIN — INSULIN ASPART 8 UNITS: 100 INJECTION, SOLUTION INTRAVENOUS; SUBCUTANEOUS at 10:03

## 2024-03-11 RX ADMIN — HEPARIN SODIUM 7500 UNITS: 5000 INJECTION INTRAVENOUS; SUBCUTANEOUS at 06:03

## 2024-03-11 RX ADMIN — MUPIROCIN: 20 OINTMENT TOPICAL at 11:03

## 2024-03-11 RX ADMIN — HEPARIN SODIUM 1000 UNITS: 1000 INJECTION, SOLUTION INTRAVENOUS; SUBCUTANEOUS at 05:03

## 2024-03-11 RX ADMIN — SODIUM CHLORIDE: 9 INJECTION, SOLUTION INTRAVENOUS at 02:03

## 2024-03-11 RX ADMIN — AMLODIPINE BESYLATE 10 MG: 10 TABLET ORAL at 11:03

## 2024-03-11 RX ADMIN — MUPIROCIN: 20 OINTMENT TOPICAL at 10:03

## 2024-03-11 RX ADMIN — Medication 500 MG: at 11:03

## 2024-03-11 RX ADMIN — CHLORHEXIDINE GLUCONATE 0.12% ORAL RINSE 15 ML: 1.2 LIQUID ORAL at 10:03

## 2024-03-11 RX ADMIN — SEVELAMER CARBONATE 1.6 G: 800 POWDER, FOR SUSPENSION ORAL at 11:03

## 2024-03-11 RX ADMIN — CHLORHEXIDINE GLUCONATE 0.12% ORAL RINSE 15 ML: 1.2 LIQUID ORAL at 11:03

## 2024-03-11 RX ADMIN — INSULIN ASPART 2 UNITS: 100 INJECTION, SOLUTION INTRAVENOUS; SUBCUTANEOUS at 06:03

## 2024-03-11 RX ADMIN — INSULIN ASPART 8 UNITS: 100 INJECTION, SOLUTION INTRAVENOUS; SUBCUTANEOUS at 01:03

## 2024-03-11 RX ADMIN — DAPTOMYCIN 1040 MG: 350 INJECTION, POWDER, LYOPHILIZED, FOR SOLUTION INTRAVENOUS at 06:03

## 2024-03-11 RX ADMIN — PANTOPRAZOLE SODIUM 40 MG: 40 GRANULE, DELAYED RELEASE ORAL at 11:03

## 2024-03-11 NOTE — RESPIRATORY THERAPY
"RAPID RESPONSE RESPIRATORY THERAPY PROACTIVE NOTE           Time of visit: 904     Code Status: Full Code   : 1970  Bed: 51463/63775 A:   MRN: [id number]  Time spent at the bedside: < 15 min    SITUATION    Evaluated patient for: LDA Check     BACKGROUND    Patient has no past medical history on file.  Clinically Significant Surgical Hx: tracheostomy    24 Hours Vitals Range:  Temp:  [98 °F (36.7 °C)-98.5 °F (36.9 °C)]   Pulse:  [89-98]   Resp:  [17-22]   BP: (134-174)/(73-86)   SpO2:  [96 %-99 %]     Labs:    Recent Labs     24  0340 03/10/24  0659 24  0405    134* 132*   K 5.2* 5.2* 4.5    98 98   CO2 21* 18* 20*   BUN 96* 111* 115*   CREATININE 3.2* 4.5* 5.5*   * 161* 157*   PHOS 4.4 4.8* 4.2   MG 2.0 2.0 2.0        No results for input(s): "PH", "PCO2", "PO2", "HCO3", "POCSATURATED", "BE" in the last 72 hours.    ASSESSMENT/INTERVENTIONS  Patient resting comfortably. No respiratory concerns at this time.      Last VS   Temp: 98 °F (36.7 °C) (723)  Pulse: 96 (749)  Resp: 19 (749)  BP: 149/83 (723)  SpO2: 96 % (749)      Extra trachs at bedside: #6 Shiley Cuffed, #8 Shiley Cuffed  Level of Consciousness: Level of Consciousness (AVPU): alert  Respiratory Effort: Respiratory Effort: Normal, Unlabored Expansion/Accessory Muscle Usage: Expansion/Accessory Muscles/Retractions: no use of accessory muscles, no retractions  All Lung Field Breath Sounds: All Lung Fields Breath Sounds: Anterior:, Lateral:, diminished  JOSE Breath Sounds: coarse  LLL Breath Sounds: Anterior:, Lateral:, diminished, clear  RUL Breath Sounds: coarse  RML Breath Sounds: Anterior:, Lateral:, coarse, diminished  RLL Breath Sounds: Anterior:, Lateral:, diminished  O2 Device/Concentration: 5L/28%  Surgical airway: Yes, Type: Shiley Size: 8, cuffed  Ambu at bedside:       Active Orders   Respiratory Care    Inhalation Treatment Q4H PRN     Frequency: Q4H PRN     Number of " Occurrences: Until Specified    Oxygen Continuous     Frequency: Continuous     Number of Occurrences: Until Specified     Order Questions:      Device type: Low flow      Device: Trach Collar      FiO2%: 40      Titrate O2 per Oxygen Titration Protocol: Yes      To maintain SpO2 goal of: >= 90%      Notify MD of: Inability to achieve desired SpO2; Sudden change in patient status and requires 20% increase in FiO2; Patient requires >60% FiO2    Pulse Oximetry Continuous     Frequency: Continuous     Number of Occurrences: Until Specified    Routine tracheostomy care     Frequency: BID     Number of Occurrences: Until Specified    SUCTION Q4H     Frequency: Q4H     Number of Occurrences: Until Specified       RECOMMENDATIONS    We recommend: RRT Recs: Continue POC per primary team.      FOLLOW-UP    Please call back the Rapid Response RTYadi RRT at x [phone] for any questions or concerns.

## 2024-03-11 NOTE — PROGRESS NOTES
[facility] ([place] [id number])  Nephrology  Progress Note    Patient Name: Sarah Saravia  MRN: [id number]  Admission Date: 3/7/2024  Hospital Length of Stay: 3 days  Attending Provider: Janett Jean MD   Primary Care Physician: [facility]  Principal Problem:Sepsis    Subjective:     HPI:  53 y.o. female with hx of recent Ayaz's gangrene s/p surgical debridement, T2DM, AHRF s/p tracheostomy, embolic CVA, presents to the ED from LTAC with low grade fevers. 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 Hospital course was complicated by DKA, ART requiring RRT, acute respiratory failure requiring intubation and subsequent tracheostomy, and was found to have multiple infarcts on MRI brain with minimal spontaneous movements.  Palliative consulted however family wanted to proceed with trach (8.0/85 mm cuffed), PEG placement on 2/22/24.     Patient with perm cath in place since last visit. Had ongoing recovering ALVARADO, without acute needs for RRT. Unfortunately now presenters with fever, leukocytosis, and concerns wound infection. Surgery evaluated patient at bedside and do not feel wound is source of infection at this time.     Patient with adequate UOP; however worsened renal function ~3; BUN ~124; K 5.     Nephrology consulted for ALVARADO and evaluation for RRT needs.     Interval History: no acute events overnight    Review of patient's allergies indicates:  No Known Allergies  Current Facility-Administered Medications   Medication Frequency    0.9%  NaCl infusion Once    0.9%  NaCl infusion Once    acetaminophen oral solution 650 mg Q4H PRN    albuterol-ipratropium 2.5 mg-0.5 mg/3 mL nebulizer solution 3 mL Q4H PRN    amLODIPine tablet 10 mg Daily    ascorbic acid (vitamin C) tablet 500 mg BID    carvediloL tablet 25 mg BID    chlorhexidine 0.12 % solution 15 mL BID     DAPTOmycin (CUBICIN) 1,040 mg in sodium chloride 0.9% SolP 50 mL IVPB Q48H    dextrose 10% bolus 125 mL 125 mL PRN    dextrose 10% bolus 250 mL 250 mL PRN    glucagon (human recombinant) injection 1 mg PRN    heparin (porcine) injection 1,000 Units PRN    heparin (porcine) injection 1,000 Units PRN    heparin (porcine) injection 7,500 Units Q8H    hydrALAZINE tablet 25 mg Q8H PRN    insulin aspart U-100 pen 0-10 Units Q4H PRN    insulin aspart U-100 pen 8 Units Q4H    [START ON 3/12/2024] insulin detemir U-100 (Levemir) pen 9 Units BID    labetalol 20 mg/4 mL (5 mg/mL) IV syring Q6H PRN    meropenem (MERREM) 1 g in sodium chloride 0.9 % 100 mL IVPB (MB+) Q12H    mupirocin 2 % ointment BID    naloxone 0.4 mg/mL injection 0.02 mg PRN    ondansetron injection 4 mg Q6H PRN    pantoprazole suspension 40 mg Daily    prochlorperazine injection Soln 5 mg Q6H PRN    psyllium husk (aspartame) 3.4 gram packet 1 packet Daily    sevelamer carbonate pwpk 1.6 g TID    sodium chloride 0.9% bolus 250 mL 250 mL PRN    sodium chloride 0.9% bolus 250 mL 250 mL PRN    sodium chloride 0.9% flush 10 mL Q12H PRN    zinc sulfate capsule 220 mg Daily       Objective:     Vital Signs (Most Recent):  Temp: 99.6 °F (37.6 °C) (03/11/24 1422)  Pulse: 90 (03/11/24 1428)  Resp: 19 (03/11/24 0749)  BP: (!) 155/80 (03/11/24 1428)  SpO2: 100 % (03/11/24 1422) Vital Signs (24h Range):  Temp:  [98 °F (36.7 °C)-99.6 °F (37.6 °C)] 99.6 °F (37.6 °C)  Pulse:  [89-98] 90  Resp:  [17-22] 19  SpO2:  [96 %-100 %] 100 %  BP: (134-174)/(73-86) 155/80     Weight: (!) 145.1 kg (319 lb 14.2 oz) (03/11/24 0400)  Body mass index is 48.64 kg/m².  Body surface area is 2.64 meters squared.    I/O last 3 completed shifts:  In: 2210 [NG/GT:2060; IV Piggyback:150]  Out: 450 [Urine:350; Stool:100]     Physical Exam  Vitals and nursing note reviewed.   Constitutional:       General: She is not in acute distress.     Appearance: She is obese. She is not ill-appearing or  toxic-appearing.      Comments: Unable to respond or follow commands   HENT:      Head: Atraumatic.   Neck:      Comments: Tracheostomy in place    Cardiovascular:      Rate and Rhythm: Normal rate and regular rhythm.      Pulses: Normal pulses.      Heart sounds: Normal heart sounds.   Pulmonary:      Effort: Pulmonary effort is normal.      Breath sounds: Normal breath sounds.   Abdominal:      General: Abdomen is flat.      Palpations: Abdomen is soft.   Musculoskeletal:         General: Normal range of motion.   Skin:     Comments: Skin assessment - No areas of ulceration or blistering of bony prominences.    Neurological:      General: No focal deficit present.      Mental Status: She is oriented to person, place, and time.   Psychiatric:         Mood and Affect: Mood normal.         Behavior: Behavior normal.          Significant Labs:  All labs within the past 24 hours have been reviewed.     Significant Imaging:  Labs: Reviewed  Assessment/Plan:     Renal/  ALVARADO (acute kidney injury)  Nonoliguric ALVARADO on baseline normal renal function  - etiology for ALVARADO 2/2 to ischemic ATN in setting of septic shock.   - was in recovery process; UOP adequate; BUN 2/2 to azotemia from elevated protein content.   - perm cath placed on 02/29/24;   - now presented with ALVARADO ~3 from ~2.4 yesterday suspect 2/2 to hypotension/prerenal insult.     Plan:  - Will plan for iHD today  - no need for scheduled diuretics; okay to use PRN for volume overload  - strict ins and outs  - renally dose all med  - avoid nephrotoxins.         Thank you for your consult. I will follow-up with patient. Please contact us if you have any additional questions.    Enrique Stokes MD  Nephrology  [facility] ([place])

## 2024-03-11 NOTE — PT/OT/SLP PROGRESS
Occupational Therapy   Treatment    Name: Sarah Saravia  MRN: [id number]  Admitting Diagnosis:  Sepsis       Recommendations:     Discharge Recommendations: Moderate Intensity Therapy  Discharge Equipment Recommendations:  wheelchair, lift device, hospital bed  Barriers to discharge:  None    Assessment:     Sarah Saravia is a 53 y.o. female with a medical diagnosis of Sepsis.  Patient's boyfriend present at bedside educated on PROM program to reduce contractures and maintain muscle. He demonstrated teach back understanding for UE exercises. Per boyfriend, patient has not followed commands but will occasionally squeeze family's hands (but do not feel like it always purposeful). Performance deficits affecting function are weakness, impaired endurance, impaired self care skills, impaired functional mobility, gait instability, impaired balance, decreased safety awareness, decreased lower extremity function, decreased upper extremity function, impaired cognition, impaired coordination, impaired skin. Patient continues to demonstrate the need for moderate intensity therapy on a daily basis post acute exhibited by decreased independence with self-care and functional mobility     Rehab Prognosis:  Good; patient would benefit from acute skilled OT services to address these deficits and reach maximum level of function.       Plan:     Patient to be seen 3 x/week to address the above listed problems via self-care/home management, therapeutic activities, therapeutic exercises, neuromuscular re-education  Plan of Care Expires: 04/07/24  Plan of Care Reviewed with: patient, significant other    Subjective     Chief Complaint: none verbalized   Patient/Family Comments/goals: get better per boyfriend  Pain/Comfort:  Pain Rating 1: other (see comments) (unable to rate)  Pain Rating Post-Intervention 1: 0/10    Objective:     Communicated with: nursing prior to session.  Patient found HOB elevated with bowel management system,  telemetry, Tracheostomy, meza catheter, oxygen, pressure relief boots, PEG Tube upon OT entry to room.    General Precautions: Standard, fall, contact    Orthopedic Precautions:N/A  Braces: N/A  Respiratory Status:  trach collar    [facility] 6 Click ADL: 6    Treatment & Education:  Patient's boyfriend educated on PROM program containing the following exercises. He demonstrated teach back understanding and verbalized understanding of completing 2x10 daily to maintain muscle/joint integrity.   - composite fist/ finger extension  -wrist flexion/extension  -elbow flexion/extension  - shoulder flexion/extension  -shoulder AB/ADduction    Patient left HOB elevated with all lines intact, call button in reach, and boyfriend present    GOALS:   Multidisciplinary Problems       Occupational Therapy Goals          Problem: Occupational Therapy    Goal Priority Disciplines Outcome Interventions   Occupational Therapy Goal     OT, PT/OT Ongoing, Progressing    Description: Goals to be met by: 3/22/2024     Patient will increase functional independence with ADLs by performing:    Grooming while EOB with Maximum Assistance.  Toileting from bed level with Maximum Assistance for hygiene and clothing management.   Sitting at edge of bed x 5 minutes with Maximum Assistance.  Rolling to Bilateral with Maximum Assistance.   Supine to sit with Maximum Assistance.                         Time Tracking:     OT Date of Treatment: 03/11/24  OT Start Time: 1236  OT Stop Time: 1254  OT Total Time (min): 18 min    Billable Minutes:Therapeutic Activity 18    OT/JOSÉ: OT          3/11/2024

## 2024-03-11 NOTE — PT/OT/SLP PROGRESS
Physical Therapy Treatment    Patient Name:  Sarah Saravia   MRN:  [id number]    Recommendations:     Discharge Recommendations: Moderate Intensity Therapy  Discharge Equipment Recommendations: lift device, hospital bed, wheelchair  Barriers to discharge:     Assessment:     Sarah Saravia is a 53 y.o. female admitted with a medical diagnosis of Sepsis.  She presents with the following impairments/functional limitations: weakness, impaired endurance, impaired self care skills, impaired functional mobility, pain, decreased lower extremity function, decreased upper extremity function, decreased coordination, impaired cognition, abnormal tone, decreased ROM, impaired fine motor, impaired skin, impaired cardiopulmonary response to activity Pt tolerated treatment session fairly well today. Attempted to roll pt, yet discovered pt is extremely tight him her LE's. PROM was performed  to B LE and PROM booklet was given to the patients boyfriend. Writing PTA educated the pt's boyfriend on the PROM movements to perform to help maximize the next PT session. Patient remains appropriate for continued skilled services within the acute environment and goals remain appropriate.   .    Rehab Prognosis: Fair; patient would benefit from acute skilled PT services to address these deficits and reach maximum level of function.    Recent Surgery: * No surgery found *      Plan:     During this hospitalization, patient to be seen 3 x/week to address the identified rehab impairments via gait training, therapeutic activities, therapeutic exercises, neuromuscular re-education, wheelchair management/training and progress toward the following goals:    Plan of Care Expires:  04/08/24    Subjective     Chief Complaint: None stated (pt is non-verbal)  Patient/Family Comments/goals: None stated (pt is non-verbal)  Pain/Comfort:  Pain Rating 1:  (pt is non-verbal; facial grimacing with ROM to BLE)  Location - Side 1: Bilateral  Location 1:  leg  Pain Addressed 1: Cessation of Activity, Nurse notified  Pain Rating Post-Intervention 1:  (at rest pt does not seem to be in pain)      Objective:     Communicated with RN prior to session.  Patient found supine with bowel management system, meza catheter, oxygen, PEG Tube, pressure relief boots, peripheral IV, Tracheostomy, telemetry upon PT entry to room.     General Precautions: Standard, contact, fall  Orthopedic Precautions: N/A  Braces: N/A  Respiratory Status:  Tracheostomy     Functional Mobility:  Bed Mobility:  Deferred  Transfers:   Deferred    PROM performed to B LE x 20 repetitions: knee flexion, hip flexion, plantar flexion, dorsiflexion, inversion, eversion, hip ABD/ADD        AM-PAC 6 CLICK MOBILITY  Turning over in bed (including adjusting bedclothes, sheets and blankets)?: 1  Sitting down on and standing up from a chair with arms (e.g., wheelchair, bedside commode, etc.): 1  Moving from lying on back to sitting on the side of the bed?: 1  Moving to and from a bed to a chair (including a wheelchair)?: 1  Need to walk in hospital room?: 1  Climbing 3-5 steps with a railing?: 1  Basic Mobility Total Score: 6       Treatment & Education:  Writing PTA educated the pt's boyfriend on the PROM movements to perform to help maximize the next PT session.    Patient left supine with all lines intact, call button in reach, RN notified, and boyfriend present..    GOALS:   Multidisciplinary Problems       Physical Therapy Goals          Problem: Physical Therapy    Goal Priority Disciplines Outcome Goal Variances Interventions   Physical Therapy Goal     PT, PT/OT Ongoing, Progressing     Description: Goals to be met by: 3/22/2024     Patient will increase functional independence with mobility by performin. Pt will tolerate full ROM x10 to B UE and LE with no withdraw or signs of significant discomfort   2. Pt will follow 3 different 1 step verbal or demonstrated commands   3. Pt will tolerate  sitting position for 5 mins with no significant signs of agitation with VSS   4. Pt will tolerate rolling L and R with total assistance with VSS                          Time Tracking:     PT Received On: 03/11/24  PT Start Time: 1221     PT Stop Time: 1235  PT Total Time (min): 14 min     Billable Minutes: Therapeutic Activity 14    Treatment Type: Treatment  PT/PTA: PTA     Number of PTA visits since last PT visit: 1 03/11/2024

## 2024-03-11 NOTE — PLAN OF CARE
03/11/24 1641   Discharge Reassessment   Assessment Type Discharge Planning Reassessment   Did the patient's condition or plan change since previous assessment? No   Discharge Plan discussed with: Spouse/sig other;Adult children   Name(s) and Number(s) Osvaldo Gallegos  Significant other  [phone]  (NEWMAN,ZAC (Daughter) [phone] (Mobile))   Communicated ZEKE with patient/caregiver Yes   Discharge Plan A Long-term acute care facility (LTAC)  ([facility] Phone: [phone])   DME Needed Upon Discharge  none   Transition of Care Barriers Mobility   Why the patient remains in the hospital Requires continued medical care   Post-Acute Status   Post-Acute Placement Status Pending payor medical review/second level review   Coverage MEDICA[employer] - [employer] -   Discharge Delays None known at this time     CM met with patients BF to discuss any changes in discharge planning.  Patients plan is to  return to  [facility] Phone: [phone]    No changes in DC plans. ZEKE: 3/12/24      Gisel Patel RN  Case Management  [facility]  [phone]

## 2024-03-11 NOTE — SUBJECTIVE & OBJECTIVE
Interval History: NAEON. RIP/ Urine culture with GNR. Blood cultures NGTD. Patient no longer with sepsis physiology. Medically stable for dc back to LTAC.    Review of Systems   Unable to perform ROS: Acuity of condition     Objective:     Vital Signs (Most Recent):  Temp: 99.6 °F (37.6 °C) (03/11/24 1422)  Pulse: 91 (03/11/24 1422)  Resp: 19 (03/11/24 0749)  BP: (!) 144/80 (03/11/24 1422)  SpO2: 100 % (03/11/24 1422) Vital Signs (24h Range):  Temp:  [98 °F (36.7 °C)-99.6 °F (37.6 °C)] 99.6 °F (37.6 °C)  Pulse:  [89-98] 91  Resp:  [17-22] 19  SpO2:  [96 %-100 %] 100 %  BP: (134-174)/(73-86) 144/80     Weight: (!) 145.1 kg (319 lb 14.2 oz)  Body mass index is 48.64 kg/m².    Intake/Output Summary (Last 24 hours) at 3/11/2024 1425  Last data filed at 3/11/2024 0650  Gross per 24 hour   Intake 1060 ml   Output 300 ml   Net 760 ml         Physical Exam  Vitals and nursing note reviewed.   Constitutional:       General: She is not in acute distress.     Appearance: She is obese. She is not toxic-appearing.      Comments: Unable to respond or follow commands   HENT:      Head: Atraumatic.   Neck:      Comments: Tracheostomy in place    Cardiovascular:      Rate and Rhythm: Normal rate and regular rhythm.      Heart sounds: Normal heart sounds.   Pulmonary:      Effort: Pulmonary effort is normal.      Breath sounds: Normal breath sounds.   Abdominal:      General: Abdomen is flat.      Tenderness: There is no abdominal tenderness.   Musculoskeletal:         General: Normal range of motion.   Skin:     General: Skin is warm and dry.      Comments: Groin ulcerations with bandages in place clean/dry   Neurological:      Mental Status: She is oriented to person, place, and time.             Significant Labs: All pertinent labs within the past 24 hours have been reviewed.  CBC:   Recent Labs   Lab 03/10/24  0659 03/11/24  0405   WBC 14.92* 12.24   HGB 8.5* 8.4*   HCT 26.8* 26.2*    376     CMP:   Recent Labs   Lab  03/10/24  0659 03/11/24  0405   * 132*   K 5.2* 4.5   CL 98 98   CO2 18* 20*   * 157*   * 115*   CREATININE 4.5* 5.5*   CALCIUM 9.6 9.8   PROT 8.4 8.2   ALBUMIN 2.5* 2.4*   BILITOT 0.2 0.2   ALKPHOS 155* 145*   AST 24 25   ALT 23 23   ANIONGAP 18* 14       Significant Imaging: I have reviewed all pertinent imaging results/findings within the past 24 hours.

## 2024-03-11 NOTE — ASSESSMENT & PLAN NOTE
Patient's anemia is currently controlled. Has not received any PRBCs to date. Etiology likely d/t chronic disease due to ESRD  Current CBC reviewed-   Lab Results   Component Value Date    HGB 8.4 (L) 03/11/2024    HCT 26.2 (L) 03/11/2024     Monitor serial CBC and transfuse if patient becomes hemodynamically unstable, symptomatic or H/H drops below 7/21.

## 2024-03-11 NOTE — ASSESSMENT & PLAN NOTE
Developed ALVARADO most likley ATN in setting of septic shock; requiring RRT during prior admission. Initially needed hemodialysis s/p tunneled PermCath placement. Showed signs of renal recovery but now worsening w/ Cr at 3.0 on admission.     - Nephrology consulted, HD initiated  - Daily Renal Function Panel   - Avoid Hypotension  - Renally dose all meds  - Please avoid nephrotoxins, including NSAIDs, aminoglycosides, IV contrast (unless absolutely necessary), gadolinium, fleets and other phosphorous-based laxatives. Caution with antibiotics.

## 2024-03-11 NOTE — SUBJECTIVE & OBJECTIVE
Interval History: no acute events overnight    Review of patient's allergies indicates:  No Known Allergies  Current Facility-Administered Medications   Medication Frequency    0.9%  NaCl infusion Once    0.9%  NaCl infusion Once    acetaminophen oral solution 650 mg Q4H PRN    albuterol-ipratropium 2.5 mg-0.5 mg/3 mL nebulizer solution 3 mL Q4H PRN    amLODIPine tablet 10 mg Daily    ascorbic acid (vitamin C) tablet 500 mg BID    carvediloL tablet 25 mg BID    chlorhexidine 0.12 % solution 15 mL BID    DAPTOmycin (CUBICIN) 1,040 mg in sodium chloride 0.9% SolP 50 mL IVPB Q48H    dextrose 10% bolus 125 mL 125 mL PRN    dextrose 10% bolus 250 mL 250 mL PRN    glucagon (human recombinant) injection 1 mg PRN    heparin (porcine) injection 1,000 Units PRN    heparin (porcine) injection 1,000 Units PRN    heparin (porcine) injection 7,500 Units Q8H    hydrALAZINE tablet 25 mg Q8H PRN    insulin aspart U-100 pen 0-10 Units Q4H PRN    insulin aspart U-100 pen 8 Units Q4H    [START ON 3/12/2024] insulin detemir U-100 (Levemir) pen 9 Units BID    labetalol 20 mg/4 mL (5 mg/mL) IV syring Q6H PRN    meropenem (MERREM) 1 g in sodium chloride 0.9 % 100 mL IVPB (MB+) Q12H    mupirocin 2 % ointment BID    naloxone 0.4 mg/mL injection 0.02 mg PRN    ondansetron injection 4 mg Q6H PRN    pantoprazole suspension 40 mg Daily    prochlorperazine injection Soln 5 mg Q6H PRN    psyllium husk (aspartame) 3.4 gram packet 1 packet Daily    sevelamer carbonate pwpk 1.6 g TID    sodium chloride 0.9% bolus 250 mL 250 mL PRN    sodium chloride 0.9% bolus 250 mL 250 mL PRN    sodium chloride 0.9% flush 10 mL Q12H PRN    zinc sulfate capsule 220 mg Daily       Objective:     Vital Signs (Most Recent):  Temp: 99.6 °F (37.6 °C) (03/11/24 1422)  Pulse: 90 (03/11/24 1428)  Resp: 19 (03/11/24 0749)  BP: (!) 155/80 (03/11/24 1428)  SpO2: 100 % (03/11/24 1422) Vital Signs (24h Range):  Temp:  [98 °F (36.7 °C)-99.6 °F (37.6 °C)] 99.6 °F (37.6 °C)  Pulse:   [89-98] 90  Resp:  [17-22] 19  SpO2:  [96 %-100 %] 100 %  BP: (134-174)/(73-86) 155/80     Weight: (!) 145.1 kg (319 lb 14.2 oz) (03/11/24 0400)  Body mass index is 48.64 kg/m².  Body surface area is 2.64 meters squared.    I/O last 3 completed shifts:  In: 2210 [NG/GT:2060; IV Piggyback:150]  Out: 450 [Urine:350; Stool:100]     Physical Exam  Vitals and nursing note reviewed.   Constitutional:       General: She is not in acute distress.     Appearance: She is obese. She is not ill-appearing or toxic-appearing.      Comments: Unable to respond or follow commands   HENT:      Head: Atraumatic.   Neck:      Comments: Tracheostomy in place    Cardiovascular:      Rate and Rhythm: Normal rate and regular rhythm.      Pulses: Normal pulses.      Heart sounds: Normal heart sounds.   Pulmonary:      Effort: Pulmonary effort is normal.      Breath sounds: Normal breath sounds.   Abdominal:      General: Abdomen is flat.      Palpations: Abdomen is soft.   Musculoskeletal:         General: Normal range of motion.   Skin:     Comments: Skin assessment - No areas of ulceration or blistering of bony prominences.    Neurological:      General: No focal deficit present.      Mental Status: She is oriented to person, place, and time.   Psychiatric:         Mood and Affect: Mood normal.         Behavior: Behavior normal.          Significant Labs:  All labs within the past 24 hours have been reviewed.     Significant Imaging:  Labs: Reviewed

## 2024-03-11 NOTE — ASSESSMENT & PLAN NOTE
53 y.o. y/o female with a PMHx of Ayaz's gangrene who presented with symptoms of fever with concerns for sepsis. Suspected source likely Cellulitis and UTI. Met SIRS criteria with RR 33, Temp 100.3, WBC 14.11. Started on Daptomycin and Meropenum.     - See Ayaz's gangrene  - Wound culture w/ pseudomonas sensitive to meropenum. ID consulted; appreciate recs   - Continue Daptomycin and meropenum  - Now with significant improvement in overall condition, pending return to LTAC

## 2024-03-11 NOTE — NURSING
Dialysis tx started to the right chest perm cath per orders placed by Dr. Stokes:    Order Questions    Question Answer   Antibiotics on HD? No   Duration of Treatment 3 hours   Dialyzer F160   Dialysate Temperature (C) 37    mL/min    mL/min   K+ 3 MEQ/L   Ca++ 2.5 MEQ/L   Na+ 140 MEQ/L   Bicarb 30 meq   Access Other (please specify)   Fluid Removal (L) 1L   Fluid Removal Instructions maintain SBP > 90 mmHG   Dialysate Bath Solution Protocol (DO NOT MODIFY ANSWER) [email or web address] Dialysate Bath Solution Algorithm (formatted with date).pdf       Contact Isolations maintained

## 2024-03-11 NOTE — ASSESSMENT & PLAN NOTE
Initially presented with DKA  A1c 10.4 on prior admission.     - Detemir 14 units daily --> Change to detemir 9U BID starting 3/12  - Novolog aspart 8units q4h while on TFs  - Moderate dose SSI PRN

## 2024-03-11 NOTE — PROGRESS NOTES
Patient escorted off unit via her bed for dialysis. Patient on oxygen 5l via trach.. No acute distress noted. Patient peripheral IV intact. No redness or swelling noted. Transported on telemetry. Awaiting patient's return.

## 2024-03-11 NOTE — ASSESSMENT & PLAN NOTE
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 After a prolonged hospital course she was discharged to LTAC on 3/7 however is now representing on 3/8 with fever, pyuria, and drainage from her posterior wound. Was seen by ID in LTAC and was started on Daptomycin and Meropenum prior to arrival.  CT A/P showed open wound of the right mons pubis with surrounding subcutaneous stranding and some trace subcutaneous gas concerning for infection.    - Surgery following. Opened wound on 3/8 and culture w/ pseudomonas sensitive to meropenem.   - Continue Daptomycin and Meropenem  - ID consulted; continue abx  - Follow up on wound cx spec and shireen, urine cx GNR, blood cx NGTD  - Wound care consulted

## 2024-03-11 NOTE — PROGRESS NOTES
[facility] - [facility] ([place] [address])  [facility] Medicine  Progress Note    Patient Name: Sarah Saravia  MRN: [id number]  Patient Class: IP- Inpatient   Admission Date: 3/7/2024  Length of Stay: 3 days  Attending Physician: Janett Jean MD  [facility]        Subjective:     Principal Problem:Sepsis        HPI:  53 y.o. female with hx of recent Ayaz's gangrene s/p surgical debridement, T2DM, AHRF s/p tracheostomy, embolic CVA, presents to the ED from LTAC with low grade fevers. 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 Hospital course was complicated by DKA, ART requiring RRT, acute respiratory failure requiring intubation and subsequent tracheostomy, and was found to have multiple infarcts on MRI brain with minimal spontaneous movements.  Palliative consulted however family wanted to proceed with trach (8.0/85 mm cuffed), PEG placement on 2/22/24. She was also evaluated by Nephrology for inability to fully concentrate urine. She was not completely anuric however due to ongoing needs for hemodialysis a tunneled PermCath was placed by Interventional Radiology on 2/29. Prior cultures w/ proteus mirabilis and bacteroides. She was treated with broad spectrum antibiotics and completed antibiotics 2/22. Patient was discharged to LTAC for wound care, therapy, respiratory and trach management on 3/5. She developed a fever 100.3 F with WBC trending up while at LTAC yesterday. She was noted to have copious malodorous purulence drainage from her wound and was started on Daptomycin and Meropenum by ID yesterday and was recommend for patient to be transferred to the ED for CT abd.    In the ED, patient fevered at 100.3, was tachycardiac, tachypneic and sating at 100% on 5 L/min and FiO2 of 28 via trach. Labs notable for WBC 14.18, Hgb 8.9, Bicarb 20, ,  Cr 2.6, Phos 6.1, CRP 27.5. UA with WBC 28/hpf, Leukocyte esterases +1 concerning for UTI. CT A/P showed open wound of the right mons pubis with surrounding subcutaneous stranding and some trace subcutaneous gas concerning for infection. Gen surgery evaluated in ED; recommended admission to hospital medicine management of her chronic conditions.     History limited by patient's condition.    Overview/Hospital Course:  Presenting from LTAC w/ low grade fevers and increased drainage from wounds. Gen surg consulted; s/p bedside debridement. On Dapto/joyce. ID and nephro consulted. Groin abscess w/ pseudomonas sensitive to meropenem. Respiratory culture and urine culture both w/ GNR pending speciation and sensitivity. Medically stable to return to LTAC.      Interval History: NAEON. RIP/ Urine culture with GNR. Blood cultures NGTD. Patient no longer with sepsis physiology. Medically stable for dc back to LTAC.    Review of Systems   Unable to perform ROS: Acuity of condition     Objective:     Vital Signs (Most Recent):  Temp: 99.6 °F (37.6 °C) (03/11/24 1422)  Pulse: 91 (03/11/24 1422)  Resp: 19 (03/11/24 0749)  BP: (!) 144/80 (03/11/24 1422)  SpO2: 100 % (03/11/24 1422) Vital Signs (24h Range):  Temp:  [98 °F (36.7 °C)-99.6 °F (37.6 °C)] 99.6 °F (37.6 °C)  Pulse:  [89-98] 91  Resp:  [17-22] 19  SpO2:  [96 %-100 %] 100 %  BP: (134-174)/(73-86) 144/80     Weight: (!) 145.1 kg (319 lb 14.2 oz)  Body mass index is 48.64 kg/m².    Intake/Output Summary (Last 24 hours) at 3/11/2024 1425  Last data filed at 3/11/2024 0650  Gross per 24 hour   Intake 1060 ml   Output 300 ml   Net 760 ml         Physical Exam  Vitals and nursing note reviewed.   Constitutional:       General: She is not in acute distress.     Appearance: She is obese. She is not toxic-appearing.      Comments: Unable to respond or follow commands   HENT:      Head: Atraumatic.   Neck:      Comments: Tracheostomy in place    Cardiovascular:      Rate and Rhythm:  Normal rate and regular rhythm.      Heart sounds: Normal heart sounds.   Pulmonary:      Effort: Pulmonary effort is normal.      Breath sounds: Normal breath sounds.   Abdominal:      General: Abdomen is flat.      Tenderness: There is no abdominal tenderness.   Musculoskeletal:         General: Normal range of motion.   Skin:     General: Skin is warm and dry.      Comments: Groin ulcerations with bandages in place clean/dry   Neurological:      Mental Status: She is oriented to person, place, and time.             Significant Labs: All pertinent labs within the past 24 hours have been reviewed.  CBC:   Recent Labs   Lab 03/10/24  0659 03/11/24  0405   WBC 14.92* 12.24   HGB 8.5* 8.4*   HCT 26.8* 26.2*    376     CMP:   Recent Labs   Lab 03/10/24  0659 03/11/24  0405   * 132*   K 5.2* 4.5   CL 98 98   CO2 18* 20*   * 157*   * 115*   CREATININE 4.5* 5.5*   CALCIUM 9.6 9.8   PROT 8.4 8.2   ALBUMIN 2.5* 2.4*   BILITOT 0.2 0.2   ALKPHOS 155* 145*   AST 24 25   ALT 23 23   ANIONGAP 18* 14       Significant Imaging: I have reviewed all pertinent imaging results/findings within the past 24 hours.    Assessment/Plan:      * Sepsis  53 y.o. y/o female with a PMHx of Ayaz's gangrene who presented with symptoms of fever with concerns for sepsis. Suspected source likely Cellulitis and UTI. Met SIRS criteria with RR 33, Temp 100.3, WBC 14.11. Started on Daptomycin and Meropenum.     - See Ayaz's gangrene  - Wound culture w/ pseudomonas sensitive to meropenum. ID consulted; appreciate recs   - Continue Daptomycin and meropenum  - Now with significant improvement in overall condition, pending return to LTAC      Hypernatremia  - see renal failure       Anemia due to chronic kidney disease  Patient's anemia is currently controlled. Has not received any PRBCs to date. Etiology likely d/t chronic disease due to ESRD  Current CBC reviewed-   Lab Results   Component Value Date    HGB 8.4 (L)  03/11/2024    HCT 26.2 (L) 03/11/2024     Monitor serial CBC and transfuse if patient becomes hemodynamically unstable, symptomatic or H/H drops below 7/21.    UTI (urinary tract infection)  UA significant for WBC >100/hpf with occasion bacteria     - Follow up Ucx w/ GNR, See sepsis      Diarrhea  Rectal tube in place     - Continue psyllium     HTN (hypertension)  - Continue Home amlodipine 10 daily and Coreg 25 BID   - Hydralazine and Labetelol PRN    Dysphagia  Likely 2/2 to CVA. S/p PEG placement. On TF at LTAC    - Nutrition consulted   - Resume Tube feeds      Severe malnutrition  Nutrition consulted. Most recent weight and BMI monitored-     Measurements:  Wt Readings from Last 1 Encounters:   03/11/24 (!) 145.1 kg (319 lb 14.2 oz)   Body mass index is 48.64 kg/m².    Patient has been screened and assessed by RD.    Malnutrition Type:  Context:    Level:      Malnutrition Characteristic Summary:       Interventions/Recommendations (treatment strategy):  1. TF RECS: Novasource Renal @40ml/hr to provide 1920 kcals, 87g PRO, 688 ml fluid w/ additonal FWF per MD 2. Monitor labs and wt      Thrombocytosis  - stable       Status post tracheostomy  S/P tracheostomy on 2/22. Per chart review, pulm considering trach exchange early next week.     - Continue trach collar as tolerated  - Wean oxygen for goal spO2>90%  - Continue duonebs prn   - Routine trach care  - Suction q4hr       Hyperphosphatemia  - see renal failure     Encephalopathy  Likely 2/2 to embolic CVA during prior admission. CTH 2/3, MRI 2/5 with scattered hypoattenuation likely representing infarcts. EEG findings consistent with moderate-severe encephalopathy.  Repeat CTH and MRI/MRA unchanged from prior exams.LP 2/16. Elevated WBCs and protein consistent with bacterial meningitis. CSF culture 2/19 with no growth    - Delirium precautions  - PT/OT/ST consults    Type 2 diabetes mellitus, with long-term current use of insulin  Initially presented with  DKA  A1c 10.4 on prior admission.     - Detemir 14 units daily --> Change to detemir 9U BID starting 3/12  - Novolog aspart 8units q4h while on TFs  - Moderate dose SSI PRN      Renal failure  Developed ALVARADO most likley ATN in setting of septic shock; requiring RRT during prior admission. Initially needed hemodialysis s/p tunneled PermCath placement. Showed signs of renal recovery but now worsening w/ Cr at 3.0 on admission.     - Nephrology consulted, HD initiated  - Daily Renal Function Panel   - Avoid Hypotension  - Renally dose all meds  - Please avoid nephrotoxins, including NSAIDs, aminoglycosides, IV contrast (unless absolutely necessary), gadolinium, fleets and other phosphorous-based laxatives. Caution with antibiotics.      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 After a prolonged hospital course she was discharged to LTAC on 3/7 however is now representing on 3/8 with fever, pyuria, and drainage from her posterior wound. Was seen by ID in LTAC and was started on Daptomycin and Meropenum prior to arrival.  CT A/P showed open wound of the right mons pubis with surrounding subcutaneous stranding and some trace subcutaneous gas concerning for infection.    - Surgery following. Opened wound on 3/8 and culture w/ pseudomonas sensitive to meropenem.   - Continue Daptomycin and Meropenem  - ID consulted; continue abx  - Follow up on wound cx spec and shireen, urine cx GNR, blood cx NGTD  - Wound care consulted       VTE Risk Mitigation (From admission, onward)           Ordered     heparin (porcine) injection 1,000 Units  As needed (PRN)         03/10/24 1122     heparin (porcine) injection 1,000 Units  As needed (PRN)          03/08/24 1715     heparin (porcine) injection 7,500 Units  Every 8 hours         03/08/24 0334                    Discharge Planning   ZEKE: 3/11/2024     Code Status: Full Code   Is the patient medically ready for discharge?: No    Reason for patient still in hospital (select all that apply): Pending disposition  Discharge Plan A: Skilled Nursing Facility          Jessie Oh MD  Department of Hospital Medicine   [facility] - Stepdown Flex ([place])

## 2024-03-11 NOTE — ASSESSMENT & PLAN NOTE
Nutrition consulted. Most recent weight and BMI monitored-     Measurements:  Wt Readings from Last 1 Encounters:   03/11/24 (!) 145.1 kg (319 lb 14.2 oz)   Body mass index is 48.64 kg/m².    Patient has been screened and assessed by RD.    Malnutrition Type:  Context:    Level:      Malnutrition Characteristic Summary:       Interventions/Recommendations (treatment strategy):  1. TF RECS: Novasource Renal @40ml/hr to provide 1920 kcals, 87g PRO, 688 ml fluid w/ additonal FWF per MD 2. Monitor labs and wt

## 2024-03-11 NOTE — ASSESSMENT & PLAN NOTE
Nonoliguric ALVARADO on baseline normal renal function  - etiology for ALVARADO 2/2 to ischemic ATN in setting of septic shock.   - was in recovery process; UOP adequate; BUN 2/2 to azotemia from elevated protein content.   - perm cath placed on 02/29/24;   - now presented with ALVARADO ~3 from ~2.4 yesterday suspect 2/2 to hypotension/prerenal insult.     Plan:  - Will plan for iHD today  - no need for scheduled diuretics; okay to use PRN for volume overload  - strict ins and outs  - renally dose all med  - avoid nephrotoxins.

## 2024-03-11 NOTE — CONSULTS
[facility] - Stepdown Flex ([place] [address])  Wound Care    Patient Name:  Sarah Saravia   MRN:  [id number]  Date: 3/11/2024  Diagnosis: Sepsis    History:     No past medical history on file.    Social History     Socioeconomic History    Marital status: Single   Tobacco Use    Smoking status: Unknown     Social Determinants of Health     Financial Resource Strain: High Risk (3/9/2024)    Overall Financial Resource Strain (CARDIA)     Difficulty of Paying Living Expenses: Very hard   Food Insecurity: No Food Insecurity (3/9/2024)    Hunger Vital Sign     Worried About Running Out of Food in the Last Year: Never true     Ran Out of Food in the Last Year: Never true   Transportation Needs: No Transportation Needs (3/9/2024)    PRAPARE - Transportation     Lack of Transportation (Medical): No     Lack of Transportation (Non-Medical): No   Physical Activity: Inactive (3/9/2024)    Exercise Vital Sign     Days of Exercise per Week: 0 days     Minutes of Exercise per Session: 0 min   Stress: Patient Unable To Answer (3/9/2024)    [employer] - Occupational Stress Questionnaire     Feeling of Stress : Patient unable to answer   Social Connections: Socially Isolated (3/9/2024)    Social Connection and Isolation Panel [NHANES]     Frequency of Communication with Friends and Family: More than three times a week     Frequency of Social Gatherings with Friends and Family: More than three times a week     Attends [employer] Services: Never     Active Member of Clubs or Organizations: No     Attends Club or Organization Meetings: Never     Marital Status: Never    Housing Stability: High Risk (3/9/2024)    Housing Stability Vital Sign     Unable to Pay for Housing in the Last Year: Yes     Unstable Housing in the Last Year: No       Precautions:     Allergies as of 03/07/2024    (No Known Allergies)       [facility] Assessment Details/Treatment   Patient seen for wound care consultation.   Reviewed chart  for this encounter.   See Flow Sheet for findings.    Incision site to the right groin with a pink, red and moist wound bed. Granulation tissue present. The removed dressing was saturated in serosanguinous and green drainage. The wound was cleaned with NS, gauze moistened with NS loosely packed into the wound bed, covered with dry gauze and secured with an island dressing.     RECOMMENDATIONS:  - Right groin: bedside nursing to cleanse with NS, pat dry, loosely pack gauze moistened with dakins into the wound bed, cover with dry gauze and secure with a dry dressing bid/prn  - Turning every 2 hours  - Heel protecting boots  - Bariatric immerse  - Bedside nursing to maintain pressure injury prevention interventions     03/11/24 1104        Incision/Site 02/06/24 1139 Right Groin   Date First Assessed/Time First Assessed: 02/06/24 1139   Present Prior to Hospital Arrival?: (c) Yes  Side: Right  Location: Groin  Additional Comments: WOUND VAC DRESSING; NO LAYERS CLOSED   Wound Image    Incision WDL ex   Dressing Appearance Intact;Moist drainage   Drainage Amount Small   Drainage Characteristics/Odor Green;Serous;Tan   Appearance Pink;Red;Moist;Granulating   Periwound Area Intact;Dry   Wound Edges Open   Wound Length (cm) 1 cm   Wound Width (cm) 9.4 cm   Wound Depth (cm) 2.3 cm   Wound Volume (cm^3) 21.62 cm^3   Wound Surface Area (cm^2) 9.4 cm^2   Care Cleansed with:;Sterile normal saline   Dressing Applied;Gauze, wet to moist;Gauze;Island/border       Recommendations made to primary team for above plan via secure chat. Orders placed. Wound care will follow-up as needed.     03/11/2024

## 2024-03-12 VITALS
RESPIRATION RATE: 18 BRPM | DIASTOLIC BLOOD PRESSURE: 86 MMHG | HEIGHT: 68 IN | TEMPERATURE: 99 F | OXYGEN SATURATION: 98 % | WEIGHT: 293 LBS | HEART RATE: 98 BPM | SYSTOLIC BLOOD PRESSURE: 149 MMHG | BODY MASS INDEX: 44.41 KG/M2

## 2024-03-12 LAB
ALBUMIN SERPL BCP-MCNC: 2.4 G/DL (ref 3.5–5.2)
ALP SERPL-CCNC: 159 U/L (ref 55–135)
ALT SERPL W/O P-5'-P-CCNC: 27 U/L (ref 10–44)
ANION GAP SERPL CALC-SCNC: 15 MMOL/L (ref 8–16)
AST SERPL-CCNC: 44 U/L (ref 10–40)
BACTERIA BLD CULT: NORMAL
BACTERIA BLD CULT: NORMAL
BACTERIA SPEC ANAEROBE CULT: NORMAL
BACTERIA UR CULT: ABNORMAL
BASOPHILS # BLD AUTO: 0.03 K/UL (ref 0–0.2)
BASOPHILS NFR BLD: 0.3 % (ref 0–1.9)
BILIRUB SERPL-MCNC: 0.2 MG/DL (ref 0.1–1)
BUN SERPL-MCNC: 61 MG/DL (ref 6–20)
CALCIUM SERPL-MCNC: 9.6 MG/DL (ref 8.7–10.5)
CHLORIDE SERPL-SCNC: 101 MMOL/L (ref 95–110)
CO2 SERPL-SCNC: 20 MMOL/L (ref 23–29)
CREAT SERPL-MCNC: 3.7 MG/DL (ref 0.5–1.4)
DIFFERENTIAL METHOD BLD: ABNORMAL
EOSINOPHIL # BLD AUTO: 1 K/UL (ref 0–0.5)
EOSINOPHIL NFR BLD: 8.9 % (ref 0–8)
ERYTHROCYTE [DISTWIDTH] IN BLOOD BY AUTOMATED COUNT: 15.7 % (ref 11.5–14.5)
EST. GFR  (NO RACE VARIABLE): 14 ML/MIN/1.73 M^2
GLUCOSE SERPL-MCNC: 134 MG/DL (ref 70–110)
HCT VFR BLD AUTO: 25.2 % (ref 37–48.5)
HGB BLD-MCNC: 8 G/DL (ref 12–16)
IMM GRANULOCYTES # BLD AUTO: 0.08 K/UL (ref 0–0.04)
IMM GRANULOCYTES NFR BLD AUTO: 0.7 % (ref 0–0.5)
LYMPHOCYTES # BLD AUTO: 2.3 K/UL (ref 1–4.8)
LYMPHOCYTES NFR BLD: 20.7 % (ref 18–48)
MAGNESIUM SERPL-MCNC: 1.9 MG/DL (ref 1.6–2.6)
MCH RBC QN AUTO: 28.7 PG (ref 27–31)
MCHC RBC AUTO-ENTMCNC: 31.7 G/DL (ref 32–36)
MCV RBC AUTO: 90 FL (ref 82–98)
MONOCYTES # BLD AUTO: 1 K/UL (ref 0.3–1)
MONOCYTES NFR BLD: 8.7 % (ref 4–15)
NEUTROPHILS # BLD AUTO: 6.7 K/UL (ref 1.8–7.7)
NEUTROPHILS NFR BLD: 60.7 % (ref 38–73)
NRBC BLD-RTO: 0 /100 WBC
PHOSPHATE SERPL-MCNC: 3.2 MG/DL (ref 2.7–4.5)
PLATELET # BLD AUTO: 324 K/UL (ref 150–450)
PMV BLD AUTO: 10.9 FL (ref 9.2–12.9)
POCT GLUCOSE: 120 MG/DL (ref 70–110)
POCT GLUCOSE: 134 MG/DL (ref 70–110)
POCT GLUCOSE: 135 MG/DL (ref 70–110)
POCT GLUCOSE: 145 MG/DL (ref 70–110)
POCT GLUCOSE: 151 MG/DL (ref 70–110)
POCT GLUCOSE: 152 MG/DL (ref 70–110)
POCT GLUCOSE: 155 MG/DL (ref 70–110)
POTASSIUM SERPL-SCNC: 4 MMOL/L (ref 3.5–5.1)
PROT SERPL-MCNC: 8.1 G/DL (ref 6–8.4)
RBC # BLD AUTO: 2.79 M/UL (ref 4–5.4)
SODIUM SERPL-SCNC: 136 MMOL/L (ref 136–145)
WBC # BLD AUTO: 11.06 K/UL (ref 3.9–12.7)

## 2024-03-12 PROCEDURE — 27201109 HC SYSTEM FECAL MANAGEMENT

## 2024-03-12 PROCEDURE — 63600175 PHARM REV CODE 636 W HCPCS: Performed by: HOSPITALIST

## 2024-03-12 PROCEDURE — 25000003 PHARM REV CODE 250

## 2024-03-12 PROCEDURE — 36415 COLL VENOUS BLD VENIPUNCTURE: CPT

## 2024-03-12 PROCEDURE — 63600175 PHARM REV CODE 636 W HCPCS

## 2024-03-12 PROCEDURE — 84100 ASSAY OF PHOSPHORUS: CPT

## 2024-03-12 PROCEDURE — 25000003 PHARM REV CODE 250: Performed by: HOSPITALIST

## 2024-03-12 PROCEDURE — 85025 COMPLETE CBC W/AUTO DIFF WBC: CPT

## 2024-03-12 PROCEDURE — 94761 N-INVAS EAR/PLS OXIMETRY MLT: CPT

## 2024-03-12 PROCEDURE — 83735 ASSAY OF MAGNESIUM: CPT

## 2024-03-12 PROCEDURE — 27000221 HC OXYGEN, UP TO 24 HOURS

## 2024-03-12 PROCEDURE — 27200966 HC CLOSED SUCTION SYSTEM

## 2024-03-12 PROCEDURE — 99900026 HC AIRWAY MAINTENANCE (STAT)

## 2024-03-12 PROCEDURE — 25000242 PHARM REV CODE 250 ALT 637 W/ HCPCS

## 2024-03-12 PROCEDURE — 80053 COMPREHEN METABOLIC PANEL: CPT

## 2024-03-12 PROCEDURE — 99900035 HC TECH TIME PER 15 MIN (STAT)

## 2024-03-12 RX ORDER — CARVEDILOL 25 MG/1
25 TABLET ORAL 2 TIMES DAILY
Status: CANCELLED | OUTPATIENT
Start: 2024-03-12

## 2024-03-12 RX ORDER — ONDANSETRON HYDROCHLORIDE 2 MG/ML
4 INJECTION, SOLUTION INTRAVENOUS EVERY 6 HOURS PRN
Status: CANCELLED | OUTPATIENT
Start: 2024-03-12

## 2024-03-12 RX ORDER — ZINC SULFATE 50(220)MG
220 CAPSULE ORAL DAILY
Status: CANCELLED | OUTPATIENT
Start: 2024-03-13

## 2024-03-12 RX ORDER — HEPARIN SODIUM 5000 [USP'U]/ML
7500 INJECTION, SOLUTION INTRAVENOUS; SUBCUTANEOUS EVERY 8 HOURS
Status: CANCELLED | OUTPATIENT
Start: 2024-03-12

## 2024-03-12 RX ORDER — ACETAMINOPHEN 650 MG/20.3ML
650 LIQUID ORAL EVERY 4 HOURS PRN
Status: CANCELLED | OUTPATIENT
Start: 2024-03-12

## 2024-03-12 RX ORDER — IPRATROPIUM BROMIDE AND ALBUTEROL SULFATE 2.5; .5 MG/3ML; MG/3ML
3 SOLUTION RESPIRATORY (INHALATION) EVERY 4 HOURS PRN
Status: CANCELLED | OUTPATIENT
Start: 2024-03-12

## 2024-03-12 RX ORDER — PROCHLORPERAZINE EDISYLATE 5 MG/ML
5 INJECTION INTRAMUSCULAR; INTRAVENOUS EVERY 6 HOURS PRN
Status: CANCELLED | OUTPATIENT
Start: 2024-03-12

## 2024-03-12 RX ORDER — MUPIROCIN 20 MG/G
OINTMENT TOPICAL 2 TIMES DAILY
Status: CANCELLED | OUTPATIENT
Start: 2024-03-12 | End: 2024-03-13

## 2024-03-12 RX ORDER — HEPARIN SODIUM 1000 [USP'U]/ML
1000 INJECTION, SOLUTION INTRAVENOUS; SUBCUTANEOUS
Status: CANCELLED | OUTPATIENT
Start: 2024-03-12

## 2024-03-12 RX ORDER — LABETALOL HCL 20 MG/4 ML
10 SYRINGE (ML) INTRAVENOUS EVERY 6 HOURS PRN
Status: CANCELLED | OUTPATIENT
Start: 2024-03-12

## 2024-03-12 RX ORDER — PANTOPRAZOLE SODIUM 40 MG/1
40 FOR SUSPENSION ORAL DAILY
Status: CANCELLED | OUTPATIENT
Start: 2024-03-13

## 2024-03-12 RX ORDER — INSULIN ASPART 100 [IU]/ML
8 INJECTION, SOLUTION INTRAVENOUS; SUBCUTANEOUS EVERY 4 HOURS
Status: CANCELLED | OUTPATIENT
Start: 2024-03-12

## 2024-03-12 RX ORDER — CHLORHEXIDINE GLUCONATE ORAL RINSE 1.2 MG/ML
15 SOLUTION DENTAL 2 TIMES DAILY
Status: CANCELLED | OUTPATIENT
Start: 2024-03-12

## 2024-03-12 RX ORDER — SEVELAMER CARBONATE FOR ORAL SUSPENSION 800 MG/1
1.6 POWDER, FOR SUSPENSION ORAL 3 TIMES DAILY
Status: CANCELLED | OUTPATIENT
Start: 2024-03-12

## 2024-03-12 RX ORDER — AMLODIPINE BESYLATE 10 MG/1
10 TABLET ORAL DAILY
Status: CANCELLED | OUTPATIENT
Start: 2024-03-13

## 2024-03-12 RX ORDER — INSULIN ASPART 100 [IU]/ML
0-10 INJECTION, SOLUTION INTRAVENOUS; SUBCUTANEOUS EVERY 4 HOURS PRN
Status: CANCELLED | OUTPATIENT
Start: 2024-03-12

## 2024-03-12 RX ORDER — SODIUM CHLORIDE 0.9 % (FLUSH) 0.9 %
10 SYRINGE (ML) INJECTION EVERY 12 HOURS PRN
Status: CANCELLED | OUTPATIENT
Start: 2024-03-12

## 2024-03-12 RX ORDER — ASCORBIC ACID 500 MG
500 TABLET ORAL 2 TIMES DAILY
Status: CANCELLED | OUTPATIENT
Start: 2024-03-12

## 2024-03-12 RX ORDER — NALOXONE HCL 0.4 MG/ML
0.02 VIAL (ML) INJECTION
Status: CANCELLED | OUTPATIENT
Start: 2024-03-12

## 2024-03-12 RX ORDER — GLUCAGON 1 MG
1 KIT INJECTION
Status: CANCELLED | OUTPATIENT
Start: 2024-03-12

## 2024-03-12 RX ORDER — HYDRALAZINE HYDROCHLORIDE 25 MG/1
25 TABLET, FILM COATED ORAL EVERY 8 HOURS PRN
Status: CANCELLED | OUTPATIENT
Start: 2024-03-12

## 2024-03-12 RX ADMIN — MUPIROCIN: 20 OINTMENT TOPICAL at 11:03

## 2024-03-12 RX ADMIN — AMLODIPINE BESYLATE 10 MG: 10 TABLET ORAL at 10:03

## 2024-03-12 RX ADMIN — INSULIN ASPART 2 UNITS: 100 INJECTION, SOLUTION INTRAVENOUS; SUBCUTANEOUS at 05:03

## 2024-03-12 RX ADMIN — INSULIN ASPART 8 UNITS: 100 INJECTION, SOLUTION INTRAVENOUS; SUBCUTANEOUS at 05:03

## 2024-03-12 RX ADMIN — Medication 500 MG: at 10:03

## 2024-03-12 RX ADMIN — HEPARIN SODIUM 7500 UNITS: 5000 INJECTION INTRAVENOUS; SUBCUTANEOUS at 02:03

## 2024-03-12 RX ADMIN — ZINC SULFATE 220 MG (50 MG) CAPSULE 220 MG: CAPSULE at 10:03

## 2024-03-12 RX ADMIN — SEVELAMER CARBONATE 1.6 G: 800 POWDER, FOR SUSPENSION ORAL at 02:03

## 2024-03-12 RX ADMIN — MEROPENEM 1 G: 1 INJECTION, POWDER, FOR SOLUTION INTRAVENOUS at 05:03

## 2024-03-12 RX ADMIN — SEVELAMER CARBONATE 1.6 G: 800 POWDER, FOR SUSPENSION ORAL at 10:03

## 2024-03-12 RX ADMIN — INSULIN ASPART 8 UNITS: 100 INJECTION, SOLUTION INTRAVENOUS; SUBCUTANEOUS at 02:03

## 2024-03-12 RX ADMIN — HEPARIN SODIUM 7500 UNITS: 5000 INJECTION INTRAVENOUS; SUBCUTANEOUS at 05:03

## 2024-03-12 RX ADMIN — INSULIN ASPART 8 UNITS: 100 INJECTION, SOLUTION INTRAVENOUS; SUBCUTANEOUS at 10:03

## 2024-03-12 RX ADMIN — MEROPENEM 500 MG: 500 INJECTION INTRAVENOUS at 05:03

## 2024-03-12 RX ADMIN — CHLORHEXIDINE GLUCONATE 0.12% ORAL RINSE 15 ML: 1.2 LIQUID ORAL at 11:03

## 2024-03-12 RX ADMIN — CARVEDILOL 25 MG: 25 TABLET, FILM COATED ORAL at 10:03

## 2024-03-12 RX ADMIN — PSYLLIUM HUSK 1 PACKET: 3.4 POWDER ORAL at 10:03

## 2024-03-12 RX ADMIN — PANTOPRAZOLE SODIUM 40 MG: 40 GRANULE, DELAYED RELEASE ORAL at 10:03

## 2024-03-12 NOTE — PLAN OF CARE
03/12/24 1610   Post-Acute Status   Post-Acute Authorization Placement   Post-Acute Placement Status Set-up Complete/Auth obtained   Coverage MEDICAID - [employer]   Patient choice form signed by patient/caregiver List from System Post-Acute Care   Discharge Delays None known at this time   Discharge Plan   Discharge Plan A Long-term acute care facility (LTAC)  ([facility] Phone: [phone])     CM spoke  with patients  daughter and significant other  to discuss any changes in discharge planning.  Patients plan is to return to  [facility] Phone: [phone]    No changes in DC plans. ZEKE: 3/12/24.    Transportation scheduled for a 1800       Gisel Patel RN  Case Management  [facility]  [phone]

## 2024-03-12 NOTE — PROGRESS NOTES
[facility] ([place] [address])  [facility]  Progress Note    Patient Name: Sarah Saravia  MRN: [id number]  Patient Class: IP- Inpatient   Admission Date: 3/7/2024  Length of Stay: 4 days  Attending Physician: Janett Jean MD  [facility]        Subjective:     Principal Problem:Sepsis        HPI:  53 y.o. female with hx of recent Ayaz's gangrene s/p surgical debridement, T2DM, AHRF s/p tracheostomy, embolic CVA, presents to the ED from LTAC with low grade fevers. 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 Hospital course was complicated by DKA, ART requiring RRT, acute respiratory failure requiring intubation and subsequent tracheostomy, and was found to have multiple infarcts on MRI brain with minimal spontaneous movements.  Palliative consulted however family wanted to proceed with trach (8.0/85 mm cuffed), PEG placement on 2/22/24. She was also evaluated by Nephrology for inability to fully concentrate urine. She was not completely anuric however due to ongoing needs for hemodialysis a tunneled PermCath was placed by Interventional Radiology on 2/29. Prior cultures w/ proteus mirabilis and bacteroides. She was treated with broad spectrum antibiotics and completed antibiotics 2/22. Patient was discharged to LT for wound care, therapy, respiratory and trach management on 3/5. She developed a fever 100.3 F with WBC trending up while at LTAC yesterday. She was noted to have copious malodorous purulence drainage from her wound and was started on Daptomycin and Meropenum by ID yesterday and was recommend for patient to be transferred to the ED for CT abd.    In the ED, patient fevered at 100.3, was tachycardiac, tachypneic and sating at 100% on 5 L/min and FiO2 of 28 via trach. Labs notable for WBC 14.18, Hgb 8.9, Bicarb 20, ,  Cr 2.6, Phos 6.1, CRP 27.5. UA with WBC 28/hpf, Leukocyte esterases +1 concerning for UTI. CT A/P showed open wound of the right mons pubis with surrounding subcutaneous stranding and some trace subcutaneous gas concerning for infection. Gen surgery evaluated in ED; recommended admission to hospital medicine management of her chronic conditions.     History limited by patient's condition.    Overview/Hospital Course:  Presenting from LTAC w/ low grade fevers and increased drainage from wounds. Gen surg consulted; s/p bedside debridement. On Dapto/joyce. ID and nephro consulted. Groin abscess w/ pseudomonas sensitive to meropenem. Respiratory culture growing pseudomonas and urine culture w/ GNR pending speciation and sensitivity. Dapto discontinued. Will need meropenem through 3/21. Medically stable to return to LTAC.      Interval History: NAEON. Respiratory and lisa with pseudomonas susceptible to meropenem. ID recommending DC dapto. Will need meropenem through 3/21. Stable for dc to LTAC.    Review of Systems   Unable to perform ROS: Acuity of condition     Objective:     Vital Signs (Most Recent):  Temp: 98.1 °F (36.7 °C) (03/12/24 0442)  Pulse: 96 (03/12/24 0507)  Resp: 18 (03/12/24 0507)  BP: (!) 176/83 (03/12/24 0442)  SpO2: 98 % (03/12/24 0507) Vital Signs (24h Range):  Temp:  [98.1 °F (36.7 °C)-99.6 °F (37.6 °C)] 98.1 °F (36.7 °C)  Pulse:  [88-98] 96  Resp:  [18-20] 18  SpO2:  [97 %-100 %] 98 %  BP: (120-176)/(67-85) 176/83     Weight: (!) 141.5 kg (311 lb 15.2 oz)  Body mass index is 47.43 kg/m².    Intake/Output Summary (Last 24 hours) at 3/12/2024 0843  Last data filed at 3/12/2024 0600  Gross per 24 hour   Intake 1800 ml   Output 1827 ml   Net -27 ml         Physical Exam  Vitals and nursing note reviewed.   Constitutional:       General: She is not in acute distress.     Appearance: She is obese. She is not toxic-appearing.      Comments: Unable to respond or follow commands   HENT:      Head:  Atraumatic. Small black scarring noted on forehead, appeared in late January per family.  Neck:      Comments: Tracheostomy in place    Cardiovascular:      Rate and Rhythm: Normal rate and regular rhythm.      Heart sounds: Normal heart sounds.   Pulmonary:      Effort: Pulmonary effort is normal.      Breath sounds: Normal breath sounds.   Abdominal:      General: Abdomen is flat.      Tenderness: There is no abdominal tenderness.   Musculoskeletal:         General: Normal range of motion.   Skin:     General: Skin is warm and dry.      Comments: Groin ulcerations with bandages in place clean/dry   Neurological:      Mental Status: She is oriented to person, place, and time.             Significant Labs: All pertinent labs within the past 24 hours have been reviewed.  CBC:   Recent Labs   Lab 03/11/24  0405 03/12/24  0345   WBC 12.24 11.06   HGB 8.4* 8.0*   HCT 26.2* 25.2*    324     CMP:   Recent Labs   Lab 03/11/24  0405 03/12/24  0345   * 136   K 4.5 4.0   CL 98 101   CO2 20* 20*   * 134*   * 61*   CREATININE 5.5* 3.7*   CALCIUM 9.8 9.6   PROT 8.2 8.1   ALBUMIN 2.4* 2.4*   BILITOT 0.2 0.2   ALKPHOS 145* 159*   AST 25 44*   ALT 23 27   ANIONGAP 14 15       Significant Imaging: I have reviewed all pertinent imaging results/findings within the past 24 hours.    Assessment/Plan:      * Sepsis  53 y.o. y/o female with a PMHx of Ayaz's gangrene who presented with symptoms of fever with concerns for sepsis. Suspected source likely Cellulitis and UTI. Met SIRS criteria with RR 33, Temp 100.3, WBC 14.11. Started on Daptomycin and Meropenum.     - See Ayaz's gangrene  - Wound culture w/ pseudomonas sensitive to meropenum. ID consulted; appreciate recs   - Continue meropenum  - Now with significant improvement in overall condition, pending return to LTAC      Hypernatremia  - see renal failure       Anemia due to chronic kidney disease  Patient's anemia is currently controlled. Has not  received any PRBCs to date. Etiology likely d/t chronic disease due to ESRD  Current CBC reviewed-   Lab Results   Component Value Date    HGB 8.0 (L) 03/12/2024    HCT 25.2 (L) 03/12/2024     Monitor serial CBC and transfuse if patient becomes hemodynamically unstable, symptomatic or H/H drops below 7/21.    UTI (urinary tract infection)  UA significant for WBC >100/hpf with occasion bacteria     - Follow up Ucx w/ GNR, See sepsis      Diarrhea  Rectal tube in place     - Continue psyllium     HTN (hypertension)  - Continue Home amlodipine 10 daily and Coreg 25 BID   - Hydralazine and Labetelol PRN    Dysphagia  Likely 2/2 to CVA. S/p PEG placement. On TF at LTAC    - Nutrition consulted   - Resume Tube feeds      Severe malnutrition  Nutrition consulted. Most recent weight and BMI monitored-     Measurements:  Wt Readings from Last 1 Encounters:   03/12/24 (!) 141.5 kg (311 lb 15.2 oz)   Body mass index is 47.43 kg/m².    Patient has been screened and assessed by RD.    Malnutrition Type:  Context:    Level:      Malnutrition Characteristic Summary:       Interventions/Recommendations (treatment strategy):  1. TF RECS: Novasource Renal @40ml/hr to provide 1920 kcals, 87g PRO, 688 ml fluid w/ additonal FWF per MD 2. Monitor labs and wt      Thrombocytosis  - stable       Status post tracheostomy  S/P tracheostomy on 2/22. Per chart review, pulm considering trach exchange early next week.     - Continue trach collar as tolerated  - Wean oxygen for goal spO2>90%  - Continue duonebs prn   - Routine trach care  - Suction q4hr       Hyperphosphatemia  - see renal failure     Encephalopathy  Likely 2/2 to embolic CVA during prior admission. CTH 2/3, MRI 2/5 with scattered hypoattenuation likely representing infarcts. EEG findings consistent with moderate-severe encephalopathy.  Repeat CTH and MRI/MRA unchanged from prior exams.LP 2/16. Elevated WBCs and protein consistent with bacterial meningitis. CSF culture 2/19 with  no growth    - Delirium precautions  - PT/OT/ST consults    Type 2 diabetes mellitus, with long-term current use of insulin  Initially presented with DKA  A1c 10.4 on prior admission.     - Detemir 14 units daily --> Change to detemir 9U BID starting 3/12  - Novolog aspart 8 units q4h while on TFs  - Moderate dose SSI PRN      Renal failure  Developed ALVARADO most likley ATN in setting of septic shock; requiring RRT during prior admission. Initially needed hemodialysis s/p tunneled PermCath placement. Showed signs of renal recovery but now worsening w/ Cr at 3.0 on admission.     - Nephrology consulted, HD initiated  - Daily Renal Function Panel   - Avoid Hypotension  - Renally dose all meds  - Please avoid nephrotoxins, including NSAIDs, aminoglycosides, IV contrast (unless absolutely necessary), gadolinium, fleets and other phosphorous-based laxatives. Caution with antibiotics.      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 After a prolonged hospital course she was discharged to LTAC on 3/7 however is now representing on 3/8 with fever, pyuria, and drainage from her posterior wound. Was seen by ID in LTAC and was started on Daptomycin and Meropenum prior to arrival.  CT A/P showed open wound of the right mons pubis with surrounding subcutaneous stranding and some trace subcutaneous gas concerning for infection.    - Surgery following. Opened wound on 3/8 and culture w/ pseudomonas sensitive to meropenem.   - ID consulted; continue meropenem (3/21 end date) and DC dapto  - Respiratory culture growing pseudomonas sensitive to meropenem  - Urine cultures GNR pending speciation/shireen  - Blood cx NGTD  - Wound care consulted       VTE  Risk Mitigation (From admission, onward)           Ordered     heparin (porcine) injection 1,000 Units  As needed (PRN)         03/10/24 1122     heparin (porcine) injection 1,000 Units  As needed (PRN)         03/08/24 1715     heparin (porcine) injection 7,500 Units  Every 8 hours         03/08/24 0334                    Discharge Planning   ZEKE: 3/12/2024     Code Status: Full Code   Is the patient medically ready for discharge?: Yes    Reason for patient still in hospital (select all that apply): Pending disposition  Discharge Plan A: Long-term acute care facility (LTAC) ([facility] Phone: [phone])   Discharge Delays: None known at this time              Jessie Oh MD  Department of Hospital Medicine   [facility] - [facility]down [facility] ([place]

## 2024-03-12 NOTE — ASSESSMENT & PLAN NOTE
53 y.o. y/o female with a PMHx of Ayaz's gangrene who presented with symptoms of fever with concerns for sepsis. Suspected source likely Cellulitis and UTI. Met SIRS criteria with RR 33, Temp 100.3, WBC 14.11. Started on Daptomycin and Meropenum.     - See Ayaz's gangrene  - Wound culture w/ pseudomonas sensitive to meropenum. ID consulted; appreciate recs   - Continue meropenum  - Now with significant improvement in overall condition, pending return to LTAC

## 2024-03-12 NOTE — PLAN OF CARE
Patient non verbal. Patient sent to dialysis. 3 hrs treatment. 1l fluid redrew. Bp 130/85. Blood sugar 142. Due meds administered. Patient stable. No concern at this time.      Problem: Adult Inpatient Plan of Care  Goal: Plan of Care Review  Outcome: Ongoing, Progressing  Goal: Patient-Specific Goal (Individualized)  Outcome: Ongoing, Progressing  Goal: Absence of Hospital-Acquired Illness or Injury  Outcome: Ongoing, Progressing  Goal: Optimal Comfort and Wellbeing  Outcome: Ongoing, Progressing  Goal: Readiness for Transition of Care  Outcome: Ongoing, Progressing

## 2024-03-12 NOTE — SUBJECTIVE & OBJECTIVE
Interval History: Remains AF, vss, hds, wbc normalized. On dapto / merrem. Plans for return to [facility]. No plans for gen surg intervention this admission.     Review of Systems  Objective:     Vital Signs (Most Recent):  Temp: 98.3 °F (36.8 °C) (03/11/24 1736)  Pulse: 92 (03/11/24 1736)  Resp: 19 (03/11/24 1600)  BP: 138/85 (03/11/24 1736)  SpO2: 100 % (03/11/24 1736) Vital Signs (24h Range):  Temp:  [98 °F (36.7 °C)-99.6 °F (37.6 °C)] 98.3 °F (36.8 °C)  Pulse:  [89-98] 92  Resp:  [17-22] 19  SpO2:  [96 %-100 %] 100 %  BP: (120-174)/(67-85) 138/85     Weight: (!) 145.1 kg (319 lb 14.2 oz)  Body mass index is 48.64 kg/m².    Estimated Creatinine Clearance: 18 mL/min (A) (based on SCr of 5.5 mg/dL (H)).     Physical Exam     Significant Labs: Blood Culture:   Recent Labs   Lab 01/29/24  2118 03/07/24  1115 03/07/24  1130 03/07/24  1500 03/07/24  1501   LABBLOO No Growth after 4 days. No Growth to date  No Growth to date  No Growth to date  No Growth to date  No Growth to date No Growth to date  No Growth to date  No Growth to date  No Growth to date  No Growth to date No Growth to date  No Growth to date  No Growth to date  No Growth to date No Growth to date  No Growth to date  No Growth to date  No Growth to date     CBC:   Recent Labs   Lab 03/10/24  0659 03/11/24  0405   WBC 14.92* 12.24   HGB 8.5* 8.4*   HCT 26.8* 26.2*    376     CMP:   Recent Labs   Lab 03/10/24  0659 03/11/24  0405   * 132*   K 5.2* 4.5   CL 98 98   CO2 18* 20*   * 157*   * 115*   CREATININE 4.5* 5.5*   CALCIUM 9.6 9.8   PROT 8.4 8.2   ALBUMIN 2.5* 2.4*   BILITOT 0.2 0.2   ALKPHOS 155* 145*   AST 24 25   ALT 23 23   ANIONGAP 18* 14     Microbiology Results (last 7 days)       Procedure Component Value Units Date/Time    Aerobic culture [[id number]]  (Abnormal)  (Susceptibility) Collected: 03/08/24 0543    Order Status: Completed Specimen: Abscess from Groin Updated: 03/11/24 1054     Aerobic  Bacterial Culture PSEUDOMONAS AERUGINOSA  Few  Skin tonja also present      Culture, Respiratory with Gram Stain [[id number]]  (Abnormal)  (Susceptibility) Collected: 03/08/24 0403    Order Status: Completed Specimen: Respiratory from Tracheal Aspirate Updated: 03/11/24 0919     Respiratory Culture No S aureus isolated.      PSEUDOMONAS AERUGINOSA  Moderate  Normal respiratory tonja also present       Gram Stain (Respiratory) >10 epithelial cells per low power field     Gram Stain (Respiratory) Many WBC's     Gram Stain (Respiratory) No organisms seen    Urine culture [[id number]]  (Abnormal) Collected: 03/07/24 1722    Order Status: Completed Specimen: Urine Updated: 03/11/24 0834     Urine Culture, Routine GRAM NEGATIVE CHRISTIAN  10,000 - 49,999 cfu/ml  Identification and susceptibility pending      Narrative:      Specimen Source->Urine    Blood culture x two cultures. Draw prior to antibiotics. [[id number]] Collected: 03/07/24 1501    Order Status: Completed Specimen: Blood from Peripheral, Forearm, Right Updated: 03/10/24 2012     Blood Culture, Routine No Growth to date      No Growth to date      No Growth to date      No Growth to date    Narrative:      Aerobic and anaerobic    Blood culture x two cultures. Draw prior to antibiotics. [[id number]] Collected: 03/07/24 1500    Order Status: Completed Specimen: Blood from Peripheral, Hand, Right Updated: 03/10/24 2012     Blood Culture, Routine No Growth to date      No Growth to date      No Growth to date      No Growth to date    Narrative:      Aerobic and anaerobic    Culture, Anaerobe [[id number]] Collected: 03/08/24 0643    Order Status: Completed Specimen: Abscess from Groin Updated: 03/09/24 1321     Anaerobic Culture Culture in progress    Aerobic culture [[id number]]     Order Status: Canceled Specimen: Abscess from Groin           Pathology Results  (Last 10 years)                 01/31/24 1140  Specimen to Pathology, Surgery General Surgery Edited  Result - FINAL    Narrative:  Pre-op Diagnosis: 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]   Procedure(s):   EXPLORATION, WOUND   INSERTION, CATHETER, TRIPLE LUMEN, HEMODIALYSIS, TEMPORARY   Number of specimens: 1   Name of specimens: Right Necrotic Groin Mass   Which provider would you like to cc?->ROSENDA PERES S   Release to patient->Immediate   Specimen total (fresh, frozen, permanent):->1             Respiratory Culture:   Recent Labs   Lab 03/08/24  0403   GSRESP >10 epithelial cells per low power field  Many WBC's  No organisms seen   RESPIRATORYC No S aureus isolated.  PSEUDOMONAS AERUGINOSA  Moderate  Normal respiratory tonja also present  *     Urine Culture:   Recent Labs   Lab 02/14/24  1249 02/16/24  1214 03/07/24  1330 03/07/24  1722   LABURIN BURKHOLDERIA SPECIES  >100,000 cfu/ml  Further identified as B. multivorans  *  Multiple organisms isolated. None in predominance.  Repeat if  clinically necessary. CHRYSEOBACTERIUM INDOLOGENES  > 100,000 cfu/ml  * Multiple organisms isolated. None in predominance.  Repeat if  clinically necessary. GRAM NEGATIVE CHRISTIAN  10,000 - 49,999 cfu/ml  Identification and susceptibility pending  *     Urine Studies:   Recent Labs   Lab 01/30/24  0415 02/14/24  1249 03/07/24  1822   COLORU Orange*   < > Yellow   APPEARANCEUA Cloudy*   < > Hazy*   PHUR 6.0   < > 6.0   SPECGRAV 1.020   < > 1.020   PROTEINUA 2+*   < > 2+*   GLUCUA 4+*   < > Negative   KETONESU Trace*   < > Negative   BILIRUBINUA Negative   < > Negative   OCCULTUA 3+*   < > 3+*   NITRITE Negative   < > Negative   UROBILINOGEN Negative  --   --    LEUKOCYTESUR Negative   < > 1+*   RBCUA 74*   < > >100*   WBCUA 2   < > 28*   BACTERIA Occasional   < > Occasional   SQUAMEPITHEL 2   < > 0   HYALINECASTS 0   < > 1    < > = values in this interval not displayed.     Wound Culture:   Recent Labs   Lab 01/30/24  0219 01/30/24  0228 02/19/24  0820 03/07/24  1254 03/08/24  0543   LABAERO PROTEUS MIRABILIS  Moderate  * PROTEUS  MIRABILIS  Moderate  * No growth PSEUDOMONAS AERUGINOSA  Moderate  Skin tonja also present  * PSEUDOMONAS AERUGINOSA  Few  Skin tonja also present  *     All pertinent labs within the past 24 hours have been reviewed.    Significant Imaging: I have reviewed all pertinent imaging results/findings within the past 24 hours.    I have personally reviewed records / hospital notes from   service and other specialty providers. I have also reviewed CBC, CMP/BMP,  cultures and imaging with my interpretation as documented in my assessment / plan.    Patient is high risk for infectious complications given pt's age, multiple co-morbidities, and case complexity.      Time: 50 minutes   50% of time spent on face-to-face counseling and coordination of care. Counseling included review of test results, diagnosis, and treatment plan with patient and/or family.

## 2024-03-12 NOTE — ASSESSMENT & PLAN NOTE
Nutrition consulted. Most recent weight and BMI monitored-     Measurements:  Wt Readings from Last 1 Encounters:   03/12/24 (!) 141.5 kg (311 lb 15.2 oz)   Body mass index is 47.43 kg/m².    Patient has been screened and assessed by RD.    Malnutrition Type:  Context:    Level:      Malnutrition Characteristic Summary:       Interventions/Recommendations (treatment strategy):  1. TF RECS: Novasource Renal @40ml/hr to provide 1920 kcals, 87g PRO, 688 ml fluid w/ additonal FWF per MD 2. Monitor labs and wt

## 2024-03-12 NOTE — ASSESSMENT & PLAN NOTE
Patient's anemia is currently controlled. Has not received any PRBCs to date. Etiology likely d/t chronic disease due to ESRD  Current CBC reviewed-   Lab Results   Component Value Date    HGB 8.0 (L) 03/12/2024    HCT 25.2 (L) 03/12/2024     Monitor serial CBC and transfuse if patient becomes hemodynamically unstable, symptomatic or H/H drops below 7/21.

## 2024-03-12 NOTE — ASSESSMENT & PLAN NOTE
Initially presented with DKA  A1c 10.4 on prior admission.     - Detemir 14 units daily --> Change to detemir 9U BID starting 3/12  - Novolog aspart 8 units q4h while on TFs  - Moderate dose SSI PRN

## 2024-03-12 NOTE — PROGRESS NOTES
Patient given CHG bath with 4 person assist. Glynn catheter care completed. Right groin dressing changed. Cleanse with normal saline. Gauze moistened with Dankin Solution and packed in wound. Clean dressing applied. Flexiseal no longer in rectum. New flexi-seal inserted.     Report given to Talha. Patient awaiting transportation for discharge.

## 2024-03-12 NOTE — DISCHARGE SUMMARY
[facility] - Stepdown Flex ([place] [address])  [facility] Medicine  Discharge Summary      Patient Name: Sarah Saravia  MRN: [id number]  JOSELIN: [id number]  Patient Class: IP- Inpatient  Admission Date: 3/7/2024  Hospital Length of Stay: 4 days  Discharge Date and Time:  03/12/2024 4:25 PM  Attending Physician: Janett Jean MD   Discharging Provider: Jessie Oh MD  Primary Care Provider: [facility] Medicine Team: [facility]OSP MED 1 Jessie Oh MD  Primary Care Team: [facility] HOSP MED 1    HPI:   53 y.o. female with hx of recent Ayaz's gangrene s/p surgical debridement, T2DM, AHRF s/p tracheostomy, embolic CVA, presents to the ED from [facility] with low grade fevers. 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 Hospital course was complicated by DKA, ART requiring RRT, acute respiratory failure requiring intubation and subsequent tracheostomy, and was found to have multiple infarcts on MRI brain with minimal spontaneous movements.  Palliative consulted however family wanted to proceed with trach (8.0/85 mm cuffed), PEG placement on 2/22/24. She was also evaluated by Nephrology for inability to fully concentrate urine. She was not completely anuric however due to ongoing needs for hemodialysis a tunneled PermCath was placed by Interventional Radiology on 2/29. Prior cultures w/ proteus mirabilis and bacteroides. She was treated with broad spectrum antibiotics and completed antibiotics 2/22. Patient was discharged to [facility] for wound care, therapy, respiratory and trach management on 3/5. She developed a fever 100.3 F with WBC trending up while at LT yesterday. She was noted to have copious malodorous purulence drainage from her wound and was started on Daptomycin and Meropenum by ID yesterday and was recommend for patient to be transferred to the ED for CT abd.    In the ED, patient  fevered at 100.3, was tachycardiac, tachypneic and sating at 100% on 5 L/min and FiO2 of 28 via trach. Labs notable for WBC 14.18, Hgb 8.9, Bicarb 20, , Cr 2.6, Phos 6.1, CRP 27.5. UA with WBC 28/hpf, Leukocyte esterases +1 concerning for UTI. CT A/P showed open wound of the right mons pubis with surrounding subcutaneous stranding and some trace subcutaneous gas concerning for infection. Gen surgery evaluated in ED; recommended admission to hospital medicine management of her chronic conditions.     History limited by patient's condition.    * No surgery found *      Hospital Course:   Presenting from LTAC w/ low grade fevers and increased drainage from wounds. Gen surg consulted; s/p bedside debridement. On Dapto/joyce. ID and nephro consulted. HD initiated. Groin abscess w/ pseudomonas sensitive to meropenem. Respiratory culture growing pseudomonas sensitive to meropenem and urine culture w/ GNR pending speciation and sensitivity. Dapto discontinued. Resolution of sepsis physiology and improvement in mental status to baseline. Will need meropenem through 3/21 per ID. Medically stable to return to LTAC. Plan for transfer to [facility] on evening of 3/12.       Goals of Care Treatment Preferences:  Code Status: Full Code          What is most important right now is to focus on curative/life-prolongation (regardless of treatment burdens).  Accordingly, we have decided that the best plan to meet the patient's goals includes continuing with treatment.      Consults:   Consults (From admission, onward)          Status Ordering Provider     Inpatient consult to Nephrology  Once        Provider:  (Not yet assigned)    Completed ALI, SAQEEB     Inpatient consult to Registered Dietitian/Nutritionist  Once        Provider:  (Not yet assigned)    Completed ALI, SAQEEB     Inpatient consult to Infectious Diseases  Once        Provider:  (Not yet assigned)    Completed ALI, SAQEEB     Inpatient consult to General  Surgery  Once        Provider:  (Not yet assigned)    Completed NAHOMI ARMIJO            No new Assessment & Plan notes have been filed under this hospital service since the last note was generated.  Service: Hospital Medicine    Final Active Diagnoses:    Diagnosis Date Noted POA    PRINCIPAL PROBLEM:  Sepsis [A41.9] 01/29/2024 Yes    Dysphagia [R13.10] 03/08/2024 Yes    HTN (hypertension) [I10] 03/08/2024 Yes    Diarrhea [R19.7] 03/08/2024 Yes    UTI (urinary tract infection) [N39.0] 03/08/2024 Yes    Anemia due to chronic kidney disease [N18.9, D63.1] 03/08/2024 Yes    Hypernatremia [E87.0] 03/08/2024 Yes    Severe malnutrition [E43] 03/08/2024 Yes    Thrombocytosis [D75.839] 03/05/2024 Yes    Status post tracheostomy [Z93.0] 02/23/2024 Not Applicable    Hyperphosphatemia [E83.39] 02/11/2024 Yes    Encephalopathy [G93.40] 02/03/2024 Yes    Type 2 diabetes mellitus, with long-term current use of insulin [E11.9, Z79.4] 02/02/2024 Not Applicable    Renal failure [N19] 02/02/2024 Yes    Metabolic acidosis [E87.20] 01/31/2024 Yes    ALVARADO (acute kidney injury) [N17.9] 01/30/2024 Yes    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] 01/29/2024 Yes      Problems Resolved During this Admission:       Discharged Condition: stable    Disposition: LTAC    Follow Up:   Follow-up Information       [facility] Phone: [phone] Follow up.    Contact information:  [address] [place] [address]                         Patient Instructions:   No discharge procedures on file.    Significant Diagnostic Studies: Labs: CMP   Recent Labs   Lab 03/11/24  0405 03/12/24  0345   * 136   K 4.5 4.0   CL 98 101   CO2 20* 20*   * 134*   * 61*   CREATININE 5.5* 3.7*   CALCIUM 9.8 9.6   PROT 8.2 8.1   ALBUMIN 2.4* 2.4*   BILITOT 0.2 0.2   ALKPHOS 145* 159*   AST 25 44*   ALT 23 27   ANIONGAP 14 15    and CBC   Recent Labs   Lab 03/11/24  0405 03/12/24  0345   WBC 12.24 11.06   HGB 8.4* 8.0*   HCT  26.2* 25.2*    324       Pending Diagnostic Studies:       None           Medications:  Transfer Medications (for Discharge Readmit only):   Current Facility-Administered Medications   Medication Dose Route Frequency Provider Last Rate Last Admin    0.9%  NaCl infusion   Intravenous Once Wilian Cat MD        acetaminophen oral solution 650 mg  650 mg Per G Tube Q4H PRN Soco Cristobal MD        albuterol-ipratropium 2.5 mg-0.5 mg/3 mL nebulizer solution 3 mL  3 mL Nebulization Q4H PRN Ali, Saqeeb, DO        amLODIPine tablet 10 mg  10 mg Per G Tube Daily Ali, Saqeeb, DO   10 mg at 03/12/24 1038    ascorbic acid (vitamin C) tablet 500 mg  500 mg Per G Tube BID Ali, Saqeeb, DO   500 mg at 03/12/24 1038    carvediloL tablet 25 mg  25 mg Per G Tube BID Ali, Saqeeb, DO   25 mg at 03/12/24 1038    chlorhexidine 0.12 % solution 15 mL  15 mL Mouth/Throat BID Ali, Saqeeb, DO   15 mL at 03/12/24 1104    dextrose 10% bolus 125 mL 125 mL  12.5 g Intravenous PRN Ali, Saqeeb, DO        dextrose 10% bolus 250 mL 250 mL  25 g Intravenous PRN Ali, Saqeeb, DO        glucagon (human recombinant) injection 1 mg  1 mg Intramuscular PRN Ali, Saqeeb, DO        heparin (porcine) injection 1,000 Units  1,000 Units Intra-Catheter PRN Wilian Cat MD   1,000 Units at 03/08/24 1816    heparin (porcine) injection 7,500 Units  7,500 Units Subcutaneous Q8H Ali, Saqeeb, DO   7,500 Units at 03/12/24 1423    hydrALAZINE tablet 25 mg  25 mg Per G Tube Q8H PRN Ali, Saqeeb, DO        insulin aspart U-100 pen 0-10 Units  0-10 Units Subcutaneous Q4H PRN Ali, Saqeeb, DO   2 Units at 03/12/24 0557    insulin aspart U-100 pen 8 Units  8 Units Subcutaneous Q4H Ali, Saqeeb, DO   8 Units at 03/12/24 1425    insulin detemir U-100 (Levemir) pen 9 Units  9 Units Subcutaneous BID Jessie Oh MD   9 Units at 03/12/24 1040    labetalol 20 mg/4 mL (5 mg/mL) IV syring  10 mg Intravenous Q6H PRN Phill Mar DO        meropenem (MERREM) 500  mg in sodium chloride 0.9 % 100 mL IVPB (MB+)  500 mg Intravenous Q24H Alfonzo Rucker MD        mupirocin 2 % ointment   Nasal BID Antione Hazel DO   Given at 03/12/24 1105    naloxone 0.4 mg/mL injection 0.02 mg  0.02 mg Intravenous PRN Zaid, Saqeeb, DO        ondansetron injection 4 mg  4 mg Intravenous Q6H PRN Cooper Mareeb, DO        pantoprazole suspension 40 mg  40 mg Per G Tube Daily Ali, Saqeeb, DO   40 mg at 03/12/24 1038    prochlorperazine injection Soln 5 mg  5 mg Intravenous Q6H PRN Ali, Saqeeb, DO        psyllium husk (aspartame) 3.4 gram packet 1 packet  1 packet Per G Tube Daily Ali, Saqeeb, DO   1 packet at 03/12/24 1038    sevelamer carbonate pwpk 1.6 g  1.6 g Per G Tube TID Sa Zaidqeeb, DO   1.6 g at 03/12/24 1423    sodium chloride 0.9% bolus 250 mL 250 mL  250 mL Intravenous PRN Wilian Cat MD        sodium chloride 0.9% bolus 250 mL 250 mL  250 mL Intravenous PRN Enrique Stokes MD        sodium chloride 0.9% flush 10 mL  10 mL Intravenous Q12H PRN Zaid, Saqeeb, DO        zinc sulfate capsule 220 mg  220 mg Per G Tube Daily Ali, Saqeeb, DO   220 mg at 03/12/24 1038       Indwelling Lines/Drains at time of discharge:   Lines/Drains/Airways       Central Venous Catheter Line  Duration                  Hemodialysis Catheter 02/29/24 1528 right internal jugular 11 days              Drain  Duration                  Open Drain Tube - 2 Right;Inferior;Medial Buttock Penrose -- days         Gastrostomy/Enterostomy 02/22/24 1200 LUQ 19 days         Open Drain 02/22/24 1414 Tube - 1 Right Thigh Penrose Other (see comments) 19 days         Urethral Catheter 03/07/24 1300 16 Fr. 5 days         Fecal Incontinence  03/09/24 1647 2 days              Airway  Duration             Adult Surgical Airway 02/22/24 Shiley Cuffed 8.0 / 85 mm 19 days                    Time spent on the discharge of patient: 35 minutes         Jessie Oh MD  Department of Hospital Medicine  [address] - Stepdown Flex  ([place])

## 2024-03-12 NOTE — PROGRESS NOTES
[facility] - [facility]down Flex ([facility]  Infectious Disease  Progress Note    Patient Name: Sarah Saravia  MRN: [id number]  Admission Date: 3/7/2024  Length of Stay: 3 days  Attending Physician: Janett Jean MD  Primary Care Provider: [facility]    Isolation Status: Contact  Assessment/Plan:      Renal/  Ayaz's gangrene  I have reviewed hospital notes from   service and other specialty providers. I have also reviewed CBC, CMP/BMP,  cultures and imaging with my interpretation as documented.      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 Prior cultures w/ proteus mirabilis and bacteroides. She was treated with broad spectrum antibiotics. 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 TTE without evidence of infective endocarditis. Patient completed antibiotics 2/22. Patient was admitted to [facility] for wound care, therapy, respiratory and trach management. ID consulted today to due low grade fevers with leukocytosis (14 <13). Ct-a/p: Rt mons-pubis wound c/f necrotizing SSTI, w/o abscess. Bibasilar pulmonary opacities most likely represent subsegmental atelectasis and scarring.  PNA or other pathology not excluded. Glynn exchanged. UA w/ pyuria - Ucx+PSA. Possible cystitis on Ct-a/p. Bld cx NGTD. Groin abscess cx +PSA. Sputum cx (trach in place) +PSA. Gen surg consulted for pt re-admission; no role for surgical intervention, but bedside on 03/08 posterior groin incision opened and is draining adequately at this time. Rectal tube in place, intermittent diarrhea.        Recommendations / Plan:  Discontinue Iv-daptomycin.   Continue Iv-merrem 1g Q24. If doses falls on HD, administer after dialysis.    To complete abx duration of 14d, NATE 03/21/24.  Agree with continuing GOC discussions, optimize nutrition and wound care.  Tentative plans for pt return to [facility]. May re-consult ID at [facility] near end of therapy (03/21/24) or as needed prior to that.    -- Discussed with ID staff and primary team.  -- ID will sign off at this time. Please see OPAT note below for outpt abx plans.       Outpatient Antibiotic Therapy Plan:    Please send referral to [facility].    1) Infection :   complicated NSTI (groing FG) cxs +PSA    2) Discharge Antibiotics:     Intravenous antibiotics:  IV - Merrem 1g Q24h.  If doses falls on HD, administer after dialysis.     3) Therapy Duration:    14d     Estimated end date of IV antibiotics:   03/21/24    4) Outpatient Weekly Labs:    Order the following labs to be drawn on Mondays:   CBC  CMP   CRP    5) Fax Lab Results to Infectious Diseases Provider:  Rubén Wynn PA-C    [facility] Fax Number: [phone]    6) Outpatient Infectious Diseases Follow-up    Follow-up appointment will be arranged by the ID clinic and will be found in the patient's appointments tab.    Prior to discharge, please ensure the patient's follow-up appt has been scheduled with ID-Clinic -- for patient convenience and continuity of care.  If there is still no follow-up scheduled prior to discharge, please send an EPIC message to  Teetee Wallis LPN  in Infectious Diseases.            Thank you for your consult. I will sign off. Please contact us if you have any additional questions.    Rubén Wynn PA-C  Infectious Disease  [facility] - Stepdown Flex ([place]-14)    Subjective:     Principal Problem:Sepsis    HPI:  Sarah Saravia is a 53-year-old woman with morbid obesity who was admitted in January for  Ayaz's gangrene requiring multiple surgical debridements for necrotizing infection, beginning on 1/29. Cultures w/ proteus mirabilis and bacteroides.  She was treated with broad spectrum antibiotics. 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 TTE without evidence of infective endocarditis.Thehe patients hospital course was further complicated by fever and leukocytosis prompting primary service for performed urine studies. 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 Patient completed antibiotics 2/22. Patient was admitted to [facility] for  wound care, PT/OT/ST, respiratory and trach management. ID consulted today to due low grade fevers at [facility] and found to have ongoing purulence from wound.  Patient transferred back to [facility] for eval.    Patient has been placed on Daptomycin and meropenem.  Tmax 100.3 and WBC 14s.  Blood cultures NGTD.  Gen Sx has eval'd patient and rec CT ABD/Pelvis, possible penrose drain removal and wound opening with packing (done 3/8), as well ad ID work up.  CT ABD/Pelvis:  1. Open wound of the right mons pubis with surrounding subcutaneous stranding and some trace subcutaneous gas.  Infection is favored.  Necrotizing infection not excluded.  Underlying neoplasm not excluded.  Correlate clinically.   2. Possible cystitis.  Correlate clinically.  No CT evidence of upper urinary tract infection at this time.   3. Radiopaque material in the intergluteal cleft possibly represents contrast evacuated from the rectum.  Correlate clinically for possible rectal incontinence.   4. Bibasilar pulmonary opacities most likely represent subsegmental atelectasis and scarring.  Pneumonia or other  pathology not excluded.     CXR: lungs clear, no acute process.     Wound cultures have been sent.  Catheterized urine - hazy with WBC 28. ID now consulted for abx recs.      Interval History: Remains AF, vss, hds, wbc normalized. On dapto / merrem. Plans for return to [facility]. No plans for gen surg intervention this admission.     Review of Systems  Objective:     Vital Signs (Most Recent):  Temp: 98.3 °F (36.8 °C) (03/11/24 1736)  Pulse: 92 (03/11/24 1736)  Resp: 19 (03/11/24 1600)  BP: 138/85 (03/11/24 1736)  SpO2: 100 % (03/11/24 1736) Vital Signs (24h Range):  Temp:  [98 °F (36.7 °C)-99.6 °F (37.6 °C)] 98.3 °F (36.8 °C)  Pulse:  [89-98] 92  Resp:  [17-22] 19  SpO2:  [96 %-100 %] 100 %  BP: (120-174)/(67-85) 138/85     Weight: (!) 145.1 kg (319 lb 14.2 oz)  Body mass index is 48.64 kg/m².    Estimated Creatinine Clearance: 18 mL/min (A) (based on SCr of 5.5 mg/dL (H)).     Physical Exam     Significant Labs: Blood Culture:   Recent Labs   Lab 01/29/24  2118 03/07/24  1115 03/07/24  1130 03/07/24  1500 03/07/24  1501   LABBLOO No Growth after 4 days. No Growth to date  No Growth to date  No Growth to date  No Growth to date  No Growth to date No Growth to date  No Growth to date  No Growth to date  No Growth to date  No Growth to date No Growth to date  No Growth to date  No Growth to date  No Growth to date No Growth to date  No Growth to date  No Growth to date  No Growth to date     CBC:   Recent Labs   Lab 03/10/24  0659 03/11/24  0405   WBC 14.92* 12.24   HGB 8.5* 8.4*   HCT 26.8* 26.2*    376     CMP:   Recent Labs   Lab 03/10/24  0659 03/11/24  0405   * 132*   K 5.2* 4.5   CL 98 98   CO2 18* 20*   * 157*   * 115*   CREATININE 4.5* 5.5*   CALCIUM 9.6 9.8   PROT 8.4 8.2   ALBUMIN 2.5* 2.4*   BILITOT 0.2 0.2   ALKPHOS 155* 145*   AST 24 25   ALT 23 23   ANIONGAP 18* 14     Microbiology Results (last 7 days)       Procedure Component Value Units Date/Time     Aerobic culture [[id number]]  (Abnormal)  (Susceptibility) Collected: 03/08/24 0543    Order Status: Completed Specimen: Abscess from Groin Updated: 03/11/24 1054     Aerobic Bacterial Culture PSEUDOMONAS AERUGINOSA  Few  Skin tonja also present      Culture, Respiratory with Gram Stain [[id number]]  (Abnormal)  (Susceptibility) Collected: 03/08/24 0403    Order Status: Completed Specimen: Respiratory from Tracheal Aspirate Updated: 03/11/24 0919     Respiratory Culture No S aureus isolated.      PSEUDOMONAS AERUGINOSA  Moderate  Normal respiratory tonja also present       Gram Stain (Respiratory) >10 epithelial cells per low power field     Gram Stain (Respiratory) Many WBC's     Gram Stain (Respiratory) No organisms seen    Urine culture [[id number]]  (Abnormal) Collected: 03/07/24 1722    Order Status: Completed Specimen: Urine Updated: 03/11/24 0834     Urine Culture, Routine GRAM NEGATIVE CHRISTIAN  10,000 - 49,999 cfu/ml  Identification and susceptibility pending      Narrative:      Specimen Source->Urine    Blood culture x two cultures. Draw prior to antibiotics. [[id number]] Collected: 03/07/24 1501    Order Status: Completed Specimen: Blood from Peripheral, Forearm, Right Updated: 03/10/24 2012     Blood Culture, Routine No Growth to date      No Growth to date      No Growth to date      No Growth to date    Narrative:      Aerobic and anaerobic    Blood culture x two cultures. Draw prior to antibiotics. [[id number]] Collected: 03/07/24 1500    Order Status: Completed Specimen: Blood from Peripheral, Hand, Right Updated: 03/10/24 2012     Blood Culture, Routine No Growth to date      No Growth to date      No Growth to date      No Growth to date    Narrative:      Aerobic and anaerobic    Culture, Anaerobe [[id number]] Collected: 03/08/24 0643    Order Status: Completed Specimen: Abscess from Groin Updated: 03/09/24 1321     Anaerobic Culture Culture in progress    Aerobic culture [[id number]]     Order  Status: Canceled Specimen: Abscess from Groin           Pathology Results  (Last 10 years)                 01/31/24 1140  Specimen to Pathology, Surgery General Surgery Edited Result - FINAL    Narrative:  Pre-op Diagnosis: 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]   Procedure(s):   EXPLORATION, WOUND   INSERTION, CATHETER, TRIPLE LUMEN, HEMODIALYSIS, TEMPORARY   Number of specimens: 1   Name of specimens: Right Necrotic Groin Mass   Which provider would you like to cc?->ROSENDA PERES S   Release to patient->Immediate   Specimen total (fresh, frozen, permanent):->1             Respiratory Culture:   Recent Labs   Lab 03/08/24  0403   GSRESP >10 epithelial cells per low power field  Many WBC's  No organisms seen   RESPIRATORYC No S aureus isolated.  PSEUDOMONAS AERUGINOSA  Moderate  Normal respiratory tonja also present  *     Urine Culture:   Recent Labs   Lab 02/14/24  1249 02/16/24  1214 03/07/24  1330 03/07/24  1722   LABURIN BURKHOLDERIA SPECIES  >100,000 cfu/ml  Further identified as B. multivorans  *  Multiple organisms isolated. None in predominance.  Repeat if  clinically necessary. CHRYSEOBACTERIUM INDOLOGENES  > 100,000 cfu/ml  * Multiple organisms isolated. None in predominance.  Repeat if  clinically necessary. GRAM NEGATIVE CHRISTIAN  10,000 - 49,999 cfu/ml  Identification and susceptibility pending  *     Urine Studies:   Recent Labs   Lab 01/30/24  0415 02/14/24  1249 03/07/24  1822   COLORU Orange*   < > Yellow   APPEARANCEUA Cloudy*   < > Hazy*   PHUR 6.0   < > 6.0   SPECGRAV 1.020   < > 1.020   PROTEINUA 2+*   < > 2+*   GLUCUA 4+*   < > Negative   KETONESU Trace*   < > Negative   BILIRUBINUA Negative   < > Negative   OCCULTUA 3+*   < > 3+*   NITRITE Negative   < > Negative   UROBILINOGEN Negative  --   --    LEUKOCYTESUR Negative   < > 1+*   RBCUA 74*   < > >100*   WBCUA 2   < > 28*   BACTERIA Occasional   < > Occasional   SQUAMEPITHEL 2   < > 0   HYALINECASTS 0   < > 1    < > = values in this interval not  displayed.     Wound Culture:   Recent Labs   Lab 01/30/24  0219 01/30/24  0228 02/19/24  0820 03/07/24  1254 03/08/24  0543   LABAERO PROTEUS MIRABILIS  Moderate  * PROTEUS MIRABILIS  Moderate  * No growth PSEUDOMONAS AERUGINOSA  Moderate  Skin tonja also present  * PSEUDOMONAS AERUGINOSA  Few  Skin tonja also present  *     All pertinent labs within the past 24 hours have been reviewed.    Significant Imaging: I have reviewed all pertinent imaging results/findings within the past 24 hours.    I have personally reviewed records / hospital notes from   service and other specialty providers. I have also reviewed CBC, CMP/BMP,  cultures and imaging with my interpretation as documented in my assessment / plan.    Patient is high risk for infectious complications given pt's age, multiple co-morbidities, and case complexity.      Time: 50 minutes   50% of time spent on face-to-face counseling and coordination of care. Counseling included review of test results, diagnosis, and treatment plan with patient and/or family.

## 2024-03-12 NOTE — ASSESSMENT & PLAN NOTE
52 y/o female who was recently admitted for forniers gangrene s/p multiple debridements, with a prolonged hospital course due to CVA, renal failure requiring HD, respiratory failure requring trach and PEG placement. After a prolonged hospital course she was discharged to LTAC on 3/7 however is now representing on 3/8 with fever, pyuria, and drainage from her posterior wound. Was seen by ID in LTAC and was started on Daptomycin and Meropenum prior to arrival.  CT A/P showed open wound of the right mons pubis with surrounding subcutaneous stranding and some trace subcutaneous gas concerning for infection.    - Surgery following. Opened wound on 3/8 and culture w/ pseudomonas sensitive to meropenem.   - ID consulted; continue meropenem (3/21 end date) and DC dapto  - Respiratory culture growing pseudomonas sensitive to meropenem  - Urine cultures GNR pending speciation/shireen  - Blood cx NGTD  - Wound care consulted

## 2024-03-12 NOTE — SUBJECTIVE & OBJECTIVE
Interval History: No issues overnight. Remains on merrem. Groin wound healing, penrose remains in place with minimal output    Medications:  Continuous Infusions:  Scheduled Meds:   sodium chloride 0.9%   Intravenous Once    amLODIPine  10 mg Per G Tube Daily    ascorbic acid (vitamin C)  500 mg Per G Tube BID    carvediloL  25 mg Per G Tube BID    chlorhexidine  15 mL Mouth/Throat BID    heparin (porcine)  7,500 Units Subcutaneous Q8H    insulin aspart U-100  8 Units Subcutaneous Q4H    insulin detemir U-100  9 Units Subcutaneous BID    meropenem (MERREM) IVPB  1 g Intravenous Q12H    mupirocin   Nasal BID    pantoprazole  40 mg Per G Tube Daily    psyllium husk (aspartame)  1 packet Per G Tube Daily    sevelamer carbonate  1.6 g Per G Tube TID    zinc sulfate  220 mg Per G Tube Daily     PRN Meds:acetaminophen, albuterol-ipratropium, dextrose 10%, dextrose 10%, glucagon (human recombinant), heparin (porcine), hydrALAZINE, insulin aspart U-100, labetalol, naloxone, ondansetron, prochlorperazine, sodium chloride 0.9%, sodium chloride 0.9%, sodium chloride 0.9%     Review of patient's allergies indicates:  No Known Allergies  Objective:     Vital Signs (Most Recent):  Temp: 98.1 °F (36.7 °C) (03/12/24 0442)  Pulse: 96 (03/12/24 0507)  Resp: 18 (03/12/24 0507)  BP: (!) 176/83 (03/12/24 0442)  SpO2: 98 % (03/12/24 0507) Vital Signs (24h Range):  Temp:  [98.1 °F (36.7 °C)-99.6 °F (37.6 °C)] 98.1 °F (36.7 °C)  Pulse:  [88-98] 96  Resp:  [18-20] 18  SpO2:  [97 %-100 %] 98 %  BP: (120-176)/(67-85) 176/83     Weight: (!) 141.5 kg (311 lb 15.2 oz)  Body mass index is 47.43 kg/m².    Intake/Output - Last 3 Shifts         03/10 0700 03/11 0659 03/11 0700 03/12 0659 03/12 0700  03/13 0659    P.O. 0      I.V. (mL/kg)  300 (2.1)     Other  300     NG/GT 1060 1050     IV Piggyback  150     Total Intake(mL/kg) 1060 (7.3) 1800 (12.7)     Urine (mL/kg/hr) 200 (0.1) 125 (0)     Other  1602     Stool 100 100     Total Output 300 2197      Net +760 -27            Stool Occurrence  1 x              Physical Exam  Vitals and nursing note reviewed.   Constitutional:       General: She is not in acute distress.     Appearance: She is obese.   Cardiovascular:      Rate and Rhythm: Normal rate.      Pulses: Normal pulses.   Pulmonary:      Effort: Pulmonary effort is normal. No respiratory distress.      Comments: trach  Abdominal:      General: There is no distension.      Palpations: Abdomen is soft.      Tenderness: There is no abdominal tenderness.   Genitourinary:     Comments: Right lower abdominal wound clean/dry; right groin wound clean/dry with penrose in place, no induration or erythema          Significant Labs:  I have reviewed all pertinent lab results within the past 24 hours.  CBC:   Recent Labs   Lab 03/12/24  0345   WBC 11.06   RBC 2.79*   HGB 8.0*   HCT 25.2*      MCV 90   MCH 28.7   MCHC 31.7*     BMP:   Recent Labs   Lab 03/12/24  0345   *      K 4.0      CO2 20*   BUN 61*   CREATININE 3.7*   CALCIUM 9.6   MG 1.9     CMP:   Recent Labs   Lab 03/12/24  0345   *   CALCIUM 9.6   ALBUMIN 2.4*   PROT 8.1      K 4.0   CO2 20*      BUN 61*   CREATININE 3.7*   ALKPHOS 159*   ALT 27   AST 44*   BILITOT 0.2       Significant Diagnostics:  I have reviewed all pertinent imaging results/findings within the past 24 hours.

## 2024-03-12 NOTE — PROGRESS NOTES
Pharmacist Renal Dose Adjustment Note    Sarah Saravia is a 53 y.o. female being treated with the medication meropenem    Patient Data:    Vital Signs (Most Recent):  Temp: 98.1 °F (36.7 °C) (03/12/24 0442)  Pulse: 101 (03/12/24 0921)  Resp: 18 (03/12/24 0921)  BP: (!) 176/83 (03/12/24 0442)  SpO2: 97 % (03/12/24 0921) Vital Signs (72h Range):  Temp:  [98 °F (36.7 °C)-99.6 °F (37.6 °C)]   Pulse:  []   Resp:  [17-22]   BP: (120-176)/(67-89)   SpO2:  [96 %-100 %]      Recent Labs   Lab 03/10/24  0659 03/11/24  0405 03/12/24  0345   CREATININE 4.5* 5.5* 3.7*     Serum creatinine: 3.7 mg/dL (H) 03/12/24 0345  Estimated creatinine clearance: 26.3 mL/min (A)    Change meropenem to 500 mg IVPB Q24H for intermittent hemodialysis dosing    Pharmacist's Name: Brett Ramirez  Pharmacist's Extension: [phone]

## 2024-03-12 NOTE — ASSESSMENT & PLAN NOTE
Ms. Saravia is a 52yo female well known to the surgical service.  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  After a prolonged hospital course she was discharged to LTAC on 3/7 however is now representing on 3/8 with fever, pyuria, and drainage from her posterior wound. On 3/8 this was opened and is draining adequately.      - no surgical intervention currently. Wound is clean and healing appropriately. Will penrose in place at this time  - wound is in close proximity to wound, until heals will need intensive hygiene precautions to keep stool away  - Infectious disease consult given her history of infections and to help with tailoring of antibiotics  - Rest of care per primary team

## 2024-03-12 NOTE — SUBJECTIVE & OBJECTIVE
Interval History: NAEON. Respiratory and lisa with pseudomonas susceptible to meropenem. ID recommending DC dapto. Will need meropenem through 3/21. Stable for dc to LTAC.    Review of Systems   Unable to perform ROS: Acuity of condition     Objective:     Vital Signs (Most Recent):  Temp: 98.1 °F (36.7 °C) (03/12/24 0442)  Pulse: 96 (03/12/24 0507)  Resp: 18 (03/12/24 0507)  BP: (!) 176/83 (03/12/24 0442)  SpO2: 98 % (03/12/24 0507) Vital Signs (24h Range):  Temp:  [98.1 °F (36.7 °C)-99.6 °F (37.6 °C)] 98.1 °F (36.7 °C)  Pulse:  [88-98] 96  Resp:  [18-20] 18  SpO2:  [97 %-100 %] 98 %  BP: (120-176)/(67-85) 176/83     Weight: (!) 141.5 kg (311 lb 15.2 oz)  Body mass index is 47.43 kg/m².    Intake/Output Summary (Last 24 hours) at 3/12/2024 0843  Last data filed at 3/12/2024 0600  Gross per 24 hour   Intake 1800 ml   Output 1827 ml   Net -27 ml         Physical Exam  Vitals and nursing note reviewed.   Constitutional:       General: She is not in acute distress.     Appearance: She is obese. She is not toxic-appearing.      Comments: Unable to respond or follow commands   HENT:      Head: Atraumatic.   Neck:      Comments: Tracheostomy in place    Cardiovascular:      Rate and Rhythm: Normal rate and regular rhythm.      Heart sounds: Normal heart sounds.   Pulmonary:      Effort: Pulmonary effort is normal.      Breath sounds: Normal breath sounds.   Abdominal:      General: Abdomen is flat.      Tenderness: There is no abdominal tenderness.   Musculoskeletal:         General: Normal range of motion.   Skin:     General: Skin is warm and dry.      Comments: Groin ulcerations with bandages in place clean/dry   Neurological:      Mental Status: She is oriented to person, place, and time.             Significant Labs: All pertinent labs within the past 24 hours have been reviewed.  CBC:   Recent Labs   Lab 03/11/24  0405 03/12/24  0345   WBC 12.24 11.06   HGB 8.4* 8.0*   HCT 26.2* 25.2*    324     CMP:   Recent  Labs   Lab 03/11/24  0405 03/12/24  0345   * 136   K 4.5 4.0   CL 98 101   CO2 20* 20*   * 134*   * 61*   CREATININE 5.5* 3.7*   CALCIUM 9.8 9.6   PROT 8.2 8.1   ALBUMIN 2.4* 2.4*   BILITOT 0.2 0.2   ALKPHOS 145* 159*   AST 25 44*   ALT 23 27   ANIONGAP 14 15       Significant Imaging: I have reviewed all pertinent imaging results/findings within the past 24 hours.

## 2024-03-12 NOTE — PLAN OF CARE
Problem: Device-Related Complication Risk (Hemodialysis)  Goal: Safe, Effective Therapy Delivery  Outcome: Ongoing, Progressing     Problem: Hemodynamic Instability (Hemodialysis)  Goal: Effective Tissue Perfusion  Outcome: Ongoing, Progressing     Problem: Infection (Hemodialysis)  Goal: Absence of Infection Signs and Symptoms  Outcome: Ongoing, Progressing       Dialysis tx ended to the right chest perm cath, heparin locked, capped.    Net fluid removed: 1L    Report given to nurse Meeks pt awaiting transport by bed from [facility] to room 77054.

## 2024-03-12 NOTE — RESPIRATORY THERAPY
"RAPID RESPONSE RESPIRATORY THERAPY PROACTIVE NOTE           Time of visit: 1012     Code Status: Full Code   : 1970  Bed: 42222/33709 A:   MRN: [id number]  Time spent at the bedside: < 15 min    SITUATION    Evaluated patient for: LDA Check     BACKGROUND    Patient has no past medical history on file.  Clinically Significant Surgical Hx: tracheostomy    24 Hours Vitals Range:  Temp:  [98.1 °F (36.7 °C)-99.6 °F (37.6 °C)]   Pulse:  []   Resp:  [18-20]   BP: (120-176)/(67-85)   SpO2:  [97 %-100 %]     Labs:    Recent Labs     03/10/24  0659 24  0405 24  0345   * 132* 136   K 5.2* 4.5 4.0   CL 98 98 101   CO2 18* 20* 20*   * 115* 61*   CREATININE 4.5* 5.5* 3.7*   * 157* 134*   PHOS 4.8* 4.2 3.2   MG 2.0 2.0 1.9        No results for input(s): "PH", "PCO2", "PO2", "HCO3", "POCSATURATED", "BE" in the last 72 hours.    ASSESSMENT/INTERVENTIONS  Patient resting comfortably. No respiratory concerns at this time.      Last VS   Temp: 98.1 °F (36.7 °C) ( 044)  Pulse: 92 ( 1047)  Resp: 18 (921)  BP: 176/83 ( 0442)  SpO2: 97 % (921)      Extra trachs at bedside: #6 Shiley Cuffed, #8 Shiley Cuffed  Level of Consciousness: Level of Consciousness (AVPU): alert  Respiratory Effort: Respiratory Effort: Normal, Unlabored Expansion/Accessory Muscle Usage: Expansion/Accessory Muscles/Retractions: expansion symmetric, no retractions  All Lung Field Breath Sounds: All Lung Fields Breath Sounds: Anterior:, Lateral:, diminished  JOSE Breath Sounds: coarse  LLL Breath Sounds: Anterior:, Lateral:, diminished, clear  RUL Breath Sounds: coarse  RML Breath Sounds: Anterior:, Lateral:, coarse, diminished  RLL Breath Sounds: Anterior:, Lateral:, diminished  O2 Device/Concentration: 5L/28%  Surgical airway: Yes, Type: Shiley Size: 8, cuffed  Ambu at bedside:       Active Orders   Respiratory Care    Inhalation Treatment Q4H PRN     Frequency: Q4H PRN     Number of " Occurrences: Until Specified    Oxygen Continuous     Frequency: Continuous     Number of Occurrences: Until Specified     Order Questions:      Device type: Low flow      Device: Trach Collar      FiO2%: 40      Titrate O2 per Oxygen Titration Protocol: Yes      To maintain SpO2 goal of: >= 90%      Notify MD of: Inability to achieve desired SpO2; Sudden change in patient status and requires 20% increase in FiO2; Patient requires >60% FiO2    Pulse Oximetry Continuous     Frequency: Continuous     Number of Occurrences: Until Specified    Routine tracheostomy care     Frequency: BID     Number of Occurrences: Until Specified    SUCTION Q4H     Frequency: Q4H     Number of Occurrences: Until Specified       RECOMMENDATIONS    We recommend: RRT Recs: Continue POC per primary team.      FOLLOW-UP    Please call back the Rapid Response RTYadi RRT at x [phone] for any questions or concerns.

## 2024-03-12 NOTE — PROGRESS NOTES
[facility] - [facility]down Flex ([facility]-)  General Surgery  Progress Note    Subjective:         Post-Op Info:  * No surgery found *         Interval History: No issues overnight. Remains on merrem. Groin wound healing, penrose remains in place with minimal output    Medications:  Continuous Infusions:  Scheduled Meds:   sodium chloride 0.9%   Intravenous Once    amLODIPine  10 mg Per G Tube Daily    ascorbic acid (vitamin C)  500 mg Per G Tube BID    carvediloL  25 mg Per G Tube BID    chlorhexidine  15 mL Mouth/Throat BID    heparin (porcine)  7,500 Units Subcutaneous Q8H    insulin aspart U-100  8 Units Subcutaneous Q4H    insulin detemir U-100  9 Units Subcutaneous BID    meropenem (MERREM) IVPB  1 g Intravenous Q12H    mupirocin   Nasal BID    pantoprazole  40 mg Per G Tube Daily    psyllium husk (aspartame)  1 packet Per G Tube Daily    sevelamer carbonate  1.6 g Per G Tube TID    zinc sulfate  220 mg Per G Tube Daily     PRN Meds:acetaminophen, albuterol-ipratropium, dextrose 10%, dextrose 10%, glucagon (human recombinant), heparin (porcine), hydrALAZINE, insulin aspart U-100, labetalol, naloxone, ondansetron, prochlorperazine, sodium chloride 0.9%, sodium chloride 0.9%, sodium chloride 0.9%     Review of patient's allergies indicates:  No Known Allergies  Objective:     Vital Signs (Most Recent):  Temp: 98.1 °F (36.7 °C) (03/12/24 0442)  Pulse: 96 (03/12/24 0507)  Resp: 18 (03/12/24 0507)  BP: (!) 176/83 (03/12/24 0442)  SpO2: 98 % (03/12/24 0507) Vital Signs (24h Range):  Temp:  [98.1 °F (36.7 °C)-99.6 °F (37.6 °C)] 98.1 °F (36.7 °C)  Pulse:  [88-98] 96  Resp:  [18-20] 18  SpO2:  [97 %-100 %] 98 %  BP: (120-176)/(67-85) 176/83     Weight: (!) 141.5 kg (311 lb 15.2 oz)  Body mass index is 47.43 kg/m².    Intake/Output - Last 3 Shifts         03/10 0700 03/11 0659 03/11 0700 03/12 0659 03/12 0700 03/13 0659    P.O. 0      I.V. (mL/kg)  300 (2.1)     Other  300     NG/GT 1060 1050     IV Piggyback  150      Total Intake(mL/kg) 1060 (7.3) 1800 (12.7)     Urine (mL/kg/hr) 200 (0.1) 125 (0)     Other  1602     Stool 100 100     Total Output 300 1827     Net +760 -27            Stool Occurrence  1 x              Physical Exam  Vitals and nursing note reviewed.   Constitutional:       General: She is not in acute distress.     Appearance: She is obese.   Cardiovascular:      Rate and Rhythm: Normal rate.      Pulses: Normal pulses.   Pulmonary:      Effort: Pulmonary effort is normal. No respiratory distress.      Comments: trach  Abdominal:      General: There is no distension.      Palpations: Abdomen is soft.      Tenderness: There is no abdominal tenderness.   Genitourinary:     Comments: Right lower abdominal wound clean/dry; right groin wound clean/dry with penrose in place, no induration or erythema          Significant Labs:  I have reviewed all pertinent lab results within the past 24 hours.  CBC:   Recent Labs   Lab 03/12/24  0345   WBC 11.06   RBC 2.79*   HGB 8.0*   HCT 25.2*      MCV 90   MCH 28.7   MCHC 31.7*     BMP:   Recent Labs   Lab 03/12/24  0345   *      K 4.0      CO2 20*   BUN 61*   CREATININE 3.7*   CALCIUM 9.6   MG 1.9     CMP:   Recent Labs   Lab 03/12/24  0345   *   CALCIUM 9.6   ALBUMIN 2.4*   PROT 8.1      K 4.0   CO2 20*      BUN 61*   CREATININE 3.7*   ALKPHOS 159*   ALT 27   AST 44*   BILITOT 0.2       Significant Diagnostics:  I have reviewed all pertinent imaging results/findings within the past 24 hours.  Assessment/Plan:     * Sepsis  Ms. Saravia is a 52yo female well known to the surgical service.  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  After a prolonged hospital course she was discharged to LTAC on 3/7 however is now representing on 3/8 with fever, pyuria, and drainage from her posterior wound.  On 3/8 this was opened and is draining adequately.      - no surgical intervention currently. Wound is clean and healing appropriately. Will penrose in place at this time  - wound is in close proximity to wound, until heals will need intensive hygiene precautions to keep stool away  - Infectious disease consult given her history of infections and to help with tailoring of antibiotics  - Rest of care per primary team    Ok for general surgery perspective for transfer back to LTAC.         Sil Philippe MD  General Surgery  [facility] - Stepdown Flex ([place])

## 2024-03-12 NOTE — ASSESSMENT & PLAN NOTE
I have reviewed hospital notes from   service and other specialty providers. I have also reviewed CBC, CMP/BMP,  cultures and imaging with my interpretation as documented.      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 Prior cultures w/ proteus mirabilis and bacteroides. She was treated with broad spectrum antibiotics. 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 TTE without evidence of infective endocarditis. Patient completed antibiotics 2/22. Patient was admitted to [facility] for wound care, therapy, respiratory and trach management. ID consulted today to due low grade fevers with leukocytosis (14 <13). Ct-a/p: Rt mons-pubis wound c/f necrotizing SSTI, w/o abscess. Bibasilar pulmonary opacities most likely represent subsegmental atelectasis and scarring.  PNA or other pathology not excluded. Glynn exchanged. UA w/ pyuria - Ucx+PSA. Possible cystitis on Ct-a/p. Bld cx NGTD. Groin abscess cx +PSA. Sputum cx (trach in place) +PSA. Gen surg consulted for pt re-admission; no role for surgical intervention, but bedside on 03/08 posterior groin incision opened and is draining adequately at this time. Rectal tube in place, intermittent diarrhea.        Recommendations / Plan:  Discontinue Iv-daptomycin.   Continue Iv-merrem 1g Q24. If doses falls on HD, administer after dialysis.   To complete abx duration of 14d, NATE 03/21/24.  Agree with continuing GOC discussions, optimize nutrition and wound care.  Tentative plans for pt return to [facility]. May re-consult ID at [facility] near end of therapy (03/21/24) or as needed prior to that.    -- Discussed with ID staff and primary team.  -- ID will sign off at this time. Please see OPAT note below  for outpt abx plans.       Outpatient Antibiotic Therapy Plan:    Please send referral to [facility].    1) Infection :   complicated NSTI (groing FG) cxs +PSA    2) Discharge Antibiotics:     Intravenous antibiotics:  IV - Merrem 1g Q24h.  If doses falls on HD, administer after dialysis.     3) Therapy Duration:    14d     Estimated end date of IV antibiotics:   03/21/24    4) Outpatient Weekly Labs:    Order the following labs to be drawn on Mondays:   CBC  CMP   CRP    5) Fax Lab Results to Infectious Diseases Provider:  Rubén Wynn PA-C    [facility] Fax Number: [phone]    6) Outpatient Infectious Diseases Follow-up    Follow-up appointment will be arranged by the ID clinic and will be found in the patient's appointments tab.    Prior to discharge, please ensure the patient's follow-up appt has been scheduled with ID-Clinic -- for patient convenience and continuity of care.  If there is still no follow-up scheduled prior to discharge, please send an EPIC message to  Teetee Wallis LPN  in Infectious Diseases.

## 2024-03-13 PROBLEM — N17.0 ACUTE RENAL FAILURE WITH TUBULAR NECROSIS: Status: ACTIVE | Noted: 2024-01-30

## 2024-03-13 PROBLEM — E87.0 HYPERNATREMIA: Status: RESOLVED | Noted: 2024-03-08 | Resolved: 2024-03-13

## 2024-03-13 NOTE — PLAN OF CARE
[facility] - Stepdown Flex ([place])  Discharge Final Note    Primary Care Provider: [facility]    Expected Discharge Date: 3/12/2024    Final Discharge Note (most recent)       Final Note - 03/12/24 1616          Final Note    What phone number can be called within the next 1-3 days to see how you are doing after discharge? [phone]                                  Contact Info       [facility] Phone: [phone] [address] [place] [address]       Next Steps: Follow up          No future appointments.      CM spoke with patients daughter and significant other via phone  and discussed discharge plans, patient to [facility]  No  medications delivered to bedside. No   HME/DME  delivered  to bedside and follow up appointment[s] scheduled.   [facility] and patient is oxygen dependent on 5L.      Gisel Patel RN  Case Management  [facility]  [phone]

## 2024-03-13 NOTE — PROGRESS NOTES
Patient discharged to Lt[facility] via [facility]. Respiratory at bedside to ensure patient is transitioned to portable oxygen safety. Glynn catheter and flexi-seal in place.Telemetry monitor discontinued. Patient escorted off the unit via stretcher. Aleks at bedside. Will continue to monitor.

## 2024-03-18 LAB — FUNGUS SPEC CULT: NORMAL

## 2024-03-19 LAB
ACID FAST MOD KINY STN SPEC: NORMAL
ACID FAST MOD KINY STN SPEC: NORMAL
MYCOBACTERIUM SPEC QL CULT: NORMAL
MYCOBACTERIUM SPEC QL CULT: NORMAL

## 2024-04-08 LAB
ACID FAST MOD KINY STN SPEC: NORMAL
MYCOBACTERIUM SPEC QL CULT: NORMAL

## 2024-04-10 ENCOUNTER — HOSPITAL ENCOUNTER (INPATIENT)
Facility: HOSPITAL | Age: 54
LOS: 89 days | DRG: 689 | End: 2024-07-08
Attending: EMERGENCY MEDICINE | Admitting: EMERGENCY MEDICINE
Payer: MEDICAID

## 2024-04-10 DIAGNOSIS — T82.9XXA COMPLICATION OF VASCULAR DIALYSIS CATHETER, UNSPECIFIED COMPLICATION, INITIAL ENCOUNTER: ICD-10-CM

## 2024-04-10 DIAGNOSIS — Z93.1 PEG (PERCUTANEOUS ENDOSCOPIC GASTROSTOMY) STATUS: ICD-10-CM

## 2024-04-10 DIAGNOSIS — R78.81 GRAM-POSITIVE BACTEREMIA: ICD-10-CM

## 2024-04-10 DIAGNOSIS — R13.12 OROPHARYNGEAL DYSPHAGIA: ICD-10-CM

## 2024-04-10 DIAGNOSIS — N18.6 ESRD (END STAGE RENAL DISEASE) ON DIALYSIS: ICD-10-CM

## 2024-04-10 DIAGNOSIS — R00.0 TACHYCARDIA: ICD-10-CM

## 2024-04-10 DIAGNOSIS — I49.8 ATRIAL ARRHYTHMIA: ICD-10-CM

## 2024-04-10 DIAGNOSIS — Z93.0 STATUS POST TRACHEOSTOMY: ICD-10-CM

## 2024-04-10 DIAGNOSIS — E87.5 HYPERKALEMIA: ICD-10-CM

## 2024-04-10 DIAGNOSIS — A41.9 SEPSIS, DUE TO UNSPECIFIED ORGANISM, UNSPECIFIED WHETHER ACUTE ORGAN DYSFUNCTION PRESENT: Primary | ICD-10-CM

## 2024-04-10 DIAGNOSIS — Z99.2 ESRD (END STAGE RENAL DISEASE) ON DIALYSIS: ICD-10-CM

## 2024-04-10 DIAGNOSIS — D72.829 LEUKOCYTOSIS, UNSPECIFIED TYPE: ICD-10-CM

## 2024-04-10 DIAGNOSIS — A41.9 SEPSIS: ICD-10-CM

## 2024-04-10 DIAGNOSIS — M48.02 CERVICAL STENOSIS OF SPINAL CANAL: ICD-10-CM

## 2024-04-10 DIAGNOSIS — R07.9 CHEST PAIN: ICD-10-CM

## 2024-04-10 DIAGNOSIS — R41.82 ALTERED MENTAL STATUS, UNSPECIFIED ALTERED MENTAL STATUS TYPE: ICD-10-CM

## 2024-04-10 DIAGNOSIS — R53.81 DEBILITY: ICD-10-CM

## 2024-04-10 DIAGNOSIS — L30.4 INTERTRIGO: ICD-10-CM

## 2024-04-10 DIAGNOSIS — T14.8XXA CHRONIC WOUND: ICD-10-CM

## 2024-04-10 DIAGNOSIS — N30.01 ACUTE CYSTITIS WITH HEMATURIA: ICD-10-CM

## 2024-04-10 PROBLEM — E11.65 TYPE 2 DIABETES MELLITUS WITH HYPERGLYCEMIA, WITH LONG-TERM CURRENT USE OF INSULIN: Status: ACTIVE | Noted: 2024-02-02

## 2024-04-10 PROBLEM — Z86.73 HISTORY OF ISCHEMIC MULTIFOCAL MULTIPLE VASCULAR TERRITORIES STROKE: Status: ACTIVE | Noted: 2024-02-29

## 2024-04-10 PROBLEM — I69.951: Status: ACTIVE | Noted: 2024-04-10

## 2024-04-10 LAB
ALLENS TEST: ABNORMAL
ALLENS TEST: ABNORMAL
BASOPHILS # BLD AUTO: 0.07 K/UL (ref 0–0.2)
BASOPHILS NFR BLD: 0.4 % (ref 0–1.9)
DIFFERENTIAL METHOD BLD: ABNORMAL
EOSINOPHIL # BLD AUTO: 0.4 K/UL (ref 0–0.5)
EOSINOPHIL NFR BLD: 2.2 % (ref 0–8)
ERYTHROCYTE [DISTWIDTH] IN BLOOD BY AUTOMATED COUNT: 15.7 % (ref 11.5–14.5)
HCO3 UR-SCNC: 25.7 MMOL/L (ref 24–28)
HCT VFR BLD AUTO: 36.1 % (ref 37–48.5)
HGB BLD-MCNC: 11.4 G/DL (ref 12–16)
IMM GRANULOCYTES # BLD AUTO: 0.13 K/UL (ref 0–0.04)
IMM GRANULOCYTES NFR BLD AUTO: 0.8 % (ref 0–0.5)
LDH SERPL L TO P-CCNC: 3.86 MMOL/L (ref 0.5–2.2)
LYMPHOCYTES # BLD AUTO: 3.1 K/UL (ref 1–4.8)
LYMPHOCYTES NFR BLD: 18.3 % (ref 18–48)
MCH RBC QN AUTO: 28 PG (ref 27–31)
MCHC RBC AUTO-ENTMCNC: 31.6 G/DL (ref 32–36)
MCV RBC AUTO: 89 FL (ref 82–98)
MONOCYTES # BLD AUTO: 1.4 K/UL (ref 0.3–1)
MONOCYTES NFR BLD: 8 % (ref 4–15)
NEUTROPHILS # BLD AUTO: 12.1 K/UL (ref 1.8–7.7)
NEUTROPHILS NFR BLD: 70.3 % (ref 38–73)
NRBC BLD-RTO: 0 /100 WBC
PCO2 BLDA: 37.7 MMHG (ref 35–45)
PH SMN: 7.44 [PH] (ref 7.35–7.45)
PLATELET # BLD AUTO: 297 K/UL (ref 150–450)
PMV BLD AUTO: 10.8 FL (ref 9.2–12.9)
PO2 BLDA: 39 MMHG (ref 40–60)
POC BE: 2 MMOL/L
POC SATURATED O2: 76 % (ref 95–100)
POC TCO2: 27 MMOL/L (ref 24–29)
RBC # BLD AUTO: 4.07 M/UL (ref 4–5.4)
SAMPLE: ABNORMAL
SAMPLE: ABNORMAL
SITE: ABNORMAL
SITE: ABNORMAL
WBC # BLD AUTO: 17.15 K/UL (ref 3.9–12.7)

## 2024-04-10 PROCEDURE — 25000003 PHARM REV CODE 250: Performed by: EMERGENCY MEDICINE

## 2024-04-10 PROCEDURE — 87040 BLOOD CULTURE FOR BACTERIA: CPT | Mod: 59 | Performed by: EMERGENCY MEDICINE

## 2024-04-10 PROCEDURE — 99900035 HC TECH TIME PER 15 MIN (STAT)

## 2024-04-10 PROCEDURE — 87150 DNA/RNA AMPLIFIED PROBE: CPT | Performed by: EMERGENCY MEDICINE

## 2024-04-10 PROCEDURE — 99900026 HC AIRWAY MAINTENANCE (STAT)

## 2024-04-10 PROCEDURE — 87186 SC STD MICRODIL/AGAR DIL: CPT | Mod: 59 | Performed by: EMERGENCY MEDICINE

## 2024-04-10 PROCEDURE — 83605 ASSAY OF LACTIC ACID: CPT

## 2024-04-10 PROCEDURE — 85025 COMPLETE CBC W/AUTO DIFF WBC: CPT | Performed by: EMERGENCY MEDICINE

## 2024-04-10 PROCEDURE — 82803 BLOOD GASES ANY COMBINATION: CPT

## 2024-04-10 PROCEDURE — 12000002 HC ACUTE/MED SURGE SEMI-PRIVATE ROOM

## 2024-04-10 PROCEDURE — 87077 CULTURE AEROBIC IDENTIFY: CPT | Mod: 59 | Performed by: EMERGENCY MEDICINE

## 2024-04-10 PROCEDURE — 96365 THER/PROPH/DIAG IV INF INIT: CPT

## 2024-04-10 PROCEDURE — 93005 ELECTROCARDIOGRAM TRACING: CPT

## 2024-04-10 PROCEDURE — 63600175 PHARM REV CODE 636 W HCPCS: Performed by: EMERGENCY MEDICINE

## 2024-04-10 PROCEDURE — 94761 N-INVAS EAR/PLS OXIMETRY MLT: CPT | Mod: XB

## 2024-04-10 PROCEDURE — 93010 ELECTROCARDIOGRAM REPORT: CPT | Mod: ,,, | Performed by: INTERNAL MEDICINE

## 2024-04-10 RX ADMIN — PIPERACILLIN SODIUM AND TAZOBACTAM SODIUM 4.5 G: 4; .5 INJECTION, POWDER, FOR SOLUTION INTRAVENOUS at 11:04

## 2024-04-10 RX ADMIN — SODIUM CHLORIDE, POTASSIUM CHLORIDE, SODIUM LACTATE AND CALCIUM CHLORIDE 500 ML: 600; 310; 30; 20 INJECTION, SOLUTION INTRAVENOUS at 11:04

## 2024-04-10 NOTE — Clinical Note
Pt Arrived in lab with dialysis catheter in R subclavian vein. Catheter cleaned and draped sterilely.

## 2024-04-10 NOTE — Clinical Note
The left groin and right neck was prepped. The site was prepped with ChloraPrep. The site was clipped. The patient was draped.

## 2024-04-11 PROBLEM — E87.1 HYPONATREMIA: Status: RESOLVED | Noted: 2024-01-30 | Resolved: 2024-04-11

## 2024-04-11 PROBLEM — J98.11 ATELECTASIS: Status: ACTIVE | Noted: 2024-04-11

## 2024-04-11 PROBLEM — E83.41 HYPERMAGNESEMIA: Status: ACTIVE | Noted: 2024-04-11

## 2024-04-11 PROBLEM — N30.01 ACUTE CYSTITIS WITH HEMATURIA: Status: ACTIVE | Noted: 2024-04-11

## 2024-04-11 PROBLEM — R94.31 PROLONGED QT INTERVAL: Status: ACTIVE | Noted: 2024-04-11

## 2024-04-11 LAB
ALBUMIN SERPL BCP-MCNC: 3.4 G/DL (ref 3.5–5.2)
ALLENS TEST: ABNORMAL
ALP SERPL-CCNC: 137 U/L (ref 55–135)
ALT SERPL W/O P-5'-P-CCNC: 33 U/L (ref 10–44)
ANION GAP SERPL CALC-SCNC: 18 MMOL/L (ref 8–16)
AST SERPL-CCNC: 25 U/L (ref 10–40)
BACTERIA #/AREA URNS AUTO: ABNORMAL /HPF
BASOPHILS # BLD AUTO: 0.09 K/UL (ref 0–0.2)
BASOPHILS NFR BLD: 0.6 % (ref 0–1.9)
BILIRUB SERPL-MCNC: 0.3 MG/DL (ref 0.1–1)
BILIRUB UR QL STRIP: ABNORMAL
BUN SERPL-MCNC: 72 MG/DL (ref 6–20)
CALCIUM SERPL-MCNC: 10.7 MG/DL (ref 8.7–10.5)
CHLORIDE SERPL-SCNC: 95 MMOL/L (ref 95–110)
CLARITY UR REFRACT.AUTO: ABNORMAL
CO2 SERPL-SCNC: 22 MMOL/L (ref 23–29)
COLOR UR AUTO: ABNORMAL
CREAT SERPL-MCNC: 5.8 MG/DL (ref 0.5–1.4)
DIFFERENTIAL METHOD BLD: ABNORMAL
EOSINOPHIL # BLD AUTO: 0.5 K/UL (ref 0–0.5)
EOSINOPHIL NFR BLD: 2.8 % (ref 0–8)
ERYTHROCYTE [DISTWIDTH] IN BLOOD BY AUTOMATED COUNT: 15.5 % (ref 11.5–14.5)
EST. GFR  (NO RACE VARIABLE): 8.2 ML/MIN/1.73 M^2
GLUCOSE SERPL-MCNC: 306 MG/DL (ref 70–110)
GLUCOSE UR QL STRIP: ABNORMAL
HCT VFR BLD AUTO: 33.4 % (ref 37–48.5)
HGB BLD-MCNC: 10.4 G/DL (ref 12–16)
HGB UR QL STRIP: ABNORMAL
HYALINE CASTS UR QL AUTO: 71 /LPF
IMM GRANULOCYTES # BLD AUTO: 0.16 K/UL (ref 0–0.04)
IMM GRANULOCYTES NFR BLD AUTO: 1 % (ref 0–0.5)
KETONES UR QL STRIP: ABNORMAL
LACTATE SERPL-SCNC: 2.1 MMOL/L (ref 0.5–2.2)
LACTATE SERPL-SCNC: 2.4 MMOL/L (ref 0.5–2.2)
LACTATE SERPL-SCNC: 3.1 MMOL/L (ref 0.5–2.2)
LDH SERPL L TO P-CCNC: 3.49 MMOL/L (ref 0.5–2.2)
LEUKOCYTE ESTERASE UR QL STRIP: ABNORMAL
LYMPHOCYTES # BLD AUTO: 2.8 K/UL (ref 1–4.8)
LYMPHOCYTES NFR BLD: 17.4 % (ref 18–48)
MCH RBC QN AUTO: 28.1 PG (ref 27–31)
MCHC RBC AUTO-ENTMCNC: 31.1 G/DL (ref 32–36)
MCV RBC AUTO: 90 FL (ref 82–98)
MICROSCOPIC COMMENT: ABNORMAL
MONOCYTES # BLD AUTO: 1.5 K/UL (ref 0.3–1)
MONOCYTES NFR BLD: 9 % (ref 4–15)
NEUTROPHILS # BLD AUTO: 11.3 K/UL (ref 1.8–7.7)
NEUTROPHILS NFR BLD: 69.2 % (ref 38–73)
NITRITE UR QL STRIP: NEGATIVE
NRBC BLD-RTO: 0 /100 WBC
OHS QRS DURATION: 138 MS
OHS QTC CALCULATION: 526 MS
PH UR STRIP: 5 [PH] (ref 5–8)
PLATELET # BLD AUTO: 265 K/UL (ref 150–450)
PMV BLD AUTO: 10.9 FL (ref 9.2–12.9)
POCT GLUCOSE: 250 MG/DL (ref 70–110)
POCT GLUCOSE: 318 MG/DL (ref 70–110)
POCT GLUCOSE: 345 MG/DL (ref 70–110)
POCT GLUCOSE: 377 MG/DL (ref 70–110)
POTASSIUM SERPL-SCNC: 4.7 MMOL/L (ref 3.5–5.1)
PROT SERPL-MCNC: 9.7 G/DL (ref 6–8.4)
PROT UR QL STRIP: ABNORMAL
RBC # BLD AUTO: 3.7 M/UL (ref 4–5.4)
RBC #/AREA URNS AUTO: 61 /HPF (ref 0–4)
SAMPLE: ABNORMAL
SITE: ABNORMAL
SODIUM SERPL-SCNC: 135 MMOL/L (ref 136–145)
SP GR UR STRIP: 1.02 (ref 1–1.03)
SQUAMOUS #/AREA URNS AUTO: 66 /HPF
URN SPEC COLLECT METH UR: ABNORMAL
WBC # BLD AUTO: 16.3 K/UL (ref 3.9–12.7)
WBC #/AREA URNS AUTO: >100 /HPF (ref 0–5)
WBC CLUMPS UR QL AUTO: ABNORMAL

## 2024-04-11 PROCEDURE — 94761 N-INVAS EAR/PLS OXIMETRY MLT: CPT

## 2024-04-11 PROCEDURE — 63600175 PHARM REV CODE 636 W HCPCS

## 2024-04-11 PROCEDURE — 99900035 HC TECH TIME PER 15 MIN (STAT)

## 2024-04-11 PROCEDURE — 87186 SC STD MICRODIL/AGAR DIL: CPT | Mod: 59 | Performed by: EMERGENCY MEDICINE

## 2024-04-11 PROCEDURE — 20600001 HC STEP DOWN PRIVATE ROOM

## 2024-04-11 PROCEDURE — 63600175 PHARM REV CODE 636 W HCPCS: Performed by: STUDENT IN AN ORGANIZED HEALTH CARE EDUCATION/TRAINING PROGRAM

## 2024-04-11 PROCEDURE — 99223 1ST HOSP IP/OBS HIGH 75: CPT | Mod: ,,, | Performed by: PHYSICIAN ASSISTANT

## 2024-04-11 PROCEDURE — 83605 ASSAY OF LACTIC ACID: CPT

## 2024-04-11 PROCEDURE — 81001 URINALYSIS AUTO W/SCOPE: CPT | Performed by: EMERGENCY MEDICINE

## 2024-04-11 PROCEDURE — 99285 EMERGENCY DEPT VISIT HI MDM: CPT

## 2024-04-11 PROCEDURE — 63600175 PHARM REV CODE 636 W HCPCS: Performed by: EMERGENCY MEDICINE

## 2024-04-11 PROCEDURE — 85025 COMPLETE CBC W/AUTO DIFF WBC: CPT

## 2024-04-11 PROCEDURE — 25000242 PHARM REV CODE 250 ALT 637 W/ HCPCS

## 2024-04-11 PROCEDURE — 80053 COMPREHEN METABOLIC PANEL: CPT | Performed by: EMERGENCY MEDICINE

## 2024-04-11 PROCEDURE — 27200966 HC CLOSED SUCTION SYSTEM

## 2024-04-11 PROCEDURE — 99900026 HC AIRWAY MAINTENANCE (STAT)

## 2024-04-11 PROCEDURE — 87088 URINE BACTERIA CULTURE: CPT | Performed by: EMERGENCY MEDICINE

## 2024-04-11 PROCEDURE — 25000003 PHARM REV CODE 250

## 2024-04-11 PROCEDURE — 25000003 PHARM REV CODE 250: Performed by: EMERGENCY MEDICINE

## 2024-04-11 PROCEDURE — 87077 CULTURE AEROBIC IDENTIFY: CPT | Performed by: EMERGENCY MEDICINE

## 2024-04-11 PROCEDURE — 25000003 PHARM REV CODE 250: Performed by: STUDENT IN AN ORGANIZED HEALTH CARE EDUCATION/TRAINING PROGRAM

## 2024-04-11 PROCEDURE — 87086 URINE CULTURE/COLONY COUNT: CPT | Performed by: EMERGENCY MEDICINE

## 2024-04-11 PROCEDURE — 99222 1ST HOSP IP/OBS MODERATE 55: CPT | Mod: ,,, | Performed by: NURSE PRACTITIONER

## 2024-04-11 PROCEDURE — 82962 GLUCOSE BLOOD TEST: CPT

## 2024-04-11 PROCEDURE — 96367 TX/PROPH/DG ADDL SEQ IV INF: CPT

## 2024-04-11 PROCEDURE — 27000207 HC ISOLATION

## 2024-04-11 PROCEDURE — 83605 ASSAY OF LACTIC ACID: CPT | Mod: 91

## 2024-04-11 RX ORDER — SODIUM CHLORIDE 9 MG/ML
INJECTION, SOLUTION INTRAVENOUS ONCE
Status: COMPLETED | OUTPATIENT
Start: 2024-04-12 | End: 2024-04-12

## 2024-04-11 RX ORDER — SEVELAMER CARBONATE FOR ORAL SUSPENSION 2400 MG/1
2400 POWDER, FOR SUSPENSION ORAL 3 TIMES DAILY
Status: ON HOLD | COMMUNITY
End: 2024-04-17 | Stop reason: HOSPADM

## 2024-04-11 RX ORDER — IBUPROFEN 200 MG
24 TABLET ORAL
Status: DISCONTINUED | OUTPATIENT
Start: 2024-04-11 | End: 2024-07-08 | Stop reason: HOSPADM

## 2024-04-11 RX ORDER — INSULIN ASPART 100 [IU]/ML
0-10 INJECTION, SOLUTION INTRAVENOUS; SUBCUTANEOUS EVERY 4 HOURS PRN
Status: DISCONTINUED | OUTPATIENT
Start: 2024-04-11 | End: 2024-04-17

## 2024-04-11 RX ORDER — ASCORBIC ACID 500 MG
500 TABLET ORAL 2 TIMES DAILY
Status: DISCONTINUED | OUTPATIENT
Start: 2024-04-11 | End: 2024-07-08 | Stop reason: HOSPADM

## 2024-04-11 RX ORDER — CHLORHEXIDINE GLUCONATE ORAL RINSE 1.2 MG/ML
15 SOLUTION DENTAL 2 TIMES DAILY
Status: ON HOLD | COMMUNITY
End: 2024-06-06 | Stop reason: HOSPADM

## 2024-04-11 RX ORDER — HEPARIN SODIUM 5000 [USP'U]/ML
7500 INJECTION, SOLUTION INTRAVENOUS; SUBCUTANEOUS EVERY 8 HOURS
Status: DISCONTINUED | OUTPATIENT
Start: 2024-04-11 | End: 2024-05-29

## 2024-04-11 RX ORDER — INSULIN ASPART 100 [IU]/ML
0-10 INJECTION, SOLUTION INTRAVENOUS; SUBCUTANEOUS
Status: DISCONTINUED | OUTPATIENT
Start: 2024-04-11 | End: 2024-04-11

## 2024-04-11 RX ORDER — PANTOPRAZOLE SODIUM 40 MG/1
40 FOR SUSPENSION ORAL DAILY
Status: DISCONTINUED | OUTPATIENT
Start: 2024-04-11 | End: 2024-07-08 | Stop reason: HOSPADM

## 2024-04-11 RX ORDER — SODIUM CHLORIDE, SODIUM LACTATE, POTASSIUM CHLORIDE, CALCIUM CHLORIDE 600; 310; 30; 20 MG/100ML; MG/100ML; MG/100ML; MG/100ML
INJECTION, SOLUTION INTRAVENOUS CONTINUOUS
Status: ACTIVE | OUTPATIENT
Start: 2024-04-11 | End: 2024-04-11

## 2024-04-11 RX ORDER — GLUCAGON 1 MG
1 KIT INJECTION
Status: DISCONTINUED | OUTPATIENT
Start: 2024-04-11 | End: 2024-07-08 | Stop reason: HOSPADM

## 2024-04-11 RX ORDER — SEVELAMER CARBONATE FOR ORAL SUSPENSION 2400 MG/1
2.4 POWDER, FOR SUSPENSION ORAL
Status: DISCONTINUED | OUTPATIENT
Start: 2024-04-11 | End: 2024-04-11

## 2024-04-11 RX ORDER — IBUPROFEN 200 MG
16 TABLET ORAL
Status: DISCONTINUED | OUTPATIENT
Start: 2024-04-11 | End: 2024-07-08 | Stop reason: HOSPADM

## 2024-04-11 RX ORDER — INSULIN ASPART 100 [IU]/ML
0-10 INJECTION, SOLUTION INTRAVENOUS; SUBCUTANEOUS
Status: ON HOLD | COMMUNITY
End: 2024-04-17 | Stop reason: HOSPADM

## 2024-04-11 RX ORDER — INSULIN ASPART 100 [IU]/ML
3 INJECTION, SOLUTION INTRAVENOUS; SUBCUTANEOUS ONCE
Status: COMPLETED | OUTPATIENT
Start: 2024-04-11 | End: 2024-04-11

## 2024-04-11 RX ORDER — NALOXONE HCL 0.4 MG/ML
0.02 VIAL (ML) INJECTION
Status: DISCONTINUED | OUTPATIENT
Start: 2024-04-11 | End: 2024-05-16

## 2024-04-11 RX ORDER — AMLODIPINE BESYLATE 10 MG/1
10 TABLET ORAL DAILY
Status: ON HOLD | COMMUNITY
End: 2024-04-17

## 2024-04-11 RX ORDER — MEROPENEM AND SODIUM CHLORIDE 500 MG/50ML
500 INJECTION, SOLUTION INTRAVENOUS EVERY 12 HOURS
Status: DISCONTINUED | OUTPATIENT
Start: 2024-04-11 | End: 2024-04-11

## 2024-04-11 RX ORDER — ZINC SULFATE 50(220)MG
220 CAPSULE ORAL DAILY
Status: ON HOLD | COMMUNITY
End: 2024-06-06 | Stop reason: HOSPADM

## 2024-04-11 RX ORDER — SEVELAMER CARBONATE FOR ORAL SUSPENSION 2400 MG/1
2.4 POWDER, FOR SUSPENSION ORAL 3 TIMES DAILY
Status: DISCONTINUED | OUTPATIENT
Start: 2024-04-11 | End: 2024-04-16

## 2024-04-11 RX ORDER — ASCORBIC ACID 500 MG
500 TABLET ORAL 2 TIMES DAILY
COMMUNITY

## 2024-04-11 RX ORDER — SODIUM CHLORIDE 0.9 % (FLUSH) 0.9 %
10 SYRINGE (ML) INJECTION EVERY 12 HOURS PRN
Status: DISCONTINUED | OUTPATIENT
Start: 2024-04-11 | End: 2024-07-08 | Stop reason: HOSPADM

## 2024-04-11 RX ORDER — ZINC SULFATE 50(220)MG
220 CAPSULE ORAL DAILY
Status: DISCONTINUED | OUTPATIENT
Start: 2024-04-11 | End: 2024-06-03

## 2024-04-11 RX ORDER — HEPARIN SODIUM 5000 [USP'U]/ML
7500 INJECTION, SOLUTION INTRAVENOUS; SUBCUTANEOUS EVERY 8 HOURS
Status: ON HOLD | COMMUNITY
End: 2024-04-17 | Stop reason: HOSPADM

## 2024-04-11 RX ORDER — DEXTROSE MONOHYDRATE 100 MG/ML
INJECTION, SOLUTION INTRAVENOUS CONTINUOUS PRN
Status: DISCONTINUED | OUTPATIENT
Start: 2024-04-11 | End: 2024-04-15

## 2024-04-11 RX ORDER — CARVEDILOL 25 MG/1
25 TABLET ORAL 2 TIMES DAILY WITH MEALS
Status: ON HOLD | COMMUNITY
End: 2024-04-17

## 2024-04-11 RX ORDER — CIPROFLOXACIN 2 MG/ML
400 INJECTION, SOLUTION INTRAVENOUS
Status: DISCONTINUED | OUTPATIENT
Start: 2024-04-11 | End: 2024-04-11

## 2024-04-11 RX ADMIN — HEPARIN SODIUM 7500 UNITS: 5000 INJECTION INTRAVENOUS; SUBCUTANEOUS at 09:04

## 2024-04-11 RX ADMIN — Medication 500 MG: at 09:04

## 2024-04-11 RX ADMIN — VANCOMYCIN HYDROCHLORIDE 2000 MG: 500 INJECTION, POWDER, LYOPHILIZED, FOR SOLUTION INTRAVENOUS at 01:04

## 2024-04-11 RX ADMIN — Medication 500 MG: at 10:04

## 2024-04-11 RX ADMIN — SODIUM CHLORIDE, POTASSIUM CHLORIDE, SODIUM LACTATE AND CALCIUM CHLORIDE: 600; 310; 30; 20 INJECTION, SOLUTION INTRAVENOUS at 05:04

## 2024-04-11 RX ADMIN — SEVELAMER CARBONATE 2.4 G: 2400 POWDER, FOR SUSPENSION ORAL at 03:04

## 2024-04-11 RX ADMIN — MEROPENEM 500 MG: 1 INJECTION INTRAVENOUS at 11:04

## 2024-04-11 RX ADMIN — PSYLLIUM HUSK 1 PACKET: 3.4 POWDER ORAL at 10:04

## 2024-04-11 RX ADMIN — HEPARIN SODIUM 7500 UNITS: 5000 INJECTION INTRAVENOUS; SUBCUTANEOUS at 03:04

## 2024-04-11 RX ADMIN — INSULIN DETEMIR 25 UNITS: 100 INJECTION, SOLUTION SUBCUTANEOUS at 11:04

## 2024-04-11 RX ADMIN — INSULIN ASPART 3 UNITS: 100 INJECTION, SOLUTION INTRAVENOUS; SUBCUTANEOUS at 02:04

## 2024-04-11 RX ADMIN — PANTOPRAZOLE SODIUM 40 MG: 40 GRANULE, DELAYED RELEASE ORAL at 10:04

## 2024-04-11 RX ADMIN — INSULIN DETEMIR 25 UNITS: 100 INJECTION, SOLUTION SUBCUTANEOUS at 09:04

## 2024-04-11 RX ADMIN — MEROPENEM 500 MG: 1 INJECTION INTRAVENOUS at 10:04

## 2024-04-11 RX ADMIN — ZINC SULFATE 220 MG (50 MG) CAPSULE 220 MG: CAPSULE at 10:04

## 2024-04-11 RX ADMIN — HEPARIN SODIUM 7500 UNITS: 5000 INJECTION INTRAVENOUS; SUBCUTANEOUS at 05:04

## 2024-04-11 RX ADMIN — SEVELAMER CARBONATE 2.4 G: 2400 POWDER, FOR SUSPENSION ORAL at 10:04

## 2024-04-11 NOTE — CONSULTS
[facility] Emergency Dept  Nephrology  Consult Note    Patient Name: Sarah Saravia  MRN: [id number]  Admission Date: 4/10/2024  Hospital Length of Stay: 0 days  Attending Provider: Hola Liriano MD   Primary Care Physician: Deanna, Primary Doctor  Principal Problem:Acute cystitis with hematuria    Inpatient consult to Nephrology  Consult performed by: Shwetha Gregory DNP, FNP-C  Consult ordered by: Kristopher Tyler DO  Reason for consult: ESRD      Inpatient consult to Nephrology  Consult performed by: Shwetha Gregory DNP, FNP-C  Consult ordered by: Jules Burgess MD  Reason for consult: ESRD        Subjective:     HPI: The patient is a 53 y.o. 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 The patient is unable to provide adequate history. Additional history and patient information was obtained from patient's daughter, past medical records and ER records. Per chart, she was undergoing dialysis Wednesday and was noted to be more lethargic than usual despite completing her HD session. EMS was called, fever of a 100. In the ED, UA 2+ leuks, >100 WBC, many bacteria, WBC 17, CT abd/pelvis concerning for cystitis. Given vanc/zosyn and admitted.     Of note, the patient was initiated on RRT in February 2024 after being admitted with ALVARADO 2/2 iATN due to septic shock. Perm cath placed on 2/29/24. She was transferred to LT on 3/12. Nephrology consulted for management of ESRD and HD treatment.      No past medical history on file.    Past Surgical History:   Procedure Laterality Date    CLOSURE OF WOUND Right 2/22/2024    Procedure: CLOSURE, WOUND;  Surgeon: Steve Cole MD;  Location: [facility] OR [address] [place] [address];  Service: General;  Laterality: Right;    ESOPHAGOGASTRODUODENOSCOPY W/ PEG N/A 2/22/2024    Procedure: EGD, WITH PEG TUBE INSERTION;  Surgeon:  Steve Cole MD;  Location: [facility] OR 2ND FLR;  Service: General;  Laterality: N/A;    INCISION AND DRAINAGE OF PERIRECTAL REGION N/A 1/29/2024    Procedure: INCISION AND DRAINAGE, PERIRECTAL REGION;  Surgeon: Axel Ramsay MD;  Location: [facility] OR;  Service: General;  Laterality: N/A;    LUMBAR PUNCTURE N/A 2/16/2024    Procedure: Lumbar Puncture;  Surgeon: Macy Boykin;  Location: [facility];  Service: Anesthesiology;  Laterality: N/A;    PLACEMENT, TRIALYSIS CATH Right 1/31/2024    Procedure: INSERTION, CATHETER, TRIPLE LUMEN, HEMODIALYSIS, TEMPORARY;  Surgeon: Alvin Junior MD;  Location: [facility] OR;  Service: General;  Laterality: Right;    REPLACEMENT OF WOUND VACUUM-ASSISTED CLOSURE DEVICE Right 2/12/2024    Procedure: REPLACEMENT, WOUND VAC;  Surgeon: Mundo Carmona MD;  Location: [facility] OR [facility] FLR;  Service: General;  Laterality: Right;    REPLACEMENT OF WOUND VACUUM-ASSISTED CLOSURE DEVICE N/A 2/15/2024    Procedure: REPLACEMENT, WOUND VAC;  Surgeon: Steve Cole MD;  [facility];  Service: General;  Laterality: N/A;    REPLACEMENT OF WOUND VACUUM-ASSISTED CLOSURE DEVICE Right 2/19/2024    Procedure: REPLACEMENT, WOUND VAC;  Surgeon: Steve Cole MD;  Location: [facility] OR 2ND FLR;  Service: General;  Laterality: Right;  RLE/groin    REPLACEMENT OF WOUND VACUUM-ASSISTED CLOSURE DEVICE Right 2/22/2024    Procedure: REPLACEMENT, WOUND VAC;  Surgeon: Steve Cole MD;  [facility];  Service: General;  Laterality: Right;    TRACHEOSTOMY N/A 2/22/2024    Procedure: CREATION, TRACHEOSTOMY;  Surgeon: Steve Cole MD;  Location: [facility] OR [facility] FLR;  Service: General;  Laterality: N/A;    WOUND DEBRIDEMENT Bilateral 2/2/2024    Procedure: DEBRIDEMENT, WOUND;  Surgeon: Steve Cole MD;  Location: [facility] OR 2ND FLR;  Service: General;  Laterality: Bilateral;  Bilateral groin  Possible wound vac placement    WOUND DEBRIDEMENT Right 2/6/2024    Procedure: DEBRIDEMENT, WOUND, replace  wound vac, possible closure;  Surgeon: Steve Cole MD;  Location: [facility] OR 2ND FLR;  Service: General;  Laterality: Right;  RLE    WOUND DEBRIDEMENT Right 2/9/2024    Procedure: DEBRIDEMENT, WOUND w wound vac change;  Surgeon: Steve Cole MD;  Location: [facility] OR 2ND FLR;  Service: General;  Laterality: Right;  RLE    WOUND DEBRIDEMENT Right 2/12/2024    Procedure: R thigh wound debridement;  Surgeon: Mundo Carmona MD;  [facility];  Service: General;  Laterality: Right;    WOUND EXPLORATION Right 1/31/2024    Procedure: IRRIGATION & DEBRIDEMENT, WOUND DEBRIDEMENT;  Surgeon: Alvin Junior MD;  Location: [facility] OR;  Service: General;  Laterality: Right;       Review of patient's allergies indicates:  No Known Allergies  Current Facility-Administered Medications   Medication Frequency    [START ON 4/12/2024] 0.9%  NaCl infusion Once    ascorbic acid (vitamin C) tablet 500 mg BID    dextrose 10% bolus 125 mL 125 mL PRN    dextrose 10% bolus 250 mL 250 mL PRN    glucagon (human recombinant) injection 1 mg PRN    glucose chewable tablet 16 g PRN    glucose chewable tablet 24 g PRN    heparin (porcine) injection 7,500 Units Q8H    insulin aspart U-100 injection 0-10 Units PRN    insulin detemir U-100 (Levemir) pen 25 Units BID    lactated ringers infusion Continuous    meropenem (MERREM) 500 mg in sodium chloride 0.9 % 100 mL IVPB (MB+) Q12H    naloxone 0.4 mg/mL injection 0.02 mg PRN    pantoprazole suspension 40 mg Daily    psyllium husk (aspartame) 3.4 gram packet 1 packet Daily    sevelamer carbonate 2.4 gram powder 2.4 g TID    sodium chloride 0.9% flush 10 mL Q12H PRN    vancomycin - pharmacy to dose pharmacy to manage frequency    zinc sulfate capsule 220 mg Daily     Current Outpatient Medications   Medication    amLODIPine (NORVASC) 10 MG tablet    ascorbic acid, vitamin C, (VITAMIN C) 500 MG tablet    carvediloL (COREG) 25 MG tablet    heparin sodium,porcine (HEPARIN, PORCINE,) 5,000  unit/mL injection    insulin aspart U-100 (NOVOLOG) 100 unit/mL injection    insulin detemir U-100 (LEVEMIR) 100 unit/mL injection    pantoprazole sodium (PANTOPRAZOLE ORAL)    psyllium (KONSYL) Powd    sevelamer carbonate (RENVELA) 2.4 gram PwPk    zinc sulfate (ZINCATE) 50 mg zinc (220 mg) capsule     Facility-Administered Medications Ordered in Other Encounters   Medication Frequency    [MAR Hold - Suspended Admission] 0.9%  NaCl infusion PRN    [MAR Hold - Suspended Admission] 0.9%  NaCl infusion Once    [MAR Hold - Suspended Admission] acetaminophen 160 mg/5 mL (5 mL) liquid (ADULTS) 649.6 mg Q4H PRN    [MAR Hold - Suspended Admission] albuterol-ipratropium 2.5 mg-0.5 mg/3 mL nebulizer solution 3 mL Q4H PRN    [MAR Hold - Suspended Admission] amLODIPine tablet 10 mg Daily    [MAR Hold - Suspended Admission] ascorbic acid (vitamin C) tablet 500 mg BID    [MAR Hold - Suspended Admission] carvediloL tablet 25 mg BID    [MAR Hold - Suspended Admission] chlorhexidine 0.12 % solution 15 mL BID    [MAR Hold - Suspended Admission] dextrose 10% bolus 125 mL 125 mL PRN    [MAR Hold - Suspended Admission] dextrose 10% bolus 250 mL 250 mL PRN    [MAR Hold - Suspended Admission] glucagon (human recombinant) injection 1 mg PRN    [MAR Hold - Suspended Admission] heparin (porcine) injection 1,000 Units PRN    [MAR Hold - Suspended Admission] heparin (porcine) injection 7,500 Units Q8H    [MAR Hold - Suspended Admission] hydrALAZINE tablet 25 mg Q8H PRN    [MAR Hold - Suspended Admission] insulin aspart U-100 pen 0-10 Units Q6H PRN    [MAR Hold - Suspended Admission] insulin detemir U-100 (Levemir) pen 25 Units BID    [MAR Hold - Suspended Admission] labetaloL injection 10 mg Q6H PRN    [MAR Hold - Suspended Admission] loperamide capsule 2 mg QID PRN    [MAR Hold - Suspended Admission] naloxone 0.4 mg/mL injection 0.02 mg PRN    [MAR Hold - Suspended Admission] ondansetron injection 4 mg Q6H PRN    [MAR Hold - Suspended  Admission] pantoprazole suspension 40 mg Daily    [MAR Hold - Suspended Admission] prochlorperazine injection Soln 5 mg Q6H PRN    [MAR Hold - Suspended Admission] psyllium husk (aspartame) 3.4 gram packet 1 packet Daily    [MAR Hold - Suspended Admission] sevelamer carbonate pwpk 2.4 g TID    [MAR Hold - Suspended Admission] sodium chloride 0.9% bolus 250 mL 250 mL PRN    [MAR Hold - Suspended Admission] sodium chloride 0.9% flush 10 mL Q12H PRN    [MAR Hold - Suspended Admission] zinc sulfate capsule 220 mg Daily     Family History    None       Tobacco Use    Smoking status: Unknown    Smokeless tobacco: Not on file   Substance and Sexual Activity    Alcohol use: Not on file    Drug use: Not on file    Sexual activity: Not on file     Review of Systems   Unable to perform ROS: Patient nonverbal     Objective:     Vital Signs (Most Recent):  Temp: 98.6 °F (37 °C) (04/11/24 0558)  Pulse: 97 (04/11/24 1020)  Resp: 20 (04/11/24 1020)  BP: 112/77 (04/11/24 0900)  SpO2: 98 % (04/11/24 1020) Vital Signs (24h Range):  Temp:  [96.9 °F (36.1 °C)-100 °F (37.8 °C)] 98.6 °F (37 °C)  Pulse:  [] 97  Resp:  [11-40] 20  SpO2:  [95 %-100 %] 98 %  BP: ()/(54-91) 112/77     Weight: 122.5 kg (270 lb 1 oz) (04/11/24 0225)  Body mass index is 42.3 kg/m².  Body surface area is 2.41 meters squared.    I/O last 3 completed shifts:  In: 1760 [Other:500; NG/GT:660; IV Piggyback:600]  Out: 2000 [Other:2000]     Physical Exam  Constitutional:       General: She is not in acute distress.     Appearance: She is ill-appearing.      Comments: Trach in place, no purulence    HENT:      Head: Normocephalic and atraumatic.   Eyes:      General: No scleral icterus.     Extraocular Movements: Extraocular movements intact.   Cardiovascular:      Rate and Rhythm: Normal rate and regular rhythm.   Pulmonary:      Effort: Pulmonary effort is normal. No respiratory distress.   Abdominal:      General: Abdomen is flat. There is no distension.       Palpations: Abdomen is soft.      Tenderness: There is no abdominal tenderness.      Comments: Peg tube in place, no purulence noted   Musculoskeletal:      Right lower leg: No edema.      Left lower leg: No edema.   Skin:     General: Skin is warm and dry.      Findings: Wound present.   Neurological:      Mental Status: She is alert.      Comments: Nonverbal   Psychiatric:      Comments: Nonverbal          Significant Labs:  CBC:   Recent Labs   Lab 04/10/24  2243   WBC 17.15*   RBC 4.07   HGB 11.4*   HCT 36.1*      MCV 89   MCH 28.0   MCHC 31.6*     CMP:   Recent Labs   Lab 04/11/24  0101   *   CALCIUM 10.7*   ALBUMIN 3.4*   PROT 9.7*   *   K 4.7   CO2 22*   CL 95   BUN 72*   CREATININE 5.8*   ALKPHOS 137*   ALT 33   AST 25   BILITOT 0.3     All labs within the past 24 hours have been reviewed.    Assessment/Plan:     Renal/  * Acute cystitis with hematuria  - defer to primary     ESRD (end stage renal disease) on dialysis  53 y.o. Black or  Female ESRD-HD M-W-F at [facility] presents to ED on 4/10/2024 with UTI.    Of note, the patient was initiated on RRT in February 2024 after being admitted with ALVARADO 2/2 iATN due to septic shock. Perm cath placed on 2/29/24. She was transferred to [facility] on 3/12. Deemed ESRD at [facility].  Nephrology consulted for inpatient ESRD-HD management    Assessment:   - Dialysis for metabolic clearance and volume management will be provided tomorrow AM.  - Labs reviewed and dialysate to be adjusted to current labs.   - Continue to monitor intake and output  - Please avoid gadolinium, fleets, phos-based laxatives, NSAIDs  - Dialysis thrice weekly unless more urgent indications arise. Will evaluate RRT requirements Daily.    Anemia of ESRD   Recent Labs   Lab 04/08/24  0442 04/10/24  0429 04/10/24  2243   WBC 13.63* 14.83* 17.15*   HGB 9.8* 10.7* 11.4*   HCT 31.3* 34.2* 36.1*    366 297     Lab Results   Component Value Date    FESATURATED 10 (L)  03/15/2024    FERRITIN 414 (H) 03/15/2024       - Goal in ESRD is Hgb of 10-11.   - No EPO    Mineral Bone Disease in ESRD   Lab Results   Component Value Date    CALCIUM 10.7 (H) 04/11/2024    ALBUMIN 3.4 (L) 04/11/2024    CAION 1.12 02/21/2024    PHOS 6.6 (H) 04/10/2024       - F/U PO4, Mg, Calcium. And albumin levels daily.   - Renal diet with protein intake goal 1.5 g/kg/d with 1 L fluid restriction   - Restart home phos binder, phos 6.6      Ayaz's gangrene  - Per chart         Thank you for your consult. I will follow-up with patient. Please contact us if you have any additional questions.    Shwetha Gregory, POONAM, FNP-C  Nephrology  [facility] - [facility] Dept

## 2024-04-11 NOTE — ASSESSMENT & PLAN NOTE
Sarah Saravia is a 53 y.o. female with a significant medical history of obesity, necrotizing fascitis s/p surgical debridement (1/30/2024) s/p wound vac who presents to the hospital as a transfer from [facility] for evaluation of AMS. At baseline the patient is nonverbal and does not follow commands but tracks movement with her eyes.  Tonight she was reported to be lethargic, not tracking with her eyes. Her family report she has been having an intermittent cough that has been different from her usual cough and fever a few days ago.    -NIHSS 22. Review of the patient's medical record shows she was seen by this team prior to discharge and had an NIHSS 29 at that time.  -CTH is negative for acute findings. She is not a candidate for IV thrombolytic due to recent stroke, likely stroke mimic.  -She was not recommended to have antithrombotics due to concern for mycotic aneurysm   -The patient's temp in the ED is 99.1 (axillary). Labs are pending.  -DDx includes but is not limited to: infection, seizure (the patient had some intermittent twitching of the left side of her face and the fingers on her left hand during this assessment)

## 2024-04-11 NOTE — ASSESSMENT & PLAN NOTE
Creatine stable for now. BMP reviewed- noted Estimated Creatinine Clearance: 15.2 mL/min (A) (based on SCr of 5.8 mg/dL (H)). according to latest data. Based on current GFR, CKD stage is end stage.  Monitor UOP and serial BMP and adjust therapy as needed. Renally dose meds. Avoid nephrotoxic medications and procedures.    Pt MWF HD, underwent on 4/10, complete session  Plan  Nephrology consult  Careful with IV fluid  Monitor fluid status

## 2024-04-11 NOTE — ASSESSMENT & PLAN NOTE
53 y.o. Black or  Female ESRD-HD M-W-F at [facility] presents to ED on 4/10/2024 with UTI.    Of note, the patient was initiated on RRT in February 2024 after being admitted with ALVARADO 2/2 iATN due to septic shock. Perm cath placed on 2/29/24. She was transferred to [facility] on 3/12. Deemed ESRD at [facility].  Nephrology consulted for inpatient ESRD-HD management    Assessment:   - Dialysis for metabolic clearance and volume management will be provided tomorrow AM.  - Labs reviewed and dialysate to be adjusted to current labs.   - Continue to monitor intake and output  - Please avoid gadolinium, fleets, phos-based laxatives, NSAIDs  - Dialysis thrice weekly unless more urgent indications arise. Will evaluate RRT requirements Daily.    Anemia of ESRD   Recent Labs   Lab 04/08/24  0442 04/10/24  0429 04/10/24  2243   WBC 13.63* 14.83* 17.15*   HGB 9.8* 10.7* 11.4*   HCT 31.3* 34.2* 36.1*    366 297     Lab Results   Component Value Date    FESATURATED 10 (L) 03/15/2024    FERRITIN 414 (H) 03/15/2024       - Goal in ESRD is Hgb of 10-11.   - No EPO    Mineral Bone Disease in ESRD   Lab Results   Component Value Date    CALCIUM 10.7 (H) 04/11/2024    ALBUMIN 3.4 (L) 04/11/2024    CAION 1.12 02/21/2024    PHOS 6.6 (H) 04/10/2024       - F/U PO4, Mg, Calcium. And albumin levels daily.   - Renal diet with protein intake goal 1.5 g/kg/d with 1 L fluid restriction   - Restart home phos binder, phos 6.6

## 2024-04-11 NOTE — CLINICAL REVIEW
IP Sepsis Screen (most recent)       Sepsis Screen (IP) - 04/11/24 1311       Is the patient's history or complaint suggestive of a possible infection? Yes  -DD    Are there at least two of the following signs and symptoms present? Yes  -DD    Sepsis signs/symptoms - Tachycardia Tachycardia     >90  -DD    Sepsis signs/symptoms - WBC WBC < 4,000 or WBC > 12,000  -DD    Are any of the following organ dysfunction criteria present and not considered to be due to a chronic condition? Yes  -DD    Organ Dysfunction Criteria Lactate > 2.0  -DD    Initiate Sepsis Protocol No  -DD    Reason sepsis not considered Pt. receiving appropriate management  -DD              User Key  (r) = Recorded By, (t) = Taken By, (c) = Cosigned By      Initials Name    Flash Freire, ELISABETH

## 2024-04-11 NOTE — PROGRESS NOTES
Pharmacokinetic Initial Assessment: IV Vancomycin    Assessment/Plan:    Initiate intravenous vancomycin with loading dose of 2000 mg once with subsequent doses when random concentrations are less than 20 mcg/mL  Desired empiric serum trough concentration is 10 to 20 mcg/mL  Draw vancomycin random level on 4/12 at 0400 with AM labs.  Pharmacy will continue to follow and monitor vancomycin.      Please contact pharmacy at extension [phone] with any questions regarding this assessment.     Thank you for the consult,   Kathe Ingram       Patient brief summary:  Sarah Saravia is a 53 y.o. female initiated on antimicrobial therapy with IV Vancomycin for treatment of suspected urinary tract infection    Drug Allergies:   Review of patient's allergies indicates:  No Known Allergies    Actual Body Weight:   122.5 kg    Renal Function:   Estimated Creatinine Clearance: 15.2 mL/min (A) (based on SCr of 5.8 mg/dL (H)).    Dialysis Method (if applicable):  intermittent HD    CBC (last 72 hours):  Recent Labs   Lab Result Units 04/08/24  0442 04/10/24  0429 04/10/24  2243   WBC K/uL 13.63* 14.83* 17.15*   Hemoglobin g/dL 9.8* 10.7* 11.4*   Hematocrit % 31.3* 34.2* 36.1*   Platelets K/uL 328 366 297   Gran % %  --   --  70.3   Lymph % %  --   --  18.3   Mono % %  --   --  8.0   Eosinophil % %  --   --  2.2   Basophil % %  --   --  0.4   Differential Method   --   --  Automated       Metabolic Panel (last 72 hours):  Recent Labs   Lab Result Units 04/08/24  0442 04/10/24  0429 04/11/24  0101 04/11/24  0103   Sodium mmol/L 133* 135* 135*  --    Potassium mmol/L 4.8 5.1 4.7  --    Chloride mmol/L 95 95 95  --    CO2 mmol/L 18* 19* 22*  --    Glucose mg/dL 224* 244* 306*  --    Glucose, UA   --   --   --  1+*   BUN mg/dL 114* 113* 72*  --    Creatinine mg/dL 7.8* 8.4* 5.8*  --    Albumin g/dL 3.3*  3.3* 3.6  3.6 3.4*  --    Total Bilirubin mg/dL 0.2 0.2 0.3  --    Alkaline Phosphatase U/L 145* 149* 137*  --    AST U/L 18 19 25   "--    ALT U/L 33 34 33  --    Magnesium mg/dL 3.1* 3.2*  --   --    Phosphorus mg/dL 6.6* 6.6*  --   --        Drug levels (last 3 results):  No results for input(s): "VANCOMYCINRA", "VANCORANDOM", "VANCOMYCINPE", "VANCOPEAK", "VANCOMYCINTR", "VANCOTROUGH" in the last 72 hours.    Microbiologic Results:  Microbiology Results (last 7 days)       Procedure Component Value Units Date/Time    Urine culture [[id number]] Collected: 04/11/24 0103    Order Status: No result Specimen: Urine Updated: 04/11/24 0142    Blood culture x two cultures. Draw prior to antibiotics. [[id number]] Collected: 04/10/24 2243    Order Status: Sent Specimen: Blood from Peripheral, Hand, Right Updated: 04/10/24 2303    Blood culture x two cultures. Draw prior to antibiotics. [[id number]] Collected: 04/10/24 2243    Order Status: Sent Specimen: Blood from Peripheral, Antecubital, Right Updated: 04/10/24 2303            "

## 2024-04-11 NOTE — HPI
Sarah Saravia is a 53 y.o. female with a significant medical history of obesity, necrotizing fascitis s/p surgical debridement (1/30/2024) s/p wound vac who presents to the hospital as a transfer from [facility] for evaluation of AMS.  HPI information gathered from review of the patient's medical record, EMS personnel, patient's family, LTAC notes due to the patient having aphasia.  At baseline the patient is nonverbal and does not follow commands but tracks movement with her eyes.  Tonight she was reported to be lethargic, not tracking with her eyes.  She was sent to the ED where a stroke code was activated.    On my arrival to the ED the patient's eyes are open.  She has a trach collar, wound VAC, and PEG.  Per her family the patient has been having intermittent cough for the past 2 days that have been different from her baseline cough.  They also state the patient had a fever a few days ago.

## 2024-04-11 NOTE — ASSESSMENT & PLAN NOTE
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 Pt underwent extensive course, currently still healing from this, but no longer has wound vac. Pt's wound currently covered with bandage. Picture in media tabs    Plan  Wound care

## 2024-04-11 NOTE — CONSULTS
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 Prior cultures w/ proteus mirabilis and bacteroides.  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 she completed a course of meropenem for SSTI of right groin wound on 3/21/24 as repeat wound cultures +pseudomonas (cefepime, zosyn resistant). She is admitted from [facility] for AMS and fevers.     CT abdomen/pelvis concerning for cystitis as well as a UA (obtained via straight cath) concerning for a UTI. She was initially started on vancomycin and Zosyn later broadened to meropenem. Tmax 100, WBC 17K on admission. She is currently on vancomycin and meroepnem. Blood cultures NGTD. CXR negaitve for focal consolidations.       Recommendations  Continue meropenem . D/c vancomycin  Will follow urine cultures and tailor abx accordingly  F/u blood cultures monitor for clinical improvement  Continue local wound care    Discussed with ID staff. ID will follow closely with you         Thank you for your consult. I will follow-up with patient. Please contact us if you have any additional questions.    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 Reportedly patient was less responsive than her baseline had an episode of emesis, in the ED she had a CT abdomen/pelvis concerning for cystitis as well as a UA (obtained via straight cath) concerning for a UTI. She was initially started on vancomycin and Zosyn later broadened to meropenem. On presentation her labs were notable for leukocytosis of 17, as well as a lactic acidosis of 3.9 that has improved to 2.4 with the administration of 1 L of IV fluids, we are being cautious with fluid repletion given her end-stage renal disease and oliguric status. Infectious diseases consulted for her history of multiple drug-resistant organisms.       She completed meropenem on 3/21/24 for right groin wound infection. She is followed by wound care closely. Wuond appear stable without active signs of infection. Tmax 100, WBC 17K on admission. She is currently on vancomycin and meroepnem. Blood cultures NGTD. CXR negaitve for focal consolidations.     No past medical history on file.    Past Surgical History:   Procedure Laterality Date    CLOSURE OF WOUND Right 2/22/2024    Procedure: CLOSURE, WOUND;  Surgeon: Steve Cole MD;  Location: [address];  Service: General;  Laterality: Right;    ESOPHAGOGASTRODUODENOSCOPY W/ PEG N/A 2/22/2024    Procedure: EGD, WITH PEG TUBE INSERTION;  Surgeon: Steve Cole MD;  Location: [address];  Service: General;  Laterality: N/A;    INCISION AND DRAINAGE OF PERIRECTAL REGION N/A 1/29/2024    Procedure: INCISION AND DRAINAGE, PERIRECTAL REGION;  Surgeon: Axel Ramsay MD;  Location: [facility];  Service: General;  Laterality: N/A;    LUMBAR PUNCTURE N/A 2/16/2024    Procedure: Lumbar Puncture;  Surgeon: Macy Boykin;  [facility];  Service: Anesthesiology;  Laterality: N/A;    PLACEMENT, TRIALYSIS CATH Right 1/31/2024    Procedure: INSERTION, CATHETER, TRIPLE LUMEN, HEMODIALYSIS, TEMPORARY;  Surgeon: Alvin Junior MD;  [facility]: [facility];  Service: General;  Laterality: Right;    REPLACEMENT OF WOUND VACUUM-ASSISTED CLOSURE DEVICE Right 2/12/2024    Procedure: REPLACEMENT, WOUND VAC;  Surgeon: Mundo Carmona MD;  [facility];  Service: General;  Laterality: Right;    REPLACEMENT OF WOUND VACUUM-ASSISTED CLOSURE DEVICE N/A 2/15/2024    Procedure: REPLACEMENT, WOUND VAC;  Surgeon: Steve Cole MD;  [facility];  Service: General;  Laterality: N/A;    REPLACEMENT OF WOUND VACUUM-ASSISTED CLOSURE DEVICE Right 2/19/2024    Procedure: REPLACEMENT, WOUND VAC;  Surgeon: Steve Cole MD;  [facility];  Service: General;  Laterality: Right;  RLE/groin    REPLACEMENT OF WOUND VACUUM-ASSISTED CLOSURE DEVICE Right 2/22/2024    Procedure: REPLACEMENT, WOUND VAC;  Surgeon: Steve Cole MD;  [facility];  Service: General;  Laterality: Right;    TRACHEOSTOMY N/A 2/22/2024    Procedure: CREATION, TRACHEOSTOMY;  Surgeon: Steve Cole MD;  [facility];  Service: General;  Laterality: N/A;    WOUND DEBRIDEMENT Bilateral 2/2/2024    Procedure: DEBRIDEMENT, WOUND;  Surgeon: Steve Cole MD;  [facility];  Service: General;  Laterality: Bilateral;  Bilateral groin  Possible wound vac placement    WOUND DEBRIDEMENT Right 2/6/2024    Procedure: DEBRIDEMENT, WOUND, replace wound vac, possible closure;  Surgeon: Steve Cole MD;  [facility];  Service: General;  Laterality: Right;  RLE    WOUND DEBRIDEMENT Right 2/9/2024    Procedure: DEBRIDEMENT, WOUND w wound vac change;  Surgeon: Steve Cole MD;  [facility];  Service: General;  Laterality: Right;  RLE    WOUND DEBRIDEMENT Right 2/12/2024    Procedure: R thigh wound  debridement;  Surgeon: Mundo Carmona MD;  [facility];  Service: General;  Laterality: Right;    WOUND EXPLORATION Right 1/31/2024    Procedure: IRRIGATION & DEBRIDEMENT, WOUND DEBRIDEMENT;  Surgeon: Alvin Junior MD;  Location: [facility] OR;  Service: General;  Laterality: Right;       Review of patient's allergies indicates:  No Known Allergies    Medications:  (Not in a hospital admission)    Antibiotics (From admission, onward)      Start     Stop Route Frequency Ordered    04/11/24 1100  meropenem (MERREM) 500 mg in sodium chloride 0.9 % 100 mL IVPB (MB+)         -- IV Every 12 hours 04/11/24 0952          Antifungals (From admission, onward)      None          Antivirals (From admission, onward)      None             Immunization History   Administered Date(s) Administered    COVID-19, MRNA, LN-S, PF (Pfizer) (Purple Cap) 05/24/2021, 06/14/2021    PPD Test 08/16/2022    Td (ADULT) 11/16/2005       Family History    None       Social History     Socioeconomic History    Marital status: Single   Tobacco Use    Smoking status: Unknown     Social Determinants of Health     Financial Resource Strain: Patient Unable To Answer (3/14/2024)    Overall Financial Resource Strain (CARDIA)     Difficulty of Paying Living Expenses: Patient unable to answer   Recent Concern: Financial Resource Strain - High Risk (3/9/2024)    Overall Financial Resource Strain (CARDIA)     Difficulty of Paying Living Expenses: Very hard   Food Insecurity: Patient Unable To Answer (3/14/2024)    Hunger Vital Sign     Worried About Running Out of Food in the Last Year: Patient unable to answer     Ran Out of Food in the Last Year: Patient unable to answer   Transportation Needs: Patient Unable To Answer (3/14/2024)    PRAPARE - Transportation     Lack of Transportation (Medical): Patient unable to answer     Lack of Transportation (Non-Medical): Patient unable to answer   Physical Activity: Inactive (3/13/2024)    Exercise  Vital Sign     Days of Exercise per Week: 0 days     Minutes of Exercise per Session: 0 min   Stress: Patient Unable To Answer (3/14/2024)    [employer] - Occupational Stress Questionnaire     Feeling of Stress : Patient unable to answer   Social Connections: Socially Isolated (3/13/2024)    Social Connection and Isolation Panel [NHANES]     Frequency of Communication with Friends and Family: More than three times a week     Frequency of Social Gatherings with Friends and Family: More than three times a week     Attends Christian Services: Never     Active Member of Clubs or Organizations: No     Attends Club or Organization Meetings: Never     Marital Status: Never    Housing Stability: Patient Unable To Answer (3/14/2024)    Housing Stability Vital Sign     Unable to Pay for Housing in the Last Year: Patient unable to answer     Number of Places Lived in the Last Year: 1     Unstable Housing in the Last Year: Patient unable to answer   Recent Concern: Housing Stability - High Risk (3/9/2024)    Housing Stability Vital Sign     Unable to Pay for Housing in the Last Year: Yes     Unstable Housing in the Last Year: No     Review of Systems   Unable to perform ROS: Patient nonverbal     Objective:     Vital Signs (Most Recent):  Temp: 98.6 °F (37 °C) (04/11/24 0558)  Pulse: 99 (04/11/24 1300)  Resp: 20 (04/11/24 1345)  BP: 111/77 (04/11/24 1300)  SpO2: 98 % (04/11/24 1300) Vital Signs (24h Range):  Temp:  [96.9 °F (36.1 °C)-100 °F (37.8 °C)] 98.6 °F (37 °C)  Pulse:  [] 99  Resp:  [11-40] 20  SpO2:  [95 %-100 %] 98 %  BP: (104-132)/(63-91) 111/77     Weight: 122.5 kg (270 lb 1 oz)  Body mass index is 42.3 kg/m².    Estimated Creatinine Clearance: 15.2 mL/min (A) (based on SCr of 5.8 mg/dL (H)).     Physical Exam  Vitals and nursing note reviewed.   Constitutional:       Appearance: She is well-developed.   HENT:      Head: Normocephalic and atraumatic.   Eyes:      Pupils:  Pupils are equal, round, and reactive to light.   Neck:      Comments: Trach site   Cardiovascular:      Rate and Rhythm: Normal rate and regular rhythm.      Heart sounds: Normal heart sounds.   Pulmonary:      Effort: Pulmonary effort is normal. No respiratory distress.      Breath sounds: Normal breath sounds. No wheezing, rhonchi or rales.   Abdominal:      General: Bowel sounds are normal. There is no distension.      Palpations: Abdomen is soft. There is no mass.      Tenderness: There is no abdominal tenderness.   Musculoskeletal:      Cervical back: Normal range of motion and neck supple.   Skin:     General: Skin is warm and dry.      Coloration: Skin is not pale.      Findings: No erythema.      Comments: Right groin wound c/d/I. No purulent drainage    RIJ line for HD       Neurological:      Mental Status: She is alert.          Significant Labs: All pertinent labs within the past 24 hours have been reviewed.    Significant Imaging: I have reviewed all pertinent imaging results/findings within the past 24 hours.

## 2024-04-11 NOTE — ASSESSMENT & PLAN NOTE
-Stroke risk factor. Last A1c 10.4 (1/30/2024).  -Glucose on labs 244.  -If hospitalized recommend tight glucose control, glucose 140-180.

## 2024-04-11 NOTE — ED NOTES
Pt moved to hospital bed at this time for comfort. Pillows placed under bilateral knees and neck to decrease pressure to area.

## 2024-04-11 NOTE — ASSESSMENT & PLAN NOTE
-Patient currently receiving dialysis MWF per family.  -She was last dialyzed today.  -Avoid nephrotoxins if possible.

## 2024-04-11 NOTE — PLAN OF CARE
Briefly  Patient is 53 yrs old women with hx Ayaz's gangrene  necrotizing fascitis s/p surgical debridement (1/30/2024) s/p wound vac, CVA a month ago non verbal on PEG/tracheostomy, ESRD on HD presented from LTAC due to worsen AMS and fever  (described as not tracking with eyes) baseline non verbal and not tracking. CTH done with no new changes. Work up with WBC 17, LA 3.9,Cr.8.4 and UA with UTI. Also CT abdomen shows concern for cystitis. Started on Broad spectrum Abx. Vascular neurology consulted for stroke rule out.   Patient seen 1 month ago for MRI with multifocal areas of restricted diffusion in both hemispheres involving multiple vascular territories with additional larger left anterior temporal focus w/ vasogenic edema. There was a concern about septic emboli but JAY ruled out endocarditis.   Patient presentation currently suggest Infection/metabolic etiology of her AMS. Storke can not ruled out as well giving the complicated history.    -Consider MRI brain to rule out new stroke  -Ok to Start ASA 81 mg daily       #Stroke prevention recommendations   - Low-sodium DASH diet   - Outpatient goal for secondary stroke prevention: systolic <140  - Outpatient goal for secondary stroke prevention: LDL <70, HDL >40 for men & HDL >50 for women  - Recommended at least 30 minutes of cardio-aerobic exercise 3x/week     VN will Sign off , If MRI done and there are new findings please call for further recommendations.

## 2024-04-11 NOTE — SUBJECTIVE & OBJECTIVE
No past medical history on file.    Past Surgical History:   Procedure Laterality Date    CLOSURE OF WOUND Right 2/22/2024    Procedure: CLOSURE, WOUND;  Surgeon: Steve Cole MD;  [facility];  Service: General;  Laterality: Right;    ESOPHAGOGASTRODUODENOSCOPY W/ PEG N/A 2/22/2024    Procedure: EGD, WITH PEG TUBE INSERTION;  Surgeon: Steve Cole MD;  Location: [facility] OR [facility];  Service: General;  Laterality: N/A;    INCISION AND DRAINAGE OF PERIRECTAL REGION N/A 1/29/2024    Procedure: INCISION AND DRAINAGE, PERIRECTAL REGION;  Surgeon: Axel Ramsay MD;  Location: [facility] OR;  Service: General;  Laterality: N/A;    LUMBAR PUNCTURE N/A 2/16/2024    Procedure: Lumbar Puncture;  Surgeon: Macy Boykin;  Location: [facility];  Service: Anesthesiology;  Laterality: N/A;    PLACEMENT, TRIALYSIS CATH Right 1/31/2024    Procedure: INSERTION, CATHETER, TRIPLE LUMEN, HEMODIALYSIS, TEMPORARY;  Surgeon: Alvin Junior MD;  Location: [facility] OR;  Service: General;  Laterality: Right;    REPLACEMENT OF WOUND VACUUM-ASSISTED CLOSURE DEVICE Right 2/12/2024    Procedure: REPLACEMENT, WOUND VAC;  Surgeon: Mundo Carmona MD;  [facility];  Service: General;  Laterality: Right;    REPLACEMENT OF WOUND VACUUM-ASSISTED CLOSURE DEVICE N/A 2/15/2024    Procedure: REPLACEMENT, WOUND VAC;  Surgeon: Steve Cole MD;  [facility];  Service: General;  Laterality: N/A;    REPLACEMENT OF WOUND VACUUM-ASSISTED CLOSURE DEVICE Right 2/19/2024    Procedure: REPLACEMENT, WOUND VAC;  Surgeon: Steve Cole MD;  Location: [facility] OR [facility];  Service: General;  Laterality: Right;  RLE/groin    REPLACEMENT OF WOUND VACUUM-ASSISTED CLOSURE DEVICE Right 2/22/2024    Procedure: REPLACEMENT, WOUND VAC;  Surgeon: Steve Cole MD;  Location: [facility] OR [facility];  Service: General;  Laterality: Right;    TRACHEOSTOMY N/A 2/22/2024    Procedure: CREATION, TRACHEOSTOMY;  Surgeon: Steve Cole MD;  Location:  [facility] OR [facility];  Service: General;  Laterality: N/A;    WOUND DEBRIDEMENT Bilateral 2/2/2024    Procedure: DEBRIDEMENT, WOUND;  Surgeon: Steve Cole MD;  [facility];  Service: General;  Laterality: Bilateral;  Bilateral groin  Possible wound vac placement    WOUND DEBRIDEMENT Right 2/6/2024    Procedure: DEBRIDEMENT, WOUND, replace wound vac, possible closure;  Surgeon: Steve Cole MD;  [facility];  Service: General;  Laterality: Right;  RLE    WOUND DEBRIDEMENT Right 2/9/2024    Procedure: DEBRIDEMENT, WOUND w wound vac change;  Surgeon: Steve Cole MD;  [facility];  Service: General;  Laterality: Right;  RLE    WOUND DEBRIDEMENT Right 2/12/2024    Procedure: R thigh wound debridement;  Surgeon: Mundo Carmona MD;  Location: [facility] OR [address] [place] [address];  Service: General;  Laterality: Right;    WOUND EXPLORATION Right 1/31/2024    Procedure: IRRIGATION & DEBRIDEMENT, WOUND DEBRIDEMENT;  Surgeon: Alvin Junior MD;  Location: [facility];  Service: General;  Laterality: Right;       Review of patient's allergies indicates:  No Known Allergies    Medications:  (Not in a hospital admission)    Antibiotics (From admission, onward)      Start     Stop Route Frequency Ordered    04/11/24 1100  meropenem (MERREM) 500 mg in sodium chloride 0.9 % 100 mL IVPB (MB+)         -- IV Every 12 hours 04/11/24 0952          Antifungals (From admission, onward)      None          Antivirals (From admission, onward)      None             Immunization History   Administered Date(s) Administered    COVID-19, MRNA, LN-S, PF (Pfizer) (Purple Cap) 05/24/2021, 06/14/2021    PPD Test 08/16/2022    Td (ADULT) 11/16/2005       Family History    None       Social History     Socioeconomic History    Marital status: Single   Tobacco Use    Smoking status: Unknown     Social Determinants of Health     Financial Resource Strain: Patient Unable To Answer (3/14/2024)    Overall Financial Resource Strain  (CARDIA)     Difficulty of Paying Living Expenses: Patient unable to answer   Recent Concern: Financial Resource Strain - High Risk (3/9/2024)    Overall Financial Resource Strain (CARDIA)     Difficulty of Paying Living Expenses: Very hard   Food Insecurity: Patient Unable To Answer (3/14/2024)    Hunger Vital Sign     Worried About Running Out of Food in the Last Year: Patient unable to answer     Ran Out of Food in the Last Year: Patient unable to answer   Transportation Needs: Patient Unable To Answer (3/14/2024)    PRAPARE - Transportation     Lack of Transportation (Medical): Patient unable to answer     Lack of Transportation (Non-Medical): Patient unable to answer   Physical Activity: Inactive (3/13/2024)    Exercise Vital Sign     Days of Exercise per Week: 0 days     Minutes of Exercise per Session: 0 min   Stress: Patient Unable To Answer (3/14/2024)    [employer] - Occupational Stress Questionnaire     Feeling of Stress : Patient unable to answer   Social Connections: Socially Isolated (3/13/2024)    Social Connection and Isolation Panel [NHANES]     Frequency of Communication with Friends and Family: More than three times a week     Frequency of Social Gatherings with Friends and Family: More than three times a week     Attends [employer] Services: Never     Active Member of Clubs or Organizations: No     Attends Club or Organization Meetings: Never     Marital Status: Never    Housing Stability: Patient Unable To Answer (3/14/2024)    Housing Stability Vital Sign     Unable to Pay for Housing in the Last Year: Patient unable to answer     Number of Places Lived in the Last Year: 1     Unstable Housing in the Last Year: Patient unable to answer   Recent Concern: Housing Stability - High Risk (3/9/2024)    Housing Stability Vital Sign     Unable to Pay for Housing in the Last Year: Yes     Unstable Housing in the Last Year: No     Review of Systems   Unable to perform  ROS: Patient nonverbal     Objective:     Vital Signs (Most Recent):  Temp: 98.6 °F (37 °C) (04/11/24 0558)  Pulse: 99 (04/11/24 1300)  Resp: 20 (04/11/24 1345)  BP: 111/77 (04/11/24 1300)  SpO2: 98 % (04/11/24 1300) Vital Signs (24h Range):  Temp:  [96.9 °F (36.1 °C)-100 °F (37.8 °C)] 98.6 °F (37 °C)  Pulse:  [] 99  Resp:  [11-40] 20  SpO2:  [95 %-100 %] 98 %  BP: (104-132)/(63-91) 111/77     Weight: 122.5 kg (270 lb 1 oz)  Body mass index is 42.3 kg/m².    Estimated Creatinine Clearance: 15.2 mL/min (A) (based on SCr of 5.8 mg/dL (H)).     Physical Exam  Vitals and nursing note reviewed.   Constitutional:       Appearance: She is well-developed.   HENT:      Head: Normocephalic and atraumatic.   Eyes:      Pupils: Pupils are equal, round, and reactive to light.   Neck:      Comments: Trach site   Cardiovascular:      Rate and Rhythm: Normal rate and regular rhythm.      Heart sounds: Normal heart sounds.   Pulmonary:      Effort: Pulmonary effort is normal. No respiratory distress.      Breath sounds: Normal breath sounds. No wheezing, rhonchi or rales.   Abdominal:      General: Bowel sounds are normal. There is no distension.      Palpations: Abdomen is soft. There is no mass.      Tenderness: There is no abdominal tenderness.   Musculoskeletal:      Cervical back: Normal range of motion and neck supple.   Skin:     General: Skin is warm and dry.      Coloration: Skin is not pale.      Findings: No erythema.      Comments: Right groin wound c/d/I. No purulent drainage    RIJ line for HD       Neurological:      Mental Status: She is alert.          Significant Labs: All pertinent labs within the past 24 hours have been reviewed.    Significant Imaging: I have reviewed all pertinent imaging results/findings within the past 24 hours.

## 2024-04-11 NOTE — SUBJECTIVE & OBJECTIVE
No past medical history on file.  Past Surgical History:   Procedure Laterality Date    CLOSURE OF WOUND Right 2/22/2024    Procedure: CLOSURE, WOUND;  Surgeon: Steve Cole MD;  [facility];  Service: General;  Laterality: Right;    ESOPHAGOGASTRODUODENOSCOPY W/ PEG N/A 2/22/2024    Procedure: EGD, WITH PEG TUBE INSERTION;  Surgeon: Steve Cole MD;  Location: [facility] OR 2ND FLR;  Service: General;  Laterality: N/A;    INCISION AND DRAINAGE OF PERIRECTAL REGION N/A 1/29/2024    Procedure: INCISION AND DRAINAGE, PERIRECTAL REGION;  Surgeon: Axel Ramsay MD;  Location: [facility] OR;  Service: General;  Laterality: N/A;    LUMBAR PUNCTURE N/A 2/16/2024    Procedure: Lumbar Puncture;  Surgeon: Macy Boykin;  Location: [facility];  Service: Anesthesiology;  Laterality: N/A;    PLACEMENT, TRIALYSIS CATH Right 1/31/2024    Procedure: INSERTION, CATHETER, TRIPLE LUMEN, HEMODIALYSIS, TEMPORARY;  Surgeon: Alvin Junior MD;  Location: [facility] OR;  Service: General;  Laterality: Right;    REPLACEMENT OF WOUND VACUUM-ASSISTED CLOSURE DEVICE Right 2/12/2024    Procedure: REPLACEMENT, WOUND VAC;  Surgeon: Mundo Carmona MD;  [facility];  Service: General;  Laterality: Right;    REPLACEMENT OF WOUND VACUUM-ASSISTED CLOSURE DEVICE N/A 2/15/2024    Procedure: REPLACEMENT, WOUND VAC;  Surgeon: Steve Cole MD;  [facility];  Service: General;  Laterality: N/A;    REPLACEMENT OF WOUND VACUUM-ASSISTED CLOSURE DEVICE Right 2/19/2024    Procedure: REPLACEMENT, WOUND VAC;  Surgeon: Steve Cole MD;  Location: [facility] OR [facility] FLR;  Service: General;  Laterality: Right;  RLE/groin    REPLACEMENT OF WOUND VACUUM-ASSISTED CLOSURE DEVICE Right 2/22/2024    Procedure: REPLACEMENT, WOUND VAC;  Surgeon: Steve Cole MD;  Location: [facility] OR 2ND FLR;  Service: General;  Laterality: Right;    TRACHEOSTOMY N/A 2/22/2024    Procedure: CREATION, TRACHEOSTOMY;  Surgeon: Steve Cole MD;   Location: [facility] OR 2ND FLR;  Service: General;  Laterality: N/A;    WOUND DEBRIDEMENT Bilateral 2/2/2024    Procedure: DEBRIDEMENT, WOUND;  Surgeon: Steve Cole MD;  Location: [facility] OR 2ND FLR;  Service: General;  Laterality: Bilateral;  Bilateral groin  Possible wound vac placement    WOUND DEBRIDEMENT Right 2/6/2024    Procedure: DEBRIDEMENT, WOUND, replace wound vac, possible closure;  Surgeon: Steve Cole MD;  [facility];  Service: General;  Laterality: Right;  RLE    WOUND DEBRIDEMENT Right 2/9/2024    Procedure: DEBRIDEMENT, WOUND w wound vac change;  Surgeon: Steve Cole MD;  [facility];  Service: General;  Laterality: Right;  RLE    WOUND DEBRIDEMENT Right 2/12/2024    Procedure: R thigh wound debridement;  Surgeon: Mundo Carmona MD;  [facility];  Service: General;  Laterality: Right;    WOUND EXPLORATION Right 1/31/2024    Procedure: IRRIGATION & DEBRIDEMENT, WOUND DEBRIDEMENT;  Surgeon: Alvin Junior MD;  Location: [facility] OR;  Service: General;  Laterality: Right;     Social History     Tobacco Use    Smoking status: Unknown     Review of patient's allergies indicates:  No Known Allergies    Medications: I have reviewed the current medication administration record.    No current outpatient medications    Review of Systems   Unable to perform ROS: Patient nonverbal     Objective:     Vital Signs (Most Recent):  Temp: 100 °F (37.8 °C) (04/10/24 2146)  Pulse: 106 (04/10/24 2146)  Resp: 20 (04/10/24 2146)  BP: 104/70 (04/10/24 2146)  SpO2: 96 % (04/10/24 2250)    Vital Signs Range (Last 24H):  Temp:  [96.9 °F (36.1 °C)-100 °F (37.8 °C)]   Pulse:  []   Resp:  [20-40]   BP: ()/(54-73)   SpO2:  [96 %-100 %]        Physical Exam  Vitals and nursing note reviewed.   Constitutional:       General: She is awake.      Appearance: She is obese.      Comments: Trach, wound vac, PEG   Neck:      Trachea: Tracheostomy present.   Cardiovascular:       "Rate and Rhythm: Tachycardia present.   Pulmonary:      Effort: Pulmonary effort is normal. No respiratory distress.   Skin:     General: Skin is warm and dry.   Neurological:      Cranial Nerves: Cranial nerve deficit present.      Motor: Weakness present.      Gait: Gait abnormal.              Neurological Exam:   LOC: awake  Language: Global aphasia  Tone: Spasticity  LUE  and RUE      Laboratory:  CMP:   Recent Labs   Lab 04/10/24  0429   CALCIUM 12.1*   ALBUMIN 3.6  3.6   PROT 10.1*   *   K 5.1   CO2 19*   CL 95   *   CREATININE 8.4*   ALKPHOS 149*   ALT 34   AST 19   BILITOT 0.2     CBC:   Recent Labs   Lab 04/10/24  2243   WBC 17.15*   RBC 4.07   HGB 11.4*   HCT 36.1*      MCV 89   MCH 28.0   MCHC 31.6*     Lipid Panel: No results for input(s): "CHOL", "LDLCALC", "HDL", "TRIG" in the last 168 hours.  Coagulation: No results for input(s): "PT", "INR", "APTT" in the last 168 hours.  Hgb A1C: No results for input(s): "HGBA1C" in the last 168 hours.  TSH: No results for input(s): "TSH" in the last 168 hours.    Diagnostic Results:      Brain imaging:  [facility] 4/10/2024 Imaging reviewed by me as acquired. Negative for acute process.    Vessel Imaging:  None    Cardiac Evaluation:   EKG 4/10/2024 Sinus tachycardia w/RBBB    "

## 2024-04-11 NOTE — ED PROVIDER NOTES
History:  Sarah Saravia is a 53 y.o. female who presents to the ED with Altered Mental Status (Pt from [facility] extended care, nonverbal with trach at baseline from previous CVAs. Per extedned care Pt was unable to trach with eyes following dialysis today. Pt able to open eyes at this time, CBG in 300s with EMS, +fever of 100 F. )    Described as 53-year-old female with a history of Ayaz's status post surgical debridement, diabetes, respiratory failure status post trach, CVA (nonverbal, does not follow commands or move extremities other than holding a ball in one hand at baseline, tracks with eyes), PEG dependent, ESRD on HD presenting with an AMS.  She reportedly completed her dialysis today and was sleepy since that time.  On chart review, she did have an episode of large volume emesis around 1700.  Per EMS, she had a slightly elevated temp 100.0°F, glucose 300s.     Review of Systems:  Unable to obtain due to altered mental status and baseline nonverbal    Medications:   Previous Medications    No medications on file       PMH: No past medical history on file.  PSH:   Past Surgical History:   Procedure Laterality Date    CLOSURE OF WOUND Right 2/22/2024    Procedure: CLOSURE, WOUND;  Surgeon: Steve Cole MD;  Location: [address];  Service: General;  Laterality: Right;    ESOPHAGOGASTRODUODENOSCOPY W/ PEG N/A 2/22/2024    Procedure: EGD, WITH PEG TUBE INSERTION;  Surgeon: Steve Cole MD;  Location: [address];  Service: General;  Laterality: N/A;    INCISION AND DRAINAGE OF PERIRECTAL REGION N/A 1/29/2024    Procedure: INCISION AND DRAINAGE, PERIRECTAL REGION;  Surgeon: Axel Ramsay MD;  Location: [facility];  Service: General;  Laterality: N/A;    LUMBAR PUNCTURE N/A 2/16/2024    Procedure: Lumbar Puncture;  Surgeon: Macy Boykin;  Location: [facility];  Service: Anesthesiology;  Laterality: N/A;    PLACEMENT, TRIALYSIS CATH Right 1/31/2024    Procedure: INSERTION, CATHETER, TRIPLE  LUMEN, HEMODIALYSIS, TEMPORARY;  Surgeon: Alvin Junior MD;  Location: [facility] OR;  Service: General;  Laterality: Right;    REPLACEMENT OF WOUND VACUUM-ASSISTED CLOSURE DEVICE Right 2/12/2024    Procedure: REPLACEMENT, WOUND VAC;  Surgeon: Mundo Carmona MD;  Location: [facility] OR 2ND FLR;  Service: General;  Laterality: Right;    REPLACEMENT OF WOUND VACUUM-ASSISTED CLOSURE DEVICE N/A 2/15/2024    Procedure: REPLACEMENT, WOUND VAC;  Surgeon: Steve Cole MD;  Location: [facility] OR 2ND FLR;  Service: General;  Laterality: N/A;    REPLACEMENT OF WOUND VACUUM-ASSISTED CLOSURE DEVICE Right 2/19/2024    Procedure: REPLACEMENT, WOUND VAC;  Surgeon: Steve Cole MD;  Location: [facility] OR 2ND FLR;  Service: General;  Laterality: Right;  RLE/groin    REPLACEMENT OF WOUND VACUUM-ASSISTED CLOSURE DEVICE Right 2/22/2024    Procedure: REPLACEMENT, WOUND VAC;  Surgeon: Steve Cole MD;  Location: [facility] OR 2ND FLR;  Service: General;  Laterality: Right;    TRACHEOSTOMY N/A 2/22/2024    Procedure: CREATION, TRACHEOSTOMY;  Surgeon: Steve Cole MD;  Location: [facility] OR 2ND FLR;  Service: General;  Laterality: N/A;    WOUND DEBRIDEMENT Bilateral 2/2/2024    Procedure: DEBRIDEMENT, WOUND;  Surgeon: Steve Cole MD;  Location: [facility] OR 2ND FLR;  Service: General;  Laterality: Bilateral;  Bilateral groin  Possible wound vac placement    WOUND DEBRIDEMENT Right 2/6/2024    Procedure: DEBRIDEMENT, WOUND, replace wound vac, possible closure;  Surgeon: Steve Cole MD;  Location: [facility] OR 2ND FLR;  Service: General;  Laterality: Right;  RLE    WOUND DEBRIDEMENT Right 2/9/2024    Procedure: DEBRIDEMENT, WOUND w wound vac change;  Surgeon: Steve Cole MD;  Location: [facility] OR 2ND FLR;  Service: General;  Laterality: Right;  RLE    WOUND DEBRIDEMENT Right 2/12/2024    Procedure: R thigh wound debridement;  Surgeon: Mundo Carmona MD;  Location: [facility] OR 2ND FLR;  Service: General;  Laterality: Right;    WOUND EXPLORATION Right  1/31/2024    Procedure: IRRIGATION & DEBRIDEMENT, WOUND DEBRIDEMENT;  Surgeon: Alvin Junior MD;  Location: [facility] OR;  Service: General;  Laterality: Right;     Allergies: She has No Known Allergies.  Social History: Marital Status: single. She  has no history on file for tobacco use.. She  has no history on file for alcohol use..       Exam:  VITAL SIGNS:   Vitals:    04/11/24 0102 04/11/24 0109 04/11/24 0132 04/11/24 0150   BP: 131/82  115/76    Pulse: 99 99 98    Resp: (!) 23 20 19    Temp:       TempSrc:       SpO2: 96% 96% 97%    Weight:    122.5 kg (270 lb 1 oz)     Const: Sleepy, chronically ill appearing  Head: Atraumatic  Eyes: Normal Conjunctiva, PERRL  ENT: Normal External Ears, Nose and Mouth.   Neck: Full range of motion. No meningismus. Trach in place  Resp: Normal respiratory effort, No distress, CTAB  Cardio: Equal and intact distal pulses, tachycardic, regular rhythm  Abd: Soft, non tender, non distended.  Skin: 2 wound R groin with minimal serous drainage, no purulence, no odor, no surrounding erythema, warmth, induration.   Ext: No cyanosis, or edema  Neur: Sleepy, occasionally opens eyes spontaneously, tracks with eyes, 2/5 strength RUE, does not follow commands, 1/5 strength RLE/LLE/LUE. Unable to assess sensation. Nonverbal.       Data:  Results for orders placed or performed during the hospital encounter of 04/10/24   CBC auto differential   Result Value Ref Range    WBC 17.15 (H) 3.90 - 12.70 K/uL    RBC 4.07 4.00 - 5.40 M/uL    Hemoglobin 11.4 (L) 12.0 - 16.0 g/dL    Hematocrit 36.1 (L) 37.0 - 48.5 %    MCV 89 82 - 98 fL    MCH 28.0 27.0 - 31.0 pg    MCHC 31.6 (L) 32.0 - 36.0 g/dL    RDW 15.7 (H) 11.5 - 14.5 %    Platelets 297 150 - 450 K/uL    MPV 10.8 9.2 - 12.9 fL    Immature Granulocytes 0.8 (H) 0.0 - 0.5 %    Gran # (ANC) 12.1 (H) 1.8 - 7.7 K/uL    Immature Grans (Abs) 0.13 (H) 0.00 - 0.04 K/uL    Lymph # 3.1 1.0 - 4.8 K/uL    Mono # 1.4 (H) 0.3 - 1.0 K/uL    Eos # 0.4 0.0 - 0.5 K/uL     Baso # 0.07 0.00 - 0.20 K/uL    nRBC 0 0 /100 WBC    Gran % 70.3 38.0 - 73.0 %    Lymph % 18.3 18.0 - 48.0 %    Mono % 8.0 4.0 - 15.0 %    Eosinophil % 2.2 0.0 - 8.0 %    Basophil % 0.4 0.0 - 1.9 %    Differential Method Automated    Urinalysis, Reflex to Urine Culture Urine, Catheterized    Specimen: Urine   Result Value Ref Range    Specimen UA Urine, Catheterized     Color, UA Lillian Yellow, Straw, Lillian    Appearance, UA Cloudy (A) Clear    pH, UA 5.0 5.0 - 8.0    Specific Gravity, UA 1.025 1.005 - 1.030    Protein, UA 2+ (A) Negative    Glucose, UA 1+ (A) Negative    Ketones, UA Trace (A) Negative    Bilirubin (UA) 1+ (A) Negative    Occult Blood UA 1+ (A) Negative    Nitrite, UA Negative Negative    Leukocytes, UA 2+ (A) Negative   Comprehensive metabolic panel   Result Value Ref Range    Sodium 135 (L) 136 - 145 mmol/L    Potassium 4.7 3.5 - 5.1 mmol/L    Chloride 95 95 - 110 mmol/L    CO2 22 (L) 23 - 29 mmol/L    Glucose 306 (H) 70 - 110 mg/dL    BUN 72 (H) 6 - 20 mg/dL    Creatinine 5.8 (H) 0.5 - 1.4 mg/dL    Calcium 10.7 (H) 8.7 - 10.5 mg/dL    Total Protein 9.7 (H) 6.0 - 8.4 g/dL    Albumin 3.4 (L) 3.5 - 5.2 g/dL    Total Bilirubin 0.3 0.1 - 1.0 mg/dL    Alkaline Phosphatase 137 (H) 55 - 135 U/L    AST 25 10 - 40 U/L    ALT 33 10 - 44 U/L    eGFR 8.2 (A) >60 mL/min/1.73 m^2    Anion Gap 18 (H) 8 - 16 mmol/L   Urinalysis Microscopic   Result Value Ref Range    RBC, UA 61 (H) 0 - 4 /hpf    WBC, UA >100 (H) 0 - 5 /hpf    WBC Clumps, UA Many (A) None-Rare    Bacteria Many (A) None-Occ /hpf    Squam Epithel, UA 66 /hpf    Hyaline Casts, UA 71 (A) 0-1/lpf /lpf    Microscopic Comment SEE COMMENT    ISTAT Lactate   Result Value Ref Range    POC Lactate 3.86 (HH) 0.5 - 2.2 mmol/L    Sample VENOUS     Site Other     Allens Test N/A    ISTAT PROCEDURE   Result Value Ref Range    POC PH 7.441 7.35 - 7.45    POC PCO2 37.7 35 - 45 mmHg    POC PO2 39 (L) 40 - 60 mmHg    POC HCO3 25.7 24 - 28 mmol/L    POC BE 2 -2 to 2  mmol/L    POC SATURATED O2 76 95 - 100 %    POC TCO2 27 24 - 29 mmol/L    Sample VENOUS     Site Other     Allens Test N/A    POCT glucose   Result Value Ref Range    POCT Glucose 318 (H) 70 - 110 mg/dL   ISTAT Lactate   Result Value Ref Range    POC Lactate 3.49 (HH) 0.5 - 2.2 mmol/L    Sample VENOUS     Site Other     Allens Test N/A      Imaging Results              X-Ray Chest AP Portable (Final result)  Result time 04/10/24 23:20:53      Final result by Mc Huffman MD (04/10/24 23:20:53)                   Impression:      Bibasilar subsegmental atelectasis.  No focal consolidation.      Electronically signed by: Mc Huffman MD  Date:    04/10/2024  Time:    23:20               Narrative:    EXAMINATION:  XR CHEST AP PORTABLE    CLINICAL HISTORY:  Sepsis;    TECHNIQUE:  Single frontal view of the chest was performed.    COMPARISON:  03/07/2024.    FINDINGS:  There is stable appearance of the tracheostomy tube with the tip at level of the clavicular heads.  There is unchanged appearance of right-sided chest port tip in the cavoatrial junction.  Monitoring EKG leads are present.    The cardiomediastinal silhouette is stable.  There is no evidence of free air beneath the hemidiaphragms.  There are no pleural effusions.  There is no evidence of a pneumothorax.  There is no evidence of pneumomediastinum.  There is bibasilar subsegmental atelectasis.  There is no focal consolidation.  There are degenerative changes in the osseous structures.                                       CT Head Without Contrast (Final result)  Result time 04/10/24 22:48:04   Procedure changed from CTA STROKE MULTI-PHASE     Final result by Mc Huffman MD (04/10/24 22:48:04)                   Impression:      No evidence of acute intracranial pathology.    Stable remote infarcts as above.    Electronically signed by resident: Tomasa Hearn  Date:    04/10/2024  Time:    22:32    Electronically signed by: Mc Huffman  MD  Date:    04/10/2024  Time:    22:48               Narrative:    EXAMINATION:  CT HEAD WITHOUT CONTRAST    CLINICAL HISTORY:  Neuro deficit, acute, stroke suspected;    TECHNIQUE:  Low dose axial CT images obtained throughout the head without the use of intravenous contrast.  Axial, sagittal, and coronal reconstructions were performed.    There are some motion limitations to the exam.    COMPARISON:  MRI brain 02/10/2024.    CT head 02/07/2024.    FINDINGS:  Intracranial compartment:    Ventricles and sulci are normal in size and age without evidence of hydrocephalus.    Stable remote infarct in the right basal ganglia and left temporal lobe.  Patchy areas of hypoattenuation the periventricular white matter.  This finding is nonspecific but can suggest chronic microvascular ischemic change.  No new parenchymal mass, hemorrhage, edema, or major vascular distribution infarct.    No extra-axial blood or fluid collections.    Skull/extracranial contents (limited evaluation):    No fracture.    Mastoid air cells and paranasal sinuses are essentially clear.                                       CT Abdomen Pelvis  Without Contrast (Final result)  Result time 04/10/24 22:41:08      Final result by Carlton Brown MD (04/10/24 22:41:08)                   Impression:      Previously identified wound at the level of the right mons pubis and groin again noted with improvement as discussed above.    Mild perivesicular haziness, correlation for UTI/cystitis is needed however this appears improved.    Calcifications/mineralization associated with soft tissues adjacent to the right acetabulum appears similar to the prior study, there is thickening and calcification/mineralization along the left piriformis muscle now noted.    Additional findings as above.      Electronically signed by: Carlton Brown  Date:    04/10/2024  Time:    22:41               Narrative:    EXAMINATION:  CT ABDOMEN PELVIS WITHOUT CONTRAST    CLINICAL  HISTORY:  Bowel obstruction suspected;Nausea/vomiting;    TECHNIQUE:  Low dose axial images, sagittal and coronal reformations were obtained from the lung bases to the pubic symphysis.  Intravenous contrast and oral contrast was not utilized, this diminishes the sensitivity of the examination.  Artifact is also present diminishing sensitivity of the examination.    COMPARISON:  CT examination of the abdomen and pelvis March 7, 2024    FINDINGS:  There appears to be the distal aspect of a central venous catheter extending to the level of the cavoatrial junction.  The lung bases demonstrate motion artifact and atelectatic appearing change.    The stomach demonstrates nonspecific appearance of mild distention with ingested material, and air, there is a percutaneous gastrostomy tube noted.  When accounting for limitations of the exam, there is no evidence for acute process of the liver, gallbladder, pancreas, spleen, or adrenal glands.  There is no evidence for ureteral calculus or obstructive uropathy bilaterally.  The abdominal aorta appears normal in caliber, demonstrates atherosclerotic change otherwise not optimally evaluated on this noncontrast examination.    The urinary bladder is decompressed, mild perivesicular haziness noted, although less prominent than on the prior study, correlation for UTI/cystitis is needed.  There is no evidence for dominant adnexal mass or cystic collection.  There are small mildly prominent pelvic lymph nodes again noted.    Previously identified wound involving the level of the right groin and right-sided aspect of the mons pubis noted, this appears improved, however remains present, a tiny air bubble within the soft tissues, there is no evidence for fluid collection to suggest abscess.    There is appearance of calcifications/mineralization of the soft tissues adjacent to the right acetabulum, and right inferior pubic ramus, this appears similar to the prior examination.  There is  however now involvement along the left piriformis muscle with thickening of the left piriformis muscle with calcifications/mineralization.    There are areas of accentuated attenuation within the subcutaneous fat planes of the anterior abdominal wall, nonspecific however these may relate to areas of subcutaneous injection, clinical correlation is needed.    There is no evidence for small bowel obstructive process.  The appendix is identified, it does not appear inflamed.  There is no evidence for inflammatory or obstructive process of the colon.  There is no evidence for free intraperitoneal air.    The osseous structures demonstrate chronic change.                                    12-LEAD EKG INTERPRETATION BY ME:  Rate/Rhythm: Sinus tachycardia with rate of 102 beats per minute  QRS, ST, T-waves: RBBB  Impression: Sinus tachycardia, RBBB     Labs & Imaging studies were reviewed independently by me.     Medical Decision Makin-year-old female presenting to the emergency department with an altered mental status, acting sleepier than usual.  On chart review, it seems as though she did have a episode of emesis today.  Consider possible aspiration or intra-abdominal pathology.  CT head was negative for ICH or other acute abnormalities.  CT abdomen pelvis was negative for SBO or other intra-abdominal infection.  Her daughter did say that she has been coughing over the past couple of days and had another episode of emesis 2 days ago.  She was started on broad-spectrum antibiotics with elevated lactic acid, given a small fluid bolus, not 30 cc/kg secondary to history of ESRD on HD. Labs otherwise show a significant leukocytosis at 17, which has been up trending for the past few days.  Hemoglobin is stable at baseline at 11.4, likely secondary to her ESRD.  Electrolytes are unremarkable, CMP revealing an elevated BUN creatinine, again consistent with her ESRD.  Potassium is normal at 4.7.  She was hyper glycemic,  was started on sliding scale insulin.  Doubt DKA/HHS.  UA contaminated though concerning for severe infection, likely her source.  She has received broad-spectrum antibiotics.  Low suspicion for acute CVA as when awake, she seems to be at her baseline motor function.  Plan on admission to hospital medicine for AMS sepsis/AMS.     Critical Care Time: 40 minutes  Treatments/Evaluations: Close monitoring and treatment of unstable vital signs, cardiorespiratory, and neurologic status, while maintaining tight balance of fluid, respiratory, and cardiac interventions. This time includes discussing the case with the patient and the patients family. This time does not include all procedures stated elsewhere in this record. This time also includes reviewing old records, labs and radiological studies. This time includes examining and re-examining the patient. Additionally, this time also includes arranging care with admitting and consulting physicians.     Clinical Impression:  1. Sepsis, due to unspecified organism, unspecified whether acute organ dysfunction present    2. Tachycardia    3. Altered mental status, unspecified altered mental status type    4. Acute cystitis with hematuria    5. Chronic wound                 Anna Polo MD  04/11/24 0745

## 2024-04-11 NOTE — HPI
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 Reportedly patient was less responsive than her baseline had an episode of emesis, in the ED she had a CT abdomen/pelvis concerning for cystitis as well as a UA (obtained via straight cath) concerning for a UTI. She was initially started on vancomycin and Zosyn later broadened to meropenem. On presentation her labs were notable for leukocytosis of 17, as well as a lactic acidosis of 3.9 that has improved to 2.4 with the administration of 1 L of IV fluids, we are being cautious with fluid repletion given her end-stage renal disease and oliguric status. ID is now consulted for abrupt increase in WBC to 22 w/ associated fever. Daughter at bedside notes that patient had a few episodes of vomiting 2 days ago after initiation of tube feeds, and feels her breathing pattern is different today. Patient is unable to answer questions. CXR  w/ increased vascular congestion. She has been restarted on broad spectrum abx.

## 2024-04-11 NOTE — CARE UPDATE
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

## 2024-04-11 NOTE — SUBJECTIVE & OBJECTIVE
No past medical history on file.    Past Surgical History:   Procedure Laterality Date    CLOSURE OF WOUND Right 2/22/2024    Procedure: CLOSURE, WOUND;  Surgeon: Steve Cole MD;  [facility];  Service: General;  Laterality: Right;    ESOPHAGOGASTRODUODENOSCOPY W/ PEG N/A 2/22/2024    Procedure: EGD, WITH PEG TUBE INSERTION;  Surgeon: Steve Cole MD;  Location: [facility] OR 2ND FLR;  Service: General;  Laterality: N/A;    INCISION AND DRAINAGE OF PERIRECTAL REGION N/A 1/29/2024    Procedure: INCISION AND DRAINAGE, PERIRECTAL REGION;  Surgeon: Axel Ramsay MD;  Location: [facility] OR;  Service: General;  Laterality: N/A;    LUMBAR PUNCTURE N/A 2/16/2024    Procedure: Lumbar Puncture;  Surgeon: Macy Boykin;  Location: [facility];  Service: Anesthesiology;  Laterality: N/A;    PLACEMENT, TRIALYSIS CATH Right 1/31/2024    Procedure: INSERTION, CATHETER, TRIPLE LUMEN, HEMODIALYSIS, TEMPORARY;  Surgeon: Alvin Junior MD;  Location: [facility] OR;  Service: General;  Laterality: Right;    REPLACEMENT OF WOUND VACUUM-ASSISTED CLOSURE DEVICE Right 2/12/2024    Procedure: REPLACEMENT, WOUND VAC;  Surgeon: Mundo Carmona MD;  [facility];  Service: General;  Laterality: Right;    REPLACEMENT OF WOUND VACUUM-ASSISTED CLOSURE DEVICE N/A 2/15/2024    Procedure: REPLACEMENT, WOUND VAC;  Surgeon: Steve Cole MD;  [facility];  Service: General;  Laterality: N/A;    REPLACEMENT OF WOUND VACUUM-ASSISTED CLOSURE DEVICE Right 2/19/2024    Procedure: REPLACEMENT, WOUND VAC;  Surgeon: Steve Cole MD;  Location: [facility] OR [facility] FLR;  Service: General;  Laterality: Right;  RLE/groin    REPLACEMENT OF WOUND VACUUM-ASSISTED CLOSURE DEVICE Right 2/22/2024    Procedure: REPLACEMENT, WOUND VAC;  Surgeon: Steve Cole MD;  Location: [facility] OR 2ND FLR;  Service: General;  Laterality: Right;    TRACHEOSTOMY N/A 2/22/2024    Procedure: CREATION, TRACHEOSTOMY;  Surgeon: Steve Cole MD;  Location:  [facility] OR 2ND FLR;  Service: General;  Laterality: N/A;    WOUND DEBRIDEMENT Bilateral 2/2/2024    Procedure: DEBRIDEMENT, WOUND;  Surgeon: Steve Cole MD;  [facility];  Service: General;  Laterality: Bilateral;  Bilateral groin  Possible wound vac placement    WOUND DEBRIDEMENT Right 2/6/2024    Procedure: DEBRIDEMENT, WOUND, replace wound vac, possible closure;  Surgeon: Steve Cole MD;  [facility];  Service: General;  Laterality: Right;  RLE    WOUND DEBRIDEMENT Right 2/9/2024    Procedure: DEBRIDEMENT, WOUND w wound vac change;  Surgeon: Steve Cole MD;  [facility];  Service: General;  Laterality: Right;  RLE    WOUND DEBRIDEMENT Right 2/12/2024    Procedure: R thigh wound debridement;  Surgeon: Mundo Carmona MD;  Location: [facility] OR [address] [place] [address];  Service: General;  Laterality: Right;    WOUND EXPLORATION Right 1/31/2024    Procedure: IRRIGATION & DEBRIDEMENT, WOUND DEBRIDEMENT;  Surgeon: Alvin Junior MD;  Location: [facility];  Service: General;  Laterality: Right;       Review of patient's allergies indicates:  No Known Allergies    Current Facility-Administered Medications on File Prior to Encounter   Medication    [MAR Hold - Suspended Admission] 0.9%  NaCl infusion    [MAR Hold - Suspended Admission] 0.9%  NaCl infusion    [MAR Hold - Suspended Admission] acetaminophen 160 mg/5 mL (5 mL) liquid (ADULTS) 649.6 mg    [MAR Hold - Suspended Admission] albuterol-ipratropium 2.5 mg-0.5 mg/3 mL nebulizer solution 3 mL    [COMPLETED] alteplase injection 4 mg    [MAR Hold - Suspended Admission] amLODIPine tablet 10 mg    [MAR Hold - Suspended Admission] ascorbic acid (vitamin C) tablet 500 mg    [MAR Hold - Suspended Admission] carvediloL tablet 25 mg    [MAR Hold - Suspended Admission] chlorhexidine 0.12 % solution 15 mL    [MAR Hold - Suspended Admission] dextrose 10% bolus 125 mL 125 mL    [MAR Hold - Suspended Admission] dextrose 10% bolus 250 mL 250 mL     [MAR Hold - Suspended Admission] glucagon (human recombinant) injection 1 mg    [MAR Hold - Suspended Admission] heparin (porcine) injection 1,000 Units    [MAR Hold - Suspended Admission] heparin (porcine) injection 7,500 Units    [MAR Hold - Suspended Admission] hydrALAZINE tablet 25 mg    [MAR Hold - Suspended Admission] insulin aspart U-100 pen 0-10 Units    [MAR Hold - Suspended Admission] insulin detemir U-100 (Levemir) pen 25 Units    [MAR Hold - Suspended Admission] labetaloL injection 10 mg    [MAR Hold - Suspended Admission] loperamide capsule 2 mg    [MAR Hold - Suspended Admission] naloxone 0.4 mg/mL injection 0.02 mg    [MAR Hold - Suspended Admission] ondansetron injection 4 mg    [MAR Hold - Suspended Admission] pantoprazole suspension 40 mg    [MAR Hold - Suspended Admission] prochlorperazine injection Soln 5 mg    [MAR Hold - Suspended Admission] psyllium husk (aspartame) 3.4 gram packet 1 packet    [MAR Hold - Suspended Admission] sevelamer carbonate pwpk 2.4 g    [MAR Hold - Suspended Admission] sodium chloride 0.9% bolus 250 mL 250 mL    [MAR Hold - Suspended Admission] sodium chloride 0.9% flush 10 mL    [MAR Hold - Suspended Admission] zinc sulfate capsule 220 mg    [DISCONTINUED] insulin detemir U-100 (Levemir) pen 20 Units    [DISCONTINUED] sevelamer carbonate pwpk 1.6 g     No current outpatient medications on file prior to encounter.     Family History    None       Tobacco Use    Smoking status: Unknown    Smokeless tobacco: Not on file   Substance and Sexual Activity    Alcohol use: Not on file    Drug use: Not on file    Sexual activity: Not on file     Review of Systems   Reason unable to perform ROS: nonverbal.     Objective:     Vital Signs (Most Recent):  Temp: 98.8 °F (37.1 °C) (04/11/24 0225)  Pulse: 99 (04/11/24 0225)  Resp: 20 (04/11/24 0225)  BP: 113/63 (04/11/24 0225)  SpO2: 95 % (04/11/24 0225) Vital Signs (24h Range):  Temp:  [96.9 °F (36.1 °C)-100 °F (37.8 °C)] 98.8 °F (37.1  °C)  Pulse:  [] 99  Resp:  [19-40] 20  SpO2:  [95 %-100 %] 95 %  BP: ()/(54-91) 113/63     Weight: 122.5 kg (270 lb 1 oz)  Body mass index is 42.3 kg/m².     Physical Exam  Constitutional:       General: She is not in acute distress.     Appearance: She is ill-appearing.      Comments: Trach in place, no purulence    HENT:      Head: Normocephalic and atraumatic.   Eyes:      General: No scleral icterus.     Extraocular Movements: Extraocular movements intact.   Cardiovascular:      Rate and Rhythm: Normal rate and regular rhythm.   Pulmonary:      Effort: Pulmonary effort is normal. No respiratory distress.   Abdominal:      General: Abdomen is flat. There is no distension.      Palpations: Abdomen is soft.      Tenderness: There is no abdominal tenderness.      Comments: Peg tube in place, no purulence noted   Genitourinary:     Comments: Has large bandage over groin area. Pictures in chart  Musculoskeletal:      Right lower leg: No edema.      Left lower leg: No edema.      Comments: Moves BUE spontaneously   Skin:     General: Skin is warm and dry.      Findings: Wound present.   Neurological:      Mental Status: She is alert.      Comments: Nonverbal   Psychiatric:      Comments: Nonverbal                Significant Labs: All pertinent labs within the past 24 hours have been reviewed.    Significant Imaging: I have reviewed all pertinent imaging results/findings within the past 24 hours.

## 2024-04-11 NOTE — HPI
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 History was given from patient's daughter. She was undergoing dialysis today and noticed she was lethargic, less alert than usual self. Pt completed dialysis and still not acting herself. EMS was called, fever of a 100.  On chart review, she did have an episode of large volume emesis around 1700.  Per EMS, she had a slightly elevated temp 100.0°F, glucose 300s.     In the ED: UA 2+ leuks, >100 WBC, many bacteria, WBC 17, CT abd/pelvis concerning for cystitis. Given vanc/zosyn

## 2024-04-11 NOTE — ASSESSMENT & PLAN NOTE
Pt presenting with AMS and worsening lethargy. Pt is non-verbal at baseline, does not follow commands, but was less responsive than normal. Pt found to have a fever. Urinary source suspected based off of urine and CT abd/pelvis. Pt has many prior resistant bacterial infections including urinary sources. Has prior with sensitivity to zosyn and has also improved off ED dose of vanc/zosyn. Lactic elevated a 3.8->3.49 prior to completion of fluid bolus 500ml    Plan  Vanc/zosyn cont  ID consult  Concern for giving too much fluid and causing volume overload, 500ml,   Repeat lactic  Bcx2  UC  Daily cbc  Contact precautions

## 2024-04-11 NOTE — SUBJECTIVE & OBJECTIVE
No past medical history on file.    Past Surgical History:   Procedure Laterality Date    CLOSURE OF WOUND Right 2/22/2024    Procedure: CLOSURE, WOUND;  Surgeon: Steve Cole MD;  [facility];  Service: General;  Laterality: Right;    ESOPHAGOGASTRODUODENOSCOPY W/ PEG N/A 2/22/2024    Procedure: EGD, WITH PEG TUBE INSERTION;  Surgeon: Steve Cole MD;  Location: [facility] OR 2ND FLR;  Service: General;  Laterality: N/A;    INCISION AND DRAINAGE OF PERIRECTAL REGION N/A 1/29/2024    Procedure: INCISION AND DRAINAGE, PERIRECTAL REGION;  Surgeon: Axel Ramsay MD;  Location: [facility] OR;  Service: General;  Laterality: N/A;    LUMBAR PUNCTURE N/A 2/16/2024    Procedure: Lumbar Puncture;  Surgeon: Macy Boykin;  Location: [facility];  Service: Anesthesiology;  Laterality: N/A;    PLACEMENT, TRIALYSIS CATH Right 1/31/2024    Procedure: INSERTION, CATHETER, TRIPLE LUMEN, HEMODIALYSIS, TEMPORARY;  Surgeon: Alvin Junior MD;  Location: [facility] OR;  Service: General;  Laterality: Right;    REPLACEMENT OF WOUND VACUUM-ASSISTED CLOSURE DEVICE Right 2/12/2024    Procedure: REPLACEMENT, WOUND VAC;  Surgeon: Mundo Carmona MD;  [facility];  Service: General;  Laterality: Right;    REPLACEMENT OF WOUND VACUUM-ASSISTED CLOSURE DEVICE N/A 2/15/2024    Procedure: REPLACEMENT, WOUND VAC;  Surgeon: Steve Cole MD;  [facility];  Service: General;  Laterality: N/A;    REPLACEMENT OF WOUND VACUUM-ASSISTED CLOSURE DEVICE Right 2/19/2024    Procedure: REPLACEMENT, WOUND VAC;  Surgeon: Steve Cole MD;  Location: [facility] OR [facility] FLR;  Service: General;  Laterality: Right;  RLE/groin    REPLACEMENT OF WOUND VACUUM-ASSISTED CLOSURE DEVICE Right 2/22/2024    Procedure: REPLACEMENT, WOUND VAC;  Surgeon: Steve Cole MD;  Location: [facility] OR 2ND FLR;  Service: General;  Laterality: Right;    TRACHEOSTOMY N/A 2/22/2024    Procedure: CREATION, TRACHEOSTOMY;  Surgeon: Steve Cole MD;  Location:  [facility] OR 2ND FLR;  Service: General;  Laterality: N/A;    WOUND DEBRIDEMENT Bilateral 2/2/2024    Procedure: DEBRIDEMENT, WOUND;  Surgeon: Steve Cole MD;  [facility];  Service: General;  Laterality: Bilateral;  Bilateral groin  Possible wound vac placement    WOUND DEBRIDEMENT Right 2/6/2024    Procedure: DEBRIDEMENT, WOUND, replace wound vac, possible closure;  Surgeon: Steve Cole MD;  [facility];  Service: General;  Laterality: Right;  RLE    WOUND DEBRIDEMENT Right 2/9/2024    Procedure: DEBRIDEMENT, WOUND w wound vac change;  Surgeon: Steve Cole MD;  [facility];  Service: General;  Laterality: Right;  RLE    WOUND DEBRIDEMENT Right 2/12/2024    Procedure: R thigh wound debridement;  Surgeon: Mundo Carmona MD;  Location: [facility] OR [address] [place] [address];  Service: General;  Laterality: Right;    WOUND EXPLORATION Right 1/31/2024    Procedure: IRRIGATION & DEBRIDEMENT, WOUND DEBRIDEMENT;  Surgeon: Alvin Junior MD;  Location: [facility];  Service: General;  Laterality: Right;       Review of patient's allergies indicates:  No Known Allergies  Current Facility-Administered Medications   Medication Frequency    [START ON 4/12/2024] 0.9%  NaCl infusion Once    ascorbic acid (vitamin C) tablet 500 mg BID    dextrose 10% bolus 125 mL 125 mL PRN    dextrose 10% bolus 250 mL 250 mL PRN    glucagon (human recombinant) injection 1 mg PRN    glucose chewable tablet 16 g PRN    glucose chewable tablet 24 g PRN    heparin (porcine) injection 7,500 Units Q8H    insulin aspart U-100 injection 0-10 Units PRN    insulin detemir U-100 (Levemir) pen 25 Units BID    lactated ringers infusion Continuous    meropenem (MERREM) 500 mg in sodium chloride 0.9 % 100 mL IVPB (MB+) Q12H    naloxone 0.4 mg/mL injection 0.02 mg PRN    pantoprazole suspension 40 mg Daily    psyllium husk (aspartame) 3.4 gram packet 1 packet Daily    sevelamer carbonate 2.4 gram powder 2.4 g TID    sodium chloride  0.9% flush 10 mL Q12H PRN    vancomycin - pharmacy to dose pharmacy to manage frequency    zinc sulfate capsule 220 mg Daily     Current Outpatient Medications   Medication    amLODIPine (NORVASC) 10 MG tablet    ascorbic acid, vitamin C, (VITAMIN C) 500 MG tablet    carvediloL (COREG) 25 MG tablet    heparin sodium,porcine (HEPARIN, PORCINE,) 5,000 unit/mL injection    insulin aspart U-100 (NOVOLOG) 100 unit/mL injection    insulin detemir U-100 (LEVEMIR) 100 unit/mL injection    pantoprazole sodium (PANTOPRAZOLE ORAL)    psyllium (KONSYL) Powd    sevelamer carbonate (RENVELA) 2.4 gram PwPk    zinc sulfate (ZINCATE) 50 mg zinc (220 mg) capsule     Facility-Administered Medications Ordered in Other Encounters   Medication Frequency    [MAR Hold - Suspended Admission] 0.9%  NaCl infusion PRN    [MAR Hold - Suspended Admission] 0.9%  NaCl infusion Once    [MAR Hold - Suspended Admission] acetaminophen 160 mg/5 mL (5 mL) liquid (ADULTS) 649.6 mg Q4H PRN    [MAR Hold - Suspended Admission] albuterol-ipratropium 2.5 mg-0.5 mg/3 mL nebulizer solution 3 mL Q4H PRN    [MAR Hold - Suspended Admission] amLODIPine tablet 10 mg Daily    [MAR Hold - Suspended Admission] ascorbic acid (vitamin C) tablet 500 mg BID    [MAR Hold - Suspended Admission] carvediloL tablet 25 mg BID    [MAR Hold - Suspended Admission] chlorhexidine 0.12 % solution 15 mL BID    [MAR Hold - Suspended Admission] dextrose 10% bolus 125 mL 125 mL PRN    [MAR Hold - Suspended Admission] dextrose 10% bolus 250 mL 250 mL PRN    [MAR Hold - Suspended Admission] glucagon (human recombinant) injection 1 mg PRN    [MAR Hold - Suspended Admission] heparin (porcine) injection 1,000 Units PRN    [MAR Hold - Suspended Admission] heparin (porcine) injection 7,500 Units Q8H    [MAR Hold - Suspended Admission] hydrALAZINE tablet 25 mg Q8H PRN    [MAR Hold - Suspended Admission] insulin aspart U-100 pen 0-10 Units Q6H PRN    [MAR Hold - Suspended Admission] insulin  detemir U-100 (Levemir) pen 25 Units BID    [MAR Hold - Suspended Admission] labetaloL injection 10 mg Q6H PRN    [MAR Hold - Suspended Admission] loperamide capsule 2 mg QID PRN    [MAR Hold - Suspended Admission] naloxone 0.4 mg/mL injection 0.02 mg PRN    [MAR Hold - Suspended Admission] ondansetron injection 4 mg Q6H PRN    [MAR Hold - Suspended Admission] pantoprazole suspension 40 mg Daily    [MAR Hold - Suspended Admission] prochlorperazine injection Soln 5 mg Q6H PRN    [MAR Hold - Suspended Admission] psyllium husk (aspartame) 3.4 gram packet 1 packet Daily    [MAR Hold - Suspended Admission] sevelamer carbonate pwpk 2.4 g TID    [MAR Hold - Suspended Admission] sodium chloride 0.9% bolus 250 mL 250 mL PRN    [MAR Hold - Suspended Admission] sodium chloride 0.9% flush 10 mL Q12H PRN    [MAR Hold - Suspended Admission] zinc sulfate capsule 220 mg Daily     Family History    None       Tobacco Use    Smoking status: Unknown    Smokeless tobacco: Not on file   Substance and Sexual Activity    Alcohol use: Not on file    Drug use: Not on file    Sexual activity: Not on file     Review of Systems   Unable to perform ROS: Patient nonverbal     Objective:     Vital Signs (Most Recent):  Temp: 98.6 °F (37 °C) (04/11/24 0558)  Pulse: 97 (04/11/24 1020)  Resp: 20 (04/11/24 1020)  BP: 112/77 (04/11/24 0900)  SpO2: 98 % (04/11/24 1020) Vital Signs (24h Range):  Temp:  [96.9 °F (36.1 °C)-100 °F (37.8 °C)] 98.6 °F (37 °C)  Pulse:  [] 97  Resp:  [11-40] 20  SpO2:  [95 %-100 %] 98 %  BP: ()/(54-91) 112/77     Weight: 122.5 kg (270 lb 1 oz) (04/11/24 0225)  Body mass index is 42.3 kg/m².  Body surface area is 2.41 meters squared.    I/O last 3 completed shifts:  In: 1760 [Other:500; NG/GT:660; IV Piggyback:600]  Out: 2000 [Other:2000]     Physical Exam  Constitutional:       General: She is not in acute distress.     Appearance: She is ill-appearing.      Comments: Trach in place, no purulence    HENT:       Head: Normocephalic and atraumatic.   Eyes:      General: No scleral icterus.     Extraocular Movements: Extraocular movements intact.   Cardiovascular:      Rate and Rhythm: Normal rate and regular rhythm.   Pulmonary:      Effort: Pulmonary effort is normal. No respiratory distress.   Abdominal:      General: Abdomen is flat. There is no distension.      Palpations: Abdomen is soft.      Tenderness: There is no abdominal tenderness.      Comments: Peg tube in place, no purulence noted   Musculoskeletal:      Right lower leg: No edema.      Left lower leg: No edema.   Skin:     General: Skin is warm and dry.      Findings: Wound present.   Neurological:      Mental Status: She is alert.      Comments: Nonverbal   Psychiatric:      Comments: Nonverbal          Significant Labs:  CBC:   Recent Labs   Lab 04/10/24  2243   WBC 17.15*   RBC 4.07   HGB 11.4*   HCT 36.1*      MCV 89   MCH 28.0   MCHC 31.6*     CMP:   Recent Labs   Lab 04/11/24  0101   *   CALCIUM 10.7*   ALBUMIN 3.4*   PROT 9.7*   *   K 4.7   CO2 22*   CL 95   BUN 72*   CREATININE 5.8*   ALKPHOS 137*   ALT 33   AST 25   BILITOT 0.3     All labs within the past 24 hours have been reviewed.

## 2024-04-11 NOTE — CONSULTS
[facility] - Emergency Dept  Vascular Neurology  Com[facility] Stroke Center  Consult Note    Consults-ED Stroke Code Activation  Assessment/Plan:     Acute encephalopathy  Sarah Saravia is a 53 y.o. female with a significant medical history of obesity, necrotizing fascitis s/p surgical debridement (1/30/2024) s/p wound vac who presents to the hospital as a transfer from [facility] for evaluation of AMS. At baseline the patient is nonverbal and does not follow commands but tracks movement with her eyes.  Tonight she was reported to be lethargic, not tracking with her eyes. Her family report she has been having an intermittent cough that has been different from her usual cough and fever a few days ago.    -NIHSS 22. Review of the patient's medical record shows she was seen by this team prior to discharge and had an NIHSS 29 at that time.  -CTH is negative for acute findings. She is not a candidate for IV thrombolytic due to recent stroke, likely stroke mimic.  -She was not recommended to have antithrombotics due to concern for mycotic aneurysm   -The patient's temp in the ED is 99.1 (axillary). Labs are pending.  -DDx includes but is not limited to: infection, seizure (the patient had some intermittent twitching of the left side of her face and the fingers on her left hand during this assessment)        ESRD (end stage renal disease) on dialysis  -Patient currently receiving dialysis MWF per family.  -She was last dialyzed today.  -Avoid nephrotoxins if possible.    Spastic hemiplegia of right dominant side as late effect of cerebrovascular disease  -Spastic hemiplegia of the BUE, L>R.  -Admitted to [facility] prior to presenting to the ED.    Type 2 diabetes mellitus with hyperglycemia, with long-term current use of insulin  -Stroke risk factor. Last A1c 10.4 (1/30/2024).  -Glucose on labs 244.  -If hospitalized recommend tight glucose control, glucose 140-180.          STROKE DOCUMENTATION     Acute Stroke Times    Last Known Normal Date: 04/10/24  Unknown Normal Date: Unknown Date  Last Known Normal Time: 1200  Unknown Normal Time: Unknown Time  Unknown Symptom Onset Date: Unknown Date  Unknown Symptom Onset Time: Unknown Time  Stroke Team Called Date: 04/10/24  Stroke Team Called Time: 2156  Stroke Team Arrival Date: 04/10/24  Stroke Team Arrival Time: 2201  CT Interpretation Time: 2215  Thrombolytic Therapy Recommended: No  CTA Interpretation Time:  (deferred due to patient's renal function, low suspicion for LVO at this time)  Thrombectomy Recommended: No    NIH Scale:  Interval: baseline  1a. Level of Consciousness: 1-->Not alert, but arousable by minor stimulation to obey, answer, or respond  1b. LOC Questions: 2-->Answers neither question correctly  1c. LOC Commands: 2-->Performs neither task correctly  2. Best Gaze: 1-->Partial gaze palsy, gaze is abnormal in one or both eyes, but forced deviation or total gaze paresis is not present  3. Visual: 0-->No visual loss  4. Facial Palsy: 0-->Normal symmetrical movements  5a. Motor Arm, Left: 2-->Some effort against gravity, limb cannot get to or maintain (if cued) 90 (or 45) degrees, drifts down to bed, but has some effort against gravity  5b. Motor Arm, Right: 2-->Some effort against gravity, limb cannot get to or maintain (if cued) 90 (or 45) degrees, drifts down to bed, but has some effort against gravity  6a. Motor Leg, Left: 3-->No effort against gravity, leg falls to bed immediately  6b. Motor Leg, Right: 3-->No effort against gravity, leg falls to bed immediately  7. Limb Ataxia: 0-->Absent  8. Sensory: 0-->Normal, no sensory loss  9. Best Language: 3-->Mute, global aphasia, no usable speech or auditory comprehension  10. Dysarthria: 2-->Severe dysarthria, patients speech is so slurred as to be unintelligible in the absence of or out of proportion to any dysphasia, or is mute/anarthric  11. Extinction and Inattention (formerly Neglect): 1-->Visual, tactile,  auditory, spatial, or personal inattention or extinction to bilateral simultaneous stimulation in one of the sensory modalities  Total (NIH Stroke Scale): 22    Modified [place] Score: 4  Cece Coma Scale:9   ABCD2 Score:    [id number] Score:   HAS -BLED Score:   ICH Score:   Hunt & Miles Classification:       Thrombolysis Candidate? No, Recent moderate to severe stroke (< 3 months), Strong suspicion for stroke mimic or alternative diagnosis     Delays to Thrombolysis?  Not Applicable    Interventional Revascularization Candidate?   Is the patient eligible for mechanical endovascular reperfusion (MAGDALENA)?  No; at this time symptoms not suggestive of large vessel occlusion    Delays to Thrombectomy? Not Applicable    Hemorrhagic change of an Ischemic Stroke: Does this patient have an ischemic stroke with hemorrhagic changes? No     Subjective:     History of Present Illness:  Sarah Saravia is a 53 y.o. female with a significant medical history of obesity, necrotizing fascitis s/p surgical debridement (1/30/2024) s/p wound vac who presents to the hospital as a transfer from [facility] for evaluation of AMS.  HPI information gathered from review of the patient's medical record, EMS personnel, patient's family, LTAC notes due to the patient having aphasia.  At baseline the patient is nonverbal and does not follow commands but tracks movement with her eyes.  Tonight she was reported to be lethargic, not tracking with her eyes.  She was sent to the ED where a stroke code was activated.    On my arrival to the ED the patient's eyes are open.  She has a trach collar, wound VAC, and PEG.  Per her family the patient has been having intermittent cough for the past 2 days that have been different from her baseline cough.  They also state the patient had a fever a few days ago.                No past medical history on file.  Past Surgical History:   Procedure Laterality Date    CLOSURE OF WOUND Right 2/22/2024    Procedure:  CLOSURE, WOUND;  Surgeon: Steve Cole MD;  Location: [facility] OR 2ND FLR;  Service: General;  Laterality: Right;    ESOPHAGOGASTRODUODENOSCOPY W/ PEG N/A 2/22/2024    Procedure: EGD, WITH PEG TUBE INSERTION;  Surgeon: Steve Cole MD;  Location: [facility] OR 2ND FLR;  Service: General;  Laterality: N/A;    INCISION AND DRAINAGE OF PERIRECTAL REGION N/A 1/29/2024    Procedure: INCISION AND DRAINAGE, PERIRECTAL REGION;  Surgeon: Axel aRmsay MD;  Location: [facility] OR;  Service: General;  Laterality: N/A;    LUMBAR PUNCTURE N/A 2/16/2024    Procedure: Lumbar Puncture;  Surgeon: Macy Boykin;  Location: [facility];  Service: Anesthesiology;  Laterality: N/A;    PLACEMENT, TRIALYSIS CATH Right 1/31/2024    Procedure: INSERTION, CATHETER, TRIPLE LUMEN, HEMODIALYSIS, TEMPORARY;  Surgeon: Alvin Junior MD;  Location: [facility] OR;  Service: General;  Laterality: Right;    REPLACEMENT OF WOUND VACUUM-ASSISTED CLOSURE DEVICE Right 2/12/2024    Procedure: REPLACEMENT, WOUND VAC;  Surgeon: Mundo Carmona MD;  Location: [facility] OR 2ND FLR;  Service: General;  Laterality: Right;    REPLACEMENT OF WOUND VACUUM-ASSISTED CLOSURE DEVICE N/A 2/15/2024    Procedure: REPLACEMENT, WOUND VAC;  Surgeon: Steve Cole MD;  Location: [facility] OR 2ND FLR;  Service: General;  Laterality: N/A;    REPLACEMENT OF WOUND VACUUM-ASSISTED CLOSURE DEVICE Right 2/19/2024    Procedure: REPLACEMENT, WOUND VAC;  Surgeon: Steve Cole MD;  Location: [facility] OR 2ND FLR;  Service: General;  Laterality: Right;  RLE/groin    REPLACEMENT OF WOUND VACUUM-ASSISTED CLOSURE DEVICE Right 2/22/2024    Procedure: REPLACEMENT, WOUND VAC;  Surgeon: Steve Cole MD;  Location: [facility] OR 2ND FLR;  Service: General;  Laterality: Right;    TRACHEOSTOMY N/A 2/22/2024    Procedure: CREATION, TRACHEOSTOMY;  Surgeon: Steve Cole MD;  Location: [facility] OR 2ND FLR;  Service: General;  Laterality: N/A;    WOUND DEBRIDEMENT Bilateral 2/2/2024    Procedure: DEBRIDEMENT, WOUND;  Surgeon:  Steve Cole MD;  [facility];  Service: General;  Laterality: Bilateral;  Bilateral groin  Possible wound vac placement    WOUND DEBRIDEMENT Right 2/6/2024    Procedure: DEBRIDEMENT, WOUND, replace wound vac, possible closure;  Surgeon: Steve Cole MD;  Location: [facility] OR 2ND FLR;  Service: General;  Laterality: Right;  RLE    WOUND DEBRIDEMENT Right 2/9/2024    Procedure: DEBRIDEMENT, WOUND w wound vac change;  Surgeon: Steve Cole MD;  Location: [facility] OR [facility];  Service: General;  Laterality: Right;  RLE    WOUND DEBRIDEMENT Right 2/12/2024    Procedure: R thigh wound debridement;  Surgeon: Mundo Carmona MD;  [facility];  Service: General;  Laterality: Right;    WOUND EXPLORATION Right 1/31/2024    Procedure: IRRIGATION & DEBRIDEMENT, WOUND DEBRIDEMENT;  Surgeon: Alvin Junior MD;  Location: [facility] OR;  Service: General;  Laterality: Right;     Social History     Tobacco Use    Smoking status: Unknown     Review of patient's allergies indicates:  No Known Allergies    Medications: I have reviewed the current medication administration record.    No current outpatient medications    Review of Systems   Unable to perform ROS: Patient nonverbal     Objective:     Vital Signs (Most Recent):  Temp: 100 °F (37.8 °C) (04/10/24 2146)  Pulse: 106 (04/10/24 2146)  Resp: 20 (04/10/24 2146)  BP: 104/70 (04/10/24 2146)  SpO2: 96 % (04/10/24 2250)    Vital Signs Range (Last 24H):  Temp:  [96.9 °F (36.1 °C)-100 °F (37.8 °C)]   Pulse:  []   Resp:  [20-40]   BP: ()/(54-73)   SpO2:  [96 %-100 %]        Physical Exam  Vitals and nursing note reviewed.   Constitutional:       General: She is awake.      Appearance: She is obese.      Comments: Trach, wound vac, PEG   Neck:      Trachea: Tracheostomy present.   Cardiovascular:      Rate and Rhythm: Tachycardia present.   Pulmonary:      Effort: Pulmonary effort is normal. No respiratory distress.   Skin:     General: Skin is warm  "and dry.   Neurological:      Cranial Nerves: Cranial nerve deficit present.      Motor: Weakness present.      Gait: Gait abnormal.              Neurological Exam:   LOC: awake  Language: Global aphasia  Tone: Spasticity  LUE  and RUE      Laboratory:  CMP:   Recent Labs   Lab 04/10/24  0429   CALCIUM 12.1*   ALBUMIN 3.6  3.6   PROT 10.1*   *   K 5.1   CO2 19*   CL 95   *   CREATININE 8.4*   ALKPHOS 149*   ALT 34   AST 19   BILITOT 0.2     CBC:   Recent Labs   Lab 04/10/24  2243   WBC 17.15*   RBC 4.07   HGB 11.4*   HCT 36.1*      MCV 89   MCH 28.0   MCHC 31.6*     Lipid Panel: No results for input(s): "CHOL", "LDLCALC", "HDL", "TRIG" in the last 168 hours.  Coagulation: No results for input(s): "PT", "INR", "APTT" in the last 168 hours.  Hgb A1C: No results for input(s): "HGBA1C" in the last 168 hours.  TSH: No results for input(s): "TSH" in the last 168 hours.    Diagnostic Results:      Brain imaging:  CT 4/10/2024 Imaging reviewed by me as acquired. Negative for acute process.    Vessel Imaging:  None    Cardiac Evaluation:   EKG 4/10/2024 Sinus tachycardia w/RBBB      Audra Sinclair DNP  [facility]  Department of Vascular Neurology   [facility] - Emergency Dept     This medical record was prepared using voice recognition software and may contain phonetic and/or or grammatical errors.  Garbled syntax, mangled pronounciations, and other bizarre constructions may be attributed to that system.    "

## 2024-04-11 NOTE — ED NOTES
Nurses Note -- 4 Eyes      4/11/2024   1:27 AM      Skin assessed during: Admit      [] No Altered Skin Integrity Present    []Prevention Measures Documented      [x] Yes- Altered Skin Integrity Present or Discovered   [x] LDA Added if Not in Epic (Describe Wound)   [] New Altered Skin Integrity was Present on Admit and Documented in LDA   [x] Wound Image Taken    Wound Care Consulted? Yes    Attending Nurse:  Saba Antony RN     Second RN/Staff Member:  chris

## 2024-04-11 NOTE — ASSESSMENT & PLAN NOTE
Pt currently taking detemir 25 BID, and moderate sliding scale aspart. Glucose elevated in the 300s on arrival. Last A1c 10    Plan  Give 3U of aspart in ED  Will reassess insulin regimen as well as tube feeding  Ordered current regimen with elevation in glucose

## 2024-04-11 NOTE — ASSESSMENT & PLAN NOTE
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 Prior cultures w/ proteus mirabilis and bacteroides.  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 she completed a course of meropenem for SSTI of right groin wound on 3/21/24 as repeat wound cultures +pseudomonas (cefepime, zosyn resistant). She is [facility] from [facility] for AMS and fevers.     CT abdomen/pelvis concerning for cystitis as well as a UA (obtained via straight cath) concerning for a UTI. She was initially started on vancomycin and Zosyn later broadened to meropenem. Tmax 100, WBC 17K on admission. She is currently on vancomycin and meroepnem. Blood cultures NGTD. CXR negaitve for focal consolidations.       Recommendations  Continue meropenem . D/c vancomycin  Will follow urine cultures and tailor abx accordingly  F/u blood cultures monitor for clinical improvement  Continue local wound care    Discussed with ID staff. ID will follow closely with you

## 2024-04-11 NOTE — HPI
The patient is a 53 y.o. 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 The patient is unable to provide adequate history. Additional history and patient information was obtained from patient's daughter, past medical records and ER records. Per chart, she was undergoing dialysis Wednesday and was noted to be more lethargic than usual despite completing her HD session. EMS was called, fever of a 100. In the ED, UA 2+ leuks, >100 WBC, many bacteria, WBC 17, CT abd/pelvis concerning for cystitis. Given vanc/zosyn and admitted.     Of note, the patient was initiated on RRT in February 2024 after being admitted with ALVARADO 2/2 iATN due to septic shock. Perm cath placed on 2/29/24. She was transferred to LT on 3/12. Interventional Nephrology was consulted for concerns of potential tunneled dialysis line infection. She developed fever on 6/22 with a T-max 38.1C and leukocytosis sporadically throughout her hospital stay with the most recent episode of leukocytosis being on 6/22 where it reached a peak of 18.94. She received meropenum through 6/22 and last dose of vancomycin was on 6/21. Cultures thus far are positive for Proteus Mirabilis ESBL from urine cultures on 4/11, Staph Epidermis in 2/2 vials on 4/10, pseudomonas in groin abscess on 3/8. All cultures taken this hospital stay have been negative.

## 2024-04-11 NOTE — H&P
[facility] - Emergency Dept  [facility] Medicine  History & Physical    Patient Name: Sarah Saravia  MRN: [id number]  Patient Class: IP- Inpatient  Admission Date: 4/10/2024  Attending Physician: Hola Liriano MD   Primary Care Provider: Deanna Primary Doctor         Patient information was obtained from relative(s) and ER records.     Subjective:     Principal Problem:Acute cystitis with hematuria    Chief Complaint:   Chief Complaint   Patient presents with    Altered Mental Status     Pt from [facility] extended care, nonverbal with trach at baseline from previous CVAs. Per extedned care Pt was unable to trach with eyes following dialysis today. Pt able to open eyes at this time, CBG in 300s with EMS, +fever of 100 F.         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 History was given from patient's daughter. She was undergoing dialysis today and noticed she was lethargic, less alert than usual self. Pt completed dialysis and still not acting herself. EMS was called, fever of a 100.  On chart review, she did have an episode of large volume emesis around 1700.  Per EMS, she had a slightly elevated temp 100.0°F, glucose 300s.     In the ED: UA 2+ leuks, >100 WBC, many bacteria, WBC 17, CT abd/pelvis concerning for cystitis. Given vanc/zosyn    No past medical history on file.    Past Surgical History:   Procedure Laterality Date    CLOSURE OF WOUND Right 2/22/2024    Procedure: CLOSURE, WOUND;  Surgeon: Steve Cole MD;  Location: [facility] OR [address] [place] [address];  Service: General;  Laterality: Right;    ESOPHAGOGASTRODUODENOSCOPY W/ PEG N/A 2/22/2024    Procedure: EGD, WITH PEG TUBE INSERTION;  Surgeon: Steve Cole MD;  Location: [facility] OR [address] [place] [address];  Service: General;  Laterality: N/A;    INCISION AND DRAINAGE OF PERIRECTAL REGION N/A 1/29/2024    Procedure: INCISION AND DRAINAGE, PERIRECTAL REGION;  Surgeon: Sobia  Axel MATIAS MD;  Location: [facility] OR;  Service: General;  Laterality: N/A;    LUMBAR PUNCTURE N/A 2/16/2024    Procedure: Lumbar Puncture;  Surgeon: Macy Boykin;  Location: [facility];  Service: Anesthesiology;  Laterality: N/A;    PLACEMENT, TRIALYSIS CATH Right 1/31/2024    Procedure: INSERTION, CATHETER, TRIPLE LUMEN, HEMODIALYSIS, TEMPORARY;  Surgeon: Alvin Junior MD;  Location: [facility] OR;  Service: General;  Laterality: Right;    REPLACEMENT OF WOUND VACUUM-ASSISTED CLOSURE DEVICE Right 2/12/2024    Procedure: REPLACEMENT, WOUND VAC;  Surgeon: Mundo Carmona MD;  Location: [facility] OR [facility];  Service: General;  Laterality: Right;    REPLACEMENT OF WOUND VACUUM-ASSISTED CLOSURE DEVICE N/A 2/15/2024    Procedure: REPLACEMENT, WOUND VAC;  Surgeon: Steve Cole MD;  [facility];  Service: General;  Laterality: N/A;    REPLACEMENT OF WOUND VACUUM-ASSISTED CLOSURE DEVICE Right 2/19/2024    Procedure: REPLACEMENT, WOUND VAC;  Surgeon: Steve Cole MD;  [facility];  Service: General;  Laterality: Right;  RLE/groin    REPLACEMENT OF WOUND VACUUM-ASSISTED CLOSURE DEVICE Right 2/22/2024    Procedure: REPLACEMENT, WOUND VAC;  Surgeon: Steve Cole MD;  [facility];  Service: General;  Laterality: Right;    TRACHEOSTOMY N/A 2/22/2024    Procedure: CREATION, TRACHEOSTOMY;  Surgeon: Steve Cole MD;  [facility];  Service: General;  Laterality: N/A;    WOUND DEBRIDEMENT Bilateral 2/2/2024    Procedure: DEBRIDEMENT, WOUND;  Surgeon: Steve Cole MD;  [facility];  Service: General;  Laterality: Bilateral;  Bilateral groin  Possible wound vac placement    WOUND DEBRIDEMENT Right 2/6/2024    Procedure: DEBRIDEMENT, WOUND, replace wound vac, possible closure;  Surgeon: Steve Cole MD;  Location: [facility] OR [facility];  Service: General;  Laterality: Right;  RLE    WOUND DEBRIDEMENT Right 2/9/2024    Procedure: DEBRIDEMENT, WOUND w wound vac change;   Surgeon: Steve Cole MD;  [facility];  Service: General;  Laterality: Right;  RLE    WOUND DEBRIDEMENT Right 2/12/2024    Procedure: R thigh wound debridement;  Surgeon: Mundo Carmona MD;  [facility];  Service: General;  Laterality: Right;    WOUND EXPLORATION Right 1/31/2024    Procedure: IRRIGATION & DEBRIDEMENT, WOUND DEBRIDEMENT;  Surgeon: Alvin Junior MD;  Location: [facility];  Service: General;  Laterality: Right;       Review of patient's allergies indicates:  No Known Allergies    Current Facility-Administered Medications on File Prior to Encounter   Medication    [MAR Hold - Suspended Admission] 0.9%  NaCl infusion    [MAR Hold - Suspended Admission] 0.9%  NaCl infusion    [MAR Hold - Suspended Admission] acetaminophen 160 mg/5 mL (5 mL) liquid (ADULTS) 649.6 mg    [MAR Hold - Suspended Admission] albuterol-ipratropium 2.5 mg-0.5 mg/3 mL nebulizer solution 3 mL    [COMPLETED] alteplase injection 4 mg    [MAR Hold - Suspended Admission] amLODIPine tablet 10 mg    [MAR Hold - Suspended Admission] ascorbic acid (vitamin C) tablet 500 mg    [MAR Hold - Suspended Admission] carvediloL tablet 25 mg    [MAR Hold - Suspended Admission] chlorhexidine 0.12 % solution 15 mL    [MAR Hold - Suspended Admission] dextrose 10% bolus 125 mL 125 mL    [MAR Hold - Suspended Admission] dextrose 10% bolus 250 mL 250 mL    [MAR Hold - Suspended Admission] glucagon (human recombinant) injection 1 mg    [MAR Hold - Suspended Admission] heparin (porcine) injection 1,000 Units    [MAR Hold - Suspended Admission] heparin (porcine) injection 7,500 Units    [MAR Hold - Suspended Admission] hydrALAZINE tablet 25 mg    [MAR Hold - Suspended Admission] insulin aspart U-100 pen 0-10 Units    [MAR Hold - Suspended Admission] insulin detemir U-100 (Levemir) pen 25 Units    [MAR Hold - Suspended Admission] labetaloL injection 10 mg    [MAR Hold - Suspended Admission] loperamide capsule 2 mg    [MAR  Hold - Suspended Admission] naloxone 0.4 mg/mL injection 0.02 mg    [MAR Hold - Suspended Admission] ondansetron injection 4 mg    [MAR Hold - Suspended Admission] pantoprazole suspension 40 mg    [MAR Hold - Suspended Admission] prochlorperazine injection Soln 5 mg    [MAR Hold - Suspended Admission] psyllium husk (aspartame) 3.4 gram packet 1 packet    [MAR Hold - Suspended Admission] sevelamer carbonate pwpk 2.4 g    [MAR Hold - Suspended Admission] sodium chloride 0.9% bolus 250 mL 250 mL    [MAR Hold - Suspended Admission] sodium chloride 0.9% flush 10 mL    [MAR Hold - Suspended Admission] zinc sulfate capsule 220 mg    [DISCONTINUED] insulin detemir U-100 (Levemir) pen 20 Units    [DISCONTINUED] sevelamer carbonate pwpk 1.6 g     No current outpatient medications on file prior to encounter.     Family History    None       Tobacco Use    Smoking status: Unknown    Smokeless tobacco: Not on file   Substance and Sexual Activity    Alcohol use: Not on file    Drug use: Not on file    Sexual activity: Not on file     Review of Systems   Reason unable to perform ROS: nonverbal.     Objective:     Vital Signs (Most Recent):  Temp: 98.8 °F (37.1 °C) (04/11/24 0225)  Pulse: 99 (04/11/24 0225)  Resp: 20 (04/11/24 0225)  BP: 113/63 (04/11/24 0225)  SpO2: 95 % (04/11/24 0225) Vital Signs (24h Range):  Temp:  [96.9 °F (36.1 °C)-100 °F (37.8 °C)] 98.8 °F (37.1 °C)  Pulse:  [] 99  Resp:  [19-40] 20  SpO2:  [95 %-100 %] 95 %  BP: ()/(54-91) 113/63     Weight: 122.5 kg (270 lb 1 oz)  Body mass index is 42.3 kg/m².     Physical Exam  Constitutional:       General: She is not in acute distress.     Appearance: She is ill-appearing.      Comments: Trach in place, no purulence    HENT:      Head: Normocephalic and atraumatic.   Eyes:      General: No scleral icterus.     Extraocular Movements: Extraocular movements intact.   Cardiovascular:      Rate and Rhythm: Normal rate and regular rhythm.   Pulmonary:       Effort: Pulmonary effort is normal. No respiratory distress.   Abdominal:      General: Abdomen is flat. There is no distension.      Palpations: Abdomen is soft.      Tenderness: There is no abdominal tenderness.      Comments: Peg tube in place, no purulence noted   Genitourinary:     Comments: Has large bandage over groin area. Pictures in chart  Musculoskeletal:      Right lower leg: No edema.      Left lower leg: No edema.      Comments: Moves BUE spontaneously   Skin:     General: Skin is warm and dry.      Findings: Wound present.   Neurological:      Mental Status: She is alert.      Comments: Nonverbal   Psychiatric:      Comments: Nonverbal                Significant Labs: All pertinent labs within the past 24 hours have been reviewed.    Significant Imaging: I have reviewed all pertinent imaging results/findings within the past 24 hours.  Assessment/Plan:     * Acute cystitis with hematuria  Pt presenting with AMS and worsening lethargy. Pt is non-verbal at baseline, does not follow commands, but was less responsive than normal. Pt found to have a fever. Urinary source suspected based off of urine and CT abd/pelvis. Pt has many prior resistant bacterial infections including urinary sources. Has prior with sensitivity to zosyn and has also improved off ED dose of vanc/zosyn. Lactic elevated a 3.8->3.49 prior to completion of fluid bolus 500ml    Plan  Vanc/zosyn cont  ID consult  Concern for giving too much fluid and causing volume overload, 500ml,   Repeat lactic  Bcx2  UC  Daily cbc  Contact precautions        Prolonged QT interval  Qtc 526, limit Qtc prolonging drugs as able      ESRD (end stage renal disease) on dialysis  Creatine stable for now. BMP reviewed- noted Estimated Creatinine Clearance: 15.2 mL/min (A) (based on SCr of 5.8 mg/dL (H)). according to latest data. Based on current GFR, CKD stage is end stage.  Monitor UOP and serial BMP and adjust therapy as needed. Renally dose meds. Avoid  nephrotoxic medications and procedures.    Pt MWF HD, underwent on 4/10, complete session  Plan  Nephrology consult  Careful with IV fluid  Monitor fluid status    Acute encephalopathy  Pt is non-verbal and does not follow commands at baseline, see AMS      Type 2 diabetes mellitus with hyperglycemia, with long-term current use of insulin  Pt currently taking detemir 25 BID, and moderate sliding scale aspart. Glucose elevated in the 300s on arrival. 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 Pt underwent extensive course, currently still healing from this, but no longer has wound vac. Pt's wound currently covered with bandage. Picture in media tabs    Plan  Wound care        VTE Risk Mitigation (From admission, onward)           Ordered     heparin (porcine) injection 7,500 Units  Every 8 hours         04/11/24 0252                               Pharmacokinetic Initial Assessment: IV Vancomycin    Assessment/Plan:    Initiate intravenous vancomycin with loading dose of 2000 mg once with subsequent doses when random concentrations are less than 20 mcg/mL  Desired empiric serum trough concentration is 10 to 20 mcg/mL  Draw vancomycin random level on 4/12 at 0400 with AM labs.  Pharmacy will continue to follow and monitor vancomycin.      Please contact pharmacy at extension [phone] with any questions regarding this assessment.     Thank you for the consult,   Kathe Ingram       Patient brief summary:  Sarah Saravia is a 53 y.o. female initiated on antimicrobial therapy with IV Vancomycin for treatment of suspected urinary tract infection    Drug Allergies:   Review of patient's allergies indicates:  No Known Allergies    Actual Body Weight:   122.5 kg    Renal Function:   Estimated Creatinine Clearance: 15.2  "mL/min (A) (based on SCr of 5.8 mg/dL (H)).    Dialysis Method (if applicable):  intermittent HD    CBC (last 72 hours):  Recent Labs   Lab Result Units 04/08/24  0442 04/10/24  0429 04/10/24  2243   WBC K/uL 13.63* 14.83* 17.15*   Hemoglobin g/dL 9.8* 10.7* 11.4*   Hematocrit % 31.3* 34.2* 36.1*   Platelets K/uL 328 366 297   Gran % %  --   --  70.3   Lymph % %  --   --  18.3   Mono % %  --   --  8.0   Eosinophil % %  --   --  2.2   Basophil % %  --   --  0.4   Differential Method   --   --  Automated       Metabolic Panel (last 72 hours):  Recent Labs   Lab Result Units 04/08/24  0442 04/10/24  0429 04/11/24  0101 04/11/24  0103   Sodium mmol/L 133* 135* 135*  --    Potassium mmol/L 4.8 5.1 4.7  --    Chloride mmol/L 95 95 95  --    CO2 mmol/L 18* 19* 22*  --    Glucose mg/dL 224* 244* 306*  --    Glucose, UA   --   --   --  1+*   BUN mg/dL 114* 113* 72*  --    Creatinine mg/dL 7.8* 8.4* 5.8*  --    Albumin g/dL 3.3*  3.3* 3.6  3.6 3.4*  --    Total Bilirubin mg/dL 0.2 0.2 0.3  --    Alkaline Phosphatase U/L 145* 149* 137*  --    AST U/L 18 19 25  --    ALT U/L 33 34 33  --    Magnesium mg/dL 3.1* 3.2*  --   --    Phosphorus mg/dL 6.6* 6.6*  --   --        Drug levels (last 3 results):  No results for input(s): "VANCOMYCINRA", "VANCORANDOM", "VANCOMYCINPE", "VANCOPEAK", "VANCOMYCINTR", "VANCOTROUGH" in the last 72 hours.    Microbiologic Results:  Microbiology Results (last 7 days)       Procedure Component Value Units Date/Time    Urine culture [[id number]] Collected: 04/11/24 0103    Order Status: No result Specimen: Urine Updated: 04/11/24 0142    Blood culture x two cultures. Draw prior to antibiotics. [[id number]] Collected: 04/10/24 2243    Order Status: Sent Specimen: Blood from Peripheral, Hand, Right Updated: 04/10/24 2303    Blood culture x two cultures. Draw prior to antibiotics. [[id number]] Collected: 04/10/24 2243    Order Status: Sent Specimen: Blood from Peripheral, Antecubital, Right Updated: " 04/10/24 2305              Kristopher Tyler DO  Department of Hospital Medicine  [facility] - Emergency Dept

## 2024-04-11 NOTE — PHARMACY MED REC
"Admission Medication History     The home medication history was taken by Gabby Bassett.    You may go to "Admission" then "Reconcile Home Medications" tabs to review and/or act upon these items.     The home medication list has been updated by the Pharmacy department.   Please read ALL comments highlighted in yellow.   Please address this information as you see fit.    Feel free to contact us if you have any questions or require assistance.    Medications listed below were obtained from: SNF/Rehab/LTAC     Medications 04/07 04/08 04/09 04/10 04/11   amLODIPine tablet 10 mg  Dose: 10 mg  Freq: Daily Route: PER G TUBE  Start: 03/13/24 0900    24408    0917 [C]4    77513    27153   63629    69779      ascorbic acid (vitamin C) tablet 500 mg  Dose: 500 mg  Freq: 2 times daily Route: PER G TUBE  Start: 03/12/24 2100 093113   302661    547236   514404    969195   263180    244969   2100   421249    959125   251088      carvediloL tablet 25 mg  Dose: 25 mg  Freq: 2 times daily Route: PER G TUBE  Start: 03/12/24 2100 093129   428918    0917 [C]31   117001    295169   975104    419861   2100   726277    122672   862054      chlorhexidine 0.12 % solution 15 mL  Dose: 15 mL  Freq: 2 times daily Route: MT  Start: 03/12/24 2100    706274   346952    633828   302936    999449   676592    268129   2100   924522    331824   813579      heparin (porcine) injection 7,500 Units  Dose: 7,500 Units  Freq: Every 8 hours Route: SubQ  Start: 03/12/24 2200    213840   679276   946305    911081   463669   421186    507641   274092   416784    856016   784699   434874   228107    832130   229210   173751      insulin detemir U-100 (Levemir) pen 25 Units  Dose: 25 Units  Freq: 2 times daily Route: SubQ  Start: 04/10/24 2100       2100   962902    121984   543870      pantoprazole suspension 40 mg  Dose: 40 mg  Freq: Daily Route: PER G TUBE  Start: 03/13/24 0900    6240775    9512791    4591406    1235457   7019528    1361883    "   psyllium husk (aspartame) 3.4 gram packet 1 packet  Dose: 1 packet  Freq: Daily Route: PER G TUBE  Start: 03/13/24 0900    7365169    7580711    4465114    6972828   0841043    3097560      sevelamer carbonate pwpk 2.4 g  Dose: 2.4 g  Freq: 3 times daily Route: PER G TUBE  Start: 04/10/24 1500       8113301   2100   2781893    8016818   6557890   7412378      zinc sulfate capsule 220 mg  Dose: 220 mg  Freq: Daily Route: PER G TUBE  Start: 03/13/24 0900    7778336    8840826    4687917    6329801   1428408             Potential issues to be addressed PRIOR TO DISCHARGE  The listed medications were obtained from another facility ([facility]). The patient may not have been able to fill these prescriptions prior to this admission and may require new scripts upon discharge.           Gabby Bassett  EXT [id number]                  .

## 2024-04-12 LAB
ALBUMIN SERPL BCP-MCNC: 3.4 G/DL (ref 3.5–5.2)
ALP SERPL-CCNC: 109 U/L (ref 55–135)
ALT SERPL W/O P-5'-P-CCNC: 33 U/L (ref 10–44)
ANION GAP SERPL CALC-SCNC: 19 MMOL/L (ref 8–16)
AST SERPL-CCNC: 22 U/L (ref 10–40)
BASOPHILS # BLD AUTO: 0.05 K/UL (ref 0–0.2)
BASOPHILS NFR BLD: 0.4 % (ref 0–1.9)
BILIRUB SERPL-MCNC: 0.3 MG/DL (ref 0.1–1)
BUN SERPL-MCNC: 83 MG/DL (ref 6–20)
CALCIUM SERPL-MCNC: 10.8 MG/DL (ref 8.7–10.5)
CHLORIDE SERPL-SCNC: 94 MMOL/L (ref 95–110)
CO2 SERPL-SCNC: 20 MMOL/L (ref 23–29)
CREAT SERPL-MCNC: 7.8 MG/DL (ref 0.5–1.4)
DIFFERENTIAL METHOD BLD: ABNORMAL
EOSINOPHIL # BLD AUTO: 0.5 K/UL (ref 0–0.5)
EOSINOPHIL NFR BLD: 3.9 % (ref 0–8)
ERYTHROCYTE [DISTWIDTH] IN BLOOD BY AUTOMATED COUNT: 15.7 % (ref 11.5–14.5)
EST. GFR  (NO RACE VARIABLE): 5.7 ML/MIN/1.73 M^2
GLUCOSE SERPL-MCNC: 279 MG/DL (ref 70–110)
HCT VFR BLD AUTO: 34.9 % (ref 37–48.5)
HGB BLD-MCNC: 10.8 G/DL (ref 12–16)
IMM GRANULOCYTES # BLD AUTO: 0.1 K/UL (ref 0–0.04)
IMM GRANULOCYTES NFR BLD AUTO: 0.7 % (ref 0–0.5)
LYMPHOCYTES # BLD AUTO: 2.6 K/UL (ref 1–4.8)
LYMPHOCYTES NFR BLD: 19.5 % (ref 18–48)
MAGNESIUM SERPL-MCNC: 2.6 MG/DL (ref 1.6–2.6)
MCH RBC QN AUTO: 28.3 PG (ref 27–31)
MCHC RBC AUTO-ENTMCNC: 30.9 G/DL (ref 32–36)
MCV RBC AUTO: 91 FL (ref 82–98)
MONOCYTES # BLD AUTO: 1.6 K/UL (ref 0.3–1)
MONOCYTES NFR BLD: 11.6 % (ref 4–15)
MRSA ID BY PCR: NEGATIVE
NEUTROPHILS # BLD AUTO: 8.7 K/UL (ref 1.8–7.7)
NEUTROPHILS NFR BLD: 63.9 % (ref 38–73)
NRBC BLD-RTO: 0 /100 WBC
PHOSPHATE SERPL-MCNC: 5.6 MG/DL (ref 2.7–4.5)
PLATELET # BLD AUTO: 265 K/UL (ref 150–450)
PMV BLD AUTO: 10.9 FL (ref 9.2–12.9)
POCT GLUCOSE: 163 MG/DL (ref 70–110)
POCT GLUCOSE: 229 MG/DL (ref 70–110)
POCT GLUCOSE: 281 MG/DL (ref 70–110)
POCT GLUCOSE: 298 MG/DL (ref 70–110)
POCT GLUCOSE: 326 MG/DL (ref 70–110)
POTASSIUM SERPL-SCNC: 4.5 MMOL/L (ref 3.5–5.1)
PROT SERPL-MCNC: 9.3 G/DL (ref 6–8.4)
RBC # BLD AUTO: 3.82 M/UL (ref 4–5.4)
SODIUM SERPL-SCNC: 133 MMOL/L (ref 136–145)
STAPH AUREUS ID BY PCR: NEGATIVE
VANCOMYCIN SERPL-MCNC: 21 UG/ML
WBC # BLD AUTO: 13.55 K/UL (ref 3.9–12.7)

## 2024-04-12 PROCEDURE — 80100014 HC HEMODIALYSIS 1:1

## 2024-04-12 PROCEDURE — 99900035 HC TECH TIME PER 15 MIN (STAT)

## 2024-04-12 PROCEDURE — 99900026 HC AIRWAY MAINTENANCE (STAT)

## 2024-04-12 PROCEDURE — 63600175 PHARM REV CODE 636 W HCPCS

## 2024-04-12 PROCEDURE — 90935 HEMODIALYSIS ONE EVALUATION: CPT | Mod: ,,, | Performed by: NURSE PRACTITIONER

## 2024-04-12 PROCEDURE — 94761 N-INVAS EAR/PLS OXIMETRY MLT: CPT

## 2024-04-12 PROCEDURE — 63600175 PHARM REV CODE 636 W HCPCS: Performed by: STUDENT IN AN ORGANIZED HEALTH CARE EDUCATION/TRAINING PROGRAM

## 2024-04-12 PROCEDURE — 25000003 PHARM REV CODE 250: Performed by: STUDENT IN AN ORGANIZED HEALTH CARE EDUCATION/TRAINING PROGRAM

## 2024-04-12 PROCEDURE — 99233 SBSQ HOSP IP/OBS HIGH 50: CPT | Mod: ,,, | Performed by: PHYSICIAN ASSISTANT

## 2024-04-12 PROCEDURE — 80202 ASSAY OF VANCOMYCIN: CPT | Performed by: STUDENT IN AN ORGANIZED HEALTH CARE EDUCATION/TRAINING PROGRAM

## 2024-04-12 PROCEDURE — 25000003 PHARM REV CODE 250: Performed by: NURSE PRACTITIONER

## 2024-04-12 PROCEDURE — 85025 COMPLETE CBC W/AUTO DIFF WBC: CPT

## 2024-04-12 PROCEDURE — 84100 ASSAY OF PHOSPHORUS: CPT

## 2024-04-12 PROCEDURE — 36415 COLL VENOUS BLD VENIPUNCTURE: CPT

## 2024-04-12 PROCEDURE — 83735 ASSAY OF MAGNESIUM: CPT

## 2024-04-12 PROCEDURE — 80053 COMPREHEN METABOLIC PANEL: CPT

## 2024-04-12 PROCEDURE — 36593 DECLOT VASCULAR DEVICE: CPT

## 2024-04-12 PROCEDURE — 25000003 PHARM REV CODE 250

## 2024-04-12 PROCEDURE — 20600001 HC STEP DOWN PRIVATE ROOM

## 2024-04-12 PROCEDURE — 63600175 PHARM REV CODE 636 W HCPCS: Mod: JZ,JG | Performed by: NURSE PRACTITIONER

## 2024-04-12 PROCEDURE — 27000207 HC ISOLATION

## 2024-04-12 PROCEDURE — 27000221 HC OXYGEN, UP TO 24 HOURS

## 2024-04-12 PROCEDURE — 27200966 HC CLOSED SUCTION SYSTEM

## 2024-04-12 RX ORDER — INSULIN ASPART 100 [IU]/ML
3 INJECTION, SOLUTION INTRAVENOUS; SUBCUTANEOUS
Status: DISCONTINUED | OUTPATIENT
Start: 2024-04-12 | End: 2024-04-12

## 2024-04-12 RX ORDER — INSULIN ASPART 100 [IU]/ML
3 INJECTION, SOLUTION INTRAVENOUS; SUBCUTANEOUS EVERY 4 HOURS
Status: DISCONTINUED | OUTPATIENT
Start: 2024-04-12 | End: 2024-04-12

## 2024-04-12 RX ORDER — DEXTROSE MONOHYDRATE 100 MG/ML
INJECTION, SOLUTION INTRAVENOUS CONTINUOUS PRN
Status: DISCONTINUED | OUTPATIENT
Start: 2024-04-12 | End: 2024-06-12

## 2024-04-12 RX ORDER — HEPARIN SODIUM 1000 [USP'U]/ML
1000 INJECTION, SOLUTION INTRAVENOUS; SUBCUTANEOUS
Status: DISCONTINUED | OUTPATIENT
Start: 2024-04-12 | End: 2024-05-16

## 2024-04-12 RX ORDER — NAPROXEN SODIUM 220 MG/1
81 TABLET, FILM COATED ORAL DAILY
Status: DISCONTINUED | OUTPATIENT
Start: 2024-04-12 | End: 2024-06-10

## 2024-04-12 RX ORDER — INSULIN ASPART 100 [IU]/ML
3 INJECTION, SOLUTION INTRAVENOUS; SUBCUTANEOUS EVERY 4 HOURS
Status: DISCONTINUED | OUTPATIENT
Start: 2024-04-12 | End: 2024-04-17

## 2024-04-12 RX ADMIN — INSULIN DETEMIR 25 UNITS: 100 INJECTION, SOLUTION SUBCUTANEOUS at 08:04

## 2024-04-12 RX ADMIN — HEPARIN SODIUM 7500 UNITS: 5000 INJECTION INTRAVENOUS; SUBCUTANEOUS at 03:04

## 2024-04-12 RX ADMIN — SEVELAMER CARBONATE 2.4 G: 2400 POWDER, FOR SUSPENSION ORAL at 09:04

## 2024-04-12 RX ADMIN — INSULIN ASPART 3 UNITS: 100 INJECTION, SOLUTION INTRAVENOUS; SUBCUTANEOUS at 09:04

## 2024-04-12 RX ADMIN — INSULIN ASPART 8 UNITS: 100 INJECTION, SOLUTION INTRAVENOUS; SUBCUTANEOUS at 08:04

## 2024-04-12 RX ADMIN — Medication 500 MG: at 09:04

## 2024-04-12 RX ADMIN — ALTEPLASE 4 MG: 2.2 INJECTION, POWDER, LYOPHILIZED, FOR SOLUTION INTRAVENOUS at 10:04

## 2024-04-12 RX ADMIN — SEVELAMER CARBONATE 2.4 G: 2400 POWDER, FOR SUSPENSION ORAL at 12:04

## 2024-04-12 RX ADMIN — HEPARIN SODIUM 1000 UNITS: 1000 INJECTION, SOLUTION INTRAVENOUS; SUBCUTANEOUS at 01:04

## 2024-04-12 RX ADMIN — INSULIN ASPART 3 UNITS: 100 INJECTION, SOLUTION INTRAVENOUS; SUBCUTANEOUS at 05:04

## 2024-04-12 RX ADMIN — SODIUM CHLORIDE: 9 INJECTION, SOLUTION INTRAVENOUS at 09:04

## 2024-04-12 RX ADMIN — MEROPENEM 500 MG: 1 INJECTION INTRAVENOUS at 08:04

## 2024-04-12 RX ADMIN — INSULIN ASPART 4 UNITS: 100 INJECTION, SOLUTION INTRAVENOUS; SUBCUTANEOUS at 12:04

## 2024-04-12 RX ADMIN — HEPARIN SODIUM 7500 UNITS: 5000 INJECTION INTRAVENOUS; SUBCUTANEOUS at 09:04

## 2024-04-12 RX ADMIN — INSULIN ASPART 2 UNITS: 100 INJECTION, SOLUTION INTRAVENOUS; SUBCUTANEOUS at 05:04

## 2024-04-12 RX ADMIN — SEVELAMER CARBONATE 2.4 G: 2400 POWDER, FOR SUSPENSION ORAL at 04:04

## 2024-04-12 NOTE — PLAN OF CARE
Recommendation/Intervention:   1) TF for nutrition support- Novasource Renal @ 40 mL/hr to provide 1920 kcal, 87 g protein, 688 mL H2O per day              - FWF per MD  2) If bolus TF desired. Novasource Renal 240 mL 4x/day to provide 1920 kcal, 87 g protein, 688 mL H2O per day  - FWF per MD  3) RD to monitor and follow-up as needed     Goals: Meet % of EEN/EPN by next RD follow-up  Nutrition Goal Status: new  Communication of RD Recs: other (comment) (POC)

## 2024-04-12 NOTE — PROGRESS NOTES
Pharmacokinetic Assessment Follow Up: IV Vancomycin    Vancomycin serum concentration assessment/plan(s):    -Vancomycin preHD level 21.0, above goal of 15 - 20 for bacteremia  -No re-dose post HD today  -Next random level Monday pre-HD, plans for re-dosing with levels < 20    Drug levels (last 3 results):  Recent Labs   Lab Result Units 04/12/24  0935   Vancomycin, Random ug/mL 21.0       Pharmacy will continue to follow and monitor vancomycin.    Please contact pharmacy at extension [phone] for questions regarding this assessment.    Thank you for the consult,   Brett Ramirez       Patient brief summary:  Sarah Saravia is a 53 y.o. female initiated on antimicrobial therapy with IV Vancomycin for treatment of bacteremia    The patient's current regimen is vancomycin pulse dosing    Drug Allergies:   Review of patient's allergies indicates:  No Known Allergies    Actual Body Weight:   123 kg    Renal Function:   Estimated Creatinine Clearance: 11.3 mL/min (A) (based on SCr of 7.8 mg/dL (H)).,     Dialysis Method (if applicable):  intermittent HD    CBC (last 72 hours):  Recent Labs   Lab Result Units 04/10/24  0429 04/10/24  2243 04/11/24  1342 04/12/24  0618   WBC K/uL 14.83* 17.15* 16.30* 13.55*   Hemoglobin g/dL 10.7* 11.4* 10.4* 10.8*   Hematocrit % 34.2* 36.1* 33.4* 34.9*   Platelets K/uL 366 297 265 265   Gran % %  --  70.3 69.2 63.9   Lymph % %  --  18.3 17.4* 19.5   Mono % %  --  8.0 9.0 11.6   Eosinophil % %  --  2.2 2.8 3.9   Basophil % %  --  0.4 0.6 0.4   Differential Method   --  Automated Automated Automated       Metabolic Panel (last 72 hours):  Recent Labs   Lab Result Units 04/10/24  0429 04/11/24  0101 04/11/24  0103 04/12/24  0618   Sodium mmol/L 135* 135*  --  133*   Potassium mmol/L 5.1 4.7  --  4.5   Chloride mmol/L 95 95  --  94*   CO2 mmol/L 19* 22*  --  20*   Glucose mg/dL 244* 306*  --  279*   Glucose, UA   --   --  1+*  --    BUN mg/dL 113* 72*  --  83*   Creatinine mg/dL 8.4* 5.8*  --   7.8*   Albumin g/dL 3.6  3.6 3.4*  --  3.4*   Total Bilirubin mg/dL 0.2 0.3  --  0.3   Alkaline Phosphatase U/L 149* 137*  --  109   AST U/L 19 25  --  22   ALT U/L 34 33  --  33   Magnesium mg/dL 3.2*  --   --  2.6   Phosphorus mg/dL 6.6*  --   --  5.6*       Vancomycin Administrations:  vancomycin given in the last 96 hours                     vancomycin 2 g in dextrose 5 % 500 mL IVPB (mg) 2,000 mg New Bag 04/11/24 0100                    Microbiologic Results:  Microbiology Results (last 7 days)       Procedure Component Value Units Date/Time    Blood culture [[id number]]     Order Status: Sent Specimen: Blood     Blood culture [[id number]]     Order Status: Sent Specimen: Blood     MRSA/SA Rapid ID by PCR from Blood culture [[id number]] Collected: 04/10/24 2243    Order Status: Completed Updated: 04/12/24 0623     Staph aureus ID by PCR Negative     Methicillin Resistant ID by PCR Negative    Narrative:      Aerobic and anaerobic    Blood culture x two cultures. Draw prior to antibiotics. [[id number]] Collected: 04/10/24 2243    Order Status: Completed Specimen: Blood from Peripheral, Hand, Right Updated: 04/12/24 0612     Blood Culture, Routine No Growth to date      No Growth to date    Narrative:      Aerobic and anaerobic    Blood culture x two cultures. Draw prior to antibiotics. [[id number]] Collected: 04/10/24 2243    Order Status: Completed Specimen: Blood from Peripheral, Antecubital, Right Updated: 04/12/24 0508     Blood Culture, Routine Gram stain aer bottle: Gram positive cocci in clusters resembling Staph      Gram stain erasmo bottle: Gram positive cocci in clusters resembling Staph      Results called to and read back by: Radha Starks 04/12/2024  05:06    Narrative:      Aerobic and anaerobic    Urine culture [[id number]]  (Abnormal) Collected: 04/11/24 0103    Order Status: Completed Specimen: Urine Updated: 04/11/24 2356     Urine Culture, Routine GRAM NEGATIVE CHRISTIAN  10,000 - 49,999  cfu/ml  Identification and susceptibility pending      Narrative:      Specimen Source->Urine

## 2024-04-12 NOTE — CONSULTS
"  [facility] - Telemetry Stepdown  Adult Nutrition  Consult Note    SUMMARY     Recommendation/Intervention:   1) TF for nutrition support- Novasource Renal @ 40 mL/hr to provide 1920 kcal, 87 g protein, 688 mL H2O per day   - FWF per MD  2) If bolus TF desired. Novasource Renal 240 mL 4x/day to provide 1920 kcal, 87 g protein, 688 mL H2O per day  - FWF per MD  3) RD to monitor and follow-up as needed    Goals: Meet % of EEN/EPN by next RD follow-up  Nutrition Goal Status: new  Communication of RD Recs: other (comment) (POC)    Assessment and Plan    Nutrition Problem  Inadequate PO intake    Related to (etiology):   Inability to consume sufficient kcal/protien    Signs and Symptoms (as evidenced by):   NPO status, PEG     Interventions/Recommendations (treatment strategy):  Collaboration with other providers  Enteral nutrition    Nutrition Diagnosis Status:   New    Reason for Assessment    Reason For Assessment: consult  Diagnosis: other (see comments) (Acute cystitis with hematuria)  Relevant Medical History: CVA now nonverbal with the tracheostomy and PEG, uncontrolled diabetes, 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 Pt nonverbal with trach/PEG. Transferred from [facility] (was receiving Nepro TF at [facility]). Currently off floor for dialysis- will complete NFPE at f/u if warranted. NPO with TF ordered- not running at this time  Nutrition Discharge Planning: TF via PEG    Nutrition Risk Screen    Nutrition Risk Screen: tube feeding or parenteral nutrition    Nutrition/Diet History    Food Allergies: NKFA    Anthropometrics    Temp: 98.2 °F (36.8 °C)  Height Method: Stated  Height: 5' 7" (170.2 cm)  Height (inches): 67 in  Weight Method: Bed Scale  Weight: 122.6 kg (270 lb 4.5 oz)  Weight (lb): 270.29 lb  Ideal Body Weight (IBW), Female: 135 lb  % Ideal Body Weight, Female " (lb): 200.21 %  BMI (Calculated): 42.3  BMI Grade: greater than 40 - morbid obesity       Lab/Procedures/Meds    Pertinent Labs Reviewed: reviewed  Pertinent Labs Comments: Hgb: 10.8, Hct: 34.9, Na: 133, CO2: 20, BUN: 83, Creatinine: 7.8, eGFR: 5.7, Glucose: 279, Calcium: 10.8, Phosphorus: 5.6, A1C: 10.4  Pertinent Medications Reviewed: reviewed   sodium chloride 0.9%   Intravenous Once    ascorbic acid (vitamin C)  500 mg Per G Tube BID    aspirin  81 mg Per G Tube Daily    heparin (porcine)  7,500 Units Subcutaneous Q8H    insulin aspart U-100  3 Units Subcutaneous Q4H    insulin detemir U-100 (Levemir)  25 Units Subcutaneous BID    meropenem IV (PEDS and ADULTS)  500 mg Intravenous Q12H    pantoprazole  40 mg Per G Tube Daily    psyllium husk (aspartame)  1 packet Per G Tube Daily    sevelamer carbonate  2.4 g Per G Tube TID    zinc sulfate  220 mg Per G Tube Daily     Estimated/Assessed Needs    Weight Used For Calorie Calculations: 122.6 kg (270 lb 4.5 oz)  Energy Calorie Requirements (kcal): 9756-0258 kcal/day  Energy Need Method: [place]-St Jeor (x 1-1.2)  Protein Requirements: 80-92 g (1.3-1.5 g/kg IBW)  Weight Used For Protein Calculations: 61.2 kg (135 lb)  Fluid Requirements (mL): 1 mL/kcal or per MD  Estimated Fluid Requirement Method: RDA Method  RDA Method (mL): 1863  CHO Requirement: 233 g    Nutrition Prescription Ordered    Current Diet Order: NPO  Current Nutrition Support Formula Ordered: Novasource Renal  Current Nutrition Support Rate Ordered: 20 (ml)  Current Nutrition Support Frequency Ordered: mL/hr (Not running at this time)    Evaluation of Received Nutrient/Fluid Intake    I/O: + 1.6 L since admit  Energy Calories Required: not meeting needs  Protein Required: not meeting needs  Fluid Required: other (see comments) (As per MD)  Comments: LBM 4/10  % Intake of Estimated Energy Needs: 0 - 25 %  % Meal Intake: NPO    Nutrition Risk    Level of Risk/Frequency of Follow-up:  (F/u 1-2x/week)      Monitor and Evaluation    Food and Nutrient Intake: energy intake, enteral nutrition intake  Food and Nutrient Adminstration: enteral and parenteral nutrition administration  Anthropometric Measurements: weight, weight change, body mass index  Biochemical Data, Medical Tests and Procedures: electrolyte and renal panel, gastrointestinal profile, glucose/endocrine profile, inflammatory profile, lipid profile  Nutrition-Focused Physical Findings: overall appearance     Nutrition Follow-Up    RD Follow-up?: Yes

## 2024-04-12 NOTE — PROGRESS NOTES
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 History was given from patient's daughter. She was undergoing dialysis today and noticed she was lethargic, less alert than usual self. Pt completed dialysis and still not acting herself. EMS was called, fever of a 100.  On chart review, she did have an episode of large volume emesis around 1700.  Per EMS, she had a slightly elevated temp 100.0°F, glucose 300s.     In the ED: UA 2+ leuks, >100 WBC, many bacteria, WBC 17, CT abd/pelvis concerning for cystitis. Given vanc/zosyn    Overview/Hospital Course:  Patient was admitted to Hospital Medicine service for medical management and evaluation of urosepsis. Patient was continued on vanc/zosyn. Vascular Neurology consulted concerning mental status change with the recommendation to initiate ASA 81 QD and to obtain an MRI to r/o new stroke. Imaging pending. Nephrology was consulted for regularly scheduled dialysis. Afternoon of 4/12, patient was unable to tolerate filtration of volume during dialsis 2/2 hypotension. Patient received 500cc bolus and was transferred back to floor after finishing the session without volume removed. Will monitor for signs of volume overload.    Interval History: NAEON. Patient underwent dialysis today, but was unable to tolerate volume removal due to hypotension. Received 500cc bolus and underwent remaining dialysis session without volume filtration. Patient vitally stable and volume stats appears unchanged; will monitor for  hypoxia/hypervolemia. Otherwise, added vancomycin today due to growth of GPCs in 2/4 bottles; ID following.    Review of Systems   Reason unable to perform ROS: nonverbal.     Objective:     Vital Signs (Most Recent):  Temp: 99 °F (37.2 °C) (04/12/24 1541)  Pulse: 107 (04/12/24 1541)  Resp: 20 (04/12/24 1541)  BP: 110/70 (04/12/24 1541)  SpO2: 99 % (04/12/24 1541) Vital Signs (24h Range):  Temp:  [97.7 °F (36.5 °C)-99 °F (37.2 °C)] 99 °F (37.2 °C)  Pulse:  [] 107  Resp:  [18-21] 20  SpO2:  [98 %-100 %] 99 %  BP: ()/(64-86) 110/70     Weight: 122.6 kg (270 lb 4.5 oz)  Body mass index is 42.33 kg/m².    Intake/Output Summary (Last 24 hours) at 4/12/2024 1646  Last data filed at 4/12/2024 1430  Gross per 24 hour   Intake 2450.91 ml   Output 384 ml   Net 2066.91 ml         Physical Exam  Constitutional:       General: She is not in acute distress.     Appearance: She is ill-appearing.      Comments: Trach in place, no purulence    HENT:      Head: Normocephalic and atraumatic.   Eyes:      General: No scleral icterus.     Extraocular Movements: Extraocular movements intact.   Cardiovascular:      Rate and Rhythm: Normal rate and regular rhythm.   Pulmonary:      Effort: Pulmonary effort is normal. No respiratory distress.   Abdominal:      General: Abdomen is flat. There is no distension.      Palpations: Abdomen is soft.      Tenderness: There is no abdominal tenderness.      Comments: Peg tube in place, no purulence noted   Genitourinary:     Comments: Has large bandage over groin area. Pictures in chart  Musculoskeletal:      Right lower leg: No edema.      Left lower leg: No edema.      Comments: Moves BUE spontaneously   Skin:     General: Skin is warm and dry.      Findings: Wound present.   Neurological:      Mental Status: She is alert.      Comments: Nonverbal   Psychiatric:      Comments: Nonverbal             Significant Labs: All pertinent labs within the past 24 hours have been reviewed.  Blood  Culture:   Recent Labs   Lab 04/10/24  2243   LABBLOO Gram stain erasmo bottle: Gram positive cocci in clusters resembling Staph  Positive results previously called 04/12/2024  Gram stain aer bottle: Gram positive cocci in clusters resembling Staph  Gram stain erasmo bottle: Gram positive cocci in clusters resembling Staph  Results called to and read back by: Radha Starks 04/12/2024  05:06     CBC:   Recent Labs   Lab 04/10/24  2243 04/11/24  1342 04/12/24  0618   WBC 17.15* 16.30* 13.55*   HGB 11.4* 10.4* 10.8*   HCT 36.1* 33.4* 34.9*    265 265     CMP:   Recent Labs   Lab 04/11/24  0101 04/12/24  0618   * 133*   K 4.7 4.5   CL 95 94*   CO2 22* 20*   * 279*   BUN 72* 83*   CREATININE 5.8* 7.8*   CALCIUM 10.7* 10.8*   PROT 9.7* 9.3*   ALBUMIN 3.4* 3.4*   BILITOT 0.3 0.3   ALKPHOS 137* 109   AST 25 22   ALT 33 33   ANIONGAP 18* 19*       Significant Imaging: I have reviewed all pertinent imaging results/findings within the past 24 hours.    Assessment/Plan:      * Acute cystitis with hematuria  Pt presenting with AMS and worsening lethargy. Pt is non-verbal at baseline, does not follow commands, but was less responsive than normal. Pt found to have a fever. Urinary source suspected based off of urine and CT abd/pelvis. Pt has many prior resistant bacterial infections including urinary sources. Has prior with sensitivity to zosyn and has also improved off ED dose of vanc/zosyn. Lactic elevated a 3.8->3.49 prior to completion of fluid bolus 500ml    Plan  Vanc/merrem  ID consult  F/u blood cultures (2/4 w/ GPCs)  UC w/ GNRs; awaiting sensitivities  Daily cbc  Contact precautions        Atelectasis  I have personally reviewed Chest X-ray. Patient with atelectasis on interpretation. Likely Non-Obstructive atelectasis secondary to immobility. Signs and symptoms include Hypoxemia Will begin treatment with upright positioning.    Hypermagnesemia  Present on arrival in the setting of ESRD    -daily  cmp, mg, phos  -nephro consulted for dialysis      Prolonged QT interval  Qtc 526, limit Qtc prolonging drugs as able      Spastic hemiplegia of right dominant side as late effect of cerebrovascular disease   This patient has Chronic right hemiplegia due to stroke. Physical therapy services has not been scheduled. Continue all standard measures for pressure injury prevention and consult wound care for any wounds (chronic or acute).    ESRD (end stage renal disease) on dialysis  Creatine stable for now. BMP reviewed- noted Estimated Creatinine Clearance: 11.3 mL/min (A) (based on SCr of 7.8 mg/dL (H)). according to latest data. Based on current GFR, CKD stage is end stage.  Monitor UOP and serial BMP and adjust therapy as needed. Renally dose meds. Avoid nephrotoxic medications and procedures.    Pt MWF HD, underwent on 4/10, complete session  Plan  Nephrology consult  Careful with IV fluid  Monitor fluid status    Acute encephalopathy  Pt is non-verbal and does not follow commands at baseline, see AMS      Type 2 diabetes mellitus with hyperglycemia, with long-term current use of insulin  Pt currently taking detemir 25 BID, and moderate sliding scale aspart. Glucose elevated in the 300s on arrival. Last A1c 10    Plan  Aspart 3u q4h  MDSSI  POCT glucose q4h, please give remaining sliding scale requirement if above the scheduled 3u  Ordered current regimen with elevation in glucose      Ros's gangrene  Pt experienced severe episode of ros's gangrene in January of 2024. Pt underwent extensive course, currently still healing from this, but no longer has wound vac. Pt's wound currently covered with bandage. Picture in media tabs    Plan  Wound care        VTE Risk Mitigation (From admission, onward)           Ordered     heparin (porcine) injection 1,000 Units  As needed (PRN)         04/12/24 0951     heparin (porcine) injection 7,500 Units  Every 8 hours         04/11/24 0252                    Discharge  Planning   ZEKE: 4/14/2024     Code Status: Full Code   Is the patient medically ready for discharge?: No    Reason for patient still in hospital (select all that apply): Patient trending condition, Treatment, Consult recommendations, and Pending disposition                     Adi Aguirre MD  Department of Hospital Medicine   [facility] - Telemetry Stepdown

## 2024-04-12 NOTE — ASSESSMENT & PLAN NOTE
Present on arrival in the setting of ESRD    -daily cmp, mg, phos  -nephro consulted for dialysis

## 2024-04-12 NOTE — SUBJECTIVE & OBJECTIVE
Interval History: NAEON. Patient underwent dialysis today, but was unable to tolerate volume removal due to hypotension. Received 500cc bolus and underwent remaining dialysis session without volume filtration. Patient vitally stable and volume stats appears unchanged; will monitor for hypoxia/hypervolemia. Otherwise, added vancomycin today due to growth of GPCs in 2/4 bottles; ID following.    Review of Systems   Reason unable to perform ROS: nonverbal.     Objective:     Vital Signs (Most Recent):  Temp: 99 °F (37.2 °C) (04/12/24 1541)  Pulse: 107 (04/12/24 1541)  Resp: 20 (04/12/24 1541)  BP: 110/70 (04/12/24 1541)  SpO2: 99 % (04/12/24 1541) Vital Signs (24h Range):  Temp:  [97.7 °F (36.5 °C)-99 °F (37.2 °C)] 99 °F (37.2 °C)  Pulse:  [] 107  Resp:  [18-21] 20  SpO2:  [98 %-100 %] 99 %  BP: ()/(64-86) 110/70     Weight: 122.6 kg (270 lb 4.5 oz)  Body mass index is 42.33 kg/m².    Intake/Output Summary (Last 24 hours) at 4/12/2024 1646  Last data filed at 4/12/2024 1430  Gross per 24 hour   Intake 2450.91 ml   Output 384 ml   Net 2066.91 ml         Physical Exam  Constitutional:       General: She is not in acute distress.     Appearance: She is ill-appearing.      Comments: Trach in place, no purulence    HENT:      Head: Normocephalic and atraumatic.   Eyes:      General: No scleral icterus.     Extraocular Movements: Extraocular movements intact.   Cardiovascular:      Rate and Rhythm: Normal rate and regular rhythm.   Pulmonary:      Effort: Pulmonary effort is normal. No respiratory distress.   Abdominal:      General: Abdomen is flat. There is no distension.      Palpations: Abdomen is soft.      Tenderness: There is no abdominal tenderness.      Comments: Peg tube in place, no purulence noted   Genitourinary:     Comments: Has large bandage over groin area. Pictures in chart  Musculoskeletal:      Right lower leg: No edema.      Left lower leg: No edema.      Comments: Moves BUE spontaneously    Skin:     General: Skin is warm and dry.      Findings: Wound present.   Neurological:      Mental Status: She is alert.      Comments: Nonverbal   Psychiatric:      Comments: Nonverbal             Significant Labs: All pertinent labs within the past 24 hours have been reviewed.  Blood Culture:   Recent Labs   Lab 04/10/24  2243   LABBLOO Gram stain erasmo bottle: Gram positive cocci in clusters resembling Staph  Positive results previously called 04/12/2024  Gram stain aer bottle: Gram positive cocci in clusters resembling Staph  Gram stain erasmo bottle: Gram positive cocci in clusters resembling Staph  Results called to and read back by: Radha Starks 04/12/2024  05:06     CBC:   Recent Labs   Lab 04/10/24  2243 04/11/24  1342 04/12/24  0618   WBC 17.15* 16.30* 13.55*   HGB 11.4* 10.4* 10.8*   HCT 36.1* 33.4* 34.9*    265 265     CMP:   Recent Labs   Lab 04/11/24  0101 04/12/24  0618   * 133*   K 4.7 4.5   CL 95 94*   CO2 22* 20*   * 279*   BUN 72* 83*   CREATININE 5.8* 7.8*   CALCIUM 10.7* 10.8*   PROT 9.7* 9.3*   ALBUMIN 3.4* 3.4*   BILITOT 0.3 0.3   ALKPHOS 137* 109   AST 25 22   ALT 33 33   ANIONGAP 18* 19*       Significant Imaging: I have reviewed all pertinent imaging results/findings within the past 24 hours.

## 2024-04-12 NOTE — PROGRESS NOTES
Dialysis started to [facility].  Tolerated well.    Contact isolation precautions maintained.

## 2024-04-12 NOTE — ASSESSMENT & PLAN NOTE
Pt presenting with AMS and worsening lethargy. Pt is non-verbal at baseline, does not follow commands, but was less responsive than normal. Pt found to have a fever. Urinary source suspected based off of urine and CT abd/pelvis. Pt has many prior resistant bacterial infections including urinary sources. Has prior with sensitivity to zosyn and has also improved off ED dose of vanc/zosyn. Lactic elevated a 3.8->3.49 prior to completion of fluid bolus 500ml    Plan  Vanc/merrem  ID consult  F/u blood cultures (2/4 w/ GPCs)  UC w/ GNRs; awaiting sensitivities  Daily cbc  Contact precautions

## 2024-04-12 NOTE — ASSESSMENT & PLAN NOTE
I have personally reviewed Chest X-ray. Patient with atelectasis on interpretation. Likely Non-Obstructive atelectasis secondary to immobility. Signs and symptoms include Hypoxemia Will begin treatment with upright positioning.

## 2024-04-12 NOTE — ASSESSMENT & PLAN NOTE
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 Prior cultures w/ proteus mirabilis and bacteroides.  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 she completed a course of meropenem for SSTI of right groin wound on 3/21/24 as repeat wound cultures +pseudomonas (cefepime, zosyn resistant). She is admitted from [facility] for AMS and fevers.     CT abdomen/pelvis concerning for cystitis as well as a UA (obtained via straight cath) concerning for a UTI. Blood cultures +GPCs, staph panel and mrsa panel negative. Suspect likely contaminants. CXR negative for focal consolidations.       Recommendations  Continue meropenem  restart vancomycin as with gpcs in blood culture, may likely be contaminants. Will f/u closely. Agree with repeat blood cultures  Will follow urine cultures and tailor abx accordingly  Continue local wound care    Discussed with ID staff. ID will follow closely with you

## 2024-04-12 NOTE — RESPIRATORY THERAPY
RAPID RESPONSE RESPIRATORY THERAPY PROACTIVE NOTE           Time of visit:      Code Status: Full Code   : 1970  Bed: 8092/8092 A:   MRN: [id number]  Time spent at the bedside: < 15 min    SITUATION    Evaluated patient for: LDA Check     BACKGROUND    Patient has no past medical history on file.  Clinically Significant Surgical Hx: tracheostomy    24 Hours Vitals Range:  Temp:  [97.7 °F (36.5 °C)-98.5 °F (36.9 °C)]   Pulse:  []   Resp:  [15-21]   BP: ()/(64-97)   SpO2:  [98 %-100 %]     Labs:    Recent Labs     04/10/24  0429 24  0101 24  0618   * 135* 133*   K 5.1 4.7 4.5   CL 95 95 94*   CO2 19* 22* 20*   * 72* 83*   CREATININE 8.4* 5.8* 7.8*   * 306* 279*   PHOS 6.6*  --  5.6*   MG 3.2*  --  2.6        Recent Labs     04/10/24  2251   PH 7.441   PCO2 37.7   PO2 39*   HCO3 25.7   POCSATURATED 76   BE 2       ASSESSMENT/INTERVENTIONS  Pt on 5L 28% trach collar. Shiley size 6 uncuffed trach in place. All supplies in room. Extra trachs at bedside - 4 uncuffed and 6 cuffed.      Last VS   Temp: 98.2 °F (36.8 °C) ( 0751)  Pulse: 103 ( 1330)  Resp: 18 ( 0751)  BP: 115/74 ( 1330)  SpO2: 100 % ( 0859)      Extra trachs at bedside: 4 uncuffed and 6 cuffed.  Level of Consciousness: Level of Consciousness (AVPU): alert  Respiratory Effort: Respiratory Effort: Unlabored Expansion/Accessory Muscle Usage: Expansion/Accessory Muscles/Retractions: no use of accessory muscles  All Lung Field Breath Sounds: All Lung Fields Breath Sounds: Anterior:, Posterior:, equal bilaterally, diminished  O2 Device/Concentration: 5L 28% TC.  Surgical airway: Yes, Type: Shiley Size: 6, uncuffed  Ambu at bedside:       Active Orders   Respiratory Care    Pulse Oximetry Continuous     Frequency: Continuous     Number of Occurrences: Until Specified    Routine tracheostomy care     Frequency: BID     Number of Occurrences: Until Specified       RECOMMENDATIONS    We  recommend: RRT Recs: Continue POC per primary team.      FOLLOW-UP    Please call back the Rapid Response RT, Javier June, BOOGIE at x [phone] for any questions or concerns.

## 2024-04-12 NOTE — HOSPITAL COURSE
53 JARROD admitted to Hospital Medicine service for urosepsis. Vanc/zosyn initiated, found to have staph epi in all 4 bottles, vanc/ertapenem course completed per ID. Vascular Neurology consulted concerning mental status change with the recommendation to initiate ASA 81 QD and to obtain an MRI to r/o new stroke. Per discussion with vascular neurology, MRI findings appear to be expected changes from prior stroke. Nephrology was consulted for regularly scheduled dialysis. Pulm consulted, patient decannulated 4/30. Failed swallow assessment, continuing tube feeds. Ongoing placement difficulties d/t need for accepting HD facility to have sandee lift with 2 personnel to operate. Once patient gets accepted at [facility], next step will be to work with daughter on retirement NH placement. SW onboard working on paperwork for long term Medicaid application. H/c c/b new fever, ID reconsulted, CT chest showing bilateral ground glass opacities, Vanc/meropenem course to be completed 6/17, however patient had further episode of fever and will need continued merem and ID consulted for assistance. CT pan scan to assess for infection. Imaging without signs of infection, patient to complete empiric abx for 5 days. Patient continues fevering despite broad abx, no identified source. Chest x-ray, peripheral smear to assess for other sources. Tunneled catheter removed and replaced via interventional nephrology at recommendation of ID. Catheter tip culture + for proteus. She completed course of ertapenem 7/5. Stably low hyponatremia 124-1325 throughout admission, likely due to ESRD. Medically ready for discharge.

## 2024-04-12 NOTE — PROGRESS NOTES
3 hour dialysis complete.  Blood returned.  RIJ PC flushed, heparin locked, capped and taped.    Tx was stopped after approx 1 hour to instill cathflo.  Cathflo removed after 1 hour.  Tx restarted.    Unable to remove fluid d/t SBP in the 80's.      Net +1.066 L.    DNilda Greogry NP at the bedside and aware.    Transported from [facility] to room 8092 via bed by TRISTON Ho RN and this RN.    Primary RN notified of return.

## 2024-04-12 NOTE — ASSESSMENT & PLAN NOTE
This patient has Chronic right hemiplegia due to stroke. Physical therapy services has not been scheduled. Continue all standard measures for pressure injury prevention and consult wound care for any wounds (chronic or acute).

## 2024-04-12 NOTE — NURSING
Nurses Note -- 4 Eyes      4/12/2024   5:46 AM      Skin assessed during: Admit      [] No Altered Skin Integrity Present    []Prevention Measures Documented      [x] Yes- Altered Skin Integrity Present or Discovered   [] LDA Added if Not in Epic (Describe Wound)   [] New Altered Skin Integrity was Present on Admit and Documented in LDA   [x] Wound Image Taken    Wound Care Consulted? Yes    Attending Nurse:  ELISABETH Garcia    Second RN/Staff Member:  [place]

## 2024-04-12 NOTE — ASSESSMENT & PLAN NOTE
Creatine stable for now. BMP reviewed- noted Estimated Creatinine Clearance: 11.3 mL/min (A) (based on SCr of 7.8 mg/dL (H)). according to latest data. Based on current GFR, CKD stage is end stage.  Monitor UOP and serial BMP and adjust therapy as needed. Renally dose meds. Avoid nephrotoxic medications and procedures.    Pt MWF HD, underwent on 4/10, complete session  Plan  Nephrology consult  Careful with IV fluid  Monitor fluid status

## 2024-04-12 NOTE — PROGRESS NOTES
[facility] H[facility]ODIALYSIS NOTE     Patient currently on hemodialysis for removal of uremic toxins .     Patient seen and evaluated on hemodialysis, tolerating treatment, see HD flowsheet for vitals and assessments.      Patient remain non verbal. Unable to complete ROS.      Target UF: 0 UF, BP dropped upon initiation of HD. UFR adjusted.   Issues with CVC, only tolerating a BFR of 200 mL/her, planning for cath hedy today.   Continue Sevelamer  Hgb 10.8 - no EPO  Continue to monitor intake and output, daily weights   Please avoid gadolinium, fleets, phos-based laxatives, NSAIDs  Will follow closely and continue dialysis treatments while in-patient    SHERLY Gregory DNP, APRN, FNP-C  Department of Nephrology  [facility]  Pager: [phone]

## 2024-04-12 NOTE — ASSESSMENT & PLAN NOTE
Pt currently taking detemir 25 BID, and moderate sliding scale aspart. Glucose elevated in the 300s on arrival. Last A1c 10    Plan  Aspart 3u q4h  MDSSI  POCT glucose q4h, please give remaining sliding scale requirement if above the scheduled 3u  Ordered current regimen with elevation in glucose

## 2024-04-12 NOTE — PROGRESS NOTES
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 Prior cultures w/ proteus mirabilis and bacteroides.  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 she completed a course of meropenem for SSTI of right groin wound on 3/21/24 as repeat wound cultures +pseudomonas (cefepime, zosyn resistant). She is admitted from [facility] [place] for AMS and fevers.     CT abdomen/pelvis concerning for cystitis as well as a UA (obtained via straight cath) concerning for a UTI. Blood cultures +GPCs, staph panel and mrsa panel negative. Suspect likely contaminants. CXR negative for focal consolidations.       Recommendations  Continue meropenem  restart vancomycin as with gpcs in blood culture, may likely be contaminants. Will f/u closely. Agree with repeat blood cultures  Will follow urine cultures and tailor abx accordingly  Continue local wound care    Discussed with ID staff. ID will follow closely with you           Thank you for your consult. I will follow-up with patient. Please contact us if you have any additional questions.    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 Reportedly patient was less responsive than her baseline had an episode of emesis, in the ED she had a CT abdomen/pelvis concerning for cystitis as well as a UA (obtained via straight cath) concerning for a UTI. She was initially started on vancomycin and Zosyn later broadened to meropenem. On presentation her labs were notable for leukocytosis of 17, as well as a lactic acidosis of 3.9 that has improved to 2.4 with the administration of 1 L of IV fluids, we are being cautious with fluid repletion given her end-stage renal disease and oliguric status. Infectious diseases consulted for her history of multiple drug-resistant organisms.       She completed meropenem on 3/21/24 for right groin wound infection. She is followed by wound care closely. Wuond appear stable without active signs of infection. Tmax 100, WBC 17K on admission. She is currently on vancomycin and meroepnem. Blood cultures NGTD. CXR negaitve for focal consolidations.   Interval History:   Blood cultures on admission +GPCs, MRSA and staph aureus panel negative  Repeat blood cultures today  Urine cultures GNR      Review of Systems   Unable to perform ROS: Patient nonverbal     Objective:     Vital Signs (Most Recent):  Temp: 98.2 °F (36.8 °C) (04/12/24 0751)  Pulse: 104 (04/12/24 1459)  Resp: 18 (04/12/24 0751)  BP: 115/74 (04/12/24 1330)  SpO2: 100 % (04/12/24 0859) Vital Signs (24h Range):  Temp:  [97.7 °F (36.5 °C)-98.5 °F (36.9 °C)] 98.2 °F (36.8 °C)  Pulse:  [] 104  Resp:  [15-21] 18  SpO2:  [98 %-100 %] 100 %  BP: ()/(64-97) 115/74     Weight: 122.6 kg (270 lb 4.5 oz)  Body mass index is 42.33 kg/m².    Estimated Creatinine Clearance: 11.3 mL/min (A) (based on SCr of 7.8 mg/dL (H)).     Physical Exam  Vitals and nursing note reviewed.   Constitutional:       Appearance: She is well-developed.      Comments: Somnolent on exam   HENT:      Head:  Normocephalic and atraumatic.   Eyes:      Pupils: Pupils are equal, round, and reactive to light.   Neck:      Comments: Trach site   Cardiovascular:      Rate and Rhythm: Normal rate and regular rhythm.      Heart sounds: Normal heart sounds.   Pulmonary:      Effort: Pulmonary effort is normal. No respiratory distress.      Breath sounds: Normal breath sounds. No wheezing, rhonchi or rales.   Abdominal:      General: Bowel sounds are normal. There is no distension.      Palpations: Abdomen is soft. There is no mass.      Tenderness: There is no abdominal tenderness.   Musculoskeletal:      Cervical back: Normal range of motion and neck supple.   Skin:     General: Skin is warm and dry.      Coloration: Skin is not pale.      Findings: No erythema.      Comments: Right groin wound c/d/I. No purulent drainage    Tunneled line catheter c/d/I        Neurological:      Mental Status: She is alert.          Significant Labs: All pertinent labs within the past 24 hours have been reviewed.    Significant Imaging: I have reviewed all pertinent imaging results/findings within the past 24 hours.

## 2024-04-12 NOTE — SUBJECTIVE & OBJECTIVE
Interval History:   Blood cultures on admission +GPCs, MRSA and staph aureus panel negative  Repeat blood cultures today  Urine cultures GNR      Review of Systems   Unable to perform ROS: Patient nonverbal     Objective:     Vital Signs (Most Recent):  Temp: 98.2 °F (36.8 °C) (04/12/24 0751)  Pulse: 104 (04/12/24 1459)  Resp: 18 (04/12/24 0751)  BP: 115/74 (04/12/24 1330)  SpO2: 100 % (04/12/24 0859) Vital Signs (24h Range):  Temp:  [97.7 °F (36.5 °C)-98.5 °F (36.9 °C)] 98.2 °F (36.8 °C)  Pulse:  [] 104  Resp:  [15-21] 18  SpO2:  [98 %-100 %] 100 %  BP: ()/(64-97) 115/74     Weight: 122.6 kg (270 lb 4.5 oz)  Body mass index is 42.33 kg/m².    Estimated Creatinine Clearance: 11.3 mL/min (A) (based on SCr of 7.8 mg/dL (H)).     Physical Exam  Vitals and nursing note reviewed.   Constitutional:       Appearance: She is well-developed.      Comments: Somnolent on exam   HENT:      Head: Normocephalic and atraumatic.   Eyes:      Pupils: Pupils are equal, round, and reactive to light.   Neck:      Comments: Trach site   Cardiovascular:      Rate and Rhythm: Normal rate and regular rhythm.      Heart sounds: Normal heart sounds.   Pulmonary:      Effort: Pulmonary effort is normal. No respiratory distress.      Breath sounds: Normal breath sounds. No wheezing, rhonchi or rales.   Abdominal:      General: Bowel sounds are normal. There is no distension.      Palpations: Abdomen is soft. There is no mass.      Tenderness: There is no abdominal tenderness.   Musculoskeletal:      Cervical back: Normal range of motion and neck supple.   Skin:     General: Skin is warm and dry.      Coloration: Skin is not pale.      Findings: No erythema.      Comments: Right groin wound c/d/I. No purulent drainage    Tunneled line catheter c/d/I        Neurological:      Mental Status: She is alert.          Significant Labs: All pertinent labs within the past 24 hours have been reviewed.    Significant Imaging: I have reviewed  all pertinent imaging results/findings within the past 24 hours.

## 2024-04-13 LAB
ALBUMIN SERPL BCP-MCNC: 3.1 G/DL (ref 3.5–5.2)
ALP SERPL-CCNC: 107 U/L (ref 55–135)
ALT SERPL W/O P-5'-P-CCNC: 30 U/L (ref 10–44)
ANION GAP SERPL CALC-SCNC: 15 MMOL/L (ref 8–16)
AST SERPL-CCNC: 21 U/L (ref 10–40)
BASOPHILS # BLD AUTO: 0.04 K/UL (ref 0–0.2)
BASOPHILS NFR BLD: 0.4 % (ref 0–1.9)
BILIRUB SERPL-MCNC: 0.3 MG/DL (ref 0.1–1)
BUN SERPL-MCNC: 46 MG/DL (ref 6–20)
CALCIUM SERPL-MCNC: 10 MG/DL (ref 8.7–10.5)
CHLORIDE SERPL-SCNC: 94 MMOL/L (ref 95–110)
CO2 SERPL-SCNC: 22 MMOL/L (ref 23–29)
CREAT SERPL-MCNC: 5.7 MG/DL (ref 0.5–1.4)
DIFFERENTIAL METHOD BLD: ABNORMAL
EOSINOPHIL # BLD AUTO: 0.6 K/UL (ref 0–0.5)
EOSINOPHIL NFR BLD: 5.2 % (ref 0–8)
ERYTHROCYTE [DISTWIDTH] IN BLOOD BY AUTOMATED COUNT: 15.7 % (ref 11.5–14.5)
EST. GFR  (NO RACE VARIABLE): 8.3 ML/MIN/1.73 M^2
GLUCOSE SERPL-MCNC: 190 MG/DL (ref 70–110)
HCT VFR BLD AUTO: 29.1 % (ref 37–48.5)
HGB BLD-MCNC: 8.9 G/DL (ref 12–16)
IMM GRANULOCYTES # BLD AUTO: 0.11 K/UL (ref 0–0.04)
IMM GRANULOCYTES NFR BLD AUTO: 1 % (ref 0–0.5)
LYMPHOCYTES # BLD AUTO: 2.4 K/UL (ref 1–4.8)
LYMPHOCYTES NFR BLD: 22.3 % (ref 18–48)
MAGNESIUM SERPL-MCNC: 2.1 MG/DL (ref 1.6–2.6)
MCH RBC QN AUTO: 28.3 PG (ref 27–31)
MCHC RBC AUTO-ENTMCNC: 30.6 G/DL (ref 32–36)
MCV RBC AUTO: 93 FL (ref 82–98)
MONOCYTES # BLD AUTO: 1.3 K/UL (ref 0.3–1)
MONOCYTES NFR BLD: 11.6 % (ref 4–15)
NEUTROPHILS # BLD AUTO: 6.5 K/UL (ref 1.8–7.7)
NEUTROPHILS NFR BLD: 59.5 % (ref 38–73)
NRBC BLD-RTO: 0 /100 WBC
PHOSPHATE SERPL-MCNC: 3.6 MG/DL (ref 2.7–4.5)
PLATELET # BLD AUTO: 209 K/UL (ref 150–450)
PMV BLD AUTO: 10.9 FL (ref 9.2–12.9)
POCT GLUCOSE: 158 MG/DL (ref 70–110)
POCT GLUCOSE: 185 MG/DL (ref 70–110)
POCT GLUCOSE: 197 MG/DL (ref 70–110)
POCT GLUCOSE: 202 MG/DL (ref 70–110)
POCT GLUCOSE: 202 MG/DL (ref 70–110)
POCT GLUCOSE: 203 MG/DL (ref 70–110)
POCT GLUCOSE: 206 MG/DL (ref 70–110)
POCT GLUCOSE: 208 MG/DL (ref 70–110)
POCT GLUCOSE: 224 MG/DL (ref 70–110)
POTASSIUM SERPL-SCNC: 3.5 MMOL/L (ref 3.5–5.1)
PROT SERPL-MCNC: 8.7 G/DL (ref 6–8.4)
RBC # BLD AUTO: 3.14 M/UL (ref 4–5.4)
SODIUM SERPL-SCNC: 131 MMOL/L (ref 136–145)
WBC # BLD AUTO: 10.95 K/UL (ref 3.9–12.7)

## 2024-04-13 PROCEDURE — 83735 ASSAY OF MAGNESIUM: CPT

## 2024-04-13 PROCEDURE — 20600001 HC STEP DOWN PRIVATE ROOM

## 2024-04-13 PROCEDURE — 85025 COMPLETE CBC W/AUTO DIFF WBC: CPT

## 2024-04-13 PROCEDURE — 99900035 HC TECH TIME PER 15 MIN (STAT)

## 2024-04-13 PROCEDURE — 25000003 PHARM REV CODE 250

## 2024-04-13 PROCEDURE — 25000003 PHARM REV CODE 250: Performed by: STUDENT IN AN ORGANIZED HEALTH CARE EDUCATION/TRAINING PROGRAM

## 2024-04-13 PROCEDURE — 99900026 HC AIRWAY MAINTENANCE (STAT)

## 2024-04-13 PROCEDURE — 87040 BLOOD CULTURE FOR BACTERIA: CPT | Performed by: PHYSICIAN ASSISTANT

## 2024-04-13 PROCEDURE — 94760 N-INVAS EAR/PLS OXIMETRY 1: CPT

## 2024-04-13 PROCEDURE — 80053 COMPREHEN METABOLIC PANEL: CPT

## 2024-04-13 PROCEDURE — 94761 N-INVAS EAR/PLS OXIMETRY MLT: CPT

## 2024-04-13 PROCEDURE — 84100 ASSAY OF PHOSPHORUS: CPT

## 2024-04-13 PROCEDURE — 27000221 HC OXYGEN, UP TO 24 HOURS

## 2024-04-13 PROCEDURE — 63600175 PHARM REV CODE 636 W HCPCS: Performed by: STUDENT IN AN ORGANIZED HEALTH CARE EDUCATION/TRAINING PROGRAM

## 2024-04-13 PROCEDURE — 25000242 PHARM REV CODE 250 ALT 637 W/ HCPCS

## 2024-04-13 PROCEDURE — 27000207 HC ISOLATION

## 2024-04-13 PROCEDURE — 36415 COLL VENOUS BLD VENIPUNCTURE: CPT

## 2024-04-13 PROCEDURE — 63600175 PHARM REV CODE 636 W HCPCS

## 2024-04-13 PROCEDURE — 27200966 HC CLOSED SUCTION SYSTEM

## 2024-04-13 PROCEDURE — 36415 COLL VENOUS BLD VENIPUNCTURE: CPT | Performed by: PHYSICIAN ASSISTANT

## 2024-04-13 RX ADMIN — INSULIN ASPART 2 UNITS: 100 INJECTION, SOLUTION INTRAVENOUS; SUBCUTANEOUS at 11:04

## 2024-04-13 RX ADMIN — Medication 500 MG: at 09:04

## 2024-04-13 RX ADMIN — MEROPENEM 500 MG: 1 INJECTION INTRAVENOUS at 09:04

## 2024-04-13 RX ADMIN — SEVELAMER CARBONATE 2.4 G: 2400 POWDER, FOR SUSPENSION ORAL at 09:04

## 2024-04-13 RX ADMIN — ASPIRIN 81 MG CHEWABLE TABLET 81 MG: 81 TABLET CHEWABLE at 09:04

## 2024-04-13 RX ADMIN — INSULIN ASPART 4 UNITS: 100 INJECTION, SOLUTION INTRAVENOUS; SUBCUTANEOUS at 09:04

## 2024-04-13 RX ADMIN — PANTOPRAZOLE SODIUM 40 MG: 40 GRANULE, DELAYED RELEASE ORAL at 09:04

## 2024-04-13 RX ADMIN — ZINC SULFATE 220 MG (50 MG) CAPSULE 220 MG: CAPSULE at 09:04

## 2024-04-13 RX ADMIN — PSYLLIUM HUSK 1 PACKET: 3.4 POWDER ORAL at 09:04

## 2024-04-13 RX ADMIN — INSULIN ASPART 4 UNITS: 100 INJECTION, SOLUTION INTRAVENOUS; SUBCUTANEOUS at 01:04

## 2024-04-13 RX ADMIN — HEPARIN SODIUM 7500 UNITS: 5000 INJECTION INTRAVENOUS; SUBCUTANEOUS at 05:04

## 2024-04-13 RX ADMIN — HEPARIN SODIUM 7500 UNITS: 5000 INJECTION INTRAVENOUS; SUBCUTANEOUS at 09:04

## 2024-04-13 RX ADMIN — HEPARIN SODIUM 7500 UNITS: 5000 INJECTION INTRAVENOUS; SUBCUTANEOUS at 02:04

## 2024-04-13 RX ADMIN — MEROPENEM 500 MG: 1 INJECTION INTRAVENOUS at 10:04

## 2024-04-13 RX ADMIN — INSULIN ASPART 3 UNITS: 100 INJECTION, SOLUTION INTRAVENOUS; SUBCUTANEOUS at 05:04

## 2024-04-13 RX ADMIN — INSULIN ASPART 2 UNITS: 100 INJECTION, SOLUTION INTRAVENOUS; SUBCUTANEOUS at 05:04

## 2024-04-13 RX ADMIN — INSULIN DETEMIR 2 UNITS: 100 INJECTION, SOLUTION SUBCUTANEOUS at 09:04

## 2024-04-13 RX ADMIN — INSULIN ASPART 2 UNITS: 100 INJECTION, SOLUTION INTRAVENOUS; SUBCUTANEOUS at 12:04

## 2024-04-13 RX ADMIN — INSULIN DETEMIR 27 UNITS: 100 INJECTION, SOLUTION SUBCUTANEOUS at 09:04

## 2024-04-13 RX ADMIN — INSULIN ASPART 1 UNITS: 100 INJECTION, SOLUTION INTRAVENOUS; SUBCUTANEOUS at 08:04

## 2024-04-13 RX ADMIN — INSULIN ASPART 3 UNITS: 100 INJECTION, SOLUTION INTRAVENOUS; SUBCUTANEOUS at 01:04

## 2024-04-13 RX ADMIN — INSULIN DETEMIR 25 UNITS: 100 INJECTION, SOLUTION SUBCUTANEOUS at 09:04

## 2024-04-13 RX ADMIN — INSULIN ASPART 3 UNITS: 100 INJECTION, SOLUTION INTRAVENOUS; SUBCUTANEOUS at 09:04

## 2024-04-13 RX ADMIN — SEVELAMER CARBONATE 2.4 G: 2400 POWDER, FOR SUSPENSION ORAL at 02:04

## 2024-04-13 NOTE — ASSESSMENT & PLAN NOTE
Pt is non-verbal and does not follow commands at baseline. Vascular Neurology consulted given    Acute change from baseline.    Plan  - Pending MRI

## 2024-04-13 NOTE — PROGRESS NOTES
[facility] Telemetry [facility] Medicine  Progress Note    Patient Name: Sarah Saravia  MRN: [id number]  Patient Class: IP- Inpatient   Admission Date: 4/10/2024  Length of Stay: 2 days  Attending Physician: Hola Liriano MD  Primary Care Provider: Deanna, Primary Doctor        Subjective:     Principal Problem:Acute cystitis with hematuria        HPI:  53 yof with 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 History was given from patient's daughter. She was undergoing dialysis today and noticed she was lethargic, less alert than usual self. Pt completed dialysis and still not acting herself. EMS was called, fever of a 100.  On chart review, she did have an episode of large volume emesis around 1700.  Per EMS, she had a slightly elevated temp 100.0°F, glucose 300s.     In the ED: UA 2+ leuks, >100 WBC, many bacteria, WBC 17, CT abd/pelvis concerning for cystitis. Given vanc/zosyn    Overview/Hospital Course:  Patient was admitted to Hospital Medicine service for medical management and evaluation of urosepsis. Patient was continued on vanc/zosyn. Vascular Neurology consulted concerning mental status change with the recommendation to initiate ASA 81 QD and to obtain an MRI to r/o new stroke. Imaging pending. Nephrology was consulted for regularly scheduled dialysis. Afternoon of 4/12, patient was unable to tolerate filtration of volume during dialsis 2/2 hypotension. Patient received 500cc bolus and was transferred back to floor after finishing the session without volume removed. Will monitor for signs of volume overload. Tailoring insulin regimen while uptitrating tube feeds to goal rate.    Interval History: NAEON. Patient remains afebrile, tachycardic, but otherwise hemodynamically stable. Remains saturating well on trach collar and still appears euvolemic. At baseline mentation per family. Monitoring blood  glucose as tube feeds approach goal rate; tolerating well. Pending MRI and speciation of urine culture. Blood cultures still with GPCs; on Vanc.    Review of Systems   Reason unable to perform ROS: nonverbal.     Objective:     Vital Signs (Most Recent):  Temp: 98.4 °F (36.9 °C) (04/13/24 1623)  Pulse: 107 (04/13/24 1623)  Resp: 19 (04/13/24 1623)  BP: (!) 142/87 (04/13/24 1623)  SpO2: (!) 94 % (04/13/24 1623) Vital Signs (24h Range):  Temp:  [98.4 °F (36.9 °C)-99.4 °F (37.4 °C)] 98.4 °F (36.9 °C)  Pulse:  [] 107  Resp:  [18-22] 19  SpO2:  [94 %-100 %] 94 %  BP: (100-142)/(68-87) 142/87     Weight: 122.6 kg (270 lb 4.5 oz)  Body mass index is 42.33 kg/m².  No intake or output data in the 24 hours ending 04/13/24 1800        Physical Exam  Constitutional:       General: She is not in acute distress.     Appearance: She is ill-appearing.      Comments: Trach in place, no purulence    HENT:      Head: Normocephalic and atraumatic.   Eyes:      General: No scleral icterus.     Extraocular Movements: Extraocular movements intact.   Cardiovascular:      Rate and Rhythm: Normal rate and regular rhythm.   Pulmonary:      Effort: Pulmonary effort is normal. No respiratory distress.   Abdominal:      General: Abdomen is flat. There is no distension.      Palpations: Abdomen is soft.      Tenderness: There is no abdominal tenderness.      Comments: Peg tube in place, no purulence noted   Genitourinary:     Comments: Has large bandage over groin area. Pictures in chart  Musculoskeletal:      Right lower leg: No edema.      Left lower leg: No edema.      Comments: Moves BUE spontaneously   Skin:     General: Skin is warm and dry.      Findings: Wound present.   Neurological:      Mental Status: She is alert.      Comments: Nonverbal   Psychiatric:      Comments: Nonverbal             Significant Labs: All pertinent labs within the past 24 hours have been reviewed.  Blood Culture:   Recent Labs   Lab 04/13/24  1012    LABBLOO No Growth to date     CBC:   Recent Labs   Lab 04/12/24  0618 04/13/24  0501   WBC 13.55* 10.95   HGB 10.8* 8.9*   HCT 34.9* 29.1*    209     CMP:   Recent Labs   Lab 04/12/24  0618 04/13/24  0501   * 131*   K 4.5 3.5   CL 94* 94*   CO2 20* 22*   * 190*   BUN 83* 46*   CREATININE 7.8* 5.7*   CALCIUM 10.8* 10.0   PROT 9.3* 8.7*   ALBUMIN 3.4* 3.1*   BILITOT 0.3 0.3   ALKPHOS 109 107   AST 22 21   ALT 33 30   ANIONGAP 19* 15       Significant Imaging: I have reviewed all pertinent imaging results/findings within the past 24 hours.    Assessment/Plan:      * Acute cystitis with hematuria  Pt presenting with AMS and worsening lethargy. Pt is non-verbal at baseline, does not follow commands, but was less responsive than normal. Pt found to have a fever. Urinary source suspected based off of urine and CT abd/pelvis. Pt has many prior resistant bacterial infections including urinary sources. Has prior with sensitivity to zosyn and has also improved off ED dose of vanc/zosyn. Lactic elevated a 3.8->3.49 prior to completion of fluid bolus 500ml    Plan  Vanc/merrem  ID consult  F/u blood cultures (4/4 w/ GPCs)  UC w/ GNRs; awaiting sensitivities  Daily cbc  Contact precautions        Atelectasis  I have personally reviewed Chest X-ray. Patient with atelectasis on interpretation. Likely Non-Obstructive atelectasis secondary to immobility. Signs and symptoms include Hypoxemia Will begin treatment with upright positioning.    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 Physical therapy services has not been scheduled. Continue all standard measures for pressure injury prevention and consult wound care for any  wounds (chronic or acute).    ESRD (end stage renal disease) on dialysis  Creatine stable for now. BMP reviewed- noted Estimated Creatinine Clearance: 15.5 mL/min (A) (based on SCr of 5.7 mg/dL (H)). according to latest data. Based on current GFR, CKD stage is end stage.  Monitor UOP and serial BMP and adjust therapy as needed. Renally dose meds. Avoid nephrotoxic medications and procedures.    Pt MWF HD, underwent on 4/10, complete session  Plan  Nephrology consult  Monitor fluid status    Acute encephalopathy  Pt is non-verbal and does not follow commands at baseline. Vascular Neurology consulted given    Acute change from baseline.    Plan  - Pending MRI    Type 2 diabetes mellitus with hyperglycemia, with long-term current use of insulin  Pt currently taking detemir 25 BID, and moderate sliding scale aspart. Glucose elevated in the 300s on arrival. 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 Pt underwent extensive course, currently still healing from this, but no longer has wound vac. Pt's wound currently covered with bandage. Picture in media tabs    Plan  Wound care        VTE Risk Mitigation (From admission, onward)           Ordered     heparin (porcine) injection 1,000 Units  As needed (PRN)         04/12/24 0951     heparin (porcine) injection 7,500 Units  Every 8 hours         04/11/24 0252                    Discharge Planning   ZEKE: 4/14/2024     Code Status: Full Code   Is the patient medically ready for discharge?: No    Reason for patient still in hospital (select all that apply): Patient trending condition, Treatment, Consult recommendations, and Pending disposition                     Adi Aguirre MD  Department of Hospital Medicine   [facility]  Stepdown

## 2024-04-13 NOTE — PLAN OF CARE
Problem: Infection  Goal: Absence of Infection Signs and Symptoms  Outcome: Ongoing, Progressing  Intervention: Prevent or Manage Infection  Flowsheets (Taken 4/13/2024 0305)  Fever Reduction/Comfort Measures:   lightweight bedding   lightweight clothing  Infection Management: aseptic technique maintained  Isolation Precautions:   precautions maintained   contact     Problem: Skin Injury Risk Increased  Goal: Skin Health and Integrity  Outcome: Ongoing, Progressing  Intervention: Optimize Skin Protection  Flowsheets (Taken 4/13/2024 0305)  Pressure Reduction Techniques: weight shift assistance provided  Pressure Reduction Devices: heel offloading device utilized  Skin Protection:   adhesive use limited   tubing/devices free from skin contact  Head of Bed (HOB) Positioning: HOB at 30 degrees  Intervention: Promote and Optimize Oral Intake  Flowsheets (Taken 4/13/2024 0305)  Oral Nutrition Promotion: (pt is on continous feeds) other (see comments)     Pt is awake but nonverbal. Unable to assess orientation. Pt sometimes tracks with eyes.  Pt does not follow commands.  Feeds at 30 ml/hr at this time.  Pt tolerating well.   Pt's boyfriend is at bedside.   Plan of care was discussed with pt and daughter early in shift.  No signs of distress noted.

## 2024-04-13 NOTE — RESPIRATORY THERAPY
[facility] RESPIRATORY THERAPY PROACTIVE NOTE           Time of visit: 834     Code Status: Full Code   : 1970  Bed: 8092/8092 A:   MRN: [id number]  Time spent at the bedside: < 15 min    SITUATION    Evaluated patient for: LDA Check     BACKGROUND    Patient has no past medical history on file.  Clinically Significant Surgical Hx: tracheostomy    24 Hours Vitals Range:  Temp:  [98.5 °F (36.9 °C)-99.4 °F (37.4 °C)]   Pulse:  []   Resp:  [18-20]   BP: (100-117)/(68-77)   SpO2:  [97 %-100 %]     Labs:    Recent Labs     24  0101 24  0618 24  0501   * 133* 131*   K 4.7 4.5 3.5   CL 95 94* 94*   CO2 22* 20* 22*   BUN 72* 83* 46*   CREATININE 5.8* 7.8* 5.7*   * 279* 190*   PHOS  --  5.6* 3.6   MG  --  2.6 2.1        Recent Labs     04/10/24  2251   PH 7.441   PCO2 37.7   PO2 39*   HCO3 25.7   POCSATURATED 76   BE 2       ASSESSMENT/INTERVENTIONS  Pt resting comfortably in bed with no respiratory interventions required at this time.      Last VS   Temp: 99.4 °F (37.4 °C) (1136)  Pulse: 103 (1136)  Resp: 20 (1136)  BP: 105/72 (1136)  SpO2: 99 % (1136)      Extra trachs at bedside: y  Level of Consciousness: Level of Consciousness (AVPU): alert  Respiratory Effort: Respiratory Effort: Unlabored Expansion/Accessory Muscle Usage: Expansion/Accessory Muscles/Retractions: expansion symmetric, no retractions, no use of accessory muscles  All Lung Field Breath Sounds: All Lung Fields Breath Sounds: Anterior:, Lateral:, diminished  JOSE Breath Sounds: diminished  O2 Device/Concentration: 5/21% T.C.  Surgical airway: Yes, Type: Shiley Size: 6, uncuffed  Ambu at bedside:       Active Orders   Respiratory Care    Oxygen Continuous     Frequency: Continuous     Number of Occurrences: Until Specified     Order Questions:      Device type: Low flow      Device: Trach Collar      FiO2%: 28%      Titrate O2 per Oxygen Titration Protocol: Yes      To maintain SpO2  goal of: >= 90%      Notify MD of: Inability to achieve desired SpO2; Sudden change in patient status and requires 20% increase in FiO2; Patient requires >60% FiO2    Pulse Oximetry Continuous     Frequency: Continuous     Number of Occurrences: Until Specified    Routine tracheostomy care     Frequency: BID     Number of Occurrences: Until Specified       RECOMMENDATIONS    We recommend: RRT Recs: Continue POC per primary team.      FOLLOW-UP    Please call back the Rapid Response RT, Constantine Mckinney RRT at x [phone] for any questions or concerns.

## 2024-04-13 NOTE — CONSULTS
[facility] Tele[facility] Stepdown  Wound Care    Patient Name:  Sarah Saravia   MRN:  [id number]  Date: 4/13/2024  Diagnosis: Acute cystitis with hematuria    History:     No past medical history on file.    Social History     Socioeconomic History    Marital status: Single   Tobacco Use    Smoking status: Unknown     Social Determinants of Health     Financial Resource Strain: Patient Unable To Answer (3/14/2024)    Overall Financial Resource Strain (CARDIA)     Difficulty of Paying Living Expenses: Patient unable to answer   Recent Concern: Financial Resource Strain - High Risk (3/9/2024)    Overall Financial Resource Strain (CARDIA)     Difficulty of Paying Living Expenses: Very hard   Food Insecurity: Patient Unable To Answer (3/14/2024)    Hunger Vital Sign     Worried About Running Out of Food in the Last Year: Patient unable to answer     Ran Out of Food in the Last Year: Patient unable to answer   Transportation Needs: Patient Unable To Answer (3/14/2024)    PRAPARE - Transportation     Lack of Transportation (Medical): Patient unable to answer     Lack of Transportation (Non-Medical): Patient unable to answer   Physical Activity: Inactive (3/13/2024)    Exercise Vital Sign     Days of Exercise per Week: 0 days     Minutes of Exercise per Session: 0 min   Stress: Patient Unable To Answer (3/14/2024)    [employer] [place] [employer] - Occupational Stress Questionnaire     Feeling of Stress : Patient unable to answer   Social Connections: Socially Isolated (3/13/2024)    Social Connection and Isolation Panel [NHANES]     Frequency of Communication with Friends and Family: More than three times a week     Frequency of Social Gatherings with Friends and Family: More than three times a week     Attends Mandaeism Services: Never     Active Member of Clubs or Organizations: No     Attends Club or Organization Meetings: Never     Marital Status: Never    Housing Stability: Patient Unable To Answer  (3/14/2024)    Housing Stability Vital Sign     Unable to Pay for Housing in the Last Year: Patient unable to answer     Number of Places Lived in the Last Year: 1     Unstable Housing in the Last Year: Patient unable to answer   Recent Concern: Housing Stability - High Risk (3/9/2024)    Housing Stability Vital Sign     Unable to Pay for Housing in the Last Year: Yes     Unstable Housing in the Last Year: No       Precautions:     Allergies as of 04/10/2024    (No Known Allergies)       WOC Assessment Details/Treatment     Pt currently off unit for HD- new consult noted for groin placed by DO. Will return for assmt as requested.     04/12/24 1050   WOCN Assessment   WOCN Total Time (mins) 15   Visit Date 04/12/24   Visit Time 1050   Consult Type New   WOCN Speciality Wound   Intervention chart review  (pt off unit for HD- will return.)     Niki Jacobs BSN, RN, CWOCN    04/13/2024

## 2024-04-13 NOTE — ASSESSMENT & PLAN NOTE
Pt currently taking detemir 25 BID, and moderate sliding scale aspart. Glucose elevated in the 300s on arrival. Last A1c 10    Plan  Aspart 4u q4h  MDSSI  POCT glucose q4h, please give remaining sliding scale requirement if above the scheduled 3u  Ordered current regimen with elevation in glucose

## 2024-04-13 NOTE — PLAN OF CARE
Problem: Infection  Goal: Absence of Infection Signs and Symptoms  Outcome: Ongoing, Progressing     Problem: Skin Injury Risk Increased  Goal: Skin Health and Integrity  Outcome: Ongoing, Progressing     Problem: Adult Inpatient Plan of Care  Goal: Plan of Care Review  Outcome: Ongoing, Progressing  Goal: Patient-Specific Goal (Individualized)  Outcome: Ongoing, Progressing     Patient alert and non verbal. No acute distress noted this time. Safety measures maintained. Patient care ongoing.

## 2024-04-13 NOTE — SUBJECTIVE & OBJECTIVE
Interval History: NAEON. Patient remains afebrile, tachycardic, but otherwise hemodynamically stable. Remains saturating well on trach collar and still appears euvolemic. At baseline mentation per family. Monitoring blood glucose as tube feeds approach goal rate; tolerating well. Pending MRI and speciation of urine culture. Blood cultures still with GPCs; on Vanc.    Review of Systems   Reason unable to perform ROS: nonverbal.     Objective:     Vital Signs (Most Recent):  Temp: 98.4 °F (36.9 °C) (04/13/24 1623)  Pulse: 107 (04/13/24 1623)  Resp: 19 (04/13/24 1623)  BP: (!) 142/87 (04/13/24 1623)  SpO2: (!) 94 % (04/13/24 1623) Vital Signs (24h Range):  Temp:  [98.4 °F (36.9 °C)-99.4 °F (37.4 °C)] 98.4 °F (36.9 °C)  Pulse:  [] 107  Resp:  [18-22] 19  SpO2:  [94 %-100 %] 94 %  BP: (100-142)/(68-87) 142/87     Weight: 122.6 kg (270 lb 4.5 oz)  Body mass index is 42.33 kg/m².  No intake or output data in the 24 hours ending 04/13/24 1800        Physical Exam  Constitutional:       General: She is not in acute distress.     Appearance: She is ill-appearing.      Comments: Trach in place, no purulence    HENT:      Head: Normocephalic and atraumatic.   Eyes:      General: No scleral icterus.     Extraocular Movements: Extraocular movements intact.   Cardiovascular:      Rate and Rhythm: Normal rate and regular rhythm.   Pulmonary:      Effort: Pulmonary effort is normal. No respiratory distress.   Abdominal:      General: Abdomen is flat. There is no distension.      Palpations: Abdomen is soft.      Tenderness: There is no abdominal tenderness.      Comments: Peg tube in place, no purulence noted   Genitourinary:     Comments: Has large bandage over groin area. Pictures in chart  Musculoskeletal:      Right lower leg: No edema.      Left lower leg: No edema.      Comments: Moves BUE spontaneously   Skin:     General: Skin is warm and dry.      Findings: Wound present.   Neurological:      Mental Status: She is  alert.      Comments: Nonverbal   Psychiatric:      Comments: Nonverbal             Significant Labs: All pertinent labs within the past 24 hours have been reviewed.  Blood Culture:   Recent Labs   Lab 04/13/24  1012   LABBLOO No Growth to date     CBC:   Recent Labs   Lab 04/12/24 0618 04/13/24  0501   WBC 13.55* 10.95   HGB 10.8* 8.9*   HCT 34.9* 29.1*    209     CMP:   Recent Labs   Lab 04/12/24  0618 04/13/24  0501   * 131*   K 4.5 3.5   CL 94* 94*   CO2 20* 22*   * 190*   BUN 83* 46*   CREATININE 7.8* 5.7*   CALCIUM 10.8* 10.0   PROT 9.3* 8.7*   ALBUMIN 3.4* 3.1*   BILITOT 0.3 0.3   ALKPHOS 109 107   AST 22 21   ALT 33 30   ANIONGAP 19* 15       Significant Imaging: I have reviewed all pertinent imaging results/findings within the past 24 hours.

## 2024-04-13 NOTE — ASSESSMENT & PLAN NOTE
Creatine stable for now. BMP reviewed- noted Estimated Creatinine Clearance: 15.5 mL/min (A) (based on SCr of 5.7 mg/dL (H)). according to latest data. Based on current GFR, CKD stage is end stage.  Monitor UOP and serial BMP and adjust therapy as needed. Renally dose meds. Avoid nephrotoxic medications and procedures.    Pt MWF HD, underwent on 4/10, complete session  Plan  Nephrology consult  Monitor fluid status

## 2024-04-14 LAB
ALBUMIN SERPL BCP-MCNC: 3.1 G/DL (ref 3.5–5.2)
ALP SERPL-CCNC: 109 U/L (ref 55–135)
ALT SERPL W/O P-5'-P-CCNC: 26 U/L (ref 10–44)
ANION GAP SERPL CALC-SCNC: 16 MMOL/L (ref 8–16)
AST SERPL-CCNC: 20 U/L (ref 10–40)
BASOPHILS # BLD AUTO: 0.06 K/UL (ref 0–0.2)
BASOPHILS NFR BLD: 0.5 % (ref 0–1.9)
BILIRUB SERPL-MCNC: 0.3 MG/DL (ref 0.1–1)
BUN SERPL-MCNC: 62 MG/DL (ref 6–20)
CALCIUM SERPL-MCNC: 10.4 MG/DL (ref 8.7–10.5)
CHLORIDE SERPL-SCNC: 94 MMOL/L (ref 95–110)
CO2 SERPL-SCNC: 22 MMOL/L (ref 23–29)
CREAT SERPL-MCNC: 6.6 MG/DL (ref 0.5–1.4)
DIFFERENTIAL METHOD BLD: ABNORMAL
EOSINOPHIL # BLD AUTO: 0.7 K/UL (ref 0–0.5)
EOSINOPHIL NFR BLD: 6 % (ref 0–8)
ERYTHROCYTE [DISTWIDTH] IN BLOOD BY AUTOMATED COUNT: 15.9 % (ref 11.5–14.5)
EST. GFR  (NO RACE VARIABLE): 7 ML/MIN/1.73 M^2
GLUCOSE SERPL-MCNC: 156 MG/DL (ref 70–110)
HCT VFR BLD AUTO: 30.6 % (ref 37–48.5)
HGB BLD-MCNC: 9.5 G/DL (ref 12–16)
IMM GRANULOCYTES # BLD AUTO: 0.12 K/UL (ref 0–0.04)
IMM GRANULOCYTES NFR BLD AUTO: 1.1 % (ref 0–0.5)
LYMPHOCYTES # BLD AUTO: 2.6 K/UL (ref 1–4.8)
LYMPHOCYTES NFR BLD: 23.2 % (ref 18–48)
MAGNESIUM SERPL-MCNC: 2.3 MG/DL (ref 1.6–2.6)
MCH RBC QN AUTO: 27.9 PG (ref 27–31)
MCHC RBC AUTO-ENTMCNC: 31 G/DL (ref 32–36)
MCV RBC AUTO: 90 FL (ref 82–98)
MONOCYTES # BLD AUTO: 1.1 K/UL (ref 0.3–1)
MONOCYTES NFR BLD: 9.4 % (ref 4–15)
NEUTROPHILS # BLD AUTO: 6.7 K/UL (ref 1.8–7.7)
NEUTROPHILS NFR BLD: 59.8 % (ref 38–73)
NRBC BLD-RTO: 0 /100 WBC
PHOSPHATE SERPL-MCNC: 3.7 MG/DL (ref 2.7–4.5)
PLATELET # BLD AUTO: 214 K/UL (ref 150–450)
PMV BLD AUTO: 10.7 FL (ref 9.2–12.9)
POCT GLUCOSE: 123 MG/DL (ref 70–110)
POCT GLUCOSE: 132 MG/DL (ref 70–110)
POCT GLUCOSE: 148 MG/DL (ref 70–110)
POCT GLUCOSE: 165 MG/DL (ref 70–110)
POCT GLUCOSE: 166 MG/DL (ref 70–110)
POCT GLUCOSE: 182 MG/DL (ref 70–110)
POCT GLUCOSE: 193 MG/DL (ref 70–110)
POTASSIUM SERPL-SCNC: 3.5 MMOL/L (ref 3.5–5.1)
PROT SERPL-MCNC: 8.4 G/DL (ref 6–8.4)
RBC # BLD AUTO: 3.41 M/UL (ref 4–5.4)
SODIUM SERPL-SCNC: 132 MMOL/L (ref 136–145)
WBC # BLD AUTO: 11.24 K/UL (ref 3.9–12.7)

## 2024-04-14 PROCEDURE — 80053 COMPREHEN METABOLIC PANEL: CPT

## 2024-04-14 PROCEDURE — 27000221 HC OXYGEN, UP TO 24 HOURS

## 2024-04-14 PROCEDURE — 83735 ASSAY OF MAGNESIUM: CPT

## 2024-04-14 PROCEDURE — 63600175 PHARM REV CODE 636 W HCPCS: Performed by: STUDENT IN AN ORGANIZED HEALTH CARE EDUCATION/TRAINING PROGRAM

## 2024-04-14 PROCEDURE — 25000242 PHARM REV CODE 250 ALT 637 W/ HCPCS

## 2024-04-14 PROCEDURE — 94761 N-INVAS EAR/PLS OXIMETRY MLT: CPT

## 2024-04-14 PROCEDURE — 27000207 HC ISOLATION

## 2024-04-14 PROCEDURE — 99900026 HC AIRWAY MAINTENANCE (STAT)

## 2024-04-14 PROCEDURE — 20600001 HC STEP DOWN PRIVATE ROOM

## 2024-04-14 PROCEDURE — 84100 ASSAY OF PHOSPHORUS: CPT

## 2024-04-14 PROCEDURE — 85025 COMPLETE CBC W/AUTO DIFF WBC: CPT

## 2024-04-14 PROCEDURE — 36415 COLL VENOUS BLD VENIPUNCTURE: CPT

## 2024-04-14 PROCEDURE — 25000003 PHARM REV CODE 250

## 2024-04-14 PROCEDURE — 99900035 HC TECH TIME PER 15 MIN (STAT)

## 2024-04-14 PROCEDURE — 25000003 PHARM REV CODE 250: Performed by: STUDENT IN AN ORGANIZED HEALTH CARE EDUCATION/TRAINING PROGRAM

## 2024-04-14 PROCEDURE — 63600175 PHARM REV CODE 636 W HCPCS

## 2024-04-14 RX ORDER — MEROPENEM AND SODIUM CHLORIDE 500 MG/50ML
500 INJECTION, SOLUTION INTRAVENOUS DAILY
Status: DISCONTINUED | OUTPATIENT
Start: 2024-04-14 | End: 2024-04-15

## 2024-04-14 RX ADMIN — MEROPENEM AND SODIUM CHLORIDE 500 MG: 500 INJECTION, SOLUTION INTRAVENOUS at 11:04

## 2024-04-14 RX ADMIN — INSULIN ASPART 3 UNITS: 100 INJECTION, SOLUTION INTRAVENOUS; SUBCUTANEOUS at 02:04

## 2024-04-14 RX ADMIN — INSULIN ASPART 2 UNITS: 100 INJECTION, SOLUTION INTRAVENOUS; SUBCUTANEOUS at 09:04

## 2024-04-14 RX ADMIN — ASPIRIN 81 MG CHEWABLE TABLET 81 MG: 81 TABLET CHEWABLE at 08:04

## 2024-04-14 RX ADMIN — SEVELAMER CARBONATE 2.4 G: 2400 POWDER, FOR SUSPENSION ORAL at 02:04

## 2024-04-14 RX ADMIN — SEVELAMER CARBONATE 2.4 G: 2400 POWDER, FOR SUSPENSION ORAL at 09:04

## 2024-04-14 RX ADMIN — HEPARIN SODIUM 7500 UNITS: 5000 INJECTION INTRAVENOUS; SUBCUTANEOUS at 09:04

## 2024-04-14 RX ADMIN — HEPARIN SODIUM 7500 UNITS: 5000 INJECTION INTRAVENOUS; SUBCUTANEOUS at 05:04

## 2024-04-14 RX ADMIN — INSULIN ASPART 3 UNITS: 100 INJECTION, SOLUTION INTRAVENOUS; SUBCUTANEOUS at 01:04

## 2024-04-14 RX ADMIN — INSULIN ASPART 3 UNITS: 100 INJECTION, SOLUTION INTRAVENOUS; SUBCUTANEOUS at 05:04

## 2024-04-14 RX ADMIN — Medication 500 MG: at 08:04

## 2024-04-14 RX ADMIN — HEPARIN SODIUM 7500 UNITS: 5000 INJECTION INTRAVENOUS; SUBCUTANEOUS at 01:04

## 2024-04-14 RX ADMIN — PSYLLIUM HUSK 1 PACKET: 3.4 POWDER ORAL at 08:04

## 2024-04-14 RX ADMIN — INSULIN ASPART 2 UNITS: 100 INJECTION, SOLUTION INTRAVENOUS; SUBCUTANEOUS at 01:04

## 2024-04-14 RX ADMIN — INSULIN DETEMIR 27 UNITS: 100 INJECTION, SOLUTION SUBCUTANEOUS at 09:04

## 2024-04-14 RX ADMIN — INSULIN ASPART 3 UNITS: 100 INJECTION, SOLUTION INTRAVENOUS; SUBCUTANEOUS at 09:04

## 2024-04-14 RX ADMIN — SEVELAMER CARBONATE 2.4 G: 2400 POWDER, FOR SUSPENSION ORAL at 08:04

## 2024-04-14 RX ADMIN — Medication 500 MG: at 09:04

## 2024-04-14 RX ADMIN — ZINC SULFATE 220 MG (50 MG) CAPSULE 220 MG: CAPSULE at 08:04

## 2024-04-14 RX ADMIN — PANTOPRAZOLE SODIUM 40 MG: 40 GRANULE, DELAYED RELEASE ORAL at 08:04

## 2024-04-14 RX ADMIN — MEROPENEM 500 MG: 1 INJECTION INTRAVENOUS at 08:04

## 2024-04-14 NOTE — PROGRESS NOTES
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 History was given from patient's daughter. She was undergoing dialysis today and noticed she was lethargic, less alert than usual self. Pt completed dialysis and still not acting herself. EMS was called, fever of a 100.  On chart review, she did have an episode of large volume emesis around 1700.  Per EMS, she had a slightly elevated temp 100.0°F, glucose 300s.     In the ED: UA 2+ leuks, >100 WBC, many bacteria, WBC 17, CT abd/pelvis concerning for cystitis. Given vanc/zosyn    Overview/Hospital Course:  Patient was admitted to Hospital Medicine service for medical management and evaluation of urosepsis. Patient was continued on vanc/zosyn. Vascular Neurology consulted concerning mental status change with the recommendation to initiate ASA 81 QD and to obtain an MRI to r/o new stroke. Imaging pending. Nephrology was consulted for regularly scheduled dialysis. Afternoon of 4/12, patient was unable to tolerate filtration of volume during dialsis 2/2 hypotension. Patient received 500cc bolus and was transferred back to floor after finishing the session without volume removed. Will monitor for signs of volume overload. Tailoring insulin regimen while uptitrating tube feeds to goal rate. GPCs in all 4 bottles; discussing possible line holiday with ID as a possible source of infection.    Interval History: NAEON. Patient remains afebrile, tachycardic, but otherwise hemodynamically stable. Remains saturating well  on trach collar and still appears euvolemic. Still at baseline mentation per family with some attempts at communication. Blood glucose well controlled. Cultures with GPCs in all 4 bottles; discussing possible line holiday with ID. May pursue TTE. Discussed MRI findings with Vascular Neurology; likely that this represents typical evolution of prior strokes without ongoing concern for vasculitis/demyelination.    Review of Systems   Reason unable to perform ROS: nonverbal.     Objective:     Vital Signs (Most Recent):  Temp: 98.3 °F (36.8 °C) (04/14/24 0731)  Pulse: 103 (04/14/24 1054)  Resp: 18 (04/14/24 0731)  BP: 124/76 (04/14/24 0731)  SpO2: 100 % (04/14/24 1054) Vital Signs (24h Range):  Temp:  [97.6 °F (36.4 °C)-98.6 °F (37 °C)] 98.3 °F (36.8 °C)  Pulse:  [103-107] 103  Resp:  [18-22] 18  SpO2:  [94 %-100 %] 100 %  BP: (109-142)/(63-87) 124/76     Weight: 122.6 kg (270 lb 4.5 oz)  Body mass index is 42.33 kg/m².    Intake/Output Summary (Last 24 hours) at 4/14/2024 1149  Last data filed at 4/14/2024 0034  Gross per 24 hour   Intake 210 ml   Output --   Net 210 ml           Physical Exam  Constitutional:       General: She is not in acute distress.     Appearance: She is ill-appearing.      Comments: Trach in place, no purulence    HENT:      Head: Normocephalic and atraumatic.   Eyes:      General: No scleral icterus.     Extraocular Movements: Extraocular movements intact.   Cardiovascular:      Rate and Rhythm: Normal rate and regular rhythm.   Pulmonary:      Effort: Pulmonary effort is normal. No respiratory distress.   Abdominal:      General: Abdomen is flat. There is no distension.      Palpations: Abdomen is soft.      Tenderness: There is no abdominal tenderness.      Comments: Peg tube in place, no purulence noted   Genitourinary:     Comments: Has large bandage over groin area. Pictures in chart  Musculoskeletal:      Right lower leg: No edema.      Left lower leg: No edema.      Comments: Moves BUE  spontaneously   Skin:     General: Skin is warm and dry.      Findings: Wound present.   Neurological:      Mental Status: She is alert.      Comments: Nonverbal   Psychiatric:      Comments: Nonverbal             Significant Labs: All pertinent labs within the past 24 hours have been reviewed.  Blood Culture:   Recent Labs   Lab 04/13/24  1012 04/13/24  1103   LABBLOO No Growth to date No Growth to date     CBC:   Recent Labs   Lab 04/13/24  0501 04/14/24  0241   WBC 10.95 11.24   HGB 8.9* 9.5*   HCT 29.1* 30.6*    214     CMP:   Recent Labs   Lab 04/13/24  0501 04/14/24  0241   * 132*   K 3.5 3.5   CL 94* 94*   CO2 22* 22*   * 156*   BUN 46* 62*   CREATININE 5.7* 6.6*   CALCIUM 10.0 10.4   PROT 8.7* 8.4   ALBUMIN 3.1* 3.1*   BILITOT 0.3 0.3   ALKPHOS 107 109   AST 21 20   ALT 30 26   ANIONGAP 15 16       Significant Imaging: I have reviewed all pertinent imaging results/findings within the past 24 hours.    Assessment/Plan:      * Acute cystitis with hematuria  Pt presenting with AMS and worsening lethargy. Pt is non-verbal at baseline, does not follow commands, but was less responsive than normal. Pt found to have a fever. Urinary source suspected based off of urine and CT abd/pelvis. Pt has many prior resistant bacterial infections including urinary sources. Has prior with sensitivity to zosyn and has also improved off ED dose of vanc/zosyn. Lactic elevated a 3.8->3.49 prior to completion of fluid bolus 500ml    Plan  Vanc/merrem  ID consult  F/u blood cultures (4/4 w/ GPCs)  UC w/ GNRs; awaiting sensitivities  Daily cbc  Contact precautions        Atelectasis  I have personally reviewed Chest X-ray. Patient with atelectasis on interpretation. Likely Non-Obstructive atelectasis secondary to immobility. Signs and symptoms include Hypoxemia Will begin treatment with upright positioning.    Hypermagnesemia  Present on arrival in the setting of ESRD    -daily cmp, mg, phos  -nephro consulted for  dialysis      Prolonged QT interval  Qtc 526, limit Qtc prolonging drugs as able      Spastic hemiplegia of right dominant side as late effect of cerebrovascular disease   This patient has Chronic right hemiplegia due to stroke. Physical therapy services has not been scheduled. Continue all standard measures for pressure injury prevention and consult wound care for any wounds (chronic or acute).    ESRD (end stage renal disease) on dialysis  Creatine stable for now. BMP reviewed- noted Estimated Creatinine Clearance: 13.4 mL/min (A) (based on SCr of 6.6 mg/dL (H)). according to latest data. Based on current GFR, CKD stage is end stage.  Monitor UOP and serial BMP and adjust therapy as needed. Renally dose meds. Avoid nephrotoxic medications and procedures.    Pt MWF HD, underwent on 4/10, complete session  Plan  Nephrology consult  Will consider line holiday  Monitor fluid status    Acute encephalopathy  Pt is non-verbal and does not follow commands at baseline. Vascular Neurology consulted given acute change from baseline. MRI with concern for evolution of prior strokes with noted differential of T2 hyperintensities including vasculitis vs demyelination. Discussed with Vascular Neurology; low concern for ongoing vasculitis/demyelination, likely represents typical evolution of prior infarcts.    Plan  - STAT head imaging for concern of new change in mental status/focal deficit    Type 2 diabetes mellitus with hyperglycemia, with long-term current use of insulin  Pt currently taking detemir 25 BID, and moderate sliding scale aspart. Glucose elevated in the 300s on arrival. 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 Pt underwent extensive course, currently still healing from this,  but no longer has wound vac. Pt's wound currently covered with bandage. Picture in media tabs    Plan  Wound care        VTE Risk Mitigation (From admission, onward)           Ordered     heparin (porcine) injection 1,000 Units  As needed (PRN)         04/12/24 0951     heparin (porcine) injection 7,500 Units  Every 8 hours         04/11/24 0252                    Discharge Planning   ZEKE: 4/14/2024     Code Status: Full Code   Is the patient medically ready for discharge?: No    Reason for patient still in hospital (select all that apply): Patient trending condition, Treatment, Consult recommendations, and Pending disposition                     Adi Aguirre MD  Department of Hospital Medicine   [facility] - Telemetry Stepdown

## 2024-04-14 NOTE — SUBJECTIVE & OBJECTIVE
Interval History: NAEON. Patient remains afebrile, tachycardic, but otherwise hemodynamically stable. Remains saturating well on trach collar and still appears euvolemic. Still at baseline mentation per family with some attempts at communication. Blood glucose well controlled. Cultures with GPCs in all 4 bottles; discussing possible line holiday with ID. May pursue TTE. Discussed MRI findings with Vascular Neurology; likely that this represents typical evolution of prior strokes without ongoing concern for vasculitis/demyelination.    Review of Systems   Reason unable to perform ROS: nonverbal.     Objective:     Vital Signs (Most Recent):  Temp: 98.3 °F (36.8 °C) (04/14/24 0731)  Pulse: 103 (04/14/24 1054)  Resp: 18 (04/14/24 0731)  BP: 124/76 (04/14/24 0731)  SpO2: 100 % (04/14/24 1054) Vital Signs (24h Range):  Temp:  [97.6 °F (36.4 °C)-98.6 °F (37 °C)] 98.3 °F (36.8 °C)  Pulse:  [103-107] 103  Resp:  [18-22] 18  SpO2:  [94 %-100 %] 100 %  BP: (109-142)/(63-87) 124/76     Weight: 122.6 kg (270 lb 4.5 oz)  Body mass index is 42.33 kg/m².    Intake/Output Summary (Last 24 hours) at 4/14/2024 1149  Last data filed at 4/14/2024 0034  Gross per 24 hour   Intake 210 ml   Output --   Net 210 ml           Physical Exam  Constitutional:       General: She is not in acute distress.     Appearance: She is ill-appearing.      Comments: Trach in place, no purulence    HENT:      Head: Normocephalic and atraumatic.   Eyes:      General: No scleral icterus.     Extraocular Movements: Extraocular movements intact.   Cardiovascular:      Rate and Rhythm: Normal rate and regular rhythm.   Pulmonary:      Effort: Pulmonary effort is normal. No respiratory distress.   Abdominal:      General: Abdomen is flat. There is no distension.      Palpations: Abdomen is soft.      Tenderness: There is no abdominal tenderness.      Comments: Peg tube in place, no purulence noted   Genitourinary:     Comments: Has large bandage over groin area.  Pictures in chart  Musculoskeletal:      Right lower leg: No edema.      Left lower leg: No edema.      Comments: Moves BUE spontaneously   Skin:     General: Skin is warm and dry.      Findings: Wound present.   Neurological:      Mental Status: She is alert.      Comments: Nonverbal   Psychiatric:      Comments: Nonverbal             Significant Labs: All pertinent labs within the past 24 hours have been reviewed.  Blood Culture:   Recent Labs   Lab 04/13/24  1012 04/13/24  1103   LABBLOO No Growth to date No Growth to date     CBC:   Recent Labs   Lab 04/13/24  0501 04/14/24  0241   WBC 10.95 11.24   HGB 8.9* 9.5*   HCT 29.1* 30.6*    214     CMP:   Recent Labs   Lab 04/13/24  0501 04/14/24  0241   * 132*   K 3.5 3.5   CL 94* 94*   CO2 22* 22*   * 156*   BUN 46* 62*   CREATININE 5.7* 6.6*   CALCIUM 10.0 10.4   PROT 8.7* 8.4   ALBUMIN 3.1* 3.1*   BILITOT 0.3 0.3   ALKPHOS 107 109   AST 21 20   ALT 30 26   ANIONGAP 15 16       Significant Imaging: I have reviewed all pertinent imaging results/findings within the past 24 hours.

## 2024-04-14 NOTE — PLAN OF CARE
Problem: Infection  Goal: Absence of Infection Signs and Symptoms  Outcome: Ongoing, Progressing  Intervention: Prevent or Manage Infection  Flowsheets (Taken 4/14/2024 0112)  Fever Reduction/Comfort Measures:   lightweight bedding   lightweight clothing  Infection Management: aseptic technique maintained  Isolation Precautions:   precautions maintained   contact     Problem: Skin Injury Risk Increased  Goal: Skin Health and Integrity  Outcome: Ongoing, Progressing  Intervention: Optimize Skin Protection  Flowsheets (Taken 4/14/2024 0112)  Pressure Reduction Techniques:   heels elevated off bed   weight shift assistance provided  Pressure Reduction Devices: heel offloading device utilized  Skin Protection:   adhesive use limited   tubing/devices free from skin contact  Head of Bed (HOB) Positioning: HOB at 30 degrees  Intervention: Promote and Optimize Oral Intake  Flowsheets (Taken 4/14/2024 0112)  Oral Nutrition Promotion: rest periods promoted     Pt is awake but nonverbal. Unable to assess orientation. Pt sometimes tracks with eyes.  Pt does not follow commands.  Feeds at goal 40 ml/hr at this time.  Pt tolerating well.  No residuals.  Pt's boyfriend is at bedside.   Plan of care was discussed with pt and daughter early in shift.  No signs of distress noted.

## 2024-04-14 NOTE — ASSESSMENT & PLAN NOTE
Pt presenting with AMS and worsening lethargy. Pt is non-verbal at baseline, does not follow commands, but was less responsive than normal. Pt found to have a fever. Urinary source suspected based off of urine and CT abd/pelvis. Pt has many prior resistant bacterial infections including urinary sources. Has prior with sensitivity to zosyn and has also improved off ED dose of vanc/zosyn. Lactic elevated a 3.8->3.49 prior to completion of fluid bolus 500ml    Plan  Vanc/merrem  ID consult  F/u blood cultures (4/4 w/ GPCs)  UC w/ GNRs; awaiting sensitivities  Daily cbc  Contact precautions

## 2024-04-14 NOTE — PLAN OF CARE
Problem: Infection  Goal: Absence of Infection Signs and Symptoms  Outcome: Ongoing, Progressing     Problem: Skin Injury Risk Increased  Goal: Skin Health and Integrity  Outcome: Ongoing, Progressing     Problem: Adult Inpatient Plan of Care  Goal: Plan of Care Review  Outcome: Ongoing, Progressing  Goal: Patient-Specific Goal (Individualized)  Outcome: Ongoing, Progressing     No acute distress noted this time. Patient care ongoing. Safety measures maintained.

## 2024-04-14 NOTE — ASSESSMENT & PLAN NOTE
Creatine stable for now. BMP reviewed- noted Estimated Creatinine Clearance: 13.4 mL/min (A) (based on SCr of 6.6 mg/dL (H)). according to latest data. Based on current GFR, CKD stage is end stage.  Monitor UOP and serial BMP and adjust therapy as needed. Renally dose meds. Avoid nephrotoxic medications and procedures.    Pt MWF HD, underwent on 4/10, complete session  Plan  Nephrology consult  Will consider line holiday  Monitor fluid status

## 2024-04-14 NOTE — ASSESSMENT & PLAN NOTE
Pt is non-verbal and does not follow commands at baseline. Vascular Neurology consulted given acute change from baseline. MRI with concern for evolution of prior strokes with noted differential of T2 hyperintensities including vasculitis vs demyelination. Discussed with Vascular Neurology; low concern for ongoing vasculitis/demyelination, likely represents typical evolution of prior infarcts.    Plan  - STAT head imaging for concern of new change in mental status/focal deficit

## 2024-04-14 NOTE — PROGRESS NOTES
Pharmacist Renal Dose Adjustment Note    Sarah Saravia is a 53 y.o. female being treated with the medication meropenem.    Patient Data:    Vital Signs (Most Recent):  Temp: 98.3 °F (36.8 °C) (04/14/24 0731)  Pulse: 104 (04/14/24 0731)  Resp: 18 (04/14/24 0731)  BP: 124/76 (04/14/24 0731)  SpO2: 100 % (04/14/24 0731) Vital Signs (72h Range):  Temp:  [97.6 °F (36.4 °C)-99.4 °F (37.4 °C)]   Pulse:  []   Resp:  [15-22]   BP: ()/(63-97)   SpO2:  [94 %-100 %]      Recent Labs   Lab 04/12/24  0618 04/13/24  0501 04/14/24  0241   CREATININE 7.8* 5.7* 6.6*     Serum creatinine: 6.6 mg/dL (H) 04/14/24 0241  Estimated creatinine clearance: 13.4 mL/min (A)    Meropenem 500 mg IV ever 12 hours is being changed to meropenem 500 mg every 24 hours as the patient is on hemodialysis.     Pharmacist's Name: Annette Orozco  Pharmacist's Extension: [phone]

## 2024-04-14 NOTE — RESPIRATORY THERAPY
"RAPID RESPONSE RESPIRATORY THERAPY PROACTIVE NOTE           Time of visit: 816     Code Status: Full Code   : 1970  Bed: 8092/8092 A:   MRN: [id number]  Time spent at the bedside: < 15 min    SITUATION    Evaluated patient for: LDA Check     BACKGROUND    Patient has no past medical history on file.  Clinically Significant Surgical Hx: laryngectomy    24 Hours Vitals Range:  Temp:  [97.6 °F (36.4 °C)-99.4 °F (37.4 °C)]   Pulse:  [103-107]   Resp:  [18-22]   BP: (105-142)/(63-87)   SpO2:  [94 %-100 %]     Labs:    Recent Labs     24  0618 24  0501 24  0241   * 131* 132*   K 4.5 3.5 3.5   CL 94* 94* 94*   CO2 20* 22* 22*   BUN 83* 46* 62*   CREATININE 7.8* 5.7* 6.6*   * 190* 156*   PHOS 5.6* 3.6 3.7   MG 2.6 2.1 2.3        No results for input(s): "PH", "PCO2", "PO2", "HCO3", "POCSATURATED", "BE" in the last 72 hours.    ASSESSMENT/INTERVENTIONS  Pt resting comfortably with no respiratory interventions required at this time.      Last VS   Temp: 98.3 °F (36.8 °C) (731)  Pulse: 103 ( 1054)  Resp: 18 (731)  BP: 124/76 (731)  SpO2: 100 % ( 1054)      Extra trachs at bedside: y  Level of Consciousness: Level of Consciousness (AVPU): alert  Respiratory Effort: Respiratory Effort: Unlabored Expansion/Accessory Muscle Usage: Expansion/Accessory Muscles/Retractions: expansion symmetric, no retractions, no use of accessory muscles  All Lung Field Breath Sounds: All Lung Fields Breath Sounds: Anterior:, Lateral:, diminished  JOSE Breath Sounds: (P) diminished  O2 Device/Concentration: 5L/21%  Surgical airway: Yes, laryngectomy,   Ambu at bedside:       Active Orders   Respiratory Care    Oxygen Continuous     Frequency: Continuous     Number of Occurrences: Until Specified     Order Questions:      Device type: Low flow      Device: Trach Collar      FiO2%: 28%      Titrate O2 per Oxygen Titration Protocol: Yes      To maintain SpO2 goal of: >= 90%      Notify " MD of: Inability to achieve desired SpO2; Sudden change in patient status and requires 20% increase in FiO2; Patient requires >60% FiO2    Pulse Oximetry Continuous     Frequency: Continuous     Number of Occurrences: Until Specified    Routine tracheostomy care     Frequency: BID     Number of Occurrences: Until Specified       RECOMMENDATIONS    We recommend: RRT Recs: Continue POC per primary team.      FOLLOW-UP    Please call back the Rapid Response RT, Constantine Mckinney RRT at x [phone] for any questions or concerns.

## 2024-04-15 ENCOUNTER — OFFICE VISIT (OUTPATIENT)
Dept: DIALYSIS | Facility: HOSPITAL | Age: 54
DRG: 689 | End: 2024-04-15
Attending: EMERGENCY MEDICINE
Payer: MEDICAID

## 2024-04-15 LAB
ANION GAP SERPL CALC-SCNC: 18 MMOL/L (ref 8–16)
BACTERIA BLD CULT: ABNORMAL
BACTERIA UR CULT: ABNORMAL
BASOPHILS # BLD AUTO: 0.06 K/UL (ref 0–0.2)
BASOPHILS NFR BLD: 0.5 % (ref 0–1.9)
BSA FOR ECHO PROCEDURE: 2.41 M2
BUN SERPL-MCNC: 77 MG/DL (ref 6–20)
CALCIUM SERPL-MCNC: 10.4 MG/DL (ref 8.7–10.5)
CHLORIDE SERPL-SCNC: 93 MMOL/L (ref 95–110)
CO2 SERPL-SCNC: 20 MMOL/L (ref 23–29)
CREAT SERPL-MCNC: 7.6 MG/DL (ref 0.5–1.4)
CV ECHO LV RWT: 0.43 CM
DIFFERENTIAL METHOD BLD: ABNORMAL
ECHO LV POSTERIOR WALL: 0.88 CM (ref 0.6–1.1)
EJECTION FRACTION: 60 %
EOSINOPHIL # BLD AUTO: 0.8 K/UL (ref 0–0.5)
EOSINOPHIL NFR BLD: 6.6 % (ref 0–8)
ERYTHROCYTE [DISTWIDTH] IN BLOOD BY AUTOMATED COUNT: 16.1 % (ref 11.5–14.5)
EST. GFR  (NO RACE VARIABLE): 5.9 ML/MIN/1.73 M^2
FRACTIONAL SHORTENING: 37 % (ref 28–44)
GLUCOSE SERPL-MCNC: 120 MG/DL (ref 70–110)
HCT VFR BLD AUTO: 30.1 % (ref 37–48.5)
HGB BLD-MCNC: 9.2 G/DL (ref 12–16)
IMM GRANULOCYTES # BLD AUTO: 0.13 K/UL (ref 0–0.04)
IMM GRANULOCYTES NFR BLD AUTO: 1.1 % (ref 0–0.5)
INTERVENTRICULAR SEPTUM: 0.77 CM (ref 0.6–1.1)
LEFT ATRIUM SIZE: 2.63 CM
LEFT INTERNAL DIMENSION IN SYSTOLE: 2.55 CM (ref 2.1–4)
LEFT VENTRICLE DIASTOLIC VOLUME INDEX: 31.75 ML/M2
LEFT VENTRICLE DIASTOLIC VOLUME: 73.03 ML
LEFT VENTRICLE MASS INDEX: 44 G/M2
LEFT VENTRICLE SYSTOLIC VOLUME INDEX: 10.2 ML/M2
LEFT VENTRICLE SYSTOLIC VOLUME: 23.45 ML
LEFT VENTRICULAR INTERNAL DIMENSION IN DIASTOLE: 4.07 CM (ref 3.5–6)
LEFT VENTRICULAR MASS: 100.22 G
LYMPHOCYTES # BLD AUTO: 2.3 K/UL (ref 1–4.8)
LYMPHOCYTES NFR BLD: 20.2 % (ref 18–48)
MAGNESIUM SERPL-MCNC: 2.4 MG/DL (ref 1.6–2.6)
MCH RBC QN AUTO: 27.9 PG (ref 27–31)
MCHC RBC AUTO-ENTMCNC: 30.6 G/DL (ref 32–36)
MCV RBC AUTO: 91 FL (ref 82–98)
MONOCYTES # BLD AUTO: 1.1 K/UL (ref 0.3–1)
MONOCYTES NFR BLD: 9.6 % (ref 4–15)
NEUTROPHILS # BLD AUTO: 7.1 K/UL (ref 1.8–7.7)
NEUTROPHILS NFR BLD: 62 % (ref 38–73)
NRBC BLD-RTO: 0 /100 WBC
PHOSPHATE SERPL-MCNC: 3.7 MG/DL (ref 2.7–4.5)
PISA TR MAX VEL: 2 M/S
PLATELET # BLD AUTO: 248 K/UL (ref 150–450)
PMV BLD AUTO: 10.8 FL (ref 9.2–12.9)
POCT GLUCOSE: 109 MG/DL (ref 70–110)
POCT GLUCOSE: 116 MG/DL (ref 70–110)
POCT GLUCOSE: 123 MG/DL (ref 70–110)
POCT GLUCOSE: 126 MG/DL (ref 70–110)
POCT GLUCOSE: 131 MG/DL (ref 70–110)
POCT GLUCOSE: 141 MG/DL (ref 70–110)
POCT GLUCOSE: 147 MG/DL (ref 70–110)
POCT GLUCOSE: 156 MG/DL (ref 70–110)
POTASSIUM SERPL-SCNC: 3.4 MMOL/L (ref 3.5–5.1)
RBC # BLD AUTO: 3.3 M/UL (ref 4–5.4)
SODIUM SERPL-SCNC: 131 MMOL/L (ref 136–145)
TR MAX PG: 16 MMHG
VANCOMYCIN SERPL-MCNC: 18.7 UG/ML
WBC # BLD AUTO: 11.46 K/UL (ref 3.9–12.7)
Z-SCORE OF LEFT VENTRICULAR DIMENSION IN END DIASTOLE: -7.84
Z-SCORE OF LEFT VENTRICULAR DIMENSION IN END SYSTOLE: -5.9

## 2024-04-15 PROCEDURE — 84100 ASSAY OF PHOSPHORUS: CPT

## 2024-04-15 PROCEDURE — 3066F NEPHROPATHY DOC TX: CPT | Mod: CPTII,,, | Performed by: NURSE PRACTITIONER

## 2024-04-15 PROCEDURE — 25000003 PHARM REV CODE 250

## 2024-04-15 PROCEDURE — 63600175 PHARM REV CODE 636 W HCPCS: Performed by: NURSE PRACTITIONER

## 2024-04-15 PROCEDURE — 90935 HEMODIALYSIS ONE EVALUATION: CPT | Mod: ,,, | Performed by: NURSE PRACTITIONER

## 2024-04-15 PROCEDURE — 80100014 HC HEMODIALYSIS 1:1

## 2024-04-15 PROCEDURE — 63600175 PHARM REV CODE 636 W HCPCS: Performed by: STUDENT IN AN ORGANIZED HEALTH CARE EDUCATION/TRAINING PROGRAM

## 2024-04-15 PROCEDURE — 85025 COMPLETE CBC W/AUTO DIFF WBC: CPT

## 2024-04-15 PROCEDURE — 25000242 PHARM REV CODE 250 ALT 637 W/ HCPCS

## 2024-04-15 PROCEDURE — 83735 ASSAY OF MAGNESIUM: CPT

## 2024-04-15 PROCEDURE — 25000003 PHARM REV CODE 250: Performed by: STUDENT IN AN ORGANIZED HEALTH CARE EDUCATION/TRAINING PROGRAM

## 2024-04-15 PROCEDURE — 27000207 HC ISOLATION

## 2024-04-15 PROCEDURE — 80202 ASSAY OF VANCOMYCIN: CPT | Performed by: STUDENT IN AN ORGANIZED HEALTH CARE EDUCATION/TRAINING PROGRAM

## 2024-04-15 PROCEDURE — 3046F HEMOGLOBIN A1C LEVEL >9.0%: CPT | Mod: CPTII,,, | Performed by: NURSE PRACTITIONER

## 2024-04-15 PROCEDURE — 80048 BASIC METABOLIC PNL TOTAL CA: CPT

## 2024-04-15 PROCEDURE — 99900026 HC AIRWAY MAINTENANCE (STAT)

## 2024-04-15 PROCEDURE — 20600001 HC STEP DOWN PRIVATE ROOM

## 2024-04-15 PROCEDURE — 94761 N-INVAS EAR/PLS OXIMETRY MLT: CPT

## 2024-04-15 PROCEDURE — 99900035 HC TECH TIME PER 15 MIN (STAT)

## 2024-04-15 PROCEDURE — 63600175 PHARM REV CODE 636 W HCPCS

## 2024-04-15 PROCEDURE — 99233 SBSQ HOSP IP/OBS HIGH 50: CPT | Mod: ,,, | Performed by: PHYSICIAN ASSISTANT

## 2024-04-15 RX ADMIN — ASPIRIN 81 MG CHEWABLE TABLET 81 MG: 81 TABLET CHEWABLE at 01:04

## 2024-04-15 RX ADMIN — PSYLLIUM HUSK 1 PACKET: 3.4 POWDER ORAL at 01:04

## 2024-04-15 RX ADMIN — Medication 500 MG: at 01:04

## 2024-04-15 RX ADMIN — ZINC SULFATE 220 MG (50 MG) CAPSULE 220 MG: CAPSULE at 01:04

## 2024-04-15 RX ADMIN — INSULIN ASPART 3 UNITS: 100 INJECTION, SOLUTION INTRAVENOUS; SUBCUTANEOUS at 02:04

## 2024-04-15 RX ADMIN — ERYTHROPOIETIN 6100 UNITS: 10000 INJECTION, SOLUTION INTRAVENOUS; SUBCUTANEOUS at 11:04

## 2024-04-15 RX ADMIN — INSULIN ASPART 3 UNITS: 100 INJECTION, SOLUTION INTRAVENOUS; SUBCUTANEOUS at 09:04

## 2024-04-15 RX ADMIN — HEPARIN SODIUM 7500 UNITS: 5000 INJECTION INTRAVENOUS; SUBCUTANEOUS at 05:04

## 2024-04-15 RX ADMIN — INSULIN ASPART 3 UNITS: 100 INJECTION, SOLUTION INTRAVENOUS; SUBCUTANEOUS at 05:04

## 2024-04-15 RX ADMIN — Medication 500 MG: at 09:04

## 2024-04-15 RX ADMIN — INSULIN DETEMIR 27 UNITS: 100 INJECTION, SOLUTION SUBCUTANEOUS at 08:04

## 2024-04-15 RX ADMIN — PANTOPRAZOLE SODIUM 40 MG: 40 GRANULE, DELAYED RELEASE ORAL at 01:04

## 2024-04-15 RX ADMIN — HEPARIN SODIUM 7500 UNITS: 5000 INJECTION INTRAVENOUS; SUBCUTANEOUS at 01:04

## 2024-04-15 RX ADMIN — INSULIN DETEMIR 27 UNITS: 100 INJECTION, SOLUTION SUBCUTANEOUS at 09:04

## 2024-04-15 RX ADMIN — INSULIN ASPART 3 UNITS: 100 INJECTION, SOLUTION INTRAVENOUS; SUBCUTANEOUS at 06:04

## 2024-04-15 RX ADMIN — ERTAPENEM 1 G: 1 INJECTION INTRAMUSCULAR; INTRAVENOUS at 09:04

## 2024-04-15 RX ADMIN — SEVELAMER CARBONATE 2.4 G: 2400 POWDER, FOR SUSPENSION ORAL at 01:04

## 2024-04-15 RX ADMIN — SEVELAMER CARBONATE 2.4 G: 2400 POWDER, FOR SUSPENSION ORAL at 09:04

## 2024-04-15 RX ADMIN — VANCOMYCIN HYDROCHLORIDE 500 MG: 500 INJECTION, POWDER, LYOPHILIZED, FOR SOLUTION INTRAVENOUS at 02:04

## 2024-04-15 RX ADMIN — INSULIN ASPART 3 UNITS: 100 INJECTION, SOLUTION INTRAVENOUS; SUBCUTANEOUS at 11:04

## 2024-04-15 RX ADMIN — HEPARIN SODIUM 1000 UNITS: 1000 INJECTION, SOLUTION INTRAVENOUS; SUBCUTANEOUS at 12:04

## 2024-04-15 RX ADMIN — HEPARIN SODIUM 7500 UNITS: 5000 INJECTION INTRAVENOUS; SUBCUTANEOUS at 09:04

## 2024-04-15 NOTE — PROGRESS NOTES
Pharmacokinetic Assessment Follow Up: IV Vancomycin    Vancomycin serum concentration assessment/plan(s):    -Vancomycin random 18.7 within goal of 15 - 20 for bacteremia preHD level  -Re-dose with vancomycin 500 mg IVPB x1 post HD  -Next random level on 04/17 as preHD level  -Plans for re-dosing with levels < 20    Drug levels (last 3 results):  Recent Labs   Lab Result Units 04/12/24  0935 04/15/24  0309   Vancomycin, Random ug/mL 21.0 18.7       Pharmacy will continue to follow and monitor vancomycin.    Please contact pharmacy at extension [phone] for questions regarding this assessment.    Thank you for the consult,   Brett Ramirez       Patient brief summary:  Sarah Saravia is a 53 y.o. female initiated on antimicrobial therapy with IV Vancomycin for treatment of bacteremia    The patient's current regimen is vancomycin pulse dosing    Drug Allergies:   Review of patient's allergies indicates:  No Known Allergies    Actual Body Weight:   123 kg    Renal Function:   Estimated Creatinine Clearance: 11.6 mL/min (A) (based on SCr of 7.6 mg/dL (H)).,     Dialysis Method (if applicable):  intermittent HD    CBC (last 72 hours):  Recent Labs   Lab Result Units 04/13/24  0501 04/14/24  0241 04/15/24  0309   WBC K/uL 10.95 11.24 11.46   Hemoglobin g/dL 8.9* 9.5* 9.2*   Hematocrit % 29.1* 30.6* 30.1*   Platelets K/uL 209 214 248   Gran % % 59.5 59.8 62.0   Lymph % % 22.3 23.2 20.2   Mono % % 11.6 9.4 9.6   Eosinophil % % 5.2 6.0 6.6   Basophil % % 0.4 0.5 0.5   Differential Method  Automated Automated Automated       Metabolic Panel (last 72 hours):  Recent Labs   Lab Result Units 04/13/24  0501 04/14/24  0241 04/15/24  0309   Sodium mmol/L 131* 132* 131*   Potassium mmol/L 3.5 3.5 3.4*   Chloride mmol/L 94* 94* 93*   CO2 mmol/L 22* 22* 20*   Glucose mg/dL 190* 156* 120*   BUN mg/dL 46* 62* 77*   Creatinine mg/dL 5.7* 6.6* 7.6*   Albumin g/dL 3.1* 3.1*  --    Total Bilirubin mg/dL 0.3 0.3  --    Alkaline Phosphatase U/L  107 109  --    AST U/L 21 20  --    ALT U/L 30 26  --    Magnesium mg/dL 2.1 2.3 2.4   Phosphorus mg/dL 3.6 3.7 3.7       Vancomycin Administrations:  vancomycin given in the last 96 hours        No antibiotic orders with administrations found.                    Microbiologic Results:  Microbiology Results (last 7 days)       Procedure Component Value Units Date/Time    Urine culture [[id number]]  (Abnormal)  (Susceptibility) Collected: 04/11/24 0103    Order Status: Completed Specimen: Urine Updated: 04/14/24 1422     Urine Culture, Routine PROTEUS MIRABILIS ESBL  10,000 - 49,999 cfu/ml      Narrative:      Specimen Source->Urine    Blood culture [[id number]] Collected: 04/13/24 1103    Order Status: Completed Specimen: Blood from Peripheral, Antecubital, Left Updated: 04/14/24 1412     Blood Culture, Routine No Growth to date      No Growth to date    Blood culture [[id number]] Collected: 04/13/24 1012    Order Status: Completed Specimen: Blood from Peripheral, Hand, Left Updated: 04/14/24 1212     Blood Culture, Routine No Growth to date      No Growth to date    Narrative:      Please draw from 2 separate sites 30 min apart  Thank you    Blood culture x two cultures. Draw prior to antibiotics. [[id number]]  (Abnormal) Collected: 04/10/24 2243    Order Status: Completed Specimen: Blood from Peripheral, Hand, Right Updated: 04/14/24 1005     Blood Culture, Routine Gram stain erasmo bottle: Gram positive cocci in clusters resembling Staph      Positive results previously called 04/12/2024      Gram stain aer bottle: Gram positive cocci in clusters resembling Staph      04/13/2024  02:40      STAPHYLOCOCCUS EPIDERMIDIS  Susceptibility pending      Narrative:      Aerobic and anaerobic    Blood culture x two cultures. Draw prior to antibiotics. [[id number]]  (Abnormal) Collected: 04/10/24 2243    Order Status: Completed Specimen: Blood from Peripheral, Antecubital, Right Updated: 04/14/24 1005     Blood Culture,  Routine Gram stain aer bottle: Gram positive cocci in clusters resembling Staph      Gram stain erasmo bottle: Gram positive cocci in clusters resembling Staph      Results called to and read back by: Radha Starks 04/12/2024  05:06      STAPHYLOCOCCUS EPIDERMIDIS  Susceptibility pending      Narrative:      Aerobic and anaerobic    Blood culture [[id number]]     Order Status: Canceled Specimen: Blood     Blood culture [[id number]]     Order Status: Canceled Specimen: Blood     Blood culture [[id number]]     Order Status: Canceled Specimen: Blood     Blood culture [[id number]]     Order Status: Canceled Specimen: Blood     MRSA/SA Rapid ID by PCR from Blood culture [[id number]] Collected: 04/10/24 2243    Order Status: Completed Updated: 04/12/24 0623     Staph aureus ID by PCR Negative     Methicillin Resistant ID by PCR Negative    Narrative:      Aerobic and anaerobic

## 2024-04-15 NOTE — SUBJECTIVE & OBJECTIVE
Interval History:   Blood cultures on admission + Staph epidermidis (4/4 bottles - drawn at same time)  Urine cx + ESBL Proteus mirabilis  On Vanc/Meropenem. Afebrile. Leukocytosis resolved. Repeat blood cx NGTD.      Review of Systems   Unable to perform ROS: Patient nonverbal     Objective:     Vital Signs (Most Recent):  Temp: 97.5 °F (36.4 °C) (04/15/24 1230)  Pulse: 101 (04/15/24 1320)  Resp: 16 (04/15/24 1320)  BP: 102/71 (04/15/24 1230)  SpO2: 99 % (04/15/24 1320) Vital Signs (24h Range):  Temp:  [97.5 °F (36.4 °C)-99.2 °F (37.3 °C)] 97.5 °F (36.4 °C)  Pulse:  [] 101  Resp:  [16-19] 16  SpO2:  [96 %-100 %] 99 %  BP: ()/(63-84) 102/71     Weight: 122.6 kg (270 lb 4.5 oz)  Body mass index is 42.33 kg/m².    Estimated Creatinine Clearance: 11.6 mL/min (A) (based on SCr of 7.6 mg/dL (H)).     Physical Exam  Vitals and nursing note reviewed.   Constitutional:       Appearance: She is well-developed.      Comments: Somnolent on exam   HENT:      Head: Normocephalic and atraumatic.   Eyes:      Pupils: Pupils are equal, round, and reactive to light.   Neck:      Comments: Trach site   Cardiovascular:      Rate and Rhythm: Normal rate and regular rhythm.      Heart sounds: Normal heart sounds.   Pulmonary:      Effort: Pulmonary effort is normal. No respiratory distress.      Breath sounds: Normal breath sounds. No wheezing, rhonchi or rales.   Abdominal:      General: Bowel sounds are normal. There is no distension.      Palpations: Abdomen is soft. There is no mass.      Tenderness: There is no abdominal tenderness.   Musculoskeletal:      Cervical back: Normal range of motion and neck supple.   Skin:     General: Skin is warm and dry.      Coloration: Skin is not pale.      Findings: No erythema.      Comments: Right groin wound c/d/I. No purulent drainage    Tunneled line catheter c/d/I        Neurological:      Mental Status: She is alert.          Significant Labs: All pertinent labs within the past  24 hours have been reviewed.    Significant Imaging: I have reviewed all pertinent imaging results/findings within the past 24 hours.

## 2024-04-15 NOTE — NURSING
Pt being transported to dialysis in bed via pt transport, respiratory therapist & nurse. No acute distress at this time

## 2024-04-15 NOTE — PROGRESS NOTES
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 She most recently completed a course of meropenem for pseudomonas SSTI of right groin wound on 3/21/24. She is admitted from [facility] for AMS and a leukocytosis.     CT abdomen/pelvis concerning for cystitis as well as a UA (obtained via straight cath) concerning for a UTI.  Urine culture grew ESBL Proteus mirabilis.Blood cultures from admit are positive for Staph epidermidis (drawn at same time). Unclear if representative of contamination or true bacteremia.Awaiting susceptibilities. Tunneled catheter and wound sites are without acute SOI. Patient is currently on Vancomycin and Meropenem. Afebrile. Leukocytosis resolved, Mentation returning to baseline per primary team. Repeat blood cx 4/13 NGTD.      Recommendations  Continue IV Vancomycin for now (renal dosing). Follow up Staph epidermidis susceptibilities and repeat blood cultures   Patient has received four days of Meropenem. Can deescalate Meropenem to Ertapenem for ESBL Proteus mirabilis UTI.   Continue local wound care  Discussed plan with ID staff. ID will follow with you.          Thank you for the consult. Please secure chat for any questions.  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 Reportedly patient was less responsive than her baseline had an episode of emesis, in the ED she had a CT abdomen/pelvis concerning for cystitis as well as a UA (obtained via straight cath) concerning for a UTI. She was initially started on vancomycin and Zosyn later broadened to meropenem. On presentation her labs were notable for leukocytosis of 17, as well as a lactic acidosis of 3.9 that has improved to 2.4 with the administration of 1 L of IV fluids, we are being cautious with fluid repletion given her end-stage renal disease and oliguric status. Infectious diseases consulted for her history of multiple drug-resistant organisms.       She completed meropenem on 3/21/24 for right groin wound infection. She is followed by wound care closely. Wuond appear stable without active signs of infection. Tmax 100, WBC 17K on admission. She is currently on vancomycin and meroepnem. Blood cultures NGTD. CXR negaitve for focal consolidations.   Interval History:   Blood cultures on admission + Staph epidermidis (4/4 bottles - drawn at same time)  Urine cx + ESBL Proteus mirabilis  On Vanc/Meropenem. Afebrile. Leukocytosis resolved. Repeat blood cx NGTD.      Review of Systems   Unable to perform ROS: Patient nonverbal     Objective:     Vital Signs (Most Recent):  Temp: 97.5 °F (36.4 °C) (04/15/24 1230)  Pulse: 101 (04/15/24 1320)  Resp: 16 (04/15/24 1320)  BP: 102/71 (04/15/24 1230)  SpO2: 99 % (04/15/24 1320) Vital Signs (24h Range):  Temp:  [97.5 °F (36.4 °C)-99.2 °F (37.3 °C)] 97.5 °F (36.4 °C)  Pulse:  [] 101  Resp:  [16-19] 16  SpO2:  [96 %-100 %] 99 %  BP: ()/(63-84) 102/71     Weight: 122.6 kg (270 lb 4.5 oz)  Body mass index is 42.33 kg/m².    Estimated  Creatinine Clearance: 11.6 mL/min (A) (based on SCr of 7.6 mg/dL (H)).     Physical Exam  Vitals and nursing note reviewed.   Constitutional:       Appearance: She is well-developed.      Comments: Somnolent on exam   HENT:      Head: Normocephalic and atraumatic.   Eyes:      Pupils: Pupils are equal, round, and reactive to light.   Neck:      Comments: Trach site   Cardiovascular:      Rate and Rhythm: Normal rate and regular rhythm.      Heart sounds: Normal heart sounds.   Pulmonary:      Effort: Pulmonary effort is normal. No respiratory distress.      Breath sounds: Normal breath sounds. No wheezing, rhonchi or rales.   Abdominal:      General: Bowel sounds are normal. There is no distension.      Palpations: Abdomen is soft. There is no mass.      Tenderness: There is no abdominal tenderness.   Musculoskeletal:      Cervical back: Normal range of motion and neck supple.   Skin:     General: Skin is warm and dry.      Coloration: Skin is not pale.      Findings: No erythema.      Comments: Right groin wound c/d/I. No purulent drainage    Tunneled line catheter c/d/I        Neurological:      Mental Status: She is alert.          Significant Labs: All pertinent labs within the past 24 hours have been reviewed.    Significant Imaging: I have reviewed all pertinent imaging results/findings within the past 24 hours.

## 2024-04-15 NOTE — PROGRESS NOTES
Pt to [facility] via bed for maintenance HD. Contact precautions maintained. Pt connected to [facility] monitor. Trach connected to humidity via RT. HD started via RIJ tunnel cath without difficulty.

## 2024-04-15 NOTE — PLAN OF CARE
Problem: Diabetes Comorbidity  Goal: Blood Glucose Level Within Targeted Range  Outcome: Ongoing, Progressing  Intervention: Monitor and Manage Glycemia  Flowsheets (Taken 4/15/2024 0227)  Glycemic Management:   blood glucose monitored   other (see comments)       Pt is awake but nonverbal. Unable to assess orientation. Pt sometimes tracks with eyes.  Pt does not follow commands.  Feeds continue at goal 40 ml/hr at this time.  Pt tolerating well.  No residual   Plan of care was discussed with pt and daughter early in shift.  No signs of distress noted.

## 2024-04-15 NOTE — PROGRESS NOTES
3.5hr HD complete. 1L fluid removal. Report given to Stacia JOE. Pt did clot off arounf half way through treatment, amchine rebuilt and charge RN aware. RIJ tunnel cath heparin locked. Pt transported to room via bed with transport and Angy RN

## 2024-04-15 NOTE — ASSESSMENT & PLAN NOTE
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 She most recently completed a course of meropenem for pseudomonas SSTI of right groin wound on 3/21/24. She is admitted from [facility] for AMS and a leukocytosis.     CT abdomen/pelvis concerning for cystitis as well as a UA (obtained via straight cath) concerning for a UTI.  Urine culture grew ESBL Proteus mirabilis.Blood cultures from admit are positive for Staph epidermidis (drawn at same time). Unclear if representative of contamination or true bacteremia.Awaiting susceptibilities. Tunneled catheter and wound sites are without acute SOI. Patient is currently on Vancomycin and Meropenem. Afebrile. Leukocytosis resolved, Mentation returning to baseline per primary team. Repeat blood cx 4/13 NGTD.      Recommendations  Continue IV Vancomycin for now (renal dosing). Follow up Staph epidermidis susceptibilities and repeat blood cultures   Patient has received four days of Meropenem. Can deescalate Meropenem to Ertapenem for ESBL Proteus mirabilis UTI.   Continue local wound care  Discussed plan with ID staff. ID will follow with you.

## 2024-04-15 NOTE — PLAN OF CARE
CM received message from care team regarding potential discharge later this afternoon. Patient admitted from [facility].    Patient came in on tube feeds and has tolerated goal rate well this admit. Anticipate 2 more days of IV antibiotics after today, however, still pending official ID recs.     Dunia Burton RN  Weekend  - [facility]  Spectralink: [phone]

## 2024-04-15 NOTE — RESPIRATORY THERAPY
"RAPID RESPONSE RESPIRATORY THERAPY PROACTIVE NOTE           Time of visit: 825     Code Status: Full Code   : 1970  Bed: 8092/8092 A:   MRN: [id number]  Time spent at the bedside: < 15 min    SITUATION    Evaluated patient for: LDA Check     BACKGROUND    Patient has a past medical history of DM (diabetes mellitus), Ayaz's gangrene in female, Morbid obesity, and Necrotizing fasciitis.  Clinically Significant Surgical Hx: laryngectomy    24 Hours Vitals Range:  Temp:  [97.5 °F (36.4 °C)-99 °F (37.2 °C)]   Pulse:  []   Resp:  [14-24]   BP: ()/(63-84)   SpO2:  [96 %-100 %]     Labs:    Recent Labs     24  0501 24  0241 04/15/24  0309   * 132* 131*   K 3.5 3.5 3.4*   CL 94* 94* 93*   CO2 22* 22* 20*   BUN 46* 62* 77*   CREATININE 5.7* 6.6* 7.6*   * 156* 120*   PHOS 3.6 3.7 3.7   MG 2.1 2.3 2.4        No results for input(s): "PH", "PCO2", "PO2", "HCO3", "POCSATURATED", "BE" in the last 72 hours.    ASSESSMENT/INTERVENTIONS        Last VS   Temp: 99 °F (37.2 °C) (04/15 1544)  Pulse: 102 (04/15 1545)  Resp: 14 (04/15 1545)  BP: 138/78 (04/15 1544)  SpO2: 100 % (04/15 1545)      Extra trachs at bedside: 678 ett  Level of Consciousness: Level of Consciousness (AVPU): responds to voice  Respiratory Effort: Respiratory Effort: Normal, Unlabored Expansion/Accessory Muscle Usage: Expansion/Accessory Muscles/Retractions: no use of accessory muscles, no retractions, expansion symmetric  All Lung Field Breath Sounds: All Lung Fields Breath Sounds: Anterior:, Lateral:, coarse  JOSE Breath Sounds: diminished  O2 Device/Concentration: 5l 21%  Surgical airway: Yes, laryngectomy,  .  Ambu at bedside:       Active Orders   Respiratory Care    Oxygen Continuous     Frequency: Continuous     Number of Occurrences: Until Specified     Order Questions:      Device type: Low flow      Device: Trach Collar      FiO2%: 28%      Titrate O2 per Oxygen Titration Protocol: Yes      To maintain SpO2 " goal of: >= 90%      Notify MD of: Inability to achieve desired SpO2; Sudden change in patient status and requires 20% increase in FiO2; Patient requires >60% FiO2    Pulse Oximetry Continuous     Frequency: Continuous     Number of Occurrences: Until Specified    Routine tracheostomy care     Frequency: BID     Number of Occurrences: Until Specified       RECOMMENDATIONS    We recommend: RRT Recs: Continue POC per primary team.      FOLLOW-UP    Please call back the Rapid Response RT, Kathy Hall, RRT at x [phone] for any questions or concerns.

## 2024-04-15 NOTE — PROGRESS NOTES
[facility] NOTE     Patient currently on hemodialysis for removal of uremic toxins .     Patient seen and evaluated on hemodialysis, tolerating treatment, see HD flowsheet for vitals and assessments.      No Hypotension, chest pain, shortness of breath, cramping, nausea or vomiting.     Target UF: 1 L as tolerated, keep MAP >65.  No distress on exam. Oxygenating well on tach collar. Admitted with AMS. Bcx grew staph epi in 4/4 bottles. ID following. Possible plans for line holiday.   Continue Sevelamer.   Hgb 9.2 - will start EPO.  Continue to monitor intake and output, daily weights   Please avoid gadolinium, fleets, phos-based laxatives, NSAIDs  Will follow closely and continue dialysis treatments while in-patient    SHERLY Gregory DNP, APRN, FNP-C  Department of Nephrology  [facility]  Pager: [phone]

## 2024-04-15 NOTE — PROGRESS NOTES
[facility] - Telemetry Stepdown  Wound Care    Patient Name:  Sarah Saravia   MRN:  [id number]  Date: 4/15/2024  Diagnosis: Acute cystitis with hematuria    History:     No past medical history on file.    Social History     Socioeconomic History    Marital status: Single   Tobacco Use    Smoking status: Unknown     Social Determinants of Health     Financial Resource Strain: Patient Unable To Answer (3/14/2024)    Overall Financial Resource Strain (CARDIA)     Difficulty of Paying Living Expenses: Patient unable to answer   Recent Concern: Financial Resource Strain - High Risk (3/9/2024)    Overall Financial Resource Strain (CARDIA)     Difficulty of Paying Living Expenses: Very hard   Food Insecurity: Patient Unable To Answer (3/14/2024)    Hunger Vital Sign     Worried About Running Out of Food in the Last Year: Patient unable to answer     Ran Out of Food in the Last Year: Patient unable to answer   Transportation Needs: Patient Unable To Answer (3/14/2024)    PRAPARE - Transportation     Lack of Transportation (Medical): Patient unable to answer     Lack of Transportation (Non-Medical): Patient unable to answer   Physical Activity: Inactive (3/13/2024)    Exercise Vital Sign     Days of Exercise per Week: 0 days     Minutes of Exercise per Session: 0 min   Stress: Patient Unable To Answer (3/14/2024)    [employer] - Occupational Stress Questionnaire     Feeling of Stress : Patient unable to answer   Social Connections: Socially Isolated (3/13/2024)    Social Connection and Isolation Panel [NHANES]     Frequency of Communication with Friends and Family: More than three times a week     Frequency of Social Gatherings with Friends and Family: More than three times a week     Attends [employer] Services: Never     Active Member of Clubs or Organizations: No     Attends Club or Organization Meetings: Never     Marital Status: Never    Housing Stability: Patient Unable To Answer  (3/14/2024)    Housing Stability Vital Sign     Unable to Pay for Housing in the Last Year: Patient unable to answer     Number of Places Lived in the Last Year: 1     Unstable Housing in the Last Year: Patient unable to answer   Recent Concern: Housing Stability - High Risk (3/9/2024)    Housing Stability Vital Sign     Unable to Pay for Housing in the Last Year: Yes     Unstable Housing in the Last Year: No       Precautions:     Allergies as of 04/10/2024    (No Known Allergies)       WO Assessment Details/Treatment     Patient seen for wound care consultation. -pt not seen due to off the floor multiple times when attempt for assmt tried- Primary nurse messaged to inform of pt's return; otherwise, will return in attempt to see pt for requested care/ skin assmt.    Reviewed chart for this encounter.   See Flow Sheet for findings.    Discussed POC with primary nurse.     Bedside nursing to continue care & monitoring.  Bedside nursing to maintain pressure injury prevention interventions.  Current documented Tomas score is 13 with a nutrition sub scale score of 3.     04/15/24 1220   WOCN Assessment   WOCN Total Time (mins) 15   Visit Date 04/15/24   Visit Time 1220   Consult Type New;Follow Up   WO Speciality Wound   Intervention chart review  (pt off the floor at . Primary nurse chatted to inform of return to allow for consult completion.)     Will continue to follow as needed and/ or as directed until discharge.    Niki TIWARIN, RN, CWOCN  04/15/2024

## 2024-04-16 LAB
ANION GAP SERPL CALC-SCNC: 13 MMOL/L (ref 8–16)
BACTERIA BLD CULT: ABNORMAL
BASOPHILS # BLD AUTO: 0.05 K/UL (ref 0–0.2)
BASOPHILS NFR BLD: 0.4 % (ref 0–1.9)
BUN SERPL-MCNC: 46 MG/DL (ref 6–20)
CALCIUM SERPL-MCNC: 9.7 MG/DL (ref 8.7–10.5)
CHLORIDE SERPL-SCNC: 92 MMOL/L (ref 95–110)
CO2 SERPL-SCNC: 23 MMOL/L (ref 23–29)
CREAT SERPL-MCNC: 5 MG/DL (ref 0.5–1.4)
DIFFERENTIAL METHOD BLD: ABNORMAL
EOSINOPHIL # BLD AUTO: 0.7 K/UL (ref 0–0.5)
EOSINOPHIL NFR BLD: 5.9 % (ref 0–8)
ERYTHROCYTE [DISTWIDTH] IN BLOOD BY AUTOMATED COUNT: 16.3 % (ref 11.5–14.5)
EST. GFR  (NO RACE VARIABLE): 9.8 ML/MIN/1.73 M^2
GLUCOSE SERPL-MCNC: 132 MG/DL (ref 70–110)
HCT VFR BLD AUTO: 30.4 % (ref 37–48.5)
HGB BLD-MCNC: 9.4 G/DL (ref 12–16)
IMM GRANULOCYTES # BLD AUTO: 0.15 K/UL (ref 0–0.04)
IMM GRANULOCYTES NFR BLD AUTO: 1.3 % (ref 0–0.5)
LYMPHOCYTES # BLD AUTO: 2.6 K/UL (ref 1–4.8)
LYMPHOCYTES NFR BLD: 21.7 % (ref 18–48)
MAGNESIUM SERPL-MCNC: 2.1 MG/DL (ref 1.6–2.6)
MCH RBC QN AUTO: 28.5 PG (ref 27–31)
MCHC RBC AUTO-ENTMCNC: 30.9 G/DL (ref 32–36)
MCV RBC AUTO: 92 FL (ref 82–98)
MONOCYTES # BLD AUTO: 1.3 K/UL (ref 0.3–1)
MONOCYTES NFR BLD: 10.9 % (ref 4–15)
NEUTROPHILS # BLD AUTO: 7.2 K/UL (ref 1.8–7.7)
NEUTROPHILS NFR BLD: 59.8 % (ref 38–73)
NRBC BLD-RTO: 0 /100 WBC
PHOSPHATE SERPL-MCNC: 2.7 MG/DL (ref 2.7–4.5)
PLATELET # BLD AUTO: 209 K/UL (ref 150–450)
PMV BLD AUTO: 10.4 FL (ref 9.2–12.9)
POCT GLUCOSE: 131 MG/DL (ref 70–110)
POCT GLUCOSE: 135 MG/DL (ref 70–110)
POCT GLUCOSE: 148 MG/DL (ref 70–110)
POCT GLUCOSE: 153 MG/DL (ref 70–110)
POCT GLUCOSE: 155 MG/DL (ref 70–110)
POCT GLUCOSE: 159 MG/DL (ref 70–110)
POCT GLUCOSE: 169 MG/DL (ref 70–110)
POCT GLUCOSE: 169 MG/DL (ref 70–110)
POTASSIUM SERPL-SCNC: 3.7 MMOL/L (ref 3.5–5.1)
RBC # BLD AUTO: 3.3 M/UL (ref 4–5.4)
SODIUM SERPL-SCNC: 128 MMOL/L (ref 136–145)
WBC # BLD AUTO: 12 K/UL (ref 3.9–12.7)

## 2024-04-16 PROCEDURE — 25000242 PHARM REV CODE 250 ALT 637 W/ HCPCS

## 2024-04-16 PROCEDURE — 83735 ASSAY OF MAGNESIUM: CPT

## 2024-04-16 PROCEDURE — 99900026 HC AIRWAY MAINTENANCE (STAT)

## 2024-04-16 PROCEDURE — 99232 SBSQ HOSP IP/OBS MODERATE 35: CPT | Mod: ,,, | Performed by: NURSE PRACTITIONER

## 2024-04-16 PROCEDURE — 25000003 PHARM REV CODE 250

## 2024-04-16 PROCEDURE — 63600175 PHARM REV CODE 636 W HCPCS

## 2024-04-16 PROCEDURE — 99900035 HC TECH TIME PER 15 MIN (STAT)

## 2024-04-16 PROCEDURE — 84100 ASSAY OF PHOSPHORUS: CPT

## 2024-04-16 PROCEDURE — 25000003 PHARM REV CODE 250: Performed by: STUDENT IN AN ORGANIZED HEALTH CARE EDUCATION/TRAINING PROGRAM

## 2024-04-16 PROCEDURE — 99233 SBSQ HOSP IP/OBS HIGH 50: CPT | Mod: ,,, | Performed by: PHYSICIAN ASSISTANT

## 2024-04-16 PROCEDURE — 27000221 HC OXYGEN, UP TO 24 HOURS

## 2024-04-16 PROCEDURE — 27200966 HC CLOSED SUCTION SYSTEM

## 2024-04-16 PROCEDURE — 20600001 HC STEP DOWN PRIVATE ROOM

## 2024-04-16 PROCEDURE — 27000207 HC ISOLATION

## 2024-04-16 PROCEDURE — 80048 BASIC METABOLIC PNL TOTAL CA: CPT

## 2024-04-16 PROCEDURE — 85025 COMPLETE CBC W/AUTO DIFF WBC: CPT

## 2024-04-16 PROCEDURE — 94761 N-INVAS EAR/PLS OXIMETRY MLT: CPT

## 2024-04-16 PROCEDURE — 36415 COLL VENOUS BLD VENIPUNCTURE: CPT

## 2024-04-16 PROCEDURE — 25000003 PHARM REV CODE 250: Performed by: NURSE PRACTITIONER

## 2024-04-16 RX ORDER — SEVELAMER CARBONATE FOR ORAL SUSPENSION 800 MG/1
0.8 POWDER, FOR SUSPENSION ORAL 3 TIMES DAILY
Status: DISCONTINUED | OUTPATIENT
Start: 2024-04-16 | End: 2024-05-11

## 2024-04-16 RX ORDER — SEVELAMER CARBONATE FOR ORAL SUSPENSION 800 MG/1
0.8 POWDER, FOR SUSPENSION ORAL
Status: DISCONTINUED | OUTPATIENT
Start: 2024-04-16 | End: 2024-04-16

## 2024-04-16 RX ORDER — SODIUM CHLORIDE 9 MG/ML
INJECTION, SOLUTION INTRAVENOUS ONCE
Status: COMPLETED | OUTPATIENT
Start: 2024-04-17 | End: 2024-04-17

## 2024-04-16 RX ADMIN — INSULIN ASPART 2 UNITS: 100 INJECTION, SOLUTION INTRAVENOUS; SUBCUTANEOUS at 01:04

## 2024-04-16 RX ADMIN — HEPARIN SODIUM 7500 UNITS: 5000 INJECTION INTRAVENOUS; SUBCUTANEOUS at 05:04

## 2024-04-16 RX ADMIN — INSULIN ASPART 3 UNITS: 100 INJECTION, SOLUTION INTRAVENOUS; SUBCUTANEOUS at 05:04

## 2024-04-16 RX ADMIN — SEVELAMER CARBONATE 0.8 G: 2400 POWDER, FOR SUSPENSION ORAL at 09:04

## 2024-04-16 RX ADMIN — INSULIN ASPART 3 UNITS: 100 INJECTION, SOLUTION INTRAVENOUS; SUBCUTANEOUS at 09:04

## 2024-04-16 RX ADMIN — Medication 500 MG: at 09:04

## 2024-04-16 RX ADMIN — INSULIN DETEMIR 27 UNITS: 100 INJECTION, SOLUTION SUBCUTANEOUS at 10:04

## 2024-04-16 RX ADMIN — INSULIN ASPART 3 UNITS: 100 INJECTION, SOLUTION INTRAVENOUS; SUBCUTANEOUS at 02:04

## 2024-04-16 RX ADMIN — PSYLLIUM HUSK 1 PACKET: 3.4 POWDER ORAL at 09:04

## 2024-04-16 RX ADMIN — HEPARIN SODIUM 7500 UNITS: 5000 INJECTION INTRAVENOUS; SUBCUTANEOUS at 10:04

## 2024-04-16 RX ADMIN — ASPIRIN 81 MG CHEWABLE TABLET 81 MG: 81 TABLET CHEWABLE at 09:04

## 2024-04-16 RX ADMIN — INSULIN ASPART 3 UNITS: 100 INJECTION, SOLUTION INTRAVENOUS; SUBCUTANEOUS at 01:04

## 2024-04-16 RX ADMIN — INSULIN DETEMIR 27 UNITS: 100 INJECTION, SOLUTION SUBCUTANEOUS at 09:04

## 2024-04-16 RX ADMIN — SEVELAMER CARBONATE 0.8 G: 0.8 POWDER, FOR SUSPENSION ORAL at 12:04

## 2024-04-16 RX ADMIN — SEVELAMER CARBONATE 2.4 G: 2400 POWDER, FOR SUSPENSION ORAL at 09:04

## 2024-04-16 RX ADMIN — PANTOPRAZOLE SODIUM 40 MG: 40 GRANULE, DELAYED RELEASE ORAL at 09:04

## 2024-04-16 RX ADMIN — ZINC SULFATE 220 MG (50 MG) CAPSULE 220 MG: CAPSULE at 09:04

## 2024-04-16 NOTE — SUBJECTIVE & OBJECTIVE
Interval History: NAEON. Patient remains afebrile, tachycardic, but otherwise hemodynamically stable. Remains saturating well on trach collar and still appears euvolemic. Tolerated volume removal well during dialysis today. ID with recommendation to continue Vanc while awaiting sensitivities/susceptibilities and de-escalation from merrem to ertapenem given urine culture results. Plan to return home pending outpatient antibiotic plan.    Review of Systems   Reason unable to perform ROS: nonverbal.     Objective:     Vital Signs (Most Recent):  Temp: 99 °F (37.2 °C) (04/15/24 1544)  Pulse: 102 (04/15/24 1545)  Resp: 14 (04/15/24 1545)  BP: 138/78 (04/15/24 1544)  SpO2: 100 % (04/15/24 1545) Vital Signs (24h Range):  Temp:  [97.5 °F (36.4 °C)-99 °F (37.2 °C)] 99 °F (37.2 °C)  Pulse:  [] 102  Resp:  [14-24] 14  SpO2:  [96 %-100 %] 100 %  BP: ()/(63-84) 138/78     Weight: 122.5 kg (270 lb)  Body mass index is 42.29 kg/m².    Intake/Output Summary (Last 24 hours) at 4/15/2024 1911  Last data filed at 4/15/2024 1245  Gross per 24 hour   Intake 1050 ml   Output 2000 ml   Net -950 ml           Physical Exam  Constitutional:       General: She is not in acute distress.     Appearance: She is not ill-appearing.      Comments: Trach in place, no purulence    HENT:      Head: Normocephalic and atraumatic.   Eyes:      General: No scleral icterus.     Extraocular Movements: Extraocular movements intact.   Cardiovascular:      Rate and Rhythm: Normal rate and regular rhythm.   Pulmonary:      Effort: Pulmonary effort is normal. No respiratory distress.   Abdominal:      General: Abdomen is flat. There is no distension.      Palpations: Abdomen is soft.      Tenderness: There is no abdominal tenderness.      Comments: Peg tube in place, no purulence noted   Genitourinary:     Comments: Has large bandage over groin area. Pictures in chart  Musculoskeletal:      Right lower leg: No edema.      Left lower leg: No edema.     "  Comments: Moves BUE spontaneously   Skin:     General: Skin is warm and dry.      Findings: Wound present.   Neurological:      Mental Status: She is alert.      Comments: Nonverbal   Psychiatric:      Comments: Nonverbal             Significant Labs: All pertinent labs within the past 24 hours have been reviewed.  Blood Culture:   No results for input(s): "LABBLOO" in the last 48 hours.    CBC:   Recent Labs   Lab 04/14/24 0241 04/15/24  0309   WBC 11.24 11.46   HGB 9.5* 9.2*   HCT 30.6* 30.1*    248     CMP:   Recent Labs   Lab 04/14/24 0241 04/15/24  0309   * 131*   K 3.5 3.4*   CL 94* 93*   CO2 22* 20*   * 120*   BUN 62* 77*   CREATININE 6.6* 7.6*   CALCIUM 10.4 10.4   PROT 8.4  --    ALBUMIN 3.1*  --    BILITOT 0.3  --    ALKPHOS 109  --    AST 20  --    ALT 26  --    ANIONGAP 16 18*       Significant Imaging: I have reviewed all pertinent imaging results/findings within the past 24 hours.  "

## 2024-04-16 NOTE — ASSESSMENT & PLAN NOTE
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 She most recently completed a course of meropenem for pseudomonas SSTI of right groin wound on 3/21/24. She is admitted from [facility] for AMS and a leukocytosis.     CT abdomen/pelvis concerning for cystitis as well as a UA (obtained via straight cath) concerning for a UTI.  Urine culture grew ESBL Proteus mirabilis. Blood cultures from admit are positive for Staph epidermidis (different susceptibilities). Repeat blood cultures 4/13 are NGTD. Suspect this is representative of contamination. Patient has been on Vancomycin and Meropenem. Afebrile. Leukocytosis resolved. Mentation returning to baseline per primary team.     Recommendations  Discontinue Vancomycin  Continue Carbapenem therapy for a total of 5 days for ESBL Proteus mirabilis cystitis   Continue local wound care and rest of care per primary team  Discussed plan with ID staff. ID will sign off

## 2024-04-16 NOTE — ASSESSMENT & PLAN NOTE
53 y.o. Black or  Female ESRD-HD M-W-F at [facility] presents to ED on 4/10/2024 with UTI.    Of note, the patient was initiated on RRT in February 2024 after being admitted with ALVARADO 2/2 iATN due to septic shock. Perm cath placed on 2/29/24. She was transferred to [facility] on 3/12. Deemed ESRD at [facility].  Nephrology consulted for inpatient ESRD-HD management    Assessment:   - Dialysis for metabolic clearance and volume management will be provided tomorrow AM.  - Continue to monitor intake and output  - Please avoid gadolinium, fleets, phos-based laxatives, NSAIDs  - Dialysis thrice weekly unless more urgent indications arise. Will evaluate RRT requirements Daily.    Anemia of ESRD   Recent Labs   Lab 04/14/24  0241 04/15/24  0309 04/16/24  0418   WBC 11.24 11.46 12.00   HGB 9.5* 9.2* 9.4*   HCT 30.6* 30.1* 30.4*    248 209       Lab Results   Component Value Date    FESATURATED 10 (L) 03/15/2024    FERRITIN 414 (H) 03/15/2024       - Goal in ESRD is Hgb of 10-11.   - No EPO    Mineral Bone Disease in ESRD   Lab Results   Component Value Date    CALCIUM 9.7 04/16/2024    ALBUMIN 3.1 (L) 04/14/2024    CAION 1.12 02/21/2024    PHOS 2.7 04/16/2024       - F/U PO4, Mg, Calcium. And albumin levels daily.   - Renal diet with protein intake goal 1.5 g/kg/d with 1 L fluid restriction   - Phos 2.7 - will adjust dose

## 2024-04-16 NOTE — ASSESSMENT & PLAN NOTE
Creatine stable for now. BMP reviewed- noted Estimated Creatinine Clearance: 11.6 mL/min (A) (based on SCr of 7.6 mg/dL (H)). according to latest data. Based on current GFR, CKD stage is end stage.  Monitor UOP and serial BMP and adjust therapy as needed. Renally dose meds. Avoid nephrotoxic medications and procedures.    Pt MWF HD, underwent on 4/10, complete session  Plan  Nephrology consult  Monitor fluid status

## 2024-04-16 NOTE — PLAN OF CARE
[facility] - Telemetry Stepdown  Initial Discharge Assessment       Primary Care Provider: No, Primary Doctor    Admission Diagnosis: Tachycardia [R00.0]  Acute cystitis with hematuria [N30.01]  Chest pain [R07.9]  Altered mental status, unspecified altered mental status type [R41.82]  Sepsis, due to unspecified organism, unspecified whether acute organ dysfunction present [A41.9]  Chronic wound [T14.8XXA]    Admission Date: 4/10/2024  Expected Discharge Date: 4/16/2024    Transition of Care Barriers: Transportation, Mobility, DIalysis placement issues, Nursing Home rejection    Payor: MEDICAID / Plan: [employer] / Product Type: Managed Medicaid /     Extended Emergency Contact Information  Primary Emergency Contact: ZAC NEWMAN  Mobile Phone: [phone]  Relation: Daughter  Secondary Emergency Contact: Osvaldo Gallegos  Mobile Phone: [phone]  Relation: Significant other    Discharge Plan A: Long-term acute care facility (LTAC)  Discharge Plan B: Long-[facility] acute care facility ([facility])      [id number]  [place], [place] [address] [place] [address] [id number]  Phone: [phone] Fax: [phone]    Initial Assessment (most recent)       Adult Discharge Assessment - 04/15/24 1200          Discharge Assessment    Assessment Type Discharge Planning Assessment     Confirmed/corrected address, phone number and insurance Yes     Confirmed Demographics Correct on Facesheet     Source of Information family     If unable to respond/provide information was family/caregiver contacted? Yes     Contact Name/Number manuel Green, [phone].     Reason For Admission Acute cystitis with hematuria     People in Home significant other     Facility Arrived From: [facility]     Do you expect to return to your current living situation? Yes     Do you have help at home or someone to help you manage your care at  home? Yes     Who are your caregiver(s) and their phone number(s)? manuel Green, [phone].     Current cognitive status: Unable to Assess     Walking or Climbing Stairs Difficulty yes     Walking or Climbing Stairs ambulation difficulty, dependent;stair climbing difficulty, dependent;transferring difficulty, dependent     Mobility Management Dependant     Dressing/Bathing bathing difficulty, dependent;dressing difficulty, dependent     Dressing/Bathing Management Dependant     Home Accessibility wheelchair accessible     Home Layout Able to live on 1st floor     Equipment Currently Used at Home none     Readmission within 30 days? Yes     Patient currently being followed by outpatient case management? No     Do you currently have service(s) that help you manage your care at home? No     Do you take prescription medications? Yes     Do you have prescription coverage? Yes     Coverage Medicaid - [employer]     Do you have any problems affording any of your prescribed medications? No     Is the patient taking medications as prescribed? yes     Who is going to help you get home at discharge? manuel Green, [phone].     How do you get to doctors appointments? health plan transportation     Are you on dialysis? Yes     Dialysis Name and Scheduled days New diagnosis     Do you take coumadin? No     Discharge Plan A Long-term acute care facility (LTAC)     Discharge Plan B Long-term acute care facility (LTAC)     DME Needed Upon Discharge  other (see comments)   TBD    Discharge Plan discussed with: Adult children     Name(s) and Number(s) manuel Green, [phone].     Transition of Care Barriers Transportation;Mobility;DIalysis placement issues;Nursing Home rejection        OTHER    Name(s) of People in Home Osvaldo Gallegos, boyfriend, [phone]                 Patient lives in [facility] with no stairs. Prior to stroke, patient was independent. Admitted from [facility]  - LTAC.      Dunia Burton RN  Weekend  - [facility]  Spectralink: [phone]

## 2024-04-16 NOTE — SUBJECTIVE & OBJECTIVE
Interval History: NAEON.  On vanc & erta per ID.    Review of Systems  Objective:     Vital Signs (Most Recent):  Temp: 99.1 °F (37.3 °C) (04/16/24 1125)  Pulse: 110 (04/16/24 1137)  Resp: (!) 32 (04/16/24 1137)  BP: 120/75 (04/16/24 1125)  SpO2: 100 % (04/16/24 1137) Vital Signs (24h Range):  Temp:  [98.6 °F (37 °C)-99.3 °F (37.4 °C)] 99.1 °F (37.3 °C)  Pulse:  [101-111] 110  Resp:  [14-32] 32  SpO2:  [98 %-100 %] 100 %  BP: (102-138)/(68-85) 120/75     Weight: 122.5 kg (270 lb)  Body mass index is 42.29 kg/m².  No intake or output data in the 24 hours ending 04/16/24 1249      Physical Exam  Vitals and nursing note reviewed.   Constitutional:       General: She is not in acute distress.     Appearance: She is not ill-appearing, toxic-appearing or diaphoretic.      Comments: Trach in place, no purulence    HENT:      Head: Normocephalic and atraumatic.      Right Ear: External ear normal.      Left Ear: External ear normal.      Mouth/Throat:      Mouth: Mucous membranes are moist.      Pharynx: No oropharyngeal exudate.   Eyes:      General: No scleral icterus.     Extraocular Movements: Extraocular movements intact.      Pupils: Pupils are equal, round, and reactive to light.   Cardiovascular:      Rate and Rhythm: Normal rate and regular rhythm.      Pulses: Normal pulses.      Heart sounds: Normal heart sounds. No murmur heard.  Pulmonary:      Effort: Pulmonary effort is normal. No respiratory distress.      Breath sounds: Normal breath sounds. No wheezing or rales.   Abdominal:      General: Abdomen is flat. Bowel sounds are normal. There is no distension.      Palpations: Abdomen is soft. There is no mass.      Tenderness: There is no abdominal tenderness.      Comments: Peg tube in place, no purulence noted   Genitourinary:     Comments: Has large bandage over groin area. Pictures in chart  Musculoskeletal:         General: No swelling or tenderness. Normal range of motion.      Cervical back: Normal range  of motion and neck supple.      Right lower leg: No edema.      Left lower leg: No edema.      Comments: Moves BUE spontaneously   Skin:     General: Skin is warm and dry.      Capillary Refill: Capillary refill takes less than 2 seconds.      Findings: Wound present.   Neurological:      General: No focal deficit present.      Mental Status: She is alert and oriented to person, place, and time. Mental status is at baseline.      Comments: Nonverbal   Psychiatric:         Mood and Affect: Mood normal.         Behavior: Behavior normal.      Comments: Nonverbal             Significant Labs: All pertinent labs within the past 24 hours have been reviewed.    Significant Imaging: I have reviewed all pertinent imaging results/findings within the past 24 hours.

## 2024-04-16 NOTE — PROGRESS NOTES
Pharmacist Renal Dose Adjustment Note    Sarah Saravia is a 53 y.o. female being treated with the medication ertapenem    Patient Data:    Vital Signs (Most Recent):  Temp: 98.7 °F (37.1 °C) (04/16/24 0726)  Pulse: 104 (04/16/24 0726)  Resp: 20 (04/16/24 0407)  BP: 124/81 (04/16/24 0726)  SpO2: 99 % (04/16/24 0726) Vital Signs (72h Range):  Temp:  [97.5 °F (36.4 °C)-99.4 °F (37.4 °C)]   Pulse:  []   Resp:  [14-27]   BP: ()/(63-87)   SpO2:  [94 %-100 %]      Recent Labs   Lab 04/14/24  0241 04/15/24  0309 04/16/24 0418   CREATININE 6.6* 7.6* 5.0*     Serum creatinine: 5 mg/dL (H) 04/16/24 0418  Estimated creatinine clearance: 17.7 mL/min (A)    Change ertapenem to 500 mg IVPB Q24H for ESRD on HD    Pharmacist's Name: Brett Ramirez  Pharmacist's Extension: [id number]

## 2024-04-16 NOTE — SUBJECTIVE & OBJECTIVE
Interval History:   Blood cultures on admission + Staph epidermidis (4/4 bottles - drawn at same time) - different susceptibilities   Urine cx + ESBL Proteus mirabilis  On Vanc/Meropenem. Afebrile. Leukocytosis resolved. Repeat blood cx NGTD.      Review of Systems   Unable to perform ROS: Patient nonverbal     Objective:     Vital Signs (Most Recent):  Temp: 99.1 °F (37.3 °C) (04/16/24 1125)  Pulse: 110 (04/16/24 1137)  Resp: (!) 32 (04/16/24 1137)  BP: 120/75 (04/16/24 1125)  SpO2: 100 % (04/16/24 1137) Vital Signs (24h Range):  Temp:  [98.6 °F (37 °C)-99.3 °F (37.4 °C)] 99.1 °F (37.3 °C)  Pulse:  [102-111] 110  Resp:  [14-32] 32  SpO2:  [98 %-100 %] 100 %  BP: (114-138)/(68-85) 120/75     Weight: 122.5 kg (270 lb)  Body mass index is 42.29 kg/m².    Estimated Creatinine Clearance: 17.7 mL/min (A) (based on SCr of 5 mg/dL (H)).     Physical Exam  Vitals and nursing note reviewed.   Constitutional:       Appearance: She is well-developed.      Comments: Somnolent on exam   HENT:      Head: Normocephalic and atraumatic.   Eyes:      Pupils: Pupils are equal, round, and reactive to light.   Neck:      Comments: Trach site   Cardiovascular:      Rate and Rhythm: Normal rate and regular rhythm.      Heart sounds: Normal heart sounds.   Pulmonary:      Effort: Pulmonary effort is normal. No respiratory distress.      Breath sounds: Normal breath sounds. No wheezing, rhonchi or rales.   Abdominal:      General: Bowel sounds are normal. There is no distension.      Palpations: Abdomen is soft. There is no mass.      Tenderness: There is no abdominal tenderness.   Musculoskeletal:      Cervical back: Normal range of motion and neck supple.   Skin:     General: Skin is warm and dry.      Coloration: Skin is not pale.      Findings: No erythema.      Comments: Right groin wound c/d/I. No purulent drainage    Tunneled line catheter c/d/I        Neurological:      Mental Status: She is alert.          Significant Labs: All  pertinent labs within the past 24 hours have been reviewed.    Significant Imaging: I have reviewed all pertinent imaging results/findings within the past 24 hours.

## 2024-04-16 NOTE — ASSESSMENT & PLAN NOTE
Pt currently taking detemir 25 BID, and moderate sliding scale aspart. Glucose elevated in the 300s on arrival. Last A1c 10    Plan  Aspart 3u q4h  Levemir 27u BID  MDSSI  POCT glucose q4h, please give remaining sliding scale requirement if above the scheduled 3u  Ordered current regimen with elevation in glucose

## 2024-04-16 NOTE — ASSESSMENT & PLAN NOTE
Pt presenting with AMS and worsening lethargy. Pt is non-verbal at baseline, does not follow commands, but was less responsive than normal. Pt found to have a fever. Urinary source suspected based off of urine and CT abd/pelvis. Pt has many prior resistant bacterial infections including urinary sources. Has prior with sensitivity to zosyn and has also improved off ED dose of vanc/zosyn. Lactic elevated a 3.8->3.49 prior to completion of fluid bolus 500ml    Plan  S/p vanc/merrem, now transitioned to vanc/ertapenem (on 04/15) per ID  ID consulted  F/u blood cultures (4/4 w/ staph epi)  UC w/ proteus; sensitive to erta  Daily cbc  Contact precautions

## 2024-04-16 NOTE — PLAN OF CARE
Problem: Skin Injury Risk Increased  Goal: Skin Health and Integrity  Outcome: Ongoing, Not Progressing     Problem: Adult Inpatient Plan of Care  Goal: Plan of Care Review  Outcome: Ongoing, Progressing  Flowsheets (Taken 4/16/2024 0421)  Plan of Care Reviewed With: patient  Goal: Patient-Specific Goal (Individualized)  Outcome: Ongoing, Progressing  Goal: Absence of Hospital-Acquired Illness or Injury  Outcome: Ongoing, Progressing  Intervention: Identify and Manage Fall Risk  Flowsheets (Taken 4/16/2024 0421)  Safety Promotion/Fall Prevention:   assistive device/personal item within reach   bed alarm set   side rails raised x 2  Intervention: Prevent Skin Injury  Flowsheets (Taken 4/16/2024 0421)  Body Position: turned  Goal: Optimal Comfort and Wellbeing  Outcome: Ongoing, Progressing  Intervention: Provide Person-Centered Care  Flowsheets (Taken 4/16/2024 0421)  Trust Relationship/Rapport: other (see comments)  Goal: Readiness for Transition of Care  Outcome: Ongoing, Progressing

## 2024-04-16 NOTE — SUBJECTIVE & OBJECTIVE
Interval History:   HD completed yesterday. Net UF 1 L. No distress on exam.     Review of patient's allergies indicates:  No Known Allergies  Current Facility-Administered Medications   Medication Dose Route Frequency Provider Last Rate Last Admin    ascorbic acid (vitamin C) tablet 500 mg  500 mg Per G Tube BID Kristopher Tyler DO   500 mg at 04/16/24 0919    aspirin chewable tablet 81 mg  81 mg Per G Tube Daily Adi Aguirre MD   81 mg at 04/16/24 0920    dextrose 10 % infusion   Intravenous Continuous PRN Adi Aguirre MD        dextrose 10% bolus 125 mL 125 mL  12.5 g Intravenous PRN Kristopher Tyler DO        dextrose 10% bolus 250 mL 250 mL  25 g Intravenous PRN Kristopher Tyler DO        epoetin merry injection 6,100 Units  50 Units/kg Intravenous Every Mon, Wed, Fri Shwetha Gregory DNP, FNP-C   6,100 Units at 04/15/24 1102    ertapenem (INVANZ) 500 mg in sodium chloride 0.9 % 100 mL IVPB (MB+)  500 mg Intravenous Q24H Hola iLriano MD        glucagon (human recombinant) injection 1 mg  1 mg Intramuscular PRN Kristopher Tyler DO        glucose chewable tablet 16 g  16 g Oral PRN Kristopher Tyler DO        glucose chewable tablet 24 g  24 g Oral PRN Kristopher Tyler DO        heparin (porcine) injection 1,000 Units  1,000 Units Intra-Catheter PRN Shwetha Gregory DNP, FNP-C   1,000 Units at 04/15/24 1230    heparin (porcine) injection 7,500 Units  7,500 Units Subcutaneous Q8H Kristopher Tyler DO   7,500 Units at 04/16/24 0528    insulin aspart U-100 pen 0-10 Units  0-10 Units Subcutaneous Q4H PRN Adi Aguirre MD   2 Units at 04/14/24 1302    insulin aspart U-100 pen 3 Units  3 Units Subcutaneous Q4H Cash Luther MD   3 Units at 04/16/24 0926    insulin detemir U-100 (Levemir) pen 27 Units  27 Units Subcutaneous BID Adi Aguirre MD   27 Units at 04/16/24 0921    naloxone 0.4 mg/mL injection 0.02 mg  0.02 mg Intravenous PRN Kristopher Tyler DO        pantoprazole suspension 40 mg  40 mg Per G Tube Daily  Kristopher Tyler DO   40 mg at 04/16/24 0919    psyllium husk (aspartame) 3.4 gram packet 1 packet  1 packet Per G Tube Daily Kristopher Tyler DO   1 packet at 04/16/24 0920    sevelamer carbonate 2.4 gram powder 2.4 g  2.4 g Per G Tube TID Hola Liriano MD   2.4 g at 04/16/24 0920    sodium chloride 0.9% flush 10 mL  10 mL Intravenous Q12H PRN Kristopher Tyler DO        vancomycin - pharmacy to dose   Intravenous pharmacy to manage frequency Gloria Davies PA-C        zinc sulfate capsule 220 mg  220 mg Per G Tube Daily Kristopher Tyler DO   220 mg at 04/16/24 0919       Objective:     Vital Signs (Most Recent):  Temp: 98.7 °F (37.1 °C) (04/16/24 0726)  Pulse: 104 (04/16/24 0726)  Resp: 20 (04/16/24 0407)  BP: 124/81 (04/16/24 0726)  SpO2: 99 % (04/16/24 0726) Vital Signs (24h Range):  Temp:  [97.5 °F (36.4 °C)-99.3 °F (37.4 °C)] 98.7 °F (37.1 °C)  Pulse:  [100-111] 104  Resp:  [14-24] 20  SpO2:  [98 %-100 %] 99 %  BP: ()/(63-85) 124/81     Weight: 122.5 kg (270 lb) (04/15/24 1320)  Body mass index is 42.29 kg/m².  Body surface area is 2.41 meters squared.    I/O last 3 completed shifts:  In: 1050 [I.V.:300; Other:700; NG/GT:50]  Out: 2000 [Other:2000]     Physical Exam  Constitutional:       General: She is not in acute distress.     Appearance: She is ill-appearing.      Comments: Trach in place, no purulence    HENT:      Head: Normocephalic and atraumatic.   Eyes:      General: No scleral icterus.     Extraocular Movements: Extraocular movements intact.   Cardiovascular:      Rate and Rhythm: Normal rate and regular rhythm.   Pulmonary:      Effort: Pulmonary effort is normal. No respiratory distress.   Abdominal:      General: Abdomen is flat. There is no distension.      Palpations: Abdomen is soft.      Tenderness: There is no abdominal tenderness.      Comments: Peg tube in place, no purulence noted   Musculoskeletal:      Right lower leg: No edema.      Left lower leg: No edema.   Skin:     General:  Skin is warm and dry.      Findings: Wound present.   Neurological:      Mental Status: She is alert.      Comments: Nonverbal   Psychiatric:      Comments: Nonverbal          Significant Labs:  CBC:   Recent Labs   Lab 04/16/24 0418   WBC 12.00   RBC 3.30*   HGB 9.4*   HCT 30.4*      MCV 92   MCH 28.5   MCHC 30.9*     CMP:   Recent Labs   Lab 04/14/24  0241 04/15/24  0309 04/16/24 0418   *   < > 132*   CALCIUM 10.4   < > 9.7   ALBUMIN 3.1*  --   --    PROT 8.4  --   --    *   < > 128*   K 3.5   < > 3.7   CO2 22*   < > 23   CL 94*   < > 92*   BUN 62*   < > 46*   CREATININE 6.6*   < > 5.0*   ALKPHOS 109  --   --    ALT 26  --   --    AST 20  --   --    BILITOT 0.3  --   --     < > = values in this interval not displayed.     All labs within the past 24 hours have been reviewed.

## 2024-04-16 NOTE — RESPIRATORY THERAPY
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  Clinically Significant Surgical Hx: tracheostomy    24 Hours Vitals Range:  Temp:  [98.6 °F (37 °C)-99.3 °F (37.4 °C)]   Pulse:  [102-111]   Resp:  [18-32]   BP: (114-124)/(68-85)   SpO2:  [98 %-100 %]     Labs:    Recent Labs     24  0241 04/15/24  0309 24  0418   * 131* 128*   K 3.5 3.4* 3.7   CL 94* 93* 92*   CO2 22* 20* 23   BUN 62* 77* 46*   CREATININE 6.6* 7.6* 5.0*   * 120* 132*   PHOS 3.7 3.7 2.7   MG 2.3 2.4 2.1        No results for input(s): "PH", "PCO2", "PO2", "HCO3", "POCSATURATED", "BE" in the last 72 hours.    ASSESSMENT/INTERVENTIONS  Pt on 5L 21% trach collar. Shiley size 6 uncuffed trach in place. All supplies in room. Extra trachs at bedside - 4 and 6, both cuffed.       Last VS   Temp: 99.1 °F (37.3 °C) (1546)  Pulse: 106 (1546)  Resp: 26 (1546)  BP: 122/84 (1546)  SpO2: 100 % (1546)      Extra trachs at bedside: 4 and 6, both cuffed.  Level of Consciousness: Level of Consciousness (AVPU): responds to voice  Respiratory Effort: Respiratory Effort: Normal, Unlabored Expansion/Accessory Muscle Usage: Expansion/Accessory Muscles/Retractions: expansion symmetric  All Lung Field Breath Sounds: All Lung Fields Breath Sounds: Anterior:, Lateral:, clear  JOSE Breath Sounds: diminished  O2 Device/Concentration: 5L 21% TC.  Surgical airway: Yes, Type: Shiley Size: 6, uncuffed  Ambu at bedside:       Active Orders   Respiratory Care    Oxygen Continuous     Frequency: Continuous     Number of Occurrences: Until Specified     Order Questions:      Device type: " Low flow      Device: Trach Collar      FiO2%: 28%      Titrate O2 per Oxygen Titration Protocol: Yes      To maintain SpO2 goal of: >= 90%      Notify MD of: Inability to achieve desired SpO2; Sudden change in patient status and requires 20% increase in FiO2; Patient requires >60% FiO2    Pulse Oximetry Continuous     Frequency: Continuous     Number of Occurrences: Until Specified    Routine tracheostomy care     Frequency: BID     Number of Occurrences: Until Specified       RECOMMENDATIONS    We recommend: RRT Recs: Continue POC per primary team.      FOLLOW-UP    Please call back the Rapid Response RT, Javier June RRT at x [phone] for any questions or concerns.

## 2024-04-16 NOTE — ASSESSMENT & PLAN NOTE
CT head and EEG without acute process; likely delirium secondary to infection and prolonged hospitalization  - neurology consulted  - delirium precautions  - restart home seroquel  - continue home vpa     Pt presenting with AMS and worsening lethargy. Pt is non-verbal at baseline, does not follow commands, but was less responsive than normal. Pt found to have a fever. Urinary source suspected based off of urine and CT abd/pelvis. Pt has many prior resistant bacterial infections including urinary sources. Has prior with sensitivity to zosyn and has also improved off ED dose of vanc/zosyn. Lactic elevated a 3.8->3.49 prior to completion of fluid bolus 500ml    Plan  Vanc/merrem transitioned to vanc/ertapenem  ID consult  F/u blood cultures (4/4 w/ staph epi)  UC w/ proteus; sensitive to erta  Daily cbc  Contact precautions

## 2024-04-16 NOTE — PROGRESS NOTES
[facility] - Telemetry [facility] Medicine  Progress Note    Patient Name: Sarah Saravia  MRN: [id number]  Patient Class: IP- Inpatient   Admission Date: 4/10/2024  Length of Stay: 4 days  Attending Physician: Hola Liriano MD  Primary Care Provider: Deanna, Primary Doctor        Subjective:     Principal Problem:Acute cystitis with hematuria        HPI:  53 yof with 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 History was given from patient's daughter. She was undergoing dialysis today and noticed she was lethargic, less alert than usual self. Pt completed dialysis and still not acting herself. EMS was called, fever of a 100.  On chart review, she did have an episode of large volume emesis around 1700.  Per EMS, she had a slightly elevated temp 100.0°F, glucose 300s.     In the ED: UA 2+ leuks, >100 WBC, many bacteria, WBC 17, CT abd/pelvis concerning for cystitis. Given vanc/zosyn    Overview/Hospital Course:  Patient was admitted to Hospital Medicine service for medical management and evaluation of urosepsis. Patient was continued on vanc/zosyn. Vascular Neurology consulted concerning mental status change with the recommendation to initiate ASA 81 QD and to obtain an MRI to r/o new stroke. Imaging pending. Nephrology was consulted for regularly scheduled dialysis. Afternoon of 4/12, patient was unable to tolerate filtration of volume during dialsis 2/2 hypotension. Patient received 500cc bolus and was transferred back to floor after finishing the session without volume removed. Will monitor for signs of volume overload. Tailoring insulin regimen while uptitrating tube feeds to goal rate. Staph Epi in all 4 bottles; ID with recommendation to continue Vanc while awaiting further speciation/sensitivities. Patient tolerated volume removal well in dialysis on 4/15.    Interval History: NAEON. Patient remains afebrile,  tachycardic, but otherwise hemodynamically stable. Remains saturating well on trach collar and still appears euvolemic. Tolerated volume removal well during dialysis today. ID with recommendation to continue Vanc while awaiting sensitivities/susceptibilities and de-escalation from merrem to ertapenem given urine culture results. Plan to return home pending outpatient antibiotic plan.    Review of Systems   Reason unable to perform ROS: nonverbal.     Objective:     Vital Signs (Most Recent):  Temp: 99 °F (37.2 °C) (04/15/24 1544)  Pulse: 102 (04/15/24 1545)  Resp: 14 (04/15/24 1545)  BP: 138/78 (04/15/24 1544)  SpO2: 100 % (04/15/24 1545) Vital Signs (24h Range):  Temp:  [97.5 °F (36.4 °C)-99 °F (37.2 °C)] 99 °F (37.2 °C)  Pulse:  [] 102  Resp:  [14-24] 14  SpO2:  [96 %-100 %] 100 %  BP: ()/(63-84) 138/78     Weight: 122.5 kg (270 lb)  Body mass index is 42.29 kg/m².    Intake/Output Summary (Last 24 hours) at 4/15/2024 1911  Last data filed at 4/15/2024 1245  Gross per 24 hour   Intake 1050 ml   Output 2000 ml   Net -950 ml           Physical Exam  Constitutional:       General: She is not in acute distress.     Appearance: She is not ill-appearing.      Comments: Trach in place, no purulence    HENT:      Head: Normocephalic and atraumatic.   Eyes:      General: No scleral icterus.     Extraocular Movements: Extraocular movements intact.   Cardiovascular:      Rate and Rhythm: Normal rate and regular rhythm.   Pulmonary:      Effort: Pulmonary effort is normal. No respiratory distress.   Abdominal:      General: Abdomen is flat. There is no distension.      Palpations: Abdomen is soft.      Tenderness: There is no abdominal tenderness.      Comments: Peg tube in place, no purulence noted   Genitourinary:     Comments: Has large bandage over groin area. Pictures in chart  Musculoskeletal:      Right lower leg: No edema.      Left lower leg: No edema.      Comments: Moves BUE spontaneously   Skin:      "General: Skin is warm and dry.      Findings: Wound present.   Neurological:      Mental Status: She is alert.      Comments: Nonverbal   Psychiatric:      Comments: Nonverbal             Significant Labs: All pertinent labs within the past 24 hours have been reviewed.  Blood Culture:   No results for input(s): "LABBLOO" in the last 48 hours.    CBC:   Recent Labs   Lab 04/14/24  0241 04/15/24  0309   WBC 11.24 11.46   HGB 9.5* 9.2*   HCT 30.6* 30.1*    248     CMP:   Recent Labs   Lab 04/14/24  0241 04/15/24  0309   * 131*   K 3.5 3.4*   CL 94* 93*   CO2 22* 20*   * 120*   BUN 62* 77*   CREATININE 6.6* 7.6*   CALCIUM 10.4 10.4   PROT 8.4  --    ALBUMIN 3.1*  --    BILITOT 0.3  --    ALKPHOS 109  --    AST 20  --    ALT 26  --    ANIONGAP 16 18*       Significant Imaging: I have reviewed all pertinent imaging results/findings within the past 24 hours.    Assessment/Plan:      * Acute cystitis with hematuria  Pt presenting with AMS and worsening lethargy. Pt is non-verbal at baseline, does not follow commands, but was less responsive than normal. Pt found to have a fever. Urinary source suspected based off of urine and CT abd/pelvis. Pt has many prior resistant bacterial infections including urinary sources. Has prior with sensitivity to zosyn and has also improved off ED dose of vanc/zosyn. Lactic elevated a 3.8->3.49 prior to completion of fluid bolus 500ml    Plan  Vanc/merrem transitioned to vanc/ertapenem  ID consult  F/u blood cultures (4/4 w/ staph epi)  UC w/ proteus; sensitive to erta  Daily cbc  Contact precautions        Atelectasis  I have personally reviewed Chest X-ray. Patient with atelectasis on interpretation. Likely Non-Obstructive atelectasis secondary to immobility. Signs and symptoms include Hypoxemia Will begin treatment with upright positioning.    Hypermagnesemia  Present on arrival in the setting of ESRD    -daily cmp, mg, phos  -nephro consulted for " dialysis      Prolonged QT interval  Qtc 526, limit Qtc prolonging drugs as able      Spastic hemiplegia of right dominant side as late effect of cerebrovascular disease   This patient has Chronic right hemiplegia due to stroke. Physical therapy services has not been scheduled. Continue all standard measures for pressure injury prevention and consult wound care for any wounds (chronic or acute).    ESRD (end stage renal disease) on dialysis  Creatine stable for now. BMP reviewed- noted Estimated Creatinine Clearance: 11.6 mL/min (A) (based on SCr of 7.6 mg/dL (H)). according to latest data. Based on current GFR, CKD stage is end stage.  Monitor UOP and serial BMP and adjust therapy as needed. Renally dose meds. Avoid nephrotoxic medications and procedures.    Pt MWF HD, underwent on 4/10, complete session  Plan  Nephrology consult  Monitor fluid status    Acute encephalopathy  Pt is non-verbal and does not follow commands at baseline. Vascular Neurology consulted given acute change from baseline. MRI with concern for evolution of prior strokes with noted differential of T2 hyperintensities including vasculitis vs demyelination. Discussed with Vascular Neurology; low concern for ongoing vasculitis/demyelination, likely represents typical evolution of prior infarcts.    Plan  - STAT head imaging for concern of new change in mental status/focal deficit    Type 2 diabetes mellitus with hyperglycemia, with long-term current use of insulin  Pt currently taking detemir 25 BID, and moderate sliding scale aspart. Glucose elevated in the 300s on arrival. Last A1c 10    Plan  Aspart 3u q4h  Levemir 27u BID  MDSSI  POCT glucose q4h, please give remaining sliding scale requirement if above the scheduled 3u  Ordered current regimen with elevation in glucose      Ros's gangrene  Pt experienced severe episode of ros's gangrene in January of 2024. Pt underwent extensive course, currently still healing from this, but no  longer has wound vac. Pt's wound currently covered with bandage. Picture in media tabs    Plan  Wound care        VTE Risk Mitigation (From admission, onward)           Ordered     heparin (porcine) injection 1,000 Units  As needed (PRN)         04/12/24 0951     heparin (porcine) injection 7,500 Units  Every 8 hours         04/11/24 0252                    Discharge Planning   ZEKE: 4/16/2024     Code Status: Full Code   Is the patient medically ready for discharge?: No    Reason for patient still in hospital (select all that apply): Patient trending condition, Treatment, Consult recommendations, and Pending disposition  Discharge Plan A: Long-term acute care facility (LTAC)                  Adi Aguirre MD  Department of Hospital Medicine   [facility] - Telemetry Stepdown

## 2024-04-16 NOTE — PROGRESS NOTES
[facility] - Telemetry Stepdown  Infectious Disease  Progress Note    Patient Name: Sarah Saravia  MRN: [id number]  Admission Date: 4/10/2024  Length of Stay: 5 days  Attending Physician: Hola Liriano MD  Primary Care Provider: Deanna, Primary Doctor    Isolation Status: Contact  Assessment/Plan:      Renal/  * Acute cystitis with hematuria  53 year old female with history of morbid obesity, DMII who was admitted in January for Ayaz's gangrene requiring multiple surgical debridements for necrotizing infection. 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 She most recently completed a course of meropenem for pseudomonas SSTI of right groin wound on 3/21/24. She is admitted from [facility] for AMS and a leukocytosis.     CT abdomen/pelvis concerning for cystitis as well as a UA (obtained via straight cath) concerning for a UTI.  Urine culture grew ESBL Proteus mirabilis. Blood cultures from admit are positive for Staph epidermidis (different susceptibilities). Repeat blood cultures 4/13 are NGTD. Suspect this is representative of contamination. Patient has been on Vancomycin and Meropenem. Afebrile. Leukocytosis resolved. Mentation returning to baseline per primary team.     Recommendations  Discontinue Vancomycin  Continue Carbapenem therapy for a total of 5 days for ESBL Proteus mirabilis cystitis   Continue local wound care and rest of care per primary team  Discussed plan with ID staff. ID will sign off          Thank you for the consult. Please secure chat for any questions.  Negra Muro PA-C      Subjective:     Principal Problem:Acute cystitis with hematuria    HPI: 53-year-old female with a history of CVA now nonverbal with the tracheostomy and PEG, uncontrolled diabetes, Ayaz's gangrene in January 2024, end-stage renal disease on dialysis who resides at [facility]  extended care and was transferred for fever and altered mental status. Reportedly patient was less responsive than her baseline had an episode of emesis, in the ED she had a CT abdomen/pelvis concerning for cystitis as well as a UA (obtained via straight cath) concerning for a UTI. She was initially started on vancomycin and Zosyn later broadened to meropenem. On presentation her labs were notable for leukocytosis of 17, as well as a lactic acidosis of 3.9 that has improved to 2.4 with the administration of 1 L of IV fluids, we are being cautious with fluid repletion given her end-stage renal disease and oliguric status. Infectious diseases consulted for her history of multiple drug-resistant organisms.       She completed meropenem on 3/21/24 for right groin wound infection. She is followed by wound care closely. Wuond appear stable without active signs of infection. Tmax 100, WBC 17K on admission. She is currently on vancomycin and meroepnem. Blood cultures NGTD. CXR negaitve for focal consolidations.   Interval History:   Blood cultures on admission + Staph epidermidis (4/4 bottles - drawn at same time) - different susceptibilities   Urine cx + ESBL Proteus mirabilis  On Vanc/Meropenem. Afebrile. Leukocytosis resolved. Repeat blood cx NGTD.      Review of Systems   Unable to perform ROS: Patient nonverbal     Objective:     Vital Signs (Most Recent):  Temp: 99.1 °F (37.3 °C) (04/16/24 1125)  Pulse: 110 (04/16/24 1137)  Resp: (!) 32 (04/16/24 1137)  BP: 120/75 (04/16/24 1125)  SpO2: 100 % (04/16/24 1137) Vital Signs (24h Range):  Temp:  [98.6 °F (37 °C)-99.3 °F (37.4 °C)] 99.1 °F (37.3 °C)  Pulse:  [102-111] 110  Resp:  [14-32] 32  SpO2:  [98 %-100 %] 100 %  BP: (114-138)/(68-85) 120/75     Weight: 122.5 kg (270 lb)  Body mass index is 42.29 kg/m².    Estimated Creatinine Clearance: 17.7 mL/min (A) (based on SCr of 5 mg/dL (H)).     Physical Exam  Vitals and nursing note reviewed.   Constitutional:        Appearance: She is well-developed.      Comments: Somnolent on exam   HENT:      Head: Normocephalic and atraumatic.   Eyes:      Pupils: Pupils are equal, round, and reactive to light.   Neck:      Comments: Trach site   Cardiovascular:      Rate and Rhythm: Normal rate and regular rhythm.      Heart sounds: Normal heart sounds.   Pulmonary:      Effort: Pulmonary effort is normal. No respiratory distress.      Breath sounds: Normal breath sounds. No wheezing, rhonchi or rales.   Abdominal:      General: Bowel sounds are normal. There is no distension.      Palpations: Abdomen is soft. There is no mass.      Tenderness: There is no abdominal tenderness.   Musculoskeletal:      Cervical back: Normal range of motion and neck supple.   Skin:     General: Skin is warm and dry.      Coloration: Skin is not pale.      Findings: No erythema.      Comments: Right groin wound c/d/I. No purulent drainage    Tunneled line catheter c/d/I        Neurological:      Mental Status: She is alert.          Significant Labs: All pertinent labs within the past 24 hours have been reviewed.    Significant Imaging: I have reviewed all pertinent imaging results/findings within the past 24 hours.

## 2024-04-16 NOTE — PROGRESS NOTES
VANCOMYCIN DOSING BY PHARMACY DISCONTINUATION NOTE    Sarah Saravia is a 53 y.o. female who had been consulted for vancomycin dosing.    The pharmacy consult for vancomycin dosing has been discontinued.     Vancomycin Dosing by Pharmacy Consult will sign-off. Please reconsult if necessary. Thank you for allowing us to participate in this patient's care.     Lea Andrews, Pharm.D.,[facility]  Phone: [phone]

## 2024-04-16 NOTE — PROGRESS NOTES
[facility] - Telemetry Stepdown  Nephrology  Progress Note    Patient Name: Sarah Saravia  MRN: [id number]  Admission Date: 4/10/2024  Hospital Length of Stay: 5 days  Attending Provider: Hola Liriano MD   Primary Care Physician: Deanna, Primary Doctor  Principal Problem:Acute cystitis with hematuria    Subjective:     Interval History:   HD completed yesterday. Net UF 1 L. No distress on exam.     Review of patient's allergies indicates:  No Known Allergies  Current Facility-Administered Medications   Medication Dose Route Frequency Provider Last Rate Last Admin    ascorbic acid (vitamin C) tablet 500 mg  500 mg Per G Tube BID Kristopher Tyler DO   500 mg at 04/16/24 0919    aspirin chewable tablet 81 mg  81 mg Per G Tube Daily Adi Aguirre MD   81 mg at 04/16/24 0920    dextrose 10 % infusion   Intravenous Continuous PRN Adi Aguirre MD        dextrose 10% bolus 125 mL 125 mL  12.5 g Intravenous PRN Kristopher Tyler DO        dextrose 10% bolus 250 mL 250 mL  25 g Intravenous PRN Kristopher Tyler DO        epoetin merry injection 6,100 Units  50 Units/kg Intravenous Every Mon, Wed, Fri Shwetha Gregory DNP, FNP-C   6,100 Units at 04/15/24 1102    ertapenem (INVANZ) 500 mg in sodium chloride 0.9 % 100 mL IVPB (MB+)  500 mg Intravenous Q24H Hola Liriano MD        glucagon (human recombinant) injection 1 mg  1 mg Intramuscular PRN Kristopher Tyler DO        glucose chewable tablet 16 g  16 g Oral PRN Kristopher Tyler DO        glucose chewable tablet 24 g  24 g Oral PRN Kristopher Tyler DO        heparin (porcine) injection 1,000 Units  1,000 Units Intra-Catheter PRN Shwetha Gregory DNP, FNP-C   1,000 Units at 04/15/24 1230    heparin (porcine) injection 7,500 Units  7,500 Units Subcutaneous Q8H Kristopher Tyler DO   7,500 Units at 04/16/24 0528    insulin aspart U-100 pen 0-10 Units  0-10 Units Subcutaneous Q4H PRN Adi Aguirre MD   2 Units at 04/14/24 1302    insulin aspart U-100 pen 3 Units  3 Units Subcutaneous  Q4H Cash Luther MD   3 Units at 04/16/24 0926    insulin detemir U-100 (Levemir) pen 27 Units  27 Units Subcutaneous BID Adi Aguirre MD   27 Units at 04/16/24 0921    naloxone 0.4 mg/mL injection 0.02 mg  0.02 mg Intravenous PRN Kristopher Tyler DO        pantoprazole suspension 40 mg  40 mg Per G Tube Daily Kristopher Tyler DO   40 mg at 04/16/24 0919    psyllium husk (aspartame) 3.4 gram packet 1 packet  1 packet Per G Tube Daily Kristopher Tyler DO   1 packet at 04/16/24 0920    sevelamer carbonate 2.4 gram powder 2.4 g  2.4 g Per G Tube TID Hola Liriano MD   2.4 g at 04/16/24 0920    sodium chloride 0.9% flush 10 mL  10 mL Intravenous Q12H PRN Kristopher Tyler DO        vancomycin - pharmacy to dose   Intravenous pharmacy to manage frequency Gloria Davies PA-C        zinc sulfate capsule 220 mg  220 mg Per G Tube Daily Kristopher Tyler DO   220 mg at 04/16/24 0919       Objective:     Vital Signs (Most Recent):  Temp: 98.7 °F (37.1 °C) (04/16/24 0726)  Pulse: 104 (04/16/24 0726)  Resp: 20 (04/16/24 0407)  BP: 124/81 (04/16/24 0726)  SpO2: 99 % (04/16/24 0726) Vital Signs (24h Range):  Temp:  [97.5 °F (36.4 °C)-99.3 °F (37.4 °C)] 98.7 °F (37.1 °C)  Pulse:  [100-111] 104  Resp:  [14-24] 20  SpO2:  [98 %-100 %] 99 %  BP: ()/(63-85) 124/81     Weight: 122.5 kg (270 lb) (04/15/24 1320)  Body mass index is 42.29 kg/m².  Body surface area is 2.41 meters squared.    I/O last 3 completed shifts:  In: 1050 [I.V.:300; Other:700; NG/GT:50]  Out: 2000 [Other:2000]     Physical Exam  Constitutional:       General: She is not in acute distress.     Appearance: She is ill-appearing.      Comments: Trach in place, no purulence    HENT:      Head: Normocephalic and atraumatic.   Eyes:      General: No scleral icterus.     Extraocular Movements: Extraocular movements intact.   Cardiovascular:      Rate and Rhythm: Normal rate and regular rhythm.   Pulmonary:      Effort: Pulmonary effort is normal. No respiratory  distress.   Abdominal:      General: Abdomen is flat. There is no distension.      Palpations: Abdomen is soft.      Tenderness: There is no abdominal tenderness.      Comments: Peg tube in place, no purulence noted   Musculoskeletal:      Right lower leg: No edema.      Left lower leg: No edema.   Skin:     General: Skin is warm and dry.      Findings: Wound present.   Neurological:      Mental Status: She is alert.      Comments: Nonverbal   Psychiatric:      Comments: Nonverbal          Significant Labs:  CBC:   Recent Labs   Lab 04/16/24  0418   WBC 12.00   RBC 3.30*   HGB 9.4*   HCT 30.4*      MCV 92   MCH 28.5   MCHC 30.9*     CMP:   Recent Labs   Lab 04/14/24  0241 04/15/24  0309 04/16/24  0418   *   < > 132*   CALCIUM 10.4   < > 9.7   ALBUMIN 3.1*  --   --    PROT 8.4  --   --    *   < > 128*   K 3.5   < > 3.7   CO2 22*   < > 23   CL 94*   < > 92*   BUN 62*   < > 46*   CREATININE 6.6*   < > 5.0*   ALKPHOS 109  --   --    ALT 26  --   --    AST 20  --   --    BILITOT 0.3  --   --     < > = values in this interval not displayed.     All labs within the past 24 hours have been reviewed.   Assessment/Plan:     Renal/  * Acute cystitis with hematuria  - defer to primary     ESRD (end stage renal disease) on dialysis  53 y.o. Black or  Female ESRD-HD M-W-F at [facility] presents to ED on 4/10/2024 with UTI.    Of note, the patient was initiated on RRT in February 2024 after being admitted with ALVARADO 2/2 iATN due to septic shock. Perm cath placed on 2/29/24. She was transferred to [facility] on 3/12. Deemed ESRD at [facility].  Nephrology consulted for inpatient ESRD-HD management    Assessment:   - Dialysis for metabolic clearance and volume management will be provided tomorrow AM.  - Continue to monitor intake and output  - Please avoid gadolinium, fleets, phos-based laxatives, NSAIDs  - Dialysis thrice weekly unless more urgent indications arise. Will evaluate RRT requirements Daily.    Anemia  of ESRD   Recent Labs   Lab 04/14/24  0241 04/15/24  0309 04/16/24  0418   WBC 11.24 11.46 12.00   HGB 9.5* 9.2* 9.4*   HCT 30.6* 30.1* 30.4*    248 209       Lab Results   Component Value Date    FESATURATED 10 (L) 03/15/2024    FERRITIN 414 (H) 03/15/2024       - Goal in ESRD is Hgb of 10-11.   - No EPO    Mineral Bone Disease in ESRD   Lab Results   Component Value Date    CALCIUM 9.7 04/16/2024    ALBUMIN 3.1 (L) 04/14/2024    CAION 1.12 02/21/2024    PHOS 2.7 04/16/2024       - F/U PO4, Mg, Calcium. And albumin levels daily.   - Renal diet with protein intake goal 1.5 g/kg/d with 1 L fluid restriction   - Phos 2.7 - will adjust dose     Ayaz's gangrene  - Per chart         Thank you for your consult. I will follow-up with patient. Please contact us if you have any additional questions.    Shwetha Gregory, POONAM, FNP-C  Nephrology  [facility] - Tele[facility] Stepdown

## 2024-04-16 NOTE — PROGRESS NOTES
[facility] Tele[facility] Medicine  Progress Note    Patient Name: Sarah Saravia  MRN: [id number]  Patient Class: IP- Inpatient   Admission Date: 4/10/2024  Length of Stay: 5 days  Attending Physician: Hola Liriano MD  Primary Care Provider: Deanna, Primary Doctor    Subjective:     Principal Problem:Acute cystitis with hematuria    HPI:  53 yof with 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 History was given from patient's daughter. She was undergoing dialysis today and noticed she was lethargic, less alert than usual self. Pt completed dialysis and still not acting herself. EMS was called, fever of a 100.  On chart review, she did have an episode of large volume emesis around 1700.  Per EMS, she had a slightly elevated temp 100.0°F, glucose 300s.     In the ED: UA 2+ leuks, >100 WBC, many bacteria, WBC 17, CT abd/pelvis concerning for cystitis. Given vanc/zosyn    Overview/Hospital Course:  Patient was admitted to Hospital Medicine service for medical management and evaluation of urosepsis. Patient was continued on vanc/zosyn. Vascular Neurology consulted concerning mental status change with the recommendation to initiate ASA 81 QD and to obtain an MRI to r/o new stroke. Imaging pending. Nephrology was consulted for regularly scheduled dialysis. Afternoon of 4/12, patient was unable to tolerate filtration of volume during dialsis 2/2 hypotension. Patient received 500cc bolus and was transferred back to floor after finishing the session without volume removed. Will monitor for signs of volume overload. Tailoring insulin regimen while uptitrating tube feeds to goal rate. Staph Epi in all 4 bottles; ID with recommendation to continue Vanc & de-escalate meropenem to ertapenem on 04/15 while awaiting further speciation/sensitivities. Patient tolerated volume removal well in dialysis on 4/15.    Interval  History: NAEON.  On vanc & erta per ID.    Review of Systems  Objective:     Vital Signs (Most Recent):  Temp: 99.1 °F (37.3 °C) (04/16/24 1125)  Pulse: 110 (04/16/24 1137)  Resp: (!) 32 (04/16/24 1137)  BP: 120/75 (04/16/24 1125)  SpO2: 100 % (04/16/24 1137) Vital Signs (24h Range):  Temp:  [98.6 °F (37 °C)-99.3 °F (37.4 °C)] 99.1 °F (37.3 °C)  Pulse:  [101-111] 110  Resp:  [14-32] 32  SpO2:  [98 %-100 %] 100 %  BP: (102-138)/(68-85) 120/75     Weight: 122.5 kg (270 lb)  Body mass index is 42.29 kg/m².  No intake or output data in the 24 hours ending 04/16/24 1249      Physical Exam  Vitals and nursing note reviewed.   Constitutional:       General: She is not in acute distress.     Appearance: She is not ill-appearing, toxic-appearing or diaphoretic.      Comments: Trach in place, no purulence    HENT:      Head: Normocephalic and atraumatic.      Right Ear: External ear normal.      Left Ear: External ear normal.      Mouth/Throat:      Mouth: Mucous membranes are moist.      Pharynx: No oropharyngeal exudate.   Eyes:      General: No scleral icterus.     Extraocular Movements: Extraocular movements intact.      Pupils: Pupils are equal, round, and reactive to light.   Cardiovascular:      Rate and Rhythm: Normal rate and regular rhythm.      Pulses: Normal pulses.      Heart sounds: Normal heart sounds. No murmur heard.  Pulmonary:      Effort: Pulmonary effort is normal. No respiratory distress.      Breath sounds: Normal breath sounds. No wheezing or rales.   Abdominal:      General: Abdomen is flat. Bowel sounds are normal. There is no distension.      Palpations: Abdomen is soft. There is no mass.      Tenderness: There is no abdominal tenderness.      Comments: Peg tube in place, no purulence noted   Genitourinary:     Comments: Has large bandage over groin area. Pictures in chart  Musculoskeletal:         General: No swelling or tenderness. Normal range of motion.      Cervical back: Normal range of motion  and neck supple.      Right lower leg: No edema.      Left lower leg: No edema.      Comments: Moves BUE spontaneously   Skin:     General: Skin is warm and dry.      Capillary Refill: Capillary refill takes less than 2 seconds.      Findings: Wound present.   Neurological:      General: No focal deficit present.      Mental Status: She is alert and oriented to person, place, and time. Mental status is at baseline.      Comments: Nonverbal   Psychiatric:         Mood and Affect: Mood normal.         Behavior: Behavior normal.      Comments: Nonverbal             Significant Labs: All pertinent labs within the past 24 hours have been reviewed.    Significant Imaging: I have reviewed all pertinent imaging results/findings within the past 24 hours.    Assessment/Plan:      * Acute cystitis with hematuria  Pt presenting with AMS and worsening lethargy. Pt is non-verbal at baseline, does not follow commands, but was less responsive than normal. Pt found to have a fever. Urinary source suspected based off of urine and CT abd/pelvis. Pt has many prior resistant bacterial infections including urinary sources. Has prior with sensitivity to zosyn and has also improved off ED dose of vanc/zosyn. Lactic elevated a 3.8->3.49 prior to completion of fluid bolus 500ml    Plan  S/p vanc/merrem, now transitioned to vanc/ertapenem (on 04/15) per ID  ID consulted  F/u blood cultures (4/4 w/ staph epi)  UC w/ proteus; sensitive to erta  Daily cbc  Contact precautions    Atelectasis  I have personally reviewed Chest X-ray. Patient with atelectasis on interpretation. Likely Non-Obstructive atelectasis secondary to immobility. Signs and symptoms include Hypoxemia Will begin treatment with upright positioning.    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 Physical therapy services has not been scheduled. Continue all standard measures for pressure injury prevention and consult wound care for any wounds (chronic or acute).    ESRD (end stage renal disease) on dialysis  Creatine stable for now. BMP reviewed- noted Estimated Creatinine Clearance: 11.6 mL/min (A) (based on SCr of 7.6 mg/dL (H)). according to latest data. Based on current GFR, CKD stage is end stage.  Monitor UOP and serial BMP and adjust therapy as needed. Renally dose meds. Avoid nephrotoxic medications and procedures.    Pt MWF HD, underwent on 4/10, complete session  Plan  Nephrology consult  Monitor fluid status    Acute encephalopathy  Pt is non-verbal and does not follow commands at baseline. Vascular Neurology consulted given acute change from baseline. MRI with concern for evolution of prior strokes with noted differential of T2 hyperintensities including vasculitis vs demyelination. Discussed with Vascular Neurology; low concern for ongoing vasculitis/demyelination, likely represents typical evolution of prior infarcts.    Plan  - STAT head imaging for concern of new change in mental status/focal deficit    Type 2 diabetes mellitus with hyperglycemia, with long-term current use of insulin  Pt currently taking detemir 25 BID, and moderate sliding scale aspart. Glucose elevated in the 300s on arrival. Last A1c 10    Plan  Aspart 3u q4h  Levemir 27u BID  MDSSI  POCT glucose q4h, please give remaining sliding scale requirement if above the scheduled 3u  Ordered current regimen with elevation in glucose      Ros's gangrene  Pt experienced severe episode of ros's gangrene in January of 2024. Pt underwent extensive course, currently still healing from this, but no longer has wound vac. Pt's wound currently covered with bandage. Picture in media tabs    Plan  Wound care        VTE Risk  Mitigation (From admission, onward)           Ordered     heparin (porcine) injection 1,000 Units  As needed (PRN)         04/12/24 0951     heparin (porcine) injection 7,500 Units  Every 8 hours         04/11/24 0252                  Discharge Planning   ZEKE: 4/16/2024     Code Status: Full Code   Is the patient medically ready for discharge?: No    Reason for patient still in hospital (select all that apply): Treatment  Discharge Plan A: Long-term acute care facility (LTAC)        Osito Montes De Oca MD  Department of Hospital Medicine   [facility] - Telemetry Stepdown

## 2024-04-17 ENCOUNTER — OFFICE VISIT (OUTPATIENT)
Dept: DIALYSIS | Facility: HOSPITAL | Age: 54
DRG: 689 | End: 2024-04-17
Attending: EMERGENCY MEDICINE
Payer: MEDICAID

## 2024-04-17 LAB
ANION GAP SERPL CALC-SCNC: 15 MMOL/L (ref 8–16)
BASOPHILS # BLD AUTO: 0.06 K/UL (ref 0–0.2)
BASOPHILS NFR BLD: 0.5 % (ref 0–1.9)
BUN SERPL-MCNC: 61 MG/DL (ref 6–20)
CALCIUM SERPL-MCNC: 10 MG/DL (ref 8.7–10.5)
CHLORIDE SERPL-SCNC: 92 MMOL/L (ref 95–110)
CO2 SERPL-SCNC: 23 MMOL/L (ref 23–29)
CREAT SERPL-MCNC: 6 MG/DL (ref 0.5–1.4)
DIFFERENTIAL METHOD BLD: ABNORMAL
EOSINOPHIL # BLD AUTO: 0.7 K/UL (ref 0–0.5)
EOSINOPHIL NFR BLD: 5.6 % (ref 0–8)
ERYTHROCYTE [DISTWIDTH] IN BLOOD BY AUTOMATED COUNT: 16 % (ref 11.5–14.5)
EST. GFR  (NO RACE VARIABLE): 7.8 ML/MIN/1.73 M^2
GLUCOSE SERPL-MCNC: 141 MG/DL (ref 70–110)
HCT VFR BLD AUTO: 29.3 % (ref 37–48.5)
HGB BLD-MCNC: 9.2 G/DL (ref 12–16)
IMM GRANULOCYTES # BLD AUTO: 0.13 K/UL (ref 0–0.04)
IMM GRANULOCYTES NFR BLD AUTO: 1 % (ref 0–0.5)
LYMPHOCYTES # BLD AUTO: 2.5 K/UL (ref 1–4.8)
LYMPHOCYTES NFR BLD: 19.7 % (ref 18–48)
MAGNESIUM SERPL-MCNC: 2.2 MG/DL (ref 1.6–2.6)
MCH RBC QN AUTO: 28.4 PG (ref 27–31)
MCHC RBC AUTO-ENTMCNC: 31.4 G/DL (ref 32–36)
MCV RBC AUTO: 90 FL (ref 82–98)
MONOCYTES # BLD AUTO: 1.3 K/UL (ref 0.3–1)
MONOCYTES NFR BLD: 10.3 % (ref 4–15)
NEUTROPHILS # BLD AUTO: 8 K/UL (ref 1.8–7.7)
NEUTROPHILS NFR BLD: 62.9 % (ref 38–73)
NRBC BLD-RTO: 0 /100 WBC
PHOSPHATE SERPL-MCNC: 4.2 MG/DL (ref 2.7–4.5)
PLATELET # BLD AUTO: 231 K/UL (ref 150–450)
PMV BLD AUTO: 10.3 FL (ref 9.2–12.9)
POCT GLUCOSE: 118 MG/DL (ref 70–110)
POCT GLUCOSE: 123 MG/DL (ref 70–110)
POCT GLUCOSE: 127 MG/DL (ref 70–110)
POCT GLUCOSE: 144 MG/DL (ref 70–110)
POCT GLUCOSE: 144 MG/DL (ref 70–110)
POTASSIUM SERPL-SCNC: 4.1 MMOL/L (ref 3.5–5.1)
RBC # BLD AUTO: 3.24 M/UL (ref 4–5.4)
SODIUM SERPL-SCNC: 130 MMOL/L (ref 136–145)
WBC # BLD AUTO: 12.68 K/UL (ref 3.9–12.7)

## 2024-04-17 PROCEDURE — 3066F NEPHROPATHY DOC TX: CPT | Mod: CPTII,,, | Performed by: NURSE PRACTITIONER

## 2024-04-17 PROCEDURE — 84100 ASSAY OF PHOSPHORUS: CPT

## 2024-04-17 PROCEDURE — 25000003 PHARM REV CODE 250: Performed by: NURSE PRACTITIONER

## 2024-04-17 PROCEDURE — 63600175 PHARM REV CODE 636 W HCPCS

## 2024-04-17 PROCEDURE — 85025 COMPLETE CBC W/AUTO DIFF WBC: CPT

## 2024-04-17 PROCEDURE — 3046F HEMOGLOBIN A1C LEVEL >9.0%: CPT | Mod: CPTII,,, | Performed by: NURSE PRACTITIONER

## 2024-04-17 PROCEDURE — 99900026 HC AIRWAY MAINTENANCE (STAT)

## 2024-04-17 PROCEDURE — 25000003 PHARM REV CODE 250: Performed by: STUDENT IN AN ORGANIZED HEALTH CARE EDUCATION/TRAINING PROGRAM

## 2024-04-17 PROCEDURE — 99900035 HC TECH TIME PER 15 MIN (STAT)

## 2024-04-17 PROCEDURE — 63600175 PHARM REV CODE 636 W HCPCS: Performed by: NURSE PRACTITIONER

## 2024-04-17 PROCEDURE — 20600001 HC STEP DOWN PRIVATE ROOM

## 2024-04-17 PROCEDURE — 80100014 HC HEMODIALYSIS 1:1

## 2024-04-17 PROCEDURE — 27000207 HC ISOLATION

## 2024-04-17 PROCEDURE — 94761 N-INVAS EAR/PLS OXIMETRY MLT: CPT

## 2024-04-17 PROCEDURE — 90935 HEMODIALYSIS ONE EVALUATION: CPT | Mod: ,,, | Performed by: NURSE PRACTITIONER

## 2024-04-17 PROCEDURE — 36415 COLL VENOUS BLD VENIPUNCTURE: CPT

## 2024-04-17 PROCEDURE — 80048 BASIC METABOLIC PNL TOTAL CA: CPT

## 2024-04-17 PROCEDURE — 83735 ASSAY OF MAGNESIUM: CPT

## 2024-04-17 PROCEDURE — 25000003 PHARM REV CODE 250

## 2024-04-17 RX ORDER — INSULIN ASPART 100 [IU]/ML
0-10 INJECTION, SOLUTION INTRAVENOUS; SUBCUTANEOUS EVERY 6 HOURS PRN
Status: DISCONTINUED | OUTPATIENT
Start: 2024-04-17 | End: 2024-05-12

## 2024-04-17 RX ORDER — HEPARIN SODIUM 1000 [USP'U]/ML
3000 INJECTION, SOLUTION INTRAVENOUS; SUBCUTANEOUS
Status: DISCONTINUED | OUTPATIENT
Start: 2024-04-17 | End: 2024-07-08 | Stop reason: HOSPADM

## 2024-04-17 RX ORDER — INSULIN ASPART 100 [IU]/ML
4 INJECTION, SOLUTION INTRAVENOUS; SUBCUTANEOUS EVERY 6 HOURS
Status: DISCONTINUED | OUTPATIENT
Start: 2024-04-17 | End: 2024-05-25

## 2024-04-17 RX ORDER — INSULIN ASPART 100 [IU]/ML
4 INJECTION, SOLUTION INTRAVENOUS; SUBCUTANEOUS EVERY 6 HOURS
Start: 2024-04-17 | End: 2024-06-06 | Stop reason: HOSPADM

## 2024-04-17 RX ORDER — SEVELAMER CARBONATE FOR ORAL SUSPENSION 800 MG/1
0.8 POWDER, FOR SUSPENSION ORAL 3 TIMES DAILY
Start: 2024-04-17 | End: 2024-06-06 | Stop reason: HOSPADM

## 2024-04-17 RX ORDER — AMLODIPINE BESYLATE 10 MG/1
10 TABLET ORAL DAILY
Start: 2024-04-17 | End: 2024-06-06 | Stop reason: HOSPADM

## 2024-04-17 RX ORDER — CARVEDILOL 25 MG/1
25 TABLET ORAL 2 TIMES DAILY
Start: 2024-04-17 | End: 2024-05-16 | Stop reason: HOSPADM

## 2024-04-17 RX ORDER — NAPROXEN SODIUM 220 MG/1
81 TABLET, FILM COATED ORAL DAILY
Start: 2024-04-17 | End: 2025-04-17

## 2024-04-17 RX ORDER — ONDANSETRON HYDROCHLORIDE 2 MG/ML
4 INJECTION, SOLUTION INTRAVENOUS EVERY 6 HOURS PRN
Status: DISCONTINUED | OUTPATIENT
Start: 2024-04-17 | End: 2024-05-20

## 2024-04-17 RX ORDER — POLYETHYLENE GLYCOL 3350 17 G/17G
17 POWDER, FOR SOLUTION ORAL DAILY
COMMUNITY
Start: 2024-04-17 | End: 2024-06-06 | Stop reason: HOSPADM

## 2024-04-17 RX ORDER — POLYETHYLENE GLYCOL 3350 17 G/17G
17 POWDER, FOR SOLUTION ORAL DAILY
Status: DISCONTINUED | OUTPATIENT
Start: 2024-04-17 | End: 2024-04-18

## 2024-04-17 RX ADMIN — HEPARIN SODIUM 7500 UNITS: 5000 INJECTION INTRAVENOUS; SUBCUTANEOUS at 09:04

## 2024-04-17 RX ADMIN — INSULIN ASPART 4 UNITS: 100 INJECTION, SOLUTION INTRAVENOUS; SUBCUTANEOUS at 11:04

## 2024-04-17 RX ADMIN — SODIUM CHLORIDE: 9 INJECTION, SOLUTION INTRAVENOUS at 09:04

## 2024-04-17 RX ADMIN — ERYTHROPOIETIN 6100 UNITS: 10000 INJECTION, SOLUTION INTRAVENOUS; SUBCUTANEOUS at 10:04

## 2024-04-17 RX ADMIN — HEPARIN SODIUM 7500 UNITS: 5000 INJECTION INTRAVENOUS; SUBCUTANEOUS at 05:04

## 2024-04-17 RX ADMIN — INSULIN ASPART 3 UNITS: 100 INJECTION, SOLUTION INTRAVENOUS; SUBCUTANEOUS at 02:04

## 2024-04-17 RX ADMIN — INSULIN DETEMIR 27 UNITS: 100 INJECTION, SOLUTION SUBCUTANEOUS at 09:04

## 2024-04-17 RX ADMIN — INSULIN ASPART 3 UNITS: 100 INJECTION, SOLUTION INTRAVENOUS; SUBCUTANEOUS at 05:04

## 2024-04-17 RX ADMIN — SEVELAMER CARBONATE 0.8 G: 2400 POWDER, FOR SUSPENSION ORAL at 09:04

## 2024-04-17 RX ADMIN — INSULIN ASPART 4 UNITS: 100 INJECTION, SOLUTION INTRAVENOUS; SUBCUTANEOUS at 05:04

## 2024-04-17 RX ADMIN — HEPARIN SODIUM 1000 UNITS: 1000 INJECTION, SOLUTION INTRAVENOUS; SUBCUTANEOUS at 01:04

## 2024-04-17 RX ADMIN — INSULIN DETEMIR 27 UNITS: 100 INJECTION, SOLUTION SUBCUTANEOUS at 10:04

## 2024-04-17 RX ADMIN — HEPARIN SODIUM 3000 UNITS: 1000 INJECTION, SOLUTION INTRAVENOUS; SUBCUTANEOUS at 09:04

## 2024-04-17 RX ADMIN — SEVELAMER CARBONATE 0.8 G: 2400 POWDER, FOR SUSPENSION ORAL at 05:04

## 2024-04-17 RX ADMIN — Medication 500 MG: at 09:04

## 2024-04-17 NOTE — ASSESSMENT & PLAN NOTE
Creatine stable for now. BMP reviewed- noted Estimated Creatinine Clearance: 14.7 mL/min (A) (based on SCr of 6 mg/dL (H)). according to latest data. Based on current GFR, CKD stage is end stage.  Monitor UOP and serial BMP and adjust therapy as needed. Renally dose meds. Avoid nephrotoxic medications and procedures.    Pt MWF HD, underwent on 4/10, complete session  Plan  Nephrology consult  Monitor fluid status

## 2024-04-17 NOTE — SUBJECTIVE & OBJECTIVE
Interval History: NAEON.  Afebrile, HD stable. Still with stable sinus tach. Completed abx. Tolerated dialysis with 1.5L removed. Patient's receiving LTACH without bed availability; pending discharge.    Review of Systems  Objective:     Vital Signs (Most Recent):  Temp: 98.4 °F (36.9 °C) (04/17/24 1315)  Pulse: 109 (04/17/24 1315)  Resp: 18 (04/17/24 1315)  BP: 105/71 (04/17/24 1315)  SpO2: 100 % (04/17/24 1315) Vital Signs (24h Range):  Temp:  [98.4 °F (36.9 °C)-99.6 °F (37.6 °C)] 98.4 °F (36.9 °C)  Pulse:  [103-113] 109  Resp:  [12-34] 18  SpO2:  [98 %-100 %] 100 %  BP: (101-132)/(61-85) 105/71     Weight: 122.5 kg (270 lb)  Body mass index is 42.29 kg/m².    Intake/Output Summary (Last 24 hours) at 4/17/2024 1416  Last data filed at 4/17/2024 1321  Gross per 24 hour   Intake 652.12 ml   Output 2150 ml   Net -1497.88 ml         Physical Exam  Vitals and nursing note reviewed.   Constitutional:       General: She is not in acute distress.     Appearance: She is not ill-appearing, toxic-appearing or diaphoretic.      Comments: Trach in place, no purulence    HENT:      Head: Normocephalic and atraumatic.      Right Ear: External ear normal.      Left Ear: External ear normal.      Mouth/Throat:      Mouth: Mucous membranes are moist.      Pharynx: No oropharyngeal exudate.   Eyes:      General: No scleral icterus.     Extraocular Movements: Extraocular movements intact.      Pupils: Pupils are equal, round, and reactive to light.   Cardiovascular:      Rate and Rhythm: Normal rate and regular rhythm.      Pulses: Normal pulses.      Heart sounds: Normal heart sounds. No murmur heard.  Pulmonary:      Effort: Pulmonary effort is normal. No respiratory distress.      Breath sounds: Normal breath sounds. No wheezing or rales.   Abdominal:      General: Abdomen is flat. Bowel sounds are normal. There is no distension.      Palpations: Abdomen is soft. There is no mass.      Tenderness: There is no abdominal tenderness.       Comments: Peg tube in place, no purulence noted   Genitourinary:     Comments: Has large bandage over groin area. Pictures in chart  Musculoskeletal:         General: No swelling or tenderness. Normal range of motion.      Cervical back: Normal range of motion and neck supple.      Right lower leg: No edema.      Left lower leg: No edema.      Comments: Moves BUE spontaneously   Skin:     General: Skin is warm and dry.      Capillary Refill: Capillary refill takes less than 2 seconds.      Findings: Wound present.   Neurological:      General: No focal deficit present.      Mental Status: She is alert and oriented to person, place, and time. Mental status is at baseline.      Comments: Nonverbal   Psychiatric:         Mood and Affect: Mood normal.         Behavior: Behavior normal.      Comments: Nonverbal             Significant Labs: All pertinent labs within the past 24 hours have been reviewed.    Significant Imaging: I have reviewed all pertinent imaging results/findings within the past 24 hours.

## 2024-04-17 NOTE — PROGRESS NOTES
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 History was given from patient's daughter. She was undergoing dialysis today and noticed she was lethargic, less alert than usual self. Pt completed dialysis and still not acting herself. EMS was called, fever of a 100.  On chart review, she did have an episode of large volume emesis around 1700.  Per EMS, she had a slightly elevated temp 100.0°F, glucose 300s.     In the ED: UA 2+ leuks, >100 WBC, many bacteria, WBC 17, CT abd/pelvis concerning for cystitis. Given vanc/zosyn    Overview/Hospital Course:  Patient was admitted to Hospital Medicine service for medical management and evaluation of urosepsis. Patient was continued on vanc/zosyn. Vascular Neurology consulted concerning mental status change with the recommendation to initiate ASA 81 QD and to obtain an MRI to r/o new stroke. Imaging pending. Nephrology was consulted for regularly scheduled dialysis. Afternoon of 4/12, patient was unable to tolerate filtration of volume during dialsis 2/2 hypotension. Patient received 500cc bolus and was transferred back to floor after finishing the session without volume removed. Will monitor for signs of volume overload. Tailoring insulin regimen while uptitrating tube feeds to goal rate. Staph Epi in all 4 bottles; ID with recommendation to continue Vanc & de-escalate meropenem to ertapenem on 04/15 through 4/16. Patient completed IV course of UTI coverage. Patient tolerated volume removal well in dialysis  throughout the rest of her hospital stay. Pending discharge to LTACH pending bed availability.    Interval History: NAEON.  Afebrile, HD stable. Still with stable sinus tach. Completed abx. Tolerated dialysis with 1.5L removed. Patient's receiving LTACH without bed availability; pending discharge.    Review of Systems  Objective:     Vital Signs (Most Recent):  Temp: 98.4 °F (36.9 °C) (04/17/24 1315)  Pulse: 109 (04/17/24 1315)  Resp: 18 (04/17/24 1315)  BP: 105/71 (04/17/24 1315)  SpO2: 100 % (04/17/24 1315) Vital Signs (24h Range):  Temp:  [98.4 °F (36.9 °C)-99.6 °F (37.6 °C)] 98.4 °F (36.9 °C)  Pulse:  [103-113] 109  Resp:  [12-34] 18  SpO2:  [98 %-100 %] 100 %  BP: (101-132)/(61-85) 105/71     Weight: 122.5 kg (270 lb)  Body mass index is 42.29 kg/m².    Intake/Output Summary (Last 24 hours) at 4/17/2024 1416  Last data filed at 4/17/2024 1321  Gross per 24 hour   Intake 652.12 ml   Output 2150 ml   Net -1497.88 ml         Physical Exam  Vitals and nursing note reviewed.   Constitutional:       General: She is not in acute distress.     Appearance: She is not ill-appearing, toxic-appearing or diaphoretic.      Comments: Trach in place, no purulence    HENT:      Head: Normocephalic and atraumatic.      Right Ear: External ear normal.      Left Ear: External ear normal.      Mouth/Throat:      Mouth: Mucous membranes are moist.      Pharynx: No oropharyngeal exudate.   Eyes:      General: No scleral icterus.     Extraocular Movements: Extraocular movements intact.      Pupils: Pupils are equal, round, and reactive to light.   Cardiovascular:      Rate and Rhythm: Normal rate and regular rhythm.      Pulses: Normal pulses.      Heart sounds: Normal heart sounds. No murmur heard.  Pulmonary:      Effort: Pulmonary effort is normal. No respiratory distress.      Breath sounds: Normal breath sounds. No wheezing or rales.   Abdominal:      General: Abdomen is flat. Bowel sounds are normal. There is no distension.       Palpations: Abdomen is soft. There is no mass.      Tenderness: There is no abdominal tenderness.      Comments: Peg tube in place, no purulence noted   Genitourinary:     Comments: Has large bandage over groin area. Pictures in chart  Musculoskeletal:         General: No swelling or tenderness. Normal range of motion.      Cervical back: Normal range of motion and neck supple.      Right lower leg: No edema.      Left lower leg: No edema.      Comments: Moves BUE spontaneously   Skin:     General: Skin is warm and dry.      Capillary Refill: Capillary refill takes less than 2 seconds.      Findings: Wound present.   Neurological:      General: No focal deficit present.      Mental Status: She is alert and oriented to person, place, and time. Mental status is at baseline.      Comments: Nonverbal   Psychiatric:         Mood and Affect: Mood normal.         Behavior: Behavior normal.      Comments: Nonverbal             Significant Labs: All pertinent labs within the past 24 hours have been reviewed.    Significant Imaging: I have reviewed all pertinent imaging results/findings within the past 24 hours.    Assessment/Plan:      * Acute cystitis with hematuria  Pt presenting with AMS and worsening lethargy. Pt is non-verbal at baseline, does not follow commands, but was less responsive than normal. Pt found to have a fever. Urinary source suspected based off of urine and CT abd/pelvis. Pt has many prior resistant bacterial infections including urinary sources. Has prior with sensitivity to zosyn and has also improved off ED dose of vanc/zosyn. Lactic elevated a 3.8->3.49 prior to completion of fluid bolus 500ml    Plan  S/p vanc/merrem, now transitioned to vanc/ertapenem (on 04/15) per ID. Course completed 4/16.  ID consulted  F/u blood cultures (4/4 w/ staph epi)  UC w/ proteus; sensitive to erta  Daily cbc  Contact precautions    Atelectasis  I have personally reviewed Chest X-ray. Patient with atelectasis on  interpretation. Likely Non-Obstructive atelectasis secondary to immobility. Signs and symptoms include Hypoxemia Will begin treatment with upright positioning.    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 Physical therapy services has not been scheduled. Continue all standard measures for pressure injury prevention and consult wound care for any wounds (chronic or acute).    ESRD (end stage renal disease) on dialysis  Creatine stable for now. BMP reviewed- noted Estimated Creatinine Clearance: 14.7 mL/min (A) (based on SCr of 6 mg/dL (H)). according to latest data. Based on current GFR, CKD stage is end stage.  Monitor UOP and serial BMP and adjust therapy as needed. Renally dose meds. Avoid nephrotoxic medications and procedures.    Pt MWF HD, underwent on 4/10, complete session  Plan  Nephrology consult  Monitor fluid status    Acute encephalopathy  Pt is non-verbal and does not follow commands at baseline. Vascular Neurology consulted given acute change from baseline. MRI with concern for evolution of prior strokes with noted differential of T2 hyperintensities including vasculitis vs demyelination. Discussed with Vascular Neurology; low concern for ongoing vasculitis/demyelination, likely represents typical evolution of prior infarcts.    Plan  - STAT head imaging for concern of new change in mental status/focal deficit    Type 2 diabetes mellitus with hyperglycemia, with long-term current use of insulin  Pt currently taking detemir 25 BID, and moderate sliding scale aspart. Glucose elevated in the 300s on arrival. 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 Pt underwent extensive course, currently still healing from this, but no longer has wound vac. Pt's wound currently covered with bandage. Picture in media tabs    Plan  Wound care        VTE Risk Mitigation (From admission, onward)           Ordered     heparin (porcine) injection 3,000 Units  As needed (PRN)         04/17/24 0825     heparin (porcine) injection 1,000 Units  As needed (PRN)         04/12/24 0951     heparin (porcine) injection 7,500 Units  Every 8 hours         04/11/24 0252                    Discharge Planning   ZEKE: 4/19/2024     Code Status: Full Code   Is the patient medically ready for discharge?: No    Reason for patient still in hospital (select all that apply): Patient trending condition, Treatment, Consult recommendations, and Pending disposition  Discharge Plan A: Long-term acute care facility (LTAC)                  Adi Aguirre MD  Department of Hospital Medicine   [facility] - Telemetry Stepdown

## 2024-04-17 NOTE — PLAN OF CARE
04/17/24 1157   Post-Acute Status   Post-Acute Authorization Placement   Post-Acute Placement Status Referrals Sent     1146 CM spoke with patient's daughter [facility] [phone] and inquired if her mom was discharging today. CM noified daughter she is currently does not have a  discharge in. Confirmed with daughter her mom was previously at [facility] and remains agreeable to have her resume care with [facility].       Patient/family provided list of facilities in-network with patient's payor plan. Providers that are owned, operated, or affiliated with [facility] are included on the list.     Notified that referral sent to below listed facilities from in-network list based on proximity to home/family support:   [facility]     Patient/family instructed to identify preference.    Preferred Facility: (if more than 1, listed in order of descending preference)  [facility]    If an additional preferred facility not listed above is identified, additional referral to be sent. If above facilities unable to accept, will send additional referrals to in-network providers.      1445 CM spoke with patient's daughter and notified that [facility] possibly won't have a bed until next week. Reviewed with daughter other LTAC [facility] that may can accept her mother. Daughter agreeable to CM sending out a referral to [facility].    1500 [facility]

## 2024-04-17 NOTE — ASSESSMENT & PLAN NOTE
Pt presenting with AMS and worsening lethargy. Pt is non-verbal at baseline, does not follow commands, but was less responsive than normal. Pt found to have a fever. Urinary source suspected based off of urine and CT abd/pelvis. Pt has many prior resistant bacterial infections including urinary sources. Has prior with sensitivity to zosyn and has also improved off ED dose of vanc/zosyn. Lactic elevated a 3.8->3.49 prior to completion of fluid bolus 500ml    Plan  S/p vanc/merrem, now transitioned to vanc/ertapenem (on 04/15) per ID. Course completed 4/16.  ID consulted  F/u blood cultures (4/4 w/ staph epi)  UC w/ proteus; sensitive to erta  Daily cbc  Contact precautions

## 2024-04-17 NOTE — ASSESSMENT & PLAN NOTE
Pt currently taking detemir 25 BID, and moderate sliding scale aspart. Glucose elevated in the 300s on arrival. Last A1c 10    Plan  Aspart 3u q4h  Levemir 27u BID  Anticipate discharge with above regimen given well controlled blood glucose while at goal TF rate  MDSSI  POCT glucose q4h, please give remaining sliding scale requirement if above the scheduled 3u  Ordered current regimen with elevation in glucose

## 2024-04-17 NOTE — PLAN OF CARE
"  Discharge Plan A and Plan B have been determined by review of patient's clinical status, future medical and therapeutic needs, and coverage/benefits for post-acute care in coordination with multidisciplinary team members.      1000 CM sent clinical packet and orders to [facility] in [facility].     1005 CM spoke with Mala in admission at LTAC [phone] to notify the patient would be potentially discharging today to resume care with LTAC.  Mala stated she would review information and call CM back.     1025 CM found out patient was [facility]. Referral sent in [facility] and secure chat sent to Kathe Johnson, regarding patient transferring back to [facility].   CM awaiting a response.     1030 Kathe responded in secure chat "Ok great thank you! We will work her up in the system but we do not have a bed available today. I will speak to admin and try to get an estimate on when we will have a bed available."    Medical team added to secure to chat with medical team     "

## 2024-04-17 NOTE — RESPIRATORY THERAPY
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  Clinically Significant Surgical Hx: tracheostomy    24 Hours Vitals Range:  Temp:  [98.8 °F (37.1 °C)-99.6 °F (37.6 °C)]   Pulse:  [102-113]   Resp:  [12-32]   BP: (111-132)/(64-85)   SpO2:  [98 %-100 %]     Labs:    Recent Labs     04/15/24  0309 24  0418 24  0634   * 128* 130*   K 3.4* 3.7 4.1   CL 93* 92* 92*   CO2 20* 23 23   BUN 77* 46* 61*   CREATININE 7.6* 5.0* 6.0*   * 132* 141*   PHOS 3.7 2.7 4.2   MG 2.4 2.1 2.2        No results for input(s): "PH", "PCO2", "PO2", "HCO3", "POCSATURATED", "BE" in the last 72 hours.    ASSESSMENT/INTERVENTIONS  All supplies visible at bedside.      Last VS   Temp: 98.8 °F (37.1 °C) (922)  Pulse: 106 ( 101)  Resp: 19 (922)  BP: 112/65 ( 1015)  SpO2: 100 % ( 101)      Extra trachs at bedside: 4 & 6 cuffed  Level of Consciousness: Level of Consciousness (AVPU): alert  Respiratory Effort: Respiratory Effort: Unlabored Expansion/Accessory Muscle Usage: Expansion/Accessory Muscles/Retractions: expansion symmetric  All Lung Field Breath Sounds: All Lung Fields Breath Sounds: Anterior:, Lateral:, diminished  JOSE Breath Sounds: diminished  O2 Device/Concentration: 5L 21% TC  Surgical airway: Yes, Type: Shiley Size: 6, uncuffed  Ambu at bedside:       Active Orders   Respiratory Care    Oxygen Continuous     Frequency: Continuous     Number of Occurrences: Until Specified     Order Questions:      Device type: Low flow      Device: Trach Collar      FiO2%: 28%      Titrate O2 per Oxygen Titration Protocol: Yes      To maintain SpO2 goal " of: >= 90%      Notify MD of: Inability to achieve desired SpO2; Sudden change in patient status and requires 20% increase in FiO2; Patient requires >60% FiO2    Pulse Oximetry Continuous     Frequency: Continuous     Number of Occurrences: Until Specified    Routine tracheostomy care     Frequency: BID     Number of Occurrences: Until Specified       RECOMMENDATIONS    We recommend: RRT Recs: Continue POC per primary team.      FOLLOW-UP    Please call back the Rapid Response RT, Nayeli Tiwari, RRT at x [phone] for any questions or concerns.

## 2024-04-17 NOTE — PLAN OF CARE
Problem: Infection  Goal: Absence of Infection Signs and Symptoms  Outcome: Ongoing, Progressing     Problem: Adult Inpatient Plan of Care  Goal: Plan of Care Review  Outcome: Ongoing, Progressing  Flowsheets (Taken 4/17/2024 0602)  Plan of Care Reviewed With: patient  Goal: Patient-Specific Goal (Individualized)  Outcome: Ongoing, Progressing  Goal: Absence of Hospital-Acquired Illness or Injury  Outcome: Ongoing, Progressing  Intervention: Identify and Manage Fall Risk  Flowsheets (Taken 4/17/2024 0602)  Safety Promotion/Fall Prevention:   assistive device/personal item within reach   bed alarm set   side rails raised x 2  Intervention: Prevent Skin Injury  Flowsheets (Taken 4/17/2024 0602)  Body Position: turned  Goal: Optimal Comfort and Wellbeing  Outcome: Ongoing, Progressing  Intervention: Monitor Pain and Promote Comfort  Flowsheets (Taken 4/17/2024 0602)  Pain Management Interventions:   pillow support provided   position adjusted  Goal: Readiness for Transition of Care  Outcome: Ongoing, Progressing

## 2024-04-17 NOTE — PROGRESS NOTES
Pt to [facility] via bed for dialysis. Contact precautions maintained. Tx started via RIJ tunnel CVC without complications. Pt on humidified room air. HR sinus tach.

## 2024-04-17 NOTE — PLAN OF CARE
[facility]    FACILITY TRANSFER ORDERS      Patient Name: Sarah Saravia  YOB: 1970    PCP: No, Primary Doctor   PCP Address: None  PCP Phone Number: None  PCP Fax: None    Encounter Date: 04/17/2024    Admit to: LTACH    Vital Signs:  Routine    Diagnoses:   Active Hospital Problems    Diagnosis  POA    *Acute cystitis with hematuria [N30.01]  Yes    Prolonged QT interval [R94.31]  Yes    Hypermagnesemia [E83.41]  Yes     POA, Mg 3.2  Daily chem       Atelectasis [J98.11]  Yes    ESRD (end stage renal disease) on dialysis [N18.6, Z99.2]  Not Applicable    Spastic hemiplegia of right dominant side as late effect of cerebrovascular disease [I69.951]  Not Applicable    Acute encephalopathy [G93.40]  Yes    Type 2 diabetes mellitus with hyperglycemia, with long-term current use of insulin [E11.65, Z79.4]  Not Applicable    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]  Yes      Resolved Hospital Problems    Diagnosis Date Resolved POA    Hyponatremia [E87.1] 04/11/2024 Yes     POA, Na         Allergies:Review of patient's allergies indicates:  No Known Allergies    Diet:  TUBE FEEDS    Activities: Activity as tolerated    Goals of Care Treatment Preferences:  Code Status: Full Code          What is most important right now is to focus on curative/life-prolongation (regardless of treatment burdens).  Accordingly, we have decided that the best plan to meet the patient's goals includes continuing with treatment.      Nursing: Routine nursing care;      Labs: Per Nephrology during dialysis and otherwise as routine per facility    CONSULTS:    Physical Therapy to evaluate and treat. , Occupational Therapy to evaluate and treat., and Speech Therapy to evaluate and treat for Language and Swallowing.    MISCELLANEOUS CARE:  PEG Care: Clean site every 24 hours. , Glynn Care: Empty Glynn bag every shift. Change Glynn every month., Routine Skin for Bedridden Patients: Apply moisture barrier cream to all skin folds  and wet areas in perineal area daily and after baths and all bowel movements., and Diabetes Care:   SN to perform and educate Diabetic management with blood glucose monitoring:, Fingerstick blood sugar AC and HS, and Report CBG < 60 or > 350 to physician.    WOUND CARE ORDERS  Yes: Surgical Wound:  Location: perineum    Consult ET nurse        Apply the following to wound:   Collagenase (Santyl) daily, cover with telfa, wrap with with kerlix dressing  Other: Wound cleanser/antimicrobial agent (frequency)    Medications: Review discharge medications with patient and family and provide education.      Current Discharge Medication List        START taking these medications    Details   aspirin 81 MG Chew 1 tablet (81 mg total) by Per G Tube route once daily.    Associated Diagnoses: Sepsis, due to unspecified organism, unspecified whether acute organ dysfunction present      insulin aspart U-100 (NOVOLOG) 100 unit/mL (3 mL) InPn pen Inject 4 Units into the skin every 6 (six) hours.    Associated Diagnoses: Sepsis, due to unspecified organism, unspecified whether acute organ dysfunction present      insulin detemir U-100, Levemir, 100 unit/mL (3 mL) SubQ InPn pen Inject 27 Units into the skin 2 (two) times daily.    Associated Diagnoses: Sepsis, due to unspecified organism, unspecified whether acute organ dysfunction present      polyethylene glycol (GLYCOLAX) 17 gram PwPk 17 g by Per G Tube route once daily.    Associated Diagnoses: Sepsis, due to unspecified organism, unspecified whether acute organ dysfunction present           CONTINUE these medications which have CHANGED    Details   amLODIPine (NORVASC) 10 MG tablet 1 tablet (10 mg total) by Per G Tube route once daily. Hold until follow up with primary care physician    Comments: .  Associated Diagnoses: Sepsis, due to unspecified organism, unspecified whether acute organ dysfunction present      carvediloL (COREG) 25 MG tablet 1 tablet (25 mg total) by Per G  Tube route 2 (two) times daily. Hold until follow up with primary care physician    Comments: .  Associated Diagnoses: Sepsis, due to unspecified organism, unspecified whether acute organ dysfunction present      sevelamer carbonate (RENVELA) 0.8 gram PwPk 1 packet (0.8 g total) by Per G Tube route 3 (three) times daily.    Associated Diagnoses: Sepsis, due to unspecified organism, unspecified whether acute organ dysfunction present           CONTINUE these medications which have NOT CHANGED    Details   ascorbic acid, vitamin C, (VITAMIN C) 500 MG tablet 500 mg by Per G Tube route 2 (two) times daily.      chlorhexidine (PERIDEX) 0.12 % solution Use as directed 15 mLs in the mouth or throat 2 (two) times daily.      pantoprazole sodium (PANTOPRAZOLE ORAL) 40 mg once daily. Liquid via gtube      psyllium (KONSYL) Powd 1 packet by Per G Tube route once daily.      zinc sulfate (ZINCATE) 50 mg zinc (220 mg) capsule 220 mg by Per G Tube route once daily.           STOP taking these medications       heparin sodium,porcine (HEPARIN, PORCINE,) 5,000 unit/mL injection Comments:   Reason for Stopping:         insulin aspart U-100 (NOVOLOG) 100 unit/mL injection Comments:   Reason for Stopping:         insulin detemir U-100 (LEVEMIR) 100 unit/mL injection Comments:   Reason for Stopping:                  Immunizations Administered as of 4/17/2024       No immunizations on file.            This patient has had both covid vaccinations    Some patients may experience side effects after vaccination.  These may include fever, headache, muscle or joint aches.  Most symptoms resolve with 24-48 hours and do not require urgent medical evaluation unless they persist for more than 72 hours or symptoms are concerning for an unrelated medical condition.          _________________________________  Adi Aguirre MD  04/17/2024

## 2024-04-17 NOTE — PROGRESS NOTES
Pt completed 3.5Hr HD. 1.5L removed. RIJ Tunnel CVC heparin locked and dressing changed during treatment. VSS. Contact precautions maintained. Report given to Daniela BARBER. Pt left [facility] via bed with transport.

## 2024-04-17 NOTE — CARE UPDATE
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    Last VS: /84   Pulse 103   Temp 99.6 °F (37.6 °C) (Axillary)   Resp (!) 22   Ht 5' 7" (1.702 m)   Wt 122.5 kg (270 lb)   SpO2 100%   BMI 42.29 kg/m²     24H Vital Sign Range:  Temp:  [99.1 °F (37.3 °C)-99.6 °F (37.6 °C)]   Pulse:  [102-110]   Resp:  [12-32]   BP: (120-132)/(75-85)   SpO2:  [98 %-100 %]     Level of Consciousness (AVPU): responds to voice    Recent Labs     04/15/24  0309 04/16/24  0418 04/17/24  0634   WBC 11.46 12.00 12.68   HGB 9.2* 9.4* 9.2*   HCT 30.1* 30.4* 29.3*    209 231       Recent Labs     04/15/24  0309 04/16/24  0418 04/17/24  0634   * 128* 130*   K 3.4* 3.7 4.1   CL 93* 92* 92*   CO2 20* 23 23   BUN 77* 46* 61*   CREATININE 7.6* 5.0* 6.0*   * 132* 141*   PHOS 3.7 2.7 4.2   MG 2.4 2.1 2.2          OXYGEN:  Flow (L/min): 5  Oxygen Concentration (%): 21       MEWS score: 4    Charge Berta BARBER  contacted for MEWs score. Vitals stable this morning. No additional concerns verbalized at this time. Instructed to call [phone] for further concerns or assistance.    Wilian Bustos RN       "

## 2024-04-17 NOTE — PROGRESS NOTES
[facility]FF HEMODIALYSIS NOTE     Patient currently on hemodialysis for removal of uremic toxins and volume.     Patient seen and evaluated on hemodialysis, tolerating treatment, see HD flowsheet for vitals and assessments.    Labs have been reviewed and the dialysate bath has been adjusted.       Assessment/Plan:      -Patient seen on HD, tolerating treatment well, w/o complaints   -UF goal of 1.5-2L  -Renal diet, if not NPO   -Strict I/O's and daily weights  -Daily renal function panels  -Keep MAP >65 while on HD   -Hgb goal 10-11, continue epo   -Continue sevelamer   -Will continue to follow while inpatient     Delfina Shi DNP-FNP, C  Nephrology  Pager: [phone]

## 2024-04-18 PROBLEM — E44.0 MODERATE MALNUTRITION: Status: ACTIVE | Noted: 2024-04-18

## 2024-04-18 PROBLEM — K59.00 CONSTIPATION: Status: ACTIVE | Noted: 2024-04-18

## 2024-04-18 LAB
BACTERIA BLD CULT: NORMAL
BACTERIA BLD CULT: NORMAL
BASOPHILS # BLD AUTO: 0.04 K/UL (ref 0–0.2)
BASOPHILS NFR BLD: 0.4 % (ref 0–1.9)
DIFFERENTIAL METHOD BLD: ABNORMAL
EOSINOPHIL # BLD AUTO: 0.6 K/UL (ref 0–0.5)
EOSINOPHIL NFR BLD: 4.9 % (ref 0–8)
ERYTHROCYTE [DISTWIDTH] IN BLOOD BY AUTOMATED COUNT: 16.7 % (ref 11.5–14.5)
HCT VFR BLD AUTO: 31 % (ref 37–48.5)
HGB BLD-MCNC: 9.3 G/DL (ref 12–16)
IMM GRANULOCYTES # BLD AUTO: 0.13 K/UL (ref 0–0.04)
IMM GRANULOCYTES NFR BLD AUTO: 1.2 % (ref 0–0.5)
LYMPHOCYTES # BLD AUTO: 2.6 K/UL (ref 1–4.8)
LYMPHOCYTES NFR BLD: 23 % (ref 18–48)
MCH RBC QN AUTO: 27.6 PG (ref 27–31)
MCHC RBC AUTO-ENTMCNC: 30 G/DL (ref 32–36)
MCV RBC AUTO: 92 FL (ref 82–98)
MONOCYTES # BLD AUTO: 1.5 K/UL (ref 0.3–1)
MONOCYTES NFR BLD: 13.2 % (ref 4–15)
NEUTROPHILS # BLD AUTO: 6.5 K/UL (ref 1.8–7.7)
NEUTROPHILS NFR BLD: 57.3 % (ref 38–73)
NRBC BLD-RTO: 0 /100 WBC
PLATELET # BLD AUTO: 220 K/UL (ref 150–450)
PMV BLD AUTO: 10.4 FL (ref 9.2–12.9)
POCT GLUCOSE: 133 MG/DL (ref 70–110)
POCT GLUCOSE: 149 MG/DL (ref 70–110)
POCT GLUCOSE: 159 MG/DL (ref 70–110)
POCT GLUCOSE: 171 MG/DL (ref 70–110)
POCT GLUCOSE: 176 MG/DL (ref 70–110)
RBC # BLD AUTO: 3.37 M/UL (ref 4–5.4)
WBC # BLD AUTO: 11.3 K/UL (ref 3.9–12.7)

## 2024-04-18 PROCEDURE — 25000003 PHARM REV CODE 250: Performed by: STUDENT IN AN ORGANIZED HEALTH CARE EDUCATION/TRAINING PROGRAM

## 2024-04-18 PROCEDURE — 27000207 HC ISOLATION

## 2024-04-18 PROCEDURE — 99900035 HC TECH TIME PER 15 MIN (STAT)

## 2024-04-18 PROCEDURE — 85025 COMPLETE CBC W/AUTO DIFF WBC: CPT

## 2024-04-18 PROCEDURE — 99900026 HC AIRWAY MAINTENANCE (STAT)

## 2024-04-18 PROCEDURE — 27000221 HC OXYGEN, UP TO 24 HOURS

## 2024-04-18 PROCEDURE — 25000003 PHARM REV CODE 250

## 2024-04-18 PROCEDURE — 25000242 PHARM REV CODE 250 ALT 637 W/ HCPCS

## 2024-04-18 PROCEDURE — 94761 N-INVAS EAR/PLS OXIMETRY MLT: CPT

## 2024-04-18 PROCEDURE — 20600001 HC STEP DOWN PRIVATE ROOM

## 2024-04-18 PROCEDURE — 63600175 PHARM REV CODE 636 W HCPCS

## 2024-04-18 PROCEDURE — 36415 COLL VENOUS BLD VENIPUNCTURE: CPT

## 2024-04-18 PROCEDURE — 99232 SBSQ HOSP IP/OBS MODERATE 35: CPT | Mod: ,,, | Performed by: NURSE PRACTITIONER

## 2024-04-18 RX ORDER — POLYETHYLENE GLYCOL 3350 17 G/17G
17 POWDER, FOR SOLUTION ORAL 2 TIMES DAILY
Status: DISCONTINUED | OUTPATIENT
Start: 2024-04-18 | End: 2024-05-11

## 2024-04-18 RX ORDER — SYRING-NEEDL,DISP,INSUL,0.3 ML 29 G X1/2"
296 SYRINGE, EMPTY DISPOSABLE MISCELLANEOUS ONCE
Status: COMPLETED | OUTPATIENT
Start: 2024-04-18 | End: 2024-04-18

## 2024-04-18 RX ORDER — SODIUM CHLORIDE 9 MG/ML
INJECTION, SOLUTION INTRAVENOUS ONCE
Status: COMPLETED | OUTPATIENT
Start: 2024-04-19 | End: 2024-04-19

## 2024-04-18 RX ORDER — AMOXICILLIN 250 MG
1 CAPSULE ORAL 2 TIMES DAILY
Status: DISCONTINUED | OUTPATIENT
Start: 2024-04-18 | End: 2024-04-19

## 2024-04-18 RX ORDER — AMOXICILLIN 250 MG
1 CAPSULE ORAL DAILY
Status: DISCONTINUED | OUTPATIENT
Start: 2024-04-18 | End: 2024-04-18

## 2024-04-18 RX ADMIN — SEVELAMER CARBONATE 0.8 G: 2400 POWDER, FOR SUSPENSION ORAL at 09:04

## 2024-04-18 RX ADMIN — HEPARIN SODIUM 7500 UNITS: 5000 INJECTION INTRAVENOUS; SUBCUTANEOUS at 06:04

## 2024-04-18 RX ADMIN — HEPARIN SODIUM 7500 UNITS: 5000 INJECTION INTRAVENOUS; SUBCUTANEOUS at 09:04

## 2024-04-18 RX ADMIN — INSULIN ASPART 4 UNITS: 100 INJECTION, SOLUTION INTRAVENOUS; SUBCUTANEOUS at 12:04

## 2024-04-18 RX ADMIN — DOCUSATE SODIUM AND SENNOSIDES 1 TABLET: 8.6; 5 TABLET, FILM COATED ORAL at 09:04

## 2024-04-18 RX ADMIN — INSULIN DETEMIR 27 UNITS: 100 INJECTION, SOLUTION SUBCUTANEOUS at 08:04

## 2024-04-18 RX ADMIN — Medication 500 MG: at 08:04

## 2024-04-18 RX ADMIN — SEVELAMER CARBONATE 0.8 G: 2400 POWDER, FOR SUSPENSION ORAL at 02:04

## 2024-04-18 RX ADMIN — DOCUSATE SODIUM AND SENNOSIDES 1 TABLET: 8.6; 5 TABLET, FILM COATED ORAL at 08:04

## 2024-04-18 RX ADMIN — POLYETHYLENE GLYCOL 3350 17 G: 17 POWDER, FOR SOLUTION ORAL at 09:04

## 2024-04-18 RX ADMIN — SEVELAMER CARBONATE 0.8 G: 2400 POWDER, FOR SUSPENSION ORAL at 08:04

## 2024-04-18 RX ADMIN — INSULIN ASPART 4 UNITS: 100 INJECTION, SOLUTION INTRAVENOUS; SUBCUTANEOUS at 02:04

## 2024-04-18 RX ADMIN — ASPIRIN 81 MG CHEWABLE TABLET 81 MG: 81 TABLET CHEWABLE at 09:04

## 2024-04-18 RX ADMIN — INSULIN ASPART 4 UNITS: 100 INJECTION, SOLUTION INTRAVENOUS; SUBCUTANEOUS at 06:04

## 2024-04-18 RX ADMIN — ZINC SULFATE 220 MG (50 MG) CAPSULE 220 MG: CAPSULE at 09:04

## 2024-04-18 RX ADMIN — PANTOPRAZOLE SODIUM 40 MG: 40 GRANULE, DELAYED RELEASE ORAL at 09:04

## 2024-04-18 RX ADMIN — HEPARIN SODIUM 7500 UNITS: 5000 INJECTION INTRAVENOUS; SUBCUTANEOUS at 02:04

## 2024-04-18 RX ADMIN — MAGNESIUM CITRATE 296 ML: 1.75 LIQUID ORAL at 06:04

## 2024-04-18 RX ADMIN — INSULIN ASPART 2 UNITS: 100 INJECTION, SOLUTION INTRAVENOUS; SUBCUTANEOUS at 09:04

## 2024-04-18 RX ADMIN — PSYLLIUM HUSK 1 PACKET: 3.4 POWDER ORAL at 09:04

## 2024-04-18 RX ADMIN — Medication 500 MG: at 09:04

## 2024-04-18 RX ADMIN — POLYETHYLENE GLYCOL 3350 17 G: 17 POWDER, FOR SOLUTION ORAL at 08:04

## 2024-04-18 RX ADMIN — INSULIN DETEMIR 27 UNITS: 100 INJECTION, SOLUTION SUBCUTANEOUS at 09:04

## 2024-04-18 NOTE — ASSESSMENT & PLAN NOTE
Malnutrition Type:  Context: acute illness or injury  Level: moderate    Related to (etiology):   Inability to consume sufficient energy     Signs and Symptoms (as evidenced by):   Weight loss, NFPE, Edema    Malnutrition Characteristic Summary:  Weight Loss (Malnutrition): 10% in 6 months  Subcutaneous Fat (Malnutrition): mild depletion  Muscle Mass (Malnutrition): mild depletion  Fluid Accumulation (Malnutrition): mild    Interventions/Recommendations (treatment strategy):  Collaboration of nutrition care w/ other providers  EN    Nutrition Diagnosis Status:   New/Continues

## 2024-04-18 NOTE — RESPIRATORY THERAPY
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  Clinically Significant Surgical Hx: tracheostomy    24 Hours Vitals Range:  Temp:  [98.5 °F (36.9 °C)-100.5 °F (38.1 °C)]   Pulse:  [105-118]   Resp:  [13-19]   BP: (119-130)/(77-91)   SpO2:  [97 %-100 %]     Labs:    Recent Labs     24  0418 24  0634   * 130*   K 3.7 4.1   CL 92* 92*   CO2 23 23   BUN 46* 61*   CREATININE 5.0* 6.0*   * 141*   PHOS 2.7 4.2   MG 2.1 2.2        No results for input(s): "PH", "PCO2", "PO2", "HCO3", "POCSATURATED", "BE" in the last 72 hours.    ASSESSMENT/INTERVENTIONS  Bedside LDA check completed      Last VS   Temp: 98.5 °F (36.9 °C) ( 1133)  Pulse: 108 ( 1150)  Resp: 13 ( 1150)  BP: 124/77 ( 1133)  SpO2: 100 % ( 1150)      Extra trachs at bedside: 4 CN/6 CN  Level of Consciousness: Level of Consciousness (AVPU): alert  Respiratory Effort: Respiratory Effort: Normal, Unlabored Expansion/Accessory Muscle Usage: Expansion/Accessory Muscles/Retractions: expansion symmetric, no retractions, no use of accessory muscles  All Lung Field Breath Sounds: All Lung Fields Breath Sounds: Anterior:, Lateral:, coarse  JOSE Breath Sounds: diminished  LLL Breath Sounds: diminished  RUL Breath Sounds: diminished  RML Breath Sounds: diminished  RLL Breath Sounds: diminished  O2 Device/Concentration: 5 lpm/21% FiO2  Surgical airway: Yes, Type: Shiley Size: 6, uncuffed  Ambu at bedside:       Active Orders   Respiratory Care    Oxygen Continuous     Frequency: Continuous     Number of Occurrences: Until Specified     Order Questions:      Device type: Low flow      " Device: Trach Collar      FiO2%: 28%      Titrate O2 per Oxygen Titration Protocol: Yes      To maintain SpO2 goal of: >= 90%      Notify MD of: Inability to achieve desired SpO2; Sudden change in patient status and requires 20% increase in FiO2; Patient requires >60% FiO2    Pulse Oximetry Continuous     Frequency: Continuous     Number of Occurrences: Until Specified    Routine tracheostomy care     Frequency: BID     Number of Occurrences: Until Specified       RECOMMENDATIONS    We recommend: RRT Recs: Continue POC per primary team.      FOLLOW-UP    Please call back the Rapid Response RT, Cait Mcarthur RRT at x [phone] for any questions or concerns.

## 2024-04-18 NOTE — CLINICAL REVIEW
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    Last VS: /85 (BP Location: Right arm, Patient Position: Lying)   Pulse (!) 116   Temp 99.1 °F (37.3 °C) (Oral)   Resp 19   Ht 5' 7" (1.702 m)   Wt 122.5 kg (270 lb)   SpO2 99%   BMI 42.29 kg/m²     24H Vital Sign Range:  Temp:  [98.4 °F (36.9 °C)-100.5 °F (38.1 °C)]   Pulse:  [105-116]   Resp:  [17-20]   BP: (101-130)/(61-91)   SpO2:  [97 %-100 %]     Level of Consciousness (AVPU): responds to voice    Recent Labs     04/16/24  0418 04/17/24  0634   WBC 12.00 12.68   HGB 9.4* 9.2*   HCT 30.4* 29.3*    231       Recent Labs     04/16/24  0418 04/17/24  0634   * 130*   K 3.7 4.1   CL 92* 92*   CO2 23 23   BUN 46* 61*   CREATININE 5.0* 6.0*   * 141*   PHOS 2.7 4.2   MG 2.1 2.2        No results for input(s): "PH", "PCO2", "PO2", "HCO3", "POCSATURATED", "BE" in the last 72 hours.     OXYGEN:  Flow (L/min): 5  Oxygen Concentration (%): 28       MEWS score: 4    Rounding completed with charge ELISABETH Oviedo reports NAD. No additional concerns verbalized at this time. Instructed to call [phone] for further concerns or assistance.    Marisa Moore RN        "

## 2024-04-18 NOTE — ASSESSMENT & PLAN NOTE
Patient without BM x5d. One episode of vomitus night of 4/17. Some concern for bowel obstruction. Tolerating continuous tube feeds at goal rate with 0cc on residuals. Physical exam with bowel sounds, soft nontender abdomen. KUB without concern for obstruction with bowel gas, lack of transition point.    Plan  - escalating to Miralax BID, Senna BID  - One time mag citrate per PEG ordered  - compazine PRN  - please contact MD team on call with concern for worsening nausea or abdominal pain.

## 2024-04-18 NOTE — SUBJECTIVE & OBJECTIVE
Interval History: HD completed with 1.5 L removed. No distress. Pending dispo.     Review of patient's allergies indicates:  No Known Allergies  Current Facility-Administered Medications   Medication Dose Route Frequency Provider Last Rate Last Admin    [START ON 4/19/2024] 0.9%  NaCl infusion   Intravenous Once Shwetha Gregory DNP, FNP-C        ascorbic acid (vitamin C) tablet 500 mg  500 mg Per G Tube BID Kristopher Tyler DO   500 mg at 04/18/24 0900    aspirin chewable tablet 81 mg  81 mg Per G Tube Daily Adi Aguirre MD   81 mg at 04/18/24 0900    dextrose 10 % infusion   Intravenous Continuous PRN Adi Aguirre MD        dextrose 10% bolus 125 mL 125 mL  12.5 g Intravenous PRN Kristopher Tyler DO        dextrose 10% bolus 250 mL 250 mL  25 g Intravenous PRN Kristopher Tyler DO        epoetin merry injection 6,100 Units  50 Units/kg Intravenous Every Mon, Wed, Fri Shwetha Gregory DNP FNP-C   6,100 Units at 04/17/24 1031    glucagon (human recombinant) injection 1 mg  1 mg Intramuscular PRN Kristopher Tyler DO        glucose chewable tablet 16 g  16 g Oral PRN Kristopher Tyler DO        glucose chewable tablet 24 g  24 g Oral PRN Kristopher Tyler DO        heparin (porcine) injection 1,000 Units  1,000 Units Intra-Catheter PRN Shwetha Gregory DNP FNP-C   1,000 Units at 04/17/24 1314    heparin (porcine) injection 3,000 Units  3,000 Units Intravenous PRN Delfina Shi DNP   3,000 Units at 04/17/24 0925    heparin (porcine) injection 7,500 Units  7,500 Units Subcutaneous Q8H Kristopher Tyler DO   7,500 Units at 04/18/24 0617    insulin aspart U-100 pen 0-10 Units  0-10 Units Subcutaneous Q6H PRN Hola Liriano MD   2 Units at 04/18/24 0928    insulin aspart U-100 pen 4 Units  4 Units Subcutaneous Q6H Hola Liriano MD   4 Units at 04/18/24 0618    insulin detemir U-100 (Levemir) pen 27 Units  27 Units Subcutaneous BID Adi Aguirre MD   27 Units at 04/18/24 0925    naloxone 0.4 mg/mL injection 0.02 mg   0.02 mg Intravenous PRN Kristopher Tyler DO        ondansetron injection 4 mg  4 mg Intravenous Q6H PRN Hola Liriano MD        pantoprazole suspension 40 mg  40 mg Per G Tube Daily Kristopher Tyler DO   40 mg at 04/18/24 0900    polyethylene glycol packet 17 g  17 g Per G Tube Daily Hola Liriano MD   17 g at 04/18/24 0900    psyllium husk (aspartame) 3.4 gram packet 1 packet  1 packet Per G Tube Daily Kristopher Tyler DO   1 packet at 04/18/24 0900    senna-docusate 8.6-50 mg per tablet 1 tablet  1 tablet Oral Daily Adi Aguirre MD   1 tablet at 04/18/24 0924    sevelamer carbonate pwpk 0.8 g  0.8 g Per G Tube TID Hola Liriano MD   0.8 g at 04/18/24 0900    sodium chloride 0.9% flush 10 mL  10 mL Intravenous Q12H PRN Kristopher Tyler DO        zinc sulfate capsule 220 mg  220 mg Per G Tube Daily Kristopher Tyler DO   220 mg at 04/18/24 0900       Objective:     Vital Signs (Most Recent):  Temp: 98.5 °F (36.9 °C) (04/18/24 1133)  Pulse: (!) 118 (04/18/24 1133)  Resp: 14 (04/18/24 1133)  BP: 124/77 (04/18/24 1133)  SpO2: 100 % (04/18/24 1133) Vital Signs (24h Range):  Temp:  [98.4 °F (36.9 °C)-100.5 °F (38.1 °C)] 98.5 °F (36.9 °C)  Pulse:  [105-118] 118  Resp:  [14-20] 14  SpO2:  [97 %-100 %] 100 %  BP: (101-130)/(65-91) 124/77     Weight: 122.5 kg (270 lb) (04/15/24 1320)  Body mass index is 42.29 kg/m².  Body surface area is 2.41 meters squared.    I/O last 3 completed shifts:  In: 652.1 [I.V.:352.1; Other:300]  Out: 2150 [Other:2150]     Physical Exam  Vitals and nursing note reviewed.   Constitutional:       General: She is not in acute distress.     Appearance: She is ill-appearing.      Comments: Trach in place, no purulence    HENT:      Head: Normocephalic and atraumatic.   Eyes:      General: No scleral icterus.     Extraocular Movements: Extraocular movements intact.   Cardiovascular:      Rate and Rhythm: Normal rate and regular rhythm.   Pulmonary:      Effort: Pulmonary effort is normal. No  respiratory distress.   Abdominal:      General: Abdomen is flat. There is no distension.      Palpations: Abdomen is soft.      Tenderness: There is no abdominal tenderness.      Comments: Peg tube in place, no purulence noted   Musculoskeletal:      Right lower leg: No edema.      Left lower leg: No edema.   Skin:     General: Skin is warm and dry.      Findings: Wound present.   Neurological:      Mental Status: She is alert.      Comments: Nonverbal   Psychiatric:      Comments: Nonverbal          Significant Labs:  CBC:   Recent Labs   Lab 04/18/24  0806   WBC 11.30   RBC 3.37*   HGB 9.3*   HCT 31.0*      MCV 92   MCH 27.6   MCHC 30.0*     CMP:   Recent Labs   Lab 04/14/24  0241 04/15/24  0309 04/17/24  0634   *   < > 141*   CALCIUM 10.4   < > 10.0   ALBUMIN 3.1*  --   --    PROT 8.4  --   --    *   < > 130*   K 3.5   < > 4.1   CO2 22*   < > 23   CL 94*   < > 92*   BUN 62*   < > 61*   CREATININE 6.6*   < > 6.0*   ALKPHOS 109  --   --    ALT 26  --   --    AST 20  --   --    BILITOT 0.3  --   --     < > = values in this interval not displayed.     All labs within the past 24 hours have been reviewed.

## 2024-04-18 NOTE — PLAN OF CARE
Recommendations     Continue TF regimen of Novasource @ 40 mL/hr - meeting needs.  - If bolus TFs warranted for discharge, rec'd Novasource - 4 cans/day = 1900 kcals, 88 g of protein, 672 mL fluid.   RD to monitor & follow-up.     Goals: Meet % EEN, EPN by RD f/u date  Nutrition Goal Status: goal met  Communication of RD Recs: reviewed with RN

## 2024-04-18 NOTE — PLAN OF CARE
Problem: Adult Inpatient Plan of Care  Goal: Absence of Hospital-Acquired Illness or Injury  Outcome: Ongoing, Progressing  Goal: Optimal Comfort and Wellbeing  Outcome: Ongoing, Progressing  Goal: Readiness for Transition of Care  Outcome: Ongoing, Progressing     Problem: Bariatric Environmental Safety  Goal: Safety Maintained with Care  Outcome: Ongoing, Progressing     Pt resting in bed quietly with eyes closed, breathing even and unlabored, safety checks done, no acute distress noted at this time.

## 2024-04-18 NOTE — PLAN OF CARE
[facility] - Telemetry Stepdown  Discharge Reassessment    Primary Care Provider: No, Primary Doctor    Expected Discharge Date: 4/22/2024    Reassessment (most recent)       Discharge Reassessment - 04/18/24 1012          Discharge Reassessment    Assessment Type Discharge Planning Reassessment     Did the patient's condition or plan change since previous assessment? Yes     Discharge Plan discussed with: Adult children     Name(s) and Number(s) [facility] [phone] (P)      Communicated ZEKE with patient/caregiver Date not available/Unable to determine (P)      Discharge Plan A Long-term acute care facility (LTAC) (P)      Discharge Plan B Home with family (P)      DME Needed Upon Discharge  none (P)      Transition of Care Barriers None (P)      Why the patient remains in the hospital Placement issues (P)               Pt not ready for discharge due to pending LTAC bed   CM will remain available for families   Currently pt has d/c plans in progress at this time.    Discharge Plan A and Plan B have been determined by review of patient's clinical status, future medical and therapeutic needs, and coverage/benefits for post-acute care in coordination with multidisciplinary team members.       [facility] unable to accept patient due to no medical bed available. Dani Chavez liaison to keep CM updated when bed becomes available.     [facility] has no bed availability today. Possibly will have a bed Friday or next week.      1200 CM reached contacted Dr. Sadler via secure chat to determine patient's ZEKE. Awaiting response.

## 2024-04-18 NOTE — PROGRESS NOTES
[facility] - Telemetry Stepdown  Nephrology  Progress Note    Patient Name: Sarah Saravia  MRN: [id number]  Admission Date: 4/10/2024  Hospital Length of Stay: 7 days  Attending Provider: Chandrika Sadler MD   Primary Care Physician: Deanna, Primary Doctor  Principal Problem:Acute cystitis with hematuria    Subjective:     Interval History: HD completed with 1.5 L removed. No distress. Pending dispo.     Review of patient's allergies indicates:  No Known Allergies  Current Facility-Administered Medications   Medication Dose Route Frequency Provider Last Rate Last Admin    [START ON 4/19/2024] 0.9%  NaCl infusion   Intravenous Once Shwetha Gregory DNP, FNP-C        ascorbic acid (vitamin C) tablet 500 mg  500 mg Per G Tube BID Kristopher Tyler DO   500 mg at 04/18/24 0900    aspirin chewable tablet 81 mg  81 mg Per G Tube Daily Adi Aguirre MD   81 mg at 04/18/24 0900    dextrose 10 % infusion   Intravenous Continuous PRN Adi Aguirre MD        dextrose 10% bolus 125 mL 125 mL  12.5 g Intravenous PRN Kristopher Tyler DO        dextrose 10% bolus 250 mL 250 mL  25 g Intravenous PRN Kristopher Tyler DO        epoetin merry injection 6,100 Units  50 Units/kg Intravenous Every Mon, Wed, Fri Shwetha Gregory DNP, FNP-C   6,100 Units at 04/17/24 1031    glucagon (human recombinant) injection 1 mg  1 mg Intramuscular PRN Kristopher Tyler DO        glucose chewable tablet 16 g  16 g Oral PRN Kristopher Tyler DO        glucose chewable tablet 24 g  24 g Oral PRN Kristopher Tyler DO        heparin (porcine) injection 1,000 Units  1,000 Units Intra-Catheter PRN Shwetha Gregory DNP, FNP-C   1,000 Units at 04/17/24 1314    heparin (porcine) injection 3,000 Units  3,000 Units Intravenous PRN Delfina Shi DNP   3,000 Units at 04/17/24 0925    heparin (porcine) injection 7,500 Units  7,500 Units Subcutaneous Q8H Kristopher Tyler DO   7,500 Units at 04/18/24 0617    insulin aspart U-100 pen 0-10 Units  0-10 Units Subcutaneous  Q6H PRN Hola Liriano MD   2 Units at 04/18/24 0928    insulin aspart U-100 pen 4 Units  4 Units Subcutaneous Q6H Hola Liriano MD   4 Units at 04/18/24 0618    insulin detemir U-100 (Levemir) pen 27 Units  27 Units Subcutaneous BID Adi Aguirre MD   27 Units at 04/18/24 0925    naloxone 0.4 mg/mL injection 0.02 mg  0.02 mg Intravenous PRN Kristopher Tyler DO        ondansetron injection 4 mg  4 mg Intravenous Q6H PRN Hola Liriano MD        pantoprazole suspension 40 mg  40 mg Per G Tube Daily Kristopher Tyler DO   40 mg at 04/18/24 0900    polyethylene glycol packet 17 g  17 g Per G Tube Daily Hola Liriano MD   17 g at 04/18/24 0900    psyllium husk (aspartame) 3.4 gram packet 1 packet  1 packet Per G Tube Daily Kristopher Tyler DO   1 packet at 04/18/24 0900    senna-docusate 8.6-50 mg per tablet 1 tablet  1 tablet Oral Daily Adi Aguirre MD   1 tablet at 04/18/24 0924    sevelamer carbonate pwpk 0.8 g  0.8 g Per G Tube TID Hola Liriano MD   0.8 g at 04/18/24 0900    sodium chloride 0.9% flush 10 mL  10 mL Intravenous Q12H PRN Kristopher Tyler DO        zinc sulfate capsule 220 mg  220 mg Per G Tube Daily Kristopher Tyler DO   220 mg at 04/18/24 0900       Objective:     Vital Signs (Most Recent):  Temp: 98.5 °F (36.9 °C) (04/18/24 1133)  Pulse: (!) 118 (04/18/24 1133)  Resp: 14 (04/18/24 1133)  BP: 124/77 (04/18/24 1133)  SpO2: 100 % (04/18/24 1133) Vital Signs (24h Range):  Temp:  [98.4 °F (36.9 °C)-100.5 °F (38.1 °C)] 98.5 °F (36.9 °C)  Pulse:  [105-118] 118  Resp:  [14-20] 14  SpO2:  [97 %-100 %] 100 %  BP: (101-130)/(65-91) 124/77     Weight: 122.5 kg (270 lb) (04/15/24 1320)  Body mass index is 42.29 kg/m².  Body surface area is 2.41 meters squared.    I/O last 3 completed shifts:  In: 652.1 [I.V.:352.1; Other:300]  Out: 2150 [Other:2150]     Physical Exam  Vitals and nursing note reviewed.   Constitutional:       General: She is not in acute distress.     Appearance: She is ill-appearing.       Comments: Trach in place, no purulence    HENT:      Head: Normocephalic and atraumatic.   Eyes:      General: No scleral icterus.     Extraocular Movements: Extraocular movements intact.   Cardiovascular:      Rate and Rhythm: Normal rate and regular rhythm.   Pulmonary:      Effort: Pulmonary effort is normal. No respiratory distress.   Abdominal:      General: Abdomen is flat. There is no distension.      Palpations: Abdomen is soft.      Tenderness: There is no abdominal tenderness.      Comments: Peg tube in place, no purulence noted   Musculoskeletal:      Right lower leg: No edema.      Left lower leg: No edema.   Skin:     General: Skin is warm and dry.      Findings: Wound present.   Neurological:      Mental Status: She is alert.      Comments: Nonverbal   Psychiatric:      Comments: Nonverbal          Significant Labs:  CBC:   Recent Labs   Lab 04/18/24  0806   WBC 11.30   RBC 3.37*   HGB 9.3*   HCT 31.0*      MCV 92   MCH 27.6   MCHC 30.0*     CMP:   Recent Labs   Lab 04/14/24  0241 04/15/24  0309 04/17/24  0634   *   < > 141*   CALCIUM 10.4   < > 10.0   ALBUMIN 3.1*  --   --    PROT 8.4  --   --    *   < > 130*   K 3.5   < > 4.1   CO2 22*   < > 23   CL 94*   < > 92*   BUN 62*   < > 61*   CREATININE 6.6*   < > 6.0*   ALKPHOS 109  --   --    ALT 26  --   --    AST 20  --   --    BILITOT 0.3  --   --     < > = values in this interval not displayed.     All labs within the past 24 hours have been reviewed.     Assessment/Plan:     Renal/  * Acute cystitis with hematuria  - defer to primary     ESRD (end stage renal disease) on dialysis  53 y.o. Black or  Female ESRD-HD M-W-F at [facility] presents to ED on 4/10/2024 with UTI.    Of note, the patient was initiated on RRT in February 2024 after being admitted with ALVARADO 2/2 iATN due to septic shock. Perm cath placed on 2/29/24. She was transferred to [facility] on 3/12. Deemed ESRD at [facility].  Nephrology consulted for inpatient ESRD-HD  management    Assessment:   - Dialysis for metabolic clearance and volume management will be provided tomorrow AM.  - Continue to monitor intake and output  - Please avoid gadolinium, fleets, phos-based laxatives, NSAIDs  - Dialysis thrice weekly unless more urgent indications arise. Will evaluate RRT requirements Daily.    Anemia of ESRD   Recent Labs   Lab 04/16/24  0418 04/17/24  0634 04/18/24  0806   WBC 12.00 12.68 11.30   HGB 9.4* 9.2* 9.3*   HCT 30.4* 29.3* 31.0*    231 220       Lab Results   Component Value Date    FESATURATED 10 (L) 03/15/2024    FERRITIN 414 (H) 03/15/2024       - Goal in ESRD is Hgb of 10-11.   - No EPO    Mineral Bone Disease in ESRD   Lab Results   Component Value Date    CALCIUM 10.0 04/17/2024    ALBUMIN 3.1 (L) 04/14/2024    CAION 1.12 02/21/2024    PHOS 4.2 04/17/2024       - F/U PO4, Mg, Calcium. And albumin levels daily.   - Renal diet with protein intake goal 1.5 g/kg/d with 1 L fluid restriction   - Phos 4.2, continue Sevelamer.    Ayaz's gangrene  - Per chart         Thank you for your consult. I will follow-up with patient. Please contact us if you have any additional questions.    Shwetha Gregory, POONAM, FNP-C  Nephrology  [facility] - Telemetry Stepdown

## 2024-04-18 NOTE — PROGRESS NOTES
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 History was given from patient's daughter. She was undergoing dialysis today and noticed she was lethargic, less alert than usual self. Pt completed dialysis and still not acting herself. EMS was called, fever of a 100.  On chart review, she did have an episode of large volume emesis around 1700.  Per EMS, she had a slightly elevated temp 100.0°F, glucose 300s.     In the ED: UA 2+ leuks, >100 WBC, many bacteria, WBC 17, CT abd/pelvis concerning for cystitis. Given vanc/zosyn    Overview/Hospital Course:  Patient was admitted to Hospital Medicine service for medical management and evaluation of urosepsis. Patient was continued on vanc/zosyn. Vascular Neurology consulted concerning mental status change with the recommendation to initiate ASA 81 QD and to obtain an MRI to r/o new stroke. Imaging pending. Nephrology was consulted for regularly scheduled dialysis. Afternoon of 4/12, patient was unable to tolerate filtration of volume during dialsis 2/2 hypotension. Patient received 500cc bolus and was transferred back to floor after finishing the session without volume removed. Will monitor for signs of volume overload. Tailoring insulin regimen while uptitrating tube feeds to goal rate. Staph Epi in all 4 bottles; ID with recommendation to continue Vanc & de-escalate meropenem to ertapenem on 04/15 through 4/16. Patient completed IV course of UTI coverage. Patient tolerated volume removal well in dialysis  throughout the rest of her hospital stay. Pending discharge to LTACH pending bed availability. Patient without BM with one episode of vomiting overnight 4/17. KUB with bowel gas and low concern for obstruction with no transition point noted. Escalating bowel regimen.    Interval History: NAEON.  Afebrile, HD stable. Patient without BM with one episode of vomiting overnight 4/17. KUB with bowel gas and low concern for obstruction with no transition point noted. Escalating bowel regimen.    Review of Systems  Objective:     Vital Signs (Most Recent):  Temp: 98.5 °F (36.9 °C) (04/18/24 1133)  Pulse: 109 (04/18/24 1512)  Resp: 13 (04/18/24 1150)  BP: 124/77 (04/18/24 1133)  SpO2: 100 % (04/18/24 1150) Vital Signs (24h Range):  Temp:  [98.5 °F (36.9 °C)-100.5 °F (38.1 °C)] 98.5 °F (36.9 °C)  Pulse:  [105-118] 109  Resp:  [13-19] 13  SpO2:  [97 %-100 %] 100 %  BP: (119-130)/(77-91) 124/77     Weight: 122.5 kg (270 lb 1 oz)  Body mass index is 42.3 kg/m².    Intake/Output Summary (Last 24 hours) at 4/18/2024 1523  Last data filed at 4/18/2024 0705  Gross per 24 hour   Intake 50 ml   Output --   Net 50 ml         Physical Exam  Vitals and nursing note reviewed.   Constitutional:       General: She is not in acute distress.     Appearance: She is not ill-appearing, toxic-appearing or diaphoretic.      Comments: Trach in place, no purulence    HENT:      Head: Normocephalic and atraumatic.      Right Ear: External ear normal.      Left Ear: External ear normal.      Mouth/Throat:      Mouth: Mucous membranes are moist.      Pharynx: No oropharyngeal exudate.   Eyes:      General: No scleral icterus.     Extraocular Movements: Extraocular movements intact.      Pupils: Pupils are equal, round, and reactive to light.   Cardiovascular:      Rate and Rhythm: Normal rate and regular rhythm.      Pulses: Normal pulses.      Heart sounds: Normal heart sounds. No murmur heard.  Pulmonary:      Effort: Pulmonary effort is normal. No  respiratory distress.      Breath sounds: Normal breath sounds. No wheezing or rales.   Abdominal:      General: Abdomen is flat. Bowel sounds are normal. There is no distension.      Palpations: Abdomen is soft. There is no mass.      Tenderness: There is no abdominal tenderness.      Comments: Peg tube in place, no purulence noted   Genitourinary:     Comments: Has large bandage over groin area. Pictures in chart  Musculoskeletal:         General: No swelling or tenderness. Normal range of motion.      Cervical back: Normal range of motion and neck supple.      Right lower leg: No edema.      Left lower leg: No edema.      Comments: Moves BUE spontaneously   Skin:     General: Skin is warm and dry.      Capillary Refill: Capillary refill takes less than 2 seconds.      Findings: Wound present.   Neurological:      General: No focal deficit present.      Mental Status: She is alert and oriented to person, place, and time. Mental status is at baseline.      Comments: Nonverbal   Psychiatric:         Mood and Affect: Mood normal.         Behavior: Behavior normal.      Comments: Nonverbal             Significant Labs: All pertinent labs within the past 24 hours have been reviewed.    Significant Imaging: I have reviewed all pertinent imaging results/findings within the past 24 hours.    Assessment/Plan:      * Acute cystitis with hematuria  Pt presenting with AMS and worsening lethargy. Pt is non-verbal at baseline, does not follow commands, but was less responsive than normal. Pt found to have a fever. Urinary source suspected based off of urine and CT abd/pelvis. Pt has many prior resistant bacterial infections including urinary sources. Has prior with sensitivity to zosyn and has also improved off ED dose of vanc/zosyn. Lactic elevated a 3.8->3.49 prior to completion of fluid bolus 500ml    Plan  S/p vanc/merrem, now transitioned to vanc/ertapenem (on 04/15) per ID. Course completed 4/16.  ID consulted  F/u blood  cultures (4/4 w/ staph epi)  UC w/ proteus; sensitive to erta  Daily cbc  Contact precautions    Constipation  Patient without BM x5d. One episode of vomitus night of 4/17. Some concern for bowel obstruction. Tolerating continuous tube feeds at goal rate with 0cc on residuals. Physical exam with bowel sounds, soft nontender abdomen. KUB without concern for obstruction with bowel gas, lack of transition point.    Plan  - escalating to Miralax BID, Senna BID  - One time mag citrate per PEG ordered  - compazine PRN  - please contact MD team on call with concern for worsening nausea or abdominal pain.    Moderate malnutrition  Nutrition consulted. Most recent weight and BMI monitored-     Measurements:  Wt Readings from Last 1 Encounters:   04/18/24 122.5 kg (270 lb 1 oz)   Body mass index is 42.3 kg/m².    Patient has been screened and assessed by RD.    Malnutrition Type:  Context: acute illness or injury  Level: moderate    Malnutrition Characteristic Summary:  Weight Loss (Malnutrition): 10% in 6 months  Subcutaneous Fat (Malnutrition): mild depletion  Muscle Mass (Malnutrition): mild depletion  Fluid Accumulation (Malnutrition): mild    Interventions/Recommendations (treatment strategy):  1.      Atelectasis  I have personally reviewed Chest X-ray. Patient with atelectasis on interpretation. Likely Non-Obstructive atelectasis secondary to immobility. Signs and symptoms include Hypoxemia Will begin treatment with upright positioning.    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 Physical therapy services has not been scheduled. Continue all standard measures for pressure injury prevention and consult wound care for any wounds  (chronic or acute).    ESRD (end stage renal disease) on dialysis  Creatine stable for now. BMP reviewed- noted Estimated Creatinine Clearance: 14.7 mL/min (A) (based on SCr of 6 mg/dL (H)). according to latest data. Based on current GFR, CKD stage is end stage.  Monitor UOP and serial BMP and adjust therapy as needed. Renally dose meds. Avoid nephrotoxic medications and procedures.    Pt MWF HD, underwent on 4/10, complete session  Plan  Nephrology consult  Monitor fluid status    Acute encephalopathy  Pt is non-verbal and does not follow commands at baseline. Vascular Neurology consulted given acute change from baseline. MRI with concern for evolution of prior strokes with noted differential of T2 hyperintensities including vasculitis vs demyelination. Discussed with Vascular Neurology; low concern for ongoing vasculitis/demyelination, likely represents typical evolution of prior infarcts.    Plan  - STAT head imaging for concern of new change in mental status/focal deficit    Type 2 diabetes mellitus with hyperglycemia, with long-term current use of insulin  Pt currently taking detemir 25 BID, and moderate sliding scale aspart. Glucose elevated in the 300s on arrival. 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 Pt underwent extensive course, currently still healing from this, but no longer has wound vac. Pt's wound currently covered with bandage. Picture in media tabs    Plan  Wound care        VTE Risk Mitigation (From admission, onward)           Ordered     heparin (porcine) injection 3,000 Units  As needed (PRN)         04/17/24 0825     heparin (porcine)  injection 1,000 Units  As needed (PRN)         04/12/24 0951     heparin (porcine) injection 7,500 Units  Every 8 hours         04/11/24 0252                    Discharge Planning   ZEKE: 4/22/2024     Code Status: Full Code   Is the patient medically ready for discharge?: No    Reason for patient still in hospital (select all that apply): Patient trending condition, Treatment, and Pending disposition  Discharge Plan A: Long-term acute care facility (LTAC)                  Adi Aguirre MD  Department of Hospital Medicine   [facility] - Telemetry Stepdown

## 2024-04-18 NOTE — PROGRESS NOTES
[facility] - Telemetry Stepdown  Adult Nutrition  Progress Note    SUMMARY       Recommendations    Continue TF regimen of Novasource @ 40 mL/hr - meeting needs.  - If bolus TFs warranted for discharge, rec'd Novasource - 4 cans/day = 1900 kcals, 88 g of protein, 672 mL fluid.   RD to monitor & follow-up.    Goals: Meet % EEN, EPN by RD f/u date  Nutrition Goal Status: goal met  Communication of RD Recs: reviewed with RN    Assessment and Plan    Moderate malnutrition    Malnutrition Type:  Context: acute illness or injury  Level: moderate    Related to (etiology):   Inability to consume sufficient energy     Signs and Symptoms (as evidenced by):   Weight loss, NFPE, Edema    Malnutrition Characteristic Summary:  Weight Loss (Malnutrition): 10% in 6 months  Subcutaneous Fat (Malnutrition): mild depletion  Muscle Mass (Malnutrition): mild depletion  Fluid Accumulation (Malnutrition): mild    Interventions/Recommendations (treatment strategy):  Collaboration of nutrition care w/ other providers  EN    Nutrition Diagnosis Status:   New/Continues     Malnutrition Assessment    Malnutrition Context: acute illness or injury  Malnutrition Level: moderate    Weight Loss (Malnutrition): 10% in 6 months  Subcutaneous Fat (Malnutrition): mild depletion  Muscle Mass (Malnutrition): mild depletion  Fluid Accumulation (Malnutrition): mild     Reason for Assessment    Reason For Assessment: RD follow-up  Diagnosis: other (see comments) (Acute cystitis with hematuria)  Relevant Medical History: CVA, PEG, DM  Interdisciplinary Rounds: did not attend    General Information Comments: Remains NPO, on trach collar, tolerating TFs via PEG. Pt received HD yesterday. Pt diagnosed w/ malnutrition by LTAC RD (4/10) 2/2 weight loss and NFPE - diagnosis continues; please see PES statement for details.  Nutrition Discharge Planning: Adequate nutrition    Nutrition/Diet History    Food Allergies: NKFA  Factors Affecting Nutritional Intake:  "NPO    Anthropometrics    Temp: 98.5 °F (36.9 °C)  Height Method: Stated  Height: 5' 7" (170.2 cm)  Height (inches): 67 in  Weight Method: Bed Scale  Weight: 122.5 kg (270 lb 1 oz)  Weight (lb): 270.07 lb  Ideal Body Weight (IBW), Female: 135 lb  % Ideal Body Weight, Female (lb): 200.05 %  BMI (Calculated): 42.3  BMI Grade: greater than 40 - morbid obesity  Usual Body Weight (UBW), kg: (!) 136.4 kg  % Usual Body Weight: 90  % Weight Change From Usual Weight: -10.19 %    Lab/Procedures/Meds    Pertinent Labs Reviewed: reviewed  Pertinent Labs Comments: Creat 6, GFR 7.8, A1C 10.4  Pertinent Medications Reviewed: reviewed  Pertinent Medications Comments: Psyllium husk    Estimated/Assessed Needs    Weight Used For Calorie Calculations: 122.5 kg (270 lb 1 oz)    Energy Calorie Requirements (kcal): 2048 kcal/d  Energy Need Method: Sutter-St Jeor (1.1 PAL)    Protein Requirements:  g/d (.8-1 g/kg)  Weight Used For Protein Calculations: 122.5 kg (270 lb 1 oz)    Estimated Fluid Requirement Method: other (see comments) (Per MD)  RDA Method (mL): 2048    CHO Requirement: 256g    Nutrition Prescription Ordered    Current Diet Order: NPO  Current Nutrition Support Formula Ordered: Novasource Renal  Current Nutrition Support Rate Ordered: 40 mL/hr    Evaluation of Received Nutrient/Fluid Intake    Enteral Calories (kcal): 1920  Enteral Protein (gm): 87  Enteral (Free Water) Fluid (mL): 688    % Kcal Needs: 94%  % Protein Needs: 90%    I/O: +4.7L since admit    Energy Calories Required: meeting needs  Protein Required: meeting needs  Fluid Required: other (see comments) (Per MD)    Comments: LBM: 4/13    Tolerance: tolerating    Nutrition Risk    Level of Risk/Frequency of Follow-up:  (1x/week)     Monitor and Evaluation    Food and Nutrient Intake: enteral nutrition intake  Food and Nutrient Adminstration: enteral and parenteral nutrition administration  Physical Activity and Function: nutrition-related ADLs and " IADLs  Anthropometric Measurements: weight, weight change  Biochemical Data, Medical Tests and Procedures: glucose/endocrine profile, lipid profile, inflammatory profile, gastrointestinal profile  Nutrition-Focused Physical Findings: overall appearance     Nutrition Follow-Up    RD Follow-up?: Yes

## 2024-04-18 NOTE — SUBJECTIVE & OBJECTIVE
Interval History: NAEON.  Afebrile, HD stable. Patient without BM with one episode of vomiting overnight 4/17. KUB with bowel gas and low concern for obstruction with no transition point noted. Escalating bowel regimen.    Review of Systems  Objective:     Vital Signs (Most Recent):  Temp: 98.5 °F (36.9 °C) (04/18/24 1133)  Pulse: 109 (04/18/24 1512)  Resp: 13 (04/18/24 1150)  BP: 124/77 (04/18/24 1133)  SpO2: 100 % (04/18/24 1150) Vital Signs (24h Range):  Temp:  [98.5 °F (36.9 °C)-100.5 °F (38.1 °C)] 98.5 °F (36.9 °C)  Pulse:  [105-118] 109  Resp:  [13-19] 13  SpO2:  [97 %-100 %] 100 %  BP: (119-130)/(77-91) 124/77     Weight: 122.5 kg (270 lb 1 oz)  Body mass index is 42.3 kg/m².    Intake/Output Summary (Last 24 hours) at 4/18/2024 1523  Last data filed at 4/18/2024 0705  Gross per 24 hour   Intake 50 ml   Output --   Net 50 ml         Physical Exam  Vitals and nursing note reviewed.   Constitutional:       General: She is not in acute distress.     Appearance: She is not ill-appearing, toxic-appearing or diaphoretic.      Comments: Trach in place, no purulence    HENT:      Head: Normocephalic and atraumatic.      Right Ear: External ear normal.      Left Ear: External ear normal.      Mouth/Throat:      Mouth: Mucous membranes are moist.      Pharynx: No oropharyngeal exudate.   Eyes:      General: No scleral icterus.     Extraocular Movements: Extraocular movements intact.      Pupils: Pupils are equal, round, and reactive to light.   Cardiovascular:      Rate and Rhythm: Normal rate and regular rhythm.      Pulses: Normal pulses.      Heart sounds: Normal heart sounds. No murmur heard.  Pulmonary:      Effort: Pulmonary effort is normal. No respiratory distress.      Breath sounds: Normal breath sounds. No wheezing or rales.   Abdominal:      General: Abdomen is flat. Bowel sounds are normal. There is no distension.      Palpations: Abdomen is soft. There is no mass.      Tenderness: There is no abdominal  tenderness.      Comments: Peg tube in place, no purulence noted   Genitourinary:     Comments: Has large bandage over groin area. Pictures in chart  Musculoskeletal:         General: No swelling or tenderness. Normal range of motion.      Cervical back: Normal range of motion and neck supple.      Right lower leg: No edema.      Left lower leg: No edema.      Comments: Moves BUE spontaneously   Skin:     General: Skin is warm and dry.      Capillary Refill: Capillary refill takes less than 2 seconds.      Findings: Wound present.   Neurological:      General: No focal deficit present.      Mental Status: She is alert and oriented to person, place, and time. Mental status is at baseline.      Comments: Nonverbal   Psychiatric:         Mood and Affect: Mood normal.         Behavior: Behavior normal.      Comments: Nonverbal             Significant Labs: All pertinent labs within the past 24 hours have been reviewed.    Significant Imaging: I have reviewed all pertinent imaging results/findings within the past 24 hours.

## 2024-04-18 NOTE — ASSESSMENT & PLAN NOTE
Nutrition consulted. Most recent weight and BMI monitored-     Measurements:  Wt Readings from Last 1 Encounters:   04/18/24 122.5 kg (270 lb 1 oz)   Body mass index is 42.3 kg/m².    Patient has been screened and assessed by RD.    Malnutrition Type:  Context: acute illness or injury  Level: moderate    Malnutrition Characteristic Summary:  Weight Loss (Malnutrition): 10% in 6 months  Subcutaneous Fat (Malnutrition): mild depletion  Muscle Mass (Malnutrition): mild depletion  Fluid Accumulation (Malnutrition): mild    Interventions/Recommendations (treatment strategy):  1.

## 2024-04-18 NOTE — ASSESSMENT & PLAN NOTE
53 y.o. Black or  Female ESRD-HD M-W-F at [facility] presents to ED on 4/10/2024 with UTI.    Of note, the patient was initiated on RRT in February 2024 after being admitted with ALVARADO 2/2 iATN due to septic shock. Perm cath placed on 2/29/24. She was transferred to [facility] on 3/12. Deemed ESRD at [facility].  Nephrology consulted for inpatient ESRD-HD management    Assessment:   - Dialysis for metabolic clearance and volume management will be provided tomorrow AM.  - Continue to monitor intake and output  - Please avoid gadolinium, fleets, phos-based laxatives, NSAIDs  - Dialysis thrice weekly unless more urgent indications arise. Will evaluate RRT requirements Daily.    Anemia of ESRD   Recent Labs   Lab 04/16/24  0418 04/17/24  0634 04/18/24  0806   WBC 12.00 12.68 11.30   HGB 9.4* 9.2* 9.3*   HCT 30.4* 29.3* 31.0*    231 220       Lab Results   Component Value Date    FESATURATED 10 (L) 03/15/2024    FERRITIN 414 (H) 03/15/2024       - Goal in ESRD is Hgb of 10-11.   - No EPO    Mineral Bone Disease in ESRD   Lab Results   Component Value Date    CALCIUM 10.0 04/17/2024    ALBUMIN 3.1 (L) 04/14/2024    CAION 1.12 02/21/2024    PHOS 4.2 04/17/2024       - F/U PO4, Mg, Calcium. And albumin levels daily.   - Renal diet with protein intake goal 1.5 g/kg/d with 1 L fluid restriction   - Phos 4.2, continue Sevelamer.

## 2024-04-19 ENCOUNTER — OFFICE VISIT (OUTPATIENT)
Dept: DIALYSIS | Facility: HOSPITAL | Age: 54
DRG: 689 | End: 2024-04-19
Attending: EMERGENCY MEDICINE
Payer: MEDICAID

## 2024-04-19 LAB
ALBUMIN SERPL BCP-MCNC: 3 G/DL (ref 3.5–5.2)
ANION GAP SERPL CALC-SCNC: 15 MMOL/L (ref 8–16)
BUN SERPL-MCNC: 56 MG/DL (ref 6–20)
CALCIUM SERPL-MCNC: 10.2 MG/DL (ref 8.7–10.5)
CHLORIDE SERPL-SCNC: 97 MMOL/L (ref 95–110)
CO2 SERPL-SCNC: 20 MMOL/L (ref 23–29)
CREAT SERPL-MCNC: 5.4 MG/DL (ref 0.5–1.4)
EST. GFR  (NO RACE VARIABLE): 8.9 ML/MIN/1.73 M^2
GLUCOSE SERPL-MCNC: 144 MG/DL (ref 70–110)
MAGNESIUM SERPL-MCNC: 2.4 MG/DL (ref 1.6–2.6)
PHOSPHATE SERPL-MCNC: 4.6 MG/DL (ref 2.7–4.5)
POCT GLUCOSE: 112 MG/DL (ref 70–110)
POCT GLUCOSE: 137 MG/DL (ref 70–110)
POCT GLUCOSE: 145 MG/DL (ref 70–110)
POCT GLUCOSE: 150 MG/DL (ref 70–110)
POCT GLUCOSE: 153 MG/DL (ref 70–110)
POCT GLUCOSE: 168 MG/DL (ref 70–110)
POTASSIUM SERPL-SCNC: 4.3 MMOL/L (ref 3.5–5.1)
SODIUM SERPL-SCNC: 132 MMOL/L (ref 136–145)

## 2024-04-19 PROCEDURE — 63600175 PHARM REV CODE 636 W HCPCS: Performed by: NURSE PRACTITIONER

## 2024-04-19 PROCEDURE — 99900022

## 2024-04-19 PROCEDURE — 3066F NEPHROPATHY DOC TX: CPT | Mod: CPTII,,, | Performed by: NURSE PRACTITIONER

## 2024-04-19 PROCEDURE — 25000003 PHARM REV CODE 250

## 2024-04-19 PROCEDURE — 83735 ASSAY OF MAGNESIUM: CPT

## 2024-04-19 PROCEDURE — 27000207 HC ISOLATION

## 2024-04-19 PROCEDURE — 27000221 HC OXYGEN, UP TO 24 HOURS

## 2024-04-19 PROCEDURE — 3046F HEMOGLOBIN A1C LEVEL >9.0%: CPT | Mod: CPTII,,, | Performed by: NURSE PRACTITIONER

## 2024-04-19 PROCEDURE — 5A1D70Z PERFORMANCE OF URINARY FILTRATION, INTERMITTENT, LESS THAN 6 HOURS PER DAY: ICD-10-PCS | Performed by: HOSPITALIST

## 2024-04-19 PROCEDURE — 25000003 PHARM REV CODE 250: Performed by: NURSE PRACTITIONER

## 2024-04-19 PROCEDURE — 20600001 HC STEP DOWN PRIVATE ROOM

## 2024-04-19 PROCEDURE — 90935 HEMODIALYSIS ONE EVALUATION: CPT | Mod: ,,, | Performed by: NURSE PRACTITIONER

## 2024-04-19 PROCEDURE — 36415 COLL VENOUS BLD VENIPUNCTURE: CPT

## 2024-04-19 PROCEDURE — 99900026 HC AIRWAY MAINTENANCE (STAT)

## 2024-04-19 PROCEDURE — 25000003 PHARM REV CODE 250: Performed by: STUDENT IN AN ORGANIZED HEALTH CARE EDUCATION/TRAINING PROGRAM

## 2024-04-19 PROCEDURE — 80069 RENAL FUNCTION PANEL: CPT

## 2024-04-19 PROCEDURE — 80100016 HC MAINTENANCE HEMODIALYSIS

## 2024-04-19 PROCEDURE — 94761 N-INVAS EAR/PLS OXIMETRY MLT: CPT

## 2024-04-19 PROCEDURE — 99900035 HC TECH TIME PER 15 MIN (STAT)

## 2024-04-19 PROCEDURE — 63600175 PHARM REV CODE 636 W HCPCS

## 2024-04-19 RX ORDER — AMOXICILLIN 250 MG
1 CAPSULE ORAL 2 TIMES DAILY
Status: DISCONTINUED | OUTPATIENT
Start: 2024-04-19 | End: 2024-05-11

## 2024-04-19 RX ORDER — MIDODRINE HYDROCHLORIDE 5 MG/1
10 TABLET ORAL
Status: COMPLETED | OUTPATIENT
Start: 2024-04-19 | End: 2024-04-19

## 2024-04-19 RX ADMIN — INSULIN ASPART 4 UNITS: 100 INJECTION, SOLUTION INTRAVENOUS; SUBCUTANEOUS at 06:04

## 2024-04-19 RX ADMIN — MIDODRINE HYDROCHLORIDE 10 MG: 5 TABLET ORAL at 09:04

## 2024-04-19 RX ADMIN — HEPARIN SODIUM 7500 UNITS: 5000 INJECTION INTRAVENOUS; SUBCUTANEOUS at 02:04

## 2024-04-19 RX ADMIN — SODIUM CHLORIDE: 9 INJECTION, SOLUTION INTRAVENOUS at 08:04

## 2024-04-19 RX ADMIN — INSULIN ASPART 4 UNITS: 100 INJECTION, SOLUTION INTRAVENOUS; SUBCUTANEOUS at 02:04

## 2024-04-19 RX ADMIN — Medication 500 MG: at 08:04

## 2024-04-19 RX ADMIN — INSULIN ASPART 4 UNITS: 100 INJECTION, SOLUTION INTRAVENOUS; SUBCUTANEOUS at 12:04

## 2024-04-19 RX ADMIN — ERYTHROPOIETIN 6100 UNITS: 10000 INJECTION, SOLUTION INTRAVENOUS; SUBCUTANEOUS at 09:04

## 2024-04-19 RX ADMIN — HEPARIN SODIUM 3000 UNITS: 1000 INJECTION, SOLUTION INTRAVENOUS; SUBCUTANEOUS at 08:04

## 2024-04-19 RX ADMIN — SEVELAMER CARBONATE 0.8 G: 2400 POWDER, FOR SUSPENSION ORAL at 08:04

## 2024-04-19 RX ADMIN — INSULIN DETEMIR 27 UNITS: 100 INJECTION, SOLUTION SUBCUTANEOUS at 09:04

## 2024-04-19 RX ADMIN — INSULIN DETEMIR 27 UNITS: 100 INJECTION, SOLUTION SUBCUTANEOUS at 08:04

## 2024-04-19 RX ADMIN — SEVELAMER CARBONATE 0.8 G: 2400 POWDER, FOR SUSPENSION ORAL at 02:04

## 2024-04-19 RX ADMIN — HEPARIN SODIUM 7500 UNITS: 5000 INJECTION INTRAVENOUS; SUBCUTANEOUS at 09:04

## 2024-04-19 RX ADMIN — HEPARIN SODIUM 7500 UNITS: 5000 INJECTION INTRAVENOUS; SUBCUTANEOUS at 06:04

## 2024-04-19 NOTE — PROGRESS NOTES
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 History was given from patient's daughter. She was undergoing dialysis today and noticed she was lethargic, less alert than usual self. Pt completed dialysis and still not acting herself. EMS was called, fever of a 100.  On chart review, she did have an episode of large volume emesis around 1700.  Per EMS, she had a slightly elevated temp 100.0°F, glucose 300s.     In the ED: UA 2+ leuks, >100 WBC, many bacteria, WBC 17, CT abd/pelvis concerning for cystitis. Given vanc/zosyn    Overview/Hospital Course:  Patient was admitted to Hospital Medicine service for medical management and evaluation of urosepsis. Patient was continued on vanc/zosyn. Vascular Neurology consulted concerning mental status change with the recommendation to initiate ASA 81 QD and to obtain an MRI to r/o new stroke. Imaging pending. Nephrology was consulted for regularly scheduled dialysis. Afternoon of 4/12, patient was unable to tolerate filtration of volume during dialsis 2/2 hypotension. Patient received 500cc bolus and was transferred back to floor after finishing the session without volume removed. Will monitor for signs of volume overload. Tailoring insulin regimen while uptitrating tube feeds to goal rate. Staph Epi in all 4 bottles; ID with recommendation to continue Vanc & de-escalate meropenem to ertapenem on 04/15 through 4/16. Patient completed IV course of UTI coverage. Patient tolerated volume removal well in dialysis  throughout the rest of her hospital stay. Pending discharge to LTACH pending bed availability. Patient without BM with one episode of vomiting overnight 4/17. KUB with bowel gas and low concern for obstruction with no transition point noted. Escalating bowel regimen.    Interval History: NAEO. Underwent HD today. Multiple BM's overnight. Patient will dispo to SNF instead of LTAC. PT/OT orders in place.    Review of Systems  Objective:     Vital Signs (Most Recent):  Temp: 99.2 °F (37.3 °C) (04/19/24 0700)  Pulse: 109 (04/19/24 1130)  Resp: 19 (04/19/24 0745)  BP: 116/76 (04/19/24 1130)  SpO2: 100 % (04/19/24 0900) Vital Signs (24h Range):  Temp:  [99.2 °F (37.3 °C)-100 °F (37.8 °C)] 99.2 °F (37.3 °C)  Pulse:  [104-128] 109  Resp:  [8-22] 19  SpO2:  [96 %-100 %] 100 %  BP: ()/(56-90) 116/76     Weight: 122.5 kg (270 lb 1 oz)  Body mass index is 42.3 kg/m².    Intake/Output Summary (Last 24 hours) at 4/19/2024 1157  Last data filed at 4/18/2024 1800  Gross per 24 hour   Intake 820 ml   Output --   Net 820 ml         Physical Exam  Vitals and nursing note reviewed.   Constitutional:       General: She is not in acute distress.     Appearance: She is not ill-appearing, toxic-appearing or diaphoretic.      Comments: Trach in place, no purulence    HENT:      Head: Normocephalic and atraumatic.      Right Ear: External ear normal.      Left Ear: External ear normal.      Mouth/Throat:      Mouth: Mucous membranes are moist.      Pharynx: No oropharyngeal exudate.   Eyes:      General: No scleral icterus.     Extraocular Movements: Extraocular movements intact.      Pupils: Pupils are equal, round, and reactive to light.   Cardiovascular:      Rate and Rhythm: Normal rate and regular rhythm.      Pulses: Normal pulses.      Heart sounds: Normal heart sounds. No murmur heard.  Pulmonary:      Effort: Pulmonary effort is normal. No respiratory distress.      Breath sounds: Normal breath sounds. No wheezing or rales.    Abdominal:      General: Abdomen is flat. Bowel sounds are normal. There is no distension.      Palpations: Abdomen is soft. There is no mass.      Tenderness: There is no abdominal tenderness.      Comments: Peg tube in place, no purulence noted   Genitourinary:     Comments: Has large bandage over groin area. Pictures in chart  Musculoskeletal:         General: No swelling or tenderness. Normal range of motion.      Cervical back: Normal range of motion and neck supple.      Right lower leg: No edema.      Left lower leg: No edema.      Comments: Moves BUE spontaneously   Skin:     General: Skin is warm and dry.      Capillary Refill: Capillary refill takes less than 2 seconds.      Findings: Wound present.   Neurological:      General: No focal deficit present.      Mental Status: She is alert and oriented to person, place, and time. Mental status is at baseline.      Comments: Nonverbal   Psychiatric:         Mood and Affect: Mood normal.         Behavior: Behavior normal.      Comments: Nonverbal             Significant Labs: All pertinent labs within the past 24 hours have been reviewed.    Significant Imaging: I have reviewed all pertinent imaging results/findings within the past 24 hours.    Assessment/Plan:      * Acute cystitis with hematuria  Pt presenting with AMS and worsening lethargy. Pt is non-verbal at baseline, does not follow commands, but was less responsive than normal. Pt found to have a fever. Urinary source suspected based off of urine and CT abd/pelvis. Pt has many prior resistant bacterial infections including urinary sources. Has prior with sensitivity to zosyn and has also improved off ED dose of vanc/zosyn. Lactic elevated a 3.8->3.49 prior to completion of fluid bolus 500ml    Plan  S/p vanc/merrem, now transitioned to vanc/ertapenem (on 04/15) per ID. Course completed 4/16.  ID consulted  F/u blood cultures (4/4 w/ staph epi)  UC w/ proteus; sensitive to erta  Daily cbc  Contact  precautions    Constipation  Patient without BM x5d. One episode of vomitus night of 4/17. Some concern for bowel obstruction. Tolerating continuous tube feeds at goal rate with 0cc on residuals. Physical exam with bowel sounds, soft nontender abdomen. KUB without concern for obstruction with bowel gas, lack of transition point.    4/18 Multiple BM overnight. Will keep current bowel regimen in place, scaling back if stool further output increases.    Plan  - escalating to Miralax BID, Senna BID  - One time mag citrate per PEG ordered  - compazine PRN  - please contact MD team on call with concern for worsening nausea or abdominal pain.    Moderate malnutrition  Nutrition consulted. Most recent weight and BMI monitored-     Measurements:  Wt Readings from Last 1 Encounters:   04/18/24 122.5 kg (270 lb 1 oz)   Body mass index is 42.3 kg/m².    Patient has been screened and assessed by RD.    Malnutrition Type:  Context: acute illness or injury  Level: moderate    Malnutrition Characteristic Summary:  Weight Loss (Malnutrition): 10% in 6 months  Subcutaneous Fat (Malnutrition): mild depletion  Muscle Mass (Malnutrition): mild depletion  Fluid Accumulation (Malnutrition): mild    Interventions/Recommendations (treatment strategy):  1.      Atelectasis  I have personally reviewed Chest X-ray. Patient with atelectasis on interpretation. Likely Non-Obstructive atelectasis secondary to immobility. Signs and symptoms include Hypoxemia Will begin treatment with upright positioning.    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 Physical therapy services has not been scheduled. Continue all standard measures for pressure injury prevention  and consult wound care for any wounds (chronic or acute).    ESRD (end stage renal disease) on dialysis  Creatine stable for now. BMP reviewed- noted Estimated Creatinine Clearance: 16.4 mL/min (A) (based on SCr of 5.4 mg/dL (H)). according to latest data. Based on current GFR, CKD stage is end stage.  Monitor UOP and serial BMP and adjust therapy as needed. Renally dose meds. Avoid nephrotoxic medications and procedures.    Pt MWF HD, underwent on 4/10, complete session  Plan  Nephrology consult  Monitor fluid status    Acute encephalopathy  Pt is non-verbal and does not follow commands at baseline. Vascular Neurology consulted given acute change from baseline. MRI with concern for evolution of prior strokes with noted differential of T2 hyperintensities including vasculitis vs demyelination. Discussed with Vascular Neurology; low concern for ongoing vasculitis/demyelination, likely represents typical evolution of prior infarcts.    Plan  - STAT head imaging for concern of new change in mental status/focal deficit    Type 2 diabetes mellitus with hyperglycemia, with long-term current use of insulin  Pt currently taking detemir 25 BID, and moderate sliding scale aspart. Glucose elevated in the 300s on arrival. 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 Pt underwent extensive course, currently still healing from this, but no longer has wound vac. Pt's wound currently covered with bandage. Picture in media tabs    Plan  Wound care        VTE Risk Mitigation (From admission, onward)           Ordered     heparin (porcine) injection 3,000 Units  As needed (PRN)          04/17/24 0825     heparin (porcine) injection 1,000 Units  As needed (PRN)         04/12/24 0951     heparin (porcine) injection 7,500 Units  Every 8 hours         04/11/24 0252                    Discharge Planning   ZEKE: 4/23/2024     Code Status: Full Code   Is the patient medically ready for discharge?: No    Reason for patient still in hospital (select all that apply): Treatment  Discharge Plan A: Long-term acute care facility (LTAC)                  Juan A Myers MD  Department of Hospital Medicine   [facility] - Telemetry Stepdown

## 2024-04-19 NOTE — NURSING
Patient URIEL to dialysis between 0749-2226.  Upon return to unit VSS, NDN, RT at bedside.  Peg tube dgs cdi, 20ml residuals, flushed with 50ml water, feedings resumed. Patient at 45 degree angle in bed, family at bedside.

## 2024-04-19 NOTE — PROGRESS NOTES
04/19/24 1144   Post-Hemodialysis Assessment   Blood Volume Processed (Liters) 57.8 L   Dialyzer Clearance Lightly streaked   Duration of Treatment 210 minutes   Total UF (mL) 700 mL   Net Fluid Removal 0     HD tx completed, blood returned and PC heparin locked without issue. Pt rec'd one time dose midodrine per NP, BP responded well. Report called to primary RN. Pt back to unit via bed.

## 2024-04-19 NOTE — PLAN OF CARE
"0900 CM sent a secure chat to Kathe Adan, with [facility] for update on bed availability. CM awaiting a response.     0904 CM left a voicemail Kathy at [facility] [phone] for status on bed availability and sent message via [employer]. CM awaiting a response.     0910 Kathe, responded via secure chat "Good morning, I do not think we will have a bed today. Will know more once our planned discharges leave.     0918   CM spoke  with patient's daughter  to review discharge recommendation of LTAC and is agreeable to plan. Updated her on LTAC placement status and made aware that the patient is medically ready for discharge,  doesn't have accepting facilities and CM will need additional referrals     Patient/family provided list of facilities in-network with patient's payor plan. Providers that are owned, operated, or affiliated with [facility] are included on the list.     Notified that referral sent to below listed facilities from in-network list based on proximity to home/family support:   [facility]       Patient/family instructed to identify preference.    Preferred Facility: (if more than 1, listed in order of descending preference)  [facility] [place]     If an additional preferred facility not listed above is identified, additional referral to be sent. If above facilities unable to accept, will send additional referrals to in-network providers.        Discharge Plan A and Plan B have been determined by review of patient's clinical status, future medical and therapeutic needs, and coverage/benefits for post-acute care in coordination with multidisciplinary team members.    1013 CM contacted Dr. Sadler to review LTAC. Patient is no longer on IV abx and will not qualify for LTAC criteria. MD stated agreeable to CM starting SNF placement.     CM advised MD to order PT/OT      "

## 2024-04-19 NOTE — CARE UPDATE
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    Last VS: /85 (BP Location: Right arm, Patient Position: Lying)   Pulse (!) 120   Temp 99.7 °F (37.6 °C) (Oral)   Resp (!) 22   Ht 5' 7" (1.702 m)   Wt 122.5 kg (270 lb 1 oz)   SpO2 100%   BMI 42.30 kg/m²     24H Vital Sign Range:  Temp:  [98.5 °F (36.9 °C)-99.8 °F (37.7 °C)]   Pulse:  [105-120]   Resp:  [8-22]   BP: (121-130)/(77-91)   SpO2:  [96 %-100 %]     Level of Consciousness (AVPU): responds to voice    Recent Labs     04/16/24  0418 04/17/24  0634 04/18/24  0806   WBC 12.00 12.68 11.30   HGB 9.4* 9.2* 9.3*   HCT 30.4* 29.3* 31.0*    231 220       Recent Labs     04/16/24  0418 04/17/24  0634   * 130*   K 3.7 4.1   CL 92* 92*   CO2 23 23   BUN 46* 61*   CREATININE 5.0* 6.0*   * 141*   PHOS 2.7 4.2   MG 2.1 2.2       OXYGEN:  Flow (L/min): 5  Oxygen Concentration (%): 28     MEWS score: 1     Rounding completed with charge ELISABETH Silveira reports no additional concerns verbalized at this time. Instructed to call [phone] for further concerns or assistance.    Deep Rea RN        "

## 2024-04-19 NOTE — PROGRESS NOTES
0800 Arrived to [facility] via bed in NAD    0811 Maintenance HD started via R chest PC without issue

## 2024-04-19 NOTE — CARE UPDATE
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    Last VS: /76 (BP Location: Left arm, Patient Position: Lying)   Pulse (!) 113   Temp 100 °F (37.8 °C) (Oral)   Resp 18   Ht 5' 7" (1.702 m)   Wt 122.5 kg (270 lb 1 oz)   SpO2 99%   BMI 42.30 kg/m²     24H Vital Sign Range:  Temp:  [98.5 °F (36.9 °C)-100 °F (37.8 °C)]   Pulse:  [105-120]   Resp:  [8-22]   BP: (110-132)/(76-85)   SpO2:  [96 %-100 %]     Level of Consciousness (AVPU): responds to voice    Recent Labs     04/17/24  0634 04/18/24  0806   WBC 12.68 11.30   HGB 9.2* 9.3*   HCT 29.3* 31.0*    220       Recent Labs     04/17/24  0634 04/19/24  0411   * 132*   K 4.1 4.3   CL 92* 97   CO2 23 20*   BUN 61* 56*   CREATININE 6.0* 5.4*   * 144*   PHOS 4.2 4.6*   MG 2.2 2.4        OXYGEN:  Flow (L/min): 5  Oxygen Concentration (%): 28       MEWS score: 4    Rounding completed with charge ELISABETH Hampton reports Patient tachycardic at baseline. No additional concerns verbalized at this time. Instructed to call [phone] for further concerns or assistance.    Wilian Bustos RN       "

## 2024-04-19 NOTE — SUBJECTIVE & OBJECTIVE
Interval History: NAEO. Underwent HD today. Multiple BM's overnight. Patient will dispo to SNF instead of LTAC. PT/OT orders in place.    Review of Systems  Objective:     Vital Signs (Most Recent):  Temp: 99.2 °F (37.3 °C) (04/19/24 0700)  Pulse: 109 (04/19/24 1130)  Resp: 19 (04/19/24 0745)  BP: 116/76 (04/19/24 1130)  SpO2: 100 % (04/19/24 0900) Vital Signs (24h Range):  Temp:  [99.2 °F (37.3 °C)-100 °F (37.8 °C)] 99.2 °F (37.3 °C)  Pulse:  [104-128] 109  Resp:  [8-22] 19  SpO2:  [96 %-100 %] 100 %  BP: ()/(56-90) 116/76     Weight: 122.5 kg (270 lb 1 oz)  Body mass index is 42.3 kg/m².    Intake/Output Summary (Last 24 hours) at 4/19/2024 1157  Last data filed at 4/18/2024 1800  Gross per 24 hour   Intake 820 ml   Output --   Net 820 ml         Physical Exam  Vitals and nursing note reviewed.   Constitutional:       General: She is not in acute distress.     Appearance: She is not ill-appearing, toxic-appearing or diaphoretic.      Comments: Trach in place, no purulence    HENT:      Head: Normocephalic and atraumatic.      Right Ear: External ear normal.      Left Ear: External ear normal.      Mouth/Throat:      Mouth: Mucous membranes are moist.      Pharynx: No oropharyngeal exudate.   Eyes:      General: No scleral icterus.     Extraocular Movements: Extraocular movements intact.      Pupils: Pupils are equal, round, and reactive to light.   Cardiovascular:      Rate and Rhythm: Normal rate and regular rhythm.      Pulses: Normal pulses.      Heart sounds: Normal heart sounds. No murmur heard.  Pulmonary:      Effort: Pulmonary effort is normal. No respiratory distress.      Breath sounds: Normal breath sounds. No wheezing or rales.   Abdominal:      General: Abdomen is flat. Bowel sounds are normal. There is no distension.      Palpations: Abdomen is soft. There is no mass.      Tenderness: There is no abdominal tenderness.      Comments: Peg tube in place, no purulence noted   Genitourinary:      Comments: Has large bandage over groin area. Pictures in chart  Musculoskeletal:         General: No swelling or tenderness. Normal range of motion.      Cervical back: Normal range of motion and neck supple.      Right lower leg: No edema.      Left lower leg: No edema.      Comments: Moves BUE spontaneously   Skin:     General: Skin is warm and dry.      Capillary Refill: Capillary refill takes less than 2 seconds.      Findings: Wound present.   Neurological:      General: No focal deficit present.      Mental Status: She is alert and oriented to person, place, and time. Mental status is at baseline.      Comments: Nonverbal   Psychiatric:         Mood and Affect: Mood normal.         Behavior: Behavior normal.      Comments: Nonverbal             Significant Labs: All pertinent labs within the past 24 hours have been reviewed.    Significant Imaging: I have reviewed all pertinent imaging results/findings within the past 24 hours.

## 2024-04-19 NOTE — PLAN OF CARE
Problem: Infection  Goal: Absence of Infection Signs and Symptoms  Outcome: Ongoing, Progressing     Problem: Skin Injury Risk Increased  Goal: Skin Health and Integrity  Outcome: Ongoing, Progressing     Problem: Adult Inpatient Plan of Care  Goal: Plan of Care Review  Outcome: Ongoing, Progressing  Goal: Patient-Specific Goal (Individualized)  Outcome: Ongoing, Progressing  Goal: Absence of Hospital-Acquired Illness or Injury  Outcome: Ongoing, Progressing  Goal: Optimal Comfort and Wellbeing  Outcome: Ongoing, Progressing  Goal: Readiness for Transition of Care  Outcome: Ongoing, Progressing     Problem: Bariatric Environmental Safety  Goal: Safety Maintained with Care  Outcome: Ongoing, Progressing     Problem: Device-Related Complication Risk (Hemodialysis)  Goal: Safe, Effective Therapy Delivery  Outcome: Ongoing, Progressing     Problem: Hemodynamic Instability (Hemodialysis)  Goal: Effective Tissue Perfusion  Outcome: Ongoing, Progressing     Problem: Infection (Hemodialysis)  Goal: Absence of Infection Signs and Symptoms  Outcome: Ongoing, Progressing     Problem: Diabetes Comorbidity  Goal: Blood Glucose Level Within Targeted Range  Outcome: Ongoing, Progressing     Problem: Adjustment to Illness (Sepsis/Septic Shock)  Goal: Optimal Coping  Outcome: Ongoing, Progressing     Problem: Bleeding (Sepsis/Septic Shock)  Goal: Absence of Bleeding  Outcome: Ongoing, Progressing     Problem: Glycemic Control Impaired (Sepsis/Septic Shock)  Goal: Blood Glucose Level Within Desired Range  Outcome: Ongoing, Progressing     Problem: Infection Progression (Sepsis/Septic Shock)  Goal: Absence of Infection Signs and Symptoms  Outcome: Ongoing, Progressing     Problem: Nutrition Impaired (Sepsis/Septic Shock)  Goal: Optimal Nutrition Intake  Outcome: Ongoing, Progressing     Problem: Fall Injury Risk  Goal: Absence of Fall and Fall-Related Injury  Outcome: Ongoing, Progressing     Problem: Adjustment to Illness (Chronic  Kidney Disease)  Goal: Optimal Coping with Chronic Illness  Outcome: Ongoing, Progressing     Problem: Electrolyte Imbalance (Chronic Kidney Disease)  Goal: Electrolyte Balance  Outcome: Ongoing, Progressing     Problem: Fluid Volume Excess (Chronic Kidney Disease)  Goal: Fluid Balance  Outcome: Ongoing, Progressing     Problem: Functional Decline (Chronic Kidney Disease)  Goal: Optimal Functional Ability  Outcome: Ongoing, Progressing     Problem: Hematologic Alteration (Chronic Kidney Disease)  Goal: Absence of Anemia Signs and Symptoms  Outcome: Ongoing, Progressing     Problem: Oral Intake Inadequate (Chronic Kidney Disease)  Goal: Optimal Oral Intake  Outcome: Ongoing, Progressing     Problem: Pain (Chronic Kidney Disease)  Goal: Acceptable Pain Control  Outcome: Ongoing, Progressing     Problem: Renal Function Impairment (Chronic Kidney Disease)  Goal: Minimize Renal Failure Effects  Outcome: Ongoing, Progressing

## 2024-04-19 NOTE — NURSING
End of shift note.  Peg tube feed continuous at goal rate of 40, residuals checked see FS for output, free water flushes given.  Peg dsg changed.  Patient repositioned, wipe down bath given, attempted to perform oral care patient unreceptive.    MD aware of lack of BM, orders for medication placed and administered.  Report given to oncoming nurse.

## 2024-04-19 NOTE — ASSESSMENT & PLAN NOTE
Patient without BM x5d. One episode of vomitus night of 4/17. Some concern for bowel obstruction. Tolerating continuous tube feeds at goal rate with 0cc on residuals. Physical exam with bowel sounds, soft nontender abdomen. KUB without concern for obstruction with bowel gas, lack of transition point.    4/18 Multiple BM overnight. Will keep current bowel regimen in place, scaling back if stool further output increases.    Plan  - escalating to Miralax BID, Senna BID  - One time mag citrate per PEG ordered  - compazine PRN  - please contact MD team on call with concern for worsening nausea or abdominal pain.

## 2024-04-19 NOTE — ASSESSMENT & PLAN NOTE
Creatine stable for now. BMP reviewed- noted Estimated Creatinine Clearance: 16.4 mL/min (A) (based on SCr of 5.4 mg/dL (H)). according to latest data. Based on current GFR, CKD stage is end stage.  Monitor UOP and serial BMP and adjust therapy as needed. Renally dose meds. Avoid nephrotoxic medications and procedures.    Pt MWF HD, underwent on 4/10, complete session  Plan  Nephrology consult  Monitor fluid status

## 2024-04-19 NOTE — RESPIRATORY THERAPY
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  Clinically Significant Surgical Hx: tracheostomy    24 Hours Vitals Range:  Temp:  [98.5 °F (36.9 °C)-100 °F (37.8 °C)]   Pulse:  [104-128]   Resp:  [8-22]   BP: ()/(56-85)   SpO2:  [96 %-100 %]     Labs:    Recent Labs     24  0634 24  0411   * 132*   K 4.1 4.3   CL 92* 97   CO2 23 20*   BUN 61* 56*   CREATININE 6.0* 5.4*   * 144*   PHOS 4.2 4.6*   MG 2.2 2.4        No results for input(s): "PH", "PCO2", "PO2", "HCO3", "POCSATURATED", "BE" in the last 72 hours.    ASSESSMENT/INTERVENTIONS  All supplies available. No respiratory needs at this time.       Last VS   Temp: 99.2 °F (37.3 °C) ( 0700)  Pulse: 112 ( 1000)  Resp: 19 ( 0745)  BP: 111/83 ( 1000)  SpO2: 100 % ( 0900)      Extra trachs at bedside: yes 4,6 shiley  Level of Consciousness: Level of Consciousness (AVPU): responds to voice  Respiratory Effort: Respiratory Effort: Normal, Unlabored Expansion/Accessory Muscle Usage: Expansion/Accessory Muscles/Retractions: no retractions, no use of accessory muscles  All Lung Field Breath Sounds: All Lung Fields Breath Sounds: Anterior:, coarse  JOSE Breath Sounds: diminished  LLL Breath Sounds: diminished  RUL Breath Sounds: diminished  RML Breath Sounds: diminished  RLL Breath Sounds: diminished  O2 Device/Concentration: RA  Surgical airway: Yes, Type: Valerialey Size: 6, uncuffed  Ambu at bedside:       Active Orders   Respiratory Care    Oxygen Continuous     Frequency: Continuous     Number of Occurrences: Until Specified     Order Questions:      Device type: Low flow    "   Device: Trach Collar      FiO2%: 28%      Titrate O2 per Oxygen Titration Protocol: Yes      To maintain SpO2 goal of: >= 90%      Notify MD of: Inability to achieve desired SpO2; Sudden change in patient status and requires 20% increase in FiO2; Patient requires >60% FiO2    Pulse Oximetry Continuous     Frequency: Continuous     Number of Occurrences: Until Specified    Routine tracheostomy care     Frequency: BID     Number of Occurrences: Until Specified       RECOMMENDATIONS    We recommend: RRT Recs: Continue POC per primary team.      FOLLOW-UP    Please call back the Rapid Response RT, Di Shi, RRT at x [phone] for any questions or concerns.

## 2024-04-19 NOTE — NURSING
0701: Report received. Patient asleep in bed, respirations even and unlabored, symmetrical chest expansion noted.  Continuous tube feedings in progress, RA, PIV saline locked.  No BM or UOP noted upon assessment.  VSS, NDN.

## 2024-04-20 LAB
POCT GLUCOSE: 135 MG/DL (ref 70–110)
POCT GLUCOSE: 143 MG/DL (ref 70–110)
POCT GLUCOSE: 147 MG/DL (ref 70–110)
POCT GLUCOSE: 171 MG/DL (ref 70–110)
POCT GLUCOSE: 178 MG/DL (ref 70–110)

## 2024-04-20 PROCEDURE — 63600175 PHARM REV CODE 636 W HCPCS

## 2024-04-20 PROCEDURE — 20600001 HC STEP DOWN PRIVATE ROOM

## 2024-04-20 PROCEDURE — 25000003 PHARM REV CODE 250

## 2024-04-20 PROCEDURE — 25000003 PHARM REV CODE 250: Performed by: INTERNAL MEDICINE

## 2024-04-20 PROCEDURE — 99900035 HC TECH TIME PER 15 MIN (STAT)

## 2024-04-20 PROCEDURE — 99900026 HC AIRWAY MAINTENANCE (STAT)

## 2024-04-20 PROCEDURE — 25000003 PHARM REV CODE 250: Performed by: STUDENT IN AN ORGANIZED HEALTH CARE EDUCATION/TRAINING PROGRAM

## 2024-04-20 PROCEDURE — 27000207 HC ISOLATION

## 2024-04-20 PROCEDURE — 25000242 PHARM REV CODE 250 ALT 637 W/ HCPCS

## 2024-04-20 PROCEDURE — 94761 N-INVAS EAR/PLS OXIMETRY MLT: CPT

## 2024-04-20 PROCEDURE — 27200966 HC CLOSED SUCTION SYSTEM

## 2024-04-20 RX ORDER — SODIUM CHLORIDE 9 MG/ML
INJECTION, SOLUTION INTRAVENOUS ONCE
Status: COMPLETED | OUTPATIENT
Start: 2024-04-22 | End: 2024-04-22

## 2024-04-20 RX ADMIN — SEVELAMER CARBONATE 0.8 G: 2400 POWDER, FOR SUSPENSION ORAL at 10:04

## 2024-04-20 RX ADMIN — INSULIN ASPART 2 UNITS: 100 INJECTION, SOLUTION INTRAVENOUS; SUBCUTANEOUS at 01:04

## 2024-04-20 RX ADMIN — INSULIN ASPART 4 UNITS: 100 INJECTION, SOLUTION INTRAVENOUS; SUBCUTANEOUS at 06:04

## 2024-04-20 RX ADMIN — ZINC SULFATE 220 MG (50 MG) CAPSULE 220 MG: CAPSULE at 10:04

## 2024-04-20 RX ADMIN — INSULIN ASPART 4 UNITS: 100 INJECTION, SOLUTION INTRAVENOUS; SUBCUTANEOUS at 01:04

## 2024-04-20 RX ADMIN — POLYETHYLENE GLYCOL 3350 17 G: 17 POWDER, FOR SOLUTION ORAL at 10:04

## 2024-04-20 RX ADMIN — INSULIN ASPART 4 UNITS: 100 INJECTION, SOLUTION INTRAVENOUS; SUBCUTANEOUS at 11:04

## 2024-04-20 RX ADMIN — POLYETHYLENE GLYCOL 3350 17 G: 17 POWDER, FOR SOLUTION ORAL at 09:04

## 2024-04-20 RX ADMIN — DOCUSATE SODIUM AND SENNOSIDES 1 TABLET: 8.6; 5 TABLET, FILM COATED ORAL at 09:04

## 2024-04-20 RX ADMIN — HEPARIN SODIUM 7500 UNITS: 5000 INJECTION INTRAVENOUS; SUBCUTANEOUS at 09:04

## 2024-04-20 RX ADMIN — PSYLLIUM HUSK 1 PACKET: 3.4 POWDER ORAL at 09:04

## 2024-04-20 RX ADMIN — SEVELAMER CARBONATE 0.8 G: 2400 POWDER, FOR SUSPENSION ORAL at 09:04

## 2024-04-20 RX ADMIN — ASPIRIN 81 MG CHEWABLE TABLET 81 MG: 81 TABLET CHEWABLE at 10:04

## 2024-04-20 RX ADMIN — INSULIN ASPART 4 UNITS: 100 INJECTION, SOLUTION INTRAVENOUS; SUBCUTANEOUS at 05:04

## 2024-04-20 RX ADMIN — HEPARIN SODIUM 7500 UNITS: 5000 INJECTION INTRAVENOUS; SUBCUTANEOUS at 01:04

## 2024-04-20 RX ADMIN — Medication 500 MG: at 10:04

## 2024-04-20 RX ADMIN — PANTOPRAZOLE SODIUM 40 MG: 40 GRANULE, DELAYED RELEASE ORAL at 10:04

## 2024-04-20 RX ADMIN — DOCUSATE SODIUM AND SENNOSIDES 1 TABLET: 8.6; 5 TABLET, FILM COATED ORAL at 10:04

## 2024-04-20 RX ADMIN — INSULIN DETEMIR 27 UNITS: 100 INJECTION, SOLUTION SUBCUTANEOUS at 10:04

## 2024-04-20 RX ADMIN — HEPARIN SODIUM 7500 UNITS: 5000 INJECTION INTRAVENOUS; SUBCUTANEOUS at 05:04

## 2024-04-20 RX ADMIN — INSULIN DETEMIR 27 UNITS: 100 INJECTION, SOLUTION SUBCUTANEOUS at 09:04

## 2024-04-20 RX ADMIN — INSULIN ASPART 4 UNITS: 100 INJECTION, SOLUTION INTRAVENOUS; SUBCUTANEOUS at 12:04

## 2024-04-20 RX ADMIN — Medication 500 MG: at 09:04

## 2024-04-20 NOTE — SUBJECTIVE & OBJECTIVE
Interval History: NAEO. Having consistent soft bowel movements. Pending SNF.    Review of Systems   Unable to perform ROS: Patient nonverbal     Objective:     Vital Signs (Most Recent):  Temp: 98.2 °F (36.8 °C) (04/20/24 0737)  Pulse: 106 (04/20/24 0737)  Resp: 18 (04/20/24 0900)  BP: 111/75 (04/20/24 0737)  SpO2: 99 % (04/20/24 0737) Vital Signs (24h Range):  Temp:  [98.2 °F (36.8 °C)-100.1 °F (37.8 °C)] 98.2 °F (36.8 °C)  Pulse:  [102-113] 106  Resp:  [16-20] 18  SpO2:  [98 %-100 %] 99 %  BP: (104-127)/(69-81) 111/75     Weight: 122.5 kg (270 lb 1 oz)  Body mass index is 42.3 kg/m².    Intake/Output Summary (Last 24 hours) at 4/20/2024 1142  Last data filed at 4/20/2024 0700  Gross per 24 hour   Intake 630 ml   Output 700 ml   Net -70 ml         Physical Exam  Vitals and nursing note reviewed.   Constitutional:       General: She is not in acute distress.     Appearance: She is not ill-appearing, toxic-appearing or diaphoretic.      Comments: Trach in place, no purulence    HENT:      Head: Normocephalic and atraumatic.      Right Ear: External ear normal.      Left Ear: External ear normal.      Mouth/Throat:      Mouth: Mucous membranes are moist.      Pharynx: No oropharyngeal exudate.   Eyes:      General: No scleral icterus.     Extraocular Movements: Extraocular movements intact.      Pupils: Pupils are equal, round, and reactive to light.   Cardiovascular:      Rate and Rhythm: Normal rate and regular rhythm.      Pulses: Normal pulses.      Heart sounds: Normal heart sounds. No murmur heard.  Pulmonary:      Effort: Pulmonary effort is normal. No respiratory distress.      Breath sounds: Normal breath sounds. No wheezing or rales.   Abdominal:      General: Abdomen is flat. Bowel sounds are normal. There is no distension.      Palpations: Abdomen is soft. There is no mass.      Tenderness: There is no abdominal tenderness.      Comments: Peg tube in place, no purulence noted   Genitourinary:     Comments:  Has large bandage over groin area. Pictures in chart  Musculoskeletal:         General: No swelling or tenderness. Normal range of motion.      Cervical back: Normal range of motion and neck supple.      Right lower leg: No edema.      Left lower leg: No edema.      Comments: Moves BUE spontaneously   Skin:     General: Skin is warm and dry.      Capillary Refill: Capillary refill takes less than 2 seconds.      Findings: Wound present.   Neurological:      General: No focal deficit present.      Mental Status: She is alert and oriented to person, place, and time. Mental status is at baseline.      Comments: Nonverbal   Psychiatric:         Mood and Affect: Mood normal.         Behavior: Behavior normal.      Comments: Nonverbal             Significant Labs: All pertinent labs within the past 24 hours have been reviewed.    Significant Imaging: I have reviewed all pertinent imaging results/findings within the past 24 hours.

## 2024-04-20 NOTE — NURSING
Nurses Note -- 4 Eyes      4/20/2024   7:17 AM      Skin assessed during: Q Shift Change      [] No Altered Skin Integrity Present    []Prevention Measures Documented      [x] Yes- Altered Skin Integrity Present or Discovered   [] LDA Added if Not in Epic (Describe Wound)   [] New Altered Skin Integrity was Present on Admit and Documented in LDA   [] Wound Image Taken    Wound Care Consulted? No    Attending Nurse:  Mary Eastman RN/Staff Member:  [place]

## 2024-04-20 NOTE — PROGRESS NOTES
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    Morbid obesity     Necrotizing fasciitis        Social History     Socioeconomic History    Marital status: Single   Tobacco Use    Smoking status: Unknown     Social Determinants of Health     Financial Resource Strain: Patient Unable To Answer (3/14/2024)    Overall Financial Resource Strain (CARDIA)     Difficulty of Paying Living Expenses: Patient unable to answer   Recent Concern: Financial Resource Strain - High Risk (3/9/2024)    Overall Financial Resource Strain (CARDIA)     Difficulty of Paying Living Expenses: Very hard   Food Insecurity: Patient Unable To Answer (3/14/2024)    Hunger Vital Sign     Worried About Running Out of Food in the Last Year: Patient unable to answer     Ran Out of Food in the Last Year: Patient unable to answer   Transportation Needs: Patient Unable To Answer (3/14/2024)    PRAPARE - Transportation     Lack of Transportation (Medical): Patient unable to answer     Lack of Transportation (Non-Medical): Patient unable to answer   Physical Activity: Inactive (3/13/2024)    Exercise Vital Sign     Days of Exercise per Week: 0 days     Minutes of Exercise per Session: 0 min   Stress: Patient Unable To Answer (3/14/2024)    [employer] - Occupational Stress Questionnaire     Feeling of Stress : Patient unable to answer   Social Connections: Socially Isolated (3/13/2024)    Social Connection and Isolation Panel [NHANES]     Frequency of Communication with Friends and Family: More than three times a week     Frequency of Social Gatherings with Friends and Family: More than three times a week     Attends Temple Services: Never     Active Member of Clubs or Organizations: No     Attends  Club or Organization Meetings: Never     Marital Status: Never    Housing Stability: Patient Unable To Answer (3/14/2024)    Housing Stability Vital Sign     Unable to Pay for Housing in the Last Year: Patient unable to answer     Number of Places Lived in the Last Year: 1     Unstable Housing in the Last Year: Patient unable to answer   Recent Concern: Housing Stability - High Risk (3/9/2024)    Housing Stability Vital Sign     Unable to Pay for Housing in the Last Year: Yes     Unstable Housing in the Last Year: No       Precautions:     Allergies as of 04/10/2024    (No Known Allergies)       WO Assessment Details/Treatment     Patient seen for FU wound assmt/ dressing change to R groin.-last seen 4/15.    Reviewed chart for this encounter.   See Flow Sheet for findings.    Pt awake- nonverbal. Requires max assist to turn/ maintain position needed for care. Care continued with Aquacel Ag to R groin wound for absorption an bacteriocidal property to support healing- border foam cover dressing used for added protection and maintain moisture balance due to wound location within deep abdominal skin fold. Pt tolerated care without cues of discomfort or distress.     RECOMMENDATIONS:  R groin- 1)clean with Vashe solution and pat dry  2)apply silver hydrofiber (Aquacel Ag) to open area  3)cover with border foam dressing- sacrum 7x8 cm. Perform care MWF-  or change dressing if becomes saturated. If dressing is changed daily- please re consult IP wound care.      See EMR for orders     Bedside nursing to continue care & monitoring.  Bedside nursing to maintain pressure injury prevention interventions.Baron inTouch bed in place for LUIS M and microclimate mgmt. Pt with position supports in place.  Current documented Tomas score is 12 with a nutrition sub scale score of 3.     04/19/24 1450   WOCN Assessment   WOCN Total Time (mins) 30   Visit Date 04/19/24   Visit Time 1450   Consult Type Follow Up   WOCN Speciality  Wound   Intervention assessed;changed;chart review;orders   Skin Interventions   Device Skin Pressure Protection positioning supports utilized;pressure points protected   Positioning   Body Position position maintained   Head of Bed (HOB) Positioning HOB elevated   Positioning/Transfer Devices pillows  (foams benath heels.)   Pressure Injury Prevention    Heel protection technique Foam dressing   Heel preventative measures Replace   Check Medical Devices Done        Incision/Site 01/29/24 2300 Right Pubis   Date First Assessed/Time First Assessed: 01/29/24 2300   Side: Right  Location: Pubis  Additional Comments: right groin area left open with betadine soaked Kerlix packing/4x4's and ABD's   Wound Image    Dressing Appearance Moist drainage   Drainage Amount Moderate   Drainage Characteristics/Odor Serous   Appearance Pink;Red;Moist   Red (%), Wound Tissue Color 100 %   Periwound Area Intact;Moist   Care Cleansed with:;Antimicrobial agent  (Vashe solution)   Dressing Changed   Periwound Care Absorptive dressing applied;Skin barrier film applied   Dressing Change Due 04/22/24      04/15/24 1415   WOCN Assessment   WOCN Total Time (mins) 30   Visit Date 04/15/24   Visit Time 1415   Consult Type Follow Up   WOCN Speciality Wound   Intervention assessed;chart review;applied;orders   Skin Interventions   Pressure Reduction Devices positioning supports utilized;specialty bed utilized  ([facility])   Positioning   Body Position position maintained   Head of Bed (HOB) Positioning HOB elevated   Positioning/Transfer Devices pillows  (foam beneath heels.)   Pressure Injury Prevention    Check Moisture Management Pad Done   Heel protection technique Foam dressing   Heel preventative measures Replace   Check Medical Devices Done        Incision/Site 01/29/24 2300 Right Pubis   Date First Assessed/Time First Assessed: 01/29/24 2300   Side: Right  Location: Pubis  Additional Comments: right groin area left open with  betadine soaked Kerlix packing/4x4's and ABD's   Wound Image    Dressing Appearance Saturated   Drainage Amount Moderate   Drainage Characteristics/Odor Serous   Appearance Pink;Red;Wet   Red (%), Wound Tissue Color 100 %   Periwound Area Macerated   Care Cleansed with:;Antimicrobial agent  (Vashe solution)   Dressing Changed;Applied;Silver;Hydrofiber;Silicone;Foam   Periwound Care Absorptive dressing applied;Skin barrier film applied   Dressing Change Due 04/17/24     Photos taken fr reference:   R heel- clear   L heel- clear      Will continue to follow as needed and/ or as directed until discharge.    Niki Jacobs BSN, RN, CWOCN  04/20/2024

## 2024-04-20 NOTE — RESPIRATORY THERAPY
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  Clinically Significant Surgical Hx: tracheostomy    24 Hours Vitals Range:  Temp:  [98.2 °F (36.8 °C)-99.8 °F (37.7 °C)]   Pulse:  [103-111]   Resp:  [16-18]   BP: (104-141)/(69-88)   SpO2:  [97 %-100 %]     Labs:    Recent Labs     24  0411   *   K 4.3   CL 97   CO2 20*   BUN 56*   CREATININE 5.4*   *   PHOS 4.6*   MG 2.4        No results for input(s): "PH", "PCO2", "PO2", "HCO3", "POCSATURATED", "BE" in the last 72 hours.    ASSESSMENT/INTERVENTIONS  Supplies at bedside      Last VS   Temp: 98.7 °F (37.1 °C) ( 162)  Pulse: 107 ( 162)  Resp: 18 ( 0900)  BP: 141/88 (1621)  SpO2: 99 % (1621)      Extra trachs at bedside: 4/6  Level of Consciousness: Level of Consciousness (AVPU): alert  Respiratory Effort: Respiratory Effort: Normal, Unlabored Expansion/Accessory Muscle Usage: Expansion/Accessory Muscles/Retractions: expansion symmetric, no retractions, no use of accessory muscles  All Lung Field Breath Sounds: All Lung Fields Breath Sounds: Anterior:, Posterior:, crackles, fine, diminished  JOSE Breath Sounds: diminished  LLL Breath Sounds: diminished  RUL Breath Sounds: diminished  RML Breath Sounds: diminished  RLL Breath Sounds: diminished  O2 Device/Concentration: ra  Surgical airway: Yes, Type: Shiley Size: 6, uncuffed  Ambu at bedside:       Active Orders   Respiratory Care    Oxygen Continuous     Frequency: Continuous     Number of Occurrences: Until Specified     Order Questions:      Device type: Low flow      Device: Trach Collar      FiO2%: 28%      Titrate O2 per " Oxygen Titration Protocol: Yes      To maintain SpO2 goal of: >= 90%      Notify MD of: Inability to achieve desired SpO2; Sudden change in patient status and requires 20% increase in FiO2; Patient requires >60% FiO2    Pulse Oximetry Continuous     Frequency: Continuous     Number of Occurrences: Until Specified    Routine tracheostomy care     Frequency: BID     Number of Occurrences: Until Specified       RECOMMENDATIONS    We recommend: RRT Recs: Continue POC per primary team.      FOLLOW-UP    Please call back the Rapid Response RT, Kathy Hall, RRT at x [phone] for any questions or concerns.

## 2024-04-20 NOTE — PROGRESS NOTES
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 History was given from patient's daughter. She was undergoing dialysis today and noticed she was lethargic, less alert than usual self. Pt completed dialysis and still not acting herself. EMS was called, fever of a 100.  On chart review, she did have an episode of large volume emesis around 1700.  Per EMS, she had a slightly elevated temp 100.0°F, glucose 300s.     In the ED: UA 2+ leuks, >100 WBC, many bacteria, WBC 17, CT abd/pelvis concerning for cystitis. Given vanc/zosyn    Overview/Hospital Course:  Patient was admitted to Hospital Medicine service for medical management and evaluation of urosepsis. Patient was continued on vanc/zosyn. Vascular Neurology consulted concerning mental status change with the recommendation to initiate ASA 81 QD and to obtain an MRI to r/o new stroke. Imaging pending. Nephrology was consulted for regularly scheduled dialysis. Afternoon of 4/12, patient was unable to tolerate filtration of volume during dialsis 2/2 hypotension. Patient received 500cc bolus and was transferred back to floor after finishing the session without volume removed. Will monitor for signs of volume overload. Tailoring insulin regimen while uptitrating tube feeds to goal rate. Staph Epi in all 4 bottles; ID with recommendation to continue Vanc & de-escalate meropenem to ertapenem on 04/15 through 4/16. Patient completed IV course of UTI coverage. Patient tolerated volume removal well in dialysis  throughout the rest of her hospital stay. Pending discharge to [facility]ACH pending bed availability. Patient without BM with one episode of vomiting overnight 4/17. KUB with bowel gas and low concern for obstruction with no transition point noted. Escalating bowel regimen.    Interval History: NAEO. Having consistent soft bowel movements. Pending SNF.    Review of Systems   Unable to perform ROS: Patient nonverbal     Objective:     Vital Signs (Most Recent):  Temp: 98.2 °F (36.8 °C) (04/20/24 0737)  Pulse: 106 (04/20/24 0737)  Resp: 18 (04/20/24 0900)  BP: 111/75 (04/20/24 0737)  SpO2: 99 % (04/20/24 0737) Vital Signs (24h Range):  Temp:  [98.2 °F (36.8 °C)-100.1 °F (37.8 °C)] 98.2 °F (36.8 °C)  Pulse:  [102-113] 106  Resp:  [16-20] 18  SpO2:  [98 %-100 %] 99 %  BP: (104-127)/(69-81) 111/75     Weight: 122.5 kg (270 lb 1 oz)  Body mass index is 42.3 kg/m².    Intake/Output Summary (Last 24 hours) at 4/20/2024 1142  Last data filed at 4/20/2024 0700  Gross per 24 hour   Intake 630 ml   Output 700 ml   Net -70 ml         Physical Exam  Vitals and nursing note reviewed.   Constitutional:       General: She is not in acute distress.     Appearance: She is not ill-appearing, toxic-appearing or diaphoretic.      Comments: Trach in place, no purulence    HENT:      Head: Normocephalic and atraumatic.      Right Ear: External ear normal.      Left Ear: External ear normal.      Mouth/Throat:      Mouth: Mucous membranes are moist.      Pharynx: No oropharyngeal exudate.   Eyes:      General: No scleral icterus.     Extraocular Movements: Extraocular movements intact.      Pupils: Pupils are equal, round, and reactive to light.   Cardiovascular:      Rate and Rhythm: Normal rate and regular rhythm.      Pulses: Normal pulses.      Heart sounds: Normal heart sounds. No murmur heard.  Pulmonary:      Effort: Pulmonary effort is normal. No respiratory distress.      Breath sounds: Normal breath sounds. No wheezing or rales.    Abdominal:      General: Abdomen is flat. Bowel sounds are normal. There is no distension.      Palpations: Abdomen is soft. There is no mass.      Tenderness: There is no abdominal tenderness.      Comments: Peg tube in place, no purulence noted   Genitourinary:     Comments: Has large bandage over groin area. Pictures in chart  Musculoskeletal:         General: No swelling or tenderness. Normal range of motion.      Cervical back: Normal range of motion and neck supple.      Right lower leg: No edema.      Left lower leg: No edema.      Comments: Moves BUE spontaneously   Skin:     General: Skin is warm and dry.      Capillary Refill: Capillary refill takes less than 2 seconds.      Findings: Wound present.   Neurological:      General: No focal deficit present.      Mental Status: She is alert and oriented to person, place, and time. Mental status is at baseline.      Comments: Nonverbal   Psychiatric:         Mood and Affect: Mood normal.         Behavior: Behavior normal.      Comments: Nonverbal             Significant Labs: All pertinent labs within the past 24 hours have been reviewed.    Significant Imaging: I have reviewed all pertinent imaging results/findings within the past 24 hours.    Assessment/Plan:      * Acute cystitis with hematuria  Pt presenting with AMS and worsening lethargy. Pt is non-verbal at baseline, does not follow commands, but was less responsive than normal. Pt found to have a fever. Urinary source suspected based off of urine and CT abd/pelvis. Pt has many prior resistant bacterial infections including urinary sources. Has prior with sensitivity to zosyn and has also improved off ED dose of vanc/zosyn. Lactic elevated a 3.8->3.49 prior to completion of fluid bolus 500ml    Plan  S/p vanc/merrem, now transitioned to vanc/ertapenem (on 04/15) per ID. Course completed 4/16.  ID consulted  F/u blood cultures (4/4 w/ staph epi)  UC w/ proteus; sensitive to erta  Daily cbc  Contact  precautions    Constipation  Patient without BM x5d. One episode of vomitus night of 4/17. Some concern for bowel obstruction. Tolerating continuous tube feeds at goal rate with 0cc on residuals. Physical exam with bowel sounds, soft nontender abdomen. KUB without concern for obstruction with bowel gas, lack of transition point.    4/18 Multiple BM overnight. Will keep current bowel regimen in place, scaling back if stool further output increases.    Plan  - escalating to Miralax BID, Senna BID  - One time mag citrate per PEG ordered  - compazine PRN  - please contact MD team on call with concern for worsening nausea or abdominal pain.    Moderate malnutrition  Nutrition consulted. Most recent weight and BMI monitored-     Measurements:  Wt Readings from Last 1 Encounters:   04/18/24 122.5 kg (270 lb 1 oz)   Body mass index is 42.3 kg/m².    Patient has been screened and assessed by RD.    Malnutrition Type:  Context: acute illness or injury  Level: moderate    Malnutrition Characteristic Summary:  Weight Loss (Malnutrition): 10% in 6 months  Subcutaneous Fat (Malnutrition): mild depletion  Muscle Mass (Malnutrition): mild depletion  Fluid Accumulation (Malnutrition): mild    Interventions/Recommendations (treatment strategy):  1.      Atelectasis  I have personally reviewed Chest X-ray. Patient with atelectasis on interpretation. Likely Non-Obstructive atelectasis secondary to immobility. Signs and symptoms include Hypoxemia Will begin treatment with upright positioning.    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 Physical therapy services has not been scheduled. Continue all standard measures for pressure injury prevention  and consult wound care for any wounds (chronic or acute).    ESRD (end stage renal disease) on dialysis  Creatine stable for now. BMP reviewed- noted Estimated Creatinine Clearance: 16.4 mL/min (A) (based on SCr of 5.4 mg/dL (H)). according to latest data. Based on current GFR, CKD stage is end stage.  Monitor UOP and serial BMP and adjust therapy as needed. Renally dose meds. Avoid nephrotoxic medications and procedures.    Pt MWF HD, underwent on 4/10, complete session  Plan  Nephrology consult  Monitor fluid status    Acute encephalopathy  Pt is non-verbal and does not follow commands at baseline. Vascular Neurology consulted given acute change from baseline. MRI with concern for evolution of prior strokes with noted differential of T2 hyperintensities including vasculitis vs demyelination. Discussed with Vascular Neurology; low concern for ongoing vasculitis/demyelination, likely represents typical evolution of prior infarcts.    Plan  - STAT head imaging for concern of new change in mental status/focal deficit    Type 2 diabetes mellitus with hyperglycemia, with long-term current use of insulin  Pt currently taking detemir 25 BID, and moderate sliding scale aspart. Glucose elevated in the 300s on arrival. 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 Pt underwent extensive course, currently still healing from this, but no longer has wound vac. Pt's wound currently covered with bandage. Picture in media tabs    Plan  Wound care        VTE Risk Mitigation (From admission, onward)           Ordered     heparin (porcine) injection 3,000 Units  As needed (PRN)          04/17/24 0825     heparin (porcine) injection 1,000 Units  As needed (PRN)         04/12/24 0951     heparin (porcine) injection 7,500 Units  Every 8 hours         04/11/24 0252                    Discharge Planning   ZEKE: 4/23/2024     Code Status: Full Code   Is the patient medically ready for discharge?: No    Reason for patient still in hospital (select all that apply): Treatment  Discharge Plan A: Long-term acute care facility (LTAC)                  Juan A Myers MD  Department of Hospital Medicine   [facility] - Telemetry Stepdown

## 2024-04-20 NOTE — PLAN OF CARE
Pt is alert but nonverbal, doesn't follow commands. Multiple loose BM's yesterday. Trache CDI on humidified RA. VSS, no acute distress noted. POC on going.    Problem: Skin Injury Risk Increased  Goal: Skin Health and Integrity  Outcome: Ongoing, Progressing     Problem: Adult Inpatient Plan of Care  Goal: Plan of Care Review  Outcome: Ongoing, Progressing  Goal: Absence of Hospital-Acquired Illness or Injury  Outcome: Ongoing, Progressing  Goal: Optimal Comfort and Wellbeing  Outcome: Ongoing, Progressing  Goal: Readiness for Transition of Care  Outcome: Ongoing, Progressing     Problem: Bariatric Environmental Safety  Goal: Safety Maintained with Care  Outcome: Ongoing, Progressing     Problem: Diabetes Comorbidity  Goal: Blood Glucose Level Within Targeted Range  Outcome: Ongoing, Progressing     Problem: Fall Injury Risk  Goal: Absence of Fall and Fall-Related Injury  Outcome: Ongoing, Progressing     Problem: Adjustment to Illness (Chronic Kidney Disease)  Goal: Optimal Coping with Chronic Illness  Outcome: Ongoing, Progressing     Problem: Electrolyte Imbalance (Chronic Kidney Disease)  Goal: Electrolyte Balance  Outcome: Ongoing, Progressing     Problem: Renal Function Impairment (Chronic Kidney Disease)  Goal: Minimize Renal Failure Effects  Outcome: Ongoing, Progressing

## 2024-04-21 LAB
ALBUMIN SERPL BCP-MCNC: 2.9 G/DL (ref 3.5–5.2)
ANION GAP SERPL CALC-SCNC: 15 MMOL/L (ref 8–16)
BUN SERPL-MCNC: 47 MG/DL (ref 6–20)
CALCIUM SERPL-MCNC: 10.4 MG/DL (ref 8.7–10.5)
CHLORIDE SERPL-SCNC: 92 MMOL/L (ref 95–110)
CO2 SERPL-SCNC: 23 MMOL/L (ref 23–29)
CREAT SERPL-MCNC: 5.2 MG/DL (ref 0.5–1.4)
EST. GFR  (NO RACE VARIABLE): 9.3 ML/MIN/1.73 M^2
GLUCOSE SERPL-MCNC: 127 MG/DL (ref 70–110)
MAGNESIUM SERPL-MCNC: 2.5 MG/DL (ref 1.6–2.6)
PHOSPHATE SERPL-MCNC: 4.4 MG/DL (ref 2.7–4.5)
POCT GLUCOSE: 106 MG/DL (ref 70–110)
POCT GLUCOSE: 124 MG/DL (ref 70–110)
POCT GLUCOSE: 138 MG/DL (ref 70–110)
POCT GLUCOSE: 147 MG/DL (ref 70–110)
POCT GLUCOSE: 147 MG/DL (ref 70–110)
POTASSIUM SERPL-SCNC: 3.6 MMOL/L (ref 3.5–5.1)
SODIUM SERPL-SCNC: 130 MMOL/L (ref 136–145)

## 2024-04-21 PROCEDURE — 27000221 HC OXYGEN, UP TO 24 HOURS

## 2024-04-21 PROCEDURE — 25000003 PHARM REV CODE 250

## 2024-04-21 PROCEDURE — 25000003 PHARM REV CODE 250: Performed by: STUDENT IN AN ORGANIZED HEALTH CARE EDUCATION/TRAINING PROGRAM

## 2024-04-21 PROCEDURE — 25000003 PHARM REV CODE 250: Performed by: INTERNAL MEDICINE

## 2024-04-21 PROCEDURE — 63600175 PHARM REV CODE 636 W HCPCS

## 2024-04-21 PROCEDURE — 36415 COLL VENOUS BLD VENIPUNCTURE: CPT

## 2024-04-21 PROCEDURE — 27000207 HC ISOLATION

## 2024-04-21 PROCEDURE — 99900035 HC TECH TIME PER 15 MIN (STAT)

## 2024-04-21 PROCEDURE — 80069 RENAL FUNCTION PANEL: CPT

## 2024-04-21 PROCEDURE — 83735 ASSAY OF MAGNESIUM: CPT

## 2024-04-21 PROCEDURE — 99900026 HC AIRWAY MAINTENANCE (STAT)

## 2024-04-21 PROCEDURE — 20600001 HC STEP DOWN PRIVATE ROOM

## 2024-04-21 PROCEDURE — 25000242 PHARM REV CODE 250 ALT 637 W/ HCPCS

## 2024-04-21 PROCEDURE — 94761 N-INVAS EAR/PLS OXIMETRY MLT: CPT

## 2024-04-21 RX ADMIN — INSULIN ASPART 4 UNITS: 100 INJECTION, SOLUTION INTRAVENOUS; SUBCUTANEOUS at 06:04

## 2024-04-21 RX ADMIN — POTASSIUM BICARBONATE 40 MEQ: 391 TABLET, EFFERVESCENT ORAL at 09:04

## 2024-04-21 RX ADMIN — ZINC SULFATE 220 MG (50 MG) CAPSULE 220 MG: CAPSULE at 09:04

## 2024-04-21 RX ADMIN — HEPARIN SODIUM 7500 UNITS: 5000 INJECTION INTRAVENOUS; SUBCUTANEOUS at 02:04

## 2024-04-21 RX ADMIN — INSULIN DETEMIR 27 UNITS: 100 INJECTION, SOLUTION SUBCUTANEOUS at 09:04

## 2024-04-21 RX ADMIN — ASPIRIN 81 MG CHEWABLE TABLET 81 MG: 81 TABLET CHEWABLE at 09:04

## 2024-04-21 RX ADMIN — SEVELAMER CARBONATE 0.8 G: 2400 POWDER, FOR SUSPENSION ORAL at 09:04

## 2024-04-21 RX ADMIN — INSULIN ASPART 4 UNITS: 100 INJECTION, SOLUTION INTRAVENOUS; SUBCUTANEOUS at 12:04

## 2024-04-21 RX ADMIN — HEPARIN SODIUM 7500 UNITS: 5000 INJECTION INTRAVENOUS; SUBCUTANEOUS at 09:04

## 2024-04-21 RX ADMIN — PSYLLIUM HUSK 1 PACKET: 3.4 POWDER ORAL at 09:04

## 2024-04-21 RX ADMIN — Medication 500 MG: at 09:04

## 2024-04-21 RX ADMIN — DOCUSATE SODIUM AND SENNOSIDES 1 TABLET: 8.6; 5 TABLET, FILM COATED ORAL at 09:04

## 2024-04-21 RX ADMIN — POTASSIUM BICARBONATE 40 MEQ: 391 TABLET, EFFERVESCENT ORAL at 07:04

## 2024-04-21 RX ADMIN — PANTOPRAZOLE SODIUM 40 MG: 40 GRANULE, DELAYED RELEASE ORAL at 09:04

## 2024-04-21 RX ADMIN — SEVELAMER CARBONATE 0.8 G: 2400 POWDER, FOR SUSPENSION ORAL at 02:04

## 2024-04-21 RX ADMIN — HEPARIN SODIUM 7500 UNITS: 5000 INJECTION INTRAVENOUS; SUBCUTANEOUS at 06:04

## 2024-04-21 RX ADMIN — POLYETHYLENE GLYCOL 3350 17 G: 17 POWDER, FOR SOLUTION ORAL at 09:04

## 2024-04-21 NOTE — ASSESSMENT & PLAN NOTE
Creatine stable for now. BMP reviewed- noted Estimated Creatinine Clearance: 17 mL/min (A) (based on SCr of 5.2 mg/dL (H)). according to latest data. Based on current GFR, CKD stage is end stage.  Monitor UOP and serial BMP and adjust therapy as needed. Renally dose meds. Avoid nephrotoxic medications and procedures.    Pt MWF HD, underwent on 4/10, complete session  Plan  Nephrology consult  Monitor fluid status

## 2024-04-21 NOTE — PLAN OF CARE
Problem: Adult Inpatient Plan of Care  Goal: Absence of Hospital-Acquired Illness or Injury  Outcome: Ongoing, Progressing  Goal: Optimal Comfort and Wellbeing  Outcome: Ongoing, Progressing  Goal: Readiness for Transition of Care  Outcome: Ongoing, Progressing     Problem: Bariatric Environmental Safety  Goal: Safety Maintained with Care  Outcome: Ongoing, Progressing

## 2024-04-21 NOTE — SUBJECTIVE & OBJECTIVE
Interval History: NAEO. Medically stable. Pending placement.    Review of Systems   Unable to perform ROS: Patient nonverbal     Objective:     Vital Signs (Most Recent):  Temp: 98.5 °F (36.9 °C) (04/21/24 1244)  Pulse: 96 (04/21/24 1348)  Resp: 18 (04/21/24 0803)  BP: (!) 145/83 (04/21/24 1244)  SpO2: 100 % (04/21/24 1348) Vital Signs (24h Range):  Temp:  [98.5 °F (36.9 °C)-99.9 °F (37.7 °C)] 98.5 °F (36.9 °C)  Pulse:  [] 96  Resp:  [17-18] 18  SpO2:  [95 %-100 %] 100 %  BP: (131-145)/(76-88) 145/83     Weight: 122.5 kg (270 lb 1 oz)  Body mass index is 42.3 kg/m².    Intake/Output Summary (Last 24 hours) at 4/21/2024 1400  Last data filed at 4/21/2024 0803  Gross per 24 hour   Intake 190 ml   Output --   Net 190 ml         Physical Exam  Vitals and nursing note reviewed.   Constitutional:       General: She is not in acute distress.     Appearance: She is not ill-appearing, toxic-appearing or diaphoretic.      Comments: Trach in place   HENT:      Head: Normocephalic and atraumatic.      Right Ear: External ear normal.      Left Ear: External ear normal.      Mouth/Throat:      Mouth: Mucous membranes are moist.      Pharynx: No oropharyngeal exudate.   Eyes:      General: No scleral icterus.     Extraocular Movements: Extraocular movements intact.      Pupils: Pupils are equal, round, and reactive to light.   Cardiovascular:      Rate and Rhythm: Normal rate and regular rhythm.      Pulses: Normal pulses.      Heart sounds: Normal heart sounds. No murmur heard.  Pulmonary:      Effort: Pulmonary effort is normal. No respiratory distress.      Breath sounds: Normal breath sounds. No wheezing or rales.   Abdominal:      General: Abdomen is flat. Bowel sounds are normal. There is no distension.      Palpations: Abdomen is soft. There is no mass.      Tenderness: There is no abdominal tenderness.      Comments: Peg tube in place   Genitourinary:     Comments: Has large bandage over groin area. Pictures in  chart  Musculoskeletal:         General: No swelling or tenderness. Normal range of motion.      Cervical back: Normal range of motion and neck supple.      Right lower leg: No edema.      Left lower leg: No edema.      Comments: Moves BUE spontaneously   Skin:     General: Skin is warm and dry.      Capillary Refill: Capillary refill takes less than 2 seconds.      Findings: Wound present.   Neurological:      General: No focal deficit present.      Mental Status: She is alert and oriented to person, place, and time. Mental status is at baseline.      Comments: Nonverbal   Psychiatric:         Mood and Affect: Mood normal.         Behavior: Behavior normal.      Comments: Nonverbal  Not following commands             Significant Labs: All pertinent labs within the past 24 hours have been reviewed.    Significant Imaging: I have reviewed all pertinent imaging results/findings within the past 24 hours.

## 2024-04-21 NOTE — PLAN OF CARE
04/21/24 1239   Discharge Reassessment   Assessment Type Discharge Planning Reassessment   Did the patient's condition or plan change since previous assessment? No   Discharge Plan discussed with: Patient   Discharge Plan A Long-term acute care facility (LTAC)   Discharge Plan B Long-term acute care facility (LTAC)   DME Needed Upon Discharge  none   Transition of Care Barriers Underinsured   Why the patient remains in the hospital Placement issues

## 2024-04-21 NOTE — PROGRESS NOTES
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 History was given from patient's daughter. She was undergoing dialysis today and noticed she was lethargic, less alert than usual self. Pt completed dialysis and still not acting herself. EMS was called, fever of a 100.  On chart review, she did have an episode of large volume emesis around 1700.  Per EMS, she had a slightly elevated temp 100.0°F, glucose 300s.     In the ED: UA 2+ leuks, >100 WBC, many bacteria, WBC 17, CT abd/pelvis concerning for cystitis. Given vanc/zosyn    Overview/Hospital Course:  Patient was admitted to Hospital Medicine service for medical management and evaluation of urosepsis. Patient was continued on vanc/zosyn. Vascular Neurology consulted concerning mental status change with the recommendation to initiate ASA 81 QD and to obtain an MRI to r/o new stroke. Imaging pending. Nephrology was consulted for regularly scheduled dialysis. Afternoon of 4/12, patient was unable to tolerate filtration of volume during dialsis 2/2 hypotension. Patient received 500cc bolus and was transferred back to floor after finishing the session without volume removed. Will monitor for signs of volume overload. Tailoring insulin regimen while uptitrating tube feeds to goal rate. Staph Epi in all 4 bottles; ID with recommendation to continue Vanc & de-escalate meropenem to ertapenem on 04/15 through 4/16. Patient completed IV course of UTI coverage. Patient tolerated volume removal well in dialysis  throughout the rest of her hospital stay. Pending discharge to LTACH pending bed availability. Patient without BM with one episode of vomiting overnight 4/17. KUB with bowel gas and low concern for obstruction with no transition point noted. Escalating bowel regimen.    Interval History: NAEO. Medically stable. Pending placement.    Review of Systems   Unable to perform ROS: Patient nonverbal     Objective:     Vital Signs (Most Recent):  Temp: 98.5 °F (36.9 °C) (04/21/24 1244)  Pulse: 96 (04/21/24 1348)  Resp: 18 (04/21/24 0803)  BP: (!) 145/83 (04/21/24 1244)  SpO2: 100 % (04/21/24 1348) Vital Signs (24h Range):  Temp:  [98.5 °F (36.9 °C)-99.9 °F (37.7 °C)] 98.5 °F (36.9 °C)  Pulse:  [] 96  Resp:  [17-18] 18  SpO2:  [95 %-100 %] 100 %  BP: (131-145)/(76-88) 145/83     Weight: 122.5 kg (270 lb 1 oz)  Body mass index is 42.3 kg/m².    Intake/Output Summary (Last 24 hours) at 4/21/2024 1400  Last data filed at 4/21/2024 0803  Gross per 24 hour   Intake 190 ml   Output --   Net 190 ml         Physical Exam  Vitals and nursing note reviewed.   Constitutional:       General: She is not in acute distress.     Appearance: She is not ill-appearing, toxic-appearing or diaphoretic.      Comments: Trach in place   HENT:      Head: Normocephalic and atraumatic.      Right Ear: External ear normal.      Left Ear: External ear normal.      Mouth/Throat:      Mouth: Mucous membranes are moist.      Pharynx: No oropharyngeal exudate.   Eyes:      General: No scleral icterus.     Extraocular Movements: Extraocular movements intact.      Pupils: Pupils are equal, round, and reactive to light.   Cardiovascular:      Rate and Rhythm: Normal rate and regular rhythm.      Pulses: Normal pulses.      Heart sounds: Normal heart sounds. No murmur heard.  Pulmonary:      Effort: Pulmonary effort is normal. No respiratory distress.      Breath sounds: Normal breath sounds. No wheezing or rales.   Abdominal:      General: Abdomen is  flat. Bowel sounds are normal. There is no distension.      Palpations: Abdomen is soft. There is no mass.      Tenderness: There is no abdominal tenderness.      Comments: Peg tube in place   Genitourinary:     Comments: Has large bandage over groin area. Pictures in chart  Musculoskeletal:         General: No swelling or tenderness. Normal range of motion.      Cervical back: Normal range of motion and neck supple.      Right lower leg: No edema.      Left lower leg: No edema.      Comments: Moves BUE spontaneously   Skin:     General: Skin is warm and dry.      Capillary Refill: Capillary refill takes less than 2 seconds.      Findings: Wound present.   Neurological:      General: No focal deficit present.      Mental Status: She is alert and oriented to person, place, and time. Mental status is at baseline.      Comments: Nonverbal   Psychiatric:         Mood and Affect: Mood normal.         Behavior: Behavior normal.      Comments: Nonverbal  Not following commands             Significant Labs: All pertinent labs within the past 24 hours have been reviewed.    Significant Imaging: I have reviewed all pertinent imaging results/findings within the past 24 hours.    Assessment/Plan:      * Acute cystitis with hematuria  Pt presenting with AMS and worsening lethargy. Pt is non-verbal at baseline, does not follow commands, but was less responsive than normal. Pt found to have a fever. Urinary source suspected based off of urine and CT abd/pelvis. Pt has many prior resistant bacterial infections including urinary sources. Has prior with sensitivity to zosyn and has also improved off ED dose of vanc/zosyn. Lactic elevated a 3.8->3.49 prior to completion of fluid bolus 500ml    Plan  S/p vanc/merrem, now transitioned to vanc/ertapenem (on 04/15) per ID. Course completed 4/16.  ID consulted  F/u blood cultures (4/4 w/ staph epi)  UC w/ proteus; sensitive to erta  Daily cbc  Contact precautions    Constipation  Patient  without BM x5d. One episode of vomitus night of 4/17. Some concern for bowel obstruction. Tolerating continuous tube feeds at goal rate with 0cc on residuals. Physical exam with bowel sounds, soft nontender abdomen. KUB without concern for obstruction with bowel gas, lack of transition point.    4/18 Multiple BM overnight. Will keep current bowel regimen in place, scaling back if stool further output increases.    Plan  - escalating to Miralax BID, Senna BID  - One time mag citrate per PEG ordered  - compazine PRN  - please contact MD team on call with concern for worsening nausea or abdominal pain.    Moderate malnutrition  Nutrition consulted. Most recent weight and BMI monitored-     Measurements:  Wt Readings from Last 1 Encounters:   04/18/24 122.5 kg (270 lb 1 oz)   Body mass index is 42.3 kg/m².    Patient has been screened and assessed by RD.    Malnutrition Type:  Context: acute illness or injury  Level: moderate    Malnutrition Characteristic Summary:  Weight Loss (Malnutrition): 10% in 6 months  Subcutaneous Fat (Malnutrition): mild depletion  Muscle Mass (Malnutrition): mild depletion  Fluid Accumulation (Malnutrition): mild    Interventions/Recommendations (treatment strategy):  1.      Atelectasis  I have personally reviewed Chest X-ray. Patient with atelectasis on interpretation. Likely Non-Obstructive atelectasis secondary to immobility. Signs and symptoms include Hypoxemia Will begin treatment with upright positioning.    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 Physical therapy services has not been scheduled. Continue all standard measures for pressure injury prevention and consult wound care for any wounds  (chronic or acute).    ESRD (end stage renal disease) on dialysis  Creatine stable for now. BMP reviewed- noted Estimated Creatinine Clearance: 17 mL/min (A) (based on SCr of 5.2 mg/dL (H)). according to latest data. Based on current GFR, CKD stage is end stage.  Monitor UOP and serial BMP and adjust therapy as needed. Renally dose meds. Avoid nephrotoxic medications and procedures.    Pt MWF HD, underwent on 4/10, complete session  Plan  Nephrology consult  Monitor fluid status    Acute encephalopathy  Pt is non-verbal and does not follow commands at baseline. Vascular Neurology consulted given acute change from baseline. MRI with concern for evolution of prior strokes with noted differential of T2 hyperintensities including vasculitis vs demyelination. Discussed with Vascular Neurology; low concern for ongoing vasculitis/demyelination, likely represents typical evolution of prior infarcts.    Plan  - STAT head imaging for concern of new change in mental status/focal deficit    Type 2 diabetes mellitus with hyperglycemia, with long-term current use of insulin  Pt currently taking detemir 25 BID, and moderate sliding scale aspart. Glucose elevated in the 300s on arrival. 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 Pt underwent extensive course, currently still healing from this, but no longer has wound vac. Pt's wound currently covered with bandage. Picture in media tabs    Plan  Wound care        VTE Risk Mitigation (From admission, onward)           Ordered     heparin (porcine) injection 3,000 Units  As needed (PRN)         04/17/24 0825     heparin (porcine)  injection 1,000 Units  As needed (PRN)         04/12/24 0951     heparin (porcine) injection 7,500 Units  Every 8 hours         04/11/24 0252                    Discharge Planning   ZEKE: 4/22/2024     Code Status: Full Code   Is the patient medically ready for discharge?: No    Reason for patient still in hospital (select all that apply): Treatment  Discharge Plan A: Long-term acute care facility (LTAC)                  Juan A Myers MD  Department of Hospital Medicine   [facility] - Telemetry Stepdown

## 2024-04-21 NOTE — NURSING
Nurses Note -- 4 Eyes      4/21/2024   7:20 AM      Skin assessed during: Q Shift Change      [] No Altered Skin Integrity Present    []Prevention Measures Documented      [x] Yes- Altered Skin Integrity Present or Discovered   [] LDA Added if Not in Epic (Describe Wound)   [] New Altered Skin Integrity was Present on Admit and Documented in LDA   [] Wound Image Taken    Wound Care Consulted? No    Attending Nurse:  Mary Eastman RN/Staff Member:  Kendell

## 2024-04-22 LAB
ALBUMIN SERPL BCP-MCNC: 2.9 G/DL (ref 3.5–5.2)
ANION GAP SERPL CALC-SCNC: 13 MMOL/L (ref 8–16)
BASOPHILS # BLD AUTO: 0.04 K/UL (ref 0–0.2)
BASOPHILS NFR BLD: 0.4 % (ref 0–1.9)
BUN SERPL-MCNC: 66 MG/DL (ref 6–20)
CALCIUM SERPL-MCNC: 10.6 MG/DL (ref 8.7–10.5)
CHLORIDE SERPL-SCNC: 95 MMOL/L (ref 95–110)
CO2 SERPL-SCNC: 24 MMOL/L (ref 23–29)
CREAT SERPL-MCNC: 6 MG/DL (ref 0.5–1.4)
DIFFERENTIAL METHOD BLD: ABNORMAL
EOSINOPHIL # BLD AUTO: 0.6 K/UL (ref 0–0.5)
EOSINOPHIL NFR BLD: 7.1 % (ref 0–8)
ERYTHROCYTE [DISTWIDTH] IN BLOOD BY AUTOMATED COUNT: 15.6 % (ref 11.5–14.5)
EST. GFR  (NO RACE VARIABLE): 7.8 ML/MIN/1.73 M^2
GLUCOSE SERPL-MCNC: 146 MG/DL (ref 70–110)
HCT VFR BLD AUTO: 33.4 % (ref 37–48.5)
HGB BLD-MCNC: 10.2 G/DL (ref 12–16)
IMM GRANULOCYTES # BLD AUTO: 0.03 K/UL (ref 0–0.04)
IMM GRANULOCYTES NFR BLD AUTO: 0.3 % (ref 0–0.5)
LYMPHOCYTES # BLD AUTO: 1.9 K/UL (ref 1–4.8)
LYMPHOCYTES NFR BLD: 21.2 % (ref 18–48)
MCH RBC QN AUTO: 27.8 PG (ref 27–31)
MCHC RBC AUTO-ENTMCNC: 30.5 G/DL (ref 32–36)
MCV RBC AUTO: 91 FL (ref 82–98)
MONOCYTES # BLD AUTO: 1.2 K/UL (ref 0.3–1)
MONOCYTES NFR BLD: 12.7 % (ref 4–15)
NEUTROPHILS # BLD AUTO: 5.3 K/UL (ref 1.8–7.7)
NEUTROPHILS NFR BLD: 58.3 % (ref 38–73)
NRBC BLD-RTO: 0 /100 WBC
PHOSPHATE SERPL-MCNC: 5.2 MG/DL (ref 2.7–4.5)
PLATELET # BLD AUTO: 279 K/UL (ref 150–450)
PMV BLD AUTO: 9.7 FL (ref 9.2–12.9)
POCT GLUCOSE: 130 MG/DL (ref 70–110)
POCT GLUCOSE: 134 MG/DL (ref 70–110)
POCT GLUCOSE: 149 MG/DL (ref 70–110)
POCT GLUCOSE: 156 MG/DL (ref 70–110)
POCT GLUCOSE: 162 MG/DL (ref 70–110)
POCT GLUCOSE: 166 MG/DL (ref 70–110)
POTASSIUM SERPL-SCNC: 4.1 MMOL/L (ref 3.5–5.1)
RBC # BLD AUTO: 3.67 M/UL (ref 4–5.4)
SODIUM SERPL-SCNC: 132 MMOL/L (ref 136–145)
WBC # BLD AUTO: 9.07 K/UL (ref 3.9–12.7)

## 2024-04-22 PROCEDURE — 97165 OT EVAL LOW COMPLEX 30 MIN: CPT

## 2024-04-22 PROCEDURE — 20600001 HC STEP DOWN PRIVATE ROOM

## 2024-04-22 PROCEDURE — 97535 SELF CARE MNGMENT TRAINING: CPT

## 2024-04-22 PROCEDURE — 25000003 PHARM REV CODE 250: Performed by: NURSE PRACTITIONER

## 2024-04-22 PROCEDURE — 80100014 HC HEMODIALYSIS 1:1

## 2024-04-22 PROCEDURE — 99900026 HC AIRWAY MAINTENANCE (STAT)

## 2024-04-22 PROCEDURE — 36415 COLL VENOUS BLD VENIPUNCTURE: CPT

## 2024-04-22 PROCEDURE — 25000003 PHARM REV CODE 250

## 2024-04-22 PROCEDURE — 99900035 HC TECH TIME PER 15 MIN (STAT)

## 2024-04-22 PROCEDURE — 27000221 HC OXYGEN, UP TO 24 HOURS

## 2024-04-22 PROCEDURE — 80069 RENAL FUNCTION PANEL: CPT | Performed by: NURSE PRACTITIONER

## 2024-04-22 PROCEDURE — 63600175 PHARM REV CODE 636 W HCPCS: Performed by: NURSE PRACTITIONER

## 2024-04-22 PROCEDURE — 27000207 HC ISOLATION

## 2024-04-22 PROCEDURE — 97112 NEUROMUSCULAR REEDUCATION: CPT

## 2024-04-22 PROCEDURE — 90935 HEMODIALYSIS ONE EVALUATION: CPT | Mod: ,,, | Performed by: NURSE PRACTITIONER

## 2024-04-22 PROCEDURE — 97530 THERAPEUTIC ACTIVITIES: CPT

## 2024-04-22 PROCEDURE — 25000003 PHARM REV CODE 250: Performed by: STUDENT IN AN ORGANIZED HEALTH CARE EDUCATION/TRAINING PROGRAM

## 2024-04-22 PROCEDURE — 85025 COMPLETE CBC W/AUTO DIFF WBC: CPT

## 2024-04-22 PROCEDURE — 97162 PT EVAL MOD COMPLEX 30 MIN: CPT

## 2024-04-22 PROCEDURE — 94761 N-INVAS EAR/PLS OXIMETRY MLT: CPT

## 2024-04-22 PROCEDURE — 25000003 PHARM REV CODE 250: Performed by: INTERNAL MEDICINE

## 2024-04-22 PROCEDURE — 63600175 PHARM REV CODE 636 W HCPCS

## 2024-04-22 PROCEDURE — 36415 COLL VENOUS BLD VENIPUNCTURE: CPT | Performed by: NURSE PRACTITIONER

## 2024-04-22 RX ADMIN — ASPIRIN 81 MG CHEWABLE TABLET 81 MG: 81 TABLET CHEWABLE at 03:04

## 2024-04-22 RX ADMIN — SEVELAMER CARBONATE 0.8 G: 2400 POWDER, FOR SUSPENSION ORAL at 09:04

## 2024-04-22 RX ADMIN — HEPARIN SODIUM 1000 UNITS: 1000 INJECTION, SOLUTION INTRAVENOUS; SUBCUTANEOUS at 11:04

## 2024-04-22 RX ADMIN — INSULIN ASPART 4 UNITS: 100 INJECTION, SOLUTION INTRAVENOUS; SUBCUTANEOUS at 06:04

## 2024-04-22 RX ADMIN — ERYTHROPOIETIN 6100 UNITS: 10000 INJECTION, SOLUTION INTRAVENOUS; SUBCUTANEOUS at 11:04

## 2024-04-22 RX ADMIN — INSULIN DETEMIR 27 UNITS: 100 INJECTION, SOLUTION SUBCUTANEOUS at 03:04

## 2024-04-22 RX ADMIN — DOCUSATE SODIUM AND SENNOSIDES 1 TABLET: 8.6; 5 TABLET, FILM COATED ORAL at 09:04

## 2024-04-22 RX ADMIN — HEPARIN SODIUM 7500 UNITS: 5000 INJECTION INTRAVENOUS; SUBCUTANEOUS at 06:04

## 2024-04-22 RX ADMIN — SEVELAMER CARBONATE 0.8 G: 2400 POWDER, FOR SUSPENSION ORAL at 03:04

## 2024-04-22 RX ADMIN — HEPARIN SODIUM 3000 UNITS: 1000 INJECTION, SOLUTION INTRAVENOUS; SUBCUTANEOUS at 08:04

## 2024-04-22 RX ADMIN — INSULIN ASPART 4 UNITS: 100 INJECTION, SOLUTION INTRAVENOUS; SUBCUTANEOUS at 12:04

## 2024-04-22 RX ADMIN — Medication 500 MG: at 09:04

## 2024-04-22 RX ADMIN — HEPARIN SODIUM 7500 UNITS: 5000 INJECTION INTRAVENOUS; SUBCUTANEOUS at 03:04

## 2024-04-22 RX ADMIN — INSULIN DETEMIR 27 UNITS: 100 INJECTION, SOLUTION SUBCUTANEOUS at 09:04

## 2024-04-22 RX ADMIN — HEPARIN SODIUM 7500 UNITS: 5000 INJECTION INTRAVENOUS; SUBCUTANEOUS at 09:04

## 2024-04-22 RX ADMIN — SODIUM CHLORIDE: 9 INJECTION, SOLUTION INTRAVENOUS at 08:04

## 2024-04-22 NOTE — ASSESSMENT & PLAN NOTE
Pt is non-verbal and does not follow commands at baseline. Vascular Neurology consulted given acute change from baseline. MRI with concern for evolution of prior strokes with noted differential of T2 hyperintensities including vasculitis vs demyelination. Discussed with Vascular Neurology; low concern for ongoing vasculitis/demyelination, likely represents typical evolution of prior infarcts.    Patient at baseline as of 4/22.     Plan  - STAT head imaging for concern of new change in mental status/focal deficit

## 2024-04-22 NOTE — PLAN OF CARE
Patient stable, non verbal, VSS.  On room air humidification via trachy mask.  Glycemic level monitored.Safety measures ensured.call light within reach.bed in low position. No distress at night.

## 2024-04-22 NOTE — PROGRESS NOTES
Patient arrived in a  bed to dialysis unit. Contact precautions maintained.  Report received from primary nurse. Tube feeding restarted on dialysis unit by pt charge nurse  VS's per dialysis Flowsheet.     Hemodialysis initiated using the following:     Dialysis Access: RIJ     Will Maintain telemetry and blood pressure monitoring throughout treatment.  Refer to dialysis flowsheet and MAR for details.

## 2024-04-22 NOTE — PLAN OF CARE
Pt engaged fairly in therapy this date, was alert but unable to respond to directives.     Problem: Occupational Therapy  Goal: Occupational Therapy Goal  Description: Goals to be met by: 5/22/24     Patient will increase functional independence with ADLs by performing:    UE Dressing with Maximum Assistance.  Grooming while EOB with Maximum Assistance.  Sitting at edge of bed x5 minutes with Maximum Assistance.  Rolling to Bilateral with Moderate Assistance.   Supine to sit with Maximum Assistance.    Outcome: Ongoing, Not Progressing

## 2024-04-22 NOTE — PROGRESS NOTES
[facility] NEPHROLOGY [facility]IS NOTE    Sarah Saravia is a 53 y.o. female currently on hemodialysis for removal of uremic toxins and volume.     Patient seen and evaluated on hemodialysis, tolerating treatment, see HD flowsheet for vitals and assessments.    No Hypotension, chest pain, shortness of breath, cramping, nausea or vomiting.      Labs have been reviewed and the dialysate bath has been adjusted.     Labs:     Recent Labs   Lab 04/17/24  0634 04/19/24  0411 04/21/24  0522   * 132* 130*   K 4.1 4.3 3.6   CL 92* 97 92*   CO2 23 20* 23   BUN 61* 56* 47*   CREATININE 6.0* 5.4* 5.2*   CALCIUM 10.0 10.2 10.4   PHOS 4.2 4.6* 4.4     Recent Labs   Lab 04/17/24  0634 04/18/24  0806 04/22/24  0725   WBC 12.68 11.30 9.07   HGB 9.2* 9.3* 10.2*   HCT 29.3* 31.0* 33.4*    220 279     Lab Results   Component Value Date    FESATURATED 10 (L) 03/15/2024    FERRITIN 414 (H) 03/15/2024        Assessment/Plan      Ultrafiltration goal: Liters: 2L. Duration:  3.5 hrs    Monday/Wednesday/Friday    - Seen on dialysis today, tolerating session with current UFR, no complications.    - Pending placement.   - No lab stick/BP intake on access site  - Continue to monitor intake and output, daily weights   - Please avoid gadolinium, fleets, phos-based laxatives, NSAIDs  - Will follow closely and continue dialysis treatments while in-patient    Anemia  - Hgb 10.2, Continue CALVIN with dialysis treatments    BMM  - Renal diet with protein intake goal 1.5 g/kg/d if appropriate   - Novasource with meals   - F/U PO4, Mg, Calcium. And albumin levels.   - Phos 4.4. Please continue Sevelamer.     HTN  - BP Normal  - Goal for BP <140mmHg SBP and <90 mmHg DBP. Maintain MAP > 65 mmHg.  - Continue home antihypertensive regimen; adjust as needed.       Shwetha Gregory, POONAM, APRN, FNP-C  Nephrology Department  Pager:  [phone]

## 2024-04-22 NOTE — PLAN OF CARE
Problem: Physical Therapy  Goal: Physical Therapy Goal  Description: Goals to be met by: 24     Patient will increase functional independence with mobility by performin. Supine to sit with Maximum Assistance  2. Sit to supine with Maximum Assistance  3. Rolling to Left and Right with Moderate Assistance.  4. Sitting at edge of bed 5 minutes with Moderate Assistance  5. Lower extremity exercise program x20 reps per handout, with assistance as needed    Outcome: Ongoing, Progressing    Evaluation Complete. Goals Appropriate.

## 2024-04-22 NOTE — SUBJECTIVE & OBJECTIVE
Interval History: NAEO. Medically stable. Pending placement.    Review of Systems   Unable to perform ROS: Patient nonverbal     Objective:     Vital Signs (Most Recent):  Temp: 98.8 °F (37.1 °C) (04/22/24 1416)  Pulse: (!) 115 (04/22/24 1416)  Resp: 20 (04/22/24 1416)  BP: 119/71 (04/22/24 1416)  SpO2: 100 % (04/22/24 1416) Vital Signs (24h Range):  Temp:  [97.7 °F (36.5 °C)-99.4 °F (37.4 °C)] 98.8 °F (37.1 °C)  Pulse:  [] 115  Resp:  [17-20] 20  SpO2:  [96 %-100 %] 100 %  BP: (101-151)/(54-87) 119/71     Weight: 122.5 kg (270 lb 1 oz)  Body mass index is 42.3 kg/m².    Intake/Output Summary (Last 24 hours) at 4/22/2024 1423  Last data filed at 4/22/2024 1130  Gross per 24 hour   Intake 1322 ml   Output 2550 ml   Net -1228 ml         Physical Exam  Vitals and nursing note reviewed.   Constitutional:       General: She is not in acute distress.     Appearance: She is not ill-appearing, toxic-appearing or diaphoretic.      Comments: Trach in place   HENT:      Head: Normocephalic and atraumatic.      Right Ear: External ear normal.      Left Ear: External ear normal.      Mouth/Throat:      Mouth: Mucous membranes are moist.      Pharynx: No oropharyngeal exudate.   Eyes:      General: No scleral icterus.     Extraocular Movements: Extraocular movements intact.      Pupils: Pupils are equal, round, and reactive to light.   Cardiovascular:      Rate and Rhythm: Normal rate and regular rhythm.      Pulses: Normal pulses.      Heart sounds: Normal heart sounds. No murmur heard.  Pulmonary:      Effort: Pulmonary effort is normal. No respiratory distress.      Breath sounds: Normal breath sounds. No wheezing or rales.   Abdominal:      General: Abdomen is flat. Bowel sounds are normal. There is no distension.      Palpations: Abdomen is soft. There is no mass.      Tenderness: There is no abdominal tenderness.      Comments: Peg tube in place   Genitourinary:     Comments: Has large bandage over groin area.  Pictures in chart  Musculoskeletal:         General: No swelling or tenderness. Normal range of motion.      Cervical back: Normal range of motion and neck supple.      Right lower leg: No edema.      Left lower leg: No edema.      Comments: Moves BUE spontaneously   Skin:     General: Skin is warm and dry.      Capillary Refill: Capillary refill takes less than 2 seconds.      Findings: Wound present.   Neurological:      General: No focal deficit present.      Mental Status: She is alert and oriented to person, place, and time. Mental status is at baseline.      Comments: Nonverbal   Psychiatric:         Mood and Affect: Mood normal.         Behavior: Behavior normal.      Comments: Nonverbal  intermittently following commands             Significant Labs: All pertinent labs within the past 24 hours have been reviewed.    Significant Imaging: I have reviewed all pertinent imaging results/findings within the past 24 hours.

## 2024-04-22 NOTE — ASSESSMENT & PLAN NOTE
Creatine stable for now. BMP reviewed- noted Estimated Creatinine Clearance: 14.7 mL/min (A) (based on SCr of 6 mg/dL (H)). according to latest data. Based on current GFR, CKD stage is end stage.  Monitor UOP and serial BMP and adjust therapy as needed. Renally dose meds. Avoid nephrotoxic medications and procedures.    -- HD MWF  -- nephro following  -- sevelamer TID

## 2024-04-22 NOTE — ASSESSMENT & PLAN NOTE
Patient's FSGs are controlled on current medication regimen.  Last A1c reviewed-   Lab Results   Component Value Date    HGBA1C 10.4 (H) 01/30/2024     Most recent fingerstick glucose reviewed-   Recent Labs   Lab 04/21/24  2124 04/22/24  0046 04/22/24  0648   POCTGLUCOSE 124* 134* 162*     Current correctional scale  Medium  Maintain anti-hyperglycemic dose as follows-   Antihyperglycemics (From admission, onward)      Start     Stop Route Frequency Ordered    04/17/24 1200  insulin aspart U-100 pen 4 Units         -- SubQ Every 6 hours 04/17/24 0938    04/17/24 0944  insulin aspart U-100 pen 0-10 Units         -- SubQ Every 6 hours PRN 04/17/24 0938    04/13/24 2100  insulin detemir U-100 (Levemir) pen 27 Units         -- SubQ 2 times daily 04/13/24 0931          Hold Oral hypoglycemics while patient is in the hospital.  Aspart 3u q4h  Levemir 27u BID  Anticipate discharge with above regimen given well controlled blood glucose while at goal TF rate  MDSSI  POCT glucose q4h, please give remaining sliding scale requirement if above the scheduled 3u  Ordered current regimen with elevation in glucose

## 2024-04-22 NOTE — ASSESSMENT & PLAN NOTE
Patient without BM x5d. One episode of vomitus night of 4/17. Some concern for bowel obstruction. Tolerating continuous tube feeds at goal rate with 0cc on residuals. Physical exam with bowel sounds, soft nontender abdomen. KUB without concern for obstruction with bowel gas, lack of transition point.    Patient now with regular bowel movements on bowel regimen.     Plan  - Miralax BID, Senna BID  - One time mag citrate per PEG ordered  - compazine PRN  - please contact MD team on call with concern for worsening nausea or abdominal pain.

## 2024-04-22 NOTE — ASSESSMENT & PLAN NOTE
Pt presenting with AMS and worsening lethargy. Pt is non-verbal at baseline, does not follow commands, but was less responsive than normal. Pt found to have a fever. Urinary source suspected based off of urine and CT abd/pelvis. Pt has many prior resistant bacterial infections including urinary sources. Has prior with sensitivity to zosyn and has also improved off ED dose of vanc/zosyn. Lactic elevated a 3.8->3.49 prior to completion of fluid bolus 500ml    Plan  S/p vanc/merrem, now transitioned to vanc/ertapenem (on 04/15) per ID. Course completed 4/16.  Daily cbc  Contact precautions

## 2024-04-22 NOTE — PROGRESS NOTES
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 History was given from patient's daughter. She was undergoing dialysis today and noticed she was lethargic, less alert than usual self. Pt completed dialysis and still not acting herself. EMS was called, fever of a 100.  On chart review, she did have an episode of large volume emesis around 1700.  Per EMS, she had a slightly elevated temp 100.0°F, glucose 300s.     In the ED: UA 2+ leuks, >100 WBC, many bacteria, WBC 17, CT abd/pelvis concerning for cystitis. Given vanc/zosyn    Overview/Hospital Course:  Patient was admitted to Hospital Medicine service for medical management and evaluation of urosepsis. Patient was continued on vanc/zosyn. Vascular Neurology consulted concerning mental status change with the recommendation to initiate ASA 81 QD and to obtain an MRI to r/o new stroke. Imaging pending. Nephrology was consulted for regularly scheduled dialysis. Afternoon of 4/12, patient was unable to tolerate filtration of volume during dialsis 2/2 hypotension. Patient received 500cc bolus and was transferred back to floor after finishing the session without volume removed. Will monitor for signs of volume overload. Tailoring insulin regimen while uptitrating tube feeds to goal rate. Staph Epi in all 4 bottles; ID with recommendation to continue Vanc & de-escalate meropenem to ertapenem on 04/15 through 4/16. Patient completed IV course of UTI coverage. Patient tolerated volume removal well in dialysis  throughout the rest of her hospital stay. Pending discharge to LTACH pending bed availability. Patient without BM with one episode of vomiting overnight 4/17. KUB with bowel gas and low concern for obstruction with no transition point noted. Escalating bowel regimen. Pending placement as of 4/22.     Interval History: NAEO. Medically stable. Pending placement.    Review of Systems   Unable to perform ROS: Patient nonverbal     Objective:     Vital Signs (Most Recent):  Temp: 98.8 °F (37.1 °C) (04/22/24 1416)  Pulse: (!) 115 (04/22/24 1416)  Resp: 20 (04/22/24 1416)  BP: 119/71 (04/22/24 1416)  SpO2: 100 % (04/22/24 1416) Vital Signs (24h Range):  Temp:  [97.7 °F (36.5 °C)-99.4 °F (37.4 °C)] 98.8 °F (37.1 °C)  Pulse:  [] 115  Resp:  [17-20] 20  SpO2:  [96 %-100 %] 100 %  BP: (101-151)/(54-87) 119/71     Weight: 122.5 kg (270 lb 1 oz)  Body mass index is 42.3 kg/m².    Intake/Output Summary (Last 24 hours) at 4/22/2024 1423  Last data filed at 4/22/2024 1130  Gross per 24 hour   Intake 1322 ml   Output 2550 ml   Net -1228 ml         Physical Exam  Vitals and nursing note reviewed.   Constitutional:       General: She is not in acute distress.     Appearance: She is not ill-appearing, toxic-appearing or diaphoretic.      Comments: Trach in place   HENT:      Head: Normocephalic and atraumatic.      Right Ear: External ear normal.      Left Ear: External ear normal.      Mouth/Throat:      Mouth: Mucous membranes are moist.      Pharynx: No oropharyngeal exudate.   Eyes:      General: No scleral icterus.     Extraocular Movements: Extraocular movements intact.      Pupils: Pupils are equal, round, and reactive to light.   Cardiovascular:      Rate and Rhythm: Normal rate and regular rhythm.      Pulses: Normal pulses.      Heart sounds: Normal heart sounds. No murmur heard.  Pulmonary:      Effort: Pulmonary effort is normal. No respiratory distress.      Breath sounds: Normal breath sounds. No wheezing or rales.    Abdominal:      General: Abdomen is flat. Bowel sounds are normal. There is no distension.      Palpations: Abdomen is soft. There is no mass.      Tenderness: There is no abdominal tenderness.      Comments: Peg tube in place   Genitourinary:     Comments: Has large bandage over groin area. Pictures in chart  Musculoskeletal:         General: No swelling or tenderness. Normal range of motion.      Cervical back: Normal range of motion and neck supple.      Right lower leg: No edema.      Left lower leg: No edema.      Comments: Moves BUE spontaneously   Skin:     General: Skin is warm and dry.      Capillary Refill: Capillary refill takes less than 2 seconds.      Findings: Wound present.   Neurological:      General: No focal deficit present.      Mental Status: She is alert and oriented to person, place, and time. Mental status is at baseline.      Comments: Nonverbal   Psychiatric:         Mood and Affect: Mood normal.         Behavior: Behavior normal.      Comments: Nonverbal  intermittently following commands             Significant Labs: All pertinent labs within the past 24 hours have been reviewed.    Significant Imaging: I have reviewed all pertinent imaging results/findings within the past 24 hours.    Assessment/Plan:      * Acute cystitis with hematuria  Pt presenting with AMS and worsening lethargy. Pt is non-verbal at baseline, does not follow commands, but was less responsive than normal. Pt found to have a fever. Urinary source suspected based off of urine and CT abd/pelvis. Pt has many prior resistant bacterial infections including urinary sources. Has prior with sensitivity to zosyn and has also improved off ED dose of vanc/zosyn. Lactic elevated a 3.8->3.49 prior to completion of fluid bolus 500ml    Plan  S/p vanc/merrem, now transitioned to vanc/ertapenem (on 04/15) per ID. Course completed 4/16.  Daily cbc  Contact precautions    Constipation  Patient without BM x5d. One episode of vomitus  night of 4/17. Some concern for bowel obstruction. Tolerating continuous tube feeds at goal rate with 0cc on residuals. Physical exam with bowel sounds, soft nontender abdomen. KUB without concern for obstruction with bowel gas, lack of transition point.    Patient now with regular bowel movements on bowel regimen.     Plan  - Miralax BID, Senna BID  - One time mag citrate per PEG ordered  - compazine PRN  - please contact MD team on call with concern for worsening nausea or abdominal pain.    Moderate malnutrition  Nutrition consulted. Most recent weight and BMI monitored-     Measurements:  Wt Readings from Last 1 Encounters:   04/18/24 122.5 kg (270 lb 1 oz)   Body mass index is 42.3 kg/m².    Patient has been screened and assessed by RD.    Malnutrition Type:  Context: acute illness or injury  Level: moderate    Malnutrition Characteristic Summary:  Weight Loss (Malnutrition): 10% in 6 months  Subcutaneous Fat (Malnutrition): mild depletion  Muscle Mass (Malnutrition): mild depletion  Fluid Accumulation (Malnutrition): mild    Interventions/Recommendations (treatment strategy):  1.      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 Physical therapy services has not been scheduled. Continue all standard measures for pressure injury prevention and consult wound care for any wounds (chronic or acute).    ESRD (end stage renal disease) on dialysis  Creatine stable for now. BMP reviewed- noted Estimated Creatinine Clearance: 14.7 mL/min (A) (based on SCr of 6 mg/dL (H)). according to latest data. Based on current GFR, CKD stage is end stage.  Monitor UOP and serial BMP and adjust therapy as needed. Renally dose meds. Avoid  nephrotoxic medications and procedures.    -- HD MWF  -- nephro following  -- sevelamer TID    Acute encephalopathy  Pt is non-verbal and does not follow commands at baseline. Vascular Neurology consulted given acute change from baseline. MRI with concern for evolution of prior strokes with noted differential of T2 hyperintensities including vasculitis vs demyelination. Discussed with Vascular Neurology; low concern for ongoing vasculitis/demyelination, likely represents typical evolution of prior infarcts.    Patient at baseline as of 4/22.     Plan  - STAT head imaging for concern of new change in mental status/focal deficit    Type 2 diabetes mellitus with hyperglycemia, with long-term current use of insulin  Patient's FSGs are controlled on current medication regimen.  Last A1c reviewed-   Lab Results   Component Value Date    HGBA1C 10.4 (H) 01/30/2024     Most recent fingerstick glucose reviewed-   Recent Labs   Lab 04/21/24  2124 04/22/24  0046 04/22/24  0648   POCTGLUCOSE 124* 134* 162*     Current correctional scale  Medium  Maintain anti-hyperglycemic dose as follows-   Antihyperglycemics (From admission, onward)      Start     Stop Route Frequency Ordered    04/17/24 1200  insulin aspart U-100 pen 4 Units         -- SubQ Every 6 hours 04/17/24 0938    04/17/24 0944  insulin aspart U-100 pen 0-10 Units         -- SubQ Every 6 hours PRN 04/17/24 0938    04/13/24 2100  insulin detemir U-100 (Levemir) pen 27 Units         -- SubQ 2 times daily 04/13/24 0931          Hold Oral hypoglycemics while patient is in the hospital.  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 Pt underwent  extensive course, currently still healing from this, but no longer has wound vac. Pt's wound currently covered with bandage. Picture in media tabs    Plan  Wound care        VTE Risk Mitigation (From admission, onward)           Ordered     heparin (porcine) injection 3,000 Units  As needed (PRN)         04/17/24 0825     heparin (porcine) injection 1,000 Units  As needed (PRN)         04/12/24 0951     heparin (porcine) injection 7,500 Units  Every 8 hours         04/11/24 0252                    Discharge Planning   ZEKE: 4/24/2024     Code Status: Full Code   Is the patient medically ready for discharge?: No    Reason for patient still in hospital (select all that apply): Pending disposition  Discharge Plan A: Long-term acute care facility (LTAC)              Raoul Harris MD  Department of Hospital Medicine   [facility] - Telemetry Stepdown

## 2024-04-22 NOTE — PT/OT/SLP EVAL
Physical Therapy Co-Evaluation    Patient Name:  Sarah Saravia   MRN:  [id number]    Recommendations:     Discharge Recommendations: Moderate Intensity Therapy   Discharge Equipment Recommendations: to be determined by next level of care   Barriers to discharge:  Increased skilled assistance required    Assessment:   Co-evaluation performed due to multiple deficits anticipated requiring two skilled therapists to appropriately and safely assess patient's strength, endurance, functional mobility, and ADL performance while facilitating functional tasks in addition to accommodating for patient's activity tolerance and medical acuity.    Sarah Saravia is a 53 y.o. female admitted with a medical diagnosis of Acute cystitis with hematuria.  She presents with the following impairments/functional limitations: weakness, impaired endurance, impaired self care skills, impaired functional mobility, impaired balance, decreased lower extremity function, decreased upper extremity function, abnormal tone, decreased ROM, impaired coordination, impaired fine motor, edema, impaired joint extensibility. Obtained information limited by little-no communication by patient, verbally and/or non-verbally, and poor command following. Patient required significantly increased physical assistance of 2 persons for bed mobility completed. Max HR during bed mobility noted to be 122bpm. Evaluation was also limited due to PT/OT receiving patient soiled upon entry. Based upon information gained, PT recommends moderate intensity skilled physical therapy services post-acutely. Provided recommendation based upon needed intensity to not only directly address patient's previously listed functional impairments, discrepancy between functional baseline & current mobility status, and increased falls risk, but to also positively impact patient's quality of life.    Rehab Prognosis: Fair; patient would benefit from acute skilled PT services to address these  "deficits and reach maximum level of function.    Recent Surgery: * No surgery found *      Plan:     During this hospitalization, patient to be seen 3 x/week to address the identified rehab impairments via therapeutic activities, therapeutic exercises, neuromuscular re-education and progress toward the following goals:    Plan of Care Expires:  05/22/24    Subjective     Chief Complaint: None communicated  Patient/Family Comments/goals: None communicated  Pain/Comfort:  Pain Rating 1: 0/10  Pain Rating Post-Intervention 1: 0/10    Patients cultural, spiritual, Islam conflicts given the current situation: no    Social History: (information obtained via chart review from Evaluations dated 03/06/24, 03/08/24, & 03/13/24 and from progress note from 04/09/24)  Residence: Patient lives with their significant other in a single story house with  a TTE  Equipment Owned: None  Prior level of function:    At baseline, patient was independent for ambulation, mobility, and ADLs.    Prior to admission, patient receiving PT services at [facility] where she required  total assistance  for mobility.  Assistance Upon Discharge:  Unknown at this time    Objective:     Communicated with RN prior to session.  Patient found HOB elevated with telemetry, PEG Tube, Tracheostomy  upon PT entry to room.    General Precautions: Standard, NPO, contact, fall, aphasia, diabetic   Orthopedic Precautions:N/A   Braces: N/A   Body mass index is 42.3 kg/m².  Oxygen Device: Trach Collar  Vitals: /79 (BP Location: Left arm, Patient Position: Lying)   Pulse (!) 112   Temp 99.1 °F (37.3 °C) (Oral)   Resp 16   Ht 5' 7" (1.702 m)   Wt 122.5 kg (270 lb 1 oz)   SpO2 99%   BMI 42.30 kg/m²     Exams:  Cognition:   Alert   Patient is oriented to unable to assess due to aphasia  Command following: Follows one-step verbal commands on occasion  Fluency: non-verbal  Physical Exam:    Left LE Right LE   Sensation Difficult to assess 2/2 to minimal-no " communication (verbal/non-verbal) this date. Difficult to assess 2/2 to minimal-no communication (verbal/non-verbal) this date.   Coordination Difficult to assess 2/2 impaired command following this date Difficult to assess 2/2 impaired command following this date     LLE ROM:   AROM:  Unable to assess 2/2 impaired command following this date  PROM: Global Deficits, especially hip/knee flexion. Muscular end-feel noted for all motions  RLE ROM:   AROM: Unable to assess 2/2 impaired command following this date  PROM: Global Deficits, especially hip/knee flexion. Muscular end-feel noted for all motions    LLE Strength: Unable to assess 2/2 impaired command following this date  RLE Strength: Unable to assess 2/2 impaired command following this date    Functional Mobility:  Bed Mobility: Cuing required for breakdown of complex motor sequence into single steps   Rolling Left: x2, TotalAx2 with HOB Flat. Facilitation of cross-body reaching required this date  Rolling Right: x2, TotalAx2 with HOB Flat. Facilitation of cross-body reaching required this date  Boosting: TotalAx2 with HOB Flat & in trendelenburg position    Balance:  Sidelying: Cuing for contralateral hand to hold bed rail.  Left: TotalAx2  Right: TotalAx2    AM-PAC 6 CLICK MOBILITY  Total Score:8     Treatment & Education:  Increased time required due to receiving patient soiled and associated pericare  Increased time spent with patient for continued monitoring of patient's VS response with activities completed    Patient Education Provided on:  The role of physical therapy and how the patient can benefit from skilled services  The negative effects of prolonged bed rest/sedentary behavior, along with the importance of OOB activity & patient participation with PT  The importance of contacting RN, via call light, for mobility throughout the day  Pt white board updated with current therapists name and level of mobility assistance needed.     Patient left HOB  elevated with all lines intact, call button in reach, and RN notified.    GOALS:   Multidisciplinary Problems       Physical Therapy Goals          Problem: Physical Therapy    Goal Priority Disciplines Outcome Goal Variances Interventions   Physical Therapy Goal     PT, PT/OT Ongoing, Progressing     Description: Goals to be met by: 24     Patient will increase functional independence with mobility by performin. Supine to sit with Maximum Assistance  2. Sit to supine with Maximum Assistance  3. Rolling to Left and Right with Moderate Assistance.  4. Sitting at edge of bed 5 minutes with Moderate Assistance  5. 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     Morbid obesity     Necrotizing fasciitis        Past Surgical History:   Procedure Laterality Date    CLOSURE OF WOUND Right 2024    Procedure: CLOSURE, WOUND;  Surgeon: Steve Cole MD;  Location: [address];  Service: General;  Laterality: Right;    ESOPHAGOGASTRODUODENOSCOPY W/ PEG N/A 2024    Procedure: EGD, WITH PEG TUBE INSERTION;  Surgeon: Steve Cole MD;  Location: [address];  Service: General;  Laterality: N/A;    INCISION AND DRAINAGE OF PERIRECTAL REGION N/A 2024    Procedure: INCISION AND DRAINAGE, PERIRECTAL REGION;  Surgeon: Axel Ramsay MD;  Location: [facility];  Service: General;  Laterality: N/A;    LUMBAR PUNCTURE N/A 2024    Procedure: Lumbar Puncture;  Surgeon: Macy Boykin;  Location: [facility];  Service: Anesthesiology;  Laterality: N/A;    PLACEMENT, TRIALYSIS CATH Right 2024    Procedure: INSERTION, CATHETER, TRIPLE LUMEN, HEMODIALYSIS, TEMPORARY;  Surgeon: Alvin Junior MD;  Location: [facility];  Service: General;  Laterality: Right;    REPLACEMENT OF WOUND VACUUM-ASSISTED CLOSURE DEVICE Right 2024     Procedure: REPLACEMENT, WOUND VAC;  Surgeon: Mundo Carmona MD;  [facility];  Service: General;  Laterality: Right;    REPLACEMENT OF WOUND VACUUM-ASSISTED CLOSURE DEVICE N/A 2/15/2024    Procedure: REPLACEMENT, WOUND VAC;  Surgeon: Steve Cole MD;  [facility];  Service: General;  Laterality: N/A;    REPLACEMENT OF WOUND VACUUM-ASSISTED CLOSURE DEVICE Right 2/19/2024    Procedure: REPLACEMENT, WOUND VAC;  Surgeon: Steve Cole MD;  [facility];  Service: General;  Laterality: Right;  RLE/groin    REPLACEMENT OF WOUND VACUUM-ASSISTED CLOSURE DEVICE Right 2/22/2024    Procedure: REPLACEMENT, WOUND VAC;  Surgeon: Steve Cole MD;  [facility];  Service: General;  Laterality: Right;    TRACHEOSTOMY N/A 2/22/2024    Procedure: CREATION, TRACHEOSTOMY;  Surgeon: Steve Cole MD;  [facility];  Service: General;  Laterality: N/A;    WOUND DEBRIDEMENT Bilateral 2/2/2024    Procedure: DEBRIDEMENT, WOUND;  Surgeon: Steve Cole MD;  Location: [address];  Service: General;  Laterality: Bilateral;  Bilateral groin  Possible wound vac placement    WOUND DEBRIDEMENT Right 2/6/2024    Procedure: DEBRIDEMENT, WOUND, replace wound vac, possible closure;  Surgeon: Steve Cole MD;  Location: [facility] OR [address] [place] [address];  Service: General;  Laterality: Right;  RLE    WOUND DEBRIDEMENT Right 2/9/2024    Procedure: DEBRIDEMENT, WOUND w wound vac change;  Surgeon: Steve Cole MD;  [facility];  Service: General;  Laterality: Right;  RLE    WOUND DEBRIDEMENT Right 2/12/2024    Procedure: R thigh wound debridement;  Surgeon: Mundo Carmona MD;  [facility];  Service: General;  Laterality: Right;    WOUND EXPLORATION Right 1/31/2024    Procedure: IRRIGATION & DEBRIDEMENT, WOUND DEBRIDEMENT;  Surgeon: Alvin Junior MD;  Location: [facility];  Service: General;  Laterality: Right;       Time Tracking:     PT Received On:  04/22/24  PT Start Time: 1442     PT Stop Time: 1524  PT Total Time (min): 42 min     Billable Minutes: Evaluation 8, Therapeutic Activity 22, and Neuromuscular Re-education 12    04/22/2024;

## 2024-04-22 NOTE — PT/OT/SLP EVAL
Occupational Therapy  Co -  Evaluation and Co - Treatment with PT    Co-evaluation/treatment performed due to patient's multiple deficits requiring two skilled therapists to appropriately and safely assess patient's strength and endurance while facilitating functional tasks in addition to accommodating for patient's activity tolerance.     Other Staff Present:   GONZALEZ Car (partial)      Name: Sarah Saravia  MRN: [id number]  Admitting Diagnosis: Acute cystitis with hematuria  Recent Surgery: * No surgery found *      Recommendations:     Discharge Recommendations: Moderate Intensity Therapy  Discharge Equipment Recommendations:  to be determined by next level of care  Barriers to discharge:  None    Assessment:     Sarah Saravia is a 53 y.o. female with a medical diagnosis of Acute cystitis with hematuria.  She presents with performance deficits affecting function: weakness, impaired endurance, impaired self care skills, impaired functional mobility, impaired balance, impaired cognition, decreased coordination, decreased upper extremity function, decreased lower extremity function, decreased safety awareness, pain, impaired coordination, impaired fine motor, decreased ROM, impaired skin.      Pt arrived from LT[facility], encountered supine in bed with HOB raised, alert and able to visually track therapists, but presented as non-verbal and unable to communicate responses to questions with hands, or follow general directives.  Pt encountered soiled and OT/PT attempted to complete hygiene but required additional assistance 2* pt's habitus and inability to participate in bed mobility. Increased time needed for PCT arrival, in which case pt rolled bilateral with total assistance of 2 persons, unable to position hands or legs despite multiple cues. Pt able to position hands under the blanket and occasionally required maximum multisensory cues to reposition hands to avoid fidgeting with PEG tube, sensory fidgets provided.  Gely-care completed and pt positioned in bed with HOB raised. Patient currently demonstrates a need for moderate intensity therapy on a daily basis post acute secondary to a decline in functional status due to illness.       Rehab Prognosis: Fair; patient would benefit from acute skilled OT services to address these deficits and reach maximum level of function.       Plan:     Patient to be seen 3 x/week to address the above listed problems via self-care/home management, therapeutic activities, therapeutic exercises  Plan of Care Expires: 05/22/24  Plan of Care Reviewed with: patient    Subjective     Chief Complaint: None communicated this date  Patient/Family Comments/goals: Get better    Occupational Profile: Pt nonverbal this date, information taken from previous visit 1/29/24  Living Environment: Prior to first admit, pt lived with SO in [facility] with 1 JERAD   Previous level of function: Prior to first admit: Independent in ADLs and functional mobility  Roles and Routines: 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  Patient found HOB elevated with blood pressure cuff, PEG Tube, Tracheostomy, telemetry, peripheral IV, PureWick upon OT entry to room.    General Precautions: Standard, aphasia, contact, NPO  Orthopedic Precautions: N/A  Braces: N/A  Respiratory Status: Room air    Occupational Performance:    Bed Mobility:    Patient completed Rolling/Turning to Left with  total assistance and 2 persons across 2 trials  Patient completed Rolling/Turning to Right with total assistance and 2 persons across 2 trials  Patient completed Scooting/Bridging  with total assistance and 2 persons to HOB while supine, with bed in trendelenburg across 2 trials    Functional Mobility/Transfers:  Pt unable to follow commands/directives, not attempted this date    Activities of Daily Living:  Lower Body Dressing: total assistance donning bilateral socks  Toileting: total assistance and of 3 persons 2* body habitus and low command/directive following. OT/PT initially began anterior belgica-care but had difficulty rolling pt 2* body habitus so PCT called and additional supplies gathered. With PCT present, OT/PT positioned pt and held pt in position while guarding PEG tube, and alternated with PCT to complete posterior belgica-care and hold pt in safe position     Cognitive/Visual Perceptual:  Cognitive/Psychosocial Skills:     -       Oriented to: Alert, not oriented   -       Follows Commands/attention:Does not follow commands, demonstrates some visual tracking but has low attention   -       Communication: nonverbal, unable to use hands to respond to answers   -       Memory: unable to ascertain   -       Safety awareness/insight to disability: impaired   -       Mood/Affect/Coping skills/emotional control: Appropriate to situation  Visual/Perceptual:      -Pt demonstrated tracking, not pursuit with directives      Physical Exam:  Balance:    -       Impaired  Postural examination/scapula alignment:    -       Rounded shoulders  Skin integrity: Dry  Edema:  Mild BLE  Motor Planning: Impaired  Dominant hand: Unknown  Neurological: Impaired    AMPAC 6 Click ADL:  AMPAC Total Score: 6    Treatment & Education:  Pt educated on role of OT, POC, and goals for therapy.    POC was dicussed with patient/caregiver, who was included in its development and is in agreement with the identified goals and treatment plan.   Patient and family aware of patient's deficits and therapy progression.   Time provided for therapeutic counseling and discussion of health disposition.   Educated on importance  of EOB/OOB mobility, maintaining routine, sitting up in chair, and maximizing independence with ADLs during admission   Pt completed ADLs and functional mobility for treatment session as noted above   Pt/caregiver verbalized understanding and expressed no further concerns/questions.  Updated communication board with level of assist required       Patient left HOB elevated with all lines intact, call button in reach, and RN notified    GOALS:   Multidisciplinary Problems       Occupational Therapy Goals          Problem: Occupational Therapy    Goal Priority Disciplines Outcome Interventions   Occupational Therapy Goal     OT, PT/OT Ongoing, Not Progressing    Description: Goals to be met by: 5/22/24     Patient will increase functional independence with ADLs by performing:    UE Dressing with Maximum Assistance.  Grooming while EOB with Maximum Assistance.  Sitting at edge of bed x5 minutes with Maximum Assistance.  Rolling to Bilateral with Moderate Assistance.   Supine to sit with Maximum Assistance.                         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    Morbid obesity     Necrotizing fasciitis          Past Surgical History:   Procedure Laterality Date    CLOSURE OF WOUND Right 2/22/2024    Procedure: CLOSURE, WOUND;  Surgeon: Steve Cole MD;  Location: [address];  Service: General;  Laterality: Right;    ESOPHAGOGASTRODUODENOSCOPY W/ PEG N/A 2/22/2024    Procedure: EGD, WITH PEG TUBE INSERTION;  Surgeon: Steve Cole MD;  Location: [address];  Service: General;  Laterality: N/A;    INCISION AND DRAINAGE OF PERIRECTAL REGION N/A 1/29/2024    Procedure: INCISION AND DRAINAGE, PERIRECTAL REGION;  Surgeon: Axel Ramsay MD;  Location: [facility];  Service: General;  Laterality: N/A;    LUMBAR PUNCTURE N/A 2/16/2024    Procedure: Lumbar Puncture;  Surgeon: Macy Boykin;  Location: [facility];  Service:  Anesthesiology;  Laterality: N/A;    PLACEMENT, TRIALYSIS CATH Right 1/31/2024    Procedure: INSERTION, CATHETER, TRIPLE LUMEN, HEMODIALYSIS, TEMPORARY;  Surgeon: Alvin Junior MD;  Location: [facility] OR;  Service: General;  Laterality: Right;    REPLACEMENT OF WOUND VACUUM-ASSISTED CLOSURE DEVICE Right 2/12/2024    Procedure: REPLACEMENT, WOUND VAC;  Surgeon: Mundo Carmona MD;  Location: [facility] OR 2ND FLR;  Service: General;  Laterality: Right;    REPLACEMENT OF WOUND VACUUM-ASSISTED CLOSURE DEVICE N/A 2/15/2024    Procedure: REPLACEMENT, WOUND VAC;  Surgeon: Steve Coel MD;  Location: [facility] OR 2ND FLR;  Service: General;  Laterality: N/A;    REPLACEMENT OF WOUND VACUUM-ASSISTED CLOSURE DEVICE Right 2/19/2024    Procedure: REPLACEMENT, WOUND VAC;  Surgeon: Steve Cole MD;  Location: [facility] OR [facility] FLR;  Service: General;  Laterality: Right;  RLE/groin    REPLACEMENT OF WOUND VACUUM-ASSISTED CLOSURE DEVICE Right 2/22/2024    Procedure: REPLACEMENT, WOUND VAC;  Surgeon: Steve Cole MD;  Location: [facility] OR [facility] FLR;  Service: General;  Laterality: Right;    TRACHEOSTOMY N/A 2/22/2024    Procedure: CREATION, TRACHEOSTOMY;  Surgeon: Steve Cole MD;  Location: [facility] OR [facility] FLR;  Service: General;  Laterality: N/A;    WOUND DEBRIDEMENT Bilateral 2/2/2024    Procedure: DEBRIDEMENT, WOUND;  Surgeon: Steve Cole MD;  [facility];  Service: General;  Laterality: Bilateral;  Bilateral groin  Possible wound vac placement    WOUND DEBRIDEMENT Right 2/6/2024    Procedure: DEBRIDEMENT, WOUND, replace wound vac, possible closure;  Surgeon: Steve Cole MD;  Location: [facility] OR [facility] FLR;  Service: General;  Laterality: Right;  RLE    WOUND DEBRIDEMENT Right 2/9/2024    Procedure: DEBRIDEMENT, WOUND w wound vac change;  Surgeon: Steve Cole MD;  Location: [facility] OR 2ND FLR;  Service: General;  Laterality: Right;  RLE    WOUND DEBRIDEMENT Right 2/12/2024    Procedure: R thigh wound debridement;  Surgeon:  Mundo Carmona MD;  [facility];  Service: General;  Laterality: Right;    WOUND EXPLORATION Right 1/31/2024    Procedure: IRRIGATION & DEBRIDEMENT, WOUND DEBRIDEMENT;  Surgeon: Alvin Junior MD;  Location: [facility];  Service: General;  Laterality: Right;       Time Tracking:     OT Date of Treatment: 04/22/24  OT Start Time: 1442  OT Stop Time: 1524  OT Total Time (min): 42 min    Billable Minutes:Evaluation 8  Self Care/Home Management 34    4/22/2024

## 2024-04-22 NOTE — PROGRESS NOTES
04/22/24 1130   Post-Hemodialysis Assessment   Rinseback Volume (mL) 250 mL   Blood Volume Processed (Liters) 65.6 L   Dialyzer Clearance Moderately streaked   Duration of Treatment 195 minutes   Additional Fluid Intake (mL) 300 mL   Total UF (mL) 2550 mL   Net Fluid Removal 2000   Patient Response to Treatment tolerated well   Post-Treatment Weight   (arrived via bed)   Post-Hemodialysis Comments see notes     HD TX complete. Pt tolerated well. NAD. TX end with 15 min remain in TX time due to increased TMP. Net removal 2000 ml. Pt awake, alert. Report given to charge ELISABETH Guerra,  unable to reach primary nurse at time of transfer. Pt returned to room via bed escorted by transport staff. NAD

## 2024-04-22 NOTE — RESPIRATORY THERAPY
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  Clinically Significant Surgical Hx: tracheostomy    24 Hours Vitals Range:  Temp:  [97.7 °F (36.5 °C)-99.4 °F (37.4 °C)]   Pulse:  []   Resp:  [17-20]   BP: (101-151)/(54-87)   SpO2:  [96 %-100 %]     Labs:    Recent Labs     24  0524  0943   * 132*   K 3.6 4.1   CL 92* 95   CO2 23 24   BUN 47* 66*   CREATININE 5.2* 6.0*   * 146*   PHOS 4.4 5.2*   MG 2.5  --         No results for input(s): "PH", "PCO2", "PO2", "HCO3", "POCSATURATED", "BE" in the last 72 hours.    ASSESSMENT/INTERVENTIONS  Patient unavailable      Last VS   Temp: 98.8 °F (37.1 °C) ( 141)  Pulse: 115 ( 141)  Resp: 20 ( 141)  BP: 119/71 (1416)  SpO2: 100 % (1416)      Extra trachs at bedside: NA  Level of Consciousness: Level of Consciousness (AVPU): alert  Respiratory Effort: Respiratory Effort: Normal, Unlabored Expansion/Accessory Muscle Usage: Expansion/Accessory Muscles/Retractions: no use of accessory muscles, no retractions, expansion symmetric  All Lung Field Breath Sounds: All Lung Fields Breath Sounds: Anterior:, Lateral:, diminished  JOSE Breath Sounds: diminished  LLL Breath Sounds: diminished  RUL Breath Sounds: diminished  RML Breath Sounds: diminished  RLL Breath Sounds: diminished  O2 Device/Concentration: NA  Surgical airway: Yes, Type: Shiley Size: 6, cuffed  Ambu at bedside:       Active Orders   Respiratory Care    Oxygen Continuous     Frequency: Continuous     Number of Occurrences: Until Specified     Order Questions:      Device type: Low flow      Device: Trach Collar      " FiO2%: 28%      Titrate O2 per Oxygen Titration Protocol: Yes      To maintain SpO2 goal of: >= 90%      Notify MD of: Inability to achieve desired SpO2; Sudden change in patient status and requires 20% increase in FiO2; Patient requires >60% FiO2    Pulse Oximetry Continuous     Frequency: Continuous     Number of Occurrences: Until Specified    Routine tracheostomy care     Frequency: BID     Number of Occurrences: Until Specified       RECOMMENDATIONS    We recommend: RRT Recs: Continue POC per primary team.      FOLLOW-UP    Please call back the Rapid Response RT, Yadi Olmos RRT at x [phone] for any questions or concerns.

## 2024-04-23 LAB
ALBUMIN SERPL BCP-MCNC: 3 G/DL (ref 3.5–5.2)
ANION GAP SERPL CALC-SCNC: 13 MMOL/L (ref 8–16)
BUN SERPL-MCNC: 33 MG/DL (ref 6–20)
CALCIUM SERPL-MCNC: 10.3 MG/DL (ref 8.7–10.5)
CHLORIDE SERPL-SCNC: 96 MMOL/L (ref 95–110)
CO2 SERPL-SCNC: 22 MMOL/L (ref 23–29)
CREAT SERPL-MCNC: 4.4 MG/DL (ref 0.5–1.4)
EST. GFR  (NO RACE VARIABLE): 11.4 ML/MIN/1.73 M^2
GLUCOSE SERPL-MCNC: 153 MG/DL (ref 70–110)
MAGNESIUM SERPL-MCNC: 2.3 MG/DL (ref 1.6–2.6)
PHOSPHATE SERPL-MCNC: 3.6 MG/DL (ref 2.7–4.5)
POCT GLUCOSE: 116 MG/DL (ref 70–110)
POCT GLUCOSE: 142 MG/DL (ref 70–110)
POCT GLUCOSE: 157 MG/DL (ref 70–110)
POCT GLUCOSE: 159 MG/DL (ref 70–110)
POCT GLUCOSE: 162 MG/DL (ref 70–110)
POCT GLUCOSE: 166 MG/DL (ref 70–110)
POCT GLUCOSE: 167 MG/DL (ref 70–110)
POCT GLUCOSE: 182 MG/DL (ref 70–110)
POCT GLUCOSE: 186 MG/DL (ref 70–110)
POTASSIUM SERPL-SCNC: 4 MMOL/L (ref 3.5–5.1)
SODIUM SERPL-SCNC: 131 MMOL/L (ref 136–145)

## 2024-04-23 PROCEDURE — 20600001 HC STEP DOWN PRIVATE ROOM

## 2024-04-23 PROCEDURE — 80069 RENAL FUNCTION PANEL: CPT

## 2024-04-23 PROCEDURE — 27000207 HC ISOLATION

## 2024-04-23 PROCEDURE — 25000003 PHARM REV CODE 250

## 2024-04-23 PROCEDURE — 25000003 PHARM REV CODE 250: Performed by: STUDENT IN AN ORGANIZED HEALTH CARE EDUCATION/TRAINING PROGRAM

## 2024-04-23 PROCEDURE — 63600175 PHARM REV CODE 636 W HCPCS

## 2024-04-23 PROCEDURE — 99900035 HC TECH TIME PER 15 MIN (STAT)

## 2024-04-23 PROCEDURE — 83735 ASSAY OF MAGNESIUM: CPT

## 2024-04-23 PROCEDURE — 99900026 HC AIRWAY MAINTENANCE (STAT)

## 2024-04-23 PROCEDURE — 86580 TB INTRADERMAL TEST: CPT | Performed by: HOSPITALIST

## 2024-04-23 PROCEDURE — 30200315 PPD INTRADERMAL TEST REV CODE 302: Performed by: HOSPITALIST

## 2024-04-23 PROCEDURE — 97530 THERAPEUTIC ACTIVITIES: CPT

## 2024-04-23 PROCEDURE — 25000003 PHARM REV CODE 250: Performed by: INTERNAL MEDICINE

## 2024-04-23 PROCEDURE — 94761 N-INVAS EAR/PLS OXIMETRY MLT: CPT

## 2024-04-23 PROCEDURE — 27000221 HC OXYGEN, UP TO 24 HOURS

## 2024-04-23 PROCEDURE — 36415 COLL VENOUS BLD VENIPUNCTURE: CPT

## 2024-04-23 PROCEDURE — 97112 NEUROMUSCULAR REEDUCATION: CPT | Mod: CQ

## 2024-04-23 RX ORDER — SODIUM CHLORIDE 9 MG/ML
INJECTION, SOLUTION INTRAVENOUS ONCE
Status: COMPLETED | OUTPATIENT
Start: 2024-04-24 | End: 2024-04-24

## 2024-04-23 RX ADMIN — TUBERCULIN PURIFIED PROTEIN DERIVATIVE 5 UNITS: 5 INJECTION, SOLUTION INTRADERMAL at 02:04

## 2024-04-23 RX ADMIN — INSULIN ASPART 4 UNITS: 100 INJECTION, SOLUTION INTRAVENOUS; SUBCUTANEOUS at 12:04

## 2024-04-23 RX ADMIN — INSULIN ASPART 4 UNITS: 100 INJECTION, SOLUTION INTRAVENOUS; SUBCUTANEOUS at 06:04

## 2024-04-23 RX ADMIN — INSULIN ASPART 2 UNITS: 100 INJECTION, SOLUTION INTRAVENOUS; SUBCUTANEOUS at 12:04

## 2024-04-23 RX ADMIN — HEPARIN SODIUM 7500 UNITS: 5000 INJECTION INTRAVENOUS; SUBCUTANEOUS at 09:04

## 2024-04-23 RX ADMIN — SEVELAMER CARBONATE 0.8 G: 2400 POWDER, FOR SUSPENSION ORAL at 04:04

## 2024-04-23 RX ADMIN — SEVELAMER CARBONATE 0.8 G: 2400 POWDER, FOR SUSPENSION ORAL at 09:04

## 2024-04-23 RX ADMIN — DOCUSATE SODIUM AND SENNOSIDES 1 TABLET: 8.6; 5 TABLET, FILM COATED ORAL at 09:04

## 2024-04-23 RX ADMIN — Medication 500 MG: at 09:04

## 2024-04-23 RX ADMIN — INSULIN DETEMIR 27 UNITS: 100 INJECTION, SOLUTION SUBCUTANEOUS at 09:04

## 2024-04-23 RX ADMIN — ASPIRIN 81 MG CHEWABLE TABLET 81 MG: 81 TABLET CHEWABLE at 09:04

## 2024-04-23 RX ADMIN — HEPARIN SODIUM 7500 UNITS: 5000 INJECTION INTRAVENOUS; SUBCUTANEOUS at 02:04

## 2024-04-23 RX ADMIN — PANTOPRAZOLE SODIUM 40 MG: 40 GRANULE, DELAYED RELEASE ORAL at 09:04

## 2024-04-23 RX ADMIN — POLYETHYLENE GLYCOL 3350 17 G: 17 POWDER, FOR SOLUTION ORAL at 09:04

## 2024-04-23 RX ADMIN — ZINC SULFATE 220 MG (50 MG) CAPSULE 220 MG: CAPSULE at 09:04

## 2024-04-23 RX ADMIN — INSULIN ASPART 2 UNITS: 100 INJECTION, SOLUTION INTRAVENOUS; SUBCUTANEOUS at 05:04

## 2024-04-23 RX ADMIN — HEPARIN SODIUM 7500 UNITS: 5000 INJECTION INTRAVENOUS; SUBCUTANEOUS at 06:04

## 2024-04-23 RX ADMIN — INSULIN ASPART 4 UNITS: 100 INJECTION, SOLUTION INTRAVENOUS; SUBCUTANEOUS at 05:04

## 2024-04-23 NOTE — PT/OT/SLP PROGRESS
"Occupational Therapy  Co -  Treatment with PTA    Co-evaluation/treatment performed due to patient's multiple deficits requiring two skilled therapists to appropriately and safely assess patient's strength and endurance while facilitating functional tasks in addition to accommodating for patient's activity tolerance.      Name: Sarah Saravia  MRN: [id number]  Admitting Diagnosis:  Acute cystitis with hematuria       Recommendations:     Discharge Recommendations: Moderate Intensity Therapy  Discharge Equipment Recommendations:  to be determined by next level of care  Barriers to discharge:  None    Assessment:     Sarah Saravia is a 53 y.o. female with a medical diagnosis of Acute cystitis with hematuria.  She presents with performance deficits affecting function are weakness, impaired endurance, impaired self care skills, impaired functional mobility, impaired balance, impaired cognition, decreased coordination, decreased upper extremity function, decreased lower extremity function, decreased safety awareness, pain, impaired coordination, impaired fine motor, decreased ROM, impaired skin.     Pt demonstrated improved communication this date, still nonverbal but this date she demo'd alertness and she was able to moan an agreement answer to some questions including: "Do you like this TV show?" "Can I move your arm?"  She occasionally was able to hold eye contact, but did not present with visual tracking or pursuit. She also grimaced during PROM activity for BUE at shoulder level and BLE at hip level. Beyond this interaction, pt presented with flat effect until sitting EOB and demonstrating anxiety, trying to push posteriorly. Pt able to sit HOB with total A of 2 persons ~6min, until HR elevated from 117 to 135, and then lowered back to bed for positioning.  Pt required total A of 2 persons for bed mobility and PROM.  Pt on pathway for post acute care at moderate level of intensity vs return to prior facility. "     Rehab Prognosis:  Fair; patient would benefit from acute skilled OT services to address these deficits and reach maximum level of function.       Plan:     Patient to be seen 3 x/week to address the above listed problems via self-care/home management, therapeutic activities, therapeutic exercises  Plan of Care Expires: 05/22/24  Plan of Care Reviewed with: patient    Subjective     Chief Complaint: No complaints  Patient/Family Comments/goals: Get better  Pain/Comfort:  Pain Rating 1:  not quantified, but grimaced/moaned with some bilateral PROM shoulder flexion which decreased to pt's comfort level  Location - Side 1: Bilateral  Location 1: shoulder  Pain Addressed 1: Reposition, Distraction  Pain Rating Post-Intervention 1: 0/10    Objective:     Communicated with: RN prior to session.  Patient found supine with telemetry, PEG Tube, Tracheostomy upon OT entry to room.    General Precautions: Standard, aphasia, contact, NPO    Orthopedic Precautions:N/A  Braces: N/A  Respiratory Status:  Trach Collar, 5L, 21%     Occupational Performance:     Bed Mobility:    Patient completed Rolling/Turning to Left with  total assistance and 2 persons  Patient completed Rolling/Turning to Right with total assistance and 2 persons  Patient completed Scooting/Bridging   To EOB while sitting with total A of 2 persons  To HOB while supine with total A of 2 persons  Patient completed Supine to Sit with total assistance and 2 persons  Patient completed Sit to Supine with total assistance and 2 persons   Pt able to sit EOB ~6min with total A of 2 persons for posterior/bilateral lean     Functional Mobility/Transfers:  Not observed this date    Activities of Daily Living:  Upper Body Dressing: total assistance affixing gown to maximize coverage    Therapeutic Exercises:   PROM of BUE across all horizontal planes      [facility] 6 Click ADL: 6    Treatment & Education:  Pt educated on role of OT, POC, and goals for therapy.    POC was  dicussed with patient/caregiver, who was included in its development and is in agreement with the identified goals and treatment plan.   Patient and family aware of patient's deficits and therapy progression.   Time provided for therapeutic counseling and discussion of health disposition.   Pt completed ADLs and functional mobility for treatment session as noted above   Updated communication board with level of assist required      Patient left HOB elevated with all lines intact, call button in reach, bed alarm on, and RN notified    GOALS:   Multidisciplinary Problems       Occupational Therapy Goals          Problem: Occupational Therapy    Goal Priority Disciplines Outcome Interventions   Occupational Therapy Goal     OT, PT/OT Progressing    Description: Goals to be met by: 5/22/24     Patient will increase functional independence with ADLs by performing:    UE Dressing with Maximum Assistance.  Grooming while EOB with Maximum Assistance.  Sitting at edge of bed x5 minutes with Maximum Assistance.  Rolling to Bilateral with Moderate Assistance.   Supine to sit with Maximum Assistance.                         Time Tracking:     OT Date of Treatment: 04/23/24  OT Start Time: 1110  OT Stop Time: 1136  OT Total Time (min): 26 min    Billable Minutes:Therapeutic Activity 26    OT/JOSÉ: OT          4/23/2024

## 2024-04-23 NOTE — PT/OT/SLP PROGRESS
Physical Therapy Treatment/Co-Treatment with OT.    Patient Name:  Sarah Saravia   MRN:  [id number]    Recommendations:     Discharge Recommendations: Moderate Intensity Therapy  Discharge Equipment Recommendations:  (TBD at next level of care)  Barriers to discharge: Inaccessible home and Decreased caregiver support    Assessment:     Sarah Saravia is a 53 y.o. female admitted with a medical diagnosis of Acute cystitis with hematuria.  She presents with the following impairments/functional limitations: weakness, impaired endurance, impaired self care skills, impaired functional mobility, impaired balance, impaired cognition, decreased upper extremity function, decreased lower extremity function, decreased safety awareness, abnormal tone, decreased ROM, impaired coordination, impaired fine motor, edema, impaired cardiopulmonary response to activity . Patient required total assistance to sit at EOB, due to severe lateral lean to the L, probably cause by muscle tone. Heart rate increased from 115 bpm to 135 bpm sitting at EOB.    Rehab Prognosis: Fair; patient would benefit from acute skilled PT services to address these deficits and reach maximum level of function.    Recent Surgery: * No surgery found *      Plan:     During this hospitalization, patient to be seen 3 x/week to address the identified rehab impairments via therapeutic activities, therapeutic exercises, neuromuscular re-education and progress toward the following goals:    Plan of Care Expires:  05/22/24    Subjective     Chief Complaint: N/A, Aphasic  Patient/Family Comments/goals: N/A, Aphasic  Pain/Comfort:  Pain Rating 1:  (Unable to rate, but dicomfort noted with limbs ROM)  Pain Rating Post-Intervention 1:  (patient appared calm and drowsy.)      Objective:     Communicated with NSG prior to session.  Patient found HOB elevated with telemetry, pulse ox (continuous), Tracheostomy, PEG Tube upon PT entry to room.     General Precautions:  Standard, contact, fall, NPO, diabetic, aphasia  Orthopedic Precautions: N/A  Braces: N/A  Respiratory Status: Room air     Functional Mobility:  Bed Mobility:     Rolling Left:  total assistance  Rolling Right: total assistance  Scooting: dependence and of 2 persons  Supine to Sit: total assistance and of 2 persons  Sit to Supine: total assistance and of 2 persons  Balance: poor- in sitting      AM-PAC 6 CLICK MOBILITY  Turning over in bed (including adjusting bedclothes, sheets and blankets)?: 2  Sitting down on and standing up from a chair with arms (e.g., wheelchair, bedside commode, etc.): 1  Moving from lying on back to sitting on the side of the bed?: 2  Moving to and from a bed to a chair (including a wheelchair)?: 1  Need to walk in hospital room?: 1  Climbing 3-5 steps with a railing?: 1  Basic Mobility Total Score: 8       Treatment & Education:  Co-treatment performed with O.T. Patient sat at EOB for ~6.5 minutes with total assistance due to lateral lean to the L. Patient transferred back to bed, as hear rate increased to 135 bpm.  B LE PROM x 20 reps on all available planes of motion. Elevated B heels on a pillow to prevent Decubitus.    Patient left HOB elevated with all lines intact and call button in reach..    GOALS:   Multidisciplinary Problems       Physical Therapy Goals          Problem: Physical Therapy    Goal Priority Disciplines Outcome Goal Variances Interventions   Physical Therapy Goal     PT, PT/OT Progressing     Description: Goals to be met by: 24     Patient will increase functional independence with mobility by performin. Supine to sit with Maximum Assistance  2. Sit to supine with Maximum Assistance  3. Rolling to Left and Right with Moderate Assistance.  4. Sitting at edge of bed 5 minutes with Moderate Assistance  5. Lower extremity exercise program x20 reps per handout, with assistance as needed                         Time Tracking:     PT Received On: 24  PT  Start Time: 1108     PT Stop Time: 1135  PT Total Time (min): 27 min     Billable Minutes: Neuromuscular Re-education 27    Treatment Type: Treatment  PT/PTA: PTA     Number of PTA visits since last PT visit: 1 04/23/2024

## 2024-04-23 NOTE — PLAN OF CARE
[facility] - Telemetry Stepdown  Discharge Reassessment    Primary Care Provider: No, Primary Doctor    Expected Discharge Date: 4/26/2024    Reassessment (most recent)       Discharge Reassessment - 04/23/24 1353          Discharge Reassessment    Assessment Type Discharge Planning Reassessment     Did the patient's condition or plan change since previous assessment? No     Discharge Plan discussed with: Patient;Adult children     Communicated ZEKE with patient/caregiver Yes     Discharge Plan A Skilled Nursing Facility     Discharge Plan B Home Health     DME Needed Upon Discharge  other (see comments)   TBD    Transition of Care Barriers None     Why the patient remains in the hospital Requires continued medical care        Post-Acute Status    Post-Acute Authorization Placement     Post-Acute Placement Status Referrals Sent                   Discharge Plan A and Plan B have been determined by review of patient's clinical status, future medical and therapeutic needs, and coverage/benefits for post-acute care in coordination with multidisciplinary team members.     Sara Loredo RN  Ext [phone]

## 2024-04-23 NOTE — CLINICAL REVIEW
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    Last VS: /71 (BP Location: Left arm, Patient Position: Lying)   Pulse (!) 116   Temp 99.4 °F (37.4 °C) (Oral)   Resp 18   Ht 5' 7" (1.702 m)   Wt 122.5 kg (270 lb 1 oz)   SpO2 98%   BMI 42.30 kg/m²     24H Vital Sign Range:  Temp:  [97.4 °F (36.3 °C)-99.4 °F (37.4 °C)]   Pulse:  []   Resp:  [16-20]   BP: (101-151)/(54-89)   SpO2:  [98 %-100 %]     Level of Consciousness (AVPU): responds to voice    Recent Labs     04/22/24  0725   WBC 9.07   HGB 10.2*   HCT 33.4*          Recent Labs     04/21/24  0522 04/22/24  0943 04/23/24  0444   * 132* 131*   K 3.6 4.1 4.0   CL 92* 95 96   CO2 23 24 22*   BUN 47* 66* 33*   CREATININE 5.2* 6.0* 4.4*   * 146* 153*   PHOS 4.4 5.2* 3.6   MG 2.5  --  2.3          OXYGEN:  Flow (L/min) (Oxygen Therapy): 5  Oxygen Concentration (%): 21       MEWS score: 4    Charge Cheikh BARBER contacted for tachycardia reports patient consistently with HR in the 110s, BP stable. No additional concerns verbalized at this time. Instructed to call [phone] for further concerns or assistance.    Dora Gunderson RN        "

## 2024-04-23 NOTE — PLAN OF CARE
Patient non verbal,VSS , spontaneously breathing with humidified room air with trachy tube. Safety measures ensured.call light within reach.bed in low position. No distress at night.

## 2024-04-23 NOTE — ASSESSMENT & PLAN NOTE
Patient's FSGs are controlled on current medication regimen.  Last A1c reviewed-   Lab Results   Component Value Date    HGBA1C 10.4 (H) 01/30/2024     Most recent fingerstick glucose reviewed-   Recent Labs   Lab 04/23/24  0606 04/23/24  0807 04/23/24  0922 04/23/24  1206   POCTGLUCOSE 166* 167* 182* 186*       Current correctional scale  Medium  Maintain anti-hyperglycemic dose as follows-   Antihyperglycemics (From admission, onward)    Start     Stop Route Frequency Ordered    04/17/24 1200  insulin aspart U-100 pen 4 Units         -- SubQ Every 6 hours 04/17/24 0938    04/17/24 0944  insulin aspart U-100 pen 0-10 Units         -- SubQ Every 6 hours PRN 04/17/24 0938    04/13/24 2100  insulin detemir U-100 (Levemir) pen 27 Units         -- SubQ 2 times daily 04/13/24 0931        Hold Oral hypoglycemics while patient is in the hospital.  Aspart 3u q4h  Levemir 27u BID  Anticipate discharge with above regimen given well controlled blood glucose while at goal TF rate  MDSSI  POCT glucose q4h, please give remaining sliding scale requirement if above the scheduled 3u  Ordered current regimen with elevation in glucose

## 2024-04-23 NOTE — SUBJECTIVE & OBJECTIVE
Interval History: NAEON, stable for dc pending placement      Objective:     Vital Signs (Most Recent):  Temp: 99.4 °F (37.4 °C) (04/23/24 0741)  Pulse: 106 (04/23/24 1205)  Resp: 20 (04/23/24 1205)  BP: 119/74 (04/23/24 1205)  SpO2: 100 % (04/23/24 1205) Vital Signs (24h Range):  Temp:  [97.4 °F (36.3 °C)-99.4 °F (37.4 °C)] 99.4 °F (37.4 °C)  Pulse:  [106-117] 106  Resp:  [16-20] 20  SpO2:  [98 %-100 %] 100 %  BP: (119-133)/(71-89) 119/74     Weight: 122.5 kg (270 lb 1 oz)  Body mass index is 42.3 kg/m².  No intake or output data in the 24 hours ending 04/23/24 1216      Physical Exam  Vitals and nursing note reviewed.   Constitutional:       General: She is not in acute distress.     Appearance: She is not ill-appearing, toxic-appearing or diaphoretic.      Comments: Trach in place   HENT:      Head: Normocephalic and atraumatic.      Right Ear: External ear normal.      Left Ear: External ear normal.      Mouth/Throat:      Mouth: Mucous membranes are moist.      Pharynx: No oropharyngeal exudate.   Eyes:      General: No scleral icterus.     Extraocular Movements: Extraocular movements intact.      Pupils: Pupils are equal, round, and reactive to light.   Cardiovascular:      Rate and Rhythm: Normal rate and regular rhythm.      Pulses: Normal pulses.      Heart sounds: Normal heart sounds. No murmur heard.  Pulmonary:      Effort: Pulmonary effort is normal. No respiratory distress.      Breath sounds: Normal breath sounds. No wheezing or rales.   Abdominal:      General: Abdomen is flat. Bowel sounds are normal. There is no distension.      Palpations: Abdomen is soft. There is no mass.      Tenderness: There is no abdominal tenderness.      Comments: Peg tube in place   Genitourinary:     Comments: Has large bandage over groin area. Pictures in chart  Musculoskeletal:         General: No swelling or tenderness. Normal range of motion.      Cervical back: Normal range of motion and neck supple.      Right  lower leg: No edema.      Left lower leg: No edema.      Comments: Moves BUE spontaneously   Skin:     General: Skin is warm and dry.      Capillary Refill: Capillary refill takes less than 2 seconds.      Findings: Wound present.   Neurological:      General: No focal deficit present.      Mental Status: She is alert and oriented to person, place, and time. Mental status is at baseline.      Comments: Nonverbal   Psychiatric:         Mood and Affect: Mood normal.         Behavior: Behavior normal.      Comments: Nonverbal  intermittently following commands             Significant Labs: All pertinent labs within the past 24 hours have been reviewed.    Significant Imaging: I have reviewed all pertinent imaging results/findings within the past 24 hours.

## 2024-04-23 NOTE — PLAN OF CARE
Spoke with patient's daughter Maik ([phone]) to review the discharge recommendation of SNF and she is agreeable to the plan    Patient/family provided list of facilities in-network with patient's payor plan. Providers that are owned, operated, or affiliated with [facility] are included on the list.     Notified that referral sent to below listed facilities from in-network list based on proximity to home/family support:   1.[facility]  2. [facility]  3. [facility]    Patient/family instructed to identify preference.    Preferred Facility: (if more than 1, listed in order of descending preference)  1.[facility]    If an additional preferred facility not listed above is identified, additional referral to be sent. If above facilities unable to accept, will send additional referrals to in-network providers.      Sara Loredo RN  Ext [phone]

## 2024-04-23 NOTE — PLAN OF CARE
Problem: Infection  Goal: Absence of Infection Signs and Symptoms  Outcome: Ongoing, Progressing  Intervention: Prevent or Manage Infection  Flowsheets (Taken 4/22/2024 1909)  Fever Reduction/Comfort Measures: lightweight bedding     Problem: Adult Inpatient Plan of Care  Goal: Plan of Care Review  Outcome: Ongoing, Progressing     Problem: Hemodynamic Instability (Hemodialysis)  Goal: Effective Tissue Perfusion  Outcome: Ongoing, Progressing  Intervention: Optimize Blood Flow  Flowsheets (Taken 4/22/2024 1909)  Stabilization Measures: airway opened     Problem: Diabetes Comorbidity  Goal: Blood Glucose Level Within Targeted Range  Outcome: Ongoing, Progressing     Problem: Glycemic Control Impaired (Sepsis/Septic Shock)  Goal: Blood Glucose Level Within Desired Range  Outcome: Ongoing, Progressing     Problem: Fall Injury Risk  Goal: Absence of Fall and Fall-Related Injury  Outcome: Ongoing, Progressing  Intervention: Identify and Manage Contributors  Flowsheets (Taken 4/22/2024 1909)  Medication Review/Management: medications reviewed

## 2024-04-23 NOTE — RESPIRATORY THERAPY
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  Clinically Significant Surgical Hx: tracheostomy    24 Hours Vitals Range:  Temp:  [97.4 °F (36.3 °C)-99.4 °F (37.4 °C)]   Pulse:  [106-117]   Resp:  [16-20]   BP: (119-133)/(71-89)   SpO2:  [98 %-100 %]     Labs:    Recent Labs     24  0522 24  0943 24  0444   * 132* 131*   K 3.6 4.1 4.0   CL 92* 95 96   CO2 23 24 22*   BUN 47* 66* 33*   CREATININE 5.2* 6.0* 4.4*   * 146* 153*   PHOS 4.4 5.2* 3.6   MG 2.5  --  2.3        No results for input(s): "PH", "PCO2", "PO2", "HCO3", "POCSATURATED", "BE" in the last 72 hours.    ASSESSMENT/INTERVENTIONS  Supplies at bedside      Last VS   Temp: 99.4 °F (37.4 °C) ( 0741)  Pulse: 106 ( 1205)  Resp: 20 ( 1205)  BP: 119/74 ( 1205)  SpO2: 100 % ( 1205)      Extra trachs at bedside:   Level of Consciousness: Level of Consciousness (AVPU): responds to voice  Respiratory Effort: Respiratory Effort: Unlabored, Normal Expansion/Accessory Muscle Usage: Expansion/Accessory Muscles/Retractions: no use of accessory muscles, no retractions, expansion symmetric  All Lung Field Breath Sounds: All Lung Fields Breath Sounds: Anterior:, Lateral:, diminished  JOSE Breath Sounds: diminished  LLL Breath Sounds: diminished  RUL Breath Sounds: diminished  RML Breath Sounds: diminished  RLL Breath Sounds: diminished  O2 Device/Concentration: 5l 21%  Surgical airway: Yes, Type: Shiley Size: 6, uncuffed  Ambu at bedside:       Active Orders   Respiratory Care    Oxygen Continuous     Frequency: Continuous     Number of Occurrences: Until Specified     " Order Questions:      Device type: Low flow      Device: Trach Collar      FiO2%: 28%      Titrate O2 per Oxygen Titration Protocol: Yes      To maintain SpO2 goal of: >= 90%      Notify MD of: Inability to achieve desired SpO2; Sudden change in patient status and requires 20% increase in FiO2; Patient requires >60% FiO2    Pulse Oximetry Continuous     Frequency: Continuous     Number of Occurrences: Until Specified    Routine tracheostomy care     Frequency: BID     Number of Occurrences: Until Specified       RECOMMENDATIONS    We recommend: RRT Recs: Continue POC per primary team.      FOLLOW-UP    Please call back the Rapid Response RT, Kathy Hall, RRT at x [phone] for any questions or concerns.

## 2024-04-23 NOTE — PROGRESS NOTES
[facility] - Telemetry [facility] Medicine  Progress Note    Patient Name: Sarah Saravia  MRN: [id number]  Patient Class: IP- Inpatient   Admission Date: 4/10/2024  Length of Stay: 12 days  Attending Physician: Janett Jean MD  Primary Care Provider: Deanna, Primary Doctor        Subjective:     Principal Problem:Acute cystitis with hematuria        HPI:  53 yof with 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 History was given from patient's daughter. She was undergoing dialysis today and noticed she was lethargic, less alert than usual self. Pt completed dialysis and still not acting herself. EMS was called, fever of a 100.  On chart review, she did have an episode of large volume emesis around 1700.  Per EMS, she had a slightly elevated temp 100.0°F, glucose 300s.     In the ED: UA 2+ leuks, >100 WBC, many bacteria, WBC 17, CT abd/pelvis concerning for cystitis. Given vanc/zosyn    Overview/Hospital Course:  Patient was admitted to Hospital Medicine service for medical management and evaluation of urosepsis. Patient was continued on vanc/zosyn. Vascular Neurology consulted concerning mental status change with the recommendation to initiate ASA 81 QD and to obtain an MRI to r/o new stroke. Imaging pending. Nephrology was consulted for regularly scheduled dialysis. Afternoon of 4/12, patient was unable to tolerate filtration of volume during dialsis 2/2 hypotension. Patient received 500cc bolus and was transferred back to floor after finishing the session without volume removed. Will monitor for signs of volume overload. Tailoring insulin regimen while uptitrating tube feeds to goal rate. Staph Epi in all 4 bottles; ID with recommendation to continue Vanc & de-escalate meropenem to ertapenem on 04/15 through 4/16. Patient completed IV course of UTI coverage. Patient tolerated volume removal well in dialysis  throughout the rest of her hospital stay. Pending discharge to LTACH pending bed availability. Patient without BM with one episode of vomiting overnight 4/17. KUB with bowel gas and low concern for obstruction with no transition point noted. Escalating bowel regimen. Pending placement as of 4/22.     Interval History: FREDDYEON, stable for dc pending placement      Objective:     Vital Signs (Most Recent):  Temp: 99.4 °F (37.4 °C) (04/23/24 0741)  Pulse: 106 (04/23/24 1205)  Resp: 20 (04/23/24 1205)  BP: 119/74 (04/23/24 1205)  SpO2: 100 % (04/23/24 1205) Vital Signs (24h Range):  Temp:  [97.4 °F (36.3 °C)-99.4 °F (37.4 °C)] 99.4 °F (37.4 °C)  Pulse:  [106-117] 106  Resp:  [16-20] 20  SpO2:  [98 %-100 %] 100 %  BP: (119-133)/(71-89) 119/74     Weight: 122.5 kg (270 lb 1 oz)  Body mass index is 42.3 kg/m².  No intake or output data in the 24 hours ending 04/23/24 1216      Physical Exam  Vitals and nursing note reviewed.   Constitutional:       General: She is not in acute distress.     Appearance: She is not ill-appearing, toxic-appearing or diaphoretic.      Comments: Trach in place   HENT:      Head: Normocephalic and atraumatic.      Right Ear: External ear normal.      Left Ear: External ear normal.      Mouth/Throat:      Mouth: Mucous membranes are moist.      Pharynx: No oropharyngeal exudate.   Eyes:      General: No scleral icterus.     Extraocular Movements: Extraocular movements intact.      Pupils: Pupils are equal, round, and reactive to light.   Cardiovascular:      Rate and Rhythm: Normal rate and regular rhythm.      Pulses: Normal pulses.      Heart sounds: Normal heart sounds. No murmur heard.  Pulmonary:      Effort: Pulmonary effort is normal. No respiratory distress.      Breath sounds: Normal breath sounds. No wheezing or rales.   Abdominal:      General: Abdomen is flat. Bowel sounds are normal. There is no distension.      Palpations: Abdomen is soft. There is no mass.      Tenderness: There is  no abdominal tenderness.      Comments: Peg tube in place   Genitourinary:     Comments: Has large bandage over groin area. Pictures in chart  Musculoskeletal:         General: No swelling or tenderness. Normal range of motion.      Cervical back: Normal range of motion and neck supple.      Right lower leg: No edema.      Left lower leg: No edema.      Comments: Moves BUE spontaneously   Skin:     General: Skin is warm and dry.      Capillary Refill: Capillary refill takes less than 2 seconds.      Findings: Wound present.   Neurological:      General: No focal deficit present.      Mental Status: She is alert and oriented to person, place, and time. Mental status is at baseline.      Comments: Nonverbal   Psychiatric:         Mood and Affect: Mood normal.         Behavior: Behavior normal.      Comments: Nonverbal  intermittently following commands             Significant Labs: All pertinent labs within the past 24 hours have been reviewed.    Significant Imaging: I have reviewed all pertinent imaging results/findings within the past 24 hours.    Assessment/Plan:      * Acute cystitis with hematuria  Pt presenting with AMS and worsening lethargy. Pt is non-verbal at baseline, does not follow commands, but was less responsive than normal. Pt found to have a fever. Urinary source suspected based off of urine and CT abd/pelvis. Pt has many prior resistant bacterial infections including urinary sources. Has prior with sensitivity to zosyn and has also improved off ED dose of vanc/zosyn. Lactic elevated a 3.8->3.49 prior to completion of fluid bolus 500ml    Plan  S/p vanc/merrem, now transitioned to vanc/ertapenem (on 04/15) per ID. Course completed 4/16.  Daily cbc  Contact precautions    Constipation  Patient without BM x5d. One episode of vomitus night of 4/17. Some concern for bowel obstruction. Tolerating continuous tube feeds at goal rate with 0cc on residuals. Physical exam with bowel sounds, soft nontender  abdomen. KUB without concern for obstruction with bowel gas, lack of transition point.    Patient now with regular bowel movements on bowel regimen.     Plan  - Miralax BID, Senna BID  - One time mag citrate per PEG ordered  - compazine PRN  - please contact MD team on call with concern for worsening nausea or abdominal pain.    Moderate malnutrition  Nutrition consulted. Most recent weight and BMI monitored-     Measurements:  Wt Readings from Last 1 Encounters:   04/18/24 122.5 kg (270 lb 1 oz)   Body mass index is 42.3 kg/m².    Patient has been screened and assessed by RD.    Malnutrition Type:  Context: acute illness or injury  Level: moderate    Malnutrition Characteristic Summary:  Weight Loss (Malnutrition): 10% in 6 months  Subcutaneous Fat (Malnutrition): mild depletion  Muscle Mass (Malnutrition): mild depletion  Fluid Accumulation (Malnutrition): mild    Interventions/Recommendations (treatment strategy):  1.      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 Physical therapy services has not been scheduled. Continue all standard measures for pressure injury prevention and consult wound care for any wounds (chronic or acute).    ESRD (end stage renal disease) on dialysis  Creatine stable for now. BMP reviewed- noted Estimated Creatinine Clearance: 20.1 mL/min (A) (based on SCr of 4.4 mg/dL (H)). according to latest data. Based on current GFR, CKD stage is end stage.  Monitor UOP and serial BMP and adjust therapy as needed. Renally dose meds. Avoid nephrotoxic medications and procedures.    -- HD MWF  -- nephro following  -- sevelamer TID    Acute encephalopathy  Pt is non-verbal and does not follow commands at baseline.  Vascular Neurology consulted given acute change from baseline. MRI with concern for evolution of prior strokes with noted differential of T2 hyperintensities including vasculitis vs demyelination. Discussed with Vascular Neurology; low concern for ongoing vasculitis/demyelination, likely represents typical evolution of prior infarcts.    Patient at baseline as of 4/22.     Plan  - STAT head imaging for concern of new change in mental status/focal deficit    Type 2 diabetes mellitus with hyperglycemia, with long-term current use of insulin  Patient's FSGs are controlled on current medication regimen.  Last A1c reviewed-   Lab Results   Component Value Date    HGBA1C 10.4 (H) 01/30/2024     Most recent fingerstick glucose reviewed-   Recent Labs   Lab 04/23/24  0606 04/23/24  0807 04/23/24  0922 04/23/24  1206   POCTGLUCOSE 166* 167* 182* 186*       Current correctional scale  Medium  Maintain anti-hyperglycemic dose as follows-   Antihyperglycemics (From admission, onward)      Start     Stop Route Frequency Ordered    04/17/24 1200  insulin aspart U-100 pen 4 Units         -- SubQ Every 6 hours 04/17/24 0938    04/17/24 0944  insulin aspart U-100 pen 0-10 Units         -- SubQ Every 6 hours PRN 04/17/24 0938    04/13/24 2100  insulin detemir U-100 (Levemir) pen 27 Units         -- SubQ 2 times daily 04/13/24 0931          Hold Oral hypoglycemics while patient is in the hospital.  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 Pt underwent extensive course, currently still healing from this, but no longer has wound vac. Pt's wound currently covered with bandage. Picture in media  tabs    Plan  Wound care        VTE Risk Mitigation (From admission, onward)           Ordered     heparin (porcine) injection 3,000 Units  As needed (PRN)         04/17/24 0825     heparin (porcine) injection 1,000 Units  As needed (PRN)         04/12/24 0951     heparin (porcine) injection 7,500 Units  Every 8 hours         04/11/24 0252                    Discharge Planning   ZEKE: 4/24/2024     Code Status: Full Code   Is the patient medically ready for discharge?: No    Reason for patient still in hospital (select all that apply): Patient trending condition  Discharge Plan A: Long-term acute care facility (LTAC)                  Osito Dos Santos MD  Department of Hospital Medicine   [facility] - Telemetry Stepdown

## 2024-04-23 NOTE — ASSESSMENT & PLAN NOTE
Creatine stable for now. BMP reviewed- noted Estimated Creatinine Clearance: 20.1 mL/min (A) (based on SCr of 4.4 mg/dL (H)). according to latest data. Based on current GFR, CKD stage is end stage.  Monitor UOP and serial BMP and adjust therapy as needed. Renally dose meds. Avoid nephrotoxic medications and procedures.    -- HD MWF  -- nephro following  -- sevelamer TID

## 2024-04-23 NOTE — PLAN OF CARE
Problem: Skin Injury Risk Increased  Goal: Skin Health and Integrity  Outcome: Progressing  Intervention: Optimize Skin Protection  Flowsheets (Taken 4/23/2024 1758)  Activity Management:   Arm raise - L1   Rolling - L1  Head of Bed (HOB) Positioning: HOB elevated     Problem: Adult Inpatient Plan of Care  Goal: Plan of Care Review  Outcome: Progressing  Flowsheets (Taken 4/23/2024 1758)  Plan of Care Reviewed With: patient     Problem: Infection (Hemodialysis)  Goal: Absence of Infection Signs and Symptoms  Outcome: Progressing     Problem: Glycemic Control Impaired (Sepsis/Septic Shock)  Goal: Blood Glucose Level Within Desired Range  Outcome: Progressing     Problem: Electrolyte Imbalance (Chronic Kidney Disease)  Goal: Electrolyte Balance  Outcome: Progressing  Intervention: Monitor and Manage Electrolyte Imbalance  Flowsheets (Taken 4/23/2024 1758)  Fluid/Electrolyte Management: fluids provided     Problem: Pain (Chronic Kidney Disease)  Goal: Acceptable Pain Control  Outcome: Progressing     Problem: Renal Function Impairment (Chronic Kidney Disease)  Goal: Minimize Renal Failure Effects  Outcome: Progressing  Intervention: Monitor and Support Renal Function  Flowsheets (Taken 4/23/2024 1758)  Medication Review/Management: medications reviewed     Patient remains free from falls and injuries through out shift. Patient  VSS. Tube feeds maintained as ordered. No events this shift.

## 2024-04-23 NOTE — PLAN OF CARE
Pt engaged fairly in therapy this date.     Problem: Occupational Therapy  Goal: Occupational Therapy Goal  Description: Goals to be met by: 5/22/24     Patient will increase functional independence with ADLs by performing:    UE Dressing with Maximum Assistance.  Grooming while EOB with Maximum Assistance.  Sitting at edge of bed x5 minutes with Maximum Assistance.  Rolling to Bilateral with Moderate Assistance.   Supine to sit with Maximum Assistance.    Outcome: Progressing

## 2024-04-24 LAB
ALBUMIN SERPL BCP-MCNC: 3.1 G/DL (ref 3.5–5.2)
ANION GAP SERPL CALC-SCNC: 15 MMOL/L (ref 8–16)
BASOPHILS # BLD AUTO: 0.05 K/UL (ref 0–0.2)
BASOPHILS NFR BLD: 0.5 % (ref 0–1.9)
BUN SERPL-MCNC: 54 MG/DL (ref 6–20)
CALCIUM SERPL-MCNC: 10.8 MG/DL (ref 8.7–10.5)
CHLORIDE SERPL-SCNC: 95 MMOL/L (ref 95–110)
CO2 SERPL-SCNC: 22 MMOL/L (ref 23–29)
CREAT SERPL-MCNC: 5.9 MG/DL (ref 0.5–1.4)
DIFFERENTIAL METHOD BLD: ABNORMAL
EOSINOPHIL # BLD AUTO: 0.6 K/UL (ref 0–0.5)
EOSINOPHIL NFR BLD: 6.1 % (ref 0–8)
ERYTHROCYTE [DISTWIDTH] IN BLOOD BY AUTOMATED COUNT: 15.1 % (ref 11.5–14.5)
EST. GFR  (NO RACE VARIABLE): 8 ML/MIN/1.73 M^2
GLUCOSE SERPL-MCNC: 174 MG/DL (ref 70–110)
HCT VFR BLD AUTO: 31.6 % (ref 37–48.5)
HGB BLD-MCNC: 9.5 G/DL (ref 12–16)
IMM GRANULOCYTES # BLD AUTO: 0.05 K/UL (ref 0–0.04)
IMM GRANULOCYTES NFR BLD AUTO: 0.5 % (ref 0–0.5)
LYMPHOCYTES # BLD AUTO: 2.5 K/UL (ref 1–4.8)
LYMPHOCYTES NFR BLD: 27.3 % (ref 18–48)
MCH RBC QN AUTO: 27.3 PG (ref 27–31)
MCHC RBC AUTO-ENTMCNC: 30.1 G/DL (ref 32–36)
MCV RBC AUTO: 91 FL (ref 82–98)
MONOCYTES # BLD AUTO: 1.3 K/UL (ref 0.3–1)
MONOCYTES NFR BLD: 14.6 % (ref 4–15)
NEUTROPHILS # BLD AUTO: 4.7 K/UL (ref 1.8–7.7)
NEUTROPHILS NFR BLD: 51 % (ref 38–73)
NRBC BLD-RTO: 0 /100 WBC
PHOSPHATE SERPL-MCNC: 5.3 MG/DL (ref 2.7–4.5)
PLATELET # BLD AUTO: 300 K/UL (ref 150–450)
PMV BLD AUTO: 9.8 FL (ref 9.2–12.9)
POCT GLUCOSE: 160 MG/DL (ref 70–110)
POCT GLUCOSE: 176 MG/DL (ref 70–110)
POCT GLUCOSE: 181 MG/DL (ref 70–110)
POCT GLUCOSE: 187 MG/DL (ref 70–110)
POCT GLUCOSE: 197 MG/DL (ref 70–110)
POTASSIUM SERPL-SCNC: 4.6 MMOL/L (ref 3.5–5.1)
RBC # BLD AUTO: 3.48 M/UL (ref 4–5.4)
SODIUM SERPL-SCNC: 132 MMOL/L (ref 136–145)
WBC # BLD AUTO: 9.18 K/UL (ref 3.9–12.7)

## 2024-04-24 PROCEDURE — 27000207 HC ISOLATION

## 2024-04-24 PROCEDURE — 25000003 PHARM REV CODE 250: Performed by: INTERNAL MEDICINE

## 2024-04-24 PROCEDURE — 94761 N-INVAS EAR/PLS OXIMETRY MLT: CPT

## 2024-04-24 PROCEDURE — 80069 RENAL FUNCTION PANEL: CPT | Performed by: HOSPITALIST

## 2024-04-24 PROCEDURE — 99900026 HC AIRWAY MAINTENANCE (STAT)

## 2024-04-24 PROCEDURE — 25000003 PHARM REV CODE 250

## 2024-04-24 PROCEDURE — 20600001 HC STEP DOWN PRIVATE ROOM

## 2024-04-24 PROCEDURE — 25000003 PHARM REV CODE 250: Performed by: NURSE PRACTITIONER

## 2024-04-24 PROCEDURE — 63600175 PHARM REV CODE 636 W HCPCS

## 2024-04-24 PROCEDURE — 25000003 PHARM REV CODE 250: Performed by: STUDENT IN AN ORGANIZED HEALTH CARE EDUCATION/TRAINING PROGRAM

## 2024-04-24 PROCEDURE — 36415 COLL VENOUS BLD VENIPUNCTURE: CPT

## 2024-04-24 PROCEDURE — 97530 THERAPEUTIC ACTIVITIES: CPT

## 2024-04-24 PROCEDURE — 85025 COMPLETE CBC W/AUTO DIFF WBC: CPT

## 2024-04-24 PROCEDURE — 99900035 HC TECH TIME PER 15 MIN (STAT)

## 2024-04-24 PROCEDURE — 63600175 PHARM REV CODE 636 W HCPCS: Performed by: NURSE PRACTITIONER

## 2024-04-24 PROCEDURE — 27000221 HC OXYGEN, UP TO 24 HOURS

## 2024-04-24 PROCEDURE — 25000242 PHARM REV CODE 250 ALT 637 W/ HCPCS

## 2024-04-24 PROCEDURE — 63600175 PHARM REV CODE 636 W HCPCS: Mod: JW | Performed by: NURSE PRACTITIONER

## 2024-04-24 PROCEDURE — 63600175 PHARM REV CODE 636 W HCPCS: Performed by: STUDENT IN AN ORGANIZED HEALTH CARE EDUCATION/TRAINING PROGRAM

## 2024-04-24 PROCEDURE — 80100014 HC HEMODIALYSIS 1:1

## 2024-04-24 PROCEDURE — 63600175 PHARM REV CODE 636 W HCPCS: Mod: JZ,JG | Performed by: NURSE PRACTITIONER

## 2024-04-24 RX ORDER — ATORVASTATIN CALCIUM 40 MG/1
40 TABLET, FILM COATED ORAL DAILY
Status: DISCONTINUED | OUTPATIENT
Start: 2024-04-24 | End: 2024-07-08 | Stop reason: HOSPADM

## 2024-04-24 RX ADMIN — HEPARIN SODIUM 3000 UNITS: 1000 INJECTION, SOLUTION INTRAVENOUS; SUBCUTANEOUS at 08:04

## 2024-04-24 RX ADMIN — DOCUSATE SODIUM AND SENNOSIDES 1 TABLET: 8.6; 5 TABLET, FILM COATED ORAL at 11:04

## 2024-04-24 RX ADMIN — HEPARIN SODIUM 7500 UNITS: 5000 INJECTION INTRAVENOUS; SUBCUTANEOUS at 01:04

## 2024-04-24 RX ADMIN — PSYLLIUM HUSK 1 PACKET: 3.4 POWDER ORAL at 11:04

## 2024-04-24 RX ADMIN — INSULIN ASPART 4 UNITS: 100 INJECTION, SOLUTION INTRAVENOUS; SUBCUTANEOUS at 01:04

## 2024-04-24 RX ADMIN — Medication 500 MG: at 11:04

## 2024-04-24 RX ADMIN — HEPARIN SODIUM 7500 UNITS: 5000 INJECTION INTRAVENOUS; SUBCUTANEOUS at 05:04

## 2024-04-24 RX ADMIN — INSULIN ASPART 4 UNITS: 100 INJECTION, SOLUTION INTRAVENOUS; SUBCUTANEOUS at 11:04

## 2024-04-24 RX ADMIN — POLYETHYLENE GLYCOL 3350 17 G: 17 POWDER, FOR SOLUTION ORAL at 11:04

## 2024-04-24 RX ADMIN — PANTOPRAZOLE SODIUM 40 MG: 40 GRANULE, DELAYED RELEASE ORAL at 11:04

## 2024-04-24 RX ADMIN — INSULIN DETEMIR 27 UNITS: 100 INJECTION, SOLUTION SUBCUTANEOUS at 12:04

## 2024-04-24 RX ADMIN — INSULIN ASPART 4 UNITS: 100 INJECTION, SOLUTION INTRAVENOUS; SUBCUTANEOUS at 12:04

## 2024-04-24 RX ADMIN — ERYTHROPOIETIN 6100 UNITS: 10000 INJECTION, SOLUTION INTRAVENOUS; SUBCUTANEOUS at 11:04

## 2024-04-24 RX ADMIN — ZINC SULFATE 220 MG (50 MG) CAPSULE 220 MG: CAPSULE at 11:04

## 2024-04-24 RX ADMIN — SODIUM CHLORIDE: 9 INJECTION, SOLUTION INTRAVENOUS at 07:04

## 2024-04-24 RX ADMIN — ASPIRIN 81 MG CHEWABLE TABLET 81 MG: 81 TABLET CHEWABLE at 11:04

## 2024-04-24 RX ADMIN — ALTEPLASE 4 MG: 2.2 INJECTION, POWDER, LYOPHILIZED, FOR SOLUTION INTRAVENOUS at 11:04

## 2024-04-24 RX ADMIN — Medication 500 MG: at 09:04

## 2024-04-24 RX ADMIN — HEPARIN SODIUM 1000 UNITS: 1000 INJECTION, SOLUTION INTRAVENOUS; SUBCUTANEOUS at 08:04

## 2024-04-24 RX ADMIN — ATORVASTATIN CALCIUM 40 MG: 40 TABLET, FILM COATED ORAL at 01:04

## 2024-04-24 RX ADMIN — HEPARIN SODIUM 7500 UNITS: 5000 INJECTION INTRAVENOUS; SUBCUTANEOUS at 09:04

## 2024-04-24 RX ADMIN — INSULIN DETEMIR 27 UNITS: 100 INJECTION, SOLUTION SUBCUTANEOUS at 09:04

## 2024-04-24 RX ADMIN — INSULIN ASPART 4 UNITS: 100 INJECTION, SOLUTION INTRAVENOUS; SUBCUTANEOUS at 06:04

## 2024-04-24 RX ADMIN — SEVELAMER CARBONATE 0.8 G: 2400 POWDER, FOR SUSPENSION ORAL at 11:04

## 2024-04-24 RX ADMIN — POLYETHYLENE GLYCOL 3350 17 G: 17 POWDER, FOR SOLUTION ORAL at 09:04

## 2024-04-24 RX ADMIN — DOCUSATE SODIUM AND SENNOSIDES 1 TABLET: 8.6; 5 TABLET, FILM COATED ORAL at 09:04

## 2024-04-24 RX ADMIN — INSULIN ASPART 4 UNITS: 100 INJECTION, SOLUTION INTRAVENOUS; SUBCUTANEOUS at 05:04

## 2024-04-24 RX ADMIN — SEVELAMER CARBONATE 0.8 G: 2400 POWDER, FOR SUSPENSION ORAL at 09:04

## 2024-04-24 NOTE — NURSING
Nurses Note -- 4 Eyes      4/24/2024   6:43 AM      Skin assessed during: Q Shift Change      [] No Altered Skin Integrity Present    []Prevention Measures Documented      [x] Yes- Altered Skin Integrity Present or Discovered   [x] LDA Added if Not in Epic (Describe Wound)   [] New Altered Skin Integrity was Present on Admit and Documented in LDA   [] Wound Image Taken    Wound Care Consulted? No    Attending Nurse:  Samina Eastman RN/Staff Member:  Naina BARBER

## 2024-04-24 NOTE — PLAN OF CARE
Problem: Infection  Goal: Absence of Infection Signs and Symptoms  Outcome: Progressing     Problem: Skin Injury Risk Increased  Goal: Skin Health and Integrity  Outcome: Progressing     Problem: Adult Inpatient Plan of Care  Goal: Plan of Care Review  Outcome: Progressing  Goal: Absence of Hospital-Acquired Illness or Injury  Outcome: Progressing  Intervention: Identify and Manage Fall Risk  Flowsheets (Taken 4/24/2024 8002)  Safety Promotion/Fall Prevention:   assistive device/personal item within reach   bed alarm set   side rails raised x 2  Goal: Optimal Comfort and Wellbeing  Outcome: Not Progressing  Intervention: Monitor Pain and Promote Comfort  Flowsheets (Taken 4/24/2024 4172)  Pain Management Interventions:   pillow support provided   position adjusted  Goal: Readiness for Transition of Care  Outcome: Progressing

## 2024-04-24 NOTE — CARE UPDATE
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    Last VS: /62   Pulse (!) 119   Temp 98.5 °F (36.9 °C) (Oral)   Resp (!) 28   Ht 5' 7" (1.702 m)   Wt 122.5 kg (270 lb 1 oz)   SpO2 99%   BMI 42.30 kg/m²     24H Vital Sign Range:  Temp:  [98.5 °F (36.9 °C)-99.8 °F (37.7 °C)]   Pulse:  [110-126]   Resp:  [18-28]   BP: (100-143)/(57-87)   SpO2:  [95 %-100 %]     Level of Consciousness (AVPU): alert    Recent Labs     04/22/24  0725 04/24/24  0428   WBC 9.07 9.18   HGB 10.2* 9.5*   HCT 33.4* 31.6*    300       Recent Labs     04/22/24  0943 04/23/24  0444 04/24/24  0801   * 131* 132*   K 4.1 4.0 4.6   CL 95 96 95   CO2 24 22* 22*   BUN 66* 33* 54*   CREATININE 6.0* 4.4* 5.9*   * 153* 174*   PHOS 5.2* 3.6 5.3*   MG  --  2.3  --         No results for input(s): "PH", "PCO2", "PO2", "HCO3", "POCSATURATED", "BE" in the last 72 hours.     OXYGEN:  Flow (L/min) (Oxygen Therapy): 5  Oxygen Concentration (%): 21       MEWS score: 4    Rounding completed w/ charge ELISABETH Noyola. Discussed persistent tachycardia. Pt's HR at baseline 120s. Plan for dialysis today.  No additional concerns verbalized at this time. Instructed to call [phone] for further concerns or assistance.    Monique Munson RN       "

## 2024-04-24 NOTE — PLAN OF CARE
Nurses Note -- 4 Eyes      4/24/2024   2:24 PM      Skin assessed during: Daily Assessment      [] No Altered Skin Integrity Present    []Prevention Measures Documented      [x] Yes- Altered Skin Integrity Present or Discovered   [x] LDA Added if Not in Epic (Describe Wound)   [] New Altered Skin Integrity was Present on Admit and Documented in LDA   [] Wound Image Taken    Wound Care Consulted? No    Attending Nurse:  Larissa Eastman RN/Staff Member:  ELISABETH De La Vega

## 2024-04-24 NOTE — PT/OT/SLP PROGRESS
Physical Therapy      Patient Name:  Sarah Saravia   MRN:  [id number]    Patient not seen today secondary to patient in dialysis at first attempt, PT unable to return this date  . Will follow-up as able.

## 2024-04-24 NOTE — PT/OT/SLP PROGRESS
Occupational Therapy   Treatment    Name: Sarah Saravia  MRN: [id number]  Admitting Diagnosis:  Acute cystitis with hematuria       Recommendations:     Discharge Recommendations: Moderate Intensity Therapy  Discharge Equipment Recommendations:  to be determined by next level of care  Barriers to discharge:   (current functional level)    Assessment:   Progressing towards goals.  Continued recommendation of post acute Moderate Intensity therapy.  To benefit from continued acute care OT services to increase independence in self-care/functional transfers.  Continue POC.     Sarah Saravia is a 53 y.o. female with a medical diagnosis of Acute cystitis with hematuria.  She presents with below deficits decreasing independence in self-care/functional transfers. Performance deficits affecting function are weakness, impaired endurance, impaired self care skills, impaired functional mobility, gait instability, impaired balance, impaired cognition, decreased coordination, decreased upper extremity function, decreased lower extremity function, decreased safety awareness, pain, decreased ROM, impaired coordination, impaired fine motor, impaired skin, impaired cardiopulmonary response to activity.     Rehab Prognosis:  Good; patient would benefit from acute skilled OT services to address these deficits and reach maximum level of function.       Plan:     Patient to be seen 3 x/week to address the above listed problems via self-care/home management, therapeutic activities, therapeutic exercises, neuromuscular re-education  Plan of Care Expires: 05/22/24  Plan of Care Reviewed with: patient    Subjective     Chief Complaint: Pt. With impaired communication.  Facial grimace noted with initiation of bed mobility.   Patient/Family Comments/goals: Pt. With impaired communication.   Pain/Comfort:  Pain Rating 1:  (facial grimace noted with initiating bed mobility)  Location - Orientation 1: generalized  Pain Addressed 1: Distraction,  Cessation of Activity, Nurse notified    Objective:     Communicated with: RN prior to session.  Patient found R-side lying with peripheral IV, PEG Tube, telemetry, pulse ox (continuous), Tracheostomy and RN present upon OT entry to room.    General Precautions: Standard, fall, contact, NPO, aphasia    Orthopedic Precautions:N/A  Braces: N/A  Respiratory Status: Room air     Occupational Performance:     Bed Mobility:    TOTAL A of 2 persons for supine<>sit.  Tolerating static sitting X 4-minutes with HOHA for safe hand placement and MAX A for maintaining balance with posterior trunk lean noted. TOTAL A of 2 persons with bed in trendelenburg for scooting towards HOB while supine via draw sheet method.  TOTAL A for L-side lying for wedge positioning to keep with turning schedule.        Activities of Daily Living:  TOTAL A to don socks while at bed level.        [facility] 6 Click ADL: 6    Treatment & Education:  TOTAL A of 2 persons for supine<>sit.  Tolerating static sitting X 4-minutes with HOHA for safe hand placement and MAX A for maintaining balance with posterior trunk lean noted. TOTAL A of 2 persons with bed in trendelenburg for scooting towards HOB while supine via draw sheet method.  TOTAL A for L-side lying for wedge positioning to keep with turning schedule.    TOTAL A to don socks while at bed level.    Pt. With eyes opening and blinking and randomly making eye contact occasionally though not making eye contact when her name is called or in response to any questions; facial grimace noted when moving BLE to initiate bed mobility activity though no facial grimacing for remainder of activity.   Educated on role of OT and received call light review though no evidence of learning.      Patient left right sidelying with all lines intact, call button in reach, and nursing notified    GOALS:   Multidisciplinary Problems       Occupational Therapy Goals          Problem: Occupational Therapy    Goal Priority  Disciplines Outcome Interventions   Occupational Therapy Goal     OT, PT/OT Progressing    Description: Goals to be met by: 5/22/24     Patient will increase functional independence with ADLs by performing:    UE Dressing with Maximum Assistance.  Grooming while EOB with Maximum Assistance.  Sitting at edge of bed x5 minutes with Maximum Assistance.  Rolling to Bilateral with Moderate Assistance.   Supine to sit with Maximum Assistance.                         Time Tracking:     OT Date of Treatment: 04/24/24  OT Start Time: 1247  OT Stop Time: 1304  OT Total Time (min): 17 min    Billable Minutes:Therapeutic Activity 17    OT/JOSÉ: OT          4/24/2024

## 2024-04-24 NOTE — PLAN OF CARE
Patient nonverbal. Trach. PEG tube with continuous feeding. Bmx1 on shift. Generalized weakness, repositioned as tolerated.     Problem: Infection  Goal: Absence of Infection Signs and Symptoms  Outcome: Progressing     Problem: Skin Injury Risk Increased  Goal: Skin Health and Integrity  Outcome: Progressing     Problem: Adult Inpatient Plan of Care  Goal: Plan of Care Review  Outcome: Progressing  Goal: Patient-Specific Goal (Individualized)  Outcome: Progressing  Goal: Absence of Hospital-Acquired Illness or Injury  Outcome: Progressing  Goal: Optimal Comfort and Wellbeing  Outcome: Progressing  Goal: Readiness for Transition of Care  Outcome: Progressing     Problem: Bariatric Environmental Safety  Goal: Safety Maintained with Care  Outcome: Progressing     Problem: Device-Related Complication Risk (Hemodialysis)  Goal: Safe, Effective Therapy Delivery  Outcome: Progressing     Problem: Hemodynamic Instability (Hemodialysis)  Goal: Effective Tissue Perfusion  Outcome: Progressing     Problem: Infection (Hemodialysis)  Goal: Absence of Infection Signs and Symptoms  Outcome: Progressing     Problem: Diabetes Comorbidity  Goal: Blood Glucose Level Within Targeted Range  Outcome: Progressing     Problem: Adjustment to Illness (Sepsis/Septic Shock)  Goal: Optimal Coping  Outcome: Progressing     Problem: Bleeding (Sepsis/Septic Shock)  Goal: Absence of Bleeding  Outcome: Progressing     Problem: Glycemic Control Impaired (Sepsis/Septic Shock)  Goal: Blood Glucose Level Within Desired Range  Outcome: Progressing     Problem: Infection Progression (Sepsis/Septic Shock)  Goal: Absence of Infection Signs and Symptoms  Outcome: Progressing     Problem: Nutrition Impaired (Sepsis/Septic Shock)  Goal: Optimal Nutrition Intake  Outcome: Progressing     Problem: Fall Injury Risk  Goal: Absence of Fall and Fall-Related Injury  Outcome: Progressing     Problem: Adjustment to Illness (Chronic Kidney Disease)  Goal: Optimal  Coping with Chronic Illness  Outcome: Progressing     Problem: Electrolyte Imbalance (Chronic Kidney Disease)  Goal: Electrolyte Balance  Outcome: Progressing     Problem: Fluid Volume Excess (Chronic Kidney Disease)  Goal: Fluid Balance  Outcome: Progressing     Problem: Functional Decline (Chronic Kidney Disease)  Goal: Optimal Functional Ability  Outcome: Progressing     Problem: Hematologic Alteration (Chronic Kidney Disease)  Goal: Absence of Anemia Signs and Symptoms  Outcome: Progressing     Problem: Oral Intake Inadequate (Chronic Kidney Disease)  Goal: Optimal Oral Intake  Outcome: Progressing     Problem: Pain (Chronic Kidney Disease)  Goal: Acceptable Pain Control  Outcome: Progressing     Problem: Renal Function Impairment (Chronic Kidney Disease)  Goal: Minimize Renal Failure Effects  Outcome: Progressing

## 2024-04-24 NOTE — PLAN OF CARE
Call placed to patient's daughter Maik ([phone] ) to discuss her mother's discharge plan. This CM explained that the only dialysis unit that will be able to accommodate her mother's needs is [facility]. Ms. Duran expressed understanding but still has concerns about her mother discharging to [facility]. She asked that an MD please call her as she has questions about how long her mother may need the trach and if it can be removed. IM4 Team notified of above conversation. Will continue to follow.    Sara Loredo RN  Ext [phone]

## 2024-04-24 NOTE — SUBJECTIVE & OBJECTIVE
Interval History: NAEON. Pending placement      Objective:     Vital Signs (Most Recent):  Temp: 99.6 °F (37.6 °C) (04/24/24 0447)  Pulse: (!) 121 (04/24/24 0741)  Resp: (!) 28 (04/24/24 0454)  BP: (!) 113/57 (04/24/24 0741)  SpO2: 100 % (04/24/24 0733) Vital Signs (24h Range):  Temp:  [99.3 °F (37.4 °C)-99.8 °F (37.7 °C)] 99.6 °F (37.6 °C)  Pulse:  [106-124] 121  Resp:  [18-28] 28  SpO2:  [95 %-100 %] 100 %  BP: (110-143)/(57-87) 113/57     Weight: 122.5 kg (270 lb 1 oz)  Body mass index is 42.3 kg/m².  No intake or output data in the 24 hours ending 04/24/24 0752      Physical Exam  Vitals and nursing note reviewed.   Constitutional:       General: She is not in acute distress.     Appearance: She is not ill-appearing, toxic-appearing or diaphoretic.      Comments: Trach in place   HENT:      Head: Normocephalic and atraumatic.      Right Ear: External ear normal.      Left Ear: External ear normal.      Mouth/Throat:      Mouth: Mucous membranes are moist.      Pharynx: No oropharyngeal exudate.   Eyes:      General: No scleral icterus.     Extraocular Movements: Extraocular movements intact.      Pupils: Pupils are equal, round, and reactive to light.   Cardiovascular:      Rate and Rhythm: Normal rate and regular rhythm.      Pulses: Normal pulses.      Heart sounds: Normal heart sounds. No murmur heard.  Pulmonary:      Effort: Pulmonary effort is normal. No respiratory distress.      Breath sounds: Normal breath sounds. No wheezing or rales.   Abdominal:      General: Abdomen is flat. Bowel sounds are normal. There is no distension.      Palpations: Abdomen is soft. There is no mass.      Tenderness: There is no abdominal tenderness.      Comments: Peg tube in place   Genitourinary:     Comments: Has large bandage over groin area. Pictures in chart  Musculoskeletal:         General: No swelling or tenderness. Normal range of motion.      Cervical back: Normal range of motion and neck supple.      Right lower  leg: No edema.      Left lower leg: No edema.      Comments: Moves BUE spontaneously   Skin:     General: Skin is warm and dry.      Capillary Refill: Capillary refill takes less than 2 seconds.      Findings: Wound present.   Neurological:      General: No focal deficit present.      Mental Status: She is alert and oriented to person, place, and time. Mental status is at baseline.      Comments: Nonverbal   Psychiatric:         Mood and Affect: Mood normal.         Behavior: Behavior normal.      Comments: Nonverbal  intermittently following commands             Significant Labs: All pertinent labs within the past 24 hours have been reviewed.    Significant Imaging: I have reviewed all pertinent imaging results/findings within the past 24 hours.

## 2024-04-24 NOTE — ASSESSMENT & PLAN NOTE
Patient's FSGs are controlled on current medication regimen.  Last A1c reviewed-   Lab Results   Component Value Date    HGBA1C 10.4 (H) 01/30/2024     Most recent fingerstick glucose reviewed-   Recent Labs   Lab 04/23/24  1703 04/23/24  1954 04/23/24  2351 04/24/24  0521   POCTGLUCOSE 162* 116* 142* 181*       Current correctional scale  Medium  Maintain anti-hyperglycemic dose as follows-   Antihyperglycemics (From admission, onward)    Start     Stop Route Frequency Ordered    04/17/24 1200  insulin aspart U-100 pen 4 Units         -- SubQ Every 6 hours 04/17/24 0938    04/17/24 0944  insulin aspart U-100 pen 0-10 Units         -- SubQ Every 6 hours PRN 04/17/24 0938    04/13/24 2100  insulin detemir U-100 (Levemir) pen 27 Units         -- SubQ 2 times daily 04/13/24 0931        Hold Oral hypoglycemics while patient is in the hospital.  Aspart 3u q4h  Levemir 27u BID  Anticipate discharge with above regimen given well controlled blood glucose while at goal TF rate  MDSSI  POCT glucose q4h, please give remaining sliding scale requirement if above the scheduled 3u  Ordered current regimen with elevation in glucose

## 2024-04-24 NOTE — PROGRESS NOTES
[facility] NOTE     Patient currently on hemodialysis for removal of uremic toxins .     Patient seen and evaluated on hemodialysis, tolerating treatment, see HD flowsheet for vitals and assessments.      No Hypotension, chest pain, shortness of breath, cramping, nausea or vomiting.     Target UF: 1.5 L as tolerated, keep MAP >65  Continue EPO  Continue Sevelamer   Encouraged patient to limit fluid intake to 32 oz per day.   No lab stick/BP intake on access site  Continue to monitor intake and output, daily weights   Please avoid gadolinium, fleets, phos-based laxatives, NSAIDs  Will follow closely and continue dialysis treatments while in-patient    SHERLY Gregory DNP, APRN, FNP-C  Department of Nephrology  [facility]  Pager: [phone]

## 2024-04-24 NOTE — PROGRESS NOTES
Dialysis started to [facility].  Tolerated well.    Contact isolation precautions maintained.

## 2024-04-24 NOTE — PLAN OF CARE
Problem: Device-Related Complication Risk (Hemodialysis)  Goal: Safe, Effective Therapy Delivery  Outcome: Progressing     Problem: Hemodynamic Instability (Hemodialysis)  Goal: Effective Tissue Perfusion  Outcome: Progressing     Problem: Infection (Hemodialysis)  Goal: Absence of Infection Signs and Symptoms  Outcome: Progressing

## 2024-04-24 NOTE — PROGRESS NOTES
3.5 hour dialysis complete.  Blood returned.    [facility] flushed, LOCKED WITH CATHFLO, capped, taped, medication label applied and wrapped in gauze.    Net UF 2L.  Epo given.  Tolerated well.    Transported from [facility] to room 8092 via bed by transporter.

## 2024-04-24 NOTE — RESPIRATORY THERAPY
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  Clinically Significant Surgical Hx: tracheostomy    24 Hours Vitals Range:  Temp:  [98.5 °F (36.9 °C)-99.8 °F (37.7 °C)]   Pulse:  [110-126]   Resp:  [18-28]   BP: (100-144)/(57-87)   SpO2:  [95 %-100 %]     Labs:    Recent Labs     24  0943 24  0444 24  0801   * 131* 132*   K 4.1 4.0 4.6   CL 95 96 95   CO2 24 22* 22*   BUN 66* 33* 54*   CREATININE 6.0* 4.4* 5.9*   * 153* 174*   PHOS 5.2* 3.6 5.3*   MG  --  2.3  --         No results for input(s): "PH", "PCO2", "PO2", "HCO3", "POCSATURATED", "BE" in the last 72 hours.    ASSESSMENT/INTERVENTIONS  Pt not in room      Last VS   Temp: 99.6 °F (37.6 °C) ( 1558)  Pulse: 125 ( 1558)  Resp: 22 ( 1558)  BP: 144/85 ( 1558)  SpO2: 100 % ( 1558)      Extra trachs at bedside:   Level of Consciousness: Level of Consciousness (AVPU): alert  Respiratory Effort: Respiratory Effort: Normal, Unlabored Expansion/Accessory Muscle Usage: Expansion/Accessory Muscles/Retractions: no use of accessory muscles, no retractions, expansion symmetric  All Lung Field Breath Sounds: All Lung Fields Breath Sounds: Anterior:, Lateral:, diminished  JOSE Breath Sounds: diminished  LLL Breath Sounds: diminished  RUL Breath Sounds: diminished  RML Breath Sounds: diminished  RLL Breath Sounds: diminished  O2 Device/Concentration: n/a  Surgical airway: Yes, Type: Shiley Size: 6, uncuffed  Ambu at bedside:       Active Orders   Respiratory Care    Oxygen Continuous     Frequency: Continuous     Number of Occurrences: Until Specified     Order Questions:     "  Device type: Low flow      Device: Trach Collar      FiO2%: 28%      Titrate O2 per Oxygen Titration Protocol: Yes      To maintain SpO2 goal of: >= 90%      Notify MD of: Inability to achieve desired SpO2; Sudden change in patient status and requires 20% increase in FiO2; Patient requires >60% FiO2    Pulse Oximetry Continuous     Frequency: Continuous     Number of Occurrences: Until Specified    Routine tracheostomy care     Frequency: BID     Number of Occurrences: Until Specified       RECOMMENDATIONS    We recommend: RRT Recs: Continue POC per primary team.      FOLLOW-UP    Please call back the Rapid Response RT, Kathy Hall, RRT at x [phone] for any questions or concerns.

## 2024-04-24 NOTE — PROGRESS NOTES
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 History was given from patient's daughter. She was undergoing dialysis today and noticed she was lethargic, less alert than usual self. Pt completed dialysis and still not acting herself. EMS was called, fever of a 100.  On chart review, she did have an episode of large volume emesis around 1700.  Per EMS, she had a slightly elevated temp 100.0°F, glucose 300s.     In the ED: UA 2+ leuks, >100 WBC, many bacteria, WBC 17, CT abd/pelvis concerning for cystitis. Given vanc/zosyn    Overview/Hospital Course:  Patient was admitted to Hospital Medicine service for medical management and evaluation of urosepsis. Patient was continued on vanc/zosyn. Vascular Neurology consulted concerning mental status change with the recommendation to initiate ASA 81 QD and to obtain an MRI to r/o new stroke. Imaging pending. Nephrology was consulted for regularly scheduled dialysis. Afternoon of 4/12, patient was unable to tolerate filtration of volume during dialsis 2/2 hypotension. Patient received 500cc bolus and was transferred back to floor after finishing the session without volume removed. Will monitor for signs of volume overload. Tailoring insulin regimen while uptitrating tube feeds to goal rate. Staph Epi in all 4 bottles; ID with recommendation to continue Vanc & de-escalate meropenem to ertapenem on 04/15 through 4/16. Patient completed IV course of UTI coverage. Patient tolerated volume removal well in dialysis  throughout the rest of her hospital stay. Pending discharge to LTACH pending bed availability. Patient without BM with one episode of vomiting overnight 4/17. KUB with bowel gas and low concern for obstruction with no transition point noted. Escalating bowel regimen. Pending placement as of 4/22.     Interval History: NAEON. Pending placement      Objective:     Vital Signs (Most Recent):  Temp: 99.6 °F (37.6 °C) (04/24/24 0447)  Pulse: (!) 121 (04/24/24 0741)  Resp: (!) 28 (04/24/24 0454)  BP: (!) 113/57 (04/24/24 0741)  SpO2: 100 % (04/24/24 0733) Vital Signs (24h Range):  Temp:  [99.3 °F (37.4 °C)-99.8 °F (37.7 °C)] 99.6 °F (37.6 °C)  Pulse:  [106-124] 121  Resp:  [18-28] 28  SpO2:  [95 %-100 %] 100 %  BP: (110-143)/(57-87) 113/57     Weight: 122.5 kg (270 lb 1 oz)  Body mass index is 42.3 kg/m².  No intake or output data in the 24 hours ending 04/24/24 3464      Physical Exam  Vitals and nursing note reviewed.   Constitutional:       General: She is not in acute distress.     Appearance: She is not ill-appearing, toxic-appearing or diaphoretic.      Comments: Trach in place   HENT:      Head: Normocephalic and atraumatic.      Right Ear: External ear normal.      Left Ear: External ear normal.      Mouth/Throat:      Mouth: Mucous membranes are moist.      Pharynx: No oropharyngeal exudate.   Eyes:      General: No scleral icterus.     Extraocular Movements: Extraocular movements intact.      Pupils: Pupils are equal, round, and reactive to light.   Cardiovascular:      Rate and Rhythm: Normal rate and regular rhythm.      Pulses: Normal pulses.      Heart sounds: Normal heart sounds. No murmur heard.  Pulmonary:      Effort: Pulmonary effort is normal. No respiratory distress.      Breath sounds: Normal breath sounds. No wheezing or rales.   Abdominal:      General: Abdomen is flat. Bowel sounds are normal. There is no distension.      Palpations: Abdomen is soft. There is no mass.      Tenderness: There is no  abdominal tenderness.      Comments: Peg tube in place   Genitourinary:     Comments: Has large bandage over groin area. Pictures in chart  Musculoskeletal:         General: No swelling or tenderness. Normal range of motion.      Cervical back: Normal range of motion and neck supple.      Right lower leg: No edema.      Left lower leg: No edema.      Comments: Moves BUE spontaneously   Skin:     General: Skin is warm and dry.      Capillary Refill: Capillary refill takes less than 2 seconds.      Findings: Wound present.   Neurological:      General: No focal deficit present.      Mental Status: She is alert and oriented to person, place, and time. Mental status is at baseline.      Comments: Nonverbal   Psychiatric:         Mood and Affect: Mood normal.         Behavior: Behavior normal.      Comments: Nonverbal  intermittently following commands             Significant Labs: All pertinent labs within the past 24 hours have been reviewed.    Significant Imaging: I have reviewed all pertinent imaging results/findings within the past 24 hours.    Assessment/Plan:      * Acute cystitis with hematuria  Pt presenting with AMS and worsening lethargy. Pt is non-verbal at baseline, does not follow commands, but was less responsive than normal. Pt found to have a fever. Urinary source suspected based off of urine and CT abd/pelvis. Pt has many prior resistant bacterial infections including urinary sources. Has prior with sensitivity to zosyn and has also improved off ED dose of vanc/zosyn. Lactic elevated a 3.8->3.49 prior to completion of fluid bolus 500ml    Plan  S/p vanc/merrem, now transitioned to vanc/ertapenem (on 04/15) per ID. Course completed 4/16.  Daily cbc  Contact precautions    Constipation  Patient without BM x5d. One episode of vomitus night of 4/17. Some concern for bowel obstruction. Tolerating continuous tube feeds at goal rate with 0cc on residuals. Physical exam with bowel sounds, soft nontender  abdomen. KUB without concern for obstruction with bowel gas, lack of transition point.    Patient now with regular bowel movements on bowel regimen.     Plan  - Miralax BID, Senna BID  - One time mag citrate per PEG ordered  - compazine PRN  - please contact MD team on call with concern for worsening nausea or abdominal pain.    Moderate malnutrition  Nutrition consulted. Most recent weight and BMI monitored-     Measurements:  Wt Readings from Last 1 Encounters:   04/18/24 122.5 kg (270 lb 1 oz)   Body mass index is 42.3 kg/m².    Patient has been screened and assessed by RD.    Malnutrition Type:  Context: acute illness or injury  Level: moderate    Malnutrition Characteristic Summary:  Weight Loss (Malnutrition): 10% in 6 months  Subcutaneous Fat (Malnutrition): mild depletion  Muscle Mass (Malnutrition): mild depletion  Fluid Accumulation (Malnutrition): mild    Interventions/Recommendations (treatment strategy):  1.      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 Physical therapy services has not been scheduled. Continue all standard measures for pressure injury prevention and consult wound care for any wounds (chronic or acute).    ESRD (end stage renal disease) on dialysis  Creatine stable for now. BMP reviewed- noted Estimated Creatinine Clearance: 20.1 mL/min (A) (based on SCr of 4.4 mg/dL (H)). according to latest data. Based on current GFR, CKD stage is end stage.  Monitor UOP and serial BMP and adjust therapy as needed. Renally dose meds. Avoid nephrotoxic medications and procedures.    -- HD MWF  -- nephro following  -- sevelamer TID    Acute encephalopathy  Pt is non-verbal and does not follow commands at baseline.  Vascular Neurology consulted given acute change from baseline. MRI with concern for evolution of prior strokes with noted differential of T2 hyperintensities including vasculitis vs demyelination. Discussed with Vascular Neurology; low concern for ongoing vasculitis/demyelination, likely represents typical evolution of prior infarcts.    Patient at baseline as of 4/22.     Plan  - STAT head imaging for concern of new change in mental status/focal deficit    Type 2 diabetes mellitus with hyperglycemia, with long-term current use of insulin  Patient's FSGs are controlled on current medication regimen.  Last A1c reviewed-   Lab Results   Component Value Date    HGBA1C 10.4 (H) 01/30/2024     Most recent fingerstick glucose reviewed-   Recent Labs   Lab 04/23/24  1703 04/23/24  1954 04/23/24  2351 04/24/24  0521   POCTGLUCOSE 162* 116* 142* 181*       Current correctional scale  Medium  Maintain anti-hyperglycemic dose as follows-   Antihyperglycemics (From admission, onward)      Start     Stop Route Frequency Ordered    04/17/24 1200  insulin aspart U-100 pen 4 Units         -- SubQ Every 6 hours 04/17/24 0938    04/17/24 0944  insulin aspart U-100 pen 0-10 Units         -- SubQ Every 6 hours PRN 04/17/24 0938    04/13/24 2100  insulin detemir U-100 (Levemir) pen 27 Units         -- SubQ 2 times daily 04/13/24 0931          Hold Oral hypoglycemics while patient is in the hospital.  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 Pt underwent extensive course, currently still healing from this, but no longer has wound vac. Pt's wound currently covered with bandage. Picture in media  tabs    Plan  Wound care        VTE Risk Mitigation (From admission, onward)           Ordered     heparin (porcine) injection 3,000 Units  As needed (PRN)         04/17/24 0825     heparin (porcine) injection 1,000 Units  As needed (PRN)         04/12/24 0951     heparin (porcine) injection 7,500 Units  Every 8 hours         04/11/24 0252                    Discharge Planning   ZEKE: 4/26/2024     Code Status: Full Code   Is the patient medically ready for discharge?: No    Reason for patient still in hospital (select all that apply): Patient trending condition  Discharge Plan A: Skilled Nursing Facility                  Osito Dos Santos MD  Department of Hospital Medicine   [facility] - Telemetry Stepdown

## 2024-04-25 LAB
ALBUMIN SERPL BCP-MCNC: 3.2 G/DL (ref 3.5–5.2)
ALBUMIN SERPL BCP-MCNC: 3.2 G/DL (ref 3.5–5.2)
ALP SERPL-CCNC: 124 U/L (ref 55–135)
ALT SERPL W/O P-5'-P-CCNC: 28 U/L (ref 10–44)
ANION GAP SERPL CALC-SCNC: 15 MMOL/L (ref 8–16)
ANION GAP SERPL CALC-SCNC: 15 MMOL/L (ref 8–16)
AST SERPL-CCNC: 22 U/L (ref 10–40)
BILIRUB SERPL-MCNC: 0.3 MG/DL (ref 0.1–1)
BUN SERPL-MCNC: 48 MG/DL (ref 6–20)
BUN SERPL-MCNC: 48 MG/DL (ref 6–20)
CALCIUM SERPL-MCNC: 10.5 MG/DL (ref 8.7–10.5)
CALCIUM SERPL-MCNC: 10.5 MG/DL (ref 8.7–10.5)
CHLORIDE SERPL-SCNC: 97 MMOL/L (ref 95–110)
CHLORIDE SERPL-SCNC: 97 MMOL/L (ref 95–110)
CO2 SERPL-SCNC: 18 MMOL/L (ref 23–29)
CO2 SERPL-SCNC: 18 MMOL/L (ref 23–29)
CREAT SERPL-MCNC: 5.3 MG/DL (ref 0.5–1.4)
CREAT SERPL-MCNC: 5.3 MG/DL (ref 0.5–1.4)
EST. GFR  (NO RACE VARIABLE): 9.1 ML/MIN/1.73 M^2
EST. GFR  (NO RACE VARIABLE): 9.1 ML/MIN/1.73 M^2
GLUCOSE SERPL-MCNC: 185 MG/DL (ref 70–110)
GLUCOSE SERPL-MCNC: 185 MG/DL (ref 70–110)
MAGNESIUM SERPL-MCNC: 2.4 MG/DL (ref 1.6–2.6)
PHOSPHATE SERPL-MCNC: 4.6 MG/DL (ref 2.7–4.5)
POCT GLUCOSE: 124 MG/DL (ref 70–110)
POCT GLUCOSE: 183 MG/DL (ref 70–110)
POCT GLUCOSE: 203 MG/DL (ref 70–110)
POCT GLUCOSE: 211 MG/DL (ref 70–110)
POTASSIUM SERPL-SCNC: 4.5 MMOL/L (ref 3.5–5.1)
POTASSIUM SERPL-SCNC: 4.5 MMOL/L (ref 3.5–5.1)
PROT SERPL-MCNC: 8.8 G/DL (ref 6–8.4)
SODIUM SERPL-SCNC: 130 MMOL/L (ref 136–145)
SODIUM SERPL-SCNC: 130 MMOL/L (ref 136–145)

## 2024-04-25 PROCEDURE — 84100 ASSAY OF PHOSPHORUS: CPT | Performed by: HOSPITALIST

## 2024-04-25 PROCEDURE — 25000003 PHARM REV CODE 250

## 2024-04-25 PROCEDURE — 25000003 PHARM REV CODE 250: Performed by: STUDENT IN AN ORGANIZED HEALTH CARE EDUCATION/TRAINING PROGRAM

## 2024-04-25 PROCEDURE — 36415 COLL VENOUS BLD VENIPUNCTURE: CPT | Performed by: HOSPITALIST

## 2024-04-25 PROCEDURE — 99900035 HC TECH TIME PER 15 MIN (STAT)

## 2024-04-25 PROCEDURE — 83735 ASSAY OF MAGNESIUM: CPT | Performed by: HOSPITALIST

## 2024-04-25 PROCEDURE — 25000003 PHARM REV CODE 250: Performed by: INTERNAL MEDICINE

## 2024-04-25 PROCEDURE — 99900031 HC PATIENT EDUCATION (STAT)

## 2024-04-25 PROCEDURE — 99223 1ST HOSP IP/OBS HIGH 75: CPT | Mod: ,,, | Performed by: INTERNAL MEDICINE

## 2024-04-25 PROCEDURE — 27000207 HC ISOLATION

## 2024-04-25 PROCEDURE — 99900026 HC AIRWAY MAINTENANCE (STAT)

## 2024-04-25 PROCEDURE — 20600001 HC STEP DOWN PRIVATE ROOM

## 2024-04-25 PROCEDURE — 27000221 HC OXYGEN, UP TO 24 HOURS

## 2024-04-25 PROCEDURE — 94761 N-INVAS EAR/PLS OXIMETRY MLT: CPT

## 2024-04-25 PROCEDURE — 97530 THERAPEUTIC ACTIVITIES: CPT | Mod: CQ

## 2024-04-25 PROCEDURE — 63600175 PHARM REV CODE 636 W HCPCS

## 2024-04-25 PROCEDURE — 27200966 HC CLOSED SUCTION SYSTEM

## 2024-04-25 PROCEDURE — 25000242 PHARM REV CODE 250 ALT 637 W/ HCPCS

## 2024-04-25 PROCEDURE — 80053 COMPREHEN METABOLIC PANEL: CPT | Performed by: HOSPITALIST

## 2024-04-25 RX ORDER — SODIUM CHLORIDE 9 MG/ML
INJECTION, SOLUTION INTRAVENOUS ONCE
Status: COMPLETED | OUTPATIENT
Start: 2024-04-26 | End: 2024-04-26

## 2024-04-25 RX ADMIN — SODIUM CHLORIDE 1000 ML: 9 INJECTION, SOLUTION INTRAVENOUS at 02:04

## 2024-04-25 RX ADMIN — PANTOPRAZOLE SODIUM 40 MG: 40 GRANULE, DELAYED RELEASE ORAL at 08:04

## 2024-04-25 RX ADMIN — SEVELAMER CARBONATE 0.8 G: 2400 POWDER, FOR SUSPENSION ORAL at 09:04

## 2024-04-25 RX ADMIN — ASPIRIN 81 MG CHEWABLE TABLET 81 MG: 81 TABLET CHEWABLE at 08:04

## 2024-04-25 RX ADMIN — INSULIN ASPART 4 UNITS: 100 INJECTION, SOLUTION INTRAVENOUS; SUBCUTANEOUS at 05:04

## 2024-04-25 RX ADMIN — INSULIN DETEMIR 27 UNITS: 100 INJECTION, SOLUTION SUBCUTANEOUS at 08:04

## 2024-04-25 RX ADMIN — SEVELAMER CARBONATE 0.8 G: 2400 POWDER, FOR SUSPENSION ORAL at 08:04

## 2024-04-25 RX ADMIN — Medication 500 MG: at 09:04

## 2024-04-25 RX ADMIN — ZINC SULFATE 220 MG (50 MG) CAPSULE 220 MG: CAPSULE at 08:04

## 2024-04-25 RX ADMIN — HEPARIN SODIUM 7500 UNITS: 5000 INJECTION INTRAVENOUS; SUBCUTANEOUS at 09:04

## 2024-04-25 RX ADMIN — Medication 500 MG: at 08:04

## 2024-04-25 RX ADMIN — ATORVASTATIN CALCIUM 40 MG: 40 TABLET, FILM COATED ORAL at 08:04

## 2024-04-25 RX ADMIN — DOCUSATE SODIUM AND SENNOSIDES 1 TABLET: 8.6; 5 TABLET, FILM COATED ORAL at 09:04

## 2024-04-25 RX ADMIN — PSYLLIUM HUSK 1 PACKET: 3.4 POWDER ORAL at 08:04

## 2024-04-25 RX ADMIN — HEPARIN SODIUM 7500 UNITS: 5000 INJECTION INTRAVENOUS; SUBCUTANEOUS at 05:04

## 2024-04-25 RX ADMIN — INSULIN DETEMIR 27 UNITS: 100 INJECTION, SOLUTION SUBCUTANEOUS at 09:04

## 2024-04-25 RX ADMIN — DOCUSATE SODIUM AND SENNOSIDES 1 TABLET: 8.6; 5 TABLET, FILM COATED ORAL at 08:04

## 2024-04-25 RX ADMIN — SEVELAMER CARBONATE 0.8 G: 2400 POWDER, FOR SUSPENSION ORAL at 02:04

## 2024-04-25 RX ADMIN — INSULIN ASPART 4 UNITS: 100 INJECTION, SOLUTION INTRAVENOUS; SUBCUTANEOUS at 12:04

## 2024-04-25 RX ADMIN — POLYETHYLENE GLYCOL 3350 17 G: 17 POWDER, FOR SOLUTION ORAL at 08:04

## 2024-04-25 RX ADMIN — POLYETHYLENE GLYCOL 3350 17 G: 17 POWDER, FOR SOLUTION ORAL at 09:04

## 2024-04-25 RX ADMIN — HEPARIN SODIUM 7500 UNITS: 5000 INJECTION INTRAVENOUS; SUBCUTANEOUS at 02:04

## 2024-04-25 NOTE — PROGRESS NOTES
[facility] - Telemetry Stepdown  Adult Nutrition  Progress Note    SUMMARY       Recommendations    Continue TF regimen of Novasource @ 40 mL/hr - meeting needs.  - If bolus TFs warranted for discharge, rec'd Novasource - 4 cans/day = 1900 kcals, 88 g of protein, 672 mL fluid.   RD to monitor & follow-up.    Goals: Meet % EEN, EPN by RD f/u date  Nutrition Goal Status: goal met  Communication of RD Recs: reviewed with RN    Assessment and Plan    Moderate malnutrition    Malnutrition Type:  Context: acute illness or injury  Level: moderate    Related to (etiology):   Inability to consume sufficient energy     Signs and Symptoms (as evidenced by):   Weight loss, NFPE, Edema    Malnutrition Characteristic Summary:  Weight Loss (Malnutrition): 10% in 6 months  Subcutaneous Fat (Malnutrition): mild depletion  Muscle Mass (Malnutrition): mild depletion  Fluid Accumulation (Malnutrition): mild    Interventions/Recommendations (treatment strategy):  Collaboration of nutrition care w/ other providers  EN    Nutrition Diagnosis Status:   Continues     Malnutrition Assessment    Malnutrition Context: acute illness or injury  Malnutrition Level: moderate    Weight Loss (Malnutrition): 10% in 6 months  Subcutaneous Fat (Malnutrition): mild depletion  Muscle Mass (Malnutrition): mild depletion  Fluid Accumulation (Malnutrition): mild     Reason for Assessment    Reason For Assessment: RD follow-up  Diagnosis: other (see comments) (Acute cystitis with hematuria)  Relevant Medical History: CVA, PEG, DM  Interdisciplinary Rounds: did not attend    General Information Comments: Pt on trach collar (this AM), remains NPO - tolerating TFs via PEG. Pt received HD yesterday. Moderate malnutrition diagnosis continues; please see PES statement for details.  Nutrition Discharge Planning: Adequate nutrition    Nutrition/Diet History    Spiritual, Cultural Beliefs, Yarsani Practices, Values that Affect Care:  (None stated.)  Food  "Allergies: NKFA  Factors Affecting Nutritional Intake: NPO    Anthropometrics    Temp: 98.7 °F (37.1 °C)  Height Method: Stated  Height: 5' 7" (170.2 cm)  Height (inches): 67 in  Weight Method: Bed Scale  Weight: 122.5 kg (270 lb 1 oz)  Weight (lb): 270.07 lb  Ideal Body Weight (IBW), Female: 135 lb  % Ideal Body Weight, Female (lb): 200.05 %  BMI (Calculated): 42.3  BMI Grade: greater than 40 - morbid obesity  Usual Body Weight (UBW), kg: (!) 136.4 kg  % Usual Body Weight: 90  % Weight Change From Usual Weight: -10.19 %    Lab/Procedures/Meds    Pertinent Labs Reviewed: reviewed  Pertinent Labs Comments: Creat 5.9, GFR 8, P 5.3  Pertinent Medications Reviewed: reviewed  Pertinent Medications Comments: Psyllium husk    Estimated/Assessed Needs    Weight Used For Calorie Calculations: 122.5 kg (270 lb 1 oz)    Energy Calorie Requirements (kcal): 2048 kcal/d  Energy Need Method: Ajo-St Jeor (1.1 PAL)    Protein Requirements:  g/d (.8-1 g/kg)  Weight Used For Protein Calculations: 122.5 kg (270 lb 1 oz)    Estimated Fluid Requirement Method: other (see comments) (Per MD)  RDA Method (mL): 2048    CHO Requirement: 256g    Nutrition Prescription Ordered    Current Diet Order: NPO  Current Nutrition Support Formula Ordered: Novasource Renal  Current Nutrition Support Rate Ordered: 40 mL/hr    Evaluation of Received Nutrient/Fluid Intake    Enteral Calories (kcal): 1920  Enteral Protein (gm): 87  Enteral (Free Water) Fluid (mL): 688    % Kcal Needs: 94%  % Protein Needs: 90%    I/O: -1.6L since admit    Energy Calories Required: meeting needs  Protein Required: meeting needs  Fluid Required: other (see comments) (Per MD)    Comments: LBM: 4/24    Tolerance: tolerating    Nutrition Risk    Level of Risk/Frequency of Follow-up:  (1x/week)     Monitor and Evaluation    Food and Nutrient Intake: enteral nutrition intake  Food and Nutrient Adminstration: enteral and parenteral nutrition administration  Physical " Activity and Function: nutrition-related ADLs and IADLs  Anthropometric Measurements: weight, weight change  Biochemical Data, Medical Tests and Procedures: glucose/endocrine profile, lipid profile, inflammatory profile, gastrointestinal profile  Nutrition-Focused Physical Findings: overall appearance     Nutrition Follow-Up    RD Follow-up?: Yes

## 2024-04-25 NOTE — PT/OT/SLP PROGRESS
Physical Therapy Treatment    Patient Name:  Sarah Saravia   MRN:  [id number]    Recommendations:     Discharge Recommendations: Moderate Intensity Therapy  Discharge Equipment Recommendations: to be determined by next level of care  Barriers to discharge: Pt requiring increased skilled assistance at current time.     Assessment:     Sarah Saravia is a 53 y.o. female admitted with a medical diagnosis of Acute cystitis with hematuria.  She presents with the following impairments/functional limitations: weakness, impaired endurance, impaired sensation, impaired self care skills, impaired functional mobility, impaired balance, decreased coordination, decreased upper extremity function, decreased lower extremity function, decreased safety awareness, pain, impaired coordination requiring total assistance and verbal cues for bed mob, scooting to EOB/HOB, static sitting at the EOB  due to weakness, R post lean.   In light of pt's current functional level and deficits, it is anticipated that pt will need to participate in a moderate intensity rehab program consisting of PT and OT in order to achieve full rehab potential to return to previous level of function and roles.  Pt will cont to benefit from skilled PT intervention to address deficits and improve functional mobility.    Rehab Prognosis: Fair; patient would benefit from acute skilled PT services to address these deficits and reach maximum level of function.    Recent Surgery: * No surgery found *      Plan:     During this hospitalization, patient to be seen 3 x/week to address the identified rehab impairments via therapeutic activities, therapeutic exercises, neuromuscular re-education and progress toward the following goals:    Plan of Care Expires:  05/22/24    Subjective     Chief Complaint: pain  Pain/Comfort:  Pain Rating 1:  (no rating provided. Pt with facial grimmacing during mobility)  Location - Side 1: Bilateral  Location - Orientation 1:  generalized  Pain Addressed 1: Reposition, Distraction, Cessation of Activity  Pain Rating Post-Intervention 1:  (no rating provided)      Objective:     Communicated with nurse (Justa) prior to session.  Patient found  L sidelying with HOB at 35* angle  with PEG Tube, pressure relief boots, telemetry, Tracheostomy, oxygen (no family present) upon PT entry to room.     General Precautions: Standard, aphasia, aspiration, fall, NPO, contact (ESBL/MDRO in urine)  Orthopedic Precautions: N/A  Braces: N/A  Respiratory Status: High flow, flow 5 L/min, concentration 28%     Functional Mobility:  Bed Mobility:     Rolling Left:  total assistance and of 2 persons  Rolling Right: total assistance and of 2 persons  Scooting: anteriorly to the EOB with total assistance and of 2 persons; to HOB dependent of 2 with use of drawsheet  Supine to Sit: total assistance and of 2 persons, exiting on the L side, HOB at 30* angle, with use of drawsheet  Sit to Supine: total assistance and of 2 persons, HOB flat  Balance: static sitting at the EOB with max A for trunk then progressing to mod/min A with UE in weight bearing position x10 min.  Limited sitting time due to pt's HR increasing to high 130's while at the EOB.   Pt requires assistance with change of pads and hygiene once returned to sup due to soiled with BM      AM-PAC 6 CLICK MOBILITY  Turning over in bed (including adjusting bedclothes, sheets and blankets)?: 1  Sitting down on and standing up from a chair with arms (e.g., wheelchair, bedside commode, etc.): 1  Moving from lying on back to sitting on the side of the bed?: 1  Moving to and from a bed to a chair (including a wheelchair)?: 1  Need to walk in hospital room?: 1  Climbing 3-5 steps with a railing?: 1  Basic Mobility Total Score: 6       Treatment & Education:  Patient provided with daily orientation and goals of this PT session. They were educated to call for assistance and to transfer with hospital staff only.   Also, pt was educated on the effects of prolonged immobility and the importance of performing OOB activity and exercises to promote healing and reduce recovery time    Patient left supine with all lines intact, call button in reach, and nurse and PCT present..    GOALS:   Multidisciplinary Problems       Physical Therapy Goals          Problem: Physical Therapy    Goal Priority Disciplines Outcome Goal Variances Interventions   Physical Therapy Goal     PT, PT/OT Progressing     Description: Goals to be met by: 24     Patient will increase functional independence with mobility by performin. Supine to sit with Maximum Assistance  2. Sit to supine with Maximum Assistance  3. Rolling to Left and Right with Moderate Assistance.  4. Sitting at edge of bed 5 minutes with Moderate Assistance  5. Lower extremity exercise program x20 reps per handout, with assistance as needed                         Time Tracking:     PT Received On: 24  PT Start Time: 1120     PT Stop Time: 1205  PT Total Time (min): 45 min     Billable Minutes: Therapeutic Activity 45    Treatment Type: Treatment  PT/PTA: PTA     Number of PTA visits since last PT visit: 2     2024

## 2024-04-25 NOTE — ASSESSMENT & PLAN NOTE
Date Placed: 2/22/24  Size:  7.5 mm ID cuffless Shiley on 03/13  Secretion Assessment: Small, white, thick   Clearance Mechanisms  Suction and Frequency: q4hr  CPT: NA  Cough Assist: NA   Nebulizers: Duonebs prn  PMV Trial: Tolerating > 12 hrs during the day  Capping: START Trial today through the weekend  Trach Collar: currently tolerating ATC   Current Setting: ATC, FiO2 28%        Recommendations   - discussed with respiratory therapy, will start a capping trial this afternoon   - plan to continue capping for 48 hours   - if patient tolerates this, we will attempt a decannulation early next week

## 2024-04-25 NOTE — ASSESSMENT & PLAN NOTE
Patient's FSGs are controlled on current medication regimen.  Last A1c reviewed-   Lab Results   Component Value Date    HGBA1C 10.4 (H) 01/30/2024     Most recent fingerstick glucose reviewed-   Recent Labs   Lab 04/24/24  2310 04/25/24  0555 04/25/24  0821 04/25/24  1211   POCTGLUCOSE 176* 183* 203* 211*       Current correctional scale  Medium  Maintain anti-hyperglycemic dose as follows-   Antihyperglycemics (From admission, onward)    Start     Stop Route Frequency Ordered    04/17/24 1200  insulin aspart U-100 pen 4 Units         -- SubQ Every 6 hours 04/17/24 0938    04/17/24 0944  insulin aspart U-100 pen 0-10 Units         -- SubQ Every 6 hours PRN 04/17/24 0938    04/13/24 2100  insulin detemir U-100 (Levemir) pen 27 Units         -- SubQ 2 times daily 04/13/24 0931        Hold Oral hypoglycemics while patient is in the hospital.  Aspart 3u q4h  Levemir 27u BID  Anticipate discharge with above regimen given well controlled blood glucose while at goal TF rate  MDSSI  POCT glucose q4h, please give remaining sliding scale requirement if above the scheduled 3u  Ordered current regimen with elevation in glucose

## 2024-04-25 NOTE — CARE UPDATE
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    Last VS: /84 (BP Location: Right arm, Patient Position: Lying)   Pulse (!) 113   Temp 98.9 °F (37.2 °C) (Oral)   Resp 20   Ht 5' 7" (1.702 m)   Wt 122.5 kg (270 lb 1 oz)   SpO2 100%   BMI 42.30 kg/m²     24H Vital Sign Range:  Temp:  [98.5 °F (36.9 °C)-100.3 °F (37.9 °C)]   Pulse:  [112-125]   Resp:  [18-24]   BP: (100-144)/(57-85)   SpO2:  [95 %-100 %]     Level of Consciousness (AVPU): responds to voice    Recent Labs     04/24/24  0428   WBC 9.18   HGB 9.5*   HCT 31.6*          Recent Labs     04/22/24  0943 04/23/24  0444 04/24/24  0801   * 131* 132*   K 4.1 4.0 4.6   CL 95 96 95   CO2 24 22* 22*   BUN 66* 33* 54*   CREATININE 6.0* 4.4* 5.9*   * 153* 174*   PHOS 5.2* 3.6 5.3*   MG  --  2.3  --         No results for input(s): "PH", "PCO2", "PO2", "HCO3", "POCSATURATED", "BE" in the last 72 hours.     OXYGEN:  Flow (L/min) (Oxygen Therapy): 5  Oxygen Concentration (%): 28       MEWS score: 4    Rounding completed w/ charge ELISABETH Noyola.  Discussed persistent tachycardia. Pt's HR at baseline 120s. No additional concerns verbalized at this time. Instructed to call [phone] for further concerns or assistance.    Monique Munson RN       "

## 2024-04-25 NOTE — CONSULTS
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 She has been stabilized from an infection standpoint over the last few weeks and is now getting ready for discharge. Primary team is having difficulty with placement from a trach and dialysis need standpoint.     Pulmonology was consulted for assessment of decannulation.     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    Morbid obesity     Necrotizing fasciitis        Past Surgical History:   Procedure Laterality Date    CLOSURE OF WOUND Right 2/22/2024    Procedure: CLOSURE, WOUND;  Surgeon: Steve Cole MD;  Location: [address];  Service: General;  Laterality: Right;    ESOPHAGOGASTRODUODENOSCOPY W/ PEG N/A 2/22/2024    Procedure: EGD, WITH PEG TUBE INSERTION;  Surgeon: Steve Cole MD;  Location: [address];  Service: General;  Laterality: N/A;    INCISION AND DRAINAGE OF PERIRECTAL REGION N/A 1/29/2024    Procedure: INCISION AND DRAINAGE, PERIRECTAL REGION;  Surgeon: Axel Ramsay MD;  Location: [facility];  Service: General;  Laterality: N/A;    LUMBAR PUNCTURE N/A 2/16/2024    Procedure: Lumbar Puncture;  Surgeon: Macy Boykin;  Location: [facility];  Service: Anesthesiology;  Laterality: N/A;     PLACEMENT, TRIALYSIS CATH Right 1/31/2024    Procedure: INSERTION, CATHETER, TRIPLE LUMEN, HEMODIALYSIS, TEMPORARY;  Surgeon: Alvin Junior MD;  Location: [facility] OR;  Service: General;  Laterality: Right;    REPLACEMENT OF WOUND VACUUM-ASSISTED CLOSURE DEVICE Right 2/12/2024    Procedure: REPLACEMENT, WOUND VAC;  Surgeon: Mundo Carmona MD;  Location: [facility] OR [facility];  Service: General;  Laterality: Right;    REPLACEMENT OF WOUND VACUUM-ASSISTED CLOSURE DEVICE N/A 2/15/2024    Procedure: REPLACEMENT, WOUND VAC;  Surgeon: Steve Cole MD;  Location: [facility] OR [facility];  Service: General;  Laterality: N/A;    REPLACEMENT OF WOUND VACUUM-ASSISTED CLOSURE DEVICE Right 2/19/2024    Procedure: REPLACEMENT, WOUND VAC;  Surgeon: Steve Cole MD;  [facility];  Service: General;  Laterality: Right;  RLE/groin    REPLACEMENT OF WOUND VACUUM-ASSISTED CLOSURE DEVICE Right 2/22/2024    Procedure: REPLACEMENT, WOUND VAC;  Surgeon: Steve Cole MD;  [facility];  Service: General;  Laterality: Right;    TRACHEOSTOMY N/A 2/22/2024    Procedure: CREATION, TRACHEOSTOMY;  Surgeon: Steve Cole MD;  [facility];  Service: General;  Laterality: N/A;    WOUND DEBRIDEMENT Bilateral 2/2/2024    Procedure: DEBRIDEMENT, WOUND;  Surgeon: Steve Cole MD;  [facility];  Service: General;  Laterality: Bilateral;  Bilateral groin  Possible wound vac placement    WOUND DEBRIDEMENT Right 2/6/2024    Procedure: DEBRIDEMENT, WOUND, replace wound vac, possible closure;  Surgeon: Steve Cole MD;  [facility];  Service: General;  Laterality: Right;  RLE    WOUND DEBRIDEMENT Right 2/9/2024    Procedure: DEBRIDEMENT, WOUND w wound vac change;  Surgeon: Steve Cole MD;  [facility]: [facility];  Service: General;  Laterality: Right;  RLE    WOUND DEBRIDEMENT Right 2/12/2024    Procedure: R thigh wound debridement;  Surgeon: Mundo Carmona MD;  Location: [facility]  2ND FLR;  Service: General;  Laterality: Right;    WOUND EXPLORATION Right 1/31/2024    Procedure: IRRIGATION & DEBRIDEMENT, WOUND DEBRIDEMENT;  Surgeon: Alvin Junior MD;  Location: [facility] OR;  Service: General;  Laterality: Right;       Review of patient's allergies indicates:  No Known Allergies    Family History    None       Tobacco Use    Smoking status: Unknown    Smokeless tobacco: Not on file   Substance and Sexual Activity    Alcohol use: Not on file    Drug use: Not on file    Sexual activity: Not on file         Review of Systems   Unable to perform ROS: Patient nonverbal     Objective:     Vital Signs (Most Recent):  Temp: 98.9 °F (37.2 °C) (04/25/24 0800)  Pulse: (!) 114 (04/25/24 1118)  Resp: 20 (04/25/24 0800)  BP: 137/84 (04/25/24 0800)  SpO2: 100 % (04/25/24 0800) Vital Signs (24h Range):  Temp:  [98.5 °F (36.9 °C)-100.3 °F (37.9 °C)] 98.9 °F (37.2 °C)  Pulse:  [113-125] 114  Resp:  [18-24] 20  SpO2:  [95 %-100 %] 100 %  BP: (100-144)/(62-85) 137/84     Weight: 122.5 kg (270 lb 1 oz)  Body mass index is 42.3 kg/m².      Intake/Output Summary (Last 24 hours) at 4/25/2024 1152  Last data filed at 4/24/2024 1153  Gross per 24 hour   Intake 350.12 ml   Output --   Net 350.12 ml        Physical Exam  Constitutional:       General: She is not in acute distress.     Appearance: She is not ill-appearing.   Eyes:      General: No scleral icterus.  Cardiovascular:      Rate and Rhythm: Normal rate and regular rhythm.   Pulmonary:      Effort: Pulmonary effort is normal.      Breath sounds: Normal breath sounds. No wheezing, rhonchi or rales.      Comments: 6.0 Shiley, uncuffed; speaking valve present  Abdominal:      Comments: Peg secured and intact   Musculoskeletal:      Right lower leg: No edema.      Left lower leg: No edema.   Skin:     General: Skin is warm and dry.      Capillary Refill: Capillary refill takes less than 2 seconds.   Neurological:      Mental Status: She is alert.      Comments:  Patient tracks, good cough/gag reflex; unable to follow verbal commands but can do some mimicking of hand movements; no focal deficits appreciated   Psychiatric:         Mood and Affect: Mood normal.         Behavior: Behavior normal.          Vents:  Oxygen Concentration (%): 28 (04/25/24 0800)    Lines/Drains/Airways       Central Venous Catheter Line  Duration                  Hemodialysis Catheter 02/29/24 1528 right internal jugular 55 days              Drain  Duration                  Gastrostomy/Enterostomy 02/22/24 1200 LUQ 62 days              Airway  Duration             Adult Surgical Airway 03/13/24 1015 Shiley Uncuffed 6.0/ 75mm 43 days              Peripheral Intravenous Line  Duration                  Peripheral IV - Single Lumen 04/14/24 1212 20 G Anterior;Proximal;Right Forearm 10 days                    Significant Labs:    CBC/Anemia Profile:  Recent Labs   Lab 04/24/24  0428   WBC 9.18   HGB 9.5*   HCT 31.6*      MCV 91   RDW 15.1*        Chemistries:  Recent Labs   Lab 04/24/24  0801 04/25/24  0706   * 130*  130*   K 4.6 4.5  4.5   CL 95 97  97   CO2 22* 18*  18*   BUN 54* 48*  48*   CREATININE 5.9* 5.3*  5.3*   CALCIUM 10.8* 10.5  10.5   ALBUMIN 3.1* 3.2*  3.2*   PROT  --  8.8*   BILITOT  --  0.3   ALKPHOS  --  124   ALT  --  28   AST  --  22   MG  --  2.4   PHOS 5.3* 4.6*       All pertinent labs within the past 24 hours have been reviewed.    Significant Imaging:   I have reviewed all pertinent imaging results/findings within the past 24 hours.  Assessment/Plan:     ENT  Status post tracheostomy  Date Placed: 2/22/24  Size:  7.5 mm ID cuffless Shiley on 03/13  Secretion Assessment: Small, white, thick   Clearance Mechanisms  Suction and Frequency: q4hr  CPT: NA  Cough Assist: NA   Nebulizers: Duonebs prn  PMV Trial: Tolerating > 12 hrs during the day  Capping: START Trial today through the weekend  Trach Collar: currently tolerating ATC   Current Setting: ATC, FiO2 28%         Recommendations   - discussed with respiratory therapy, will start a capping trial this afternoon   - plan to continue capping for 48 hours   - if patient tolerates this, we will attempt a decannulation early next week          Thank you for your consult. Pulmonary will follow up on Monday to assess these trials.      Justin Ellerman, MD  Pulmonology  [facility] - Telemetry Stepdown

## 2024-04-25 NOTE — PLAN OF CARE
Problem: Infection  Goal: Absence of Infection Signs and Symptoms  Outcome: Progressing     Problem: Skin Injury Risk Increased  Goal: Skin Health and Integrity  Outcome: Progressing     Problem: Adult Inpatient Plan of Care  Goal: Plan of Care Review  Outcome: Progressing     Problem: Bariatric Environmental Safety  Goal: Safety Maintained with Care  Outcome: Progressing     Problem: Device-Related Complication Risk (Hemodialysis)  Goal: Safe, Effective Therapy Delivery  Outcome: Progressing     Problem: Hemodynamic Instability (Hemodialysis)  Goal: Effective Tissue Perfusion  Outcome: Progressing     Problem: Diabetes Comorbidity  Goal: Blood Glucose Level Within Targeted Range  Outcome: Progressing     Problem: Adjustment to Illness (Sepsis/Septic Shock)  Goal: Optimal Coping  Outcome: Progressing

## 2024-04-25 NOTE — PROGRESS NOTES
[facility] - Telemetry [facility] Medicine  Progress Note    Patient Name: Sarah Saravia  MRN: [id number]  Patient Class: IP- Inpatient   Admission Date: 4/10/2024  Length of Stay: 14 days  Attending Physician: Soco Erickson MD  Primary Care Provider: Deanna, Primary Doctor        Subjective:     Principal Problem:Acute cystitis with hematuria        HPI:  53 yof with 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 History was given from patient's daughter. She was undergoing dialysis today and noticed she was lethargic, less alert than usual self. Pt completed dialysis and still not acting herself. EMS was called, fever of a 100.  On chart review, she did have an episode of large volume emesis around 1700.  Per EMS, she had a slightly elevated temp 100.0°F, glucose 300s.     In the ED: UA 2+ leuks, >100 WBC, many bacteria, WBC 17, CT abd/pelvis concerning for cystitis. Given vanc/zosyn    Overview/Hospital Course:  Patient was admitted to Hospital Medicine service for medical management and evaluation of urosepsis. Patient was continued on vanc/zosyn. Vascular Neurology consulted concerning mental status change with the recommendation to initiate ASA 81 QD and to obtain an MRI to r/o new stroke. Imaging pending. Nephrology was consulted for regularly scheduled dialysis. Afternoon of 4/12, patient was unable to tolerate filtration of volume during dialsis 2/2 hypotension. Patient received 500cc bolus and was transferred back to floor after finishing the session without volume removed. Will monitor for signs of volume overload. Tailoring insulin regimen while uptitrating tube feeds to goal rate. Staph Epi in all 4 bottles; ID with recommendation to continue Vanc & de-escalate meropenem to ertapenem on 04/15 through 4/16. Patient completed IV course of UTI coverage. Patient tolerated volume removal well in dialysis  throughout the rest of her hospital stay. Pending discharge to LTACH pending bed availability. Patient without BM with one episode of vomiting overnight 4/17. KUB with bowel gas and low concern for obstruction with no transition point noted. Escalating bowel regimen. Pending placement as of 4/22. Pulm consult placed to evaluate for possible trach downsize & eventual decannulation to assist placement if safe. Trach capping trial per pulm for 48h and will assess for decannulation on Monday    Interval History: No events overnight. Capping trial beginning today per pulm      Objective:     Vital Signs (Most Recent):  Temp: 98.7 °F (37.1 °C) (04/25/24 1219)  Pulse: (!) 112 (04/25/24 1250)  Resp: (!) 35 (04/25/24 1250)  BP: 109/68 (04/25/24 1219)  SpO2: 99 % (04/25/24 1250) Vital Signs (24h Range):  Temp:  [98.7 °F (37.1 °C)-100.3 °F (37.9 °C)] 98.7 °F (37.1 °C)  Pulse:  [112-125] 112  Resp:  [18-35] 35  SpO2:  [95 %-100 %] 99 %  BP: (103-144)/(68-85) 109/68     Weight: 122.5 kg (270 lb 1 oz)  Body mass index is 42.3 kg/m².  No intake or output data in the 24 hours ending 04/25/24 1337      Physical Exam  Vitals and nursing note reviewed.   Constitutional:       General: She is not in acute distress.     Appearance: She is not ill-appearing, toxic-appearing or diaphoretic.      Comments: Trach in place   HENT:      Head: Normocephalic and atraumatic.      Right Ear: External ear normal.      Left Ear: External ear normal.      Mouth/Throat:      Mouth: Mucous membranes are moist.      Pharynx: No oropharyngeal exudate.   Eyes:      General: No scleral icterus.     Extraocular Movements: Extraocular movements intact.      Pupils: Pupils are equal, round, and reactive to light.   Cardiovascular:      Rate and Rhythm: Normal rate and regular rhythm.      Pulses: Normal pulses.      Heart sounds: Normal heart sounds. No murmur heard.  Pulmonary:      Effort: Pulmonary effort is normal. No respiratory distress.      Breath  sounds: Normal breath sounds. No wheezing or rales.   Abdominal:      General: Abdomen is flat. Bowel sounds are normal. There is no distension.      Palpations: Abdomen is soft. There is no mass.      Tenderness: There is no abdominal tenderness.      Comments: Peg tube in place   Genitourinary:     Comments: Has large bandage over groin area. Pictures in chart  Musculoskeletal:         General: No swelling or tenderness. Normal range of motion.      Cervical back: Normal range of motion and neck supple.      Right lower leg: No edema.      Left lower leg: No edema.      Comments: Moves BUE spontaneously   Skin:     General: Skin is warm and dry.      Capillary Refill: Capillary refill takes less than 2 seconds.      Findings: Wound present.   Neurological:      General: No focal deficit present.      Mental Status: She is alert and oriented to person, place, and time. Mental status is at baseline.      Comments: Nonverbal   Psychiatric:         Mood and Affect: Mood normal.         Behavior: Behavior normal.      Comments: Nonverbal  intermittently following commands             Significant Labs: All pertinent labs within the past 24 hours have been reviewed.    Significant Imaging: I have reviewed all pertinent imaging results/findings within the past 24 hours.    Assessment/Plan:      * Acute cystitis with hematuria  Pt presenting with AMS and worsening lethargy. Pt is non-verbal at baseline, does not follow commands, but was less responsive than normal. Pt found to have a fever. Urinary source suspected based off of urine and CT abd/pelvis. Pt has many prior resistant bacterial infections including urinary sources. Has prior with sensitivity to zosyn and has also improved off ED dose of vanc/zosyn. Lactic elevated a 3.8->3.49 prior to completion of fluid bolus 500ml    Plan  S/p vanc/merrem, now transitioned to vanc/ertapenem (on 04/15) per ID. Course completed 4/16.  Daily cbc  Contact  precautions    Constipation  Patient without BM x5d. One episode of vomitus night of 4/17. Some concern for bowel obstruction. Tolerating continuous tube feeds at goal rate with 0cc on residuals. Physical exam with bowel sounds, soft nontender abdomen. KUB without concern for obstruction with bowel gas, lack of transition point.    Patient now with regular bowel movements on bowel regimen.     Plan  - Miralax BID, Senna BID  - One time mag citrate per PEG ordered  - compazine PRN  - please contact MD team on call with concern for worsening nausea or abdominal pain.    Moderate malnutrition  Nutrition consulted. Most recent weight and BMI monitored-     Measurements:  Wt Readings from Last 1 Encounters:   04/25/24 122.5 kg (270 lb 1 oz)   Body mass index is 42.3 kg/m².    Patient has been screened and assessed by RD.    Malnutrition Type:  Context: acute illness or injury  Level: moderate    Malnutrition Characteristic Summary:  Weight Loss (Malnutrition): 10% in 6 months  Subcutaneous Fat (Malnutrition): mild depletion  Muscle Mass (Malnutrition): mild depletion  Fluid Accumulation (Malnutrition): mild    Interventions/Recommendations (treatment strategy):  1.      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 Physical therapy services has not been scheduled. Continue all standard measures for pressure injury prevention and consult wound care for any wounds (chronic or acute).    ESRD (end stage renal disease) on dialysis  Creatine stable for now. BMP reviewed- noted Estimated Creatinine Clearance: 16.7 mL/min (A) (based on SCr of 5.3 mg/dL (H)). according to latest data. Based on current GFR, CKD stage is end stage.  Monitor UOP  and serial BMP and adjust therapy as needed. Renally dose meds. Avoid nephrotoxic medications and procedures.    -- HD MWF  -- nephro following  -- sevelamer TID    Status post tracheostomy  Tracheostomy created in February following CVA. Pulmonology consulted to assess for possibility of capping trial/decannulation, appreciate their assistance. Capping trial began today, pulm will reassess for decannulation Monday. If decannulation is possible and safe would greatly expand placement options.      Acute encephalopathy  Pt is non-verbal and does not follow commands at baseline. Vascular Neurology consulted given acute change from baseline. MRI with concern for evolution of prior strokes with noted differential of T2 hyperintensities including vasculitis vs demyelination. Discussed with Vascular Neurology; low concern for ongoing vasculitis/demyelination, likely represents typical evolution of prior infarcts.    Patient at baseline as of 4/22.     Plan  - STAT head imaging for concern of new change in mental status/focal deficit    Type 2 diabetes mellitus with hyperglycemia, with long-term current use of insulin  Patient's FSGs are controlled on current medication regimen.  Last A1c reviewed-   Lab Results   Component Value Date    HGBA1C 10.4 (H) 01/30/2024     Most recent fingerstick glucose reviewed-   Recent Labs   Lab 04/24/24  2310 04/25/24  0555 04/25/24  0821 04/25/24  1211   POCTGLUCOSE 176* 183* 203* 211*       Current correctional scale  Medium  Maintain anti-hyperglycemic dose as follows-   Antihyperglycemics (From admission, onward)      Start     Stop Route Frequency Ordered    04/17/24 1200  insulin aspart U-100 pen 4 Units         -- SubQ Every 6 hours 04/17/24 0938    04/17/24 0944  insulin aspart U-100 pen 0-10 Units         -- SubQ Every 6 hours PRN 04/17/24 0938    04/13/24 2100  insulin detemir U-100 (Levemir) pen 27 Units         -- SubQ 2 times daily 04/13/24 0931          Hold Oral  hypoglycemics while patient is in the hospital.  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 Pt underwent extensive course, currently still healing from this, but no longer has wound vac. Pt's wound currently covered with bandage. Picture in media tabs    Plan  Wound care        VTE Risk Mitigation (From admission, onward)           Ordered     heparin (porcine) injection 3,000 Units  As needed (PRN)         04/17/24 0825     heparin (porcine) injection 1,000 Units  As needed (PRN)         04/12/24 0951     heparin (porcine) injection 7,500 Units  Every 8 hours         04/11/24 0252                    Discharge Planning   ZEKE: 4/30/2024     Code Status: Full Code   Is the patient medically ready for discharge?: No    Reason for patient still in hospital (select all that apply): Patient trending condition  Discharge Plan A: Skilled Nursing Facility                  Osito Dos Santos MD  Department of Hospital Medicine   [facility] - Telemetry Stepdown

## 2024-04-25 NOTE — PT/OT/SLP PROGRESS
Occupational Therapy      Patient Name:  Sarah Saravia   MRN:  [id number]    Patient not seen today secondary to currently receiving nursing care and just completed session with PTA. Will follow-up later as time permits.    4/25/2024

## 2024-04-25 NOTE — RESPIRATORY THERAPY
Trach capped without incident. RN  aware pt on room air with 100% Sats.  Instructed family member to call RN to call Respiratory if trach needs to be uncapped for any reason.

## 2024-04-25 NOTE — PLAN OF CARE
Problem: Adult Inpatient Plan of Care  Goal: Plan of Care Review  Outcome: Progressing  Flowsheets (Taken 4/25/2024 0503)  Plan of Care Reviewed With: patient  Goal: Patient-Specific Goal (Individualized)  Outcome: Progressing  Goal: Absence of Hospital-Acquired Illness or Injury  Outcome: Progressing  Goal: Optimal Comfort and Wellbeing  Outcome: Progressing  Intervention: Monitor Pain and Promote Comfort  Flowsheets (Taken 4/25/2024 0503)  Pain Management Interventions:   pillow support provided   position adjusted  Goal: Readiness for Transition of Care  Outcome: Progressing

## 2024-04-25 NOTE — HPI
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 She has been stabilized from an infection standpoint over the last few weeks and is now getting ready for discharge. Primary team is having difficulty with placement from a trach and dialysis need standpoint.     Pulmonology was consulted for assessment of decannulation.

## 2024-04-25 NOTE — ASSESSMENT & PLAN NOTE
Creatine stable for now. BMP reviewed- noted Estimated Creatinine Clearance: 16.7 mL/min (A) (based on SCr of 5.3 mg/dL (H)). according to latest data. Based on current GFR, CKD stage is end stage.  Monitor UOP and serial BMP and adjust therapy as needed. Renally dose meds. Avoid nephrotoxic medications and procedures.    -- HD MWF  -- nephro following  -- sevelamer TID

## 2024-04-25 NOTE — RESPIRATORY THERAPY
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  Clinically Significant Surgical Hx: tracheostomy    24 Hours Vitals Range:  Temp:  [98.7 °F (37.1 °C)-100.3 °F (37.9 °C)]   Pulse:  [108-125]   Resp:  [18-35]   BP: (103-137)/(68-84)   SpO2:  [95 %-100 %]     Labs:    Recent Labs     24  0444 24  0801 24  0706   * 132* 130*  130*   K 4.0 4.6 4.5  4.5   CL 96 95 97  97   CO2 22* 22* 18*  18*   BUN 33* 54* 48*  48*   CREATININE 4.4* 5.9* 5.3*  5.3*   * 174* 185*  185*   PHOS 3.6 5.3* 4.6*   MG 2.3  --  2.4        No results for input(s): "PH", "PCO2", "PO2", "HCO3", "POCSATURATED", "BE" in the last 72 hours.    ASSESSMENT/INTERVENTIONS  Supplies at bedside      Last VS   Temp: 98.7 °F (37.1 °C) ( 1219)  Pulse: 110 ( 1554)  Resp: 20 ( 1554)  BP: 109/68 ( 1219)  SpO2: 100 % ( 1700)      Extra trachs at bedside:   Level of Consciousness: Level of Consciousness (AVPU): alert  Respiratory Effort: Respiratory Effort: Normal Expansion/Accessory Muscle Usage: Expansion/Accessory Muscles/Retractions: no use of accessory muscles  All Lung Field Breath Sounds: All Lung Fields Breath Sounds: equal bilaterally  JOSE Breath Sounds: diminished  LLL Breath Sounds: diminished  RUL Breath Sounds: diminished  RML Breath Sounds: diminished  RLL Breath Sounds: diminished  O2 Device/Concentration: RA  Surgical airway: Yes, Type: Shiley Size: 6, uncuffed  Ambu at bedside:       Active Orders   Respiratory Care    Oxygen Continuous     Frequency: Continuous     Number of Occurrences: Until Specified     Order Questions:      Device " type: Low flow      Device: Trach Collar      FiO2%: 28%      Titrate O2 per Oxygen Titration Protocol: Yes      To maintain SpO2 goal of: >= 90%      Notify MD of: Inability to achieve desired SpO2; Sudden change in patient status and requires 20% increase in FiO2; Patient requires >60% FiO2    Pulse Oximetry Continuous     Frequency: Continuous     Number of Occurrences: Until Specified    RESPIRATORY COMMUNICATION     Frequency: Continuous     Number of Occurrences: Until Specified     Order Comments: Begin trach capping trial. Do not suction via tracheostomy tube because we are assessing the pt's ability to clear secretions on her own. Place a  on trach & leave in place as long as patient is tolerating it. If the patient develops shortness of breath or respiratory distress or oxygen desaturation, remove the  & apply oxygen via trach collar. This will terminate the capping trial, & it is then okay to administer tracheal suctioning. Please make a note in the chart if capping trial is terminated.      Routine tracheostomy care     Frequency: BID     Number of Occurrences: Until Specified       RECOMMENDATIONS    We recommend: RRT Recs: Continue POC per primary team.      FOLLOW-UP    Please call back the Rapid Response RT, Kathy Hall RRT at x [phone] for any questions or concerns.

## 2024-04-25 NOTE — SUBJECTIVE & OBJECTIVE
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    Morbid obesity     Necrotizing fasciitis        Past Surgical History:   Procedure Laterality Date    CLOSURE OF WOUND Right 2/22/2024    Procedure: CLOSURE, WOUND;  Surgeon: Steve Cole MD;  [facility];  Service: General;  Laterality: Right;    ESOPHAGOGASTRODUODENOSCOPY W/ PEG N/A 2/22/2024    Procedure: EGD, WITH PEG TUBE INSERTION;  Surgeon: Steve Cole MD;  [facility];  Service: General;  Laterality: N/A;    INCISION AND DRAINAGE OF PERIRECTAL REGION N/A 1/29/2024    Procedure: INCISION AND DRAINAGE, PERIRECTAL REGION;  Surgeon: Axel Ramsay MD;  Location: [facility] OR;  Service: General;  Laterality: N/A;    LUMBAR PUNCTURE N/A 2/16/2024    Procedure: Lumbar Puncture;  Surgeon: Macy Boykin;  Location: [facility];  Service: Anesthesiology;  Laterality: N/A;    PLACEMENT, TRIALYSIS CATH Right 1/31/2024    Procedure: INSERTION, CATHETER, TRIPLE LUMEN, HEMODIALYSIS, TEMPORARY;  Surgeon: Alvin Junior MD;  Location: [facility] OR;  Service: General;  Laterality: Right;    REPLACEMENT OF WOUND VACUUM-ASSISTED CLOSURE DEVICE Right 2/12/2024    Procedure: REPLACEMENT, WOUND VAC;  Surgeon: Mundo Carmona MD;  Location: [facility] OR [address] [place] [address];  Service: General;  Laterality: Right;    REPLACEMENT OF WOUND VACUUM-ASSISTED CLOSURE DEVICE N/A 2/15/2024    Procedure: REPLACEMENT, WOUND VAC;  Surgeon: Steve Cole MD;  [facility];  Service: General;  Laterality: N/A;    REPLACEMENT OF WOUND VACUUM-ASSISTED CLOSURE DEVICE Right 2/19/2024    Procedure: REPLACEMENT, WOUND VAC;  Surgeon: Steve Cole MD;  [facility];  Service: General;  Laterality: Right;  RLE/groin    REPLACEMENT OF WOUND VACUUM-ASSISTED CLOSURE DEVICE Right 2/22/2024    Procedure: REPLACEMENT, WOUND VAC;  Surgeon: Steve Cole MD;  Location: [facility] OR [address] [place] [address];  Service: General;   Laterality: Right;    TRACHEOSTOMY N/A 2/22/2024    Procedure: CREATION, TRACHEOSTOMY;  Surgeon: Steve Cole MD;  [facility];  Service: General;  Laterality: N/A;    WOUND DEBRIDEMENT Bilateral 2/2/2024    Procedure: DEBRIDEMENT, WOUND;  Surgeon: Steve Cole MD;  [facility];  Service: General;  Laterality: Bilateral;  Bilateral groin  Possible wound vac placement    WOUND DEBRIDEMENT Right 2/6/2024    Procedure: DEBRIDEMENT, WOUND, replace wound vac, possible closure;  Surgeon: Steve Cole MD;  [facility];  Service: General;  Laterality: Right;  RLE    WOUND DEBRIDEMENT Right 2/9/2024    Procedure: DEBRIDEMENT, WOUND w wound vac change;  Surgeon: Steve Cole MD;  [facility];  Service: General;  Laterality: Right;  RLE    WOUND DEBRIDEMENT Right 2/12/2024    Procedure: R thigh wound debridement;  Surgeon: Mundo Carmona MD;  Location: [facility] OR [address] [place] [address];  Service: General;  Laterality: Right;    WOUND EXPLORATION Right 1/31/2024    Procedure: IRRIGATION & DEBRIDEMENT, WOUND DEBRIDEMENT;  Surgeon: Alvin Junior MD;  Location: [facility];  Service: General;  Laterality: Right;       Review of patient's allergies indicates:  No Known Allergies    Family History    None       Tobacco Use    Smoking status: Unknown    Smokeless tobacco: Not on file   Substance and Sexual Activity    Alcohol use: Not on file    Drug use: Not on file    Sexual activity: Not on file         Review of Systems   Unable to perform ROS: Patient nonverbal     Objective:     Vital Signs (Most Recent):  Temp: 98.9 °F (37.2 °C) (04/25/24 0800)  Pulse: (!) 114 (04/25/24 1118)  Resp: 20 (04/25/24 0800)  BP: 137/84 (04/25/24 0800)  SpO2: 100 % (04/25/24 0800) Vital Signs (24h Range):  Temp:  [98.5 °F (36.9 °C)-100.3 °F (37.9 °C)] 98.9 °F (37.2 °C)  Pulse:  [113-125] 114  Resp:  [18-24] 20  SpO2:  [95 %-100 %] 100 %  BP: (100-144)/(62-85) 137/84     Weight: 122.5 kg (270 lb 1  oz)  Body mass index is 42.3 kg/m².      Intake/Output Summary (Last 24 hours) at 4/25/2024 1152  Last data filed at 4/24/2024 1153  Gross per 24 hour   Intake 350.12 ml   Output --   Net 350.12 ml        Physical Exam  Constitutional:       General: She is not in acute distress.     Appearance: She is not ill-appearing.   Eyes:      General: No scleral icterus.  Cardiovascular:      Rate and Rhythm: Normal rate and regular rhythm.   Pulmonary:      Effort: Pulmonary effort is normal.      Breath sounds: Normal breath sounds. No wheezing, rhonchi or rales.      Comments: 6.0 Shiley, uncuffed; speaking valve present  Abdominal:      Comments: Peg secured and intact   Musculoskeletal:      Right lower leg: No edema.      Left lower leg: No edema.   Skin:     General: Skin is warm and dry.      Capillary Refill: Capillary refill takes less than 2 seconds.   Neurological:      Mental Status: She is alert.      Comments: Patient tracks, good cough/gag reflex; unable to follow verbal commands but can do some mimicking of hand movements; no focal deficits appreciated   Psychiatric:         Mood and Affect: Mood normal.         Behavior: Behavior normal.          Vents:  Oxygen Concentration (%): 28 (04/25/24 0800)    Lines/Drains/Airways       Central Venous Catheter Line  Duration                  Hemodialysis Catheter 02/29/24 1528 right internal jugular 55 days              Drain  Duration                  Gastrostomy/Enterostomy 02/22/24 1200 LUQ 62 days              Airway  Duration             Adult Surgical Airway 03/13/24 1015 Shiley Uncuffed 6.0/ 75mm 43 days              Peripheral Intravenous Line  Duration                  Peripheral IV - Single Lumen 04/14/24 1212 20 G Anterior;Proximal;Right Forearm 10 days                    Significant Labs:    CBC/Anemia Profile:  Recent Labs   Lab 04/24/24  0428   WBC 9.18   HGB 9.5*   HCT 31.6*      MCV 91   RDW 15.1*        Chemistries:  Recent Labs   Lab  04/24/24  0801 04/25/24  0706   * 130*  130*   K 4.6 4.5  4.5   CL 95 97  97   CO2 22* 18*  18*   BUN 54* 48*  48*   CREATININE 5.9* 5.3*  5.3*   CALCIUM 10.8* 10.5  10.5   ALBUMIN 3.1* 3.2*  3.2*   PROT  --  8.8*   BILITOT  --  0.3   ALKPHOS  --  124   ALT  --  28   AST  --  22   MG  --  2.4   PHOS 5.3* 4.6*       All pertinent labs within the past 24 hours have been reviewed.    Significant Imaging:   I have reviewed all pertinent imaging results/findings within the past 24 hours.

## 2024-04-25 NOTE — ASSESSMENT & PLAN NOTE
Nutrition consulted. Most recent weight and BMI monitored-     Measurements:  Wt Readings from Last 1 Encounters:   04/25/24 122.5 kg (270 lb 1 oz)   Body mass index is 42.3 kg/m².    Patient has been screened and assessed by RD.    Malnutrition Type:  Context: acute illness or injury  Level: moderate    Malnutrition Characteristic Summary:  Weight Loss (Malnutrition): 10% in 6 months  Subcutaneous Fat (Malnutrition): mild depletion  Muscle Mass (Malnutrition): mild depletion  Fluid Accumulation (Malnutrition): mild    Interventions/Recommendations (treatment strategy):  1.

## 2024-04-25 NOTE — PLAN OF CARE
CM met with patient's daughter Sema at bedside to discuss her mother's discharge plan. Dr. Erickson informed this CM that they will try to remove the trach this weekend. CM discussed that if trach is removed there will be more options for SNF/ HD placement. If trach can not be removed then HD would need to be set up at [facility] as they would be able to accommodate patient's needs. Ms. Duran expressed understanding. Will continue to follow.    Sara Loredo RN  Ext [phone]

## 2024-04-25 NOTE — SUBJECTIVE & OBJECTIVE
Interval History: No events overnight. Capping trial beginning today per pulm      Objective:     Vital Signs (Most Recent):  Temp: 98.7 °F (37.1 °C) (04/25/24 1219)  Pulse: (!) 112 (04/25/24 1250)  Resp: (!) 35 (04/25/24 1250)  BP: 109/68 (04/25/24 1219)  SpO2: 99 % (04/25/24 1250) Vital Signs (24h Range):  Temp:  [98.7 °F (37.1 °C)-100.3 °F (37.9 °C)] 98.7 °F (37.1 °C)  Pulse:  [112-125] 112  Resp:  [18-35] 35  SpO2:  [95 %-100 %] 99 %  BP: (103-144)/(68-85) 109/68     Weight: 122.5 kg (270 lb 1 oz)  Body mass index is 42.3 kg/m².  No intake or output data in the 24 hours ending 04/25/24 1337      Physical Exam  Vitals and nursing note reviewed.   Constitutional:       General: She is not in acute distress.     Appearance: She is not ill-appearing, toxic-appearing or diaphoretic.      Comments: Trach in place   HENT:      Head: Normocephalic and atraumatic.      Right Ear: External ear normal.      Left Ear: External ear normal.      Mouth/Throat:      Mouth: Mucous membranes are moist.      Pharynx: No oropharyngeal exudate.   Eyes:      General: No scleral icterus.     Extraocular Movements: Extraocular movements intact.      Pupils: Pupils are equal, round, and reactive to light.   Cardiovascular:      Rate and Rhythm: Normal rate and regular rhythm.      Pulses: Normal pulses.      Heart sounds: Normal heart sounds. No murmur heard.  Pulmonary:      Effort: Pulmonary effort is normal. No respiratory distress.      Breath sounds: Normal breath sounds. No wheezing or rales.   Abdominal:      General: Abdomen is flat. Bowel sounds are normal. There is no distension.      Palpations: Abdomen is soft. There is no mass.      Tenderness: There is no abdominal tenderness.      Comments: Peg tube in place   Genitourinary:     Comments: Has large bandage over groin area. Pictures in chart  Musculoskeletal:         General: No swelling or tenderness. Normal range of motion.      Cervical back: Normal range of motion and  neck supple.      Right lower leg: No edema.      Left lower leg: No edema.      Comments: Moves BUE spontaneously   Skin:     General: Skin is warm and dry.      Capillary Refill: Capillary refill takes less than 2 seconds.      Findings: Wound present.   Neurological:      General: No focal deficit present.      Mental Status: She is alert and oriented to person, place, and time. Mental status is at baseline.      Comments: Nonverbal   Psychiatric:         Mood and Affect: Mood normal.         Behavior: Behavior normal.      Comments: Nonverbal  intermittently following commands             Significant Labs: All pertinent labs within the past 24 hours have been reviewed.    Significant Imaging: I have reviewed all pertinent imaging results/findings within the past 24 hours.

## 2024-04-25 NOTE — ASSESSMENT & PLAN NOTE
Tracheostomy created in February following CVA. Pulmonology consulted to assess for possibility of capping trial/decannulation, appreciate their assistance. Capping trial began today, pulm will reassess for decannulation Monday. If decannulation is possible and safe would greatly expand placement options.

## 2024-04-25 NOTE — NURSING
Skin assessed during: Daily Assessment      [] No Altered Skin Integrity Present    []Prevention Measures Documented      [x] Yes- Altered Skin Integrity Present or Discovered   [x] LDA Added if Not in Epic (Describe Wound)   [] New Altered Skin Integrity was Present on Admit and Documented in LDA   [] Wound Image Taken    Wound Care Consulted? Yes    Attending Nurse:  Justa Eastman RN/Staff Member:  GONZALEZ Caruso

## 2024-04-26 PROBLEM — E83.41 HYPERMAGNESEMIA: Status: RESOLVED | Noted: 2024-04-11 | Resolved: 2024-04-26

## 2024-04-26 LAB
ALBUMIN SERPL BCP-MCNC: 3.1 G/DL (ref 3.5–5.2)
ANION GAP SERPL CALC-SCNC: 16 MMOL/L (ref 8–16)
BASOPHILS # BLD AUTO: 0.08 K/UL (ref 0–0.2)
BASOPHILS NFR BLD: 0.7 % (ref 0–1.9)
BUN SERPL-MCNC: 71 MG/DL (ref 6–20)
CALCIUM SERPL-MCNC: 10.7 MG/DL (ref 8.7–10.5)
CHLORIDE SERPL-SCNC: 96 MMOL/L (ref 95–110)
CO2 SERPL-SCNC: 18 MMOL/L (ref 23–29)
CREAT SERPL-MCNC: 6.4 MG/DL (ref 0.5–1.4)
DIFFERENTIAL METHOD BLD: ABNORMAL
EOSINOPHIL # BLD AUTO: 0.7 K/UL (ref 0–0.5)
EOSINOPHIL NFR BLD: 5.9 % (ref 0–8)
ERYTHROCYTE [DISTWIDTH] IN BLOOD BY AUTOMATED COUNT: 15 % (ref 11.5–14.5)
EST. GFR  (NO RACE VARIABLE): 7.3 ML/MIN/1.73 M^2
GLUCOSE SERPL-MCNC: 193 MG/DL (ref 70–110)
HCT VFR BLD AUTO: 33.9 % (ref 37–48.5)
HGB BLD-MCNC: 10.5 G/DL (ref 12–16)
IMM GRANULOCYTES # BLD AUTO: 0.08 K/UL (ref 0–0.04)
IMM GRANULOCYTES NFR BLD AUTO: 0.7 % (ref 0–0.5)
LYMPHOCYTES # BLD AUTO: 2.4 K/UL (ref 1–4.8)
LYMPHOCYTES NFR BLD: 20.4 % (ref 18–48)
MAGNESIUM SERPL-MCNC: 2.4 MG/DL (ref 1.6–2.6)
MCH RBC QN AUTO: 27.8 PG (ref 27–31)
MCHC RBC AUTO-ENTMCNC: 31 G/DL (ref 32–36)
MCV RBC AUTO: 90 FL (ref 82–98)
MONOCYTES # BLD AUTO: 1.8 K/UL (ref 0.3–1)
MONOCYTES NFR BLD: 15.6 % (ref 4–15)
NEUTROPHILS # BLD AUTO: 6.6 K/UL (ref 1.8–7.7)
NEUTROPHILS NFR BLD: 56.7 % (ref 38–73)
NRBC BLD-RTO: 0 /100 WBC
PHOSPHATE SERPL-MCNC: 6.2 MG/DL (ref 2.7–4.5)
PLATELET # BLD AUTO: 297 K/UL (ref 150–450)
PMV BLD AUTO: 10 FL (ref 9.2–12.9)
POCT GLUCOSE: 157 MG/DL (ref 70–110)
POCT GLUCOSE: 160 MG/DL (ref 70–110)
POCT GLUCOSE: 168 MG/DL (ref 70–110)
POCT GLUCOSE: 207 MG/DL (ref 70–110)
POTASSIUM SERPL-SCNC: 4.7 MMOL/L (ref 3.5–5.1)
RBC # BLD AUTO: 3.78 M/UL (ref 4–5.4)
SODIUM SERPL-SCNC: 130 MMOL/L (ref 136–145)
WBC # BLD AUTO: 11.57 K/UL (ref 3.9–12.7)

## 2024-04-26 PROCEDURE — 25000003 PHARM REV CODE 250: Performed by: STUDENT IN AN ORGANIZED HEALTH CARE EDUCATION/TRAINING PROGRAM

## 2024-04-26 PROCEDURE — 99900035 HC TECH TIME PER 15 MIN (STAT)

## 2024-04-26 PROCEDURE — 85025 COMPLETE CBC W/AUTO DIFF WBC: CPT

## 2024-04-26 PROCEDURE — 25000003 PHARM REV CODE 250: Performed by: INTERNAL MEDICINE

## 2024-04-26 PROCEDURE — 25000003 PHARM REV CODE 250: Performed by: NURSE PRACTITIONER

## 2024-04-26 PROCEDURE — 25000003 PHARM REV CODE 250

## 2024-04-26 PROCEDURE — 63600175 PHARM REV CODE 636 W HCPCS: Performed by: NURSE PRACTITIONER

## 2024-04-26 PROCEDURE — 80069 RENAL FUNCTION PANEL: CPT | Performed by: STUDENT IN AN ORGANIZED HEALTH CARE EDUCATION/TRAINING PROGRAM

## 2024-04-26 PROCEDURE — 36415 COLL VENOUS BLD VENIPUNCTURE: CPT

## 2024-04-26 PROCEDURE — 80100014 HC HEMODIALYSIS 1:1

## 2024-04-26 PROCEDURE — 63600175 PHARM REV CODE 636 W HCPCS

## 2024-04-26 PROCEDURE — 25000242 PHARM REV CODE 250 ALT 637 W/ HCPCS

## 2024-04-26 PROCEDURE — 94761 N-INVAS EAR/PLS OXIMETRY MLT: CPT

## 2024-04-26 PROCEDURE — 27000221 HC OXYGEN, UP TO 24 HOURS

## 2024-04-26 PROCEDURE — 20600001 HC STEP DOWN PRIVATE ROOM

## 2024-04-26 PROCEDURE — 63600175 PHARM REV CODE 636 W HCPCS: Mod: JW | Performed by: NURSE PRACTITIONER

## 2024-04-26 PROCEDURE — 83735 ASSAY OF MAGNESIUM: CPT | Performed by: STUDENT IN AN ORGANIZED HEALTH CARE EDUCATION/TRAINING PROGRAM

## 2024-04-26 PROCEDURE — 27000207 HC ISOLATION

## 2024-04-26 RX ADMIN — INSULIN ASPART 2 UNITS: 100 INJECTION, SOLUTION INTRAVENOUS; SUBCUTANEOUS at 12:04

## 2024-04-26 RX ADMIN — DOCUSATE SODIUM AND SENNOSIDES 1 TABLET: 8.6; 5 TABLET, FILM COATED ORAL at 12:04

## 2024-04-26 RX ADMIN — HEPARIN SODIUM 7500 UNITS: 5000 INJECTION INTRAVENOUS; SUBCUTANEOUS at 01:04

## 2024-04-26 RX ADMIN — Medication 500 MG: at 08:04

## 2024-04-26 RX ADMIN — HEPARIN SODIUM 7500 UNITS: 5000 INJECTION INTRAVENOUS; SUBCUTANEOUS at 05:04

## 2024-04-26 RX ADMIN — POLYETHYLENE GLYCOL 3350 17 G: 17 POWDER, FOR SOLUTION ORAL at 08:04

## 2024-04-26 RX ADMIN — ATORVASTATIN CALCIUM 40 MG: 40 TABLET, FILM COATED ORAL at 12:04

## 2024-04-26 RX ADMIN — INSULIN ASPART 4 UNITS: 100 INJECTION, SOLUTION INTRAVENOUS; SUBCUTANEOUS at 05:04

## 2024-04-26 RX ADMIN — INSULIN DETEMIR 27 UNITS: 100 INJECTION, SOLUTION SUBCUTANEOUS at 08:04

## 2024-04-26 RX ADMIN — Medication 500 MG: at 12:04

## 2024-04-26 RX ADMIN — PSYLLIUM HUSK 1 PACKET: 3.4 POWDER ORAL at 12:04

## 2024-04-26 RX ADMIN — DOCUSATE SODIUM AND SENNOSIDES 1 TABLET: 8.6; 5 TABLET, FILM COATED ORAL at 08:04

## 2024-04-26 RX ADMIN — INSULIN ASPART 4 UNITS: 100 INJECTION, SOLUTION INTRAVENOUS; SUBCUTANEOUS at 12:04

## 2024-04-26 RX ADMIN — HEPARIN SODIUM 7500 UNITS: 5000 INJECTION INTRAVENOUS; SUBCUTANEOUS at 10:04

## 2024-04-26 RX ADMIN — SODIUM CHLORIDE: 9 INJECTION, SOLUTION INTRAVENOUS at 08:04

## 2024-04-26 RX ADMIN — INSULIN DETEMIR 27 UNITS: 100 INJECTION, SOLUTION SUBCUTANEOUS at 12:04

## 2024-04-26 RX ADMIN — POLYETHYLENE GLYCOL 3350 17 G: 17 POWDER, FOR SOLUTION ORAL at 12:04

## 2024-04-26 RX ADMIN — HEPARIN SODIUM 3000 UNITS: 1000 INJECTION, SOLUTION INTRAVENOUS; SUBCUTANEOUS at 08:04

## 2024-04-26 RX ADMIN — HEPARIN SODIUM 1000 UNITS: 1000 INJECTION, SOLUTION INTRAVENOUS; SUBCUTANEOUS at 12:04

## 2024-04-26 RX ADMIN — SEVELAMER CARBONATE 0.8 G: 2400 POWDER, FOR SUSPENSION ORAL at 08:04

## 2024-04-26 RX ADMIN — SEVELAMER CARBONATE 0.8 G: 2400 POWDER, FOR SUSPENSION ORAL at 12:04

## 2024-04-26 RX ADMIN — ZINC SULFATE 220 MG (50 MG) CAPSULE 220 MG: CAPSULE at 12:04

## 2024-04-26 RX ADMIN — ERYTHROPOIETIN 6100 UNITS: 10000 INJECTION, SOLUTION INTRAVENOUS; SUBCUTANEOUS at 11:04

## 2024-04-26 RX ADMIN — PANTOPRAZOLE SODIUM 40 MG: 40 GRANULE, DELAYED RELEASE ORAL at 12:04

## 2024-04-26 RX ADMIN — INSULIN ASPART 2 UNITS: 100 INJECTION, SOLUTION INTRAVENOUS; SUBCUTANEOUS at 05:04

## 2024-04-26 RX ADMIN — ASPIRIN 81 MG CHEWABLE TABLET 81 MG: 81 TABLET CHEWABLE at 12:04

## 2024-04-26 NOTE — ASSESSMENT & PLAN NOTE
RESOLVED with Bowel regimen  Patient without BM x5d. One episode of vomitus night of 4/17. Some concern for bowel obstruction. Tolerating continuous tube feeds at goal rate with 0cc on residuals. Physical exam with bowel sounds, soft nontender abdomen. KUB without concern for obstruction with bowel gas, lack of transition point.    Patient now with regular bowel movements on bowel regimen.     Plan  - Miralax BID, Senna BID  - One time mag citrate per PEG ordered  - compazine PRN

## 2024-04-26 NOTE — PROGRESS NOTES
[facility] NOTE     Patient currently on hemodialysis for removal of uremic toxins .     Patient seen and evaluated on hemodialysis, tolerating treatment, see HD flowsheet for vitals and assessments.      No Hypotension, chest pain, shortness of breath, cramping, nausea or vomiting.     Target UF: 1 L as tolerated, keep MAP >65.  Capping trial in progress per pulm for 48 hr, plans for possible decannulation on Monday if patient remains stable.   Pending placement.   Continue EPO  Continue Sevelamer  Continue to monitor intake and output, daily weights   Please avoid gadolinium, fleets, phos-based laxatives, NSAIDs  Will follow closely and continue dialysis treatments while in-patient    SHERLY Gregory DNP, APRN, FNP-C  Department of Nephrology  [facility]  Pager: [phone]

## 2024-04-26 NOTE — CLINICAL REVIEW
"RAPID RESPONSE NURSE CHART REVIEW        Chart Reviewed: 04/26/2024, 9:08 AM    MRN: [id number]  Bed: 8092/8092 A    Dx: Acute cystitis with hematuria    Sarah Saravia has a past medical history of DM (diabetes mellitus), Aayz's gangrene in female, Morbid obesity, and Necrotizing fasciitis.    Last VS: BP 98/68   Pulse (!) 113   Temp 98.8 °F (37.1 °C) (Oral)   Resp 18   Ht 5' 7" (1.702 m)   Wt 122.5 kg (270 lb 1 oz)   SpO2 100%   BMI 42.30 kg/m²     24H Vital Sign Range:  Temp:  [98.5 °F (36.9 °C)-99.2 °F (37.3 °C)]   Pulse:  [106-118]   Resp:  [15-35]   BP: ()/(66-97)   SpO2:  [95 %-100 %]     Level of Consciousness (AVPU):  (py not in room)    Recent Labs     04/24/24  0428 04/26/24  0431   WBC 9.18 11.57   HGB 9.5* 10.5*   HCT 31.6* 33.9*    297       Recent Labs     04/24/24  0801 04/25/24  0706   * 130*  130*   K 4.6 4.5  4.5   CL 95 97  97   CO2 22* 18*  18*   BUN 54* 48*  48*   CREATININE 5.9* 5.3*  5.3*   * 185*  185*   PHOS 5.3* 4.6*   MG  --  2.4        OXYGEN:  Flow (L/min) (Oxygen Therapy): 5  Oxygen Concentration (%): 21       MEWS score: 3    Patient continues to be tachycardia, no change in clinical picture noted. Rounded with Charge Berta BARBER  No additional concerns verbalized at this time. Instructed to call [phone] for further concerns or assistance.    Dora Gunderson RN        "

## 2024-04-26 NOTE — PROGRESS NOTES
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 History was given from patient's daughter. She was undergoing dialysis today and noticed she was lethargic, less alert than usual self. Pt completed dialysis and still not acting herself. EMS was called, fever of a 100.  On chart review, she did have an episode of large volume emesis around 1700.  Per EMS, she had a slightly elevated temp 100.0°F, glucose 300s.     In the ED: UA 2+ leuks, >100 WBC, many bacteria, WBC 17, CT abd/pelvis concerning for cystitis. Given vanc/zosyn    Overview/Hospital Course:  Patient was admitted to Hospital Medicine service for medical management and evaluation of urosepsis. Patient was continued on vanc/zosyn. Vascular Neurology consulted concerning mental status change with the recommendation to initiate ASA 81 QD and to obtain an MRI to r/o new stroke. Imaging pending. Nephrology was consulted for regularly scheduled dialysis. Afternoon of 4/12, patient was unable to tolerate filtration of volume during dialsis 2/2 hypotension. Patient received 500cc bolus and was transferred back to floor after finishing the session without volume removed. Will monitor for signs of volume overload. Tailoring insulin regimen while uptitrating tube feeds to goal rate. Staph Epi in all 4 bottles; ID with recommendation to continue Vanc & de-escalate meropenem to ertapenem on 04/15 through 4/16. Patient completed IV course of UTI coverage. Patient tolerated volume removal well in dialysis  throughout the rest of her hospital stay. Pending discharge to LTACH pending bed availability. Patient without BM with one episode of vomiting overnight 4/17. KUB with bowel gas and low concern for obstruction with no transition point noted. Escalating bowel regimen. Pending placement as of 4/22. Pulm consult placed to evaluate for possible trach downsize & eventual decannulation to assist placement if safe. Trach capping trial per pulm for 48h and will assess for decannulation on Monday. Pending DVT studies given fevers and tachycardia.     Interval History: No events overnight. On Capping trial. Pending DVT studies.       Objective:     Vital Signs (Most Recent):  Temp: 98.2 °F (36.8 °C) (04/26/24 1248)  Pulse: (!) 114 (04/26/24 1215)  Resp: 20 (04/26/24 1248)  BP: 109/77 (04/26/24 1215)  SpO2: 99 % (04/26/24 1215) Vital Signs (24h Range):  Temp:  [97.7 °F (36.5 °C)-99.2 °F (37.3 °C)] 98.2 °F (36.8 °C)  Pulse:  [106-117] 114  Resp:  [15-27] 20  SpO2:  [95 %-100 %] 99 %  BP: ()/(63-97) 109/77     Weight: 122.5 kg (270 lb 1 oz)  Body mass index is 42.3 kg/m².    Intake/Output Summary (Last 24 hours) at 4/26/2024 1506  Last data filed at 4/26/2024 1202  Gross per 24 hour   Intake 1180 ml   Output 1650 ml   Net -470 ml         Physical Exam  Vitals and nursing note reviewed.   Constitutional:       General: She is not in acute distress.     Appearance: She is not ill-appearing, toxic-appearing or diaphoretic.      Comments: Trach in place   HENT:      Head: Normocephalic and atraumatic.      Right Ear: External ear normal.      Left Ear: External ear normal.      Mouth/Throat:      Mouth: Mucous membranes are moist.      Pharynx: No oropharyngeal exudate.   Eyes:      General: No scleral icterus.     Extraocular Movements: Extraocular movements intact.      Pupils: Pupils are equal, round, and reactive to light.   Cardiovascular:      Rate and Rhythm: Normal rate and regular rhythm.      Pulses: Normal pulses.       Heart sounds: Normal heart sounds. No murmur heard.  Pulmonary:      Effort: Pulmonary effort is normal. No respiratory distress.      Breath sounds: Normal breath sounds. No wheezing or rales.   Abdominal:      General: Abdomen is flat. Bowel sounds are normal. There is no distension.      Palpations: Abdomen is soft. There is no mass.      Tenderness: There is no abdominal tenderness.      Comments: Peg tube in place   Genitourinary:     Comments: Has large bandage over groin area. Pictures in chart  Musculoskeletal:         General: No swelling or tenderness. Normal range of motion.      Cervical back: Normal range of motion and neck supple.      Right lower leg: No edema.      Left lower leg: No edema.      Comments: Moves BUE spontaneously   Skin:     General: Skin is warm and dry.      Capillary Refill: Capillary refill takes less than 2 seconds.      Findings: Wound present.   Neurological:      General: No focal deficit present.      Mental Status: She is alert and oriented to person, place, and time. Mental status is at baseline.      Comments: Nonverbal   Psychiatric:         Mood and Affect: Mood normal.         Behavior: Behavior normal.      Comments: Nonverbal  intermittently following commands             Significant Labs: All pertinent labs within the past 24 hours have been reviewed.    Significant Imaging: I have reviewed all pertinent imaging results/findings within the past 24 hours.  Review of Systems    Assessment/Plan:      * Acute cystitis with hematuria  Pt presenting with AMS and worsening lethargy. Pt is non-verbal at baseline, does not follow commands, but was less responsive than normal. Pt found to have a fever. Urinary source suspected based off of urine and CT abd/pelvis. Pt has many prior resistant bacterial infections including urinary sources. Has prior with sensitivity to zosyn and has also improved off ED dose of vanc/zosyn. Lactic elevated a 3.8->3.49 prior to completion of  fluid bolus 500ml.    Patient with new fever and tachycardia on 4/25. Low threshold to initiate additional infectious workup and repeat UA.     Plan  S/p course of vanc/ertapenem per ID. Course completed 4/16.  Daily cbc  Contact precautions    Constipation  RESOLVED with Bowel regimen  Patient without BM x5d. One episode of vomitus night of 4/17. Some concern for bowel obstruction. Tolerating continuous tube feeds at goal rate with 0cc on residuals. Physical exam with bowel sounds, soft nontender abdomen. KUB without concern for obstruction with bowel gas, lack of transition point.    Patient now with regular bowel movements on bowel regimen.     Plan  - Miralax BID, Senna BID  - One time mag citrate per PEG ordered  - compazine PRN    Moderate malnutrition  Nutrition consulted. Most recent weight and BMI monitored-     Measurements:  Wt Readings from Last 1 Encounters:   04/25/24 122.5 kg (270 lb 1 oz)   Body mass index is 42.3 kg/m².    Patient has been screened and assessed by RD.    Malnutrition Type:  Context: acute illness or injury  Level: moderate    Malnutrition Characteristic Summary:  Weight Loss (Malnutrition): 10% in 6 months  Subcutaneous Fat (Malnutrition): mild depletion  Muscle Mass (Malnutrition): mild depletion  Fluid Accumulation (Malnutrition): mild    Interventions/Recommendations (treatment strategy):  1.      Prolonged QT interval  Qtc 526, limit Qtc prolonging drugs as able      Spastic hemiplegia of right dominant side as late effect of cerebrovascular disease   This patient has Chronic right hemiplegia due to stroke. Physical therapy services has not been scheduled. Continue all standard measures for pressure injury prevention and consult wound care for any wounds (chronic or acute).    ESRD (end stage renal disease) on dialysis  Creatine stable for now. BMP reviewed- noted Estimated Creatinine Clearance: 13.8 mL/min (A) (based on SCr of 6.4 mg/dL (H)). according to latest data. Based on  current GFR, CKD stage is end stage.  Monitor UOP and serial BMP and adjust therapy as needed. Renally dose meds. Avoid nephrotoxic medications and procedures.    -- HD MWF  -- nephro following  -- sevelamer TID    Status post tracheostomy  Tracheostomy created in February following CVA. Pulmonology consulted to assess for possibility of capping trial/decannulation, appreciate their assistance. Capping trial began today, pulm will reassess for decannulation 4/29. If decannulation is possible and safe would greatly expand placement options.      Acute encephalopathy  Pt is non-verbal and does not follow commands at baseline. Vascular Neurology consulted given acute change from baseline. MRI with concern for evolution of prior strokes with noted differential of T2 hyperintensities including vasculitis vs demyelination. Discussed with Vascular Neurology; low concern for ongoing vasculitis/demyelination, likely represents typical evolution of prior infarcts.    Patient at baseline as of 4/22.     Plan  - STAT head imaging for concern of new change in mental status/focal deficit    Type 2 diabetes mellitus with hyperglycemia, with long-term current use of insulin  Patient's FSGs are controlled on current medication regimen.  Last A1c reviewed-   Lab Results   Component Value Date    HGBA1C 10.4 (H) 01/30/2024     Most recent fingerstick glucose reviewed-   Recent Labs   Lab 04/25/24  1748 04/26/24  0055 04/26/24  0746 04/26/24  1204   POCTGLUCOSE 124* 160* 207* 168*       Current correctional scale  Medium  Maintain anti-hyperglycemic dose as follows-   Antihyperglycemics (From admission, onward)      Start     Stop Route Frequency Ordered    04/17/24 1200  insulin aspart U-100 pen 4 Units         -- SubQ Every 6 hours 04/17/24 0938    04/17/24 0944  insulin aspart U-100 pen 0-10 Units         -- SubQ Every 6 hours PRN 04/17/24 0938    04/13/24 2100  insulin detemir U-100 (Levemir) pen 27 Units         -- SubQ 2 times  daily 04/13/24 0931          Hold Oral hypoglycemics while patient is in the hospital.  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 Pt underwent extensive course, currently still healing from this, but no longer has wound vac. Pt's wound currently covered with bandage. Picture in media tabs    Plan  Wound care        VTE Risk Mitigation (From admission, onward)           Ordered     heparin (porcine) injection 3,000 Units  As needed (PRN)         04/17/24 0825     heparin (porcine) injection 1,000 Units  As needed (PRN)         04/12/24 0951     heparin (porcine) injection 7,500 Units  Every 8 hours         04/11/24 0252                    Discharge Planning   ZEKE: 4/30/2024     Code Status: Full Code   Is the patient medically ready for discharge?: No    Reason for patient still in hospital (select all that apply): Treatment  Discharge Plan A: Skilled Nursing Facility                  Raoul Harris MD  Department of Hospital Medicine   [facility] - Telemetry Stepdown

## 2024-04-26 NOTE — PLAN OF CARE
Problem: Infection  Goal: Absence of Infection Signs and Symptoms  Outcome: Progressing     Problem: Skin Injury Risk Increased  Goal: Skin Health and Integrity  Outcome: Progressing     Problem: Adult Inpatient Plan of Care  Goal: Plan of Care Review  Outcome: Progressing     Problem: Bariatric Environmental Safety  Goal: Safety Maintained with Care  Outcome: Progressing     Problem: Device-Related Complication Risk (Hemodialysis)  Goal: Safe, Effective Therapy Delivery  Outcome: Progressing     Problem: Diabetes Comorbidity  Goal: Blood Glucose Level Within Targeted Range  Outcome: Progressing

## 2024-04-26 NOTE — PROGRESS NOTES
Stopped by room twice today to assess pt's progress on trach collar trial. Pt gone on both occasions- once to dialysis & the second time to ultrasound. Discussed with bedside nurse. Per nursing report, trach cap remains in place with no signs of deterioration in pt's respiratory status.    Donald Sandoval MD

## 2024-04-26 NOTE — PLAN OF CARE
Treatment complete. 1000 ml removed; patient tolerated well. Patient rinsed back. Catheter flushed with NS and Heparin 1000 units/ml instilled. Catheter clamped, capped, taped and wrapped. Sterile dressing change done. Patient cleaned of moderate soft brown stool and repositioned. VS stable. Report given to floor RN. ([id number]) Patient left unit via bed with transport to return to room; tube feeds paused for the trip.

## 2024-04-26 NOTE — ASSESSMENT & PLAN NOTE
Patient's FSGs are controlled on current medication regimen.  Last A1c reviewed-   Lab Results   Component Value Date    HGBA1C 10.4 (H) 01/30/2024     Most recent fingerstick glucose reviewed-   Recent Labs   Lab 04/25/24  1748 04/26/24  0055 04/26/24  0746 04/26/24  1204   POCTGLUCOSE 124* 160* 207* 168*       Current correctional scale  Medium  Maintain anti-hyperglycemic dose as follows-   Antihyperglycemics (From admission, onward)    Start     Stop Route Frequency Ordered    04/17/24 1200  insulin aspart U-100 pen 4 Units         -- SubQ Every 6 hours 04/17/24 0938    04/17/24 0944  insulin aspart U-100 pen 0-10 Units         -- SubQ Every 6 hours PRN 04/17/24 0938    04/13/24 2100  insulin detemir U-100 (Levemir) pen 27 Units         -- SubQ 2 times daily 04/13/24 0931        Hold Oral hypoglycemics while patient is in the hospital.  Aspart 3u q4h  Levemir 27u BID  Anticipate discharge with above regimen given well controlled blood glucose while at goal TF rate  MDSSI  POCT glucose q4h, please give remaining sliding scale requirement if above the scheduled 3u  Ordered current regimen with elevation in glucose

## 2024-04-26 NOTE — PT/OT/SLP PROGRESS
Speech Language Pathology  Missed Visit      Sarah Saravia  MRN: [id number]    Patient not seen today secondary to off the unit in dialysis upon first attempt, in ultrasound on second attempt. Will follow-up next service date.

## 2024-04-26 NOTE — SUBJECTIVE & OBJECTIVE
Interval History: No events overnight. On Capping trial. Pending DVT studies.       Objective:     Vital Signs (Most Recent):  Temp: 98.2 °F (36.8 °C) (04/26/24 1248)  Pulse: (!) 114 (04/26/24 1215)  Resp: 20 (04/26/24 1248)  BP: 109/77 (04/26/24 1215)  SpO2: 99 % (04/26/24 1215) Vital Signs (24h Range):  Temp:  [97.7 °F (36.5 °C)-99.2 °F (37.3 °C)] 98.2 °F (36.8 °C)  Pulse:  [106-117] 114  Resp:  [15-27] 20  SpO2:  [95 %-100 %] 99 %  BP: ()/(63-97) 109/77     Weight: 122.5 kg (270 lb 1 oz)  Body mass index is 42.3 kg/m².    Intake/Output Summary (Last 24 hours) at 4/26/2024 1506  Last data filed at 4/26/2024 1202  Gross per 24 hour   Intake 1180 ml   Output 1650 ml   Net -470 ml         Physical Exam  Vitals and nursing note reviewed.   Constitutional:       General: She is not in acute distress.     Appearance: She is not ill-appearing, toxic-appearing or diaphoretic.      Comments: Trach in place   HENT:      Head: Normocephalic and atraumatic.      Right Ear: External ear normal.      Left Ear: External ear normal.      Mouth/Throat:      Mouth: Mucous membranes are moist.      Pharynx: No oropharyngeal exudate.   Eyes:      General: No scleral icterus.     Extraocular Movements: Extraocular movements intact.      Pupils: Pupils are equal, round, and reactive to light.   Cardiovascular:      Rate and Rhythm: Normal rate and regular rhythm.      Pulses: Normal pulses.      Heart sounds: Normal heart sounds. No murmur heard.  Pulmonary:      Effort: Pulmonary effort is normal. No respiratory distress.      Breath sounds: Normal breath sounds. No wheezing or rales.   Abdominal:      General: Abdomen is flat. Bowel sounds are normal. There is no distension.      Palpations: Abdomen is soft. There is no mass.      Tenderness: There is no abdominal tenderness.      Comments: Peg tube in place   Genitourinary:     Comments: Has large bandage over groin area. Pictures in chart  Musculoskeletal:         General: No  swelling or tenderness. Normal range of motion.      Cervical back: Normal range of motion and neck supple.      Right lower leg: No edema.      Left lower leg: No edema.      Comments: Moves BUE spontaneously   Skin:     General: Skin is warm and dry.      Capillary Refill: Capillary refill takes less than 2 seconds.      Findings: Wound present.   Neurological:      General: No focal deficit present.      Mental Status: She is alert and oriented to person, place, and time. Mental status is at baseline.      Comments: Nonverbal   Psychiatric:         Mood and Affect: Mood normal.         Behavior: Behavior normal.      Comments: Nonverbal  intermittently following commands             Significant Labs: All pertinent labs within the past 24 hours have been reviewed.    Significant Imaging: I have reviewed all pertinent imaging results/findings within the past 24 hours.  Review of Systems

## 2024-04-26 NOTE — ASSESSMENT & PLAN NOTE
Tracheostomy created in February following CVA. Pulmonology consulted to assess for possibility of capping trial/decannulation, appreciate their assistance. Capping trial began today, pulm will reassess for decannulation 4/29. If decannulation is possible and safe would greatly expand placement options.

## 2024-04-26 NOTE — RESPIRATORY THERAPY
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  Clinically Significant Surgical Hx: tracheostomy    24 Hours Vitals Range:  Temp:  [98.5 °F (36.9 °C)-99.2 °F (37.3 °C)]   Pulse:  [106-118]   Resp:  [15-35]   BP: ()/(63-97)   SpO2:  [95 %-100 %]     Labs:    Recent Labs     24  0801 24  0706 24  0813   * 130*  130* 130*   K 4.6 4.5  4.5 4.7   CL 95 97  97 96   CO2 22* 18*  18* 18*   BUN 54* 48*  48* 71*   CREATININE 5.9* 5.3*  5.3* 6.4*   * 185*  185* 193*   PHOS 5.3* 4.6* 6.2*   MG  --  2.4 2.4        No results for input(s): "PH", "PCO2", "PO2", "HCO3", "POCSATURATED", "BE" in the last 72 hours.    ASSESSMENT/INTERVENTIONS  Pt resting comfortably with no respiratory concern during visit. Surgical airway clean and secure, all supplies at bedside.      Last VS   Temp: 98.6 °F (37 °C) ( 0750)  Pulse: 116 ( 1100)  Resp: 18 ( 0800)  BP: 112/73 ( 1100)  SpO2: 100 % ( 1100)      Extra trachs at bedside: 6.0 & 4.0 Shiley cuffed  Level of Consciousness: Level of Consciousness (AVPU):  (py not in room)  Respiratory Effort: Respiratory Effort: Unlabored, Normal Expansion/Accessory Muscle Usage: Expansion/Accessory Muscles/Retractions: no use of accessory muscles  All Lung Field Breath Sounds: All Lung Fields Breath Sounds: Anterior:  JOSE Breath Sounds: diminished  LLL Breath Sounds: diminished  RUL Breath Sounds: diminished  RML Breath Sounds: diminished  RLL Breath Sounds: diminished  O2 Device/Concentration: room air  Surgical airway: Yes, Type: Shiley Size: 6, uncuffed *CAPPED*  Ambu at bedside: Yes     Active " Orders   Respiratory Care    Oxygen Continuous     Frequency: Continuous     Number of Occurrences: Until Specified     Order Questions:      Device type: Low flow      Device: Trach Collar      FiO2%: 28%      Titrate O2 per Oxygen Titration Protocol: Yes      To maintain SpO2 goal of: >= 90%      Notify MD of: Inability to achieve desired SpO2; Sudden change in patient status and requires 20% increase in FiO2; Patient requires >60% FiO2    Pulse Oximetry Continuous     Frequency: Continuous     Number of Occurrences: Until Specified    RESPIRATORY COMMUNICATION     Frequency: Continuous     Number of Occurrences: Until Specified     Order Comments: Begin trach capping trial. Do not suction via tracheostomy tube because we are assessing the pt's ability to clear secretions on her own. Place a  on trach & leave in place as long as patient is tolerating it. If the patient develops shortness of breath or respiratory distress or oxygen desaturation, remove the  & apply oxygen via trach collar. This will terminate the capping trial, & it is then okay to administer tracheal suctioning. Please make a note in the chart if capping trial is terminated.      Routine tracheostomy care     Frequency: BID     Number of Occurrences: Until Specified       RECOMMENDATIONS    We recommend: RRT Recs: Continue POC per primary team.      FOLLOW-UP    Please call back the Rapid Response RT, Anshul Wiseman RRT at x [phone] for any questions or concerns.

## 2024-04-26 NOTE — ASSESSMENT & PLAN NOTE
Creatine stable for now. BMP reviewed- noted Estimated Creatinine Clearance: 13.8 mL/min (A) (based on SCr of 6.4 mg/dL (H)). according to latest data. Based on current GFR, CKD stage is end stage.  Monitor UOP and serial BMP and adjust therapy as needed. Renally dose meds. Avoid nephrotoxic medications and procedures.    -- HD MWF  -- nephro following  -- sevelamer TID

## 2024-04-26 NOTE — NURSING
Nurses Note -- 4 Eyes      4/25/2024   7:37 PM      Skin assessed during: Q Shift Change      [] No Altered Skin Integrity Present    []Prevention Measures Documented      [x] Yes- Altered Skin Integrity Present or Discovered   [] LDA Added if Not in Epic (Describe Wound)   [] New Altered Skin Integrity was Present on Admit and Documented in LDA   [] Wound Image Taken    Wound Care Consulted? Yes    Attending Nurse:  Samina Eastman RN/Staff Member:  GONZALEZ CHE

## 2024-04-26 NOTE — PLAN OF CARE
Problem: Skin Injury Risk Increased  Goal: Skin Health and Integrity  Outcome: Progressing     Problem: Adult Inpatient Plan of Care  Goal: Plan of Care Review  Outcome: Progressing  Flowsheets (Taken 4/26/2024 0312)  Plan of Care Reviewed With: patient  Goal: Patient-Specific Goal (Individualized)  Outcome: Progressing  Goal: Absence of Hospital-Acquired Illness or Injury  Outcome: Progressing  Intervention: Identify and Manage Fall Risk  Flowsheets (Taken 4/26/2024 0312)  Safety Promotion/Fall Prevention:   assistive device/personal item within reach   side rails raised x 2  Goal: Optimal Comfort and Wellbeing  Outcome: Progressing  Intervention: Monitor Pain and Promote Comfort  Flowsheets (Taken 4/26/2024 0312)  Pain Management Interventions:   pillow support provided   position adjusted  Goal: Readiness for Transition of Care  Outcome: Progressing

## 2024-04-26 NOTE — PROGRESS NOTES
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    Morbid obesity     Necrotizing fasciitis        Social History     Socioeconomic History    Marital status: Single   Tobacco Use    Smoking status: Unknown     Social Determinants of Health     Financial Resource Strain: Patient Unable To Answer (3/14/2024)    Overall Financial Resource Strain (CARDIA)     Difficulty of Paying Living Expenses: Patient unable to answer   Recent Concern: Financial Resource Strain - High Risk (3/9/2024)    Overall Financial Resource Strain (CARDIA)     Difficulty of Paying Living Expenses: Very hard   Food Insecurity: Patient Unable To Answer (3/14/2024)    Hunger Vital Sign     Worried About Running Out of Food in the Last Year: Patient unable to answer     Ran Out of Food in the Last Year: Patient unable to answer   Transportation Needs: Patient Unable To Answer (3/14/2024)    PRAPARE - Transportation     Lack of Transportation (Medical): Patient unable to answer     Lack of Transportation (Non-Medical): Patient unable to answer   Physical Activity: Inactive (3/13/2024)    Exercise Vital Sign     Days of Exercise per Week: 0 days     Minutes of Exercise per Session: 0 min   Stress: Patient Unable To Answer (3/14/2024)    [employer] - Occupational Stress Questionnaire     Feeling of Stress : Patient unable to answer   Social Connections: Socially Isolated (3/13/2024)    Social Connection and Isolation Panel [NHANES]     Frequency of Communication with Friends and Family: More than three times a week     Frequency of Social Gatherings with Friends and Family: More than three times a week     Attends Buddhist Services: Never     Active Member of Clubs or Organizations: No     Attends  Club or Organization Meetings: Never     Marital Status: Never    Housing Stability: Patient Unable To Answer (3/14/2024)    Housing Stability Vital Sign     Unable to Pay for Housing in the Last Year: Patient unable to answer     Number of Places Lived in the Last Year: 1     Unstable Housing in the Last Year: Patient unable to answer   Recent Concern: Housing Stability - High Risk (3/9/2024)    Housing Stability Vital Sign     Unable to Pay for Housing in the Last Year: Yes     Unstable Housing in the Last Year: No       Precautions:     Allergies as of 04/10/2024    (No Known Allergies)       [facility] Assessment Details/Treatment   Patient seen for FU skin assmt and local wound care to R pubis/ groin. Last seen 4/19.    Reviewed chart for this encounter.   See Flow Sheet for findings.    Pt awake with daughter present. Remains nonverbal- good eye contact and moves upper extremities. Requires max assist to turn and maintain position needed for care. Skin remains clear- no PI present  upon TBSA skin assmt. Skin folds without skin issue. Local wound care continued to R pubis area with improvement noted- R groin now resolved- linear moist scar present- continued dressing for added protection/ prevention from re opening.    RECOMMENDATIONS:  R groin- 1)clean with Vashe solution and pat dry 2)apply silver hydrofiber (Aquacel Ag) to open area 3)cover with border foam dressing- sacrum 7x8 cm. Perform care MWF- or change dressing if becomes saturated. If dressing is changed daily- please re consult IP wound care.     -continue PI prevention interventions.    Discussed POC with patient's family -daughter present; MD present for assmt.  See EMR for orders & patient education.    Bedside nursing to continue care & monitoring.  Bedside nursing to maintain pressure injury prevention interventions. LUIS M bed surface in place. EHOB boots.  Current documented Tomas score is 14 with a nutrition sub scale score of 3.     04/25/24  1330   WOCN Assessment   WOCN Total Time (mins) 30   Visit Date 04/25/24   Visit Time 1330   Consult Type Follow Up   WOCN Speciality Wound   Intervention assessed;changed;chart review;orders   Teaching on-going  (daughter present at  bedside; pt nonverbal -)   Skin Interventions   Device Skin Pressure Protection absorbent pad utilized/changed   Pressure Reduction Devices heel offloading device utilized;specialty bed utilized  (IntegenX inTouch)   Skin Protection silicone foam dressing in place;skin sealant/moisture barrier applied   Positioning   Body Position turned   Head of Bed (HOB) Positioning HOB elevated   Positioning/Transfer Devices pillows;wedge   Pressure Injury Prevention    Check Moisture Management Pad Done        Incision/Site 01/29/24 2300 Right Pubis   Date First Assessed/Time First Assessed: 01/29/24 2300   Side: Right  Location: Pubis  Additional Comments: right groin area left open with betadine soaked Kerlix packing/4x4's and ABD's   Wound Image    Incision WDL ex   Dressing Appearance Moist drainage   Drainage Amount Small   Drainage Characteristics/Odor Serous   Appearance Red;Moist   Red (%), Wound Tissue Color 100 %   Periwound Area Moist   Wound Depth (cm) 0.2 cm   Care Cleansed with:;Antimicrobial agent  (Vashe solution)   Dressing Changed;Silver;Hydrofiber;Silicone;Transparent film   Dressing Change Due 04/26/24     Will continue to follow as needed and/ or as directed until discharge.    Niki Jacobs BSN, RN, CWOCN

## 2024-04-26 NOTE — ASSESSMENT & PLAN NOTE
Pt presenting with AMS and worsening lethargy. Pt is non-verbal at baseline, does not follow commands, but was less responsive than normal. Pt found to have a fever. Urinary source suspected based off of urine and CT abd/pelvis. Pt has many prior resistant bacterial infections including urinary sources. Has prior with sensitivity to zosyn and has also improved off ED dose of vanc/zosyn. Lactic elevated a 3.8->3.49 prior to completion of fluid bolus 500ml.    Patient with new fever and tachycardia on 4/25. Low threshold to initiate additional infectious workup and repeat UA.     Plan  S/p course of vanc/ertapenem per ID. Course completed 4/16.  Daily cbc  Contact precautions

## 2024-04-26 NOTE — PLAN OF CARE
[facility] - Telemetry Stepdown  Discharge Reassessment    Primary Care Provider: No, Primary Doctor    Expected Discharge Date: 4/30/2024    Reassessment (most recent)       Discharge Reassessment - 04/26/24 1220          Discharge Reassessment    Assessment Type Discharge Planning Reassessment     Did the patient's condition or plan change since previous assessment? Yes     Discharge Plan discussed with: Patient     Communicated ZEKE with patient/caregiver Yes     Discharge Plan A Skilled Nursing Facility     Discharge Plan B Home with family     DME Needed Upon Discharge  other (see comments)   TBD    Transition of Care Barriers Other (see comments)   Trach with new HD set-up    Why the patient remains in the hospital Requires continued medical care        Post-Acute Status    Post-Acute Authorization Placement     Post-Acute Placement Status Referrals Sent                   Discharge Plan A and Plan B have been determined by review of patient's clinical status, future medical and therapeutic needs, and coverage/benefits for post-acute care in coordination with multidisciplinary team members.     Sara Loredo RN  Ext [phone]

## 2024-04-26 NOTE — PROGRESS NOTES
Patient arrived via bed. Isolation precautions maintained. (0820) Right IJ permcath accessed; cathflo removed. Venous port still slightly sluggish to aspirate; flushes well. Heparin 3000 unit bolus given. HD began. Lines secured. VS stable.

## 2024-04-27 LAB
ALBUMIN SERPL BCP-MCNC: 3.1 G/DL (ref 3.5–5.2)
ANION GAP SERPL CALC-SCNC: 14 MMOL/L (ref 8–16)
BUN SERPL-MCNC: 38 MG/DL (ref 6–20)
CALCIUM SERPL-MCNC: 9.9 MG/DL (ref 8.7–10.5)
CHLORIDE SERPL-SCNC: 91 MMOL/L (ref 95–110)
CO2 SERPL-SCNC: 22 MMOL/L (ref 23–29)
CREAT SERPL-MCNC: 4.5 MG/DL (ref 0.5–1.4)
EST. GFR  (NO RACE VARIABLE): 11.1 ML/MIN/1.73 M^2
GLUCOSE SERPL-MCNC: 162 MG/DL (ref 70–110)
MAGNESIUM SERPL-MCNC: 2.1 MG/DL (ref 1.6–2.6)
PHOSPHATE SERPL-MCNC: 4.3 MG/DL (ref 2.7–4.5)
POCT GLUCOSE: 160 MG/DL (ref 70–110)
POCT GLUCOSE: 161 MG/DL (ref 70–110)
POCT GLUCOSE: 165 MG/DL (ref 70–110)
POCT GLUCOSE: 167 MG/DL (ref 70–110)
POCT GLUCOSE: 168 MG/DL (ref 70–110)
POCT GLUCOSE: 193 MG/DL (ref 70–110)
POTASSIUM SERPL-SCNC: 3.7 MMOL/L (ref 3.5–5.1)
SODIUM SERPL-SCNC: 127 MMOL/L (ref 136–145)

## 2024-04-27 PROCEDURE — 20600001 HC STEP DOWN PRIVATE ROOM

## 2024-04-27 PROCEDURE — 25000242 PHARM REV CODE 250 ALT 637 W/ HCPCS

## 2024-04-27 PROCEDURE — 25000003 PHARM REV CODE 250

## 2024-04-27 PROCEDURE — 25000003 PHARM REV CODE 250: Performed by: INTERNAL MEDICINE

## 2024-04-27 PROCEDURE — 02H633Z INSERTION OF INFUSION DEVICE INTO RIGHT ATRIUM, PERCUTANEOUS APPROACH: ICD-10-PCS | Performed by: INTERNAL MEDICINE

## 2024-04-27 PROCEDURE — 94761 N-INVAS EAR/PLS OXIMETRY MLT: CPT

## 2024-04-27 PROCEDURE — 99900035 HC TECH TIME PER 15 MIN (STAT)

## 2024-04-27 PROCEDURE — 36415 COLL VENOUS BLD VENIPUNCTURE: CPT

## 2024-04-27 PROCEDURE — 83735 ASSAY OF MAGNESIUM: CPT

## 2024-04-27 PROCEDURE — 25000003 PHARM REV CODE 250: Performed by: STUDENT IN AN ORGANIZED HEALTH CARE EDUCATION/TRAINING PROGRAM

## 2024-04-27 PROCEDURE — 0JH63XZ INSERTION OF TUNNELED VASCULAR ACCESS DEVICE INTO CHEST SUBCUTANEOUS TISSUE AND FASCIA, PERCUTANEOUS APPROACH: ICD-10-PCS | Performed by: INTERNAL MEDICINE

## 2024-04-27 PROCEDURE — 27000207 HC ISOLATION

## 2024-04-27 PROCEDURE — 80069 RENAL FUNCTION PANEL: CPT

## 2024-04-27 PROCEDURE — 63600175 PHARM REV CODE 636 W HCPCS

## 2024-04-27 RX ORDER — METOPROLOL TARTRATE 25 MG/1
25 TABLET, FILM COATED ORAL 2 TIMES DAILY
Status: DISCONTINUED | OUTPATIENT
Start: 2024-04-27 | End: 2024-07-08 | Stop reason: HOSPADM

## 2024-04-27 RX ADMIN — INSULIN ASPART 2 UNITS: 100 INJECTION, SOLUTION INTRAVENOUS; SUBCUTANEOUS at 01:04

## 2024-04-27 RX ADMIN — Medication 500 MG: at 08:04

## 2024-04-27 RX ADMIN — INSULIN ASPART 4 UNITS: 100 INJECTION, SOLUTION INTRAVENOUS; SUBCUTANEOUS at 01:04

## 2024-04-27 RX ADMIN — SEVELAMER CARBONATE 0.8 G: 2400 POWDER, FOR SUSPENSION ORAL at 09:04

## 2024-04-27 RX ADMIN — INSULIN DETEMIR 27 UNITS: 100 INJECTION, SOLUTION SUBCUTANEOUS at 09:04

## 2024-04-27 RX ADMIN — DOCUSATE SODIUM AND SENNOSIDES 1 TABLET: 8.6; 5 TABLET, FILM COATED ORAL at 09:04

## 2024-04-27 RX ADMIN — INSULIN ASPART 4 UNITS: 100 INJECTION, SOLUTION INTRAVENOUS; SUBCUTANEOUS at 12:04

## 2024-04-27 RX ADMIN — HEPARIN SODIUM 7500 UNITS: 5000 INJECTION INTRAVENOUS; SUBCUTANEOUS at 09:04

## 2024-04-27 RX ADMIN — Medication 500 MG: at 09:04

## 2024-04-27 RX ADMIN — INSULIN DETEMIR 27 UNITS: 100 INJECTION, SOLUTION SUBCUTANEOUS at 08:04

## 2024-04-27 RX ADMIN — PANTOPRAZOLE SODIUM 40 MG: 40 GRANULE, DELAYED RELEASE ORAL at 09:04

## 2024-04-27 RX ADMIN — HEPARIN SODIUM 7500 UNITS: 5000 INJECTION INTRAVENOUS; SUBCUTANEOUS at 03:04

## 2024-04-27 RX ADMIN — POLYETHYLENE GLYCOL 3350 17 G: 17 POWDER, FOR SOLUTION ORAL at 09:04

## 2024-04-27 RX ADMIN — INSULIN ASPART 4 UNITS: 100 INJECTION, SOLUTION INTRAVENOUS; SUBCUTANEOUS at 05:04

## 2024-04-27 RX ADMIN — PSYLLIUM HUSK 1 PACKET: 3.4 POWDER ORAL at 09:04

## 2024-04-27 RX ADMIN — INSULIN ASPART 2 UNITS: 100 INJECTION, SOLUTION INTRAVENOUS; SUBCUTANEOUS at 06:04

## 2024-04-27 RX ADMIN — INSULIN ASPART 4 UNITS: 100 INJECTION, SOLUTION INTRAVENOUS; SUBCUTANEOUS at 11:04

## 2024-04-27 RX ADMIN — INSULIN ASPART 4 UNITS: 100 INJECTION, SOLUTION INTRAVENOUS; SUBCUTANEOUS at 06:04

## 2024-04-27 RX ADMIN — DOCUSATE SODIUM AND SENNOSIDES 1 TABLET: 8.6; 5 TABLET, FILM COATED ORAL at 08:04

## 2024-04-27 RX ADMIN — POLYETHYLENE GLYCOL 3350 17 G: 17 POWDER, FOR SOLUTION ORAL at 08:04

## 2024-04-27 RX ADMIN — ZINC SULFATE 220 MG (50 MG) CAPSULE 220 MG: CAPSULE at 09:04

## 2024-04-27 RX ADMIN — ATORVASTATIN CALCIUM 40 MG: 40 TABLET, FILM COATED ORAL at 09:04

## 2024-04-27 RX ADMIN — METOPROLOL TARTRATE 25 MG: 25 TABLET, FILM COATED ORAL at 08:04

## 2024-04-27 RX ADMIN — SEVELAMER CARBONATE 0.8 G: 2400 POWDER, FOR SUSPENSION ORAL at 03:04

## 2024-04-27 RX ADMIN — SEVELAMER CARBONATE 0.8 G: 2400 POWDER, FOR SUSPENSION ORAL at 08:04

## 2024-04-27 RX ADMIN — ASPIRIN 81 MG CHEWABLE TABLET 81 MG: 81 TABLET CHEWABLE at 09:04

## 2024-04-27 RX ADMIN — HEPARIN SODIUM 7500 UNITS: 5000 INJECTION INTRAVENOUS; SUBCUTANEOUS at 05:04

## 2024-04-27 NOTE — CLINICAL REVIEW
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    Last VS: /80   Pulse (!) 112   Temp 99.1 °F (37.3 °C) (Oral)   Resp (!) 22   Ht 5' 7" (1.702 m)   Wt 122.5 kg (270 lb 1 oz)   SpO2 99%   BMI 42.30 kg/m²     24H Vital Sign Range:  Temp:  [97.7 °F (36.5 °C)-99.1 °F (37.3 °C)]   Pulse:  [107-121]   Resp:  [16-22]   BP: (103-144)/(63-97)   SpO2:  [99 %-100 %]     Level of Consciousness (AVPU): alert    Recent Labs     04/26/24  0431   WBC 11.57   HGB 10.5*   HCT 33.9*          Recent Labs     04/24/24  0801 04/25/24  0706 04/26/24  0813   * 130*  130* 130*   K 4.6 4.5  4.5 4.7   CL 95 97  97 96   CO2 22* 18*  18* 18*   BUN 54* 48*  48* 71*   CREATININE 5.9* 5.3*  5.3* 6.4*   * 185*  185* 193*   PHOS 5.3* 4.6* 6.2*   MG  --  2.4 2.4        OXYGEN:  Flow (L/min) (Oxygen Therapy): 5  Oxygen Concentration (%): 21       MEWS score: 3    Rounding completed with charge ELISABETH Hampton reports no change in clinical status. No additional concerns verbalized at this time. Instructed to call [phone] for further concerns or assistance.    Dora Gunderson RN        "

## 2024-04-27 NOTE — PROGRESS NOTES
[facility] - Tele[facility] Medicine  Progress Note    Patient Name: Sarah Saravia  MRN: [id number]  Patient Class: IP- Inpatient   Admission Date: 4/10/2024  Length of Stay: 16 days  Attending Physician: Soco Erickson MD  Primary Care Provider: Deanna, Primary Doctor        Subjective:     Principal Problem:Acute cystitis with hematuria        HPI:  53 yof with 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 History was given from patient's daughter. She was undergoing dialysis today and noticed she was lethargic, less alert than usual self. Pt completed dialysis and still not acting herself. EMS was called, fever of a 100.  On chart review, she did have an episode of large volume emesis around 1700.  Per EMS, she had a slightly elevated temp 100.0°F, glucose 300s.     In the ED: UA 2+ leuks, >100 WBC, many bacteria, WBC 17, CT abd/pelvis concerning for cystitis. Given vanc/zosyn    Overview/Hospital Course:  Patient was admitted to Hospital Medicine service for medical management and evaluation of urosepsis. Patient was continued on vanc/zosyn. Vascular Neurology consulted concerning mental status change with the recommendation to initiate ASA 81 QD and to obtain an MRI to r/o new stroke. Imaging pending. Nephrology was consulted for regularly scheduled dialysis. Afternoon of 4/12, patient was unable to tolerate filtration of volume during dialsis 2/2 hypotension. Patient received 500cc bolus and was transferred back to floor after finishing the session without volume removed. Will monitor for signs of volume overload. Tailoring insulin regimen while uptitrating tube feeds to goal rate. Staph Epi in all 4 bottles; ID with recommendation to continue Vanc & de-escalate meropenem to ertapenem on 04/15 through 4/16. Patient completed IV course of UTI coverage. Patient tolerated volume removal well in dialysis  throughout the rest of her hospital stay. Pending discharge to LTACH pending bed availability. Patient without BM with one episode of vomiting overnight 4/17. KUB with bowel gas and low concern for obstruction with no transition point noted. Escalating bowel regimen. Pending placement as of 4/22. Pulm consult placed to evaluate for possible trach downsize & eventual decannulation to assist placement if safe. Trach capping trial per pulm for 48h and will assess for decannulation on Monday. DVT studies negative.    Interval History: NAEON. Doing well with capping trial      Objective:     Vital Signs (Most Recent):  Temp: 98.9 °F (37.2 °C) (04/27/24 1135)  Pulse: 110 (04/27/24 1135)  Resp: 20 (04/27/24 1200)  BP: 128/64 (04/27/24 1135)  SpO2: 100 % (04/27/24 1135) Vital Signs (24h Range):  Temp:  [97.8 °F (36.6 °C)-99.1 °F (37.3 °C)] 98.9 °F (37.2 °C)  Pulse:  [109-121] 110  Resp:  [16-22] 20  SpO2:  [99 %-100 %] 100 %  BP: (109-133)/(64-88) 128/64     Weight: 122.5 kg (270 lb 1 oz)  Body mass index is 42.3 kg/m².    Intake/Output Summary (Last 24 hours) at 4/27/2024 1250  Last data filed at 4/26/2024 2214  Gross per 24 hour   Intake 1440 ml   Output --   Net 1440 ml         Physical Exam  Vitals and nursing note reviewed.   Constitutional:       General: She is not in acute distress.     Appearance: She is not ill-appearing, toxic-appearing or diaphoretic.      Comments: Trach in place   HENT:      Head: Normocephalic and atraumatic.      Right Ear: External ear normal.      Left Ear: External ear normal.      Mouth/Throat:      Mouth: Mucous membranes are moist.      Pharynx: No oropharyngeal exudate.   Eyes:      General: No scleral icterus.     Extraocular Movements: Extraocular movements intact.      Pupils: Pupils are equal, round, and reactive to light.   Cardiovascular:      Rate and Rhythm: Normal rate and regular rhythm.      Pulses: Normal pulses.      Heart sounds: Normal heart sounds. No murmur  heard.  Pulmonary:      Effort: Pulmonary effort is normal. No respiratory distress.      Breath sounds: Normal breath sounds. No wheezing or rales.   Abdominal:      General: Abdomen is flat. Bowel sounds are normal. There is no distension.      Palpations: Abdomen is soft. There is no mass.      Tenderness: There is no abdominal tenderness.      Comments: Peg tube in place   Genitourinary:     Comments: Has large bandage over groin area. Pictures in chart  Musculoskeletal:         General: No swelling or tenderness. Normal range of motion.      Cervical back: Normal range of motion and neck supple.      Right lower leg: No edema.      Left lower leg: No edema.      Comments: Moves BUE spontaneously   Skin:     General: Skin is warm and dry.      Capillary Refill: Capillary refill takes less than 2 seconds.      Findings: Wound present.   Neurological:      General: No focal deficit present.      Mental Status: She is alert and oriented to person, place, and time. Mental status is at baseline.      Comments: Nonverbal   Psychiatric:         Mood and Affect: Mood normal.         Behavior: Behavior normal.      Comments: Nonverbal  intermittently following commands             Significant Labs: All pertinent labs within the past 24 hours have been reviewed.    Significant Imaging: I have reviewed all pertinent imaging results/findings within the past 24 hours.    Assessment/Plan:      * Acute cystitis with hematuria  Pt presenting with AMS and worsening lethargy. Pt is non-verbal at baseline, does not follow commands, but was less responsive than normal. Pt found to have a fever. Urinary source suspected based off of urine and CT abd/pelvis. Pt has many prior resistant bacterial infections including urinary sources. Has prior with sensitivity to zosyn and has also improved off ED dose of vanc/zosyn. Lactic elevated a 3.8->3.49 prior to completion of fluid bolus 500ml.    Patient with new fever and tachycardia on  4/25. Low threshold to initiate additional infectious workup and repeat UA.     Plan  S/p course of vanc/ertapenem per ID. Course completed 4/16.  Daily cbc  Contact precautions    Constipation  RESOLVED with Bowel regimen  Patient without BM x5d. One episode of vomitus night of 4/17. Some concern for bowel obstruction. Tolerating continuous tube feeds at goal rate with 0cc on residuals. Physical exam with bowel sounds, soft nontender abdomen. KUB without concern for obstruction with bowel gas, lack of transition point.    Patient now with regular bowel movements on bowel regimen.     Plan  - Miralax BID, Senna BID  - One time mag citrate per PEG ordered  - compazine PRN    Moderate malnutrition  Nutrition consulted. Most recent weight and BMI monitored-     Measurements:  Wt Readings from Last 1 Encounters:   04/25/24 122.5 kg (270 lb 1 oz)   Body mass index is 42.3 kg/m².    Patient has been screened and assessed by RD.    Malnutrition Type:  Context: acute illness or injury  Level: moderate    Malnutrition Characteristic Summary:  Weight Loss (Malnutrition): 10% in 6 months  Subcutaneous Fat (Malnutrition): mild depletion  Muscle Mass (Malnutrition): mild depletion  Fluid Accumulation (Malnutrition): mild    Interventions/Recommendations (treatment strategy):  1.      Prolonged QT interval  Qtc 526, limit Qtc prolonging drugs as able      Spastic hemiplegia of right dominant side as late effect of cerebrovascular disease   This patient has Chronic right hemiplegia due to stroke. Physical therapy services has not been scheduled. Continue all standard measures for pressure injury prevention and consult wound care for any wounds (chronic or acute).    ESRD (end stage renal disease) on dialysis  Creatine stable for now. BMP reviewed- noted Estimated Creatinine Clearance: 19.6 mL/min (A) (based on SCr of 4.5 mg/dL (H)). according to latest data. Based on current GFR, CKD stage is end stage.  Monitor UOP and serial BMP  and adjust therapy as needed. Renally dose meds. Avoid nephrotoxic medications and procedures.    -- HD MWF  -- nephro following  -- sevelamer TID    Status post tracheostomy  Tracheostomy created in February following CVA. Pulmonology consulted to assess for possibility of capping trial/decannulation, appreciate their assistance. Capping trial began today, pulm will reassess for decannulation 4/29. If decannulation is possible and safe would greatly expand placement options.      Acute encephalopathy  Pt is non-verbal and does not follow commands at baseline. Vascular Neurology consulted given acute change from baseline. MRI with concern for evolution of prior strokes with noted differential of T2 hyperintensities including vasculitis vs demyelination. Discussed with Vascular Neurology; low concern for ongoing vasculitis/demyelination, likely represents typical evolution of prior infarcts.    Patient at baseline as of 4/22.     Plan  - STAT head imaging for concern of new change in mental status/focal deficit    Type 2 diabetes mellitus with hyperglycemia, with long-term current use of insulin  Patient's FSGs are controlled on current medication regimen.  Last A1c reviewed-   Lab Results   Component Value Date    HGBA1C 10.4 (H) 01/30/2024     Most recent fingerstick glucose reviewed-   Recent Labs   Lab 04/27/24  0003 04/27/24  0552 04/27/24  0832 04/27/24  1226   POCTGLUCOSE 161* 160* 165* 193*       Current correctional scale  Medium  Maintain anti-hyperglycemic dose as follows-   Antihyperglycemics (From admission, onward)      Start     Stop Route Frequency Ordered    04/17/24 1200  insulin aspart U-100 pen 4 Units         -- SubQ Every 6 hours 04/17/24 0938    04/17/24 0944  insulin aspart U-100 pen 0-10 Units         -- SubQ Every 6 hours PRN 04/17/24 0938    04/13/24 2100  insulin detemir U-100 (Levemir) pen 27 Units         -- SubQ 2 times daily 04/13/24 0931          Hold Oral hypoglycemics while patient  is in the hospital.  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 Pt underwent extensive course, currently still healing from this, but no longer has wound vac. Pt's wound currently covered with bandage. Picture in media tabs    Plan  Wound care        VTE Risk Mitigation (From admission, onward)           Ordered     heparin (porcine) injection 3,000 Units  As needed (PRN)         04/17/24 0825     heparin (porcine) injection 1,000 Units  As needed (PRN)         04/12/24 0951     heparin (porcine) injection 7,500 Units  Every 8 hours         04/11/24 0252                    Discharge Planning   ZEKE: 4/30/2024     Code Status: Full Code   Is the patient medically ready for discharge?: No    Reason for patient still in hospital (select all that apply): Patient trending condition  Discharge Plan A: Skilled Nursing Facility                  Osito Dos Santos MD  Department of Hospital Medicine   [facility] - Telemetry Stepdown

## 2024-04-27 NOTE — RESPIRATORY THERAPY
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  Clinically Significant Surgical Hx: tracheostomy    24 Hours Vitals Range:  Temp:  [97.7 °F (36.5 °C)-99.1 °F (37.3 °C)]   Pulse:  [109-121]   Resp:  [16-22]   BP: (106-133)/(67-88)   SpO2:  [99 %-100 %]     Labs:    Recent Labs     24  0706 24  0813 24  0604   *  130* 130* 127*   K 4.5  4.5 4.7 3.7   CL 97  97 96 91*   CO2 18*  18* 18* 22*   BUN 48*  48* 71* 38*   CREATININE 5.3*  5.3* 6.4* 4.5*   *  185* 193* 162*   PHOS 4.6* 6.2* 4.3   MG 2.4 2.4 2.1        No results for input(s): "PH", "PCO2", "PO2", "HCO3", "POCSATURATED", "BE" in the last 72 hours.    ASSESSMENT/INTERVENTIONS  Pt resting comfortably with no respiratory concern during visit. Surgical airway clean and secure, all supplies at bedside.      Last VS   Temp: 99 °F (37.2 °C) (758)  Pulse: 109 (758)  Resp: 18 (758)  BP: 111/77 (758)  SpO2: 100 % (758)      Extra trachs at bedside: 6.0 & 4.0 Shiley cuffed  Level of Consciousness: Level of Consciousness (AVPU): alert  Respiratory Effort: Respiratory Effort: Normal, Unlabored Expansion/Accessory Muscle Usage: Expansion/Accessory Muscles/Retractions: expansion symmetric, no retractions, no use of accessory muscles  All Lung Field Breath Sounds: All Lung Fields Breath Sounds: Anterior:, Posterior:, crackles, fine, diminished  JOSE Breath Sounds: diminished  LLL Breath Sounds: diminished  RUL Breath Sounds: diminished  RML Breath Sounds: diminished  RLL Breath Sounds: diminished  O2 Device/Concentration: room air  Surgical " airway: Yes, Type: Shiley Size: 6, uncuffed  Ambu at bedside: Yes     Active Orders   Respiratory Care    Oxygen Continuous     Frequency: Continuous     Number of Occurrences: Until Specified     Order Questions:      Device type: Low flow      Device: Trach Collar      FiO2%: 28%      Titrate O2 per Oxygen Titration Protocol: Yes      To maintain SpO2 goal of: >= 90%      Notify MD of: Inability to achieve desired SpO2; Sudden change in patient status and requires 20% increase in FiO2; Patient requires >60% FiO2    Pulse Oximetry Continuous     Frequency: Continuous     Number of Occurrences: Until Specified    RESPIRATORY COMMUNICATION     Frequency: Continuous     Number of Occurrences: Until Specified     Order Comments: Begin trach capping trial. Do not suction via tracheostomy tube because we are assessing the pt's ability to clear secretions on her own. Place a  on trach & leave in place as long as patient is tolerating it. If the patient develops shortness of breath or respiratory distress or oxygen desaturation, remove the  & apply oxygen via trach collar. This will terminate the capping trial, & it is then okay to administer tracheal suctioning. Please make a note in the chart if capping trial is terminated.      Routine tracheostomy care     Frequency: BID     Number of Occurrences: Until Specified       RECOMMENDATIONS    We recommend: RRT Recs: Continue POC per primary team.      FOLLOW-UP    Please call back the Rapid Response RT, Anshul Wiseman RRT at x [phone] for any questions or concerns.

## 2024-04-27 NOTE — ASSESSMENT & PLAN NOTE
Patient's FSGs are controlled on current medication regimen.  Last A1c reviewed-   Lab Results   Component Value Date    HGBA1C 10.4 (H) 01/30/2024     Most recent fingerstick glucose reviewed-   Recent Labs   Lab 04/27/24  0003 04/27/24  0552 04/27/24  0832 04/27/24  1226   POCTGLUCOSE 161* 160* 165* 193*       Current correctional scale  Medium  Maintain anti-hyperglycemic dose as follows-   Antihyperglycemics (From admission, onward)    Start     Stop Route Frequency Ordered    04/17/24 1200  insulin aspart U-100 pen 4 Units         -- SubQ Every 6 hours 04/17/24 0938    04/17/24 0944  insulin aspart U-100 pen 0-10 Units         -- SubQ Every 6 hours PRN 04/17/24 0938    04/13/24 2100  insulin detemir U-100 (Levemir) pen 27 Units         -- SubQ 2 times daily 04/13/24 0931        Hold Oral hypoglycemics while patient is in the hospital.  Aspart 3u q4h  Levemir 27u BID  Anticipate discharge with above regimen given well controlled blood glucose while at goal TF rate  MDSSI  POCT glucose q4h, please give remaining sliding scale requirement if above the scheduled 3u  Ordered current regimen with elevation in glucose

## 2024-04-27 NOTE — ASSESSMENT & PLAN NOTE
Creatine stable for now. BMP reviewed- noted Estimated Creatinine Clearance: 19.6 mL/min (A) (based on SCr of 4.5 mg/dL (H)). according to latest data. Based on current GFR, CKD stage is end stage.  Monitor UOP and serial BMP and adjust therapy as needed. Renally dose meds. Avoid nephrotoxic medications and procedures.    -- HD MWF  -- nephro following  -- sevelamer TID

## 2024-04-27 NOTE — SUBJECTIVE & OBJECTIVE
Interval History: NAEON. Doing well with capping trial      Objective:     Vital Signs (Most Recent):  Temp: 98.9 °F (37.2 °C) (04/27/24 1135)  Pulse: 110 (04/27/24 1135)  Resp: 20 (04/27/24 1200)  BP: 128/64 (04/27/24 1135)  SpO2: 100 % (04/27/24 1135) Vital Signs (24h Range):  Temp:  [97.8 °F (36.6 °C)-99.1 °F (37.3 °C)] 98.9 °F (37.2 °C)  Pulse:  [109-121] 110  Resp:  [16-22] 20  SpO2:  [99 %-100 %] 100 %  BP: (109-133)/(64-88) 128/64     Weight: 122.5 kg (270 lb 1 oz)  Body mass index is 42.3 kg/m².    Intake/Output Summary (Last 24 hours) at 4/27/2024 1250  Last data filed at 4/26/2024 2214  Gross per 24 hour   Intake 1440 ml   Output --   Net 1440 ml         Physical Exam  Vitals and nursing note reviewed.   Constitutional:       General: She is not in acute distress.     Appearance: She is not ill-appearing, toxic-appearing or diaphoretic.      Comments: Trach in place   HENT:      Head: Normocephalic and atraumatic.      Right Ear: External ear normal.      Left Ear: External ear normal.      Mouth/Throat:      Mouth: Mucous membranes are moist.      Pharynx: No oropharyngeal exudate.   Eyes:      General: No scleral icterus.     Extraocular Movements: Extraocular movements intact.      Pupils: Pupils are equal, round, and reactive to light.   Cardiovascular:      Rate and Rhythm: Normal rate and regular rhythm.      Pulses: Normal pulses.      Heart sounds: Normal heart sounds. No murmur heard.  Pulmonary:      Effort: Pulmonary effort is normal. No respiratory distress.      Breath sounds: Normal breath sounds. No wheezing or rales.   Abdominal:      General: Abdomen is flat. Bowel sounds are normal. There is no distension.      Palpations: Abdomen is soft. There is no mass.      Tenderness: There is no abdominal tenderness.      Comments: Peg tube in place   Genitourinary:     Comments: Has large bandage over groin area. Pictures in chart  Musculoskeletal:         General: No swelling or tenderness.  Normal range of motion.      Cervical back: Normal range of motion and neck supple.      Right lower leg: No edema.      Left lower leg: No edema.      Comments: Moves BUE spontaneously   Skin:     General: Skin is warm and dry.      Capillary Refill: Capillary refill takes less than 2 seconds.      Findings: Wound present.   Neurological:      General: No focal deficit present.      Mental Status: She is alert and oriented to person, place, and time. Mental status is at baseline.      Comments: Nonverbal   Psychiatric:         Mood and Affect: Mood normal.         Behavior: Behavior normal.      Comments: Nonverbal  intermittently following commands             Significant Labs: All pertinent labs within the past 24 hours have been reviewed.    Significant Imaging: I have reviewed all pertinent imaging results/findings within the past 24 hours.

## 2024-04-28 LAB
BASOPHILS # BLD AUTO: 0.07 K/UL (ref 0–0.2)
BASOPHILS NFR BLD: 0.6 % (ref 0–1.9)
DIFFERENTIAL METHOD BLD: ABNORMAL
EOSINOPHIL # BLD AUTO: 0.6 K/UL (ref 0–0.5)
EOSINOPHIL NFR BLD: 5.1 % (ref 0–8)
ERYTHROCYTE [DISTWIDTH] IN BLOOD BY AUTOMATED COUNT: 14.8 % (ref 11.5–14.5)
HCT VFR BLD AUTO: 31 % (ref 37–48.5)
HGB BLD-MCNC: 9.8 G/DL (ref 12–16)
IMM GRANULOCYTES # BLD AUTO: 0.14 K/UL (ref 0–0.04)
IMM GRANULOCYTES NFR BLD AUTO: 1.2 % (ref 0–0.5)
LYMPHOCYTES # BLD AUTO: 2.4 K/UL (ref 1–4.8)
LYMPHOCYTES NFR BLD: 21.3 % (ref 18–48)
MCH RBC QN AUTO: 27.3 PG (ref 27–31)
MCHC RBC AUTO-ENTMCNC: 31.6 G/DL (ref 32–36)
MCV RBC AUTO: 86 FL (ref 82–98)
MONOCYTES # BLD AUTO: 1.3 K/UL (ref 0.3–1)
MONOCYTES NFR BLD: 11.4 % (ref 4–15)
NEUTROPHILS # BLD AUTO: 6.8 K/UL (ref 1.8–7.7)
NEUTROPHILS NFR BLD: 60.4 % (ref 38–73)
NRBC BLD-RTO: 0 /100 WBC
PLATELET # BLD AUTO: 317 K/UL (ref 150–450)
PMV BLD AUTO: 9.9 FL (ref 9.2–12.9)
POCT GLUCOSE: 163 MG/DL (ref 70–110)
POCT GLUCOSE: 172 MG/DL (ref 70–110)
POCT GLUCOSE: 182 MG/DL (ref 70–110)
RBC # BLD AUTO: 3.59 M/UL (ref 4–5.4)
WBC # BLD AUTO: 11.27 K/UL (ref 3.9–12.7)

## 2024-04-28 PROCEDURE — 25000003 PHARM REV CODE 250

## 2024-04-28 PROCEDURE — 36415 COLL VENOUS BLD VENIPUNCTURE: CPT

## 2024-04-28 PROCEDURE — 99900035 HC TECH TIME PER 15 MIN (STAT)

## 2024-04-28 PROCEDURE — 25000003 PHARM REV CODE 250: Performed by: INTERNAL MEDICINE

## 2024-04-28 PROCEDURE — 25000003 PHARM REV CODE 250: Performed by: STUDENT IN AN ORGANIZED HEALTH CARE EDUCATION/TRAINING PROGRAM

## 2024-04-28 PROCEDURE — 20600001 HC STEP DOWN PRIVATE ROOM

## 2024-04-28 PROCEDURE — 85025 COMPLETE CBC W/AUTO DIFF WBC: CPT

## 2024-04-28 PROCEDURE — 94761 N-INVAS EAR/PLS OXIMETRY MLT: CPT

## 2024-04-28 PROCEDURE — 63600175 PHARM REV CODE 636 W HCPCS: Performed by: STUDENT IN AN ORGANIZED HEALTH CARE EDUCATION/TRAINING PROGRAM

## 2024-04-28 PROCEDURE — 63600175 PHARM REV CODE 636 W HCPCS

## 2024-04-28 PROCEDURE — 27000207 HC ISOLATION

## 2024-04-28 PROCEDURE — 25000242 PHARM REV CODE 250 ALT 637 W/ HCPCS

## 2024-04-28 RX ADMIN — ATORVASTATIN CALCIUM 40 MG: 40 TABLET, FILM COATED ORAL at 09:04

## 2024-04-28 RX ADMIN — METOPROLOL TARTRATE 25 MG: 25 TABLET, FILM COATED ORAL at 09:04

## 2024-04-28 RX ADMIN — HEPARIN SODIUM 7500 UNITS: 5000 INJECTION INTRAVENOUS; SUBCUTANEOUS at 03:04

## 2024-04-28 RX ADMIN — SEVELAMER CARBONATE 0.8 G: 2400 POWDER, FOR SUSPENSION ORAL at 09:04

## 2024-04-28 RX ADMIN — INSULIN ASPART 2 UNITS: 100 INJECTION, SOLUTION INTRAVENOUS; SUBCUTANEOUS at 01:04

## 2024-04-28 RX ADMIN — POLYETHYLENE GLYCOL 3350 17 G: 17 POWDER, FOR SOLUTION ORAL at 09:04

## 2024-04-28 RX ADMIN — Medication 500 MG: at 09:04

## 2024-04-28 RX ADMIN — DOCUSATE SODIUM AND SENNOSIDES 1 TABLET: 8.6; 5 TABLET, FILM COATED ORAL at 09:04

## 2024-04-28 RX ADMIN — PANTOPRAZOLE SODIUM 40 MG: 40 GRANULE, DELAYED RELEASE ORAL at 09:04

## 2024-04-28 RX ADMIN — INSULIN DETEMIR 27 UNITS: 100 INJECTION, SOLUTION SUBCUTANEOUS at 10:04

## 2024-04-28 RX ADMIN — SEVELAMER CARBONATE 0.8 G: 2400 POWDER, FOR SUSPENSION ORAL at 03:04

## 2024-04-28 RX ADMIN — HEPARIN SODIUM 7500 UNITS: 5000 INJECTION INTRAVENOUS; SUBCUTANEOUS at 09:04

## 2024-04-28 RX ADMIN — ASPIRIN 81 MG CHEWABLE TABLET 81 MG: 81 TABLET CHEWABLE at 09:04

## 2024-04-28 RX ADMIN — INSULIN DETEMIR 27 UNITS: 100 INJECTION, SOLUTION SUBCUTANEOUS at 09:04

## 2024-04-28 RX ADMIN — INSULIN ASPART 4 UNITS: 100 INJECTION, SOLUTION INTRAVENOUS; SUBCUTANEOUS at 01:04

## 2024-04-28 RX ADMIN — INSULIN ASPART 4 UNITS: 100 INJECTION, SOLUTION INTRAVENOUS; SUBCUTANEOUS at 06:04

## 2024-04-28 RX ADMIN — INSULIN ASPART 2 UNITS: 100 INJECTION, SOLUTION INTRAVENOUS; SUBCUTANEOUS at 06:04

## 2024-04-28 RX ADMIN — HEPARIN SODIUM 7500 UNITS: 5000 INJECTION INTRAVENOUS; SUBCUTANEOUS at 05:04

## 2024-04-28 RX ADMIN — PSYLLIUM HUSK 1 PACKET: 3.4 POWDER ORAL at 09:04

## 2024-04-28 RX ADMIN — INSULIN ASPART 4 UNITS: 100 INJECTION, SOLUTION INTRAVENOUS; SUBCUTANEOUS at 05:04

## 2024-04-28 RX ADMIN — ZINC SULFATE 220 MG (50 MG) CAPSULE 220 MG: CAPSULE at 09:04

## 2024-04-28 NOTE — SUBJECTIVE & OBJECTIVE
Interval History: NAEON. Doing well on trach capping trial      Objective:     Vital Signs (Most Recent):  Temp: 98.2 °F (36.8 °C) (04/28/24 1147)  Pulse: 95 (04/28/24 1147)  Resp: 19 (04/28/24 1147)  BP: 104/62 (04/28/24 1147)  SpO2: 97 % (04/28/24 1147) Vital Signs (24h Range):  Temp:  [97.9 °F (36.6 °C)-98.6 °F (37 °C)] 98.2 °F (36.8 °C)  Pulse:  [] 95  Resp:  [17-19] 19  SpO2:  [97 %-100 %] 97 %  BP: (104-142)/(62-81) 104/62     Weight: 122.5 kg (270 lb 1 oz)  Body mass index is 42.3 kg/m².    Intake/Output Summary (Last 24 hours) at 4/28/2024 1234  Last data filed at 4/27/2024 2201  Gross per 24 hour   Intake 200 ml   Output --   Net 200 ml         Physical Exam  Vitals and nursing note reviewed.   Constitutional:       General: She is not in acute distress.     Appearance: She is not ill-appearing, toxic-appearing or diaphoretic.      Comments: Trach in place   HENT:      Head: Normocephalic and atraumatic.      Right Ear: External ear normal.      Left Ear: External ear normal.      Mouth/Throat:      Mouth: Mucous membranes are moist.      Pharynx: No oropharyngeal exudate.   Eyes:      General: No scleral icterus.     Extraocular Movements: Extraocular movements intact.      Pupils: Pupils are equal, round, and reactive to light.   Cardiovascular:      Rate and Rhythm: Normal rate and regular rhythm.      Pulses: Normal pulses.      Heart sounds: Normal heart sounds. No murmur heard.  Pulmonary:      Effort: Pulmonary effort is normal. No respiratory distress.      Breath sounds: Normal breath sounds. No wheezing or rales.   Abdominal:      General: Abdomen is flat. Bowel sounds are normal. There is no distension.      Palpations: Abdomen is soft. There is no mass.      Tenderness: There is no abdominal tenderness.      Comments: Peg tube in place   Genitourinary:     Comments: Has large bandage over groin area. Pictures in chart  Musculoskeletal:         General: No swelling or tenderness. Normal  range of motion.      Cervical back: Normal range of motion and neck supple.      Right lower leg: No edema.      Left lower leg: No edema.      Comments: Moves BUE spontaneously   Skin:     General: Skin is warm and dry.      Capillary Refill: Capillary refill takes less than 2 seconds.      Findings: Wound present.   Neurological:      General: No focal deficit present.      Mental Status: She is alert and oriented to person, place, and time. Mental status is at baseline.      Comments: Nonverbal   Psychiatric:         Mood and Affect: Mood normal.         Behavior: Behavior normal.      Comments: Nonverbal  intermittently following commands             Significant Labs: All pertinent labs within the past 24 hours have been reviewed.    Significant Imaging: I have reviewed all pertinent imaging results/findings within the past 24 hours.

## 2024-04-28 NOTE — NURSING
Nurses Note -- 4 Eyes      4/27/2024   1530 PM      Skin assessed during: Q Shift Change      [] No Altered Skin Integrity Present    []Prevention Measures Documented      [x] Yes- Altered Skin Integrity Present or Discovered   [x] LDA Added if Not in Epic (Describe Wound)   [] New Altered Skin Integrity was Present on Admit and Documented in LDA   [] Wound Image Taken    Wound Care Consulted? Yes    Attending Nurse:  ELISABETH Noyola     Second RN/Staff Member: GONZALEZ Mathis

## 2024-04-28 NOTE — ASSESSMENT & PLAN NOTE
This patient has Chronic right hemiplegia due to stroke. Physical therapy services has not been scheduled. Continue all standard measures for pressure injury prevention and consult wound care for any wounds (chronic or acute).   Principal Discharge DX:	Head trauma   1

## 2024-04-28 NOTE — RESPIRATORY THERAPY
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  Clinically Significant Surgical Hx: tracheostomy    24 Hours Vitals Range:  Temp:  [97.9 °F (36.6 °C)-98.9 °F (37.2 °C)]   Pulse:  []   Resp:  [17-20]   BP: (106-142)/(63-81)   SpO2:  [98 %-100 %]     Labs:    Recent Labs     24  0813 24  0604   * 127*   K 4.7 3.7   CL 96 91*   CO2 18* 22*   BUN 71* 38*   CREATININE 6.4* 4.5*   * 162*   PHOS 6.2* 4.3   MG 2.4 2.1        No results for input(s): "PH", "PCO2", "PO2", "HCO3", "POCSATURATED", "BE" in the last 72 hours.    ASSESSMENT/INTERVENTIONS  Pt resting comfortably with no respiratory concern during visit. Surgical airway clean and secure, all supplies at bedside. Orders for trach capping trial in place, however, pt only wearing speaking valve, primary team and bedside RT made aware.       Last VS   Temp: 98.2 °F (36.8 °C) (755)  Pulse: 104 (931)  Resp: 17 (755)  BP: 122/73 (931)  SpO2: 100 % (755)      Extra trachs at bedside: 6.0 & 4.0 Shiley cuffed  Level of Consciousness: Level of Consciousness (AVPU): alert  Respiratory Effort: Respiratory Effort: Normal, Unlabored Expansion/Accessory Muscle Usage: Expansion/Accessory Muscles/Retractions: no use of accessory muscles, no retractions, expansion symmetric  All Lung Field Breath Sounds: All Lung Fields Breath Sounds: Anterior:, Lateral:, diminished  JOSE Breath Sounds: diminished  LLL Breath Sounds: diminished  RUL Breath Sounds: diminished  RML Breath Sounds: diminished  RLL Breath Sounds: diminished  O2 Device/Concentration: room " air  Surgical airway: Yes, Type: Shiley Size: 6, uncuffed  Ambu at bedside: Yes     Active Orders   Respiratory Care    Pulse Oximetry Continuous     Frequency: Continuous     Number of Occurrences: Until Specified    RESPIRATORY COMMUNICATION     Frequency: Continuous     Number of Occurrences: Until Specified     Order Comments: Begin trach capping trial. Do not suction via tracheostomy tube because we are assessing the pt's ability to clear secretions on her own. Place a  on trach & leave in place as long as patient is tolerating it. If the patient develops shortness of breath or respiratory distress or oxygen desaturation, remove the  & apply oxygen via trach collar. This will terminate the capping trial, & it is then okay to administer tracheal suctioning. Please make a note in the chart if capping trial is terminated.      Routine tracheostomy care     Frequency: BID     Number of Occurrences: Until Specified       RECOMMENDATIONS    We recommend: RRT Recs: Continue POC per primary team.      FOLLOW-UP    Please call back the Rapid Response RT, Anshul Wiseman, RRT at x [phone] for any questions or concerns.

## 2024-04-28 NOTE — PLAN OF CARE
Problem: Infection  Goal: Absence of Infection Signs and Symptoms  Outcome: Progressing     Problem: Skin Injury Risk Increased  Goal: Skin Health and Integrity  Outcome: Progressing     Problem: Adult Inpatient Plan of Care  Goal: Plan of Care Review  Outcome: Progressing  Goal: Patient-Specific Goal (Individualized)  Outcome: Progressing  Goal: Absence of Hospital-Acquired Illness or Injury  Outcome: Progressing  Goal: Optimal Comfort and Wellbeing  Outcome: Progressing  Goal: Readiness for Transition of Care  Outcome: Progressing     Problem: Bariatric Environmental Safety  Goal: Safety Maintained with Care  Outcome: Progressing     Problem: Device-Related Complication Risk (Hemodialysis)  Goal: Safe, Effective Therapy Delivery  Outcome: Progressing     Problem: Hemodynamic Instability (Hemodialysis)  Goal: Effective Tissue Perfusion  Outcome: Progressing     Problem: Infection (Hemodialysis)  Goal: Absence of Infection Signs and Symptoms  Outcome: Progressing     Problem: Diabetes Comorbidity  Goal: Blood Glucose Level Within Targeted Range  Outcome: Progressing     Problem: Adjustment to Illness (Sepsis/Septic Shock)  Goal: Optimal Coping  Outcome: Progressing     Problem: Bleeding (Sepsis/Septic Shock)  Goal: Absence of Bleeding  Outcome: Progressing     Problem: Glycemic Control Impaired (Sepsis/Septic Shock)  Goal: Blood Glucose Level Within Desired Range  Outcome: Progressing     Problem: Infection Progression (Sepsis/Septic Shock)  Goal: Absence of Infection Signs and Symptoms  Outcome: Progressing     Problem: Nutrition Impaired (Sepsis/Septic Shock)  Goal: Optimal Nutrition Intake  Outcome: Progressing     Problem: Fall Injury Risk  Goal: Absence of Fall and Fall-Related Injury  Outcome: Progressing     Problem: Adjustment to Illness (Chronic Kidney Disease)  Goal: Optimal Coping with Chronic Illness  Outcome: Progressing     Problem: Electrolyte Imbalance (Chronic Kidney Disease)  Goal: Electrolyte  Balance  Outcome: Progressing     Problem: Fluid Volume Excess (Chronic Kidney Disease)  Goal: Fluid Balance  Outcome: Progressing     Problem: Functional Decline (Chronic Kidney Disease)  Goal: Optimal Functional Ability  Outcome: Progressing     Problem: Hematologic Alteration (Chronic Kidney Disease)  Goal: Absence of Anemia Signs and Symptoms  Outcome: Progressing     Problem: Oral Intake Inadequate (Chronic Kidney Disease)  Goal: Optimal Oral Intake  Outcome: Progressing     Problem: Pain (Chronic Kidney Disease)  Goal: Acceptable Pain Control  Outcome: Progressing     Problem: Renal Function Impairment (Chronic Kidney Disease)  Goal: Minimize Renal Failure Effects  Outcome: Progressing

## 2024-04-28 NOTE — PLAN OF CARE
Patient awake, and alert but leatha orientation as pt doesn't always answer questions..  Pain assessed, pt denies having pain.  pt educated on safety, and medication use. No evidence of learning  Plan of care discussed with patient and daughter, daughter verbalized understanding. Novasource renal at goal of 40 ml/hr ongoing.  Bed alarm on. Call light within reach.  Bed  low and locked.

## 2024-04-28 NOTE — ASSESSMENT & PLAN NOTE
Patient's FSGs are controlled on current medication regimen.  Last A1c reviewed-   Lab Results   Component Value Date    HGBA1C 10.4 (H) 01/30/2024     Most recent fingerstick glucose reviewed-   Recent Labs   Lab 04/27/24  1637 04/27/24  2332 04/28/24  0504   POCTGLUCOSE 167* 168* 172*       Current correctional scale  Medium  Maintain anti-hyperglycemic dose as follows-   Antihyperglycemics (From admission, onward)    Start     Stop Route Frequency Ordered    04/17/24 1200  insulin aspart U-100 pen 4 Units         -- SubQ Every 6 hours 04/17/24 0938    04/17/24 0944  insulin aspart U-100 pen 0-10 Units         -- SubQ Every 6 hours PRN 04/17/24 0938    04/13/24 2100  insulin detemir U-100 (Levemir) pen 27 Units         -- SubQ 2 times daily 04/13/24 0931        Hold Oral hypoglycemics while patient is in the hospital.  Aspart 3u q4h  Levemir 27u BID  Anticipate discharge with above regimen given well controlled blood glucose while at goal TF rate  MDSSI  POCT glucose q4h, please give remaining sliding scale requirement if above the scheduled 3u  Ordered current regimen with elevation in glucose

## 2024-04-29 PROBLEM — E11.10 DIABETIC ACIDOSIS WITHOUT COMA: Status: RESOLVED | Noted: 2024-01-29 | Resolved: 2024-04-29

## 2024-04-29 PROBLEM — T82.9XXA COMPLICATION OF VASCULAR DIALYSIS CATHETER: Status: ACTIVE | Noted: 2024-04-29

## 2024-04-29 LAB
ALBUMIN SERPL BCP-MCNC: 3.2 G/DL (ref 3.5–5.2)
ANION GAP SERPL CALC-SCNC: 17 MMOL/L (ref 8–16)
BUN SERPL-MCNC: 75 MG/DL (ref 6–20)
CALCIUM SERPL-MCNC: 10.6 MG/DL (ref 8.7–10.5)
CHLORIDE SERPL-SCNC: 91 MMOL/L (ref 95–110)
CO2 SERPL-SCNC: 20 MMOL/L (ref 23–29)
CREAT SERPL-MCNC: 7.1 MG/DL (ref 0.5–1.4)
EST. GFR  (NO RACE VARIABLE): 6.4 ML/MIN/1.73 M^2
GLUCOSE SERPL-MCNC: 126 MG/DL (ref 70–110)
MAGNESIUM SERPL-MCNC: 2.3 MG/DL (ref 1.6–2.6)
PHOSPHATE SERPL-MCNC: 6.1 MG/DL (ref 2.7–4.5)
POCT GLUCOSE: 126 MG/DL (ref 70–110)
POCT GLUCOSE: 139 MG/DL (ref 70–110)
POCT GLUCOSE: 156 MG/DL (ref 70–110)
POCT GLUCOSE: 160 MG/DL (ref 70–110)
POTASSIUM SERPL-SCNC: 4.1 MMOL/L (ref 3.5–5.1)
SODIUM SERPL-SCNC: 128 MMOL/L (ref 136–145)

## 2024-04-29 PROCEDURE — 63600175 PHARM REV CODE 636 W HCPCS: Performed by: NURSE PRACTITIONER

## 2024-04-29 PROCEDURE — 63600175 PHARM REV CODE 636 W HCPCS: Mod: JZ,JG | Performed by: NURSE PRACTITIONER

## 2024-04-29 PROCEDURE — 20600001 HC STEP DOWN PRIVATE ROOM

## 2024-04-29 PROCEDURE — 25000003 PHARM REV CODE 250

## 2024-04-29 PROCEDURE — 80100014 HC HEMODIALYSIS 1:1

## 2024-04-29 PROCEDURE — 36415 COLL VENOUS BLD VENIPUNCTURE: CPT

## 2024-04-29 PROCEDURE — 80069 RENAL FUNCTION PANEL: CPT

## 2024-04-29 PROCEDURE — 63600175 PHARM REV CODE 636 W HCPCS

## 2024-04-29 PROCEDURE — 25000242 PHARM REV CODE 250 ALT 637 W/ HCPCS

## 2024-04-29 PROCEDURE — 27000207 HC ISOLATION

## 2024-04-29 PROCEDURE — 83735 ASSAY OF MAGNESIUM: CPT

## 2024-04-29 PROCEDURE — 25000003 PHARM REV CODE 250: Performed by: NURSE PRACTITIONER

## 2024-04-29 PROCEDURE — 99900035 HC TECH TIME PER 15 MIN (STAT)

## 2024-04-29 PROCEDURE — 25000003 PHARM REV CODE 250: Performed by: STUDENT IN AN ORGANIZED HEALTH CARE EDUCATION/TRAINING PROGRAM

## 2024-04-29 PROCEDURE — 94761 N-INVAS EAR/PLS OXIMETRY MLT: CPT

## 2024-04-29 PROCEDURE — 99223 1ST HOSP IP/OBS HIGH 75: CPT | Mod: ,,,

## 2024-04-29 PROCEDURE — 25000003 PHARM REV CODE 250: Performed by: INTERNAL MEDICINE

## 2024-04-29 RX ORDER — SODIUM CHLORIDE 9 MG/ML
INJECTION, SOLUTION INTRAVENOUS ONCE
Status: COMPLETED | OUTPATIENT
Start: 2024-04-29 | End: 2024-04-29

## 2024-04-29 RX ADMIN — SEVELAMER CARBONATE 0.8 G: 2400 POWDER, FOR SUSPENSION ORAL at 04:04

## 2024-04-29 RX ADMIN — ERYTHROPOIETIN 6100 UNITS: 10000 INJECTION, SOLUTION INTRAVENOUS; SUBCUTANEOUS at 12:04

## 2024-04-29 RX ADMIN — PANTOPRAZOLE SODIUM 40 MG: 40 GRANULE, DELAYED RELEASE ORAL at 04:04

## 2024-04-29 RX ADMIN — ALTEPLASE 4 MG: 2.2 INJECTION, POWDER, LYOPHILIZED, FOR SOLUTION INTRAVENOUS at 08:04

## 2024-04-29 RX ADMIN — PSYLLIUM HUSK 1 PACKET: 3.4 POWDER ORAL at 04:04

## 2024-04-29 RX ADMIN — ALTEPLASE 4 MG: 2.2 INJECTION, POWDER, LYOPHILIZED, FOR SOLUTION INTRAVENOUS at 01:04

## 2024-04-29 RX ADMIN — HEPARIN SODIUM 3000 UNITS: 1000 INJECTION, SOLUTION INTRAVENOUS; SUBCUTANEOUS at 08:04

## 2024-04-29 RX ADMIN — INSULIN DETEMIR 27 UNITS: 100 INJECTION, SOLUTION SUBCUTANEOUS at 04:04

## 2024-04-29 RX ADMIN — SEVELAMER CARBONATE 0.8 G: 2400 POWDER, FOR SUSPENSION ORAL at 08:04

## 2024-04-29 RX ADMIN — INSULIN ASPART 2 UNITS: 100 INJECTION, SOLUTION INTRAVENOUS; SUBCUTANEOUS at 06:04

## 2024-04-29 RX ADMIN — ATORVASTATIN CALCIUM 40 MG: 40 TABLET, FILM COATED ORAL at 04:04

## 2024-04-29 RX ADMIN — Medication 500 MG: at 08:04

## 2024-04-29 RX ADMIN — ASPIRIN 81 MG CHEWABLE TABLET 81 MG: 81 TABLET CHEWABLE at 04:04

## 2024-04-29 RX ADMIN — POLYETHYLENE GLYCOL 3350 17 G: 17 POWDER, FOR SOLUTION ORAL at 08:04

## 2024-04-29 RX ADMIN — INSULIN ASPART 4 UNITS: 100 INJECTION, SOLUTION INTRAVENOUS; SUBCUTANEOUS at 06:04

## 2024-04-29 RX ADMIN — DOCUSATE SODIUM AND SENNOSIDES 1 TABLET: 8.6; 5 TABLET, FILM COATED ORAL at 08:04

## 2024-04-29 RX ADMIN — METOPROLOL TARTRATE 25 MG: 25 TABLET, FILM COATED ORAL at 08:04

## 2024-04-29 RX ADMIN — INSULIN ASPART 4 UNITS: 100 INJECTION, SOLUTION INTRAVENOUS; SUBCUTANEOUS at 12:04

## 2024-04-29 RX ADMIN — DOCUSATE SODIUM AND SENNOSIDES 1 TABLET: 8.6; 5 TABLET, FILM COATED ORAL at 04:04

## 2024-04-29 RX ADMIN — INSULIN ASPART 4 UNITS: 100 INJECTION, SOLUTION INTRAVENOUS; SUBCUTANEOUS at 05:04

## 2024-04-29 RX ADMIN — Medication 500 MG: at 04:04

## 2024-04-29 RX ADMIN — SODIUM CHLORIDE: 9 INJECTION, SOLUTION INTRAVENOUS at 08:04

## 2024-04-29 RX ADMIN — POLYETHYLENE GLYCOL 3350 17 G: 17 POWDER, FOR SOLUTION ORAL at 04:04

## 2024-04-29 RX ADMIN — ZINC SULFATE 220 MG (50 MG) CAPSULE 220 MG: CAPSULE at 04:04

## 2024-04-29 RX ADMIN — METOPROLOL TARTRATE 25 MG: 25 TABLET, FILM COATED ORAL at 04:04

## 2024-04-29 RX ADMIN — HEPARIN SODIUM 7500 UNITS: 5000 INJECTION INTRAVENOUS; SUBCUTANEOUS at 10:04

## 2024-04-29 RX ADMIN — HEPARIN SODIUM 3000 UNITS: 1000 INJECTION, SOLUTION INTRAVENOUS; SUBCUTANEOUS at 09:04

## 2024-04-29 RX ADMIN — INSULIN DETEMIR 27 UNITS: 100 INJECTION, SOLUTION SUBCUTANEOUS at 08:04

## 2024-04-29 RX ADMIN — HEPARIN SODIUM 7500 UNITS: 5000 INJECTION INTRAVENOUS; SUBCUTANEOUS at 04:04

## 2024-04-29 RX ADMIN — HEPARIN SODIUM 7500 UNITS: 5000 INJECTION INTRAVENOUS; SUBCUTANEOUS at 05:04

## 2024-04-29 NOTE — RESPIRATORY THERAPY
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  Clinically Significant Surgical Hx: tracheostomy    24 Hours Vitals Range:  Temp:  [97.4 °F (36.3 °C)-99.9 °F (37.7 °C)]   Pulse:  []   Resp:  [16-19]   BP: (103-192)/(63-93)   SpO2:  [93 %-100 %]     Labs:    Recent Labs     24  0604 24  0445   * 128*   K 3.7 4.1   CL 91* 91*   CO2 22* 20*   BUN 38* 75*   CREATININE 4.5* 7.1*   * 126*   PHOS 4.3 6.1*   MG 2.1 2.3        No results for input(s): "PH", "PCO2", "PO2", "HCO3", "POCSATURATED", "BE" in the last 72 hours.    ASSESSMENT/INTERVENTIONS  Supplies at bedside. No other concerns at this time      Last VS   Temp: 99.9 °F (37.7 °C) ( 1558)  Pulse: 106 ( 1603)  Resp: 16 ( 1603)  BP: 118/78 ( 1558)  SpO2: 98 % ( 1603)      Extra trachs at bedside: 4/6 cuffed  Level of Consciousness: Level of Consciousness (AVPU): alert  Respiratory Effort: Respiratory Effort: Normal, Unlabored Expansion/Accessory Muscle Usage: Expansion/Accessory Muscles/Retractions: no use of accessory muscles, no retractions, expansion symmetric  All Lung Field Breath Sounds: All Lung Fields Breath Sounds: Anterior:, Lateral:, clear, diminished  JOSE Breath Sounds: diminished  LLL Breath Sounds: diminished  RUL Breath Sounds: diminished  RML Breath Sounds: diminished  RLL Breath Sounds: diminished  O2 Device/Concentration: RA  Surgical airway: Yes, Type: Shiley Size: 6, uncuffed  Ambu at bedside:       Active Orders   Respiratory Care    Pulse Oximetry Continuous     Frequency: Continuous     Number of Occurrences: Until Specified    " RESPIRATORY COMMUNICATION     Frequency: Continuous     Number of Occurrences: Until Specified     Order Comments: Begin trach capping trial. Do not suction via tracheostomy tube because we are assessing the pt's ability to clear secretions on her own. Place a  on trach & leave in place as long as patient is tolerating it. If the patient develops shortness of breath or respiratory distress or oxygen desaturation, remove the  & apply oxygen via trach collar. This will terminate the capping trial, & it is then okay to administer tracheal suctioning. Please make a note in the chart if capping trial is terminated.      Routine tracheostomy care     Frequency: BID     Number of Occurrences: Until Specified       RECOMMENDATIONS    We recommend: RRT Recs: Continue POC per primary team.      FOLLOW-UP    Please call back the Rapid Response RT, Kathy Hall, RRT at x [phone] for any questions or concerns.

## 2024-04-29 NOTE — PLAN OF CARE
Problem: Adult Inpatient Plan of Care  Goal: Plan of Care Review  Outcome: Progressing  Flowsheets (Taken 4/29/2024 0245)  Plan of Care Reviewed With:   patient   child  Goal: Patient-Specific Goal (Individualized)  Outcome: Progressing  Goal: Absence of Hospital-Acquired Illness or Injury  Outcome: Progressing  Intervention: Identify and Manage Fall Risk  Flowsheets (Taken 4/29/2024 0245)  Safety Promotion/Fall Prevention:   assistive device/personal item within reach   bed alarm set   Fall Risk reviewed with patient/family   family to remain at bedside   lighting adjusted   medications reviewed   nonskid shoes/socks when out of bed   room near unit station   side rails raised x 3   instructed to call staff for mobility  Intervention: Prevent Skin Injury  Flowsheets (Taken 4/29/2024 0245)  Body Position: turned  Skin Protection:   protective footwear used   incontinence pads utilized  Device Skin Pressure Protection:   absorbent pad utilized/changed   tubing/devices free from skin contact  Intervention: Prevent and Manage VTE (Venous Thromboembolism) Risk  Flowsheets (Taken 4/29/2024 0245)  VTE Prevention/Management:   bleeding risk assessed   bleeding precations maintained  Intervention: Prevent Infection  Flowsheets (Taken 4/29/2024 0245)  Infection Prevention:   environmental surveillance performed   hand hygiene promoted   rest/sleep promoted   single patient room provided  Goal: Optimal Comfort and Wellbeing  Outcome: Progressing  Intervention: Monitor Pain and Promote Comfort  Flowsheets (Taken 4/29/2024 0245)  Pain Management Interventions: care clustered  Intervention: Provide Person-Centered Care  Flowsheets (Taken 4/29/2024 0245)  Trust Relationship/Rapport:   care explained   questions encouraged   reassurance provided            Patient awake, and alert but leatha orientation as pt doesn't always answer questions..  Pain assessed, pt denies having pain.  pt educated on safety, and medication use. No  evidence of learning  Plan of care discussed with patient and daughter, daughter verbalized understanding. Novasource renal at goal of 40 ml/hr ongoing.  Bed alarm on. Call light within reach.  Bed  low and locked

## 2024-04-29 NOTE — PROGRESS NOTES
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 History was given from patient's daughter. She was undergoing dialysis today and noticed she was lethargic, less alert than usual self. Pt completed dialysis and still not acting herself. EMS was called, fever of a 100.  On chart review, she did have an episode of large volume emesis around 1700.  Per EMS, she had a slightly elevated temp 100.0°F, glucose 300s.     In the ED: UA 2+ leuks, >100 WBC, many bacteria, WBC 17, CT abd/pelvis concerning for cystitis. Given vanc/zosyn    Overview/Hospital Course:  Patient was admitted to Hospital Medicine service for medical management and evaluation of urosepsis. Patient was continued on vanc/zosyn. Vascular Neurology consulted concerning mental status change with the recommendation to initiate ASA 81 QD and to obtain an MRI to r/o new stroke. Imaging pending. Nephrology was consulted for regularly scheduled dialysis. Afternoon of 4/12, patient was unable to tolerate filtration of volume during dialsis 2/2 hypotension. Patient received 500cc bolus and was transferred back to floor after finishing the session without volume removed. Will monitor for signs of volume overload. Tailoring insulin regimen while uptitrating tube feeds to goal rate. Staph Epi in all 4 bottles; ID with recommendation to continue Vanc & de-escalate meropenem to ertapenem on 04/15 through 4/16. Patient completed IV course of UTI coverage. Patient tolerated volume removal well in dialysis  throughout the rest of her hospital stay. Pending discharge to LTACH pending bed availability. Patient without BM with one episode of vomiting overnight 4/17. KUB with bowel gas and low concern for obstruction with no transition point noted. Escalating bowel regimen. Pending placement as of 4/22. Pulm consult placed to evaluate for possible trach downsize & eventual decannulation to assist placement if safe. Trach capping trial per pulm for 48h and will assess for decannulation on Monday. DVT studies negative.    Interval History: NAEO. Ongoing trach capping trial. Not interactive this morning.     Objective:     Vital Signs (Most Recent):  Temp: 97.4 °F (36.3 °C) (04/29/24 0733)  Pulse: 100 (04/29/24 1245)  Resp: 18 (04/29/24 0755)  BP: (!) 141/86 (04/29/24 1245)  SpO2: 100 % (04/29/24 1215) Vital Signs (24h Range):  Temp:  [97.4 °F (36.3 °C)-98.7 °F (37.1 °C)] 97.4 °F (36.3 °C)  Pulse:  [] 100  Resp:  [16-19] 18  SpO2:  [93 %-100 %] 100 %  BP: (103-192)/(63-93) 141/86     Weight: 122.5 kg (270 lb 1 oz)  Body mass index is 42.3 kg/m².    Intake/Output Summary (Last 24 hours) at 4/29/2024 1259  Last data filed at 4/29/2024 0611  Gross per 24 hour   Intake 280 ml   Output 0 ml   Net 280 ml         Physical Exam  Vitals and nursing note reviewed.   Constitutional:       General: She is not in acute distress.     Appearance: She is not ill-appearing.   HENT:      Head: Normocephalic and atraumatic.      Mouth/Throat:      Mouth: Mucous membranes are moist.   Eyes:      General: No scleral icterus.     Extraocular Movements: Extraocular movements intact.   Neck:      Comments: Trach  Cardiovascular:      Rate and Rhythm: Normal rate and regular rhythm.      Heart sounds: Normal heart sounds.   Pulmonary:      Effort: Pulmonary effort is normal. No respiratory distress.      Breath sounds: Normal breath sounds.   Abdominal:      General: Abdomen is flat. There is no distension.      Palpations: Abdomen is soft.       Tenderness: There is no abdominal tenderness.      Comments: PEG tube   Musculoskeletal:         General: No swelling or tenderness.      Right lower leg: No edema.      Left lower leg: No edema.      Comments: Moves UEs spontaneously   Skin:     General: Skin is warm and dry.      Findings: Wound present.   Neurological:      General: No focal deficit present.      Mental Status: She is alert.      Comments: Nonverbal, not following commands             Significant Labs: All pertinent labs within the past 24 hours have been reviewed.    Significant Imaging: I have reviewed all pertinent imaging results/findings within the past 24 hours.    Assessment/Plan:      * Acute cystitis with hematuria  Pt presenting with AMS and worsening lethargy. Pt is non-verbal at baseline, does not follow commands, but was less responsive than normal. Pt found to have a fever. Urinary source suspected based off of urine and CT abd/pelvis. Pt has many prior resistant bacterial infections including urinary sources. Has prior with sensitivity to zosyn and has also improved off ED dose of vanc/zosyn. Lactic elevated a 3.8->3.49 prior to completion of fluid bolus 500ml.    Patient with new fever and tachycardia on 4/25. Low threshold to initiate additional infectious workup and repeat UA.     Plan  S/p course of vanc/ertapenem per ID. Course completed 4/16.  Daily cbc  Contact precautions    Constipation  RESOLVED with Bowel regimen  Patient without BM x5d. One episode of vomitus night of 4/17. Some concern for bowel obstruction. Tolerating continuous tube feeds at goal rate with 0cc on residuals. Physical exam with bowel sounds, soft nontender abdomen. KUB without concern for obstruction with bowel gas, lack of transition point.    Patient now with regular bowel movements on bowel regimen.     Plan  - Miralax BID, Senna BID  - One time mag citrate per PEG ordered  - compazine PRN    Moderate malnutrition  Nutrition consulted. Most recent  weight and BMI monitored-     Measurements:  Wt Readings from Last 1 Encounters:   04/25/24 122.5 kg (270 lb 1 oz)   Body mass index is 42.3 kg/m².    Patient has been screened and assessed by RD.    Malnutrition Type:  Context: acute illness or injury  Level: moderate    Malnutrition Characteristic Summary:  Weight Loss (Malnutrition): 10% in 6 months  Subcutaneous Fat (Malnutrition): mild depletion  Muscle Mass (Malnutrition): mild depletion  Fluid Accumulation (Malnutrition): mild    Interventions/Recommendations (treatment strategy):  1.      Prolonged QT interval  Qtc 526, limit Qtc prolonging drugs as able      Spastic hemiplegia of right dominant side as late effect of cerebrovascular disease   This patient has Chronic right hemiplegia due to stroke. Physical therapy services has not been scheduled. Continue all standard measures for pressure injury prevention and consult wound care for any wounds (chronic or acute).    ESRD (end stage renal disease) on dialysis  Creatine stable for now. BMP reviewed- noted Estimated Creatinine Clearance: 12.4 mL/min (A) (based on SCr of 7.1 mg/dL (H)). according to latest data. Based on current GFR, CKD stage is end stage.  Monitor UOP and serial BMP and adjust therapy as needed. Renally dose meds. Avoid nephrotoxic medications and procedures.    -- HD MWF  -- nephro following  -- sevelamer TID    Status post tracheostomy  Tracheostomy created in February following CVA. Pulmonology consulted to assess for possibility of capping trial/decannulation, appreciate their assistance. Capping trial began today, pulm will reassess for decannulation 4/29. If decannulation is possible and safe would greatly expand placement options.      Acute encephalopathy  Pt is non-verbal and does not follow commands at baseline. Vascular Neurology consulted given acute change from baseline. MRI with concern for evolution of prior strokes with noted differential of T2 hyperintensities including  vasculitis vs demyelination. Discussed with Vascular Neurology; low concern for ongoing vasculitis/demyelination, likely represents typical evolution of prior infarcts.    Patient at baseline as of 4/22.     Plan  - STAT head imaging for concern of new change in mental status/focal deficit    Type 2 diabetes mellitus with hyperglycemia, with long-term current use of insulin  Patient's FSGs are controlled on current medication regimen.  Last A1c reviewed-   Lab Results   Component Value Date    HGBA1C 10.4 (H) 01/30/2024     Most recent fingerstick glucose reviewed-   Recent Labs   Lab 04/28/24  1827 04/29/24  0046 04/29/24  0546   POCTGLUCOSE 163* 126* 156*       Current correctional scale  Medium  Maintain anti-hyperglycemic dose as follows-   Antihyperglycemics (From admission, onward)      Start     Stop Route Frequency Ordered    04/17/24 1200  insulin aspart U-100 pen 4 Units         -- SubQ Every 6 hours 04/17/24 0938    04/17/24 0944  insulin aspart U-100 pen 0-10 Units         -- SubQ Every 6 hours PRN 04/17/24 0938    04/13/24 2100  insulin detemir U-100 (Levemir) pen 27 Units         -- SubQ 2 times daily 04/13/24 0931          Hold Oral hypoglycemics while patient is in the hospital.  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 Pt underwent extensive course, currently still healing from this, but no longer has wound vac. Pt's wound currently covered with bandage. Picture in media tabs    Plan  Wound care        VTE Risk Mitigation (From admission, onward)           Ordered     heparin (porcine) injection 3,000 Units  As needed (PRN)         04/17/24 0825     heparin (porcine)  injection 1,000 Units  As needed (PRN)         04/12/24 0951     heparin (porcine) injection 7,500 Units  Every 8 hours         04/11/24 0252                    Discharge Planning   ZEKE: 5/3/2024     Code Status: Full Code   Is the patient medically ready for discharge?: No    Reason for patient still in hospital (select all that apply): Treatment and Consult recommendations  Discharge Plan A: Skilled Nursing Facility                  Molly Lopez MD  Department of Hospital Medicine   [facility] - Telemetry Stepdown

## 2024-04-29 NOTE — CONSULTS
Interventional Radiology   Consult Note      Date: 4/29/2024   Primary team: [facility] MED Georgina Rodriguez Jasmine, MD   Room/bed: 8092/8092 A    Inpatient consult to Interventional Radiology  Consult performed by: Roselyn Valderrama PA-C  Consult ordered by: Molly Lopez MD             History of Present Illness:  Sarah Saravia is a 53 y.o. 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 She had an IR placed TDC on 2/29/24. Noted at dialysis that catheter requires cathflo and heparin prior to use. IR consulted for evaluation. No imaging obtained to assess.     ROS:   Review of Systems   Reason unable to perform ROS: mental status.          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    Hypermagnesemia 04/11/2024    POA, Mg 3.2  Daily chem       Morbid obesity     Necrotizing fasciitis        Past Surgical History:  Past Surgical History:   Procedure Laterality Date    CLOSURE OF WOUND Right 2/22/2024    Procedure: CLOSURE, WOUND;  Surgeon: Steve Cole MD;  Location: [address];  Service: General;  Laterality: Right;    ESOPHAGOGASTRODUODENOSCOPY W/ PEG N/A 2/22/2024    Procedure: EGD, WITH PEG TUBE INSERTION;  Surgeon: Steve Cole MD;  Location: [address];  Service: General;  Laterality: N/A;    INCISION AND DRAINAGE OF PERIRECTAL REGION N/A 1/29/2024    Procedure: INCISION AND DRAINAGE, PERIRECTAL REGION;  Surgeon: Axel Ramsay MD;  Location: [facility] OR;  Service: General;  Laterality: N/A;    LUMBAR PUNCTURE N/A 2/16/2024    Procedure: Lumbar Puncture;  Surgeon: Macy Boykin;  Location: [facility];  Service: Anesthesiology;  Laterality: N/A;    PLACEMENT, TRIALYSIS CATH Right 1/31/2024    Procedure: INSERTION, CATHETER, TRIPLE LUMEN, HEMODIALYSIS, TEMPORARY;  Surgeon: Alvin Junior MD;   Location: [facility] OR;  Service: General;  Laterality: Right;    REPLACEMENT OF WOUND VACUUM-ASSISTED CLOSURE DEVICE Right 2/12/2024    Procedure: REPLACEMENT, WOUND VAC;  Surgeon: Mundo Carmona MD;  Location: [facility] OR 2ND FLR;  Service: General;  Laterality: Right;    REPLACEMENT OF WOUND VACUUM-ASSISTED CLOSURE DEVICE N/A 2/15/2024    Procedure: REPLACEMENT, WOUND VAC;  Surgeon: Steve Cole MD;  Location: [facility] OR 2ND FLR;  Service: General;  Laterality: N/A;    REPLACEMENT OF WOUND VACUUM-ASSISTED CLOSURE DEVICE Right 2/19/2024    Procedure: REPLACEMENT, WOUND VAC;  Surgeon: Steve Cole MD;  Location: [facility] OR 2ND FLR;  Service: General;  Laterality: Right;  RLE/groin    REPLACEMENT OF WOUND VACUUM-ASSISTED CLOSURE DEVICE Right 2/22/2024    Procedure: REPLACEMENT, WOUND VAC;  Surgeon: Steve Cole MD;  Location: [facility] OR 2ND FLR;  Service: General;  Laterality: Right;    TRACHEOSTOMY N/A 2/22/2024    Procedure: CREATION, TRACHEOSTOMY;  Surgeon: Steve Cole MD;  [facility];  Service: General;  Laterality: N/A;    WOUND DEBRIDEMENT Bilateral 2/2/2024    Procedure: DEBRIDEMENT, WOUND;  Surgeon: Steve Cole MD;  [facility];  Service: General;  Laterality: Bilateral;  Bilateral groin  Possible wound vac placement    WOUND DEBRIDEMENT Right 2/6/2024    Procedure: DEBRIDEMENT, WOUND, replace wound vac, possible closure;  Surgeon: Steve Cole MD;  [facility];  Service: General;  Laterality: Right;  RLE    WOUND DEBRIDEMENT Right 2/9/2024    Procedure: DEBRIDEMENT, WOUND w wound vac change;  Surgeon: Steve Cole MD;  [facility];  Service: General;  Laterality: Right;  RLE    WOUND DEBRIDEMENT Right 2/12/2024    Procedure: R thigh wound debridement;  Surgeon: Mundo Carmona MD;  Location: [facility] OR 2ND FLR;  Service: General;  Laterality: Right;    WOUND EXPLORATION Right 1/31/2024    Procedure: IRRIGATION & DEBRIDEMENT, WOUND DEBRIDEMENT;   Surgeon: Alvin Junior MD;  Location: [facility] OR;  Service: General;  Laterality: Right;        Sedation History:    No known adverse reactions.     Social History:  Social History     Tobacco Use    Smoking status: Unknown        Home Medications:   Prior to Admission medications    Medication Sig Start Date End Date Taking? Authorizing Provider   ascorbic acid, vitamin C, (VITAMIN C) 500 MG tablet 500 mg by Per G Tube route 2 (two) times daily.   Yes Provider, Historical   chlorhexidine (PERIDEX) 0.12 % solution Use as directed 15 mLs in the mouth or throat 2 (two) times daily.   Yes Provider, Historical   pantoprazole sodium (PANTOPRAZOLE ORAL) 40 mg once daily. Liquid via gtube   Yes Provider, Historical   psyllium (KONSYL) Powd 1 packet by Per G Tube route once daily.   Yes Provider, Historical   zinc sulfate (ZINCATE) 50 mg zinc (220 mg) capsule 220 mg by Per G Tube route once daily.   Yes Provider, Historical   amLODIPine (NORVASC) 10 MG tablet 1 tablet (10 mg total) by Per G Tube route once daily. Hold until follow up with primary care physician 4/17/24   Adi Aguirre MD   aspirin 81 MG Chew 1 tablet (81 mg total) by Per G Tube route once daily. 4/17/24 4/17/25  Adi Aguirre MD   carvediloL (COREG) 25 MG tablet 1 tablet (25 mg total) by Per G Tube route 2 (two) times daily. Hold until follow up with primary care physician 4/17/24   Adi Aguirre MD   insulin aspart U-100 (NOVOLOG) 100 unit/mL (3 mL) InPn pen Inject 4 Units into the skin every 6 (six) hours. 4/17/24 4/17/25  Adi Aguirre MD   insulin detemir U-100, Levemir, 100 unit/mL (3 mL) SubQ InPn pen Inject 27 Units into the skin 2 (two) times daily. 4/17/24 4/17/25  Adi Aguirre MD   polyethylene glycol (GLYCOLAX) 17 gram PwPk 17 g by Per G Tube route once daily. 4/17/24   Adi Aguirre MD   sevelamer carbonate (RENVELA) 0.8 gram PwPk 1 packet (0.8 g total) by Per G Tube route 3 (three) times daily. 4/17/24 4/17/25  Adi Aguirre MD       Inpatient  Medications:    Current Facility-Administered Medications:     ascorbic acid (vitamin C) tablet 500 mg, 500 mg, Per G Tube, BID, Kristopher Tyler DO, 500 mg at 04/28/24 2143    aspirin chewable tablet 81 mg, 81 mg, Per G Tube, Daily, Adi Aguirre MD, 81 mg at 04/28/24 0931    atorvastatin tablet 40 mg, 40 mg, Per G Tube, Daily, Osito Dos Santos MD, 40 mg at 04/28/24 0932    dextrose 10 % infusion, , Intravenous, Continuous PRN, Adi Aguirre MD    dextrose 10% bolus 125 mL 125 mL, 12.5 g, Intravenous, PRN, Kristopher Tyler DO    dextrose 10% bolus 250 mL 250 mL, 25 g, Intravenous, PRN, Kristopher Tyler DO    epoetin merry injection 6,100 Units, 50 Units/kg, Intravenous, Every Mon, Wed, Fri, Shwetha Gregory, POONAM, FNP-C, 6,100 Units at 04/29/24 1200    glucagon (human recombinant) injection 1 mg, 1 mg, Intramuscular, PRN, Kristopher Tyler DO    glucose chewable tablet 16 g, 16 g, Oral, PRN, Kristopher Tyler DO    glucose chewable tablet 24 g, 24 g, Oral, PRN, Kristopher Tyler DO    heparin (porcine) injection 1,000 Units, 1,000 Units, Intra-Catheter, PRN, Shwetha Gregory DNP, FNP-C, 1,000 Units at 04/26/24 1205    heparin (porcine) injection 3,000 Units, 3,000 Units, Intravenous, PRN, Delfina Shi DNP, 3,000 Units at 04/29/24 0958    heparin (porcine) injection 7,500 Units, 7,500 Units, Subcutaneous, Q8H, Kristopher Tyler DO, 7,500 Units at 04/29/24 0543    insulin aspart U-100 pen 0-10 Units, 0-10 Units, Subcutaneous, Q6H PRN, Hola Liriano MD, 2 Units at 04/28/24 1830    insulin aspart U-100 pen 4 Units, 4 Units, Subcutaneous, Q6H, Hola Liriano MD, 4 Units at 04/29/24 0544    insulin detemir U-100 (Levemir) pen 27 Units, 27 Units, Subcutaneous, BID, Adi Aguirre MD, 27 Units at 04/28/24 2144    metoprolol tartrate (LOPRESSOR) tablet 25 mg, 25 mg, Per G Tube, BID, Osito Dos Santos MD, 25 mg at 04/28/24 2143    naloxone 0.4 mg/mL injection 0.02 mg, 0.02 mg, Intravenous, PRN, Kristopher Tyler DO    ondansetron  injection 4 mg, 4 mg, Intravenous, Q6H PRN, Hola Liriano MD    pantoprazole suspension 40 mg, 40 mg, Per G Tube, Daily, Kristopher Tyler DO, 40 mg at 04/28/24 0932    polyethylene glycol packet 17 g, 17 g, Per G Tube, BID, Adi Aguirre MD, 17 g at 04/28/24 2144    psyllium husk (aspartame) 3.4 gram packet 1 packet, 1 packet, Per G Tube, Daily, Kristopher Tyler DO, 1 packet at 04/28/24 0931    senna-docusate 8.6-50 mg per tablet 1 tablet, 1 tablet, Per G Tube, BID, Chandrika Sadler MD, 1 tablet at 04/28/24 2143    sevelamer carbonate pwpk 0.8 g, 0.8 g, Per G Tube, TID, Hola Liriano MD, 0.8 g at 04/28/24 2145    sodium chloride 0.9% flush 10 mL, 10 mL, Intravenous, Q12H PRN, Kristopher Tyler DO    zinc sulfate capsule 220 mg, 220 mg, Per G Tube, Daily, Kristopher Tyler DO, 220 mg at 04/28/24 0931     Anticoagulants/Antiplatelets:   ASA 81 mg     Allergies:   Review of patient's allergies indicates:  No Known Allergies    Vital Signs:  Temp: 97.4 °F (36.3 °C) (04/29/24 0733)  Pulse: 107 (04/29/24 1351)  Resp: 18 (04/29/24 0755)  BP: 122/76 (04/29/24 1330)  SpO2: 98 % (04/29/24 1351)    Temp:  [97.4 °F (36.3 °C)-98.7 °F (37.1 °C)]   Pulse:  []   Resp:  [16-19]   BP: (103-192)/(63-93)   SpO2:  [93 %-100 %]      Physical Exam:   Physical Exam  Constitutional:       General: She is not in acute distress.     Appearance: She is obese.   HENT:      Head: Normocephalic.   Cardiovascular:      Rate and Rhythm: Normal rate and regular rhythm.   Pulmonary:      Comments: Trach in place  Abdominal:      Comments: obese   Skin:     Comments: R chest: TDC in place   Neurological:      Mental Status: Mental status is at baseline.      Comments: Non verbal            Sedation Exam:  ASA: III - Patient appears to have severe systemic disease not posing a constant threat to life  Mallampati score: trach in place    Laboratory:  Lab Results   Component Value Date    INR 1.0 02/01/2024       Lab Results   Component Value Date     WBC 11.27 04/28/2024    HGB 9.8 (L) 04/28/2024    HCT 31.0 (L) 04/28/2024    MCV 86 04/28/2024     04/28/2024      Lab Results   Component Value Date     (H) 04/29/2024     (L) 04/29/2024    K 4.1 04/29/2024    CL 91 (L) 04/29/2024    CO2 20 (L) 04/29/2024    BUN 75 (H) 04/29/2024    CREATININE 7.1 (H) 04/29/2024    CALCIUM 10.6 (H) 04/29/2024    MG 2.3 04/29/2024    ALT 28 04/25/2024    AST 22 04/25/2024    ALBUMIN 3.2 (L) 04/29/2024    BILITOT 0.3 04/25/2024    BILIDIR 0.1 04/10/2024       ASSESSMENT/PLAN/RECOMMENDATIONS:   Sarah Saravia is a 53 y.o. 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 She had an IR placed TDC on 2/29/24. Noted at dialysis that catheter requires cathflo and heparin prior to use. IR consulted for evaluation. No imaging obtained to assess. Will bring in tomorrow for a venogram and possible exchange of TDC.     Plan:  Sedation Plan: up to moderate  Patient will undergo: venogram with possible exchange of TDC 04/30/24  NPO @ midnight     Roselyn Valderrama PA-C  Interventional Radiology  Spectra: [id number]  4/29/2024

## 2024-04-29 NOTE — ASSESSMENT & PLAN NOTE
Creatine stable for now. BMP reviewed- noted Estimated Creatinine Clearance: 12.4 mL/min (A) (based on SCr of 7.1 mg/dL (H)). according to latest data. Based on current GFR, CKD stage is end stage.  Monitor UOP and serial BMP and adjust therapy as needed. Renally dose meds. Avoid nephrotoxic medications and procedures.    -- HD MWF  -- nephro following  -- sevelamer TID

## 2024-04-29 NOTE — PROGRESS NOTES
[facility] NEPHROLOGY STAFF HEMODIALYSIS NOTE     Patient currently on hemodialysis for removal of uremic toxins and volume.     Patient seen and evaluated on hemodialysis, tolerating treatment, see HD flowsheet for vitals and assessments.    Labs have been reviewed and the dialysate bath has been adjusted.       Assessment/Plan:    -Pending placement limited due to trach-  capping trial per pulmonary   -Seen on HD arterial alarms and unable to reverse lines  - plan got cathflo   -Requiring cathflo prior to treatments, low BFRs recommend consult IR to evaluate TDC   -UF goal of 1L  -Renal diet, if not NPO   -Strict I/O's and daily weights  -Daily renal function panels  -Keep MAP >65 while on HD   -Hgb goal 10-11  -continue sevelamer 0.8 TID  -check PTH with next labs   -Will continue to follow while inpatient     Delfina Shi DNP-FNP, C  Nephrology  Pager: [phone]

## 2024-04-29 NOTE — ASSESSMENT & PLAN NOTE
Patient's FSGs are controlled on current medication regimen.  Last A1c reviewed-   Lab Results   Component Value Date    HGBA1C 10.4 (H) 01/30/2024     Most recent fingerstick glucose reviewed-   Recent Labs   Lab 04/28/24  1827 04/29/24  0046 04/29/24  0546   POCTGLUCOSE 163* 126* 156*       Current correctional scale  Medium  Maintain anti-hyperglycemic dose as follows-   Antihyperglycemics (From admission, onward)    Start     Stop Route Frequency Ordered    04/17/24 1200  insulin aspart U-100 pen 4 Units         -- SubQ Every 6 hours 04/17/24 0938    04/17/24 0944  insulin aspart U-100 pen 0-10 Units         -- SubQ Every 6 hours PRN 04/17/24 0938    04/13/24 2100  insulin detemir U-100 (Levemir) pen 27 Units         -- SubQ 2 times daily 04/13/24 0931        Hold Oral hypoglycemics while patient is in the hospital.  Aspart 3u q4h  Levemir 27u BID  Anticipate discharge with above regimen given well controlled blood glucose while at goal TF rate  MDSSI  POCT glucose q4h, please give remaining sliding scale requirement if above the scheduled 3u  Ordered current regimen with elevation in glucose

## 2024-04-29 NOTE — SUBJECTIVE & OBJECTIVE
Interval History: NAEO. Ongoing trach capping trial. Not interactive this morning.     Objective:     Vital Signs (Most Recent):  Temp: 97.4 °F (36.3 °C) (04/29/24 0733)  Pulse: 100 (04/29/24 1245)  Resp: 18 (04/29/24 0755)  BP: (!) 141/86 (04/29/24 1245)  SpO2: 100 % (04/29/24 1215) Vital Signs (24h Range):  Temp:  [97.4 °F (36.3 °C)-98.7 °F (37.1 °C)] 97.4 °F (36.3 °C)  Pulse:  [] 100  Resp:  [16-19] 18  SpO2:  [93 %-100 %] 100 %  BP: (103-192)/(63-93) 141/86     Weight: 122.5 kg (270 lb 1 oz)  Body mass index is 42.3 kg/m².    Intake/Output Summary (Last 24 hours) at 4/29/2024 1259  Last data filed at 4/29/2024 0611  Gross per 24 hour   Intake 280 ml   Output 0 ml   Net 280 ml         Physical Exam  Vitals and nursing note reviewed.   Constitutional:       General: She is not in acute distress.     Appearance: She is not ill-appearing.   HENT:      Head: Normocephalic and atraumatic.      Mouth/Throat:      Mouth: Mucous membranes are moist.   Eyes:      General: No scleral icterus.     Extraocular Movements: Extraocular movements intact.   Neck:      Comments: Trach  Cardiovascular:      Rate and Rhythm: Normal rate and regular rhythm.      Heart sounds: Normal heart sounds.   Pulmonary:      Effort: Pulmonary effort is normal. No respiratory distress.      Breath sounds: Normal breath sounds.   Abdominal:      General: Abdomen is flat. There is no distension.      Palpations: Abdomen is soft.      Tenderness: There is no abdominal tenderness.      Comments: PEG tube   Musculoskeletal:         General: No swelling or tenderness.      Right lower leg: No edema.      Left lower leg: No edema.      Comments: Moves UEs spontaneously   Skin:     General: Skin is warm and dry.      Findings: Wound present.   Neurological:      General: No focal deficit present.      Mental Status: She is alert.      Comments: Nonverbal, not following commands             Significant Labs: All pertinent labs within the past 24  hours have been reviewed.    Significant Imaging: I have reviewed all pertinent imaging results/findings within the past 24 hours.

## 2024-04-29 NOTE — PROGRESS NOTES
3.5 hour dialysis complete.  Blood returned.  [facility] flushed, locked with cathflo, capped, medication label and covered with gauze.    Net UF 1L.  Epo given.  Tolerated well.    Transported from [facility] to room 8092 via bed by transporter.

## 2024-04-29 NOTE — PT/OT/SLP PROGRESS
Physical Therapy      Patient Name:  Sarah Saravia   MRN:  [id number]    Patient not seen in AM secondary to patient off floor at Dialysis. PM attempt at 1453, however treatment session not completed secondary to patient falling asleep during PT-patient interactions. Writing therapist repositioned patient so that trunk was in neutral positioning vs. strong lean to R. Will follow-up at next appropriate date.    Patient Education Provided on:  The role of physical therapy and how the patient can benefit from skilled services  The negative effects of prolonged bed rest/sedentary behavior, along with the importance of OOB activity & patient participation with PT  Pt white board updated with current therapists name and level of mobility assistance needed    Time In: 1453  Time Out: 1507  Total Time: 14 mins (Non-Billable)

## 2024-04-29 NOTE — PROGRESS NOTES
"Dialysis started to [facility].      Arterial "sucking down".  Unable to reverse lines.    RUBY Shi NP notified.      Tx stopped.   Blood returned.    Cathflo instilled in both lumens to dwell x 1 hour.    Contact isolation precautions maintained.  "

## 2024-04-29 NOTE — H&P
Please see IR consult note dated 4/29/2024    Roselyn Valderrama PA-C  Interventional Radiology  Spectra [phone]  4/29/2024

## 2024-04-30 LAB
BASOPHILS # BLD AUTO: 0.05 K/UL (ref 0–0.2)
BASOPHILS NFR BLD: 0.6 % (ref 0–1.9)
DIFFERENTIAL METHOD BLD: ABNORMAL
EOSINOPHIL # BLD AUTO: 0.3 K/UL (ref 0–0.5)
EOSINOPHIL NFR BLD: 4.3 % (ref 0–8)
ERYTHROCYTE [DISTWIDTH] IN BLOOD BY AUTOMATED COUNT: 14.9 % (ref 11.5–14.5)
HCG INTACT+B SERPL-ACNC: 4.1 MIU/ML
HCT VFR BLD AUTO: 33.2 % (ref 37–48.5)
HGB BLD-MCNC: 10 G/DL (ref 12–16)
IMM GRANULOCYTES # BLD AUTO: 0.03 K/UL (ref 0–0.04)
IMM GRANULOCYTES NFR BLD AUTO: 0.4 % (ref 0–0.5)
LYMPHOCYTES # BLD AUTO: 2.6 K/UL (ref 1–4.8)
LYMPHOCYTES NFR BLD: 33.7 % (ref 18–48)
MCH RBC QN AUTO: 27.2 PG (ref 27–31)
MCHC RBC AUTO-ENTMCNC: 30.1 G/DL (ref 32–36)
MCV RBC AUTO: 90 FL (ref 82–98)
MONOCYTES # BLD AUTO: 0.8 K/UL (ref 0.3–1)
MONOCYTES NFR BLD: 10.5 % (ref 4–15)
NEUTROPHILS # BLD AUTO: 4 K/UL (ref 1.8–7.7)
NEUTROPHILS NFR BLD: 50.5 % (ref 38–73)
NRBC BLD-RTO: 0 /100 WBC
PLATELET # BLD AUTO: 284 K/UL (ref 150–450)
PMV BLD AUTO: 9.6 FL (ref 9.2–12.9)
POCT GLUCOSE: 121 MG/DL (ref 70–110)
POCT GLUCOSE: 126 MG/DL (ref 70–110)
POCT GLUCOSE: 127 MG/DL (ref 70–110)
POCT GLUCOSE: 129 MG/DL (ref 70–110)
POCT GLUCOSE: 149 MG/DL (ref 70–110)
POCT GLUCOSE: 152 MG/DL (ref 70–110)
RBC # BLD AUTO: 3.68 M/UL (ref 4–5.4)
WBC # BLD AUTO: 7.83 K/UL (ref 3.9–12.7)

## 2024-04-30 PROCEDURE — C1751 CATH, INF, PER/CENT/MIDLINE: HCPCS

## 2024-04-30 PROCEDURE — 94761 N-INVAS EAR/PLS OXIMETRY MLT: CPT

## 2024-04-30 PROCEDURE — 63600175 PHARM REV CODE 636 W HCPCS: Performed by: INTERNAL MEDICINE

## 2024-04-30 PROCEDURE — 85025 COMPLETE CBC W/AUTO DIFF WBC: CPT

## 2024-04-30 PROCEDURE — 97535 SELF CARE MNGMENT TRAINING: CPT | Mod: CO

## 2024-04-30 PROCEDURE — 36415 COLL VENOUS BLD VENIPUNCTURE: CPT

## 2024-04-30 PROCEDURE — 36415 COLL VENOUS BLD VENIPUNCTURE: CPT | Performed by: STUDENT IN AN ORGANIZED HEALTH CARE EDUCATION/TRAINING PROGRAM

## 2024-04-30 PROCEDURE — 27000207 HC ISOLATION

## 2024-04-30 PROCEDURE — 25000003 PHARM REV CODE 250: Performed by: STUDENT IN AN ORGANIZED HEALTH CARE EDUCATION/TRAINING PROGRAM

## 2024-04-30 PROCEDURE — 84702 CHORIONIC GONADOTROPIN TEST: CPT | Performed by: STUDENT IN AN ORGANIZED HEALTH CARE EDUCATION/TRAINING PROGRAM

## 2024-04-30 PROCEDURE — 0J2TXYZ CHANGE OTHER DEVICE IN TRUNK SUBCUTANEOUS TISSUE AND FASCIA, EXTERNAL APPROACH: ICD-10-PCS | Performed by: STUDENT IN AN ORGANIZED HEALTH CARE EDUCATION/TRAINING PROGRAM

## 2024-04-30 PROCEDURE — 97530 THERAPEUTIC ACTIVITIES: CPT

## 2024-04-30 PROCEDURE — 25000003 PHARM REV CODE 250

## 2024-04-30 PROCEDURE — 25000242 PHARM REV CODE 250 ALT 637 W/ HCPCS

## 2024-04-30 PROCEDURE — 99233 SBSQ HOSP IP/OBS HIGH 50: CPT | Mod: ,,, | Performed by: INTERNAL MEDICINE

## 2024-04-30 PROCEDURE — 20600001 HC STEP DOWN PRIVATE ROOM

## 2024-04-30 PROCEDURE — 99900035 HC TECH TIME PER 15 MIN (STAT)

## 2024-04-30 PROCEDURE — 97112 NEUROMUSCULAR REEDUCATION: CPT

## 2024-04-30 PROCEDURE — 76937 US GUIDE VASCULAR ACCESS: CPT

## 2024-04-30 PROCEDURE — 63600175 PHARM REV CODE 636 W HCPCS

## 2024-04-30 PROCEDURE — 25000003 PHARM REV CODE 250: Performed by: INTERNAL MEDICINE

## 2024-04-30 PROCEDURE — 97530 THERAPEUTIC ACTIVITIES: CPT | Mod: CO

## 2024-04-30 PROCEDURE — 36410 VNPNXR 3YR/> PHY/QHP DX/THER: CPT

## 2024-04-30 RX ORDER — LIDOCAINE HYDROCHLORIDE 5 MG/ML
INJECTION, SOLUTION INFILTRATION; INTRAVENOUS
Status: COMPLETED | OUTPATIENT
Start: 2024-04-30 | End: 2024-04-30

## 2024-04-30 RX ORDER — FENTANYL CITRATE 50 UG/ML
INJECTION, SOLUTION INTRAMUSCULAR; INTRAVENOUS
Status: COMPLETED | OUTPATIENT
Start: 2024-04-30 | End: 2024-04-30

## 2024-04-30 RX ADMIN — POLYETHYLENE GLYCOL 3350 17 G: 17 POWDER, FOR SOLUTION ORAL at 08:04

## 2024-04-30 RX ADMIN — POLYETHYLENE GLYCOL 3350 17 G: 17 POWDER, FOR SOLUTION ORAL at 09:04

## 2024-04-30 RX ADMIN — FENTANYL CITRATE 50 MCG: 50 INJECTION, SOLUTION INTRAMUSCULAR; INTRAVENOUS at 04:04

## 2024-04-30 RX ADMIN — ASPIRIN 81 MG CHEWABLE TABLET 81 MG: 81 TABLET CHEWABLE at 08:04

## 2024-04-30 RX ADMIN — DOCUSATE SODIUM AND SENNOSIDES 1 TABLET: 8.6; 5 TABLET, FILM COATED ORAL at 09:04

## 2024-04-30 RX ADMIN — Medication 500 MG: at 08:04

## 2024-04-30 RX ADMIN — INSULIN ASPART 4 UNITS: 100 INJECTION, SOLUTION INTRAVENOUS; SUBCUTANEOUS at 05:04

## 2024-04-30 RX ADMIN — ATORVASTATIN CALCIUM 40 MG: 40 TABLET, FILM COATED ORAL at 08:04

## 2024-04-30 RX ADMIN — SEVELAMER CARBONATE 0.8 G: 2400 POWDER, FOR SUSPENSION ORAL at 09:04

## 2024-04-30 RX ADMIN — HEPARIN SODIUM 7500 UNITS: 5000 INJECTION INTRAVENOUS; SUBCUTANEOUS at 02:04

## 2024-04-30 RX ADMIN — PSYLLIUM HUSK 1 PACKET: 3.4 POWDER ORAL at 09:04

## 2024-04-30 RX ADMIN — INSULIN DETEMIR 27 UNITS: 100 INJECTION, SOLUTION SUBCUTANEOUS at 09:04

## 2024-04-30 RX ADMIN — FENTANYL CITRATE 25 MCG: 50 INJECTION, SOLUTION INTRAMUSCULAR; INTRAVENOUS at 05:04

## 2024-04-30 RX ADMIN — HEPARIN SODIUM 7500 UNITS: 5000 INJECTION INTRAVENOUS; SUBCUTANEOUS at 05:04

## 2024-04-30 RX ADMIN — DOCUSATE SODIUM AND SENNOSIDES 1 TABLET: 8.6; 5 TABLET, FILM COATED ORAL at 08:04

## 2024-04-30 RX ADMIN — INSULIN ASPART 4 UNITS: 100 INJECTION, SOLUTION INTRAVENOUS; SUBCUTANEOUS at 12:04

## 2024-04-30 RX ADMIN — INSULIN DETEMIR 27 UNITS: 100 INJECTION, SOLUTION SUBCUTANEOUS at 08:04

## 2024-04-30 RX ADMIN — METOPROLOL TARTRATE 25 MG: 25 TABLET, FILM COATED ORAL at 08:04

## 2024-04-30 RX ADMIN — SEVELAMER CARBONATE 0.8 G: 2400 POWDER, FOR SUSPENSION ORAL at 03:04

## 2024-04-30 RX ADMIN — Medication 500 MG: at 09:04

## 2024-04-30 RX ADMIN — LIDOCAINE HYDROCHLORIDE 4 MG: 5 INJECTION, SOLUTION INFILTRATION; INTRAVENOUS at 04:04

## 2024-04-30 RX ADMIN — SEVELAMER CARBONATE 0.8 G: 2400 POWDER, FOR SUSPENSION ORAL at 08:04

## 2024-04-30 RX ADMIN — HEPARIN SODIUM 7500 UNITS: 5000 INJECTION INTRAVENOUS; SUBCUTANEOUS at 09:04

## 2024-04-30 RX ADMIN — METOPROLOL TARTRATE 25 MG: 25 TABLET, FILM COATED ORAL at 09:04

## 2024-04-30 RX ADMIN — ZINC SULFATE 220 MG (50 MG) CAPSULE 220 MG: CAPSULE at 08:04

## 2024-04-30 RX ADMIN — INSULIN ASPART 2 UNITS: 100 INJECTION, SOLUTION INTRAVENOUS; SUBCUTANEOUS at 12:04

## 2024-04-30 RX ADMIN — PANTOPRAZOLE SODIUM 40 MG: 40 GRANULE, DELAYED RELEASE ORAL at 08:04

## 2024-04-30 NOTE — PLAN OF CARE
Problem: Physical Therapy  Goal: Physical Therapy Goal  Description: Goals to be met by: 24     Patient will increase functional independence with mobility by performin. Supine to sit with Maximum Assistance  2. Sit to supine with Maximum Assistance  3. Rolling to Left and Right with Moderate Assistance.  4. Sitting at edge of bed 5 minutes with Moderate Assistance- MET , monitor consistency  5. Lower extremity exercise program x20 reps per handout, with assistance as needed    Outcome: Progressing

## 2024-04-30 NOTE — PT/OT/SLP PROGRESS
Occupational Therapy   Treatment  A client care conference was completed by the OTR and the SUTTON prior to treatment by the OTR to discuss the patient's POC and current status.     Rehab tech present during session to assist SUTTON  Name: Sarah Saravia  MRN: [id number]  Admitting Diagnosis:  Acute cystitis with hematuria       Recommendations:     Discharge Recommendations: Moderate Intensity Therapy  Discharge Equipment Recommendations:  to be determined by next level of care  Barriers to discharge:  Other (Comment) (requires increased assistance)    Assessment:     Sarah Saravia is a 53 y.o. female with a medical diagnosis of Acute cystitis with hematuria.  She presents with the following performance deficits affecting function are weakness, impaired self care skills, impaired balance, decreased ROM, decreased safety awareness, impaired cognition, gait instability, decreased upper extremity function, decreased lower extremity function, impaired functional mobility, impaired endurance.  Pt did not follow simple commands nor responded to simple yes or no questions. She required 2-person assistance for bed mobility and self-care tasks including perineal hygiene, UB dressing and grooming tasks.     Rehab Prognosis:  Fair; patient would benefit from acute skilled OT services to address these deficits and reach maximum level of function.       Plan:     Patient to be seen 3 x/week to address the above listed problems via self-care/home management, therapeutic activities, therapeutic exercises, neuromuscular re-education  Plan of Care Expires: 05/22/24  Plan of Care Reviewed with: patient    Subjective     Chief Complaint: none stated  Patient/Family Comments/goals: None stated (pt s/p tracheostomy)  Pain/Comfort:  Pain Rating 1:  (no rating provided)    Objective:     Communicated with: nurse (Jazmín) prior to session.  Patient found supine HOB elevated with PEG Tube, pressure relief boots, Tracheostomy, telemetry,  pulse ox (continuous) upon OT entry to room. Pt soiled from bowel movement upon arrival in room.    General Precautions: Standard, aphasia, aspiration, fall, NPO, contact    Orthopedic Precautions:N/A  Braces: N/A  Respiratory Status: Room air     Occupational Performance:     Bed Mobility:  max verbal cues provided by therapist for technique and pt not following simple commands  Patient completed Rolling/Turning to Left with  total assistance x 2 persons  Therapist attempted to guide pt's UE to hold rail and pt not able to do so as she attempted to hold therapist's arm  Patient completed Rolling/Turning to Right with total assistance, 2 persons, and with side rail  Patient completed Scooting/Bridging with total assistance, 2 persons, and bed positioned in trendelenburg     Functional Mobility/Transfers:  Not assessed this date    Activities of Daily Living: increased time for completion 2/2 pt soiled from BM  Grooming: total assistance to wipe face with washcloth in supine with HOB elevated. Therapist prompted to pt to retrieve towel from therapist's hand and pt did not follow command  Upper Body Dressing: total assistance and of 2 persons to doff and don soiled gown in supine   Toileting: total assistance as pt soiled from bowel movement. Total A x 2 persons for completion of belgica-hygiene      [facility] 6 Click ADL: 6    Treatment & Education:  Pt performed bed mobility, functional mobility/transfers, and ADLs as documented above.   Pt educated and instructed on the following:  OT POC  Role of SUTTON  Use of call bell for all assistance  Addressed questions and /or concerns within SUTTON scope of practice  Pt verbalized understanding     Patient left supine HOB elevated ~45* degrees with all lines intact, call button in reach, nurse notified,  present, and pt appears in NAD.     GOALS:   Multidisciplinary Problems       Occupational Therapy Goals          Problem: Occupational Therapy    Goal Priority Disciplines  Outcome Interventions   Occupational Therapy Goal     OT, PT/OT Progressing    Description: Goals to be met by: 5/22/24     Patient will increase functional independence with ADLs by performing:    UE Dressing with Maximum Assistance.  Grooming while EOB with Maximum Assistance.  Sitting at edge of bed x5 minutes with Maximum Assistance.  Rolling to Bilateral with Moderate Assistance.   Supine to sit with Maximum Assistance.                         Time Tracking:     OT Date of Treatment: 04/30/24  OT Start Time: 1043  OT Stop Time: 1106  OT Total Time (min): 23 min    Billable Minutes:Self Care/Home Management 13  Therapeutic Activity 10    OT/JOSÉ: JOSÉ     Number of JOSÉ visits since last OT visit: 1    4/30/2024

## 2024-04-30 NOTE — SUBJECTIVE & OBJECTIVE
Interval History: No changes overnight      Objective:     Vital Signs (Most Recent):  Temp: 98.8 °F (37.1 °C) (04/30/24 1108)  Pulse: 94 (04/30/24 1108)  Resp: 19 (04/30/24 1108)  BP: 119/76 (04/30/24 1108)  SpO2: 95 % (04/30/24 1108) Vital Signs (24h Range):  Temp:  [98.3 °F (36.8 °C)-99.9 °F (37.7 °C)] 98.8 °F (37.1 °C)  Pulse:  [] 94  Resp:  [16-20] 19  SpO2:  [95 %-100 %] 95 %  BP: (111-131)/(74-79) 119/76     Weight: 122.5 kg (270 lb 1 oz)  Body mass index is 42.3 kg/m².      Intake/Output Summary (Last 24 hours) at 4/30/2024 1254  Last data filed at 4/29/2024 1330  Gross per 24 hour   Intake 600 ml   Output 1950 ml   Net -1350 ml        Physical Exam  Vitals and nursing note reviewed.   HENT:      Head: Normocephalic and atraumatic.   Neck:      Comments: Trach removed  Cardiovascular:      Rate and Rhythm: Normal rate and regular rhythm.      Heart sounds: Normal heart sounds.   Pulmonary:      Effort: Pulmonary effort is normal. No respiratory distress.      Breath sounds: Normal breath sounds.   Abdominal:      General: Abdomen is flat. There is no distension.      Palpations: Abdomen is soft.      Tenderness: There is no abdominal tenderness.      Comments: PEG tube   Musculoskeletal:      Right lower leg: No edema.      Left lower leg: No edema.      Comments: Moves UEs spontaneously   Skin:     General: Skin is warm and dry.      Findings: Wound present.   Neurological:      General: No focal deficit present.      Mental Status: She is alert.      Comments: Nonverbal, not following commands           Review of Systems    Vents:  Oxygen Concentration (%): 21 (04/26/24 0559)    Lines/Drains/Airways       Central Venous Catheter Line  Duration                  Hemodialysis Catheter 02/29/24 1528 right internal jugular 60 days              Drain  Duration                  Gastrostomy/Enterostomy 02/22/24 1200 LUQ 67 days              Airway  Duration             Adult Surgical Airway 03/13/24 1015  Tyron Uncuffed 6.0/ 75mm 48 days              Peripheral Intravenous Line  Duration                  Peripheral IV - Single Lumen 04/28/24 0633 20 G Left Antecubital 2 days                    Significant Labs:    CBC/Anemia Profile:  Recent Labs   Lab 04/28/24  1343 04/30/24  0611   WBC 11.27 7.83   HGB 9.8* 10.0*   HCT 31.0* 33.2*    284   MCV 86 90   RDW 14.8* 14.9*        Chemistries:  Recent Labs   Lab 04/29/24  0445   *   K 4.1   CL 91*   CO2 20*   BUN 75*   CREATININE 7.1*   CALCIUM 10.6*   ALBUMIN 3.2*   MG 2.3   PHOS 6.1*       All pertinent labs within the past 24 hours have been reviewed.    Significant Imaging:  I have reviewed all pertinent imaging results/findings within the past 24 hours.

## 2024-04-30 NOTE — ASSESSMENT & PLAN NOTE
Tracheostomy removed without difficulty.  - cover with gauze dressing and tape  - change daily  - no further recommendations

## 2024-04-30 NOTE — RESPIRATORY THERAPY
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  Clinically Significant Surgical Hx: tracheostomy    24 Hours Vitals Range:  Temp:  [98.3 °F (36.8 °C)-99.9 °F (37.7 °C)]   Pulse:  []   Resp:  [16-20]   BP: (111-142)/(65-86)   SpO2:  [95 %-100 %]     Labs:    Recent Labs     24  0445   *   K 4.1   CL 91*   CO2 20*   BUN 75*   CREATININE 7.1*   *   PHOS 6.1*   MG 2.3        No results for input(s): "PH", "PCO2", "PO2", "HCO3", "POCSATURATED", "BE" in the last 72 hours.    ASSESSMENT/INTERVENTIONS  Patient trach capped. All supplies visible at bedside. No respiratory concerns at this time.      Last VS   Temp: 98.8 °F (37.1 °C) ( 1108)  Pulse: 94 ( 1108)  Resp: 19 ( 110)  BP: 119/76 ( 1108)  SpO2: 95 % ( 110)      Extra trachs at bedside: 4 & 6 cuffed  Level of Consciousness: Level of Consciousness (AVPU): alert  Respiratory Effort: Respiratory Effort: Unlabored Expansion/Accessory Muscle Usage: Expansion/Accessory Muscles/Retractions: no use of accessory muscles, expansion symmetric  All Lung Field Breath Sounds: All Lung Fields Breath Sounds: Anterior:, Posterior:, Lateral:, clear  JOSE Breath Sounds: diminished  LLL Breath Sounds: diminished  RUL Breath Sounds: diminished  RML Breath Sounds: diminished  RLL Breath Sounds: diminished  O2 Device/Concentration: room air  Surgical airway: Yes, Type: Shiley Size: 6, uncuffed  Ambu at bedside:       Active Orders   Respiratory Care    Pulse Oximetry Continuous     Frequency: Continuous     Number of Occurrences: Until Specified    RESPIRATORY COMMUNICATION     " Frequency: Continuous     Number of Occurrences: Until Specified     Order Comments: Begin trach capping trial. Do not suction via tracheostomy tube because we are assessing the pt's ability to clear secretions on her own. Place a  on trach & leave in place as long as patient is tolerating it. If the patient develops shortness of breath or respiratory distress or oxygen desaturation, remove the  & apply oxygen via trach collar. This will terminate the capping trial, & it is then okay to administer tracheal suctioning. Please make a note in the chart if capping trial is terminated.      Routine tracheostomy care     Frequency: BID     Number of Occurrences: Until Specified       RECOMMENDATIONS    We recommend: RRT Recs: Continue POC per primary team.      FOLLOW-UP    Please call back the Rapid Response RT, Nayeli Tiwari, BOOGIE at x [phone] for any questions or concerns.

## 2024-04-30 NOTE — SUBJECTIVE & OBJECTIVE
Interval History: NAEON. Successfully decannulated today. Going for IR venogram with possible tunneled cath exchange      Objective:     Vital Signs (Most Recent):  Temp: 98.8 °F (37.1 °C) (04/30/24 1108)  Pulse: 94 (04/30/24 1108)  Resp: 19 (04/30/24 1108)  BP: 119/76 (04/30/24 1108)  SpO2: 95 % (04/30/24 1108) Vital Signs (24h Range):  Temp:  [98.3 °F (36.8 °C)-99.9 °F (37.7 °C)] 98.8 °F (37.1 °C)  Pulse:  [] 94  Resp:  [16-20] 19  SpO2:  [95 %-100 %] 95 %  BP: (111-124)/(74-79) 119/76     Weight: 122.5 kg (270 lb 1 oz)  Body mass index is 42.3 kg/m².  No intake or output data in the 24 hours ending 04/30/24 1459      Physical Exam  Vitals and nursing note reviewed.   HENT:      Head: Normocephalic and atraumatic.   Neck:      Comments: Trach removed  Cardiovascular:      Rate and Rhythm: Normal rate and regular rhythm.      Heart sounds: Normal heart sounds.   Pulmonary:      Effort: Pulmonary effort is normal. No respiratory distress.      Breath sounds: Normal breath sounds.   Abdominal:      General: Abdomen is flat. There is no distension.      Palpations: Abdomen is soft.      Tenderness: There is no abdominal tenderness.      Comments: PEG tube   Musculoskeletal:      Right lower leg: No edema.      Left lower leg: No edema.      Comments: Moves UEs spontaneously   Skin:     General: Skin is warm and dry.      Findings: Wound present.   Neurological:      General: No focal deficit present.      Mental Status: She is alert.      Comments: Nonverbal, not following commands             Significant Labs: All pertinent labs within the past 24 hours have been reviewed.    Significant Imaging: I have reviewed all pertinent imaging results/findings within the past 24 hours.

## 2024-04-30 NOTE — PT/OT/SLP PROGRESS
Physical Therapy Treatment    Patient Name:  Sarah Saravia   MRN:  [id number]    Recommendations:     Discharge Recommendations: Moderate Intensity Therapy  Discharge Equipment Recommendations: to be determined by next level of care  Barriers to discharge:  Increased skilled assistance required    Assessment:     Sarah Saravia is a 53 y.o. female admitted with a medical diagnosis of Acute cystitis with hematuria.  She presents with the following impairments/functional limitations: weakness, impaired endurance, impaired cardiopulmonary response to activity, impaired functional mobility, impaired balance, impaired cognition, decreased lower extremity function, decreased upper extremity function, decreased safety awareness, pain, impaired coordination. Patient initially requiring increased encouragement to participate in PT session, however increased motivation demonstrated as session progressed. Furthermore, patient demonstrated significantly improved engagement & command following this date. Although patient continuing to require significant assistance of 2 persons for bed mobility, she demonstrated great progress in her EOB sitting tolerance, evidenced by increased time; decreased physical assistance; and improved HR response. On few occasions, SpO2<88% (irregular wave form) however improvements noted with rest & cuing for breathing technique. Patient continues to demonstrate the need for moderate intensity therapy on a daily basis exhibited by decreased independence with self-care and functional mobility      Rehab Prognosis: Good; patient would benefit from acute skilled PT services to address these deficits and reach maximum level of function.    Recent Surgery: * No surgery found *      Plan:     During this hospitalization, patient to be seen 3 x/week to address the identified rehab impairments via therapeutic activities, therapeutic exercises, neuromuscular re-education and progress toward the following  "goals:    Plan of Care Expires:  05/22/24    Subjective     Chief Complaint: Pt demonstrating some pain behaviors with Supine<>Sit  Patient/Family Comments/goals: None communicated  Pain/Comfort:  Pain Rating 1: No pain behaviors observed at rest   Pain Rating Post-Intervention 1: Pain behaviors demonstrated with Supine<>Sit. Pt did not indicate where she was having pain      Objective:     Communicated with RN prior to session.  Patient found HOB elevated with PEG Tube, pressure relief boots, Tracheostomy, telemetry, pulse ox (continuous) (Pressure relief mattress) upon PT entry to room. Rehab Tech Reana assisting throughout session    General Precautions: Standard, aphasia, aspiration, fall, NPO, contact, diabetic   Orthopedic Precautions:N/A   Braces: N/A   Body mass index is 42.3 kg/m².  Oxygen Device: Room Air  Vitals: /81 (BP Location: Right arm)   Pulse 94   Temp 98.8 °F (37.1 °C) (Axillary)   Resp 12   Ht 5' 7" (1.702 m)   Wt 122.5 kg (270 lb 1 oz)   SpO2 99%   BMI 42.30 kg/m²      Functional Mobility:  Bed Mobility: Cuing required for breakdown of complex motor sequence into single steps     Rolling Right: x1, TotalAx2 with HOB Elevated  EOB Scooting:   Anterior: TotalAx2  Boosting: TotalAx2 with HOB Flat  Supine>Sit: x1, TotalAx2 with HOB Elevated  Sit>Supine: x1, TotalAx2 with HOB Flat    Balance:   Static Sitting:  Predominantly Mod-CGA, however ranging from TotalA-SBA  Dynamic Sitting:  TotalA-ModA  Total Time Sitting: ~13 mins EOB    AM-PAC 6 CLICK MOBILITY  Turning over in bed (including adjusting bedclothes, sheets and blankets)?: 1  Sitting down on and standing up from a chair with arms (e.g., wheelchair, bedside commode, etc.): 1  Moving from lying on back to sitting on the side of the bed?: 1  Moving to and from a bed to a chair (including a wheelchair)?: 1  Need to walk in hospital room?: 1  Climbing 3-5 steps with a railing?: 1  Basic Mobility Total Score: 6     Treatment & " Education:  Patient participated in the following seated EOB activities:  Anterior reaching: x2 L/R unilaterally. Mod facilitation for initiation of UE movement  Lateral WS:   Outside SAÚL<>Midline: x2 L/R, MaxA-ModA. Pt requiring assistance L>R.  Protective Response: x3 L/R. Each rep beginning w/ pt placing B hands in rehab tech's hands.  LAQ: x5 L, x4 R. No palpable contraction noted.    Increased time spent with patient to consistently monitor VS response & recovery + demonstrated s/s.    Patient Education Provided on:  The role of physical therapy and how the patient can benefit from skilled services  The negative effects of prolonged bed rest/sedentary behavior, along with the importance of OOB activity & patient participation with PT  The importance of contacting RN, via call light, for mobility throughout the day  Pt white board updated with current therapists name and level of mobility assistance needed.     Patient left HOB elevated with all lines intact, call button in reach, RN notified, and IR RNs present..    GOALS:   Multidisciplinary Problems       Physical Therapy Goals          Problem: Physical Therapy    Goal Priority Disciplines Outcome Goal Variances Interventions   Physical Therapy Goal     PT, PT/OT Progressing     Description: Goals to be met by: 24     Patient will increase functional independence with mobility by performin. Supine to sit with Maximum Assistance  2. Sit to supine with Maximum Assistance  3. Rolling to Left and Right with Moderate Assistance.  4. Sitting at edge of bed 5 minutes with Moderate Assistance- MET , monitor consistency  5. Lower extremity exercise program x20 reps per handout, with assistance as needed                         Time Tracking:     PT Received On: 24  PT Start Time: 1350     PT Stop Time: 1418  PT Total Time (min): 28 min     Billable Minutes: Therapeutic Activity 14 and Neuromuscular Re-education 14    Treatment Type:  Treatment  PT/PTA: PT     Number of PTA visits since last PT visit: 0     04/30/2024

## 2024-04-30 NOTE — NURSING
Nurses Note -- 4 Eyes      4/30/2024   6:39 PM      Skin assessed during: Daily Assessment      [] No Altered Skin Integrity Present    []Prevention Measures Documented      [x] Yes- Altered Skin Integrity Present or Discovered   [x] LDA Added if Not in Epic (Describe Wound)   [] New Altered Skin Integrity was Present on Admit and Documented in LDA   [] Wound Image Taken    Wound Care Consulted? Yes    Attending Nurse:  Jazmín Eastman RN/Staff Member:  GONZALEZ Mathis

## 2024-04-30 NOTE — PROCEDURES
VIR procedure note      Pre Op Diagnosis: ESRD  Post Op Diagnosis: Same    Procedure: Tunneled HD cath exchange + Venogram    Procedure performed by:  Abdiaziz Santillan MD / YOANDY Thomas MD    Written Informed Consent Obtained: Yes  Specimen Removed: No  Estimated Blood Loss: Minimal    Findings:     Successful exchange of dual lumen 14.5 Fr tunneled HD catheter with the tip in the right atrium.    Venogram showed no firbin sheath    Patient tolerated procedure well.     Recommendations:    Catheter can be used immediately.    Abdiaziz Santillan MD  VIR Fellow

## 2024-04-30 NOTE — PLAN OF CARE
Pt arrived to unit in stretcher, connected to monitor, safety/comfort measure implemented, pt is not understanding to keep still

## 2024-04-30 NOTE — PROGRESS NOTES
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 History was given from patient's daughter. She was undergoing dialysis today and noticed she was lethargic, less alert than usual self. Pt completed dialysis and still not acting herself. EMS was called, fever of a 100.  On chart review, she did have an episode of large volume emesis around 1700.  Per EMS, she had a slightly elevated temp 100.0°F, glucose 300s.     In the ED: UA 2+ leuks, >100 WBC, many bacteria, WBC 17, CT abd/pelvis concerning for cystitis. Given vanc/zosyn    Overview/Hospital Course:  Patient was admitted to Hospital Medicine service for medical management and evaluation of urosepsis. Patient was continued on vanc/zosyn. Vascular Neurology consulted concerning mental status change with the recommendation to initiate ASA 81 QD and to obtain an MRI to r/o new stroke. Imaging pending. Nephrology was consulted for regularly scheduled dialysis. Afternoon of 4/12, patient was unable to tolerate filtration of volume during dialsis 2/2 hypotension. Patient received 500cc bolus and was transferred back to floor after finishing the session without volume removed. Will monitor for signs of volume overload. Tailoring insulin regimen while uptitrating tube feeds to goal rate. Staph Epi in all 4 bottles; ID with recommendation to continue Vanc & de-escalate meropenem to ertapenem on 04/15 through 4/16. Patient completed IV course of UTI coverage. Patient tolerated volume removal well in dialysis  throughout the rest of her hospital stay. Pending discharge to LTACH pending bed availability. Patient without BM with one episode of vomiting overnight 4/17. KUB with bowel gas and low concern for obstruction with no transition point noted. Escalating bowel regimen. Pending placement as of 4/22. Pulm consult placed to evaluate for possible trach downsize & eventual decannulation to assist placement if safe. Trach capping trial per pulm for 48h and will assess for decannulation on Monday. DVT studies negative. Pulm successfully decannulated pt 4/30, IR taking patient for venogram and possible tunneled cath exchange d/t repeated clotting not controlled with cath-hedy. Dispo planning with CM ongoing now that she does not have a trach.    Interval History: NAEON. Successfully decannulated today. Going for IR venogram with possible tunneled cath exchange      Objective:     Vital Signs (Most Recent):  Temp: 98.8 °F (37.1 °C) (04/30/24 1108)  Pulse: 94 (04/30/24 1108)  Resp: 19 (04/30/24 1108)  BP: 119/76 (04/30/24 1108)  SpO2: 95 % (04/30/24 1108) Vital Signs (24h Range):  Temp:  [98.3 °F (36.8 °C)-99.9 °F (37.7 °C)] 98.8 °F (37.1 °C)  Pulse:  [] 94  Resp:  [16-20] 19  SpO2:  [95 %-100 %] 95 %  BP: (111-124)/(74-79) 119/76     Weight: 122.5 kg (270 lb 1 oz)  Body mass index is 42.3 kg/m².  No intake or output data in the 24 hours ending 04/30/24 1459      Physical Exam  Vitals and nursing note reviewed.   HENT:      Head: Normocephalic and atraumatic.   Neck:      Comments: Trach removed  Cardiovascular:      Rate and Rhythm: Normal rate and regular rhythm.      Heart sounds: Normal heart sounds.   Pulmonary:      Effort: Pulmonary effort is normal. No respiratory distress.      Breath sounds: Normal breath sounds.   Abdominal:      General: Abdomen is flat. There is no distension.      Palpations: Abdomen is soft.      Tenderness: There is no abdominal tenderness.      Comments: PEG tube   Musculoskeletal:       Right lower leg: No edema.      Left lower leg: No edema.      Comments: Moves UEs spontaneously   Skin:     General: Skin is warm and dry.      Findings: Wound present.   Neurological:      General: No focal deficit present.      Mental Status: She is alert.      Comments: Nonverbal, not following commands             Significant Labs: All pertinent labs within the past 24 hours have been reviewed.    Significant Imaging: I have reviewed all pertinent imaging results/findings within the past 24 hours.    Assessment/Plan:      * Acute cystitis with hematuria  Pt presenting with AMS and worsening lethargy. Pt is non-verbal at baseline, does not follow commands, but was less responsive than normal. Pt found to have a fever. Urinary source suspected based off of urine and CT abd/pelvis. Pt has many prior resistant bacterial infections including urinary sources. Has prior with sensitivity to zosyn and has also improved off ED dose of vanc/zosyn. Lactic elevated a 3.8->3.49 prior to completion of fluid bolus 500ml.    Patient with new fever and tachycardia on 4/25. Low threshold to initiate additional infectious workup and repeat UA.     Plan  S/p course of vanc/ertapenem per ID. Course completed 4/16.  Daily cbc  Contact precautions    Complication of vascular dialysis catheter  Cath intermittently clogging, not resolving with cath-hedy, going for IR venogram 4/30 with possible exchange      Constipation  RESOLVED with Bowel regimen  Patient without BM x5d. One episode of vomitus night of 4/17. Some concern for bowel obstruction. Tolerating continuous tube feeds at goal rate with 0cc on residuals. Physical exam with bowel sounds, soft nontender abdomen. KUB without concern for obstruction with bowel gas, lack of transition point.    Patient now with regular bowel movements on bowel regimen.     Plan  - Miralax BID, Senna BID  - One time mag citrate per PEG ordered  - compazine PRN    Moderate malnutrition  Nutrition  consulted. Most recent weight and BMI monitored-     Measurements:  Wt Readings from Last 1 Encounters:   04/25/24 122.5 kg (270 lb 1 oz)   Body mass index is 42.3 kg/m².    Patient has been screened and assessed by RD.    Malnutrition Type:  Context: acute illness or injury  Level: moderate    Malnutrition Characteristic Summary:  Weight Loss (Malnutrition): 10% in 6 months  Subcutaneous Fat (Malnutrition): mild depletion  Muscle Mass (Malnutrition): mild depletion  Fluid Accumulation (Malnutrition): mild    Interventions/Recommendations (treatment strategy):  1.      Prolonged QT interval  Qtc 526, limit Qtc prolonging drugs as able      Spastic hemiplegia of right dominant side as late effect of cerebrovascular disease   This patient has Chronic right hemiplegia due to stroke. Physical therapy services has not been scheduled. Continue all standard measures for pressure injury prevention and consult wound care for any wounds (chronic or acute).    ESRD (end stage renal disease) on dialysis  Creatine stable for now. BMP reviewed- noted Estimated Creatinine Clearance: 12.4 mL/min (A) (based on SCr of 7.1 mg/dL (H)). according to latest data. Based on current GFR, CKD stage is end stage.  Monitor UOP and serial BMP and adjust therapy as needed. Renally dose meds. Avoid nephrotoxic medications and procedures.    -- HD MWF  -- nephro following  -- sevelamer TID    Status post tracheostomy  Resolved, decannulated 4/30    Tracheostomy created in February following CVA. Pulmonology consulted to assess for possibility of capping trial/decannulation, appreciate their assistance. Capping trial began today, pulm will reassess for decannulation 4/29. If decannulation is possible and safe would greatly expand placement options.      Acute encephalopathy  Pt is non-verbal and does not follow commands at baseline. Vascular Neurology consulted given acute change from baseline. MRI with concern for evolution of prior strokes with  noted differential of T2 hyperintensities including vasculitis vs demyelination. Discussed with Vascular Neurology; low concern for ongoing vasculitis/demyelination, likely represents typical evolution of prior infarcts.    Patient at baseline as of 4/22.     Plan  - STAT head imaging for concern of new change in mental status/focal deficit    Type 2 diabetes mellitus with hyperglycemia, with long-term current use of insulin  Patient's FSGs are controlled on current medication regimen.  Last A1c reviewed-   Lab Results   Component Value Date    HGBA1C 10.4 (H) 01/30/2024     Most recent fingerstick glucose reviewed-   Recent Labs   Lab 04/29/24 2037 04/30/24  0002 04/30/24  0542 04/30/24  1206   POCTGLUCOSE 139* 127* 121* 152*       Current correctional scale  Medium  Maintain anti-hyperglycemic dose as follows-   Antihyperglycemics (From admission, onward)      Start     Stop Route Frequency Ordered    04/17/24 1200  insulin aspart U-100 pen 4 Units         -- SubQ Every 6 hours 04/17/24 0938    04/17/24 0944  insulin aspart U-100 pen 0-10 Units         -- SubQ Every 6 hours PRN 04/17/24 0938    04/13/24 2100  insulin detemir U-100 (Levemir) pen 27 Units         -- SubQ 2 times daily 04/13/24 0931          Hold Oral hypoglycemics while patient is in the hospital.  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 Pt underwent extensive course, currently still healing from this, but no longer has wound vac. Pt's wound currently covered with bandage. Picture in media tabs    Plan  Wound care        VTE Risk Mitigation (From admission, onward)           Ordered     heparin (porcine) injection  3,000 Units  As needed (PRN)         04/17/24 0825     heparin (porcine) injection 1,000 Units  As needed (PRN)         04/12/24 0951     heparin (porcine) injection 7,500 Units  Every 8 hours         04/11/24 0252                    Discharge Planning   ZEKE: 5/2/2024     Code Status: Full Code   Is the patient medically ready for discharge?: No    Reason for patient still in hospital (select all that apply): Patient trending condition  Discharge Plan A: Skilled Nursing Facility                  Osito Dos Santos MD  Department of Hospital Medicine   [facility] - Telemetry Stepdown

## 2024-04-30 NOTE — PROCEDURES
RAPID RESPONSE VASCULAR ACCESS NOTE       Single lumen 18G, 10CM midline placed in the left cephalic vein. Needle advanced into the vessel under real time ultrasound guidance.    Max dwell date: 5/29/2024   Lot number: [id number]

## 2024-04-30 NOTE — PT/OT/SLP PROGRESS
Speech Language Pathology      Sarah NADER Saravia  MRN: [id number]    Patient not seen today secondary to npo for possible procedure. Will follow-up tomorrow.

## 2024-04-30 NOTE — PLAN OF CARE
Problem: Infection  Goal: Absence of Infection Signs and Symptoms  Outcome: Progressing     Problem: Skin Injury Risk Increased  Goal: Skin Health and Integrity  Outcome: Progressing     Problem: Adult Inpatient Plan of Care  Goal: Plan of Care Review  Outcome: Progressing  Goal: Patient-Specific Goal (Individualized)  Outcome: Progressing  Goal: Absence of Hospital-Acquired Illness or Injury  Outcome: Progressing  Goal: Optimal Comfort and Wellbeing  Outcome: Progressing  Goal: Readiness for Transition of Care  Outcome: Progressing     Problem: Bariatric Environmental Safety  Goal: Safety Maintained with Care  Outcome: Progressing

## 2024-04-30 NOTE — PLAN OF CARE
Pt tolerated hemodialysis cath exchanged well, dressing c/d/I, called report to floor nurse, transporting pt back to room

## 2024-04-30 NOTE — ASSESSMENT & PLAN NOTE
Patient's FSGs are controlled on current medication regimen.  Last A1c reviewed-   Lab Results   Component Value Date    HGBA1C 10.4 (H) 01/30/2024     Most recent fingerstick glucose reviewed-   Recent Labs   Lab 04/29/24 2037 04/30/24  0002 04/30/24  0542 04/30/24  1206   POCTGLUCOSE 139* 127* 121* 152*       Current correctional scale  Medium  Maintain anti-hyperglycemic dose as follows-   Antihyperglycemics (From admission, onward)    Start     Stop Route Frequency Ordered    04/17/24 1200  insulin aspart U-100 pen 4 Units         -- SubQ Every 6 hours 04/17/24 0938    04/17/24 0944  insulin aspart U-100 pen 0-10 Units         -- SubQ Every 6 hours PRN 04/17/24 0938    04/13/24 2100  insulin detemir U-100 (Levemir) pen 27 Units         -- SubQ 2 times daily 04/13/24 0931        Hold Oral hypoglycemics while patient is in the hospital.  Aspart 3u q4h  Levemir 27u BID  Anticipate discharge with above regimen given well controlled blood glucose while at goal TF rate  MDSSI  POCT glucose q4h, please give remaining sliding scale requirement if above the scheduled 3u  Ordered current regimen with elevation in glucose

## 2024-04-30 NOTE — ASSESSMENT & PLAN NOTE
Resolved, decannulated 4/30    Tracheostomy created in February following CVA. Pulmonology consulted to assess for possibility of capping trial/decannulation, appreciate their assistance. Capping trial began today, pulm will reassess for decannulation 4/29. If decannulation is possible and safe would greatly expand placement options.

## 2024-04-30 NOTE — PLAN OF CARE
[facility] - Tele[facility] Stepdown  Discharge Reassessment    Primary Care Provider: No, Primary Doctor    Expected Discharge Date: 5/2/2024    Reassessment (most recent)       Discharge Reassessment - 04/30/24 1553          Discharge Reassessment    Assessment Type Discharge Planning Reassessment     Did the patient's condition or plan change since previous assessment? No     Discharge Plan discussed with: Adult children     Communicated ZEKE with patient/caregiver Yes     Discharge Plan A Skilled Nursing Facility     Discharge Plan B Home Health     DME Needed Upon Discharge  other (see comments)   TBD    Transition of Care Barriers Other (see comments)   Trach    Why the patient remains in the hospital Placement issues        Post-Acute Status    Post-Acute Authorization Placement     Post-Acute Placement Status Pending medical clearance/testing                   Sara Loredo RN  Ext [phone]

## 2024-04-30 NOTE — PROGRESS NOTES
[facility] - Tele[facility]down  Pulmonology  Progress Note    Patient Name: Sarah Saravia  MRN: [id number]  Admission Date: 4/10/2024  Hospital Length of Stay: 19 days  Code Status: Full Code  Attending Provider: Soco Erickson MD  Primary Care Provider: Deanna, Primary Doctor   Principal Problem: Acute cystitis with hematuria    Subjective:     Interval History: No changes overnight      Objective:     Vital Signs (Most Recent):  Temp: 98.8 °F (37.1 °C) (04/30/24 1108)  Pulse: 94 (04/30/24 1108)  Resp: 19 (04/30/24 1108)  BP: 119/76 (04/30/24 1108)  SpO2: 95 % (04/30/24 1108) Vital Signs (24h Range):  Temp:  [98.3 °F (36.8 °C)-99.9 °F (37.7 °C)] 98.8 °F (37.1 °C)  Pulse:  [] 94  Resp:  [16-20] 19  SpO2:  [95 %-100 %] 95 %  BP: (111-131)/(74-79) 119/76     Weight: 122.5 kg (270 lb 1 oz)  Body mass index is 42.3 kg/m².      Intake/Output Summary (Last 24 hours) at 4/30/2024 1254  Last data filed at 4/29/2024 1330  Gross per 24 hour   Intake 600 ml   Output 1950 ml   Net -1350 ml        Physical Exam  Vitals and nursing note reviewed.   HENT:      Head: Normocephalic and atraumatic.   Neck:      Comments: Trach removed  Cardiovascular:      Rate and Rhythm: Normal rate and regular rhythm.      Heart sounds: Normal heart sounds.   Pulmonary:      Effort: Pulmonary effort is normal. No respiratory distress.      Breath sounds: Normal breath sounds.   Abdominal:      General: Abdomen is flat. There is no distension.      Palpations: Abdomen is soft.      Tenderness: There is no abdominal tenderness.      Comments: PEG tube   Musculoskeletal:      Right lower leg: No edema.      Left lower leg: No edema.      Comments: Moves UEs spontaneously   Skin:     General: Skin is warm and dry.      Findings: Wound present.   Neurological:      General: No focal deficit present.      Mental Status: She is alert.      Comments: Nonverbal, not following commands           Review of Systems    Vents:  Oxygen Concentration (%): 21  (04/26/24 0559)    Lines/Drains/Airways       Central Venous Catheter Line  Duration                  Hemodialysis Catheter 02/29/24 1528 right internal jugular 60 days              Drain  Duration                  Gastrostomy/Enterostomy 02/22/24 1200 LUQ 67 days              Airway  Duration             Adult Surgical Airway 03/13/24 1015 Shiley Uncuffed 6.0/ 75mm 48 days              Peripheral Intravenous Line  Duration                  Peripheral IV - Single Lumen 04/28/24 0633 20 G Left Antecubital 2 days                    Significant Labs:    CBC/Anemia Profile:  Recent Labs   Lab 04/28/24  1343 04/30/24  0611   WBC 11.27 7.83   HGB 9.8* 10.0*   HCT 31.0* 33.2*    284   MCV 86 90   RDW 14.8* 14.9*        Chemistries:  Recent Labs   Lab 04/29/24  0445   *   K 4.1   CL 91*   CO2 20*   BUN 75*   CREATININE 7.1*   CALCIUM 10.6*   ALBUMIN 3.2*   MG 2.3   PHOS 6.1*       All pertinent labs within the past 24 hours have been reviewed.    Significant Imaging:  I have reviewed all pertinent imaging results/findings within the past 24 hours.  Assessment/Plan:     ENT  Status post tracheostomy  Tracheostomy removed without difficulty.  - cover with gauze dressing and tape  - change daily  - no further recommendations      Pulmonary team to sign off       Justin Ellerman, MD  Pulmonology  [facility] - Tele[facility] Stepdown

## 2024-04-30 NOTE — ASSESSMENT & PLAN NOTE
Cath intermittently clogging, not resolving with cath-hedy, going for IR venogram 4/30 with possible exchange

## 2024-05-01 LAB
ALBUMIN SERPL BCP-MCNC: 3.1 G/DL (ref 3.5–5.2)
ANION GAP SERPL CALC-SCNC: 13 MMOL/L (ref 8–16)
BUN SERPL-MCNC: 56 MG/DL (ref 6–20)
CALCIUM SERPL-MCNC: 10.2 MG/DL (ref 8.7–10.5)
CHLORIDE SERPL-SCNC: 95 MMOL/L (ref 95–110)
CO2 SERPL-SCNC: 25 MMOL/L (ref 23–29)
CREAT SERPL-MCNC: 5.9 MG/DL (ref 0.5–1.4)
EST. GFR  (NO RACE VARIABLE): 8 ML/MIN/1.73 M^2
GLUCOSE SERPL-MCNC: 126 MG/DL (ref 70–110)
MAGNESIUM SERPL-MCNC: 2.3 MG/DL (ref 1.6–2.6)
PHOSPHATE SERPL-MCNC: 5.4 MG/DL (ref 2.7–4.5)
POCT GLUCOSE: 119 MG/DL (ref 70–110)
POCT GLUCOSE: 145 MG/DL (ref 70–110)
POCT GLUCOSE: 150 MG/DL (ref 70–110)
POCT GLUCOSE: 170 MG/DL (ref 70–110)
POTASSIUM SERPL-SCNC: 3.8 MMOL/L (ref 3.5–5.1)
SODIUM SERPL-SCNC: 133 MMOL/L (ref 136–145)

## 2024-05-01 PROCEDURE — 36415 COLL VENOUS BLD VENIPUNCTURE: CPT

## 2024-05-01 PROCEDURE — 97535 SELF CARE MNGMENT TRAINING: CPT

## 2024-05-01 PROCEDURE — 25000003 PHARM REV CODE 250

## 2024-05-01 PROCEDURE — 20600001 HC STEP DOWN PRIVATE ROOM

## 2024-05-01 PROCEDURE — 63600175 PHARM REV CODE 636 W HCPCS: Performed by: NURSE PRACTITIONER

## 2024-05-01 PROCEDURE — 80100014 HC HEMODIALYSIS 1:1

## 2024-05-01 PROCEDURE — 25000003 PHARM REV CODE 250: Performed by: STUDENT IN AN ORGANIZED HEALTH CARE EDUCATION/TRAINING PROGRAM

## 2024-05-01 PROCEDURE — 27000207 HC ISOLATION

## 2024-05-01 PROCEDURE — 83735 ASSAY OF MAGNESIUM: CPT

## 2024-05-01 PROCEDURE — 80069 RENAL FUNCTION PANEL: CPT

## 2024-05-01 PROCEDURE — 25000242 PHARM REV CODE 250 ALT 637 W/ HCPCS

## 2024-05-01 PROCEDURE — 92610 EVALUATE SWALLOWING FUNCTION: CPT

## 2024-05-01 PROCEDURE — 25000003 PHARM REV CODE 250: Performed by: INTERNAL MEDICINE

## 2024-05-01 PROCEDURE — 25000003 PHARM REV CODE 250: Performed by: NURSE PRACTITIONER

## 2024-05-01 PROCEDURE — 63600175 PHARM REV CODE 636 W HCPCS

## 2024-05-01 PROCEDURE — C1751 CATH, INF, PER/CENT/MIDLINE: HCPCS

## 2024-05-01 RX ORDER — SODIUM CHLORIDE 9 MG/ML
INJECTION, SOLUTION INTRAVENOUS ONCE
Status: COMPLETED | OUTPATIENT
Start: 2024-05-01 | End: 2024-05-01

## 2024-05-01 RX ADMIN — PANTOPRAZOLE SODIUM 40 MG: 40 GRANULE, DELAYED RELEASE ORAL at 02:05

## 2024-05-01 RX ADMIN — INSULIN DETEMIR 27 UNITS: 100 INJECTION, SOLUTION SUBCUTANEOUS at 09:05

## 2024-05-01 RX ADMIN — ZINC SULFATE 220 MG (50 MG) CAPSULE 220 MG: CAPSULE at 02:05

## 2024-05-01 RX ADMIN — POLYETHYLENE GLYCOL 3350 17 G: 17 POWDER, FOR SOLUTION ORAL at 09:05

## 2024-05-01 RX ADMIN — HEPARIN SODIUM 7500 UNITS: 5000 INJECTION INTRAVENOUS; SUBCUTANEOUS at 03:05

## 2024-05-01 RX ADMIN — SEVELAMER CARBONATE 0.8 G: 2400 POWDER, FOR SUSPENSION ORAL at 02:05

## 2024-05-01 RX ADMIN — INSULIN ASPART 4 UNITS: 100 INJECTION, SOLUTION INTRAVENOUS; SUBCUTANEOUS at 12:05

## 2024-05-01 RX ADMIN — HEPARIN SODIUM 7500 UNITS: 5000 INJECTION INTRAVENOUS; SUBCUTANEOUS at 09:05

## 2024-05-01 RX ADMIN — ERYTHROPOIETIN 6100 UNITS: 10000 INJECTION, SOLUTION INTRAVENOUS; SUBCUTANEOUS at 11:05

## 2024-05-01 RX ADMIN — INSULIN ASPART 4 UNITS: 100 INJECTION, SOLUTION INTRAVENOUS; SUBCUTANEOUS at 06:05

## 2024-05-01 RX ADMIN — METOPROLOL TARTRATE 25 MG: 25 TABLET, FILM COATED ORAL at 02:05

## 2024-05-01 RX ADMIN — POLYETHYLENE GLYCOL 3350 17 G: 17 POWDER, FOR SOLUTION ORAL at 02:05

## 2024-05-01 RX ADMIN — ASPIRIN 81 MG CHEWABLE TABLET 81 MG: 81 TABLET CHEWABLE at 02:05

## 2024-05-01 RX ADMIN — DOCUSATE SODIUM AND SENNOSIDES 1 TABLET: 8.6; 5 TABLET, FILM COATED ORAL at 02:05

## 2024-05-01 RX ADMIN — DOCUSATE SODIUM AND SENNOSIDES 1 TABLET: 8.6; 5 TABLET, FILM COATED ORAL at 09:05

## 2024-05-01 RX ADMIN — SODIUM CHLORIDE 100 ML: 9 INJECTION, SOLUTION INTRAVENOUS at 11:05

## 2024-05-01 RX ADMIN — INSULIN ASPART 2 UNITS: 100 INJECTION, SOLUTION INTRAVENOUS; SUBCUTANEOUS at 06:05

## 2024-05-01 RX ADMIN — Medication 500 MG: at 02:05

## 2024-05-01 RX ADMIN — INSULIN DETEMIR 27 UNITS: 100 INJECTION, SOLUTION SUBCUTANEOUS at 02:05

## 2024-05-01 RX ADMIN — SEVELAMER CARBONATE 0.8 G: 2400 POWDER, FOR SUSPENSION ORAL at 09:05

## 2024-05-01 RX ADMIN — HEPARIN SODIUM 7500 UNITS: 5000 INJECTION INTRAVENOUS; SUBCUTANEOUS at 06:05

## 2024-05-01 RX ADMIN — ATORVASTATIN CALCIUM 40 MG: 40 TABLET, FILM COATED ORAL at 02:05

## 2024-05-01 RX ADMIN — PSYLLIUM HUSK 1 PACKET: 3.4 POWDER ORAL at 02:05

## 2024-05-01 RX ADMIN — METOPROLOL TARTRATE 25 MG: 25 TABLET, FILM COATED ORAL at 09:05

## 2024-05-01 RX ADMIN — Medication 500 MG: at 09:05

## 2024-05-01 NOTE — PROGRESS NOTES
[facility] - Tele[facility] Stepdown  Adult Nutrition  Progress Note    SUMMARY       Recommendations    As tolerated, increase TF rate (of Novasource) to 40 mL/hr = 1920 kcals, 87 g of protein, 688 mL fluid.  RD to monitor & follow-up.    Goals: Meet % EEN, EPN by RD f/u date  Nutrition Goal Status: progressing towards goal  Communication of RD Recs: reviewed with RN    Assessment and Plan    Moderate malnutrition    Malnutrition Type:  Context: acute illness or injury  Level: moderate    Related to (etiology):   Inability to consume sufficient energy     Signs and Symptoms (as evidenced by):   Weight loss, NFPE, Edema    Malnutrition Characteristic Summary:  Weight Loss (Malnutrition): 10% in 6 months  Subcutaneous Fat (Malnutrition): mild depletion  Muscle Mass (Malnutrition): mild depletion  Fluid Accumulation (Malnutrition): mild    Interventions/Recommendations (treatment strategy):  Collaboration of nutrition care w/ other providers  EN    Nutrition Diagnosis Status:   New/Continues     Malnutrition Assessment    Malnutrition Context: acute illness or injury  Malnutrition Level: moderate    Weight Loss (Malnutrition): 10% in 6 months  Subcutaneous Fat (Malnutrition): mild depletion  Muscle Mass (Malnutrition): mild depletion  Fluid Accumulation (Malnutrition): mild     Reason for Assessment    Reason For Assessment: RD follow-up  Diagnosis: other (see comments) (Acute cystitis with hematuria)  Relevant Medical History: CVA, PEG, DM  Interdisciplinary Rounds: did not attend    General Information Comments: [facility] this AM for HD. Remains NPO & TFs being increased back to goal (per RN). Moderate malnutrition diagnosis continues; please see PES statement for details.   Nutrition Discharge Planning: Adequate nutrition    Nutrition/Diet History    Spiritual, Cultural Beliefs, Sabianism Practices, Values that Affect Care: no  Food Allergies: NKFA  Factors Affecting Nutritional Intake: NPO    Anthropometrics    Temp:  "98.1 °F (36.7 °C)  Height Method: Stated  Height: 5' 7" (170.2 cm)  Height (inches): 67 in  Weight Method: Bed Scale  Weight: 122.5 kg (270 lb 1 oz)  Weight (lb): 270.07 lb  Ideal Body Weight (IBW), Female: 135 lb  % Ideal Body Weight, Female (lb): 200.05 %  BMI (Calculated): 42.3  BMI Grade: greater than 40 - morbid obesity  Usual Body Weight (UBW), kg: (!) 136.4 kg  % Usual Body Weight: 90  % Weight Change From Usual Weight: -10.19 %    Lab/Procedures/Meds    Pertinent Labs Reviewed: reviewed  Pertinent Labs Comments: Creat 5.9, GFR 8, P 5.4, A1C 10.4  Pertinent Medications Reviewed: reviewed  Pertinent Medications Comments: -    Estimated/Assessed Needs    Weight Used For Calorie Calculations: 122.5 kg (270 lb 1 oz)    Energy Calorie Requirements (kcal): 2048 kcal/d  Energy Need Method: [place] Jeor (1.1 PAL)    Protein Requirements:  g/d (.8-1 g/kg)  Weight Used For Protein Calculations: 122.5 kg (270 lb 1 oz)    Estimated Fluid Requirement Method: other (see comments) (Per MD)  RDA Method (mL): 2048    CHO Requirement: 256g    Nutrition Prescription Ordered    Current Diet Order: NPO  Current Nutrition Support Formula Ordered: Novasource Renal  Current Nutrition Support Rate Ordered: 20 mL/hr    Evaluation of Received Nutrient/Fluid Intake    Enteral Calories (kcal): 960  Enteral Protein (gm): 44  Enteral (Free Water) Fluid (mL): 344  Free Water Flush Fluid (mL): 1200    % Kcal Needs: 47%  % Protein Needs: 45%    I/O: -2.4L since 4/17    Energy Calories Required: not meeting needs  Protein Required: not meeting needs  Fluid Required: other (see comments) (Per MD)    Comments: LBM: 4/28    Tolerance: tolerating    Nutrition Risk    Level of Risk/Frequency of Follow-up:  (1x/week)     Monitor and Evaluation    Food and Nutrient Intake: enteral nutrition intake  Food and Nutrient Adminstration: enteral and parenteral nutrition administration  Physical Activity and Function: nutrition-related ADLs and " IADLs  Anthropometric Measurements: weight, weight change  Biochemical Data, Medical Tests and Procedures: glucose/endocrine profile, lipid profile, inflammatory profile, gastrointestinal profile  Nutrition-Focused Physical Findings: overall appearance     Nutrition Follow-Up    RD Follow-up?: Yes

## 2024-05-01 NOTE — PLAN OF CARE
Call placed to patient's daughter Aleks ([phone]) to discuss her mother's discharge plan.    Patient/family provided list of facilities in-network with patient's payor plan. Providers that are owned, operated, or affiliated with [facility] are included on the list.     Patient/family instructed to identify preference. She is agreeable to referrals being sent to multiple facilities on the [facility], [place], and [place] areas.    If an additional preferred facility not listed above is identified, additional referral to be sent. If above facilities unable to accept, will send additional referrals to in-network providers.

## 2024-05-01 NOTE — SUBJECTIVE & OBJECTIVE
Interval History: NAEON. S/p decannulation and TDC exchange yesterday.       Objective:     Vital Signs (Most Recent):  Temp: 99.9 °F (37.7 °C) (05/01/24 1557)  Pulse: 101 (05/01/24 1557)  Resp: 20 (05/01/24 1557)  BP: 134/88 (05/01/24 1557)  SpO2: 100 % (05/01/24 1557) Vital Signs (24h Range):  Temp:  [98.1 °F (36.7 °C)-99.9 °F (37.7 °C)] 99.9 °F (37.7 °C)  Pulse:  [] 101  Resp:  [8-20] 20  SpO2:  [96 %-100 %] 100 %  BP: (102-158)/(64-94) 134/88     Weight: 122.5 kg (270 lb 1 oz)  Body mass index is 42.3 kg/m².    Intake/Output Summary (Last 24 hours) at 5/1/2024 1601  Last data filed at 5/1/2024 1230  Gross per 24 hour   Intake 500 ml   Output 2000 ml   Net -1500 ml         Physical Exam  Vitals and nursing note reviewed.   HENT:      Head: Normocephalic and atraumatic.   Neck:      Comments: Trach removed  Cardiovascular:      Rate and Rhythm: Normal rate and regular rhythm.      Heart sounds: Normal heart sounds.   Pulmonary:      Effort: Pulmonary effort is normal. No respiratory distress.      Breath sounds: Normal breath sounds.   Abdominal:      General: Abdomen is flat. There is no distension.      Palpations: Abdomen is soft.      Tenderness: There is no abdominal tenderness.      Comments: PEG tube   Musculoskeletal:      Right lower leg: No edema.      Left lower leg: No edema.      Comments: Moves UEs spontaneously   Skin:     General: Skin is warm and dry.      Findings: Wound present.   Neurological:      General: No focal deficit present.      Mental Status: She is alert.      Comments: Nonverbal, not following commands             Significant Labs: All pertinent labs within the past 24 hours have been reviewed.    Significant Imaging: I have reviewed all pertinent imaging results/findings within the past 24 hours.  Review of Systems

## 2024-05-01 NOTE — PLAN OF CARE
Problem: Infection  Goal: Absence of Infection Signs and Symptoms  Outcome: Progressing     Problem: Skin Injury Risk Increased  Goal: Skin Health and Integrity  Outcome: Progressing     Problem: Adult Inpatient Plan of Care  Goal: Plan of Care Review  Outcome: Progressing  Goal: Patient-Specific Goal (Individualized)  Outcome: Progressing  Goal: Absence of Hospital-Acquired Illness or Injury  Outcome: Progressing  Goal: Optimal Comfort and Wellbeing  Outcome: Progressing  Goal: Readiness for Transition of Care  Outcome: Progressing     Problem: Bariatric Environmental Safety  Goal: Safety Maintained with Care  Outcome: Progressing     Problem: Device-Related Complication Risk (Hemodialysis)  Goal: Safe, Effective Therapy Delivery  Outcome: Progressing     Problem: Hemodynamic Instability (Hemodialysis)  Goal: Effective Tissue Perfusion  Outcome: Progressing

## 2024-05-01 NOTE — ASSESSMENT & PLAN NOTE
Nutrition consulted. Most recent weight and BMI monitored-     Measurements:  Wt Readings from Last 1 Encounters:   05/01/24 122.5 kg (270 lb 1 oz)   Body mass index is 42.3 kg/m².    Patient has been screened and assessed by RD.    Malnutrition Type:  Context: acute illness or injury  Level: moderate    Malnutrition Characteristic Summary:  Weight Loss (Malnutrition): 10% in 6 months  Subcutaneous Fat (Malnutrition): mild depletion  Muscle Mass (Malnutrition): mild depletion  Fluid Accumulation (Malnutrition): mild    Interventions/Recommendations (treatment strategy):  1.    -- TF  -- going for swallow study with SLP to assess possibility of pleasure oral feedings

## 2024-05-01 NOTE — ASSESSMENT & PLAN NOTE
Creatine stable for now. BMP reviewed- noted Estimated Creatinine Clearance: 15 mL/min (A) (based on SCr of 5.9 mg/dL (H)). according to latest data. Based on current GFR, CKD stage is end stage.  Monitor UOP and serial BMP and adjust therapy as needed. Renally dose meds. Avoid nephrotoxic medications and procedures.    -- HD MWF  -- nephro following  -- sevelamer TID

## 2024-05-01 NOTE — ASSESSMENT & PLAN NOTE
Pt presenting with AMS and worsening lethargy. Pt is non-verbal at baseline, does not follow commands, but was less responsive than normal. Pt found to have a fever. Urinary source suspected based off of urine and CT abd/pelvis. Pt has many prior resistant bacterial infections including urinary sources. Has prior with sensitivity to zosyn and has also improved off ED dose of vanc/zosyn. Lactic elevated a 3.8->3.49 prior to completion of fluid bolus 500ml.    Patient with new fever and tachycardia on 4/25. Low threshold to initiate additional infectious workup and repeat UA.     Plan  S/p course of vanc/ertapenem per ID. Course completed 4/16.  Daily cbc  Contact precautions    *on contact precautions indefinitely while patient still makes urine given pt incontinent per infection control

## 2024-05-01 NOTE — PT/OT/SLP PROGRESS
Physical Therapy      Patient Name:  Sarah Saravia   MRN:  [id number]    Patient not seen today secondary to Dialysis. Will follow-up next day.

## 2024-05-01 NOTE — PROGRESS NOTES
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 History was given from patient's daughter. She was undergoing dialysis today and noticed she was lethargic, less alert than usual self. Pt completed dialysis and still not acting herself. EMS was called, fever of a 100.  On chart review, she did have an episode of large volume emesis around 1700.  Per EMS, she had a slightly elevated temp 100.0°F, glucose 300s.     In the ED: UA 2+ leuks, >100 WBC, many bacteria, WBC 17, CT abd/pelvis concerning for cystitis. Given vanc/zosyn    Overview/Hospital Course:  Patient was admitted to Hospital Medicine service for medical management and evaluation of urosepsis. Patient was continued on vanc/zosyn. Vascular Neurology consulted concerning mental status change with the recommendation to initiate ASA 81 QD and to obtain an MRI to r/o new stroke. Imaging pending. Nephrology was consulted for regularly scheduled dialysis. Afternoon of 4/12, patient was unable to tolerate filtration of volume during dialsis 2/2 hypotension. Patient received 500cc bolus and was transferred back to floor after finishing the session without volume removed. Will monitor for signs of volume overload. Tailoring insulin regimen while uptitrating tube feeds to goal rate. Staph Epi in all 4 bottles; ID with recommendation to continue Vanc & de-escalate meropenem to ertapenem on 04/15 through 4/16. Patient completed IV course of UTI coverage. Patient tolerated volume removal well in dialysis  throughout the rest of her hospital stay. Pending discharge to LTACH pending bed availability. Patient without BM with one episode of vomiting overnight 4/17. KUB with bowel gas and low concern for obstruction with no transition point noted. Escalating bowel regimen. Pending placement as of 4/22. Pulm consult placed to evaluate for possible trach downsize & eventual decannulation to assist placement if safe. Trach capping trial per pulm for 48h and will assess for decannulation on Monday. DVT studies negative. Pulm successfully decannulated pt 4/30, IR exchanged TDC. Pending swallow study with SLP and waiting for dispo options.     Interval History: NAEON. S/p decannulation and TDC exchange yesterday.       Objective:     Vital Signs (Most Recent):  Temp: 99.9 °F (37.7 °C) (05/01/24 1557)  Pulse: 101 (05/01/24 1557)  Resp: 20 (05/01/24 1557)  BP: 134/88 (05/01/24 1557)  SpO2: 100 % (05/01/24 1557) Vital Signs (24h Range):  Temp:  [98.1 °F (36.7 °C)-99.9 °F (37.7 °C)] 99.9 °F (37.7 °C)  Pulse:  [] 101  Resp:  [8-20] 20  SpO2:  [96 %-100 %] 100 %  BP: (102-158)/(64-94) 134/88     Weight: 122.5 kg (270 lb 1 oz)  Body mass index is 42.3 kg/m².    Intake/Output Summary (Last 24 hours) at 5/1/2024 1601  Last data filed at 5/1/2024 1230  Gross per 24 hour   Intake 500 ml   Output 2000 ml   Net -1500 ml         Physical Exam  Vitals and nursing note reviewed.   HENT:      Head: Normocephalic and atraumatic.   Neck:      Comments: Trach removed  Cardiovascular:      Rate and Rhythm: Normal rate and regular rhythm.      Heart sounds: Normal heart sounds.   Pulmonary:      Effort: Pulmonary effort is normal. No respiratory distress.      Breath sounds: Normal breath sounds.   Abdominal:      General: Abdomen is flat. There is no distension.      Palpations: Abdomen is soft.      Tenderness: There is no abdominal tenderness.      Comments: PEG tube   Musculoskeletal:      Right lower leg: No edema.      Left lower leg: No  edema.      Comments: Moves UEs spontaneously   Skin:     General: Skin is warm and dry.      Findings: Wound present.   Neurological:      General: No focal deficit present.      Mental Status: She is alert.      Comments: Nonverbal, not following commands             Significant Labs: All pertinent labs within the past 24 hours have been reviewed.    Significant Imaging: I have reviewed all pertinent imaging results/findings within the past 24 hours.  Review of Systems    Assessment/Plan:      * Acute cystitis with hematuria  Pt presenting with AMS and worsening lethargy. Pt is non-verbal at baseline, does not follow commands, but was less responsive than normal. Pt found to have a fever. Urinary source suspected based off of urine and CT abd/pelvis. Pt has many prior resistant bacterial infections including urinary sources. Has prior with sensitivity to zosyn and has also improved off ED dose of vanc/zosyn. Lactic elevated a 3.8->3.49 prior to completion of fluid bolus 500ml.    Patient with new fever and tachycardia on 4/25. Low threshold to initiate additional infectious workup and repeat UA.     Plan  S/p course of vanc/ertapenem per ID. Course completed 4/16.  Daily cbc  Contact precautions    *on contact precautions indefinitely while patient still makes urine given pt incontinent per infection control    Complication of vascular dialysis catheter  Cath intermittently clogging, not resolving with cath-hedy, going for IR venogram 4/30 with possible exchange. S/p exchange with IR on 4/30      Constipation  RESOLVED with Bowel regimen  Patient without BM x5d. One episode of vomitus night of 4/17. Some concern for bowel obstruction. Tolerating continuous tube feeds at goal rate with 0cc on residuals. Physical exam with bowel sounds, soft nontender abdomen. KUB without concern for obstruction with bowel gas, lack of transition point.    Patient now with regular bowel movements on bowel regimen.     Plan  - Miralax  BID, Senna BID  - One time mag citrate per PEG ordered  - compazine PRN    Moderate malnutrition  Nutrition consulted. Most recent weight and BMI monitored-     Measurements:  Wt Readings from Last 1 Encounters:   05/01/24 122.5 kg (270 lb 1 oz)   Body mass index is 42.3 kg/m².    Patient has been screened and assessed by RD.    Malnutrition Type:  Context: acute illness or injury  Level: moderate    Malnutrition Characteristic Summary:  Weight Loss (Malnutrition): 10% in 6 months  Subcutaneous Fat (Malnutrition): mild depletion  Muscle Mass (Malnutrition): mild depletion  Fluid Accumulation (Malnutrition): mild    Interventions/Recommendations (treatment strategy):  1.    -- TF  -- going for swallow study with SLP to assess possibility of pleasure oral feedings    Prolonged QT interval  Qtc 526, limit Qtc prolonging drugs as able      Spastic hemiplegia of right dominant side as late effect of cerebrovascular disease   This patient has Chronic right hemiplegia due to stroke. Physical therapy services has not been scheduled. Continue all standard measures for pressure injury prevention and consult wound care for any wounds (chronic or acute).    ESRD (end stage renal disease) on dialysis  Creatine stable for now. BMP reviewed- noted Estimated Creatinine Clearance: 15 mL/min (A) (based on SCr of 5.9 mg/dL (H)). according to latest data. Based on current GFR, CKD stage is end stage.  Monitor UOP and serial BMP and adjust therapy as needed. Renally dose meds. Avoid nephrotoxic medications and procedures.    -- HD MWF  -- nephro following  -- sevelamer TID    Status post tracheostomy  Resolved, decannulated 4/30    Acute encephalopathy  Pt is non-verbal and does not follow commands at baseline. Vascular Neurology consulted given acute change from baseline. MRI with concern for evolution of prior strokes with noted differential of T2 hyperintensities including vasculitis vs demyelination. Discussed with Vascular  Neurology; low concern for ongoing vasculitis/demyelination, likely represents typical evolution of prior infarcts.    Patient at baseline as of 4/22.     Plan  - STAT head imaging for concern of new change in mental status/focal deficit    Type 2 diabetes mellitus with hyperglycemia, with long-term current use of insulin  Patient's FSGs are controlled on current medication regimen.  Last A1c reviewed-   Lab Results   Component Value Date    HGBA1C 10.4 (H) 01/30/2024     Most recent fingerstick glucose reviewed-   Recent Labs   Lab 04/30/24  1754 04/30/24  2326 05/01/24  0612 05/01/24  1214   POCTGLUCOSE 129* 149* 150* 119*       Current correctional scale  Medium  Maintain anti-hyperglycemic dose as follows-   Antihyperglycemics (From admission, onward)      Start     Stop Route Frequency Ordered    04/17/24 1200  insulin aspart U-100 pen 4 Units         -- SubQ Every 6 hours 04/17/24 0938    04/17/24 0944  insulin aspart U-100 pen 0-10 Units         -- SubQ Every 6 hours PRN 04/17/24 0938    04/13/24 2100  insulin detemir U-100 (Levemir) pen 27 Units         -- SubQ 2 times daily 04/13/24 0931          Hold Oral hypoglycemics while patient is in the hospital.  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 Pt underwent extensive course, currently still healing from this, but no longer has wound vac. Pt's wound currently covered with bandage. Picture in media tabs    Plan  Wound care        VTE Risk Mitigation (From admission, onward)           Ordered     heparin (porcine) injection 3,000 Units  As needed (PRN)         04/17/24 0825     heparin (porcine) injection 1,000 Units  As needed  (PRN)         04/12/24 0951     heparin (porcine) injection 7,500 Units  Every 8 hours         04/11/24 0252                    Discharge Planning   ZEKE: 5/6/2024     Code Status: Full Code   Is the patient medically ready for discharge?: No    Reason for patient still in hospital (select all that apply): Treatment  Discharge Plan A: Skilled Nursing Facility                  Raoul Harris MD  Department of Hospital Medicine   [facility] - Telemetry Stepdown

## 2024-05-01 NOTE — ASSESSMENT & PLAN NOTE
Patient's FSGs are controlled on current medication regimen.  Last A1c reviewed-   Lab Results   Component Value Date    HGBA1C 10.4 (H) 01/30/2024     Most recent fingerstick glucose reviewed-   Recent Labs   Lab 04/30/24  1754 04/30/24  2326 05/01/24  0612 05/01/24  1214   POCTGLUCOSE 129* 149* 150* 119*       Current correctional scale  Medium  Maintain anti-hyperglycemic dose as follows-   Antihyperglycemics (From admission, onward)    Start     Stop Route Frequency Ordered    04/17/24 1200  insulin aspart U-100 pen 4 Units         -- SubQ Every 6 hours 04/17/24 0938    04/17/24 0944  insulin aspart U-100 pen 0-10 Units         -- SubQ Every 6 hours PRN 04/17/24 0938    04/13/24 2100  insulin detemir U-100 (Levemir) pen 27 Units         -- SubQ 2 times daily 04/13/24 0931        Hold Oral hypoglycemics while patient is in the hospital.  Aspart 3u q4h  Levemir 27u BID  Anticipate discharge with above regimen given well controlled blood glucose while at goal TF rate  MDSSI  POCT glucose q4h, please give remaining sliding scale requirement if above the scheduled 3u  Ordered current regimen with elevation in glucose

## 2024-05-01 NOTE — PT/OT/SLP PROGRESS
Speech Language Pathology    Sarah NADER Saravia  MRN: [id number]    SLP attempted to see Patient for therapy. Patient off the floor for dialysis upon am attempts. ST to continue to monitor and follow up to re-attempt.     5/1/2024

## 2024-05-01 NOTE — PROGRESS NOTES
HD completed, blood returned and  catheter ports flushed with normal saline  and heparin instilled with heparin as indicated. Post B/P 110/77, pulse 106, o2 sat 100% on room air. Patient responsive  to verb stimulus.

## 2024-05-01 NOTE — PROGRESS NOTES
HD initiated, /. B/P 122/79, pulse 97. O2 sat on room air 100%. Patient awake and alert , responsive to verbal stimuli . Net uf goal; set for 1.5 liters as tolerated.

## 2024-05-01 NOTE — ASSESSMENT & PLAN NOTE
Cath intermittently clogging, not resolving with cath-hedy, going for IR venogram 4/30 with possible exchange. S/p exchange with IR on 4/30

## 2024-05-01 NOTE — PT/OT/SLP EVAL
Speech Language Pathology Evaluation  Bedside Swallow    Patient Name:  Sarah Saravia   MRN:  [id number]  Admitting Diagnosis: Acute cystitis with hematuria    Recommendations:                 General Recommendations:  Modified barium swallow study  Diet recommendations:  NPO, NPO   Aspiration Precautions: Continue alternate means of nutrition, Frequent oral care, and Strict aspiration precautions   General Precautions: Standard, aphasia, aspiration, fall, NPO  Communication strategies:  go to room if call light pushed; pt occasionally responding to simple yes/no q's with head nod, but nonverbal    Assessment:     Sarah Saravia is a 53 y.o. female with an SLP diagnosis of Dysphagia.  She also presents with aphasia and severe cognitive-communicative deficits, though SLP services have not formally assessed.     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    Hypermagnesemia 04/11/2024    POA, Mg 3.2  Daily chem       Morbid obesity     Necrotizing fasciitis        Past Surgical History:   Procedure Laterality Date    CLOSURE OF WOUND Right 2/22/2024    Procedure: CLOSURE, WOUND;  Surgeon: Steve Cole MD;  Location: [address];  Service: General;  Laterality: Right;    ESOPHAGOGASTRODUODENOSCOPY W/ PEG N/A 2/22/2024    Procedure: EGD, WITH PEG TUBE INSERTION;  Surgeon: Steve Cole MD;  Location: [address];  Service: General;  Laterality: N/A;    INCISION AND DRAINAGE OF PERIRECTAL REGION N/A 1/29/2024    Procedure: INCISION AND DRAINAGE, PERIRECTAL REGION;  Surgeon: Axel Ramsay MD;  Location: [facility];  Service: General;  Laterality: N/A;    LUMBAR PUNCTURE N/A 2/16/2024    Procedure: Lumbar Puncture;  Surgeon: Macy Boykin;  Location: [facility];  Service: Anesthesiology;  Laterality: N/A;    PLACEMENT, TRIALYSIS CATH Right 1/31/2024    Procedure: INSERTION, CATHETER, TRIPLE LUMEN, HEMODIALYSIS, TEMPORARY;  Surgeon:  Alvin Junior MD;  Location: [facility] OR;  Service: General;  Laterality: Right;    REPLACEMENT OF WOUND VACUUM-ASSISTED CLOSURE DEVICE Right 2/12/2024    Procedure: REPLACEMENT, WOUND VAC;  Surgeon: Mundo Carmona MD;  Location: [facility] OR 2ND FLR;  Service: General;  Laterality: Right;    REPLACEMENT OF WOUND VACUUM-ASSISTED CLOSURE DEVICE N/A 2/15/2024    Procedure: REPLACEMENT, WOUND VAC;  Surgeon: Steve Cole MD;  [facility]: [facility];  Service: General;  Laterality: N/A;    REPLACEMENT OF WOUND VACUUM-ASSISTED CLOSURE DEVICE Right 2/19/2024    Procedure: REPLACEMENT, WOUND VAC;  Surgeon: Steve Cole MD;  Location: [facility];  Service: General;  Laterality: Right;  RLE/groin    REPLACEMENT OF WOUND VACUUM-ASSISTED CLOSURE DEVICE Right 2/22/2024    Procedure: REPLACEMENT, WOUND VAC;  Surgeon: Steve Cole MD;  Location: [facility];  Service: General;  Laterality: Right;    TRACHEOSTOMY N/A 2/22/2024    Procedure: CREATION, TRACHEOSTOMY;  Surgeon: Steve Cole MD;  Location: [facility];  Service: General;  Laterality: N/A;    WOUND DEBRIDEMENT Bilateral 2/2/2024    Procedure: DEBRIDEMENT, WOUND;  Surgeon: Steve Cole MD;  Location: [facility];  Service: General;  Laterality: Bilateral;  Bilateral groin  Possible wound vac placement    WOUND DEBRIDEMENT Right 2/6/2024    Procedure: DEBRIDEMENT, WOUND, replace wound vac, possible closure;  Surgeon: Steve Cole MD;  Location: [facility];  Service: General;  Laterality: Right;  RLE    WOUND DEBRIDEMENT Right 2/9/2024    Procedure: DEBRIDEMENT, WOUND w wound vac change;  Surgeon: Steve Cole MD;  Location: [facility];  Service: General;  Laterality: Right;  RLE    WOUND DEBRIDEMENT Right 2/12/2024    Procedure: R thigh wound debridement;  Surgeon: Mundo Carmona MD;  Location: [facility];  Service: General;  Laterality: Right;    WOUND EXPLORATION Right 1/31/2024    Procedure: IRRIGATION & DEBRIDEMENT,  "WOUND DEBRIDEMENT;  Surgeon: Alvin Junior MD;  Location: [facility];  Service: General;  Laterality: Right;     53-year-old female with a history of CVA now nonverbal with the tracheostomy and PEG, uncontrolled diabetes, 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 Reportedly patient was less responsive than her baseline had an episode of emesis, in the ED she had a CT abdomen/pelvis concerning for cystitis as well as a UA (obtained via straight cath) concerning for a UTI. She was initially started on vancomycin and Zosyn later broadened to meropenem. On presentation her labs were notable for leukocytosis of 17, as well as a lactic acidosis of 3.9 that has improved to 2.4 with the administration of 1 L of IV fluids, we are being cautious with fluid repletion given her end-stage renal disease and oliguric status. Infectious diseases consulted for her history of multiple drug-resistant organisms.     Prior Intubation HX:  pt s/p decannulation after tracheostomy placed 2/22 and removed 4/30.    Modified Barium Swallow: none on file    Chest X-Rays: 4/10/24: Bibasilar subsegmental atelectasis. No focal consolidation.     Prior diet: NPO wit PEG tube placed 2/22    Subjective     "Mm, hmm" pt vocalized with an affirmative tone on a few occasions, but not consistently responding to yes/no q's.     Pain/Comfort:  Pain Rating 1:  (no indications of pain)    Respiratory Status: Room air - pt decannulated on 4/30    Objective:     Oral Musculature Evaluation  Oral Musculature: unable to assess due to poor participation/comprehension  Volitional Cough: unable to follow commands to produce volitionally  Volitional Swallow: unable to follow commands to produce volitionally  Voice Prior to PO Intake: pt vocalized spontaneously when ice chip was received in oral cavity- vocal quality was clear; no verbal " attempts    Bedside Swallow Eval:   Consistencies Assessed:  Thin liquids ice chip x 1, 1/2 tsp x 1, full tsp x 1, cup sip x 1      Oral Phase:   WFL for ice chip and thin water presentations    Pharyngeal Phase:   coughing/choking immediately following cup sip of thin water  delayed swallow initiation    Compensatory Strategies  None    Treatment: Education was provided to pt regarding role of SLP, purpose of swallowing assessment, inability to rule out aspiration at the bedside, possible plan for MBSS to radiographically assess swallowing abilities, and SLP treatment plan and POC.  Pt was unable to demonstrate understanding due to severe communication deficits.  SLP spoke to MD after assessment and received verbal order for MBSS tomorrow. SLP and MD agree MBSS may find that pt is able to receive PO intake for pleasure/quality of life purposes, though likely not as a means of sustaining nutrition.     Goals:   Multidisciplinary Problems       SLP Goals          Problem: SLP    Goal Priority Disciplines Outcome   SLP Goal     SLP    Description: Speech Language Pathology Goals  Goals expected to be met by 5/8:  1. Pt will participate in Modified Barium Swallow Study to determine if safe for oral intake.                                Plan:     Patient to be seen:  4 x/week   Plan of Care expires:  05/31/24  Plan of Care reviewed with:  patient   SLP Follow-Up:  Yes       Discharge recommendations:   (tbd)     Time Tracking:     SLP Treatment Date:   05/01/24  Speech Start Time:  1310  Speech Stop Time:  1326     Speech Total Time (min):  16 min    Billable Minutes: Eval Swallow and Oral Function 8 and Self Care/Home Management Training 8    05/01/2024

## 2024-05-02 LAB
POCT GLUCOSE: 136 MG/DL (ref 70–110)
POCT GLUCOSE: 137 MG/DL (ref 70–110)
POCT GLUCOSE: 146 MG/DL (ref 70–110)
POCT GLUCOSE: 147 MG/DL (ref 70–110)
POCT GLUCOSE: 148 MG/DL (ref 70–110)

## 2024-05-02 PROCEDURE — 25500020 PHARM REV CODE 255: Performed by: HOSPITALIST

## 2024-05-02 PROCEDURE — 25000242 PHARM REV CODE 250 ALT 637 W/ HCPCS

## 2024-05-02 PROCEDURE — 25000003 PHARM REV CODE 250

## 2024-05-02 PROCEDURE — 97530 THERAPEUTIC ACTIVITIES: CPT | Mod: CQ

## 2024-05-02 PROCEDURE — A9698 NON-RAD CONTRAST MATERIALNOC: HCPCS | Performed by: HOSPITALIST

## 2024-05-02 PROCEDURE — 97535 SELF CARE MNGMENT TRAINING: CPT

## 2024-05-02 PROCEDURE — 63600175 PHARM REV CODE 636 W HCPCS

## 2024-05-02 PROCEDURE — 92611 MOTION FLUOROSCOPY/SWALLOW: CPT

## 2024-05-02 PROCEDURE — 25000003 PHARM REV CODE 250: Performed by: INTERNAL MEDICINE

## 2024-05-02 PROCEDURE — 25000003 PHARM REV CODE 250: Performed by: STUDENT IN AN ORGANIZED HEALTH CARE EDUCATION/TRAINING PROGRAM

## 2024-05-02 PROCEDURE — 27000207 HC ISOLATION

## 2024-05-02 PROCEDURE — 20600001 HC STEP DOWN PRIVATE ROOM

## 2024-05-02 RX ORDER — SODIUM CHLORIDE 9 MG/ML
INJECTION, SOLUTION INTRAVENOUS ONCE
Status: COMPLETED | OUTPATIENT
Start: 2024-05-03 | End: 2024-05-03

## 2024-05-02 RX ADMIN — BARIUM SULFATE 15 ML: 0.81 POWDER, FOR SUSPENSION ORAL at 09:05

## 2024-05-02 RX ADMIN — SEVELAMER CARBONATE 0.8 G: 2400 POWDER, FOR SUSPENSION ORAL at 09:05

## 2024-05-02 RX ADMIN — INSULIN ASPART 4 UNITS: 100 INJECTION, SOLUTION INTRAVENOUS; SUBCUTANEOUS at 06:05

## 2024-05-02 RX ADMIN — Medication 500 MG: at 09:05

## 2024-05-02 RX ADMIN — METOPROLOL TARTRATE 25 MG: 25 TABLET, FILM COATED ORAL at 10:05

## 2024-05-02 RX ADMIN — Medication 500 MG: at 10:05

## 2024-05-02 RX ADMIN — ATORVASTATIN CALCIUM 40 MG: 40 TABLET, FILM COATED ORAL at 10:05

## 2024-05-02 RX ADMIN — INSULIN DETEMIR 27 UNITS: 100 INJECTION, SOLUTION SUBCUTANEOUS at 09:05

## 2024-05-02 RX ADMIN — HEPARIN SODIUM 7500 UNITS: 5000 INJECTION INTRAVENOUS; SUBCUTANEOUS at 09:05

## 2024-05-02 RX ADMIN — SEVELAMER CARBONATE 0.8 G: 2400 POWDER, FOR SUSPENSION ORAL at 10:05

## 2024-05-02 RX ADMIN — POLYETHYLENE GLYCOL 3350 17 G: 17 POWDER, FOR SOLUTION ORAL at 09:05

## 2024-05-02 RX ADMIN — PANTOPRAZOLE SODIUM 40 MG: 40 GRANULE, DELAYED RELEASE ORAL at 10:05

## 2024-05-02 RX ADMIN — INSULIN DETEMIR 27 UNITS: 100 INJECTION, SOLUTION SUBCUTANEOUS at 10:05

## 2024-05-02 RX ADMIN — SEVELAMER CARBONATE 0.8 G: 2400 POWDER, FOR SUSPENSION ORAL at 02:05

## 2024-05-02 RX ADMIN — INSULIN ASPART 4 UNITS: 100 INJECTION, SOLUTION INTRAVENOUS; SUBCUTANEOUS at 12:05

## 2024-05-02 RX ADMIN — HEPARIN SODIUM 7500 UNITS: 5000 INJECTION INTRAVENOUS; SUBCUTANEOUS at 02:05

## 2024-05-02 RX ADMIN — HEPARIN SODIUM 7500 UNITS: 5000 INJECTION INTRAVENOUS; SUBCUTANEOUS at 06:05

## 2024-05-02 RX ADMIN — DOCUSATE SODIUM AND SENNOSIDES 1 TABLET: 8.6; 5 TABLET, FILM COATED ORAL at 09:05

## 2024-05-02 RX ADMIN — DOCUSATE SODIUM AND SENNOSIDES 1 TABLET: 8.6; 5 TABLET, FILM COATED ORAL at 10:05

## 2024-05-02 RX ADMIN — ZINC SULFATE 220 MG (50 MG) CAPSULE 220 MG: CAPSULE at 10:05

## 2024-05-02 RX ADMIN — INSULIN ASPART 4 UNITS: 100 INJECTION, SOLUTION INTRAVENOUS; SUBCUTANEOUS at 11:05

## 2024-05-02 RX ADMIN — ASPIRIN 81 MG CHEWABLE TABLET 81 MG: 81 TABLET CHEWABLE at 10:05

## 2024-05-02 RX ADMIN — METOPROLOL TARTRATE 25 MG: 25 TABLET, FILM COATED ORAL at 09:05

## 2024-05-02 RX ADMIN — PSYLLIUM HUSK 1 PACKET: 3.4 POWDER ORAL at 10:05

## 2024-05-02 NOTE — PROCEDURES
Modified Barium Swallow    Patient Name:  Sarah Saravia   MRN:  [id number]      Recommendations:     Recommendations:                General Recommendations:  Dysphagia therapy and Speech language evaluation  Diet recommendations:  NPO, NPO   Aspiration Precautions: Continue alternate means of nutrition, Frequent oral care, and Strict aspiration precautions   General Precautions: Standard, aphasia, fall, NPO  Communication strategies:  yes/no questions only and go to room if call light pushed    Referral     Reason for Referral  Patient was referred for a Modified Barium Swallow Study to assess the efficiency of his/her swallow function, rule out aspiration and make recommendations regarding safe dietary consistencies, effective compensatory strategies, and safe eating environment.     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    Hypermagnesemia 04/11/2024    POA, Mg 3.2  Daily chem       Morbid obesity     Necrotizing fasciitis        Objective:     Current Respiratory Status: 05/02/24    Alert: yes    Cooperative: inconsistent    Follows Directions: inconsistent (mostly no)    Visualization  Patient was seen in the anterior view    Oral Peripheral Examination  Oral Musculature: unable to assess due to poor participation/comprehension  Volitional Cough: unable to follow commands to produce volitionally  Volitional Swallow: unable to follow commands to produce volitionally  Voice Prior to PO Intake: pt vocalized spontaneously when ice chip was received in oral cavity- vocal quality was clear; no verbal attempts    Consistencies Assessed  Thin attempted tsp x 1, cup sip x 1, straw sip x 1   Puree 1/2 tsp x 1 attempted    Oral Preparation/Oral Phase  Prolonged oral holding across consistencies; no initiation of oral propulsion; oral cavity had to be suctioned to remove all boluses presented; max  cues not effective in eliciting desired responses    Pharyngeal Phase   Pt observed to initiate spontaneous swallows x2 when holding pureed bolus. Pt may have swallowed small amounts of pureed applesauce spilling over along base of tongue into vallecula, but no contrast was visible on fluoroscopy.  Unable to assess pharyngeal swallow due to holding and absent initiation of A-P transit.  Suctioning of oral cavity required after every presentation.     Cervical Esophageal Phase  Unable to assess    Assessment:     Impressions    Pt presents with severe oropharyngeal dysphagia c/b prolonged oral holding and no initiation of oral transit of thin liquid and puree trials. Unable to assess pharyngeal phase of swallow under due to no initiation of oral transit or pharyngeal swallow with all PO presentations despite max cues.  Lack of swallowing response appeared to be associated with severe cognitive-communicative and motor planning deficits.  SLP recommends that pt remain strictly NPO with PEG TFs as alernative means of nutrition/hydration/medication at this time.     Prognosis: Guarded    Barriers:  Severe cognitive-communicative deficits; motor planning deficits    Plan  Continue NPO with alternative means of nutrition/hydration/medications. SLP services will continue to assess swallowing abilities if pt demonstrates improve command following and motor planning abilities.  SLP services will also initiate formal speech/language evaluation s/p recent stroke (1/2024) to determine if there is potential for further progress.       Education  Results/recommendations were discussed with patient and significant other. Pt able to occasionally nod in response, but full understanding likely decreased. Pt's SO expressed understanding.Results were discussed with Medical Team who was in agreement with plan.     Goals:   Multidisciplinary Problems       SLP Goals          Problem: SLP    Goal Priority Disciplines Outcome   SLP Goal      SLP    Description: Speech Language Pathology Goals  Goals expected to be met by 5/8:  1. Pt will participate in Modified Barium Swallow Study to determine if safe for oral intake.                                Plan:   Patient to be seen:  Therapy Frequency: 3 x/week   Plan of Care expires:  05/31/24  Plan of Care reviewed with:  patient, significant other        Discharge recommendations:  Moderate Intensity Therapy     Time Tracking:   SLP Treatment Date:   05/02/24  Speech Start Time:  0908  Speech Stop Time:  0935     Speech Total Time (min):  27 min    05/02/2024

## 2024-05-02 NOTE — SUBJECTIVE & OBJECTIVE
Interval History: NAEON. Pending placement      Objective:     Vital Signs (Most Recent):  Temp: 96.5 °F (35.8 °C) (05/02/24 1134)  Pulse: 87 (05/02/24 1134)  Resp: 16 (05/02/24 1134)  BP: 119/84 (05/02/24 1134)  SpO2: 96 % (05/02/24 1134) Vital Signs (24h Range):  Temp:  [96.5 °F (35.8 °C)-99.9 °F (37.7 °C)] 96.5 °F (35.8 °C)  Pulse:  [] 87  Resp:  [16-20] 16  SpO2:  [94 %-100 %] 96 %  BP: (107-148)/(61-88) 119/84     Weight: 122.5 kg (270 lb 1 oz)  Body mass index is 42.3 kg/m².    Intake/Output Summary (Last 24 hours) at 5/2/2024 1343  Last data filed at 5/2/2024 1107  Gross per 24 hour   Intake 1480 ml   Output 30 ml   Net 1450 ml         Physical Exam  Vitals and nursing note reviewed.   HENT:      Head: Normocephalic and atraumatic.   Neck:      Comments: Trach removed  Cardiovascular:      Rate and Rhythm: Normal rate and regular rhythm.      Heart sounds: Normal heart sounds.   Pulmonary:      Effort: Pulmonary effort is normal. No respiratory distress.      Breath sounds: Normal breath sounds.   Abdominal:      General: Abdomen is flat. There is no distension.      Palpations: Abdomen is soft.      Tenderness: There is no abdominal tenderness.      Comments: PEG tube   Musculoskeletal:      Right lower leg: No edema.      Left lower leg: No edema.      Comments: Moves UEs spontaneously   Skin:     General: Skin is warm and dry.      Findings: Wound present.   Neurological:      General: No focal deficit present.      Mental Status: She is alert.      Comments: Nonverbal, not following commands             Significant Labs: All pertinent labs within the past 24 hours have been reviewed.    Significant Imaging: I have reviewed all pertinent imaging results/findings within the past 24 hours.

## 2024-05-02 NOTE — PT/OT/SLP PROGRESS
Physical Therapy Treatment    Patient Name:  Sarah Saravia   MRN:  [id number]    Recommendations:     Discharge Recommendations: Moderate Intensity Therapy  Discharge Equipment Recommendations: to be determined by next level of care  Barriers to discharge: Pt requiring increased skilled assistance at current time.     Assessment:     Sarah Saravia is a 53 y.o. female admitted with a medical diagnosis of Acute cystitis with hematuria.  She presents with the following impairments/functional limitations: weakness, impaired endurance, impaired self care skills, impaired functional mobility, decreased coordination, decreased upper extremity function, decreased lower extremity function, decreased safety awareness, pain, impaired coordination requiring total assistance and verbal cues for bed mob, scooting to EOB/HOB due to weakness, pain, fatigue.   In light of pt's current functional level and deficits, it is anticipated that pt will need to participate in a moderate intensity rehab program consisting of PT and OT in order to achieve full rehab potential to return to previous level of function and roles.  Pt remains motivated to participate in PT session and will cont to benefit from skilled PT intervention..    Rehab Prognosis: Good; patient would benefit from acute skilled PT services to address these deficits and reach maximum level of function.    Recent Surgery: * No surgery found *      Plan:     During this hospitalization, patient to be seen 3 x/week to address the identified rehab impairments via therapeutic activities, therapeutic exercises, neuromuscular re-education and progress toward the following goals:    Plan of Care Expires:  05/22/24    Subjective     Chief Complaint: pain  Pain/Comfort:  Pain Rating 1:  (Pt with facial grimmacing and moaning during movement)  Location - Side 1: Left  Location - Orientation 1: generalized  Location 1: knee  Pain Addressed 1: Reposition, Distraction, Cessation of  Activity  Pain Rating Post-Intervention 1:  (Pt unable to rate)      Objective:     Communicated with nurse (Braxton) prior to session.  Patient found  R side lying with HOB at 45* angle  with PEG Tube, pressure relief boots (pressure relief mattress, Fall monitor camera.  Daughter present) upon PT entry to room.     General Precautions: Standard, aphasia, fall, NPO  Orthopedic Precautions: N/A  Braces: N/A  Respiratory Status: Room air     Functional Mobility:  Bed Mobility:     Rolling Left:  total assistance and of 2 persons  Rolling Right: total assistance and of 2 persons  Scooting: anteriorly to the EOB with max/total A with use of bedpads; to the HOB dependent of 2 with drawsheet  Supine to Sit: total A of 2 for trunk elevation and LE's, exiting on the R side, HOB at 30* angle  Sit to Supine: total A of 2 for trunk and LE's, HOB flat  Balance: static sitting at the EOB with SBA with B UE support; dynamic sitting at the EOB while reaching with UE's with mod/min A for trunk with single UE support.  Total sitting time 15 min      AM-PAC 6 CLICK MOBILITY  Turning over in bed (including adjusting bedclothes, sheets and blankets)?: 1  Sitting down on and standing up from a chair with arms (e.g., wheelchair, bedside commode, etc.): 1  Moving from lying on back to sitting on the side of the bed?: 1  Moving to and from a bed to a chair (including a wheelchair)?: 1  Need to walk in hospital room?: 1  Climbing 3-5 steps with a railing?: 1  Basic Mobility Total Score: 6       Treatment & Education:  Patient provided with daily orientation and goals of this PT session. They were educated to call for assistance and to transfer with hospital staff only.  Also, pt was educated on the effects of prolonged immobility and the importance of performing OOB activity and exercises to promote healing and reduce recovery time    Patient left  L side lying with HOB at 40* angle  with all lines intact, call button in reach, nurse  notified, and daughter present..    GOALS:   Multidisciplinary Problems       Physical Therapy Goals          Problem: Physical Therapy    Goal Priority Disciplines Outcome Goal Variances Interventions   Physical Therapy Goal     PT, PT/OT Progressing     Description: Goals to be met by: 24     Patient will increase functional independence with mobility by performin. Supine to sit with Maximum Assistance  2. Sit to supine with Maximum Assistance  3. Rolling to Left and Right with Moderate Assistance.  4. Sitting at edge of bed 5 minutes with Moderate Assistance- MET , monitor consistency  5. Lower extremity exercise program x20 reps per handout, with assistance as needed                         Time Tracking:     PT Received On: 24  PT Start Time: 1409     PT Stop Time: 1447  PT Total Time (min): 38 min     Billable Minutes: Therapeutic Activity 38    Treatment Type: Treatment  PT/PTA: PTA     Number of PTA visits since last PT visit: 2024

## 2024-05-02 NOTE — NURSING
[facility] med 4 team notified that pt has 2 wounds on R pubis/groin area and a wound posterior left thigh that has sutures and wraps around the to front. Wound care reconsulted. Unknown if the current wound care orders are applicable to both groin wounds.

## 2024-05-02 NOTE — PLAN OF CARE
Problem: Infection  Goal: Absence of Infection Signs and Symptoms  Outcome: Progressing     Problem: Skin Injury Risk Increased  Goal: Skin Health and Integrity  Outcome: Progressing     Problem: Adult Inpatient Plan of Care  Goal: Plan of Care Review  Outcome: Progressing  Goal: Patient-Specific Goal (Individualized)  Outcome: Progressing  Goal: Absence of Hospital-Acquired Illness or Injury  Outcome: Progressing  Goal: Optimal Comfort and Wellbeing  Outcome: Progressing  Goal: Readiness for Transition of Care  Outcome: Progressing     Problem: Bariatric Environmental Safety  Goal: Safety Maintained with Care  Outcome: Progressing     Problem: Device-Related Complication Risk (Hemodialysis)  Goal: Safe, Effective Therapy Delivery  Outcome: Progressing     Problem: Hemodynamic Instability (Hemodialysis)  Goal: Effective Tissue Perfusion  Outcome: Progressing     Problem: Infection (Hemodialysis)  Goal: Absence of Infection Signs and Symptoms  Outcome: Progressing     Problem: Diabetes Comorbidity  Goal: Blood Glucose Level Within Targeted Range  Outcome: Progressing     Problem: Adjustment to Illness (Sepsis/Septic Shock)  Goal: Optimal Coping  Outcome: Progressing     Problem: Glycemic Control Impaired (Sepsis/Septic Shock)  Goal: Blood Glucose Level Within Desired Range  Outcome: Progressing     Problem: Infection Progression (Sepsis/Septic Shock)  Goal: Absence of Infection Signs and Symptoms  Outcome: Progressing     Problem: Nutrition Impaired (Sepsis/Septic Shock)  Goal: Optimal Nutrition Intake  Outcome: Progressing     Problem: Fall Injury Risk  Goal: Absence of Fall and Fall-Related Injury  Outcome: Progressing     Problem: Adjustment to Illness (Chronic Kidney Disease)  Goal: Optimal Coping with Chronic Illness  Outcome: Progressing     Problem: Electrolyte Imbalance (Chronic Kidney Disease)  Goal: Electrolyte Balance  Outcome: Progressing     Problem: Fluid Volume Excess (Chronic Kidney Disease)  Goal:  Fluid Balance  Outcome: Progressing     Problem: Functional Decline (Chronic Kidney Disease)  Goal: Optimal Functional Ability  Outcome: Progressing     Problem: Hematologic Alteration (Chronic Kidney Disease)  Goal: Absence of Anemia Signs and Symptoms  Outcome: Progressing     Problem: Oral Intake Inadequate (Chronic Kidney Disease)  Goal: Optimal Oral Intake  Outcome: Progressing     Problem: Pain (Chronic Kidney Disease)  Goal: Acceptable Pain Control  Outcome: Progressing     Problem: Renal Function Impairment (Chronic Kidney Disease)  Goal: Minimize Renal Failure Effects  Outcome: Progressing     Problem: Restraint, Nonviolent  Goal: Absence of Harm or Injury  Outcome: Progressing

## 2024-05-02 NOTE — ASSESSMENT & PLAN NOTE
Patient's FSGs are controlled on current medication regimen.  Last A1c reviewed-   Lab Results   Component Value Date    HGBA1C 10.4 (H) 01/30/2024     Most recent fingerstick glucose reviewed-   Recent Labs   Lab 05/01/24  2119 05/02/24  0027 05/02/24  0602 05/02/24  1150   POCTGLUCOSE 145* 148* 147* 137*       Current correctional scale  Medium  Maintain anti-hyperglycemic dose as follows-   Antihyperglycemics (From admission, onward)    Start     Stop Route Frequency Ordered    04/17/24 1200  insulin aspart U-100 pen 4 Units         -- SubQ Every 6 hours 04/17/24 0938    04/17/24 0944  insulin aspart U-100 pen 0-10 Units         -- SubQ Every 6 hours PRN 04/17/24 0938    04/13/24 2100  insulin detemir U-100 (Levemir) pen 27 Units         -- SubQ 2 times daily 04/13/24 0931        Hold Oral hypoglycemics while patient is in the hospital.  Aspart 3u q4h  Levemir 27u BID  Anticipate discharge with above regimen given well controlled blood glucose while at goal TF rate  MDSSI  POCT glucose q4h, please give remaining sliding scale requirement if above the scheduled 3u  Ordered current regimen with elevation in glucose

## 2024-05-02 NOTE — PLAN OF CARE
PT alert unable to tell orientation as PT has not spoke this shift. All needs must be anticipated.  All needs met.  She has remained free of falls and injuries. Safety eduction and plan of care reviewed with PT but unable to tell if PT understands. Feeding pump turned off at MDN d/t upcoming procedure.  Bed is low and locked with call light with in easy reach.

## 2024-05-02 NOTE — PROGRESS NOTES
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 History was given from patient's daughter. She was undergoing dialysis today and noticed she was lethargic, less alert than usual self. Pt completed dialysis and still not acting herself. EMS was called, fever of a 100.  On chart review, she did have an episode of large volume emesis around 1700.  Per EMS, she had a slightly elevated temp 100.0°F, glucose 300s.     In the ED: UA 2+ leuks, >100 WBC, many bacteria, WBC 17, CT abd/pelvis concerning for cystitis. Given vanc/zosyn    Overview/Hospital Course:  Patient was admitted to Hospital Medicine service for medical management and evaluation of urosepsis. Patient was continued on vanc/zosyn. Vascular Neurology consulted concerning mental status change with the recommendation to initiate ASA 81 QD and to obtain an MRI to r/o new stroke. Imaging pending. Nephrology was consulted for regularly scheduled dialysis. Afternoon of 4/12, patient was unable to tolerate filtration of volume during dialsis 2/2 hypotension. Patient received 500cc bolus and was transferred back to floor after finishing the session without volume removed. Will monitor for signs of volume overload. Tailoring insulin regimen while uptitrating tube feeds to goal rate. Staph Epi in all 4 bottles; ID with recommendation to continue Vanc & de-escalate meropenem to ertapenem on 04/15 through 4/16. Patient completed IV course of UTI coverage. Patient tolerated volume removal well in dialysis  throughout the rest of her hospital stay. Pending discharge to LTACH pending bed availability. Patient without BM with one episode of vomiting overnight 4/17. KUB with bowel gas and low concern for obstruction with no transition point noted. Escalating bowel regimen. Pending placement as of 4/22. Pulm consult placed to evaluate for possible trach downsize & eventual decannulation to assist placement if safe. Trach capping trial per pulm for 48h and will assess for decannulation on Monday. DVT studies negative. Pulm successfully decannulated pt 4/30, IR exchanged TDC. Pending swallow study with SLP and waiting for dispo options. Pt failed swallow study, placement pending.    Interval History: NAEON. Pending placement      Objective:     Vital Signs (Most Recent):  Temp: 96.5 °F (35.8 °C) (05/02/24 1134)  Pulse: 87 (05/02/24 1134)  Resp: 16 (05/02/24 1134)  BP: 119/84 (05/02/24 1134)  SpO2: 96 % (05/02/24 1134) Vital Signs (24h Range):  Temp:  [96.5 °F (35.8 °C)-99.9 °F (37.7 °C)] 96.5 °F (35.8 °C)  Pulse:  [] 87  Resp:  [16-20] 16  SpO2:  [94 %-100 %] 96 %  BP: (107-148)/(61-88) 119/84     Weight: 122.5 kg (270 lb 1 oz)  Body mass index is 42.3 kg/m².    Intake/Output Summary (Last 24 hours) at 5/2/2024 1343  Last data filed at 5/2/2024 1107  Gross per 24 hour   Intake 1480 ml   Output 30 ml   Net 1450 ml         Physical Exam  Vitals and nursing note reviewed.   HENT:      Head: Normocephalic and atraumatic.   Neck:      Comments: Trach removed  Cardiovascular:      Rate and Rhythm: Normal rate and regular rhythm.      Heart sounds: Normal heart sounds.   Pulmonary:      Effort: Pulmonary effort is normal. No respiratory distress.      Breath sounds: Normal breath sounds.   Abdominal:      General: Abdomen is flat. There is no distension.      Palpations: Abdomen is soft.      Tenderness: There is no abdominal tenderness.      Comments: PEG tube   Musculoskeletal:      Right lower leg: No edema.      Left  lower leg: No edema.      Comments: Moves UEs spontaneously   Skin:     General: Skin is warm and dry.      Findings: Wound present.   Neurological:      General: No focal deficit present.      Mental Status: She is alert.      Comments: Nonverbal, not following commands             Significant Labs: All pertinent labs within the past 24 hours have been reviewed.    Significant Imaging: I have reviewed all pertinent imaging results/findings within the past 24 hours.    Assessment/Plan:      * Acute cystitis with hematuria  Pt presenting with AMS and worsening lethargy. Pt is non-verbal at baseline, does not follow commands, but was less responsive than normal. Pt found to have a fever. Urinary source suspected based off of urine and CT abd/pelvis. Pt has many prior resistant bacterial infections including urinary sources. Has prior with sensitivity to zosyn and has also improved off ED dose of vanc/zosyn. Lactic elevated a 3.8->3.49 prior to completion of fluid bolus 500ml.    Patient with new fever and tachycardia on 4/25. Low threshold to initiate additional infectious workup and repeat UA.     Plan  S/p course of vanc/ertapenem per ID. Course completed 4/16.  Daily cbc  Contact precautions    *on contact precautions indefinitely while patient still makes urine given pt incontinent per infection control    Complication of vascular dialysis catheter  Cath intermittently clogging, not resolving with cath-hedy, going for IR venogram 4/30 with possible exchange. S/p exchange with IR on 4/30      Constipation  RESOLVED with Bowel regimen  Patient without BM x5d. One episode of vomitus night of 4/17. Some concern for bowel obstruction. Tolerating continuous tube feeds at goal rate with 0cc on residuals. Physical exam with bowel sounds, soft nontender abdomen. KUB without concern for obstruction with bowel gas, lack of transition point.    Patient now with regular bowel movements on bowel regimen.     Plan  - Miralax BID,  Senna BID  - One time mag citrate per PEG ordered  - compazine PRN    Moderate malnutrition  Nutrition consulted. Most recent weight and BMI monitored-     Measurements:  Wt Readings from Last 1 Encounters:   05/01/24 122.5 kg (270 lb 1 oz)   Body mass index is 42.3 kg/m².    Patient has been screened and assessed by RD.    Malnutrition Type:  Context: acute illness or injury  Level: moderate    Malnutrition Characteristic Summary:  Weight Loss (Malnutrition): 10% in 6 months  Subcutaneous Fat (Malnutrition): mild depletion  Muscle Mass (Malnutrition): mild depletion  Fluid Accumulation (Malnutrition): mild    Interventions/Recommendations (treatment strategy):  1.    -- TF  -- going for swallow study with SLP to assess possibility of pleasure oral feedings    Prolonged QT interval  Qtc 526, limit Qtc prolonging drugs as able      Spastic hemiplegia of right dominant side as late effect of cerebrovascular disease   This patient has Chronic right hemiplegia due to stroke. Physical therapy services has not been scheduled. Continue all standard measures for pressure injury prevention and consult wound care for any wounds (chronic or acute).    ESRD (end stage renal disease) on dialysis  Creatine stable for now. BMP reviewed- noted Estimated Creatinine Clearance: 15 mL/min (A) (based on SCr of 5.9 mg/dL (H)). according to latest data. Based on current GFR, CKD stage is end stage.  Monitor UOP and serial BMP and adjust therapy as needed. Renally dose meds. Avoid nephrotoxic medications and procedures.    -- HD MWF  -- nephro following  -- sevelamer TID    Status post tracheostomy  Resolved, decannulated 4/30    Acute encephalopathy  Pt is non-verbal and does not follow commands at baseline. Vascular Neurology consulted given acute change from baseline. MRI with concern for evolution of prior strokes with noted differential of T2 hyperintensities including vasculitis vs demyelination. Discussed with Vascular Neurology;  low concern for ongoing vasculitis/demyelination, likely represents typical evolution of prior infarcts.    Patient at baseline as of 4/22.     Plan  - STAT head imaging for concern of new change in mental status/focal deficit    Type 2 diabetes mellitus with hyperglycemia, with long-term current use of insulin  Patient's FSGs are controlled on current medication regimen.  Last A1c reviewed-   Lab Results   Component Value Date    HGBA1C 10.4 (H) 01/30/2024     Most recent fingerstick glucose reviewed-   Recent Labs   Lab 05/01/24  2119 05/02/24  0027 05/02/24  0602 05/02/24  1150   POCTGLUCOSE 145* 148* 147* 137*       Current correctional scale  Medium  Maintain anti-hyperglycemic dose as follows-   Antihyperglycemics (From admission, onward)      Start     Stop Route Frequency Ordered    04/17/24 1200  insulin aspart U-100 pen 4 Units         -- SubQ Every 6 hours 04/17/24 0938    04/17/24 0944  insulin aspart U-100 pen 0-10 Units         -- SubQ Every 6 hours PRN 04/17/24 0938    04/13/24 2100  insulin detemir U-100 (Levemir) pen 27 Units         -- SubQ 2 times daily 04/13/24 0931          Hold Oral hypoglycemics while patient is in the hospital.  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 Pt underwent extensive course, currently still healing from this, but no longer has wound vac. Pt's wound currently covered with bandage. Picture in media tabs    Plan  Wound care        VTE Risk Mitigation (From admission, onward)           Ordered     heparin (porcine) injection 3,000 Units  As needed (PRN)         04/17/24 0825     heparin (porcine) injection 1,000 Units  As needed (PRN)          04/12/24 0951     heparin (porcine) injection 7,500 Units  Every 8 hours         04/11/24 0252                    Discharge Planning   ZEKE: 5/6/2024     Code Status: Full Code   Is the patient medically ready for discharge?: No    Reason for patient still in hospital (select all that apply): Patient trending condition  Discharge Plan A: Skilled Nursing Facility                  Osito Dos Santos MD  Department of Hospital Medicine   [facility] - Telemetry Stepdown

## 2024-05-02 NOTE — CONSULTS
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    Hypermagnesemia 04/11/2024    POA, Mg 3.2  Daily chem       Morbid obesity     Necrotizing fasciitis        Social History     Socioeconomic History    Marital status: Single   Tobacco Use    Smoking status: Unknown     Social Determinants of Health     Financial Resource Strain: Patient Unable To Answer (3/14/2024)    Overall Financial Resource Strain (CARDIA)     Difficulty of Paying Living Expenses: Patient unable to answer   Recent Concern: Financial Resource Strain - High Risk (3/9/2024)    Overall Financial Resource Strain (CARDIA)     Difficulty of Paying Living Expenses: Very hard   Food Insecurity: Patient Unable To Answer (3/14/2024)    Hunger Vital Sign     Worried About Running Out of Food in the Last Year: Patient unable to answer     Ran Out of Food in the Last Year: Patient unable to answer   Transportation Needs: Patient Unable To Answer (3/14/2024)    PRAPARE - Transportation     Lack of Transportation (Medical): Patient unable to answer     Lack of Transportation (Non-Medical): Patient unable to answer   Physical Activity: Inactive (3/13/2024)    Exercise Vital Sign     Days of Exercise per Week: 0 days     Minutes of Exercise per Session: 0 min   Stress: Patient Unable To Answer (3/14/2024)    [employer] [place] [employer] - Occupational Stress Questionnaire     Feeling of Stress : Patient unable to answer   Housing Stability: Patient Unable To Answer (3/14/2024)    Housing Stability Vital Sign     Unable to Pay for Housing in the Last Year: Patient unable to answer     Number of Places Lived in the Last Year: 1     Unstable Housing in the Last Year: Patient unable to answer   Recent Concern: Housing Stability - High  Risk (3/9/2024)    Housing Stability Vital Sign     Unable to Pay for Housing in the Last Year: Yes     Unstable Housing in the Last Year: No       Precautions:     Allergies as of 04/10/2024    (No Known Allergies)       WO Assessment Details/Treatment     Patient seen for wound care consultation. Patient seen for FU skin assmt and local wound care to R pubis/ groin.     Reviewed chart for this encounter.   See Flow Sheet for findings.      RECOMMENDATIONS:Pt awake with support person present. Remains nonverbal- good eye contact and moves upper extremities. Requires max assist to turn and maintain position needed for care. Skin remains clear- no PI present  upon TBSA skin assmt. Skin folds without skin issue. Local wound care continued to R pubis area with improvement noted- R groin now resolved- linear moist scar present- continued dressing for added protection/ prevention from re opening. RN re-consulted inpatient wound care for order clarification.     RECOMMENDATIONS:  R groin- 1)clean with Vashe solution and pat dry 2)apply silver hydrofiber (Aquacel Ag) to open area 3)cover with border foam dressing- sacrum 7x8 cm. Perform care MWF- or change dressing if becomes saturated. If dressing is changed daily- please re consult IP wound care.      -continue PI prevention interventions.    Discussed POC with patient and primary nurse.   See EMR for orders & patient education.    Discussed nutrition and the role of protein in wound healing with the patient. Instructed patient to optimize protein for wound healing.    Bedside nursing to continue care & monitoring.  Bedside nursing to maintain pressure injury prevention interventions. LUIS M bed surface in place. EHOB boots.  Current documented Tomas score is 14 with a nutrition sub scale score of 3.     No other issues or concerns at this time. Will continue to follow.       05/02/24 1200   WOCN Assessment   WOCN Total Time (mins) 30   Visit Date 05/02/24   Visit Time  1200   Consult Type New;Follow Up   WOCN Speciality Wound   Intervention assessed;changed;applied;chart review;coordination of care;orders   Teaching on-going        Incision/Site 01/29/24 2300 Right Pubis   Date First Assessed/Time First Assessed: 01/29/24 2300   Side: Right  Location: Pubis  Additional Comments: right groin area left open with betadine soaked Kerlix packing/4x4's and ABD's   Wound Image     Dressing Appearance Dry;Intact;Clean   Drainage Amount None   Drainage Characteristics/Odor No odor   Appearance Intact;Pink   Care Cleansed with:;Antimicrobial agent   Dressing Applied;Silver;Hydrocolloid;Foam   Dressing Change Due 05/04/24       Orders placed.   Severino TIWARIN, RN  05/02/2024

## 2024-05-03 LAB
ALBUMIN SERPL BCP-MCNC: 3 G/DL (ref 3.5–5.2)
ANION GAP SERPL CALC-SCNC: 14 MMOL/L (ref 8–16)
BUN SERPL-MCNC: 44 MG/DL (ref 6–20)
CALCIUM SERPL-MCNC: 10.2 MG/DL (ref 8.7–10.5)
CHLORIDE SERPL-SCNC: 93 MMOL/L (ref 95–110)
CO2 SERPL-SCNC: 21 MMOL/L (ref 23–29)
CREAT SERPL-MCNC: 5.2 MG/DL (ref 0.5–1.4)
EST. GFR  (NO RACE VARIABLE): 9.3 ML/MIN/1.73 M^2
GLUCOSE SERPL-MCNC: 132 MG/DL (ref 70–110)
PHOSPHATE SERPL-MCNC: 3.8 MG/DL (ref 2.7–4.5)
POCT GLUCOSE: 150 MG/DL (ref 70–110)
POCT GLUCOSE: 153 MG/DL (ref 70–110)
POCT GLUCOSE: 154 MG/DL (ref 70–110)
POCT GLUCOSE: 168 MG/DL (ref 70–110)
POTASSIUM SERPL-SCNC: 3.7 MMOL/L (ref 3.5–5.1)
SODIUM SERPL-SCNC: 128 MMOL/L (ref 136–145)

## 2024-05-03 PROCEDURE — 25000003 PHARM REV CODE 250: Performed by: STUDENT IN AN ORGANIZED HEALTH CARE EDUCATION/TRAINING PROGRAM

## 2024-05-03 PROCEDURE — 25000003 PHARM REV CODE 250

## 2024-05-03 PROCEDURE — 63600175 PHARM REV CODE 636 W HCPCS: Performed by: NURSE PRACTITIONER

## 2024-05-03 PROCEDURE — 80100016 HC MAINTENANCE HEMODIALYSIS

## 2024-05-03 PROCEDURE — 36415 COLL VENOUS BLD VENIPUNCTURE: CPT

## 2024-05-03 PROCEDURE — 80100014 HC HEMODIALYSIS 1:1

## 2024-05-03 PROCEDURE — 94761 N-INVAS EAR/PLS OXIMETRY MLT: CPT

## 2024-05-03 PROCEDURE — 20600001 HC STEP DOWN PRIVATE ROOM

## 2024-05-03 PROCEDURE — 97530 THERAPEUTIC ACTIVITIES: CPT

## 2024-05-03 PROCEDURE — 80069 RENAL FUNCTION PANEL: CPT

## 2024-05-03 PROCEDURE — 92523 SPEECH SOUND LANG COMPREHEN: CPT

## 2024-05-03 PROCEDURE — 97112 NEUROMUSCULAR REEDUCATION: CPT

## 2024-05-03 PROCEDURE — 25000003 PHARM REV CODE 250: Performed by: NURSE PRACTITIONER

## 2024-05-03 PROCEDURE — 97535 SELF CARE MNGMENT TRAINING: CPT

## 2024-05-03 PROCEDURE — 27000207 HC ISOLATION

## 2024-05-03 PROCEDURE — 63600175 PHARM REV CODE 636 W HCPCS

## 2024-05-03 PROCEDURE — 92526 ORAL FUNCTION THERAPY: CPT

## 2024-05-03 PROCEDURE — 25000003 PHARM REV CODE 250: Performed by: INTERNAL MEDICINE

## 2024-05-03 RX ADMIN — HEPARIN SODIUM 7500 UNITS: 5000 INJECTION INTRAVENOUS; SUBCUTANEOUS at 03:05

## 2024-05-03 RX ADMIN — HEPARIN SODIUM 7500 UNITS: 5000 INJECTION INTRAVENOUS; SUBCUTANEOUS at 10:05

## 2024-05-03 RX ADMIN — INSULIN ASPART 4 UNITS: 100 INJECTION, SOLUTION INTRAVENOUS; SUBCUTANEOUS at 06:05

## 2024-05-03 RX ADMIN — PANTOPRAZOLE SODIUM 40 MG: 40 GRANULE, DELAYED RELEASE ORAL at 12:05

## 2024-05-03 RX ADMIN — ATORVASTATIN CALCIUM 40 MG: 40 TABLET, FILM COATED ORAL at 12:05

## 2024-05-03 RX ADMIN — SEVELAMER CARBONATE 0.8 G: 2400 POWDER, FOR SUSPENSION ORAL at 10:05

## 2024-05-03 RX ADMIN — INSULIN ASPART 4 UNITS: 100 INJECTION, SOLUTION INTRAVENOUS; SUBCUTANEOUS at 01:05

## 2024-05-03 RX ADMIN — METOPROLOL TARTRATE 25 MG: 25 TABLET, FILM COATED ORAL at 12:05

## 2024-05-03 RX ADMIN — ASPIRIN 81 MG CHEWABLE TABLET 81 MG: 81 TABLET CHEWABLE at 12:05

## 2024-05-03 RX ADMIN — INSULIN ASPART 4 UNITS: 100 INJECTION, SOLUTION INTRAVENOUS; SUBCUTANEOUS at 11:05

## 2024-05-03 RX ADMIN — SEVELAMER CARBONATE 0.8 G: 2400 POWDER, FOR SUSPENSION ORAL at 03:05

## 2024-05-03 RX ADMIN — SODIUM CHLORIDE: 9 INJECTION, SOLUTION INTRAVENOUS at 07:05

## 2024-05-03 RX ADMIN — ERYTHROPOIETIN 6100 UNITS: 10000 INJECTION, SOLUTION INTRAVENOUS; SUBCUTANEOUS at 08:05

## 2024-05-03 RX ADMIN — HEPARIN SODIUM 7500 UNITS: 5000 INJECTION INTRAVENOUS; SUBCUTANEOUS at 06:05

## 2024-05-03 RX ADMIN — INSULIN ASPART 2 UNITS: 100 INJECTION, SOLUTION INTRAVENOUS; SUBCUTANEOUS at 12:05

## 2024-05-03 RX ADMIN — HEPARIN SODIUM 1000 UNITS: 1000 INJECTION, SOLUTION INTRAVENOUS; SUBCUTANEOUS at 11:05

## 2024-05-03 RX ADMIN — INSULIN ASPART 4 UNITS: 100 INJECTION, SOLUTION INTRAVENOUS; SUBCUTANEOUS at 12:05

## 2024-05-03 RX ADMIN — HEPARIN SODIUM 3000 UNITS: 1000 INJECTION, SOLUTION INTRAVENOUS; SUBCUTANEOUS at 08:05

## 2024-05-03 RX ADMIN — METOPROLOL TARTRATE 25 MG: 25 TABLET, FILM COATED ORAL at 10:05

## 2024-05-03 RX ADMIN — POLYETHYLENE GLYCOL 3350 17 G: 17 POWDER, FOR SOLUTION ORAL at 12:05

## 2024-05-03 RX ADMIN — Medication 500 MG: at 12:05

## 2024-05-03 RX ADMIN — INSULIN DETEMIR 27 UNITS: 100 INJECTION, SOLUTION SUBCUTANEOUS at 10:05

## 2024-05-03 RX ADMIN — INSULIN ASPART 4 UNITS: 100 INJECTION, SOLUTION INTRAVENOUS; SUBCUTANEOUS at 07:05

## 2024-05-03 RX ADMIN — INSULIN DETEMIR 27 UNITS: 100 INJECTION, SOLUTION SUBCUTANEOUS at 12:05

## 2024-05-03 RX ADMIN — ZINC SULFATE 220 MG (50 MG) CAPSULE 220 MG: CAPSULE at 12:05

## 2024-05-03 RX ADMIN — DOCUSATE SODIUM AND SENNOSIDES 1 TABLET: 8.6; 5 TABLET, FILM COATED ORAL at 12:05

## 2024-05-03 RX ADMIN — Medication 500 MG: at 10:05

## 2024-05-03 NOTE — SUBJECTIVE & OBJECTIVE
Interval History: NAEON. Pending placement      Objective:     Vital Signs (Most Recent):  Temp: 98 °F (36.7 °C) (05/03/24 0736)  Pulse: 105 (05/03/24 1100)  Resp: 18 (05/03/24 0736)  BP: 120/69 (05/03/24 1100)  SpO2: 100 % (05/03/24 0736) Vital Signs (24h Range):  Temp:  [96.5 °F (35.8 °C)-100 °F (37.8 °C)] 98 °F (36.7 °C)  Pulse:  [] 105  Resp:  [16-19] 18  SpO2:  [94 %-100 %] 100 %  BP: (104-139)/(56-89) 120/69     Weight: 122.5 kg (270 lb 1 oz)  Body mass index is 42.3 kg/m².    Intake/Output Summary (Last 24 hours) at 5/3/2024 1128  Last data filed at 5/3/2024 0624  Gross per 24 hour   Intake 1678 ml   Output --   Net 1678 ml         Physical Exam  Vitals and nursing note reviewed.   HENT:      Head: Normocephalic and atraumatic.   Neck:      Comments: Trach removed  Cardiovascular:      Rate and Rhythm: Normal rate and regular rhythm.      Heart sounds: Normal heart sounds.   Pulmonary:      Effort: Pulmonary effort is normal. No respiratory distress.      Breath sounds: Normal breath sounds.   Abdominal:      General: Abdomen is flat. There is no distension.      Palpations: Abdomen is soft.      Tenderness: There is no abdominal tenderness.      Comments: PEG tube   Musculoskeletal:      Right lower leg: No edema.      Left lower leg: No edema.      Comments: Moves UEs spontaneously   Skin:     General: Skin is warm and dry.      Findings: Wound present.   Neurological:      General: No focal deficit present.      Mental Status: She is alert.      Comments: Nonverbal, not following commands             Significant Labs: All pertinent labs within the past 24 hours have been reviewed.    Significant Imaging: I have reviewed all pertinent imaging results/findings within the past 24 hours.

## 2024-05-03 NOTE — NURSING
Nurses Note -- 4 Eyes      5/2/24  1900      Skin assessed during: Q Shift Change      [] No Altered Skin Integrity Present    []Prevention Measures Documented      [x] Yes- Altered Skin Integrity Present or Discovered   [] LDA Added if Not in Epic (Describe Wound)   [] New Altered Skin Integrity was Present on Admit and Documented in LDA   [] Wound Image Taken  Wounds are already on LDA  Wound Care Consulted? No    Attending Nurse:  ELISABETH Hampton    Second RN/Staff Member:  ELISABETH Limon

## 2024-05-03 NOTE — PROGRESS NOTES
[facility] HEMODIALYSIS NOTE     Patient currently on hemodialysis for removal of uremic toxins .     Patient seen and evaluated on hemodialysis, tolerating treatment, see HD flowsheet for vitals and assessments.      No Hypotension, chest pain, shortness of breath, cramping, nausea or vomiting.     Target UF: 2 L as tolerated,keep MAP >65.  De cannulated earlier this week, oxygenating well on RA.  Continue EPO  Continue binders   No lab stick/BP intake on access site  Continue to monitor intake and output, daily weights   Please avoid gadolinium, fleets, phos-based laxatives, NSAIDs  Will follow closely and continue dialysis treatments while in-patient    SHERLY Gregory DNP, APRN, FNP-C  Department of Nephrology  [facility]  Pager: [phone]

## 2024-05-03 NOTE — PT/OT/SLP PROGRESS
Occupational Therapy  Co -  Treatment with PT    Co-evaluation/treatment performed due to patient's multiple deficits requiring two skilled therapists to appropriately and safely assess patient's strength and endurance while facilitating functional tasks in addition to accommodating for patient's activity tolerance.       Name: Sarah Saravia  MRN: [id number]  Admitting Diagnosis:  Acute cystitis with hematuria       Recommendations:     Discharge Recommendations: Moderate Intensity Therapy  Discharge Equipment Recommendations:  to be determined by next level of care  Barriers to discharge:   requires increased assistance    Assessment:     Sarah Saravia is a 53 y.o. female with a medical diagnosis of Acute cystitis with hematuria.  She presents with performance deficits affecting function are weakness, impaired self care skills, impaired balance, decreased ROM, decreased safety awareness, impaired cognition, gait instability, decreased upper extremity function, decreased lower extremity function, impaired functional mobility, impaired endurance.     Pt participated well in therapy this date, daughter present.  Pt still presents with limited responsivity to questions, and is able to follow ~20% of commands this date.  Participation may have been impacted by fatigue following dialysis. With sup>sit>sup transitions, pt presents with anxiety and is soothed with verbal cues. Pt able to complete bed mobility with total A of 2 persons.  Pt sat EOB ~20min with total A to SBA with periods of CGA with occasional verbal cues for posterior/left lean.  While OT supported pt's trunk, PT engaged pt with self care and neuromuscular education, with daughter supporting.  Pt able to reach for daughter's target hand 3/5 trials, and for fidget tool 2/5 trials this date.  Pt intrinsically motivated, and was able to reach and untie part of PT's gown, when presented with opportunity, pt unable or unwilling to retie gown.  All in all, pt  engages in therapy when she is able to and is on pathway for post acute care at moderate level of intensity.     Rehab Prognosis:  Good; patient would benefit from acute skilled OT services to address these deficits and reach maximum level of function.       Plan:     Patient to be seen 3 x/week to address the above listed problems via self-care/home management, therapeutic activities, therapeutic exercises, neuromuscular re-education  Plan of Care Expires: 05/22/24  Plan of Care Reviewed with: patient    Subjective     Chief Complaint: None  Patient/Family Comments/goals: Get better  Pain/Comfort:  Pain Rating 1:  grimaced 1x with LLE movement  Location - Side 1: Left  Location - Orientation 1: generalized  Location 1: leg  Pain Addressed 1: Reposition, Distraction  Pain Rating Post-Intervention 1: 0/10    Objective:     Communicated with: RN prior to session.  Patient found supine with PEG Tube, pressure relief boots upon OT entry to room.    General Precautions: Standard, aphasia, aspiration, fall, NPO    Orthopedic Precautions:N/A  Braces: N/A  Respiratory Status: Room air     Occupational Performance:     Bed Mobility:    Patient completed Rolling/Turning to Left with  total assistance and 2 persons  Patient completed Rolling/Turning to Right with total assistance and 2 persons  Patient completed Scooting/Bridging  To EOB with total assistance of 2 persons  To HOB with total A of 2 persons  Patient completed Supine to Sit with total assistance and 2 persons  Patient completed Sit to Supine with total assistance and 2 persons   Pt able to sit EOB with total A to SBA with periods of CGA for posterior/left lean ~20min, requiring multisensory cues     Functional Mobility/Transfers:  Not completed this date    Activities of Daily Living:  Grooming: maximal assistance warm washcloth presented, pt picked it up from PT's hand but did not bring to face despite cues, placed on bed. PT assisted with facial hygiene while  OT provided trunk control    Upper Body Dressing: total assistance affixing gown at neck and back to maximize coverage    Neuromuscular Re-Education:    - Pt able to complete 30% bilateral reaching activities to targeted area (daughter's hand and preferred fidget tool).       [facility] 6 Click ADL: 6    Treatment & Education:  Pt educated on role of OT, POC, and goals for therapy.    POC was dicussed with patient/caregiver, who was included in its development and is in agreement with the identified goals and treatment plan.   Patient and family aware of patient's deficits and therapy progression.   Time provided for therapeutic counseling and discussion of health disposition.   Educated on importance of EOB/OOB mobility, maintaining routine, sitting up in chair, and maximizing independence with ADLs during admission   Pt completed ADLs and functional mobility for treatment session as noted above   Pt/caregiver verbalized understanding and expressed no further concerns/questions.  Updated communication board with level of assist required       Patient left supine with all lines intact, call button in reach, and PCT and daughter present    GOALS:   Multidisciplinary Problems       Occupational Therapy Goals          Problem: Occupational Therapy    Goal Priority Disciplines Outcome Interventions   Occupational Therapy Goal     OT, PT/OT Progressing    Description: Goals to be met by: 5/22/24     Patient will increase functional independence with ADLs by performing:    UE Dressing with Maximum Assistance.  Grooming while EOB with Maximum Assistance.  Sitting at edge of bed x5 minutes with Maximum Assistance.  Rolling to Bilateral with Moderate Assistance.   Supine to sit with Maximum Assistance.                         Time Tracking:     OT Date of Treatment: 05/03/24  OT Start Time: 1501  OT Stop Time: 1535  OT Total Time (min): 34 min    Billable Minutes:Therapeutic Activity 34    OT/JOSÉ: OT     Number of JOSÉ visits  since last OT visit: 1    5/3/2024

## 2024-05-03 NOTE — PLAN OF CARE
Problem: Physical Therapy  Goal: Physical Therapy Goal  Description: Goals to be met by: 24   Goals remain appropriate 5/3/2024 to be met by 24    Patient will increase functional independence with mobility by performin. Supine to sit with Maximum Assistance  2. Sit to supine with Maximum Assistance  3. Rolling to Left and Right with Moderate Assistance.  4. Sitting at edge of bed 5 minutes with Moderate Assistance- MET , monitor consistency  5. Lower extremity exercise program x20 reps per handout, with assistance as needed    Outcome: Progressing

## 2024-05-03 NOTE — PLAN OF CARE
[facility] - Telemetry Stepdown  Discharge Reassessment    Primary Care Provider: No, Primary Doctor    Expected Discharge Date: 5/10/2024    Reassessment (most recent)       Discharge Reassessment - 05/03/24 1415          Discharge Reassessment    Assessment Type Discharge Planning Reassessment     Did the patient's condition or plan change since previous assessment? No     Discharge Plan discussed with: Adult children     Communicated ZEKE with patient/caregiver Yes     Discharge Plan A Skilled Nursing Facility     Discharge Plan B Home with family     DME Needed Upon Discharge  other (see comments)   TBD    Why the patient remains in the hospital Placement issues        Post-Acute Status    Post-Acute Authorization Placement     Post-Acute Placement Status Referrals Sent                     Discharge Plan A and Plan B have been determined by review of patient's clinical status, future medical and therapeutic needs, and coverage/benefits for post-acute care in coordination with multidisciplinary team members.     Sara Loredo RN  Ext [phone]

## 2024-05-03 NOTE — ASSESSMENT & PLAN NOTE
Creatine stable for now. BMP reviewed- noted Estimated Creatinine Clearance: 17 mL/min (A) (based on SCr of 5.2 mg/dL (H)). according to latest data. Based on current GFR, CKD stage is end stage.  Monitor UOP and serial BMP and adjust therapy as needed. Renally dose meds. Avoid nephrotoxic medications and procedures.    -- HD MWF  -- nephro following  -- sevelamer TID

## 2024-05-03 NOTE — ASSESSMENT & PLAN NOTE
Patient's FSGs are controlled on current medication regimen.  Last A1c reviewed-   Lab Results   Component Value Date    HGBA1C 10.4 (H) 01/30/2024     Most recent fingerstick glucose reviewed-   Recent Labs   Lab 05/02/24  1831 05/02/24  2203 05/03/24  0105 05/03/24  0615   POCTGLUCOSE 136* 146* 154* 153*       Current correctional scale  Medium  Maintain anti-hyperglycemic dose as follows-   Antihyperglycemics (From admission, onward)    Start     Stop Route Frequency Ordered    04/17/24 1200  insulin aspart U-100 pen 4 Units         -- SubQ Every 6 hours 04/17/24 0938    04/17/24 0944  insulin aspart U-100 pen 0-10 Units         -- SubQ Every 6 hours PRN 04/17/24 0938    04/13/24 2100  insulin detemir U-100 (Levemir) pen 27 Units         -- SubQ 2 times daily 04/13/24 0931        Hold Oral hypoglycemics while patient is in the hospital.  Aspart 3u q4h  Levemir 27u BID  Anticipate discharge with above regimen given well controlled blood glucose while at goal TF rate  MDSSI  POCT glucose q4h, please give remaining sliding scale requirement if above the scheduled 3u  Ordered current regimen with elevation in glucose

## 2024-05-03 NOTE — PT/OT/SLP PROGRESS
Physical Therapy Treatment    Patient Name:  Sarah Saravia   MRN:  [id number]    Recommendations:     Discharge Recommendations: Moderate Intensity Therapy  Discharge Equipment Recommendations: to be determined by next level of care  Barriers to discharge:  increased level of skilled assist    Assessment:     Sarah Saravia is a 53 y.o. female admitted with a medical diagnosis of Acute cystitis with hematuria.  She presents with the following impairments/functional limitations: weakness, impaired endurance, impaired functional mobility, gait instability, decreased lower extremity function, decreased upper extremity function, impaired balance, decreased safety awareness, pain, decreased ROM, impaired self care skills, decreased coordination Patient and daughter agreeable to therapy, patient asleep upon initial entry into room but arousable with verbal/tactile stimuli. Unable to produce any verbalizations this date, grimaces with LLE movement. Requires skilled assist of 2 therapists to sit at EOB, future sessions to emphasize drawsheet transfer to medi-chair. Patient will continue to benefit from skilled PT during this admit to address BLE strength and endurance deficits, and maximize independence with functional mobility.    Rehab Prognosis: Good; patient would benefit from acute skilled PT services to address these deficits and reach maximum level of function.    Recent Surgery: * No surgery found *      Plan:     During this hospitalization, patient to be seen 3 x/week to address the identified rehab impairments via gait training, therapeutic activities, therapeutic exercises, neuromuscular re-education and progress toward the following goals:    Plan of Care Expires:  05/22/24    Subjective     Chief Complaint: grimaces with LLE movement, fatigue following HD  Patient/Family Comments/goals: gain independence with sitting EOB - per daughter  Pain/Comfort:  Pain Rating 1:  (no pain rating stated)  Location - Side  1: Left  Location - Orientation 1: generalized  Location 1: leg  Pain Addressed 1: Reposition, Distraction  Pain Rating Post-Intervention 1:  (no pain rating stated, grimaces with movement)      Objective:     Communicated with RN prior to session.  Patient found HOB elevated with PEG Tube, pressure relief boots upon PT entry to room.     General Precautions: Standard, aphasia, aspiration, fall, NPO, contact  Orthopedic Precautions: N/A  Braces: N/A  Respiratory Status: Room air     Functional Mobility:  Bed Mobility:     Rolling Left:  total assistance and of 2 persons  Rolling Right: total assistance and of 2 persons  Scooting: total assistance and of 2 persons  Supine to Sit: total assistance and of 2 persons  Sit to Supine: total assistance and of 2 persons  Balance: Sitting: at EOB: fluctuates between total A and progressing to CGA with significant posterolateral lean. Inconsistently able to utilize core musculature to correct posture with verbal/tactile cues      AM-PAC 6 CLICK MOBILITY  Turning over in bed (including adjusting bedclothes, sheets and blankets)?: 2  Sitting down on and standing up from a chair with arms (e.g., wheelchair, bedside commode, etc.): 1  Moving from lying on back to sitting on the side of the bed?: 2  Moving to and from a bed to a chair (including a wheelchair)?: 1  Need to walk in hospital room?: 1  Climbing 3-5 steps with a railing?: 1  Basic Mobility Total Score: 8       Treatment & Education:  Patient educated on calling for assistance for any needs to improve overall safety awareness.  Patient educated on current level of function and progression towards therapeutic goals.  Midline position promoted during seated trial, reaching implemented for daughter's hand and spiked rubber toys for meaningful motions. Inconsistent command following, promotion of engagement throughout session    Patient left HOB elevated with all lines intact, call button in reach, and daughter, PCT  present..    GOALS:   Multidisciplinary Problems       Physical Therapy Goals          Problem: Physical Therapy    Goal Priority Disciplines Outcome Goal Variances Interventions   Physical Therapy Goal     PT, PT/OT Progressing     Description: Goals to be met by: 24   Goals remain appropriate 5/3/2024 to be met by 24    Patient will increase functional independence with mobility by performin. Supine to sit with Maximum Assistance  2. Sit to supine with Maximum Assistance  3. Rolling to Left and Right with Moderate Assistance.  4. Sitting at edge of bed 5 minutes with Moderate Assistance- MET , monitor consistency  5. Lower extremity exercise program x20 reps per handout, with assistance as needed                         Time Tracking:     PT Received On: 24  PT Start Time: 1501     PT Stop Time: 1535  PT Total Time (min): 34 min     Billable Minutes: Neuromuscular Re-education 34       PT/PTA: PT     Number of PTA visits since last PT visit: 0     2024

## 2024-05-03 NOTE — PLAN OF CARE
Pt participated well in therapy this date.     Problem: Occupational Therapy  Goal: Occupational Therapy Goal  Description: Goals to be met by: 5/22/24     Patient will increase functional independence with ADLs by performing:    UE Dressing with Maximum Assistance.  Grooming while EOB with Maximum Assistance.  Sitting at edge of bed x5 minutes with Maximum Assistance.  Rolling to Bilateral with Moderate Assistance.   Supine to sit with Maximum Assistance.    Outcome: Progressing

## 2024-05-03 NOTE — PROGRESS NOTES
05/03/24 1116        Hemodialysis Catheter 04/30/24 1708 right internal jugular   Placement Date/Time: 04/30/24 1708   Present Prior to Hospital Arrival?: Yes  Hand Hygiene: Performed  Barrier Precautions: Performed  Skin Antisepsis: ChloraPrep  Hemodialysis Catheter Type: Tunneled catheter  Location: right internal jugular  Cathet...   Site Assessment No drainage   Line Securement Device Secured with sutureless device   Dressing Type CHG impregnated dressing/sponge   Dressing Status Clean;Dry;Intact   Dressing Intervention Sterile dressing change   Date on Dressing 05/03/24   Dressing Due to be Changed 05/10/24   Venous Patency/Care deaccessed;flushed w/o difficulty;heparin locked   Arterial Patency/Care deaccessed;flushed w/o difficulty;heparin locked   During Hemodialysis Assessment   Blood Flow Rate (mL/min) 350 mL/min   Dialysate Flow Rate (mL/min) 700 ml/min   Ultrafiltration Rate (mL/Hr) 760 mL/Hr   Arteriovenous Lines Secure Yes   Arterial Pressure (mmHg) -210 mmHg   Venous Pressure (mmHg) 90   UF Removed (mL) 2650 mL   TMP 40   Venous Line in Air Detector Yes   Transducer Dry Yes   Access Visible Yes   Intra-Hemodialysis Comments HD completed   Post-Hemodialysis Assessment   Rinseback Volume (mL) 250 mL   Blood Volume Processed (Liters) 66.2 L   Dialyzer Clearance Moderately streaked   Duration of Treatment 210 minutes   Total UF (mL) 2650 mL   Net Fluid Removal 2000   Patient Response to Treatment Tolerated well     Patient left ADAMS by bed NAD.

## 2024-05-03 NOTE — PROGRESS NOTES
05/03/24 0743        Hemodialysis Catheter 04/30/24 1708 right internal jugular   Placement Date/Time: 04/30/24 1708   Present Prior to Hospital Arrival?: Yes  Hand Hygiene: Performed  Barrier Precautions: Performed  Skin Antisepsis: ChloraPrep  Hemodialysis Catheter Type: Tunneled catheter  Location: right internal jugular  Cathet...   Site Assessment No drainage   Line Securement Device Secured with sutureless device   Dressing Type CHG impregnated dressing/sponge   Dressing Status Clean;Dry;Intact   Dressing Intervention Integrity maintained   Venous Patency/Care accessed;blood return present;flushed w/o difficulty   Arterial Patency/Care accessed;blood return present;flushed w/o difficulty   During Hemodialysis Assessment   Blood Flow Rate (mL/min) 350 mL/min   Dialysate Flow Rate (mL/min) 700 ml/min   Ultrafiltration Rate (mL/Hr) 760 mL/Hr   Arteriovenous Lines Secure Yes   Arterial Pressure (mmHg) -210 mmHg   Venous Pressure (mmHg) 90   Blood Volume Processed (Liters) 0 L   UF Removed (mL) 0 mL   TMP 40   Venous Line in Air Detector Yes   Intake (mL) 250 mL   Transducer Dry Yes   Access Visible Yes    notified of access issue? N/A   Heparin given? N/A   Intra-Hemodialysis Comments HD started     Patient arrived ADAMS by bed. Isolation HD started via right subclavian CVC.

## 2024-05-03 NOTE — PT/OT/SLP EVAL
Speech Language Pathology Evaluation  Cognitive Communication + Swallowing Treatment + Education    Patient Name:  Sarah Saravia   MRN:  [id number]  Admitting Diagnosis: Acute cystitis with hematuria    Recommendations:     Recommendations:                General Recommendations:  Dysphagia therapy and Speech/language therapy  Diet recommendations:  NPO, NPO   Aspiration Precautions: Continue alternate means of nutrition, Frequent oral care, and Strict aspiration precautions   General Precautions: Standard, aphasia, aspiration, fall, NPO  Communication strategies:  yes/no questions only and go to room if call light pushed    Assessment:     Sarah Saravia is a 53 y.o. female with an SLP diagnosis of Aphasia, Dysphagia, Apraxia, and Cognitive-Linguistic Impairment.      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    Hypermagnesemia 04/11/2024    POA, Mg 3.2  Daily chem       Morbid obesity     Necrotizing fasciitis        Past Surgical History:   Procedure Laterality Date    CLOSURE OF WOUND Right 2/22/2024    Procedure: CLOSURE, WOUND;  Surgeon: Steve Cole MD;  Location: [address];  Service: General;  Laterality: Right;    ESOPHAGOGASTRODUODENOSCOPY W/ PEG N/A 2/22/2024    Procedure: EGD, WITH PEG TUBE INSERTION;  Surgeon: Steve Cole MD;  Location: [address];  Service: General;  Laterality: N/A;    INCISION AND DRAINAGE OF PERIRECTAL REGION N/A 1/29/2024    Procedure: INCISION AND DRAINAGE, PERIRECTAL REGION;  Surgeon: Axel Ramsay MD;  Location: [facility];  Service: General;  Laterality: N/A;    LUMBAR PUNCTURE N/A 2/16/2024    Procedure: Lumbar Puncture;  Surgeon: Macy Boykin;  [facility];  Service: Anesthesiology;  Laterality: N/A;    PLACEMENT, TRIALYSIS CATH Right 1/31/2024    Procedure: INSERTION, CATHETER, TRIPLE LUMEN, HEMODIALYSIS, TEMPORARY;  Surgeon: Alvin Junior MD;  Location: [facility] OR;   Service: General;  Laterality: Right;    REPLACEMENT OF WOUND VACUUM-ASSISTED CLOSURE DEVICE Right 2/12/2024    Procedure: REPLACEMENT, WOUND VAC;  Surgeon: Mundo Carmona MD;  Location: [facility];  Service: General;  Laterality: Right;    REPLACEMENT OF WOUND VACUUM-ASSISTED CLOSURE DEVICE N/A 2/15/2024    Procedure: REPLACEMENT, WOUND VAC;  Surgeon: Steve Cole MD;  [facility];  Service: General;  Laterality: N/A;    REPLACEMENT OF WOUND VACUUM-ASSISTED CLOSURE DEVICE Right 2/19/2024    Procedure: REPLACEMENT, WOUND VAC;  Surgeon: Setve Cole MD;  [facility];  Service: General;  Laterality: Right;  RLE/groin    REPLACEMENT OF WOUND VACUUM-ASSISTED CLOSURE DEVICE Right 2/22/2024    Procedure: REPLACEMENT, WOUND VAC;  Surgeon: Steve Cole MD;  [facility];  Service: General;  Laterality: Right;    TRACHEOSTOMY N/A 2/22/2024    Procedure: CREATION, TRACHEOSTOMY;  Surgeon: Steve Cole MD;  [facility];  Service: General;  Laterality: N/A;    WOUND DEBRIDEMENT Bilateral 2/2/2024    Procedure: DEBRIDEMENT, WOUND;  Surgeon: Steve Cole MD;  [facility];  Service: General;  Laterality: Bilateral;  Bilateral groin  Possible wound vac placement    WOUND DEBRIDEMENT Right 2/6/2024    Procedure: DEBRIDEMENT, WOUND, replace wound vac, possible closure;  Surgeon: Steve Cole MD;  [facility];  Service: General;  Laterality: Right;  RLE    WOUND DEBRIDEMENT Right 2/9/2024    Procedure: DEBRIDEMENT, WOUND w wound vac change;  Surgeon: Steve Cole MD;  [facility];  Service: General;  Laterality: Right;  RLE    WOUND DEBRIDEMENT Right 2/12/2024    Procedure: R thigh wound debridement;  Surgeon: Mundo Carmona MD;  [facility];  Service: General;  Laterality: Right;    WOUND EXPLORATION Right 1/31/2024    Procedure: IRRIGATION & DEBRIDEMENT, WOUND DEBRIDEMENT;  Surgeon: Alvin Junior  "MD BABATUNDE;  Location: [facility];  Service: General;  Laterality: Right;       Subjective     "Yeah" pt spontaneously answered a spontaneous yes/no q's, but did not answered structured/therapeutic yes/no q's.     Pain/Comfort:  Pain Rating 1:  (no indications of pain)    Respiratory Status: Room air    Objective:     Cognitive Status:    Unable to assess      Receptive Language:   Comprehension:      Pt did not answer any formal simple yes/no q's, but occasionally nodded, vocalized "mm, hmm," or stated "yeah" in response to informal yes/no q's.  Pt did not follow any formal commands, but did respond to open mouth to allow SLP to visualize oral cavity after ice chip given verbal, visual, and tactile cues.      Pragmatics:    Abnormal affect flat    Expressive Language:  Verbal:    Pt did not phonate on command or given model, made not attempts to sing or hum Happy Birthday song, made no attempts to count from 1-5, made no attempts to repeat simple words, and did not attempt to name objects despite max cues.   Nonverbal:   Gestures only nodding head occasionally      Motor Speech:  Apraxia suspected secondary to minimal verbal attempts    Voice:   WFL though minimal output    Visual-Spatial:  Not yet attempted to assess    Reading:   Not yet attempted to assess      Written Expression:   Not yet attempted to assess    Treatment: Pt participate in ongoing swallowing assessment at bedside.  Pt accepted an ice chip x 1, thin water via 1/2 tsp x 1 and full tsp x 4.  Pt with prolonged oral holding and increased delay in initiation of pharyngeal swallow with subsequent trials.  Overt coughing/choking present for 4th and final tsp sip of water.  Increased time needed to fully recover from coughing.  Oral suctioning provided. No further PO trials given. Education provided to pt regarding ongoing swallowing assessment, recommendations to remain NPO outside of therapeutic PO trials with SLP ONLY, speech/language evaluation, and SLP " "treatment plan and POC.  Pt nodding or vocalizing "mm, hmm" occasionally in response, but full understanding likely limited due to aphasia.     Goals:   Multidisciplinary Problems       SLP Goals          Problem: SLP    Goal Priority Disciplines Outcome   SLP Goal     SLP    Description: Speech Language Pathology Goals  Updated goals expected to be met by 5/10:  1. Pt will participate in ongoing swallowing assessment to determine if appropriate for PO trials for pleasure.   2. Pt will model single step command x 1 given max cues.   3. Pt will answer simple yes/no q's during a structured receptive language task x 1 given max cues.   4. Pt will phonate purposefully upon command x 1 given max cues.   5. Pt will attempt to vocalize/verbalize during automatic speech task x 1 given max cues.   6. Pt will participate in evaluation of ability to utilize basic communication board.     Goals expected to be met by 5/8:  1. Pt will participate in Modified Barium Swallow Study to determine if safe for oral intake. Goal met/attempted 5/2                               Plan:   Patient to be seen:  3 x/week   Plan of Care expires:  05/31/24  Plan of Care reviewed with:  patient   SLP Follow-Up:  Yes       Discharge recommendations:  Therapy Intensity Recommendations at Discharge: Moderate Intensity Therapy     Time Tracking:     SLP Treatment Date:   05/03/24  Speech Start Time:  1234  Speech Stop Time:  1256     Speech Total Time (min):  22 min    Billable Minutes:  Eval 8 , Treatment Swallowing Dysfunction 6, and Self Care/Home Management Training 8    05/03/2024       "

## 2024-05-03 NOTE — PROGRESS NOTES
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 History was given from patient's daughter. She was undergoing dialysis today and noticed she was lethargic, less alert than usual self. Pt completed dialysis and still not acting herself. EMS was called, fever of a 100.  On chart review, she did have an episode of large volume emesis around 1700.  Per EMS, she had a slightly elevated temp 100.0°F, glucose 300s.     In the ED: UA 2+ leuks, >100 WBC, many bacteria, WBC 17, CT abd/pelvis concerning for cystitis. Given vanc/zosyn    Overview/Hospital Course:  Patient was admitted to Hospital Medicine service for medical management and evaluation of urosepsis. Patient was continued on vanc/zosyn. Vascular Neurology consulted concerning mental status change with the recommendation to initiate ASA 81 QD and to obtain an MRI to r/o new stroke. Imaging pending. Nephrology was consulted for regularly scheduled dialysis. Afternoon of 4/12, patient was unable to tolerate filtration of volume during dialsis 2/2 hypotension. Patient received 500cc bolus and was transferred back to floor after finishing the session without volume removed. Will monitor for signs of volume overload. Tailoring insulin regimen while uptitrating tube feeds to goal rate. Staph Epi in all 4 bottles; ID with recommendation to continue Vanc & de-escalate meropenem to ertapenem on 04/15 through 4/16. Patient completed IV course of UTI coverage. Patient tolerated volume removal well in dialysis  throughout the rest of her hospital stay. Pending discharge to LTACH pending bed availability. Patient without BM with one episode of vomiting overnight 4/17. KUB with bowel gas and low concern for obstruction with no transition point noted. Escalating bowel regimen. Pending placement as of 4/22. Pulm consult placed to evaluate for possible trach downsize & eventual decannulation to assist placement if safe. Trach capping trial per pulm for 48h and will assess for decannulation on Monday. DVT studies negative. Pulm successfully decannulated pt 4/30, IR exchanged TDC. Pending swallow study with SLP and waiting for dispo options. Pt failed swallow study, placement pending. Working with PT on mobilize pt to sitting in a chair to expand placement options    Interval History: NAEON. Pending placement      Objective:     Vital Signs (Most Recent):  Temp: 98 °F (36.7 °C) (05/03/24 0736)  Pulse: 105 (05/03/24 1100)  Resp: 18 (05/03/24 0736)  BP: 120/69 (05/03/24 1100)  SpO2: 100 % (05/03/24 0736) Vital Signs (24h Range):  Temp:  [96.5 °F (35.8 °C)-100 °F (37.8 °C)] 98 °F (36.7 °C)  Pulse:  [] 105  Resp:  [16-19] 18  SpO2:  [94 %-100 %] 100 %  BP: (104-139)/(56-89) 120/69     Weight: 122.5 kg (270 lb 1 oz)  Body mass index is 42.3 kg/m².    Intake/Output Summary (Last 24 hours) at 5/3/2024 1128  Last data filed at 5/3/2024 0624  Gross per 24 hour   Intake 1678 ml   Output --   Net 1678 ml         Physical Exam  Vitals and nursing note reviewed.   HENT:      Head: Normocephalic and atraumatic.   Neck:      Comments: Trach removed  Cardiovascular:      Rate and Rhythm: Normal rate and regular rhythm.      Heart sounds: Normal heart sounds.   Pulmonary:      Effort: Pulmonary effort is normal. No respiratory distress.      Breath sounds: Normal breath sounds.   Abdominal:      General: Abdomen is flat. There is no distension.      Palpations: Abdomen is soft.      Tenderness: There is no abdominal tenderness.       Comments: PEG tube   Musculoskeletal:      Right lower leg: No edema.      Left lower leg: No edema.      Comments: Moves UEs spontaneously   Skin:     General: Skin is warm and dry.      Findings: Wound present.   Neurological:      General: No focal deficit present.      Mental Status: She is alert.      Comments: Nonverbal, not following commands             Significant Labs: All pertinent labs within the past 24 hours have been reviewed.    Significant Imaging: I have reviewed all pertinent imaging results/findings within the past 24 hours.    Assessment/Plan:      * Acute cystitis with hematuria  resolved    Pt presenting with AMS and worsening lethargy. Pt is non-verbal at baseline, does not follow commands, but was less responsive than normal. Pt found to have a fever. Urinary source suspected based off of urine and CT abd/pelvis. Pt has many prior resistant bacterial infections including urinary sources. Has prior with sensitivity to zosyn and has also improved off ED dose of vanc/zosyn. Lactic elevated a 3.8->3.49 prior to completion of fluid bolus 500ml.    Patient with new fever and tachycardia on 4/25. Low threshold to initiate additional infectious workup and repeat UA.     Plan  S/p course of vanc/ertapenem per ID. Course completed 4/16.  Daily cbc  Contact precautions    *on contact precautions indefinitely while patient still makes urine given pt incontinent per infection control    Complication of vascular dialysis catheter  Cath intermittently clogging, not resolving with cath-hedy, going for IR venogram 4/30 with possible exchange. S/p exchange with IR on 4/30      Constipation  RESOLVED with Bowel regimen  Patient without BM x5d. One episode of vomitus night of 4/17. Some concern for bowel obstruction. Tolerating continuous tube feeds at goal rate with 0cc on residuals. Physical exam with bowel sounds, soft nontender abdomen. KUB without concern for obstruction with bowel gas, lack of transition  point.    Patient now with regular bowel movements on bowel regimen.     Plan  - Miralax BID, Senna BID  - One time mag citrate per PEG ordered  - compazine PRN    Moderate malnutrition  Nutrition consulted. Most recent weight and BMI monitored-     Measurements:  Wt Readings from Last 1 Encounters:   05/01/24 122.5 kg (270 lb 1 oz)   Body mass index is 42.3 kg/m².    Patient has been screened and assessed by RD.    Malnutrition Type:  Context: acute illness or injury  Level: moderate    Malnutrition Characteristic Summary:  Weight Loss (Malnutrition): 10% in 6 months  Subcutaneous Fat (Malnutrition): mild depletion  Muscle Mass (Malnutrition): mild depletion  Fluid Accumulation (Malnutrition): mild    Interventions/Recommendations (treatment strategy):  1.    -- TF  -- going for swallow study with SLP to assess possibility of pleasure oral feedings    Prolonged QT interval  Qtc 526, limit Qtc prolonging drugs as able      Spastic hemiplegia of right dominant side as late effect of cerebrovascular disease   This patient has Chronic right hemiplegia due to stroke. Physical therapy services has not been scheduled. Continue all standard measures for pressure injury prevention and consult wound care for any wounds (chronic or acute).    ESRD (end stage renal disease) on dialysis  Creatine stable for now. BMP reviewed- noted Estimated Creatinine Clearance: 17 mL/min (A) (based on SCr of 5.2 mg/dL (H)). according to latest data. Based on current GFR, CKD stage is end stage.  Monitor UOP and serial BMP and adjust therapy as needed. Renally dose meds. Avoid nephrotoxic medications and procedures.    -- HD MWF  -- nephro following  -- sevelamer TID    Status post tracheostomy  Resolved, decannulated 4/30    Acute encephalopathy  Pt is non-verbal and does not follow commands at baseline. Vascular Neurology consulted given acute change from baseline. MRI with concern for evolution of prior strokes with noted differential of T2  hyperintensities including vasculitis vs demyelination. Discussed with Vascular Neurology; low concern for ongoing vasculitis/demyelination, likely represents typical evolution of prior infarcts.    Patient at baseline as of 4/22.     Plan  - STAT head imaging for concern of new change in mental status/focal deficit    Type 2 diabetes mellitus with hyperglycemia, with long-term current use of insulin  Patient's FSGs are controlled on current medication regimen.  Last A1c reviewed-   Lab Results   Component Value Date    HGBA1C 10.4 (H) 01/30/2024     Most recent fingerstick glucose reviewed-   Recent Labs   Lab 05/02/24  1831 05/02/24  2203 05/03/24  0105 05/03/24  0615   POCTGLUCOSE 136* 146* 154* 153*       Current correctional scale  Medium  Maintain anti-hyperglycemic dose as follows-   Antihyperglycemics (From admission, onward)      Start     Stop Route Frequency Ordered    04/17/24 1200  insulin aspart U-100 pen 4 Units         -- SubQ Every 6 hours 04/17/24 0938    04/17/24 0944  insulin aspart U-100 pen 0-10 Units         -- SubQ Every 6 hours PRN 04/17/24 0938    04/13/24 2100  insulin detemir U-100 (Levemir) pen 27 Units         -- SubQ 2 times daily 04/13/24 0931          Hold Oral hypoglycemics while patient is in the hospital.  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 Pt underwent extensive course, currently still healing from this, but no longer has wound vac. Pt's wound currently covered with bandage. Picture in media tabs    Plan  Wound care        VTE Risk Mitigation (From admission, onward)           Ordered     heparin (porcine) injection 3,000 Units  As needed  (PRN)         04/17/24 0825     heparin (porcine) injection 1,000 Units  As needed (PRN)         04/12/24 0951     heparin (porcine) injection 7,500 Units  Every 8 hours         04/11/24 0252                    Discharge Planning   ZEKE: 5/10/2024     Code Status: Full Code   Is the patient medically ready for discharge?: No    Reason for patient still in hospital (select all that apply): Patient trending condition  Discharge Plan A: Skilled Nursing Facility                  Osito Dos Santos MD  Department of Hospital Medicine   [facility] [address] [facility] - Telemetry Stepdown

## 2024-05-03 NOTE — PLAN OF CARE
Problem: Adjustment to Illness (Chronic Kidney Disease)  Goal: Electrolyte Balance  Outcome: Progressing     Problem: Adjustment to Illness (Chronic Kidney Disease)  Goal: Fluid Balance  Outcome: Progressing

## 2024-05-03 NOTE — NURSING
Nurses Note -- 4 Eyes      5/3/2024   7:00 AM      Skin assessed during: Q Shift Change      [] No Altered Skin Integrity Present    []Prevention Measures Documented      [x] Yes- Altered Skin Integrity Present or Discovered   [] LDA Added if Not in Epic (Describe Wound)   [] New Altered Skin Integrity was Present on Admit and Documented in LDA   [x] Wound Image Taken    Wound Care Consulted? Yes    Attending Nurse:  ELISABETH Martin    Second RN/Staff Member:  ELISABETH Hampton

## 2024-05-03 NOTE — ASSESSMENT & PLAN NOTE
resolved    Pt presenting with AMS and worsening lethargy. Pt is non-verbal at baseline, does not follow commands, but was less responsive than normal. Pt found to have a fever. Urinary source suspected based off of urine and CT abd/pelvis. Pt has many prior resistant bacterial infections including urinary sources. Has prior with sensitivity to zosyn and has also improved off ED dose of vanc/zosyn. Lactic elevated a 3.8->3.49 prior to completion of fluid bolus 500ml.    Patient with new fever and tachycardia on 4/25. Low threshold to initiate additional infectious workup and repeat UA.     Plan  S/p course of vanc/ertapenem per ID. Course completed 4/16.  Daily cbc  Contact precautions    *on contact precautions indefinitely while patient still makes urine given pt incontinent per infection control

## 2024-05-03 NOTE — PLAN OF CARE
PT alert unable to tell orientation as PT has not spoke this shift. All needs must be anticipated.  All needs met.  She has remained free of falls and injuries. Safety eduction and plan of care reviewed with PT but unable to tell if PT understands. Bed is low and locked with call light with in easy reach. Bed alarm set and tele-sitter at bedside.

## 2024-05-04 LAB
POCT GLUCOSE: 135 MG/DL (ref 70–110)
POCT GLUCOSE: 141 MG/DL (ref 70–110)
POCT GLUCOSE: 153 MG/DL (ref 70–110)
POCT GLUCOSE: 176 MG/DL (ref 70–110)

## 2024-05-04 PROCEDURE — 25000003 PHARM REV CODE 250

## 2024-05-04 PROCEDURE — 27000207 HC ISOLATION

## 2024-05-04 PROCEDURE — 25000242 PHARM REV CODE 250 ALT 637 W/ HCPCS

## 2024-05-04 PROCEDURE — 63600175 PHARM REV CODE 636 W HCPCS

## 2024-05-04 PROCEDURE — 25000003 PHARM REV CODE 250: Performed by: INTERNAL MEDICINE

## 2024-05-04 PROCEDURE — 20600001 HC STEP DOWN PRIVATE ROOM

## 2024-05-04 PROCEDURE — 25000003 PHARM REV CODE 250: Performed by: STUDENT IN AN ORGANIZED HEALTH CARE EDUCATION/TRAINING PROGRAM

## 2024-05-04 PROCEDURE — 94761 N-INVAS EAR/PLS OXIMETRY MLT: CPT

## 2024-05-04 RX ORDER — SODIUM CHLORIDE 9 MG/ML
INJECTION, SOLUTION INTRAVENOUS ONCE
Status: COMPLETED | OUTPATIENT
Start: 2024-05-06 | End: 2024-05-06

## 2024-05-04 RX ADMIN — ATORVASTATIN CALCIUM 40 MG: 40 TABLET, FILM COATED ORAL at 09:05

## 2024-05-04 RX ADMIN — Medication 500 MG: at 09:05

## 2024-05-04 RX ADMIN — PANTOPRAZOLE SODIUM 40 MG: 40 GRANULE, DELAYED RELEASE ORAL at 09:05

## 2024-05-04 RX ADMIN — INSULIN ASPART 4 UNITS: 100 INJECTION, SOLUTION INTRAVENOUS; SUBCUTANEOUS at 12:05

## 2024-05-04 RX ADMIN — INSULIN ASPART 4 UNITS: 100 INJECTION, SOLUTION INTRAVENOUS; SUBCUTANEOUS at 06:05

## 2024-05-04 RX ADMIN — INSULIN DETEMIR 27 UNITS: 100 INJECTION, SOLUTION SUBCUTANEOUS at 09:05

## 2024-05-04 RX ADMIN — HEPARIN SODIUM 7500 UNITS: 5000 INJECTION INTRAVENOUS; SUBCUTANEOUS at 04:05

## 2024-05-04 RX ADMIN — ASPIRIN 81 MG CHEWABLE TABLET 81 MG: 81 TABLET CHEWABLE at 09:05

## 2024-05-04 RX ADMIN — SEVELAMER CARBONATE 0.8 G: 2400 POWDER, FOR SUSPENSION ORAL at 09:05

## 2024-05-04 RX ADMIN — DOCUSATE SODIUM AND SENNOSIDES 1 TABLET: 8.6; 5 TABLET, FILM COATED ORAL at 09:05

## 2024-05-04 RX ADMIN — INSULIN ASPART 4 UNITS: 100 INJECTION, SOLUTION INTRAVENOUS; SUBCUTANEOUS at 05:05

## 2024-05-04 RX ADMIN — SEVELAMER CARBONATE 0.8 G: 2400 POWDER, FOR SUSPENSION ORAL at 04:05

## 2024-05-04 RX ADMIN — HEPARIN SODIUM 7500 UNITS: 5000 INJECTION INTRAVENOUS; SUBCUTANEOUS at 05:05

## 2024-05-04 RX ADMIN — INSULIN ASPART 2 UNITS: 100 INJECTION, SOLUTION INTRAVENOUS; SUBCUTANEOUS at 12:05

## 2024-05-04 RX ADMIN — PSYLLIUM HUSK 1 PACKET: 3.4 POWDER ORAL at 09:05

## 2024-05-04 RX ADMIN — POLYETHYLENE GLYCOL 3350 17 G: 17 POWDER, FOR SOLUTION ORAL at 09:05

## 2024-05-04 RX ADMIN — METOPROLOL TARTRATE 25 MG: 25 TABLET, FILM COATED ORAL at 09:05

## 2024-05-04 RX ADMIN — INSULIN ASPART 1 UNITS: 100 INJECTION, SOLUTION INTRAVENOUS; SUBCUTANEOUS at 05:05

## 2024-05-04 RX ADMIN — ZINC SULFATE 220 MG (50 MG) CAPSULE 220 MG: CAPSULE at 09:05

## 2024-05-04 RX ADMIN — HEPARIN SODIUM 7500 UNITS: 5000 INJECTION INTRAVENOUS; SUBCUTANEOUS at 09:05

## 2024-05-04 NOTE — PROGRESS NOTES
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 History was given from patient's daughter. She was undergoing dialysis today and noticed she was lethargic, less alert than usual self. Pt completed dialysis and still not acting herself. EMS was called, fever of a 100.  On chart review, she did have an episode of large volume emesis around 1700.  Per EMS, she had a slightly elevated temp 100.0°F, glucose 300s.     In the ED: UA 2+ leuks, >100 WBC, many bacteria, WBC 17, CT abd/pelvis concerning for cystitis. Given vanc/zosyn    Overview/Hospital Course:  Patient was admitted to Hospital Medicine service for medical management and evaluation of urosepsis. Patient was continued on vanc/zosyn. Vascular Neurology consulted concerning mental status change with the recommendation to initiate ASA 81 QD and to obtain an MRI to r/o new stroke. Imaging pending. Nephrology was consulted for regularly scheduled dialysis. Afternoon of 4/12, patient was unable to tolerate filtration of volume during dialsis 2/2 hypotension. Patient received 500cc bolus and was transferred back to floor after finishing the session without volume removed. Will monitor for signs of volume overload. Tailoring insulin regimen while uptitrating tube feeds to goal rate. Staph Epi in all 4 bottles; ID with recommendation to continue Vanc & de-escalate meropenem to ertapenem on 04/15 through 4/16. Patient completed IV course of UTI coverage. Patient tolerated volume removal well in dialysis  throughout the rest of her hospital stay. Pending discharge to LTACH pending bed availability. Patient without BM with one episode of vomiting overnight 4/17. KUB with bowel gas and low concern for obstruction with no transition point noted. Escalating bowel regimen. Pending placement as of 4/22. Pulm consult placed to evaluate for possible trach downsize & eventual decannulation to assist placement if safe. Trach capping trial per pulm for 48h and will assess for decannulation on Monday. DVT studies negative. Pulm successfully decannulated pt 4/30, IR exchanged TDC. Pending swallow study with SLP and waiting for dispo options. Pt failed swallow study, placement pending. Working with PT on mobilize pt to sitting in a chair to expand placement options    Interval History: NAEON. Pending placement      Objective:     Vital Signs (Most Recent):  Temp: 99.3 °F (37.4 °C) (05/04/24 0727)  Pulse: 104 (05/04/24 0727)  Resp: 17 (05/04/24 0727)  BP: 122/66 (05/04/24 0727)  SpO2: 100 % (05/04/24 0727) Vital Signs (24h Range):  Temp:  [98 °F (36.7 °C)-99.8 °F (37.7 °C)] 99.3 °F (37.4 °C)  Pulse:  [] 104  Resp:  [17-19] 17  SpO2:  [91 %-100 %] 100 %  BP: (108-145)/(66-91) 122/66     Weight: 122.5 kg (270 lb 1 oz)  Body mass index is 42.3 kg/m².    Intake/Output Summary (Last 24 hours) at 5/4/2024 0800  Last data filed at 5/4/2024 0610  Gross per 24 hour   Intake 1339.65 ml   Output 2650 ml   Net -1310.35 ml         Physical Exam  Vitals and nursing note reviewed.   HENT:      Head: Normocephalic and atraumatic.   Neck:      Comments: Trach removed  Cardiovascular:      Rate and Rhythm: Normal rate and regular rhythm.      Heart sounds: Normal heart sounds.   Pulmonary:      Effort: Pulmonary effort is normal. No respiratory distress.      Breath sounds: Normal breath sounds.   Abdominal:      General: Abdomen is flat. There is no distension.      Palpations: Abdomen is soft.      Tenderness: There is no abdominal  tenderness.      Comments: PEG tube   Musculoskeletal:      Right lower leg: No edema.      Left lower leg: No edema.      Comments: Moves UEs spontaneously   Skin:     General: Skin is warm and dry.      Findings: Wound present.   Neurological:      General: No focal deficit present.      Mental Status: She is alert.      Comments: Nonverbal, not following commands             Significant Labs: All pertinent labs within the past 24 hours have been reviewed.    Significant Imaging: I have reviewed all pertinent imaging results/findings within the past 24 hours.    Assessment/Plan:      * Acute cystitis with hematuria  resolved    Pt presenting with AMS and worsening lethargy. Pt is non-verbal at baseline, does not follow commands, but was less responsive than normal. Pt found to have a fever. Urinary source suspected based off of urine and CT abd/pelvis. Pt has many prior resistant bacterial infections including urinary sources. Has prior with sensitivity to zosyn and has also improved off ED dose of vanc/zosyn. Lactic elevated a 3.8->3.49 prior to completion of fluid bolus 500ml.    Patient with new fever and tachycardia on 4/25. Low threshold to initiate additional infectious workup and repeat UA.     Plan  S/p course of vanc/ertapenem per ID. Course completed 4/16.  Daily cbc  Contact precautions    *on contact precautions indefinitely while patient still makes urine given pt incontinent per infection control    Complication of vascular dialysis catheter  Cath intermittently clogging, not resolving with cath-hedy, going for IR venogram 4/30 with possible exchange. S/p exchange with IR on 4/30      Constipation  RESOLVED with Bowel regimen  Patient without BM x5d. One episode of vomitus night of 4/17. Some concern for bowel obstruction. Tolerating continuous tube feeds at goal rate with 0cc on residuals. Physical exam with bowel sounds, soft nontender abdomen. KUB without concern for obstruction with bowel gas,  lack of transition point.    Patient now with regular bowel movements on bowel regimen.     Plan  - Miralax BID, Senna BID  - One time mag citrate per PEG ordered  - compazine PRN    Moderate malnutrition  Nutrition consulted. Most recent weight and BMI monitored-     Measurements:  Wt Readings from Last 1 Encounters:   05/01/24 122.5 kg (270 lb 1 oz)   Body mass index is 42.3 kg/m².    Patient has been screened and assessed by RD.    Malnutrition Type:  Context: acute illness or injury  Level: moderate    Malnutrition Characteristic Summary:  Weight Loss (Malnutrition): 10% in 6 months  Subcutaneous Fat (Malnutrition): mild depletion  Muscle Mass (Malnutrition): mild depletion  Fluid Accumulation (Malnutrition): mild    Interventions/Recommendations (treatment strategy):  1.    -- TF  -- going for swallow study with SLP to assess possibility of pleasure oral feedings    Prolonged QT interval  Qtc 526, limit Qtc prolonging drugs as able      Spastic hemiplegia of right dominant side as late effect of cerebrovascular disease   This patient has Chronic right hemiplegia due to stroke. Physical therapy services has not been scheduled. Continue all standard measures for pressure injury prevention and consult wound care for any wounds (chronic or acute).    ESRD (end stage renal disease) on dialysis  Creatine stable for now. BMP reviewed- noted Estimated Creatinine Clearance: 17 mL/min (A) (based on SCr of 5.2 mg/dL (H)). according to latest data. Based on current GFR, CKD stage is end stage.  Monitor UOP and serial BMP and adjust therapy as needed. Renally dose meds. Avoid nephrotoxic medications and procedures.    -- HD MWF  -- nephro following  -- sevelamer TID    Status post tracheostomy  Resolved, decannulated 4/30    Acute encephalopathy  Pt is non-verbal and does not follow commands at baseline. Vascular Neurology consulted given acute change from baseline. MRI with concern for evolution of prior strokes with noted  differential of T2 hyperintensities including vasculitis vs demyelination. Discussed with Vascular Neurology; low concern for ongoing vasculitis/demyelination, likely represents typical evolution of prior infarcts.    Patient at baseline as of 4/22.     Plan  - STAT head imaging for concern of new change in mental status/focal deficit    Type 2 diabetes mellitus with hyperglycemia, with long-term current use of insulin  Patient's FSGs are controlled on current medication regimen.  Last A1c reviewed-   Lab Results   Component Value Date    HGBA1C 10.4 (H) 01/30/2024     Most recent fingerstick glucose reviewed-   Recent Labs   Lab 05/03/24  1157 05/03/24  1905 05/03/24  2336 05/04/24  0547   POCTGLUCOSE 168* 150* 135* 153*       Current correctional scale  Medium  Maintain anti-hyperglycemic dose as follows-   Antihyperglycemics (From admission, onward)      Start     Stop Route Frequency Ordered    04/17/24 1200  insulin aspart U-100 pen 4 Units         -- SubQ Every 6 hours 04/17/24 0938    04/17/24 0944  insulin aspart U-100 pen 0-10 Units         -- SubQ Every 6 hours PRN 04/17/24 0938    04/13/24 2100  insulin detemir U-100 (Levemir) pen 27 Units         -- SubQ 2 times daily 04/13/24 0931          Hold Oral hypoglycemics while patient is in the hospital.  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 Pt underwent extensive course, currently still healing from this, but no longer has wound vac. Pt's wound currently covered with bandage. Picture in media tabs    Plan  Wound care        VTE Risk Mitigation (From admission, onward)           Ordered     heparin (porcine) injection 3,000  Units  As needed (PRN)         04/17/24 0825     heparin (porcine) injection 1,000 Units  As needed (PRN)         04/12/24 0951     heparin (porcine) injection 7,500 Units  Every 8 hours         04/11/24 0252                    Discharge Planning   ZEKE: 5/10/2024     Code Status: Full Code   Is the patient medically ready for discharge?: No    Reason for patient still in hospital (select all that apply): Patient trending condition  Discharge Plan A: Skilled Nursing Facility                  Osito Dos Santos MD  Department of Hospital Medicine   [facility] - Telemetry Stepdown

## 2024-05-04 NOTE — NURSING
Nurses Note -- 4 Eyes      5/4/2024   8:03 AM      Skin assessed during: Q Shift Change      [] No Altered Skin Integrity Present    []Prevention Measures Documented      [x] Yes- Altered Skin Integrity Present or Discovered   [] LDA Added if Not in Epic (Describe Wound)   [] New Altered Skin Integrity was Present on Admit and Documented in LDA   [x] Wound Image Taken    Wound Care Consulted? Yes    Attending Nurse:  ELISABETH Martin    Second RN/Staff Member: ELISABETH Mortensen

## 2024-05-04 NOTE — ASSESSMENT & PLAN NOTE
This patient has Chronic right hemiplegia due to stroke. Physical therapy services has not been scheduled. Continue all standard measures for pressure injury prevention and consult wound care for any wounds (chronic or acute).   Detail Level: Detailed Body Location Override (Optional - Billing Will Still Be Based On Selected Body Map Location If Applicable): left superior forehead X Size Of Lesion In Cm (Optional): 0 Name Of The Referring Provider For Procedure: Lala Plummer PA-C Date Scheduled For Mohs (Optional): 04/10/2024 @ 9:00am Incorporate Mauc In Note: Yes

## 2024-05-04 NOTE — SUBJECTIVE & OBJECTIVE
Interval History: NAEON. Pending placement      Objective:     Vital Signs (Most Recent):  Temp: 99.3 °F (37.4 °C) (05/04/24 0727)  Pulse: 104 (05/04/24 0727)  Resp: 17 (05/04/24 0727)  BP: 122/66 (05/04/24 0727)  SpO2: 100 % (05/04/24 0727) Vital Signs (24h Range):  Temp:  [98 °F (36.7 °C)-99.8 °F (37.7 °C)] 99.3 °F (37.4 °C)  Pulse:  [] 104  Resp:  [17-19] 17  SpO2:  [91 %-100 %] 100 %  BP: (108-145)/(66-91) 122/66     Weight: 122.5 kg (270 lb 1 oz)  Body mass index is 42.3 kg/m².    Intake/Output Summary (Last 24 hours) at 5/4/2024 0800  Last data filed at 5/4/2024 0610  Gross per 24 hour   Intake 1339.65 ml   Output 2650 ml   Net -1310.35 ml         Physical Exam  Vitals and nursing note reviewed.   HENT:      Head: Normocephalic and atraumatic.   Neck:      Comments: Trach removed  Cardiovascular:      Rate and Rhythm: Normal rate and regular rhythm.      Heart sounds: Normal heart sounds.   Pulmonary:      Effort: Pulmonary effort is normal. No respiratory distress.      Breath sounds: Normal breath sounds.   Abdominal:      General: Abdomen is flat. There is no distension.      Palpations: Abdomen is soft.      Tenderness: There is no abdominal tenderness.      Comments: PEG tube   Musculoskeletal:      Right lower leg: No edema.      Left lower leg: No edema.      Comments: Moves UEs spontaneously   Skin:     General: Skin is warm and dry.      Findings: Wound present.   Neurological:      General: No focal deficit present.      Mental Status: She is alert.      Comments: Nonverbal, not following commands             Significant Labs: All pertinent labs within the past 24 hours have been reviewed.    Significant Imaging: I have reviewed all pertinent imaging results/findings within the past 24 hours.

## 2024-05-04 NOTE — ASSESSMENT & PLAN NOTE
Patient's FSGs are controlled on current medication regimen.  Last A1c reviewed-   Lab Results   Component Value Date    HGBA1C 10.4 (H) 01/30/2024     Most recent fingerstick glucose reviewed-   Recent Labs   Lab 05/03/24  1157 05/03/24  1905 05/03/24  2336 05/04/24  0547   POCTGLUCOSE 168* 150* 135* 153*       Current correctional scale  Medium  Maintain anti-hyperglycemic dose as follows-   Antihyperglycemics (From admission, onward)    Start     Stop Route Frequency Ordered    04/17/24 1200  insulin aspart U-100 pen 4 Units         -- SubQ Every 6 hours 04/17/24 0938    04/17/24 0944  insulin aspart U-100 pen 0-10 Units         -- SubQ Every 6 hours PRN 04/17/24 0938    04/13/24 2100  insulin detemir U-100 (Levemir) pen 27 Units         -- SubQ 2 times daily 04/13/24 0931        Hold Oral hypoglycemics while patient is in the hospital.  Aspart 3u q4h  Levemir 27u BID  Anticipate discharge with above regimen given well controlled blood glucose while at goal TF rate  MDSSI  POCT glucose q4h, please give remaining sliding scale requirement if above the scheduled 3u  Ordered current regimen with elevation in glucose

## 2024-05-04 NOTE — PLAN OF CARE
Problem: Fall Injury Risk  Goal: Absence of Fall and Fall-Related Injury  Outcome: Progressing  Intervention: Identify and Manage Contributors  Flowsheets (Taken 5/4/2024 0311)  Self-Care Promotion: meal set-up provided  Medication Review/Management: medications reviewed  Intervention: Promote Injury-Free Environment  Flowsheets (Taken 5/4/2024 0311)  Safety Promotion/Fall Prevention:   assistive device/personal item within reach   bed alarm set   Fall Risk reviewed with patient/family   lighting adjusted   medications reviewed   nonskid shoes/socks when out of bed   room near unit station   side rails raised x 3   instructed to call staff for mobility   toileting scheduled     Patient awake, alert , and disoriented.  Pt answers questions when she feels like it. Pain assessed using FACES scale, no evidence of pain.  Pt and significant other educated on safety, and medication use. Mr. Riley verbalized understanding.  Plan of care discussed with patient and Mr. Riley, he verbalized understanding.  Bed alarm on.  Call light within reach. Bed  low and locked.

## 2024-05-05 LAB
POCT GLUCOSE: 147 MG/DL (ref 70–110)
POCT GLUCOSE: 148 MG/DL (ref 70–110)
POCT GLUCOSE: 151 MG/DL (ref 70–110)
POCT GLUCOSE: 157 MG/DL (ref 70–110)
POCT GLUCOSE: 179 MG/DL (ref 70–110)

## 2024-05-05 PROCEDURE — 25000003 PHARM REV CODE 250

## 2024-05-05 PROCEDURE — 25000003 PHARM REV CODE 250: Performed by: STUDENT IN AN ORGANIZED HEALTH CARE EDUCATION/TRAINING PROGRAM

## 2024-05-05 PROCEDURE — 25000003 PHARM REV CODE 250: Performed by: INTERNAL MEDICINE

## 2024-05-05 PROCEDURE — 27000207 HC ISOLATION

## 2024-05-05 PROCEDURE — 25000242 PHARM REV CODE 250 ALT 637 W/ HCPCS

## 2024-05-05 PROCEDURE — 63600175 PHARM REV CODE 636 W HCPCS

## 2024-05-05 PROCEDURE — 20600001 HC STEP DOWN PRIVATE ROOM

## 2024-05-05 RX ADMIN — HEPARIN SODIUM 7500 UNITS: 5000 INJECTION INTRAVENOUS; SUBCUTANEOUS at 02:05

## 2024-05-05 RX ADMIN — ATORVASTATIN CALCIUM 40 MG: 40 TABLET, FILM COATED ORAL at 09:05

## 2024-05-05 RX ADMIN — METOPROLOL TARTRATE 25 MG: 25 TABLET, FILM COATED ORAL at 09:05

## 2024-05-05 RX ADMIN — PSYLLIUM HUSK 1 PACKET: 3.4 POWDER ORAL at 09:05

## 2024-05-05 RX ADMIN — INSULIN ASPART 4 UNITS: 100 INJECTION, SOLUTION INTRAVENOUS; SUBCUTANEOUS at 11:05

## 2024-05-05 RX ADMIN — Medication 500 MG: at 09:05

## 2024-05-05 RX ADMIN — POLYETHYLENE GLYCOL 3350 17 G: 17 POWDER, FOR SOLUTION ORAL at 09:05

## 2024-05-05 RX ADMIN — INSULIN ASPART 2 UNITS: 100 INJECTION, SOLUTION INTRAVENOUS; SUBCUTANEOUS at 11:05

## 2024-05-05 RX ADMIN — DOCUSATE SODIUM AND SENNOSIDES 1 TABLET: 8.6; 5 TABLET, FILM COATED ORAL at 09:05

## 2024-05-05 RX ADMIN — ZINC SULFATE 220 MG (50 MG) CAPSULE 220 MG: CAPSULE at 09:05

## 2024-05-05 RX ADMIN — INSULIN ASPART 4 UNITS: 100 INJECTION, SOLUTION INTRAVENOUS; SUBCUTANEOUS at 05:05

## 2024-05-05 RX ADMIN — INSULIN ASPART 4 UNITS: 100 INJECTION, SOLUTION INTRAVENOUS; SUBCUTANEOUS at 12:05

## 2024-05-05 RX ADMIN — SEVELAMER CARBONATE 0.8 G: 2400 POWDER, FOR SUSPENSION ORAL at 02:05

## 2024-05-05 RX ADMIN — SEVELAMER CARBONATE 0.8 G: 2400 POWDER, FOR SUSPENSION ORAL at 09:05

## 2024-05-05 RX ADMIN — HEPARIN SODIUM 7500 UNITS: 5000 INJECTION INTRAVENOUS; SUBCUTANEOUS at 05:05

## 2024-05-05 RX ADMIN — HEPARIN SODIUM 7500 UNITS: 5000 INJECTION INTRAVENOUS; SUBCUTANEOUS at 09:05

## 2024-05-05 RX ADMIN — PANTOPRAZOLE SODIUM 40 MG: 40 GRANULE, DELAYED RELEASE ORAL at 09:05

## 2024-05-05 RX ADMIN — INSULIN DETEMIR 27 UNITS: 100 INJECTION, SOLUTION SUBCUTANEOUS at 09:05

## 2024-05-05 RX ADMIN — ASPIRIN 81 MG CHEWABLE TABLET 81 MG: 81 TABLET CHEWABLE at 09:05

## 2024-05-05 NOTE — PLAN OF CARE
Problem: Skin Injury Risk Increased  Goal: Skin Health and Integrity  Outcome: Progressing  Intervention: Optimize Skin Protection  Flowsheets (Taken 5/5/2024 0258)  Pressure Reduction Techniques: weight shift assistance provided  Pressure Reduction Devices: positioning supports utilized  Skin Protection:   incontinence pads utilized   protective footwear used   skin sealant/moisture barrier applied  Activity Management:   Arm raise - L1   Rolling - L1  Head of Bed (HOB) Positioning: HOB at 30-45 degrees     Patient awake, alert , and disoriented.  Pt answers questions when she feels like it. Pain assessed using FACES scale, no evidence of pain.  Pt and daughter other educated on safety, and medication use. Daughter verbalized understanding.  Plan of care discussed with patient and daughter, daughter verbalized understanding.  Bed alarm on.  Camera in use. Call light within reach. Bed  low and locked.

## 2024-05-05 NOTE — ASSESSMENT & PLAN NOTE
Patient's FSGs are controlled on current medication regimen.  Last A1c reviewed-   Lab Results   Component Value Date    HGBA1C 10.4 (H) 01/30/2024     Most recent fingerstick glucose reviewed-   Recent Labs   Lab 05/05/24  0007 05/05/24  0509 05/05/24  0923 05/05/24  1209   POCTGLUCOSE 157* 147* 151* 179*       Current correctional scale  Medium  Maintain anti-hyperglycemic dose as follows-   Antihyperglycemics (From admission, onward)    Start     Stop Route Frequency Ordered    04/17/24 1200  insulin aspart U-100 pen 4 Units         -- SubQ Every 6 hours 04/17/24 0938    04/17/24 0944  insulin aspart U-100 pen 0-10 Units         -- SubQ Every 6 hours PRN 04/17/24 0938    04/13/24 2100  insulin detemir U-100 (Levemir) pen 27 Units         -- SubQ 2 times daily 04/13/24 0931        Hold Oral hypoglycemics while patient is in the hospital.  Aspart 3u q4h  Levemir 27u BID  Anticipate discharge with above regimen given well controlled blood glucose while at goal TF rate  MDSSI  POCT glucose q4h, please give remaining sliding scale requirement if above the scheduled 3u  Ordered current regimen with elevation in glucose

## 2024-05-05 NOTE — PROGRESS NOTES
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 History was given from patient's daughter. She was undergoing dialysis today and noticed she was lethargic, less alert than usual self. Pt completed dialysis and still not acting herself. EMS was called, fever of a 100.  On chart review, she did have an episode of large volume emesis around 1700.  Per EMS, she had a slightly elevated temp 100.0°F, glucose 300s.     In the ED: UA 2+ leuks, >100 WBC, many bacteria, WBC 17, CT abd/pelvis concerning for cystitis. Given vanc/zosyn    Overview/Hospital Course:  Patient was admitted to Hospital Medicine service for medical management and evaluation of urosepsis. Patient was continued on vanc/zosyn. Vascular Neurology consulted concerning mental status change with the recommendation to initiate ASA 81 QD and to obtain an MRI to r/o new stroke. Imaging pending. Nephrology was consulted for regularly scheduled dialysis. Afternoon of 4/12, patient was unable to tolerate filtration of volume during dialsis 2/2 hypotension. Patient received 500cc bolus and was transferred back to floor after finishing the session without volume removed. Will monitor for signs of volume overload. Tailoring insulin regimen while uptitrating tube feeds to goal rate. Staph Epi in all 4 bottles; ID with recommendation to continue Vanc & de-escalate meropenem to ertapenem on 04/15 through 4/16. Patient completed IV course of UTI coverage. Patient tolerated volume removal well in dialysis  throughout the rest of her hospital stay. Pending discharge to LTACH pending bed availability. Patient without BM with one episode of vomiting overnight 4/17. KUB with bowel gas and low concern for obstruction with no transition point noted. Escalating bowel regimen. Pending placement as of 4/22. Pulm consult placed to evaluate for possible trach downsize & eventual decannulation to assist placement if safe. Trach capping trial per pulm for 48h and will assess for decannulation on Monday. DVT studies negative. Pulm successfully decannulated pt 4/30, IR exchanged TDC. Pending swallow study with SLP and waiting for dispo options. Pt failed swallow study, placement pending. Working with PT on mobilize pt to sitting in a chair to expand placement options    Interval History: NAEON pending placement      Objective:     Vital Signs (Most Recent):  Temp: 98.5 °F (36.9 °C) (05/05/24 0918)  Pulse: 92 (05/05/24 1108)  Resp: 18 (05/05/24 0757)  BP: 113/72 (05/05/24 1108)  SpO2: 98 % (05/05/24 1248) Vital Signs (24h Range):  Temp:  [97.8 °F (36.6 °C)-98.9 °F (37.2 °C)] 98.5 °F (36.9 °C)  Pulse:  [] 92  Resp:  [18-20] 18  SpO2:  [97 %-100 %] 98 %  BP: (113-143)/(60-91) 113/72     Weight: 122.5 kg (270 lb 1 oz)  Body mass index is 42.3 kg/m².    Intake/Output Summary (Last 24 hours) at 5/5/2024 1303  Last data filed at 5/5/2024 0952  Gross per 24 hour   Intake 1340 ml   Output 0 ml   Net 1340 ml         Physical Exam  Vitals and nursing note reviewed.   HENT:      Head: Normocephalic and atraumatic.   Neck:      Comments: Trach removed  Cardiovascular:      Rate and Rhythm: Normal rate and regular rhythm.      Heart sounds: Normal heart sounds.   Pulmonary:      Effort: Pulmonary effort is normal. No respiratory distress.      Breath sounds: Normal breath sounds.   Abdominal:      General: Abdomen is flat. There is no distension.      Palpations: Abdomen is soft.      Tenderness: There is no abdominal tenderness.       Comments: PEG tube   Musculoskeletal:      Right lower leg: No edema.      Left lower leg: No edema.      Comments: Moves UEs spontaneously   Skin:     General: Skin is warm and dry.      Comments: CVC bandage removed likely by patient, nursing staff to replace   Neurological:      General: No focal deficit present.      Mental Status: She is alert.      Comments: Nonverbal, not following commands             Significant Labs: All pertinent labs within the past 24 hours have been reviewed.    Significant Imaging: I have reviewed all pertinent imaging results/findings within the past 24 hours.    Assessment/Plan:      * Acute cystitis with hematuria  resolved    Pt presenting with AMS and worsening lethargy. Pt is non-verbal at baseline, does not follow commands, but was less responsive than normal. Pt found to have a fever. Urinary source suspected based off of urine and CT abd/pelvis. Pt has many prior resistant bacterial infections including urinary sources. Has prior with sensitivity to zosyn and has also improved off ED dose of vanc/zosyn. Lactic elevated a 3.8->3.49 prior to completion of fluid bolus 500ml.    Patient with new fever and tachycardia on 4/25. Low threshold to initiate additional infectious workup and repeat UA.     Plan  S/p course of vanc/ertapenem per ID. Course completed 4/16.  Daily cbc  Contact precautions    *on contact precautions indefinitely while patient still makes urine given pt incontinent per infection control    Complication of vascular dialysis catheter  Cath intermittently clogging, not resolving with cath-hedy, going for IR venogram 4/30 with possible exchange. S/p exchange with IR on 4/30      Constipation  RESOLVED with Bowel regimen  Patient without BM x5d. One episode of vomitus night of 4/17. Some concern for bowel obstruction. Tolerating continuous tube feeds at goal rate with 0cc on residuals. Physical exam with bowel sounds, soft nontender abdomen. KUB without concern for  obstruction with bowel gas, lack of transition point.    Patient now with regular bowel movements on bowel regimen.     Plan  - Miralax BID, Senna BID  - One time mag citrate per PEG ordered  - compazine PRN    Moderate malnutrition  Nutrition consulted. Most recent weight and BMI monitored-     Measurements:  Wt Readings from Last 1 Encounters:   05/01/24 122.5 kg (270 lb 1 oz)   Body mass index is 42.3 kg/m².    Patient has been screened and assessed by RD.    Malnutrition Type:  Context: acute illness or injury  Level: moderate    Malnutrition Characteristic Summary:  Weight Loss (Malnutrition): 10% in 6 months  Subcutaneous Fat (Malnutrition): mild depletion  Muscle Mass (Malnutrition): mild depletion  Fluid Accumulation (Malnutrition): mild    Interventions/Recommendations (treatment strategy):  1.    -- TF  -- going for swallow study with SLP to assess possibility of pleasure oral feedings    Prolonged QT interval  Qtc 526, limit Qtc prolonging drugs as able      Spastic hemiplegia of right dominant side as late effect of cerebrovascular disease   This patient has Chronic right hemiplegia due to stroke. Physical therapy services has not been scheduled. Continue all standard measures for pressure injury prevention and consult wound care for any wounds (chronic or acute).    ESRD (end stage renal disease) on dialysis  Creatine stable for now. BMP reviewed- noted Estimated Creatinine Clearance: 17 mL/min (A) (based on SCr of 5.2 mg/dL (H)). according to latest data. Based on current GFR, CKD stage is end stage.  Monitor UOP and serial BMP and adjust therapy as needed. Renally dose meds. Avoid nephrotoxic medications and procedures.    -- HD MWF  -- nephro following  -- sevelamer TID    Status post tracheostomy  Resolved, decannulated 4/30    Acute encephalopathy  Pt is non-verbal and does not follow commands at baseline. Vascular Neurology consulted given acute change from baseline. MRI with concern for evolution  of prior strokes with noted differential of T2 hyperintensities including vasculitis vs demyelination. Discussed with Vascular Neurology; low concern for ongoing vasculitis/demyelination, likely represents typical evolution of prior infarcts.    Patient at baseline as of 4/22.     Plan  - STAT head imaging for concern of new change in mental status/focal deficit    Type 2 diabetes mellitus with hyperglycemia, with long-term current use of insulin  Patient's FSGs are controlled on current medication regimen.  Last A1c reviewed-   Lab Results   Component Value Date    HGBA1C 10.4 (H) 01/30/2024     Most recent fingerstick glucose reviewed-   Recent Labs   Lab 05/05/24  0007 05/05/24  0509 05/05/24  0923 05/05/24  1209   POCTGLUCOSE 157* 147* 151* 179*       Current correctional scale  Medium  Maintain anti-hyperglycemic dose as follows-   Antihyperglycemics (From admission, onward)      Start     Stop Route Frequency Ordered    04/17/24 1200  insulin aspart U-100 pen 4 Units         -- SubQ Every 6 hours 04/17/24 0938    04/17/24 0944  insulin aspart U-100 pen 0-10 Units         -- SubQ Every 6 hours PRN 04/17/24 0938    04/13/24 2100  insulin detemir U-100 (Levemir) pen 27 Units         -- SubQ 2 times daily 04/13/24 0931          Hold Oral hypoglycemics while patient is in the hospital.  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 Pt underwent extensive course, currently still healing from this, but no longer has wound vac. Pt's wound currently covered with bandage. Picture in media tabs    Plan  Wound care        VTE Risk Mitigation (From admission, onward)           Ordered      heparin (porcine) injection 3,000 Units  As needed (PRN)         04/17/24 0825     heparin (porcine) injection 1,000 Units  As needed (PRN)         04/12/24 0951     heparin (porcine) injection 7,500 Units  Every 8 hours         04/11/24 0252                    Discharge Planning   ZEKE: 5/10/2024     Code Status: Full Code   Is the patient medically ready for discharge?: No    Reason for patient still in hospital (select all that apply): Patient trending condition  Discharge Plan A: Skilled Nursing Facility                  Osito Dos Santos MD  Department of Hospital Medicine   [facility] - Telemetry Stepdown

## 2024-05-05 NOTE — SUBJECTIVE & OBJECTIVE
Interval History: NAEON pending placement      Objective:     Vital Signs (Most Recent):  Temp: 98.5 °F (36.9 °C) (05/05/24 0918)  Pulse: 92 (05/05/24 1108)  Resp: 18 (05/05/24 0757)  BP: 113/72 (05/05/24 1108)  SpO2: 98 % (05/05/24 1248) Vital Signs (24h Range):  Temp:  [97.8 °F (36.6 °C)-98.9 °F (37.2 °C)] 98.5 °F (36.9 °C)  Pulse:  [] 92  Resp:  [18-20] 18  SpO2:  [97 %-100 %] 98 %  BP: (113-143)/(60-91) 113/72     Weight: 122.5 kg (270 lb 1 oz)  Body mass index is 42.3 kg/m².    Intake/Output Summary (Last 24 hours) at 5/5/2024 1303  Last data filed at 5/5/2024 0952  Gross per 24 hour   Intake 1340 ml   Output 0 ml   Net 1340 ml         Physical Exam  Vitals and nursing note reviewed.   HENT:      Head: Normocephalic and atraumatic.   Neck:      Comments: Trach removed  Cardiovascular:      Rate and Rhythm: Normal rate and regular rhythm.      Heart sounds: Normal heart sounds.   Pulmonary:      Effort: Pulmonary effort is normal. No respiratory distress.      Breath sounds: Normal breath sounds.   Abdominal:      General: Abdomen is flat. There is no distension.      Palpations: Abdomen is soft.      Tenderness: There is no abdominal tenderness.      Comments: PEG tube   Musculoskeletal:      Right lower leg: No edema.      Left lower leg: No edema.      Comments: Moves UEs spontaneously   Skin:     General: Skin is warm and dry.      Comments: CVC bandage removed likely by patient, nursing staff to replace   Neurological:      General: No focal deficit present.      Mental Status: She is alert.      Comments: Nonverbal, not following commands             Significant Labs: All pertinent labs within the past 24 hours have been reviewed.    Significant Imaging: I have reviewed all pertinent imaging results/findings within the past 24 hours.

## 2024-05-06 LAB
ALBUMIN SERPL BCP-MCNC: 3.2 G/DL (ref 3.5–5.2)
ANION GAP SERPL CALC-SCNC: 13 MMOL/L (ref 8–16)
ANION GAP SERPL CALC-SCNC: 16 MMOL/L (ref 8–16)
BUN SERPL-MCNC: 28 MG/DL (ref 6–20)
BUN SERPL-MCNC: 64 MG/DL (ref 6–20)
CALCIUM SERPL-MCNC: 10.7 MG/DL (ref 8.7–10.5)
CALCIUM SERPL-MCNC: 9.8 MG/DL (ref 8.7–10.5)
CHLORIDE SERPL-SCNC: 89 MMOL/L (ref 95–110)
CHLORIDE SERPL-SCNC: 92 MMOL/L (ref 95–110)
CO2 SERPL-SCNC: 21 MMOL/L (ref 23–29)
CO2 SERPL-SCNC: 24 MMOL/L (ref 23–29)
CREAT SERPL-MCNC: 3.6 MG/DL (ref 0.5–1.4)
CREAT SERPL-MCNC: 6.4 MG/DL (ref 0.5–1.4)
EST. GFR  (NO RACE VARIABLE): 14.5 ML/MIN/1.73 M^2
EST. GFR  (NO RACE VARIABLE): 7.3 ML/MIN/1.73 M^2
GLUCOSE SERPL-MCNC: 107 MG/DL (ref 70–110)
GLUCOSE SERPL-MCNC: 146 MG/DL (ref 70–110)
PHOSPHATE SERPL-MCNC: 4.3 MG/DL (ref 2.7–4.5)
POCT GLUCOSE: 108 MG/DL (ref 70–110)
POCT GLUCOSE: 132 MG/DL (ref 70–110)
POCT GLUCOSE: 143 MG/DL (ref 70–110)
POCT GLUCOSE: 146 MG/DL (ref 70–110)
POCT GLUCOSE: 159 MG/DL (ref 70–110)
POTASSIUM SERPL-SCNC: 3.6 MMOL/L (ref 3.5–5.1)
POTASSIUM SERPL-SCNC: 3.7 MMOL/L (ref 3.5–5.1)
SODIUM SERPL-SCNC: 126 MMOL/L (ref 136–145)
SODIUM SERPL-SCNC: 129 MMOL/L (ref 136–145)

## 2024-05-06 PROCEDURE — 63600175 PHARM REV CODE 636 W HCPCS: Mod: JZ | Performed by: NURSE PRACTITIONER

## 2024-05-06 PROCEDURE — 25000003 PHARM REV CODE 250

## 2024-05-06 PROCEDURE — 25000242 PHARM REV CODE 250 ALT 637 W/ HCPCS

## 2024-05-06 PROCEDURE — 25000003 PHARM REV CODE 250: Performed by: STUDENT IN AN ORGANIZED HEALTH CARE EDUCATION/TRAINING PROGRAM

## 2024-05-06 PROCEDURE — 80048 BASIC METABOLIC PNL TOTAL CA: CPT

## 2024-05-06 PROCEDURE — 25000003 PHARM REV CODE 250: Performed by: INTERNAL MEDICINE

## 2024-05-06 PROCEDURE — 63600175 PHARM REV CODE 636 W HCPCS: Performed by: NURSE PRACTITIONER

## 2024-05-06 PROCEDURE — 20600001 HC STEP DOWN PRIVATE ROOM

## 2024-05-06 PROCEDURE — 92526 ORAL FUNCTION THERAPY: CPT

## 2024-05-06 PROCEDURE — 36415 COLL VENOUS BLD VENIPUNCTURE: CPT

## 2024-05-06 PROCEDURE — 36410 VNPNXR 3YR/> PHY/QHP DX/THER: CPT

## 2024-05-06 PROCEDURE — 76937 US GUIDE VASCULAR ACCESS: CPT

## 2024-05-06 PROCEDURE — 27000207 HC ISOLATION

## 2024-05-06 PROCEDURE — 97530 THERAPEUTIC ACTIVITIES: CPT

## 2024-05-06 PROCEDURE — 80069 RENAL FUNCTION PANEL: CPT

## 2024-05-06 PROCEDURE — 97535 SELF CARE MNGMENT TRAINING: CPT

## 2024-05-06 PROCEDURE — 63600175 PHARM REV CODE 636 W HCPCS

## 2024-05-06 PROCEDURE — 92507 TX SP LANG VOICE COMM INDIV: CPT

## 2024-05-06 PROCEDURE — 25000003 PHARM REV CODE 250: Performed by: NURSE PRACTITIONER

## 2024-05-06 PROCEDURE — 80100014 HC HEMODIALYSIS 1:1

## 2024-05-06 PROCEDURE — C1751 CATH, INF, PER/CENT/MIDLINE: HCPCS

## 2024-05-06 RX ADMIN — HEPARIN SODIUM 3000 UNITS: 1000 INJECTION, SOLUTION INTRAVENOUS; SUBCUTANEOUS at 08:05

## 2024-05-06 RX ADMIN — SEVELAMER CARBONATE 0.8 G: 2400 POWDER, FOR SUSPENSION ORAL at 09:05

## 2024-05-06 RX ADMIN — HEPARIN SODIUM 1000 UNITS: 1000 INJECTION, SOLUTION INTRAVENOUS; SUBCUTANEOUS at 12:05

## 2024-05-06 RX ADMIN — HEPARIN SODIUM 7500 UNITS: 5000 INJECTION INTRAVENOUS; SUBCUTANEOUS at 03:05

## 2024-05-06 RX ADMIN — ATORVASTATIN CALCIUM 40 MG: 40 TABLET, FILM COATED ORAL at 08:05

## 2024-05-06 RX ADMIN — ERYTHROPOIETIN 6100 UNITS: 10000 INJECTION, SOLUTION INTRAVENOUS; SUBCUTANEOUS at 09:05

## 2024-05-06 RX ADMIN — POLYETHYLENE GLYCOL 3350 17 G: 17 POWDER, FOR SOLUTION ORAL at 08:05

## 2024-05-06 RX ADMIN — POLYETHYLENE GLYCOL 3350 17 G: 17 POWDER, FOR SOLUTION ORAL at 09:05

## 2024-05-06 RX ADMIN — INSULIN DETEMIR 27 UNITS: 100 INJECTION, SOLUTION SUBCUTANEOUS at 08:05

## 2024-05-06 RX ADMIN — INSULIN ASPART 4 UNITS: 100 INJECTION, SOLUTION INTRAVENOUS; SUBCUTANEOUS at 05:05

## 2024-05-06 RX ADMIN — Medication 500 MG: at 09:05

## 2024-05-06 RX ADMIN — Medication 500 MG: at 08:05

## 2024-05-06 RX ADMIN — INSULIN DETEMIR 27 UNITS: 100 INJECTION, SOLUTION SUBCUTANEOUS at 09:05

## 2024-05-06 RX ADMIN — HEPARIN SODIUM 7500 UNITS: 5000 INJECTION INTRAVENOUS; SUBCUTANEOUS at 09:05

## 2024-05-06 RX ADMIN — DOCUSATE SODIUM AND SENNOSIDES 1 TABLET: 8.6; 5 TABLET, FILM COATED ORAL at 09:05

## 2024-05-06 RX ADMIN — ZINC SULFATE 220 MG (50 MG) CAPSULE 220 MG: CAPSULE at 08:05

## 2024-05-06 RX ADMIN — SEVELAMER CARBONATE 0.8 G: 2400 POWDER, FOR SUSPENSION ORAL at 03:05

## 2024-05-06 RX ADMIN — SODIUM CHLORIDE: 9 INJECTION, SOLUTION INTRAVENOUS at 08:05

## 2024-05-06 RX ADMIN — PANTOPRAZOLE SODIUM 40 MG: 40 GRANULE, DELAYED RELEASE ORAL at 08:05

## 2024-05-06 RX ADMIN — METOPROLOL TARTRATE 25 MG: 25 TABLET, FILM COATED ORAL at 09:05

## 2024-05-06 RX ADMIN — ASPIRIN 81 MG CHEWABLE TABLET 81 MG: 81 TABLET CHEWABLE at 08:05

## 2024-05-06 RX ADMIN — INSULIN ASPART 4 UNITS: 100 INJECTION, SOLUTION INTRAVENOUS; SUBCUTANEOUS at 12:05

## 2024-05-06 RX ADMIN — INSULIN ASPART 4 UNITS: 100 INJECTION, SOLUTION INTRAVENOUS; SUBCUTANEOUS at 11:05

## 2024-05-06 RX ADMIN — PSYLLIUM HUSK 1 PACKET: 3.4 POWDER ORAL at 09:05

## 2024-05-06 RX ADMIN — INSULIN ASPART 4 UNITS: 100 INJECTION, SOLUTION INTRAVENOUS; SUBCUTANEOUS at 06:05

## 2024-05-06 RX ADMIN — DOCUSATE SODIUM AND SENNOSIDES 1 TABLET: 8.6; 5 TABLET, FILM COATED ORAL at 08:05

## 2024-05-06 RX ADMIN — HEPARIN SODIUM 7500 UNITS: 5000 INJECTION INTRAVENOUS; SUBCUTANEOUS at 05:05

## 2024-05-06 RX ADMIN — SEVELAMER CARBONATE 0.8 G: 2400 POWDER, FOR SUSPENSION ORAL at 08:05

## 2024-05-06 NOTE — PROGRESS NOTES
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 History was given from patient's daughter. She was undergoing dialysis today and noticed she was lethargic, less alert than usual self. Pt completed dialysis and still not acting herself. EMS was called, fever of a 100.  On chart review, she did have an episode of large volume emesis around 1700.  Per EMS, she had a slightly elevated temp 100.0°F, glucose 300s.     In the ED: UA 2+ leuks, >100 WBC, many bacteria, WBC 17, CT abd/pelvis concerning for cystitis. Given vanc/zosyn    Overview/Hospital Course:  Patient was admitted to Hospital Medicine service for medical management and evaluation of urosepsis. Patient was continued on vanc/zosyn. Vascular Neurology consulted concerning mental status change with the recommendation to initiate ASA 81 QD and to obtain an MRI to r/o new stroke. Imaging pending. Nephrology was consulted for regularly scheduled dialysis. Afternoon of 4/12, patient was unable to tolerate filtration of volume during dialsis 2/2 hypotension. Patient received 500cc bolus and was transferred back to floor after finishing the session without volume removed. Will monitor for signs of volume overload. Tailoring insulin regimen while uptitrating tube feeds to goal rate. Staph Epi in all 4 bottles; ID with recommendation to continue Vanc & de-escalate meropenem to ertapenem on 04/15 through 4/16. Patient completed IV course of UTI coverage. Patient tolerated volume removal well in dialysis  throughout the rest of her hospital stay. Pending discharge to LTACH pending bed availability. Patient without BM with one episode of vomiting overnight 4/17. KUB with bowel gas and low concern for obstruction with no transition point noted. Escalating bowel regimen. Pending placement as of 4/22. Pulm consult placed to evaluate for possible trach downsize & eventual decannulation to assist placement if safe. Trach capping trial per pulm for 48h and will assess for decannulation on Monday. DVT studies negative. Pulm successfully decannulated pt 4/30, IR exchanged TDC. Pending swallow study with SLP and waiting for dispo options. Pt failed swallow study, placement pending. Working with PT on mobilize pt to sitting in a chair to expand placement options    Interval History: NAEON. Pending placement      Objective:     Vital Signs (Most Recent):  Temp: 97.8 °F (36.6 °C) (05/06/24 1322)  Pulse: 105 (05/06/24 1322)  Resp: 16 (05/06/24 0830)  BP: 121/81 (05/06/24 1322)  SpO2: 100 % (05/06/24 1322) Vital Signs (24h Range):  Temp:  [97.8 °F (36.6 °C)-98.9 °F (37.2 °C)] 97.8 °F (36.6 °C)  Pulse:  [] 105  Resp:  [16-18] 16  SpO2:  [97 %-100 %] 100 %  BP: (110-165)/(71-94) 121/81     Weight: 122.5 kg (270 lb 1 oz)  Body mass index is 42.3 kg/m².    Intake/Output Summary (Last 24 hours) at 5/6/2024 1333  Last data filed at 5/6/2024 0539  Gross per 24 hour   Intake 740 ml   Output 0 ml   Net 740 ml         Physical Exam  Vitals and nursing note reviewed.   HENT:      Head: Normocephalic and atraumatic.   Neck:      Comments: Trach removed  Cardiovascular:      Rate and Rhythm: Normal rate and regular rhythm.      Heart sounds: Normal heart sounds.   Pulmonary:      Effort: Pulmonary effort is normal. No respiratory distress.      Breath sounds: Normal breath sounds.   Abdominal:      General: Abdomen is flat. There is no distension.      Palpations: Abdomen is soft.      Tenderness: There is no abdominal tenderness.       Comments: PEG tube   Musculoskeletal:      Right lower leg: No edema.      Left lower leg: No edema.      Comments: Moves UEs spontaneously   Skin:     General: Skin is warm and dry.      Comments: CVC bandage removed likely by patient, nursing staff to replace   Neurological:      General: No focal deficit present.      Mental Status: She is alert.      Comments: Nonverbal, not following commands             Significant Labs: All pertinent labs within the past 24 hours have been reviewed.    Significant Imaging: I have reviewed all pertinent imaging results/findings within the past 24 hours.    Assessment/Plan:      * Acute cystitis with hematuria  resolved    Pt presenting with AMS and worsening lethargy. Pt is non-verbal at baseline, does not follow commands, but was less responsive than normal. Pt found to have a fever. Urinary source suspected based off of urine and CT abd/pelvis. Pt has many prior resistant bacterial infections including urinary sources. Has prior with sensitivity to zosyn and has also improved off ED dose of vanc/zosyn. Lactic elevated a 3.8->3.49 prior to completion of fluid bolus 500ml.    Patient with new fever and tachycardia on 4/25. Low threshold to initiate additional infectious workup and repeat UA.     Plan  S/p course of vanc/ertapenem per ID. Course completed 4/16.  Daily cbc  Contact precautions    *on contact precautions indefinitely while patient still makes urine given pt incontinent per infection control    Complication of vascular dialysis catheter  Cath intermittently clogging, not resolving with cath-hedy, going for IR venogram 4/30 with possible exchange. S/p exchange with IR on 4/30      Constipation  RESOLVED with Bowel regimen  Patient without BM x5d. One episode of vomitus night of 4/17. Some concern for bowel obstruction. Tolerating continuous tube feeds at goal rate with 0cc on residuals. Physical exam with bowel sounds, soft nontender abdomen. KUB without concern for  obstruction with bowel gas, lack of transition point.    Patient now with regular bowel movements on bowel regimen.     Plan  - Miralax BID, Senna BID  - One time mag citrate per PEG ordered  - compazine PRN    Moderate malnutrition  Nutrition consulted. Most recent weight and BMI monitored-     Measurements:  Wt Readings from Last 1 Encounters:   05/01/24 122.5 kg (270 lb 1 oz)   Body mass index is 42.3 kg/m².    Patient has been screened and assessed by RD.    Malnutrition Type:  Context: acute illness or injury  Level: moderate    Malnutrition Characteristic Summary:  Weight Loss (Malnutrition): 10% in 6 months  Subcutaneous Fat (Malnutrition): mild depletion  Muscle Mass (Malnutrition): mild depletion  Fluid Accumulation (Malnutrition): mild    Interventions/Recommendations (treatment strategy):  1.    -- TF  -- going for swallow study with SLP to assess possibility of pleasure oral feedings    Prolonged QT interval  Qtc 526, limit Qtc prolonging drugs as able      Spastic hemiplegia of right dominant side as late effect of cerebrovascular disease   This patient has Chronic right hemiplegia due to stroke. Physical therapy services has not been scheduled. Continue all standard measures for pressure injury prevention and consult wound care for any wounds (chronic or acute).    ESRD (end stage renal disease) on dialysis  Creatine stable for now. BMP reviewed- noted Estimated Creatinine Clearance: 13.8 mL/min (A) (based on SCr of 6.4 mg/dL (H)). according to latest data. Based on current GFR, CKD stage is end stage.  Monitor UOP and serial BMP and adjust therapy as needed. Renally dose meds. Avoid nephrotoxic medications and procedures.    -- HD MWF  -- nephro following  -- sevelamer TID    Status post tracheostomy  Resolved, decannulated 4/30    Acute encephalopathy  Pt is non-verbal and does not follow commands at baseline. Vascular Neurology consulted given acute change from baseline. MRI with concern for  evolution of prior strokes with noted differential of T2 hyperintensities including vasculitis vs demyelination. Discussed with Vascular Neurology; low concern for ongoing vasculitis/demyelination, likely represents typical evolution of prior infarcts.    Patient at baseline as of 4/22.     Plan  - STAT head imaging for concern of new change in mental status/focal deficit    Type 2 diabetes mellitus with hyperglycemia, with long-term current use of insulin  Patient's FSGs are controlled on current medication regimen.  Last A1c reviewed-   Lab Results   Component Value Date    HGBA1C 10.4 (H) 01/30/2024     Most recent fingerstick glucose reviewed-   Recent Labs   Lab 05/05/24  1656 05/06/24  0041 05/06/24  0524 05/06/24  1208   POCTGLUCOSE 148* 143* 132* 108       Current correctional scale  Medium  Maintain anti-hyperglycemic dose as follows-   Antihyperglycemics (From admission, onward)      Start     Stop Route Frequency Ordered    04/17/24 1200  insulin aspart U-100 pen 4 Units         -- SubQ Every 6 hours 04/17/24 0938    04/17/24 0944  insulin aspart U-100 pen 0-10 Units         -- SubQ Every 6 hours PRN 04/17/24 0938    04/13/24 2100  insulin detemir U-100 (Levemir) pen 27 Units         -- SubQ 2 times daily 04/13/24 0931          Hold Oral hypoglycemics while patient is in the hospital.  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 Pt underwent extensive course, currently still healing from this, but no longer has wound vac. Pt's wound currently covered with bandage. Picture in media tabs    Plan  Wound care        VTE Risk Mitigation (From admission, onward)           Ordered      heparin (porcine) injection 3,000 Units  As needed (PRN)         04/17/24 0825     heparin (porcine) injection 1,000 Units  As needed (PRN)         04/12/24 0951     heparin (porcine) injection 7,500 Units  Every 8 hours         04/11/24 0252                    Discharge Planning   ZEKE: 5/10/2024     Code Status: Full Code   Is the patient medically ready for discharge?: No    Reason for patient still in hospital (select all that apply): Patient trending condition  Discharge Plan A: Skilled Nursing Facility                  Osito Dos Santos MD  Department of Hospital Medicine   [facility] - Telemetry Stepdown

## 2024-05-06 NOTE — PT/OT/SLP PROGRESS
Occupational Therapy   Treatment    Other Staff Present:  Rita, Rehab Marialuisa Harmon, RN (partial), Del, PCT (partial), GONZALEZ Villalobos (partial)    Name: Sarah Saravia  MRN: [id number]  Admitting Diagnosis:  Acute cystitis with hematuria       Recommendations:     Discharge Recommendations: Moderate Intensity Therapy  Discharge Equipment Recommendations:  to be determined by next level of care  Barriers to discharge:   requires skilled assistance    Assessment:     Sarah Saravia is a 53 y.o. female with a medical diagnosis of Acute cystitis with hematuria.  She presents with performance deficits affecting function are weakness, impaired self care skills, impaired balance, decreased ROM, decreased safety awareness, impaired cognition, gait instability, decreased upper extremity function, decreased lower extremity function, impaired functional mobility, impaired endurance.     Pt engaged well in therapy this date, demonstrated anxiety with transitions. Pt encountered with HOB raised, pt's gown partially off and fidgeting with PEG tube. Pt responded to 3/10 questions with head nod.  Pt redirected throughout beginning of session with personal fidget presented and PEG tube obscured from view.  Pt soiled and received total A belgica-care with refreshed lito pads and gowns. Pt required total A of 2 persons for bed mobility, and total A of 4 persons to transition to medi-chair using slide board.  Pt able to sit upright in medi-chair with belt secured, pillow bolsters under bilateral arms ~20min, and chair arms raised. Pt indicated this date with an affirmative head nod that she enjoyed sitting upright. Cushioned boots placed on pt's bilateral feet. While sitting upright, pt completed BUE TherEx at shoulders and elbows x10 and BLE TherEx at hips and knees x10.  Pt required co-regulation and verbal cues as Medi-Chair laid flat, as she exhibited anxiety at position change.  She was returned to bed at supine with total A of 5  persons with slide board and draw sheet transfer, and left in care of RN. Pt would benefit post acute care at moderate level of intensity.     Rehab Prognosis:  Good; patient would benefit from acute skilled OT services to address these deficits and reach maximum level of function.       Plan:     Patient to be seen 3 x/week to address the above listed problems via self-care/home management, therapeutic activities, therapeutic exercises, neuromuscular re-education  Plan of Care Expires: 05/22/24  Plan of Care Reviewed with: patient    Subjective     Chief Complaint: No complaints this date.   Patient/Family Comments/goals: Get better  Pain/Comfort:  Pain Rating 1: 0/10  Pain Rating Post-Intervention 1: 0/10    Objective:     Communicated with: ELISABETH Harmon prior to session.  Patient found HOB elevated with PEG Tube, pressure relief boots upon OT entry to room. Pt's gown partially off and pt fidgeting with PEG tube, redirected to personal fidget.     General Precautions: Standard, aphasia, aspiration, fall, NPO    Orthopedic Precautions:N/A  Braces: N/A  Respiratory Status: Room air     Occupational Performance:     Bed Mobility:    Patient completed Rolling/Turning to Left with  max assistance and 2 persons across 3 trials  Patient completed Rolling/Turning to Right with max assistance and 2 persons across 3 trials  Patient completed Scooting/Bridging with total assistance and 2 persons  Patient completed Supine to Sit with total assistance while in medi-chair  Patient completed Sit to Supine with total assistance with verbal and tactile cues while in medi-chair    Functional Mobility/Transfers:  Total A x 4 plus slide board with draw sheet to transfer from bed to Medi-chair in flat  Total A x 5 plus slide board with draw sheet to transfer from Medi-chair to bed.     Activities of Daily Living:  Feeding:  total assistance managing PEG tube, placing on HOLD for therapy  Grooming: minimum assistance washing mouth with warm  washcloth, washcloth provided and pt lifted arm to face with OT initiating elbow flexion and pt finishing task.  OT assisted with max A with limited participation from pt to wash remainder of face   Upper Body Dressing: total assistance fixing gown which was partially off pt upon reception, total A doffing soiled gown, donning fresh gown   Lower Body Dressing: total assistance donning bilateral nonslip socks   Toileting: total assistance completing anterior and posterior belgica-care and replacing soiled lito pads     Therapeutic Exercises:   Pt completed BUE TherEx: 10x shoulder flexion with occasional resistance, 10x elbow flexions with occasional resistance while sitting upright in medi-chair  Pt completed BLE TherEx: 10x hip flexions, 10x knee extensions while sitting upright in medi-chair      AMPA 6 Click ADL: 6    Treatment & Education:  Pt educated on role of OT, POC, and goals for therapy.    POC was dicussed with patient/caregiver, who was included in its development and is in agreement with the identified goals and treatment plan.   Patient and family aware of patient's deficits and therapy progression.   Time provided for therapeutic counseling and discussion of health disposition.   Educated on importance of EOB/OOB mobility, maintaining routine, sitting up in chair, and maximizing independence with ADLs during admission   Pt completed ADLs and functional mobility for treatment session as noted above   Pt/caregiver verbalized understanding and expressed no further concerns/questions.  Updated communication board with level of assist required       Patient left HOB elevated with all lines intact, call button in reach, and RN present    GOALS:   Multidisciplinary Problems       Occupational Therapy Goals          Problem: Occupational Therapy    Goal Priority Disciplines Outcome Interventions   Occupational Therapy Goal     OT, PT/OT Progressing    Description: Goals to be met by: 5/22/24     Patient will  increase functional independence with ADLs by performing:    UE Dressing with Maximum Assistance.  Grooming while EOB with Maximum Assistance.  Sitting at edge of bed x5 minutes with Maximum Assistance.  Rolling to Bilateral with Moderate Assistance.   Supine to sit with Maximum Assistance.                         Time Tracking:     OT Date of Treatment: 05/06/24  OT Start Time: 1423  OT Stop Time: 1512  OT Total Time (min): 49 min    Billable Minutes:Therapeutic Activity 49    OT/JOSÉ: OT     Number of JOSÉ visits since last OT visit: 1 5/6/2024

## 2024-05-06 NOTE — PLAN OF CARE
Pt able to tolerate 20min sitting upright in medi-chair this date.     Problem: Occupational Therapy  Goal: Occupational Therapy Goal  Description: Goals to be met by: 5/22/24     Patient will increase functional independence with ADLs by performing:    UE Dressing with Maximum Assistance.  Grooming while EOB with Maximum Assistance.  Sitting at edge of bed x5 minutes with Maximum Assistance.  Rolling to Bilateral with Moderate Assistance.   Supine to sit with Maximum Assistance.    Outcome: Progressing

## 2024-05-06 NOTE — PT/OT/SLP PROGRESS
"Speech Language Pathology Treatment    Patient Name:  Sarah Saravia   MRN:  [id number]  Admitting Diagnosis: Acute cystitis with hematuria    Recommendations:                 General Recommendations:  Dysphagia therapy and Speech/language therapy  Diet recommendations:  NPO, Liquid Diet Level: NPO   Aspiration Precautions: Continue alternate means of nutrition, Frequent oral care, and Strict aspiration precautions   General Precautions: Standard, aphasia, aspiration, fall, NPO  Communication strategies:  go to room if call light pushed    Assessment:     Sarah Saravia is a 53 y.o. female with an SLP diagnosis of Aphasia, Dysphagia, Apraxia, and Cognitive-Linguistic Impairment.      Subjective     Pt was nonverbal, but did vocalize spontaneously to communicate affirmative responses at times.     Respiratory Status: Room air    Objective:     Has the patient been evaluated by SLP for swallowing?   Yes  Keep patient NPO? Yes   Current Respiratory Status:        Pt seen for ongoing aphasia and dysphagia tx.  Pt was awake/alert, but very distractible and fixated on pressing buttons on the call button.  SLP was unable to remove call button from patient's hand to use to point to letters in fo2-3.  Pt made no attempt to identify letters in fo2-3 despite max cues.  She did not identify objects in fo2. However, she did "reach for the cup" and "reach for the comb" after SLP identified each object.  Pt provided responses to simple yes/no q's over 50% of trials with 2 out of 3 of those responses being correct. Pt was able to model "thumbs up" and "thumbs down," but did not utilize to respond to these simple yes/no q's.  Pt did not attempt to vocalize or verbalize during automatic counting tasks, but she did hold up 1, 2, and 3 fingers when SLP encouraged her to count with her fingers.  Pt was positioned upright and given PO trials of ice chips x 1 and 1/2 tsp thin water x 3 for indirect dysphagia tx.  Swallow responses were " palpated. No overt s/s of aspiration were observed.  Pt opened up mouth to allow SLP to visualize oral cavity to ensure clearance of ice chip given visual and tactile cues, but she did not protrude tongue or phonate upon command.  Education was provided to pt regarding role of SLP, establishing reliable yes/no responses, receptive language tasks, automatic speech tasks, PO trials, and SLP treatment plan and POC.  Pt unable to demonstrate understanding 2/2 aphasia. Session ended with call button in hand.  OT planning to work with pt shortly thereafter.     Goals:   Multidisciplinary Problems       SLP Goals          Problem: SLP    Goal Priority Disciplines Outcome   SLP Goal     SLP    Description: Speech Language Pathology Goals  Updated goals expected to be met by 5/10:  1. Pt will participate in ongoing swallowing assessment to determine if appropriate for PO trials for pleasure.   2. Pt will model single step command x 1 given max cues.   3. Pt will answer simple yes/no q's during a structured receptive language task x 1 given max cues.   4. Pt will phonate purposefully upon command x 1 given max cues.   5. Pt will attempt to vocalize/verbalize during automatic speech task x 1 given max cues.   6. Pt will participate in evaluation of ability to utilize basic communication board.     Goals expected to be met by 5/8:  1. Pt will participate in Modified Barium Swallow Study to determine if safe for oral intake. Goal met/attempted 5/2                               Plan:     Patient to be seen:  3 x/week   Plan of Care expires:  05/31/24  Plan of Care reviewed with:  patient   SLP Follow-Up:  Yes       Discharge recommendations:  Moderate Intensity Therapy     Time Tracking:     SLP Treatment Date:   05/06/24  Speech Start Time:  1340  Speech Stop Time:  1404     Speech Total Time (min):  24 min    Billable Minutes: Speech Therapy Individual 8, Treatment Swallowing Dysfunction 8, and Self Care/Home Management  Training 8    05/06/2024     5-Fu Counseling: 5-Fluorouracil Counseling:  I discussed with the patient the risks of 5-fluorouracil including but not limited to erythema, scaling, itching, weeping, crusting, and pain.

## 2024-05-06 NOTE — PLAN OF CARE
Problem: Fluid Volume Excess (Chronic Kidney Disease)  Goal: Fluid Balance  Outcome: Progressing     Problem: Electrolyte Imbalance (Chronic Kidney Disease)  Goal: Electrolyte Balance  Outcome: Progressing

## 2024-05-06 NOTE — PLAN OF CARE
Problem: Infection  Goal: Absence of Infection Signs and Symptoms  Outcome: Progressing     Problem: Skin Injury Risk Increased  Goal: Skin Health and Integrity  Outcome: Progressing     Problem: Adult Inpatient Plan of Care  Goal: Plan of Care Review  Outcome: Progressing  Goal: Patient-Specific Goal (Individualized)  Outcome: Progressing  Goal: Absence of Hospital-Acquired Illness or Injury  Outcome: Progressing  Goal: Optimal Comfort and Wellbeing  Outcome: Progressing  Goal: Readiness for Transition of Care  Outcome: Progressing     Problem: Bariatric Environmental Safety  Goal: Safety Maintained with Care  Outcome: Progressing     Problem: Device-Related Complication Risk (Hemodialysis)  Goal: Safe, Effective Therapy Delivery  Outcome: Progressing     Problem: Hemodynamic Instability (Hemodialysis)  Goal: Effective Tissue Perfusion  Outcome: Progressing     Problem: Infection (Hemodialysis)  Goal: Absence of Infection Signs and Symptoms  Outcome: Progressing     Problem: Diabetes Comorbidity  Goal: Blood Glucose Level Within Targeted Range  Outcome: Progressing     Problem: Adjustment to Illness (Sepsis/Septic Shock)  Goal: Optimal Coping  Outcome: Progressing     Problem: Glycemic Control Impaired (Sepsis/Septic Shock)  Goal: Blood Glucose Level Within Desired Range  Outcome: Progressing     Problem: Infection Progression (Sepsis/Septic Shock)  Goal: Absence of Infection Signs and Symptoms  Outcome: Progressing     Problem: Nutrition Impaired (Sepsis/Septic Shock)  Goal: Optimal Nutrition Intake  Outcome: Progressing     Problem: Fall Injury Risk  Goal: Absence of Fall and Fall-Related Injury  Outcome: Progressing     Problem: Adjustment to Illness (Chronic Kidney Disease)  Goal: Optimal Coping with Chronic Illness  Outcome: Progressing  Goal: Electrolyte Balance  Outcome: Progressing  Goal: Fluid Balance  Outcome: Progressing     Problem: Electrolyte Imbalance (Chronic Kidney Disease)  Goal: Electrolyte  Balance  Outcome: Progressing     Problem: Fluid Volume Excess (Chronic Kidney Disease)  Goal: Fluid Balance  Outcome: Progressing     Problem: Functional Decline (Chronic Kidney Disease)  Goal: Optimal Functional Ability  Outcome: Progressing     Problem: Hematologic Alteration (Chronic Kidney Disease)  Goal: Absence of Anemia Signs and Symptoms  Outcome: Progressing     Problem: Oral Intake Inadequate (Chronic Kidney Disease)  Goal: Optimal Oral Intake  Outcome: Progressing     Problem: Pain (Chronic Kidney Disease)  Goal: Acceptable Pain Control  Outcome: Progressing     Problem: Renal Function Impairment (Chronic Kidney Disease)  Goal: Minimize Renal Failure Effects  Outcome: Progressing     Problem: Restraint, Nonviolent  Goal: Absence of Harm or Injury  Outcome: Progressing     Problem: Wound  Goal: Optimal Coping  Outcome: Progressing  Goal: Optimal Functional Ability  Outcome: Progressing  Goal: Absence of Infection Signs and Symptoms  Outcome: Progressing  Goal: Improved Oral Intake  Outcome: Progressing  Goal: Optimal Pain Control and Function  Outcome: Progressing  Goal: Skin Health and Integrity  Outcome: Progressing  Goal: Optimal Wound Healing  Outcome: Progressing

## 2024-05-06 NOTE — NURSING
Patient off unit to dialysis per transport via stretcher, patient awake, alert, VSS, Nad noted upon transfer

## 2024-05-06 NOTE — PLAN OF CARE
Problem: Skin Injury Risk Increased  Goal: Skin Health and Integrity  Intervention: Optimize Skin Protection  Flowsheets (Taken 5/6/2024 0239)  Pressure Reduction Techniques: weight shift assistance provided  Pressure Reduction Devices: heel offloading device utilized  Skin Protection:   skin sealant/moisture barrier applied   incontinence pads utilized  Activity Management:   Rolling - L1   Arm raise - L1  Head of Bed (HOB) Positioning: HOB at 30-45 degrees  Intervention: Promote and Optimize Oral Intake  Flowsheets (Taken 5/6/2024 0239)  Oral Nutrition Promotion: rest periods promoted  Nutrition Interventions: (water provided.) other (see comments)     Patient awake, alert , and disoriented.  Pt answers questions at times. Pain assessed using FACES scale, no evidence of pain.  Pt and daughter educated on safety, and medication use. Daughter verbalized understanding.  Plan of care discussed with patient and daughter, sheverbalized understanding.  Bed alarm on.  Safety camera in use.  Call light within reach. Bed  low and locked.

## 2024-05-06 NOTE — PROGRESS NOTES
Pt to [facility] via bed for HD. Isolation precautions maintained. 3.5hr treatment started via RIJ tunnel CVC. Some issues with air alarm on machine resulting in a line change without the rinse back of blood. Np notified of blood loss and low sodium. Orders for 133 sodium. Pt to [facility] with CVC dressing half off, sterile dressing changed completed. VSS. NAD noted

## 2024-05-06 NOTE — SUBJECTIVE & OBJECTIVE
Interval History: NAEON. Pending placement      Objective:     Vital Signs (Most Recent):  Temp: 97.8 °F (36.6 °C) (05/06/24 1322)  Pulse: 105 (05/06/24 1322)  Resp: 16 (05/06/24 0830)  BP: 121/81 (05/06/24 1322)  SpO2: 100 % (05/06/24 1322) Vital Signs (24h Range):  Temp:  [97.8 °F (36.6 °C)-98.9 °F (37.2 °C)] 97.8 °F (36.6 °C)  Pulse:  [] 105  Resp:  [16-18] 16  SpO2:  [97 %-100 %] 100 %  BP: (110-165)/(71-94) 121/81     Weight: 122.5 kg (270 lb 1 oz)  Body mass index is 42.3 kg/m².    Intake/Output Summary (Last 24 hours) at 5/6/2024 1333  Last data filed at 5/6/2024 0539  Gross per 24 hour   Intake 740 ml   Output 0 ml   Net 740 ml         Physical Exam  Vitals and nursing note reviewed.   HENT:      Head: Normocephalic and atraumatic.   Neck:      Comments: Trach removed  Cardiovascular:      Rate and Rhythm: Normal rate and regular rhythm.      Heart sounds: Normal heart sounds.   Pulmonary:      Effort: Pulmonary effort is normal. No respiratory distress.      Breath sounds: Normal breath sounds.   Abdominal:      General: Abdomen is flat. There is no distension.      Palpations: Abdomen is soft.      Tenderness: There is no abdominal tenderness.      Comments: PEG tube   Musculoskeletal:      Right lower leg: No edema.      Left lower leg: No edema.      Comments: Moves UEs spontaneously   Skin:     General: Skin is warm and dry.      Comments: CVC bandage removed likely by patient, nursing staff to replace   Neurological:      General: No focal deficit present.      Mental Status: She is alert.      Comments: Nonverbal, not following commands             Significant Labs: All pertinent labs within the past 24 hours have been reviewed.    Significant Imaging: I have reviewed all pertinent imaging results/findings within the past 24 hours.

## 2024-05-06 NOTE — CARE UPDATE
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    Last VS: /72 (BP Location: Left arm, Patient Position: Lying)   Pulse 105   Temp 97.9 °F (36.6 °C) (Oral)   Resp 18   Ht 5' 7" (1.702 m)   Wt 122.5 kg (270 lb 1 oz)   SpO2 100%   BMI 42.30 kg/m²     24H Vital Sign Range:  Temp:  [97.7 °F (36.5 °C)-98.9 °F (37.2 °C)]   Pulse:  []   Resp:  [16-18]   BP: (110-165)/(70-94)   SpO2:  [97 %-100 %]     Level of Consciousness (AVPU): alert    No results for input(s): "CBC", "WBC", "HGB", "HCT", "PLT" in the last 72 hours.    Recent Labs     05/06/24  0219   *   K 3.6   CL 89*   CO2 21*   BUN 64*   CREATININE 6.4*      PHOS 4.3        No results for input(s): "PH", "PCO2", "PO2", "HCO3", "POCSATURATED", "BE" in the last 72 hours.     OXYGEN:  Flow (L/min) (Oxygen Therapy): 2  Oxygen Concentration (%): 21       MEWS score: 4    Bedside RNFaustino  No additional concerns verbalized at this time. Instructed to call [phone] for further concerns or assistance.    Kristopher Valencia RN        "

## 2024-05-06 NOTE — PROGRESS NOTES
[facility] NOTE     Patient currently on hemodialysis for removal of uremic toxins .     Patient seen and evaluated on hemodialysis, tolerating treatment, see HD flowsheet for vitals and assessments.      No Hypotension, chest pain, shortness of breath, cramping, nausea or vomiting.     Target UF: 2 L as tolerated, keep MAP >65.  Awaiting placement.   Continue EPO  Continue Sevelamer.   Encouraged patient to limit fluid intake to 32 oz per day.   No lab stick/BP intake on access site  Continue to monitor intake and output, daily weights   Please avoid gadolinium, fleets, phos-based laxatives, NSAIDs  Will follow closely and continue dialysis treatments while in-patient  Will collect PTH in AM    D. POONAM Gregory, APRN, FNP-C  Department of Nephrology  [facility]  Pager: [phone]

## 2024-05-06 NOTE — PT/OT/SLP PROGRESS
Physical Therapy      Patient Name:  Sarah Saravia   MRN:  [id number]    Patient not seen today secondary to Dialysis, Other (Comment) (2 attempts for tx session: 1. Pt URIEL away at HD in am; 2. Pt recently finished working with OT in pm). Will follow-up on next scheduled visit.

## 2024-05-06 NOTE — PROGRESS NOTES
3.5hr HD complete. 2L fluid removed. RIJ tunnel CVC dressing changed and heparin locked. Report given to RN. Pt left [facility] via bed.

## 2024-05-06 NOTE — PROCEDURES
NSCCU MIDLINE NOTE    Single lumen 20G, 10CM midline placed in the right cephalic vein. Needle advanced into the vessel under real time ultrasound guidance.    Max dwell date: 6/4/2024   Lot number: [id number]

## 2024-05-06 NOTE — ASSESSMENT & PLAN NOTE
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 Pt underwent extensive course, currently still healing from this, but no longer has wound vac. Pt's wound currently covered with bandage. Picture in media tabs    Plan  Wound care     no...

## 2024-05-06 NOTE — ASSESSMENT & PLAN NOTE
Patient's FSGs are controlled on current medication regimen.  Last A1c reviewed-   Lab Results   Component Value Date    HGBA1C 10.4 (H) 01/30/2024     Most recent fingerstick glucose reviewed-   Recent Labs   Lab 05/05/24  1656 05/06/24  0041 05/06/24  0524 05/06/24  1208   POCTGLUCOSE 148* 143* 132* 108       Current correctional scale  Medium  Maintain anti-hyperglycemic dose as follows-   Antihyperglycemics (From admission, onward)    Start     Stop Route Frequency Ordered    04/17/24 1200  insulin aspart U-100 pen 4 Units         -- SubQ Every 6 hours 04/17/24 0938    04/17/24 0944  insulin aspart U-100 pen 0-10 Units         -- SubQ Every 6 hours PRN 04/17/24 0938    04/13/24 2100  insulin detemir U-100 (Levemir) pen 27 Units         -- SubQ 2 times daily 04/13/24 0931        Hold Oral hypoglycemics while patient is in the hospital.  Aspart 3u q4h  Levemir 27u BID  Anticipate discharge with above regimen given well controlled blood glucose while at goal TF rate  MDSSI  POCT glucose q4h, please give remaining sliding scale requirement if above the scheduled 3u  Ordered current regimen with elevation in glucose

## 2024-05-07 LAB
BASOPHILS # BLD AUTO: 0.06 K/UL (ref 0–0.2)
BASOPHILS NFR BLD: 0.6 % (ref 0–1.9)
DIFFERENTIAL METHOD BLD: ABNORMAL
EOSINOPHIL # BLD AUTO: 0.4 K/UL (ref 0–0.5)
EOSINOPHIL NFR BLD: 4.3 % (ref 0–8)
ERYTHROCYTE [DISTWIDTH] IN BLOOD BY AUTOMATED COUNT: 14.6 % (ref 11.5–14.5)
HCT VFR BLD AUTO: 34.3 % (ref 37–48.5)
HGB BLD-MCNC: 10.5 G/DL (ref 12–16)
IMM GRANULOCYTES # BLD AUTO: 0.08 K/UL (ref 0–0.04)
IMM GRANULOCYTES NFR BLD AUTO: 0.8 % (ref 0–0.5)
LYMPHOCYTES # BLD AUTO: 2.5 K/UL (ref 1–4.8)
LYMPHOCYTES NFR BLD: 25.8 % (ref 18–48)
MCH RBC QN AUTO: 26.7 PG (ref 27–31)
MCHC RBC AUTO-ENTMCNC: 30.6 G/DL (ref 32–36)
MCV RBC AUTO: 87 FL (ref 82–98)
MONOCYTES # BLD AUTO: 1.3 K/UL (ref 0.3–1)
MONOCYTES NFR BLD: 13.8 % (ref 4–15)
NEUTROPHILS # BLD AUTO: 5.3 K/UL (ref 1.8–7.7)
NEUTROPHILS NFR BLD: 54.7 % (ref 38–73)
NRBC BLD-RTO: 0 /100 WBC
PLATELET # BLD AUTO: 286 K/UL (ref 150–450)
PMV BLD AUTO: 10 FL (ref 9.2–12.9)
POCT GLUCOSE: 133 MG/DL (ref 70–110)
POCT GLUCOSE: 160 MG/DL (ref 70–110)
POCT GLUCOSE: 160 MG/DL (ref 70–110)
POCT GLUCOSE: 162 MG/DL (ref 70–110)
PTH-INTACT SERPL-MCNC: 28.7 PG/ML (ref 9–77)
RBC # BLD AUTO: 3.93 M/UL (ref 4–5.4)
WBC # BLD AUTO: 9.61 K/UL (ref 3.9–12.7)

## 2024-05-07 PROCEDURE — 27000207 HC ISOLATION

## 2024-05-07 PROCEDURE — 94761 N-INVAS EAR/PLS OXIMETRY MLT: CPT

## 2024-05-07 PROCEDURE — 97112 NEUROMUSCULAR REEDUCATION: CPT | Mod: CQ

## 2024-05-07 PROCEDURE — 85025 COMPLETE CBC W/AUTO DIFF WBC: CPT | Performed by: STUDENT IN AN ORGANIZED HEALTH CARE EDUCATION/TRAINING PROGRAM

## 2024-05-07 PROCEDURE — 25000242 PHARM REV CODE 250 ALT 637 W/ HCPCS

## 2024-05-07 PROCEDURE — 25000003 PHARM REV CODE 250

## 2024-05-07 PROCEDURE — 63600175 PHARM REV CODE 636 W HCPCS

## 2024-05-07 PROCEDURE — 25000003 PHARM REV CODE 250: Performed by: INTERNAL MEDICINE

## 2024-05-07 PROCEDURE — 36415 COLL VENOUS BLD VENIPUNCTURE: CPT | Performed by: STUDENT IN AN ORGANIZED HEALTH CARE EDUCATION/TRAINING PROGRAM

## 2024-05-07 PROCEDURE — 36415 COLL VENOUS BLD VENIPUNCTURE: CPT | Performed by: NURSE PRACTITIONER

## 2024-05-07 PROCEDURE — 83970 ASSAY OF PARATHORMONE: CPT | Performed by: NURSE PRACTITIONER

## 2024-05-07 PROCEDURE — 25000003 PHARM REV CODE 250: Performed by: STUDENT IN AN ORGANIZED HEALTH CARE EDUCATION/TRAINING PROGRAM

## 2024-05-07 PROCEDURE — 97530 THERAPEUTIC ACTIVITIES: CPT | Mod: CQ

## 2024-05-07 PROCEDURE — 97110 THERAPEUTIC EXERCISES: CPT | Mod: CQ

## 2024-05-07 PROCEDURE — 20600001 HC STEP DOWN PRIVATE ROOM

## 2024-05-07 RX ORDER — SODIUM CHLORIDE 9 MG/ML
INJECTION, SOLUTION INTRAVENOUS ONCE
Status: COMPLETED | OUTPATIENT
Start: 2024-05-08 | End: 2024-05-08

## 2024-05-07 RX ADMIN — METOPROLOL TARTRATE 25 MG: 25 TABLET, FILM COATED ORAL at 10:05

## 2024-05-07 RX ADMIN — PANTOPRAZOLE SODIUM 40 MG: 40 GRANULE, DELAYED RELEASE ORAL at 10:05

## 2024-05-07 RX ADMIN — INSULIN ASPART 4 UNITS: 100 INJECTION, SOLUTION INTRAVENOUS; SUBCUTANEOUS at 06:05

## 2024-05-07 RX ADMIN — INSULIN ASPART 4 UNITS: 100 INJECTION, SOLUTION INTRAVENOUS; SUBCUTANEOUS at 01:05

## 2024-05-07 RX ADMIN — SEVELAMER CARBONATE 0.8 G: 2400 POWDER, FOR SUSPENSION ORAL at 10:05

## 2024-05-07 RX ADMIN — INSULIN ASPART 2 UNITS: 100 INJECTION, SOLUTION INTRAVENOUS; SUBCUTANEOUS at 12:05

## 2024-05-07 RX ADMIN — HEPARIN SODIUM 7500 UNITS: 5000 INJECTION INTRAVENOUS; SUBCUTANEOUS at 10:05

## 2024-05-07 RX ADMIN — DOCUSATE SODIUM AND SENNOSIDES 1 TABLET: 8.6; 5 TABLET, FILM COATED ORAL at 10:05

## 2024-05-07 RX ADMIN — METOPROLOL TARTRATE 25 MG: 25 TABLET, FILM COATED ORAL at 09:05

## 2024-05-07 RX ADMIN — INSULIN DETEMIR 27 UNITS: 100 INJECTION, SOLUTION SUBCUTANEOUS at 09:05

## 2024-05-07 RX ADMIN — Medication 500 MG: at 09:05

## 2024-05-07 RX ADMIN — ATORVASTATIN CALCIUM 40 MG: 40 TABLET, FILM COATED ORAL at 10:05

## 2024-05-07 RX ADMIN — PSYLLIUM HUSK 1 PACKET: 3.4 POWDER ORAL at 09:05

## 2024-05-07 RX ADMIN — INSULIN ASPART 4 UNITS: 100 INJECTION, SOLUTION INTRAVENOUS; SUBCUTANEOUS at 05:05

## 2024-05-07 RX ADMIN — ASPIRIN 81 MG CHEWABLE TABLET 81 MG: 81 TABLET CHEWABLE at 10:05

## 2024-05-07 RX ADMIN — ZINC SULFATE 220 MG (50 MG) CAPSULE 220 MG: CAPSULE at 10:05

## 2024-05-07 RX ADMIN — SEVELAMER CARBONATE 0.8 G: 2400 POWDER, FOR SUSPENSION ORAL at 03:05

## 2024-05-07 RX ADMIN — HEPARIN SODIUM 7500 UNITS: 5000 INJECTION INTRAVENOUS; SUBCUTANEOUS at 06:05

## 2024-05-07 RX ADMIN — HEPARIN SODIUM 7500 UNITS: 5000 INJECTION INTRAVENOUS; SUBCUTANEOUS at 03:05

## 2024-05-07 RX ADMIN — INSULIN ASPART 1 UNITS: 100 INJECTION, SOLUTION INTRAVENOUS; SUBCUTANEOUS at 06:05

## 2024-05-07 RX ADMIN — INSULIN ASPART 4 UNITS: 100 INJECTION, SOLUTION INTRAVENOUS; SUBCUTANEOUS at 11:05

## 2024-05-07 RX ADMIN — INSULIN DETEMIR 27 UNITS: 100 INJECTION, SOLUTION SUBCUTANEOUS at 08:05

## 2024-05-07 RX ADMIN — Medication 500 MG: at 10:05

## 2024-05-07 RX ADMIN — POLYETHYLENE GLYCOL 3350 17 G: 17 POWDER, FOR SOLUTION ORAL at 10:05

## 2024-05-07 RX ADMIN — SEVELAMER CARBONATE 0.8 G: 2400 POWDER, FOR SUSPENSION ORAL at 09:05

## 2024-05-07 NOTE — PLAN OF CARE
Problem: Infection  Goal: Absence of Infection Signs and Symptoms  Outcome: Progressing     Problem: Skin Injury Risk Increased  Goal: Skin Health and Integrity  Outcome: Progressing     Problem: Adult Inpatient Plan of Care  Goal: Plan of Care Review  Outcome: Progressing  Goal: Patient-Specific Goal (Individualized)  Outcome: Progressing  Goal: Absence of Hospital-Acquired Illness or Injury  Outcome: Progressing  Goal: Optimal Comfort and Wellbeing  Outcome: Progressing  Goal: Readiness for Transition of Care  Outcome: Progressing

## 2024-05-07 NOTE — PT/OT/SLP PROGRESS
Physical Therapy Treatment      Patient Name:  Sarah Saravia   MRN:  [id number]    Recommendations:     Discharge Recommendations: Moderate Intensity Therapy  Discharge Equipment Recommendations: to be determined by next level of care  Barriers to discharge: Pt requiring increased skilled assistance at current time.     Assessment:     Sarah Saravia is a 53 y.o. female admitted with a medical diagnosis of Acute cystitis with hematuria.  She presents with the following impairments/functional limitations: weakness, impaired endurance, impaired sensation, impaired self care skills, impaired functional mobility, gait instability, impaired balance, impaired cognition, decreased coordination, decreased upper extremity function, decreased lower extremity function, decreased safety awareness, pain, impaired coordination, impaired skin, edema requiring total assistance of 2 helpers and verbal cues for bed mob, scooting to EOB due to weakness, fear of falling, cognitive deficits.   In light of pt's current functional level and deficits, it is anticipated that pt will need to participate in a moderate intensity rehab program consisting of PT and OT in order to achieve full rehab potential to return to previous level of function and roles.  Pt remains motivated to participate in PT session and will cont to benefit from skilled PT intervention..    Rehab Prognosis: Good; patient would benefit from acute skilled PT services to address these deficits and reach maximum level of function.    Recent Surgery: * No surgery found *      Plan:     During this hospitalization, patient to be seen 3 x/week to address the identified rehab impairments via gait training, therapeutic activities, therapeutic exercises, neuromuscular re-education and progress toward the following goals:    Plan of Care Expires:  05/22/24    Subjective     Chief Complaint: weakness, pain, fear of falling  Pain/Comfort:  Pain Rating 1:  (no rating  provided)  Location - Side 1: Bilateral  Location - Orientation 1: generalized  Location 1: leg  Pain Addressed 1: Reposition, Distraction, Cessation of Activity  Pain Rating Post-Intervention 1:  (no rating provided)      Objective:     2 attempts for tx session due to nursing care at 10:30 am    Communicated with nurse (Faustino) prior to session and notified them of intent to assist pt OOB to medichair.  Patient found  L side lying with HOB at 55* angle  with blood pressure cuff, pressure relief boots, pulse ox (continuous), telemetry  (significant other (Mr Riley) present upon PT entry to room.     General Precautions: Standard, aphasia, aspiration, fall, NPO, contact (ESBL/MDRO in urine)  Orthopedic Precautions: N/A  Braces: N/A  Respiratory Status: Room air     Functional Mobility:  Bed Mobility:     Rolling Left:  total A of 2 with use of rail  Rolling Right: total A of 2 with use of rail  Scooting: anteriorly to the EOB with total A of 1  Supine to Sit: total A of 2 for trunk elevation and LE's, exiting on the R side, HOB at 20* angle  Transfers:     Sit to Stand:   attempted with B HHA support from moderately raised EOB, however, unable to perform due to pt resisting with fear of falling    Bed to Medichair: dependence and of 4 persons (Student PTA, PCT (Zuly) and pt's significant other) with  slide board and drawsheet     Balance: static sitting at the EOB with B UE with close sup; dynamic sitting (reaching forward to touch SO's hand with UE's with single UE support) with min/mod A for trunk (pt with poor initiation).  Total sitting time 15 min  Pt performs B LE AA/PROM ther ex's while sup in bed x12 reps with vc's prior to sitting at the EOB        AM-PAC 6 CLICK MOBILITY  Turning over in bed (including adjusting bedclothes, sheets and blankets)?: 2  Sitting down on and standing up from a chair with arms (e.g., wheelchair, bedside commode, etc.): 1  Moving from lying on back to sitting on the side of the  bed?: 2  Moving to and from a bed to a chair (including a wheelchair)?: 1  Need to walk in hospital room?: 1  Climbing 3-5 steps with a railing?: 1  Basic Mobility Total Score: 8       Treatment & Education:  Patient provided with daily orientation and goals of this PT session. Pt and significant other were educated to call for assistance and to transfer with hospital staff only.  Also, pt was educated on the effects of prolonged immobility and the importance of performing OOB activity and exercises to promote healing and reduce recovery time.  Significant other (Mr Riley) and nurse (Faustino) educated on encouraging pt to sit 2 hours, but at least 1 hour today)    Patient left  up in medichair  with all lines intact, call button in reach, nurse notified, and SO present..    GOALS:   Multidisciplinary Problems       Physical Therapy Goals          Problem: Physical Therapy    Goal Priority Disciplines Outcome Goal Variances Interventions   Physical Therapy Goal     PT, PT/OT Progressing     Description: Goals to be met by: 24   Goals remain appropriate 5/3/2024 to be met by 24    Patient will increase functional independence with mobility by performin. Supine to sit with Maximum Assistance  2. Sit to supine with Maximum Assistance  3. Rolling to Left and Right with Moderate Assistance.  4. Sitting at edge of bed 5 minutes with Moderate Assistance- MET , monitor consistency  5. Lower extremity exercise program x20 reps per handout, with assistance as needed                         Time Tracking:     PT Received On: 24  PT Start Time: 1139     PT Stop Time: 1236  PT Total Time (min): 57 min     Billable Minutes: Therapeutic Activity 27, Therapeutic Exercise 15, and Neuromuscular Re-education 15    Treatment Type: Treatment  PT/PTA: PTA     Number of PTA visits since last PT visit: 2024

## 2024-05-07 NOTE — SUBJECTIVE & OBJECTIVE
Interval History: No acute events overnight, afebrile, hemodynamically stable. Pending placement.     Review of Systems   Unable to perform ROS: Patient nonverbal     Objective:     Vital Signs (Most Recent):  Temp: 98.7 °F (37.1 °C) (05/07/24 0756)  Pulse: 90 (05/07/24 1000)  Resp: 18 (05/07/24 0756)  BP: 124/77 (05/07/24 1000)  SpO2: 98 % (05/07/24 0756) Vital Signs (24h Range):  Temp:  [97.7 °F (36.5 °C)-99.6 °F (37.6 °C)] 98.7 °F (37.1 °C)  Pulse:  [] 90  Resp:  [16-19] 18  SpO2:  [98 %-100 %] 98 %  BP: (110-149)/(70-84) 124/77     Weight: 122.5 kg (270 lb 1 oz)  Body mass index is 42.3 kg/m².    Intake/Output Summary (Last 24 hours) at 5/7/2024 1150  Last data filed at 5/6/2024 1843  Gross per 24 hour   Intake 1690.91 ml   Output 2950 ml   Net -1259.09 ml         Physical Exam  Vitals and nursing note reviewed.   HENT:      Head: Normocephalic and atraumatic.   Cardiovascular:      Rate and Rhythm: Normal rate and regular rhythm.      Heart sounds: Normal heart sounds.   Pulmonary:      Effort: Pulmonary effort is normal. No respiratory distress.      Breath sounds: Normal breath sounds.   Abdominal:      General: Bowel sounds are normal. There is no distension.      Palpations: Abdomen is soft.      Tenderness: There is no abdominal tenderness. There is no guarding or rebound.      Comments: PEG tube   Musculoskeletal:      Right lower leg: No edema.      Left lower leg: No edema.      Comments: Moves Upper extremities spontaneously   Skin:     General: Skin is warm and dry.   Neurological:      General: No focal deficit present.      Mental Status: She is alert.      Comments: Nonverbal, not following commands           Significant Labs: All pertinent labs within the past 24 hours have been reviewed.  LABS:  Recent Labs   Lab 05/03/24  0544 05/06/24  0219 05/06/24  1703   * 126* 129*   K 3.7 3.6 3.7   CL 93* 89* 92*   CO2 21* 21* 24   BUN 44* 64* 28*   CREATININE 5.2* 6.4* 3.6*   * 107 146*    ANIONGAP 14 16 13     Recent Labs   Lab 05/01/24  0641 05/03/24  0544 05/06/24  0219   MG 2.3  --   --    PHOS 5.4* 3.8 4.3     Recent Labs   Lab 05/01/24  0641 05/03/24  0544 05/06/24  0219   ALBUMIN 3.1* 3.0* 3.2*     POCT Glucose:   Recent Labs   Lab 05/06/24  1804 05/06/24  2305 05/07/24  0641   POCTGLUCOSE 159* 146* 162*    Recent Labs   Lab 05/07/24  1028   WBC 9.61   HGB 10.5*   HCT 34.3*      GRAN 54.7  5.3            Significant Imaging: I have reviewed all pertinent imaging results/findings within the past 24 hours.      Inpatient Medications:  Continuous Infusions:   dextrose 10 % in water (D10W)   Intravenous Continuous PRN         Scheduled Meds:   [START ON 5/8/2024] sodium chloride 0.9%   Intravenous Once    ascorbic acid (vitamin C)  500 mg Per G Tube BID    aspirin  81 mg Per G Tube Daily    atorvastatin  40 mg Per G Tube Daily    epoetin merry (PROCRIT) injection  50 Units/kg Intravenous Every Mon, Wed, Fri    heparin (porcine)  7,500 Units Subcutaneous Q8H    insulin aspart U-100  4 Units Subcutaneous Q6H    insulin detemir U-100 (Levemir)  27 Units Subcutaneous BID    metoprolol tartrate  25 mg Per G Tube BID    pantoprazole  40 mg Per G Tube Daily    polyethylene glycol  17 g Per G Tube BID    psyllium husk (aspartame)  1 packet Per G Tube Daily    senna-docusate 8.6-50 mg  1 tablet Per G Tube BID    sevelamer carbonate  0.8 g Per G Tube TID    zinc sulfate  220 mg Per G Tube Daily     PRN Meds:  Current Facility-Administered Medications:     dextrose 10 % in water (D10W), , Intravenous, Continuous PRN    dextrose 10%, 12.5 g, Intravenous, PRN    dextrose 10%, 25 g, Intravenous, PRN    glucagon (human recombinant), 1 mg, Intramuscular, PRN    glucose, 16 g, Oral, PRN    glucose, 24 g, Oral, PRN    heparin (porcine), 1,000 Units, Intra-Catheter, PRN    heparin (porcine), 3,000 Units, Intravenous, PRN    insulin aspart U-100, 0-10 Units, Subcutaneous, Q6H PRN    naloxone, 0.02 mg,  Intravenous, PRN    ondansetron, 4 mg, Intravenous, Q6H PRN    sodium chloride 0.9%, 10 mL, Intravenous, Q12H PRN;

## 2024-05-07 NOTE — ASSESSMENT & PLAN NOTE
Patient's FSGs are controlled on current medication regimen.  Last A1c reviewed-   Lab Results   Component Value Date    HGBA1C 10.4 (H) 01/30/2024     Most recent fingerstick glucose reviewed-   Recent Labs   Lab 05/06/24  1208 05/06/24  1804 05/06/24  2305 05/07/24  0641   POCTGLUCOSE 108 159* 146* 162*     Current correctional scale  Medium  Maintain anti-hyperglycemic dose as follows-   Antihyperglycemics (From admission, onward)      Start     Stop Route Frequency Ordered    04/17/24 1200  insulin aspart U-100 pen 4 Units         -- SubQ Every 6 hours 04/17/24 0938    04/17/24 0944  insulin aspart U-100 pen 0-10 Units         -- SubQ Every 6 hours PRN 04/17/24 0938    04/13/24 2100  insulin detemir U-100 (Levemir) pen 27 Units         -- SubQ 2 times daily 04/13/24 0931          Hold Oral hypoglycemics while patient is in the hospital.  POCT glucose q6h

## 2024-05-07 NOTE — PLAN OF CARE
Problem: Infection  Goal: Absence of Infection Signs and Symptoms  Outcome: Progressing     Problem: Skin Injury Risk Increased  Goal: Skin Health and Integrity  Outcome: Progressing     Problem: Adult Inpatient Plan of Care  Goal: Plan of Care Review  Outcome: Progressing  Goal: Patient-Specific Goal (Individualized)  Outcome: Progressing  Goal: Absence of Hospital-Acquired Illness or Injury  Outcome: Progressing  Goal: Optimal Comfort and Wellbeing  Outcome: Progressing  Goal: Readiness for Transition of Care  Outcome: Progressing     Problem: Bariatric Environmental Safety  Goal: Safety Maintained with Care  Outcome: Progressing     Problem: Device-Related Complication Risk (Hemodialysis)  Goal: Safe, Effective Therapy Delivery  Outcome: Progressing     Problem: Hemodynamic Instability (Hemodialysis)  Goal: Effective Tissue Perfusion  Outcome: Progressing     Problem: Infection (Hemodialysis)  Goal: Absence of Infection Signs and Symptoms  Outcome: Progressing     Problem: Diabetes Comorbidity  Goal: Blood Glucose Level Within Targeted Range  Outcome: Progressing     Problem: Adjustment to Illness (Sepsis/Septic Shock)  Goal: Optimal Coping  Outcome: Progressing     Problem: Glycemic Control Impaired (Sepsis/Septic Shock)  Goal: Blood Glucose Level Within Desired Range  Outcome: Progressing     Problem: Infection Progression (Sepsis/Septic Shock)  Goal: Absence of Infection Signs and Symptoms  Outcome: Progressing     Problem: Nutrition Impaired (Sepsis/Septic Shock)  Goal: Optimal Nutrition Intake  Outcome: Progressing     Problem: Fall Injury Risk  Goal: Absence of Fall and Fall-Related Injury  Outcome: Progressing     Problem: Adjustment to Illness (Chronic Kidney Disease)  Goal: Optimal Coping with Chronic Illness  Outcome: Progressing  Goal: Electrolyte Balance  Outcome: Progressing  Goal: Fluid Balance  Outcome: Progressing     Problem: Electrolyte Imbalance (Chronic Kidney Disease)  Goal: Electrolyte  Balance  Outcome: Progressing     Problem: Fluid Volume Excess (Chronic Kidney Disease)  Goal: Fluid Balance  Outcome: Progressing     Problem: Functional Decline (Chronic Kidney Disease)  Goal: Optimal Functional Ability  Outcome: Progressing     Problem: Hematologic Alteration (Chronic Kidney Disease)  Goal: Absence of Anemia Signs and Symptoms  Outcome: Progressing     Problem: Oral Intake Inadequate (Chronic Kidney Disease)  Goal: Optimal Oral Intake  Outcome: Progressing     Problem: Pain (Chronic Kidney Disease)  Goal: Acceptable Pain Control  Outcome: Progressing     Problem: Renal Function Impairment (Chronic Kidney Disease)  Goal: Minimize Renal Failure Effects  Outcome: Progressing     Problem: Restraint, Nonviolent  Goal: Absence of Harm or Injury  Outcome: Progressing     Problem: Wound  Goal: Optimal Coping  Outcome: Progressing  Goal: Optimal Functional Ability  Outcome: Progressing  Goal: Absence of Infection Signs and Symptoms  Outcome: Progressing  Goal: Improved Oral Intake  Outcome: Progressing  Goal: Optimal Pain Control and Function  Outcome: Progressing  Goal: Skin Health and Integrity  Outcome: Progressing  Goal: Optimal Wound Healing  Outcome: Progressing    Pt remained free of falls or injuries during shift. Pt remains in bed with the call light in reach and the bed alarm on.

## 2024-05-07 NOTE — PROGRESS NOTES
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 History was given from patient's daughter. She was undergoing dialysis today and noticed she was lethargic, less alert than usual self. Pt completed dialysis and still not acting herself. EMS was called, fever of a 100.  On chart review, she did have an episode of large volume emesis around 1700.  Per EMS, she had a slightly elevated temp 100.0°F, glucose 300s.     In the ED: UA 2+ leuks, >100 WBC, many bacteria, WBC 17, CT abd/pelvis concerning for cystitis. Given vanc/zosyn    Overview/Hospital Course:  Patient was admitted to Hospital Medicine service for medical management and evaluation of urosepsis. Patient was continued on vanc/zosyn. Vascular Neurology consulted concerning mental status change with the recommendation to initiate ASA 81 QD and to obtain an MRI to r/o new stroke. Imaging pending. Nephrology was consulted for regularly scheduled dialysis. Afternoon of 4/12, patient was unable to tolerate filtration of volume during dialsis 2/2 hypotension. Patient received 500cc bolus and was transferred back to floor after finishing the session without volume removed. Will monitor for signs of volume overload. Tailoring insulin regimen while uptitrating tube feeds to goal rate. Staph Epi in all 4 bottles; ID with recommendation to continue Vanc & de-escalate meropenem to ertapenem on 04/15 through 4/16. Patient completed IV course of UTI coverage. Patient tolerated volume removal well in dialysis  throughout the rest of her hospital stay. Pending discharge to LTACH pending bed availability. Patient without BM with one episode of vomiting overnight 4/17. KUB with bowel gas and low concern for obstruction with no transition point noted. Escalating bowel regimen. Pending placement as of 4/22. Pulm consult placed to evaluate for possible trach downsize & eventual decannulation to assist placement if safe. Trach capping trial per pulm for 48h and will assess for decannulation on Monday. DVT studies negative. Pulm successfully decannulated pt 4/30, IR exchanged TDC. Pending swallow study with SLP and waiting for dispo options. Pt failed swallow study, placement pending. Working with PT on mobilize pt to sitting in a chair to expand placement options    Interval History: No acute events overnight, afebrile, hemodynamically stable. Pending placement.     Review of Systems   Unable to perform ROS: Patient nonverbal     Objective:     Vital Signs (Most Recent):  Temp: 98.7 °F (37.1 °C) (05/07/24 0756)  Pulse: 90 (05/07/24 1000)  Resp: 18 (05/07/24 0756)  BP: 124/77 (05/07/24 1000)  SpO2: 98 % (05/07/24 0756) Vital Signs (24h Range):  Temp:  [97.7 °F (36.5 °C)-99.6 °F (37.6 °C)] 98.7 °F (37.1 °C)  Pulse:  [] 90  Resp:  [16-19] 18  SpO2:  [98 %-100 %] 98 %  BP: (110-149)/(70-84) 124/77     Weight: 122.5 kg (270 lb 1 oz)  Body mass index is 42.3 kg/m².    Intake/Output Summary (Last 24 hours) at 5/7/2024 1150  Last data filed at 5/6/2024 1843  Gross per 24 hour   Intake 1690.91 ml   Output 2950 ml   Net -1259.09 ml         Physical Exam  Vitals and nursing note reviewed.   HENT:      Head: Normocephalic and atraumatic.   Cardiovascular:      Rate and Rhythm: Normal rate and regular rhythm.      Heart sounds: Normal heart sounds.   Pulmonary:      Effort: Pulmonary effort is normal. No respiratory distress.      Breath sounds: Normal breath sounds.   Abdominal:      General: Bowel sounds are normal. There is no  distension.      Palpations: Abdomen is soft.      Tenderness: There is no abdominal tenderness. There is no guarding or rebound.      Comments: PEG tube   Musculoskeletal:      Right lower leg: No edema.      Left lower leg: No edema.      Comments: Moves Upper extremities spontaneously   Skin:     General: Skin is warm and dry.   Neurological:      General: No focal deficit present.      Mental Status: She is alert.      Comments: Nonverbal, not following commands           Significant Labs: All pertinent labs within the past 24 hours have been reviewed.  LABS:  Recent Labs   Lab 05/03/24  0544 05/06/24  0219 05/06/24  1703   * 126* 129*   K 3.7 3.6 3.7   CL 93* 89* 92*   CO2 21* 21* 24   BUN 44* 64* 28*   CREATININE 5.2* 6.4* 3.6*   * 107 146*   ANIONGAP 14 16 13     Recent Labs   Lab 05/01/24  0641 05/03/24  0544 05/06/24  0219   MG 2.3  --   --    PHOS 5.4* 3.8 4.3     Recent Labs   Lab 05/01/24  0641 05/03/24  0544 05/06/24  0219   ALBUMIN 3.1* 3.0* 3.2*     POCT Glucose:   Recent Labs   Lab 05/06/24  1804 05/06/24  2305 05/07/24  0641   POCTGLUCOSE 159* 146* 162*    Recent Labs   Lab 05/07/24  1028   WBC 9.61   HGB 10.5*   HCT 34.3*      GRAN 54.7  5.3            Significant Imaging: I have reviewed all pertinent imaging results/findings within the past 24 hours.      Inpatient Medications:  Continuous Infusions:   dextrose 10 % in water (D10W)   Intravenous Continuous PRN         Scheduled Meds:   [START ON 5/8/2024] sodium chloride 0.9%   Intravenous Once    ascorbic acid (vitamin C)  500 mg Per G Tube BID    aspirin  81 mg Per G Tube Daily    atorvastatin  40 mg Per G Tube Daily    epoetin merry (PROCRIT) injection  50 Units/kg Intravenous Every Mon, Wed, Fri    heparin (porcine)  7,500 Units Subcutaneous Q8H    insulin aspart U-100  4 Units Subcutaneous Q6H    insulin detemir U-100 (Levemir)  27 Units Subcutaneous BID    metoprolol tartrate  25 mg Per G Tube BID    pantoprazole  40 mg  Per G Tube Daily    polyethylene glycol  17 g Per G Tube BID    psyllium husk (aspartame)  1 packet Per G Tube Daily    senna-docusate 8.6-50 mg  1 tablet Per G Tube BID    sevelamer carbonate  0.8 g Per G Tube TID    zinc sulfate  220 mg Per G Tube Daily     PRN Meds:  Current Facility-Administered Medications:     dextrose 10 % in water (D10W), , Intravenous, Continuous PRN    dextrose 10%, 12.5 g, Intravenous, PRN    dextrose 10%, 25 g, Intravenous, PRN    glucagon (human recombinant), 1 mg, Intramuscular, PRN    glucose, 16 g, Oral, PRN    glucose, 24 g, Oral, PRN    heparin (porcine), 1,000 Units, Intra-Catheter, PRN    heparin (porcine), 3,000 Units, Intravenous, PRN    insulin aspart U-100, 0-10 Units, Subcutaneous, Q6H PRN    naloxone, 0.02 mg, Intravenous, PRN    ondansetron, 4 mg, Intravenous, Q6H PRN    sodium chloride 0.9%, 10 mL, Intravenous, Q12H PRN;    Assessment/Plan:      * Acute cystitis with hematuria  resolved    Pt presenting with AMS and worsening lethargy. Pt is non-verbal at baseline, does not follow commands, but was less responsive than normal. Pt found to have a fever. Urinary source suspected based off of urine and CT abd/pelvis. Pt has many prior resistant bacterial infections including urinary sources. Has prior with sensitivity to zosyn and has also improved off ED dose of vanc/zosyn. Lactic elevated a 3.8->3.49 prior to completion of fluid bolus 500ml.    Patient with new fever and tachycardia on 4/25. Low threshold to initiate additional infectious workup and repeat UA.     Plan  S/p course of vanc/ertapenem per ID. Course completed 4/16.  Daily cbc  Contact precautions    *on contact precautions indefinitely while patient still makes urine given pt incontinent per infection control    Complication of vascular dialysis catheter  Cath intermittently clogging, not resolving with cath-hedy, going for IR venogram 4/30 with possible exchange. S/p exchange with IR on  4/30      Constipation  RESOLVED with Bowel regimen  Patient without BM x5d. One episode of vomitus night of 4/17. Some concern for bowel obstruction. Tolerating continuous tube feeds at goal rate with 0cc on residuals. Physical exam with bowel sounds, soft nontender abdomen. KUB without concern for obstruction with bowel gas, lack of transition point.    Patient now with regular bowel movements on bowel regimen.     Plan  - Miralax BID, Senna BID  - compazine PRN    Moderate malnutrition  Nutrition consulted. Most recent weight and BMI monitored-     Measurements:  Wt Readings from Last 1 Encounters:   05/01/24 122.5 kg (270 lb 1 oz)   Body mass index is 42.3 kg/m².    Patient has been screened and assessed by RD.    Malnutrition Type:  Context: acute illness or injury  Level: moderate    Malnutrition Characteristic Summary:  Weight Loss (Malnutrition): 10% in 6 months  Subcutaneous Fat (Malnutrition): mild depletion  Muscle Mass (Malnutrition): mild depletion  Fluid Accumulation (Malnutrition): mild    Interventions/Recommendations (treatment strategy):  1.    -- TF  -- going for swallow study with SLP to assess possibility of pleasure oral feedings    Prolonged QT interval  Qtc 526, limit Qtc prolonging drugs as able      Spastic hemiplegia of right dominant side as late effect of cerebrovascular disease   This patient has Chronic right hemiplegia due to stroke. Physical therapy services has not been scheduled. Continue all standard measures for pressure injury prevention and consult wound care for any wounds (chronic or acute).    ESRD (end stage renal disease) on dialysis  Creatine stable for now. BMP reviewed- noted Estimated Creatinine Clearance: 13.8 mL/min (A) (based on SCr of 6.4 mg/dL (H)). according to latest data. Based on current GFR, CKD stage is end stage.  Monitor UOP and serial BMP and adjust therapy as needed. Renally dose meds. Avoid nephrotoxic medications and procedures.    -- HD MWF  -- nephro  following  -- sevelamer TID    Status post tracheostomy  Resolved, decannulated 4/30    Acute encephalopathy  Pt is non-verbal and does not follow commands at baseline. Vascular Neurology consulted given acute change from baseline. MRI with concern for evolution of prior strokes with noted differential of T2 hyperintensities including vasculitis vs demyelination. Discussed with Vascular Neurology; low concern for ongoing vasculitis/demyelination, likely represents typical evolution of prior infarcts.    Patient at baseline as of 4/22.     Plan  - STAT head imaging for concern of new change in mental status/focal deficit    Type 2 diabetes mellitus with hyperglycemia, with long-term current use of insulin  Patient's FSGs are controlled on current medication regimen.  Last A1c reviewed-   Lab Results   Component Value Date    HGBA1C 10.4 (H) 01/30/2024     Most recent fingerstick glucose reviewed-   Recent Labs   Lab 05/06/24  1208 05/06/24  1804 05/06/24  2305 05/07/24  0641   POCTGLUCOSE 108 159* 146* 162*     Current correctional scale  Medium  Maintain anti-hyperglycemic dose as follows-   Antihyperglycemics (From admission, onward)      Start     Stop Route Frequency Ordered    04/17/24 1200  insulin aspart U-100 pen 4 Units         -- SubQ Every 6 hours 04/17/24 0938    04/17/24 0944  insulin aspart U-100 pen 0-10 Units         -- SubQ Every 6 hours PRN 04/17/24 0938    04/13/24 2100  insulin detemir U-100 (Levemir) pen 27 Units         -- SubQ 2 times daily 04/13/24 0931          Hold Oral hypoglycemics while patient is in the hospital.  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 Pt underwent extensive course, currently still healing from this, but no longer has wound vac. Pt's wound currently covered with bandage. Picture in media tabs    Plan  Wound care        VTE Risk Mitigation (From admission, onward)           Ordered     heparin  (porcine) injection 3,000 Units  As needed (PRN)         04/17/24 0825     heparin (porcine) injection 1,000 Units  As needed (PRN)         04/12/24 0951     heparin (porcine) injection 7,500 Units  Every 8 hours         04/11/24 0252                    Discharge Planning   ZEKE: 5/10/2024     Code Status: Full Code   Is the patient medically ready for discharge?: No    Reason for patient still in hospital (select all that apply): PT / OT recommendations and Pending disposition  Discharge Plan A: Skilled Nursing Facility              Art Glynn DO  Department of Hospital Medicine   [facility] - Telemetry Stepdown

## 2024-05-07 NOTE — ASSESSMENT & PLAN NOTE
RESOLVED with Bowel regimen  Patient without BM x5d. One episode of vomitus night of 4/17. Some concern for bowel obstruction. Tolerating continuous tube feeds at goal rate with 0cc on residuals. Physical exam with bowel sounds, soft nontender abdomen. KUB without concern for obstruction with bowel gas, lack of transition point.    Patient now with regular bowel movements on bowel regimen.     Plan  - Miralax BID, Senna BID  - compazine PRN

## 2024-05-08 LAB
ALBUMIN SERPL BCP-MCNC: 3.2 G/DL (ref 3.5–5.2)
ANION GAP SERPL CALC-SCNC: 16 MMOL/L (ref 8–16)
BUN SERPL-MCNC: 50 MG/DL (ref 6–20)
CALCIUM SERPL-MCNC: 10.3 MG/DL (ref 8.7–10.5)
CHLORIDE SERPL-SCNC: 91 MMOL/L (ref 95–110)
CO2 SERPL-SCNC: 20 MMOL/L (ref 23–29)
CREAT SERPL-MCNC: 6 MG/DL (ref 0.5–1.4)
EST. GFR  (NO RACE VARIABLE): 7.8 ML/MIN/1.73 M^2
GLUCOSE SERPL-MCNC: 148 MG/DL (ref 70–110)
HBV SURFACE AG SERPL QL IA: NORMAL
PHOSPHATE SERPL-MCNC: 4.4 MG/DL (ref 2.7–4.5)
POCT GLUCOSE: 107 MG/DL (ref 70–110)
POCT GLUCOSE: 111 MG/DL (ref 70–110)
POCT GLUCOSE: 133 MG/DL (ref 70–110)
POCT GLUCOSE: 153 MG/DL (ref 70–110)
POCT GLUCOSE: 172 MG/DL (ref 70–110)
POTASSIUM SERPL-SCNC: 4 MMOL/L (ref 3.5–5.1)
SODIUM SERPL-SCNC: 127 MMOL/L (ref 136–145)

## 2024-05-08 PROCEDURE — 80100016 HC MAINTENANCE HEMODIALYSIS

## 2024-05-08 PROCEDURE — 97530 THERAPEUTIC ACTIVITIES: CPT | Mod: CQ

## 2024-05-08 PROCEDURE — 80069 RENAL FUNCTION PANEL: CPT

## 2024-05-08 PROCEDURE — 63600175 PHARM REV CODE 636 W HCPCS: Performed by: NURSE PRACTITIONER

## 2024-05-08 PROCEDURE — 25000003 PHARM REV CODE 250

## 2024-05-08 PROCEDURE — 92507 TX SP LANG VOICE COMM INDIV: CPT

## 2024-05-08 PROCEDURE — 80100014 HC HEMODIALYSIS 1:1

## 2024-05-08 PROCEDURE — 87340 HEPATITIS B SURFACE AG IA: CPT | Performed by: NURSE PRACTITIONER

## 2024-05-08 PROCEDURE — 36415 COLL VENOUS BLD VENIPUNCTURE: CPT

## 2024-05-08 PROCEDURE — 25000003 PHARM REV CODE 250: Performed by: STUDENT IN AN ORGANIZED HEALTH CARE EDUCATION/TRAINING PROGRAM

## 2024-05-08 PROCEDURE — 20600001 HC STEP DOWN PRIVATE ROOM

## 2024-05-08 PROCEDURE — 25000003 PHARM REV CODE 250: Performed by: NURSE PRACTITIONER

## 2024-05-08 PROCEDURE — 97110 THERAPEUTIC EXERCISES: CPT | Mod: CQ

## 2024-05-08 PROCEDURE — 92526 ORAL FUNCTION THERAPY: CPT

## 2024-05-08 PROCEDURE — 27000207 HC ISOLATION

## 2024-05-08 PROCEDURE — 63600175 PHARM REV CODE 636 W HCPCS

## 2024-05-08 PROCEDURE — 63600175 PHARM REV CODE 636 W HCPCS: Mod: JZ | Performed by: NURSE PRACTITIONER

## 2024-05-08 PROCEDURE — 90935 HEMODIALYSIS ONE EVALUATION: CPT | Mod: ,,, | Performed by: NURSE PRACTITIONER

## 2024-05-08 RX ADMIN — METOPROLOL TARTRATE 25 MG: 25 TABLET, FILM COATED ORAL at 01:05

## 2024-05-08 RX ADMIN — HEPARIN SODIUM 7500 UNITS: 5000 INJECTION INTRAVENOUS; SUBCUTANEOUS at 09:05

## 2024-05-08 RX ADMIN — INSULIN DETEMIR 27 UNITS: 100 INJECTION, SOLUTION SUBCUTANEOUS at 09:05

## 2024-05-08 RX ADMIN — ZINC SULFATE 220 MG (50 MG) CAPSULE 220 MG: CAPSULE at 01:05

## 2024-05-08 RX ADMIN — Medication 500 MG: at 09:05

## 2024-05-08 RX ADMIN — INSULIN ASPART 1 UNITS: 100 INJECTION, SOLUTION INTRAVENOUS; SUBCUTANEOUS at 06:05

## 2024-05-08 RX ADMIN — PANTOPRAZOLE SODIUM 40 MG: 40 GRANULE, DELAYED RELEASE ORAL at 01:05

## 2024-05-08 RX ADMIN — METOPROLOL TARTRATE 25 MG: 25 TABLET, FILM COATED ORAL at 09:05

## 2024-05-08 RX ADMIN — HEPARIN SODIUM 7500 UNITS: 5000 INJECTION INTRAVENOUS; SUBCUTANEOUS at 06:05

## 2024-05-08 RX ADMIN — ASPIRIN 81 MG CHEWABLE TABLET 81 MG: 81 TABLET CHEWABLE at 01:05

## 2024-05-08 RX ADMIN — INSULIN ASPART 4 UNITS: 100 INJECTION, SOLUTION INTRAVENOUS; SUBCUTANEOUS at 06:05

## 2024-05-08 RX ADMIN — DIPHENHYDRAMINE HYDROCHLORIDE, ZINC ACETATE: 2; .1 CREAM TOPICAL at 06:05

## 2024-05-08 RX ADMIN — SODIUM CHLORIDE: 0.9 INJECTION, SOLUTION INTRAVENOUS at 12:05

## 2024-05-08 RX ADMIN — INSULIN DETEMIR 27 UNITS: 100 INJECTION, SOLUTION SUBCUTANEOUS at 02:05

## 2024-05-08 RX ADMIN — Medication 500 MG: at 01:05

## 2024-05-08 RX ADMIN — HEPARIN SODIUM 1000 UNITS: 1000 INJECTION, SOLUTION INTRAVENOUS; SUBCUTANEOUS at 11:05

## 2024-05-08 RX ADMIN — HEPARIN SODIUM 7500 UNITS: 5000 INJECTION INTRAVENOUS; SUBCUTANEOUS at 01:05

## 2024-05-08 RX ADMIN — ERYTHROPOIETIN 6100 UNITS: 10000 INJECTION, SOLUTION INTRAVENOUS; SUBCUTANEOUS at 09:05

## 2024-05-08 RX ADMIN — SEVELAMER CARBONATE 0.8 G: 2400 POWDER, FOR SUSPENSION ORAL at 02:05

## 2024-05-08 RX ADMIN — SEVELAMER CARBONATE 0.8 G: 2400 POWDER, FOR SUSPENSION ORAL at 09:05

## 2024-05-08 RX ADMIN — INSULIN ASPART 4 UNITS: 100 INJECTION, SOLUTION INTRAVENOUS; SUBCUTANEOUS at 01:05

## 2024-05-08 RX ADMIN — ATORVASTATIN CALCIUM 40 MG: 40 TABLET, FILM COATED ORAL at 01:05

## 2024-05-08 RX ADMIN — INSULIN ASPART 4 UNITS: 100 INJECTION, SOLUTION INTRAVENOUS; SUBCUTANEOUS at 04:05

## 2024-05-08 RX ADMIN — HEPARIN SODIUM 3000 UNITS: 1000 INJECTION, SOLUTION INTRAVENOUS; SUBCUTANEOUS at 07:05

## 2024-05-08 NOTE — PT/OT/SLP PROGRESS
"Speech Language Pathology Treatment    Patient Name:  Sarah Saravia   MRN:  [id number]  Admitting Diagnosis: Acute cystitis with hematuria    Recommendations:                 General Recommendations:  Dysphagia therapy and Speech/language therapy  Diet recommendations:  NPO, Liquid Diet Level: NPO   Aspiration Precautions: Continue alternate means of nutrition, Frequent oral care, and Strict aspiration precautions   General Precautions: Standard, aspiration, fall, aphasia, contact, NPO  Communication strategies:  go to room if call light pushed ; pt with severe cognitive-communicative deficits.     Assessment:     Sarah Saravia is a 53 y.o. female with an SLP diagnosis of Aphasia, Dysphagia, and Cognitive-Linguistic Impairment.      Subjective     "Yeah"    Pain/Comfort:  Pain Rating 1:  (no indications of pain)    Respiratory Status: Room air    Objective:     Has the patient been evaluated by SLP for swallowing?   Yes  Keep patient NPO? Yes   Current Respiratory Status:        Pt seen for ongoing swallowing assessment and aphasia tx. Pt with spontaneous cough prior to PO trials. After repositioning to upright position, pt accepted trials of an ice chip x 1 and thin water via 1/2 tsp x 2 and full tsp x 1.  Delayed swallow response and delayed cough present for ice chip trial.  Delayed cough also present after full tsp thin water. No further PO trials were given due to concerns for aspiration.  Strict NPO continues to be recommended. Pt answered a few simple informal yes/no q's either verbally (yeah x 1), head nod, or with an affirmative vocalization. However, pt did not respond to formal simple yes/no q's.  Pt did not follow 1-step commands despite max cues. Pt did not state or repeat name. She did not attempt to vocalize/verbalize during automatic singing or counting tasks.  Cont POC.     Goals:   Multidisciplinary Problems       SLP Goals          Problem: SLP    Goal Priority Disciplines Outcome   SLP Goal    "  SLP    Description: Speech Language Pathology Goals  Updated goals expected to be met by 5/10:  1. Pt will participate in ongoing swallowing assessment to determine if appropriate for PO trials for pleasure.   2. Pt will model single step command x 1 given max cues.   3. Pt will answer simple yes/no q's during a structured receptive language task x 1 given max cues.   4. Pt will phonate purposefully upon command x 1 given max cues.   5. Pt will attempt to vocalize/verbalize during automatic speech task x 1 given max cues.   6. Pt will participate in evaluation of ability to utilize basic communication board.     Goals expected to be met by 5/8:  1. Pt will participate in Modified Barium Swallow Study to determine if safe for oral intake. Goal met/attempted 5/2                               Plan:     Patient to be seen:  3 x/week   Plan of Care expires:  05/31/24  Plan of Care reviewed with:  patient   SLP Follow-Up:  Yes       Discharge recommendations:  Moderate Intensity Therapy     Time Tracking:     SLP Treatment Date:   05/08/24  Speech Start Time:  1229  Speech Stop Time:  1245     Speech Total Time (min):  16 min    Billable Minutes: Speech Therapy Individual 8 and Treatment Swallowing Dysfunction 8    05/08/2024

## 2024-05-08 NOTE — NURSING
Patient returns to unit per bed in no apparent distress with transport x2. Patient settled in and PEG tube flushed. Therapy in room at this time.

## 2024-05-08 NOTE — PROGRESS NOTES
[facility] - Telemetry Stepdown  Adult Nutrition  Progress Note    SUMMARY       Recommendations    Continue TF regimen of Novasource @ 40 mL/hr - meeting needs.  - If bolus TFs warranted for discharge, rec'd Novasource - 4 cans/day = 1900 kcals, 88 g of protein, 672 mL fluid.   RD to monitor & follow-up.    Goals: Meet % EEN, EPN by RD f/u date  Nutrition Goal Status: goal met  Communication of RD Recs: reviewed with RN    Assessment and Plan    Moderate malnutrition    Malnutrition Type:  Context: acute illness or injury  Level: moderate    Related to (etiology):   Inability to consume sufficient energy     Signs and Symptoms (as evidenced by):   Weight loss, NFPE, Edema    Malnutrition Characteristic Summary:  Weight Loss (Malnutrition): 10% in 6 months  Subcutaneous Fat (Malnutrition): mild depletion  Muscle Mass (Malnutrition): mild depletion  Fluid Accumulation (Malnutrition): mild    Interventions/Recommendations (treatment strategy):  Collaboration of nutrition care w/ other providers  EN    Nutrition Diagnosis Status:   Continues     Malnutrition Assessment    Malnutrition Context: acute illness or injury  Malnutrition Level: moderate    Weight Loss (Malnutrition): 10% in 6 months  Subcutaneous Fat (Malnutrition): mild depletion  Muscle Mass (Malnutrition): mild depletion  Fluid Accumulation (Malnutrition): mild     Reason for Assessment    Reason For Assessment: RD follow-up  Diagnosis: other (see comments) (Acute cystitis with hematuria)  Relevant Medical History: CVA, PEG, DM  Interdisciplinary Rounds: did not attend    General Information Comments: [facility] for HD this AM. Per RN documentation, pt tolerating TFs via PEG. Remains NPO - per SLP. Moderate malnutrition diagnosis continues; please see PES statement for details.   Nutrition Discharge Planning: Adequate nutrition    Nutrition Risk Screen    Nutrition Risk Screen: no indicators present    Nutrition/Diet History    Spiritual, Cultural Beliefs,  "Restoration Practices, Values that Affect Care: no  Food Allergies: NKFA  Factors Affecting Nutritional Intake: NPO    Anthropometrics    Temp: 98 °F (36.7 °C)  Height Method: Stated  Height: 5' 7" (170.2 cm)  Height (inches): 67 in  Weight Method: Bed Scale  Weight: 122.5 kg (270 lb 1 oz)  Weight (lb): 270.07 lb  Ideal Body Weight (IBW), Female: 135 lb  % Ideal Body Weight, Female (lb): 200.05 %  BMI (Calculated): 42.3  BMI Grade: greater than 40 - morbid obesity  Usual Body Weight (UBW), kg: (!) 136.4 kg  % Usual Body Weight: 90  % Weight Change From Usual Weight: -10.19 %    Lab/Procedures/Meds    Pertinent Labs Reviewed: reviewed  Pertinent Labs Comments: Creat 6, GFR 7.8  Pertinent Medications Reviewed: reviewed  Pertinent Medications Comments: -    Estimated/Assessed Needs    Weight Used For Calorie Calculations: 122.5 kg (270 lb 1 oz)    Energy Calorie Requirements (kcal): 2048 kcal/d  Energy Need Method: [place]-St Jeor (1.1 PAL)    Protein Requirements:  g/d (.8-1 g/kg)  Weight Used For Protein Calculations: 122.5 kg (270 lb 1 oz)    Estimated Fluid Requirement Method: other (see comments) (Per MD)  RDA Method (mL): 2048    CHO Requirement: 256g    Nutrition Prescription Ordered    Current Diet Order: NPO  Current Nutrition Support Formula Ordered: Novasource Renal  Current Nutrition Support Rate Ordered: 40 mL/hr    Evaluation of Received Nutrient/Fluid Intake    Enteral Calories (kcal): 1920  Enteral Protein (gm): 87  Enteral (Free Water) Fluid (mL): 688  Free Water Flush Fluid (mL): 1200    % Kcal Needs: 94%  % Protein Needs: 90%    I/O: -2L since 4/24    Energy Calories Required: meeting needs  Protein Required: meeting needs  Fluid Required: other (see comments) (Per MD)    Comments: LBM: 5/7    Tolerance: tolerating    Nutrition Risk    Level of Risk/Frequency of Follow-up:  (1x/week)     Monitor and Evaluation    Food and Nutrient Intake: enteral nutrition intake  Food and Nutrient Adminstration: " enteral and parenteral nutrition administration  Physical Activity and Function: nutrition-related ADLs and IADLs  Anthropometric Measurements: weight, weight change  Biochemical Data, Medical Tests and Procedures: glucose/endocrine profile, lipid profile, inflammatory profile, gastrointestinal profile  Nutrition-Focused Physical Findings: overall appearance     Nutrition Follow-Up    RD Follow-up?: Yes

## 2024-05-08 NOTE — PLAN OF CARE
[facility] - Telemetry Stepdown  Discharge Reassessment    Primary Care Provider: No, Primary Doctor    Expected Discharge Date: 5/13/2024    Reassessment (most recent)       Discharge Reassessment - 05/08/24 1457          Discharge Reassessment    Assessment Type Discharge Planning Reassessment     Did the patient's condition or plan change since previous assessment? No     Discharge Plan A New Nursing Home placement - [facility]     Discharge Plan B Home with family     DME Needed Upon Discharge  other (see comments)   TBD    Why the patient remains in the hospital Placement issues        Post-Acute Status    Post-Acute Authorization Placement     Post-Acute Placement Status Referrals Sent                   Discharge Plan A and Plan B have been determined by review of patient's clinical status, future medical and therapeutic needs, and coverage/benefits for post-acute care in coordination with multidisciplinary team members.     Sara Loredo RN  Ext [phone]

## 2024-05-08 NOTE — ASSESSMENT & PLAN NOTE
Nutrition consulted. Most recent weight and BMI monitored-     Measurements:  Wt Readings from Last 1 Encounters:   05/08/24 122.5 kg (270 lb 1 oz)   Body mass index is 42.3 kg/m².    Patient has been screened and assessed by RD.    Malnutrition Type:  Context: acute illness or injury  Level: moderate    Malnutrition Characteristic Summary:  Weight Loss (Malnutrition): 10% in 6 months  Subcutaneous Fat (Malnutrition): mild depletion  Muscle Mass (Malnutrition): mild depletion  Fluid Accumulation (Malnutrition): mild    Interventions/Recommendations (treatment strategy):  1.    -- TF  -- going for swallow study with SLP to assess possibility of pleasure oral feedings

## 2024-05-08 NOTE — PROGRESS NOTES
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 History was given from patient's daughter. She was undergoing dialysis today and noticed she was lethargic, less alert than usual self. Pt completed dialysis and still not acting herself. EMS was called, fever of a 100.  On chart review, she did have an episode of large volume emesis around 1700.  Per EMS, she had a slightly elevated temp 100.0°F, glucose 300s.     In the ED: UA 2+ leuks, >100 WBC, many bacteria, WBC 17, CT abd/pelvis concerning for cystitis. Given vanc/zosyn    Overview/Hospital Course:  Patient was admitted to Hospital Medicine service for medical management and evaluation of urosepsis. Patient was continued on vanc/zosyn. Vascular Neurology consulted concerning mental status change with the recommendation to initiate ASA 81 QD and to obtain an MRI to r/o new stroke. Imaging pending. Nephrology was consulted for regularly scheduled dialysis. Afternoon of 4/12, patient was unable to tolerate filtration of volume during dialsis 2/2 hypotension. Patient received 500cc bolus and was transferred back to floor after finishing the session without volume removed. Will monitor for signs of volume overload. Tailoring insulin regimen while uptitrating tube feeds to goal rate. Staph Epi in all 4 bottles; ID with recommendation to continue Vanc & de-escalate meropenem to ertapenem on 04/15 through 4/16. Patient completed IV course of UTI coverage. Patient tolerated volume removal well in dialysis  throughout the rest of her hospital stay. Pending discharge to LTACH pending bed availability. Patient without BM with one episode of vomiting overnight 4/17. KUB with bowel gas and low concern for obstruction with no transition point noted. Escalating bowel regimen. Pending placement as of 4/22. Pulm consult placed to evaluate for possible trach downsize & eventual decannulation to assist placement if safe. Trach capping trial per pulm for 48h and will assess for decannulation on Monday. DVT studies negative. Pulm successfully decannulated pt 4/30, IR exchanged TDC. Pending swallow study with SLP and waiting for dispo options. Pt failed swallow study, placement pending. Working with PT on mobilize pt to sitting in a chair to expand placement options    Interval History: NAEON. It is important that pt is able to SIT in chair for 4h for dialysis placement needs, even if assisted into chair would greatly help placement      Objective:     Vital Signs (Most Recent):  Temp: 98 °F (36.7 °C) (05/08/24 1125)  Pulse: 104 (05/08/24 1125)  Resp: 18 (05/08/24 1125)  BP: 108/72 (05/08/24 1125)  SpO2: 100 % (05/08/24 1125) Vital Signs (24h Range):  Temp:  [97.8 °F (36.6 °C)-98.8 °F (37.1 °C)] 98 °F (36.7 °C)  Pulse:  [] 104  Resp:  [18-20] 18  SpO2:  [95 %-100 %] 100 %  BP: (102-128)/(67-86) 108/72     Weight: 122.5 kg (270 lb 1 oz)  Body mass index is 42.3 kg/m².    Intake/Output Summary (Last 24 hours) at 5/8/2024 1313  Last data filed at 5/8/2024 1125  Gross per 24 hour   Intake --   Output 2150 ml   Net -2150 ml         Physical Exam  Vitals and nursing note reviewed.   HENT:      Head: Normocephalic and atraumatic.   Neck:      Comments: Trach removed  Cardiovascular:      Rate and Rhythm: Normal rate and regular rhythm.      Heart sounds: Normal heart sounds.   Pulmonary:      Effort: Pulmonary effort is normal. No respiratory distress.      Breath sounds: Normal breath sounds.   Abdominal:      General: Abdomen is  flat. There is no distension.      Palpations: Abdomen is soft.      Tenderness: There is no abdominal tenderness.      Comments: PEG tube   Musculoskeletal:      Right lower leg: No edema.      Left lower leg: No edema.      Comments: Moves UEs spontaneously   Skin:     General: Skin is warm and dry.   Neurological:      General: No focal deficit present.      Mental Status: She is alert.      Comments: Nonverbal, not following commands             Significant Labs: All pertinent labs within the past 24 hours have been reviewed.    Significant Imaging: I have reviewed all pertinent imaging results/findings within the past 24 hours.    Assessment/Plan:      * Acute cystitis with hematuria  resolved    Pt presenting with AMS and worsening lethargy. Pt is non-verbal at baseline, does not follow commands, but was less responsive than normal. Pt found to have a fever. Urinary source suspected based off of urine and CT abd/pelvis. Pt has many prior resistant bacterial infections including urinary sources. Has prior with sensitivity to zosyn and has also improved off ED dose of vanc/zosyn. Lactic elevated a 3.8->3.49 prior to completion of fluid bolus 500ml.    Patient with new fever and tachycardia on 4/25. Low threshold to initiate additional infectious workup and repeat UA.     Plan  S/p course of vanc/ertapenem per ID. Course completed 4/16.  Daily cbc  Contact precautions    *on contact precautions indefinitely while patient still makes urine given pt incontinent per infection control    Complication of vascular dialysis catheter  Cath intermittently clogging, not resolving with cath-hedy, going for IR venogram 4/30 with possible exchange. S/p exchange with IR on 4/30      Constipation  RESOLVED with Bowel regimen  Patient without BM x5d. One episode of vomitus night of 4/17. Some concern for bowel obstruction. Tolerating continuous tube feeds at goal rate with 0cc on residuals. Physical exam with bowel sounds, soft  nontender abdomen. KUB without concern for obstruction with bowel gas, lack of transition point.    Patient now with regular bowel movements on bowel regimen.     Plan  - Miralax BID, Senna BID  - compazine PRN    Moderate malnutrition  Nutrition consulted. Most recent weight and BMI monitored-     Measurements:  Wt Readings from Last 1 Encounters:   05/08/24 122.5 kg (270 lb 1 oz)   Body mass index is 42.3 kg/m².    Patient has been screened and assessed by RD.    Malnutrition Type:  Context: acute illness or injury  Level: moderate    Malnutrition Characteristic Summary:  Weight Loss (Malnutrition): 10% in 6 months  Subcutaneous Fat (Malnutrition): mild depletion  Muscle Mass (Malnutrition): mild depletion  Fluid Accumulation (Malnutrition): mild    Interventions/Recommendations (treatment strategy):  1.    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 Physical therapy services has been scheduled. Continue all standard measures for pressure injury prevention and consult wound care for any wounds (chronic or acute).    ESRD (end stage renal disease) on dialysis  Creatine stable for now. BMP reviewed- noted Estimated Creatinine Clearance: 14.7 mL/min (A) (based on SCr of 6 mg/dL (H)). according to latest data. Based on current GFR, CKD stage is end stage.  Monitor UOP and serial BMP and adjust therapy as needed. Renally dose meds. Avoid nephrotoxic medications and procedures.    -- HD MWF  -- nephro following  -- sevelamer TID    Status post tracheostomy  Resolved, decannulated 4/30    Acute encephalopathy  Pt is  non-verbal and does not follow commands at baseline. Vascular Neurology consulted given acute change from baseline. MRI with concern for evolution of prior strokes with noted differential of T2 hyperintensities including vasculitis vs demyelination. Discussed with Vascular Neurology; low concern for ongoing vasculitis/demyelination, likely represents typical evolution of prior infarcts.    Patient at baseline as of 4/22.     Plan  - STAT head imaging for concern of new change in mental status/focal deficit    Type 2 diabetes mellitus with hyperglycemia, with long-term current use of insulin  Patient's FSGs are controlled on current medication regimen.  Last A1c reviewed-   Lab Results   Component Value Date    HGBA1C 10.4 (H) 01/30/2024     Most recent fingerstick glucose reviewed-   Recent Labs   Lab 05/07/24  1728 05/07/24  2350 05/08/24  0624 05/08/24  1205   POCTGLUCOSE 133* 133* 172* 107       Current correctional scale  Medium  Maintain anti-hyperglycemic dose as follows-   Antihyperglycemics (From admission, onward)      Start     Stop Route Frequency Ordered    04/17/24 1200  insulin aspart U-100 pen 4 Units         -- SubQ Every 6 hours 04/17/24 0938    04/17/24 0944  insulin aspart U-100 pen 0-10 Units         -- SubQ Every 6 hours PRN 04/17/24 0938    04/13/24 2100  insulin detemir U-100 (Levemir) pen 27 Units         -- SubQ 2 times daily 04/13/24 0931          Hold Oral hypoglycemics while patient is in the hospital.  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 Pt underwent extensive course, currently still healing from this, but no longer has wound vac. Pt's wound currently covered with bandage. Picture in media tabs    Plan  Wound care        VTE Risk Mitigation (From admission, onward)           Ordered     heparin (porcine) injection 3,000 Units  As needed (PRN)         04/17/24 0825     heparin (porcine) injection 1,000 Units  As  needed (PRN)         04/12/24 0951     heparin (porcine) injection 7,500 Units  Every 8 hours         04/11/24 0252                    Discharge Planning   ZEKE: 5/13/2024     Code Status: Full Code   Is the patient medically ready for discharge?: No    Reason for patient still in hospital (select all that apply): Patient trending condition  Discharge Plan A: Skilled Nursing Facility                  Osito Dos Santos MD  Department of Hospital Medicine   [facility] - Telemetry Stepdown

## 2024-05-08 NOTE — PT/OT/SLP PROGRESS
Physical Therapy Treatment    Patient Name:  Sarah Saravia   MRN:  [id number]    Recommendations:     Discharge Recommendations: Moderate Intensity Therapy  Discharge Equipment Recommendations: to be determined by next level of care  Barriers to discharge: Pt requiring increased skilled assistance at current time.     Assessment:     Sarah Saravia is a 53 y.o. female admitted with a medical diagnosis of Acute cystitis with hematuria.  She presents with the following impairments/functional limitations: weakness, impaired endurance, impaired self care skills, impaired functional mobility, gait instability, impaired balance, impaired cognition, decreased coordination, decreased upper extremity function, decreased lower extremity function, decreased safety awareness, pain, decreased ROM, impaired coordination, impaired skin, edema requiring total assistance of 2 helpers and verbal cues for bed mob and OOB transfer due to weakness, pain, fear of falling.   In light of pt's current functional level and deficits, it is anticipated that pt will need to participate in a moderate intensity rehab program consisting of PT and OT in order to achieve full rehab potential to return to previous level of function and roles.  Pt remains motivated to participate in PT session and will cont to benefit from skilled PT intervention..    Rehab Prognosis: Good; patient would benefit from acute skilled PT services to address these deficits and reach maximum level of function.    Recent Surgery: * No surgery found *      Plan:     During this hospitalization, patient to be seen 3 x/week to address the identified rehab impairments via gait training, therapeutic activities, therapeutic exercises, neuromuscular re-education and progress toward the following goals:    Plan of Care Expires:  05/22/24    Subjective     Chief Complaint: pain, fear of falling  Pain/Comfort:  Pain Rating 1:  (pt unable to rate)  Location - Side 1:  Bilateral  Location - Orientation 1: generalized  Location 1: leg (with ex's and transitional movements)  Pain Addressed 1: Reposition, Distraction, Cessation of Activity  Pain Rating Post-Intervention 1:  (pt unable to rate)      Objective:     2 attempts for tx session due to pt URIEL away at HD in am    Communicated with nurse (Beth) prior to session.  Patient found  sup in bed with HOB at 45* angle  with PEG Tube, pressure relief boots, pulse ox (continuous) (no family present) upon PT entry to room.     General Precautions: Standard, aphasia, aspiration, fall, NPO, contact (ESBL/MDRO in urine)  Orthopedic Precautions: N/A  Braces: N/A  Respiratory Status: Room air    **Student PTA assisting with bed mob     Functional Mobility:  Bed Mobility:     Rolling Left:  total A of 2 helpers, HOB at 25* angle, via drawsheet  Rolling Right: total A of 2 helpers, HOB at 25* angle, via drawsheet  Scooting: anteriorly to the EOB with total A of 2   Supine to Sit: total A of 2 for trunk elevation and LE's, exiting on the R side, HOB at 30* angle, with use of drawsheet  Sit to Supine: total A of 2 for trunk and LE's, HOB flat  Transfers:     Sit to Stand:   attempt from EOB, however, pt resists efforts    OOB to medichair transfer total A of 4 helpers (PTA, student PTA, pt's nurse, and PCT) via drawsheet and slide board  Balance: static sitting at the EOB with B UE support close sup; dynamic sitting at the EOB (target reaching with UE's) with min A to lean trunk forward with single UE support. Total sitting at the EOB 18 min      AM-PAC 6 CLICK MOBILITY  Turning over in bed (including adjusting bedclothes, sheets and blankets)?: 2  Sitting down on and standing up from a chair with arms (e.g., wheelchair, bedside commode, etc.): 1  Moving from lying on back to sitting on the side of the bed?: 2  Moving to and from a bed to a chair (including a wheelchair)?: 1  Need to walk in hospital room?: 1  Climbing 3-5 steps with a  railing?: 1  Basic Mobility Total Score: 8       Treatment & Education:  Patient provided with daily orientation and goals of this PT session. They were educated to call for assistance and to transfer with hospital staff only.  Also, pt was educated on the effects of prolonged immobility and the importance of performing OOB activity and exercises to promote healing and reduce recovery time    Patient left  up in medichair  with all lines intact, call button in reach, and nurse notified..    GOALS:   Multidisciplinary Problems       Physical Therapy Goals          Problem: Physical Therapy    Goal Priority Disciplines Outcome Goal Variances Interventions   Physical Therapy Goal     PT, PT/OT Progressing     Description: Goals to be met by: 24   Goals remain appropriate 5/3/2024 to be met by 24    Patient will increase functional independence with mobility by performin. Supine to sit with Maximum Assistance  2. Sit to supine with Maximum Assistance  3. Rolling to Left and Right with Moderate Assistance.  4. Sitting at edge of bed 5 minutes with Moderate Assistance- MET , monitor consistency  5. Lower extremity exercise program x20 reps per handout, with assistance as needed                         Time Tracking:     PT Received On: 24  PT Start Time: 1413     PT Stop Time: 1518  PT Total Time (min): 65 min     Billable Minutes: Therapeutic Activity 50 and Therapeutic Exercise 15    Treatment Type: Treatment  PT/PTA: PTA     Number of PTA visits since last PT visit: 2     2024

## 2024-05-08 NOTE — PLAN OF CARE
Problem: Adjustment to Illness (Chronic Kidney Disease)  Goal: Electrolyte Balance  Outcome: Progressing  Goal: Fluid Balance  Outcome: Progressing     Problem: Electrolyte Imbalance (Chronic Kidney Disease)  Goal: Electrolyte Balance  Outcome: Progressing     Problem: Fluid Volume Excess (Chronic Kidney Disease)  Goal: Fluid Balance  Outcome: Progressing

## 2024-05-08 NOTE — PROGRESS NOTES
05/08/24 1125        Hemodialysis Catheter 04/30/24 1708 right internal jugular   Placement Date/Time: 04/30/24 1708   Present Prior to Hospital Arrival?: Yes  Hand Hygiene: Performed  Barrier Precautions: Performed  Skin Antisepsis: ChloraPrep  Hemodialysis Catheter Type: Tunneled catheter  Location: right internal jugular  Cathet...   Site Assessment No drainage   Line Securement Device Secured with sutureless device   Dressing Type CHG impregnated dressing/sponge   Dressing Status Clean;Dry;Intact   Venous Patency/Care deaccessed;flushed w/o difficulty;heparin locked   Arterial Patency/Care deaccessed;flushed w/o difficulty;heparin locked   During Hemodialysis Assessment   Blood Flow Rate (mL/min) 400 mL/min   Dialysate Flow Rate (mL/min) 700 ml/min   Ultrafiltration Rate (mL/Hr) 610 mL/Hr   Arteriovenous Lines Secure Yes   Arterial Pressure (mmHg) -120 mmHg   Venous Pressure (mmHg) 90   UF Removed (mL) 2150 mL   TMP 50   Venous Line in Air Detector Yes   Transducer Dry Yes   Access Visible Yes    notified of access issue? N/A   Heparin given? N/A   Intra-Hemodialysis Comments HD Completed   Post-Hemodialysis Assessment   Rinseback Volume (mL) 250 mL   Blood Volume Processed (Liters) 78 L   Dialyzer Clearance Moderately streaked   Duration of Treatment 210 minutes   Total UF (mL) 2150 mL   Net Fluid Removal 1500   Patient Response to Treatment Tolerated well     Patient left [facility] by bed NAD.

## 2024-05-08 NOTE — NURSING
Nurses Note -- 4 Eyes      5/8/2024   7:05 AM      Skin assessed during: Q Shift Change      [] No Altered Skin Integrity Present    []Prevention Measures Documented      [x] Yes- Altered Skin Integrity Present or Discovered   [] LDA Added if Not in Epic (Describe Wound)   [] New Altered Skin Integrity was Present on Admit and Documented in LDA   [] Wound Image Taken    Wound Care Consulted? No    Attending Nurse:  Beth Eastman RN/Staff Member:  [place]

## 2024-05-08 NOTE — SUBJECTIVE & OBJECTIVE
Interval History: NAEON. It is important that pt is able to SIT in chair for 4h for dialysis placement needs, even if assisted into chair would greatly help placement      Objective:     Vital Signs (Most Recent):  Temp: 98 °F (36.7 °C) (05/08/24 1125)  Pulse: 104 (05/08/24 1125)  Resp: 18 (05/08/24 1125)  BP: 108/72 (05/08/24 1125)  SpO2: 100 % (05/08/24 1125) Vital Signs (24h Range):  Temp:  [97.8 °F (36.6 °C)-98.8 °F (37.1 °C)] 98 °F (36.7 °C)  Pulse:  [] 104  Resp:  [18-20] 18  SpO2:  [95 %-100 %] 100 %  BP: (102-128)/(67-86) 108/72     Weight: 122.5 kg (270 lb 1 oz)  Body mass index is 42.3 kg/m².    Intake/Output Summary (Last 24 hours) at 5/8/2024 1313  Last data filed at 5/8/2024 1125  Gross per 24 hour   Intake --   Output 2150 ml   Net -2150 ml         Physical Exam  Vitals and nursing note reviewed.   HENT:      Head: Normocephalic and atraumatic.   Neck:      Comments: Trach removed  Cardiovascular:      Rate and Rhythm: Normal rate and regular rhythm.      Heart sounds: Normal heart sounds.   Pulmonary:      Effort: Pulmonary effort is normal. No respiratory distress.      Breath sounds: Normal breath sounds.   Abdominal:      General: Abdomen is flat. There is no distension.      Palpations: Abdomen is soft.      Tenderness: There is no abdominal tenderness.      Comments: PEG tube   Musculoskeletal:      Right lower leg: No edema.      Left lower leg: No edema.      Comments: Moves UEs spontaneously   Skin:     General: Skin is warm and dry.   Neurological:      General: No focal deficit present.      Mental Status: She is alert.      Comments: Nonverbal, not following commands             Significant Labs: All pertinent labs within the past 24 hours have been reviewed.    Significant Imaging: I have reviewed all pertinent imaging results/findings within the past 24 hours.

## 2024-05-08 NOTE — ASSESSMENT & PLAN NOTE
Patient's FSGs are controlled on current medication regimen.  Last A1c reviewed-   Lab Results   Component Value Date    HGBA1C 10.4 (H) 01/30/2024     Most recent fingerstick glucose reviewed-   Recent Labs   Lab 05/07/24  1728 05/07/24  2350 05/08/24  0624 05/08/24  1205   POCTGLUCOSE 133* 133* 172* 107       Current correctional scale  Medium  Maintain anti-hyperglycemic dose as follows-   Antihyperglycemics (From admission, onward)    Start     Stop Route Frequency Ordered    04/17/24 1200  insulin aspart U-100 pen 4 Units         -- SubQ Every 6 hours 04/17/24 0938    04/17/24 0944  insulin aspart U-100 pen 0-10 Units         -- SubQ Every 6 hours PRN 04/17/24 0938    04/13/24 2100  insulin detemir U-100 (Levemir) pen 27 Units         -- SubQ 2 times daily 04/13/24 0931        Hold Oral hypoglycemics while patient is in the hospital.  POCT glucose q6h

## 2024-05-08 NOTE — PROGRESS NOTES
05/08/24 0750 05/08/24 0755        Hemodialysis Catheter 04/30/24 1708 right internal jugular   Placement Date/Time: 04/30/24 1708   Present Prior to Hospital Arrival?: Yes  Hand Hygiene: Performed  Barrier Precautions: Performed  Skin Antisepsis: ChloraPrep  Hemodialysis Catheter Type: Tunneled catheter  Location: right internal jugular  Cathet...   Site Assessment No drainage  --    Line Securement Device Secured with sutureless device  --    Dressing Type CHG impregnated dressing/sponge  --    Venous Patency/Care accessed;blood return present;heparin locked  --    Arterial Patency/Care accessed;blood return present;flushed w/o difficulty  --    During Hemodialysis Assessment   Blood Flow Rate (mL/min)  --  400 mL/min   Dialysate Flow Rate (mL/min)  --  700 ml/min   Ultrafiltration Rate (mL/Hr)  --  610 mL/Hr   Arteriovenous Lines Secure  --  Yes   Arterial Pressure (mmHg)  --  -120 mmHg   Venous Pressure (mmHg)  --  90   Blood Volume Processed (Liters)  --  0 L   UF Removed (mL)  --  0 mL   TMP  --  50   Venous Line in Air Detector  --  Yes   Intake (mL)  --  250 mL   Transducer Dry  --  Yes   Access Visible  --  Yes    notified of access issue?  --  N/A   Heparin given?  --  N/A   Intra-Hemodialysis Comments  --  HD started     Patient Arrived [facility] by bed. Maintenance HD started via right subclavian CVC.

## 2024-05-08 NOTE — PLAN OF CARE
Problem: Infection  Goal: Absence of Infection Signs and Symptoms  Outcome: Progressing     Problem: Skin Injury Risk Increased  Goal: Skin Health and Integrity  Outcome: Progressing     Problem: Adult Inpatient Plan of Care  Goal: Plan of Care Review  Outcome: Progressing  Goal: Patient-Specific Goal (Individualized)  Outcome: Progressing  Goal: Absence of Hospital-Acquired Illness or Injury  Outcome: Progressing  Goal: Optimal Comfort and Wellbeing  Outcome: Progressing  Goal: Readiness for Transition of Care  Outcome: Progressing   Patient sat in chair at bedside until 1725. Peg tube intact with TF in progress.

## 2024-05-08 NOTE — ASSESSMENT & PLAN NOTE
Important that patient is able to sit in chair for 4h for dialysis placement needs, even if she requires lift/assistance getting into the chair     This patient has Chronic right hemiplegia due to stroke. Physical therapy services has been scheduled. Continue all standard measures for pressure injury prevention and consult wound care for any wounds (chronic or acute).

## 2024-05-08 NOTE — PROGRESS NOTES
[facility] NOTE     Patient currently on hemodialysis for removal of uremic toxins .     Patient seen and evaluated on hemodialysis, tolerating treatment, see HD flowsheet for vitals and assessments.      No Hypotension, chest pain, shortness of breath, cramping, nausea or vomiting.     Target UF: 1.5 L as tolerated, keep MAP >65.  No distress during exam. Awaiting placement.   Continue EPO  Continue Sevelamer.   No lab stick/BP intake on access site  Continue to monitor intake and output, daily weights   Please avoid gadolinium, fleets, phos-based laxatives, NSAIDs  Will follow closely and continue dialysis treatments while in-patient    SHERLY Gregory DNP, APRN, FNP-C  Department of Nephrology  [facility]  Pager: [phone]

## 2024-05-09 LAB
POCT GLUCOSE: 157 MG/DL (ref 70–110)
POCT GLUCOSE: 159 MG/DL (ref 70–110)
POCT GLUCOSE: 169 MG/DL (ref 70–110)
POCT GLUCOSE: 177 MG/DL (ref 70–110)
POCT GLUCOSE: 202 MG/DL (ref 70–110)

## 2024-05-09 PROCEDURE — 25000003 PHARM REV CODE 250

## 2024-05-09 PROCEDURE — 25000003 PHARM REV CODE 250: Performed by: STUDENT IN AN ORGANIZED HEALTH CARE EDUCATION/TRAINING PROGRAM

## 2024-05-09 PROCEDURE — 25000003 PHARM REV CODE 250: Performed by: INTERNAL MEDICINE

## 2024-05-09 PROCEDURE — 20600001 HC STEP DOWN PRIVATE ROOM

## 2024-05-09 PROCEDURE — 25000242 PHARM REV CODE 250 ALT 637 W/ HCPCS

## 2024-05-09 PROCEDURE — 27000207 HC ISOLATION

## 2024-05-09 PROCEDURE — 94761 N-INVAS EAR/PLS OXIMETRY MLT: CPT

## 2024-05-09 PROCEDURE — 63600175 PHARM REV CODE 636 W HCPCS

## 2024-05-09 RX ORDER — SODIUM CHLORIDE 9 MG/ML
INJECTION, SOLUTION INTRAVENOUS ONCE
Status: COMPLETED | OUTPATIENT
Start: 2024-05-10 | End: 2024-05-10

## 2024-05-09 RX ADMIN — ZINC SULFATE 220 MG (50 MG) CAPSULE 220 MG: CAPSULE at 09:05

## 2024-05-09 RX ADMIN — SEVELAMER CARBONATE 0.8 G: 2400 POWDER, FOR SUSPENSION ORAL at 09:05

## 2024-05-09 RX ADMIN — PSYLLIUM HUSK 1 PACKET: 3.4 POWDER ORAL at 09:05

## 2024-05-09 RX ADMIN — POLYETHYLENE GLYCOL 3350 17 G: 17 POWDER, FOR SOLUTION ORAL at 10:05

## 2024-05-09 RX ADMIN — PANTOPRAZOLE SODIUM 40 MG: 40 GRANULE, DELAYED RELEASE ORAL at 09:05

## 2024-05-09 RX ADMIN — ASPIRIN 81 MG CHEWABLE TABLET 81 MG: 81 TABLET CHEWABLE at 09:05

## 2024-05-09 RX ADMIN — METOPROLOL TARTRATE 25 MG: 25 TABLET, FILM COATED ORAL at 09:05

## 2024-05-09 RX ADMIN — HEPARIN SODIUM 7500 UNITS: 5000 INJECTION INTRAVENOUS; SUBCUTANEOUS at 10:05

## 2024-05-09 RX ADMIN — POLYETHYLENE GLYCOL 3350 17 G: 17 POWDER, FOR SOLUTION ORAL at 09:05

## 2024-05-09 RX ADMIN — SEVELAMER CARBONATE 0.8 G: 2400 POWDER, FOR SUSPENSION ORAL at 03:05

## 2024-05-09 RX ADMIN — INSULIN ASPART 4 UNITS: 100 INJECTION, SOLUTION INTRAVENOUS; SUBCUTANEOUS at 05:05

## 2024-05-09 RX ADMIN — INSULIN DETEMIR 27 UNITS: 100 INJECTION, SOLUTION SUBCUTANEOUS at 08:05

## 2024-05-09 RX ADMIN — INSULIN DETEMIR 27 UNITS: 100 INJECTION, SOLUTION SUBCUTANEOUS at 10:05

## 2024-05-09 RX ADMIN — INSULIN ASPART 4 UNITS: 100 INJECTION, SOLUTION INTRAVENOUS; SUBCUTANEOUS at 10:05

## 2024-05-09 RX ADMIN — HEPARIN SODIUM 7500 UNITS: 5000 INJECTION INTRAVENOUS; SUBCUTANEOUS at 03:05

## 2024-05-09 RX ADMIN — INSULIN ASPART 4 UNITS: 100 INJECTION, SOLUTION INTRAVENOUS; SUBCUTANEOUS at 12:05

## 2024-05-09 RX ADMIN — DOCUSATE SODIUM AND SENNOSIDES 1 TABLET: 8.6; 5 TABLET, FILM COATED ORAL at 10:05

## 2024-05-09 RX ADMIN — SEVELAMER CARBONATE 0.8 G: 2400 POWDER, FOR SUSPENSION ORAL at 10:05

## 2024-05-09 RX ADMIN — INSULIN ASPART 1 UNITS: 100 INJECTION, SOLUTION INTRAVENOUS; SUBCUTANEOUS at 12:05

## 2024-05-09 RX ADMIN — INSULIN ASPART 4 UNITS: 100 INJECTION, SOLUTION INTRAVENOUS; SUBCUTANEOUS at 06:05

## 2024-05-09 RX ADMIN — Medication 500 MG: at 09:05

## 2024-05-09 RX ADMIN — METOPROLOL TARTRATE 25 MG: 25 TABLET, FILM COATED ORAL at 10:05

## 2024-05-09 RX ADMIN — HEPARIN SODIUM 7500 UNITS: 5000 INJECTION INTRAVENOUS; SUBCUTANEOUS at 06:05

## 2024-05-09 RX ADMIN — Medication 500 MG: at 10:05

## 2024-05-09 RX ADMIN — ATORVASTATIN CALCIUM 40 MG: 40 TABLET, FILM COATED ORAL at 09:05

## 2024-05-09 RX ADMIN — DOCUSATE SODIUM AND SENNOSIDES 1 TABLET: 8.6; 5 TABLET, FILM COATED ORAL at 09:05

## 2024-05-09 NOTE — PLAN OF CARE
Problem: Infection  Goal: Absence of Infection Signs and Symptoms  Outcome: Progressing     Problem: Skin Injury Risk Increased  Goal: Skin Health and Integrity  Outcome: Progressing     Problem: Adult Inpatient Plan of Care  Goal: Plan of Care Review  Outcome: Progressing  Goal: Patient-Specific Goal (Individualized)  Outcome: Progressing  Goal: Absence of Hospital-Acquired Illness or Injury  Outcome: Progressing  Goal: Optimal Comfort and Wellbeing  Outcome: Progressing  Goal: Readiness for Transition of Care  Outcome: Progressing     Problem: Bariatric Environmental Safety  Goal: Safety Maintained with Care  Outcome: Progressing     Problem: Device-Related Complication Risk (Hemodialysis)  Goal: Safe, Effective Therapy Delivery  Outcome: Progressing     Problem: Hemodynamic Instability (Hemodialysis)  Goal: Effective Tissue Perfusion  Outcome: Progressing     Problem: Infection (Hemodialysis)  Goal: Absence of Infection Signs and Symptoms  Outcome: Progressing     Problem: Diabetes Comorbidity  Goal: Blood Glucose Level Within Targeted Range  Outcome: Progressing     Problem: Adjustment to Illness (Sepsis/Septic Shock)  Goal: Optimal Coping  Outcome: Progressing     Problem: Glycemic Control Impaired (Sepsis/Septic Shock)  Goal: Blood Glucose Level Within Desired Range  Outcome: Progressing     Problem: Infection Progression (Sepsis/Septic Shock)  Goal: Absence of Infection Signs and Symptoms  Outcome: Progressing     Problem: Nutrition Impaired (Sepsis/Septic Shock)  Goal: Optimal Nutrition Intake  Outcome: Progressing     Problem: Fall Injury Risk  Goal: Absence of Fall and Fall-Related Injury  Outcome: Progressing     Problem: Adjustment to Illness (Chronic Kidney Disease)  Goal: Optimal Coping with Chronic Illness  Outcome: Progressing  Goal: Electrolyte Balance  Outcome: Progressing  Goal: Fluid Balance  Outcome: Progressing     Problem: Electrolyte Imbalance (Chronic Kidney Disease)  Goal: Electrolyte  Balance  Outcome: Progressing     Problem: Fluid Volume Excess (Chronic Kidney Disease)  Goal: Fluid Balance  Outcome: Progressing     Problem: Functional Decline (Chronic Kidney Disease)  Goal: Optimal Functional Ability  Outcome: Progressing     Problem: Hematologic Alteration (Chronic Kidney Disease)  Goal: Absence of Anemia Signs and Symptoms  Outcome: Progressing     Problem: Oral Intake Inadequate (Chronic Kidney Disease)  Goal: Optimal Oral Intake  Outcome: Progressing     Problem: Pain (Chronic Kidney Disease)  Goal: Acceptable Pain Control  Outcome: Progressing     Problem: Renal Function Impairment (Chronic Kidney Disease)  Goal: Minimize Renal Failure Effects  Outcome: Progressing     Problem: Restraint, Nonviolent  Goal: Absence of Harm or Injury  Outcome: Progressing     Problem: Wound  Goal: Optimal Coping  Outcome: Progressing  Goal: Optimal Functional Ability  Outcome: Progressing  Goal: Absence of Infection Signs and Symptoms  Outcome: Progressing  Goal: Improved Oral Intake  Outcome: Progressing  Goal: Optimal Pain Control and Function  Outcome: Progressing  Goal: Skin Health and Integrity  Outcome: Progressing  Goal: Optimal Wound Healing  Outcome: Progressing     Pt remained free of falls or injuries during shift. No signs of acute distress noted during shift.

## 2024-05-09 NOTE — ASSESSMENT & PLAN NOTE
Patient's FSGs are controlled on current medication regimen.  Last A1c reviewed-   Lab Results   Component Value Date    HGBA1C 10.4 (H) 01/30/2024     Most recent fingerstick glucose reviewed-   Recent Labs   Lab 05/08/24  1629 05/09/24  0050 05/09/24  0607 05/09/24  1202   POCTGLUCOSE 153* 157* 169* 202*       Current correctional scale  Medium  Maintain anti-hyperglycemic dose as follows-   Antihyperglycemics (From admission, onward)    Start     Stop Route Frequency Ordered    04/17/24 1200  insulin aspart U-100 pen 4 Units         -- SubQ Every 6 hours 04/17/24 0938    04/17/24 0944  insulin aspart U-100 pen 0-10 Units         -- SubQ Every 6 hours PRN 04/17/24 0938    04/13/24 2100  insulin detemir U-100 (Levemir) pen 27 Units         -- SubQ 2 times daily 04/13/24 0931        Hold Oral hypoglycemics while patient is in the hospital.  POCT glucose q6h

## 2024-05-09 NOTE — NURSING
Nurses Note -- 4 Eyes      5/9/2024   6:36 AM      Skin assessed during: Q Shift Change      [] No Altered Skin Integrity Present    []Prevention Measures Documented      [x] Yes- Altered Skin Integrity Present or Discovered   [] LDA Added if Not in Epic (Describe Wound)   [] New Altered Skin Integrity was Present on Admit and Documented in LDA   [x] Wound Image Taken    Wound Care Consulted? Yes    Attending Nurse:  Nan Eastman RN/Staff Member: GONZALEZ Licea

## 2024-05-09 NOTE — SUBJECTIVE & OBJECTIVE
Interval History: NAEON. Working on letting pt sit in chair for longer periods. Pending placement      Objective:     Vital Signs (Most Recent):  Temp: 98.7 °F (37.1 °C) (05/09/24 1119)  Pulse: 94 (05/09/24 1119)  Resp: 18 (05/09/24 1119)  BP: 125/82 (05/09/24 1119)  SpO2: 98 % (05/09/24 1119) Vital Signs (24h Range):  Temp:  [97.6 °F (36.4 °C)-99 °F (37.2 °C)] 98.7 °F (37.1 °C)  Pulse:  [] 94  Resp:  [18-20] 18  SpO2:  [95 %-100 %] 98 %  BP: (103-136)/(67-85) 125/82     Weight: 122.5 kg (270 lb 1 oz)  Body mass index is 42.3 kg/m².    Intake/Output Summary (Last 24 hours) at 5/9/2024 1310  Last data filed at 5/8/2024 1830  Gross per 24 hour   Intake 320 ml   Output 0 ml   Net 320 ml         Physical Exam  Vitals and nursing note reviewed.   HENT:      Head: Normocephalic and atraumatic.   Neck:      Comments: Trach removed  Cardiovascular:      Rate and Rhythm: Normal rate and regular rhythm.      Heart sounds: Normal heart sounds.   Pulmonary:      Effort: Pulmonary effort is normal. No respiratory distress.      Breath sounds: Normal breath sounds.   Abdominal:      General: Abdomen is flat. There is no distension.      Palpations: Abdomen is soft.      Tenderness: There is no abdominal tenderness.      Comments: PEG tube   Musculoskeletal:      Right lower leg: No edema.      Left lower leg: No edema.      Comments: Moves UEs spontaneously   Skin:     General: Skin is warm and dry.   Neurological:      General: No focal deficit present.      Mental Status: She is alert.      Comments: Nonverbal, not following commands             Significant Labs: All pertinent labs within the past 24 hours have been reviewed.    Significant Imaging: I have reviewed all pertinent imaging results/findings within the past 24 hours.

## 2024-05-09 NOTE — PLAN OF CARE
Problem: Infection  Goal: Absence of Infection Signs and Symptoms  Outcome: Progressing     Problem: Adult Inpatient Plan of Care  Goal: Plan of Care Review  Outcome: Progressing  Goal: Patient-Specific Goal (Individualized)  Outcome: Progressing  Goal: Absence of Hospital-Acquired Illness or Injury  Outcome: Progressing  Goal: Optimal Comfort and Wellbeing  Outcome: Progressing  Goal: Readiness for Transition of Care  Outcome: Progressing     Problem: Bariatric Environmental Safety  Goal: Safety Maintained with Care  Outcome: Progressing

## 2024-05-09 NOTE — PLAN OF CARE
05/09/24 1449   Post-Acute Status   Post-Acute Authorization Placement   Post-Acute Placement Status Referrals Sent     Additional SNF referrals sent via [facility].    Sara Loredo RN  Ext [phone]

## 2024-05-09 NOTE — PROGRESS NOTES
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 History was given from patient's daughter. She was undergoing dialysis today and noticed she was lethargic, less alert than usual self. Pt completed dialysis and still not acting herself. EMS was called, fever of a 100.  On chart review, she did have an episode of large volume emesis around 1700.  Per EMS, she had a slightly elevated temp 100.0°F, glucose 300s.     In the ED: UA 2+ leuks, >100 WBC, many bacteria, WBC 17, CT abd/pelvis concerning for cystitis. Given vanc/zosyn    Overview/Hospital Course:  Patient was admitted to Hospital Medicine service for medical management and evaluation of urosepsis. Patient was continued on vanc/zosyn. Vascular Neurology consulted concerning mental status change with the recommendation to initiate ASA 81 QD and to obtain an MRI to r/o new stroke. Imaging pending. Nephrology was consulted for regularly scheduled dialysis. Afternoon of 4/12, patient was unable to tolerate filtration of volume during dialsis 2/2 hypotension. Patient received 500cc bolus and was transferred back to floor after finishing the session without volume removed. Will monitor for signs of volume overload. Tailoring insulin regimen while uptitrating tube feeds to goal rate. Staph Epi in all 4 bottles; ID with recommendation to continue Vanc & de-escalate meropenem to ertapenem on 04/15 through 4/16. Patient completed IV course of UTI coverage. Patient tolerated volume removal well in dialysis  throughout the rest of her hospital stay. Pending discharge to LTACH pending bed availability. Patient without BM with one episode of vomiting overnight 4/17. KUB with bowel gas and low concern for obstruction with no transition point noted. Escalating bowel regimen. Pending placement as of 4/22. Pulm consult placed to evaluate for possible trach downsize & eventual decannulation to assist placement if safe. Trach capping trial per pulm for 48h and will assess for decannulation on Monday. DVT studies negative. Pulm successfully decannulated pt 4/30, IR exchanged TDC. Pending swallow study with SLP and waiting for dispo options. Pt failed swallow study, placement pending. Working with PT on mobilize pt to sitting in a chair to expand placement options    Interval History: NAEON. Working on letting pt sit in chair for longer periods. Pending placement      Objective:     Vital Signs (Most Recent):  Temp: 98.7 °F (37.1 °C) (05/09/24 1119)  Pulse: 94 (05/09/24 1119)  Resp: 18 (05/09/24 1119)  BP: 125/82 (05/09/24 1119)  SpO2: 98 % (05/09/24 1119) Vital Signs (24h Range):  Temp:  [97.6 °F (36.4 °C)-99 °F (37.2 °C)] 98.7 °F (37.1 °C)  Pulse:  [] 94  Resp:  [18-20] 18  SpO2:  [95 %-100 %] 98 %  BP: (103-136)/(67-85) 125/82     Weight: 122.5 kg (270 lb 1 oz)  Body mass index is 42.3 kg/m².    Intake/Output Summary (Last 24 hours) at 5/9/2024 1310  Last data filed at 5/8/2024 1830  Gross per 24 hour   Intake 320 ml   Output 0 ml   Net 320 ml         Physical Exam  Vitals and nursing note reviewed.   HENT:      Head: Normocephalic and atraumatic.   Neck:      Comments: Trach removed  Cardiovascular:      Rate and Rhythm: Normal rate and regular rhythm.      Heart sounds: Normal heart sounds.   Pulmonary:      Effort: Pulmonary effort is normal. No respiratory distress.      Breath sounds: Normal breath sounds.   Abdominal:      General: Abdomen is flat. There is no distension.      Palpations: Abdomen is soft.       Tenderness: There is no abdominal tenderness.      Comments: PEG tube   Musculoskeletal:      Right lower leg: No edema.      Left lower leg: No edema.      Comments: Moves UEs spontaneously   Skin:     General: Skin is warm and dry.   Neurological:      General: No focal deficit present.      Mental Status: She is alert.      Comments: Nonverbal, not following commands             Significant Labs: All pertinent labs within the past 24 hours have been reviewed.    Significant Imaging: I have reviewed all pertinent imaging results/findings within the past 24 hours.    Assessment/Plan:      * Acute cystitis with hematuria  resolved    Pt presenting with AMS and worsening lethargy. Pt is non-verbal at baseline, does not follow commands, but was less responsive than normal. Pt found to have a fever. Urinary source suspected based off of urine and CT abd/pelvis. Pt has many prior resistant bacterial infections including urinary sources. Has prior with sensitivity to zosyn and has also improved off ED dose of vanc/zosyn. Lactic elevated a 3.8->3.49 prior to completion of fluid bolus 500ml.    Patient with new fever and tachycardia on 4/25. Low threshold to initiate additional infectious workup and repeat UA.     Plan  S/p course of vanc/ertapenem per ID. Course completed 4/16.  Daily cbc  Contact precautions    *on contact precautions indefinitely while patient still makes urine given pt incontinent per infection control    Complication of vascular dialysis catheter  Cath intermittently clogging, not resolving with cath-hedy, going for IR venogram 4/30 with possible exchange. S/p exchange with IR on 4/30      Constipation  RESOLVED with Bowel regimen  Patient without BM x5d. One episode of vomitus night of 4/17. Some concern for bowel obstruction. Tolerating continuous tube feeds at goal rate with 0cc on residuals. Physical exam with bowel sounds, soft nontender abdomen. KUB without concern for obstruction with bowel gas,  lack of transition point.    Patient now with regular bowel movements on bowel regimen.     Plan  - Miralax BID, Senna BID  - compazine PRN    Moderate malnutrition  Nutrition consulted. Most recent weight and BMI monitored-     Measurements:  Wt Readings from Last 1 Encounters:   05/08/24 122.5 kg (270 lb 1 oz)   Body mass index is 42.3 kg/m².    Patient has been screened and assessed by RD.    Malnutrition Type:  Context: acute illness or injury  Level: moderate    Malnutrition Characteristic Summary:  Weight Loss (Malnutrition): 10% in 6 months  Subcutaneous Fat (Malnutrition): mild depletion  Muscle Mass (Malnutrition): mild depletion  Fluid Accumulation (Malnutrition): mild    Interventions/Recommendations (treatment strategy):  1.    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 Physical therapy services has been scheduled. Continue all standard measures for pressure injury prevention and consult wound care for any wounds (chronic or acute).    ESRD (end stage renal disease) on dialysis  Creatine stable for now. BMP reviewed- noted Estimated Creatinine Clearance: 14.7 mL/min (A) (based on SCr of 6 mg/dL (H)). according to latest data. Based on current GFR, CKD stage is end stage.  Monitor UOP and serial BMP and adjust therapy as needed. Renally dose meds. Avoid nephrotoxic medications and procedures.    -- HD MWF  -- nephro following  -- sevelamer TID    Status post tracheostomy  Resolved, decannulated 4/30    Acute encephalopathy  Pt is non-verbal and does not follow commands at baseline. Vascular Neurology  consulted given acute change from baseline. MRI with concern for evolution of prior strokes with noted differential of T2 hyperintensities including vasculitis vs demyelination. Discussed with Vascular Neurology; low concern for ongoing vasculitis/demyelination, likely represents typical evolution of prior infarcts.    Patient at baseline as of 4/22.     Plan  - STAT head imaging for concern of new change in mental status/focal deficit    Type 2 diabetes mellitus with hyperglycemia, with long-term current use of insulin  Patient's FSGs are controlled on current medication regimen.  Last A1c reviewed-   Lab Results   Component Value Date    HGBA1C 10.4 (H) 01/30/2024     Most recent fingerstick glucose reviewed-   Recent Labs   Lab 05/08/24  1629 05/09/24  0050 05/09/24  0607 05/09/24  1202   POCTGLUCOSE 153* 157* 169* 202*       Current correctional scale  Medium  Maintain anti-hyperglycemic dose as follows-   Antihyperglycemics (From admission, onward)      Start     Stop Route Frequency Ordered    04/17/24 1200  insulin aspart U-100 pen 4 Units         -- SubQ Every 6 hours 04/17/24 0938    04/17/24 0944  insulin aspart U-100 pen 0-10 Units         -- SubQ Every 6 hours PRN 04/17/24 0938    04/13/24 2100  insulin detemir U-100 (Levemir) pen 27 Units         -- SubQ 2 times daily 04/13/24 0931          Hold Oral hypoglycemics while patient is in the hospital.  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 Pt underwent extensive course, currently still healing from this, but no longer has wound vac. Pt's wound currently covered with bandage. Picture in media tabs    Plan  Wound care        VTE Risk Mitigation (From admission, onward)           Ordered     heparin (porcine) injection 3,000 Units  As needed (PRN)         04/17/24 0825     heparin (porcine) injection 1,000 Units  As needed (PRN)         04/12/24 0951     heparin (porcine) injection  7,500 Units  Every 8 hours         04/11/24 0252                    Discharge Planning   ZEKE: 5/13/2024     Code Status: Full Code   Is the patient medically ready for discharge?: No    Reason for patient still in hospital (select all that apply): Patient trending condition  Discharge Plan A: New Nursing Home placement - shelter care facility                  Osito Dos Santos MD  Department of Hospital Medicine   [facility] - Telemetry Stepdown

## 2024-05-09 NOTE — PLAN OF CARE
Met with patient's daughter and her sister to discuss discharge planning. After a lengthy discussion, they were agreeable to sending additional referrals to skilled facilities and they are discussing Ms. Saravia being discharged home with family. MD informed of above conversation. Will continue to follow.      Sara Loredo RN  Ext [phone]

## 2024-05-10 LAB
ANION GAP SERPL CALC-SCNC: 12 MMOL/L (ref 8–16)
BASOPHILS # BLD AUTO: 0.06 K/UL (ref 0–0.2)
BASOPHILS NFR BLD: 0.6 % (ref 0–1.9)
BUN SERPL-MCNC: 46 MG/DL (ref 6–20)
CALCIUM SERPL-MCNC: 10.3 MG/DL (ref 8.7–10.5)
CHLORIDE SERPL-SCNC: 90 MMOL/L (ref 95–110)
CO2 SERPL-SCNC: 25 MMOL/L (ref 23–29)
CREAT SERPL-MCNC: 5.7 MG/DL (ref 0.5–1.4)
DIFFERENTIAL METHOD BLD: ABNORMAL
EOSINOPHIL # BLD AUTO: 0.7 K/UL (ref 0–0.5)
EOSINOPHIL NFR BLD: 6.4 % (ref 0–8)
ERYTHROCYTE [DISTWIDTH] IN BLOOD BY AUTOMATED COUNT: 14.8 % (ref 11.5–14.5)
EST. GFR  (NO RACE VARIABLE): 8.3 ML/MIN/1.73 M^2
GLUCOSE SERPL-MCNC: 160 MG/DL (ref 70–110)
HCT VFR BLD AUTO: 31.6 % (ref 37–48.5)
HGB BLD-MCNC: 9.7 G/DL (ref 12–16)
IMM GRANULOCYTES # BLD AUTO: 0.06 K/UL (ref 0–0.04)
IMM GRANULOCYTES NFR BLD AUTO: 0.6 % (ref 0–0.5)
LYMPHOCYTES # BLD AUTO: 2.5 K/UL (ref 1–4.8)
LYMPHOCYTES NFR BLD: 23.2 % (ref 18–48)
MCH RBC QN AUTO: 26.9 PG (ref 27–31)
MCHC RBC AUTO-ENTMCNC: 30.7 G/DL (ref 32–36)
MCV RBC AUTO: 88 FL (ref 82–98)
MONOCYTES # BLD AUTO: 1.2 K/UL (ref 0.3–1)
MONOCYTES NFR BLD: 10.9 % (ref 4–15)
NEUTROPHILS # BLD AUTO: 6.3 K/UL (ref 1.8–7.7)
NEUTROPHILS NFR BLD: 58.3 % (ref 38–73)
NRBC BLD-RTO: 0 /100 WBC
PLATELET # BLD AUTO: 333 K/UL (ref 150–450)
PMV BLD AUTO: 9.5 FL (ref 9.2–12.9)
POCT GLUCOSE: 169 MG/DL (ref 70–110)
POCT GLUCOSE: 177 MG/DL (ref 70–110)
POCT GLUCOSE: 188 MG/DL (ref 70–110)
POCT GLUCOSE: 214 MG/DL (ref 70–110)
POTASSIUM SERPL-SCNC: 3.8 MMOL/L (ref 3.5–5.1)
RBC # BLD AUTO: 3.6 M/UL (ref 4–5.4)
SODIUM SERPL-SCNC: 127 MMOL/L (ref 136–145)
WBC # BLD AUTO: 10.73 K/UL (ref 3.9–12.7)

## 2024-05-10 PROCEDURE — 25000003 PHARM REV CODE 250: Performed by: INTERNAL MEDICINE

## 2024-05-10 PROCEDURE — 27000207 HC ISOLATION

## 2024-05-10 PROCEDURE — 25000003 PHARM REV CODE 250

## 2024-05-10 PROCEDURE — 80100014 HC HEMODIALYSIS 1:1

## 2024-05-10 PROCEDURE — 92526 ORAL FUNCTION THERAPY: CPT

## 2024-05-10 PROCEDURE — 85025 COMPLETE CBC W/AUTO DIFF WBC: CPT

## 2024-05-10 PROCEDURE — 63600175 PHARM REV CODE 636 W HCPCS: Performed by: STUDENT IN AN ORGANIZED HEALTH CARE EDUCATION/TRAINING PROGRAM

## 2024-05-10 PROCEDURE — 63600175 PHARM REV CODE 636 W HCPCS

## 2024-05-10 PROCEDURE — 80048 BASIC METABOLIC PNL TOTAL CA: CPT

## 2024-05-10 PROCEDURE — 63600175 PHARM REV CODE 636 W HCPCS: Performed by: NURSE PRACTITIONER

## 2024-05-10 PROCEDURE — 97535 SELF CARE MNGMENT TRAINING: CPT

## 2024-05-10 PROCEDURE — 25000003 PHARM REV CODE 250: Performed by: STUDENT IN AN ORGANIZED HEALTH CARE EDUCATION/TRAINING PROGRAM

## 2024-05-10 PROCEDURE — 25000003 PHARM REV CODE 250: Performed by: NURSE PRACTITIONER

## 2024-05-10 PROCEDURE — 36415 COLL VENOUS BLD VENIPUNCTURE: CPT

## 2024-05-10 PROCEDURE — 90935 HEMODIALYSIS ONE EVALUATION: CPT | Mod: ,,, | Performed by: NURSE PRACTITIONER

## 2024-05-10 PROCEDURE — 20600001 HC STEP DOWN PRIVATE ROOM

## 2024-05-10 PROCEDURE — 25000242 PHARM REV CODE 250 ALT 637 W/ HCPCS

## 2024-05-10 RX ADMIN — DOCUSATE SODIUM AND SENNOSIDES 1 TABLET: 8.6; 5 TABLET, FILM COATED ORAL at 11:05

## 2024-05-10 RX ADMIN — PSYLLIUM HUSK 1 PACKET: 3.4 POWDER ORAL at 11:05

## 2024-05-10 RX ADMIN — ASPIRIN 81 MG CHEWABLE TABLET 81 MG: 81 TABLET CHEWABLE at 11:05

## 2024-05-10 RX ADMIN — HEPARIN SODIUM 3000 UNITS: 1000 INJECTION, SOLUTION INTRAVENOUS; SUBCUTANEOUS at 07:05

## 2024-05-10 RX ADMIN — INSULIN DETEMIR 27 UNITS: 100 INJECTION, SOLUTION SUBCUTANEOUS at 12:05

## 2024-05-10 RX ADMIN — HEPARIN SODIUM 7500 UNITS: 5000 INJECTION INTRAVENOUS; SUBCUTANEOUS at 06:05

## 2024-05-10 RX ADMIN — DOCUSATE SODIUM AND SENNOSIDES 1 TABLET: 8.6; 5 TABLET, FILM COATED ORAL at 09:05

## 2024-05-10 RX ADMIN — INSULIN DETEMIR 27 UNITS: 100 INJECTION, SOLUTION SUBCUTANEOUS at 09:05

## 2024-05-10 RX ADMIN — METOPROLOL TARTRATE 25 MG: 25 TABLET, FILM COATED ORAL at 09:05

## 2024-05-10 RX ADMIN — INSULIN ASPART 4 UNITS: 100 INJECTION, SOLUTION INTRAVENOUS; SUBCUTANEOUS at 12:05

## 2024-05-10 RX ADMIN — ATORVASTATIN CALCIUM 40 MG: 40 TABLET, FILM COATED ORAL at 11:05

## 2024-05-10 RX ADMIN — POLYETHYLENE GLYCOL 3350 17 G: 17 POWDER, FOR SOLUTION ORAL at 09:05

## 2024-05-10 RX ADMIN — Medication 500 MG: at 09:05

## 2024-05-10 RX ADMIN — ERYTHROPOIETIN 6100 UNITS: 10000 INJECTION, SOLUTION INTRAVENOUS; SUBCUTANEOUS at 08:05

## 2024-05-10 RX ADMIN — ONDANSETRON 4 MG: 2 INJECTION INTRAMUSCULAR; INTRAVENOUS at 12:05

## 2024-05-10 RX ADMIN — HEPARIN SODIUM 1000 UNITS: 1000 INJECTION, SOLUTION INTRAVENOUS; SUBCUTANEOUS at 10:05

## 2024-05-10 RX ADMIN — SEVELAMER CARBONATE 0.8 G: 2400 POWDER, FOR SUSPENSION ORAL at 11:05

## 2024-05-10 RX ADMIN — METOPROLOL TARTRATE 25 MG: 25 TABLET, FILM COATED ORAL at 11:05

## 2024-05-10 RX ADMIN — ZINC SULFATE 220 MG (50 MG) CAPSULE 220 MG: CAPSULE at 12:05

## 2024-05-10 RX ADMIN — INSULIN ASPART 4 UNITS: 100 INJECTION, SOLUTION INTRAVENOUS; SUBCUTANEOUS at 06:05

## 2024-05-10 RX ADMIN — SEVELAMER CARBONATE 0.8 G: 2400 POWDER, FOR SUSPENSION ORAL at 09:05

## 2024-05-10 RX ADMIN — Medication 500 MG: at 11:05

## 2024-05-10 RX ADMIN — SEVELAMER CARBONATE 0.8 G: 2400 POWDER, FOR SUSPENSION ORAL at 02:05

## 2024-05-10 RX ADMIN — INSULIN ASPART 4 UNITS: 100 INJECTION, SOLUTION INTRAVENOUS; SUBCUTANEOUS at 07:05

## 2024-05-10 RX ADMIN — SODIUM CHLORIDE: 9 INJECTION, SOLUTION INTRAVENOUS at 07:05

## 2024-05-10 RX ADMIN — HEPARIN SODIUM 7500 UNITS: 5000 INJECTION INTRAVENOUS; SUBCUTANEOUS at 02:05

## 2024-05-10 RX ADMIN — PANTOPRAZOLE SODIUM 40 MG: 40 GRANULE, DELAYED RELEASE ORAL at 11:05

## 2024-05-10 RX ADMIN — HEPARIN SODIUM 7500 UNITS: 5000 INJECTION INTRAVENOUS; SUBCUTANEOUS at 09:05

## 2024-05-10 NOTE — PROGRESS NOTES
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 History was given from patient's daughter. She was undergoing dialysis today and noticed she was lethargic, less alert than usual self. Pt completed dialysis and still not acting herself. EMS was called, fever of a 100.  On chart review, she did have an episode of large volume emesis around 1700.  Per EMS, she had a slightly elevated temp 100.0°F, glucose 300s.     In the ED: UA 2+ leuks, >100 WBC, many bacteria, WBC 17, CT abd/pelvis concerning for cystitis. Given vanc/zosyn    Overview/Hospital Course:  Patient was admitted to Hospital Medicine service for medical management and evaluation of urosepsis. Patient was continued on vanc/zosyn. Vascular Neurology consulted concerning mental status change with the recommendation to initiate ASA 81 QD and to obtain an MRI to r/o new stroke. Imaging pending. Nephrology was consulted for regularly scheduled dialysis. Afternoon of 4/12, patient was unable to tolerate filtration of volume during dialsis 2/2 hypotension. Patient received 500cc bolus and was transferred back to floor after finishing the session without volume removed. Will monitor for signs of volume overload. Tailoring insulin regimen while uptitrating tube feeds to goal rate. Staph Epi in all 4 bottles; ID with recommendation to continue Vanc & de-escalate meropenem to ertapenem on 04/15 through 4/16. Patient completed IV course of UTI coverage. Patient tolerated volume removal well in dialysis  throughout the rest of her hospital stay. Pending discharge to LTACH pending bed availability. Patient without BM with one episode of vomiting overnight 4/17. KUB with bowel gas and low concern for obstruction with no transition point noted. Escalating bowel regimen. Pending placement as of 4/22. Pulm consult placed to evaluate for possible trach downsize & eventual decannulation to assist placement if safe. Trach capping trial per pulm for 48h and will assess for decannulation on Monday. DVT studies negative. Pulm successfully decannulated pt 4/30, IR exchanged TDC. Pending swallow study with SLP and waiting for dispo options. Pt failed swallow study, placement pending. Working with PT on mobilize pt to sitting in a chair to expand placement options    Interval History: NAEON. Pending placement. Per family discussion when medical staff are not present patient speaks in short sentences, mainly simple 1-3 word responses to questions. Also was reportedly caught eating chips family left on bedside table. Pt would not answer questions on interview today.      Objective:     Vital Signs (Most Recent):  Temp: 98.4 °F (36.9 °C) (05/10/24 1124)  Pulse: 108 (05/10/24 1124)  Resp: 20 (05/10/24 1124)  BP: 107/71 (05/10/24 1124)  SpO2: 98 % (05/10/24 1124) Vital Signs (24h Range):  Temp:  [98 °F (36.7 °C)-99.1 °F (37.3 °C)] 98.4 °F (36.9 °C)  Pulse:  [] 108  Resp:  [17-20] 20  SpO2:  [94 %-100 %] 98 %  BP: (107-138)/(59-88) 107/71     Weight: 122.5 kg (270 lb 1 oz)  Body mass index is 42.3 kg/m².    Intake/Output Summary (Last 24 hours) at 5/10/2024 1427  Last data filed at 5/10/2024 1114  Gross per 24 hour   Intake 279.3 ml   Output 2150 ml   Net -1870.7 ml         Physical Exam  Vitals and nursing note reviewed.   HENT:      Head: Normocephalic and atraumatic.   Neck:      Comments: Trach removed  Cardiovascular:      Rate and Rhythm: Normal rate and regular rhythm.      Heart sounds: Normal heart sounds.   Pulmonary:       Effort: Pulmonary effort is normal. No respiratory distress.      Breath sounds: Normal breath sounds.   Abdominal:      General: Abdomen is flat. There is no distension.      Palpations: Abdomen is soft.      Tenderness: There is no abdominal tenderness.      Comments: PEG tube   Musculoskeletal:      Right lower leg: No edema.      Left lower leg: No edema.      Comments: Moves UEs spontaneously   Skin:     General: Skin is warm and dry.   Neurological:      General: No focal deficit present.      Mental Status: She is alert.      Comments: Nonverbal, not following commands             Significant Labs: All pertinent labs within the past 24 hours have been reviewed.    Significant Imaging: I have reviewed all pertinent imaging results/findings within the past 24 hours.    Assessment/Plan:      * Acute cystitis with hematuria  resolved    Pt presenting with AMS and worsening lethargy. Pt is non-verbal at baseline, does not follow commands, but was less responsive than normal. Pt found to have a fever. Urinary source suspected based off of urine and CT abd/pelvis. Pt has many prior resistant bacterial infections including urinary sources. Has prior with sensitivity to zosyn and has also improved off ED dose of vanc/zosyn. Lactic elevated a 3.8->3.49 prior to completion of fluid bolus 500ml.    Patient with new fever and tachycardia on 4/25. Low threshold to initiate additional infectious workup and repeat UA.     Plan  S/p course of vanc/ertapenem per ID. Course completed 4/16.  Daily cbc  Contact precautions    *on contact precautions indefinitely while patient still makes urine given pt incontinent per infection control    Complication of vascular dialysis catheter  Cath intermittently clogging, not resolving with cath-hedy, going for IR venogram 4/30 with possible exchange. S/p exchange with IR on 4/30      Constipation  RESOLVED with Bowel regimen  Patient without BM x5d. One episode of vomitus night of  4/17. Some concern for bowel obstruction. Tolerating continuous tube feeds at goal rate with 0cc on residuals. Physical exam with bowel sounds, soft nontender abdomen. KUB without concern for obstruction with bowel gas, lack of transition point.    Patient now with regular bowel movements on bowel regimen.     Plan  - Miralax BID, Senna BID  - compazine PRN    Moderate malnutrition  Nutrition consulted. Most recent weight and BMI monitored-     Measurements:  Wt Readings from Last 1 Encounters:   05/08/24 122.5 kg (270 lb 1 oz)   Body mass index is 42.3 kg/m².    Patient has been screened and assessed by RD.    Malnutrition Type:  Context: acute illness or injury  Level: moderate    Malnutrition Characteristic Summary:  Weight Loss (Malnutrition): 10% in 6 months  Subcutaneous Fat (Malnutrition): mild depletion  Muscle Mass (Malnutrition): mild depletion  Fluid Accumulation (Malnutrition): mild    Interventions/Recommendations (treatment strategy):  1.    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 Physical therapy services has been scheduled. Continue all standard measures for pressure injury prevention and consult wound care for any wounds (chronic or acute).    ESRD (end stage renal disease) on dialysis  Creatine stable for now. BMP reviewed- noted Estimated Creatinine Clearance: 15.5 mL/min (A) (based on SCr of 5.7 mg/dL (H)). according to latest data. Based on current GFR, CKD stage is end stage.  Monitor UOP and serial BMP and adjust therapy as needed. Renally dose meds. Avoid nephrotoxic medications and  procedures.    -- HD MWF  -- nephro following  -- sevelamer TID    Status post tracheostomy  Resolved, decannulated 4/30    Acute encephalopathy  Pt is non-verbal and does not follow commands at baseline. Vascular Neurology consulted given acute change from baseline. MRI with concern for evolution of prior strokes with noted differential of T2 hyperintensities including vasculitis vs demyelination. Discussed with Vascular Neurology; low concern for ongoing vasculitis/demyelination, likely represents typical evolution of prior infarcts.    Patient at baseline as of 4/22.     Plan  - STAT head imaging for concern of new change in mental status/focal deficit    Type 2 diabetes mellitus with hyperglycemia, with long-term current use of insulin  Patient's FSGs are controlled on current medication regimen.  Last A1c reviewed-   Lab Results   Component Value Date    HGBA1C 10.4 (H) 01/30/2024     Most recent fingerstick glucose reviewed-   Recent Labs   Lab 05/09/24  1730 05/09/24  2202 05/10/24  0722 05/10/24  1159   POCTGLUCOSE 177* 159* 169* 188*       Current correctional scale  Medium  Maintain anti-hyperglycemic dose as follows-   Antihyperglycemics (From admission, onward)      Start     Stop Route Frequency Ordered    04/17/24 1200  insulin aspart U-100 pen 4 Units         -- SubQ Every 6 hours 04/17/24 0938    04/17/24 0944  insulin aspart U-100 pen 0-10 Units         -- SubQ Every 6 hours PRN 04/17/24 0938    04/13/24 2100  insulin detemir U-100 (Levemir) pen 27 Units         -- SubQ 2 times daily 04/13/24 0931          Hold Oral hypoglycemics while patient is in the hospital.  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 Pt underwent extensive course, currently still healing from this, but no longer has wound vac. Pt's wound currently covered with bandage. Picture in media tabs    Plan  Wound care        VTE Risk Mitigation (From admission,  onward)           Ordered     heparin (porcine) injection 3,000 Units  As needed (PRN)         04/17/24 0825     heparin (porcine) injection 1,000 Units  As needed (PRN)         04/12/24 0951     heparin (porcine) injection 7,500 Units  Every 8 hours         04/11/24 0252                    Discharge Planning   ZEKE: 5/17/2024     Code Status: Full Code   Is the patient medically ready for discharge?: No    Reason for patient still in hospital (select all that apply): Patient trending condition  Discharge Plan A: New Nursing Home placement - senior care care facility                  Osito Dos Santos MD  Department of Hospital Medicine   [facility] - Telemetry Stepdown

## 2024-05-10 NOTE — SUBJECTIVE & OBJECTIVE
Interval History: NAEON. Pending placement. Per family discussion when medical staff are not present patient speaks in short sentences, mainly simple 1-3 word responses to questions. Also was reportedly caught eating chips family left on bedside table. Pt would not answer questions on interview today.      Objective:     Vital Signs (Most Recent):  Temp: 98.4 °F (36.9 °C) (05/10/24 1124)  Pulse: 108 (05/10/24 1124)  Resp: 20 (05/10/24 1124)  BP: 107/71 (05/10/24 1124)  SpO2: 98 % (05/10/24 1124) Vital Signs (24h Range):  Temp:  [98 °F (36.7 °C)-99.1 °F (37.3 °C)] 98.4 °F (36.9 °C)  Pulse:  [] 108  Resp:  [17-20] 20  SpO2:  [94 %-100 %] 98 %  BP: (107-138)/(59-88) 107/71     Weight: 122.5 kg (270 lb 1 oz)  Body mass index is 42.3 kg/m².    Intake/Output Summary (Last 24 hours) at 5/10/2024 1427  Last data filed at 5/10/2024 1114  Gross per 24 hour   Intake 279.3 ml   Output 2150 ml   Net -1870.7 ml         Physical Exam  Vitals and nursing note reviewed.   HENT:      Head: Normocephalic and atraumatic.   Neck:      Comments: Trach removed  Cardiovascular:      Rate and Rhythm: Normal rate and regular rhythm.      Heart sounds: Normal heart sounds.   Pulmonary:      Effort: Pulmonary effort is normal. No respiratory distress.      Breath sounds: Normal breath sounds.   Abdominal:      General: Abdomen is flat. There is no distension.      Palpations: Abdomen is soft.      Tenderness: There is no abdominal tenderness.      Comments: PEG tube   Musculoskeletal:      Right lower leg: No edema.      Left lower leg: No edema.      Comments: Moves UEs spontaneously   Skin:     General: Skin is warm and dry.   Neurological:      General: No focal deficit present.      Mental Status: She is alert.      Comments: Nonverbal, not following commands             Significant Labs: All pertinent labs within the past 24 hours have been reviewed.    Significant Imaging: I have reviewed all pertinent imaging results/findings  within the past 24 hours.

## 2024-05-10 NOTE — ASSESSMENT & PLAN NOTE
Creatine stable for now. BMP reviewed- noted Estimated Creatinine Clearance: 15.5 mL/min (A) (based on SCr of 5.7 mg/dL (H)). according to latest data. Based on current GFR, CKD stage is end stage.  Monitor UOP and serial BMP and adjust therapy as needed. Renally dose meds. Avoid nephrotoxic medications and procedures.    -- HD MWF  -- nephro following  -- sevelamer TID

## 2024-05-10 NOTE — PROVIDER PROGRESS NOTES - EMERGENCY DEPT.
[facility] HEMODIALYSIS NOTE     Patient currently on hemodialysis for removal of uremic toxins .     Patient seen and evaluated on hemodialysis, tolerating treatment, see HD flowsheet for vitals and assessments.      No Hypotension, chest pain, shortness of breath, cramping, nausea or vomiting.     Target UF: 1.5 L  Continue EPO. Hgb 9.7.  Continue Sevelamer.   Awaiting placement.   Encouraged patient to limit fluid intake to 32 oz per day.   No lab stick/BP intake on access site  Continue to monitor intake and output, daily weights   Please avoid gadolinium, fleets, phos-based laxatives, NSAIDs  Will follow closely and continue dialysis treatments while in-patient    SHERLY Gregory DNP, APRN, FNP-C  Department of Nephrology  [facility]  Pager: [phone]

## 2024-05-10 NOTE — ASSESSMENT & PLAN NOTE
Patient's FSGs are controlled on current medication regimen.  Last A1c reviewed-   Lab Results   Component Value Date    HGBA1C 10.4 (H) 01/30/2024     Most recent fingerstick glucose reviewed-   Recent Labs   Lab 05/09/24  1730 05/09/24  2202 05/10/24  0722 05/10/24  1159   POCTGLUCOSE 177* 159* 169* 188*       Current correctional scale  Medium  Maintain anti-hyperglycemic dose as follows-   Antihyperglycemics (From admission, onward)    Start     Stop Route Frequency Ordered    04/17/24 1200  insulin aspart U-100 pen 4 Units         -- SubQ Every 6 hours 04/17/24 0938    04/17/24 0944  insulin aspart U-100 pen 0-10 Units         -- SubQ Every 6 hours PRN 04/17/24 0938    04/13/24 2100  insulin detemir U-100 (Levemir) pen 27 Units         -- SubQ 2 times daily 04/13/24 0931        Hold Oral hypoglycemics while patient is in the hospital.  POCT glucose q6h

## 2024-05-10 NOTE — PROGRESS NOTES
05/10/24 1038        Hemodialysis Catheter 04/30/24 1708 right internal jugular   Placement Date/Time: 04/30/24 1708   Present Prior to Hospital Arrival?: Yes  Hand Hygiene: Performed  Barrier Precautions: Performed  Skin Antisepsis: ChloraPrep  Hemodialysis Catheter Type: Tunneled catheter  Location: right internal jugular  Cathet...   Site Assessment No drainage   Line Securement Device Secured with sutureless device   Dressing Type CHG impregnated dressing/sponge   Date on Dressing 05/10/24   Dressing Due to be Changed 05/17/24   Venous Patency/Care deaccessed;flushed w/o difficulty;heparin locked   Arterial Patency/Care deaccessed;flushed w/o difficulty;heparin locked   During Hemodialysis Assessment   Blood Flow Rate (mL/min) 300 mL/min   Dialysate Flow Rate (mL/min) 700 ml/min   Ultrafiltration Rate (mL/Hr) 720 mL/Hr   Arteriovenous Lines Secure Yes   Arterial Pressure (mmHg) -150 mmHg   Venous Pressure (mmHg) 110   UF Removed (mL) 2150 mL   TMP 50   Venous Line in Air Detector Yes   Transducer Dry Yes   Access Visible Yes   Intra-Hemodialysis Comments HD completed   Post-Hemodialysis Assessment   Rinseback Volume (mL) 250 mL   Blood Volume Processed (Liters) 50.2 L   Dialyzer Clearance Moderately streaked   Duration of Treatment 180 minutes   Total UF (mL) 2150 mL   Net Fluid Removal 1500   Patient Response to Treatment refugio. well     Patient left ADAMS by bed NAD.

## 2024-05-10 NOTE — PLAN OF CARE
Problem: Infection  Goal: Absence of Infection Signs and Symptoms  Outcome: Progressing     Problem: Skin Injury Risk Increased  Goal: Skin Health and Integrity  Outcome: Progressing     Problem: Adult Inpatient Plan of Care  Goal: Plan of Care Review  Outcome: Progressing  Goal: Patient-Specific Goal (Individualized)  Outcome: Progressing  Goal: Absence of Hospital-Acquired Illness or Injury  Outcome: Progressing  Goal: Optimal Comfort and Wellbeing  Outcome: Progressing  Goal: Readiness for Transition of Care  Outcome: Progressing     Problem: Bariatric Environmental Safety  Goal: Safety Maintained with Care  Outcome: Progressing     Problem: Device-Related Complication Risk (Hemodialysis)  Goal: Safe, Effective Therapy Delivery  Outcome: Progressing     Problem: Diabetes Comorbidity  Goal: Blood Glucose Level Within Targeted Range  Outcome: Progressing     Problem: Infection Progression (Sepsis/Septic Shock)  Goal: Absence of Infection Signs and Symptoms  Outcome: Progressing     Problem: Adjustment to Illness (Chronic Kidney Disease)  Goal: Optimal Coping with Chronic Illness  Outcome: Progressing  Goal: Electrolyte Balance  Outcome: Progressing  Goal: Fluid Balance  Outcome: Progressing     Problem: Fluid Volume Excess (Chronic Kidney Disease)  Goal: Fluid Balance  Outcome: Progressing     Problem: Oral Intake Inadequate (Chronic Kidney Disease)  Goal: Optimal Oral Intake  Outcome: Progressing     Problem: Renal Function Impairment (Chronic Kidney Disease)  Goal: Minimize Renal Failure Effects  Outcome: Progressing     Problem: Restraint, Nonviolent  Goal: Absence of Harm or Injury  Outcome: Progressing     Problem: Wound  Goal: Optimal Coping  Outcome: Progressing  Goal: Optimal Functional Ability  Outcome: Progressing  Goal: Absence of Infection Signs and Symptoms  Outcome: Progressing  Goal: Improved Oral Intake  Outcome: Progressing  Goal: Optimal Pain Control and Function  Outcome: Progressing  Goal:  Skin Health and Integrity  Outcome: Progressing  Goal: Optimal Wound Healing  Outcome: Progressing

## 2024-05-10 NOTE — PT/OT/SLP PROGRESS
"Speech Language Pathology Treatment    Patient Name:  Sarah Saravia   MRN:  [id number]  Admitting Diagnosis: Acute cystitis with hematuria    Recommendations:                 General Recommendations:  Dysphagia therapy and Speech/language therapy  Diet recommendations:  NPO, Liquid Diet Level: NPO   Aspiration Precautions: Continue alternate means of nutrition, Frequent oral care, and Strict aspiration precautions   General Precautions: Standard, aphasia, aspiration, fall, NPO  Communication strategies:  inconsistently responds to simple yes/no questions; minimal verbal output;  go to room if call light pushed    Assessment:     Sarah Saravia is a 53 y.o. female with an SLP diagnosis of Aphasia, Dysphagia, and Cognitive-Linguistic Impairment.    Subjective     "Yeah"    Pain/Comfort:  Pain Rating 1:  (no indications of pain)    Respiratory Status: Room air    Objective:     Has the patient been evaluated by SLP for swallowing?   Yes  Keep patient NPO? Yes   Current Respiratory Status:        Pt seen for ongoing swallowing assessment with daughter present. Pt's daughter confirmed that pt had not undergone previous MBSS and was not targeting swallowing during recent LTAC stay. Pt's daughter reports pt obtained a chip from a bag of chips nearby the other day, which she reports she was able to chew and swallow.  Pt's daughter also reported intake of water recently.  Daughter denied presence of overt s/s of aspiration.  SLP provided education regarding ongoing swallowing assessment, attempts to conduct MBSS last week without success due to oral holding, inability to rule out aspiration at the bedside, overt s/s of aspiration, complications associated with aspiration, and recommendations for pt to remain NPO at this time. Pt's daughter expressed understanding.  During education to daughter, pt's nurse was present providing medications via PEG and flushing PEG.  Pt was agreeable to accepting PO trials after positioned " fully upright in bed. Pt received ice chip x 1, thin water via 1/2 tsp x 1, full tsp x 2, and straw sip x 1.  Throat clear present after ice chip. Pt exhibited holding of full tsp trial x 1.  Pt given straw sip for subsequent thin water trial in hopes intake via straw may promote more timely initiation of swallow.  Coughing episode present after straw sip.  During rest period following recovery from coughing, pt began vomiting what appeared to be medications and flushing recently administered by nurse.  Suspected coagulated tube feedings also present in emesis.  Nurse immediately notified.  SLP and MD present in room and assisted with managing emesis until pt recovered.  Session ended to allow pt be further tended to by nursing.  SLP emphasized recommendations to remain strictly NPO at this time due to inability to rule out aspiration.     Goals:   Multidisciplinary Problems       SLP Goals          Problem: SLP    Goal Priority Disciplines Outcome   SLP Goal     SLP    Description: Speech Language Pathology Goals  Updated goals expected to be met by 5/10 (goals remain appropriate - reassess on 5/17):  1. Pt will participate in ongoing swallowing assessment to determine if appropriate for PO trials for pleasure.   2. Pt will model single step command x 1 given max cues.   3. Pt will answer simple yes/no q's during a structured receptive language task x 1 given max cues.   4. Pt will phonate purposefully upon command x 1 given max cues.   5. Pt will attempt to vocalize/verbalize during automatic speech task x 1 given max cues.   6. Pt will participate in evaluation of ability to utilize basic communication board.     Goals expected to be met by 5/8:  1. Pt will participate in Modified Barium Swallow Study to determine if safe for oral intake. Goal met/attempted 5/2                               Plan:     Patient to be seen:  3 x/week   Plan of Care expires:  05/31/24  Plan of Care reviewed with:  patient, daughter    SLP Follow-Up:  Yes       Discharge recommendations:  Moderate Intensity Therapy       Time Tracking:     SLP Treatment Date:   05/10/24  Speech Start Time:  1207  Speech Stop Time:  1231     Speech Total Time (min):  24 min    Billable Minutes: Treatment Swallowing Dysfunction 10 and Self Care/Home Management Training 14    05/10/2024

## 2024-05-10 NOTE — PROGRESS NOTES
Nurses Note -- 4 Eyes      5/9/2024   7:27 PM      Skin assessed during: Q Shift Change      [] No Altered Skin Integrity Present    []Prevention Measures Documented      [x] Yes- Altered Skin Integrity Present or Discovered   [] LDA Added if Not in Epic (Describe Wound)   [] New Altered Skin Integrity was Present on Admit and Documented in LDA   [] Wound Image Taken    Wound Care Consulted? No    Attending Nurse:  Molly Eastman RN/Staff Member:  YANICK

## 2024-05-10 NOTE — NURSING
Received report from ELISABETH Barnes. Transport arrived to transfer pt to dialysis at time of report. Pt transferred off unit via bed.

## 2024-05-10 NOTE — PROGRESS NOTES
05/10/24 0730 05/10/24 0738        Hemodialysis Catheter 04/30/24 1708 right internal jugular   Placement Date/Time: 04/30/24 1708   Present Prior to Hospital Arrival?: Yes  Hand Hygiene: Performed  Barrier Precautions: Performed  Skin Antisepsis: ChloraPrep  Hemodialysis Catheter Type: Tunneled catheter  Location: right internal jugular  Cathet...   Site Assessment No drainage  --    Line Securement Device Secured with sutureless device  --    Dressing Type CHG impregnated dressing/sponge  --    Dressing Status   (no dressing on arrival)  --    Dressing Intervention Sterile dressing change  --    Dressing Due to be Changed 05/17/24  --    Venous Patency/Care accessed;blood return present;flushed w/o difficulty  --    Arterial Patency/Care accessed;blood return present;flushed w/o difficulty  --    During Hemodialysis Assessment   Blood Flow Rate (mL/min)  --  300 mL/min   Dialysate Flow Rate (mL/min)  --  700 ml/min   Ultrafiltration Rate (mL/Hr)  --  720 mL/Hr   Arteriovenous Lines Secure  --  Yes   Arterial Pressure (mmHg)  --  -150 mmHg   Venous Pressure (mmHg)  --  110   Blood Volume Processed (Liters)  --  0 L   UF Removed (mL)  --  0 mL   TMP  --  50   Venous Line in Air Detector  --  Yes   Intake (mL)  --  250 mL   Transducer Dry  --  Yes   Access Visible  --  Yes    notified of access issue?  --  N/A   Heparin given?  --  N/A   Intra-Hemodialysis Comments  --  HD started     Arrived [facility] by bed. Isolation HD started via right subclavian CVC.

## 2024-05-10 NOTE — PLAN OF CARE
[facility] - Telemetry Stepdown  Discharge Reassessment    Primary Care Provider: No, Primary Doctor    Expected Discharge Date: 5/13/2024    Reassessment (most recent)       Discharge Reassessment - 05/10/24 1220          Discharge Reassessment    Assessment Type Discharge Planning Reassessment     Did the patient's condition or plan change since previous assessment? No     Discharge Plan discussed with: Adult children     Communicated ZEKE with patient/caregiver Yes     Discharge Plan A New Nursing Home placement - shelter care facility     Discharge Plan B Home with family     DME Needed Upon Discharge  other (see comments)   TBD    Transition of Care Barriers DIalysis placement issues     Why the patient remains in the hospital Placement issues        Post-Acute Status    Post-Acute Authorization Dialysis     Post-Acute Placement Status Referrals Sent                   Discharge Plan A and Plan B have been determined by review of patient's clinical status, future medical and therapeutic needs, and coverage/benefits for post-acute care in coordination with multidisciplinary team members.     Patient has received den[facility] the following facilities:    [facility]  [facility]    Will continue to follow.    Sara Loredo RN  Ext [phone]

## 2024-05-11 LAB
POCT GLUCOSE: 156 MG/DL (ref 70–110)
POCT GLUCOSE: 162 MG/DL (ref 70–110)
POCT GLUCOSE: 174 MG/DL (ref 70–110)
POCT GLUCOSE: 175 MG/DL (ref 70–110)
POCT GLUCOSE: 183 MG/DL (ref 70–110)

## 2024-05-11 PROCEDURE — 25000003 PHARM REV CODE 250

## 2024-05-11 PROCEDURE — 27000207 HC ISOLATION

## 2024-05-11 PROCEDURE — 25000003 PHARM REV CODE 250: Performed by: STUDENT IN AN ORGANIZED HEALTH CARE EDUCATION/TRAINING PROGRAM

## 2024-05-11 PROCEDURE — 63600175 PHARM REV CODE 636 W HCPCS

## 2024-05-11 PROCEDURE — 20600001 HC STEP DOWN PRIVATE ROOM

## 2024-05-11 RX ADMIN — INSULIN ASPART 4 UNITS: 100 INJECTION, SOLUTION INTRAVENOUS; SUBCUTANEOUS at 12:05

## 2024-05-11 RX ADMIN — INSULIN ASPART 1 UNITS: 100 INJECTION, SOLUTION INTRAVENOUS; SUBCUTANEOUS at 06:05

## 2024-05-11 RX ADMIN — INSULIN ASPART 1 UNITS: 100 INJECTION, SOLUTION INTRAVENOUS; SUBCUTANEOUS at 12:05

## 2024-05-11 RX ADMIN — INSULIN ASPART 4 UNITS: 100 INJECTION, SOLUTION INTRAVENOUS; SUBCUTANEOUS at 06:05

## 2024-05-11 RX ADMIN — Medication 500 MG: at 09:05

## 2024-05-11 RX ADMIN — INSULIN DETEMIR 27 UNITS: 100 INJECTION, SOLUTION SUBCUTANEOUS at 09:05

## 2024-05-11 RX ADMIN — SEVELAMER CARBONATE 0.8 G: 2400 POWDER, FOR SUSPENSION ORAL at 09:05

## 2024-05-11 RX ADMIN — HEPARIN SODIUM 7500 UNITS: 5000 INJECTION INTRAVENOUS; SUBCUTANEOUS at 09:05

## 2024-05-11 RX ADMIN — HEPARIN SODIUM 7500 UNITS: 5000 INJECTION INTRAVENOUS; SUBCUTANEOUS at 02:05

## 2024-05-11 RX ADMIN — SODIUM CHLORIDE 1000 ML: 9 INJECTION, SOLUTION INTRAVENOUS at 08:05

## 2024-05-11 RX ADMIN — INSULIN ASPART 4 UNITS: 100 INJECTION, SOLUTION INTRAVENOUS; SUBCUTANEOUS at 05:05

## 2024-05-11 RX ADMIN — HEPARIN SODIUM 7500 UNITS: 5000 INJECTION INTRAVENOUS; SUBCUTANEOUS at 05:05

## 2024-05-11 RX ADMIN — METOPROLOL TARTRATE 25 MG: 25 TABLET, FILM COATED ORAL at 09:05

## 2024-05-11 RX ADMIN — ASPIRIN 81 MG CHEWABLE TABLET 81 MG: 81 TABLET CHEWABLE at 09:05

## 2024-05-11 RX ADMIN — ZINC SULFATE 220 MG (50 MG) CAPSULE 220 MG: CAPSULE at 09:05

## 2024-05-11 RX ADMIN — ATORVASTATIN CALCIUM 40 MG: 40 TABLET, FILM COATED ORAL at 09:05

## 2024-05-11 RX ADMIN — PANTOPRAZOLE SODIUM 40 MG: 40 GRANULE, DELAYED RELEASE ORAL at 09:05

## 2024-05-11 NOTE — NURSING
Pt experienced an episode of vomiting while Georgina NJ was at the bedside. Georgina NJ notified pt's nurse and stated for the nurse to administer pt's PRN zofran. RN administered pt's PRN zofran. Pt remains in bed with the call light in reach and the bed alarm on.

## 2024-05-11 NOTE — PLAN OF CARE
Problem: Infection  Goal: Absence of Infection Signs and Symptoms  Outcome: Progressing     Problem: Skin Injury Risk Increased  Goal: Skin Health and Integrity  Outcome: Progressing  Intervention: Optimize Skin Protection  Flowsheets (Taken 5/11/2024 0508)  Pressure Reduction Techniques:   frequent weight shift encouraged   weight shift assistance provided  Skin Protection: incontinence pads utilized     Problem: Adult Inpatient Plan of Care  Goal: Plan of Care Review  Outcome: Progressing  Flowsheets (Taken 5/11/2024 0508)  Plan of Care Reviewed With:   patient   family  Goal: Optimal Comfort and Wellbeing  Outcome: Progressing  Intervention: Provide Person-Centered Care  Flowsheets (Taken 5/11/2024 0508)  Trust Relationship/Rapport:   care explained   questions answered   thoughts/feelings acknowledged   choices provided   questions encouraged   emotional support provided   reassurance provided   empathic listening provided     Problem: Diabetes Comorbidity  Goal: Blood Glucose Level Within Targeted Range  Outcome: Progressing     Problem: Fall Injury Risk  Goal: Absence of Fall and Fall-Related Injury  Outcome: Progressing     Problem: Wound  Goal: Optimal Coping  Outcome: Progressing

## 2024-05-11 NOTE — SUBJECTIVE & OBJECTIVE
Interval History: NAEON. Pending PT OT and placement    Review of Systems  Objective:     Vital Signs (Most Recent):  Temp: 98.5 °F (36.9 °C) (05/11/24 1149)  Pulse: 91 (05/11/24 1149)  Resp: 18 (05/11/24 1149)  BP: 120/72 (05/11/24 1149)  SpO2: 95 % (05/11/24 1149) Vital Signs (24h Range):  Temp:  [98.4 °F (36.9 °C)-99.5 °F (37.5 °C)] 98.5 °F (36.9 °C)  Pulse:  [] 91  Resp:  [16-20] 18  SpO2:  [95 %-100 %] 95 %  BP: (105-135)/(70-87) 120/72     Weight: 122.5 kg (270 lb 1 oz)  Body mass index is 42.3 kg/m².    Intake/Output Summary (Last 24 hours) at 5/11/2024 1349  Last data filed at 5/11/2024 0830  Gross per 24 hour   Intake 700 ml   Output --   Net 700 ml         Physical Exam  Vitals and nursing note reviewed.   HENT:      Head: Normocephalic and atraumatic.   Neck:      Comments: Trach removed  Cardiovascular:      Rate and Rhythm: Normal rate and regular rhythm.      Heart sounds: Normal heart sounds.   Pulmonary:      Effort: Pulmonary effort is normal. No respiratory distress.      Breath sounds: Normal breath sounds.   Abdominal:      General: Abdomen is flat. There is no distension.      Palpations: Abdomen is soft.      Tenderness: There is no abdominal tenderness.      Comments: PEG tube   Musculoskeletal:      Right lower leg: No edema.      Left lower leg: No edema.      Comments: Moves UEs spontaneously   Skin:     General: Skin is warm and dry.   Neurological:      General: No focal deficit present.      Mental Status: She is alert.      Comments: Nonverbal, not following commands             Significant Labs: All pertinent labs within the past 24 hours have been reviewed.    Significant Imaging: I have reviewed all pertinent imaging results/findings within the past 24 hours.

## 2024-05-11 NOTE — PLAN OF CARE
Problem: Infection  Goal: Absence of Infection Signs and Symptoms  Outcome: Progressing     Problem: Skin Injury Risk Increased  Goal: Skin Health and Integrity  Outcome: Progressing     Problem: Adult Inpatient Plan of Care  Goal: Plan of Care Review  Outcome: Progressing  Goal: Patient-Specific Goal (Individualized)  Outcome: Progressing  Goal: Absence of Hospital-Acquired Illness or Injury  Outcome: Progressing  Goal: Optimal Comfort and Wellbeing  Outcome: Progressing  Goal: Readiness for Transition of Care  Outcome: Progressing     Problem: Bariatric Environmental Safety  Goal: Safety Maintained with Care  Outcome: Progressing     Problem: Device-Related Complication Risk (Hemodialysis)  Goal: Safe, Effective Therapy Delivery  Outcome: Progressing     Problem: Hemodynamic Instability (Hemodialysis)  Goal: Effective Tissue Perfusion  Outcome: Progressing     Problem: Infection (Hemodialysis)  Goal: Absence of Infection Signs and Symptoms  Outcome: Progressing     Problem: Diabetes Comorbidity  Goal: Blood Glucose Level Within Targeted Range  Outcome: Progressing     Problem: Adjustment to Illness (Sepsis/Septic Shock)  Goal: Optimal Coping  Outcome: Progressing     Problem: Glycemic Control Impaired (Sepsis/Septic Shock)  Goal: Blood Glucose Level Within Desired Range  Outcome: Progressing     Problem: Infection Progression (Sepsis/Septic Shock)  Goal: Absence of Infection Signs and Symptoms  Outcome: Progressing     Problem: Nutrition Impaired (Sepsis/Septic Shock)  Goal: Optimal Nutrition Intake  Outcome: Progressing     Problem: Fall Injury Risk  Goal: Absence of Fall and Fall-Related Injury  Outcome: Progressing     Problem: Adjustment to Illness (Chronic Kidney Disease)  Goal: Optimal Coping with Chronic Illness  Outcome: Progressing  Goal: Electrolyte Balance  Outcome: Progressing  Goal: Fluid Balance  Outcome: Progressing     Problem: Electrolyte Imbalance (Chronic Kidney Disease)  Goal: Electrolyte  Balance  Outcome: Progressing     Problem: Fluid Volume Excess (Chronic Kidney Disease)  Goal: Fluid Balance  Outcome: Progressing     Problem: Functional Decline (Chronic Kidney Disease)  Goal: Optimal Functional Ability  Outcome: Progressing     Problem: Hematologic Alteration (Chronic Kidney Disease)  Goal: Absence of Anemia Signs and Symptoms  Outcome: Progressing     Problem: Oral Intake Inadequate (Chronic Kidney Disease)  Goal: Optimal Oral Intake  Outcome: Progressing     Problem: Pain (Chronic Kidney Disease)  Goal: Acceptable Pain Control  Outcome: Progressing     Problem: Renal Function Impairment (Chronic Kidney Disease)  Goal: Minimize Renal Failure Effects  Outcome: Progressing     Problem: Restraint, Nonviolent  Goal: Absence of Harm or Injury  Outcome: Progressing     Problem: Wound  Goal: Optimal Coping  Outcome: Progressing  Goal: Optimal Functional Ability  Outcome: Progressing  Goal: Absence of Infection Signs and Symptoms  Outcome: Progressing  Goal: Improved Oral Intake  Outcome: Progressing  Goal: Optimal Pain Control and Function  Outcome: Progressing  Goal: Skin Health and Integrity  Outcome: Progressing  Goal: Optimal Wound Healing  Outcome: Progressing    Pt remained free of falls or injuries during shift. No other episodes of vomiting after pt received PRN zofran.

## 2024-05-11 NOTE — PLAN OF CARE
Problem: Adult Inpatient Plan of Care  Goal: Plan of Care Review  Outcome: Progressing  Goal: Patient-Specific Goal (Individualized)  Outcome: Progressing  Goal: Absence of Hospital-Acquired Illness or Injury  Outcome: Progressing  Goal: Optimal Comfort and Wellbeing  Outcome: Progressing  Goal: Readiness for Transition of Care  Outcome: Progressing     Problem: Device-Related Complication Risk (Hemodialysis)  Goal: Safe, Effective Therapy Delivery  Outcome: Progressing     Problem: Hemodynamic Instability (Hemodialysis)  Goal: Effective Tissue Perfusion  Outcome: Progressing     Problem: Diabetes Comorbidity  Goal: Blood Glucose Level Within Targeted Range  Outcome: Progressing  Intervention: Monitor and Manage Glycemia  Flowsheets (Taken 5/11/2024 1812)  Glycemic Management:   blood glucose monitored   supplemental insulin given     Problem: Adjustment to Illness (Sepsis/Septic Shock)  Goal: Optimal Coping  Outcome: Progressing     Problem: Glycemic Control Impaired (Sepsis/Septic Shock)  Goal: Blood Glucose Level Within Desired Range  Outcome: Progressing     Problem: Infection Progression (Sepsis/Septic Shock)  Goal: Absence of Infection Signs and Symptoms  Outcome: Progressing     Problem: Nutrition Impaired (Sepsis/Septic Shock)  Goal: Optimal Nutrition Intake  Outcome: Progressing     Problem: Fall Injury Risk  Goal: Absence of Fall and Fall-Related Injury  Outcome: Progressing  Intervention: Promote Injury-Free Environment  Flowsheets (Taken 5/11/2024 1812)  Safety Promotion/Fall Prevention:   assistive device/personal item within reach   bed alarm set   Fall Risk reviewed with patient/family   Fall Risk signage in place   nonskid shoes/socks when out of bed   instructed to call staff for mobility   side rails raised x 3     Problem: Adjustment to Illness (Chronic Kidney Disease)  Goal: Optimal Coping with Chronic Illness  Outcome: Progressing  Goal: Electrolyte Balance  Outcome: Progressing  Goal: Fluid  Balance  Outcome: Progressing     Problem: Electrolyte Imbalance (Chronic Kidney Disease)  Goal: Electrolyte Balance  Outcome: Progressing     Problem: Fluid Volume Excess (Chronic Kidney Disease)  Goal: Fluid Balance  Outcome: Progressing     Problem: Functional Decline (Chronic Kidney Disease)  Goal: Optimal Functional Ability  Outcome: Progressing     Problem: Oral Intake Inadequate (Chronic Kidney Disease)  Goal: Optimal Oral Intake  Outcome: Progressing     Problem: Renal Function Impairment (Chronic Kidney Disease)  Goal: Minimize Renal Failure Effects  Outcome: Progressing     Problem: Wound  Goal: Optimal Coping  Outcome: Progressing  Goal: Optimal Functional Ability  Outcome: Progressing  Goal: Absence of Infection Signs and Symptoms  Outcome: Progressing  Goal: Improved Oral Intake  Outcome: Progressing  Goal: Optimal Pain Control and Function  Outcome: Progressing  Goal: Skin Health and Integrity  Outcome: Progressing  Goal: Optimal Wound Healing  Outcome: Progressing     POC reviewed. Alert. Non-verbal. Tube-feed in progress. No acute changes. Wound care performed. Frequently safety checks. Bed in lowest position. Call in reach.

## 2024-05-11 NOTE — ASSESSMENT & PLAN NOTE
Patient's FSGs are controlled on current medication regimen.  Last A1c reviewed-   Lab Results   Component Value Date    HGBA1C 10.4 (H) 01/30/2024     Most recent fingerstick glucose reviewed-   Recent Labs   Lab 05/10/24  2150 05/11/24  0005 05/11/24  0559 05/11/24  1155   POCTGLUCOSE 177* 183* 174* 175*       Current correctional scale  Medium  Maintain anti-hyperglycemic dose as follows-   Antihyperglycemics (From admission, onward)    Start     Stop Route Frequency Ordered    04/17/24 1200  insulin aspart U-100 pen 4 Units         -- SubQ Every 6 hours 04/17/24 0938    04/17/24 0944  insulin aspart U-100 pen 0-10 Units         -- SubQ Every 6 hours PRN 04/17/24 0938    04/13/24 2100  insulin detemir U-100 (Levemir) pen 27 Units         -- SubQ 2 times daily 04/13/24 0931        Hold Oral hypoglycemics while patient is in the hospital.  POCT glucose q6h

## 2024-05-11 NOTE — PROGRESS NOTES
[facility] - Telemetry 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 History was given from patient's daughter. She was undergoing dialysis today and noticed she was lethargic, less alert than usual self. Pt completed dialysis and still not acting herself. EMS was called, fever of a 100.  On chart review, she did have an episode of large volume emesis around 1700.  Per EMS, she had a slightly elevated temp 100.0°F, glucose 300s.     In the ED: UA 2+ leuks, >100 WBC, many bacteria, WBC 17, CT abd/pelvis concerning for cystitis. Given vanc/zosyn    Overview/Hospital Course:  Patient was admitted to Hospital Medicine service for medical management and evaluation of urosepsis. Patient was continued on vanc/zosyn. Vascular Neurology consulted concerning mental status change with the recommendation to initiate ASA 81 QD and to obtain an MRI to r/o new stroke. Imaging pending. Nephrology was consulted for regularly scheduled dialysis. Afternoon of 4/12, patient was unable to tolerate filtration of volume during dialsis 2/2 hypotension. Patient received 500cc bolus and was transferred back to floor after finishing the session without volume removed. Will monitor for signs of volume overload. Tailoring insulin regimen while uptitrating tube feeds to goal rate. Staph Epi in all 4 bottles; ID with recommendation to continue Vanc & de-escalate meropenem to ertapenem on 04/15 through 4/16. Patient completed IV course of UTI coverage. Patient tolerated volume removal well in dialysis  throughout the rest of her hospital stay. Pending discharge to LTACH pending bed availability. Patient without BM with one episode of vomiting overnight 4/17. KUB with bowel gas and low concern for obstruction with no transition point noted. Escalating bowel regimen. Pending placement as of 4/22. Pulm consult placed to evaluate for possible trach downsize & eventual decannulation to assist placement if safe. Trach capping trial per pulm for 48h and will assess for decannulation on Monday. DVT studies negative. Pulm successfully decannulated pt 4/30, IR exchanged TDC. Pending swallow study with SLP and waiting for dispo options. Pt failed swallow study, placement pending. Working with PT on mobilize pt to sitting in a chair to expand placement options    Interval History: NAEON. Pending PT OT and placement    Review of Systems  Objective:     Vital Signs (Most Recent):  Temp: 98.5 °F (36.9 °C) (05/11/24 1149)  Pulse: 91 (05/11/24 1149)  Resp: 18 (05/11/24 1149)  BP: 120/72 (05/11/24 1149)  SpO2: 95 % (05/11/24 1149) Vital Signs (24h Range):  Temp:  [98.4 °F (36.9 °C)-99.5 °F (37.5 °C)] 98.5 °F (36.9 °C)  Pulse:  [] 91  Resp:  [16-20] 18  SpO2:  [95 %-100 %] 95 %  BP: (105-135)/(70-87) 120/72     Weight: 122.5 kg (270 lb 1 oz)  Body mass index is 42.3 kg/m².    Intake/Output Summary (Last 24 hours) at 5/11/2024 1349  Last data filed at 5/11/2024 0830  Gross per 24 hour   Intake 700 ml   Output --   Net 700 ml         Physical Exam  Vitals and nursing note reviewed.   HENT:      Head: Normocephalic and atraumatic.   Neck:      Comments: Trach removed  Cardiovascular:      Rate and Rhythm: Normal rate and regular rhythm.      Heart sounds: Normal heart sounds.   Pulmonary:      Effort: Pulmonary effort is normal. No respiratory distress.      Breath sounds: Normal breath sounds.   Abdominal:      General: Abdomen is flat. There is no distension.      Palpations: Abdomen is soft.      Tenderness: There is no  abdominal tenderness.      Comments: PEG tube   Musculoskeletal:      Right lower leg: No edema.      Left lower leg: No edema.      Comments: Moves UEs spontaneously   Skin:     General: Skin is warm and dry.   Neurological:      General: No focal deficit present.      Mental Status: She is alert.      Comments: Nonverbal, not following commands             Significant Labs: All pertinent labs within the past 24 hours have been reviewed.    Significant Imaging: I have reviewed all pertinent imaging results/findings within the past 24 hours.    Assessment/Plan:      * Acute cystitis with hematuria  resolved    Pt presenting with AMS and worsening lethargy. Pt is non-verbal at baseline, does not follow commands, but was less responsive than normal. Pt found to have a fever. Urinary source suspected based off of urine and CT abd/pelvis. Pt has many prior resistant bacterial infections including urinary sources. Has prior with sensitivity to zosyn and has also improved off ED dose of vanc/zosyn. Lactic elevated a 3.8->3.49 prior to completion of fluid bolus 500ml.    Patient with new fever and tachycardia on 4/25. Low threshold to initiate additional infectious workup and repeat UA.     Plan  S/p course of vanc/ertapenem per ID. Course completed 4/16.  Daily cbc  Contact precautions    *on contact precautions indefinitely while patient still makes urine given pt incontinent per infection control    Complication of vascular dialysis catheter  Cath intermittently clogging, not resolving with cath-hedy, going for IR venogram 4/30 with possible exchange. S/p exchange with IR on 4/30      Constipation  RESOLVED with Bowel regimen  Patient without BM x5d. One episode of vomitus night of 4/17. Some concern for bowel obstruction. Tolerating continuous tube feeds at goal rate with 0cc on residuals. Physical exam with bowel sounds, soft nontender abdomen. KUB without concern for obstruction with bowel gas, lack of transition  point.    Patient now with regular bowel movements on bowel regimen.     Plan  - Miralax BID, Senna BID  - compazine PRN    Moderate malnutrition  Nutrition consulted. Most recent weight and BMI monitored-     Measurements:  Wt Readings from Last 1 Encounters:   05/08/24 122.5 kg (270 lb 1 oz)   Body mass index is 42.3 kg/m².    Patient has been screened and assessed by RD.    Malnutrition Type:  Context: acute illness or injury  Level: moderate    Malnutrition Characteristic Summary:  Weight Loss (Malnutrition): 10% in 6 months  Subcutaneous Fat (Malnutrition): mild depletion  Muscle Mass (Malnutrition): mild depletion  Fluid Accumulation (Malnutrition): mild    Interventions/Recommendations (treatment strategy):  1.    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 Physical therapy services has been scheduled. Continue all standard measures for pressure injury prevention and consult wound care for any wounds (chronic or acute).    ESRD (end stage renal disease) on dialysis  Creatine stable for now. BMP reviewed- noted Estimated Creatinine Clearance: 15.5 mL/min (A) (based on SCr of 5.7 mg/dL (H)). according to latest data. Based on current GFR, CKD stage is end stage.  Monitor UOP and serial BMP and adjust therapy as needed. Renally dose meds. Avoid nephrotoxic medications and procedures.    -- HD MWF  -- nephro following  -- sevelamer TID    Status post tracheostomy  Resolved, decannulated 4/30    Acute encephalopathy  Pt is non-verbal and does not follow commands at baseline. Vascular Neurology consulted given  acute change from baseline. MRI with concern for evolution of prior strokes with noted differential of T2 hyperintensities including vasculitis vs demyelination. Discussed with Vascular Neurology; low concern for ongoing vasculitis/demyelination, likely represents typical evolution of prior infarcts.    Patient at baseline as of 4/22.     Plan  - STAT head imaging for concern of new change in mental status/focal deficit    Type 2 diabetes mellitus with hyperglycemia, with long-term current use of insulin  Patient's FSGs are controlled on current medication regimen.  Last A1c reviewed-   Lab Results   Component Value Date    HGBA1C 10.4 (H) 01/30/2024     Most recent fingerstick glucose reviewed-   Recent Labs   Lab 05/10/24  2150 05/11/24  0005 05/11/24  0559 05/11/24  1155   POCTGLUCOSE 177* 183* 174* 175*       Current correctional scale  Medium  Maintain anti-hyperglycemic dose as follows-   Antihyperglycemics (From admission, onward)      Start     Stop Route Frequency Ordered    04/17/24 1200  insulin aspart U-100 pen 4 Units         -- SubQ Every 6 hours 04/17/24 0938    04/17/24 0944  insulin aspart U-100 pen 0-10 Units         -- SubQ Every 6 hours PRN 04/17/24 0938    04/13/24 2100  insulin detemir U-100 (Levemir) pen 27 Units         -- SubQ 2 times daily 04/13/24 0931          Hold Oral hypoglycemics while patient is in the hospital.  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 Pt underwent extensive course, currently still healing from this, but no longer has wound vac. Pt's wound currently covered with bandage. Picture in media tabs    Plan  Wound care        VTE Risk Mitigation (From admission, onward)           Ordered     heparin (porcine) injection 3,000 Units  As needed (PRN)         04/17/24 0825     heparin (porcine) injection 1,000 Units  As needed (PRN)         04/12/24 0951     heparin (porcine) injection 7,500 Units  Every  8 hours         04/11/24 0252                    Discharge Planning   ZEKE: 5/17/2024     Code Status: Full Code   Is the patient medically ready for discharge?: No    Reason for patient still in hospital (select all that apply): Patient trending condition  Discharge Plan A: New Nursing Home placement - group home care facility                  Osito Dos Santos MD  Department of Hospital Medicine   [facility] - Telemetry Stepdown

## 2024-05-12 LAB
POCT GLUCOSE: 166 MG/DL (ref 70–110)
POCT GLUCOSE: 169 MG/DL (ref 70–110)
POCT GLUCOSE: 170 MG/DL (ref 70–110)
POCT GLUCOSE: 176 MG/DL (ref 70–110)
POCT GLUCOSE: 180 MG/DL (ref 70–110)

## 2024-05-12 PROCEDURE — 63600175 PHARM REV CODE 636 W HCPCS

## 2024-05-12 PROCEDURE — 25000003 PHARM REV CODE 250

## 2024-05-12 PROCEDURE — 27000207 HC ISOLATION

## 2024-05-12 PROCEDURE — 20600001 HC STEP DOWN PRIVATE ROOM

## 2024-05-12 RX ADMIN — PANTOPRAZOLE SODIUM 40 MG: 40 GRANULE, DELAYED RELEASE ORAL at 11:05

## 2024-05-12 RX ADMIN — ASPIRIN 81 MG CHEWABLE TABLET 81 MG: 81 TABLET CHEWABLE at 11:05

## 2024-05-12 RX ADMIN — HEPARIN SODIUM 7500 UNITS: 5000 INJECTION INTRAVENOUS; SUBCUTANEOUS at 09:05

## 2024-05-12 RX ADMIN — Medication 500 MG: at 11:05

## 2024-05-12 RX ADMIN — METOPROLOL TARTRATE 25 MG: 25 TABLET, FILM COATED ORAL at 11:05

## 2024-05-12 RX ADMIN — INSULIN ASPART 4 UNITS: 100 INJECTION, SOLUTION INTRAVENOUS; SUBCUTANEOUS at 05:05

## 2024-05-12 RX ADMIN — INSULIN ASPART 4 UNITS: 100 INJECTION, SOLUTION INTRAVENOUS; SUBCUTANEOUS at 11:05

## 2024-05-12 RX ADMIN — METOPROLOL TARTRATE 25 MG: 25 TABLET, FILM COATED ORAL at 09:05

## 2024-05-12 RX ADMIN — INSULIN DETEMIR 27 UNITS: 100 INJECTION, SOLUTION SUBCUTANEOUS at 09:05

## 2024-05-12 RX ADMIN — ATORVASTATIN CALCIUM 40 MG: 40 TABLET, FILM COATED ORAL at 11:05

## 2024-05-12 RX ADMIN — INSULIN ASPART 1 UNITS: 100 INJECTION, SOLUTION INTRAVENOUS; SUBCUTANEOUS at 12:05

## 2024-05-12 RX ADMIN — HEPARIN SODIUM 7500 UNITS: 5000 INJECTION INTRAVENOUS; SUBCUTANEOUS at 05:05

## 2024-05-12 RX ADMIN — HEPARIN SODIUM 7500 UNITS: 5000 INJECTION INTRAVENOUS; SUBCUTANEOUS at 02:05

## 2024-05-12 RX ADMIN — ZINC SULFATE 220 MG (50 MG) CAPSULE 220 MG: CAPSULE at 11:05

## 2024-05-12 RX ADMIN — INSULIN ASPART 4 UNITS: 100 INJECTION, SOLUTION INTRAVENOUS; SUBCUTANEOUS at 12:05

## 2024-05-12 RX ADMIN — INSULIN DETEMIR 27 UNITS: 100 INJECTION, SOLUTION SUBCUTANEOUS at 11:05

## 2024-05-12 RX ADMIN — INSULIN ASPART 4 UNITS: 100 INJECTION, SOLUTION INTRAVENOUS; SUBCUTANEOUS at 06:05

## 2024-05-12 RX ADMIN — INSULIN ASPART 1 UNITS: 100 INJECTION, SOLUTION INTRAVENOUS; SUBCUTANEOUS at 05:05

## 2024-05-12 RX ADMIN — Medication 500 MG: at 09:05

## 2024-05-12 NOTE — NURSING
Nurses Note -- 4 Eyes      5/11/2024   9:36 PM      Skin assessed during: Q Shift Change      [] No Altered Skin Integrity Present    []Prevention Measures Documented      [x] Yes- Altered Skin Integrity Present or Discovered   [] LDA Added if Not in Epic (Describe Wound)   [] New Altered Skin Integrity was Present on Admit and Documented in LDA   [] Wound Image Taken    Wound Care Consulted? No    Attending Nurse:  Keya Eastman RN/Staff Member:  Chelsi

## 2024-05-12 NOTE — ASSESSMENT & PLAN NOTE
Patient's FSGs are controlled on current medication regimen.  Last A1c reviewed-   Lab Results   Component Value Date    HGBA1C 10.4 (H) 01/30/2024     Most recent fingerstick glucose reviewed-   Recent Labs   Lab 05/11/24  2158 05/12/24  0011 05/12/24  0557 05/12/24  1136   POCTGLUCOSE 162* 170* 169* 166*     Current correctional scale  Medium  Maintain anti-hyperglycemic dose as follows-   Antihyperglycemics (From admission, onward)      Start     Stop Route Frequency Ordered    04/17/24 1200  insulin aspart U-100 pen 4 Units         -- SubQ Every 6 hours 04/17/24 0938    04/13/24 2100  insulin detemir U-100 (Levemir) pen 27 Units         -- SubQ 2 times daily 04/13/24 0931          Hold Oral hypoglycemics while patient is in the hospital.  POCT glucose q6h

## 2024-05-12 NOTE — PROGRESS NOTES
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 History was given from patient's daughter. She was undergoing dialysis today and noticed she was lethargic, less alert than usual self. Pt completed dialysis and still not acting herself. EMS was called, fever of a 100.  On chart review, she did have an episode of large volume emesis around 1700.  Per EMS, she had a slightly elevated temp 100.0°F, glucose 300s.     In the ED: UA 2+ leuks, >100 WBC, many bacteria, WBC 17, CT abd/pelvis concerning for cystitis. Given vanc/zosyn    Overview/Hospital Course:  Patient was admitted to Hospital Medicine service for medical management and evaluation of urosepsis. Patient was continued on vanc/zosyn. Vascular Neurology consulted concerning mental status change with the recommendation to initiate ASA 81 QD and to obtain an MRI to r/o new stroke. Imaging pending. Nephrology was consulted for regularly scheduled dialysis. Afternoon of 4/12, patient was unable to tolerate filtration of volume during dialsis 2/2 hypotension. Patient received 500cc bolus and was transferred back to floor after finishing the session without volume removed. Will monitor for signs of volume overload. Tailoring insulin regimen while uptitrating tube feeds to goal rate. Staph Epi in all 4 bottles; ID with recommendation to continue Vanc & de-escalate meropenem to ertapenem on 04/15 through 4/16. Patient completed IV course of UTI coverage. Patient tolerated volume removal well in dialysis  throughout the rest of her hospital stay. Pending discharge to LTACH pending bed availability. Patient without BM with one episode of vomiting overnight 4/17. KUB with bowel gas and low concern for obstruction with no transition point noted. Escalating bowel regimen. Pending placement as of 4/22. Pulm consult placed to evaluate for possible trach downsize & eventual decannulation to assist placement if safe. Trach capping trial per pulm for 48h and will assess for decannulation on Monday. DVT studies negative. Pulm successfully decannulated pt 4/30, IR exchanged TDC. Pending swallow study with SLP and waiting for dispo options. Pt failed swallow study, placement pending. Working with PT on mobilize pt to sitting in a chair to expand placement options    Interval History: No acute events overnight, afebrile, hemodynamically stable. Pending PT/OT and placement.       Review of Systems   Unable to perform ROS: Patient nonverbal     Objective:     Vital Signs (Most Recent):  Temp: 98.7 °F (37.1 °C) (05/12/24 0517)  Pulse: 96 (05/12/24 0517)  Resp: 17 (05/12/24 0517)  BP: 125/79 (05/12/24 0517)  SpO2: 99 % (05/12/24 0517) Vital Signs (24h Range):  Temp:  [98.3 °F (36.8 °C)-99.2 °F (37.3 °C)] 98.7 °F (37.1 °C)  Pulse:  [] 96  Resp:  [17-18] 17  SpO2:  [99 %-100 %] 99 %  BP: (114-137)/(72-86) 125/79     Weight: 122.5 kg (270 lb 1 oz)  Body mass index is 42.3 kg/m².    Intake/Output Summary (Last 24 hours) at 5/12/2024 1224  Last data filed at 5/11/2024 2000  Gross per 24 hour   Intake 940 ml   Output --   Net 940 ml         Physical Exam  Vitals and nursing note reviewed.   HENT:      Head: Normocephalic and atraumatic.   Cardiovascular:      Rate and Rhythm: Normal rate and regular rhythm.      Heart sounds: Normal heart sounds.   Pulmonary:      Effort: Pulmonary effort is normal. No respiratory distress.      Breath sounds: Normal breath sounds.   Abdominal:      General: Bowel sounds are normal. There is no  distension.      Palpations: Abdomen is soft.      Tenderness: There is no abdominal tenderness. There is no guarding or rebound.      Comments: PEG tube   Musculoskeletal:      Right lower leg: No edema.      Left lower leg: No edema.      Comments: Moves upper extremities spontaneously   Skin:     General: Skin is warm and dry.   Neurological:      General: No focal deficit present.      Mental Status: She is alert.      Comments: Nonverbal, not following commands             Significant Labs: All pertinent labs within the past 24 hours have been reviewed.  LABS:  Recent Labs   Lab 05/06/24  1703 05/08/24  0445 05/10/24  0657   * 127* 127*   K 3.7 4.0 3.8   CL 92* 91* 90*   CO2 24 20* 25   BUN 28* 50* 46*   CREATININE 3.6* 6.0* 5.7*   * 148* 160*   ANIONGAP 13 16 12     Recent Labs   Lab 05/06/24  0219 05/08/24  0445   PHOS 4.3 4.4     Recent Labs   Lab 05/06/24  0219 05/08/24  0445   ALBUMIN 3.2* 3.2*     POCT Glucose:   Recent Labs   Lab 05/12/24  0011 05/12/24  0557 05/12/24  1136   POCTGLUCOSE 170* 169* 166*    Recent Labs   Lab 05/07/24  1028 05/10/24  0657   WBC 9.61 10.73   HGB 10.5* 9.7*   HCT 34.3* 31.6*    333   GRAN 54.7  5.3 58.3  6.3          Significant Imaging: I have reviewed all pertinent imaging results/findings within the past 24 hours.      Inpatient Medications:  Continuous Infusions:   dextrose 10 % in water (D10W)   Intravenous Continuous PRN         Scheduled Meds:   ascorbic acid (vitamin C)  500 mg Per G Tube BID    aspirin  81 mg Per G Tube Daily    atorvastatin  40 mg Per G Tube Daily    epoetin merry (PROCRIT) injection  50 Units/kg Intravenous Every Mon, Wed, Fri    heparin (porcine)  7,500 Units Subcutaneous Q8H    insulin aspart U-100  4 Units Subcutaneous Q6H    insulin detemir U-100 (Levemir)  27 Units Subcutaneous BID    metoprolol tartrate  25 mg Per G Tube BID    pantoprazole  40 mg Per G Tube Daily    psyllium husk (aspartame)  1 packet Per G Tube Daily     zinc sulfate  220 mg Per G Tube Daily     PRN Meds:  Current Facility-Administered Medications:     dextrose 10 % in water (D10W), , Intravenous, Continuous PRN    dextrose 10%, 12.5 g, Intravenous, PRN    dextrose 10%, 25 g, Intravenous, PRN    diphenhydrAMINE-zinc acetate 2-0.1%, , Topical (Top), TID PRN    glucagon (human recombinant), 1 mg, Intramuscular, PRN    glucose, 16 g, Oral, PRN    glucose, 24 g, Oral, PRN    heparin (porcine), 1,000 Units, Intra-Catheter, PRN    heparin (porcine), 3,000 Units, Intravenous, PRN    naloxone, 0.02 mg, Intravenous, PRN    ondansetron, 4 mg, Intravenous, Q6H PRN    sodium chloride 0.9%, 10 mL, Intravenous, Q12H PRN      Assessment/Plan:      * Acute cystitis with hematuria  resolved    Pt presenting with AMS and worsening lethargy. Pt is non-verbal at baseline, does not follow commands, but was less responsive than normal. Pt found to have a fever. Urinary source suspected based off of urine and CT abd/pelvis. Pt has many prior resistant bacterial infections including urinary sources. Has prior with sensitivity to zosyn and has also improved off ED dose of vanc/zosyn. Lactic elevated a 3.8->3.49 prior to completion of fluid bolus 500ml.    Patient with new fever and tachycardia on 4/25. Low threshold to initiate additional infectious workup and repeat UA.     Plan  S/p course of vanc/ertapenem per ID. Course completed 4/16.  Daily cbc  Contact precautions    *on contact precautions indefinitely while patient still makes urine given pt incontinent per infection control    Complication of vascular dialysis catheter  Cath intermittently clogging, not resolving with cath-hedy, going for IR venogram 4/30 with possible exchange. S/p exchange with IR on 4/30      Constipation  RESOLVED with Bowel regimen  Patient without BM x5d. One episode of vomitus night of 4/17. Some concern for bowel obstruction. Tolerating continuous tube feeds at goal rate with 0cc on residuals. Physical exam  with bowel sounds, soft nontender abdomen. KUB without concern for obstruction with bowel gas, lack of transition point.    Patient now with regular bowel movements on bowel regimen.     Plan  - Miralax BID, Senna BID  - compazine PRN    Moderate malnutrition  Nutrition consulted. Most recent weight and BMI monitored-     Measurements:  Wt Readings from Last 1 Encounters:   05/08/24 122.5 kg (270 lb 1 oz)   Body mass index is 42.3 kg/m².    Patient has been screened and assessed by RD.    Malnutrition Type:  Context: acute illness or injury  Level: moderate    Malnutrition Characteristic Summary:  Weight Loss (Malnutrition): 10% in 6 months  Subcutaneous Fat (Malnutrition): mild depletion  Muscle Mass (Malnutrition): mild depletion  Fluid Accumulation (Malnutrition): mild    Interventions/Recommendations (treatment strategy):  1.    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 Physical therapy services has been scheduled. Continue all standard measures for pressure injury prevention and consult wound care for any wounds (chronic or acute).    ESRD (end stage renal disease) on dialysis  Creatine stable for now. BMP reviewed- noted Estimated Creatinine Clearance: 15.5 mL/min (A) (based on SCr of 5.7 mg/dL (H)). according to latest data. Based on current GFR, CKD stage is end stage.  Monitor UOP and serial BMP and adjust therapy as needed. Renally dose meds. Avoid nephrotoxic medications and procedures.    -- HD MWF  -- nephro following    Status post tracheostomy  Resolved, decannulated 4/30    Acute  encephalopathy  Pt is non-verbal and does not follow commands at baseline. Vascular Neurology consulted given acute change from baseline. MRI with concern for evolution of prior strokes with noted differential of T2 hyperintensities including vasculitis vs demyelination. Discussed with Vascular Neurology; low concern for ongoing vasculitis/demyelination, likely represents typical evolution of prior infarcts.    Patient at baseline as of 4/22.     Plan  - STAT head imaging for concern of new change in mental status/focal deficit    Type 2 diabetes mellitus with hyperglycemia, with long-term current use of insulin  Patient's FSGs are controlled on current medication regimen.  Last A1c reviewed-   Lab Results   Component Value Date    HGBA1C 10.4 (H) 01/30/2024     Most recent fingerstick glucose reviewed-   Recent Labs   Lab 05/11/24  2158 05/12/24  0011 05/12/24  0557 05/12/24  1136   POCTGLUCOSE 162* 170* 169* 166*     Current correctional scale  Medium  Maintain anti-hyperglycemic dose as follows-   Antihyperglycemics (From admission, onward)      Start     Stop Route Frequency Ordered    04/17/24 1200  insulin aspart U-100 pen 4 Units         -- SubQ Every 6 hours 04/17/24 0938    04/13/24 2100  insulin detemir U-100 (Levemir) pen 27 Units         -- SubQ 2 times daily 04/13/24 0931          Hold Oral hypoglycemics while patient is in the hospital.  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 Pt underwent extensive course, currently still healing from this, but no longer has wound vac. Pt's wound currently covered with bandage. Picture in media tabs    Plan  Wound care        VTE Risk Mitigation (From admission, onward)           Ordered     heparin (porcine) injection 3,000 Units  As needed (PRN)         04/17/24 0825     heparin (porcine) injection 1,000 Units  As needed (PRN)         04/12/24 0951     heparin (porcine) injection 7,500 Units   Every 8 hours         04/11/24 0252                    Discharge Planning   ZEKE: 5/17/2024     Code Status: Full Code   Is the patient medically ready for discharge?: No    Reason for patient still in hospital (select all that apply): PT / OT recommendations and Pending disposition  Discharge Plan A: New Nursing Home placement - residential care facility                  Atr Glynn DO  Department of Hospital Medicine   [facility] - [facility]

## 2024-05-12 NOTE — SUBJECTIVE & OBJECTIVE
Interval History: No acute events overnight, afebrile, hemodynamically stable. Pending PT/OT and placement.       Review of Systems   Unable to perform ROS: Patient nonverbal     Objective:     Vital Signs (Most Recent):  Temp: 98.7 °F (37.1 °C) (05/12/24 0517)  Pulse: 96 (05/12/24 0517)  Resp: 17 (05/12/24 0517)  BP: 125/79 (05/12/24 0517)  SpO2: 99 % (05/12/24 0517) Vital Signs (24h Range):  Temp:  [98.3 °F (36.8 °C)-99.2 °F (37.3 °C)] 98.7 °F (37.1 °C)  Pulse:  [] 96  Resp:  [17-18] 17  SpO2:  [99 %-100 %] 99 %  BP: (114-137)/(72-86) 125/79     Weight: 122.5 kg (270 lb 1 oz)  Body mass index is 42.3 kg/m².    Intake/Output Summary (Last 24 hours) at 5/12/2024 1224  Last data filed at 5/11/2024 2000  Gross per 24 hour   Intake 940 ml   Output --   Net 940 ml         Physical Exam  Vitals and nursing note reviewed.   HENT:      Head: Normocephalic and atraumatic.   Cardiovascular:      Rate and Rhythm: Normal rate and regular rhythm.      Heart sounds: Normal heart sounds.   Pulmonary:      Effort: Pulmonary effort is normal. No respiratory distress.      Breath sounds: Normal breath sounds.   Abdominal:      General: Bowel sounds are normal. There is no distension.      Palpations: Abdomen is soft.      Tenderness: There is no abdominal tenderness. There is no guarding or rebound.      Comments: PEG tube   Musculoskeletal:      Right lower leg: No edema.      Left lower leg: No edema.      Comments: Moves upper extremities spontaneously   Skin:     General: Skin is warm and dry.   Neurological:      General: No focal deficit present.      Mental Status: She is alert.      Comments: Nonverbal, not following commands             Significant Labs: All pertinent labs within the past 24 hours have been reviewed.  LABS:  Recent Labs   Lab 05/06/24  1703 05/08/24  0445 05/10/24  0657   * 127* 127*   K 3.7 4.0 3.8   CL 92* 91* 90*   CO2 24 20* 25   BUN 28* 50* 46*   CREATININE 3.6* 6.0* 5.7*   * 148*  160*   ANIONGAP 13 16 12     Recent Labs   Lab 05/06/24  0219 05/08/24  0445   PHOS 4.3 4.4     Recent Labs   Lab 05/06/24  0219 05/08/24  0445   ALBUMIN 3.2* 3.2*     POCT Glucose:   Recent Labs   Lab 05/12/24  0011 05/12/24  0557 05/12/24  1136   POCTGLUCOSE 170* 169* 166*    Recent Labs   Lab 05/07/24  1028 05/10/24  0657   WBC 9.61 10.73   HGB 10.5* 9.7*   HCT 34.3* 31.6*    333   GRAN 54.7  5.3 58.3  6.3          Significant Imaging: I have reviewed all pertinent imaging results/findings within the past 24 hours.      Inpatient Medications:  Continuous Infusions:   dextrose 10 % in water (D10W)   Intravenous Continuous PRN         Scheduled Meds:   ascorbic acid (vitamin C)  500 mg Per G Tube BID    aspirin  81 mg Per G Tube Daily    atorvastatin  40 mg Per G Tube Daily    epoetin merry (PROCRIT) injection  50 Units/kg Intravenous Every Mon, Wed, Fri    heparin (porcine)  7,500 Units Subcutaneous Q8H    insulin aspart U-100  4 Units Subcutaneous Q6H    insulin detemir U-100 (Levemir)  27 Units Subcutaneous BID    metoprolol tartrate  25 mg Per G Tube BID    pantoprazole  40 mg Per G Tube Daily    psyllium husk (aspartame)  1 packet Per G Tube Daily    zinc sulfate  220 mg Per G Tube Daily     PRN Meds:  Current Facility-Administered Medications:     dextrose 10 % in water (D10W), , Intravenous, Continuous PRN    dextrose 10%, 12.5 g, Intravenous, PRN    dextrose 10%, 25 g, Intravenous, PRN    diphenhydrAMINE-zinc acetate 2-0.1%, , Topical (Top), TID PRN    glucagon (human recombinant), 1 mg, Intramuscular, PRN    glucose, 16 g, Oral, PRN    glucose, 24 g, Oral, PRN    heparin (porcine), 1,000 Units, Intra-Catheter, PRN    heparin (porcine), 3,000 Units, Intravenous, PRN    naloxone, 0.02 mg, Intravenous, PRN    ondansetron, 4 mg, Intravenous, Q6H PRN    sodium chloride 0.9%, 10 mL, Intravenous, Q12H PRN

## 2024-05-12 NOTE — PLAN OF CARE
Problem: Infection  Goal: Absence of Infection Signs and Symptoms  Outcome: Progressing     Problem: Skin Injury Risk Increased  Goal: Skin Health and Integrity  Outcome: Progressing  Intervention: Optimize Skin Protection  Flowsheets (Taken 5/12/2024 0458)  Pressure Reduction Techniques:   frequent weight shift encouraged   weight shift assistance provided  Skin Protection: incontinence pads utilized  Activity Management: Rolling - L1     Problem: Adult Inpatient Plan of Care  Goal: Plan of Care Review  Outcome: Progressing  Flowsheets (Taken 5/12/2024 0458)  Plan of Care Reviewed With: patient     Problem: Bariatric Environmental Safety  Goal: Safety Maintained with Care  Outcome: Progressing     Problem: Diabetes Comorbidity  Goal: Blood Glucose Level Within Targeted Range  Outcome: Progressing     Problem: Fall Injury Risk  Goal: Absence of Fall and Fall-Related Injury  Outcome: Progressing     Problem: Wound  Goal: Absence of Infection Signs and Symptoms  Outcome: Progressing

## 2024-05-12 NOTE — ASSESSMENT & PLAN NOTE
Creatine stable for now. BMP reviewed- noted Estimated Creatinine Clearance: 15.5 mL/min (A) (based on SCr of 5.7 mg/dL (H)). according to latest data. Based on current GFR, CKD stage is end stage.  Monitor UOP and serial BMP and adjust therapy as needed. Renally dose meds. Avoid nephrotoxic medications and procedures.    -- HD MWF  -- nephro following

## 2024-05-13 PROBLEM — I63.89 AC ISCH MULTI VASC TERRITORIES STROKE: Status: RESOLVED | Noted: 2024-02-06 | Resolved: 2024-05-13

## 2024-05-13 LAB
ALBUMIN SERPL BCP-MCNC: 3.4 G/DL (ref 3.5–5.2)
ANION GAP SERPL CALC-SCNC: 17 MMOL/L (ref 8–16)
BUN SERPL-MCNC: 78 MG/DL (ref 6–20)
CALCIUM SERPL-MCNC: 10.7 MG/DL (ref 8.7–10.5)
CHLORIDE SERPL-SCNC: 92 MMOL/L (ref 95–110)
CO2 SERPL-SCNC: 19 MMOL/L (ref 23–29)
CREAT SERPL-MCNC: 7 MG/DL (ref 0.5–1.4)
EST. GFR  (NO RACE VARIABLE): 6.5 ML/MIN/1.73 M^2
GLUCOSE SERPL-MCNC: 133 MG/DL (ref 70–110)
PHOSPHATE SERPL-MCNC: 4.6 MG/DL (ref 2.7–4.5)
POCT GLUCOSE: 130 MG/DL (ref 70–110)
POCT GLUCOSE: 145 MG/DL (ref 70–110)
POCT GLUCOSE: 153 MG/DL (ref 70–110)
POCT GLUCOSE: 155 MG/DL (ref 70–110)
POCT GLUCOSE: 175 MG/DL (ref 70–110)
POCT GLUCOSE: 175 MG/DL (ref 70–110)
POCT GLUCOSE: 198 MG/DL (ref 70–110)
POTASSIUM SERPL-SCNC: 4.3 MMOL/L (ref 3.5–5.1)
SODIUM SERPL-SCNC: 128 MMOL/L (ref 136–145)

## 2024-05-13 PROCEDURE — 27000207 HC ISOLATION

## 2024-05-13 PROCEDURE — 92526 ORAL FUNCTION THERAPY: CPT

## 2024-05-13 PROCEDURE — 63600175 PHARM REV CODE 636 W HCPCS: Performed by: STUDENT IN AN ORGANIZED HEALTH CARE EDUCATION/TRAINING PROGRAM

## 2024-05-13 PROCEDURE — 63600175 PHARM REV CODE 636 W HCPCS

## 2024-05-13 PROCEDURE — 25000242 PHARM REV CODE 250 ALT 637 W/ HCPCS

## 2024-05-13 PROCEDURE — 63600175 PHARM REV CODE 636 W HCPCS: Performed by: NURSE PRACTITIONER

## 2024-05-13 PROCEDURE — 20600001 HC STEP DOWN PRIVATE ROOM

## 2024-05-13 PROCEDURE — 97535 SELF CARE MNGMENT TRAINING: CPT

## 2024-05-13 PROCEDURE — 90935 HEMODIALYSIS ONE EVALUATION: CPT | Mod: ,,, | Performed by: INTERNAL MEDICINE

## 2024-05-13 PROCEDURE — 80100014 HC HEMODIALYSIS 1:1

## 2024-05-13 PROCEDURE — 97530 THERAPEUTIC ACTIVITIES: CPT

## 2024-05-13 PROCEDURE — 25000003 PHARM REV CODE 250

## 2024-05-13 PROCEDURE — 36415 COLL VENOUS BLD VENIPUNCTURE: CPT

## 2024-05-13 PROCEDURE — 80069 RENAL FUNCTION PANEL: CPT

## 2024-05-13 PROCEDURE — 97112 NEUROMUSCULAR REEDUCATION: CPT

## 2024-05-13 PROCEDURE — 97110 THERAPEUTIC EXERCISES: CPT

## 2024-05-13 RX ADMIN — HEPARIN SODIUM 3000 UNITS: 1000 INJECTION, SOLUTION INTRAVENOUS; SUBCUTANEOUS at 08:05

## 2024-05-13 RX ADMIN — PANTOPRAZOLE SODIUM 40 MG: 40 GRANULE, DELAYED RELEASE ORAL at 01:05

## 2024-05-13 RX ADMIN — INSULIN ASPART 4 UNITS: 100 INJECTION, SOLUTION INTRAVENOUS; SUBCUTANEOUS at 05:05

## 2024-05-13 RX ADMIN — HEPARIN SODIUM 7500 UNITS: 5000 INJECTION INTRAVENOUS; SUBCUTANEOUS at 06:05

## 2024-05-13 RX ADMIN — HEPARIN SODIUM 7500 UNITS: 5000 INJECTION INTRAVENOUS; SUBCUTANEOUS at 09:05

## 2024-05-13 RX ADMIN — Medication 500 MG: at 01:05

## 2024-05-13 RX ADMIN — INSULIN ASPART 4 UNITS: 100 INJECTION, SOLUTION INTRAVENOUS; SUBCUTANEOUS at 12:05

## 2024-05-13 RX ADMIN — ATORVASTATIN CALCIUM 40 MG: 40 TABLET, FILM COATED ORAL at 01:05

## 2024-05-13 RX ADMIN — METOPROLOL TARTRATE 25 MG: 25 TABLET, FILM COATED ORAL at 01:05

## 2024-05-13 RX ADMIN — INSULIN DETEMIR 27 UNITS: 100 INJECTION, SOLUTION SUBCUTANEOUS at 09:05

## 2024-05-13 RX ADMIN — PSYLLIUM HUSK 1 PACKET: 3.4 POWDER ORAL at 01:05

## 2024-05-13 RX ADMIN — INSULIN DETEMIR 27 UNITS: 100 INJECTION, SOLUTION SUBCUTANEOUS at 01:05

## 2024-05-13 RX ADMIN — Medication 500 MG: at 09:05

## 2024-05-13 RX ADMIN — ASPIRIN 81 MG CHEWABLE TABLET 81 MG: 81 TABLET CHEWABLE at 01:05

## 2024-05-13 RX ADMIN — INSULIN ASPART 4 UNITS: 100 INJECTION, SOLUTION INTRAVENOUS; SUBCUTANEOUS at 11:05

## 2024-05-13 RX ADMIN — METOPROLOL TARTRATE 25 MG: 25 TABLET, FILM COATED ORAL at 09:05

## 2024-05-13 RX ADMIN — INSULIN ASPART 4 UNITS: 100 INJECTION, SOLUTION INTRAVENOUS; SUBCUTANEOUS at 06:05

## 2024-05-13 RX ADMIN — INSULIN ASPART 4 UNITS: 100 INJECTION, SOLUTION INTRAVENOUS; SUBCUTANEOUS at 01:05

## 2024-05-13 RX ADMIN — HEPARIN SODIUM 7500 UNITS: 5000 INJECTION INTRAVENOUS; SUBCUTANEOUS at 01:05

## 2024-05-13 RX ADMIN — ERYTHROPOIETIN 6100 UNITS: 10000 INJECTION, SOLUTION INTRAVENOUS; SUBCUTANEOUS at 11:05

## 2024-05-13 RX ADMIN — ZINC SULFATE 220 MG (50 MG) CAPSULE 220 MG: CAPSULE at 01:05

## 2024-05-13 NOTE — PROGRESS NOTES
Dialysis started to [facility].  Tolerated well.    Contact isolation precautions maintained.

## 2024-05-13 NOTE — PROGRESS NOTES
NEPHROLOGY HEMODIALYSIS NOTE    Sarah Saravia is a 53 y.o. female currently on hemodialysis for removal of uremic toxins and volume management.     Patient seen and evaluated on hemodialysis, tolerating treatment, see HD flowsheet for vitals and assessments.    No Hypotension, chest pain, shortness of breath, cramping, nausea or vomiting.      Labs have been reviewed and the dialysate bath has been adjusted.    Labs:      Recent Labs   Lab 05/08/24  0445 05/10/24  0657 05/13/24  0510   * 127* 128*   K 4.0 3.8 4.3   CL 91* 90* 92*   CO2 20* 25 19*   BUN 50* 46* 78*   CREATININE 6.0* 5.7* 7.0*   CALCIUM 10.3 10.3 10.7*   PHOS 4.4  --  4.6*       Recent Labs   Lab 05/07/24  1028 05/10/24  0657   WBC 9.61 10.73   HGB 10.5* 9.7*   HCT 34.3* 31.6*    333          Assessment/Plan:  - Seen on dialysis this morning, tolerating session with current UFR, no complications.    - Will continue dialysis treatments while in-patient  - Continue to monitor intake and output   - Renally dose medications  - Pre/Post dialysis treatment weights  - Continue CALVIN with dialysis treatments for now   - Renal diet  - Will continue to follow closely.

## 2024-05-13 NOTE — PT/OT/SLP PROGRESS
Physical Therapy   Co-Treatment    Patient Name:  Sarah Saravia   MRN:  [id number]    Recommendations:     Discharge Recommendations: Moderate Intensity Therapy  Discharge Equipment Recommendations: lift device, hospital bed, wheelchair, bedside commode  Patient has a mobility limitation that significantly impairs their ability to participate in one or more mobility related activities of daily living, including toileting. This deficit can be resolved by using a bedside commode. Patient demonstrates mobility limitations that will cause them to be confined to one room at home without bathroom access for up to 30 days. Using a bedside commode will greatly improve the patient's ability to participate in MRADLs.   Barriers to discharge:  increased level of skilled assist    Assessment:     Sarah Saravia is a 53 y.o. female admitted with a medical diagnosis of Acute cystitis with hematuria.  She presents with the following impairments/functional limitations: weakness, impaired endurance, impaired functional mobility, pain, decreased lower extremity function, decreased upper extremity function, decreased ROM, impaired balance, impaired sensation, decreased safety awareness, impaired coordination Patient participating in 2 skilled therapy sessions this date for incorporating use of Jose lift to/from medi-chair per medical team request. Daughter present during second session for family training, good carryover noted for Jose and hospital bed training. Skilled assist of 2 therapists throughout transfer, patient with tolerance to sit UIC for approximately 3 hours this date. Future sessions to incorporate Jose lift trial to/from wheelchair for improved hip positioning. Patient will continue to benefit from skilled PT during this admit to address BLE strength and endurance deficits, and maximize independence with functional mobility.    Rehab Prognosis: Good; patient would benefit from acute skilled PT services to address  these deficits and reach maximum level of function.    Recent Surgery: * No surgery found *      Plan:     During this hospitalization, patient to be seen 3 x/week to address the identified rehab impairments via gait training, therapeutic activities, therapeutic exercises, neuromuscular re-education and progress toward the following goals:    Plan of Care Expires:  05/22/24    Subjective     Chief Complaint: unable to verbalize, grimaces with knee mobility when suspended in Jose lift  Patient/Family Comments/goals: daughter with family training on Jose lift  Pain/Comfort:  Pain Rating 1:  (patient unable to rate)  Pain Rating Post-Intervention 1:  (patient unable to rate)      Objective:     Communicated with RN prior to session.  Patient found HOB elevated with pressure relief boots, PEG Tube upon PT entry to room.     General Precautions: Standard, aspiration, aphasia, fall, contact, NPO  Orthopedic Precautions: N/A  Braces: N/A  Respiratory Status: Room air     Functional Mobility:  Bed Mobility:     Rolling Left:  total assistance and of 2 persons, facilitating reaching and rolling to promote patient participation  Rolling Right: total assistance and of 2 persons, facilitating reaching and rolling to promote patient participation  Scooting: total assistance and of 2 persons via drawsheet   In bed and with medi-chair completely flat to improve positioning  Transfers:     Bed <> Chair: total assistance and of 2 persons with  jose lift    Requiring skilled assist of 2 persons for appropriate hip/BLE positioning in chair and bed  Balance:   Sitting: supported sitting in medi-chair, encouraging midline posture and forward gaze       AM-PAC 6 CLICK MOBILITY  Turning over in bed (including adjusting bedclothes, sheets and blankets)?: 2  Sitting down on and standing up from a chair with arms (e.g., wheelchair, bedside commode, etc.): 1  Moving from lying on back to sitting on the side of the bed?: 2  Moving to and  from a bed to a chair (including a wheelchair)?: 1  Need to walk in hospital room?: 1  Climbing 3-5 steps with a railing?: 1  Basic Mobility Total Score: 8       Treatment & Education:  Daughter participates in family training, educated on DME and appropriate/safe utilization, all questions answered appropriately within scope of practice  Patient educated on current level of function and progression towards therapeutic goals.  Co-treatment with OT due to patient's poor activity tolerance and medical complexity requiring skilled assistance from 2 therapists.   Patient educated on importance of OOB activity to promote overall endurance.     Patient left HOB elevated with all lines intact, call button in reach, RN notified, and daughter present..    GOALS:   Multidisciplinary Problems       Physical Therapy Goals          Problem: Physical Therapy    Goal Priority Disciplines Outcome Goal Variances Interventions   Physical Therapy Goal     PT, PT/OT Progressing     Description: Goals to be met by: 24   Goals remain appropriate 5/3/2024 to be met by 24  Goals updated 2024 to be met by 24    Patient will increase functional independence with mobility by performin. Supine to sit with Maximum Assistance  2. Sit to supine with Maximum Assistance  3. Rolling to Left and Right with Moderate Assistance.  4. Sitting at edge of bed 5 minutes with Moderate Assistance- MET , monitor consistency  5. Lower extremity exercise program x20 reps per handout, with assistance as needed                         Time Tracking:     PT Received On: 24  PT Start Time: 1320   (first time-in), 1600 (second time-in)  PT Stop Time: 1410 (first time-out), 1628 (second time out)  PT Total Time (min): 50 min  + 28 = 78 minutes total    Billable Minutes: Therapeutic Exercise 50 and Neuromuscular Re-education 28       PT/PTA: PT     Number of PTA visits since last PT visit: 0     2024

## 2024-05-13 NOTE — PLAN OF CARE
Problem: Physical Therapy  Goal: Physical Therapy Goal  Description: Goals to be met by: 24   Goals remain appropriate 5/3/2024 to be met by 24  Goals updated 2024 to be met by 24    Patient will increase functional independence with mobility by performin. Supine to sit with Maximum Assistance  2. Sit to supine with Maximum Assistance  3. Rolling to Left and Right with Moderate Assistance.  4. Sitting at edge of bed 5 minutes with Moderate Assistance- MET , monitor consistency  5. Lower extremity exercise program x20 reps per handout, with assistance as needed    Outcome: Progressing

## 2024-05-13 NOTE — NURSING
Nurses Note -- 4 Eyes      5/13/2024   5:50 AM      Skin assessed during: Q Shift Change      [] No Altered Skin Integrity Present    []Prevention Measures Documented      [x] Yes- Altered Skin Integrity Present or Discovered   [x] LDA Added if Not in Epic (Describe Wound)   [x] New Altered Skin Integrity was Present on Admit and Documented in LDA   [x] Wound Image Taken    Wound Care Consulted? Yes    Attending Nurse:  Ricarda Eastman RN/Staff Member:

## 2024-05-13 NOTE — ASSESSMENT & PLAN NOTE
Creatine stable for now. BMP reviewed- noted Estimated Creatinine Clearance: 12.6 mL/min (A) (based on SCr of 7 mg/dL (H)). according to latest data. Based on current GFR, CKD stage is end stage.  Monitor UOP and serial BMP and adjust therapy as needed. Renally dose meds. Avoid nephrotoxic medications and procedures.    -- HD MWF  -- nephro following

## 2024-05-13 NOTE — CONSULTS
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    Hypermagnesemia 04/11/2024    POA, Mg 3.2  Daily chem       Morbid obesity     Necrotizing fasciitis        Social History     Socioeconomic History    Marital status: Single   Tobacco Use    Smoking status: Unknown     Social Determinants of Health     Financial Resource Strain: Patient Unable To Answer (3/14/2024)    Overall Financial Resource Strain (CARDIA)     Difficulty of Paying Living Expenses: Patient unable to answer   Recent Concern: Financial Resource Strain - High Risk (3/9/2024)    Overall Financial Resource Strain (CARDIA)     Difficulty of Paying Living Expenses: Very hard   Food Insecurity: Patient Unable To Answer (3/14/2024)    Hunger Vital Sign     Worried About Running Out of Food in the Last Year: Patient unable to answer     Ran Out of Food in the Last Year: Patient unable to answer   Transportation Needs: Patient Unable To Answer (3/14/2024)    PRAPARE - Transportation     Lack of Transportation (Medical): Patient unable to answer     Lack of Transportation (Non-Medical): Patient unable to answer   Physical Activity: Inactive (3/13/2024)    Exercise Vital Sign     Days of Exercise per Week: 0 days     Minutes of Exercise per Session: 0 min   Stress: Patient Unable To Answer (3/14/2024)    [employer] [place] [employer] - Occupational Stress Questionnaire     Feeling of Stress : Patient unable to answer   Housing Stability: Patient Unable To Answer (3/14/2024)    Housing Stability Vital Sign     Unable to Pay for Housing in the Last Year: Patient unable to answer     Number of Places Lived in the Last Year: 1     Unstable Housing in the Last Year: Patient unable to answer   Recent Concern: Housing Stability - High  Risk (3/9/2024)    Housing Stability Vital Sign     Unable to Pay for Housing in the Last Year: Yes     Unstable Housing in the Last Year: No       Precautions:     Allergies as of 04/10/2024    (No Known Allergies)       WO Assessment Details/Treatment     Patient seen for wound care consultation. -placed by LPN for R perineum/ abd wound. Last seen 5/2- R anterior medial thigh- now healed. R groin continues with improvement- rec to continue current local wound care to support continued healing.    Reviewed chart for this encounter.   See Flow Sheet for findings.      RECOMMENDATIONS:  R groin- 1)clean with Vashe solution and pat dry 2)apply silver hydrofiber (Aquacel Ag) to open area 3)cover with border foam dressing- sacrum 7x8 cm. Perform care MWF- or change dressing if becomes saturated. If dressing is changed daily- please re consult IP wound care.     Discussed POC with patient a  See EMR for orders & patient education.    Bedside nursing to continue care & monitoring.  Bedside nursing to maintain pressure injury prevention interventions.  Current documented Tomas score is 10 with a nutrition sub scale score of 1.     05/11/24 0930   WOCN Assessment   WOCN Total Time (mins) 30   Visit Date 05/11/24   Visit Time 0930   Consult Type New;Follow Up   WOCN Speciality Wound   Intervention assessed;changed;chart review;orders   Teaching on-going  (pt- wound assmt-continued recs.)        Incision/Site 01/29/24 2300 Right Pubis   Date First Assessed/Time First Assessed: 01/29/24 2300   Side: Right  Location: Pubis  Additional Comments: right groin area left open with betadine soaked Kerlix packing/4x4's and ABD's   Wound Image     Dressing Appearance Moist drainage   Drainage Amount Scant   Drainage Characteristics/Odor Serosanguineous   Appearance Red   Red (%), Wound Tissue Color 100 %   Periwound Area Dry;Intact   Care Cleansed with:;Antimicrobial agent  (Vashe solution)   Dressing  Changed;Silver;Hydrofiber;Silicone;Foam   Dressing Change Due 05/13/24     Will continue to follow as needed and/ or as directed until discharge.    Niki Jacobs BSN, RN, CWOCN

## 2024-05-13 NOTE — ASSESSMENT & PLAN NOTE
Patient's FSGs are controlled on current medication regimen.  Last A1c reviewed-   Lab Results   Component Value Date    HGBA1C 10.4 (H) 01/30/2024     Most recent fingerstick glucose reviewed-   Recent Labs   Lab 05/13/24  0708 05/13/24  0745 05/13/24  1139 05/13/24  1248   POCTGLUCOSE 130* 145* 153* 155*       Current correctional scale  Medium  Maintain anti-hyperglycemic dose as follows-   Antihyperglycemics (From admission, onward)    Start     Stop Route Frequency Ordered    04/17/24 1200  insulin aspart U-100 pen 4 Units         -- SubQ Every 6 hours 04/17/24 0938    04/13/24 2100  insulin detemir U-100 (Levemir) pen 27 Units         -- SubQ 2 times daily 04/13/24 0931        Hold Oral hypoglycemics while patient is in the hospital.  POCT glucose q6h

## 2024-05-13 NOTE — SUBJECTIVE & OBJECTIVE
Interval History: NAEON. Pending placement      Objective:     Vital Signs (Most Recent):  Temp: 98.4 °F (36.9 °C) (05/13/24 1247)  Pulse: (!) 111 (05/13/24 1247)  Resp: 16 (05/13/24 1247)  BP: 110/77 (05/13/24 1247)  SpO2: 99 % (05/13/24 1247) Vital Signs (24h Range):  Temp:  [98.2 °F (36.8 °C)-98.7 °F (37.1 °C)] 98.4 °F (36.9 °C)  Pulse:  [] 111  Resp:  [16-20] 16  SpO2:  [95 %-100 %] 99 %  BP: ()/(61-89) 110/77     Weight: 122.5 kg (270 lb 1 oz)  Body mass index is 42.3 kg/m².    Intake/Output Summary (Last 24 hours) at 5/13/2024 1308  Last data filed at 5/13/2024 1202  Gross per 24 hour   Intake 650 ml   Output 2090 ml   Net -1440 ml         Physical Exam  Vitals and nursing note reviewed.   HENT:      Head: Normocephalic and atraumatic.   Neck:      Comments: Trach removed  Cardiovascular:      Rate and Rhythm: Normal rate and regular rhythm.      Heart sounds: Normal heart sounds.   Pulmonary:      Effort: Pulmonary effort is normal. No respiratory distress.      Breath sounds: Normal breath sounds.   Abdominal:      General: Abdomen is flat. There is no distension.      Palpations: Abdomen is soft.      Tenderness: There is no abdominal tenderness.      Comments: PEG tube   Musculoskeletal:      Right lower leg: No edema.      Left lower leg: No edema.      Comments: Moves UEs spontaneously   Skin:     General: Skin is warm and dry.   Neurological:      General: No focal deficit present.      Mental Status: She is alert.      Comments: Nonverbal, not following commands             Significant Labs: All pertinent labs within the past 24 hours have been reviewed.    Significant Imaging: I have reviewed all pertinent imaging results/findings within the past 24 hours.

## 2024-05-13 NOTE — PLAN OF CARE
Pt. Alert. VSS. Denies any pain. PEG intact, tube feed tolerated; Blood sugar closely monitored. Incontinence care provided, Q2 repositioned. Fall precaution maintained, call light within reach.     Problem: Infection  Goal: Absence of Infection Signs and Symptoms  Outcome: Progressing     Problem: Skin Injury Risk Increased  Goal: Skin Health and Integrity  Outcome: Progressing     Problem: Adult Inpatient Plan of Care  Goal: Plan of Care Review  Outcome: Progressing     Problem: Bariatric Environmental Safety  Goal: Safety Maintained with Care  Outcome: Progressing     Problem: Device-Related Complication Risk (Hemodialysis)  Goal: Safe, Effective Therapy Delivery  Outcome: Progressing     Problem: Infection (Hemodialysis)  Goal: Absence of Infection Signs and Symptoms  Outcome: Progressing     Problem: Diabetes Comorbidity  Goal: Blood Glucose Level Within Targeted Range  Outcome: Progressing     Problem: Fall Injury Risk  Goal: Absence of Fall and Fall-Related Injury  Outcome: Progressing     Problem: Adjustment to Illness (Chronic Kidney Disease)  Goal: Optimal Coping with Chronic Illness  Outcome: Progressing

## 2024-05-13 NOTE — PLAN OF CARE
Patient in bed, no complaints at this time, safety measures in place, call light in reach, NADN, POC ongoing    Problem: Infection  Goal: Absence of Infection Signs and Symptoms  Outcome: Progressing     Problem: Skin Injury Risk Increased  Goal: Skin Health and Integrity  Outcome: Progressing     Problem: Adult Inpatient Plan of Care  Goal: Plan of Care Review  Outcome: Progressing  Goal: Patient-Specific Goal (Individualized)  Outcome: Progressing  Goal: Absence of Hospital-Acquired Illness or Injury  Outcome: Progressing  Goal: Optimal Comfort and Wellbeing  Outcome: Progressing  Goal: Readiness for Transition of Care  Outcome: Progressing     Problem: Bariatric Environmental Safety  Goal: Safety Maintained with Care  Outcome: Progressing     Problem: Device-Related Complication Risk (Hemodialysis)  Goal: Safe, Effective Therapy Delivery  Outcome: Progressing     Problem: Hemodynamic Instability (Hemodialysis)  Goal: Effective Tissue Perfusion  Outcome: Progressing     Problem: Infection (Hemodialysis)  Goal: Absence of Infection Signs and Symptoms  Outcome: Progressing     Problem: Diabetes Comorbidity  Goal: Blood Glucose Level Within Targeted Range  Outcome: Progressing     Problem: Adjustment to Illness (Sepsis/Septic Shock)  Goal: Optimal Coping  Outcome: Progressing     Problem: Glycemic Control Impaired (Sepsis/Septic Shock)  Goal: Blood Glucose Level Within Desired Range  Outcome: Progressing     Problem: Infection Progression (Sepsis/Septic Shock)  Goal: Absence of Infection Signs and Symptoms  Outcome: Progressing     Problem: Nutrition Impaired (Sepsis/Septic Shock)  Goal: Optimal Nutrition Intake  Outcome: Progressing     Problem: Fall Injury Risk  Goal: Absence of Fall and Fall-Related Injury  Outcome: Progressing     Problem: Adjustment to Illness (Chronic Kidney Disease)  Goal: Optimal Coping with Chronic Illness  Outcome: Progressing  Goal: Electrolyte Balance  Outcome: Progressing  Goal: Fluid  Balance  Outcome: Progressing     Problem: Electrolyte Imbalance (Chronic Kidney Disease)  Goal: Electrolyte Balance  Outcome: Progressing     Problem: Fluid Volume Excess (Chronic Kidney Disease)  Goal: Fluid Balance  Outcome: Progressing     Problem: Functional Decline (Chronic Kidney Disease)  Goal: Optimal Functional Ability  Outcome: Progressing     Problem: Hematologic Alteration (Chronic Kidney Disease)  Goal: Absence of Anemia Signs and Symptoms  Outcome: Progressing     Problem: Oral Intake Inadequate (Chronic Kidney Disease)  Goal: Optimal Oral Intake  Outcome: Progressing     Problem: Pain (Chronic Kidney Disease)  Goal: Acceptable Pain Control  Outcome: Progressing     Problem: Renal Function Impairment (Chronic Kidney Disease)  Goal: Minimize Renal Failure Effects  Outcome: Progressing     Problem: Restraint, Nonviolent  Goal: Absence of Harm or Injury  Outcome: Progressing     Problem: Wound  Goal: Optimal Coping  Outcome: Progressing  Goal: Optimal Functional Ability  Outcome: Progressing  Goal: Absence of Infection Signs and Symptoms  Outcome: Progressing  Goal: Improved Oral Intake  Outcome: Progressing  Goal: Optimal Pain Control and Function  Outcome: Progressing  Goal: Skin Health and Integrity  Outcome: Progressing  Goal: Optimal Wound Healing  Outcome: Progressing

## 2024-05-13 NOTE — PT/OT/SLP PROGRESS
Speech Language Pathology Treatment    Patient Name:  Sarah Saravia   MRN:  [id number]  Admitting Diagnosis: Acute cystitis with hematuria    Recommendations:                 General Recommendations:  Dysphagia therapy and Speech/language therapy  Diet recommendations:  NPO, Liquid Diet Level: NPO   Aspiration Precautions: Continue alternate means of nutrition, Frequent oral care, and Strict aspiration precautions   General Precautions: Standard, aphasia, aspiration, contact, fall, NPO  Communication strategies:  go to room if call light pushed    Assessment:     Sarah Saravia is a 53 y.o. female with an SLP diagnosis of Aphasia, Dysphagia, and Cognitive-Linguistic Impairment.      Subjective     Pt remained nonverbal t/o session.    Pain/Comfort:  Pain Rating 1: 0/10    Respiratory Status: Room air    Objective:     Has the patient been evaluated by SLP for swallowing?   Yes  Keep patient NPO? Yes   Current Respiratory Status:        Pt seen for ongoing swallowing assessment/dysphagia therapy while sitting upright in medichair in room.  Pt was awake/alert, but remained nonverbal. Pt accepted tsp sips of water x 2 with prolonged holding observed.  Straw sip given in an effort to elicit pharyngeal swallow, which was successful.  Pt accepted 1/2 tsp applesauce, but exhibiting holding again. Straw sip of water given to facilitate oral transit and initiation of swallow.  Pt appeared to exhibit piecemeal deglutition and holding. SLP performed oral suctioning to remove held purees mixed with water, which appeared to trigger gagging and regurgitation.  Pt appeared to hold regurgitated material in oral cavity and did not follow commands to expel into emesis basin. Oral cavity suctioned to remove held material, which appeared to be water which was possible regurgitated.  No further PO trials were attempted.  Pt appeared to have recovered from any further gagging and regurgitation.  Nurse notified and came to room to check  O2 sats (95%).  Education was provided to pt t/o session regarding role of SLP, ongoing swallowing assessment, recommendations to remain NPO at this time, and SLP treatment plan and POC. Pt did not demonstrate understanding.     Goals:   Multidisciplinary Problems       SLP Goals          Problem: SLP    Goal Priority Disciplines Outcome   SLP Goal     SLP    Description: Speech Language Pathology Goals  Updated goals expected to be met by 5/10 (goals remain appropriate - reassess on 5/17):  1. Pt will participate in ongoing swallowing assessment to determine if appropriate for PO trials for pleasure.   2. Pt will model single step command x 1 given max cues.   3. Pt will answer simple yes/no q's during a structured receptive language task x 1 given max cues.   4. Pt will phonate purposefully upon command x 1 given max cues.   5. Pt will attempt to vocalize/verbalize during automatic speech task x 1 given max cues.   6. Pt will participate in evaluation of ability to utilize basic communication board.     Goals expected to be met by 5/8:  1. Pt will participate in Modified Barium Swallow Study to determine if safe for oral intake. Goal met/attempted 5/2                               Plan:     Patient to be seen:  3 x/week   Plan of Care expires:  05/31/24  Plan of Care reviewed with:  patient, daughter   SLP Follow-Up:  Yes       Discharge recommendations:  Moderate Intensity Therapy     Time Tracking:     SLP Treatment Date:   05/13/24  Speech Start Time:  1424  Speech Stop Time:  1440     Speech Total Time (min):  16 min    Billable Minutes: Treatment Swallowing Dysfunction 8 and Self Care/Home Management Training 8    05/13/2024

## 2024-05-13 NOTE — PROGRESS NOTES
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 History was given from patient's daughter. She was undergoing dialysis today and noticed she was lethargic, less alert than usual self. Pt completed dialysis and still not acting herself. EMS was called, fever of a 100.  On chart review, she did have an episode of large volume emesis around 1700.  Per EMS, she had a slightly elevated temp 100.0°F, glucose 300s.     In the ED: UA 2+ leuks, >100 WBC, many bacteria, WBC 17, CT abd/pelvis concerning for cystitis. Given vanc/zosyn    Overview/Hospital Course:  Patient was admitted to Hospital Medicine service for medical management and evaluation of urosepsis. Patient was continued on vanc/zosyn. Vascular Neurology consulted concerning mental status change with the recommendation to initiate ASA 81 QD and to obtain an MRI to r/o new stroke. Imaging pending. Nephrology was consulted for regularly scheduled dialysis. Afternoon of 4/12, patient was unable to tolerate filtration of volume during dialsis 2/2 hypotension. Patient received 500cc bolus and was transferred back to floor after finishing the session without volume removed. Will monitor for signs of volume overload. Tailoring insulin regimen while uptitrating tube feeds to goal rate. Staph Epi in all 4 bottles; ID with recommendation to continue Vanc & de-escalate meropenem to ertapenem on 04/15 through 4/16. Patient completed IV course of UTI coverage. Patient tolerated volume removal well in dialysis  throughout the rest of her hospital stay. Pending discharge to LTACH pending bed availability. Patient without BM with one episode of vomiting overnight 4/17. KUB with bowel gas and low concern for obstruction with no transition point noted. Escalating bowel regimen. Pending placement as of 4/22. Pulm consult placed to evaluate for possible trach downsize & eventual decannulation to assist placement if safe. Trach capping trial per pulm for 48h and will assess for decannulation on Monday. DVT studies negative. Pulm successfully decannulated pt 4/30, IR exchanged TDC. Pending swallow study with SLP and waiting for dispo options. Pt failed swallow study, placement pending. Working with PT on mobilize pt to sitting in a chair to expand placement options    Interval History: NAEON. Pending placement      Objective:     Vital Signs (Most Recent):  Temp: 98.4 °F (36.9 °C) (05/13/24 1247)  Pulse: (!) 111 (05/13/24 1247)  Resp: 16 (05/13/24 1247)  BP: 110/77 (05/13/24 1247)  SpO2: 99 % (05/13/24 1247) Vital Signs (24h Range):  Temp:  [98.2 °F (36.8 °C)-98.7 °F (37.1 °C)] 98.4 °F (36.9 °C)  Pulse:  [] 111  Resp:  [16-20] 16  SpO2:  [95 %-100 %] 99 %  BP: ()/(61-89) 110/77     Weight: 122.5 kg (270 lb 1 oz)  Body mass index is 42.3 kg/m².    Intake/Output Summary (Last 24 hours) at 5/13/2024 1308  Last data filed at 5/13/2024 1202  Gross per 24 hour   Intake 650 ml   Output 2090 ml   Net -1440 ml         Physical Exam  Vitals and nursing note reviewed.   HENT:      Head: Normocephalic and atraumatic.   Neck:      Comments: Trach removed  Cardiovascular:      Rate and Rhythm: Normal rate and regular rhythm.      Heart sounds: Normal heart sounds.   Pulmonary:      Effort: Pulmonary effort is normal. No respiratory distress.      Breath sounds: Normal breath sounds.   Abdominal:      General: Abdomen is flat. There is no distension.      Palpations: Abdomen is soft.      Tenderness: There is no abdominal tenderness.       Comments: PEG tube   Musculoskeletal:      Right lower leg: No edema.      Left lower leg: No edema.      Comments: Moves UEs spontaneously   Skin:     General: Skin is warm and dry.   Neurological:      General: No focal deficit present.      Mental Status: She is alert.      Comments: Nonverbal, not following commands             Significant Labs: All pertinent labs within the past 24 hours have been reviewed.    Significant Imaging: I have reviewed all pertinent imaging results/findings within the past 24 hours.    Assessment/Plan:      * Acute cystitis with hematuria  resolved    Pt presenting with AMS and worsening lethargy. Pt is non-verbal at baseline, does not follow commands, but was less responsive than normal. Pt found to have a fever. Urinary source suspected based off of urine and CT abd/pelvis. Pt has many prior resistant bacterial infections including urinary sources. Has prior with sensitivity to zosyn and has also improved off ED dose of vanc/zosyn. Lactic elevated a 3.8->3.49 prior to completion of fluid bolus 500ml.    Patient with new fever and tachycardia on 4/25. Low threshold to initiate additional infectious workup and repeat UA.     Plan  S/p course of vanc/ertapenem per ID. Course completed 4/16.  Daily cbc  Contact precautions    *on contact precautions indefinitely while patient still makes urine given pt incontinent per infection control    Complication of vascular dialysis catheter  Cath intermittently clogging, not resolving with cath-hedy, going for IR venogram 4/30 with possible exchange. S/p exchange with IR on 4/30      Constipation  RESOLVED with Bowel regimen  Patient without BM x5d. One episode of vomitus night of 4/17. Some concern for bowel obstruction. Tolerating continuous tube feeds at goal rate with 0cc on residuals. Physical exam with bowel sounds, soft nontender abdomen. KUB without concern for obstruction with bowel gas, lack of transition point.    Patient now with  regular bowel movements on bowel regimen.     Plan  - Miralax BID, Senna BID  - compazine PRN    Moderate malnutrition  Nutrition consulted. Most recent weight and BMI monitored-     Measurements:  Wt Readings from Last 1 Encounters:   05/08/24 122.5 kg (270 lb 1 oz)   Body mass index is 42.3 kg/m².    Patient has been screened and assessed by RD.    Malnutrition Type:  Context: acute illness or injury  Level: moderate    Malnutrition Characteristic Summary:  Weight Loss (Malnutrition): 10% in 6 months  Subcutaneous Fat (Malnutrition): mild depletion  Muscle Mass (Malnutrition): mild depletion  Fluid Accumulation (Malnutrition): mild    Interventions/Recommendations (treatment strategy):  1.    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 Physical therapy services has been scheduled. Continue all standard measures for pressure injury prevention and consult wound care for any wounds (chronic or acute).    ESRD (end stage renal disease) on dialysis  Creatine stable for now. BMP reviewed- noted Estimated Creatinine Clearance: 12.6 mL/min (A) (based on SCr of 7 mg/dL (H)). according to latest data. Based on current GFR, CKD stage is end stage.  Monitor UOP and serial BMP and adjust therapy as needed. Renally dose meds. Avoid nephrotoxic medications and procedures.    -- HD MWF  -- nephro following    Status post tracheostomy  Resolved, decannulated 4/30    Acute encephalopathy  Pt is non-verbal and does not follow commands at baseline. Vascular Neurology consulted given acute change from baseline. MRI with concern  for evolution of prior strokes with noted differential of T2 hyperintensities including vasculitis vs demyelination. Discussed with Vascular Neurology; low concern for ongoing vasculitis/demyelination, likely represents typical evolution of prior infarcts.    Patient at baseline as of 4/22.     Plan  - STAT head imaging for concern of new change in mental status/focal deficit    Type 2 diabetes mellitus with hyperglycemia, with long-term current use of insulin  Patient's FSGs are controlled on current medication regimen.  Last A1c reviewed-   Lab Results   Component Value Date    HGBA1C 10.4 (H) 01/30/2024     Most recent fingerstick glucose reviewed-   Recent Labs   Lab 05/13/24  0708 05/13/24  0745 05/13/24  1139 05/13/24  1248   POCTGLUCOSE 130* 145* 153* 155*       Current correctional scale  Medium  Maintain anti-hyperglycemic dose as follows-   Antihyperglycemics (From admission, onward)      Start     Stop Route Frequency Ordered    04/17/24 1200  insulin aspart U-100 pen 4 Units         -- SubQ Every 6 hours 04/17/24 0938    04/13/24 2100  insulin detemir U-100 (Levemir) pen 27 Units         -- SubQ 2 times daily 04/13/24 0931          Hold Oral hypoglycemics while patient is in the hospital.  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 Pt underwent extensive course, currently still healing from this, but no longer has wound vac. Pt's wound currently covered with bandage. Picture in media tabs    Plan  Wound care        VTE Risk Mitigation (From admission, onward)           Ordered     heparin (porcine) injection 3,000 Units  As needed (PRN)         04/17/24 0825     heparin (porcine) injection 1,000 Units  As needed (PRN)         04/12/24 0951     heparin (porcine) injection 7,500 Units  Every 8 hours         04/11/24 0252                    Discharge Planning   ZEKE: 5/15/2024     Code Status: Full Code   Is the patient medically ready  for discharge?: No    Reason for patient still in hospital (select all that apply): Patient trending condition  Discharge Plan A: New Nursing Home placement - [facility] care facility                  Osito Dos Santos MD  Department of Hospital Medicine   [facility] - Telemetry Stepdown

## 2024-05-13 NOTE — PROGRESS NOTES
Dialysis complete.  Blood returned.  [facility] flushed, heparin locked, capped, taped and wrapped in gauze.    Net UF 1.4L      Unable to remove the 2 L as ordered d/t BP drop to 89/63.    Epo given.      Transported from [facility] to room 8092 via bed by transporters.

## 2024-05-13 NOTE — PT/OT/SLP PROGRESS
Occupational Therapy   Co-Treatment    Name: Sarah Saravia  MRN: [id number]  Admitting Diagnosis:  Acute cystitis with hematuria       Recommendations:     Discharge Recommendations: Moderate Intensity Therapy  Discharge Equipment Recommendations:  to be determined by next level of care, wheelchair, hospital bed, lift device  Barriers to discharge:  Other (Comment) (skilled assistance required)    Assessment:     Sarah Saravia is a 53 y.o. female with a medical diagnosis of Acute cystitis with hematuria.  She presents with the following performance deficits affecting function:  weakness, impaired endurance, impaired self care skills, impaired functional mobility, gait instability, impaired balance, impaired cognition, decreased coordination, decreased upper extremity function, decreased lower extremity function, impaired coordination, decreased safety awareness, pain.     Pt with good tolerance to the session this date. MD reached out regarding practicing sandee lift transfer in the event that Pt would d/c home. Pt required total A x 2 skilled therapists to place sandee pad, transfer, and safely position into [employer]. Unable to safely position hips due to size of chair so had to lay chair flat to scoot Pt up to the top for safe positioning. Will need to trial placing into wheelchair next time for a more functional transfer. Pt able to sit up in the chair for ~2.5 hours with good tolerance. Returned to assist Pt back into bed with daughter present to perform family training. Family member very receptive and eager to learn in the instance that she needs to perform. At this time, this is a two person transfer via sandee. Pt would benefit from continued skilled acute OT services during this admission in order to maximize independence and safety with ADLs and functional mobility to ensure safe return to PLOF in the least restrictive environment. Patient currently demonstrates a need for moderate intensity therapy on a  daily basis post acute secondary to a decline in functional status due to illness      Rehab Prognosis:  Fair; patient would benefit from acute skilled OT services to address these deficits and reach maximum level of function.       Plan:     Patient to be seen 3 x/week to address the above listed problems via self-care/home management, therapeutic activities, therapeutic exercises, neuromuscular re-education  Plan of Care Expires: 05/22/24  Plan of Care Reviewed with: patient    Subjective     Chief Complaint: Pain in knees   Patient/Family Comments/goals: To return to PLOF  Pain/Comfort:  Pain Rating 1: other (see comments) (unable to rate)  Location - Side 1: Bilateral  Location - Orientation 1: generalized  Location 1: knee (with flexion)  Pain Addressed 1: Reposition, Distraction, Cessation of Activity  Pain Rating Post-Intervention 1: 0/10    Objective:     Co-evaluation/treatment performed due to patient's multiple deficits requiring two skilled therapists to appropriately and safely assess patient's strength and endurance while facilitating functional tasks in addition to accommodating for patient's activity tolerance.     Communicated with: RN prior to session.  Patient found HOB elevated with PEG Tube, pressure relief boots upon OT entry to room.    General Precautions: Standard, fall, aphasia, NPO, contact    Orthopedic Precautions:N/A  Braces: N/A  Respiratory Status: Room air     Occupational Performance:     Bed Mobility:    Patient completed Rolling/Turning to Left with  total assistance and 2 persons  Patient completed Rolling/Turning to Right with total assistance and 2 persons     Functional Mobility/Transfers:  Patient completed Bed <> Chair Transfer using sandee lift technique with total assistance and of 2 persons   2nd person needed to guide hips back into chair/bed   Pt with poor tolerance with knee flexion in order to properly position LE during transfers     Activities of Daily  Living:  Toileting: total assistance : Pt incontinent of urine and requires total A x 2 for all ADLs at this time     [facility] 6 Click ADL: 8    Treatment & Education:  Discussed OT POC and answered all questions within OT scope of practice.  Whiteboard updated   Education given to family member on use of sandee lift in the case of discharging home   Family member receptive to education, but will require a second person and continuous training     Patient left HOB elevated with all lines intact, call button in reach, and family present    GOALS:   Multidisciplinary Problems       Occupational Therapy Goals          Problem: Occupational Therapy    Goal Priority Disciplines Outcome Interventions   Occupational Therapy Goal     OT, PT/OT Progressing    Description: Goals to be met by: 5/22/24     Patient will increase functional independence with ADLs by performing:    UE Dressing with Maximum Assistance.  Grooming while EOB with Maximum Assistance.  Sitting at edge of bed x5 minutes with Maximum Assistance.  Rolling to Bilateral with Moderate Assistance.   Supine to sit with Maximum Assistance.                         Time Tracking:     OT Date of Treatment: 05/13/24  OT Start Time: 1320  OT Stop Time: 1410  Returned 1600- 1630   OT Total Time (min): 120 min    Billable Minutes:Self Care/Home Management 23  Therapeutic Activity 97    OT/JOSÉ: OT     Number of JOSÉ visits since last OT visit: 0    5/13/2024

## 2024-05-14 LAB
POCT GLUCOSE: 141 MG/DL (ref 70–110)
POCT GLUCOSE: 147 MG/DL (ref 70–110)
POCT GLUCOSE: 156 MG/DL (ref 70–110)
POCT GLUCOSE: 168 MG/DL (ref 70–110)
POCT GLUCOSE: 183 MG/DL (ref 70–110)

## 2024-05-14 PROCEDURE — 25000003 PHARM REV CODE 250

## 2024-05-14 PROCEDURE — 63600175 PHARM REV CODE 636 W HCPCS

## 2024-05-14 PROCEDURE — 99232 SBSQ HOSP IP/OBS MODERATE 35: CPT | Mod: ,,, | Performed by: INTERNAL MEDICINE

## 2024-05-14 PROCEDURE — 97530 THERAPEUTIC ACTIVITIES: CPT

## 2024-05-14 PROCEDURE — 25000242 PHARM REV CODE 250 ALT 637 W/ HCPCS

## 2024-05-14 PROCEDURE — 20600001 HC STEP DOWN PRIVATE ROOM

## 2024-05-14 PROCEDURE — 25000003 PHARM REV CODE 250: Performed by: STUDENT IN AN ORGANIZED HEALTH CARE EDUCATION/TRAINING PROGRAM

## 2024-05-14 PROCEDURE — 97110 THERAPEUTIC EXERCISES: CPT

## 2024-05-14 PROCEDURE — 27000207 HC ISOLATION

## 2024-05-14 RX ORDER — ACETAMINOPHEN 650 MG/20.3ML
650 LIQUID ORAL EVERY 6 HOURS PRN
Status: DISCONTINUED | OUTPATIENT
Start: 2024-05-14 | End: 2024-06-10

## 2024-05-14 RX ORDER — HEPARIN SODIUM 1000 [USP'U]/ML
1000 INJECTION, SOLUTION INTRAVENOUS; SUBCUTANEOUS
Status: DISCONTINUED | OUTPATIENT
Start: 2024-05-15 | End: 2024-05-22

## 2024-05-14 RX ORDER — SODIUM CHLORIDE 9 MG/ML
INJECTION, SOLUTION INTRAVENOUS ONCE
Status: DISCONTINUED | OUTPATIENT
Start: 2024-05-15 | End: 2024-05-16

## 2024-05-14 RX ADMIN — HEPARIN SODIUM 7500 UNITS: 5000 INJECTION INTRAVENOUS; SUBCUTANEOUS at 09:05

## 2024-05-14 RX ADMIN — METOPROLOL TARTRATE 25 MG: 25 TABLET, FILM COATED ORAL at 09:05

## 2024-05-14 RX ADMIN — ACETAMINOPHEN 650 MG: 650 SOLUTION ORAL at 06:05

## 2024-05-14 RX ADMIN — Medication 500 MG: at 08:05

## 2024-05-14 RX ADMIN — PANTOPRAZOLE SODIUM 40 MG: 40 GRANULE, DELAYED RELEASE ORAL at 09:05

## 2024-05-14 RX ADMIN — HEPARIN SODIUM 7500 UNITS: 5000 INJECTION INTRAVENOUS; SUBCUTANEOUS at 01:05

## 2024-05-14 RX ADMIN — ASPIRIN 81 MG CHEWABLE TABLET 81 MG: 81 TABLET CHEWABLE at 09:05

## 2024-05-14 RX ADMIN — Medication 500 MG: at 09:05

## 2024-05-14 RX ADMIN — HEPARIN SODIUM 7500 UNITS: 5000 INJECTION INTRAVENOUS; SUBCUTANEOUS at 06:05

## 2024-05-14 RX ADMIN — ATORVASTATIN CALCIUM 40 MG: 40 TABLET, FILM COATED ORAL at 09:05

## 2024-05-14 RX ADMIN — PSYLLIUM HUSK 1 PACKET: 3.4 POWDER ORAL at 09:05

## 2024-05-14 RX ADMIN — INSULIN DETEMIR 27 UNITS: 100 INJECTION, SOLUTION SUBCUTANEOUS at 09:05

## 2024-05-14 RX ADMIN — METOPROLOL TARTRATE 25 MG: 25 TABLET, FILM COATED ORAL at 08:05

## 2024-05-14 RX ADMIN — INSULIN DETEMIR 27 UNITS: 100 INJECTION, SOLUTION SUBCUTANEOUS at 08:05

## 2024-05-14 RX ADMIN — ZINC SULFATE 220 MG (50 MG) CAPSULE 220 MG: CAPSULE at 09:05

## 2024-05-14 RX ADMIN — INSULIN ASPART 4 UNITS: 100 INJECTION, SOLUTION INTRAVENOUS; SUBCUTANEOUS at 01:05

## 2024-05-14 RX ADMIN — INSULIN ASPART 4 UNITS: 100 INJECTION, SOLUTION INTRAVENOUS; SUBCUTANEOUS at 05:05

## 2024-05-14 RX ADMIN — INSULIN ASPART 4 UNITS: 100 INJECTION, SOLUTION INTRAVENOUS; SUBCUTANEOUS at 06:05

## 2024-05-14 NOTE — PROGRESS NOTES
[facility] - Telemetry Stepdown  Nephrology  Progress Note    Patient Name: Sarah Saravia  MRN: [id number]  Admission Date: 4/10/2024  Hospital Length of Stay: 33 days  Attending Provider: Soco Erickson MD   Primary Care Physician: Deanna, Primary Doctor  Principal Problem:Acute cystitis with hematuria    Subjective:     HPI: The patient is a 53 y.o. 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 The patient is unable to provide adequate history. Additional history and patient information was obtained from patient's daughter, past medical records and ER records. Per chart, she was undergoing dialysis Wednesday and was noted to be more lethargic than usual despite completing her HD session. EMS was called, fever of a 100. In the ED, UA 2+ leuks, >100 WBC, many bacteria, WBC 17, CT abd/pelvis concerning for cystitis. Given vanc/zosyn and admitted.     Of note, the patient was initiated on RRT in February 2024 after being admitted with ALVARADO 2/2 iATN due to septic shock. Perm cath placed on 2/29/24. She was transferred to [facility] on 3/12. Nephrology consulted for management of ESRD and HD treatment.      Interval History: patient had HD yesterday and bp dropped to 89/63 per RN note/ heparin lock used for cath . Net UF yesterday 1.4L instead of 2L because BP dropped. Patient feels ok today     Review of patient's allergies indicates:  No Known Allergies  Current Facility-Administered Medications   Medication Frequency    ascorbic acid (vitamin C) tablet 500 mg BID    aspirin chewable tablet 81 mg Daily    atorvastatin tablet 40 mg Daily    dextrose 10 % infusion Continuous PRN    dextrose 10% bolus 125 mL 125 mL PRN    dextrose 10% bolus 250 mL 250 mL PRN    diphenhydrAMINE-zinc acetate 2-0.1% cream TID PRN    epoetin merry injection 6,100 Units Every Mon, Wed, Fri     glucagon (human recombinant) injection 1 mg PRN    glucose chewable tablet 16 g PRN    glucose chewable tablet 24 g PRN    heparin (porcine) injection 1,000 Units PRN    heparin (porcine) injection 3,000 Units PRN    heparin (porcine) injection 7,500 Units Q8H    insulin aspart U-100 pen 4 Units Q6H    insulin detemir U-100 (Levemir) pen 27 Units BID    metoprolol tartrate (LOPRESSOR) tablet 25 mg BID    naloxone 0.4 mg/mL injection 0.02 mg PRN    ondansetron injection 4 mg Q6H PRN    pantoprazole suspension 40 mg Daily    psyllium husk (aspartame) 3.4 gram packet 1 packet Daily    sodium chloride 0.9% flush 10 mL Q12H PRN    zinc sulfate capsule 220 mg Daily       Objective:     Vital Signs (Most Recent):  Temp: 99 °F (37.2 °C) (05/14/24 0823)  Pulse: 101 (05/14/24 0823)  Resp: 18 (05/14/24 0823)  BP: 113/66 (05/14/24 0823)  SpO2: 97 % (05/14/24 0823) Vital Signs (24h Range):  Temp:  [97.5 °F (36.4 °C)-99 °F (37.2 °C)] 99 °F (37.2 °C)  Pulse:  [] 101  Resp:  [16-18] 18  SpO2:  [97 %-100 %] 97 %  BP: ()/(61-89) 113/66     Weight: 122.5 kg (270 lb 1 oz) (05/08/24 1241)  Body mass index is 42.3 kg/m².  Body surface area is 2.41 meters squared.    I/O last 3 completed shifts:  In: 2030 [Other:650; NG/GT:1380]  Out: 2090 [Other:2090]     Physical Exam  Vitals reviewed.   Constitutional:       Appearance: She is obese.   HENT:      Head: Normocephalic and atraumatic.      Mouth/Throat:      Mouth: Mucous membranes are moist.   Eyes:      Conjunctiva/sclera: Conjunctivae normal.      Pupils: Pupils are equal, round, and reactive to light.   Cardiovascular:      Rate and Rhythm: Normal rate and regular rhythm.      Heart sounds: Normal heart sounds.   Pulmonary:      Effort: Pulmonary effort is normal.      Breath sounds: Normal breath sounds.   Abdominal:      General: Bowel sounds are normal.      Palpations: Abdomen is soft.      Tenderness: There is no abdominal tenderness. There is no right CVA  tenderness, left CVA tenderness or guarding.   Musculoskeletal:         General: No swelling.      Right lower leg: No edema.      Left lower leg: No edema.   Skin:     Coloration: Skin is not jaundiced.      Findings: No rash.          Significant Labs:  CBC:   Recent Labs   Lab 05/10/24  0657   WBC 10.73   RBC 3.60*   HGB 9.7*   HCT 31.6*      MCV 88   MCH 26.9*   MCHC 30.7*     CMP:   Recent Labs   Lab 05/13/24  0510   *   CALCIUM 10.7*   ALBUMIN 3.4*   *   K 4.3   CO2 19*   CL 92*   BUN 78*   CREATININE 7.0*        Significant Imaging:  Reviewed   Assessment/Plan:     Renal/  ESRD (end stage renal disease) on dialysis  53 y.o. Black or  Female ESRD-HD M-W-F at [facility] presents to ED on 4/10/2024 with UTI.    Of note, the patient was initiated on RRT in February 2024 after being admitted with ALVARADO 2/2 iATN due to septic shock. Perm cath placed on 2/29/24. She was transferred to [facility] on 3/12. Deemed ESRD at [facility].  Nephrology consulted for inpatient ESRD-HD management    Assessment:   - Dialysis for metabolic clearance and volume management will be provided tomorrow with -1L as BP tolerates   - Continue to monitor intake and output  - Please avoid gadolinium, fleets, phos-based laxatives, NSAIDs  - Dialysis thrice weekly unless more urgent indications arise. Will evaluate RRT requirements Daily.    Anemia of ESRD   Recent Labs   Lab 05/10/24  0657   WBC 10.73   HGB 9.7*   HCT 31.6*          Lab Results   Component Value Date    FESATURATED 10 (L) 03/15/2024    FERRITIN 414 (H) 03/15/2024       - Goal in ESRD is Hgb of 10-11.   - c/w epo with HD hgb is 9.7. follow cbc     CKD/MBD:   - Ca 10.7 will adjust with HD.   - F/U PO4, Mg, Calcium. And albumin levels daily.   - Renal diet with protein intake goal 1.5 g/kg/d with 1 L fluid restriction   - Phos 4.2, continue Sevelamer.    Endocrine  Hyponatremia  Free water restriction   Will adjust with dialysis bath           Ali  MD Breanna  Nephrology  [facility] - Telemetry Stepdown

## 2024-05-14 NOTE — ASSESSMENT & PLAN NOTE
Patient's FSGs are controlled on current medication regimen.  Last A1c reviewed-   Lab Results   Component Value Date    HGBA1C 10.4 (H) 01/30/2024     Most recent fingerstick glucose reviewed-   Recent Labs   Lab 05/13/24  2120 05/14/24  0613 05/14/24  0849 05/14/24  1128   POCTGLUCOSE 175* 168* 156* 183*       Current correctional scale  Medium  Maintain anti-hyperglycemic dose as follows-   Antihyperglycemics (From admission, onward)    Start     Stop Route Frequency Ordered    04/17/24 1200  insulin aspart U-100 pen 4 Units         -- SubQ Every 6 hours 04/17/24 0938    04/13/24 2100  insulin detemir U-100 (Levemir) pen 27 Units         -- SubQ 2 times daily 04/13/24 0931        Hold Oral hypoglycemics while patient is in the hospital.  POCT glucose q6h

## 2024-05-14 NOTE — ASSESSMENT & PLAN NOTE
53 y.o. Black or  Female ESRD-HD M-W-F at [facility] presents to ED on 4/10/2024 with UTI.    Of note, the patient was initiated on RRT in February 2024 after being admitted with ALVARADO 2/2 iATN due to septic shock. Perm cath placed on 2/29/24. She was transferred to [facility] on 3/12. Deemed ESRD at [facility].  Nephrology consulted for inpatient ESRD-HD management    Assessment:   - Dialysis for metabolic clearance and volume management will be provided tomorrow with -1L as BP tolerates   - Continue to monitor intake and output  - Please avoid gadolinium, fleets, phos-based laxatives, NSAIDs  - Dialysis thrice weekly unless more urgent indications arise. Will evaluate RRT requirements Daily.    Anemia of ESRD   Recent Labs   Lab 05/10/24  0657   WBC 10.73   HGB 9.7*   HCT 31.6*          Lab Results   Component Value Date    FESATURATED 10 (L) 03/15/2024    FERRITIN 414 (H) 03/15/2024       - Goal in ESRD is Hgb of 10-11.   - c/w epo with HD hgb is 9.7. follow cbc     CKD/MBD:   - Ca 10.7 will adjust with HD.   - F/U PO4, Mg, Calcium. And albumin levels daily.   - Renal diet with protein intake goal 1.5 g/kg/d with 1 L fluid restriction   - Phos 4.2, continue Sevelamer.

## 2024-05-14 NOTE — ASSESSMENT & PLAN NOTE
"Patient has hyponatremia which is uncontrolled,We will aim to correct the sodium by 4-6mEq in 24 hours. We will monitor sodium Daily. The hyponatremia is due to ESRD. Discussed with nephrology, dialysate bath adjusted to account for and correct hyponatremia.  No results for input(s): "NA" in the last 24 hours.  "

## 2024-05-14 NOTE — PLAN OF CARE
Patient in bed, no complaints voiced at this time, safety measures in place, call light in reach, NADN, POC ongoing    Problem: Infection  Goal: Absence of Infection Signs and Symptoms  Outcome: Progressing     Problem: Skin Injury Risk Increased  Goal: Skin Health and Integrity  Outcome: Progressing     Problem: Adult Inpatient Plan of Care  Goal: Plan of Care Review  Outcome: Progressing  Goal: Patient-Specific Goal (Individualized)  Outcome: Progressing  Goal: Absence of Hospital-Acquired Illness or Injury  Outcome: Progressing  Goal: Optimal Comfort and Wellbeing  Outcome: Progressing  Goal: Readiness for Transition of Care  Outcome: Progressing     Problem: Bariatric Environmental Safety  Goal: Safety Maintained with Care  Outcome: Progressing     Problem: Device-Related Complication Risk (Hemodialysis)  Goal: Safe, Effective Therapy Delivery  Outcome: Progressing     Problem: Hemodynamic Instability (Hemodialysis)  Goal: Effective Tissue Perfusion  Outcome: Progressing     Problem: Infection (Hemodialysis)  Goal: Absence of Infection Signs and Symptoms  Outcome: Progressing     Problem: Diabetes Comorbidity  Goal: Blood Glucose Level Within Targeted Range  Outcome: Progressing     Problem: Adjustment to Illness (Sepsis/Septic Shock)  Goal: Optimal Coping  Outcome: Progressing     Problem: Glycemic Control Impaired (Sepsis/Septic Shock)  Goal: Blood Glucose Level Within Desired Range  Outcome: Progressing     Problem: Infection Progression (Sepsis/Septic Shock)  Goal: Absence of Infection Signs and Symptoms  Outcome: Progressing     Problem: Nutrition Impaired (Sepsis/Septic Shock)  Goal: Optimal Nutrition Intake  Outcome: Progressing     Problem: Fall Injury Risk  Goal: Absence of Fall and Fall-Related Injury  Outcome: Progressing     Problem: Adjustment to Illness (Chronic Kidney Disease)  Goal: Optimal Coping with Chronic Illness  Outcome: Progressing  Goal: Electrolyte Balance  Outcome: Progressing  Goal:  Fluid Balance  Outcome: Progressing     Problem: Electrolyte Imbalance (Chronic Kidney Disease)  Goal: Electrolyte Balance  Outcome: Progressing     Problem: Fluid Volume Excess (Chronic Kidney Disease)  Goal: Fluid Balance  Outcome: Progressing     Problem: Functional Decline (Chronic Kidney Disease)  Goal: Optimal Functional Ability  Outcome: Progressing     Problem: Hematologic Alteration (Chronic Kidney Disease)  Goal: Absence of Anemia Signs and Symptoms  Outcome: Progressing     Problem: Oral Intake Inadequate (Chronic Kidney Disease)  Goal: Optimal Oral Intake  Outcome: Progressing     Problem: Pain (Chronic Kidney Disease)  Goal: Acceptable Pain Control  Outcome: Progressing     Problem: Renal Function Impairment (Chronic Kidney Disease)  Goal: Minimize Renal Failure Effects  Outcome: Progressing     Problem: Restraint, Nonviolent  Goal: Absence of Harm or Injury  Outcome: Progressing     Problem: Wound  Goal: Optimal Coping  Outcome: Progressing  Goal: Optimal Functional Ability  Outcome: Progressing  Goal: Absence of Infection Signs and Symptoms  Outcome: Progressing  Goal: Improved Oral Intake  Outcome: Progressing  Goal: Optimal Pain Control and Function  Outcome: Progressing  Goal: Skin Health and Integrity  Outcome: Progressing  Goal: Optimal Wound Healing  Outcome: Progressing

## 2024-05-14 NOTE — PROGRESS NOTES
[facility] - Tele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 History was given from patient's daughter. She was undergoing dialysis today and noticed she was lethargic, less alert than usual self. Pt completed dialysis and still not acting herself. EMS was called, fever of a 100.  On chart review, she did have an episode of large volume emesis around 1700.  Per EMS, she had a slightly elevated temp 100.0°F, glucose 300s.     In the ED: UA 2+ leuks, >100 WBC, many bacteria, WBC 17, CT abd/pelvis concerning for cystitis. Given vanc/zosyn    Overview/Hospital Course:  Patient was admitted to Hospital Medicine service for medical management and evaluation of urosepsis. Patient was continued on vanc/zosyn. Vascular Neurology consulted concerning mental status change with the recommendation to initiate ASA 81 QD and to obtain an MRI to r/o new stroke. Imaging pending. Nephrology was consulted for regularly scheduled dialysis. Afternoon of 4/12, patient was unable to tolerate filtration of volume during dialsis 2/2 hypotension. Patient received 500cc bolus and was transferred back to floor after finishing the session without volume removed. Will monitor for signs of volume overload. Tailoring insulin regimen while uptitrating tube feeds to goal rate. Staph Epi in all 4 bottles; ID with recommendation to continue Vanc & de-escalate meropenem to ertapenem on 04/15 through 4/16. Patient completed IV course of UTI coverage. Patient tolerated volume removal well in dialysis  throughout the rest of her hospital stay. Pending discharge to LTACH pending bed availability. Patient without BM with one episode of vomiting overnight 4/17. KUB with bowel gas and low concern for obstruction with no transition point noted. Escalating bowel regimen. Pending placement as of 4/22. Pulm consult placed to evaluate for possible trach downsize & eventual decannulation to assist placement if safe. Trach capping trial per pulm for 48h and will assess for decannulation on Monday. DVT studies negative. Pulm successfully decannulated pt 4/30, IR exchanged TDC. Pending swallow study with SLP and waiting for dispo options. Pt failed swallow study, placement pending. Working with PT on mobilize pt to sitting in a chair to expand placement options. Pt successfully mobilized using sandee lift but required 2 people to do so. Discussing dispo plan with family    Interval History: TAURUSON. Pending placement      Objective:     Vital Signs (Most Recent):  Temp: 98.8 °F (37.1 °C) (05/14/24 1134)  Pulse: 91 (05/14/24 1134)  Resp: 18 (05/14/24 1134)  BP: 121/75 (05/14/24 1134)  SpO2: 97 % (05/14/24 1134) Vital Signs (24h Range):  Temp:  [97.5 °F (36.4 °C)-99 °F (37.2 °C)] 98.8 °F (37.1 °C)  Pulse:  [] 91  Resp:  [16-18] 18  SpO2:  [97 %-100 %] 97 %  BP: (110-125)/(66-89) 121/75     Weight: 122.5 kg (270 lb 1 oz)  Body mass index is 42.3 kg/m².    Intake/Output Summary (Last 24 hours) at 5/14/2024 1148  Last data filed at 5/13/2024 1800  Gross per 24 hour   Intake 2030 ml   Output 2090 ml   Net -60 ml         Physical Exam  Vitals and nursing note reviewed.   HENT:      Head: Normocephalic and atraumatic.   Neck:      Comments: Trach removed  Cardiovascular:      Rate and Rhythm: Normal rate and regular rhythm.      Heart sounds: Normal heart sounds.   Pulmonary:      Effort: Pulmonary effort is normal. No respiratory distress.      Breath sounds: Normal breath sounds.   Abdominal:      General: Abdomen is flat. There  is no distension.      Palpations: Abdomen is soft.      Tenderness: There is no abdominal tenderness.      Comments: PEG tube   Musculoskeletal:      Right lower leg: No edema.      Left lower leg: No edema.      Comments: Moves UEs spontaneously   Skin:     General: Skin is warm and dry.   Neurological:      General: No focal deficit present.      Mental Status: She is alert.      Comments: Nonverbal, not following commands             Significant Labs: All pertinent labs within the past 24 hours have been reviewed.    Significant Imaging: I have reviewed all pertinent imaging results/findings within the past 24 hours.    Assessment/Plan:      * Acute cystitis with hematuria  resolved    Pt presenting with AMS and worsening lethargy. Pt is non-verbal at baseline, does not follow commands, but was less responsive than normal. Pt found to have a fever. Urinary source suspected based off of urine and CT abd/pelvis. Pt has many prior resistant bacterial infections including urinary sources. Has prior with sensitivity to zosyn and has also improved off ED dose of vanc/zosyn. Lactic elevated a 3.8->3.49 prior to completion of fluid bolus 500ml.    Patient with new fever and tachycardia on 4/25. Low threshold to initiate additional infectious workup and repeat UA.     Plan  S/p course of vanc/ertapenem per ID. Course completed 4/16.  Daily cbc  Contact precautions    *on contact precautions indefinitely while patient still makes urine given pt incontinent per infection control    Complication of vascular dialysis catheter  Cath intermittently clogging, not resolving with cath-hedy, going for IR venogram 4/30 with possible exchange. S/p exchange with IR on 4/30      Constipation  RESOLVED with Bowel regimen  Patient without BM x5d. One episode of vomitus night of 4/17. Some concern for bowel obstruction. Tolerating continuous tube feeds at goal rate with 0cc on residuals. Physical exam with bowel sounds, soft nontender  abdomen. KUB without concern for obstruction with bowel gas, lack of transition point.    Patient now with regular bowel movements on bowel regimen.     Plan  - Miralax BID, Senna BID  - compazine PRN    Moderate malnutrition  Nutrition consulted. Most recent weight and BMI monitored-     Measurements:  Wt Readings from Last 1 Encounters:   05/08/24 122.5 kg (270 lb 1 oz)   Body mass index is 42.3 kg/m².    Patient has been screened and assessed by RD.    Malnutrition Type:  Context: acute illness or injury  Level: moderate    Malnutrition Characteristic Summary:  Weight Loss (Malnutrition): 10% in 6 months  Subcutaneous Fat (Malnutrition): mild depletion  Muscle Mass (Malnutrition): mild depletion  Fluid Accumulation (Malnutrition): mild    Interventions/Recommendations (treatment strategy):  1.    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 Physical therapy services has been scheduled. Continue all standard measures for pressure injury prevention and consult wound care for any wounds (chronic or acute).    ESRD (end stage renal disease) on dialysis  Creatine stable for now. BMP reviewed- noted Estimated Creatinine Clearance: 12.6 mL/min (A) (based on SCr of 7 mg/dL (H)). according to latest data. Based on current GFR, CKD stage is end stage.  Monitor UOP and serial BMP and adjust therapy as needed. Renally dose meds. Avoid nephrotoxic medications and procedures.    -- HD MWF  -- nephro following    Status post tracheostomy  Resolved, decannulated 4/30    Acute encephalopathy  Pt is non-verbal and does not  "follow commands at baseline. Vascular Neurology consulted given acute change from baseline. MRI with concern for evolution of prior strokes with noted differential of T2 hyperintensities including vasculitis vs demyelination. Discussed with Vascular Neurology; low concern for ongoing vasculitis/demyelination, likely represents typical evolution of prior infarcts.    Patient at baseline as of 4/22.     Plan  - STAT head imaging for concern of new change in mental status/focal deficit    Type 2 diabetes mellitus with hyperglycemia, with long-term current use of insulin  Patient's FSGs are controlled on current medication regimen.  Last A1c reviewed-   Lab Results   Component Value Date    HGBA1C 10.4 (H) 01/30/2024     Most recent fingerstick glucose reviewed-   Recent Labs   Lab 05/13/24  2120 05/14/24  0613 05/14/24  0849 05/14/24  1128   POCTGLUCOSE 175* 168* 156* 183*       Current correctional scale  Medium  Maintain anti-hyperglycemic dose as follows-   Antihyperglycemics (From admission, onward)      Start     Stop Route Frequency Ordered    04/17/24 1200  insulin aspart U-100 pen 4 Units         -- SubQ Every 6 hours 04/17/24 0938    04/13/24 2100  insulin detemir U-100 (Levemir) pen 27 Units         -- SubQ 2 times daily 04/13/24 0931          Hold Oral hypoglycemics while patient is in the hospital.  POCT glucose q6h    Hyponatremia  Patient has hyponatremia which is uncontrolled,We will aim to correct the sodium by 4-6mEq in 24 hours. We will monitor sodium Daily. The hyponatremia is due to ESRD. Discussed with nephrology, dialysate bath adjusted to account for and correct hyponatremia.  No results for input(s): "NA" in the last 24 hours.    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 Pt underwent extensive course, currently still healing from this, but no longer has wound vac. Pt's wound currently covered with bandage. Picture in media tabs    Plan  Wound " care        VTE Risk Mitigation (From admission, onward)           Ordered     heparin (porcine) injection 1,000 Units  As needed (PRN)         05/14/24 1138     heparin (porcine) injection 3,000 Units  As needed (PRN)         04/17/24 0825     heparin (porcine) injection 1,000 Units  As needed (PRN)         04/12/24 0951     heparin (porcine) injection 7,500 Units  Every 8 hours         04/11/24 0252                    Discharge Planning   ZEKE: 5/17/2024     Code Status: Full Code   Is the patient medically ready for discharge?: No    Reason for patient still in hospital (select all that apply): Pending disposition  Discharge Plan A: New Nursing Home placement - intermediate care facility                  Osito Dos Santos MD  Department of Hospital Medicine   [facility] - Telemetry Stepdown

## 2024-05-14 NOTE — PLAN OF CARE
VSS. Pt. complained of abdominal pain, MD made aware, see order.  Repositioned Q2, incontinence care provided. On continuous tube feed w/ TID FWF maintained. Fall precaution in place, call light within reach.     Problem: Infection  Goal: Absence of Infection Signs and Symptoms  Outcome: Progressing     Problem: Skin Injury Risk Increased  Goal: Skin Health and Integrity  Outcome: Progressing     Problem: Adult Inpatient Plan of Care  Goal: Plan of Care Review  Outcome: Progressing     Problem: Bariatric Environmental Safety  Goal: Safety Maintained with Care  Outcome: Progressing     Problem: Device-Related Complication Risk (Hemodialysis)  Goal: Safe, Effective Therapy Delivery  Outcome: Progressing     Problem: Infection (Hemodialysis)  Goal: Absence of Infection Signs and Symptoms  Outcome: Progressing     Problem: Diabetes Comorbidity  Goal: Blood Glucose Level Within Targeted Range  Outcome: Progressing     Problem: Adjustment to Illness (Sepsis/Septic Shock)  Goal: Optimal Coping  Outcome: Progressing     Problem: Adjustment to Illness (Chronic Kidney Disease)  Goal: Optimal Coping with Chronic Illness  Outcome: Progressing  Goal: Electrolyte Balance  Outcome: Progressing     Problem: Electrolyte Imbalance (Chronic Kidney Disease)  Goal: Electrolyte Balance  Outcome: Progressing

## 2024-05-14 NOTE — SUBJECTIVE & OBJECTIVE
Interval History: NAEON. Pending placement      Objective:     Vital Signs (Most Recent):  Temp: 98.8 °F (37.1 °C) (05/14/24 1134)  Pulse: 91 (05/14/24 1134)  Resp: 18 (05/14/24 1134)  BP: 121/75 (05/14/24 1134)  SpO2: 97 % (05/14/24 1134) Vital Signs (24h Range):  Temp:  [97.5 °F (36.4 °C)-99 °F (37.2 °C)] 98.8 °F (37.1 °C)  Pulse:  [] 91  Resp:  [16-18] 18  SpO2:  [97 %-100 %] 97 %  BP: (110-125)/(66-89) 121/75     Weight: 122.5 kg (270 lb 1 oz)  Body mass index is 42.3 kg/m².    Intake/Output Summary (Last 24 hours) at 5/14/2024 1148  Last data filed at 5/13/2024 1800  Gross per 24 hour   Intake 2030 ml   Output 2090 ml   Net -60 ml         Physical Exam  Vitals and nursing note reviewed.   HENT:      Head: Normocephalic and atraumatic.   Neck:      Comments: Trach removed  Cardiovascular:      Rate and Rhythm: Normal rate and regular rhythm.      Heart sounds: Normal heart sounds.   Pulmonary:      Effort: Pulmonary effort is normal. No respiratory distress.      Breath sounds: Normal breath sounds.   Abdominal:      General: Abdomen is flat. There is no distension.      Palpations: Abdomen is soft.      Tenderness: There is no abdominal tenderness.      Comments: PEG tube   Musculoskeletal:      Right lower leg: No edema.      Left lower leg: No edema.      Comments: Moves UEs spontaneously   Skin:     General: Skin is warm and dry.   Neurological:      General: No focal deficit present.      Mental Status: She is alert.      Comments: Nonverbal, not following commands             Significant Labs: All pertinent labs within the past 24 hours have been reviewed.    Significant Imaging: I have reviewed all pertinent imaging results/findings within the past 24 hours.

## 2024-05-14 NOTE — PT/OT/SLP PROGRESS
Occupational Therapy   Co-Treatment    Name: Sarah Saravia  MRN: [id number]  Admitting Diagnosis:  Acute cystitis with hematuria       Recommendations:     Discharge Recommendations: Moderate Intensity Therapy  Discharge Equipment Recommendations:  hospital bed, lift device, wheelchair  Barriers to discharge:  Other (Comment) (increased skilled (A)required)    Assessment:     Sarah Saravia is a 53 y.o. female with a medical diagnosis of Acute cystitis with hematuria.  She presents with the following performance deficits affecting function are weakness, impaired endurance, impaired self care skills, impaired functional mobility, decreased lower extremity function, decreased upper extremity function, impaired balance. Pt tolerated session well, which focused on continuing to improve OOB tolerance and endurance. She continues to require significant assistance for all mobility, but demo ability to tolerate x2 sit to stand trials with Total A and ~40% clearance. Pt would benefit from continued skilled acute OT services in order to maximize (I) and with ADLs and functional mobility to ensure safe return to PLOF in the least restrictive environment. OT recommending moderate intensity therapy once pt is medically appropriate for d/c.       Rehab Prognosis:Fair  ; patient would benefit from acute skilled OT services to address these deficits and reach maximum level of function.       Plan:     Patient to be seen 3 x/week to address the above listed problems via self-care/home management, therapeutic activities, therapeutic exercises, neuromuscular re-education  Plan of Care Expires: 06/13/24  Plan of Care Reviewed with: patient, significant other    Subjective   Pt's significant other present during some of session.   Chief Complaint: None stated   Patient/Family Comments/goals: return to PLOF   Pain/Comfort:  Pain Rating 1: other (see comments) (notrated)    Objective:     Communicated with: RN prior to session.  Patient  found HOB elevated with pressure relief boots, PEG Tube upon OT entry to room.    General Precautions: Standard, aspiration, aphasia, fall, contact, NPO    Orthopedic Precautions:N/A  Braces: N/A  Respiratory Status: Room air     Occupational Performance:     Bed Mobility:    Patient completed Scooting/Bridging with total assistance and 2 persons  Patient completed Supine to Sit with total assistance and 2 persons  Patient completed Sit to Supine with total assistance and 2 persons     Functional Mobility/Transfers:  Patient completed Sit <> Stand Transfer with total assistance  with  no assistive device    X2 trials   ~40% stand achieved     Activities of Daily Living:  Lower Body Dressing: total assistance to don socks   Toileting: total assistance found soiled in BM       AMPA 6 Click ADL: 8    Treatment & Education:   Pt sat EOB for ~12 min with CGA-Mod A for sitting balance   Pt and spouse educated on:   Importance of OOB activities to increase endurance and tolerance for increased participation in daily ADLs.   Utilizing the call bell to request for assistance with all functional mobility to ensure safety during hospital stay.      Family verbalized understanding and all questions were addressed within the scope of OT.     Patient left HOB elevated with all lines intact, call button in reach, and RN and spouse present    GOALS:   Multidisciplinary Problems       Occupational Therapy Goals          Problem: Occupational Therapy    Goal Priority Disciplines Outcome Interventions   Occupational Therapy Goal     OT, PT/OT Progressing    Description: Goals to be met by: 5/22/24     Patient will increase functional independence with ADLs by performing:    UE Dressing with Maximum Assistance.  Grooming while EOB with Maximum Assistance.  Sitting at edge of bed x5 minutes with Maximum Assistance.  Rolling to Bilateral with Moderate Assistance.   Supine to sit with Maximum Assistance.                         Time  Tracking:     OT Date of Treatment: 05/14/24  OT Start Time: 0852  OT Stop Time: 0912  OT Total Time (min): 20 min    Billable Minutes:Therapeutic Exercise 15    OT/JOSÉ: OT     Number of JOSÉ visits since last OT visit: 1    5/14/2024   Co-treatment performed due to patient's multiple deficits requiring two skilled therapists to appropriately and safely assess patient's strength, endurance, functional mobility, and ADL performance while facilitating functional tasks in addition to accommodating for patient's activity tolerance and medical acuity.

## 2024-05-14 NOTE — SUBJECTIVE & OBJECTIVE
Interval History: patient had HD yesterday and bp dropped to 89/63 per RN note/ heparin lock used for cath . Net UF yesterday 1.4L instead of 2L because BP dropped. Patient feels ok today     Review of patient's allergies indicates:  No Known Allergies  Current Facility-Administered Medications   Medication Frequency    ascorbic acid (vitamin C) tablet 500 mg BID    aspirin chewable tablet 81 mg Daily    atorvastatin tablet 40 mg Daily    dextrose 10 % infusion Continuous PRN    dextrose 10% bolus 125 mL 125 mL PRN    dextrose 10% bolus 250 mL 250 mL PRN    diphenhydrAMINE-zinc acetate 2-0.1% cream TID PRN    epoetin merry injection 6,100 Units Every Mon, Wed, Fri    glucagon (human recombinant) injection 1 mg PRN    glucose chewable tablet 16 g PRN    glucose chewable tablet 24 g PRN    heparin (porcine) injection 1,000 Units PRN    heparin (porcine) injection 3,000 Units PRN    heparin (porcine) injection 7,500 Units Q8H    insulin aspart U-100 pen 4 Units Q6H    insulin detemir U-100 (Levemir) pen 27 Units BID    metoprolol tartrate (LOPRESSOR) tablet 25 mg BID    naloxone 0.4 mg/mL injection 0.02 mg PRN    ondansetron injection 4 mg Q6H PRN    pantoprazole suspension 40 mg Daily    psyllium husk (aspartame) 3.4 gram packet 1 packet Daily    sodium chloride 0.9% flush 10 mL Q12H PRN    zinc sulfate capsule 220 mg Daily       Objective:     Vital Signs (Most Recent):  Temp: 99 °F (37.2 °C) (05/14/24 0823)  Pulse: 101 (05/14/24 0823)  Resp: 18 (05/14/24 0823)  BP: 113/66 (05/14/24 0823)  SpO2: 97 % (05/14/24 0823) Vital Signs (24h Range):  Temp:  [97.5 °F (36.4 °C)-99 °F (37.2 °C)] 99 °F (37.2 °C)  Pulse:  [] 101  Resp:  [16-18] 18  SpO2:  [97 %-100 %] 97 %  BP: ()/(61-89) 113/66     Weight: 122.5 kg (270 lb 1 oz) (05/08/24 1241)  Body mass index is 42.3 kg/m².  Body surface area is 2.41 meters squared.    I/O last 3 completed shifts:  In: 2030 [Other:650; NG/GT:1380]  Out: 2090 [Other:2090]     Physical  Exam  Vitals reviewed.   Constitutional:       Appearance: She is obese.   HENT:      Head: Normocephalic and atraumatic.      Mouth/Throat:      Mouth: Mucous membranes are moist.   Eyes:      Conjunctiva/sclera: Conjunctivae normal.      Pupils: Pupils are equal, round, and reactive to light.   Cardiovascular:      Rate and Rhythm: Normal rate and regular rhythm.      Heart sounds: Normal heart sounds.   Pulmonary:      Effort: Pulmonary effort is normal.      Breath sounds: Normal breath sounds.   Abdominal:      General: Bowel sounds are normal.      Palpations: Abdomen is soft.      Tenderness: There is no abdominal tenderness. There is no right CVA tenderness, left CVA tenderness or guarding.   Musculoskeletal:         General: No swelling.      Right lower leg: No edema.      Left lower leg: No edema.   Skin:     Coloration: Skin is not jaundiced.      Findings: No rash.          Significant Labs:  CBC:   Recent Labs   Lab 05/10/24  0657   WBC 10.73   RBC 3.60*   HGB 9.7*   HCT 31.6*      MCV 88   MCH 26.9*   MCHC 30.7*     CMP:   Recent Labs   Lab 05/13/24  0510   *   CALCIUM 10.7*   ALBUMIN 3.4*   *   K 4.3   CO2 19*   CL 92*   BUN 78*   CREATININE 7.0*        Significant Imaging:  Reviewed

## 2024-05-14 NOTE — PT/OT/SLP PROGRESS
Physical Therapy Co-Treatment  Co-treatment with OT for maximal pt participation, safety, and activity tolerance    Patient Name:  Sarah Saravia   MRN:  [id number]  Admitting Diagnosis:  Acute cystitis with hematuria   Recent Surgery: * No surgery found *    Admit Date: 4/10/2024  Length of Stay: 33 days    Recommendations:     Discharge Recommendations:  Moderate Intensity Therapy  Discharge Equipment Recommendations: lift device, hospital bed, wheelchair   Justification for Equipment: Patient requires a hospital bed for positioning of the body in ways that are not feasible with an ordinary bed. The patient requires special positioning for pain relief, limited mobility, and/or being unable to independently make changes in body position without the use of a hospital bed. Pillows and wedges will not be adequate for resolving these positional issues.     Patient has a mobility limitation that significantly impairs their ability to participate in one or more mobility related activities of daily living in customary locations in the home. The mobility limitation cannot be sufficiently resolved by the use of a cane or walker. The use of a manual wheelchair will greatly improve the patient's ability to participate in MRADLs. The patient will use the wheelchair on a regular basis at home. They have expressed their willingness to use a manual wheelchair in the home, and have a caregiver who is available and willing to assist with the wheelchair if needed.  Barriers to discharge: Evolving Clinical Presentation    Assessment:     Sarah Saravia is a 53 y.o. female admitted with a medical diagnosis of Acute cystitis with hematuria.  She presents with the following impairments/functional limitations:  weakness, impaired functional mobility, impaired cognition, decreased safety awareness, impaired endurance, impaired balance, impaired self care skills, decreased lower extremity function.     Pt nonverbal but largely cooperative  "with therapy, focus today on functional mobility. Pt able to sit EOB and attempts stands with significant assistance, managing ~ 40% stand for 2 attempts, some LE activation noted during standing attempts.    Rehab Prognosis: Poor; patient would benefit from acute skilled PT services to address these deficits and reach maximum level of function.    Recent Surgery: * No surgery found *      Treatment Tolerated: Fair    Highest level of mobility achieved this visit: 40% stand at EOB x 2 trials with total A    Activity with RN/PCT: transfer with four+ person assist    Plan:     During this hospitalization, patient to be seen 3 x/week to address the identified rehab impairments via therapeutic activities, therapeutic exercises, neuromuscular re-education, wheelchair management/training and progress toward the following goals:    Plan of Care Expires:  05/22/24    Subjective     RN notified prior to session. No family present upon PT entrance into room.    Chief Complaint: none verbalized but does grunt with mobility  Patient/Family Comments/goals: SO reports patient was standing prior to initial hospitalization in January  Pain/Comfort:  Pain Rating 1:  (unrated)      Objective:     Additional staff present: MAGALI Kwong    Patient found HOB elevated with: pressure relief boots, PEG Tube   Cognition:   Flat affect  Command following: Follows one-step verbal commands most of the time  Fluency: non-verbal  General Precautions: Standard, aspiration, aphasia, fall, contact, NPO   Orthopedic Precautions:N/A   Braces: N/A   Body mass index is 42.3 kg/m².  Oxygen Device: Room Air    Vitals: /66 (Patient Position: Lying)   Pulse 101   Temp 99 °F (37.2 °C) (Oral)   Resp 18   Ht 5' 7" (1.702 m)   Wt 122.5 kg (270 lb 1 oz)   SpO2 97%   BMI 42.30 kg/m²     Outcome Measures:  AM-PAC 6 CLICK MOBILITY  Turning over in bed (including adjusting bedclothes, sheets and blankets)?: 2  Sitting down on and standing up from a chair " with arms (e.g., wheelchair, bedside commode, etc.): 1  Moving from lying on back to sitting on the side of the bed?: 2  Moving to and from a bed to a chair (including a wheelchair)?: 1  Need to walk in hospital room?: 1  Climbing 3-5 steps with a railing?: 1  Basic Mobility Total Score: 8     Functional Mobility:    Bed Mobility:   Scooting to HOB: total assistance of 2 persons  Supine to Sit: total assistance of 2 persons; from R side of bed  Scooting anteriorly to EOB to have both feet planted on floor: total assistance of 2 persons  Sit to Supine: total assistance of 2 persons; to L side of bed    Sitting Balance at Edge of Bed:  Assistance Level Required: Contact Guard Assistance to Moderate Assistance  Time: 12 min  Postural deviations noted: slouched posture and rounded shoulders  Encouraged: upright sitting    Transfers:   Sit > Stand Transfer: total assistance with no assistive device   Stand > Sit Transfer: total assistance with no assistive device   x2 trials from EOB  40% stand achieved  DEB LE activation noted    Standing Balance: Full stand not achieved    Education:  Time provided for education, counseling and discussion of health disposition in regards to patient's current status  PT role in POC to address current functional deficits  Pt educated on proper body mechanics, safety techniques, and energy conservation with PT facilitation and cueing throughout session    Patient left supine with call button in reach and RN and SO present.    GOALS:   Multidisciplinary Problems       Physical Therapy Goals          Problem: Physical Therapy    Goal Priority Disciplines Outcome Goal Variances Interventions   Physical Therapy Goal     PT, PT/OT Progressing     Description: Goals to be met by: 24   Goals remain appropriate 5/3/2024 to be met by 24  Goals updated 2024 to be met by 24    Patient will increase functional independence with mobility by performin. Supine to sit with  Maximum Assistance  2. Sit to supine with Maximum Assistance  3. Rolling to Left and Right with Moderate Assistance.  4. Sitting at edge of bed 5 minutes with Moderate Assistance- MET 04/30, monitor consistency  5. Lower extremity exercise program x20 reps per handout, with assistance as needed                       Time Tracking:     PT Received On: 05/14/24  PT Start Time: 0852     PT Stop Time: 0910  PT Total Time (min): 18 min     Billable Minutes:   Therapeutic Activity 15 min    Treatment Type: Treatment  PT/PTA: PT       Isaias Patterson, PT, DPT  5/14/2024

## 2024-05-15 LAB
ALBUMIN SERPL BCP-MCNC: 3.4 G/DL (ref 3.5–5.2)
ANION GAP SERPL CALC-SCNC: 13 MMOL/L (ref 8–16)
BUN SERPL-MCNC: 22 MG/DL (ref 6–20)
CALCIUM SERPL-MCNC: 9 MG/DL (ref 8.7–10.5)
CHLORIDE SERPL-SCNC: 92 MMOL/L (ref 95–110)
CO2 SERPL-SCNC: 25 MMOL/L (ref 23–29)
CREAT SERPL-MCNC: 2.6 MG/DL (ref 0.5–1.4)
EST. GFR  (NO RACE VARIABLE): 21.4 ML/MIN/1.73 M^2
GLUCOSE SERPL-MCNC: 113 MG/DL (ref 70–110)
PHOSPHATE SERPL-MCNC: 1.6 MG/DL (ref 2.7–4.5)
POCT GLUCOSE: 141 MG/DL (ref 70–110)
POCT GLUCOSE: 153 MG/DL (ref 70–110)
POCT GLUCOSE: 155 MG/DL (ref 70–110)
POCT GLUCOSE: 157 MG/DL (ref 70–110)
POCT GLUCOSE: 168 MG/DL (ref 70–110)
POCT GLUCOSE: 170 MG/DL (ref 70–110)
POCT GLUCOSE: 82 MG/DL (ref 70–110)
POTASSIUM SERPL-SCNC: 3.4 MMOL/L (ref 3.5–5.1)
SODIUM SERPL-SCNC: 130 MMOL/L (ref 136–145)

## 2024-05-15 PROCEDURE — 63600175 PHARM REV CODE 636 W HCPCS: Performed by: NURSE PRACTITIONER

## 2024-05-15 PROCEDURE — 27000207 HC ISOLATION

## 2024-05-15 PROCEDURE — 25000003 PHARM REV CODE 250

## 2024-05-15 PROCEDURE — 80100014 HC HEMODIALYSIS 1:1

## 2024-05-15 PROCEDURE — 63600175 PHARM REV CODE 636 W HCPCS

## 2024-05-15 PROCEDURE — 80069 RENAL FUNCTION PANEL: CPT

## 2024-05-15 PROCEDURE — 20600001 HC STEP DOWN PRIVATE ROOM

## 2024-05-15 PROCEDURE — 63600175 PHARM REV CODE 636 W HCPCS: Mod: JZ | Performed by: NURSE PRACTITIONER

## 2024-05-15 PROCEDURE — 99232 SBSQ HOSP IP/OBS MODERATE 35: CPT | Mod: ,,, | Performed by: INTERNAL MEDICINE

## 2024-05-15 PROCEDURE — 25000242 PHARM REV CODE 250 ALT 637 W/ HCPCS

## 2024-05-15 PROCEDURE — 36415 COLL VENOUS BLD VENIPUNCTURE: CPT

## 2024-05-15 PROCEDURE — 97530 THERAPEUTIC ACTIVITIES: CPT | Mod: CQ

## 2024-05-15 RX ADMIN — PSYLLIUM HUSK 1 PACKET: 3.4 POWDER ORAL at 08:05

## 2024-05-15 RX ADMIN — ATORVASTATIN CALCIUM 40 MG: 40 TABLET, FILM COATED ORAL at 08:05

## 2024-05-15 RX ADMIN — Medication 500 MG: at 09:05

## 2024-05-15 RX ADMIN — INSULIN ASPART 4 UNITS: 100 INJECTION, SOLUTION INTRAVENOUS; SUBCUTANEOUS at 12:05

## 2024-05-15 RX ADMIN — ZINC SULFATE 220 MG (50 MG) CAPSULE 220 MG: CAPSULE at 08:05

## 2024-05-15 RX ADMIN — ERYTHROPOIETIN 6100 UNITS: 10000 INJECTION, SOLUTION INTRAVENOUS; SUBCUTANEOUS at 11:05

## 2024-05-15 RX ADMIN — HEPARIN SODIUM 7500 UNITS: 5000 INJECTION INTRAVENOUS; SUBCUTANEOUS at 06:05

## 2024-05-15 RX ADMIN — Medication 500 MG: at 08:05

## 2024-05-15 RX ADMIN — INSULIN DETEMIR 27 UNITS: 100 INJECTION, SOLUTION SUBCUTANEOUS at 09:05

## 2024-05-15 RX ADMIN — PANTOPRAZOLE SODIUM 40 MG: 40 GRANULE, DELAYED RELEASE ORAL at 08:05

## 2024-05-15 RX ADMIN — METOPROLOL TARTRATE 25 MG: 25 TABLET, FILM COATED ORAL at 09:05

## 2024-05-15 RX ADMIN — HEPARIN SODIUM 7500 UNITS: 5000 INJECTION INTRAVENOUS; SUBCUTANEOUS at 09:05

## 2024-05-15 RX ADMIN — INSULIN ASPART 4 UNITS: 100 INJECTION, SOLUTION INTRAVENOUS; SUBCUTANEOUS at 11:05

## 2024-05-15 RX ADMIN — HEPARIN SODIUM 3000 UNITS: 1000 INJECTION, SOLUTION INTRAVENOUS; SUBCUTANEOUS at 03:05

## 2024-05-15 RX ADMIN — INSULIN ASPART 4 UNITS: 100 INJECTION, SOLUTION INTRAVENOUS; SUBCUTANEOUS at 05:05

## 2024-05-15 RX ADMIN — HEPARIN SODIUM 7500 UNITS: 5000 INJECTION INTRAVENOUS; SUBCUTANEOUS at 01:05

## 2024-05-15 RX ADMIN — ASPIRIN 81 MG CHEWABLE TABLET 81 MG: 81 TABLET CHEWABLE at 08:05

## 2024-05-15 RX ADMIN — METOPROLOL TARTRATE 25 MG: 25 TABLET, FILM COATED ORAL at 08:05

## 2024-05-15 NOTE — PROGRESS NOTES
[facility] - Telemetry Stepdown  Nephrology  Progress Note    Patient Name: Sarah Saravia  MRN: [id number]  Admission Date: 4/10/2024  Hospital Length of Stay: 34 days  Attending Provider: Soco Erickson MD   Primary Care Physician: Deanna, Primary Doctor  Principal Problem:Acute cystitis with hematuria    Subjective:     HPI: The patient is a 53 y.o. 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 The patient is unable to provide adequate history. Additional history and patient information was obtained from patient's daughter, past medical records and ER records. Per chart, she was undergoing dialysis Wednesday and was noted to be more lethargic than usual despite completing her HD session. EMS was called, fever of a 100. In the ED, UA 2+ leuks, >100 WBC, many bacteria, WBC 17, CT abd/pelvis concerning for cystitis. Given vanc/zosyn and admitted.     Of note, the patient was initiated on RRT in February 2024 after being admitted with ALVARADO 2/2 iATN due to septic shock. Perm cath placed on 2/29/24. She was transferred to [facility] on 3/12. Nephrology consulted for management of ESRD and HD treatment.      Interval History: patient had HD this morning net UF was one liter.     Review of patient's allergies indicates:  No Known Allergies  Current Facility-Administered Medications   Medication Frequency    0.9%  NaCl infusion Once    acetaminophen oral solution 650 mg Q6H PRN    ascorbic acid (vitamin C) tablet 500 mg BID    aspirin chewable tablet 81 mg Daily    atorvastatin tablet 40 mg Daily    dextrose 10 % infusion Continuous PRN    dextrose 10% bolus 125 mL 125 mL PRN    dextrose 10% bolus 250 mL 250 mL PRN    diphenhydrAMINE-zinc acetate 2-0.1% cream TID PRN    epoetin merry injection 6,100 Units Every Mon, Wed, Fri    glucagon (human recombinant) injection 1 mg  PRN    glucose chewable tablet 16 g PRN    glucose chewable tablet 24 g PRN    heparin (porcine) injection 1,000 Units PRN    heparin (porcine) injection 1,000 Units PRN    heparin (porcine) injection 3,000 Units PRN    heparin (porcine) injection 7,500 Units Q8H    insulin aspart U-100 pen 4 Units Q6H    insulin detemir U-100 (Levemir) pen 27 Units BID    metoprolol tartrate (LOPRESSOR) tablet 25 mg BID    naloxone 0.4 mg/mL injection 0.02 mg PRN    ondansetron injection 4 mg Q6H PRN    pantoprazole suspension 40 mg Daily    psyllium husk (aspartame) 3.4 gram packet 1 packet Daily    sodium chloride 0.9% flush 10 mL Q12H PRN    zinc sulfate capsule 220 mg Daily       Objective:     Vital Signs (Most Recent):  Temp: 99 °F (37.2 °C) (05/15/24 1115)  Pulse: 97 (05/15/24 1115)  Resp: 18 (05/15/24 0817)  BP: 113/72 (05/15/24 1115)  SpO2: 98 % (05/15/24 1115) Vital Signs (24h Range):  Temp:  [97.5 °F (36.4 °C)-99.2 °F (37.3 °C)] 99 °F (37.2 °C)  Pulse:  [] 97  Resp:  [17-18] 18  SpO2:  [96 %-100 %] 98 %  BP: ()/(59-91) 113/72     Weight: 122.5 kg (270 lb 1 oz) (05/08/24 1241)  Body mass index is 42.3 kg/m².  Body surface area is 2.41 meters squared.    I/O last 3 completed shifts:  In: 3960 [Other:500; NG/GT:3460]  Out: 1500 [Other:1500]     Physical Exam  Vitals reviewed.   Constitutional:       Appearance: She is obese.   HENT:      Head: Normocephalic and atraumatic.      Mouth/Throat:      Mouth: Mucous membranes are moist.   Eyes:      Conjunctiva/sclera: Conjunctivae normal.      Pupils: Pupils are equal, round, and reactive to light.   Cardiovascular:      Rate and Rhythm: Normal rate and regular rhythm.      Heart sounds: Normal heart sounds.   Pulmonary:      Effort: Pulmonary effort is normal.      Breath sounds: Normal breath sounds.   Abdominal:      General: Bowel sounds are normal.      Palpations: Abdomen is soft.      Tenderness: There is no abdominal tenderness. There is no right CVA  tenderness, left CVA tenderness or guarding.   Musculoskeletal:         General: No swelling.      Right lower leg: No edema.      Left lower leg: No edema.   Skin:     Coloration: Skin is not jaundiced.      Findings: No rash.          Significant Labs:  CBC:   Recent Labs   Lab 05/10/24  0657   WBC 10.73   RBC 3.60*   HGB 9.7*   HCT 31.6*      MCV 88   MCH 26.9*   MCHC 30.7*     CMP:   Recent Labs   Lab 05/15/24  0516   *   CALCIUM 9.0   ALBUMIN 3.4*   *   K 3.4*   CO2 25   CL 92*   BUN 22*   CREATININE 2.6*        Significant Imaging:  Reviewed   Assessment/Plan:     Renal/  ESRD (end stage renal disease) on dialysis  53 y.o. Black or  Female ESRD-HD M-W-F at [facility] presents to ED on 4/10/2024 with UTI.    Of note, the patient was initiated on RRT in February 2024 after being admitted with ALVARADO 2/2 iATN due to septic shock. Perm cath placed on 2/29/24. She was transferred to [facility] on 3/12. Deemed ESRD at [facility].  Nephrology consulted for inpatient ESRD-HD management    Assessment:   - tolerated HD this am. Next HD likely will be on Thursday unless acute need for HD arises   - Continue to monitor intake and output  - Please avoid gadolinium, fleets, phos-based laxatives, NSAIDs  - Dialysis thrice weekly unless more urgent indications arise. Will evaluate RRT requirements Daily.    Anemia of ESRD hgb 9.7 c/w EPO with HD  Recent Labs   Lab 05/10/24  0657   WBC 10.73   HGB 9.7*   HCT 31.6*          Lab Results   Component Value Date    FESATURATED 10 (L) 03/15/2024    FERRITIN 414 (H) 03/15/2024       - Goal in ESRD is Hgb of 10-11.       CKD/MBD:   - hypercalcemia resolved with HD was 10.7 down to 9 with low ca bath. Phos low but labs were drawn while patient on dialysis likely not accurate recheck RFP in am. Agree with holding sevelamer for now   - F/U PO4, Mg, Calcium. And albumin levels daily.       Endocrine  Hyponatremia  Free water restriction NA better up to 130   Will  continue to adjust with HD         Zaid Carlos MD  Nephrology  [facility] - Telemetry Stepdown   risk factors

## 2024-05-15 NOTE — SUBJECTIVE & OBJECTIVE
Interval History: patient had HD this morning net UF was one liter.     Review of patient's allergies indicates:  No Known Allergies  Current Facility-Administered Medications   Medication Frequency    0.9%  NaCl infusion Once    acetaminophen oral solution 650 mg Q6H PRN    ascorbic acid (vitamin C) tablet 500 mg BID    aspirin chewable tablet 81 mg Daily    atorvastatin tablet 40 mg Daily    dextrose 10 % infusion Continuous PRN    dextrose 10% bolus 125 mL 125 mL PRN    dextrose 10% bolus 250 mL 250 mL PRN    diphenhydrAMINE-zinc acetate 2-0.1% cream TID PRN    epoetin merry injection 6,100 Units Every Mon, Wed, Fri    glucagon (human recombinant) injection 1 mg PRN    glucose chewable tablet 16 g PRN    glucose chewable tablet 24 g PRN    heparin (porcine) injection 1,000 Units PRN    heparin (porcine) injection 1,000 Units PRN    heparin (porcine) injection 3,000 Units PRN    heparin (porcine) injection 7,500 Units Q8H    insulin aspart U-100 pen 4 Units Q6H    insulin detemir U-100 (Levemir) pen 27 Units BID    metoprolol tartrate (LOPRESSOR) tablet 25 mg BID    naloxone 0.4 mg/mL injection 0.02 mg PRN    ondansetron injection 4 mg Q6H PRN    pantoprazole suspension 40 mg Daily    psyllium husk (aspartame) 3.4 gram packet 1 packet Daily    sodium chloride 0.9% flush 10 mL Q12H PRN    zinc sulfate capsule 220 mg Daily       Objective:     Vital Signs (Most Recent):  Temp: 99 °F (37.2 °C) (05/15/24 1115)  Pulse: 97 (05/15/24 1115)  Resp: 18 (05/15/24 0817)  BP: 113/72 (05/15/24 1115)  SpO2: 98 % (05/15/24 1115) Vital Signs (24h Range):  Temp:  [97.5 °F (36.4 °C)-99.2 °F (37.3 °C)] 99 °F (37.2 °C)  Pulse:  [] 97  Resp:  [17-18] 18  SpO2:  [96 %-100 %] 98 %  BP: ()/(59-91) 113/72     Weight: 122.5 kg (270 lb 1 oz) (05/08/24 1241)  Body mass index is 42.3 kg/m².  Body surface area is 2.41 meters squared.    I/O last 3 completed shifts:  In: 3960 [Other:500; NG/GT:3460]  Out: 1500 [Other:1500]      Physical Exam  Vitals reviewed.   Constitutional:       Appearance: She is obese.   HENT:      Head: Normocephalic and atraumatic.      Mouth/Throat:      Mouth: Mucous membranes are moist.   Eyes:      Conjunctiva/sclera: Conjunctivae normal.      Pupils: Pupils are equal, round, and reactive to light.   Cardiovascular:      Rate and Rhythm: Normal rate and regular rhythm.      Heart sounds: Normal heart sounds.   Pulmonary:      Effort: Pulmonary effort is normal.      Breath sounds: Normal breath sounds.   Abdominal:      General: Bowel sounds are normal.      Palpations: Abdomen is soft.      Tenderness: There is no abdominal tenderness. There is no right CVA tenderness, left CVA tenderness or guarding.   Musculoskeletal:         General: No swelling.      Right lower leg: No edema.      Left lower leg: No edema.   Skin:     Coloration: Skin is not jaundiced.      Findings: No rash.          Significant Labs:  CBC:   Recent Labs   Lab 05/10/24  0657   WBC 10.73   RBC 3.60*   HGB 9.7*   HCT 31.6*      MCV 88   MCH 26.9*   MCHC 30.7*     CMP:   Recent Labs   Lab 05/15/24  0516   *   CALCIUM 9.0   ALBUMIN 3.4*   *   K 3.4*   CO2 25   CL 92*   BUN 22*   CREATININE 2.6*        Significant Imaging:  Reviewed

## 2024-05-15 NOTE — SUBJECTIVE & OBJECTIVE
Interval History: NAEON. Abdominal pain improved      Objective:     Vital Signs (Most Recent):  Temp: 99 °F (37.2 °C) (05/15/24 1115)  Pulse: 97 (05/15/24 1115)  Resp: 18 (05/15/24 0817)  BP: 113/72 (05/15/24 1115)  SpO2: 98 % (05/15/24 1115) Vital Signs (24h Range):  Temp:  [97.5 °F (36.4 °C)-99.2 °F (37.3 °C)] 99 °F (37.2 °C)  Pulse:  [] 97  Resp:  [17-18] 18  SpO2:  [96 %-100 %] 98 %  BP: ()/(59-91) 113/72     Weight: 122.5 kg (270 lb 1 oz)  Body mass index is 42.3 kg/m².    Intake/Output Summary (Last 24 hours) at 5/15/2024 1325  Last data filed at 5/15/2024 0800  Gross per 24 hour   Intake 3560 ml   Output 1500 ml   Net 2060 ml         Physical Exam  Vitals and nursing note reviewed.   HENT:      Head: Normocephalic and atraumatic.   Neck:      Comments: Trach removed  Cardiovascular:      Rate and Rhythm: Normal rate and regular rhythm.      Heart sounds: Normal heart sounds.   Pulmonary:      Effort: Pulmonary effort is normal. No respiratory distress.      Breath sounds: Normal breath sounds.   Abdominal:      General: Abdomen is flat. There is no distension.      Palpations: Abdomen is soft.      Tenderness: There is no abdominal tenderness.      Comments: PEG tube   Musculoskeletal:      Right lower leg: No edema.      Left lower leg: No edema.      Comments: Moves UEs spontaneously   Skin:     General: Skin is warm and dry.   Neurological:      General: No focal deficit present.      Mental Status: She is alert.      Comments: Nonverbal, not following commands             Significant Labs: All pertinent labs within the past 24 hours have been reviewed.    Significant Imaging: I have reviewed all pertinent imaging results/findings within the past 24 hours.

## 2024-05-15 NOTE — PROGRESS NOTES
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 History was given from patient's daughter. She was undergoing dialysis today and noticed she was lethargic, less alert than usual self. Pt completed dialysis and still not acting herself. EMS was called, fever of a 100.  On chart review, she did have an episode of large volume emesis around 1700.  Per EMS, she had a slightly elevated temp 100.0°F, glucose 300s.     In the ED: UA 2+ leuks, >100 WBC, many bacteria, WBC 17, CT abd/pelvis concerning for cystitis. Given vanc/zosyn    Overview/Hospital Course:  Patient was admitted to Hospital Medicine service for medical management and evaluation of urosepsis. Patient was continued on vanc/zosyn. Vascular Neurology consulted concerning mental status change with the recommendation to initiate ASA 81 QD and to obtain an MRI to r/o new stroke. Imaging pending. Nephrology was consulted for regularly scheduled dialysis. Afternoon of 4/12, patient was unable to tolerate filtration of volume during dialsis 2/2 hypotension. Patient received 500cc bolus and was transferred back to floor after finishing the session without volume removed. Will monitor for signs of volume overload. Tailoring insulin regimen while uptitrating tube feeds to goal rate. Staph Epi in all 4 bottles; ID with recommendation to continue Vanc & de-escalate meropenem to ertapenem on 04/15 through 4/16. Patient completed IV course of UTI coverage. Patient tolerated volume removal well in dialysis  throughout the rest of her hospital stay. Pending discharge to LTACH pending bed availability. Patient without BM with one episode of vomiting overnight 4/17. KUB with bowel gas and low concern for obstruction with no transition point noted. Escalating bowel regimen. Pending placement as of 4/22. Pulm consult placed to evaluate for possible trach downsize & eventual decannulation to assist placement if safe. Trach capping trial per pulm for 48h and will assess for decannulation on Monday. DVT studies negative. Pulm successfully decannulated pt 4/30, IR exchanged TDC. Pending swallow study with SLP and waiting for dispo options. Pt failed swallow study, placement pending. Working with PT on mobilize pt to sitting in a chair to expand placement options. Pt successfully mobilized using sandee lift but required 2 people to do so. Discussing dispo plan with family, likely d/c home if HD, DME needs able to be met    Interval History: NAEON. Abdominal pain improved      Objective:     Vital Signs (Most Recent):  Temp: 99 °F (37.2 °C) (05/15/24 1115)  Pulse: 97 (05/15/24 1115)  Resp: 18 (05/15/24 0817)  BP: 113/72 (05/15/24 1115)  SpO2: 98 % (05/15/24 1115) Vital Signs (24h Range):  Temp:  [97.5 °F (36.4 °C)-99.2 °F (37.3 °C)] 99 °F (37.2 °C)  Pulse:  [] 97  Resp:  [17-18] 18  SpO2:  [96 %-100 %] 98 %  BP: ()/(59-91) 113/72     Weight: 122.5 kg (270 lb 1 oz)  Body mass index is 42.3 kg/m².    Intake/Output Summary (Last 24 hours) at 5/15/2024 1325  Last data filed at 5/15/2024 0800  Gross per 24 hour   Intake 3560 ml   Output 1500 ml   Net 2060 ml         Physical Exam  Vitals and nursing note reviewed.   HENT:      Head: Normocephalic and atraumatic.   Neck:      Comments: Trach removed  Cardiovascular:      Rate and Rhythm: Normal rate and regular rhythm.      Heart sounds: Normal heart sounds.   Pulmonary:      Effort: Pulmonary effort is normal. No respiratory distress.      Breath sounds: Normal breath  sounds.   Abdominal:      General: Abdomen is flat. There is no distension.      Palpations: Abdomen is soft.      Tenderness: There is no abdominal tenderness.      Comments: PEG tube   Musculoskeletal:      Right lower leg: No edema.      Left lower leg: No edema.      Comments: Moves UEs spontaneously   Skin:     General: Skin is warm and dry.   Neurological:      General: No focal deficit present.      Mental Status: She is alert.      Comments: Nonverbal, not following commands             Significant Labs: All pertinent labs within the past 24 hours have been reviewed.    Significant Imaging: I have reviewed all pertinent imaging results/findings within the past 24 hours.    Assessment/Plan:      * Acute cystitis with hematuria  resolved    Pt presenting with AMS and worsening lethargy. Pt is non-verbal at baseline, does not follow commands, but was less responsive than normal. Pt found to have a fever. Urinary source suspected based off of urine and CT abd/pelvis. Pt has many prior resistant bacterial infections including urinary sources. Has prior with sensitivity to zosyn and has also improved off ED dose of vanc/zosyn. Lactic elevated a 3.8->3.49 prior to completion of fluid bolus 500ml.    Patient with new fever and tachycardia on 4/25. Low threshold to initiate additional infectious workup and repeat UA.     Plan  S/p course of vanc/ertapenem per ID. Course completed 4/16.  Daily cbc  Contact precautions    *on contact precautions indefinitely while patient still makes urine given pt incontinent per infection control    Complication of vascular dialysis catheter  Cath intermittently clogging, not resolving with cath-hedy, going for IR venogram 4/30 with possible exchange. S/p exchange with IR on 4/30      Constipation  RESOLVED with Bowel regimen  Patient without BM x5d. One episode of vomitus night of 4/17. Some concern for bowel obstruction. Tolerating continuous tube feeds at goal rate with 0cc on  residuals. Physical exam with bowel sounds, soft nontender abdomen. KUB without concern for obstruction with bowel gas, lack of transition point.    Patient now with regular bowel movements on bowel regimen.     Plan  - Miralax BID, Senna BID  - compazine PRN    Moderate malnutrition  Nutrition consulted. Most recent weight and BMI monitored-     Measurements:  Wt Readings from Last 1 Encounters:   05/08/24 122.5 kg (270 lb 1 oz)   Body mass index is 42.3 kg/m².    Patient has been screened and assessed by RD.    Malnutrition Type:  Context: acute illness or injury  Level: moderate    Malnutrition Characteristic Summary:  Weight Loss (Malnutrition): 10% in 6 months  Subcutaneous Fat (Malnutrition): mild depletion  Muscle Mass (Malnutrition): mild depletion  Fluid Accumulation (Malnutrition): mild    Interventions/Recommendations (treatment strategy):  1.    -- TF  -- going for swallow study with SLP to assess possibility of pleasure oral feedings --> failed repeatedly    Prolonged QT interval  Qtc 526, limit Qtc prolonging drugs as able      Spastic hemiplegia of right dominant side as late effect of cerebrovascular disease  Important that patient is able to sit in chair for 4h for dialysis placement needs, even if she requires lift/assistance getting into the chair     This patient has Chronic right hemiplegia due to stroke. Physical therapy services has been scheduled. Continue all standard measures for pressure injury prevention and consult wound care for any wounds (chronic or acute).    ESRD (end stage renal disease) on dialysis  Creatine stable for now. BMP reviewed- noted Estimated Creatinine Clearance: 34 mL/min (A) (based on SCr of 2.6 mg/dL (H)). according to latest data. Based on current GFR, CKD stage is end stage.  Monitor UOP and serial BMP and adjust therapy as needed. Renally dose meds. Avoid nephrotoxic medications and procedures.    -- HD MWF  -- nephro following    Status post  tracheostomy  Resolved, decannulated 4/30    Acute encephalopathy  Pt is non-verbal and does not follow commands at baseline. Vascular Neurology consulted given acute change from baseline. MRI with concern for evolution of prior strokes with noted differential of T2 hyperintensities including vasculitis vs demyelination. Discussed with Vascular Neurology; low concern for ongoing vasculitis/demyelination, likely represents typical evolution of prior infarcts.    Patient at baseline as of 4/22.     Plan  - STAT head imaging for concern of new change in mental status/focal deficit    Type 2 diabetes mellitus with hyperglycemia, with long-term current use of insulin  Patient's FSGs are controlled on current medication regimen.  Last A1c reviewed-   Lab Results   Component Value Date    HGBA1C 10.4 (H) 01/30/2024     Most recent fingerstick glucose reviewed-   Recent Labs   Lab 05/15/24  0032 05/15/24  0559 05/15/24  0846 05/15/24  1131   POCTGLUCOSE 141* 82 168* 170*       Current correctional scale  Medium  Maintain anti-hyperglycemic dose as follows-   Antihyperglycemics (From admission, onward)      Start     Stop Route Frequency Ordered    04/17/24 1200  insulin aspart U-100 pen 4 Units         -- SubQ Every 6 hours 04/17/24 0938    04/13/24 2100  insulin detemir U-100 (Levemir) pen 27 Units         -- SubQ 2 times daily 04/13/24 0931          Hold Oral hypoglycemics while patient is in the hospital.  POCT glucose q6h    Hyponatremia  Patient has hyponatremia which is uncontrolled,We will aim to correct the sodium by 4-6mEq in 24 hours. We will monitor sodium Daily. The hyponatremia is due to ESRD. Discussed with nephrology, dialysate bath adjusted to account for and correct hyponatremia.  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 Pt underwent extensive course, currently still healing from this, but no longer has  wound vac. Pt's wound currently covered with bandage. Picture in media tabs    Plan  Wound care        VTE Risk Mitigation (From admission, onward)           Ordered     heparin (porcine) injection 1,000 Units  As needed (PRN)         05/14/24 1138     heparin (porcine) injection 3,000 Units  As needed (PRN)         04/17/24 0825     heparin (porcine) injection 1,000 Units  As needed (PRN)         04/12/24 0951     heparin (porcine) injection 7,500 Units  Every 8 hours         04/11/24 0252                    Discharge Planning   ZEKE: 5/17/2024     Code Status: Full Code   Is the patient medically ready for discharge?: No    Reason for patient still in hospital (select all that apply): Patient trending condition  Discharge Plan A: New Nursing Home placement - [facility] care facility                  Osito Dos Santos MD  Department of Hospital Medicine   [facility] - Telemetry Stepdown

## 2024-05-15 NOTE — PLAN OF CARE
Problem: Infection  Goal: Absence of Infection Signs and Symptoms  Outcome: Progressing     Problem: Skin Injury Risk Increased  Goal: Skin Health and Integrity  Outcome: Progressing     Problem: Adult Inpatient Plan of Care  Goal: Plan of Care Review  Outcome: Progressing     Problem: Bariatric Environmental Safety  Goal: Safety Maintained with Care  Outcome: Progressing     Problem: Device-Related Complication Risk (Hemodialysis)  Goal: Safe, Effective Therapy Delivery  Outcome: Progressing     Problem: Hemodynamic Instability (Hemodialysis)  Goal: Effective Tissue Perfusion  Outcome: Progressing     Problem: Infection (Hemodialysis)  Goal: Absence of Infection Signs and Symptoms  Outcome: Progressing     Problem: Diabetes Comorbidity  Goal: Blood Glucose Level Within Targeted Range  Outcome: Progressing

## 2024-05-15 NOTE — ASSESSMENT & PLAN NOTE
Creatine stable for now. BMP reviewed- noted Estimated Creatinine Clearance: 34 mL/min (A) (based on SCr of 2.6 mg/dL (H)). according to latest data. Based on current GFR, CKD stage is end stage.  Monitor UOP and serial BMP and adjust therapy as needed. Renally dose meds. Avoid nephrotoxic medications and procedures.    -- HD MWF  -- nephro following

## 2024-05-15 NOTE — PROGRESS NOTES
05/15/24 0245   During Hemodialysis Assessment   Blood Flow Rate (mL/min) 400 mL/min   Dialysate Flow Rate (mL/min) 800 ml/min   Ultrafiltration Rate (mL/Hr) 0 mL/Hr   Arteriovenous Lines Secure Yes   Arterial Pressure (mmHg) -150 mmHg   Venous Pressure (mmHg) 140   Blood Volume Processed (Liters) 0 L   UF Removed (mL) 0 mL   TMP 20   Venous Line in Air Detector Yes   Intake (mL) 250 mL   Transducer Dry Yes   Access Visible Yes    notified of access issue? N/A   Heparin given? N/A   Intra-Hemodialysis Comments HD initiated     Report received from primary RN. HD started per MD order.

## 2024-05-15 NOTE — ASSESSMENT & PLAN NOTE
53 y.o. Black or  Female ESRD-HD M-W-F at [facility] presents to ED on 4/10/2024 with UTI.    Of note, the patient was initiated on RRT in February 2024 after being admitted with ALVARADO 2/2 iATN due to septic shock. Perm cath placed on 2/29/24. She was transferred to [facility] on 3/12. Deemed ESRD at [facility].  Nephrology consulted for inpatient ESRD-HD management    Assessment:   - tolerated HD this am. Next HD likely will be on Thursday unless acute need for HD arises   - Continue to monitor intake and output  - Please avoid gadolinium, fleets, phos-based laxatives, NSAIDs  - Dialysis thrice weekly unless more urgent indications arise. Will evaluate RRT requirements Daily.    Anemia of ESRD hgb 9.7 c/w EPO with HD  Recent Labs   Lab 05/10/24  0657   WBC 10.73   HGB 9.7*   HCT 31.6*          Lab Results   Component Value Date    FESATURATED 10 (L) 03/15/2024    FERRITIN 414 (H) 03/15/2024       - Goal in ESRD is Hgb of 10-11.       CKD/MBD:   - hypercalcemia resolved with HD was 10.7 down to 9 with low ca bath. Phos low but labs were drawn while patient on dialysis likely not accurate recheck RFP in am. Agree with holding sevelamer for now   - F/U PO4, Mg, Calcium. And albumin levels daily.

## 2024-05-15 NOTE — PLAN OF CARE
KARI spoke with elia from [employer], Pt's Medicaid transport representative, who stated they'll need two forms filled out prior to Pt's discharge to coordinate her AMS transport to her outside HD chair. HD chair has not been set up yet, the closest facility to the Pt's home is [facility], [phone]. Pt's family approves of this location and can accompany Pt to her HD chair. Pt is also now verbal and able to answer questions and sits up in her chair, so she IS able to sit in a reclining chair for her HD.    SW contacted [facility], they do have a sandee lift, but SW will have to call again on 5/16/24 to ask about capped off trach, which should present no barrier. They said the head of nursing is not available today, but would be in tomorrow. Pt is now on room air.     KARI will follow up with HD chair  [facility] to assist in finalizing HD chair.    ERROL Schumacher, GAYLA  [facility]  [id number]

## 2024-05-15 NOTE — PROGRESS NOTES
05/15/24 0630   Post-Hemodialysis Assessment   Rinseback Volume (mL) 200 mL   Blood Volume Processed (Liters) 78.2 L   Dialyzer Clearance Lightly streaked   Duration of Treatment 210 minutes   Additional Fluid Intake (mL) 500 mL   Total UF (mL) 1500 mL   Net Fluid Removal 1000   Patient Response to Treatment refugio well   Post-Hemodialysis Comments stable     HD completed per MD order. Report given to primary RN.

## 2024-05-15 NOTE — ASSESSMENT & PLAN NOTE
Nutrition consulted. Most recent weight and BMI monitored-     Measurements:  Wt Readings from Last 1 Encounters:   05/08/24 122.5 kg (270 lb 1 oz)   Body mass index is 42.3 kg/m².    Patient has been screened and assessed by RD.    Malnutrition Type:  Context: acute illness or injury  Level: moderate    Malnutrition Characteristic Summary:  Weight Loss (Malnutrition): 10% in 6 months  Subcutaneous Fat (Malnutrition): mild depletion  Muscle Mass (Malnutrition): mild depletion  Fluid Accumulation (Malnutrition): mild    Interventions/Recommendations (treatment strategy):  1.    -- TF  -- going for swallow study with SLP to assess possibility of pleasure oral feedings --> failed repeatedly

## 2024-05-15 NOTE — ASSESSMENT & PLAN NOTE
Patient has hyponatremia which is uncontrolled,We will aim to correct the sodium by 4-6mEq in 24 hours. We will monitor sodium Daily. The hyponatremia is due to ESRD. Discussed with nephrology, dialysate bath adjusted to account for and correct hyponatremia.  Recent Labs   Lab 05/15/24  0516   *

## 2024-05-15 NOTE — PROGRESS NOTES
[facility] - Telemetry Stepdown  Adult Nutrition  Progress Note    SUMMARY       Recommendations    Continue TF regimen of Novasource @ 40 mL/hr - meeting needs.  - If bolus TFs warranted for discharge, rec'd Novasource - 4 cans/day = 1900 kcals, 88 g of protein, 672 mL fluid.   RD to monitor & follow-up.    Goals: Meet % EEN, EPN by RD f/u date  Nutrition Goal Status: goal met  Communication of RD Recs: reviewed with RN    Assessment and Plan    Moderate malnutrition    Malnutrition Type:  Context: acute illness or injury  Level: moderate    Related to (etiology):   Inability to consume sufficient energy     Signs and Symptoms (as evidenced by):   Weight loss, NFPE, Edema    Malnutrition Characteristic Summary:  Weight Loss (Malnutrition): 10% in 6 months  Subcutaneous Fat (Malnutrition): mild depletion  Muscle Mass (Malnutrition): mild depletion  Fluid Accumulation (Malnutrition): mild    Interventions/Recommendations (treatment strategy):  Collaboration of nutrition care w/ other providers  EN    Nutrition Diagnosis Status:   Continues     Malnutrition Assessment    Malnutrition Context: acute illness or injury  Malnutrition Level: moderate    Weight Loss (Malnutrition): 10% in 6 months  Subcutaneous Fat (Malnutrition): mild depletion  Muscle Mass (Malnutrition): mild depletion  Fluid Accumulation (Malnutrition): mild     Reason for Assessment    Reason For Assessment: RD follow-up  Diagnosis: other (see comments) (Acute cystitis with hematuria)  Relevant Medical History: CVA, PEG, DM  Interdisciplinary Rounds: did not attend    General Information Comments: Remains NPO (per SLP), tolerating TFs at goal via PEG. Pt received HD overnight. Moderate malnutrition diagnosis continues; please see PES statement for details.   Nutrition Discharge Planning: Adequate nutrition    Nutrition/Diet History    Spiritual, Cultural Beliefs, Tenriism Practices, Values that Affect Care: no  Food Allergies: NKFA  Factors  "Affecting Nutritional Intake: NPO    Anthropometrics    Temp: 99 °F (37.2 °C)  Height Method: Stated  Height: 5' 7" (170.2 cm)  Height (inches): 67 in  Weight Method: Bed Scale  Weight: 122.5 kg (270 lb 1 oz)  Weight (lb): 270.07 lb  Ideal Body Weight (IBW), Female: 135 lb  % Ideal Body Weight, Female (lb): 200.05 %  BMI (Calculated): 42.3  BMI Grade: greater than 40 - morbid obesity  Usual Body Weight (UBW), kg: (!) 136.4 kg  % Usual Body Weight: 90  % Weight Change From Usual Weight: -10.19 %    Lab/Procedures/Meds    Pertinent Labs Reviewed: reviewed  Pertinent Labs Comments: Creat 2.6, GFR 21.4  Pertinent Medications Reviewed: reviewed  Pertinent Medications Comments: Psllium husk    Estimated/Assessed Needs    Weight Used For Calorie Calculations: 122.5 kg (270 lb 1 oz)    Energy Calorie Requirements (kcal): 2048 kcal/d  Energy Need Method: Ferry-St Jeor (1.1 PAL)    Protein Requirements:  g/d (.8-1 g/kg)  Weight Used For Protein Calculations: 122.5 kg (270 lb 1 oz)    Estimated Fluid Requirement Method: other (see comments) (Per MD)  RDA Method (mL): 2048    CHO Requirement: 256g    Nutrition Prescription Ordered    Current Diet Order: NPO  Current Nutrition Support Formula Ordered: Novasource Renal  Current Nutrition Support Rate Ordered: 40 mL/hr    Evaluation of Received Nutrient/Fluid Intake    Enteral Calories (kcal): 1920  Enteral Protein (gm): 87  Enteral (Free Water) Fluid (mL): 688  Free Water Flush Fluid (mL): 400    % Kcal Needs: 94%  % Protein Needs: 90%    I/O: +1.6L since 5/1    Energy Calories Required: meeting needs  Protein Required: meeting needs  Fluid Required: other (see comments) (Per MD)    Comments: LBM: 5/14    Tolerance: tolerating    Nutrition Risk    Level of Risk/Frequency of Follow-up:  (1x/week)     Monitor and Evaluation    Food and Nutrient Intake: enteral nutrition intake  Food and Nutrient Adminstration: enteral and parenteral nutrition administration  Physical " Activity and Function: nutrition-related ADLs and IADLs  Anthropometric Measurements: weight, weight change  Biochemical Data, Medical Tests and Procedures: glucose/endocrine profile, lipid profile, inflammatory profile, gastrointestinal profile  Nutrition-Focused Physical Findings: overall appearance     Nutrition Follow-Up    RD Follow-up?: Yes

## 2024-05-15 NOTE — ASSESSMENT & PLAN NOTE
Patient's FSGs are controlled on current medication regimen.  Last A1c reviewed-   Lab Results   Component Value Date    HGBA1C 10.4 (H) 01/30/2024     Most recent fingerstick glucose reviewed-   Recent Labs   Lab 05/15/24  0032 05/15/24  0559 05/15/24  0846 05/15/24  1131   POCTGLUCOSE 141* 82 168* 170*       Current correctional scale  Medium  Maintain anti-hyperglycemic dose as follows-   Antihyperglycemics (From admission, onward)    Start     Stop Route Frequency Ordered    04/17/24 1200  insulin aspart U-100 pen 4 Units         -- SubQ Every 6 hours 04/17/24 0938    04/13/24 2100  insulin detemir U-100 (Levemir) pen 27 Units         -- SubQ 2 times daily 04/13/24 0931        Hold Oral hypoglycemics while patient is in the hospital.  POCT glucose q6h

## 2024-05-15 NOTE — PLAN OF CARE
Patient stable, AOX1 , VSS. NPO. Tube feeding 40ml/hr continued. Safety measures ensured.call light within reach.bed in low position. No distress at night.

## 2024-05-15 NOTE — PT/OT/SLP PROGRESS
Physical Therapy Treatment    Patient Name:  Sarah Saravia   MRN:  [id number]    Recommendations:     Discharge Recommendations: Moderate Intensity Therapy  Discharge Equipment Recommendations: hospital bed, lift device, wheelchair  Barriers to discharge: Evolving clinical presentation    Patient has a mobility limitation that significantly impairs her ability to participate in one or more mobility related activities of daily living (MRADL's) such as toileting, feeding, dressing, grooming, and bathing in customary locations in the home. The mobility limitation cannot be sufficiently resolved by the use of a cane or walker. The use of a manual wheelchair will significantly improve the patient's ability to participate in MRADLS and the patient will use it on regular basis in the home. Patient has expressed her willingness to use a manual wheelchair in the home. She also has a caregiver who is     Patient requires a hospital bed due to her requiring positioning of the body in ways not feasible with an ordinary bed to alleviate pain/ is completely immobile /or limited mobility and cannot independently make changes in body position without the use of the bed. The positioning of the body cannot be sufficiently resolved by the use of pillows and wedges.       Assessment:     Sarah Saravia is a 53 y.o. female admitted with a medical diagnosis of Acute cystitis with hematuria.  She presents with the following impairments/functional limitations: weakness, impaired endurance, impaired self care skills, impaired functional mobility, gait instability, impaired balance, impaired cognition, decreased coordination, decreased upper extremity function, decreased lower extremity function, decreased safety awareness, pain, decreased ROM, impaired coordination, impaired skin, edema requiring total assistance and verbal cues for bed mob, scooting to EOB/HOB, sit < > stand transitions, OOB transfers to prevent falls due to weakness,  cognitive deficits, pain.   In light of pt's current functional level and deficits, it is anticipated that pt will need to participate in a moderate intensity rehab program consisting of PT and OT in order to achieve full rehab potential to return to previous level of function and roles.  Pt remains motivated to participate in PT session and will cont to benefit from skilled PT intervention..    Rehab Prognosis: Fair and Poor; patient would benefit from acute skilled PT services to address these deficits and reach maximum level of function.    Recent Surgery: * No surgery found *      Plan:     During this hospitalization, patient to be seen 3 x/week to address the identified rehab impairments via therapeutic activities, therapeutic exercises, neuromuscular re-education, wheelchair management/training and progress toward the following goals:    Plan of Care Expires:  05/22/24    Subjective     Chief Complaint: pain  Pain/Comfort:  Pain Rating 1:  (Pt unable to rate)  Location - Side 1: Bilateral  Location - Orientation 1: generalized  Location 1: knee  Pain Addressed 1: Reposition, Distraction, Cessation of Activity  Pain Rating Post-Intervention 1:  (Pt unable to rate)      Objective:     Communicated with nurse (Justa) prior to session.  Patient found  sup with HOB elevated  with PEG Tube, pressure relief boots (fall monitor camera) upon PT entry to room.     General Precautions: Standard, aphasia, aspiration, fall, NPO, contact (ESBL/MDRO in urine)  Orthopedic Precautions: N/A  Braces: N/A  Respiratory Status: Room air     Functional Mobility:  Bed Mobility:     Rolling Left:  total A of 2, HOB flat, via drawsheet  Rolling Right: total A of 2, HOB flat, via drawsheet  Scooting: anteriorly to the EOB with total A with use of drawsheet  Supine to Sit: total A of 2 for trunk elevation and LE's, exiting on the R side, HOB at 30* angle, with use of drawsheet  Sit to Supine: total A of 2 for trunk and LE's, HOB  flat  Transfers:  Sit to Stand:  from EOB with max/total A of 2  with no AD/B HHA x2 attempts.  Pt able to achieve partial stand on 1st trial only  Balance: static sitting at the EOB with B UE support with SBA; Dynamic sitting at the EOB (target reaching with UE's with single UE support mod/min A).  Total sitting at  the EOB x18 min  [facility] this [facility] due to family not present at bedside      AM-PAC 6 CLICK MOBILITY  Turning over in bed (including adjusting bedclothes, sheets and blankets)?: 2  Sitting down on and standing up from a chair with arms (e.g., wheelchair, bedside commode, etc.): 1  Moving from lying on back to sitting on the side of the bed?: 2  Moving to and from a bed to a chair (including a wheelchair)?: 1  Need to walk in hospital room?: 1  Climbing 3-5 steps with a railing?: 1  Basic Mobility Total Score: 8       Treatment & Education:  Patient provided with daily orientation and goals of this PT session. They were educated to call for assistance and to transfer with hospital staff only.  Also, pt was educated on the effects of prolonged immobility and the importance of performing OOB activity and exercises to promote healing and reduce recovery time    Patient left  R side lying with HOB at 35* angle  with all lines intact, call button in reach, and nurse notified..    GOALS:   Multidisciplinary Problems       Physical Therapy Goals          Problem: Physical Therapy    Goal Priority Disciplines Outcome Goal Variances Interventions   Physical Therapy Goal     PT, PT/OT Progressing     Description: Goals to be met by: 24   Goals remain appropriate 5/3/2024 to be met by 24  Goals updated 2024 to be met by 24    Patient will increase functional independence with mobility by performin. Supine to sit with Maximum Assistance  2. Sit to supine with Maximum Assistance  3. Rolling to Left and Right with Moderate Assistance.  4. Sitting at edge of bed 5  minutes with Moderate Assistance- MET 04/30, monitor consistency  5. Lower extremity exercise program x20 reps per handout, with assistance as needed                         Time Tracking:     PT Received On: 05/15/24  PT Start Time: 1005     PT Stop Time: 1041  PT Total Time (min): 36 min     Billable Minutes: Therapeutic Activity 36    Treatment Type: Treatment  PT/PTA: PTA     Number of PTA visits since last PT visit: 1     05/15/2024

## 2024-05-16 PROBLEM — R53.81 DEBILITY: Status: ACTIVE | Noted: 2024-05-16

## 2024-05-16 LAB
HBV CORE AB SERPL QL IA: NORMAL
HBV SURFACE AB SER-ACNC: <3 MIU/ML
HBV SURFACE AB SER-ACNC: NORMAL M[IU]/ML
POCT GLUCOSE: 145 MG/DL (ref 70–110)
POCT GLUCOSE: 153 MG/DL (ref 70–110)
POCT GLUCOSE: 154 MG/DL (ref 70–110)
POCT GLUCOSE: 165 MG/DL (ref 70–110)
POCT GLUCOSE: 166 MG/DL (ref 70–110)

## 2024-05-16 PROCEDURE — 97535 SELF CARE MNGMENT TRAINING: CPT

## 2024-05-16 PROCEDURE — 25000003 PHARM REV CODE 250

## 2024-05-16 PROCEDURE — 63600175 PHARM REV CODE 636 W HCPCS

## 2024-05-16 PROCEDURE — 27000207 HC ISOLATION

## 2024-05-16 PROCEDURE — 30200315 PPD INTRADERMAL TEST REV CODE 302

## 2024-05-16 PROCEDURE — 86706 HEP B SURFACE ANTIBODY: CPT

## 2024-05-16 PROCEDURE — 20600001 HC STEP DOWN PRIVATE ROOM

## 2024-05-16 PROCEDURE — 36415 COLL VENOUS BLD VENIPUNCTURE: CPT

## 2024-05-16 PROCEDURE — 92526 ORAL FUNCTION THERAPY: CPT

## 2024-05-16 PROCEDURE — 25000242 PHARM REV CODE 250 ALT 637 W/ HCPCS

## 2024-05-16 PROCEDURE — 86580 TB INTRADERMAL TEST: CPT

## 2024-05-16 PROCEDURE — 86704 HEP B CORE ANTIBODY TOTAL: CPT

## 2024-05-16 RX ORDER — ATORVASTATIN CALCIUM 40 MG/1
40 TABLET, FILM COATED ORAL DAILY
Qty: 90 TABLET | Refills: 3 | Status: SHIPPED | OUTPATIENT
Start: 2024-05-16 | End: 2025-05-16

## 2024-05-16 RX ORDER — SODIUM CHLORIDE 9 MG/ML
INJECTION, SOLUTION INTRAVENOUS ONCE
Status: COMPLETED | OUTPATIENT
Start: 2024-05-17 | End: 2024-05-17

## 2024-05-16 RX ORDER — METOPROLOL TARTRATE 25 MG/1
25 TABLET, FILM COATED ORAL 2 TIMES DAILY
Qty: 180 TABLET | Refills: 3 | Status: SHIPPED | OUTPATIENT
Start: 2024-05-16 | End: 2025-05-16

## 2024-05-16 RX ADMIN — PSYLLIUM HUSK 1 PACKET: 3.4 POWDER ORAL at 09:05

## 2024-05-16 RX ADMIN — ATORVASTATIN CALCIUM 40 MG: 40 TABLET, FILM COATED ORAL at 09:05

## 2024-05-16 RX ADMIN — INSULIN ASPART 4 UNITS: 100 INJECTION, SOLUTION INTRAVENOUS; SUBCUTANEOUS at 12:05

## 2024-05-16 RX ADMIN — ASPIRIN 81 MG CHEWABLE TABLET 81 MG: 81 TABLET CHEWABLE at 09:05

## 2024-05-16 RX ADMIN — Medication 500 MG: at 09:05

## 2024-05-16 RX ADMIN — INSULIN DETEMIR 27 UNITS: 100 INJECTION, SOLUTION SUBCUTANEOUS at 09:05

## 2024-05-16 RX ADMIN — HEPARIN SODIUM 7500 UNITS: 5000 INJECTION INTRAVENOUS; SUBCUTANEOUS at 09:05

## 2024-05-16 RX ADMIN — METOPROLOL TARTRATE 25 MG: 25 TABLET, FILM COATED ORAL at 09:05

## 2024-05-16 RX ADMIN — HEPARIN SODIUM 7500 UNITS: 5000 INJECTION INTRAVENOUS; SUBCUTANEOUS at 03:05

## 2024-05-16 RX ADMIN — ZINC SULFATE 220 MG (50 MG) CAPSULE 220 MG: CAPSULE at 09:05

## 2024-05-16 RX ADMIN — INSULIN ASPART 4 UNITS: 100 INJECTION, SOLUTION INTRAVENOUS; SUBCUTANEOUS at 05:05

## 2024-05-16 RX ADMIN — TUBERCULIN PURIFIED PROTEIN DERIVATIVE 5 UNITS: 5 INJECTION, SOLUTION INTRADERMAL at 12:05

## 2024-05-16 RX ADMIN — PANTOPRAZOLE SODIUM 40 MG: 40 GRANULE, DELAYED RELEASE ORAL at 09:05

## 2024-05-16 NOTE — SUBJECTIVE & OBJECTIVE
Interval History: NAEON pending placement, DME      Objective:     Vital Signs (Most Recent):  Temp: 98.9 °F (37.2 °C) (05/16/24 0721)  Pulse: 103 (05/16/24 0721)  Resp: 18 (05/16/24 0721)  BP: 125/77 (05/16/24 0721)  SpO2: 97 % (05/16/24 0721) Vital Signs (24h Range):  Temp:  [98.6 °F (37 °C)-99.6 °F (37.6 °C)] 98.9 °F (37.2 °C)  Pulse:  [] 103  Resp:  [17-18] 18  SpO2:  [97 %-100 %] 97 %  BP: ()/(65-91) 125/77     Weight: 122.5 kg (270 lb 1 oz)  Body mass index is 42.3 kg/m².    Intake/Output Summary (Last 24 hours) at 5/16/2024 0924  Last data filed at 5/16/2024 0909  Gross per 24 hour   Intake 2779 ml   Output --   Net 2779 ml         Physical Exam  Vitals and nursing note reviewed.   HENT:      Head: Normocephalic and atraumatic.   Neck:      Comments: Trach removed  Cardiovascular:      Rate and Rhythm: Normal rate and regular rhythm.      Heart sounds: Normal heart sounds.   Pulmonary:      Effort: Pulmonary effort is normal. No respiratory distress.      Breath sounds: Normal breath sounds.   Abdominal:      General: Abdomen is flat. There is no distension.      Palpations: Abdomen is soft.      Tenderness: There is no abdominal tenderness.      Comments: PEG tube   Musculoskeletal:      Right lower leg: No edema.      Left lower leg: No edema.      Comments: Moves UEs spontaneously   Skin:     General: Skin is warm and dry.   Neurological:      General: No focal deficit present.      Mental Status: She is alert.      Comments: Nonverbal, not following commands             Significant Labs: All pertinent labs within the past 24 hours have been reviewed.    Significant Imaging: I have reviewed all pertinent imaging results/findings within the past 24 hours.

## 2024-05-16 NOTE — ASSESSMENT & PLAN NOTE
Needs:  Wheelchair: patient has a mobility limitation that significantly impairs her ability to participate in one or more mobility related activities of daily living (MRADL's) such as toileting, feeding, dressing, grooming, and bathing in customary locations in the home.  The mobility limitation cannot be sufficiently resolved by the use of a cane or walker.   The use of a manual wheelchair will significantly improve the patient's ability to participate in MRADLS and the patient will use it on regular basis in the home.  Family/pt has expressed her willingness to use a manual wheelchair in the home.  She also has a caregiver who is capable of assisting in mobility.    Mrs Saravia requires a hospital bed due to her requiring positioning of the body in ways not feasible with an ordinary bed to alleviate pain/ is completely immobile /or limited mobility and cannot independently make changes in body position without the use of the bed.  The positioning of the body cannot be sufficiently resolved by the use of pillows and wedges    Patient has a mobility limitation that significantly impairs their ability to participate in one or more mobility related activities of daily living, including toileting. This deficit can be resolved by using a bedside commode. Patient demonstrates mobility limitations that will cause them to be confined to one room at home without bathroom access for up to 30 days. Using a bedside commode will greatly improve the patient's ability to participate in MRADLs

## 2024-05-16 NOTE — PROGRESS NOTES
"KARI received notification by CHRISTINA Schumacher, pt in need of outpt dialysis set-up at [facility]. SW requesting updated hep b labs and CXR as pt's current labs are >30 days old.    SW to upload pt's outpt dialysis referral to [facility] online portal when required labs result.    SW to follow with updates.    UPDATE 12:04PM: Referral successfully uploaded to [facility] online portal requesting placement at [facility]. Pt pending medical and financial approval. SW to follow with updates.      UPDATE 2:09PM: KARI received call from [facility] central intake pt coordinator Linda. Per Linda, [facility] "unlikely" to accept pt with trach and PEG tube. KARI informed Linda pt's trach is capped and PEG tube will not need to be cared for by HD staff while at dialysis. KARI informed pt's needs while at dialysis are within the dialysis unit's scope of practice. KARI also informed CHRISTINA Schumacher had spoken with [facility] regarding pt's capped trach yesterday. KARI informed I had attempted to contact [facility] today to provide additional clarification but was unable to get in contact with [facility]. Linda stated she will relay this information to [facility].    UPDATE 2:37PM: KARI attempted to contact [facility] JULIAN Car. Informed Joceline is not in today. KARI will follow up tomorrow AM regarding pt's referral status.      Dafne Mccord, GAYLA  [facility]  [phone]        "

## 2024-05-16 NOTE — PLAN OF CARE
[facility] - Telemetry Stepdown      HOME HEALTH ORDERS  FACE TO FACE ENCOUNTER    Patient Name: Sarah Saravia  YOB: 1970    PCP: Deanna, Primary Doctor   PCP Address: None  PCP Phone Number: None  PCP Fax: None    Encounter Date: 4/10/24    Admit to Home Health    Diagnoses:  Active Hospital Problems    Diagnosis  POA    *Acute cystitis with hematuria [N30.01]  Yes    Complication of vascular dialysis catheter [T82.9XXA]  No    Moderate malnutrition [E44.0]  Yes    Constipation [K59.00]  No    Prolonged QT interval [R94.31]  Yes    ESRD (end stage renal disease) on dialysis [N18.6, Z99.2]  Not Applicable    Spastic hemiplegia of right dominant side as late effect of cerebrovascular disease [I69.951]  Not Applicable    Status post tracheostomy [Z93.0]  Not Applicable    Acute encephalopathy [G93.40]  Yes    Type 2 diabetes mellitus with hyperglycemia, with long-term current use of insulin [E11.65, Z79.4]  Not Applicable    Hyponatremia [E87.1]  Yes     POA, Na      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    Allergies:Review of patient's allergies indicates:  No Known Allergies    Medications: Review discharge medications with patient and family and provide education.    Current Facility-Administered Medications   Medication Dose Route Frequency Provider Last Rate Last Admin    0.9%  NaCl infusion   Intravenous Once Zaid Carlos MD        acetaminophen oral solution 650 mg  650 mg Per G Tube Q6H PRN Art Glynn DO   650 mg at 05/14/24 1846    ascorbic acid (vitamin C) tablet 500 mg  500 mg Per G Tube BID Kristopher Tyler DO   500 mg at 05/16/24 0908    aspirin chewable tablet 81 mg  81 mg Per G Tube Daily Adi Aguirre MD   81 mg at 05/16/24 0907    atorvastatin tablet 40 mg  40 mg Per G Tube Daily Osito Dos Santos MD    40 mg at 05/16/24 0908    dextrose 10 % infusion   Intravenous Continuous PRN Adi Aguirre MD        dextrose 10% bolus 125 mL 125 mL  12.5 g Intravenous PRN Kristopher Tyler DO        dextrose 10% bolus 250 mL 250 mL  25 g Intravenous PRN Kristopher Tyler DO        diphenhydrAMINE-zinc acetate 2-0.1% cream   Topical (Top) TID PRN Jessie Oh MD   Given at 05/08/24 0620    epoetin merry injection 6,100 Units  50 Units/kg Intravenous Every Mon, Wed, Fri Shwetha Gregory DNP, FNP-C   6,100 Units at 05/15/24 1133    glucagon (human recombinant) injection 1 mg  1 mg Intramuscular PRN Kristopher Tyler DO        glucose chewable tablet 16 g  16 g Oral PRN Kristopher Tyler DO        glucose chewable tablet 24 g  24 g Oral PRN Kristopher Tyler DO        heparin (porcine) injection 1,000 Units  1,000 Units Intra-Catheter PRN Shwetha Gregory DNP, FNP-C   1,000 Units at 05/10/24 1040    heparin (porcine) injection 1,000 Units  1,000 Units Intra-Catheter PRN Zaid Carlos MD        heparin (porcine) injection 3,000 Units  3,000 Units Intravenous PRN Delfina Shi DNP   3,000 Units at 05/15/24 0335    heparin (porcine) injection 7,500 Units  7,500 Units Subcutaneous Q8H Kristopher Tyler DO   7,500 Units at 05/15/24 2109    insulin aspart U-100 pen 4 Units  4 Units Subcutaneous Q6H Hola Liriano MD   4 Units at 05/16/24 0035    insulin detemir U-100 (Levemir) pen 27 Units  27 Units Subcutaneous BID Adi Aguirre MD   27 Units at 05/16/24 0909    metoprolol tartrate (LOPRESSOR) tablet 25 mg  25 mg Per G Tube BID Osito Dos Santos MD   25 mg at 05/16/24 0908    naloxone 0.4 mg/mL injection 0.02 mg  0.02 mg Intravenous PRN Kristopher Tyler DO        ondansetron injection 4 mg  4 mg Intravenous Q6H PRN Hola Liriano MD   4 mg at 05/10/24 1230    pantoprazole suspension 40 mg  40 mg Per G Tube Daily Kristopher Tyler DO   40 mg at 05/16/24 0907    psyllium husk (aspartame) 3.4 gram packet 1 packet  1 packet Per G Tube Daily  Kristopher Tyler DO   1 packet at 05/16/24 0907    sodium chloride 0.9% flush 10 mL  10 mL Intravenous Q12H PRN Kristopher Tyler DO        zinc sulfate capsule 220 mg  220 mg Per G Tube Daily Kristopher Tyler DO   220 mg at 05/16/24 0908     Current Discharge Medication List        START taking these medications    Details   aspirin 81 MG Chew 1 tablet (81 mg total) by Per G Tube route once daily.    Associated Diagnoses: Sepsis, due to unspecified organism, unspecified whether acute organ dysfunction present      atorvastatin (LIPITOR) 40 MG tablet 1 tablet (40 mg total) by Per G Tube route once daily.  Qty: 90 tablet, Refills: 3      diphenhydrAMINE-zinc acetate 2-0.1% (BENADRYL) cream Apply topically 3 (three) times daily as needed for Itching.  Qty: 15 g, Refills: 0      insulin aspart U-100 (NOVOLOG) 100 unit/mL (3 mL) InPn pen Inject 4 Units into the skin every 6 (six) hours.    Associated Diagnoses: Sepsis, due to unspecified organism, unspecified whether acute organ dysfunction present      insulin detemir U-100, Levemir, 100 unit/mL (3 mL) SubQ InPn pen Inject 27 Units into the skin 2 (two) times daily.    Associated Diagnoses: Sepsis, due to unspecified organism, unspecified whether acute organ dysfunction present      metoprolol tartrate (LOPRESSOR) 25 MG tablet 1 tablet (25 mg total) by Per G Tube route 2 (two) times daily.  Qty: 180 tablet, Refills: 3    Comments: .      polyethylene glycol (GLYCOLAX) 17 gram PwPk 17 g by Per G Tube route once daily.    Associated Diagnoses: Sepsis, due to unspecified organism, unspecified whether acute organ dysfunction present           CONTINUE these medications which have CHANGED    Details   amLODIPine (NORVASC) 10 MG tablet 1 tablet (10 mg total) by Per G Tube route once daily. Hold until follow up with primary care physician    Comments: .  Associated Diagnoses: Sepsis, due to unspecified organism, unspecified whether acute organ dysfunction present      sevelamer  carbonate (RENVELA) 0.8 gram PwPk 1 packet (0.8 g total) by Per G Tube route 3 (three) times daily.    Associated Diagnoses: Sepsis, due to unspecified organism, unspecified whether acute organ dysfunction present           CONTINUE these medications which have NOT CHANGED    Details   ascorbic acid, vitamin C, (VITAMIN C) 500 MG tablet 500 mg by Per G Tube route 2 (two) times daily.      chlorhexidine (PERIDEX) 0.12 % solution Use as directed 15 mLs in the mouth or throat 2 (two) times daily.      pantoprazole sodium (PANTOPRAZOLE ORAL) 40 mg once daily. Liquid via gtube      psyllium (KONSYL) Powd 1 packet by Per G Tube route once daily.      zinc sulfate (ZINCATE) 50 mg zinc (220 mg) capsule 220 mg by Per G Tube route once daily.           STOP taking these medications       carvediloL (COREG) 25 MG tablet Comments:   Reason for Stopping:         heparin sodium,porcine (HEPARIN, PORCINE,) 5,000 unit/mL injection Comments:   Reason for Stopping:         insulin aspart U-100 (NOVOLOG) 100 unit/mL injection Comments:   Reason for Stopping:         insulin detemir U-100 (LEVEMIR) 100 unit/mL injection Comments:   Reason for Stopping:                 I have seen and examined this patient within the last 30 days. My clinical findings that support the need for the home health skilled services and home bound status are the following:no   Weakness/numbness causing balance and gait disturbance due to Stroke making it taxing to leave home.     Diet:   other    Continue TF regimen of Novasource @ 40 mL/hr - meeting needs.  - If bolus TFs warranted for discharge, rec'd Novasource - 4 cans/day = 1900 kcals, 88 g of protein, 672 mL fluid.   Labs:  Report Lab results to PCP.    Referrals/ Consults  Physical Therapy to evaluate and treat. Evaluate for home safety and equipment needs; Establish/upgrade home exercise program. Perform / instruct on therapeutic exercises, gait training, transfer training, and Range of  Motion.  Occupational Therapy to evaluate and treat. Evaluate home environment for safety and equipment needs. Perform/Instruct on transfers, ADL training, ROM, and therapeutic exercises.  Speech Therapy  to evaluate and treat for  Swallowing and Cognition.   to evaluate for community resources/long-range planning.  Aide to provide assistance with personal care, ADLs, and vital signs.    Activities:   activity as tolerated    Nursing:   Agency to admit patient within 24 hours of hospital discharge unless specified on physician order or at patient request    SN to complete comprehensive assessment including routine vital signs. Instruct on disease process and s/s of complications to report to MD. Review/verify medication list sent home with the patient at time of discharge  and instruct patient/caregiver as needed. Frequency may be adjusted depending on start of care date.     Skilled nurse to perform up to 3 visits PRN for symptoms related to diagnosis    Notify MD if SBP > 160 or < 90; DBP > 90 or < 50; HR > 120 or < 50; Temp > 101; O2 < 88%; Other:       Ok to schedule additional visits based on staff availability and patient request on consecutive days within the home health episode.    When multiple disciplines ordered:    Start of Care occurs on Sunday - Wednesday schedule remaining discipline evaluations as ordered on separate consecutive days following the start of care.    Thursday SOC -schedule subsequent evaluations Friday and Monday the following week.     Friday - Saturday SOC - schedule subsequent discipline evaluations on consecutive days starting Monday of the following week.    For all post-discharge communication and subsequent orders please contact patient's primary care physician. If unable to reach primary care physician or do not receive response within 30 minutes, please contact PCP office for clinical staff order clarification    Miscellaneous   PEG Care:  Instruct  patient/caregiver to clean site.  Monitor skin integrity.  Routine Skin for Bedridden Patients: Instruct patient/caregiver to apply moisture barrier cream to all skin folds and wet areas in perineal area daily and after baths and all bowel movements.  Home Infusion Therapy:   SN to perform Infusion Therapy/Central Line Care.  Review Central Line Care & Central Line Flush with patient.    Administer (drug and dose): see diet for TF      Scrub the Hub: Prior to accessing the line, always perform a 30 second alcohol scrub  Each lumen of the central line is to be flushed at least daily with 10 mL Normal Saline and 3 mL Heparin flush (10 units/mL)  Skilled Nurse (SN) may draw blood from IV access  Blood Draw Procedure:   - Aspirate at least 5 mL of blood   - Discard   - Obtain specimen   - Change injection cap   - Flush with 20 mL Normal Saline followed by a                 3-5 mL Heparin flush (10 units/mL)  Central :   - Sterile dressing changes are done weekly and as needed.   - Use chlor-hexadine scrub to cleanse site, apply Biopatch to insertion site,       apply securement device dressing   - Injection caps are changed weekly and after EVERY lab draw.   - If sterile gauze is under dressing to control oozing,                 dressing change must be performed every 24 hours until gauze is not needed.    Home Health Aide:  Nursing Three times weekly, Physical Therapy Three times weekly, Occupational Therapy Three times weekly, Speech Language Pathology Three times weekly, Medical Social Work Weekly, and Home Health Aide Three times weekly    Wound Care Orders  no    I certify that this patient is confined to her home and needs intermittent skilled nursing care, physical therapy, speech therapy, and occupational therapy.

## 2024-05-16 NOTE — PLAN OF CARE
SW in touch with the Pt's family and insurance. Home Health, Infusion, DME being arranged. Referrals sent to [facility] for enteral feedings.    ERROL Schumacher LMSW  [facility]  [phone]

## 2024-05-16 NOTE — ASSESSMENT & PLAN NOTE
Patient's FSGs are controlled on current medication regimen.  Last A1c reviewed-   Lab Results   Component Value Date    HGBA1C 10.4 (H) 01/30/2024     Most recent fingerstick glucose reviewed-   Recent Labs   Lab 05/15/24  1653 05/15/24  2107 05/16/24  0033 05/16/24  0812   POCTGLUCOSE 153* 157* 165* 145*       Current correctional scale  Medium  Maintain anti-hyperglycemic dose as follows-   Antihyperglycemics (From admission, onward)    Start     Stop Route Frequency Ordered    04/17/24 1200  insulin aspart U-100 pen 4 Units         -- SubQ Every 6 hours 04/17/24 0938    04/13/24 2100  insulin detemir U-100 (Levemir) pen 27 Units         -- SubQ 2 times daily 04/13/24 0931        Hold Oral hypoglycemics while patient is in the hospital.  POCT glucose q6h

## 2024-05-16 NOTE — NURSING
Pt. Refused AM meds. She refused to allow her CBG to be checked and to get her AM scheduled insulin. Also refused to allow nurse to view PEG site and to prep new bag/tubing to administer ordered cont. tube feeding.

## 2024-05-16 NOTE — PT/OT/SLP PROGRESS
"Speech Language Pathology Treatment    Patient Name:  Sarah Saravia   MRN:  [id number]  Admitting Diagnosis: Acute cystitis with hematuria    Recommendations:                 General Recommendations:  Dysphagia therapy, Speech/language therapy  Diet recommendations:  NPO, Liquid Diet Level: NPO Pt may receive small sips of water for pleasure/comfort.  Hyolaryngeal palpation encouraged to ensure swallow production.   Aspiration Precautions: Continue alternate means of nutrition, Frequent oral care, HOB to 90 degrees, Monitor for s/s of aspiration, and Strict aspiration precautions   General Precautions: Standard, aphasia, aspiration, fall, NPO  Communication strategies:  go to room if call light pushed    Assessment:     Sarah Saravia is a 53 y.o. female with an SLP diagnosis of Aphasia, Dysphagia, and Cognitive-Linguistic Impairment.     Subjective     "Mm-hmm" pt vocalized an affirmative response occasionally, but did not vocalize or verbalize on command.     Pain/Comfort:  Pain Rating 1: 0/10    Respiratory Status: Room air    Objective:     Has the patient been evaluated by SLP for swallowing?   Yes  Keep patient NPO? Yes   Current Respiratory Status:        Pt seen for ongoing swallowing assessment with daughter present. Pt awake/alert and sitting upright in bed. Pt accepted PO trials of thin water via tsp x 1, cup sip x 1, straw sips x 7, pudding via 1/2 tsp x 2 and full tsp x 2.  Pt with timely initiation of oral transit and pharyngeal swallow for water.  Pt exhibiting prolonged holding for pudding trials  (15 seconds for 1st trial, over 1 minute for remaining trials). Pt did not respond to verbal cues to swallow, but did swallow when given straw sip of water. Given model and tactile cues, pt did open mouth to allow SLP to ensure oral cavity was cleared of pureed boluses.  No overt s/s of aspiration observed with trials received.  SLP recommending that pt receive small sips of water for pleasure/comfort at " this time, but no other textures appear safe to consume outside of therapeutic trials with SLP due to prolonged holding.  Education was provided to pt and daughter regarding role of SLP, ongoing swallowing assessment, recommendations for thin water for pleasure only, attention impairments contributing to prolonged holding of pureed boluses, risks of aspiration, plans of for continued SLP services upon discharge, and SLP treatment plan and POC. Pt's daughter expressed understanding, but pt was unable to do so.     Goals:   Multidisciplinary Problems       SLP Goals          Problem: SLP    Goal Priority Disciplines Outcome   SLP Goal     SLP    Description: Speech Language Pathology Goals  Updated goals expected to be met by 5/10 (goals remain appropriate - reassess on 5/17):  1. Pt will participate in ongoing swallowing assessment to determine if appropriate for PO trials for pleasure.   2. Pt will model single step command x 1 given max cues.   3. Pt will answer simple yes/no q's during a structured receptive language task x 1 given max cues.   4. Pt will phonate purposefully upon command x 1 given max cues.   5. Pt will attempt to vocalize/verbalize during automatic speech task x 1 given max cues.   6. Pt will participate in evaluation of ability to utilize basic communication board.     Goals expected to be met by 5/8:  1. Pt will participate in Modified Barium Swallow Study to determine if safe for oral intake. Goal met/attempted 5/2                               Plan:     Patient to be seen:  3 x/week   Plan of Care expires:  05/31/24  Plan of Care reviewed with:  patient, daughter   SLP Follow-Up:  Yes       Discharge recommendations:   (continue SLP services upon d/c)     Time Tracking:     SLP Treatment Date:   05/16/24  Speech Start Time:  1213  Speech Stop Time:  1235     Speech Total Time (min):  22 min    Billable Minutes: Treatment Swallowing Dysfunction 12 and Self Care/Home Management Training  10    05/16/2024

## 2024-05-16 NOTE — ASSESSMENT & PLAN NOTE
Nutrition consulted. Most recent weight and BMI monitored-     Measurements:  Wt Readings from Last 1 Encounters:   05/15/24 122.5 kg (270 lb 1 oz)   Body mass index is 42.3 kg/m².    Patient has been screened and assessed by RD.    Malnutrition Type:  Context: acute illness or injury  Level: moderate    Malnutrition Characteristic Summary:  Weight Loss (Malnutrition): 10% in 6 months  Subcutaneous Fat (Malnutrition): mild depletion  Muscle Mass (Malnutrition): mild depletion  Fluid Accumulation (Malnutrition): mild    Interventions/Recommendations (treatment strategy):  1.    -- TF  -- going for swallow study with SLP to assess possibility of pleasure oral feedings --> failed repeatedly

## 2024-05-16 NOTE — PLAN OF CARE
Pt's [employer] insurance requested a Home Health referral to be sent to [facility]. Referral sent.     ERROL Schumacher LMSW [facility]  [id number]

## 2024-05-16 NOTE — PROGRESS NOTES
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 History was given from patient's daughter. She was undergoing dialysis today and noticed she was lethargic, less alert than usual self. Pt completed dialysis and still not acting herself. EMS was called, fever of a 100.  On chart review, she did have an episode of large volume emesis around 1700.  Per EMS, she had a slightly elevated temp 100.0°F, glucose 300s.     In the ED: UA 2+ leuks, >100 WBC, many bacteria, WBC 17, CT abd/pelvis concerning for cystitis. Given vanc/zosyn    Overview/Hospital Course:  Patient was admitted to Hospital Medicine service for medical management and evaluation of urosepsis. Patient was continued on vanc/zosyn. Vascular Neurology consulted concerning mental status change with the recommendation to initiate ASA 81 QD and to obtain an MRI to r/o new stroke. Imaging pending. Nephrology was consulted for regularly scheduled dialysis. Afternoon of 4/12, patient was unable to tolerate filtration of volume during dialsis 2/2 hypotension. Patient received 500cc bolus and was transferred back to floor after finishing the session without volume removed. Will monitor for signs of volume overload. Tailoring insulin regimen while uptitrating tube feeds to goal rate. Staph Epi in all 4 bottles; ID with recommendation to continue Vanc & de-escalate meropenem to ertapenem on 04/15 through 4/16. Patient completed IV course of UTI coverage. Patient tolerated volume removal well in dialysis  throughout the rest of her hospital stay. Pending discharge to LTACH pending bed availability. Patient without BM with one episode of vomiting overnight 4/17. KUB with bowel gas and low concern for obstruction with no transition point noted. Escalating bowel regimen. Pending placement as of 4/22. Pulm consult placed to evaluate for possible trach downsize & eventual decannulation to assist placement if safe. Trach capping trial per pulm for 48h and will assess for decannulation on Monday. DVT studies negative. Pulm successfully decannulated pt 4/30, IR exchanged TDC. Pending swallow study with SLP and waiting for dispo options. Pt failed swallow study, placement pending. Working with PT on mobilize pt to sitting in a chair to expand placement options. Pt successfully mobilized using sandee lift but required 2 people to do so. Discussing dispo plan with family, likely d/c home if HD, DME needs able to be met    Interval History: NAEON pending placement, DME      Objective:     Vital Signs (Most Recent):  Temp: 98.9 °F (37.2 °C) (05/16/24 0721)  Pulse: 103 (05/16/24 0721)  Resp: 18 (05/16/24 0721)  BP: 125/77 (05/16/24 0721)  SpO2: 97 % (05/16/24 0721) Vital Signs (24h Range):  Temp:  [98.6 °F (37 °C)-99.6 °F (37.6 °C)] 98.9 °F (37.2 °C)  Pulse:  [] 103  Resp:  [17-18] 18  SpO2:  [97 %-100 %] 97 %  BP: ()/(65-91) 125/77     Weight: 122.5 kg (270 lb 1 oz)  Body mass index is 42.3 kg/m².    Intake/Output Summary (Last 24 hours) at 5/16/2024 0924  Last data filed at 5/16/2024 0909  Gross per 24 hour   Intake 2779 ml   Output --   Net 2779 ml         Physical Exam  Vitals and nursing note reviewed.   HENT:      Head: Normocephalic and atraumatic.   Neck:      Comments: Trach removed  Cardiovascular:      Rate and Rhythm: Normal rate and regular rhythm.      Heart sounds: Normal heart sounds.   Pulmonary:      Effort: Pulmonary effort is normal. No respiratory distress.      Breath sounds: Normal breath sounds.    Abdominal:      General: Abdomen is flat. There is no distension.      Palpations: Abdomen is soft.      Tenderness: There is no abdominal tenderness.      Comments: PEG tube   Musculoskeletal:      Right lower leg: No edema.      Left lower leg: No edema.      Comments: Moves UEs spontaneously   Skin:     General: Skin is warm and dry.   Neurological:      General: No focal deficit present.      Mental Status: She is alert.      Comments: Nonverbal, not following commands             Significant Labs: All pertinent labs within the past 24 hours have been reviewed.    Significant Imaging: I have reviewed all pertinent imaging results/findings within the past 24 hours.    Assessment/Plan:      * Acute cystitis with hematuria  resolved    Pt presenting with AMS and worsening lethargy. Pt is non-verbal at baseline, does not follow commands, but was less responsive than normal. Pt found to have a fever. Urinary source suspected based off of urine and CT abd/pelvis. Pt has many prior resistant bacterial infections including urinary sources. Has prior with sensitivity to zosyn and has also improved off ED dose of vanc/zosyn. Lactic elevated a 3.8->3.49 prior to completion of fluid bolus 500ml.    Patient with new fever and tachycardia on 4/25. Low threshold to initiate additional infectious workup and repeat UA.     Plan  S/p course of vanc/ertapenem per ID. Course completed 4/16.  Daily cbc  Contact precautions    *on contact precautions indefinitely while patient still makes urine given pt incontinent per infection control    Debility  Needs:  Wheelchair: patient has a mobility limitation that significantly impairs her ability to participate in one or more mobility related activities of daily living (MRADL's) such as toileting, feeding, dressing, grooming, and bathing in customary locations in the home.  The mobility limitation cannot be sufficiently resolved by the use of a cane or walker.   The use of a manual  wheelchair will significantly improve the patient's ability to participate in MRADLS and the patient will use it on regular basis in the home.  Family/pt has expressed her willingness to use a manual wheelchair in the home.  She also has a caregiver who is capable of assisting in mobility.    Mrs Saravia requires a hospital bed due to her requiring positioning of the body in ways not feasible with an ordinary bed to alleviate pain/ is completely immobile /or limited mobility and cannot independently make changes in body position without the use of the bed.  The positioning of the body cannot be sufficiently resolved by the use of pillows and wedges    Patient has a mobility limitation that significantly impairs their ability to participate in one or more mobility related activities of daily living, including toileting. This deficit can be resolved by using a bedside commode. Patient demonstrates mobility limitations that will cause them to be confined to one room at home without bathroom access for up to 30 days. Using a bedside commode will greatly improve the patient's ability to participate in MRADLs       Complication of vascular dialysis catheter  Cath intermittently clogging, not resolving with cath-hedy, going for IR venogram 4/30 with possible exchange. S/p exchange with IR on 4/30      Constipation  RESOLVED with Bowel regimen  Patient without BM x5d. One episode of vomitus night of 4/17. Some concern for bowel obstruction. Tolerating continuous tube feeds at goal rate with 0cc on residuals. Physical exam with bowel sounds, soft nontender abdomen. KUB without concern for obstruction with bowel gas, lack of transition point.    Patient now with regular bowel movements on bowel regimen.     Plan  - Miralax BID, Senna BID  - compazine PRN    Moderate malnutrition  Nutrition consulted. Most recent weight and BMI monitored-     Measurements:  Wt Readings from Last 1 Encounters:   05/15/24 122.5 kg (270 lb 1 oz)    Body mass index is 42.3 kg/m².    Patient has been screened and assessed by RD.    Malnutrition Type:  Context: acute illness or injury  Level: moderate    Malnutrition Characteristic Summary:  Weight Loss (Malnutrition): 10% in 6 months  Subcutaneous Fat (Malnutrition): mild depletion  Muscle Mass (Malnutrition): mild depletion  Fluid Accumulation (Malnutrition): mild    Interventions/Recommendations (treatment strategy):  1.    -- TF  -- going for swallow study with SLP to assess possibility of pleasure oral feedings --> failed repeatedly    Prolonged QT interval  Qtc 526, limit Qtc prolonging drugs as able      Spastic hemiplegia of right dominant side as late effect of cerebrovascular disease  Important that patient is able to sit in chair for 4h for dialysis placement needs, even if she requires lift/assistance getting into the chair     This patient has Chronic right hemiplegia due to stroke. Physical therapy services has been scheduled. Continue all standard measures for pressure injury prevention and consult wound care for any wounds (chronic or acute).    ESRD (end stage renal disease) on dialysis  Creatine stable for now. BMP reviewed- noted Estimated Creatinine Clearance: 34 mL/min (A) (based on SCr of 2.6 mg/dL (H)). according to latest data. Based on current GFR, CKD stage is end stage.  Monitor UOP and serial BMP and adjust therapy as needed. Renally dose meds. Avoid nephrotoxic medications and procedures.    -- HD MWF  -- nephro following    Status post tracheostomy  Resolved, decannulated 4/30    Acute encephalopathy  Pt is non-verbal and does not follow commands at baseline. Vascular Neurology consulted given acute change from baseline. MRI with concern for evolution of prior strokes with noted differential of T2 hyperintensities including vasculitis vs demyelination. Discussed with Vascular Neurology; low concern for ongoing vasculitis/demyelination, likely represents typical evolution of prior  "infarcts.    Patient at baseline as of 4/22.     Plan  - STAT head imaging for concern of new change in mental status/focal deficit    Type 2 diabetes mellitus with hyperglycemia, with long-term current use of insulin  Patient's FSGs are controlled on current medication regimen.  Last A1c reviewed-   Lab Results   Component Value Date    HGBA1C 10.4 (H) 01/30/2024     Most recent fingerstick glucose reviewed-   Recent Labs   Lab 05/15/24  1653 05/15/24  2107 05/16/24  0033 05/16/24  0812   POCTGLUCOSE 153* 157* 165* 145*       Current correctional scale  Medium  Maintain anti-hyperglycemic dose as follows-   Antihyperglycemics (From admission, onward)      Start     Stop Route Frequency Ordered    04/17/24 1200  insulin aspart U-100 pen 4 Units         -- SubQ Every 6 hours 04/17/24 0938    04/13/24 2100  insulin detemir U-100 (Levemir) pen 27 Units         -- SubQ 2 times daily 04/13/24 0931          Hold Oral hypoglycemics while patient is in the hospital.  POCT glucose q6h    Hyponatremia  Patient has hyponatremia which is uncontrolled,We will aim to correct the sodium by 4-6mEq in 24 hours. We will monitor sodium Daily. The hyponatremia is due to ESRD. Discussed with nephrology, dialysate bath adjusted to account for and correct hyponatremia.  No results for input(s): "NA" in the last 24 hours.      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 Pt underwent extensive course, currently still healing from this, but no longer has wound vac. Pt's wound currently covered with bandage. Picture in media tabs    Plan  Wound care        VTE Risk Mitigation (From admission, onward)           Ordered     heparin (porcine) injection 1,000 Units  As needed (PRN)         05/14/24 1138     heparin (porcine) injection 3,000 Units  As needed (PRN)         04/17/24 0825     heparin (porcine) injection 1,000 Units  As needed (PRN)         04/12/24 0951     heparin (porcine) injection " 7,500 Units  Every 8 hours         04/11/24 0252                    Discharge Planning   ZEKE: 5/17/2024     Code Status: Full Code   Is the patient medically ready for discharge?: No    Reason for patient still in hospital (select all that apply): Patient trending condition  Discharge Plan A: New Nursing Home placement - shelter care facility                  Osito Dos Santos MD  Department of Hospital Medicine   [facility] - Telemetry Stepdown

## 2024-05-16 NOTE — PLAN OF CARE
Problem: Adult Inpatient Plan of Care  Goal: Absence of Hospital-Acquired Illness or Injury  Outcome: Progressing  Intervention: Identify and Manage Fall Risk  Flowsheets (Taken 5/16/2024 1620)  Safety Promotion/Fall Prevention:   assistive device/personal item within reach   lighting adjusted   medications reviewed   side rails raised x 3  Plan of care was reviewed with the pt. AAOx4, nonverbal. VSS. Glucose monitoring was done. Scheduled insulin was given. Pt was turned  Q2. Wound care and a Chest XR was done. Intake and output was documented. No major changes throughout the day. Safety precautions were in placed.

## 2024-05-17 LAB
ALBUMIN SERPL BCP-MCNC: 3 G/DL (ref 3.5–5.2)
ALBUMIN SERPL BCP-MCNC: 3.1 G/DL (ref 3.5–5.2)
ANION GAP SERPL CALC-SCNC: 14 MMOL/L (ref 8–16)
ANION GAP SERPL CALC-SCNC: 16 MMOL/L (ref 8–16)
BUN SERPL-MCNC: 53 MG/DL (ref 6–20)
BUN SERPL-MCNC: 60 MG/DL (ref 6–20)
CALCIUM SERPL-MCNC: 10 MG/DL (ref 8.7–10.5)
CALCIUM SERPL-MCNC: 10.5 MG/DL (ref 8.7–10.5)
CHLORIDE SERPL-SCNC: 89 MMOL/L (ref 95–110)
CHLORIDE SERPL-SCNC: 92 MMOL/L (ref 95–110)
CO2 SERPL-SCNC: 20 MMOL/L (ref 23–29)
CO2 SERPL-SCNC: 21 MMOL/L (ref 23–29)
CREAT SERPL-MCNC: 5 MG/DL (ref 0.5–1.4)
CREAT SERPL-MCNC: 5.7 MG/DL (ref 0.5–1.4)
EST. GFR  (NO RACE VARIABLE): 8.3 ML/MIN/1.73 M^2
EST. GFR  (NO RACE VARIABLE): 9.8 ML/MIN/1.73 M^2
GLUCOSE SERPL-MCNC: 143 MG/DL (ref 70–110)
GLUCOSE SERPL-MCNC: 159 MG/DL (ref 70–110)
PHOSPHATE SERPL-MCNC: 3.3 MG/DL (ref 2.7–4.5)
PHOSPHATE SERPL-MCNC: 3.9 MG/DL (ref 2.7–4.5)
POCT GLUCOSE: 103 MG/DL (ref 70–110)
POCT GLUCOSE: 119 MG/DL (ref 70–110)
POCT GLUCOSE: 143 MG/DL (ref 70–110)
POCT GLUCOSE: 146 MG/DL (ref 70–110)
POCT GLUCOSE: 154 MG/DL (ref 70–110)
POTASSIUM SERPL-SCNC: 3.9 MMOL/L (ref 3.5–5.1)
POTASSIUM SERPL-SCNC: 4.2 MMOL/L (ref 3.5–5.1)
SODIUM SERPL-SCNC: 125 MMOL/L (ref 136–145)
SODIUM SERPL-SCNC: 127 MMOL/L (ref 136–145)

## 2024-05-17 PROCEDURE — 97530 THERAPEUTIC ACTIVITIES: CPT

## 2024-05-17 PROCEDURE — 25000003 PHARM REV CODE 250

## 2024-05-17 PROCEDURE — 63600175 PHARM REV CODE 636 W HCPCS

## 2024-05-17 PROCEDURE — 36415 COLL VENOUS BLD VENIPUNCTURE: CPT

## 2024-05-17 PROCEDURE — 80069 RENAL FUNCTION PANEL: CPT | Performed by: STUDENT IN AN ORGANIZED HEALTH CARE EDUCATION/TRAINING PROGRAM

## 2024-05-17 PROCEDURE — 97535 SELF CARE MNGMENT TRAINING: CPT

## 2024-05-17 PROCEDURE — 80100014 HC HEMODIALYSIS 1:1

## 2024-05-17 PROCEDURE — 20600001 HC STEP DOWN PRIVATE ROOM

## 2024-05-17 PROCEDURE — 92526 ORAL FUNCTION THERAPY: CPT

## 2024-05-17 PROCEDURE — 25000003 PHARM REV CODE 250: Performed by: NURSE PRACTITIONER

## 2024-05-17 PROCEDURE — 63600175 PHARM REV CODE 636 W HCPCS: Performed by: INTERNAL MEDICINE

## 2024-05-17 PROCEDURE — 80069 RENAL FUNCTION PANEL: CPT | Mod: 91

## 2024-05-17 PROCEDURE — 27000207 HC ISOLATION

## 2024-05-17 PROCEDURE — 63600175 PHARM REV CODE 636 W HCPCS: Performed by: NURSE PRACTITIONER

## 2024-05-17 PROCEDURE — 90935 HEMODIALYSIS ONE EVALUATION: CPT | Mod: ,,, | Performed by: INTERNAL MEDICINE

## 2024-05-17 PROCEDURE — 25000242 PHARM REV CODE 250 ALT 637 W/ HCPCS

## 2024-05-17 RX ADMIN — INSULIN DETEMIR 27 UNITS: 100 INJECTION, SOLUTION SUBCUTANEOUS at 09:05

## 2024-05-17 RX ADMIN — INSULIN ASPART 4 UNITS: 100 INJECTION, SOLUTION INTRAVENOUS; SUBCUTANEOUS at 05:05

## 2024-05-17 RX ADMIN — INSULIN ASPART 4 UNITS: 100 INJECTION, SOLUTION INTRAVENOUS; SUBCUTANEOUS at 12:05

## 2024-05-17 RX ADMIN — INSULIN DETEMIR 27 UNITS: 100 INJECTION, SOLUTION SUBCUTANEOUS at 01:05

## 2024-05-17 RX ADMIN — METOPROLOL TARTRATE 25 MG: 25 TABLET, FILM COATED ORAL at 01:05

## 2024-05-17 RX ADMIN — ERYTHROPOIETIN 6100 UNITS: 10000 INJECTION, SOLUTION INTRAVENOUS; SUBCUTANEOUS at 09:05

## 2024-05-17 RX ADMIN — METOPROLOL TARTRATE 25 MG: 25 TABLET, FILM COATED ORAL at 09:05

## 2024-05-17 RX ADMIN — PANTOPRAZOLE SODIUM 40 MG: 40 GRANULE, DELAYED RELEASE ORAL at 01:05

## 2024-05-17 RX ADMIN — Medication 500 MG: at 09:05

## 2024-05-17 RX ADMIN — HEPARIN SODIUM 7500 UNITS: 5000 INJECTION INTRAVENOUS; SUBCUTANEOUS at 09:05

## 2024-05-17 RX ADMIN — Medication 500 MG: at 01:05

## 2024-05-17 RX ADMIN — ASPIRIN 81 MG CHEWABLE TABLET 81 MG: 81 TABLET CHEWABLE at 01:05

## 2024-05-17 RX ADMIN — HEPARIN SODIUM 1000 UNITS: 1000 INJECTION, SOLUTION INTRAVENOUS; SUBCUTANEOUS at 09:05

## 2024-05-17 RX ADMIN — ZINC SULFATE 220 MG (50 MG) CAPSULE 220 MG: CAPSULE at 01:05

## 2024-05-17 RX ADMIN — SODIUM CHLORIDE: 9 INJECTION, SOLUTION INTRAVENOUS at 07:05

## 2024-05-17 RX ADMIN — HEPARIN SODIUM 7500 UNITS: 5000 INJECTION INTRAVENOUS; SUBCUTANEOUS at 06:05

## 2024-05-17 RX ADMIN — ATORVASTATIN CALCIUM 40 MG: 40 TABLET, FILM COATED ORAL at 01:05

## 2024-05-17 RX ADMIN — INSULIN ASPART 4 UNITS: 100 INJECTION, SOLUTION INTRAVENOUS; SUBCUTANEOUS at 06:05

## 2024-05-17 RX ADMIN — PSYLLIUM HUSK 1 PACKET: 3.4 POWDER ORAL at 01:05

## 2024-05-17 RX ADMIN — HEPARIN SODIUM 7500 UNITS: 5000 INJECTION INTRAVENOUS; SUBCUTANEOUS at 01:05

## 2024-05-17 NOTE — PT/OT/SLP PROGRESS
Physical Therapy      Patient Name:  Sarah Saravia   MRN:  [id number]    Patient not seen today secondary to Other (Comment) (2 attempts for tx session: 1. Pt URIEL away at HD in am; 2. OT working with pt in pm). Will follow-up on next scheduled visit.

## 2024-05-17 NOTE — CONSULTS
Patient is status decannulation. Pulmonary contacted for removal of trach suture. Suture removed at bedside without any difficulty.   Continue management as par primary team.      Ledy Rossi MD  PCCM Fellow, [facility]

## 2024-05-17 NOTE — ASSESSMENT & PLAN NOTE
Patient's FSGs are controlled on current medication regimen.  Last A1c reviewed-   Lab Results   Component Value Date    HGBA1C 10.4 (H) 01/30/2024     Most recent fingerstick glucose reviewed-   Recent Labs   Lab 05/17/24  0048 05/17/24  0609 05/17/24  1111 05/17/24  1202   POCTGLUCOSE 154* 146* 103 119*     Current correctional scale  Medium  Maintain anti-hyperglycemic dose as follows-   Antihyperglycemics (From admission, onward)      Start     Stop Route Frequency Ordered    04/17/24 1200  insulin aspart U-100 pen 4 Units         -- SubQ Every 6 hours 04/17/24 0938    04/13/24 2100  insulin detemir U-100 (Levemir) pen 27 Units         -- SubQ 2 times daily 04/13/24 0931          Hold Oral hypoglycemics while patient is in the hospital.  POCT glucose q6h

## 2024-05-17 NOTE — PLAN OF CARE
Problem: Device-Related Complication Risk (Hemodialysis)  Goal: Safe, Effective Therapy Delivery  Outcome: Progressing     Problem: Hemodynamic Instability (Hemodialysis)  Goal: Effective Tissue Perfusion  Outcome: Progressing     Problem: Infection (Hemodialysis)  Goal: Absence of Infection Signs and Symptoms  Outcome: Progressing     Dialysis tx ended to the right chest perm cath, heparin locked, capped, sterile dressing changed.    Net fluid removed: 1544 ml    Report given to nurse  Cait pt awaiting transport from [facility] to room 8092 by bed

## 2024-05-17 NOTE — PROGRESS NOTES
"NEPHROLOGY HEMODIALYSIS NOTE    Sarah Saravia is a 53 y.o. female currently on hemodialysis for removal of uremic toxins and volume management.     Patient seen and evaluated on hemodialysis, tolerating treatment, see HD flowsheet for vitals and assessments.    No Hypotension, chest pain, shortness of breath, cramping, nausea or vomiting.      Labs have been reviewed and the dialysate bath has been adjusted.    Labs:      Recent Labs   Lab 05/15/24  0516 05/17/24  0704 05/17/24  0730   * 125* 127*   K 3.4* 3.9 4.2   CL 92* 89* 92*   CO2 25 20* 21*   BUN 22* 60* 53*   CREATININE 2.6* 5.7* 5.0*   CALCIUM 9.0 10.5 10.0   PHOS 1.6* 3.9 3.3       No results for input(s): "WBC", "HGB", "HCT", "PLT" in the last 168 hours.       Assessment/Plan: ESRD  - Seen on dialysis this morning, tolerating session with current UFR, no complications.    - Will continue dialysis treatments while in-patient  - Continue to monitor intake and output   - Renally dose medications  - Pre/Post dialysis treatment weights  - Renal diet  - Will continue to follow closely.      "

## 2024-05-17 NOTE — PT/OT/SLP PROGRESS
Speech Language Pathology Treatment    Patient Name:  Sarah Saravia   MRN:  [id number]  Admitting Diagnosis: Acute cystitis with hematuria    Recommendations:                 General Recommendations:  Dysphagia therapy and Speech/language therapy  Diet recommendations:  NPO, Liquid Diet Level: NPO Pt may receive thin water for comfort/pleasure.  Aspiration Precautions: Continue alternate means of nutrition, HOB to 90 degrees, Monitor for s/s of aspiration, and Strict aspiration precautions   General Precautions: Standard, aphasia, aspiration, fall, NPO  Communication strategies:  yes/no questions only and go to room if call light pushed    Assessment:     Sarah Saravia is a 53 y.o. female with an SLP diagnosis of Aphasia, Dysphagia, and Cognitive-Linguistic Impairment.     Subjective     Pt remained nonverbal, communicating only with head nods/shakes at times. Daughter present at bedside.     Pain/Comfort:  Pain Rating 1: 0/10    Respiratory Status: Room air    Objective:     Has the patient been evaluated by SLP for swallowing?   Yes  Keep patient NPO? Yes   Current Respiratory Status:        Pt awake/alert and agreeable to participate in PO trials.  HOB elevated to upright position and TV turned off to eliminate distractions.  Pt accepted straw sips of water x 3. She accepted 1/2 tsp bite of pudding, but exhibited prolonged holding and required a straw sip to facilitate swallowing production. Delayed spontaneous coughing present, therefore, no further PO trials were given. SLP emphasized continued recommendations to receive thin liquids for pleasure/comfort only at this time with plan for  SLP services to reassess for readiness for more advanced textures.  Pt's daughter expressed understanding. Pt unable to demonstrate understanding 2/2 aphasia.     Goals:   Multidisciplinary Problems       SLP Goals          Problem: SLP    Goal Priority Disciplines Outcome   SLP Goal     SLP    Description: Speech Language  Pathology Goals  Updated goals expected to be met by 5/10 (goals remain appropriate 5/17 - reassess on 5/24:  1. Pt will participate in ongoing swallowing assessment to determine if appropriate for PO trials for pleasure.   2. Pt will model single step command x 1 given max cues.   3. Pt will answer simple yes/no q's during a structured receptive language task x 1 given max cues.   4. Pt will phonate purposefully upon command x 1 given max cues.   5. Pt will attempt to vocalize/verbalize during automatic speech task x 1 given max cues.   6. Pt will participate in evaluation of ability to utilize basic communication board.     Goals expected to be met by 5/8:  1. Pt will participate in Modified Barium Swallow Study to determine if safe for oral intake. Goal met/attempted 5/2                               Plan:     Patient to be seen:  3 x/week   Plan of Care expires:  05/31/24  Plan of Care reviewed with:  patient, daughter   SLP Follow-Up:  Yes       Discharge recommendations:   (cont SLP services upon d/c)     Time Tracking:     SLP Treatment Date:   05/17/24  Speech Start Time:  1449  Speech Stop Time:  1456     Speech Total Time (min):  7 min    Billable Minutes: Treatment Swallowing Dysfunction 7    05/17/2024

## 2024-05-17 NOTE — NURSING
Nurses Note -- 4 Eyes      05/16/2024   20:25 PM      Skin assessed during: Q Shift Change      [] No Altered Skin Integrity Present    []Prevention Measures Documented      [x] Yes- Altered Skin Integrity Present or Discovered   [] LDA Added if Not in Epic (Describe Wound)   [] New Altered Skin Integrity was Present on Admit and Documented in LDA   [] Wound Image Taken    Wound Care Consulted? Yes    Attending Nurse:  FLORENCIO Nguyen RN     Second RN/Staff Member:  FLORENCIO Ybarra LPN

## 2024-05-17 NOTE — PROGRESS NOTES
Please see previous notes from this SW for continuity.    __________________________________________________  SW contacted [facility] for an update regarding pt's outpt dialysis referral. JULIAN Car is not in yet. [facility] advised SW to contact [facility] as Joceline may be at that unit.    SW contacted [facility] and requested to speak with Tino Car not in yet.    SW contacted [facility] and requested to speak [facility] pt coordinator Jessie. Jessie is not in yet. SW left message with contact number requesting call back.    SW to follow with updates.    UPDATE 1:59PM: SW received notification via [facility] online portal, pt denied for outpt dialysis at [facility] by medical director. SW requested pt's referral is rerouted to next closest [facility] to pt's home, [facility].    SW also uploaded pt's referral to [facility] online portal requesting placement at [facility] [place].    Pt pending medical and financial approval at [facility] and [facility].    Inpt treatment team updated via secure chat.    SW to follow with updates.    Dafne Mccord, GAYLA  [facility]  [phone]

## 2024-05-17 NOTE — SUBJECTIVE & OBJECTIVE
Interval History: No acute events overnight, afebrile, hemodynamically stable.       Review of Systems   Unable to perform ROS: Patient nonverbal     Objective:     Vital Signs (Most Recent):  Temp: 98.6 °F (37 °C) (05/17/24 1200)  Pulse: 102 (05/17/24 1200)  Resp: 17 (05/17/24 1200)  BP: 120/74 (05/17/24 1200)  SpO2: 98 % (05/17/24 1216) Vital Signs (24h Range):  Temp:  [98 °F (36.7 °C)-99.4 °F (37.4 °C)] 98.6 °F (37 °C)  Pulse:  [] 102  Resp:  [16-18] 17  SpO2:  [98 %-100 %] 98 %  BP: (103-152)/(47-93) 120/74     Weight: 105.5 kg (232 lb 9.6 oz)  Body mass index is 36.43 kg/m².    Intake/Output Summary (Last 24 hours) at 5/17/2024 1306  Last data filed at 5/17/2024 1153  Gross per 24 hour   Intake 1751 ml   Output 2094 ml   Net -343 ml         Physical Exam  Vitals and nursing note reviewed.   HENT:      Head: Normocephalic and atraumatic.   Neck:      Comments: Sutures present s/p decannulation  Cardiovascular:      Rate and Rhythm: Normal rate and regular rhythm.      Heart sounds: Normal heart sounds.   Pulmonary:      Effort: Pulmonary effort is normal. No respiratory distress.      Breath sounds: Normal breath sounds.   Abdominal:      General: Bowel sounds are normal. There is no distension.      Palpations: Abdomen is soft.      Tenderness: There is no abdominal tenderness. There is no guarding or rebound.      Comments: PEG tube   Musculoskeletal:      Right lower leg: No edema.      Left lower leg: No edema.      Comments: Moves upper extremities spontaneously   Skin:     General: Skin is warm and dry.   Neurological:      General: No focal deficit present.      Mental Status: She is alert.      Comments: Nonverbal, not following commands                 Significant Labs: All pertinent labs within the past 24 hours have been reviewed.  LABS:  Recent Labs   Lab 05/15/24  0516 05/17/24  0704 05/17/24  0730   * 125* 127*   K 3.4* 3.9 4.2   CL 92* 89* 92*   CO2 25 20* 21*   BUN 22* 60* 53*    CREATININE 2.6* 5.7* 5.0*   * 159* 143*   ANIONGAP 13 16 14     Recent Labs   Lab 05/15/24  0516 05/17/24  0704 05/17/24  0730   PHOS 1.6* 3.9 3.3     Recent Labs   Lab 05/15/24  0516 05/17/24  0704 05/17/24  0730   ALBUMIN 3.4* 3.1* 3.0*     POCT Glucose:   Recent Labs   Lab 05/17/24  0609 05/17/24  1111 05/17/24  1202   POCTGLUCOSE 146* 103 119*             Significant Imaging: I have reviewed all pertinent imaging results/findings within the past 24 hours.      Inpatient Medications:  Continuous Infusions:   dextrose 10 % in water (D10W)   Intravenous Continuous PRN         Scheduled Meds:   ascorbic acid (vitamin C)  500 mg Per G Tube BID    aspirin  81 mg Per G Tube Daily    atorvastatin  40 mg Per G Tube Daily    epoetin merry (PROCRIT) injection  50 Units/kg Intravenous Every Mon, Wed, Fri    heparin (porcine)  7,500 Units Subcutaneous Q8H    insulin aspart U-100  4 Units Subcutaneous Q6H    insulin detemir U-100 (Levemir)  27 Units Subcutaneous BID    metoprolol tartrate  25 mg Per G Tube BID    pantoprazole  40 mg Per G Tube Daily    psyllium husk (aspartame)  1 packet Per G Tube Daily    zinc sulfate  220 mg Per G Tube Daily     PRN Meds:  Current Facility-Administered Medications:     acetaminophen, 650 mg, Per G Tube, Q6H PRN    dextrose 10 % in water (D10W), , Intravenous, Continuous PRN    dextrose 10%, 12.5 g, Intravenous, PRN    dextrose 10%, 25 g, Intravenous, PRN    glucagon (human recombinant), 1 mg, Intramuscular, PRN    glucose, 16 g, Oral, PRN    glucose, 24 g, Oral, PRN    heparin (porcine), 1,000 Units, Intra-Catheter, PRN    heparin (porcine), 3,000 Units, Intravenous, PRN    ondansetron, 4 mg, Intravenous, Q6H PRN    sodium chloride 0.9%, 10 mL, Intravenous, Q12H PRN

## 2024-05-17 NOTE — NURSING
Dialysis tx started to the right IJ perm cath per orders placed by POONAM Shi:    Order Questions    Question Answer   Antibiotics on HD? No   Duration of Treatment 3.5 hours   Dialyzer F180NR   Dialysate Temperature (C) 36.5   Target  mL/min   If unable to maintain flow due to inadequate vascular access patency, patient intolerance (i.e. chest pain, access discomfort) or elevated venous pressure, adjust blood flow rate to a minimum of _____mL/min 100    mL/min   K+ Potassium per Protocol   Ca++ Calcium per Protocol   Na+ Sodium per Protocol   Bicarb Bicarbonate per Protocol   Access to be used Other (please specify)   Target UF 2L   If unable to maintain this UFR due to patient intolerance (i.e. hypotension, chest pain, muscle cramping, nausea or vomiting), adjust UFR to achieve a minimum of _______ liters of UF 0   Fluid Removal Instructions maintain SBP > 90 mmHG       Contact Precautions maintained

## 2024-05-17 NOTE — PLAN OF CARE
Problem: Adult Inpatient Plan of Care  Goal: Absence of Hospital-Acquired Illness or Injury  Outcome: Progressing  Intervention: Prevent and Manage VTE (Venous Thromboembolism) Risk  Flowsheets (Taken 5/17/2024 5241)  VTE Prevention/Management:   ROM (passive) performed   fluids promoted  Plan of care was reviewed with the pt. AAOx4, nonverbal. VSS. Glucose monitoring was done. Scheduled insulin was given. Pt was turned  Q2. Wound care and HD was done. 1.5L was removed. Intake and output was documented. No major changes throughout the day. Safety precautions were in placed

## 2024-05-17 NOTE — PT/OT/SLP PROGRESS
Occupational Therapy   Treatment    Other Staff Present: Paul Odonnellab tech  Name: Sarah Saravia  MRN: [id number]  Admitting Diagnosis:  Acute cystitis with hematuria       Recommendations:     Discharge Recommendations: Moderate Intensity Therapy  Discharge Equipment Recommendations:  hospital bed, lift device, wheelchair  Barriers to discharge:   (increased skilled A required)    Assessment:     Sarah Saravia is a 53 y.o. female with a medical diagnosis of Acute cystitis with hematuria.  She presents with performance deficits affecting function are weakness, impaired endurance, impaired self care skills, impaired functional mobility, decreased lower extremity function, decreased upper extremity function, impaired balance.     Pt engaged well in therapy, demonstrated increased responsiveness to yes/no questions. Pt required total A of 2 persons to complete bed mobility, and total A of 2 persons for STS transfers with 10-20% hip clearance from EOB in 2/4 trials. Pt wld benefit from continued therapy in post acute setting.     Rehab Prognosis:  Good; patient would benefit from acute skilled OT services to address these deficits and reach maximum level of function.       Plan:     Patient to be seen 3 x/week to address the above listed problems via self-care/home management, therapeutic activities, therapeutic exercises, neuromuscular re-education  Plan of Care Expires: 06/13/24  Plan of Care Reviewed with: patient, daughter    Subjective     Chief Complaint: No complaints  Patient/Family Comments/goals: Get better, return home   Pain/Comfort:  Pain Rating 1: 0/10  Pain Rating Post-Intervention 1: 0/10    Objective:     Communicated with: RN prior to session.  Patient found HOB elevated with PEG Tube (midline for dialysis, pulled out during session and RN notified) upon OT entry to room.    General Precautions: Standard, aspiration, aphasia, fall, contact, NPO    Orthopedic Precautions:N/A  Braces:  N/A  Respiratory Status: Room air     Occupational Performance:     Bed Mobility:    Patient completed Rolling/Turning to Left with  total assistance and 2 persons  Patient completed Rolling/Turning to Right with total assistance and 2 persons  Patient completed Scooting/Bridging with total assistance and 2 persons  Patient completed Supine to Sit with total assistance and 2 persons  Patient completed Sit to Supine with total assistance and 2 persons   Pt able to sit EOB ~15min with good upright balance, max A to SBA    Functional Mobility/Transfers:  Patient completed Sit <> Stand Transfer with total assistance and of 2 persons  with  hand-held assist and BLE knee blocking across 4 trials:   1st and 2nd trial: total A of 2 persons with 20% hip clearance  2nd trial: total A/2 persons with 10% hip clearance  3rd trial: total Ax2, no hip clearance  4th trial: total A of 2 persons attempt to scoot towards HOB in sitting, unable    Activities of Daily Living:  Grooming: maximal assistance washing face with warm wash cloth with initiation and prompting, low engagement  Upper Body Dressing: total assistance managing hospital gown to maximize coverage   Toileting: total assistance while sitting on EOB, pt had discharge, completed perineal care when returned to supine and replaced soiled lito pads       [facility] 6 Click ADL: 6    Treatment & Education:  Pt educated on role of OT, POC, and goals for therapy.    POC was dicussed with patient/caregiver, who was included in its development and is in agreement with the identified goals and treatment plan.   Patient and family aware of patient's deficits and therapy progression.   Time provided for therapeutic counseling and discussion of health disposition.   Educated on importance of EOB/OOB mobility, maintaining routine, sitting up in chair, and maximizing independence with ADLs during admission   Pt completed ADLs and functional mobility for treatment session as noted above    Pt/caregiver verbalized understanding and expressed no further concerns/questions.  Updated communication board with level of assist required      Patient left HOB elevated with all lines intact, call button in reach, RN notified, and daughter present    GOALS:   Multidisciplinary Problems       Occupational Therapy Goals          Problem: Occupational Therapy    Goal Priority Disciplines Outcome Interventions   Occupational Therapy Goal     OT, PT/OT Progressing    Description: Goals to be met by: 5/22/24     Patient will increase functional independence with ADLs by performing:    UE Dressing with Maximum Assistance.  Grooming while EOB with Maximum Assistance.  Sitting at edge of bed x5 minutes with Maximum Assistance.  Rolling to Bilateral with Moderate Assistance.   Supine to sit with Maximum Assistance.                         Time Tracking:     OT Date of Treatment: 05/17/24  OT Start Time: 1335  OT Stop Time: 1411  OT Total Time (min): 36 min    Billable Minutes:Self Care/Home Management 18  Therapeutic Activity 18    OT/JOSÉ: OT     Number of JOSÉ visits since last OT visit: 1    5/17/2024

## 2024-05-17 NOTE — PROGRESS NOTES
[facility] - Telemetry 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 History was given from patient's daughter. She was undergoing dialysis today and noticed she was lethargic, less alert than usual self. Pt completed dialysis and still not acting herself. EMS was called, fever of a 100.  On chart review, she did have an episode of large volume emesis around 1700.  Per EMS, she had a slightly elevated temp 100.0°F, glucose 300s.     In the ED: UA 2+ leuks, >100 WBC, many bacteria, WBC 17, CT abd/pelvis concerning for cystitis. Given vanc/zosyn    Overview/Hospital Course:  Patient was admitted to Hospital Medicine service for medical management and evaluation of urosepsis. Patient was continued on vanc/zosyn. Vascular Neurology consulted concerning mental status change with the recommendation to initiate ASA 81 QD and to obtain an MRI to r/o new stroke. Imaging pending. Nephrology was consulted for regularly scheduled dialysis. Afternoon of 4/12, patient was unable to tolerate filtration of volume during dialsis 2/2 hypotension. Patient received 500cc bolus and was transferred back to floor after finishing the session without volume removed. Will monitor for signs of volume overload. Tailoring insulin regimen while uptitrating tube feeds to goal rate. Staph Epi in all 4 bottles; ID with recommendation to continue Vanc & de-escalate meropenem to ertapenem on 04/15 through 4/16. Patient completed IV course of UTI coverage. Patient tolerated volume removal well in dialysis  throughout the rest of her hospital stay. Pending discharge to LTACH pending bed availability. Patient without BM with one episode of vomiting overnight 4/17. KUB with bowel gas and low concern for obstruction with no transition point noted. Escalating bowel regimen. Pending placement as of 4/22. Pulm consult placed to evaluate for possible trach downsize & eventual decannulation to assist placement if safe. Trach capping trial per pulm for 48h and will assess for decannulation on Monday. DVT studies negative. Pulm successfully decannulated pt 4/30, IR exchanged TDC. Pending swallow study with SLP and waiting for dispo options. Pt failed swallow study, placement pending. Working with PT on mobilize pt to sitting in a chair to expand placement options. Pt successfully mobilized using sandee lift but required 2 people to do so. Discussing dispo plan with family, likely d/c home if HD, DME needs able to be met    Interval History: No acute events overnight, afebrile, hemodynamically stable.       Review of Systems   Unable to perform ROS: Patient nonverbal     Objective:     Vital Signs (Most Recent):  Temp: 98.6 °F (37 °C) (05/17/24 1200)  Pulse: 102 (05/17/24 1200)  Resp: 17 (05/17/24 1200)  BP: 120/74 (05/17/24 1200)  SpO2: 98 % (05/17/24 1216) Vital Signs (24h Range):  Temp:  [98 °F (36.7 °C)-99.4 °F (37.4 °C)] 98.6 °F (37 °C)  Pulse:  [] 102  Resp:  [16-18] 17  SpO2:  [98 %-100 %] 98 %  BP: (103-152)/(47-93) 120/74     Weight: 105.5 kg (232 lb 9.6 oz)  Body mass index is 36.43 kg/m².    Intake/Output Summary (Last 24 hours) at 5/17/2024 1306  Last data filed at 5/17/2024 1153  Gross per 24 hour   Intake 1751 ml   Output 2094 ml   Net -343 ml         Physical Exam  Vitals and nursing note reviewed.   HENT:      Head: Normocephalic and atraumatic.   Neck:      Comments: Sutures present s/p decannulation  Cardiovascular:      Rate and Rhythm: Normal rate and regular rhythm.      Heart sounds: Normal heart  sounds.   Pulmonary:      Effort: Pulmonary effort is normal. No respiratory distress.      Breath sounds: Normal breath sounds.   Abdominal:      General: Bowel sounds are normal. There is no distension.      Palpations: Abdomen is soft.      Tenderness: There is no abdominal tenderness. There is no guarding or rebound.      Comments: PEG tube   Musculoskeletal:      Right lower leg: No edema.      Left lower leg: No edema.      Comments: Moves upper extremities spontaneously   Skin:     General: Skin is warm and dry.   Neurological:      General: No focal deficit present.      Mental Status: She is alert.      Comments: Nonverbal, not following commands                 Significant Labs: All pertinent labs within the past 24 hours have been reviewed.  LABS:  Recent Labs   Lab 05/15/24  0516 05/17/24  0704 05/17/24  0730   * 125* 127*   K 3.4* 3.9 4.2   CL 92* 89* 92*   CO2 25 20* 21*   BUN 22* 60* 53*   CREATININE 2.6* 5.7* 5.0*   * 159* 143*   ANIONGAP 13 16 14     Recent Labs   Lab 05/15/24  0516 05/17/24  0704 05/17/24  0730   PHOS 1.6* 3.9 3.3     Recent Labs   Lab 05/15/24  0516 05/17/24  0704 05/17/24  0730   ALBUMIN 3.4* 3.1* 3.0*     POCT Glucose:   Recent Labs   Lab 05/17/24  0609 05/17/24  1111 05/17/24  1202   POCTGLUCOSE 146* 103 119*             Significant Imaging: I have reviewed all pertinent imaging results/findings within the past 24 hours.      Inpatient Medications:  Continuous Infusions:   dextrose 10 % in water (D10W)   Intravenous Continuous PRN         Scheduled Meds:   ascorbic acid (vitamin C)  500 mg Per G Tube BID    aspirin  81 mg Per G Tube Daily    atorvastatin  40 mg Per G Tube Daily    epoetin merry (PROCRIT) injection  50 Units/kg Intravenous Every Mon, Wed, Fri    heparin (porcine)  7,500 Units Subcutaneous Q8H    insulin aspart U-100  4 Units Subcutaneous Q6H    insulin detemir U-100 (Levemir)  27 Units Subcutaneous BID    metoprolol tartrate  25 mg Per G Tube BID     pantoprazole  40 mg Per G Tube Daily    psyllium husk (aspartame)  1 packet Per G Tube Daily    zinc sulfate  220 mg Per G Tube Daily     PRN Meds:  Current Facility-Administered Medications:     acetaminophen, 650 mg, Per G Tube, Q6H PRN    dextrose 10 % in water (D10W), , Intravenous, Continuous PRN    dextrose 10%, 12.5 g, Intravenous, PRN    dextrose 10%, 25 g, Intravenous, PRN    glucagon (human recombinant), 1 mg, Intramuscular, PRN    glucose, 16 g, Oral, PRN    glucose, 24 g, Oral, PRN    heparin (porcine), 1,000 Units, Intra-Catheter, PRN    heparin (porcine), 3,000 Units, Intravenous, PRN    ondansetron, 4 mg, Intravenous, Q6H PRN    sodium chloride 0.9%, 10 mL, Intravenous, Q12H PRN    Assessment/Plan:      * Acute cystitis with hematuria  resolved    Pt presenting with AMS and worsening lethargy. Pt is non-verbal at baseline, does not follow commands, but was less responsive than normal. Pt found to have a fever. Urinary source suspected based off of urine and CT abd/pelvis. Pt has many prior resistant bacterial infections including urinary sources. Has prior with sensitivity to zosyn and has also improved off ED dose of vanc/zosyn. Lactic elevated a 3.8->3.49 prior to completion of fluid bolus 500ml.    Patient with new fever and tachycardia on 4/25. Low threshold to initiate additional infectious workup and repeat UA.     Plan  S/p course of vanc/ertapenem per ID. Course completed 4/16.  Daily cbc  Contact precautions    *on contact precautions indefinitely while patient still makes urine given pt incontinent per infection control    Debility  Needs:  Wheelchair: patient has a mobility limitation that significantly impairs her ability to participate in one or more mobility related activities of daily living (MRADL's) such as toileting, feeding, dressing, grooming, and bathing in customary locations in the home.  The mobility limitation cannot be sufficiently resolved by the use of a cane or walker.    The use of a manual wheelchair will significantly improve the patient's ability to participate in MRADLS and the patient will use it on regular basis in the home.  Family/pt has expressed her willingness to use a manual wheelchair in the home.  She also has a caregiver who is capable of assisting in mobility.    Mrs Saravia requires a hospital bed due to her requiring positioning of the body in ways not feasible with an ordinary bed to alleviate pain/ is completely immobile /or limited mobility and cannot independently make changes in body position without the use of the bed.  The positioning of the body cannot be sufficiently resolved by the use of pillows and wedges    Patient has a mobility limitation that significantly impairs their ability to participate in one or more mobility related activities of daily living, including toileting. This deficit can be resolved by using a bedside commode. Patient demonstrates mobility limitations that will cause them to be confined to one room at home without bathroom access for up to 30 days. Using a bedside commode will greatly improve the patient's ability to participate in MRADLs       Complication of vascular dialysis catheter  Cath intermittently clogging, not resolving with cath-hedy, going for IR venogram 4/30 with possible exchange. S/p exchange with IR on 4/30      Constipation  RESOLVED with Bowel regimen  Patient without BM x5d. One episode of vomitus night of 4/17. Some concern for bowel obstruction. Tolerating continuous tube feeds at goal rate with 0cc on residuals. Physical exam with bowel sounds, soft nontender abdomen. KUB without concern for obstruction with bowel gas, lack of transition point.    Patient now with regular bowel movements on bowel regimen.     Plan  - Miralax BID, Senna BID  - compazine PRN    Moderate malnutrition  Nutrition consulted. Most recent weight and BMI monitored-     Measurements:  Wt Readings from Last 1 Encounters:   05/16/24 105.5  kg (232 lb 9.6 oz)   Body mass index is 36.43 kg/m².    Patient has been screened and assessed by RD.    Malnutrition Type:  Context: acute illness or injury  Level: moderate    Malnutrition Characteristic Summary:  Weight Loss (Malnutrition): 10% in 6 months  Subcutaneous Fat (Malnutrition): mild depletion  Muscle Mass (Malnutrition): mild depletion  Fluid Accumulation (Malnutrition): mild    Interventions/Recommendations (treatment strategy):  1.    -- TF  -- going for swallow study with SLP to assess possibility of pleasure oral feedings --> failed repeatedly    Prolonged QT interval  Qtc 526 [04/10/24]      limit Qtc prolonging drugs as able    Spastic hemiplegia of right dominant side as late effect of cerebrovascular disease  Important that patient is able to sit in chair for 4h for dialysis placement needs, even if she requires lift/assistance getting into the chair     This patient has Chronic right hemiplegia due to stroke. Physical therapy services has been scheduled. Continue all standard measures for pressure injury prevention and consult wound care for any wounds (chronic or acute).    ESRD (end stage renal disease) on dialysis  Creatine stable for now. BMP reviewed- noted Estimated Creatinine Clearance: 34 mL/min (A) (based on SCr of 2.6 mg/dL (H)). according to latest data. Based on current GFR, CKD stage is end stage.  Monitor UOP and serial BMP and adjust therapy as needed. Renally dose meds. Avoid nephrotoxic medications and procedures.    -- HD MWF  -- nephro following    Status post tracheostomy  Resolved, decannulated 4/30    Acute encephalopathy  Pt is non-verbal and does not follow commands at baseline. Vascular Neurology consulted given acute change from baseline. MRI with concern for evolution of prior strokes with noted differential of T2 hyperintensities including vasculitis vs demyelination. Discussed with Vascular Neurology; low concern for ongoing vasculitis/demyelination, likely  represents typical evolution of prior infarcts.    Patient at baseline as of 4/22.     Plan  - STAT head imaging for concern of new change in mental status/focal deficit    Type 2 diabetes mellitus with hyperglycemia, with long-term current use of insulin  Patient's FSGs are controlled on current medication regimen.  Last A1c reviewed-   Lab Results   Component Value Date    HGBA1C 10.4 (H) 01/30/2024     Most recent fingerstick glucose reviewed-   Recent Labs   Lab 05/17/24  0048 05/17/24  0609 05/17/24  1111 05/17/24  1202   POCTGLUCOSE 154* 146* 103 119*     Current correctional scale  Medium  Maintain anti-hyperglycemic dose as follows-   Antihyperglycemics (From admission, onward)      Start     Stop Route Frequency Ordered    04/17/24 1200  insulin aspart U-100 pen 4 Units         -- SubQ Every 6 hours 04/17/24 0938    04/13/24 2100  insulin detemir U-100 (Levemir) pen 27 Units         -- SubQ 2 times daily 04/13/24 0931          Hold Oral hypoglycemics while patient is in the hospital.  POCT glucose q6h    Hyponatremia  Patient has hyponatremia which is uncontrolled,We will aim to correct the sodium by 4-6mEq in 24 hours. We will monitor sodium Daily. The hyponatremia is due to ESRD. Discussed with nephrology, dialysate bath adjusted to account for and correct hyponatremia.  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 Pt underwent extensive course, currently still healing from this, but no longer has wound vac. Pt's wound currently covered with bandage. Picture in media tabs    Plan  Wound care        VTE Risk Mitigation (From admission, onward)           Ordered     heparin (porcine) injection 1,000 Units  As needed (PRN)         05/14/24 1138     heparin (porcine) injection 3,000 Units  As needed (PRN)         04/17/24 0825     heparin (porcine) injection 7,500 Units  Every 8 hours         04/11/24 0252                     Discharge Planning   ZEKE: 5/17/2024     Code Status: Full Code   Is the patient medically ready for discharge?: No    Reason for patient still in hospital (select all that apply): Pending disposition  Discharge Plan A: New Nursing Home placement - [facility] care facility                  Art Glynn DO  Department of Hospital Medicine   [facility] - Telemetry Stepdown

## 2024-05-17 NOTE — ASSESSMENT & PLAN NOTE
Nutrition consulted. Most recent weight and BMI monitored-     Measurements:  Wt Readings from Last 1 Encounters:   05/16/24 105.5 kg (232 lb 9.6 oz)   Body mass index is 36.43 kg/m².    Patient has been screened and assessed by RD.    Malnutrition Type:  Context: acute illness or injury  Level: moderate    Malnutrition Characteristic Summary:  Weight Loss (Malnutrition): 10% in 6 months  Subcutaneous Fat (Malnutrition): mild depletion  Muscle Mass (Malnutrition): mild depletion  Fluid Accumulation (Malnutrition): mild    Interventions/Recommendations (treatment strategy):  1.    -- TF  -- going for swallow study with SLP to assess possibility of pleasure oral feedings --> failed repeatedly

## 2024-05-17 NOTE — ASSESSMENT & PLAN NOTE
Patient has hyponatremia which is uncontrolled,We will aim to correct the sodium by 4-6mEq in 24 hours. We will monitor sodium Daily. The hyponatremia is due to ESRD. Discussed with nephrology, dialysate bath adjusted to account for and correct hyponatremia.  Recent Labs   Lab 05/17/24  0730   *

## 2024-05-18 LAB
POCT GLUCOSE: 149 MG/DL (ref 70–110)
POCT GLUCOSE: 160 MG/DL (ref 70–110)
POCT GLUCOSE: 164 MG/DL (ref 70–110)
POCT GLUCOSE: 166 MG/DL (ref 70–110)
POCT GLUCOSE: 170 MG/DL (ref 70–110)
POCT GLUCOSE: 174 MG/DL (ref 70–110)
POCT GLUCOSE: 180 MG/DL (ref 70–110)
TB INDURATION 48 - 72 HR READ: NORMAL
TB SKIN TEST 48 - 72 HR READ: NORMAL

## 2024-05-18 PROCEDURE — 99232 SBSQ HOSP IP/OBS MODERATE 35: CPT | Mod: ,,, | Performed by: NURSE PRACTITIONER

## 2024-05-18 PROCEDURE — 25000003 PHARM REV CODE 250

## 2024-05-18 PROCEDURE — 25000242 PHARM REV CODE 250 ALT 637 W/ HCPCS

## 2024-05-18 PROCEDURE — 27000207 HC ISOLATION

## 2024-05-18 PROCEDURE — 93005 ELECTROCARDIOGRAM TRACING: CPT

## 2024-05-18 PROCEDURE — 93010 ELECTROCARDIOGRAM REPORT: CPT | Mod: ,,, | Performed by: INTERNAL MEDICINE

## 2024-05-18 PROCEDURE — 20600001 HC STEP DOWN PRIVATE ROOM

## 2024-05-18 PROCEDURE — 63600175 PHARM REV CODE 636 W HCPCS

## 2024-05-18 RX ADMIN — HEPARIN SODIUM 7500 UNITS: 5000 INJECTION INTRAVENOUS; SUBCUTANEOUS at 02:05

## 2024-05-18 RX ADMIN — INSULIN ASPART 4 UNITS: 100 INJECTION, SOLUTION INTRAVENOUS; SUBCUTANEOUS at 06:05

## 2024-05-18 RX ADMIN — HEPARIN SODIUM 7500 UNITS: 5000 INJECTION INTRAVENOUS; SUBCUTANEOUS at 05:05

## 2024-05-18 RX ADMIN — ASPIRIN 81 MG CHEWABLE TABLET 81 MG: 81 TABLET CHEWABLE at 08:05

## 2024-05-18 RX ADMIN — METOPROLOL TARTRATE 25 MG: 25 TABLET, FILM COATED ORAL at 08:05

## 2024-05-18 RX ADMIN — INSULIN DETEMIR 27 UNITS: 100 INJECTION, SOLUTION SUBCUTANEOUS at 09:05

## 2024-05-18 RX ADMIN — PANTOPRAZOLE SODIUM 40 MG: 40 GRANULE, DELAYED RELEASE ORAL at 08:05

## 2024-05-18 RX ADMIN — ZINC SULFATE 220 MG (50 MG) CAPSULE 220 MG: CAPSULE at 08:05

## 2024-05-18 RX ADMIN — INSULIN DETEMIR 27 UNITS: 100 INJECTION, SOLUTION SUBCUTANEOUS at 08:05

## 2024-05-18 RX ADMIN — ATORVASTATIN CALCIUM 40 MG: 40 TABLET, FILM COATED ORAL at 08:05

## 2024-05-18 RX ADMIN — PSYLLIUM HUSK 1 PACKET: 3.4 POWDER ORAL at 09:05

## 2024-05-18 RX ADMIN — Medication 500 MG: at 09:05

## 2024-05-18 RX ADMIN — INSULIN ASPART 4 UNITS: 100 INJECTION, SOLUTION INTRAVENOUS; SUBCUTANEOUS at 12:05

## 2024-05-18 RX ADMIN — HEPARIN SODIUM 7500 UNITS: 5000 INJECTION INTRAVENOUS; SUBCUTANEOUS at 09:05

## 2024-05-18 RX ADMIN — INSULIN ASPART 4 UNITS: 100 INJECTION, SOLUTION INTRAVENOUS; SUBCUTANEOUS at 02:05

## 2024-05-18 RX ADMIN — INSULIN ASPART 4 UNITS: 100 INJECTION, SOLUTION INTRAVENOUS; SUBCUTANEOUS at 05:05

## 2024-05-18 RX ADMIN — METOPROLOL TARTRATE 25 MG: 25 TABLET, FILM COATED ORAL at 09:05

## 2024-05-18 RX ADMIN — Medication 500 MG: at 08:05

## 2024-05-18 NOTE — SUBJECTIVE & OBJECTIVE
Interval History: NAEON. Pending placement. Tachycardia x1 recorded in chart, ECG showing NSR with HR 93 no concerns at this time.      Objective:     Vital Signs (Most Recent):  Temp: 98.4 °F (36.9 °C) (05/18/24 0713)  Pulse: (!) 123 (05/18/24 0713)  Resp: 18 (05/18/24 0713)  BP: 127/77 (05/18/24 0851)  SpO2: 100 % (05/18/24 0900) Vital Signs (24h Range):  Temp:  [98.4 °F (36.9 °C)-98.7 °F (37.1 °C)] 98.4 °F (36.9 °C)  Pulse:  [] 123  Resp:  [17-19] 18  SpO2:  [98 %-100 %] 100 %  BP: (109-152)/(63-85) 127/77     Weight: 105.5 kg (232 lb 9.6 oz)  Body mass index is 36.43 kg/m².    Intake/Output Summary (Last 24 hours) at 5/18/2024 1006  Last data filed at 5/18/2024 0951  Gross per 24 hour   Intake 1550 ml   Output 2094 ml   Net -544 ml         Physical Exam  Vitals and nursing note reviewed.   HENT:      Head: Normocephalic and atraumatic.   Neck:      Comments: Trach removed  Cardiovascular:      Rate and Rhythm: Normal rate and regular rhythm.      Heart sounds: Normal heart sounds.   Pulmonary:      Effort: Pulmonary effort is normal. No respiratory distress.      Breath sounds: Normal breath sounds.   Abdominal:      General: Abdomen is flat. There is no distension.      Palpations: Abdomen is soft.      Tenderness: There is no abdominal tenderness.      Comments: PEG tube   Musculoskeletal:      Right lower leg: No edema.      Left lower leg: No edema.      Comments: Moves UEs spontaneously   Skin:     General: Skin is warm and dry.   Neurological:      General: No focal deficit present.      Mental Status: She is alert.      Comments: Nonverbal, not following commands             Significant Labs: All pertinent labs within the past 24 hours have been reviewed.    Significant Imaging: I have reviewed all pertinent imaging results/findings within the past 24 hours.

## 2024-05-18 NOTE — PROGRESS NOTES
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 History was given from patient's daughter. She was undergoing dialysis today and noticed she was lethargic, less alert than usual self. Pt completed dialysis and still not acting herself. EMS was called, fever of a 100.  On chart review, she did have an episode of large volume emesis around 1700.  Per EMS, she had a slightly elevated temp 100.0°F, glucose 300s.     In the ED: UA 2+ leuks, >100 WBC, many bacteria, WBC 17, CT abd/pelvis concerning for cystitis. Given vanc/zosyn    Overview/Hospital Course:  Patient was admitted to Hospital Medicine service for medical management and evaluation of urosepsis. Patient was continued on vanc/zosyn. Vascular Neurology consulted concerning mental status change with the recommendation to initiate ASA 81 QD and to obtain an MRI to r/o new stroke. Imaging pending. Nephrology was consulted for regularly scheduled dialysis. Afternoon of 4/12, patient was unable to tolerate filtration of volume during dialsis 2/2 hypotension. Patient received 500cc bolus and was transferred back to floor after finishing the session without volume removed. Will monitor for signs of volume overload. Tailoring insulin regimen while uptitrating tube feeds to goal rate. Staph Epi in all 4 bottles; ID with recommendation to continue Vanc & de-escalate meropenem to ertapenem on 04/15 through 4/16. Patient completed IV course of UTI coverage. Patient tolerated volume removal well in dialysis  throughout the rest of her hospital stay. Pending discharge to LTACH pending bed availability. Patient without BM with one episode of vomiting overnight 4/17. KUB with bowel gas and low concern for obstruction with no transition point noted. Escalating bowel regimen. Pending placement as of 4/22. Pulm consult placed to evaluate for possible trach downsize & eventual decannulation to assist placement if safe. Trach capping trial per pulm for 48h and will assess for decannulation on Monday. DVT studies negative. Pulm successfully decannulated pt 4/30, IR exchanged TDC. Pending swallow study with SLP and waiting for dispo options. Pt failed swallow study, placement pending. Working with PT on mobilize pt to sitting in a chair to expand placement options. Pt successfully mobilized using sandee lift but required 2 people to do so. Discussing dispo plan with family, likely d/c home if HD, DME needs able to be met. Pending acceptance at outpatient HD center    Interval History: NAEON. Pending placement. Tachycardia x1 recorded in chart, ECG showing NSR with HR 93 no concerns at this time.      Objective:     Vital Signs (Most Recent):  Temp: 98.4 °F (36.9 °C) (05/18/24 0713)  Pulse: (!) 123 (05/18/24 0713)  Resp: 18 (05/18/24 0713)  BP: 127/77 (05/18/24 0851)  SpO2: 100 % (05/18/24 0900) Vital Signs (24h Range):  Temp:  [98.4 °F (36.9 °C)-98.7 °F (37.1 °C)] 98.4 °F (36.9 °C)  Pulse:  [] 123  Resp:  [17-19] 18  SpO2:  [98 %-100 %] 100 %  BP: (109-152)/(63-85) 127/77     Weight: 105.5 kg (232 lb 9.6 oz)  Body mass index is 36.43 kg/m².    Intake/Output Summary (Last 24 hours) at 5/18/2024 1006  Last data filed at 5/18/2024 0951  Gross per 24 hour   Intake 1550 ml   Output 2094 ml   Net -544 ml         Physical Exam  Vitals and nursing note reviewed.   HENT:      Head: Normocephalic and atraumatic.   Neck:      Comments: Trach removed  Cardiovascular:      Rate and Rhythm: Normal rate and regular rhythm.      Heart  sounds: Normal heart sounds.   Pulmonary:      Effort: Pulmonary effort is normal. No respiratory distress.      Breath sounds: Normal breath sounds.   Abdominal:      General: Abdomen is flat. There is no distension.      Palpations: Abdomen is soft.      Tenderness: There is no abdominal tenderness.      Comments: PEG tube   Musculoskeletal:      Right lower leg: No edema.      Left lower leg: No edema.      Comments: Moves UEs spontaneously   Skin:     General: Skin is warm and dry.   Neurological:      General: No focal deficit present.      Mental Status: She is alert.      Comments: Nonverbal, not following commands             Significant Labs: All pertinent labs within the past 24 hours have been reviewed.    Significant Imaging: I have reviewed all pertinent imaging results/findings within the past 24 hours.    Assessment/Plan:      * Acute cystitis with hematuria  resolved    Pt presenting with AMS and worsening lethargy. Pt is non-verbal at baseline, does not follow commands, but was less responsive than normal. Pt found to have a fever. Urinary source suspected based off of urine and CT abd/pelvis. Pt has many prior resistant bacterial infections including urinary sources. Has prior with sensitivity to zosyn and has also improved off ED dose of vanc/zosyn. Lactic elevated a 3.8->3.49 prior to completion of fluid bolus 500ml.    Patient with new fever and tachycardia on 4/25. Low threshold to initiate additional infectious workup and repeat UA.     Plan  S/p course of vanc/ertapenem per ID. Course completed 4/16.  Daily cbc  Contact precautions    *on contact precautions indefinitely while patient still makes urine given pt incontinent per infection control    Debility  Needs:  Wheelchair: patient has a mobility limitation that significantly impairs her ability to participate in one or more mobility related activities of daily living (MRADL's) such as toileting, feeding, dressing, grooming, and bathing in  customary locations in the home.  The mobility limitation cannot be sufficiently resolved by the use of a cane or walker.   The use of a manual wheelchair will significantly improve the patient's ability to participate in MRADLS and the patient will use it on regular basis in the home.  Family/pt has expressed her willingness to use a manual wheelchair in the home.  She also has a caregiver who is capable of assisting in mobility.    Mrs Saravia requires a hospital bed due to her requiring positioning of the body in ways not feasible with an ordinary bed to alleviate pain/ is completely immobile /or limited mobility and cannot independently make changes in body position without the use of the bed.  The positioning of the body cannot be sufficiently resolved by the use of pillows and wedges    Patient has a mobility limitation that significantly impairs their ability to participate in one or more mobility related activities of daily living, including toileting. This deficit can be resolved by using a bedside commode. Patient demonstrates mobility limitations that will cause them to be confined to one room at home without bathroom access for up to 30 days. Using a bedside commode will greatly improve the patient's ability to participate in MRADLs       Complication of vascular dialysis catheter  Cath intermittently clogging, not resolving with cath-hedy, going for IR venogram 4/30 with possible exchange. S/p exchange with IR on 4/30      Constipation  RESOLVED with Bowel regimen  Patient without BM x5d. One episode of vomitus night of 4/17. Some concern for bowel obstruction. Tolerating continuous tube feeds at goal rate with 0cc on residuals. Physical exam with bowel sounds, soft nontender abdomen. KUB without concern for obstruction with bowel gas, lack of transition point.    Patient now with regular bowel movements on bowel regimen.     Plan  - Miralax BID, Senna BID  - compazine PRN    Moderate  malnutrition  Nutrition consulted. Most recent weight and BMI monitored-     Measurements:  Wt Readings from Last 1 Encounters:   05/16/24 105.5 kg (232 lb 9.6 oz)   Body mass index is 36.43 kg/m².    Patient has been screened and assessed by RD.    Malnutrition Type:  Context: acute illness or injury  Level: moderate    Malnutrition Characteristic Summary:  Weight Loss (Malnutrition): 10% in 6 months  Subcutaneous Fat (Malnutrition): mild depletion  Muscle Mass (Malnutrition): mild depletion  Fluid Accumulation (Malnutrition): mild    Interventions/Recommendations (treatment strategy):  1.    -- TF  -- going for swallow study with SLP to assess possibility of pleasure oral feedings --> failed repeatedly    Prolonged QT interval  Qtc 526 [04/10/24]      limit Qtc prolonging drugs as able    Spastic hemiplegia of right dominant side as late effect of cerebrovascular disease  Important that patient is able to sit in chair for 4h for dialysis placement needs, even if she requires lift/assistance getting into the chair     This patient has Chronic right hemiplegia due to stroke. Physical therapy services has been scheduled. Continue all standard measures for pressure injury prevention and consult wound care for any wounds (chronic or acute).    ESRD (end stage renal disease) on dialysis  Creatine stable for now. BMP reviewed- noted Estimated Creatinine Clearance: 16.3 mL/min (A) (based on SCr of 5 mg/dL (H)). according to latest data. Based on current GFR, CKD stage is end stage.  Monitor UOP and serial BMP and adjust therapy as needed. Renally dose meds. Avoid nephrotoxic medications and procedures.    -- HD MWF  -- nephro following    Status post tracheostomy  Resolved, decannulated 4/30    Acute encephalopathy  Pt is non-verbal and does not follow commands at baseline. Vascular Neurology consulted given acute change from baseline. MRI with concern for evolution of prior strokes with noted differential of T2  "hyperintensities including vasculitis vs demyelination. Discussed with Vascular Neurology; low concern for ongoing vasculitis/demyelination, likely represents typical evolution of prior infarcts.    Patient at baseline as of 4/22.     Plan  - STAT head imaging for concern of new change in mental status/focal deficit    Type 2 diabetes mellitus with hyperglycemia, with long-term current use of insulin  Patient's FSGs are controlled on current medication regimen.  Last A1c reviewed-   Lab Results   Component Value Date    HGBA1C 10.4 (H) 01/30/2024     Most recent fingerstick glucose reviewed-   Recent Labs   Lab 05/17/24  2124 05/18/24  0159 05/18/24  0542 05/18/24  0818   POCTGLUCOSE 149* 180* 174* 170*       Current correctional scale  Medium  Maintain anti-hyperglycemic dose as follows-   Antihyperglycemics (From admission, onward)      Start     Stop Route Frequency Ordered    04/17/24 1200  insulin aspart U-100 pen 4 Units         -- SubQ Every 6 hours 04/17/24 0938    04/13/24 2100  insulin detemir U-100 (Levemir) pen 27 Units         -- SubQ 2 times daily 04/13/24 0931          Hold Oral hypoglycemics while patient is in the hospital.  POCT glucose q6h    Hyponatremia  Patient has hyponatremia which is uncontrolled,We will aim to correct the sodium by 4-6mEq in 24 hours. We will monitor sodium Daily. The hyponatremia is due to ESRD. Discussed with nephrology, dialysate bath adjusted to account for and correct hyponatremia.  No results for input(s): "NA" in the last 24 hours.      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 Pt underwent extensive course, currently still healing from this, but no longer has wound vac. Pt's wound currently covered with bandage. Picture in media tabs    Plan  Wound care        VTE Risk Mitigation (From admission, onward)           Ordered     heparin (porcine) injection 1,000 Units  As needed (PRN)         05/14/24 1138     heparin " (porcine) injection 3,000 Units  As needed (PRN)         04/17/24 0825     heparin (porcine) injection 7,500 Units  Every 8 hours         04/11/24 0252                    Discharge Planning   ZEKE: 5/17/2024     Code Status: Full Code   Is the patient medically ready for discharge?: No    Reason for patient still in hospital (select all that apply): Patient trending condition  Discharge Plan A: New Nursing Home placement - assisted care facility                  Osito Dos Santos MD  Department of Hospital Medicine   [facility] - Telemetry Stepdown

## 2024-05-18 NOTE — NURSING
Nurses Note -- 4 Eyes      5/18/2024   6:16 AM      Skin assessed during: Daily Assessment      [x] No Altered Skin Integrity Present    [x]Prevention Measures Documented      [] Yes- Altered Skin Integrity Present or Discovered   [] LDA Added if Not in Epic (Describe Wound)   [] New Altered Skin Integrity was Present on Admit and Documented in LDA   [] Wound Image Taken    Patient had no altered skin integrity with socorro score of 13    Wound Care Consulted? No    Attending Nurse:  Charisma Eastman RN/Staff Member: Shy

## 2024-05-18 NOTE — ASSESSMENT & PLAN NOTE
"53 y.o. Black or  Female ESRD-HD M-W-F at [facility] presents to ED on 4/10/2024 with UTI.    Of note, the patient was initiated on RRT in February 2024 after being admitted with ALVARADO 2/2 iATN due to septic shock. Perm cath placed on 2/29/24. She was transferred to [facility] on 3/12. Deemed ESRD at [facility].  Nephrology consulted for inpatient ESRD-HD management    Assessment:     - Continue MWF iHD schedule while IP.   - Hyponatremic in setting of ESRD - consider decreasing FWF   - Continue to monitor intake and output  - Please avoid gadolinium, fleets, phos-based laxatives, NSAIDs  - Dialysis thrice weekly unless more urgent indications arise. Will evaluate RRT requirements Daily.    Anemia of ESRD hgb 9.7 c/w EPO with HD  No results for input(s): "WBC", "HGB", "HCT", "PLT" in the last 168 hours.    Lab Results   Component Value Date    FESATURATED 10 (L) 03/15/2024    FERRITIN 414 (H) 03/15/2024       - Goal in ESRD is Hgb of 10-11.       CKD/MBD:   - hypercalcemia resolved with HD was 10.7 down to 9 with low ca bath. Phos low but labs were drawn while patient on dialysis likely not accurate recheck RFP in am. Agree with holding sevelamer for now   - F/U PO4, Mg, Calcium. And albumin levels daily.     "

## 2024-05-18 NOTE — PROGRESS NOTES
[facility] - Telemetry Stepdown  Nephrology  Progress Note    Patient Name: Sarah Saravia  MRN: [id number]  Admission Date: 4/10/2024  Hospital Length of Stay: 37 days  Attending Provider: João Hughes DO   Primary Care Physician: Deanna, Primary Doctor  Principal Problem:Acute cystitis with hematuria    Subjective:       Interval History: HD yesterday, tolerated well. Net UF 1.5L. Pending placement     Review of patient's allergies indicates:  No Known Allergies  Current Facility-Administered Medications   Medication Frequency    acetaminophen oral solution 650 mg Q6H PRN    ascorbic acid (vitamin C) tablet 500 mg BID    aspirin chewable tablet 81 mg Daily    atorvastatin tablet 40 mg Daily    dextrose 10 % infusion Continuous PRN    dextrose 10% bolus 125 mL 125 mL PRN    dextrose 10% bolus 250 mL 250 mL PRN    epoetin merry injection 6,100 Units Every Mon, Wed, Fri    glucagon (human recombinant) injection 1 mg PRN    glucose chewable tablet 16 g PRN    glucose chewable tablet 24 g PRN    heparin (porcine) injection 1,000 Units PRN    heparin (porcine) injection 3,000 Units PRN    heparin (porcine) injection 7,500 Units Q8H    insulin aspart U-100 pen 4 Units Q6H    insulin detemir U-100 (Levemir) pen 27 Units BID    metoprolol tartrate (LOPRESSOR) tablet 25 mg BID    ondansetron injection 4 mg Q6H PRN    pantoprazole suspension 40 mg Daily    psyllium husk (aspartame) 3.4 gram packet 1 packet Daily    sodium chloride 0.9% flush 10 mL Q12H PRN    zinc sulfate capsule 220 mg Daily       Objective:     Vital Signs (Most Recent):  Temp: 98.7 °F (37.1 °C) (05/18/24 1133)  Pulse: 95 (05/18/24 1133)  Resp: 19 (05/18/24 1133)  BP: 127/78 (05/18/24 1133)  SpO2: 100 % (05/18/24 1133) Vital Signs (24h Range):  Temp:  [98.4 °F (36.9 °C)-98.7 °F (37.1 °C)] 98.7 °F (37.1 °C)  Pulse:  [] 95  Resp:  [17-19] 19  SpO2:  [98 %-100 %] 100 %  BP: (109-127)/(63-85) 127/78     Weight: 105.5 kg (232 lb 9.6 oz) (05/16/24 1121)  Body  "mass index is 36.43 kg/m².  Body surface area is 2.23 meters squared.    I/O last 3 completed shifts:  In: 2057 [I.V.:250; Other:300; NG/GT:1507]  Out: 2094 [Other:2094]     Physical Exam  Vitals and nursing note reviewed.   HENT:      Head: Normocephalic and atraumatic.   Neck:      Comments: Trach removed  Cardiovascular:      Rate and Rhythm: Normal rate and regular rhythm.      Heart sounds: Normal heart sounds.   Pulmonary:      Effort: Pulmonary effort is normal. No respiratory distress.      Breath sounds: Normal breath sounds.   Abdominal:      General: Abdomen is flat. There is no distension.      Palpations: Abdomen is soft.      Tenderness: There is no abdominal tenderness.      Comments: PEG tube   Musculoskeletal:      Right lower leg: No edema.      Left lower leg: No edema.      Comments: Moves UEs spontaneously   Skin:     General: Skin is warm and dry.   Neurological:      General: No focal deficit present.      Mental Status: She is alert.      Comments: Nonverbal        Significant Labs:  CBC: No results for input(s): "WBC", "RBC", "HGB", "HCT", "PLT", "MCV", "MCH", "MCHC" in the last 168 hours.  CMP:   Recent Labs   Lab 05/17/24  0730   *   CALCIUM 10.0   ALBUMIN 3.0*   *   K 4.2   CO2 21*   CL 92*   BUN 53*   CREATININE 5.0*     All labs within the past 24 hours have been reviewed.       Assessment/Plan:     Renal/  * Acute cystitis with hematuria  - defer to primary     ESRD (end stage renal disease) on dialysis  53 y.o. Black or  Female ESRD-HD M-W-F at [facility] presents to ED on 4/10/2024 with UTI.    Of note, the patient was initiated on RRT in February 2024 after being admitted with ALVARADO 2/2 iATN due to septic shock. Perm cath placed on 2/29/24. She was transferred to [facility] on 3/12. Deemed ESRD at [facility].  Nephrology consulted for inpatient ESRD-HD management    Assessment:     - Continue MWF iHD schedule while IP.   - Hyponatremic in setting of ESRD - consider " "decreasing FWF   - Continue to monitor intake and output  - Please avoid gadolinium, fleets, phos-based laxatives, NSAIDs  - Dialysis thrice weekly unless more urgent indications arise. Will evaluate RRT requirements Daily.    Anemia of ESRD hgb 9.7 c/w EPO with HD  No results for input(s): "WBC", "HGB", "HCT", "PLT" in the last 168 hours.    Lab Results   Component Value Date    FESATURATED 10 (L) 03/15/2024    FERRITIN 414 (H) 03/15/2024       - Goal in ESRD is Hgb of 10-11.       CKD/MBD:   - hypercalcemia resolved with HD was 10.7 down to 9 with low ca bath. Phos low but labs were drawn while patient on dialysis likely not accurate recheck RFP in am. Agree with holding sevelamer for now   - F/U PO4, Mg, Calcium. And albumin levels daily.       Endocrine  Hyponatremia  Free water restriction   Will continue to adjust with HD         Thank you for your consult. I will follow-up with patient. Please contact us if you have any additional questions.    Delfina Shi DNP  Nephrology  [facility] - Telemetry Stepdown  "

## 2024-05-18 NOTE — ASSESSMENT & PLAN NOTE
Patient's FSGs are controlled on current medication regimen.  Last A1c reviewed-   Lab Results   Component Value Date    HGBA1C 10.4 (H) 01/30/2024     Most recent fingerstick glucose reviewed-   Recent Labs   Lab 05/17/24  2124 05/18/24  0159 05/18/24  0542 05/18/24  0818   POCTGLUCOSE 149* 180* 174* 170*       Current correctional scale  Medium  Maintain anti-hyperglycemic dose as follows-   Antihyperglycemics (From admission, onward)    Start     Stop Route Frequency Ordered    04/17/24 1200  insulin aspart U-100 pen 4 Units         -- SubQ Every 6 hours 04/17/24 0938    04/13/24 2100  insulin detemir U-100 (Levemir) pen 27 Units         -- SubQ 2 times daily 04/13/24 0931        Hold Oral hypoglycemics while patient is in the hospital.  POCT glucose q6h

## 2024-05-18 NOTE — PLAN OF CARE
Problem: Infection  Goal: Absence of Infection Signs and Symptoms  Outcome: Progressing     Problem: Skin Injury Risk Increased  Goal: Skin Health and Integrity  Outcome: Progressing     Problem: Adult Inpatient Plan of Care  Goal: Plan of Care Review  Outcome: Progressing  Goal: Patient-Specific Goal (Individualized)  Outcome: Progressing  Goal: Absence of Hospital-Acquired Illness or Injury  Outcome: Progressing  Goal: Optimal Comfort and Wellbeing  Outcome: Progressing  Goal: Readiness for Transition of Care  Outcome: Progressing     Problem: Bariatric Environmental Safety  Goal: Safety Maintained with Care  Outcome: Progressing     Problem: Device-Related Complication Risk (Hemodialysis)  Goal: Safe, Effective Therapy Delivery  Outcome: Progressing     Problem: Hemodynamic Instability (Hemodialysis)  Goal: Effective Tissue Perfusion  Outcome: Progressing     Problem: Infection (Hemodialysis)  Goal: Absence of Infection Signs and Symptoms  Outcome: Progressing     Problem: Diabetes Comorbidity  Goal: Blood Glucose Level Within Targeted Range  Outcome: Progressing  Intervention: Monitor and Manage Glycemia  Flowsheets (Taken 5/18/2024 5434)  Glycemic Management: blood glucose monitored     Problem: Adjustment to Illness (Sepsis/Septic Shock)  Goal: Optimal Coping  Outcome: Progressing     Problem: Glycemic Control Impaired (Sepsis/Septic Shock)  Goal: Blood Glucose Level Within Desired Range  Outcome: Progressing     Problem: Infection Progression (Sepsis/Septic Shock)  Goal: Absence of Infection Signs and Symptoms  Outcome: Progressing  Intervention: Initiate Sepsis Management  Flowsheets (Taken 5/18/2024 4040)  Infection Prevention: rest/sleep promoted     Problem: Nutrition Impaired (Sepsis/Septic Shock)  Goal: Optimal Nutrition Intake  Outcome: Progressing     Problem: Fall Injury Risk  Goal: Absence of Fall and Fall-Related Injury  Outcome: Progressing     Problem: Adjustment to Illness (Chronic Kidney  Disease)  Goal: Optimal Coping with Chronic Illness  Outcome: Progressing  Goal: Electrolyte Balance  Outcome: Progressing  Goal: Fluid Balance  Outcome: Progressing     Problem: Electrolyte Imbalance (Chronic Kidney Disease)  Goal: Electrolyte Balance  Outcome: Progressing     Problem: Fluid Volume Excess (Chronic Kidney Disease)  Goal: Fluid Balance  Outcome: Progressing     Problem: Functional Decline (Chronic Kidney Disease)  Goal: Optimal Functional Ability  Outcome: Progressing     Problem: Hematologic Alteration (Chronic Kidney Disease)  Goal: Absence of Anemia Signs and Symptoms  Outcome: Progressing     Problem: Oral Intake Inadequate (Chronic Kidney Disease)  Goal: Optimal Oral Intake  Outcome: Progressing     Problem: Pain (Chronic Kidney Disease)  Goal: Acceptable Pain Control  Outcome: Progressing     Problem: Renal Function Impairment (Chronic Kidney Disease)  Goal: Minimize Renal Failure Effects  Outcome: Progressing     Problem: Restraint, Nonviolent  Goal: Absence of Harm or Injury  Outcome: Progressing     Problem: Wound  Goal: Optimal Coping  Outcome: Progressing  Goal: Optimal Functional Ability  Outcome: Progressing  Goal: Absence of Infection Signs and Symptoms  Outcome: Progressing  Goal: Improved Oral Intake  Outcome: Progressing  Goal: Optimal Pain Control and Function  Outcome: Progressing  Goal: Skin Health and Integrity  Outcome: Progressing  Goal: Optimal Wound Healing  Outcome: Progressing

## 2024-05-18 NOTE — SUBJECTIVE & OBJECTIVE
Interval History: HD yesterday, tolerated well. Net UF 1.5L. Pending placement     Review of patient's allergies indicates:  No Known Allergies  Current Facility-Administered Medications   Medication Frequency    acetaminophen oral solution 650 mg Q6H PRN    ascorbic acid (vitamin C) tablet 500 mg BID    aspirin chewable tablet 81 mg Daily    atorvastatin tablet 40 mg Daily    dextrose 10 % infusion Continuous PRN    dextrose 10% bolus 125 mL 125 mL PRN    dextrose 10% bolus 250 mL 250 mL PRN    epoetin merry injection 6,100 Units Every Mon, Wed, Fri    glucagon (human recombinant) injection 1 mg PRN    glucose chewable tablet 16 g PRN    glucose chewable tablet 24 g PRN    heparin (porcine) injection 1,000 Units PRN    heparin (porcine) injection 3,000 Units PRN    heparin (porcine) injection 7,500 Units Q8H    insulin aspart U-100 pen 4 Units Q6H    insulin detemir U-100 (Levemir) pen 27 Units BID    metoprolol tartrate (LOPRESSOR) tablet 25 mg BID    ondansetron injection 4 mg Q6H PRN    pantoprazole suspension 40 mg Daily    psyllium husk (aspartame) 3.4 gram packet 1 packet Daily    sodium chloride 0.9% flush 10 mL Q12H PRN    zinc sulfate capsule 220 mg Daily       Objective:     Vital Signs (Most Recent):  Temp: 98.7 °F (37.1 °C) (05/18/24 1133)  Pulse: 95 (05/18/24 1133)  Resp: 19 (05/18/24 1133)  BP: 127/78 (05/18/24 1133)  SpO2: 100 % (05/18/24 1133) Vital Signs (24h Range):  Temp:  [98.4 °F (36.9 °C)-98.7 °F (37.1 °C)] 98.7 °F (37.1 °C)  Pulse:  [] 95  Resp:  [17-19] 19  SpO2:  [98 %-100 %] 100 %  BP: (109-127)/(63-85) 127/78     Weight: 105.5 kg (232 lb 9.6 oz) (05/16/24 1121)  Body mass index is 36.43 kg/m².  Body surface area is 2.23 meters squared.    I/O last 3 completed shifts:  In: 2057 [I.V.:250; Other:300; NG/GT:1507]  Out: 2094 [Other:2094]     Physical Exam  Vitals and nursing note reviewed.   HENT:      Head: Normocephalic and atraumatic.   Neck:      Comments: Trach  "removed  Cardiovascular:      Rate and Rhythm: Normal rate and regular rhythm.      Heart sounds: Normal heart sounds.   Pulmonary:      Effort: Pulmonary effort is normal. No respiratory distress.      Breath sounds: Normal breath sounds.   Abdominal:      General: Abdomen is flat. There is no distension.      Palpations: Abdomen is soft.      Tenderness: There is no abdominal tenderness.      Comments: PEG tube   Musculoskeletal:      Right lower leg: No edema.      Left lower leg: No edema.      Comments: Moves UEs spontaneously   Skin:     General: Skin is warm and dry.   Neurological:      General: No focal deficit present.      Mental Status: She is alert.      Comments: Nonverbal        Significant Labs:  CBC: No results for input(s): "WBC", "RBC", "HGB", "HCT", "PLT", "MCV", "MCH", "MCHC" in the last 168 hours.  CMP:   Recent Labs   Lab 05/17/24  0730   *   CALCIUM 10.0   ALBUMIN 3.0*   *   K 4.2   CO2 21*   CL 92*   BUN 53*   CREATININE 5.0*     All labs within the past 24 hours have been reviewed.       "

## 2024-05-18 NOTE — ASSESSMENT & PLAN NOTE
Creatine stable for now. BMP reviewed- noted Estimated Creatinine Clearance: 16.3 mL/min (A) (based on SCr of 5 mg/dL (H)). according to latest data. Based on current GFR, CKD stage is end stage.  Monitor UOP and serial BMP and adjust therapy as needed. Renally dose meds. Avoid nephrotoxic medications and procedures.    -- HD MWF  -- nephro following

## 2024-05-19 LAB
OHS QRS DURATION: 154 MS
OHS QTC CALCULATION: 484 MS
POCT GLUCOSE: 127 MG/DL (ref 70–110)
POCT GLUCOSE: 145 MG/DL (ref 70–110)
POCT GLUCOSE: 146 MG/DL (ref 70–110)
POCT GLUCOSE: 160 MG/DL (ref 70–110)
POCT GLUCOSE: 165 MG/DL (ref 70–110)
POCT GLUCOSE: 169 MG/DL (ref 70–110)

## 2024-05-19 PROCEDURE — 27000207 HC ISOLATION

## 2024-05-19 PROCEDURE — 25000003 PHARM REV CODE 250

## 2024-05-19 PROCEDURE — 63600175 PHARM REV CODE 636 W HCPCS

## 2024-05-19 PROCEDURE — 25000242 PHARM REV CODE 250 ALT 637 W/ HCPCS

## 2024-05-19 PROCEDURE — 20600001 HC STEP DOWN PRIVATE ROOM

## 2024-05-19 RX ADMIN — ATORVASTATIN CALCIUM 40 MG: 40 TABLET, FILM COATED ORAL at 08:05

## 2024-05-19 RX ADMIN — PSYLLIUM HUSK 1 PACKET: 3.4 POWDER ORAL at 09:05

## 2024-05-19 RX ADMIN — HEPARIN SODIUM 7500 UNITS: 5000 INJECTION INTRAVENOUS; SUBCUTANEOUS at 02:05

## 2024-05-19 RX ADMIN — INSULIN ASPART 4 UNITS: 100 INJECTION, SOLUTION INTRAVENOUS; SUBCUTANEOUS at 05:05

## 2024-05-19 RX ADMIN — HEPARIN SODIUM 7500 UNITS: 5000 INJECTION INTRAVENOUS; SUBCUTANEOUS at 09:05

## 2024-05-19 RX ADMIN — METOPROLOL TARTRATE 25 MG: 25 TABLET, FILM COATED ORAL at 08:05

## 2024-05-19 RX ADMIN — Medication 500 MG: at 08:05

## 2024-05-19 RX ADMIN — METOPROLOL TARTRATE 25 MG: 25 TABLET, FILM COATED ORAL at 09:05

## 2024-05-19 RX ADMIN — PANTOPRAZOLE SODIUM 40 MG: 40 GRANULE, DELAYED RELEASE ORAL at 08:05

## 2024-05-19 RX ADMIN — INSULIN ASPART 4 UNITS: 100 INJECTION, SOLUTION INTRAVENOUS; SUBCUTANEOUS at 12:05

## 2024-05-19 RX ADMIN — Medication 500 MG: at 09:05

## 2024-05-19 RX ADMIN — INSULIN DETEMIR 27 UNITS: 100 INJECTION, SOLUTION SUBCUTANEOUS at 09:05

## 2024-05-19 RX ADMIN — INSULIN DETEMIR 27 UNITS: 100 INJECTION, SOLUTION SUBCUTANEOUS at 08:05

## 2024-05-19 RX ADMIN — ASPIRIN 81 MG CHEWABLE TABLET 81 MG: 81 TABLET CHEWABLE at 08:05

## 2024-05-19 RX ADMIN — HEPARIN SODIUM 7500 UNITS: 5000 INJECTION INTRAVENOUS; SUBCUTANEOUS at 05:05

## 2024-05-19 RX ADMIN — ZINC SULFATE 220 MG (50 MG) CAPSULE 220 MG: CAPSULE at 08:05

## 2024-05-19 NOTE — NURSING
Nurses Note -- 4 Eyes      5/19/2024   12:45 AM      Skin assessed during: Daily Assessment      [x] No Altered Skin Integrity Present    [x]Prevention Measures Documented      [] Yes- Altered Skin Integrity Present or Discovered   [] LDA Added if Not in Epic (Describe Wound)   [] New Altered Skin Integrity was Present on Admit and Documented in LDA   [] Wound Image Taken    Wound Care Consulted? No    Attending Nurse:  Charisma Eastman RN/Staff Member:  Leann         Patient had no new altered skin integrity during daily assessment.

## 2024-05-19 NOTE — PLAN OF CARE
Problem: Infection  Goal: Absence of Infection Signs and Symptoms  Outcome: Progressing     Problem: Skin Injury Risk Increased  Goal: Skin Health and Integrity  Outcome: Progressing     Problem: Adult Inpatient Plan of Care  Goal: Plan of Care Review  Outcome: Progressing  Goal: Patient-Specific Goal (Individualized)  Outcome: Progressing  Goal: Absence of Hospital-Acquired Illness or Injury  Outcome: Progressing  Goal: Optimal Comfort and Wellbeing  Outcome: Progressing  Goal: Readiness for Transition of Care  Outcome: Progressing     Problem: Bariatric Environmental Safety  Goal: Safety Maintained with Care  Outcome: Progressing     Problem: Device-Related Complication Risk (Hemodialysis)  Goal: Safe, Effective Therapy Delivery  Outcome: Progressing     Problem: Hemodynamic Instability (Hemodialysis)  Goal: Effective Tissue Perfusion  Outcome: Progressing     Problem: Infection (Hemodialysis)  Goal: Absence of Infection Signs and Symptoms  Outcome: Progressing     Problem: Diabetes Comorbidity  Goal: Blood Glucose Level Within Targeted Range  Outcome: Progressing     Problem: Adjustment to Illness (Sepsis/Septic Shock)  Goal: Optimal Coping  Outcome: Progressing     Problem: Glycemic Control Impaired (Sepsis/Septic Shock)  Goal: Blood Glucose Level Within Desired Range  Outcome: Progressing     Problem: Infection Progression (Sepsis/Septic Shock)  Goal: Absence of Infection Signs and Symptoms  Outcome: Progressing     Problem: Nutrition Impaired (Sepsis/Septic Shock)  Goal: Optimal Nutrition Intake  Outcome: Progressing     Problem: Fall Injury Risk  Goal: Absence of Fall and Fall-Related Injury  Outcome: Progressing     Problem: Adjustment to Illness (Chronic Kidney Disease)  Goal: Optimal Coping with Chronic Illness  Outcome: Progressing  Goal: Electrolyte Balance  Outcome: Progressing  Goal: Fluid Balance  Outcome: Progressing     Problem: Electrolyte Imbalance (Chronic Kidney Disease)  Goal: Electrolyte  Balance  Outcome: Progressing     Problem: Fluid Volume Excess (Chronic Kidney Disease)  Goal: Fluid Balance  Outcome: Progressing     Problem: Functional Decline (Chronic Kidney Disease)  Goal: Optimal Functional Ability  Outcome: Progressing     Problem: Hematologic Alteration (Chronic Kidney Disease)  Goal: Absence of Anemia Signs and Symptoms  Outcome: Progressing     Problem: Oral Intake Inadequate (Chronic Kidney Disease)  Goal: Optimal Oral Intake  Outcome: Progressing     Problem: Pain (Chronic Kidney Disease)  Goal: Acceptable Pain Control  Outcome: Progressing     Problem: Renal Function Impairment (Chronic Kidney Disease)  Goal: Minimize Renal Failure Effects  Outcome: Progressing     Problem: Restraint, Nonviolent  Goal: Absence of Harm or Injury  Outcome: Progressing     Problem: Wound  Goal: Optimal Coping  Outcome: Progressing  Goal: Optimal Functional Ability  Outcome: Progressing  Goal: Absence of Infection Signs and Symptoms  Outcome: Progressing  Goal: Improved Oral Intake  Outcome: Progressing  Goal: Optimal Pain Control and Function  Outcome: Progressing  Goal: Skin Health and Integrity  Outcome: Progressing  Goal: Optimal Wound Healing  Outcome: Progressing     Patient remains free from falls and injury. NADN. VSS. Safety maintained; bed low and locked, call light in reach.  No complaint of pain, n/v, diarrhea, or SOB. Questions encouraged and answered. Plan of care reviewed with patient. Will continue to monitor, will continue with plan of care.

## 2024-05-19 NOTE — SUBJECTIVE & OBJECTIVE
Interval History: Removed IV access overnight. Pending placement      Objective:     Vital Signs (Most Recent):  Temp: 98.1 °F (36.7 °C) (05/19/24 0753)  Pulse: 93 (05/19/24 0753)  Resp: 18 (05/19/24 0753)  BP: 120/78 (05/19/24 0753)  SpO2: 99 % (05/19/24 0753) Vital Signs (24h Range):  Temp:  [98.1 °F (36.7 °C)-99.3 °F (37.4 °C)] 98.1 °F (36.7 °C)  Pulse:  [93-98] 93  Resp:  [18-19] 18  SpO2:  [98 %-100 %] 99 %  BP: (120-137)/(68-85) 120/78     Weight: 105.5 kg (232 lb 9.6 oz)  Body mass index is 36.43 kg/m².    Intake/Output Summary (Last 24 hours) at 5/19/2024 0808  Last data filed at 5/19/2024 0630  Gross per 24 hour   Intake 1480 ml   Output 0 ml   Net 1480 ml         Physical Exam  Vitals and nursing note reviewed.   HENT:      Head: Normocephalic and atraumatic.   Neck:      Comments: Trach removed  Cardiovascular:      Rate and Rhythm: Normal rate and regular rhythm.      Heart sounds: Normal heart sounds.   Pulmonary:      Effort: Pulmonary effort is normal. No respiratory distress.      Breath sounds: Normal breath sounds.   Abdominal:      General: Abdomen is flat. There is no distension.      Palpations: Abdomen is soft.      Tenderness: There is no abdominal tenderness.      Comments: PEG tube   Musculoskeletal:      Right lower leg: No edema.      Left lower leg: No edema.      Comments: Moves UEs spontaneously   Skin:     General: Skin is warm and dry.   Neurological:      General: No focal deficit present.      Mental Status: She is alert.      Comments: Nonverbal, not following commands             Significant Labs: All pertinent labs within the past 24 hours have been reviewed.    Significant Imaging: I have reviewed all pertinent imaging results/findings within the past 24 hours.

## 2024-05-19 NOTE — NURSING
Report received from ELISABETH Zafar. Patient remains free from falls and injury. NADN. VSS. Questions encouraged and answered. Patient verbalized understanding. Bed locked and in low position; safety maintained. Call light in reach. White board updated, and explained. No complaint of pain, n/v, diarrhea, or SOB.  Will continue to monitor, will continue with plan of care.

## 2024-05-19 NOTE — PLAN OF CARE
Problem: Infection  Goal: Absence of Infection Signs and Symptoms  Outcome: Progressing     Problem: Skin Injury Risk Increased  Goal: Skin Health and Integrity  Outcome: Progressing     Problem: Adult Inpatient Plan of Care  Goal: Plan of Care Review  Outcome: Progressing  Goal: Patient-Specific Goal (Individualized)  Outcome: Progressing  Goal: Absence of Hospital-Acquired Illness or Injury  Outcome: Progressing  Goal: Optimal Comfort and Wellbeing  Outcome: Progressing  Goal: Readiness for Transition of Care  Outcome: Progressing     Problem: Bariatric Environmental Safety  Goal: Safety Maintained with Care  Outcome: Progressing     Problem: Device-Related Complication Risk (Hemodialysis)  Goal: Safe, Effective Therapy Delivery  Outcome: Progressing     Problem: Hemodynamic Instability (Hemodialysis)  Goal: Effective Tissue Perfusion  Outcome: Progressing     Problem: Infection (Hemodialysis)  Goal: Absence of Infection Signs and Symptoms  Outcome: Progressing     Problem: Adjustment to Illness (Sepsis/Septic Shock)  Goal: Optimal Coping  Outcome: Progressing     Problem: Infection Progression (Sepsis/Septic Shock)  Goal: Absence of Infection Signs and Symptoms  Outcome: Progressing     Problem: Nutrition Impaired (Sepsis/Septic Shock)  Goal: Optimal Nutrition Intake  Outcome: Progressing     Problem: Fall Injury Risk  Goal: Absence of Fall and Fall-Related Injury  Outcome: Progressing     Problem: Adjustment to Illness (Chronic Kidney Disease)  Goal: Optimal Coping with Chronic Illness  Outcome: Progressing  Goal: Electrolyte Balance  Outcome: Progressing  Goal: Fluid Balance  Outcome: Progressing     Problem: Electrolyte Imbalance (Chronic Kidney Disease)  Goal: Electrolyte Balance  Outcome: Progressing     Problem: Fluid Volume Excess (Chronic Kidney Disease)  Goal: Fluid Balance  Outcome: Progressing     Problem: Functional Decline (Chronic Kidney Disease)  Goal: Optimal Functional Ability  Outcome:  Progressing     Problem: Hematologic Alteration (Chronic Kidney Disease)  Goal: Absence of Anemia Signs and Symptoms  Outcome: Progressing     Problem: Oral Intake Inadequate (Chronic Kidney Disease)  Goal: Optimal Oral Intake  Outcome: Progressing     Problem: Pain (Chronic Kidney Disease)  Goal: Acceptable Pain Control  Outcome: Progressing     Problem: Renal Function Impairment (Chronic Kidney Disease)  Goal: Minimize Renal Failure Effects  Outcome: Progressing     Problem: Restraint, Nonviolent  Goal: Absence of Harm or Injury  Outcome: Progressing     Problem: Wound  Goal: Optimal Coping  Outcome: Progressing  Goal: Absence of Infection Signs and Symptoms  Outcome: Progressing  Goal: Optimal Pain Control and Function  Outcome: Progressing  Goal: Skin Health and Integrity  Outcome: Progressing  Goal: Optimal Wound Healing  Outcome: Progressing     Problem: Restraint, Nonviolent  Goal: Absence of Harm or Injury  Outcome: Progressing     Problem: Diabetes Comorbidity  Goal: Blood Glucose Level Within Targeted Range  Outcome: Not Progressing     Problem: Glycemic Control Impaired (Sepsis/Septic Shock)  Goal: Blood Glucose Level Within Desired Range  Outcome: Not Progressing     Problem: Wound  Goal: Optimal Functional Ability  Outcome: Not Progressing  Goal: Improved Oral Intake  Outcome: Not Progressing

## 2024-05-19 NOTE — ASSESSMENT & PLAN NOTE
Patient's FSGs are controlled on current medication regimen.  Last A1c reviewed-   Lab Results   Component Value Date    HGBA1C 10.4 (H) 01/30/2024     Most recent fingerstick glucose reviewed-   Recent Labs   Lab 05/18/24  2131 05/19/24  0031 05/19/24  0529 05/19/24  0752   POCTGLUCOSE 166* 169* 145* 165*       Current correctional scale  Medium  Maintain anti-hyperglycemic dose as follows-   Antihyperglycemics (From admission, onward)    Start     Stop Route Frequency Ordered    04/17/24 1200  insulin aspart U-100 pen 4 Units         -- SubQ Every 6 hours 04/17/24 0938    04/13/24 2100  insulin detemir U-100 (Levemir) pen 27 Units         -- SubQ 2 times daily 04/13/24 0931        Hold Oral hypoglycemics while patient is in the hospital.  POCT glucose q6h

## 2024-05-19 NOTE — PROGRESS NOTES
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 History was given from patient's daughter. She was undergoing dialysis today and noticed she was lethargic, less alert than usual self. Pt completed dialysis and still not acting herself. EMS was called, fever of a 100.  On chart review, she did have an episode of large volume emesis around 1700.  Per EMS, she had a slightly elevated temp 100.0°F, glucose 300s.     In the ED: UA 2+ leuks, >100 WBC, many bacteria, WBC 17, CT abd/pelvis concerning for cystitis. Given vanc/zosyn    Overview/Hospital Course:  Patient was admitted to Hospital Medicine service for medical management and evaluation of urosepsis. Patient was continued on vanc/zosyn. Vascular Neurology consulted concerning mental status change with the recommendation to initiate ASA 81 QD and to obtain an MRI to r/o new stroke. Imaging pending. Nephrology was consulted for regularly scheduled dialysis. Afternoon of 4/12, patient was unable to tolerate filtration of volume during dialsis 2/2 hypotension. Patient received 500cc bolus and was transferred back to floor after finishing the session without volume removed. Will monitor for signs of volume overload. Tailoring insulin regimen while uptitrating tube feeds to goal rate. Staph Epi in all 4 bottles; ID with recommendation to continue Vanc & de-escalate meropenem to ertapenem on 04/15 through 4/16. Patient completed IV course of UTI coverage. Patient tolerated volume removal well in dialysis  throughout the rest of her hospital stay. Pending discharge to LTACH pending bed availability. Patient without BM with one episode of vomiting overnight 4/17. KUB with bowel gas and low concern for obstruction with no transition point noted. Escalating bowel regimen. Pending placement as of 4/22. Pulm consult placed to evaluate for possible trach downsize & eventual decannulation to assist placement if safe. Trach capping trial per pulm for 48h and will assess for decannulation on Monday. DVT studies negative. Pulm successfully decannulated pt 4/30, IR exchanged TDC. Pending swallow study with SLP and waiting for dispo options. Pt failed swallow study, placement pending. Working with PT on mobilize pt to sitting in a chair to expand placement options. Pt successfully mobilized using sandee lift but required 2 people to do so. Discussing dispo plan with family, likely d/c home if HD, DME needs able to be met. Pending acceptance at outpatient HD center    Interval History: Removed IV access overnight. Pending placement      Objective:     Vital Signs (Most Recent):  Temp: 98.1 °F (36.7 °C) (05/19/24 0753)  Pulse: 93 (05/19/24 0753)  Resp: 18 (05/19/24 0753)  BP: 120/78 (05/19/24 0753)  SpO2: 99 % (05/19/24 0753) Vital Signs (24h Range):  Temp:  [98.1 °F (36.7 °C)-99.3 °F (37.4 °C)] 98.1 °F (36.7 °C)  Pulse:  [93-98] 93  Resp:  [18-19] 18  SpO2:  [98 %-100 %] 99 %  BP: (120-137)/(68-85) 120/78     Weight: 105.5 kg (232 lb 9.6 oz)  Body mass index is 36.43 kg/m².    Intake/Output Summary (Last 24 hours) at 5/19/2024 0808  Last data filed at 5/19/2024 0630  Gross per 24 hour   Intake 1480 ml   Output 0 ml   Net 1480 ml         Physical Exam  Vitals and nursing note reviewed.   HENT:      Head: Normocephalic and atraumatic.   Neck:      Comments: Trach removed  Cardiovascular:      Rate and Rhythm: Normal rate and regular rhythm.      Heart sounds: Normal heart sounds.   Pulmonary:      Effort: Pulmonary effort is normal.  No respiratory distress.      Breath sounds: Normal breath sounds.   Abdominal:      General: Abdomen is flat. There is no distension.      Palpations: Abdomen is soft.      Tenderness: There is no abdominal tenderness.      Comments: PEG tube   Musculoskeletal:      Right lower leg: No edema.      Left lower leg: No edema.      Comments: Moves UEs spontaneously   Skin:     General: Skin is warm and dry.   Neurological:      General: No focal deficit present.      Mental Status: She is alert.      Comments: Nonverbal, not following commands             Significant Labs: All pertinent labs within the past 24 hours have been reviewed.    Significant Imaging: I have reviewed all pertinent imaging results/findings within the past 24 hours.    Assessment/Plan:      * Acute cystitis with hematuria  resolved    Pt presenting with AMS and worsening lethargy. Pt is non-verbal at baseline, does not follow commands, but was less responsive than normal. Pt found to have a fever. Urinary source suspected based off of urine and CT abd/pelvis. Pt has many prior resistant bacterial infections including urinary sources. Has prior with sensitivity to zosyn and has also improved off ED dose of vanc/zosyn. Lactic elevated a 3.8->3.49 prior to completion of fluid bolus 500ml.    Patient with new fever and tachycardia on 4/25. Low threshold to initiate additional infectious workup and repeat UA.     Plan  S/p course of vanc/ertapenem per ID. Course completed 4/16.  Daily cbc  Contact precautions    *on contact precautions indefinitely while patient still makes urine given pt incontinent per infection control    Debility  Needs:  Wheelchair: patient has a mobility limitation that significantly impairs her ability to participate in one or more mobility related activities of daily living (MRADL's) such as toileting, feeding, dressing, grooming, and bathing in customary locations in the home.  The mobility limitation cannot be sufficiently  resolved by the use of a cane or walker.   The use of a manual wheelchair will significantly improve the patient's ability to participate in MRADLS and the patient will use it on regular basis in the home.  Family/pt has expressed her willingness to use a manual wheelchair in the home.  She also has a caregiver who is capable of assisting in mobility.    Mrs Saravia requires a hospital bed due to her requiring positioning of the body in ways not feasible with an ordinary bed to alleviate pain/ is completely immobile /or limited mobility and cannot independently make changes in body position without the use of the bed.  The positioning of the body cannot be sufficiently resolved by the use of pillows and wedges    Patient has a mobility limitation that significantly impairs their ability to participate in one or more mobility related activities of daily living, including toileting. This deficit can be resolved by using a bedside commode. Patient demonstrates mobility limitations that will cause them to be confined to one room at home without bathroom access for up to 30 days. Using a bedside commode will greatly improve the patient's ability to participate in MRADLs       Complication of vascular dialysis catheter  Cath intermittently clogging, not resolving with cath-hedy, going for IR venogram 4/30 with possible exchange. S/p exchange with IR on 4/30      Constipation  RESOLVED with Bowel regimen  Patient without BM x5d. One episode of vomitus night of 4/17. Some concern for bowel obstruction. Tolerating continuous tube feeds at goal rate with 0cc on residuals. Physical exam with bowel sounds, soft nontender abdomen. KUB without concern for obstruction with bowel gas, lack of transition point.    Patient now with regular bowel movements on bowel regimen.     Plan  - Miralax BID, Senna BID  - compazine PRN    Moderate malnutrition  Nutrition consulted. Most recent weight and BMI monitored-     Measurements:  Wt  Readings from Last 1 Encounters:   05/16/24 105.5 kg (232 lb 9.6 oz)   Body mass index is 36.43 kg/m².    Patient has been screened and assessed by RD.    Malnutrition Type:  Context: acute illness or injury  Level: moderate    Malnutrition Characteristic Summary:  Weight Loss (Malnutrition): 10% in 6 months  Subcutaneous Fat (Malnutrition): mild depletion  Muscle Mass (Malnutrition): mild depletion  Fluid Accumulation (Malnutrition): mild    Interventions/Recommendations (treatment strategy):  1.    -- TF  -- going for swallow study with SLP to assess possibility of pleasure oral feedings --> failed repeatedly    Prolonged QT interval  Qtc 526 [04/10/24]      limit Qtc prolonging drugs as able    Spastic hemiplegia of right dominant side as late effect of cerebrovascular disease  Important that patient is able to sit in chair for 4h for dialysis placement needs, even if she requires lift/assistance getting into the chair     This patient has Chronic right hemiplegia due to stroke. Physical therapy services has been scheduled. Continue all standard measures for pressure injury prevention and consult wound care for any wounds (chronic or acute).    ESRD (end stage renal disease) on dialysis  Creatine stable for now. BMP reviewed- noted Estimated Creatinine Clearance: 16.3 mL/min (A) (based on SCr of 5 mg/dL (H)). according to latest data. Based on current GFR, CKD stage is end stage.  Monitor UOP and serial BMP and adjust therapy as needed. Renally dose meds. Avoid nephrotoxic medications and procedures.    -- HD MWF  -- nephro following    Status post tracheostomy  Resolved, decannulated 4/30    Acute encephalopathy  Pt is non-verbal and does not follow commands at baseline. Vascular Neurology consulted given acute change from baseline. MRI with concern for evolution of prior strokes with noted differential of T2 hyperintensities including vasculitis vs demyelination. Discussed with Vascular Neurology; low concern  "for ongoing vasculitis/demyelination, likely represents typical evolution of prior infarcts.    Patient at baseline as of 4/22.     Plan  - STAT head imaging for concern of new change in mental status/focal deficit    Type 2 diabetes mellitus with hyperglycemia, with long-term current use of insulin  Patient's FSGs are controlled on current medication regimen.  Last A1c reviewed-   Lab Results   Component Value Date    HGBA1C 10.4 (H) 01/30/2024     Most recent fingerstick glucose reviewed-   Recent Labs   Lab 05/18/24  2131 05/19/24  0031 05/19/24  0529 05/19/24  0752   POCTGLUCOSE 166* 169* 145* 165*       Current correctional scale  Medium  Maintain anti-hyperglycemic dose as follows-   Antihyperglycemics (From admission, onward)      Start     Stop Route Frequency Ordered    04/17/24 1200  insulin aspart U-100 pen 4 Units         -- SubQ Every 6 hours 04/17/24 0938    04/13/24 2100  insulin detemir U-100 (Levemir) pen 27 Units         -- SubQ 2 times daily 04/13/24 0931          Hold Oral hypoglycemics while patient is in the hospital.  POCT glucose q6h    Hyponatremia  Patient has hyponatremia which is uncontrolled,We will aim to correct the sodium by 4-6mEq in 24 hours. We will monitor sodium Daily. The hyponatremia is due to ESRD. Discussed with nephrology, dialysate bath adjusted to account for and correct hyponatremia.  No results for input(s): "NA" in the last 24 hours.      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 Pt underwent extensive course, currently still healing from this, but no longer has wound vac. Pt's wound currently covered with bandage. Picture in media tabs    Plan  Wound care        VTE Risk Mitigation (From admission, onward)           Ordered     heparin (porcine) injection 1,000 Units  As needed (PRN)         05/14/24 1138     heparin (porcine) injection 3,000 Units  As needed (PRN)         04/17/24 0825     heparin (porcine) injection " 7,500 Units  Every 8 hours         04/11/24 0252                    Discharge Planning   ZEKE: 5/17/2024     Code Status: Full Code   Is the patient medically ready for discharge?: No    Reason for patient still in hospital (select all that apply): Patient trending condition  Discharge Plan A: New Nursing Home placement - [facility] care facility                  Osito Dos Santos MD  Department of Hospital Medicine   [facility] - Telemetry Stepdown

## 2024-05-20 LAB
ALBUMIN SERPL BCP-MCNC: 3 G/DL (ref 3.5–5.2)
ANION GAP SERPL CALC-SCNC: 12 MMOL/L (ref 8–16)
BUN SERPL-MCNC: 63 MG/DL (ref 6–20)
CALCIUM SERPL-MCNC: 10.6 MG/DL (ref 8.7–10.5)
CHLORIDE SERPL-SCNC: 93 MMOL/L (ref 95–110)
CO2 SERPL-SCNC: 20 MMOL/L (ref 23–29)
CREAT SERPL-MCNC: 6.3 MG/DL (ref 0.5–1.4)
EST. GFR  (NO RACE VARIABLE): 7.4 ML/MIN/1.73 M^2
GLUCOSE SERPL-MCNC: 154 MG/DL (ref 70–110)
PHOSPHATE SERPL-MCNC: 4.2 MG/DL (ref 2.7–4.5)
POCT GLUCOSE: 142 MG/DL (ref 70–110)
POCT GLUCOSE: 144 MG/DL (ref 70–110)
POCT GLUCOSE: 144 MG/DL (ref 70–110)
POCT GLUCOSE: 153 MG/DL (ref 70–110)
POCT GLUCOSE: 156 MG/DL (ref 70–110)
POCT GLUCOSE: 166 MG/DL (ref 70–110)
POTASSIUM SERPL-SCNC: 4.4 MMOL/L (ref 3.5–5.1)
SODIUM SERPL-SCNC: 125 MMOL/L (ref 136–145)

## 2024-05-20 PROCEDURE — 63600175 PHARM REV CODE 636 W HCPCS

## 2024-05-20 PROCEDURE — 97535 SELF CARE MNGMENT TRAINING: CPT

## 2024-05-20 PROCEDURE — 97530 THERAPEUTIC ACTIVITIES: CPT

## 2024-05-20 PROCEDURE — 20600001 HC STEP DOWN PRIVATE ROOM

## 2024-05-20 PROCEDURE — 36415 COLL VENOUS BLD VENIPUNCTURE: CPT

## 2024-05-20 PROCEDURE — 25000003 PHARM REV CODE 250

## 2024-05-20 PROCEDURE — 25000242 PHARM REV CODE 250 ALT 637 W/ HCPCS

## 2024-05-20 PROCEDURE — 25000003 PHARM REV CODE 250: Performed by: NURSE PRACTITIONER

## 2024-05-20 PROCEDURE — 63600175 PHARM REV CODE 636 W HCPCS: Performed by: NURSE PRACTITIONER

## 2024-05-20 PROCEDURE — 90935 HEMODIALYSIS ONE EVALUATION: CPT | Mod: ,,, | Performed by: INTERNAL MEDICINE

## 2024-05-20 PROCEDURE — 80100014 HC HEMODIALYSIS 1:1

## 2024-05-20 PROCEDURE — 27000207 HC ISOLATION

## 2024-05-20 PROCEDURE — 80069 RENAL FUNCTION PANEL: CPT

## 2024-05-20 PROCEDURE — 63600175 PHARM REV CODE 636 W HCPCS: Performed by: INTERNAL MEDICINE

## 2024-05-20 RX ORDER — ONDANSETRON HYDROCHLORIDE 4 MG/5ML
4 SOLUTION ORAL EVERY 6 HOURS PRN
Status: DISCONTINUED | OUTPATIENT
Start: 2024-05-20 | End: 2024-07-08 | Stop reason: HOSPADM

## 2024-05-20 RX ORDER — ONDANSETRON 4 MG/1
4 TABLET, ORALLY DISINTEGRATING ORAL EVERY 6 HOURS PRN
Status: DISCONTINUED | OUTPATIENT
Start: 2024-05-20 | End: 2024-05-20

## 2024-05-20 RX ORDER — SODIUM CHLORIDE 9 MG/ML
INJECTION, SOLUTION INTRAVENOUS ONCE
Status: COMPLETED | OUTPATIENT
Start: 2024-05-20 | End: 2024-05-20

## 2024-05-20 RX ADMIN — METOPROLOL TARTRATE 25 MG: 25 TABLET, FILM COATED ORAL at 09:05

## 2024-05-20 RX ADMIN — INSULIN DETEMIR 27 UNITS: 100 INJECTION, SOLUTION SUBCUTANEOUS at 09:05

## 2024-05-20 RX ADMIN — ASPIRIN 81 MG CHEWABLE TABLET 81 MG: 81 TABLET CHEWABLE at 12:05

## 2024-05-20 RX ADMIN — HEPARIN SODIUM 7500 UNITS: 5000 INJECTION INTRAVENOUS; SUBCUTANEOUS at 09:05

## 2024-05-20 RX ADMIN — HEPARIN SODIUM 1000 UNITS: 1000 INJECTION, SOLUTION INTRAVENOUS; SUBCUTANEOUS at 11:05

## 2024-05-20 RX ADMIN — INSULIN ASPART 4 UNITS: 100 INJECTION, SOLUTION INTRAVENOUS; SUBCUTANEOUS at 12:05

## 2024-05-20 RX ADMIN — SODIUM CHLORIDE: 9 INJECTION, SOLUTION INTRAVENOUS at 08:05

## 2024-05-20 RX ADMIN — PANTOPRAZOLE SODIUM 40 MG: 40 GRANULE, DELAYED RELEASE ORAL at 12:05

## 2024-05-20 RX ADMIN — HEPARIN SODIUM 7500 UNITS: 5000 INJECTION INTRAVENOUS; SUBCUTANEOUS at 01:05

## 2024-05-20 RX ADMIN — METOPROLOL TARTRATE 25 MG: 25 TABLET, FILM COATED ORAL at 12:05

## 2024-05-20 RX ADMIN — Medication 500 MG: at 09:05

## 2024-05-20 RX ADMIN — INSULIN ASPART 4 UNITS: 100 INJECTION, SOLUTION INTRAVENOUS; SUBCUTANEOUS at 06:05

## 2024-05-20 RX ADMIN — INSULIN DETEMIR 27 UNITS: 100 INJECTION, SOLUTION SUBCUTANEOUS at 12:05

## 2024-05-20 RX ADMIN — ZINC SULFATE 220 MG (50 MG) CAPSULE 220 MG: CAPSULE at 12:05

## 2024-05-20 RX ADMIN — Medication 500 MG: at 12:05

## 2024-05-20 RX ADMIN — PSYLLIUM HUSK 1 PACKET: 3.4 POWDER ORAL at 12:05

## 2024-05-20 RX ADMIN — ATORVASTATIN CALCIUM 40 MG: 40 TABLET, FILM COATED ORAL at 12:05

## 2024-05-20 RX ADMIN — HEPARIN SODIUM 7500 UNITS: 5000 INJECTION INTRAVENOUS; SUBCUTANEOUS at 06:05

## 2024-05-20 RX ADMIN — HEPARIN SODIUM 3000 UNITS: 1000 INJECTION, SOLUTION INTRAVENOUS; SUBCUTANEOUS at 08:05

## 2024-05-20 NOTE — ASSESSMENT & PLAN NOTE
Patient has hyponatremia which is uncontrolled,We will aim to correct the sodium by 4-6mEq in 24 hours. We will monitor sodium Daily. The hyponatremia is due to ESRD. Discussed with nephrology, dialysate bath adjusted to account for and correct hyponatremia.  Recent Labs   Lab 05/20/24  0643   *

## 2024-05-20 NOTE — SUBJECTIVE & OBJECTIVE
Interval History: NAEON . Pending placement      Objective:     Vital Signs (Most Recent):  Temp: 97.5 °F (36.4 °C) (05/20/24 0815)  Pulse: 98 (05/20/24 1015)  Resp: 17 (05/20/24 0815)  BP: 121/80 (05/20/24 1015)  SpO2: 97 % (05/20/24 0815) Vital Signs (24h Range):  Temp:  [97.5 °F (36.4 °C)-99 °F (37.2 °C)] 97.5 °F (36.4 °C)  Pulse:  [] 98  Resp:  [17-18] 17  SpO2:  [97 %-100 %] 97 %  BP: (106-142)/(65-89) 121/80     Weight: 105.5 kg (232 lb 9.6 oz)  Body mass index is 36.43 kg/m².  No intake or output data in the 24 hours ending 05/20/24 1055      Physical Exam  Vitals and nursing note reviewed.   HENT:      Head: Normocephalic and atraumatic.   Neck:      Comments: Trach removed  Cardiovascular:      Rate and Rhythm: Normal rate and regular rhythm.      Heart sounds: Normal heart sounds.   Pulmonary:      Effort: Pulmonary effort is normal. No respiratory distress.      Breath sounds: Normal breath sounds.   Abdominal:      General: Abdomen is flat. There is no distension.      Palpations: Abdomen is soft.      Tenderness: There is no abdominal tenderness.      Comments: PEG tube   Musculoskeletal:      Right lower leg: No edema.      Left lower leg: No edema.      Comments: Moves UEs spontaneously   Skin:     General: Skin is warm and dry.   Neurological:      General: No focal deficit present.      Mental Status: She is alert.      Comments: Nonverbal, not following commands             Significant Labs: All pertinent labs within the past 24 hours have been reviewed.    Significant Imaging: I have reviewed all pertinent imaging results/findings within the past 24 hours.

## 2024-05-20 NOTE — ASSESSMENT & PLAN NOTE
Creatine stable for now. BMP reviewed- noted Estimated Creatinine Clearance: 12.9 mL/min (A) (based on SCr of 6.3 mg/dL (H)). according to latest data. Based on current GFR, CKD stage is end stage.  Monitor UOP and serial BMP and adjust therapy as needed. Renally dose meds. Avoid nephrotoxic medications and procedures.    -- HD MWF  -- nephro following

## 2024-05-20 NOTE — PT/OT/SLP PROGRESS
Occupational Therapy   Treatment    Name: Sarah Saravia  MRN: [id number]  Admitting Diagnosis:  Acute cystitis with hematuria       Recommendations:     Discharge Recommendations: Moderate Intensity Therapy  Discharge Equipment Recommendations:  hospital bed, lift device, wheelchair  Barriers to discharge:       Assessment:     Sarah Saravia is a 53 y.o. female with a medical diagnosis of Acute cystitis with hematuria.  She presents with deficits in self-care tasks as well as mobility. Pt. Required extensive assist for all mobility. Pt. Was able to sit EOB with CGA/Min A. Pt. With coughing episode during session and vomited x 2 times; nurse came to assess pt. Pt.'s feeding tube was on HOLD entire OT session. Patient would benefit from continued OT services to maximize level of safety and independence with self-care tasks.   . Performance deficits affecting function are weakness, impaired endurance, impaired self care skills, impaired balance, impaired cognition.     Rehab Prognosis:  Good; patient would benefit from acute skilled OT services to address these deficits and reach maximum level of function.       Plan:     Patient to be seen 3 x/week to address the above listed problems via self-care/home management, therapeutic activities, therapeutic exercises, neuromuscular re-education  Plan of Care Expires: 06/13/24  Plan of Care Reviewed with: patient    Subjective     Chief Complaint: Pt. Grimaced with mobility of LLE to off bed for sitting  Patient/Family Comments/goals: no goals stated  Pain/Comfort:  Pain Rating 1:  (grimaced)  Location - Side 1: Left  Location 1: leg  Pain Addressed 1: Reposition, Distraction  Pain Rating Post-Intervention 1: 0/10    Objective:     Communicated with: nurse prior to session.  Patient found supine with PEG Tube (HD catherter) upon OT entry to room.Family initially present    General Precautions: Standard, aphasia, aspiration, NPO, fall    Orthopedic Precautions:N/A  Braces:  N/A  Respiratory Status: Room air     Occupational Performance:     Bed Mobility:    Patient completed Rolling/Turning to Left with  total assistance and 2 persons  Patient completed Rolling/Turning to Right with total assistance and 2 persons  Patient completed Scooting/Bridging with total assistance and 2 persons  Patient completed Supine to Sit with total assistance and 2 persons  Patient completed Sit to Supine with total assistance and 2 persons     Functional Mobility/Transfers:  Pt. Performed partial squat to clear buttocks from bed with max A x 2 x 2 trials.   Functional Mobility: not tested    Activities of Daily Living:  Upper Body Dressing: total assistance to change gown   Toileting: dependence for cleaning         Grooming: Min A seated EOB to wash face with use of LUE    AMPAC 6 Click ADL: 8    Treatment & Education:  Pt. Sat eob x ~ 15 minutes on this date with CGA/Min A  Pt. Alert entire session  Pt. Kicked left foot given verbal and tactile cues when seated EOB x 3/5 trials and right foot x 2 trials/5      Patient left supine with all lines intact, call button in reach, and nurse notified and feeding tube remained on HOLD    GOALS:   Multidisciplinary Problems       Occupational Therapy Goals          Problem: Occupational Therapy    Goal Priority Disciplines Outcome Interventions   Occupational Therapy Goal     OT, PT/OT Progressing    Description: Goals to be met by: 5/22/24     Patient will increase functional independence with ADLs by performing:    UE Dressing with Maximum Assistance.  Grooming while EOB with Maximum Assistance.  Sitting at edge of bed x5 minutes with Maximum Assistance.  Rolling to Bilateral with Moderate Assistance.   Supine to sit with Maximum Assistance.                         Time Tracking:     OT Date of Treatment: 05/20/24  OT Start Time: 1311  OT Stop Time: 1355  OT Total Time (min): 44 min    Billable Minutes:Self Care/Home Management 15  Therapeutic Activity  29    OT/JOSÉ: OT     Number of JOSÉ visits since last OT visit: 1    5/20/2024

## 2024-05-20 NOTE — PROGRESS NOTES
"NEPHROLOGY HEMODIALYSIS NOTE    Sarah Saravia is a 53 y.o. female currently on hemodialysis for removal of uremic toxins and volume management.     Patient seen and evaluated on hemodialysis, tolerating treatment, see HD flowsheet for vitals and assessments.    No Hypotension, chest pain, shortness of breath, cramping, nausea or vomiting.      Labs have been reviewed and the dialysate bath has been adjusted.    Labs:      Recent Labs   Lab 05/17/24  0704 05/17/24  0730 05/20/24  0643   * 127* 125*   K 3.9 4.2 4.4   CL 89* 92* 93*   CO2 20* 21* 20*   BUN 60* 53* 63*   CREATININE 5.7* 5.0* 6.3*   CALCIUM 10.5 10.0 10.6*   PHOS 3.9 3.3 4.2       No results for input(s): "WBC", "HGB", "HCT", "PLT" in the last 168 hours.       Assessment/Plan: ESRD  - Seen on dialysis this morning, tolerating session with current UFR, no complications.    - NA bath of 133 should help correct hyponatremia   - Will continue dialysis treatments while in-patient  - Continue to monitor intake and output   - Renally dose medications  - Pre/Post dialysis treatment weights  - Will continue to follow closely.      "

## 2024-05-20 NOTE — PT/OT/SLP PROGRESS
Speech Language Pathology      Sarah NADER Saravia  MRN: [id number]    Patient not seen today secondary to dialysis on 1st attempt and working with OT on 2nd attempt. Will follow-up per SLP POC.      5/20/2024

## 2024-05-20 NOTE — PLAN OF CARE
Met with patient's sister Milana to discuss discharge planning. This CM explained we have been unable to find an accepting HD facility at this time. We discussed going home but that we would still need to find a dialysis facility. This CM also revisited the possibility of Ms. Saravia going to [facility] as they would be able to provide dialysis. She expressed understanding but needs to talk with family . MD notified of above and requested they contact family as they have many questions.. Will continue to follow.    Sara Loredo RN  Ext [phone]

## 2024-05-20 NOTE — PROGRESS NOTES
"Please see previous notes from this SW for continuity.    __________________________________________________  SW received notification via [facility] online portal, pt denied from 2nd [facility] unit - [facility] Carmela. [facility] pt coordinator awaiting direction from [facility] leadership regarding pt's referral.    KARI requested update from [facility] on pt's dialysis referral. SW awaiting response.    SW to follow with updates.    UPDATE 9:09AM: SW received call from [facility] JAY Feliciano. Braden requesting additional records. Braden informed pt's referral was rerouted to [facility] as the [facility] units on the [facility] are either at capacity or denied the pt. KARI faxed requested records to [facility].    UPDATE 10:19AM: KARI also faxed pt's dialysis referral to [facility]. [facility] FA Delfina AYALA notified of pt's referral via secure chat.    Pt is currently pending medical and financial approval at [facility] and [facility]. Pt's referral is pending direction from leadership at [facility].    Inpt treatment team updated via secure chat.    UPDATE 3:06PM: KARI contacted [facility] online portal and [facility] for an update regarding pt's referral. KARI awaiting response.    Dafne Mccord, GAYLA  [facility]  [phone]      Your fax has been successfully sent to [phone] at [phone].  ------------------------------------------------------------  From: [id number]  ------------------------------------------------------------  5/20/2024 10:19:17 AM Transmission Record          Sent to [phone] with remote ID "[facility] Fax "          Result: (0/339;0/0) Success          Page record: 1 - 61          Elapsed time: 21:39 on channel 50    "

## 2024-05-20 NOTE — PROGRESS NOTES
HD TX completed.  TX time 3 hrs.  Net fluid removed-2 liters.  Saline flushed, hep locked and capped catheter.  Report given to primary nurse.

## 2024-05-20 NOTE — NURSING
.Nurses Note -- 4 Eyes      5/19/2024   8:07 PM      Skin assessed during: Q Shift Change      [] No Altered Skin Integrity Present    []Prevention Measures Documented      [x] Yes- Altered Skin Integrity Present or Discovered   [] LDA Added if Not in Epic (Describe Wound)   [] New Altered Skin Integrity was Present on Admit and Documented in LDA   [] Wound Image Taken    Wound Care Consulted? Yes    Attending Nurse:  Pk Eastman RN/Staff Member:  GONZALEZ Unger

## 2024-05-20 NOTE — PLAN OF CARE
Problem: Infection  Goal: Absence of Infection Signs and Symptoms  Outcome: Progressing     Problem: Skin Injury Risk Increased  Goal: Skin Health and Integrity  Outcome: Progressing     Problem: Adult Inpatient Plan of Care  Goal: Plan of Care Review  Outcome: Progressing  Goal: Patient-Specific Goal (Individualized)  Outcome: Progressing  Goal: Absence of Hospital-Acquired Illness or Injury  Outcome: Progressing  Goal: Optimal Comfort and Wellbeing  Outcome: Progressing  Goal: Readiness for Transition of Care  Outcome: Progressing     Problem: Bariatric Environmental Safety  Goal: Safety Maintained with Care  Outcome: Progressing     Problem: Device-Related Complication Risk (Hemodialysis)  Goal: Safe, Effective Therapy Delivery  Outcome: Progressing     Problem: Hemodynamic Instability (Hemodialysis)  Goal: Effective Tissue Perfusion  Outcome: Progressing     Problem: Infection (Hemodialysis)  Goal: Absence of Infection Signs and Symptoms  Outcome: Progressing     Problem: Diabetes Comorbidity  Goal: Blood Glucose Level Within Targeted Range  Outcome: Progressing     Problem: Adjustment to Illness (Sepsis/Septic Shock)  Goal: Optimal Coping  Outcome: Progressing

## 2024-05-20 NOTE — PROGRESS NOTES
Patient arrived by bed.  Vital signs stable.  HD TX started via [facility] hd cathteter.  Isolation precautions maintained.

## 2024-05-20 NOTE — ASSESSMENT & PLAN NOTE
Patient's FSGs are controlled on current medication regimen.  Last A1c reviewed-   Lab Results   Component Value Date    HGBA1C 10.4 (H) 01/30/2024     Most recent fingerstick glucose reviewed-   Recent Labs   Lab 05/19/24  1208 05/19/24  1745 05/19/24  2138 05/20/24  0609   POCTGLUCOSE 160* 146* 127* 166*       Current correctional scale  Medium  Maintain anti-hyperglycemic dose as follows-   Antihyperglycemics (From admission, onward)    Start     Stop Route Frequency Ordered    04/17/24 1200  insulin aspart U-100 pen 4 Units         -- SubQ Every 6 hours 04/17/24 0938    04/13/24 2100  insulin detemir U-100 (Levemir) pen 27 Units         -- SubQ 2 times daily 04/13/24 0931        Hold Oral hypoglycemics while patient is in the hospital.  POCT glucose q6h

## 2024-05-20 NOTE — PROGRESS NOTES
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 History was given from patient's daughter. She was undergoing dialysis today and noticed she was lethargic, less alert than usual self. Pt completed dialysis and still not acting herself. EMS was called, fever of a 100.  On chart review, she did have an episode of large volume emesis around 1700.  Per EMS, she had a slightly elevated temp 100.0°F, glucose 300s.     In the ED: UA 2+ leuks, >100 WBC, many bacteria, WBC 17, CT abd/pelvis concerning for cystitis. Given vanc/zosyn    Overview/Hospital Course:  Patient was admitted to Hospital Medicine service for medical management and evaluation of urosepsis. Patient was continued on vanc/zosyn. Vascular Neurology consulted concerning mental status change with the recommendation to initiate ASA 81 QD and to obtain an MRI to r/o new stroke. Imaging pending. Nephrology was consulted for regularly scheduled dialysis. Afternoon of 4/12, patient was unable to tolerate filtration of volume during dialsis 2/2 hypotension. Patient received 500cc bolus and was transferred back to floor after finishing the session without volume removed. Will monitor for signs of volume overload. Tailoring insulin regimen while uptitrating tube feeds to goal rate. Staph Epi in all 4 bottles; ID with recommendation to continue Vanc & de-escalate meropenem to ertapenem on 04/15 through 4/16. Patient completed IV course of UTI coverage. Patient tolerated volume removal well in dialysis  throughout the rest of her hospital stay. Pending discharge to LTACH pending bed availability. Patient without BM with one episode of vomiting overnight 4/17. KUB with bowel gas and low concern for obstruction with no transition point noted. Escalating bowel regimen. Pending placement as of 4/22. Pulm consult placed to evaluate for possible trach downsize & eventual decannulation to assist placement if safe. Trach capping trial per pulm for 48h and will assess for decannulation on Monday. DVT studies negative. Pulm successfully decannulated pt 4/30, IR exchanged TDC. Pending swallow study with SLP and waiting for dispo options. Pt failed swallow study, placement pending. Working with PT on mobilize pt to sitting in a chair to expand placement options. Pt successfully mobilized using sandee lift but required 2 people to do so. Discussing dispo plan with family, likely d/c home if HD, DME needs able to be met. Pending acceptance at outpatient HD center. Ongoing placement difficulties d/t need for accepting HD facility to have sandee lift with 2 personnel to operate    Interval History: NAEON . Pending placement      Objective:     Vital Signs (Most Recent):  Temp: 97.5 °F (36.4 °C) (05/20/24 0815)  Pulse: 98 (05/20/24 1015)  Resp: 17 (05/20/24 0815)  BP: 121/80 (05/20/24 1015)  SpO2: 97 % (05/20/24 0815) Vital Signs (24h Range):  Temp:  [97.5 °F (36.4 °C)-99 °F (37.2 °C)] 97.5 °F (36.4 °C)  Pulse:  [] 98  Resp:  [17-18] 17  SpO2:  [97 %-100 %] 97 %  BP: (106-142)/(65-89) 121/80     Weight: 105.5 kg (232 lb 9.6 oz)  Body mass index is 36.43 kg/m².  No intake or output data in the 24 hours ending 05/20/24 1055      Physical Exam  Vitals and nursing note reviewed.   HENT:      Head: Normocephalic and atraumatic.   Neck:      Comments: Trach removed  Cardiovascular:      Rate and Rhythm: Normal rate and regular rhythm.      Heart sounds: Normal heart sounds.   Pulmonary:      Effort: Pulmonary effort is normal. No  respiratory distress.      Breath sounds: Normal breath sounds.   Abdominal:      General: Abdomen is flat. There is no distension.      Palpations: Abdomen is soft.      Tenderness: There is no abdominal tenderness.      Comments: PEG tube   Musculoskeletal:      Right lower leg: No edema.      Left lower leg: No edema.      Comments: Moves UEs spontaneously   Skin:     General: Skin is warm and dry.   Neurological:      General: No focal deficit present.      Mental Status: She is alert.      Comments: Nonverbal, not following commands             Significant Labs: All pertinent labs within the past 24 hours have been reviewed.    Significant Imaging: I have reviewed all pertinent imaging results/findings within the past 24 hours.    Assessment/Plan:      * Acute cystitis with hematuria  resolved    Pt presenting with AMS and worsening lethargy. Pt is non-verbal at baseline, does not follow commands, but was less responsive than normal. Pt found to have a fever. Urinary source suspected based off of urine and CT abd/pelvis. Pt has many prior resistant bacterial infections including urinary sources. Has prior with sensitivity to zosyn and has also improved off ED dose of vanc/zosyn. Lactic elevated a 3.8->3.49 prior to completion of fluid bolus 500ml.    Patient with new fever and tachycardia on 4/25. Low threshold to initiate additional infectious workup and repeat UA.     Plan  S/p course of vanc/ertapenem per ID. Course completed 4/16.  Daily cbc  Contact precautions    *on contact precautions indefinitely while patient still makes urine given pt incontinent per infection control    Debility  Needs:  Wheelchair: patient has a mobility limitation that significantly impairs her ability to participate in one or more mobility related activities of daily living (MRADL's) such as toileting, feeding, dressing, grooming, and bathing in customary locations in the home.  The mobility limitation cannot be sufficiently  resolved by the use of a cane or walker.   The use of a manual wheelchair will significantly improve the patient's ability to participate in MRADLS and the patient will use it on regular basis in the home.  Family/pt has expressed her willingness to use a manual wheelchair in the home.  She also has a caregiver who is capable of assisting in mobility.    Mrs Saravia requires a hospital bed due to her requiring positioning of the body in ways not feasible with an ordinary bed to alleviate pain/ is completely immobile /or limited mobility and cannot independently make changes in body position without the use of the bed.  The positioning of the body cannot be sufficiently resolved by the use of pillows and wedges    Patient has a mobility limitation that significantly impairs their ability to participate in one or more mobility related activities of daily living, including toileting. This deficit can be resolved by using a bedside commode. Patient demonstrates mobility limitations that will cause them to be confined to one room at home without bathroom access for up to 30 days. Using a bedside commode will greatly improve the patient's ability to participate in MRADLs       Complication of vascular dialysis catheter  Cath intermittently clogging, not resolving with cath-hedy, going for IR venogram 4/30 with possible exchange. S/p exchange with IR on 4/30      Constipation  RESOLVED with Bowel regimen  Patient without BM x5d. One episode of vomitus night of 4/17. Some concern for bowel obstruction. Tolerating continuous tube feeds at goal rate with 0cc on residuals. Physical exam with bowel sounds, soft nontender abdomen. KUB without concern for obstruction with bowel gas, lack of transition point.    Patient now with regular bowel movements on bowel regimen.     Plan  - Miralax BID, Senna BID  - compazine PRN    Moderate malnutrition  Nutrition consulted. Most recent weight and BMI monitored-     Measurements:  Wt  Readings from Last 1 Encounters:   05/16/24 105.5 kg (232 lb 9.6 oz)   Body mass index is 36.43 kg/m².    Patient has been screened and assessed by RD.    Malnutrition Type:  Context: acute illness or injury  Level: moderate    Malnutrition Characteristic Summary:  Weight Loss (Malnutrition): 10% in 6 months  Subcutaneous Fat (Malnutrition): mild depletion  Muscle Mass (Malnutrition): mild depletion  Fluid Accumulation (Malnutrition): mild    Interventions/Recommendations (treatment strategy):  1.    -- TF  -- going for swallow study with SLP to assess possibility of pleasure oral feedings --> failed repeatedly    Prolonged QT interval  Qtc 526 [04/10/24]      limit Qtc prolonging drugs as able    Spastic hemiplegia of right dominant side as late effect of cerebrovascular disease  Important that patient is able to sit in chair for 4h for dialysis placement needs, even if she requires lift/assistance getting into the chair     This patient has Chronic right hemiplegia due to stroke. Physical therapy services has been scheduled. Continue all standard measures for pressure injury prevention and consult wound care for any wounds (chronic or acute).    ESRD (end stage renal disease) on dialysis  Creatine stable for now. BMP reviewed- noted Estimated Creatinine Clearance: 12.9 mL/min (A) (based on SCr of 6.3 mg/dL (H)). according to latest data. Based on current GFR, CKD stage is end stage.  Monitor UOP and serial BMP and adjust therapy as needed. Renally dose meds. Avoid nephrotoxic medications and procedures.    -- HD MWF  -- nephro following    Status post tracheostomy  Resolved, decannulated 4/30    Acute encephalopathy  Pt is non-verbal and does not follow commands at baseline. Vascular Neurology consulted given acute change from baseline. MRI with concern for evolution of prior strokes with noted differential of T2 hyperintensities including vasculitis vs demyelination. Discussed with Vascular Neurology; low concern  for ongoing vasculitis/demyelination, likely represents typical evolution of prior infarcts.    Patient at baseline as of 4/22.     Plan  - STAT head imaging for concern of new change in mental status/focal deficit    Type 2 diabetes mellitus with hyperglycemia, with long-term current use of insulin  Patient's FSGs are controlled on current medication regimen.  Last A1c reviewed-   Lab Results   Component Value Date    HGBA1C 10.4 (H) 01/30/2024     Most recent fingerstick glucose reviewed-   Recent Labs   Lab 05/19/24  1208 05/19/24  1745 05/19/24  2138 05/20/24  0609   POCTGLUCOSE 160* 146* 127* 166*       Current correctional scale  Medium  Maintain anti-hyperglycemic dose as follows-   Antihyperglycemics (From admission, onward)      Start     Stop Route Frequency Ordered    04/17/24 1200  insulin aspart U-100 pen 4 Units         -- SubQ Every 6 hours 04/17/24 0938    04/13/24 2100  insulin detemir U-100 (Levemir) pen 27 Units         -- SubQ 2 times daily 04/13/24 0931          Hold Oral hypoglycemics while patient is in the hospital.  POCT glucose q6h    Hyponatremia  Patient has hyponatremia which is uncontrolled,We will aim to correct the sodium by 4-6mEq in 24 hours. We will monitor sodium Daily. The hyponatremia is due to ESRD. Discussed with nephrology, dialysate bath adjusted to account for and correct hyponatremia.  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 Pt underwent extensive course, currently still healing from this, but no longer has wound vac. Pt's wound currently covered with bandage. Picture in media tabs    Plan  Wound care        VTE Risk Mitigation (From admission, onward)           Ordered     heparin (porcine) injection 1,000 Units  As needed (PRN)         05/14/24 1138     heparin (porcine) injection 3,000 Units  As needed (PRN)         04/17/24 0825     heparin (porcine) injection 7,500  Units  Every 8 hours         04/11/24 0252                    Discharge Planning   ZEKE: 5/20/2024     Code Status: Full Code   Is the patient medically ready for discharge?: No    Reason for patient still in hospital (select all that apply): Patient trending condition  Discharge Plan A: New Nursing Home placement - detention care facility                  Osito Dos Santos MD  Department of Hospital Medicine   [facility] - Telemetry Stepdown

## 2024-05-20 NOTE — PT/OT/SLP PROGRESS
Physical Therapy      Patient Name:  Sarah Saravia   MRN:  [id number]    Patient not seen today secondary to Pt at dialysis in the AM. Pt recently finished working with OT who reports pt with episode of vomitting at the end of session in pm. Will follow-up on next scheduled visit.  .

## 2024-05-21 LAB
ALBUMIN SERPL BCP-MCNC: 3.1 G/DL (ref 3.5–5.2)
ANION GAP SERPL CALC-SCNC: 12 MMOL/L (ref 8–16)
BUN SERPL-MCNC: 40 MG/DL (ref 6–20)
CALCIUM SERPL-MCNC: 10.1 MG/DL (ref 8.7–10.5)
CHLORIDE SERPL-SCNC: 94 MMOL/L (ref 95–110)
CO2 SERPL-SCNC: 26 MMOL/L (ref 23–29)
CREAT SERPL-MCNC: 4.5 MG/DL (ref 0.5–1.4)
EST. GFR  (NO RACE VARIABLE): 11.1 ML/MIN/1.73 M^2
GLUCOSE SERPL-MCNC: 179 MG/DL (ref 70–110)
PHOSPHATE SERPL-MCNC: 3.7 MG/DL (ref 2.7–4.5)
POCT GLUCOSE: 143 MG/DL (ref 70–110)
POCT GLUCOSE: 156 MG/DL (ref 70–110)
POCT GLUCOSE: 164 MG/DL (ref 70–110)
POCT GLUCOSE: 166 MG/DL (ref 70–110)
POCT GLUCOSE: 177 MG/DL (ref 70–110)
POCT GLUCOSE: 193 MG/DL (ref 70–110)
POCT GLUCOSE: 197 MG/DL (ref 70–110)
POTASSIUM SERPL-SCNC: 4.1 MMOL/L (ref 3.5–5.1)
SODIUM SERPL-SCNC: 132 MMOL/L (ref 136–145)

## 2024-05-21 PROCEDURE — 80069 RENAL FUNCTION PANEL: CPT | Performed by: INTERNAL MEDICINE

## 2024-05-21 PROCEDURE — 97530 THERAPEUTIC ACTIVITIES: CPT | Mod: CQ

## 2024-05-21 PROCEDURE — 25000003 PHARM REV CODE 250

## 2024-05-21 PROCEDURE — 27000207 HC ISOLATION

## 2024-05-21 PROCEDURE — 99232 SBSQ HOSP IP/OBS MODERATE 35: CPT | Mod: ,,, | Performed by: INTERNAL MEDICINE

## 2024-05-21 PROCEDURE — 97112 NEUROMUSCULAR REEDUCATION: CPT | Mod: CQ

## 2024-05-21 PROCEDURE — 20600001 HC STEP DOWN PRIVATE ROOM

## 2024-05-21 PROCEDURE — 92526 ORAL FUNCTION THERAPY: CPT

## 2024-05-21 PROCEDURE — 25000242 PHARM REV CODE 250 ALT 637 W/ HCPCS

## 2024-05-21 PROCEDURE — 36415 COLL VENOUS BLD VENIPUNCTURE: CPT | Performed by: INTERNAL MEDICINE

## 2024-05-21 PROCEDURE — 97110 THERAPEUTIC EXERCISES: CPT

## 2024-05-21 PROCEDURE — 63600175 PHARM REV CODE 636 W HCPCS

## 2024-05-21 RX ORDER — HEPARIN SODIUM 1000 [USP'U]/ML
1000 INJECTION, SOLUTION INTRAVENOUS; SUBCUTANEOUS
Status: DISCONTINUED | OUTPATIENT
Start: 2024-05-22 | End: 2024-06-10

## 2024-05-21 RX ORDER — SODIUM CHLORIDE 9 MG/ML
INJECTION, SOLUTION INTRAVENOUS ONCE
Status: DISCONTINUED | OUTPATIENT
Start: 2024-05-22 | End: 2024-05-23

## 2024-05-21 RX ADMIN — Medication 500 MG: at 09:05

## 2024-05-21 RX ADMIN — INSULIN ASPART 4 UNITS: 100 INJECTION, SOLUTION INTRAVENOUS; SUBCUTANEOUS at 06:05

## 2024-05-21 RX ADMIN — INSULIN DETEMIR 27 UNITS: 100 INJECTION, SOLUTION SUBCUTANEOUS at 10:05

## 2024-05-21 RX ADMIN — ZINC SULFATE 220 MG (50 MG) CAPSULE 220 MG: CAPSULE at 09:05

## 2024-05-21 RX ADMIN — PSYLLIUM HUSK 1 PACKET: 3.4 POWDER ORAL at 09:05

## 2024-05-21 RX ADMIN — INSULIN ASPART 4 UNITS: 100 INJECTION, SOLUTION INTRAVENOUS; SUBCUTANEOUS at 12:05

## 2024-05-21 RX ADMIN — HEPARIN SODIUM 7500 UNITS: 5000 INJECTION INTRAVENOUS; SUBCUTANEOUS at 03:05

## 2024-05-21 RX ADMIN — HEPARIN SODIUM 7500 UNITS: 5000 INJECTION INTRAVENOUS; SUBCUTANEOUS at 06:05

## 2024-05-21 RX ADMIN — PANTOPRAZOLE SODIUM 40 MG: 40 GRANULE, DELAYED RELEASE ORAL at 09:05

## 2024-05-21 RX ADMIN — ASPIRIN 81 MG CHEWABLE TABLET 81 MG: 81 TABLET CHEWABLE at 09:05

## 2024-05-21 RX ADMIN — INSULIN DETEMIR 27 UNITS: 100 INJECTION, SOLUTION SUBCUTANEOUS at 09:05

## 2024-05-21 RX ADMIN — ATORVASTATIN CALCIUM 40 MG: 40 TABLET, FILM COATED ORAL at 09:05

## 2024-05-21 RX ADMIN — METOPROLOL TARTRATE 25 MG: 25 TABLET, FILM COATED ORAL at 09:05

## 2024-05-21 RX ADMIN — HEPARIN SODIUM 7500 UNITS: 5000 INJECTION INTRAVENOUS; SUBCUTANEOUS at 10:05

## 2024-05-21 NOTE — PLAN OF CARE
[facility] - Telemetry Stepdown  Discharge Reassessment    Primary Care Provider: No, Primary Doctor    Expected Discharge Date: 5/27/2024    Reassessment (most recent)       Discharge Reassessment - 05/21/24 1615          Discharge Reassessment    Assessment Type Discharge Planning Reassessment     Did the patient's condition or plan change since previous assessment? No     Discharge Plan discussed with: Sibling     Communicated ZEKE with patient/caregiver Yes     Discharge Plan A Skilled Nursing Facility     Discharge Plan B Home with family     DME Needed Upon Discharge  other (see comments)   TBD    Transition of Care Barriers Nursing Home rejection;Mobility;DIalysis placement issues     Why the patient remains in the hospital Placement issues        Post-Acute Status    Post-Acute Authorization Placement     Post-Acute Placement Status Referrals Sent     Diaylsis Status Referrals Sent                   Discharge Plan A and Plan B have been determined by review of patient's clinical status, future medical and therapeutic needs, and coverage/benefits for post-acute care in coordination with multidisciplinary team members.     Sara Loredo RN  Ext [phone]

## 2024-05-21 NOTE — PROGRESS NOTES
Please see previous notes from this SW for continuity.    __________________________________________________  SW received notification via secure chat from [facility] Mathis. Per Delfina, pt denied at [facility].    SW sent message via [facility] online portal requesting update on pt's referral [facility]. SW awaiting response. SW called [facility], no answer. SW to attempt contact again later today.    Inpt treatment team updated via secure chat.    UPDATE 1:24PM: SW received notification from [facility], pt denied for outpt dialysis unit. Pt's referral has now been cancelled by Laura horn, [facility], and multiple [facility] units.     Case discussed with inpt team. Pt may require higher acuity of care than standard dialysis units can provide. KARI discussed the option of [facility] (high acuity unit), however this may require pt to discharge to [facility] nursing home. KARI awaiting response regarding next steps.      Dafne Mccord, GAYLA  [facility]  X [phone]

## 2024-05-21 NOTE — ASSESSMENT & PLAN NOTE
Needs:  Wheelchair: patient has a mobility limitation that significantly impairs her ability to participate in one or more mobility related activities of daily living (MRADL's) such as toileting, feeding, dressing, grooming, and bathing in customary locations in the home.  The mobility limitation cannot be sufficiently resolved by the use of a cane or walker.   The use of a manual wheelchair will significantly improve the patient's ability to participate in MRADLS and the patient will use it on regular basis in the home.  Family/pt has expressed her willingness to use a manual wheelchair in the home.  She also has a caregiver who is capable of assisting in mobility.    Mrs Saravia requires a hospital bed due to her requiring positioning of the body in ways not feasible with an ordinary bed to alleviate pain/ is completely immobile /or limited mobility and cannot independently make changes in body position without the use of the bed.  The positioning of the body cannot be sufficiently resolved by the use of pillows and wedges    Patient has a mobility limitation that significantly impairs their ability to participate in one or more mobility related activities of daily living, including toileting. This deficit can be resolved by using a bedside commode. Patient demonstrates mobility limitations that will cause them to be confined to one room at home without bathroom access for up to 30 days. Using a bedside commode will greatly improve the patient's ability to participate in MRADLs      yes

## 2024-05-21 NOTE — PLAN OF CARE
Problem: Infection  Goal: Absence of Infection Signs and Symptoms  Outcome: Progressing     Problem: Skin Injury Risk Increased  Goal: Skin Health and Integrity  Outcome: Progressing     Problem: Adult Inpatient Plan of Care  Goal: Plan of Care Review  Outcome: Progressing  Goal: Patient-Specific Goal (Individualized)  Outcome: Progressing  Goal: Absence of Hospital-Acquired Illness or Injury  Outcome: Progressing  Goal: Optimal Comfort and Wellbeing  Outcome: Progressing  Goal: Readiness for Transition of Care  Outcome: Progressing     Problem: Bariatric Environmental Safety  Goal: Safety Maintained with Care  Outcome: Progressing     Problem: Device-Related Complication Risk (Hemodialysis)  Goal: Safe, Effective Therapy Delivery  Outcome: Progressing     Problem: Hemodynamic Instability (Hemodialysis)  Goal: Effective Tissue Perfusion  Outcome: Progressing     Problem: Infection (Hemodialysis)  Goal: Absence of Infection Signs and Symptoms  Outcome: Progressing     Problem: Adjustment to Illness (Sepsis/Septic Shock)  Goal: Optimal Coping  Outcome: Progressing     Problem: Diabetes Comorbidity  Goal: Blood Glucose Level Within Targeted Range  Outcome: Progressing     Problem: Glycemic Control Impaired (Sepsis/Septic Shock)  Goal: Blood Glucose Level Within Desired Range  Outcome: Progressing     Problem: Infection Progression (Sepsis/Septic Shock)  Goal: Absence of Infection Signs and Symptoms  Outcome: Progressing     Problem: Nutrition Impaired (Sepsis/Septic Shock)  Goal: Optimal Nutrition Intake  Outcome: Progressing     Problem: Fall Injury Risk  Goal: Absence of Fall and Fall-Related Injury  Outcome: Progressing     Problem: Adjustment to Illness (Chronic Kidney Disease)  Goal: Optimal Coping with Chronic Illness  Outcome: Progressing  Goal: Electrolyte Balance  Outcome: Progressing  Goal: Fluid Balance  Outcome: Progressing     Problem: Electrolyte Imbalance (Chronic Kidney Disease)  Goal: Electrolyte  Balance  Outcome: Progressing

## 2024-05-21 NOTE — PT/OT/SLP PROGRESS
Physical Therapy Treatment    Patient Name:  Sarah Saravia   MRN:  [id number]    Recommendations:     Discharge Recommendations: Moderate Intensity Therapy  Discharge Equipment Recommendations: lift device, hospital bed, wheelchair  Barriers to discharge:  evolving clinical presentation    Patient has a mobility limitation that significantly impairs her ability to participate in one or more mobility related activities of daily living (MRADL's) such as toileting, feeding, dressing, grooming, and bathing in customary locations in the home. The mobility limitation cannot be sufficiently resolved by the use of a cane or walker. The use of a manual wheelchair will significantly improve the patient's ability to participate in MRADLS and the patient will use it on regular basis in the home. Patient has expressed her willingness to use a manual wheelchair in the home. She also has a caregiver who is     Patient requires a hospital bed due to her requiring positioning of the body in ways not feasible with an ordinary bed to alleviate pain/ is completely immobile /or limited mobility and cannot independently make changes in body position without the use of the bed. The positioning of the body cannot be sufficiently resolved by the use of pillows and wedges     Assessment:     Sarah Saravia is a 53 y.o. female admitted with a medical diagnosis of Acute cystitis with hematuria.  She presents with the following impairments/functional limitations: weakness, impaired endurance, impaired self care skills, impaired functional mobility, gait instability, impaired balance, decreased upper extremity function, decreased lower extremity function, decreased safety awareness, pain, decreased ROM, impaired skin Pt demonstrated an increase in alertness but is still presenting with minimal ability to follow commands and poor initiation. Pt requires total assistance with sit <> stand transitions, OOB/BTB transfers, and activity to prevent  falls due to weakness, instability, balance, general deconditioning, pain, and fatigue. Pt continues to show interest and motivation with PT treatment sessions. In light of pt's current condition, it is recommended they participate in moderate intensity therapy program with PT and OT skilled services to gain maximum return of function. Pt can continue to benefit from skilled therapy to increase endurance, strength, mobility, and return to PLOF.       Rehab Prognosis: Fair; patient would benefit from acute skilled PT services to address these deficits and reach maximum level of function.    Recent Surgery: * No surgery found *      Plan:     During this hospitalization, patient to be seen 3 x/week to address the identified rehab impairments via therapeutic activities, therapeutic exercises, neuromuscular re-education and progress toward the following goals:    Plan of Care Expires:  05/22/24    Subjective     Chief Complaint: pt agreeable to therapy    Pain/Comfort:  Pain Rating 1:  (no rating provided)  Location - Side 1: Bilateral  Location - Orientation 1: generalized  Location 1: knee  Pain Addressed 1: Reposition, Distraction, Cessation of Activity      Objective:     Communicated with nurse (Faustino) prior to session.  Patient found supine with blood pressure cuff, PEG Tube, telemetry, Trialysis, no family present upon PT entry to room.     General Precautions: Standard, aphasia, aspiration, fall, NPO, contact (ESBL/MDRO in urine)  Orthopedic Precautions: N/A  Braces: N/A  Respiratory Status: Room air     Pt requires assistance with change of pads and cleaning due to BM    Functional Mobility:  Bed Mobility:     Rolling Right: total A x1 person via draw sheet   Scooting: anteriorly to the EOB with max A via draw sheet  Supine to Sit: total A for trunk elevation and LE  Sit to Supine: total A x2 for trunk and LE elevation  Transfers:     Sit to Stand:  from the EOB max A x2 with no AD (pt only able to achieve  minimal hip clearance). Pt declined another attempt due to B knee pain.  Bed to Medi-Chair: dependent x3 with  no AD  using  Slide Board and Drawsheet  Balance: static sitting at the EOB x20min progressing from mod A to SBA. Pt demonstrated a lateral lean to the L, requiring vc's for posture and returning to midline; dynamic sitting balance min/mod A with target reaching, single UE support on the mattress.      AM-PAC 6 CLICK MOBILITY  Turning over in bed (including adjusting bedclothes, sheets and blankets)?: 1  Sitting down on and standing up from a chair with arms (e.g., wheelchair, bedside commode, etc.): 1  Moving from lying on back to sitting on the side of the bed?: 1  Moving to and from a bed to a chair (including a wheelchair)?: 1  Need to walk in hospital room?: 1  Climbing 3-5 steps with a railing?: 1  Basic Mobility Total Score: 6       Treatment & Education:  Pt educated on the importance of getting OOB and spending time sitting on the EOB. Pt was educated on the effects of immobility and moving throughout the day to decrease recovery time and promote healing. Pt was instructed to call for hospital staff assistance when needed and not to get up without assistance.     Pt completed B LE LAQs with AAROM x5 each    Patient left  up in Medi chair  with call button in reach and Faustino notified..    GOALS:   Multidisciplinary Problems       Physical Therapy Goals          Problem: Physical Therapy    Goal Priority Disciplines Outcome Goal Variances Interventions   Physical Therapy Goal     PT, PT/OT Progressing     Description: Goals to be met by: 24   Goals remain appropriate 5/3/2024 to be met by 24  Goals updated 2024 to be met by 24    Patient will increase functional independence with mobility by performin. Supine to sit with Maximum Assistance  2. Sit to supine with Maximum Assistance  3. Rolling to Left and Right with Moderate Assistance.  4. Sitting at edge of bed 5 minutes  with Moderate Assistance- MET 04/30, monitor consistency  5. Lower extremity exercise program x20 reps per handout, with assistance as needed                         Time Tracking:     PT Received On: 05/21/24  PT Start Time: 0932     PT Stop Time: 1024  PT Total Time (min): 52 min     Billable Minutes: Therapeutic Activity 30, Neuromuscular Re-education 22    Treatment Type: Treatment  PT/PTA: PTA     Number of PTA visits since last PT visit: 2     05/21/2024

## 2024-05-21 NOTE — SUBJECTIVE & OBJECTIVE
Interval History: HD yesterday, tolerated well. Net UF 2L. NA improved to 132 with HD     Review of patient's allergies indicates:  No Known Allergies  Current Facility-Administered Medications   Medication Frequency    [START ON 5/22/2024] 0.9%  NaCl infusion Once    acetaminophen oral solution 650 mg Q6H PRN    ascorbic acid (vitamin C) tablet 500 mg BID    aspirin chewable tablet 81 mg Daily    atorvastatin tablet 40 mg Daily    dextrose 10 % infusion Continuous PRN    dextrose 10% bolus 125 mL 125 mL PRN    dextrose 10% bolus 250 mL 250 mL PRN    epoetin merry injection 6,100 Units Every Mon, Wed, Fri    glucagon (human recombinant) injection 1 mg PRN    glucose chewable tablet 16 g PRN    glucose chewable tablet 24 g PRN    heparin (porcine) injection 1,000 Units PRN    [START ON 5/22/2024] heparin (porcine) injection 1,000 Units PRN    heparin (porcine) injection 3,000 Units PRN    heparin (porcine) injection 7,500 Units Q8H    hepatitis B virus vacc.rec(PF) injection 20 mcg vaccine x 1 dose    insulin aspart U-100 pen 4 Units Q6H    insulin detemir U-100 (Levemir) pen 27 Units BID    metoprolol tartrate (LOPRESSOR) tablet 25 mg BID    ondansetron 4 mg/5 mL solution 4 mg Q6H PRN    pantoprazole suspension 40 mg Daily    psyllium husk (aspartame) 3.4 gram packet 1 packet Daily    sodium chloride 0.9% bolus 250 mL 250 mL PRN    sodium chloride 0.9% flush 10 mL Q12H PRN    zinc sulfate capsule 220 mg Daily       Objective:     Vital Signs (Most Recent):  Temp: 97.7 °F (36.5 °C) (05/21/24 0821)  Pulse: 110 (05/21/24 0821)  Resp: 18 (05/21/24 0821)  BP: 111/63 (05/21/24 0821)  SpO2: 99 % (05/21/24 0821) Vital Signs (24h Range):  Temp:  [97.7 °F (36.5 °C)-99.5 °F (37.5 °C)] 97.7 °F (36.5 °C)  Pulse:  [] 110  Resp:  [18] 18  SpO2:  [98 %-100 %] 99 %  BP: (106-136)/(58-87) 111/63     Weight: 105.5 kg (232 lb 9.6 oz) (05/16/24 1121)  Body mass index is 36.43 kg/m².  Body surface area is 2.23 meters squared.    I/O  "last 3 completed shifts:  In: 300 [Other:300]  Out: 2600 [Other:2600]     Physical Exam  Vitals and nursing note reviewed.   HENT:      Head: Normocephalic and atraumatic.   Neck:      Comments: Trach removed  Cardiovascular:      Rate and Rhythm: Normal rate and regular rhythm.      Heart sounds: Normal heart sounds.   Pulmonary:      Effort: Pulmonary effort is normal. No respiratory distress.      Breath sounds: Normal breath sounds.   Abdominal:      General: Abdomen is flat. There is no distension.      Palpations: Abdomen is soft.      Tenderness: There is no abdominal tenderness.      Comments: PEG tube   Musculoskeletal:      Right lower leg: No edema.      Left lower leg: No edema.      Comments: Moves UEs spontaneously   Skin:     General: Skin is warm and dry.   Neurological:      General: No focal deficit present.      Mental Status: She is alert.      Comments: Nonverbal          Significant Labs:  CBC: No results for input(s): "WBC", "RBC", "HGB", "HCT", "PLT", "MCV", "MCH", "MCHC" in the last 168 hours.  CMP:   Recent Labs   Lab 05/21/24  0842   *   CALCIUM 10.1   ALBUMIN 3.1*   *   K 4.1   CO2 26   CL 94*   BUN 40*   CREATININE 4.5*     All labs within the past 24 hours have been reviewed.       "

## 2024-05-21 NOTE — ASSESSMENT & PLAN NOTE
Creatine stable for now. BMP reviewed- noted Estimated Creatinine Clearance: 18.1 mL/min (A) (based on SCr of 4.5 mg/dL (H)). according to latest data. Based on current GFR, CKD stage is end stage.  Monitor UOP and serial BMP and adjust therapy as needed. Renally dose meds. Avoid nephrotoxic medications and procedures.    -- HD MWF  -- nephro following

## 2024-05-21 NOTE — SUBJECTIVE & OBJECTIVE
Interval History: NAEON. Pending last placement option at [facility]      Objective:     Vital Signs (Most Recent):  Temp: 97.5 °F (36.4 °C) (05/21/24 1110)  Pulse: 106 (05/21/24 1110)  Resp: 18 (05/21/24 1110)  BP: 124/71 (05/21/24 1110)  SpO2: 98 % (05/21/24 1110) Vital Signs (24h Range):  Temp:  [97.5 °F (36.4 °C)-99.5 °F (37.5 °C)] 97.5 °F (36.4 °C)  Pulse:  [] 106  Resp:  [18] 18  SpO2:  [98 %-100 %] 98 %  BP: (106-136)/(63-87) 124/71     Weight: 105.5 kg (232 lb 9.6 oz)  Body mass index is 36.43 kg/m².  No intake or output data in the 24 hours ending 05/21/24 1247      Physical Exam  Vitals and nursing note reviewed.   HENT:      Head: Normocephalic and atraumatic.   Neck:      Comments: Trach removed  Cardiovascular:      Rate and Rhythm: Normal rate and regular rhythm.      Heart sounds: Normal heart sounds.   Pulmonary:      Effort: Pulmonary effort is normal. No respiratory distress.      Breath sounds: Normal breath sounds.   Abdominal:      General: Abdomen is flat. There is no distension.      Palpations: Abdomen is soft.      Tenderness: There is no abdominal tenderness.      Comments: PEG tube   Musculoskeletal:      Right lower leg: No edema.      Left lower leg: No edema.      Comments: Moves UEs spontaneously   Skin:     General: Skin is warm and dry.   Neurological:      General: No focal deficit present.      Mental Status: She is alert.      Comments: Nonverbal             Significant Labs: All pertinent labs within the past 24 hours have been reviewed.    Significant Imaging: I have reviewed all pertinent imaging results/findings within the past 24 hours.

## 2024-05-21 NOTE — ASSESSMENT & PLAN NOTE
"53 y.o. Black or  Female ESRD-HD M-W-F at [facility] presents to ED on 4/10/2024 with UTI.    Of note, the patient was initiated on RRT in February 2024 after being admitted with ALVARADO 2/2 iATN due to septic shock. Perm cath placed on 2/29/24. She was transferred to [facility] on 3/12. Deemed ESRD at [facility].  Nephrology consulted for inpatient ESRD-HD management    Assessment:   - next dialysis session will be tomorrow   - Continue MWF iHD schedule while IP.   - Hyponatremic in setting of ESRD - consider decreasing FWF   - Continue to monitor intake and output  - Please avoid gadolinium, fleets, phos-based laxatives, NSAIDs  - Dialysis thrice weekly unless more urgent indications arise. Will evaluate RRT requirements Daily.    Anemia of ESRD hgb 9.7 c/w EPO with HD  No results for input(s): "WBC", "HGB", "HCT", "PLT" in the last 168 hours.    Lab Results   Component Value Date    FESATURATED 10 (L) 03/15/2024    FERRITIN 414 (H) 03/15/2024       - Goal in ESRD is Hgb of 10-11.       Secondary hyperparathyroid of renal origin   - phos/ca controlled off binders for now     "

## 2024-05-21 NOTE — PROGRESS NOTES
[facility] - Telemetry Stepdown  Nephrology  Progress Note    Patient Name: Sarah Saravia  MRN: [id number]  Admission Date: 4/10/2024  Hospital Length of Stay: 41 days  Attending Provider: Trevon Yoder MD   Primary Care Physician: Deanna, Primary Doctor  Principal Problem:Acute cystitis with hematuria    Subjective:     HPI: The patient is a 53 y.o. 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 The patient is unable to provide adequate history. Additional history and patient information was obtained from patient's daughter, past medical records and ER records. Per chart, she was undergoing dialysis Wednesday and was noted to be more lethargic than usual despite completing her HD session. EMS was called, fever of a 100. In the ED, UA 2+ leuks, >100 WBC, many bacteria, WBC 17, CT abd/pelvis concerning for cystitis. Given vanc/zosyn and admitted.     Of note, the patient was initiated on RRT in February 2024 after being admitted with ALVARADO 2/2 iATN due to septic shock. Perm cath placed on 2/29/24. She was transferred to [facility] on 3/12. Nephrology consulted for management of ESRD and HD treatment.      Interval History: HD yesterday, tolerated well. Net UF 2L. NA improved to 132 with HD     Review of patient's allergies indicates:  No Known Allergies  Current Facility-Administered Medications   Medication Frequency    [START ON 5/22/2024] 0.9%  NaCl infusion Once    acetaminophen oral solution 650 mg Q6H PRN    ascorbic acid (vitamin C) tablet 500 mg BID    aspirin chewable tablet 81 mg Daily    atorvastatin tablet 40 mg Daily    dextrose 10 % infusion Continuous PRN    dextrose 10% bolus 125 mL 125 mL PRN    dextrose 10% bolus 250 mL 250 mL PRN    epoetin merry injection 6,100 Units Every Mon, Wed, Fri    glucagon (human recombinant) injection 1 mg PRN    glucose  chewable tablet 16 g PRN    glucose chewable tablet 24 g PRN    heparin (porcine) injection 1,000 Units PRN    [START ON 5/22/2024] heparin (porcine) injection 1,000 Units PRN    heparin (porcine) injection 3,000 Units PRN    heparin (porcine) injection 7,500 Units Q8H    hepatitis B virus vacc.rec(PF) injection 20 mcg vaccine x 1 dose    insulin aspart U-100 pen 4 Units Q6H    insulin detemir U-100 (Levemir) pen 27 Units BID    metoprolol tartrate (LOPRESSOR) tablet 25 mg BID    ondansetron 4 mg/5 mL solution 4 mg Q6H PRN    pantoprazole suspension 40 mg Daily    psyllium husk (aspartame) 3.4 gram packet 1 packet Daily    sodium chloride 0.9% bolus 250 mL 250 mL PRN    sodium chloride 0.9% flush 10 mL Q12H PRN    zinc sulfate capsule 220 mg Daily       Objective:     Vital Signs (Most Recent):  Temp: 97.7 °F (36.5 °C) (05/21/24 0821)  Pulse: 110 (05/21/24 0821)  Resp: 18 (05/21/24 0821)  BP: 111/63 (05/21/24 0821)  SpO2: 99 % (05/21/24 0821) Vital Signs (24h Range):  Temp:  [97.7 °F (36.5 °C)-99.5 °F (37.5 °C)] 97.7 °F (36.5 °C)  Pulse:  [] 110  Resp:  [18] 18  SpO2:  [98 %-100 %] 99 %  BP: (106-136)/(58-87) 111/63     Weight: 105.5 kg (232 lb 9.6 oz) (05/16/24 1121)  Body mass index is 36.43 kg/m².  Body surface area is 2.23 meters squared.    I/O last 3 completed shifts:  In: 300 [Other:300]  Out: 2600 [Other:2600]     Physical Exam  Vitals and nursing note reviewed.   HENT:      Head: Normocephalic and atraumatic.   Neck:      Comments: Trach removed  Cardiovascular:      Rate and Rhythm: Normal rate and regular rhythm.      Heart sounds: Normal heart sounds.   Pulmonary:      Effort: Pulmonary effort is normal. No respiratory distress.      Breath sounds: Normal breath sounds.   Abdominal:      General: Abdomen is flat. There is no distension.      Palpations: Abdomen is soft.      Tenderness: There is no abdominal tenderness.      Comments: PEG tube   Musculoskeletal:      Right lower leg: No edema.       "Left lower leg: No edema.      Comments: Moves UEs spontaneously   Skin:     General: Skin is warm and dry.   Neurological:      General: No focal deficit present.      Mental Status: She is alert.      Comments: Nonverbal          Significant Labs:  CBC: No results for input(s): "WBC", "RBC", "HGB", "HCT", "PLT", "MCV", "MCH", "MCHC" in the last 168 hours.  CMP:   Recent Labs   Lab 05/21/24  0842   *   CALCIUM 10.1   ALBUMIN 3.1*   *   K 4.1   CO2 26   CL 94*   BUN 40*   CREATININE 4.5*     All labs within the past 24 hours have been reviewed.       Assessment/Plan:     Renal/  ESRD (end stage renal disease) on dialysis  53 y.o. Black or  Female ESRD-HD M-W-F at [facility] presents to ED on 4/10/2024 with UTI.    Of note, the patient was initiated on RRT in February 2024 after being admitted with ALVARADO 2/2 iATN due to septic shock. Perm cath placed on 2/29/24. She was transferred to [facility] on 3/12. Deemed ESRD at [facility].  Nephrology consulted for inpatient ESRD-HD management    Assessment:   - next dialysis session will be tomorrow   - Continue MWF iHD schedule while IP.   - Hyponatremic in setting of ESRD - consider decreasing FWF   - Continue to monitor intake and output  - Please avoid gadolinium, fleets, phos-based laxatives, NSAIDs  - Dialysis thrice weekly unless more urgent indications arise. Will evaluate RRT requirements Daily.    Anemia of ESRD hgb 9.7 c/w EPO with HD  No results for input(s): "WBC", "HGB", "HCT", "PLT" in the last 168 hours.    Lab Results   Component Value Date    FESATURATED 10 (L) 03/15/2024    FERRITIN 414 (H) 03/15/2024       - Goal in ESRD is Hgb of 10-11.       Secondary hyperparathyroid of renal origin   - phos/ca controlled off binders for now       Endocrine  Hyponatremia  Improved to 132 with HD  Free water restriction   Will continue to adjust with HD           Zaid Carlos MD  Nephrology  [facility] - Telemetry Stepdown  "
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
## 2024-05-21 NOTE — NURSING
.Nurses Note -- 4 Eyes      5/20/2024   7:55 PM      Skin assessed during: Q Shift Change      [] No Altered Skin Integrity Present    []Prevention Measures Documented      [x] Yes- Altered Skin Integrity Present or Discovered   [] LDA Added if Not in Epic (Describe Wound)   [] New Altered Skin Integrity was Present on Admit and Documented in LDA   [] Wound Image Taken    Wound Care Consulted? Yes    Attending Nurse:  Pk Eastman RN/Staff Member:  Faustino

## 2024-05-21 NOTE — PT/OT/SLP PROGRESS
Occupational Therapy   Treatment    Name: Sarah Saravia  MRN: [id number]  Admitting Diagnosis:  Acute cystitis with hematuria       Recommendations:     Discharge Recommendations: Moderate Intensity Therapy  Discharge Equipment Recommendations:  hospital bed, lift device, wheelchair, shower chair, bath bench  Barriers to discharge:  Other (Comment) (increased skilled assist required)    Assessment:     Sarah Saravia is a 53 y.o. female with a medical diagnosis of Acute cystitis with hematuria.  She presents with the following performance deficits affecting function: weakness, impaired endurance, impaired self care skills, impaired functional mobility, impaired cognition, decreased upper extremity function, decreased lower extremity function, decreased coordination, decreased ROM, impaired fine motor. Pt agreeable to session; however, pt demonstrating selective participation throughout session as indicated by intermittent periods of engagement and non-engagement: pt reaching for theraband once task terminated by OT, pt not initiating tasks, etc. Pt potentially no receptive to treatment secondary to fatigue after sandee lift back to bed prior to session. Pt would continue to benefit from skilled OT services to promote participation in functional tasks and increase UE strength and coordination needed for increased IND in ADL routines.     Rehab Prognosis:  Fair; patient would benefit from acute skilled OT services to address these deficits and reach maximum level of function.       Plan:     Patient to be seen 3 x/week to address the above listed problems via self-care/home management, therapeutic activities, therapeutic exercises, neuromuscular re-education  Plan of Care Expires: 06/13/24  Plan of Care Reviewed with: patient, family    Subjective     Chief Complaint: none reported, pt aphasic  Patient/Family Comments/goals: Pt family present in the room - did not have any questions about POC when asked.    Pain/Comfort:  Pain Rating 1: other (see comments) (pt aphasic, grimaced with PROM)    Objective:     Communicated with: Nursing prior to session.  Patient found supine with PEG Tube upon OT entry to room.    General Precautions: Standard, aphasia, aspiration, NPO, fall    Orthopedic Precautions:N/A  Braces: N/A  Respiratory Status: Room air     Occupational Performance:     Functional Mobility/Transfers:  Not observed secondary to pt transfer via sandee lift with nursing prior to session      [facility] 6 Click ADL: 8    Treatment & Education:  Provided education on importance of BUE mobility and strengthening to promote greater participation in ADL routine. Pt given theraputty (mix of 1/3 red, 2/3 yellow) to increase  strength to facilitate participation in functional tasks - pt performed RUE squeezes (~x10 reps). Pt was unable to initiate  on LUE - attempted to initiate AAROM for squeezes of LUE but terminated task after 3 repetitions secondary to pt grimacing. Educated pt to keep theraputty away from clothing and bedding - pt unable to verbalize understanding secondary to aphasia. Pt given yellow theraband and instructed to perform BUE shoulder flexion/extension, elbow flexion/extension, horizontal abductions, and external rotations. Pt performed 10 repetitions of AAROM of elbow flexion and extension and shoulder flexion and extension. Theraband was tied into loops on each end to improve . Pt also given red theraband and educated to use red once yellow becomes too easy. Asked family present in the room if they had any questions - no questions asked.     Patient left supine with all lines intact, call button in reach, and family present    GOALS:   Multidisciplinary Problems       Occupational Therapy Goals          Problem: Occupational Therapy    Goal Priority Disciplines Outcome Interventions   Occupational Therapy Goal     OT, PT/OT Progressing    Description: Goals to be met by: 5/22/24     Patient  will increase functional independence with ADLs by performing:    UE Dressing with Maximum Assistance.  Grooming while EOB with Maximum Assistance.  Sitting at edge of bed x5 minutes with Maximum Assistance.  Rolling to Bilateral with Moderate Assistance.   Supine to sit with Maximum Assistance.                         Time Tracking:     OT Date of Treatment: 05/21/24  OT Start Time: 1152  OT Stop Time: 1206  OT Total Time (min): 14 min    Billable Minutes:Therapeutic Exercise 14 minutes    OT/JOSÉ: OT     Number of JOSÉ visits since last OT visit: 1    5/21/2024

## 2024-05-21 NOTE — ASSESSMENT & PLAN NOTE
Patient's FSGs are controlled on current medication regimen.  Last A1c reviewed-   Lab Results   Component Value Date    HGBA1C 10.4 (H) 01/30/2024     Most recent fingerstick glucose reviewed-   Recent Labs   Lab 05/20/24  2114 05/21/24  0002 05/21/24  0601 05/21/24  0822   POCTGLUCOSE 144* 143* 177* 156*       Current correctional scale  Medium  Maintain anti-hyperglycemic dose as follows-   Antihyperglycemics (From admission, onward)    Start     Stop Route Frequency Ordered    04/17/24 1200  insulin aspart U-100 pen 4 Units         -- SubQ Every 6 hours 04/17/24 0938    04/13/24 2100  insulin detemir U-100 (Levemir) pen 27 Units         -- SubQ 2 times daily 04/13/24 0931        Hold Oral hypoglycemics while patient is in the hospital.  POCT glucose q6h

## 2024-05-21 NOTE — PROGRESS NOTES
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 History was given from patient's daughter. She was undergoing dialysis today and noticed she was lethargic, less alert than usual self. Pt completed dialysis and still not acting herself. EMS was called, fever of a 100.  On chart review, she did have an episode of large volume emesis around 1700.  Per EMS, she had a slightly elevated temp 100.0°F, glucose 300s.     In the ED: UA 2+ leuks, >100 WBC, many bacteria, WBC 17, CT abd/pelvis concerning for cystitis. Given vanc/zosyn    Overview/Hospital Course:  Patient was admitted to Hospital Medicine service for medical management and evaluation of urosepsis. Patient was continued on vanc/zosyn. Vascular Neurology consulted concerning mental status change with the recommendation to initiate ASA 81 QD and to obtain an MRI to r/o new stroke. Imaging pending. Nephrology was consulted for regularly scheduled dialysis. Afternoon of 4/12, patient was unable to tolerate filtration of volume during dialsis 2/2 hypotension. Patient received 500cc bolus and was transferred back to floor after finishing the session without volume removed. Will monitor for signs of volume overload. Tailoring insulin regimen while uptitrating tube feeds to goal rate. Staph Epi in all 4 bottles; ID with recommendation to continue Vanc & de-escalate meropenem to ertapenem on 04/15 through 4/16. Patient completed IV course of UTI coverage. Patient tolerated volume removal well in dialysis  throughout the rest of her hospital stay. Pending discharge to [facility]ACH pending bed availability. Patient without BM with one episode of vomiting overnight 4/17. KUB with bowel gas and low concern for obstruction with no transition point noted. Escalating bowel regimen. Pending placement as of 4/22. Pulm consult placed to evaluate for possible trach downsize & eventual decannulation to assist placement if safe. Trach capping trial per pulm for 48h and will assess for decannulation on Monday. DVT studies negative. Pulm successfully decannulated pt 4/30, IR exchanged TDC. Pending swallow study with SLP and waiting for dispo options. Pt failed swallow study, placement pending. Working with PT on mobilize pt to sitting in a chair to expand placement options. Pt successfully mobilized using sandee lift but required 2 people to do so. Discussing dispo plan with family, likely d/c home if HD, DME needs able to be met. Pending acceptance at outpatient HD center. Ongoing placement difficulties d/t need for accepting HD facility to have sandee lift with 2 personnel to operate    Interval History: [facility]. Pending last placement option at [facility]      Objective:     Vital Signs (Most Recent):  Temp: 97.5 °F (36.4 °C) (05/21/24 1110)  Pulse: 106 (05/21/24 1110)  Resp: 18 (05/21/24 1110)  BP: 124/71 (05/21/24 1110)  SpO2: 98 % (05/21/24 1110) Vital Signs (24h Range):  Temp:  [97.5 °F (36.4 °C)-99.5 °F (37.5 °C)] 97.5 °F (36.4 °C)  Pulse:  [] 106  Resp:  [18] 18  SpO2:  [98 %-100 %] 98 %  BP: (106-136)/(63-87) 124/71     Weight: 105.5 kg (232 lb 9.6 oz)  Body mass index is 36.43 kg/m².  No intake or output data in the 24 hours ending 05/21/24 1247      Physical Exam  Vitals and nursing note reviewed.   HENT:      Head: Normocephalic and atraumatic.   Neck:      Comments: Trach removed  Cardiovascular:      Rate and Rhythm: Normal rate and regular rhythm.      Heart sounds: Normal heart sounds.   Pulmonary:      Effort:  Pulmonary effort is normal. No respiratory distress.      Breath sounds: Normal breath sounds.   Abdominal:      General: Abdomen is flat. There is no distension.      Palpations: Abdomen is soft.      Tenderness: There is no abdominal tenderness.      Comments: PEG tube   Musculoskeletal:      Right lower leg: No edema.      Left lower leg: No edema.      Comments: Moves UEs spontaneously   Skin:     General: Skin is warm and dry.   Neurological:      General: No focal deficit present.      Mental Status: She is alert.      Comments: Nonverbal             Significant Labs: All pertinent labs within the past 24 hours have been reviewed.    Significant Imaging: I have reviewed all pertinent imaging results/findings within the past 24 hours.    Assessment/Plan:      * Acute cystitis with hematuria  resolved    Pt presenting with AMS and worsening lethargy. Pt is non-verbal at baseline, does not follow commands, but was less responsive than normal. Pt found to have a fever. Urinary source suspected based off of urine and CT abd/pelvis. Pt has many prior resistant bacterial infections including urinary sources. Has prior with sensitivity to zosyn and has also improved off ED dose of vanc/zosyn. Lactic elevated a 3.8->3.49 prior to completion of fluid bolus 500ml.    Patient with new fever and tachycardia on 4/25. Low threshold to initiate additional infectious workup and repeat UA.     Plan  S/p course of vanc/ertapenem per ID. Course completed 4/16.  Daily cbc  Contact precautions    *on contact precautions indefinitely while patient still makes urine given pt incontinent per infection control    Debility  Needs:  Wheelchair: patient has a mobility limitation that significantly impairs her ability to participate in one or more mobility related activities of daily living (MRADL's) such as toileting, feeding, dressing, grooming, and bathing in customary locations in the home.  The mobility limitation cannot be  sufficiently resolved by the use of a cane or walker.   The use of a manual wheelchair will significantly improve the patient's ability to participate in MRADLS and the patient will use it on regular basis in the home.  Family/pt has expressed her willingness to use a manual wheelchair in the home.  She also has a caregiver who is capable of assisting in mobility.    Mrs Saravia requires a hospital bed due to her requiring positioning of the body in ways not feasible with an ordinary bed to alleviate pain/ is completely immobile /or limited mobility and cannot independently make changes in body position without the use of the bed.  The positioning of the body cannot be sufficiently resolved by the use of pillows and wedges    Patient has a mobility limitation that significantly impairs their ability to participate in one or more mobility related activities of daily living, including toileting. This deficit can be resolved by using a bedside commode. Patient demonstrates mobility limitations that will cause them to be confined to one room at home without bathroom access for up to 30 days. Using a bedside commode will greatly improve the patient's ability to participate in MRADLs       Complication of vascular dialysis catheter  Cath intermittently clogging, not resolving with cath-hedy, going for IR venogram 4/30 with possible exchange. S/p exchange with IR on 4/30      Constipation  RESOLVED with Bowel regimen  Patient without BM x5d. One episode of vomitus night of 4/17. Some concern for bowel obstruction. Tolerating continuous tube feeds at goal rate with 0cc on residuals. Physical exam with bowel sounds, soft nontender abdomen. KUB without concern for obstruction with bowel gas, lack of transition point.    Patient now with regular bowel movements on bowel regimen.     Plan  - Miralax BID, Senna BID  - compazine PRN    Moderate malnutrition  Nutrition consulted. Most recent weight and BMI monitored-      Measurements:  Wt Readings from Last 1 Encounters:   05/16/24 105.5 kg (232 lb 9.6 oz)   Body mass index is 36.43 kg/m².    Patient has been screened and assessed by RD.    Malnutrition Type:  Context: acute illness or injury  Level: moderate    Malnutrition Characteristic Summary:  Weight Loss (Malnutrition): 10% in 6 months  Subcutaneous Fat (Malnutrition): mild depletion  Muscle Mass (Malnutrition): mild depletion  Fluid Accumulation (Malnutrition): mild    Interventions/Recommendations (treatment strategy):  1.    -- TF  -- going for swallow study with SLP to assess possibility of pleasure oral feedings --> failed repeatedly    Prolonged QT interval  Qtc 526 [04/10/24]      limit Qtc prolonging drugs as able    Spastic hemiplegia of right dominant side as late effect of cerebrovascular disease  Important that patient is able to sit in chair for 4h for dialysis placement needs, even if she requires lift/assistance getting into the chair     This patient has Chronic right hemiplegia due to stroke. Physical therapy services has been scheduled. Continue all standard measures for pressure injury prevention and consult wound care for any wounds (chronic or acute).    ESRD (end stage renal disease) on dialysis  Creatine stable for now. BMP reviewed- noted Estimated Creatinine Clearance: 18.1 mL/min (A) (based on SCr of 4.5 mg/dL (H)). according to latest data. Based on current GFR, CKD stage is end stage.  Monitor UOP and serial BMP and adjust therapy as needed. Renally dose meds. Avoid nephrotoxic medications and procedures.    -- HD MWF  -- nephro following    Status post tracheostomy  Resolved, decannulated 4/30    Acute encephalopathy  Pt is non-verbal and does not follow commands at baseline. Vascular Neurology consulted given acute change from baseline. MRI with concern for evolution of prior strokes with noted differential of T2 hyperintensities including vasculitis vs demyelination. Discussed with Vascular  Neurology; low concern for ongoing vasculitis/demyelination, likely represents typical evolution of prior infarcts.    Patient at baseline as of 4/22.     Plan  - STAT head imaging for concern of new change in mental status/focal deficit    Type 2 diabetes mellitus with hyperglycemia, with long-term current use of insulin  Patient's FSGs are controlled on current medication regimen.  Last A1c reviewed-   Lab Results   Component Value Date    HGBA1C 10.4 (H) 01/30/2024     Most recent fingerstick glucose reviewed-   Recent Labs   Lab 05/20/24  2114 05/21/24  0002 05/21/24  0601 05/21/24  0822   POCTGLUCOSE 144* 143* 177* 156*       Current correctional scale  Medium  Maintain anti-hyperglycemic dose as follows-   Antihyperglycemics (From admission, onward)      Start     Stop Route Frequency Ordered    04/17/24 1200  insulin aspart U-100 pen 4 Units         -- SubQ Every 6 hours 04/17/24 0938    04/13/24 2100  insulin detemir U-100 (Levemir) pen 27 Units         -- SubQ 2 times daily 04/13/24 0931          Hold Oral hypoglycemics while patient is in the hospital.  POCT glucose q6h    Hyponatremia  Patient has hyponatremia which is uncontrolled,We will aim to correct the sodium by 4-6mEq in 24 hours. We will monitor sodium Daily. The hyponatremia is due to ESRD. Discussed with nephrology, dialysate bath adjusted to account for and correct hyponatremia.  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 Pt underwent extensive course, currently still healing from this, but no longer has wound vac. Pt's wound currently covered with bandage. Picture in media tabs    Plan  Wound care        VTE Risk Mitigation (From admission, onward)           Ordered     heparin (porcine) injection 1,000 Units  As needed (PRN)         05/21/24 1017     heparin (porcine) injection 1,000 Units  As needed (PRN)         05/14/24 1138     heparin  (porcine) injection 3,000 Units  As needed (PRN)         04/17/24 0825     heparin (porcine) injection 7,500 Units  Every 8 hours         04/11/24 0252                    Discharge Planning   ZEKE: 5/24/2024     Code Status: Full Code   Is the patient medically ready for discharge?: No    Reason for patient still in hospital (select all that apply): Pending disposition  Discharge Plan A: New Nursing Home placement - [facility] care facility                  Osito Dos Santos MD  Department of Hospital Medicine   [facility] - Telemetry Stepdown

## 2024-05-21 NOTE — PLAN OF CARE
Problem: Infection  Goal: Absence of Infection Signs and Symptoms  Outcome: Progressing     Problem: Skin Injury Risk Increased  Goal: Skin Health and Integrity  Outcome: Progressing     Problem: Adult Inpatient Plan of Care  Goal: Plan of Care Review  Outcome: Progressing  Goal: Patient-Specific Goal (Individualized)  Outcome: Progressing

## 2024-05-21 NOTE — ASSESSMENT & PLAN NOTE
Patient has hyponatremia which is uncontrolled,We will aim to correct the sodium by 4-6mEq in 24 hours. We will monitor sodium Daily. The hyponatremia is due to ESRD. Discussed with nephrology, dialysate bath adjusted to account for and correct hyponatremia.  Recent Labs   Lab 05/21/24  0842   *

## 2024-05-22 LAB
ALBUMIN SERPL BCP-MCNC: 3.1 G/DL (ref 3.5–5.2)
ANION GAP SERPL CALC-SCNC: 15 MMOL/L (ref 8–16)
BUN SERPL-MCNC: 56 MG/DL (ref 6–20)
CALCIUM SERPL-MCNC: 10.4 MG/DL (ref 8.7–10.5)
CHLORIDE SERPL-SCNC: 92 MMOL/L (ref 95–110)
CO2 SERPL-SCNC: 23 MMOL/L (ref 23–29)
CREAT SERPL-MCNC: 5.9 MG/DL (ref 0.5–1.4)
EST. GFR  (NO RACE VARIABLE): 8 ML/MIN/1.73 M^2
GLUCOSE SERPL-MCNC: 158 MG/DL (ref 70–110)
PHOSPHATE SERPL-MCNC: 4.4 MG/DL (ref 2.7–4.5)
POCT GLUCOSE: 158 MG/DL (ref 70–110)
POCT GLUCOSE: 161 MG/DL (ref 70–110)
POCT GLUCOSE: 167 MG/DL (ref 70–110)
POCT GLUCOSE: 167 MG/DL (ref 70–110)
POCT GLUCOSE: 170 MG/DL (ref 70–110)
POCT GLUCOSE: 171 MG/DL (ref 70–110)
POCT GLUCOSE: 196 MG/DL (ref 70–110)
POTASSIUM SERPL-SCNC: 4.1 MMOL/L (ref 3.5–5.1)
SODIUM SERPL-SCNC: 130 MMOL/L (ref 136–145)

## 2024-05-22 PROCEDURE — 63600175 PHARM REV CODE 636 W HCPCS

## 2024-05-22 PROCEDURE — 25000003 PHARM REV CODE 250

## 2024-05-22 PROCEDURE — 63600175 PHARM REV CODE 636 W HCPCS: Performed by: NURSE PRACTITIONER

## 2024-05-22 PROCEDURE — 92526 ORAL FUNCTION THERAPY: CPT

## 2024-05-22 PROCEDURE — 20600001 HC STEP DOWN PRIVATE ROOM

## 2024-05-22 PROCEDURE — 97112 NEUROMUSCULAR REEDUCATION: CPT

## 2024-05-22 PROCEDURE — 25000242 PHARM REV CODE 250 ALT 637 W/ HCPCS

## 2024-05-22 PROCEDURE — 80100014 HC HEMODIALYSIS 1:1

## 2024-05-22 PROCEDURE — 80069 RENAL FUNCTION PANEL: CPT

## 2024-05-22 PROCEDURE — 36415 COLL VENOUS BLD VENIPUNCTURE: CPT

## 2024-05-22 PROCEDURE — 90935 HEMODIALYSIS ONE EVALUATION: CPT | Mod: ,,, | Performed by: NURSE PRACTITIONER

## 2024-05-22 PROCEDURE — 97530 THERAPEUTIC ACTIVITIES: CPT

## 2024-05-22 PROCEDURE — 27000207 HC ISOLATION

## 2024-05-22 PROCEDURE — 63600175 PHARM REV CODE 636 W HCPCS: Performed by: INTERNAL MEDICINE

## 2024-05-22 RX ADMIN — HEPARIN SODIUM 1000 UNITS: 1000 INJECTION, SOLUTION INTRAVENOUS; SUBCUTANEOUS at 11:05

## 2024-05-22 RX ADMIN — INSULIN ASPART 4 UNITS: 100 INJECTION, SOLUTION INTRAVENOUS; SUBCUTANEOUS at 06:05

## 2024-05-22 RX ADMIN — ZINC SULFATE 220 MG (50 MG) CAPSULE 220 MG: CAPSULE at 12:05

## 2024-05-22 RX ADMIN — PANTOPRAZOLE SODIUM 40 MG: 40 GRANULE, DELAYED RELEASE ORAL at 12:05

## 2024-05-22 RX ADMIN — HEPARIN SODIUM 7500 UNITS: 5000 INJECTION INTRAVENOUS; SUBCUTANEOUS at 06:05

## 2024-05-22 RX ADMIN — PSYLLIUM HUSK 1 PACKET: 3.4 POWDER ORAL at 12:05

## 2024-05-22 RX ADMIN — INSULIN DETEMIR 27 UNITS: 100 INJECTION, SOLUTION SUBCUTANEOUS at 08:05

## 2024-05-22 RX ADMIN — INSULIN ASPART 4 UNITS: 100 INJECTION, SOLUTION INTRAVENOUS; SUBCUTANEOUS at 12:05

## 2024-05-22 RX ADMIN — INSULIN ASPART 4 UNITS: 100 INJECTION, SOLUTION INTRAVENOUS; SUBCUTANEOUS at 05:05

## 2024-05-22 RX ADMIN — METOPROLOL TARTRATE 25 MG: 25 TABLET, FILM COATED ORAL at 12:05

## 2024-05-22 RX ADMIN — Medication 500 MG: at 08:05

## 2024-05-22 RX ADMIN — METOPROLOL TARTRATE 25 MG: 25 TABLET, FILM COATED ORAL at 08:05

## 2024-05-22 RX ADMIN — ASPIRIN 81 MG CHEWABLE TABLET 81 MG: 81 TABLET CHEWABLE at 12:05

## 2024-05-22 RX ADMIN — ATORVASTATIN CALCIUM 40 MG: 40 TABLET, FILM COATED ORAL at 12:05

## 2024-05-22 RX ADMIN — HEPARIN SODIUM 7500 UNITS: 5000 INJECTION INTRAVENOUS; SUBCUTANEOUS at 08:05

## 2024-05-22 RX ADMIN — ERYTHROPOIETIN 6100 UNITS: 10000 INJECTION, SOLUTION INTRAVENOUS; SUBCUTANEOUS at 09:05

## 2024-05-22 RX ADMIN — Medication 500 MG: at 12:05

## 2024-05-22 RX ADMIN — HEPARIN SODIUM 3000 UNITS: 1000 INJECTION, SOLUTION INTRAVENOUS; SUBCUTANEOUS at 07:05

## 2024-05-22 RX ADMIN — INSULIN DETEMIR 27 UNITS: 100 INJECTION, SOLUTION SUBCUTANEOUS at 12:05

## 2024-05-22 RX ADMIN — HEPARIN SODIUM 7500 UNITS: 5000 INJECTION INTRAVENOUS; SUBCUTANEOUS at 02:05

## 2024-05-22 NOTE — PLAN OF CARE
Problem: Infection  Goal: Absence of Infection Signs and Symptoms  Outcome: Progressing     Problem: Skin Injury Risk Increased  Goal: Skin Health and Integrity  Outcome: Progressing     Problem: Adult Inpatient Plan of Care  Goal: Plan of Care Review  Outcome: Progressing  Goal: Patient-Specific Goal (Individualized)  Outcome: Progressing  Goal: Absence of Hospital-Acquired Illness or Injury  Outcome: Progressing  Goal: Optimal Comfort and Wellbeing  Outcome: Progressing  Goal: Readiness for Transition of Care  Outcome: Progressing     Problem: Bariatric Environmental Safety  Goal: Safety Maintained with Care  Outcome: Progressing     Problem: Device-Related Complication Risk (Hemodialysis)  Goal: Safe, Effective Therapy Delivery  Outcome: Progressing     Problem: Hemodynamic Instability (Hemodialysis)  Goal: Effective Tissue Perfusion  Outcome: Progressing     Problem: Infection (Hemodialysis)  Goal: Absence of Infection Signs and Symptoms  Outcome: Progressing     Problem: Diabetes Comorbidity  Goal: Blood Glucose Level Within Targeted Range  Outcome: Progressing     Problem: Adjustment to Illness (Sepsis/Septic Shock)  Goal: Optimal Coping  Outcome: Progressing     Problem: Glycemic Control Impaired (Sepsis/Septic Shock)  Goal: Blood Glucose Level Within Desired Range  Outcome: Progressing     Problem: Infection Progression (Sepsis/Septic Shock)  Goal: Absence of Infection Signs and Symptoms  Outcome: Progressing

## 2024-05-22 NOTE — ASSESSMENT & PLAN NOTE
Pt is non-verbal and does not follow commands at baseline. Vascular Neurology consulted given acute change from baseline. MRI with concern for evolution of prior strokes with noted differential of T2 hyperintensities including vasculitis vs demyelination. Discussed with Vascular Neurology; low concern for ongoing vasculitis/demyelination, likely represents typical evolution of prior infarcts.    Patient at baseline as of 4/22.     Plan  RESOLVED  - Repeat head imaging if concern of new change in mental status/focal deficit

## 2024-05-22 NOTE — PROGRESS NOTES
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 History was given from patient's daughter. She was undergoing dialysis today and noticed she was lethargic, less alert than usual self. Pt completed dialysis and still not acting herself. EMS was called, fever of a 100.  On chart review, she did have an episode of large volume emesis around 1700.  Per EMS, she had a slightly elevated temp 100.0°F, glucose 300s.     In the ED: UA 2+ leuks, >100 WBC, many bacteria, WBC 17, CT abd/pelvis concerning for cystitis. Given vanc/zosyn    Overview/Hospital Course:  Patient was admitted to Hospital Medicine service for medical management and evaluation of urosepsis. Patient was continued on vanc/zosyn. Vascular Neurology consulted concerning mental status change with the recommendation to initiate ASA 81 QD and to obtain an MRI to r/o new stroke. Imaging pending. Nephrology was consulted for regularly scheduled dialysis. Afternoon of 4/12, patient was unable to tolerate filtration of volume during dialsis 2/2 hypotension. Patient received 500cc bolus and was transferred back to floor after finishing the session without volume removed. Will monitor for signs of volume overload. Tailoring insulin regimen while uptitrating tube feeds to goal rate. Staph Epi in all 4 bottles; ID with recommendation to continue Vanc & de-escalate meropenem to ertapenem on 04/15 through 4/16. Patient completed IV course of UTI coverage. Patient tolerated volume removal well in dialysis  throughout the rest of her hospital stay. Pending discharge to LTACH pending bed availability. Patient without BM with one episode of vomiting overnight 4/17. KUB with bowel gas and low concern for obstruction with no transition point noted. Escalating bowel regimen. Pending placement as of 4/22. Pulm consult placed to evaluate for possible trach downsize & eventual decannulation to assist placement if safe. Trach capping trial per pulm for 48h and will assess for decannulation on Monday. DVT studies negative. Pulm successfully decannulated pt 4/30, IR exchanged TDC. Pending swallow study with SLP and waiting for dispo options. Pt failed swallow study, placement pending. Working with PT on mobilize pt to sitting in a chair to expand placement options. Pt successfully mobilized using sandee lift but required 2 people to do so. Discussing dispo plan with family, likely d/c home if HD, DME needs able to be met. Pending acceptance at outpatient HD center. Ongoing placement difficulties d/t need for accepting HD facility to have sandee lift with 2 personnel to operate    Interval History: NAEON. Patient in dialysis this morning, no family present at bedside. Pending placement    Review of Systems   Unable to perform ROS: Patient nonverbal     Objective:     Vital Signs (Most Recent):  Temp: 99.9 °F (37.7 °C) (05/22/24 0433)  Pulse: 104 (05/22/24 0433)  Resp: 17 (05/22/24 0433)  BP: 109/74 (05/22/24 0433)  SpO2: 100 % (05/22/24 0433) Vital Signs (24h Range):  Temp:  [97.5 °F (36.4 °C)-99.9 °F (37.7 °C)] 99.9 °F (37.7 °C)  Pulse:  [] 104  Resp:  [17-18] 17  SpO2:  [94 %-100 %] 100 %  BP: (109-126)/(70-82) 109/74     Weight: 105.5 kg (232 lb 9.6 oz)  Body mass index is 36.43 kg/m².  No intake or output data in the 24 hours ending 05/22/24 0829      Physical Exam  Vitals and nursing note reviewed.   Constitutional:       General: She is not in acute distress.     Appearance: She is not toxic-appearing or diaphoretic.    HENT:      Head: Normocephalic and atraumatic.   Eyes:      General:         Right eye: No discharge.         Left eye: No discharge.      Conjunctiva/sclera: Conjunctivae normal.   Neck:      Comments: Decannulated  Cardiovascular:      Rate and Rhythm: Normal rate and regular rhythm.      Heart sounds: Normal heart sounds.   Pulmonary:      Effort: Pulmonary effort is normal. No respiratory distress.   Abdominal:      General: Abdomen is flat. There is no distension.      Tenderness: There is no abdominal tenderness.      Comments: PEG tube in place   Musculoskeletal:      Right lower leg: No edema.      Left lower leg: No edema.   Skin:     General: Skin is warm and dry.   Neurological:      General: No focal deficit present.      Mental Status: She is alert.      Comments: Nonverbal             Significant Labs: All pertinent labs within the past 24 hours have been reviewed.    Significant Imaging: I have reviewed all pertinent imaging results/findings within the past 24 hours.    Assessment/Plan:      * Acute cystitis with hematuria  resolved    Pt presenting with AMS and worsening lethargy. Pt is non-verbal at baseline, does not follow commands, but was less responsive than normal. Pt found to have a fever. Urinary source suspected based off of urine and CT abd/pelvis. Pt has many prior resistant bacterial infections including urinary sources. Has prior with sensitivity to zosyn and has also improved off ED dose of vanc/zosyn. Lactic elevated a 3.8->3.49 prior to completion of fluid bolus 500ml.    Patient with new fever and tachycardia on 4/25. Low threshold to initiate additional infectious workup and repeat UA.     Plan  S/p course of vanc/ertapenem per ID. Course completed 4/16.  Daily cbc  Contact precautions    *on contact precautions indefinitely while patient still makes urine given pt incontinent per infection control    Debility  Needs:  Wheelchair: patient has a mobility limitation that  significantly impairs her ability to participate in one or more mobility related activities of daily living (MRADL's) such as toileting, feeding, dressing, grooming, and bathing in customary locations in the home.  The mobility limitation cannot be sufficiently resolved by the use of a cane or walker.   The use of a manual wheelchair will significantly improve the patient's ability to participate in MRADLS and the patient will use it on regular basis in the home.  Family/pt has expressed her willingness to use a manual wheelchair in the home.  She also has a caregiver who is capable of assisting in mobility.    Mrs Saravia requires a hospital bed due to her requiring positioning of the body in ways not feasible with an ordinary bed to alleviate pain/ is completely immobile /or limited mobility and cannot independently make changes in body position without the use of the bed.  The positioning of the body cannot be sufficiently resolved by the use of pillows and wedges    Patient has a mobility limitation that significantly impairs their ability to participate in one or more mobility related activities of daily living, including toileting. This deficit can be resolved by using a bedside commode. Patient demonstrates mobility limitations that will cause them to be confined to one room at home without bathroom access for up to 30 days. Using a bedside commode will greatly improve the patient's ability to participate in MRADLs       Complication of vascular dialysis catheter  Cath intermittently clogging, not resolving with cath-hedy, going for IR venogram 4/30 with possible exchange. S/p exchange with IR on 4/30      Constipation  RESOLVED with Bowel regimen  Patient without BM x5d. One episode of vomitus night of 4/17. Some concern for bowel obstruction. Tolerating continuous tube feeds at goal rate with 0cc on residuals. Physical exam with bowel sounds, soft nontender abdomen. KUB without concern for obstruction with  bowel gas, lack of transition point.    Patient now with regular bowel movements on bowel regimen.     Plan  - Miralax BID, Senna BID  - compazine PRN    Moderate malnutrition  Nutrition consulted. Most recent weight and BMI monitored-     Measurements:  Wt Readings from Last 1 Encounters:   05/16/24 105.5 kg (232 lb 9.6 oz)   Body mass index is 36.43 kg/m².    Patient has been screened and assessed by RD.    Malnutrition Type:  Context: acute illness or injury  Level: moderate    Malnutrition Characteristic Summary:  Weight Loss (Malnutrition): 10% in 6 months  Subcutaneous Fat (Malnutrition): mild depletion  Muscle Mass (Malnutrition): mild depletion  Fluid Accumulation (Malnutrition): mild    Interventions/Recommendations (treatment strategy):  1.    -- TF  -- going for swallow study with SLP to assess possibility of pleasure oral feedings --> failed repeatedly    Prolonged QT interval  Qtc 526 [04/10/24]      limit Qtc prolonging drugs as able    Spastic hemiplegia of right dominant side as late effect of cerebrovascular disease  Important that patient is able to sit in chair for 4h for dialysis placement needs, even if she requires lift/assistance getting into the chair     This patient has Chronic right hemiplegia due to stroke. Physical therapy services has been scheduled. Continue all standard measures for pressure injury prevention and consult wound care for any wounds (chronic or acute).    ESRD (end stage renal disease) on dialysis  Creatine stable for now. BMP reviewed- noted Estimated Creatinine Clearance: 18.1 mL/min (A) (based on SCr of 4.5 mg/dL (H)). according to latest data. Based on current GFR, CKD stage is end stage.  Monitor UOP and serial BMP and adjust therapy as needed. Renally dose meds. Avoid nephrotoxic medications and procedures.    -- HD MWF  -- nephro following    Status post tracheostomy  Resolved, decannulated 4/30    Acute encephalopathy  Pt is non-verbal and does not follow  commands at baseline. Vascular Neurology consulted given acute change from baseline. MRI with concern for evolution of prior strokes with noted differential of T2 hyperintensities including vasculitis vs demyelination. Discussed with Vascular Neurology; low concern for ongoing vasculitis/demyelination, likely represents typical evolution of prior infarcts.    Patient at baseline as of 4/22.     Plan  RESOLVED  - Repeat head imaging if concern of new change in mental status/focal deficit    Type 2 diabetes mellitus with hyperglycemia, with long-term current use of insulin  Patient's FSGs are controlled on current medication regimen.  Last A1c reviewed-   Lab Results   Component Value Date    HGBA1C 10.4 (H) 01/30/2024     Most recent fingerstick glucose reviewed-   Recent Labs   Lab 05/21/24  1538 05/21/24  1818 05/21/24  2227 05/22/24  0010   POCTGLUCOSE 197* 164* 166* 167*       Current correctional scale  Medium  Maintain anti-hyperglycemic dose as follows-   Antihyperglycemics (From admission, onward)      Start     Stop Route Frequency Ordered    04/17/24 1200  insulin aspart U-100 pen 4 Units         -- SubQ Every 6 hours 04/17/24 0938    04/13/24 2100  insulin detemir U-100 (Levemir) pen 27 Units         -- SubQ 2 times daily 04/13/24 0931          Hold Oral hypoglycemics while patient is in the hospital.  POCT glucose q6h    Hyponatremia  Patient has hyponatremia which is uncontrolled,We will aim to correct the sodium by 4-6mEq in 24 hours. We will monitor sodium Daily. The hyponatremia is due to ESRD. Discussed with nephrology, dialysate bath adjusted to account for and correct hyponatremia.  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 Pt underwent extensive course, currently still healing from this, but no longer has wound vac. Pt's wound currently covered  with bandage. Picture in media tabs    Plan  Wound care        VTE Risk Mitigation (From admission, onward)           Ordered     heparin (porcine) injection 1,000 Units  As needed (PRN)         05/21/24 1017     heparin (porcine) injection 3,000 Units  As needed (PRN)         04/17/24 0825     heparin (porcine) injection 7,500 Units  Every 8 hours         04/11/24 0252                    Discharge Planning   ZEKE: 5/27/2024     Code Status: Full Code   Is the patient medically ready for discharge?: No    Reason for patient still in hospital (select all that apply): Pending disposition  Discharge Plan A: Skilled Nursing Facility                  Jarrett Eason DO  Department of Hospital Medicine   [facility] - Tele[facility] Stepdown

## 2024-05-22 NOTE — PROGRESS NOTES
[facility] - Telemetry Stepdown  Adult Nutrition  Progress Note    SUMMARY       Recommendations    Continue TF regimen of Novasource @ 40 mL/hr - meeting needs.  - If bolus TFs warranted for discharge, rec'd Novasource - 4 cans/day = 1900 kcals, 88 g of protein, 672 mL fluid.   RD to monitor & follow-up.    Goals: Meet % EEN, EPN by RD f/u date  Nutrition Goal Status: goal met  Communication of RD Recs: reviewed with RN    Assessment and Plan    Moderate malnutrition    Malnutrition Type:  Context: acute illness or injury  Level: moderate    Related to (etiology):   Inability to consume sufficient energy     Signs and Symptoms (as evidenced by):   Weight loss, NFPE, Edema    Malnutrition Characteristic Summary:  Weight Loss (Malnutrition): 10% in 6 months  Subcutaneous Fat (Malnutrition): mild depletion  Muscle Mass (Malnutrition): mild depletion  Fluid Accumulation (Malnutrition): mild    Interventions/Recommendations (treatment strategy):  Collaboration of nutrition care w/ other providers  EN    Nutrition Diagnosis Status:   Continues     Malnutrition Assessment    Malnutrition Context: acute illness or injury  Malnutrition Level: moderate    Weight Loss (Malnutrition): 10% in 6 months  Subcutaneous Fat (Malnutrition): mild depletion  Muscle Mass (Malnutrition): mild depletion  Fluid Accumulation (Malnutrition): mild     Reason for Assessment    Reason For Assessment: RD follow-up  Diagnosis: other (see comments) (Acute cystitis with hematuria)  Relevant Medical History: CVA, PEG, DM  Interdisciplinary Rounds: did not attend    General Information Comments: [facility] for HD - tolerating TFs (per RN) & remains NPO, per SLP. Moderate malnutrition diagnosis continues; please see PES statement for details.   Nutrition Discharge Planning: Adequate nutrition    Nutrition/Diet History    Spiritual, Cultural Beliefs, Rastafarian Practices, Values that Affect Care: no  Food Allergies: NKFA  Factors Affecting Nutritional  "Intake: NPO    Anthropometrics    Temp: 98 °F (36.7 °C)  Height Method: Stated  Height: 5' 7" (170.2 cm)  Height (inches): 67 in  Weight Method: Bed Scale  Weight: 105.5 kg (232 lb 9.4 oz)  Weight (lb): 232.59 lb  Ideal Body Weight (IBW), Female: 135 lb  % Ideal Body Weight, Female (lb): 172.29 %  BMI (Calculated): 36.4  BMI Grade: 35 - 39.9 - obesity - grade II  Usual Body Weight (UBW), kg: (!) 136.4 kg  % Usual Body Weight: 90  % Weight Change From Usual Weight: -10.19 %    Lab/Procedures/Meds    Pertinent Labs Reviewed: reviewed  Pertinent Labs Comments: Creat 5.9, GFR 8  Pertinent Medications Reviewed: reviewed  Pertinent Medications Comments: Psllium husk    Estimated/Assessed Needs    Weight Used For Calorie Calculations: 105.5 kg (232 lb 9.4 oz)    Energy Calorie Requirements (kcal): 2030 kcal/d  Energy Need Method: Capron-St Jeor (1.2 PAL)    Protein Requirements: 95 g/d (.9 g/kg)  Weight Used For Protein Calculations: 105.5 kg (232 lb 9.4 oz)    Estimated Fluid Requirement Method: other (see comments) (Per MD)  RDA Method (mL): 2030    CHO Requirement: 254g    Nutrition Prescription Ordered    Current Diet Order: NPO  Current Nutrition Support Formula Ordered: Novasource Renal  Current Nutrition Support Rate Ordered: 40 mL/hr    Evaluation of Received Nutrient/Fluid Intake    Enteral Calories (kcal): 1920  Enteral Protein (gm): 87  Enteral (Free Water) Fluid (mL): 688  Free Water Flush Fluid (mL): 400    % Kcal Needs: 95%  % Protein Needs: 92%    I/O: +4.5L since 5/8    Energy Calories Required: meeting needs  Protein Required: meeting needs  Fluid Required: other (see comments) (Per MD)    Comments: LBM: 5/20    Tolerance: tolerating    Nutrition Risk    Level of Risk/Frequency of Follow-up:  (1x/week)     Monitor and Evaluation    Food and Nutrient Intake: enteral nutrition intake  Food and Nutrient Adminstration: enteral and parenteral nutrition administration  Physical Activity and Function: " nutrition-related ADLs and IADLs  Anthropometric Measurements: weight, weight change  Biochemical Data, Medical Tests and Procedures: glucose/endocrine profile, lipid profile, inflammatory profile, gastrointestinal profile  Nutrition-Focused Physical Findings: overall appearance     Nutrition Follow-Up    RD Follow-up?: Yes

## 2024-05-22 NOTE — ASSESSMENT & PLAN NOTE
Patient has hyponatremia which is uncontrolled,We will aim to correct the sodium by 4-6mEq in 24 hours. We will monitor sodium Daily. The hyponatremia is due to ESRD. Discussed with nephrology, dialysate bath adjusted to account for and correct hyponatremia.  Recent Labs   Lab 05/21/24  0842   *     IMPROVING  - Daily morning CMP  - Continue to montior

## 2024-05-22 NOTE — PLAN OF CARE
[facility] - Tele[facility] Stepdown  Discharge Reassessment    Primary Care Provider: No, Primary Doctor    Expected Discharge Date: 5/27/2024    Reassessment (most recent)       Discharge Reassessment - 05/22/24 1535          Discharge Reassessment    Assessment Type Discharge Planning Reassessment     Did the patient's condition or plan change since previous assessment? No     Discharge Plan discussed with: Adult children     Communicated ZEKE with patient/caregiver Yes     Discharge Plan A Home Health     Discharge Plan B Skilled Nursing Facility     DME Needed Upon Discharge  other (see comments)   TBD    Transition of Care Barriers DIalysis placement issues        Post-Acute Status    Post-Acute Authorization Home Health     Diaylsis Status Referrals Sent                   Discharge Plan A and Plan B have been determined by review of patient's clinical status, future medical and therapeutic needs, and coverage/benefits for post-acute care in coordination with multidisciplinary team members.     Sara Loredo RN  Ext [phone]

## 2024-05-22 NOTE — PT/OT/SLP PROGRESS
"Occupational Therapy   Treatment    Co-treatment with PT due to pt's requiring 2 skilled therapists to safely perform mobility and to accommodate her activity tolerance    Name: Sarah Saravia  MRN: [id number]  Admitting Diagnosis:  Acute cystitis with hematuria       Recommendations:     Discharge Recommendations: Moderate Intensity Therapy  Discharge Equipment Recommendations:  wheelchair, hospital bed, lift device  Barriers to discharge:  Other (Comment) (increased skilled assistance with ADLs and mobility)    Assessment:     Sarah Saravia is a 53 y.o. female with a medical diagnosis of Acute cystitis with hematuria.  Pt tolerated session well and without incident, but she continues to require significant assistance with ADLs and  mobility.  She presents with the following. Performance deficits affecting function are weakness, impaired endurance, impaired self care skills, impaired functional mobility, gait instability, impaired balance, pain, decreased lower extremity function, decreased upper extremity function, decreased ROM, decreased safety awareness, decreased coordination.     Rehab Prognosis:  Fair; patient would benefit from acute skilled OT services to address these deficits and reach maximum level of function.       Plan:     Patient to be seen 3 x/week to address the above listed problems via self-care/home management, therapeutic activities, therapeutic exercises, neuromuscular re-education  Plan of Care Expires: 06/13/24  Plan of Care Reviewed with: patient, daughter    Subjective     Chief Complaint: L knee pain.    Patient/Family Comments/goals: "Yes." "Okay." - intermittent one-word responses  Pain/Comfort:  Pain Rating 1: other (see comments) (not rated; pt grabbed at L knee while seated EOB)  Location - Side 1: Left  Location - Orientation 1: generalized  Location 1: knee  Pain Addressed 1: Cessation of Activity, Distraction, Reposition  Pain Rating Post-Intervention 1: other (see comments) " (none verbalized at rest)    Objective:     Communicated with: nurse and PT prior to session.  Patient found HOB elevated with PEG Tube, Lexy (HD cath) with her daughter present upon OT entry to room.    General Precautions: Standard, fall, NPO, aspiration, aphasia, contact    Orthopedic Precautions:N/A  Braces: N/A  Respiratory Status: Room air     Occupational Performance:     Bed Mobility:    Patient completed Rolling/Turning to Right/Left with maximal assistance and 2 persons  Patient completed Scooting/Bridging to EOB to place her feet on the floor with maximal assistance and 2 persons  Patient completed Supine to Sit to the R with total assistance and 2 persons     Functional Mobility/Transfers:  Patient completed Sit <> Stand Transfer from EOB x 3 trials with total assistance and of 3 persons with hand-held assist and draw sheet and BLE blockled.  She stood for ~15 sec, 30 sec, and 45 sec, respectively.   Patient completed Bed <> Medi-Chair Transfer using Drawsheet and Slide Board technique with dependence and of 4 persons     Activities of Daily Living:  Feeding:  dependence PEG tube  Grooming: Writing therapist provided pt with a wash cloth while she sat EOB.  She was able to grasp it on command but was unable to wash her face on command.  Toileting: dependence PureWick to urinate  Pt spontaneously began to fasten the gait belt around her waist while seated EOB, needing assistance to tighten it.  She did not attempt to unfasten it on command.      [facility] Click ADL: 8    Treatment & Education:  - Pt sat EOB ~20 min.  She initially required Max A for sitting balance to correct her posterior lean.  Pt required cues to lean forward at her trunk and to place B hands next to her on the EOB.  She progressed to SBA.      - Attempted functional reach activity with pt.  She was not interested in grasping squeeze ball, but she immediately reached for her cell phone when her daughter brought it near her.  Pt  attempted to use her password to unlock her phone, but the password was incorrect. She later refused to reach for the iPad.      Pt and her daughter edu on role of OT, POC, safety when performing self care tasks, benefit of performing EOB/OOB activity, and safety when performing functional transfers and mobility.    - Self care tasks completed-- as noted above      Patient left up in medi-chair with all lines intact, call button in reach, nursing notified, and her daughter and nursing present    GOALS:   Multidisciplinary Problems       Occupational Therapy Goals          Problem: Occupational Therapy    Goal Priority Disciplines Outcome Interventions   Occupational Therapy Goal     OT, PT/OT Progressing    Description: Goals to be met by: 5/22/24 (extend to 6/19/2024)    Patient will increase functional independence with ADLs by performing:    UE Dressing with Maximum Assistance.  Grooming while EOB with Maximum Assistance.  Sitting at edge of bed x5 minutes with Maximum Assistance. - Met 5/22  Rolling to Bilateral with Moderate Assistance.   Supine to sit with Maximum Assistance.                         Time Tracking:     OT Date of Treatment: 05/22/24  OT Start Time: 1523  OT Stop Time: 1608  OT Total Time (min): 45 min    Billable Minutes:Therapeutic Activity 20 min  Neuromuscular Re-education 25 min    OT/JOSÉ: OT     Number of JOSÉ visits since last OT visit: 1    5/22/2024

## 2024-05-22 NOTE — PT/OT/SLP PROGRESS
Physical Therapy Co-Treatment    Patient Name:  Sarah Saravia   MRN:  [id number]    Recommendations:     Discharge Recommendations: Moderate Intensity Therapy  Discharge Equipment Recommendations: hospital bed, lift device, wheelchair  Barriers to discharge:  evolving clinical presentation    Assessment:   Co-treatment performed due to patient's multiple deficits requiring two skilled therapists to appropriately and safely assess patient's strength, endurance, functional mobility, and ADL performance while facilitating functional tasks in addition to accommodating for patient's activity tolerance and medical acuity.     Sarah Saravia is a 53 y.o. female admitted with a medical diagnosis of Acute cystitis with hematuria.  She presents with the following impairments/functional limitations: weakness, impaired endurance, impaired self care skills, impaired functional mobility, gait instability, impaired balance, decreased coordination, decreased upper extremity function, decreased lower extremity function, decreased safety awareness, pain, decreased ROM Pt showing an increase in tolerance to therapy as well as an increase with sit <> stand transitions; however, she still presents with limitations following commands. Pt requires max/total assistance with sit <> stand transitions, OOB/BTB transfers, and activity to prevent falls due to weakness, instability, general deconditioning, pain, fatigue, and decreased ROM. Pt continues to show interest and motivation with PT treatment sessions. In light of pt's current condition, it is recommended they participate in moderate intensity therapy program with PT and OT skilled services to gain maximum return of function. Pt can continue to benefit from skilled therapy to increase endurance, strength, mobility, ROM, tolerance and return to PLOF.       Rehab Prognosis: Fair; patient would benefit from acute skilled PT services to address these deficits and reach maximum level of  "function.    Recent Surgery: * No surgery found *      Plan:     During this hospitalization, patient to be seen 3 x/week to address the identified rehab impairments via therapeutic activities, therapeutic exercises, neuromuscular re-education and progress toward the following goals:    Plan of Care Expires:  05/22/24    Subjective     Chief Complaint: pt agreeable to therapy  Patient/Family Comments/goals: Daughter Aleks stated, "Mom, show them what you can do"  Pain/Comfort:  Pain Rating 1:  (no rating provided)  Location - Side 1: Bilateral  Location - Orientation 1: generalized  Location 1: knee  Pain Addressed 1: Reposition, Distraction, Cessation of Activity      Objective:     Communicated with nurse (Stacia) prior to session.  Patient found supine with blood pressure cuff, PEG Tube, telemetry, Trialysis, daughter present upon PT entry to room. Rehab Tech Blas assisting throughout session    General Precautions: Standard, aphasia, aspiration, fall, NPO, contact (ESBL/MDRO in urine)  Orthopedic Precautions: N/A  Braces: N/A  Respiratory Status: Room air     Functional Mobility:  Bed Mobility:     Rolling Left: max A x2, HOB flat, use of L rail  Rolling Right: max A x2, HOB flat, use of R rail  Scooting: anteriorly to the EOB max A via draw sheet  Supine to Sit: total A x2 for trunk elevation and LE clearance  Transfers:     Sit to Stand:  total A x3 persons x3 trials with B HHA + draw sheet around hips. Pt required B knee blocking for stability during transition. Pt able to maintain standing for 15sec, 30sec, and 45 sec with maximal vc and/or facilitation for upright posture, fwd gaze, hip extension, anterior WS, and WB through B LE.  Bed to Medi-Chair: dependent x4 persons with  slide board  using  Slide Board and Drawsheet  Balance: static sitting at the EOB x20min SBA; dynamic sitting balance at the EOB. Pt unable to complete target reaching on command but did so independently for objects of interest, " such as iPad and phone, when placed in front of her.      AM-PAC 6 CLICK MOBILITY  Turning over in bed (including adjusting bedclothes, sheets and blankets)?: 1  Sitting down on and standing up from a chair with arms (e.g., wheelchair, bedside commode, etc.): 1  Moving from lying on back to sitting on the side of the bed?: 2  Moving to and from a bed to a chair (including a wheelchair)?: 1  Need to walk in hospital room?: 1  Climbing 3-5 steps with a railing?: 1  Basic Mobility Total Score: 7       Treatment & Education:  Pt educated on the importance of getting OOB and spending time sitting on the EOB. Pt was educated on the effects of immobility and moving throughout the day to decrease recovery time and promote healing. Pt was instructed to call for hospital staff assistance when needed and not to get up without assistance.  Pt and family members educated on importance of calling for help to return to bed from medi-chair.      Patient left up in chair with all lines intact, call button in reach, nurse notified, and nurse, PCT, and daughter present..    GOALS:   Multidisciplinary Problems       Physical Therapy Goals          Problem: Physical Therapy    Goal Priority Disciplines Outcome Goal Variances Interventions   Physical Therapy Goal     PT, PT/OT Progressing     Description: Goals to be met by: 24   Goals remain appropriate 5/3/2024 to be met by 24  Goals updated 2024 to be met by 24    Patient will increase functional independence with mobility by performin. Supine to sit with Maximum Assistance  2. Sit to supine with Maximum Assistance  3. Rolling to Left and Right with Moderate Assistance.  4. Sitting at edge of bed 5 minutes with Moderate Assistance- MET , monitor consistency  5. Lower extremity exercise program x20 reps per handout, with assistance as needed                         Time Tracking:     PT Received On: 24  PT Start Time: 4     PT Stop Time:  1607  PT Total Time (min): 43 min     Billable Minutes: Therapeutic Activity 27 and Neuromuscular Re-education 16    Treatment Type: Treatment  PT/PTA: PT     Number of PTA visits since last PT visit: 0     05/22/2024

## 2024-05-22 NOTE — ASSESSMENT & PLAN NOTE
Patient's FSGs are controlled on current medication regimen.  Last A1c reviewed-   Lab Results   Component Value Date    HGBA1C 10.4 (H) 01/30/2024     Most recent fingerstick glucose reviewed-   Recent Labs   Lab 05/21/24  1538 05/21/24  1818 05/21/24  2227 05/22/24  0010   POCTGLUCOSE 197* 164* 166* 167*       Current correctional scale  Medium  Maintain anti-hyperglycemic dose as follows-   Antihyperglycemics (From admission, onward)    Start     Stop Route Frequency Ordered    04/17/24 1200  insulin aspart U-100 pen 4 Units         -- SubQ Every 6 hours 04/17/24 0938    04/13/24 2100  insulin detemir U-100 (Levemir) pen 27 Units         -- SubQ 2 times daily 04/13/24 0931        Hold Oral hypoglycemics while patient is in the hospital.  POCT glucose q6h

## 2024-05-22 NOTE — PT/OT/SLP PROGRESS
"Speech Language Pathology Treatment    Patient Name:  Sarah Saravia   MRN:  [id number]  Admitting Diagnosis: Acute cystitis with hematuria    Recommendations:                            General Recommendations:  Dysphagia therapy and Speech/language therapy  Diet recommendations:  NPO, Liquid Diet Level: NPO Pt may receive thin water for comfort/pleasure.  Aspiration Precautions: Continue alternate means of nutrition, HOB to 90 degrees, Monitor for s/s of aspiration, and Strict aspiration precautions   General Precautions: Standard, aphasia, aspiration, fall, NPO  Communication strategies:  yes/no questions only and go to room if call light pushed       Assessment:     Sarah Saravia is a 53 y.o. female with an SLP diagnosis of Aphasia, Dysphagia, and Cognitive-Linguistic Impairment.      Subjective     Pt was non-speaking. She was awake/alert and agreeable to ST session via head nod.      Pain/Comfort:  Pain Rating 1: 0/10  Pain Rating Post-Intervention 1: 0/10    Respiratory Status: Room air    Objective:     Has the patient been evaluated by SLP for swallowing?   Yes  Keep patient NPO? Yes          Pt seen for ongoing dysphagia and speech therapy. Pt answered a few yes/no questions about her environment via head nods. Pt with a preference for head nod and "yes" when asked simple yes/no questions. No vocalizations noted. Pt did not follow simple commands despite max cues provided. HOB elevated for safety precautions. Pt seen with ~4 oz of water via straw sips and small bites of pudding x4. Pt with adequate siphoning from the straw and timely initiation of the pharyngeal swallow with sips of water. Oral holding observed with PO trials of puree (~15 seconds prior to initiating a pharyngeal swallow). Given max cues, dry spoon presentations, and straw presentations, pt initiated a pharyngeal swallow to clear bites of pudding. Multiple swallows noted with bites of puree. No overt signs of aspiration noted with puree " or thin liquids. Unable to assess vocal quality 2/2 pt is non-speaking. Pt should remain NPO with sips of water via straw for pleasure. Unable to recommend puree for pleasure due to oral holding, inability to assess vocal quality, and poor command following. Pt would benefit from ongoing speech therapy services to address dysphagia and cognitive-linguistic deficits. Pt remained upright in bed with all needs met and no report of pain upon SLP exit.     Goals:   Multidisciplinary Problems       SLP Goals          Problem: SLP    Goal Priority Disciplines Outcome   SLP Goal     SLP    Description: Speech Language Pathology Goals  Updated goals expected to be met by 5/10 (goals remain appropriate 5/17 - reassess on 5/24:  1. Pt will participate in ongoing swallowing assessment to determine if appropriate for PO trials for pleasure.   2. Pt will model single step command x 1 given max cues.   3. Pt will answer simple yes/no q's during a structured receptive language task x 1 given max cues.   4. Pt will phonate purposefully upon command x 1 given max cues.   5. Pt will attempt to vocalize/verbalize during automatic speech task x 1 given max cues.   6. Pt will participate in evaluation of ability to utilize basic communication board.     Goals expected to be met by 5/8:  1. Pt will participate in Modified Barium Swallow Study to determine if safe for oral intake. Goal met/attempted 5/2                               Plan:     Patient to be seen:  3 x/week   Plan of Care expires:  05/31/24  Plan of Care reviewed with:  patient   SLP Follow-Up:  Yes       Discharge recommendations:   (continue SLP services upon d/c from hospital)     Time Tracking:     SLP Treatment Date:   05/22/24  Speech Start Time:  1420  Speech Stop Time:  1432     Speech Total Time (min):  12 min    Billable Minutes: Treatment Swallowing Dysfunction 14    05/22/2024    Aristides Guillermo CF-SLP

## 2024-05-22 NOTE — PLAN OF CARE
Problem: Occupational Therapy  Goal: Occupational Therapy Goal  Description: Goals to be met by: 5/22/24 (extend to 6/19/2024)    Patient will increase functional independence with ADLs by performing:    UE Dressing with Maximum Assistance.  Grooming while EOB with Maximum Assistance.  Sitting at edge of bed x5 minutes with Maximum Assistance. - Met 5/22  Rolling to Bilateral with Moderate Assistance.   Supine to sit with Maximum Assistance.    Outcome: Progressing     OT goals extended.  Continue POC.

## 2024-05-22 NOTE — PROGRESS NOTES
I have reviewed the notes, assessments, and/or procedures performed by the resident, I concur with her/his documentation of Sarah Saravia.  Date of Service: 4/10/2024

## 2024-05-22 NOTE — PROGRESS NOTES
Patient arrived by bed.  Awake, alert and responsive.  Vital signs stable.  HD 1;1    started via RIJ CVC.  Isolation precautions maintained.

## 2024-05-22 NOTE — NURSING
..Nurses Note -- 4 Eyes      5/21/2024 2000 PM      Skin assessed during: Q Shift Change      [] No Altered Skin Integrity Present    []Prevention Measures Documented      [x] Yes- Altered Skin Integrity Present or Discovered   [] LDA Added if Not in Epic (Describe Wound)   [] New Altered Skin Integrity was Present on Admit and Documented in LDA   [] Wound Image Taken    Wound Care Consulted? Yes    Attending Nurse:  Pk Eastman RN/Staff Member:  YANICK

## 2024-05-22 NOTE — PROGRESS NOTES
[facility]FF HEMODIALYSIS NOTE     Patient currently on hemodialysis for removal of uremic toxins and volume.     Patient seen and evaluated on hemodialysis, tolerating treatment, see HD flowsheet for vitals and assessments.    Labs have been reviewed and the dialysate bath has been adjusted.       Assessment/Plan:    -Pending placement   -Patient seen on HD, tolerating treatment well, w/o complaints   -UF goal of 2L  -Renal diet, if not NPO   -Strict I/O's and daily weights  -Daily renal function panels  -Keep MAP >65 while on HD   -Hgb goal 10-11, continue epo   -Will continue to follow while inpatient     Delfina Shi DNP-FNP, C  Nephrology  Pager: [phone]

## 2024-05-22 NOTE — PT/OT/SLP PROGRESS
Speech Language Pathology      Sarah NADER Saravia  MRN: [id number]    Patient not seen today secondary to dialysis. Another SLP may be able to re-attempt to see pt later today upon return from HD.  Otherwise, will follow up according to POC.

## 2024-05-22 NOTE — SUBJECTIVE & OBJECTIVE
Interval History: NAEON. Patient in dialysis this morning, no family present at bedside. Pending placement    Review of Systems   Unable to perform ROS: Patient nonverbal     Objective:     Vital Signs (Most Recent):  Temp: 99.9 °F (37.7 °C) (05/22/24 0433)  Pulse: 104 (05/22/24 0433)  Resp: 17 (05/22/24 0433)  BP: 109/74 (05/22/24 0433)  SpO2: 100 % (05/22/24 0433) Vital Signs (24h Range):  Temp:  [97.5 °F (36.4 °C)-99.9 °F (37.7 °C)] 99.9 °F (37.7 °C)  Pulse:  [] 104  Resp:  [17-18] 17  SpO2:  [94 %-100 %] 100 %  BP: (109-126)/(70-82) 109/74     Weight: 105.5 kg (232 lb 9.6 oz)  Body mass index is 36.43 kg/m².  No intake or output data in the 24 hours ending 05/22/24 0829      Physical Exam  Vitals and nursing note reviewed.   Constitutional:       General: She is not in acute distress.     Appearance: She is not toxic-appearing or diaphoretic.   HENT:      Head: Normocephalic and atraumatic.   Eyes:      General:         Right eye: No discharge.         Left eye: No discharge.      Conjunctiva/sclera: Conjunctivae normal.   Neck:      Comments: Decannulated  Cardiovascular:      Rate and Rhythm: Normal rate and regular rhythm.      Heart sounds: Normal heart sounds.   Pulmonary:      Effort: Pulmonary effort is normal. No respiratory distress.   Abdominal:      General: Abdomen is flat. There is no distension.      Tenderness: There is no abdominal tenderness.      Comments: PEG tube in place   Musculoskeletal:      Right lower leg: No edema.      Left lower leg: No edema.   Skin:     General: Skin is warm and dry.   Neurological:      General: No focal deficit present.      Mental Status: She is alert.      Comments: Nonverbal             Significant Labs: All pertinent labs within the past 24 hours have been reviewed.    Significant Imaging: I have reviewed all pertinent imaging results/findings within the past 24 hours.

## 2024-05-22 NOTE — PROGRESS NOTES
I have reviewed the notes, assessments, and/or procedures performed by the resident, I concur with her/his documentation of Sarah Saravia.  Date of Service: 4/10/2024    Plan for family meeting. Further denials from facilities. Disposition unclear but medically ready for discharge.

## 2024-05-23 LAB
POCT GLUCOSE: 160 MG/DL (ref 70–110)
POCT GLUCOSE: 162 MG/DL (ref 70–110)
POCT GLUCOSE: 171 MG/DL (ref 70–110)
POCT GLUCOSE: 179 MG/DL (ref 70–110)
POCT GLUCOSE: 181 MG/DL (ref 70–110)
POCT GLUCOSE: 198 MG/DL (ref 70–110)

## 2024-05-23 PROCEDURE — 25000003 PHARM REV CODE 250

## 2024-05-23 PROCEDURE — 20600001 HC STEP DOWN PRIVATE ROOM

## 2024-05-23 PROCEDURE — 63600175 PHARM REV CODE 636 W HCPCS: Performed by: STUDENT IN AN ORGANIZED HEALTH CARE EDUCATION/TRAINING PROGRAM

## 2024-05-23 PROCEDURE — 99232 SBSQ HOSP IP/OBS MODERATE 35: CPT | Mod: ,,, | Performed by: NURSE PRACTITIONER

## 2024-05-23 PROCEDURE — 27000207 HC ISOLATION

## 2024-05-23 PROCEDURE — 63600175 PHARM REV CODE 636 W HCPCS

## 2024-05-23 PROCEDURE — 25000242 PHARM REV CODE 250 ALT 637 W/ HCPCS

## 2024-05-23 RX ORDER — SODIUM CHLORIDE 9 MG/ML
INJECTION, SOLUTION INTRAVENOUS ONCE
Status: COMPLETED | OUTPATIENT
Start: 2024-05-24 | End: 2024-05-24

## 2024-05-23 RX ADMIN — ZINC SULFATE 220 MG (50 MG) CAPSULE 220 MG: CAPSULE at 10:05

## 2024-05-23 RX ADMIN — METOPROLOL TARTRATE 25 MG: 25 TABLET, FILM COATED ORAL at 09:05

## 2024-05-23 RX ADMIN — Medication 500 MG: at 09:05

## 2024-05-23 RX ADMIN — INSULIN ASPART 4 UNITS: 100 INJECTION, SOLUTION INTRAVENOUS; SUBCUTANEOUS at 12:05

## 2024-05-23 RX ADMIN — METOPROLOL TARTRATE 25 MG: 25 TABLET, FILM COATED ORAL at 10:05

## 2024-05-23 RX ADMIN — ATORVASTATIN CALCIUM 40 MG: 40 TABLET, FILM COATED ORAL at 10:05

## 2024-05-23 RX ADMIN — HEPARIN SODIUM 7500 UNITS: 5000 INJECTION INTRAVENOUS; SUBCUTANEOUS at 01:05

## 2024-05-23 RX ADMIN — PANTOPRAZOLE SODIUM 40 MG: 40 GRANULE, DELAYED RELEASE ORAL at 10:05

## 2024-05-23 RX ADMIN — HEPARIN SODIUM 7500 UNITS: 5000 INJECTION INTRAVENOUS; SUBCUTANEOUS at 06:05

## 2024-05-23 RX ADMIN — INSULIN ASPART 4 UNITS: 100 INJECTION, SOLUTION INTRAVENOUS; SUBCUTANEOUS at 05:05

## 2024-05-23 RX ADMIN — INSULIN ASPART 4 UNITS: 100 INJECTION, SOLUTION INTRAVENOUS; SUBCUTANEOUS at 06:05

## 2024-05-23 RX ADMIN — INSULIN DETEMIR 27 UNITS: 100 INJECTION, SOLUTION SUBCUTANEOUS at 10:05

## 2024-05-23 RX ADMIN — PSYLLIUM HUSK 1 PACKET: 3.4 POWDER ORAL at 10:05

## 2024-05-23 RX ADMIN — Medication 500 MG: at 10:05

## 2024-05-23 RX ADMIN — HEPARIN SODIUM 7500 UNITS: 5000 INJECTION INTRAVENOUS; SUBCUTANEOUS at 09:05

## 2024-05-23 RX ADMIN — INSULIN DETEMIR 27 UNITS: 100 INJECTION, SOLUTION SUBCUTANEOUS at 09:05

## 2024-05-23 RX ADMIN — ASPIRIN 81 MG CHEWABLE TABLET 81 MG: 81 TABLET CHEWABLE at 10:05

## 2024-05-23 NOTE — SUBJECTIVE & OBJECTIVE
Interval History:   HD yesterday with 2 L removed. No distress on exam. Meeting today for discharge planning.     Review of patient's allergies indicates:  No Known Allergies  Current Facility-Administered Medications   Medication Frequency    [START ON 5/24/2024] 0.9%  NaCl infusion Once    acetaminophen oral solution 650 mg Q6H PRN    ascorbic acid (vitamin C) tablet 500 mg BID    aspirin chewable tablet 81 mg Daily    atorvastatin tablet 40 mg Daily    dextrose 10 % infusion Continuous PRN    dextrose 10% bolus 125 mL 125 mL PRN    dextrose 10% bolus 250 mL 250 mL PRN    epoetin merry injection 6,100 Units Every Mon, Wed, Fri    glucagon (human recombinant) injection 1 mg PRN    glucose chewable tablet 16 g PRN    glucose chewable tablet 24 g PRN    heparin (porcine) injection 1,000 Units PRN    heparin (porcine) injection 3,000 Units PRN    heparin (porcine) injection 7,500 Units Q8H    hepatitis B virus vacc.rec(PF) injection 20 mcg vaccine x 1 dose    insulin aspart U-100 pen 4 Units Q6H    insulin detemir U-100 (Levemir) pen 27 Units BID    metoprolol tartrate (LOPRESSOR) tablet 25 mg BID    ondansetron 4 mg/5 mL solution 4 mg Q6H PRN    pantoprazole suspension 40 mg Daily    psyllium husk (aspartame) 3.4 gram packet 1 packet Daily    sodium chloride 0.9% bolus 250 mL 250 mL PRN    sodium chloride 0.9% flush 10 mL Q12H PRN    zinc sulfate capsule 220 mg Daily       Objective:     Vital Signs (Most Recent):  Temp: 98.8 °F (37.1 °C) (05/23/24 0759)  Pulse: 90 (05/23/24 0759)  Resp: 16 (05/23/24 0759)  BP: (!) 151/92 (05/23/24 0759)  SpO2: 99 % (05/23/24 0759) Vital Signs (24h Range):  Temp:  [98.4 °F (36.9 °C)-99.8 °F (37.7 °C)] 98.8 °F (37.1 °C)  Pulse:  [] 90  Resp:  [16-20] 16  SpO2:  [96 %-100 %] 99 %  BP: (101-151)/(72-92) 151/92     Weight: 105.5 kg (232 lb 9.4 oz) (05/22/24 1019)  Body mass index is 36.43 kg/m².  Body surface area is 2.23 meters squared.    I/O last 3 completed shifts:  In: 500  "[Other:300; NG/GT:200]  Out: 2600 [Other:2600]     Physical Exam  Vitals and nursing note reviewed.   HENT:      Head: Normocephalic and atraumatic.   Neck:      Comments: Trach removed  Cardiovascular:      Rate and Rhythm: Normal rate and regular rhythm.      Heart sounds: Normal heart sounds.   Pulmonary:      Effort: Pulmonary effort is normal. No respiratory distress.      Breath sounds: Normal breath sounds.   Abdominal:      General: Abdomen is flat. There is no distension.      Palpations: Abdomen is soft.      Comments: PEG tube   Musculoskeletal:      Right lower leg: No edema.      Left lower leg: No edema.      Comments: Moves UEs spontaneously   Skin:     General: Skin is warm and dry.   Neurological:      General: No focal deficit present.      Mental Status: She is alert.      Comments: Nonverbal          Significant Labs:  CBC: No results for input(s): "WBC", "RBC", "HGB", "HCT", "PLT", "MCV", "MCH", "MCHC" in the last 168 hours.  CMP:   Recent Labs   Lab 05/22/24  0545   *   CALCIUM 10.4   ALBUMIN 3.1*   *   K 4.1   CO2 23   CL 92*   BUN 56*   CREATININE 5.9*     All labs within the past 24 hours have been reviewed.     "

## 2024-05-23 NOTE — PLAN OF CARE
The team which included this CM, CM Supervisor Tiera, and Dr. Erickson met with patient's daughter Aleks Duran to discuss discharge planning. Ms. Duran was informed that as of today we have been unable to secure a dialysis chair at this time. We discussed that if patient was to discharge home without a dialysis chair being arranged she would need to come to the hospital when her symptoms warranted that she might  need dialysis. It was explained that she would not be on a regular dialysis schedule and if she arrived to the ED and her labs did not require her to receive dialysis she would be sent home without receiving dialysis.. Another option that was discussed was going home with hospice. Ms. Duran stated that she was not interested in hospice services at this time. The team then mentioned sending a referral to [facility] as they offer dialysis services at their facility. We explained that skilled services may not be available to her mother and discussed the possibility of admitting as a halfway.patient. She was informed that if her mother would need to be admitted as a halfway patient then she would need to provide financial information. Ms. Duran was informed that we could send a referral but there was no guarantee of there being  a bed available and if they would accept her mother. She expressed understanding and stated that a referral could be sent to [facility]. Will continue to follow.    Sara Loredo RN  Ext [phone]

## 2024-05-23 NOTE — ASSESSMENT & PLAN NOTE
"Patient has hyponatremia which is uncontrolled,We will aim to correct the sodium by 4-6mEq in 24 hours. We will monitor sodium Daily. The hyponatremia is due to ESRD. Discussed with nephrology, dialysate bath adjusted to account for and correct hyponatremia.  No results for input(s): "NA" in the last 24 hours.  IMPROVING  - Daily morning CMP  - Continue to montior  "

## 2024-05-23 NOTE — NURSING
.Nurses Note -- 4 Eyes      5/22/2024   7:23 PM      Skin assessed during: Q Shift Change      [] No Altered Skin Integrity Present    []Prevention Measures Documented      [x] Yes- Altered Skin Integrity Present or Discovered   [] LDA Added if Not in Epic (Describe Wound)   [] New Altered Skin Integrity was Present on Admit and Documented in LDA   [] Wound Image Taken    Wound Care Consulted? Yes    Attending Nurse:  Pk Eastman RN/Staff Member:  Stacia

## 2024-05-23 NOTE — SUBJECTIVE & OBJECTIVE
Interval History: NAEON. Pending family meeting to determine further dispo plans      Objective:     Vital Signs (Most Recent):  Temp: 98.6 °F (37 °C) (05/23/24 1158)  Pulse: 90 (05/23/24 1158)  Resp: 18 (05/23/24 1158)  BP: (!) 125/93 (05/23/24 1158)  SpO2: 100 % (05/23/24 1158) Vital Signs (24h Range):  Temp:  [98.4 °F (36.9 °C)-99.8 °F (37.7 °C)] 98.6 °F (37 °C)  Pulse:  [] 90  Resp:  [16-20] 18  SpO2:  [96 %-100 %] 100 %  BP: (101-151)/(73-93) 125/93     Weight: 105.5 kg (232 lb 9.4 oz)  Body mass index is 36.43 kg/m².    Intake/Output Summary (Last 24 hours) at 5/23/2024 1312  Last data filed at 5/23/2024 0745  Gross per 24 hour   Intake 200 ml   Output --   Net 200 ml         Physical Exam  Vitals and nursing note reviewed.   Constitutional:       General: She is not in acute distress.     Appearance: She is not toxic-appearing or diaphoretic.   HENT:      Head: Normocephalic and atraumatic.   Eyes:      General:         Right eye: No discharge.         Left eye: No discharge.      Conjunctiva/sclera: Conjunctivae normal.   Neck:      Comments: Decannulated  Cardiovascular:      Rate and Rhythm: Normal rate and regular rhythm.      Heart sounds: Normal heart sounds.   Pulmonary:      Effort: Pulmonary effort is normal. No respiratory distress.   Abdominal:      General: Abdomen is flat. There is no distension.      Tenderness: There is no abdominal tenderness.      Comments: PEG tube in place   Musculoskeletal:      Right lower leg: No edema.      Left lower leg: No edema.   Skin:     General: Skin is warm and dry.   Neurological:      General: No focal deficit present.      Mental Status: She is alert.      Comments: Nonverbal             Significant Labs: All pertinent labs within the past 24 hours have been reviewed.    Significant Imaging: I have reviewed all pertinent imaging results/findings within the past 24 hours.

## 2024-05-23 NOTE — NURSING
Nurses Note -- 4 Eyes      5/23/2024   12:37 PM      Skin assessed during: Q Shift Change      [] No Altered Skin Integrity Present    []Prevention Measures Documented      [x] Yes- Altered Skin Integrity Present or Discovered   [] LDA Added if Not in Epic (Describe Wound)   [] New Altered Skin Integrity was Present on Admit and Documented in LDA   [] Wound Image Taken    Wound Care Consulted? Yes    Attending Nurse:  ELISABETH Andrews    Second RN/Staff Member:  ELISABETH Whittington

## 2024-05-23 NOTE — ASSESSMENT & PLAN NOTE
Patient's FSGs are controlled on current medication regimen.  Last A1c reviewed-   Lab Results   Component Value Date    HGBA1C 10.4 (H) 01/30/2024     Most recent fingerstick glucose reviewed-   Recent Labs   Lab 05/22/24 2056 05/23/24  0018 05/23/24  0611 05/23/24  0853   POCTGLUCOSE 196* 181* 179* 198*       Current correctional scale  Medium  Maintain anti-hyperglycemic dose as follows-   Antihyperglycemics (From admission, onward)    Start     Stop Route Frequency Ordered    04/17/24 1200  insulin aspart U-100 pen 4 Units         -- SubQ Every 6 hours 04/17/24 0938    04/13/24 2100  insulin detemir U-100 (Levemir) pen 27 Units         -- SubQ 2 times daily 04/13/24 0931        Hold Oral hypoglycemics while patient is in the hospital.  POCT glucose q6h

## 2024-05-23 NOTE — ASSESSMENT & PLAN NOTE
Nutrition consulted. Most recent weight and BMI monitored-     Measurements:  Wt Readings from Last 1 Encounters:   05/22/24 105.5 kg (232 lb 9.4 oz)   Body mass index is 36.43 kg/m².    Patient has been screened and assessed by RD.    Malnutrition Type:  Context: acute illness or injury  Level: moderate    Malnutrition Characteristic Summary:  Weight Loss (Malnutrition): 10% in 6 months  Subcutaneous Fat (Malnutrition): mild depletion  Muscle Mass (Malnutrition): mild depletion  Fluid Accumulation (Malnutrition): mild    Interventions/Recommendations (treatment strategy):  1.    -- TF  -- going for swallow study with SLP to assess possibility of pleasure oral feedings --> failed repeatedly

## 2024-05-23 NOTE — PROGRESS NOTES
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 History was given from patient's daughter. She was undergoing dialysis today and noticed she was lethargic, less alert than usual self. Pt completed dialysis and still not acting herself. EMS was called, fever of a 100.  On chart review, she did have an episode of large volume emesis around 1700.  Per EMS, she had a slightly elevated temp 100.0°F, glucose 300s.     In the ED: UA 2+ leuks, >100 WBC, many bacteria, WBC 17, CT abd/pelvis concerning for cystitis. Given vanc/zosyn    Overview/Hospital Course:  Patient was admitted to Hospital Medicine service for medical management and evaluation of urosepsis. Patient was continued on vanc/zosyn. Vascular Neurology consulted concerning mental status change with the recommendation to initiate ASA 81 QD and to obtain an MRI to r/o new stroke. Imaging pending. Nephrology was consulted for regularly scheduled dialysis. Afternoon of 4/12, patient was unable to tolerate filtration of volume during dialsis 2/2 hypotension. Patient received 500cc bolus and was transferred back to floor after finishing the session without volume removed. Will monitor for signs of volume overload. Tailoring insulin regimen while uptitrating tube feeds to goal rate. Staph Epi in all 4 bottles; ID with recommendation to continue Vanc & de-escalate meropenem to ertapenem on 04/15 through 4/16. Patient completed IV course of UTI coverage. Patient tolerated volume removal well in dialysis  throughout the rest of her hospital stay. Pending discharge to LTACH pending bed availability. Patient without BM with one episode of vomiting overnight 4/17. KUB with bowel gas and low concern for obstruction with no transition point noted. Escalating bowel regimen. Pending placement as of 4/22. Pulm consult placed to evaluate for possible trach downsize & eventual decannulation to assist placement if safe. Trach capping trial per pulm for 48h and will assess for decannulation on Monday. DVT studies negative. Pulm successfully decannulated pt 4/30, IR exchanged TDC. Pending swallow study with SLP and waiting for dispo options. Pt failed swallow study, placement pending. Working with PT on mobilize pt to sitting in a chair to expand placement options. Pt successfully mobilized using sandee lift but required 2 people to do so. Discussing dispo plan with family, likely d/c home if HD, DME needs able to be met. Pending acceptance at outpatient HD center. Ongoing placement difficulties d/t need for accepting HD facility to have sandee lift with 2 personnel to operate. Family meeting to discuss disposition options planned for Thursday 5/23 at 1 PM.     Interval History: NAEON. Pending family meeting to determine further dispo plans      Objective:     Vital Signs (Most Recent):  Temp: 98.6 °F (37 °C) (05/23/24 1158)  Pulse: 90 (05/23/24 1158)  Resp: 18 (05/23/24 1158)  BP: (!) 125/93 (05/23/24 1158)  SpO2: 100 % (05/23/24 1158) Vital Signs (24h Range):  Temp:  [98.4 °F (36.9 °C)-99.8 °F (37.7 °C)] 98.6 °F (37 °C)  Pulse:  [] 90  Resp:  [16-20] 18  SpO2:  [96 %-100 %] 100 %  BP: (101-151)/(73-93) 125/93     Weight: 105.5 kg (232 lb 9.4 oz)  Body mass index is 36.43 kg/m².    Intake/Output Summary (Last 24 hours) at 5/23/2024 1312  Last data filed at 5/23/2024 0745  Gross per 24 hour   Intake 200 ml   Output --   Net 200 ml         Physical Exam  Vitals and nursing note reviewed.   Constitutional:       General: She is  not in acute distress.     Appearance: She is not toxic-appearing or diaphoretic.   HENT:      Head: Normocephalic and atraumatic.   Eyes:      General:         Right eye: No discharge.         Left eye: No discharge.      Conjunctiva/sclera: Conjunctivae normal.   Neck:      Comments: Decannulated  Cardiovascular:      Rate and Rhythm: Normal rate and regular rhythm.      Heart sounds: Normal heart sounds.   Pulmonary:      Effort: Pulmonary effort is normal. No respiratory distress.   Abdominal:      General: Abdomen is flat. There is no distension.      Tenderness: There is no abdominal tenderness.      Comments: PEG tube in place   Musculoskeletal:      Right lower leg: No edema.      Left lower leg: No edema.   Skin:     General: Skin is warm and dry.   Neurological:      General: No focal deficit present.      Mental Status: She is alert.      Comments: Nonverbal             Significant Labs: All pertinent labs within the past 24 hours have been reviewed.    Significant Imaging: I have reviewed all pertinent imaging results/findings within the past 24 hours.    Assessment/Plan:      * Acute cystitis with hematuria  resolved    Pt presenting with AMS and worsening lethargy. Pt is non-verbal at baseline, does not follow commands, but was less responsive than normal. Pt found to have a fever. Urinary source suspected based off of urine and CT abd/pelvis. Pt has many prior resistant bacterial infections including urinary sources. Has prior with sensitivity to zosyn and has also improved off ED dose of vanc/zosyn. Lactic elevated a 3.8->3.49 prior to completion of fluid bolus 500ml.    Patient with new fever and tachycardia on 4/25. Low threshold to initiate additional infectious workup and repeat UA.     Plan  S/p course of vanc/ertapenem per ID. Course completed 4/16.  Daily cbc  Contact precautions    *on contact precautions indefinitely while patient still makes urine given pt incontinent per infection  control    Debility  Needs:  Wheelchair: patient has a mobility limitation that significantly impairs her ability to participate in one or more mobility related activities of daily living (MRADL's) such as toileting, feeding, dressing, grooming, and bathing in customary locations in the home.  The mobility limitation cannot be sufficiently resolved by the use of a cane or walker.   The use of a manual wheelchair will significantly improve the patient's ability to participate in MRADLS and the patient will use it on regular basis in the home.  Family/pt has expressed her willingness to use a manual wheelchair in the home.  She also has a caregiver who is capable of assisting in mobility.    Mrs Saravia requires a hospital bed due to her requiring positioning of the body in ways not feasible with an ordinary bed to alleviate pain/ is completely immobile /or limited mobility and cannot independently make changes in body position without the use of the bed.  The positioning of the body cannot be sufficiently resolved by the use of pillows and wedges    Patient has a mobility limitation that significantly impairs their ability to participate in one or more mobility related activities of daily living, including toileting. This deficit can be resolved by using a bedside commode. Patient demonstrates mobility limitations that will cause them to be confined to one room at home without bathroom access for up to 30 days. Using a bedside commode will greatly improve the patient's ability to participate in MRADLs       Complication of vascular dialysis catheter  Cath intermittently clogging, not resolving with cath-hedy, going for IR venogram 4/30 with possible exchange. S/p exchange with IR on 4/30      Constipation  RESOLVED with Bowel regimen  Patient without BM x5d. One episode of vomitus night of 4/17. Some concern for bowel obstruction. Tolerating continuous tube feeds at goal rate with 0cc on residuals. Physical exam with  bowel sounds, soft nontender abdomen. KUB without concern for obstruction with bowel gas, lack of transition point.    Patient now with regular bowel movements on bowel regimen.     Plan  - Miralax BID, Senna BID  - compazine PRN    Moderate malnutrition  Nutrition consulted. Most recent weight and BMI monitored-     Measurements:  Wt Readings from Last 1 Encounters:   05/22/24 105.5 kg (232 lb 9.4 oz)   Body mass index is 36.43 kg/m².    Patient has been screened and assessed by RD.    Malnutrition Type:  Context: acute illness or injury  Level: moderate    Malnutrition Characteristic Summary:  Weight Loss (Malnutrition): 10% in 6 months  Subcutaneous Fat (Malnutrition): mild depletion  Muscle Mass (Malnutrition): mild depletion  Fluid Accumulation (Malnutrition): mild    Interventions/Recommendations (treatment strategy):  1.    -- TF  -- going for swallow study with SLP to assess possibility of pleasure oral feedings --> failed repeatedly    Prolonged QT interval  Qtc 526 [04/10/24]      limit Qtc prolonging drugs as able    Spastic hemiplegia of right dominant side as late effect of cerebrovascular disease  Important that patient is able to sit in chair for 4h for dialysis placement needs, even if she requires lift/assistance getting into the chair     This patient has Chronic right hemiplegia due to stroke. Physical therapy services has been scheduled. Continue all standard measures for pressure injury prevention and consult wound care for any wounds (chronic or acute).    ESRD (end stage renal disease) on dialysis  Creatine stable for now. BMP reviewed- noted Estimated Creatinine Clearance: 13.8 mL/min (A) (based on SCr of 5.9 mg/dL (H)). according to latest data. Based on current GFR, CKD stage is end stage.  Monitor UOP and serial BMP and adjust therapy as needed. Renally dose meds. Avoid nephrotoxic medications and procedures.    -- HD MWF  -- nephro following    Status post tracheostomy  Resolved,  "decannulated 4/30    Acute encephalopathy  Pt is non-verbal and does not follow commands at baseline. Vascular Neurology consulted given acute change from baseline. MRI with concern for evolution of prior strokes with noted differential of T2 hyperintensities including vasculitis vs demyelination. Discussed with Vascular Neurology; low concern for ongoing vasculitis/demyelination, likely represents typical evolution of prior infarcts.    Patient at baseline as of 4/22.     Plan  RESOLVED  - Repeat head imaging if concern of new change in mental status/focal deficit    Type 2 diabetes mellitus with hyperglycemia, with long-term current use of insulin  Patient's FSGs are controlled on current medication regimen.  Last A1c reviewed-   Lab Results   Component Value Date    HGBA1C 10.4 (H) 01/30/2024     Most recent fingerstick glucose reviewed-   Recent Labs   Lab 05/22/24  2056 05/23/24  0018 05/23/24  0611 05/23/24  0853   POCTGLUCOSE 196* 181* 179* 198*       Current correctional scale  Medium  Maintain anti-hyperglycemic dose as follows-   Antihyperglycemics (From admission, onward)      Start     Stop Route Frequency Ordered    04/17/24 1200  insulin aspart U-100 pen 4 Units         -- SubQ Every 6 hours 04/17/24 0938    04/13/24 2100  insulin detemir U-100 (Levemir) pen 27 Units         -- SubQ 2 times daily 04/13/24 0931          Hold Oral hypoglycemics while patient is in the hospital.  POCT glucose q6h    Hyponatremia  Patient has hyponatremia which is uncontrolled,We will aim to correct the sodium by 4-6mEq in 24 hours. We will monitor sodium Daily. The hyponatremia is due to ESRD. Discussed with nephrology, dialysate bath adjusted to account for and correct hyponatremia.  No results for input(s): "NA" in the last 24 hours.  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 Pt underwent extensive course, " currently still healing from this, but no longer has wound vac. Pt's wound currently covered with bandage. Picture in media tabs    Plan  Wound care        VTE Risk Mitigation (From admission, onward)           Ordered     heparin (porcine) injection 1,000 Units  As needed (PRN)         05/21/24 1017     heparin (porcine) injection 3,000 Units  As needed (PRN)         04/17/24 0825     heparin (porcine) injection 7,500 Units  Every 8 hours         04/11/24 0252                    Discharge Planning   ZEKE: 5/27/2024     Code Status: Full Code   Is the patient medically ready for discharge?: No    Reason for patient still in hospital (select all that apply): Patient trending condition  Discharge Plan A: Home Health                  Osito Dos Santos MD  Department of Hospital Medicine   [facility]

## 2024-05-23 NOTE — ASSESSMENT & PLAN NOTE
Creatine stable for now. BMP reviewed- noted Estimated Creatinine Clearance: 13.8 mL/min (A) (based on SCr of 5.9 mg/dL (H)). according to latest data. Based on current GFR, CKD stage is end stage.  Monitor UOP and serial BMP and adjust therapy as needed. Renally dose meds. Avoid nephrotoxic medications and procedures.    -- HD MWF  -- nephro following

## 2024-05-23 NOTE — PROGRESS NOTES
[facility]  Nephrology  Progress Note    Patient Name: Sarah Saravia  MRN: [id number]  Admission Date: 4/10/2024  Hospital Length of Stay: 43 days  Attending Provider: Soco Erickson MD   Primary Care Physician: Deanna, Primary Doctor  Principal Problem:Acute cystitis with hematuria    Subjective:     Interval History:   HD yesterday with 2 L removed. No distress on exam. Meeting today for discharge planning.     Review of patient's allergies indicates:  No Known Allergies  Current Facility-Administered Medications   Medication Frequency    [START ON 5/24/2024] 0.9%  NaCl infusion Once    acetaminophen oral solution 650 mg Q6H PRN    ascorbic acid (vitamin C) tablet 500 mg BID    aspirin chewable tablet 81 mg Daily    atorvastatin tablet 40 mg Daily    dextrose 10 % infusion Continuous PRN    dextrose 10% bolus 125 mL 125 mL PRN    dextrose 10% bolus 250 mL 250 mL PRN    epoetin merry injection 6,100 Units Every Mon, Wed, Fri    glucagon (human recombinant) injection 1 mg PRN    glucose chewable tablet 16 g PRN    glucose chewable tablet 24 g PRN    heparin (porcine) injection 1,000 Units PRN    heparin (porcine) injection 3,000 Units PRN    heparin (porcine) injection 7,500 Units Q8H    hepatitis B virus vacc.rec(PF) injection 20 mcg vaccine x 1 dose    insulin aspart U-100 pen 4 Units Q6H    insulin detemir U-100 (Levemir) pen 27 Units BID    metoprolol tartrate (LOPRESSOR) tablet 25 mg BID    ondansetron 4 mg/5 mL solution 4 mg Q6H PRN    pantoprazole suspension 40 mg Daily    psyllium husk (aspartame) 3.4 gram packet 1 packet Daily    sodium chloride 0.9% bolus 250 mL 250 mL PRN    sodium chloride 0.9% flush 10 mL Q12H PRN    zinc sulfate capsule 220 mg Daily       Objective:     Vital Signs (Most Recent):  Temp: 98.8 °F (37.1 °C) (05/23/24 0759)  Pulse: 90 (05/23/24 0759)  Resp: 16 (05/23/24 0759)  BP: (!) 151/92 (05/23/24 0759)  SpO2: 99 % (05/23/24 0759) Vital Signs (24h Range):  Temp:  [98.4 °F  "(36.9 °C)-99.8 °F (37.7 °C)] 98.8 °F (37.1 °C)  Pulse:  [] 90  Resp:  [16-20] 16  SpO2:  [96 %-100 %] 99 %  BP: (101-151)/(72-92) 151/92     Weight: 105.5 kg (232 lb 9.4 oz) (05/22/24 1019)  Body mass index is 36.43 kg/m².  Body surface area is 2.23 meters squared.    I/O last 3 completed shifts:  In: 500 [Other:300; NG/GT:200]  Out: 2600 [Other:2600]     Physical Exam  Vitals and nursing note reviewed.   HENT:      Head: Normocephalic and atraumatic.   Neck:      Comments: Trach removed  Cardiovascular:      Rate and Rhythm: Normal rate and regular rhythm.      Heart sounds: Normal heart sounds.   Pulmonary:      Effort: Pulmonary effort is normal. No respiratory distress.      Breath sounds: Normal breath sounds.   Abdominal:      General: Abdomen is flat. There is no distension.      Palpations: Abdomen is soft.      Comments: PEG tube   Musculoskeletal:      Right lower leg: No edema.      Left lower leg: No edema.      Comments: Moves UEs spontaneously   Skin:     General: Skin is warm and dry.   Neurological:      General: No focal deficit present.      Mental Status: She is alert.      Comments: Nonverbal          Significant Labs:  CBC: No results for input(s): "WBC", "RBC", "HGB", "HCT", "PLT", "MCV", "MCH", "MCHC" in the last 168 hours.  CMP:   Recent Labs   Lab 05/22/24  0545   *   CALCIUM 10.4   ALBUMIN 3.1*   *   K 4.1   CO2 23   CL 92*   BUN 56*   CREATININE 5.9*     All labs within the past 24 hours have been reviewed.     Assessment/Plan:     Renal/  * Acute cystitis with hematuria  - defer to primary     ESRD (end stage renal disease) on dialysis  53 y.o. Black or  Female ESRD-HD M[facility]W-F at [facility] presents to ED on 4/10/2024 with UTI.    Of note, the patient was initiated on RRT in February 2024 after being admitted with ALVARADO 2/2 iATN due to septic shock. Perm cath placed on 2/29/24. She was transferred to LTAC on 3/12. Deemed ESRD at LTAC.  Nephrology consulted for " "inpatient ESRD-HD management    Assessment:   - next dialysis session will be tomorrow   - Continue MWF iHD schedule while IP.   - Hyponatremic in setting of ESRD - consider decreasing FWF   - Continue to monitor intake and output  - Please avoid gadolinium, fleets, phos-based laxatives, NSAIDs  - Dialysis thrice weekly unless more urgent indications arise. Will evaluate RRT requirements Daily.    Anemia of ESRD hgb 9.7 c/w EPO with HD  No results for input(s): "WBC", "HGB", "HCT", "PLT" in the last 168 hours.    Lab Results   Component Value Date    FESATURATED 10 (L) 03/15/2024    FERRITIN 414 (H) 03/15/2024       - Goal in ESRD is Hgb of 10-11. Continue EPO.      Secondary hyperparathyroid of renal origin   - phos/ca controlled off binders for now       Ayaz's gangrene  - Per chart         Thank you for your consult. I will follow-up with patient. Please contact us if you have any additional questions.    Shwetha Gregory DNP, FNP-C  Nephrology  [facility]  "

## 2024-05-23 NOTE — ASSESSMENT & PLAN NOTE
"53 y.o. Black or  Female ESRD-HD M-W-F at [facility] presents to ED on 4/10/2024 with UTI.    Of note, the patient was initiated on RRT in February 2024 after being admitted with ALVARADO 2/2 iATN due to septic shock. Perm cath placed on 2/29/24. She was transferred to [facility] on 3/12. Deemed ESRD at [facility].  Nephrology consulted for inpatient ESRD-HD management    Assessment:   - next dialysis session will be tomorrow   - Continue MWF iHD schedule while IP.   - Hyponatremic in setting of ESRD - consider decreasing FWF   - Continue to monitor intake and output  - Please avoid gadolinium, fleets, phos-based laxatives, NSAIDs  - Dialysis thrice weekly unless more urgent indications arise. Will evaluate RRT requirements Daily.    Anemia of ESRD hgb 9.7 c/w EPO with HD  No results for input(s): "WBC", "HGB", "HCT", "PLT" in the last 168 hours.    Lab Results   Component Value Date    FESATURATED 10 (L) 03/15/2024    FERRITIN 414 (H) 03/15/2024       - Goal in ESRD is Hgb of 10-11. Continue EPO.      Secondary hyperparathyroid of renal origin   - phos/ca controlled off binders for now     "

## 2024-05-23 NOTE — PLAN OF CARE
Problem: Infection  Goal: Absence of Infection Signs and Symptoms  Outcome: Progressing     Problem: Skin Injury Risk Increased  Goal: Skin Health and Integrity  Outcome: Progressing     Problem: Adult Inpatient Plan of Care  Goal: Plan of Care Review  Outcome: Progressing  Goal: Patient-Specific Goal (Individualized)  Outcome: Progressing       No acute distress noted this time. Family at bedside. Call light within reach. Will continue to monitor.

## 2024-05-24 LAB
ALBUMIN SERPL BCP-MCNC: 3 G/DL (ref 3.5–5.2)
ANION GAP SERPL CALC-SCNC: 13 MMOL/L (ref 8–16)
BUN SERPL-MCNC: 50 MG/DL (ref 6–20)
CALCIUM SERPL-MCNC: 10.1 MG/DL (ref 8.7–10.5)
CHLORIDE SERPL-SCNC: 97 MMOL/L (ref 95–110)
CO2 SERPL-SCNC: 16 MMOL/L (ref 23–29)
CREAT SERPL-MCNC: 5.2 MG/DL (ref 0.5–1.4)
EST. GFR  (NO RACE VARIABLE): 9.3 ML/MIN/1.73 M^2
GLUCOSE SERPL-MCNC: 147 MG/DL (ref 70–110)
PHOSPHATE SERPL-MCNC: 4 MG/DL (ref 2.7–4.5)
POCT GLUCOSE: 122 MG/DL (ref 70–110)
POCT GLUCOSE: 167 MG/DL (ref 70–110)
POCT GLUCOSE: 172 MG/DL (ref 70–110)
POCT GLUCOSE: 176 MG/DL (ref 70–110)
POCT GLUCOSE: 178 MG/DL (ref 70–110)
POTASSIUM SERPL-SCNC: 4.2 MMOL/L (ref 3.5–5.1)
SODIUM SERPL-SCNC: 126 MMOL/L (ref 136–145)

## 2024-05-24 PROCEDURE — 63600175 PHARM REV CODE 636 W HCPCS: Performed by: NURSE PRACTITIONER

## 2024-05-24 PROCEDURE — 80100014 HC HEMODIALYSIS 1:1

## 2024-05-24 PROCEDURE — 25000003 PHARM REV CODE 250: Performed by: NURSE PRACTITIONER

## 2024-05-24 PROCEDURE — 36415 COLL VENOUS BLD VENIPUNCTURE: CPT

## 2024-05-24 PROCEDURE — 25000003 PHARM REV CODE 250

## 2024-05-24 PROCEDURE — 63600175 PHARM REV CODE 636 W HCPCS

## 2024-05-24 PROCEDURE — 20600001 HC STEP DOWN PRIVATE ROOM

## 2024-05-24 PROCEDURE — 90935 HEMODIALYSIS ONE EVALUATION: CPT | Mod: ,,, | Performed by: NURSE PRACTITIONER

## 2024-05-24 PROCEDURE — 25000242 PHARM REV CODE 250 ALT 637 W/ HCPCS

## 2024-05-24 PROCEDURE — 63600175 PHARM REV CODE 636 W HCPCS: Performed by: INTERNAL MEDICINE

## 2024-05-24 PROCEDURE — 80069 RENAL FUNCTION PANEL: CPT

## 2024-05-24 PROCEDURE — 27000207 HC ISOLATION

## 2024-05-24 RX ADMIN — ATORVASTATIN CALCIUM 40 MG: 40 TABLET, FILM COATED ORAL at 11:05

## 2024-05-24 RX ADMIN — INSULIN ASPART 4 UNITS: 100 INJECTION, SOLUTION INTRAVENOUS; SUBCUTANEOUS at 12:05

## 2024-05-24 RX ADMIN — HEPARIN SODIUM 3000 UNITS: 1000 INJECTION, SOLUTION INTRAVENOUS; SUBCUTANEOUS at 07:05

## 2024-05-24 RX ADMIN — METOPROLOL TARTRATE 25 MG: 25 TABLET, FILM COATED ORAL at 09:05

## 2024-05-24 RX ADMIN — ERYTHROPOIETIN 6100 UNITS: 10000 INJECTION, SOLUTION INTRAVENOUS; SUBCUTANEOUS at 08:05

## 2024-05-24 RX ADMIN — HEPARIN SODIUM 1000 UNITS: 1000 INJECTION, SOLUTION INTRAVENOUS; SUBCUTANEOUS at 11:05

## 2024-05-24 RX ADMIN — HEPARIN SODIUM 7500 UNITS: 5000 INJECTION INTRAVENOUS; SUBCUTANEOUS at 05:05

## 2024-05-24 RX ADMIN — INSULIN DETEMIR 27 UNITS: 100 INJECTION, SOLUTION SUBCUTANEOUS at 09:05

## 2024-05-24 RX ADMIN — PANTOPRAZOLE SODIUM 40 MG: 40 GRANULE, DELAYED RELEASE ORAL at 11:05

## 2024-05-24 RX ADMIN — PSYLLIUM HUSK 1 PACKET: 3.4 POWDER ORAL at 11:05

## 2024-05-24 RX ADMIN — INSULIN ASPART 4 UNITS: 100 INJECTION, SOLUTION INTRAVENOUS; SUBCUTANEOUS at 01:05

## 2024-05-24 RX ADMIN — INSULIN ASPART 4 UNITS: 100 INJECTION, SOLUTION INTRAVENOUS; SUBCUTANEOUS at 05:05

## 2024-05-24 RX ADMIN — Medication 500 MG: at 11:05

## 2024-05-24 RX ADMIN — INSULIN ASPART 4 UNITS: 100 INJECTION, SOLUTION INTRAVENOUS; SUBCUTANEOUS at 11:05

## 2024-05-24 RX ADMIN — Medication 500 MG: at 09:05

## 2024-05-24 RX ADMIN — ZINC SULFATE 220 MG (50 MG) CAPSULE 220 MG: CAPSULE at 11:05

## 2024-05-24 RX ADMIN — SODIUM CHLORIDE: 9 INJECTION, SOLUTION INTRAVENOUS at 07:05

## 2024-05-24 RX ADMIN — HEPARIN SODIUM 7500 UNITS: 5000 INJECTION INTRAVENOUS; SUBCUTANEOUS at 01:05

## 2024-05-24 RX ADMIN — HEPARIN SODIUM 7500 UNITS: 5000 INJECTION INTRAVENOUS; SUBCUTANEOUS at 09:05

## 2024-05-24 RX ADMIN — ASPIRIN 81 MG CHEWABLE TABLET 81 MG: 81 TABLET CHEWABLE at 11:05

## 2024-05-24 NOTE — PLAN OF CARE
Gave the number to Office of aging for the Community Waiver Program to get sitters in the home. All questions answered.  Discharge Plan A and Plan B have been determined by review of patient's clinical status, future medical and therapeutic needs, and coverage/benefits for post-acute care in coordination with multidisciplinary team members.  Mason Massey, [facility] [place]    [facility]  [phone]

## 2024-05-24 NOTE — PROGRESS NOTES
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 History was given from patient's daughter. She was undergoing dialysis today and noticed she was lethargic, less alert than usual self. Pt completed dialysis and still not acting herself. EMS was called, fever of a 100.  On chart review, she did have an episode of large volume emesis around 1700.  Per EMS, she had a slightly elevated temp 100.0°F, glucose 300s.     In the ED: UA 2+ leuks, >100 WBC, many bacteria, WBC 17, CT abd/pelvis concerning for cystitis. Given vanc/zosyn    Overview/Hospital Course:  Patient was admitted to Hospital Medicine service for medical management and evaluation of urosepsis. Patient was continued on vanc/zosyn. Vascular Neurology consulted concerning mental status change with the recommendation to initiate ASA 81 QD and to obtain an MRI to r/o new stroke. Imaging pending. Nephrology was consulted for regularly scheduled dialysis. Afternoon of 4/12, patient was unable to tolerate filtration of volume during dialsis 2/2 hypotension. Patient received 500cc bolus and was transferred back to floor after finishing the session without volume removed. Will monitor for signs of volume overload. Tailoring insulin regimen while uptitrating tube feeds to goal rate. Staph Epi in all 4 bottles; ID with recommendation to continue Vanc & de-escalate meropenem to ertapenem on 04/15 through 4/16. Patient completed IV course of UTI coverage. Patient tolerated volume removal well in dialysis  throughout the rest of her hospital stay. Pending discharge to LTACH pending bed availability. Patient without BM with one episode of vomiting overnight 4/17. KUB with bowel gas and low concern for obstruction with no transition point noted. Escalating bowel regimen. Pending placement as of 4/22. Pulm consult placed to evaluate for possible trach downsize & eventual decannulation to assist placement if safe. Trach capping trial per pulm for 48h and will assess for decannulation on Monday. DVT studies negative. Pulm successfully decannulated pt 4/30, IR exchanged TDC. Pending swallow study with SLP and waiting for dispo options. Pt failed swallow study, placement pending. Working with PT on mobilize pt to sitting in a chair to expand placement options. Pt successfully mobilized using sandee lift but required 2 people to do so. Discussing dispo plan with family, likely d/c home if HD, DME needs able to be met. Pending acceptance at outpatient HD center. Ongoing placement difficulties d/t need for accepting HD facility to have sandee lift with 2 personnel to operate. Family meeting to discuss disposition options planned for Thursday 5/23 at 1 PM. Per family meeting referral sent to [facility], pending placement    Interval History: NAEON. Pending placement at [facility]      Objective:     Vital Signs (Most Recent):  Temp: 98.3 °F (36.8 °C) (05/24/24 0735)  Pulse: 101 (05/24/24 1030)  Resp: 18 (05/24/24 0735)  BP: 119/72 (05/24/24 1030)  SpO2: 100 % (05/24/24 0701) Vital Signs (24h Range):  Temp:  [98.1 °F (36.7 °C)-98.6 °F (37 °C)] 98.3 °F (36.8 °C)  Pulse:  [] 101  Resp:  [18] 18  SpO2:  [98 %-100 %] 100 %  BP: (115-152)/(60-94) 119/72     Weight: 105.5 kg (232 lb 9.4 oz)  Body mass index is 36.43 kg/m².    Intake/Output Summary (Last 24 hours) at 5/24/2024 1041  Last data filed at 5/23/2024 2000  Gross per 24 hour   Intake 200 ml   Output --   Net 200 ml         Physical Exam  Vitals and nursing note reviewed.    Constitutional:       General: She is not in acute distress.     Appearance: She is not toxic-appearing or diaphoretic.   HENT:      Head: Normocephalic and atraumatic.   Eyes:      General:         Right eye: No discharge.         Left eye: No discharge.      Conjunctiva/sclera: Conjunctivae normal.   Neck:      Comments: Decannulated  Cardiovascular:      Rate and Rhythm: Normal rate and regular rhythm.      Heart sounds: Normal heart sounds.   Pulmonary:      Effort: Pulmonary effort is normal. No respiratory distress.   Abdominal:      General: Abdomen is flat. There is no distension.      Tenderness: There is no abdominal tenderness.      Comments: PEG tube in place with new skin breakdown and retraction   Musculoskeletal:      Right lower leg: No edema.      Left lower leg: No edema.   Skin:     General: Skin is warm and dry.   Neurological:      General: No focal deficit present.      Mental Status: She is alert.      Comments: Nonverbal             Significant Labs: All pertinent labs within the past 24 hours have been reviewed.    Significant Imaging: I have reviewed all pertinent imaging results/findings within the past 24 hours.    Assessment/Plan:      * Acute cystitis with hematuria  resolved    Pt presenting with AMS and worsening lethargy. Pt is non-verbal at baseline, does not follow commands, but was less responsive than normal. Pt found to have a fever. Urinary source suspected based off of urine and CT abd/pelvis. Pt has many prior resistant bacterial infections including urinary sources. Has prior with sensitivity to zosyn and has also improved off ED dose of vanc/zosyn. Lactic elevated a 3.8->3.49 prior to completion of fluid bolus 500ml.    Patient with new fever and tachycardia on 4/25. Low threshold to initiate additional infectious workup and repeat UA.     Plan  S/p course of vanc/ertapenem per ID. Course completed 4/16.  Daily cbc  Contact precautions    *on contact precautions  indefinitely while patient still makes urine given pt incontinent per infection control    Debility  Needs:  Wheelchair: patient has a mobility limitation that significantly impairs her ability to participate in one or more mobility related activities of daily living (MRADL's) such as toileting, feeding, dressing, grooming, and bathing in customary locations in the home.  The mobility limitation cannot be sufficiently resolved by the use of a cane or walker.   The use of a manual wheelchair will significantly improve the patient's ability to participate in MRADLS and the patient will use it on regular basis in the home.  Family/pt has expressed her willingness to use a manual wheelchair in the home.  She also has a caregiver who is capable of assisting in mobility.    Mrs Saravia requires a hospital bed due to her requiring positioning of the body in ways not feasible with an ordinary bed to alleviate pain/ is completely immobile /or limited mobility and cannot independently make changes in body position without the use of the bed.  The positioning of the body cannot be sufficiently resolved by the use of pillows and wedges    Patient has a mobility limitation that significantly impairs their ability to participate in one or more mobility related activities of daily living, including toileting. This deficit can be resolved by using a bedside commode. Patient demonstrates mobility limitations that will cause them to be confined to one room at home without bathroom access for up to 30 days. Using a bedside commode will greatly improve the patient's ability to participate in MRADLs       Complication of vascular dialysis catheter  Cath intermittently clogging, not resolving with cath-hedy, going for IR venogram 4/30 with possible exchange. S/p exchange with IR on 4/30      Constipation  RESOLVED with Bowel regimen  Patient without BM x5d. One episode of vomitus night of 4/17. Some concern for bowel obstruction.  Tolerating continuous tube feeds at goal rate with 0cc on residuals. Physical exam with bowel sounds, soft nontender abdomen. KUB without concern for obstruction with bowel gas, lack of transition point.    Patient now with regular bowel movements on bowel regimen.     Plan  - Miralax BID, Senna BID  - compazine PRN    Moderate malnutrition  Nutrition consulted. Most recent weight and BMI monitored-     Measurements:  Wt Readings from Last 1 Encounters:   05/22/24 105.5 kg (232 lb 9.4 oz)   Body mass index is 36.43 kg/m².    Patient has been screened and assessed by RD.    Malnutrition Type:  Context: acute illness or injury  Level: moderate    Malnutrition Characteristic Summary:  Weight Loss (Malnutrition): 10% in 6 months  Subcutaneous Fat (Malnutrition): mild depletion  Muscle Mass (Malnutrition): mild depletion  Fluid Accumulation (Malnutrition): mild    Interventions/Recommendations (treatment strategy):  1.    -- TF  -- going for swallow study with SLP to assess possibility of pleasure oral feedings --> failed repeatedly    Prolonged QT interval  Qtc 526 [04/10/24]      limit Qtc prolonging drugs as able    Spastic hemiplegia of right dominant side as late effect of cerebrovascular disease  Important that patient is able to sit in chair for 4h for dialysis placement needs, even if she requires lift/assistance getting into the chair     This patient has Chronic right hemiplegia due to stroke. Physical therapy services has been scheduled. Continue all standard measures for pressure injury prevention and consult wound care for any wounds (chronic or acute).    ESRD (end stage renal disease) on dialysis  Creatine stable for now. BMP reviewed- noted Estimated Creatinine Clearance: 15.6 mL/min (A) (based on SCr of 5.2 mg/dL (H)). according to latest data. Based on current GFR, CKD stage is end stage.  Monitor UOP and serial BMP and adjust therapy as needed. Renally dose meds. Avoid nephrotoxic medications and  procedures.    -- HD MWF  -- nephro following    Status post tracheostomy  Resolved, decannulated 4/30    Acute encephalopathy  Pt is non-verbal and does not follow commands at baseline. Vascular Neurology consulted given acute change from baseline. MRI with concern for evolution of prior strokes with noted differential of T2 hyperintensities including vasculitis vs demyelination. Discussed with Vascular Neurology; low concern for ongoing vasculitis/demyelination, likely represents typical evolution of prior infarcts.    Patient at baseline as of 4/22.     Plan  RESOLVED  - Repeat head imaging if concern of new change in mental status/focal deficit    Type 2 diabetes mellitus with hyperglycemia, with long-term current use of insulin  Patient's FSGs are controlled on current medication regimen.  Last A1c reviewed-   Lab Results   Component Value Date    HGBA1C 10.4 (H) 01/30/2024     Most recent fingerstick glucose reviewed-   Recent Labs   Lab 05/23/24  1637 05/23/24  2146 05/24/24  0034 05/24/24  0542   POCTGLUCOSE 171* 162* 172* 167*       Current correctional scale  Medium  Maintain anti-hyperglycemic dose as follows-   Antihyperglycemics (From admission, onward)      Start     Stop Route Frequency Ordered    04/17/24 1200  insulin aspart U-100 pen 4 Units         -- SubQ Every 6 hours 04/17/24 0938    04/13/24 2100  insulin detemir U-100 (Levemir) pen 27 Units         -- SubQ 2 times daily 04/13/24 0931          Hold Oral hypoglycemics while patient is in the hospital.  POCT glucose q6h    Hyponatremia  Patient has hyponatremia which is uncontrolled,We will aim to correct the sodium by 4-6mEq in 24 hours. We will monitor sodium Daily. The hyponatremia is due to ESRD. Discussed with nephrology, dialysate bath adjusted to account for and correct hyponatremia.  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 Pt underwent extensive course, currently still healing from this, but no longer has wound vac. Pt's wound currently covered with bandage. Picture in media tabs    Plan  Wound care        VTE Risk Mitigation (From admission, onward)           Ordered     heparin (porcine) injection 1,000 Units  As needed (PRN)         05/21/24 1017     heparin (porcine) injection 3,000 Units  As needed (PRN)         04/17/24 0825     heparin (porcine) injection 7,500 Units  Every 8 hours         04/11/24 0252                    Discharge Planning   ZEKE: 5/31/2024     Code Status: Full Code   Is the patient medically ready for discharge?: No    Reason for patient still in hospital (select all that apply): Pending disposition  Discharge Plan A: Home Health                  Osito Dos Santos MD  Department of Hospital Medicine   [facility] - Telemetry Stepdown

## 2024-05-24 NOTE — ASSESSMENT & PLAN NOTE
Patient's FSGs are controlled on current medication regimen.  Last A1c reviewed-   Lab Results   Component Value Date    HGBA1C 10.4 (H) 01/30/2024     Most recent fingerstick glucose reviewed-   Recent Labs   Lab 05/23/24  1637 05/23/24  2146 05/24/24  0034 05/24/24  0542   POCTGLUCOSE 171* 162* 172* 167*       Current correctional scale  Medium  Maintain anti-hyperglycemic dose as follows-   Antihyperglycemics (From admission, onward)    Start     Stop Route Frequency Ordered    04/17/24 1200  insulin aspart U-100 pen 4 Units         -- SubQ Every 6 hours 04/17/24 0938    04/13/24 2100  insulin detemir U-100 (Levemir) pen 27 Units         -- SubQ 2 times daily 04/13/24 0931        Hold Oral hypoglycemics while patient is in the hospital.  POCT glucose q6h

## 2024-05-24 NOTE — ASSESSMENT & PLAN NOTE
Patient has hyponatremia which is uncontrolled,We will aim to correct the sodium by 4-6mEq in 24 hours. We will monitor sodium Daily. The hyponatremia is due to ESRD. Discussed with nephrology, dialysate bath adjusted to account for and correct hyponatremia.  Recent Labs   Lab 05/24/24  0412   *     IMPROVING  - Daily morning CMP  - Continue to montior

## 2024-05-24 NOTE — PT/OT/SLP PROGRESS
Physical Therapy      Patient Name:  Sarah Saravia   MRN:  [id number]    Patient not seen today secondary to Dialysis. Will follow-up on next scheduled visit.

## 2024-05-24 NOTE — NURSING
End of shift note:  Dialysis 3L removed.  VSS, NDN.  Wound care performed, partial bed bath given.  Peg site cleansed, split gauze applied.  Bed in lowest position, side rails X4, family at bedside.

## 2024-05-24 NOTE — PROGRESS NOTES
Please see previous notes from this SW for continuity.    __________________________________________________  Per chart review, pt's current planned d/c disposition is [facility] nursing home.    SW contacted [facility] central intake and spoke with pt coordinator Estee. SW informed pt currently planned to d/c to [facility]. SW requested pt's dialysis referral is rerouted to [facility] (high acuity unit).    Pt's dialysis referral pending medical director approval at [facility].    SW to follow with updates.    Dafne Mccord, GAYLA  [facility]  [phone]

## 2024-05-24 NOTE — PROGRESS NOTES
[facility] NEPH[facility]OLOG[facility] HEMODIALYSIS NOTE     Patient currently on hemodialysis for removal of uremic toxins .     Patient seen and evaluated on hemodialysis, tolerating treatment, see HD flowsheet for vitals and assessments.      No Hypotension, chest pain, shortness of breath, cramping, nausea or vomiting.     Target UF: 2 L as tolerated, keep MAP >65.  Pending safe discharge plan, no accepting facilities, no accepting HD facility limiting safe discharge home.   More alert on exam, nodding yes/no to direct questions.   Continue EPO  Continuous tube feedings at 40 mL/hr, phos 4.0, no binders  No lab stick/BP intake on access site  Continue to monitor intake and output, daily weights   Please avoid gadolinium, fleets, phos-based laxatives, NSAIDs  Will follow closely and continue dialysis treatments while in-patient    SHERLY Gregory DNP, APRN, FNP-C  Department of Nephrology  [facility]  Pager: [phone]

## 2024-05-24 NOTE — SUBJECTIVE & OBJECTIVE
Interval History: NAEON. Pending placement at [facility]      Objective:     Vital Signs (Most Recent):  Temp: 98.3 °F (36.8 °C) (05/24/24 0735)  Pulse: 101 (05/24/24 1030)  Resp: 18 (05/24/24 0735)  BP: 119/72 (05/24/24 1030)  SpO2: 100 % (05/24/24 0701) Vital Signs (24h Range):  Temp:  [98.1 °F (36.7 °C)-98.6 °F (37 °C)] 98.3 °F (36.8 °C)  Pulse:  [] 101  Resp:  [18] 18  SpO2:  [98 %-100 %] 100 %  BP: (115-152)/(60-94) 119/72     Weight: 105.5 kg (232 lb 9.4 oz)  Body mass index is 36.43 kg/m².    Intake/Output Summary (Last 24 hours) at 5/24/2024 1041  Last data filed at 5/23/2024 2000  Gross per 24 hour   Intake 200 ml   Output --   Net 200 ml         Physical Exam  Vitals and nursing note reviewed.   Constitutional:       General: She is not in acute distress.     Appearance: She is not toxic-appearing or diaphoretic.   HENT:      Head: Normocephalic and atraumatic.   Eyes:      General:         Right eye: No discharge.         Left eye: No discharge.      Conjunctiva/sclera: Conjunctivae normal.   Neck:      Comments: Decannulated  Cardiovascular:      Rate and Rhythm: Normal rate and regular rhythm.      Heart sounds: Normal heart sounds.   Pulmonary:      Effort: Pulmonary effort is normal. No respiratory distress.   Abdominal:      General: Abdomen is flat. There is no distension.      Tenderness: There is no abdominal tenderness.      Comments: PEG tube in place with new skin breakdown and retraction   Musculoskeletal:      Right lower leg: No edema.      Left lower leg: No edema.   Skin:     General: Skin is warm and dry.   Neurological:      General: No focal deficit present.      Mental Status: She is alert.      Comments: Nonverbal             Significant Labs: All pertinent labs within the past 24 hours have been reviewed.    Significant Imaging: I have reviewed all pertinent imaging results/findings within the past 24 hours.

## 2024-05-24 NOTE — PLAN OF CARE
[facility] - Telemetry Stepdown  Discharge Reassessment    Primary Care Provider: No, Primary Doctor    Expected Discharge Date: 5/31/2024    Reassessment (most recent)       Discharge Reassessment - 05/24/24 1259          Discharge Reassessment    Assessment Type Discharge Planning Reassessment     Did the patient's condition or plan change since previous assessment? Yes     Discharge Plan discussed with: Adult children     Communicated ZEKE with patient/caregiver Yes     Discharge Plan A Skilled Nursing Facility     Discharge Plan B New Nursing Home placement - senior care care facility     DME Needed Upon Discharge  other (see comments)   TBD    Transition of Care Barriers DIalysis placement issues     Why the patient remains in the hospital Placement issues        Post-Acute Status    Post-Acute Authorization Placement     Post-Acute Placement Status Referrals Sent     Diaylsis Status Referrals Sent                     Discharge Plan A and Plan B have been determined by review of patient's clinical status, future medical and therapeutic needs, and coverage/benefits for post-acute care in coordination with multidisciplinary team members.     Sara Loredo RN  Ext [phone]

## 2024-05-24 NOTE — PROGRESS NOTES
3.5hr HD complete. 2L removed. RIJ CVC heparin locked. Report given to Radha BARBER. Pt left [facility] via bed with transport.

## 2024-05-24 NOTE — PLAN OF CARE
Referral for SNF placement faxedf to [facility]. Will continue to follow.    Your fax has been successfully sent to [phone] at [phone].  ------------------------------------------------------------  From: [id number]  ------------------------------------------------------------  5/24/2024 12:25:13 PM Transmission Record          Sent to [phone] with remote ID "          Result: (0/339;0/0) Success          Page record: 1 - 46          Elapsed time: 20:21 on channel 27

## 2024-05-24 NOTE — PT/OT/SLP PROGRESS
Occupational Therapy      Patient Name:  Sarah Saravia   MRN:  [id number]    Patient not seen today secondary to Dialysis . OT to see 05-27 5/24/2024

## 2024-05-24 NOTE — NURSING
Notified Osito Dos Santos MD of skin changes around feeding tube insertion site, MD came to assess patient

## 2024-05-24 NOTE — PT/OT/SLP PROGRESS
Speech Language Pathology      Sarah Saravia  MRN: [id number]  [id number] A    Patient not seen today secondary to patient URIEL across two attempts. SLP unable to return for third attempt this date. ST will continue to follow per plan of care.    no dysuria, no frequency, and no hematuria.

## 2024-05-24 NOTE — ASSESSMENT & PLAN NOTE
Creatine stable for now. BMP reviewed- noted Estimated Creatinine Clearance: 15.6 mL/min (A) (based on SCr of 5.2 mg/dL (H)). according to latest data. Based on current GFR, CKD stage is end stage.  Monitor UOP and serial BMP and adjust therapy as needed. Renally dose meds. Avoid nephrotoxic medications and procedures.    -- HD MWF  -- nephro following

## 2024-05-25 LAB
ANION GAP SERPL CALC-SCNC: 11 MMOL/L (ref 8–16)
BUN SERPL-MCNC: 31 MG/DL (ref 6–20)
CALCIUM SERPL-MCNC: 10.3 MG/DL (ref 8.7–10.5)
CHLORIDE SERPL-SCNC: 89 MMOL/L (ref 95–110)
CO2 SERPL-SCNC: 28 MMOL/L (ref 23–29)
CREAT SERPL-MCNC: 4 MG/DL (ref 0.5–1.4)
EST. GFR  (NO RACE VARIABLE): 12.8 ML/MIN/1.73 M^2
GLUCOSE SERPL-MCNC: 177 MG/DL (ref 70–110)
POCT GLUCOSE: 161 MG/DL (ref 70–110)
POCT GLUCOSE: 163 MG/DL (ref 70–110)
POCT GLUCOSE: 169 MG/DL (ref 70–110)
POCT GLUCOSE: 174 MG/DL (ref 70–110)
POCT GLUCOSE: 195 MG/DL (ref 70–110)
POCT GLUCOSE: 64 MG/DL (ref 70–110)
POTASSIUM SERPL-SCNC: 3.5 MMOL/L (ref 3.5–5.1)
SODIUM SERPL-SCNC: 128 MMOL/L (ref 136–145)

## 2024-05-25 PROCEDURE — 27000207 HC ISOLATION

## 2024-05-25 PROCEDURE — 25000003 PHARM REV CODE 250

## 2024-05-25 PROCEDURE — 63600175 PHARM REV CODE 636 W HCPCS

## 2024-05-25 PROCEDURE — 20600001 HC STEP DOWN PRIVATE ROOM

## 2024-05-25 PROCEDURE — 80048 BASIC METABOLIC PNL TOTAL CA: CPT | Performed by: STUDENT IN AN ORGANIZED HEALTH CARE EDUCATION/TRAINING PROGRAM

## 2024-05-25 PROCEDURE — 25000242 PHARM REV CODE 250 ALT 637 W/ HCPCS

## 2024-05-25 PROCEDURE — 36415 COLL VENOUS BLD VENIPUNCTURE: CPT | Performed by: STUDENT IN AN ORGANIZED HEALTH CARE EDUCATION/TRAINING PROGRAM

## 2024-05-25 RX ORDER — SODIUM CHLORIDE 9 MG/ML
INJECTION, SOLUTION INTRAVENOUS ONCE
Status: COMPLETED | OUTPATIENT
Start: 2024-05-27 | End: 2024-05-27

## 2024-05-25 RX ORDER — INSULIN GLARGINE 100 [IU]/ML
10 INJECTION, SOLUTION SUBCUTANEOUS NIGHTLY
Status: DISCONTINUED | OUTPATIENT
Start: 2024-05-25 | End: 2024-06-09

## 2024-05-25 RX ADMIN — ATORVASTATIN CALCIUM 40 MG: 40 TABLET, FILM COATED ORAL at 09:05

## 2024-05-25 RX ADMIN — HEPARIN SODIUM 7500 UNITS: 5000 INJECTION INTRAVENOUS; SUBCUTANEOUS at 03:05

## 2024-05-25 RX ADMIN — ZINC SULFATE 220 MG (50 MG) CAPSULE 220 MG: CAPSULE at 09:05

## 2024-05-25 RX ADMIN — Medication 500 MG: at 09:05

## 2024-05-25 RX ADMIN — ASPIRIN 81 MG CHEWABLE TABLET 81 MG: 81 TABLET CHEWABLE at 09:05

## 2024-05-25 RX ADMIN — HEPARIN SODIUM 7500 UNITS: 5000 INJECTION INTRAVENOUS; SUBCUTANEOUS at 09:05

## 2024-05-25 RX ADMIN — INSULIN GLARGINE 10 UNITS: 100 INJECTION, SOLUTION SUBCUTANEOUS at 09:05

## 2024-05-25 RX ADMIN — PSYLLIUM HUSK 1 PACKET: 3.4 POWDER ORAL at 09:05

## 2024-05-25 RX ADMIN — METOPROLOL TARTRATE 25 MG: 25 TABLET, FILM COATED ORAL at 09:05

## 2024-05-25 RX ADMIN — HEPARIN SODIUM 7500 UNITS: 5000 INJECTION INTRAVENOUS; SUBCUTANEOUS at 06:05

## 2024-05-25 RX ADMIN — PANTOPRAZOLE SODIUM 40 MG: 40 GRANULE, DELAYED RELEASE ORAL at 09:05

## 2024-05-25 NOTE — ASSESSMENT & PLAN NOTE
Patient's FSGs are controlled on current medication regimen.  Last A1c reviewed-   Lab Results   Component Value Date    HGBA1C 10.4 (H) 01/30/2024     Most recent fingerstick glucose reviewed-   Recent Labs   Lab 05/24/24 2127 05/24/24  2343 05/25/24  0622 05/25/24  0811   POCTGLUCOSE 176* 163* 64* 174*       Current correctional scale  Medium  Maintain anti-hyperglycemic dose as follows-   Antihyperglycemics (From admission, onward)    None        Hold Oral hypoglycemics while patient is in the hospital.  POCT glucose q6h

## 2024-05-25 NOTE — SUBJECTIVE & OBJECTIVE
Interval History: NAEON. Pending placement. Wound care to eval PEG this morning      Objective:     Vital Signs (Most Recent):  Temp: 98.6 °F (37 °C) (05/25/24 0730)  Pulse: 100 (05/25/24 0730)  Resp: 18 (05/25/24 0730)  BP: 139/78 (05/25/24 0730)  SpO2: 98 % (05/25/24 0730) Vital Signs (24h Range):  Temp:  [98.2 °F (36.8 °C)-98.9 °F (37.2 °C)] 98.6 °F (37 °C)  Pulse:  [] 100  Resp:  [18-19] 18  SpO2:  [98 %-100 %] 98 %  BP: (114-139)/(60-88) 139/78     Weight: 105.5 kg (232 lb 9.4 oz)  Body mass index is 36.43 kg/m².    Intake/Output Summary (Last 24 hours) at 5/25/2024 0859  Last data filed at 5/24/2024 1818  Gross per 24 hour   Intake 1537.64 ml   Output 2650 ml   Net -1112.36 ml         Physical Exam  Vitals and nursing note reviewed.   Constitutional:       General: She is not in acute distress.     Appearance: She is not toxic-appearing or diaphoretic.   HENT:      Head: Normocephalic and atraumatic.   Eyes:      General:         Right eye: No discharge.         Left eye: No discharge.      Conjunctiva/sclera: Conjunctivae normal.   Neck:      Comments: Decannulated  Cardiovascular:      Rate and Rhythm: Normal rate and regular rhythm.      Heart sounds: Normal heart sounds.   Pulmonary:      Effort: Pulmonary effort is normal. No respiratory distress.   Abdominal:      General: Abdomen is flat. There is no distension.      Tenderness: There is no abdominal tenderness.      Comments: PEG tube in place with new skin breakdown and retraction   Musculoskeletal:      Right lower leg: No edema.      Left lower leg: No edema.   Skin:     General: Skin is warm and dry.   Neurological:      General: No focal deficit present.      Mental Status: She is alert.      Comments: Nonverbal             Significant Labs: All pertinent labs within the past 24 hours have been reviewed.    Significant Imaging: I have reviewed all pertinent imaging results/findings within the past 24 hours.

## 2024-05-25 NOTE — ASSESSMENT & PLAN NOTE
Creatine stable for now. BMP reviewed- noted Estimated Creatinine Clearance: 20.3 mL/min (A) (based on SCr of 4 mg/dL (H)). according to latest data. Based on current GFR, CKD stage is end stage.  Monitor UOP and serial BMP and adjust therapy as needed. Renally dose meds. Avoid nephrotoxic medications and procedures.    -- HD MWF  -- nephro following

## 2024-05-25 NOTE — PROGRESS NOTES
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 History was given from patient's daughter. She was undergoing dialysis today and noticed she was lethargic, less alert than usual self. Pt completed dialysis and still not acting herself. EMS was called, fever of a 100.  On chart review, she did have an episode of large volume emesis around 1700.  Per EMS, she had a slightly elevated temp 100.0°F, glucose 300s.     In the ED: UA 2+ leuks, >100 WBC, many bacteria, WBC 17, CT abd/pelvis concerning for cystitis. Given vanc/zosyn    Overview/Hospital Course:  Patient was admitted to Hospital Medicine service for medical management and evaluation of urosepsis. Patient was continued on vanc/zosyn. Vascular Neurology consulted concerning mental status change with the recommendation to initiate ASA 81 QD and to obtain an MRI to r/o new stroke. Imaging pending. Nephrology was consulted for regularly scheduled dialysis. Afternoon of 4/12, patient was unable to tolerate filtration of volume during dialsis 2/2 hypotension. Patient received 500cc bolus and was transferred back to floor after finishing the session without volume removed. Will monitor for signs of volume overload. Tailoring insulin regimen while uptitrating tube feeds to goal rate. Staph Epi in all 4 bottles; ID with recommendation to continue Vanc & de-escalate meropenem to ertapenem on 04/15 through 4/16. Patient completed IV course of UTI coverage. Patient tolerated volume removal well in dialysis  throughout the rest of her hospital stay. Pending discharge to LTACH pending bed availability. Patient without BM with one episode of vomiting overnight 4/17. KUB with bowel gas and low concern for obstruction with no transition point noted. Escalating bowel regimen. Pending placement as of 4/22. Pulm consult placed to evaluate for possible trach downsize & eventual decannulation to assist placement if safe. Trach capping trial per pulm for 48h and will assess for decannulation on Monday. DVT studies negative. Pulm successfully decannulated pt 4/30, IR exchanged TDC. Pending swallow study with SLP and waiting for dispo options. Pt failed swallow study, placement pending. Working with PT on mobilize pt to sitting in a chair to expand placement options. Pt successfully mobilized using sandee lift but required 2 people to do so. Discussing dispo plan with family, likely d/c home if HD, DME needs able to be met. Pending acceptance at outpatient HD center. Ongoing placement difficulties d/t need for accepting HD facility to have sandee lift with 2 personnel to operate. Family meeting to discuss disposition options planned for Thursday 5/23 at 1 PM. Per family meeting referral sent to [facility], pending placement    Interval History: NAEON. Pending placement. Wound care to eval PEG this morning      Objective:     Vital Signs (Most Recent):  Temp: 98.6 °F (37 °C) (05/25/24 0730)  Pulse: 100 (05/25/24 0730)  Resp: 18 (05/25/24 0730)  BP: 139/78 (05/25/24 0730)  SpO2: 98 % (05/25/24 0730) Vital Signs (24h Range):  Temp:  [98.2 °F (36.8 °C)-98.9 °F (37.2 °C)] 98.6 °F (37 °C)  Pulse:  [] 100  Resp:  [18-19] 18  SpO2:  [98 %-100 %] 98 %  BP: (114-139)/(60-88) 139/78     Weight: 105.5 kg (232 lb 9.4 oz)  Body mass index is 36.43 kg/m².    Intake/Output Summary (Last 24 hours) at 5/25/2024 0859  Last data filed at 5/24/2024 1818  Gross per 24 hour   Intake 1537.64 ml   Output 2650 ml   Net -1112.36 ml         Physical  Exam  Vitals and nursing note reviewed.   Constitutional:       General: She is not in acute distress.     Appearance: She is not toxic-appearing or diaphoretic.   HENT:      Head: Normocephalic and atraumatic.   Eyes:      General:         Right eye: No discharge.         Left eye: No discharge.      Conjunctiva/sclera: Conjunctivae normal.   Neck:      Comments: Decannulated  Cardiovascular:      Rate and Rhythm: Normal rate and regular rhythm.      Heart sounds: Normal heart sounds.   Pulmonary:      Effort: Pulmonary effort is normal. No respiratory distress.   Abdominal:      General: Abdomen is flat. There is no distension.      Tenderness: There is no abdominal tenderness.      Comments: PEG tube in place with new skin breakdown and retraction   Musculoskeletal:      Right lower leg: No edema.      Left lower leg: No edema.   Skin:     General: Skin is warm and dry.   Neurological:      General: No focal deficit present.      Mental Status: She is alert.      Comments: Nonverbal             Significant Labs: All pertinent labs within the past 24 hours have been reviewed.    Significant Imaging: I have reviewed all pertinent imaging results/findings within the past 24 hours.    Assessment/Plan:      * Acute cystitis with hematuria  resolved    Pt presenting with AMS and worsening lethargy. Pt is non-verbal at baseline, does not follow commands, but was less responsive than normal. Pt found to have a fever. Urinary source suspected based off of urine and CT abd/pelvis. Pt has many prior resistant bacterial infections including urinary sources. Has prior with sensitivity to zosyn and has also improved off ED dose of vanc/zosyn. Lactic elevated a 3.8->3.49 prior to completion of fluid bolus 500ml.    Patient with new fever and tachycardia on 4/25. Low threshold to initiate additional infectious workup and repeat UA.     Plan  S/p course of vanc/ertapenem per ID. Course completed 4/16.  Daily cbc  Contact  precautions    *on contact precautions indefinitely while patient still makes urine given pt incontinent per infection control    Debility  Needs:  Wheelchair: patient has a mobility limitation that significantly impairs her ability to participate in one or more mobility related activities of daily living (MRADL's) such as toileting, feeding, dressing, grooming, and bathing in customary locations in the home.  The mobility limitation cannot be sufficiently resolved by the use of a cane or walker.   The use of a manual wheelchair will significantly improve the patient's ability to participate in MRADLS and the patient will use it on regular basis in the home.  Family/pt has expressed her willingness to use a manual wheelchair in the home.  She also has a caregiver who is capable of assisting in mobility.    Mrs Saravia requires a hospital bed due to her requiring positioning of the body in ways not feasible with an ordinary bed to alleviate pain/ is completely immobile /or limited mobility and cannot independently make changes in body position without the use of the bed.  The positioning of the body cannot be sufficiently resolved by the use of pillows and wedges    Patient has a mobility limitation that significantly impairs their ability to participate in one or more mobility related activities of daily living, including toileting. This deficit can be resolved by using a bedside commode. Patient demonstrates mobility limitations that will cause them to be confined to one room at home without bathroom access for up to 30 days. Using a bedside commode will greatly improve the patient's ability to participate in MRADLs       Complication of vascular dialysis catheter  Cath intermittently clogging, not resolving with cath-hedy, going for IR venogram 4/30 with possible exchange. S/p exchange with IR on 4/30      Constipation  RESOLVED with Bowel regimen  Patient without BM x5d. One episode of vomitus night of 4/17. Some  concern for bowel obstruction. Tolerating continuous tube feeds at goal rate with 0cc on residuals. Physical exam with bowel sounds, soft nontender abdomen. KUB without concern for obstruction with bowel gas, lack of transition point.    Patient now with regular bowel movements on bowel regimen.     Plan  - Miralax BID, Senna BID  - compazine PRN    Moderate malnutrition  Nutrition consulted. Most recent weight and BMI monitored-     Measurements:  Wt Readings from Last 1 Encounters:   05/22/24 105.5 kg (232 lb 9.4 oz)   Body mass index is 36.43 kg/m².    Patient has been screened and assessed by RD.    Malnutrition Type:  Context: acute illness or injury  Level: moderate    Malnutrition Characteristic Summary:  Weight Loss (Malnutrition): 10% in 6 months  Subcutaneous Fat (Malnutrition): mild depletion  Muscle Mass (Malnutrition): mild depletion  Fluid Accumulation (Malnutrition): mild    Interventions/Recommendations (treatment strategy):  1.    -- TF  -- going for swallow study with SLP to assess possibility of pleasure oral feedings --> failed repeatedly    Prolonged QT interval  Qtc 526 [04/10/24]      limit Qtc prolonging drugs as able    Spastic hemiplegia of right dominant side as late effect of cerebrovascular disease  Important that patient is able to sit in chair for 4h for dialysis placement needs, even if she requires lift/assistance getting into the chair     This patient has Chronic right hemiplegia due to stroke. Physical therapy services has been scheduled. Continue all standard measures for pressure injury prevention and consult wound care for any wounds (chronic or acute).    ESRD (end stage renal disease) on dialysis  Creatine stable for now. BMP reviewed- noted Estimated Creatinine Clearance: 20.3 mL/min (A) (based on SCr of 4 mg/dL (H)). according to latest data. Based on current GFR, CKD stage is end stage.  Monitor UOP and serial BMP and adjust therapy as needed. Renally dose meds. Avoid  nephrotoxic medications and procedures.    -- HD MWF  -- nephro following    Status post tracheostomy  Resolved, decannulated 4/30    Acute encephalopathy  Pt is non-verbal and does not follow commands at baseline. Vascular Neurology consulted given acute change from baseline. MRI with concern for evolution of prior strokes with noted differential of T2 hyperintensities including vasculitis vs demyelination. Discussed with Vascular Neurology; low concern for ongoing vasculitis/demyelination, likely represents typical evolution of prior infarcts.    Patient at baseline as of 4/22.     Plan  RESOLVED  - Repeat head imaging if concern of new change in mental status/focal deficit    Type 2 diabetes mellitus with hyperglycemia, with long-term current use of insulin  Patient's FSGs are controlled on current medication regimen.  Last A1c reviewed-   Lab Results   Component Value Date    HGBA1C 10.4 (H) 01/30/2024     Most recent fingerstick glucose reviewed-   Recent Labs   Lab 05/24/24  2127 05/24/24  2343 05/25/24  0622 05/25/24  0811   POCTGLUCOSE 176* 163* 64* 174*       Current correctional scale  Medium  Maintain anti-hyperglycemic dose as follows-   Antihyperglycemics (From admission, onward)      None          Hold Oral hypoglycemics while patient is in the hospital.  POCT glucose q6h    Hyponatremia  Patient has hyponatremia which is uncontrolled,We will aim to correct the sodium by 4-6mEq in 24 hours. We will monitor sodium Daily. The hyponatremia is due to ESRD. Discussed with nephrology, dialysate bath adjusted to account for and correct hyponatremia.  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 Pt underwent extensive course, currently still healing from this, but no longer has wound vac. Pt's wound currently covered with bandage. Picture in media  tabs    Plan  Wound care        VTE Risk Mitigation (From admission, onward)           Ordered     heparin (porcine) injection 1,000 Units  As needed (PRN)         05/21/24 1017     heparin (porcine) injection 3,000 Units  As needed (PRN)         04/17/24 0825     heparin (porcine) injection 7,500 Units  Every 8 hours         04/11/24 0252                    Discharge Planning   ZEKE: 5/31/2024     Code Status: Full Code   Is the patient medically ready for discharge?: No    Reason for patient still in hospital (select all that apply): Patient trending condition  Discharge Plan A: Skilled Nursing Facility                  Osito Dos Santos MD  Department of Hospital Medicine   [facility] - Telemetry Stepdown

## 2024-05-25 NOTE — ASSESSMENT & PLAN NOTE
Patient has hyponatremia which is uncontrolled,We will aim to correct the sodium by 4-6mEq in 24 hours. We will monitor sodium Daily. The hyponatremia is due to ESRD. Discussed with nephrology, dialysate bath adjusted to account for and correct hyponatremia.  Recent Labs   Lab 05/25/24  0741   *     IMPROVING  - Daily morning CMP  - Continue to montior

## 2024-05-26 LAB
POCT GLUCOSE: 188 MG/DL (ref 70–110)
POCT GLUCOSE: 204 MG/DL (ref 70–110)

## 2024-05-26 PROCEDURE — 97112 NEUROMUSCULAR REEDUCATION: CPT | Mod: CQ

## 2024-05-26 PROCEDURE — 20600001 HC STEP DOWN PRIVATE ROOM

## 2024-05-26 PROCEDURE — 63600175 PHARM REV CODE 636 W HCPCS

## 2024-05-26 PROCEDURE — 97530 THERAPEUTIC ACTIVITIES: CPT | Mod: CQ

## 2024-05-26 PROCEDURE — 25000242 PHARM REV CODE 250 ALT 637 W/ HCPCS

## 2024-05-26 PROCEDURE — 27000207 HC ISOLATION

## 2024-05-26 PROCEDURE — 25000003 PHARM REV CODE 250

## 2024-05-26 RX ADMIN — HEPARIN SODIUM 7500 UNITS: 5000 INJECTION INTRAVENOUS; SUBCUTANEOUS at 02:05

## 2024-05-26 RX ADMIN — INSULIN GLARGINE 10 UNITS: 100 INJECTION, SOLUTION SUBCUTANEOUS at 09:05

## 2024-05-26 RX ADMIN — Medication 500 MG: at 09:05

## 2024-05-26 RX ADMIN — HEPARIN SODIUM 7500 UNITS: 5000 INJECTION INTRAVENOUS; SUBCUTANEOUS at 05:05

## 2024-05-26 RX ADMIN — METOPROLOL TARTRATE 25 MG: 25 TABLET, FILM COATED ORAL at 09:05

## 2024-05-26 RX ADMIN — Medication 500 MG: at 11:05

## 2024-05-26 RX ADMIN — HEPARIN SODIUM 7500 UNITS: 5000 INJECTION INTRAVENOUS; SUBCUTANEOUS at 09:05

## 2024-05-26 RX ADMIN — METOPROLOL TARTRATE 25 MG: 25 TABLET, FILM COATED ORAL at 11:05

## 2024-05-26 RX ADMIN — ATORVASTATIN CALCIUM 40 MG: 40 TABLET, FILM COATED ORAL at 11:05

## 2024-05-26 RX ADMIN — ZINC SULFATE 220 MG (50 MG) CAPSULE 220 MG: CAPSULE at 11:05

## 2024-05-26 RX ADMIN — ASPIRIN 81 MG CHEWABLE TABLET 81 MG: 81 TABLET CHEWABLE at 11:05

## 2024-05-26 RX ADMIN — PSYLLIUM HUSK 1 PACKET: 3.4 POWDER ORAL at 11:05

## 2024-05-26 RX ADMIN — PANTOPRAZOLE SODIUM 40 MG: 40 GRANULE, DELAYED RELEASE ORAL at 11:05

## 2024-05-26 NOTE — CONSULTS
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    Hypermagnesemia 04/11/2024    POA, Mg 3.2  Daily chem       Morbid obesity     Necrotizing fasciitis        Social History     Socioeconomic History    Marital status: Single   Tobacco Use    Smoking status: Unknown     Social Determinants of Health     Financial Resource Strain: Patient Unable To Answer (3/14/2024)    Overall Financial Resource Strain (CARDIA)     Difficulty of Paying Living Expenses: Patient unable to answer   Recent Concern: Financial Resource Strain - High Risk (3/9/2024)    Overall Financial Resource Strain (CARDIA)     Difficulty of Paying Living Expenses: Very hard   Food Insecurity: Patient Unable To Answer (3/14/2024)    Hunger Vital Sign     Worried About Running Out of Food in the Last Year: Patient unable to answer     Ran Out of Food in the Last Year: Patient unable to answer   Transportation Needs: Patient Unable To Answer (3/14/2024)    PRAPARE - Transportation     Lack of Transportation (Medical): Patient unable to answer     Lack of Transportation (Non-Medical): Patient unable to answer   Physical Activity: Inactive (3/13/2024)    Exercise Vital Sign     Days of Exercise per Week: 0 days     Minutes of Exercise per Session: 0 min   Stress: Patient Unable To Answer (3/14/2024)    [employer] - Occupational Stress Questionnaire     Feeling of Stress : Patient unable to answer   Housing Stability: Patient Unable To Answer (3/14/2024)    Housing Stability Vital Sign     Unable to Pay for Housing in the Last Year: Patient unable to answer     Number of Places Lived in the Last Year: 1     Unstable Housing in the Last Year: Patient unable to answer   Recent Concern: Housing Stability - High  Risk (3/9/2024)    Housing Stability Vital Sign     Unable to Pay for Housing in the Last Year: Yes     Unstable Housing in the Last Year: No       Precautions:     Allergies as of 04/10/2024    (No Known Allergies)       [facility] Assessment Details/Treatment       Patient seen for wound care consultation. -placed by MD for PEG site. Pads beneath external bumper causing indentations to skin beneath.    Reviewed chart for this encounter.   See Flow Sheet for findings.    Pt is awake/ alert- nonverbal. Allowable for targeted assmt of PEG site at this time. PEG site peristomal skin is clear; however, beneath the external bumper, the round pads have caused indentations to the skin- the bumper is taut but able to apply a thin foam dressing to redistribute pressure of pads to not cause potential injury -or further marks to skin. Will return in a couple days to re evaluate for effectiveness- and consult IP skin integrity MARCOS for assmt/ eval as well.  Pt showed no s/s of discomfort at site- and tolerated care without distress noted.    RECOMMENDATIONS:  PEG site care every other day as follows: 1)clean with normal saline and pat dry. 2)cut a 4x4 Mepilex lite thin foam dressing in half and then cut again to main in form of drain sponge. 3)place beneath the external bumper at insertion site- no tape needed to secure. (Dressings left at the bedside).    Discussed POC with patient and MD per secure chat.  See EMR for orders & patient education.    Bedside nursing to continue care & monitoring.  Bedside nursing to maintain pressure injury prevention interventions.  Current documented Tomas score is 12 with a nutrition sub scale score of 2.     05/25/24 1300   WOCN Assessment   WOCN Total Time (mins) 30   Visit Date 05/25/24   Visit Time 1300   Consult Type New   WOCN Speciality Wound   Intervention assessed;applied;chart review;orders   Teaching on-going  (pt nonverbal- but interacts with motion; no true sign of received  comprehension.)   Positioning   Body Position position maintained   Head of Bed (HOB) Positioning HOB elevated   Positioning/Transfer Devices pillows   Pressure Injury Prevention    Check Medical Devices Done        Gastrostomy/Enterostomy 02/22/24 1200 LUQ   Placement Date/Time: 02/22/24 1200   Inserted by: MD  Location: LUQ   Securement other (see comments)   Dressing moist   Insertion Site tan drainage   Site Care site cleansed w/ sterile normal saline;sterile precut T-slit dressing applied  (thin/ lite foam dressing applied to redistribute pressure of pads beneath external bumper.)     Photos taken for reference:   PEG site     Will continue to follow as needed and/ or as directed until discharge.      Niki Jacobs BSN, RN, CWOCN  05/26/2024

## 2024-05-26 NOTE — PT/OT/SLP PROGRESS
Physical Therapy Treatment    Patient Name:  Sarah Saravia   MRN:  [id number]    Recommendations:     Discharge Recommendations: Moderate Intensity Therapy  Discharge Equipment Recommendations: lift device, hospital bed, wheelchair  Barriers to discharge:  evolving clinical presentation    Assessment:     Sarah Saravia is a 53 y.o. female admitted with a medical diagnosis of Acute cystitis with hematuria.  She presents with the following impairments/functional limitations: weakness, impaired endurance, impaired self care skills, impaired functional mobility, impaired balance, decreased coordination, decreased upper extremity function, decreased lower extremity function, decreased safety awareness, pain, decreased ROM, impaired coordination Pt unable to tolerate sit<>stand transitions today due to pain based on facial grimace. Pt requires max/total assistance with sit <> stand transitions, OOB/BTB transfers, and sitting balance to prevent falls due to weakness, instability, pain, fatigue, general deconditioning and lack of attentiveness. Pt continues to show interest and motivation with PT treatment sessions. In light of pt's current condition, it is recommended they participate in moderate intensity therapy program with PT and OT skilled services to gain maximum return of function. Pt can continue to benefit from skilled therapy to increase endurance, mobility, balance, and return to PLOF.       Rehab Prognosis: Fair; patient would benefit from acute skilled PT services to address these deficits and reach maximum level of function.    Recent Surgery: * No surgery found *      Plan:     During this hospitalization, patient to be seen 3 x/week to address the identified rehab impairments via therapeutic activities, therapeutic exercises, neuromuscular re-education and progress toward the following goals:    Plan of Care Expires:  05/22/24    Subjective     Chief Complaint: pt agreeable to  therapy    Pain/Comfort:  Pain Rating 1:  (pt grimaced with activity)  Location - Side 1: Bilateral  Location - Orientation 1: generalized  Pain Addressed 1: Reposition, Distraction, Cessation of Activity      Objective:     Communicated with nurse (Daniela) prior to session.  Patient found supine with peripheral IV, pressure relief boots, PureWick, telemetry (specialty pressure relief air mattress, boyfriend present) upon PT entry to room.     General Precautions: Standard, aphasia, aspiration, fall, NPO, contact (ESBL/MDRO in urine)  Orthopedic Precautions: N/A  Braces: N/A  Respiratory Status: Room air     Functional Mobility:  Bed Mobility:     Rolling Left:  max A x2, HOB flat, use of L rail for hygiene   Rolling Right: max A x2, HOB flat, use of R rail for hygiene  Scooting: anteriorly to the EOB max A x2 via draw sheet  Supine to Sit: max A x2 for trunk elevation via HHA and LE clearance  Sit to Supine: total A x2 for LE clearance and trunk control  Transfers:     Sit to Stand:  attempted from EOB total A x3 with hand-held assist and draw sheet, however pt unable to complete stand 2/2 trials  Bed to Med-Chair: total A x3 via draw sheet and SB  Balance: static sitting at the EOB x5min CGA to SBA (total sitting time at the EOB 12 min). Pt unable to complete command following when asked; however, pt spontaneously reaching with UE's and kicking with LE's when boyfriend places hand out to encourage her.      AM-PAC 6 CLICK MOBILITY  Turning over in bed (including adjusting bedclothes, sheets and blankets)?: 2  Sitting down on and standing up from a chair with arms (e.g., wheelchair, bedside commode, etc.): 1  Moving from lying on back to sitting on the side of the bed?: 2  Moving to and from a bed to a chair (including a wheelchair)?: 1  Need to walk in hospital room?: 1  Climbing 3-5 steps with a railing?: 1  Basic Mobility Total Score: 8       Treatment & Education:  Pt educated on the importance of getting OOB and  spending time sitting on the EOB. Pt was educated on the effects of immobility and moving throughout the day to decrease recovery time and promote healing. Pt was instructed to call for hospital staff assistance when needed and not to get up without assistance.       Patient left up in chair with all lines intact, call button in reach, nurse notified, and boyfriend present..    GOALS:   Multidisciplinary Problems       Physical Therapy Goals          Problem: Physical Therapy    Goal Priority Disciplines Outcome Goal Variances Interventions   Physical Therapy Goal     PT, PT/OT Progressing     Description: Goals to be met by: 24   Goals remain appropriate 5/3/2024 to be met by 24  Goals updated 2024 to be met by 24    Patient will increase functional independence with mobility by performin. Supine to sit with Maximum Assistance  2. Sit to supine with Maximum Assistance  3. Rolling to Left and Right with Moderate Assistance.  4. Sitting at edge of bed 5 minutes with Moderate Assistance- MET , monitor consistency  5. Lower extremity exercise program x20 reps per handout, with assistance as needed                         Time Tracking:     PT Received On: 24  PT Start Time: 936     PT Stop Time:   PT Total Time (min): 47 min     Billable Minutes: Therapeutic Activity 37 and Neuromuscular Re-education 10    Treatment Type: Treatment  PT/PTA: PTA     Number of PTA visits since last PT visit: 2024

## 2024-05-26 NOTE — ASSESSMENT & PLAN NOTE
Patient's FSGs are controlled on current medication regimen.  Last A1c reviewed-   Lab Results   Component Value Date    HGBA1C 10.4 (H) 01/30/2024     Most recent fingerstick glucose reviewed-   Recent Labs   Lab 05/25/24  1134 05/25/24  1624 05/25/24  2137   POCTGLUCOSE 195* 169* 161*       Current correctional scale  Medium  Maintain anti-hyperglycemic dose as follows-   Antihyperglycemics (From admission, onward)    Start     Stop Route Frequency Ordered    05/25/24 2100  insulin glargine U-100 (Lantus) pen 10 Units         -- SubQ Nightly 05/25/24 1035        Hold Oral hypoglycemics while patient is in the hospital.  POCT glucose q6h

## 2024-05-26 NOTE — PROGRESS NOTES
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 History was given from patient's daughter. She was undergoing dialysis today and noticed she was lethargic, less alert than usual self. Pt completed dialysis and still not acting herself. EMS was called, fever of a 100.  On chart review, she did have an episode of large volume emesis around 1700.  Per EMS, she had a slightly elevated temp 100.0°F, glucose 300s.     In the ED: UA 2+ leuks, >100 WBC, many bacteria, WBC 17, CT abd/pelvis concerning for cystitis. Given vanc/zosyn    Overview/Hospital Course:  Patient was admitted to Hospital Medicine service for medical management and evaluation of urosepsis. Patient was continued on vanc/zosyn. Vascular Neurology consulted concerning mental status change with the recommendation to initiate ASA 81 QD and to obtain an MRI to r/o new stroke. Imaging pending. Nephrology was consulted for regularly scheduled dialysis. Afternoon of 4/12, patient was unable to tolerate filtration of volume during dialsis 2/2 hypotension. Patient received 500cc bolus and was transferred back to floor after finishing the session without volume removed. Will monitor for signs of volume overload. Tailoring insulin regimen while uptitrating tube feeds to goal rate. Staph Epi in all 4 bottles; ID with recommendation to continue Vanc & de-escalate meropenem to ertapenem on 04/15 through 4/16. Patient completed IV course of UTI coverage. Patient tolerated volume removal well in dialysis  throughout the rest of her hospital stay. Pending discharge to LTACH pending bed availability. Patient without BM with one episode of vomiting overnight 4/17. KUB with bowel gas and low concern for obstruction with no transition point noted. Escalating bowel regimen. Pending placement as of 4/22. Pulm consult placed to evaluate for possible trach downsize & eventual decannulation to assist placement if safe. Trach capping trial per pulm for 48h and will assess for decannulation on Monday. DVT studies negative. Pulm successfully decannulated pt 4/30, IR exchanged TDC. Pending swallow study with SLP and waiting for dispo options. Pt failed swallow study, placement pending. Working with PT on mobilize pt to sitting in a chair to expand placement options. Pt successfully mobilized using sandee lift but required 2 people to do so. Discussing dispo plan with family, likely d/c home if HD, DME needs able to be met. Pending acceptance at outpatient HD center. Ongoing placement difficulties d/t need for accepting HD facility to have sandee lift with 2 personnel to operate. Family meeting to discuss disposition options planned for Thursday 5/23 at 1 PM. Per family meeting referral sent to [facility], pending placement    Interval History: NAEON. Patient awake and alert this morning. SBP 120s-140s with HR 100s, on room air. Labs with Na 128, K 3.5. Glucose at goal. Osvaldo at bedside this morning. Addressed all questions and concerns. Patient medically stable and awaiting placement      Objective:     Vital Signs (Most Recent):  Temp: 99.1 °F (37.3 °C) (05/26/24 0400)  Pulse: 100 (05/26/24 0400)  Resp: 15 (05/26/24 0400)  BP: (!) 141/83 (05/26/24 0400)  SpO2: 100 % (05/26/24 0400) Vital Signs (24h Range):  Temp:  [98 °F (36.7 °C)-99.9 °F (37.7 °C)] 99.1 °F (37.3 °C)  Pulse:  [] 100  Resp:  [14-18] 15  SpO2:  [98 %-100 %] 100 %  BP: (117-147)/(57-83) 141/83     Weight: 105.5 kg (232 lb 9.4 oz)  Body mass index is 36.43  kg/m².    Intake/Output Summary (Last 24 hours) at 5/26/2024 0824  Last data filed at 5/25/2024 1500  Gross per 24 hour   Intake 1080 ml   Output 0 ml   Net 1080 ml         Physical Exam  Vitals and nursing note reviewed.   Constitutional:       General: She is not in acute distress.     Appearance: She is not toxic-appearing or diaphoretic.   HENT:      Head: Normocephalic and atraumatic.   Eyes:      General:         Right eye: No discharge.         Left eye: No discharge.      Conjunctiva/sclera: Conjunctivae normal.   Neck:      Comments: Decannulated  Cardiovascular:      Rate and Rhythm: Normal rate and regular rhythm.      Heart sounds: Normal heart sounds.   Pulmonary:      Effort: Pulmonary effort is normal. No respiratory distress.   Abdominal:      General: Abdomen is flat. There is no distension.      Tenderness: There is no abdominal tenderness.      Comments: PEG tube in place with new skin breakdown and retraction, barrier in place   Musculoskeletal:      Right lower leg: No edema.      Left lower leg: No edema.   Skin:     General: Skin is warm and dry.   Neurological:      General: No focal deficit present.      Mental Status: She is alert.      Comments: Nonverbal             Significant Labs: All pertinent labs within the past 24 hours have been reviewed.    Significant Imaging: I have reviewed all pertinent imaging results/findings within the past 24 hours.    Assessment/Plan:      * Acute cystitis with hematuria  resolved    Pt presenting with AMS and worsening lethargy. Pt is non-verbal at baseline, does not follow commands, but was less responsive than normal. Pt found to have a fever. Urinary source suspected based off of urine and CT abd/pelvis. Pt has many prior resistant bacterial infections including urinary sources. Has prior with sensitivity to zosyn and has also improved off ED dose of vanc/zosyn. Lactic elevated a 3.8->3.49 prior to completion of fluid bolus 500ml.    Patient with new  fever and tachycardia on 4/25. Low threshold to initiate additional infectious workup and repeat UA.     Plan  S/p course of vanc/ertapenem per ID. Course completed 4/16.  Daily cbc  Contact precautions    *on contact precautions indefinitely while patient still makes urine given pt incontinent per infection control    Debility  Needs:  Wheelchair: patient has a mobility limitation that significantly impairs her ability to participate in one or more mobility related activities of daily living (MRADL's) such as toileting, feeding, dressing, grooming, and bathing in customary locations in the home.  The mobility limitation cannot be sufficiently resolved by the use of a cane or walker.   The use of a manual wheelchair will significantly improve the patient's ability to participate in MRADLS and the patient will use it on regular basis in the home.  Family/pt has expressed her willingness to use a manual wheelchair in the home.  She also has a caregiver who is capable of assisting in mobility.    Mrs Saravia requires a hospital bed due to her requiring positioning of the body in ways not feasible with an ordinary bed to alleviate pain/ is completely immobile /or limited mobility and cannot independently make changes in body position without the use of the bed.  The positioning of the body cannot be sufficiently resolved by the use of pillows and wedges    Patient has a mobility limitation that significantly impairs their ability to participate in one or more mobility related activities of daily living, including toileting. This deficit can be resolved by using a bedside commode. Patient demonstrates mobility limitations that will cause them to be confined to one room at home without bathroom access for up to 30 days. Using a bedside commode will greatly improve the patient's ability to participate in MRADLs       Complication of vascular dialysis catheter  Cath intermittently clogging, not resolving with cath-hedy, going  for IR venogram 4/30 with possible exchange. S/p exchange with IR on 4/30      Constipation  RESOLVED with Bowel regimen  Patient without BM x5d. One episode of vomitus night of 4/17. Some concern for bowel obstruction. Tolerating continuous tube feeds at goal rate with 0cc on residuals. Physical exam with bowel sounds, soft nontender abdomen. KUB without concern for obstruction with bowel gas, lack of transition point.    Patient now with regular bowel movements on bowel regimen.     Plan  - Miralax BID, Senna BID  - compazine PRN    Moderate malnutrition  Nutrition consulted. Most recent weight and BMI monitored-     Measurements:  Wt Readings from Last 1 Encounters:   05/22/24 105.5 kg (232 lb 9.4 oz)   Body mass index is 36.43 kg/m².    Patient has been screened and assessed by RD.    Malnutrition Type:  Context: acute illness or injury  Level: moderate    Malnutrition Characteristic Summary:  Weight Loss (Malnutrition): 10% in 6 months  Subcutaneous Fat (Malnutrition): mild depletion  Muscle Mass (Malnutrition): mild depletion  Fluid Accumulation (Malnutrition): mild    Interventions/Recommendations (treatment strategy):  1.    -- TF  -- going for swallow study with SLP to assess possibility of pleasure oral feedings --> failed repeatedly    Prolonged QT interval  Qtc 526 [04/10/24]      limit Qtc prolonging drugs as able    Spastic hemiplegia of right dominant side as late effect of cerebrovascular disease  Important that patient is able to sit in chair for 4h for dialysis placement needs, even if she requires lift/assistance getting into the chair     This patient has Chronic right hemiplegia due to stroke. Physical therapy services has been scheduled. Continue all standard measures for pressure injury prevention and consult wound care for any wounds (chronic or acute).    ESRD (end stage renal disease) on dialysis  Creatine stable for now. BMP reviewed- noted Estimated Creatinine Clearance: 20.3 mL/min (A)  "(based on SCr of 4 mg/dL (H)). according to latest data. Based on current GFR, CKD stage is end stage.  Monitor UOP and serial BMP and adjust therapy as needed. Renally dose meds. Avoid nephrotoxic medications and procedures.    -- HD MWF  -- nephro following    Status post tracheostomy  Resolved, decannulated 4/30    Acute encephalopathy  Pt is non-verbal and does not follow commands at baseline. Vascular Neurology consulted given acute change from baseline. MRI with concern for evolution of prior strokes with noted differential of T2 hyperintensities including vasculitis vs demyelination. Discussed with Vascular Neurology; low concern for ongoing vasculitis/demyelination, likely represents typical evolution of prior infarcts.    Patient at baseline as of 4/22.     Plan  RESOLVED  - Repeat head imaging if concern of new change in mental status/focal deficit    Type 2 diabetes mellitus with hyperglycemia, with long-term current use of insulin  Patient's FSGs are controlled on current medication regimen.  Last A1c reviewed-   Lab Results   Component Value Date    HGBA1C 10.4 (H) 01/30/2024     Most recent fingerstick glucose reviewed-   Recent Labs   Lab 05/25/24  1134 05/25/24  1624 05/25/24  2137   POCTGLUCOSE 195* 169* 161*       Current correctional scale  Medium  Maintain anti-hyperglycemic dose as follows-   Antihyperglycemics (From admission, onward)      Start     Stop Route Frequency Ordered    05/25/24 2100  insulin glargine U-100 (Lantus) pen 10 Units         -- SubQ Nightly 05/25/24 1035          Hold Oral hypoglycemics while patient is in the hospital.  POCT glucose q6h    Hyponatremia  Patient has hyponatremia which is uncontrolled,We will aim to correct the sodium by 4-6mEq in 24 hours. We will monitor sodium Daily. The hyponatremia is due to ESRD. Discussed with nephrology, dialysate bath adjusted to account for and correct hyponatremia.  No results for input(s): "NA" in the last 24 " hours.  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 Pt underwent extensive course, currently still healing from this, but no longer has wound vac. Pt's wound currently covered with bandage. Picture in media tabs    Plan  Wound care        VTE Risk Mitigation (From admission, onward)           Ordered     heparin (porcine) injection 1,000 Units  As needed (PRN)         05/21/24 1017     heparin (porcine) injection 3,000 Units  As needed (PRN)         04/17/24 0825     heparin (porcine) injection 7,500 Units  Every 8 hours         04/11/24 0252                    Discharge Planning   ZEKE: 5/31/2024     Code Status: Full Code   Is the patient medically ready for discharge?: No    Reason for patient still in hospital (select all that apply): Pending disposition  Discharge Plan A: Skilled Nursing Facility                  Jarrett Eason DO  Department of Hospital Medicine   [facility] - Telemetry Stepdown

## 2024-05-26 NOTE — SUBJECTIVE & OBJECTIVE
Interval History: NAEON. Patient awake and alert this morning. SBP 120s-140s with HR 100s, on room air. Labs with Na 128, K 3.5. Glucose at goal. Osvaldo at bedside this morning. Addressed all questions and concerns. Patient medically stable and awaiting placement      Objective:     Vital Signs (Most Recent):  Temp: 99.1 °F (37.3 °C) (05/26/24 0400)  Pulse: 100 (05/26/24 0400)  Resp: 15 (05/26/24 0400)  BP: (!) 141/83 (05/26/24 0400)  SpO2: 100 % (05/26/24 0400) Vital Signs (24h Range):  Temp:  [98 °F (36.7 °C)-99.9 °F (37.7 °C)] 99.1 °F (37.3 °C)  Pulse:  [] 100  Resp:  [14-18] 15  SpO2:  [98 %-100 %] 100 %  BP: (117-147)/(57-83) 141/83     Weight: 105.5 kg (232 lb 9.4 oz)  Body mass index is 36.43 kg/m².    Intake/Output Summary (Last 24 hours) at 5/26/2024 0824  Last data filed at 5/25/2024 1500  Gross per 24 hour   Intake 1080 ml   Output 0 ml   Net 1080 ml         Physical Exam  Vitals and nursing note reviewed.   Constitutional:       General: She is not in acute distress.     Appearance: She is not toxic-appearing or diaphoretic.   HENT:      Head: Normocephalic and atraumatic.   Eyes:      General:         Right eye: No discharge.         Left eye: No discharge.      Conjunctiva/sclera: Conjunctivae normal.   Neck:      Comments: Decannulated  Cardiovascular:      Rate and Rhythm: Normal rate and regular rhythm.      Heart sounds: Normal heart sounds.   Pulmonary:      Effort: Pulmonary effort is normal. No respiratory distress.   Abdominal:      General: Abdomen is flat. There is no distension.      Tenderness: There is no abdominal tenderness.      Comments: PEG tube in place with new skin breakdown and retraction, barrier in place   Musculoskeletal:      Right lower leg: No edema.      Left lower leg: No edema.   Skin:     General: Skin is warm and dry.   Neurological:      General: No focal deficit present.      Mental Status: She is alert.      Comments: Nonverbal             Significant Labs: All  pertinent labs within the past 24 hours have been reviewed.    Significant Imaging: I have reviewed all pertinent imaging results/findings within the past 24 hours.

## 2024-05-27 LAB
ALBUMIN SERPL BCP-MCNC: 3.1 G/DL (ref 3.5–5.2)
ANION GAP SERPL CALC-SCNC: 17 MMOL/L (ref 8–16)
BASOPHILS # BLD AUTO: 0.04 K/UL (ref 0–0.2)
BASOPHILS NFR BLD: 0.4 % (ref 0–1.9)
BUN SERPL-MCNC: 67 MG/DL (ref 6–20)
CALCIUM SERPL-MCNC: 10.3 MG/DL (ref 8.7–10.5)
CHLORIDE SERPL-SCNC: 87 MMOL/L (ref 95–110)
CO2 SERPL-SCNC: 24 MMOL/L (ref 23–29)
CREAT SERPL-MCNC: 6.9 MG/DL (ref 0.5–1.4)
DIFFERENTIAL METHOD BLD: ABNORMAL
EOSINOPHIL # BLD AUTO: 0.4 K/UL (ref 0–0.5)
EOSINOPHIL NFR BLD: 3.5 % (ref 0–8)
ERYTHROCYTE [DISTWIDTH] IN BLOOD BY AUTOMATED COUNT: 15.1 % (ref 11.5–14.5)
EST. GFR  (NO RACE VARIABLE): 6.6 ML/MIN/1.73 M^2
ESTIMATED AVG GLUCOSE: 131 MG/DL (ref 68–131)
GLUCOSE SERPL-MCNC: 173 MG/DL (ref 70–110)
HBA1C MFR BLD: 6.2 % (ref 4–5.6)
HCT VFR BLD AUTO: 30 % (ref 37–48.5)
HGB BLD-MCNC: 9.3 G/DL (ref 12–16)
IMM GRANULOCYTES # BLD AUTO: 0.05 K/UL (ref 0–0.04)
IMM GRANULOCYTES NFR BLD AUTO: 0.4 % (ref 0–0.5)
LYMPHOCYTES # BLD AUTO: 1.7 K/UL (ref 1–4.8)
LYMPHOCYTES NFR BLD: 15.1 % (ref 18–48)
MCH RBC QN AUTO: 26 PG (ref 27–31)
MCHC RBC AUTO-ENTMCNC: 31 G/DL (ref 32–36)
MCV RBC AUTO: 84 FL (ref 82–98)
MONOCYTES # BLD AUTO: 0.9 K/UL (ref 0.3–1)
MONOCYTES NFR BLD: 8.1 % (ref 4–15)
NEUTROPHILS # BLD AUTO: 8.2 K/UL (ref 1.8–7.7)
NEUTROPHILS NFR BLD: 72.5 % (ref 38–73)
NRBC BLD-RTO: 0 /100 WBC
PHOSPHATE SERPL-MCNC: 5.7 MG/DL (ref 2.7–4.5)
PLATELET # BLD AUTO: 357 K/UL (ref 150–450)
PMV BLD AUTO: 9.5 FL (ref 9.2–12.9)
POCT GLUCOSE: 126 MG/DL (ref 70–110)
POCT GLUCOSE: 132 MG/DL (ref 70–110)
POCT GLUCOSE: 152 MG/DL (ref 70–110)
POCT GLUCOSE: 165 MG/DL (ref 70–110)
POCT GLUCOSE: 203 MG/DL (ref 70–110)
POTASSIUM SERPL-SCNC: 4.6 MMOL/L (ref 3.5–5.1)
RBC # BLD AUTO: 3.58 M/UL (ref 4–5.4)
SODIUM SERPL-SCNC: 128 MMOL/L (ref 136–145)
WBC # BLD AUTO: 11.29 K/UL (ref 3.9–12.7)

## 2024-05-27 PROCEDURE — 80069 RENAL FUNCTION PANEL: CPT

## 2024-05-27 PROCEDURE — 92526 ORAL FUNCTION THERAPY: CPT

## 2024-05-27 PROCEDURE — 25000003 PHARM REV CODE 250: Performed by: NURSE PRACTITIONER

## 2024-05-27 PROCEDURE — 80100014 HC HEMODIALYSIS 1:1

## 2024-05-27 PROCEDURE — 63600175 PHARM REV CODE 636 W HCPCS: Performed by: INTERNAL MEDICINE

## 2024-05-27 PROCEDURE — 25000003 PHARM REV CODE 250

## 2024-05-27 PROCEDURE — 63600175 PHARM REV CODE 636 W HCPCS: Performed by: NURSE PRACTITIONER

## 2024-05-27 PROCEDURE — 20600001 HC STEP DOWN PRIVATE ROOM

## 2024-05-27 PROCEDURE — 36415 COLL VENOUS BLD VENIPUNCTURE: CPT

## 2024-05-27 PROCEDURE — 97535 SELF CARE MNGMENT TRAINING: CPT

## 2024-05-27 PROCEDURE — 97530 THERAPEUTIC ACTIVITIES: CPT

## 2024-05-27 PROCEDURE — 63600175 PHARM REV CODE 636 W HCPCS

## 2024-05-27 PROCEDURE — 90935 HEMODIALYSIS ONE EVALUATION: CPT | Mod: ,,, | Performed by: NURSE PRACTITIONER

## 2024-05-27 PROCEDURE — 27000207 HC ISOLATION

## 2024-05-27 PROCEDURE — 85025 COMPLETE CBC W/AUTO DIFF WBC: CPT

## 2024-05-27 PROCEDURE — 83036 HEMOGLOBIN GLYCOSYLATED A1C: CPT

## 2024-05-27 RX ORDER — INSULIN ASPART 100 [IU]/ML
0-5 INJECTION, SOLUTION INTRAVENOUS; SUBCUTANEOUS
Status: DISCONTINUED | OUTPATIENT
Start: 2024-05-27 | End: 2024-06-09

## 2024-05-27 RX ADMIN — SODIUM CHLORIDE: 9 INJECTION, SOLUTION INTRAVENOUS at 07:05

## 2024-05-27 RX ADMIN — ZINC SULFATE 220 MG (50 MG) CAPSULE 220 MG: CAPSULE at 02:05

## 2024-05-27 RX ADMIN — ASPIRIN 81 MG CHEWABLE TABLET 81 MG: 81 TABLET CHEWABLE at 02:05

## 2024-05-27 RX ADMIN — Medication 500 MG: at 08:05

## 2024-05-27 RX ADMIN — HEPARIN SODIUM 7500 UNITS: 5000 INJECTION INTRAVENOUS; SUBCUTANEOUS at 09:05

## 2024-05-27 RX ADMIN — HEPARIN SODIUM 7500 UNITS: 5000 INJECTION INTRAVENOUS; SUBCUTANEOUS at 02:05

## 2024-05-27 RX ADMIN — ERYTHROPOIETIN 6100 UNITS: 10000 INJECTION, SOLUTION INTRAVENOUS; SUBCUTANEOUS at 11:05

## 2024-05-27 RX ADMIN — INSULIN GLARGINE 10 UNITS: 100 INJECTION, SOLUTION SUBCUTANEOUS at 08:05

## 2024-05-27 RX ADMIN — HEPARIN SODIUM 3000 UNITS: 1000 INJECTION, SOLUTION INTRAVENOUS; SUBCUTANEOUS at 07:05

## 2024-05-27 RX ADMIN — PANTOPRAZOLE SODIUM 40 MG: 40 GRANULE, DELAYED RELEASE ORAL at 02:05

## 2024-05-27 RX ADMIN — ATORVASTATIN CALCIUM 40 MG: 40 TABLET, FILM COATED ORAL at 02:05

## 2024-05-27 RX ADMIN — HEPARIN SODIUM 1000 UNITS: 1000 INJECTION, SOLUTION INTRAVENOUS; SUBCUTANEOUS at 11:05

## 2024-05-27 RX ADMIN — HEPARIN SODIUM 7500 UNITS: 5000 INJECTION INTRAVENOUS; SUBCUTANEOUS at 05:05

## 2024-05-27 RX ADMIN — METOPROLOL TARTRATE 25 MG: 25 TABLET, FILM COATED ORAL at 08:05

## 2024-05-27 NOTE — PT/OT/SLP PROGRESS
Occupational Therapy   Treatment    Other Staff Present: Rehab Scout Elam    Name: Sarah Saravia  MRN: [id number]  Admitting Diagnosis:  Acute cystitis with hematuria       Recommendations:     Discharge Recommendations: Moderate Intensity Therapy  Discharge Equipment Recommendations:  lift device, hospital bed, wheelchair  Barriers to discharge:   (increased skilled A needed)    Assessment:     Sarah Saravia is a 53 y.o. female with a medical diagnosis of Acute cystitis with hematuria.  She presents with performance deficits affecting function are weakness, impaired endurance, impaired self care skills, impaired functional mobility, gait instability, impaired balance, pain, decreased lower extremity function, decreased upper extremity function, decreased ROM, decreased safety awareness, decreased coordination.     Pt engaged fairly in therapy this date, required total A of 2 persons for bed mobility and STS transfer across 4 trials with total A of 2 persons with BLE knee blocking and ~10% hip clearance from bed each time. Pt's daughter present to offer additional encouragement this date.     Rehab Prognosis:  Good; patient would benefit from acute skilled OT services to address these deficits and reach maximum level of function.       Plan:     Patient to be seen 3 x/week to address the above listed problems via self-care/home management, therapeutic activities, therapeutic exercises, neuromuscular re-education  Plan of Care Expires: 06/13/24  Plan of Care Reviewed with: patient, daughter    Subjective     Chief Complaint: No complaints  Patient/Family Comments/goals: Get better, return home   Pain/Comfort:  Pain Rating 1:  did not communicate pain  Location - Side 1: Bilateral  Location - Orientation 1: generalized  Location 1: knee  Pain Addressed 1: Reposition, Distraction, Cessation of Activity  Pain Rating Post-Intervention 1:  not rated, rubbed left knee    Objective:     Communicated with: RN prior to  session.  Patient found HOB elevated with peripheral IV, pressure relief boots, PureWick, telemetry (pressure relief mattress) upon OT entry to room.    General Precautions: Standard, aspiration, fall, NPO    Orthopedic Precautions:N/A  Braces: N/A  Respiratory Status: Room air     Occupational Performance:     Bed Mobility:    Patient completed Rolling/Turning to Left with  total assistance and 2 persons  Patient completed Rolling/Turning to Right with total assistance and 2 persons  Patient completed Scooting/Bridging:   To EOB while sitting: with total assistance and 2 persons  To HOB while sitting with total A of 2 persons, angling hips during descent to bed  To HOB while supine with bed in trendelenburg: total A of 2 persons  Patient completed Supine to Sit with total assistance and 2 persons  Patient completed Sit to Supine with total assistance and 2 persons   Pt able to sit EOB with max A to SBA ~15min    Functional Mobility/Transfers:  Patient completed Sit <> Stand Transfer with total assistance and of 2 persons  with  no assistive device and hand-held assist  with BLE knee blocking across 4 trials and forward flexed position, with 10% hip clearance from bed across each trial    Activities of Daily Living:  Feeding:  total assistance managing PEG tube  Upper Body Dressing: total assistance donning 2nd gown at back, affixing ties at neck/back   Lower Body Dressing: total assistance adjusting bilateral nonslip socks   Toileting: total assistance refreshing lito pads       [facility] 6 Click ADL: 8    Treatment & Education:  Pt educated on role of OT, POC, and goals for therapy.    POC was dicussed with patient/caregiver, who was included in its development and is in agreement with the identified goals and treatment plan.   Patient and family aware of patient's deficits and therapy progression.   Time provided for therapeutic counseling and discussion of health disposition.   Educated on importance of EOB/OOB  mobility, maintaining routine, sitting up in chair, and maximizing independence with ADLs during admission   Pt completed ADLs and functional mobility for treatment session as noted above   Pt/caregiver verbalized understanding and expressed no further concerns/questions.  Updated communication board with level of assist required      Patient left HOB elevated with all lines intact, bed alarm on, and RN and daughter present    GOALS:   Multidisciplinary Problems       Occupational Therapy Goals          Problem: Occupational Therapy    Goal Priority Disciplines Outcome Interventions   Occupational Therapy Goal     OT, PT/OT Progressing    Description: Goals to be met by: 5/22/24 (extend to 6/19/2024)    Patient will increase functional independence with ADLs by performing:    UE Dressing with Maximum Assistance.  Grooming while EOB with Maximum Assistance.  Sitting at edge of bed x5 minutes with Maximum Assistance. - Met 5/22  Rolling to Bilateral with Moderate Assistance.   Supine to sit with Maximum Assistance.                         Time Tracking:     OT Date of Treatment: 05/27/24  OT Start Time: 1408  OT Stop Time: 1440  OT Total Time (min): 32 min    Billable Minutes:Self Care/Home Management 10  Therapeutic Activity 22    OT/JOSÉ: OT     Number of JOSÉ visits since last OT visit: 1    5/27/2024

## 2024-05-27 NOTE — PROGRESS NOTES
[facility] NEPHROLOGY STAFF HEMODIALYSIS NOTE     Patient currently on hemodialysis for removal of uremic toxins and volume.     Patient seen and evaluated on hemodialysis, tolerating treatment, see HD flowsheet for vitals and assessments.    Labs have been reviewed and the dialysate bath has been adjusted.       Assessment/Plan:    -Pending placement   -Patient seen on HD, tolerating treatment well, w/o complaints   -UF goal of 2L  -- NA bath adjusted   -Renal diet, if not NPO   -Strict I/O's and daily weights  -Daily renal function panels  -Keep MAP >65 while on HD   -Hgb goal 10-11, continue epo   -Will continue to follow while inpatient     Delfina Shi DNP-FNP, C  Nephrology  Pager: [phone]

## 2024-05-27 NOTE — ASSESSMENT & PLAN NOTE
Creatine stable for now. BMP reviewed- noted Estimated Creatinine Clearance: 11.8 mL/min (A) (based on SCr of 6.9 mg/dL (H)). according to latest data. Based on current GFR, CKD stage is end stage.  Monitor UOP and serial BMP and adjust therapy as needed. Renally dose meds. Avoid nephrotoxic medications and procedures.    -- HD MWF  -- nephro following

## 2024-05-27 NOTE — PROGRESS NOTES
Dialysis completed. Right IJ tunneled CVC flushed, heparinized, capped and ends wrapped with sterile gauzed. Dialyzed for 3.5 hours with fluid removal of 1.5 liters. Tolerated well with stable vital signs. Returned to her room by bed.

## 2024-05-27 NOTE — NURSING
Nurses Note -- 4 Eyes      5/27/2024   6:53 PM      Skin assessed during: Q Shift Change      [] No Altered Skin Integrity Present    []Prevention Measures Documented      [x] Yes- Altered Skin Integrity Present or Discovered   [] LDA Added if Not in Epic (Describe Wound)   [] New Altered Skin Integrity was Present on Admit and Documented in LDA   [] Wound Image Taken    Wound Care Consulted? Yes    Attending Nurse:  Pat Eastman RN/Staff Member: Alma

## 2024-05-27 NOTE — PT/OT/SLP PROGRESS
Speech Language Pathology Treatment    Patient Name:  Sarah Saravia   MRN:  [id number]  Admitting Diagnosis: Acute cystitis with hematuria    Recommendations:                 General Recommendations:  Dysphagia therapy and Speech/language therapy  Diet recommendations:  NPO, Liquid Diet Level: NPO Pt may receive thin water for comfort/pleasure.  Aspiration Precautions: Continue alternate means of nutrition, HOB to 90 degrees, Monitor for s/s of aspiration, and Strict aspiration precautions   General Precautions: Standard, aphasia, aspiration, fall, NPO  Communication strategies:  yes/no questions only and go to room if call light pushed    Assessment:     Sarah Saravia is a 53 y.o. female with an SLP diagnosis of Aphasia, Dysphagia, and Cognitive-Linguistic Impairment.     Subjective         Pain/Comfort:  Pain Rating 1:  (no indications of pain)    Respiratory Status: Room air    Objective:     Has the patient been evaluated by SLP for swallowing?   Yes  Keep patient NPO? Yes   Current Respiratory Status:        Pt seen for ongoing indirect dysphagia therapy. Daughter present.  Pt awake/alert. Remained nonverbal t/o session.  Pt accepted straw sips of water x 6 and alternating bites of 1/2 tsp applesauce x 3.  Pt noted to hold pureed boluses for 30+ seconds.  Pt usually required straw sip of water to ensure initiation of swallow. Pt also noted to perform piecemeal deglutition for both thin liquids and purees.  No overt s/s of aspiration observed, but pt remains at risk for aspiration 2/2 prolonged holding.  SLP continues to recommend that pt remain NPO except for small sips of thin water.  Will assess pt with orange juice pt/daughter request on next service date.     Goals:   Multidisciplinary Problems       SLP Goals          Problem: SLP    Goal Priority Disciplines Outcome   SLP Goal     SLP    Description: Speech Language Pathology Goals  Updated goals expected to be met by 5/10 (goals remain appropriate  5/17 - reassess on 5/24:  1. Pt will participate in ongoing swallowing assessment to determine if appropriate for PO trials for pleasure.   2. Pt will model single step command x 1 given max cues.   3. Pt will answer simple yes/no q's during a structured receptive language task x 1 given max cues.   4. Pt will phonate purposefully upon command x 1 given max cues.   5. Pt will attempt to vocalize/verbalize during automatic speech task x 1 given max cues.   6. Pt will participate in evaluation of ability to utilize basic communication board.     Goals expected to be met by 5/8:  1. Pt will participate in Modified Barium Swallow Study to determine if safe for oral intake. Goal met/attempted 5/2                               Plan:     Patient to be seen:  3 x/week   Plan of Care expires:  05/31/24  Plan of Care reviewed with:  patient, daughter   SLP Follow-Up:  Yes       Discharge recommendations:  Moderate Intensity Therapy       Time Tracking:     SLP Treatment Date:   05/27/24  Speech Start Time:  1352  Speech Stop Time:  1406     Speech Total Time (min):  14 min    Billable Minutes: Treatment Swallowing Dysfunction 14    05/27/2024

## 2024-05-27 NOTE — PLAN OF CARE
Pt engaged well in therapy this date.     Problem: Occupational Therapy  Goal: Occupational Therapy Goal  Description: Goals to be met by: 5/22/24 (extend to 6/19/2024)    Patient will increase functional independence with ADLs by performing:    UE Dressing with Maximum Assistance.  Grooming while EOB with Maximum Assistance.  Sitting at edge of bed x5 minutes with Maximum Assistance. - Met 5/22  Rolling to Bilateral with Moderate Assistance.   Supine to sit with Maximum Assistance.    Outcome: Progressing

## 2024-05-27 NOTE — ASSESSMENT & PLAN NOTE
Patient has hyponatremia which is uncontrolled,We will aim to correct the sodium by 4-6mEq in 24 hours. We will monitor sodium Daily. The hyponatremia is due to ESRD. Discussed with nephrology, dialysate bath adjusted to account for and correct hyponatremia.  Recent Labs   Lab 05/27/24  0503   *     IMPROVING  - Daily morning CMP  - Continue to montior

## 2024-05-27 NOTE — SUBJECTIVE & OBJECTIVE
Interval History: NAEON pending placement    Review of Systems  Objective:     Vital Signs (Most Recent):  Temp: 98.5 °F (36.9 °C) (05/27/24 1130)  Pulse: 99 (05/27/24 1130)  Resp: 18 (05/27/24 1130)  BP: 121/71 (05/27/24 1130)  SpO2: 100 % (05/27/24 1130) Vital Signs (24h Range):  Temp:  [98.3 °F (36.8 °C)-98.9 °F (37.2 °C)] 98.5 °F (36.9 °C)  Pulse:  [] 99  Resp:  [16-19] 18  SpO2:  [97 %-100 %] 100 %  BP: (114-148)/(59-98) 121/71     Weight: 105.5 kg (232 lb 9.4 oz)  Body mass index is 36.43 kg/m².    Intake/Output Summary (Last 24 hours) at 5/27/2024 1203  Last data filed at 5/26/2024 1800  Gross per 24 hour   Intake 660 ml   Output 0 ml   Net 660 ml         Physical Exam  Vitals and nursing note reviewed.   Constitutional:       General: She is not in acute distress.     Appearance: She is not toxic-appearing or diaphoretic.   HENT:      Head: Normocephalic and atraumatic.   Eyes:      General:         Right eye: No discharge.         Left eye: No discharge.      Conjunctiva/sclera: Conjunctivae normal.   Neck:      Comments: Decannulated  Cardiovascular:      Rate and Rhythm: Normal rate and regular rhythm.      Heart sounds: Normal heart sounds.   Pulmonary:      Effort: Pulmonary effort is normal. No respiratory distress.   Abdominal:      General: Abdomen is flat. There is no distension.      Tenderness: There is no abdominal tenderness.      Comments: PEG tube in place with new skin breakdown and retraction   Musculoskeletal:      Right lower leg: No edema.      Left lower leg: No edema.   Skin:     General: Skin is warm and dry.   Neurological:      General: No focal deficit present.      Mental Status: She is alert.      Comments: Nonverbal             Significant Labs: All pertinent labs within the past 24 hours have been reviewed.    Significant Imaging: I have reviewed all pertinent imaging results/findings within the past 24 hours.

## 2024-05-27 NOTE — ASSESSMENT & PLAN NOTE
Adjusting insulin to account for diabetic TF    Patient's FSGs are controlled on current medication regimen.  Last A1c reviewed-   Lab Results   Component Value Date    HGBA1C 10.4 (H) 01/30/2024     Most recent fingerstick glucose reviewed-   Recent Labs   Lab 05/26/24  1236 05/26/24  2154 05/27/24  1044   POCTGLUCOSE 188* 203* 132*       Current correctional scale  Medium  Maintain anti-hyperglycemic dose as follows-   Antihyperglycemics (From admission, onward)      Start     Stop Route Frequency Ordered    05/25/24 2100  insulin glargine U-100 (Lantus) pen 10 Units         -- SubQ Nightly 05/25/24 1035          Hold Oral hypoglycemics while patient is in the hospital.  POCT glucose q6h

## 2024-05-27 NOTE — PROGRESS NOTES
Patient arrived to the acute dialysis unit by bed. Maintenance dialysis started via right IJ tunneled CVC. Isolation precautions maintained. No distress noted. Vitals stable for dialysis.

## 2024-05-27 NOTE — NURSING
Nurses Note -- 4 Eyes      5/27/2024   6:24 AM      Skin assessed during: Q Shift Change      [] No Altered Skin Integrity Present    []Prevention Measures Documented      [x] Yes- Altered Skin Integrity Present or Discovered   [] LDA Added if Not in Epic (Describe Wound)   [] New Altered Skin Integrity was Present on Admit and Documented in LDA   [x] Wound Image Taken    Wound Care Consulted? No    Attending Nurse:  Nan Eastman RN/Staff Member: Hina

## 2024-05-27 NOTE — PROGRESS NOTES
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 History was given from patient's daughter. She was undergoing dialysis today and noticed she was lethargic, less alert than usual self. Pt completed dialysis and still not acting herself. EMS was called, fever of a 100.  On chart review, she did have an episode of large volume emesis around 1700.  Per EMS, she had a slightly elevated temp 100.0°F, glucose 300s.     In the ED: UA 2+ leuks, >100 WBC, many bacteria, WBC 17, CT abd/pelvis concerning for cystitis. Given vanc/zosyn    Overview/Hospital Course:  Patient was admitted to Hospital Medicine service for medical management and evaluation of urosepsis. Patient was continued on vanc/zosyn. Vascular Neurology consulted concerning mental status change with the recommendation to initiate ASA 81 QD and to obtain an MRI to r/o new stroke. Imaging pending. Nephrology was consulted for regularly scheduled dialysis. Afternoon of 4/12, patient was unable to tolerate filtration of volume during dialsis 2/2 hypotension. Patient received 500cc bolus and was transferred back to floor after finishing the session without volume removed. Will monitor for signs of volume overload. Tailoring insulin regimen while uptitrating tube feeds to goal rate. Staph Epi in all 4 bottles; ID with recommendation to continue Vanc & de-escalate meropenem to ertapenem on 04/15 through 4/16. Patient completed IV course of UTI coverage. Patient tolerated volume removal well in dialysis  throughout the rest of her hospital stay. Pending discharge to [facility]ACH pending bed availability. Patient without BM with one episode of vomiting overnight 4/17. KUB with bowel gas and low concern for obstruction with no transition point noted. Escalating bowel regimen. Pending placement as of 4/22. Pulm consult placed to evaluate for possible trach downsize & eventual decannulation to assist placement if safe. Trach capping trial per pulm for 48h and will assess for decannulation on Monday. DVT studies negative. Pulm successfully decannulated pt 4/30, IR exchanged TDC. Pending swallow study with SLP and waiting for dispo options. Pt failed swallow study, placement pending. Working with PT on mobilize pt to sitting in a chair to expand placement options. Pt successfully mobilized using sandee lift but required 2 people to do so. Discussing dispo plan with family, likely d/c home if HD, DME needs able to be met. Pending acceptance at outpatient HD center. Ongoing placement difficulties d/t need for accepting HD facility to have sandee lift with 2 personnel to operate. Family meeting to discuss disposition options planned for Thursday 5/23 at 1 PM. Per family meeting referral sent to [facility], pending placement    Interval History: NAEON pending placement    Review of Systems  Objective:     Vital Signs (Most Recent):  Temp: 98.5 °F (36.9 °C) (05/27/24 1130)  Pulse: 99 (05/27/24 1130)  Resp: 18 (05/27/24 1130)  BP: 121/71 (05/27/24 1130)  SpO2: 100 % (05/27/24 1130) Vital Signs (24h Range):  Temp:  [98.3 °F (36.8 °C)-98.9 °F (37.2 °C)] 98.5 °F (36.9 °C)  Pulse:  [] 99  Resp:  [16-19] 18  SpO2:  [97 %-100 %] 100 %  BP: (114-148)/(59-98) 121/71     Weight: 105.5 kg (232 lb 9.4 oz)  Body mass index is 36.43 kg/m².    Intake/Output Summary (Last 24 hours) at 5/27/2024 1203  Last data filed at 5/26/2024 1800  Gross per 24 hour   Intake 660 ml   Output 0 ml   Net 660 ml         Physical Exam  Vitals and nursing note  reviewed.   Constitutional:       General: She is not in acute distress.     Appearance: She is not toxic-appearing or diaphoretic.   HENT:      Head: Normocephalic and atraumatic.   Eyes:      General:         Right eye: No discharge.         Left eye: No discharge.      Conjunctiva/sclera: Conjunctivae normal.   Neck:      Comments: Decannulated  Cardiovascular:      Rate and Rhythm: Normal rate and regular rhythm.      Heart sounds: Normal heart sounds.   Pulmonary:      Effort: Pulmonary effort is normal. No respiratory distress.   Abdominal:      General: Abdomen is flat. There is no distension.      Tenderness: There is no abdominal tenderness.      Comments: PEG tube in place with new skin breakdown and retraction   Musculoskeletal:      Right lower leg: No edema.      Left lower leg: No edema.   Skin:     General: Skin is warm and dry.   Neurological:      General: No focal deficit present.      Mental Status: She is alert.      Comments: Nonverbal             Significant Labs: All pertinent labs within the past 24 hours have been reviewed.    Significant Imaging: I have reviewed all pertinent imaging results/findings within the past 24 hours.    Assessment/Plan:      * Acute cystitis with hematuria  resolved    Pt presenting with AMS and worsening lethargy. Pt is non-verbal at baseline, does not follow commands, but was less responsive than normal. Pt found to have a fever. Urinary source suspected based off of urine and CT abd/pelvis. Pt has many prior resistant bacterial infections including urinary sources. Has prior with sensitivity to zosyn and has also improved off ED dose of vanc/zosyn. Lactic elevated a 3.8->3.49 prior to completion of fluid bolus 500ml.    Patient with new fever and tachycardia on 4/25. Low threshold to initiate additional infectious workup and repeat UA.     Plan  S/p course of vanc/ertapenem per ID. Course completed 4/16.  Daily cbc  Contact precautions    *on contact precautions  indefinitely while patient still makes urine given pt incontinent per infection control    Debility  Needs:  Wheelchair: patient has a mobility limitation that significantly impairs her ability to participate in one or more mobility related activities of daily living (MRADL's) such as toileting, feeding, dressing, grooming, and bathing in customary locations in the home.  The mobility limitation cannot be sufficiently resolved by the use of a cane or walker.   The use of a manual wheelchair will significantly improve the patient's ability to participate in MRADLS and the patient will use it on regular basis in the home.  Family/pt has expressed her willingness to use a manual wheelchair in the home.  She also has a caregiver who is capable of assisting in mobility.    Mrs Saravia requires a hospital bed due to her requiring positioning of the body in ways not feasible with an ordinary bed to alleviate pain/ is completely immobile /or limited mobility and cannot independently make changes in body position without the use of the bed.  The positioning of the body cannot be sufficiently resolved by the use of pillows and wedges    Patient has a mobility limitation that significantly impairs their ability to participate in one or more mobility related activities of daily living, including toileting. This deficit can be resolved by using a bedside commode. Patient demonstrates mobility limitations that will cause them to be confined to one room at home without bathroom access for up to 30 days. Using a bedside commode will greatly improve the patient's ability to participate in MRADLs       Complication of vascular dialysis catheter  Cath intermittently clogging, not resolving with cath-hedy, going for IR venogram 4/30 with possible exchange. S/p exchange with IR on 4/30      Constipation  RESOLVED with Bowel regimen  Patient without BM x5d. One episode of vomitus night of 4/17. Some concern for bowel obstruction.  Tolerating continuous tube feeds at goal rate with 0cc on residuals. Physical exam with bowel sounds, soft nontender abdomen. KUB without concern for obstruction with bowel gas, lack of transition point.    Patient now with regular bowel movements on bowel regimen.     Plan  - Miralax BID, Senna BID  - compazine PRN    Moderate malnutrition  Nutrition consulted. Most recent weight and BMI monitored-     Measurements:  Wt Readings from Last 1 Encounters:   05/22/24 105.5 kg (232 lb 9.4 oz)   Body mass index is 36.43 kg/m².    Patient has been screened and assessed by RD.    Malnutrition Type:  Context: acute illness or injury  Level: moderate    Malnutrition Characteristic Summary:  Weight Loss (Malnutrition): 10% in 6 months  Subcutaneous Fat (Malnutrition): mild depletion  Muscle Mass (Malnutrition): mild depletion  Fluid Accumulation (Malnutrition): mild    Interventions/Recommendations (treatment strategy):  1.    -- TF  -- going for swallow study with SLP to assess possibility of pleasure oral feedings --> failed repeatedly    Prolonged QT interval  Qtc 526 [04/10/24]      limit Qtc prolonging drugs as able    Spastic hemiplegia of right dominant side as late effect of cerebrovascular disease  Important that patient is able to sit in chair for 4h for dialysis placement needs, even if she requires lift/assistance getting into the chair     This patient has Chronic right hemiplegia due to stroke. Physical therapy services has been scheduled. Continue all standard measures for pressure injury prevention and consult wound care for any wounds (chronic or acute).    ESRD (end stage renal disease) on dialysis  Creatine stable for now. BMP reviewed- noted Estimated Creatinine Clearance: 11.8 mL/min (A) (based on SCr of 6.9 mg/dL (H)). according to latest data. Based on current GFR, CKD stage is end stage.  Monitor UOP and serial BMP and adjust therapy as needed. Renally dose meds. Avoid nephrotoxic medications and  procedures.    -- HD MWF  -- nephro following    Status post tracheostomy  Resolved, decannulated 4/30    Acute encephalopathy  Pt is non-verbal and does not follow commands at baseline. Vascular Neurology consulted given acute change from baseline. MRI with concern for evolution of prior strokes with noted differential of T2 hyperintensities including vasculitis vs demyelination. Discussed with Vascular Neurology; low concern for ongoing vasculitis/demyelination, likely represents typical evolution of prior infarcts.    Patient at baseline as of 4/22.     Plan  RESOLVED  - Repeat head imaging if concern of new change in mental status/focal deficit    Type 2 diabetes mellitus with hyperglycemia, with long-term current use of insulin  Adjusting insulin to account for diabetic TF    Patient's FSGs are controlled on current medication regimen.  Last A1c reviewed-   Lab Results   Component Value Date    HGBA1C 10.4 (H) 01/30/2024     Most recent fingerstick glucose reviewed-   Recent Labs   Lab 05/26/24  1236 05/26/24  2154 05/27/24  1044   POCTGLUCOSE 188* 203* 132*       Current correctional scale  Medium  Maintain anti-hyperglycemic dose as follows-   Antihyperglycemics (From admission, onward)      Start     Stop Route Frequency Ordered    05/25/24 2100  insulin glargine U-100 (Lantus) pen 10 Units         -- SubQ Nightly 05/25/24 1035          Hold Oral hypoglycemics while patient is in the hospital.  POCT glucose q6h    Hyponatremia  Patient has hyponatremia which is uncontrolled,We will aim to correct the sodium by 4-6mEq in 24 hours. We will monitor sodium Daily. The hyponatremia is due to ESRD. Discussed with nephrology, dialysate bath adjusted to account for and correct hyponatremia.  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 Pt underwent extensive course,  currently still healing from this, but no longer has wound vac. Pt's wound currently covered with bandage. Picture in media tabs    Plan  Wound care        VTE Risk Mitigation (From admission, onward)           Ordered     heparin (porcine) injection 1,000 Units  As needed (PRN)         05/21/24 1017     heparin (porcine) injection 3,000 Units  As needed (PRN)         04/17/24 0825     heparin (porcine) injection 7,500 Units  Every 8 hours         04/11/24 0252                    Discharge Planning   ZEKE: 5/31/2024     Code Status: Full Code   Is the patient medically ready for discharge?: No    Reason for patient still in hospital (select all that apply): Patient trending condition  Discharge Plan A: Skilled Nursing Facility                  Osito Dos Santos MD  Department of Hospital Medicine   [facility] - Telemetry Stepdown

## 2024-05-28 PROBLEM — L24.A9 IRRITANT CONTACT DERMATITIS DUE FRICTION OR CONTACT WITH OTHER SPECIFIED BODY FLUIDS: Status: ACTIVE | Noted: 2024-05-28

## 2024-05-28 LAB
POCT GLUCOSE: 171 MG/DL (ref 70–110)
POCT GLUCOSE: 176 MG/DL (ref 70–110)
POCT GLUCOSE: 176 MG/DL (ref 70–110)
POCT GLUCOSE: 202 MG/DL (ref 70–110)

## 2024-05-28 PROCEDURE — 92526 ORAL FUNCTION THERAPY: CPT

## 2024-05-28 PROCEDURE — 63600175 PHARM REV CODE 636 W HCPCS

## 2024-05-28 PROCEDURE — 27000207 HC ISOLATION

## 2024-05-28 PROCEDURE — 25000003 PHARM REV CODE 250

## 2024-05-28 PROCEDURE — 99222 1ST HOSP IP/OBS MODERATE 55: CPT | Mod: ,,, | Performed by: NURSE PRACTITIONER

## 2024-05-28 PROCEDURE — 25000242 PHARM REV CODE 250 ALT 637 W/ HCPCS

## 2024-05-28 PROCEDURE — 92507 TX SP LANG VOICE COMM INDIV: CPT

## 2024-05-28 PROCEDURE — 20600001 HC STEP DOWN PRIVATE ROOM

## 2024-05-28 PROCEDURE — 97530 THERAPEUTIC ACTIVITIES: CPT | Mod: CQ

## 2024-05-28 RX ORDER — SODIUM CHLORIDE 9 MG/ML
INJECTION, SOLUTION INTRAVENOUS ONCE
Status: COMPLETED | OUTPATIENT
Start: 2024-05-28 | End: 2024-05-29

## 2024-05-28 RX ADMIN — METOPROLOL TARTRATE 25 MG: 25 TABLET, FILM COATED ORAL at 09:05

## 2024-05-28 RX ADMIN — INSULIN ASPART 2 UNITS: 100 INJECTION, SOLUTION INTRAVENOUS; SUBCUTANEOUS at 09:05

## 2024-05-28 RX ADMIN — INSULIN GLARGINE 10 UNITS: 100 INJECTION, SOLUTION SUBCUTANEOUS at 09:05

## 2024-05-28 RX ADMIN — HEPARIN SODIUM 7500 UNITS: 5000 INJECTION INTRAVENOUS; SUBCUTANEOUS at 02:05

## 2024-05-28 RX ADMIN — PANTOPRAZOLE SODIUM 40 MG: 40 GRANULE, DELAYED RELEASE ORAL at 09:05

## 2024-05-28 RX ADMIN — HEPARIN SODIUM 7500 UNITS: 5000 INJECTION INTRAVENOUS; SUBCUTANEOUS at 09:05

## 2024-05-28 RX ADMIN — ZINC SULFATE 220 MG (50 MG) CAPSULE 220 MG: CAPSULE at 09:05

## 2024-05-28 RX ADMIN — ASPIRIN 81 MG CHEWABLE TABLET 81 MG: 81 TABLET CHEWABLE at 09:05

## 2024-05-28 RX ADMIN — HEPARIN SODIUM 7500 UNITS: 5000 INJECTION INTRAVENOUS; SUBCUTANEOUS at 06:05

## 2024-05-28 RX ADMIN — Medication 500 MG: at 09:05

## 2024-05-28 RX ADMIN — PSYLLIUM HUSK 1 PACKET: 3.4 POWDER ORAL at 09:05

## 2024-05-28 RX ADMIN — ATORVASTATIN CALCIUM 40 MG: 40 TABLET, FILM COATED ORAL at 09:05

## 2024-05-28 NOTE — CONSULTS
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 History was given from patient's daughter. She was undergoing dialysis today and noticed she was lethargic, less alert than usual self. Pt completed dialysis and still not acting herself. EMS was called, fever of a 100.  On chart review, she did have an episode of large volume emesis around 1700.  Per EMS, she had a slightly elevated temp 100.0°F, glucose 300s.      In the ED: UA 2+ leuks, >100 WBC, many bacteria, WBC 17, CT abd/pelvis concerning for cystitis. Given vanc/zosyn. Patient admitted to hospital medicine service for further management. Skin integrity MARCOS consulted for evaluation of skin injury.    Scheduled Meds:   ascorbic acid (vitamin C)  500 mg Per G Tube BID    aspirin  81 mg Per G Tube Daily    atorvastatin  40 mg Per G Tube Daily    epoetin merry (PROCRIT) injection  50 Units/kg Intravenous Every Mon, Wed, Fri    heparin (porcine)  7,500 Units Subcutaneous Q8H    insulin glargine U-100  10 Units Subcutaneous QHS    metoprolol tartrate  25 mg Per G Tube BID    pantoprazole  40 mg Per G Tube Daily    psyllium husk (aspartame)  1 packet Per G Tube Daily    zinc sulfate  220 mg Per G Tube Daily     Continuous Infusions:   dextrose 10 % in water (D10W)   Intravenous Continuous PRN         PRN Meds:  Current Facility-Administered Medications:     acetaminophen, 650 mg, Per G Tube, Q6H  PRN    dextrose 10 % in water (D10W), , Intravenous, Continuous PRN    dextrose 10%, 12.5 g, Intravenous, PRN    dextrose 10%, 25 g, Intravenous, PRN    glucagon (human recombinant), 1 mg, Intramuscular, PRN    glucose, 16 g, Oral, PRN    glucose, 24 g, Oral, PRN    heparin (porcine), 1,000 Units, Intra-Catheter, PRN    heparin (porcine), 3,000 Units, Intravenous, PRN    hepatitis B virus vacc.rec(PF), 20 mcg, Intramuscular, vaccine x 1 dose    insulin aspart U-100, 0-5 Units, Subcutaneous, QID (AC + HS) PRN    ondansetron, 4 mg, Per G Tube, Q6H PRN    sodium chloride 0.9%, 250 mL, Intravenous, PRN    sodium chloride 0.9%, 10 mL, Intravenous, Q12H PRN    Review of patient's allergies indicates:  No Known Allergies     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    Hypermagnesemia 04/11/2024    POA, Mg 3.2  Daily chem       Morbid obesity     Necrotizing fasciitis      Past Surgical History:   Procedure Laterality Date    CLOSURE OF WOUND Right 2/22/2024    Procedure: CLOSURE, WOUND;  Surgeon: Steve Cole MD;  Location: [facility] OR [address] [place] [address];  Service: General;  Laterality: Right;    ESOPHAGOGASTRODUODENOSCOPY W/ PEG N/A 2/22/2024    Procedure: EGD, WITH PEG TUBE INSERTION;  Surgeon: Steve Cole MD;  Location: [facility] OR [address] [place] [address];  Service: General;  Laterality: N/A;    INCISION AND DRAINAGE OF PERIRECTAL REGION N/A 1/29/2024    Procedure: INCISION AND DRAINAGE, PERIRECTAL REGION;  Surgeon: Axel Ramsay MD;  Location: [facility];  Service: General;  Laterality: N/A;    LUMBAR PUNCTURE N/A 2/16/2024    Procedure: Lumbar Puncture;  Surgeon: Macy Boykin;  Location: [facility];  Service: Anesthesiology;  Laterality: N/A;    PLACEMENT, TRIALYSIS CATH Right 1/31/2024    Procedure: INSERTION, CATHETER, TRIPLE LUMEN, HEMODIALYSIS, TEMPORARY;  Surgeon: Alvin Junior MD;  Location: Health system OR;  Service: General;  Laterality: Right;    REPLACEMENT OF WOUND  VACUUM-ASSISTED CLOSURE DEVICE Right 2/12/2024    Procedure: REPLACEMENT, WOUND VAC;  Surgeon: Mundo Carmona MD;  [facility];  Service: General;  Laterality: Right;    REPLACEMENT OF WOUND VACUUM-ASSISTED CLOSURE DEVICE N/A 2/15/2024    Procedure: REPLACEMENT, WOUND VAC;  Surgeon: Steve Cole MD;  [facility];  Service: General;  Laterality: N/A;    REPLACEMENT OF WOUND VACUUM-ASSISTED CLOSURE DEVICE Right 2/19/2024    Procedure: REPLACEMENT, WOUND VAC;  Surgeon: Steve Cole MD;  [facility];  Service: General;  Laterality: Right;  RLE/groin    REPLACEMENT OF WOUND VACUUM-ASSISTED CLOSURE DEVICE Right 2/22/2024    Procedure: REPLACEMENT, WOUND VAC;  Surgeon: Steve Cole MD;  [facility];  Service: General;  Laterality: Right;    TRACHEOSTOMY N/A 2/22/2024    Procedure: CREATION, TRACHEOSTOMY;  Surgeon: Steve Cole MD;  [facility];  Service: General;  Laterality: N/A;    WOUND DEBRIDEMENT Bilateral 2/2/2024    Procedure: DEBRIDEMENT, WOUND;  Surgeon: Steve Cole MD;  Location: [facility] OR [address] [place] [address];  Service: General;  Laterality: Bilateral;  Bilateral groin  Possible wound vac placement    WOUND DEBRIDEMENT Right 2/6/2024    Procedure: DEBRIDEMENT, WOUND, replace wound vac, possible closure;  Surgeon: Steve Cole MD;  [facility];  Service: General;  Laterality: Right;  RLE    WOUND DEBRIDEMENT Right 2/9/2024    Procedure: DEBRIDEMENT, WOUND w wound vac change;  Surgeon: Steve Cole MD;  [facility];  Service: General;  Laterality: Right;  RLE    WOUND DEBRIDEMENT Right 2/12/2024    Procedure: R thigh wound debridement;  Surgeon: Mundo Carmona MD;  [facility];  Service: General;  Laterality: Right;    WOUND EXPLORATION Right 1/31/2024    Procedure: IRRIGATION & DEBRIDEMENT, WOUND DEBRIDEMENT;  Surgeon: Alvin Junior MD;  Location: [facility];  Service: General;  Laterality:  Right;       Family History    None       Tobacco Use    Smoking status: Unknown    Smokeless tobacco: Not on file   Substance and Sexual Activity    Alcohol use: Not on file    Drug use: Not on file    Sexual activity: Not on file     Review of Systems   Skin:  Positive for wound.       Objective:     Vital Signs (Most Recent):  Temp: 98.5 °F (36.9 °C) (05/28/24 1135)  Pulse: 99 (05/28/24 1135)  Resp: 19 (05/28/24 1135)  BP: 100/68 (05/28/24 1135)  SpO2: 100 % (05/28/24 1135) Vital Signs (24h Range):  Temp:  [97.9 °F (36.6 °C)-99.4 °F (37.4 °C)] 98.5 °F (36.9 °C)  Pulse:  [] 99  Resp:  [17-20] 19  SpO2:  [99 %-100 %] 100 %  BP: (100-141)/(66-84) 100/68     Weight: 105.5 kg (232 lb 9.4 oz)  Body mass index is 36.43 kg/m².     Physical Exam  Constitutional:       Appearance: Normal appearance.   Skin:     General: Skin is warm and dry.      Findings: Lesion present.   Neurological:      Mental Status: She is alert.          Laboratory:  All pertinent labs reviewed within the last 24 hours.    Diagnostic Results:  None      Assessment/Plan:         MARCOS Skin Integrity Evaluation      Skin Integrity MARCOS evaluation of patient as part of the comprehensive skin care team.     She has been admitted for 48 days. Skin injury was noted on 5/24/24. POA no.    (Media file unavailable)    Derm  Irritant contact dermatitis due friction or contact with other specified body fluids  - consult received for evaluation of skin injury.  - pt presenting from [facility] with AMS.   - pink scar tissue visible beneath peg tube insertion site from external bumper friction and moisture.  - area healing but pt remains at risk for injury.  - continue foam dressing to site.  - pt is incontinent of stool. Sacrum and buttocks clear.  - barber surface.  - wedge/heel boots for offloading.  - turn q2h.  - nursing to maintain pressure injury prevention measures and continue wound care per orders.        Thank you for your consult. I will  follow-up with patient. Please contact us if you have any additional questions.      Beth Langley NP  Skin Integrity [facility]  [facility] - Telemetry Stepdown

## 2024-05-28 NOTE — PT/OT/SLP PROGRESS
Physical Therapy Treatment    Patient Name:  Sarah Saravia   MRN:  [id number]    Recommendations:     Discharge Recommendations: Moderate Intensity Therapy  Discharge Equipment Recommendations: lift device, hospital bed, wheelchair  Barriers to discharge:  evolving clinical presentation    Assessment:     Sarah Saravia is a 53 y.o. female admitted with a medical diagnosis of Acute cystitis with hematuria.  She presents with the following impairments/functional limitations: weakness, impaired endurance, impaired functional mobility, impaired self care skills, impaired balance, decreased coordination, decreased upper extremity function, decreased lower extremity function, decreased safety awareness, pain, decreased ROM, impaired skin Pt demonstrated a decrease in posterior lean at the EOB; however, pt still not following commands when asked. Pt requires total/max assistance with sit <> stand transitions, OOB/BTB transfers, and balance to prevent falls due to weakness, instability, pain, fatigue, general deconditioning, decreased attentiveness and fear of falling. Pt continues to show interest and motivation with PT treatment sessions. In light of pt's current condition, it is recommended they participate in moderate intensity therapy program with PT and OT skilled services to gain maximum return of function. Pt can continue to benefit from skilled therapy to increase endurance, tolerance, mobility, ROM and return to PLOF.         Rehab Prognosis: Fair; patient would benefit from acute skilled PT services to address these deficits and reach maximum level of function.    Recent Surgery: * No surgery found *      Plan:     During this hospitalization, patient to be seen 3 x/week to address the identified rehab impairments via therapeutic activities, therapeutic exercises, neuromuscular re-education and progress toward the following goals:    Plan of Care Expires:  06/22/24    Subjective     Chief Complaint: pt  "agreeable to therapy  Patient/Family Comments/goals: Daughter stated, "She was able to pay attention some more yesterday. Is she doing better?"  Pain/Comfort:  Pain Rating 1:  (no rating provided)  Location - Side 1: Right  Location - Orientation 1: generalized  Location 1: knee      Objective:     Communicated with nurse (Pat) prior to session.  Patient found supine, HOB elevated with telemetry, peripheral IV, PEG Tube, no family present upon PT entry to room.     General Precautions: Standard, aphasia, aspiration, fall, NPO, contact (ESBL/MDRO in urine)  Orthopedic Precautions: N/A  Braces: N/A  Respiratory Status: Room air     Functional Mobility:  Bed Mobility:     Rolling Left:  total A x3 via draw sheet, HOB flat. Pt required assistance for hygiene  Rolling Right: total A x3 via draw sheet,. HOB flat. Pt required assistance for hygiene.   Scooting: anteriorly to the EOB Max A via draw sheet, HOB flat; to the HOB dependent x3 via draw sheet  Supine to Sit: max A x2 for trunk elevation via draw sheet and LE clearance   Sit to Supine: total A x2 for trunk control, LE clearance, HOB flat  Transfers:     Sit to Stand:  total A x2 via draw sheet with hand-held assist and B knee blocking; however, pt unable to complete full stand due to pain. Pt required assistance with anterior lean during transition  Balance: static sitting at the EOB SBA x15min. Pt unable to complete reaching tasks when asked.      AM-PAC 6 CLICK MOBILITY  Turning over in bed (including adjusting bedclothes, sheets and blankets)?: 2  Sitting down on and standing up from a chair with arms (e.g., wheelchair, bedside commode, etc.): 2  Moving from lying on back to sitting on the side of the bed?: 2  Moving to and from a bed to a chair (including a wheelchair)?: 2  Need to walk in hospital room?: 1  Climbing 3-5 steps with a railing?: 1  Basic Mobility Total Score: 10       Treatment & Education:  Pt educated on the importance of getting OOB and " spending time sitting on the EOB. Pt was educated on the effects of immobility and moving throughout the day to decrease recovery time and promote healing. Pt was instructed to call for hospital staff assistance when needed and not to get up without assistance.       Patient left supine with all lines intact, call button in reach, nurse notified, and daughter present..    GOALS:   Multidisciplinary Problems       Physical Therapy Goals          Problem: Physical Therapy    Goal Priority Disciplines Outcome Goal Variances Interventions   Physical Therapy Goal     PT, PT/OT Progressing     Description: Goals to be met by: 24   Goals remain appropriate 5/3/2024 to be met by 24  Goals updated 2024 to be met by 24  Goals updated 24 to be met by 24    Patient will increase functional independence with mobility by performin. Supine to sit with Maximum Assistance  2. Sit to supine with Maximum Assistance  3. Rolling to Left and Right with Moderate Assistance.  4. Sitting at edge of bed 5 minutes with Moderate Assistance- MET , monitor consistency  4a. Sitting at edge of bed 10 minutes with Supervision  5. Lower extremity exercise program x20 reps per handout, with assistance as needed  6. Sit to stand transfer with Maximum Assistance with or without LRAD  7. Stand for 2 minutes with Maximum Assistance with or without LRAD                         Time Tracking:     PT Received On: 24  PT Start Time: 1237     PT Stop Time: 1317  PT Total Time (min): 40 min     Billable Minutes: Therapeutic Activity 40    Treatment Type: Treatment  PT/PTA: PTA     Number of PTA visits since last PT visit: 2     2024

## 2024-05-28 NOTE — SUBJECTIVE & OBJECTIVE
Interval History: NAEON pending placement    Review of Systems  Objective:     Vital Signs (Most Recent):  Temp: 97.9 °F (36.6 °C) (05/28/24 0731)  Pulse: 97 (05/28/24 0731)  Resp: 19 (05/28/24 0731)  BP: 113/66 (05/28/24 0731)  SpO2: 99 % (05/28/24 0731) Vital Signs (24h Range):  Temp:  [97.9 °F (36.6 °C)-99.4 °F (37.4 °C)] 97.9 °F (36.6 °C)  Pulse:  [] 97  Resp:  [17-20] 19  SpO2:  [99 %-100 %] 99 %  BP: (113-141)/(66-84) 113/66     Weight: 105.5 kg (232 lb 9.4 oz)  Body mass index is 36.43 kg/m².    Intake/Output Summary (Last 24 hours) at 5/28/2024 1117  Last data filed at 5/28/2024 0800  Gross per 24 hour   Intake 200 ml   Output --   Net 200 ml         Physical Exam  Vitals and nursing note reviewed.   Constitutional:       General: She is not in acute distress.     Appearance: She is not toxic-appearing or diaphoretic.   HENT:      Head: Normocephalic and atraumatic.   Eyes:      General:         Right eye: No discharge.         Left eye: No discharge.      Conjunctiva/sclera: Conjunctivae normal.   Neck:      Comments: Decannulated  Cardiovascular:      Rate and Rhythm: Normal rate and regular rhythm.      Heart sounds: Normal heart sounds.   Pulmonary:      Effort: Pulmonary effort is normal. No respiratory distress.   Abdominal:      General: Abdomen is flat. There is no distension.      Tenderness: There is no abdominal tenderness.      Comments: PEG tube in place with new skin breakdown and retraction   Musculoskeletal:      Right lower leg: No edema.      Left lower leg: No edema.   Skin:     General: Skin is warm and dry.   Neurological:      General: No focal deficit present.      Mental Status: She is alert.      Comments: Nonverbal             Significant Labs: All pertinent labs within the past 24 hours have been reviewed.    Significant Imaging: I have reviewed all pertinent imaging results/findings within the past 24 hours.

## 2024-05-28 NOTE — PROGRESS NOTES
[facility] - Telemetry Stepdown  Nephrology  Progress Note    Patient Name: Sarah Saravia  MRN: [id number]  Admission Date: 4/10/2024  Hospital Length of Stay: 48 days  Attending Provider: Soco Erickson MD   Primary Care Physician: Deanna, Primary Doctor  Principal Problem:Acute cystitis with hematuria    Subjective:     Interval History: HD yesterday, tolerated well. Net UF 1.5L. Pending placement.     Review of patient's allergies indicates:  No Known Allergies  Current Facility-Administered Medications   Medication Frequency    acetaminophen oral solution 650 mg Q6H PRN    ascorbic acid (vitamin C) tablet 500 mg BID    aspirin chewable tablet 81 mg Daily    atorvastatin tablet 40 mg Daily    dextrose 10 % infusion Continuous PRN    dextrose 10% bolus 125 mL 125 mL PRN    dextrose 10% bolus 250 mL 250 mL PRN    epoetin merry injection 6,100 Units Every Mon, Wed, Fri    glucagon (human recombinant) injection 1 mg PRN    glucose chewable tablet 16 g PRN    glucose chewable tablet 24 g PRN    heparin (porcine) injection 1,000 Units PRN    heparin (porcine) injection 3,000 Units PRN    heparin (porcine) injection 7,500 Units Q8H    hepatitis B virus vacc.rec(PF) injection 20 mcg vaccine x 1 dose    insulin aspart U-100 pen 0-5 Units QID (AC + HS) PRN    insulin glargine U-100 (Lantus) pen 10 Units QHS    metoprolol tartrate (LOPRESSOR) tablet 25 mg BID    ondansetron 4 mg/5 mL solution 4 mg Q6H PRN    pantoprazole suspension 40 mg Daily    psyllium husk (aspartame) 3.4 gram packet 1 packet Daily    sodium chloride 0.9% bolus 250 mL 250 mL PRN    sodium chloride 0.9% flush 10 mL Q12H PRN    zinc sulfate capsule 220 mg Daily       Objective:     Vital Signs (Most Recent):  Temp: 98.5 °F (36.9 °C) (05/28/24 1135)  Pulse: 99 (05/28/24 1135)  Resp: 19 (05/28/24 1135)  BP: 100/68 (05/28/24 1135)  SpO2: 100 % (05/28/24 1135) Vital Signs (24h Range):  Temp:  [97.9 °F (36.6 °C)-99.4 °F (37.4 °C)] 98.5 °F (36.9 °C)  Pulse:  []  99  Resp:  [17-20] 19  SpO2:  [99 %-100 %] 100 %  BP: (100-141)/(66-84) 100/68     Weight: 105.5 kg (232 lb 9.4 oz) (05/22/24 1019)  Body mass index is 36.43 kg/m².  Body surface area is 2.23 meters squared.    I/O last 3 completed shifts:  In: 200 [NG/GT:200]  Out: 2150 [Other:2150]     Physical Exam  Vitals and nursing note reviewed.   Constitutional:       General: She is not in acute distress.     Appearance: She is not toxic-appearing or diaphoretic.   HENT:      Head: Normocephalic and atraumatic.   Eyes:      General:         Right eye: No discharge.         Left eye: No discharge.      Conjunctiva/sclera: Conjunctivae normal.   Neck:      Comments: Decannulated  Cardiovascular:      Rate and Rhythm: Normal rate and regular rhythm.      Heart sounds: Normal heart sounds.   Pulmonary:      Effort: Pulmonary effort is normal. No respiratory distress.   Abdominal:      General: Abdomen is flat. There is no distension.      Tenderness: There is no abdominal tenderness.      Comments: PEG tube in place with new skin breakdown and retraction   Musculoskeletal:      Right lower leg: No edema.      Left lower leg: No edema.   Skin:     General: Skin is warm and dry.   Neurological:      General: No focal deficit present.      Mental Status: She is alert.      Comments: Nonverbal          Significant Labs:  CBC:   Recent Labs   Lab 05/27/24  1050   WBC 11.29   RBC 3.58*   HGB 9.3*   HCT 30.0*      MCV 84   MCH 26.0*   MCHC 31.0*     CMP:   Recent Labs   Lab 05/27/24  0503   *   CALCIUM 10.3   ALBUMIN 3.1*   *   K 4.6   CO2 24   CL 87*   BUN 67*   CREATININE 6.9*     All labs within the past 24 hours have been reviewed.         Assessment/Plan:     Renal/  * Acute cystitis with hematuria  - defer to primary     ESRD (end stage renal disease) on dialysis  53 y.o. Black or  Female ESRD-HD M[facility]W-F at [facility] presents to ED on 4/10/2024 with UTI.    Of note, the patient was initiated on RRT  in February 2024 after being admitted with ALVARADO 2/2 iATN due to septic shock. Perm cath placed on 2/29/24. She was transferred to [facility] on 3/12. Deemed ESRD at LT.  Nephrology consulted for inpatient ESRD-HD management    Assessment:   - next dialysis session will be tomorrow   - Continue MWF iHD schedule while IP.   - Hyponatremic in setting of ESRD - consider decreasing FWF   - Continue to monitor intake and output  - Please avoid gadolinium, fleets, phos-based laxatives, NSAIDs  - Dialysis thrice weekly unless more urgent indications arise. Will evaluate RRT requirements Daily.    Anemia of ESRD hgb 9.7 c/w EPO with HD  Recent Labs   Lab 05/27/24  1050   WBC 11.29   HGB 9.3*   HCT 30.0*          Lab Results   Component Value Date    FESATURATED 10 (L) 03/15/2024    FERRITIN 414 (H) 03/15/2024       - Goal in ESRD is Hgb of 10-11. Continue EPO.      Secondary hyperparathyroid of renal origin   - phos/ca controlled off binders for now , phos with next labs      Endocrine  Hyponatremia    Free water restriction   Will continue to adjust with HD         Thank you for your consult. I will follow-up with patient. Please contact us if you have any additional questions.    Delfina Shi, POONAM  Nephrology  [facility] - Telemetry Stepdown

## 2024-05-28 NOTE — PLAN OF CARE
Problem: Infection  Goal: Absence of Infection Signs and Symptoms  Outcome: Progressing     Problem: Skin Injury Risk Increased  Goal: Skin Health and Integrity  Outcome: Progressing     Problem: Adult Inpatient Plan of Care  Goal: Plan of Care Review  Outcome: Progressing  Goal: Patient-Specific Goal (Individualized)  Outcome: Progressing  Goal: Absence of Hospital-Acquired Illness or Injury  Outcome: Progressing  Goal: Optimal Comfort and Wellbeing  Outcome: Progressing  Goal: Readiness for Transition of Care  Outcome: Progressing     Problem: Bariatric Environmental Safety  Goal: Safety Maintained with Care  Outcome: Progressing     Problem: Device-Related Complication Risk (Hemodialysis)  Goal: Safe, Effective Therapy Delivery  Outcome: Progressing     Problem: Hemodynamic Instability (Hemodialysis)  Goal: Effective Tissue Perfusion  Outcome: Progressing     Problem: Infection (Hemodialysis)  Goal: Absence of Infection Signs and Symptoms  Outcome: Progressing     Problem: Diabetes Comorbidity  Goal: Blood Glucose Level Within Targeted Range  Outcome: Progressing     Problem: Adjustment to Illness (Sepsis/Septic Shock)  Goal: Optimal Coping  Outcome: Progressing     Problem: Glycemic Control Impaired (Sepsis/Septic Shock)  Goal: Blood Glucose Level Within Desired Range  Outcome: Progressing     Problem: Infection Progression (Sepsis/Septic Shock)  Goal: Absence of Infection Signs and Symptoms  Outcome: Progressing     Problem: Nutrition Impaired (Sepsis/Septic Shock)  Goal: Optimal Nutrition Intake  Outcome: Progressing     Problem: Fall Injury Risk  Goal: Absence of Fall and Fall-Related Injury  Outcome: Progressing     Problem: Adjustment to Illness (Chronic Kidney Disease)  Goal: Optimal Coping with Chronic Illness  Outcome: Progressing  Goal: Electrolyte Balance  Outcome: Progressing  Goal: Fluid Balance  Outcome: Progressing     Problem: Electrolyte Imbalance (Chronic Kidney Disease)  Goal: Electrolyte  Balance  Outcome: Progressing     Problem: Fluid Volume Excess (Chronic Kidney Disease)  Goal: Fluid Balance  Outcome: Progressing     Problem: Functional Decline (Chronic Kidney Disease)  Goal: Optimal Functional Ability  Outcome: Progressing     Problem: Hematologic Alteration (Chronic Kidney Disease)  Goal: Absence of Anemia Signs and Symptoms  Outcome: Progressing     Problem: Oral Intake Inadequate (Chronic Kidney Disease)  Goal: Optimal Oral Intake  Outcome: Progressing     Problem: Pain (Chronic Kidney Disease)  Goal: Acceptable Pain Control  Outcome: Progressing     Problem: Renal Function Impairment (Chronic Kidney Disease)  Goal: Minimize Renal Failure Effects  Outcome: Progressing     Problem: Restraint, Nonviolent  Goal: Absence of Harm or Injury  Outcome: Progressing     Problem: Wound  Goal: Optimal Coping  Outcome: Progressing  Goal: Optimal Functional Ability  Outcome: Progressing  Goal: Absence of Infection Signs and Symptoms  Outcome: Progressing  Goal: Improved Oral Intake  Outcome: Progressing  Goal: Optimal Pain Control and Function  Outcome: Progressing  Goal: Skin Health and Integrity  Outcome: Progressing  Goal: Optimal Wound Healing  Outcome: Progressing  Patient in bed. No complaints voiced. NADN at this time. Safety measures in place. Call light in reach.

## 2024-05-28 NOTE — HPI
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 History was given from patient's daughter. She was undergoing dialysis today and noticed she was lethargic, less alert than usual self. Pt completed dialysis and still not acting herself. EMS was called, fever of a 100.  On chart review, she did have an episode of large volume emesis around 1700.  Per EMS, she had a slightly elevated temp 100.0°F, glucose 300s.      In the ED: UA 2+ leuks, >100 WBC, many bacteria, WBC 17, CT abd/pelvis concerning for cystitis. Given vanc/zosyn. Patient admitted to hospital medicine service for further management. Skin integrity MARCOS consulted for evaluation of skin injury.

## 2024-05-28 NOTE — PROGRESS NOTES
Please see previous notes from this SW for continuity.    __________________________________________________  KARI received call from [facility] clinic manager Ryder Hermosillo requesting recent nephrology progress note and HD flowsheets. KARI [facility].    SW to follow with updates.    Dafne Mccord, GAYLA  [facility]  [phone]

## 2024-05-28 NOTE — PROGRESS NOTES
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 History was given from patient's daughter. She was undergoing dialysis today and noticed she was lethargic, less alert than usual self. Pt completed dialysis and still not acting herself. EMS was called, fever of a 100.  On chart review, she did have an episode of large volume emesis around 1700.  Per EMS, she had a slightly elevated temp 100.0°F, glucose 300s.     In the ED: UA 2+ leuks, >100 WBC, many bacteria, WBC 17, CT abd/pelvis concerning for cystitis. Given vanc/zosyn    Overview/Hospital Course:  Patient was admitted to Hospital Medicine service for medical management and evaluation of urosepsis. Patient was continued on vanc/zosyn. Vascular Neurology consulted concerning mental status change with the recommendation to initiate ASA 81 QD and to obtain an MRI to r/o new stroke. Imaging pending. Nephrology was consulted for regularly scheduled dialysis. Afternoon of 4/12, patient was unable to tolerate filtration of volume during dialsis 2/2 hypotension. Patient received 500cc bolus and was transferred back to floor after finishing the session without volume removed. Will monitor for signs of volume overload. Tailoring insulin regimen while uptitrating tube feeds to goal rate. Staph Epi in all 4 bottles; ID with recommendation to continue Vanc & de-escalate meropenem to ertapenem on 04/15 through 4/16. Patient completed IV course of UTI coverage. Patient tolerated volume removal well in dialysis  throughout the rest of her hospital stay. Pending discharge to [facility]ACH pending bed availability. Patient without BM with one episode of vomiting overnight 4/17. KUB with bowel gas and low concern for obstruction with no transition point noted. Escalating bowel regimen. Pending placement as of 4/22. Pulm consult placed to evaluate for possible trach downsize & eventual decannulation to assist placement if safe. Trach capping trial per pulm for 48h and will assess for decannulation on Monday. DVT studies negative. Pulm successfully decannulated pt 4/30, IR exchanged TDC. Pending swallow study with SLP and waiting for dispo options. Pt failed swallow study, placement pending. Working with PT on mobilize pt to sitting in a chair to expand placement options. Pt successfully mobilized using sandee lift but required 2 people to do so. Discussing dispo plan with family, likely d/c home if HD, DME needs able to be met. Pending acceptance at outpatient HD center. Ongoing placement difficulties d/t need for accepting HD facility to have sandee lift with 2 personnel to operate. Family meeting to discuss disposition options planned for Thursday 5/23 at 1 PM. Per family meeting referral sent to [facility], pending placement    Interval History: NAEON pending placement    Review of Systems  Objective:     Vital Signs (Most Recent):  Temp: 97.9 °F (36.6 °C) (05/28/24 0731)  Pulse: 97 (05/28/24 0731)  Resp: 19 (05/28/24 0731)  BP: 113/66 (05/28/24 0731)  SpO2: 99 % (05/28/24 0731) Vital Signs (24h Range):  Temp:  [97.9 °F (36.6 °C)-99.4 °F (37.4 °C)] 97.9 °F (36.6 °C)  Pulse:  [] 97  Resp:  [17-20] 19  SpO2:  [99 %-100 %] 99 %  BP: (113-141)/(66-84) 113/66     Weight: 105.5 kg (232 lb 9.4 oz)  Body mass index is 36.43 kg/m².    Intake/Output Summary (Last 24 hours) at 5/28/2024 1117  Last data filed at 5/28/2024 0800  Gross per 24 hour   Intake 200 ml   Output --   Net 200 ml         Physical Exam  Vitals and nursing note  reviewed.   Constitutional:       General: She is not in acute distress.     Appearance: She is not toxic-appearing or diaphoretic.   HENT:      Head: Normocephalic and atraumatic.   Eyes:      General:         Right eye: No discharge.         Left eye: No discharge.      Conjunctiva/sclera: Conjunctivae normal.   Neck:      Comments: Decannulated  Cardiovascular:      Rate and Rhythm: Normal rate and regular rhythm.      Heart sounds: Normal heart sounds.   Pulmonary:      Effort: Pulmonary effort is normal. No respiratory distress.   Abdominal:      General: Abdomen is flat. There is no distension.      Tenderness: There is no abdominal tenderness.      Comments: PEG tube in place with new skin breakdown and retraction   Musculoskeletal:      Right lower leg: No edema.      Left lower leg: No edema.   Skin:     General: Skin is warm and dry.   Neurological:      General: No focal deficit present.      Mental Status: She is alert.      Comments: Nonverbal             Significant Labs: All pertinent labs within the past 24 hours have been reviewed.    Significant Imaging: I have reviewed all pertinent imaging results/findings within the past 24 hours.    Assessment/Plan:      * Acute cystitis with hematuria  resolved    Pt presenting with AMS and worsening lethargy. Pt is non-verbal at baseline, does not follow commands, but was less responsive than normal. Pt found to have a fever. Urinary source suspected based off of urine and CT abd/pelvis. Pt has many prior resistant bacterial infections including urinary sources. Has prior with sensitivity to zosyn and has also improved off ED dose of vanc/zosyn. Lactic elevated a 3.8->3.49 prior to completion of fluid bolus 500ml.    Patient with new fever and tachycardia on 4/25. Low threshold to initiate additional infectious workup and repeat UA.     Plan  S/p course of vanc/ertapenem per ID. Course completed 4/16.  Daily cbc  Contact precautions    *on contact precautions  indefinitely while patient still makes urine given pt incontinent per infection control    Debility  Needs:  Wheelchair: patient has a mobility limitation that significantly impairs her ability to participate in one or more mobility related activities of daily living (MRADL's) such as toileting, feeding, dressing, grooming, and bathing in customary locations in the home.  The mobility limitation cannot be sufficiently resolved by the use of a cane or walker.   The use of a manual wheelchair will significantly improve the patient's ability to participate in MRADLS and the patient will use it on regular basis in the home.  Family/pt has expressed her willingness to use a manual wheelchair in the home.  She also has a caregiver who is capable of assisting in mobility.    Mrs Saravia requires a hospital bed due to her requiring positioning of the body in ways not feasible with an ordinary bed to alleviate pain/ is completely immobile /or limited mobility and cannot independently make changes in body position without the use of the bed.  The positioning of the body cannot be sufficiently resolved by the use of pillows and wedges    Patient has a mobility limitation that significantly impairs their ability to participate in one or more mobility related activities of daily living, including toileting. This deficit can be resolved by using a bedside commode. Patient demonstrates mobility limitations that will cause them to be confined to one room at home without bathroom access for up to 30 days. Using a bedside commode will greatly improve the patient's ability to participate in MRADLs       Complication of vascular dialysis catheter  Cath intermittently clogging, not resolving with cath-hedy, going for IR venogram 4/30 with possible exchange. S/p exchange with IR on 4/30      Constipation  RESOLVED with Bowel regimen  Patient without BM x5d. One episode of vomitus night of 4/17. Some concern for bowel obstruction.  Tolerating continuous tube feeds at goal rate with 0cc on residuals. Physical exam with bowel sounds, soft nontender abdomen. KUB without concern for obstruction with bowel gas, lack of transition point.    Patient now with regular bowel movements on bowel regimen.     Plan  - Miralax BID, Senna BID  - compazine PRN    Moderate malnutrition  Nutrition consulted. Most recent weight and BMI monitored-     Measurements:  Wt Readings from Last 1 Encounters:   05/22/24 105.5 kg (232 lb 9.4 oz)   Body mass index is 36.43 kg/m².    Patient has been screened and assessed by RD.    Malnutrition Type:  Context: acute illness or injury  Level: moderate    Malnutrition Characteristic Summary:  Weight Loss (Malnutrition): 10% in 6 months  Subcutaneous Fat (Malnutrition): mild depletion  Muscle Mass (Malnutrition): mild depletion  Fluid Accumulation (Malnutrition): mild    Interventions/Recommendations (treatment strategy):  1.    -- TF  -- going for swallow study with SLP to assess possibility of pleasure oral feedings --> failed repeatedly    Prolonged QT interval  Qtc 526 [04/10/24]      limit Qtc prolonging drugs as able    Spastic hemiplegia of right dominant side as late effect of cerebrovascular disease  Important that patient is able to sit in chair for 4h for dialysis placement needs, even if she requires lift/assistance getting into the chair     This patient has Chronic right hemiplegia due to stroke. Physical therapy services has been scheduled. Continue all standard measures for pressure injury prevention and consult wound care for any wounds (chronic or acute).    ESRD (end stage renal disease) on dialysis  Creatine stable for now. BMP reviewed- noted Estimated Creatinine Clearance: 11.8 mL/min (A) (based on SCr of 6.9 mg/dL (H)). according to latest data. Based on current GFR, CKD stage is end stage.  Monitor UOP and serial BMP and adjust therapy as needed. Renally dose meds. Avoid nephrotoxic medications and  "procedures.    -- HD MWF  -- nephro following    Status post tracheostomy  Resolved, decannulated 4/30    Acute encephalopathy  Pt is non-verbal and does not follow commands at baseline. Vascular Neurology consulted given acute change from baseline. MRI with concern for evolution of prior strokes with noted differential of T2 hyperintensities including vasculitis vs demyelination. Discussed with Vascular Neurology; low concern for ongoing vasculitis/demyelination, likely represents typical evolution of prior infarcts.    Patient at baseline as of 4/22.     Plan  RESOLVED  - Repeat head imaging if concern of new change in mental status/focal deficit    Type 2 diabetes mellitus with hyperglycemia, with long-term current use of insulin  Adjusting insulin to account for diabetic TF    Patient's FSGs are controlled on current medication regimen.  Last A1c reviewed-   Lab Results   Component Value Date    HGBA1C 6.2 (H) 05/27/2024     Most recent fingerstick glucose reviewed-   Recent Labs   Lab 05/27/24  1248 05/27/24  1536 05/27/24  2053 05/28/24  0730   POCTGLUCOSE 126* 152* 165* 202*       Current correctional scale  Medium  Maintain anti-hyperglycemic dose as follows-   Antihyperglycemics (From admission, onward)      Start     Stop Route Frequency Ordered    05/27/24 1633  insulin aspart U-100 pen 0-5 Units         -- SubQ Before meals & nightly PRN 05/27/24 1533    05/25/24 2100  insulin glargine U-100 (Lantus) pen 10 Units         -- SubQ Nightly 05/25/24 1035          Hold Oral hypoglycemics while patient is in the hospital.  POCT glucose q6h    Hyponatremia  Patient has hyponatremia which is uncontrolled,We will aim to correct the sodium by 4-6mEq in 24 hours. We will monitor sodium Daily. The hyponatremia is due to ESRD. Discussed with nephrology, dialysate bath adjusted to account for and correct hyponatremia.  No results for input(s): "NA" in the last 24 hours.  IMPROVING  - Daily morning CMP  - Continue to " [facility]    Ros's gangrene  Pt experienced severe episode of ros's gangrene in January of 2024. Pt underwent extensive course, currently still healing from this, but no longer has wound vac. Pt's wound currently covered with bandage. Picture in media tabs    Plan  Wound care        VTE Risk Mitigation (From admission, onward)           Ordered     heparin (porcine) injection 1,000 Units  As needed (PRN)         05/21/24 1017     heparin (porcine) injection 3,000 Units  As needed (PRN)         04/17/24 0825     heparin (porcine) injection 7,500 Units  Every 8 hours         04/11/24 0252                    Discharge Planning   ZEKE: 5/31/2024     Code Status: Full Code   Is the patient medically ready for discharge?: No    Reason for patient still in hospital (select all that apply): Patient trending condition  Discharge Plan A: Skilled Nursing Facility                  Osito Dos Santos MD  Department of Hospital Medicine   [facility] - Telemetry Stepdown

## 2024-05-28 NOTE — PLAN OF CARE
Call placed to [facility] ([phone]) to check on the status of patient's SNF referral. This CM was informed that they do not accept [facility] and patient would need to be admitted as a senior living placement. Dr. Erickson notified of above. CM to discuss senior living placement with patient's family. Will continue to follow.    14:15PM  CM met with patient's daughter Aleks and informed her of above conversation. This CM explained that we would need to pursue senior living placement. Aleks then inquired why facility did not accept [facility] and this CM stated that facilities do not accept all insurances. She then asked if the MD could please come again to discuss the possibility of home dialysis. Aleks also inquired if it would be possible for her to go to the dialysis facility with her mother and help assist someone with getting her mother into the [facility]ee lift for dialysis.     Sara Loredo RN  Ext [phone]

## 2024-05-28 NOTE — SUBJECTIVE & OBJECTIVE
Scheduled Meds:   ascorbic acid (vitamin C)  500 mg Per G Tube BID    aspirin  81 mg Per G Tube Daily    atorvastatin  40 mg Per G Tube Daily    epoetin merry (PROCRIT) injection  50 Units/kg Intravenous Every Mon, Wed, Fri    heparin (porcine)  7,500 Units Subcutaneous Q8H    insulin glargine U-100  10 Units Subcutaneous QHS    metoprolol tartrate  25 mg Per G Tube BID    pantoprazole  40 mg Per G Tube Daily    psyllium husk (aspartame)  1 packet Per G Tube Daily    zinc sulfate  220 mg Per G Tube Daily     Continuous Infusions:   dextrose 10 % in water (D10W)   Intravenous Continuous PRN         PRN Meds:  Current Facility-Administered Medications:     acetaminophen, 650 mg, Per G Tube, Q6H PRN    dextrose 10 % in water (D10W), , Intravenous, Continuous PRN    dextrose 10%, 12.5 g, Intravenous, PRN    dextrose 10%, 25 g, Intravenous, PRN    glucagon (human recombinant), 1 mg, Intramuscular, PRN    glucose, 16 g, Oral, PRN    glucose, 24 g, Oral, PRN    heparin (porcine), 1,000 Units, Intra-Catheter, PRN    heparin (porcine), 3,000 Units, Intravenous, PRN    hepatitis B virus vacc.rec(PF), 20 mcg, Intramuscular, vaccine x 1 dose    insulin aspart U-100, 0-5 Units, Subcutaneous, QID (AC + HS) PRN    ondansetron, 4 mg, Per G Tube, Q6H PRN    sodium chloride 0.9%, 250 mL, Intravenous, PRN    sodium chloride 0.9%, 10 mL, Intravenous, Q12H PRN    Review of patient's allergies indicates:  No Known Allergies     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    Hypermagnesemia 04/11/2024    POA, Mg 3.2  Daily chem       Morbid obesity     Necrotizing fasciitis      Past Surgical History:   Procedure Laterality Date    CLOSURE OF WOUND Right 2/22/2024    Procedure: CLOSURE, WOUND;  Surgeon: Steve Cole MD;  [place] [facility] [place] [address] [place] [address];  Service: General;  Laterality: Right;    ESOPHAGOGASTRODUODENOSCOPY W/ PEG N/A 2/22/2024    Procedure: EGD, WITH PEG TUBE INSERTION;   Surgeon: Steve Cole MD;  Location: [facility] OR 2ND FLR;  Service: General;  Laterality: N/A;    INCISION AND DRAINAGE OF PERIRECTAL REGION N/A 1/29/2024    Procedure: INCISION AND DRAINAGE, PERIRECTAL REGION;  Surgeon: Axel Ramsay MD;  Location: [facility] OR;  Service: General;  Laterality: N/A;    LUMBAR PUNCTURE N/A 2/16/2024    Procedure: Lumbar Puncture;  Surgeon: Macy Boykin;  Location: [facility];  Service: Anesthesiology;  Laterality: N/A;    PLACEMENT, TRIALYSIS CATH Right 1/31/2024    Procedure: INSERTION, CATHETER, TRIPLE LUMEN, HEMODIALYSIS, TEMPORARY;  Surgeon: Alvin Junior MD;  Location: [facility] OR;  Service: General;  Laterality: Right;    REPLACEMENT OF WOUND VACUUM-ASSISTED CLOSURE DEVICE Right 2/12/2024    Procedure: REPLACEMENT, WOUND VAC;  Surgeon: Mundo Carmona MD;  Location: [facility] OR 2ND FLR;  Service: General;  Laterality: Right;    REPLACEMENT OF WOUND VACUUM-ASSISTED CLOSURE DEVICE N/A 2/15/2024    Procedure: REPLACEMENT, WOUND VAC;  Surgeon: Steve Cole MD;  Location: [facility] OR 2ND FLR;  Service: General;  Laterality: N/A;    REPLACEMENT OF WOUND VACUUM-ASSISTED CLOSURE DEVICE Right 2/19/2024    Procedure: REPLACEMENT, WOUND VAC;  Surgeon: Steve Cole MD;  Location: [facility] OR 2ND FLR;  Service: General;  Laterality: Right;  RLE/groin    REPLACEMENT OF WOUND VACUUM-ASSISTED CLOSURE DEVICE Right 2/22/2024    Procedure: REPLACEMENT, WOUND VAC;  Surgeon: Steve Cole MD;  Location: [facility] OR 2ND FLR;  Service: General;  Laterality: Right;    TRACHEOSTOMY N/A 2/22/2024    Procedure: CREATION, TRACHEOSTOMY;  Surgeon: Steve Cole MD;  Location: [facility] OR 2ND FLR;  Service: General;  Laterality: N/A;    WOUND DEBRIDEMENT Bilateral 2/2/2024    Procedure: DEBRIDEMENT, WOUND;  Surgeon: Steve Cole MD;  Location: [facility] OR 2ND FLR;  Service: General;  Laterality: Bilateral;  Bilateral groin  Possible wound vac placement    WOUND DEBRIDEMENT Right 2/6/2024    Procedure: DEBRIDEMENT, WOUND,  replace wound vac, possible closure;  Surgeon: Steve Cole MD;  Location: [facility] OR 2ND FLR;  Service: General;  Laterality: Right;  RLE    WOUND DEBRIDEMENT Right 2/9/2024    Procedure: DEBRIDEMENT, WOUND w wound vac change;  Surgeon: Steve Cole MD;  Location: [facility] OR 2ND FLR;  Service: General;  Laterality: Right;  RLE    WOUND DEBRIDEMENT Right 2/12/2024    Procedure: R thigh wound debridement;  Surgeon: Mundo Carmona MD;  Location: [facility] OR 2ND FLR;  Service: General;  Laterality: Right;    WOUND EXPLORATION Right 1/31/2024    Procedure: IRRIGATION & DEBRIDEMENT, WOUND DEBRIDEMENT;  Surgeon: Alvin Junior MD;  Location: [facility] OR;  Service: General;  Laterality: Right;       Family History    None       Tobacco Use    Smoking status: Unknown    Smokeless tobacco: Not on file   Substance and Sexual Activity    Alcohol use: Not on file    Drug use: Not on file    Sexual activity: Not on file     Review of Systems   Skin:  Positive for wound.       Objective:     Vital Signs (Most Recent):  Temp: 98.5 °F (36.9 °C) (05/28/24 1135)  Pulse: 99 (05/28/24 1135)  Resp: 19 (05/28/24 1135)  BP: 100/68 (05/28/24 1135)  SpO2: 100 % (05/28/24 1135) Vital Signs (24h Range):  Temp:  [97.9 °F (36.6 °C)-99.4 °F (37.4 °C)] 98.5 °F (36.9 °C)  Pulse:  [] 99  Resp:  [17-20] 19  SpO2:  [99 %-100 %] 100 %  BP: (100-141)/(66-84) 100/68     Weight: 105.5 kg (232 lb 9.4 oz)  Body mass index is 36.43 kg/m².     Physical Exam  Constitutional:       Appearance: Normal appearance.   Skin:     General: Skin is warm and dry.      Findings: Lesion present.   Neurological:      Mental Status: She is alert.          Laboratory:  All pertinent labs reviewed within the last 24 hours.    Diagnostic Results:  None

## 2024-05-28 NOTE — PT/OT/SLP PROGRESS
"Speech Language Pathology Treatment    Patient Name:  Sarah Saravia   MRN:  [id number]  Admitting Diagnosis: Acute cystitis with hematuria    Recommendations:                 General Recommendations:  Dysphagia therapy and Speech/language therapy  Diet recommendations:  NPO, Liquid Diet Level: NPO  Pt may receive thin water for comfort/pleasure.  Aspiration Precautions: Continue alternate means of nutrition, HOB to 90 degrees, Monitor for s/s of aspiration, and Strict aspiration precautions   General Precautions: Standard, aphasia, aspiration, fall, NPO  Communication strategies:  yes/no questions only and go to room if call light pushed      Assessment:     Sarah Saravia is a 53 y.o. female with an SLP diagnosis of Aphasia, Dysphagia, and Cognitive-Linguistic Impairment.      Subjective     Pt remained nonverbal, though she did vocalize on occasion. Daughter present.     Pain/Comfort:       Respiratory Status: Room air    Objective:     Has the patient been evaluated by SLP for swallowing?   Yes  Keep patient NPO? Yes   Current Respiratory Status:        Pt seen for ongoing swallowing assessment/dysphagia therapy and aphasia tx. Pt sitting upright for PO presentations.  Pt accepted a straw sip of orange juice, but orally expelled bolus into cup.  Pt accepted straw sips of water without oral expulsion and without prolonged holding.  Pt accepted straw sips of apple juice x 5, but exhibited prolonged holding for 1 trial, requiring an additional straw sip of water to facilitate initiation of oral transit and pharyngeal swallow.  Throat clear present x 1 when pt was instructed to phonate "ah" to assess vocal quality after trials.  Pt did not consistently vocalize upon command.  SLP recommends pt continue to only receive thin water for comfort at this time due to continued oral holding. Pt with pen and notebook at bedside. Pt noted to have written her full name legibly. Pt did not write words to dictation or upon " "commands, but did perseverate on writing her name. SLP attempted to elicit a spontaneous written response to tell SLP how the apple juice tasted.  SLP wrote the word "good" and instructed pt to copy. Pt eventually did copy the word "good" but also perseverated on writing her middle and last name.  Cont POC.     Goals:   Multidisciplinary Problems       SLP Goals          Problem: SLP    Goal Priority Disciplines Outcome   SLP Goal     SLP    Description: Speech Language Pathology Goals  Updated goals expected to be met by 5/10 (goals remain appropriate 5/27 - reassess on 6/3:  1. Pt will participate in ongoing swallowing assessment to determine if appropriate for PO trials for pleasure.   2. Pt will model single step command x 1 given max cues.   3. Pt will answer simple yes/no q's during a structured receptive language task x 1 given max cues.   4. Pt will phonate purposefully upon command x 1 given max cues.   5. Pt will attempt to vocalize/verbalize during automatic speech task x 1 given max cues.   6. Pt will participate in evaluation of ability to utilize basic communication board.     Goals expected to be met by 5/8:  1. Pt will participate in Modified Barium Swallow Study to determine if safe for oral intake. Goal met/attempted 5/2                               Plan:     Patient to be seen:  3 x/week   Plan of Care expires:  05/31/24  Plan of Care reviewed with:  patient, daughter   SLP Follow-Up:  Yes       Discharge recommendations:  Moderate Intensity Therapy     Time Tracking:     SLP Treatment Date:   05/28/24  Speech Start Time:  1323  Speech Stop Time:  1343     Speech Total Time (min):  20 min    Billable Minutes: Speech Therapy Individual 10 and Treatment Swallowing Dysfunction 10    05/28/2024    "

## 2024-05-28 NOTE — ASSESSMENT & PLAN NOTE
Adjusting insulin to account for diabetic TF    Patient's FSGs are controlled on current medication regimen.  Last A1c reviewed-   Lab Results   Component Value Date    HGBA1C 6.2 (H) 05/27/2024     Most recent fingerstick glucose reviewed-   Recent Labs   Lab 05/27/24  1248 05/27/24  1536 05/27/24  2053 05/28/24  0730   POCTGLUCOSE 126* 152* 165* 202*       Current correctional scale  Medium  Maintain anti-hyperglycemic dose as follows-   Antihyperglycemics (From admission, onward)    Start     Stop Route Frequency Ordered    05/27/24 1633  insulin aspart U-100 pen 0-5 Units         -- SubQ Before meals & nightly PRN 05/27/24 1533    05/25/24 2100  insulin glargine U-100 (Lantus) pen 10 Units         -- SubQ Nightly 05/25/24 1035        Hold Oral hypoglycemics while patient is in the hospital.  POCT glucose q6h

## 2024-05-28 NOTE — PLAN OF CARE
Problem: Physical Therapy  Goal: Physical Therapy Goal  Description: Goals to be met by: 24   Goals remain appropriate 5/3/2024 to be met by 24  Goals updated 2024 to be met by 24  Goals updated 24 to be met by 24    Patient will increase functional independence with mobility by performin. Supine to sit with Maximum Assistance  2. Sit to supine with Maximum Assistance  3. Rolling to Left and Right with Moderate Assistance.  4. Sitting at edge of bed 5 minutes with Moderate Assistance- MET , monitor consistency  4a. Sitting at edge of bed 10 minutes with Supervision  5. Lower extremity exercise program x20 reps per handout, with assistance as needed  6. Sit to stand transfer with Maximum Assistance with or without LRAD  7. Stand for 2 minutes with Maximum Assistance with or without LRAD    Outcome: Progressing    POC & Goals extended  Goal 4 MET  Progression denoted via Goal 4a

## 2024-05-28 NOTE — ASSESSMENT & PLAN NOTE
- consult received for evaluation of skin injury.  - pt presenting from [facility] extended with AMS.   - pink scar tissue visible beneath peg tube insertion site from external bumper friction and moisture.  - area healing but pt remains at risk for injury.  - continue foam dressing to site.  - pt is incontinent of stool. Sacrum and buttocks clear.  - barber surface.  - wedge/heel boots for offloading.  - turn q2h.  - nursing to maintain pressure injury prevention measures and continue wound care per orders.

## 2024-05-28 NOTE — ASSESSMENT & PLAN NOTE
53 y.o. Black or  Female ESRD-HD M-W-F at [facility] presents to ED on 4/10/2024 with UTI.    Of note, the patient was initiated on RRT in February 2024 after being admitted with ALVARADO 2/2 iATN due to septic shock. Perm cath placed on 2/29/24. She was transferred to [facility] on 3/12. Deemed ESRD at [facility].  Nephrology consulted for inpatient ESRD-HD management    Assessment:   - next dialysis session will be tomorrow   - Continue MWF iHD schedule while IP.   - Hyponatremic in setting of ESRD - consider decreasing FWF   - Continue to monitor intake and output  - Please avoid gadolinium, fleets, phos-based laxatives, NSAIDs  - Dialysis thrice weekly unless more urgent indications arise. Will evaluate RRT requirements Daily.    Anemia of ESRD hgb 9.7 c/w EPO with HD  Recent Labs   Lab 05/27/24  1050   WBC 11.29   HGB 9.3*   HCT 30.0*          Lab Results   Component Value Date    FESATURATED 10 (L) 03/15/2024    FERRITIN 414 (H) 03/15/2024       - Goal in ESRD is Hgb of 10-11. Continue EPO.      Secondary hyperparathyroid of renal origin   - phos/ca controlled off binders for now , phos with next labs

## 2024-05-28 NOTE — SUBJECTIVE & OBJECTIVE
Interval History: HD yesterday, tolerated well. Net UF 1.5L. Pending placement.     Review of patient's allergies indicates:  No Known Allergies  Current Facility-Administered Medications   Medication Frequency    acetaminophen oral solution 650 mg Q6H PRN    ascorbic acid (vitamin C) tablet 500 mg BID    aspirin chewable tablet 81 mg Daily    atorvastatin tablet 40 mg Daily    dextrose 10 % infusion Continuous PRN    dextrose 10% bolus 125 mL 125 mL PRN    dextrose 10% bolus 250 mL 250 mL PRN    epoetin merry injection 6,100 Units Every Mon, Wed, Fri    glucagon (human recombinant) injection 1 mg PRN    glucose chewable tablet 16 g PRN    glucose chewable tablet 24 g PRN    heparin (porcine) injection 1,000 Units PRN    heparin (porcine) injection 3,000 Units PRN    heparin (porcine) injection 7,500 Units Q8H    hepatitis B virus vacc.rec(PF) injection 20 mcg vaccine x 1 dose    insulin aspart U-100 pen 0-5 Units QID (AC + HS) PRN    insulin glargine U-100 (Lantus) pen 10 Units QHS    metoprolol tartrate (LOPRESSOR) tablet 25 mg BID    ondansetron 4 mg/5 mL solution 4 mg Q6H PRN    pantoprazole suspension 40 mg Daily    psyllium husk (aspartame) 3.4 gram packet 1 packet Daily    sodium chloride 0.9% bolus 250 mL 250 mL PRN    sodium chloride 0.9% flush 10 mL Q12H PRN    zinc sulfate capsule 220 mg Daily       Objective:     Vital Signs (Most Recent):  Temp: 98.5 °F (36.9 °C) (05/28/24 1135)  Pulse: 99 (05/28/24 1135)  Resp: 19 (05/28/24 1135)  BP: 100/68 (05/28/24 1135)  SpO2: 100 % (05/28/24 1135) Vital Signs (24h Range):  Temp:  [97.9 °F (36.6 °C)-99.4 °F (37.4 °C)] 98.5 °F (36.9 °C)  Pulse:  [] 99  Resp:  [17-20] 19  SpO2:  [99 %-100 %] 100 %  BP: (100-141)/(66-84) 100/68     Weight: 105.5 kg (232 lb 9.4 oz) (05/22/24 1019)  Body mass index is 36.43 kg/m².  Body surface area is 2.23 meters squared.    I/O last 3 completed shifts:  In: 200 [NG/GT:200]  Out: 2150 [Other:2150]     Physical Exam  Vitals and  nursing note reviewed.   Constitutional:       General: She is not in acute distress.     Appearance: She is not toxic-appearing or diaphoretic.   HENT:      Head: Normocephalic and atraumatic.   Eyes:      General:         Right eye: No discharge.         Left eye: No discharge.      Conjunctiva/sclera: Conjunctivae normal.   Neck:      Comments: Decannulated  Cardiovascular:      Rate and Rhythm: Normal rate and regular rhythm.      Heart sounds: Normal heart sounds.   Pulmonary:      Effort: Pulmonary effort is normal. No respiratory distress.   Abdominal:      General: Abdomen is flat. There is no distension.      Tenderness: There is no abdominal tenderness.      Comments: PEG tube in place with new skin breakdown and retraction   Musculoskeletal:      Right lower leg: No edema.      Left lower leg: No edema.   Skin:     General: Skin is warm and dry.   Neurological:      General: No focal deficit present.      Mental Status: She is alert.      Comments: Nonverbal          Significant Labs:  CBC:   Recent Labs   Lab 05/27/24  1050   WBC 11.29   RBC 3.58*   HGB 9.3*   HCT 30.0*      MCV 84   MCH 26.0*   MCHC 31.0*     CMP:   Recent Labs   Lab 05/27/24  0503   *   CALCIUM 10.3   ALBUMIN 3.1*   *   K 4.6   CO2 24   CL 87*   BUN 67*   CREATININE 6.9*     All labs within the past 24 hours have been reviewed.

## 2024-05-29 PROBLEM — B37.31 VULVOVAGINAL CANDIDIASIS: Status: ACTIVE | Noted: 2024-05-29

## 2024-05-29 LAB
ALBUMIN SERPL BCP-MCNC: 3.1 G/DL (ref 3.5–5.2)
ANION GAP SERPL CALC-SCNC: 13 MMOL/L (ref 8–16)
BUN SERPL-MCNC: 12 MG/DL (ref 6–20)
BUN SERPL-MCNC: 46 MG/DL (ref 6–20)
BUN SERPL-MCNC: 47 MG/DL (ref 6–20)
CALCIUM SERPL-MCNC: 9.9 MG/DL (ref 8.7–10.5)
CHLORIDE SERPL-SCNC: 92 MMOL/L (ref 95–110)
CO2 SERPL-SCNC: 24 MMOL/L (ref 23–29)
CREAT SERPL-MCNC: 5.4 MG/DL (ref 0.5–1.4)
EST. GFR  (NO RACE VARIABLE): 8.9 ML/MIN/1.73 M^2
GLUCOSE SERPL-MCNC: 153 MG/DL (ref 70–110)
PHOSPHATE SERPL-MCNC: 4.7 MG/DL (ref 2.7–4.5)
POCT GLUCOSE: 112 MG/DL (ref 70–110)
POCT GLUCOSE: 164 MG/DL (ref 70–110)
POCT GLUCOSE: 184 MG/DL (ref 70–110)
POCT GLUCOSE: 208 MG/DL (ref 70–110)
POTASSIUM SERPL-SCNC: 4.3 MMOL/L (ref 3.5–5.1)
SODIUM SERPL-SCNC: 129 MMOL/L (ref 136–145)

## 2024-05-29 PROCEDURE — 63600175 PHARM REV CODE 636 W HCPCS: Performed by: INTERNAL MEDICINE

## 2024-05-29 PROCEDURE — 84520 ASSAY OF UREA NITROGEN: CPT | Performed by: NURSE PRACTITIONER

## 2024-05-29 PROCEDURE — 30200315 PPD INTRADERMAL TEST REV CODE 302: Performed by: STUDENT IN AN ORGANIZED HEALTH CARE EDUCATION/TRAINING PROGRAM

## 2024-05-29 PROCEDURE — 36415 COLL VENOUS BLD VENIPUNCTURE: CPT | Performed by: STUDENT IN AN ORGANIZED HEALTH CARE EDUCATION/TRAINING PROGRAM

## 2024-05-29 PROCEDURE — 63600175 PHARM REV CODE 636 W HCPCS

## 2024-05-29 PROCEDURE — 90935 HEMODIALYSIS ONE EVALUATION: CPT | Mod: ,,, | Performed by: NURSE PRACTITIONER

## 2024-05-29 PROCEDURE — 80100014 HC HEMODIALYSIS 1:1

## 2024-05-29 PROCEDURE — 25000003 PHARM REV CODE 250

## 2024-05-29 PROCEDURE — G0257 UNSCHED DIALYSIS ESRD PT HOS: HCPCS

## 2024-05-29 PROCEDURE — 20600001 HC STEP DOWN PRIVATE ROOM

## 2024-05-29 PROCEDURE — 36415 COLL VENOUS BLD VENIPUNCTURE: CPT

## 2024-05-29 PROCEDURE — 25000242 PHARM REV CODE 250 ALT 637 W/ HCPCS

## 2024-05-29 PROCEDURE — 63600175 PHARM REV CODE 636 W HCPCS: Performed by: NURSE PRACTITIONER

## 2024-05-29 PROCEDURE — 97530 THERAPEUTIC ACTIVITIES: CPT | Mod: CQ

## 2024-05-29 PROCEDURE — 86580 TB INTRADERMAL TEST: CPT | Performed by: STUDENT IN AN ORGANIZED HEALTH CARE EDUCATION/TRAINING PROGRAM

## 2024-05-29 PROCEDURE — 80069 RENAL FUNCTION PANEL: CPT

## 2024-05-29 PROCEDURE — 25000003 PHARM REV CODE 250: Performed by: NURSE PRACTITIONER

## 2024-05-29 PROCEDURE — 27000207 HC ISOLATION

## 2024-05-29 PROCEDURE — 84520 ASSAY OF UREA NITROGEN: CPT | Mod: 91 | Performed by: STUDENT IN AN ORGANIZED HEALTH CARE EDUCATION/TRAINING PROGRAM

## 2024-05-29 RX ORDER — FLUCONAZOLE 150 MG/1
150 TABLET ORAL ONCE
Status: COMPLETED | OUTPATIENT
Start: 2024-05-29 | End: 2024-05-29

## 2024-05-29 RX ORDER — HEPARIN SODIUM 5000 [USP'U]/ML
5000 INJECTION, SOLUTION INTRAVENOUS; SUBCUTANEOUS EVERY 8 HOURS
Status: DISCONTINUED | OUTPATIENT
Start: 2024-05-29 | End: 2024-06-26

## 2024-05-29 RX ORDER — SEVELAMER CARBONATE FOR ORAL SUSPENSION 800 MG/1
0.8 POWDER, FOR SUSPENSION ORAL
Status: DISCONTINUED | OUTPATIENT
Start: 2024-05-29 | End: 2024-05-31

## 2024-05-29 RX ADMIN — SEVELAMER CARBONATE 0.8 G: 800 POWDER, FOR SUSPENSION ORAL at 04:05

## 2024-05-29 RX ADMIN — INSULIN GLARGINE 10 UNITS: 100 INJECTION, SOLUTION SUBCUTANEOUS at 09:05

## 2024-05-29 RX ADMIN — Medication 500 MG: at 09:05

## 2024-05-29 RX ADMIN — HEPARIN SODIUM 1000 UNITS: 1000 INJECTION, SOLUTION INTRAVENOUS; SUBCUTANEOUS at 11:05

## 2024-05-29 RX ADMIN — ASPIRIN 81 MG CHEWABLE TABLET 81 MG: 81 TABLET CHEWABLE at 12:05

## 2024-05-29 RX ADMIN — INSULIN ASPART 2 UNITS: 100 INJECTION, SOLUTION INTRAVENOUS; SUBCUTANEOUS at 04:05

## 2024-05-29 RX ADMIN — Medication 500 MG: at 12:05

## 2024-05-29 RX ADMIN — ATORVASTATIN CALCIUM 40 MG: 40 TABLET, FILM COATED ORAL at 12:05

## 2024-05-29 RX ADMIN — HEPARIN SODIUM 1000 UNITS: 1000 INJECTION, SOLUTION INTRAVENOUS; SUBCUTANEOUS at 08:05

## 2024-05-29 RX ADMIN — SODIUM CHLORIDE: 9 INJECTION, SOLUTION INTRAVENOUS at 07:05

## 2024-05-29 RX ADMIN — METOPROLOL TARTRATE 25 MG: 25 TABLET, FILM COATED ORAL at 09:05

## 2024-05-29 RX ADMIN — FLUCONAZOLE 150 MG: 150 TABLET ORAL at 03:05

## 2024-05-29 RX ADMIN — TUBERCULIN PURIFIED PROTEIN DERIVATIVE 5 UNITS: 5 INJECTION, SOLUTION INTRADERMAL at 04:05

## 2024-05-29 RX ADMIN — ZINC SULFATE 220 MG (50 MG) CAPSULE 220 MG: CAPSULE at 12:05

## 2024-05-29 RX ADMIN — HEPARIN SODIUM 3000 UNITS: 1000 INJECTION, SOLUTION INTRAVENOUS; SUBCUTANEOUS at 08:05

## 2024-05-29 RX ADMIN — HEPARIN SODIUM 7500 UNITS: 5000 INJECTION INTRAVENOUS; SUBCUTANEOUS at 05:05

## 2024-05-29 RX ADMIN — SEVELAMER CARBONATE 0.8 G: 800 POWDER, FOR SUSPENSION ORAL at 12:05

## 2024-05-29 RX ADMIN — METOPROLOL TARTRATE 25 MG: 25 TABLET, FILM COATED ORAL at 12:05

## 2024-05-29 RX ADMIN — HEPARIN SODIUM 5000 UNITS: 5000 INJECTION INTRAVENOUS; SUBCUTANEOUS at 09:05

## 2024-05-29 RX ADMIN — ERYTHROPOIETIN 6100 UNITS: 10000 INJECTION, SOLUTION INTRAVENOUS; SUBCUTANEOUS at 08:05

## 2024-05-29 RX ADMIN — HEPARIN SODIUM 5000 UNITS: 5000 INJECTION INTRAVENOUS; SUBCUTANEOUS at 02:05

## 2024-05-29 RX ADMIN — PSYLLIUM HUSK 1 PACKET: 3.4 POWDER ORAL at 12:05

## 2024-05-29 RX ADMIN — PANTOPRAZOLE SODIUM 40 MG: 40 GRANULE, DELAYED RELEASE ORAL at 12:05

## 2024-05-29 NOTE — ASSESSMENT & PLAN NOTE
Adjusting insulin to account for diabetic TF    Patient's FSGs are controlled on current medication regimen.  Last A1c reviewed-   Lab Results   Component Value Date    HGBA1C 6.2 (H) 05/27/2024     Most recent fingerstick glucose reviewed-   Recent Labs   Lab 05/28/24  1603 05/28/24  2104 05/29/24  0727   POCTGLUCOSE 176* 171* 184*       Current correctional scale  Medium  Maintain anti-hyperglycemic dose as follows-   Antihyperglycemics (From admission, onward)    Start     Stop Route Frequency Ordered    05/27/24 1633  insulin aspart U-100 pen 0-5 Units         -- SubQ Before meals & nightly PRN 05/27/24 1533    05/25/24 2100  insulin glargine U-100 (Lantus) pen 10 Units         -- SubQ Nightly 05/25/24 1035        Hold Oral hypoglycemics while patient is in the hospital.  POCT glucose q6h

## 2024-05-29 NOTE — NURSING
Nurses Note -- 4 Eyes      5/29/2024   7:08 AM      Skin assessed during: Q Shift Change      [] No Altered Skin Integrity Present    []Prevention Measures Documented      [x] Yes- Altered Skin Integrity Present or Discovered   [] LDA Added if Not in Epic (Describe Wound)   [] New Altered Skin Integrity was Present on Admit and Documented in LDA   [] Wound Image Taken    Wound Care Consulted? No    Attending Nurse:  Beth Eastman RN/Staff Member:  Geno Orellana

## 2024-05-29 NOTE — PLAN OF CARE
Problem: Infection  Goal: Absence of Infection Signs and Symptoms  Outcome: Progressing     Problem: Skin Injury Risk Increased  Goal: Skin Health and Integrity  Outcome: Progressing     Problem: Adult Inpatient Plan of Care  Goal: Plan of Care Review  Outcome: Progressing  Goal: Patient-Specific Goal (Individualized)  Outcome: Progressing  Goal: Absence of Hospital-Acquired Illness or Injury  Outcome: Progressing  Goal: Optimal Comfort and Wellbeing  Outcome: Progressing  Goal: Readiness for Transition of Care  Outcome: Progressing     Problem: Bariatric Environmental Safety  Goal: Safety Maintained with Care  Outcome: Progressing     Problem: Device-Related Complication Risk (Hemodialysis)  Goal: Safe, Effective Therapy Delivery  Outcome: Progressing     Problem: Hemodynamic Instability (Hemodialysis)  Goal: Effective Tissue Perfusion  Outcome: Progressing     Problem: Infection (Hemodialysis)  Goal: Absence of Infection Signs and Symptoms  Outcome: Progressing     Problem: Diabetes Comorbidity  Goal: Blood Glucose Level Within Targeted Range  Outcome: Progressing     Problem: Adjustment to Illness (Sepsis/Septic Shock)  Goal: Optimal Coping  Outcome: Progressing     Problem: Glycemic Control Impaired (Sepsis/Septic Shock)  Goal: Blood Glucose Level Within Desired Range  Outcome: Progressing     Problem: Infection Progression (Sepsis/Septic Shock)  Goal: Absence of Infection Signs and Symptoms  Outcome: Progressing     Problem: Nutrition Impaired (Sepsis/Septic Shock)  Goal: Optimal Nutrition Intake  Outcome: Progressing     Problem: Fall Injury Risk  Goal: Absence of Fall and Fall-Related Injury  Outcome: Progressing     Problem: Adjustment to Illness (Chronic Kidney Disease)  Goal: Optimal Coping with Chronic Illness  Outcome: Progressing  Goal: Electrolyte Balance  Outcome: Progressing  Goal: Fluid Balance  Outcome: Progressing     Problem: Electrolyte Imbalance (Chronic Kidney Disease)  Goal: Electrolyte  Balance  Outcome: Progressing     Problem: Fluid Volume Excess (Chronic Kidney Disease)  Goal: Fluid Balance  Outcome: Progressing     Problem: Functional Decline (Chronic Kidney Disease)  Goal: Optimal Functional Ability  Outcome: Progressing     Problem: Hematologic Alteration (Chronic Kidney Disease)  Goal: Absence of Anemia Signs and Symptoms  Outcome: Progressing     Problem: Oral Intake Inadequate (Chronic Kidney Disease)  Goal: Optimal Oral Intake  Outcome: Progressing     Problem: Pain (Chronic Kidney Disease)  Goal: Acceptable Pain Control  Outcome: Progressing     Problem: Renal Function Impairment (Chronic Kidney Disease)  Goal: Minimize Renal Failure Effects  Outcome: Progressing     Problem: Restraint, Nonviolent  Goal: Absence of Harm or Injury  Outcome: Progressing     Problem: Wound  Goal: Optimal Coping  Outcome: Progressing  Goal: Optimal Functional Ability  Outcome: Progressing  Goal: Absence of Infection Signs and Symptoms  Outcome: Progressing  Goal: Improved Oral Intake  Outcome: Progressing  Goal: Optimal Pain Control and Function  Outcome: Progressing  Goal: Skin Health and Integrity  Outcome: Progressing  Goal: Optimal Wound Healing  Outcome: Progressing.  Patient in bed. No complaints voiced. NADN at this time. Safety measures in place. Call light in reach.

## 2024-05-29 NOTE — PLAN OF CARE
Recommendations     Modify TF formula to Glucerna 1.5 & as tolerated, increase to goal rate of 50 mL/hr = 1800 kcals, 99 g of protein, 911 mL fluid.  RD to monitor & follow-up.     Goals: Meet % EEN, EPN by RD f/u date  Nutrition Goal Status: goal not met  Communication of RD Recs: reviewed with physician

## 2024-05-29 NOTE — ASSESSMENT & PLAN NOTE
Nutrition consulted. Most recent weight and BMI monitored-     Measurements:  Wt Readings from Last 1 Encounters:   05/29/24 105.5 kg (232 lb 9.4 oz)   Body mass index is 36.43 kg/m².    Patient has been screened and assessed by RD.    Malnutrition Type:  Context: acute illness or injury  Level: moderate    Malnutrition Characteristic Summary:  Weight Loss (Malnutrition): 10% in 6 months  Subcutaneous Fat (Malnutrition): mild depletion  Muscle Mass (Malnutrition): mild depletion  Fluid Accumulation (Malnutrition): mild    Interventions/Recommendations (treatment strategy):  1.    -- TF  -- going for swallow study with SLP to assess possibility of pleasure oral feedings --> failed repeatedly

## 2024-05-29 NOTE — NURSING
Nurses Note -- 4 Eyes      05/28/2024   07:00 PM      Skin assessed during: Q Shift Change      [] No Altered Skin Integrity Present    []Prevention Measures Documented      [x] Yes- Altered Skin Integrity Present or Discovered   [] LDA Added if Not in Epic (Describe Wound)   [] New Altered Skin Integrity was Present on Admit and Documented in LDA   [] Wound Image Taken    Wound Care Consulted? Yes    Attending Nurse:  Reyna Eastman RN/Staff Member: Pat

## 2024-05-29 NOTE — PLAN OF CARE
Met with patient's daughter to discuss her mother's discharge plan. Aleks stated she is interested in her mother going to [facility] with penitentiary placement. This CM encouraged her to call the facility today to make an appointment to start the admission process. She expressed understanding .IM4 Team made aware of above conversation. Will continue to follow.    Sara Loredo RN  Ext [phone]

## 2024-05-29 NOTE — PROGRESS NOTES
[facility] - Telemetry Stepdown  Adult Nutrition  Progress Note    SUMMARY       Recommendations    Modify TF formula to Glucerna 1.5 & as tolerated, increase to goal rate of 50 mL/hr = 1800 kcals, 99 g of protein, 911 mL fluid.  RD to monitor & follow-up.    Goals: Meet % EEN, EPN by RD f/u date  Nutrition Goal Status: goal not met  Communication of RD Recs: reviewed with physician    Assessment and Plan    Moderate malnutrition    Malnutrition Type:  Context: acute illness or injury  Level: moderate    Related to (etiology):   Inability to consume sufficient energy     Signs and Symptoms (as evidenced by):   Weight loss, NFPE, Edema    Malnutrition Characteristic Summary:  Weight Loss (Malnutrition): 10% in 6 months  Subcutaneous Fat (Malnutrition): mild depletion  Muscle Mass (Malnutrition): mild depletion  Fluid Accumulation (Malnutrition): mild    Interventions/Recommendations (treatment strategy):  Collaboration of nutrition care w/ other providers  EN    Nutrition Diagnosis Status:   Continues     Malnutrition Assessment    Malnutrition Context: acute illness or injury  Malnutrition Level: moderate    Weight Loss (Malnutrition): 10% in 6 months  Subcutaneous Fat (Malnutrition): mild depletion  Muscle Mass (Malnutrition): mild depletion  Fluid Accumulation (Malnutrition): mild     Reason for Assessment    Reason For Assessment: RD follow-up  Diagnosis: other (see comments) (Acute cystitis with hematuria)  Relevant Medical History: CVA, PEG, DM  Interdisciplinary Rounds: did not attend    General Information Comments: Remains NPO (per SLP). Pt URIEL for HD - continues to tolerate TFs (per RN documentation). Noted formula change 2/2 elevated BG. Moderate malnutrition diagnosis continues; please see PES statement for details.   Nutrition Discharge Planning: Adequate nutrition    Nutrition/Diet History    Spiritual, Cultural Beliefs, Adventism Practices, Values that Affect Care: no  Food Allergies: NKFA  Factors  "Affecting Nutritional Intake: NPO    Anthropometrics    Temp: 98 °F (36.7 °C)  Height Method: Stated  Height: 5' 7" (170.2 cm)  Height (inches): 67 in  Weight Method: Bed Scale  Weight: 105.5 kg (232 lb 9.4 oz)  Weight (lb): 232.59 lb  Ideal Body Weight (IBW), Female: 135 lb  % Ideal Body Weight, Female (lb): 172.29 %  BMI (Calculated): 36.4  BMI Grade: 35 - 39.9 - obesity - grade II  Usual Body Weight (UBW), kg: (!) 136.4 kg  % Usual Body Weight: 90  % Weight Change From Usual Weight: -10.19 %    Lab/Procedures/Meds    Pertinent Labs Reviewed: reviewed  Pertinent Labs Comments: Creat 5.4, GFR 8.9, P 4.7, A1C 6.2  Pertinent Medications Reviewed: reviewed  Pertinent Medications Comments: Psllium husk    Estimated/Assessed Needs    Weight Used For Calorie Calculations: 105.5 kg (232 lb 9.4 oz)    Energy Calorie Requirements (kcal): 2030 kcal/d  Energy Need Method: [place]-St Jeor (1.2 PAL)    Protein Requirements: 95 g/d (.9 g/kg)  Weight Used For Protein Calculations: 105.5 kg (232 lb 9.4 oz)    Estimated Fluid Requirement Method: other (see comments) (Per MD)  RDA Method (mL): 2030    CHO Requirement: 254g    Nutrition Prescription Ordered    Current Diet Order: NPO  Current Nutrition Support Formula Ordered: Diabetisource AC  Current Nutrition Support Rate Ordered: 40 mL/hr    Evaluation of Received Nutrient/Fluid Intake    Enteral Calories (kcal): 1152  Enteral Protein (gm): 58  Enteral (Free Water) Fluid (mL): 785  Free Water Flush Fluid (mL): 400    % Kcal Needs: 57%  % Protein Needs: 61%    I/O: +1.4L since 5/15    Energy Calories Required: not meeting needs  Protein Required: not meeting needs  Fluid Required: other (see comments) (Per MD)    Comments: LBM: 5/28    Tolerance: tolerating    Nutrition Risk    Level of Risk/Frequency of Follow-up:  (1x/week)     Monitor and Evaluation    Food and Nutrient Intake: enteral nutrition intake  Food and Nutrient Adminstration: enteral and parenteral nutrition " administration  Physical Activity and Function: nutrition-related ADLs and IADLs  Anthropometric Measurements: weight, weight change  Biochemical Data, Medical Tests and Procedures: inflammatory profile, lipid profile, glucose/endocrine profile, gastrointestinal profile, electrolyte and renal panel  Nutrition-Focused Physical Findings: overall appearance     Nutrition Follow-Up    RD Follow-up?: Yes

## 2024-05-29 NOTE — PROGRESS NOTES
05/29/24 0745 05/29/24 0751        Hemodialysis Catheter 04/30/24 1708 right internal jugular   Placement Date/Time: 04/30/24 1708   Present Prior to Hospital Arrival?: Yes  Hand Hygiene: Performed  Barrier Precautions: Performed  Skin Antisepsis: ChloraPrep  Hemodialysis Catheter Type: Tunneled catheter  Location: right internal jugular  Cathet...   Site Assessment No drainage  --    Line Securement Device Secured with sutureless device  --    Dressing Type CHG impregnated dressing/sponge  --    Dressing Status Clean;Dry;Intact  --    Dressing Intervention Integrity maintained  --    Date on Dressing 05/28/24  --    Dressing Due to be Changed 06/04/24  --    Venous Patency/Care accessed;blood return present;flushed w/o difficulty  --    Arterial Patency/Care accessed;blood return present;flushed w/o difficulty  --    During Hemodialysis Assessment   Blood Flow Rate (mL/min)  --  400 mL/min   Dialysate Flow Rate (mL/min)  --  700 ml/min   Ultrafiltration Rate (mL/Hr)  --  900 mL/Hr   Arteriovenous Lines Secure  --  Yes   Arterial Pressure (mmHg)  --  -130 mmHg   Venous Pressure (mmHg)  --  110   Blood Volume Processed (Liters)  --  0 L   UF Removed (mL)  --  0 mL   TMP  --  20   Venous Line in Air Detector  --  Yes   Intake (mL)  --  250 mL   Transducer Dry  --  Yes   Access Visible  --  Yes    notified of access issue?  --  N/A   Heparin given?  --  N/A   Intra-Hemodialysis Comments  --  HD started     Patient arrived [facility] by bed. Isolation HD started via right subclavian CVC.

## 2024-05-29 NOTE — PROGRESS NOTES
05/29/24 1121        Hemodialysis Catheter 04/30/24 1708 right internal jugular   Placement Date/Time: 04/30/24 1708   Present Prior to Hospital Arrival?: Yes  Hand Hygiene: Performed  Barrier Precautions: Performed  Skin Antisepsis: ChloraPrep  Hemodialysis Catheter Type: Tunneled catheter  Location: right internal jugular  Cathet...   Site Assessment No drainage   Line Securement Device Secured with sutureless device   Dressing Type CHG impregnated dressing/sponge   Dressing Status Clean;Dry;Intact   Dressing Intervention Integrity maintained   Date on Dressing 05/29/24   Dressing Due to be Changed 06/05/24   Venous Patency/Care deaccessed;flushed w/o difficulty;heparin locked   Arterial Patency/Care deaccessed;flushed w/o difficulty;heparin locked   During Hemodialysis Assessment   Blood Flow Rate (mL/min) 400 mL/min   Dialysate Flow Rate (mL/min) 700 ml/min   Ultrafiltration Rate (mL/Hr) 900 mL/Hr   Arteriovenous Lines Secure Yes   Arterial Pressure (mmHg) -130 mmHg   Venous Pressure (mmHg) 100   TMP 20   Venous Line in Air Detector Yes   Transducer Dry Yes   Access Visible Yes   Intra-Hemodialysis Comments HD completed   Post-Hemodialysis Assessment   Rinseback Volume (mL) 250 mL   Blood Volume Processed (Liters) 81.8 L   Dialyzer Clearance Moderately streaked   Duration of Treatment 210 minutes   Total UF (mL) 3150 mL   Net Fluid Removal 2500   Patient Response to Treatment Earl. well     Patient left [facility] by bed NAD.

## 2024-05-29 NOTE — PLAN OF CARE
Problem: Infection  Goal: Absence of Infection Signs and Symptoms  Outcome: Progressing     Problem: Skin Injury Risk Increased  Goal: Skin Health and Integrity  Outcome: Progressing     Problem: Adult Inpatient Plan of Care  Goal: Plan of Care Review  Outcome: Progressing  Goal: Patient-Specific Goal (Individualized)  Outcome: Progressing  Goal: Absence of Hospital-Acquired Illness or Injury  Outcome: Progressing  Goal: Optimal Comfort and Wellbeing  Outcome: Progressing  Goal: Readiness for Transition of Care  Outcome: Progressing     Problem: Bariatric Environmental Safety  Goal: Safety Maintained with Care  Outcome: Progressing     Problem: Device-Related Complication Risk (Hemodialysis)  Goal: Safe, Effective Therapy Delivery  Outcome: Progressing     Problem: Hemodynamic Instability (Hemodialysis)  Goal: Effective Tissue Perfusion  Outcome: Progressing   Patient with HD net 2.5L off. Therapy pulled patient into bariatric chair. Wound care done.

## 2024-05-29 NOTE — PT/OT/SLP PROGRESS
"Physical Therapy Treatment    Patient Name:  Sarah Saravia   MRN:  [id number]    Recommendations:     Discharge Recommendations: Moderate Intensity Therapy  Discharge Equipment Recommendations: lift device, hospital bed, wheelchair  Barriers to discharge:  evolving clinical presentation    Assessment:     Sarah Saravia is a 53 y.o. female admitted with a medical diagnosis of Acute cystitis with hematuria.  She presents with the following impairments/functional limitations: weakness, impaired endurance, impaired self care skills, impaired functional mobility, impaired balance, decreased coordination, decreased upper extremity function, decreased lower extremity function, decreased safety awareness, pain, decreased ROM, impaired coordination, impaired skin, impaired cardiopulmonary response to activity Pt unable to tolerate sit<>stand transitions today due to pain. Pt requires max/total assistance with sit <> stand transitions, OOB/BTB transfers, and balance to prevent falls due to weakness, instability, pain, fatigue, fear of falling, and general deconditioning.      Rehab Prognosis: Fair; patient would benefit from acute skilled PT services to address these deficits and reach maximum level of function.    Recent Surgery: * No surgery found *      Plan:     During this hospitalization, patient to be seen 3 x/week to address the identified rehab impairments via therapeutic activities, therapeutic exercises, neuromuscular re-education and progress toward the following goals:    Plan of Care Expires:  06/22/24    Subjective     Chief Complaint: pt agreeable to therapy  Patient/Family Comments/goals: Daughter asked, "I think she is dirty, can someone come clean her up?  Pain/Comfort:  Pain Rating 1:  (no rating provided (pt grimaces when activity))  Location - Side 1: Bilateral  Location - Orientation 1: generalized  Location 1: knee  Pain Addressed 1: Reposition, Cessation of Activity, Distraction      Objective: "     Communicated with nurse (Beth) prior to session.  Patient found up in chair with telemetry, pulse ox (continuous), peripheral IV, PEG Tube (daughter present) upon PT entry to room.     General Precautions: Standard, aphasia, aspiration, fall, NPO, contact (ESBL/MDRO in urine)  Orthopedic Precautions: N/A  Braces: N/A  Respiratory Status: Room air     Functional Mobility:  Bed Mobility:     Rolling Left:  total A x2, HOB flat, no use of rail, assistance required for hygiene   Rolling Right: total A x2, HOB flat, no use of rail, assistance required for hygiene   Scooting: anteriorly/posterior on the EOB total A x2 for hip clearance via draw sheet and B knee blocking  Supine to Sit: max A x2 for trunk elevation and LE clerance  Sit to Supine: total A x2 for LE elevation and trunk clearance  Transfers:     Sit to Stand:  from the EOB total A x3 [id number] with hand-held assist and draw sheet. Pt required cueing for hip extension, anterior WS, full knee extension, upright posture, fwd gaze and B knee blocking. Pt spontaneously responding to cueing from daughter. Pt unable to achieve full erect posture   Bed to Chair: dependent x3  with  slide board and draw sheet  using  Slide Board and Drawsheet  Balance: static sitting at the EOB x20min CGA.       AM-PAC 6 CLICK MOBILITY  Turning over in bed (including adjusting bedclothes, sheets and blankets)?: 2  Sitting down on and standing up from a chair with arms (e.g., wheelchair, bedside commode, etc.): 1  Moving from lying on back to sitting on the side of the bed?: 2  Moving to and from a bed to a chair (including a wheelchair)?: 1  Need to walk in hospital room?: 1  Climbing 3-5 steps with a railing?: 1  Basic Mobility Total Score: 8       Treatment & Education:  Pt educated on the importance of getting OOB and spending time sitting on the EOB. Pt was educated on the effects of immobility and moving throughout the day to decrease recovery time and promote healing. Pt  was instructed to call for hospital staff assistance when needed and not to get up without assistance.       Patient left  up in Medi-chair  with all lines intact, call button in reach, nurse notified, and nurse and daughter present..    GOALS:   Multidisciplinary Problems       Physical Therapy Goals          Problem: Physical Therapy    Goal Priority Disciplines Outcome Goal Variances Interventions   Physical Therapy Goal     PT, PT/OT Progressing     Description: Goals to be met by: 24   Goals remain appropriate 5/3/2024 to be met by 24  Goals updated 2024 to be met by 24  Goals updated 24 to be met by 24    Patient will increase functional independence with mobility by performin. Supine to sit with Maximum Assistance  2. Sit to supine with Maximum Assistance  3. Rolling to Left and Right with Moderate Assistance.  4. Sitting at edge of bed 5 minutes with Moderate Assistance- MET , monitor consistency  4a. Sitting at edge of bed 10 minutes with Supervision  5. Lower extremity exercise program x20 reps per handout, with assistance as needed  6. Sit to stand transfer with Maximum Assistance with or without LRAD  7. Stand for 2 minutes with Maximum Assistance with or without LRAD                         Time Tracking:     PT Received On: 24  PT Start Time: 1233    PT Stop Time: 1327  PT Total Time (min): 54 min     Billable Minutes: Therapeutic Activity 54    Treatment Type: Treatment  PT/PTA: PTA     Number of PTA visits since last PT visit: 3     2024

## 2024-05-29 NOTE — PROGRESS NOTES
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 History was given from patient's daughter. She was undergoing dialysis today and noticed she was lethargic, less alert than usual self. Pt completed dialysis and still not acting herself. EMS was called, fever of a 100.  On chart review, she did have an episode of large volume emesis around 1700.  Per EMS, she had a slightly elevated temp 100.0°F, glucose 300s.     In the ED: UA 2+ leuks, >100 WBC, many bacteria, WBC 17, CT abd/pelvis concerning for cystitis. Given vanc/zosyn    Overview/Hospital Course:  Patient was admitted to Hospital Medicine service for medical management and evaluation of urosepsis. Patient was continued on vanc/zosyn. Vascular Neurology consulted concerning mental status change with the recommendation to initiate ASA 81 QD and to obtain an MRI to r/o new stroke. Imaging pending. Nephrology was consulted for regularly scheduled dialysis. Afternoon of 4/12, patient was unable to tolerate filtration of volume during dialsis 2/2 hypotension. Patient received 500cc bolus and was transferred back to floor after finishing the session without volume removed. Will monitor for signs of volume overload. Tailoring insulin regimen while uptitrating tube feeds to goal rate. Staph Epi in all 4 bottles; ID with recommendation to continue Vanc & de-escalate meropenem to ertapenem on 04/15 through 4/16. Patient completed IV course of UTI coverage. Patient tolerated volume removal well in dialysis  throughout the rest of her hospital stay. Pending discharge to LTACH pending bed availability. Patient without BM with one episode of vomiting overnight 4/17. KUB with bowel gas and low concern for obstruction with no transition point noted. Escalating bowel regimen. Pending placement as of 4/22. Pulm consult placed to evaluate for possible trach downsize & eventual decannulation to assist placement if safe. Trach capping trial per pulm for 48h and will assess for decannulation on Monday. DVT studies negative. Pulm successfully decannulated pt 4/30, IR exchanged TDC. Pending swallow study with SLP and waiting for dispo options. Pt failed swallow study, placement pending. Working with PT on mobilize pt to sitting in a chair to expand placement options. Pt successfully mobilized using sandee lift but required 2 people to do so. Discussing dispo plan with family, likely d/c home if HD, DME needs able to be met. Pending acceptance at outpatient HD center. Ongoing placement difficulties d/t need for accepting HD facility to have sandee lift with 2 personnel to operate. Family meeting to discuss disposition options planned for Thursday 5/23 at 1 PM. Per family meeting referral sent to [facility], pending placement    Interval History: NAEON. Pending placement      Objective:     Vital Signs (Most Recent):  Temp: 98.1 °F (36.7 °C) (05/29/24 1218)  Pulse: 106 (05/29/24 1218)  Resp: 19 (05/29/24 1218)  BP: 118/80 (05/29/24 1218)  SpO2: 99 % (05/29/24 1218) Vital Signs (24h Range):  Temp:  [97.7 °F (36.5 °C)-98.6 °F (37 °C)] 98.1 °F (36.7 °C)  Pulse:  [] 106  Resp:  [17-19] 19  SpO2:  [97 %-100 %] 99 %  BP: (113-150)/(72-90) 118/80     Weight: 105.5 kg (232 lb 9.4 oz)  Body mass index is 36.43 kg/m².    Intake/Output Summary (Last 24 hours) at 5/29/2024 1329  Last data filed at 5/29/2024 1148  Gross per 24 hour   Intake 500.02 ml   Output 3150 ml   Net -2649.98 ml         Physical Exam  Vitals and nursing note reviewed.    Constitutional:       General: She is not in acute distress.     Appearance: She is not toxic-appearing or diaphoretic.   HENT:      Head: Normocephalic and atraumatic.   Eyes:      General:         Right eye: No discharge.         Left eye: No discharge.      Conjunctiva/sclera: Conjunctivae normal.   Neck:      Comments: Decannulated  Cardiovascular:      Rate and Rhythm: Normal rate and regular rhythm.      Heart sounds: Normal heart sounds.   Pulmonary:      Effort: Pulmonary effort is normal. No respiratory distress.   Abdominal:      General: Abdomen is flat. There is no distension.      Tenderness: There is no abdominal tenderness.      Comments: PEG tube in place with new skin breakdown and retraction   Musculoskeletal:      Right lower leg: No edema.      Left lower leg: No edema.   Skin:     General: Skin is warm and dry.   Neurological:      General: No focal deficit present.      Mental Status: She is alert.      Comments: Nonverbal             Significant Labs: All pertinent labs within the past 24 hours have been reviewed.    Significant Imaging: I have reviewed all pertinent imaging results/findings within the past 24 hours.    Assessment/Plan:      * Acute cystitis with hematuria  resolved    Pt presenting with AMS and worsening lethargy. Pt is non-verbal at baseline, does not follow commands, but was less responsive than normal. Pt found to have a fever. Urinary source suspected based off of urine and CT abd/pelvis. Pt has many prior resistant bacterial infections including urinary sources. Has prior with sensitivity to zosyn and has also improved off ED dose of vanc/zosyn. Lactic elevated a 3.8->3.49 prior to completion of fluid bolus 500ml.    Patient with new fever and tachycardia on 4/25. Low threshold to initiate additional infectious workup and repeat UA.     Plan  S/p course of vanc/ertapenem per ID. Course completed 4/16.  Daily cbc  Contact precautions    *on contact precautions  indefinitely while patient still makes urine given pt incontinent per infection control    Vulvovaginal candidiasis  Noted 5/29, given 150mg fluconazole x1      Irritant contact dermatitis due friction or contact with other specified body fluids  Wound care      Debility  Needs:  Wheelchair: patient has a mobility limitation that significantly impairs her ability to participate in one or more mobility related activities of daily living (MRADL's) such as toileting, feeding, dressing, grooming, and bathing in customary locations in the home.  The mobility limitation cannot be sufficiently resolved by the use of a cane or walker.   The use of a manual wheelchair will significantly improve the patient's ability to participate in MRADLS and the patient will use it on regular basis in the home.  Family/pt has expressed her willingness to use a manual wheelchair in the home.  She also has a caregiver who is capable of assisting in mobility.    Mrs Saravia requires a hospital bed due to her requiring positioning of the body in ways not feasible with an ordinary bed to alleviate pain/ is completely immobile /or limited mobility and cannot independently make changes in body position without the use of the bed.  The positioning of the body cannot be sufficiently resolved by the use of pillows and wedges    Patient has a mobility limitation that significantly impairs their ability to participate in one or more mobility related activities of daily living, including toileting. This deficit can be resolved by using a bedside commode. Patient demonstrates mobility limitations that will cause them to be confined to one room at home without bathroom access for up to 30 days. Using a bedside commode will greatly improve the patient's ability to participate in MRADLs       Complication of vascular dialysis catheter  Cath intermittently clogging, not resolving with cath-hedy, going for IR venogram 4/30 with possible exchange. S/p exchange  with IR on 4/30      Constipation  RESOLVED with Bowel regimen  Patient without BM x5d. One episode of vomitus night of 4/17. Some concern for bowel obstruction. Tolerating continuous tube feeds at goal rate with 0cc on residuals. Physical exam with bowel sounds, soft nontender abdomen. KUB without concern for obstruction with bowel gas, lack of transition point.    Patient now with regular bowel movements on bowel regimen.     Plan  - Miralax BID, Senna BID  - compazine PRN    Moderate malnutrition  Nutrition consulted. Most recent weight and BMI monitored-     Measurements:  Wt Readings from Last 1 Encounters:   05/29/24 105.5 kg (232 lb 9.4 oz)   Body mass index is 36.43 kg/m².    Patient has been screened and assessed by RD.    Malnutrition Type:  Context: acute illness or injury  Level: moderate    Malnutrition Characteristic Summary:  Weight Loss (Malnutrition): 10% in 6 months  Subcutaneous Fat (Malnutrition): mild depletion  Muscle Mass (Malnutrition): mild depletion  Fluid Accumulation (Malnutrition): mild    Interventions/Recommendations (treatment strategy):  1.    -- TF  -- going for swallow study with SLP to assess possibility of pleasure oral feedings --> failed repeatedly    Prolonged QT interval  Qtc 526 [04/10/24]      limit Qtc prolonging drugs as able    Spastic hemiplegia of right dominant side as late effect of cerebrovascular disease  Important that patient is able to sit in chair for 4h for dialysis placement needs, even if she requires lift/assistance getting into the chair     This patient has Chronic right hemiplegia due to stroke. Physical therapy services has been scheduled. Continue all standard measures for pressure injury prevention and consult wound care for any wounds (chronic or acute).    ESRD (end stage renal disease) on dialysis  Creatine stable for now. BMP reviewed- noted Estimated Creatinine Clearance: 15.1 mL/min (A) (based on SCr of 5.4 mg/dL (H)). according to latest data.  Based on current GFR, CKD stage is end stage.  Monitor UOP and serial BMP and adjust therapy as needed. Renally dose meds. Avoid nephrotoxic medications and procedures.    -- HD MWF  -- nephro following    Status post tracheostomy  Resolved, decannulated 4/30    Acute encephalopathy  Pt is non-verbal and does not follow commands at baseline. Vascular Neurology consulted given acute change from baseline. MRI with concern for evolution of prior strokes with noted differential of T2 hyperintensities including vasculitis vs demyelination. Discussed with Vascular Neurology; low concern for ongoing vasculitis/demyelination, likely represents typical evolution of prior infarcts.    Patient at baseline as of 4/22.     Plan  RESOLVED  - Repeat head imaging if concern of new change in mental status/focal deficit    Type 2 diabetes mellitus with hyperglycemia, with long-term current use of insulin  Adjusting insulin to account for diabetic TF    Patient's FSGs are controlled on current medication regimen.  Last A1c reviewed-   Lab Results   Component Value Date    HGBA1C 6.2 (H) 05/27/2024     Most recent fingerstick glucose reviewed-   Recent Labs   Lab 05/28/24  1603 05/28/24  2104 05/29/24  0727   POCTGLUCOSE 176* 171* 184*       Current correctional scale  Medium  Maintain anti-hyperglycemic dose as follows-   Antihyperglycemics (From admission, onward)      Start     Stop Route Frequency Ordered    05/27/24 1633  insulin aspart U-100 pen 0-5 Units         -- SubQ Before meals & nightly PRN 05/27/24 1533    05/25/24 2100  insulin glargine U-100 (Lantus) pen 10 Units         -- SubQ Nightly 05/25/24 1035          Hold Oral hypoglycemics while patient is in the hospital.  POCT glucose q6h    Hyponatremia  Patient has hyponatremia which is uncontrolled,We will aim to correct the sodium by 4-6mEq in 24 hours. We will monitor sodium Daily. The hyponatremia is due to ESRD. Discussed with nephrology, dialysate bath adjusted to  account for and correct hyponatremia.  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 Pt underwent extensive course, currently still healing from this, but no longer has wound vac. Pt's wound currently covered with bandage. Picture in media tabs    Plan  Wound care        VTE Risk Mitigation (From admission, onward)           Ordered     heparin (porcine) injection 5,000 Units  Every 8 hours         05/29/24 0823     heparin (porcine) injection 1,000 Units  As needed (PRN)         05/21/24 1017     heparin (porcine) injection 3,000 Units  As needed (PRN)         04/17/24 0825                    Discharge Planning   ZEKE: 6/7/2024     Code Status: Full Code   Is the patient medically ready for discharge?: No    Reason for patient still in hospital (select all that apply): Patient trending condition  Discharge Plan A: Skilled Nursing Facility                  Osito Dos Santos MD  Department of Hospital Medicine   [facility] - Telemetry Stepdown

## 2024-05-29 NOTE — PT/OT/SLP PROGRESS
Speech Language Pathology      Sarah DOE Manjit  MRN: [id number]    Patient not seen today secondary to  (pt at  on first attempt, then working with PT 2nd attempt). Will follow-up 5/30.

## 2024-05-29 NOTE — PROGRESS NOTES
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    Hypermagnesemia 04/11/2024    POA, Mg 3.2  Daily chem       Morbid obesity     Necrotizing fasciitis        Social History     Socioeconomic History    Marital status: Single   Tobacco Use    Smoking status: Unknown     Social Determinants of Health     Financial Resource Strain: Patient Unable To Answer (3/14/2024)    Overall Financial Resource Strain (CARDIA)     Difficulty of Paying Living Expenses: Patient unable to answer   Recent Concern: Financial Resource Strain - High Risk (3/9/2024)    Overall Financial Resource Strain (CARDIA)     Difficulty of Paying Living Expenses: Very hard   Food Insecurity: Patient Unable To Answer (3/14/2024)    Hunger Vital Sign     Worried About Running Out of Food in the Last Year: Patient unable to answer     Ran Out of Food in the Last Year: Patient unable to answer   Transportation Needs: Patient Unable To Answer (3/14/2024)    PRAPARE - Transportation     Lack of Transportation (Medical): Patient unable to answer     Lack of Transportation (Non-Medical): Patient unable to answer   Physical Activity: Inactive (3/13/2024)    Exercise Vital Sign     Days of Exercise per Week: 0 days     Minutes of Exercise per Session: 0 min   Stress: Patient Unable To Answer (3/14/2024)    [employer] - Occupational Stress Questionnaire     Feeling of Stress : Patient unable to answer   Housing Stability: Patient Unable To Answer (3/14/2024)    Housing Stability Vital Sign     Unable to Pay for Housing in the Last Year: Patient unable to answer     Number of Places Lived in the Last Year: 1     Unstable Housing in the Last Year: Patient unable to answer   Recent Concern: Housing Stability - High  Risk (3/9/2024)    Housing Stability Vital Sign     Unable to Pay for Housing in the Last Year: Yes     Unstable Housing in the Last Year: No       Precautions:     Allergies as of 04/10/2024    (No Known Allergies)       WOC Assessment Details/Treatment     Attempt to see pt for targeted assmt of PEG site- out of the room with bed at this time. Will return for follow-up as planned.    Reviewed chart for this encounter.   See Flow Sheet for findings.    Bedside nursing to continue care & monitoring.  Bedside nursing to maintain pressure injury prevention interventions.  Current documented Tomas score is 14 with a nutrition sub-scale score of 3.    Will return.    Niki TIWARIN, RN, CWOCN            05/29/2024

## 2024-05-29 NOTE — SUBJECTIVE & OBJECTIVE
Interval History: NAEON. Pending placement      Objective:     Vital Signs (Most Recent):  Temp: 98.1 °F (36.7 °C) (05/29/24 1218)  Pulse: 106 (05/29/24 1218)  Resp: 19 (05/29/24 1218)  BP: 118/80 (05/29/24 1218)  SpO2: 99 % (05/29/24 1218) Vital Signs (24h Range):  Temp:  [97.7 °F (36.5 °C)-98.6 °F (37 °C)] 98.1 °F (36.7 °C)  Pulse:  [] 106  Resp:  [17-19] 19  SpO2:  [97 %-100 %] 99 %  BP: (113-150)/(72-90) 118/80     Weight: 105.5 kg (232 lb 9.4 oz)  Body mass index is 36.43 kg/m².    Intake/Output Summary (Last 24 hours) at 5/29/2024 1329  Last data filed at 5/29/2024 1148  Gross per 24 hour   Intake 500.02 ml   Output 3150 ml   Net -2649.98 ml         Physical Exam  Vitals and nursing note reviewed.   Constitutional:       General: She is not in acute distress.     Appearance: She is not toxic-appearing or diaphoretic.   HENT:      Head: Normocephalic and atraumatic.   Eyes:      General:         Right eye: No discharge.         Left eye: No discharge.      Conjunctiva/sclera: Conjunctivae normal.   Neck:      Comments: Decannulated  Cardiovascular:      Rate and Rhythm: Normal rate and regular rhythm.      Heart sounds: Normal heart sounds.   Pulmonary:      Effort: Pulmonary effort is normal. No respiratory distress.   Abdominal:      General: Abdomen is flat. There is no distension.      Tenderness: There is no abdominal tenderness.      Comments: PEG tube in place with new skin breakdown and retraction   Musculoskeletal:      Right lower leg: No edema.      Left lower leg: No edema.   Skin:     General: Skin is warm and dry.   Neurological:      General: No focal deficit present.      Mental Status: She is alert.      Comments: Nonverbal             Significant Labs: All pertinent labs within the past 24 hours have been reviewed.    Significant Imaging: I have reviewed all pertinent imaging results/findings within the past 24 hours.

## 2024-05-29 NOTE — ASSESSMENT & PLAN NOTE
Patient has hyponatremia which is uncontrolled,We will aim to correct the sodium by 4-6mEq in 24 hours. We will monitor sodium Daily. The hyponatremia is due to ESRD. Discussed with nephrology, dialysate bath adjusted to account for and correct hyponatremia.  Recent Labs   Lab 05/29/24  0554   *     IMPROVING  - Daily morning CMP  - Continue to montior

## 2024-05-29 NOTE — ASSESSMENT & PLAN NOTE
Creatine stable for now. BMP reviewed- noted Estimated Creatinine Clearance: 15.1 mL/min (A) (based on SCr of 5.4 mg/dL (H)). according to latest data. Based on current GFR, CKD stage is end stage.  Monitor UOP and serial BMP and adjust therapy as needed. Renally dose meds. Avoid nephrotoxic medications and procedures.    -- HD MWF  -- nephro following

## 2024-05-29 NOTE — PROGRESS NOTES
[facility] NEPHROLOGY STAFF HEMODIALYSIS NOTE     Patient currently on hemodialysis for removal of uremic toxins and volume.     Patient seen and evaluated on hemodialysis, tolerating treatment, see HD flowsheet for vitals and assessments.    Labs have been reviewed and the dialysate bath has been adjusted.       Assessment/Plan:    -Pending placement   -Patient seen on HD, tolerating treatment well, w/o complaints   -UF goal of 2.5, NA bath adjusted   -Renal diet, if not NPO   -Strict I/O's and daily weights  -Daily renal function panels  -Keep MAP >65 while on HD   -Hgb goal 10-11, continue epo   -start phos sevelamer 0.8 g TIDWM  -Will continue to follow while inpatient     Delfina Shi DNP-FNP, C  Nephrology  Pager: [phone]

## 2024-05-30 LAB
POCT GLUCOSE: 179 MG/DL (ref 70–110)
POCT GLUCOSE: 197 MG/DL (ref 70–110)
POCT GLUCOSE: 198 MG/DL (ref 70–110)
POCT GLUCOSE: 216 MG/DL (ref 70–110)

## 2024-05-30 PROCEDURE — 97530 THERAPEUTIC ACTIVITIES: CPT

## 2024-05-30 PROCEDURE — 92526 ORAL FUNCTION THERAPY: CPT

## 2024-05-30 PROCEDURE — 25000242 PHARM REV CODE 250 ALT 637 W/ HCPCS

## 2024-05-30 PROCEDURE — 25000003 PHARM REV CODE 250

## 2024-05-30 PROCEDURE — 20600001 HC STEP DOWN PRIVATE ROOM

## 2024-05-30 PROCEDURE — 25000003 PHARM REV CODE 250: Performed by: NURSE PRACTITIONER

## 2024-05-30 PROCEDURE — 27000207 HC ISOLATION

## 2024-05-30 PROCEDURE — 63600175 PHARM REV CODE 636 W HCPCS

## 2024-05-30 PROCEDURE — 97535 SELF CARE MNGMENT TRAINING: CPT

## 2024-05-30 PROCEDURE — 99232 SBSQ HOSP IP/OBS MODERATE 35: CPT | Mod: ,,, | Performed by: NURSE PRACTITIONER

## 2024-05-30 RX ORDER — SODIUM CHLORIDE 9 MG/ML
INJECTION, SOLUTION INTRAVENOUS ONCE
Status: COMPLETED | OUTPATIENT
Start: 2024-05-31 | End: 2024-05-31

## 2024-05-30 RX ADMIN — HEPARIN SODIUM 5000 UNITS: 5000 INJECTION INTRAVENOUS; SUBCUTANEOUS at 09:05

## 2024-05-30 RX ADMIN — INSULIN GLARGINE 10 UNITS: 100 INJECTION, SOLUTION SUBCUTANEOUS at 08:05

## 2024-05-30 RX ADMIN — METOPROLOL TARTRATE 25 MG: 25 TABLET, FILM COATED ORAL at 08:05

## 2024-05-30 RX ADMIN — SEVELAMER CARBONATE 0.8 G: 800 POWDER, FOR SUSPENSION ORAL at 12:05

## 2024-05-30 RX ADMIN — PANTOPRAZOLE SODIUM 40 MG: 40 GRANULE, DELAYED RELEASE ORAL at 09:05

## 2024-05-30 RX ADMIN — Medication 500 MG: at 08:05

## 2024-05-30 RX ADMIN — INSULIN ASPART 2 UNITS: 100 INJECTION, SOLUTION INTRAVENOUS; SUBCUTANEOUS at 07:05

## 2024-05-30 RX ADMIN — ZINC SULFATE 220 MG (50 MG) CAPSULE 220 MG: CAPSULE at 08:05

## 2024-05-30 RX ADMIN — SEVELAMER CARBONATE 0.8 G: 800 POWDER, FOR SUSPENSION ORAL at 08:05

## 2024-05-30 RX ADMIN — ATORVASTATIN CALCIUM 40 MG: 40 TABLET, FILM COATED ORAL at 08:05

## 2024-05-30 RX ADMIN — HEPARIN SODIUM 5000 UNITS: 5000 INJECTION INTRAVENOUS; SUBCUTANEOUS at 06:05

## 2024-05-30 RX ADMIN — PSYLLIUM HUSK 1 PACKET: 3.4 POWDER ORAL at 09:05

## 2024-05-30 RX ADMIN — HEPARIN SODIUM 5000 UNITS: 5000 INJECTION INTRAVENOUS; SUBCUTANEOUS at 02:05

## 2024-05-30 RX ADMIN — ASPIRIN 81 MG CHEWABLE TABLET 81 MG: 81 TABLET CHEWABLE at 08:05

## 2024-05-30 RX ADMIN — SEVELAMER CARBONATE 0.8 G: 800 POWDER, FOR SUSPENSION ORAL at 05:05

## 2024-05-30 NOTE — PT/OT/SLP PROGRESS
"Occupational Therapy   Treatment    Name: Sarah Saravia  MRN: [id number]  Admitting Diagnosis:  Acute cystitis with hematuria     Other staff present : Molly Godwin : OT  Recommendations:     Discharge Recommendations: Moderate Intensity Therapy  Discharge Equipment Recommendations:  hospital bed, lift device, wheelchair  Barriers to discharge:  Other (Comment) (requires significant assist)    Assessment:     Sarah Saravia is a 53 y.o. female with a medical diagnosis of Acute cystitis with hematuria.  She presents with deficits in cognition, aphasia, impaired mobility and self-care. Pt. Tolerated session very well today with good participation noted with engagement in familiar tasks. Pt. Does continue to require significant assist for all mobility. Patient would benefit from continued OT services to maximize level of safety and independence with self-care tasks.   . Performance deficits affecting function are weakness, impaired endurance, impaired self care skills, impaired functional mobility, impaired cognition, decreased lower extremity function, impaired cardiopulmonary response to activity, decreased upper extremity function, impaired balance, decreased coordination, impaired coordination.     Rehab Prognosis:  Good; patient would benefit from acute skilled OT services to address these deficits and reach maximum level of function.       Plan:     Patient to be seen 3 x/week to address the above listed problems via self-care/home management, therapeutic activities, therapeutic exercises, neuromuscular re-education  Plan of Care Expires: 06/13/24  Plan of Care Reviewed with: patient    Subjective     Chief Complaint: Pt. With only one verbalization on this date saying "yeah" when asked after returned supine if that scared her.   Patient/Family Comments/goals: No goals stated  Pain/Comfort:  Pain Rating 1: 0/10  Pain Rating Post-Intervention 1: 0/10    Objective:     Communicated with: nurse prior to session.  " Patient found supine with PEG Tube, telemetry upon OT entry to room.    General Precautions: Standard, aspiration, fall, NPO, aphasia    Orthopedic Precautions:N/A  Braces: N/A  Respiratory Status: Room air     Occupational Performance:     Bed Mobility:    Patient completed Scooting/Bridging with total assistance and 2 persons  Patient completed Supine to Sit with maximal assistance and 2 persons  Patient completed Sit to Supine with total assistance and 2 persons     Functional Mobility/Transfers:  Pt. Required Total A x 2 to scoot x 3 times along EOB to HOB. Drawsheet to HOB x 2   Functional Mobility: Not tested    Activities of Daily Living:  Grooming: minimum assistance to initiate task of washing face seated EOB, applied lip balm and applied lotion to hands as well as thigh region  Upper Body Dressing: moderate assistance seated EOB to don fresh gown forward when seated EOB      [facility] 6 Click ADL: 13    Treatment & Education:  Pt. Sat EOB with CGA/Min A on this date and engaged in familiar activities such as:   Wiping table top with use of RUE ( CGA)  Folding a large towel following demonstration (Min A)  Putting a pillow into a pillow case (Min A)    Patient left supine with all lines intact and call button in reach    GOALS:   Multidisciplinary Problems       Occupational Therapy Goals          Problem: Occupational Therapy    Goal Priority Disciplines Outcome Interventions   Occupational Therapy Goal     OT, PT/OT Progressing    Description: Goals to be met by: 5/22/24 Goals revised as needed on 05-30 to be met by 06-19:    Patient will increase functional independence with ADLs by performing:    UE Dressing with Maximum Assistance.MET 05-30  Revised: UBD with Min A  Grooming while EOB with Maximum Assistance.Met 05-30  Revised: Grooming seated EOB with CGA  Sitting at edge of bed x5 minutes with Maximum Assistance. - Met 5/22  Revised: Sit EOB x 20 minutes with SBA  Rolling to Bilateral with Moderate  Assistance.NOT MET     Supine to sit with Maximum Assistance. NOT MET                           Time Tracking:     OT Date of Treatment: 05/30/24  OT Start Time: 1302  OT Stop Time: 1343  OT Total Time (min): 41 min    Billable Minutes:Self Care/Home Management 18  Therapeutic Activity 23    OT/JOSÉ: OT     Number of JOSÉ visits since last OT visit: 1    5/30/2024

## 2024-05-30 NOTE — PROGRESS NOTES
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 History was given from patient's daughter. She was undergoing dialysis today and noticed she was lethargic, less alert than usual self. Pt completed dialysis and still not acting herself. EMS was called, fever of a 100.  On chart review, she did have an episode of large volume emesis around 1700.  Per EMS, she had a slightly elevated temp 100.0°F, glucose 300s.     In the ED: UA 2+ leuks, >100 WBC, many bacteria, WBC 17, CT abd/pelvis concerning for cystitis. Given vanc/zosyn    Overview/Hospital Course:  Patient was admitted to Hospital Medicine service for medical management and evaluation of urosepsis. Patient was continued on vanc/zosyn. Vascular Neurology consulted concerning mental status change with the recommendation to initiate ASA 81 QD and to obtain an MRI to r/o new stroke. Imaging pending. Nephrology was consulted for regularly scheduled dialysis. Afternoon of 4/12, patient was unable to tolerate filtration of volume during dialsis 2/2 hypotension. Patient received 500cc bolus and was transferred back to floor after finishing the session without volume removed. Will monitor for signs of volume overload. Tailoring insulin regimen while uptitrating tube feeds to goal rate. Staph Epi in all 4 bottles; ID with recommendation to continue Vanc & de-escalate meropenem to ertapenem on 04/15 through 4/16. Patient completed IV course of UTI coverage. Patient tolerated volume removal well in dialysis  throughout the rest of her hospital stay. Pending discharge to LTACH pending bed availability. Patient without BM with one episode of vomiting overnight 4/17. KUB with bowel gas and low concern for obstruction with no transition point noted. Escalating bowel regimen. Pending placement as of 4/22. Pulm consult placed to evaluate for possible trach downsize & eventual decannulation to assist placement if safe. Trach capping trial per pulm for 48h and will assess for decannulation on Monday. DVT studies negative. Pulm successfully decannulated pt 4/30, IR exchanged TDC. Pending swallow study with SLP and waiting for dispo options. Pt failed swallow study, placement pending. Working with PT on mobilize pt to sitting in a chair to expand placement options. Pt successfully mobilized using sandee lift but required 2 people to do so. Discussing dispo plan with family, likely d/c home if HD, DME needs able to be met. Pending acceptance at outpatient HD center. Ongoing placement difficulties d/t need for accepting HD facility to have sandee lift with 2 personnel to operate. Family meeting to discuss disposition options planned for Thursday 5/23 at 1 PM. Per family meeting referral sent to [facility], pending placement    Interval History: NAEON pending placement      Objective:     Vital Signs (Most Recent):  Temp: 98.8 °F (37.1 °C) (05/30/24 1136)  Pulse: 95 (05/30/24 1136)  Resp: 20 (05/30/24 1136)  BP: 125/83 (05/30/24 1136)  SpO2: 99 % (05/30/24 1136) Vital Signs (24h Range):  Temp:  [98.4 °F (36.9 °C)-99.6 °F (37.6 °C)] 98.8 °F (37.1 °C)  Pulse:  [] 95  Resp:  [17-20] 20  SpO2:  [97 %-100 %] 99 %  BP: (111-139)/(66-88) 125/83     Weight: 105.5 kg (232 lb 9.4 oz)  Body mass index is 36.43 kg/m².    Intake/Output Summary (Last 24 hours) at 5/30/2024 1247  Last data filed at 5/29/2024 1821  Gross per 24 hour   Intake 400 ml   Output 0 ml   Net 400 ml         Physical Exam  Vitals and nursing note reviewed.    Constitutional:       General: She is not in acute distress.     Appearance: She is not toxic-appearing or diaphoretic.   HENT:      Head: Normocephalic and atraumatic.   Eyes:      General:         Right eye: No discharge.         Left eye: No discharge.      Conjunctiva/sclera: Conjunctivae normal.   Neck:      Comments: Decannulated  Cardiovascular:      Rate and Rhythm: Normal rate and regular rhythm.      Heart sounds: Normal heart sounds.   Pulmonary:      Effort: Pulmonary effort is normal. No respiratory distress.   Abdominal:      General: Abdomen is flat. There is no distension.      Tenderness: There is no abdominal tenderness.      Comments: PEG tube in place with new skin breakdown and retraction   Musculoskeletal:      Right lower leg: No edema.      Left lower leg: No edema.   Skin:     General: Skin is warm and dry.   Neurological:      General: No focal deficit present.      Mental Status: She is alert.      Comments: Nonverbal             Significant Labs: All pertinent labs within the past 24 hours have been reviewed.    Significant Imaging: I have reviewed all pertinent imaging results/findings within the past 24 hours.    Assessment/Plan:      * Acute cystitis with hematuria  resolved    Pt presenting with AMS and worsening lethargy. Pt is non-verbal at baseline, does not follow commands, but was less responsive than normal. Pt found to have a fever. Urinary source suspected based off of urine and CT abd/pelvis. Pt has many prior resistant bacterial infections including urinary sources. Has prior with sensitivity to zosyn and has also improved off ED dose of vanc/zosyn. Lactic elevated a 3.8->3.49 prior to completion of fluid bolus 500ml.    Patient with new fever and tachycardia on 4/25. Low threshold to initiate additional infectious workup and repeat UA.     Plan  S/p course of vanc/ertapenem per ID. Course completed 4/16.  Daily cbc  Contact precautions    *on contact precautions  indefinitely while patient still makes urine given pt incontinent per infection control    Vulvovaginal candidiasis  Noted 5/29, given 150mg fluconazole x1      Irritant contact dermatitis due friction or contact with other specified body fluids  Wound care      Debility  Needs:  Wheelchair: patient has a mobility limitation that significantly impairs her ability to participate in one or more mobility related activities of daily living (MRADL's) such as toileting, feeding, dressing, grooming, and bathing in customary locations in the home.  The mobility limitation cannot be sufficiently resolved by the use of a cane or walker.   The use of a manual wheelchair will significantly improve the patient's ability to participate in MRADLS and the patient will use it on regular basis in the home.  Family/pt has expressed her willingness to use a manual wheelchair in the home.  She also has a caregiver who is capable of assisting in mobility.    Mrs Saravia requires a hospital bed due to her requiring positioning of the body in ways not feasible with an ordinary bed to alleviate pain/ is completely immobile /or limited mobility and cannot independently make changes in body position without the use of the bed.  The positioning of the body cannot be sufficiently resolved by the use of pillows and wedges    Patient has a mobility limitation that significantly impairs their ability to participate in one or more mobility related activities of daily living, including toileting. This deficit can be resolved by using a bedside commode. Patient demonstrates mobility limitations that will cause them to be confined to one room at home without bathroom access for up to 30 days. Using a bedside commode will greatly improve the patient's ability to participate in MRADLs       Complication of vascular dialysis catheter  Cath intermittently clogging, not resolving with cath-hedy, going for IR venogram 4/30 with possible exchange. S/p exchange  with IR on 4/30      Constipation  RESOLVED with Bowel regimen  Patient without BM x5d. One episode of vomitus night of 4/17. Some concern for bowel obstruction. Tolerating continuous tube feeds at goal rate with 0cc on residuals. Physical exam with bowel sounds, soft nontender abdomen. KUB without concern for obstruction with bowel gas, lack of transition point.    Patient now with regular bowel movements on bowel regimen.     Plan  - Miralax BID, Senna BID  - compazine PRN    Moderate malnutrition  Nutrition consulted. Most recent weight and BMI monitored-     Measurements:  Wt Readings from Last 1 Encounters:   05/29/24 105.5 kg (232 lb 9.4 oz)   Body mass index is 36.43 kg/m².    Patient has been screened and assessed by RD.    Malnutrition Type:  Context: acute illness or injury  Level: moderate    Malnutrition Characteristic Summary:  Weight Loss (Malnutrition): 10% in 6 months  Subcutaneous Fat (Malnutrition): mild depletion  Muscle Mass (Malnutrition): mild depletion  Fluid Accumulation (Malnutrition): mild    Interventions/Recommendations (treatment strategy):  1.    -- TF  -- going for swallow study with SLP to assess possibility of pleasure oral feedings --> failed repeatedly    Prolonged QT interval  Qtc 526 [04/10/24]      limit Qtc prolonging drugs as able    Spastic hemiplegia of right dominant side as late effect of cerebrovascular disease  Important that patient is able to sit in chair for 4h for dialysis placement needs, even if she requires lift/assistance getting into the chair     This patient has Chronic right hemiplegia due to stroke. Physical therapy services has been scheduled. Continue all standard measures for pressure injury prevention and consult wound care for any wounds (chronic or acute).    ESRD (end stage renal disease) on dialysis  Creatine stable for now. BMP reviewed- noted Estimated Creatinine Clearance: 15.1 mL/min (A) (based on SCr of 5.4 mg/dL (H)). according to latest data.  Based on current GFR, CKD stage is end stage.  Monitor UOP and serial BMP and adjust therapy as needed. Renally dose meds. Avoid nephrotoxic medications and procedures.    -- HD MWF  -- nephro following    Status post tracheostomy  Resolved, decannulated 4/30    Acute encephalopathy  Pt is non-verbal and does not follow commands at baseline. Vascular Neurology consulted given acute change from baseline. MRI with concern for evolution of prior strokes with noted differential of T2 hyperintensities including vasculitis vs demyelination. Discussed with Vascular Neurology; low concern for ongoing vasculitis/demyelination, likely represents typical evolution of prior infarcts.    Patient at baseline as of 4/22.     Plan  RESOLVED  - Repeat head imaging if concern of new change in mental status/focal deficit    Type 2 diabetes mellitus with hyperglycemia, with long-term current use of insulin  Adjusting insulin to account for diabetic TF    Patient's FSGs are controlled on current medication regimen.  Last A1c reviewed-   Lab Results   Component Value Date    HGBA1C 6.2 (H) 05/27/2024     Most recent fingerstick glucose reviewed-   Recent Labs   Lab 05/29/24  1602 05/29/24  2108 05/30/24  1136   POCTGLUCOSE 208* 164* 198*       Current correctional scale  Medium  Maintain anti-hyperglycemic dose as follows-   Antihyperglycemics (From admission, onward)      Start     Stop Route Frequency Ordered    05/27/24 1633  insulin aspart U-100 pen 0-5 Units         -- SubQ Before meals & nightly PRN 05/27/24 1533    05/25/24 2100  insulin glargine U-100 (Lantus) pen 10 Units         -- SubQ Nightly 05/25/24 1035          Hold Oral hypoglycemics while patient is in the hospital.  POCT glucose q6h    Hyponatremia  Patient has hyponatremia which is uncontrolled,We will aim to correct the sodium by 4-6mEq in 24 hours. We will monitor sodium Daily. The hyponatremia is due to ESRD. Discussed with nephrology, dialysate bath adjusted to  "account for and correct hyponatremia.  No results for input(s): "NA" in the last 24 hours.  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 Pt underwent extensive course, currently still healing from this, but no longer has wound vac. Pt's wound currently covered with bandage. Picture in media tabs    Plan  Wound care        VTE Risk Mitigation (From admission, onward)           Ordered     heparin (porcine) injection 5,000 Units  Every 8 hours         05/29/24 0823     heparin (porcine) injection 1,000 Units  As needed (PRN)         05/21/24 1017     heparin (porcine) injection 3,000 Units  As needed (PRN)         04/17/24 0825                    Discharge Planning   ZEKE: 6/7/2024     Code Status: Full Code   Is the patient medically ready for discharge?: No    Reason for patient still in hospital (select all that apply): Patient trending condition  Discharge Plan A: Skilled Nursing Facility                  Osito Dos Santos MD  Department of Hospital Medicine   [facility] - Telemetry Stepdown    "

## 2024-05-30 NOTE — ASSESSMENT & PLAN NOTE
53 y.o. Black or  Female ESRD-HD M-W-F at [facility] presents to ED on 4/10/2024 with UTI.    Of note, the patient was initiated on RRT in February 2024 after being admitted with ALVARADO 2/2 iATN due to septic shock. Perm cath placed on 2/29/24. She was transferred to [facility] on 3/12. Deemed ESRD at [facility].  Nephrology consulted for inpatient ESRD-HD management    Assessment:   - next dialysis session will be tomorrow 5/31  - Continue MWF iHD schedule while IP.   - Hyponatremic in setting of ESRD - consider decreasing FWF   - Continue to monitor intake and output  - Please avoid gadolinium, fleets, phos-based laxatives, NSAIDs  - Dialysis thrice weekly unless more urgent indications arise. Will evaluate RRT requirements Daily.    Anemia of ESRD hgb 9.7 c/w EPO with HD  Recent Labs   Lab 05/27/24  1050   WBC 11.29   HGB 9.3*   HCT 30.0*          Lab Results   Component Value Date    FESATURATED 10 (L) 03/15/2024    FERRITIN 414 (H) 03/15/2024       - Goal in ESRD is Hgb of 10-11. Continue EPO.      Secondary hyperparathyroid of renal origin   - continue sevelamer

## 2024-05-30 NOTE — PT/OT/SLP PROGRESS
Speech Language Pathology Treatment    Patient Name:  Sarah Saravia   MRN:  [id number]  Admitting Diagnosis: Acute cystitis with hematuria    Recommendations:                 General Recommendations:  Dysphagia therapy and Speech/language therapy  Diet recommendations:  NPO, Liquid Diet Level: NPO  Pt may receive thin water for comfort/pleasure.  Aspiration Precautions: Continue alternate means of nutrition, HOB to 90 degrees, Monitor for s/s of aspiration, and Strict aspiration precautions   General Precautions: Standard, aphasia, aspiration, fall, NPO  Communication strategies:  yes/no questions only and go to room if call light pushed         Assessment:     Sarah Saravia is a 53 y.o. female with an SLP diagnosis of Aphasia, Dysphagia, and Cognitive-Linguistic Impairment.     Subjective     Pt was nonverbal, but vocalized when spontaneously laughed.    Pain/Comfort:  Pain Rating 1: 0/10    Respiratory Status: Room air    Objective:     Has the patient been evaluated by SLP for swallowing?   Yes  Keep patient NPO? Yes   Current Respiratory Status:        Pt seen for ongoing swallowing assessment/indirect dysphagia therapy. No visitors present. Pt awake/alert and remained nonverbal. Pt accepted a straw sip of water x 1 and straw sips of apple juice x 9 (approx 2oz consumed).  Pt  exhibited holding and piecemeal deglutition for final 2 straw sips of apple juice.  Cues needed to ensure pt swallowed to clear oral cavity.  No overt s/s of aspiration observed. Pt remains at risk for aspiration 2/2 holding. SLP continues to recommend that pt only receive sips of thin water for comfort. Cont POC.     Goals:   Multidisciplinary Problems       SLP Goals          Problem: SLP    Goal Priority Disciplines Outcome   SLP Goal     SLP    Description: Speech Language Pathology Goals  Updated goals expected to be met by 5/10 (goals remain appropriate 5/27 - reassess on 6/3:  1. Pt will participate in ongoing swallowing  assessment to determine if appropriate for PO trials for pleasure.   2. Pt will model single step command x 1 given max cues.   3. Pt will answer simple yes/no q's during a structured receptive language task x 1 given max cues.   4. Pt will phonate purposefully upon command x 1 given max cues.   5. Pt will attempt to vocalize/verbalize during automatic speech task x 1 given max cues.   6. Pt will participate in evaluation of ability to utilize basic communication board.     Goals expected to be met by 5/8:  1. Pt will participate in Modified Barium Swallow Study to determine if safe for oral intake. Goal met/attempted 5/2                               Plan:     Patient to be seen:  3 x/week   Plan of Care expires:  05/31/24  Plan of Care reviewed with:  patient, daughter   SLP Follow-Up:  Yes       Discharge recommendations:  Moderate Intensity Therapy     Time Tracking:     SLP Treatment Date:   05/30/24  Speech Start Time:  0956  Speech Stop Time:  1008     Speech Total Time (min):  12 min    Billable Minutes: Treatment Swallowing Dysfunction 12    05/30/2024

## 2024-05-30 NOTE — NURSING
.Nurses Note -- 4 Eyes      5/29/2024   7:58 PM      Skin assessed during: Q Shift Change      [] No Altered Skin Integrity Present    []Prevention Measures Documented      [x] Yes- Altered Skin Integrity Present or Discovered   [] LDA Added if Not in Epic (Describe Wound)   [] New Altered Skin Integrity was Present on Admit and Documented in LDA   [] Wound Image Taken    Wound Care Consulted? No    Attending Nurse:  Pk Eastman RN/Staff Member:  Beth

## 2024-05-30 NOTE — PLAN OF CARE
Problem: Infection  Goal: Absence of Infection Signs and Symptoms  Outcome: Progressing     Problem: Skin Injury Risk Increased  Goal: Skin Health and Integrity  Outcome: Progressing     Problem: Adult Inpatient Plan of Care  Goal: Plan of Care Review  Outcome: Progressing  Goal: Patient-Specific Goal (Individualized)  Outcome: Progressing  Goal: Absence of Hospital-Acquired Illness or Injury  Outcome: Progressing  Goal: Optimal Comfort and Wellbeing  Outcome: Progressing  Goal: Readiness for Transition of Care  Outcome: Progressing     Problem: Bariatric Environmental Safety  Goal: Safety Maintained with Care  Outcome: Progressing     Problem: Hemodynamic Instability (Hemodialysis)  Goal: Effective Tissue Perfusion  Outcome: Progressing     Problem: Infection (Hemodialysis)  Goal: Absence of Infection Signs and Symptoms  Outcome: Progressing

## 2024-05-30 NOTE — PLAN OF CARE
Problem: Infection  Goal: Absence of Infection Signs and Symptoms  Outcome: Progressing     Problem: Skin Injury Risk Increased  Goal: Skin Health and Integrity  Outcome: Progressing     Problem: Adult Inpatient Plan of Care  Goal: Plan of Care Review  Outcome: Progressing  Goal: Patient-Specific Goal (Individualized)  Outcome: Progressing  Goal: Absence of Hospital-Acquired Illness or Injury  Outcome: Progressing  Goal: Optimal Comfort and Wellbeing  Outcome: Progressing  Goal: Readiness for Transition of Care  Outcome: Progressing     Problem: Bariatric Environmental Safety  Goal: Safety Maintained with Care  Outcome: Progressing   HD due tomorrow.

## 2024-05-30 NOTE — SUBJECTIVE & OBJECTIVE
Interval History: HD yesterday, tolerated well. Pending placement.    Review of patient's allergies indicates:  No Known Allergies  Current Facility-Administered Medications   Medication Frequency    acetaminophen oral solution 650 mg Q6H PRN    ascorbic acid (vitamin C) tablet 500 mg BID    aspirin chewable tablet 81 mg Daily    atorvastatin tablet 40 mg Daily    dextrose 10 % infusion Continuous PRN    dextrose 10% bolus 125 mL 125 mL PRN    dextrose 10% bolus 250 mL 250 mL PRN    epoetin merry injection 6,100 Units Every Mon, Wed, Fri    glucagon (human recombinant) injection 1 mg PRN    glucose chewable tablet 16 g PRN    glucose chewable tablet 24 g PRN    heparin (porcine) injection 1,000 Units PRN    heparin (porcine) injection 3,000 Units PRN    heparin (porcine) injection 5,000 Units Q8H    hepatitis B virus vacc.rec(PF) injection 20 mcg vaccine x 1 dose    insulin aspart U-100 pen 0-5 Units QID (AC + HS) PRN    insulin glargine U-100 (Lantus) pen 10 Units QHS    metoprolol tartrate (LOPRESSOR) tablet 25 mg BID    ondansetron 4 mg/5 mL solution 4 mg Q6H PRN    pantoprazole suspension 40 mg Daily    psyllium husk (aspartame) 3.4 gram packet 1 packet Daily    sevelamer carbonate pwpk 0.8 g TID WM    sodium chloride 0.9% bolus 250 mL 250 mL PRN    sodium chloride 0.9% flush 10 mL Q12H PRN    zinc sulfate capsule 220 mg Daily       Objective:     Vital Signs (Most Recent):  Temp: 99.1 °F (37.3 °C) (05/30/24 0746)  Pulse: 101 (05/30/24 0812)  Resp: 19 (05/30/24 0746)  BP: 123/70 (05/30/24 0812)  SpO2: 99 % (05/30/24 0746) Vital Signs (24h Range):  Temp:  [98 °F (36.7 °C)-99.6 °F (37.6 °C)] 99.1 °F (37.3 °C)  Pulse:  [] 101  Resp:  [17-19] 19  SpO2:  [97 %-100 %] 99 %  BP: (111-144)/(66-88) 123/70     Weight: 105.5 kg (232 lb 9.4 oz) (05/29/24 1059)  Body mass index is 36.43 kg/m².  Body surface area is 2.23 meters squared.    I/O last 3 completed shifts:  In: 900 [I.V.:300; NG/GT:600]  Out: 3150  [Other:3150]     Physical Exam  Vitals and nursing note reviewed.   Constitutional:       General: She is not in acute distress.     Appearance: She is not toxic-appearing or diaphoretic.   HENT:      Head: Normocephalic and atraumatic.   Eyes:      General:         Right eye: No discharge.         Left eye: No discharge.      Conjunctiva/sclera: Conjunctivae normal.   Neck:      Comments: Decannulated  Cardiovascular:      Rate and Rhythm: Normal rate and regular rhythm.      Heart sounds: Normal heart sounds.   Pulmonary:      Effort: Pulmonary effort is normal. No respiratory distress.   Abdominal:      General: Abdomen is flat. There is no distension.      Tenderness: There is no abdominal tenderness.      Comments: PEG tube in place with new skin breakdown and retraction   Musculoskeletal:      Right lower leg: No edema.      Left lower leg: No edema.   Skin:     General: Skin is warm and dry.   Neurological:      General: No focal deficit present.      Mental Status: She is alert.      Comments: Nonverbal          Significant Labs:  CBC:   Recent Labs   Lab 05/27/24  1050   WBC 11.29   RBC 3.58*   HGB 9.3*   HCT 30.0*      MCV 84   MCH 26.0*   MCHC 31.0*     CMP:   Recent Labs   Lab 05/29/24  0554 05/29/24  0909 05/29/24  1133   *  --   --    CALCIUM 9.9  --   --    ALBUMIN 3.1*  --   --    *  --   --    K 4.3  --   --    CO2 24  --   --    CL 92*  --   --    BUN 46*   < > 12   CREATININE 5.4*  --   --     < > = values in this interval not displayed.     All labs within the past 24 hours have been reviewed.

## 2024-05-30 NOTE — PROGRESS NOTES
[facility] - Telemetry Stepdown  Nephrology  Progress Note    Patient Name: Sarah Saravia  MRN: [id number]  Admission Date: 4/10/2024  Hospital Length of Stay: 50 days  Attending Provider: Parviz Pena MD   Primary Care Physician: Deanna, Primary Doctor  Principal Problem:Acute cystitis with hematuria    Subjective:     Interval History: HD yesterday, tolerated well. Pending placement.    Review of patient's allergies indicates:  No Known Allergies  Current Facility-Administered Medications   Medication Frequency    acetaminophen oral solution 650 mg Q6H PRN    ascorbic acid (vitamin C) tablet 500 mg BID    aspirin chewable tablet 81 mg Daily    atorvastatin tablet 40 mg Daily    dextrose 10 % infusion Continuous PRN    dextrose 10% bolus 125 mL 125 mL PRN    dextrose 10% bolus 250 mL 250 mL PRN    epoetin merry injection 6,100 Units Every Mon, Wed, Fri    glucagon (human recombinant) injection 1 mg PRN    glucose chewable tablet 16 g PRN    glucose chewable tablet 24 g PRN    heparin (porcine) injection 1,000 Units PRN    heparin (porcine) injection 3,000 Units PRN    heparin (porcine) injection 5,000 Units Q8H    hepatitis B virus vacc.rec(PF) injection 20 mcg vaccine x 1 dose    insulin aspart U-100 pen 0-5 Units QID (AC + HS) PRN    insulin glargine U-100 (Lantus) pen 10 Units QHS    metoprolol tartrate (LOPRESSOR) tablet 25 mg BID    ondansetron 4 mg/5 mL solution 4 mg Q6H PRN    pantoprazole suspension 40 mg Daily    psyllium husk (aspartame) 3.4 gram packet 1 packet Daily    sevelamer carbonate pwpk 0.8 g TID WM    sodium chloride 0.9% bolus 250 mL 250 mL PRN    sodium chloride 0.9% flush 10 mL Q12H PRN    zinc sulfate capsule 220 mg Daily       Objective:     Vital Signs (Most Recent):  Temp: 99.1 °F (37.3 °C) (05/30/24 0746)  Pulse: 101 (05/30/24 0812)  Resp: 19 (05/30/24 0746)  BP: 123/70 (05/30/24 0812)  SpO2: 99 % (05/30/24 0746) Vital Signs (24h Range):  Temp:  [98 °F (36.7 °C)-99.6 °F (37.6 °C)] 99.1  °F (37.3 °C)  Pulse:  [] 101  Resp:  [17-19] 19  SpO2:  [97 %-100 %] 99 %  BP: (111-144)/(66-88) 123/70     Weight: 105.5 kg (232 lb 9.4 oz) (05/29/24 1059)  Body mass index is 36.43 kg/m².  Body surface area is 2.23 meters squared.    I/O last 3 completed shifts:  In: 900 [I.V.:300; NG/GT:600]  Out: 3150 [Other:3150]     Physical Exam  Vitals and nursing note reviewed.   Constitutional:       General: She is not in acute distress.     Appearance: She is not toxic-appearing or diaphoretic.   HENT:      Head: Normocephalic and atraumatic.   Eyes:      General:         Right eye: No discharge.         Left eye: No discharge.      Conjunctiva/sclera: Conjunctivae normal.   Neck:      Comments: Decannulated  Cardiovascular:      Rate and Rhythm: Normal rate and regular rhythm.      Heart sounds: Normal heart sounds.   Pulmonary:      Effort: Pulmonary effort is normal. No respiratory distress.   Abdominal:      General: Abdomen is flat. There is no distension.      Tenderness: There is no abdominal tenderness.      Comments: PEG tube in place with new skin breakdown and retraction   Musculoskeletal:      Right lower leg: No edema.      Left lower leg: No edema.   Skin:     General: Skin is warm and dry.   Neurological:      General: No focal deficit present.      Mental Status: She is alert.      Comments: Nonverbal          Significant Labs:  CBC:   Recent Labs   Lab 05/27/24  1050   WBC 11.29   RBC 3.58*   HGB 9.3*   HCT 30.0*      MCV 84   MCH 26.0*   MCHC 31.0*     CMP:   Recent Labs   Lab 05/29/24  0554 05/29/24  0909 05/29/24  1133   *  --   --    CALCIUM 9.9  --   --    ALBUMIN 3.1*  --   --    *  --   --    K 4.3  --   --    CO2 24  --   --    CL 92*  --   --    BUN 46*   < > 12   CREATININE 5.4*  --   --     < > = values in this interval not displayed.     All labs within the past 24 hours have been reviewed.       Assessment/Plan:     Renal/  * Acute cystitis with hematuria  - defer  to primary     ESRD (end stage renal disease) on dialysis  53 y.o. Black or  Female ESRD-HD M-W-F at [facility] presents to ED on 4/10/2024 with UTI.    Of note, the patient was initiated on RRT in February 2024 after being admitted with ALVARADO 2/2 iATN due to septic shock. Perm cath placed on 2/29/24. She was transferred to [facility] on 3/12. Deemed ESRD at [facility].  Nephrology consulted for inpatient ESRD-HD management    Assessment:   - next dialysis session will be tomorrow 5/31  - Continue MWF iHD schedule while IP.   - Hyponatremic in setting of ESRD - consider decreasing FWF   - Continue to monitor intake and output  - Please avoid gadolinium, fleets, phos-based laxatives, NSAIDs  - Dialysis thrice weekly unless more urgent indications arise. Will evaluate RRT requirements Daily.    Anemia of ESRD hgb 9.7 c/w EPO with HD  Recent Labs   Lab 05/27/24  1050   WBC 11.29   HGB 9.3*   HCT 30.0*          Lab Results   Component Value Date    FESATURATED 10 (L) 03/15/2024    FERRITIN 414 (H) 03/15/2024       - Goal in ESRD is Hgb of 10-11. Continue EPO.      Secondary hyperparathyroid of renal origin   - continue sevelamer           Thank you for your consult. I will follow-up with patient. Please contact us if you have any additional questions.    Delfina Shi, POONAM  Nephrology  [facility] - Telemetry Stepdown

## 2024-05-30 NOTE — SUBJECTIVE & OBJECTIVE
Interval History: NAEON pending placement      Objective:     Vital Signs (Most Recent):  Temp: 98.8 °F (37.1 °C) (05/30/24 1136)  Pulse: 95 (05/30/24 1136)  Resp: 20 (05/30/24 1136)  BP: 125/83 (05/30/24 1136)  SpO2: 99 % (05/30/24 1136) Vital Signs (24h Range):  Temp:  [98.4 °F (36.9 °C)-99.6 °F (37.6 °C)] 98.8 °F (37.1 °C)  Pulse:  [] 95  Resp:  [17-20] 20  SpO2:  [97 %-100 %] 99 %  BP: (111-139)/(66-88) 125/83     Weight: 105.5 kg (232 lb 9.4 oz)  Body mass index is 36.43 kg/m².    Intake/Output Summary (Last 24 hours) at 5/30/2024 1247  Last data filed at 5/29/2024 1821  Gross per 24 hour   Intake 400 ml   Output 0 ml   Net 400 ml         Physical Exam  Vitals and nursing note reviewed.   Constitutional:       General: She is not in acute distress.     Appearance: She is not toxic-appearing or diaphoretic.   HENT:      Head: Normocephalic and atraumatic.   Eyes:      General:         Right eye: No discharge.         Left eye: No discharge.      Conjunctiva/sclera: Conjunctivae normal.   Neck:      Comments: Decannulated  Cardiovascular:      Rate and Rhythm: Normal rate and regular rhythm.      Heart sounds: Normal heart sounds.   Pulmonary:      Effort: Pulmonary effort is normal. No respiratory distress.   Abdominal:      General: Abdomen is flat. There is no distension.      Tenderness: There is no abdominal tenderness.      Comments: PEG tube in place with new skin breakdown and retraction   Musculoskeletal:      Right lower leg: No edema.      Left lower leg: No edema.   Skin:     General: Skin is warm and dry.   Neurological:      General: No focal deficit present.      Mental Status: She is alert.      Comments: Nonverbal             Significant Labs: All pertinent labs within the past 24 hours have been reviewed.    Significant Imaging: I have reviewed all pertinent imaging results/findings within the past 24 hours.

## 2024-05-30 NOTE — ASSESSMENT & PLAN NOTE
Adjusting insulin to account for diabetic TF    Patient's FSGs are controlled on current medication regimen.  Last A1c reviewed-   Lab Results   Component Value Date    HGBA1C 6.2 (H) 05/27/2024     Most recent fingerstick glucose reviewed-   Recent Labs   Lab 05/29/24  1602 05/29/24  2108 05/30/24  1136   POCTGLUCOSE 208* 164* 198*       Current correctional scale  Medium  Maintain anti-hyperglycemic dose as follows-   Antihyperglycemics (From admission, onward)    Start     Stop Route Frequency Ordered    05/27/24 1633  insulin aspart U-100 pen 0-5 Units         -- SubQ Before meals & nightly PRN 05/27/24 1533    05/25/24 2100  insulin glargine U-100 (Lantus) pen 10 Units         -- SubQ Nightly 05/25/24 1035        Hold Oral hypoglycemics while patient is in the hospital.  POCT glucose q6h

## 2024-05-31 LAB
ALBUMIN SERPL BCP-MCNC: 3.2 G/DL (ref 3.5–5.2)
ANION GAP SERPL CALC-SCNC: 13 MMOL/L (ref 8–16)
BUN SERPL-MCNC: 40 MG/DL (ref 6–20)
CALCIUM SERPL-MCNC: 10.5 MG/DL (ref 8.7–10.5)
CHLORIDE SERPL-SCNC: 95 MMOL/L (ref 95–110)
CO2 SERPL-SCNC: 22 MMOL/L (ref 23–29)
CREAT SERPL-MCNC: 5.4 MG/DL (ref 0.5–1.4)
EST. GFR  (NO RACE VARIABLE): 8.9 ML/MIN/1.73 M^2
GLUCOSE SERPL-MCNC: 166 MG/DL (ref 70–110)
PHOSPHATE SERPL-MCNC: 3.9 MG/DL (ref 2.7–4.5)
POCT GLUCOSE: 166 MG/DL (ref 70–110)
POCT GLUCOSE: 169 MG/DL (ref 70–110)
POCT GLUCOSE: 181 MG/DL (ref 70–110)
POTASSIUM SERPL-SCNC: 4.4 MMOL/L (ref 3.5–5.1)
SODIUM SERPL-SCNC: 130 MMOL/L (ref 136–145)

## 2024-05-31 PROCEDURE — 20600001 HC STEP DOWN PRIVATE ROOM

## 2024-05-31 PROCEDURE — 97110 THERAPEUTIC EXERCISES: CPT

## 2024-05-31 PROCEDURE — 80069 RENAL FUNCTION PANEL: CPT

## 2024-05-31 PROCEDURE — 25000003 PHARM REV CODE 250

## 2024-05-31 PROCEDURE — 63600175 PHARM REV CODE 636 W HCPCS: Performed by: NURSE PRACTITIONER

## 2024-05-31 PROCEDURE — 80100016 HC MAINTENANCE HEMODIALYSIS

## 2024-05-31 PROCEDURE — 63600175 PHARM REV CODE 636 W HCPCS

## 2024-05-31 PROCEDURE — 97535 SELF CARE MNGMENT TRAINING: CPT

## 2024-05-31 PROCEDURE — 97530 THERAPEUTIC ACTIVITIES: CPT

## 2024-05-31 PROCEDURE — 90935 HEMODIALYSIS ONE EVALUATION: CPT | Mod: ,,, | Performed by: NURSE PRACTITIONER

## 2024-05-31 PROCEDURE — 25000003 PHARM REV CODE 250: Performed by: NURSE PRACTITIONER

## 2024-05-31 PROCEDURE — 80100014 HC HEMODIALYSIS 1:1

## 2024-05-31 PROCEDURE — 27000207 HC ISOLATION

## 2024-05-31 PROCEDURE — 36415 COLL VENOUS BLD VENIPUNCTURE: CPT

## 2024-05-31 PROCEDURE — 63600175 PHARM REV CODE 636 W HCPCS: Performed by: INTERNAL MEDICINE

## 2024-05-31 PROCEDURE — 97112 NEUROMUSCULAR REEDUCATION: CPT

## 2024-05-31 RX ADMIN — HEPARIN SODIUM 5000 UNITS: 5000 INJECTION INTRAVENOUS; SUBCUTANEOUS at 02:05

## 2024-05-31 RX ADMIN — Medication 500 MG: at 09:05

## 2024-05-31 RX ADMIN — SODIUM CHLORIDE: 9 INJECTION, SOLUTION INTRAVENOUS at 08:05

## 2024-05-31 RX ADMIN — HEPARIN SODIUM 1000 UNITS: 1000 INJECTION, SOLUTION INTRAVENOUS; SUBCUTANEOUS at 11:05

## 2024-05-31 RX ADMIN — HEPARIN SODIUM 5000 UNITS: 5000 INJECTION INTRAVENOUS; SUBCUTANEOUS at 09:05

## 2024-05-31 RX ADMIN — ERYTHROPOIETIN 6100 UNITS: 10000 INJECTION, SOLUTION INTRAVENOUS; SUBCUTANEOUS at 08:05

## 2024-05-31 RX ADMIN — INSULIN GLARGINE 10 UNITS: 100 INJECTION, SOLUTION SUBCUTANEOUS at 09:05

## 2024-05-31 RX ADMIN — HEPARIN SODIUM 5000 UNITS: 5000 INJECTION INTRAVENOUS; SUBCUTANEOUS at 06:05

## 2024-05-31 RX ADMIN — METOPROLOL TARTRATE 25 MG: 25 TABLET, FILM COATED ORAL at 09:05

## 2024-05-31 RX ADMIN — HEPARIN SODIUM 3000 UNITS: 1000 INJECTION, SOLUTION INTRAVENOUS; SUBCUTANEOUS at 07:05

## 2024-05-31 NOTE — PROGRESS NOTES
[facility] STAFF HEMODIALYSIS NOTE     Patient currently on hemodialysis for removal of uremic toxins and volume.     Patient seen and evaluated on hemodialysis, tolerating treatment, see HD flowsheet for vitals and assessments.    Labs have been reviewed and the dialysate bath has been adjusted.       Assessment/Plan:    -Pending placement   -Patient seen on HD, tolerating treatment well, w/o complaints   -UF goal of 2.5L  -Renal diet, if not NPO   -Strict I/O's and daily weights  -Daily renal function panels  -Keep MAP >65 while on HD   -Hgb goal 10-11, continue epo  -Will continue to follow while inpatient     Delfina Shi DNP-FNP, C  Nephrology  Pager: [phone]

## 2024-05-31 NOTE — NURSING
3.5 hours dialysis treatment was completed .2L  UF  was removed .Unable  to remove  2.5 L as order due to increase in HR  to 120, asymptomatic . .Epoetin given . RIJ Catheters was heparin locked . Catheters were wrapped and taped .Report  given to ELISABETH Stewart. Patient was transported on bed  by transporter.

## 2024-05-31 NOTE — PT/OT/SLP PROGRESS
Occupational Therapy  Co- Treatment    Name: Sarah Saravia  MRN: [id number]  Admitting Diagnosis:  Acute cystitis with hematuria     Co-treatment performed due to patient's multiple deficits requiring two skilled therapists to appropriately and safely assess patient's strength and endurance while facilitating functional tasks in addition to accommodating for patient's activity tolerance.     Recommendations:     Discharge Recommendations: Moderate Intensity Therapy  Discharge Equipment Recommendations:  lift device, hospital bed, wheelchair  Barriers to discharge:  Other (Comment) (requires significant assist)    Assessment:     Sarah Saravia is a 53 y.o. female with a medical diagnosis of Acute cystitis with hematuria.  She presents with deficits in mobility, cognition, and self-care tasks. Pt. Does continue to require significant assist for all mobility. Sitting balance has improved as well as participation in familiar tasks following demonstration and at times assist to initiate. Patient would benefit from continued OT services to maximize level of safety and independence with self-care tasks.    . Performance deficits affecting function are weakness, impaired endurance, impaired self care skills, impaired functional mobility, impaired balance, impaired cognition, decreased upper extremity function, decreased lower extremity function.     Rehab Prognosis:  Good; patient would benefit from acute skilled OT services to address these deficits and reach maximum level of function.       Plan:     Patient to be seen 3 x/week to address the above listed problems via self-care/home management, therapeutic activities, therapeutic exercises, neuromuscular re-education  Plan of Care Expires: 06/13/24  Plan of Care Reviewed with: patient, daughter    Subjective     Chief Complaint: Pt. With aphasia and no verbalizations on this date but at start of session indicated pain in BLE  Patient/Family Comments/goals: No goals  stated  Pain/Comfort:  Pain Rating 1:  (did not rate)  Location - Side 1: Bilateral  Location 1: leg  Pain Addressed 1: Reposition, Distraction  Pain Rating Post-Intervention 1:  (no increases in pain)    Objective:     Communicated with: nurse prior to session.  Patient found supine with PEG Tube upon OT entry to room. Daughter present; pt. On stepwise bed.     General Precautions: Standard, fall, aspiration, NPO, aphasia  contact  Orthopedic Precautions:N/A  Braces: N/A  Respiratory Status: Room air     Occupational Performance:     Bed Mobility:    Patient completed Scooting/Bridging with total assistance and 2 persons to scoot to EOB; Max/Total A  x 2 for lateral scoots towards HOB  Patient completed Supine to Sit with maximal assistance and 2 persons   Patient completed Sit to Supine with total assistance and 2 persons     Functional Mobility/Transfers:  Drawsheet x 2 to HOB  Functional Mobility: not tested    Activities of Daily Living:  Grooming: moderate assistance to apply lotion to BLE with assist to apply below knees; SBA to apply lip balm, did not attempt to wash face on this date when given warm wash cloth and demonstrated cues but instead used wash cloth to wipe legs (thigh region)      [facility] 6 Click ADL: 12    Treatment & Education:  Pt. Sat EOB with SBA/CGA on this date once positioned with BLE on floor.   Pt. Engaged in activity of folding large towels with good use of bUE noted. Pt. Required demonstration and tactile cues to fold 3/5 towels on this date  No verbalizations noted on this date    Patient left supine with all lines intact, call button in reach, and family present    GOALS:   Multidisciplinary Problems       Occupational Therapy Goals          Problem: Occupational Therapy    Goal Priority Disciplines Outcome Interventions   Occupational Therapy Goal     OT, PT/OT Progressing    Description: Goals to be met by: 5/22/24 Goals revised as needed on 05-30 to be met by 06-19:    Patient will  increase functional independence with ADLs by performing:    UE Dressing with Maximum Assistance.MET 05-30  Revised: UBD with Min A  Grooming while EOB with Maximum Assistance.Met 05-30  Revised: Grooming seated EOB with CGA  Sitting at edge of bed x5 minutes with Maximum Assistance. - Met 5/22  Revised: Sit EOB x 20 minutes with SBA  Rolling to Bilateral with Moderate Assistance.NOT MET     Supine to sit with Maximum Assistance. NOT MET                           Time Tracking:     OT Date of Treatment: 05/31/24  OT Start Time: 1300  OT Stop Time: 1335  OT Total Time (min): 35 min    Billable Minutes:Self Care/Home Management 20  Therapeutic Activity 15    OT/JOSÉ: OT     Number of JOSÉ visits since last OT visit: 1 5/31/2024

## 2024-05-31 NOTE — ASSESSMENT & PLAN NOTE
Patient has hyponatremia which is uncontrolled,We will aim to correct the sodium by 4-6mEq in 24 hours. We will monitor sodium Daily. The hyponatremia is due to ESRD. Discussed with nephrology, dialysate bath adjusted to account for and correct hyponatremia.  Recent Labs   Lab 05/31/24  0416   *     IMPROVING  - Daily morning CMP  - Continue to montior

## 2024-05-31 NOTE — NURSING
3.5 hours dialysis treatment was completed .2L  UF  was removed .Unable  to remove  2.5 L as order due to increase in HR  to 120, asymptomatic . .Epoetin given . RIJ Catheters was heparin locked . Catheters were wrapped and taped .Report  given to ELISABETH Stewart. Patient was transported on bed .

## 2024-05-31 NOTE — PLAN OF CARE
Met with patient and her daughter Aleks to discuss discharge planning. She informed this CM that she has gathered her mother's financial records but was told be Daniellet that they could not start the long term admission process until [facility] had confirmed a dialysis chair. Will continue to follow.    Sara Loredo RN  Ext [phone]

## 2024-05-31 NOTE — PROGRESS NOTES
[facility] - Telemetry 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 History was given from patient's daughter. She was undergoing dialysis today and noticed she was lethargic, less alert than usual self. Pt completed dialysis and still not acting herself. EMS was called, fever of a 100.  On chart review, she did have an episode of large volume emesis around 1700.  Per EMS, she had a slightly elevated temp 100.0°F, glucose 300s.     In the ED: UA 2+ leuks, >100 WBC, many bacteria, WBC 17, CT abd/pelvis concerning for cystitis. Given vanc/zosyn    Overview/Hospital Course:  Patient was admitted to Hospital Medicine service for medical management and evaluation of urosepsis. Patient was continued on vanc/zosyn. Vascular Neurology consulted concerning mental status change with the recommendation to initiate ASA 81 QD and to obtain an MRI to r/o new stroke. Imaging pending. Nephrology was consulted for regularly scheduled dialysis. Afternoon of 4/12, patient was unable to tolerate filtration of volume during dialsis 2/2 hypotension. Patient received 500cc bolus and was transferred back to floor after finishing the session without volume removed. Will monitor for signs of volume overload. Tailoring insulin regimen while uptitrating tube feeds to goal rate. Staph Epi in all 4 bottles; ID with recommendation to continue Vanc & de-escalate meropenem to ertapenem on 04/15 through 4/16. Patient completed IV course of UTI coverage. Patient tolerated volume removal well in dialysis  throughout the rest of her hospital stay. Pending discharge to [facility]ACH pending bed availability. Patient without BM with one episode of vomiting overnight 4/17. KUB with bowel gas and low concern for obstruction with no transition point noted. Escalating bowel regimen. Pending placement as of 4/22. Pulm consult placed to evaluate for possible trach downsize & eventual decannulation to assist placement if safe. Trach capping trial per pulm for 48h and will assess for decannulation on Monday. DVT studies negative. Pulm successfully decannulated pt 4/30, IR exchanged TDC. Pending swallow study with SLP and waiting for dispo options. Pt failed swallow study, placement pending. Working with PT on mobilize pt to sitting in a chair to expand placement options. Pt successfully mobilized using sandee lift but required 2 people to do so. Discussing dispo plan with family, likely d/c home if HD, DME needs able to be met. Pending acceptance at outpatient HD center. Ongoing placement difficulties d/t need for accepting HD facility to have sandee lift with 2 personnel to operate. Family meeting to discuss disposition options planned for Thursday 5/23 at 1 PM. Per family meeting referral sent to [facility], pending placement    Interval History: NAEON pending placement      Objective:     Vital Signs (Most Recent):  Temp: 99.3 °F (37.4 °C) (05/31/24 0354)  Pulse: (!) 114 (05/31/24 1134)  Resp: 18 (05/31/24 1134)  BP: 123/79 (05/31/24 1134)  SpO2: 100 % (05/31/24 1134) Vital Signs (24h Range):  Temp:  [97.7 °F (36.5 °C)-99.3 °F (37.4 °C)] 99.3 °F (37.4 °C)  Pulse:  [] 114  Resp:  [18-20] 18  SpO2:  [95 %-100 %] 100 %  BP: (108-154)/(69-91) 123/79     Weight: 105.5 kg (232 lb 9.4 oz)  Body mass index is 36.43 kg/m².    Intake/Output Summary (Last 24 hours) at 5/31/2024 1339  Last data filed at 5/31/2024 1134  Gross per 24 hour   Intake 400 ml   Output 2753 ml   Net -2353 ml         Physical Exam  Vitals and nursing note  reviewed.   Constitutional:       General: She is not in acute distress.     Appearance: She is not toxic-appearing or diaphoretic.   HENT:      Head: Normocephalic and atraumatic.   Eyes:      General:         Right eye: No discharge.         Left eye: No discharge.      Conjunctiva/sclera: Conjunctivae normal.   Neck:      Comments: Decannulated  Cardiovascular:      Rate and Rhythm: Normal rate and regular rhythm.      Heart sounds: Normal heart sounds.   Pulmonary:      Effort: Pulmonary effort is normal. No respiratory distress.   Abdominal:      General: Abdomen is flat. There is no distension.      Tenderness: There is no abdominal tenderness.      Comments: PEG tube in place with new skin breakdown and retraction   Musculoskeletal:      Right lower leg: No edema.      Left lower leg: No edema.   Skin:     General: Skin is warm and dry.   Neurological:      General: No focal deficit present.      Mental Status: She is alert.      Comments: Nonverbal             Significant Labs: All pertinent labs within the past 24 hours have been reviewed.    Significant Imaging: I have reviewed all pertinent imaging results/findings within the past 24 hours.    Assessment/Plan:      * Acute cystitis with hematuria  resolved    Pt presenting with AMS and worsening lethargy. Pt is non-verbal at baseline, does not follow commands, but was less responsive than normal. Pt found to have a fever. Urinary source suspected based off of urine and CT abd/pelvis. Pt has many prior resistant bacterial infections including urinary sources. Has prior with sensitivity to zosyn and has also improved off ED dose of vanc/zosyn. Lactic elevated a 3.8->3.49 prior to completion of fluid bolus 500ml.    Patient with new fever and tachycardia on 4/25. Low threshold to initiate additional infectious workup and repeat UA.     Plan  S/p course of vanc/ertapenem per ID. Course completed 4/16.  Daily cbc  Contact precautions    *on contact precautions  indefinitely while patient still makes urine given pt incontinent per infection control    Vulvovaginal candidiasis  Noted 5/29, given 150mg fluconazole x1      Irritant contact dermatitis due friction or contact with other specified body fluids  Wound care      Debility  Needs:  Wheelchair: patient has a mobility limitation that significantly impairs her ability to participate in one or more mobility related activities of daily living (MRADL's) such as toileting, feeding, dressing, grooming, and bathing in customary locations in the home.  The mobility limitation cannot be sufficiently resolved by the use of a cane or walker.   The use of a manual wheelchair will significantly improve the patient's ability to participate in MRADLS and the patient will use it on regular basis in the home.  Family/pt has expressed her willingness to use a manual wheelchair in the home.  She also has a caregiver who is capable of assisting in mobility.    Mrs Saravia requires a hospital bed due to her requiring positioning of the body in ways not feasible with an ordinary bed to alleviate pain/ is completely immobile /or limited mobility and cannot independently make changes in body position without the use of the bed.  The positioning of the body cannot be sufficiently resolved by the use of pillows and wedges    Patient has a mobility limitation that significantly impairs their ability to participate in one or more mobility related activities of daily living, including toileting. This deficit can be resolved by using a bedside commode. Patient demonstrates mobility limitations that will cause them to be confined to one room at home without bathroom access for up to 30 days. Using a bedside commode will greatly improve the patient's ability to participate in MRADLs       Complication of vascular dialysis catheter  Cath intermittently clogging, not resolving with cath-hedy, going for IR venogram 4/30 with possible exchange. S/p exchange  with IR on 4/30      Constipation  RESOLVED with Bowel regimen  Patient without BM x5d. One episode of vomitus night of 4/17. Some concern for bowel obstruction. Tolerating continuous tube feeds at goal rate with 0cc on residuals. Physical exam with bowel sounds, soft nontender abdomen. KUB without concern for obstruction with bowel gas, lack of transition point.    Patient now with regular bowel movements on bowel regimen.     Plan  - Miralax BID, Senna BID  - compazine PRN    Moderate malnutrition  Nutrition consulted. Most recent weight and BMI monitored-     Measurements:  Wt Readings from Last 1 Encounters:   05/29/24 105.5 kg (232 lb 9.4 oz)   Body mass index is 36.43 kg/m².    Patient has been screened and assessed by RD.    Malnutrition Type:  Context: acute illness or injury  Level: moderate    Malnutrition Characteristic Summary:  Weight Loss (Malnutrition): 10% in 6 months  Subcutaneous Fat (Malnutrition): mild depletion  Muscle Mass (Malnutrition): mild depletion  Fluid Accumulation (Malnutrition): mild    Interventions/Recommendations (treatment strategy):  1.    -- TF  -- going for swallow study with SLP to assess possibility of pleasure oral feedings --> failed repeatedly    Prolonged QT interval  Qtc 526 [04/10/24]      limit Qtc prolonging drugs as able    Spastic hemiplegia of right dominant side as late effect of cerebrovascular disease  Important that patient is able to sit in chair for 4h for dialysis placement needs, even if she requires lift/assistance getting into the chair     This patient has Chronic right hemiplegia due to stroke. Physical therapy services has been scheduled. Continue all standard measures for pressure injury prevention and consult wound care for any wounds (chronic or acute).    ESRD (end stage renal disease) on dialysis  Creatine stable for now. BMP reviewed- noted Estimated Creatinine Clearance: 15.1 mL/min (A) (based on SCr of 5.4 mg/dL (H)). according to latest data.  Based on current GFR, CKD stage is end stage.  Monitor UOP and serial BMP and adjust therapy as needed. Renally dose meds. Avoid nephrotoxic medications and procedures.    -- HD MWF  -- nephro following    Status post tracheostomy  Resolved, decannulated 4/30    Acute encephalopathy  Pt is non-verbal and does not follow commands at baseline. Vascular Neurology consulted given acute change from baseline. MRI with concern for evolution of prior strokes with noted differential of T2 hyperintensities including vasculitis vs demyelination. Discussed with Vascular Neurology; low concern for ongoing vasculitis/demyelination, likely represents typical evolution of prior infarcts.    Patient at baseline as of 4/22.     Plan  RESOLVED  - Repeat head imaging if concern of new change in mental status/focal deficit    Type 2 diabetes mellitus with hyperglycemia, with long-term current use of insulin  Adjusting insulin to account for diabetic TF    Patient's FSGs are controlled on current medication regimen.  Last A1c reviewed-   Lab Results   Component Value Date    HGBA1C 6.2 (H) 05/27/2024     Most recent fingerstick glucose reviewed-   Recent Labs   Lab 05/30/24  1557 05/30/24  2017   POCTGLUCOSE 197* 179*       Current correctional scale  Medium  Maintain anti-hyperglycemic dose as follows-   Antihyperglycemics (From admission, onward)      Start     Stop Route Frequency Ordered    05/27/24 1633  insulin aspart U-100 pen 0-5 Units         -- SubQ Before meals & nightly PRN 05/27/24 1533    05/25/24 2100  insulin glargine U-100 (Lantus) pen 10 Units         -- SubQ Nightly 05/25/24 1035          Hold Oral hypoglycemics while patient is in the hospital.  POCT glucose q6h    Hyponatremia  Patient has hyponatremia which is uncontrolled,We will aim to correct the sodium by 4-6mEq in 24 hours. We will monitor sodium Daily. The hyponatremia is due to ESRD. Discussed with nephrology, dialysate bath adjusted to account for and  correct hyponatremia.  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 Pt underwent extensive course, currently still healing from this, but no longer has wound vac. Pt's wound currently covered with bandage. Picture in media tabs    Plan  Wound care        VTE Risk Mitigation (From admission, onward)           Ordered     heparin (porcine) injection 5,000 Units  Every 8 hours         05/29/24 0823     heparin (porcine) injection 1,000 Units  As needed (PRN)         05/21/24 1017     heparin (porcine) injection 3,000 Units  As needed (PRN)         04/17/24 0825                    Discharge Planning   ZEKE: 6/7/2024     Code Status: Full Code   Is the patient medically ready for discharge?: No    Reason for patient still in hospital (select all that apply): Patient trending condition  Discharge Plan A: Skilled Nursing Facility                  Osito Dos Santos MD  Department of Hospital Medicine   [facility] - Telemetry Stepdown

## 2024-05-31 NOTE — PLAN OF CARE
Problem: Physical Therapy  Goal: Physical Therapy Goal  Description: Goals to be met by: 24   Goals remain appropriate 5/3/2024 to be met by 24  Goals updated 2024 to be met by 24  Goals updated 24 to be met by 24    Patient will increase functional independence with mobility by performin. Supine to sit with Maximum Assistance  2. Sit to supine with Maximum Assistance  3. Rolling to Left and Right with Moderate Assistance.  4. Sitting at edge of bed 5 minutes with Moderate Assistance- MET , monitor consistency  4a. Sitting at edge of bed 10 minutes with Supervision  5. Lower extremity exercise program x20 reps per handout, with assistance as needed  6. Sit to stand transfer with Maximum Assistance with or without LRAD  7. Stand for 2 minutes with Maximum Assistance with or without LRAD    Outcome: Progressing

## 2024-05-31 NOTE — PLAN OF CARE
[facility] - Telemetry Stepdown  Discharge Reassessment    Primary Care Provider: No, Primary Doctor    Expected Discharge Date: 6/7/2024    Reassessment (most recent)       Discharge Reassessment - 05/31/24 1459          Discharge Reassessment    Assessment Type Discharge Planning Reassessment     Did the patient's condition or plan change since previous assessment? No     Discharge Plan discussed with: Adult children     Communicated ZEKE with patient/caregiver Yes     Discharge Plan A New Nursing Home placement - assisted care facility     Discharge Plan B New Nursing Home placement - assisted care facility     DME Needed Upon Discharge  other (see comments)   TBD    Transition of Care Barriers DIalysis placement issues     Why the patient remains in the hospital Placement issues        Post-Acute Status    Post-Acute Authorization Placement     Post-Acute Placement Status Referrals Sent     Diaylsis Status Referrals Sent                   Discharge Plan A and Plan B have been determined by review of patient's clinical status, future medical and therapeutic needs, and coverage/benefits for post-acute care in coordination with multidisciplinary team members.       Sara Loredo RN  Ext [phone]

## 2024-05-31 NOTE — ASSESSMENT & PLAN NOTE
Adjusting insulin to account for diabetic TF    Patient's FSGs are controlled on current medication regimen.  Last A1c reviewed-   Lab Results   Component Value Date    HGBA1C 6.2 (H) 05/27/2024     Most recent fingerstick glucose reviewed-   Recent Labs   Lab 05/30/24  1557 05/30/24  2017   POCTGLUCOSE 197* 179*       Current correctional scale  Medium  Maintain anti-hyperglycemic dose as follows-   Antihyperglycemics (From admission, onward)    Start     Stop Route Frequency Ordered    05/27/24 1633  insulin aspart U-100 pen 0-5 Units         -- SubQ Before meals & nightly PRN 05/27/24 1533    05/25/24 2100  insulin glargine U-100 (Lantus) pen 10 Units         -- SubQ Nightly 05/25/24 1035        Hold Oral hypoglycemics while patient is in the hospital.  POCT glucose q6h

## 2024-05-31 NOTE — NURSING
..Nurses Note -- 4 Eyes      5/30/2024   1900      Skin assessed during: Q Shift Change      [] No Altered Skin Integrity Present    []Prevention Measures Documented      [x] Yes- Altered Skin Integrity Present or Discovered   [] LDA Added if Not in Epic (Describe Wound)   [] New Altered Skin Integrity was Present on Admit and Documented in LDA   [] Wound Image Taken    Wound Care Consulted? No    Attending Nurse:  Radha Eastman RN/Staff Member:  Beth

## 2024-05-31 NOTE — NURSING
Patient arrived  in the unit at around 0745hours . Dialysis started at 0804hours .VSS . Tolerating well . Patient is in Contact Isolation.

## 2024-05-31 NOTE — PT/OT/SLP PROGRESS
Physical Therapy Co-Treatment    Patient Name:  Sarah Saravia   MRN:  [id number]    Recommendations:     Discharge Recommendations: Moderate Intensity Therapy  Discharge Equipment Recommendations: lift device, hospital bed, wheelchair  Barriers to discharge:  Increased skilled assistance required    Assessment:   Co-treatment performed due to patient's multiple deficits requiring two skilled therapists to appropriately and safely assess patient's strength, endurance, functional mobility, and ADL performance while facilitating functional tasks in addition to accommodating for patient's activity tolerance and medical acuity.    Sarah Saravia is a 53 y.o. female admitted with a medical diagnosis of Acute cystitis with hematuria.  She presents with the following impairments/functional limitations: weakness, impaired endurance, impaired self care skills, impaired functional mobility, impaired balance, impaired cognition, decreased lower extremity function, decreased upper extremity function, decreased safety awareness, pain, decreased ROM, impaired coordination. Despite heightened fatigue related to recent return from dialysis, patient demonstrated increased motivation to participate in treatment session, with good response to completed activities and provided education. Due to fatigue levels, sit<>stands not performed. She continued to require significant cuing and physical assistance of 2 persons for bed level transitions, however improvements in sequencing well observed. Patient required assistance level of CGA-SBA for static/dynamic sitting balance and was more engaged in EOB activities. Patient continues to demonstrate the need for moderate intensity therapy on a daily basis exhibited by decreased independence with self-care and functional mobility.    Rehab Prognosis: Good; patient would benefit from acute skilled PT services to address these deficits and reach maximum level of function.    Recent Surgery: * No  "surgery found *      Plan:     During this hospitalization, patient to be seen 3 x/week to address the identified rehab impairments via therapeutic activities, therapeutic exercises, gait training, neuromuscular re-education and progress toward the following goals:    Plan of Care Expires:  06/22/24    Subjective     Chief Complaint: L knee pain ; Post-dialysis fatigue  Patient/Family Comments/goals: To get better  Pain/Comfort:  Pain Rating 1: Intensity not indicated  Location - Side 1: Left  Location - Orientation 1: generalized  Location 1: knee  Pain Addressed 1: Reposition, Distraction  Pain Rating Post-Intervention 1: Intensity not indicated      Objective:     Communicated with RN prior to session.  Patient found supine with PEG Tube (Immerse mattress) upon PT entry to room.     General Precautions: Standard, fall, aspiration, NPO, aphasia   Orthopedic Precautions:N/A   Braces: N/A   Body mass index is 36.43 kg/m².  Oxygen Device: Room Air  Vitals: /80 (Patient Position: Lying)   Pulse (!) 120   Temp 99.1 °F (37.3 °C) (Oral)   Resp 20   Ht 5' 7" (1.702 m)   Wt 105.5 kg (232 lb 9.4 oz)   SpO2 98%   BMI 36.43 kg/m²      Functional Mobility:  Bed Mobility: Cuing required for breakdown of complex motor sequence into single steps    EOB Scooting:   Anterior: TotalAx2  Posterior: TotalAx2  Lateral (R): TotalAx2  Boosting: TotalAx2 with HOB Flat & positioned in trendelenburg  Supine>Sit: x1, MaxAx2 with HOB Elevated  Sit>Supine: x1, TotalAx2 with HOB Flat    Transfers:     Sit<>Stand: Not assessed this date due to fatigue related to acuity of returning from dialysis    Balance:   Static Sitting:  CGA-SBA  Dynamic Sitting: CGA-SBA    AM-PAC 6 CLICK MOBILITY  Turning over in bed (including adjusting bedclothes, sheets and blankets)?: 2  Sitting down on and standing up from a chair with arms (e.g., wheelchair, bedside commode, etc.): 2  Moving from lying on back to sitting on the side of the bed?: " 2  Moving to and from a bed to a chair (including a wheelchair)?: 1  Need to walk in hospital room?: 1  Climbing 3-5 steps with a railing?: 1  Basic Mobility Total Score: 9     Treatment & Education:  Patient participated in the following activities:  LAQ:   L: x5 AROM  R: x3 P/AAROM. Increased encouragement required   Pt endorsing sensation in RLE  Anterior WS during OT-specific reaching tasks. Increased encouragement required    Patient Education Provided on:  The role of physical therapy and how the patient can benefit from skilled services  The negative effects of prolonged bed rest/sedentary behavior, along with the importance of OOB activity & patient participation with PT  The importance of contacting RN, via call light, for mobility throughout the day  Pt white board updated with current therapists name and level of mobility assistance needed.     Patient Demonstrated and Signaled understanding of all topics touched on this date. All questions answered within the PT scope of practice    Patient left HOB elevated with all lines intact, call button in reach, and Daughter present..    GOALS:   Multidisciplinary Problems       Physical Therapy Goals          Problem: Physical Therapy    Goal Priority Disciplines Outcome Goal Variances Interventions   Physical Therapy Goal     PT, PT/OT Progressing     Description: Goals to be met by: 24   Goals remain appropriate 5/3/2024 to be met by 24  Goals updated 2024 to be met by 24  Goals updated 24 to be met by 24    Patient will increase functional independence with mobility by performin. Supine to sit with Maximum Assistance  2. Sit to supine with Maximum Assistance  3. Rolling to Left and Right with Moderate Assistance.  4. Sitting at edge of bed 5 minutes with Moderate Assistance- MET , monitor consistency  4a. Sitting at edge of bed 10 minutes with Supervision  5. Lower extremity exercise program x20 reps per handout,  with assistance as needed  6. Sit to stand transfer with Maximum Assistance with or without LRAD  7. Stand for 2 minutes with Maximum Assistance with or without LRAD                         Time Tracking:     PT Received On: 05/31/24  PT Start Time: 1257     PT Stop Time: 1340  PT Total Time (min): 43 min     Billable Minutes: Therapeutic Activity 20, Therapeutic Exercise 8, and Neuromuscular Re-education 15    Treatment Type: Treatment  PT/PTA: PT     Number of PTA visits since last PT visit: 0     05/31/2024

## 2024-05-31 NOTE — SUBJECTIVE & OBJECTIVE
Interval History: NAEON pending placement      Objective:     Vital Signs (Most Recent):  Temp: 99.3 °F (37.4 °C) (05/31/24 0354)  Pulse: (!) 114 (05/31/24 1134)  Resp: 18 (05/31/24 1134)  BP: 123/79 (05/31/24 1134)  SpO2: 100 % (05/31/24 1134) Vital Signs (24h Range):  Temp:  [97.7 °F (36.5 °C)-99.3 °F (37.4 °C)] 99.3 °F (37.4 °C)  Pulse:  [] 114  Resp:  [18-20] 18  SpO2:  [95 %-100 %] 100 %  BP: (108-154)/(69-91) 123/79     Weight: 105.5 kg (232 lb 9.4 oz)  Body mass index is 36.43 kg/m².    Intake/Output Summary (Last 24 hours) at 5/31/2024 1339  Last data filed at 5/31/2024 1134  Gross per 24 hour   Intake 400 ml   Output 2753 ml   Net -2353 ml         Physical Exam  Vitals and nursing note reviewed.   Constitutional:       General: She is not in acute distress.     Appearance: She is not toxic-appearing or diaphoretic.   HENT:      Head: Normocephalic and atraumatic.   Eyes:      General:         Right eye: No discharge.         Left eye: No discharge.      Conjunctiva/sclera: Conjunctivae normal.   Neck:      Comments: Decannulated  Cardiovascular:      Rate and Rhythm: Normal rate and regular rhythm.      Heart sounds: Normal heart sounds.   Pulmonary:      Effort: Pulmonary effort is normal. No respiratory distress.   Abdominal:      General: Abdomen is flat. There is no distension.      Tenderness: There is no abdominal tenderness.      Comments: PEG tube in place with new skin breakdown and retraction   Musculoskeletal:      Right lower leg: No edema.      Left lower leg: No edema.   Skin:     General: Skin is warm and dry.   Neurological:      General: No focal deficit present.      Mental Status: She is alert.      Comments: Nonverbal             Significant Labs: All pertinent labs within the past 24 hours have been reviewed.    Significant Imaging: I have reviewed all pertinent imaging results/findings within the past 24 hours.

## 2024-05-31 NOTE — PROGRESS NOTES
05/31/24 1220   WOCN Assessment   WOCN Total Time (mins) 15   Visit Date 05/31/24   Visit Time 1220   Consult Type Follow Up   WOCN Speciality Wound   Intervention chart review  (pt-OOR for dialysis. Family in room.)     Will return for Fu assmt at later time.    Niki TIWARIN, RN, CWOCN

## 2024-06-01 LAB
POCT GLUCOSE: 170 MG/DL (ref 70–110)
POCT GLUCOSE: 177 MG/DL (ref 70–110)
POCT GLUCOSE: 195 MG/DL (ref 70–110)

## 2024-06-01 PROCEDURE — 20600001 HC STEP DOWN PRIVATE ROOM

## 2024-06-01 PROCEDURE — 25000003 PHARM REV CODE 250

## 2024-06-01 PROCEDURE — 27000207 HC ISOLATION

## 2024-06-01 PROCEDURE — 63600175 PHARM REV CODE 636 W HCPCS

## 2024-06-01 PROCEDURE — 25000242 PHARM REV CODE 250 ALT 637 W/ HCPCS

## 2024-06-01 RX ADMIN — ASPIRIN 81 MG CHEWABLE TABLET 81 MG: 81 TABLET CHEWABLE at 09:06

## 2024-06-01 RX ADMIN — METOPROLOL TARTRATE 25 MG: 25 TABLET, FILM COATED ORAL at 09:06

## 2024-06-01 RX ADMIN — PANTOPRAZOLE SODIUM 40 MG: 40 GRANULE, DELAYED RELEASE ORAL at 09:06

## 2024-06-01 RX ADMIN — PSYLLIUM HUSK 1 PACKET: 3.4 POWDER ORAL at 09:06

## 2024-06-01 RX ADMIN — Medication 500 MG: at 09:06

## 2024-06-01 RX ADMIN — HEPARIN SODIUM 5000 UNITS: 5000 INJECTION INTRAVENOUS; SUBCUTANEOUS at 01:06

## 2024-06-01 RX ADMIN — HEPARIN SODIUM 5000 UNITS: 5000 INJECTION INTRAVENOUS; SUBCUTANEOUS at 06:06

## 2024-06-01 RX ADMIN — ATORVASTATIN CALCIUM 40 MG: 40 TABLET, FILM COATED ORAL at 09:06

## 2024-06-01 RX ADMIN — INSULIN GLARGINE 10 UNITS: 100 INJECTION, SOLUTION SUBCUTANEOUS at 09:06

## 2024-06-01 RX ADMIN — HEPARIN SODIUM 5000 UNITS: 5000 INJECTION INTRAVENOUS; SUBCUTANEOUS at 09:06

## 2024-06-01 RX ADMIN — ZINC SULFATE 220 MG (50 MG) CAPSULE 220 MG: CAPSULE at 09:06

## 2024-06-01 NOTE — SUBJECTIVE & OBJECTIVE
Interval History: NAEON. VSS, HDS. No updated labs this morning. Medically stable and awaiting placement      Objective:     Vital Signs (Most Recent):  Temp: 98.1 °F (36.7 °C) (06/01/24 0805)  Pulse: 102 (06/01/24 0805)  Resp: 18 (06/01/24 0439)  BP: 136/82 (06/01/24 0805)  SpO2: 100 % (06/01/24 0805) Vital Signs (24h Range):  Temp:  [98.1 °F (36.7 °C)-99.1 °F (37.3 °C)] 98.1 °F (36.7 °C)  Pulse:  [] 102  Resp:  [17-20] 18  SpO2:  [96 %-100 %] 100 %  BP: (108-136)/(69-83) 136/82     Weight: 105.5 kg (232 lb 9.4 oz)  Body mass index is 36.43 kg/m².    Intake/Output Summary (Last 24 hours) at 6/1/2024 0832  Last data filed at 5/31/2024 1134  Gross per 24 hour   Intake 400 ml   Output 2753 ml   Net -2353 ml         Physical Exam  Vitals and nursing note reviewed.   Constitutional:       General: She is not in acute distress.     Appearance: She is not ill-appearing, toxic-appearing or diaphoretic.   HENT:      Head: Normocephalic and atraumatic.   Eyes:      General: No scleral icterus.        Right eye: No discharge.         Left eye: No discharge.      Conjunctiva/sclera: Conjunctivae normal.   Neck:      Comments: Decannulated  Cardiovascular:      Rate and Rhythm: Normal rate and regular rhythm.      Heart sounds: Normal heart sounds.   Pulmonary:      Effort: Pulmonary effort is normal. No respiratory distress.   Abdominal:      General: Abdomen is flat. There is no distension.      Tenderness: There is no abdominal tenderness.      Comments: PEG   Musculoskeletal:      Right lower leg: No edema.      Left lower leg: No edema.   Skin:     General: Skin is warm and dry.   Neurological:      General: No focal deficit present.      Mental Status: She is alert.      Comments: Nonverbal             Significant Labs: All pertinent labs within the past 24 hours have been reviewed.    Significant Imaging: I have reviewed all pertinent imaging results/findings within the past 24 hours.

## 2024-06-01 NOTE — PROGRESS NOTES
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 History was given from patient's daughter. She was undergoing dialysis today and noticed she was lethargic, less alert than usual self. Pt completed dialysis and still not acting herself. EMS was called, fever of a 100.  On chart review, she did have an episode of large volume emesis around 1700.  Per EMS, she had a slightly elevated temp 100.0°F, glucose 300s.     In the ED: UA 2+ leuks, >100 WBC, many bacteria, WBC 17, CT abd/pelvis concerning for cystitis. Given vanc/zosyn    Overview/Hospital Course:  Patient was admitted to Hospital Medicine service for medical management and evaluation of urosepsis. Patient was continued on vanc/zosyn. Vascular Neurology consulted concerning mental status change with the recommendation to initiate ASA 81 QD and to obtain an MRI to r/o new stroke. Imaging pending. Nephrology was consulted for regularly scheduled dialysis. Afternoon of 4/12, patient was unable to tolerate filtration of volume during dialsis 2/2 hypotension. Patient received 500cc bolus and was transferred back to floor after finishing the session without volume removed. Will monitor for signs of volume overload. Tailoring insulin regimen while uptitrating tube feeds to goal rate. Staph Epi in all 4 bottles; ID with recommendation to continue Vanc & de-escalate meropenem to ertapenem on 04/15 through 4/16. Patient completed IV course of UTI coverage. Patient tolerated volume removal well in dialysis  throughout the rest of her hospital stay. Pending discharge to LTACH pending bed availability. Patient without BM with one episode of vomiting overnight 4/17. KUB with bowel gas and low concern for obstruction with no transition point noted. Escalating bowel regimen. Pending placement as of 4/22. Pulm consult placed to evaluate for possible trach downsize & eventual decannulation to assist placement if safe. Trach capping trial per pulm for 48h and will assess for decannulation on Monday. DVT studies negative. Pulm successfully decannulated pt 4/30, IR exchanged TDC. Pending swallow study with SLP and waiting for dispo options. Pt failed swallow study, placement pending. Working with PT on mobilize pt to sitting in a chair to expand placement options. Pt successfully mobilized using sandee lift but required 2 people to do so. Discussing dispo plan with family, likely d/c home if HD, DME needs able to be met. Pending acceptance at outpatient HD center. Ongoing placement difficulties d/t need for accepting HD facility to have sandee lift with 2 personnel to operate. Family meeting to discuss disposition options planned for Thursday 5/23 at 1 PM. Per family meeting referral sent to [facility], pending placement    Interval History: NAEON. VSS, HDS. No updated labs this morning. Medically stable and awaiting placement      Objective:     Vital Signs (Most Recent):  Temp: 98.1 °F (36.7 °C) (06/01/24 0805)  Pulse: 102 (06/01/24 0805)  Resp: 18 (06/01/24 0439)  BP: 136/82 (06/01/24 0805)  SpO2: 100 % (06/01/24 0805) Vital Signs (24h Range):  Temp:  [98.1 °F (36.7 °C)-99.1 °F (37.3 °C)] 98.1 °F (36.7 °C)  Pulse:  [] 102  Resp:  [17-20] 18  SpO2:  [96 %-100 %] 100 %  BP: (108-136)/(69-83) 136/82     Weight: 105.5 kg (232 lb 9.4 oz)  Body mass index is 36.43 kg/m².    Intake/Output Summary (Last 24 hours) at 6/1/2024 0832  Last data filed at 5/31/2024 1134  Gross per 24 hour   Intake 400 ml   Output 2753 ml   Net -3154  ml         Physical Exam  Vitals and nursing note reviewed.   Constitutional:       General: She is not in acute distress.     Appearance: She is not ill-appearing, toxic-appearing or diaphoretic.   HENT:      Head: Normocephalic and atraumatic.   Eyes:      General: No scleral icterus.        Right eye: No discharge.         Left eye: No discharge.      Conjunctiva/sclera: Conjunctivae normal.   Neck:      Comments: Decannulated  Cardiovascular:      Rate and Rhythm: Normal rate and regular rhythm.      Heart sounds: Normal heart sounds.   Pulmonary:      Effort: Pulmonary effort is normal. No respiratory distress.   Abdominal:      General: Abdomen is flat. There is no distension.      Tenderness: There is no abdominal tenderness.      Comments: PEG   Musculoskeletal:      Right lower leg: No edema.      Left lower leg: No edema.   Skin:     General: Skin is warm and dry.   Neurological:      General: No focal deficit present.      Mental Status: She is alert.      Comments: Nonverbal             Significant Labs: All pertinent labs within the past 24 hours have been reviewed.    Significant Imaging: I have reviewed all pertinent imaging results/findings within the past 24 hours.    Assessment/Plan:      * Acute cystitis with hematuria  resolved    Pt presenting with AMS and worsening lethargy. Pt is non-verbal at baseline, does not follow commands, but was less responsive than normal. Pt found to have a fever. Urinary source suspected based off of urine and CT abd/pelvis. Pt has many prior resistant bacterial infections including urinary sources. Has prior with sensitivity to zosyn and has also improved off ED dose of vanc/zosyn. Lactic elevated a 3.8->3.49 prior to completion of fluid bolus 500ml.    Patient with new fever and tachycardia on 4/25. Low threshold to initiate additional infectious workup and repeat UA.     Plan  S/p course of vanc/ertapenem per ID. Course completed 4/16.  Daily cbc  Contact  precautions    *on contact precautions indefinitely while patient still makes urine given pt incontinent per infection control    Vulvovaginal candidiasis  Noted 5/29, given 150mg fluconazole x1      Irritant contact dermatitis due friction or contact with other specified body fluids  Wound care      Debility  Needs:  Wheelchair: patient has a mobility limitation that significantly impairs her ability to participate in one or more mobility related activities of daily living (MRADL's) such as toileting, feeding, dressing, grooming, and bathing in customary locations in the home.  The mobility limitation cannot be sufficiently resolved by the use of a cane or walker.   The use of a manual wheelchair will significantly improve the patient's ability to participate in MRADLS and the patient will use it on regular basis in the home.  Family/pt has expressed her willingness to use a manual wheelchair in the home.  She also has a caregiver who is capable of assisting in mobility.    Mrs Saravia requires a hospital bed due to her requiring positioning of the body in ways not feasible with an ordinary bed to alleviate pain/ is completely immobile /or limited mobility and cannot independently make changes in body position without the use of the bed.  The positioning of the body cannot be sufficiently resolved by the use of pillows and wedges    Patient has a mobility limitation that significantly impairs their ability to participate in one or more mobility related activities of daily living, including toileting. This deficit can be resolved by using a bedside commode. Patient demonstrates mobility limitations that will cause them to be confined to one room at home without bathroom access for up to 30 days. Using a bedside commode will greatly improve the patient's ability to participate in MRADLs       Complication of vascular dialysis catheter  Cath intermittently clogging, not resolving with cath-hedy, going for IR venogram  4/30 with possible exchange. S/p exchange with IR on 4/30      Constipation  RESOLVED with Bowel regimen  Patient without BM x5d. One episode of vomitus night of 4/17. Some concern for bowel obstruction. Tolerating continuous tube feeds at goal rate with 0cc on residuals. Physical exam with bowel sounds, soft nontender abdomen. KUB without concern for obstruction with bowel gas, lack of transition point.    Patient now with regular bowel movements on bowel regimen.     Plan  - Miralax BID, Senna BID  - compazine PRN    Moderate malnutrition  Nutrition consulted. Most recent weight and BMI monitored-     Measurements:  Wt Readings from Last 1 Encounters:   05/29/24 105.5 kg (232 lb 9.4 oz)   Body mass index is 36.43 kg/m².    Patient has been screened and assessed by RD.    Malnutrition Type:  Context: acute illness or injury  Level: moderate    Malnutrition Characteristic Summary:  Weight Loss (Malnutrition): 10% in 6 months  Subcutaneous Fat (Malnutrition): mild depletion  Muscle Mass (Malnutrition): mild depletion  Fluid Accumulation (Malnutrition): mild    Interventions/Recommendations (treatment strategy):  1.    -- TF  -- going for swallow study with SLP to assess possibility of pleasure oral feedings --> failed repeatedly    Prolonged QT interval  Qtc 526 [04/10/24]      limit Qtc prolonging drugs as able    Spastic hemiplegia of right dominant side as late effect of cerebrovascular disease  Important that patient is able to sit in chair for 4h for dialysis placement needs, even if she requires lift/assistance getting into the chair     This patient has Chronic right hemiplegia due to stroke. Physical therapy services has been scheduled. Continue all standard measures for pressure injury prevention and consult wound care for any wounds (chronic or acute).    ESRD (end stage renal disease) on dialysis  Creatine stable for now. BMP reviewed- noted Estimated Creatinine Clearance: 15.1 mL/min (A) (based on SCr of  5.4 mg/dL (H)). according to latest data. Based on current GFR, CKD stage is end stage.  Monitor UOP and serial BMP and adjust therapy as needed. Renally dose meds. Avoid nephrotoxic medications and procedures.    -- HD MWF  -- nephro following    Status post tracheostomy  Resolved, decannulated 4/30    Acute encephalopathy  Pt is non-verbal and does not follow commands at baseline. Vascular Neurology consulted given acute change from baseline. MRI with concern for evolution of prior strokes with noted differential of T2 hyperintensities including vasculitis vs demyelination. Discussed with Vascular Neurology; low concern for ongoing vasculitis/demyelination, likely represents typical evolution of prior infarcts.    Patient at baseline as of 4/22.     Plan  RESOLVED  - Repeat head imaging if concern of new change in mental status/focal deficit    Type 2 diabetes mellitus with hyperglycemia, with long-term current use of insulin  Adjusting insulin to account for diabetic TF    Patient's FSGs are controlled on current medication regimen.  Last A1c reviewed-   Lab Results   Component Value Date    HGBA1C 6.2 (H) 05/27/2024     Most recent fingerstick glucose reviewed-   Recent Labs   Lab 05/30/24  1557 05/30/24  2017   POCTGLUCOSE 197* 179*       Current correctional scale  Medium  Maintain anti-hyperglycemic dose as follows-   Antihyperglycemics (From admission, onward)      Start     Stop Route Frequency Ordered    05/27/24 1633  insulin aspart U-100 pen 0-5 Units         -- SubQ Before meals & nightly PRN 05/27/24 1533    05/25/24 2100  insulin glargine U-100 (Lantus) pen 10 Units         -- SubQ Nightly 05/25/24 1035          Hold Oral hypoglycemics while patient is in the hospital.  POCT glucose q6h    Hyponatremia  Patient has hyponatremia which is uncontrolled,We will aim to correct the sodium by 4-6mEq in 24 hours. We will monitor sodium Daily. The hyponatremia is due to ESRD. Discussed with nephrology,  dialysate bath adjusted to account for and correct hyponatremia.  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 Pt underwent extensive course, currently still healing from this, but no longer has wound vac. Pt's wound currently covered with bandage. Picture in media tabs    Plan  Wound care        VTE Risk Mitigation (From admission, onward)           Ordered     heparin (porcine) injection 5,000 Units  Every 8 hours         05/29/24 0823     heparin (porcine) injection 1,000 Units  As needed (PRN)         05/21/24 1017     heparin (porcine) injection 3,000 Units  As needed (PRN)         04/17/24 0825                    Discharge Planning   ZEKE: 6/7/2024     Code Status: Full Code   Is the patient medically ready for discharge?: No    Reason for patient still in hospital (select all that apply): Pending disposition  Discharge Plan A: New Nursing Home placement - detention care facility                  Jarrett Eason DO  Department of Hospital Medicine   [facility] - [facility]

## 2024-06-01 NOTE — PROGRESS NOTES
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    Hypermagnesemia 04/11/2024    POA, Mg 3.2  Daily chem       Morbid obesity     Necrotizing fasciitis        Social History     Socioeconomic History    Marital status: Single   Tobacco Use    Smoking status: Unknown     Social Determinants of Health     Financial Resource Strain: Patient Unable To Answer (3/14/2024)    Overall Financial Resource Strain (CARDIA)     Difficulty of Paying Living Expenses: Patient unable to answer   Recent Concern: Financial Resource Strain - High Risk (3/9/2024)    Overall Financial Resource Strain (CARDIA)     Difficulty of Paying Living Expenses: Very hard   Food Insecurity: Patient Unable To Answer (3/14/2024)    Hunger Vital Sign     Worried About Running Out of Food in the Last Year: Patient unable to answer     Ran Out of Food in the Last Year: Patient unable to answer   Transportation Needs: Patient Unable To Answer (3/14/2024)    PRAPARE - Transportation     Lack of Transportation (Medical): Patient unable to answer     Lack of Transportation (Non-Medical): Patient unable to answer   Physical Activity: Inactive (3/13/2024)    Exercise Vital Sign     Days of Exercise per Week: 0 days     Minutes of Exercise per Session: 0 min   Stress: Patient Unable To Answer (3/14/2024)    [employer] - Occupational Stress Questionnaire     Feeling of Stress : Patient unable to answer   Housing Stability: Patient Unable To Answer (3/14/2024)    Housing Stability Vital Sign     Unable to Pay for Housing in the Last Year: Patient unable to answer     Number of Places Lived in the Last Year: 1     Unstable Housing in the Last Year: Patient unable to answer   Recent Concern: Housing Stability - High  Risk (3/9/2024)    Housing Stability Vital Sign     Unable to Pay for Housing in the Last Year: Yes     Unstable Housing in the Last Year: No       Precautions:     Allergies as of 04/10/2024    (No Known Allergies)       WO Assessment Details/Treatment   Patient seen for FU assmt of PEG site. Last seen 5/25 for PEG site. Last skin assmt by IP skin integrity MARCOS 5/28.    Reviewed chart for this encounter.   See Flow Sheet for findings.     Pt is awake/ alert- nonverbal. Allowable for targeted assmt of PEG site at this time. PEG site peristomal skin is clear- with scar tissue present where indentations had initially been. The external bumper is taut but able to easily apply a thin foam dressing to redistribute pressure of pads to not cause further potential injury -or further marks to skin. Pt showed no s/s of discomfort at site- and tolerated care without distress noted.     RECOMMENDATIONS:  PEG site care every other day as follows: 1)clean with normal saline and pat dry. 2)cut a 4x4 Mepilex lite thin foam dressing in half and then cut again to main in form of drain sponge. 3)place beneath the external bumper at insertion site- no tape needed to secure.       Discussed POC with patient  See EMR for orders & patient education.    Bedside nursing to continue care & monitoring.  Bedside nursing to maintain pressure injury prevention interventions.  Current documented Tomas score is 14 with a nutrition sub- scale score of 3.     06/01/24 0850   WOCN Assessment   WOCN Total Time (mins) 30   Visit Date 06/01/24   Visit Time 0850   Consult Type Follow Up   WOCN Speciality Wound   Intervention assessed;changed;chart review   Teaching on-going  (pt- PEG site assmt- and dressing)   Positioning   Body Position position maintained   Head of Bed (HOB) Positioning HOB elevated   Positioning/Transfer Devices pillows   Pressure Injury Prevention    Check Moisture Management Pad Done     Photo taken for reference:   PEG  site.    Will continue to follow as needed and/ or as directed until discharge.    Niki Jacobs BSN, RN, CWOCN  06/01/2024

## 2024-06-02 LAB
POCT GLUCOSE: 162 MG/DL (ref 70–110)
POCT GLUCOSE: 170 MG/DL (ref 70–110)
POCT GLUCOSE: 177 MG/DL (ref 70–110)
POCT GLUCOSE: 206 MG/DL (ref 70–110)

## 2024-06-02 PROCEDURE — 25000003 PHARM REV CODE 250

## 2024-06-02 PROCEDURE — 20600001 HC STEP DOWN PRIVATE ROOM

## 2024-06-02 PROCEDURE — 63600175 PHARM REV CODE 636 W HCPCS

## 2024-06-02 PROCEDURE — 27000207 HC ISOLATION

## 2024-06-02 RX ADMIN — Medication 500 MG: at 09:06

## 2024-06-02 RX ADMIN — HEPARIN SODIUM 5000 UNITS: 5000 INJECTION INTRAVENOUS; SUBCUTANEOUS at 09:06

## 2024-06-02 RX ADMIN — ATORVASTATIN CALCIUM 40 MG: 40 TABLET, FILM COATED ORAL at 08:06

## 2024-06-02 RX ADMIN — HEPARIN SODIUM 5000 UNITS: 5000 INJECTION INTRAVENOUS; SUBCUTANEOUS at 05:06

## 2024-06-02 RX ADMIN — Medication 500 MG: at 08:06

## 2024-06-02 RX ADMIN — PANTOPRAZOLE SODIUM 40 MG: 40 GRANULE, DELAYED RELEASE ORAL at 08:06

## 2024-06-02 RX ADMIN — INSULIN GLARGINE 10 UNITS: 100 INJECTION, SOLUTION SUBCUTANEOUS at 09:06

## 2024-06-02 RX ADMIN — METOPROLOL TARTRATE 25 MG: 25 TABLET, FILM COATED ORAL at 08:06

## 2024-06-02 RX ADMIN — ZINC SULFATE 220 MG (50 MG) CAPSULE 220 MG: CAPSULE at 08:06

## 2024-06-02 RX ADMIN — HEPARIN SODIUM 5000 UNITS: 5000 INJECTION INTRAVENOUS; SUBCUTANEOUS at 03:06

## 2024-06-02 RX ADMIN — METOPROLOL TARTRATE 25 MG: 25 TABLET, FILM COATED ORAL at 09:06

## 2024-06-02 RX ADMIN — ASPIRIN 81 MG CHEWABLE TABLET 81 MG: 81 TABLET CHEWABLE at 08:06

## 2024-06-02 NOTE — SUBJECTIVE & OBJECTIVE
Interval History: naeon pending placement      Objective:     Vital Signs (Most Recent):  Temp: 98.7 °F (37.1 °C) (06/02/24 1127)  Pulse: 88 (06/02/24 1127)  Resp: 20 (06/02/24 1127)  BP: 127/70 (06/02/24 1127)  SpO2: 100 % (06/02/24 1127) Vital Signs (24h Range):  Temp:  [98 °F (36.7 °C)-98.7 °F (37.1 °C)] 98.7 °F (37.1 °C)  Pulse:  [86-98] 88  Resp:  [17-20] 20  SpO2:  [9 %-100 %] 100 %  BP: (116-131)/(70-84) 127/70     Weight: 105.5 kg (232 lb 9.4 oz)  Body mass index is 36.43 kg/m².  No intake or output data in the 24 hours ending 06/02/24 1127      Physical Exam  Vitals and nursing note reviewed.   Constitutional:       General: She is not in acute distress.     Appearance: She is not ill-appearing, toxic-appearing or diaphoretic.   HENT:      Head: Normocephalic and atraumatic.   Eyes:      General: No scleral icterus.        Right eye: No discharge.         Left eye: No discharge.      Conjunctiva/sclera: Conjunctivae normal.   Neck:      Comments: Decannulated  Cardiovascular:      Rate and Rhythm: Normal rate and regular rhythm.      Heart sounds: Normal heart sounds.   Pulmonary:      Effort: Pulmonary effort is normal. No respiratory distress.   Abdominal:      General: Abdomen is flat. There is no distension.      Tenderness: There is no abdominal tenderness.      Comments: PEG   Musculoskeletal:      Right lower leg: No edema.      Left lower leg: No edema.   Skin:     General: Skin is warm and dry.   Neurological:      General: No focal deficit present.      Mental Status: She is alert.      Comments: Nonverbal             Significant Labs: All pertinent labs within the past 24 hours have been reviewed.    Significant Imaging: I have reviewed all pertinent imaging results/findings within the past 24 hours.

## 2024-06-02 NOTE — PLAN OF CARE
Problem: Adult Inpatient Plan of Care  Goal: Plan of Care Review  Outcome: Progressing  Goal: Patient-Specific Goal (Individualized)  Outcome: Progressing  Goal: Optimal Comfort and Wellbeing  Outcome: Progressing  Goal: Readiness for Transition of Care  Outcome: Progressing     Problem: Bariatric Environmental Safety  Goal: Safety Maintained with Care  6/2/2024 0720 by Radha Starks RN  Outcome: Progressing  6/2/2024 0718 by Radha Starks RN  Outcome: Progressing     Problem: Device-Related Complication Risk (Hemodialysis)  Goal: Safe, Effective Therapy Delivery  6/2/2024 0720 by Radha Starks RN  Outcome: Progressing  6/2/2024 0718 by Radha Starks RN  Outcome: Progressing     Problem: Infection (Hemodialysis)  Goal: Absence of Infection Signs and Symptoms  Outcome: Progressing     Problem: Diabetes Comorbidity  Goal: Blood Glucose Level Within Targeted Range  Outcome: Progressing     Problem: Adjustment to Illness (Sepsis/Septic Shock)  Goal: Optimal Coping  Outcome: Progressing     Problem: Glycemic Control Impaired (Sepsis/Septic Shock)  Goal: Blood Glucose Level Within Desired Range  Outcome: Progressing     Problem: Infection Progression (Sepsis/Septic Shock)  Goal: Absence of Infection Signs and Symptoms  6/2/2024 0720 by Radha Starks RN  Outcome: Progressing  6/2/2024 0718 by Radha Starks RN  Outcome: Progressing     Problem: Fall Injury Risk  Goal: Absence of Fall and Fall-Related Injury  Outcome: Progressing

## 2024-06-02 NOTE — ASSESSMENT & PLAN NOTE
Adjusting insulin to account for diabetic TF    Patient's FSGs are controlled on current medication regimen.  Last A1c reviewed-   Lab Results   Component Value Date    HGBA1C 6.2 (H) 05/27/2024     Most recent fingerstick glucose reviewed-   Recent Labs   Lab 06/01/24  1553 06/01/24  2138 06/02/24  0813 06/02/24  1045   POCTGLUCOSE 170* 177* 206* 177*       Current correctional scale  Medium  Maintain anti-hyperglycemic dose as follows-   Antihyperglycemics (From admission, onward)    Start     Stop Route Frequency Ordered    05/27/24 1633  insulin aspart U-100 pen 0-5 Units         -- SubQ Before meals & nightly PRN 05/27/24 1533    05/25/24 2100  insulin glargine U-100 (Lantus) pen 10 Units         -- SubQ Nightly 05/25/24 1035        Hold Oral hypoglycemics while patient is in the hospital.  POCT glucose q6h

## 2024-06-02 NOTE — PROGRESS NOTES
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 History was given from patient's daughter. She was undergoing dialysis today and noticed she was lethargic, less alert than usual self. Pt completed dialysis and still not acting herself. EMS was called, fever of a 100.  On chart review, she did have an episode of large volume emesis around 1700.  Per EMS, she had a slightly elevated temp 100.0°F, glucose 300s.     In the ED: UA 2+ leuks, >100 WBC, many bacteria, WBC 17, CT abd/pelvis concerning for cystitis. Given vanc/zosyn    Overview/Hospital Course:  Patient was admitted to Hospital Medicine service for medical management and evaluation of urosepsis. Patient was continued on vanc/zosyn. Vascular Neurology consulted concerning mental status change with the recommendation to initiate ASA 81 QD and to obtain an MRI to r/o new stroke. Imaging pending. Nephrology was consulted for regularly scheduled dialysis. Afternoon of 4/12, patient was unable to tolerate filtration of volume during dialsis 2/2 hypotension. Patient received 500cc bolus and was transferred back to floor after finishing the session without volume removed. Will monitor for signs of volume overload. Tailoring insulin regimen while uptitrating tube feeds to goal rate. Staph Epi in all 4 bottles; ID with recommendation to continue Vanc & de-escalate meropenem to ertapenem on 04/15 through 4/16. Patient completed IV course of UTI coverage. Patient tolerated volume removal well in dialysis  throughout the rest of her hospital stay. Pending discharge to LTACH pending bed availability. Patient without BM with one episode of vomiting overnight 4/17. KUB with bowel gas and low concern for obstruction with no transition point noted. Escalating bowel regimen. Pending placement as of 4/22. Pulm consult placed to evaluate for possible trach downsize & eventual decannulation to assist placement if safe. Trach capping trial per pulm for 48h and will assess for decannulation on Monday. DVT studies negative. Pulm successfully decannulated pt 4/30, IR exchanged TDC. Pending swallow study with SLP and waiting for dispo options. Pt failed swallow study, placement pending. Working with PT on mobilize pt to sitting in a chair to expand placement options. Pt successfully mobilized using sandee lift but required 2 people to do so. Discussing dispo plan with family, likely d/c home if HD, DME needs able to be met. Pending acceptance at outpatient HD center. Ongoing placement difficulties d/t need for accepting HD facility to have sandee lift with 2 personnel to operate. Family meeting to discuss disposition options planned for Thursday 5/23 at 1 PM. Per family meeting referral sent to [facility], pending placement    Interval History: naeon pending placement      Objective:     Vital Signs (Most Recent):  Temp: 98.7 °F (37.1 °C) (06/02/24 1127)  Pulse: 88 (06/02/24 1127)  Resp: 20 (06/02/24 1127)  BP: 127/70 (06/02/24 1127)  SpO2: 100 % (06/02/24 1127) Vital Signs (24h Range):  Temp:  [98 °F (36.7 °C)-98.7 °F (37.1 °C)] 98.7 °F (37.1 °C)  Pulse:  [86-98] 88  Resp:  [17-20] 20  SpO2:  [9 %-100 %] 100 %  BP: (116-131)/(70-84) 127/70     Weight: 105.5 kg (232 lb 9.4 oz)  Body mass index is 36.43 kg/m².  No intake or output data in the 24 hours ending 06/02/24 1127      Physical Exam  Vitals and nursing note reviewed.   Constitutional:       General: She is not in acute distress.     Appearance: She is not ill-appearing,  toxic-appearing or diaphoretic.   HENT:      Head: Normocephalic and atraumatic.   Eyes:      General: No scleral icterus.        Right eye: No discharge.         Left eye: No discharge.      Conjunctiva/sclera: Conjunctivae normal.   Neck:      Comments: Decannulated  Cardiovascular:      Rate and Rhythm: Normal rate and regular rhythm.      Heart sounds: Normal heart sounds.   Pulmonary:      Effort: Pulmonary effort is normal. No respiratory distress.   Abdominal:      General: Abdomen is flat. There is no distension.      Tenderness: There is no abdominal tenderness.      Comments: PEG   Musculoskeletal:      Right lower leg: No edema.      Left lower leg: No edema.   Skin:     General: Skin is warm and dry.   Neurological:      General: No focal deficit present.      Mental Status: She is alert.      Comments: Nonverbal             Significant Labs: All pertinent labs within the past 24 hours have been reviewed.    Significant Imaging: I have reviewed all pertinent imaging results/findings within the past 24 hours.    Assessment/Plan:      * Acute cystitis with hematuria  resolved    Pt presenting with AMS and worsening lethargy. Pt is non-verbal at baseline, does not follow commands, but was less responsive than normal. Pt found to have a fever. Urinary source suspected based off of urine and CT abd/pelvis. Pt has many prior resistant bacterial infections including urinary sources. Has prior with sensitivity to zosyn and has also improved off ED dose of vanc/zosyn. Lactic elevated a 3.8->3.49 prior to completion of fluid bolus 500ml.    Patient with new fever and tachycardia on 4/25. Low threshold to initiate additional infectious workup and repeat UA.     Plan  S/p course of vanc/ertapenem per ID. Course completed 4/16.  Daily cbc  Contact precautions    *on contact precautions indefinitely while patient still makes urine given pt incontinent per infection control    Vulvovaginal candidiasis  Noted 5/29,  given 150mg fluconazole x1      Irritant contact dermatitis due friction or contact with other specified body fluids  Wound care      Debility  Needs:  Wheelchair: patient has a mobility limitation that significantly impairs her ability to participate in one or more mobility related activities of daily living (MRADL's) such as toileting, feeding, dressing, grooming, and bathing in customary locations in the home.  The mobility limitation cannot be sufficiently resolved by the use of a cane or walker.   The use of a manual wheelchair will significantly improve the patient's ability to participate in MRADLS and the patient will use it on regular basis in the home.  Family/pt has expressed her willingness to use a manual wheelchair in the home.  She also has a caregiver who is capable of assisting in mobility.    Mrs Saravia requires a hospital bed due to her requiring positioning of the body in ways not feasible with an ordinary bed to alleviate pain/ is completely immobile /or limited mobility and cannot independently make changes in body position without the use of the bed.  The positioning of the body cannot be sufficiently resolved by the use of pillows and wedges    Patient has a mobility limitation that significantly impairs their ability to participate in one or more mobility related activities of daily living, including toileting. This deficit can be resolved by using a bedside commode. Patient demonstrates mobility limitations that will cause them to be confined to one room at home without bathroom access for up to 30 days. Using a bedside commode will greatly improve the patient's ability to participate in MRADLs       Complication of vascular dialysis catheter  Cath intermittently clogging, not resolving with cath-hedy, going for IR venogram 4/30 with possible exchange. S/p exchange with IR on 4/30      Constipation  RESOLVED with Bowel regimen  Patient without BM x5d. One episode of vomitus night of 4/17.  Some concern for bowel obstruction. Tolerating continuous tube feeds at goal rate with 0cc on residuals. Physical exam with bowel sounds, soft nontender abdomen. KUB without concern for obstruction with bowel gas, lack of transition point.    Patient now with regular bowel movements on bowel regimen.     Plan  - Miralax BID, Senna BID  - compazine PRN    Moderate malnutrition  Nutrition consulted. Most recent weight and BMI monitored-     Measurements:  Wt Readings from Last 1 Encounters:   05/29/24 105.5 kg (232 lb 9.4 oz)   Body mass index is 36.43 kg/m².    Patient has been screened and assessed by RD.    Malnutrition Type:  Context: acute illness or injury  Level: moderate    Malnutrition Characteristic Summary:  Weight Loss (Malnutrition): 10% in 6 months  Subcutaneous Fat (Malnutrition): mild depletion  Muscle Mass (Malnutrition): mild depletion  Fluid Accumulation (Malnutrition): mild    Interventions/Recommendations (treatment strategy):  1.    -- TF  -- going for swallow study with SLP to assess possibility of pleasure oral feedings --> failed repeatedly    Prolonged QT interval  Qtc 526 [04/10/24]      limit Qtc prolonging drugs as able    Spastic hemiplegia of right dominant side as late effect of cerebrovascular disease  Important that patient is able to sit in chair for 4h for dialysis placement needs, even if she requires lift/assistance getting into the chair     This patient has Chronic right hemiplegia due to stroke. Physical therapy services has been scheduled. Continue all standard measures for pressure injury prevention and consult wound care for any wounds (chronic or acute).    ESRD (end stage renal disease) on dialysis  Creatine stable for now. BMP reviewed- noted Estimated Creatinine Clearance: 15.1 mL/min (A) (based on SCr of 5.4 mg/dL (H)). according to latest data. Based on current GFR, CKD stage is end stage.  Monitor UOP and serial BMP and adjust therapy as needed. Renally dose meds.  "Avoid nephrotoxic medications and procedures.    -- HD MWF  -- nephro following    Status post tracheostomy  Resolved, decannulated 4/30    Acute encephalopathy  Pt is non-verbal and does not follow commands at baseline. Vascular Neurology consulted given acute change from baseline. MRI with concern for evolution of prior strokes with noted differential of T2 hyperintensities including vasculitis vs demyelination. Discussed with Vascular Neurology; low concern for ongoing vasculitis/demyelination, likely represents typical evolution of prior infarcts.    Patient at baseline as of 4/22.     Plan  RESOLVED  - Repeat head imaging if concern of new change in mental status/focal deficit    Type 2 diabetes mellitus with hyperglycemia, with long-term current use of insulin  Adjusting insulin to account for diabetic TF    Patient's FSGs are controlled on current medication regimen.  Last A1c reviewed-   Lab Results   Component Value Date    HGBA1C 6.2 (H) 05/27/2024     Most recent fingerstick glucose reviewed-   Recent Labs   Lab 06/01/24  1553 06/01/24  2138 06/02/24  0813 06/02/24  1045   POCTGLUCOSE 170* 177* 206* 177*       Current correctional scale  Medium  Maintain anti-hyperglycemic dose as follows-   Antihyperglycemics (From admission, onward)      Start     Stop Route Frequency Ordered    05/27/24 1633  insulin aspart U-100 pen 0-5 Units         -- SubQ Before meals & nightly PRN 05/27/24 1533    05/25/24 2100  insulin glargine U-100 (Lantus) pen 10 Units         -- SubQ Nightly 05/25/24 1035          Hold Oral hypoglycemics while patient is in the hospital.  POCT glucose q6h    Hyponatremia  Patient has hyponatremia which is uncontrolled,We will aim to correct the sodium by 4-6mEq in 24 hours. We will monitor sodium Daily. The hyponatremia is due to ESRD. Discussed with nephrology, dialysate bath adjusted to account for and correct hyponatremia.  No results for input(s): "NA" in the last 24 hours.  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 Pt underwent extensive course, currently still healing from this, but no longer has wound vac. Pt's wound currently covered with bandage. Picture in media tabs    Plan  Wound care        VTE Risk Mitigation (From admission, onward)           Ordered     heparin (porcine) injection 5,000 Units  Every 8 hours         05/29/24 0823     heparin (porcine) injection 1,000 Units  As needed (PRN)         05/21/24 1017     heparin (porcine) injection 3,000 Units  As needed (PRN)         04/17/24 0825                    Discharge Planning   ZEKE: 6/7/2024     Code Status: Full Code   Is the patient medically ready for discharge?: No    Reason for patient still in hospital (select all that apply): Patient trending condition  Discharge Plan A: New Nursing Home placement - [facility] care facility                  Osito Dos Santos MD  Department of Hospital Medicine   [facility] - Telemetry Stepdown

## 2024-06-02 NOTE — PLAN OF CARE
Problem: Infection  Goal: Absence of Infection Signs and Symptoms  Outcome: Progressing     Problem: Adult Inpatient Plan of Care  Goal: Patient-Specific Goal (Individualized)  Outcome: Progressing     Problem: Bariatric Environmental Safety  Goal: Safety Maintained with Care  Outcome: Progressing     Problem: Diabetes Comorbidity  Goal: Blood Glucose Level Within Targeted Range  Outcome: Progressing     Problem: Glycemic Control Impaired (Sepsis/Septic Shock)  Goal: Blood Glucose Level Within Desired Range  Outcome: Progressing     Problem: Infection Progression (Sepsis/Septic Shock)  Goal: Absence of Infection Signs and Symptoms  Outcome: Progressing

## 2024-06-03 LAB
ALBUMIN SERPL BCP-MCNC: 3.6 G/DL (ref 3.5–5.2)
ANION GAP SERPL CALC-SCNC: 14 MMOL/L (ref 8–16)
BUN SERPL-MCNC: 25 MG/DL (ref 6–20)
CALCIUM SERPL-MCNC: 9.6 MG/DL (ref 8.7–10.5)
CHLORIDE SERPL-SCNC: 98 MMOL/L (ref 95–110)
CO2 SERPL-SCNC: 22 MMOL/L (ref 23–29)
CREAT SERPL-MCNC: 3.9 MG/DL (ref 0.5–1.4)
EST. GFR  (NO RACE VARIABLE): 13.2 ML/MIN/1.73 M^2
GLUCOSE SERPL-MCNC: 131 MG/DL (ref 70–110)
PHOSPHATE SERPL-MCNC: 2 MG/DL (ref 2.7–4.5)
POCT GLUCOSE: 168 MG/DL (ref 70–110)
POCT GLUCOSE: 177 MG/DL (ref 70–110)
POCT GLUCOSE: 179 MG/DL (ref 70–110)
POCT GLUCOSE: 180 MG/DL (ref 70–110)
POTASSIUM SERPL-SCNC: 4.1 MMOL/L (ref 3.5–5.1)
SODIUM SERPL-SCNC: 134 MMOL/L (ref 136–145)

## 2024-06-03 PROCEDURE — 92507 TX SP LANG VOICE COMM INDIV: CPT

## 2024-06-03 PROCEDURE — 99232 SBSQ HOSP IP/OBS MODERATE 35: CPT | Mod: ,,, | Performed by: NURSE PRACTITIONER

## 2024-06-03 PROCEDURE — 80069 RENAL FUNCTION PANEL: CPT

## 2024-06-03 PROCEDURE — 97530 THERAPEUTIC ACTIVITIES: CPT | Mod: CQ

## 2024-06-03 PROCEDURE — 80100014 HC HEMODIALYSIS 1:1

## 2024-06-03 PROCEDURE — 36415 COLL VENOUS BLD VENIPUNCTURE: CPT

## 2024-06-03 PROCEDURE — 63600175 PHARM REV CODE 636 W HCPCS: Performed by: NURSE PRACTITIONER

## 2024-06-03 PROCEDURE — 92526 ORAL FUNCTION THERAPY: CPT

## 2024-06-03 PROCEDURE — 20600001 HC STEP DOWN PRIVATE ROOM

## 2024-06-03 PROCEDURE — 27000207 HC ISOLATION

## 2024-06-03 PROCEDURE — 63600175 PHARM REV CODE 636 W HCPCS

## 2024-06-03 PROCEDURE — 25000003 PHARM REV CODE 250

## 2024-06-03 RX ADMIN — METOPROLOL TARTRATE 25 MG: 25 TABLET, FILM COATED ORAL at 10:06

## 2024-06-03 RX ADMIN — ATORVASTATIN CALCIUM 40 MG: 40 TABLET, FILM COATED ORAL at 10:06

## 2024-06-03 RX ADMIN — Medication 500 MG: at 09:06

## 2024-06-03 RX ADMIN — PANTOPRAZOLE SODIUM 40 MG: 40 GRANULE, DELAYED RELEASE ORAL at 10:06

## 2024-06-03 RX ADMIN — HEPARIN SODIUM 3000 UNITS: 1000 INJECTION, SOLUTION INTRAVENOUS; SUBCUTANEOUS at 02:06

## 2024-06-03 RX ADMIN — HEPARIN SODIUM 5000 UNITS: 5000 INJECTION INTRAVENOUS; SUBCUTANEOUS at 09:06

## 2024-06-03 RX ADMIN — INSULIN GLARGINE 10 UNITS: 100 INJECTION, SOLUTION SUBCUTANEOUS at 09:06

## 2024-06-03 RX ADMIN — METOPROLOL TARTRATE 25 MG: 25 TABLET, FILM COATED ORAL at 09:06

## 2024-06-03 RX ADMIN — Medication 500 MG: at 10:06

## 2024-06-03 RX ADMIN — HEPARIN SODIUM 5000 UNITS: 5000 INJECTION INTRAVENOUS; SUBCUTANEOUS at 02:06

## 2024-06-03 RX ADMIN — HEPARIN SODIUM 5000 UNITS: 5000 INJECTION INTRAVENOUS; SUBCUTANEOUS at 05:06

## 2024-06-03 RX ADMIN — ASPIRIN 81 MG CHEWABLE TABLET 81 MG: 81 TABLET CHEWABLE at 10:06

## 2024-06-03 NOTE — ASSESSMENT & PLAN NOTE
Pt is non-verbal and does not follow commands at baseline. Vascular Neurology consulted given acute change from baseline. MRI with concern for evolution of prior strokes with noted differential of T2 hyperintensities including vasculitis vs demyelination. Discussed with Vascular Neurology; low concern for ongoing vasculitis/demyelination, likely represents typical evolution of prior infarcts.    Patient at baseline as of 4/22.     Resolved

## 2024-06-03 NOTE — SUBJECTIVE & OBJECTIVE
Interval History: HD overnight, tolerated well. Net UF 1L. Pending placement.     Review of patient's allergies indicates:  No Known Allergies  Current Facility-Administered Medications   Medication Frequency    acetaminophen oral solution 650 mg Q6H PRN    ascorbic acid (vitamin C) tablet 500 mg BID    aspirin chewable tablet 81 mg Daily    atorvastatin tablet 40 mg Daily    dextrose 10 % infusion Continuous PRN    dextrose 10% bolus 125 mL 125 mL PRN    dextrose 10% bolus 250 mL 250 mL PRN    epoetin merry injection 6,100 Units Every Mon, Wed, Fri    glucagon (human recombinant) injection 1 mg PRN    glucose chewable tablet 16 g PRN    glucose chewable tablet 24 g PRN    heparin (porcine) injection 1,000 Units PRN    heparin (porcine) injection 3,000 Units PRN    heparin (porcine) injection 5,000 Units Q8H    hepatitis B virus vacc.rec(PF) injection 20 mcg vaccine x 1 dose    insulin aspart U-100 pen 0-5 Units QID (AC + HS) PRN    insulin glargine U-100 (Lantus) pen 10 Units QHS    metoprolol tartrate (LOPRESSOR) tablet 25 mg BID    ondansetron 4 mg/5 mL solution 4 mg Q6H PRN    pantoprazole suspension 40 mg Daily    sodium chloride 0.9% bolus 250 mL 250 mL PRN    sodium chloride 0.9% flush 10 mL Q12H PRN       Objective:     Vital Signs (Most Recent):  Temp: 99.4 °F (37.4 °C) (06/03/24 0744)  Pulse: 105 (06/03/24 0744)  Resp: 18 (06/03/24 0744)  BP: 123/87 (06/03/24 0744)  SpO2: 100 % (06/03/24 0900) Vital Signs (24h Range):  Temp:  [97.8 °F (36.6 °C)-99.4 °F (37.4 °C)] 99.4 °F (37.4 °C)  Pulse:  [] 105  Resp:  [18-20] 18  SpO2:  [95 %-100 %] 100 %  BP: (100-151)/(51-87) 123/87     Weight: 105.5 kg (232 lb 9.4 oz) (05/29/24 1059)  Body mass index is 36.43 kg/m².  Body surface area is 2.23 meters squared.    I/O last 3 completed shifts:  In: -   Out: 3000 [Other:3000]     Physical Exam  Vitals and nursing note reviewed.   Cardiovascular:      Rate and Rhythm: Normal rate.   Pulmonary:      Effort: Pulmonary  "effort is normal.   Neurological:      Mental Status: She is alert.        Significant Labs:  CBC: No results for input(s): "WBC", "RBC", "HGB", "HCT", "PLT", "MCV", "MCH", "MCHC" in the last 168 hours.  CMP:   Recent Labs   Lab 06/03/24  0411   *   CALCIUM 9.6   ALBUMIN 3.6   *   K 4.1   CO2 22*   CL 98   BUN 25*   CREATININE 3.9*     All labs within the past 24 hours have been reviewed.       "

## 2024-06-03 NOTE — PT/OT/SLP PROGRESS
Physical Therapy Treatment    Patient Name:  Sarah Saravia   MRN:  [id number]    Recommendations:     Discharge Recommendations: Moderate Intensity Therapy  Discharge Equipment Recommendations: hospital bed, lift device, wheelchair  Barriers to discharge: Pt requiring increased skilled assistance at current time.    Assessment:     Sarah Saravia is a 53 y.o. female admitted with a medical diagnosis of Acute cystitis with hematuria.  She presents with the following impairments/functional limitations: weakness, impaired endurance, impaired self care skills, impaired functional mobility, impaired balance, impaired cognition, decreased coordination, decreased upper extremity function, decreased lower extremity function, decreased safety awareness, decreased ROM, impaired skin requiring max/total assistance of 2 helpers and verbal cues for bed mob, scooting to EOB/HOB due to weakness, limited ROM, cognitive deficits.   In light of pt's current functional level and deficits, it is anticipated that pt will need to participate in a moderate intensity rehab program consisting of PT and OT in order to achieve full rehab potential to return to previous level of function and roles.  Pt remains motivated to participate in PT session and will cont to benefit from skilled PT intervention..    Rehab Prognosis: Good; patient would benefit from acute skilled PT services to address these deficits and reach maximum level of function.    Recent Surgery: * No surgery found *      Plan:     During this hospitalization, patient to be seen 3 x/week to address the identified rehab impairments via gait training, therapeutic activities, therapeutic exercises, neuromuscular re-education and progress toward the following goals:    Plan of Care Expires:  06/22/24    Subjective     Chief Complaint: weakness, fatigue  Pain/Comfort:  Pain Rating 1: 0/10  Pain Rating Post-Intervention 1: 0/10      Objective:     2 attempts for tx session due to Pt  being cleaned up at 12:43 pm    Communicated with nurse (Araceli) prior to session.  Patient found HOB elevated at 33* angle with PEG Tube (no family present) upon PT entry to room.     General Precautions: Standard, aphasia, aspiration, fall, NPO  Orthopedic Precautions: N/A  Braces: N/A  Respiratory Status: Room air     Functional Mobility:  Bed Mobility:     Rolling Left:  total assistance of 2 via drawsheet  Rolling Right: total assistance of 2 via drawsheet  Scooting: anteriorly to the EOB with max A of 2; to HOB via drawsheet with dependence of 2 helpers x2 scoots  Supine to Sit: max/total A of 2 via drawsheet, exiting on the R side, HOB at 30* angle  Sit to Supine: total A of 2, HOB flat  Transfers:     Sit to Stand:   attempted from EOB, however, pt does not offer any effort  with no AD  Balance: static sitting at the EOB with close sup; dynamic sitting (reaching with UE's) with SBA and tactile cues.  Total sitting time 15 min      AM-PAC 6 CLICK MOBILITY  Turning over in bed (including adjusting bedclothes, sheets and blankets)?: 2  Sitting down on and standing up from a chair with arms (e.g., wheelchair, bedside commode, etc.): 1  Moving from lying on back to sitting on the side of the bed?: 2  Moving to and from a bed to a chair (including a wheelchair)?: 1  Need to walk in hospital room?: 1  Climbing 3-5 steps with a railing?: 1  Basic Mobility Total Score: 8       Treatment & Education:  Patient provided with daily orientation and goals of this PT session. They were educated to call for assistance and to transfer with hospital staff only.  Also, pt was educated on the effects of prolonged immobility and the importance of performing OOB activity and exercises to promote healing and reduce recovery time    Patient left  sup in bed with HOB at 30* angle  with all lines intact and call button in reach..    GOALS:   Multidisciplinary Problems       Physical Therapy Goals          Problem: Physical Therapy     Goal Priority Disciplines Outcome Goal Variances Interventions   Physical Therapy Goal     PT, PT/OT Progressing     Description: Goals to be met by: 24   Goals remain appropriate 5/3/2024 to be met by 24  Goals updated 2024 to be met by 24  Goals updated 24 to be met by 24    Patient will increase functional independence with mobility by performin. Supine to sit with Maximum Assistance  2. Sit to supine with Maximum Assistance  3. Rolling to Left and Right with Moderate Assistance.  4. Sitting at edge of bed 5 minutes with Moderate Assistance- MET , monitor consistency  4a. Sitting at edge of bed 10 minutes with Supervision  5. Lower extremity exercise program x20 reps per handout, with assistance as needed  6. Sit to stand transfer with Maximum Assistance with or without LRAD  7. Stand for 2 minutes with Maximum Assistance with or without LRAD                         Time Tracking:     PT Received On: 24  PT Start Time: 1323     PT Stop Time: 1410  PT Total Time (min): 47 min     Billable Minutes: Therapeutic Activity 47    Treatment Type: Treatment  PT/PTA: PTA     Number of PTA visits since last PT visit: 2024

## 2024-06-03 NOTE — ASSESSMENT & PLAN NOTE
Patient has hyponatremia which is uncontrolled,We will aim to correct the sodium by 4-6mEq in 24 hours. We will monitor sodium Daily. The hyponatremia is due to ESRD. Discussed with nephrology, dialysate bath adjusted to account for and correct hyponatremia.  Recent Labs   Lab 06/03/24  0411   *     IMPROVING  - Daily morning CMP  - Continue to montior

## 2024-06-03 NOTE — PLAN OF CARE
Problem: Infection  Goal: Absence of Infection Signs and Symptoms  6/3/2024 1812 by Beth Buitrago RN  Outcome: Progressing  6/3/2024 1025 by Beth Buitrago RN  Outcome: Progressing     Problem: Skin Injury Risk Increased  Goal: Skin Health and Integrity  6/3/2024 1812 by Beth Buitrago RN  Outcome: Progressing  6/3/2024 1025 by Beth Buitrago RN  Outcome: Progressing     Problem: Adult Inpatient Plan of Care  Goal: Plan of Care Review  6/3/2024 1812 by Beth Buitrago RN  Outcome: Progressing  6/3/2024 1025 by Beth Buitrago RN  Outcome: Progressing

## 2024-06-03 NOTE — ASSESSMENT & PLAN NOTE
53 y.o. Black or  Female ESRD-HD M-W-F at [facility] presents to ED on 4/10/2024 with UTI.    Of note, the patient was initiated on RRT in February 2024 after being admitted with ALVARADO 2/2 iATN due to septic shock. Perm cath placed on 2/29/24. She was transferred to [facility] on 3/12. Deemed ESRD at [facility].  Nephrology consulted for inpatient ESRD-HD management    Assessment:     - Continue MWF iHD schedule while IP.   - Continue to monitor intake and output  - Please avoid gadolinium, fleets, phos-based laxatives, NSAIDs  - Dialysis thrice weekly unless more urgent indications arise. Will evaluate RRT requirements Daily.      Lab Results   Component Value Date    FESATURATED 10 (L) 03/15/2024    FERRITIN 414 (H) 03/15/2024       - Goal in ESRD is Hgb of 10-11. Continue EPO.      Secondary hyperparathyroid of renal origin   - phos 2.0 - no indication for phos binders

## 2024-06-03 NOTE — SUBJECTIVE & OBJECTIVE
Interval History: 1L removed from dialysis. SW onboard for [facility] NH placement once patient gets accepted at [facility] with family support.      Review of Systems   Unable to perform ROS: Patient nonverbal     Objective:     Vital Signs (Most Recent):  Temp: 99.4 °F (37.4 °C) (06/03/24 0744)  Pulse: 105 (06/03/24 0744)  Resp: 18 (06/03/24 0744)  BP: 123/87 (06/03/24 0744)  SpO2: 100 % (06/03/24 0900) Vital Signs (24h Range):  Temp:  [97.8 °F (36.6 °C)-99.4 °F (37.4 °C)] 99.4 °F (37.4 °C)  Pulse:  [] 105  Resp:  [18-20] 18  SpO2:  [95 %-100 %] 100 %  BP: (100-151)/(51-87) 123/87     Weight: 105.5 kg (232 lb 9.4 oz)  Body mass index is 36.43 kg/m².    Intake/Output Summary (Last 24 hours) at 6/3/2024 1025  Last data filed at 6/3/2024 0800  Gross per 24 hour   Intake 200 ml   Output 3000 ml   Net -2800 ml         Physical Exam  Vitals and nursing note reviewed.   Constitutional:       General: She is not in acute distress.     Appearance: She is not ill-appearing, toxic-appearing or diaphoretic.   HENT:      Head: Normocephalic and atraumatic.   Eyes:      General: No scleral icterus.        Right eye: No discharge.         Left eye: No discharge.      Conjunctiva/sclera: Conjunctivae normal.   Neck:      Comments: Decannulated  Cardiovascular:      Rate and Rhythm: Normal rate and regular rhythm.      Heart sounds: Normal heart sounds.   Pulmonary:      Effort: Pulmonary effort is normal. No respiratory distress.   Abdominal:      General: Abdomen is flat. There is no distension.      Tenderness: There is no abdominal tenderness.      Comments: PEG without signs of inflammation/bleeding     Musculoskeletal:      Right lower leg: No edema.      Left lower leg: No edema.   Skin:     General: Skin is warm and dry.   Neurological:      General: No focal deficit present.      Mental Status: She is alert.      Comments: Nonverbal             Significant Labs: All pertinent labs within the past 24 hours have been  reviewed.    Significant Imaging: I have reviewed all pertinent imaging results/findings within the past 24 hours.

## 2024-06-03 NOTE — PLAN OF CARE
"PT alert and oriented to self. PT was able to verbalize "feeling good" She also said "hello".  She had dialysis this shift for 2.5 hrs with 1 liter of fluid removed. She has remained free of falls and injuries. Safety eduction and plan of care reviewed with PT. Bed is low and locked with call light with in easy reach.  Bed alarm set.   "

## 2024-06-03 NOTE — PROGRESS NOTES
06/03/24 0451   Vital Signs   Pulse 97   Resp 18   SpO2 100 %   /65   During Hemodialysis Assessment   Blood Flow Rate (mL/min) 400 mL/min   Dialysate Flow Rate (mL/min) 800 ml/min   Ultrafiltration Rate (mL/Hr) 710 mL/Hr   Arteriovenous Lines Secure Yes   Arterial Pressure (mmHg) 180 mmHg   Venous Pressure (mmHg) 140   UF Removed (mL) 1500 mL   TMP 0   Venous Line in Air Detector Yes   Transducer Dry Yes   Access Visible Yes   Heparin given? Yes, see MAR   Post-Hemodialysis Assessment   Rinseback Volume (mL) 250 mL   Dialyzer Clearance Clear   Duration of Treatment 2.5 minutes   Total UF (mL) 1500 mL   Net Fluid Removal 1000   Patient Response to Treatment tolerated well   Post-Hemodialysis Comments hd completed     Hd tx ended. Per Dr. Cat to run pt 2.5 hours, tolerated tx well, deaccessed, dressed intact/clean and dry

## 2024-06-03 NOTE — ASSESSMENT & PLAN NOTE
Nutrition consulted. Most recent weight and BMI monitored-     Measurements:  Wt Readings from Last 1 Encounters:   05/29/24 105.5 kg (232 lb 9.4 oz)   Body mass index is 36.43 kg/m².    Patient has been screened and assessed by RD.    Malnutrition Type:  Context: acute illness or injury  Level: moderate    Malnutrition Characteristic Summary:  Weight Loss (Malnutrition): 10% in 6 months  Subcutaneous Fat (Malnutrition): mild depletion  Muscle Mass (Malnutrition): mild depletion  Fluid Accumulation (Malnutrition): mild    Interventions/Recommendations (treatment strategy):  1.    - on tube feeds   - previous history of failed swallow studies

## 2024-06-03 NOTE — PROGRESS NOTES
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 History was given from patient's daughter. She was undergoing dialysis today and noticed she was lethargic, less alert than usual self. Pt completed dialysis and still not acting herself. EMS was called, fever of a 100.  On chart review, she did have an episode of large volume emesis around 1700.  Per EMS, she had a slightly elevated temp 100.0°F, glucose 300s.     In the ED: UA 2+ leuks, >100 WBC, many bacteria, WBC 17, CT abd/pelvis concerning for cystitis. Given vanc/zosyn    Overview/Hospital Course:  Patient was admitted to Hospital Medicine service for medical management and evaluation of urosepsis. Patient was continued on vanc/zosyn. Vascular Neurology consulted concerning mental status change with the recommendation to initiate ASA 81 QD and to obtain an MRI to r/o new stroke. Imaging pending. Nephrology was consulted for regularly scheduled dialysis. Afternoon of 4/12, patient was unable to tolerate filtration of volume during dialsis 2/2 hypotension. Patient received 500cc bolus and was transferred back to floor after finishing the session without volume removed. Will monitor for signs of volume overload. Tailoring insulin regimen while uptitrating tube feeds to goal rate. Staph Epi in all 4 bottles; ID with recommendation to continue Vanc & de-escalate meropenem to ertapenem on 04/15 through 4/16. Patient completed IV course of UTI coverage. Patient tolerated volume removal well in dialysis  throughout the rest of her hospital stay. Pending discharge to [facility] pending bed availability. Patient without BM with one episode of vomiting overnight 4/17. KUB with bowel gas and low concern for obstruction with no transition point noted. Escalating bowel regimen. Pending placement as of 4/22. Pulm consult placed to evaluate for possible trach downsize & eventual decannulation to assist placement if safe. Trach capping trial per pulm for 48h and will assess for decannulation on Monday. DVT studies negative. Pulm successfully decannulated pt 4/30, IR exchanged TDC. Pending swallow study with SLP and waiting for dispo options. Pt failed swallow study, placement pending. Working with PT on mobilize pt to sitting in a chair to expand placement options. Pt successfully mobilized using sandee lift but required 2 people to do so. Discussing dispo plan with family, likely d/c home if HD, DME needs able to be met. Pending acceptance at outpatient HD center. Ongoing placement difficulties d/t need for accepting HD facility to have sandee lift with 2 personnel to operate. Family meeting to discuss disposition options planned for Thursday 5/23 at 1 PM. SW onboard for placement to [facility] to continue dialysis. Once patient gets accepted at [facility], next step will be to work with daughter on longterm NH placement.     Interval History: 1L removed from dialysis. SW onboard for longterm NH placement once patient gets accepted at [facility] with family support.      Review of Systems   Unable to perform ROS: Patient nonverbal     Objective:     Vital Signs (Most Recent):  Temp: 99.4 °F (37.4 °C) (06/03/24 0744)  Pulse: 105 (06/03/24 0744)  Resp: 18 (06/03/24 0744)  BP: 123/87 (06/03/24 0744)  SpO2: 100 % (06/03/24 0900) Vital Signs (24h Range):  Temp:  [97.8 °F (36.6 °C)-99.4 °F (37.4 °C)] 99.4 °F (37.4 °C)  Pulse:  [] 105  Resp:  [18-20] 18  SpO2:  [95 %-100 %] 100 %  BP: (100-151)/(51-87) 123/87     Weight:  105.5 kg (232 lb 9.4 oz)  Body mass index is 36.43 kg/m².    Intake/Output Summary (Last 24 hours) at 6/3/2024 1025  Last data filed at 6/3/2024 0800  Gross per 24 hour   Intake 200 ml   Output 3000 ml   Net -2800 ml         Physical Exam  Vitals and nursing note reviewed.   Constitutional:       General: She is not in acute distress.     Appearance: She is not ill-appearing, toxic-appearing or diaphoretic.   HENT:      Head: Normocephalic and atraumatic.   Eyes:      General: No scleral icterus.        Right eye: No discharge.         Left eye: No discharge.      Conjunctiva/sclera: Conjunctivae normal.   Neck:      Comments: Decannulated  Cardiovascular:      Rate and Rhythm: Normal rate and regular rhythm.      Heart sounds: Normal heart sounds.   Pulmonary:      Effort: Pulmonary effort is normal. No respiratory distress.   Abdominal:      General: Abdomen is flat. There is no distension.      Tenderness: There is no abdominal tenderness.      Comments: PEG without signs of inflammation/bleeding     Musculoskeletal:      Right lower leg: No edema.      Left lower leg: No edema.   Skin:     General: Skin is warm and dry.   Neurological:      General: No focal deficit present.      Mental Status: She is alert.      Comments: Nonverbal             Significant Labs: All pertinent labs within the past 24 hours have been reviewed.    Significant Imaging: I have reviewed all pertinent imaging results/findings within the past 24 hours.    Assessment/Plan:      * Acute cystitis with hematuria  Resolved     Pt presenting with AMS and worsening lethargy. Pt is non-verbal at baseline, does not follow commands, but was less responsive than normal. Pt found to have a fever. Urinary source suspected based off of urine and CT abd/pelvis. Pt has many prior resistant bacterial infections including urinary sources. Has prior with sensitivity to zosyn and has also improved off ED dose of vanc/zosyn. Lactic elevated a 3.8->3.49  prior to completion of fluid bolus 500ml.    Patient with new fever and tachycardia on 4/25. Low threshold to initiate additional infectious workup and repeat UA.     Plan  S/p course of vanc/ertapenem per ID. Course completed 4/16.  Daily cbc  Contact precautions    *on contact precautions indefinitely while patient still makes urine given pt incontinent per infection control    Vulvovaginal candidiasis  Noted 5/29, given 150mg fluconazole x1      Irritant contact dermatitis due friction or contact with other specified body fluids  Wound care following       Debility  Needs:  Wheelchair: patient has a mobility limitation that significantly impairs her ability to participate in one or more mobility related activities of daily living (MRADL's) such as toileting, feeding, dressing, grooming, and bathing in customary locations in the home.  The mobility limitation cannot be sufficiently resolved by the use of a cane or walker.   The use of a manual wheelchair will significantly improve the patient's ability to participate in MRADLS and the patient will use it on regular basis in the home.  Family/pt has expressed her willingness to use a manual wheelchair in the home.  She also has a caregiver who is capable of assisting in mobility.    Mrs Saravia requires a hospital bed due to her requiring positioning of the body in ways not feasible with an ordinary bed to alleviate pain/ is completely immobile /or limited mobility and cannot independently make changes in body position without the use of the bed.  The positioning of the body cannot be sufficiently resolved by the use of pillows and wedges    Patient has a mobility limitation that significantly impairs their ability to participate in one or more mobility related activities of daily living, including toileting. This deficit can be resolved by using a bedside commode. Patient demonstrates mobility limitations that will cause them to be confined to one room at home  without bathroom access for up to 30 days. Using a bedside commode will greatly improve the patient's ability to participate in MRADLs       Complication of vascular dialysis catheter  Cath intermittently clogging, not resolving with cath-hedy, going for IR venogram 4/30 with possible exchange. S/p exchange with IR on 4/30      Constipation  Resolved     Moderate malnutrition  Nutrition consulted. Most recent weight and BMI monitored-     Measurements:  Wt Readings from Last 1 Encounters:   05/29/24 105.5 kg (232 lb 9.4 oz)   Body mass index is 36.43 kg/m².    Patient has been screened and assessed by RD.    Malnutrition Type:  Context: acute illness or injury  Level: moderate    Malnutrition Characteristic Summary:  Weight Loss (Malnutrition): 10% in 6 months  Subcutaneous Fat (Malnutrition): mild depletion  Muscle Mass (Malnutrition): mild depletion  Fluid Accumulation (Malnutrition): mild    Interventions/Recommendations (treatment strategy):  1.    - on tube feeds   - previous history of failed swallow studies    Prolonged QT interval  Qtc 526 [04/10/24]      limit Qtc prolonging drugs as able    Spastic hemiplegia of right dominant side as late effect of cerebrovascular disease  Important that patient is able to sit in chair for 4h for dialysis placement needs, even if she requires lift/assistance getting into the chair     This patient has Chronic right hemiplegia due to stroke. Physical therapy services has been scheduled. Continue all standard measures for pressure injury prevention and consult wound care for any wounds (chronic or acute).    ESRD (end stage renal disease) on dialysis  Creatine stable for now. BMP reviewed- noted Estimated Creatinine Clearance: 20.9 mL/min (A) (based on SCr of 3.9 mg/dL (H)). according to latest data. Based on current GFR, CKD stage is end stage.  Monitor UOP and serial BMP and adjust therapy as needed. Renally dose meds. Avoid nephrotoxic medications and procedures.    SW  onboard for placement to [facility] to continue dialysis. Once patient gets accepted at [facility], next step will be to work with daughter on halfway NH placement.     -- HD MWF (~1L fluid removal on average per session)  -- nephro following    Status post tracheostomy  Resolved, decannulated 4/30    Acute encephalopathy  Pt is non-verbal and does not follow commands at baseline. Vascular Neurology consulted given acute change from baseline. MRI with concern for evolution of prior strokes with noted differential of T2 hyperintensities including vasculitis vs demyelination. Discussed with Vascular Neurology; low concern for ongoing vasculitis/demyelination, likely represents typical evolution of prior infarcts.    Patient at baseline as of 4/22.     Resolved    Type 2 diabetes mellitus with hyperglycemia, with long-term current use of insulin  Adjusting insulin to account for diabetic TF    Patient's FSGs are controlled on current medication regimen.  Last A1c reviewed-   Lab Results   Component Value Date    HGBA1C 6.2 (H) 05/27/2024     Most recent fingerstick glucose reviewed-   Recent Labs   Lab 06/02/24  1433 06/02/24  2139 06/03/24  0822   POCTGLUCOSE 162* 170* 180*       Current correctional scale  Medium  Maintain anti-hyperglycemic dose as follows-   Antihyperglycemics (From admission, onward)      Start     Stop Route Frequency Ordered    05/27/24 1633  insulin aspart U-100 pen 0-5 Units         -- SubQ Before meals & nightly PRN 05/27/24 1533    05/25/24 2100  insulin glargine U-100 (Lantus) pen 10 Units         -- SubQ Nightly 05/25/24 1035          Hold Oral hypoglycemics while patient is in the hospital.  POCT glucose q6h    Hyponatremia  Patient has hyponatremia which is uncontrolled,We will aim to correct the sodium by 4-6mEq in 24 hours. We will monitor sodium Daily. The hyponatremia is due to ESRD. Discussed with nephrology, dialysate bath adjusted to account for and correct  hyponatremia.  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 Pt underwent extensive course, currently still healing from this, but no longer has wound vac. Pt's wound currently covered with bandage. Picture in media tabs    Plan  Wound care        VTE Risk Mitigation (From admission, onward)           Ordered     heparin (porcine) injection 5,000 Units  Every 8 hours         05/29/24 0823     heparin (porcine) injection 1,000 Units  As needed (PRN)         05/21/24 1017     heparin (porcine) injection 3,000 Units  As needed (PRN)         04/17/24 0825                    Discharge Planning   ZEKE: 6/7/2024     Code Status: Full Code   Is the patient medically ready for discharge?: No    Reason for patient still in hospital (select all that apply): Patient trending condition  Discharge Plan A: New Nursing Home placement - residential care facility            Steven Miranda MD  Department of Hospital Medicine   [facility] - Telemetry Stepdown

## 2024-06-03 NOTE — PROGRESS NOTES
06/03/24 0223   Pre-Hemodialysis Assessment   Treatment Consent Verified Yes   Treatment Status Started   Gross Bleach Negative Yes   Total Chlorine is less than 0.1 ppm Yes   Machine Number [id number]   Alarms Verified Yes   Reverse Osmosis Number 7   Extracorporeal Circuit Tested for Integrity Yes   pH 7.4   Machine Temperature 97.9 °F (36.6 °C)   Dialyzer F-180   Machine Conductivity 13.9   Meter Conductivity 13.8   Dialysate Na (mEq/L) 138   Dialysate K (mEq/L) 3   Dialysate CA (mEq/L) 2   Dialysate HCO3 (mEq/L) 30   Total UF Goal 2000   UF Rate 400   RO Rejection Within Limits? Yes        Hemodialysis Catheter 04/30/24 1708 right internal jugular   Placement Date/Time: 04/30/24 1708   Present Prior to Hospital Arrival?: Yes  Hand Hygiene: Performed  Barrier Precautions: Performed  Skin Antisepsis: ChloraPrep  Hemodialysis Catheter Type: Tunneled catheter  Location: right internal jugular  Cathet...   Line Necessity Review CRRT/HD   Verification by X-ray Yes   Site Assessment No drainage;No redness;No swelling   Line Securement Device Antimicrobial Adhesive   Dressing Type CHG impregnated dressing/sponge   Dressing Status Clean;Dry;Intact   Dressing Intervention Integrity maintained   During Hemodialysis Assessment   Blood Flow Rate (mL/min) 400 mL/min   Dialysate Flow Rate (mL/min) 800 ml/min   Ultrafiltration Rate (mL/Hr) 710 mL/Hr   Arteriovenous Lines Secure Yes   Arterial Pressure (mmHg) 200 mmHg   Venous Pressure (mmHg) 120   Blood Volume Processed (Liters) 0 L   UF Removed (mL) 0 mL   TMP 30   Venous Line in Air Detector Yes   Transducer Dry Yes   Access Visible Yes   Intra-Hemodialysis Comments hd started, pt stable     Hd started, vss, lines secured

## 2024-06-03 NOTE — NURSING
Nurses Note -- 4 Eyes      6/2/2024 2000      Skin assessed during: Q Shift Change      [x] No Altered Skin Integrity Present    []Prevention Measures Documented      [] Yes- Altered Skin Integrity Present or Discovered   [] LDA Added if Not in Epic (Describe Wound)   [] New Altered Skin Integrity was Present on Admit and Documented in LDA   [] Wound Image Taken    Wound Care Consulted? No    Attending Nurse:  ELISABETH Hampton    Second RN/Staff Member:  GONZALEZ De La Paz

## 2024-06-03 NOTE — PROGRESS NOTES
[facility] - Telemetry Stepdown  Nephrology  Progress Note    Patient Name: Sarah Saravia  MRN: [id number]  Admission Date: 4/10/2024  Hospital Length of Stay: 54 days  Attending Provider: Parviz Pena MD   Primary Care Physician: Deanna, Primary Doctor  Principal Problem:Acute cystitis with hematuria    Subjective:     Interval History: HD overnight, tolerated well. Net UF 1L. Pending placement.     Review of patient's allergies indicates:  No Known Allergies  Current Facility-Administered Medications   Medication Frequency    acetaminophen oral solution 650 mg Q6H PRN    ascorbic acid (vitamin C) tablet 500 mg BID    aspirin chewable tablet 81 mg Daily    atorvastatin tablet 40 mg Daily    dextrose 10 % infusion Continuous PRN    dextrose 10% bolus 125 mL 125 mL PRN    dextrose 10% bolus 250 mL 250 mL PRN    epoetin merry injection 6,100 Units Every Mon, Wed, Fri    glucagon (human recombinant) injection 1 mg PRN    glucose chewable tablet 16 g PRN    glucose chewable tablet 24 g PRN    heparin (porcine) injection 1,000 Units PRN    heparin (porcine) injection 3,000 Units PRN    heparin (porcine) injection 5,000 Units Q8H    hepatitis B virus vacc.rec(PF) injection 20 mcg vaccine x 1 dose    insulin aspart U-100 pen 0-5 Units QID (AC + HS) PRN    insulin glargine U-100 (Lantus) pen 10 Units QHS    metoprolol tartrate (LOPRESSOR) tablet 25 mg BID    ondansetron 4 mg/5 mL solution 4 mg Q6H PRN    pantoprazole suspension 40 mg Daily    sodium chloride 0.9% bolus 250 mL 250 mL PRN    sodium chloride 0.9% flush 10 mL Q12H PRN       Objective:     Vital Signs (Most Recent):  Temp: 99.4 °F (37.4 °C) (06/03/24 0744)  Pulse: 105 (06/03/24 0744)  Resp: 18 (06/03/24 0744)  BP: 123/87 (06/03/24 0744)  SpO2: 100 % (06/03/24 0900) Vital Signs (24h Range):  Temp:  [97.8 °F (36.6 °C)-99.4 °F (37.4 °C)] 99.4 °F (37.4 °C)  Pulse:  [] 105  Resp:  [18-20] 18  SpO2:  [95 %-100 %] 100 %  BP: (100-151)/(51-87) 123/87     Weight:  "105.5 kg (232 lb 9.4 oz) (05/29/24 1059)  Body mass index is 36.43 kg/m².  Body surface area is 2.23 meters squared.    I/O last 3 completed shifts:  In: -   Out: 3000 [Other:3000]     Physical Exam  Vitals and nursing note reviewed.   Cardiovascular:      Rate and Rhythm: Normal rate.   Pulmonary:      Effort: Pulmonary effort is normal.   Neurological:      Mental Status: She is alert.        Significant Labs:  CBC: No results for input(s): "WBC", "RBC", "HGB", "HCT", "PLT", "MCV", "MCH", "MCHC" in the last 168 hours.  CMP:   Recent Labs   Lab 06/03/24  0411   *   CALCIUM 9.6   ALBUMIN 3.6   *   K 4.1   CO2 22*   CL 98   BUN 25*   CREATININE 3.9*     All labs within the past 24 hours have been reviewed.       Assessment/Plan:     Renal/  * Acute cystitis with hematuria  - defer to primary     ESRD (end stage renal disease) on dialysis  53 y.o. Black or  Female ESRD-HD M-W-F at [facility] presents to ED on 4/10/2024 with UTI.    Of note, the patient was initiated on RRT in February 2024 after being admitted with ALVARADO 2/2 iATN due to septic shock. Perm cath placed on 2/29/24. She was transferred to [facility] on 3/12. Deemed ESRD at [facility].  Nephrology consulted for inpatient ESRD-HD management    Assessment:     - Continue MWF iHD schedule while IP.   - Continue to monitor intake and output  - Please avoid gadolinium, fleets, phos-based laxatives, NSAIDs  - Dialysis thrice weekly unless more urgent indications arise. Will evaluate RRT requirements Daily.      Lab Results   Component Value Date    FESATURATED 10 (L) 03/15/2024    FERRITIN 414 (H) 03/15/2024       - Goal in ESRD is Hgb of 10-11. Continue EPO.      Secondary hyperparathyroid of renal origin   - phos 2.0 - no indication for phos binders           Thank you for your consult. I will follow-up with patient. Please contact us if you have any additional questions.    Delfina Shi DNP  Nephrology  [facility] - Tele[facility] Step[facility]  "

## 2024-06-03 NOTE — ASSESSMENT & PLAN NOTE
Creatine stable for now. BMP reviewed- noted Estimated Creatinine Clearance: 20.9 mL/min (A) (based on SCr of 3.9 mg/dL (H)). according to latest data. Based on current GFR, CKD stage is end stage.  Monitor UOP and serial BMP and adjust therapy as needed. Renally dose meds. Avoid nephrotoxic medications and procedures.    SW onboard for placement to [facility] to continue dialysis. Once patient gets accepted at [facility], next step will be to work with daughter on intermediate NH placement.     -- HD MWF (~1L fluid removal on average per session)  -- nephro following

## 2024-06-03 NOTE — ASSESSMENT & PLAN NOTE
Adjusting insulin to account for diabetic TF    Patient's FSGs are controlled on current medication regimen.  Last A1c reviewed-   Lab Results   Component Value Date    HGBA1C 6.2 (H) 05/27/2024     Most recent fingerstick glucose reviewed-   Recent Labs   Lab 06/02/24  1433 06/02/24  2139 06/03/24  0822   POCTGLUCOSE 162* 170* 180*       Current correctional scale  Medium  Maintain anti-hyperglycemic dose as follows-   Antihyperglycemics (From admission, onward)    Start     Stop Route Frequency Ordered    05/27/24 1633  insulin aspart U-100 pen 0-5 Units         -- SubQ Before meals & nightly PRN 05/27/24 1533    05/25/24 2100  insulin glargine U-100 (Lantus) pen 10 Units         -- SubQ Nightly 05/25/24 1035        Hold Oral hypoglycemics while patient is in the hospital.  POCT glucose q6h

## 2024-06-03 NOTE — PT/OT/SLP PROGRESS
Speech Language Pathology Treatment    Patient Name:  Sarah Saravia   MRN:  [id number]  Admitting Diagnosis: Acute cystitis with hematuria    Recommendations:                 General Recommendations:  Dysphagia therapy and Speech/language therapy  Diet recommendations:  NPO, Liquid Diet Level: NPO , Thins for pleasure  Continue alternate means of nutrition, HOB to 90 degrees, Monitor for s/s of aspiration, and Strict aspiration precautions   General Precautions: Standard, aphasia, aspiration, fall, NPO  Communication strategies:  yes/no questions only and go to room if call light pushed  Assessment:     Sarah Saravia is a 53 y.o. female with an SLP diagnosis of Aphasia and Dysphagia.     Subjective     Awake/alert    Pain/Comfort:  Pain Rating 1: 0/10  Pain Rating Post-Intervention 1: 0/10    Respiratory Status: Room air    Objective:     Has the patient been evaluated by SLP for swallowing?   Yes  Keep patient NPO? Yes     Pt seen for ongoing swallow and language treatment. She was awake throughout session, but no verbalizations elicited throughout session despite max encouragement. She tolerated thin liquids x6 and puree x3 with oral holding, significantly delayed swallow initiation (30+ seconds with puree), and no overt s/s of airway compromise. Pt with increased swallow initiation with puree when thin liquids provided as a wash. Recommend NPO diet at this time with thin liquids for pleasure. Pt nodded appropriately throughout session. Continue SLP goals at this time, SLP will follow up.     Goals:   Multidisciplinary Problems       SLP Goals          Problem: SLP    Goal Priority Disciplines Outcome   SLP Goal     SLP    Description: Speech Language Pathology Goals  Updated goals expected to be met by 5/10 (goals remain appropriate 5/27 - reassess on 6/3:  1. Pt will participate in ongoing swallowing assessment to determine if appropriate for PO trials for pleasure.   2. Pt will model single step command x 1  given max cues.   3. Pt will answer simple yes/no q's during a structured receptive language task x 1 given max cues.   4. Pt will phonate purposefully upon command x 1 given max cues.   5. Pt will attempt to vocalize/verbalize during automatic speech task x 1 given max cues.   6. Pt will participate in evaluation of ability to utilize basic communication board.     Goals expected to be met by 5/8:  1. Pt will participate in Modified Barium Swallow Study to determine if safe for oral intake. Goal met/attempted 5/2                               Plan:     Patient to be seen:  3 x/week   Plan of Care expires:  05/31/24  Plan of Care reviewed with:  patient   SLP Follow-Up:  Yes       Discharge recommendations:  Moderate Intensity Therapy       Time Tracking:     SLP Treatment Date:   06/03/24  Speech Start Time:  0817  Speech Stop Time:  0832     Speech Total Time (min):  15 min    Billable Minutes: Speech Therapy Individual 7 and Treatment Swallowing Dysfunction 8    06/03/2024

## 2024-06-04 LAB
ALBUMIN SERPL BCP-MCNC: 3.3 G/DL (ref 3.5–5.2)
ALP SERPL-CCNC: 99 U/L (ref 55–135)
ALT SERPL W/O P-5'-P-CCNC: 47 U/L (ref 10–44)
ANION GAP SERPL CALC-SCNC: 12 MMOL/L (ref 8–16)
ANION GAP SERPL CALC-SCNC: 13 MMOL/L (ref 8–16)
AST SERPL-CCNC: 36 U/L (ref 10–40)
BASOPHILS # BLD AUTO: 0.05 K/UL (ref 0–0.2)
BASOPHILS NFR BLD: 0.7 % (ref 0–1.9)
BILIRUB SERPL-MCNC: 0.3 MG/DL (ref 0.1–1)
BUN SERPL-MCNC: 46 MG/DL (ref 6–20)
BUN SERPL-MCNC: 47 MG/DL (ref 6–20)
CALCIUM SERPL-MCNC: 10.6 MG/DL (ref 8.7–10.5)
CALCIUM SERPL-MCNC: 10.7 MG/DL (ref 8.7–10.5)
CHLORIDE SERPL-SCNC: 95 MMOL/L (ref 95–110)
CHLORIDE SERPL-SCNC: 97 MMOL/L (ref 95–110)
CO2 SERPL-SCNC: 21 MMOL/L (ref 23–29)
CO2 SERPL-SCNC: 24 MMOL/L (ref 23–29)
CREAT SERPL-MCNC: 6.1 MG/DL (ref 0.5–1.4)
CREAT SERPL-MCNC: 6.2 MG/DL (ref 0.5–1.4)
DIFFERENTIAL METHOD BLD: ABNORMAL
EOSINOPHIL # BLD AUTO: 0.5 K/UL (ref 0–0.5)
EOSINOPHIL NFR BLD: 6.5 % (ref 0–8)
ERYTHROCYTE [DISTWIDTH] IN BLOOD BY AUTOMATED COUNT: 15.6 % (ref 11.5–14.5)
EST. GFR  (NO RACE VARIABLE): 7.5 ML/MIN/1.73 M^2
EST. GFR  (NO RACE VARIABLE): 7.7 ML/MIN/1.73 M^2
GLUCOSE SERPL-MCNC: 148 MG/DL (ref 70–110)
GLUCOSE SERPL-MCNC: 177 MG/DL (ref 70–110)
HCT VFR BLD AUTO: 32.3 % (ref 37–48.5)
HGB BLD-MCNC: 9.8 G/DL (ref 12–16)
IMM GRANULOCYTES # BLD AUTO: 0.03 K/UL (ref 0–0.04)
IMM GRANULOCYTES NFR BLD AUTO: 0.4 % (ref 0–0.5)
LYMPHOCYTES # BLD AUTO: 2.3 K/UL (ref 1–4.8)
LYMPHOCYTES NFR BLD: 30.8 % (ref 18–48)
MCH RBC QN AUTO: 25.7 PG (ref 27–31)
MCHC RBC AUTO-ENTMCNC: 30.3 G/DL (ref 32–36)
MCV RBC AUTO: 85 FL (ref 82–98)
MONOCYTES # BLD AUTO: 1 K/UL (ref 0.3–1)
MONOCYTES NFR BLD: 13.8 % (ref 4–15)
NEUTROPHILS # BLD AUTO: 3.6 K/UL (ref 1.8–7.7)
NEUTROPHILS NFR BLD: 47.8 % (ref 38–73)
NRBC BLD-RTO: 0 /100 WBC
PLATELET # BLD AUTO: 319 K/UL (ref 150–450)
PMV BLD AUTO: 9.4 FL (ref 9.2–12.9)
POCT GLUCOSE: 128 MG/DL (ref 70–110)
POCT GLUCOSE: 157 MG/DL (ref 70–110)
POCT GLUCOSE: 189 MG/DL (ref 70–110)
POCT GLUCOSE: 202 MG/DL (ref 70–110)
POTASSIUM SERPL-SCNC: 5.2 MMOL/L (ref 3.5–5.1)
POTASSIUM SERPL-SCNC: 5.2 MMOL/L (ref 3.5–5.1)
PROT SERPL-MCNC: 8.4 G/DL (ref 6–8.4)
RBC # BLD AUTO: 3.81 M/UL (ref 4–5.4)
SODIUM SERPL-SCNC: 131 MMOL/L (ref 136–145)
SODIUM SERPL-SCNC: 131 MMOL/L (ref 136–145)
WBC # BLD AUTO: 7.53 K/UL (ref 3.9–12.7)

## 2024-06-04 PROCEDURE — 25000003 PHARM REV CODE 250

## 2024-06-04 PROCEDURE — 99232 SBSQ HOSP IP/OBS MODERATE 35: CPT | Mod: ,,, | Performed by: NURSE PRACTITIONER

## 2024-06-04 PROCEDURE — 20600001 HC STEP DOWN PRIVATE ROOM

## 2024-06-04 PROCEDURE — 80048 BASIC METABOLIC PNL TOTAL CA: CPT | Mod: XB

## 2024-06-04 PROCEDURE — 97530 THERAPEUTIC ACTIVITIES: CPT

## 2024-06-04 PROCEDURE — 80053 COMPREHEN METABOLIC PANEL: CPT

## 2024-06-04 PROCEDURE — 27000207 HC ISOLATION

## 2024-06-04 PROCEDURE — 97535 SELF CARE MNGMENT TRAINING: CPT

## 2024-06-04 PROCEDURE — 92507 TX SP LANG VOICE COMM INDIV: CPT

## 2024-06-04 PROCEDURE — 92526 ORAL FUNCTION THERAPY: CPT

## 2024-06-04 PROCEDURE — 85025 COMPLETE CBC W/AUTO DIFF WBC: CPT

## 2024-06-04 PROCEDURE — 36415 COLL VENOUS BLD VENIPUNCTURE: CPT

## 2024-06-04 PROCEDURE — 63600175 PHARM REV CODE 636 W HCPCS

## 2024-06-04 RX ORDER — SODIUM CHLORIDE 9 MG/ML
INJECTION, SOLUTION INTRAVENOUS ONCE
Status: COMPLETED | OUTPATIENT
Start: 2024-06-05 | End: 2024-06-05

## 2024-06-04 RX ADMIN — INSULIN GLARGINE 10 UNITS: 100 INJECTION, SOLUTION SUBCUTANEOUS at 09:06

## 2024-06-04 RX ADMIN — METOPROLOL TARTRATE 25 MG: 25 TABLET, FILM COATED ORAL at 09:06

## 2024-06-04 RX ADMIN — INSULIN ASPART 2 UNITS: 100 INJECTION, SOLUTION INTRAVENOUS; SUBCUTANEOUS at 08:06

## 2024-06-04 RX ADMIN — ASPIRIN 81 MG CHEWABLE TABLET 81 MG: 81 TABLET CHEWABLE at 09:06

## 2024-06-04 RX ADMIN — Medication 500 MG: at 09:06

## 2024-06-04 RX ADMIN — ATORVASTATIN CALCIUM 40 MG: 40 TABLET, FILM COATED ORAL at 09:06

## 2024-06-04 RX ADMIN — PANTOPRAZOLE SODIUM 40 MG: 40 GRANULE, DELAYED RELEASE ORAL at 09:06

## 2024-06-04 RX ADMIN — HEPARIN SODIUM 5000 UNITS: 5000 INJECTION INTRAVENOUS; SUBCUTANEOUS at 09:06

## 2024-06-04 RX ADMIN — SODIUM ZIRCONIUM CYCLOSILICATE 10 G: 5 POWDER, FOR SUSPENSION ORAL at 10:06

## 2024-06-04 RX ADMIN — HEPARIN SODIUM 5000 UNITS: 5000 INJECTION INTRAVENOUS; SUBCUTANEOUS at 06:06

## 2024-06-04 RX ADMIN — HEPARIN SODIUM 5000 UNITS: 5000 INJECTION INTRAVENOUS; SUBCUTANEOUS at 03:06

## 2024-06-04 NOTE — PT/OT/SLP PROGRESS
Speech Language Pathology Treatment    Patient Name:  Sarah Saravia   MRN:  [id number]  Admitting Diagnosis: Acute cystitis with hematuria    Recommendations:                 General Recommendations:  Dysphagia therapy and Speech/language therapy  Diet recommendations:  NPO, Liquid Diet Level: NPO , Thins for pleasure  Continue alternate means of nutrition, HOB to 90 degrees, Monitor for s/s of aspiration, and Strict aspiration precautions   General Precautions: Standard, aphasia, aspiration, fall, NPO  Communication strategies:  yes/no questions only and go to room if call light pushed    Assessment:     Sarah Saravia is a 53 y.o. female with an SLP diagnosis of Aphasia and Dysphagia.     Subjective     Awake/alert    Pain/Comfort:  Pain Rating 1: 0/10    Respiratory Status: Room air    Objective:     Has the patient been evaluated by SLP for swallowing?   Yes  Keep patient NPO? Yes     Pt did not vocalize or follow basic commands despite MAX cues/models. She nodded in response to all SLP prompts. She tolerated thin liquids x4 and puree x2 with oral holding, significantly delayed swallow initiation (30+ seconds with puree), and no overt s/s of airway compromise. Additional offering of liquid rinse did not appear to improve swallow initiation response time. Recommend NPO diet at this time with thin liquids for pleasure. Continue SLP goals at this time, SLP will follow up.     Goals:   Multidisciplinary Problems       SLP Goals          Problem: SLP    Goal Priority Disciplines Outcome   SLP Goal     SLP    Description: Speech Language Pathology Goals  Updated goals expected to be met by 5/10 (goals remain appropriate 5/27 - reassess on 6/3:  1. Pt will participate in ongoing swallowing assessment to determine if appropriate for PO trials for pleasure.   2. Pt will model single step command x 1 given max cues.   3. Pt will answer simple yes/no q's during a structured receptive language task x 1 given max cues.    4. Pt will phonate purposefully upon command x 1 given max cues.   5. Pt will attempt to vocalize/verbalize during automatic speech task x 1 given max cues.   6. Pt will participate in evaluation of ability to utilize basic communication board.     Goals expected to be met by 5/8:  1. Pt will participate in Modified Barium Swallow Study to determine if safe for oral intake. Goal met/attempted 5/2                               Plan:     Patient to be seen:  3 x/week   Plan of Care expires:  05/31/24  Plan of Care reviewed with:  patient   SLP Follow-Up:  Yes       Discharge recommendations:  Moderate Intensity Therapy       Time Tracking:     SLP Treatment Date:   06/04/24  Speech Start Time:  0933  Speech Stop Time:  0950     Speech Total Time (min):  17 min    Billable Minutes: Speech Therapy Individual 7 and Treatment Swallowing Dysfunction 10    06/04/2024

## 2024-06-04 NOTE — PROGRESS NOTES
Please see previous notes from this SW for continuity.    __________________________________________________  KARI contacted [facility] for an update regarding pt's referral. KARI spoke with clinic manager Jaimee who informed she is still awaiting response from medical director. Vivien informed she should have a response today.    SW to contact [facility] again this afternoon for an update.    Dafne Mccord, GAYLA  [facility]  [phone]

## 2024-06-04 NOTE — SUBJECTIVE & OBJECTIVE
Interval History: Pending placement. K 5.2 today.     Review of patient's allergies indicates:  No Known Allergies  Current Facility-Administered Medications   Medication Frequency    acetaminophen oral solution 650 mg Q6H PRN    ascorbic acid (vitamin C) tablet 500 mg BID    aspirin chewable tablet 81 mg Daily    atorvastatin tablet 40 mg Daily    dextrose 10 % infusion Continuous PRN    dextrose 10% bolus 125 mL 125 mL PRN    dextrose 10% bolus 250 mL 250 mL PRN    epoetin merry injection 6,100 Units Every Mon, Wed, Fri    glucagon (human recombinant) injection 1 mg PRN    glucose chewable tablet 16 g PRN    glucose chewable tablet 24 g PRN    heparin (porcine) injection 1,000 Units PRN    heparin (porcine) injection 3,000 Units PRN    heparin (porcine) injection 5,000 Units Q8H    hepatitis B virus vacc.rec(PF) injection 20 mcg vaccine x 1 dose    insulin aspart U-100 pen 0-5 Units QID (AC + HS) PRN    insulin glargine U-100 (Lantus) pen 10 Units QHS    metoprolol tartrate (LOPRESSOR) tablet 25 mg BID    ondansetron 4 mg/5 mL solution 4 mg Q6H PRN    pantoprazole suspension 40 mg Daily    sodium chloride 0.9% bolus 250 mL 250 mL PRN    sodium chloride 0.9% flush 10 mL Q12H PRN       Objective:     Vital Signs (Most Recent):  Temp: 97.8 °F (36.6 °C) (06/04/24 1201)  Pulse: 85 (06/04/24 1201)  Resp: 17 (06/04/24 1201)  BP: 127/82 (06/04/24 1201)  SpO2: 100 % (06/04/24 1201) Vital Signs (24h Range):  Temp:  [97.8 °F (36.6 °C)-98.8 °F (37.1 °C)] 97.8 °F (36.6 °C)  Pulse:  [] 85  Resp:  [17-20] 17  SpO2:  [96 %-100 %] 100 %  BP: (120-156)/(72-93) 127/82     Weight: 105.5 kg (232 lb 9.4 oz) (06/04/24 1052)  Body mass index is 36.43 kg/m².  Body surface area is 2.23 meters squared.    I/O last 3 completed shifts:  In: 200 [NG/GT:200]  Out: 3000 [Other:3000]     Physical Exam  Vitals and nursing note reviewed.   Cardiovascular:      Rate and Rhythm: Normal rate.   Pulmonary:      Effort: Pulmonary effort is normal.    Neurological:      Mental Status: She is alert.          Significant Labs:  CBC:   Recent Labs   Lab 06/04/24  0714   WBC 7.53   RBC 3.81*   HGB 9.8*   HCT 32.3*      MCV 85   MCH 25.7*   MCHC 30.3*     CMP:   Recent Labs   Lab 06/04/24  0714   *   CALCIUM 10.6*   ALBUMIN 3.3*   PROT 8.4   *   K 5.2*   CO2 21*   CL 97   BUN 47*   CREATININE 6.1*   ALKPHOS 99   ALT 47*   AST 36   BILITOT 0.3     All labs within the past 24 hours have been reviewed.

## 2024-06-04 NOTE — PROGRESS NOTES
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 History was given from patient's daughter. She was undergoing dialysis today and noticed she was lethargic, less alert than usual self. Pt completed dialysis and still not acting herself. EMS was called, fever of a 100.  On chart review, she did have an episode of large volume emesis around 1700.  Per EMS, she had a slightly elevated temp 100.0°F, glucose 300s.     In the ED: UA 2+ leuks, >100 WBC, many bacteria, WBC 17, CT abd/pelvis concerning for cystitis. Given vanc/zosyn    Overview/Hospital Course:  Patient was admitted to Hospital Medicine service for medical management and evaluation of urosepsis. Patient was continued on vanc/zosyn. Vascular Neurology consulted concerning mental status change with the recommendation to initiate ASA 81 QD and to obtain an MRI to r/o new stroke. Imaging pending. Nephrology was consulted for regularly scheduled dialysis. Afternoon of 4/12, patient was unable to tolerate filtration of volume during dialsis 2/2 hypotension. Patient received 500cc bolus and was transferred back to floor after finishing the session without volume removed. Will monitor for signs of volume overload. Tailoring insulin regimen while uptitrating tube feeds to goal rate. Staph Epi in all 4 bottles; ID with recommendation to continue Vanc & de-escalate meropenem to ertapenem on 04/15 through 4/16. Patient completed IV course of UTI coverage. Patient tolerated volume removal well in dialysis  throughout the rest of her hospital stay. Pending discharge to LTACH pending bed availability. Patient without BM with one episode of vomiting overnight 4/17. KUB with bowel gas and low concern for obstruction with no transition point noted. Escalating bowel regimen. Pending placement as of 4/22. Pulm consult placed to evaluate for possible trach downsize & eventual decannulation to assist placement if safe. Trach capping trial per pulm for 48h and will assess for decannulation on Monday. DVT studies negative. Pulm successfully decannulated pt 4/30, IR exchanged TDC. Pending swallow study with SLP and waiting for dispo options. Pt failed swallow study, placement pending. Working with PT on mobilize pt to sitting in a chair to expand placement options. Pt successfully mobilized using sandee lift but required 2 people to do so. Discussing dispo plan with family, likely d/c home if HD, DME needs able to be met. Pending acceptance at outpatient HD center. Ongoing placement difficulties d/t need for accepting HD facility to have sandee lift with 2 personnel to operate. Family meeting to discuss disposition options planned for Thursday 5/23 at 1 PM. SW onboard for placement to [facility] to continue dialysis. Once patient gets accepted at [facility], next step will be to work with daughter on half-way NH placement. Hyperkalamic, given lokelma x 1, will follow BMP.     Interval History: pending half-way NH placement. Continues on ESRD MWF. Given lokelma for hyperkalemia.     Review of Systems   Unable to perform ROS: Patient nonverbal     Objective:     Vital Signs (Most Recent):  Temp: 98.4 °F (36.9 °C) (06/04/24 0803)  Pulse: 92 (06/04/24 0803)  Resp: 17 (06/04/24 0803)  BP: (!) 156/93 (06/04/24 0921)  SpO2: 100 % (06/04/24 0803) Vital Signs (24h Range):  Temp:  [98.2 °F (36.8 °C)-98.8 °F (37.1 °C)] 98.4 °F (36.9 °C)  Pulse:  [] 92  Resp:  [17-20] 17  SpO2:  [96 %-100 %] 100 %  BP: (120-156)/(72-93) 156/93      Weight: 105.5 kg (232 lb 9.4 oz)  Body mass index is 36.43 kg/m².  No intake or output data in the 24 hours ending 06/04/24 1144      Physical Exam  Vitals and nursing note reviewed.   Constitutional:       General: She is not in acute distress.     Appearance: She is not ill-appearing, toxic-appearing or diaphoretic.   HENT:      Head: Normocephalic and atraumatic.   Eyes:      General: No scleral icterus.        Right eye: No discharge.         Left eye: No discharge.      Conjunctiva/sclera: Conjunctivae normal.   Neck:      Comments: Decannulated  Cardiovascular:      Rate and Rhythm: Normal rate and regular rhythm.      Heart sounds: Normal heart sounds.   Pulmonary:      Effort: Pulmonary effort is normal. No respiratory distress.   Abdominal:      General: Abdomen is flat. There is no distension.      Tenderness: There is no abdominal tenderness.      Comments: PEG without signs of inflammation/bleeding     Musculoskeletal:      Right lower leg: No edema.      Left lower leg: No edema.   Skin:     General: Skin is warm and dry.   Neurological:      General: No focal deficit present.      Mental Status: She is alert.      Comments: Nonverbal             Significant Labs: All pertinent labs within the past 24 hours have been reviewed.    Significant Imaging: I have reviewed all pertinent imaging results/findings within the past 24 hours.    Assessment/Plan:      * Acute cystitis with hematuria  Resolved     Pt presenting with AMS and worsening lethargy. Pt is non-verbal at baseline, does not follow commands, but was less responsive than normal. Pt found to have a fever. Urinary source suspected based off of urine and CT abd/pelvis. Pt has many prior resistant bacterial infections including urinary sources. Has prior with sensitivity to zosyn and has also improved off ED dose of vanc/zosyn. Lactic elevated a 3.8->3.49 prior to completion of fluid bolus 500ml.    Patient with new fever and tachycardia on  4/25. Low threshold to initiate additional infectious workup and repeat UA.     Plan  S/p course of vanc/ertapenem per ID. Course completed 4/16.  Daily cbc  Contact precautions    *on contact precautions indefinitely while patient still makes urine given pt incontinent per infection control    Vulvovaginal candidiasis  Noted 5/29, given 150mg fluconazole x1      Irritant contact dermatitis due friction or contact with other specified body fluids  Wound care following       Debility  Needs:  Wheelchair: patient has a mobility limitation that significantly impairs her ability to participate in one or more mobility related activities of daily living (MRADL's) such as toileting, feeding, dressing, grooming, and bathing in customary locations in the home.  The mobility limitation cannot be sufficiently resolved by the use of a cane or walker.   The use of a manual wheelchair will significantly improve the patient's ability to participate in MRADLS and the patient will use it on regular basis in the home.  Family/pt has expressed her willingness to use a manual wheelchair in the home.  She also has a caregiver who is capable of assisting in mobility.    Mrs Saravia requires a hospital bed due to her requiring positioning of the body in ways not feasible with an ordinary bed to alleviate pain/ is completely immobile /or limited mobility and cannot independently make changes in body position without the use of the bed.  The positioning of the body cannot be sufficiently resolved by the use of pillows and wedges    Patient has a mobility limitation that significantly impairs their ability to participate in one or more mobility related activities of daily living, including toileting. This deficit can be resolved by using a bedside commode. Patient demonstrates mobility limitations that will cause them to be confined to one room at home without bathroom access for up to 30 days. Using a bedside commode will greatly improve the  patient's ability to participate in MRADLs       Complication of vascular dialysis catheter  Cath intermittently clogging, not resolving with cath-hedy, going for IR venogram 4/30 with possible exchange. S/p exchange with IR on 4/30      Constipation  Resolved     Moderate malnutrition  Nutrition consulted. Most recent weight and BMI monitored-     Measurements:  Wt Readings from Last 1 Encounters:   06/04/24 105.5 kg (232 lb 9.4 oz)   Body mass index is 36.43 kg/m².    Patient has been screened and assessed by RD.    Malnutrition Type:  Context: acute illness or injury  Level: moderate    Malnutrition Characteristic Summary:  Weight Loss (Malnutrition): 10% in 6 months  Subcutaneous Fat (Malnutrition): mild depletion  Muscle Mass (Malnutrition): mild depletion  Fluid Accumulation (Malnutrition): mild    Interventions/Recommendations (treatment strategy):  1.    - on tube feeds   - previous history of failed swallow studies    Prolonged QT interval  Qtc 526 [04/10/24]      limit Qtc prolonging drugs as able    Spastic hemiplegia of right dominant side as late effect of cerebrovascular disease  Important that patient is able to sit in chair for 4h for dialysis placement needs, even if she requires lift/assistance getting into the chair     This patient has Chronic right hemiplegia due to stroke. Physical therapy services has been scheduled. Continue all standard measures for pressure injury prevention and consult wound care for any wounds (chronic or acute).    ESRD (end stage renal disease) on dialysis  Creatine stable for now. BMP reviewed- noted Estimated Creatinine Clearance: 13.3 mL/min (A) (based on SCr of 6.1 mg/dL (H)). according to latest data. Based on current GFR, CKD stage is end stage.  Monitor UOP and serial BMP and adjust therapy as needed. Renally dose meds. Avoid nephrotoxic medications and procedures.    SW onboard for placement to [facility] to continue dialysis. Once patient gets accepted at  [facility], next step will be to work with daughter on [facility] placement.     -- HD MWF (~1L fluid removal on average per session)  -- nephro following    Status post tracheostomy  Resolved, decannulated 4/30    Acute encephalopathy  Pt is non-verbal and does not follow commands at baseline. Vascular Neurology consulted given acute change from baseline. MRI with concern for evolution of prior strokes with noted differential of T2 hyperintensities including vasculitis vs demyelination. Discussed with Vascular Neurology; low concern for ongoing vasculitis/demyelination, likely represents typical evolution of prior infarcts.    Patient at baseline as of 4/22.     Resolved    Type 2 diabetes mellitus with hyperglycemia, with long-term current use of insulin  Adjusting insulin to account for diabetic TF    Patient's FSGs are controlled on current medication regimen.  Last A1c reviewed-   Lab Results   Component Value Date    HGBA1C 6.2 (H) 05/27/2024     Most recent fingerstick glucose reviewed-   Recent Labs   Lab 06/03/24  1216 06/03/24  1714 06/03/24  2127 06/04/24  0800   POCTGLUCOSE 168* 177* 179* 202*       Current correctional scale  Medium  Maintain anti-hyperglycemic dose as follows-   Antihyperglycemics (From admission, onward)      Start     Stop Route Frequency Ordered    05/27/24 1633  insulin aspart U-100 pen 0-5 Units         -- SubQ Before meals & nightly PRN 05/27/24 1533    05/25/24 2100  insulin glargine U-100 (Lantus) pen 10 Units         -- SubQ Nightly 05/25/24 1035          Hold Oral hypoglycemics while patient is in the hospital.  POCT glucose q6h    Hyponatremia  Patient has hyponatremia which is uncontrolled,We will aim to correct the sodium by 4-6mEq in 24 hours. We will monitor sodium Daily. The hyponatremia is due to ESRD. Discussed with nephrology, dialysate bath adjusted to account for and correct hyponatremia.  Recent Labs   Lab 06/04/24  0714   *     IMPROVING  - Daily  morning CMP  - Continue to montior    Ros's gangrene  Pt experienced severe episode of ros's gangrene in January of 2024. Pt underwent extensive course, currently still healing from this, but no longer has wound vac. Pt's wound currently covered with bandage. Picture in media tabs    Plan  Wound care        VTE Risk Mitigation (From admission, onward)           Ordered     heparin (porcine) injection 5,000 Units  Every 8 hours         05/29/24 0823     heparin (porcine) injection 1,000 Units  As needed (PRN)         05/21/24 1017     heparin (porcine) injection 3,000 Units  As needed (PRN)         04/17/24 0825                    Discharge Planning   ZEKE: 6/7/2024     Code Status: Full Code   Is the patient medically ready for discharge?: No    Reason for patient still in hospital (select all that apply): Patient trending condition  Discharge Plan A: New Nursing Home placement - long-term care facility          Steven Miranda MD  Department of Hospital Medicine   [facility] - Telemetry Stepdown

## 2024-06-04 NOTE — ASSESSMENT & PLAN NOTE
Nutrition consulted. Most recent weight and BMI monitored-     Measurements:  Wt Readings from Last 1 Encounters:   06/04/24 105.5 kg (232 lb 9.4 oz)   Body mass index is 36.43 kg/m².    Patient has been screened and assessed by RD.    Malnutrition Type:  Context: acute illness or injury  Level: moderate    Malnutrition Characteristic Summary:  Weight Loss (Malnutrition): 10% in 6 months  Subcutaneous Fat (Malnutrition): mild depletion  Muscle Mass (Malnutrition): mild depletion  Fluid Accumulation (Malnutrition): mild    Interventions/Recommendations (treatment strategy):  1.    - on tube feeds   - previous history of failed swallow studies

## 2024-06-04 NOTE — SUBJECTIVE & OBJECTIVE
Interval History: pending [facility] placement. Continues on ESRD MWF. Given lokelma for hyperkalemia.     Review of Systems   Unable to perform ROS: Patient nonverbal     Objective:     Vital Signs (Most Recent):  Temp: 98.4 °F (36.9 °C) (06/04/24 0803)  Pulse: 92 (06/04/24 0803)  Resp: 17 (06/04/24 0803)  BP: (!) 156/93 (06/04/24 0921)  SpO2: 100 % (06/04/24 0803) Vital Signs (24h Range):  Temp:  [98.2 °F (36.8 °C)-98.8 °F (37.1 °C)] 98.4 °F (36.9 °C)  Pulse:  [] 92  Resp:  [17-20] 17  SpO2:  [96 %-100 %] 100 %  BP: (120-156)/(72-93) 156/93     Weight: 105.5 kg (232 lb 9.4 oz)  Body mass index is 36.43 kg/m².  No intake or output data in the 24 hours ending 06/04/24 1144      Physical Exam  Vitals and nursing note reviewed.   Constitutional:       General: She is not in acute distress.     Appearance: She is not ill-appearing, toxic-appearing or diaphoretic.   HENT:      Head: Normocephalic and atraumatic.   Eyes:      General: No scleral icterus.        Right eye: No discharge.         Left eye: No discharge.      Conjunctiva/sclera: Conjunctivae normal.   Neck:      Comments: Decannulated  Cardiovascular:      Rate and Rhythm: Normal rate and regular rhythm.      Heart sounds: Normal heart sounds.   Pulmonary:      Effort: Pulmonary effort is normal. No respiratory distress.   Abdominal:      General: Abdomen is flat. There is no distension.      Tenderness: There is no abdominal tenderness.      Comments: PEG without signs of inflammation/bleeding     Musculoskeletal:      Right lower leg: No edema.      Left lower leg: No edema.   Skin:     General: Skin is warm and dry.   Neurological:      General: No focal deficit present.      Mental Status: She is alert.      Comments: Nonverbal             Significant Labs: All pertinent labs within the past 24 hours have been reviewed.    Significant Imaging: I have reviewed all pertinent imaging results/findings within the past 24 hours.

## 2024-06-04 NOTE — SUBJECTIVE & OBJECTIVE
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 History was given from patient's daughter. She was undergoing dialysis today and noticed she was lethargic, less alert than usual self. Pt completed dialysis and still not acting herself. EMS was called, fever of a 100.  On chart review, she did have an episode of large volume emesis around 1700.  Per EMS, she had a slightly elevated temp 100.0°F, glucose 300s.      In the ED: UA 2+ leuks, >100 WBC, many bacteria, WBC 17, CT abd/pelvis concerning for cystitis. Given vanc/zosyn. Patient admitted to hospital medicine service for further management. Skin integrity MARCOS consulted for evaluation of skin injury.    Hospital Course:   No notes on file          Scheduled Meds:   ascorbic acid (vitamin C)  500 mg Per G Tube BID    aspirin  81 mg Per G Tube Daily    atorvastatin  40 mg Per G Tube Daily    epoetin merry (PROCRIT) injection  50 Units/kg Intravenous Every Mon, Wed, Fri    heparin (porcine)  5,000 Units Subcutaneous Q8H    insulin glargine U-100  10 Units Subcutaneous QHS    metoprolol tartrate  25 mg Per G Tube BID    pantoprazole  40 mg Per G Tube Daily     Continuous Infusions:   dextrose 10 % in water (D10W)   Intravenous Continuous PRN         PRN Meds:  Current Facility-Administered Medications:     acetaminophen, 650 mg, Per G Tube, Q6H PRN    dextrose 10 % in water (D10W), , Intravenous, Continuous PRN    dextrose 10%, 12.5 g, Intravenous, PRN    dextrose 10%, 25 g, Intravenous, PRN    glucagon (human recombinant), 1 mg, Intramuscular, PRN    glucose, 16 g, Oral, PRN    glucose, 24 g, Oral, PRN    heparin (porcine), 1,000 Units, Intra-Catheter, PRN    heparin (porcine), 3,000 Units, Intravenous, PRN    hepatitis B virus vacc.rec(PF), 20 mcg, Intramuscular, vaccine x 1 dose    insulin aspart U-100,  0-5 Units, Subcutaneous, QID (AC + HS) PRN    ondansetron, 4 mg, Per G Tube, Q6H PRN    sodium chloride 0.9%, 250 mL, Intravenous, PRN    sodium chloride 0.9%, 10 mL, Intravenous, Q12H PRN    Review of Systems   Skin:  Positive for wound.     Objective:     Vital Signs (Most Recent):  Temp: 97.8 °F (36.6 °C) (06/04/24 1201)  Pulse: 85 (06/04/24 1201)  Resp: 17 (06/04/24 1201)  BP: 127/82 (06/04/24 1201)  SpO2: 100 % (06/04/24 1201) Vital Signs (24h Range):  Temp:  [97.8 °F (36.6 °C)-98.8 °F (37.1 °C)] 97.8 °F (36.6 °C)  Pulse:  [] 85  Resp:  [17-20] 17  SpO2:  [96 %-100 %] 100 %  BP: (120-156)/(72-93) 127/82     Weight: 105.5 kg (232 lb 9.4 oz)  Body mass index is 36.43 kg/m².     Physical Exam  Constitutional:       Appearance: Normal appearance.   Skin:     General: Skin is warm and dry.      Findings: Lesion present.   Neurological:      Mental Status: She is alert.          Laboratory:  All pertinent labs reviewed within the last 24 hours.    Diagnostic Results:  None

## 2024-06-04 NOTE — PT/OT/SLP PROGRESS
Occupational Therapy   Treatment    Name: Sarah Saravia  MRN: [id number]  Admitting Diagnosis:  Acute cystitis with hematuria     Tech: beverley  Recommendations:     Discharge Recommendations: Moderate Intensity Therapy  Discharge Equipment Recommendations:  feeding device, lift device, hospital bed  Barriers to discharge:  Other (Comment) (requires significant assist)    Assessment:     Sarah Saravia is a 53 y.o. female with a medical diagnosis of Acute cystitis with hematuria.  She presents with deficits in functional mobility with improvements noted with supine to sit but still does require 2 person assist. Pt.'s sitting balance continues to improve and able to participate in functional routine tasks with CGA/SBA. Pt. Engaged fairly well in session. Patient would benefit from continued OT services to maximize level of safety and independence with self-care tasks.    . Performance deficits affecting function are weakness, impaired endurance, impaired self care skills, impaired functional mobility, impaired cognition, decreased lower extremity function, decreased upper extremity function, decreased safety awareness, decreased coordination.     Rehab Prognosis:  Good; patient would benefit from acute skilled OT services to address these deficits and reach maximum level of function.       Plan:     Patient to be seen 3 x/week to address the above listed problems via self-care/home management, therapeutic activities, therapeutic exercises, neuromuscular re-education  Plan of Care Expires: 06/13/24  Plan of Care Reviewed with: patient    Subjective     Chief Complaint: pt. With no verbalizations on this date  Patient/Family Comments/goals: no goals stated but agreeable to session  Pain/Comfort:  Pain Rating 1: 0/10  Pain Rating Post-Intervention 1: 0/10    Objective:     Communicated with: nurse prior to session.  Patient found supine with PEG Tube upon OT entry to room.    General Precautions: Standard, aphasia,  aspiration, fall, NPO    Orthopedic Precautions:N/A  Braces: N/A  Respiratory Status: Room air     Occupational Performance:     Bed Mobility:    Patient completed Supine to Sit with maximal assistance and 2 persons  Patient completed Sit to Supine with total assistance and 2 persons     Functional Mobility/Transfers:  Pt. Required max/Total A x 2 to scoot along EOB to HOB x 4 lateral scoots   Functional Mobility: not tested    Activities of Daily Living:  Grooming: maximal assistance to apply lotion to thighs on this date seated EOB but able to apply lip balm      AMPAC 6 Click ADL: 10    Treatment & Education:  Pt. Engaged in dynamic activities including: tapping balloon back and forth with therapist when seated EOB x multiple trials with Min vc's and CGA for safety.   Pt. Wiped table top with CGA but did decline towel folding on this date.     Patient left supine with all lines intact and call button in reach    GOALS:   Multidisciplinary Problems       Occupational Therapy Goals          Problem: Occupational Therapy    Goal Priority Disciplines Outcome Interventions   Occupational Therapy Goal     OT, PT/OT Progressing    Description: Goals to be met by: 5/22/24 Goals revised as needed on 05-30 to be met by 06-19:    Patient will increase functional independence with ADLs by performing:    UE Dressing with Maximum Assistance.MET 05-30  Revised: UBD with Min A  Grooming while EOB with Maximum Assistance.Met 05-30  Revised: Grooming seated EOB with CGA  Sitting at edge of bed x5 minutes with Maximum Assistance. - Met 5/22  Revised: Sit EOB x 20 minutes with SBA  Rolling to Bilateral with Moderate Assistance.NOT MET     Supine to sit with Maximum Assistance. NOT MET                           Time Tracking:     OT Date of Treatment: 06/04/24  OT Start Time: 1300  OT Stop Time: 1335  OT Total Time (min): 35 min    Billable Minutes:Self Care/Home Management 15  Therapeutic Activity 20    OT/JOSÉ: OT     Number of JOSÉ  visits since last OT visit: 0    6/4/2024

## 2024-06-04 NOTE — PROGRESS NOTES
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 History was given from patient's daughter. She was undergoing dialysis today and noticed she was lethargic, less alert than usual self. Pt completed dialysis and still not acting herself. EMS was called, fever of a 100.  On chart review, she did have an episode of large volume emesis around 1700.  Per EMS, she had a slightly elevated temp 100.0°F, glucose 300s.      In the ED: UA 2+ leuks, >100 WBC, many bacteria, WBC 17, CT abd/pelvis concerning for cystitis. Given vanc/zosyn. Patient admitted to hospital medicine service for further management. Skin integrity MARCOS consulted for evaluation of skin injury.    Hospital Course:   No notes on file          Scheduled Meds:   ascorbic acid (vitamin C)  500 mg Per G Tube BID    aspirin  81 mg Per G Tube Daily    atorvastatin  40 mg Per G Tube Daily    epoetin merry (PROCRIT) injection  50 Units/kg Intravenous Every Mon, Wed, Fri    heparin (porcine)  5,000 Units Subcutaneous Q8H    insulin glargine U-100  10 Units Subcutaneous QHS    metoprolol tartrate  25 mg Per G Tube BID    pantoprazole  40 mg Per G Tube Daily     Continuous Infusions:   dextrose 10 % in water (D10W)   Intravenous Continuous PRN         PRN Meds:  Current Facility-Administered Medications:     acetaminophen, 650 mg, Per G Tube, Q6H PRN    dextrose 10 % in water (D10W), , Intravenous, Continuous PRN    dextrose 10%, 12.5 g, Intravenous, PRN    dextrose 10%, 25 g, Intravenous, PRN    glucagon (human recombinant), 1 mg, Intramuscular,  PRN    glucose, 16 g, Oral, PRN    glucose, 24 g, Oral, PRN    heparin (porcine), 1,000 Units, Intra-Catheter, PRN    heparin (porcine), 3,000 Units, Intravenous, PRN    hepatitis B virus vacc.rec(PF), 20 mcg, Intramuscular, vaccine x 1 dose    insulin aspart U-100, 0-5 Units, Subcutaneous, QID (AC + HS) PRN    ondansetron, 4 mg, Per G Tube, Q6H PRN    sodium chloride 0.9%, 250 mL, Intravenous, PRN    sodium chloride 0.9%, 10 mL, Intravenous, Q12H PRN    Review of Systems   Skin:  Positive for wound.     Objective:     Vital Signs (Most Recent):  Temp: 97.8 °F (36.6 °C) (06/04/24 1201)  Pulse: 85 (06/04/24 1201)  Resp: 17 (06/04/24 1201)  BP: 127/82 (06/04/24 1201)  SpO2: 100 % (06/04/24 1201) Vital Signs (24h Range):  Temp:  [97.8 °F (36.6 °C)-98.8 °F (37.1 °C)] 97.8 °F (36.6 °C)  Pulse:  [] 85  Resp:  [17-20] 17  SpO2:  [96 %-100 %] 100 %  BP: (120-156)/(72-93) 127/82     Weight: 105.5 kg (232 lb 9.4 oz)  Body mass index is 36.43 kg/m².     Physical Exam  Constitutional:       Appearance: Normal appearance.   Skin:     General: Skin is warm and dry.      Findings: Lesion present.   Neurological:      Mental Status: She is alert.          Laboratory:  All pertinent labs reviewed within the last 24 hours.    Diagnostic Results:  None    Assessment/Plan:         MARCOS Skin Integrity Evaluation      Skin Integrity MARCOS evaluation of patient as part of the comprehensive skin care team.     She has been admitted for 55 days. Skin injury was noted on 5/25/24. POA no.    Peg site        Derm  Irritant contact dermatitis due friction or contact with other specified body fluids  - follow up evaluation of skin injury.  - pt presenting from [facility] with AMS.   - scant sanguinous drainage and pink scar tissue visible beneath peg tube insertion site from external bumper friction and moisture.  - continue foam dressing to site.  - barber surface.  - wedge/heel boots for offloading.  - turn q2h.  - nursing to  maintain pressure injury prevention measures and continue wound care per orders.        Beth Langley NP  Skin Integrity [facility]  [facility] - Telemetry Stepdown

## 2024-06-04 NOTE — ASSESSMENT & PLAN NOTE
Creatine stable for now. BMP reviewed- noted Estimated Creatinine Clearance: 13.3 mL/min (A) (based on SCr of 6.1 mg/dL (H)). according to latest data. Based on current GFR, CKD stage is end stage.  Monitor UOP and serial BMP and adjust therapy as needed. Renally dose meds. Avoid nephrotoxic medications and procedures.    SW onboard for placement to [facility] to continue dialysis. Once patient gets accepted at [facility], next step will be to work with daughter on halfway NH placement.     -- HD MWF (~1L fluid removal on average per session)  -- nephro following

## 2024-06-04 NOTE — NURSING
Nurses Note -- 4 Eyes      6/3/24  1930      Skin assessed during: Q Shift Change      [x] No Altered Skin Integrity Present    []Prevention Measures Documented      [] Yes- Altered Skin Integrity Present or Discovered   [] LDA Added if Not in Epic (Describe Wound)   [] New Altered Skin Integrity was Present on Admit and Documented in LDA   [] Wound Image Taken    Wound Care Consulted? No    Attending Nurse:  ELISABETH Hampton     Second RN/Staff Member: ELISABETH Barnes

## 2024-06-04 NOTE — PROGRESS NOTES
[facility] - Telemetry Stepdown  Nephrology  Progress Note    Patient Name: Sarah Saravia  MRN: [id number]  Admission Date: 4/10/2024  Hospital Length of Stay: 55 days  Attending Provider: Parviz Pena MD   Primary Care Physician: Deanna, Primary Doctor  Principal Problem:Acute cystitis with hematuria    Subjective:     Interval History: Pending placement. K 5.2 today.     Review of patient's allergies indicates:  No Known Allergies  Current Facility-Administered Medications   Medication Frequency    acetaminophen oral solution 650 mg Q6H PRN    ascorbic acid (vitamin C) tablet 500 mg BID    aspirin chewable tablet 81 mg Daily    atorvastatin tablet 40 mg Daily    dextrose 10 % infusion Continuous PRN    dextrose 10% bolus 125 mL 125 mL PRN    dextrose 10% bolus 250 mL 250 mL PRN    epoetin merry injection 6,100 Units Every Mon, Wed, Fri    glucagon (human recombinant) injection 1 mg PRN    glucose chewable tablet 16 g PRN    glucose chewable tablet 24 g PRN    heparin (porcine) injection 1,000 Units PRN    heparin (porcine) injection 3,000 Units PRN    heparin (porcine) injection 5,000 Units Q8H    hepatitis B virus vacc.rec(PF) injection 20 mcg vaccine x 1 dose    insulin aspart U-100 pen 0-5 Units QID (AC + HS) PRN    insulin glargine U-100 (Lantus) pen 10 Units QHS    metoprolol tartrate (LOPRESSOR) tablet 25 mg BID    ondansetron 4 mg/5 mL solution 4 mg Q6H PRN    pantoprazole suspension 40 mg Daily    sodium chloride 0.9% bolus 250 mL 250 mL PRN    sodium chloride 0.9% flush 10 mL Q12H PRN       Objective:     Vital Signs (Most Recent):  Temp: 97.8 °F (36.6 °C) (06/04/24 1201)  Pulse: 85 (06/04/24 1201)  Resp: 17 (06/04/24 1201)  BP: 127/82 (06/04/24 1201)  SpO2: 100 % (06/04/24 1201) Vital Signs (24h Range):  Temp:  [97.8 °F (36.6 °C)-98.8 °F (37.1 °C)] 97.8 °F (36.6 °C)  Pulse:  [] 85  Resp:  [17-20] 17  SpO2:  [96 %-100 %] 100 %  BP: (120-156)/(72-93) 127/82     Weight: 105.5 kg (232 lb 9.4 oz)  (06/04/24 1052)  Body mass index is 36.43 kg/m².  Body surface area is 2.23 meters squared.    I/O last 3 completed shifts:  In: 200 [NG/GT:200]  Out: 3000 [Other:3000]     Physical Exam  Vitals and nursing note reviewed.   Cardiovascular:      Rate and Rhythm: Normal rate.   Pulmonary:      Effort: Pulmonary effort is normal.   Neurological:      Mental Status: She is alert.          Significant Labs:  CBC:   Recent Labs   Lab 06/04/24  0714   WBC 7.53   RBC 3.81*   HGB 9.8*   HCT 32.3*      MCV 85   MCH 25.7*   MCHC 30.3*     CMP:   Recent Labs   Lab 06/04/24  0714   *   CALCIUM 10.6*   ALBUMIN 3.3*   PROT 8.4   *   K 5.2*   CO2 21*   CL 97   BUN 47*   CREATININE 6.1*   ALKPHOS 99   ALT 47*   AST 36   BILITOT 0.3     All labs within the past 24 hours have been reviewed.         Assessment/Plan:     Renal/  * Acute cystitis with hematuria  - defer to primary     ESRD (end stage renal disease) on dialysis  53 y.o. Black or  Female ESRD-HD M-W-F at [facility] presents to ED on 4/10/2024 with UTI.    Of note, the patient was initiated on RRT in February 2024 after being admitted with ALVARADO 2/2 iATN due to septic shock. Perm cath placed on 2/29/24. She was transferred to [facility] on 3/12. Deemed ESRD at [facility].  Nephrology consulted for inpatient ESRD-HD management    Assessment:     - Continue MWF iHD schedule while IP.   - Continue to monitor intake and output  - Please avoid gadolinium, fleets, phos-based laxatives, NSAIDs  - Dialysis thrice weekly unless more urgent indications arise. Will evaluate RRT requirements Daily.      Lab Results   Component Value Date    FESATURATED 10 (L) 03/15/2024    FERRITIN 414 (H) 03/15/2024       - Goal in ESRD is Hgb of 10-11. Continue EPO.      Secondary hyperparathyroid of renal origin   - phos 2.0 - no indication for phos binders           Thank you for your consult. I will follow-up with patient. Please contact us if you have any additional  questions.    Delfina Shi, POONAM  Nephrology  [facility] - Tele[facility] Stepdown

## 2024-06-04 NOTE — PLAN OF CARE
PT alert and oriented to self. PT speaks when she wants but is noted to have delayed responses when she will speak.  She has remained free of falls and injuries. Safety eduction and plan of care reviewed with PT. Bed is low and locked with call light with in easy reach.  Bed alarm set.

## 2024-06-04 NOTE — ASSESSMENT & PLAN NOTE
- follow up evaluation of skin injury.  - pt presenting from [facility] extended with AMS.   - scant sanguinous drainage and pink scar tissue visible beneath peg tube insertion site from external bumper friction and moisture.  - continue foam dressing to site.  - barber surface.  - wedge/heel boots for offloading.  - turn q2h.  - nursing to maintain pressure injury prevention measures and continue wound care per orders.

## 2024-06-04 NOTE — PROGRESS NOTES
[facility] - Telemetry Stepdown  Adult Nutrition  Progress Note    SUMMARY       Recommendations    If diabetic formula warranted, rec'd Glucerna 1.5 @ 50 mL/hr = 1800 kcals, 99 g of protein, 911 mL fluid.   If renal formula desired, rec'd Novasource @ 40 mL/hr = 1920 kcals, 87 g of protein, 688 mL fluid.  RD to monitor & follow-up.    Goals: Meet % EEN, EPN by RD f/u date  Nutrition Goal Status: goal not met  Communication of RD Recs: reviewed with RN    Assessment and Plan    Moderate malnutrition    Malnutrition Type:  Context: acute illness or injury  Level: moderate    Related to (etiology):   Inability to consume sufficient energy     Signs and Symptoms (as evidenced by):   Weight loss, NFPE, Edema    Malnutrition Characteristic Summary:  Weight Loss (Malnutrition): 10% in 6 months  Subcutaneous Fat (Malnutrition): mild depletion  Muscle Mass (Malnutrition): mild depletion  Fluid Accumulation (Malnutrition): mild    Interventions/Recommendations (treatment strategy):  Collaboration of nutrition care w/ other providers  EN    Nutrition Diagnosis Status:   Continues     Malnutrition Assessment    Malnutrition Context: acute illness or injury  Malnutrition Level: moderate    Weight Loss (Malnutrition): 10% in 6 months  Subcutaneous Fat (Malnutrition): mild depletion  Muscle Mass (Malnutrition): mild depletion  Fluid Accumulation (Malnutrition): mild     Reason for Assessment    Reason For Assessment: RD follow-up  Diagnosis: other (see comments) (Acute cystitis with hematuria)  Relevant Medical History: CVA, PEG, DM  Interdisciplinary Rounds: did not attend    General Information Comments: Remains NPO (per SLP), tolerating TFs via PEG. Pt received HD overnight. Noted formula change on 5/29 2/2 elevated BS. Moderate malnutrition diagnosis continues; please see PES statement for details.   Nutrition Discharge Planning: Adequate nutrition    Nutrition/Diet History    Spiritual, Cultural Beliefs, Anabaptism  "Practices, Values that Affect Care: no  Food Allergies: NKFA  Factors Affecting Nutritional Intake: NPO    Anthropometrics    Temp: 98.4 °F (36.9 °C)  Height Method: Stated  Height: 5' 7" (170.2 cm)  Height (inches): 67 in  Weight Method: Bed Scale  Weight: 105.5 kg (232 lb 9.4 oz)  Weight (lb): 232.59 lb  Ideal Body Weight (IBW), Female: 135 lb  % Ideal Body Weight, Female (lb): 172.29 %  BMI (Calculated): 36.4  BMI Grade: 35 - 39.9 - obesity - grade II  Usual Body Weight (UBW), kg: (!) 136.4 kg  % Usual Body Weight: 90  % Weight Change From Usual Weight: -10.19 %    Lab/Procedures/Meds    Pertinent Labs Reviewed: reviewed  Pertinent Labs Comments: K 5.2, Creat 6.1, GFR 7.7, A1C 6.2  Pertinent Medications Reviewed: reviewed  Pertinent Medications Comments: -    Estimated/Assessed Needs    Weight Used For Calorie Calculations: 105.5 kg (232 lb 9.4 oz)    Energy Calorie Requirements (kcal): 2030 kcal/d  Energy Need Method: [place] Jeor (1.2 PAL)    Protein Requirements: 95 g/d (.9 g/kg)  Weight Used For Protein Calculations: 105.5 kg (232 lb 9.4 oz)    Estimated Fluid Requirement Method: other (see comments) (Per MD)  RDA Method (mL): 2030    CHO Requirement: 254g    Nutrition Prescription Ordered    Current Diet Order: NPO  Current Nutrition Support Formula Ordered: Diabetisource AC  Current Nutrition Support Rate Ordered: 40 mL/hr    Evaluation of Received Nutrient/Fluid Intake    Enteral Calories (kcal): 1152  Enteral Protein (gm): 58  Enteral (Free Water) Fluid (mL): 785  Free Water Flush Fluid (mL): 400    % Kcal Needs: 57%  % Protein Needs: 61%    I/O: -9L since 5/21    Energy Calories Required: not meeting needs  Protein Required: not meeting needs  Fluid Required: other (see comments) (Per MD)    Comments: LBM: 6/2    Tolerance: tolerating    Nutrition Risk    Level of Risk/Frequency of Follow-up:  (1x/week)     Monitor and Evaluation    Food and Nutrient Intake: enteral nutrition intake  Food and Nutrient " Adminstration: enteral and parenteral nutrition administration  Physical Activity and Function: nutrition-related ADLs and IADLs  Anthropometric Measurements: weight, weight change  Biochemical Data, Medical Tests and Procedures: inflammatory profile, lipid profile, glucose/endocrine profile, gastrointestinal profile, electrolyte and renal panel  Nutrition-Focused Physical Findings: overall appearance     Nutrition Follow-Up    RD Follow-up?: Yes

## 2024-06-04 NOTE — ASSESSMENT & PLAN NOTE
53 y.o. Black or  Female ESRD-HD M-W-F at [facility] presents to ED on 4/10/2024 with UTI.    Of note, the patient was initiated on RRT in February 2024 after being admitted with ALVARADO 2/2 iATN due to septic shock. Perm cath placed on 2/29/24. She was transferred to [facility] on 3/12. Deemed ESRD at [facility].  Nephrology consulted for inpatient ESRD-HD management    Assessment:     - Continue MWF iHD schedule while IP.   - Continue to monitor intake and output  - Please avoid gadolinium, fleets, phos-based laxatives, NSAIDs  - Dialysis thrice weekly unless more urgent indications arise. Will evaluate RRT requirements Daily.      Lab Results   Component Value Date    FESATURATED 10 (L) 03/15/2024    FERRITIN 414 (H) 03/15/2024       - Goal in ESRD is Hgb of 10-11. Continue EPO.      Secondary hyperparathyroid of renal origin   - phos 2.0 - no indication for phos binders

## 2024-06-04 NOTE — ASSESSMENT & PLAN NOTE
Adjusting insulin to account for diabetic TF    Patient's FSGs are controlled on current medication regimen.  Last A1c reviewed-   Lab Results   Component Value Date    HGBA1C 6.2 (H) 05/27/2024     Most recent fingerstick glucose reviewed-   Recent Labs   Lab 06/03/24  1216 06/03/24  1714 06/03/24  2127 06/04/24  0800   POCTGLUCOSE 168* 177* 179* 202*       Current correctional scale  Medium  Maintain anti-hyperglycemic dose as follows-   Antihyperglycemics (From admission, onward)    Start     Stop Route Frequency Ordered    05/27/24 1633  insulin aspart U-100 pen 0-5 Units         -- SubQ Before meals & nightly PRN 05/27/24 1533    05/25/24 2100  insulin glargine U-100 (Lantus) pen 10 Units         -- SubQ Nightly 05/25/24 1035        Hold Oral hypoglycemics while patient is in the hospital.  POCT glucose q6h

## 2024-06-05 LAB
ALBUMIN SERPL BCP-MCNC: 3.4 G/DL (ref 3.5–5.2)
ANION GAP SERPL CALC-SCNC: 15 MMOL/L (ref 8–16)
ANION GAP SERPL CALC-SCNC: 16 MMOL/L (ref 8–16)
BUN SERPL-MCNC: 56 MG/DL (ref 6–20)
BUN SERPL-MCNC: 57 MG/DL (ref 6–20)
CALCIUM SERPL-MCNC: 10.9 MG/DL (ref 8.7–10.5)
CALCIUM SERPL-MCNC: 11 MG/DL (ref 8.7–10.5)
CHLORIDE SERPL-SCNC: 95 MMOL/L (ref 95–110)
CHLORIDE SERPL-SCNC: 97 MMOL/L (ref 95–110)
CO2 SERPL-SCNC: 19 MMOL/L (ref 23–29)
CO2 SERPL-SCNC: 21 MMOL/L (ref 23–29)
CREAT SERPL-MCNC: 7.2 MG/DL (ref 0.5–1.4)
CREAT SERPL-MCNC: 7.5 MG/DL (ref 0.5–1.4)
EST. GFR  (NO RACE VARIABLE): 6 ML/MIN/1.73 M^2
EST. GFR  (NO RACE VARIABLE): 6.3 ML/MIN/1.73 M^2
GLUCOSE SERPL-MCNC: 122 MG/DL (ref 70–110)
GLUCOSE SERPL-MCNC: 138 MG/DL (ref 70–110)
PHOSPHATE SERPL-MCNC: 5.2 MG/DL (ref 2.7–4.5)
POCT GLUCOSE: 120 MG/DL (ref 70–110)
POCT GLUCOSE: 145 MG/DL (ref 70–110)
POCT GLUCOSE: 161 MG/DL (ref 70–110)
POCT GLUCOSE: 175 MG/DL (ref 70–110)
POTASSIUM SERPL-SCNC: 5.3 MMOL/L (ref 3.5–5.1)
POTASSIUM SERPL-SCNC: 5.4 MMOL/L (ref 3.5–5.1)
SODIUM SERPL-SCNC: 131 MMOL/L (ref 136–145)
SODIUM SERPL-SCNC: 132 MMOL/L (ref 136–145)

## 2024-06-05 PROCEDURE — 63600175 PHARM REV CODE 636 W HCPCS: Performed by: INTERNAL MEDICINE

## 2024-06-05 PROCEDURE — 90935 HEMODIALYSIS ONE EVALUATION: CPT | Mod: ,,, | Performed by: NURSE PRACTITIONER

## 2024-06-05 PROCEDURE — 63600175 PHARM REV CODE 636 W HCPCS: Performed by: NURSE PRACTITIONER

## 2024-06-05 PROCEDURE — 36415 COLL VENOUS BLD VENIPUNCTURE: CPT

## 2024-06-05 PROCEDURE — 63600175 PHARM REV CODE 636 W HCPCS

## 2024-06-05 PROCEDURE — 97530 THERAPEUTIC ACTIVITIES: CPT | Mod: CQ

## 2024-06-05 PROCEDURE — 25000003 PHARM REV CODE 250

## 2024-06-05 PROCEDURE — 25000003 PHARM REV CODE 250: Performed by: NURSE PRACTITIONER

## 2024-06-05 PROCEDURE — 27000207 HC ISOLATION

## 2024-06-05 PROCEDURE — 80100014 HC HEMODIALYSIS 1:1

## 2024-06-05 PROCEDURE — 80048 BASIC METABOLIC PNL TOTAL CA: CPT

## 2024-06-05 PROCEDURE — 80069 RENAL FUNCTION PANEL: CPT

## 2024-06-05 PROCEDURE — 94761 N-INVAS EAR/PLS OXIMETRY MLT: CPT

## 2024-06-05 PROCEDURE — 20600001 HC STEP DOWN PRIVATE ROOM

## 2024-06-05 RX ADMIN — SODIUM CHLORIDE: 9 INJECTION, SOLUTION INTRAVENOUS at 12:06

## 2024-06-05 RX ADMIN — Medication 500 MG: at 09:06

## 2024-06-05 RX ADMIN — METOPROLOL TARTRATE 25 MG: 25 TABLET, FILM COATED ORAL at 09:06

## 2024-06-05 RX ADMIN — HEPARIN SODIUM 5000 UNITS: 5000 INJECTION INTRAVENOUS; SUBCUTANEOUS at 06:06

## 2024-06-05 RX ADMIN — SODIUM ZIRCONIUM CYCLOSILICATE 10 G: 5 POWDER, FOR SUSPENSION ORAL at 06:06

## 2024-06-05 RX ADMIN — HEPARIN SODIUM 5000 UNITS: 5000 INJECTION INTRAVENOUS; SUBCUTANEOUS at 09:06

## 2024-06-05 RX ADMIN — ATORVASTATIN CALCIUM 40 MG: 40 TABLET, FILM COATED ORAL at 09:06

## 2024-06-05 RX ADMIN — INSULIN GLARGINE 10 UNITS: 100 INJECTION, SOLUTION SUBCUTANEOUS at 09:06

## 2024-06-05 RX ADMIN — HEPARIN SODIUM 3000 UNITS: 1000 INJECTION, SOLUTION INTRAVENOUS; SUBCUTANEOUS at 12:06

## 2024-06-05 RX ADMIN — PANTOPRAZOLE SODIUM 40 MG: 40 GRANULE, DELAYED RELEASE ORAL at 09:06

## 2024-06-05 RX ADMIN — ERYTHROPOIETIN 6100 UNITS: 10000 INJECTION, SOLUTION INTRAVENOUS; SUBCUTANEOUS at 03:06

## 2024-06-05 RX ADMIN — HEPARIN SODIUM 5000 UNITS: 5000 INJECTION INTRAVENOUS; SUBCUTANEOUS at 03:06

## 2024-06-05 RX ADMIN — ASPIRIN 81 MG CHEWABLE TABLET 81 MG: 81 TABLET CHEWABLE at 09:06

## 2024-06-05 RX ADMIN — HEPARIN SODIUM 1000 UNITS: 1000 INJECTION, SOLUTION INTRAVENOUS; SUBCUTANEOUS at 04:06

## 2024-06-05 NOTE — PROGRESS NOTES
[facility] NEPHROLOGY STAFF HEMODIALYSIS NOTE     Patient currently on hemodialysis for removal of uremic toxins and volume.     Patient seen and evaluated on hemodialysis, tolerating treatment, see HD flowsheet for vitals and assessments.    Labs have been reviewed and the dialysate bath has been adjusted.       Assessment/Plan:    -Pending placemtn   -Patient seen on HD, tolerating treatment well, w/o complaints   -UF goal of 2L  -Renal diet, if not NPO   -Strict I/O's and daily weights  -Daily renal function panels  -Keep MAP >65 while on HD   -Hgb goal 10-11, continue epo   -Will continue to follow while inpatient     Delfina Shi DNP-FNP, C  Nephrology  Pager: [phone]

## 2024-06-05 NOTE — PROGRESS NOTES
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 History was given from patient's daughter. She was undergoing dialysis today and noticed she was lethargic, less alert than usual self. Pt completed dialysis and still not acting herself. EMS was called, fever of a 100.  On chart review, she did have an episode of large volume emesis around 1700.  Per EMS, she had a slightly elevated temp 100.0°F, glucose 300s.     In the ED: UA 2+ leuks, >100 WBC, many bacteria, WBC 17, CT abd/pelvis concerning for cystitis. Given vanc/zosyn    Overview/Hospital Course:  Patient was admitted to Hospital Medicine service for medical management and evaluation of urosepsis. Patient was continued on vanc/zosyn. Vascular Neurology consulted concerning mental status change with the recommendation to initiate ASA 81 QD and to obtain an MRI to r/o new stroke. Imaging pending. Nephrology was consulted for regularly scheduled dialysis. Afternoon of 4/12, patient was unable to tolerate filtration of volume during dialsis 2/2 hypotension. Patient received 500cc bolus and was transferred back to floor after finishing the session without volume removed. Will monitor for signs of volume overload. Tailoring insulin regimen while uptitrating tube feeds to goal rate. Staph Epi in all 4 bottles; ID with recommendation to continue Vanc & de-escalate meropenem to ertapenem on 04/15 through 4/16. Patient completed IV course of UTI coverage. Patient tolerated volume removal well in dialysis  throughout the rest of her hospital stay. Pending discharge to [facility]ACH pending bed availability. Patient without BM with one episode of vomiting overnight 4/17. KUB with bowel gas and low concern for obstruction with no transition point noted. Escalating bowel regimen. Pending placement as of 4/22. Pulm consult placed to evaluate for possible trach downsize & eventual decannulation to assist placement if safe. Trach capping trial per pulm for 48h and will assess for decannulation on Monday. DVT studies negative. Pulm successfully decannulated pt 4/30, IR exchanged TDC. Pending swallow study with SLP and waiting for dispo options. Pt failed swallow study, placement pending. Working with PT on mobilize pt to sitting in a chair to expand placement options. Pt successfully mobilized using sandee lift but required 2 people to do so. Discussing dispo plan with family, likely d/c home if HD, DME needs able to be met. Pending acceptance at outpatient HD center. Ongoing placement difficulties d/t need for accepting HD facility to have sandee lift with 2 personnel to operate. Family meeting to discuss disposition options planned for Thursday 5/23 at 1 PM. SW onboard for placement to [facility] to continue dialysis. Once patient gets accepted at [facility], next step will be to work with daughter on alf NH placement. Hyperkalamic, given lokelma x1.     Interval History: Pending alf NH placement. Planned to continue HD today. Trending BMP - potassium.     Review of Systems   Unable to perform ROS: Patient nonverbal     Objective:     Vital Signs (Most Recent):  Temp: 97.7 °F (36.5 °C) (06/05/24 0725)  Pulse: 101 (06/05/24 0725)  Resp: 20 (06/05/24 0725)  BP: 128/85 (06/05/24 0725)  SpO2: 100 % (06/05/24 0725) Vital Signs (24h Range):  Temp:  [97.6 °F (36.4 °C)-98.6 °F (37 °C)] 97.7 °F (36.5 °C)  Pulse:  [] 101  Resp:  [17-20] 20  SpO2:  [98 %-100 %] 100 %  BP: (115-132)/(75-96) 128/85     Weight: 105.5 kg  (232 lb 9.4 oz)  Body mass index is 36.43 kg/m².  No intake or output data in the 24 hours ending 06/05/24 1015      Physical Exam  Vitals and nursing note reviewed.   Constitutional:       General: She is not in acute distress.     Appearance: She is not ill-appearing, toxic-appearing or diaphoretic.   HENT:      Head: Normocephalic and atraumatic.   Eyes:      General: No scleral icterus.        Right eye: No discharge.         Left eye: No discharge.      Conjunctiva/sclera: Conjunctivae normal.   Neck:      Comments: Decannulated  Cardiovascular:      Rate and Rhythm: Normal rate and regular rhythm.      Heart sounds: Normal heart sounds.   Pulmonary:      Effort: Pulmonary effort is normal. No respiratory distress.   Abdominal:      General: Abdomen is flat. There is no distension.      Tenderness: There is no abdominal tenderness.      Comments: PEG without signs of inflammation/bleeding     Musculoskeletal:      Right lower leg: No edema.      Left lower leg: No edema.   Skin:     General: Skin is warm and dry.   Neurological:      General: No focal deficit present.      Mental Status: She is alert.      Comments: Nonverbal             Significant Labs: All pertinent labs within the past 24 hours have been reviewed.    Significant Imaging: I have reviewed all pertinent imaging results/findings within the past 24 hours.    Assessment/Plan:      * Acute cystitis with hematuria  Resolved     Pt presenting with AMS and worsening lethargy. Pt is non-verbal at baseline, does not follow commands, but was less responsive than normal. Pt found to have a fever. Urinary source suspected based off of urine and CT abd/pelvis. Pt has many prior resistant bacterial infections including urinary sources. Has prior with sensitivity to zosyn and has also improved off ED dose of vanc/zosyn. Lactic elevated a 3.8->3.49 prior to completion of fluid bolus 500ml.    Patient with new fever and tachycardia on 4/25. Low threshold to  initiate additional infectious workup and repeat UA.     Plan  S/p course of vanc/ertapenem per ID. Course completed 4/16.  Daily cbc  Contact precautions    *on contact precautions indefinitely while patient still makes urine given pt incontinent per infection control    Vulvovaginal candidiasis  Noted 5/29, given 150mg fluconazole x1      Irritant contact dermatitis due friction or contact with other specified body fluids  Wound care following       Debility  Needs:  Wheelchair: patient has a mobility limitation that significantly impairs her ability to participate in one or more mobility related activities of daily living (MRADL's) such as toileting, feeding, dressing, grooming, and bathing in customary locations in the home.  The mobility limitation cannot be sufficiently resolved by the use of a cane or walker.   The use of a manual wheelchair will significantly improve the patient's ability to participate in MRADLS and the patient will use it on regular basis in the home.  Family/pt has expressed her willingness to use a manual wheelchair in the home.  She also has a caregiver who is capable of assisting in mobility.    Mrs Saravia requires a hospital bed due to her requiring positioning of the body in ways not feasible with an ordinary bed to alleviate pain/ is completely immobile /or limited mobility and cannot independently make changes in body position without the use of the bed.  The positioning of the body cannot be sufficiently resolved by the use of pillows and wedges    Patient has a mobility limitation that significantly impairs their ability to participate in one or more mobility related activities of daily living, including toileting. This deficit can be resolved by using a bedside commode. Patient demonstrates mobility limitations that will cause them to be confined to one room at home without bathroom access for up to 30 days. Using a bedside commode will greatly improve the patient's ability to  participate in MRADLs       Complication of vascular dialysis catheter  Cath intermittently clogging, not resolving with cath-hedy, going for IR venogram 4/30 with possible exchange. S/p exchange with IR on 4/30      Constipation  Resolved     Moderate malnutrition  Nutrition consulted. Most recent weight and BMI monitored-     Measurements:  Wt Readings from Last 1 Encounters:   06/04/24 105.5 kg (232 lb 9.4 oz)   Body mass index is 36.43 kg/m².    Patient has been screened and assessed by RD.    Malnutrition Type:  Context: acute illness or injury  Level: moderate    Malnutrition Characteristic Summary:  Weight Loss (Malnutrition): 10% in 6 months  Subcutaneous Fat (Malnutrition): mild depletion  Muscle Mass (Malnutrition): mild depletion  Fluid Accumulation (Malnutrition): mild    Interventions/Recommendations (treatment strategy):  1.    - on tube feeds   - previous history of failed swallow studies    Prolonged QT interval  Qtc 526 [04/10/24]      limit Qtc prolonging drugs as able    Spastic hemiplegia of right dominant side as late effect of cerebrovascular disease  Important that patient is able to sit in chair for 4h for dialysis placement needs, even if she requires lift/assistance getting into the chair     This patient has Chronic right hemiplegia due to stroke. Physical therapy services has been scheduled. Continue all standard measures for pressure injury prevention and consult wound care for any wounds (chronic or acute).    ESRD (end stage renal disease) on dialysis  Creatine stable for now. BMP reviewed- noted Estimated Creatinine Clearance: 10.8 mL/min (A) (based on SCr of 7.5 mg/dL (H)). according to latest data. Based on current GFR, CKD stage is end stage.  Monitor UOP and serial BMP and adjust therapy as needed. Renally dose meds. Avoid nephrotoxic medications and procedures.    SW onboard for placement to [facility] to continue dialysis. Once patient gets accepted at [facility], next step  will be to work with daughter on intermediate NH placement.     -- HD MWF (~1L fluid removal on average per session)  -- nephro following    Status post tracheostomy  Resolved, decannulated 4/30    Acute encephalopathy  Pt is non-verbal and does not follow commands at baseline. Vascular Neurology consulted given acute change from baseline. MRI with concern for evolution of prior strokes with noted differential of T2 hyperintensities including vasculitis vs demyelination. Discussed with Vascular Neurology; low concern for ongoing vasculitis/demyelination, likely represents typical evolution of prior infarcts.    Patient at baseline as of 4/22.     Resolved    Type 2 diabetes mellitus with hyperglycemia, with long-term current use of insulin  Adjusting insulin to account for diabetic TF    Patient's FSGs are controlled on current medication regimen.  Last A1c reviewed-   Lab Results   Component Value Date    HGBA1C 6.2 (H) 05/27/2024     Most recent fingerstick glucose reviewed-   Recent Labs   Lab 06/04/24  1203 06/04/24  1720 06/04/24  2147   POCTGLUCOSE 189* 157* 128*       Current correctional scale  Medium  Maintain anti-hyperglycemic dose as follows-   Antihyperglycemics (From admission, onward)      Start     Stop Route Frequency Ordered    05/27/24 1633  insulin aspart U-100 pen 0-5 Units         -- SubQ Before meals & nightly PRN 05/27/24 1533    05/25/24 2100  insulin glargine U-100 (Lantus) pen 10 Units         -- SubQ Nightly 05/25/24 1035          Hold Oral hypoglycemics while patient is in the hospital.  POCT glucose q6h    Hyponatremia  Patient has hyponatremia which is uncontrolled,We will aim to correct the sodium by 4-6mEq in 24 hours. We will monitor sodium Daily. The hyponatremia is due to ESRD. Discussed with nephrology, dialysate bath adjusted to account for and correct hyponatremia.  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 Pt underwent extensive course, currently still healing from this, but no longer has wound vac. Pt's wound currently covered with bandage. Picture in media tabs    Plan  Wound care        VTE Risk Mitigation (From admission, onward)           Ordered     heparin (porcine) injection 5,000 Units  Every 8 hours         05/29/24 0823     heparin (porcine) injection 1,000 Units  As needed (PRN)         05/21/24 1017     heparin (porcine) injection 3,000 Units  As needed (PRN)         04/17/24 0825                    Discharge Planning   ZEKE: 6/7/2024     Code Status: Full Code   Is the patient medically ready for discharge?: No    Reason for patient still in hospital (select all that apply): Patient trending condition  Discharge Plan A: New Nursing Home placement - alf care facility          Steven Miranda MD  Department of Hospital Medicine   [facility] - Telemetry Stepdown

## 2024-06-05 NOTE — PLAN OF CARE
Problem: Infection  Goal: Absence of Infection Signs and Symptoms  Outcome: Progressing     Problem: Skin Injury Risk Increased  Goal: Skin Health and Integrity  Outcome: Progressing     Problem: Adult Inpatient Plan of Care  Goal: Plan of Care Review  Outcome: Progressing

## 2024-06-05 NOTE — PLAN OF CARE
Message received from Nephrology SW informing this CM that patient has been accepted to [facility]. Call placed to patient's daughter Aleks ([phone]) informing her of above conversation. She expressed understanding and stated that she would contact [facility] [place] to begin completing admission paperwork. IM4 Team made aware of above conversation. Will continue to follow.    Sara Loredo RN  Ext [phone]

## 2024-06-05 NOTE — PLAN OF CARE
Problem: Adult Inpatient Plan of Care  Goal: Plan of Care Review  Outcome: Progressing     Problem: Skin Injury Risk Increased  Goal: Skin Health and Integrity  Outcome: Progressing  Intervention: Optimize Skin Protection  Flowsheets (Taken 6/5/2024 1710)  Pressure Reduction Techniques:   frequent weight shift encouraged   weight shift assistance provided  Pressure Reduction Devices:   foam padding utilized   pressure-redistributing mattress utilized   specialty bed utilized   heel offloading device utilized  Skin Protection:   incontinence pads utilized   protective footwear used  Activity Management: Rolling - L1  Head of Bed (HOB) Positioning: HOB at 30-45 degrees     POC reviewed. Alert. Unable to assess orientation because non-verbal response.  HD done removed 2L. Void x2. Wound care performed to right groin. No s/s of distress or discomfort. Frequent safety checks performed. Bed in lowest position.

## 2024-06-05 NOTE — ASSESSMENT & PLAN NOTE
Creatine stable for now. BMP reviewed- noted Estimated Creatinine Clearance: 10.8 mL/min (A) (based on SCr of 7.5 mg/dL (H)). according to latest data. Based on current GFR, CKD stage is end stage.  Monitor UOP and serial BMP and adjust therapy as needed. Renally dose meds. Avoid nephrotoxic medications and procedures.    SW onboard for placement to [facility] to continue dialysis. Once patient gets accepted at [facility], next step will be to work with daughter on assisted NH placement.     -- HD MWF (~1L fluid removal on average per session)  -- nephro following

## 2024-06-05 NOTE — PLAN OF CARE
[facility] - Telemetry Stepdown  Discharge Reassessment    Primary Care Provider: No, Primary Doctor    Expected Discharge Date: 6/7/2024    Reassessment (most recent)       Discharge Reassessment - 06/05/24 1506          Discharge Reassessment    Assessment Type Discharge Planning Reassessment     Did the patient's condition or plan change since previous assessment? No     Discharge Plan discussed with: Adult children     Communicated ZEKE with patient/caregiver Yes     Discharge Plan A New Nursing Home placement - detention care facility     Discharge Plan B New Nursing Home placement - detention care facility     DME Needed Upon Discharge  other (see comments)   TBD    Why the patient remains in the hospital Placement issues        Post-Acute Status    Post-Acute Authorization Placement     Post-Acute Placement Status Referrals Sent     Diaylsis Status Set-up Complete/Auth obtained   [facility]                  Discharge Plan A and Plan B have been determined by review of patient's clinical status, future medical and therapeutic needs, and coverage/benefits for post-acute care in coordination with multidisciplinary team members.     Sara Loredo RN  Ext [phone]

## 2024-06-05 NOTE — PROGRESS NOTES
3.5hr HD treatment completed 2L of fluid removed pt tolerated well. Both lumens of  RIJ permacth instilled with 1.6cc of heparin capped and wrapped in gauze. Central line dressing changed per protocol due to pt pulling dressing off. Report given to primary nurse at bedside.

## 2024-06-05 NOTE — ASSESSMENT & PLAN NOTE
Patient has hyponatremia which is uncontrolled,We will aim to correct the sodium by 4-6mEq in 24 hours. We will monitor sodium Daily. The hyponatremia is due to ESRD. Discussed with nephrology, dialysate bath adjusted to account for and correct hyponatremia.  Recent Labs   Lab 06/05/24  0658   *     IMPROVING  - Daily morning CMP  - Continue to montior

## 2024-06-05 NOTE — PT/OT/SLP PROGRESS
Physical Therapy Treatment    Patient Name:  Sarah Saravia   MRN:  [id number]    Recommendations:     Discharge Recommendations: Moderate Intensity Therapy  Discharge Equipment Recommendations: hospital bed, lift device, wheelchair  Barriers to discharge: Pt requiring increased skilled assistance at current time.     Assessment:     Sarah Saravia is a 53 y.o. female admitted with a medical diagnosis of Acute cystitis with hematuria.  She presents with the following impairments/functional limitations: weakness, impaired endurance, impaired self care skills, impaired functional mobility, impaired balance, impaired cognition, decreased coordination, decreased upper extremity function, decreased lower extremity function, decreased safety awareness, decreased ROM, impaired coordination, impaired skin requiring total assistance of 2 helpers and verbal cues for bed mob, scooting to EOB/HOB, sit < > stand transitions due to weakness, fear of falling limited ROM, pain, fear of falling.   In light of pt's current functional level and deficits, it is anticipated that pt will need to participate in a moderate intensity rehab program consisting of PT and OT in order to achieve full rehab potential to return to previous level of function and roles.  Pt remains motivated to participate in PT session and will cont to benefit from skilled PT intervention..    Rehab Prognosis: Good; patient would benefit from acute skilled PT services to address these deficits and reach maximum level of function.    Recent Surgery: * No surgery found *      Plan:     During this hospitalization, patient to be seen 3 x/week to address the identified rehab impairments via gait training, therapeutic activities, therapeutic exercises, neuromuscular re-education and progress toward the following goals:    Plan of Care Expires:  06/22/24    Subjective     Chief Complaint: fear of falling  Pain/Comfort:  Pain Rating 1: 0/10  Pain Rating Post-Intervention  1: 0/10      Objective:     Communicated with nurse (Chelsi) prior to session.  Patient found HOB elevated at 30* angle with PEG Tube (friend present) upon PT entry to room.     General Precautions: Standard, aphasia, aspiration, fall, NPO  Orthopedic Precautions: N/A  Braces: N/A  Respiratory Status: Room air     Functional Mobility:  Bed Mobility:     Rolling Left:  total assistance and of 2 persons  Rolling Right: total assistance and of 2 persons  Scooting: anteriorly to the EOB with max A/total A of 1 helper, using drawsheet; to HOB dependent of 2 via drawsheet  Supine to Sit: max A of 2 for trunk elevation and LE's, exiting on the R side, HOB at 20* angle, increased time  Sit to Supine: total A of 2 for trunk and LE's, HOB flat  Transfers:     Sit to Stand:  NA this session due to pt fatigue  Balance: static/dynamic sitting at the EOB with SBA/close sup approx 20 min      AM-PAC 6 CLICK MOBILITY  Turning over in bed (including adjusting bedclothes, sheets and blankets)?: 2  Sitting down on and standing up from a chair with arms (e.g., wheelchair, bedside commode, etc.): 1  Moving from lying on back to sitting on the side of the bed?: 2  Moving to and from a bed to a chair (including a wheelchair)?: 1  Need to walk in hospital room?: 1  Climbing 3-5 steps with a railing?: 1  Basic Mobility Total Score: 8       Treatment & Education:  Patient provided with daily orientation and goals of this PT session. They were educated to call for assistance and to transfer with hospital staff only.  Also, pt was educated on the effects of prolonged immobility and the importance of performing OOB activity and exercises to promote healing and reduce recovery time    Patient left HOB elevated with all lines intact, call button in reach, nurse notified, and friend present..    GOALS:   Multidisciplinary Problems       Physical Therapy Goals          Problem: Physical Therapy    Goal Priority Disciplines Outcome Goal Variances  Interventions   Physical Therapy Goal     PT, PT/OT Progressing     Description: Goals to be met by: 24   Goals remain appropriate 5/3/2024 to be met by 24  Goals updated 2024 to be met by 24  Goals updated 24 to be met by 24    Patient will increase functional independence with mobility by performin. Supine to sit with Maximum Assistance  2. Sit to supine with Maximum Assistance  3. Rolling to Left and Right with Moderate Assistance.  4. Sitting at edge of bed 5 minutes with Moderate Assistance- MET , monitor consistency  4a. Sitting at edge of bed 10 minutes with Supervision  5. Lower extremity exercise program x20 reps per handout, with assistance as needed  6. Sit to stand transfer with Maximum Assistance with or without LRAD  7. Stand for 2 minutes with Maximum Assistance with or without LRAD                         Time Tracking:     PT Received On: 24  PT Start Time: 909     PT Stop Time: 937  PT Total Time (min): 28 min     Billable Minutes: Therapeutic Activity 28    Treatment Type: Treatment  PT/PTA: PTA     Number of PTA visits since last PT visit: 2     2024

## 2024-06-05 NOTE — SUBJECTIVE & OBJECTIVE
Interval History: Pending correction NH placement. Planned to continue HD today. Trending BMP - potassium.     Review of Systems   Unable to perform ROS: Patient nonverbal     Objective:     Vital Signs (Most Recent):  Temp: 97.7 °F (36.5 °C) (06/05/24 0725)  Pulse: 101 (06/05/24 0725)  Resp: 20 (06/05/24 0725)  BP: 128/85 (06/05/24 0725)  SpO2: 100 % (06/05/24 0725) Vital Signs (24h Range):  Temp:  [97.6 °F (36.4 °C)-98.6 °F (37 °C)] 97.7 °F (36.5 °C)  Pulse:  [] 101  Resp:  [17-20] 20  SpO2:  [98 %-100 %] 100 %  BP: (115-132)/(75-96) 128/85     Weight: 105.5 kg (232 lb 9.4 oz)  Body mass index is 36.43 kg/m².  No intake or output data in the 24 hours ending 06/05/24 1015      Physical Exam  Vitals and nursing note reviewed.   Constitutional:       General: She is not in acute distress.     Appearance: She is not ill-appearing, toxic-appearing or diaphoretic.   HENT:      Head: Normocephalic and atraumatic.   Eyes:      General: No scleral icterus.        Right eye: No discharge.         Left eye: No discharge.      Conjunctiva/sclera: Conjunctivae normal.   Neck:      Comments: Decannulated  Cardiovascular:      Rate and Rhythm: Normal rate and regular rhythm.      Heart sounds: Normal heart sounds.   Pulmonary:      Effort: Pulmonary effort is normal. No respiratory distress.   Abdominal:      General: Abdomen is flat. There is no distension.      Tenderness: There is no abdominal tenderness.      Comments: PEG without signs of inflammation/bleeding     Musculoskeletal:      Right lower leg: No edema.      Left lower leg: No edema.   Skin:     General: Skin is warm and dry.   Neurological:      General: No focal deficit present.      Mental Status: She is alert.      Comments: Nonverbal             Significant Labs: All pertinent labs within the past 24 hours have been reviewed.    Significant Imaging: I have reviewed all pertinent imaging results/findings within the past 24 hours.

## 2024-06-05 NOTE — PROGRESS NOTES
Please see previous notes from this SW for continuity.    __________________________________________________  KARI received notification from [facility] clinic manager Jaimee, pt accepted for outpt dialysis at this unit. Jaimee informed she will contact [facility] to update that pt was accepted.    Inpt treatment team updated via secure chat.    KARI to continue to follow with updates.    Dafne Mccord, GAYLA  [facility]  X [phone]

## 2024-06-05 NOTE — PROGRESS NOTES
Bedside HD treatment started via [facility] permcath BFR@400. Heparin bolus given as ordered. Report received from primary nurse at bedside.

## 2024-06-05 NOTE — PLAN OF CARE
Problem: Infection  Goal: Absence of Infection Signs and Symptoms  6/4/2024 1902 by Mary Powell RN  Outcome: Progressing  6/4/2024 1901 by Mary Powell RN  Outcome: Progressing     Problem: Skin Injury Risk Increased  Goal: Skin Health and Integrity  6/4/2024 1902 by Mary Powell RN  Outcome: Progressing  6/4/2024 1901 by Mary Powell RN  Outcome: Progressing     Problem: Adult Inpatient Plan of Care  Goal: Plan of Care Review  6/4/2024 1902 by Mary Powell RN  Outcome: Progressing  6/4/2024 1901 by Mary Powell RN  Outcome: Progressing

## 2024-06-05 NOTE — NURSING
Nurses Note -- 4 Eyes      6/4/24 2000      Skin assessed during: Q Shift Change      [x] No Altered Skin Integrity Present    []Prevention Measures Documented      [] Yes- Altered Skin Integrity Present or Discovered   [] LDA Added if Not in Epic (Describe Wound)   [] New Altered Skin Integrity was Present on Admit and Documented in LDA   [] Wound Image Taken    Wound Care Consulted? No    Attending Nurse:  ELISABETH Hampton    Second RN/Staff Member:  GONZALEZ Noriega

## 2024-06-05 NOTE — PLAN OF CARE
PT alert and oriented x 4. Able to voice all wants and needs. All needs met.  She has remained free of falls and injuries. Safety eduction and plan of care reviewed with PT and she voiced understanding. Bed is low and locked with call light with in easy reach.

## 2024-06-05 NOTE — ASSESSMENT & PLAN NOTE
Adjusting insulin to account for diabetic TF    Patient's FSGs are controlled on current medication regimen.  Last A1c reviewed-   Lab Results   Component Value Date    HGBA1C 6.2 (H) 05/27/2024     Most recent fingerstick glucose reviewed-   Recent Labs   Lab 06/04/24  1203 06/04/24  1720 06/04/24  2147   POCTGLUCOSE 189* 157* 128*       Current correctional scale  Medium  Maintain anti-hyperglycemic dose as follows-   Antihyperglycemics (From admission, onward)    Start     Stop Route Frequency Ordered    05/27/24 1633  insulin aspart U-100 pen 0-5 Units         -- SubQ Before meals & nightly PRN 05/27/24 1533    05/25/24 2100  insulin glargine U-100 (Lantus) pen 10 Units         -- SubQ Nightly 05/25/24 1035        Hold Oral hypoglycemics while patient is in the hospital.  POCT glucose q6h

## 2024-06-06 LAB
ANION GAP SERPL CALC-SCNC: 15 MMOL/L (ref 8–16)
BUN SERPL-MCNC: 34 MG/DL (ref 6–20)
CALCIUM SERPL-MCNC: 10.5 MG/DL (ref 8.7–10.5)
CHLORIDE SERPL-SCNC: 92 MMOL/L (ref 95–110)
CO2 SERPL-SCNC: 24 MMOL/L (ref 23–29)
CREAT SERPL-MCNC: 5.5 MG/DL (ref 0.5–1.4)
EST. GFR  (NO RACE VARIABLE): 8.7 ML/MIN/1.73 M^2
GLUCOSE SERPL-MCNC: 164 MG/DL (ref 70–110)
POCT GLUCOSE: 186 MG/DL (ref 70–110)
POCT GLUCOSE: 188 MG/DL (ref 70–110)
POCT GLUCOSE: 204 MG/DL (ref 70–110)
POCT GLUCOSE: 215 MG/DL (ref 70–110)
POTASSIUM SERPL-SCNC: 4.9 MMOL/L (ref 3.5–5.1)
SODIUM SERPL-SCNC: 131 MMOL/L (ref 136–145)

## 2024-06-06 PROCEDURE — 92526 ORAL FUNCTION THERAPY: CPT

## 2024-06-06 PROCEDURE — 97535 SELF CARE MNGMENT TRAINING: CPT

## 2024-06-06 PROCEDURE — 25000003 PHARM REV CODE 250

## 2024-06-06 PROCEDURE — 97530 THERAPEUTIC ACTIVITIES: CPT

## 2024-06-06 PROCEDURE — 63600175 PHARM REV CODE 636 W HCPCS

## 2024-06-06 PROCEDURE — 27000207 HC ISOLATION

## 2024-06-06 PROCEDURE — 20600001 HC STEP DOWN PRIVATE ROOM

## 2024-06-06 PROCEDURE — 36415 COLL VENOUS BLD VENIPUNCTURE: CPT

## 2024-06-06 PROCEDURE — 80048 BASIC METABOLIC PNL TOTAL CA: CPT

## 2024-06-06 RX ORDER — SODIUM CHLORIDE 9 MG/ML
INJECTION, SOLUTION INTRAVENOUS ONCE
Status: COMPLETED | OUTPATIENT
Start: 2024-06-07 | End: 2024-06-07

## 2024-06-06 RX ORDER — INSULIN GLARGINE 100 [IU]/ML
10 INJECTION, SOLUTION SUBCUTANEOUS NIGHTLY
Start: 2024-06-06 | End: 2024-06-17

## 2024-06-06 RX ORDER — INSULIN ASPART 100 [IU]/ML
0-5 INJECTION, SOLUTION INTRAVENOUS; SUBCUTANEOUS EVERY 6 HOURS PRN
Start: 2024-06-06 | End: 2025-06-06

## 2024-06-06 RX ADMIN — PANTOPRAZOLE SODIUM 40 MG: 40 GRANULE, DELAYED RELEASE ORAL at 09:06

## 2024-06-06 RX ADMIN — SODIUM ZIRCONIUM CYCLOSILICATE 10 G: 5 POWDER, FOR SUSPENSION ORAL at 09:06

## 2024-06-06 RX ADMIN — Medication 500 MG: at 09:06

## 2024-06-06 RX ADMIN — ASPIRIN 81 MG CHEWABLE TABLET 81 MG: 81 TABLET CHEWABLE at 09:06

## 2024-06-06 RX ADMIN — INSULIN ASPART 2 UNITS: 100 INJECTION, SOLUTION INTRAVENOUS; SUBCUTANEOUS at 12:06

## 2024-06-06 RX ADMIN — HEPARIN SODIUM 5000 UNITS: 5000 INJECTION INTRAVENOUS; SUBCUTANEOUS at 02:06

## 2024-06-06 RX ADMIN — INSULIN GLARGINE 10 UNITS: 100 INJECTION, SOLUTION SUBCUTANEOUS at 09:06

## 2024-06-06 RX ADMIN — METOPROLOL TARTRATE 25 MG: 25 TABLET, FILM COATED ORAL at 09:06

## 2024-06-06 RX ADMIN — ATORVASTATIN CALCIUM 40 MG: 40 TABLET, FILM COATED ORAL at 09:06

## 2024-06-06 RX ADMIN — HEPARIN SODIUM 5000 UNITS: 5000 INJECTION INTRAVENOUS; SUBCUTANEOUS at 09:06

## 2024-06-06 RX ADMIN — HEPARIN SODIUM 5000 UNITS: 5000 INJECTION INTRAVENOUS; SUBCUTANEOUS at 06:06

## 2024-06-06 NOTE — PLAN OF CARE
Call placed to [facility] ([phone]) to check on the status of patient's admission. They informed this CM that now that dialysis has been confirmed with [facility] they will contact patient's daughter Aleks to make arrangements for admission paperwork for retirement placement. Will continue to follow.    Sara Loredo RN  Ext [phone]

## 2024-06-06 NOTE — PLAN OF CARE
Problem: Infection  Goal: Absence of Infection Signs and Symptoms  Outcome: Progressing     Problem: Skin Injury Risk Increased  Goal: Skin Health and Integrity  Outcome: Progressing     Problem: Adult Inpatient Plan of Care  Goal: Plan of Care Review  Outcome: Progressing     POC reviewed. Resting quietly in bed. Tube-feeding in progress. Remain NPO.  Wound care performed. Reposition q2. Frequent safety checks. Bed in lowest position.  No s/s of distress.

## 2024-06-06 NOTE — NURSING
Nurses Note -- 4 Eyes    06/05/24  715AM      Skin assessed during: Q Shift Change      [] No Altered Skin Integrity Present    []Prevention Measures Documented      [x] Yes- Altered Skin Integrity Present or Discovered   [] LDA Added if Not in Epic (Describe Wound)   [x] New Altered Skin Integrity was Present on Admit and Documented in LDA   [] Wound Image Taken    Wound Care Consulted? No    Attending Nurse:  Chelsi Eastman RN/Staff Member:  DAREN

## 2024-06-06 NOTE — PLAN OF CARE
PT alert and oriented x 4. Able to voice all wants and needs. All needs met. No significant events overnight.  She has remained free of falls and injuries. Safety eduction and plan of care reviewed with PT.  Bed is low and locked with call light with in easy reach.  Bed alarm set and alarm is audible.

## 2024-06-06 NOTE — PT/OT/SLP PROGRESS
Speech Language Pathology Treatment    Patient Name:  Sarah Saravia   MRN:  [id number]  Admitting Diagnosis: Acute cystitis with hematuria    Recommendations:                 General Recommendations:  Dysphagia therapy and Speech/language therapy  Diet recommendations:  NPO, Liquid Diet Level: NPO OK for thin liquids for pleasure  Aspiration Precautions: Continue alternate means of nutrition, Frequent oral care, HOB to 90 degrees, Monitor for s/s of aspiration, and Strict aspiration precautions   General Precautions: Standard, aphasia, aspiration, fall, NPO  Communication strategies:  yes/no questions only and go to room if call light pushed    Assessment:     Sarah Saravia is a 53 y.o. female with an SLP diagnosis of Aphasia, Dysphagia, and Cognitive-Linguistic Impairment.      Subjective     Pt remained nonverbal.    Pain/Comfort:  Pain Rating 1: 0/10    Respiratory Status: Room air    Objective:     Has the patient been evaluated by SLP for swallowing?   Yes  Keep patient NPO? Yes   Current Respiratory Status:        Pt accepted PO trials of apple jucie via straw (approx 3oz consumed) x 9 and 1/2 tsp bites of pudding x 3.  Minimal delay in initiation of swallow for thin liquids, but prolonged holding noted for purees (>1 minute for 2 out of 3 puree trials). Sip of juice was required to initiate swallow response for those pureed trials. No overt s/s of aspiration observed.  SLP continues to recommend thin liquids for pleasure only, but no other consistencies outside of SLP therapeutic trials at this time.     Goals:   Multidisciplinary Problems       SLP Goals          Problem: SLP    Goal Priority Disciplines Outcome   SLP Goal     SLP    Description: Speech Language Pathology Goals  Updated goals expected to be met by 5/10 (goals remain appropriate 5/27 - reassess on 6/3:  1. Pt will participate in ongoing swallowing assessment to determine if appropriate for PO trials for pleasure.   2. Pt will model single  step command x 1 given max cues.   3. Pt will answer simple yes/no q's during a structured receptive language task x 1 given max cues.   4. Pt will phonate purposefully upon command x 1 given max cues.   5. Pt will attempt to vocalize/verbalize during automatic speech task x 1 given max cues.   6. Pt will participate in evaluation of ability to utilize basic communication board.     Goals expected to be met by 5/8:  1. Pt will participate in Modified Barium Swallow Study to determine if safe for oral intake. Goal met/attempted 5/2                               Plan:     Patient to be seen:  3 x/week   Plan of Care expires:  05/31/24  Plan of Care reviewed with:  patient   SLP Follow-Up:  Yes       Discharge recommendations:  Moderate Intensity Therapy   Time Tracking:     SLP Treatment Date:   06/06/24  Speech Start Time:  1028  Speech Stop Time:  1042     Speech Total Time (min):  14 min    Billable Minutes: Treatment Swallowing Dysfunction 14    06/06/2024

## 2024-06-06 NOTE — PROGRESS NOTES
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 History was given from patient's daughter. She was undergoing dialysis today and noticed she was lethargic, less alert than usual self. Pt completed dialysis and still not acting herself. EMS was called, fever of a 100.  On chart review, she did have an episode of large volume emesis around 1700.  Per EMS, she had a slightly elevated temp 100.0°F, glucose 300s.     In the ED: UA 2+ leuks, >100 WBC, many bacteria, WBC 17, CT abd/pelvis concerning for cystitis. Given vanc/zosyn    Overview/Hospital Course:  Patient was admitted to Hospital Medicine service for medical management and evaluation of urosepsis. Patient was continued on vanc/zosyn. Vascular Neurology consulted concerning mental status change with the recommendation to initiate ASA 81 QD and to obtain an MRI to r/o new stroke. Imaging pending. Nephrology was consulted for regularly scheduled dialysis. Afternoon of 4/12, patient was unable to tolerate filtration of volume during dialsis 2/2 hypotension. Patient received 500cc bolus and was transferred back to floor after finishing the session without volume removed. Will monitor for signs of volume overload. Tailoring insulin regimen while uptitrating tube feeds to goal rate. Staph Epi in all 4 bottles; ID with recommendation to continue Vanc & de-escalate meropenem to ertapenem on 04/15 through 4/16. Patient completed IV course of UTI coverage. Patient tolerated volume removal well in dialysis  throughout the rest of her hospital stay. Pending discharge to [facility] pending bed availability. Patient without BM with one episode of vomiting overnight 4/17. KUB with bowel gas and low concern for obstruction with no transition point noted. Escalating bowel regimen. Pending placement as of 4/22. Pulm consult placed to evaluate for possible trach downsize & eventual decannulation to assist placement if safe. Trach capping trial per pulm for 48h and will assess for decannulation on Monday. DVT studies negative. Pulm successfully decannulated pt 4/30, IR exchanged TDC. Pending swallow study with SLP and waiting for dispo options. Pt failed swallow study, placement pending. Working with PT on mobilize pt to sitting in a chair to expand placement options. Pt successfully mobilized using sandee lift but required 2 people to do so. Discussing dispo plan with family, likely d/c home if HD, DME needs able to be met. Pending acceptance at outpatient HD center. Ongoing placement difficulties d/t need for accepting HD facility to have sandee lift with 2 personnel to operate. Family meeting to discuss disposition options planned for Thursday 5/23 at 1 PM. SW onboard for placement to [facility] to continue dialysis. Once patient gets accepted at [facility], next step will be to work with daughter on skilled nursing NH placement. Hyperkalamic, given lokelma x1.     Interval History: NAEON. Awaiting placement      Objective:     Vital Signs (Most Recent):  Temp: 98.8 °F (37.1 °C) (06/06/24 1212)  Pulse: 97 (06/06/24 1212)  Resp: 18 (06/06/24 1212)  BP: 120/70 (06/06/24 1212)  SpO2: 99 % (06/06/24 1212) Vital Signs (24h Range):  Temp:  [98.2 °F (36.8 °C)-99.7 °F (37.6 °C)] 98.8 °F (37.1 °C)  Pulse:  [] 97  Resp:  [17-18] 18  SpO2:  [98 %-100 %] 99 %  BP: (108-133)/(66-91) 120/70     Weight: 105.5 kg (232 lb 9.4 oz)  Body mass index is 36.43 kg/m².    Intake/Output Summary (Last 24 hours) at 6/6/2024 1321  Last data filed at  6/6/2024 1000  Gross per 24 hour   Intake 750 ml   Output 2650 ml   Net -1900 ml         Physical Exam  Vitals and nursing note reviewed.   Constitutional:       General: She is not in acute distress.     Appearance: She is not ill-appearing, toxic-appearing or diaphoretic.   HENT:      Head: Normocephalic and atraumatic.   Eyes:      General: No scleral icterus.        Right eye: No discharge.         Left eye: No discharge.      Conjunctiva/sclera: Conjunctivae normal.   Neck:      Comments: Decannulated  Cardiovascular:      Rate and Rhythm: Normal rate and regular rhythm.      Heart sounds: Normal heart sounds.   Pulmonary:      Effort: Pulmonary effort is normal. No respiratory distress.   Abdominal:      General: Abdomen is flat. There is no distension.      Tenderness: There is no abdominal tenderness.      Comments: PEG without signs of inflammation/bleeding     Musculoskeletal:      Right lower leg: No edema.      Left lower leg: No edema.   Skin:     General: Skin is warm and dry.   Neurological:      General: No focal deficit present.      Mental Status: She is alert.      Comments: Nonverbal             Significant Labs: All pertinent labs within the past 24 hours have been reviewed.    Significant Imaging: I have reviewed all pertinent imaging results/findings within the past 24 hours.    Assessment/Plan:      * Acute cystitis with hematuria  Resolved     Pt presenting with AMS and worsening lethargy. Pt is non-verbal at baseline, does not follow commands, but was less responsive than normal. Pt found to have a fever. Urinary source suspected based off of urine and CT abd/pelvis. Pt has many prior resistant bacterial infections including urinary sources. Has prior with sensitivity to zosyn and has also improved off ED dose of vanc/zosyn. Lactic elevated a 3.8->3.49 prior to completion of fluid bolus 500ml.    Patient with new fever and tachycardia on 4/25. Low threshold to initiate additional infectious  workup and repeat UA.     Plan  S/p course of vanc/ertapenem per ID. Course completed 4/16.  Daily cbc  Contact precautions    *on contact precautions indefinitely while patient still makes urine given pt incontinent per infection control    Vulvovaginal candidiasis  Noted 5/29, given 150mg fluconazole x1      Irritant contact dermatitis due friction or contact with other specified body fluids  Wound care following       Debility  Needs:  Wheelchair: patient has a mobility limitation that significantly impairs her ability to participate in one or more mobility related activities of daily living (MRADL's) such as toileting, feeding, dressing, grooming, and bathing in customary locations in the home.  The mobility limitation cannot be sufficiently resolved by the use of a cane or walker.   The use of a manual wheelchair will significantly improve the patient's ability to participate in MRADLS and the patient will use it on regular basis in the home.  Family/pt has expressed her willingness to use a manual wheelchair in the home.  She also has a caregiver who is capable of assisting in mobility.    Mrs Saravia requires a hospital bed due to her requiring positioning of the body in ways not feasible with an ordinary bed to alleviate pain/ is completely immobile /or limited mobility and cannot independently make changes in body position without the use of the bed.  The positioning of the body cannot be sufficiently resolved by the use of pillows and wedges    Patient has a mobility limitation that significantly impairs their ability to participate in one or more mobility related activities of daily living, including toileting. This deficit can be resolved by using a bedside commode. Patient demonstrates mobility limitations that will cause them to be confined to one room at home without bathroom access for up to 30 days. Using a bedside commode will greatly improve the patient's ability to participate in MRADLs        Complication of vascular dialysis catheter  Cath intermittently clogging, not resolving with cath-hedy, going for IR venogram 4/30 with possible exchange. S/p exchange with IR on 4/30      Constipation  Resolved     Moderate malnutrition  Nutrition consulted. Most recent weight and BMI monitored-     Measurements:  Wt Readings from Last 1 Encounters:   06/04/24 105.5 kg (232 lb 9.4 oz)   Body mass index is 36.43 kg/m².    Patient has been screened and assessed by RD.    Malnutrition Type:  Context: acute illness or injury  Level: moderate    Malnutrition Characteristic Summary:  Weight Loss (Malnutrition): 10% in 6 months  Subcutaneous Fat (Malnutrition): mild depletion  Muscle Mass (Malnutrition): mild depletion  Fluid Accumulation (Malnutrition): mild    Interventions/Recommendations (treatment strategy):  1.    - on tube feeds   - previous history of failed swallow studies    Prolonged QT interval  Qtc 526 [04/10/24]      limit Qtc prolonging drugs as able    Spastic hemiplegia of right dominant side as late effect of cerebrovascular disease  Important that patient is able to sit in chair for 4h for dialysis placement needs, even if she requires lift/assistance getting into the chair     This patient has Chronic right hemiplegia due to stroke. Physical therapy services has been scheduled. Continue all standard measures for pressure injury prevention and consult wound care for any wounds (chronic or acute).    ESRD (end stage renal disease) on dialysis  Creatine stable for now. BMP reviewed- noted Estimated Creatinine Clearance: 10.8 mL/min (A) (based on SCr of 7.5 mg/dL (H)). according to latest data. Based on current GFR, CKD stage is end stage.  Monitor UOP and serial BMP and adjust therapy as needed. Renally dose meds. Avoid nephrotoxic medications and procedures.    SW onboard for placement to [facility] to continue dialysis. Once patient gets accepted at [facility], next step will be to work with  daughter on halfway NH placement.     -- HD MWF (~1L fluid removal on average per session)  -- nephro following    Status post tracheostomy  Resolved, decannulated 4/30    Acute encephalopathy  Pt is non-verbal and does not follow commands at baseline. Vascular Neurology consulted given acute change from baseline. MRI with concern for evolution of prior strokes with noted differential of T2 hyperintensities including vasculitis vs demyelination. Discussed with Vascular Neurology; low concern for ongoing vasculitis/demyelination, likely represents typical evolution of prior infarcts.    Patient at baseline as of 4/22.     Resolved    Type 2 diabetes mellitus with hyperglycemia, with long-term current use of insulin  Adjusting insulin to account for diabetic TF    Patient's FSGs are controlled on current medication regimen.  Last A1c reviewed-   Lab Results   Component Value Date    HGBA1C 6.2 (H) 05/27/2024     Most recent fingerstick glucose reviewed-   Recent Labs   Lab 06/04/24  1203 06/04/24  1720 06/04/24  2147   POCTGLUCOSE 189* 157* 128*       Current correctional scale  Medium  Maintain anti-hyperglycemic dose as follows-   Antihyperglycemics (From admission, onward)      Start     Stop Route Frequency Ordered    05/27/24 1633  insulin aspart U-100 pen 0-5 Units         -- SubQ Before meals & nightly PRN 05/27/24 1533    05/25/24 2100  insulin glargine U-100 (Lantus) pen 10 Units         -- SubQ Nightly 05/25/24 1035          Hold Oral hypoglycemics while patient is in the hospital.  POCT glucose q6h    Hyponatremia  Patient has hyponatremia which is uncontrolled,We will aim to correct the sodium by 4-6mEq in 24 hours. We will monitor sodium Daily. The hyponatremia is due to ESRD. Discussed with nephrology, dialysate bath adjusted to account for and correct hyponatremia.  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 Pt underwent extensive course, currently still healing from this, but no longer has wound vac. Pt's wound currently covered with bandage. Picture in media tabs    Plan  Wound care        VTE Risk Mitigation (From admission, onward)           Ordered     heparin (porcine) injection 5,000 Units  Every 8 hours         05/29/24 0823     heparin (porcine) injection 1,000 Units  As needed (PRN)         05/21/24 1017     heparin (porcine) injection 3,000 Units  As needed (PRN)         04/17/24 0825                    Discharge Planning   ZEKE: 6/10/2024     Code Status: Full Code   Is the patient medically ready for discharge?: No    Reason for patient still in hospital (select all that apply): Pending disposition  Discharge Plan A: New Nursing Home placement - snf care facility                  Jarrett Eason DO  Department of Hospital Medicine   [facility] - Telemetry Stepdown

## 2024-06-06 NOTE — PT/OT/SLP PROGRESS
Occupational Therapy   Treatment    Name: Sarah Saravia  MRN: [id number]  Admitting Diagnosis:  Acute cystitis with hematuria       Recommendations:     Discharge Recommendations: Moderate Intensity Therapy  Discharge Equipment Recommendations:  wheelchair, hospital bed, lift device  Barriers to discharge:  Other (Comment) (requires significant assist)    Assessment:     Sarah Saravia is a 53 y.o. female with a medical diagnosis of Acute cystitis with hematuria.  She presents with deficits in mobility requiring significant assist for bed mobility but only SBA to sit EOB for ADL tasks and therapeutic activities. Pt. With no verbalizations on this date but did gesture some during session. Patient continues to participate well in familiar tasks.  Patient would benefit from continued OT services to maximize level of safety and independence with self-care tasks.   . Performance deficits affecting function are weakness, impaired endurance, impaired self care skills, impaired functional mobility, impaired cognition, decreased safety awareness, decreased coordination, decreased upper extremity function, decreased lower extremity function.     Rehab Prognosis:  Good; patient would benefit from acute skilled OT services to address these deficits and reach maximum level of function.       Plan:     Patient to be seen 3 x/week to address the above listed problems via self-care/home management, therapeutic activities, therapeutic exercises, neuromuscular re-education, cognitive retraining  Plan of Care Expires: 06/13/24  Plan of Care Reviewed with: patient, daughter    Subjective     Chief Complaint: No verbalizations  Patient/Family Comments/goals: To eventually get pt. Back home  Pain/Comfort:  Pain Rating 1: 0/10  Pain Rating Post-Intervention 1: 0/10    Objective:     Communicated with: nurse prior to session.  Patient found supine with PEG Tube upon OT entry to room. Daughter present    General Precautions: Standard,  aphasia, fall, NPO, aspiration    Orthopedic Precautions:N/A  Braces: N/A  Respiratory Status: Room air     Occupational Performance:     Bed Mobility:    Patient completed Scooting/Bridging with total assistance and 2 persons  Patient completed Supine to Sit with total assistance and 2 persons  Patient completed Sit to Supine with total assistance and 2 persons     Functional Mobility/Transfers:  Patient completed Sit <> Stand Transfer with total assistance and of 2 persons  with  no assistive device  and cleared buttocks on 1/3 trials; attempted RW . Pt. With hands placed on walker but unable to lift any portion of buttocks off bed with assist of 2  Functional Mobility: not tested    Activities of Daily Living:  Grooming: stand by assistance seated EOB to wash face and apply lip balm  Upper Body Dressing: minimum assistance to don gown forward when supine in bed      [facility] 6 Click ADL: 12    Treatment & Education:  Pt. Engaged in activity of volleying balloon x multiple trials seated EOB with CGA   Pt. Kicked balloon with BLE x 5 reps on R LE and 3 to 4 on LLE with CGA  Pt. Wavesarah roae following demonstration at end of session    Patient left supine with all lines intact and call button in reach    GOALS:   Multidisciplinary Problems       Occupational Therapy Goals          Problem: Occupational Therapy    Goal Priority Disciplines Outcome Interventions   Occupational Therapy Goal     OT, PT/OT Progressing    Description: Goals to be met by: 5/22/24 Goals revised as needed on 05-30 to be met by 06-19:    Patient will increase functional independence with ADLs by performing:    UE Dressing with Maximum Assistance.MET 05-30  Revised: UBD with Min A  Grooming while EOB with Maximum Assistance.Met 05-30  Revised: Grooming seated EOB with CGA  Sitting at edge of bed x5 minutes with Maximum Assistance. - Met 5/22  Revised: Sit EOB x 20 minutes with SBA  Rolling to Bilateral with Moderate Assistance.NOT MET     Supine  to sit with Maximum Assistance. NOT MET                           Time Tracking:     OT Date of Treatment: 06/06/24  OT Start Time: 1321  OT Stop Time: 1359  OT Total Time (min): 38 min    Billable Minutes:Self Care/Home Management 23  Therapeutic Activity 15    OT/JOSÉ: OT     Number of JOSÉ visits since last OT visit: 0    6/6/2024

## 2024-06-06 NOTE — NURSING
Nurses Note -- 4 Eyes      6/5/24 1915      Skin assessed during: Q Shift Change      [x] No Altered Skin Integrity Present    []Prevention Measures Documented      [] Yes- Altered Skin Integrity Present or Discovered   [] LDA Added if Not in Epic (Describe Wound)   [] New Altered Skin Integrity was Present on Admit and Documented in LDA   [] Wound Image Taken    Wound Care Consulted? No    Attending Nurse:  ELISABETH Hampton    Second RN/Staff Member:  GONZALEZ Noriega

## 2024-06-07 LAB
ALBUMIN SERPL BCP-MCNC: 3.3 G/DL (ref 3.5–5.2)
ANION GAP SERPL CALC-SCNC: 14 MMOL/L (ref 8–16)
BUN SERPL-MCNC: 45 MG/DL (ref 6–20)
CALCIUM SERPL-MCNC: 10.6 MG/DL (ref 8.7–10.5)
CHLORIDE SERPL-SCNC: 89 MMOL/L (ref 95–110)
CO2 SERPL-SCNC: 25 MMOL/L (ref 23–29)
CREAT SERPL-MCNC: 6.6 MG/DL (ref 0.5–1.4)
EST. GFR  (NO RACE VARIABLE): 7 ML/MIN/1.73 M^2
GLUCOSE SERPL-MCNC: 154 MG/DL (ref 70–110)
PHOSPHATE SERPL-MCNC: 5.4 MG/DL (ref 2.7–4.5)
POCT GLUCOSE: 135 MG/DL (ref 70–110)
POCT GLUCOSE: 186 MG/DL (ref 70–110)
POCT GLUCOSE: 205 MG/DL (ref 70–110)
POCT GLUCOSE: 216 MG/DL (ref 70–110)
POTASSIUM SERPL-SCNC: 4.4 MMOL/L (ref 3.5–5.1)
SODIUM SERPL-SCNC: 128 MMOL/L (ref 136–145)

## 2024-06-07 PROCEDURE — 94761 N-INVAS EAR/PLS OXIMETRY MLT: CPT

## 2024-06-07 PROCEDURE — 25000003 PHARM REV CODE 250

## 2024-06-07 PROCEDURE — 80100014 HC HEMODIALYSIS 1:1

## 2024-06-07 PROCEDURE — 97530 THERAPEUTIC ACTIVITIES: CPT | Mod: CQ

## 2024-06-07 PROCEDURE — 20600001 HC STEP DOWN PRIVATE ROOM

## 2024-06-07 PROCEDURE — 27000207 HC ISOLATION

## 2024-06-07 PROCEDURE — 63600175 PHARM REV CODE 636 W HCPCS: Performed by: NURSE PRACTITIONER

## 2024-06-07 PROCEDURE — 90935 HEMODIALYSIS ONE EVALUATION: CPT | Mod: ,,, | Performed by: NURSE PRACTITIONER

## 2024-06-07 PROCEDURE — 80069 RENAL FUNCTION PANEL: CPT

## 2024-06-07 PROCEDURE — 36415 COLL VENOUS BLD VENIPUNCTURE: CPT

## 2024-06-07 PROCEDURE — 63600175 PHARM REV CODE 636 W HCPCS

## 2024-06-07 PROCEDURE — 25000003 PHARM REV CODE 250: Performed by: NURSE PRACTITIONER

## 2024-06-07 RX ADMIN — PANTOPRAZOLE SODIUM 40 MG: 40 GRANULE, DELAYED RELEASE ORAL at 12:06

## 2024-06-07 RX ADMIN — METOPROLOL TARTRATE 25 MG: 25 TABLET, FILM COATED ORAL at 12:06

## 2024-06-07 RX ADMIN — HEPARIN SODIUM 5000 UNITS: 5000 INJECTION INTRAVENOUS; SUBCUTANEOUS at 09:06

## 2024-06-07 RX ADMIN — METOPROLOL TARTRATE 25 MG: 25 TABLET, FILM COATED ORAL at 09:06

## 2024-06-07 RX ADMIN — HEPARIN SODIUM 5000 UNITS: 5000 INJECTION INTRAVENOUS; SUBCUTANEOUS at 06:06

## 2024-06-07 RX ADMIN — INSULIN ASPART 1 UNITS: 100 INJECTION, SOLUTION INTRAVENOUS; SUBCUTANEOUS at 09:06

## 2024-06-07 RX ADMIN — HEPARIN SODIUM 5000 UNITS: 5000 INJECTION INTRAVENOUS; SUBCUTANEOUS at 03:06

## 2024-06-07 RX ADMIN — ERYTHROPOIETIN 6100 UNITS: 10000 INJECTION, SOLUTION INTRAVENOUS; SUBCUTANEOUS at 10:06

## 2024-06-07 RX ADMIN — INSULIN ASPART 2 UNITS: 100 INJECTION, SOLUTION INTRAVENOUS; SUBCUTANEOUS at 05:06

## 2024-06-07 RX ADMIN — Medication 500 MG: at 12:06

## 2024-06-07 RX ADMIN — ASPIRIN 81 MG CHEWABLE TABLET 81 MG: 81 TABLET CHEWABLE at 12:06

## 2024-06-07 RX ADMIN — INSULIN GLARGINE 10 UNITS: 100 INJECTION, SOLUTION SUBCUTANEOUS at 09:06

## 2024-06-07 RX ADMIN — SODIUM CHLORIDE 100 ML: 9 INJECTION, SOLUTION INTRAVENOUS at 10:06

## 2024-06-07 RX ADMIN — ATORVASTATIN CALCIUM 40 MG: 40 TABLET, FILM COATED ORAL at 12:06

## 2024-06-07 RX ADMIN — Medication 500 MG: at 09:06

## 2024-06-07 NOTE — ASSESSMENT & PLAN NOTE
Patient has hyponatremia which is uncontrolled,We will aim to correct the sodium by 4-6mEq in 24 hours. We will monitor sodium Daily. The hyponatremia is due to ESRD. Discussed with nephrology, dialysate bath adjusted to account for and correct hyponatremia.  Recent Labs   Lab 06/07/24  0423   *     IMPROVING  - Daily morning CMP  - Continue to montior

## 2024-06-07 NOTE — ASSESSMENT & PLAN NOTE
Adjusting insulin to account for diabetic TF    Patient's FSGs are controlled on current medication regimen.  Last A1c reviewed-   Lab Results   Component Value Date    HGBA1C 6.2 (H) 05/27/2024     Most recent fingerstick glucose reviewed-   Recent Labs   Lab 06/06/24  1625 06/06/24  2137 06/07/24  0946 06/07/24  1157   POCTGLUCOSE 188* 186* 135* 186*       Current correctional scale  Medium  Maintain anti-hyperglycemic dose as follows-   Antihyperglycemics (From admission, onward)    Start     Stop Route Frequency Ordered    05/27/24 1633  insulin aspart U-100 pen 0-5 Units         -- SubQ Before meals & nightly PRN 05/27/24 1533    05/25/24 2100  insulin glargine U-100 (Lantus) pen 10 Units         -- SubQ Nightly 05/25/24 1035        Hold Oral hypoglycemics while patient is in the hospital.  POCT glucose q6h

## 2024-06-07 NOTE — PLAN OF CARE
Problem: Physical Therapy  Goal: Physical Therapy Goal  Description: Goals to be met by: 24   Goals remain appropriate 5/3/2024 to be met by 24  Goals updated 2024 to be met by 24  Goals updated 24 to be met by 24  Goals remain appropriate 24 to be met by 24    Patient will increase functional independence with mobility by performin. Supine to sit with Maximum Assistance  2. Sit to supine with Maximum Assistance  3. Rolling to Left and Right with Moderate Assistance.  4. Sitting at edge of bed 5 minutes with Moderate Assistance- MET , monitor consistency  4a. Sitting at edge of bed 10 minutes with Supervision  5. Lower extremity exercise program x20 reps per handout, with assistance as needed  6. Sit to stand transfer with Maximum Assistance with or without LRAD  7. Stand for 2 minutes with Maximum Assistance with or without LRAD    Outcome: Progressing    Goals extended through 24

## 2024-06-07 NOTE — PLAN OF CARE
Problem: Infection  Goal: Absence of Infection Signs and Symptoms  Outcome: Progressing     Problem: Skin Injury Risk Increased  Goal: Skin Health and Integrity  Outcome: Progressing     Problem: Adult Inpatient Plan of Care  Goal: Plan of Care Review  Outcome: Progressing     Pt in bed no acute distress noted at this time. Call light in reach and Safety measures in progress.

## 2024-06-07 NOTE — PROGRESS NOTES
HD completed, blood returned and catheter ports flushed with norm al saline. Ports capped and secured. Net uf 2 liters. Post B/P 134/74, pulse 108, o2 sat on room air 99%. Patient alert

## 2024-06-07 NOTE — ASSESSMENT & PLAN NOTE
On PEG tube. Re-consulting wound care for PEG tube assistance to continue care at nursing home.     - re consulted wound care

## 2024-06-07 NOTE — PROGRESS NOTES
[facility] HEMODIALYSIS NOTE     Patient currently on hemodialysis for removal of uremic toxins and volume.     Patient seen and evaluated on hemodialysis, tolerating treatment, see HD flowsheet for vitals and assessments.    Labs have been reviewed and the dialysate bath has been adjusted.       Assessment/Plan:    -Pending placement   -Patient seen on HD, tolerating treatment well, w/o complaints   -UF goal of 2L  -Renal diet, if not NPO   -Strict I/O's and daily weights  -Daily renal function panels  -Keep MAP >65 while on HD   -Hgb goal 10-11, continue epo   -Will continue to follow while inpatient     Delfina Shi DNP-FNP, C  Nephrology  Pager: [phone]

## 2024-06-07 NOTE — PLAN OF CARE
[facility] - Telemetry Stepdown  Discharge Reassessment    Primary Care Provider: No, Primary Doctor    Expected Discharge Date: 6/10/2024    Reassessment (most recent)       Discharge Reassessment - 06/07/24 1423          Discharge Reassessment    Assessment Type Discharge Planning Reassessment     Did the patient's condition or plan change since previous assessment? No     Discharge Plan discussed with: Adult children     Communicated ZEKE with patient/caregiver Yes     Discharge Plan A New Nursing Home placement - group home care facility     Discharge Plan B New Nursing Home placement - group home care facility     DME Needed Upon Discharge  other (see comments)   TBD    Why the patient remains in the hospital Placement issues        Post-Acute Status    Post-Acute Authorization Placement     Post-Acute Placement Status Referrals Sent                     Discharge Plan A and Plan B have been determined by review of patient's clinical status, future medical and therapeutic needs, and coverage/benefits for post-acute care in coordination with multidisciplinary team members.     Sara Loredo RN  Ext [phone]

## 2024-06-07 NOTE — PLAN OF CARE
Call placed to [facility] ([phone]) to check on the status of patient's admission. They informed this CM that they have reached out to patient's daughter Aleks to complete admission paperwork. Will continue to follow.    11:15AM   Call received from Lida at [facility] who informed this CM that he has spoken with Aleks and they have sent her admission paperwork as well as an application for long term Medicaid.    Sara Loredo RN  Ext [phone]

## 2024-06-07 NOTE — PLAN OF CARE
Problem: Adult Inpatient Plan of Care  Goal: Plan of Care Review  Outcome: Progressing  Flowsheets (Taken 6/7/2024 1751)  Plan of Care Reviewed With:   patient   family  Problem: Skin Injury Risk Increased  Goal: Skin Health and Integrity  Outcome: Progressing  Intervention: Optimize Skin Protection  Flowsheets (Taken 6/7/2024 1751)  Pressure Reduction Techniques:   frequent weight shift encouraged   weight shift assistance provided  Pressure Reduction Devices:   positioning supports utilized   heel offloading device utilized   foam padding utilized   pressure-redistributing mattress utilized   alternating pressure pump (MARCOS)  Skin Protection:   protective footwear used   skin sealant/moisture barrier applied  Activity Management: Rolling - L1  Head of Bed (HOB) Positioning: HOB at 30 degrees     POC reviewed. HD removed 2L. Tolerating  tube feeding residual <15cc.  No acute changes. Awaiting placement to [facility]. Wound care performed to Right groin. No s/s of discomfort/distress. Bed in lowest position.

## 2024-06-07 NOTE — NURSING
Nurses Note -- 4 Eyes      6/6/2024   7:24 PM      Skin assessed during: Q Shift Change      [] No Altered Skin Integrity Present    []Prevention Measures Documented      [x] Yes- Altered Skin Integrity Present or Discovered   [] LDA Added if Not in Epic (Describe Wound)   [] New Altered Skin Integrity was Present on Admit and Documented in LDA   [] Wound Image Taken    Wound Care Consulted? No    Attending Nurse:  Nan Eastman RN/Staff Member:  Chelsi

## 2024-06-07 NOTE — ASSESSMENT & PLAN NOTE
Creatine stable for now. BMP reviewed- noted Estimated Creatinine Clearance: 12.3 mL/min (A) (based on SCr of 6.6 mg/dL (H)). according to latest data. Based on current GFR, CKD stage is end stage.  Monitor UOP and serial BMP and adjust therapy as needed. Renally dose meds. Avoid nephrotoxic medications and procedures.    SW onboard for placement to [facility] to continue dialysis. Once patient gets accepted at [facility], next step will be to work with daughter on senior living NH placement.     -- HD MWF (~1L fluid removal on average per session)  -- nephro following

## 2024-06-07 NOTE — PT/OT/SLP PROGRESS
Physical Therapy Treatment    Patient Name:  Sarah Saravia   MRN:  [id number]    Recommendations:     Discharge Recommendations: Moderate Intensity Therapy  Discharge Equipment Recommendations: hospital bed, lift device, wheelchair  Barriers to discharge: Pt requiring increased skilled assistance at current time.     Assessment:     Sarah Saravia is a 53 y.o. female admitted with a medical diagnosis of Acute cystitis with hematuria.  She presents with the following impairments/functional limitations: weakness, impaired endurance, impaired self care skills, impaired functional mobility, impaired cognition, decreased coordination, decreased upper extremity function, decreased lower extremity function, decreased safety awareness, decreased ROM, impaired skin requiring total assistance of 2 and verbal cues for bed mob, scooting to EOB/HOB due to weakness, body habitus, fear of falling.   In light of pt's current functional level and deficits, it is anticipated that pt will need to participate in a moderate intensity rehab program consisting of PT and OT in order to achieve full rehab potential to return to previous level of function and roles.  Pt remains motivated to participate in PT session and will cont to benefit from skilled PT intervention..    Rehab Prognosis: Fair; patient would benefit from acute skilled PT services to address these deficits and reach maximum level of function.    Recent Surgery: * No surgery found *      Plan:     During this hospitalization, patient to be seen 3 x/week to address the identified rehab impairments via gait training, therapeutic activities, therapeutic exercises, neuromuscular re-education and progress toward the following goals:    Plan of Care Expires:  06/22/24    Subjective     Chief Complaint: weakness, fear of falling  Pain/Comfort:  Pain Rating 1: 0/10  Pain Rating Post-Intervention 1: 0/10      Objective:     Communicated with nurse (paras) prior to session.   Patient found HOB elevated with PEG Tube (daughter present) upon PT entry to room.     General Precautions: Standard, aphasia, aspiration, fall, NPO  Orthopedic Precautions: N/A  Braces: N/A  Respiratory Status: Room air     Functional Mobility:  Bed Mobility:     Rolling Left:  total assistance and of 2 persons  Rolling Right: total assistance and of 2 persons  Scooting: to EOB with max A with drawsheet; to the HOB dependent of 2 via drawsheet  Supine to Sit: maximal assistance and of 2 persons, HOB at 15* angle  Sit to Supine: total assistance and of 2 persons, HOB flat  Transfers:     Sit to Stand:   attempted from EOB with total A of 2  with no AD  Balance: static sitting at the EOB with SBA/close sup; dynamic sitting with SBA/occasional CGA       AM-PAC 6 CLICK MOBILITY  Turning over in bed (including adjusting bedclothes, sheets and blankets)?: 2  Sitting down on and standing up from a chair with arms (e.g., wheelchair, bedside commode, etc.): 1  Moving from lying on back to sitting on the side of the bed?: 2  Moving to and from a bed to a chair (including a wheelchair)?: 1  Need to walk in hospital room?: 1  Climbing 3-5 steps with a railing?: 1  Basic Mobility Total Score: 8       Treatment & Education:  Patient provided with daily orientation and goals of this PT session. They were educated to call for assistance and to transfer with hospital staff only.  Also, pt was educated on the effects of prolonged immobility and the importance of performing OOB activity and exercises to promote healing and reduce recovery time    Patient left HOB elevated with all lines intact, call button in reach, nurse notified, and daughter present..    GOALS:   Multidisciplinary Problems       Physical Therapy Goals          Problem: Physical Therapy    Goal Priority Disciplines Outcome Goal Variances Interventions   Physical Therapy Goal     PT, PT/OT Progressing     Description: Goals to be met by: 05/07/24   Goals remain  appropriate 5/3/2024 to be met by 24  Goals updated 2024 to be met by 24  Goals updated 24 to be met by 24  Goals remain appropriate 24 to be met by 24    Patient will increase functional independence with mobility by performin. Supine to sit with Maximum Assistance  2. Sit to supine with Maximum Assistance  3. Rolling to Left and Right with Moderate Assistance.  4. Sitting at edge of bed 5 minutes with Moderate Assistance- MET , monitor consistency  4a. Sitting at edge of bed 10 minutes with Supervision  5. Lower extremity exercise program x20 reps per handout, with assistance as needed  6. Sit to stand transfer with Maximum Assistance with or without LRAD  7. Stand for 2 minutes with Maximum Assistance with or without LRAD                         Time Tracking:     PT Received On: 24  PT Start Time: 1512     PT Stop Time: 1545  PT Total Time (min): 33 min     Billable Minutes: Therapeutic Activity 33    Treatment Type: Treatment  PT/PTA: PTA     Number of PTA visits since last PT visit: 3     2024

## 2024-06-07 NOTE — PROGRESS NOTES
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 History was given from patient's daughter. She was undergoing dialysis today and noticed she was lethargic, less alert than usual self. Pt completed dialysis and still not acting herself. EMS was called, fever of a 100.  On chart review, she did have an episode of large volume emesis around 1700.  Per EMS, she had a slightly elevated temp 100.0°F, glucose 300s.     In the ED: UA 2+ leuks, >100 WBC, many bacteria, WBC 17, CT abd/pelvis concerning for cystitis. Given vanc/zosyn    Overview/Hospital Course:  Patient was admitted to Hospital Medicine service for medical management and evaluation of urosepsis. Patient was continued on vanc/zosyn. Vascular Neurology consulted concerning mental status change with the recommendation to initiate ASA 81 QD and to obtain an MRI to r/o new stroke. Imaging pending. Nephrology was consulted for regularly scheduled dialysis. Afternoon of 4/12, patient was unable to tolerate filtration of volume during dialsis 2/2 hypotension. Patient received 500cc bolus and was transferred back to floor after finishing the session without volume removed. Will monitor for signs of volume overload. Tailoring insulin regimen while uptitrating tube feeds to goal rate. Staph Epi in all 4 bottles; ID with recommendation to continue Vanc & de-escalate meropenem to ertapenem on 04/15 through 4/16. Patient completed IV course of UTI coverage. Patient tolerated volume removal well in dialysis  throughout the rest of her hospital stay. Pending discharge to LTACH pending bed availability. Patient without BM with one episode of vomiting overnight 4/17. KUB with bowel gas and low concern for obstruction with no transition point noted. Escalating bowel regimen. Pending placement as of 4/22. Pulm consult placed to evaluate for possible trach downsize & eventual decannulation to assist placement if safe. Trach capping trial per pulm for 48h and will assess for decannulation on Monday. DVT studies negative. Pulm successfully decannulated pt 4/30, IR exchanged TDC. Pending swallow study with SLP and waiting for dispo options. Pt failed swallow study, placement pending. Working with PT on mobilize pt to sitting in a chair to expand placement options. Pt successfully mobilized using sandee lift but required 2 people to do so. Discussing dispo plan with family, likely d/c home if HD, DME needs able to be met. Pending acceptance at outpatient HD center. Ongoing placement difficulties d/t need for accepting HD facility to have sandee lift with 2 personnel to operate. Family meeting to discuss disposition options planned for Thursday 5/23 at 1 PM. SW onboard for placement to [facility] to continue dialysis. Once patient gets accepted at [facility], next step will be to work with daughter on [facility] NH placement. Hyperkalamic, given lokelma x1. SW onboard working on paperwork for long term Medicaid application. Pending NH placement.     Interval History: pending NH placement. SW onboard. Continues on dialysis as IP.     Review of Systems   Unable to perform ROS: Patient nonverbal     Objective:     Vital Signs (Most Recent):  Temp: 97.5 °F (36.4 °C) (06/07/24 1158)  Pulse: (!) 118 (06/07/24 1158)  Resp: 18 (06/07/24 1158)  BP: 115/77 (06/07/24 1158)  SpO2: 100 % (06/07/24 1158) Vital Signs (24h Range):  Temp:  [97.5 °F (36.4 °C)-98.5 °F (36.9 °C)] 97.5 °F (36.4 °C)  Pulse:  [] 118  Resp:  [16-18] 18  SpO2:   [97 %-100 %] 100 %  BP: (114-155)/(54-92) 115/77     Weight: 105.5 kg (232 lb 9.4 oz)  Body mass index is 36.43 kg/m².    Intake/Output Summary (Last 24 hours) at 6/7/2024 1309  Last data filed at 6/7/2024 1130  Gross per 24 hour   Intake 1080 ml   Output 2800 ml   Net -1720 ml         Physical Exam  Vitals and nursing note reviewed.   Constitutional:       General: She is not in acute distress.     Appearance: She is not ill-appearing, toxic-appearing or diaphoretic.   HENT:      Head: Normocephalic and atraumatic.   Eyes:      General: No scleral icterus.        Right eye: No discharge.         Left eye: No discharge.      Conjunctiva/sclera: Conjunctivae normal.   Neck:      Comments: Decannulated  Cardiovascular:      Rate and Rhythm: Normal rate and regular rhythm.      Heart sounds: Normal heart sounds.   Pulmonary:      Effort: Pulmonary effort is normal. No respiratory distress.   Abdominal:      General: Abdomen is flat. There is no distension.      Tenderness: There is no abdominal tenderness.      Comments: PEG without signs of inflammation/bleeding     Musculoskeletal:      Right lower leg: No edema.      Left lower leg: No edema.   Skin:     General: Skin is warm and dry.   Neurological:      General: No focal deficit present.      Mental Status: She is alert.      Comments: Nonverbal             Significant Labs: All pertinent labs within the past 24 hours have been reviewed.    Significant Imaging: I have reviewed all pertinent imaging results/findings within the past 24 hours.    Assessment/Plan:      * Acute cystitis with hematuria  Resolved     Pt presenting with AMS and worsening lethargy. Pt is non-verbal at baseline, does not follow commands, but was less responsive than normal. Pt found to have a fever. Urinary source suspected based off of urine and CT abd/pelvis. Pt has many prior resistant bacterial infections including urinary sources. Has prior with sensitivity to zosyn and has also  improved off ED dose of vanc/zosyn. Lactic elevated a 3.8->3.49 prior to completion of fluid bolus 500ml.    Patient with new fever and tachycardia on 4/25. Low threshold to initiate additional infectious workup and repeat UA.     Plan  S/p course of vanc/ertapenem per ID. Course completed 4/16.  Daily cbc  Contact precautions    *on contact precautions indefinitely while patient still makes urine given pt incontinent per infection control    Vulvovaginal candidiasis  Noted 5/29, given 150mg fluconazole x1      Irritant contact dermatitis due friction or contact with other specified body fluids  Wound care following       Debility  Needs:  Wheelchair: patient has a mobility limitation that significantly impairs her ability to participate in one or more mobility related activities of daily living (MRADL's) such as toileting, feeding, dressing, grooming, and bathing in customary locations in the home.  The mobility limitation cannot be sufficiently resolved by the use of a cane or walker.   The use of a manual wheelchair will significantly improve the patient's ability to participate in MRADLS and the patient will use it on regular basis in the home.  Family/pt has expressed her willingness to use a manual wheelchair in the home.  She also has a caregiver who is capable of assisting in mobility.    Mrs Saravia requires a hospital bed due to her requiring positioning of the body in ways not feasible with an ordinary bed to alleviate pain/ is completely immobile /or limited mobility and cannot independently make changes in body position without the use of the bed.  The positioning of the body cannot be sufficiently resolved by the use of pillows and wedges    Patient has a mobility limitation that significantly impairs their ability to participate in one or more mobility related activities of daily living, including toileting. This deficit can be resolved by using a bedside commode. Patient demonstrates mobility  limitations that will cause them to be confined to one room at home without bathroom access for up to 30 days. Using a bedside commode will greatly improve the patient's ability to participate in MRADLs       Complication of vascular dialysis catheter  Cath intermittently clogging, not resolving with cath-hedy, going for IR venogram 4/30 with possible exchange. S/p exchange with IR on 4/30      Constipation  Resolved     Moderate malnutrition  Nutrition consulted. Most recent weight and BMI monitored-     Measurements:  Wt Readings from Last 1 Encounters:   06/04/24 105.5 kg (232 lb 9.4 oz)   Body mass index is 36.43 kg/m².    Patient has been screened and assessed by RD.    Malnutrition Type:  Context: acute illness or injury  Level: moderate    Malnutrition Characteristic Summary:  Weight Loss (Malnutrition): 10% in 6 months  Subcutaneous Fat (Malnutrition): mild depletion  Muscle Mass (Malnutrition): mild depletion  Fluid Accumulation (Malnutrition): mild    Interventions/Recommendations (treatment strategy):  1.    - on tube feeds   - previous history of failed swallow studies    Prolonged QT interval  Qtc 526 [04/10/24]      limit Qtc prolonging drugs as able    Spastic hemiplegia of right dominant side as late effect of cerebrovascular disease  Important that patient is able to sit in chair for 4h for dialysis placement needs, even if she requires lift/assistance getting into the chair     This patient has Chronic right hemiplegia due to stroke. Physical therapy services has been scheduled. Continue all standard measures for pressure injury prevention and consult wound care for any wounds (chronic or acute).    ESRD (end stage renal disease) on dialysis  Creatine stable for now. BMP reviewed- noted Estimated Creatinine Clearance: 12.3 mL/min (A) (based on SCr of 6.6 mg/dL (H)). according to latest data. Based on current GFR, CKD stage is end stage.  Monitor UOP and serial BMP and adjust therapy as needed.  Renally dose meds. Avoid nephrotoxic medications and procedures.    SW onboard for placement to [facility] to continue dialysis. Once patient gets accepted at [facility], next step will [facility].     -- HD MWF (~1L fluid removal on average per session)  -- nephro following    PEG (percutaneous endoscopic gastrostomy) status  On PEG tube. Re-consulting wound care for PEG tube assistance to continue care at nursing home.     - re consulted wound care        Status post tracheostomy  Resolved, decannulated 4/30    Acute encephalopathy  Pt is non-verbal and does not follow commands at baseline. Vascular Neurology consulted given acute change from baseline. MRI with concern for evolution of prior strokes with noted differential of T2 hyperintensities including vasculitis vs demyelination. Discussed with Vascular Neurology; low concern for ongoing vasculitis/demyelination, likely represents typical evolution of prior infarcts.    Patient at baseline as of 4/22.     Resolved    Type 2 diabetes mellitus with hyperglycemia, with long-term current use of insulin  Adjusting insulin to account for diabetic TF    Patient's FSGs are controlled on current medication regimen.  Last A1c reviewed-   Lab Results   Component Value Date    HGBA1C 6.2 (H) 05/27/2024     Most recent fingerstick glucose reviewed-   Recent Labs   Lab 06/06/24  1625 06/06/24  2137 06/07/24  0946 06/07/24  1157   POCTGLUCOSE 188* 186* 135* 186*       Current correctional scale  Medium  Maintain anti-hyperglycemic dose as follows-   Antihyperglycemics (From admission, onward)      Start     Stop Route Frequency Ordered    05/27/24 1633  insulin aspart U-100 pen 0-5 Units         -- SubQ Before meals & nightly PRN 05/27/24 1533    05/25/24 2100  insulin glargine U-100 (Lantus) pen 10 Units         -- SubQ Nightly 05/25/24 1035          Hold Oral hypoglycemics while patient is in the hospital.  POCT glucose  q6h    Hyponatremia  Patient has hyponatremia which is uncontrolled,We will aim to correct the sodium by 4-6mEq in 24 hours. We will monitor sodium Daily. The hyponatremia is due to ESRD. Discussed with nephrology, dialysate bath adjusted to account for and correct hyponatremia.  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 Pt underwent extensive course, currently still healing from this, but no longer has wound vac. Pt's wound currently covered with bandage. Picture in media tabs    Plan  Wound care        VTE Risk Mitigation (From admission, onward)           Ordered     heparin (porcine) injection 5,000 Units  Every 8 hours         05/29/24 0823     heparin (porcine) injection 1,000 Units  As needed (PRN)         05/21/24 1017     heparin (porcine) injection 3,000 Units  As needed (PRN)         04/17/24 0825                    Discharge Planning   ZEKE: 6/10/2024     Code Status: Full Code   Is the patient medically ready for discharge?: No    Reason for patient still in hospital (select all that apply): Patient trending condition  Discharge Plan A: New Nursing Home placement - shelter care facility            Steven Miranda MD  Department of Hospital Medicine   [facility] - Telemetry Stepdown

## 2024-06-07 NOTE — NURSING
Nurses Note -- 4 Eyes      6/7/2024   720am      Skin assessed during: Q Shift Change      [] No Altered Skin Integrity Present    []Prevention Measures Documented      [x] Yes- Altered Skin Integrity Present or Discovered   [] LDA Added if Not in Epic (Describe Wound)   [] New Altered Skin Integrity was Present on Admit and Documented in LDA   [] Wound Image Taken    Wound Care Consulted? No    Attending Nurse:  Chelsi Eastman RN/Staff Member: Nan

## 2024-06-07 NOTE — SUBJECTIVE & OBJECTIVE
Interval History: pending NH placement. SW onboard. Continues on dialysis as IP.     Review of Systems   Unable to perform ROS: Patient nonverbal     Objective:     Vital Signs (Most Recent):  Temp: 97.5 °F (36.4 °C) (06/07/24 1158)  Pulse: (!) 118 (06/07/24 1158)  Resp: 18 (06/07/24 1158)  BP: 115/77 (06/07/24 1158)  SpO2: 100 % (06/07/24 1158) Vital Signs (24h Range):  Temp:  [97.5 °F (36.4 °C)-98.5 °F (36.9 °C)] 97.5 °F (36.4 °C)  Pulse:  [] 118  Resp:  [16-18] 18  SpO2:  [97 %-100 %] 100 %  BP: (114-155)/(54-92) 115/77     Weight: 105.5 kg (232 lb 9.4 oz)  Body mass index is 36.43 kg/m².    Intake/Output Summary (Last 24 hours) at 6/7/2024 1309  Last data filed at 6/7/2024 1130  Gross per 24 hour   Intake 1080 ml   Output 2800 ml   Net -1720 ml         Physical Exam  Vitals and nursing note reviewed.   Constitutional:       General: She is not in acute distress.     Appearance: She is not ill-appearing, toxic-appearing or diaphoretic.   HENT:      Head: Normocephalic and atraumatic.   Eyes:      General: No scleral icterus.        Right eye: No discharge.         Left eye: No discharge.      Conjunctiva/sclera: Conjunctivae normal.   Neck:      Comments: Decannulated  Cardiovascular:      Rate and Rhythm: Normal rate and regular rhythm.      Heart sounds: Normal heart sounds.   Pulmonary:      Effort: Pulmonary effort is normal. No respiratory distress.   Abdominal:      General: Abdomen is flat. There is no distension.      Tenderness: There is no abdominal tenderness.      Comments: PEG without signs of inflammation/bleeding     Musculoskeletal:      Right lower leg: No edema.      Left lower leg: No edema.   Skin:     General: Skin is warm and dry.   Neurological:      General: No focal deficit present.      Mental Status: She is alert.      Comments: Nonverbal             Significant Labs: All pertinent labs within the past 24 hours have been reviewed.    Significant Imaging: I have reviewed all  pertinent imaging results/findings within the past 24 hours.

## 2024-06-08 LAB
ALBUMIN SERPL BCP-MCNC: 3.5 G/DL (ref 3.5–5.2)
ALP SERPL-CCNC: 105 U/L (ref 55–135)
ALT SERPL W/O P-5'-P-CCNC: 36 U/L (ref 10–44)
ANION GAP SERPL CALC-SCNC: 13 MMOL/L (ref 8–16)
AST SERPL-CCNC: 22 U/L (ref 10–40)
BASOPHILS # BLD AUTO: 0.06 K/UL (ref 0–0.2)
BASOPHILS NFR BLD: 0.6 % (ref 0–1.9)
BILIRUB SERPL-MCNC: 0.3 MG/DL (ref 0.1–1)
BUN SERPL-MCNC: 35 MG/DL (ref 6–20)
CALCIUM SERPL-MCNC: 10.5 MG/DL (ref 8.7–10.5)
CHLORIDE SERPL-SCNC: 94 MMOL/L (ref 95–110)
CO2 SERPL-SCNC: 21 MMOL/L (ref 23–29)
CREAT SERPL-MCNC: 5.6 MG/DL (ref 0.5–1.4)
DIFFERENTIAL METHOD BLD: ABNORMAL
EOSINOPHIL # BLD AUTO: 0.2 K/UL (ref 0–0.5)
EOSINOPHIL NFR BLD: 2 % (ref 0–8)
ERYTHROCYTE [DISTWIDTH] IN BLOOD BY AUTOMATED COUNT: 15.6 % (ref 11.5–14.5)
EST. GFR  (NO RACE VARIABLE): 8.5 ML/MIN/1.73 M^2
GLUCOSE SERPL-MCNC: 222 MG/DL (ref 70–110)
HCT VFR BLD AUTO: 34.8 % (ref 37–48.5)
HGB BLD-MCNC: 10.6 G/DL (ref 12–16)
IMM GRANULOCYTES # BLD AUTO: 0.05 K/UL (ref 0–0.04)
IMM GRANULOCYTES NFR BLD AUTO: 0.5 % (ref 0–0.5)
LYMPHOCYTES # BLD AUTO: 2.2 K/UL (ref 1–4.8)
LYMPHOCYTES NFR BLD: 22.3 % (ref 18–48)
MCH RBC QN AUTO: 25.5 PG (ref 27–31)
MCHC RBC AUTO-ENTMCNC: 30.5 G/DL (ref 32–36)
MCV RBC AUTO: 84 FL (ref 82–98)
MONOCYTES # BLD AUTO: 1.2 K/UL (ref 0.3–1)
MONOCYTES NFR BLD: 12.4 % (ref 4–15)
NEUTROPHILS # BLD AUTO: 6 K/UL (ref 1.8–7.7)
NEUTROPHILS NFR BLD: 62.2 % (ref 38–73)
NRBC BLD-RTO: 0 /100 WBC
PLATELET # BLD AUTO: 288 K/UL (ref 150–450)
PMV BLD AUTO: 10.6 FL (ref 9.2–12.9)
POCT GLUCOSE: 222 MG/DL (ref 70–110)
POCT GLUCOSE: 224 MG/DL (ref 70–110)
POCT GLUCOSE: 228 MG/DL (ref 70–110)
POCT GLUCOSE: 264 MG/DL (ref 70–110)
POTASSIUM SERPL-SCNC: 5.2 MMOL/L (ref 3.5–5.1)
PROT SERPL-MCNC: 9.1 G/DL (ref 6–8.4)
RBC # BLD AUTO: 4.16 M/UL (ref 4–5.4)
SODIUM SERPL-SCNC: 128 MMOL/L (ref 136–145)
WBC # BLD AUTO: 9.65 K/UL (ref 3.9–12.7)

## 2024-06-08 PROCEDURE — 25000003 PHARM REV CODE 250

## 2024-06-08 PROCEDURE — 85025 COMPLETE CBC W/AUTO DIFF WBC: CPT

## 2024-06-08 PROCEDURE — 63600175 PHARM REV CODE 636 W HCPCS

## 2024-06-08 PROCEDURE — 20600001 HC STEP DOWN PRIVATE ROOM

## 2024-06-08 PROCEDURE — 80053 COMPREHEN METABOLIC PANEL: CPT

## 2024-06-08 PROCEDURE — 27000207 HC ISOLATION

## 2024-06-08 PROCEDURE — 36415 COLL VENOUS BLD VENIPUNCTURE: CPT

## 2024-06-08 RX ORDER — SODIUM CHLORIDE 9 MG/ML
INJECTION, SOLUTION INTRAVENOUS ONCE
Status: DISCONTINUED | OUTPATIENT
Start: 2024-06-10 | End: 2024-06-10

## 2024-06-08 RX ADMIN — ATORVASTATIN CALCIUM 40 MG: 40 TABLET, FILM COATED ORAL at 09:06

## 2024-06-08 RX ADMIN — METOPROLOL TARTRATE 25 MG: 25 TABLET, FILM COATED ORAL at 09:06

## 2024-06-08 RX ADMIN — PANTOPRAZOLE SODIUM 40 MG: 40 GRANULE, DELAYED RELEASE ORAL at 09:06

## 2024-06-08 RX ADMIN — Medication 500 MG: at 09:06

## 2024-06-08 RX ADMIN — INSULIN ASPART 2 UNITS: 100 INJECTION, SOLUTION INTRAVENOUS; SUBCUTANEOUS at 04:06

## 2024-06-08 RX ADMIN — Medication 500 MG: at 08:06

## 2024-06-08 RX ADMIN — HEPARIN SODIUM 5000 UNITS: 5000 INJECTION INTRAVENOUS; SUBCUTANEOUS at 01:06

## 2024-06-08 RX ADMIN — METOPROLOL TARTRATE 25 MG: 25 TABLET, FILM COATED ORAL at 08:06

## 2024-06-08 RX ADMIN — HEPARIN SODIUM 5000 UNITS: 5000 INJECTION INTRAVENOUS; SUBCUTANEOUS at 06:06

## 2024-06-08 RX ADMIN — INSULIN GLARGINE 10 UNITS: 100 INJECTION, SOLUTION SUBCUTANEOUS at 08:06

## 2024-06-08 RX ADMIN — INSULIN ASPART 1 UNITS: 100 INJECTION, SOLUTION INTRAVENOUS; SUBCUTANEOUS at 08:06

## 2024-06-08 RX ADMIN — INSULIN ASPART 3 UNITS: 100 INJECTION, SOLUTION INTRAVENOUS; SUBCUTANEOUS at 07:06

## 2024-06-08 RX ADMIN — INSULIN ASPART 2 UNITS: 100 INJECTION, SOLUTION INTRAVENOUS; SUBCUTANEOUS at 11:06

## 2024-06-08 RX ADMIN — ASPIRIN 81 MG CHEWABLE TABLET 81 MG: 81 TABLET CHEWABLE at 09:06

## 2024-06-08 RX ADMIN — HEPARIN SODIUM 5000 UNITS: 5000 INJECTION INTRAVENOUS; SUBCUTANEOUS at 10:06

## 2024-06-08 NOTE — PLAN OF CARE
Problem: Infection  Goal: Absence of Infection Signs and Symptoms  Outcome: Progressing     Problem: Skin Injury Risk Increased  Goal: Skin Health and Integrity  Outcome: Progressing     Problem: Adult Inpatient Plan of Care  Goal: Plan of Care Review  Outcome: Progressing     No acute S/S noted this time. Patient rested well on the bed. Patient care ongoing.

## 2024-06-08 NOTE — ASSESSMENT & PLAN NOTE
Creatine stable for now. BMP reviewed- noted Estimated Creatinine Clearance: 14.5 mL/min (A) (based on SCr of 5.6 mg/dL (H)). according to latest data. Based on current GFR, CKD stage is end stage.  Monitor UOP and serial BMP and adjust therapy as needed. Renally dose meds. Avoid nephrotoxic medications and procedures.    SW onboard for placement to [facility] to continue dialysis. Once patient gets accepted at [facility], next step will be to work with daughter on [facility] NH placement.     -- HD MWF (~1L fluid removal on average per session)  -- nephro following

## 2024-06-08 NOTE — SUBJECTIVE & OBJECTIVE
Interval History: pending NH placement. Continues on HD as IP.     Review of Systems   Unable to perform ROS: Patient nonverbal     Objective:     Vital Signs (Most Recent):  Temp: 98.7 °F (37.1 °C) (06/08/24 1134)  Pulse: 97 (06/08/24 1134)  Resp: 20 (06/08/24 1134)  BP: 117/62 (06/08/24 1134)  SpO2: 99 % (06/08/24 1134) Vital Signs (24h Range):  Temp:  [97.5 °F (36.4 °C)-99.4 °F (37.4 °C)] 98.7 °F (37.1 °C)  Pulse:  [] 97  Resp:  [18-20] 20  SpO2:  [98 %-100 %] 99 %  BP: (115-147)/(62-87) 117/62     Weight: 105.5 kg (232 lb 9.4 oz)  Body mass index is 36.43 kg/m².    Intake/Output Summary (Last 24 hours) at 6/8/2024 1151  Last data filed at 6/8/2024 0750  Gross per 24 hour   Intake 1000 ml   Output 10 ml   Net 990 ml         Physical Exam  Vitals and nursing note reviewed.   Constitutional:       General: She is not in acute distress.     Appearance: She is not ill-appearing, toxic-appearing or diaphoretic.   HENT:      Head: Normocephalic and atraumatic.   Eyes:      General: No scleral icterus.        Right eye: No discharge.         Left eye: No discharge.      Conjunctiva/sclera: Conjunctivae normal.   Neck:      Comments: Decannulated  Cardiovascular:      Rate and Rhythm: Normal rate and regular rhythm.      Heart sounds: Normal heart sounds.   Pulmonary:      Effort: Pulmonary effort is normal. No respiratory distress.   Abdominal:      General: Abdomen is flat. There is no distension.      Tenderness: There is no abdominal tenderness.      Comments: PEG without signs of inflammation/bleeding     Musculoskeletal:      Right lower leg: No edema.      Left lower leg: No edema.   Skin:     General: Skin is warm and dry.   Neurological:      General: No focal deficit present.      Mental Status: She is alert.      Comments: Nonverbal             Significant Labs: All pertinent labs within the past 24 hours have been reviewed.    Significant Imaging: I have reviewed all pertinent imaging results/findings  within the past 24 hours.

## 2024-06-08 NOTE — CONSULTS
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    Hypermagnesemia 04/11/2024    POA, Mg 3.2  Daily chem       Morbid obesity     Necrotizing fasciitis        Social History     Socioeconomic History    Marital status: Single   Tobacco Use    Smoking status: Unknown     Social Determinants of Health     Financial Resource Strain: Patient Unable To Answer (3/14/2024)    Overall Financial Resource Strain (CARDIA)     Difficulty of Paying Living Expenses: Patient unable to answer   Recent Concern: Financial Resource Strain - High Risk (3/9/2024)    Overall Financial Resource Strain (CARDIA)     Difficulty of Paying Living Expenses: Very hard   Food Insecurity: Patient Unable To Answer (3/14/2024)    Hunger Vital Sign     Worried About Running Out of Food in the Last Year: Patient unable to answer     Ran Out of Food in the Last Year: Patient unable to answer   Transportation Needs: Patient Unable To Answer (3/14/2024)    PRAPARE - Transportation     Lack of Transportation (Medical): Patient unable to answer     Lack of Transportation (Non-Medical): Patient unable to answer   Physical Activity: Inactive (3/13/2024)    Exercise Vital Sign     Days of Exercise per Week: 0 days     Minutes of Exercise per Session: 0 min   Stress: Patient Unable To Answer (3/14/2024)    [employer] - Occupational Stress Questionnaire     Feeling of Stress : Patient unable to answer   Housing Stability: Patient Unable To Answer (3/14/2024)    Housing Stability Vital Sign     Unable to Pay for Housing in the Last Year: Patient unable to answer     Number of Places Lived in the Last Year: 1     Unstable Housing in the Last Year: Patient unable to answer   Recent Concern: Housing Stability - High  Risk (3/9/2024)    Housing Stability Vital Sign     Unable to Pay for Housing in the Last Year: Yes     Unstable Housing in the Last Year: No       Precautions:     Allergies as of 04/10/2024    (No Known Allergies)       WO Assessment Details/Treatment     Patient seen for wound care consultation for PEG site, per consult - 'Pt w/ PEG tube going to NH. Appreciate  assistance for PEG dressings before pt D/C'  Reviewed chart for this encounter.   See Flow Sheet for findings.    Pt known to wound care for previous assessment of PEG site - orders in place. Pt found lying in bed, OK for care at this time. Mepilex Lite dressing removed from peg site, mostly dried drainage w/ small amount of moist, creamy tan drainage appreciated. Site cleansed w/ NS and prepped w/ Cavilon for moisture barrier protection. Mepilex Lite dressing cut in split gauze fashion and applied to site under PEG bumper for protection as bumper is tight.     RECOMMENDATIONS:  No changes to orders, bedside nursing to continue wound care as ordered - Mepilex Lite q2 days.    Discussed POC with patient and primary nurse.   See EMR for orders & patient education.    Bedside nursing to continue care & monitoring.  Bedside nursing to maintain pressure injury prevention interventions.       06/08/24 1306   WOCN Assessment   WOCN Total Time (mins) 15   Visit Date 06/08/24   Visit Time 1306   Consult Type New;Follow Up   WOCN Speciality Wound   Intervention assessed;changed;applied;chart review;coordination of care   Teaching on-going        Gastrostomy/Enterostomy 02/22/24 1200 LUQ   Placement Date/Time: 02/22/24 1200   Inserted by: MD  Location: LUQ   Dressing dried drainage   Insertion Site tan drainage   Site Care site cleansed w/ sterile normal saline;skin barrier applied;other (see comments)  (Mepilex-lite)   Dressing Change Due 06/10/24

## 2024-06-08 NOTE — ASSESSMENT & PLAN NOTE
Patient has hyponatremia which is uncontrolled,We will aim to correct the sodium by 4-6mEq in 24 hours. We will monitor sodium Daily. The hyponatremia is due to ESRD. Discussed with nephrology, dialysate bath adjusted to account for and correct hyponatremia.  Recent Labs   Lab 06/08/24  0753   *     IMPROVING  - Daily morning CMP  - Continue to montior

## 2024-06-08 NOTE — ASSESSMENT & PLAN NOTE
Adjusting insulin to account for diabetic TF    Patient's FSGs are controlled on current medication regimen.  Last A1c reviewed-   Lab Results   Component Value Date    HGBA1C 6.2 (H) 05/27/2024     Most recent fingerstick glucose reviewed-   Recent Labs   Lab 06/07/24  1708 06/07/24  2125 06/08/24  0754 06/08/24  1134   POCTGLUCOSE 216* 205* 264* 224*       Current correctional scale  Medium  Maintain anti-hyperglycemic dose as follows-   Antihyperglycemics (From admission, onward)    Start     Stop Route Frequency Ordered    05/27/24 1633  insulin aspart U-100 pen 0-5 Units         -- SubQ Before meals & nightly PRN 05/27/24 1533    05/25/24 2100  insulin glargine U-100 (Lantus) pen 10 Units         -- SubQ Nightly 05/25/24 1035        Hold Oral hypoglycemics while patient is in the hospital.  POCT glucose q6h

## 2024-06-08 NOTE — PROGRESS NOTES
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 History was given from patient's daughter. She was undergoing dialysis today and noticed she was lethargic, less alert than usual self. Pt completed dialysis and still not acting herself. EMS was called, fever of a 100.  On chart review, she did have an episode of large volume emesis around 1700.  Per EMS, she had a slightly elevated temp 100.0°F, glucose 300s.     In the ED: UA 2+ leuks, >100 WBC, many bacteria, WBC 17, CT abd/pelvis concerning for cystitis. Given vanc/zosyn    Overview/Hospital Course:  Patient was admitted to Hospital Medicine service for medical management and evaluation of urosepsis. Patient was continued on vanc/zosyn. Vascular Neurology consulted concerning mental status change with the recommendation to initiate ASA 81 QD and to obtain an MRI to r/o new stroke. Imaging pending. Nephrology was consulted for regularly scheduled dialysis. Afternoon of 4/12, patient was unable to tolerate filtration of volume during dialsis 2/2 hypotension. Patient received 500cc bolus and was transferred back to floor after finishing the session without volume removed. Will monitor for signs of volume overload. Tailoring insulin regimen while uptitrating tube feeds to goal rate. Staph Epi in all 4 bottles; ID with recommendation to continue Vanc & de-escalate meropenem to ertapenem on 04/15 through 4/16. Patient completed IV course of UTI coverage. Patient tolerated volume removal well in dialysis  throughout the rest of her hospital stay. Pending discharge to LTACH pending bed availability. Patient without BM with one episode of vomiting overnight 4/17. KUB with bowel gas and low concern for obstruction with no transition point noted. Escalating bowel regimen. Pending placement as of 4/22. Pulm consult placed to evaluate for possible trach downsize & eventual decannulation to assist placement if safe. Trach capping trial per pulm for 48h and will assess for decannulation on Monday. DVT studies negative. Pulm successfully decannulated pt 4/30, IR exchanged TDC. Pending swallow study with SLP and waiting for dispo options. Pt failed swallow study, placement pending. Working with PT on mobilize pt to sitting in a chair to expand placement options. Pt successfully mobilized using sandee lift but required 2 people to do so. Discussing dispo plan with family, likely d/c home if HD, DME needs able to be met. Pending acceptance at outpatient HD center. Ongoing placement difficulties d/t need for accepting HD facility to have sandee lift with 2 personnel to operate. Family meeting to discuss disposition options planned for Thursday 5/23 at 1 PM. KARI onboard for placement to [facility] to continue dialysis. Once patient gets accepted at [facility], next step will be to [facility] with [facility] on [facility]. Hyperkalamic, given lokelma x1. SW onboard working on paperwork for long term Medicaid application. Pending NH placement.       Interval History: pending NH placement. Continues on HD as IP.     Review of Systems   Unable to perform ROS: Patient nonverbal     Objective:     Vital Signs (Most Recent):  Temp: 98.7 °F (37.1 °C) (06/08/24 1134)  Pulse: 97 (06/08/24 1134)  Resp: 20 (06/08/24 1134)  BP: 117/62 (06/08/24 1134)  SpO2: 99 % (06/08/24 1134) Vital Signs (24h Range):  Temp:  [97.5 °F (36.4 °C)-99.4 °F (37.4 °C)] 98.7 °F (37.1 °C)  Pulse:  [] 97  Resp:  [18-20] 20  SpO2:  [98 %-100 %] 99 %  BP:  (115-147)/(62-87) 117/62     Weight: 105.5 kg (232 lb 9.4 oz)  Body mass index is 36.43 kg/m².    Intake/Output Summary (Last 24 hours) at 6/8/2024 1151  Last data filed at 6/8/2024 0750  Gross per 24 hour   Intake 1000 ml   Output 10 ml   Net 990 ml         Physical Exam  Vitals and nursing note reviewed.   Constitutional:       General: She is not in acute distress.     Appearance: She is not ill-appearing, toxic-appearing or diaphoretic.   HENT:      Head: Normocephalic and atraumatic.   Eyes:      General: No scleral icterus.        Right eye: No discharge.         Left eye: No discharge.      Conjunctiva/sclera: Conjunctivae normal.   Neck:      Comments: Decannulated  Cardiovascular:      Rate and Rhythm: Normal rate and regular rhythm.      Heart sounds: Normal heart sounds.   Pulmonary:      Effort: Pulmonary effort is normal. No respiratory distress.   Abdominal:      General: Abdomen is flat. There is no distension.      Tenderness: There is no abdominal tenderness.      Comments: PEG without signs of inflammation/bleeding     Musculoskeletal:      Right lower leg: No edema.      Left lower leg: No edema.   Skin:     General: Skin is warm and dry.   Neurological:      General: No focal deficit present.      Mental Status: She is alert.      Comments: Nonverbal             Significant Labs: All pertinent labs within the past 24 hours have been reviewed.    Significant Imaging: I have reviewed all pertinent imaging results/findings within the past 24 hours.    Assessment/Plan:      * Acute cystitis with hematuria  Resolved       Vulvovaginal candidiasis  Noted 5/29, given 150mg fluconazole x1      Irritant contact dermatitis due friction or contact with other specified body fluids  Wound care following       Debility  Needs:  Wheelchair: patient has a mobility limitation that significantly impairs her ability to participate in one or more mobility related activities of daily living (MRADL's) such as  toileting, feeding, dressing, grooming, and bathing in customary locations in the home.  The mobility limitation cannot be sufficiently resolved by the use of a cane or walker.   The use of a manual wheelchair will significantly improve the patient's ability to participate in MRADLS and the patient will use it on regular basis in the home.  Family/pt has expressed her willingness to use a manual wheelchair in the home.  She also has a caregiver who is capable of assisting in mobility.    Mrs Saravia requires a hospital bed due to her requiring positioning of the body in ways not feasible with an ordinary bed to alleviate pain/ is completely immobile /or limited mobility and cannot independently make changes in body position without the use of the bed.  The positioning of the body cannot be sufficiently resolved by the use of pillows and wedges    Patient has a mobility limitation that significantly impairs their ability to participate in one or more mobility related activities of daily living, including toileting. This deficit can be resolved by using a bedside commode. Patient demonstrates mobility limitations that will cause them to be confined to one room at home without bathroom access for up to 30 days. Using a bedside commode will greatly improve the patient's ability to participate in MRADLs       Complication of vascular dialysis catheter  Cath intermittently clogging, not resolving with cath-hedy, going for IR venogram 4/30 with possible exchange. S/p exchange with IR on 4/30      Constipation  Resolved     Moderate malnutrition  Nutrition consulted. Most recent weight and BMI monitored-     Measurements:  Wt Readings from Last 1 Encounters:   06/04/24 105.5 kg (232 lb 9.4 oz)   Body mass index is 36.43 kg/m².    Patient has been screened and assessed by RD.    Malnutrition Type:  Context: acute illness or injury  Level: moderate    Malnutrition Characteristic Summary:  Weight Loss (Malnutrition): 10% in 6  months  Subcutaneous Fat (Malnutrition): mild depletion  Muscle Mass (Malnutrition): mild depletion  Fluid Accumulation (Malnutrition): mild    Interventions/Recommendations (treatment strategy):  1.    - on tube feeds   - previous history of failed swallow studies    Prolonged QT interval  Qtc 526 [04/10/24]      limit Qtc prolonging drugs as able    Spastic hemiplegia of right dominant side as late effect of cerebrovascular disease  Important that patient is able to sit in chair for 4h for dialysis placement needs, even if she requires lift/assistance getting into the chair     This patient has Chronic right hemiplegia due to stroke. Physical therapy services has been scheduled. Continue all standard measures for pressure injury prevention and consult wound care for any wounds (chronic or acute).    ESRD (end stage renal disease) on dialysis  Creatine stable for now. BMP reviewed- noted Estimated Creatinine Clearance: 14.5 mL/min (A) (based on SCr of 5.6 mg/dL (H)). according to latest data. Based on current GFR, CKD stage is end stage.  Monitor UOP and serial BMP and adjust therapy as needed. Renally dose meds. Avoid nephrotoxic medications and procedures.    SW onboard for placement to [facility] to continue dialysis. Once patient gets accepted at [facility], next step will be to work with daughter on [facility] NH placement.     -- HD MWF (~1L fluid removal on average per session)  -- nephro following    PEG (percutaneous endoscopic gastrostomy) status  On PEG tube. Re-consulting wound care for PEG tube assistance to continue care at nursing home.     - re consulted wound care        Status post tracheostomy  Resolved, decannulated 4/30    Acute encephalopathy  Resolved.     Type 2 diabetes mellitus with hyperglycemia, with long-term current use of insulin  Adjusting insulin to account for diabetic TF    Patient's FSGs are controlled on current medication regimen.  Last A1c reviewed-   Lab Results    Component Value Date    HGBA1C 6.2 (H) 05/27/2024     Most recent fingerstick glucose reviewed-   Recent Labs   Lab 06/07/24  1708 06/07/24  2125 06/08/24  0754 06/08/24  1134   POCTGLUCOSE 216* 205* 264* 224*       Current correctional scale  Medium  Maintain anti-hyperglycemic dose as follows-   Antihyperglycemics (From admission, onward)      Start     Stop Route Frequency Ordered    05/27/24 1633  insulin aspart U-100 pen 0-5 Units         -- SubQ Before meals & nightly PRN 05/27/24 1533    05/25/24 2100  insulin glargine U-100 (Lantus) pen 10 Units         -- SubQ Nightly 05/25/24 1035          Hold Oral hypoglycemics while patient is in the hospital.  POCT glucose q6h    Hyponatremia  Patient has hyponatremia which is uncontrolled,We will aim to correct the sodium by 4-6mEq in 24 hours. We will monitor sodium Daily. The hyponatremia is due to ESRD. Discussed with nephrology, dialysate bath adjusted to account for and correct hyponatremia.  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 Pt underwent extensive course, currently still healing from this, but no longer has wound vac. Pt's wound currently covered with bandage. Picture in media tabs    Plan  Wound care        VTE Risk Mitigation (From admission, onward)           Ordered     heparin (porcine) injection 5,000 Units  Every 8 hours         05/29/24 0823     heparin (porcine) injection 1,000 Units  As needed (PRN)         05/21/24 1017     heparin (porcine) injection 3,000 Units  As needed (PRN)         04/17/24 0825                    Discharge Planning   ZEKE: 6/10/2024     Code Status: Full Code   Is the patient medically ready for discharge?: No    Reason for patient still in hospital (select all that apply): Patient trending condition  Discharge Plan A: [facility] placement - [facility]            Steven Miranda MD  Department of Hospital Medicine   [facility] - Telemetry Stepdown

## 2024-06-09 LAB
ALBUMIN SERPL BCP-MCNC: 3.4 G/DL (ref 3.5–5.2)
ALBUMIN SERPL BCP-MCNC: 3.4 G/DL (ref 3.5–5.2)
ALP SERPL-CCNC: 103 U/L (ref 55–135)
ALP SERPL-CCNC: 108 U/L (ref 55–135)
ALT SERPL W/O P-5'-P-CCNC: 30 U/L (ref 10–44)
ALT SERPL W/O P-5'-P-CCNC: 31 U/L (ref 10–44)
ANION GAP SERPL CALC-SCNC: 15 MMOL/L (ref 8–16)
ANION GAP SERPL CALC-SCNC: 15 MMOL/L (ref 8–16)
ANION GAP SERPL CALC-SCNC: 16 MMOL/L (ref 8–16)
AST SERPL-CCNC: 23 U/L (ref 10–40)
AST SERPL-CCNC: 24 U/L (ref 10–40)
BASOPHILS # BLD AUTO: 0.05 K/UL (ref 0–0.2)
BASOPHILS NFR BLD: 0.5 % (ref 0–1.9)
BILIRUB SERPL-MCNC: 0.3 MG/DL (ref 0.1–1)
BILIRUB SERPL-MCNC: 0.4 MG/DL (ref 0.1–1)
BUN SERPL-MCNC: 48 MG/DL (ref 6–20)
BUN SERPL-MCNC: 57 MG/DL (ref 6–20)
BUN SERPL-MCNC: 60 MG/DL (ref 6–20)
CALCIUM SERPL-MCNC: 10.1 MG/DL (ref 8.7–10.5)
CALCIUM SERPL-MCNC: 10.2 MG/DL (ref 8.7–10.5)
CALCIUM SERPL-MCNC: 10.8 MG/DL (ref 8.7–10.5)
CHLORIDE SERPL-SCNC: 88 MMOL/L (ref 95–110)
CHLORIDE SERPL-SCNC: 92 MMOL/L (ref 95–110)
CHLORIDE SERPL-SCNC: 93 MMOL/L (ref 95–110)
CO2 SERPL-SCNC: 19 MMOL/L (ref 23–29)
CO2 SERPL-SCNC: 20 MMOL/L (ref 23–29)
CO2 SERPL-SCNC: 21 MMOL/L (ref 23–29)
CREAT SERPL-MCNC: 6 MG/DL (ref 0.5–1.4)
CREAT SERPL-MCNC: 7.2 MG/DL (ref 0.5–1.4)
CREAT SERPL-MCNC: 7.2 MG/DL (ref 0.5–1.4)
DIFFERENTIAL METHOD BLD: ABNORMAL
EOSINOPHIL # BLD AUTO: 0.3 K/UL (ref 0–0.5)
EOSINOPHIL NFR BLD: 3.3 % (ref 0–8)
ERYTHROCYTE [DISTWIDTH] IN BLOOD BY AUTOMATED COUNT: 15.8 % (ref 11.5–14.5)
EST. GFR  (NO RACE VARIABLE): 6.3 ML/MIN/1.73 M^2
EST. GFR  (NO RACE VARIABLE): 6.3 ML/MIN/1.73 M^2
EST. GFR  (NO RACE VARIABLE): 7.8 ML/MIN/1.73 M^2
GLUCOSE SERPL-MCNC: 179 MG/DL (ref 70–110)
GLUCOSE SERPL-MCNC: 192 MG/DL (ref 70–110)
GLUCOSE SERPL-MCNC: 378 MG/DL (ref 70–110)
HCT VFR BLD AUTO: 34.8 % (ref 37–48.5)
HGB BLD-MCNC: 10.2 G/DL (ref 12–16)
IMM GRANULOCYTES # BLD AUTO: 0.05 K/UL (ref 0–0.04)
IMM GRANULOCYTES NFR BLD AUTO: 0.5 % (ref 0–0.5)
LYMPHOCYTES # BLD AUTO: 2.3 K/UL (ref 1–4.8)
LYMPHOCYTES NFR BLD: 24.5 % (ref 18–48)
MCH RBC QN AUTO: 24.9 PG (ref 27–31)
MCHC RBC AUTO-ENTMCNC: 29.3 G/DL (ref 32–36)
MCV RBC AUTO: 85 FL (ref 82–98)
MONOCYTES # BLD AUTO: 1.2 K/UL (ref 0.3–1)
MONOCYTES NFR BLD: 12 % (ref 4–15)
NEUTROPHILS # BLD AUTO: 5.7 K/UL (ref 1.8–7.7)
NEUTROPHILS NFR BLD: 59.2 % (ref 38–73)
NRBC BLD-RTO: 0 /100 WBC
PLATELET # BLD AUTO: 323 K/UL (ref 150–450)
PMV BLD AUTO: 10.7 FL (ref 9.2–12.9)
POCT GLUCOSE: 144 MG/DL (ref 70–110)
POCT GLUCOSE: 157 MG/DL (ref 70–110)
POCT GLUCOSE: 160 MG/DL (ref 70–110)
POCT GLUCOSE: 176 MG/DL (ref 70–110)
POCT GLUCOSE: 190 MG/DL (ref 70–110)
POCT GLUCOSE: 214 MG/DL (ref 70–110)
POCT GLUCOSE: 217 MG/DL (ref 70–110)
POCT GLUCOSE: 229 MG/DL (ref 70–110)
POCT GLUCOSE: 253 MG/DL (ref 70–110)
POCT GLUCOSE: 310 MG/DL (ref 70–110)
POCT GLUCOSE: 362 MG/DL (ref 70–110)
POCT GLUCOSE: 437 MG/DL (ref 70–110)
POTASSIUM SERPL-SCNC: 6.2 MMOL/L (ref 3.5–5.1)
POTASSIUM SERPL-SCNC: 6.2 MMOL/L (ref 3.5–5.1)
POTASSIUM SERPL-SCNC: 6.3 MMOL/L (ref 3.5–5.1)
POTASSIUM SERPL-SCNC: 6.5 MMOL/L (ref 3.5–5.1)
PROT SERPL-MCNC: 8.6 G/DL (ref 6–8.4)
PROT SERPL-MCNC: 8.8 G/DL (ref 6–8.4)
RBC # BLD AUTO: 4.1 M/UL (ref 4–5.4)
SODIUM SERPL-SCNC: 124 MMOL/L (ref 136–145)
SODIUM SERPL-SCNC: 127 MMOL/L (ref 136–145)
SODIUM SERPL-SCNC: 128 MMOL/L (ref 136–145)
WBC # BLD AUTO: 9.57 K/UL (ref 3.9–12.7)

## 2024-06-09 PROCEDURE — 80048 BASIC METABOLIC PNL TOTAL CA: CPT | Mod: XB

## 2024-06-09 PROCEDURE — 20600001 HC STEP DOWN PRIVATE ROOM

## 2024-06-09 PROCEDURE — 63600175 PHARM REV CODE 636 W HCPCS

## 2024-06-09 PROCEDURE — 25000003 PHARM REV CODE 250: Performed by: STUDENT IN AN ORGANIZED HEALTH CARE EDUCATION/TRAINING PROGRAM

## 2024-06-09 PROCEDURE — 63600175 PHARM REV CODE 636 W HCPCS: Performed by: NURSE PRACTITIONER

## 2024-06-09 PROCEDURE — 36415 COLL VENOUS BLD VENIPUNCTURE: CPT

## 2024-06-09 PROCEDURE — 25000003 PHARM REV CODE 250

## 2024-06-09 PROCEDURE — 93010 ELECTROCARDIOGRAM REPORT: CPT | Mod: ,,, | Performed by: INTERNAL MEDICINE

## 2024-06-09 PROCEDURE — 27000207 HC ISOLATION

## 2024-06-09 PROCEDURE — 93005 ELECTROCARDIOGRAM TRACING: CPT

## 2024-06-09 PROCEDURE — 25000242 PHARM REV CODE 250 ALT 637 W/ HCPCS: Performed by: STUDENT IN AN ORGANIZED HEALTH CARE EDUCATION/TRAINING PROGRAM

## 2024-06-09 PROCEDURE — 80053 COMPREHEN METABOLIC PANEL: CPT | Mod: 91

## 2024-06-09 PROCEDURE — 63600175 PHARM REV CODE 636 W HCPCS: Mod: JZ,JG | Performed by: INTERNAL MEDICINE

## 2024-06-09 PROCEDURE — 99900035 HC TECH TIME PER 15 MIN (STAT)

## 2024-06-09 PROCEDURE — 80100014 HC HEMODIALYSIS 1:1

## 2024-06-09 PROCEDURE — 99233 SBSQ HOSP IP/OBS HIGH 50: CPT | Mod: ,,, | Performed by: INTERNAL MEDICINE

## 2024-06-09 PROCEDURE — 85025 COMPLETE CBC W/AUTO DIFF WBC: CPT

## 2024-06-09 RX ORDER — INSULIN ASPART 100 [IU]/ML
0-10 INJECTION, SOLUTION INTRAVENOUS; SUBCUTANEOUS
Status: DISCONTINUED | OUTPATIENT
Start: 2024-06-09 | End: 2024-07-08 | Stop reason: HOSPADM

## 2024-06-09 RX ORDER — ALBUTEROL SULFATE 2.5 MG/.5ML
10 SOLUTION RESPIRATORY (INHALATION) ONCE
Status: DISCONTINUED | OUTPATIENT
Start: 2024-06-09 | End: 2024-06-10

## 2024-06-09 RX ORDER — MUPIROCIN 20 MG/G
OINTMENT TOPICAL 2 TIMES DAILY
Status: COMPLETED | OUTPATIENT
Start: 2024-06-09 | End: 2024-06-13

## 2024-06-09 RX ORDER — OXYCODONE HCL 5 MG/5 ML
5 SOLUTION, ORAL ORAL EVERY 4 HOURS PRN
Status: DISCONTINUED | OUTPATIENT
Start: 2024-06-09 | End: 2024-07-08 | Stop reason: HOSPADM

## 2024-06-09 RX ORDER — INSULIN GLARGINE 100 [IU]/ML
13 INJECTION, SOLUTION SUBCUTANEOUS NIGHTLY
Status: DISCONTINUED | OUTPATIENT
Start: 2024-06-09 | End: 2024-07-08 | Stop reason: HOSPADM

## 2024-06-09 RX ADMIN — INSULIN ASPART 2 UNITS: 100 INJECTION, SOLUTION INTRAVENOUS; SUBCUTANEOUS at 07:06

## 2024-06-09 RX ADMIN — MUPIROCIN: 20 OINTMENT TOPICAL at 08:06

## 2024-06-09 RX ADMIN — Medication 500 MG: at 08:06

## 2024-06-09 RX ADMIN — SODIUM ZIRCONIUM CYCLOSILICATE 10 G: 5 POWDER, FOR SUSPENSION ORAL at 06:06

## 2024-06-09 RX ADMIN — PANTOPRAZOLE SODIUM 40 MG: 40 GRANULE, DELAYED RELEASE ORAL at 08:06

## 2024-06-09 RX ADMIN — HEPARIN SODIUM 5000 UNITS: 5000 INJECTION INTRAVENOUS; SUBCUTANEOUS at 05:06

## 2024-06-09 RX ADMIN — ATORVASTATIN CALCIUM 40 MG: 40 TABLET, FILM COATED ORAL at 08:06

## 2024-06-09 RX ADMIN — HEPARIN SODIUM 3000 UNITS: 1000 INJECTION, SOLUTION INTRAVENOUS; SUBCUTANEOUS at 01:06

## 2024-06-09 RX ADMIN — SODIUM ZIRCONIUM CYCLOSILICATE 10 G: 5 POWDER, FOR SUSPENSION ORAL at 08:06

## 2024-06-09 RX ADMIN — MUPIROCIN: 20 OINTMENT TOPICAL at 09:06

## 2024-06-09 RX ADMIN — OXYCODONE HYDROCHLORIDE 5 MG: 5 SOLUTION ORAL at 08:06

## 2024-06-09 RX ADMIN — METOPROLOL TARTRATE 25 MG: 25 TABLET, FILM COATED ORAL at 08:06

## 2024-06-09 RX ADMIN — INSULIN GLARGINE 13 UNITS: 100 INJECTION, SOLUTION SUBCUTANEOUS at 08:06

## 2024-06-09 RX ADMIN — HEPARIN SODIUM 5000 UNITS: 5000 INJECTION INTRAVENOUS; SUBCUTANEOUS at 01:06

## 2024-06-09 RX ADMIN — INSULIN HUMAN 10 UNITS: 100 INJECTION, SOLUTION PARENTERAL at 11:06

## 2024-06-09 RX ADMIN — SODIUM BICARBONATE: 84 INJECTION, SOLUTION INTRAVENOUS at 05:06

## 2024-06-09 RX ADMIN — ALTEPLASE 4 MG: 2.2 INJECTION, POWDER, LYOPHILIZED, FOR SOLUTION INTRAVENOUS at 04:06

## 2024-06-09 RX ADMIN — OXYCODONE HYDROCHLORIDE 5 MG: 5 SOLUTION ORAL at 01:06

## 2024-06-09 RX ADMIN — DEXTROSE MONOHYDRATE 500 ML: 100 INJECTION, SOLUTION INTRAVENOUS at 11:06

## 2024-06-09 RX ADMIN — INSULIN ASPART 10 UNITS: 100 INJECTION, SOLUTION INTRAVENOUS; SUBCUTANEOUS at 12:06

## 2024-06-09 RX ADMIN — HEPARIN SODIUM 5000 UNITS: 5000 INJECTION INTRAVENOUS; SUBCUTANEOUS at 09:06

## 2024-06-09 RX ADMIN — ASPIRIN 81 MG CHEWABLE TABLET 81 MG: 81 TABLET CHEWABLE at 08:06

## 2024-06-09 RX ADMIN — INSULIN ASPART 2 UNITS: 100 INJECTION, SOLUTION INTRAVENOUS; SUBCUTANEOUS at 08:06

## 2024-06-09 NOTE — NURSING
Dialysis RN to bedside for HD tx. Tx started, immediate issues with CVC. Unable to run past BFR of 180 and unable to reverse lines. Dr. Martinez notified. Orders to run for 2 hrs and then TPA line. After 2 hours post run, labs to be drawn and either dialysis team will try to run again tonight or keep TPA in overnight and pt to run tomorrow.  at bedside and updated on POC.

## 2024-06-09 NOTE — ASSESSMENT & PLAN NOTE
Adjusting insulin to account for diabetic TF    Patient's FSGs are controlled on current medication regimen.  Last A1c reviewed-   Lab Results   Component Value Date    HGBA1C 6.2 (H) 05/27/2024     Most recent fingerstick glucose reviewed-   Recent Labs   Lab 06/09/24  1102 06/09/24  1151 06/09/24  1157 06/09/24  1235   POCTGLUCOSE 214* 437* 362* 310*       Current correctional scale  Medium  Maintain anti-hyperglycemic dose as follows-   Antihyperglycemics (From admission, onward)      Start     Stop Route Frequency Ordered    06/09/24 2100  insulin glargine U-100 (Lantus) pen 13 Units         -- SubQ Nightly 06/09/24 0756    06/09/24 1255  insulin aspart U-100 pen 0-10 Units         -- SubQ Before meals & nightly PRN 06/09/24 1155          Hold Oral hypoglycemics while patient is in the hospital.  POCT glucose checks

## 2024-06-09 NOTE — PROGRESS NOTES
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 History was given from patient's daughter. She was undergoing dialysis today and noticed she was lethargic, less alert than usual self. Pt completed dialysis and still not acting herself. EMS was called, fever of a 100.  On chart review, she did have an episode of large volume emesis around 1700.  Per EMS, she had a slightly elevated temp 100.0°F, glucose 300s.     In the ED: UA 2+ leuks, >100 WBC, many bacteria, WBC 17, CT abd/pelvis concerning for cystitis. Given vanc/zosyn    Overview/Hospital Course:  Patient was admitted to Hospital Medicine service for medical management and evaluation of urosepsis. Patient was continued on vanc/zosyn. Vascular Neurology consulted concerning mental status change with the recommendation to initiate ASA 81 QD and to obtain an MRI to r/o new stroke. Imaging pending. Nephrology was consulted for regularly scheduled dialysis. Afternoon of 4/12, patient was unable to tolerate filtration of volume during dialsis 2/2 hypotension. Patient received 500cc bolus and was transferred back to floor after finishing the session without volume removed. Will monitor for signs of volume overload. Tailoring insulin regimen while uptitrating tube feeds to goal rate. Staph Epi in all 4 bottles; ID with recommendation to continue Vanc & de-escalate meropenem to ertapenem on 04/15 through 4/16. Patient completed IV course of UTI coverage. Patient tolerated volume removal well in dialysis  throughout the rest of her hospital stay. Pending discharge to LTACH pending bed availability. Patient without BM with one episode of vomiting overnight 4/17. KUB with bowel gas and low concern for obstruction with no transition point noted. Escalating bowel regimen. Pending placement as of 4/22. Pulm consult placed to evaluate for possible trach downsize & eventual decannulation to assist placement if safe. Trach capping trial per pulm for 48h and will assess for decannulation on Monday. DVT studies negative. Pulm successfully decannulated pt 4/30, IR exchanged TDC. Pending swallow study with SLP and waiting for dispo options. Pt failed swallow study, placement pending. Working with PT on mobilize pt to sitting in a chair to expand placement options. Pt successfully mobilized using sandee lift but required 2 people to do so. Discussing dispo plan with family, likely d/c home if HD, DME needs able to be met. Pending acceptance at outpatient HD center. Ongoing placement difficulties d/t need for accepting HD facility to have sandee lift with 2 personnel to operate. Family meeting to discuss disposition options planned for Thursday 5/23 at 1 PM. KARI onboard for placement to [facility] to continue dialysis. Once patient gets accepted at [facility], next step will be to work with daughter on [facility] NH placement. Hyperkalamic, given lokelma x1. Hyperkalemic, shifted K with lokelma x 1 and regular insulin. Nephrology to take on dyalisis today. Optimizing insulin BG and insulin management. SW onboard working on paperwork for long term Medicaid application. Pending NH placement.       Interval History: Hyperkalemic, shifted K with lokelma x 1 and regular insulin. Optimizing insulin BG and insulin management. Pending NH dispo. Patient with have dialysis session today.     Review of Systems   Unable to perform ROS: Patient nonverbal     Objective:     Vital Signs (Most Recent):  Temp: 98.2 °F (36.8 °C) (06/09/24  1124)  Pulse: 89 (06/09/24 1124)  Resp: 19 (06/09/24 1124)  BP: 120/79 (06/09/24 1124)  SpO2: 97 % (06/09/24 1124) Vital Signs (24h Range):  Temp:  [97.5 °F (36.4 °C)-98.8 °F (37.1 °C)] 98.2 °F (36.8 °C)  Pulse:  [] 89  Resp:  [18-20] 19  SpO2:  [97 %-100 %] 97 %  BP: (110-144)/(79-90) 120/79     Weight: 105.5 kg (232 lb 9.4 oz)  Body mass index is 36.43 kg/m².  No intake or output data in the 24 hours ending 06/09/24 1301      Physical Exam  Vitals and nursing note reviewed.   Constitutional:       General: She is not in acute distress.     Appearance: She is not ill-appearing, toxic-appearing or diaphoretic.   HENT:      Head: Normocephalic and atraumatic.   Eyes:      General: No scleral icterus.        Right eye: No discharge.         Left eye: No discharge.      Conjunctiva/sclera: Conjunctivae normal.   Neck:      Comments: Decannulated  Cardiovascular:      Rate and Rhythm: Normal rate and regular rhythm.      Heart sounds: Normal heart sounds.   Pulmonary:      Effort: Pulmonary effort is normal. No respiratory distress.   Abdominal:      General: Abdomen is flat. There is no distension.      Tenderness: There is no abdominal tenderness.      Comments: PEG without signs of inflammation/bleeding     Musculoskeletal:      Right lower leg: No edema.      Left lower leg: No edema.   Skin:     General: Skin is warm and dry.   Neurological:      General: No focal deficit present.      Mental Status: She is alert.      Comments: Nonverbal             Significant Labs: All pertinent labs within the past 24 hours have been reviewed.    Significant Imaging: I have reviewed all pertinent imaging results/findings within the past 24 hours.    Assessment/Plan:      * Acute cystitis with hematuria  Resolved       Vulvovaginal candidiasis  Noted 5/29, given 150mg fluconazole x1      Irritant contact dermatitis due friction or contact with other specified body fluids  Wound care following        Debility  Needs:  Wheelchair: patient has a mobility limitation that significantly impairs her ability to participate in one or more mobility related activities of daily living (MRADL's) such as toileting, feeding, dressing, grooming, and bathing in customary locations in the home.  The mobility limitation cannot be sufficiently resolved by the use of a cane or walker.   The use of a manual wheelchair will significantly improve the patient's ability to participate in MRADLS and the patient will use it on regular basis in the home.  Family/pt has expressed her willingness to use a manual wheelchair in the home.  She also has a caregiver who is capable of assisting in mobility.    Mrs Saravia requires a hospital bed due to her requiring positioning of the body in ways not feasible with an ordinary bed to alleviate pain/ is completely immobile /or limited mobility and cannot independently make changes in body position without the use of the bed.  The positioning of the body cannot be sufficiently resolved by the use of pillows and wedges    Patient has a mobility limitation that significantly impairs their ability to participate in one or more mobility related activities of daily living, including toileting. This deficit can be resolved by using a bedside commode. Patient demonstrates mobility limitations that will cause them to be confined to one room at home without bathroom access for up to 30 days. Using a bedside commode will greatly improve the patient's ability to participate in MRADLs       Complication of vascular dialysis catheter  Cath intermittently clogging, not resolving with cath-hedy, going for IR venogram 4/30 with possible exchange. S/p exchange with IR on 4/30      Constipation  Resolved     Moderate malnutrition  Nutrition consulted. Most recent weight and BMI monitored-     Measurements:  Wt Readings from Last 1 Encounters:   06/04/24 105.5 kg (232 lb 9.4 oz)   Body mass index is 36.43  kg/m².    Patient has been screened and assessed by RD.    Malnutrition Type:  Context: acute illness or injury  Level: moderate    Malnutrition Characteristic Summary:  Weight Loss (Malnutrition): 10% in 6 months  Subcutaneous Fat (Malnutrition): mild depletion  Muscle Mass (Malnutrition): mild depletion  Fluid Accumulation (Malnutrition): mild    Interventions/Recommendations (treatment strategy):  1.    - on tube feeds   - previous history of failed swallow studies    Prolonged QT interval  Qtc 526 [04/10/24]      limit Qtc prolonging drugs as able    Spastic hemiplegia of right dominant side as late effect of cerebrovascular disease  Important that patient is able to sit in chair for 4h for dialysis placement needs, even if she requires lift/assistance getting into the chair     This patient has Chronic right hemiplegia due to stroke. Physical therapy services has been scheduled. Continue all standard measures for pressure injury prevention and consult wound care for any wounds (chronic or acute).    ESRD (end stage renal disease) on dialysis  Creatine stable for now. BMP reviewed- noted Estimated Creatinine Clearance: 11.3 mL/min (A) (based on SCr of 7.2 mg/dL (H)). according to latest data. Based on current GFR, CKD stage is end stage.  Monitor UOP and serial BMP and adjust therapy as needed. Renally dose meds. Avoid nephrotoxic medications and procedures.    SW onboard for placement to [facility] to continue dialysis. Once patient gets accepted at [facility], next step will be to work with daughter on shelter NH placement.     -- HD MWF (~1L fluid removal on average per session)  -- nephro following    PEG (percutaneous endoscopic gastrostomy) status  On PEG tube. Re-consulting wound care for PEG tube assistance to continue care at nursing home. Wound care with PEG dressing changes.         Status post tracheostomy  Resolved, decannulated 4/30    Acute encephalopathy  Resolved.     Type 2 diabetes  mellitus with hyperglycemia, with long-term current use of insulin  Adjusting insulin to account for diabetic TF    Patient's FSGs are controlled on current medication regimen.  Last A1c reviewed-   Lab Results   Component Value Date    HGBA1C 6.2 (H) 05/27/2024     Most recent fingerstick glucose reviewed-   Recent Labs   Lab 06/09/24  1102 06/09/24  1151 06/09/24  1157 06/09/24  1235   POCTGLUCOSE 214* 437* 362* 310*       Current correctional scale  Medium  Maintain anti-hyperglycemic dose as follows-   Antihyperglycemics (From admission, onward)      Start     Stop Route Frequency Ordered    06/09/24 2100  insulin glargine U-100 (Lantus) pen 13 Units         -- SubQ Nightly 06/09/24 0756    06/09/24 1255  insulin aspart U-100 pen 0-10 Units         -- SubQ Before meals & nightly PRN 06/09/24 1155          Hold Oral hypoglycemics while patient is in the hospital.  POCT glucose checks     Hyponatremia  Patient has hyponatremia which is uncontrolled,We will aim to correct the sodium by 4-6mEq in 24 hours. We will monitor sodium Daily. The hyponatremia is due to ESRD. Discussed with nephrology, dialysate bath adjusted to account for and correct hyponatremia.  Recent Labs   Lab 06/09/24  1147   *       - Daily morning CMP  - Continue to montior    Ros's gangrene  Pt experienced severe episode of ros's gangrene in January of 2024. Pt underwent extensive course, currently still healing from this, but no longer has wound vac. Pt's wound currently covered with bandage. Picture in media tabs    Plan  Wound care        VTE Risk Mitigation (From admission, onward)           Ordered     heparin (porcine) injection 5,000 Units  Every 8 hours         05/29/24 0823     heparin (porcine) injection 1,000 Units  As needed (PRN)         05/21/24 1017     heparin (porcine) injection 3,000 Units  As needed (PRN)         04/17/24 0825                    Discharge Planning   ZEKE: 6/10/2024     Code Status: Full Code    Is the patient medically ready for discharge?: No    Reason for patient still in hospital (select all that apply): Patient trending condition  Discharge Plan A: [facility]          Steven Miranda MD  Department of Hospital Medicine   [facility] - Telemetry Stepdown

## 2024-06-09 NOTE — PROGRESS NOTES
[facility] - Telemetry Stepdown  Nephrology  Progress Note    Patient Name: Sarah Saravia  MRN: [id number]  Admission Date: 4/10/2024  Hospital Length of Stay: 60 days  Attending Provider: Soco Erickson MD   Primary Care Physician: Deanna, Primary Doctor  Principal Problem:Acute cystitis with hematuria    Subjective:     HPI: The patient is a 53 y.o. 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 The patient is unable to provide adequate history. Additional history and patient information was obtained from patient's daughter, past medical records and ER records. Per chart, she was undergoing dialysis Wednesday and was noted to be more lethargic than usual despite completing her HD session. EMS was called, fever of a 100. In the ED, UA 2+ leuks, >100 WBC, many bacteria, WBC 17, CT abd/pelvis concerning for cystitis. Given vanc/zosyn and admitted.     Of note, the patient was initiated on RRT in February 2024 after being admitted with ALVARADO 2/2 iATN due to septic shock. Perm cath placed on 2/29/24. She was transferred to [facility] on 3/12. Nephrology consulted for management of ESRD and HD treatment.          Review of patient's allergies indicates:  No Known Allergies  Current Facility-Administered Medications   Medication Frequency    [START ON 6/10/2024] 0.9%  NaCl infusion Once    acetaminophen oral solution 650 mg Q6H PRN    albuterol sulfate nebulizer solution 10 mg Once    ascorbic acid (vitamin C) tablet 500 mg BID    aspirin chewable tablet 81 mg Daily    atorvastatin tablet 40 mg Daily    dextrose 10 % infusion Continuous PRN    dextrose 10% bolus 125 mL 125 mL PRN    dextrose 10% bolus 250 mL 250 mL PRN    dextrose 10% bolus 250 mL 250 mL PRN    epoetin merry injection 6,100 Units Every Mon, Wed, Fri    glucagon (human recombinant) injection 1 mg PRN     glucose chewable tablet 16 g PRN    glucose chewable tablet 24 g PRN    heparin (porcine) injection 1,000 Units PRN    heparin (porcine) injection 3,000 Units PRN    heparin (porcine) injection 5,000 Units Q8H    hepatitis B virus vacc.rec(PF) injection 20 mcg vaccine x 1 dose    insulin aspart U-100 pen 0-10 Units QID (AC + HS) PRN    insulin glargine U-100 (Lantus) pen 13 Units QHS    metoprolol tartrate (LOPRESSOR) tablet 25 mg BID    mupirocin 2 % ointment BID    ondansetron 4 mg/5 mL solution 4 mg Q6H PRN    oxyCODONE 5 mg/5 mL solution 5 mg Q4H PRN    pantoprazole suspension 40 mg Daily    sodium bicarbonate 150 mEq in dextrose 5 % (D5W) 1,000 mL infusion Continuous    sodium chloride 0.9% bolus 250 mL 250 mL PRN    sodium chloride 0.9% flush 10 mL Q12H PRN    sodium zirconium cyclosilicate packet 10 g TID       Objective:     Vital Signs (Most Recent):  Temp: 98.1 °F (36.7 °C) (06/09/24 1345)  Pulse: 96 (06/09/24 1630)  Resp: (!) 21 (06/09/24 1630)  BP: 114/85 (06/09/24 1630)  SpO2: 97 % (06/09/24 1124) Vital Signs (24h Range):  Temp:  [97.5 °F (36.4 °C)-98.7 °F (37.1 °C)] 98.1 °F (36.7 °C)  Pulse:  [] 96  Resp:  [16-21] 21  SpO2:  [97 %-100 %] 97 %  BP: (105-144)/(65-87) 114/85     Weight: 105.5 kg (232 lb 9.4 oz) (06/04/24 1052)  Body mass index is 36.43 kg/m².  Body surface area is 2.23 meters squared.    I/O last 3 completed shifts:  In: 200 [NG/GT:200]  Out: -      Physical Exam  Vitals and nursing note reviewed.   Cardiovascular:      Rate and Rhythm: Normal rate.   Pulmonary:      Effort: Pulmonary effort is normal.   Neurological:      Mental Status: She is alert.          Significant Labs:  CBC:   Recent Labs   Lab 06/09/24 0901   WBC 9.57   RBC 4.10   HGB 10.2*   HCT 34.8*      MCV 85   MCH 24.9*   MCHC 29.3*     CMP:   Recent Labs   Lab 06/09/24 0901 06/09/24  1147   * 378*   CALCIUM 10.8* 10.1   ALBUMIN 3.4*  --    PROT 8.8*  --    * 124*   K 6.3* 6.2*  6.2*   CO2 19*  21*   CL 92* 88*   BUN 57* 60*   CREATININE 7.2* 7.2*   ALKPHOS 103  --    ALT 31  --    AST 23  --    BILITOT 0.3  --      All labs within the past 24 hours have been reviewed.         Assessment/Plan:     Renal/  ESRD (end stage renal disease) on dialysis  53 y.o. Black or  Female ESRD-HD M-W-F at [facility] presents to ED on 4/10/2024 with UTI.    Of note, the patient was initiated on RRT in February 2024 after being admitted with ALVARADO 2/2 iATN due to septic shock. Perm cath placed on 2/29/24. She was transferred to [facility] on 3/12. Deemed ESRD at [facility].  Nephrology consulted for inpatient ESRD-HD management    Assessment:   - plan for HD today for metabolic clearance and volume management  - Continue to monitor intake and output  - Please avoid gadolinium, fleets, phos-based laxatives, NSAIDs  - Dialysis thrice weekly unless more urgent indications arise. Will evaluate RRT requirements Daily.      Lab Results   Component Value Date    FESATURATED 10 (L) 03/15/2024    FERRITIN 414 (H) 03/15/2024       - Goal in ESRD is Hgb of 10-11. Continue EPO.      Secondary hyperparathyroid of renal origin   - phos 2.0 - no indication for phos binders           Thank you for your consult. I will follow-up with patient. Please contact us if you have any additional questions.    Magali De León MD  Nephrology  [facility] - Telemetry Stepdown

## 2024-06-09 NOTE — ASSESSMENT & PLAN NOTE
Creatine stable for now. BMP reviewed- noted Estimated Creatinine Clearance: 11.3 mL/min (A) (based on SCr of 7.2 mg/dL (H)). according to latest data. Based on current GFR, CKD stage is end stage.  Monitor UOP and serial BMP and adjust therapy as needed. Renally dose meds. Avoid nephrotoxic medications and procedures.    SW onboard for placement to [facility] to continue dialysis. Once patient gets accepted at [facility], next step will be to work with daughter on correction NH placement.     -- HD MWF (~1L fluid removal on average per session)  -- nephro following

## 2024-06-09 NOTE — SUBJECTIVE & OBJECTIVE
Interval History: Hyperkalemic, shifted K with lokelma x 1 and regular insulin. Optimizing insulin BG and insulin management. Pending NH dispo. Patient with have dialysis session today.     Review of Systems   Unable to perform ROS: Patient nonverbal     Objective:     Vital Signs (Most Recent):  Temp: 98.2 °F (36.8 °C) (06/09/24 1124)  Pulse: 89 (06/09/24 1124)  Resp: 19 (06/09/24 1124)  BP: 120/79 (06/09/24 1124)  SpO2: 97 % (06/09/24 1124) Vital Signs (24h Range):  Temp:  [97.5 °F (36.4 °C)-98.8 °F (37.1 °C)] 98.2 °F (36.8 °C)  Pulse:  [] 89  Resp:  [18-20] 19  SpO2:  [97 %-100 %] 97 %  BP: (110-144)/(79-90) 120/79     Weight: 105.5 kg (232 lb 9.4 oz)  Body mass index is 36.43 kg/m².  No intake or output data in the 24 hours ending 06/09/24 1301      Physical Exam  Vitals and nursing note reviewed.   Constitutional:       General: She is not in acute distress.     Appearance: She is not ill-appearing, toxic-appearing or diaphoretic.   HENT:      Head: Normocephalic and atraumatic.   Eyes:      General: No scleral icterus.        Right eye: No discharge.         Left eye: No discharge.      Conjunctiva/sclera: Conjunctivae normal.   Neck:      Comments: Decannulated  Cardiovascular:      Rate and Rhythm: Normal rate and regular rhythm.      Heart sounds: Normal heart sounds.   Pulmonary:      Effort: Pulmonary effort is normal. No respiratory distress.   Abdominal:      General: Abdomen is flat. There is no distension.      Tenderness: There is no abdominal tenderness.      Comments: PEG without signs of inflammation/bleeding     Musculoskeletal:      Right lower leg: No edema.      Left lower leg: No edema.   Skin:     General: Skin is warm and dry.   Neurological:      General: No focal deficit present.      Mental Status: She is alert.      Comments: Nonverbal             Significant Labs: All pertinent labs within the past 24 hours have been reviewed.    Significant Imaging: I have reviewed all pertinent  imaging results/findings within the past 24 hours.

## 2024-06-09 NOTE — PLAN OF CARE
Problem: Infection  Goal: Absence of Infection Signs and Symptoms  Outcome: Progressing     Problem: Skin Injury Risk Increased  Goal: Skin Health and Integrity  Outcome: Progressing     Problem: Adult Inpatient Plan of Care  Goal: Plan of Care Review  Outcome: Progressing     No acute changes noted this time. Family member at bedside. Patient care ongoing.

## 2024-06-09 NOTE — ASSESSMENT & PLAN NOTE
Patient has hyponatremia which is uncontrolled,We will aim to correct the sodium by 4-6mEq in 24 hours. We will monitor sodium Daily. The hyponatremia is due to ESRD. Discussed with nephrology, dialysate bath adjusted to account for and correct hyponatremia.  Recent Labs   Lab 06/09/24  1147   *       - Daily morning CMP  - Continue to montior

## 2024-06-09 NOTE — NURSING
Nurses Note -- 4 Eyes      6/9/2024   6:15 AM      Skin assessed during: Q Shift Change      [] No Altered Skin Integrity Present    []Prevention Measures Documented      [x] Yes- Altered Skin Integrity Present or Discovered   [] LDA Added if Not in Epic (Describe Wound)   [] New Altered Skin Integrity was Present on Admit and Documented in LDA   [x] Wound Image Taken    Wound Care Consulted? Yes    Attending Nurse:  Teresa Eastman RN/Staff Member:  Brain

## 2024-06-09 NOTE — ASSESSMENT & PLAN NOTE
53 y.o. Black or  Female ESRD-HD M-W-F at [facility] presents to ED on 4/10/2024 with UTI.    Of note, the patient was initiated on RRT in February 2024 after being admitted with ALVARADO 2/2 iATN due to septic shock. Perm cath placed on 2/29/24. She was transferred to [facility] on 3/12. Deemed ESRD at [facility].  Nephrology consulted for inpatient ESRD-HD management    Assessment:   - plan for HD today for metabolic clearance and volume management  - Continue to monitor intake and output  - Please avoid gadolinium, fleets, phos-based laxatives, NSAIDs  - Dialysis thrice weekly unless more urgent indications arise. Will evaluate RRT requirements Daily.      Lab Results   Component Value Date    FESATURATED 10 (L) 03/15/2024    FERRITIN 414 (H) 03/15/2024       - Goal in ESRD is Hgb of 10-11. Continue EPO.      Secondary hyperparathyroid of renal origin   - phos 2.0 - no indication for phos binders

## 2024-06-09 NOTE — ASSESSMENT & PLAN NOTE
On PEG tube. Re-consulting wound care for PEG tube assistance to continue care at nursing home. Wound care with PEG dressing changes.

## 2024-06-09 NOTE — SUBJECTIVE & OBJECTIVE
Review of patient's allergies indicates:  No Known Allergies  Current Facility-Administered Medications   Medication Frequency    [START ON 6/10/2024] 0.9%  NaCl infusion Once    acetaminophen oral solution 650 mg Q6H PRN    albuterol sulfate nebulizer solution 10 mg Once    ascorbic acid (vitamin C) tablet 500 mg BID    aspirin chewable tablet 81 mg Daily    atorvastatin tablet 40 mg Daily    dextrose 10 % infusion Continuous PRN    dextrose 10% bolus 125 mL 125 mL PRN    dextrose 10% bolus 250 mL 250 mL PRN    dextrose 10% bolus 250 mL 250 mL PRN    epoetin merry injection 6,100 Units Every Mon, Wed, Fri    glucagon (human recombinant) injection 1 mg PRN    glucose chewable tablet 16 g PRN    glucose chewable tablet 24 g PRN    heparin (porcine) injection 1,000 Units PRN    heparin (porcine) injection 3,000 Units PRN    heparin (porcine) injection 5,000 Units Q8H    hepatitis B virus vacc.rec(PF) injection 20 mcg vaccine x 1 dose    insulin aspart U-100 pen 0-10 Units QID (AC + HS) PRN    insulin glargine U-100 (Lantus) pen 13 Units QHS    metoprolol tartrate (LOPRESSOR) tablet 25 mg BID    mupirocin 2 % ointment BID    ondansetron 4 mg/5 mL solution 4 mg Q6H PRN    oxyCODONE 5 mg/5 mL solution 5 mg Q4H PRN    pantoprazole suspension 40 mg Daily    sodium bicarbonate 150 mEq in dextrose 5 % (D5W) 1,000 mL infusion Continuous    sodium chloride 0.9% bolus 250 mL 250 mL PRN    sodium chloride 0.9% flush 10 mL Q12H PRN    sodium zirconium cyclosilicate packet 10 g TID       Objective:     Vital Signs (Most Recent):  Temp: 98.1 °F (36.7 °C) (06/09/24 1345)  Pulse: 96 (06/09/24 1630)  Resp: (!) 21 (06/09/24 1630)  BP: 114/85 (06/09/24 1630)  SpO2: 97 % (06/09/24 1124) Vital Signs (24h Range):  Temp:  [97.5 °F (36.4 °C)-98.7 °F (37.1 °C)] 98.1 °F (36.7 °C)  Pulse:  [] 96  Resp:  [16-21] 21  SpO2:  [97 %-100 %] 97 %  BP: (105-144)/(65-87) 114/85     Weight: 105.5 kg (232 lb 9.4 oz) (06/04/24 1052)  Body mass  index is 36.43 kg/m².  Body surface area is 2.23 meters squared.    I/O last 3 completed shifts:  In: 200 [NG/GT:200]  Out: -      Physical Exam  Vitals and nursing note reviewed.   Cardiovascular:      Rate and Rhythm: Normal rate.   Pulmonary:      Effort: Pulmonary effort is normal.   Neurological:      Mental Status: She is alert.          Significant Labs:  CBC:   Recent Labs   Lab 06/09/24 0901   WBC 9.57   RBC 4.10   HGB 10.2*   HCT 34.8*      MCV 85   MCH 24.9*   MCHC 29.3*     CMP:   Recent Labs   Lab 06/09/24 0901 06/09/24  1147   * 378*   CALCIUM 10.8* 10.1   ALBUMIN 3.4*  --    PROT 8.8*  --    * 124*   K 6.3* 6.2*  6.2*   CO2 19* 21*   CL 92* 88*   BUN 57* 60*   CREATININE 7.2* 7.2*   ALKPHOS 103  --    ALT 31  --    AST 23  --    BILITOT 0.3  --      All labs within the past 24 hours have been reviewed.

## 2024-06-10 PROBLEM — A41.9 SEPSIS: Status: RESOLVED | Noted: 2024-01-29 | Resolved: 2024-06-10

## 2024-06-10 PROBLEM — N39.0 UTI (URINARY TRACT INFECTION): Status: RESOLVED | Noted: 2024-03-08 | Resolved: 2024-06-10

## 2024-06-10 LAB
ALBUMIN SERPL BCP-MCNC: 2.9 G/DL (ref 3.5–5.2)
ALBUMIN SERPL BCP-MCNC: 3 G/DL (ref 3.5–5.2)
ALLENS TEST: ABNORMAL
ANION GAP SERPL CALC-SCNC: 14 MMOL/L (ref 8–16)
ANION GAP SERPL CALC-SCNC: 15 MMOL/L (ref 8–16)
ANION GAP SERPL CALC-SCNC: 15 MMOL/L (ref 8–16)
ANION GAP SERPL CALC-SCNC: 18 MMOL/L (ref 8–16)
BASOPHILS # BLD AUTO: 0.04 K/UL (ref 0–0.2)
BASOPHILS NFR BLD: 0.3 % (ref 0–1.9)
BUN SERPL-MCNC: 21 MG/DL (ref 6–20)
BUN SERPL-MCNC: 21 MG/DL (ref 6–20)
BUN SERPL-MCNC: 22 MG/DL (ref 6–20)
BUN SERPL-MCNC: 46 MG/DL (ref 6–20)
CALCIUM SERPL-MCNC: 9.2 MG/DL (ref 8.7–10.5)
CALCIUM SERPL-MCNC: 9.2 MG/DL (ref 8.7–10.5)
CALCIUM SERPL-MCNC: 9.4 MG/DL (ref 8.7–10.5)
CALCIUM SERPL-MCNC: 9.8 MG/DL (ref 8.7–10.5)
CHLORIDE SERPL-SCNC: 89 MMOL/L (ref 95–110)
CHLORIDE SERPL-SCNC: 92 MMOL/L (ref 95–110)
CHLORIDE SERPL-SCNC: 95 MMOL/L (ref 95–110)
CHLORIDE SERPL-SCNC: 95 MMOL/L (ref 95–110)
CO2 SERPL-SCNC: 17 MMOL/L (ref 23–29)
CO2 SERPL-SCNC: 17 MMOL/L (ref 23–29)
CO2 SERPL-SCNC: 22 MMOL/L (ref 23–29)
CO2 SERPL-SCNC: 22 MMOL/L (ref 23–29)
CREAT SERPL-MCNC: 3.3 MG/DL (ref 0.5–1.4)
CREAT SERPL-MCNC: 3.3 MG/DL (ref 0.5–1.4)
CREAT SERPL-MCNC: 3.6 MG/DL (ref 0.5–1.4)
CREAT SERPL-MCNC: 6.2 MG/DL (ref 0.5–1.4)
CRP SERPL-MCNC: 87.6 MG/L (ref 0–8.2)
DELSYS: ABNORMAL
DIFFERENTIAL METHOD BLD: ABNORMAL
EOSINOPHIL # BLD AUTO: 0.1 K/UL (ref 0–0.5)
EOSINOPHIL NFR BLD: 0.6 % (ref 0–8)
ERYTHROCYTE [DISTWIDTH] IN BLOOD BY AUTOMATED COUNT: 15.9 % (ref 11.5–14.5)
EST. GFR  (NO RACE VARIABLE): 14.5 ML/MIN/1.73 M^2
EST. GFR  (NO RACE VARIABLE): 16.1 ML/MIN/1.73 M^2
EST. GFR  (NO RACE VARIABLE): 16.1 ML/MIN/1.73 M^2
EST. GFR  (NO RACE VARIABLE): 7.5 ML/MIN/1.73 M^2
FIO2: 21
GLUCOSE SERPL-MCNC: 177 MG/DL (ref 70–110)
GLUCOSE SERPL-MCNC: 177 MG/DL (ref 70–110)
GLUCOSE SERPL-MCNC: 200 MG/DL (ref 70–110)
GLUCOSE SERPL-MCNC: 236 MG/DL (ref 70–110)
HCO3 UR-SCNC: 27.2 MMOL/L (ref 24–28)
HCT VFR BLD AUTO: 33.3 % (ref 37–48.5)
HCT VFR BLD CALC: 30 %PCV (ref 36–54)
HGB BLD-MCNC: 9.8 G/DL (ref 12–16)
IMM GRANULOCYTES # BLD AUTO: 0.07 K/UL (ref 0–0.04)
IMM GRANULOCYTES NFR BLD AUTO: 0.5 % (ref 0–0.5)
LACTATE SERPL-SCNC: 3.2 MMOL/L (ref 0.5–2.2)
LYMPHOCYTES # BLD AUTO: 0.8 K/UL (ref 1–4.8)
LYMPHOCYTES NFR BLD: 5.7 % (ref 18–48)
MCH RBC QN AUTO: 25.3 PG (ref 27–31)
MCHC RBC AUTO-ENTMCNC: 29.4 G/DL (ref 32–36)
MCV RBC AUTO: 86 FL (ref 82–98)
MODE: ABNORMAL
MONOCYTES # BLD AUTO: 1 K/UL (ref 0.3–1)
MONOCYTES NFR BLD: 6.8 % (ref 4–15)
NEUTROPHILS # BLD AUTO: 12.3 K/UL (ref 1.8–7.7)
NEUTROPHILS NFR BLD: 86.1 % (ref 38–73)
NRBC BLD-RTO: 0 /100 WBC
OHS QRS DURATION: 134 MS
OHS QRS DURATION: 138 MS
OHS QTC CALCULATION: 491 MS
OHS QTC CALCULATION: 495 MS
PCO2 BLDA: 45.5 MMHG (ref 35–45)
PH SMN: 7.38 [PH] (ref 7.35–7.45)
PHOSPHATE SERPL-MCNC: 2.6 MG/DL (ref 2.7–4.5)
PHOSPHATE SERPL-MCNC: 2.9 MG/DL (ref 2.7–4.5)
PLATELET # BLD AUTO: 182 K/UL (ref 150–450)
PMV BLD AUTO: 10.3 FL (ref 9.2–12.9)
PO2 BLDA: 33 MMHG (ref 40–60)
POC BE: 2 MMOL/L
POC IONIZED CALCIUM: 1.14 MMOL/L (ref 1.06–1.42)
POC SATURATED O2: 63 % (ref 95–100)
POC TCO2: 29 MMOL/L (ref 24–29)
POCT GLUCOSE: 166 MG/DL (ref 70–110)
POCT GLUCOSE: 191 MG/DL (ref 70–110)
POCT GLUCOSE: 208 MG/DL (ref 70–110)
POCT GLUCOSE: 209 MG/DL (ref 70–110)
POCT GLUCOSE: 222 MG/DL (ref 70–110)
POCT GLUCOSE: 226 MG/DL (ref 70–110)
POCT GLUCOSE: 251 MG/DL (ref 70–110)
POCT GLUCOSE: 263 MG/DL (ref 70–110)
POCT GLUCOSE: 293 MG/DL (ref 70–110)
POCT GLUCOSE: 423 MG/DL (ref 70–110)
POCT GLUCOSE: 472 MG/DL (ref 70–110)
POTASSIUM BLD-SCNC: 4.8 MMOL/L (ref 3.5–5.1)
POTASSIUM SERPL-SCNC: 4.1 MMOL/L (ref 3.5–5.1)
POTASSIUM SERPL-SCNC: 4.7 MMOL/L (ref 3.5–5.1)
POTASSIUM SERPL-SCNC: 6.1 MMOL/L (ref 3.5–5.1)
POTASSIUM SERPL-SCNC: 6.1 MMOL/L (ref 3.5–5.1)
RBC # BLD AUTO: 3.87 M/UL (ref 4–5.4)
SAMPLE: ABNORMAL
SITE: ABNORMAL
SODIUM BLD-SCNC: 123 MMOL/L (ref 136–145)
SODIUM SERPL-SCNC: 127 MMOL/L (ref 136–145)
SODIUM SERPL-SCNC: 127 MMOL/L (ref 136–145)
SODIUM SERPL-SCNC: 128 MMOL/L (ref 136–145)
SODIUM SERPL-SCNC: 129 MMOL/L (ref 136–145)
WBC # BLD AUTO: 14.31 K/UL (ref 3.9–12.7)

## 2024-06-10 PROCEDURE — 80048 BASIC METABOLIC PNL TOTAL CA: CPT

## 2024-06-10 PROCEDURE — 25000003 PHARM REV CODE 250

## 2024-06-10 PROCEDURE — 87040 BLOOD CULTURE FOR BACTERIA: CPT | Mod: 59

## 2024-06-10 PROCEDURE — 83605 ASSAY OF LACTIC ACID: CPT | Performed by: STUDENT IN AN ORGANIZED HEALTH CARE EDUCATION/TRAINING PROGRAM

## 2024-06-10 PROCEDURE — 93005 ELECTROCARDIOGRAM TRACING: CPT

## 2024-06-10 PROCEDURE — 97535 SELF CARE MNGMENT TRAINING: CPT

## 2024-06-10 PROCEDURE — 99900035 HC TECH TIME PER 15 MIN (STAT)

## 2024-06-10 PROCEDURE — 63600175 PHARM REV CODE 636 W HCPCS

## 2024-06-10 PROCEDURE — 80100014 HC HEMODIALYSIS 1:1

## 2024-06-10 PROCEDURE — 63600175 PHARM REV CODE 636 W HCPCS: Performed by: NURSE PRACTITIONER

## 2024-06-10 PROCEDURE — 27000207 HC ISOLATION

## 2024-06-10 PROCEDURE — 80069 RENAL FUNCTION PANEL: CPT

## 2024-06-10 PROCEDURE — 36415 COLL VENOUS BLD VENIPUNCTURE: CPT

## 2024-06-10 PROCEDURE — 36415 COLL VENOUS BLD VENIPUNCTURE: CPT | Mod: XB

## 2024-06-10 PROCEDURE — 85025 COMPLETE CBC W/AUTO DIFF WBC: CPT | Performed by: STUDENT IN AN ORGANIZED HEALTH CARE EDUCATION/TRAINING PROGRAM

## 2024-06-10 PROCEDURE — 36415 COLL VENOUS BLD VENIPUNCTURE: CPT | Mod: XB | Performed by: STUDENT IN AN ORGANIZED HEALTH CARE EDUCATION/TRAINING PROGRAM

## 2024-06-10 PROCEDURE — 86140 C-REACTIVE PROTEIN: CPT | Performed by: STUDENT IN AN ORGANIZED HEALTH CARE EDUCATION/TRAINING PROGRAM

## 2024-06-10 PROCEDURE — 80069 RENAL FUNCTION PANEL: CPT | Mod: 91 | Performed by: STUDENT IN AN ORGANIZED HEALTH CARE EDUCATION/TRAINING PROGRAM

## 2024-06-10 PROCEDURE — 84132 ASSAY OF SERUM POTASSIUM: CPT

## 2024-06-10 PROCEDURE — 94640 AIRWAY INHALATION TREATMENT: CPT

## 2024-06-10 PROCEDURE — 94761 N-INVAS EAR/PLS OXIMETRY MLT: CPT

## 2024-06-10 PROCEDURE — 99232 SBSQ HOSP IP/OBS MODERATE 35: CPT | Mod: ,,, | Performed by: NURSE PRACTITIONER

## 2024-06-10 PROCEDURE — 93010 ELECTROCARDIOGRAM REPORT: CPT | Mod: ,,, | Performed by: INTERNAL MEDICINE

## 2024-06-10 PROCEDURE — 97530 THERAPEUTIC ACTIVITIES: CPT

## 2024-06-10 PROCEDURE — 20600001 HC STEP DOWN PRIVATE ROOM

## 2024-06-10 PROCEDURE — 25000242 PHARM REV CODE 250 ALT 637 W/ HCPCS

## 2024-06-10 PROCEDURE — 25000003 PHARM REV CODE 250: Performed by: STUDENT IN AN ORGANIZED HEALTH CARE EDUCATION/TRAINING PROGRAM

## 2024-06-10 PROCEDURE — 63600175 PHARM REV CODE 636 W HCPCS: Performed by: STUDENT IN AN ORGANIZED HEALTH CARE EDUCATION/TRAINING PROGRAM

## 2024-06-10 RX ORDER — POLYETHYLENE GLYCOL 3350 17 G/17G
17 POWDER, FOR SOLUTION ORAL 3 TIMES DAILY
Status: DISCONTINUED | OUTPATIENT
Start: 2024-06-10 | End: 2024-06-13

## 2024-06-10 RX ORDER — HEPARIN SODIUM 1000 [USP'U]/ML
1000 INJECTION, SOLUTION INTRAVENOUS; SUBCUTANEOUS
Status: ACTIVE | OUTPATIENT
Start: 2024-06-10 | End: 2024-06-11

## 2024-06-10 RX ORDER — ALBUTEROL SULFATE 2.5 MG/.5ML
10 SOLUTION RESPIRATORY (INHALATION) ONCE
Status: COMPLETED | OUTPATIENT
Start: 2024-06-10 | End: 2024-06-10

## 2024-06-10 RX ORDER — ACETAMINOPHEN 650 MG/20.3ML
650 LIQUID ORAL EVERY 6 HOURS PRN
Status: DISCONTINUED | OUTPATIENT
Start: 2024-06-10 | End: 2024-07-08 | Stop reason: HOSPADM

## 2024-06-10 RX ORDER — NAPROXEN SODIUM 220 MG/1
81 TABLET, FILM COATED ORAL DAILY
Status: DISCONTINUED | OUTPATIENT
Start: 2024-06-12 | End: 2024-07-08 | Stop reason: HOSPADM

## 2024-06-10 RX ADMIN — DEXTROSE MONOHYDRATE 500 ML: 100 INJECTION, SOLUTION INTRAVENOUS at 01:06

## 2024-06-10 RX ADMIN — CALCIUM GLUCONATE 1 G: 98 INJECTION, SOLUTION INTRAVENOUS at 01:06

## 2024-06-10 RX ADMIN — ASPIRIN 81 MG CHEWABLE TABLET 81 MG: 81 TABLET CHEWABLE at 08:06

## 2024-06-10 RX ADMIN — HEPARIN SODIUM 5000 UNITS: 5000 INJECTION INTRAVENOUS; SUBCUTANEOUS at 02:06

## 2024-06-10 RX ADMIN — INSULIN ASPART 6 UNITS: 100 INJECTION, SOLUTION INTRAVENOUS; SUBCUTANEOUS at 08:06

## 2024-06-10 RX ADMIN — ERYTHROPOIETIN 6100 UNITS: 10000 INJECTION, SOLUTION INTRAVENOUS; SUBCUTANEOUS at 03:06

## 2024-06-10 RX ADMIN — HEPARIN SODIUM 3000 UNITS: 1000 INJECTION, SOLUTION INTRAVENOUS; SUBCUTANEOUS at 02:06

## 2024-06-10 RX ADMIN — POLYETHYLENE GLYCOL 3350 17 G: 17 POWDER, FOR SOLUTION ORAL at 08:06

## 2024-06-10 RX ADMIN — HEPARIN SODIUM 5000 UNITS: 5000 INJECTION INTRAVENOUS; SUBCUTANEOUS at 09:06

## 2024-06-10 RX ADMIN — VANCOMYCIN HYDROCHLORIDE 1500 MG: 1.5 INJECTION, POWDER, LYOPHILIZED, FOR SOLUTION INTRAVENOUS at 09:06

## 2024-06-10 RX ADMIN — ALBUTEROL SULFATE 10 MG: 2.5 SOLUTION RESPIRATORY (INHALATION) at 01:06

## 2024-06-10 RX ADMIN — ATORVASTATIN CALCIUM 40 MG: 40 TABLET, FILM COATED ORAL at 08:06

## 2024-06-10 RX ADMIN — ACETAMINOPHEN 650 MG: 650 SOLUTION ORAL at 11:06

## 2024-06-10 RX ADMIN — INSULIN ASPART 6 UNITS: 100 INJECTION, SOLUTION INTRAVENOUS; SUBCUTANEOUS at 12:06

## 2024-06-10 RX ADMIN — INSULIN ASPART 4 UNITS: 100 INJECTION, SOLUTION INTRAVENOUS; SUBCUTANEOUS at 04:06

## 2024-06-10 RX ADMIN — ACETAMINOPHEN 650 MG: 650 SOLUTION ORAL at 04:06

## 2024-06-10 RX ADMIN — POLYETHYLENE GLYCOL 3350 17 G: 17 POWDER, FOR SOLUTION ORAL at 02:06

## 2024-06-10 RX ADMIN — INSULIN HUMAN 10 UNITS: 100 INJECTION, SOLUTION PARENTERAL at 02:06

## 2024-06-10 RX ADMIN — Medication 500 MG: at 08:06

## 2024-06-10 RX ADMIN — MUPIROCIN: 20 OINTMENT TOPICAL at 09:06

## 2024-06-10 RX ADMIN — SODIUM ZIRCONIUM CYCLOSILICATE 10 G: 5 POWDER, FOR SUSPENSION ORAL at 08:06

## 2024-06-10 RX ADMIN — HEPARIN SODIUM 5000 UNITS: 5000 INJECTION INTRAVENOUS; SUBCUTANEOUS at 05:06

## 2024-06-10 RX ADMIN — INSULIN GLARGINE 13 UNITS: 100 INJECTION, SOLUTION SUBCUTANEOUS at 09:06

## 2024-06-10 RX ADMIN — PANTOPRAZOLE SODIUM 40 MG: 40 GRANULE, DELAYED RELEASE ORAL at 08:06

## 2024-06-10 RX ADMIN — PIPERACILLIN SODIUM AND TAZOBACTAM SODIUM 4.5 G: 4; .5 INJECTION, POWDER, FOR SOLUTION INTRAVENOUS at 07:06

## 2024-06-10 RX ADMIN — MUPIROCIN: 20 OINTMENT TOPICAL at 08:06

## 2024-06-10 RX ADMIN — POLYETHYLENE GLYCOL 3350 17 G: 17 POWDER, FOR SOLUTION ORAL at 01:06

## 2024-06-10 RX ADMIN — SODIUM BICARBONATE: 84 INJECTION, SOLUTION INTRAVENOUS at 06:06

## 2024-06-10 RX ADMIN — INSULIN ASPART 2 UNITS: 100 INJECTION, SOLUTION INTRAVENOUS; SUBCUTANEOUS at 06:06

## 2024-06-10 RX ADMIN — METOPROLOL TARTRATE 25 MG: 25 TABLET, FILM COATED ORAL at 08:06

## 2024-06-10 RX ADMIN — INSULIN ASPART 1 UNITS: 100 INJECTION, SOLUTION INTRAVENOUS; SUBCUTANEOUS at 09:06

## 2024-06-10 RX ADMIN — SODIUM ZIRCONIUM CYCLOSILICATE 10 G: 5 POWDER, FOR SUSPENSION ORAL at 02:06

## 2024-06-10 NOTE — ASSESSMENT & PLAN NOTE
53 y.o. Black or  Female ESRD-HD M-W-F at [facility] presents to ED on 4/10/2024 with UTI.    Of note, the patient was initiated on RRT in February 2024 after being admitted with ALVARADO 2/2 iATN due to septic shock. Perm cath placed on 2/29/24. She was transferred to [facility] on 3/12. Deemed ESRD at [facility].  Nephrology consulted for inpatient ESRD-HD management    Assessment:   - HD completed this AM. K 4.1. No distress on exam. No further HD at this time.   - Continue to monitor intake and output  - Please avoid gadolinium, fleets, phos-based laxatives, NSAIDs  - Dialysis thrice weekly unless more urgent indications arise. Will evaluate RRT requirements Daily.      Lab Results   Component Value Date    FESATURATED 10 (L) 03/15/2024    FERRITIN 414 (H) 03/15/2024       - Goal in ESRD is Hgb of 10-11. Continue EPO. Hgb 9.8.      Secondary hyperparathyroid of renal origin   - phos 2.6 - no indication for phos binders

## 2024-06-10 NOTE — CONSULTS
[facility] - Telemetry Stepdown  General Surgery  Consult Note    Patient Name: Sarah Saravia  MRN: [id number]  Code Status: Full Code  Admission Date: 4/10/2024  Hospital Length of Stay: 61 days  Attending Physician: Soco Erickson MD  Primary Care Provider: Deanna Primary Doctor    Patient information was obtained from relative(s), past medical records, and ER records.     Inpatient consult to General Surgery  Consult performed by: Eliu Schulz MD  Consult ordered by: Jarrett Eason DO        Subjective:     Principal Problem: Acute cystitis with hematuria    History of Present Illness: Sarah Saravia is a 53 y.o. female with PMHx of Ayaz's with initial I&D/debridement in Jan 2024 and multiple trips to OR for wound vac placement, and trach/peg placement Feb 2024, DM, ESRD on HD who was admitted to hospital medicine. She has had prolonged hospitalization, partially due to issues with placement. She had a fever to 100.4 F overnight and tachycardia 100s to 120s. Her PEG tube has caused some ulceration of the overlying skin. She has had drainage from around her tube site. Her WBC increased to 14.3 (from 9.6) this morning and her lactate is 3.2. General surgery was consulted to evaluate PEG site as source of her infection.     No current facility-administered medications on file prior to encounter.     Current Outpatient Medications on File Prior to Encounter   Medication Sig    ascorbic acid, vitamin C, (VITAMIN C) 500 MG tablet 500 mg by Per G Tube route 2 (two) times daily.    pantoprazole sodium (PANTOPRAZOLE ORAL) 40 mg once daily. 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    Hypermagnesemia 04/11/2024    POA, Mg 3.2  Daily chem       Morbid obesity     Necrotizing fasciitis      Past Surgical History:   Procedure Laterality Date    CLOSURE OF WOUND Right  2/22/2024    Procedure: CLOSURE, WOUND;  Surgeon: Steve Cole MD;  Location: [facility] OR 2ND FLR;  Service: General;  Laterality: Right;    ESOPHAGOGASTRODUODENOSCOPY W/ PEG N/A 2/22/2024    Procedure: EGD, WITH PEG TUBE INSERTION;  Surgeon: Steve Cole MD;  Location: [facility] OR 2ND FLR;  Service: General;  Laterality: N/A;    INCISION AND DRAINAGE OF PERIRECTAL REGION N/A 1/29/2024    Procedure: INCISION AND DRAINAGE, PERIRECTAL REGION;  Surgeon: Axel Ramsay MD;  Location: [facility] OR;  Service: General;  Laterality: N/A;    LUMBAR PUNCTURE N/A 2/16/2024    Procedure: Lumbar Puncture;  Surgeon: Macy Boykin;  Location: [facility];  Service: Anesthesiology;  Laterality: N/A;    PLACEMENT, TRIALYSIS CATH Right 1/31/2024    Procedure: INSERTION, CATHETER, TRIPLE LUMEN, HEMODIALYSIS, TEMPORARY;  Surgeon: Alvin Junior MD;  Location: [facility] OR;  Service: General;  Laterality: Right;    REPLACEMENT OF WOUND VACUUM-ASSISTED CLOSURE DEVICE Right 2/12/2024    Procedure: REPLACEMENT, WOUND VAC;  Surgeon: Mundo Carmona MD;  Location: [facility] OR 2ND FLR;  Service: General;  Laterality: Right;    REPLACEMENT OF WOUND VACUUM-ASSISTED CLOSURE DEVICE N/A 2/15/2024    Procedure: REPLACEMENT, WOUND VAC;  Surgeon: Steve Cole MD;  Location: [facility] OR 2ND FLR;  Service: General;  Laterality: N/A;    REPLACEMENT OF WOUND VACUUM-ASSISTED CLOSURE DEVICE Right 2/19/2024    Procedure: REPLACEMENT, WOUND VAC;  Surgeon: Steve Cole MD;  Location: [facility] OR 2ND FLR;  Service: General;  Laterality: Right;  RLE/groin    REPLACEMENT OF WOUND VACUUM-ASSISTED CLOSURE DEVICE Right 2/22/2024    Procedure: REPLACEMENT, WOUND VAC;  Surgeon: Steve Cole MD;  Location: [facility] OR 2ND FLR;  Service: General;  Laterality: Right;    TRACHEOSTOMY N/A 2/22/2024    Procedure: CREATION, TRACHEOSTOMY;  Surgeon: Steve Cole MD;  Location: [facility] OR 2ND FLR;  Service: General;  Laterality: N/A;    WOUND DEBRIDEMENT Bilateral 2/2/2024    Procedure:  DEBRIDEMENT, WOUND;  Surgeon: Steve Cole MD;  [facility];  Service: General;  Laterality: Bilateral;  Bilateral groin  Possible wound vac placement    WOUND DEBRIDEMENT Right 2/6/2024    Procedure: DEBRIDEMENT, WOUND, replace wound vac, possible closure;  Surgeon: Steve Cole MD;  Location: [facility] OR 2ND FLR;  Service: General;  Laterality: Right;  RLE    WOUND DEBRIDEMENT Right 2/9/2024    Procedure: DEBRIDEMENT, WOUND w wound vac change;  Surgeon: Steve Cole MD;  [facility];  Service: General;  Laterality: Right;  RLE    WOUND DEBRIDEMENT Right 2/12/2024    Procedure: R thigh wound debridement;  Surgeon: Mundo Carmona MD;  [facility];  Service: General;  Laterality: Right;    WOUND EXPLORATION Right 1/31/2024    Procedure: IRRIGATION & DEBRIDEMENT, WOUND DEBRIDEMENT;  Surgeon: Alvin Junior MD;  Location: [facility] OR;  Service: General;  Laterality: Right;     Family History    None       Tobacco Use    Smoking status: Unknown    Smokeless tobacco: Not on file   Substance and Sexual Activity    Alcohol use: Not on file    Drug use: Not on file    Sexual activity: Not on file     Review of Systems   Unable to perform ROS: Patient nonverbal     Objective:     Vital Signs (Most Recent):  Temp: 99.9 °F (37.7 °C) (06/10/24 1135)  Pulse: 102 (06/10/24 1153)  Resp: 20 (06/10/24 1135)  BP: (!) 116/48 (06/10/24 1135)  SpO2: 97 % (06/10/24 1135) Vital Signs (24h Range):  Temp:  [98.9 °F (37.2 °C)-100.5 °F (38.1 °C)] 99.9 °F (37.7 °C)  Pulse:  [] 102  Resp:  [17-24] 20  SpO2:  [97 %-99 %] 97 %  BP: ()/(48-85) 116/48     Weight: 105.5 kg (232 lb 9.4 oz)  Body mass index is 36.43 kg/m².     Physical Exam  Vitals reviewed.   Constitutional:       General: She is not in acute distress.     Appearance: She is well-developed.   HENT:      Head: Normocephalic and atraumatic.   Eyes:      General:         Right eye: No discharge.         Left eye: No discharge.       Conjunctiva/sclera: Conjunctivae normal.   Cardiovascular:      Rate and Rhythm: Normal rate and regular rhythm.   Pulmonary:      Effort: Pulmonary effort is normal. No respiratory distress.   Abdominal:      General: There is no distension.      Palpations: Abdomen is soft.      Tenderness: There is no abdominal tenderness.      Comments: PEG site with bumper at 6 cm, ulceration of skin underlying bumper though the wound is healthy appearing  Some drainage around tube   Musculoskeletal:         General: No deformity.   Skin:     General: Skin is warm and dry.   Neurological:      Mental Status: She is alert and oriented to person, place, and time.   Psychiatric:         Behavior: Behavior normal.            I have reviewed all pertinent lab results within the past 24 hours.  CBC:   Recent Labs   Lab 06/10/24  0719   WBC 14.31*   RBC 3.87*   HGB 9.8*   HCT 33.3*      MCV 86   MCH 25.3*   MCHC 29.4*     CMP:   Recent Labs   Lab 06/09/24  1955 06/10/24  0006 06/10/24  1114   *   < > 236*   CALCIUM 10.2   < > 9.4   ALBUMIN 3.4*   < > 3.0*   PROT 8.6*  --   --    *   < > 128*   K 6.5*   < > 4.7   CO2 20*   < > 22*   CL 93*   < > 92*   BUN 48*   < > 22*   CREATININE 6.0*   < > 3.6*   ALKPHOS 108  --   --    ALT 30  --   --    AST 24  --   --    BILITOT 0.4  --   --     < > = values in this interval not displayed.       Significant Diagnostics:  I have reviewed all pertinent imaging results/findings within the past 24 hours.    Assessment/Plan:     PEG (percutaneous endoscopic gastrostomy) status  53 y.o. female with PMHx of Ayaz's with initial I&D/debridement in Jan 2024 and multiple trips to OR for wound vac placement, and trach/peg placement Feb 2024, DM, ESRD on HD who had fever overnight and increase in WBC    - PEG bumper loosened to 7 cm gloria. Drainage looks consistent with tube feeds though, not purulence. Recommend CT A/P to further evaluate position of PEG   - Would hold tube feeds  at this time  - Continue local wound care at PEG site  - Remainder of care per primary  - Please call with questions      VTE Risk Mitigation (From admission, onward)           Ordered     heparin (porcine) injection 1,000 Units  As needed (PRN)         06/10/24 0035     heparin (porcine) injection 5,000 Units  Every 8 hours         05/29/24 0823     heparin (porcine) injection 3,000 Units  As needed (PRN)         04/17/24 0825                      Eliu Schulz MD  General Surgery  [facility] - Telemetry Stepdown

## 2024-06-10 NOTE — SUBJECTIVE & OBJECTIVE
Interval History:   HD completed overnight due to hyperkalemia. K 6.1, now 4.1. No distress on exam. Oxygenating well on RA. No UF removed with HD. SBP 90-110s.  Na 127.    Review of patient's allergies indicates:  No Known Allergies  Current Facility-Administered Medications   Medication Frequency    acetaminophen oral solution 650 mg Q6H PRN    ascorbic acid (vitamin C) tablet 500 mg BID    aspirin chewable tablet 81 mg Daily    atorvastatin tablet 40 mg Daily    calcium gluconate 1 g in dextrose 5 % in water (D5W) 50 mL IVPB (MB+) Q10 Min PRN    dextrose 10 % infusion Continuous PRN    dextrose 10% bolus 125 mL 125 mL PRN    dextrose 10% bolus 250 mL 250 mL PRN    dextrose 10% bolus 250 mL 250 mL PRN    dextrose 10% bolus 250 mL 250 mL PRN    epoetin merry injection 6,100 Units Every Mon, Wed, Fri    glucagon (human recombinant) injection 1 mg PRN    glucose chewable tablet 16 g PRN    glucose chewable tablet 24 g PRN    heparin (porcine) injection 1,000 Units PRN    heparin (porcine) injection 3,000 Units PRN    heparin (porcine) injection 5,000 Units Q8H    hepatitis B virus vacc.rec(PF) injection 20 mcg vaccine x 1 dose    insulin aspart U-100 pen 0-10 Units QID (AC + HS) PRN    insulin glargine U-100 (Lantus) pen 13 Units QHS    metoprolol tartrate (LOPRESSOR) tablet 25 mg BID    mupirocin 2 % ointment BID    ondansetron 4 mg/5 mL solution 4 mg Q6H PRN    oxyCODONE 5 mg/5 mL solution 5 mg Q4H PRN    pantoprazole suspension 40 mg Daily    piperacillin-tazobactam (ZOSYN) 4.5 g in dextrose 5 % in water (D5W) 100 mL IVPB (MB+) Q12H    polyethylene glycol packet 17 g TID    sodium chloride 0.9% bolus 250 mL 250 mL PRN    sodium chloride 0.9% flush 10 mL Q12H PRN    sodium zirconium cyclosilicate packet 10 g TID    vancomycin - pharmacy to dose pharmacy to manage frequency    vancomycin 1,500 mg in dextrose 5 % (D5W) 250 mL IVPB (Vial-Mate) Once       Objective:     Vital Signs (Most Recent):  Temp: 99.6 °F (37.6  °C) (06/10/24 0759)  Pulse: (!) 116 (06/10/24 0759)  Resp: 20 (06/10/24 0759)  BP: 103/69 (06/10/24 0759)  SpO2: 98 % (06/10/24 0759) Vital Signs (24h Range):  Temp:  [98.1 °F (36.7 °C)-100.5 °F (38.1 °C)] 99.6 °F (37.6 °C)  Pulse:  [] 116  Resp:  [16-24] 20  SpO2:  [97 %-99 %] 98 %  BP: ()/(49-85) 103/69     Weight: 105.5 kg (232 lb 9.4 oz) (06/04/24 1052)  Body mass index is 36.43 kg/m².  Body surface area is 2.23 meters squared.    I/O last 3 completed shifts:  In: 1100 [I.V.:200; Other:700; NG/GT:200]  Out: 1900 [Other:1900]     Physical Exam  Vitals and nursing note reviewed.   HENT:      Head: Normocephalic.   Cardiovascular:      Rate and Rhythm: Normal rate.   Pulmonary:      Effort: Pulmonary effort is normal.   Abdominal:      General: There is no distension.      Tenderness: There is no abdominal tenderness.   Musculoskeletal:         General: No swelling.   Neurological:      Mental Status: She is alert.   Psychiatric:         Mood and Affect: Mood normal.          Significant Labs:  CBC:   Recent Labs   Lab 06/10/24  0719   WBC 14.31*   RBC 3.87*   HGB 9.8*   HCT 33.3*      MCV 86   MCH 25.3*   MCHC 29.4*     CMP:   Recent Labs   Lab 06/09/24  1955 06/10/24  0006 06/10/24  0616   *   < > 177*  177*   CALCIUM 10.2   < > 9.2  9.2   ALBUMIN 3.4*  --  2.9*   PROT 8.6*  --   --    *   < > 127*  127*   K 6.5*   < > 4.1  4.1  4.1   CO2 20*   < > 17*  17*   CL 93*   < > 95  95   BUN 48*   < > 21*  21*   CREATININE 6.0*   < > 3.3*  3.3*   ALKPHOS 108  --   --    ALT 30  --   --    AST 24  --   --    BILITOT 0.4  --   --     < > = values in this interval not displayed.     All labs within the past 24 hours have been reviewed.

## 2024-06-10 NOTE — ASSESSMENT & PLAN NOTE
"This patient does have evidence of infective focus  My overall impression is sepsis.  Source: Skin and Soft Tissue (location Abdominal)  Antibiotics given-   Antibiotics (72h ago, onward)      Start     Stop Route Frequency Ordered    06/10/24 0800  vancomycin - pharmacy to dose  (vancomycin IVPB (PEDS and ADULTS))        Placed in "And" Linked Group    -- IV pharmacy to manage frequency 06/10/24 0701    06/10/24 0730  piperacillin-tazobactam (ZOSYN) 4.5 g in dextrose 5 % in water (D5W) 100 mL IVPB (MB+)         -- IV Every 12 hours (non-standard times) 06/10/24 0628    06/09/24 0915  mupirocin 2 % ointment         06/14/24 0859 Nasl 2 times daily 06/09/24 0805          Latest lactate reviewed-  Recent Labs   Lab 06/10/24  1114   LACTATE 3.2*     Organ dysfunction indicated by Encephalopathy    Fluid challenge Contraindicated- Fluid bolus is contraindicated in this patient due to End Stage Renal Disease     Post- resuscitation assessment No - Post resuscitation assessment not needed       Will Not start Pressors- Levophed for MAP of 65  Source control achieved by: Vanc/Zosyn    Plan:  - Concerns septic source may be PEG site with associated purulent wound despite Wound Care attempts to protect with barriers. PEG placed by General Surgery 2/2024. General Surgery consulted regarding PEG site  - Will continue Vancomycin, Zosyn and deescalate as appropriate  - Blood cultures obtained, follow through maturation  - Chest X Ray unrevealing  "

## 2024-06-10 NOTE — PROGRESS NOTES
06/10/24 0230   During Hemodialysis Assessment   Blood Flow Rate (mL/min) 400 mL/min   Dialysate Flow Rate (mL/min) 800 ml/min   Ultrafiltration Rate (mL/Hr) 170 mL/Hr   Arteriovenous Lines Secure Yes   Arterial Pressure (mmHg) -160 mmHg   Venous Pressure (mmHg) 130   Blood Volume Processed (Liters) 0 L   UF Removed (mL) 0 mL   TMP 30   Venous Line in Air Detector Yes   Intake (mL) 200 mL   Transducer Dry Yes   Access Visible Yes    notified of access issue? N/A   Heparin given? Yes, see MAR   Intra-Hemodialysis Comments HD initiated     Report received from primary RN. HD started per MD order.

## 2024-06-10 NOTE — PLAN OF CARE
Pt's potassium remained high during the night. EKG, ABG , and dialysis ordered. Dialysis started and completed early in the morning. Tylenol administered for low grade fever. Safety measures in place. The call light is within reach. Pt care ongoing.     Problem: Infection  Goal: Absence of Infection Signs and Symptoms  Intervention: Prevent or Manage Infection  Flowsheets (Taken 6/10/2024 0518)  Isolation Precautions:   precautions maintained   contact  Problem: Skin Injury Risk Increased  Goal: Skin Health and Integrity  Intervention: Promote and Optimize Oral Intake  Flowsheets (Taken 6/10/2024 0518)  Oral Nutrition Promotion: rest periods promoted

## 2024-06-10 NOTE — PROGRESS NOTES
06/10/24 0545   Post-Hemodialysis Assessment   Rinseback Volume (mL) 200 mL   Blood Volume Processed (Liters) 65.5 L   Dialyzer Clearance Lightly streaked   Duration of Treatment 180 minutes   Additional Fluid Intake (mL) 400 mL   Total UF (mL) 400 mL   Net Fluid Removal 0   Patient Response to Treatment refugio well   Post-Hemodialysis Comments stable vs     HD completed per MD order. Report given to primary RN.

## 2024-06-10 NOTE — CLINICAL REVIEW
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    Last VS: BP (!) 98/54   Pulse (!) 116   Temp (!) 100.4 °F (38 °C)   Resp (!) 21   Ht 5' 7" (1.702 m)   Wt 105.5 kg (232 lb 9.4 oz)   SpO2 98%   BMI 36.43 kg/m²     24H Vital Sign Range:  Temp:  [98.1 °F (36.7 °C)-100.5 °F (38.1 °C)]   Pulse:  []   Resp:  [16-24]   BP: ()/(49-85)   SpO2:  [97 %-99 %]     Level of Consciousness (AVPU): alert    Recent Labs     06/08/24  0753 06/09/24  0901 06/10/24  0232   WBC 9.65 9.57  --    HGB 10.6* 10.2*  --    HCT 34.8* 34.8* 30*    323  --        Recent Labs     06/09/24  1147 06/09/24  1955 06/10/24  0006   * 128* 129*   K 6.2*  6.2* 6.5* 6.1*  6.1*   CL 88* 93* 89*   CO2 21* 20* 22*   BUN 60* 48* 46*   CREATININE 7.2* 6.0* 6.2*   * 179* 200*        Recent Labs     06/10/24  0232   PH 7.385   PCO2 45.5*   PO2 33*   HCO3 27.2   POCSATURATED 63   BE 2        OXYGEN:  Flow (L/min) (Oxygen Therapy): 2  Oxygen Concentration (%): 21       MEWS score: 3    Rounding completed w/ charge ELISABETH Jo. VSS. Repeat chemistry s/p HD pending. New fever overnight. CBC ordered.  No additional concerns verbalized at this time. Instructed to call [phone] for further concerns or assistance.    Monique Munson RN       " OB Anesthesia OB

## 2024-06-10 NOTE — PLAN OF CARE
Problem: Infection  Goal: Absence of Infection Signs and Symptoms  Outcome: Progressing     Problem: Skin Injury Risk Increased  Goal: Skin Health and Integrity  Outcome: Progressing     Problem: Adult Inpatient Plan of Care  Goal: Plan of Care Review  Outcome: Progressing       No acute distress noted this time. Safety measures maintained. Patient care ongoing.

## 2024-06-10 NOTE — PROGRESS NOTES
Pharmacokinetic Initial Assessment: IV Vancomycin    Assessment/Plan:    Initiate intravenous vancomycin with loading dose of 1500 mg once with subsequent doses when random concentrations are less than 20 mcg/mL  Desired empiric serum trough concentration is 10 to 20 mcg/mL  Draw vancomycin random level on 6/11 at 0430 with AM labs.  Pharmacy will continue to follow and monitor vancomycin.      Please contact pharmacy at extension [phone] with any questions regarding this assessment.     Thank you for the consult,   Allyson Olson       Patient brief summary:  Sarah Saravia is a 53 y.o. female initiated on antimicrobial therapy with IV Vancomycin for treatment of suspected sepsis    Drug Allergies:   Review of patient's allergies indicates:  No Known Allergies    Actual Body Weight:   105.5 kg    Renal Function:   Estimated Creatinine Clearance: 13.1 mL/min (A) (based on SCr of 6.2 mg/dL (H)).,     Dialysis Method (if applicable):  intermittent HD, last HD 6/10 AM    CBC (last 72 hours):  Recent Labs   Lab Result Units 06/08/24  0753 06/09/24  0901   WBC K/uL 9.65 9.57   Hemoglobin g/dL 10.6* 10.2*   Hematocrit % 34.8* 34.8*   Platelets K/uL 288 323   Gran % % 62.2 59.2   Lymph % % 22.3 24.5   Mono % % 12.4 12.0   Eosinophil % % 2.0 3.3   Basophil % % 0.6 0.5   Differential Method  Automated Automated       Metabolic Panel (last 72 hours):  Recent Labs   Lab Result Units 06/08/24  0753 06/09/24  0901 06/09/24  1147 06/09/24  1955 06/10/24  0006   Sodium mmol/L 128* 127* 124* 128* 129*   Potassium mmol/L 5.2* 6.3* 6.2*  6.2* 6.5* 6.1*  6.1*   Chloride mmol/L 94* 92* 88* 93* 89*   CO2 mmol/L 21* 19* 21* 20* 22*   Glucose mg/dL 222* 192* 378* 179* 200*   BUN mg/dL 35* 57* 60* 48* 46*   Creatinine mg/dL 5.6* 7.2* 7.2* 6.0* 6.2*   Albumin g/dL 3.5 3.4*  --  3.4*  --    Total Bilirubin mg/dL 0.3 0.3  --  0.4  --    Alkaline Phosphatase U/L 105 103  --  108  --    AST U/L 22 23  --  24  --    ALT U/L 36 31  --  30  --   "      Drug levels (last 3 results):  No results for input(s): "VANCOMYCINRA", "VANCORANDOM", "VANCOMYCINPE", "VANCOPEAK", "VANCOMYCINTR", "VANCOTROUGH" in the last 72 hours.    Microbiologic Results:  Microbiology Results (last 7 days)       Procedure Component Value Units Date/Time    Blood culture [[id number]] Collected: 06/10/24 0719    Order Status: Sent Specimen: Blood Updated: 06/10/24 0731    Narrative:      Lft forearm    Blood culture [[id number]] Collected: 06/10/24 0719    Order Status: Sent Specimen: Blood Updated: 06/10/24 0731    Narrative:      Lft hand            "

## 2024-06-10 NOTE — PT/OT/SLP PROGRESS
Occupational Therapy   Treatment    Name: Sarah Saravia  MRN: [id number]  Admitting Diagnosis:  Acute cystitis with hematuria     Tech assist: Severino  Recommendations:     Discharge Recommendations: Moderate Intensity Therapy  Discharge Equipment Recommendations:  hospital bed, lift device, wheelchair  Barriers to discharge:  Other (Comment)    Assessment:     Sarah Saravia is a 53 y.o. female with a medical diagnosis of Acute cystitis with hematuria.  She presents with deficits in self-care tasks, mobility, cognition and endurance. Pt. Continues to require significant assist for mobility. Pt. Is able to sit EOB with SBA and engage in functional tasks. Requires Patient would benefit from continued OT services to maximize level of safety and independence with self-care tasks.   . Performance deficits affecting function are weakness, impaired endurance, impaired self care skills, impaired functional mobility, impaired cognition, decreased coordination, decreased lower extremity function, decreased safety awareness, decreased upper extremity function, decreased ROM.     Rehab Prognosis:  Good; patient would benefit from acute skilled OT services to address these deficits and reach maximum level of function.       Plan:     Patient to be seen 3 x/week to address the above listed problems via self-care/home management, therapeutic activities, therapeutic exercises, neuromuscular re-education, cognitive retraining  Plan of Care Expires: 06/13/24  Plan of Care Reviewed with: patient (requires significant assist)    Subjective     Chief Complaint: Pt. Non verbal but will communicate some with gestures  Patient/Family Comments/goals: to get stronger  Pain/Comfort:  Pain Rating 1: 0/10  Pain Rating Post-Intervention 1: 0/10    Objective:     Communicated with: nurse prior to session.  Patient found supine with PEG Tube upon OT entry to room.    General Precautions: Standard, aphasia, aspiration, fall, NPO    Orthopedic  Precautions:N/A  Braces: N/A  Respiratory Status: Room air     Occupational Performance:     Bed Mobility:    Patient completed Rolling/Turning to Left with  total assistance and 2 persons  Patient completed Scooting/Bridging with total assistance and 2 persons  Patient completed Supine to Sit with total assistance and 2 persons  Patient completed Sit to Supine with total assistance and 2 persons     Functional Mobility/Transfers:  Patient completed Sit <> Stand Transfer with total assistance and of 2 persons  with  no assistive device x 3 trials from EOB  Functional Mobility: Scoot along EOB to HOB x 1 scoot with Total A x 2     Activities of Daily Living:  Grooming: moderate assistance to wash face seated EOB on this date for task completion ; able to don lip balm with SBA      [facility] 6 Click ADL: 11    Treatment & Education:  Pt. Engaged in task of wiping/drying  table with SBA on this date and good use of BUE noted  Pt. Kicked balloon with left foot x 3 trials but 0/3 with right foot  Pt. Tapped balloon with BUE but only 2 x and then did not engage in task  Pt. Scheduled to have a CT this pm therefore not transferred to [facility]    Patient left supine with HOB elevated and all lines intact, call button in reach, nurse notified, and family present    GOALS:   Multidisciplinary Problems       Occupational Therapy Goals          Problem: Occupational Therapy    Goal Priority Disciplines Outcome Interventions   Occupational Therapy Goal     OT, PT/OT Progressing    Description: Goals to be met by: 5/22/24 Goals revised as needed on 05-30 to be met by 06-19:    Patient will increase functional independence with ADLs by performing:    UE Dressing with Maximum Assistance.MET 05-30  Revised: UBD with Min A  Grooming while EOB with Maximum Assistance.Met 05-30  Revised: Grooming seated EOB with CGA  Sitting at edge of bed x5 minutes with Maximum Assistance. - Met 5/22  Revised: Sit EOB x 20 minutes with SBA  MET  06-10-24  Rolling to Bilateral with Moderate Assistance.NOT MET     Supine to sit with Maximum Assistance. NOT MET                           Time Tracking:     OT Date of Treatment: 06/10/24  OT Start Time: 1404  OT Stop Time: 1435  OT Total Time (min): 31 min    Billable Minutes:Self Care/Home Management 10  Therapeutic Activity 21    OT/JOSÉ: OT     Number of JOSÉ visits since last OT visit: 0    6/10/2024

## 2024-06-10 NOTE — PROGRESS NOTES
[facility] - Telemetry Stepdown  Nephrology  Progress Note    Patient Name: Sarah Saravia  MRN: [id number]  Admission Date: 4/10/2024  Hospital Length of Stay: 61 days  Attending Provider: Soco Erickson MD   Primary Care Physician: Deanna, Primary Doctor  Principal Problem:Acute cystitis with hematuria    Subjective:     Interval History:   HD completed overnight due to hyperkalemia. K 6.1, now 4.1. No distress on exam. Oxygenating well on RA. No UF removed with HD. SBP 90-110s.  Na 127.    Review of patient's allergies indicates:  No Known Allergies  Current Facility-Administered Medications   Medication Frequency    acetaminophen oral solution 650 mg Q6H PRN    ascorbic acid (vitamin C) tablet 500 mg BID    aspirin chewable tablet 81 mg Daily    atorvastatin tablet 40 mg Daily    calcium gluconate 1 g in dextrose 5 % in water (D5W) 50 mL IVPB (MB+) Q10 Min PRN    dextrose 10 % infusion Continuous PRN    dextrose 10% bolus 125 mL 125 mL PRN    dextrose 10% bolus 250 mL 250 mL PRN    dextrose 10% bolus 250 mL 250 mL PRN    dextrose 10% bolus 250 mL 250 mL PRN    epoetin merry injection 6,100 Units Every Mon, Wed, Fri    glucagon (human recombinant) injection 1 mg PRN    glucose chewable tablet 16 g PRN    glucose chewable tablet 24 g PRN    heparin (porcine) injection 1,000 Units PRN    heparin (porcine) injection 3,000 Units PRN    heparin (porcine) injection 5,000 Units Q8H    hepatitis B virus vacc.rec(PF) injection 20 mcg vaccine x 1 dose    insulin aspart U-100 pen 0-10 Units QID (AC + HS) PRN    insulin glargine U-100 (Lantus) pen 13 Units QHS    metoprolol tartrate (LOPRESSOR) tablet 25 mg BID    mupirocin 2 % ointment BID    ondansetron 4 mg/5 mL solution 4 mg Q6H PRN    oxyCODONE 5 mg/5 mL solution 5 mg Q4H PRN    pantoprazole suspension 40 mg Daily    piperacillin-tazobactam (ZOSYN) 4.5 g in dextrose 5 % in water (D5W) 100 mL IVPB (MB+) Q12H    polyethylene glycol packet 17 g TID    sodium chloride 0.9% bolus  250 mL 250 mL PRN    sodium chloride 0.9% flush 10 mL Q12H PRN    sodium zirconium cyclosilicate packet 10 g TID    vancomycin - pharmacy to dose pharmacy to manage frequency    vancomycin 1,500 mg in dextrose 5 % (D5W) 250 mL IVPB (Vial-Mate) Once       Objective:     Vital Signs (Most Recent):  Temp: 99.6 °F (37.6 °C) (06/10/24 0759)  Pulse: (!) 116 (06/10/24 0759)  Resp: 20 (06/10/24 0759)  BP: 103/69 (06/10/24 0759)  SpO2: 98 % (06/10/24 0759) Vital Signs (24h Range):  Temp:  [98.1 °F (36.7 °C)-100.5 °F (38.1 °C)] 99.6 °F (37.6 °C)  Pulse:  [] 116  Resp:  [16-24] 20  SpO2:  [97 %-99 %] 98 %  BP: ()/(49-85) 103/69     Weight: 105.5 kg (232 lb 9.4 oz) (06/04/24 1052)  Body mass index is 36.43 kg/m².  Body surface area is 2.23 meters squared.    I/O last 3 completed shifts:  In: 1100 [I.V.:200; Other:700; NG/GT:200]  Out: 1900 [Other:1900]     Physical Exam  Vitals and nursing note reviewed.   HENT:      Head: Normocephalic.   Cardiovascular:      Rate and Rhythm: Normal rate.   Pulmonary:      Effort: Pulmonary effort is normal.   Abdominal:      General: There is no distension.      Tenderness: There is no abdominal tenderness.   Musculoskeletal:         General: No swelling.   Neurological:      Mental Status: She is alert.   Psychiatric:         Mood and Affect: Mood normal.          Significant Labs:  CBC:   Recent Labs   Lab 06/10/24  0719   WBC 14.31*   RBC 3.87*   HGB 9.8*   HCT 33.3*      MCV 86   MCH 25.3*   MCHC 29.4*     CMP:   Recent Labs   Lab 06/09/24  1955 06/10/24  0006 06/10/24  0616   *   < > 177*  177*   CALCIUM 10.2   < > 9.2  9.2   ALBUMIN 3.4*  --  2.9*   PROT 8.6*  --   --    *   < > 127*  127*   K 6.5*   < > 4.1  4.1  4.1   CO2 20*   < > 17*  17*   CL 93*   < > 95  95   BUN 48*   < > 21*  21*   CREATININE 6.0*   < > 3.3*  3.3*   ALKPHOS 108  --   --    ALT 30  --   --    AST 24  --   --    BILITOT 0.4  --   --     < > = values in this interval not  displayed.     All labs within the past 24 hours have been reviewed.     Assessment/Plan:     Renal/  * Acute cystitis with hematuria  - defer to primary     ESRD (end stage renal disease) on dialysis  53 y.o. Black or  Female ESRD-HD M-W-F at [facility] presents to ED on 4/10/2024 with UTI.    Of note, the patient was initiated on RRT in February 2024 after being admitted with ALVARADO 2/2 iATN due to septic shock. Perm cath placed on 2/29/24. She was transferred to [facility] on 3/12. Deemed ESRD at [facility].  Nephrology consulted for inpatient ESRD-HD management    Assessment:   - HD completed this AM. K 4.1. No distress on exam. No further HD at this time.   - Continue to monitor intake and output  - Please avoid gadolinium, fleets, phos-based laxatives, NSAIDs  - Dialysis thrice weekly unless more urgent indications arise. Will evaluate RRT requirements Daily.      Lab Results   Component Value Date    FESATURATED 10 (L) 03/15/2024    FERRITIN 414 (H) 03/15/2024       - Goal in ESRD is Hgb of 10-11. Continue EPO. Hgb 9.8.      Secondary hyperparathyroid of renal origin   - phos 2.6 - no indication for phos binders       Ayaz's gangrene  - Per chart     Endocrine  Hyponatremia  Free water restriction   Will continue to adjust Na with HD         Thank you for your consult. I will follow-up with patient. Please contact us if you have any additional questions.    Shwetha Gregory, POONAM, FNP-C  Nephrology  [facility] - Telemetry Stepdown

## 2024-06-10 NOTE — ASSESSMENT & PLAN NOTE
53 y.o. female with PMHx of Ayaz's with initial I&D/debridement in Jan 2024 and multiple trips to OR for wound vac placement, and trach/peg placement Feb 2024, DM, ESRD on HD who had fever overnight and increase in WBC    - PEG bumper loosened to 7 cm gloria. Drainage looks consistent with tube feeds though, not purulence. Recommend CT A/P to further evaluate position of PEG   - Would hold tube feeds at this time  - Continue local wound care at PEG site  - Remainder of care per primary  - Please call with questions

## 2024-06-10 NOTE — PLAN OF CARE
Call placed to [facility] ([phone]) to check on the status of patient's admission. This CM was informed that they are still waiting patient's daughter Aleks to complete admission paperwork.   A call was then placed to Aleks  ([phone]) and she informed this CM that she would be sending admission paperwork today. Will continue to follow.    Sara Loredo RN  Ext [phone]    oral

## 2024-06-10 NOTE — HPI
Sarah Saravia is a 53 y.o. female with PMHx of Ayaz's with initial I&D/debridement in Jan 2024 and multiple trips to OR for wound vac placement, and trach/peg placement Feb 2024, DM, ESRD on HD who was admitted to hospital medicine. She has had prolonged hospitalization, partially due to issues with placement. She had a fever to 100.4 F overnight and tachycardia 100s to 120s. Her PEG tube has caused some ulceration of the overlying skin. She has had drainage from around her tube site. Her WBC increased to 14.3 (from 9.6) this morning and her lactate is 3.2. General surgery was consulted to evaluate PEG site as source of her infection.

## 2024-06-10 NOTE — SUBJECTIVE & OBJECTIVE
Interval History: Patient developed fever 100.5F with associated tachycardia overnight. Was also found to have K 6.5 from 6.1 despite shifting along with EKG changes. Emergent HD was initiated overnight with improvement in K to 4.1 this morning. Sodium Bicarb was started overnight, discontinued this morning following resolution of hyperK.     New leukocytosis on morning labs. Bcx drawn. CXR unremarkable. Started empirically on Zosyn and Vancomycin. Suspect source of sepsis to be PEG site as has been notably tender to palpation with patient expressing pain and guarding against examination. Sight concerning for purulence vs tube feeding spillage and skin breakdown despite wound care efforts to maintain barriers between PEG and skin with flexifoam. General Surgery consulted this AM regarding PEG.      Objective:     Vital Signs (Most Recent):  Temp: 99.9 °F (37.7 °C) (06/10/24 1135)  Pulse: 102 (06/10/24 1153)  Resp: 20 (06/10/24 1135)  BP: (!) 116/48 (06/10/24 1135)  SpO2: 97 % (06/10/24 1135) Vital Signs (24h Range):  Temp:  [98.1 °F (36.7 °C)-100.5 °F (38.1 °C)] 99.9 °F (37.7 °C)  Pulse:  [] 102  Resp:  [16-24] 20  SpO2:  [97 %-99 %] 97 %  BP: ()/(48-85) 116/48     Weight: 105.5 kg (232 lb 9.4 oz)  Body mass index is 36.43 kg/m².    Intake/Output Summary (Last 24 hours) at 6/10/2024 1246  Last data filed at 6/10/2024 0638  Gross per 24 hour   Intake 900 ml   Output 1900 ml   Net -1000 ml         Physical Exam  Vitals and nursing note reviewed.   Constitutional:       General: She is not in acute distress.     Appearance: She is not ill-appearing, toxic-appearing or diaphoretic.   HENT:      Head: Normocephalic and atraumatic.   Eyes:      General: No scleral icterus.        Right eye: No discharge.         Left eye: No discharge.      Conjunctiva/sclera: Conjunctivae normal.   Neck:      Comments: Decannulated  Cardiovascular:      Rate and Rhythm: Normal rate and regular rhythm.      Heart sounds:  Normal heart sounds.   Pulmonary:      Effort: Pulmonary effort is normal. No respiratory distress.   Abdominal:      General: Abdomen is flat. There is no distension.      Tenderness: There is no abdominal tenderness.      Comments: PEG present     Musculoskeletal:      Right lower leg: No edema.      Left lower leg: No edema.   Skin:     General: Skin is warm and dry.   Neurological:      General: No focal deficit present.      Mental Status: She is alert.      Comments: Nonverbal             Significant Labs: All pertinent labs within the past 24 hours have been reviewed.    Significant Imaging: I have reviewed all pertinent imaging results/findings within the past 24 hours.

## 2024-06-10 NOTE — CARE UPDATE
DO on call, care update.    K 6.5 post dialysis, up from 6.3.   Patient has T wave changes on Tele.   Shifting limited by ESRD /Sepsis Comorbidity  Albuterol ordered earlier in the night; held by RT; patient still tachy. Will order  Insulin ordered  CaGluc ordered d/t EKG changes    Discussed with Nephro on call, patient will get emergency dialysis overnight.

## 2024-06-10 NOTE — SUBJECTIVE & OBJECTIVE
No current facility-administered medications on file prior to encounter.     Current Outpatient Medications on File Prior to Encounter   Medication Sig    ascorbic acid, vitamin C, (VITAMIN C) 500 MG tablet 500 mg by Per G Tube route 2 (two) times daily.    pantoprazole sodium (PANTOPRAZOLE ORAL) 40 mg once daily. 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    Hypermagnesemia 04/11/2024    POA, Mg 3.2  Daily chem       Morbid obesity     Necrotizing fasciitis      Past Surgical History:   Procedure Laterality Date    CLOSURE OF WOUND Right 2/22/2024    Procedure: CLOSURE, WOUND;  Surgeon: Steve Cole MD;  Location: [facility] OR [address] [place] [address];  Service: General;  Laterality: Right;    ESOPHAGOGASTRODUODENOSCOPY W/ PEG N/A 2/22/2024    Procedure: EGD, WITH PEG TUBE INSERTION;  Surgeon: Steve Cole MD;  Location: [facility] OR [address] [place] [address];  Service: General;  Laterality: N/A;    INCISION AND DRAINAGE OF PERIRECTAL REGION N/A 1/29/2024    Procedure: INCISION AND DRAINAGE, PERIRECTAL REGION;  Surgeon: Axel Ramsay MD;  Location: [facility] OR;  Service: General;  Laterality: N/A;    LUMBAR PUNCTURE N/A 2/16/2024    Procedure: Lumbar Puncture;  Surgeon: Macy Boykin;  Location: [facility];  Service: Anesthesiology;  Laterality: N/A;    PLACEMENT, TRIALYSIS CATH Right 1/31/2024    Procedure: INSERTION, CATHETER, TRIPLE LUMEN, HEMODIALYSIS, TEMPORARY;  Surgeon: Alvin Junior MD;  Location: [facility] OR;  Service: General;  Laterality: Right;    REPLACEMENT OF WOUND VACUUM-ASSISTED CLOSURE DEVICE Right 2/12/2024    Procedure: REPLACEMENT, WOUND VAC;  Surgeon: Mundo Carmona MD;  Location: [address];  Service: General;  Laterality: Right;    REPLACEMENT OF WOUND VACUUM-ASSISTED CLOSURE DEVICE N/A 2/15/2024    Procedure: REPLACEMENT, WOUND VAC;  Surgeon: Steve Cole MD;  Location: [facility]  OR 2ND FLR;  Service: General;  Laterality: N/A;    REPLACEMENT OF WOUND VACUUM-ASSISTED CLOSURE DEVICE Right 2/19/2024    Procedure: REPLACEMENT, WOUND VAC;  Surgeon: Steve Cole MD;  [facility];  Service: General;  Laterality: Right;  RLE/groin    REPLACEMENT OF WOUND VACUUM-ASSISTED CLOSURE DEVICE Right 2/22/2024    Procedure: REPLACEMENT, WOUND VAC;  Surgeon: Steve Cole MD;  [facility];  Service: General;  Laterality: Right;    TRACHEOSTOMY N/A 2/22/2024    Procedure: CREATION, TRACHEOSTOMY;  Surgeon: Steve Cole MD;  [facility];  Service: General;  Laterality: N/A;    WOUND DEBRIDEMENT Bilateral 2/2/2024    Procedure: DEBRIDEMENT, WOUND;  Surgeon: Steve Cole MD;  Location: [facility] OR [address] [place] [address];  Service: General;  Laterality: Bilateral;  Bilateral groin  Possible wound vac placement    WOUND DEBRIDEMENT Right 2/6/2024    Procedure: DEBRIDEMENT, WOUND, replace wound vac, possible closure;  Surgeon: Steve Cole MD;  Location: [facility] OR [address] [place] [address];  Service: General;  Laterality: Right;  RLE    WOUND DEBRIDEMENT Right 2/9/2024    Procedure: DEBRIDEMENT, WOUND w wound vac change;  Surgeon: Steve Cole MD;  Location: [facility] OR [address] [place] [address];  Service: General;  Laterality: Right;  RLE    WOUND DEBRIDEMENT Right 2/12/2024    Procedure: R thigh wound debridement;  Surgeon: Mundo Carmona MD;  Location: [address];  Service: General;  Laterality: Right;    WOUND EXPLORATION Right 1/31/2024    Procedure: IRRIGATION & DEBRIDEMENT, WOUND DEBRIDEMENT;  Surgeon: Alvin Junior MD;  Location: [facility];  Service: General;  Laterality: Right;     Family History    None       Tobacco Use    Smoking status: Unknown    Smokeless tobacco: Not on file   Substance and Sexual Activity    Alcohol use: Not on file    Drug use: Not on file    Sexual activity: Not on file     Review of Systems   Unable to perform ROS: Patient nonverbal     Objective:     Vital Signs (Most  Recent):  Temp: 99.9 °F (37.7 °C) (06/10/24 1135)  Pulse: 102 (06/10/24 1153)  Resp: 20 (06/10/24 1135)  BP: (!) 116/48 (06/10/24 1135)  SpO2: 97 % (06/10/24 1135) Vital Signs (24h Range):  Temp:  [98.9 °F (37.2 °C)-100.5 °F (38.1 °C)] 99.9 °F (37.7 °C)  Pulse:  [] 102  Resp:  [17-24] 20  SpO2:  [97 %-99 %] 97 %  BP: ()/(48-85) 116/48     Weight: 105.5 kg (232 lb 9.4 oz)  Body mass index is 36.43 kg/m².     Physical Exam  Vitals reviewed.   Constitutional:       General: She is not in acute distress.     Appearance: She is well-developed.   HENT:      Head: Normocephalic and atraumatic.   Eyes:      General:         Right eye: No discharge.         Left eye: No discharge.      Conjunctiva/sclera: Conjunctivae normal.   Cardiovascular:      Rate and Rhythm: Normal rate and regular rhythm.   Pulmonary:      Effort: Pulmonary effort is normal. No respiratory distress.   Abdominal:      General: There is no distension.      Palpations: Abdomen is soft.      Tenderness: There is no abdominal tenderness.      Comments: PEG site with bumper at 6 cm, ulceration of skin underlying bumper though the wound is healthy appearing  Some drainage around tube   Musculoskeletal:         General: No deformity.   Skin:     General: Skin is warm and dry.   Neurological:      Mental Status: She is alert and oriented to person, place, and time.   Psychiatric:         Behavior: Behavior normal.            I have reviewed all pertinent lab results within the past 24 hours.  CBC:   Recent Labs   Lab 06/10/24  0719   WBC 14.31*   RBC 3.87*   HGB 9.8*   HCT 33.3*      MCV 86   MCH 25.3*   MCHC 29.4*     CMP:   Recent Labs   Lab 06/09/24  1955 06/10/24  0006 06/10/24  1114   *   < > 236*   CALCIUM 10.2   < > 9.4   ALBUMIN 3.4*   < > 3.0*   PROT 8.6*  --   --    *   < > 128*   K 6.5*   < > 4.7   CO2 20*   < > 22*   CL 93*   < > 92*   BUN 48*   < > 22*   CREATININE 6.0*   < > 3.6*   ALKPHOS 108  --   --    ALT 30   --   --    AST 24  --   --    BILITOT 0.4  --   --     < > = values in this interval not displayed.       Significant Diagnostics:  I have reviewed all pertinent imaging results/findings within the past 24 hours.

## 2024-06-10 NOTE — NURSING
Nurses Note -- 4 Eyes      6/10/2024   10:03 AM      Skin assessed during: Q Shift Change      [] No Altered Skin Integrity Present    []Prevention Measures Documented      [x] Yes- Altered Skin Integrity Present or Discovered   [] LDA Added if Not in Epic (Describe Wound)   [x] New Altered Skin Integrity was Present on Admit and Documented in LDA   [] Wound Image Taken    Wound Care Consulted? No    Attending Nurse:  ELISABETH Andrews    Second RN/Staff Member:  ELISABETH Nuñez

## 2024-06-11 LAB
ALBUMIN SERPL BCP-MCNC: 2.8 G/DL (ref 3.5–5.2)
ALP SERPL-CCNC: 70 U/L (ref 55–135)
ALT SERPL W/O P-5'-P-CCNC: 30 U/L (ref 10–44)
ANION GAP SERPL CALC-SCNC: 14 MMOL/L (ref 8–16)
AST SERPL-CCNC: 29 U/L (ref 10–40)
BASOPHILS # BLD AUTO: 0.05 K/UL (ref 0–0.2)
BASOPHILS NFR BLD: 0.7 % (ref 0–1.9)
BILIRUB SERPL-MCNC: 0.3 MG/DL (ref 0.1–1)
BUN SERPL-MCNC: 29 MG/DL (ref 6–20)
CALCIUM SERPL-MCNC: 9.6 MG/DL (ref 8.7–10.5)
CHLORIDE SERPL-SCNC: 93 MMOL/L (ref 95–110)
CO2 SERPL-SCNC: 23 MMOL/L (ref 23–29)
CREAT SERPL-MCNC: 4.9 MG/DL (ref 0.5–1.4)
DIFFERENTIAL METHOD BLD: ABNORMAL
EOSINOPHIL # BLD AUTO: 0.4 K/UL (ref 0–0.5)
EOSINOPHIL NFR BLD: 5.6 % (ref 0–8)
ERYTHROCYTE [DISTWIDTH] IN BLOOD BY AUTOMATED COUNT: 15.8 % (ref 11.5–14.5)
EST. GFR  (NO RACE VARIABLE): 10 ML/MIN/1.73 M^2
GLUCOSE SERPL-MCNC: 110 MG/DL (ref 70–110)
HCT VFR BLD AUTO: 27.6 % (ref 37–48.5)
HGB BLD-MCNC: 8.5 G/DL (ref 12–16)
IMM GRANULOCYTES # BLD AUTO: 0.02 K/UL (ref 0–0.04)
IMM GRANULOCYTES NFR BLD AUTO: 0.3 % (ref 0–0.5)
LYMPHOCYTES # BLD AUTO: 1.2 K/UL (ref 1–4.8)
LYMPHOCYTES NFR BLD: 18.2 % (ref 18–48)
MAGNESIUM SERPL-MCNC: 2 MG/DL (ref 1.6–2.6)
MCH RBC QN AUTO: 25.6 PG (ref 27–31)
MCHC RBC AUTO-ENTMCNC: 30.8 G/DL (ref 32–36)
MCV RBC AUTO: 83 FL (ref 82–98)
MONOCYTES # BLD AUTO: 1.3 K/UL (ref 0.3–1)
MONOCYTES NFR BLD: 19.7 % (ref 4–15)
NEUTROPHILS # BLD AUTO: 3.8 K/UL (ref 1.8–7.7)
NEUTROPHILS NFR BLD: 55.5 % (ref 38–73)
NRBC BLD-RTO: 0 /100 WBC
PHOSPHATE SERPL-MCNC: 4.2 MG/DL (ref 2.7–4.5)
PLATELET # BLD AUTO: 198 K/UL (ref 150–450)
PMV BLD AUTO: 10.3 FL (ref 9.2–12.9)
POCT GLUCOSE: 116 MG/DL (ref 70–110)
POCT GLUCOSE: 118 MG/DL (ref 70–110)
POCT GLUCOSE: 118 MG/DL (ref 70–110)
POCT GLUCOSE: 134 MG/DL (ref 70–110)
POTASSIUM SERPL-SCNC: 4.1 MMOL/L (ref 3.5–5.1)
POTASSIUM SERPL-SCNC: 4.1 MMOL/L (ref 3.5–5.1)
POTASSIUM SERPL-SCNC: 4.3 MMOL/L (ref 3.5–5.1)
POTASSIUM SERPL-SCNC: 4.6 MMOL/L (ref 3.5–5.1)
PROT SERPL-MCNC: 7.3 G/DL (ref 6–8.4)
RBC # BLD AUTO: 3.32 M/UL (ref 4–5.4)
SODIUM SERPL-SCNC: 130 MMOL/L (ref 136–145)
VANCOMYCIN SERPL-MCNC: 20.8 UG/ML
WBC # BLD AUTO: 6.81 K/UL (ref 3.9–12.7)

## 2024-06-11 PROCEDURE — 25000003 PHARM REV CODE 250

## 2024-06-11 PROCEDURE — 63600175 PHARM REV CODE 636 W HCPCS

## 2024-06-11 PROCEDURE — 92526 ORAL FUNCTION THERAPY: CPT

## 2024-06-11 PROCEDURE — 80053 COMPREHEN METABOLIC PANEL: CPT | Performed by: STUDENT IN AN ORGANIZED HEALTH CARE EDUCATION/TRAINING PROGRAM

## 2024-06-11 PROCEDURE — 27000207 HC ISOLATION

## 2024-06-11 PROCEDURE — 83735 ASSAY OF MAGNESIUM: CPT | Performed by: STUDENT IN AN ORGANIZED HEALTH CARE EDUCATION/TRAINING PROGRAM

## 2024-06-11 PROCEDURE — 36415 COLL VENOUS BLD VENIPUNCTURE: CPT | Mod: XB

## 2024-06-11 PROCEDURE — 84100 ASSAY OF PHOSPHORUS: CPT | Performed by: STUDENT IN AN ORGANIZED HEALTH CARE EDUCATION/TRAINING PROGRAM

## 2024-06-11 PROCEDURE — 85025 COMPLETE CBC W/AUTO DIFF WBC: CPT | Performed by: STUDENT IN AN ORGANIZED HEALTH CARE EDUCATION/TRAINING PROGRAM

## 2024-06-11 PROCEDURE — 84132 ASSAY OF SERUM POTASSIUM: CPT | Mod: 91

## 2024-06-11 PROCEDURE — 97530 THERAPEUTIC ACTIVITIES: CPT | Mod: CQ

## 2024-06-11 PROCEDURE — 80202 ASSAY OF VANCOMYCIN: CPT | Performed by: STUDENT IN AN ORGANIZED HEALTH CARE EDUCATION/TRAINING PROGRAM

## 2024-06-11 PROCEDURE — 63600175 PHARM REV CODE 636 W HCPCS: Performed by: STUDENT IN AN ORGANIZED HEALTH CARE EDUCATION/TRAINING PROGRAM

## 2024-06-11 PROCEDURE — 36415 COLL VENOUS BLD VENIPUNCTURE: CPT | Performed by: STUDENT IN AN ORGANIZED HEALTH CARE EDUCATION/TRAINING PROGRAM

## 2024-06-11 PROCEDURE — 20600001 HC STEP DOWN PRIVATE ROOM

## 2024-06-11 PROCEDURE — 99232 SBSQ HOSP IP/OBS MODERATE 35: CPT | Mod: ,,, | Performed by: NURSE PRACTITIONER

## 2024-06-11 RX ORDER — SODIUM CHLORIDE 9 MG/ML
INJECTION, SOLUTION INTRAVENOUS ONCE
Status: DISCONTINUED | OUTPATIENT
Start: 2024-06-12 | End: 2024-06-14

## 2024-06-11 RX ADMIN — HEPARIN SODIUM 5000 UNITS: 5000 INJECTION INTRAVENOUS; SUBCUTANEOUS at 09:06

## 2024-06-11 RX ADMIN — MUPIROCIN: 20 OINTMENT TOPICAL at 09:06

## 2024-06-11 RX ADMIN — SODIUM ZIRCONIUM CYCLOSILICATE 5 G: 5 POWDER, FOR SUSPENSION ORAL at 03:06

## 2024-06-11 RX ADMIN — SODIUM ZIRCONIUM CYCLOSILICATE 5 G: 5 POWDER, FOR SUSPENSION ORAL at 09:06

## 2024-06-11 RX ADMIN — ATORVASTATIN CALCIUM 40 MG: 40 TABLET, FILM COATED ORAL at 09:06

## 2024-06-11 RX ADMIN — METOPROLOL TARTRATE 25 MG: 25 TABLET, FILM COATED ORAL at 09:06

## 2024-06-11 RX ADMIN — POLYETHYLENE GLYCOL 3350 17 G: 17 POWDER, FOR SOLUTION ORAL at 09:06

## 2024-06-11 RX ADMIN — ACETAMINOPHEN 650 MG: 650 SOLUTION ORAL at 09:06

## 2024-06-11 RX ADMIN — Medication 500 MG: at 09:06

## 2024-06-11 RX ADMIN — HEPARIN SODIUM 5000 UNITS: 5000 INJECTION INTRAVENOUS; SUBCUTANEOUS at 05:06

## 2024-06-11 RX ADMIN — PIPERACILLIN SODIUM AND TAZOBACTAM SODIUM 4.5 G: 4; .5 INJECTION, POWDER, FOR SOLUTION INTRAVENOUS at 11:06

## 2024-06-11 RX ADMIN — PANTOPRAZOLE SODIUM 40 MG: 40 GRANULE, DELAYED RELEASE ORAL at 09:06

## 2024-06-11 RX ADMIN — POLYETHYLENE GLYCOL 3350 17 G: 17 POWDER, FOR SOLUTION ORAL at 03:06

## 2024-06-11 RX ADMIN — HEPARIN SODIUM 5000 UNITS: 5000 INJECTION INTRAVENOUS; SUBCUTANEOUS at 03:06

## 2024-06-11 RX ADMIN — INSULIN GLARGINE 13 UNITS: 100 INJECTION, SOLUTION SUBCUTANEOUS at 09:06

## 2024-06-11 NOTE — PROGRESS NOTES
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 History was given from patient's daughter. She was undergoing dialysis today and noticed she was lethargic, less alert than usual self. Pt completed dialysis and still not acting herself. EMS was called, fever of a 100.  On chart review, she did have an episode of large volume emesis around 1700.  Per EMS, she had a slightly elevated temp 100.0°F, glucose 300s.     In the ED: UA 2+ leuks, >100 WBC, many bacteria, WBC 17, CT abd/pelvis concerning for cystitis. Given vanc/zosyn    Overview/Hospital Course:  Patient was admitted to Hospital Medicine service for medical management and evaluation of urosepsis. Patient was continued on vanc/zosyn. Vascular Neurology consulted concerning mental status change with the recommendation to initiate ASA 81 QD and to obtain an MRI to r/o new stroke. Imaging pending. Nephrology was consulted for regularly scheduled dialysis. Afternoon of 4/12, patient was unable to tolerate filtration of volume during dialsis 2/2 hypotension. Patient received 500cc bolus and was transferred back to floor after finishing the session without volume removed. Will monitor for signs of volume overload. Tailoring insulin regimen while uptitrating tube feeds to goal rate. Staph Epi in all 4 bottles; ID with recommendation to continue Vanc & de-escalate meropenem to ertapenem on 04/15 through 4/16. Patient completed IV course of UTI coverage. Patient tolerated volume removal well in dialysis  throughout the rest of her hospital stay. Pending discharge to LTACH pending bed availability. Patient without BM with one episode of vomiting overnight 4/17. KUB with bowel gas and low concern for obstruction with no transition point noted. Escalating bowel regimen. Pending placement as of 4/22. Pulm consult placed to evaluate for possible trach downsize & eventual decannulation to assist placement if safe. Trach capping trial per pulm for 48h and will assess for decannulation on Monday. DVT studies negative. Pulm successfully decannulated pt 4/30, IR exchanged TDC. Pending swallow study with SLP and waiting for dispo options. Pt failed swallow study, placement pending. Working with PT on mobilize pt to sitting in a chair to expand placement options. Pt successfully mobilized using sandee lift but required 2 people to do so. Discussing dispo plan with family, likely d/c home if HD, DME needs able to be met. Pending acceptance at outpatient HD center. Ongoing placement difficulties d/t need for accepting HD facility to have sandee lift with 2 personnel to operate. Family meeting to discuss disposition options planned for Thursday 5/23 at 1 PM. KARI onboard for placement to [facility] to continue dialysis. Once patient gets accepted at [facility] [place], next step will be to work with daughter on halfway NH placement. Hyperkalamic, given lokelma x1. Hyperkalemic, shifted K with lokelma x 1 and regular insulin. Nephrology to take on dyalisis today. Optimizing insulin BG and insulin management. SW onboard working on paperwork for long term Medicaid application. CT abdomen with correctly placed PEG tube. Re-started TF and ASA. D/C vanc and will plan on discontinuing zosyn on 6/12. Pending NH placement.       Interval History: no new fevers. PEG in place. CT abdomen without PEG misplacement. Will discontinue vanc and restart TF with monitoring on BG.     Review of Systems   Unable to perform ROS: Patient nonverbal      Objective:     Vital Signs (Most Recent):  Temp: 98.6 °F (37 °C) (06/11/24 1116)  Pulse: 91 (06/11/24 1116)  Resp: 18 (06/11/24 1116)  BP: 119/65 (06/11/24 1116)  SpO2: 97 % (06/11/24 1116) Vital Signs (24h Range):  Temp:  [98.3 °F (36.8 °C)-100.3 °F (37.9 °C)] 98.6 °F (37 °C)  Pulse:  [] 91  Resp:  [17-20] 18  SpO2:  [95 %-99 %] 97 %  BP: (113-119)/(60-77) 119/65     Weight: 105.5 kg (232 lb 9.4 oz)  Body mass index is 36.43 kg/m².    Intake/Output Summary (Last 24 hours) at 6/11/2024 1428  Last data filed at 6/11/2024 0532  Gross per 24 hour   Intake 350 ml   Output --   Net 350 ml         Physical Exam  Vitals and nursing note reviewed.   Constitutional:       General: She is not in acute distress.     Appearance: She is not ill-appearing, toxic-appearing or diaphoretic.   HENT:      Head: Normocephalic and atraumatic.   Eyes:      General: No scleral icterus.        Right eye: No discharge.         Left eye: No discharge.      Conjunctiva/sclera: Conjunctivae normal.   Neck:      Comments: Decannulated  Cardiovascular:      Rate and Rhythm: Normal rate and regular rhythm.      Heart sounds: Normal heart sounds.   Pulmonary:      Effort: Pulmonary effort is normal. No respiratory distress.   Abdominal:      General: Abdomen is flat. There is no distension.      Tenderness: There is no abdominal tenderness.      Comments: PEG in place.      Musculoskeletal:      Right lower leg: No edema.      Left lower leg: No edema.   Skin:     General: Skin is warm and dry.   Neurological:      General: No focal deficit present.      Mental Status: She is alert.      Comments: Nonverbal             Significant Labs: All pertinent labs within the past 24 hours have been reviewed.    Significant Imaging: I have reviewed all pertinent imaging results/findings within the past 24 hours.    Assessment/Plan:      * Acute cystitis with hematuria  Resolved       Vulvovaginal candidiasis  Noted 5/29, given 150mg fluconazole  x1      Irritant contact dermatitis due friction or contact with other specified body fluids  Wound care following       Debility  Needs:  Wheelchair: patient has a mobility limitation that significantly impairs her ability to participate in one or more mobility related activities of daily living (MRADL's) such as toileting, feeding, dressing, grooming, and bathing in customary locations in the home.  The mobility limitation cannot be sufficiently resolved by the use of a cane or walker.   The use of a manual wheelchair will significantly improve the patient's ability to participate in MRADLS and the patient will use it on regular basis in the home.  Family/pt has expressed her willingness to use a manual wheelchair in the home.  She also has a caregiver who is capable of assisting in mobility.    Mrs Saravia requires a hospital bed due to her requiring positioning of the body in ways not feasible with an ordinary bed to alleviate pain/ is completely immobile /or limited mobility and cannot independently make changes in body position without the use of the bed.  The positioning of the body cannot be sufficiently resolved by the use of pillows and wedges    Patient has a mobility limitation that significantly impairs their ability to participate in one or more mobility related activities of daily living, including toileting. This deficit can be resolved by using a bedside commode. Patient demonstrates mobility limitations that will cause them to be confined to one room at home without bathroom access for up to 30 days. Using a bedside commode will greatly improve the patient's ability to participate in MRADLs       Complication of vascular dialysis catheter  Cath intermittently clogging, not resolving with cath-hedy, going for IR venogram 4/30 with possible exchange. S/p exchange with IR on 4/30      Constipation  Resolved     Moderate malnutrition  Nutrition consulted. Most recent weight and BMI monitored-      Measurements:  Wt Readings from Last 1 Encounters:   06/11/24 105.5 kg (232 lb 9.4 oz)   Body mass index is 36.43 kg/m².    Patient has been screened and assessed by RD.    Malnutrition Type:  Context: acute illness or injury  Level: moderate    Malnutrition Characteristic Summary:  Weight Loss (Malnutrition): 10% in 6 months  Subcutaneous Fat (Malnutrition): mild depletion  Muscle Mass (Malnutrition): mild depletion  Fluid Accumulation (Malnutrition): mild    Interventions/Recommendations (treatment strategy):  1.  - restarted TF  - previous history of failed swallow studies    Prolonged QT interval  Qtc 526 [04/10/24]      limit Qtc prolonging drugs as able    Spastic hemiplegia of right dominant side as late effect of cerebrovascular disease  Important that patient is able to sit in chair for 4h for dialysis placement needs, even if she requires lift/assistance getting into the chair     This patient has Chronic right hemiplegia due to stroke. Physical therapy services has been scheduled. Continue all standard measures for pressure injury prevention and consult wound care for any wounds (chronic or acute).    ESRD (end stage renal disease) on dialysis  Creatine stable for now. BMP reviewed- noted Estimated Creatinine Clearance: 16.6 mL/min (A) (based on SCr of 4.9 mg/dL (H)). according to latest data. Based on current GFR, CKD stage is end stage.  Monitor UOP and serial BMP and adjust therapy as needed. Renally dose meds. Avoid nephrotoxic medications and procedures.    SW onboard for placement to [facility] to continue dialysis. Once patient gets accepted at [facility], next step will be to work with daughter on [facility] NH placement.     -- HD MWF (~1L fluid removal on average per session)  -- nephro following    PEG (percutaneous endoscopic gastrostomy) status  On PEG tube.Wound care with PEG dressing changes.   - CT abdomen with PEG in place.  - will restart TF         Status post  tracheostomy  Resolved, decannulated 4/30    Acute encephalopathy  Resolved.     Type 2 diabetes mellitus with hyperglycemia, with long-term current use of insulin  Adjusting insulin to account for diabetic TF    Patient's FSGs are controlled on current medication regimen.  Last A1c reviewed-   Lab Results   Component Value Date    HGBA1C 6.2 (H) 05/27/2024     Most recent fingerstick glucose reviewed-   Recent Labs   Lab 06/10/24  1556 06/10/24  2145 06/11/24  0806 06/11/24  1250   POCTGLUCOSE 209* 166* 118* 134*       Current correctional scale  Medium  Maintain anti-hyperglycemic dose as follows-   Antihyperglycemics (From admission, onward)      Start     Stop Route Frequency Ordered    06/09/24 2100  insulin glargine U-100 (Lantus) pen 13 Units         -- SubQ Nightly 06/09/24 0756    06/09/24 1255  insulin aspart U-100 pen 0-10 Units         -- SubQ Before meals & nightly PRN 06/09/24 1155          Hold Oral hypoglycemics while patient is in the hospital.  POCT glucose checks   -Restart tube feeds     Hyponatremia  Patient has hyponatremia which is uncontrolled,We will aim to correct the sodium by 4-6mEq in 24 hours. We will monitor sodium Daily. The hyponatremia is due to ESRD. Discussed with nephrology, dialysate bath adjusted to account for and correct hyponatremia.  Recent Labs   Lab 06/11/24  0519   *       - Daily morning CMP  - Continue to montior    Sepsis  This patient does have evidence of infective focus  My overall impression is sepsis.  Source: Skin and Soft Tissue (location Abdominal)  Antibiotics given-   Antibiotics (72h ago, onward)      Start     Stop Route Frequency Ordered    06/10/24 0730  piperacillin-tazobactam (ZOSYN) 4.5 g in dextrose 5 % in water (D5W) 100 mL IVPB (MB+)         -- IV Every 12 hours (non-standard times) 06/10/24 0628    06/09/24 0915  mupirocin 2 % ointment         06/14/24 0859 Nasl 2 times daily 06/09/24 0805          Latest lactate reviewed-  Recent Labs   Lab  06/10/24  1114   LACTATE 3.2*       Organ dysfunction indicated by Encephalopathy    Fluid challenge Contraindicated- Fluid bolus is contraindicated in this patient due to End Stage Renal Disease     Post- resuscitation assessment No - Post resuscitation assessment not needed       Will Not start Pressors- Levophed for MAP of 65  Source control achieved by: Zosyn     Plan:  - Concerns septic source may be PEG site with associated purulent wound despite Wound Care attempts to protect with barriers. PEG placed by General Surgery 2/2024. 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 Pt underwent extensive course, currently still healing from this, but no longer has wound vac. Pt's wound currently covered with bandage. Picture in media tabs    Plan  Wound care        VTE Risk Mitigation (From admission, onward)           Ordered     heparin (porcine) injection 1,000 Units  As needed (PRN)         06/10/24 0035     heparin (porcine) injection 5,000 Units  Every 8 hours         05/29/24 0823     heparin (porcine) injection 3,000 Units  As needed (PRN)         04/17/24 0825                    Discharge Planning   ZEKE: 6/12/2024     Code Status: Full Code   Is the patient medically ready for discharge?: No    Reason for patient still in hospital (select all that apply): Patient trending condition  Discharge Plan A: New Nursing Home placement - alf care facility            Steven Miranda MD  Department of Hospital Medicine   Woody melecio - Telemetry Stepdown

## 2024-06-11 NOTE — PT/OT/SLP PROGRESS
Speech Language Pathology Treatment    Patient Name:  Sarah Saravia   MRN:  [id number]  Admitting Diagnosis: Acute cystitis with hematuria    Recommendations:                 General Recommendations:  Dysphagia therapy and Speech/language therapy  Diet recommendations:  NPO, Liquid Diet Level: NPO ok for sips of thin liquids for pleasure  Aspiration Precautions: Continue alternate means of nutrition, HOB to 90 degrees, Monitor for s/s of aspiration, and Strict aspiration precautions   General Precautions: Standard, aphasia, aspiration, fall, NPO  Communication strategies:  yes/no questions only and go to room if call light pushed    Assessment:     Sarah Saravia is a 53 y.o. female with an SLP diagnosis of Aphasia, Dysphagia, and Cognitive-Linguistic Impairment.     Subjective     Pt awake/alert, but remained nonverbal.     Pain/Comfort:  Pain Rating 1: 0/10    Respiratory Status: Room air    Objective:     Has the patient been evaluated by SLP for swallowing?   Yes  Keep patient NPO? Yes   Current Respiratory Status:        Nurse gave OK for SLP to work with pt and present PO trials, but warned that pt did experience vomiting earlier today.  Pt agreed to accepting sips of water, but trials were limited out of caution due to recent bout of vomiting.  Pt accepted straw sips of water x 3.  No overt s/s of aspiration were observed after limited PO trials.  Cont POC.     Goals:   Multidisciplinary Problems       SLP Goals          Problem: SLP    Goal Priority Disciplines Outcome   SLP Goal     SLP    Description: Speech Language Pathology Goals  Updated goals expected to be met by 5/10 (goals remain appropriate 6/11 - reassess on 6/18:  1. Pt will participate in ongoing swallowing assessment to determine if appropriate for PO trials for pleasure.   2. Pt will model single step command x 1 given max cues.   3. Pt will answer simple yes/no q's during a structured receptive language task x 1 given max cues.   4. Pt  will phonate purposefully upon command x 1 given max cues.   5. Pt will attempt to vocalize/verbalize during automatic speech task x 1 given max cues.   6. Pt will participate in evaluation of ability to utilize basic communication board.     Goals expected to be met by 5/8:  1. Pt will participate in Modified Barium Swallow Study to determine if safe for oral intake. Goal met/attempted 5/2                               Plan:     Patient to be seen:  3 x/week   Plan of Care expires:  05/31/24  Plan of Care reviewed with:  patient   SLP Follow-Up:  Yes       Discharge recommendations:  Moderate Intensity Therapy     Time Tracking:     SLP Treatment Date:   06/11/24  Speech Start Time:  1213  Speech Stop Time:  1220     Speech Total Time (min):  7 min    Billable Minutes: Treatment Swallowing Dysfunction 7    06/11/2024

## 2024-06-11 NOTE — PLAN OF CARE
Feeding was discontinued to further evaluate the PEG tube placement. Abdominal CT scan completed earlier in the night. MD on duty notified. Full results and evaluations still unavailable. Safety measures in place. The call light is within reach. Pt care ongoing.     Problem: Infection  Goal: Absence of Infection Signs and Symptoms  Intervention: Prevent or Manage Infection  Flowsheets (Taken 6/11/2024 0442)  Isolation Precautions:   precautions maintained   contact  Problem: Skin Injury Risk Increased  Goal: Skin Health and Integrity  Intervention: Optimize Skin Protection  Flowsheets (Taken 6/11/2024 0442)  Activity Management:   Arm raise - L1   Rolling - L1  Head of Bed (HOB) Positioning: HOB elevated

## 2024-06-11 NOTE — SUBJECTIVE & OBJECTIVE
Interval History: no new fevers. PEG in place. CT abdomen without PEG misplacement. Will discontinue vanc and restart TF with monitoring on BG.     Review of Systems   Unable to perform ROS: Patient nonverbal     Objective:     Vital Signs (Most Recent):  Temp: 98.6 °F (37 °C) (06/11/24 1116)  Pulse: 91 (06/11/24 1116)  Resp: 18 (06/11/24 1116)  BP: 119/65 (06/11/24 1116)  SpO2: 97 % (06/11/24 1116) Vital Signs (24h Range):  Temp:  [98.3 °F (36.8 °C)-100.3 °F (37.9 °C)] 98.6 °F (37 °C)  Pulse:  [] 91  Resp:  [17-20] 18  SpO2:  [95 %-99 %] 97 %  BP: (113-119)/(60-77) 119/65     Weight: 105.5 kg (232 lb 9.4 oz)  Body mass index is 36.43 kg/m².    Intake/Output Summary (Last 24 hours) at 6/11/2024 1428  Last data filed at 6/11/2024 0532  Gross per 24 hour   Intake 350 ml   Output --   Net 350 ml         Physical Exam  Vitals and nursing note reviewed.   Constitutional:       General: She is not in acute distress.     Appearance: She is not ill-appearing, toxic-appearing or diaphoretic.   HENT:      Head: Normocephalic and atraumatic.   Eyes:      General: No scleral icterus.        Right eye: No discharge.         Left eye: No discharge.      Conjunctiva/sclera: Conjunctivae normal.   Neck:      Comments: Decannulated  Cardiovascular:      Rate and Rhythm: Normal rate and regular rhythm.      Heart sounds: Normal heart sounds.   Pulmonary:      Effort: Pulmonary effort is normal. No respiratory distress.   Abdominal:      General: Abdomen is flat. There is no distension.      Tenderness: There is no abdominal tenderness.      Comments: PEG in place.      Musculoskeletal:      Right lower leg: No edema.      Left lower leg: No edema.   Skin:     General: Skin is warm and dry.   Neurological:      General: No focal deficit present.      Mental Status: She is alert.      Comments: Nonverbal             Significant Labs: All pertinent labs within the past 24 hours have been reviewed.    Significant Imaging: I have  reviewed all pertinent imaging results/findings within the past 24 hours.

## 2024-06-11 NOTE — ASSESSMENT & PLAN NOTE
This patient does have evidence of infective focus  My overall impression is sepsis.  Source: Skin and Soft Tissue (location Abdominal)  Antibiotics given-   Antibiotics (72h ago, onward)      Start     Stop Route Frequency Ordered    06/10/24 0730  piperacillin-tazobactam (ZOSYN) 4.5 g in dextrose 5 % in water (D5W) 100 mL IVPB (MB+)         -- IV Every 12 hours (non-standard times) 06/10/24 0628    06/09/24 0915  mupirocin 2 % ointment         06/14/24 0859 Nasl 2 times daily 06/09/24 0805          Latest lactate reviewed-  Recent Labs   Lab 06/10/24  1114   LACTATE 3.2*       Organ dysfunction indicated by Encephalopathy    Fluid challenge Contraindicated- Fluid bolus is contraindicated in this patient due to End Stage Renal Disease     Post- resuscitation assessment No - Post resuscitation assessment not needed       Will Not start Pressors- Levophed for MAP of 65  Source control achieved by: Zosyn     Plan:  - Concerns septic source may be PEG site with associated purulent wound despite Wound Care attempts to protect with barriers. PEG placed by General Surgery 2/2024. General Surgery consulted regarding PEG site  - discontinued Vanc; will continue Zosyn   - Blood cultures obtained, follow through maturation  - Chest X Ray unrevealing

## 2024-06-11 NOTE — NURSING
Pt pulled out IV access this morning. Attempts were made to place a new one but unsuccessful. Midline order placed.

## 2024-06-11 NOTE — PROGRESS NOTES
[facility] - Telemetry Stepdown  Adult Nutrition  Progress Note    SUMMARY       Recommendations    Resume TFs when able:  - If Diabetic formula warranted, rec'd Glucerna 1.5 @ 50 mL/hr = 1800 kcals, 99 g of protein, 911 mL fluid.  - If Renal formula warranted, rec'd Novasource @ 40 mL/hr = 1920 kcals, 87 g of protein, 688 mL fluid.   RD to monitor & follow-up.    Goals: Meet % EEN, EPN by RD f/u date  Nutrition Goal Status: goal not met  Communication of RD Recs: reviewed with RN    Assessment and Plan    Moderate malnutrition    Malnutrition Type:  Context: acute illness or injury  Level: moderate    Related to (etiology):   Inability to consume sufficient energy     Signs and Symptoms (as evidenced by):   Weight loss, NFPE, Edema    Malnutrition Characteristic Summary:  Weight Loss (Malnutrition): 10% in 6 months  Subcutaneous Fat (Malnutrition): mild depletion  Muscle Mass (Malnutrition): mild depletion  Fluid Accumulation (Malnutrition): mild    Interventions/Recommendations (treatment strategy):  Collaboration of nutrition care w/ other providers    Nutrition Diagnosis Status:   Continues     Malnutrition Assessment    Malnutrition Context: acute illness or injury  Malnutrition Level: moderate    Weight Loss (Malnutrition): 10% in 6 months  Subcutaneous Fat (Malnutrition): mild depletion  Muscle Mass (Malnutrition): mild depletion  Fluid Accumulation (Malnutrition): mild     Reason for Assessment    Reason For Assessment: RD follow-up  Diagnosis: other (see comments) (Acute cystitis with hematuria)  Relevant Medical History: CVA, PEG, DM  Interdisciplinary Rounds: did not attend    General Information Comments: Remains NPO; TFs currently on hold for PEG site evaluation. Pt received HD yesterday. Moderate malnutrition diagnosis continues; please see PES statement for details.   Nutrition Discharge Planning: Adequate nutrition    Nutrition/Diet History    Spiritual, Cultural Beliefs, Buddhist Practices,  "Values that Affect Care: no  Food Allergies: NKFA  Factors Affecting Nutritional Intake: NPO    Anthropometrics    Temp: 98.6 °F (37 °C)  Height Method: Stated  Height: 5' 7" (170.2 cm)  Height (inches): 67 in  Weight Method: Bed Scale  Weight: 105.5 kg (232 lb 9.4 oz)  Weight (lb): 232.59 lb  Ideal Body Weight (IBW), Female: 135 lb  % Ideal Body Weight, Female (lb): 172.29 %  BMI (Calculated): 36.4  BMI Grade: 35 - 39.9 - obesity - grade II  Usual Body Weight (UBW), kg: (!) 136.4 kg  % Usual Body Weight: 90  % Weight Change From Usual Weight: -10.19 %    Lab/Procedures/Meds    Pertinent Labs Reviewed: reviewed  Pertinent Labs Comments: Creat 4.9, GFR 10, A1C 6.2  Pertinent Medications Reviewed: reviewed  Pertinent Medications Comments: -    Estimated/Assessed Needs    Weight Used For Calorie Calculations: 105.5 kg (232 lb 9.4 oz)    Energy Calorie Requirements (kcal): 2030 kcal/d  Energy Need Method: [place]-St Jeor (1.2 PAL)    Protein Requirements: 95 g/d (.9 g/kg)  Weight Used For Protein Calculations: 105.5 kg (232 lb 9.4 oz)    Estimated Fluid Requirement Method: other (see comments) (Per MD)  RDA Method (mL): 2030    CHO Requirement: 254g    Nutrition Prescription Ordered    Current Diet Order: NPO  Current Nutrition Support Formula Ordered: Other (Comment) (TFs held.)    Evaluation of Received Nutrient/Fluid Intake    I/O: -9.6L since 5/28    Comments: LBM: 6/10    Nutrition Risk    Level of Risk/Frequency of Follow-up:  (1x/week)     Monitor and Evaluation    Food and Nutrient Intake: enteral nutrition intake  Food and Nutrient Adminstration: enteral and parenteral nutrition administration  Physical Activity and Function: nutrition-related ADLs and IADLs  Anthropometric Measurements: weight, weight change  Biochemical Data, Medical Tests and Procedures: inflammatory profile, lipid profile, glucose/endocrine profile, gastrointestinal profile, electrolyte and renal panel  Nutrition-Focused Physical Findings: " overall appearance     Nutrition Follow-Up    RD Follow-up?: Yes

## 2024-06-11 NOTE — PROGRESS NOTES
Therapy with vancomycin has been discontinued by provider.  Will sign off, please re-consult, if needed.      Tabatha Velazquez, Pharm. D.   Pharmacist  [facility]

## 2024-06-11 NOTE — ASSESSMENT & PLAN NOTE
Creatine stable for now. BMP reviewed- noted Estimated Creatinine Clearance: 16.6 mL/min (A) (based on SCr of 4.9 mg/dL (H)). according to latest data. Based on current GFR, CKD stage is end stage.  Monitor UOP and serial BMP and adjust therapy as needed. Renally dose meds. Avoid nephrotoxic medications and procedures.    SW onboard for placement to [facility] to continue dialysis. Once patient gets accepted at [facility], next step will be to work with daughter on care home NH placement.     -- HD MWF (~1L fluid removal on average per session)  -- nephro following

## 2024-06-11 NOTE — PT/OT/SLP PROGRESS
Physical Therapy Treatment    Patient Name:  Sarah Saravia   MRN:  [id number]    Recommendations:     Discharge Recommendations: Moderate Intensity Therapy  Discharge Equipment Recommendations: hospital bed, lift device, wheelchair  Barriers to discharge: Pt requiring increased skilled assistance at current time.     Assessment:     Sarah Saravia is a 53 y.o. female admitted with a medical diagnosis of Acute cystitis with hematuria.  She presents with the following impairments/functional limitations: weakness, impaired endurance, impaired self care skills, impaired functional mobility, decreased coordination, decreased upper extremity function, decreased lower extremity function, decreased safety awareness, impaired skin, decreased ROM requiring max/total assistance of 2 and verbal cues for bed mob, scooting to EOB/HOB due to weakness, fear of falling.   In light of pt's current functional level and deficits, it is anticipated that pt will need to participate in a moderate intensity rehab program consisting of PT and OT in order to achieve full rehab potential to return to previous level of function and roles.  Pt remains motivated to participate in PT session and will cont to benefit from skilled PT intervention.  .    Rehab Prognosis: Fair; patient would benefit from acute skilled PT services to address these deficits and reach maximum level of function.    Recent Surgery: * No surgery found *      Plan:     During this hospitalization, patient to be seen 3 x/week to address the identified rehab impairments via gait training, therapeutic activities, therapeutic exercises, neuromuscular re-education and progress toward the following goals:    Plan of Care Expires:  06/22/24    Subjective     Chief Complaint: weakness  Pain/Comfort:  Pain Rating 1: 0/10  Pain Rating Post-Intervention 1: 0/10      Objective:     Communicated with nurse (Stacia) prior to session.  Patient found HOB elevated with PEG Tube, peripheral  IV, telemetry (no family present) upon PT entry to room.     General Precautions: Standard, aphasia, aspiration, fall, NPO  Orthopedic Precautions: N/A  Braces: N/A  Respiratory Status: Room air     Functional Mobility:  Bed Mobility:     Rolling Left:  maximal assistance  Rolling Right: max/total A  Scooting: anteriorly to the EOB with max A; to HOB via drawsheet dependent of 2 while be in trendelenburg  Supine to Sit: max A of 2 for trunk elevation and LE's, exiting on the R side, HOB at 20* angle  Sit to Supine: total A of 2 for trunk and LE's, HOB flat  Transfers:     Sit to Stand:  from EOB NP due to pt refusal  Balance: static/dynamic sitting at the EOB with SBA      AM-PAC 6 CLICK MOBILITY  Turning over in bed (including adjusting bedclothes, sheets and blankets)?: 2  Sitting down on and standing up from a chair with arms (e.g., wheelchair, bedside commode, etc.): 1  Moving from lying on back to sitting on the side of the bed?: 2  Moving to and from a bed to a chair (including a wheelchair)?: 1  Need to walk in hospital room?: 1  Climbing 3-5 steps with a railing?: 1  Basic Mobility Total Score: 8       Treatment & Education:  Patient provided with daily orientation and goals of this PT session. They were educated to call for assistance and to transfer with hospital staff only.  Also, pt was educated on the effects of prolonged immobility and the importance of performing OOB activity and exercises to promote healing and reduce recovery time    Patient left HOB elevated with all lines intact, call button in reach, and nurse notified..    GOALS:   Multidisciplinary Problems       Physical Therapy Goals          Problem: Physical Therapy    Goal Priority Disciplines Outcome Goal Variances Interventions   Physical Therapy Goal     PT, PT/OT Progressing     Description: Goals to be met by: 05/07/24   Goals remain appropriate 5/3/2024 to be met by 5/17/24  Goals updated 5/13/2024 to be met by 5/27/24  Goals updated  24 to be met by 24  Goals remain appropriate 24 to be met by 24    Patient will increase functional independence with mobility by performin. Supine to sit with Maximum Assistance  2. Sit to supine with Maximum Assistance  3. Rolling to Left and Right with Moderate Assistance.  4. Sitting at edge of bed 5 minutes with Moderate Assistance- MET , monitor consistency  4a. Sitting at edge of bed 10 minutes with Supervision  5. Lower extremity exercise program x20 reps per handout, with assistance as needed  6. Sit to stand transfer with Maximum Assistance with or without LRAD  7. Stand for 2 minutes with Maximum Assistance with or without LRAD                         Time Tracking:     PT Received On: 24  PT Start Time: 1224     PT Stop Time: 1252  PT Total Time (min): 28 min     Billable Minutes: Therapeutic Activity 28    Treatment Type: Treatment  PT/PTA: PTA     Number of PTA visits since last PT visit: 4     2024

## 2024-06-11 NOTE — PROGRESS NOTES
[facility] - Telemetry Stepdown  Nephrology  Progress Note    Patient Name: Sarah Saravia  MRN: [id number]  Admission Date: 4/10/2024  Hospital Length of Stay: 62 days  Attending Provider: Soco Erickson MD   Primary Care Physician: Deanna, Primary Doctor  Principal Problem:Acute cystitis with hematuria    Subjective:     Interval History:   HD completed yesterday. Electrolytes remain stable. No distress on exam. Oxygenating well on RA.       Review of patient's allergies indicates:  No Known Allergies  Current Facility-Administered Medications   Medication Frequency    [START ON 6/12/2024] 0.9%  NaCl infusion Once    acetaminophen oral solution 650 mg Q6H PRN    ascorbic acid (vitamin C) tablet 500 mg BID    [START ON 6/12/2024] aspirin chewable tablet 81 mg Daily    atorvastatin tablet 40 mg Daily    calcium gluconate 1 g in dextrose 5 % in water (D5W) 50 mL IVPB (MB+) Q10 Min PRN    dextrose 10 % infusion Continuous PRN    dextrose 10% bolus 125 mL 125 mL PRN    dextrose 10% bolus 250 mL 250 mL PRN    epoetin merry injection 6,100 Units Every Mon, Wed, Fri    glucagon (human recombinant) injection 1 mg PRN    glucose chewable tablet 16 g PRN    glucose chewable tablet 24 g PRN    heparin (porcine) injection 3,000 Units PRN    heparin (porcine) injection 5,000 Units Q8H    hepatitis B virus vacc.rec(PF) injection 20 mcg vaccine x 1 dose    insulin aspart U-100 pen 0-10 Units QID (AC + HS) PRN    insulin glargine U-100 (Lantus) pen 13 Units QHS    metoprolol tartrate (LOPRESSOR) tablet 25 mg BID    mupirocin 2 % ointment BID    ondansetron 4 mg/5 mL solution 4 mg Q6H PRN    oxyCODONE 5 mg/5 mL solution 5 mg Q4H PRN    pantoprazole suspension 40 mg Daily    piperacillin-tazobactam (ZOSYN) 4.5 g in dextrose 5 % in water (D5W) 100 mL IVPB (MB+) Q12H    polyethylene glycol packet 17 g TID    sodium chloride 0.9% bolus 250 mL 250 mL PRN    sodium chloride 0.9% flush 10 mL Q12H PRN    sodium zirconium cyclosilicate packet 5 g  TID    vancomycin - pharmacy to dose pharmacy to manage frequency       Objective:     Vital Signs (Most Recent):  Temp: 98.8 °F (37.1 °C) (06/11/24 0741)  Pulse: 97 (06/11/24 0741)  Resp: 18 (06/11/24 0741)  BP: 114/60 (06/11/24 0741)  SpO2: 98 % (06/11/24 0741) Vital Signs (24h Range):  Temp:  [98.3 °F (36.8 °C)-100.3 °F (37.9 °C)] 98.8 °F (37.1 °C)  Pulse:  [] 97  Resp:  [17-20] 18  SpO2:  [95 %-99 %] 98 %  BP: (113-119)/(48-77) 114/60     Weight: 105.5 kg (232 lb 9.4 oz) (06/04/24 1052)  Body mass index is 36.43 kg/m².  Body surface area is 2.23 meters squared.    I/O last 3 completed shifts:  In: 750 [I.V.:350; Other:400]  Out: 400 [Other:400]     Physical Exam  Vitals and nursing note reviewed.   HENT:      Head: Normocephalic.   Cardiovascular:      Rate and Rhythm: Normal rate.   Pulmonary:      Effort: Pulmonary effort is normal.   Abdominal:      General: There is no distension.      Tenderness: There is no abdominal tenderness.   Musculoskeletal:         General: No swelling.   Neurological:      Mental Status: She is alert.   Psychiatric:         Mood and Affect: Mood normal.          Significant Labs:  CBC:   Recent Labs   Lab 06/11/24  0520   WBC 6.81   RBC 3.32*   HGB 8.5*   HCT 27.6*      MCV 83   MCH 25.6*   MCHC 30.8*     CMP:   Recent Labs   Lab 06/11/24  0519 06/11/24  0816     --    CALCIUM 9.6  --    ALBUMIN 2.8*  --    PROT 7.3  --    *  --    K 4.1 4.3   CO2 23  --    CL 93*  --    BUN 29*  --    CREATININE 4.9*  --    ALKPHOS 70  --    ALT 30  --    AST 29  --    BILITOT 0.3  --      All labs within the past 24 hours have been reviewed.     Assessment/Plan:     Renal/  * Acute cystitis with hematuria  - defer to primary     ESRD (end stage renal disease) on dialysis  53 y.o. Black or  Female ESRD-HD [facility]F at [facility] presents to ED on 4/10/2024 with UTI.    Of note, the patient was initiated on RRT in February 2024 after being admitted with ALVARADO 2/2  iATN due to septic shock. Perm cath placed on 2/29/24. She was transferred to [facility] on 3/12. Deemed ESRD at LTAC.  Nephrology consulted for inpatient ESRD-HD management    Assessment:   - Will plan for HD tomorrow for clearance and volume management.   - Continue to monitor intake and output  - Please avoid gadolinium, fleets, phos-based laxatives, NSAIDs  - Dialysis thrice weekly unless more urgent indications arise. Will evaluate RRT requirements Daily.      Lab Results   Component Value Date    FESATURATED 10 (L) 03/15/2024    FERRITIN 414 (H) 03/15/2024       - Goal in ESRD is Hgb of 10-11. Continue EPO.       Secondary hyperparathyroid of renal origin   - phos 4.2 - no indication for phos binders       Ayaz's gangrene  - Per chart     Endocrine  Hyponatremia  Free water restriction   Will continue to adjust Na with HD         Thank you for your consult. I will follow-up with patient. Please contact us if you have any additional questions.    Shwetha Gregory DNP, FNP-C  Nephrology  [facility] - Telemetry Stepdown

## 2024-06-11 NOTE — ASSESSMENT & PLAN NOTE
Nutrition consulted. Most recent weight and BMI monitored-     Measurements:  Wt Readings from Last 1 Encounters:   06/11/24 105.5 kg (232 lb 9.4 oz)   Body mass index is 36.43 kg/m².    Patient has been screened and assessed by RD.    Malnutrition Type:  Context: acute illness or injury  Level: moderate    Malnutrition Characteristic Summary:  Weight Loss (Malnutrition): 10% in 6 months  Subcutaneous Fat (Malnutrition): mild depletion  Muscle Mass (Malnutrition): mild depletion  Fluid Accumulation (Malnutrition): mild    Interventions/Recommendations (treatment strategy):  1.  - restarted TF  - previous history of failed swallow studies

## 2024-06-11 NOTE — CONSULTS
EDUARDO consulted for PIV insertion in real time using ultrasound guidance.    Indication: PVA  Gauge and length: 20 g x 1 3/4 inch   Location: L[facility]

## 2024-06-11 NOTE — PROGRESS NOTES
Pharmacokinetic Assessment Follow Up: IV Vancomycin    Vancomycin serum concentration assessment(s):    -Vancomycin random 20.8, slightly above goal of 15 - 20 for empiric therapy  -No re-dose today  -Plan for re-dose with vancomycin 500 mg IVPB following HD tomorrow  -Next random level 06/14 AM for preHD level    Drug levels (last 3 results):  Recent Labs   Lab Result Units 06/11/24  0520   Vancomycin, Random ug/mL 20.8       Pharmacy will continue to follow and monitor vancomycin.    Please contact pharmacy at extension [phone] for questions regarding this assessment.    Thank you for the consult,   Brett Ramirez       Patient brief summary:  Sarah Saravia is a 53 y.o. female initiated on antimicrobial therapy with IV Vancomycin for treatment of  empiric therapy    The patient's current regimen is vancomycin pulse dosing    Drug Allergies:   Review of patient's allergies indicates:  No Known Allergies    Actual Body Weight:   106 kg    Renal Function:   Estimated Creatinine Clearance: 16.6 mL/min (A) (based on SCr of 4.9 mg/dL (H)).,     Dialysis Method (if applicable):  intermittent HD    CBC (last 72 hours):  Recent Labs   Lab Result Units 06/09/24  0901 06/10/24  0719 06/11/24  0520   WBC K/uL 9.57 14.31* 6.81   Hemoglobin g/dL 10.2* 9.8* 8.5*   Hematocrit % 34.8* 33.3* 27.6*   Platelets K/uL 323 182 198   Gran % % 59.2 86.1* 55.5   Lymph % % 24.5 5.7* 18.2   Mono % % 12.0 6.8 19.7*   Eosinophil % % 3.3 0.6 5.6   Basophil % % 0.5 0.3 0.7   Differential Method  Automated Automated Automated       Metabolic Panel (last 72 hours):  Recent Labs   Lab Result Units 06/09/24  0901 06/09/24  1147 06/09/24  1955 06/10/24  0006 06/10/24  0616 06/10/24  1114 06/10/24  2314 06/11/24  0519 06/11/24  0816   Sodium mmol/L 127* 124* 128* 129* 127*  127* 128*  --  130*  --    Potassium mmol/L 6.3* 6.2*  6.2* 6.5* 6.1*  6.1* 4.1  4.1  4.1 4.7 4.1 4.1 4.3   Chloride mmol/L 92* 88* 93* 89* 95  95 92*  --  93*  --    CO2  mmol/L 19* 21* 20* 22* 17*  17* 22*  --  23  --    Glucose mg/dL 192* 378* 179* 200* 177*  177* 236*  --  110  --    BUN mg/dL 57* 60* 48* 46* 21*  21* 22*  --  29*  --    Creatinine mg/dL 7.2* 7.2* 6.0* 6.2* 3.3*  3.3* 3.6*  --  4.9*  --    Albumin g/dL 3.4*  --  3.4*  --  2.9* 3.0*  --  2.8*  --    Total Bilirubin mg/dL 0.3  --  0.4  --   --   --   --  0.3  --    Alkaline Phosphatase U/L 103  --  108  --   --   --   --  70  --    AST U/L 23  --  24  --   --   --   --  29  --    ALT U/L 31  --  30  --   --   --   --  30  --    Magnesium mg/dL  --   --   --   --   --   --   --  2.0  --    Phosphorus mg/dL  --   --   --   --  2.6* 2.9  --  4.2  --        Vancomycin Administrations:  vancomycin given in the last 96 hours                     vancomycin 1,500 mg in dextrose 5 % (D5W) 250 mL IVPB (Vial-Mate) (mg) 1,500 mg New Bag 06/10/24 0909                    Microbiologic Results:  Microbiology Results (last 7 days)       Procedure Component Value Units Date/Time    Blood culture [[id number]] Collected: 06/10/24 0719    Order Status: Completed Specimen: Blood Updated: 06/11/24 1012     Blood Culture, Routine No Growth to date      No Growth to date    Narrative:      Lft hand    Blood culture [[id number]] Collected: 06/10/24 0719    Order Status: Completed Specimen: Blood Updated: 06/11/24 1012     Blood Culture, Routine No Growth to date      No Growth to date    Narrative:      Lft forearm

## 2024-06-11 NOTE — ASSESSMENT & PLAN NOTE
Adjusting insulin to account for diabetic TF    Patient's FSGs are controlled on current medication regimen.  Last A1c reviewed-   Lab Results   Component Value Date    HGBA1C 6.2 (H) 05/27/2024     Most recent fingerstick glucose reviewed-   Recent Labs   Lab 06/10/24  1556 06/10/24  2145 06/11/24  0806 06/11/24  1250   POCTGLUCOSE 209* 166* 118* 134*       Current correctional scale  Medium  Maintain anti-hyperglycemic dose as follows-   Antihyperglycemics (From admission, onward)      Start     Stop Route Frequency Ordered    06/09/24 2100  insulin glargine U-100 (Lantus) pen 13 Units         -- SubQ Nightly 06/09/24 0756    06/09/24 1255  insulin aspart U-100 pen 0-10 Units         -- SubQ Before meals & nightly PRN 06/09/24 1155          Hold Oral hypoglycemics while patient is in the hospital.  POCT glucose checks   -Restart tube feeds

## 2024-06-11 NOTE — SUBJECTIVE & OBJECTIVE
Interval History:   HD completed yesterday. Electrolytes remain stable. No distress on exam. Oxygenating well on RA.       Review of patient's allergies indicates:  No Known Allergies  Current Facility-Administered Medications   Medication Frequency    [START ON 6/12/2024] 0.9%  NaCl infusion Once    acetaminophen oral solution 650 mg Q6H PRN    ascorbic acid (vitamin C) tablet 500 mg BID    [START ON 6/12/2024] aspirin chewable tablet 81 mg Daily    atorvastatin tablet 40 mg Daily    calcium gluconate 1 g in dextrose 5 % in water (D5W) 50 mL IVPB (MB+) Q10 Min PRN    dextrose 10 % infusion Continuous PRN    dextrose 10% bolus 125 mL 125 mL PRN    dextrose 10% bolus 250 mL 250 mL PRN    epoetin merry injection 6,100 Units Every Mon, Wed, Fri    glucagon (human recombinant) injection 1 mg PRN    glucose chewable tablet 16 g PRN    glucose chewable tablet 24 g PRN    heparin (porcine) injection 3,000 Units PRN    heparin (porcine) injection 5,000 Units Q8H    hepatitis B virus vacc.rec(PF) injection 20 mcg vaccine x 1 dose    insulin aspart U-100 pen 0-10 Units QID (AC + HS) PRN    insulin glargine U-100 (Lantus) pen 13 Units QHS    metoprolol tartrate (LOPRESSOR) tablet 25 mg BID    mupirocin 2 % ointment BID    ondansetron 4 mg/5 mL solution 4 mg Q6H PRN    oxyCODONE 5 mg/5 mL solution 5 mg Q4H PRN    pantoprazole suspension 40 mg Daily    piperacillin-tazobactam (ZOSYN) 4.5 g in dextrose 5 % in water (D5W) 100 mL IVPB (MB+) Q12H    polyethylene glycol packet 17 g TID    sodium chloride 0.9% bolus 250 mL 250 mL PRN    sodium chloride 0.9% flush 10 mL Q12H PRN    sodium zirconium cyclosilicate packet 5 g TID    vancomycin - pharmacy to dose pharmacy to manage frequency       Objective:     Vital Signs (Most Recent):  Temp: 98.8 °F (37.1 °C) (06/11/24 0741)  Pulse: 97 (06/11/24 0741)  Resp: 18 (06/11/24 0741)  BP: 114/60 (06/11/24 0741)  SpO2: 98 % (06/11/24 0741) Vital Signs (24h Range):  Temp:  [98.3 °F (36.8  °C)-100.3 °F (37.9 °C)] 98.8 °F (37.1 °C)  Pulse:  [] 97  Resp:  [17-20] 18  SpO2:  [95 %-99 %] 98 %  BP: (113-119)/(48-77) 114/60     Weight: 105.5 kg (232 lb 9.4 oz) (06/04/24 1052)  Body mass index is 36.43 kg/m².  Body surface area is 2.23 meters squared.    I/O last 3 completed shifts:  In: 750 [I.V.:350; Other:400]  Out: 400 [Other:400]     Physical Exam  Vitals and nursing note reviewed.   HENT:      Head: Normocephalic.   Cardiovascular:      Rate and Rhythm: Normal rate.   Pulmonary:      Effort: Pulmonary effort is normal.   Abdominal:      General: There is no distension.      Tenderness: There is no abdominal tenderness.   Musculoskeletal:         General: No swelling.   Neurological:      Mental Status: She is alert.   Psychiatric:         Mood and Affect: Mood normal.          Significant Labs:  CBC:   Recent Labs   Lab 06/11/24  0520   WBC 6.81   RBC 3.32*   HGB 8.5*   HCT 27.6*      MCV 83   MCH 25.6*   MCHC 30.8*     CMP:   Recent Labs   Lab 06/11/24  0519 06/11/24  0816     --    CALCIUM 9.6  --    ALBUMIN 2.8*  --    PROT 7.3  --    *  --    K 4.1 4.3   CO2 23  --    CL 93*  --    BUN 29*  --    CREATININE 4.9*  --    ALKPHOS 70  --    ALT 30  --    AST 29  --    BILITOT 0.3  --      All labs within the past 24 hours have been reviewed.

## 2024-06-11 NOTE — ASSESSMENT & PLAN NOTE
53 y.o. Black or  Female ESRD-HD M-W-F at [facility] presents to ED on 4/10/2024 with UTI.    Of note, the patient was initiated on RRT in February 2024 after being admitted with ALVARADO 2/2 iATN due to septic shock. Perm cath placed on 2/29/24. She was transferred to [facility] on 3/12. Deemed ESRD at [facility].  Nephrology consulted for inpatient ESRD-HD management    Assessment:   - Will plan for HD tomorrow for clearance and volume management.   - Continue to monitor intake and output  - Please avoid gadolinium, fleets, phos-based laxatives, NSAIDs  - Dialysis thrice weekly unless more urgent indications arise. Will evaluate RRT requirements Daily.      Lab Results   Component Value Date    FESATURATED 10 (L) 03/15/2024    FERRITIN 414 (H) 03/15/2024       - Goal in ESRD is Hgb of 10-11. Continue EPO.       Secondary hyperparathyroid of renal origin   - phos 4.2 - no indication for phos binders

## 2024-06-11 NOTE — ASSESSMENT & PLAN NOTE
On PEG tube.Wound care with PEG dressing changes.   - CT abdomen with PEG in place.  - will restart TF

## 2024-06-11 NOTE — ASSESSMENT & PLAN NOTE
Patient has hyponatremia which is uncontrolled,We will aim to correct the sodium by 4-6mEq in 24 hours. We will monitor sodium Daily. The hyponatremia is due to ESRD. Discussed with nephrology, dialysate bath adjusted to account for and correct hyponatremia.  Recent Labs   Lab 06/11/24  0519   *       - Daily morning CMP  - Continue to montior

## 2024-06-11 NOTE — NURSING
...Nurses Note -- 4 Eyes      6/10/2024   10:24 PM      Skin assessed during: Q Shift Change      [] No Altered Skin Integrity Present    []Prevention Measures Documented      [x] Yes- Altered Skin Integrity Present or Discovered   [] LDA Added if Not in Epic (Describe Wound)   [x] New Altered Skin Integrity was Present on Admit and Documented in LDA   [] Wound Image Taken    Wound Care Consulted? Yes    Attending Nurse:  Joaquin BARBER    Second RN/Staff Member:  Darryl BARBER  -No new altered skin integrity apart from the ones charted.

## 2024-06-12 ENCOUNTER — OFFICE VISIT (OUTPATIENT)
Dept: DIALYSIS | Facility: HOSPITAL | Age: 54
DRG: 689 | End: 2024-06-12
Attending: EMERGENCY MEDICINE
Payer: MEDICAID

## 2024-06-12 LAB
ALBUMIN SERPL BCP-MCNC: 2.7 G/DL (ref 3.5–5.2)
ALP SERPL-CCNC: 67 U/L (ref 55–135)
ALT SERPL W/O P-5'-P-CCNC: 31 U/L (ref 10–44)
ANION GAP SERPL CALC-SCNC: 15 MMOL/L (ref 8–16)
AST SERPL-CCNC: 28 U/L (ref 10–40)
BASOPHILS # BLD AUTO: 0.03 K/UL (ref 0–0.2)
BASOPHILS NFR BLD: 0.5 % (ref 0–1.9)
BILIRUB SERPL-MCNC: 0.3 MG/DL (ref 0.1–1)
BUN SERPL-MCNC: 38 MG/DL (ref 6–20)
CALCIUM SERPL-MCNC: 9.5 MG/DL (ref 8.7–10.5)
CHLORIDE SERPL-SCNC: 91 MMOL/L (ref 95–110)
CO2 SERPL-SCNC: 21 MMOL/L (ref 23–29)
CREAT SERPL-MCNC: 6.3 MG/DL (ref 0.5–1.4)
DIFFERENTIAL METHOD BLD: ABNORMAL
EOSINOPHIL # BLD AUTO: 0.6 K/UL (ref 0–0.5)
EOSINOPHIL NFR BLD: 9.7 % (ref 0–8)
ERYTHROCYTE [DISTWIDTH] IN BLOOD BY AUTOMATED COUNT: 15.7 % (ref 11.5–14.5)
EST. GFR  (NO RACE VARIABLE): 7.4 ML/MIN/1.73 M^2
GLUCOSE SERPL-MCNC: 101 MG/DL (ref 70–110)
HCT VFR BLD AUTO: 29.5 % (ref 37–48.5)
HGB BLD-MCNC: 8.8 G/DL (ref 12–16)
IMM GRANULOCYTES # BLD AUTO: 0.02 K/UL (ref 0–0.04)
IMM GRANULOCYTES NFR BLD AUTO: 0.3 % (ref 0–0.5)
LYMPHOCYTES # BLD AUTO: 1.6 K/UL (ref 1–4.8)
LYMPHOCYTES NFR BLD: 27.3 % (ref 18–48)
MAGNESIUM SERPL-MCNC: 2.1 MG/DL (ref 1.6–2.6)
MCH RBC QN AUTO: 25 PG (ref 27–31)
MCHC RBC AUTO-ENTMCNC: 29.8 G/DL (ref 32–36)
MCV RBC AUTO: 84 FL (ref 82–98)
MONOCYTES # BLD AUTO: 1 K/UL (ref 0.3–1)
MONOCYTES NFR BLD: 17.1 % (ref 4–15)
NEUTROPHILS # BLD AUTO: 2.6 K/UL (ref 1.8–7.7)
NEUTROPHILS NFR BLD: 45.1 % (ref 38–73)
NRBC BLD-RTO: 0 /100 WBC
PHOSPHATE SERPL-MCNC: 5.3 MG/DL (ref 2.7–4.5)
PLATELET # BLD AUTO: 226 K/UL (ref 150–450)
PMV BLD AUTO: 10.5 FL (ref 9.2–12.9)
POCT GLUCOSE: 100 MG/DL (ref 70–110)
POCT GLUCOSE: 105 MG/DL (ref 70–110)
POCT GLUCOSE: 129 MG/DL (ref 70–110)
POCT GLUCOSE: 148 MG/DL (ref 70–110)
POTASSIUM SERPL-SCNC: 3.9 MMOL/L (ref 3.5–5.1)
POTASSIUM SERPL-SCNC: 4 MMOL/L (ref 3.5–5.1)
POTASSIUM SERPL-SCNC: 4.3 MMOL/L (ref 3.5–5.1)
POTASSIUM SERPL-SCNC: 4.5 MMOL/L (ref 3.5–5.1)
PROT SERPL-MCNC: 7 G/DL (ref 6–8.4)
RBC # BLD AUTO: 3.52 M/UL (ref 4–5.4)
SODIUM SERPL-SCNC: 127 MMOL/L (ref 136–145)
WBC # BLD AUTO: 5.79 K/UL (ref 3.9–12.7)

## 2024-06-12 PROCEDURE — 3044F HG A1C LEVEL LT 7.0%: CPT | Mod: CPTII,,, | Performed by: NURSE PRACTITIONER

## 2024-06-12 PROCEDURE — 97530 THERAPEUTIC ACTIVITIES: CPT

## 2024-06-12 PROCEDURE — 25000003 PHARM REV CODE 250

## 2024-06-12 PROCEDURE — 20600001 HC STEP DOWN PRIVATE ROOM

## 2024-06-12 PROCEDURE — 25000003 PHARM REV CODE 250: Performed by: STUDENT IN AN ORGANIZED HEALTH CARE EDUCATION/TRAINING PROGRAM

## 2024-06-12 PROCEDURE — 63600175 PHARM REV CODE 636 W HCPCS

## 2024-06-12 PROCEDURE — 25000242 PHARM REV CODE 250 ALT 637 W/ HCPCS: Performed by: STUDENT IN AN ORGANIZED HEALTH CARE EDUCATION/TRAINING PROGRAM

## 2024-06-12 PROCEDURE — 84132 ASSAY OF SERUM POTASSIUM: CPT | Mod: 91

## 2024-06-12 PROCEDURE — 84132 ASSAY OF SERUM POTASSIUM: CPT

## 2024-06-12 PROCEDURE — 63600175 PHARM REV CODE 636 W HCPCS: Performed by: NURSE PRACTITIONER

## 2024-06-12 PROCEDURE — 3066F NEPHROPATHY DOC TX: CPT | Mod: CPTII,,, | Performed by: NURSE PRACTITIONER

## 2024-06-12 PROCEDURE — 83735 ASSAY OF MAGNESIUM: CPT | Performed by: STUDENT IN AN ORGANIZED HEALTH CARE EDUCATION/TRAINING PROGRAM

## 2024-06-12 PROCEDURE — 84100 ASSAY OF PHOSPHORUS: CPT | Performed by: STUDENT IN AN ORGANIZED HEALTH CARE EDUCATION/TRAINING PROGRAM

## 2024-06-12 PROCEDURE — 80053 COMPREHEN METABOLIC PANEL: CPT | Performed by: STUDENT IN AN ORGANIZED HEALTH CARE EDUCATION/TRAINING PROGRAM

## 2024-06-12 PROCEDURE — 27000207 HC ISOLATION

## 2024-06-12 PROCEDURE — 36415 COLL VENOUS BLD VENIPUNCTURE: CPT | Performed by: STUDENT IN AN ORGANIZED HEALTH CARE EDUCATION/TRAINING PROGRAM

## 2024-06-12 PROCEDURE — 36415 COLL VENOUS BLD VENIPUNCTURE: CPT

## 2024-06-12 PROCEDURE — 80100014 HC HEMODIALYSIS 1:1

## 2024-06-12 PROCEDURE — 97535 SELF CARE MNGMENT TRAINING: CPT

## 2024-06-12 PROCEDURE — 90935 HEMODIALYSIS ONE EVALUATION: CPT | Mod: ,,, | Performed by: NURSE PRACTITIONER

## 2024-06-12 PROCEDURE — 85025 COMPLETE CBC W/AUTO DIFF WBC: CPT | Performed by: STUDENT IN AN ORGANIZED HEALTH CARE EDUCATION/TRAINING PROGRAM

## 2024-06-12 RX ORDER — SEVELAMER CARBONATE FOR ORAL SUSPENSION 800 MG/1
0.8 POWDER, FOR SUSPENSION ORAL
Status: DISCONTINUED | OUTPATIENT
Start: 2024-06-12 | End: 2024-06-19

## 2024-06-12 RX ORDER — AMOXICILLIN AND CLAVULANATE POTASSIUM 250; 62.5 MG/5ML; MG/5ML
500 POWDER, FOR SUSPENSION ORAL NIGHTLY
Status: DISCONTINUED | OUTPATIENT
Start: 2024-06-12 | End: 2024-06-13

## 2024-06-12 RX ORDER — AMOXICILLIN AND CLAVULANATE POTASSIUM 250; 62.5 MG/5ML; MG/5ML
500 POWDER, FOR SUSPENSION ORAL NIGHTLY
Start: 2024-06-12 | End: 2024-06-17 | Stop reason: HOSPADM

## 2024-06-12 RX ADMIN — AMOXICILLIN AND CLAVULANATE POTASSIUM 500 MG: 250; 62.5 POWDER, FOR SUSPENSION ORAL at 09:06

## 2024-06-12 RX ADMIN — ERYTHROPOIETIN 6100 UNITS: 10000 INJECTION, SOLUTION INTRAVENOUS; SUBCUTANEOUS at 05:06

## 2024-06-12 RX ADMIN — METOPROLOL TARTRATE 25 MG: 25 TABLET, FILM COATED ORAL at 08:06

## 2024-06-12 RX ADMIN — HEPARIN SODIUM 5000 UNITS: 5000 INJECTION INTRAVENOUS; SUBCUTANEOUS at 09:06

## 2024-06-12 RX ADMIN — METOPROLOL TARTRATE 25 MG: 25 TABLET, FILM COATED ORAL at 09:06

## 2024-06-12 RX ADMIN — ACETAMINOPHEN 650 MG: 650 SOLUTION ORAL at 08:06

## 2024-06-12 RX ADMIN — OXYCODONE HYDROCHLORIDE 5 MG: 5 SOLUTION ORAL at 09:06

## 2024-06-12 RX ADMIN — INSULIN GLARGINE 13 UNITS: 100 INJECTION, SOLUTION SUBCUTANEOUS at 09:06

## 2024-06-12 RX ADMIN — ATORVASTATIN CALCIUM 40 MG: 40 TABLET, FILM COATED ORAL at 08:06

## 2024-06-12 RX ADMIN — PIPERACILLIN SODIUM AND TAZOBACTAM SODIUM 4.5 G: 4; .5 INJECTION, POWDER, FOR SOLUTION INTRAVENOUS at 08:06

## 2024-06-12 RX ADMIN — HEPARIN SODIUM 3000 UNITS: 1000 INJECTION, SOLUTION INTRAVENOUS; SUBCUTANEOUS at 02:06

## 2024-06-12 RX ADMIN — SODIUM CHLORIDE 500 ML: 9 INJECTION, SOLUTION INTRAVENOUS at 09:06

## 2024-06-12 RX ADMIN — HEPARIN SODIUM 5000 UNITS: 5000 INJECTION INTRAVENOUS; SUBCUTANEOUS at 06:06

## 2024-06-12 RX ADMIN — PANTOPRAZOLE SODIUM 40 MG: 40 GRANULE, DELAYED RELEASE ORAL at 08:06

## 2024-06-12 RX ADMIN — ASPIRIN 81 MG CHEWABLE TABLET 81 MG: 81 TABLET CHEWABLE at 08:06

## 2024-06-12 RX ADMIN — MUPIROCIN: 20 OINTMENT TOPICAL at 08:06

## 2024-06-12 RX ADMIN — OXYCODONE HYDROCHLORIDE 5 MG: 5 SOLUTION ORAL at 08:06

## 2024-06-12 RX ADMIN — MUPIROCIN: 20 OINTMENT TOPICAL at 09:06

## 2024-06-12 RX ADMIN — Medication 500 MG: at 08:06

## 2024-06-12 RX ADMIN — Medication 500 MG: at 09:06

## 2024-06-12 RX ADMIN — POLYETHYLENE GLYCOL 3350 17 G: 17 POWDER, FOR SOLUTION ORAL at 08:06

## 2024-06-12 RX ADMIN — POLYETHYLENE GLYCOL 3350 17 G: 17 POWDER, FOR SOLUTION ORAL at 09:06

## 2024-06-12 NOTE — ASSESSMENT & PLAN NOTE
Creatine stable for now. BMP reviewed- noted Estimated Creatinine Clearance: 12.9 mL/min (A) (based on SCr of 6.3 mg/dL (H)). according to latest data. Based on current GFR, CKD stage is end stage.  Monitor UOP and serial BMP and adjust therapy as needed. Renally dose meds. Avoid nephrotoxic medications and procedures.    SW onboard for placement to [facility] to continue dialysis. Once patient gets accepted at [facility] [place], [facility], next step will be to work with daughter on [facility] NH placement.     -- HD MWF (~1L fluid removal on average per session)  -- nephro following

## 2024-06-12 NOTE — PT/OT/SLP PROGRESS
Occupational Therapy   Treatment    Name: Sarah Saravia  MRN: [id number]  Admitting Diagnosis:  Acute cystitis with hematuria       Recommendations:     Discharge Recommendations: Moderate Intensity Therapy  Discharge Equipment Recommendations:  hospital bed, lift device, wheelchair  Barriers to discharge:  Other (Comment) (requires significant assist)    Assessment:     Sarah Saravia is a 53 y.o. female with a medical diagnosis of Acute cystitis with hematuria.  She presents with deficits in cognition effecting performance with self-care tasks and mobility. Pt.'s balance is a SBA level seated EOB. Pt. Does engage in familiar tasks with demonstration and verbal cues but not consistently. Patient would benefit from continued OT services to maximize level of safety and independence with self-care tasks.   Performance deficits affecting function are weakness, impaired endurance, impaired self care skills, impaired functional mobility, impaired cognition, decreased upper extremity function, decreased lower extremity function, impaired cardiopulmonary response to activity, decreased safety awareness, edema.     Rehab Prognosis:  Good; patient would benefit from acute skilled OT services to address these deficits and reach maximum level of function.       Plan:     Patient to be seen 3 x/week to address the above listed problems via self-care/home management, therapeutic activities, therapeutic exercises, neuromuscular re-education  Plan of Care Expires: 06/13/24  Plan of Care Reviewed with: patient    Subjective     Chief Complaint: No verbalizations on this date  Patient/Family Comments/goals: No goals stated  Pain/Comfort:  Pain Rating 1: 0/10  Pain Rating Post-Intervention 1: 0/10    Objective:     Communicated with: nurse prior to session.  Patient found supine with PEG Tube, peripheral IV upon OT entry to room.    General Precautions: Standard, fall, aspiration, NPO, aphasia    Orthopedic Precautions:N/A  Braces:  N/A  Respiratory Status: Room air     Occupational Performance:     Bed Mobility:    Patient completed Rolling/Turning to Left with  maximal assistance x 2  Patient completed Scooting/Bridging with total assistance  Patient completed Supine to Sit with maximal assistance and 2 persons  Patient completed Sit to Supine with total assistance and 2 persons     Functional Mobility/Transfers:  Drawsheet x 2 to HOB    Activities of Daily Living:  Grooming: moderate assistance to wash face seated EOB pt only washed mouth region      AMPAC 6 Click ADL: 9    Treatment & Education:  Pt. Sat EOB with SBA.  Pt. Did not follow demonstrated or verbal commands on this date to kick foot  forward towards target and task performed x 5 trials to BLE with Total A  Pt. Did not attempt to apply lip balm or swab mouth on this date  Pt. Able to put on glasses with SBA  Pt. Wrote name (first and last) independently  Pt. Attempted tic tac toe game but not able to follow task correctly  Pt. Unable to write numbers following a sequence  from 1 to 10    Patient left supine with all lines intact and call button in reach    GOALS:   Multidisciplinary Problems       Occupational Therapy Goals          Problem: Occupational Therapy    Goal Priority Disciplines Outcome Interventions   Occupational Therapy Goal     OT, PT/OT Progressing    Description: Goals to be met by: 5/22/24 Goals revised as needed on 05-30 to be met by 06-19:    Patient will increase functional independence with ADLs by performing:    UE Dressing with Maximum Assistance.MET 05-30  Revised: UBD with Min A  Grooming while EOB with Maximum Assistance.Met 05-30  Revised: Grooming seated EOB with CGA  Sitting at edge of bed x5 minutes with Maximum Assistance. - Met 5/22  Revised: Sit EOB x 20 minutes with SBA  MET 06-10-24  Rolling to Bilateral with Moderate Assistance.NOT MET     Supine to sit with Maximum Assistance. NOT MET                           Time Tracking:     OT Date of  Treatment: 06/12/24  OT Start Time: 0954  OT Stop Time: 1020  OT Total Time (min): 26 min    Billable Minutes:Self Care/Home Management 13  Therapeutic Activity 13    OT/JOSÉ: OT     Number of JOSÉ visits since last OT visit: 0    6/12/2024

## 2024-06-12 NOTE — PROGRESS NOTES
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 History was given from patient's daughter. She was undergoing dialysis today and noticed she was lethargic, less alert than usual self. Pt completed dialysis and still not acting herself. EMS was called, fever of a 100.  On chart review, she did have an episode of large volume emesis around 1700.  Per EMS, she had a slightly elevated temp 100.0°F, glucose 300s.     In the ED: UA 2+ leuks, >100 WBC, many bacteria, WBC 17, CT abd/pelvis concerning for cystitis. Given vanc/zosyn    Overview/Hospital Course:  Patient was admitted to Hospital Medicine service for medical management and evaluation of urosepsis. Patient was continued on vanc/zosyn. Vascular Neurology consulted concerning mental status change with the recommendation to initiate ASA 81 QD and to obtain an MRI to r/o new stroke. Imaging pending. Nephrology was consulted for regularly scheduled dialysis. Afternoon of 4/12, patient was unable to tolerate filtration of volume during dialsis 2/2 hypotension. Patient received 500cc bolus and was transferred back to floor after finishing the session without volume removed. Will monitor for signs of volume overload. Tailoring insulin regimen while uptitrating tube feeds to goal rate. Staph Epi in all 4 bottles; ID with recommendation to continue Vanc & de-escalate meropenem to ertapenem on 04/15 through 4/16. Patient completed IV course of UTI coverage. Patient tolerated volume removal well in dialysis  throughout the rest of her hospital stay. Pending discharge to [facility] pending bed availability. Patient without BM with one episode of vomiting overnight 4/17. KUB with bowel gas and low concern for obstruction with no transition point noted. Escalating bowel regimen. Pending placement as of 4/22. Pulm consult placed to evaluate for possible trach downsize & eventual decannulation to assist placement if safe. Trach capping trial per pulm for 48h and will assess for decannulation on Monday. DVT studies negative. Pulm successfully decannulated pt 4/30, IR exchanged TDC. Pending swallow study with SLP and waiting for dispo options. Pt failed swallow study, placement pending. Working with PT on mobilize pt to sitting in a chair to expand placement options. Pt successfully mobilized using sandee lift but required 2 people to do so. Discussing dispo plan with family, likely d/c home if HD, DME needs able to be met. Pending acceptance at outpatient HD center. Ongoing placement difficulties d/t need for accepting HD facility to have sandee lift with 2 personnel to operate. Family meeting to discuss disposition options planned for Thursday 5/23 at 1 PM. SW onboard for placement to [facility] to continue dialysis. Once patient gets accepted at [facility], next step will be to work with daughter on nursing home NH placement. Hyperkalamic, given lokelma x1. Hyperkalemic, shifted K with lokelma x 1 and regular insulin. Nephrology to take on dyalisis today. Optimizing insulin BG and insulin management. SW onboard working on paperwork for long term Medicaid application. CT abdomen with correctly placed PEG tube. Re-started TF and ASA. Pending NH placement. Finished vanc. Will transition Zosyn to augmentin per PEG tube. Pending Daughter to sign [facility] documentation as per .     Interval History: no new fevers. PEG running TF without issues. Switched Zosyn to augmentin per PEG tube.     Review of Systems   Unable to perform  ROS: Patient nonverbal     Objective:     Vital Signs (Most Recent):  Temp: 97.7 °F (36.5 °C) (06/12/24 1121)  Pulse: 77 (06/12/24 1121)  Resp: 19 (06/12/24 1121)  BP: 139/84 (06/12/24 1121)  SpO2: 98 % (06/12/24 1121) Vital Signs (24h Range):  Temp:  [97.7 °F (36.5 °C)-98.6 °F (37 °C)] 97.7 °F (36.5 °C)  Pulse:  [77-97] 77  Resp:  [17-19] 19  SpO2:  [96 %-100 %] 98 %  BP: (105-143)/(68-84) 139/84     Weight: 105.5 kg (232 lb 9.4 oz)  Body mass index is 36.43 kg/m².    Intake/Output Summary (Last 24 hours) at 6/12/2024 1304  Last data filed at 6/12/2024 0545  Gross per 24 hour   Intake --   Output 2 ml   Net -2 ml         Physical Exam  Vitals and nursing note reviewed.   Constitutional:       General: She is not in acute distress.     Appearance: She is not ill-appearing, toxic-appearing or diaphoretic.   HENT:      Head: Normocephalic and atraumatic.   Eyes:      General: No scleral icterus.        Right eye: No discharge.         Left eye: No discharge.      Conjunctiva/sclera: Conjunctivae normal.   Neck:      Comments: Decannulated  Cardiovascular:      Rate and Rhythm: Normal rate and regular rhythm.      Heart sounds: Normal heart sounds.   Pulmonary:      Effort: Pulmonary effort is normal. No respiratory distress.   Abdominal:      General: Abdomen is flat. There is no distension.      Tenderness: There is no abdominal tenderness.      Comments: PEG in place.      Musculoskeletal:      Right lower leg: No edema.      Left lower leg: No edema.   Skin:     General: Skin is warm and dry.   Neurological:      General: No focal deficit present.      Mental Status: She is alert.      Comments: Nonverbal             Significant Labs: All pertinent labs within the past 24 hours have been reviewed.    Significant Imaging: I have reviewed all pertinent imaging results/findings within the past 24 hours.    Assessment/Plan:      * Acute cystitis with hematuria  Resolved       Vulvovaginal candidiasis  Noted 5/29, given  150mg fluconazole x1      Irritant contact dermatitis due friction or contact with other specified body fluids  Wound care following       Debility  Needs:  Wheelchair: patient has a mobility limitation that significantly impairs her ability to participate in one or more mobility related activities of daily living (MRADL's) such as toileting, feeding, dressing, grooming, and bathing in customary locations in the home.  The mobility limitation cannot be sufficiently resolved by the use of a cane or walker.   The use of a manual wheelchair will significantly improve the patient's ability to participate in MRADLS and the patient will use it on regular basis in the home.  Family/pt has expressed her willingness to use a manual wheelchair in the home.  She also has a caregiver who is capable of assisting in mobility.    Mrs Saravia requires a hospital bed due to her requiring positioning of the body in ways not feasible with an ordinary bed to alleviate pain/ is completely immobile /or limited mobility and cannot independently make changes in body position without the use of the bed.  The positioning of the body cannot be sufficiently resolved by the use of pillows and wedges    Patient has a mobility limitation that significantly impairs their ability to participate in one or more mobility related activities of daily living, including toileting. This deficit can be resolved by using a bedside commode. Patient demonstrates mobility limitations that will cause them to be confined to one room at home without bathroom access for up to 30 days. Using a bedside commode will greatly improve the patient's ability to participate in MRADLs       Complication of vascular dialysis catheter  Cath intermittently clogging, not resolving with cath-hedy, going for IR venogram 4/30 with possible exchange. S/p exchange with IR on 4/30      Constipation  Resolved     Moderate malnutrition  Nutrition consulted. Most recent weight and BMI  monitored-     Measurements:  Wt Readings from Last 1 Encounters:   06/11/24 105.5 kg (232 lb 9.4 oz)   Body mass index is 36.43 kg/m².    Patient has been screened and assessed by RD.    Malnutrition Type:  Context: acute illness or injury  Level: moderate    Malnutrition Characteristic Summary:  Weight Loss (Malnutrition): 10% in 6 months  Subcutaneous Fat (Malnutrition): mild depletion  Muscle Mass (Malnutrition): mild depletion  Fluid Accumulation (Malnutrition): mild    Interventions/Recommendations (treatment strategy):  1.  - restarted TF  - previous history of failed swallow studies    Prolonged QT interval  Qtc 526 [04/10/24]      limit Qtc prolonging drugs as able    Spastic hemiplegia of right dominant side as late effect of cerebrovascular disease  Important that patient is able to sit in chair for 4h for dialysis placement needs, even if she requires lift/assistance getting into the chair     This patient has Chronic right hemiplegia due to stroke. Physical therapy services has been scheduled. Continue all standard measures for pressure injury prevention and consult wound care for any wounds (chronic or acute).    ESRD (end stage renal disease) on dialysis  Creatine stable for now. BMP reviewed- noted Estimated Creatinine Clearance: 12.9 mL/min (A) (based on SCr of 6.3 mg/dL (H)). according to latest data. Based on current GFR, CKD stage is end stage.  Monitor UOP and serial BMP and adjust therapy as needed. Renally dose meds. Avoid nephrotoxic medications and procedures.    SW onboard for placement to [facility] to continue dialysis. Once patient gets accepted at [facility], next step will be to work with daughter on [facility] NH placement.     -- HD MWF (~1L fluid removal on average per session)  -- nephro following    PEG (percutaneous endoscopic gastrostomy) status  On PEG tube.Wound care with PEG dressing changes.   - CT abdomen with PEG in place.  - restarted TF         Status post  tracheostomy  Resolved, decannulated 4/30    Acute encephalopathy  Resolved.     Type 2 diabetes mellitus with hyperglycemia, with long-term current use of insulin  Adjusting insulin to account for diabetic TF    Patient's FSGs are controlled on current medication regimen.  Last A1c reviewed-   Lab Results   Component Value Date    HGBA1C 6.2 (H) 05/27/2024     Most recent fingerstick glucose reviewed-   Recent Labs   Lab 06/11/24  2142 06/12/24  0727 06/12/24  1121   POCTGLUCOSE 118* 129* 148*       Current correctional scale  Medium  Maintain anti-hyperglycemic dose as follows-   Antihyperglycemics (From admission, onward)      Start     Stop Route Frequency Ordered    06/09/24 2100  insulin glargine U-100 (Lantus) pen 13 Units         -- SubQ Nightly 06/09/24 0756    06/09/24 1255  insulin aspart U-100 pen 0-10 Units         -- SubQ Before meals & nightly PRN 06/09/24 1155          Hold Oral hypoglycemics while patient is in the hospital.  POCT glucose checks   -tube feeds     Hyponatremia  Patient has hyponatremia which is uncontrolled,We will aim to correct the sodium by 4-6mEq in 24 hours. We will monitor sodium Daily. The hyponatremia is due to ESRD. Discussed with nephrology, dialysate bath adjusted to account for and correct hyponatremia.  Recent Labs   Lab 06/12/24  0405   *       - Daily morning CMP  - Continue to montior    Sepsis  This patient does have evidence of infective focus  My overall impression is sepsis.  Source: Skin and Soft Tissue (location Abdominal)  Antibiotics given-   Antibiotics (72h ago, onward)      Start     Stop Route Frequency Ordered    06/12/24 2100  amoxicillin-pot clavulanate 250-62.5 mg/5ml suspension 500 mg         06/19/24 2059 PER G TUBE Nightly 06/12/24 0956    06/09/24 0915  mupirocin 2 % ointment         06/14/24 0859 Nasl 2 times daily 06/09/24 0805          Latest lactate reviewed-  Recent Labs   Lab 06/10/24  1114   LACTATE 3.2*       Organ dysfunction  indicated by Encephalopathy    Fluid challenge Contraindicated- Fluid bolus is contraindicated in this patient due to End Stage Renal Disease     Post- resuscitation assessment No - Post resuscitation assessment not needed       Will Not start Pressors- Levophed for MAP of 65  Source control achieved by: Ryleesyholly     Plan:  - Concerns septic source may be PEG site with associated purulent wound despite Wound Care attempts to protect with barriers. PEG placed by General Surgery 2/2024. General Surgery consulted regarding PEG site ordered CT abdomen. Currently in place.   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 Pt underwent extensive course, currently still healing from this, but no longer has wound vac. Pt's wound currently covered with bandage. Picture in media tabs    Plan  Wound care        VTE Risk Mitigation (From admission, onward)           Ordered     heparin (porcine) injection 1,000 Units  As needed (PRN)         06/10/24 0035     heparin (porcine) injection 5,000 Units  Every 8 hours         05/29/24 0823     heparin (porcine) injection 3,000 Units  As needed (PRN)         04/17/24 0825                    Discharge Planning   ZEKE: 6/12/2024     Code Status: Full Code   Is the patient medically ready for discharge?: No    Reason for patient still in hospital (select all that apply): Patient trending condition  Discharge Plan A: New Nursing Home placement - halfway care facility          Steven Miranda MD  Department of Hospital Medicine   [facility] - Telemetry Stepdown

## 2024-06-12 NOTE — PLAN OF CARE
Problem: Infection  Goal: Absence of Infection Signs and Symptoms  6/12/2024 1818 by Stacia Arias LPN  Outcome: Progressing  6/12/2024 1813 by Stacia Arias LPN  Outcome: Progressing     Problem: Skin Injury Risk Increased  Goal: Skin Health and Integrity  6/12/2024 1818 by Stacia Arias LPN  Outcome: Progressing  6/12/2024 1813 by Stacia Arias LPN  Outcome: Progressing     Problem: Adult Inpatient Plan of Care  Goal: Plan of Care Review  6/12/2024 1818 by Stacia Arias LPN  Outcome: Progressing  6/12/2024 1813 by Stacia Arias LPN  Outcome: Progressing

## 2024-06-12 NOTE — NURSING
Nurses Note -- 4 Eyes      6/12/2024   7:25 PM      Skin assessed during: Q Shift Change      [] No Altered Skin Integrity Present    []Prevention Measures Documented      [x] Yes- Altered Skin Integrity Present or Discovered   [] LDA Added if Not in Epic (Describe Wound)   [] New Altered Skin Integrity was Present on Admit and Documented in LDA   [] Wound Image Taken    Wound Care Consulted? Yes    Attending Nurse:  ELISABETH Puente    Second RN/Staff Member:  HEATH Palomo

## 2024-06-12 NOTE — SUBJECTIVE & OBJECTIVE
Interval History: no new fevers. PEG running TF without issues. Switched Zosyn to augmentin per PEG tube.     Review of Systems   Unable to perform ROS: Patient nonverbal     Objective:     Vital Signs (Most Recent):  Temp: 97.7 °F (36.5 °C) (06/12/24 1121)  Pulse: 77 (06/12/24 1121)  Resp: 19 (06/12/24 1121)  BP: 139/84 (06/12/24 1121)  SpO2: 98 % (06/12/24 1121) Vital Signs (24h Range):  Temp:  [97.7 °F (36.5 °C)-98.6 °F (37 °C)] 97.7 °F (36.5 °C)  Pulse:  [77-97] 77  Resp:  [17-19] 19  SpO2:  [96 %-100 %] 98 %  BP: (105-143)/(68-84) 139/84     Weight: 105.5 kg (232 lb 9.4 oz)  Body mass index is 36.43 kg/m².    Intake/Output Summary (Last 24 hours) at 6/12/2024 1304  Last data filed at 6/12/2024 0545  Gross per 24 hour   Intake --   Output 2 ml   Net -2 ml         Physical Exam  Vitals and nursing note reviewed.   Constitutional:       General: She is not in acute distress.     Appearance: She is not ill-appearing, toxic-appearing or diaphoretic.   HENT:      Head: Normocephalic and atraumatic.   Eyes:      General: No scleral icterus.        Right eye: No discharge.         Left eye: No discharge.      Conjunctiva/sclera: Conjunctivae normal.   Neck:      Comments: Decannulated  Cardiovascular:      Rate and Rhythm: Normal rate and regular rhythm.      Heart sounds: Normal heart sounds.   Pulmonary:      Effort: Pulmonary effort is normal. No respiratory distress.   Abdominal:      General: Abdomen is flat. There is no distension.      Tenderness: There is no abdominal tenderness.      Comments: PEG in place.      Musculoskeletal:      Right lower leg: No edema.      Left lower leg: No edema.   Skin:     General: Skin is warm and dry.   Neurological:      General: No focal deficit present.      Mental Status: She is alert.      Comments: Nonverbal             Significant Labs: All pertinent labs within the past 24 hours have been reviewed.    Significant Imaging: I have reviewed all pertinent imaging  results/findings within the past 24 hours.

## 2024-06-12 NOTE — PT/OT/SLP PROGRESS
Speech Language Pathology      Sarah NADER Saravia  MRN: [id number]    Patient not seen today secondary to dialysis (pt also appears to have plans to discharge to NH today.). Will follow-up 6/13 if not discharged. Cont SLP services upon discharge.

## 2024-06-12 NOTE — ASSESSMENT & PLAN NOTE
Patient has hyponatremia which is uncontrolled,We will aim to correct the sodium by 4-6mEq in 24 hours. We will monitor sodium Daily. The hyponatremia is due to ESRD. Discussed with nephrology, dialysate bath adjusted to account for and correct hyponatremia.  Recent Labs   Lab 06/12/24  0405   *       - Daily morning CMP  - Continue to montior

## 2024-06-12 NOTE — PT/OT/SLP PROGRESS
Physical Therapy      Patient Name:  Sarah Saravia   MRN:  [id number]    Patient not seen today secondary to Other (Comment) (2 attempts for tx session: 1. OT just finished working with pt at 10:15 am; 2. Pt URIEL away at  at 2:27 pm). Will follow-up on next scheduled visit.

## 2024-06-12 NOTE — ASSESSMENT & PLAN NOTE
This patient does have evidence of infective focus  My overall impression is sepsis.  Source: Skin and Soft Tissue (location Abdominal)  Antibiotics given-   Antibiotics (72h ago, onward)      Start     Stop Route Frequency Ordered    06/12/24 2100  amoxicillin-pot clavulanate 250-62.5 mg/5ml suspension 500 mg         06/19/24 2059 PER G TUBE Nightly 06/12/24 0956    06/09/24 0915  mupirocin 2 % ointment         06/14/24 0859 Nasl 2 times daily 06/09/24 0805          Latest lactate reviewed-  Recent Labs   Lab 06/10/24  1114   LACTATE 3.2*       Organ dysfunction indicated by Encephalopathy    Fluid challenge Contraindicated- Fluid bolus is contraindicated in this patient due to End Stage Renal Disease     Post- resuscitation assessment No - Post resuscitation assessment not needed       Will Not start Pressors- Levophed for MAP of 65  Source control achieved by: Shanique     Plan:  - Concerns septic source may be PEG site with associated purulent wound despite Wound Care attempts to protect with barriers. PEG placed by General Surgery 2/2024. General Surgery consulted regarding PEG site ordered CT abdomen. Currently in place.   - switched to 7 day course augmentin PEG tube   - Blood cultures obtained, follow through maturation  - Chest X Ray unrevealing

## 2024-06-12 NOTE — ASSESSMENT & PLAN NOTE
Adjusting insulin to account for diabetic TF    Patient's FSGs are controlled on current medication regimen.  Last A1c reviewed-   Lab Results   Component Value Date    HGBA1C 6.2 (H) 05/27/2024     Most recent fingerstick glucose reviewed-   Recent Labs   Lab 06/11/24  2142 06/12/24  0727 06/12/24  1121   POCTGLUCOSE 118* 129* 148*       Current correctional scale  Medium  Maintain anti-hyperglycemic dose as follows-   Antihyperglycemics (From admission, onward)      Start     Stop Route Frequency Ordered    06/09/24 2100  insulin glargine U-100 (Lantus) pen 13 Units         -- SubQ Nightly 06/09/24 0756    06/09/24 1255  insulin aspart U-100 pen 0-10 Units         -- SubQ Before meals & nightly PRN 06/09/24 1155          Hold Oral hypoglycemics while patient is in the hospital.  POCT glucose checks   -tube feeds

## 2024-06-12 NOTE — PROGRESS NOTES
[facility] NOTE     Patient currently on hemodialysis for removal of uremic toxins .     Patient seen and evaluated on hemodialysis, tolerating treatment, see HD flowsheet for vitals and assessments.      No Hypotension, chest pain, shortness of breath, cramping, nausea or vomiting.     Target UF: 1 L as tolerated, keep MAP >65.  Possible transfer to [facility] pending paperwork completion by daughter.   Hgb 8.8 - on EPO  Phos 5.3, will consider binders with TF  Consider renal diet restrictions; please limit daily intake to 32 oz per day.   No lab stick/BP intake on access site  Continue to monitor intake and output, daily weights   Please avoid gadolinium, fleets, phos-based laxatives, NSAIDs  Will follow closely and continue dialysis treatments while in-patient    SHERLY Gregory DNP, APRN, FNP-C  Department of Nephrology  [facility]  Pager: [phone]

## 2024-06-12 NOTE — ASSESSMENT & PLAN NOTE
On PEG tube.Wound care with PEG dressing changes.   - CT abdomen with PEG in place.  - restarted TF

## 2024-06-12 NOTE — PLAN OF CARE
Call placed to [facility] ([phone]) to check on the status of patient's admission. This CM was asked to callback the admissions department as they were in a meeting. Requested NH orders, will continue to follow.    10:20AM  Call placed to [facility] and this CM was informed that patient's daughter Aleks has still not completed admission paperwork. IM4 team made aware of above.    Sara Loredo RN  Ext [phone]

## 2024-06-13 LAB
ALBUMIN SERPL BCP-MCNC: 2.7 G/DL (ref 3.5–5.2)
ALP SERPL-CCNC: 74 U/L (ref 55–135)
ALT SERPL W/O P-5'-P-CCNC: 32 U/L (ref 10–44)
ANION GAP SERPL CALC-SCNC: 13 MMOL/L (ref 8–16)
AST SERPL-CCNC: 29 U/L (ref 10–40)
BASOPHILS # BLD AUTO: 0.06 K/UL (ref 0–0.2)
BASOPHILS NFR BLD: 0.3 % (ref 0–1.9)
BILIRUB SERPL-MCNC: 0.4 MG/DL (ref 0.1–1)
BUN SERPL-MCNC: 17 MG/DL (ref 6–20)
CALCIUM SERPL-MCNC: 8.9 MG/DL (ref 8.7–10.5)
CHLORIDE SERPL-SCNC: 93 MMOL/L (ref 95–110)
CO2 SERPL-SCNC: 24 MMOL/L (ref 23–29)
CREAT SERPL-MCNC: 4.3 MG/DL (ref 0.5–1.4)
DIFFERENTIAL METHOD BLD: ABNORMAL
EOSINOPHIL # BLD AUTO: 0.1 K/UL (ref 0–0.5)
EOSINOPHIL NFR BLD: 0.3 % (ref 0–8)
ERYTHROCYTE [DISTWIDTH] IN BLOOD BY AUTOMATED COUNT: 15.8 % (ref 11.5–14.5)
EST. GFR  (NO RACE VARIABLE): 11.7 ML/MIN/1.73 M^2
GLUCOSE SERPL-MCNC: 139 MG/DL (ref 70–110)
HCT VFR BLD AUTO: 27.1 % (ref 37–48.5)
HGB BLD-MCNC: 8.3 G/DL (ref 12–16)
IMM GRANULOCYTES # BLD AUTO: 0.15 K/UL (ref 0–0.04)
IMM GRANULOCYTES NFR BLD AUTO: 0.7 % (ref 0–0.5)
LACTATE SERPL-SCNC: 1.2 MMOL/L (ref 0.5–2.2)
LYMPHOCYTES # BLD AUTO: 0.9 K/UL (ref 1–4.8)
LYMPHOCYTES NFR BLD: 4.1 % (ref 18–48)
MAGNESIUM SERPL-MCNC: 1.8 MG/DL (ref 1.6–2.6)
MCH RBC QN AUTO: 25.4 PG (ref 27–31)
MCHC RBC AUTO-ENTMCNC: 30.6 G/DL (ref 32–36)
MCV RBC AUTO: 83 FL (ref 82–98)
MONOCYTES # BLD AUTO: 1.7 K/UL (ref 0.3–1)
MONOCYTES NFR BLD: 7.4 % (ref 4–15)
NEUTROPHILS # BLD AUTO: 19.8 K/UL (ref 1.8–7.7)
NEUTROPHILS NFR BLD: 87.2 % (ref 38–73)
NRBC BLD-RTO: 0 /100 WBC
OHS QRS DURATION: 134 MS
OHS QTC CALCULATION: 598 MS
PHOSPHATE SERPL-MCNC: 3.1 MG/DL (ref 2.7–4.5)
PLATELET # BLD AUTO: 240 K/UL (ref 150–450)
PMV BLD AUTO: 10.3 FL (ref 9.2–12.9)
POCT GLUCOSE: 117 MG/DL (ref 70–110)
POCT GLUCOSE: 132 MG/DL (ref 70–110)
POCT GLUCOSE: 146 MG/DL (ref 70–110)
POCT GLUCOSE: 148 MG/DL (ref 70–110)
POTASSIUM SERPL-SCNC: 3.1 MMOL/L (ref 3.5–5.1)
POTASSIUM SERPL-SCNC: 3.2 MMOL/L (ref 3.5–5.1)
POTASSIUM SERPL-SCNC: 3.5 MMOL/L (ref 3.5–5.1)
POTASSIUM SERPL-SCNC: 4.1 MMOL/L (ref 3.5–5.1)
POTASSIUM SERPL-SCNC: 4.2 MMOL/L (ref 3.5–5.1)
PROCALCITONIN SERPL IA-MCNC: 15.65 NG/ML
PROT SERPL-MCNC: 7.1 G/DL (ref 6–8.4)
RBC # BLD AUTO: 3.27 M/UL (ref 4–5.4)
SODIUM SERPL-SCNC: 130 MMOL/L (ref 136–145)
VANCOMYCIN SERPL-MCNC: 14.1 UG/ML
WBC # BLD AUTO: 22.66 K/UL (ref 3.9–12.7)

## 2024-06-13 PROCEDURE — 25000003 PHARM REV CODE 250

## 2024-06-13 PROCEDURE — 84132 ASSAY OF SERUM POTASSIUM: CPT

## 2024-06-13 PROCEDURE — 25000003 PHARM REV CODE 250: Performed by: FAMILY MEDICINE

## 2024-06-13 PROCEDURE — 36415 COLL VENOUS BLD VENIPUNCTURE: CPT | Performed by: STUDENT IN AN ORGANIZED HEALTH CARE EDUCATION/TRAINING PROGRAM

## 2024-06-13 PROCEDURE — 27000207 HC ISOLATION

## 2024-06-13 PROCEDURE — 25000003 PHARM REV CODE 250: Performed by: STUDENT IN AN ORGANIZED HEALTH CARE EDUCATION/TRAINING PROGRAM

## 2024-06-13 PROCEDURE — 25000003 PHARM REV CODE 250: Performed by: NURSE PRACTITIONER

## 2024-06-13 PROCEDURE — 97530 THERAPEUTIC ACTIVITIES: CPT | Mod: CQ

## 2024-06-13 PROCEDURE — 20600001 HC STEP DOWN PRIVATE ROOM

## 2024-06-13 PROCEDURE — 25000242 PHARM REV CODE 250 ALT 637 W/ HCPCS: Performed by: STUDENT IN AN ORGANIZED HEALTH CARE EDUCATION/TRAINING PROGRAM

## 2024-06-13 PROCEDURE — 83735 ASSAY OF MAGNESIUM: CPT | Performed by: STUDENT IN AN ORGANIZED HEALTH CARE EDUCATION/TRAINING PROGRAM

## 2024-06-13 PROCEDURE — 80053 COMPREHEN METABOLIC PANEL: CPT

## 2024-06-13 PROCEDURE — 80202 ASSAY OF VANCOMYCIN: CPT | Performed by: STUDENT IN AN ORGANIZED HEALTH CARE EDUCATION/TRAINING PROGRAM

## 2024-06-13 PROCEDURE — 36415 COLL VENOUS BLD VENIPUNCTURE: CPT

## 2024-06-13 PROCEDURE — 84100 ASSAY OF PHOSPHORUS: CPT | Performed by: STUDENT IN AN ORGANIZED HEALTH CARE EDUCATION/TRAINING PROGRAM

## 2024-06-13 PROCEDURE — 63600175 PHARM REV CODE 636 W HCPCS

## 2024-06-13 PROCEDURE — 87040 BLOOD CULTURE FOR BACTERIA: CPT

## 2024-06-13 PROCEDURE — 83605 ASSAY OF LACTIC ACID: CPT

## 2024-06-13 PROCEDURE — 63600175 PHARM REV CODE 636 W HCPCS: Performed by: FAMILY MEDICINE

## 2024-06-13 PROCEDURE — 99223 1ST HOSP IP/OBS HIGH 75: CPT | Mod: ,,, | Performed by: INTERNAL MEDICINE

## 2024-06-13 PROCEDURE — 94761 N-INVAS EAR/PLS OXIMETRY MLT: CPT

## 2024-06-13 PROCEDURE — 84145 PROCALCITONIN (PCT): CPT

## 2024-06-13 PROCEDURE — 85025 COMPLETE CBC W/AUTO DIFF WBC: CPT | Performed by: STUDENT IN AN ORGANIZED HEALTH CARE EDUCATION/TRAINING PROGRAM

## 2024-06-13 RX ORDER — SODIUM CHLORIDE 9 MG/ML
INJECTION, SOLUTION INTRAVENOUS ONCE
Status: COMPLETED | OUTPATIENT
Start: 2024-06-14 | End: 2024-06-17

## 2024-06-13 RX ORDER — POLYETHYLENE GLYCOL 3350 17 G/17G
17 POWDER, FOR SOLUTION ORAL DAILY
Status: DISCONTINUED | OUTPATIENT
Start: 2024-06-14 | End: 2024-06-19

## 2024-06-13 RX ORDER — MEROPENEM AND SODIUM CHLORIDE 500 MG/50ML
500 INJECTION, SOLUTION INTRAVENOUS
Status: DISCONTINUED | OUTPATIENT
Start: 2024-06-13 | End: 2024-06-13

## 2024-06-13 RX ADMIN — POLYETHYLENE GLYCOL 3350 17 G: 17 POWDER, FOR SOLUTION ORAL at 09:06

## 2024-06-13 RX ADMIN — HEPARIN SODIUM 5000 UNITS: 5000 INJECTION INTRAVENOUS; SUBCUTANEOUS at 03:06

## 2024-06-13 RX ADMIN — OXYCODONE HYDROCHLORIDE 5 MG: 5 SOLUTION ORAL at 04:06

## 2024-06-13 RX ADMIN — Medication 500 MG: at 09:06

## 2024-06-13 RX ADMIN — PIPERACILLIN SODIUM AND TAZOBACTAM SODIUM 4.5 G: 4; .5 INJECTION, POWDER, FOR SOLUTION INTRAVENOUS at 03:06

## 2024-06-13 RX ADMIN — PANTOPRAZOLE SODIUM 40 MG: 40 GRANULE, DELAYED RELEASE ORAL at 09:06

## 2024-06-13 RX ADMIN — ACETAMINOPHEN 650 MG: 650 SOLUTION ORAL at 01:06

## 2024-06-13 RX ADMIN — HEPARIN SODIUM 5000 UNITS: 5000 INJECTION INTRAVENOUS; SUBCUTANEOUS at 09:06

## 2024-06-13 RX ADMIN — HEPARIN SODIUM 5000 UNITS: 5000 INJECTION INTRAVENOUS; SUBCUTANEOUS at 05:06

## 2024-06-13 RX ADMIN — ATORVASTATIN CALCIUM 40 MG: 40 TABLET, FILM COATED ORAL at 09:06

## 2024-06-13 RX ADMIN — SEVELAMER CARBONATE 0.8 G: 800 POWDER, FOR SUSPENSION ORAL at 09:06

## 2024-06-13 RX ADMIN — METOPROLOL TARTRATE 25 MG: 25 TABLET, FILM COATED ORAL at 09:06

## 2024-06-13 RX ADMIN — METOPROLOL TARTRATE 25 MG: 25 TABLET, FILM COATED ORAL at 08:06

## 2024-06-13 RX ADMIN — MEROPENEM 500 MG: 500 INJECTION INTRAVENOUS at 11:06

## 2024-06-13 RX ADMIN — INSULIN GLARGINE 13 UNITS: 100 INJECTION, SOLUTION SUBCUTANEOUS at 08:06

## 2024-06-13 RX ADMIN — SEVELAMER CARBONATE 0.8 G: 800 POWDER, FOR SUSPENSION ORAL at 05:06

## 2024-06-13 RX ADMIN — ASPIRIN 81 MG CHEWABLE TABLET 81 MG: 81 TABLET CHEWABLE at 09:06

## 2024-06-13 RX ADMIN — Medication 500 MG: at 08:06

## 2024-06-13 RX ADMIN — MUPIROCIN: 20 OINTMENT TOPICAL at 09:06

## 2024-06-13 RX ADMIN — MUPIROCIN: 20 OINTMENT TOPICAL at 08:06

## 2024-06-13 RX ADMIN — SEVELAMER CARBONATE 0.8 G: 800 POWDER, FOR SUSPENSION ORAL at 12:06

## 2024-06-13 NOTE — PLAN OF CARE
Call placed to [facility] ([phone]) to check on the status of patient's admission This CM was informed that patient's daughter Aleks has signed admission paperwork and they can admit her tomorrow after she receives dialysis. Requested [facility] orders. Will continue.to follow.    Sara Loredo RN  Ext [phone]

## 2024-06-13 NOTE — CONSULTS
[facility] - Telemetry Stepdown  Infectious Disease  Consult Note    Patient Name: Sarah Saravia  MRN: [id number]  Admission Date: 4/10/2024  Hospital Length of Stay: 64 days  Attending Physician: Virgil iFerro III*  Primary Care Provider: Deanna, Primary Doctor     Isolation Status: Contact    Patient information was obtained from relative(s) and ER records.      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 Hospitalization was c/b urosepsis, in which pt completed carbapenem course on 4/16. Trach was capped and later pt was decannulated on 4/30 which was successful. Pt remained inpatient while awaiting dispo plans, as well as requiring mgmt for lyte imbalances and dysphagia.   Pt was restarted on antibiotics on 6/10 per primary team as pt began to be tachycardic, slight leukocytosis, and low grade fever. General surgery was consulted d/t concerns with g tube. Recommended CT AP which was overall unremarkable. GTube in place. No intraabdominal collections noted.  Now with increasing white count and fever. Escalated to meropenem. Blood cultures obtained, pending. Chest xray with hypoareated lungs, as well as accentuation of pulmonary vascular and interstitial markings. Without consolidation.     Pt continues on meropenem. White count 22k. Procal 15. Lactic acid 1.2. ID consulted d/t concerns for leukocytosis.     Recommendations:   - obtain CT chest to assess for evidence of developing pneumonitis or pneumonia given recent vomiting, concerns for aspiration  - continue broad spectrum coverage w/ vanc and meropenem  - follow up all pending cultures  - check RIP          Thank  you for your consult. I will follow-up with patient. Please contact us if you have any additional questions.    Hailey Pedraza DO  Infectious Disease    Time: 75 minutes   50% of time spent on face-to-face counseling and coordination of care. Counseling included review of test results, diagnosis, and treatment plan with patient and/or family.      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 Reportedly patient was less responsive than her baseline had an episode of emesis, in the ED she had a CT abdomen/pelvis concerning for cystitis as well as a UA (obtained via straight cath) concerning for a UTI. She was initially started on vancomycin and Zosyn later broadened to meropenem. On presentation her labs were notable for leukocytosis of 17, as well as a lactic acidosis of 3.9 that has improved to 2.4 with the administration of 1 L of IV fluids, we are being cautious with fluid repletion given her end-stage renal disease and oliguric status. ID is now consulted for abrupt increase in WBC to 22 w/ associated fever. Daughter at bedside notes that patient had a few episodes of vomiting 2 days ago after initiation of tube feeds, and feels her breathing pattern is different today. Patient is unable to answer questions. CXR  w/ increased vascular congestion. She has been restarted on broad spectrum abx.     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    Hypermagnesemia 04/11/2024    POA, Mg 3.2  Daily chem       Morbid obesity     Necrotizing fasciitis        Past Surgical History:   Procedure Laterality Date    CLOSURE OF WOUND Right 2/22/2024    Procedure: CLOSURE, WOUND;   Surgeon: Steve Cole MD;  Location: [facility] OR 2ND FLR;  Service: General;  Laterality: Right;    ESOPHAGOGASTRODUODENOSCOPY W/ PEG N/A 2/22/2024    Procedure: EGD, WITH PEG TUBE INSERTION;  Surgeon: Steve Cole MD;  Location: [facility] OR 2ND FLR;  Service: General;  Laterality: N/A;    INCISION AND DRAINAGE OF PERIRECTAL REGION N/A 1/29/2024    Procedure: INCISION AND DRAINAGE, PERIRECTAL REGION;  Surgeon: Axel Ramsay MD;  Location: [facility] OR;  Service: General;  Laterality: N/A;    LUMBAR PUNCTURE N/A 2/16/2024    Procedure: Lumbar Puncture;  Surgeon: Macy Boykin;  Location: [facility];  Service: Anesthesiology;  Laterality: N/A;    PLACEMENT, TRIALYSIS CATH Right 1/31/2024    Procedure: INSERTION, CATHETER, TRIPLE LUMEN, HEMODIALYSIS, TEMPORARY;  Surgeon: Alvin Junior MD;  Location: [facility] OR;  Service: General;  Laterality: Right;    REPLACEMENT OF WOUND VACUUM-ASSISTED CLOSURE DEVICE Right 2/12/2024    Procedure: REPLACEMENT, WOUND VAC;  Surgeon: Mundo Carmona MD;  Location: [facility] OR 2ND FLR;  Service: General;  Laterality: Right;    REPLACEMENT OF WOUND VACUUM-ASSISTED CLOSURE DEVICE N/A 2/15/2024    Procedure: REPLACEMENT, WOUND VAC;  Surgeon: Steve Cole MD;  Location: [facility] OR 2ND FLR;  Service: General;  Laterality: N/A;    REPLACEMENT OF WOUND VACUUM-ASSISTED CLOSURE DEVICE Right 2/19/2024    Procedure: REPLACEMENT, WOUND VAC;  Surgeon: Steve Cole MD;  Location: [facility] OR 2ND FLR;  Service: General;  Laterality: Right;  RLE/groin    REPLACEMENT OF WOUND VACUUM-ASSISTED CLOSURE DEVICE Right 2/22/2024    Procedure: REPLACEMENT, WOUND VAC;  Surgeon: Steve Cole MD;  Location: [facility] OR 2ND FLR;  Service: General;  Laterality: Right;    TRACHEOSTOMY N/A 2/22/2024    Procedure: CREATION, TRACHEOSTOMY;  Surgeon: Steve Cole MD;  Location: [facility] OR 2ND FLR;  Service: General;  Laterality: N/A;    WOUND DEBRIDEMENT Bilateral 2/2/2024    Procedure: DEBRIDEMENT, WOUND;  Surgeon: Steve Cole,  MD;  Location: [facility] OR 2ND FLR;  Service: General;  Laterality: Bilateral;  Bilateral groin  Possible wound vac placement    WOUND DEBRIDEMENT Right 2/6/2024    Procedure: DEBRIDEMENT, WOUND, replace wound vac, possible closure;  Surgeon: Steve Cole MD;  Location: [facility] OR 2ND FLR;  Service: General;  Laterality: Right;  RLE    WOUND DEBRIDEMENT Right 2/9/2024    Procedure: DEBRIDEMENT, WOUND w wound vac change;  Surgeon: Steve Cole MD;  Location: [facility] OR 2ND FLR;  Service: General;  Laterality: Right;  RLE    WOUND DEBRIDEMENT Right 2/12/2024    Procedure: R thigh wound debridement;  Surgeon: Mundo Carmona MD;  [facility];  Service: General;  Laterality: Right;    WOUND EXPLORATION Right 1/31/2024    Procedure: IRRIGATION & DEBRIDEMENT, WOUND DEBRIDEMENT;  Surgeon: Alvin Junior MD;  Location: [facility] OR;  Service: General;  Laterality: Right;       Review of patient's allergies indicates:  No Known Allergies    Medications:  Medications Prior to Admission   Medication Sig    ascorbic acid, vitamin C, (VITAMIN C) 500 MG tablet 500 mg by Per G Tube route 2 (two) times daily.    pantoprazole sodium (PANTOPRAZOLE ORAL) 40 mg once daily. Liquid via gtube    [DISCONTINUED] amLODIPine (NORVASC) 10 MG tablet 10 mg by Per G Tube route once daily. 0900    [DISCONTINUED] carvediloL (COREG) 25 MG tablet 25 mg by Per G Tube route 2 (two) times daily with meals.    [DISCONTINUED] chlorhexidine (PERIDEX) 0.12 % solution Use as directed 15 mLs in the mouth or throat 2 (two) times daily.    [DISCONTINUED] heparin sodium,porcine (HEPARIN, PORCINE,) 5,000 unit/mL injection Inject 7,500 Units into the skin every 8 (eight) hours.    [DISCONTINUED] insulin aspart U-100 (NOVOLOG) 100 unit/mL injection Inject 0-10 Units into the skin as needed for High Blood Sugar. Moderate correction dose sliding scale    [DISCONTINUED] insulin detemir U-100 (LEVEMIR) 100 unit/mL injection Inject 25 Units into the skin 2  (two) times a day.    [DISCONTINUED] psyllium (KONSYL) Powd 1 packet by Per G Tube route once daily.    [DISCONTINUED] sevelamer carbonate (RENVELA) 2.4 gram PwPk 2,400 mg by Per G Tube route 3 (three) times daily.    [DISCONTINUED] zinc sulfate (ZINCATE) 50 mg zinc (220 mg) capsule 220 mg by Per G Tube route once daily.     Antibiotics (From admission, onward)      Start     Stop Route Frequency Ordered    06/13/24 1115  meropenem (MERREM) 500 mg in sodium chloride 0.9 % 100 mL IVPB (MB+)         -- IV Every 12 hours (non-standard times) 06/13/24 1007    06/09/24 0915  mupirocin 2 % ointment         06/14/24 0859 Nasl 2 times daily 06/09/24 0805          Antifungals (From admission, onward)      None          Antivirals (From admission, onward)      None             Immunization History   Administered Date(s) Administered    PPD Test 08/16/2022, 04/23/2024, 05/16/2024, 05/29/2024    Td (ADULT) 11/16/2005       Family History    None       Social History     Socioeconomic History    Marital status: Single   Tobacco Use    Smoking status: Unknown     Social Determinants of Health     Financial Resource Strain: Patient Unable To Answer (3/14/2024)    Overall Financial Resource Strain (CARDIA)     Difficulty of Paying Living Expenses: Patient unable to answer   Recent Concern: Financial Resource Strain - High Risk (3/9/2024)    Overall Financial Resource Strain (CARDIA)     Difficulty of Paying Living Expenses: Very hard   Food Insecurity: Patient Unable To Answer (3/14/2024)    Hunger Vital Sign     Worried About Running Out of Food in the Last Year: Patient unable to answer     Ran Out of Food in the Last Year: Patient unable to answer   Transportation Needs: Patient Unable To Answer (3/14/2024)    PRAPARE - Transportation     Lack of Transportation (Medical): Patient unable to answer     Lack of Transportation (Non-Medical): Patient unable to answer   Physical Activity: Inactive (3/13/2024)    Exercise Vital Sign      Days of Exercise per Week: 0 days     Minutes of Exercise per Session: 0 min   Stress: Patient Unable To Answer (3/14/2024)    [employer] - Occupational Stress Questionnaire     Feeling of Stress : Patient unable to answer   Housing Stability: Patient Unable To Answer (3/14/2024)    Housing Stability Vital Sign     Unable to Pay for Housing in the Last Year: Patient unable to answer     Number of Places Lived in the Last Year: 1     Unstable Housing in the Last Year: Patient unable to answer   Recent Concern: Housing Stability - High Risk (3/9/2024)    Housing Stability Vital Sign     Unable to Pay for Housing in the Last Year: Yes     Unstable Housing in the Last Year: No     Review of Systems   Unable to perform ROS: Acuity of condition     Objective:     Vital Signs (Most Recent):  Temp: 99.2 °F (37.3 °C) (06/13/24 1121)  Pulse: 91 (06/13/24 1142)  Resp: 19 (06/13/24 1121)  BP: 119/73 (06/13/24 1121)  SpO2: 98 % (06/13/24 1121) Vital Signs (24h Range):  Temp:  [97.4 °F (36.3 °C)-101.4 °F (38.6 °C)] 99.2 °F (37.3 °C)  Pulse:  [] 91  Resp:  [16-19] 19  SpO2:  [97 %-99 %] 98 %  BP: (107-153)/(73-92) 119/73     Weight: 105.5 kg (232 lb 9.4 oz)  Body mass index is 36.43 kg/m².    Estimated Creatinine Clearance: 18.9 mL/min (A) (based on SCr of 4.3 mg/dL (H)).     Physical Exam  Constitutional:       General: She is not in acute distress.     Appearance: She is well-developed. She is ill-appearing. She is not diaphoretic.   HENT:      Head: Normocephalic and atraumatic.      Right Ear: External ear normal.      Left Ear: External ear normal.      Nose: Nose normal.   Eyes:      General: No scleral icterus.        Right eye: No discharge.         Left eye: No discharge.      Extraocular Movements: Extraocular movements intact.      Conjunctiva/sclera: Conjunctivae normal.   Pulmonary:      Effort: Pulmonary effort is normal. No respiratory distress.      Breath sounds: No stridor.       Comments: On room air  Abdominal:      General: Abdomen is flat. There is no distension.      Palpations: Abdomen is soft.      Comments: PEG tube w/o issue   Musculoskeletal:         General: Normal range of motion.   Skin:     Findings: No erythema or rash.      Comments: R chest HD line w/o evidence of infection   Neurological:      Mental Status: She is alert. Mental status is at baseline.      Comments: Awake and alert, does not verbalize          Significant Labs: CBC:   Recent Labs   Lab 06/12/24 0405 06/13/24 0622   WBC 5.79 22.66*   HGB 8.8* 8.3*   HCT 29.5* 27.1*    240     CMP:   Recent Labs   Lab 06/12/24 0405 06/12/24  0837 06/12/24 2008 06/12/24 2350 06/13/24  0850   *  --   --   --  130*   K 4.0   < > 4.3 3.1* 4.2  4.1   CL 91*  --   --   --  93*   CO2 21*  --   --   --  24     --   --   --  139*   BUN 38*  --   --   --  17   CREATININE 6.3*  --   --   --  4.3*   CALCIUM 9.5  --   --   --  8.9   PROT 7.0  --   --   --  7.1   ALBUMIN 2.7*  --   --   --  2.7*   BILITOT 0.3  --   --   --  0.4   ALKPHOS 67  --   --   --  74   AST 28  --   --   --  29   ALT 31  --   --   --  32   ANIONGAP 15  --   --   --  13    < > = values in this interval not displayed.     Microbiology Results (last 7 days)       Procedure Component Value Units Date/Time    Blood culture [[id number]] Collected: 06/13/24 0622    Order Status: Completed Specimen: Blood Updated: 06/13/24 1315     Blood Culture, Routine No Growth to date    Blood culture [[id number]] Collected: 06/13/24 0622    Order Status: Completed Specimen: Blood Updated: 06/13/24 1315     Blood Culture, Routine No Growth to date    Blood culture [[id number]] Collected: 06/10/24 0719    Order Status: Completed Specimen: Blood Updated: 06/13/24 1012     Blood Culture, Routine No Growth to date      No Growth to date      No Growth to date      No Growth to date    Narrative:      Lft hand    Blood culture [[id number]] Collected: 06/10/24 0719     Order Status: Completed Specimen: Blood Updated: 06/13/24 1012     Blood Culture, Routine No Growth to date      No Growth to date      No Growth to date      No Growth to date    Narrative:      Lft forearm            Significant Imaging: I have reviewed all pertinent imaging results/findings within the past 24 hours.

## 2024-06-13 NOTE — SUBJECTIVE & OBJECTIVE
Interval History: PEG tube in place, no evidence of pus or blood coming from tube. New episode of fever overnight while on augmentin. Started on zosyn during the night. Pending CXR. Broadened with meropenem, ID consulted.     Review of Systems   Unable to perform ROS: Patient nonverbal     Objective:     Vital Signs (Most Recent):  Temp: 99.2 °F (37.3 °C) (06/13/24 1121)  Pulse: 91 (06/13/24 1142)  Resp: 19 (06/13/24 1121)  BP: 119/73 (06/13/24 1121)  SpO2: 98 % (06/13/24 1121) Vital Signs (24h Range):  Temp:  [97.4 °F (36.3 °C)-101.4 °F (38.6 °C)] 99.2 °F (37.3 °C)  Pulse:  [] 91  Resp:  [16-19] 19  SpO2:  [97 %-99 %] 98 %  BP: (107-159)/(73-92) 119/73     Weight: 105.5 kg (232 lb 9.4 oz)  Body mass index is 36.43 kg/m².    Intake/Output Summary (Last 24 hours) at 6/13/2024 1142  Last data filed at 6/12/2024 1815  Gross per 24 hour   Intake 300 ml   Output 1600 ml   Net -1300 ml         Physical Exam  Vitals and nursing note reviewed.   Constitutional:       General: She is not in acute distress.     Appearance: She is not ill-appearing, toxic-appearing or diaphoretic.   HENT:      Head: Normocephalic and atraumatic.   Eyes:      General: No scleral icterus.        Right eye: No discharge.         Left eye: No discharge.      Conjunctiva/sclera: Conjunctivae normal.   Neck:      Comments: Decannulated  Cardiovascular:      Rate and Rhythm: Normal rate and regular rhythm.      Heart sounds: Normal heart sounds.   Pulmonary:      Effort: Pulmonary effort is normal. No respiratory distress.   Abdominal:      General: Abdomen is flat. There is no distension.      Tenderness: There is no abdominal tenderness.      Comments: PEG in place.      Musculoskeletal:      Right lower leg: No edema.      Left lower leg: No edema.   Skin:     General: Skin is warm and dry.   Neurological:      General: No focal deficit present.      Mental Status: She is alert.      Comments: Nonverbal             Significant Labs: All  pertinent labs within the past 24 hours have been reviewed.    Significant Imaging: I have reviewed all pertinent imaging results/findings within the past 24 hours.

## 2024-06-13 NOTE — PLAN OF CARE
Problem: Infection  Goal: Absence of Infection Signs and Symptoms  Outcome: Progressing     Problem: Skin Injury Risk Increased  Goal: Skin Health and Integrity  Outcome: Progressing     Problem: Adult Inpatient Plan of Care  Goal: Plan of Care Review  Outcome: Progressing       VSS, except for HR sustaining >130, and TMAX off 101.9. MD made aware. Safety precautions maintained. Care is ongoing.

## 2024-06-13 NOTE — PROGRESS NOTES
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 History was given from patient's daughter. She was undergoing dialysis today and noticed she was lethargic, less alert than usual self. Pt completed dialysis and still not acting herself. EMS was called, fever of a 100.  On chart review, she did have an episode of large volume emesis around 1700.  Per EMS, she had a slightly elevated temp 100.0°F, glucose 300s.     In the ED: UA 2+ leuks, >100 WBC, many bacteria, WBC 17, CT abd/pelvis concerning for cystitis. Given vanc/zosyn    Overview/Hospital Course:  Patient was admitted to Hospital Medicine service for medical management and evaluation of urosepsis. Patient was continued on vanc/zosyn. Vascular Neurology consulted concerning mental status change with the recommendation to initiate ASA 81 QD and to obtain an MRI to r/o new stroke. Imaging pending. Nephrology was consulted for regularly scheduled dialysis. Afternoon of 4/12, patient was unable to tolerate filtration of volume during dialsis 2/2 hypotension. Patient received 500cc bolus and was transferred back to floor after finishing the session without volume removed. Will monitor for signs of volume overload. Tailoring insulin regimen while uptitrating tube feeds to goal rate. Staph Epi in all 4 bottles; ID with recommendation to continue Vanc & de-escalate meropenem to ertapenem on 04/15 through 4/16. Patient completed IV course of UTI coverage. Patient tolerated volume removal well in  dialysis throughout the rest of her hospital stay. Pending discharge to LTACH pending bed availability. Patient without BM with one episode of vomiting overnight 4/17. KUB with bowel gas and low concern for obstruction with no transition point noted. Escalating bowel regimen. Pending placement as of 4/22. Pulm consult placed to evaluate for possible trach downsize & eventual decannulation to assist placement if safe. Trach capping trial per pulm for 48h and will assess for decannulation on Monday. DVT studies negative. Pulm successfully decannulated pt 4/30, IR exchanged TDC. Pending swallow study with SLP and waiting for dispo options. Pt failed swallow study, placement pending. Working with PT on mobilize pt to sitting in a chair to expand placement options. Pt successfully mobilized using sandee lift but required 2 people to do so. Discussing dispo plan with family, likely d/c home if HD, DME needs able to be met. Pending acceptance at outpatient HD center. Ongoing placement difficulties d/t need for accepting HD facility to have sandee lift with 2 personnel to operate. Family meeting to discuss disposition options planned for Thursday 5/23 at 1 PM. SW onboard for placement to [facility] to continue dialysis. Once patient gets accepted at [facility], next step will be to work with daughter on [facility] placement. Hyperkalamic, given lokelma x1. Hyperkalemic, shifted K with lokelma x 1 and regular insulin. Nephrology to take on dyalisis today. Optimizing insulin BG and insulin management. SW onboard working on paperwork for long term Medicaid application. CT abdomen with correctly placed PEG tube. Re-started TF and ASA. Pending NH placement. Finished vanc. Will transition Zosyn to augmentin per PEG tube. Pending Daughter to sign [facility] documentation as per SW. New episode of fever overnight. Started on vanc/zosyn. Repeated blood cultures, normal lactate. Pending CXR. Re-broadened during the morning  with meropenem with ID consult.    Interval History: PEG tube in place, no evidence of pus or blood coming from tube. New episode of fever overnight while on augmentin. Started on zosyn during the night. Pending CXR. Broadened with meropenem, ID consulted.     Review of Systems   Unable to perform ROS: Patient nonverbal     Objective:     Vital Signs (Most Recent):  Temp: 99.2 °F (37.3 °C) (06/13/24 1121)  Pulse: 91 (06/13/24 1142)  Resp: 19 (06/13/24 1121)  BP: 119/73 (06/13/24 1121)  SpO2: 98 % (06/13/24 1121) Vital Signs (24h Range):  Temp:  [97.4 °F (36.3 °C)-101.4 °F (38.6 °C)] 99.2 °F (37.3 °C)  Pulse:  [] 91  Resp:  [16-19] 19  SpO2:  [97 %-99 %] 98 %  BP: (107-159)/(73-92) 119/73     Weight: 105.5 kg (232 lb 9.4 oz)  Body mass index is 36.43 kg/m².    Intake/Output Summary (Last 24 hours) at 6/13/2024 1142  Last data filed at 6/12/2024 1815  Gross per 24 hour   Intake 300 ml   Output 1600 ml   Net -1300 ml         Physical Exam  Vitals and nursing note reviewed.   Constitutional:       General: She is not in acute distress.     Appearance: She is not ill-appearing, toxic-appearing or diaphoretic.   HENT:      Head: Normocephalic and atraumatic.   Eyes:      General: No scleral icterus.        Right eye: No discharge.         Left eye: No discharge.      Conjunctiva/sclera: Conjunctivae normal.   Neck:      Comments: Decannulated  Cardiovascular:      Rate and Rhythm: Normal rate and regular rhythm.      Heart sounds: Normal heart sounds.   Pulmonary:      Effort: Pulmonary effort is normal. No respiratory distress.   Abdominal:      General: Abdomen is flat. There is no distension.      Tenderness: There is no abdominal tenderness.      Comments: PEG in place.      Musculoskeletal:      Right lower leg: No edema.      Left lower leg: No edema.   Skin:     General: Skin is warm and dry.   Neurological:      General: No focal deficit present.      Mental Status: She is alert.      Comments: Nonverbal              Significant Labs: All pertinent labs within the past 24 hours have been reviewed.    Significant Imaging: I have reviewed all pertinent imaging results/findings within the past 24 hours.    Assessment/Plan:      * Acute cystitis with hematuria  Resolved       Vulvovaginal candidiasis  Noted 5/29, given 150mg fluconazole x1      Irritant contact dermatitis due friction or contact with other specified body fluids  Wound care following       Debility  Needs:  Wheelchair: patient has a mobility limitation that significantly impairs her ability to participate in one or more mobility related activities of daily living (MRADL's) such as toileting, feeding, dressing, grooming, and bathing in customary locations in the home.  The mobility limitation cannot be sufficiently resolved by the use of a cane or walker.   The use of a manual wheelchair will significantly improve the patient's ability to participate in MRADLS and the patient will use it on regular basis in the home.  Family/pt has expressed her willingness to use a manual wheelchair in the home.  She also has a caregiver who is capable of assisting in mobility.    Mrs Saravia requires a hospital bed due to her requiring positioning of the body in ways not feasible with an ordinary bed to alleviate pain/ is completely immobile /or limited mobility and cannot independently make changes in body position without the use of the bed.  The positioning of the body cannot be sufficiently resolved by the use of pillows and wedges    Patient has a mobility limitation that significantly impairs their ability to participate in one or more mobility related activities of daily living, including toileting. This deficit can be resolved by using a bedside commode. Patient demonstrates mobility limitations that will cause them to be confined to one room at home without bathroom access for up to 30 days. Using a bedside commode will greatly improve the patient's ability to  participate in MRADLs       Complication of vascular dialysis catheter  Cath intermittently clogging, not resolving with cath-hedy, going for IR venogram 4/30 with possible exchange. S/p exchange with IR on 4/30      Constipation  Resolved     Moderate malnutrition  Nutrition consulted. Most recent weight and BMI monitored-     Measurements:  Wt Readings from Last 1 Encounters:   06/11/24 105.5 kg (232 lb 9.4 oz)   Body mass index is 36.43 kg/m².    Patient has been screened and assessed by RD.    Malnutrition Type:  Context: acute illness or injury  Level: moderate    Malnutrition Characteristic Summary:  Weight Loss (Malnutrition): 10% in 6 months  Subcutaneous Fat (Malnutrition): mild depletion  Muscle Mass (Malnutrition): mild depletion  Fluid Accumulation (Malnutrition): mild    Interventions/Recommendations (treatment strategy):  1.  - restarted TF  - previous history of failed swallow studies    Prolonged QT interval  Qtc 526 [04/10/24]      limit Qtc prolonging drugs as able    Spastic hemiplegia of right dominant side as late effect of cerebrovascular disease  Important that patient is able to sit in chair for 4h for dialysis placement needs, even if she requires lift/assistance getting into the chair     This patient has Chronic right hemiplegia due to stroke. Physical therapy services has been scheduled. Continue all standard measures for pressure injury prevention and consult wound care for any wounds (chronic or acute).    ESRD (end stage renal disease) on dialysis  Creatine stable for now. BMP reviewed- noted Estimated Creatinine Clearance: 18.9 mL/min (A) (based on SCr of 4.3 mg/dL (H)). according to latest data. Based on current GFR, CKD stage is end stage.  Monitor UOP and serial BMP and adjust therapy as needed. Renally dose meds. Avoid nephrotoxic medications and procedures.    SW onboard for placement to [facility] to continue dialysis. Once patient gets accepted at [facility], next step will  be to work with daughter on [facility] placement.     -- HD MWF (~1L fluid removal on average per session)  -- nephro following    PEG (percutaneous endoscopic gastrostomy) status  On PEG tube.Wound care with PEG dressing changes.   - CT abdomen with PEG in place.  - restarted TF         Status post tracheostomy  Resolved, decannulated 4/30    Acute encephalopathy  Resolved.     Type 2 diabetes mellitus with hyperglycemia, with long-term current use of insulin  Adjusting insulin to account for diabetic TF    Patient's FSGs are controlled on current medication regimen.  Last A1c reviewed-   Lab Results   Component Value Date    HGBA1C 6.2 (H) 05/27/2024     Most recent fingerstick glucose reviewed-   Recent Labs   Lab 06/12/24  1743 06/12/24  2156 06/13/24  0734 06/13/24  1119   POCTGLUCOSE 100 105 148* 146*       Current correctional scale  Medium  Maintain anti-hyperglycemic dose as follows-   Antihyperglycemics (From admission, onward)      Start     Stop Route Frequency Ordered    06/09/24 2100  insulin glargine U-100 (Lantus) pen 13 Units         -- SubQ Nightly 06/09/24 0756    06/09/24 1255  insulin aspart U-100 pen 0-10 Units         -- SubQ Before meals & nightly PRN 06/09/24 1155          Hold Oral hypoglycemics while patient is in the hospital.  POCT glucose checks   -tube feeds     Hyponatremia  Patient has hyponatremia which is uncontrolled,We will aim to correct the sodium by 4-6mEq in 24 hours. We will monitor sodium Daily. The hyponatremia is due to ESRD. Discussed with nephrology, dialysate bath adjusted to account for and correct hyponatremia.  Recent Labs   Lab 06/13/24  0850   *       - Daily morning CMP  - Continue to montior    Sepsis  This patient does have evidence of infective focus  My overall impression is sepsis.  Source: Skin and Soft Tissue (location Abdominal)  Antibiotics given-   Antibiotics (72h ago, onward)      Start     Stop Route Frequency Ordered    06/13/24 1115   meropenem (MERREM) 500 mg in sodium chloride 0.9 % 100 mL IVPB (MB+)         -- IV Every 12 hours (non-standard times) 06/13/24 1007    06/09/24 0915  mupirocin 2 % ointment         06/14/24 0859 Nasl 2 times daily 06/09/24 0805          Latest lactate reviewed-  Recent Labs   Lab 06/13/24  0850   LACTATE 1.2       Organ dysfunction indicated by Encephalopathy    Fluid challenge Contraindicated- Fluid bolus is contraindicated in this patient due to End Stage Renal Disease     Post- resuscitation assessment No - Post resuscitation assessment not needed       Will Not start Pressors- Levophed for MAP of 65  Source control achieved by: Zosyn     Plan:  - Concerns septic source may be PEG site with associated purulent wound despite Wound Care attempts to protect with barriers. PEG placed by General Surgery 2/2024. General Surgery consulted regarding PEG site ordered CT abdomen. Currently in place.   - new episode of fever on 6/12 while on augmentin suspension. Repeated BCX, CXR and Broadened with meropenem.   - ID consulted     Ros's gangrene  Pt experienced severe episode of ros's gangrene in January of 2024. Pt underwent extensive course, currently still healing from this, but no longer has wound vac. Pt's wound currently covered with bandage. Picture in media tabs    Plan  Wound care        VTE Risk Mitigation (From admission, onward)           Ordered     heparin (porcine) injection 1,000 Units  As needed (PRN)         06/10/24 0035     heparin (porcine) injection 5,000 Units  Every 8 hours         05/29/24 0823     heparin (porcine) injection 3,000 Units  As needed (PRN)         04/17/24 0825                    Discharge Planning   ZEKE: 6/14/2024     Code Status: Full Code   Is the patient medically ready for discharge?: No    Reason for patient still in hospital (select all that apply): Patient trending condition  Discharge Plan A: [facility] Nursing Home placement [facility]          Steven  MD Ruth  Department of Hospital Medicine   [facility] - Telemetry Stepdown

## 2024-06-13 NOTE — ASSESSMENT & PLAN NOTE
53F with prolonged hospitalization, PMH including ros's gangrene (1/24), CVA with deficits (nonverbal), requiring trach/PEG, DM, ESRD on HD MWF, who originally presented to [facility] with cf AMS ( in April). Hospitalization was c/b urosepsis, in which pt completed carbapenem course on 4/16. Trach was capped and later pt was decannulated on 4/30 which was successful. Pt remained inpatient while awaiting dispo plans, as well as requiring mgmt for lyte imbalances and dysphagia.   Pt was restarted on antibiotics on 6/10 per primary team as pt began to be tachycardic, slight leukocytosis, and low grade fever. General surgery was consulted d/t concerns with g tube. Recommended CT AP which was overall unremarkable. GTube in place. No intraabdominal collections noted.  Now with increasing white count and fever. Escalated to meropenem. Blood cultures obtained, pending. Chest xray with hypoareated lungs, as well as accentuation of pulmonary vascular and interstitial markings. Without consolidation.     Pt continues on meropenem. White count 22k. Procal 15. Lactic acid 1.2. ID consulted d/t concerns for leukocytosis.     Recommendations:   - obtain CT chest to assess for evidence of developing pneumonitis or pneumonia given recent vomiting, concerns for aspiration  - continue broad spectrum coverage w/ vanc and meropenem  - follow up all pending cultures  - check RIP

## 2024-06-13 NOTE — PROGRESS NOTES
Vancomycin discontinued. Pharmacy to sign off at this time.    Please call or re-consult if needed.    Brett Ramirez, [place]  Spectra [phone]

## 2024-06-13 NOTE — ASSESSMENT & PLAN NOTE
Patient has hyponatremia which is uncontrolled,We will aim to correct the sodium by 4-6mEq in 24 hours. We will monitor sodium Daily. The hyponatremia is due to ESRD. Discussed with nephrology, dialysate bath adjusted to account for and correct hyponatremia.  Recent Labs   Lab 06/13/24  0850   *       - Daily morning CMP  - Continue to montior

## 2024-06-13 NOTE — PT/OT/SLP PROGRESS
Physical Therapy Treatment    Patient Name:  Sarah Saravia   MRN:  [id number]    Recommendations:     Discharge Recommendations: Moderate Intensity Therapy  Discharge Equipment Recommendations: hospital bed, lift device, wheelchair  Barriers to discharge: Pt requiring increased skilled assistance at current time.     Assessment:     Sarah Saravia is a 53 y.o. female admitted with a medical diagnosis of Acute cystitis with hematuria.  She presents with the following impairments/functional limitations: weakness, impaired endurance, impaired self care skills, impaired functional mobility, impaired balance, impaired cognition, decreased coordination, decreased upper extremity function, decreased lower extremity function, decreased safety awareness, decreased ROM, impaired coordination, impaired skin requiring max/total assistance and verbal cues for bed mob, scooting to EOB/HOB due to weakness, fatigue, fear of falling.   In light of pt's current functional level and deficits, it is anticipated that pt will need to participate in a moderate intensity rehab program consisting of PT and OT in order to achieve full rehab potential to return to previous level of function and roles.  Pt remains motivated to participate in PT session and will cont to benefit from skilled PT intervention..    Rehab Prognosis: Fair; patient would benefit from acute skilled PT services to address these deficits and reach maximum level of function.    Recent Surgery: * No surgery found *      Plan:     During this hospitalization, patient to be seen 3 x/week to address the identified rehab impairments via gait training, therapeutic activities, therapeutic exercises, neuromuscular re-education and progress toward the following goals:    Plan of Care Expires:  06/22/24    Subjective     Chief Complaint: Pt communicates via head nods, facial expressions.  fear of falling, fatigue, weakness  Pain/Comfort:  Pain Rating 1: 0/10  Pain Rating  "Post-Intervention 1: 0/10      Objective:     Communicated with nurse (Yaneth) prior to session.  Patient found HOB elevated with PEG Tube, peripheral IV (daughter present) upon PT entry to room.     General Precautions: Standard, aphasia, aspiration, fall, NPO, contact (ESBL/MDRO)  Orthopedic Precautions: N/A  Braces: N/A  Respiratory Status: Room air     Functional Mobility:  Bed Mobility:     Rolling Left:  total assistance and of 2 persons  Rolling Right: total assistance and of 2 persons  Scooting: anteriorly to the EOB with max/total A of 2; to HOB dependent of 2 via drawsheet  Supine to Sit: total A of 2 for trunk and LE's, exiting on the L side, HOB at 20* angle  Sit to Supine: total A of 2 for trunk and LE's, HOB flat  Balance: static sitting at the EOB with close sup approx 18 min.  Pt demonstrates L lateral lean and requires mod A to increase weight shift to the R   NO OOB transfer to medichair performed on this date due to pt's daughter reports "she's supposed to be going to a CT scan"      AM-PAC 6 CLICK MOBILITY  Turning over in bed (including adjusting bedclothes, sheets and blankets)?: 2  Sitting down on and standing up from a chair with arms (e.g., wheelchair, bedside commode, etc.): 1  Moving from lying on back to sitting on the side of the bed?: 2  Moving to and from a bed to a chair (including a wheelchair)?: 1  Need to walk in hospital room?: 1  Climbing 3-5 steps with a railing?: 1  Basic Mobility Total Score: 8       Treatment & Education:  Patient provided with daily orientation and goals of this PT session. They were educated to call for assistance and to transfer with hospital staff only.  Also, pt was educated on the effects of prolonged immobility and the importance of performing OOB activity and exercises to promote healing and reduce recovery time    Patient left HOB elevated with all lines intact, call button in reach, and daughter present..    GOALS:   Multidisciplinary Problems  "      Physical Therapy Goals          Problem: Physical Therapy    Goal Priority Disciplines Outcome Goal Variances Interventions   Physical Therapy Goal     PT, PT/OT Progressing     Description: Goals to be met by: 24   Goals remain appropriate 5/3/2024 to be met by 24  Goals updated 2024 to be met by 24  Goals updated 24 to be met by 24  Goals remain appropriate 24 to be met by 24    Patient will increase functional independence with mobility by performin. Supine to sit with Maximum Assistance  2. Sit to supine with Maximum Assistance  3. Rolling to Left and Right with Moderate Assistance.  4. Sitting at edge of bed 5 minutes with Moderate Assistance- MET , monitor consistency  4a. Sitting at edge of bed 10 minutes with Supervision  5. Lower extremity exercise program x20 reps per handout, with assistance as needed  6. Sit to stand transfer with Maximum Assistance with or without LRAD  7. Stand for 2 minutes with Maximum Assistance with or without LRAD                         Time Tracking:     PT Received On: 24  PT Start Time: 1349     PT Stop Time: 1423  PT Total Time (min): 34 min     Billable Minutes: Therapeutic Activity 34    Treatment Type: Treatment  PT/PTA: PTA     Number of PTA visits since last PT visit: 2024

## 2024-06-13 NOTE — PLAN OF CARE
NURSING HOME ORDERS    06/13/2024  [facility]  MARGARET SILVA - TELEMETRY STEPDOWN  [address] [facility] [address]  [facility] [address]  Dept: [phone]  Loc: [phone]     Admit to Nursing Home:  long-term Nursing Facili*    Diagnoses:  Active Hospital Problems    Diagnosis  POA    *Acute cystitis with hematuria [N30.01]  Yes    Vulvovaginal candidiasis [B37.31]  No    Irritant contact dermatitis due friction or contact with other specified body fluids [L24.A9]  No    Debility [R53.81]  Yes    Complication of vascular dialysis catheter [T82.9XXA]  No    Moderate malnutrition [E44.0]  Yes    Constipation [K59.00]  No    Prolonged QT interval [R94.31]  Yes    ESRD (end stage renal disease) on dialysis [N18.6, Z99.2]  Not Applicable    Spastic hemiplegia of right dominant side as late effect of cerebrovascular disease [I69.951]  Not Applicable    Status post tracheostomy [Z93.0]  Not Applicable    PEG (percutaneous endoscopic gastrostomy) status [Z93.1]  Not Applicable    Acute encephalopathy [G93.40]  Yes    Type 2 diabetes mellitus with hyperglycemia, with long-term current use of insulin [E11.65, Z79.4]  Not Applicable    Hyponatremia [E87.1]  Yes     POA, Na      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     Activities:   Activity as tolerated     Goals of Care Treatment Preferences:  Code Status: Full Code     What is most important right now is to focus on curative/life-prolongation (regardless of treatment burdens).  Accordingly, we have decided that the best  "plan to meet the patient's goals includes continuing with treatment.        Labs:  CBC, CMP daily     Nursing Precautions:  Aspiration  and Pressure ulcer prevention     Consults:   PT to evaluate and treat 3  times a week, OT to evaluate and treat 3 times a week, Wound Care, and Nutrition to evaluate and recommend diet. SLP to evaluate and treat.      Miscellaneous Care: PEG Care:  Clean site every 24 hours                                                                                                                                                                                        Diabetes Care:  SN to perform and educate Diabetic management with blood glucose monitoring:      Medications: Discontinue all previous medication orders, if any. See new list below.     Medication List        START taking these medications      amoxicillin-pot clavulanate 250-62.5 mg/5ml 250-62.5 mg/5 mL suspension  Commonly known as: AUGMENTIN  10 mLs (500 mg total) by Per G Tube route every evening. Stop 06/18. for 6 days     aspirin 81 MG Chew  1 tablet (81 mg total) by Per G Tube route once daily.     atorvastatin 40 MG tablet  Commonly known as: LIPITOR  1 tablet (40 mg total) by Per G Tube route once daily.     insulin aspart U-100 100 unit/mL (3 mL) Inpn pen  Commonly known as: NovoLOG  Inject 0-5 Units into the skin every 6 (six) hours as needed (Hyperglycemia). **LOW CORRECTION DOSE**  Blood Glucose  mg/dL                         151-200                0 unit  201-250                2 units  251-300                3 units  301-350                4 units  >350                     5 units  Administer subcutaneously if needed at times designated by monitoring schedule.   DO NOT HOLD correction dose insulin for patients who are  NPO.  "HIGH ALERT MEDICATION" - Administer with meals or TF/TPN.  Replaces: insulin aspart U-100 100 unit/mL injection     insulin glargine U-100 (Lantus) 100 unit/mL (3 mL) Inpn pen  Inject 10 Units " into the skin every evening.     metoprolol tartrate 25 MG tablet  Commonly known as: LOPRESSOR  1 tablet (25 mg total) by Per G Tube route 2 (two) times daily.            CONTINUE taking these medications      PANTOPRAZOLE ORAL  40 mg once daily. Liquid via gtube     VITAMIN C 500 MG tablet  Generic drug: ascorbic acid (vitamin C)  500 mg by Per G Tube route 2 (two) times daily.            STOP taking these medications      amLODIPine 10 MG tablet  Commonly known as: NORVASC     carvediloL 25 MG tablet  Commonly known as: COREG     chlorhexidine 0.12 % solution  Commonly known as: PERIDEX     heparin (porcine) 5,000 unit/mL injection     insulin aspart U-100 100 unit/mL injection  Commonly known as: NovoLOG  Replaced by: insulin aspart U-100 100 unit/mL (3 mL) Inpn pen     insulin detemir U-100 100 unit/mL injection  Commonly known as: Levemir     psyllium Powd  Commonly known as: KONSYL     sevelamer carbonate 2.4 gram Pwpk  Commonly known as: RENVELA     zinc sulfate 50 mg zinc (220 mg) capsule  Commonly known as: ZINCATE                Immunizations Administered as of 6/13/2024       No immunizations on file.            Some patients may experience side effects after vaccination.  These may include fever, headache, muscle or joint aches.  Most symptoms resolve with 24-48 hours and do not require urgent medical evaluation unless they persist for more than 72 hours or symptoms are concerning for an unrelated medical condition.          _________________________________  Steven Miranda MD  06/13/2024

## 2024-06-13 NOTE — SUBJECTIVE & OBJECTIVE
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    Hypermagnesemia 04/11/2024    POA, Mg 3.2  Daily chem       Morbid obesity     Necrotizing fasciitis        Past Surgical History:   Procedure Laterality Date    CLOSURE OF WOUND Right 2/22/2024    Procedure: CLOSURE, WOUND;  Surgeon: Steve Cole MD;  Location: [facility] OR [address] [place] [address];  Service: General;  Laterality: Right;    ESOPHAGOGASTRODUODENOSCOPY W/ PEG N/A 2/22/2024    Procedure: EGD, WITH PEG TUBE INSERTION;  Surgeon: Steve Cole MD;  [facility];  Service: General;  Laterality: N/A;    INCISION AND DRAINAGE OF PERIRECTAL REGION N/A 1/29/2024    Procedure: INCISION AND DRAINAGE, PERIRECTAL REGION;  Surgeon: Axel Ramsay MD;  Location: [facility] OR;  Service: General;  Laterality: N/A;    LUMBAR PUNCTURE N/A 2/16/2024    Procedure: Lumbar Puncture;  Surgeon: Macy Boykin;  Location: [facility];  Service: Anesthesiology;  Laterality: N/A;    PLACEMENT, TRIALYSIS CATH Right 1/31/2024    Procedure: INSERTION, CATHETER, TRIPLE LUMEN, HEMODIALYSIS, TEMPORARY;  Surgeon: Alvin Junior MD;  Location: [facility] OR;  Service: General;  Laterality: Right;    REPLACEMENT OF WOUND VACUUM-ASSISTED CLOSURE DEVICE Right 2/12/2024    Procedure: REPLACEMENT, WOUND VAC;  Surgeon: Mundo Carmona MD;  Location: [facility] OR [address] [place] [address];  Service: General;  Laterality: Right;    REPLACEMENT OF WOUND VACUUM-ASSISTED CLOSURE DEVICE N/A 2/15/2024    Procedure: REPLACEMENT, WOUND VAC;  Surgeon: Steve Cole MD;  [facility];  Service: General;  Laterality: N/A;    REPLACEMENT OF WOUND VACUUM-ASSISTED CLOSURE DEVICE Right 2/19/2024    Procedure: REPLACEMENT, WOUND VAC;  Surgeon: Steve Cole MD;  [facility];  Service: General;  Laterality: Right;  RLE/groin    REPLACEMENT OF WOUND VACUUM-ASSISTED CLOSURE DEVICE Right 2/22/2024    Procedure: REPLACEMENT, WOUND VAC;  Surgeon: Steve Cole  MD;  Location: [facility] OR 2ND FLR;  Service: General;  Laterality: Right;    TRACHEOSTOMY N/A 2/22/2024    Procedure: CREATION, TRACHEOSTOMY;  Surgeon: Steve Cole MD;  Location: [facility] OR 2ND FLR;  Service: General;  Laterality: N/A;    WOUND DEBRIDEMENT Bilateral 2/2/2024    Procedure: DEBRIDEMENT, WOUND;  Surgeon: Steve Cole MD;  [facility];  Service: General;  Laterality: Bilateral;  Bilateral groin  Possible wound vac placement    WOUND DEBRIDEMENT Right 2/6/2024    Procedure: DEBRIDEMENT, WOUND, replace wound vac, possible closure;  Surgeon: Steve Cole MD;  [facility];  Service: General;  Laterality: Right;  RLE    WOUND DEBRIDEMENT Right 2/9/2024    Procedure: DEBRIDEMENT, WOUND w wound vac change;  Surgeon: Steve Cole MD;  [facility];  Service: General;  Laterality: Right;  RLE    WOUND DEBRIDEMENT Right 2/12/2024    Procedure: R thigh wound debridement;  Surgeon: Mundo Carmona MD;  [facility];  Service: General;  Laterality: Right;    WOUND EXPLORATION Right 1/31/2024    Procedure: IRRIGATION & DEBRIDEMENT, WOUND DEBRIDEMENT;  Surgeon: Alvin Junior MD;  Location: [facility] OR;  Service: General;  Laterality: Right;       Review of patient's allergies indicates:  No Known Allergies    Medications:  Medications Prior to Admission   Medication Sig    ascorbic acid, vitamin C, (VITAMIN C) 500 MG tablet 500 mg by Per G Tube route 2 (two) times daily.    pantoprazole sodium (PANTOPRAZOLE ORAL) 40 mg once daily. Liquid via gtube    [DISCONTINUED] amLODIPine (NORVASC) 10 MG tablet 10 mg by Per G Tube route once daily. 0900    [DISCONTINUED] carvediloL (COREG) 25 MG tablet 25 mg by Per G Tube route 2 (two) times daily with meals.    [DISCONTINUED] chlorhexidine (PERIDEX) 0.12 % solution Use as directed 15 mLs in the mouth or throat 2 (two) times daily.    [DISCONTINUED] heparin sodium,porcine (HEPARIN, PORCINE,) 5,000 unit/mL injection Inject  7,500 Units into the skin every 8 (eight) hours.    [DISCONTINUED] insulin aspart U-100 (NOVOLOG) 100 unit/mL injection Inject 0-10 Units into the skin as needed for High Blood Sugar. Moderate correction dose sliding scale    [DISCONTINUED] insulin detemir U-100 (LEVEMIR) 100 unit/mL injection Inject 25 Units into the skin 2 (two) times a day.    [DISCONTINUED] psyllium (KONSYL) Powd 1 packet by Per G Tube route once daily.    [DISCONTINUED] sevelamer carbonate (RENVELA) 2.4 gram PwPk 2,400 mg by Per G Tube route 3 (three) times daily.    [DISCONTINUED] zinc sulfate (ZINCATE) 50 mg zinc (220 mg) capsule 220 mg by Per G Tube route once daily.     Antibiotics (From admission, onward)      Start     Stop Route Frequency Ordered    06/13/24 1115  meropenem (MERREM) 500 mg in sodium chloride 0.9 % 100 mL IVPB (MB+)         -- IV Every 12 hours (non-standard times) 06/13/24 1007    06/09/24 0915  mupirocin 2 % ointment         06/14/24 0859 Nasl 2 times daily 06/09/24 0805          Antifungals (From admission, onward)      None          Antivirals (From admission, onward)      None             Immunization History   Administered Date(s) Administered    PPD Test 08/16/2022, 04/23/2024, 05/16/2024, 05/29/2024    Td (ADULT) 11/16/2005       Family History    None       Social History     Socioeconomic History    Marital status: Single   Tobacco Use    Smoking status: Unknown     Social Determinants of Health     Financial Resource Strain: Patient Unable To Answer (3/14/2024)    Overall Financial Resource Strain (CARDIA)     Difficulty of Paying Living Expenses: Patient unable to answer   Recent Concern: Financial Resource Strain - High Risk (3/9/2024)    Overall Financial Resource Strain (CARDIA)     Difficulty of Paying Living Expenses: Very hard   Food Insecurity: Patient Unable To Answer (3/14/2024)    Hunger Vital Sign     Worried About Running Out of Food in the Last Year: Patient unable to answer     Ran Out of Food in  the Last Year: Patient unable to answer   Transportation Needs: Patient Unable To Answer (3/14/2024)    PRAPARE - Transportation     Lack of Transportation (Medical): Patient unable to answer     Lack of Transportation (Non-Medical): Patient unable to answer   Physical Activity: Inactive (3/13/2024)    Exercise Vital Sign     Days of Exercise per Week: 0 days     Minutes of Exercise per Session: 0 min   Stress: Patient Unable To Answer (3/14/2024)    [employer] [place] [employer] - Occupational Stress Questionnaire     Feeling of Stress : Patient unable to answer   Housing Stability: Patient Unable To Answer (3/14/2024)    Housing Stability Vital Sign     Unable to Pay for Housing in the Last Year: Patient unable to answer     Number of Places Lived in the Last Year: 1     Unstable Housing in the Last Year: Patient unable to answer   Recent Concern: Housing Stability - High Risk (3/9/2024)    Housing Stability Vital Sign     Unable to Pay for Housing in the Last Year: Yes     Unstable Housing in the Last Year: No     Review of Systems   Unable to perform ROS: Acuity of condition     Objective:     Vital Signs (Most Recent):  Temp: 99.2 °F (37.3 °C) (06/13/24 1121)  Pulse: 91 (06/13/24 1142)  Resp: 19 (06/13/24 1121)  BP: 119/73 (06/13/24 1121)  SpO2: 98 % (06/13/24 1121) Vital Signs (24h Range):  Temp:  [97.4 °F (36.3 °C)-101.4 °F (38.6 °C)] 99.2 °F (37.3 °C)  Pulse:  [] 91  Resp:  [16-19] 19  SpO2:  [97 %-99 %] 98 %  BP: (107-153)/(73-92) 119/73     Weight: 105.5 kg (232 lb 9.4 oz)  Body mass index is 36.43 kg/m².    Estimated Creatinine Clearance: 18.9 mL/min (A) (based on SCr of 4.3 mg/dL (H)).     Physical Exam  Constitutional:       General: She is not in acute distress.     Appearance: She is well-developed. She is ill-appearing. She is not diaphoretic.   HENT:      Head: Normocephalic and atraumatic.      Right Ear: External ear normal.      Left Ear: External ear normal.      Nose: Nose  normal.   Eyes:      General: No scleral icterus.        Right eye: No discharge.         Left eye: No discharge.      Extraocular Movements: Extraocular movements intact.      Conjunctiva/sclera: Conjunctivae normal.   Pulmonary:      Effort: Pulmonary effort is normal. No respiratory distress.      Breath sounds: No stridor.      Comments: On room air  Abdominal:      General: Abdomen is flat. There is no distension.      Palpations: Abdomen is soft.      Comments: PEG tube w/o issue   Musculoskeletal:         General: Normal range of motion.   Skin:     Findings: No erythema or rash.      Comments: R chest HD line w/o evidence of infection   Neurological:      Mental Status: She is alert. Mental status is at baseline.      Comments: Awake and alert, does not verbalize          Significant Labs: CBC:   Recent Labs   Lab 06/12/24 0405 06/13/24 0622   WBC 5.79 22.66*   HGB 8.8* 8.3*   HCT 29.5* 27.1*    240     CMP:   Recent Labs   Lab 06/12/24 0405 06/12/24  0837 06/12/24 2008 06/12/24  2350 06/13/24  0850   *  --   --   --  130*   K 4.0   < > 4.3 3.1* 4.2  4.1   CL 91*  --   --   --  93*   CO2 21*  --   --   --  24     --   --   --  139*   BUN 38*  --   --   --  17   CREATININE 6.3*  --   --   --  4.3*   CALCIUM 9.5  --   --   --  8.9   PROT 7.0  --   --   --  7.1   ALBUMIN 2.7*  --   --   --  2.7*   BILITOT 0.3  --   --   --  0.4   ALKPHOS 67  --   --   --  74   AST 28  --   --   --  29   ALT 31  --   --   --  32   ANIONGAP 15  --   --   --  13    < > = values in this interval not displayed.     Microbiology Results (last 7 days)       Procedure Component Value Units Date/Time    Blood culture [[id number]] Collected: 06/13/24 0622    Order Status: Completed Specimen: Blood Updated: 06/13/24 1315     Blood Culture, Routine No Growth to date    Blood culture [[id number]] Collected: 06/13/24 0622    Order Status: Completed Specimen: Blood Updated: 06/13/24 1315     Blood Culture, Routine No  Growth to date    Blood culture [[id number]] Collected: 06/10/24 0719    Order Status: Completed Specimen: Blood Updated: 06/13/24 1012     Blood Culture, Routine No Growth to date      No Growth to date      No Growth to date      No Growth to date    Narrative:      Lft hand    Blood culture [[id number]] Collected: 06/10/24 0719    Order Status: Completed Specimen: Blood Updated: 06/13/24 1012     Blood Culture, Routine No Growth to date      No Growth to date      No Growth to date      No Growth to date    Narrative:      Lft forearm            Significant Imaging: I have reviewed all pertinent imaging results/findings within the past 24 hours.

## 2024-06-13 NOTE — CARE UPDATE
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    Last VS: /82   Pulse (!) 116   Temp (!) 101.2 °F (38.4 °C)   Resp 17   Ht 5' 7" (1.702 m)   Wt 105.5 kg (232 lb 9.4 oz)   SpO2 97%   BMI 36.43 kg/m²     24H Vital Sign Range:  Temp:  [97.4 °F (36.3 °C)-101.4 °F (38.6 °C)]   Pulse:  []   Resp:  [17-19]   BP: (105-159)/(71-92)   SpO2:  [97 %-100 %]     Level of Consciousness (AVPU): alert    Recent Labs     06/10/24  0719 06/11/24  0520 06/12/24  0405   WBC 14.31* 6.81 5.79   HGB 9.8* 8.5* 8.8*   HCT 33.3* 27.6* 29.5*    198 226       Recent Labs     06/10/24  1114 06/10/24  2314 06/11/24  0519 06/11/24  0816 06/12/24  0405 06/12/24  0837 06/12/24 2008 06/12/24  2350   *  --  130*  --  127*  --   --   --    K 4.7   < > 4.1   < > 4.0 4.5 4.3 3.1*   CL 92*  --  93*  --  91*  --   --   --    CO2 22*  --  23  --  21*  --   --   --    BUN 22*  --  29*  --  38*  --   --   --    CREATININE 3.6*  --  4.9*  --  6.3*  --   --   --    *  --  110  --  101  --   --   --    PHOS 2.9  --  4.2  --  5.3*  --   --   --    MG  --   --  2.0  --  2.1  --   --   --     < > = values in this interval not displayed.        Recent Labs     06/10/24  0232   PH 7.385   PCO2 45.5*   PO2 33*   HCO3 27.2   POCSATURATED 63   BE 2        OXYGEN:  Flow (L/min) (Oxygen Therapy): 2  Oxygen Concentration (%): 21       MEWS score: 3    Bedside RNCindi contacted for fever/tachycardia reports prn tyelnol adminstered, primary RN to update HM on reoccurrence of fever. No additional concerns verbalized at this time. Instructed to call [phone] for further concerns or assistance.    Eliu Montalvo, ELISABETH       "

## 2024-06-13 NOTE — ASSESSMENT & PLAN NOTE
Adjusting insulin to account for diabetic TF    Patient's FSGs are controlled on current medication regimen.  Last A1c reviewed-   Lab Results   Component Value Date    HGBA1C 6.2 (H) 05/27/2024     Most recent fingerstick glucose reviewed-   Recent Labs   Lab 06/12/24  1743 06/12/24  2156 06/13/24  0734 06/13/24  1119   POCTGLUCOSE 100 105 148* 146*       Current correctional scale  Medium  Maintain anti-hyperglycemic dose as follows-   Antihyperglycemics (From admission, onward)    Start     Stop Route Frequency Ordered    06/09/24 2100  insulin glargine U-100 (Lantus) pen 13 Units         -- SubQ Nightly 06/09/24 0756    06/09/24 1255  insulin aspart U-100 pen 0-10 Units         -- SubQ Before meals & nightly PRN 06/09/24 1155        Hold Oral hypoglycemics while patient is in the hospital.  POCT glucose checks   -tube feeds

## 2024-06-13 NOTE — ASSESSMENT & PLAN NOTE
This patient does have evidence of infective focus  My overall impression is sepsis.  Source: Skin and Soft Tissue (location Abdominal)  Antibiotics given-   Antibiotics (72h ago, onward)      Start     Stop Route Frequency Ordered    06/13/24 1115  meropenem (MERREM) 500 mg in sodium chloride 0.9 % 100 mL IVPB (MB+)         -- IV Every 12 hours (non-standard times) 06/13/24 1007    06/09/24 0915  mupirocin 2 % ointment         06/14/24 0859 Nasl 2 times daily 06/09/24 0805          Latest lactate reviewed-  Recent Labs   Lab 06/13/24  0850   LACTATE 1.2       Organ dysfunction indicated by Encephalopathy    Fluid challenge Contraindicated- Fluid bolus is contraindicated in this patient due to End Stage Renal Disease     Post- resuscitation assessment No - Post resuscitation assessment not needed       Will Not start Pressors- Levophed for MAP of 65  Source control achieved by: Shanique     Plan:  - Concerns septic source may be PEG site with associated purulent wound despite Wound Care attempts to protect with barriers. PEG placed by General Surgery 2/2024. General Surgery consulted regarding PEG site ordered CT abdomen. Currently in place.   - new episode of fever on 6/12 while on augmentin suspension. Repeated BCX, CXR and Broadened with meropenem.   - ID consulted

## 2024-06-13 NOTE — CLINICAL REVIEW
IP Sepsis Screen (most recent)       Sepsis Screen (IP) - 06/13/24 0817       Is the patient's history or complaint suggestive of a possible infection? Yes  -    Are there at least two of the following signs and symptoms present? Yes  -    Sepsis signs/symptoms - Tachycardia Tachycardia     >90  -    Sepsis signs/symptoms - WBC WBC < 4,000 or WBC > 12,000  -    Are any of the following organ dysfunction criteria present and not considered to be due to a chronic condition? Yes   ESRD-HD -    Organ Dysfunction Criteria Lactate > 2.0  -    Initiate Sepsis Protocol No  -    Reason sepsis not considered Pt. receiving appropriate management  -              User Key  (r) = Recorded By, (t) = Taken By, (c) = Cosigned By      Initials Name    Kathy Velasquez RN

## 2024-06-13 NOTE — ASSESSMENT & PLAN NOTE
Creatine stable for now. BMP reviewed- noted Estimated Creatinine Clearance: 18.9 mL/min (A) (based on SCr of 4.3 mg/dL (H)). according to latest data. Based on current GFR, CKD stage is end stage.  Monitor UOP and serial BMP and adjust therapy as needed. Renally dose meds. Avoid nephrotoxic medications and procedures.    SW onboard for placement to [facility] to continue dialysis. Once patient gets accepted at [facility], next step will be to work with daughter on [facility] placement.     -- HD MWF (~1L fluid removal on average per session)  -- nephro following

## 2024-06-14 LAB
ADENOVIRUS: NOT DETECTED
ALBUMIN SERPL BCP-MCNC: 2.6 G/DL (ref 3.5–5.2)
ALP SERPL-CCNC: 63 U/L (ref 55–135)
ALT SERPL W/O P-5'-P-CCNC: 26 U/L (ref 10–44)
ANION GAP SERPL CALC-SCNC: 14 MMOL/L (ref 8–16)
AST SERPL-CCNC: 27 U/L (ref 10–40)
BASOPHILS # BLD AUTO: 0.05 K/UL (ref 0–0.2)
BASOPHILS NFR BLD: 0.5 % (ref 0–1.9)
BILIRUB SERPL-MCNC: 0.3 MG/DL (ref 0.1–1)
BORDETELLA PARAPERTUSSIS (IS1001): NOT DETECTED
BORDETELLA PERTUSSIS (PTXP): NOT DETECTED
BUN SERPL-MCNC: 26 MG/DL (ref 6–20)
CALCIUM SERPL-MCNC: 9 MG/DL (ref 8.7–10.5)
CHLAMYDIA PNEUMONIAE: NOT DETECTED
CHLORIDE SERPL-SCNC: 90 MMOL/L (ref 95–110)
CO2 SERPL-SCNC: 24 MMOL/L (ref 23–29)
CORONAVIRUS 229E, COMMON COLD VIRUS: NOT DETECTED
CORONAVIRUS HKU1, COMMON COLD VIRUS: NOT DETECTED
CORONAVIRUS NL63, COMMON COLD VIRUS: NOT DETECTED
CORONAVIRUS OC43, COMMON COLD VIRUS: NOT DETECTED
CREAT SERPL-MCNC: 5.4 MG/DL (ref 0.5–1.4)
DIFFERENTIAL METHOD BLD: ABNORMAL
EOSINOPHIL # BLD AUTO: 0.4 K/UL (ref 0–0.5)
EOSINOPHIL NFR BLD: 3.8 % (ref 0–8)
ERYTHROCYTE [DISTWIDTH] IN BLOOD BY AUTOMATED COUNT: 15.9 % (ref 11.5–14.5)
EST. GFR  (NO RACE VARIABLE): 8.9 ML/MIN/1.73 M^2
FLUBV RNA NPH QL NAA+NON-PROBE: NOT DETECTED
GLUCOSE SERPL-MCNC: 97 MG/DL (ref 70–110)
HCT VFR BLD AUTO: 25.8 % (ref 37–48.5)
HGB BLD-MCNC: 7.9 G/DL (ref 12–16)
HPIV1 RNA NPH QL NAA+NON-PROBE: NOT DETECTED
HPIV2 RNA NPH QL NAA+NON-PROBE: NOT DETECTED
HPIV3 RNA NPH QL NAA+NON-PROBE: NOT DETECTED
HPIV4 RNA NPH QL NAA+NON-PROBE: NOT DETECTED
HUMAN METAPNEUMOVIRUS: NOT DETECTED
IMM GRANULOCYTES # BLD AUTO: 0.04 K/UL (ref 0–0.04)
IMM GRANULOCYTES NFR BLD AUTO: 0.4 % (ref 0–0.5)
INFLUENZA A (SUBTYPES H1,H1-2009,H3): NOT DETECTED
LYMPHOCYTES # BLD AUTO: 1.4 K/UL (ref 1–4.8)
LYMPHOCYTES NFR BLD: 13.8 % (ref 18–48)
MAGNESIUM SERPL-MCNC: 1.9 MG/DL (ref 1.6–2.6)
MCH RBC QN AUTO: 25.2 PG (ref 27–31)
MCHC RBC AUTO-ENTMCNC: 30.6 G/DL (ref 32–36)
MCV RBC AUTO: 82 FL (ref 82–98)
MONOCYTES # BLD AUTO: 1.4 K/UL (ref 0.3–1)
MONOCYTES NFR BLD: 13.1 % (ref 4–15)
MYCOPLASMA PNEUMONIAE: NOT DETECTED
NEUTROPHILS # BLD AUTO: 7.1 K/UL (ref 1.8–7.7)
NEUTROPHILS NFR BLD: 68.4 % (ref 38–73)
NRBC BLD-RTO: 0 /100 WBC
PHOSPHATE SERPL-MCNC: 4.7 MG/DL (ref 2.7–4.5)
PLATELET # BLD AUTO: 255 K/UL (ref 150–450)
PMV BLD AUTO: 10.2 FL (ref 9.2–12.9)
POCT GLUCOSE: 113 MG/DL (ref 70–110)
POCT GLUCOSE: 116 MG/DL (ref 70–110)
POCT GLUCOSE: 126 MG/DL (ref 70–110)
POCT GLUCOSE: 97 MG/DL (ref 70–110)
POTASSIUM SERPL-SCNC: 3.3 MMOL/L (ref 3.5–5.1)
POTASSIUM SERPL-SCNC: 3.3 MMOL/L (ref 3.5–5.1)
POTASSIUM SERPL-SCNC: 4 MMOL/L (ref 3.5–5.1)
POTASSIUM SERPL-SCNC: 4.4 MMOL/L (ref 3.5–5.1)
PROT SERPL-MCNC: 6.8 G/DL (ref 6–8.4)
RBC # BLD AUTO: 3.14 M/UL (ref 4–5.4)
RESPIRATORY INFECTION PANEL SOURCE: NORMAL
RSV RNA NPH QL NAA+NON-PROBE: NOT DETECTED
RV+EV RNA NPH QL NAA+NON-PROBE: NOT DETECTED
SARS-COV-2 RNA RESP QL NAA+PROBE: NOT DETECTED
SODIUM SERPL-SCNC: 128 MMOL/L (ref 136–145)
VANCOMYCIN SERPL-MCNC: 13.3 UG/ML
WBC # BLD AUTO: 10.4 K/UL (ref 3.9–12.7)

## 2024-06-14 PROCEDURE — 97112 NEUROMUSCULAR REEDUCATION: CPT

## 2024-06-14 PROCEDURE — 99232 SBSQ HOSP IP/OBS MODERATE 35: CPT | Mod: ,,, | Performed by: NURSE PRACTITIONER

## 2024-06-14 PROCEDURE — 97535 SELF CARE MNGMENT TRAINING: CPT

## 2024-06-14 PROCEDURE — 97530 THERAPEUTIC ACTIVITIES: CPT

## 2024-06-14 PROCEDURE — 63600175 PHARM REV CODE 636 W HCPCS: Performed by: NURSE PRACTITIONER

## 2024-06-14 PROCEDURE — 25000003 PHARM REV CODE 250: Performed by: FAMILY MEDICINE

## 2024-06-14 PROCEDURE — 25000003 PHARM REV CODE 250

## 2024-06-14 PROCEDURE — 83735 ASSAY OF MAGNESIUM: CPT | Performed by: STUDENT IN AN ORGANIZED HEALTH CARE EDUCATION/TRAINING PROGRAM

## 2024-06-14 PROCEDURE — 85025 COMPLETE CBC W/AUTO DIFF WBC: CPT | Performed by: STUDENT IN AN ORGANIZED HEALTH CARE EDUCATION/TRAINING PROGRAM

## 2024-06-14 PROCEDURE — 63600175 PHARM REV CODE 636 W HCPCS

## 2024-06-14 PROCEDURE — 25000003 PHARM REV CODE 250: Performed by: STUDENT IN AN ORGANIZED HEALTH CARE EDUCATION/TRAINING PROGRAM

## 2024-06-14 PROCEDURE — 27000207 HC ISOLATION

## 2024-06-14 PROCEDURE — 20600001 HC STEP DOWN PRIVATE ROOM

## 2024-06-14 PROCEDURE — 63600175 PHARM REV CODE 636 W HCPCS: Performed by: FAMILY MEDICINE

## 2024-06-14 PROCEDURE — 84132 ASSAY OF SERUM POTASSIUM: CPT

## 2024-06-14 PROCEDURE — 80202 ASSAY OF VANCOMYCIN: CPT | Performed by: FAMILY MEDICINE

## 2024-06-14 PROCEDURE — 25000003 PHARM REV CODE 250: Performed by: NURSE PRACTITIONER

## 2024-06-14 PROCEDURE — 99233 SBSQ HOSP IP/OBS HIGH 50: CPT | Mod: ,,, | Performed by: REGISTERED NURSE

## 2024-06-14 PROCEDURE — 84100 ASSAY OF PHOSPHORUS: CPT | Performed by: STUDENT IN AN ORGANIZED HEALTH CARE EDUCATION/TRAINING PROGRAM

## 2024-06-14 PROCEDURE — 36415 COLL VENOUS BLD VENIPUNCTURE: CPT

## 2024-06-14 PROCEDURE — 87486 CHLMYD PNEUM DNA AMP PROBE: CPT | Performed by: INTERNAL MEDICINE

## 2024-06-14 PROCEDURE — 92526 ORAL FUNCTION THERAPY: CPT

## 2024-06-14 PROCEDURE — 80100014 HC HEMODIALYSIS 1:1

## 2024-06-14 PROCEDURE — 80053 COMPREHEN METABOLIC PANEL: CPT

## 2024-06-14 RX ORDER — POTASSIUM CHLORIDE 7.45 MG/ML
10 INJECTION INTRAVENOUS
Status: DISCONTINUED | OUTPATIENT
Start: 2024-06-14 | End: 2024-06-14

## 2024-06-14 RX ADMIN — POTASSIUM CHLORIDE 10 MEQ: 7.46 INJECTION, SOLUTION INTRAVENOUS at 09:06

## 2024-06-14 RX ADMIN — INSULIN GLARGINE 13 UNITS: 100 INJECTION, SOLUTION SUBCUTANEOUS at 09:06

## 2024-06-14 RX ADMIN — METOPROLOL TARTRATE 25 MG: 25 TABLET, FILM COATED ORAL at 09:06

## 2024-06-14 RX ADMIN — VANCOMYCIN HYDROCHLORIDE 750 MG: 750 INJECTION, POWDER, LYOPHILIZED, FOR SOLUTION INTRAVENOUS at 09:06

## 2024-06-14 RX ADMIN — MEROPENEM 500 MG: 500 INJECTION INTRAVENOUS at 11:06

## 2024-06-14 RX ADMIN — MEROPENEM 500 MG: 500 INJECTION INTRAVENOUS at 12:06

## 2024-06-14 RX ADMIN — HEPARIN SODIUM 5000 UNITS: 5000 INJECTION INTRAVENOUS; SUBCUTANEOUS at 09:06

## 2024-06-14 RX ADMIN — HEPARIN SODIUM 3000 UNITS: 1000 INJECTION, SOLUTION INTRAVENOUS; SUBCUTANEOUS at 03:06

## 2024-06-14 RX ADMIN — HEPARIN SODIUM 5000 UNITS: 5000 INJECTION INTRAVENOUS; SUBCUTANEOUS at 06:06

## 2024-06-14 RX ADMIN — ASPIRIN 81 MG CHEWABLE TABLET 81 MG: 81 TABLET CHEWABLE at 09:06

## 2024-06-14 RX ADMIN — PANTOPRAZOLE SODIUM 40 MG: 40 GRANULE, DELAYED RELEASE ORAL at 09:06

## 2024-06-14 RX ADMIN — Medication 500 MG: at 09:06

## 2024-06-14 RX ADMIN — POLYETHYLENE GLYCOL 3350 17 G: 17 POWDER, FOR SOLUTION ORAL at 09:06

## 2024-06-14 RX ADMIN — ATORVASTATIN CALCIUM 40 MG: 40 TABLET, FILM COATED ORAL at 09:06

## 2024-06-14 RX ADMIN — SEVELAMER CARBONATE 0.8 G: 800 POWDER, FOR SUSPENSION ORAL at 09:06

## 2024-06-14 RX ADMIN — ERYTHROPOIETIN 6100 UNITS: 10000 INJECTION, SOLUTION INTRAVENOUS; SUBCUTANEOUS at 04:06

## 2024-06-14 RX ADMIN — POTASSIUM BICARBONATE 50 MEQ: 978 TABLET, EFFERVESCENT ORAL at 11:06

## 2024-06-14 RX ADMIN — SEVELAMER CARBONATE 0.8 G: 800 POWDER, FOR SUSPENSION ORAL at 11:06

## 2024-06-14 NOTE — PLAN OF CARE
Problem: Physical Therapy  Goal: Physical Therapy Goal  Description: Goals to be met by: 24   Goals remain appropriate 5/3/2024 to be met by 24  Goals updated 2024 to be met by 24  Goals updated 24 to be met by 24  Goals remain appropriate 24 to be met by 24    Patient will increase functional independence with mobility by performin. Supine to sit with Maximum Assistance  2. Sit to supine with Maximum Assistance  3. Rolling to Left and Right with Moderate Assistance.  4. Sitting at edge of bed 5 minutes with Moderate Assistance- MET , monitor consistency  4a. Sitting at edge of bed 10 minutes with Supervision  5. Lower extremity exercise program x20 reps per handout, with assistance as needed  6. Sit to stand transfer with Maximum Assistance with or without LRAD  7. Stand for 2 minutes with Maximum Assistance with or without LRAD    Outcome: Progressing

## 2024-06-14 NOTE — SUBJECTIVE & OBJECTIVE
Interval hx:    CT chest without consolidation. RIP negative. Pt clinically improving on meropenem.       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    Hypermagnesemia 04/11/2024    POA, Mg 3.2  Daily chem       Morbid obesity     Necrotizing fasciitis        Past Surgical History:   Procedure Laterality Date    CLOSURE OF WOUND Right 2/22/2024    Procedure: CLOSURE, WOUND;  Surgeon: Steve Cole MD;  Location: [facility] OR [address] [place] [address];  Service: General;  Laterality: Right;    ESOPHAGOGASTRODUODENOSCOPY W/ PEG N/A 2/22/2024    Procedure: EGD, WITH PEG TUBE INSERTION;  Surgeon: Steve Cole MD;  Location: [facility] OR [address] [place] [address];  Service: General;  Laterality: N/A;    INCISION AND DRAINAGE OF PERIRECTAL REGION N/A 1/29/2024    Procedure: INCISION AND DRAINAGE, PERIRECTAL REGION;  Surgeon: Axel Ramsay MD;  Location: [facility] OR;  Service: General;  Laterality: N/A;    LUMBAR PUNCTURE N/A 2/16/2024    Procedure: Lumbar Puncture;  Surgeon: Macy Boykin;  Location: [facility];  Service: Anesthesiology;  Laterality: N/A;    PLACEMENT, TRIALYSIS CATH Right 1/31/2024    Procedure: INSERTION, CATHETER, TRIPLE LUMEN, HEMODIALYSIS, TEMPORARY;  Surgeon: Alvin uJnior MD;  Location: [facility] OR;  Service: General;  Laterality: Right;    REPLACEMENT OF WOUND VACUUM-ASSISTED CLOSURE DEVICE Right 2/12/2024    Procedure: REPLACEMENT, WOUND VAC;  Surgeon: Mundo Carmona MD;  Location: [facility] OR [address] [place] [address];  Service: General;  Laterality: Right;    REPLACEMENT OF WOUND VACUUM-ASSISTED CLOSURE DEVICE N/A 2/15/2024    Procedure: REPLACEMENT, WOUND VAC;  Surgeon: Steve Cole MD;  [facility];  Service: General;  Laterality: N/A;    REPLACEMENT OF WOUND VACUUM-ASSISTED CLOSURE DEVICE Right 2/19/2024    Procedure: REPLACEMENT, WOUND VAC;  Surgeon: Steve Cole MD;  [facility];  Service: General;  Laterality: Right;  RLE/groin    REPLACEMENT OF WOUND  VACUUM-ASSISTED CLOSURE DEVICE Right 2/22/2024    Procedure: REPLACEMENT, WOUND VAC;  Surgeon: Steve Cole MD;  Location: [facility] OR 2ND FLR;  Service: General;  Laterality: Right;    TRACHEOSTOMY N/A 2/22/2024    Procedure: CREATION, TRACHEOSTOMY;  Surgeon: Steve Cole MD;  Location: [facility] OR 2ND FLR;  Service: General;  Laterality: N/A;    WOUND DEBRIDEMENT Bilateral 2/2/2024    Procedure: DEBRIDEMENT, WOUND;  Surgeon: Steve Cole MD;  Location: [facility] OR [facility] FLR;  Service: General;  Laterality: Bilateral;  Bilateral groin  Possible wound vac placement    WOUND DEBRIDEMENT Right 2/6/2024    Procedure: DEBRIDEMENT, WOUND, replace wound vac, possible closure;  Surgeon: Steve Cole MD;  [facility];  Service: General;  Laterality: Right;  RLE    WOUND DEBRIDEMENT Right 2/9/2024    Procedure: DEBRIDEMENT, WOUND w wound vac change;  Surgeon: Steve Cole MD;  [facility];  Service: General;  Laterality: Right;  RLE    WOUND DEBRIDEMENT Right 2/12/2024    Procedure: R thigh wound debridement;  Surgeon: Mundo Carmona MD;  [facility];  Service: General;  Laterality: Right;    WOUND EXPLORATION Right 1/31/2024    Procedure: IRRIGATION & DEBRIDEMENT, WOUND DEBRIDEMENT;  Surgeon: Alvin Junior MD;  Location: [facility] OR;  Service: General;  Laterality: Right;       Review of patient's allergies indicates:  No Known Allergies    Medications:  Medications Prior to Admission   Medication Sig    ascorbic acid, vitamin C, (VITAMIN C) 500 MG tablet 500 mg by Per G Tube route 2 (two) times daily.    pantoprazole sodium (PANTOPRAZOLE ORAL) 40 mg once daily. Liquid via gtube    [DISCONTINUED] amLODIPine (NORVASC) 10 MG tablet 10 mg by Per G Tube route once daily. 0900    [DISCONTINUED] carvediloL (COREG) 25 MG tablet 25 mg by Per G Tube route 2 (two) times daily with meals.    [DISCONTINUED] chlorhexidine (PERIDEX) 0.12 % solution Use as directed 15 mLs in the mouth or throat 2  "(two) times daily.    [DISCONTINUED] heparin sodium,porcine (HEPARIN, PORCINE,) 5,000 unit/mL injection Inject 7,500 Units into the skin every 8 (eight) hours.    [DISCONTINUED] insulin aspart U-100 (NOVOLOG) 100 unit/mL injection Inject 0-10 Units into the skin as needed for High Blood Sugar. Moderate correction dose sliding scale    [DISCONTINUED] insulin detemir U-100 (LEVEMIR) 100 unit/mL injection Inject 25 Units into the skin 2 (two) times a day.    [DISCONTINUED] psyllium (KONSYL) Powd 1 packet by Per G Tube route once daily.    [DISCONTINUED] sevelamer carbonate (RENVELA) 2.4 gram PwPk 2,400 mg by Per G Tube route 3 (three) times daily.    [DISCONTINUED] zinc sulfate (ZINCATE) 50 mg zinc (220 mg) capsule 220 mg by Per G Tube route once daily.     Antibiotics (From admission, onward)      Start     Stop Route Frequency Ordered    06/14/24 2000  vancomycin 750 mg in dextrose 5 % (D5W) 250 mL IVPB (Vial-Mate)         -- IV Once 06/14/24 1413    06/14/24 0740  vancomycin - pharmacy to dose  (vancomycin IVPB (PEDS and ADULTS))        Placed in "And" Linked Group    -- IV pharmacy to manage frequency 06/14/24 0640    06/13/24 1115  meropenem (MERREM) 500 mg in sodium chloride 0.9 % 100 mL IVPB (MB+)         -- IV Every 12 hours (non-standard times) 06/13/24 1007          Antifungals (From admission, onward)      None          Antivirals (From admission, onward)      None             Immunization History   Administered Date(s) Administered    PPD Test 08/16/2022, 04/23/2024, 05/16/2024, 05/29/2024    Td (ADULT) 11/16/2005       Family History    None       Social History     Socioeconomic History    Marital status: Single   Tobacco Use    Smoking status: Unknown     Social Determinants of Health     Financial Resource Strain: Patient Unable To Answer (3/14/2024)    Overall Financial Resource Strain (CARDIA)     Difficulty of Paying Living Expenses: Patient unable to answer   Recent Concern: Financial Resource " Strain - High Risk (3/9/2024)    Overall Financial Resource Strain (CARDIA)     Difficulty of Paying Living Expenses: Very hard   Food Insecurity: Patient Unable To Answer (3/14/2024)    Hunger Vital Sign     Worried About Running Out of Food in the Last Year: Patient unable to answer     Ran Out of Food in the Last Year: Patient unable to answer   Transportation Needs: Patient Unable To Answer (3/14/2024)    PRAPARE - Transportation     Lack of Transportation (Medical): Patient unable to answer     Lack of Transportation (Non-Medical): Patient unable to answer   Physical Activity: Inactive (3/13/2024)    Exercise Vital Sign     Days of Exercise per Week: 0 days     Minutes of Exercise per Session: 0 min   Stress: Patient Unable To Answer (3/14/2024)    [employer] - Occupational Stress Questionnaire     Feeling of Stress : Patient unable to answer   Housing Stability: Patient Unable To Answer (3/14/2024)    Housing Stability Vital Sign     Unable to Pay for Housing in the Last Year: Patient unable to answer     Number of Places Lived in the Last Year: 1     Unstable Housing in the Last Year: Patient unable to answer   Recent Concern: Housing Stability - High Risk (3/9/2024)    Housing Stability Vital Sign     Unable to Pay for Housing in the Last Year: Yes     Unstable Housing in the Last Year: No     Review of Systems   Unable to perform ROS: Patient nonverbal     Objective:     Vital Signs (Most Recent):  Temp: 98.1 °F (36.7 °C) (06/14/24 1132)  Pulse: 92 (06/14/24 1530)  Resp: 16 (06/14/24 1132)  BP: 116/68 (06/14/24 1530)  SpO2: 100 % (06/14/24 1132) Vital Signs (24h Range):  Temp:  [98.1 °F (36.7 °C)-98.9 °F (37.2 °C)] 98.1 °F (36.7 °C)  Pulse:  [81-95] 92  Resp:  [16-19] 16  SpO2:  [97 %-100 %] 100 %  BP: ()/(61-82) 116/68     Weight: 105.5 kg (232 lb 9.4 oz)  Body mass index is 36.43 kg/m².    Estimated Creatinine Clearance: 15.1 mL/min (A) (based on SCr of 5.4 mg/dL  (H)).     Physical Exam  Vitals reviewed.   Constitutional:       General: She is not in acute distress.     Appearance: She is well-developed. She is not ill-appearing or diaphoretic.   HENT:      Head: Normocephalic and atraumatic.      Right Ear: External ear normal.      Left Ear: External ear normal.      Nose: Nose normal.   Eyes:      General: No scleral icterus.        Right eye: No discharge.         Left eye: No discharge.      Extraocular Movements: Extraocular movements intact.      Conjunctiva/sclera: Conjunctivae normal.   Cardiovascular:      Rate and Rhythm: Normal rate and regular rhythm.      Pulses: Normal pulses.      Heart sounds: No murmur heard.     No friction rub.   Pulmonary:      Effort: Pulmonary effort is normal. No respiratory distress.      Breath sounds: Normal breath sounds. No stridor. No wheezing.      Comments: On room air  Abdominal:      General: Abdomen is flat. There is no distension.      Palpations: Abdomen is soft. There is no mass.      Tenderness: There is no abdominal tenderness.      Comments: PEG tube w/o issue   Musculoskeletal:         General: Normal range of motion.   Skin:     Findings: No erythema or rash.      Comments: R chest HD line w/o evidence of infection   Neurological:      Mental Status: She is alert. Mental status is at baseline.      Comments: Awake and alert, does not verbalize          Significant Labs: CBC:   Recent Labs   Lab 06/13/24  0622 06/14/24  0605   WBC 22.66* 10.40   HGB 8.3* 7.9*   HCT 27.1* 25.8*    255     CMP:   Recent Labs   Lab 06/13/24  0850 06/13/24  1634 06/13/24  2249 06/14/24  0829   *  --   --  128*   K 4.2  4.1 3.5 3.2* 3.3*  3.3*   CL 93*  --   --  90*   CO2 24  --   --  24   *  --   --  97   BUN 17  --   --  26*   CREATININE 4.3*  --   --  5.4*   CALCIUM 8.9  --   --  9.0   PROT 7.1  --   --  6.8   ALBUMIN 2.7*  --   --  2.6*   BILITOT 0.4  --   --  0.3   ALKPHOS 74  --   --  63   AST 29  --   --  27    ALT 32  --   --  26   ANIONGAP 13  --   --  14     Microbiology Results (last 7 days)       Procedure Component Value Units Date/Time    Respiratory Infection Panel (PCR), Nasopharyngeal [[id number]] Collected: 06/14/24 0922    Order Status: Completed Specimen: Nasopharyngeal Swab Updated: 06/14/24 1117     Respiratory Infection Panel Source NP Swab     Adenovirus Not Detected     Coronavirus 229E, Common Cold Virus Not Detected     Coronavirus HKU1, Common Cold Virus Not Detected     Coronavirus NL63, Common Cold Virus Not Detected     Coronavirus OC43, Common Cold Virus Not Detected     Comment: The Coronavirus strains detected in this test cause the common cold.  These strains are not the COVID-19 (novel Coronavirus)strain   associated with the respiratory disease outbreak.          SARS-CoV2 (COVID-19) Qualitative PCR Not Detected     Human Metapneumovirus Not Detected     Human Rhinovirus/Enterovirus Not Detected     Influenza A (subtypes H1, H1-2009,H3) Not Detected     Influenza B Not Detected     Parainfluenza Virus 1 Not Detected     Parainfluenza Virus 2 Not Detected     Parainfluenza Virus 3 Not Detected     Parainfluenza Virus 4 Not Detected     Respiratory Syncytial Virus Not Detected     Bordetella Parapertussis (PV8029) Not Detected     Bordetella pertussis (ptxP) Not Detected     Chlamydia pneumoniae Not Detected     Mycoplasma pneumoniae Not Detected    Narrative:      Assay not valid for lower respiratory specimens, alternate  testing required.    Respiratory Infection Panel (PCR), Nasopharyngeal [[id number]]     Order Status: No result Specimen: Nasopharyngeal Swab     Blood culture [[id number]] Collected: 06/10/24 0719    Order Status: Completed Specimen: Blood Updated: 06/14/24 1012     Blood Culture, Routine No Growth to date      No Growth to date      No Growth to date      No Growth to date      No Growth to date    Narrative:      Lft forearm    Blood culture [[id number]] Collected:  06/10/24 0719    Order Status: Completed Specimen: Blood Updated: 06/14/24 1012     Blood Culture, Routine No Growth to date      No Growth to date      No Growth to date      No Growth to date      No Growth to date    Narrative:      Lft hand    Blood culture [[id number]] Collected: 06/13/24 0622    Order Status: Completed Specimen: Blood Updated: 06/14/24 0812     Blood Culture, Routine No Growth to date      No Growth to date    Blood culture [[id number]] Collected: 06/13/24 0622    Order Status: Completed Specimen: Blood Updated: 06/14/24 0812     Blood Culture, Routine No Growth to date      No Growth to date            Significant Imaging: I have reviewed all pertinent imaging results/findings within the past 24 hours.

## 2024-06-14 NOTE — SUBJECTIVE & OBJECTIVE
Interval History: continue on broadened AB (vanc/cefepime).  CT chest with ground glass opacities. ID continues to follow. Pending U/S upper and lower extremities. No new episodes of fever.     Review of Systems   Unable to perform ROS: Patient nonverbal     Objective:     Vital Signs (Most Recent):  Temp: 98.1 °F (36.7 °C) (06/14/24 1132)  Pulse: 81 (06/14/24 1132)  Resp: 16 (06/14/24 1132)  BP: 116/75 (06/14/24 1132)  SpO2: 100 % (06/14/24 1132) Vital Signs (24h Range):  Temp:  [98.1 °F (36.7 °C)-98.9 °F (37.2 °C)] 98.1 °F (36.7 °C)  Pulse:  [81-95] 81  Resp:  [16-19] 16  SpO2:  [97 %-100 %] 100 %  BP: ()/(61-78) 116/75     Weight: 105.5 kg (232 lb 9.4 oz)  Body mass index is 36.43 kg/m².  No intake or output data in the 24 hours ending 06/14/24 1337      Physical Exam  Vitals and nursing note reviewed.   Constitutional:       General: She is not in acute distress.     Appearance: She is not ill-appearing, toxic-appearing or diaphoretic.   HENT:      Head: Normocephalic and atraumatic.   Eyes:      General: No scleral icterus.        Right eye: No discharge.         Left eye: No discharge.      Conjunctiva/sclera: Conjunctivae normal.   Neck:      Comments: Decannulated  Cardiovascular:      Rate and Rhythm: Normal rate and regular rhythm.      Heart sounds: Normal heart sounds.   Pulmonary:      Effort: Pulmonary effort is normal. No respiratory distress.   Abdominal:      General: Abdomen is flat. There is no distension.      Tenderness: There is no abdominal tenderness.      Comments: PEG in place.      Musculoskeletal:      Right lower leg: No edema.      Left lower leg: No edema.   Skin:     General: Skin is warm and dry.   Neurological:      General: No focal deficit present.      Mental Status: She is alert.      Comments: Nonverbal             Significant Labs: All pertinent labs within the past 24 hours have been reviewed.    Significant Imaging: I have reviewed all pertinent imaging  results/findings within the past 24 hours.

## 2024-06-14 NOTE — PROGRESS NOTES
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 History was given from patient's daughter. She was undergoing dialysis today and noticed she was lethargic, less alert than usual self. Pt completed dialysis and still not acting herself. EMS was called, fever of a 100.  On chart review, she did have an episode of large volume emesis around 1700.  Per EMS, she had a slightly elevated temp 100.0°F, glucose 300s.     In the ED: UA 2+ leuks, >100 WBC, many bacteria, WBC 17, CT abd/pelvis concerning for cystitis. Given vanc/zosyn    Overview/Hospital Course:  Patient was admitted to Hospital Medicine service for medical management and evaluation of urosepsis. Patient was continued on vanc/zosyn. Vascular Neurology consulted concerning mental status change with the recommendation to initiate ASA 81 QD and to obtain an MRI to r/o new stroke. Imaging pending. Nephrology was consulted for regularly scheduled dialysis. Afternoon of 4/12, patient was unable to tolerate filtration of volume during dialsis 2/2 hypotension. Patient received 500cc bolus and was transferred back to floor after finishing the session without volume removed. Will monitor for signs of volume overload. Tailoring insulin regimen while uptitrating tube feeds to goal rate. Staph Epi in all 4 bottles; ID with recommendation to continue Vanc & de-escalate meropenem to ertapenem on 04/15 through 4/16. Patient completed IV course of UTI coverage. Patient tolerated volume removal well in  dialysis throughout the rest of her hospital stay. Pending discharge to LTACH pending bed availability. Patient without BM with one episode of vomiting overnight 4/17. KUB with bowel gas and low concern for obstruction with no transition point noted. Escalating bowel regimen. Pending placement as of 4/22. Pulm consult placed to evaluate for possible trach downsize & eventual decannulation to assist placement if safe. Trach capping trial per pulm for 48h and will assess for decannulation on Monday. DVT studies negative. Pulm successfully decannulated pt 4/30, IR exchanged TDC. Pending swallow study with SLP and waiting for dispo options. Pt failed swallow study, placement pending. Working with PT on mobilize pt to sitting in a chair to expand placement options. Pt successfully mobilized using sandee lift but required 2 people to do so. Discussing dispo plan with family, likely d/c home if HD, DME needs able to be met. Pending acceptance at outpatient HD center. Ongoing placement difficulties d/t need for accepting HD facility to have sandee lift with 2 personnel to operate. Family meeting to discuss disposition options planned for Thursday 5/23 at 1 PM. SW onboard for placement to [facility] to continue dialysis. Once patient gets accepted at [facility], next step will be to work with daughter on nursing home NH placement. Hyperkalamic, given lokelma x1. Hyperkalemic, shifted K with lokelma x 1 and regular insulin. Nephrology to take on dyalisis today. Optimizing insulin BG and insulin management. SW onboard working on paperwork for long term Medicaid application. CT abdomen with correctly placed PEG tube. Re-started TF and ASA. Pending NH placement. Finished vanc. Will transition Zosyn to augmentin per PEG tube. Pending Daughter to sign [facility] documentation as per SW. New episode of fever overnight. Started on vanc/zosyn. Repeated blood cultures, normal lactate. Pending CXR. Re-broadened during the morning  with meropenem with ID consult. ID recommending to continue vanc and meropenem with CT chest showing bilateral ground glass opacities. Pending U/S extremities.     Interval History: continue on broadened AB (vanc/cefepime).  CT chest with ground glass opacities. ID continues to follow. Pending U/S upper and lower extremities. No new episodes of fever.     Review of Systems   Unable to perform ROS: Patient nonverbal     Objective:     Vital Signs (Most Recent):  Temp: 98.1 °F (36.7 °C) (06/14/24 1132)  Pulse: 81 (06/14/24 1132)  Resp: 16 (06/14/24 1132)  BP: 116/75 (06/14/24 1132)  SpO2: 100 % (06/14/24 1132) Vital Signs (24h Range):  Temp:  [98.1 °F (36.7 °C)-98.9 °F (37.2 °C)] 98.1 °F (36.7 °C)  Pulse:  [81-95] 81  Resp:  [16-19] 16  SpO2:  [97 %-100 %] 100 %  BP: ()/(61-78) 116/75     Weight: 105.5 kg (232 lb 9.4 oz)  Body mass index is 36.43 kg/m².  No intake or output data in the 24 hours ending 06/14/24 1337      Physical Exam  Vitals and nursing note reviewed.   Constitutional:       General: She is not in acute distress.     Appearance: She is not ill-appearing, toxic-appearing or diaphoretic.   HENT:      Head: Normocephalic and atraumatic.   Eyes:      General: No scleral icterus.        Right eye: No discharge.         Left eye: No discharge.      Conjunctiva/sclera: Conjunctivae normal.   Neck:      Comments: Decannulated  Cardiovascular:      Rate and Rhythm: Normal rate and regular rhythm.      Heart sounds: Normal heart sounds.   Pulmonary:      Effort: Pulmonary effort is normal. No respiratory distress.   Abdominal:      General: Abdomen is flat. There is no distension.      Tenderness: There is no abdominal tenderness.      Comments: PEG in place.      Musculoskeletal:      Right lower leg: No edema.      Left lower leg: No edema.   Skin:     General: Skin is warm and dry.   Neurological:      General: No focal deficit present.      Mental Status: She is alert.      Comments: Nonverbal              Significant Labs: All pertinent labs within the past 24 hours have been reviewed.    Significant Imaging: I have reviewed all pertinent imaging results/findings within the past 24 hours.    Assessment/Plan:      * Acute cystitis with hematuria  Resolved       Vulvovaginal candidiasis  Noted 5/29, given 150mg fluconazole x1      Irritant contact dermatitis due friction or contact with other specified body fluids  Wound care following       Debility  Needs:  Wheelchair: patient has a mobility limitation that significantly impairs her ability to participate in one or more mobility related activities of daily living (MRADL's) such as toileting, feeding, dressing, grooming, and bathing in customary locations in the home.  The mobility limitation cannot be sufficiently resolved by the use of a cane or walker.   The use of a manual wheelchair will significantly improve the patient's ability to participate in MRADLS and the patient will use it on regular basis in the home.  Family/pt has expressed her willingness to use a manual wheelchair in the home.  She also has a caregiver who is capable of assisting in mobility.    Mrs Saravia requires a hospital bed due to her requiring positioning of the body in ways not feasible with an ordinary bed to alleviate pain/ is completely immobile /or limited mobility and cannot independently make changes in body position without the use of the bed.  The positioning of the body cannot be sufficiently resolved by the use of pillows and wedges    Patient has a mobility limitation that significantly impairs their ability to participate in one or more mobility related activities of daily living, including toileting. This deficit can be resolved by using a bedside commode. Patient demonstrates mobility limitations that will cause them to be confined to one room at home without bathroom access for up to 30 days. Using a bedside commode will greatly improve the patient's ability to  participate in MRADLs       Complication of vascular dialysis catheter  Cath intermittently clogging, not resolving with cath-hedy, going for IR venogram 4/30 with possible exchange. S/p exchange with IR on 4/30      Constipation  Resolved     Moderate malnutrition  Nutrition consulted. Most recent weight and BMI monitored-     Measurements:  Wt Readings from Last 1 Encounters:   06/11/24 105.5 kg (232 lb 9.4 oz)   Body mass index is 36.43 kg/m².    Patient has been screened and assessed by RD.    Malnutrition Type:  Context: acute illness or injury  Level: moderate    Malnutrition Characteristic Summary:  Weight Loss (Malnutrition): 10% in 6 months  Subcutaneous Fat (Malnutrition): mild depletion  Muscle Mass (Malnutrition): mild depletion  Fluid Accumulation (Malnutrition): mild    Interventions/Recommendations (treatment strategy):  1.  - restarted TF  - previous history of failed swallow studies    Prolonged QT interval  Qtc 526 [04/10/24]      limit Qtc prolonging drugs as able    Spastic hemiplegia of right dominant side as late effect of cerebrovascular disease  Important that patient is able to sit in chair for 4h for dialysis placement needs, even if she requires lift/assistance getting into the chair     This patient has Chronic right hemiplegia due to stroke. Physical therapy services has been scheduled. Continue all standard measures for pressure injury prevention and consult wound care for any wounds (chronic or acute).    ESRD (end stage renal disease) on dialysis  Creatine stable for now. BMP reviewed- noted Estimated Creatinine Clearance: 15.1 mL/min (A) (based on SCr of 5.4 mg/dL (H)). according to latest data. Based on current GFR, CKD stage is end stage.  Monitor UOP and serial BMP and adjust therapy as needed. Renally dose meds. Avoid nephrotoxic medications and procedures.    SW onboard for placement to [facility] to continue dialysis. Once patient gets accepted at [facility], next step will  be to work with daughter on longterm NH placement.     -- HD MWF (~1L fluid removal on average per session)  -- nephro following    PEG (percutaneous endoscopic gastrostomy) status  On PEG tube.Wound care with PEG dressing changes.   - CT abdomen with PEG in place.  - TF         Status post tracheostomy  Resolved, decannulated 4/30    Leukocytosis  Downtrending.       Acute encephalopathy  Resolved.     Type 2 diabetes mellitus with hyperglycemia, with long-term current use of insulin  Adjusting insulin to account for diabetic TF    Patient's FSGs are controlled on current medication regimen.  Last A1c reviewed-   Lab Results   Component Value Date    HGBA1C 6.2 (H) 05/27/2024     Most recent fingerstick glucose reviewed-   Recent Labs   Lab 06/13/24  1649 06/13/24  2057 06/14/24  0906 06/14/24  1133   POCTGLUCOSE 132* 117* 116* 126*       Current correctional scale  Medium  Maintain anti-hyperglycemic dose as follows-   Antihyperglycemics (From admission, onward)      Start     Stop Route Frequency Ordered    06/09/24 2100  insulin glargine U-100 (Lantus) pen 13 Units         -- SubQ Nightly 06/09/24 0756    06/09/24 1255  insulin aspart U-100 pen 0-10 Units         -- SubQ Before meals & nightly PRN 06/09/24 1155          Hold Oral hypoglycemics while patient is in the hospital.  POCT glucose checks   -tube feeds     Hyponatremia  Patient has hyponatremia which is uncontrolled,We will aim to correct the sodium by 4-6mEq in 24 hours. We will monitor sodium Daily. The hyponatremia is due to ESRD. Discussed with nephrology, dialysate bath adjusted to account for and correct hyponatremia.  Recent Labs   Lab 06/14/24  0829   *       - Daily morning CMP  - Continue to montior    Sepsis  This patient does have evidence of infective focus  My overall impression is sepsis.  Source: Skin and Soft Tissue (location Abdominal)  Antibiotics given-   Antibiotics (72h ago, onward)      Start     Stop Route Frequency  "Ordered    06/14/24 1600  vancomycin 750 mg in dextrose 5 % (D5W) 250 mL IVPB (Vial-Mate)         -- IV Once 06/14/24 0733    06/14/24 0740  vancomycin - pharmacy to dose  (vancomycin IVPB (PEDS and ADULTS))        Placed in "And" Linked Group    -- IV pharmacy to manage frequency 06/14/24 0640    06/13/24 1115  meropenem (MERREM) 500 mg in sodium chloride 0.9 % 100 mL IVPB (MB+)         -- IV Every 12 hours (non-standard times) 06/13/24 1007          Latest lactate reviewed-  Recent Labs   Lab 06/13/24  0850   LACTATE 1.2         Fluid challenge Contraindicated- Fluid bolus is contraindicated in this patient due to End Stage Renal Disease     Post- resuscitation assessment No - Post resuscitation assessment not needed       Will Not start Pressors- Levophed for MAP of 65  Source control achieved by: vanc/meropenem     Plan:  - Concerns septic source may be PEG site with associated purulent wound despite Wound Care attempts to protect with barriers. PEG placed by General Surgery 2/2024. General Surgery consulted regarding PEG site ordered CT abdomen. Currently in place.   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 Pt underwent extensive course, currently still healing from this, but no longer has wound vac. Pt's wound currently covered with bandage. Picture in media tabs    Plan  Wound care        VTE Risk Mitigation (From admission, onward)           Ordered     heparin (porcine) injection 1,000 Units  As needed (PRN)         06/10/24 0035     heparin (porcine) injection 5,000 Units  Every 8 hours         05/29/24 0823     heparin (porcine) injection 3,000 Units  As needed (PRN)         04/17/24 0825                    Discharge Planning   ZEKE: 6/17/2024     Code Status: Full Code   Is the patient medically ready for discharge?: No    Reason for patient " still in hospital (select all that apply): Patient trending condition  Discharge Plan A: New Nursing Home placement - shelter care facility ([facility])        Steven Miranda MD  Department of Hospital Medicine   [facility] melecio - Telemetry Stepdown

## 2024-06-14 NOTE — ASSESSMENT & PLAN NOTE
53F with prolonged hospitalization, PMH including ros's gangrene (1/24), CVA with deficits (nonverbal), requiring trach/PEG, DM, ESRD on HD MWF, who [place] [facility] [place] ( in [place]). Hospitalization was c/b urosepsis, in which pt completed carbapenem course on 4/16. Trach was capped and later pt was decannulated on 4/30 which was successful. Pt remained inpatient while awaiting dispo plans, as well as requiring mgmt for lyte imbalances and dysphagia.   Pt was restarted on antibiotics on 6/10 per primary team as pt began to be tachycardic, slight leukocytosis, and low grade fever. General surgery was consulted d/t concerns with g tube. Recommended CT AP which was overall unremarkable. GTube in place. No intraabdominal collections noted.  Now with increasing white count and fever. Escalated to meropenem. Blood cultures obtained, negative. Chest xray with hypoareated lungs, as well as accentuation of pulmonary vascular and interstitial markings. Without consolidation. CT chest noting mosaic pulmonary attenuation, nonspecific findings. No consolidation seen.     Afebrile. HDS. White count normalized, 22k - 10k. RIP negative. Continues on meropenem.       Recommendations:   - okay to continue vanc, meropenem x5 days. Suspect aspiration pneumonia with pt history, though not suggestive on CT. Can clinically treat and monitor for progress.   - plan reviewed with ID staff. ID will sign off. Please re consult with infectious concerns, new culture data.

## 2024-06-14 NOTE — PLAN OF CARE
Call received from [facility] ([phone]) informing this CM that they can accept patient today and she can start dialysis on Monday. IM4 team aware of conversation and CM inquired if patient was stable for discharge at this time. Will continue to follow.    13:10PM  CM informed that Ms. Saravia is not stable for discharge today. Call placed to [facility] and they were updated on the above conversation.    Sara Loredo RN  Ext [phone]

## 2024-06-14 NOTE — PLAN OF CARE
Problem: Occupational Therapy  Goal: Occupational Therapy Goal  Description: Goals to be met by: 5/22/24 Goals revised as needed on 05-30 to be met by 06-19:    Patient will increase functional independence with ADLs by performing:    UE Dressing with Maximum Assistance.MET 05-30  Revised: UBD with Min A  Grooming while EOB with Maximum Assistance.Met 05-30  Revised: Grooming seated EOB with CGA - ONGOING  Sitting at edge of bed x5 minutes with Maximum Assistance. - Met 5/22  Revised: Sit EOB x 20 minutes with SBA  MET 06-10-24  Rolling to Bilateral with Moderate Assistance.NOT MET     Supine to sit with Maximum Assistance. NOT MET      Outcome: Progressing

## 2024-06-14 NOTE — ASSESSMENT & PLAN NOTE
Patient has hyponatremia which is uncontrolled,We will aim to correct the sodium by 4-6mEq in 24 hours. We will monitor sodium Daily. The hyponatremia is due to ESRD. Discussed with nephrology, dialysate bath adjusted to account for and correct hyponatremia.  Recent Labs   Lab 06/14/24  0829   *       - Daily morning CMP  - Continue to montior

## 2024-06-14 NOTE — PROGRESS NOTES
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 Hospitalization was c/b urosepsis, in which pt completed carbapenem course on 4/16. Trach was capped and later pt was decannulated on 4/30 which was successful. Pt remained inpatient while awaiting dispo plans, as well as requiring mgmt for lyte imbalances and dysphagia.   Pt was restarted on antibiotics on 6/10 per primary team as pt began to be tachycardic, slight leukocytosis, and low grade fever. General surgery was consulted d/t concerns with g tube. Recommended CT AP which was overall unremarkable. GTube in place. No intraabdominal collections noted.  Now with increasing white count and fever. Escalated to meropenem. Blood cultures obtained, negative. Chest xray with hypoareated lungs, as well as accentuation of pulmonary vascular and interstitial markings. Without consolidation. CT chest noting mosaic pulmonary attenuation, nonspecific findings. No consolidation seen.     Afebrile. HDS. White count normalized, 22k - 10k. RIP negative. Continues on meropenem.       Recommendations:   - okay to continue vanc, meropenem x5 days. Suspect aspiration pneumonia with pt history, though not suggestive on CT. Can clinically treat and monitor for progress.   - plan reviewed with ID staff. ID will sign off. Please re consult with infectious concerns, new culture data.               Thank you for your consult. I will sign off. Please contact us if you have any additional questions.    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 Reportedly patient was less responsive than her baseline had an episode of emesis, in the ED she had a CT abdomen/pelvis concerning for cystitis as well as a UA (obtained via straight cath) concerning for a UTI. She was initially started on vancomycin and Zosyn later broadened to meropenem. On presentation her labs were notable for leukocytosis of 17, as well as a lactic acidosis of 3.9 that has improved to 2.4 with the administration of 1 L of IV fluids, we are being cautious with fluid repletion given her end-stage renal disease and oliguric status. ID is now consulted for abrupt increase in WBC to 22 w/ associated fever. Daughter at bedside notes that patient had a few episodes of vomiting 2 days ago after initiation of tube feeds, and feels her breathing pattern is different today. Patient is unable to answer questions. CXR  w/ increased vascular congestion. She has been restarted on broad spectrum abx.   Interval hx:    CT chest without consolidation. RIP negative. Pt clinically improving on meropenem.       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    Hypermagnesemia 04/11/2024    POA, Mg 3.2  Daily chem       Morbid obesity     Necrotizing fasciitis        Past Surgical History:   Procedure Laterality Date    CLOSURE OF WOUND Right 2/22/2024    Procedure: CLOSURE, WOUND;  Surgeon: Steve Cole MD;  [place] [facility] [place] [address] [place] [address];  Service: General;  Laterality: Right;    ESOPHAGOGASTRODUODENOSCOPY W/ PEG N/A 2/22/2024    Procedure: EGD, WITH PEG  TUBE INSERTION;  Surgeon: Steve Cole MD;  Location: [facility] OR 2ND FLR;  Service: General;  Laterality: N/A;    INCISION AND DRAINAGE OF PERIRECTAL REGION N/A 1/29/2024    Procedure: INCISION AND DRAINAGE, PERIRECTAL REGION;  Surgeon: Axel Ramsay MD;  Location: [facility] OR;  Service: General;  Laterality: N/A;    LUMBAR PUNCTURE N/A 2/16/2024    Procedure: Lumbar Puncture;  Surgeon: Macy Boykin;  Location: [facility];  Service: Anesthesiology;  Laterality: N/A;    PLACEMENT, TRIALYSIS CATH Right 1/31/2024    Procedure: INSERTION, CATHETER, TRIPLE LUMEN, HEMODIALYSIS, TEMPORARY;  Surgeon: Alvin Junior MD;  Location: [facility] OR;  Service: General;  Laterality: Right;    REPLACEMENT OF WOUND VACUUM-ASSISTED CLOSURE DEVICE Right 2/12/2024    Procedure: REPLACEMENT, WOUND VAC;  Surgeon: Mundo Carmona MD;  [facility];  Service: General;  Laterality: Right;    REPLACEMENT OF WOUND VACUUM-ASSISTED CLOSURE DEVICE N/A 2/15/2024    Procedure: REPLACEMENT, WOUND VAC;  Surgeon: Steve Cole MD;  Location: [facility] OR 2ND FLR;  Service: General;  Laterality: N/A;    REPLACEMENT OF WOUND VACUUM-ASSISTED CLOSURE DEVICE Right 2/19/2024    Procedure: REPLACEMENT, WOUND VAC;  Surgeon: Steve Cole MD;  Location: [facility] OR 2ND FLR;  Service: General;  Laterality: Right;  RLE/groin    REPLACEMENT OF WOUND VACUUM-ASSISTED CLOSURE DEVICE Right 2/22/2024    Procedure: REPLACEMENT, WOUND VAC;  Surgeon: Steve Cole MD;  Location: [facility] OR 2ND FLR;  Service: General;  Laterality: Right;    TRACHEOSTOMY N/A 2/22/2024    Procedure: CREATION, TRACHEOSTOMY;  Surgeon: Steve Cole MD;  Location: [facility] OR 2ND FLR;  Service: General;  Laterality: N/A;    WOUND DEBRIDEMENT Bilateral 2/2/2024    Procedure: DEBRIDEMENT, WOUND;  Surgeon: Steve Cole MD;  Location: [facility] OR 2ND FLR;  Service: General;  Laterality: Bilateral;  Bilateral groin  Possible wound vac placement    WOUND DEBRIDEMENT Right 2/6/2024    Procedure:  DEBRIDEMENT, WOUND, replace wound vac, possible closure;  Surgeon: Steve Cole MD;  Location: [facility] OR 2ND FLR;  Service: General;  Laterality: Right;  RLE    WOUND DEBRIDEMENT Right 2/9/2024    Procedure: DEBRIDEMENT, WOUND w wound vac change;  Surgeon: Steve Cole MD;  Location: [facility] OR 2ND FLR;  Service: General;  Laterality: Right;  RLE    WOUND DEBRIDEMENT Right 2/12/2024    Procedure: R thigh wound debridement;  Surgeon: Mundo Carmona MD;  Location: [facility] OR 2ND FLR;  Service: General;  Laterality: Right;    WOUND EXPLORATION Right 1/31/2024    Procedure: IRRIGATION & DEBRIDEMENT, WOUND DEBRIDEMENT;  Surgeon: Alvin Junior MD;  Location: [facility] OR;  Service: General;  Laterality: Right;       Review of patient's allergies indicates:  No Known Allergies    Medications:  Medications Prior to Admission   Medication Sig    ascorbic acid, vitamin C, (VITAMIN C) 500 MG tablet 500 mg by Per G Tube route 2 (two) times daily.    pantoprazole sodium (PANTOPRAZOLE ORAL) 40 mg once daily. Liquid via gtube    [DISCONTINUED] amLODIPine (NORVASC) 10 MG tablet 10 mg by Per G Tube route once daily. 0900    [DISCONTINUED] carvediloL (COREG) 25 MG tablet 25 mg by Per G Tube route 2 (two) times daily with meals.    [DISCONTINUED] chlorhexidine (PERIDEX) 0.12 % solution Use as directed 15 mLs in the mouth or throat 2 (two) times daily.    [DISCONTINUED] heparin sodium,porcine (HEPARIN, PORCINE,) 5,000 unit/mL injection Inject 7,500 Units into the skin every 8 (eight) hours.    [DISCONTINUED] insulin aspart U-100 (NOVOLOG) 100 unit/mL injection Inject 0-10 Units into the skin as needed for High Blood Sugar. Moderate correction dose sliding scale    [DISCONTINUED] insulin detemir U-100 (LEVEMIR) 100 unit/mL injection Inject 25 Units into the skin 2 (two) times a day.    [DISCONTINUED] psyllium (KONSYL) Powd 1 packet by Per G Tube route once daily.    [DISCONTINUED] sevelamer carbonate (RENVELA) 2.4 gram PwPk 2,400 mg by  "Per G Tube route 3 (three) times daily.    [DISCONTINUED] zinc sulfate (ZINCATE) 50 mg zinc (220 mg) capsule 220 mg by Per G Tube route once daily.     Antibiotics (From admission, onward)      Start     Stop Route Frequency Ordered    06/14/24 2000  vancomycin 750 mg in dextrose 5 % (D5W) 250 mL IVPB (Vial-Mate)         -- IV Once 06/14/24 1413    06/14/24 0740  vancomycin - pharmacy to dose  (vancomycin IVPB (PEDS and ADULTS))        Placed in "And" Linked Group    -- IV pharmacy to manage frequency 06/14/24 0640    06/13/24 1115  meropenem (MERREM) 500 mg in sodium chloride 0.9 % 100 mL IVPB (MB+)         -- IV Every 12 hours (non-standard times) 06/13/24 1007          Antifungals (From admission, onward)      None          Antivirals (From admission, onward)      None             Immunization History   Administered Date(s) Administered    PPD Test 08/16/2022, 04/23/2024, 05/16/2024, 05/29/2024    Td (ADULT) 11/16/2005       Family History    None       Social History     Socioeconomic History    Marital status: Single   Tobacco Use    Smoking status: Unknown     Social Determinants of Health     Financial Resource Strain: Patient Unable To Answer (3/14/2024)    Overall Financial Resource Strain (CARDIA)     Difficulty of Paying Living Expenses: Patient unable to answer   Recent Concern: Financial Resource Strain - High Risk (3/9/2024)    Overall Financial Resource Strain (CARDIA)     Difficulty of Paying Living Expenses: Very hard   Food Insecurity: Patient Unable To Answer (3/14/2024)    Hunger Vital Sign     Worried About Running Out of Food in the Last Year: Patient unable to answer     Ran Out of Food in the Last Year: Patient unable to answer   Transportation Needs: Patient Unable To Answer (3/14/2024)    PRAPARE - Transportation     Lack of Transportation (Medical): Patient unable to answer     Lack of Transportation (Non-Medical): Patient unable to answer   Physical Activity: Inactive (3/13/2024)    " Exercise Vital Sign     Days of Exercise per Week: 0 days     Minutes of Exercise per Session: 0 min   Stress: Patient Unable To Answer (3/14/2024)    [employer] - Occupational Stress Questionnaire     Feeling of Stress : Patient unable to answer   Housing Stability: Patient Unable To Answer (3/14/2024)    Housing Stability Vital Sign     Unable to Pay for Housing in the Last Year: Patient unable to answer     Number of Places Lived in the Last Year: 1     Unstable Housing in the Last Year: Patient unable to answer   Recent Concern: Housing Stability - High Risk (3/9/2024)    Housing Stability Vital Sign     Unable to Pay for Housing in the Last Year: Yes     Unstable Housing in the Last Year: No     Review of Systems   Unable to perform ROS: Patient nonverbal     Objective:     Vital Signs (Most Recent):  Temp: 98.1 °F (36.7 °C) (06/14/24 1132)  Pulse: 92 (06/14/24 1530)  Resp: 16 (06/14/24 1132)  BP: 116/68 (06/14/24 1530)  SpO2: 100 % (06/14/24 1132) Vital Signs (24h Range):  Temp:  [98.1 °F (36.7 °C)-98.9 °F (37.2 °C)] 98.1 °F (36.7 °C)  Pulse:  [81-95] 92  Resp:  [16-19] 16  SpO2:  [97 %-100 %] 100 %  BP: ()/(61-82) 116/68     Weight: 105.5 kg (232 lb 9.4 oz)  Body mass index is 36.43 kg/m².    Estimated Creatinine Clearance: 15.1 mL/min (A) (based on SCr of 5.4 mg/dL (H)).     Physical Exam  Vitals reviewed.   Constitutional:       General: She is not in acute distress.     Appearance: She is well-developed. She is not ill-appearing or diaphoretic.   HENT:      Head: Normocephalic and atraumatic.      Right Ear: External ear normal.      Left Ear: External ear normal.      Nose: Nose normal.   Eyes:      General: No scleral icterus.        Right eye: No discharge.         Left eye: No discharge.      Extraocular Movements: Extraocular movements intact.      Conjunctiva/sclera: Conjunctivae normal.   Cardiovascular:      Rate and Rhythm: Normal rate and regular rhythm.       Pulses: Normal pulses.      Heart sounds: No murmur heard.     No friction rub.   Pulmonary:      Effort: Pulmonary effort is normal. No respiratory distress.      Breath sounds: Normal breath sounds. No stridor. No wheezing.      Comments: On room air  Abdominal:      General: Abdomen is flat. There is no distension.      Palpations: Abdomen is soft. There is no mass.      Tenderness: There is no abdominal tenderness.      Comments: PEG tube w/o issue   Musculoskeletal:         General: Normal range of motion.   Skin:     Findings: No erythema or rash.      Comments: R chest HD line w/o evidence of infection   Neurological:      Mental Status: She is alert. Mental status is at baseline.      Comments: Awake and alert, does not verbalize          Significant Labs: CBC:   Recent Labs   Lab 06/13/24  0622 06/14/24  0605   WBC 22.66* 10.40   HGB 8.3* 7.9*   HCT 27.1* 25.8*    255     CMP:   Recent Labs   Lab 06/13/24  0850 06/13/24  1634 06/13/24  2249 06/14/24  0829   *  --   --  128*   K 4.2  4.1 3.5 3.2* 3.3*  3.3*   CL 93*  --   --  90*   CO2 24  --   --  24   *  --   --  97   BUN 17  --   --  26*   CREATININE 4.3*  --   --  5.4*   CALCIUM 8.9  --   --  9.0   PROT 7.1  --   --  6.8   ALBUMIN 2.7*  --   --  2.6*   BILITOT 0.4  --   --  0.3   ALKPHOS 74  --   --  63   AST 29  --   --  27   ALT 32  --   --  26   ANIONGAP 13  --   --  14     Microbiology Results (last 7 days)       Procedure Component Value Units Date/Time    Respiratory Infection Panel (PCR), Nasopharyngeal [[id number]] Collected: 06/14/24 0922    Order Status: Completed Specimen: Nasopharyngeal Swab Updated: 06/14/24 1117     Respiratory Infection Panel Source NP Swab     Adenovirus Not Detected     Coronavirus 229E, Common Cold Virus Not Detected     Coronavirus HKU1, Common Cold Virus Not Detected     Coronavirus NL63, Common Cold Virus Not Detected     Coronavirus OC43, Common Cold Virus Not Detected     Comment: The  Coronavirus strains detected in this test cause the common cold.  These strains are not the COVID-19 (novel Coronavirus)strain   associated with the respiratory disease outbreak.          SARS-CoV2 (COVID-19) Qualitative PCR Not Detected     Human Metapneumovirus Not Detected     Human Rhinovirus/Enterovirus Not Detected     Influenza A (subtypes H1, H1-2009,H3) Not Detected     Influenza B Not Detected     Parainfluenza Virus 1 Not Detected     Parainfluenza Virus 2 Not Detected     Parainfluenza Virus 3 Not Detected     Parainfluenza Virus 4 Not Detected     Respiratory Syncytial Virus Not Detected     Bordetella Parapertussis (GW7782) Not Detected     Bordetella pertussis (ptxP) Not Detected     Chlamydia pneumoniae Not Detected     Mycoplasma pneumoniae Not Detected    Narrative:      Assay not valid for lower respiratory specimens, alternate  testing required.    Respiratory Infection Panel (PCR), Nasopharyngeal [[id number]]     Order Status: No result Specimen: Nasopharyngeal Swab     Blood culture [[id number]] Collected: 06/10/24 0719    Order Status: Completed Specimen: Blood Updated: 06/14/24 1012     Blood Culture, Routine No Growth to date      No Growth to date      No Growth to date      No Growth to date      No Growth to date    Narrative:      Lft forearm    Blood culture [[id number]] Collected: 06/10/24 0719    Order Status: Completed Specimen: Blood Updated: 06/14/24 1012     Blood Culture, Routine No Growth to date      No Growth to date      No Growth to date      No Growth to date      No Growth to date    Narrative:      Lft hand    Blood culture [[id number]] Collected: 06/13/24 0622    Order Status: Completed Specimen: Blood Updated: 06/14/24 0812     Blood Culture, Routine No Growth to date      No Growth to date    Blood culture [[id number]] Collected: 06/13/24 0622    Order Status: Completed Specimen: Blood Updated: 06/14/24 0812     Blood Culture, Routine No Growth to date      No Growth  to date            Significant Imaging: I have reviewed all pertinent imaging results/findings within the past 24 hours.

## 2024-06-14 NOTE — PT/OT/SLP PROGRESS
Physical Therapy Treatment  + 6th visit    Patient Name:  Sarah Saravia   MRN:  [id number]    Recommendations:     Discharge Recommendations: Moderate Intensity Therapy  Discharge Equipment Recommendations: hospital bed, lift device, wheelchair  Barriers to discharge:  increased level of skilled  assist    Assessment:     Sarah Saravia is a 53 y.o. female admitted with a medical diagnosis of Acute cystitis with hematuria.  She presents with the following impairments/functional limitations: weakness, impaired endurance, impaired self care skills, impaired functional mobility, impaired balance, impaired cognition, decreased coordination, decreased upper extremity function, decreased lower extremity function, decreased safety awareness, decreased ROM, impaired coordination, impaired skin Patient progressing with ability to sit EOB with SBA. Patient with eyes open throughout 100% of session, requiring total A x2 to transition to sitting EOB. Significant assist to scoot to HOB R laterally x8 attempts. Will continue to benefit from skilled PT services.    Rehab Prognosis: Fair; patient would benefit from acute skilled PT services to address these deficits and reach maximum level of function.    Recent Surgery: * No surgery found *      Plan:     During this hospitalization, patient to be seen 3 x/week to address the identified rehab impairments via gait training, therapeutic activities, therapeutic exercises, neuromuscular re-education and progress toward the following goals:    Plan of Care Expires:  06/22/24    Subjective     Chief Complaint: no verbalizations this date, grimacing with knee pain  Patient/Family Comments/goals: no verbalizations this date  Pain/Comfort:  Pain Rating 1:  (patient unable to rate)  Location - Side 1: Bilateral  Location - Orientation 1: generalized  Location 1: knee  Pain Addressed 1: Nurse notified, Cessation of Activity, Distraction, Reposition  Pain Rating Post-Intervention 1:  (no  pain rating stated, grimacing + rubbing knees)      Objective:     Communicated with RN prior to session.  Patient found HOB elevated with PEG Tube, telemetry upon PT entry to room.     General Precautions: Standard, aphasia, aspiration, contact, fall, NPO  Orthopedic Precautions: N/A  Braces: N/A  Respiratory Status: Room air     Functional Mobility:  Bed Mobility:     Rolling Left:  total assistance and of 2 persons  Rolling Right: total assistance and of 2 persons  Scooting: total assistance and of 2 persons x8 scoots R laterally to improve hip positioning in bed in seateed position  Supine to Sit: total assistance and of 2 persons  Sit to Supine: total assistance and of 2 persons  Balance: Sitting: at EOB: SBA. Inconsistently able to utilize core musculature to correct posture with verbal/tactile cues      AM-PAC 6 CLICK MOBILITY  Turning over in bed (including adjusting bedclothes, sheets and blankets)?: 2  Sitting down on and standing up from a chair with arms (e.g., wheelchair, bedside commode, etc.): 1  Moving from lying on back to sitting on the side of the bed?: 2  Moving to and from a bed to a chair (including a wheelchair)?: 1  Need to walk in hospital room?: 1  Climbing 3-5 steps with a railing?: 1  Basic Mobility Total Score: 8       Treatment & Education:  Co-treatment with OT due to patient's poor activity tolerance and medical complexity requiring skilled assistance from 2 therapists.  Patient educated on calling for assistance for any needs to improve overall safety awareness.   Patient educated on current level of function and progression towards therapeutic goals.    Patient left HOB elevated with all lines intact, call button in reach, and RN notified..    GOALS:   Multidisciplinary Problems       Physical Therapy Goals          Problem: Physical Therapy    Goal Priority Disciplines Outcome Goal Variances Interventions   Physical Therapy Goal     PT, PT/OT Progressing     Description: Goals to  be met by: 24   Goals remain appropriate 5/3/2024 to be met by 24  Goals updated 2024 to be met by 24  Goals updated 24 to be met by 24  Goals remain appropriate 24 to be met by 24    Patient will increase functional independence with mobility by performin. Supine to sit with Maximum Assistance  2. Sit to supine with Maximum Assistance  3. Rolling to Left and Right with Moderate Assistance.  4. Sitting at edge of bed 5 minutes with Moderate Assistance- MET , monitor consistency  4a. Sitting at edge of bed 10 minutes with Supervision  5. Lower extremity exercise program x20 reps per handout, with assistance as needed  6. Sit to stand transfer with Maximum Assistance with or without LRAD  7. Stand for 2 minutes with Maximum Assistance with or without LRAD                         Time Tracking:     PT Received On: 24  PT Start Time: 1129     PT Stop Time: 1207  PT Total Time (min): 38 min     Billable Minutes: Therapeutic Activity 8 and Neuromuscular Re-education 30       PT/PTA: PT     Number of PTA visits since last PT visit: 0     2024

## 2024-06-14 NOTE — NURSING
Nurses Note -- 4 Eyes      6/14/2024   7:00M      Skin assessed during: Q Shift Change      [] No Altered Skin Integrity Present    []Prevention Measures Documented      [x] Yes- Altered Skin Integrity Present or Discovered   [] LDA Added if Not in Epic (Describe Wound)   [] New Altered Skin Integrity was Present on Admit and Documented in LDA   [] Wound Image Taken    Wound Care Consulted? Yes    Attending Nurse:  ELISABETH Puente    Second RN/Staff Member:  ELISABETH Wolfe

## 2024-06-14 NOTE — PROGRESS NOTES
Pharmacokinetic Assessment Follow Up: IV Vancomycin    Vancomycin serum concentration assessment/plan(s):    -Vancomycin pre-HD level 13.3, slightly below goal of 15 - 20 for PNA  -Patient due for HD today  -Administer vancomycin 750 mg IVPB x1 post dialysis today  -Next random level Monday as pre-HD level    Drug levels (last 3 results):  Recent Labs   Lab Result Units 06/13/24  0622 06/14/24  0605   Vancomycin, Random ug/mL 14.1 13.3       Pharmacy will continue to follow and monitor vancomycin.    Please contact pharmacy at extension [phone] for questions regarding this assessment.    Thank you for the consult,   Brett Joshkade       Patient brief summary:  Sarah Saravia is a 53 y.o. female initiated on antimicrobial therapy with IV Vancomycin for treatment of lower respiratory infection    The patient's current regimen is vancomycin pulse dosing    Drug Allergies:   Review of patient's allergies indicates:  No Known Allergies    Actual Body Weight:   106 kg    Renal Function:   Estimated Creatinine Clearance: 15.1 mL/min (A) (based on SCr of 5.4 mg/dL (H)).,     Dialysis Method (if applicable):  N/A    CBC (last 72 hours):  Recent Labs   Lab Result Units 06/12/24  0405 06/13/24  0622 06/14/24  0605   WBC K/uL 5.79 22.66* 10.40   Hemoglobin g/dL 8.8* 8.3* 7.9*   Hematocrit % 29.5* 27.1* 25.8*   Platelets K/uL 226 240 255   Gran % % 45.1 87.2* 68.4   Lymph % % 27.3 4.1* 13.8*   Mono % % 17.1* 7.4 13.1   Eosinophil % % 9.7* 0.3 3.8   Basophil % % 0.5 0.3 0.5   Differential Method  Automated Automated Automated       Metabolic Panel (last 72 hours):  Recent Labs   Lab Result Units 06/11/24  1735 06/12/24  0035 06/12/24  0405 06/12/24  0837 06/12/24 2008 06/12/24  2350 06/13/24  0622 06/13/24  0850 06/13/24  1634 06/13/24  2249 06/14/24  0605 06/14/24  0829   Sodium mmol/L  --   --  127*  --   --   --   --  130*  --   --   --  128*   Potassium mmol/L 4.6 3.9 4.0 4.5 4.3 3.1*  --  4.2  4.1 3.5 3.2*  --  3.3*  3.3*    Chloride mmol/L  --   --  91*  --   --   --   --  93*  --   --   --  90*   CO2 mmol/L  --   --  21*  --   --   --   --  24  --   --   --  24   Glucose mg/dL  --   --  101  --   --   --   --  139*  --   --   --  97   BUN mg/dL  --   --  38*  --   --   --   --  17  --   --   --  26*   Creatinine mg/dL  --   --  6.3*  --   --   --   --  4.3*  --   --   --  5.4*   Albumin g/dL  --   --  2.7*  --   --   --   --  2.7*  --   --   --  2.6*   Total Bilirubin mg/dL  --   --  0.3  --   --   --   --  0.4  --   --   --  0.3   Alkaline Phosphatase U/L  --   --  67  --   --   --   --  74  --   --   --  63   AST U/L  --   --  28  --   --   --   --  29  --   --   --  27   ALT U/L  --   --  31  --   --   --   --  32  --   --   --  26   Magnesium mg/dL  --   --  2.1  --   --   --  1.8  --   --   --  1.9  --    Phosphorus mg/dL  --   --  5.3*  --   --   --  3.1  --   --   --  4.7*  --        Vancomycin Administrations:  vancomycin given in the last 96 hours        No antibiotic orders with administrations found.                    Microbiologic Results:  Microbiology Results (last 7 days)       Procedure Component Value Units Date/Time    Respiratory Infection Panel (PCR), Nasopharyngeal [[id number]] Collected: 06/14/24 0922    Order Status: Completed Specimen: Nasopharyngeal Swab Updated: 06/14/24 1117     Respiratory Infection Panel Source NP Swab     Adenovirus Not Detected     Coronavirus 229E, Common Cold Virus Not Detected     Coronavirus HKU1, Common Cold Virus Not Detected     Coronavirus NL63, Common Cold Virus Not Detected     Coronavirus OC43, Common Cold Virus Not Detected     Comment: The Coronavirus strains detected in this test cause the common cold.  These strains are not the COVID-19 (novel Coronavirus)strain   associated with the respiratory disease outbreak.          SARS-CoV2 (COVID-19) Qualitative PCR Not Detected     Human Metapneumovirus Not Detected     Human Rhinovirus/Enterovirus Not Detected     Influenza A  (subtypes H1, H1-2009,H3) Not Detected     Influenza B Not Detected     Parainfluenza Virus 1 Not Detected     Parainfluenza Virus 2 Not Detected     Parainfluenza Virus 3 Not Detected     Parainfluenza Virus 4 Not Detected     Respiratory Syncytial Virus Not Detected     Bordetella Parapertussis (WZ2363) Not Detected     Bordetella pertussis (ptxP) Not Detected     Chlamydia pneumoniae Not Detected     Mycoplasma pneumoniae Not Detected    Narrative:      Assay not valid for lower respiratory specimens, alternate  testing required.    Respiratory Infection Panel (PCR), Nasopharyngeal [[id number]]     Order Status: No result Specimen: Nasopharyngeal Swab     Blood culture [[id number]] Collected: 06/10/24 0719    Order Status: Completed Specimen: Blood Updated: 06/14/24 1012     Blood Culture, Routine No Growth to date      No Growth to date      No Growth to date      No Growth to date      No Growth to date    Narrative:      Lft forearm    Blood culture [[id number]] Collected: 06/10/24 0719    Order Status: Completed Specimen: Blood Updated: 06/14/24 1012     Blood Culture, Routine No Growth to date      No Growth to date      No Growth to date      No Growth to date      No Growth to date    Narrative:      Lft hand    Blood culture [[id number]] Collected: 06/13/24 0622    Order Status: Completed Specimen: Blood Updated: 06/14/24 0812     Blood Culture, Routine No Growth to date      No Growth to date    Blood culture [[id number]] Collected: 06/13/24 0622    Order Status: Completed Specimen: Blood Updated: 06/14/24 0812     Blood Culture, Routine No Growth to date      No Growth to date

## 2024-06-14 NOTE — PROGRESS NOTES
[facility] - Telemetry Stepdown  Nephrology  Progress Note    Patient Name: Sarah Saravia  MRN: [id number]  Admission Date: 4/10/2024  Hospital Length of Stay: 65 days  Attending Provider: Virgil Fierro III*   Primary Care Physician: Deanna, Primary Doctor  Principal Problem:Acute cystitis with hematuria    Subjective:     Interval History: Afebrile overnight. HD today.     Review of patient's allergies indicates:  No Known Allergies  Current Facility-Administered Medications   Medication Frequency    0.9%  NaCl infusion Once    acetaminophen oral solution 650 mg Q6H PRN    ascorbic acid (vitamin C) tablet 500 mg BID    aspirin chewable tablet 81 mg Daily    atorvastatin tablet 40 mg Daily    dextrose 10% bolus 125 mL 125 mL PRN    dextrose 10% bolus 250 mL 250 mL PRN    epoetin merry injection 6,100 Units Every Mon, Wed, Fri    glucagon (human recombinant) injection 1 mg PRN    glucose chewable tablet 16 g PRN    glucose chewable tablet 24 g PRN    heparin (porcine) injection 3,000 Units PRN    heparin (porcine) injection 5,000 Units Q8H    hepatitis B virus vacc.rec(PF) injection 20 mcg vaccine x 1 dose    insulin aspart U-100 pen 0-10 Units QID (AC + HS) PRN    insulin glargine U-100 (Lantus) pen 13 Units QHS    meropenem (MERREM) 500 mg in sodium chloride 0.9 % 100 mL IVPB (MB+) Q12H    metoprolol tartrate (LOPRESSOR) tablet 25 mg BID    ondansetron 4 mg/5 mL solution 4 mg Q6H PRN    oxyCODONE 5 mg/5 mL solution 5 mg Q4H PRN    pantoprazole suspension 40 mg Daily    polyethylene glycol packet 17 g Daily    sevelamer carbonate pwpk 0.8 g TID WM    sodium chloride 0.9% bolus 250 mL 250 mL PRN    sodium chloride 0.9% flush 10 mL Q12H PRN    vancomycin - pharmacy to dose pharmacy to manage frequency    vancomycin 750 mg in dextrose 5 % (D5W) 250 mL IVPB (Vial-Mate) Once       Objective:     Vital Signs (Most Recent):  Temp: 98.1 °F (36.7 °C) (06/14/24 1132)  Pulse: 81 (06/14/24 1132)  Resp: 16 (06/14/24 1132)  BP:  116/75 (06/14/24 1132)  SpO2: 100 % (06/14/24 1132) Vital Signs (24h Range):  Temp:  [98.1 °F (36.7 °C)-98.9 °F (37.2 °C)] 98.1 °F (36.7 °C)  Pulse:  [81-95] 81  Resp:  [16-19] 16  SpO2:  [97 %-100 %] 100 %  BP: ()/(61-78) 116/75     Weight: 105.5 kg (232 lb 9.4 oz) (06/11/24 1330)  Body mass index is 36.43 kg/m².  Body surface area is 2.23 meters squared.    No intake/output data recorded.     Physical Exam  Vitals and nursing note reviewed.   Constitutional:       General: She is not in acute distress.     Appearance: She is not ill-appearing, toxic-appearing or diaphoretic.   HENT:      Head: Normocephalic and atraumatic.   Eyes:      General: No scleral icterus.        Right eye: No discharge.         Left eye: No discharge.      Conjunctiva/sclera: Conjunctivae normal.   Neck:      Comments: Decannulated  Cardiovascular:      Rate and Rhythm: Normal rate.   Pulmonary:      Effort: Pulmonary effort is normal. No respiratory distress.   Abdominal:      Palpations: Abdomen is soft.      Comments: PEG in place.      Musculoskeletal:      Right lower leg: No edema.      Left lower leg: No edema.   Skin:     General: Skin is warm and dry.   Neurological:      General: No focal deficit present.      Mental Status: She is alert.      Comments: Nonverbal          Significant Labs:  CBC:   Recent Labs   Lab 06/14/24  0605   WBC 10.40   RBC 3.14*   HGB 7.9*   HCT 25.8*      MCV 82   MCH 25.2*   MCHC 30.6*     CMP:   Recent Labs   Lab 06/14/24  0829   GLU 97   CALCIUM 9.0   ALBUMIN 2.6*   PROT 6.8   *   K 3.3*  3.3*   CO2 24   CL 90*   BUN 26*   CREATININE 5.4*   ALKPHOS 63   ALT 26   AST 27   BILITOT 0.3     All labs within the past 24 hours have been reviewed.         Assessment/Plan:     Renal/  * Acute cystitis with hematuria  - defer to primary     ESRD (end stage renal disease) on dialysis  53 y.o. Black or  Female ESRD-HD M-W-F at [facility] presents to ED on 4/10/2024 with UTI.    Of  note, the patient was initiated on RRT in February 2024 after being admitted with ALVARADO 2/2 iATN due to septic shock. Perm cath placed on 2/29/24. She was transferred to [facility] on 3/[facility]. Deemed ESRD at LTAC.  Nephrology consulted for inpatient ESRD-HD management    Assessment:   - HD today for metabolic clearance and volume management   - Continue to monitor intake and output  -Hyponatremia - consider decreasing free water flushes   - Please avoid gadolinium, fleets, phos-based laxatives, NSAIDs  - Dialysis thrice weekly unless more urgent indications arise. Will evaluate RRT requirements Daily.      Lab Results   Component Value Date    FESATURATED 10 (L) 03/15/2024    FERRITIN 414 (H) 03/15/2024       - Goal in ESRD is Hgb of 10-11. Continue EPO.       Secondary hyperparathyroid of renal origin   - phos 4.2 - continue sevelamer           Thank you for your consult. I will follow-up with patient. Please contact us if you have any additional questions.    Delfina Shi DNP  Nephrology  [facility] - Telemetry Stepdown

## 2024-06-14 NOTE — PROGRESS NOTES
Patient arrived in a bed  to dialysis unit. Contact isolation maintained  Report received from primary nurse  VS's per dialysis Flowsheet.     Hemodialysis initiated using the following:     Dialysis Access: right subclavian cath      Will Maintain telemetry and blood pressure monitoring throughout treatment.  Refer to dialysis flowsheet and MAR for details.

## 2024-06-14 NOTE — PT/OT/SLP PROGRESS
Speech Language Pathology Treatment    Patient Name:  Sarah Saravia   MRN:  [id number]  Admitting Diagnosis: Acute cystitis with hematuria    Recommendations:                 General Recommendations:  Dysphagia therapy and Speech/language therapy  Diet recommendations:  NPO, NPO  Aspiration Precautions:   Pleasure feedings of small open cup sips of water  Continue alternate means of nutrition  HOB to 90 degrees  Monitor for s/s of aspiration  Strict aspiration precautions   General Precautions: Standard, aphasia, aspiration, fall, contact, NPO  Communication strategies:  yes/no questions only and go to room if call light pushed    Assessment:     Sarah Saravia is a 53 y.o. female with an SLP diagnosis of Aphasia, Dysphagia, and Cognitive-Linguistic Impairment. SLP to continue to follow.      Subjective     Pt found resting in bed upon SLP entry into room.     Pain/Comfort:  Pain Rating 1: 0/10    Respiratory Status: Room air    Objective:     Has the patient been evaluated by SLP for swallowing?   Yes  Keep patient NPO? Yes   Current Respiratory Status:        Pt seen for ongoing dysphagia therapy. Pt awake throughout session though no true vocalizations produced this date. HOB elevated for all PO intake. Pt participate in direct swallows of ice chips x4 and open cup sips of water x3 with good oral acceptance though oral holding for ~10 seconds exhibited across all thin liquid trials. 1/2 tsp bites of puree x3 completed with pt displaying significant oral holding for ~45 seconds per bolus; 3 second bolus prep paired with tactile cueing inconsistently improving AP bolus transport. SLP provided education regarding overall impressions, pleasure feedings of ice chips/small open cup sips of water, and ongoing SLP POC. Pt verbalized understanding but would continue to benefit from ongoing education.       Goals:   Multidisciplinary Problems       SLP Goals          Problem: SLP    Goal Priority Disciplines Outcome    SLP Goal     SLP Progressing   Description: Speech Language Pathology Goals  Updated goals expected to be met by 5/10 (goals remain appropriate 6/11 - reassess on 6/18:  1. Pt will participate in ongoing swallowing assessment to determine if appropriate for PO trials for pleasure.   2. Pt will model single step command x 1 given max cues.   3. Pt will answer simple yes/no q's during a structured receptive language task x 1 given max cues.   4. Pt will phonate purposefully upon command x 1 given max cues.   5. Pt will attempt to vocalize/verbalize during automatic speech task x 1 given max cues.   6. Pt will participate in evaluation of ability to utilize basic communication board.     Goals expected to be met by 5/8:  1. Pt will participate in Modified Barium Swallow Study to determine if safe for oral intake. Goal met/attempted 5/2                               Plan:     Patient to be seen:  3 x/week   Plan of Care expires:  05/31/24  Plan of Care reviewed with:  patient   SLP Follow-Up:  Yes       Discharge recommendations:  Moderate Intensity Therapy     Time Tracking:     SLP Treatment Date:   06/14/24  Speech Start Time:  0940  Speech Stop Time:  0954     Speech Total Time (min):  14 min    Billable Minutes: Treatment Swallowing Dysfunction 14 06/14/2024

## 2024-06-14 NOTE — PROGRESS NOTES
06/14/24 1715   Post-Hemodialysis Assessment   Rinseback Volume (mL) 250 mL   Blood Volume Processed (Liters) 59.5 L   Dialyzer Clearance Moderately streaked   Duration of Treatment 210 minutes   Additional Fluid Intake (mL) 300 mL   Total UF (mL) 2550 mL   Net Fluid Removal 2000   Patient Response to Treatment tolerated   Post-Treatment Weight   (arrived via bed)   Post-Hemodialysis Comments see notes     HD TX complete. Net removal 2000 ml. Pt awake, alert, VSS, NAD. Report given to primary nurse. Awaiting transport to escort pt back to room via bed.

## 2024-06-14 NOTE — PLAN OF CARE
[facility] - Telemetry Stepdown  Discharge Reassessment    Primary Care Provider: No, Primary Doctor    Expected Discharge Date: 6/17/2024    Reassessment (most recent)       Discharge Reassessment - 06/14/24 1312          Discharge Reassessment    Assessment Type Discharge Planning Reassessment     Did the patient's condition or plan change since previous assessment? Yes     Discharge Plan discussed with: Adult children     Communicated ZEKE with patient/caregiver Yes     Discharge Plan A New Nursing Home placement - skilled nursing care facility   [facility]    Discharge Plan B New Nursing Home placement - skilled nursing care facility     DME Needed Upon Discharge  other (see comments)   TBD    Transition of Care Barriers None     Why the patient remains in the hospital Requires continued medical care        Post-Acute Status    Post-Acute Authorization Placement     Post-Acute Placement Status Set-up Complete/Auth obtained     Diaylsis Status Set-up Complete/Auth obtained   [facility] -M/W/F                    Discharge Plan A and Plan B have been determined by review of patient's clinical status, future medical and therapeutic needs, and coverage/benefits for post-acute care in coordination with multidisciplinary team members.     Sara Loredo RN  Ext [phone]

## 2024-06-14 NOTE — ASSESSMENT & PLAN NOTE
Creatine stable for now. BMP reviewed- noted Estimated Creatinine Clearance: 15.1 mL/min (A) (based on SCr of 5.4 mg/dL (H)). according to latest data. Based on current GFR, CKD stage is end stage.  Monitor UOP and serial BMP and adjust therapy as needed. Renally dose meds. Avoid nephrotoxic medications and procedures.    SW onboard for placement to [facility] to continue dialysis. Once patient gets accepted at [facility] [place], next step will be to work with daughter on [facility] placement.     -- HD MWF (~1L fluid removal on average per session)  -- nephro following

## 2024-06-14 NOTE — PT/OT/SLP PROGRESS
"Occupational Therapy  Co -  Treatment with PT    Co-evaluation/treatment performed due to patient's multiple deficits requiring two skilled therapists to appropriately and safely assess patient's strength and endurance while facilitating functional tasks in addition to accommodating for patient's activity tolerance.       Name: Sarah Saravia  MRN: [id number]  Admitting Diagnosis:  Acute cystitis with hematuria       Recommendations:     Discharge Recommendations: Moderate Intensity Therapy  Discharge Equipment Recommendations:  hospital bed, lift device, wheelchair  Barriers to discharge:  increased assistance    Assessment:     Sarah Saravia is a 53 y.o. female with a medical diagnosis of Acute cystitis with hematuria.  She presents with performance deficits affecting function are weakness, impaired endurance, impaired self care skills, impaired functional mobility, impaired cognition, decreased upper extremity function, decreased lower extremity function, impaired cardiopulmonary response to activity, decreased safety awareness, edema.     Pt engaged well in therapy this date with multiple multisensory prompts, requiring total A of 2 persons for bed mobility.  Pt able to sit EOB with SBA ~20min with good upright balance.  Pt engaged in attempts at grooming ADLs at EOB, able to participate occasionally/selectively with therapists initiating tasks.  Pt completed 2 STS transfers with total A of 2 persons with BLE blocking, able to clear ~2" from bed.  Pt able to scoot to HOB while sitting EOB ~4" total across ~8 scoot attempts with total A of 2 persons. Pt returned to HOB raised position in bed, RN notified.  Pt on pathway for post acute level of therapy at moderate level of intensity.       Rehab Prognosis:  Good; patient would benefit from acute skilled OT services to address these deficits and reach maximum level of function.       Plan:     Patient to be seen 3 x/week to address the above listed problems via " "self-care/home management, therapeutic activities, therapeutic exercises, neuromuscular re-education  Plan of Care Expires: 06/19/24  Plan of Care Reviewed with: patient    Subjective     Chief Complaint: No complaints  Patient/Family Comments/goals: Get better  Pain/Comfort:  Pain Rating 1: 0/10  Location - Side 1: Bilateral  Location - Orientation 1: generalized  Location 1: knee  Pain Addressed 1: Reposition, Distraction, Cessation of Activity  Pain Rating Post-Intervention 1:  not rated, rubbed knees after stand attempt    Objective:     Communicated with: RN prior to session.  Patient found HOB raised with PEG Tube, peripheral IV upon OT entry to room.    General Precautions: Standard, aphasia, aspiration, fall, contact, NPO    Orthopedic Precautions:N/A  Braces: N/A  Respiratory Status: Room air     Occupational Performance:     Bed Mobility:    Patient completed Rolling/Turning to Left with  total assistance  Patient completed Rolling/Turning to Right with total assistance  Patient completed Scooting/Bridging:   To EOB while sitting with total assistance  To HOB while sitting EOB with total A of 2 persons, ~4" across ~8 scoots with therapists managing placement of BLE  To HOB while supine with total A of 2 persons  Patient completed Supine to Sit with total assistance and 2 persons  Patient completed Sit to Supine with total assistance and 2 persons   Pt able to sit EOB with good upright posture, SBA    Functional Mobility/Transfers:  Patient completed Sit <> Stand Transfer with total assistance and of 2 persons  with  hand-held assist  and BLE blocked    Activities of Daily Living:  Feeding:  total assistance managing PEG before and after therapy   Grooming:   OT initiated facial hygiene with warm washcloth, pt attempted and able to clean mouth and then selected parts of face, OT and PT assisted with missed areas.    OT initiated pt taking off glasses, pt able to do so partially once and not completing " again.  Upper Body Dressing: maximal assistance donning 2nd gown as robe, assisted with threading BUE through arms  Lower Body Dressing: total assistance donning bilateral socks  Toileting: total assistance managing meza catheter      [facility] 6 Click ADL: 9    Treatment & Education:  Pt educated on role of OT, POC, and goals for therapy.    POC was dicussed with patient/caregiver, who was included in its development and is in agreement with the identified goals and treatment plan.   Patient and family aware of patient's deficits and therapy progression.   Time provided for therapeutic counseling and discussion of health disposition.   Educated on importance of EOB/OOB mobility, maintaining routine, sitting up in chair, and maximizing independence with ADLs during admission   Pt completed ADLs and functional mobility for treatment session as noted above   Pt/caregiver verbalized understanding and expressed no further concerns/questions.  Updated communication board with level of assist required      Patient left HOB elevated with all lines intact, call button in reach, and RN notified    GOALS:   Multidisciplinary Problems       Occupational Therapy Goals          Problem: Occupational Therapy    Goal Priority Disciplines Outcome Interventions   Occupational Therapy Goal     OT, PT/OT Progressing    Description: Goals to be met by: 5/22/24 Goals revised as needed on 05-30 to be met by 06-19:    Patient will increase functional independence with ADLs by performing:    UE Dressing with Maximum Assistance.MET 05-30  Revised: UBD with Min A  Grooming while EOB with Maximum Assistance.Met 05-30  Revised: Grooming seated EOB with CGA  Sitting at edge of bed x5 minutes with Maximum Assistance. - Met 5/22  Revised: Sit EOB x 20 minutes with SBA  MET 06-10-24  Rolling to Bilateral with Moderate Assistance.NOT MET     Supine to sit with Maximum Assistance. NOT MET                           Time Tracking:     OT Date of  Treatment: 06/14/24  OT Start Time: 1129  OT Stop Time: 1209  OT Total Time (min): 40 min    Billable Minutes:Self Care/Home Management 17  Therapeutic Activity 23    OT/JOSÉ: OT     Number of JOSÉ visits since last OT visit: 0    6/14/2024

## 2024-06-14 NOTE — ASSESSMENT & PLAN NOTE
53 y.o. Black or  Female ESRD-HD M-W-F at [facility] presents to ED on 4/10/2024 with UTI.    Of note, the patient was initiated on RRT in February 2024 after being admitted with ALVARADO 2/2 iATN due to septic shock. Perm cath placed on 2/29/24. She was transferred to [facility] on 3/12. Deemed ESRD at [facility].  Nephrology consulted for inpatient ESRD-HD management    Assessment:   - Will plan for HD tomorrow for clearance and volume management.   - Continue to monitor intake and output  - Please avoid gadolinium, fleets, phos-based laxatives, NSAIDs  - Dialysis thrice weekly unless more urgent indications arise. Will evaluate RRT requirements Daily.      Lab Results   Component Value Date    FESATURATED 10 (L) 03/15/2024    FERRITIN 414 (H) 03/15/2024       - Goal in ESRD is Hgb of 10-11. Continue EPO.       Secondary hyperparathyroid of renal origin   - phos 4.2 - no indication for phos binders

## 2024-06-14 NOTE — SUBJECTIVE & OBJECTIVE
Interval History: Afebrile overnight. HD today.     Review of patient's allergies indicates:  No Known Allergies  Current Facility-Administered Medications   Medication Frequency    0.9%  NaCl infusion Once    acetaminophen oral solution 650 mg Q6H PRN    ascorbic acid (vitamin C) tablet 500 mg BID    aspirin chewable tablet 81 mg Daily    atorvastatin tablet 40 mg Daily    dextrose 10% bolus 125 mL 125 mL PRN    dextrose 10% bolus 250 mL 250 mL PRN    epoetin merry injection 6,100 Units Every Mon, Wed, Fri    glucagon (human recombinant) injection 1 mg PRN    glucose chewable tablet 16 g PRN    glucose chewable tablet 24 g PRN    heparin (porcine) injection 3,000 Units PRN    heparin (porcine) injection 5,000 Units Q8H    hepatitis B virus vacc.rec(PF) injection 20 mcg vaccine x 1 dose    insulin aspart U-100 pen 0-10 Units QID (AC + HS) PRN    insulin glargine U-100 (Lantus) pen 13 Units QHS    meropenem (MERREM) 500 mg in sodium chloride 0.9 % 100 mL IVPB (MB+) Q12H    metoprolol tartrate (LOPRESSOR) tablet 25 mg BID    ondansetron 4 mg/5 mL solution 4 mg Q6H PRN    oxyCODONE 5 mg/5 mL solution 5 mg Q4H PRN    pantoprazole suspension 40 mg Daily    polyethylene glycol packet 17 g Daily    sevelamer carbonate pwpk 0.8 g TID WM    sodium chloride 0.9% bolus 250 mL 250 mL PRN    sodium chloride 0.9% flush 10 mL Q12H PRN    vancomycin - pharmacy to dose pharmacy to manage frequency    vancomycin 750 mg in dextrose 5 % (D5W) 250 mL IVPB (Vial-Mate) Once       Objective:     Vital Signs (Most Recent):  Temp: 98.1 °F (36.7 °C) (06/14/24 1132)  Pulse: 81 (06/14/24 1132)  Resp: 16 (06/14/24 1132)  BP: 116/75 (06/14/24 1132)  SpO2: 100 % (06/14/24 1132) Vital Signs (24h Range):  Temp:  [98.1 °F (36.7 °C)-98.9 °F (37.2 °C)] 98.1 °F (36.7 °C)  Pulse:  [81-95] 81  Resp:  [16-19] 16  SpO2:  [97 %-100 %] 100 %  BP: ()/(61-78) 116/75     Weight: 105.5 kg (232 lb 9.4 oz) (06/11/24 1330)  Body mass index is 36.43 kg/m².  Body  surface area is 2.23 meters squared.    No intake/output data recorded.     Physical Exam  Vitals and nursing note reviewed.   Constitutional:       General: She is not in acute distress.     Appearance: She is not ill-appearing, toxic-appearing or diaphoretic.   HENT:      Head: Normocephalic and atraumatic.   Eyes:      General: No scleral icterus.        Right eye: No discharge.         Left eye: No discharge.      Conjunctiva/sclera: Conjunctivae normal.   Neck:      Comments: Decannulated  Cardiovascular:      Rate and Rhythm: Normal rate.   Pulmonary:      Effort: Pulmonary effort is normal. No respiratory distress.   Abdominal:      Palpations: Abdomen is soft.      Comments: PEG in place.      Musculoskeletal:      Right lower leg: No edema.      Left lower leg: No edema.   Skin:     General: Skin is warm and dry.   Neurological:      General: No focal deficit present.      Mental Status: She is alert.      Comments: Nonverbal          Significant Labs:  CBC:   Recent Labs   Lab 06/14/24  0605   WBC 10.40   RBC 3.14*   HGB 7.9*   HCT 25.8*      MCV 82   MCH 25.2*   MCHC 30.6*     CMP:   Recent Labs   Lab 06/14/24  0829   GLU 97   CALCIUM 9.0   ALBUMIN 2.6*   PROT 6.8   *   K 3.3*  3.3*   CO2 24   CL 90*   BUN 26*   CREATININE 5.4*   ALKPHOS 63   ALT 26   AST 27   BILITOT 0.3     All labs within the past 24 hours have been reviewed.

## 2024-06-14 NOTE — ASSESSMENT & PLAN NOTE
"This patient does have evidence of infective focus  My overall impression is sepsis.  Source: Skin and Soft Tissue (location Abdominal)  Antibiotics given-   Antibiotics (72h ago, onward)      Start     Stop Route Frequency Ordered    06/14/24 1600  vancomycin 750 mg in dextrose 5 % (D5W) 250 mL IVPB (Vial-Mate)         -- IV Once 06/14/24 0733    06/14/24 0740  vancomycin - pharmacy to dose  (vancomycin IVPB (PEDS and ADULTS))        Placed in "And" Linked Group    -- IV pharmacy to manage frequency 06/14/24 0640    06/13/24 1115  meropenem (MERREM) 500 mg in sodium chloride 0.9 % 100 mL IVPB (MB+)         -- IV Every 12 hours (non-standard times) 06/13/24 1007          Latest lactate reviewed-  Recent Labs   Lab 06/13/24  0850   LACTATE 1.2         Fluid challenge Contraindicated- Fluid bolus is contraindicated in this patient due to End Stage Renal Disease     Post- resuscitation assessment No - Post resuscitation assessment not needed       Will Not start Pressors- Levophed for MAP of 65  Source control achieved by: vanc/meropenem     Plan:  - Concerns septic source may be PEG site with associated purulent wound despite Wound Care attempts to protect with barriers. PEG placed by General Surgery 2/2024. General Surgery consulted regarding PEG site ordered CT abdomen. Currently in place.   - continue vanc/meropenem as ID recs  - Pending US extremities given patient with previous episodes of fever   "

## 2024-06-14 NOTE — ASSESSMENT & PLAN NOTE
Adjusting insulin to account for diabetic TF    Patient's FSGs are controlled on current medication regimen.  Last A1c reviewed-   Lab Results   Component Value Date    HGBA1C 6.2 (H) 05/27/2024     Most recent fingerstick glucose reviewed-   Recent Labs   Lab 06/13/24  1649 06/13/24  2057 06/14/24  0906 06/14/24  1133   POCTGLUCOSE 132* 117* 116* 126*       Current correctional scale  Medium  Maintain anti-hyperglycemic dose as follows-   Antihyperglycemics (From admission, onward)    Start     Stop Route Frequency Ordered    06/09/24 2100  insulin glargine U-100 (Lantus) pen 13 Units         -- SubQ Nightly 06/09/24 0756    06/09/24 1255  insulin aspart U-100 pen 0-10 Units         -- SubQ Before meals & nightly PRN 06/09/24 1155        Hold Oral hypoglycemics while patient is in the hospital.  POCT glucose checks   -tube feeds

## 2024-06-15 LAB
BACTERIA BLD CULT: NORMAL
BACTERIA BLD CULT: NORMAL
BASOPHILS # BLD AUTO: 0.05 K/UL (ref 0–0.2)
BASOPHILS NFR BLD: 0.5 % (ref 0–1.9)
DIFFERENTIAL METHOD BLD: ABNORMAL
EOSINOPHIL # BLD AUTO: 0.2 K/UL (ref 0–0.5)
EOSINOPHIL NFR BLD: 2.4 % (ref 0–8)
ERYTHROCYTE [DISTWIDTH] IN BLOOD BY AUTOMATED COUNT: 15.8 % (ref 11.5–14.5)
HCT VFR BLD AUTO: 27.1 % (ref 37–48.5)
HGB BLD-MCNC: 8 G/DL (ref 12–16)
IMM GRANULOCYTES # BLD AUTO: 0.04 K/UL (ref 0–0.04)
IMM GRANULOCYTES NFR BLD AUTO: 0.4 % (ref 0–0.5)
LYMPHOCYTES # BLD AUTO: 1.5 K/UL (ref 1–4.8)
LYMPHOCYTES NFR BLD: 15.5 % (ref 18–48)
MAGNESIUM SERPL-MCNC: 1.9 MG/DL (ref 1.6–2.6)
MCH RBC QN AUTO: 25 PG (ref 27–31)
MCHC RBC AUTO-ENTMCNC: 29.5 G/DL (ref 32–36)
MCV RBC AUTO: 85 FL (ref 82–98)
MONOCYTES # BLD AUTO: 1.3 K/UL (ref 0.3–1)
MONOCYTES NFR BLD: 13.2 % (ref 4–15)
NEUTROPHILS # BLD AUTO: 6.7 K/UL (ref 1.8–7.7)
NEUTROPHILS NFR BLD: 68 % (ref 38–73)
NRBC BLD-RTO: 0 /100 WBC
PHOSPHATE SERPL-MCNC: 2.6 MG/DL (ref 2.7–4.5)
PLATELET # BLD AUTO: 273 K/UL (ref 150–450)
PMV BLD AUTO: 9.6 FL (ref 9.2–12.9)
POCT GLUCOSE: 103 MG/DL (ref 70–110)
POCT GLUCOSE: 106 MG/DL (ref 70–110)
POCT GLUCOSE: 129 MG/DL (ref 70–110)
POCT GLUCOSE: 136 MG/DL (ref 70–110)
POCT GLUCOSE: 96 MG/DL (ref 70–110)
POTASSIUM SERPL-SCNC: 3.9 MMOL/L (ref 3.5–5.1)
POTASSIUM SERPL-SCNC: 4.7 MMOL/L (ref 3.5–5.1)
RBC # BLD AUTO: 3.2 M/UL (ref 4–5.4)
WBC # BLD AUTO: 9.85 K/UL (ref 3.9–12.7)

## 2024-06-15 PROCEDURE — 25000003 PHARM REV CODE 250

## 2024-06-15 PROCEDURE — 83735 ASSAY OF MAGNESIUM: CPT | Performed by: STUDENT IN AN ORGANIZED HEALTH CARE EDUCATION/TRAINING PROGRAM

## 2024-06-15 PROCEDURE — 84100 ASSAY OF PHOSPHORUS: CPT | Performed by: STUDENT IN AN ORGANIZED HEALTH CARE EDUCATION/TRAINING PROGRAM

## 2024-06-15 PROCEDURE — 85025 COMPLETE CBC W/AUTO DIFF WBC: CPT | Performed by: STUDENT IN AN ORGANIZED HEALTH CARE EDUCATION/TRAINING PROGRAM

## 2024-06-15 PROCEDURE — 20600001 HC STEP DOWN PRIVATE ROOM

## 2024-06-15 PROCEDURE — A4216 STERILE WATER/SALINE, 10 ML: HCPCS

## 2024-06-15 PROCEDURE — 25000003 PHARM REV CODE 250: Performed by: NURSE PRACTITIONER

## 2024-06-15 PROCEDURE — 36415 COLL VENOUS BLD VENIPUNCTURE: CPT | Performed by: STUDENT IN AN ORGANIZED HEALTH CARE EDUCATION/TRAINING PROGRAM

## 2024-06-15 PROCEDURE — 63600175 PHARM REV CODE 636 W HCPCS

## 2024-06-15 PROCEDURE — 27000207 HC ISOLATION

## 2024-06-15 PROCEDURE — 36415 COLL VENOUS BLD VENIPUNCTURE: CPT | Mod: XB

## 2024-06-15 PROCEDURE — 84132 ASSAY OF SERUM POTASSIUM: CPT | Mod: 91

## 2024-06-15 PROCEDURE — 25000003 PHARM REV CODE 250: Performed by: STUDENT IN AN ORGANIZED HEALTH CARE EDUCATION/TRAINING PROGRAM

## 2024-06-15 RX ADMIN — HEPARIN SODIUM 5000 UNITS: 5000 INJECTION INTRAVENOUS; SUBCUTANEOUS at 02:06

## 2024-06-15 RX ADMIN — Medication 500 MG: at 09:06

## 2024-06-15 RX ADMIN — ATORVASTATIN CALCIUM 40 MG: 40 TABLET, FILM COATED ORAL at 08:06

## 2024-06-15 RX ADMIN — INSULIN GLARGINE 13 UNITS: 100 INJECTION, SOLUTION SUBCUTANEOUS at 09:06

## 2024-06-15 RX ADMIN — HEPARIN SODIUM 5000 UNITS: 5000 INJECTION INTRAVENOUS; SUBCUTANEOUS at 09:06

## 2024-06-15 RX ADMIN — SEVELAMER CARBONATE 0.8 G: 800 POWDER, FOR SUSPENSION ORAL at 11:06

## 2024-06-15 RX ADMIN — HEPARIN SODIUM 5000 UNITS: 5000 INJECTION INTRAVENOUS; SUBCUTANEOUS at 05:06

## 2024-06-15 RX ADMIN — SODIUM PHOSPHATE, MONOBASIC, MONOHYDRATE AND SODIUM PHOSPHATE, DIBASIC, ANHYDROUS 20.01 MMOL: 142; 276 INJECTION, SOLUTION INTRAVENOUS at 08:06

## 2024-06-15 RX ADMIN — MEROPENEM 500 MG: 500 INJECTION INTRAVENOUS at 11:06

## 2024-06-15 RX ADMIN — Medication 500 MG: at 08:06

## 2024-06-15 RX ADMIN — POLYETHYLENE GLYCOL 3350 17 G: 17 POWDER, FOR SOLUTION ORAL at 08:06

## 2024-06-15 RX ADMIN — SEVELAMER CARBONATE 0.8 G: 800 POWDER, FOR SUSPENSION ORAL at 04:06

## 2024-06-15 RX ADMIN — PANTOPRAZOLE SODIUM 40 MG: 40 GRANULE, DELAYED RELEASE ORAL at 08:06

## 2024-06-15 RX ADMIN — Medication 10 ML: at 11:06

## 2024-06-15 RX ADMIN — SEVELAMER CARBONATE 0.8 G: 800 POWDER, FOR SUSPENSION ORAL at 08:06

## 2024-06-15 RX ADMIN — METOPROLOL TARTRATE 25 MG: 25 TABLET, FILM COATED ORAL at 09:06

## 2024-06-15 RX ADMIN — METOPROLOL TARTRATE 25 MG: 25 TABLET, FILM COATED ORAL at 08:06

## 2024-06-15 RX ADMIN — MEROPENEM 500 MG: 500 INJECTION INTRAVENOUS at 10:06

## 2024-06-15 RX ADMIN — ASPIRIN 81 MG CHEWABLE TABLET 81 MG: 81 TABLET CHEWABLE at 08:06

## 2024-06-15 NOTE — ASSESSMENT & PLAN NOTE
Creatine stable for now. BMP reviewed- noted Estimated Creatinine Clearance: 15.1 mL/min (A) (based on SCr of 5.4 mg/dL (H)). according to latest data. Based on current GFR, CKD stage is end stage.  Monitor UOP and serial BMP and adjust therapy as needed. Renally dose meds. Avoid nephrotoxic medications and procedures.    SW onboard for placement to [facility] to continue dialysis. Once patient gets accepted at [facility], next step will be to work with daughter on residential NH placement.     -- HD MWF (~1L fluid removal on average per session)  -- nephro following  - hypophosphatemic on sevelamer. Reached out to nephrology for decrease in phosph binder to improve phosph levels

## 2024-06-15 NOTE — ASSESSMENT & PLAN NOTE
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 Pt underwent extensive course, currently still healing from this, but no longer has wound vac. Pt's wound currently covered with bandage. Picture in media tabs    Plan  Wound care

## 2024-06-15 NOTE — PLAN OF CARE
PT/PTA met face to face to discuss patient's treatment plan and progress towards established goals.  Treatment will be continued as described in initial report/eval and progress notes.  Patient will be seen by physical therapist every sixth visit and minimally once per month.

## 2024-06-15 NOTE — ASSESSMENT & PLAN NOTE
"This patient does have evidence of infective focus  My overall impression is sepsis.  Source: Skin and Soft Tissue (location Abdominal)  Antibiotics given-   Antibiotics (72h ago, onward)      Start     Stop Route Frequency Ordered    06/14/24 0740  vancomycin - pharmacy to dose  (vancomycin IVPB (PEDS and ADULTS))        Placed in "And" Linked Group    -- IV pharmacy to manage frequency 06/14/24 0640    06/13/24 1115  meropenem (MERREM) 500 mg in sodium chloride 0.9 % 100 mL IVPB (MB+)         06/17/24 2359 IV Every 12 hours (non-standard times) 06/13/24 1007          Latest lactate reviewed-  Recent Labs   Lab 06/13/24  0850   LACTATE 1.2         Fluid challenge Contraindicated- Fluid bolus is contraindicated in this patient due to End Stage Renal Disease     Post- resuscitation assessment No - Post resuscitation assessment not needed       Will Not start Pressors- Levophed for MAP of 65  Source control achieved by: vanc/meropenem     Plan:  - Concerns septic source may be PEG site with associated purulent wound despite Wound Care attempts to protect with barriers. PEG placed by General Surgery 2/2024. General Surgery consulted regarding PEG site ordered CT abdomen. Currently in place.   - continue meropenem as ID recs for 5 day course; aspiration as possible cause of infection  - End date AB: 6/17/24 - may consider extending if patient continues hospitalized    - already completed 5 days vancomycin   - US extremities negative for thrombus   "

## 2024-06-15 NOTE — ASSESSMENT & PLAN NOTE
"Patient has hyponatremia which is uncontrolled,We will aim to correct the sodium by 4-6mEq in 24 hours. We will monitor sodium Daily. The hyponatremia is due to ESRD. Discussed with nephrology, dialysate bath adjusted to account for and correct hyponatremia.  No results for input(s): "NA" in the last 24 hours.    - Continue to montior  "

## 2024-06-15 NOTE — ASSESSMENT & PLAN NOTE
Adjusting insulin to account for diabetic TF    Patient's FSGs are controlled on current medication regimen.  Last A1c reviewed-   Lab Results   Component Value Date    HGBA1C 6.2 (H) 05/27/2024     Most recent fingerstick glucose reviewed-   Recent Labs   Lab 06/14/24  2106 06/15/24  0722 06/15/24  1136 06/15/24  1226   POCTGLUCOSE 97 103 136* 129*       Current correctional scale  Medium  Maintain anti-hyperglycemic dose as follows-   Antihyperglycemics (From admission, onward)    Start     Stop Route Frequency Ordered    06/09/24 2100  insulin glargine U-100 (Lantus) pen 13 Units         -- SubQ Nightly 06/09/24 0756    06/09/24 1255  insulin aspart U-100 pen 0-10 Units         -- SubQ Before meals & nightly PRN 06/09/24 1155        Hold Oral hypoglycemics while patient is in the hospital.  POCT glucose checks   -tube feeds

## 2024-06-15 NOTE — NURSING
Nurses Note -- 4 Eyes      6/14/2024   7:10 PM      Skin assessed during: Q Shift Change      [] No Altered Skin Integrity Present    []Prevention Measures Documented      [x] Yes- Altered Skin Integrity Present or Discovered   [] LDA Added if Not in Epic (Describe Wound)   [] New Altered Skin Integrity was Present on Admit and Documented in LDA   [] Wound Image Taken    Wound Care Consulted? yes    Attending Nurse:  ELISABETH Puente    Second RN/Staff Member:  ELISABETH Wolfe

## 2024-06-15 NOTE — PROGRESS NOTES
VANCOMYCIN DOSING BY PHARMACY DISCONTINUATION NOTE    Sarah Saravia is a 53 y.o. female who had been consulted for vancomycin dosing.    The pharmacy consult for vancomycin dosing has been discontinued.     Vancomycin Dosing by Pharmacy Consult will sign-off. Please reconsult if necessary. Thank you for allowing us to participate in this patient's care.       Magy Mcclure PharmD  [phone]

## 2024-06-15 NOTE — PLAN OF CARE
Problem: Infection  Goal: Absence of Infection Signs and Symptoms  Outcome: Progressing     Problem: Skin Injury Risk Increased  Goal: Skin Health and Integrity  Outcome: Progressing     Problem: Adult Inpatient Plan of Care  Goal: Plan of Care Review  Outcome: Progressing     Patient alert and awake. No acute S/S noted during shift. Family at bedside. Patient care ongoing.

## 2024-06-15 NOTE — SUBJECTIVE & OBJECTIVE
Interval History: continue meropenem as per ID recs (end date 6/17/24). Will consider extending AB course if needed. Pending NH placement.    Review of Systems   Unable to perform ROS: Patient nonverbal     Objective:     Vital Signs (Most Recent):  Temp: 97.7 °F (36.5 °C) (06/15/24 1223)  Pulse: 89 (06/15/24 1054)  Resp: (!) 24 (06/15/24 1054)  BP: (!) 113/57 (06/15/24 1054)  SpO2: 98 % (06/15/24 0726) Vital Signs (24h Range):  Temp:  [97.7 °F (36.5 °C)-98.5 °F (36.9 °C)] 97.7 °F (36.5 °C)  Pulse:  [] 89  Resp:  [16-24] 24  SpO2:  [97 %-100 %] 98 %  BP: (107-135)/(57-84) 113/57     Weight: 105.5 kg (232 lb 9.4 oz)  Body mass index is 36.43 kg/m².    Intake/Output Summary (Last 24 hours) at 6/15/2024 1334  Last data filed at 6/15/2024 1154  Gross per 24 hour   Intake 1106.2 ml   Output 2550 ml   Net -1443.8 ml         Physical Exam  Vitals and nursing note reviewed.   Constitutional:       General: She is not in acute distress.     Appearance: She is not ill-appearing, toxic-appearing or diaphoretic.   HENT:      Head: Normocephalic and atraumatic.   Eyes:      General: No scleral icterus.        Right eye: No discharge.         Left eye: No discharge.      Conjunctiva/sclera: Conjunctivae normal.   Neck:      Comments: Decannulated  Cardiovascular:      Rate and Rhythm: Normal rate and regular rhythm.      Heart sounds: Normal heart sounds.   Pulmonary:      Effort: Pulmonary effort is normal. No respiratory distress.   Abdominal:      General: Abdomen is flat. There is no distension.      Tenderness: There is no abdominal tenderness.      Comments: PEG in place.      Musculoskeletal:      Right lower leg: No edema.      Left lower leg: No edema.   Skin:     General: Skin is warm and dry.   Neurological:      General: No focal deficit present.      Mental Status: She is alert.      Comments: Nonverbal             Significant Labs: All pertinent labs within the past 24 hours have been reviewed.    Significant  Imaging: I have reviewed all pertinent imaging results/findings within the past 24 hours.

## 2024-06-15 NOTE — NURSING
Nurses Note -- 4 Eyes      6/15/2024   8:55 AM      Skin assessed during: Q Shift Change      [] No Altered Skin Integrity Present    []Prevention Measures Documented      [x] Yes- Altered Skin Integrity Present or Discovered   [] LDA Added if Not in Epic (Describe Wound)   [] New Altered Skin Integrity was Present on Admit and Documented in LDA   [] Wound Image Taken    Wound Care Consulted? Yes    Attending Nurse:  ELISABETH Andrews    Second RN/Staff Member:  ELISABETH Puente

## 2024-06-15 NOTE — ASSESSMENT & PLAN NOTE
Nutrition consulted. Most recent weight and BMI monitored-     Measurements:  Wt Readings from Last 1 Encounters:   06/11/24 105.5 kg (232 lb 9.4 oz)   Body mass index is 36.43 kg/m².    Patient has been screened and assessed by RD.    Malnutrition Type:  Context: acute illness or injury  Level: moderate    Malnutrition Characteristic Summary:  Weight Loss (Malnutrition): 10% in 6 months  Subcutaneous Fat (Malnutrition): mild depletion  Muscle Mass (Malnutrition): mild depletion  Fluid Accumulation (Malnutrition): mild    Interventions/Recommendations (treatment strategy):  - TF  - previous history of failed swallow studies

## 2024-06-15 NOTE — PROGRESS NOTES
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 History was given from patient's daughter. She was undergoing dialysis today and noticed she was lethargic, less alert than usual self. Pt completed dialysis and still not acting herself. EMS was called, fever of a 100.  On chart review, she did have an episode of large volume emesis around 1700.  Per EMS, she had a slightly elevated temp 100.0°F, glucose 300s.     In the ED: UA 2+ leuks, >100 WBC, many bacteria, WBC 17, CT abd/pelvis concerning for cystitis. Given vanc/zosyn    Overview/Hospital Course:  Patient was admitted to Hospital Medicine service for medical management and evaluation of urosepsis. Patient was continued on vanc/zosyn. Vascular Neurology consulted concerning mental status change with the recommendation to initiate ASA 81 QD and to obtain an MRI to r/o new stroke. Imaging pending. Nephrology was consulted for regularly scheduled dialysis. Afternoon of 4/12, patient was unable to tolerate filtration of volume during dialsis 2/2 hypotension. Patient received 500cc bolus and was transferred back to floor after finishing the session without volume removed. Will monitor for signs of volume overload. Tailoring insulin regimen while uptitrating tube feeds to goal rate. Staph Epi in all 4 bottles; ID with recommendation to continue Vanc & de-escalate meropenem to ertapenem on 04/15 through 4/16. Patient completed IV course of UTI coverage. Patient tolerated volume removal well in  dialysis throughout the rest of her hospital stay. Pending discharge to LTACH pending bed availability. Patient without BM with one episode of vomiting overnight 4/17. KUB with bowel gas and low concern for obstruction with no transition point noted. Escalating bowel regimen. Pending placement as of 4/22. Pulm consult placed to evaluate for possible trach downsize & eventual decannulation to assist placement if safe. Trach capping trial per pulm for 48h and will assess for decannulation on Monday. DVT studies negative. Pulm successfully decannulated pt 4/30, IR exchanged TDC. Pending swallow study with SLP and waiting for dispo options. Pt failed swallow study, placement pending. Working with PT on mobilize pt to sitting in a chair to expand placement options. Pt successfully mobilized using sandee lift but required 2 people to do so. Discussing dispo plan with family, likely d/c home if HD, DME needs able to be met. Pending acceptance at outpatient HD center. Ongoing placement difficulties d/t need for accepting HD facility to have sandee lift with 2 personnel to operate. Family meeting to discuss disposition options planned for Thursday 5/23 at 1 PM. SW onboard [facility] to continue dialysis. Once patient gets accepted at [facility], next step will be to work with daughter on shelter NH placement. Hyperkalamic, given lokelma x1. Hyperkalemic, shifted K with lokelma x 1 and regular insulin. Nephrology to take on dyalisis today. Optimizing insulin BG and insulin management. SW onboard working on paperwork for long term Medicaid application. CT abdomen with correctly placed PEG tube. Re-started TF and ASA. Pending NH placement. Finished vanc. Will transition Zosyn to augmentin per PEG tube. Pending Daughter to sign [facility] documentation as per SW. New episode of fever overnight. Started on vanc/zosyn. Repeated blood cultures, normal lactate. Pending CXR. Re-broadened during the morning  with meropenem with ID consult. ID recommending to continue vanc and meropenem with CT chest showing bilateral ground glass opacities. On 6/15/24 US negative for thrombus. Completed 5 day course of vancomycin on 6/15/24. Will continue on meropenem for 5 days (End date 6/17/24) as per ID recs. Pending NH placement.     Interval History: continue meropenem as per ID recs (end date 6/17/24). Will consider extending AB course if needed. Pending NH placement.    Review of Systems   Unable to perform ROS: Patient nonverbal     Objective:     Vital Signs (Most Recent):  Temp: 97.7 °F (36.5 °C) (06/15/24 1223)  Pulse: 89 (06/15/24 1054)  Resp: (!) 24 (06/15/24 1054)  BP: (!) 113/57 (06/15/24 1054)  SpO2: 98 % (06/15/24 0726) Vital Signs (24h Range):  Temp:  [97.7 °F (36.5 °C)-98.5 °F (36.9 °C)] 97.7 °F (36.5 °C)  Pulse:  [] 89  Resp:  [16-24] 24  SpO2:  [97 %-100 %] 98 %  BP: (107-135)/(57-84) 113/57     Weight: 105.5 kg (232 lb 9.4 oz)  Body mass index is 36.43 kg/m².    Intake/Output Summary (Last 24 hours) at 6/15/2024 1334  Last data filed at 6/15/2024 1154  Gross per 24 hour   Intake 1106.2 ml   Output 2550 ml   Net -1443.8 ml         Physical Exam  Vitals and nursing note reviewed.   Constitutional:       General: She is not in acute distress.     Appearance: She is not ill-appearing, toxic-appearing or diaphoretic.   HENT:      Head: Normocephalic and atraumatic.   Eyes:      General: No scleral icterus.        Right eye: No discharge.         Left eye: No discharge.      Conjunctiva/sclera: Conjunctivae normal.   Neck:      Comments: Decannulated  Cardiovascular:      Rate and Rhythm: Normal rate and regular rhythm.      Heart sounds: Normal heart sounds.   Pulmonary:      Effort: Pulmonary effort is normal. No respiratory distress.   Abdominal:      General: Abdomen is flat. There is no distension.      Tenderness: There is no abdominal tenderness.      Comments: PEG in place.      Musculoskeletal:      Right  lower leg: No edema.      Left lower leg: No edema.   Skin:     General: Skin is warm and dry.   Neurological:      General: No focal deficit present.      Mental Status: She is alert.      Comments: Nonverbal             Significant Labs: All pertinent labs within the past 24 hours have been reviewed.    Significant Imaging: I have reviewed all pertinent imaging results/findings within the past 24 hours.    Assessment/Plan:      * Acute cystitis with hematuria  Resolved       Vulvovaginal candidiasis  Noted 5/29, given 150mg fluconazole x1      Irritant contact dermatitis due friction or contact with other specified body fluids  Wound care following       Debility  Needs:  Wheelchair: patient has a mobility limitation that significantly impairs her ability to participate in one or more mobility related activities of daily living (MRADL's) such as toileting, feeding, dressing, grooming, and bathing in customary locations in the home.  The mobility limitation cannot be sufficiently resolved by the use of a cane or walker.   The use of a manual wheelchair will significantly improve the patient's ability to participate in MRADLS and the patient will use it on regular basis in the home.  Family/pt has expressed her willingness to use a manual wheelchair in the home.  She also has a caregiver who is capable of assisting in mobility.    Mrs Saravia requires a hospital bed due to her requiring positioning of the body in ways not feasible with an ordinary bed to alleviate pain/ is completely immobile /or limited mobility and cannot independently make changes in body position without the use of the bed.  The positioning of the body cannot be sufficiently resolved by the use of pillows and wedges    Patient has a mobility limitation that significantly impairs their ability to participate in one or more mobility related activities of daily living, including toileting. This deficit can be resolved by using a bedside commode.  Patient demonstrates mobility limitations that will cause them to be confined to one room at home without bathroom access for up to 30 days. Using a bedside commode will greatly improve the patient's ability to participate in MRADLs       Complication of vascular dialysis catheter  Cath intermittently clogging, not resolving with cath-hedy, going for IR venogram 4/30 with possible exchange. S/p exchange with IR on 4/30      Constipation  Resolved     Moderate malnutrition  Nutrition consulted. Most recent weight and BMI monitored-     Measurements:  Wt Readings from Last 1 Encounters:   06/11/24 105.5 kg (232 lb 9.4 oz)   Body mass index is 36.43 kg/m².    Patient has been screened and assessed by RD.    Malnutrition Type:  Context: acute illness or injury  Level: moderate    Malnutrition Characteristic Summary:  Weight Loss (Malnutrition): 10% in 6 months  Subcutaneous Fat (Malnutrition): mild depletion  Muscle Mass (Malnutrition): mild depletion  Fluid Accumulation (Malnutrition): mild    Interventions/Recommendations (treatment strategy):  - TF  - previous history of failed swallow studies    Prolonged QT interval  Qtc 526 [04/10/24]      limit Qtc prolonging drugs as able    Spastic hemiplegia of right dominant side as late effect of cerebrovascular disease  Important that patient is able to sit in chair for 4h for dialysis placement needs, even if she requires lift/assistance getting into the chair     This patient has Chronic right hemiplegia due to stroke. Physical therapy services has been scheduled. Continue all standard measures for pressure injury prevention and consult wound care for any wounds (chronic or acute).    ESRD (end stage renal disease) on dialysis  Creatine stable for now. BMP reviewed- noted Estimated Creatinine Clearance: 15.1 mL/min (A) (based on SCr of 5.4 mg/dL (H)). according to latest data. Based on current GFR, CKD stage is end stage.  Monitor UOP and serial BMP and adjust therapy as  needed. Renally dose meds. Avoid nephrotoxic medications and procedures.    SW onboard for placement to [facility] to continue dialysis. Once patient gets accepted at [facility], next step will be to work with daughter on half-way NH placement.     -- HD MWF (~1L fluid removal on average per session)  -- nephro following  - hypophosphatemic on sevelamer. Reached out to nephrology for decrease in phosph binder to improve phosph levels    PEG (percutaneous endoscopic gastrostomy) status  On PEG tube.Wound care with PEG dressing changes.   - CT abdomen with PEG in place.  - TF         Status post tracheostomy  Resolved, decannulated 4/30    Leukocytosis  Resolved.       Acute encephalopathy  Resolved.     Type 2 diabetes mellitus with hyperglycemia, with long-term current use of insulin  Adjusting insulin to account for diabetic TF    Patient's FSGs are controlled on current medication regimen.  Last A1c reviewed-   Lab Results   Component Value Date    HGBA1C 6.2 (H) 05/27/2024     Most recent fingerstick glucose reviewed-   Recent Labs   Lab 06/14/24  2106 06/15/24  0722 06/15/24  1136 06/15/24  1226   POCTGLUCOSE 97 103 136* 129*       Current correctional scale  Medium  Maintain anti-hyperglycemic dose as follows-   Antihyperglycemics (From admission, onward)      Start     Stop Route Frequency Ordered    06/09/24 2100  insulin glargine U-100 (Lantus) pen 13 Units         -- SubQ Nightly 06/09/24 0756    06/09/24 1255  insulin aspart U-100 pen 0-10 Units         -- SubQ Before meals & nightly PRN 06/09/24 1155          Hold Oral hypoglycemics while patient is in the hospital.  POCT glucose checks   -tube feeds     Hyponatremia  Patient has hyponatremia which is uncontrolled,We will aim to correct the sodium by 4-6mEq in 24 hours. We will monitor sodium Daily. The hyponatremia is due to ESRD. Discussed with nephrology, dialysate bath adjusted to account for and correct hyponatremia.  No results for input(s):  ""NA" in the last 24 hours.    - Continue to montior    Sepsis  This patient does have evidence of infective focus  My overall impression is sepsis.  Source: Skin and Soft Tissue (location Abdominal)  Antibiotics given-   Antibiotics (72h ago, onward)      Start     Stop Route Frequency Ordered    06/14/24 0740  vancomycin - pharmacy to dose  (vancomycin IVPB (PEDS and ADULTS))        Placed in "And" Linked Group    -- IV pharmacy to manage frequency 06/14/24 0640    06/13/24 1115  meropenem (MERREM) 500 mg in sodium chloride 0.9 % 100 mL IVPB (MB+)         06/17/24 2359 IV Every 12 hours (non-standard times) 06/13/24 1007          Latest lactate reviewed-  Recent Labs   Lab 06/13/24  0850   LACTATE 1.2         Fluid challenge Contraindicated- Fluid bolus is contraindicated in this patient due to End Stage Renal Disease     Post- resuscitation assessment No - Post resuscitation assessment not needed       Will Not start Pressors- Levophed for MAP of 65  Source control achieved by: vanc/meropenem     Plan:  - Concerns septic source may be PEG site with associated purulent wound despite Wound Care attempts to protect with barriers. PEG placed by General Surgery 2/2024. General Surgery consulted regarding PEG site ordered CT abdomen. Currently in place.   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 Pt underwent extensive course, currently still healing from this, but no longer has wound vac. Pt's wound currently covered with bandage. Picture in media tabs    Plan  Wound care        VTE Risk Mitigation (From admission, onward)           Ordered     heparin (porcine) injection 1,000 Units  As needed (PRN)         " 06/10/24 0035     heparin (porcine) injection 5,000 Units  Every 8 hours         05/29/24 0823     heparin (porcine) injection 3,000 Units  As needed (PRN)         04/17/24 0825                    Discharge Planning   ZEKE: 6/17/2024     Code Status: Full Code   Is the patient medically ready for discharge?: No    Reason for patient still in hospital (select all that apply): Patient trending condition  Discharge Plan A: New Nursing Home placement - skilled nursing care facility ([facility])          Steven Miranda MD  Department of Hospital Medicine   [facility] - Telemetry Stepdown

## 2024-06-16 LAB
BASOPHILS # BLD AUTO: 0.05 K/UL (ref 0–0.2)
BASOPHILS NFR BLD: 0.7 % (ref 0–1.9)
DIFFERENTIAL METHOD BLD: ABNORMAL
EOSINOPHIL # BLD AUTO: 0.5 K/UL (ref 0–0.5)
EOSINOPHIL NFR BLD: 6.5 % (ref 0–8)
ERYTHROCYTE [DISTWIDTH] IN BLOOD BY AUTOMATED COUNT: 15.8 % (ref 11.5–14.5)
HCT VFR BLD AUTO: 29.1 % (ref 37–48.5)
HGB BLD-MCNC: 8.4 G/DL (ref 12–16)
IMM GRANULOCYTES # BLD AUTO: 0.07 K/UL (ref 0–0.04)
IMM GRANULOCYTES NFR BLD AUTO: 0.9 % (ref 0–0.5)
LYMPHOCYTES # BLD AUTO: 1.9 K/UL (ref 1–4.8)
LYMPHOCYTES NFR BLD: 25.6 % (ref 18–48)
MAGNESIUM SERPL-MCNC: 2 MG/DL (ref 1.6–2.6)
MCH RBC QN AUTO: 24.5 PG (ref 27–31)
MCHC RBC AUTO-ENTMCNC: 28.9 G/DL (ref 32–36)
MCV RBC AUTO: 85 FL (ref 82–98)
MONOCYTES # BLD AUTO: 1 K/UL (ref 0.3–1)
MONOCYTES NFR BLD: 13.4 % (ref 4–15)
NEUTROPHILS # BLD AUTO: 4 K/UL (ref 1.8–7.7)
NEUTROPHILS NFR BLD: 52.9 % (ref 38–73)
NRBC BLD-RTO: 0 /100 WBC
PHOSPHATE SERPL-MCNC: 4 MG/DL (ref 2.7–4.5)
PLATELET # BLD AUTO: 302 K/UL (ref 150–450)
PMV BLD AUTO: 9.9 FL (ref 9.2–12.9)
POCT GLUCOSE: 103 MG/DL (ref 70–110)
POCT GLUCOSE: 105 MG/DL (ref 70–110)
POCT GLUCOSE: 111 MG/DL (ref 70–110)
POCT GLUCOSE: 112 MG/DL (ref 70–110)
POTASSIUM SERPL-SCNC: 3.8 MMOL/L (ref 3.5–5.1)
RBC # BLD AUTO: 3.43 M/UL (ref 4–5.4)
SODIUM SERPL-SCNC: 132 MMOL/L (ref 136–145)
WBC # BLD AUTO: 7.59 K/UL (ref 3.9–12.7)

## 2024-06-16 PROCEDURE — 85025 COMPLETE CBC W/AUTO DIFF WBC: CPT | Performed by: STUDENT IN AN ORGANIZED HEALTH CARE EDUCATION/TRAINING PROGRAM

## 2024-06-16 PROCEDURE — 25000003 PHARM REV CODE 250

## 2024-06-16 PROCEDURE — 84100 ASSAY OF PHOSPHORUS: CPT | Performed by: STUDENT IN AN ORGANIZED HEALTH CARE EDUCATION/TRAINING PROGRAM

## 2024-06-16 PROCEDURE — 25000003 PHARM REV CODE 250: Performed by: NURSE PRACTITIONER

## 2024-06-16 PROCEDURE — 25000003 PHARM REV CODE 250: Performed by: STUDENT IN AN ORGANIZED HEALTH CARE EDUCATION/TRAINING PROGRAM

## 2024-06-16 PROCEDURE — 63600175 PHARM REV CODE 636 W HCPCS

## 2024-06-16 PROCEDURE — 83735 ASSAY OF MAGNESIUM: CPT | Performed by: STUDENT IN AN ORGANIZED HEALTH CARE EDUCATION/TRAINING PROGRAM

## 2024-06-16 PROCEDURE — 20600001 HC STEP DOWN PRIVATE ROOM

## 2024-06-16 PROCEDURE — 27000207 HC ISOLATION

## 2024-06-16 PROCEDURE — 84295 ASSAY OF SERUM SODIUM: CPT

## 2024-06-16 PROCEDURE — 36415 COLL VENOUS BLD VENIPUNCTURE: CPT | Performed by: STUDENT IN AN ORGANIZED HEALTH CARE EDUCATION/TRAINING PROGRAM

## 2024-06-16 RX ADMIN — HEPARIN SODIUM 5000 UNITS: 5000 INJECTION INTRAVENOUS; SUBCUTANEOUS at 05:06

## 2024-06-16 RX ADMIN — MEROPENEM 500 MG: 500 INJECTION INTRAVENOUS at 11:06

## 2024-06-16 RX ADMIN — SEVELAMER CARBONATE 0.8 G: 800 POWDER, FOR SUSPENSION ORAL at 09:06

## 2024-06-16 RX ADMIN — SEVELAMER CARBONATE 0.8 G: 800 POWDER, FOR SUSPENSION ORAL at 11:06

## 2024-06-16 RX ADMIN — Medication 500 MG: at 09:06

## 2024-06-16 RX ADMIN — HEPARIN SODIUM 5000 UNITS: 5000 INJECTION INTRAVENOUS; SUBCUTANEOUS at 01:06

## 2024-06-16 RX ADMIN — INSULIN GLARGINE 13 UNITS: 100 INJECTION, SOLUTION SUBCUTANEOUS at 09:06

## 2024-06-16 RX ADMIN — PANTOPRAZOLE SODIUM 40 MG: 40 GRANULE, DELAYED RELEASE ORAL at 09:06

## 2024-06-16 RX ADMIN — METOPROLOL TARTRATE 25 MG: 25 TABLET, FILM COATED ORAL at 09:06

## 2024-06-16 RX ADMIN — SEVELAMER CARBONATE 0.8 G: 800 POWDER, FOR SUSPENSION ORAL at 04:06

## 2024-06-16 RX ADMIN — HEPARIN SODIUM 5000 UNITS: 5000 INJECTION INTRAVENOUS; SUBCUTANEOUS at 09:06

## 2024-06-16 RX ADMIN — POLYETHYLENE GLYCOL 3350 17 G: 17 POWDER, FOR SOLUTION ORAL at 09:06

## 2024-06-16 RX ADMIN — ASPIRIN 81 MG CHEWABLE TABLET 81 MG: 81 TABLET CHEWABLE at 09:06

## 2024-06-16 RX ADMIN — ATORVASTATIN CALCIUM 40 MG: 40 TABLET, FILM COATED ORAL at 09:06

## 2024-06-16 NOTE — NURSING
Nurses Note -- 4 Eyes      6/16/2024   3:43 AM      Skin assessed during: Q Shift Change      [] No Altered Skin Integrity Present    []Prevention Measures Documented      [x] Yes- Altered Skin Integrity Present or Discovered   [] LDA Added if Not in Epic (Describe Wound)   [] New Altered Skin Integrity was Present on Admit and Documented in LDA   [] Wound Image Taken    Wound Care Consulted? No    Attending Nurse:  Sonya Eastman RN/Staff Member:  Charisma.    NO new wounds on pt.  Wound care orders already in chart.

## 2024-06-16 NOTE — SUBJECTIVE & OBJECTIVE
Interval History: RENZO. Continues on Merrem until 6/17 for course completion, completed 5 days Vancomycin. Awaiting placement Monday      Objective:     Vital Signs (Most Recent):  Temp: 98.5 °F (36.9 °C) (06/16/24 0810)  Pulse: 94 (06/16/24 0810)  Resp: 18 (06/16/24 0810)  BP: (!) 146/81 (06/16/24 0810)  SpO2: 96 % (06/16/24 0810) Vital Signs (24h Range):  Temp:  [97.7 °F (36.5 °C)-98.5 °F (36.9 °C)] 98.5 °F (36.9 °C)  Pulse:  [83-94] 94  Resp:  [18-24] 18  SpO2:  [96 %-99 %] 96 %  BP: (113-167)/(57-87) 146/81     Weight: 105.5 kg (232 lb 9.4 oz)  Body mass index is 36.43 kg/m².    Intake/Output Summary (Last 24 hours) at 6/16/2024 0858  Last data filed at 6/16/2024 0622  Gross per 24 hour   Intake 742.5 ml   Output 0 ml   Net 742.5 ml         Physical Exam  Vitals and nursing note reviewed.   Constitutional:       General: She is not in acute distress.     Appearance: She is not ill-appearing, toxic-appearing or diaphoretic.   HENT:      Head: Normocephalic and atraumatic.   Eyes:      General: No scleral icterus.        Right eye: No discharge.         Left eye: No discharge.      Conjunctiva/sclera: Conjunctivae normal.   Neck:      Comments: Decannulated  Cardiovascular:      Rate and Rhythm: Normal rate and regular rhythm.      Heart sounds: Normal heart sounds.   Pulmonary:      Effort: Pulmonary effort is normal. No respiratory distress.   Abdominal:      General: Abdomen is flat. There is no distension.      Tenderness: There is no abdominal tenderness.      Comments: PEG in place.      Musculoskeletal:      Right lower leg: No edema.      Left lower leg: No edema.   Skin:     General: Skin is warm and dry.   Neurological:      General: No focal deficit present.      Mental Status: She is alert.      Comments: Nonverbal             Significant Labs: All pertinent labs within the past 24 hours have been reviewed.    Significant Imaging: I have reviewed all pertinent imaging results/findings within the past  24 hours.

## 2024-06-16 NOTE — PLAN OF CARE
Problem: Skin Injury Risk Increased  Goal: Skin Health and Integrity  Outcome: Progressing  Intervention: Optimize Skin Protection  Flowsheets (Taken 6/16/2024 7596)  Pressure Reduction Techniques: weight shift assistance provided  Pressure Reduction Devices:   positioning supports utilized   specialty bed utilized  Skin Protection: incontinence pads utilized  Activity Management: Arm raise - L1  Head of Bed (HOB) Positioning: HOB at 30-45 degrees     Patient awake, alert, pt is non-verbal .  Pain assessed using FLACC scale.  Pt does not exhibit any signs of pain.  Antibiotic administered as ordered.  pt educated on safety, and medication use..  Plan of care discussed with patient,  call light within reach. Bed alarm on.  Bed  low and locked.

## 2024-06-16 NOTE — PROGRESS NOTES
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 History was given from patient's daughter. She was undergoing dialysis today and noticed she was lethargic, less alert than usual self. Pt completed dialysis and still not acting herself. EMS was called, fever of a 100.  On chart review, she did have an episode of large volume emesis around 1700.  Per EMS, she had a slightly elevated temp 100.0°F, glucose 300s.     In the ED: UA 2+ leuks, >100 WBC, many bacteria, WBC 17, CT abd/pelvis concerning for cystitis. Given vanc/zosyn    Overview/Hospital Course:  Patient was admitted to Hospital Medicine service for medical management and evaluation of urosepsis. Patient was continued on vanc/zosyn. Vascular Neurology consulted concerning mental status change with the recommendation to initiate ASA 81 QD and to obtain an MRI to r/o new stroke. Imaging pending. Nephrology was consulted for regularly scheduled dialysis. Afternoon of 4/12, patient was unable to tolerate filtration of volume during dialsis 2/2 hypotension. Patient received 500cc bolus and was transferred back to floor after finishing the session without volume removed. Will monitor for signs of volume overload. Tailoring insulin regimen while uptitrating tube feeds to goal rate. Staph Epi in all 4 bottles; ID with recommendation to continue Vanc & de-escalate meropenem to ertapenem on 04/15 through 4/16. Patient completed IV course of UTI coverage. Patient tolerated volume removal well in  dialysis throughout the rest of her hospital stay. Pending discharge to LTACH pending bed availability. Patient without BM with one episode of vomiting overnight 4/17. KUB with bowel gas and low concern for obstruction with no transition point noted. Escalating bowel regimen. Pending placement as of 4/22. Pulm consult placed to evaluate for possible trach downsize & eventual decannulation to assist placement if safe. Trach capping trial per pulm for 48h and will assess for decannulation on Monday. DVT studies negative. Pulm successfully decannulated pt 4/30, IR exchanged TDC. Pending swallow study with SLP and waiting for dispo options. Pt failed swallow study, placement pending. Working with PT on mobilize pt to sitting in a chair to expand placement options. Pt successfully mobilized using sandee lift but required 2 people to do so. Discussing dispo plan with family, likely d/c home if HD, DME needs able to be met. Pending acceptance at outpatient HD center. Ongoing placement difficulties d/t need for accepting HD facility to have sandee lift with 2 personnel to operate. Family meeting to discuss disposition options planned for Thursday 5/23 at 1 PM. SW onboard for placement to [facility] to continue dialysis. Once patient gets accepted at [facility], next step will be to work with daughter on [facility] NH placement. Hyperkalamic, given lokelma x1. Hyperkalemic, shifted K with lokelma x 1 and regular insulin. Nephrology to take on dyalisis today. Optimizing insulin BG and insulin management. SW onboard working on paperwork for long term Medicaid application. CT abdomen with correctly placed PEG tube. Re-started TF and ASA. Pending NH placement. Finished vanc. Will transition Zosyn to augmentin per PEG tube. Pending Daughter to sign [facility] documentation as per SW. New episode of fever overnight. Started on vanc/zosyn. Repeated blood cultures, normal lactate. Pending CXR. Re-broadened during the morning  with meropenem with ID consult. ID recommending to continue vanc and meropenem with CT chest showing bilateral ground glass opacities. On 6/15/24 US negative for thrombus. Completed 5 day course of vancomycin on 6/15/24. Will continue on meropenem for 5 days (End date 6/17/24) as per ID recs. Pending NH placement.     Interval History: NAEON. Continues on Merrem until 6/17 for course completion, completed 5 days Vancomycin. Awaiting placement Monday      Objective:     Vital Signs (Most Recent):  Temp: 98.5 °F (36.9 °C) (06/16/24 0810)  Pulse: 94 (06/16/24 0810)  Resp: 18 (06/16/24 0810)  BP: (!) 146/81 (06/16/24 0810)  SpO2: 96 % (06/16/24 0810) Vital Signs (24h Range):  Temp:  [97.7 °F (36.5 °C)-98.5 °F (36.9 °C)] 98.5 °F (36.9 °C)  Pulse:  [83-94] 94  Resp:  [18-24] 18  SpO2:  [96 %-99 %] 96 %  BP: (113-167)/(57-87) 146/81     Weight: 105.5 kg (232 lb 9.4 oz)  Body mass index is 36.43 kg/m².    Intake/Output Summary (Last 24 hours) at 6/16/2024 0858  Last data filed at 6/16/2024 0622  Gross per 24 hour   Intake 742.5 ml   Output 0 ml   Net 742.5 ml         Physical Exam  Vitals and nursing note reviewed.   Constitutional:       General: She is not in acute distress.     Appearance: She is not ill-appearing, toxic-appearing or diaphoretic.   HENT:      Head: Normocephalic and atraumatic.   Eyes:      General: No scleral icterus.        Right eye: No discharge.         Left eye: No discharge.      Conjunctiva/sclera: Conjunctivae normal.   Neck:      Comments: Decannulated  Cardiovascular:      Rate and Rhythm: Normal rate and regular rhythm.      Heart sounds: Normal heart sounds.   Pulmonary:      Effort: Pulmonary effort is normal. No respiratory distress.   Abdominal:      General: Abdomen is flat. There is no distension.      Tenderness: There is no abdominal tenderness.      Comments: PEG in place.      Musculoskeletal:      Right lower leg: No edema.      Left lower leg: No edema.   Skin:     General: Skin  is warm and dry.   Neurological:      General: No focal deficit present.      Mental Status: She is alert.      Comments: Nonverbal             Significant Labs: All pertinent labs within the past 24 hours have been reviewed.    Significant Imaging: I have reviewed all pertinent imaging results/findings within the past 24 hours.    Assessment/Plan:      * Acute cystitis with hematuria  Resolved       Vulvovaginal candidiasis  Noted 5/29, given 150mg fluconazole x1      Irritant contact dermatitis due friction or contact with other specified body fluids  Wound care following       Debility  Needs:  Wheelchair: patient has a mobility limitation that significantly impairs her ability to participate in one or more mobility related activities of daily living (MRADL's) such as toileting, feeding, dressing, grooming, and bathing in customary locations in the home.  The mobility limitation cannot be sufficiently resolved by the use of a cane or walker.   The use of a manual wheelchair will significantly improve the patient's ability to participate in MRADLS and the patient will use it on regular basis in the home.  Family/pt has expressed her willingness to use a manual wheelchair in the home.  She also has a caregiver who is capable of assisting in mobility.    Mrs Saravia requires a hospital bed due to her requiring positioning of the body in ways not feasible with an ordinary bed to alleviate pain/ is completely immobile /or limited mobility and cannot independently make changes in body position without the use of the bed.  The positioning of the body cannot be sufficiently resolved by the use of pillows and wedges    Patient has a mobility limitation that significantly impairs their ability to participate in one or more mobility related activities of daily living, including toileting. This deficit can be resolved by using a bedside commode. Patient demonstrates mobility limitations that will cause them to be confined to  one room at home without bathroom access for up to 30 days. Using a bedside commode will greatly improve the patient's ability to participate in MRADLs       Complication of vascular dialysis catheter  Cath intermittently clogging, not resolving with cath-hedy, going for IR venogram 4/30 with possible exchange. S/p exchange with IR on 4/30      Constipation  Resolved     Moderate malnutrition  Nutrition consulted. Most recent weight and BMI monitored-     Measurements:  Wt Readings from Last 1 Encounters:   06/11/24 105.5 kg (232 lb 9.4 oz)   Body mass index is 36.43 kg/m².    Patient has been screened and assessed by RD.    Malnutrition Type:  Context: acute illness or injury  Level: moderate    Malnutrition Characteristic Summary:  Weight Loss (Malnutrition): 10% in 6 months  Subcutaneous Fat (Malnutrition): mild depletion  Muscle Mass (Malnutrition): mild depletion  Fluid Accumulation (Malnutrition): mild    Interventions/Recommendations (treatment strategy):  - TF  - previous history of failed swallow studies    Prolonged QT interval  Qtc 526 [04/10/24]      limit Qtc prolonging drugs as able    Spastic hemiplegia of right dominant side as late effect of cerebrovascular disease  Important that patient is able to sit in chair for 4h for dialysis placement needs, even if she requires lift/assistance getting into the chair     This patient has Chronic right hemiplegia due to stroke. Physical therapy services has been scheduled. Continue all standard measures for pressure injury prevention and consult wound care for any wounds (chronic or acute).    ESRD (end stage renal disease) on dialysis  Creatine stable for now. BMP reviewed- noted Estimated Creatinine Clearance: 15.1 mL/min (A) (based on SCr of 5.4 mg/dL (H)). according to latest data. Based on current GFR, CKD stage is end stage.  Monitor UOP and serial BMP and adjust therapy as needed. Renally dose meds. Avoid nephrotoxic medications and procedures.    SW  "onboard for placement to [facility] to continue dialysis. Once patient gets accepted at [facility], next step will be to work with daughter on skilled nursing NH placement.     -- HD MWF (~1L fluid removal on average per session)  -- nephro following  - hypophosphatemic on sevelamer. Reached out to nephrology for decrease in phosph binder to improve phosph levels    PEG (percutaneous endoscopic gastrostomy) status  On PEG tube.Wound care with PEG dressing changes.   - CT abdomen with PEG in place.  - TF         Status post tracheostomy  Resolved, decannulated 4/30    Leukocytosis  Resolved.       Acute encephalopathy  Resolved.     Type 2 diabetes mellitus with hyperglycemia, with long-term current use of insulin  Adjusting insulin to account for diabetic TF    Patient's FSGs are controlled on current medication regimen.  Last A1c reviewed-   Lab Results   Component Value Date    HGBA1C 6.2 (H) 05/27/2024     Most recent fingerstick glucose reviewed-   Recent Labs   Lab 06/14/24  2106 06/15/24  0722 06/15/24  1136 06/15/24  1226   POCTGLUCOSE 97 103 136* 129*       Current correctional scale  Medium  Maintain anti-hyperglycemic dose as follows-   Antihyperglycemics (From admission, onward)      Start     Stop Route Frequency Ordered    06/09/24 2100  insulin glargine U-100 (Lantus) pen 13 Units         -- SubQ Nightly 06/09/24 0756    06/09/24 1255  insulin aspart U-100 pen 0-10 Units         -- SubQ Before meals & nightly PRN 06/09/24 1155          Hold Oral hypoglycemics while patient is in the hospital.  POCT glucose checks   -tube feeds     Hyponatremia  Patient has hyponatremia which is uncontrolled,We will aim to correct the sodium by 4-6mEq in 24 hours. We will monitor sodium Daily. The hyponatremia is due to ESRD. Discussed with nephrology, dialysate bath adjusted to account for and correct hyponatremia.  No results for input(s): "NA" in the last 24 hours.    - Continue to montior    Sepsis  This patient does " "have evidence of infective focus  My overall impression is sepsis.  Source: Skin and Soft Tissue (location Abdominal)  Antibiotics given-   Antibiotics (72h ago, onward)      Start     Stop Route Frequency Ordered    06/13/24 1115  meropenem (MERREM) 500 mg in sodium chloride 0.9 % 100 mL IVPB (MB+)         06/17/24 2359 IV Every 12 hours (non-standard times) 06/13/24 1007          Latest lactate reviewed-  No results for input(s): "LACTATE", "POCLAC" in the last 72 hours.      Fluid challenge Contraindicated- Fluid bolus is contraindicated in this patient due to End Stage Renal Disease     Post- resuscitation assessment No - Post resuscitation assessment not needed       Will Not start Pressors- Levophed for MAP of 65  Source control achieved by: vanc/meropenem     Plan:  - Concerns septic source may be PEG site with associated purulent wound despite Wound Care attempts to protect with barriers. PEG placed by General Surgery 2/2024. General Surgery consulted regarding PEG site ordered CT abdomen. Currently in place.   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 Pt underwent extensive course, currently still healing from this, but no longer has wound vac. Pt's wound currently covered with bandage. Picture in media tabs    Plan  Wound care        VTE Risk Mitigation (From admission, onward)           Ordered     heparin (porcine) injection 1,000 Units  As needed (PRN)         06/10/24 0035     heparin (porcine) injection 5,000 Units  Every 8 hours         05/29/24 0823     heparin (porcine) injection 3,000 Units  As needed (PRN)         04/17/24 0825                    Discharge Planning   ZEKE: 6/17/2024     " Code Status: Full Code   Is the patient medically ready for discharge?: No    Reason for patient still in hospital (select all that apply): Pending disposition  Discharge Plan A: New Nursing Home placement - correction care facility ([facility])                  Jarrett Eason DO  Department of Hospital Medicine   [facility] - Telemetry Stepdown

## 2024-06-16 NOTE — ASSESSMENT & PLAN NOTE
"This patient does have evidence of infective focus  My overall impression is sepsis.  Source: Skin and Soft Tissue (location Abdominal)  Antibiotics given-   Antibiotics (72h ago, onward)    Start     Stop Route Frequency Ordered    06/13/24 1115  meropenem (MERREM) 500 mg in sodium chloride 0.9 % 100 mL IVPB (MB+)         06/17/24 2359 IV Every 12 hours (non-standard times) 06/13/24 1007        Latest lactate reviewed-  No results for input(s): "LACTATE", "POCLAC" in the last 72 hours.      Fluid challenge Contraindicated- Fluid bolus is contraindicated in this patient due to End Stage Renal Disease     Post- resuscitation assessment No - Post resuscitation assessment not needed       Will Not start Pressors- Levophed for MAP of 65  Source control achieved by: vanc/meropenem     Plan:  - Concerns septic source may be PEG site with associated purulent wound despite Wound Care attempts to protect with barriers. PEG placed by General Surgery 2/2024. General Surgery consulted regarding PEG site ordered CT abdomen. Currently in place.   - continue meropenem as ID recs for 5 day course; aspiration as possible cause of infection  - End date AB: 6/17/24 - may consider extending if patient continues hospitalized    - already completed 5 days vancomycin   - US extremities negative for thrombus   "

## 2024-06-17 PROBLEM — N17.0 ACUTE RENAL FAILURE WITH TUBULAR NECROSIS: Status: RESOLVED | Noted: 2024-01-30 | Resolved: 2024-06-17

## 2024-06-17 PROBLEM — J96.01 ACUTE HYPOXEMIC RESPIRATORY FAILURE: Status: RESOLVED | Noted: 2024-02-02 | Resolved: 2024-06-17

## 2024-06-17 LAB
ALBUMIN SERPL BCP-MCNC: 2.6 G/DL (ref 3.5–5.2)
ANION GAP SERPL CALC-SCNC: 14 MMOL/L (ref 8–16)
BASOPHILS # BLD AUTO: 0.05 K/UL (ref 0–0.2)
BASOPHILS NFR BLD: 0.6 % (ref 0–1.9)
BUN SERPL-MCNC: 23 MG/DL (ref 6–20)
CALCIUM SERPL-MCNC: 9.3 MG/DL (ref 8.7–10.5)
CHLORIDE SERPL-SCNC: 97 MMOL/L (ref 95–110)
CO2 SERPL-SCNC: 20 MMOL/L (ref 23–29)
CREAT SERPL-MCNC: 6.5 MG/DL (ref 0.5–1.4)
DIFFERENTIAL METHOD BLD: ABNORMAL
EOSINOPHIL # BLD AUTO: 0.6 K/UL (ref 0–0.5)
EOSINOPHIL NFR BLD: 7.1 % (ref 0–8)
ERYTHROCYTE [DISTWIDTH] IN BLOOD BY AUTOMATED COUNT: 15.9 % (ref 11.5–14.5)
EST. GFR  (NO RACE VARIABLE): 7.1 ML/MIN/1.73 M^2
GLUCOSE SERPL-MCNC: 90 MG/DL (ref 70–110)
HCT VFR BLD AUTO: 29.1 % (ref 37–48.5)
HGB BLD-MCNC: 8.7 G/DL (ref 12–16)
IMM GRANULOCYTES # BLD AUTO: 0.08 K/UL (ref 0–0.04)
IMM GRANULOCYTES NFR BLD AUTO: 1 % (ref 0–0.5)
LYMPHOCYTES # BLD AUTO: 2.1 K/UL (ref 1–4.8)
LYMPHOCYTES NFR BLD: 26.5 % (ref 18–48)
MAGNESIUM SERPL-MCNC: 2.1 MG/DL (ref 1.6–2.6)
MCH RBC QN AUTO: 25 PG (ref 27–31)
MCHC RBC AUTO-ENTMCNC: 29.9 G/DL (ref 32–36)
MCV RBC AUTO: 84 FL (ref 82–98)
MONOCYTES # BLD AUTO: 0.8 K/UL (ref 0.3–1)
MONOCYTES NFR BLD: 10.3 % (ref 4–15)
NEUTROPHILS # BLD AUTO: 4.3 K/UL (ref 1.8–7.7)
NEUTROPHILS NFR BLD: 54.5 % (ref 38–73)
NRBC BLD-RTO: 0 /100 WBC
PHOSPHATE SERPL-MCNC: 4.3 MG/DL (ref 2.7–4.5)
PHOSPHATE SERPL-MCNC: 4.3 MG/DL (ref 2.7–4.5)
PLATELET # BLD AUTO: 335 K/UL (ref 150–450)
PMV BLD AUTO: 9.1 FL (ref 9.2–12.9)
POCT GLUCOSE: 89 MG/DL (ref 70–110)
POCT GLUCOSE: 99 MG/DL (ref 70–110)
POTASSIUM SERPL-SCNC: 4.1 MMOL/L (ref 3.5–5.1)
RBC # BLD AUTO: 3.48 M/UL (ref 4–5.4)
SODIUM SERPL-SCNC: 131 MMOL/L (ref 136–145)
SODIUM SERPL-SCNC: 131 MMOL/L (ref 136–145)
WBC # BLD AUTO: 7.89 K/UL (ref 3.9–12.7)

## 2024-06-17 PROCEDURE — 63600175 PHARM REV CODE 636 W HCPCS: Mod: JZ | Performed by: NURSE PRACTITIONER

## 2024-06-17 PROCEDURE — 25000003 PHARM REV CODE 250

## 2024-06-17 PROCEDURE — 27000207 HC ISOLATION

## 2024-06-17 PROCEDURE — 25000003 PHARM REV CODE 250: Performed by: NURSE PRACTITIONER

## 2024-06-17 PROCEDURE — 63600175 PHARM REV CODE 636 W HCPCS: Performed by: NURSE PRACTITIONER

## 2024-06-17 PROCEDURE — 63600175 PHARM REV CODE 636 W HCPCS: Performed by: FAMILY MEDICINE

## 2024-06-17 PROCEDURE — 25000003 PHARM REV CODE 250: Performed by: FAMILY MEDICINE

## 2024-06-17 PROCEDURE — 97535 SELF CARE MNGMENT TRAINING: CPT

## 2024-06-17 PROCEDURE — 86580 TB INTRADERMAL TEST: CPT | Performed by: FAMILY MEDICINE

## 2024-06-17 PROCEDURE — 99232 SBSQ HOSP IP/OBS MODERATE 35: CPT | Mod: ,,, | Performed by: INTERNAL MEDICINE

## 2024-06-17 PROCEDURE — 63600175 PHARM REV CODE 636 W HCPCS

## 2024-06-17 PROCEDURE — 20600001 HC STEP DOWN PRIVATE ROOM

## 2024-06-17 PROCEDURE — 80069 RENAL FUNCTION PANEL: CPT

## 2024-06-17 PROCEDURE — 30200315 PPD INTRADERMAL TEST REV CODE 302: Performed by: FAMILY MEDICINE

## 2024-06-17 PROCEDURE — 80100014 HC HEMODIALYSIS 1:1

## 2024-06-17 PROCEDURE — 36415 COLL VENOUS BLD VENIPUNCTURE: CPT | Performed by: STUDENT IN AN ORGANIZED HEALTH CARE EDUCATION/TRAINING PROGRAM

## 2024-06-17 PROCEDURE — 97530 THERAPEUTIC ACTIVITIES: CPT

## 2024-06-17 PROCEDURE — 83735 ASSAY OF MAGNESIUM: CPT | Performed by: STUDENT IN AN ORGANIZED HEALTH CARE EDUCATION/TRAINING PROGRAM

## 2024-06-17 PROCEDURE — 85025 COMPLETE CBC W/AUTO DIFF WBC: CPT | Performed by: STUDENT IN AN ORGANIZED HEALTH CARE EDUCATION/TRAINING PROGRAM

## 2024-06-17 PROCEDURE — 25000003 PHARM REV CODE 250: Performed by: STUDENT IN AN ORGANIZED HEALTH CARE EDUCATION/TRAINING PROGRAM

## 2024-06-17 RX ORDER — MEROPENEM AND SODIUM CHLORIDE 500 MG/50ML
500 INJECTION, SOLUTION INTRAVENOUS ONCE
Status: DISCONTINUED | OUTPATIENT
Start: 2024-06-17 | End: 2024-06-17

## 2024-06-17 RX ORDER — INSULIN GLARGINE 100 [IU]/ML
13 INJECTION, SOLUTION SUBCUTANEOUS NIGHTLY
Start: 2024-06-17 | End: 2025-06-17

## 2024-06-17 RX ORDER — POLYETHYLENE GLYCOL 3350 17 G/17G
17 POWDER, FOR SOLUTION ORAL 2 TIMES DAILY PRN
Start: 2024-06-17

## 2024-06-17 RX ORDER — SEVELAMER CARBONATE FOR ORAL SUSPENSION 800 MG/1
0.8 POWDER, FOR SUSPENSION ORAL
Start: 2024-06-17 | End: 2024-07-01 | Stop reason: HOSPADM

## 2024-06-17 RX ADMIN — HEPARIN SODIUM 5000 UNITS: 5000 INJECTION INTRAVENOUS; SUBCUTANEOUS at 01:06

## 2024-06-17 RX ADMIN — HEPARIN SODIUM 5000 UNITS: 5000 INJECTION INTRAVENOUS; SUBCUTANEOUS at 05:06

## 2024-06-17 RX ADMIN — ATORVASTATIN CALCIUM 40 MG: 40 TABLET, FILM COATED ORAL at 08:06

## 2024-06-17 RX ADMIN — HEPARIN SODIUM 3000 UNITS: 1000 INJECTION, SOLUTION INTRAVENOUS; SUBCUTANEOUS at 06:06

## 2024-06-17 RX ADMIN — METOPROLOL TARTRATE 25 MG: 25 TABLET, FILM COATED ORAL at 08:06

## 2024-06-17 RX ADMIN — HEPARIN SODIUM 3000 UNITS: 1000 INJECTION, SOLUTION INTRAVENOUS; SUBCUTANEOUS at 03:06

## 2024-06-17 RX ADMIN — Medication 500 MG: at 08:06

## 2024-06-17 RX ADMIN — PANTOPRAZOLE SODIUM 40 MG: 40 GRANULE, DELAYED RELEASE ORAL at 08:06

## 2024-06-17 RX ADMIN — SEVELAMER CARBONATE 0.8 G: 800 POWDER, FOR SUSPENSION ORAL at 11:06

## 2024-06-17 RX ADMIN — SODIUM CHLORIDE: 9 INJECTION, SOLUTION INTRAVENOUS at 03:06

## 2024-06-17 RX ADMIN — ERYTHROPOIETIN 6100 UNITS: 10000 INJECTION, SOLUTION INTRAVENOUS; SUBCUTANEOUS at 11:06

## 2024-06-17 RX ADMIN — MEROPENEM 500 MG: 500 INJECTION INTRAVENOUS at 11:06

## 2024-06-17 RX ADMIN — INSULIN GLARGINE 13 UNITS: 100 INJECTION, SOLUTION SUBCUTANEOUS at 08:06

## 2024-06-17 RX ADMIN — SEVELAMER CARBONATE 0.8 G: 800 POWDER, FOR SUSPENSION ORAL at 08:06

## 2024-06-17 RX ADMIN — POLYETHYLENE GLYCOL 3350 17 G: 17 POWDER, FOR SOLUTION ORAL at 08:06

## 2024-06-17 RX ADMIN — ASPIRIN 81 MG CHEWABLE TABLET 81 MG: 81 TABLET CHEWABLE at 08:06

## 2024-06-17 RX ADMIN — TUBERCULIN PURIFIED PROTEIN DERIVATIVE 5 UNITS: 5 INJECTION, SOLUTION INTRADERMAL at 11:06

## 2024-06-17 RX ADMIN — HEPARIN SODIUM 5000 UNITS: 5000 INJECTION INTRAVENOUS; SUBCUTANEOUS at 09:06

## 2024-06-17 RX ADMIN — MEROPENEM 500 MG: 500 INJECTION INTRAVENOUS at 09:06

## 2024-06-17 NOTE — NURSING
HD complete. 2L removed. RIJ CVC heparin locked. Report given to Rhett BARBER. Awaiting transport to room via pt bed

## 2024-06-17 NOTE — PLAN OF CARE
Problem: Chronic Kidney Disease  Goal: Fluid Balance  Outcome: Progressing     Problem: Chronic Kidney Disease  Goal: Electrolyte Balance  Outcome: Progressing

## 2024-06-17 NOTE — ASSESSMENT & PLAN NOTE
On PEG tube.Wound care with PEG dressing changes.   - CT abdomen with PEG in correct location  - TF

## 2024-06-17 NOTE — ASSESSMENT & PLAN NOTE
Important that patient is able to sit in chair for 4h for dialysis placement needs, even if she requires lift/assistance getting into the chair.This patient has Chronic right hemiplegia due to stroke. Physical therapy services has been scheduled. Continue all standard measures for pressure injury prevention and consult wound care for any wounds (chronic or acute).

## 2024-06-17 NOTE — PT/OT/SLP PROGRESS
Occupational Therapy   Treatment    Name: Sarah Saravia  MRN: [id number]  Admitting Diagnosis:  Acute cystitis with hematuria       Recommendations:     Discharge Recommendations: Moderate Intensity Therapy  Discharge Equipment Recommendations:  hospital bed, lift device, wheelchair  Barriers to discharge:  Other (Comment) (requires significant assist)    Assessment:     Sarah Saravia is a 53 y.o. female with a medical diagnosis of Acute cystitis with hematuria.  She presents with deficits in self-care tasks, communication and mobility. Pt. Continues to require significant assist for all mobility. Once seated EOB is able to maintain with SBA. Pt. Also requires significant assist for ADL tasks. Patient would benefit from continued OT services to maximize level of safety and independence with self-care tasks.   . Performance deficits affecting function are weakness, impaired endurance, impaired self care skills, impaired functional mobility, impaired cognition, decreased lower extremity function, pain.     Rehab Prognosis:  Fair; patient would benefit from acute skilled OT services to address these deficits and reach maximum level of function.       Plan:     Patient to be seen 3 x/week to address the above listed problems via self-care/home management, therapeutic activities, therapeutic exercises, neuromuscular re-education, cognitive retraining  Plan of Care Expires: 06/19/24  Plan of Care Reviewed with: patient    Subjective     Chief Complaint: Pt. Appeared to have some pain in right hip  Patient/Family Comments/goals: none stated   Pain/Comfort:  Pain Rating 1:  (did not rate)  Location - Side 1: Right  Location - Orientation 1: upper  Location 1: leg (grimaced with mobility)  Pain Addressed 1: Reposition, Distraction  Pain Rating Post-Intervention 1:  (no increases in pain noted and only with movement)    Objective:     Communicated with: nurse prior to session.  Patient found supine with PEG Tube upon OT  entry to room.    General Precautions: Standard, aphasia, fall, contact, NPO, aspiration    Orthopedic Precautions:N/A  Braces: N/A  Respiratory Status: Room air     Occupational Performance:     Bed Mobility:    Patient completed Rolling/Turning to Left with  total assistance and 2 persons  Patient completed Scooting/Bridging with total assistance and 2 persons  Patient completed Supine to Sit with total assistance and 2 persons     Functional Mobility/Transfers:  Patient completed Sit <> Stand Transfer with total assistance and of 2 persons  with  no assistive device x 2 trials from EOB; scoot along EOB to HOB total A x 2 x 3 trials  Functional Mobility: not tested    Activities of Daily Living:  Grooming: maximal assistance applied lip balm and some lotion; required assist to wash face and swab mouth      AMPAC 6 Click ADL:      Treatment & Education:  Pt. Sat EOB with SBA  Pt. Followed demonstrated commands inconsistently  Pt. Alert throughout session    Patient left supine with HOB elevated to ~ 50 degrees  with all lines intact and call button in reach    GOALS:   Multidisciplinary Problems       Occupational Therapy Goals          Problem: Occupational Therapy    Goal Priority Disciplines Outcome Interventions   Occupational Therapy Goal     OT, PT/OT Progressing    Description: Goals to be met by: 5/22/24 Goals revised as needed on 05-30 to be met by 06-19:    Patient will increase functional independence with ADLs by performing:    UE Dressing with Maximum Assistance.MET 05-30  Revised: UBD with Min A  Grooming while EOB with Maximum Assistance.Met 05-30  Revised: Grooming seated EOB with CGA  Sitting at edge of bed x5 minutes with Maximum Assistance. - Met 5/22  Revised: Sit EOB x 20 minutes with SBA  MET 06-10-24  Rolling to Bilateral with Moderate Assistance.NOT MET     Supine to sit with Maximum Assistance. NOT MET                           Time Tracking:     OT Date of Treatment: 06/17/24  OT Start  Time: 1030  OT Stop Time: 1059  OT Total Time (min): 29 min    Billable Minutes:Self Care/Home Management 15  Therapeutic Activity 14    OT/JOSÉ: OT     Number of JOSÉ visits since last OT visit: 0    6/17/2024

## 2024-06-17 NOTE — ASSESSMENT & PLAN NOTE
Adjusting insulin to account for diabetic TF    Patient's FSGs are controlled on current medication regimen.  Last A1c reviewed-   Lab Results   Component Value Date    HGBA1C 6.2 (H) 05/27/2024     Most recent fingerstick glucose reviewed-   Recent Labs   Lab 06/16/24  1702 06/16/24  2200 06/17/24  0817   POCTGLUCOSE 105 103 99       Current correctional scale  Medium  Maintain anti-hyperglycemic dose as follows-   Antihyperglycemics (From admission, onward)      Start     Stop Route Frequency Ordered    06/09/24 2100  insulin glargine U-100 (Lantus) pen 13 Units         -- SubQ Nightly 06/09/24 0756    06/09/24 1255  insulin aspart U-100 pen 0-10 Units         -- SubQ Before meals & nightly PRN 06/09/24 1155          Hold Oral hypoglycemics while patient is in the hospital.  POCT glucose checks   Continue tube feeds

## 2024-06-17 NOTE — NURSING
Nurses Note -- 4 Eyes      6/17/2024   3:47 AM      Skin assessed during: Q Shift Change      [] No Altered Skin Integrity Present    []Prevention Measures Documented      [x] Yes- Altered Skin Integrity Present or Discovered   [] LDA Added if Not in Epic (Describe Wound)   [] New Altered Skin Integrity was Present on Admit and Documented in LDA   [] Wound Image Taken    Wound Care Consulted? No    Attending Nurse:  Sonya Eastman RN/Staff Member ELISABETH Zafar         No new skin breakdown noted.  Wound care orders already in place.

## 2024-06-17 NOTE — ASSESSMENT & PLAN NOTE
RESOLVED: This patient does have evidence of infective focus. My overall impression is sepsis. Source: Skin and Soft Tissue (location Abdominal). Not requiring pressors. Source control achieved by: vanc/meropenem.    - Concerns septic source may be PEG site with associated purulent wound despite Wound Care attempts to protect with barriers. PEG placed by General Surgery 2/2024. General Surgery consulted regarding PEG site ordered CT abdomen. Currently in place.   - Continue meropenem as ID recs for 5 day course; aspiration as possible cause of infection  - End date AB: 6/17/24  - Already completed 5 days vancomycin   - US extremities negative for thrombus   - Stable for dc to NH pending HD today and completion of antibiotics

## 2024-06-17 NOTE — PT/OT/SLP PROGRESS
Physical Therapy      Patient Name:  Sarah Saravia   MRN:  [id number]    Patient not seen today secondary to Dialysis, Other (Comment) (2 attempts for tx session: 1. OT just finishing up working with pt at 11:00 am; 2. Pt URIEL away for HD in pm). Will follow-up on next scheduled visit.

## 2024-06-17 NOTE — ASSESSMENT & PLAN NOTE
Creatine stable for now. BMP reviewed- noted Estimated Creatinine Clearance: 12.5 mL/min (A) (based on SCr of 6.5 mg/dL (H)). according to latest data. Based on current GFR, CKD stage is end stage.  Monitor UOP and serial BMP and adjust therapy as needed. Renally dose meds. Avoid nephrotoxic medications and procedures.    -- HD MWF (~1L fluid removal on average per session)  -- nephro following  -- Hypophosphatemic on sevelamer. Reached out to nephrology for decrease in phosph binder to improve phosph levels  --SW onboard for placement to [facility] to continue dialysis. Once patient gets accepted at [facility], next step will be to work with daughter on prison NH placement.

## 2024-06-17 NOTE — ASSESSMENT & PLAN NOTE
Patient has hyponatremia which is uncontrolled,We will aim to correct the sodium by 4-6mEq in 24 hours. We will monitor sodium Daily. The hyponatremia is due to ESRD. Discussed with nephrology, dialysate bath adjusted to account for and correct hyponatremia.  Recent Labs   Lab 06/17/24  0546   *  131*

## 2024-06-17 NOTE — PLAN OF CARE
NURSING HOME ORDERS    07/08/2024  [facility]  MARGARET SILVA - TELEMETRY STEPDOWN  [address]  [facility] [address]  Dept: [phone]  Loc: [phone]     Admit to Nursing Home:  retirement Nursing Facility    Diagnoses:  Active Hospital Problems    Diagnosis  POA    *Sepsis [A41.9]  Yes     Priority: 1 - High    Hyponatremia [E87.1]  Yes     Priority: 2      POA, Na      ESRD (end stage renal disease) on dialysis [N18.6, Z99.2]  Not Applicable     Priority: 3     Complication of vascular dialysis catheter [T82.9XXA]  No     Priority: 4     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]  Yes     Priority: 5     Debility [R53.81]  Yes     Priority: 6     Constipation [K59.00]  No     Priority: 7     Cervical stenosis of spinal canal [M48.02]  Yes     Priority: 8     Intertrigo [L30.4]  Yes    Irritant contact dermatitis due friction or contact with other specified body fluids [L24.A9]  No    Moderate malnutrition [E44.0]  Yes    Prolonged QT interval [R94.31]  Yes    Spastic hemiplegia of right dominant side as late effect of cerebrovascular disease [I69.951]  Not Applicable    PEG (percutaneous endoscopic gastrostomy) status [Z93.1]  Not Applicable    Leukocytosis [D72.829]  Yes    Type 2 diabetes mellitus with hyperglycemia, with long-term current use of insulin [E11.65, Z79.4]  Not Applicable      Resolved Hospital Problems    Diagnosis Date Resolved POA    Vulvovaginal candidiasis [B37.31] 06/24/2024 No    Acute cystitis with hematuria [N30.01] 06/19/2024 Yes    Hypermagnesemia [E83.41] 04/26/2024 Yes     POA, Mg 3.2  Daily chem       Status post tracheostomy [Z93.0] 06/19/2024 Not Applicable    Acute encephalopathy [G93.40] 06/19/2024 Yes       Patient is homebound due to:  Sepsis    Allergies:Review of patient's allergies indicates:  No Known Allergies    Vitals:  Routine    Diet:  NPO with tube feeds: Novasource Renal 15mL/hr, per G tube    Activities:   Activity as tolerated    Goals of Care  Treatment Preferences:  Code Status: Full Code          What is most important right now is to focus on curative/life-prolongation (regardless of treatment burdens).  Accordingly, we have decided that the best plan to meet the patient's goals includes continuing with treatment.      Labs:  N/A    Nursing Precautions:  Fall and Pressure ulcer prevention    Consults:   PT to evaluate and treat- 3 times a week, OT to evaluate and treat- 3 times a week, Wound Care, and Nutrition to evaluate and recommend diet     Miscellaneous Care: PEG Care:  Clean site every 24 hours  Routine Skin for Bedridden Patients:  Apply moisture barrier cream to all  Wound Care: yes:  R groin- 1)clean with Vashe solution and pat dry  2)apply silver hydrofiber (Aquacel Ag) to open area  3)cover with border foam dressing- sacrum 7x8 cm. Perform care MWF-  or change dressing if becomes saturated. If dressing is changed daily- please re consult IP wound care.                 Diabetes Care:  SN to perform and educate Diabetic management with blood glucose monitoring: and Fingerstick blood sugar every 6 hours if patient is unable to eat    Continue to hold free water flushes until serum sodium improves to 135,   at which point can resume 100ml FWF BID    Medications: Discontinue all previous medication orders, if any. See new list below.     Medication List        START taking these medications      aspirin 81 MG Chew  1 tablet (81 mg total) by Per G Tube route once daily.     atorvastatin 40 MG tablet  Commonly known as: LIPITOR  1 tablet (40 mg total) by Per G Tube route once daily.     insulin aspart U-100 100 unit/mL (3 mL) Inpn pen  Commonly known as: NovoLOG  Inject 0-5 Units into the skin every 6 (six) hours as needed (Hyperglycemia). **LOW CORRECTION DOSE**  Blood Glucose  mg/dL                         151-200                0 unit  201-250                2 units  251-300                3 units  301-350                4 units  >350            "          5 units  Administer subcutaneously if needed at times designated by monitoring schedule.   DO NOT HOLD correction dose insulin for patients who are  NPO.  "HIGH ALERT MEDICATION" - Administer with meals or TF/TPN.  Replaces: insulin aspart U-100 100 unit/mL injection     insulin glargine U-100 (Lantus) 100 unit/mL (3 mL) Inpn pen  Inject 13 Units into the skin every evening.     metoprolol tartrate 25 MG tablet  Commonly known as: LOPRESSOR  1 tablet (25 mg total) by Per G Tube route 2 (two) times daily.     polyethylene glycol 17 gram Pwpk  Commonly known as: GLYCOLAX  17 g by Per G Tube route 2 (two) times daily as needed for Constipation.     sodium bicarbonate 650 MG tablet  1 tablet (650 mg total) by Per G Tube route 3 (three) times daily.            CHANGE how you take these medications      pantoprazole 40 mg suspension  Commonly known as: PROTONIX  1 packet (40 mg total) by Per G Tube route once daily.  What changed:   medication strength  how to take this  additional instructions            CONTINUE taking these medications      VITAMIN C 500 MG tablet  Generic drug: ascorbic acid (vitamin C)  500 mg by Per G Tube route 2 (two) times daily.            STOP taking these medications      amLODIPine 10 MG tablet  Commonly known as: NORVASC     carvediloL 25 MG tablet  Commonly known as: COREG     chlorhexidine 0.12 % solution  Commonly known as: PERIDEX     heparin (porcine) 5,000 unit/mL injection     insulin aspart U-100 100 unit/mL injection  Commonly known as: NovoLOG  Replaced by: insulin aspart U-100 100 unit/mL (3 mL) Inpn pen     insulin detemir U-100 100 unit/mL injection  Commonly known as: Levemir     psyllium Powd  Commonly known as: KONSYL     sevelamer carbonate 2.4 gram Pwpk  Commonly known as: RENVELA     zinc sulfate 50 mg zinc (220 mg) capsule  Commonly known as: ZINCATE                Immunizations Administered as of 7/8/2024       No immunizations on file.            This patient " has had both covid vaccinations    Some patients may experience side effects after vaccination.  These may include fever, headache, muscle or joint aches.  Most symptoms resolve with 24-48 hours and do not require urgent medical evaluation unless they persist for more than 72 hours or symptoms are concerning for an unrelated medical condition.          _________________________________  Samra Leavitt MD  07/08/2024

## 2024-06-17 NOTE — SUBJECTIVE & OBJECTIVE
Interval History: NAEON. Will have dialysis today, then will complete IV antibiotics. Stable for DC to nursing home after completion of antibiotics.     Review of Systems  Objective:     Vital Signs (Most Recent):  Temp: 98.5 °F (36.9 °C) (06/17/24 1125)  Pulse: 87 (06/17/24 1138)  Resp: 18 (06/17/24 1125)  BP: 117/80 (06/17/24 1125)  SpO2: 98 % (06/17/24 1125) Vital Signs (24h Range):  Temp:  [97.8 °F (36.6 °C)-98.9 °F (37.2 °C)] 98.5 °F (36.9 °C)  Pulse:  [77-95] 87  Resp:  [18-20] 18  SpO2:  [96 %-99 %] 98 %  BP: (117-145)/(70-81) 117/80     Weight: 105.5 kg (232 lb 9.4 oz)  Body mass index is 36.43 kg/m².    Intake/Output Summary (Last 24 hours) at 6/17/2024 1312  Last data filed at 6/17/2024 0611  Gross per 24 hour   Intake 420 ml   Output --   Net 420 ml         Physical Exam  Vitals and nursing note reviewed.   Constitutional:       General: She is not in acute distress.     Appearance: She is not ill-appearing, toxic-appearing or diaphoretic.   HENT:      Head: Normocephalic and atraumatic.   Eyes:      General: No scleral icterus.        Right eye: No discharge.         Left eye: No discharge.      Conjunctiva/sclera: Conjunctivae normal.   Neck:      Comments: Decannulated  Cardiovascular:      Rate and Rhythm: Normal rate and regular rhythm.      Heart sounds: Normal heart sounds.   Pulmonary:      Effort: Pulmonary effort is normal. No respiratory distress.   Abdominal:      General: Abdomen is flat. There is no distension.      Tenderness: There is no abdominal tenderness.      Comments: PEG in place.      Musculoskeletal:      Right lower leg: No edema.      Left lower leg: No edema.   Skin:     General: Skin is warm and dry.   Neurological:      General: No focal deficit present.      Mental Status: She is alert.      Comments: Nonverbal             Significant Labs: All pertinent labs within the past 24 hours have been reviewed.  CBC:   Recent Labs   Lab 06/16/24  0639 06/17/24  0546   WBC 7.59 7.89    HGB 8.4* 8.7*   HCT 29.1* 29.1*    335     CMP:   Recent Labs   Lab 06/15/24  1654 06/15/24  2789 06/16/24  0639 06/17/24  0546   NA  --   --  132* 131*  131*   K 3.9 3.8  --  4.1   CL  --   --   --  97   CO2  --   --   --  20*   GLU  --   --   --  90   BUN  --   --   --  23*   CREATININE  --   --   --  6.5*   CALCIUM  --   --   --  9.3   ALBUMIN  --   --   --  2.6*   ANIONGAP  --   --   --  14       Significant Imaging: I have reviewed all pertinent imaging results/findings within the past 24 hours.

## 2024-06-17 NOTE — PROGRESS NOTES
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 History was given from patient's daughter. She was undergoing dialysis today and noticed she was lethargic, less alert than usual self. Pt completed dialysis and still not acting herself. EMS was called, fever of a 100.  On chart review, she did have an episode of large volume emesis around 1700.  Per EMS, she had a slightly elevated temp 100.0°F, glucose 300s.     In the ED: UA 2+ leuks, >100 WBC, many bacteria, WBC 17, CT abd/pelvis concerning for cystitis. Given vanc/zosyn    Overview/Hospital Course:  53 JARROD admitted to Hospital Medicine service for urosepsis. Vanc/zosyn initiated, found to have staph epi in all 4 bottles, vanc/ertapenem course completed per ID. Vascular Neurology consulted concerning mental status change with the recommendation to initiate ASA 81 QD and to obtain an MRI to r/o new stroke. Per discussion with vascular neurology, MRI findings appear to be expected changes from prior stroke. Nephrology was consulted for regularly scheduled dialysis. Pulm consulted, patient decannulated 4/30. Failed swallow assessment, continuing tube feeds. Ongoing placement difficulties d/t need for accepting HD facility to have sandee lift with 2 personnel to operate. Once patient gets accepted at [facility], next step will be to work with daughter on snf NH placement. SW onboard working on paperwork for long term Medicaid application. H/c c/b new fever, ID reconsulted, CT chest showing bilateral ground glass  opacities, Vanc/meropenem course to be completed today 6/17. Pending placement.    Interval History: NAEON. Will have dialysis today, then will complete IV antibiotics. Stable for DC to nursing home after completion of antibiotics.     Review of Systems  Objective:     Vital Signs (Most Recent):  Temp: 98.5 °F (36.9 °C) (06/17/24 1125)  Pulse: 87 (06/17/24 1138)  Resp: 18 (06/17/24 1125)  BP: 117/80 (06/17/24 1125)  SpO2: 98 % (06/17/24 1125) Vital Signs (24h Range):  Temp:  [97.8 °F (36.6 °C)-98.9 °F (37.2 °C)] 98.5 °F (36.9 °C)  Pulse:  [77-95] 87  Resp:  [18-20] 18  SpO2:  [96 %-99 %] 98 %  BP: (117-145)/(70-81) 117/80     Weight: 105.5 kg (232 lb 9.4 oz)  Body mass index is 36.43 kg/m².    Intake/Output Summary (Last 24 hours) at 6/17/2024 1312  Last data filed at 6/17/2024 0611  Gross per 24 hour   Intake 420 ml   Output --   Net 420 ml         Physical Exam  Vitals and nursing note reviewed.   Constitutional:       General: She is not in acute distress.     Appearance: She is not ill-appearing, toxic-appearing or diaphoretic.   HENT:      Head: Normocephalic and atraumatic.   Eyes:      General: No scleral icterus.        Right eye: No discharge.         Left eye: No discharge.      Conjunctiva/sclera: Conjunctivae normal.   Neck:      Comments: Decannulated  Cardiovascular:      Rate and Rhythm: Normal rate and regular rhythm.      Heart sounds: Normal heart sounds.   Pulmonary:      Effort: Pulmonary effort is normal. No respiratory distress.   Abdominal:      General: Abdomen is flat. There is no distension.      Tenderness: There is no abdominal tenderness.      Comments: PEG in place.      Musculoskeletal:      Right lower leg: No edema.      Left lower leg: No edema.   Skin:     General: Skin is warm and dry.   Neurological:      General: No focal deficit present.      Mental Status: She is alert.      Comments: Nonverbal             Significant Labs: All pertinent labs within the past 24 hours have been  reviewed.  CBC:   Recent Labs   Lab 06/16/24  0639 06/17/24  0546   WBC 7.59 7.89   HGB 8.4* 8.7*   HCT 29.1* 29.1*    335     CMP:   Recent Labs   Lab 06/15/24  1654 06/15/24  2359 06/16/24  0639 06/17/24  0546   NA  --   --  132* 131*  131*   K 3.9 3.8  --  4.1   CL  --   --   --  97   CO2  --   --   --  20*   GLU  --   --   --  90   BUN  --   --   --  23*   CREATININE  --   --   --  6.5*   CALCIUM  --   --   --  9.3   ALBUMIN  --   --   --  2.6*   ANIONGAP  --   --   --  14       Significant Imaging: I have reviewed all pertinent imaging results/findings within the past 24 hours.    Assessment/Plan:      * Sepsis  RESOLVED: This patient does have evidence of infective focus. My overall impression is sepsis. Source: Skin and Soft Tissue (location Abdominal). Not requiring pressors. Source control achieved by: vanc/meropenem.    - Concerns septic source may be PEG site with associated purulent wound despite Wound Care attempts to protect with barriers. PEG placed by General Surgery 2/2024. General Surgery consulted regarding PEG site ordered CT abdomen. Currently in place.   - Continue meropenem as ID recs for 5 day course; aspiration as possible cause of infection  - End date AB: 6/17/24  - Already completed 5 days vancomycin   - US extremities negative for thrombus   - Stable for dc to NH pending HD today and completion of antibiotics    Vulvovaginal candidiasis  Noted 5/29, given 150mg fluconazole x1      Irritant contact dermatitis due friction or contact with other specified body fluids  Wound care following       Debility  Needs:  Wheelchair: patient has a mobility limitation that significantly impairs her ability to participate in one or more mobility related activities of daily living (MRADL's) such as toileting, feeding, dressing, grooming, and bathing in customary locations in the home.  The mobility limitation cannot be sufficiently resolved by the use of a cane or walker.   The use of a manual  wheelchair will significantly improve the patient's ability to participate in MRADLS and the patient will use it on regular basis in the home.  Family/pt has expressed her willingness to use a manual wheelchair in the home.  She also has a caregiver who is capable of assisting in mobility.    Mrs Saravia requires a hospital bed due to her requiring positioning of the body in ways not feasible with an ordinary bed to alleviate pain/ is completely immobile /or limited mobility and cannot independently make changes in body position without the use of the bed.  The positioning of the body cannot be sufficiently resolved by the use of pillows and wedges    Patient has a mobility limitation that significantly impairs their ability to participate in one or more mobility related activities of daily living, including toileting. This deficit can be resolved by using a bedside commode. Patient demonstrates mobility limitations that will cause them to be confined to one room at home without bathroom access for up to 30 days. Using a bedside commode will greatly improve the patient's ability to participate in MRADLs       Complication of vascular dialysis catheter  Cath intermittently clogging, not resolving with cath-hedy, going for IR venogram 4/30 with possible exchange. S/p exchange with IR on 4/30      Constipation  Resolved     Moderate malnutrition  Nutrition consulted. Most recent weight and BMI monitored-     Measurements:  Wt Readings from Last 1 Encounters:   06/11/24 105.5 kg (232 lb 9.4 oz)   Body mass index is 36.43 kg/m².    Patient has been screened and assessed by RD.    Malnutrition Type:  Context: acute illness or injury  Level: moderate    Malnutrition Characteristic Summary:  Weight Loss (Malnutrition): 10% in 6 months  Subcutaneous Fat (Malnutrition): mild depletion  Muscle Mass (Malnutrition): mild depletion  Fluid Accumulation (Malnutrition): mild    Interventions/Recommendations (treatment strategy):  -  TF  - previous history of failed swallow studies    Prolonged QT interval  Qtc 526 [04/10/24]. Limit Qtc prolonging drugs as able    Acute cystitis with hematuria  Resolved       Spastic hemiplegia of right dominant side as late effect of cerebrovascular disease  Important that patient is able to sit in chair for 4h for dialysis placement needs, even if she requires lift/assistance getting into the chair.This patient has Chronic right hemiplegia due to stroke. Physical therapy services has been scheduled. Continue all standard measures for pressure injury prevention and consult wound care for any wounds (chronic or acute).    ESRD (end stage renal disease) on dialysis  Creatine stable for now. BMP reviewed- noted Estimated Creatinine Clearance: 12.5 mL/min (A) (based on SCr of 6.5 mg/dL (H)). according to latest data. Based on current GFR, CKD stage is end stage.  Monitor UOP and serial BMP and adjust therapy as needed. Renally dose meds. Avoid nephrotoxic medications and procedures.    -- HD MWF (~1L fluid removal on average per session)  -- nephro following  -- Hypophosphatemic on sevelamer. Reached out to nephrology for decrease in phosph binder to improve phosph levels  --SW onboard for placement to [facility] to continue dialysis. Once patient gets accepted at [facility], next step will be to work with daughter on detention NH placement.     PEG (percutaneous endoscopic gastrostomy) status  On PEG tube.Wound care with PEG dressing changes.   - CT abdomen with PEG in correct location  - TF         Status post tracheostomy  Resolved, decannulated 4/30    Leukocytosis  Resolved      Acute encephalopathy  Resolved    Type 2 diabetes mellitus with hyperglycemia, with long-term current use of insulin  Adjusting insulin to account for diabetic TF    Patient's FSGs are controlled on current medication regimen.  Last A1c reviewed-   Lab Results   Component Value Date    HGBA1C 6.2 (H) 05/27/2024     Most recent  fingerstick glucose reviewed-   Recent Labs   Lab 06/16/24  1702 06/16/24  2200 06/17/24  0817   POCTGLUCOSE 105 103 99       Current correctional scale  Medium  Maintain anti-hyperglycemic dose as follows-   Antihyperglycemics (From admission, onward)      Start     Stop Route Frequency Ordered    06/09/24 2100  insulin glargine U-100 (Lantus) pen 13 Units         -- SubQ Nightly 06/09/24 0756    06/09/24 1255  insulin aspart U-100 pen 0-10 Units         -- SubQ Before meals & nightly PRN 06/09/24 1155          Hold Oral hypoglycemics while patient is in the hospital.  POCT glucose checks   Continue tube feeds    Hyponatremia  Patient has hyponatremia which is uncontrolled,We will aim to correct the sodium by 4-6mEq in 24 hours. We will monitor sodium Daily. The hyponatremia is due to ESRD. Discussed with nephrology, dialysate bath adjusted to account for and correct hyponatremia.  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 Pt underwent extensive course, currently still healing from this, but no longer has wound vac. Pt's wound currently covered with bandage. Picture in media tabs    - Wound care while inpatient  - NH with wound care orders        VTE Risk Mitigation (From admission, onward)           Ordered     heparin (porcine) injection 1,000 Units  As needed (PRN)         06/10/24 0035     heparin (porcine) injection 5,000 Units  Every 8 hours         05/29/24 0823     heparin (porcine) injection 3,000 Units  As needed (PRN)         04/17/24 0825                    Discharge Planning   ZEKE: 6/17/2024     Code Status: Full Code   Is the patient medically ready for discharge?: No    Reason for patient still in hospital (select all that apply): Pending disposition  Discharge Plan A: New Nursing Home placement - long term care facility ([facility])          Jessie Oh MD  Department of Hospital Medicine    [facility] - Telemetry Stepdown

## 2024-06-17 NOTE — PLAN OF CARE
Problem: Infection  Goal: Absence of Infection Signs and Symptoms  Outcome: Progressing  Intervention: Prevent or Manage Infection  Flowsheets (Taken 6/17/2024 0306)  Fever Reduction/Comfort Measures:   lightweight clothing   lightweight bedding  Infection Management: aseptic technique maintained  Isolation Precautions: precautions maintained       atient awake, alert, pt is non-verbal .  Pain assessed using FLACC scale.  Pt does not exhibit any signs of pain.  Antibiotic administered as ordered.  pt educated on safety, and medication use..  Plan of care discussed with patient,  call light within reach. Bed alarm on.  Bed  low and locked.

## 2024-06-17 NOTE — PLAN OF CARE
Call received from Yadi at [facility] ([phone]) and she stated that they can accept today after she receives dialysis. NH orders, 142, and PASRR sent via fax. CM confirmed that patient is stable for discharge today. Will continue to follow.    13:30PM  Message received from Yadi that they will require a new PASRR be completed as Ms. Saravia's was completed on 5/2/24. LOCET called in.     Sara Loredo RN  Ext [phone]

## 2024-06-17 NOTE — ASSESSMENT & PLAN NOTE
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 Pt underwent extensive course, currently still healing from this, but no longer has wound vac. Pt's wound currently covered with bandage. Picture in media tabs    - Wound care while inpatient  - NH with wound care orders

## 2024-06-18 LAB
ALBUMIN SERPL BCP-MCNC: 2.7 G/DL (ref 3.5–5.2)
ALP SERPL-CCNC: 74 U/L (ref 55–135)
ALT SERPL W/O P-5'-P-CCNC: 24 U/L (ref 10–44)
ANION GAP SERPL CALC-SCNC: 14 MMOL/L (ref 8–16)
AST SERPL-CCNC: 24 U/L (ref 10–40)
BACTERIA BLD CULT: NORMAL
BACTERIA BLD CULT: NORMAL
BASOPHILS # BLD AUTO: 0.07 K/UL (ref 0–0.2)
BASOPHILS NFR BLD: 0.3 % (ref 0–1.9)
BILIRUB SERPL-MCNC: 0.4 MG/DL (ref 0.1–1)
BUN SERPL-MCNC: 12 MG/DL (ref 6–20)
CALCIUM SERPL-MCNC: 9.4 MG/DL (ref 8.7–10.5)
CHLORIDE SERPL-SCNC: 98 MMOL/L (ref 95–110)
CO2 SERPL-SCNC: 21 MMOL/L (ref 23–29)
CREAT SERPL-MCNC: 4.4 MG/DL (ref 0.5–1.4)
DIFFERENTIAL METHOD BLD: ABNORMAL
EOSINOPHIL # BLD AUTO: 0.1 K/UL (ref 0–0.5)
EOSINOPHIL NFR BLD: 0.6 % (ref 0–8)
ERYTHROCYTE [DISTWIDTH] IN BLOOD BY AUTOMATED COUNT: 16.5 % (ref 11.5–14.5)
EST. GFR  (NO RACE VARIABLE): 11.4 ML/MIN/1.73 M^2
GLUCOSE SERPL-MCNC: 121 MG/DL (ref 70–110)
HCT VFR BLD AUTO: 31.2 % (ref 37–48.5)
HGB BLD-MCNC: 9.3 G/DL (ref 12–16)
IMM GRANULOCYTES # BLD AUTO: 0.2 K/UL (ref 0–0.04)
IMM GRANULOCYTES NFR BLD AUTO: 0.8 % (ref 0–0.5)
LYMPHOCYTES # BLD AUTO: 2.5 K/UL (ref 1–4.8)
LYMPHOCYTES NFR BLD: 10.6 % (ref 18–48)
MAGNESIUM SERPL-MCNC: 2 MG/DL (ref 1.6–2.6)
MCH RBC QN AUTO: 24.7 PG (ref 27–31)
MCHC RBC AUTO-ENTMCNC: 29.8 G/DL (ref 32–36)
MCV RBC AUTO: 83 FL (ref 82–98)
MONOCYTES # BLD AUTO: 1.4 K/UL (ref 0.3–1)
MONOCYTES NFR BLD: 5.8 % (ref 4–15)
NEUTROPHILS # BLD AUTO: 19.5 K/UL (ref 1.8–7.7)
NEUTROPHILS NFR BLD: 81.9 % (ref 38–73)
NRBC BLD-RTO: 0 /100 WBC
PHOSPHATE SERPL-MCNC: 2.4 MG/DL (ref 2.7–4.5)
PLATELET # BLD AUTO: 411 K/UL (ref 150–450)
PMV BLD AUTO: 9.9 FL (ref 9.2–12.9)
POCT GLUCOSE: 129 MG/DL (ref 70–110)
POCT GLUCOSE: 148 MG/DL (ref 70–110)
POCT GLUCOSE: 163 MG/DL (ref 70–110)
POCT GLUCOSE: 85 MG/DL (ref 70–110)
POTASSIUM SERPL-SCNC: 4.5 MMOL/L (ref 3.5–5.1)
PROT SERPL-MCNC: 7.4 G/DL (ref 6–8.4)
RBC # BLD AUTO: 3.76 M/UL (ref 4–5.4)
SODIUM SERPL-SCNC: 133 MMOL/L (ref 136–145)
T4 FREE SERPL-MCNC: 1.16 NG/DL (ref 0.71–1.51)
TSH SERPL DL<=0.005 MIU/L-ACNC: 1.77 UIU/ML (ref 0.4–4)
VANCOMYCIN SERPL-MCNC: 14.8 UG/ML
WBC # BLD AUTO: 23.87 K/UL (ref 3.9–12.7)

## 2024-06-18 PROCEDURE — 63600175 PHARM REV CODE 636 W HCPCS: Performed by: FAMILY MEDICINE

## 2024-06-18 PROCEDURE — 80053 COMPREHEN METABOLIC PANEL: CPT

## 2024-06-18 PROCEDURE — 25000003 PHARM REV CODE 250

## 2024-06-18 PROCEDURE — 92526 ORAL FUNCTION THERAPY: CPT

## 2024-06-18 PROCEDURE — 99499 UNLISTED E&M SERVICE: CPT | Mod: ,,, | Performed by: PHYSICIAN ASSISTANT

## 2024-06-18 PROCEDURE — 84443 ASSAY THYROID STIM HORMONE: CPT

## 2024-06-18 PROCEDURE — 84439 ASSAY OF FREE THYROXINE: CPT

## 2024-06-18 PROCEDURE — 87040 BLOOD CULTURE FOR BACTERIA: CPT | Mod: 59

## 2024-06-18 PROCEDURE — 80202 ASSAY OF VANCOMYCIN: CPT

## 2024-06-18 PROCEDURE — 27000207 HC ISOLATION

## 2024-06-18 PROCEDURE — 25500020 PHARM REV CODE 255: Performed by: FAMILY MEDICINE

## 2024-06-18 PROCEDURE — 99233 SBSQ HOSP IP/OBS HIGH 50: CPT | Mod: ,,, | Performed by: PHYSICIAN ASSISTANT

## 2024-06-18 PROCEDURE — 85025 COMPLETE CBC W/AUTO DIFF WBC: CPT

## 2024-06-18 PROCEDURE — 84100 ASSAY OF PHOSPHORUS: CPT

## 2024-06-18 PROCEDURE — 99232 SBSQ HOSP IP/OBS MODERATE 35: CPT | Mod: ,,, | Performed by: INTERNAL MEDICINE

## 2024-06-18 PROCEDURE — 25000003 PHARM REV CODE 250: Performed by: STUDENT IN AN ORGANIZED HEALTH CARE EDUCATION/TRAINING PROGRAM

## 2024-06-18 PROCEDURE — 36415 COLL VENOUS BLD VENIPUNCTURE: CPT

## 2024-06-18 PROCEDURE — 20600001 HC STEP DOWN PRIVATE ROOM

## 2024-06-18 PROCEDURE — 63600175 PHARM REV CODE 636 W HCPCS

## 2024-06-18 PROCEDURE — 83735 ASSAY OF MAGNESIUM: CPT

## 2024-06-18 PROCEDURE — 25000003 PHARM REV CODE 250: Performed by: NURSE PRACTITIONER

## 2024-06-18 PROCEDURE — 25000003 PHARM REV CODE 250: Performed by: FAMILY MEDICINE

## 2024-06-18 RX ORDER — SODIUM CHLORIDE 9 MG/ML
INJECTION, SOLUTION INTRAVENOUS
Status: CANCELLED | OUTPATIENT
Start: 2024-06-18

## 2024-06-18 RX ORDER — SODIUM CHLORIDE 9 MG/ML
INJECTION, SOLUTION INTRAVENOUS ONCE
Status: CANCELLED | OUTPATIENT
Start: 2024-06-18 | End: 2024-06-18

## 2024-06-18 RX ORDER — MEROPENEM AND SODIUM CHLORIDE 500 MG/50ML
500 INJECTION, SOLUTION INTRAVENOUS
Status: DISCONTINUED | OUTPATIENT
Start: 2024-06-18 | End: 2024-06-18

## 2024-06-18 RX ADMIN — IOHEXOL 100 ML: 350 INJECTION, SOLUTION INTRAVENOUS at 05:06

## 2024-06-18 RX ADMIN — HEPARIN SODIUM 5000 UNITS: 5000 INJECTION INTRAVENOUS; SUBCUTANEOUS at 09:06

## 2024-06-18 RX ADMIN — INSULIN GLARGINE 13 UNITS: 100 INJECTION, SOLUTION SUBCUTANEOUS at 09:06

## 2024-06-18 RX ADMIN — ASPIRIN 81 MG CHEWABLE TABLET 81 MG: 81 TABLET CHEWABLE at 09:06

## 2024-06-18 RX ADMIN — ACETAMINOPHEN 650 MG: 650 SOLUTION ORAL at 05:06

## 2024-06-18 RX ADMIN — POLYETHYLENE GLYCOL 3350 17 G: 17 POWDER, FOR SOLUTION ORAL at 09:06

## 2024-06-18 RX ADMIN — SEVELAMER CARBONATE 0.8 G: 800 POWDER, FOR SUSPENSION ORAL at 12:06

## 2024-06-18 RX ADMIN — METOPROLOL TARTRATE 25 MG: 25 TABLET, FILM COATED ORAL at 09:06

## 2024-06-18 RX ADMIN — ACETAMINOPHEN 650 MG: 650 SOLUTION ORAL at 09:06

## 2024-06-18 RX ADMIN — SEVELAMER CARBONATE 0.8 G: 800 POWDER, FOR SUSPENSION ORAL at 04:06

## 2024-06-18 RX ADMIN — MEROPENEM 500 MG: 500 INJECTION INTRAVENOUS at 10:06

## 2024-06-18 RX ADMIN — PANTOPRAZOLE SODIUM 40 MG: 40 GRANULE, DELAYED RELEASE ORAL at 09:06

## 2024-06-18 RX ADMIN — SEVELAMER CARBONATE 0.8 G: 800 POWDER, FOR SUSPENSION ORAL at 07:06

## 2024-06-18 RX ADMIN — Medication 500 MG: at 09:06

## 2024-06-18 RX ADMIN — HEPARIN SODIUM 5000 UNITS: 5000 INJECTION INTRAVENOUS; SUBCUTANEOUS at 02:06

## 2024-06-18 RX ADMIN — VANCOMYCIN HYDROCHLORIDE 500 MG: 500 INJECTION, POWDER, LYOPHILIZED, FOR SOLUTION INTRAVENOUS at 12:06

## 2024-06-18 RX ADMIN — ATORVASTATIN CALCIUM 40 MG: 40 TABLET, FILM COATED ORAL at 09:06

## 2024-06-18 RX ADMIN — MEROPENEM 500 MG: 500 INJECTION INTRAVENOUS at 11:06

## 2024-06-18 RX ADMIN — HEPARIN SODIUM 5000 UNITS: 5000 INJECTION INTRAVENOUS; SUBCUTANEOUS at 05:06

## 2024-06-18 NOTE — PROGRESS NOTES
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 Hospitalization was c/b urosepsis, in which pt completed carbapenem course on 4/16. Trach was capped and later pt was decannulated on 4/30 which was successful. Pt remained inpatient while awaiting dispo plans, as well as requiring mgmt for lyte imbalances and dysphagia.   Pt was restarted on antibiotics on 6/10 per primary team as pt began to be tachycardic, slight leukocytosis, and low grade fever. Blood cx negative. RIP negative.  CT CAP w/o contrast was overall unremarkable. Continued with increasing white count and fever and elevated procalcitonin.  Escalated to vanc/meropenem for possible aspiration pneumonia.     Patient completed a course of vancomycin and meropenem with resolution of fevers and leukocytosis (last meropenem dose 6/16). Began having fevers and a leukocytosis again this morning prompting ID consult.       Recommendations:   - Agree with resuming Vancomycin and Meropenem  - Recommend CT C/A/P with contrast to assess for nidus of infection  - UA/ blood cultures/procalcitonin ordered. Spoke with patient's nurse and denies diarrhea.   - Primary team to assess sacrum today and inform ID with any skin changes  - Discussed plan with ID staff and hospital medicine team. ID will follow.         Thank you for the consult. Please secure chat for any questions.  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 Reportedly patient was less responsive than her baseline had an episode of emesis, in the ED she had a CT abdomen/pelvis concerning for cystitis as well as a UA (obtained via straight cath) concerning for a UTI. She was initially started on vancomycin and Zosyn later broadened to meropenem. On presentation her labs were notable for leukocytosis of 17, as well as a lactic acidosis of 3.9 that has improved to 2.4 with the administration of 1 L of IV fluids, we are being cautious with fluid repletion given her end-stage renal disease and oliguric status. ID is now consulted for abrupt increase in WBC to 22 w/ associated fever. Daughter at bedside notes that patient had a few episodes of vomiting 2 days ago after initiation of tube feeds, and feels her breathing pattern is different today. Patient is unable to answer questions. CXR  w/ increased vascular congestion. She has been restarted on broad spectrum abx.   Interval hx:  ID re-consulted due to fevers and a leukocytosis.   Blood cx NGTD  CT C/A/P ordered   Patient alert and following few directions. Appears comfortable in no distress.      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    Hypermagnesemia 04/11/2024    POA, Mg 3.2  Daily chem       Morbid obesity     Necrotizing fasciitis        Past Surgical History:   Procedure Laterality Date    CLOSURE OF WOUND Right 2/22/2024    Procedure: CLOSURE, WOUND;  Surgeon: Steve Cole MD;  Location: [facility] OR [address] [place] [address];  Service: General;  Laterality: Right;    ESOPHAGOGASTRODUODENOSCOPY W/ PEG N/A 2/22/2024    Procedure: EGD, WITH PEG TUBE INSERTION;  Surgeon: Steve Cole MD;  Location: [facility] OR [address] [place] [address];  Service: General;   Laterality: N/A;    INCISION AND DRAINAGE OF PERIRECTAL REGION N/A 1/29/2024    Procedure: INCISION AND DRAINAGE, PERIRECTAL REGION;  Surgeon: Axel Ramsay MD;  Location: [facility] OR;  Service: General;  Laterality: N/A;    LUMBAR PUNCTURE N/A 2/16/2024    Procedure: Lumbar Puncture;  Surgeon: Macy Boykin;  Location: [facility];  Service: Anesthesiology;  Laterality: N/A;    PLACEMENT, TRIALYSIS CATH Right 1/31/2024    Procedure: INSERTION, CATHETER, TRIPLE LUMEN, HEMODIALYSIS, TEMPORARY;  Surgeon: Alvin Junior MD;  Location: [facility] OR;  Service: General;  Laterality: Right;    REPLACEMENT OF WOUND VACUUM-ASSISTED CLOSURE DEVICE Right 2/12/2024    Procedure: REPLACEMENT, WOUND VAC;  Surgeon: Mundo Carmona MD;  [facility];  Service: General;  Laterality: Right;    REPLACEMENT OF WOUND VACUUM-ASSISTED CLOSURE DEVICE N/A 2/15/2024    Procedure: REPLACEMENT, WOUND VAC;  Surgeon: Steve Cole MD;  [facility];  Service: General;  Laterality: N/A;    REPLACEMENT OF WOUND VACUUM-ASSISTED CLOSURE DEVICE Right 2/19/2024    Procedure: REPLACEMENT, WOUND VAC;  Surgeon: Steve Cole MD;  [facility];  Service: General;  Laterality: Right;  RLE/groin    REPLACEMENT OF WOUND VACUUM-ASSISTED CLOSURE DEVICE Right 2/22/2024    Procedure: REPLACEMENT, WOUND VAC;  Surgeon: Steve Cole MD;  [facility];  Service: General;  Laterality: Right;    TRACHEOSTOMY N/A 2/22/2024    Procedure: CREATION, TRACHEOSTOMY;  Surgeon: Steve Cole MD;  [facility];  Service: General;  Laterality: N/A;    WOUND DEBRIDEMENT Bilateral 2/2/2024    Procedure: DEBRIDEMENT, WOUND;  Surgeon: Steve Cole MD;  [facility];  Service: General;  Laterality: Bilateral;  Bilateral groin  Possible wound vac placement    WOUND DEBRIDEMENT Right 2/6/2024    Procedure: DEBRIDEMENT, WOUND, replace wound vac, possible closure;  Surgeon: Steve Cole MD;  Location:  [facility] OR 2ND FLR;  Service: General;  Laterality: Right;  RLE    WOUND DEBRIDEMENT Right 2/9/2024    Procedure: DEBRIDEMENT, WOUND w wound vac change;  Surgeon: Steve Cole MD;  Location: [facility] OR 2ND FLR;  Service: General;  Laterality: Right;  RLE    WOUND DEBRIDEMENT Right 2/12/2024    Procedure: R thigh wound debridement;  Surgeon: Mundo Carmona MD;  Location: [facility] OR 2ND FLR;  Service: General;  Laterality: Right;    WOUND EXPLORATION Right 1/31/2024    Procedure: IRRIGATION & DEBRIDEMENT, WOUND DEBRIDEMENT;  Surgeon: Alvin Junior MD;  Location: [facility] OR;  Service: General;  Laterality: Right;       Review of patient's allergies indicates:  No Known Allergies    Medications:  Medications Prior to Admission   Medication Sig    ascorbic acid, vitamin C, (VITAMIN C) 500 MG tablet 500 mg by Per G Tube route 2 (two) times daily.    pantoprazole sodium (PANTOPRAZOLE ORAL) 40 mg once daily. Liquid via gtube    [DISCONTINUED] amLODIPine (NORVASC) 10 MG tablet 10 mg by Per G Tube route once daily. 0900    [DISCONTINUED] carvediloL (COREG) 25 MG tablet 25 mg by Per G Tube route 2 (two) times daily with meals.    [DISCONTINUED] chlorhexidine (PERIDEX) 0.12 % solution Use as directed 15 mLs in the mouth or throat 2 (two) times daily.    [DISCONTINUED] heparin sodium,porcine (HEPARIN, PORCINE,) 5,000 unit/mL injection Inject 7,500 Units into the skin every 8 (eight) hours.    [DISCONTINUED] insulin aspart U-100 (NOVOLOG) 100 unit/mL injection Inject 0-10 Units into the skin as needed for High Blood Sugar. Moderate correction dose sliding scale    [DISCONTINUED] insulin detemir U-100 (LEVEMIR) 100 unit/mL injection Inject 25 Units into the skin 2 (two) times a day.    [DISCONTINUED] psyllium (KONSYL) Powd 1 packet by Per G Tube route once daily.    [DISCONTINUED] sevelamer carbonate (RENVELA) 2.4 gram PwPk 2,400 mg by Per G Tube route 3 (three) times daily.    [DISCONTINUED] zinc sulfate (ZINCATE) 50 mg zinc (220  "mg) capsule 220 mg by Per G Tube route once daily.     Antibiotics (From admission, onward)      Start     Stop Route Frequency Ordered    06/18/24 1100  meropenem (MERREM) 500 mg in sodium chloride 0.9 % 100 mL IVPB (MB+)         -- IV Every 12 hours (non-standard times) 06/18/24 0947    06/18/24 1041  vancomycin - pharmacy to dose  (vancomycin IVPB (PEDS and ADULTS))        Placed in "And" Linked Group    -- IV pharmacy to manage frequency 06/18/24 0941          Antifungals (From admission, onward)      None          Antivirals (From admission, onward)      None             Immunization History   Administered Date(s) Administered    PPD Test 08/16/2022, 04/23/2024, 05/16/2024, 05/29/2024, 06/17/2024    Td (ADULT) 11/16/2005       Family History    None       Social History     Socioeconomic History    Marital status: Single   Tobacco Use    Smoking status: Unknown     Social Determinants of Health     Financial Resource Strain: Patient Unable To Answer (3/14/2024)    Overall Financial Resource Strain (CARDIA)     Difficulty of Paying Living Expenses: Patient unable to answer   Recent Concern: Financial Resource Strain - High Risk (3/9/2024)    Overall Financial Resource Strain (CARDIA)     Difficulty of Paying Living Expenses: Very hard   Food Insecurity: Patient Unable To Answer (3/14/2024)    Hunger Vital Sign     Worried About Running Out of Food in the Last Year: Patient unable to answer     Ran Out of Food in the Last Year: Patient unable to answer   Transportation Needs: Patient Unable To Answer (3/14/2024)    PRAPARE - Transportation     Lack of Transportation (Medical): Patient unable to answer     Lack of Transportation (Non-Medical): Patient unable to answer   Physical Activity: Inactive (3/13/2024)    Exercise Vital Sign     Days of Exercise per Week: 0 days     Minutes of Exercise per Session: 0 min   Stress: Patient Unable To Answer (3/14/2024)    [place] [place] [employer] - " Occupational Stress Questionnaire     Feeling of Stress : Patient unable to answer   Housing Stability: Patient Unable To Answer (3/14/2024)    Housing Stability Vital Sign     Unable to Pay for Housing in the Last Year: Patient unable to answer     Number of Places Lived in the Last Year: 1     Unstable Housing in the Last Year: Patient unable to answer   Recent Concern: Housing Stability - High Risk (3/9/2024)    Housing Stability Vital Sign     Unable to Pay for Housing in the Last Year: Yes     Unstable Housing in the Last Year: No     Review of Systems   Unable to perform ROS: Patient nonverbal     Objective:     Vital Signs (Most Recent):  Temp: 99.4 °F (37.4 °C) (06/18/24 1127)  Pulse: 96 (06/18/24 1448)  Resp: 18 (06/18/24 1127)  BP: 129/82 (06/18/24 1127)  SpO2: 98 % (06/18/24 1127) Vital Signs (24h Range):  Temp:  [98.2 °F (36.8 °C)-101.2 °F (38.4 °C)] 99.4 °F (37.4 °C)  Pulse:  [] 96  Resp:  [16-19] 18  SpO2:  [98 %-100 %] 98 %  BP: (101-146)/(69-94) 129/82     Weight: 105.5 kg (232 lb 9.4 oz)  Body mass index is 36.43 kg/m².    Estimated Creatinine Clearance: 18.5 mL/min (A) (based on SCr of 4.4 mg/dL (H)).     Physical Exam  Nursing note reviewed.   Constitutional:       General: She is not in acute distress.     Appearance: She is obese. She is not toxic-appearing.   HENT:      Head: Normocephalic and atraumatic.   Eyes:      General: No scleral icterus.     Conjunctiva/sclera: Conjunctivae normal.   Cardiovascular:      Rate and Rhythm: Regular rhythm. Tachycardia present.   Pulmonary:      Effort: Pulmonary effort is normal. No respiratory distress.   Abdominal:      General: There is no distension.      Palpations: Abdomen is soft.      Comments: G tube   Skin:     General: Skin is warm and dry.      Comments: R chest HD line w/o evidence of infection    Neurological:      Mental Status: She is alert.      Comments: Awake and alert. Following few commands. Non verbal          Significant Labs:  CBC:   Recent Labs   Lab 06/17/24  0546 06/18/24  0806   WBC 7.89 23.87*   HGB 8.7* 9.3*   HCT 29.1* 31.2*    411     CMP:   Recent Labs   Lab 06/17/24  0546 06/18/24  0806   *  131* 133*   K 4.1 4.5   CL 97 98   CO2 20* 21*   GLU 90 121*   BUN 23* 12   CREATININE 6.5* 4.4*   CALCIUM 9.3 9.4   PROT  --  7.4   ALBUMIN 2.6* 2.7*   BILITOT  --  0.4   ALKPHOS  --  74   AST  --  24   ALT  --  24   ANIONGAP 14 14     Microbiology Results (last 7 days)       Procedure Component Value Units Date/Time    Blood culture [[id number]] Collected: 06/18/24 0807    Order Status: Sent Specimen: Blood from Peripheral, Hand, Left Updated: 06/18/24 0844    Blood culture [[id number]] Collected: 06/18/24 0806    Order Status: Sent Specimen: Blood from Peripheral, Antecubital, Right Updated: 06/18/24 0844    Blood culture [[id number]] Collected: 06/13/24 0622    Order Status: Completed Specimen: Blood Updated: 06/18/24 0812     Blood Culture, Routine No growth after 5 days.    Blood culture [[id number]] Collected: 06/13/24 0622    Order Status: Completed Specimen: Blood Updated: 06/18/24 0812     Blood Culture, Routine No growth after 5 days.    Blood culture [[id number]] Collected: 06/10/24 0719    Order Status: Completed Specimen: Blood Updated: 06/15/24 1012     Blood Culture, Routine No growth after 5 days.    Narrative:      Lft forearm    Blood culture [[id number]] Collected: 06/10/24 0719    Order Status: Completed Specimen: Blood Updated: 06/15/24 1012     Blood Culture, Routine No growth after 5 days.    Narrative:      Lft hand    Respiratory Infection Panel (PCR), Nasopharyngeal [[id number]] Collected: 06/14/24 0922    Order Status: Completed Specimen: Nasopharyngeal Swab Updated: 06/14/24 1117     Respiratory Infection Panel Source NP Swab     Adenovirus Not Detected     Coronavirus 229E, Common Cold Virus Not Detected     Coronavirus HKU1, Common Cold Virus Not Detected     Coronavirus NL63, Common Cold Virus  Not Detected     Coronavirus OC43, Common Cold Virus Not Detected     Comment: The Coronavirus strains detected in this test cause the common cold.  These strains are not the COVID-19 (novel Coronavirus)strain   associated with the respiratory disease outbreak.          SARS-CoV2 (COVID-19) Qualitative PCR Not Detected     Human Metapneumovirus Not Detected     Human Rhinovirus/Enterovirus Not Detected     Influenza A (subtypes H1, H1-2009,H3) Not Detected     Influenza B Not Detected     Parainfluenza Virus 1 Not Detected     Parainfluenza Virus 2 Not Detected     Parainfluenza Virus 3 Not Detected     Parainfluenza Virus 4 Not Detected     Respiratory Syncytial Virus Not Detected     Bordetella Parapertussis (QL9587) Not Detected     Bordetella pertussis (ptxP) Not Detected     Chlamydia pneumoniae Not Detected     Mycoplasma pneumoniae Not Detected    Narrative:      Assay not valid for lower respiratory specimens, alternate  testing required.    Respiratory Infection Panel (PCR), Nasopharyngeal [[id number]]     Order Status: Canceled Specimen: Nasopharyngeal Swab             Significant Imaging: I have reviewed all pertinent imaging results/findings within the past 24 hours.

## 2024-06-18 NOTE — PROGRESS NOTES
[facility] - Telemetry Stepdown  Nephrology  Progress Note    Patient Name: Sarah Saravia  MRN: [id number]  Admission Date: 4/10/2024  Hospital Length of Stay: 69 days  Attending Provider: Virgil Fierro III*   Primary Care Physician: Deanna, Primary Doctor  Principal Problem:Sepsis    Subjective:     HPI: The patient is a 53 y.o. 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 The patient is unable to provide adequate history. Additional history and patient information was obtained from patient's daughter, past medical records and ER records. Per chart, she was undergoing dialysis Wednesday and was noted to be more lethargic than usual despite completing her HD session. EMS was called, fever of a 100. In the ED, UA 2+ leuks, >100 WBC, many bacteria, WBC 17, CT abd/pelvis concerning for cystitis. Given vanc/zosyn and admitted.     Of note, the patient was initiated on RRT in February 2024 after being admitted with ALVARADO 2/2 iATN due to septic shock. Perm cath placed on 2/29/24. She was transferred to LT on 3/12. Nephrology consulted for management of ESRD and HD treatment.      Interval History: Tanner fever    Review of patient's allergies indicates:  No Known Allergies  Current Facility-Administered Medications   Medication Frequency    acetaminophen oral solution 650 mg Q6H PRN    ascorbic acid (vitamin C) tablet 500 mg BID    aspirin chewable tablet 81 mg Daily    atorvastatin tablet 40 mg Daily    dextrose 10% bolus 125 mL 125 mL PRN    dextrose 10% bolus 250 mL 250 mL PRN    epoetin merry injection 6,100 Units Every Mon, Wed, Fri    glucagon (human recombinant) injection 1 mg PRN    glucose chewable tablet 16 g PRN    glucose chewable tablet 24 g PRN    heparin (porcine) injection 3,000 Units PRN    heparin (porcine) injection 5,000 Units Q8H     hepatitis B virus vacc.rec(PF) injection 20 mcg vaccine x 1 dose    insulin aspart U-100 pen 0-10 Units QID (AC + HS) PRN    insulin glargine U-100 (Lantus) pen 13 Units QHS    meropenem (MERREM) 500 mg in sodium chloride 0.9 % 100 mL IVPB (MB+) Q12H    metoprolol tartrate (LOPRESSOR) tablet 25 mg BID    ondansetron 4 mg/5 mL solution 4 mg Q6H PRN    oxyCODONE 5 mg/5 mL solution 5 mg Q4H PRN    pantoprazole suspension 40 mg Daily    polyethylene glycol packet 17 g Daily    sevelamer carbonate pwpk 0.8 g TID WM    sodium chloride 0.9% bolus 250 mL 250 mL PRN    sodium chloride 0.9% flush 10 mL Q12H PRN    vancomycin - pharmacy to dose pharmacy to manage frequency       Objective:     Vital Signs (Most Recent):  Temp: 98.9 °F (37.2 °C) (06/18/24 1544)  Pulse: 96 (06/18/24 1544)  Resp: 18 (06/18/24 1544)  BP: 131/82 (06/18/24 1544)  SpO2: 98 % (06/18/24 1544) Vital Signs (24h Range):  Temp:  [98.2 °F (36.8 °C)-101.2 °F (38.4 °C)] 98.9 °F (37.2 °C)  Pulse:  [] 96  Resp:  [16-19] 18  SpO2:  [98 %-100 %] 98 %  BP: (101-133)/(69-93) 131/82     Weight: 105.5 kg (232 lb 9.4 oz) (06/11/24 1330)  Body mass index is 36.43 kg/m².  Body surface area is 2.23 meters squared.    I/O last 3 completed shifts:  In: 1035.1 [I.V.:315.1; Other:300; NG/GT:420]  Out: 2650 [Other:2650]     Physical Exam  Cardiovascular:      Rate and Rhythm: Normal rate.   Pulmonary:      Effort: Pulmonary effort is normal.   Neurological:      Mental Status: Mental status is at baseline.          Significant Labs:  All labs within the past 24 hours have been reviewed.     Significant Imaging:  Labs: Reviewed  o  Assessment/Plan:     Renal/  ESRD (end stage renal disease) on dialysis  53 y.o. Black or  Female ESRD-HD M[facility]W-F at [facility] presents to ED on 4/10/2024 with UTI.    Of note, the patient was initiated on RRT in February 2024 after being admitted with ALVARADO 2/2 iATN due to septic shock. Perm cath placed on 2/29/24. She was  transferred to LTAC on 3/12. Deemed ESRD at LT.  Nephrology consulted for inpatient ESRD-HD management    Assessment:   - HD tomorrow.  - Continue to monitor intake and output  - Please avoid gadolinium, fleets, phos-based laxatives, NSAIDs  - Dialysis thrice weekly unless more urgent indications arise. Will evaluate RRT requirements Daily.      Lab Results   Component Value Date    FESATURATED 10 (L) 03/15/2024    FERRITIN 414 (H) 03/15/2024       - Goal in ESRD is Hgb of 10-11. Continue EPO.       Secondary hyperparathyroid of renal origin   - phos 4.2 - no indication for phos binders           Thank you for your consult. I will follow-up with patient. Please contact us if you have any additional questions.    Jovanny Rodriguez MD  Nephrology  [facility] - Telemetry Stepdown

## 2024-06-18 NOTE — PT/OT/SLP PROGRESS
"Physical Therapy      Patient Name:  Sarah Saravia   MRN:  [id number]    3:30 pm  Patient not seen today secondary to Other (Comment) (Pt's nurse reports "she's about to go for a CT scan".  PTA unable to make 2nd attempt). Will follow-up on next scheduled visit.    "

## 2024-06-18 NOTE — PROGRESS NOTES
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 History was given from patient's daughter. She was undergoing dialysis today and noticed she was lethargic, less alert than usual self. Pt completed dialysis and still not acting herself. EMS was called, fever of a 100.  On chart review, she did have an episode of large volume emesis around 1700.  Per EMS, she had a slightly elevated temp 100.0°F, glucose 300s.     In the ED: UA 2+ leuks, >100 WBC, many bacteria, WBC 17, CT abd/pelvis concerning for cystitis. Given vanc/zosyn    Overview/Hospital Course:  53 JARROD admitted to Hospital Medicine service for urosepsis. Vanc/zosyn initiated, found to have staph epi in all 4 bottles, vanc/ertapenem course completed per ID. Vascular Neurology consulted concerning mental status change with the recommendation to initiate ASA 81 QD and to obtain an MRI to r/o new stroke. Per discussion with vascular neurology, MRI findings appear to be expected changes from prior stroke. Nephrology was consulted for regularly scheduled dialysis. Pulm consulted, patient decannulated 4/30. Failed swallow assessment, continuing tube feeds. Ongoing placement difficulties d/t need for accepting HD facility to have sandee lift with 2 personnel to operate. Once patient gets accepted at [facility], next step will be to work with daughter on care home NH placement. SW onboard working on paperwork for long term Medicaid application. H/c c/b new fever, ID reconsulted, CT chest showing bilateral ground glass  opacities, Vanc/meropenem course to be completed 6/17, however patient had further episode of fever and will need continued merem and ID consulted for assistance. CT pan scan to assess for infection.    Interval History: Patient had episode of fever to 101.2. Discussing with ID, re-scanning patient to assess for possible infectious focus and continuuing abx. Increasing TF and FWF    Review of Systems   Unable to perform ROS: Patient nonverbal     Objective:     Vital Signs (Most Recent):  Temp: 99.9 °F (37.7 °C) (06/18/24 0729)  Pulse: 98 (06/18/24 1104)  Resp: 18 (06/18/24 0729)  BP: 127/82 (06/18/24 0843)  SpO2: 98 % (06/18/24 0729) Vital Signs (24h Range):  Temp:  [98.2 °F (36.8 °C)-101.2 °F (38.4 °C)] 99.9 °F (37.7 °C)  Pulse:  [] 98  Resp:  [16-19] 18  SpO2:  [98 %-100 %] 98 %  BP: (101-146)/(69-94) 127/82     Weight: 105.5 kg (232 lb 9.4 oz)  Body mass index is 36.43 kg/m².    Intake/Output Summary (Last 24 hours) at 6/18/2024 1109  Last data filed at 6/17/2024 1852  Gross per 24 hour   Intake 615.09 ml   Output 2650 ml   Net -2034.91 ml         Physical Exam  Constitutional:       Appearance: Normal appearance. She is obese.   HENT:      Head: Normocephalic and atraumatic.      Right Ear: External ear normal.      Left Ear: External ear normal.   Cardiovascular:      Rate and Rhythm: Normal rate and regular rhythm.      Pulses: Normal pulses.      Heart sounds: Normal heart sounds.   Pulmonary:      Effort: Pulmonary effort is normal.      Breath sounds: Normal breath sounds.   Abdominal:      General: Abdomen is flat.      Palpations: Abdomen is soft.   Skin:     General: Skin is warm.      Capillary Refill: Capillary refill takes less than 2 seconds.   Neurological:      Mental Status: Mental status is at baseline.             Significant Labs: All pertinent labs within the past 24 hours have been reviewed.  CBC:   Recent Labs   Lab 06/17/24  0546 06/18/24  0806   WBC 7.89 23.87*   HGB 8.7* 9.3*   HCT  29.1* 31.2*    411     CMP:   Recent Labs   Lab 06/17/24  0546 06/18/24  0806   *  131* 133*   K 4.1 4.5   CL 97 98   CO2 20* 21*   GLU 90 121*   BUN 23* 12   CREATININE 6.5* 4.4*   CALCIUM 9.3 9.4   PROT  --  7.4   ALBUMIN 2.6* 2.7*   BILITOT  --  0.4   ALKPHOS  --  74   AST  --  24   ALT  --  24   ANIONGAP 14 14       Significant Imaging: I have reviewed all pertinent imaging results/findings within the past 24 hours.    Assessment/Plan:      * Sepsis  This patient does have evidence of infective focus. My overall impression is sepsis. Source: Skin and Soft Tissue (location Abdominal). Not requiring pressors. Source control achieved by: vanc/meropenem.    - Concerns septic source may be PEG site with associated purulent wound despite Wound Care attempts to protect with barriers. PEG placed by General Surgery 2/2024. General Surgery consulted regarding PEG site ordered CT abdomen. Currently in place.   - Continue meropenem as patient fevered; assessing for skin involvement. CT pan scan w/ IV contrast to make sure there isnt underlying infective focus.   - US extremities negative for thrombus   - ID reconsulted for further assistance    Vulvovaginal candidiasis  Noted 5/29, given 150mg fluconazole x1      Irritant contact dermatitis due friction or contact with other specified body fluids  Wound care following       Debility  Needs:  Wheelchair: patient has a mobility limitation that significantly impairs her ability to participate in one or more mobility related activities of daily living (MRADL's) such as toileting, feeding, dressing, grooming, and bathing in customary locations in the home.  The mobility limitation cannot be sufficiently resolved by the use of a cane or walker.   The use of a manual wheelchair will significantly improve the patient's ability to participate in MRADLS and the patient will use it on regular basis in the home.  Family/pt has expressed her willingness to use a manual  wheelchair in the home.  She also has a caregiver who is capable of assisting in mobility.    Mrs Saravia requires a hospital bed due to her requiring positioning of the body in ways not feasible with an ordinary bed to alleviate pain/ is completely immobile /or limited mobility and cannot independently make changes in body position without the use of the bed.  The positioning of the body cannot be sufficiently resolved by the use of pillows and wedges    Patient has a mobility limitation that significantly impairs their ability to participate in one or more mobility related activities of daily living, including toileting. This deficit can be resolved by using a bedside commode. Patient demonstrates mobility limitations that will cause them to be confined to one room at home without bathroom access for up to 30 days. Using a bedside commode will greatly improve the patient's ability to participate in MRADLs       Complication of vascular dialysis catheter  Cath intermittently clogging, not resolving with cath-hedy, going for IR venogram 4/30 with possible exchange. S/p exchange with IR on 4/30      Constipation  Resolved     Moderate malnutrition  Nutrition consulted. Most recent weight and BMI monitored-     Measurements:  Wt Readings from Last 1 Encounters:   06/11/24 105.5 kg (232 lb 9.4 oz)   Body mass index is 36.43 kg/m².    Patient has been screened and assessed by RD.    Malnutrition Type:  Context: acute illness or injury  Level: moderate    Malnutrition Characteristic Summary:  Weight Loss (Malnutrition): 10% in 6 months  Subcutaneous Fat (Malnutrition): mild depletion  Muscle Mass (Malnutrition): mild depletion  Fluid Accumulation (Malnutrition): mild    Interventions/Recommendations (treatment strategy):  - TF  - previous history of failed swallow studies    Prolonged QT interval  Qtc 526 [04/10/24]. Limit Qtc prolonging drugs as able    Acute cystitis with hematuria  Resolved       Spastic hemiplegia of  "right dominant side as late effect of cerebrovascular disease  Important that patient is able to sit in chair for 4h for dialysis placement needs, even if she requires lift/assistance getting into the chair.This patient has Chronic right hemiplegia due to stroke. Physical therapy services has been scheduled. Continue all standard measures for pressure injury prevention and consult wound care for any wounds (chronic or acute).    ESRD (end stage renal disease) on dialysis  Creatine stable for now. BMP reviewed- noted Estimated Creatinine Clearance: 18.5 mL/min (A) (based on SCr of 4.4 mg/dL (H)). according to latest data. Based on current GFR, CKD stage is end stage.  Monitor UOP and serial BMP and adjust therapy as needed. Renally dose meds. Avoid nephrotoxic medications and procedures.    -- HD MWF (~1L fluid removal on average per session)  -- nephro following  -- Hypophosphatemic on sevelamer. Reached out to nephrology for decrease in phosph binder to improve phosph levels  --SW onboard for placement to [facility] to continue dialysis. Once patient gets accepted at [facility], next step will be to work with daughter on long-term NH placement.     PEG (percutaneous endoscopic gastrostomy) status  On PEG tube.Wound care with PEG dressing changes.   - CT abdomen with PEG in correct location  - TF, advancing to goal of 50        Status post tracheostomy  Resolved, decannulated 4/30    Leukocytosis  See "Sepsis"      Acute encephalopathy  Resolved    Type 2 diabetes mellitus with hyperglycemia, with long-term current use of insulin  Adjusting insulin to account for diabetic TF    Patient's FSGs are controlled on current medication regimen.  Last A1c reviewed-   Lab Results   Component Value Date    HGBA1C 6.2 (H) 05/27/2024     Most recent fingerstick glucose reviewed-   Recent Labs   Lab 06/17/24 1810 06/17/24 2056   POCTGLUCOSE 89 85       Current correctional scale  Medium  Maintain anti-hyperglycemic dose " as follows-   Antihyperglycemics (From admission, onward)      Start     Stop Route Frequency Ordered    06/09/24 2100  insulin glargine U-100 (Lantus) pen 13 Units         -- SubQ Nightly 06/09/24 0756    06/09/24 1255  insulin aspart U-100 pen 0-10 Units         -- SubQ Before meals & nightly PRN 06/09/24 1155          Hold Oral hypoglycemics while patient is in the hospital.  POCT glucose checks   Continue tube feeds    Hyponatremia  Patient has hyponatremia which is uncontrolled,We will aim to correct the sodium by 4-6mEq in 24 hours. We will monitor sodium Daily. The hyponatremia is due to ESRD. Discussed with nephrology, dialysate bath adjusted to account for and correct hyponatremia.  Recent Labs   Lab 06/18/24  0806   *         Ros's gangrene  Pt experienced severe episode of ros's gangrene in January of 2024. Pt underwent extensive course, currently still healing from this, but no longer has wound vac. Pt's wound currently covered with bandage. Picture in media tabs    - Wound care while inpatient  - NH with wound care orders        VTE Risk Mitigation (From admission, onward)           Ordered     heparin (porcine) injection 1,000 Units  As needed (PRN)         06/10/24 0035     heparin (porcine) injection 5,000 Units  Every 8 hours         05/29/24 0823     heparin (porcine) injection 3,000 Units  As needed (PRN)         04/17/24 0825                    Discharge Planning   ZEKE: 6/19/2024     Code Status: Full Code   Is the patient medically ready for discharge?: No    Reason for patient still in hospital (select all that apply): Patient trending condition  Discharge Plan A: New Nursing Home placement - shelter care facility ([facility])                  Jatin Hutchins MD  Department of Hospital Medicine   [facility] - Telemetry Stepdown

## 2024-06-18 NOTE — PLAN OF CARE
Problem: Infection  Goal: Absence of Infection Signs and Symptoms  Outcome: Progressing     Problem: Skin Injury Risk Increased  Goal: Skin Health and Integrity  Outcome: Progressing     Problem: Adult Inpatient Plan of Care  Goal: Plan of Care Review  Outcome: Progressing     Problem: Chronic Kidney Disease  Goal: Optimal Coping with Chronic Illness  Outcome: Progressing  Goal: Electrolyte Balance  Outcome: Progressing  Goal: Fluid Balance  Outcome: Progressing  Goal: Optimal Functional Ability  Outcome: Progressing

## 2024-06-18 NOTE — PROGRESS NOTES
[facility] - Telemetry Stepdown  Nephrology  Progress Note    Patient Name: Sarah Saravia  MRN: [id number]  Admission Date: 4/10/2024  Hospital Length of Stay: 68 days  Attending Provider: Virgil Fierro III*   Primary Care Physician: Deanna, Primary Doctor  Principal Problem:Sepsis    Subjective:     HPI: The patient is a 53 y.o. 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 The patient is unable to provide adequate history. Additional history and patient information was obtained from patient's daughter, past medical records and ER records. Per chart, she was undergoing dialysis Wednesday and was noted to be more lethargic than usual despite completing her HD session. EMS was called, fever of a 100. In the ED, UA 2+ leuks, >100 WBC, many bacteria, WBC 17, CT abd/pelvis concerning for cystitis. Given vanc/zosyn and admitted.     Of note, the patient was initiated on RRT in February 2024 after being admitted with ALVARADO 2/2 iATN due to septic shock. Perm cath placed on 2/29/24. She was transferred to LT on 3/12. Nephrology consulted for management of ESRD and HD treatment.      Interval History: No acute events    Review of patient's allergies indicates:  No Known Allergies  Current Facility-Administered Medications   Medication Frequency    acetaminophen oral solution 650 mg Q6H PRN    ascorbic acid (vitamin C) tablet 500 mg BID    aspirin chewable tablet 81 mg Daily    atorvastatin tablet 40 mg Daily    dextrose 10% bolus 125 mL 125 mL PRN    dextrose 10% bolus 250 mL 250 mL PRN    epoetin merry injection 6,100 Units Every Mon, Wed, Fri    glucagon (human recombinant) injection 1 mg PRN    glucose chewable tablet 16 g PRN    glucose chewable tablet 24 g PRN    heparin (porcine) injection 3,000 Units PRN    heparin (porcine) injection 5,000 Units Q8H     hepatitis B virus vacc.rec(PF) injection 20 mcg vaccine x 1 dose    insulin aspart U-100 pen 0-10 Units QID (AC + HS) PRN    insulin glargine U-100 (Lantus) pen 13 Units QHS    meropenem (MERREM) 500 mg in sodium chloride 0.9 % 100 mL IVPB (MB+) Once    metoprolol tartrate (LOPRESSOR) tablet 25 mg BID    ondansetron 4 mg/5 mL solution 4 mg Q6H PRN    oxyCODONE 5 mg/5 mL solution 5 mg Q4H PRN    pantoprazole suspension 40 mg Daily    polyethylene glycol packet 17 g Daily    sevelamer carbonate pwpk 0.8 g TID WM    sodium chloride 0.9% bolus 250 mL 250 mL PRN    sodium chloride 0.9% flush 10 mL Q12H PRN       Objective:     Vital Signs (Most Recent):  Temp: 98.3 °F (36.8 °C) (06/17/24 2016)  Pulse: (!) 126 (06/17/24 2132)  Resp: 19 (06/17/24 2016)  BP: 121/82 (06/17/24 2132)  SpO2: 98 % (06/17/24 2132) Vital Signs (24h Range):  Temp:  [97.8 °F (36.6 °C)-98.7 °F (37.1 °C)] 98.3 °F (36.8 °C)  Pulse:  [] 126  Resp:  [16-20] 19  SpO2:  [96 %-99 %] 98 %  BP: (113-146)/(69-94) 121/82     Weight: 105.5 kg (232 lb 9.4 oz) (06/11/24 1330)  Body mass index is 36.43 kg/m².  Body surface area is 2.23 meters squared.    I/O last 3 completed shifts:  In: 1035.1 [I.V.:315.1; Other:300; NG/GT:420]  Out: 2650 [Other:2650]     Physical Exam  Cardiovascular:      Rate and Rhythm: Regular rhythm.      Pulses: Normal pulses.   Pulmonary:      Breath sounds: Normal breath sounds.   Abdominal:      Palpations: Abdomen is soft.   Neurological:      Mental Status: Mental status is at baseline.          Significant Labs:  All labs within the past 24 hours have been reviewed.         Assessment/Plan:     Renal/  ESRD (end stage renal disease) on dialysis  53 y.o. Black or  Female ESRD-HD M-W-F at [facility] presents to ED on 4/10/2024 with UTI.    Of note, the patient was initiated on RRT in February 2024 after being admitted with ALVARADO 2/2 iATN due to septic shock. Perm cath placed on 2/29/24. She was transferred to [facility] on  3/12. Deemed ESRD at [facility].  Nephrology consulted for inpatient ESRD-HD management    Assessment:   - HD today  - Continue to monitor intake and output  - Please avoid gadolinium, fleets, phos-based laxatives, NSAIDs  - Dialysis thrice weekly unless more urgent indications arise. Will evaluate RRT requirements Daily.      Lab Results   Component Value Date    FESATURATED 10 (L) 03/15/2024    FERRITIN 414 (H) 03/15/2024       - Goal in ESRD is Hgb of 10-11. Continue EPO.       Secondary hyperparathyroid of renal origin   - phos 4.2 - no indication for phos binders           Thank you for your consult. I will follow-up with patient. Please contact us if you have any additional questions.    Jovanny Rodriguez MD  Nephrology  [facility] - Telemetry Stepdown

## 2024-06-18 NOTE — PROGRESS NOTES
Pharmacokinetic Initial Assessment: IV Vancomycin    Assessment/Plan:    -Vancomycin restarted for fevers/leukocytosis  -Add on vanco level to morning labs resulted at 14.8, nearly at goal of 15 - 20  -Re-dose with vancomycin 500 mg IVPB x1  -Next HD tomorrow, no need for random level prior, will get next random level on 06/21 for preHD    Please contact pharmacy at extension [phone] with any questions regarding this assessment.     Thank you for the consult,   Brett Morenokade       Patient brief summary:  Sarah Saravia is a 53 y.o. female initiated on antimicrobial therapy with IV Vancomycin for treatment of suspected  empiric therapy    Drug Allergies:   Review of patient's allergies indicates:  No Known Allergies    Actual Body Weight:   106 kg    Renal Function:   Estimated Creatinine Clearance: 18.5 mL/min (A) (based on SCr of 4.4 mg/dL (H)).,     Dialysis Method (if applicable):  intermittent HD    CBC (last 72 hours):  Recent Labs   Lab Result Units 06/16/24  0639 06/17/24  0546 06/18/24  0806   WBC K/uL 7.59 7.89 23.87*   Hemoglobin g/dL 8.4* 8.7* 9.3*   Hematocrit % 29.1* 29.1* 31.2*   Platelets K/uL 302 335 411   Gran % % 52.9 54.5 81.9*   Lymph % % 25.6 26.5 10.6*   Mono % % 13.4 10.3 5.8   Eosinophil % % 6.5 7.1 0.6   Basophil % % 0.7 0.6 0.3   Differential Method  Automated Automated Automated       Metabolic Panel (last 72 hours):  Recent Labs   Lab Result Units 06/15/24  1654 06/15/24  2359 06/16/24  0639 06/17/24  0546 06/18/24  0806   Sodium mmol/L  --   --  132* 131*  131* 133*   Potassium mmol/L 3.9 3.8  --  4.1 4.5   Chloride mmol/L  --   --   --  97 98   CO2 mmol/L  --   --   --  20* 21*   Glucose mg/dL  --   --   --  90 121*   BUN mg/dL  --   --   --  23* 12   Creatinine mg/dL  --   --   --  6.5* 4.4*   Albumin g/dL  --   --   --  2.6* 2.7*   Total Bilirubin mg/dL  --   --   --   --  0.4   Alkaline Phosphatase U/L  --   --   --   --  74   AST U/L  --   --   --   --  24   ALT U/L  --   --   --    --  24   Magnesium mg/dL  --   --  2.0 2.1 2.0   Phosphorus mg/dL  --   --  4.0 4.3  4.3 2.4*       Drug levels (last 3 results):  Recent Labs   Lab Result Units 06/18/24 0806   Vancomycin, Random ug/mL 14.8       Microbiologic Results:  Microbiology Results (last 7 days)       Procedure Component Value Units Date/Time    Blood culture [[id number]] Collected: 06/18/24 0807    Order Status: Sent Specimen: Blood from Peripheral, Hand, Left Updated: 06/18/24 0844    Blood culture [[id number]] Collected: 06/18/24 0806    Order Status: Sent Specimen: Blood from Peripheral, Antecubital, Right Updated: 06/18/24 0844    Blood culture [[id number]] Collected: 06/13/24 0622    Order Status: Completed Specimen: Blood Updated: 06/18/24 0812     Blood Culture, Routine No growth after 5 days.    Blood culture [[id number]] Collected: 06/13/24 0622    Order Status: Completed Specimen: Blood Updated: 06/18/24 0812     Blood Culture, Routine No growth after 5 days.    Blood culture [[id number]] Collected: 06/10/24 0719    Order Status: Completed Specimen: Blood Updated: 06/15/24 1012     Blood Culture, Routine No growth after 5 days.    Narrative:      Lft forearm    Blood culture [[id number]] Collected: 06/10/24 0719    Order Status: Completed Specimen: Blood Updated: 06/15/24 1012     Blood Culture, Routine No growth after 5 days.    Narrative:      Lft hand    Respiratory Infection Panel (PCR), Nasopharyngeal [[id number]] Collected: 06/14/24 0922    Order Status: Completed Specimen: Nasopharyngeal Swab Updated: 06/14/24 1117     Respiratory Infection Panel Source NP Swab     Adenovirus Not Detected     Coronavirus 229E, Common Cold Virus Not Detected     Coronavirus HKU1, Common Cold Virus Not Detected     Coronavirus NL63, Common Cold Virus Not Detected     Coronavirus OC43, Common Cold Virus Not Detected     Comment: The Coronavirus strains detected in this test cause the common cold.  These strains are not the COVID-19 (novel  Coronavirus)strain   associated with the respiratory disease outbreak.          SARS-CoV2 (COVID-19) Qualitative PCR Not Detected     Human Metapneumovirus Not Detected     Human Rhinovirus/Enterovirus Not Detected     Influenza A (subtypes H1, H1-2009,H3) Not Detected     Influenza B Not Detected     Parainfluenza Virus 1 Not Detected     Parainfluenza Virus 2 Not Detected     Parainfluenza Virus 3 Not Detected     Parainfluenza Virus 4 Not Detected     Respiratory Syncytial Virus Not Detected     Bordetella Parapertussis (GF2724) Not Detected     Bordetella pertussis (ptxP) Not Detected     Chlamydia pneumoniae Not Detected     Mycoplasma pneumoniae Not Detected    Narrative:      Assay not valid for lower respiratory specimens, alternate  testing required.    Respiratory Infection Panel (PCR), Nasopharyngeal [[id number]]     Order Status: Canceled Specimen: Nasopharyngeal Swab

## 2024-06-18 NOTE — ASSESSMENT & PLAN NOTE
53 y.o. Black or  Female ESRD-HD M-W-F at [facility] presents to ED on 4/10/2024 with UTI.    Of note, the patient was initiated on RRT in February 2024 after being admitted with ALVARADO 2/2 iATN due to septic shock. Perm cath placed on 2/29/24. She was transferred to [facility] on 3/12. Deemed ESRD at [facility].  Nephrology consulted for inpatient ESRD-HD management    Assessment:   - HD tomorrow.  - Continue to monitor intake and output  - Please avoid gadolinium, fleets, phos-based laxatives, NSAIDs  - Dialysis thrice weekly unless more urgent indications arise. Will evaluate RRT requirements Daily.      Lab Results   Component Value Date    FESATURATED 10 (L) 03/15/2024    FERRITIN 414 (H) 03/15/2024       - Goal in ESRD is Hgb of 10-11. Continue EPO.       Secondary hyperparathyroid of renal origin   - phos 4.2 - no indication for phos binders

## 2024-06-18 NOTE — ASSESSMENT & PLAN NOTE
This patient does have evidence of infective focus. My overall impression is sepsis. Source: Skin and Soft Tissue (location Abdominal). Not requiring pressors. Source control achieved by: vanc/meropenem.    - Concerns septic source may be PEG site with associated purulent wound despite Wound Care attempts to protect with barriers. PEG placed by General Surgery 2/2024. General Surgery consulted regarding PEG site ordered CT abdomen. Currently in place.   - Continue meropenem as patient fevered; assessing for skin involvement. CT pan scan w/ IV contrast to make sure there isnt underlying infective focus.   - US extremities negative for thrombus   - ID reconsulted for further assistance

## 2024-06-18 NOTE — SUBJECTIVE & OBJECTIVE
Interval History: Patient had episode of fever to 101.2. Discussing with ID, re-scanning patient to assess for possible infectious focus and continuuing abx. Increasing TF and FWF    Review of Systems   Unable to perform ROS: Patient nonverbal     Objective:     Vital Signs (Most Recent):  Temp: 99.9 °F (37.7 °C) (06/18/24 0729)  Pulse: 98 (06/18/24 1104)  Resp: 18 (06/18/24 0729)  BP: 127/82 (06/18/24 0843)  SpO2: 98 % (06/18/24 0729) Vital Signs (24h Range):  Temp:  [98.2 °F (36.8 °C)-101.2 °F (38.4 °C)] 99.9 °F (37.7 °C)  Pulse:  [] 98  Resp:  [16-19] 18  SpO2:  [98 %-100 %] 98 %  BP: (101-146)/(69-94) 127/82     Weight: 105.5 kg (232 lb 9.4 oz)  Body mass index is 36.43 kg/m².    Intake/Output Summary (Last 24 hours) at 6/18/2024 1109  Last data filed at 6/17/2024 1852  Gross per 24 hour   Intake 615.09 ml   Output 2650 ml   Net -2034.91 ml         Physical Exam  Constitutional:       Appearance: Normal appearance. She is obese.   HENT:      Head: Normocephalic and atraumatic.      Right Ear: External ear normal.      Left Ear: External ear normal.   Cardiovascular:      Rate and Rhythm: Normal rate and regular rhythm.      Pulses: Normal pulses.      Heart sounds: Normal heart sounds.   Pulmonary:      Effort: Pulmonary effort is normal.      Breath sounds: Normal breath sounds.   Abdominal:      General: Abdomen is flat.      Palpations: Abdomen is soft.   Skin:     General: Skin is warm.      Capillary Refill: Capillary refill takes less than 2 seconds.   Neurological:      Mental Status: Mental status is at baseline.             Significant Labs: All pertinent labs within the past 24 hours have been reviewed.  CBC:   Recent Labs   Lab 06/17/24  0546 06/18/24  0806   WBC 7.89 23.87*   HGB 8.7* 9.3*   HCT 29.1* 31.2*    411     CMP:   Recent Labs   Lab 06/17/24  0546 06/18/24  0806   *  131* 133*   K 4.1 4.5   CL 97 98   CO2 20* 21*   GLU 90 121*   BUN 23* 12   CREATININE 6.5* 4.4*    CALCIUM 9.3 9.4   PROT  --  7.4   ALBUMIN 2.6* 2.7*   BILITOT  --  0.4   ALKPHOS  --  74   AST  --  24   ALT  --  24   ANIONGAP 14 14       Significant Imaging: I have reviewed all pertinent imaging results/findings within the past 24 hours.

## 2024-06-18 NOTE — ASSESSMENT & PLAN NOTE
Adjusting insulin to account for diabetic TF    Patient's FSGs are controlled on current medication regimen.  Last A1c reviewed-   Lab Results   Component Value Date    HGBA1C 6.2 (H) 05/27/2024     Most recent fingerstick glucose reviewed-   Recent Labs   Lab 06/17/24 1810 06/17/24 2056   POCTGLUCOSE 89 85       Current correctional scale  Medium  Maintain anti-hyperglycemic dose as follows-   Antihyperglycemics (From admission, onward)    Start     Stop Route Frequency Ordered    06/09/24 2100  insulin glargine U-100 (Lantus) pen 13 Units         -- SubQ Nightly 06/09/24 0756    06/09/24 1255  insulin aspart U-100 pen 0-10 Units         -- SubQ Before meals & nightly PRN 06/09/24 1155        Hold Oral hypoglycemics while patient is in the hospital.  POCT glucose checks   Continue tube feeds

## 2024-06-18 NOTE — ASSESSMENT & PLAN NOTE
Creatine stable for now. BMP reviewed- noted Estimated Creatinine Clearance: 18.5 mL/min (A) (based on SCr of 4.4 mg/dL (H)). according to latest data. Based on current GFR, CKD stage is end stage.  Monitor UOP and serial BMP and adjust therapy as needed. Renally dose meds. Avoid nephrotoxic medications and procedures.    -- HD MWF (~1L fluid removal on average per session)  -- nephro following  -- Hypophosphatemic on sevelamer. Reached out to nephrology for decrease in phosph binder to improve phosph levels  --SW onboard for placement to [facility] to continue dialysis. Once patient gets accepted at [facility], next step will be to work with daughter on [facility].

## 2024-06-18 NOTE — SUBJECTIVE & OBJECTIVE
Interval History: No acute events    Review of patient's allergies indicates:  No Known Allergies  Current Facility-Administered Medications   Medication Frequency    acetaminophen oral solution 650 mg Q6H PRN    ascorbic acid (vitamin C) tablet 500 mg BID    aspirin chewable tablet 81 mg Daily    atorvastatin tablet 40 mg Daily    dextrose 10% bolus 125 mL 125 mL PRN    dextrose 10% bolus 250 mL 250 mL PRN    epoetin merry injection 6,100 Units Every Mon, Wed, Fri    glucagon (human recombinant) injection 1 mg PRN    glucose chewable tablet 16 g PRN    glucose chewable tablet 24 g PRN    heparin (porcine) injection 3,000 Units PRN    heparin (porcine) injection 5,000 Units Q8H    hepatitis B virus vacc.rec(PF) injection 20 mcg vaccine x 1 dose    insulin aspart U-100 pen 0-10 Units QID (AC + HS) PRN    insulin glargine U-100 (Lantus) pen 13 Units QHS    meropenem (MERREM) 500 mg in sodium chloride 0.9 % 100 mL IVPB (MB+) Once    metoprolol tartrate (LOPRESSOR) tablet 25 mg BID    ondansetron 4 mg/5 mL solution 4 mg Q6H PRN    oxyCODONE 5 mg/5 mL solution 5 mg Q4H PRN    pantoprazole suspension 40 mg Daily    polyethylene glycol packet 17 g Daily    sevelamer carbonate pwpk 0.8 g TID WM    sodium chloride 0.9% bolus 250 mL 250 mL PRN    sodium chloride 0.9% flush 10 mL Q12H PRN       Objective:     Vital Signs (Most Recent):  Temp: 98.3 °F (36.8 °C) (06/17/24 2016)  Pulse: (!) 126 (06/17/24 2132)  Resp: 19 (06/17/24 2016)  BP: 121/82 (06/17/24 2132)  SpO2: 98 % (06/17/24 2132) Vital Signs (24h Range):  Temp:  [97.8 °F (36.6 °C)-98.7 °F (37.1 °C)] 98.3 °F (36.8 °C)  Pulse:  [] 126  Resp:  [16-20] 19  SpO2:  [96 %-99 %] 98 %  BP: (113-146)/(69-94) 121/82     Weight: 105.5 kg (232 lb 9.4 oz) (06/11/24 1330)  Body mass index is 36.43 kg/m².  Body surface area is 2.23 meters squared.    I/O last 3 completed shifts:  In: 1035.1 [I.V.:315.1; Other:300; NG/GT:420]  Out: 2650 [Other:2650]     Physical  Exam  Cardiovascular:      Rate and Rhythm: Regular rhythm.      Pulses: Normal pulses.   Pulmonary:      Breath sounds: Normal breath sounds.   Abdominal:      Palpations: Abdomen is soft.   Neurological:      Mental Status: Mental status is at baseline.          Significant Labs:  All labs within the past 24 hours have been reviewed.

## 2024-06-18 NOTE — ASSESSMENT & PLAN NOTE
Patient has hyponatremia which is uncontrolled,We will aim to correct the sodium by 4-6mEq in 24 hours. We will monitor sodium Daily. The hyponatremia is due to ESRD. Discussed with nephrology, dialysate bath adjusted to account for and correct hyponatremia.  Recent Labs   Lab 06/18/24  0806   *

## 2024-06-18 NOTE — SUBJECTIVE & OBJECTIVE
Interval History: Tanner fever    Review of patient's allergies indicates:  No Known Allergies  Current Facility-Administered Medications   Medication Frequency    acetaminophen oral solution 650 mg Q6H PRN    ascorbic acid (vitamin C) tablet 500 mg BID    aspirin chewable tablet 81 mg Daily    atorvastatin tablet 40 mg Daily    dextrose 10% bolus 125 mL 125 mL PRN    dextrose 10% bolus 250 mL 250 mL PRN    epoetin merry injection 6,100 Units Every Mon, Wed, Fri    glucagon (human recombinant) injection 1 mg PRN    glucose chewable tablet 16 g PRN    glucose chewable tablet 24 g PRN    heparin (porcine) injection 3,000 Units PRN    heparin (porcine) injection 5,000 Units Q8H    hepatitis B virus vacc.rec(PF) injection 20 mcg vaccine x 1 dose    insulin aspart U-100 pen 0-10 Units QID (AC + HS) PRN    insulin glargine U-100 (Lantus) pen 13 Units QHS    meropenem (MERREM) 500 mg in sodium chloride 0.9 % 100 mL IVPB (MB+) Q12H    metoprolol tartrate (LOPRESSOR) tablet 25 mg BID    ondansetron 4 mg/5 mL solution 4 mg Q6H PRN    oxyCODONE 5 mg/5 mL solution 5 mg Q4H PRN    pantoprazole suspension 40 mg Daily    polyethylene glycol packet 17 g Daily    sevelamer carbonate pwpk 0.8 g TID WM    sodium chloride 0.9% bolus 250 mL 250 mL PRN    sodium chloride 0.9% flush 10 mL Q12H PRN    vancomycin - pharmacy to dose pharmacy to manage frequency       Objective:     Vital Signs (Most Recent):  Temp: 98.9 °F (37.2 °C) (06/18/24 1544)  Pulse: 96 (06/18/24 1544)  Resp: 18 (06/18/24 1544)  BP: 131/82 (06/18/24 1544)  SpO2: 98 % (06/18/24 1544) Vital Signs (24h Range):  Temp:  [98.2 °F (36.8 °C)-101.2 °F (38.4 °C)] 98.9 °F (37.2 °C)  Pulse:  [] 96  Resp:  [16-19] 18  SpO2:  [98 %-100 %] 98 %  BP: (101-133)/(69-93) 131/82     Weight: 105.5 kg (232 lb 9.4 oz) (06/11/24 1330)  Body mass index is 36.43 kg/m².  Body surface area is 2.23 meters squared.    I/O last 3 completed shifts:  In: 1035.1 [I.V.:315.1; Other:300;  NG/GT:420]  Out: 2650 [Other:2650]     Physical Exam  Cardiovascular:      Rate and Rhythm: Normal rate.   Pulmonary:      Effort: Pulmonary effort is normal.   Neurological:      Mental Status: Mental status is at baseline.          Significant Labs:  All labs within the past 24 hours have been reviewed.     Significant Imaging:  Labs: Reviewed  o

## 2024-06-18 NOTE — SUBJECTIVE & OBJECTIVE
Interval hx:  ID re-consulted due to fevers and a leukocytosis.   Blood cx NGTD  CT C/A/P ordered   Patient alert and following few directions. Appears comfortable in no distress.      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    Hypermagnesemia 04/11/2024    POA, Mg 3.2  Daily chem       Morbid obesity     Necrotizing fasciitis        Past Surgical History:   Procedure Laterality Date    CLOSURE OF WOUND Right 2/22/2024    Procedure: CLOSURE, WOUND;  Surgeon: Steve Cole MD;  Location: [address];  Service: General;  Laterality: Right;    ESOPHAGOGASTRODUODENOSCOPY W/ PEG N/A 2/22/2024    Procedure: EGD, WITH PEG TUBE INSERTION;  Surgeon: Steve Cole MD;  Location: [address];  Service: General;  Laterality: N/A;    INCISION AND DRAINAGE OF PERIRECTAL REGION N/A 1/29/2024    Procedure: INCISION AND DRAINAGE, PERIRECTAL REGION;  Surgeon: Axel Ramsay MD;  Location: [facility] OR;  Service: General;  Laterality: N/A;    LUMBAR PUNCTURE N/A 2/16/2024    Procedure: Lumbar Puncture;  Surgeon: Macy Boykin;  Location: [facility];  Service: Anesthesiology;  Laterality: N/A;    PLACEMENT, TRIALYSIS CATH Right 1/31/2024    Procedure: INSERTION, CATHETER, TRIPLE LUMEN, HEMODIALYSIS, TEMPORARY;  Surgeon: Alvin Junior MD;  Location: [facility] OR;  Service: General;  Laterality: Right;    REPLACEMENT OF WOUND VACUUM-ASSISTED CLOSURE DEVICE Right 2/12/2024    Procedure: REPLACEMENT, WOUND VAC;  Surgeon: Mundo Carmona MD;  Location: [address];  Service: General;  Laterality: Right;    REPLACEMENT OF WOUND VACUUM-ASSISTED CLOSURE DEVICE N/A 2/15/2024    Procedure: REPLACEMENT, WOUND VAC;  Surgeon: Steve Cole MD;  Location: [address];  Service: General;  Laterality: N/A;    REPLACEMENT OF WOUND VACUUM-ASSISTED CLOSURE DEVICE Right 2/19/2024    Procedure: REPLACEMENT, WOUND VAC;  Surgeon: Steve Cole MD;  Location: [address];   Service: General;  Laterality: Right;  RLE/groin    REPLACEMENT OF WOUND VACUUM-ASSISTED CLOSURE DEVICE Right 2/22/2024    Procedure: REPLACEMENT, WOUND VAC;  Surgeon: Steve Cole MD;  Location: [facility] OR 2ND FLR;  Service: General;  Laterality: Right;    TRACHEOSTOMY N/A 2/22/2024    Procedure: CREATION, TRACHEOSTOMY;  Surgeon: Steve Cole MD;  Location: [facility] OR [facility];  Service: General;  Laterality: N/A;    WOUND DEBRIDEMENT Bilateral 2/2/2024    Procedure: DEBRIDEMENT, WOUND;  Surgeon: Steve Cole MD;  Location: [facility] OR [facility];  Service: General;  Laterality: Bilateral;  Bilateral groin  Possible wound vac placement    WOUND DEBRIDEMENT Right 2/6/2024    Procedure: DEBRIDEMENT, WOUND, replace wound vac, possible closure;  Surgeon: Steve Cole MD;  [facility];  Service: General;  Laterality: Right;  RLE    WOUND DEBRIDEMENT Right 2/9/2024    Procedure: DEBRIDEMENT, WOUND w wound vac change;  Surgeon: Steve Cole MD;  Location: [facility] OR [facility];  Service: General;  Laterality: Right;  RLE    WOUND DEBRIDEMENT Right 2/12/2024    Procedure: R thigh wound debridement;  Surgeon: Mundo Carmona MD;  [facility];  Service: General;  Laterality: Right;    WOUND EXPLORATION Right 1/31/2024    Procedure: IRRIGATION & DEBRIDEMENT, WOUND DEBRIDEMENT;  Surgeon: Alvin Junior MD;  Location: [facility] OR;  Service: General;  Laterality: Right;       Review of patient's allergies indicates:  No Known Allergies    Medications:  Medications Prior to Admission   Medication Sig    ascorbic acid, vitamin C, (VITAMIN C) 500 MG tablet 500 mg by Per G Tube route 2 (two) times daily.    pantoprazole sodium (PANTOPRAZOLE ORAL) 40 mg once daily. Liquid via gtube    [DISCONTINUED] amLODIPine (NORVASC) 10 MG tablet 10 mg by Per G Tube route once daily. 0900    [DISCONTINUED] carvediloL (COREG) 25 MG tablet 25 mg by Per G Tube route 2 (two) times daily with meals.    [DISCONTINUED] chlorhexidine  "(PERIDEX) 0.12 % solution Use as directed 15 mLs in the mouth or throat 2 (two) times daily.    [DISCONTINUED] heparin sodium,porcine (HEPARIN, PORCINE,) 5,000 unit/mL injection Inject 7,500 Units into the skin every 8 (eight) hours.    [DISCONTINUED] insulin aspart U-100 (NOVOLOG) 100 unit/mL injection Inject 0-10 Units into the skin as needed for High Blood Sugar. Moderate correction dose sliding scale    [DISCONTINUED] insulin detemir U-100 (LEVEMIR) 100 unit/mL injection Inject 25 Units into the skin 2 (two) times a day.    [DISCONTINUED] psyllium (KONSYL) Powd 1 packet by Per G Tube route once daily.    [DISCONTINUED] sevelamer carbonate (RENVELA) 2.4 gram PwPk 2,400 mg by Per G Tube route 3 (three) times daily.    [DISCONTINUED] zinc sulfate (ZINCATE) 50 mg zinc (220 mg) capsule 220 mg by Per G Tube route once daily.     Antibiotics (From admission, onward)      Start     Stop Route Frequency Ordered    06/18/24 1100  meropenem (MERREM) 500 mg in sodium chloride 0.9 % 100 mL IVPB (MB+)         -- IV Every 12 hours (non-standard times) 06/18/24 0947    06/18/24 1041  vancomycin - pharmacy to dose  (vancomycin IVPB (PEDS and ADULTS))        Placed in "And" Linked Group    -- IV pharmacy to manage frequency 06/18/24 0941          Antifungals (From admission, onward)      None          Antivirals (From admission, onward)      None             Immunization History   Administered Date(s) Administered    PPD Test 08/16/2022, 04/23/2024, 05/16/2024, 05/29/2024, 06/17/2024    Td (ADULT) 11/16/2005       Family History    None       Social History     Socioeconomic History    Marital status: Single   Tobacco Use    Smoking status: Unknown     Social Determinants of Health     Financial Resource Strain: Patient Unable To Answer (3/14/2024)    Overall Financial Resource Strain (CARDIA)     Difficulty of Paying Living Expenses: Patient unable to answer   Recent Concern: Financial Resource Strain - High Risk (3/9/2024)    " Overall Financial Resource Strain (CARDIA)     Difficulty of Paying Living Expenses: Very hard   Food Insecurity: Patient Unable To Answer (3/14/2024)    Hunger Vital Sign     Worried About Running Out of Food in the Last Year: Patient unable to answer     Ran Out of Food in the Last Year: Patient unable to answer   Transportation Needs: Patient Unable To Answer (3/14/2024)    PRAPARE - Transportation     Lack of Transportation (Medical): Patient unable to answer     Lack of Transportation (Non-Medical): Patient unable to answer   Physical Activity: Inactive (3/13/2024)    Exercise Vital Sign     Days of Exercise per Week: 0 days     Minutes of Exercise per Session: 0 min   Stress: Patient Unable To Answer (3/14/2024)    [employer] - Occupational Stress Questionnaire     Feeling of Stress : Patient unable to answer   Housing Stability: Patient Unable To Answer (3/14/2024)    Housing Stability Vital Sign     Unable to Pay for Housing in the Last Year: Patient unable to answer     Number of Places Lived in the Last Year: 1     Unstable Housing in the Last Year: Patient unable to answer   Recent Concern: Housing Stability - High Risk (3/9/2024)    Housing Stability Vital Sign     Unable to Pay for Housing in the Last Year: Yes     Unstable Housing in the Last Year: No     Review of Systems   Unable to perform ROS: Patient nonverbal     Objective:     Vital Signs (Most Recent):  Temp: 99.4 °F (37.4 °C) (06/18/24 1127)  Pulse: 96 (06/18/24 1448)  Resp: 18 (06/18/24 1127)  BP: 129/82 (06/18/24 1127)  SpO2: 98 % (06/18/24 1127) Vital Signs (24h Range):  Temp:  [98.2 °F (36.8 °C)-101.2 °F (38.4 °C)] 99.4 °F (37.4 °C)  Pulse:  [] 96  Resp:  [16-19] 18  SpO2:  [98 %-100 %] 98 %  BP: (101-146)/(69-94) 129/82     Weight: 105.5 kg (232 lb 9.4 oz)  Body mass index is 36.43 kg/m².    Estimated Creatinine Clearance: 18.5 mL/min (A) (based on SCr of 4.4 mg/dL (H)).     Physical Exam  Nursing note  reviewed.   Constitutional:       General: She is not in acute distress.     Appearance: She is obese. She is not toxic-appearing.   HENT:      Head: Normocephalic and atraumatic.   Eyes:      General: No scleral icterus.     Conjunctiva/sclera: Conjunctivae normal.   Cardiovascular:      Rate and Rhythm: Regular rhythm. Tachycardia present.   Pulmonary:      Effort: Pulmonary effort is normal. No respiratory distress.   Abdominal:      General: There is no distension.      Palpations: Abdomen is soft.      Comments: G tube   Skin:     General: Skin is warm and dry.      Comments: R chest HD line w/o evidence of infection    Neurological:      Mental Status: She is alert.      Comments: Awake and alert. Following few commands. Non verbal          Significant Labs: CBC:   Recent Labs   Lab 06/17/24 0546 06/18/24 0806   WBC 7.89 23.87*   HGB 8.7* 9.3*   HCT 29.1* 31.2*    411     CMP:   Recent Labs   Lab 06/17/24 0546 06/18/24 0806   *  131* 133*   K 4.1 4.5   CL 97 98   CO2 20* 21*   GLU 90 121*   BUN 23* 12   CREATININE 6.5* 4.4*   CALCIUM 9.3 9.4   PROT  --  7.4   ALBUMIN 2.6* 2.7*   BILITOT  --  0.4   ALKPHOS  --  74   AST  --  24   ALT  --  24   ANIONGAP 14 14     Microbiology Results (last 7 days)       Procedure Component Value Units Date/Time    Blood culture [[id number]] Collected: 06/18/24 0807    Order Status: Sent Specimen: Blood from Peripheral, Hand, Left Updated: 06/18/24 0844    Blood culture [[id number]] Collected: 06/18/24 0806    Order Status: Sent Specimen: Blood from Peripheral, Antecubital, Right Updated: 06/18/24 0844    Blood culture [[id number]] Collected: 06/13/24 0622    Order Status: Completed Specimen: Blood Updated: 06/18/24 0812     Blood Culture, Routine No growth after 5 days.    Blood culture [[id number]] Collected: 06/13/24 0622    Order Status: Completed Specimen: Blood Updated: 06/18/24 0812     Blood Culture, Routine No growth after 5 days.    Blood culture  [[id number]] Collected: 06/10/24 0719    Order Status: Completed Specimen: Blood Updated: 06/15/24 1012     Blood Culture, Routine No growth after 5 days.    Narrative:      Lft forearm    Blood culture [[id number]] Collected: 06/10/24 0719    Order Status: Completed Specimen: Blood Updated: 06/15/24 1012     Blood Culture, Routine No growth after 5 days.    Narrative:      Lft hand    Respiratory Infection Panel (PCR), Nasopharyngeal [[id number]] Collected: 06/14/24 0922    Order Status: Completed Specimen: Nasopharyngeal Swab Updated: 06/14/24 1117     Respiratory Infection Panel Source NP Swab     Adenovirus Not Detected     Coronavirus 229E, Common Cold Virus Not Detected     Coronavirus HKU1, Common Cold Virus Not Detected     Coronavirus NL63, Common Cold Virus Not Detected     Coronavirus OC43, Common Cold Virus Not Detected     Comment: The Coronavirus strains detected in this test cause the common cold.  These strains are not the COVID-19 (novel Coronavirus)strain   associated with the respiratory disease outbreak.          SARS-CoV2 (COVID-19) Qualitative PCR Not Detected     Human Metapneumovirus Not Detected     Human Rhinovirus/Enterovirus Not Detected     Influenza A (subtypes H1, H1-2009,H3) Not Detected     Influenza B Not Detected     Parainfluenza Virus 1 Not Detected     Parainfluenza Virus 2 Not Detected     Parainfluenza Virus 3 Not Detected     Parainfluenza Virus 4 Not Detected     Respiratory Syncytial Virus Not Detected     Bordetella Parapertussis (SR6265) Not Detected     Bordetella pertussis (ptxP) Not Detected     Chlamydia pneumoniae Not Detected     Mycoplasma pneumoniae Not Detected    Narrative:      Assay not valid for lower respiratory specimens, alternate  testing required.    Respiratory Infection Panel (PCR), Nasopharyngeal [[id number]]     Order Status: Canceled Specimen: Nasopharyngeal Swab             Significant Imaging: I have reviewed all pertinent imaging results/findings  within the past 24 hours.

## 2024-06-18 NOTE — CARE UPDATE
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    Last VS: /78   Pulse (!) 116   Temp (!) 101.2 °F (38.4 °C)   Resp 18   Ht 5' 7" (1.702 m)   Wt 105.5 kg (232 lb 9.4 oz)   SpO2 99%   BMI 36.43 kg/m²     24H Vital Sign Range:  Temp:  [98.2 °F (36.8 °C)-101.2 °F (38.4 °C)]   Pulse:  []   Resp:  [16-19]   BP: (101-146)/(69-94)   SpO2:  [96 %-100 %]     Level of Consciousness (AVPU): alert    Recent Labs     06/16/24  0639 06/17/24  0546   WBC 7.59 7.89   HGB 8.4* 8.7*   HCT 29.1* 29.1*    335       Recent Labs     06/15/24  1654 06/15/24  2359 06/16/24  0639 06/17/24  0546   NA  --   --  132* 131*  131*   K 3.9 3.8  --  4.1   CL  --   --   --  97   CO2  --   --   --  20*   BUN  --   --   --  23*   CREATININE  --   --   --  6.5*   GLU  --   --   --  90   PHOS  --   --  4.0 4.3  4.3   MG  --   --  2.0 2.1          OXYGEN:  Flow (L/min) (Oxygen Therapy): 2  Oxygen Concentration (%): 21       MEWS score: 3    Rounding completed with charge ELISABETH Olivas reports patient febrile overnight 101.2. Given tylenol. WBC stable.  No additional concerns verbalized at this time. Instructed to call [phone] for further concerns or assistance.    Wilian Bustos RN       "

## 2024-06-18 NOTE — CONSULTS
[facility] - Telemetry Stepdown  Infectious Disease  Consult Note    Patient Name: Sarah Saravia  MRN: [id number]  Admission Date: 4/10/2024  Hospital Length of Stay: 69 days  Attending Physician: Virgil Fierro III*  Primary Care Provider: Deanna Primary Doctor       Inpatient consult to Infectious Diseases  Consult performed by: Negra Muro PA-C  Consult ordered by: Jatin Hutchins MD        See ID Progress Note from today for full note and recommendations.    Thank you.

## 2024-06-18 NOTE — NURSING
Nurses Note -- 4 Eyes      6/18/2024   1:24 AM      Skin assessed during: Q Shift Change      [] No Altered Skin Integrity Present    []Prevention Measures Documented      [x] Yes- Altered Skin Integrity Present or Discovered   [] LDA Added if Not in Epic (Describe Wound)   [] New Altered Skin Integrity was Present on Admit and Documented in LDA   [] Wound Image Taken    Wound Care Consulted? No    Attending Nurse:  Nan Eastman RN/Staff Member:  Hina

## 2024-06-18 NOTE — ASSESSMENT & PLAN NOTE
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 Hospitalization was c/b urosepsis, in which pt completed carbapenem course on 4/16. Trach was capped and later pt was decannulated on 4/30 which was successful. Pt remained inpatient while awaiting dispo plans, as well as requiring mgmt for lyte imbalances and dysphagia.   Pt was restarted on antibiotics on 6/10 per primary team as pt began to be tachycardic, slight leukocytosis, and low grade fever. Blood cx negative. RIP negative.  CT CAP w/o contrast was overall unremarkable. Continued with increasing white count and fever and elevated procalcitonin.  Escalated to vanc/meropenem for possible aspiration pneumonia.     Patient completed a course of vancomycin and meropenem with resolution of fevers and leukocytosis (last meropenem dose 6/16). Began having fevers and a leukocytosis again this morning prompting ID consult.       Recommendations:   - Agree with resuming Vancomycin and Meropenem  - Recommend CT C/A/P with contrast to assess for nidus of infection  - UA/ blood cultures/procalcitonin ordered. Spoke with patient's nurse and denies diarrhea.   - Primary team to assess sacrum today and inform ID with any skin changes  - Discussed plan with ID staff and hospital medicine team. ID will follow.

## 2024-06-18 NOTE — ASSESSMENT & PLAN NOTE
53 y.o. Black or  Female ESRD-HD M-W-F at [facility] presents to ED on 4/10/2024 with UTI.    Of note, the patient was initiated on RRT in February 2024 after being admitted with ALVARADO 2/2 iATN due to septic shock. Perm cath placed on 2/29/24. She was transferred to [facility] on 3/12. Deemed ESRD at [facility].  Nephrology consulted for inpatient ESRD-HD management    Assessment:   - HD today  - Continue to monitor intake and output  - Please avoid gadolinium, fleets, phos-based laxatives, NSAIDs  - Dialysis thrice weekly unless more urgent indications arise. Will evaluate RRT requirements Daily.      Lab Results   Component Value Date    FESATURATED 10 (L) 03/15/2024    FERRITIN 414 (H) 03/15/2024       - Goal in ESRD is Hgb of 10-11. Continue EPO.       Secondary hyperparathyroid of renal origin   - phos 4.2 - no indication for phos binders

## 2024-06-18 NOTE — PT/OT/SLP PROGRESS
Speech Language Pathology Treatment    Patient Name:  Sarah Saravia   MRN:  [id number]  Admitting Diagnosis: Sepsis    Recommendations:                 General Recommendations:  Dysphagia therapy and Speech/language therapy  Diet recommendations:  NPO, NPO  Aspiration Precautions:   Pleasure feedings of small open cup sips of water  Continue alternate means of nutrition  HOB to 90 degrees  Monitor for s/s of aspiration  Strict aspiration precautions   General Precautions: Standard, aphasia, aspiration, fall, contact, NPO  Communication strategies:  yes/no questions only and go to room if call light pushed    Assessment:     Sarah Saravia is a 53 y.o. female with an SLP diagnosis of Aphasia, Dysphagia, and Cognitive-Linguistic Impairment. SLP to continue to follow.      Subjective     Spoke with nursing prior to session. Pt found resting in bed upon SLP entry into room.       Pain/Comfort:   None stated    Respiratory Status: Room air    Objective:     Has the patient been evaluated by SLP for swallowing?   Yes  Keep patient NPO? Yes   Current Respiratory Status:        Pt seen for ongoing dysphagia therapy. Pt awake throughout session though no true vocalizations produced this date. HOB elevated for all PO intake. Pt participate in direct swallows of tsp and small open cup sips of water without evidence of airway compromise. During puree trials, pt continued to demonstrate oral holding consistently across 4/4 trials. 3 second bolus prep paired with MOD verbal and tactile cueing was successful in assisting with AP bolus transport on 2/4 trials; on remaining 2 trials, SLP utilized dry spoon presentation to aid in oral phase deficits. Pt ultimately swallowed all puree trials though oral holding lasted up to ~75 seconds. SLP provided education regarding overall impressions, pleasure feedings of ice chips/small open cup sips of water as well as small tastes of pudding/applesauce, and ongoing SLP POC. Pt indicated  understanding but would continue to benefit from ongoing education.     Goals:   Multidisciplinary Problems       SLP Goals          Problem: SLP    Goal Priority Disciplines Outcome   SLP Goal     SLP Progressing   Description: Speech Language Pathology Goals  Updated goals expected to be met by 5/10 (goals remain appropriate 6/11 - reassess on 6/18:  1. Pt will participate in ongoing swallowing assessment to determine if appropriate for PO trials for pleasure.   2. Pt will model single step command x 1 given max cues.   3. Pt will answer simple yes/no q's during a structured receptive language task x 1 given max cues.   4. Pt will phonate purposefully upon command x 1 given max cues.   5. Pt will attempt to vocalize/verbalize during automatic speech task x 1 given max cues.   6. Pt will participate in evaluation of ability to utilize basic communication board.     Goals expected to be met by 5/8:  1. Pt will participate in Modified Barium Swallow Study to determine if safe for oral intake. Goal met/attempted 5/2                               Plan:     Patient to be seen:  3 x/week   Plan of Care expires:  05/31/24  Plan of Care reviewed with:  patient   SLP Follow-Up:  Yes       Discharge recommendations:  Moderate Intensity Therapy     Time Tracking:     SLP Treatment Date:   06/14/24  Speech Start Time:  0940  Speech Stop Time:  0954     Speech Total Time (min):  14 min    Billable Minutes: Treatment Swallowing Dysfunction 14 06/18/2024     Patient/Caregiver provided printed discharge information.

## 2024-06-18 NOTE — ASSESSMENT & PLAN NOTE
On PEG tube.Wound care with PEG dressing changes.   - CT abdomen with PEG in correct location  - TF, advancing to goal of 50

## 2024-06-18 NOTE — PLAN OF CARE
CHRISTINA spoke with Nicholas and informed Yadi that patient is not stable for discharge. Updated 142 sent via fax ([phone]. Will continue to follow.    Sara Loredo RN  Ext [phone]

## 2024-06-18 NOTE — PLAN OF CARE
[facility] - Telemetry Stepdown  Discharge Reassessment    Primary Care Provider: No, Primary Doctor    Expected Discharge Date: 6/19/2024    Reassessment (most recent)       Discharge Reassessment - 06/18/24 1350          Discharge Reassessment    Assessment Type Discharge Planning Reassessment     Did the patient's condition or plan change since previous assessment? Yes     Discharge Plan A New Nursing Home placement - penitentiary care facility   [facility]    Discharge Plan B New Nursing Home placement - penitentiary care facility     DME Needed Upon Discharge  other (see comments)   TBD    Why the patient remains in the hospital Requires continued medical care        Post-Acute Status    Post-Acute Authorization Placement     Post-Acute Placement Status Pending medical clearance/testing     Diaylsis Status Set-up Complete/Auth obtained   [facility]                    Discharge Plan A and Plan B have been determined by review of patient's clinical status, future medical and therapeutic needs, and coverage/benefits for post-acute care in coordination with multidisciplinary team members.     Sara Loredo RN  Ext [phone]

## 2024-06-19 PROBLEM — G93.40 ACUTE ENCEPHALOPATHY: Status: RESOLVED | Noted: 2024-02-03 | Resolved: 2024-06-19

## 2024-06-19 PROBLEM — N30.01 ACUTE CYSTITIS WITH HEMATURIA: Status: RESOLVED | Noted: 2024-04-11 | Resolved: 2024-06-19

## 2024-06-19 PROBLEM — Z93.0 STATUS POST TRACHEOSTOMY: Status: RESOLVED | Noted: 2024-02-23 | Resolved: 2024-06-19

## 2024-06-19 LAB
ALBUMIN SERPL BCP-MCNC: 2.9 G/DL (ref 3.5–5.2)
ALP SERPL-CCNC: 75 U/L (ref 55–135)
ALT SERPL W/O P-5'-P-CCNC: 18 U/L (ref 10–44)
ANION GAP SERPL CALC-SCNC: 12 MMOL/L (ref 8–16)
AST SERPL-CCNC: 24 U/L (ref 10–40)
BASOPHILS # BLD AUTO: 0.08 K/UL (ref 0–0.2)
BASOPHILS NFR BLD: 0.8 % (ref 0–1.9)
BILIRUB SERPL-MCNC: 0.4 MG/DL (ref 0.1–1)
BUN SERPL-MCNC: 6 MG/DL (ref 6–20)
CALCIUM SERPL-MCNC: 9 MG/DL (ref 8.7–10.5)
CHLORIDE SERPL-SCNC: 95 MMOL/L (ref 95–110)
CO2 SERPL-SCNC: 24 MMOL/L (ref 23–29)
CREAT SERPL-MCNC: 2.3 MG/DL (ref 0.5–1.4)
DIFFERENTIAL METHOD BLD: ABNORMAL
EOSINOPHIL # BLD AUTO: 0.4 K/UL (ref 0–0.5)
EOSINOPHIL NFR BLD: 4.3 % (ref 0–8)
ERYTHROCYTE [DISTWIDTH] IN BLOOD BY AUTOMATED COUNT: 16.3 % (ref 11.5–14.5)
EST. GFR  (NO RACE VARIABLE): 24.8 ML/MIN/1.73 M^2
GLUCOSE SERPL-MCNC: 115 MG/DL (ref 70–110)
HBV SURFACE AG SERPL QL IA: NORMAL
HCT VFR BLD AUTO: 32.5 % (ref 37–48.5)
HGB BLD-MCNC: 9.9 G/DL (ref 12–16)
IMM GRANULOCYTES # BLD AUTO: 0.11 K/UL (ref 0–0.04)
IMM GRANULOCYTES NFR BLD AUTO: 1.1 % (ref 0–0.5)
LYMPHOCYTES # BLD AUTO: 1.9 K/UL (ref 1–4.8)
LYMPHOCYTES NFR BLD: 19.4 % (ref 18–48)
MAGNESIUM SERPL-MCNC: 1.9 MG/DL (ref 1.6–2.6)
MCH RBC QN AUTO: 25.3 PG (ref 27–31)
MCHC RBC AUTO-ENTMCNC: 30.5 G/DL (ref 32–36)
MCV RBC AUTO: 83 FL (ref 82–98)
MONOCYTES # BLD AUTO: 1 K/UL (ref 0.3–1)
MONOCYTES NFR BLD: 10.4 % (ref 4–15)
NEUTROPHILS # BLD AUTO: 6.3 K/UL (ref 1.8–7.7)
NEUTROPHILS NFR BLD: 64 % (ref 38–73)
NRBC BLD-RTO: 0 /100 WBC
PHOSPHATE SERPL-MCNC: 1.6 MG/DL (ref 2.7–4.5)
PLATELET # BLD AUTO: 374 K/UL (ref 150–450)
PMV BLD AUTO: 9.5 FL (ref 9.2–12.9)
POTASSIUM SERPL-SCNC: 3.6 MMOL/L (ref 3.5–5.1)
PROCALCITONIN SERPL IA-MCNC: 14.45 NG/ML
PROCALCITONIN SERPL IA-MCNC: 14.46 NG/ML
PROT SERPL-MCNC: 8 G/DL (ref 6–8.4)
RBC # BLD AUTO: 3.92 M/UL (ref 4–5.4)
SODIUM SERPL-SCNC: 131 MMOL/L (ref 136–145)
TB INDURATION 48 - 72 HR READ: NORMAL
TB SKIN TEST 48 - 72 HR READ: NORMAL
WBC # BLD AUTO: 9.86 K/UL (ref 3.9–12.7)

## 2024-06-19 PROCEDURE — 80053 COMPREHEN METABOLIC PANEL: CPT | Performed by: FAMILY MEDICINE

## 2024-06-19 PROCEDURE — 25000003 PHARM REV CODE 250: Performed by: STUDENT IN AN ORGANIZED HEALTH CARE EDUCATION/TRAINING PROGRAM

## 2024-06-19 PROCEDURE — 92526 ORAL FUNCTION THERAPY: CPT

## 2024-06-19 PROCEDURE — 87340 HEPATITIS B SURFACE AG IA: CPT | Performed by: INTERNAL MEDICINE

## 2024-06-19 PROCEDURE — 85025 COMPLETE CBC W/AUTO DIFF WBC: CPT | Performed by: FAMILY MEDICINE

## 2024-06-19 PROCEDURE — 97112 NEUROMUSCULAR REEDUCATION: CPT

## 2024-06-19 PROCEDURE — 84145 PROCALCITONIN (PCT): CPT | Performed by: PHYSICIAN ASSISTANT

## 2024-06-19 PROCEDURE — 25000003 PHARM REV CODE 250: Performed by: FAMILY MEDICINE

## 2024-06-19 PROCEDURE — 27000207 HC ISOLATION

## 2024-06-19 PROCEDURE — 63600175 PHARM REV CODE 636 W HCPCS: Performed by: FAMILY MEDICINE

## 2024-06-19 PROCEDURE — 36415 COLL VENOUS BLD VENIPUNCTURE: CPT

## 2024-06-19 PROCEDURE — 83735 ASSAY OF MAGNESIUM: CPT | Performed by: FAMILY MEDICINE

## 2024-06-19 PROCEDURE — 20600001 HC STEP DOWN PRIVATE ROOM

## 2024-06-19 PROCEDURE — 84100 ASSAY OF PHOSPHORUS: CPT | Performed by: FAMILY MEDICINE

## 2024-06-19 PROCEDURE — 80100014 HC HEMODIALYSIS 1:1

## 2024-06-19 PROCEDURE — 63600175 PHARM REV CODE 636 W HCPCS: Performed by: NURSE PRACTITIONER

## 2024-06-19 PROCEDURE — 25000003 PHARM REV CODE 250

## 2024-06-19 PROCEDURE — 63600175 PHARM REV CODE 636 W HCPCS

## 2024-06-19 PROCEDURE — 99232 SBSQ HOSP IP/OBS MODERATE 35: CPT | Mod: ,,, | Performed by: NURSE PRACTITIONER

## 2024-06-19 PROCEDURE — 92507 TX SP LANG VOICE COMM INDIV: CPT

## 2024-06-19 PROCEDURE — 25000003 PHARM REV CODE 250: Performed by: NURSE PRACTITIONER

## 2024-06-19 PROCEDURE — 99233 SBSQ HOSP IP/OBS HIGH 50: CPT | Mod: ,,, | Performed by: PHYSICIAN ASSISTANT

## 2024-06-19 PROCEDURE — 36415 COLL VENOUS BLD VENIPUNCTURE: CPT | Performed by: PHYSICIAN ASSISTANT

## 2024-06-19 PROCEDURE — 97530 THERAPEUTIC ACTIVITIES: CPT

## 2024-06-19 PROCEDURE — 97535 SELF CARE MNGMENT TRAINING: CPT

## 2024-06-19 PROCEDURE — 84145 PROCALCITONIN (PCT): CPT | Mod: 91

## 2024-06-19 RX ORDER — POLYETHYLENE GLYCOL 3350 17 G/17G
17 POWDER, FOR SOLUTION ORAL 3 TIMES DAILY
Status: DISCONTINUED | OUTPATIENT
Start: 2024-06-19 | End: 2024-07-08 | Stop reason: HOSPADM

## 2024-06-19 RX ADMIN — POLYETHYLENE GLYCOL 3350 17 G: 17 POWDER, FOR SOLUTION ORAL at 09:06

## 2024-06-19 RX ADMIN — ASPIRIN 81 MG CHEWABLE TABLET 81 MG: 81 TABLET CHEWABLE at 09:06

## 2024-06-19 RX ADMIN — POLYETHYLENE GLYCOL 3350 17 G: 17 POWDER, FOR SOLUTION ORAL at 03:06

## 2024-06-19 RX ADMIN — HEPARIN SODIUM 5000 UNITS: 5000 INJECTION INTRAVENOUS; SUBCUTANEOUS at 06:06

## 2024-06-19 RX ADMIN — Medication 500 MG: at 09:06

## 2024-06-19 RX ADMIN — HEPARIN SODIUM 5000 UNITS: 5000 INJECTION INTRAVENOUS; SUBCUTANEOUS at 01:06

## 2024-06-19 RX ADMIN — ERYTHROPOIETIN 6100 UNITS: 10000 INJECTION, SOLUTION INTRAVENOUS; SUBCUTANEOUS at 04:06

## 2024-06-19 RX ADMIN — INSULIN GLARGINE 13 UNITS: 100 INJECTION, SOLUTION SUBCUTANEOUS at 08:06

## 2024-06-19 RX ADMIN — METOPROLOL TARTRATE 25 MG: 25 TABLET, FILM COATED ORAL at 09:06

## 2024-06-19 RX ADMIN — Medication 500 MG: at 08:06

## 2024-06-19 RX ADMIN — PANTOPRAZOLE SODIUM 40 MG: 40 GRANULE, DELAYED RELEASE ORAL at 09:06

## 2024-06-19 RX ADMIN — HEPARIN SODIUM 3000 UNITS: 1000 INJECTION, SOLUTION INTRAVENOUS; SUBCUTANEOUS at 02:06

## 2024-06-19 RX ADMIN — SEVELAMER CARBONATE 0.8 G: 800 POWDER, FOR SUSPENSION ORAL at 09:06

## 2024-06-19 RX ADMIN — MEROPENEM 500 MG: 500 INJECTION INTRAVENOUS at 01:06

## 2024-06-19 RX ADMIN — ATORVASTATIN CALCIUM 40 MG: 40 TABLET, FILM COATED ORAL at 09:06

## 2024-06-19 RX ADMIN — HEPARIN SODIUM 5000 UNITS: 5000 INJECTION INTRAVENOUS; SUBCUTANEOUS at 09:06

## 2024-06-19 RX ADMIN — MEROPENEM 500 MG: 500 INJECTION INTRAVENOUS at 11:06

## 2024-06-19 RX ADMIN — POLYETHYLENE GLYCOL 3350 17 G: 17 POWDER, FOR SOLUTION ORAL at 08:06

## 2024-06-19 RX ADMIN — METOPROLOL TARTRATE 25 MG: 25 TABLET, FILM COATED ORAL at 08:06

## 2024-06-19 NOTE — PT/OT/SLP PROGRESS
Speech Language Pathology Treatment    Patient Name:  Sarah Saravia   MRN:  [id number]  Admitting Diagnosis: Sepsis    Recommendations:                 General Recommendations:  Dysphagia therapy and Speech/language therapy  Diet recommendations:  NPO, NPO  Aspiration Precautions:   Pleasure feedings of ice chips, small cup sips of water, and/or 1/2 tsp bites of pudding/applesauce   Continue with long term alternate means of nutrition  HOB to 90 degrees  Monitor for s/s of aspiration  Strict aspiration precautions   General Precautions: Standard, aspiration, fall, NPO, contact  Communication strategies:  yes/no questions only and go to room if call light pushed    Assessment:     Sarah Saravia is a 53 y.o. female with an SLP diagnosis of Aphasia, Dysphagia, and Cognitive-Linguistic Impairment. SLP to continue to follow.      Subjective     Pt found resting in bed upon SLP entry into room.       Pain/Comfort:  Pain Rating 1:  (none stated; no nonverbal indicators of pain)    Respiratory Status: Room air    Objective:     Has the patient been evaluated by SLP for swallowing?   Yes  Keep patient NPO? Yes   Current Respiratory Status:        Pt seen for ongoing dysphagia therapy. Pt awake throughout session though no true vocalizations produced this date despite automatic speech tasks including counting, humming, and familiar songs paired with model and MAX multi-modality cueing. She followed modeled commands on 2/7 trials Indep and 4-7 given MAX multi-modality cueing in addition to model.     During PO trials of puree, pt continued to demonstrate oral holding consistently across 4/4 trials. 3 second bolus prep paired with MOD verbal and tactile cueing was successful in assisting with AP bolus transport on 1/4 trials. Oral holding worse this session compared to previous with pt notably more distracted this date and required frequent MOD verbal and visual redirection to both PO trials and speech tasks.     SLP  provided education regarding ongoing SLP POC though pt unable to exhibit understanding this date 2/2 severe language deficits. She was left with bed in lowest locked position upon LSP exit.      Goals:   Multidisciplinary Problems       SLP Goals          Problem: SLP    Goal Priority Disciplines Outcome   SLP Goal     SLP Progressing   Description: Speech Pathology Goals  To be met by 7/19/24    1. Pt will participate in ongoing diagnostic dysphagia therapy    2. Pt will answer basic Y/N questions with 50% accuracy given MAX multi-modality cueing  3. Pt will follow single step commands paired with model across 50% of trials   4. Pt will complete automatic speech tasks with 50% accuracy given MAX multi-modality cueing         Speech Language Pathology Goals  Updated goals expected to be met by 5/10 (goals remain appropriate 6/11 - reassess on 6/18:  1. Pt will participate in ongoing swallowing assessment to determine if appropriate for PO trials for pleasure.   2. Pt will model single step command x 1 given max cues.   3. Pt will answer simple yes/no q's during a structured receptive language task x 1 given max cues.   4. Pt will phonate purposefully upon command x 1 given max cues.   5. Pt will attempt to vocalize/verbalize during automatic speech task x 1 given max cues.   6. Pt will participate in evaluation of ability to utilize basic communication board.     Goals expected to be met by 5/8:  1. Pt will participate in Modified Barium Swallow Study to determine if safe for oral intake. Goal met/attempted 5/2                               Plan:     Patient to be seen:  3 x/week   Plan of Care expires:  05/31/24  Plan of Care reviewed with:  patient   SLP Follow-Up:  Yes       Discharge recommendations:  Moderate Intensity Therapy     Time Tracking:     SLP Treatment Date:   06/19/24  Speech Start Time:  1255  Speech Stop Time:  1312     Speech Total Time (min):  17 min    Billable Minutes: Speech Therapy  Individual 9 and Treatment Swallowing Dysfunction 8      06/19/2024

## 2024-06-19 NOTE — PROGRESS NOTES
06/19/24 0200   Vital Signs   Temp 99.4 °F (37.4 °C)   Temp Source Axillary   Pulse 98   Heart Rate Source Monitor   Resp 16   SpO2 99 %   Device (Oxygen Therapy) room air   BP (!) 167/88   MAP (mmHg) 116   BP Location Right arm   BP Method Automatic   Patient Position Lying       Pt is stable for tx.  Bedside HD 1:1 tx initiated aseptically via RIJ TDC without issue.

## 2024-06-19 NOTE — PT/OT/SLP PROGRESS
Physical Therapy Co-Treatment    Patient Name:  Sarah Saravia   MRN:  [id number]    Recommendations:     Discharge Recommendations: Moderate Intensity Therapy  Discharge Equipment Recommendations: hospital bed, lift device, wheelchair  Barriers to discharge:  Increased skilled assistance required    Assessment:   Co-treatment performed due to patient's multiple deficits requiring two skilled therapists to appropriately and safely assess patient's strength, endurance, functional mobility, and ADL performance while facilitating functional tasks in addition to accommodating for patient's activity tolerance and medical acuity.    Sarah Saravia is a 53 y.o. female admitted with a medical diagnosis of Sepsis.  She presents with the following impairments/functional limitations: weakness, pain, impaired self care skills, impaired functional mobility, impaired balance, decreased lower extremity function, decreased upper extremity function, decreased safety awareness, impaired coordination. Patient required increased encouragement to participate in treatment session this date. Of note, she also demonstrated increased pain behaviors associated w/ RLE movement & WB. Assist level for bed mobility still remaining at TotalA of 2 persons. Sit<>stand trials also required TotalA of 2 persons, however patient unable to achieve full ROM. Patient continues to demonstrate the need for moderate intensity therapy on a daily basis exhibited by decreased independence with self-care and functional mobility     Rehab Prognosis: Good; patient would benefit from acute skilled PT services to address these deficits and reach maximum level of function.    Recent Surgery: * No surgery found *      Plan:     During this hospitalization, patient to be seen 3 x/week to address the identified rehab impairments via gait training, therapeutic activities, therapeutic exercises, neuromuscular re-education and progress toward the following goals:    Plan  "of Care Expires:  07/22/24    Subjective     Chief Complaint: Pain   Patient/Family Comments/goals: Patient demonstrating interest in standing trial  Pain/Comfort:  Pain Rating 1: No pain behaviors observed at rest  Location - Side 1: Right  Location - Orientation 1: upper  Location 1: leg  Pain Addressed 1: Reposition, Distraction, Cessation of Activity, Nurse notified  Pain Rating Post-Intervention 1: Increased pain behaviors observed w/ RLE movement & WB    Objective:     Communicated with RN prior to session.  Patient found HOB elevated with PEG Tube, pressure relief boots, peripheral IV, telemetry upon PT entry to room.     General Precautions: Standard, aspiration, fall, NPO, contact   Orthopedic Precautions:N/A   Braces: N/A   Body mass index is 36.43 kg/m².  Oxygen Device: Room Air  Vitals: BP (!) 115/56 (BP Location: Left arm, Patient Position: Lying)   Pulse 98   Temp 97.7 °F (36.5 °C) (Oral)   Resp 16   Ht 5' 7" (1.702 m)   Wt 105.5 kg (232 lb 9.4 oz)   SpO2 97%   BMI 36.43 kg/m²      Functional Mobility:  Bed Mobility: Cuing required for breakdown of complex motor sequence into single steps     Rolling Left: TotalAx2 with HOB Elevated  EOB Scooting:   Anterior: TotalAx2. Pt attempted task on own, however little-no excursion observed  Lateral (L): TotalAx2. VC & facilitation for foot placement  Supine>Sit: x1, TotalAx2 with HOB Elevated  Sit>Supine: x1, TotalAx2 with HOB Flat    Transfers:     Sit<>Stand:   x1, TotalAx2 from Edge of Bed with HHAx2. Unable to clear hips  x1, TotalAx2 from Edge of Bed with HHAx2. Partial rep completed.  Assessment: FFP, impaired hip extension, impaired anterior translation of SAÚL, poor foot placement  VC for foot placement, upright posturing, and glute engagement  Facilitation for B foot positioning & upright posturing    Balance:   Static Sitting: SBA  Static Standing: TotalAx2    AM-PAC 6 CLICK MOBILITY  Turning over in bed (including adjusting bedclothes, sheets " and blankets)?: 2  Sitting down on and standing up from a chair with arms (e.g., wheelchair, bedside commode, etc.): 1  Moving from lying on back to sitting on the side of the bed?: 2  Moving to and from a bed to a chair (including a wheelchair)?: 1  Need to walk in hospital room?: 1  Climbing 3-5 steps with a railing?: 1  Basic Mobility Total Score: 8     Treatment & Education:  Increased patient contact time for:  MD resident present in room to assess skin integrity    Patient Education Provided on:  The role of physical therapy and how the patient can benefit from skilled services  The negative effects of prolonged bed rest/sedentary behavior, along with the importance of OOB activity & patient participation with PT  The importance of contacting RN, via call light, for mobility throughout the day  Pt white board updated with current therapists name and level of mobility assistance needed.     Patient Demonstrated and Signaled understanding of all topics touched on this date. All questions answered within the PT scope of practice    Patient left HOB elevated with  RN notified and MD present..    GOALS:   Multidisciplinary Problems       Physical Therapy Goals          Problem: Physical Therapy    Goal Priority Disciplines Outcome Goal Variances Interventions   Physical Therapy Goal     PT, PT/OT Progressing     Description: Goals to be met by: 24   Goals remain appropriate 5/3/2024 to be met by 24  Goals updated 2024 to be met by 24  Goals updated 24 to be met by 24  Goals remain appropriate 24 to be met by 24  Goals remain appropriate 24 to be met by 24     Patient will increase functional independence with mobility by performin. Supine to sit with Maximum Assistance  2. Sit to supine with Maximum Assistance  3. Rolling to Left and Right with Moderate Assistance.  4. Sitting at edge of bed 5 minutes with Moderate Assistance- MET , monitor  consistency  4a. Sitting at edge of bed 10 minutes with Supervision  5. Lower extremity exercise program x20 reps per handout, with assistance as needed  6. Sit to stand transfer with Maximum Assistance with or without LRAD  7. Stand for 2 minutes with Maximum Assistance with or without LRAD                         Time Tracking:     PT Received On: 06/19/24  PT Start Time: 1003     PT Stop Time: 1029  PT Total Time (min): 26 min     Billable Minutes: Therapeutic Activity 26    Treatment Type: Treatment  PT/PTA: PT     Number of PTA visits since last PT visit: 0     06/19/2024

## 2024-06-19 NOTE — PROGRESS NOTES
06/19/24 0530   Vital Signs   Temp 97.7 °F (36.5 °C)   Temp Source Oral   Pulse 107   Heart Rate Source Monitor   Resp 16   SpO2 100 %   Device (Oxygen Therapy) room air   /77   MAP (mmHg) 88   BP Location Left forearm   BP Method Automatic   Patient Position Lying     HD tx completed and pt tolerated well.  Tx time: 3.5hrs  Net UF: 2L.  Pt is awake and stable. VSS.

## 2024-06-19 NOTE — ASSESSMENT & PLAN NOTE
This patient does have evidence of infective focus. My overall impression is sepsis. Source: Skin and Soft Tissue (location Abdominal). Not requiring pressors. Source control achieved by: vanc/meropenem.    - Concerns septic source may be PEG site with associated purulent wound despite Wound Care attempts to protect with barriers. PEG placed by General Surgery 2/2024. General Surgery consulted regarding PEG site ordered CT abdomen. Currently in place.   - Continue meropenem as patient fevered; Vanc also restarted. Comprehensive skin assessment without skin involvement, perineum sutures still in place, but without signs of infection, removed. CT pan scan w/ IV contrast without evidence of infection  - US extremities negative for thrombus   - ID reconsulted for further assistance

## 2024-06-19 NOTE — PROGRESS NOTES
[facility] - Telemetry Stepdown  Adult Nutrition  Progress Note    SUMMARY       Recommendations    Continue TF regimen of Glucerna 1.5 @ 50 mL/hr - meeting needs.  If Renal formula warranted, rec'd Novasource @ 40 mL/hr = 1920 kcals, 87 g of protein, 688 mL fluid.   RD to monitor & follow-up.    Goals: Meet % EEN, EPN by RD f/u date  Nutrition Goal Status: goal met  Communication of RD Recs: reviewed with RN    Assessment and Plan    Moderate malnutrition    Malnutrition Type:  Context: acute illness or injury  Level: moderate    Related to (etiology):   Inability to consume sufficient energy     Signs and Symptoms (as evidenced by):   Weight loss, NFPE, Edema    Malnutrition Characteristic Summary:  Weight Loss (Malnutrition): 10% in 6 months  Subcutaneous Fat (Malnutrition): mild depletion  Muscle Mass (Malnutrition): mild depletion  Fluid Accumulation (Malnutrition): mild    Interventions/Recommendations (treatment strategy):  Collaboration of nutrition care w/ other providers  EN    Nutrition Diagnosis Status:   Continues     Malnutrition Assessment    Malnutrition Context: acute illness or injury  Malnutrition Level: moderate    Weight Loss (Malnutrition): 10% in 6 months  Subcutaneous Fat (Malnutrition): mild depletion  Muscle Mass (Malnutrition): mild depletion  Fluid Accumulation (Malnutrition): mild     Reason for Assessment    Reason For Assessment: RD follow-up  Diagnosis: other (see comments) (Acute cystitis with hematuria)  Relevant Medical History: CVA, PEG, DM  Interdisciplinary Rounds: did not attend    General Information Comments: Remains NPO; tolerating TFs at goal via PEG. Pt received HD overnight. Moderate malnutrition diagnosis continues; please see PES statement for details.   Nutrition Discharge Planning: Adequate nutrition    Nutrition/Diet History    Spiritual, Cultural Beliefs, Anglican Practices, Values that Affect Care: no  Food Allergies: NKFA  Factors Affecting Nutritional  "Intake: NPO    Anthropometrics    Temp: 97.8 °F (36.6 °C)  Height Method: Stated  Height: 5' 7" (170.2 cm)  Height (inches): 67 in  Weight Method: Bed Scale  Weight: 105.5 kg (232 lb 9.4 oz)  Weight (lb): 232.59 lb  Ideal Body Weight (IBW), Female: 135 lb  % Ideal Body Weight, Female (lb): 172.29 %  BMI (Calculated): 36.4  BMI Grade: 35 - 39.9 - obesity - grade II  Usual Body Weight (UBW), kg: (!) 136.4 kg  % Usual Body Weight: 90  % Weight Change From Usual Weight: -10.19 %    Lab/Procedures/Meds    Pertinent Labs Reviewed: reviewed  Pertinent Labs Comments: Creat 2.3, GFR 24.8, A1C 6.2  Pertinent Medications Reviewed: reviewed  Pertinent Medications Comments: -    Estimated/Assessed Needs    Weight Used For Calorie Calculations: 105.5 kg (232 lb 9.4 oz)    Energy Calorie Requirements (kcal): 2030 kcal/d  Energy Need Method: Anderson-St Jeor (1.2 PAL)    Protein Requirements: 95 g/d (.9 g/kg)  Weight Used For Protein Calculations: 105.5 kg (232 lb 9.4 oz)    Estimated Fluid Requirement Method: other (see comments) (Per MD)  RDA Method (mL): 2030    CHO Requirement: 254g    Nutrition Prescription Ordered    Current Diet Order: NPO  Current Nutrition Support Formula Ordered: Glucerna 1.5  Current Nutrition Support Rate Ordered: 50 mL/hr    Evaluation of Received Nutrient/Fluid Intake    Enteral Calories (kcal): 1800  Enteral Protein (gm): 99  Enteral (Free Water) Fluid (mL): 911  Free Water Flush Fluid (mL): 600    % Kcal Needs: 89%  % Protein Needs: 104%    I/O: -8.2L since 6/15    Energy Calories Required: meeting needs  Protein Required: meeting needs  Fluid Required: other (see comments) (Per MD)    Comments: LBM: 6/18    Tolerance: tolerating    Nutrition Risk    Level of Risk/Frequency of Follow-up:  (1x/week)     Monitor and Evaluation    Food and Nutrient Intake: enteral nutrition intake  Food and Nutrient Adminstration: enteral and parenteral nutrition administration  Physical Activity and Function: " nutrition-related ADLs and IADLs  Anthropometric Measurements: weight, weight change  Biochemical Data, Medical Tests and Procedures: inflammatory profile, lipid profile, glucose/endocrine profile, gastrointestinal profile, electrolyte and renal panel  Nutrition-Focused Physical Findings: overall appearance     Nutrition Follow-Up    RD Follow-up?: Yes

## 2024-06-19 NOTE — ASSESSMENT & PLAN NOTE
53 y.o. Black or  Female ESRD-HD M-W-F at [facility] presents to ED on 4/10/2024 with UTI.    Of note, the patient was initiated on RRT in February 2024 after being admitted with ALVARADO 2/2 iATN due to septic shock. Perm cath placed on 2/29/24. She was transferred to [facility] on 3/12. Deemed ESRD at [facility].  Nephrology consulted for inpatient ESRD-HD management    Assessment:   - HD completed this AM with 2 L removed.   - Continue to monitor intake and output  - Please avoid gadolinium, fleets, phos-based laxatives, NSAIDs  - Dialysis thrice weekly unless more urgent indications arise. Will evaluate RRT requirements Daily.      Lab Results   Component Value Date    FESATURATED 10 (L) 03/15/2024    FERRITIN 414 (H) 03/15/2024       - Goal in ESRD is Hgb of 10-11. Continue EPO.       Secondary hyperparathyroid of renal origin   - no indication for phos binders

## 2024-06-19 NOTE — ASSESSMENT & PLAN NOTE
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 Pt underwent extensive course, currently still healing from this, but no longer has wound vac. Picture in media tabs    - Wound care while inpatient  - NH with wound care orders

## 2024-06-19 NOTE — NURSING
Nurses Note -- 4 Eyes      6/19/2024   7:15 AM      Skin assessed during: Q Shift Change      [] No Altered Skin Integrity Present    []Prevention Measures Documented      [x] Yes- Altered Skin Integrity Present or Discovered   [] LDA Added if Not in Epic (Describe Wound)   [] New Altered Skin Integrity was Present on Admit and Documented in LDA   [] Wound Image Taken    Wound Care Consulted? No    Attending Nurse:  Nan Eastman RN/Staff Member:  Hina

## 2024-06-19 NOTE — PLAN OF CARE
Problem: Infection  Goal: Absence of Infection Signs and Symptoms  Outcome: Progressing     Problem: Skin Injury Risk Increased  Goal: Skin Health and Integrity  Outcome: Progressing     Problem: Adult Inpatient Plan of Care  Goal: Plan of Care Review  Outcome: Progressing     Problem: Chronic Kidney Disease  Goal: Optimal Coping with Chronic Illness  Outcome: Progressing  Goal: Electrolyte Balance  Outcome: Progressing  Goal: Fluid Balance  Outcome: Progressing  Goal: Optimal Functional Ability  Outcome: Progressing  Goal: Absence of Anemia Signs and Symptoms  Outcome: Progressing

## 2024-06-19 NOTE — PLAN OF CARE
Problem: Physical Therapy  Goal: Physical Therapy Goal  Description: Goals to be met by: 24   Goals remain appropriate 5/3/2024 to be met by 24  Goals updated 2024 to be met by 24  Goals updated 24 to be met by 24  Goals remain appropriate 24 to be met by 24  Goals remain appropriate 24 to be met by 24    Patient will increase functional independence with mobility by performin. Supine to sit with Maximum Assistance  2. Sit to supine with Maximum Assistance  3. Rolling to Left and Right with Moderate Assistance.  4. Sitting at edge of bed 5 minutes with Moderate Assistance- MET , monitor consistency  4a. Sitting at edge of bed 10 minutes with Supervision  5. Lower extremity exercise program x20 reps per handout, with assistance as needed  6. Sit to stand transfer with Maximum Assistance with or without LRAD  7. Stand for 2 minutes with Maximum Assistance with or without LRAD    Outcome: Progressing    POC & Goals extended through

## 2024-06-19 NOTE — PLAN OF CARE
Problem: Occupational Therapy  Goal: Occupational Therapy Goal  Description: Goals to be met by: 5/22/24 Goals revised as needed on 05-30 to be met by 06-19:Goals revised and extended to 07-03    Patient will increase functional independence with ADLs by performing:    Revised: UBD with Min A NOT MET  New Goal: UBD seated EOB with Mod A  Revised: Grooming seated EOB with CGA NOT MET  New Goal groom seated EOB with Min A  Revised: Sit EOB x 20 minutes with SBA  MET 06-10-24  Rolling to Bilateral with Moderate Assistance.NOT MET 06-19     Supine to sit with Maximum Assistance. NOT MET 06-19      Outcome: Progressing

## 2024-06-19 NOTE — PROGRESS NOTES
[facility] - Telemetry [facility] Medicine  Progress Note    Patient Name: Sarah Saravia  MRN: [id number]  Patient Class: IP- Inpatient   Admission Date: 4/10/2024  Length of Stay: 70 days  Attending Physician: Virgil Fierro III*  Primary Care Provider: Deanna, Primary Doctor        Subjective:     Principal Problem:Sepsis        HPI:  53 yof with 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 History was given from patient's daughter. She was undergoing dialysis today and noticed she was lethargic, less alert than usual self. Pt completed dialysis and still not acting herself. EMS was called, fever of a 100.  On chart review, she did have an episode of large volume emesis around 1700.  Per EMS, she had a slightly elevated temp 100.0°F, glucose 300s.     In the ED: UA 2+ leuks, >100 WBC, many bacteria, WBC 17, CT abd/pelvis concerning for cystitis. Given vanc/zosyn    Overview/Hospital Course:  53 JARROD admitted to Hospital Medicine service for urosepsis. Vanc/zosyn initiated, found to have staph epi in all 4 bottles, vanc/ertapenem course completed per ID. Vascular Neurology consulted concerning mental status change with the recommendation to initiate ASA 81 QD and to obtain an MRI to r/o new stroke. Per discussion with vascular neurology, MRI findings appear to be expected changes from prior stroke. Nephrology was consulted for regularly scheduled dialysis. Pulm consulted, patient decannulated 4/30. Failed swallow assessment, continuing tube feeds. Ongoing placement difficulties d/t need for accepting HD facility to have sandee lift with 2 personnel to operate. Once patient gets accepted at [facility], next step will be to work with daughter on care home NH placement. SW onboard working on paperwork for long term Medicaid application. H/c c/b new fever, ID reconsulted, CT chest showing bilateral ground glass  opacities, Vanc/meropenem course to be completed 6/17, however patient had further episode of fever and will need continued merem and ID consulted for assistance. CT pan scan to assess for infection.    Interval History: NAEON. Patient without a fever. Comprehensive skin exam today to assess possible missed infection. Overall no evidence of erythema, warmth or skin breakdown including assessment of perineum. Only notable finding was perineum sutures which appear to have stayed since prior debridement from nec fasc infection at prior hospitalization. Sutures appeared clean, dry and intact without evidence of drainage, erythema, or tenderness - sutures removed.     Review of Systems   Unable to perform ROS: Patient nonverbal     Objective:     Vital Signs (Most Recent):  Temp: 97.8 °F (36.6 °C) (06/19/24 1050)  Pulse: 107 (06/19/24 1050)  Resp: 16 (06/19/24 1050)  BP: 115/72 (06/19/24 1050)  SpO2: 99 % (06/19/24 1050) Vital Signs (24h Range):  Temp:  [97.7 °F (36.5 °C)-99.4 °F (37.4 °C)] 97.8 °F (36.6 °C)  Pulse:  [] 107  Resp:  [16-18] 16  SpO2:  [97 %-100 %] 99 %  BP: (100-167)/(58-88) 115/72     Weight: 105.5 kg (232 lb 9.4 oz)  Body mass index is 36.43 kg/m².    Intake/Output Summary (Last 24 hours) at 6/19/2024 1053  Last data filed at 6/19/2024 0530  Gross per 24 hour   Intake 900 ml   Output 2600 ml   Net -1700 ml         Physical Exam  Constitutional:       Appearance: Normal appearance.   HENT:      Head: Normocephalic.      Right Ear: External ear normal.      Left Ear: External ear normal.   Eyes:      Extraocular Movements: Extraocular movements intact.   Cardiovascular:      Rate and Rhythm: Normal rate.      Pulses: Normal pulses.   Pulmonary:      Effort: Pulmonary effort is normal.   Abdominal:      General: Abdomen is flat.      Palpations: Abdomen is soft.   Musculoskeletal:         General: Normal range of motion.   Skin:     General: Skin is warm.      Capillary Refill: Capillary refill takes  less than 2 seconds.   Neurological:      General: No focal deficit present.      Mental Status: She is alert and oriented to person, place, and time.             Significant Labs: All pertinent labs within the past 24 hours have been reviewed.  CBC:   Recent Labs   Lab 06/18/24  0806 06/19/24  0546   WBC 23.87* 9.86   HGB 9.3* 9.9*   HCT 31.2* 32.5*    374     CMP:   Recent Labs   Lab 06/18/24  0806 06/19/24  0546   * 131*   K 4.5 3.6   CL 98 95   CO2 21* 24   * 115*   BUN 12 6   CREATININE 4.4* 2.3*   CALCIUM 9.4 9.0   PROT 7.4 8.0   ALBUMIN 2.7* 2.9*   BILITOT 0.4 0.4   ALKPHOS 74 75   AST 24 24   ALT 24 18   ANIONGAP 14 12       Significant Imaging: I have reviewed all pertinent imaging results/findings within the past 24 hours.    Assessment/Plan:      * Sepsis  This patient does have evidence of infective focus. My overall impression is sepsis. Source: Skin and Soft Tissue (location Abdominal). Not requiring pressors. Source control achieved by: vanc/meropenem.    - Concerns septic source may be PEG site with associated purulent wound despite Wound Care attempts to protect with barriers. PEG placed by General Surgery 2/2024. General Surgery consulted regarding PEG site ordered CT abdomen. Currently in place.   - Continue meropenem as patient fevered; Vanc also restarted. Comprehensive skin assessment without skin involvement, perineum sutures still in place, but without signs of infection, removed. CT pan scan w/ IV contrast without evidence of infection  - US extremities negative for thrombus   - ID reconsulted for further assistance    Vulvovaginal candidiasis  Noted 5/29, given 150mg fluconazole x1      Irritant contact dermatitis due friction or contact with other specified body fluids  Wound care following       Debility  Needs:  Wheelchair: patient has a mobility limitation that significantly impairs her ability to participate in one or more mobility related activities of daily living  (MRADL's) such as toileting, feeding, dressing, grooming, and bathing in customary locations in the home.  The mobility limitation cannot be sufficiently resolved by the use of a cane or walker.   The use of a manual wheelchair will significantly improve the patient's ability to participate in MRADLS and the patient will use it on regular basis in the home.  Family/pt has expressed her willingness to use a manual wheelchair in the home.  She also has a caregiver who is capable of assisting in mobility.    Mrs Saravia requires a hospital bed due to her requiring positioning of the body in ways not feasible with an ordinary bed to alleviate pain/ is completely immobile /or limited mobility and cannot independently make changes in body position without the use of the bed.  The positioning of the body cannot be sufficiently resolved by the use of pillows and wedges    Patient has a mobility limitation that significantly impairs their ability to participate in one or more mobility related activities of daily living, including toileting. This deficit can be resolved by using a bedside commode. Patient demonstrates mobility limitations that will cause them to be confined to one room at home without bathroom access for up to 30 days. Using a bedside commode will greatly improve the patient's ability to participate in MRADLs       Complication of vascular dialysis catheter  Cath intermittently clogging, not resolving with cath-hedy, going for IR venogram 4/30 with possible exchange. S/p exchange with IR on 4/30      Constipation  Stool burden on CT  Uptitrating miralax    Moderate malnutrition  Nutrition consulted. Most recent weight and BMI monitored-     Measurements:  Wt Readings from Last 1 Encounters:   06/11/24 105.5 kg (232 lb 9.4 oz)   Body mass index is 36.43 kg/m².    Patient has been screened and assessed by RD.    Malnutrition Type:  Context: acute illness or injury  Level: moderate    Malnutrition Characteristic  "Summary:  Weight Loss (Malnutrition): 10% in 6 months  Subcutaneous Fat (Malnutrition): mild depletion  Muscle Mass (Malnutrition): mild depletion  Fluid Accumulation (Malnutrition): mild    Interventions/Recommendations (treatment strategy):  - TF  - previous history of failed swallow studies    Prolonged QT interval  Qtc 526 [04/10/24]. Limit Qtc prolonging drugs as able    Spastic hemiplegia of right dominant side as late effect of cerebrovascular disease  Important that patient is able to sit in chair for 4h for dialysis placement needs, even if she requires lift/assistance getting into the chair.This patient has Chronic right hemiplegia due to stroke. Physical therapy services has been scheduled. Continue all standard measures for pressure injury prevention and consult wound care for any wounds (chronic or acute).    ESRD (end stage renal disease) on dialysis  Creatine stable for now. BMP reviewed- noted Estimated Creatinine Clearance: 18.5 mL/min (A) (based on SCr of 4.4 mg/dL (H)). according to latest data. Based on current GFR, CKD stage is end stage.  Monitor UOP and serial BMP and adjust therapy as needed. Renally dose meds. Avoid nephrotoxic medications and procedures.    -- HD MWF (~1L fluid removal on average per session)  -- nephro following  -- Hypophosphatemic on sevelamer. Reached out to nephrology for decrease in phosph binder to improve phosph levels  --SW onboard for placement to [facility] to continue dialysis. Once patient gets accepted at [facility], next step will be to work with daughter on skilled nursing NH placement.     PEG (percutaneous endoscopic gastrostomy) status  On PEG tube.Wound care with PEG dressing changes.   - CT abdomen with PEG in correct location  - TF, advancing to goal of 50        Leukocytosis  See "Sepsis"      Type 2 diabetes mellitus with hyperglycemia, with long-term current use of insulin  Adjusting insulin to account for diabetic TF    Patient's FSGs are controlled " on current medication regimen.  Last A1c reviewed-   Lab Results   Component Value Date    HGBA1C 6.2 (H) 05/27/2024     Most recent fingerstick glucose reviewed-   Recent Labs   Lab 06/18/24  1204 06/18/24  2119   POCTGLUCOSE 148* 129*       Current correctional scale  Medium  Maintain anti-hyperglycemic dose as follows-   Antihyperglycemics (From admission, onward)      Start     Stop Route Frequency Ordered    06/09/24 2100  insulin glargine U-100 (Lantus) pen 13 Units         -- SubQ Nightly 06/09/24 0756    06/09/24 1255  insulin aspart U-100 pen 0-10 Units         -- SubQ Before meals & nightly PRN 06/09/24 1155          Hold Oral hypoglycemics while patient is in the hospital.  POCT glucose checks   Continue tube feeds    Hyponatremia  Patient has hyponatremia which is uncontrolled,We will aim to correct the sodium by 4-6mEq in 24 hours. We will monitor sodium Daily. The hyponatremia is due to ESRD. Discussed with nephrology, dialysate bath adjusted to account for and correct hyponatremia.  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 Pt underwent extensive course, currently still healing from this, but no longer has wound vac. Picture in media tabs    - Wound care while inpatient  - NH with wound care orders        VTE Risk Mitigation (From admission, onward)           Ordered     heparin (porcine) injection 1,000 Units  As needed (PRN)         06/10/24 0035     heparin (porcine) injection 5,000 Units  Every 8 hours         05/29/24 0823     heparin (porcine) injection 3,000 Units  As needed (PRN)         04/17/24 0825                    Discharge Planning   ZEKE: 6/21/2024     Code Status: Full Code   Is the patient medically ready for discharge?: No    Reason for patient still in hospital (select all that apply): Patient trending condition  Discharge Plan A: New Nursing Home placement - nursing home care  facility ([facility])                  Jatin Hutchins MD  Department of Hospital Medicine   [facility] - Telemetry Stepdown

## 2024-06-19 NOTE — NURSING
Nurses Note -- 4 Eyes      6/18/2024   0709 AM      Skin assessed during: Q Shift Change      [] No Altered Skin Integrity Present    []Prevention Measures Documented      [x] Yes- Altered Skin Integrity Present or Discovered   [] LDA Added if Not in Epic (Describe Wound)   [] New Altered Skin Integrity was Present on Admit and Documented in LDA   [] Wound Image Taken    Wound Care Consulted? No    Attending Nurse:  Nan Eastman RN/Staff Member:  Brain Campos

## 2024-06-19 NOTE — NURSING
Nurses Note -- 4 Eyes      6/19/2024   7:27 AM      Skin assessed during: Q Shift Change      [] No Altered Skin Integrity Present    []Prevention Measures Documented      [x] Yes- Altered Skin Integrity Present or Discovered   [] LDA Added if Not in Epic (Describe Wound)   [] New Altered Skin Integrity was Present on Admit and Documented in LDA   [x] Wound Image Taken    Wound Care Consulted? No    Attending Nurse:  Veronica Eastman RN/Staff Member:  Nan

## 2024-06-19 NOTE — PLAN OF CARE
Problem: Infection  Goal: Absence of Infection Signs and Symptoms  Outcome: Not Progressing     Problem: Skin Injury Risk Increased  Goal: Skin Health and Integrity  Outcome: Not Progressing     Problem: Adult Inpatient Plan of Care  Goal: Plan of Care Review  Outcome: Not Progressing     Problem: Chronic Kidney Disease  Goal: Optimal Coping with Chronic Illness  Outcome: Not Progressing  Goal: Electrolyte Balance  Outcome: Not Progressing  Goal: Fluid Balance  Outcome: Not Progressing  Goal: Optimal Functional Ability  Outcome: Not Progressing  Goal: Absence of Anemia Signs and Symptoms  Outcome: Not Progressing  Goal: Optimal Oral Intake  Outcome: Not Progressing  Goal: Acceptable Pain Control  Outcome: Not Progressing  Goal: Minimize Renal Failure Effects  Outcome: Not Progressing   Fever delay discharge to [facility]. Id to bedside for evaluation, Merrem and Vancomycin abx given.

## 2024-06-19 NOTE — PROGRESS NOTES
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 Hospitalization was c/b urosepsis, in which pt completed carbapenem course on 4/16. Trach was capped and later pt was decannulated on 4/30 which was successful. Pt remained inpatient while awaiting dispo plans, as well as requiring mgmt for lyte imbalances and dysphagia.     Pt was restarted on antibiotics on 6/10 per primary team as pt began to be tachycardic with a leukocytosis and low grade fever. Blood cx, RIP negative. CT CAP w/o contrast was overall unremarkable. Continued with increasing white count and fever and elevated procalcitonin.  Escalated to vanc/meropenem for possible aspiration pneumonia. Patient completed a course of vancomycin and meropenem with resolution of fevers and leukocytosis (last meropenem dose 6/16). Began having fevers and a leukocytosis again 6/18 prompting ID consult.     Patient re-started on Vanc/Meropenem. CT C/A/P without acute findings. Blood cx NGTD. No sacral wound development. Nurse denies multiple episodes of diarrhea. Fever and leukocytosis have resolved.    Recommendations:   - Continue Vancomycin and Meropenem for now  - Anticipate 5 days of empiric antibiotics as clinically improving with antibiotic therapy  - ID will follow.         Thank you for the consult. Please secure chat for any questions.  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 Reportedly patient was less responsive than her baseline had an episode of emesis, in the ED she had a CT abdomen/pelvis concerning for cystitis as well as a UA (obtained via straight cath) concerning for a UTI. She was initially started on vancomycin and Zosyn later broadened to meropenem. On presentation her labs were notable for leukocytosis of 17, as well as a lactic acidosis of 3.9 that has improved to 2.4 with the administration of 1 L of IV fluids, we are being cautious with fluid repletion given her end-stage renal disease and oliguric status. ID is now consulted for abrupt increase in WBC to 22 w/ associated fever. Daughter at bedside notes that patient had a few episodes of vomiting 2 days ago after initiation of tube feeds, and feels her breathing pattern is different today. Patient is unable to answer questions. CXR  w/ increased vascular congestion. She has been restarted on broad spectrum abx.   Interval hx:  ID re-consulted 6/18 due to fevers and a leukocytosis after stopping abx.  Blood cx NGTD. UA not obtained yet.  CT C/A/P reviewed and without acute findings.  Patient alert and following few directions. Appears comfortable in no distress.      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    Hypermagnesemia 04/11/2024    POA, Mg 3.2  Daily chem       Morbid obesity     Necrotizing fasciitis        Past Surgical History:   Procedure Laterality Date    CLOSURE OF WOUND Right 2/22/2024    Procedure: CLOSURE, WOUND;  Surgeon: Steve Cole MD;  Location: [facility] OR [address] [place] [address];  Service: General;  Laterality: Right;    ESOPHAGOGASTRODUODENOSCOPY W/ PEG N/A 2/22/2024    Procedure: EGD, WITH PEG TUBE INSERTION;  Surgeon: Steve Cole MD;   Location: [facility] OR 2ND FLR;  Service: General;  Laterality: N/A;    INCISION AND DRAINAGE OF PERIRECTAL REGION N/A 1/29/2024    Procedure: INCISION AND DRAINAGE, PERIRECTAL REGION;  Surgeon: Axel Ramsay MD;  Location: [facility] OR;  Service: General;  Laterality: N/A;    LUMBAR PUNCTURE N/A 2/16/2024    Procedure: Lumbar Puncture;  Surgeon: Macy Boykin;  Location: [facility];  Service: Anesthesiology;  Laterality: N/A;    PLACEMENT, TRIALYSIS CATH Right 1/31/2024    Procedure: INSERTION, CATHETER, TRIPLE LUMEN, HEMODIALYSIS, TEMPORARY;  Surgeon: Alvin Junior MD;  Location: [facility] OR;  Service: General;  Laterality: Right;    REPLACEMENT OF WOUND VACUUM-ASSISTED CLOSURE DEVICE Right 2/12/2024    Procedure: REPLACEMENT, WOUND VAC;  Surgeon: Mundo Carmona MD;  Location: [facility] OR 2ND FLR;  Service: General;  Laterality: Right;    REPLACEMENT OF WOUND VACUUM-ASSISTED CLOSURE DEVICE N/A 2/15/2024    Procedure: REPLACEMENT, WOUND VAC;  Surgeon: Steve Cole MD;  Location: [facility] OR 2ND FLR;  Service: General;  Laterality: N/A;    REPLACEMENT OF WOUND VACUUM-ASSISTED CLOSURE DEVICE Right 2/19/2024    Procedure: REPLACEMENT, WOUND VAC;  Surgeon: Steve Cole MD;  Location: [facility] OR 2ND FLR;  Service: General;  Laterality: Right;  RLE/groin    REPLACEMENT OF WOUND VACUUM-ASSISTED CLOSURE DEVICE Right 2/22/2024    Procedure: REPLACEMENT, WOUND VAC;  Surgeon: Steve Cole MD;  Location: [facility] OR 2ND FLR;  Service: General;  Laterality: Right;    TRACHEOSTOMY N/A 2/22/2024    Procedure: CREATION, TRACHEOSTOMY;  Surgeon: Steve Cole MD;  Location: [facility] OR 2ND FLR;  Service: General;  Laterality: N/A;    WOUND DEBRIDEMENT Bilateral 2/2/2024    Procedure: DEBRIDEMENT, WOUND;  Surgeon: Steve Cole MD;  Location: [facility] OR 2ND FLR;  Service: General;  Laterality: Bilateral;  Bilateral groin  Possible wound vac placement    WOUND DEBRIDEMENT Right 2/6/2024    Procedure: DEBRIDEMENT, WOUND, replace wound vac, possible  closure;  Surgeon: Steve Cole MD;  Location: [facility] OR 2ND FLR;  Service: General;  Laterality: Right;  RLE    WOUND DEBRIDEMENT Right 2/9/2024    Procedure: DEBRIDEMENT, WOUND w wound vac change;  Surgeon: Steve Cole MD;  Location: [facility] OR 2ND FLR;  Service: General;  Laterality: Right;  RLE    WOUND DEBRIDEMENT Right 2/12/2024    Procedure: R thigh wound debridement;  Surgeon: Mundo Carmona MD;  Location: [facility] OR 2ND FLR;  Service: General;  Laterality: Right;    WOUND EXPLORATION Right 1/31/2024    Procedure: IRRIGATION & DEBRIDEMENT, WOUND DEBRIDEMENT;  Surgeon: Alvin Junior MD;  Location: [facility] OR;  Service: General;  Laterality: Right;       Review of patient's allergies indicates:  No Known Allergies    Medications:  Medications Prior to Admission   Medication Sig    ascorbic acid, vitamin C, (VITAMIN C) 500 MG tablet 500 mg by Per G Tube route 2 (two) times daily.    pantoprazole sodium (PANTOPRAZOLE ORAL) 40 mg once daily. Liquid via gtube    [DISCONTINUED] amLODIPine (NORVASC) 10 MG tablet 10 mg by Per G Tube route once daily. 0900    [DISCONTINUED] carvediloL (COREG) 25 MG tablet 25 mg by Per G Tube route 2 (two) times daily with meals.    [DISCONTINUED] chlorhexidine (PERIDEX) 0.12 % solution Use as directed 15 mLs in the mouth or throat 2 (two) times daily.    [DISCONTINUED] heparin sodium,porcine (HEPARIN, PORCINE,) 5,000 unit/mL injection Inject 7,500 Units into the skin every 8 (eight) hours.    [DISCONTINUED] insulin aspart U-100 (NOVOLOG) 100 unit/mL injection Inject 0-10 Units into the skin as needed for High Blood Sugar. Moderate correction dose sliding scale    [DISCONTINUED] insulin detemir U-100 (LEVEMIR) 100 unit/mL injection Inject 25 Units into the skin 2 (two) times a day.    [DISCONTINUED] psyllium (KONSYL) Powd 1 packet by Per G Tube route once daily.    [DISCONTINUED] sevelamer carbonate (RENVELA) 2.4 gram PwPk 2,400 mg by Per G Tube route 3 (three) times daily.     "[DISCONTINUED] zinc sulfate (ZINCATE) 50 mg zinc (220 mg) capsule 220 mg by Per G Tube route once daily.     Antibiotics (From admission, onward)      Start     Stop Route Frequency Ordered    06/18/24 1100  meropenem (MERREM) 500 mg in sodium chloride 0.9 % 100 mL IVPB (MB+)         -- IV Every 12 hours (non-standard times) 06/18/24 0947    06/18/24 1041  vancomycin - pharmacy to dose  (vancomycin IVPB (PEDS and ADULTS))        Placed in "And" Linked Group    -- IV pharmacy to manage frequency 06/18/24 0941          Antifungals (From admission, onward)      None          Antivirals (From admission, onward)      None             Immunization History   Administered Date(s) Administered    PPD Test 08/16/2022, 04/23/2024, 05/16/2024, 05/29/2024, 06/17/2024    Td (ADULT) 11/16/2005       Family History    None       Social History     Socioeconomic History    Marital status: Single   Tobacco Use    Smoking status: Unknown     Social Determinants of Health     Financial Resource Strain: Patient Unable To Answer (3/14/2024)    Overall Financial Resource Strain (CARDIA)     Difficulty of Paying Living Expenses: Patient unable to answer   Recent Concern: Financial Resource Strain - High Risk (3/9/2024)    Overall Financial Resource Strain (CARDIA)     Difficulty of Paying Living Expenses: Very hard   Food Insecurity: Patient Unable To Answer (3/14/2024)    Hunger Vital Sign     Worried About Running Out of Food in the Last Year: Patient unable to answer     Ran Out of Food in the Last Year: Patient unable to answer   Transportation Needs: Patient Unable To Answer (3/14/2024)    PRAPARE - Transportation     Lack of Transportation (Medical): Patient unable to answer     Lack of Transportation (Non-Medical): Patient unable to answer   Physical Activity: Inactive (3/13/2024)    Exercise Vital Sign     Days of Exercise per Week: 0 days     Minutes of Exercise per Session: 0 min   Stress: Patient Unable To Answer (3/14/2024)    " [facility] - Occupational Stress Questionnaire     Feeling of Stress : Patient unable to answer   Housing Stability: Patient Unable To Answer (3/14/2024)    Housing Stability Vital Sign     Unable to Pay for Housing in the Last Year: Patient unable to answer     Number of Places Lived in the Last Year: 1     Unstable Housing in the Last Year: Patient unable to answer   Recent Concern: Housing Stability - High Risk (3/9/2024)    Housing Stability Vital Sign     Unable to Pay for Housing in the Last Year: Yes     Unstable Housing in the Last Year: No     Review of Systems   Unable to perform ROS: Patient nonverbal     Objective:     Vital Signs (Most Recent):  Temp: 97.7 °F (36.5 °C) (06/19/24 1115)  Pulse: 96 (06/19/24 1129)  Resp: 16 (06/19/24 1050)  BP: (!) 115/56 (06/19/24 1115)  SpO2: 98 % (06/19/24 1230) Vital Signs (24h Range):  Temp:  [97.7 °F (36.5 °C)-99.4 °F (37.4 °C)] 97.7 °F (36.5 °C)  Pulse:  [] 96  Resp:  [16-18] 16  SpO2:  [97 %-100 %] 98 %  BP: (100-167)/(56-88) 115/56     Weight: 105.5 kg (232 lb 9.4 oz)  Body mass index is 36.43 kg/m².    Estimated Creatinine Clearance: 35.4 mL/min (A) (based on SCr of 2.3 mg/dL (H)).     Physical Exam  Nursing note reviewed.   Constitutional:       General: She is not in acute distress.     Appearance: She is obese. She is not toxic-appearing.   HENT:      Head: Normocephalic and atraumatic.   Eyes:      General: No scleral icterus.     Conjunctiva/sclera: Conjunctivae normal.   Cardiovascular:      Rate and Rhythm: Regular rhythm. Tachycardia present.   Pulmonary:      Effort: Pulmonary effort is normal. No respiratory distress.   Abdominal:      General: There is no distension.      Palpations: Abdomen is soft.      Comments: G tube   Skin:     General: Skin is warm and dry.      Comments: R chest HD line w/o evidence of infection    Neurological:      Mental Status: She is alert.      Comments: Awake and alert. Following few  commands. Non verbal          Significant Labs: CBC:   Recent Labs   Lab 06/18/24  0806 06/19/24  0546   WBC 23.87* 9.86   HGB 9.3* 9.9*   HCT 31.2* 32.5*    374     CMP:   Recent Labs   Lab 06/18/24  0806 06/19/24  0546   * 131*   K 4.5 3.6   CL 98 95   CO2 21* 24   * 115*   BUN 12 6   CREATININE 4.4* 2.3*   CALCIUM 9.4 9.0   PROT 7.4 8.0   ALBUMIN 2.7* 2.9*   BILITOT 0.4 0.4   ALKPHOS 74 75   AST 24 24   ALT 24 18   ANIONGAP 14 12     Microbiology Results (last 7 days)       Procedure Component Value Units Date/Time    Blood culture [[id number]] Collected: 06/18/24 0806    Order Status: Completed Specimen: Blood from Peripheral, Antecubital, Right Updated: 06/19/24 1012     Blood Culture, Routine No Growth to date      No Growth to date    Blood culture [[id number]] Collected: 06/18/24 0807    Order Status: Completed Specimen: Blood from Peripheral, Hand, Left Updated: 06/19/24 1012     Blood Culture, Routine No Growth to date      No Growth to date    Blood culture [[id number]] Collected: 06/13/24 0622    Order Status: Completed Specimen: Blood Updated: 06/18/24 0812     Blood Culture, Routine No growth after 5 days.    Blood culture [[id number]] Collected: 06/13/24 0622    Order Status: Completed Specimen: Blood Updated: 06/18/24 0812     Blood Culture, Routine No growth after 5 days.    Blood culture [[id number]] Collected: 06/10/24 0719    Order Status: Completed Specimen: Blood Updated: 06/15/24 1012     Blood Culture, Routine No growth after 5 days.    Narrative:      Lft forearm    Blood culture [[id number]] Collected: 06/10/24 0719    Order Status: Completed Specimen: Blood Updated: 06/15/24 1012     Blood Culture, Routine No growth after 5 days.    Narrative:      Lft hand    Respiratory Infection Panel (PCR), Nasopharyngeal [[id number]] Collected: 06/14/24 0922    Order Status: Completed Specimen: Nasopharyngeal Swab Updated: 06/14/24 1117     Respiratory Infection Panel Source NP  Swab     Adenovirus Not Detected     Coronavirus 229E, Common Cold Virus Not Detected     Coronavirus HKU1, Common Cold Virus Not Detected     Coronavirus NL63, Common Cold Virus Not Detected     Coronavirus OC43, Common Cold Virus Not Detected     Comment: The Coronavirus strains detected in this test cause the common cold.  These strains are not the COVID-19 (novel Coronavirus)strain   associated with the respiratory disease outbreak.          SARS-CoV2 (COVID-19) Qualitative PCR Not Detected     Human Metapneumovirus Not Detected     Human Rhinovirus/Enterovirus Not Detected     Influenza A (subtypes H1, H1-2009,H3) Not Detected     Influenza B Not Detected     Parainfluenza Virus 1 Not Detected     Parainfluenza Virus 2 Not Detected     Parainfluenza Virus 3 Not Detected     Parainfluenza Virus 4 Not Detected     Respiratory Syncytial Virus Not Detected     Bordetella Parapertussis ([id number]) Not Detected     Bordetella pertussis (ptxP) Not Detected     Chlamydia pneumoniae Not Detected     Mycoplasma pneumoniae Not Detected    Narrative:      Assay not valid for lower respiratory specimens, alternate  testing required.    Respiratory Infection Panel (PCR), Nasopharyngeal [[id number]]     Order Status: Canceled Specimen: Nasopharyngeal Swab             Significant Imaging: I have reviewed all pertinent imaging results/findings within the past 24 hours.

## 2024-06-19 NOTE — ASSESSMENT & PLAN NOTE
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 Hospitalization was c/b urosepsis, in which pt completed carbapenem course on 4/16. Trach was capped and later pt was decannulated on 4/30 which was successful. Pt remained inpatient while awaiting dispo plans, as well as requiring mgmt for lyte imbalances and dysphagia.     Pt was restarted on antibiotics on 6/10 per primary team as pt began to be tachycardic with a leukocytosis and low grade fever. Blood cx, RIP negative. CT CAP w/o contrast was overall unremarkable. Continued with increasing white count and fever and elevated procalcitonin.  Escalated to vanc/meropenem for possible aspiration pneumonia. Patient completed a course of vancomycin and meropenem with resolution of fevers and leukocytosis (last meropenem dose 6/16). Began having fevers and a leukocytosis again 6/18 prompting ID consult.       Patient re-started on Vanc/Meropenem. CT C/A/P without acute findings. Blood cx NGTD. No sacral wound development. Nurse denies diarrhea. Fever and leukocytosis have resolved.    Recommendations:   - Continue Vancomycin and Meropenem for now  - Please send clean catch urine sample when able  - ID will follow.

## 2024-06-19 NOTE — PT/OT/SLP PROGRESS
Occupational Therapy  Co- Treatment    Name: Sarah Saravia  MRN: [id number]  Admitting Diagnosis:  Sepsis       Recommendations:     Discharge Recommendations: Moderate Intensity Therapy  Discharge Equipment Recommendations:  hospital bed, lift device, wheelchair  Barriers to discharge:  Other (Comment) (requires significant assist)    Assessment:     Sarah Saravia is a 53 y.o. female with a medical diagnosis of Sepsis.  She presents with deficits in functional activities of daily living, aphasia, and mobility. Performance deficits affecting function are weakness, impaired cognition, impaired functional mobility, impaired self care skills, pain. All mobility requires significant assist. At EOB, patient is able to maintain static sitting balance with stand by assist. Patient requires significant assistance with ADLs. Co-treatment with PT for maximal patient participation, safety, and activity tolerance.       Rehab Prognosis:  Good; patient would benefit from acute skilled OT services to address these deficits and reach maximum level of function.       Plan:     Patient to be seen 3 x/week to address the above listed problems via self-care/home management, therapeutic activities, therapeutic exercises  Plan of Care Expires: 07/03/24  Plan of Care Reviewed with: patient    Subjective     Chief Complaint: Pt appeared to have fear or pain while participating in standing attempts  Patient/Family Comments/goals: none stated  Pain/Comfort:  Pain Rating 1:  (wimper verbalization with standing attempts) R hand places on R knee throughout session.     Objective:     PT present for co-treat.     Communicated with: RN and medical care team prior to session.  Patient found supine with PEG Tube, pressure relief boots, peripheral IV upon OT entry to room.    General Precautions: Standard, contact, fall, aspiration, aphasia    Orthopedic Precautions:N/A  Braces: N/A  Respiratory Status: Room air     Occupational Performance:      Bed Mobility:    Patient completed Rolling/Turning to Left with  total assistance and 2 persons  Patient completed Scooting/Bridging with total assistance and 2 persons. Anterior scooting x3. Trace muscle initiation and hand placement by patient for one attempt.   Patient completed Supine to Sit with total assistance and 2 persons  Patient completed Sit to Supine with total assistance and 2 persons  EOB static sitting with stand by assistance.     Functional Mobility/Transfers:  Patient completed Sit <> Stand Transfer with total assistance and of 2 persons  with  no assistive device  x2 trials. Clearance of hips from bed in one trial.   Functional Mobility: not tested    Activities of Daily Living:  Grooming: maximal assistance to apply a small amount of lotion to RLE with strong encouragement      [facility] 6 Click ADL: 9    Treatment & Education:  Patient sat EOB with SBA  Patient followed verbal and physical commands inconsistently  Pt alert throughout the session, seeming more disinterested than prior treatment sessions.  Medical care team present at end of session to examine skin integrity and remove stitches.     Patient left supine with all lines intact, call button in reach, and RN and MD present    GOALS:   Multidisciplinary Problems       Occupational Therapy Goals          Problem: Occupational Therapy    Goal Priority Disciplines Outcome Interventions   Occupational Therapy Goal     OT, PT/OT Progressing    Description: Goals to be met by: 5/22/24 Goals revised as needed on 05-30 to be met by 06-19:Goals revised and extended to 07-03    Patient will increase functional independence with ADLs by performing:    Revised: UBD with Min A NOT MET  New Goal: UBD seated EOB with Mod A  Revised: Grooming seated EOB with CGA NOT MET  New Goal groom seated EOB with Min A  Revised: Sit EOB x 20 minutes with SBA  MET 06-10-24  Rolling to Bilateral with Moderate Assistance.NOT MET 06-19     Supine to sit with Maximum  Assistance. NOT MET 06-19                           Time Tracking:     OT Date of Treatment: 06/19/24  OT Start Time: 1003  OT Stop Time: 1029  OT Total Time (min): 26 min    Billable Minutes:Self Care/Home Management 11 min  Neuromuscular Re-education 15 min    OT/JOSÉ: OT     Number of JOSÉ visits since last OT visit: 0    6/19/2024

## 2024-06-19 NOTE — SUBJECTIVE & OBJECTIVE
Interval hx:  ID re-consulted 6/18 due to fevers and a leukocytosis after stopping abx.  Blood cx NGTD. UA not obtained yet.  CT C/A/P reviewed and without acute findings.  Patient alert and following few directions. Appears comfortable in no distress.      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    Hypermagnesemia 04/11/2024    POA, Mg 3.2  Daily chem       Morbid obesity     Necrotizing fasciitis        Past Surgical History:   Procedure Laterality Date    CLOSURE OF WOUND Right 2/22/2024    Procedure: CLOSURE, WOUND;  Surgeon: Steve Cole MD;  Location: [address];  Service: General;  Laterality: Right;    ESOPHAGOGASTRODUODENOSCOPY W/ PEG N/A 2/22/2024    Procedure: EGD, WITH PEG TUBE INSERTION;  Surgeon: Steve Cole MD;  Location: [address];  Service: General;  Laterality: N/A;    INCISION AND DRAINAGE OF PERIRECTAL REGION N/A 1/29/2024    Procedure: INCISION AND DRAINAGE, PERIRECTAL REGION;  Surgeon: Axel Ramsay MD;  Location: [facility] OR;  Service: General;  Laterality: N/A;    LUMBAR PUNCTURE N/A 2/16/2024    Procedure: Lumbar Puncture;  Surgeon: Macy Boykin;  Location: [facility];  Service: Anesthesiology;  Laterality: N/A;    PLACEMENT, TRIALYSIS CATH Right 1/31/2024    Procedure: INSERTION, CATHETER, TRIPLE LUMEN, HEMODIALYSIS, TEMPORARY;  Surgeon: Alvin Junior MD;  Location: [facility] OR;  Service: General;  Laterality: Right;    REPLACEMENT OF WOUND VACUUM-ASSISTED CLOSURE DEVICE Right 2/12/2024    Procedure: REPLACEMENT, WOUND VAC;  Surgeon: Mundo Carmona MD;  Location: [address];  Service: General;  Laterality: Right;    REPLACEMENT OF WOUND VACUUM-ASSISTED CLOSURE DEVICE N/A 2/15/2024    Procedure: REPLACEMENT, WOUND VAC;  Surgeon: Steve Cole MD;  Location: [address];  Service: General;  Laterality: N/A;    REPLACEMENT OF WOUND VACUUM-ASSISTED CLOSURE DEVICE Right 2/19/2024    Procedure:  REPLACEMENT, WOUND VAC;  Surgeon: Steve Cole MD;  Location: [facility] OR 2ND FLR;  Service: General;  Laterality: Right;  RLE/groin    REPLACEMENT OF WOUND VACUUM-ASSISTED CLOSURE DEVICE Right 2/22/2024    Procedure: REPLACEMENT, WOUND VAC;  Surgeon: Steve Cole MD;  Location: [facility] OR 2ND FLR;  Service: General;  Laterality: Right;    TRACHEOSTOMY N/A 2/22/2024    Procedure: CREATION, TRACHEOSTOMY;  Surgeon: Steve Cole MD;  Location: [facility] OR [facility] FLR;  Service: General;  Laterality: N/A;    WOUND DEBRIDEMENT Bilateral 2/2/2024    Procedure: DEBRIDEMENT, WOUND;  Surgeon: Steve Cole MD;  [facility];  Service: General;  Laterality: Bilateral;  Bilateral groin  Possible wound vac placement    WOUND DEBRIDEMENT Right 2/6/2024    Procedure: DEBRIDEMENT, WOUND, replace wound vac, possible closure;  Surgeon: Steve Cole MD;  [facility];  Service: General;  Laterality: Right;  RLE    WOUND DEBRIDEMENT Right 2/9/2024    Procedure: DEBRIDEMENT, WOUND w wound vac change;  Surgeon: Steve Cole MD;  [facility];  Service: General;  Laterality: Right;  RLE    WOUND DEBRIDEMENT Right 2/12/2024    Procedure: R thigh wound debridement;  Surgeon: Mundo Carmona MD;  [facility];  Service: General;  Laterality: Right;    WOUND EXPLORATION Right 1/31/2024    Procedure: IRRIGATION & DEBRIDEMENT, WOUND DEBRIDEMENT;  Surgeon: Alvin Junior MD;  Location: [facility] OR;  Service: General;  Laterality: Right;       Review of patient's allergies indicates:  No Known Allergies    Medications:  Medications Prior to Admission   Medication Sig    ascorbic acid, vitamin C, (VITAMIN C) 500 MG tablet 500 mg by Per G Tube route 2 (two) times daily.    pantoprazole sodium (PANTOPRAZOLE ORAL) 40 mg once daily. Liquid via gtube    [DISCONTINUED] amLODIPine (NORVASC) 10 MG tablet 10 mg by Per G Tube route once daily. 0900    [DISCONTINUED] carvediloL (COREG) 25 MG tablet 25 mg by  "Per G Tube route 2 (two) times daily with meals.    [DISCONTINUED] chlorhexidine (PERIDEX) 0.12 % solution Use as directed 15 mLs in the mouth or throat 2 (two) times daily.    [DISCONTINUED] heparin sodium,porcine (HEPARIN, PORCINE,) 5,000 unit/mL injection Inject 7,500 Units into the skin every 8 (eight) hours.    [DISCONTINUED] insulin aspart U-100 (NOVOLOG) 100 unit/mL injection Inject 0-10 Units into the skin as needed for High Blood Sugar. Moderate correction dose sliding scale    [DISCONTINUED] insulin detemir U-100 (LEVEMIR) 100 unit/mL injection Inject 25 Units into the skin 2 (two) times a day.    [DISCONTINUED] psyllium (KONSYL) Powd 1 packet by Per G Tube route once daily.    [DISCONTINUED] sevelamer carbonate (RENVELA) 2.4 gram PwPk 2,400 mg by Per G Tube route 3 (three) times daily.    [DISCONTINUED] zinc sulfate (ZINCATE) 50 mg zinc (220 mg) capsule 220 mg by Per G Tube route once daily.     Antibiotics (From admission, onward)      Start     Stop Route Frequency Ordered    06/18/24 1100  meropenem (MERREM) 500 mg in sodium chloride 0.9 % 100 mL IVPB (MB+)         -- IV Every 12 hours (non-standard times) 06/18/24 0947    06/18/24 1041  vancomycin - pharmacy to dose  (vancomycin IVPB (PEDS and ADULTS))        Placed in "And" Linked Group    -- IV pharmacy to manage frequency 06/18/24 0941          Antifungals (From admission, onward)      None          Antivirals (From admission, onward)      None             Immunization History   Administered Date(s) Administered    PPD Test 08/16/2022, 04/23/2024, 05/16/2024, 05/29/2024, 06/17/2024    Td (ADULT) 11/16/2005       Family History    None       Social History     Socioeconomic History    Marital status: Single   Tobacco Use    Smoking status: Unknown     Social Determinants of Health     Financial Resource Strain: Patient Unable To Answer (3/14/2024)    Overall Financial Resource Strain (CARDIA)     Difficulty of Paying Living Expenses: Patient unable " to answer   Recent Concern: Financial Resource Strain - High Risk (3/9/2024)    Overall Financial Resource Strain (CARDIA)     Difficulty of Paying Living Expenses: Very hard   Food Insecurity: Patient Unable To Answer (3/14/2024)    Hunger Vital Sign     Worried About Running Out of Food in the Last Year: Patient unable to answer     Ran Out of Food in the Last Year: Patient unable to answer   Transportation Needs: Patient Unable To Answer (3/14/2024)    PRAPARE - Transportation     Lack of Transportation (Medical): Patient unable to answer     Lack of Transportation (Non-Medical): Patient unable to answer   Physical Activity: Inactive (3/13/2024)    Exercise Vital Sign     Days of Exercise per Week: 0 days     Minutes of Exercise per Session: 0 min   Stress: Patient Unable To Answer (3/14/2024)    [employer] [place] [employer] - Occupational Stress Questionnaire     Feeling of Stress : Patient unable to answer   Housing Stability: Patient Unable To Answer (3/14/2024)    Housing Stability Vital Sign     Unable to Pay for Housing in the Last Year: Patient unable to answer     Number of Places Lived in the Last Year: 1     Unstable Housing in the Last Year: Patient unable to answer   Recent Concern: Housing Stability - High Risk (3/9/2024)    Housing Stability Vital Sign     Unable to Pay for Housing in the Last Year: Yes     Unstable Housing in the Last Year: No     Review of Systems   Unable to perform ROS: Patient nonverbal     Objective:     Vital Signs (Most Recent):  Temp: 97.7 °F (36.5 °C) (06/19/24 1115)  Pulse: 96 (06/19/24 1129)  Resp: 16 (06/19/24 1050)  BP: (!) 115/56 (06/19/24 1115)  SpO2: 98 % (06/19/24 1230) Vital Signs (24h Range):  Temp:  [97.7 °F (36.5 °C)-99.4 °F (37.4 °C)] 97.7 °F (36.5 °C)  Pulse:  [] 96  Resp:  [16-18] 16  SpO2:  [97 %-100 %] 98 %  BP: (100-167)/(56-88) 115/56     Weight: 105.5 kg (232 lb 9.4 oz)  Body mass index is 36.43 kg/m².    Estimated Creatinine  Clearance: 35.4 mL/min (A) (based on SCr of 2.3 mg/dL (H)).     Physical Exam  Nursing note reviewed.   Constitutional:       General: She is not in acute distress.     Appearance: She is obese. She is not toxic-appearing.   HENT:      Head: Normocephalic and atraumatic.   Eyes:      General: No scleral icterus.     Conjunctiva/sclera: Conjunctivae normal.   Cardiovascular:      Rate and Rhythm: Regular rhythm. Tachycardia present.   Pulmonary:      Effort: Pulmonary effort is normal. No respiratory distress.   Abdominal:      General: There is no distension.      Palpations: Abdomen is soft.      Comments: G tube   Skin:     General: Skin is warm and dry.      Comments: R chest HD line w/o evidence of infection    Neurological:      Mental Status: She is alert.      Comments: Awake and alert. Following few commands. Non verbal          Significant Labs: CBC:   Recent Labs   Lab 06/18/24  0806 06/19/24  0546   WBC 23.87* 9.86   HGB 9.3* 9.9*   HCT 31.2* 32.5*    374     CMP:   Recent Labs   Lab 06/18/24  0806 06/19/24  0546   * 131*   K 4.5 3.6   CL 98 95   CO2 21* 24   * 115*   BUN 12 6   CREATININE 4.4* 2.3*   CALCIUM 9.4 9.0   PROT 7.4 8.0   ALBUMIN 2.7* 2.9*   BILITOT 0.4 0.4   ALKPHOS 74 75   AST 24 24   ALT 24 18   ANIONGAP 14 12     Microbiology Results (last 7 days)       Procedure Component Value Units Date/Time    Blood culture [[id number]] Collected: 06/18/24 0806    Order Status: Completed Specimen: Blood from Peripheral, Antecubital, Right Updated: 06/19/24 1012     Blood Culture, Routine No Growth to date      No Growth to date    Blood culture [[id number]] Collected: 06/18/24 0807    Order Status: Completed Specimen: Blood from Peripheral, Hand, Left Updated: 06/19/24 1012     Blood Culture, Routine No Growth to date      No Growth to date    Blood culture [[id number]] Collected: 06/13/24 0622    Order Status: Completed Specimen: Blood Updated: 06/18/24 0812     Blood Culture,  Routine No growth after 5 days.    Blood culture [[id number]] Collected: 06/13/24 0622    Order Status: Completed Specimen: Blood Updated: 06/18/24 0812     Blood Culture, Routine No growth after 5 days.    Blood culture [[id number]] Collected: 06/10/24 0719    Order Status: Completed Specimen: Blood Updated: 06/15/24 1012     Blood Culture, Routine No growth after 5 days.    Narrative:      Lft forearm    Blood culture [[id number]] Collected: 06/10/24 0719    Order Status: Completed Specimen: Blood Updated: 06/15/24 1012     Blood Culture, Routine No growth after 5 days.    Narrative:      Lft hand    Respiratory Infection Panel (PCR), Nasopharyngeal [[id number]] Collected: 06/14/24 0922    Order Status: Completed Specimen: Nasopharyngeal Swab Updated: 06/14/24 1117     Respiratory Infection Panel Source NP Swab     Adenovirus Not Detected     Coronavirus 229E, Common Cold Virus Not Detected     Coronavirus HKU1, Common Cold Virus Not Detected     Coronavirus NL63, Common Cold Virus Not Detected     Coronavirus OC43, Common Cold Virus Not Detected     Comment: The Coronavirus strains detected in this test cause the common cold.  These strains are not the COVID-19 (novel Coronavirus)strain   associated with the respiratory disease outbreak.          SARS-CoV2 (COVID-19) Qualitative PCR Not Detected     Human Metapneumovirus Not Detected     Human Rhinovirus/Enterovirus Not Detected     Influenza A (subtypes H1, H1-2009,H3) Not Detected     Influenza B Not Detected     Parainfluenza Virus 1 Not Detected     Parainfluenza Virus 2 Not Detected     Parainfluenza Virus 3 Not Detected     Parainfluenza Virus 4 Not Detected     Respiratory Syncytial Virus Not Detected     Bordetella Parapertussis (DU6029) Not Detected     Bordetella pertussis (ptxP) Not Detected     Chlamydia pneumoniae Not Detected     Mycoplasma pneumoniae Not Detected    Narrative:      Assay not valid for lower respiratory specimens, alternate  testing  required.    Respiratory Infection Panel (PCR), Nasopharyngeal [[id number]]     Order Status: Canceled Specimen: Nasopharyngeal Swab             Significant Imaging: I have reviewed all pertinent imaging results/findings within the past 24 hours.

## 2024-06-19 NOTE — ASSESSMENT & PLAN NOTE
Patient has hyponatremia which is uncontrolled,We will aim to correct the sodium by 4-6mEq in 24 hours. We will monitor sodium Daily. The hyponatremia is due to ESRD. Discussed with nephrology, dialysate bath adjusted to account for and correct hyponatremia.  Recent Labs   Lab 06/19/24  0546   *

## 2024-06-19 NOTE — PROGRESS NOTES
[facility] - Telemetry Stepdown  Nephrology  Progress Note    Patient Name: Sarah Saravia  MRN: [id number]  Admission Date: 4/10/2024  Hospital Length of Stay: 70 days  Attending Provider: Virgil Fierro III*   Primary Care Physician: Deanna, Primary Doctor  Principal Problem:Sepsis    Subjective:     Interval History: HD completed this AM with 2 L removed. No distress on exam.   ID re consulted due to fevers and leukocytosis.     Review of patient's allergies indicates:  No Known Allergies  Current Facility-Administered Medications   Medication Frequency    acetaminophen oral solution 650 mg Q6H PRN    ascorbic acid (vitamin C) tablet 500 mg BID    aspirin chewable tablet 81 mg Daily    atorvastatin tablet 40 mg Daily    dextrose 10% bolus 125 mL 125 mL PRN    dextrose 10% bolus 250 mL 250 mL PRN    epoetin merry injection 6,100 Units Every Mon, Wed, Fri    glucagon (human recombinant) injection 1 mg PRN    glucose chewable tablet 16 g PRN    glucose chewable tablet 24 g PRN    heparin (porcine) injection 3,000 Units PRN    heparin (porcine) injection 5,000 Units Q8H    hepatitis B virus vacc.rec(PF) injection 20 mcg vaccine x 1 dose    insulin aspart U-100 pen 0-10 Units QID (AC + HS) PRN    insulin glargine U-100 (Lantus) pen 13 Units QHS    meropenem (MERREM) 500 mg in sodium chloride 0.9 % 100 mL IVPB (MB+) Q12H    metoprolol tartrate (LOPRESSOR) tablet 25 mg BID    ondansetron 4 mg/5 mL solution 4 mg Q6H PRN    oxyCODONE 5 mg/5 mL solution 5 mg Q4H PRN    pantoprazole suspension 40 mg Daily    polyethylene glycol packet 17 g Daily    sevelamer carbonate pwpk 0.8 g TID WM    sodium chloride 0.9% bolus 250 mL 250 mL PRN    sodium chloride 0.9% flush 10 mL Q12H PRN    vancomycin - pharmacy to dose pharmacy to manage frequency       Objective:     Vital Signs (Most Recent):  Temp: 97.8 °F (36.6 °C) (06/19/24 0830)  Pulse: 107 (06/19/24 0900)  Resp: 16 (06/19/24 0530)  BP: 115/72 (06/19/24 0900)  SpO2: 99 %  (06/19/24 0830) Vital Signs (24h Range):  Temp:  [97.7 °F (36.5 °C)-99.4 °F (37.4 °C)] 97.8 °F (36.6 °C)  Pulse:  [] 107  Resp:  [16-18] 16  SpO2:  [97 %-100 %] 99 %  BP: (100-167)/(58-88) 115/72     Weight: 105.5 kg (232 lb 9.4 oz) (06/11/24 1330)  Body mass index is 36.43 kg/m².  Body surface area is 2.23 meters squared.    I/O last 3 completed shifts:  In: 910 [Other:600; NG/GT:110; IV Piggyback:200]  Out: 2600 [Other:2600]     Physical Exam  Vitals and nursing note reviewed.   Constitutional:       Appearance: She is obese.   HENT:      Head: Normocephalic and atraumatic.   Cardiovascular:      Rate and Rhythm: Normal rate.   Pulmonary:      Effort: Pulmonary effort is normal.   Abdominal:      General: Bowel sounds are normal. There is distension.      Palpations: Abdomen is soft.   Neurological:      Mental Status: She is alert. Mental status is at baseline.          Significant Labs:  CBC:   Recent Labs   Lab 06/19/24  0546   WBC 9.86   RBC 3.92*   HGB 9.9*   HCT 32.5*      MCV 83   MCH 25.3*   MCHC 30.5*     CMP:   Recent Labs   Lab 06/19/24  0546   *   CALCIUM 9.0   ALBUMIN 2.9*   PROT 8.0   *   K 3.6   CO2 24   CL 95   BUN 6   CREATININE 2.3*   ALKPHOS 75   ALT 18   AST 24   BILITOT 0.4     All labs within the past 24 hours have been reviewed.     Assessment/Plan:     Renal/  ESRD (end stage renal disease) on dialysis  53 y.o. Black or  Female ESRD-HD M-W-F at [facility] presents to ED on 4/10/2024 with UTI.    Of note, the patient was initiated on RRT in February 2024 after being admitted with ALVARADO 2/2 iATN due to septic shock. Perm cath placed on 2/29/24. She was transferred to [facility] on 3/12. Deemed ESRD at [facility].  Nephrology consulted for inpatient ESRD-HD management    Assessment:   - HD completed this AM with 2 L removed.   - Continue to monitor intake and output  - Please avoid gadolinium, fleets, phos-based laxatives, NSAIDs  - Dialysis thrice weekly unless more  urgent indications arise. Will evaluate RRT requirements Daily.      Lab Results   Component Value Date    FESATURATED 10 (L) 03/15/2024    FERRITIN 414 (H) 03/15/2024       - Goal in ESRD is Hgb of 10-11. Continue EPO.       Secondary hyperparathyroid of renal origin   - no indication for phos binders       ID  * Sepsis  - defer to primary team         Thank you for your consult. I will follow-up with patient. Please contact us if you have any additional questions.    Shwetha Gregory, POONAM, FNP-C  Nephrology  [facility] - Telemetry Stepdown

## 2024-06-19 NOTE — SUBJECTIVE & OBJECTIVE
Interval History: HD completed this AM with 2 L removed. No distress on exam.   ID re consulted due to fevers and leukocytosis.     Review of patient's allergies indicates:  No Known Allergies  Current Facility-Administered Medications   Medication Frequency    acetaminophen oral solution 650 mg Q6H PRN    ascorbic acid (vitamin C) tablet 500 mg BID    aspirin chewable tablet 81 mg Daily    atorvastatin tablet 40 mg Daily    dextrose 10% bolus 125 mL 125 mL PRN    dextrose 10% bolus 250 mL 250 mL PRN    epoetin merry injection 6,100 Units Every Mon, Wed, Fri    glucagon (human recombinant) injection 1 mg PRN    glucose chewable tablet 16 g PRN    glucose chewable tablet 24 g PRN    heparin (porcine) injection 3,000 Units PRN    heparin (porcine) injection 5,000 Units Q8H    hepatitis B virus vacc.rec(PF) injection 20 mcg vaccine x 1 dose    insulin aspart U-100 pen 0-10 Units QID (AC + HS) PRN    insulin glargine U-100 (Lantus) pen 13 Units QHS    meropenem (MERREM) 500 mg in sodium chloride 0.9 % 100 mL IVPB (MB+) Q12H    metoprolol tartrate (LOPRESSOR) tablet 25 mg BID    ondansetron 4 mg/5 mL solution 4 mg Q6H PRN    oxyCODONE 5 mg/5 mL solution 5 mg Q4H PRN    pantoprazole suspension 40 mg Daily    polyethylene glycol packet 17 g Daily    sevelamer carbonate pwpk 0.8 g TID WM    sodium chloride 0.9% bolus 250 mL 250 mL PRN    sodium chloride 0.9% flush 10 mL Q12H PRN    vancomycin - pharmacy to dose pharmacy to manage frequency       Objective:     Vital Signs (Most Recent):  Temp: 97.8 °F (36.6 °C) (06/19/24 0830)  Pulse: 107 (06/19/24 0900)  Resp: 16 (06/19/24 0530)  BP: 115/72 (06/19/24 0900)  SpO2: 99 % (06/19/24 0830) Vital Signs (24h Range):  Temp:  [97.7 °F (36.5 °C)-99.4 °F (37.4 °C)] 97.8 °F (36.6 °C)  Pulse:  [] 107  Resp:  [16-18] 16  SpO2:  [97 %-100 %] 99 %  BP: (100-167)/(58-88) 115/72     Weight: 105.5 kg (232 lb 9.4 oz) (06/11/24 1330)  Body mass index is 36.43 kg/m².  Body surface area is  2.23 meters squared.    I/O last 3 completed shifts:  In: 910 [Other:600; NG/GT:110; IV Piggyback:200]  Out: 2600 [Other:2600]     Physical Exam  Vitals and nursing note reviewed.   Constitutional:       Appearance: She is obese.   HENT:      Head: Normocephalic and atraumatic.   Cardiovascular:      Rate and Rhythm: Normal rate.   Pulmonary:      Effort: Pulmonary effort is normal.   Abdominal:      General: Bowel sounds are normal. There is distension.      Palpations: Abdomen is soft.   Neurological:      Mental Status: She is alert. Mental status is at baseline.          Significant Labs:  CBC:   Recent Labs   Lab 06/19/24  0546   WBC 9.86   RBC 3.92*   HGB 9.9*   HCT 32.5*      MCV 83   MCH 25.3*   MCHC 30.5*     CMP:   Recent Labs   Lab 06/19/24  0546   *   CALCIUM 9.0   ALBUMIN 2.9*   PROT 8.0   *   K 3.6   CO2 24   CL 95   BUN 6   CREATININE 2.3*   ALKPHOS 75   ALT 18   AST 24   BILITOT 0.4     All labs within the past 24 hours have been reviewed.

## 2024-06-19 NOTE — ASSESSMENT & PLAN NOTE
Adjusting insulin to account for diabetic TF    Patient's FSGs are controlled on current medication regimen.  Last A1c reviewed-   Lab Results   Component Value Date    HGBA1C 6.2 (H) 05/27/2024     Most recent fingerstick glucose reviewed-   Recent Labs   Lab 06/18/24  1204 06/18/24  2119   POCTGLUCOSE 148* 129*       Current correctional scale  Medium  Maintain anti-hyperglycemic dose as follows-   Antihyperglycemics (From admission, onward)    Start     Stop Route Frequency Ordered    06/09/24 2100  insulin glargine U-100 (Lantus) pen 13 Units         -- SubQ Nightly 06/09/24 0756    06/09/24 1255  insulin aspart U-100 pen 0-10 Units         -- SubQ Before meals & nightly PRN 06/09/24 1155        Hold Oral hypoglycemics while patient is in the hospital.  POCT glucose checks   Continue tube feeds

## 2024-06-19 NOTE — PLAN OF CARE
Problem: SLP  Goal: SLP Goal  Description: Speech Pathology Goals  To be met by 7/19/24    1. Pt will participate in ongoing diagnostic dysphagia therapy    2. Pt will answer basic Y/N questions with 50% accuracy given MAX multi-modality cueing  3. Pt will follow single step commands paired with model across 50% of trials   4. Pt will complete automatic speech tasks with 50% accuracy given MAX multi-modality cueing         Speech Language Pathology Goals  Updated goals expected to be met by 5/10 (goals remain appropriate 6/11 - reassess on 6/18:  1. Pt will participate in ongoing swallowing assessment to determine if appropriate for PO trials for pleasure.   2. Pt will model single step command x 1 given max cues.   3. Pt will answer simple yes/no q's during a structured receptive language task x 1 given max cues.   4. Pt will phonate purposefully upon command x 1 given max cues.   5. Pt will attempt to vocalize/verbalize during automatic speech task x 1 given max cues.   6. Pt will participate in evaluation of ability to utilize basic communication board.     Goals expected to be met by 5/8:  1. Pt will participate in Modified Barium Swallow Study to determine if safe for oral intake. Goal met/attempted 5/2          Outcome: Progressing     POC extended, goals revised

## 2024-06-19 NOTE — SUBJECTIVE & OBJECTIVE
Interval History: NAEON. Patient without a fever. Comprehensive skin exam today to assess possible missed infection. Overall no evidence of erythema, warmth or skin breakdown including assessment of perineum. Only notable finding was perineum sutures which appear to have stayed since prior debridement from nec fasc infection at prior hospitalization. Sutures appeared clean, dry and intact without evidence of drainage, erythema, or tenderness - sutures removed.     Review of Systems   Unable to perform ROS: Patient nonverbal     Objective:     Vital Signs (Most Recent):  Temp: 97.8 °F (36.6 °C) (06/19/24 1050)  Pulse: 107 (06/19/24 1050)  Resp: 16 (06/19/24 1050)  BP: 115/72 (06/19/24 1050)  SpO2: 99 % (06/19/24 1050) Vital Signs (24h Range):  Temp:  [97.7 °F (36.5 °C)-99.4 °F (37.4 °C)] 97.8 °F (36.6 °C)  Pulse:  [] 107  Resp:  [16-18] 16  SpO2:  [97 %-100 %] 99 %  BP: (100-167)/(58-88) 115/72     Weight: 105.5 kg (232 lb 9.4 oz)  Body mass index is 36.43 kg/m².    Intake/Output Summary (Last 24 hours) at 6/19/2024 1053  Last data filed at 6/19/2024 0530  Gross per 24 hour   Intake 900 ml   Output 2600 ml   Net -1700 ml         Physical Exam  Constitutional:       Appearance: Normal appearance.   HENT:      Head: Normocephalic.      Right Ear: External ear normal.      Left Ear: External ear normal.   Eyes:      Extraocular Movements: Extraocular movements intact.   Cardiovascular:      Rate and Rhythm: Normal rate.      Pulses: Normal pulses.   Pulmonary:      Effort: Pulmonary effort is normal.   Abdominal:      General: Abdomen is flat.      Palpations: Abdomen is soft.   Musculoskeletal:         General: Normal range of motion.   Skin:     General: Skin is warm.      Capillary Refill: Capillary refill takes less than 2 seconds.   Neurological:      General: No focal deficit present.      Mental Status: She is alert and oriented to person, place, and time.             Significant Labs: All pertinent labs  within the past 24 hours have been reviewed.  CBC:   Recent Labs   Lab 06/18/24  0806 06/19/24  0546   WBC 23.87* 9.86   HGB 9.3* 9.9*   HCT 31.2* 32.5*    374     CMP:   Recent Labs   Lab 06/18/24  0806 06/19/24  0546   * 131*   K 4.5 3.6   CL 98 95   CO2 21* 24   * 115*   BUN 12 6   CREATININE 4.4* 2.3*   CALCIUM 9.4 9.0   PROT 7.4 8.0   ALBUMIN 2.7* 2.9*   BILITOT 0.4 0.4   ALKPHOS 74 75   AST 24 24   ALT 24 18   ANIONGAP 14 12       Significant Imaging: I have reviewed all pertinent imaging results/findings within the past 24 hours.

## 2024-06-20 PROBLEM — M48.02 CERVICAL STENOSIS OF SPINAL CANAL: Status: ACTIVE | Noted: 2024-06-20

## 2024-06-20 LAB
ALBUMIN SERPL BCP-MCNC: 2.7 G/DL (ref 3.5–5.2)
ALP SERPL-CCNC: 70 U/L (ref 55–135)
ALT SERPL W/O P-5'-P-CCNC: 14 U/L (ref 10–44)
ANION GAP SERPL CALC-SCNC: 12 MMOL/L (ref 8–16)
AST SERPL-CCNC: 18 U/L (ref 10–40)
BASOPHILS # BLD AUTO: 0.05 K/UL (ref 0–0.2)
BASOPHILS NFR BLD: 0.5 % (ref 0–1.9)
BILIRUB SERPL-MCNC: 0.3 MG/DL (ref 0.1–1)
BUN SERPL-MCNC: 17 MG/DL (ref 6–20)
CALCIUM SERPL-MCNC: 9.6 MG/DL (ref 8.7–10.5)
CHLORIDE SERPL-SCNC: 96 MMOL/L (ref 95–110)
CO2 SERPL-SCNC: 25 MMOL/L (ref 23–29)
CREAT SERPL-MCNC: 4.7 MG/DL (ref 0.5–1.4)
DIFFERENTIAL METHOD BLD: ABNORMAL
EOSINOPHIL # BLD AUTO: 0.5 K/UL (ref 0–0.5)
EOSINOPHIL NFR BLD: 5.3 % (ref 0–8)
ERYTHROCYTE [DISTWIDTH] IN BLOOD BY AUTOMATED COUNT: 16.1 % (ref 11.5–14.5)
EST. GFR  (NO RACE VARIABLE): 10.5 ML/MIN/1.73 M^2
GLUCOSE SERPL-MCNC: 96 MG/DL (ref 70–110)
HCT VFR BLD AUTO: 29.8 % (ref 37–48.5)
HGB BLD-MCNC: 8.8 G/DL (ref 12–16)
IMM GRANULOCYTES # BLD AUTO: 0.06 K/UL (ref 0–0.04)
IMM GRANULOCYTES NFR BLD AUTO: 0.6 % (ref 0–0.5)
LYMPHOCYTES # BLD AUTO: 2.1 K/UL (ref 1–4.8)
LYMPHOCYTES NFR BLD: 21.7 % (ref 18–48)
MAGNESIUM SERPL-MCNC: 2.1 MG/DL (ref 1.6–2.6)
MCH RBC QN AUTO: 24.6 PG (ref 27–31)
MCHC RBC AUTO-ENTMCNC: 29.5 G/DL (ref 32–36)
MCV RBC AUTO: 84 FL (ref 82–98)
MONOCYTES # BLD AUTO: 1.3 K/UL (ref 0.3–1)
MONOCYTES NFR BLD: 13.9 % (ref 4–15)
NEUTROPHILS # BLD AUTO: 5.5 K/UL (ref 1.8–7.7)
NEUTROPHILS NFR BLD: 58 % (ref 38–73)
NRBC BLD-RTO: 0 /100 WBC
PHOSPHATE SERPL-MCNC: 3.4 MG/DL (ref 2.7–4.5)
PLATELET # BLD AUTO: 429 K/UL (ref 150–450)
PMV BLD AUTO: 9.5 FL (ref 9.2–12.9)
POCT GLUCOSE: 106 MG/DL (ref 70–110)
POCT GLUCOSE: 110 MG/DL (ref 70–110)
POTASSIUM SERPL-SCNC: 3.9 MMOL/L (ref 3.5–5.1)
PROT SERPL-MCNC: 7.3 G/DL (ref 6–8.4)
RBC # BLD AUTO: 3.57 M/UL (ref 4–5.4)
SODIUM SERPL-SCNC: 133 MMOL/L (ref 136–145)
WBC # BLD AUTO: 9.45 K/UL (ref 3.9–12.7)

## 2024-06-20 PROCEDURE — 63600175 PHARM REV CODE 636 W HCPCS

## 2024-06-20 PROCEDURE — 25000003 PHARM REV CODE 250

## 2024-06-20 PROCEDURE — 36415 COLL VENOUS BLD VENIPUNCTURE: CPT

## 2024-06-20 PROCEDURE — 97530 THERAPEUTIC ACTIVITIES: CPT | Mod: CQ

## 2024-06-20 PROCEDURE — 27000207 HC ISOLATION

## 2024-06-20 PROCEDURE — 99233 SBSQ HOSP IP/OBS HIGH 50: CPT | Mod: ,,, | Performed by: PHYSICIAN ASSISTANT

## 2024-06-20 PROCEDURE — 84100 ASSAY OF PHOSPHORUS: CPT

## 2024-06-20 PROCEDURE — 63600175 PHARM REV CODE 636 W HCPCS: Performed by: FAMILY MEDICINE

## 2024-06-20 PROCEDURE — 20600001 HC STEP DOWN PRIVATE ROOM

## 2024-06-20 PROCEDURE — 85025 COMPLETE CBC W/AUTO DIFF WBC: CPT

## 2024-06-20 PROCEDURE — 25000003 PHARM REV CODE 250: Performed by: FAMILY MEDICINE

## 2024-06-20 PROCEDURE — 92507 TX SP LANG VOICE COMM INDIV: CPT

## 2024-06-20 PROCEDURE — 25000003 PHARM REV CODE 250: Performed by: STUDENT IN AN ORGANIZED HEALTH CARE EDUCATION/TRAINING PROGRAM

## 2024-06-20 PROCEDURE — 92526 ORAL FUNCTION THERAPY: CPT

## 2024-06-20 PROCEDURE — 80053 COMPREHEN METABOLIC PANEL: CPT

## 2024-06-20 PROCEDURE — 83735 ASSAY OF MAGNESIUM: CPT

## 2024-06-20 RX ORDER — SODIUM CHLORIDE 9 MG/ML
INJECTION, SOLUTION INTRAVENOUS ONCE
Status: COMPLETED | OUTPATIENT
Start: 2024-06-21 | End: 2024-06-21

## 2024-06-20 RX ADMIN — Medication 500 MG: at 08:06

## 2024-06-20 RX ADMIN — HEPARIN SODIUM 5000 UNITS: 5000 INJECTION INTRAVENOUS; SUBCUTANEOUS at 05:06

## 2024-06-20 RX ADMIN — ATORVASTATIN CALCIUM 40 MG: 40 TABLET, FILM COATED ORAL at 09:06

## 2024-06-20 RX ADMIN — ASPIRIN 81 MG CHEWABLE TABLET 81 MG: 81 TABLET CHEWABLE at 09:06

## 2024-06-20 RX ADMIN — MEROPENEM 500 MG: 500 INJECTION INTRAVENOUS at 11:06

## 2024-06-20 RX ADMIN — HEPARIN SODIUM 5000 UNITS: 5000 INJECTION INTRAVENOUS; SUBCUTANEOUS at 02:06

## 2024-06-20 RX ADMIN — POLYETHYLENE GLYCOL 3350 17 G: 17 POWDER, FOR SOLUTION ORAL at 08:06

## 2024-06-20 RX ADMIN — HEPARIN SODIUM 5000 UNITS: 5000 INJECTION INTRAVENOUS; SUBCUTANEOUS at 09:06

## 2024-06-20 RX ADMIN — POLYETHYLENE GLYCOL 3350 17 G: 17 POWDER, FOR SOLUTION ORAL at 02:06

## 2024-06-20 RX ADMIN — POLYETHYLENE GLYCOL 3350 17 G: 17 POWDER, FOR SOLUTION ORAL at 09:06

## 2024-06-20 RX ADMIN — INSULIN GLARGINE 13 UNITS: 100 INJECTION, SOLUTION SUBCUTANEOUS at 08:06

## 2024-06-20 RX ADMIN — METOPROLOL TARTRATE 25 MG: 25 TABLET, FILM COATED ORAL at 09:06

## 2024-06-20 RX ADMIN — METOPROLOL TARTRATE 25 MG: 25 TABLET, FILM COATED ORAL at 08:06

## 2024-06-20 RX ADMIN — MEROPENEM 500 MG: 500 INJECTION INTRAVENOUS at 10:06

## 2024-06-20 RX ADMIN — Medication 500 MG: at 09:06

## 2024-06-20 RX ADMIN — PANTOPRAZOLE SODIUM 40 MG: 40 GRANULE, DELAYED RELEASE ORAL at 09:06

## 2024-06-20 NOTE — PROGRESS NOTES
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 History was given from patient's daughter. She was undergoing dialysis today and noticed she was lethargic, less alert than usual self. Pt completed dialysis and still not acting herself. EMS was called, fever of a 100.  On chart review, she did have an episode of large volume emesis around 1700.  Per EMS, she had a slightly elevated temp 100.0°F, glucose 300s.     In the ED: UA 2+ leuks, >100 WBC, many bacteria, WBC 17, CT abd/pelvis concerning for cystitis. Given vanc/zosyn    Overview/Hospital Course:  53 JARROD admitted to Hospital Medicine service for urosepsis. Vanc/zosyn initiated, found to have staph epi in all 4 bottles, vanc/ertapenem course completed per ID. Vascular Neurology consulted concerning mental status change with the recommendation to initiate ASA 81 QD and to obtain an MRI to r/o new stroke. Per discussion with vascular neurology, MRI findings appear to be expected changes from prior stroke. Nephrology was consulted for regularly scheduled dialysis. Pulm consulted, patient decannulated 4/30. Failed swallow assessment, continuing tube feeds. Ongoing placement difficulties d/t need for accepting HD facility to have sandee lift with 2 personnel to operate. Once patient gets accepted at [facility], next step will be to work with daughter on skilled nursing NH placement. SW onboard working on paperwork for long term Medicaid application. H/c c/b new fever, ID reconsulted, CT chest showing bilateral ground glass opacities,  Vanc/meropenem course to be completed 6/17, however patient had further episode of fever and will need continued merem and ID consulted for assistance. CT pan scan to assess for infection. Imaging without signs of infection, patient to complete empiric abx for 5 days. Patient ok to discharge and complete abx outpatient    Interval History: NAEON. Patient has no further episodes of fever. Patient to complete 5 days of empiric abx.    Review of Systems   Constitutional:  Negative for fever.   HENT:  Negative for sore throat.    Respiratory:  Negative for cough and shortness of breath.    Cardiovascular:  Negative for chest pain and leg swelling.   Gastrointestinal:  Negative for diarrhea, nausea and vomiting.   Genitourinary:  Negative for dysuria and urgency.   Skin:  Negative for rash.   Neurological:  Negative for seizures and headaches.   Psychiatric/Behavioral:  Negative for confusion.      Objective:     Vital Signs (Most Recent):  Temp: 98.7 °F (37.1 °C) (06/20/24 0715)  Pulse: 88 (06/20/24 0715)  Resp: 18 (06/20/24 0355)  BP: (!) 163/76 (06/20/24 0715)  SpO2: 98 % (06/20/24 0715) Vital Signs (24h Range):  Temp:  [97.7 °F (36.5 °C)-98.9 °F (37.2 °C)] 98.7 °F (37.1 °C)  Pulse:  [] 88  Resp:  [16-18] 18  SpO2:  [97 %-100 %] 98 %  BP: (115-163)/(56-84) 163/76     Weight: 105.5 kg (232 lb 9.4 oz)  Body mass index is 36.43 kg/m².  No intake or output data in the 24 hours ending 06/20/24 0804      Physical Exam  Constitutional:       Appearance: Normal appearance.   HENT:      Head: Normocephalic and atraumatic.      Right Ear: External ear normal.      Left Ear: External ear normal.   Eyes:      Extraocular Movements: Extraocular movements intact.   Cardiovascular:      Rate and Rhythm: Normal rate.      Pulses: Normal pulses.      Heart sounds: Normal heart sounds.   Pulmonary:      Effort: Pulmonary effort is normal.   Abdominal:      Palpations: Abdomen is soft.   Musculoskeletal:         General: Normal  range of motion.   Skin:     General: Skin is warm.      Capillary Refill: Capillary refill takes less than 2 seconds.   Neurological:      Mental Status: She is alert. Mental status is at baseline.             Significant Labs: All pertinent labs within the past 24 hours have been reviewed.  CBC:   Recent Labs   Lab 06/18/24  0806 06/19/24  0546   WBC 23.87* 9.86   HGB 9.3* 9.9*   HCT 31.2* 32.5*    374     CMP:   Recent Labs   Lab 06/18/24  0806 06/19/24  0546   * 131*   K 4.5 3.6   CL 98 95   CO2 21* 24   * 115*   BUN 12 6   CREATININE 4.4* 2.3*   CALCIUM 9.4 9.0   PROT 7.4 8.0   ALBUMIN 2.7* 2.9*   BILITOT 0.4 0.4   ALKPHOS 74 75   AST 24 24   ALT 24 18   ANIONGAP 14 12       Significant Imaging: I have reviewed all pertinent imaging results/findings within the past 24 hours.    Assessment/Plan:      * Sepsis  This patient does have evidence of infective focus. My overall impression is sepsis. Source: Skin and Soft Tissue (location Abdominal). Not requiring pressors. Source control achieved by: vanc/meropenem.    - Concerns septic source may be PEG site with associated purulent wound despite Wound Care attempts to protect with barriers. PEG placed by General Surgery 2/2024. General Surgery consulted regarding PEG site ordered CT abdomen. Currently in place.   - Continue meropenem/vanc for empiric 5 days course. Comprehensive skin assessment without skin involvement, perineum sutures still in place, but without signs of infection, removed. CT pan scan w/ IV contrast without evidence of infection  - US extremities negative for thrombus   - ID reconsulted for further assistance    Cervical stenosis of spinal canal  Outpatient MRI and NSGY f/up      Vulvovaginal candidiasis  Noted 5/29, given 150mg fluconazole x1      Irritant contact dermatitis due friction or contact with other specified body fluids  Wound care following       Debility  Needs:  Wheelchair: patient has a mobility limitation that  significantly impairs her ability to participate in one or more mobility related activities of daily living (MRADL's) such as toileting, feeding, dressing, grooming, and bathing in customary locations in the home.  The mobility limitation cannot be sufficiently resolved by the use of a cane or walker.   The use of a manual wheelchair will significantly improve the patient's ability to participate in MRADLS and the patient will use it on regular basis in the home.  Family/pt has expressed her willingness to use a manual wheelchair in the home.  She also has a caregiver who is capable of assisting in mobility.    Mrs Saravia requires a hospital bed due to her requiring positioning of the body in ways not feasible with an ordinary bed to alleviate pain/ is completely immobile /or limited mobility and cannot independently make changes in body position without the use of the bed.  The positioning of the body cannot be sufficiently resolved by the use of pillows and wedges    Patient has a mobility limitation that significantly impairs their ability to participate in one or more mobility related activities of daily living, including toileting. This deficit can be resolved by using a bedside commode. Patient demonstrates mobility limitations that will cause them to be confined to one room at home without bathroom access for up to 30 days. Using a bedside commode will greatly improve the patient's ability to participate in MRADLs       Complication of vascular dialysis catheter  Cath intermittently clogging, not resolving with cath-hedy, going for IR venogram 4/30 with possible exchange. S/p exchange with IR on 4/30      Constipation  Stool burden on CT  Uptitrating miralax    Moderate malnutrition  Nutrition consulted. Most recent weight and BMI monitored-     Measurements:  Wt Readings from Last 1 Encounters:   06/11/24 105.5 kg (232 lb 9.4 oz)   Body mass index is 36.43 kg/m².    Patient has been screened and assessed by  "RD.    Malnutrition Type:  Context: acute illness or injury  Level: moderate    Malnutrition Characteristic Summary:  Weight Loss (Malnutrition): 10% in 6 months  Subcutaneous Fat (Malnutrition): mild depletion  Muscle Mass (Malnutrition): mild depletion  Fluid Accumulation (Malnutrition): mild    Interventions/Recommendations (treatment strategy):  - TF  - previous history of failed swallow studies    Prolonged QT interval  Qtc 526 [04/10/24]. Limit Qtc prolonging drugs as able    Spastic hemiplegia of right dominant side as late effect of cerebrovascular disease  Important that patient is able to sit in chair for 4h for dialysis placement needs, even if she requires lift/assistance getting into the chair.This patient has Chronic right hemiplegia due to stroke. Physical therapy services has been scheduled. Continue all standard measures for pressure injury prevention and consult wound care for any wounds (chronic or acute).    ESRD (end stage renal disease) on dialysis  Creatine stable for now. BMP reviewed- noted Estimated Creatinine Clearance: 18.5 mL/min (A) (based on SCr of 4.4 mg/dL (H)). according to latest data. Based on current GFR, CKD stage is end stage.  Monitor UOP and serial BMP and adjust therapy as needed. Renally dose meds. Avoid nephrotoxic medications and procedures.    -- HD MWF (~1L fluid removal on average per session)  -- nephro following  -- Hypophosphatemic on sevelamer. Reached out to nephrology for decrease in phosph binder to improve phosph levels  --SW onboard for placement to [facility] to continue dialysis. Once patient gets accepted at [facility] [place], next step will be to work with daughter on [facility] NH placement.     PEG (percutaneous endoscopic gastrostomy) status  On PEG tube.Wound care with PEG dressing changes.   - CT abdomen with PEG in correct location  - TF, advancing to goal of 50        Leukocytosis  See "Sepsis"      Type 2 diabetes mellitus with hyperglycemia, with " "long-term current use of insulin  Adjusting insulin to account for diabetic TF    Patient's FSGs are controlled on current medication regimen.  Last A1c reviewed-   Lab Results   Component Value Date    HGBA1C 6.2 (H) 05/27/2024     Most recent fingerstick glucose reviewed-   Recent Labs   Lab 06/19/24  2126   POCTGLUCOSE 106       Current correctional scale  Medium  Maintain anti-hyperglycemic dose as follows-   Antihyperglycemics (From admission, onward)      Start     Stop Route Frequency Ordered    06/09/24 2100  insulin glargine U-100 (Lantus) pen 13 Units         -- SubQ Nightly 06/09/24 0756    06/09/24 1255  insulin aspart U-100 pen 0-10 Units         -- SubQ Before meals & nightly PRN 06/09/24 1155          Hold Oral hypoglycemics while patient is in the hospital.  POCT glucose checks   Continue tube feeds    Hyponatremia  Patient has hyponatremia which is uncontrolled,We will aim to correct the sodium by 4-6mEq in 24 hours. We will monitor sodium Daily. The hyponatremia is due to ESRD. Discussed with nephrology, dialysate bath adjusted to account for and correct hyponatremia.  No results for input(s): "NA" in the last 24 hours.      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 Pt underwent extensive course, currently still healing from this, but no longer has wound vac. Picture in media tabs    - Wound care while inpatient  - NH with wound care orders        VTE Risk Mitigation (From admission, onward)           Ordered     heparin (porcine) injection 1,000 Units  As needed (PRN)         06/10/24 0035     heparin (porcine) injection 5,000 Units  Every 8 hours         05/29/24 0823     heparin (porcine) injection 3,000 Units  As needed (PRN)         04/17/24 0825                    Discharge Planning   ZEKE: 6/21/2024     Code Status: Full Code   Is the patient medically ready for discharge?: No    Reason for patient still in hospital (select all that apply): " Patient trending condition  Discharge Plan A: New Nursing Home placement - [facility] care facility ([facility])                  Jatin Hutchins MD  Department of Hospital Medicine   [facility] - Tele[facility]

## 2024-06-20 NOTE — PLAN OF CARE
Problem: Infection  Goal: Absence of Infection Signs and Symptoms  Outcome: Progressing     Problem: Skin Injury Risk Increased  Goal: Skin Health and Integrity  Outcome: Progressing     Problem: Adult Inpatient Plan of Care  Goal: Plan of Care Review  Outcome: Progressing     Problem: Chronic Kidney Disease  Goal: Optimal Coping with Chronic Illness  Outcome: Progressing  Goal: Electrolyte Balance  Outcome: Progressing  Goal: Fluid Balance  Outcome: Progressing  Goal: Optimal Functional Ability  Outcome: Progressing  Goal: Absence of Anemia Signs and Symptoms  Outcome: Progressing  Goal: Optimal Oral Intake  Outcome: Progressing  Goal: Acceptable Pain Control  Outcome: Progressing  Goal: Minimize Renal Failure Effects  Outcome: Progressing

## 2024-06-20 NOTE — PT/OT/SLP PROGRESS
"Speech Language Pathology Treatment    Patient Name:  Sarah Saravia   MRN:  [id number]  Admitting Diagnosis: Sepsis    Recommendations:                 General Recommendations:  Dysphagia therapy and Speech/language therapy  Diet recommendations:  NPO, NPO  Aspiration Precautions:   Pleasure feedings of ice chips, small cup sips of water, and/or 1/2 tsp bites of pudding/applesauce   Continue with long term alternate means of nutrition  HOB to 90 degrees  Monitor for s/s of aspiration  Strict aspiration precautions   General Precautions: Standard, aspiration, fall, NPO, contact  Communication strategies:  yes/no questions only and go to room if call light pushed  Assessment:     Sarah Saravia is a 53 y.o. female with an SLP diagnosis of Aphasia, Dysphagia, and Cognitive-Linguistic Impairment.      Subjective     Pt awake/alert watching TV upon SLP entry.     Pt vocalized "yeah" x1 in response to a simple yes/no question about her environment.     Pain/Comfort:  Pain Rating 1: 0/10  Pain Rating Post-Intervention 1: 0/10    Respiratory Status: Room air    Objective:     Has the patient been evaluated by SLP for swallowing?   Yes  Keep patient NPO? Yes       Pt seen for ongoing dysphagia and speech therapy. HOB elevated for safety precautions prior to the start of PO trials. Unable to assess vocal quality 2/2 pt is non-speaking. SLP presented PO trials of tsp sips of water x4, cup sips of water x7, and small bites of puree x4. Evidence of oral holding with tsp sips of water and small bites of puree. Pt observed to orally hold tsp sips of water in her mouth for ~7 seconds prior to initiating a pharyngeals swallow given mod verbal cues. During the first PO trial of puree, SLP provided mod verbal cues and dry spoon presentations. However, pt observed to orally hold the bolus for ~ 2 minutes prior to initiating the pharyngeal swallow. In subsequent PO trials of puree, pt held bolus ~45-48 seconds. Evidence of mild " "lingual residue observed following last PO trial of puree. Liquid wash x2 slightly effective in clearing the residue. Pt presents with a fairly timely initiation of the pharyngeal swallow with cup sips of water. No overt signs/symptoms of aspiration observed with all consistencies.     To target receptive language, SLP provided models for simple commands. Pt followed 2/9 commands IND increasing to 5/9 commands given a model and max multi-modal cues. SLP engaged pt in automatic speech tasks, however pt with no vocalizations. Decreased sustained attention evident during session requiring cues for redirection. Pt easily distracted by noise in the hallway and attempting to scratch her arm multiple times during structured tasks. She was observed to grab the spoon from the clinician to assist with scratching her arm. Toward the end of the session, pt stated "yeah" in response to the SLP asking if she wanted the TV on. Pt with clear vocal quality with decreased intensity. SLP provided education on continued recs for NPO status, pleasure feeds, s/s of aspiration, aspiration precautions, and POC. Pt unable to demonstrate understanding 2/2 severe expressive/receptive language deficits. Upon exiting the room, pt remained upright in bed with all needs met and no report of pain.      Goals:   Multidisciplinary Problems       SLP Goals          Problem: SLP    Goal Priority Disciplines Outcome   SLP Goal     SLP Progressing   Description: Speech Pathology Goals  To be met by 7/19/24    1. Pt will participate in ongoing diagnostic dysphagia therapy    2. Pt will answer basic Y/N questions with 50% accuracy given MAX multi-modality cueing  3. Pt will follow single step commands paired with model across 50% of trials   4. Pt will complete automatic speech tasks with 50% accuracy given MAX multi-modality cueing         Speech Language Pathology Goals  Updated goals expected to be met by 5/10 (goals remain appropriate 6/11 - reassess " on 6/18:  1. Pt will participate in ongoing swallowing assessment to determine if appropriate for PO trials for pleasure.   2. Pt will model single step command x 1 given max cues.   3. Pt will answer simple yes/no q's during a structured receptive language task x 1 given max cues.   4. Pt will phonate purposefully upon command x 1 given max cues.   5. Pt will attempt to vocalize/verbalize during automatic speech task x 1 given max cues.   6. Pt will participate in evaluation of ability to utilize basic communication board.     Goals expected to be met by 5/8:  1. Pt will participate in Modified Barium Swallow Study to determine if safe for oral intake. Goal met/attempted 5/2                               Plan:     Patient to be seen:  3 x/week   Plan of Care expires:  05/31/24  Plan of Care reviewed with:  patient   SLP Follow-Up:  Yes       Discharge recommendations:  Moderate Intensity Therapy   Barriers to Discharge:  None    Time Tracking:     SLP Treatment Date:   06/20/24  Speech Start Time:  0939  Speech Stop Time:  0958     Speech Total Time (min):  19 min    Billable Minutes: Speech Therapy Individual 9 and Treatment Swallowing Dysfunction 10    06/20/2024    Aristides Guillermo CF-SLP

## 2024-06-20 NOTE — SUBJECTIVE & OBJECTIVE
Interval History: NAEON. Patient has no further episodes of fever. Patient to complete 5 days of empiric abx.    Review of Systems   Constitutional:  Negative for fever.   HENT:  Negative for sore throat.    Respiratory:  Negative for cough and shortness of breath.    Cardiovascular:  Negative for chest pain and leg swelling.   Gastrointestinal:  Negative for diarrhea, nausea and vomiting.   Genitourinary:  Negative for dysuria and urgency.   Skin:  Negative for rash.   Neurological:  Negative for seizures and headaches.   Psychiatric/Behavioral:  Negative for confusion.      Objective:     Vital Signs (Most Recent):  Temp: 98.7 °F (37.1 °C) (06/20/24 0715)  Pulse: 88 (06/20/24 0715)  Resp: 18 (06/20/24 0355)  BP: (!) 163/76 (06/20/24 0715)  SpO2: 98 % (06/20/24 0715) Vital Signs (24h Range):  Temp:  [97.7 °F (36.5 °C)-98.9 °F (37.2 °C)] 98.7 °F (37.1 °C)  Pulse:  [] 88  Resp:  [16-18] 18  SpO2:  [97 %-100 %] 98 %  BP: (115-163)/(56-84) 163/76     Weight: 105.5 kg (232 lb 9.4 oz)  Body mass index is 36.43 kg/m².  No intake or output data in the 24 hours ending 06/20/24 0804      Physical Exam  Constitutional:       Appearance: Normal appearance.   HENT:      Head: Normocephalic and atraumatic.      Right Ear: External ear normal.      Left Ear: External ear normal.   Eyes:      Extraocular Movements: Extraocular movements intact.   Cardiovascular:      Rate and Rhythm: Normal rate.      Pulses: Normal pulses.      Heart sounds: Normal heart sounds.   Pulmonary:      Effort: Pulmonary effort is normal.   Abdominal:      Palpations: Abdomen is soft.   Musculoskeletal:         General: Normal range of motion.   Skin:     General: Skin is warm.      Capillary Refill: Capillary refill takes less than 2 seconds.   Neurological:      Mental Status: She is alert. Mental status is at baseline.             Significant Labs: All pertinent labs within the past 24 hours have been reviewed.  CBC:   Recent Labs   Lab  06/18/24  0806 06/19/24  0546   WBC 23.87* 9.86   HGB 9.3* 9.9*   HCT 31.2* 32.5*    374     CMP:   Recent Labs   Lab 06/18/24  0806 06/19/24  0546   * 131*   K 4.5 3.6   CL 98 95   CO2 21* 24   * 115*   BUN 12 6   CREATININE 4.4* 2.3*   CALCIUM 9.4 9.0   PROT 7.4 8.0   ALBUMIN 2.7* 2.9*   BILITOT 0.4 0.4   ALKPHOS 74 75   AST 24 24   ALT 24 18   ANIONGAP 14 12       Significant Imaging: I have reviewed all pertinent imaging results/findings within the past 24 hours.

## 2024-06-20 NOTE — PROGRESS NOTES
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    Hypermagnesemia 04/11/2024    POA, Mg 3.2  Daily chem       Morbid obesity     Necrotizing fasciitis        Social History     Socioeconomic History    Marital status: Single   Tobacco Use    Smoking status: Unknown     Social Determinants of Health     Financial Resource Strain: Patient Unable To Answer (3/14/2024)    Overall Financial Resource Strain (CARDIA)     Difficulty of Paying Living Expenses: Patient unable to answer   Recent Concern: Financial Resource Strain - High Risk (3/9/2024)    Overall Financial Resource Strain (CARDIA)     Difficulty of Paying Living Expenses: Very hard   Food Insecurity: Patient Unable To Answer (3/14/2024)    Hunger Vital Sign     Worried About Running Out of Food in the Last Year: Patient unable to answer     Ran Out of Food in the Last Year: Patient unable to answer   Transportation Needs: Patient Unable To Answer (3/14/2024)    PRAPARE - Transportation     Lack of Transportation (Medical): Patient unable to answer     Lack of Transportation (Non-Medical): Patient unable to answer   Physical Activity: Inactive (3/13/2024)    Exercise Vital Sign     Days of Exercise per Week: 0 days     Minutes of Exercise per Session: 0 min   Stress: Patient Unable To Answer (3/14/2024)    [employer] [place] [employer] - Occupational Stress Questionnaire     Feeling of Stress : Patient unable to answer   Housing Stability: Patient Unable To Answer (3/14/2024)    Housing Stability Vital Sign     Unable to Pay for Housing in the Last Year: Patient unable to answer     Number of Places Lived in the Last Year: 1     Unstable Housing in the Last Year: Patient unable to answer   Recent Concern: Housing Stability - High Risk (3/9/2024)     Housing Stability Vital Sign     Unable to Pay for Housing in the Last Year: Yes     Unstable Housing in the Last Year: No       Precautions:     Allergies as of 04/10/2024    (No Known Allergies)       WO Assessment Details/Treatment     Patient seen for FU PEG site check.    Reviewed chart for this encounter.   See Flow Sheet for findings.    RECOMMENDATIONS:  PEG site care every other day as follows: 1)clean with normal saline and pat dry. 2)cut a 4x4 Mepilex lite thin foam dressing in half and then cut again to make in form of drain sponge. 3)place beneath the external bumper at insertion site- no tape needed to secure.      Discussed POC with patient- who is nonverbal- but maintain good eye contact.  See EMR for orders & patient education.    Bedside nursing to continue care & monitoring.  Bedside nursing to maintain pressure injury prevention interventions.     06/20/24 1630   WOCN Assessment   WOCN Total Time (mins) 30   Visit Date 06/20/24   Visit Time 1630   Consult Type Follow Up   WOCN Speciality Wound   Intervention assessed;changed;chart review;orders   Teaching on-going  (pt- with good eye contact though nonverbal. explained continued care.)   Positioning   Body Position turned   Head of Bed (HOB) Positioning HOB elevated   Positioning/Transfer Devices pillows   Pressure Injury Prevention    Check Moisture Management Pad Done   Heel protection technique Heel boot   Check Medical Devices Done     Photo taken for reference:          Will continue to follow as needed and/ or as directed until discharge.    Niki TIWARIN, RN, CWOCN  06/20/2024

## 2024-06-20 NOTE — NURSING
Nurses Note -- 4 Eyes      6/20/2024   6:34 AM      Skin assessed during: Q Shift Change      [] No Altered Skin Integrity Present    []Prevention Measures Documented      [x] Yes- Altered Skin Integrity Present or Discovered   [] LDA Added if Not in Epic (Describe Wound)   [] New Altered Skin Integrity was Present on Admit and Documented in LDA   [] Wound Image Taken    Wound Care Consulted? No    Attending Nurse:  Nan Eastman RN/Staff Member:  Hina

## 2024-06-20 NOTE — ASSESSMENT & PLAN NOTE
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 Hospitalization was c/b urosepsis, in which pt completed carbapenem course on 4/16. Trach was capped and later pt was decannulated on 4/30 which was successful. Pt remained inpatient while awaiting dispo plans, as well as requiring mgmt for lyte imbalances and dysphagia.     Pt was restarted on antibiotics on 6/10 per primary team as pt began to be tachycardic with a leukocytosis and low grade fever. Blood cx, RIP negative. CT CAP w/o contrast was overall unremarkable. Continued with increasing white count and fever and elevated procalcitonin.  Escalated to vanc/meropenem for possible aspiration pneumonia. Patient completed a course of vancomycin and meropenem with resolution of fevers and leukocytosis (last meropenem dose 6/16). Began having fevers and a leukocytosis again 6/18 prompting ID consult.       Patient re-started on Vanc/Meropenem. CT C/A/P without acute findings. Blood cx NGTD. No sacral wound development. Nurse denies multiple episodes of diarrhea. Fever and leukocytosis resolved quickly.    Recommendations:   - Continue Vancomycin and Meropenem x 5 days   - ID will sign off.

## 2024-06-20 NOTE — SUBJECTIVE & OBJECTIVE
Interval hx:  ID re-consulted 6/18 due to fevers and a leukocytosis after stopping abx. Vanc/meropenem restarted.  Blood cx NGTD. CT C/A/P reviewed and without acute findings. No sacral wound.     6/20 - fevers and leukocytosis resolved. Appears comfortable in no distress.      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    Hypermagnesemia 04/11/2024    POA, Mg 3.2  Daily chem       Morbid obesity     Necrotizing fasciitis        Past Surgical History:   Procedure Laterality Date    CLOSURE OF WOUND Right 2/22/2024    Procedure: CLOSURE, WOUND;  Surgeon: Steve Cole MD;  Location: [facility] OR [address] [place] [address];  Service: General;  Laterality: Right;    ESOPHAGOGASTRODUODENOSCOPY W/ PEG N/A 2/22/2024    Procedure: EGD, WITH PEG TUBE INSERTION;  Surgeon: Steve Cole MD;  Location: [facility] OR [address] [place] [address];  Service: General;  Laterality: N/A;    INCISION AND DRAINAGE OF PERIRECTAL REGION N/A 1/29/2024    Procedure: INCISION AND DRAINAGE, PERIRECTAL REGION;  Surgeon: Axel Ramsay MD;  Location: [facility] OR;  Service: General;  Laterality: N/A;    LUMBAR PUNCTURE N/A 2/16/2024    Procedure: Lumbar Puncture;  Surgeon: Macy Boykin;  Location: [facility];  Service: Anesthesiology;  Laterality: N/A;    PLACEMENT, TRIALYSIS CATH Right 1/31/2024    Procedure: INSERTION, CATHETER, TRIPLE LUMEN, HEMODIALYSIS, TEMPORARY;  Surgeon: Alvin Junior MD;  Location: [facility] OR;  Service: General;  Laterality: Right;    REPLACEMENT OF WOUND VACUUM-ASSISTED CLOSURE DEVICE Right 2/12/2024    Procedure: REPLACEMENT, WOUND VAC;  Surgeon: Mundo Carmona MD;  Location: [facility] OR [address] [place] [address];  Service: General;  Laterality: Right;    REPLACEMENT OF WOUND VACUUM-ASSISTED CLOSURE DEVICE N/A 2/15/2024    Procedure: REPLACEMENT, WOUND VAC;  Surgeon: Steve Cole MD;  Location: [facility] OR [address] [place] [address];  Service: General;  Laterality: N/A;    REPLACEMENT OF WOUND VACUUM-ASSISTED CLOSURE DEVICE Right 2/19/2024     Procedure: REPLACEMENT, WOUND VAC;  Surgeon: Setve Cole MD;  Location: [facility] OR [facility];  Service: General;  Laterality: Right;  RLE/groin    REPLACEMENT OF WOUND VACUUM-ASSISTED CLOSURE DEVICE Right 2/22/2024    Procedure: REPLACEMENT, WOUND VAC;  Surgeon: Steve Cole MD;  [facility];  Service: General;  Laterality: Right;    TRACHEOSTOMY N/A 2/22/2024    Procedure: CREATION, TRACHEOSTOMY;  Surgeon: Steve Cole MD;  [facility];  Service: General;  Laterality: N/A;    WOUND DEBRIDEMENT Bilateral 2/2/2024    Procedure: DEBRIDEMENT, WOUND;  Surgeon: Steve Cole MD;  [facility];  Service: General;  Laterality: Bilateral;  Bilateral groin  Possible wound vac placement    WOUND DEBRIDEMENT Right 2/6/2024    Procedure: DEBRIDEMENT, WOUND, replace wound vac, possible closure;  Surgeon: Steve Cole MD;  [facility];  Service: General;  Laterality: Right;  RLE    WOUND DEBRIDEMENT Right 2/9/2024    Procedure: DEBRIDEMENT, WOUND w wound vac change;  Surgeon: Steve Cole MD;  [facility];  Service: General;  Laterality: Right;  RLE    WOUND DEBRIDEMENT Right 2/12/2024    Procedure: R thigh wound debridement;  Surgeon: Mundo Carmona MD;  [facility];  Service: General;  Laterality: Right;    WOUND EXPLORATION Right 1/31/2024    Procedure: IRRIGATION & DEBRIDEMENT, WOUND DEBRIDEMENT;  Surgeon: Alvin Junior MD;  Location: [facility];  Service: General;  Laterality: Right;       Review of patient's allergies indicates:  No Known Allergies    Medications:  Medications Prior to Admission   Medication Sig    ascorbic acid, vitamin C, (VITAMIN C) 500 MG tablet 500 mg by Per G Tube route 2 (two) times daily.    pantoprazole sodium (PANTOPRAZOLE ORAL) 40 mg once daily. Liquid via gtube    [DISCONTINUED] amLODIPine (NORVASC) 10 MG tablet 10 mg by Per G Tube route once daily. 0900    [DISCONTINUED] carvediloL (COREG) 25 MG  "tablet 25 mg by Per G Tube route 2 (two) times daily with meals.    [DISCONTINUED] chlorhexidine (PERIDEX) 0.12 % solution Use as directed 15 mLs in the mouth or throat 2 (two) times daily.    [DISCONTINUED] heparin sodium,porcine (HEPARIN, PORCINE,) 5,000 unit/mL injection Inject 7,500 Units into the skin every 8 (eight) hours.    [DISCONTINUED] insulin aspart U-100 (NOVOLOG) 100 unit/mL injection Inject 0-10 Units into the skin as needed for High Blood Sugar. Moderate correction dose sliding scale    [DISCONTINUED] insulin detemir U-100 (LEVEMIR) 100 unit/mL injection Inject 25 Units into the skin 2 (two) times a day.    [DISCONTINUED] psyllium (KONSYL) Powd 1 packet by Per G Tube route once daily.    [DISCONTINUED] sevelamer carbonate (RENVELA) 2.4 gram PwPk 2,400 mg by Per G Tube route 3 (three) times daily.    [DISCONTINUED] zinc sulfate (ZINCATE) 50 mg zinc (220 mg) capsule 220 mg by Per G Tube route once daily.     Antibiotics (From admission, onward)      Start     Stop Route Frequency Ordered    06/18/24 1100  meropenem (MERREM) 500 mg in sodium chloride 0.9 % 100 mL IVPB (MB+)         -- IV Every 12 hours (non-standard times) 06/18/24 0947    06/18/24 1041  vancomycin - pharmacy to dose  (vancomycin IVPB (PEDS and ADULTS))        Placed in "And" Linked Group    -- IV pharmacy to manage frequency 06/18/24 0941          Antifungals (From admission, onward)      None          Antivirals (From admission, onward)      None             Immunization History   Administered Date(s) Administered    PPD Test 08/16/2022, 04/23/2024, 05/16/2024, 05/29/2024, 06/17/2024    Td (ADULT) 11/16/2005       Family History    None       Social History     Socioeconomic History    Marital status: Single   Tobacco Use    Smoking status: Unknown     Social Determinants of Health     Financial Resource Strain: Patient Unable To Answer (3/14/2024)    Overall Financial Resource Strain (CARDIA)     Difficulty of Paying Living Expenses: " Patient unable to answer   Recent Concern: Financial Resource Strain - High Risk (3/9/2024)    Overall Financial Resource Strain (CARDIA)     Difficulty of Paying Living Expenses: Very hard   Food Insecurity: Patient Unable To Answer (3/14/2024)    Hunger Vital Sign     Worried About Running Out of Food in the Last Year: Patient unable to answer     Ran Out of Food in the Last Year: Patient unable to answer   Transportation Needs: Patient Unable To Answer (3/14/2024)    PRAPARE - Transportation     Lack of Transportation (Medical): Patient unable to answer     Lack of Transportation (Non-Medical): Patient unable to answer   Physical Activity: Inactive (3/13/2024)    Exercise Vital Sign     Days of Exercise per Week: 0 days     Minutes of Exercise per Session: 0 min   Stress: Patient Unable To Answer (3/14/2024)    [employer] [place] [employer] - Occupational Stress Questionnaire     Feeling of Stress : Patient unable to answer   Housing Stability: Patient Unable To Answer (3/14/2024)    Housing Stability Vital Sign     Unable to Pay for Housing in the Last Year: Patient unable to answer     Number of Places Lived in the Last Year: 1     Unstable Housing in the Last Year: Patient unable to answer   Recent Concern: Housing Stability - High Risk (3/9/2024)    Housing Stability Vital Sign     Unable to Pay for Housing in the Last Year: Yes     Unstable Housing in the Last Year: No     Review of Systems   Unable to perform ROS: Patient nonverbal     Objective:     Vital Signs (Most Recent):  Temp: 98.7 °F (37.1 °C) (06/20/24 0715)  Pulse: 91 (06/20/24 1059)  Resp: 18 (06/20/24 0355)  BP: (!) 163/76 (06/20/24 0921)  SpO2: 98 % (06/20/24 1100) Vital Signs (24h Range):  Temp:  [98.3 °F (36.8 °C)-98.9 °F (37.2 °C)] 98.7 °F (37.1 °C)  Pulse:  [] 91  Resp:  [18] 18  SpO2:  [97 %-100 %] 98 %  BP: (115-163)/(64-84) 163/76     Weight: 105.5 kg (232 lb 9.4 oz)  Body mass index is 36.43 kg/m².    Estimated  Creatinine Clearance: 17.3 mL/min (A) (based on SCr of 4.7 mg/dL (H)).     Physical Exam  Nursing note reviewed.   Constitutional:       General: She is not in acute distress.     Appearance: She is obese. She is not toxic-appearing.   HENT:      Head: Normocephalic and atraumatic.   Eyes:      General: No scleral icterus.     Conjunctiva/sclera: Conjunctivae normal.   Cardiovascular:      Rate and Rhythm: Regular rhythm. Tachycardia present.   Pulmonary:      Effort: Pulmonary effort is normal. No respiratory distress.   Abdominal:      General: There is no distension.      Palpations: Abdomen is soft.      Comments: G tube   Skin:     General: Skin is warm and dry.      Comments: R chest HD line w/o evidence of infection    Neurological:      Mental Status: She is alert.      Comments: Awake and alert. Following few commands. Non verbal          Significant Labs: CBC:   Recent Labs   Lab 06/19/24  0546 06/20/24  0715   WBC 9.86 9.45   HGB 9.9* 8.8*   HCT 32.5* 29.8*    429     CMP:   Recent Labs   Lab 06/19/24  0546 06/20/24  0715   * 133*   K 3.6 3.9   CL 95 96   CO2 24 25   * 96   BUN 6 17   CREATININE 2.3* 4.7*   CALCIUM 9.0 9.6   PROT 8.0 7.3   ALBUMIN 2.9* 2.7*   BILITOT 0.4 0.3   ALKPHOS 75 70   AST 24 18   ALT 18 14   ANIONGAP 12 12     Microbiology Results (last 7 days)       Procedure Component Value Units Date/Time    Blood culture [[id number]] Collected: 06/18/24 0807    Order Status: Completed Specimen: Blood from Peripheral, Hand, Left Updated: 06/20/24 1012     Blood Culture, Routine No Growth to date      No Growth to date      No Growth to date    Blood culture [[id number]] Collected: 06/18/24 0806    Order Status: Completed Specimen: Blood from Peripheral, Antecubital, Right Updated: 06/20/24 1012     Blood Culture, Routine No Growth to date      No Growth to date      No Growth to date    Blood culture [[id number]] Collected: 06/13/24 0622    Order Status: Completed Specimen:  Blood Updated: 06/18/24 0812     Blood Culture, Routine No growth after 5 days.    Blood culture [[id number]] Collected: 06/13/24 0622    Order Status: Completed Specimen: Blood Updated: 06/18/24 0812     Blood Culture, Routine No growth after 5 days.    Blood culture [[id number]] Collected: 06/10/24 0719    Order Status: Completed Specimen: Blood Updated: 06/15/24 1012     Blood Culture, Routine No growth after 5 days.    Narrative:      Lft forearm    Blood culture [[id number]] Collected: 06/10/24 0719    Order Status: Completed Specimen: Blood Updated: 06/15/24 1012     Blood Culture, Routine No growth after 5 days.    Narrative:      Lft hand    Respiratory Infection Panel (PCR), Nasopharyngeal [[id number]] Collected: 06/14/24 0922    Order Status: Completed Specimen: Nasopharyngeal Swab Updated: 06/14/24 1117     Respiratory Infection Panel Source NP Swab     Adenovirus Not Detected     Coronavirus 229E, Common Cold Virus Not Detected     Coronavirus HKU1, Common Cold Virus Not Detected     Coronavirus NL63, Common Cold Virus Not Detected     Coronavirus OC43, Common Cold Virus Not Detected     Comment: The Coronavirus strains detected in this test cause the common cold.  These strains are not the COVID-19 (novel Coronavirus)strain   associated with the respiratory disease outbreak.          SARS-CoV2 (COVID-19) Qualitative PCR Not Detected     Human Metapneumovirus Not Detected     Human Rhinovirus/Enterovirus Not Detected     Influenza A (subtypes H1, H1-2009,H3) Not Detected     Influenza B Not Detected     Parainfluenza Virus 1 Not Detected     Parainfluenza Virus 2 Not Detected     Parainfluenza Virus 3 Not Detected     Parainfluenza Virus 4 Not Detected     Respiratory Syncytial Virus Not Detected     Bordetella Parapertussis (EC9338) Not Detected     Bordetella pertussis (ptxP) Not Detected     Chlamydia pneumoniae Not Detected     Mycoplasma pneumoniae Not Detected    Narrative:      Assay not valid for  lower respiratory specimens, alternate  testing required.    Respiratory Infection Panel (PCR), Nasopharyngeal [[id number]]     Order Status: Canceled Specimen: Nasopharyngeal Swab             Significant Imaging: I have reviewed all pertinent imaging results/findings within the past 24 hours.

## 2024-06-20 NOTE — PT/OT/SLP PROGRESS
Physical Therapy Treatment    Patient Name:  Sarah Saravia   MRN:  [id number]    Recommendations:     Discharge Recommendations: Moderate Intensity Therapy  Discharge Equipment Recommendations: hospital bed, lift device, wheelchair  Barriers to discharge:  Increased skilled assistance required at this time    Assessment:     Sarah Saravia is a 53 y.o. female admitted with a medical diagnosis of Sepsis.  She presents with the following impairments/functional limitations: weakness, impaired endurance, impaired self care skills, impaired functional mobility, impaired balance, decreased lower extremity function, decreased safety awareness, decreased upper extremity function, impaired coordination. Pt tolerated treatment session fairly and was able to sit at EOB and perform some lateral scooting w/ mod fatigue and no c/o of pain. Pt remains motivated to participate in PT treatment sessions.    Rehab Prognosis: Fair; patient would benefit from acute skilled PT services to address these deficits and reach maximum level of function.    Recent Surgery: * No surgery found *      Plan:     During this hospitalization, patient to be seen 3 x/week to address the identified rehab impairments via gait training, therapeutic activities, therapeutic exercises, neuromuscular re-education and progress toward the following goals:    Plan of Care Expires:  07/22/24    Subjective     Chief Complaint: Pt communicates w/ head nods and facial expressions. Fear of falling and weakness  Pain/Comfort:  Pain Rating 1: 0/10  Pain Addressed 1: Reposition, Distraction, Cessation of Activity      Objective:     Communicated with nurse (Veronica) prior to session.  Patient found  sup w/ HOB elevated to 35*  with PEG Tube, pressure relief boots, peripheral IV, telemetry (pressure relief mattress), family present (daughter) upon PT entry to room.     General Precautions: Standard, aspiration, aphasia, NPO  Orthopedic Precautions: N/A  Braces:  N/A  Respiratory Status: Room air     Functional Mobility:  Bed Mobility:     Rolling Left:  total A of 2 persons  Rolling Right: total assistance and of 2 persons  Scooting: Anteriorly to EOB w/ max/ total A of 2 persons for hips and leg placement; laterally to right side total A of 1 for hip elevation and leg placement x2-3 minimal excursions; to HOB dependent of 2 via drawsheet  Supine to Sit: total A of 2 persons for trunk and legs, HOB elevated at 25*, exiting on right side  Sit to Supine: total A of 2 persons for trunk and legs, HOB flat  Balance: static seated balance at EOB w/ close supervision with B UE support for around 20 min      AM-PAC 6 CLICK MOBILITY  Turning over in bed (including adjusting bedclothes, sheets and blankets)?: 2  Sitting down on and standing up from a chair with arms (e.g., wheelchair, bedside commode, etc.): 1  Moving from lying on back to sitting on the side of the bed?: 2  Moving to and from a bed to a chair (including a wheelchair)?: 1  Need to walk in hospital room?: 1  Climbing 3-5 steps with a railing?: 1  Basic Mobility Total Score: 8       Treatment & Education:  Patient provided with daily orientation and goals of this PT session. They were educated to call for assistance and to transfer with hospital staff only.  Also, pt was educated on the effects of prolonged immobility and the importance of performing OOB activity and exercises to promote healing and reduce recovery time    Patient left  sup w/ HOB elevated at 35*  with all lines intact, call button in reach, nurse notified, and family present..    GOALS:   Multidisciplinary Problems       Physical Therapy Goals          Problem: Physical Therapy    Goal Priority Disciplines Outcome Goal Variances Interventions   Physical Therapy Goal     PT, PT/OT Progressing     Description: Goals to be met by: 05/07/24   Goals remain appropriate 5/3/2024 to be met by 5/17/24  Goals updated 5/13/2024 to be met by 5/27/24  Goals  updated 24 to be met by 24  Goals remain appropriate 24 to be met by 24    Patient will increase functional independence with mobility by performin. Supine to sit with Maximum Assistance  2. Sit to supine with Maximum Assistance  3. Rolling to Left and Right with Moderate Assistance.  4. Sitting at edge of bed 5 minutes with Moderate Assistance- MET , monitor consistency  4a. Sitting at edge of bed 10 minutes with Supervision  5. Lower extremity exercise program x20 reps per handout, with assistance as needed  6. Sit to stand transfer with Maximum Assistance with or without LRAD  7. Stand for 2 minutes with Maximum Assistance with or without LRAD                         Time Tracking:     PT Received On: 24  PT Start Time: 1238     PT Stop Time: 1322  PT Total Time (min): 44 min     Billable Minutes: Therapeutic Activity 44    Treatment Type: Treatment  PT/PTA: PTA     Number of PTA visits since last PT visit: 2024

## 2024-06-20 NOTE — PROGRESS NOTES
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 Hospitalization was c/b urosepsis, in which pt completed carbapenem course on 4/16. Trach was capped and later pt was decannulated on 4/30 which was successful. Pt remained inpatient while awaiting dispo plans, as well as requiring mgmt for lyte imbalances and dysphagia.     Pt was restarted on antibiotics on 6/10 per primary team as pt began to be tachycardic with a leukocytosis and low grade fever. Blood cx, RIP negative. CT CAP w/o contrast was overall unremarkable. Continued with increasing white count and fever and elevated procalcitonin.  Escalated to vanc/meropenem for possible aspiration pneumonia. Patient completed a course of vancomycin and meropenem with resolution of fevers and leukocytosis (last meropenem dose 6/16). Began having fevers and a leukocytosis again 6/18 prompting ID consult.       Patient re-started on Vanc/Meropenem. CT C/A/P without acute findings. Blood cx NGTD. No sacral wound development. Nurse denies multiple episodes of diarrhea. Fever and leukocytosis resolved quickly.    Recommendations:   - Continue Vancomycin and Meropenem x 5 days   - ID will sign off.        Thank you for the consult. Please secure chat for any questions.  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 Reportedly patient was less responsive than her baseline had an episode of emesis, in the ED she had a CT abdomen/pelvis concerning for cystitis as well as a UA (obtained via straight cath) concerning for a UTI. She was initially started on vancomycin and Zosyn later broadened to meropenem. On presentation her labs were notable for leukocytosis of 17, as well as a lactic acidosis of 3.9 that has improved to 2.4 with the administration of 1 L of IV fluids, we are being cautious with fluid repletion given her end-stage renal disease and oliguric status. ID is now consulted for abrupt increase in WBC to 22 w/ associated fever. Daughter at bedside notes that patient had a few episodes of vomiting 2 days ago after initiation of tube feeds, and feels her breathing pattern is different today. Patient is unable to answer questions. CXR  w/ increased vascular congestion. She has been restarted on broad spectrum abx.   Interval hx:  ID re-consulted 6/18 due to fevers and a leukocytosis after stopping abx. Vanc/meropenem restarted.  Blood cx NGTD. CT C/A/P reviewed and without acute findings. No sacral wound.     6/20 - fevers and leukocytosis resolved. Appears comfortable in no distress.      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    Hypermagnesemia 04/11/2024    POA, Mg 3.2  Daily chem       Morbid obesity     Necrotizing fasciitis        Past Surgical History:   Procedure Laterality Date    CLOSURE OF WOUND Right 2/22/2024    Procedure: CLOSURE, WOUND;  Surgeon: Steve Cole MD;  Location: [facility] OR [address] [place] [address];  Service: General;  Laterality: Right;    ESOPHAGOGASTRODUODENOSCOPY W/ PEG N/A 2/22/2024    Procedure: EGD, WITH PEG TUBE INSERTION;  Surgeon: Steve Cole MD;  Location: [facility] OR [address] [place] [address];  Service: General;  Laterality: N/A;     INCISION AND DRAINAGE OF PERIRECTAL REGION N/A 1/29/2024    Procedure: INCISION AND DRAINAGE, PERIRECTAL REGION;  Surgeon: Axel Ramsay MD;  Location: [facility] OR;  Service: General;  Laterality: N/A;    LUMBAR PUNCTURE N/A 2/16/2024    Procedure: Lumbar Puncture;  Surgeon: Macy Boykin;  Location: [facility];  Service: Anesthesiology;  Laterality: N/A;    PLACEMENT, TRIALYSIS CATH Right 1/31/2024    Procedure: INSERTION, CATHETER, TRIPLE LUMEN, HEMODIALYSIS, TEMPORARY;  Surgeon: Alvin Junior MD;  Location: [facility] OR;  Service: General;  Laterality: Right;    REPLACEMENT OF WOUND VACUUM-ASSISTED CLOSURE DEVICE Right 2/12/2024    Procedure: REPLACEMENT, WOUND VAC;  Surgeon: Mundo Carmona MD;  [facility];  Service: General;  Laterality: Right;    REPLACEMENT OF WOUND VACUUM-ASSISTED CLOSURE DEVICE N/A 2/15/2024    Procedure: REPLACEMENT, WOUND VAC;  Surgeon: Steve oCle MD;  [facility];  Service: General;  Laterality: N/A;    REPLACEMENT OF WOUND VACUUM-ASSISTED CLOSURE DEVICE Right 2/19/2024    Procedure: REPLACEMENT, WOUND VAC;  Surgeon: Steve Cole MD;  [facility];  Service: General;  Laterality: Right;  RLE/groin    REPLACEMENT OF WOUND VACUUM-ASSISTED CLOSURE DEVICE Right 2/22/2024    Procedure: REPLACEMENT, WOUND VAC;  Surgeon: Steve Cole MD;  [facility];  Service: General;  Laterality: Right;    TRACHEOSTOMY N/A 2/22/2024    Procedure: CREATION, TRACHEOSTOMY;  Surgeon: Steve Cole MD;  Location: [facility] OR [facility];  Service: General;  Laterality: N/A;    WOUND DEBRIDEMENT Bilateral 2/2/2024    Procedure: DEBRIDEMENT, WOUND;  Surgeon: Steve Cole MD;  [facility];  Service: General;  Laterality: Bilateral;  Bilateral groin  Possible wound vac placement    WOUND DEBRIDEMENT Right 2/6/2024    Procedure: DEBRIDEMENT, WOUND, replace wound vac, possible closure;  Surgeon: Steve Cole MD;  Location: [facility] OR [address] [place] [address];   Service: General;  Laterality: Right;  RLE    WOUND DEBRIDEMENT Right 2/9/2024    Procedure: DEBRIDEMENT, WOUND w wound vac change;  Surgeon: Steve Cole MD;  Location: [facility] OR 2ND FLR;  Service: General;  Laterality: Right;  RLE    WOUND DEBRIDEMENT Right 2/12/2024    Procedure: R thigh wound debridement;  Surgeon: Mundo Carmona MD;  Location: [facility] OR 2ND FLR;  Service: General;  Laterality: Right;    WOUND EXPLORATION Right 1/31/2024    Procedure: IRRIGATION & DEBRIDEMENT, WOUND DEBRIDEMENT;  Surgeon: Alvin Junior MD;  Location: [facility] OR;  Service: General;  Laterality: Right;       Review of patient's allergies indicates:  No Known Allergies    Medications:  Medications Prior to Admission   Medication Sig    ascorbic acid, vitamin C, (VITAMIN C) 500 MG tablet 500 mg by Per G Tube route 2 (two) times daily.    pantoprazole sodium (PANTOPRAZOLE ORAL) 40 mg once daily. Liquid via gtube    [DISCONTINUED] amLODIPine (NORVASC) 10 MG tablet 10 mg by Per G Tube route once daily. 0900    [DISCONTINUED] carvediloL (COREG) 25 MG tablet 25 mg by Per G Tube route 2 (two) times daily with meals.    [DISCONTINUED] chlorhexidine (PERIDEX) 0.12 % solution Use as directed 15 mLs in the mouth or throat 2 (two) times daily.    [DISCONTINUED] heparin sodium,porcine (HEPARIN, PORCINE,) 5,000 unit/mL injection Inject 7,500 Units into the skin every 8 (eight) hours.    [DISCONTINUED] insulin aspart U-100 (NOVOLOG) 100 unit/mL injection Inject 0-10 Units into the skin as needed for High Blood Sugar. Moderate correction dose sliding scale    [DISCONTINUED] insulin detemir U-100 (LEVEMIR) 100 unit/mL injection Inject 25 Units into the skin 2 (two) times a day.    [DISCONTINUED] psyllium (KONSYL) Powd 1 packet by Per G Tube route once daily.    [DISCONTINUED] sevelamer carbonate (RENVELA) 2.4 gram PwPk 2,400 mg by Per G Tube route 3 (three) times daily.    [DISCONTINUED] zinc sulfate (ZINCATE) 50 mg zinc (220 mg) capsule 220 mg  "by Per G Tube route once daily.     Antibiotics (From admission, onward)      Start     Stop Route Frequency Ordered    06/18/24 1100  meropenem (MERREM) 500 mg in sodium chloride 0.9 % 100 mL IVPB (MB+)         -- IV Every 12 hours (non-standard times) 06/18/24 0947    06/18/24 1041  vancomycin - pharmacy to dose  (vancomycin IVPB (PEDS and ADULTS))        Placed in "And" Linked Group    -- IV pharmacy to manage frequency 06/18/24 0941          Antifungals (From admission, onward)      None          Antivirals (From admission, onward)      None             Immunization History   Administered Date(s) Administered    PPD Test 08/16/2022, 04/23/2024, 05/16/2024, 05/29/2024, 06/17/2024    Td (ADULT) 11/16/2005       Family History    None       Social History     Socioeconomic History    Marital status: Single   Tobacco Use    Smoking status: Unknown     Social Determinants of Health     Financial Resource Strain: Patient Unable To Answer (3/14/2024)    Overall Financial Resource Strain (CARDIA)     Difficulty of Paying Living Expenses: Patient unable to answer   Recent Concern: Financial Resource Strain - High Risk (3/9/2024)    Overall Financial Resource Strain (CARDIA)     Difficulty of Paying Living Expenses: Very hard   Food Insecurity: Patient Unable To Answer (3/14/2024)    Hunger Vital Sign     Worried About Running Out of Food in the Last Year: Patient unable to answer     Ran Out of Food in the Last Year: Patient unable to answer   Transportation Needs: Patient Unable To Answer (3/14/2024)    PRAPARE - Transportation     Lack of Transportation (Medical): Patient unable to answer     Lack of Transportation (Non-Medical): Patient unable to answer   Physical Activity: Inactive (3/13/2024)    Exercise Vital Sign     Days of Exercise per Week: 0 days     Minutes of Exercise per Session: 0 min   Stress: Patient Unable To Answer (3/14/2024)    [employer] - Occupational Stress " Questionnaire     Feeling of Stress : Patient unable to answer   Housing Stability: Patient Unable To Answer (3/14/2024)    Housing Stability Vital Sign     Unable to Pay for Housing in the Last Year: Patient unable to answer     Number of Places Lived in the Last Year: 1     Unstable Housing in the Last Year: Patient unable to answer   Recent Concern: Housing Stability - High Risk (3/9/2024)    Housing Stability Vital Sign     Unable to Pay for Housing in the Last Year: Yes     Unstable Housing in the Last Year: No     Review of Systems   Unable to perform ROS: Patient nonverbal     Objective:     Vital Signs (Most Recent):  Temp: 98.7 °F (37.1 °C) (06/20/24 0715)  Pulse: 91 (06/20/24 1059)  Resp: 18 (06/20/24 0355)  BP: (!) 163/76 (06/20/24 0921)  SpO2: 98 % (06/20/24 1100) Vital Signs (24h Range):  Temp:  [98.3 °F (36.8 °C)-98.9 °F (37.2 °C)] 98.7 °F (37.1 °C)  Pulse:  [] 91  Resp:  [18] 18  SpO2:  [97 %-100 %] 98 %  BP: (115-163)/(64-84) 163/76     Weight: 105.5 kg (232 lb 9.4 oz)  Body mass index is 36.43 kg/m².    Estimated Creatinine Clearance: 17.3 mL/min (A) (based on SCr of 4.7 mg/dL (H)).     Physical Exam  Nursing note reviewed.   Constitutional:       General: She is not in acute distress.     Appearance: She is obese. She is not toxic-appearing.   HENT:      Head: Normocephalic and atraumatic.   Eyes:      General: No scleral icterus.     Conjunctiva/sclera: Conjunctivae normal.   Cardiovascular:      Rate and Rhythm: Regular rhythm. Tachycardia present.   Pulmonary:      Effort: Pulmonary effort is normal. No respiratory distress.   Abdominal:      General: There is no distension.      Palpations: Abdomen is soft.      Comments: G tube   Skin:     General: Skin is warm and dry.      Comments: R chest HD line w/o evidence of infection    Neurological:      Mental Status: She is alert.      Comments: Awake and alert. Following few commands. Non verbal          Significant Labs: CBC:   Recent Labs    Lab 06/19/24  0546 06/20/24  0715   WBC 9.86 9.45   HGB 9.9* 8.8*   HCT 32.5* 29.8*    429     CMP:   Recent Labs   Lab 06/19/24  0546 06/20/24  0715   * 133*   K 3.6 3.9   CL 95 96   CO2 24 25   * 96   BUN 6 17   CREATININE 2.3* 4.7*   CALCIUM 9.0 9.6   PROT 8.0 7.3   ALBUMIN 2.9* 2.7*   BILITOT 0.4 0.3   ALKPHOS 75 70   AST 24 18   ALT 18 14   ANIONGAP 12 12     Microbiology Results (last 7 days)       Procedure Component Value Units Date/Time    Blood culture [[id number]] Collected: 06/18/24 0807    Order Status: Completed Specimen: Blood from Peripheral, Hand, Left Updated: 06/20/24 1012     Blood Culture, Routine No Growth to date      No Growth to date      No Growth to date    Blood culture [[id number]] Collected: 06/18/24 0806    Order Status: Completed Specimen: Blood from Peripheral, Antecubital, Right Updated: 06/20/24 1012     Blood Culture, Routine No Growth to date      No Growth to date      No Growth to date    Blood culture [[id number]] Collected: 06/13/24 0622    Order Status: Completed Specimen: Blood Updated: 06/18/24 0812     Blood Culture, Routine No growth after 5 days.    Blood culture [[id number]] Collected: 06/13/24 0622    Order Status: Completed Specimen: Blood Updated: 06/18/24 0812     Blood Culture, Routine No growth after 5 days.    Blood culture [[id number]] Collected: 06/10/24 0719    Order Status: Completed Specimen: Blood Updated: 06/15/24 1012     Blood Culture, Routine No growth after 5 days.    Narrative:      Lft forearm    Blood culture [[id number]] Collected: 06/10/24 0719    Order Status: Completed Specimen: Blood Updated: 06/15/24 1012     Blood Culture, Routine No growth after 5 days.    Narrative:      Lft hand    Respiratory Infection Panel (PCR), Nasopharyngeal [[id number]] Collected: 06/14/24 0922    Order Status: Completed Specimen: Nasopharyngeal Swab Updated: 06/14/24 1117     Respiratory Infection Panel Source NP Swab     Adenovirus Not  Detected     Coronavirus 229E, Common Cold Virus Not Detected     Coronavirus HKU1, Common Cold Virus Not Detected     Coronavirus NL63, Common Cold Virus Not Detected     Coronavirus OC43, Common Cold Virus Not Detected     Comment: The Coronavirus strains detected in this test cause the common cold.  These strains are not the COVID-19 (novel Coronavirus)strain   associated with the respiratory disease outbreak.          SARS-CoV2 (COVID-19) Qualitative PCR Not Detected     Human Metapneumovirus Not Detected     Human Rhinovirus/Enterovirus Not Detected     Influenza A (subtypes H1, H1-2009,H3) Not Detected     Influenza B Not Detected     Parainfluenza Virus 1 Not Detected     Parainfluenza Virus 2 Not Detected     Parainfluenza Virus 3 Not Detected     Parainfluenza Virus 4 Not Detected     Respiratory Syncytial Virus Not Detected     Bordetella Parapertussis ([id number]) Not Detected     Bordetella pertussis (ptxP) Not Detected     Chlamydia pneumoniae Not Detected     Mycoplasma pneumoniae Not Detected    Narrative:      Assay not valid for lower respiratory specimens, alternate  testing required.    Respiratory Infection Panel (PCR), Nasopharyngeal [[id number]]     Order Status: Canceled Specimen: Nasopharyngeal Swab             Significant Imaging: I have reviewed all pertinent imaging results/findings within the past 24 hours.

## 2024-06-20 NOTE — ASSESSMENT & PLAN NOTE
Adjusting insulin to account for diabetic TF    Patient's FSGs are controlled on current medication regimen.  Last A1c reviewed-   Lab Results   Component Value Date    HGBA1C 6.2 (H) 05/27/2024     Most recent fingerstick glucose reviewed-   Recent Labs   Lab 06/19/24 2126   POCTGLUCOSE 106       Current correctional scale  Medium  Maintain anti-hyperglycemic dose as follows-   Antihyperglycemics (From admission, onward)    Start     Stop Route Frequency Ordered    06/09/24 2100  insulin glargine U-100 (Lantus) pen 13 Units         -- SubQ Nightly 06/09/24 0756    06/09/24 1255  insulin aspart U-100 pen 0-10 Units         -- SubQ Before meals & nightly PRN 06/09/24 1155        Hold Oral hypoglycemics while patient is in the hospital.  POCT glucose checks   Continue tube feeds

## 2024-06-21 LAB
ALBUMIN SERPL BCP-MCNC: 2.8 G/DL (ref 3.5–5.2)
ALP SERPL-CCNC: 70 U/L (ref 55–135)
ALT SERPL W/O P-5'-P-CCNC: 12 U/L (ref 10–44)
ANION GAP SERPL CALC-SCNC: 14 MMOL/L (ref 8–16)
AST SERPL-CCNC: 18 U/L (ref 10–40)
BASOPHILS # BLD AUTO: 0.05 K/UL (ref 0–0.2)
BASOPHILS NFR BLD: 0.7 % (ref 0–1.9)
BILIRUB SERPL-MCNC: 0.3 MG/DL (ref 0.1–1)
BUN SERPL-MCNC: 17 MG/DL (ref 6–20)
CALCIUM SERPL-MCNC: 9.4 MG/DL (ref 8.7–10.5)
CHLORIDE SERPL-SCNC: 96 MMOL/L (ref 95–110)
CO2 SERPL-SCNC: 24 MMOL/L (ref 23–29)
CREAT SERPL-MCNC: 4.8 MG/DL (ref 0.5–1.4)
DIFFERENTIAL METHOD BLD: ABNORMAL
EOSINOPHIL # BLD AUTO: 0.5 K/UL (ref 0–0.5)
EOSINOPHIL NFR BLD: 6.6 % (ref 0–8)
ERYTHROCYTE [DISTWIDTH] IN BLOOD BY AUTOMATED COUNT: 16.6 % (ref 11.5–14.5)
EST. GFR  (NO RACE VARIABLE): 10.3 ML/MIN/1.73 M^2
GLUCOSE SERPL-MCNC: 95 MG/DL (ref 70–110)
HCT VFR BLD AUTO: 28.8 % (ref 37–48.5)
HGB BLD-MCNC: 8.6 G/DL (ref 12–16)
IMM GRANULOCYTES # BLD AUTO: 0.06 K/UL (ref 0–0.04)
IMM GRANULOCYTES NFR BLD AUTO: 0.8 % (ref 0–0.5)
LYMPHOCYTES # BLD AUTO: 2 K/UL (ref 1–4.8)
LYMPHOCYTES NFR BLD: 27.3 % (ref 18–48)
MAGNESIUM SERPL-MCNC: 2.1 MG/DL (ref 1.6–2.6)
MCH RBC QN AUTO: 24.5 PG (ref 27–31)
MCHC RBC AUTO-ENTMCNC: 29.9 G/DL (ref 32–36)
MCV RBC AUTO: 82 FL (ref 82–98)
MONOCYTES # BLD AUTO: 0.9 K/UL (ref 0.3–1)
MONOCYTES NFR BLD: 12.1 % (ref 4–15)
NEUTROPHILS # BLD AUTO: 3.8 K/UL (ref 1.8–7.7)
NEUTROPHILS NFR BLD: 52.5 % (ref 38–73)
NRBC BLD-RTO: 0 /100 WBC
PHOSPHATE SERPL-MCNC: 3.1 MG/DL (ref 2.7–4.5)
PLATELET # BLD AUTO: 436 K/UL (ref 150–450)
PMV BLD AUTO: 9.5 FL (ref 9.2–12.9)
POTASSIUM SERPL-SCNC: 3.9 MMOL/L (ref 3.5–5.1)
PROCALCITONIN SERPL IA-MCNC: 6.8 NG/ML
PROT SERPL-MCNC: 7.9 G/DL (ref 6–8.4)
RBC # BLD AUTO: 3.51 M/UL (ref 4–5.4)
SODIUM SERPL-SCNC: 134 MMOL/L (ref 136–145)
VANCOMYCIN SERPL-MCNC: 13.3 UG/ML
WBC # BLD AUTO: 7.25 K/UL (ref 3.9–12.7)

## 2024-06-21 PROCEDURE — 25000003 PHARM REV CODE 250

## 2024-06-21 PROCEDURE — 84145 PROCALCITONIN (PCT): CPT

## 2024-06-21 PROCEDURE — 63600175 PHARM REV CODE 636 W HCPCS: Performed by: NURSE PRACTITIONER

## 2024-06-21 PROCEDURE — 63600175 PHARM REV CODE 636 W HCPCS: Performed by: FAMILY MEDICINE

## 2024-06-21 PROCEDURE — 25000003 PHARM REV CODE 250: Performed by: NURSE PRACTITIONER

## 2024-06-21 PROCEDURE — 97112 NEUROMUSCULAR REEDUCATION: CPT

## 2024-06-21 PROCEDURE — 80202 ASSAY OF VANCOMYCIN: CPT | Performed by: FAMILY MEDICINE

## 2024-06-21 PROCEDURE — 80100014 HC HEMODIALYSIS 1:1

## 2024-06-21 PROCEDURE — 83735 ASSAY OF MAGNESIUM: CPT

## 2024-06-21 PROCEDURE — 63600175 PHARM REV CODE 636 W HCPCS: Performed by: STUDENT IN AN ORGANIZED HEALTH CARE EDUCATION/TRAINING PROGRAM

## 2024-06-21 PROCEDURE — 25000003 PHARM REV CODE 250: Performed by: FAMILY MEDICINE

## 2024-06-21 PROCEDURE — 97530 THERAPEUTIC ACTIVITIES: CPT

## 2024-06-21 PROCEDURE — 84100 ASSAY OF PHOSPHORUS: CPT

## 2024-06-21 PROCEDURE — 25000003 PHARM REV CODE 250: Performed by: STUDENT IN AN ORGANIZED HEALTH CARE EDUCATION/TRAINING PROGRAM

## 2024-06-21 PROCEDURE — 80053 COMPREHEN METABOLIC PANEL: CPT

## 2024-06-21 PROCEDURE — 90935 HEMODIALYSIS ONE EVALUATION: CPT | Mod: ,,, | Performed by: NURSE PRACTITIONER

## 2024-06-21 PROCEDURE — 20600001 HC STEP DOWN PRIVATE ROOM

## 2024-06-21 PROCEDURE — 27000207 HC ISOLATION

## 2024-06-21 PROCEDURE — 85025 COMPLETE CBC W/AUTO DIFF WBC: CPT

## 2024-06-21 PROCEDURE — 63600175 PHARM REV CODE 636 W HCPCS

## 2024-06-21 RX ORDER — HEPARIN SODIUM 1000 [USP'U]/ML
1000 INJECTION, SOLUTION INTRAVENOUS; SUBCUTANEOUS
Status: DISCONTINUED | OUTPATIENT
Start: 2024-06-21 | End: 2024-07-08 | Stop reason: HOSPADM

## 2024-06-21 RX ADMIN — ACETAMINOPHEN 650 MG: 650 SOLUTION ORAL at 03:06

## 2024-06-21 RX ADMIN — SODIUM CHLORIDE: 9 INJECTION, SOLUTION INTRAVENOUS at 08:06

## 2024-06-21 RX ADMIN — MEROPENEM 500 MG: 500 INJECTION INTRAVENOUS at 11:06

## 2024-06-21 RX ADMIN — POLYETHYLENE GLYCOL 3350 17 G: 17 POWDER, FOR SOLUTION ORAL at 08:06

## 2024-06-21 RX ADMIN — PANTOPRAZOLE SODIUM 40 MG: 40 GRANULE, DELAYED RELEASE ORAL at 12:06

## 2024-06-21 RX ADMIN — ERYTHROPOIETIN 6100 UNITS: 10000 INJECTION, SOLUTION INTRAVENOUS; SUBCUTANEOUS at 10:06

## 2024-06-21 RX ADMIN — ASPIRIN 81 MG CHEWABLE TABLET 81 MG: 81 TABLET CHEWABLE at 12:06

## 2024-06-21 RX ADMIN — POLYETHYLENE GLYCOL 3350 17 G: 17 POWDER, FOR SOLUTION ORAL at 03:06

## 2024-06-21 RX ADMIN — Medication 500 MG: at 12:06

## 2024-06-21 RX ADMIN — HEPARIN SODIUM 5000 UNITS: 5000 INJECTION INTRAVENOUS; SUBCUTANEOUS at 09:06

## 2024-06-21 RX ADMIN — HEPARIN SODIUM 1000 UNITS: 1000 INJECTION, SOLUTION INTRAVENOUS; SUBCUTANEOUS at 11:06

## 2024-06-21 RX ADMIN — ATORVASTATIN CALCIUM 40 MG: 40 TABLET, FILM COATED ORAL at 12:06

## 2024-06-21 RX ADMIN — VANCOMYCIN HYDROCHLORIDE 500 MG: 500 INJECTION, POWDER, LYOPHILIZED, FOR SOLUTION INTRAVENOUS at 12:06

## 2024-06-21 RX ADMIN — HEPARIN SODIUM 5000 UNITS: 5000 INJECTION INTRAVENOUS; SUBCUTANEOUS at 03:06

## 2024-06-21 RX ADMIN — METOPROLOL TARTRATE 25 MG: 25 TABLET, FILM COATED ORAL at 12:06

## 2024-06-21 RX ADMIN — INSULIN GLARGINE 13 UNITS: 100 INJECTION, SOLUTION SUBCUTANEOUS at 08:06

## 2024-06-21 RX ADMIN — MEROPENEM 500 MG: 500 INJECTION INTRAVENOUS at 01:06

## 2024-06-21 RX ADMIN — Medication 500 MG: at 08:06

## 2024-06-21 RX ADMIN — HEPARIN SODIUM 3000 UNITS: 1000 INJECTION, SOLUTION INTRAVENOUS; SUBCUTANEOUS at 08:06

## 2024-06-21 RX ADMIN — METOPROLOL TARTRATE 25 MG: 25 TABLET, FILM COATED ORAL at 08:06

## 2024-06-21 RX ADMIN — HEPARIN SODIUM 5000 UNITS: 5000 INJECTION INTRAVENOUS; SUBCUTANEOUS at 05:06

## 2024-06-21 NOTE — PROGRESS NOTES
Pharmacokinetic Initial Assessment: IV Vancomycin    Assessment/Plan:    -Vancomycin restarted for fevers/leukocytosis  -Per ID, plans to complete 5d of empiric abx   -Pre-HD vanco random resulted 13.3 mcg/dL  -Re-dose with vancomycin 500 mg IVPB x1  -EOT 6/22, will not order additional vancomycin levels at this time    Please contact pharmacy at extension [phone] with any questions regarding this assessment.     Thank you for the consult,   Allyson Olson       Patient brief summary:  Sarah Saravia is a 53 y.o. female initiated on antimicrobial therapy with IV Vancomycin for treatment of suspected  empiric therapy    Drug Allergies:   Review of patient's allergies indicates:  No Known Allergies    Actual Body Weight:   106 kg    Renal Function:   Estimated Creatinine Clearance: 16.9 mL/min (A) (based on SCr of 4.8 mg/dL (H)).,     Dialysis Method (if applicable):  intermittent HD    CBC (last 72 hours):  Recent Labs   Lab Result Units 06/19/24  0546 06/20/24  0715 06/21/24  0830   WBC K/uL 9.86 9.45 7.25   Hemoglobin g/dL 9.9* 8.8* 8.6*   Hematocrit % 32.5* 29.8* 28.8*   Platelets K/uL 374 429 436   Gran % % 64.0 58.0 52.5   Lymph % % 19.4 21.7 27.3   Mono % % 10.4 13.9 12.1   Eosinophil % % 4.3 5.3 6.6   Basophil % % 0.8 0.5 0.7   Differential Method  Automated Automated Automated       Metabolic Panel (last 72 hours):  Recent Labs   Lab Result Units 06/19/24  0546 06/20/24  0715 06/21/24  0830   Sodium mmol/L 131* 133* 134*   Potassium mmol/L 3.6 3.9 3.9   Chloride mmol/L 95 96 96   CO2 mmol/L 24 25 24   Glucose mg/dL 115* 96 95   BUN mg/dL 6 17 17   Creatinine mg/dL 2.3* 4.7* 4.8*   Albumin g/dL 2.9* 2.7* 2.8*   Total Bilirubin mg/dL 0.4 0.3 0.3   Alkaline Phosphatase U/L 75 70 70   AST U/L 24 18 18   ALT U/L 18 14 12   Magnesium mg/dL 1.9 2.1 2.1   Phosphorus mg/dL 1.6* 3.4 3.1       Drug levels (last 3 results):  Recent Labs   Lab Result Units 06/21/24  0830   Vancomycin, Random ug/mL 13.3        Microbiologic Results:  Microbiology Results (last 7 days)       Procedure Component Value Units Date/Time    Blood culture [[id number]] Collected: 06/18/24 0807    Order Status: Completed Specimen: Blood from Peripheral, Hand, Left Updated: 06/20/24 1012     Blood Culture, Routine No Growth to date      No Growth to date      No Growth to date    Blood culture [[id number]] Collected: 06/18/24 0806    Order Status: Completed Specimen: Blood from Peripheral, Antecubital, Right Updated: 06/20/24 1012     Blood Culture, Routine No Growth to date      No Growth to date      No Growth to date    Blood culture [[id number]] Collected: 06/13/24 0622    Order Status: Completed Specimen: Blood Updated: 06/18/24 0812     Blood Culture, Routine No growth after 5 days.    Blood culture [[id number]] Collected: 06/13/24 0622    Order Status: Completed Specimen: Blood Updated: 06/18/24 0812     Blood Culture, Routine No growth after 5 days.    Blood culture [[id number]] Collected: 06/10/24 0719    Order Status: Completed Specimen: Blood Updated: 06/15/24 1012     Blood Culture, Routine No growth after 5 days.    Narrative:      Lft forearm    Blood culture [[id number]] Collected: 06/10/24 0719    Order Status: Completed Specimen: Blood Updated: 06/15/24 1012     Blood Culture, Routine No growth after 5 days.    Narrative:      Lft hand    Respiratory Infection Panel (PCR), Nasopharyngeal [[id number]] Collected: 06/14/24 0922    Order Status: Completed Specimen: Nasopharyngeal Swab Updated: 06/14/24 1117     Respiratory Infection Panel Source NP Swab     Adenovirus Not Detected     Coronavirus 229E, Common Cold Virus Not Detected     Coronavirus HKU1, Common Cold Virus Not Detected     Coronavirus NL63, Common Cold Virus Not Detected     Coronavirus OC43, Common Cold Virus Not Detected     Comment: The Coronavirus strains detected in this test cause the common cold.  These strains are not the COVID-19 (novel Coronavirus)strain    associated with the respiratory disease outbreak.          SARS-CoV2 (COVID-19) Qualitative PCR Not Detected     Human Metapneumovirus Not Detected     Human Rhinovirus/Enterovirus Not Detected     Influenza A (subtypes H1, H1-2009,H3) Not Detected     Influenza B Not Detected     Parainfluenza Virus 1 Not Detected     Parainfluenza Virus 2 Not Detected     Parainfluenza Virus 3 Not Detected     Parainfluenza Virus 4 Not Detected     Respiratory Syncytial Virus Not Detected     Bordetella Parapertussis ([id number]) Not Detected     Bordetella pertussis (ptxP) Not Detected     Chlamydia pneumoniae Not Detected     Mycoplasma pneumoniae Not Detected    Narrative:      Assay not valid for lower respiratory specimens, alternate  testing required.    Respiratory Infection Panel (PCR), Nasopharyngeal [[id number]]     Order Status: Canceled Specimen: Nasopharyngeal Swab

## 2024-06-21 NOTE — PT/OT/SLP PROGRESS
Occupational Therapy  Co- Treatment    Name: Sarah Saravia  MRN: [id number]  Admitting Diagnosis:  Sepsis     Co-treatment performed due to patient's multiple deficits requiring two skilled therapiststo appropriately and safely assess patient's strength and endurance while facilitating functional tasks in addition to accommodating for patient's activity tolerance.     Recommendations:     Discharge Recommendations: Moderate Intensity Therapy  Discharge Equipment Recommendations:  hospital bed, lift device, wheelchair  Barriers to discharge:  Other (Comment) (requires extensive assist)    Assessment:     Sarah Saravia is a 53 y.o. female with a medical diagnosis of Sepsis.  She presents with significant assist required for all mobility. Pain  noted in RLE with mobility on this date. Pt. Appeared to be fatigued from HD. Pt. HR also noted to be elevated on this date in lower 120's.  Patient would benefit from continued OT services to maximize level of safety and independence with self-care tasks. '  . Performance deficits affecting function are weakness, impaired endurance, impaired self care skills, impaired functional mobility, pain, impaired cognition, decreased upper extremity function, decreased lower extremity function, impaired cardiopulmonary response to activity.     Rehab Prognosis:  Fair; patient would benefit from acute skilled OT services to address these deficits and reach maximum level of function.       Plan:     Patient to be seen 3 x/week to address the above listed problems via self-care/home management, therapeutic activities, therapeutic exercises, neuromuscular re-education, cognitive retraining  Plan of Care Expires: 07/03/24  Plan of Care Reviewed with: patient, daughter    Subjective     Chief Complaint: pt. Aphasic but did whimper with bed mobility and movement of RLE  Patient/Family Comments/goals: no goals stated  Pain/Comfort:  Pain Rating 1:  (grimaced/whimpered with movement to  RLE)  Location - Side 1: Right  Location 1: leg  Pain Addressed 1: Reposition, Distraction  Pain Rating Post-Intervention 1:  (no increases in pain noted)    Objective:     Communicated with: nurse prior to session.  Patient found supine with PEG Tube, telemetry, pressure relief boots upon OT entry to room.Daughter present; pt. On step wise bed    General Precautions: Standard, aphasia, aspiration, NPO    Orthopedic Precautions:N/A  Braces: N/A  Respiratory Status: Room air     Occupational Performance:     Bed Mobility:    Patient completed Scooting/Bridging with total assistance and 2 persons  Patient completed Supine to Sit with total assistance and 2 persons  Patient completed Sit to Supine with total assistance and 2 persons     Functional Mobility/Transfers:  Sit to stand x 2 trials with total A x 2 and not able to completely clear buttocks on this date  Functional Mobility: not tested    Activities of Daily Living:  Toileting: dependence for cleaning from loose BM      [facility] 6 Click ADL: 9    Treatment & Education:  Pt. Declined grooming tasks on this date and difficulty noted with engagement of other tasks when seated EOB  Pt. Required CGA/Min A on this date to sit EOB    Patient left supine with all lines intact, call button in reach, and daughter present    GOALS:   Multidisciplinary Problems       Occupational Therapy Goals          Problem: Occupational Therapy    Goal Priority Disciplines Outcome Interventions   Occupational Therapy Goal     OT, PT/OT Progressing    Description: Goals to be met by: 5/22/24 Goals revised as needed on 05-30 to be met by 06-19:Goals revised and extended to 07-03    Patient will increase functional independence with ADLs by performing:    Revised: UBD with Min A NOT MET  New Goal: UBD seated EOB with Mod A  Revised: Grooming seated EOB with CGA NOT MET  New Goal groom seated EOB with Min A  Revised: Sit EOB x 20 minutes with SBA  MET 06-10-24  Rolling to Bilateral with  Moderate Assistance.NOT MET 06-19     Supine to sit with Maximum Assistance. NOT MET 06-19                           Time Tracking:     OT Date of Treatment: 06/21/24  OT Start Time: 1349  OT Stop Time: 1419  OT Total Time (min): 30 min    Billable Minutes:Therapeutic Activity 30    OT/JOSÉ: OT     Number of JOSÉ visits since last OT visit: 0    6/21/2024

## 2024-06-21 NOTE — PROGRESS NOTES
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 History was given from patient's daughter. She was undergoing dialysis today and noticed she was lethargic, less alert than usual self. Pt completed dialysis and still not acting herself. EMS was called, fever of a 100.  On chart review, she did have an episode of large volume emesis around 1700.  Per EMS, she had a slightly elevated temp 100.0°F, glucose 300s.     In the ED: UA 2+ leuks, >100 WBC, many bacteria, WBC 17, CT abd/pelvis concerning for cystitis. Given vanc/zosyn    Overview/Hospital Course:  53 JARROD admitted to Hospital Medicine service for urosepsis. Vanc/zosyn initiated, found to have staph epi in all 4 bottles, vanc/ertapenem course completed per ID. Vascular Neurology consulted concerning mental status change with the recommendation to initiate ASA 81 QD and to obtain an MRI to r/o new stroke. Per discussion with vascular neurology, MRI findings appear to be expected changes from prior stroke. Nephrology was consulted for regularly scheduled dialysis. Pulm consulted, patient decannulated 4/30. Failed swallow assessment, continuing tube feeds. Ongoing placement difficulties d/t need for accepting HD facility to have sandee lift with 2 personnel to operate. Once patient gets accepted at [facility], next step will be to work with daughter on [facility] NH placement. SW onboard working on paperwork for long term Medicaid application. H/c c/b new fever, ID reconsulted, CT chest showing bilateral ground glass opacities,  Vanc/meropenem course to be completed 6/17, however patient had further episode of fever and will need continued merem and ID consulted for assistance. CT pan scan to assess for infection. Imaging without signs of infection, patient to complete empiric abx for 5 days.    Interval History: NAEO, VSS, patient to continue PT/OT evaluation and antibiotic therapy, to finish tomorrow.    Review of Systems   Unable to perform ROS: Patient nonverbal     Objective:     Vital Signs (Most Recent):  Temp: 98.4 °F (36.9 °C) (06/21/24 1243)  Pulse: (!) 115 (06/21/24 1243)  Resp: 15 (06/21/24 1243)  BP: 117/78 (06/21/24 1243)  SpO2: 98 % (06/21/24 1243) Vital Signs (24h Range):  Temp:  [98 °F (36.7 °C)-98.7 °F (37.1 °C)] 98.4 °F (36.9 °C)  Pulse:  [] 115  Resp:  [13-18] 15  SpO2:  [98 %-100 %] 98 %  BP: (113-180)/(64-93) 117/78     Weight: 105.5 kg (232 lb 9.4 oz)  Body mass index is 36.43 kg/m².  No intake or output data in the 24 hours ending 06/21/24 1309      Physical Exam  Nursing note reviewed.   Constitutional:       General: She is not in acute distress.     Appearance: She is obese. She is not toxic-appearing.   HENT:      Head: Normocephalic and atraumatic.   Eyes:      General: No scleral icterus.     Conjunctiva/sclera: Conjunctivae normal.   Cardiovascular:      Rate and Rhythm: Regular rhythm. Tachycardia present.   Pulmonary:      Effort: Pulmonary effort is normal. No respiratory distress.   Abdominal:      General: There is no distension.      Palpations: Abdomen is soft.      Comments: G tube   Skin:     General: Skin is warm and dry.      Comments: R chest HD line w/o evidence of infection    Neurological:      Mental Status: She is alert.      Comments: Awake and alert. Following few commands. Non verbal             Significant Labs: All pertinent labs within the past 24 hours have been reviewed.    Significant Imaging: I have reviewed all pertinent imaging results/findings within the past 24  hours.    Assessment/Plan:      * Sepsis  This patient does have evidence of infective focus. My overall impression is sepsis. Source: Skin and Soft Tissue (location Abdominal). Not requiring pressors. Source control achieved by: vanc/meropenem.    - Concerns septic source may be PEG site with associated purulent wound despite Wound Care attempts to protect with barriers. PEG placed by General Surgery 2/2024. General Surgery consulted regarding PEG site ordered CT abdomen. Currently in place.   - Continue meropenem/vanc for empiric 5 days course. Comprehensive skin assessment without skin involvement, perineum sutures still in place, but without signs of infection, removed. CT pan scan w/ IV contrast without evidence of infection  - US extremities negative for thrombus   - ID reconsulted for further assistance    Cervical stenosis of spinal canal  Outpatient MRI and NSGY f/up      Vulvovaginal candidiasis  Noted 5/29, given 150mg fluconazole x1      Irritant contact dermatitis due friction or contact with other specified body fluids  Wound care following       Debility  Needs:  Wheelchair: patient has a mobility limitation that significantly impairs her ability to participate in one or more mobility related activities of daily living (MRADL's) such as toileting, feeding, dressing, grooming, and bathing in customary locations in the home.  The mobility limitation cannot be sufficiently resolved by the use of a cane or walker.   The use of a manual wheelchair will significantly improve the patient's ability to participate in MRADLS and the patient will use it on regular basis in the home.  Family/pt has expressed her willingness to use a manual wheelchair in the home.  She also has a caregiver who is capable of assisting in mobility.    Mrs Saravia requires a hospital bed due to her requiring positioning of the body in ways not feasible with an ordinary bed to alleviate pain/ is completely immobile /or limited mobility  and cannot independently make changes in body position without the use of the bed.  The positioning of the body cannot be sufficiently resolved by the use of pillows and wedges    Patient has a mobility limitation that significantly impairs their ability to participate in one or more mobility related activities of daily living, including toileting. This deficit can be resolved by using a bedside commode. Patient demonstrates mobility limitations that will cause them to be confined to one room at home without bathroom access for up to 30 days. Using a bedside commode will greatly improve the patient's ability to participate in MRADLs       Complication of vascular dialysis catheter  Cath intermittently clogging, not resolving with cath-hedy, going for IR venogram 4/30 with possible exchange. S/p exchange with IR on 4/30      Constipation  Stool burden on CT    -continue bowel reg  -monitor for BMs    Moderate malnutrition  Nutrition consulted. Most recent weight and BMI monitored-     Measurements:  Wt Readings from Last 1 Encounters:   06/19/24 105.5 kg (232 lb 9.4 oz)   Body mass index is 36.43 kg/m².    Patient has been screened and assessed by RD.    Malnutrition Type:  Context: acute illness or injury  Level: moderate    Malnutrition Characteristic Summary:  Weight Loss (Malnutrition): 10% in 6 months  Subcutaneous Fat (Malnutrition): mild depletion  Muscle Mass (Malnutrition): mild depletion  Fluid Accumulation (Malnutrition): mild    Interventions/Recommendations (treatment strategy):  - TF  - previous history of failed swallow studies    Prolonged QT interval  Limit Qtc prolonging drugs as able    Spastic hemiplegia of right dominant side as late effect of cerebrovascular disease  Important that patient is able to sit in chair for 4h for dialysis placement needs, even if she requires lift/assistance getting into the chair.This patient has Chronic right hemiplegia due to stroke. Physical therapy services has  "been scheduled. Continue all standard measures for pressure injury prevention and consult wound care for any wounds (chronic or acute).    ESRD (end stage renal disease) on dialysis  Creatine stable for now. BMP reviewed- noted Estimated Creatinine Clearance: 16.9 mL/min (A) (based on SCr of 4.8 mg/dL (H)). according to latest data. Based on current GFR, CKD stage is end stage.  Monitor UOP and serial BMP and adjust therapy as needed. Renally dose meds. Avoid nephrotoxic medications and procedures.    -- HD MWF (~1L fluid removal on average per session)  -- nephro following  -- Hypophosphatemic on sevelamer  -- SW onboard for placement to [facility] to continue dialysis. Once patient gets accepted at [facility], next step will be [facility].     PEG (percutaneous endoscopic gastrostomy) status  On PEG tube.Wound care with PEG dressing changes.   - CT abdomen with PEG in correct location  - Tube feeds as toelrated    Leukocytosis  See "Sepsis"      Type 2 diabetes mellitus with hyperglycemia, with long-term current use of insulin  Adjusting insulin to account for diabetic TF    Patient's FSGs are controlled on current medication regimen.  Last A1c reviewed-   Lab Results   Component Value Date    HGBA1C 6.2 (H) 05/27/2024     Most recent fingerstick glucose reviewed-   Recent Labs   Lab 06/20/24  2113   POCTGLUCOSE 110       Current correctional scale  Medium  Maintain anti-hyperglycemic dose as follows-   Antihyperglycemics (From admission, onward)      Start     Stop Route Frequency Ordered    06/09/24 2100  insulin glargine U-100 (Lantus) pen 13 Units         -- SubQ Nightly 06/09/24 0756    06/09/24 1255  insulin aspart U-100 pen 0-10 Units         -- SubQ Before meals & nightly PRN 06/09/24 1155          Hold Oral hypoglycemics while patient is in the hospital.  POCT glucose checks   Continue tube feeds    Hyponatremia  Patient has hyponatremia which is uncontrolled,We " will aim to correct the sodium by 4-6mEq in 24 hours. We will monitor sodium Daily. The hyponatremia is due to ESRD. Discussed with nephrology, dialysate bath adjusted to account for and correct hyponatremia.  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 Pt underwent extensive course, currently still healing from this, but no longer has wound vac. Picture in media tabs    - Wound care while inpatient  - NH with wound care orders        VTE Risk Mitigation (From admission, onward)           Ordered     heparin (porcine) injection 1,000 Units  As needed (PRN)         06/21/24 0828     heparin (porcine) injection 1,000 Units  As needed (PRN)         06/10/24 0035     heparin (porcine) injection 5,000 Units  Every 8 hours         05/29/24 0823     heparin (porcine) injection 3,000 Units  As needed (PRN)         04/17/24 0825                    Discharge Planning   ZEKE: 6/24/2024     Code Status: Full Code   Is the patient medically ready for discharge?: No    Reason for patient still in hospital (select all that apply): Patient trending condition and Treatment  Discharge Plan A: New Nursing Home placement - [facility] care facility ([facility])            Lucien Driver MD  Department of Hospital Medicine   [facility] - Telemetry Stepdown

## 2024-06-21 NOTE — CONSULTS
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    Hypermagnesemia 04/11/2024    POA, Mg 3.2  Daily chem       Morbid obesity     Necrotizing fasciitis        Social History     Socioeconomic History    Marital status: Single   Tobacco Use    Smoking status: Unknown     Social Determinants of Health     Financial Resource Strain: Patient Unable To Answer (3/14/2024)    Overall Financial Resource Strain (CARDIA)     Difficulty of Paying Living Expenses: Patient unable to answer   Recent Concern: Financial Resource Strain - High Risk (3/9/2024)    Overall Financial Resource Strain (CARDIA)     Difficulty of Paying Living Expenses: Very hard   Food Insecurity: Patient Unable To Answer (3/14/2024)    Hunger Vital Sign     Worried About Running Out of Food in the Last Year: Patient unable to answer     Ran Out of Food in the Last Year: Patient unable to answer   Transportation Needs: Patient Unable To Answer (3/14/2024)    PRAPARE - Transportation     Lack of Transportation (Medical): Patient unable to answer     Lack of Transportation (Non-Medical): Patient unable to answer   Physical Activity: Inactive (3/13/2024)    Exercise Vital Sign     Days of Exercise per Week: 0 days     Minutes of Exercise per Session: 0 min   Stress: Patient Unable To Answer (3/14/2024)    [employer] [place] [employer] - Occupational Stress Questionnaire     Feeling of Stress : Patient unable to answer   Housing Stability: Patient Unable To Answer (3/14/2024)    Housing Stability Vital Sign     Unable to Pay for Housing in the Last Year: Patient unable to answer     Number of Places Lived in the Last Year: 1     Unstable Housing in the Last Year: Patient unable to answer   Recent Concern: Housing Stability - High Risk (3/9/2024)     Housing Stability Vital Sign     Unable to Pay for Housing in the Last Year: Yes     Unstable Housing in the Last Year: No       Precautions:     Allergies as of 04/10/2024    (No Known Allergies)       WOC Assessment Details/Treatment     Attempt to see pt for wound care consultation. -consult placed by MD for re assmt/ eval for sacrum and pelvis. Pt presently at dialysis. Will reutrn for assmt as ordered.    Reviewed chart for this encounter.   See Flow Sheet for findings.    Bedside nursing to continue care & monitoring.  Bedside nursing to maintain pressure injury prevention interventions.     06/21/24 1045   WOCN Assessment   WOCN Total Time (mins) 15   Visit Date 06/21/24   Visit Time 1045   Consult Type Follow Up   WOCN Speciality Wound   Intervention chart review   Teaching on-going  (pt OOR for dialysis. -family present in room discussed consult and need to return.)     Niki TIWARIN, RN, CWOCN  06/21/2024

## 2024-06-21 NOTE — ASSESSMENT & PLAN NOTE
Nutrition consulted. Most recent weight and BMI monitored-     Measurements:  Wt Readings from Last 1 Encounters:   06/19/24 105.5 kg (232 lb 9.4 oz)   Body mass index is 36.43 kg/m².    Patient has been screened and assessed by RD.    Malnutrition Type:  Context: acute illness or injury  Level: moderate    Malnutrition Characteristic Summary:  Weight Loss (Malnutrition): 10% in 6 months  Subcutaneous Fat (Malnutrition): mild depletion  Muscle Mass (Malnutrition): mild depletion  Fluid Accumulation (Malnutrition): mild    Interventions/Recommendations (treatment strategy):  - TF  - previous history of failed swallow studies

## 2024-06-21 NOTE — PT/OT/SLP PROGRESS
Physical Therapy Treatment    Patient Name:  Sarah Saravia   MRN:  [id number]    Recommendations:     Discharge Recommendations: Moderate Intensity Therapy  Discharge Equipment Recommendations: hospital bed, lift device, wheelchair  Barriers to discharge:  increased level of skilled assist    Assessment:     Sarah Saravia is a 53 y.o. female admitted with a medical diagnosis of Sepsis.  She presents with the following impairments/functional limitations: weakness, impaired endurance, impaired self care skills, impaired functional mobility, impaired balance, decreased lower extremity function, decreased safety awareness, decreased upper extremity function, impaired coordination Patient fatigued this date following HD. Indicates pain in RLE. Minimally participatory in EOB activity this date, although able to maintain upright posture with CGA to min A. Stands + scoots incorporated, supportive daughter present throughout session. Patient will continue to benefit from skilled PT during this admit to address BLE strength and endurance deficits, and maximize independence with functional mobility.    Rehab Prognosis: Fair; patient would benefit from acute skilled PT services to address these deficits and reach maximum level of function.    Recent Surgery: * No surgery found *      Plan:     During this hospitalization, patient to be seen 3 x/week to address the identified rehab impairments via gait training, therapeutic activities, therapeutic exercises, neuromuscular re-education and progress toward the following goals:    Plan of Care Expires:  07/22/24    Subjective     Chief Complaint: indicating R knee pain  Patient/Family Comments/goals: discharge  Pain/Comfort:  Pain Rating 1:  (unable to state, grimaces + clutches R knee)  Location - Side 1: Right  Location - Orientation 1: generalized  Location 1: knee  Pain Addressed 1: Nurse notified  Pain Rating Post-Intervention 1:  (pain indicated with  movement)      Objective:     Communicated with RN prior to session.  Patient found HOB elevated with PEG Tube, peripheral IV, telemetry, pressure relief boots upon PT entry to room.     General Precautions: Standard, aphasia, aspiration, NPO  Orthopedic Precautions: N/A  Braces: N/A  Respiratory Status: Room air     Functional Mobility:  Bed Mobility:     Rolling Left:  total assistance and of 2 persons  Rolling Right: total assistance and of 2 persons  Scooting: total assistance and of 2 persons  Supine to Sit: total assistance and of 2 persons  Sit to Supine: total assistance and of 2 persons  Transfers:     Sit to Stand:  total assistance and of 2 persons with no AD  Balance:   Sitting: CGA  with occasional min A and L lateral lean, noted fatigue  Standing: unable to obtain fully upright posture. B knee blocking enforced, multisensory cueing for gluteal + core musculature activation      AM-PAC 6 CLICK MOBILITY  Turning over in bed (including adjusting bedclothes, sheets and blankets)?: 2  Sitting down on and standing up from a chair with arms (e.g., wheelchair, bedside commode, etc.): 2  Moving from lying on back to sitting on the side of the bed?: 2  Moving to and from a bed to a chair (including a wheelchair)?: 1  Need to walk in hospital room?: 1  Climbing 3-5 steps with a railing?: 1  Basic Mobility Total Score: 9       Treatment & Education:  Patient educated on calling for assistance for any needs to improve overall safety awareness.  Patient educated on current level of function and progression towards therapeutic goals.  Patient soiled, incorporated bed mobility with cross-body reaching to improve reaching activity    Patient left HOB elevated with all lines intact, call button in reach, and RN + daughter present..    GOALS:   Multidisciplinary Problems       Physical Therapy Goals          Problem: Physical Therapy    Goal Priority Disciplines Outcome Goal Variances Interventions   Physical Therapy  Goal     PT, PT/OT Progressing     Description: Goals to be met by: 24   Goals remain appropriate 5/3/2024 to be met by 24  Goals updated 2024 to be met by 24  Goals updated 24 to be met by 24  Goals remain appropriate 24 to be met by 24    Patient will increase functional independence with mobility by performin. Supine to sit with Maximum Assistance  2. Sit to supine with Maximum Assistance  3. Rolling to Left and Right with Moderate Assistance.  4. Sitting at edge of bed 5 minutes with Moderate Assistance- MET , monitor consistency  4a. Sitting at edge of bed 10 minutes with Supervision  5. Lower extremity exercise program x20 reps per handout, with assistance as needed  6. Sit to stand transfer with Maximum Assistance with or without LRAD  7. Stand for 2 minutes with Maximum Assistance with or without LRAD                         Time Tracking:     PT Received On: 24  PT Start Time: 1349     PT Stop Time: 1419  PT Total Time (min): 30 min     Billable Minutes: Neuromuscular Re-education 30       PT/PTA: PT     Number of PTA visits since last PT visit: 0     2024

## 2024-06-21 NOTE — ASSESSMENT & PLAN NOTE
This patient does have evidence of infective focus. My overall impression is sepsis. Source: Skin and Soft Tissue (location Abdominal). Not requiring pressors. Source control achieved by: vanc/meropenem.    - Concerns septic source may be PEG site with associated purulent wound despite Wound Care attempts to protect with barriers. PEG placed by General Surgery 2/2024. General Surgery consulted regarding PEG site ordered CT abdomen. Currently in place.   - Continue meropenem/vanc for empiric 5 days course. Comprehensive skin assessment without skin involvement, perineum sutures still in place, but without signs of infection, removed. CT pan scan w/ IV contrast without evidence of infection  - US extremities negative for thrombus   - ID reconsulted for further assistance

## 2024-06-21 NOTE — ASSESSMENT & PLAN NOTE
Adjusting insulin to account for diabetic TF    Patient's FSGs are controlled on current medication regimen.  Last A1c reviewed-   Lab Results   Component Value Date    HGBA1C 6.2 (H) 05/27/2024     Most recent fingerstick glucose reviewed-   Recent Labs   Lab 06/20/24 2113   POCTGLUCOSE 110       Current correctional scale  Medium  Maintain anti-hyperglycemic dose as follows-   Antihyperglycemics (From admission, onward)    Start     Stop Route Frequency Ordered    06/09/24 2100  insulin glargine U-100 (Lantus) pen 13 Units         -- SubQ Nightly 06/09/24 0756    06/09/24 1255  insulin aspart U-100 pen 0-10 Units         -- SubQ Before meals & nightly PRN 06/09/24 1155        Hold Oral hypoglycemics while patient is in the hospital.  POCT glucose checks   Continue tube feeds

## 2024-06-21 NOTE — NURSING
Nurses Note -- 4 Eyes      6/21/2024   5:36 AM      Skin assessed during: Q Shift Change      [] No Altered Skin Integrity Present    []Prevention Measures Documented      [x] Yes- Altered Skin Integrity Present or Discovered   [] LDA Added if Not in Epic (Describe Wound)   [] New Altered Skin Integrity was Present on Admit and Documented in LDA   [] Wound Image Taken    Wound Care Consulted? No    Attending Nurse:  Nan Eastman RN/Staff Member:  Hina

## 2024-06-21 NOTE — ASSESSMENT & PLAN NOTE
On PEG tube.Wound care with PEG dressing changes.   - CT abdomen with PEG in correct location  - Tube feeds as toelrated

## 2024-06-21 NOTE — ASSESSMENT & PLAN NOTE
Creatine stable for now. BMP reviewed- noted Estimated Creatinine Clearance: 16.9 mL/min (A) (based on SCr of 4.8 mg/dL (H)). according to latest data. Based on current GFR, CKD stage is end stage.  Monitor UOP and serial BMP and adjust therapy as needed. Renally dose meds. Avoid nephrotoxic medications and procedures.    -- HD MWF (~1L fluid removal on average per session)  -- nephro following  -- Hypophosphatemic on sevelamer  -- SW onboard for placement to [facility] to continue dialysis. Once patient gets accepted at [facility], next step will be to work with daughter on retirement NH placement.

## 2024-06-21 NOTE — PROGRESS NOTES
[facility] NOTE     Patient currently on hemodialysis for removal of uremic toxins .     Patient seen and evaluated on hemodialysis, tolerating treatment, see HD flowsheet for vitals and assessments.      No Hypotension, chest pain, shortness of breath, cramping, nausea or vomiting.     Target UF: 2 L as tolerated, keep MAP >65.  ID following, fever and leukocytosis resolved. No distress on exam.   Continue EPO  No binders   Consider renal diet restrictions; please limit daily intake to 32 oz per day.   No lab stick/BP intake on access site  Continue to monitor intake and output, daily weights   Please avoid gadolinium, fleets, phos-based laxatives, NSAIDs  Will follow closely and continue dialysis treatments while in-patient    SHERLY Gregory DNP, APRN, FNP-C  Department of Nephrology  [facility]  Pager: [phone]

## 2024-06-21 NOTE — PROGRESS NOTES
Patient arrived in the acute dialysis unit by bed. Maintenance dialysis started via right IJ tunneled CVC. Isolation precautions maintained.

## 2024-06-21 NOTE — SUBJECTIVE & OBJECTIVE
Interval History: NAEO, VSS, patient to continue PT/OT evaluation and antibiotic therapy, to finish tomorrow.    Review of Systems   Unable to perform ROS: Patient nonverbal     Objective:     Vital Signs (Most Recent):  Temp: 98.4 °F (36.9 °C) (06/21/24 1243)  Pulse: (!) 115 (06/21/24 1243)  Resp: 15 (06/21/24 1243)  BP: 117/78 (06/21/24 1243)  SpO2: 98 % (06/21/24 1243) Vital Signs (24h Range):  Temp:  [98 °F (36.7 °C)-98.7 °F (37.1 °C)] 98.4 °F (36.9 °C)  Pulse:  [] 115  Resp:  [13-18] 15  SpO2:  [98 %-100 %] 98 %  BP: (113-180)/(64-93) 117/78     Weight: 105.5 kg (232 lb 9.4 oz)  Body mass index is 36.43 kg/m².  No intake or output data in the 24 hours ending 06/21/24 1309      Physical Exam  Nursing note reviewed.   Constitutional:       General: She is not in acute distress.     Appearance: She is obese. She is not toxic-appearing.   HENT:      Head: Normocephalic and atraumatic.   Eyes:      General: No scleral icterus.     Conjunctiva/sclera: Conjunctivae normal.   Cardiovascular:      Rate and Rhythm: Regular rhythm. Tachycardia present.   Pulmonary:      Effort: Pulmonary effort is normal. No respiratory distress.   Abdominal:      General: There is no distension.      Palpations: Abdomen is soft.      Comments: G tube   Skin:     General: Skin is warm and dry.      Comments: R chest HD line w/o evidence of infection    Neurological:      Mental Status: She is alert.      Comments: Awake and alert. Following few commands. Non verbal             Significant Labs: All pertinent labs within the past 24 hours have been reviewed.    Significant Imaging: I have reviewed all pertinent imaging results/findings within the past 24 hours.

## 2024-06-21 NOTE — NURSING
Report received from HEATH Fischer. Patient remains free from falls and injury. NADN. VSS. Patient is nonverbal but alert. Bed locked and in low position; safety maintained. Call light in reach. White board updated, and explained. No complaint of pain, n/v, diarrhea, or SOB.  Will continue to monitor, will continue with plan of care.       Patient off the unit per transport to dialysis.

## 2024-06-21 NOTE — PROGRESS NOTES
Dialysis completed. Right IJ tunneled CVC flushed, heparinized, capped and ends wrapped with sterile gauze. Dialyzed for 3 hours with fluid removal of 1.5 liters. Tolerated well with stable vital signs. Returned to her room by bed.

## 2024-06-22 LAB
ALBUMIN SERPL BCP-MCNC: 2.8 G/DL (ref 3.5–5.2)
ALP SERPL-CCNC: 72 U/L (ref 55–135)
ALT SERPL W/O P-5'-P-CCNC: 11 U/L (ref 10–44)
ANION GAP SERPL CALC-SCNC: 11 MMOL/L (ref 8–16)
AST SERPL-CCNC: 17 U/L (ref 10–40)
BACTERIA #/AREA URNS AUTO: ABNORMAL /HPF
BASOPHILS # BLD AUTO: 0.08 K/UL (ref 0–0.2)
BASOPHILS # BLD AUTO: 0.08 K/UL (ref 0–0.2)
BASOPHILS NFR BLD: 0.4 % (ref 0–1.9)
BASOPHILS NFR BLD: 0.5 % (ref 0–1.9)
BILIRUB SERPL-MCNC: 0.4 MG/DL (ref 0.1–1)
BILIRUB UR QL STRIP: NEGATIVE
BUN SERPL-MCNC: 16 MG/DL (ref 6–20)
CALCIUM SERPL-MCNC: 9.4 MG/DL (ref 8.7–10.5)
CHLORIDE SERPL-SCNC: 101 MMOL/L (ref 95–110)
CLARITY UR REFRACT.AUTO: ABNORMAL
CO2 SERPL-SCNC: 21 MMOL/L (ref 23–29)
COLOR UR AUTO: YELLOW
CREAT SERPL-MCNC: 4.7 MG/DL (ref 0.5–1.4)
DIFFERENTIAL METHOD BLD: ABNORMAL
DIFFERENTIAL METHOD BLD: ABNORMAL
EOSINOPHIL # BLD AUTO: 0.3 K/UL (ref 0–0.5)
EOSINOPHIL # BLD AUTO: 0.3 K/UL (ref 0–0.5)
EOSINOPHIL NFR BLD: 1.8 % (ref 0–8)
EOSINOPHIL NFR BLD: 2.1 % (ref 0–8)
ERYTHROCYTE [DISTWIDTH] IN BLOOD BY AUTOMATED COUNT: 16.6 % (ref 11.5–14.5)
ERYTHROCYTE [DISTWIDTH] IN BLOOD BY AUTOMATED COUNT: 16.7 % (ref 11.5–14.5)
EST. GFR  (NO RACE VARIABLE): 10.5 ML/MIN/1.73 M^2
GLUCOSE SERPL-MCNC: 139 MG/DL (ref 70–110)
GLUCOSE UR QL STRIP: NEGATIVE
HCT VFR BLD AUTO: 30.9 % (ref 37–48.5)
HCT VFR BLD AUTO: 31.8 % (ref 37–48.5)
HGB BLD-MCNC: 9.1 G/DL (ref 12–16)
HGB BLD-MCNC: 9.5 G/DL (ref 12–16)
HGB UR QL STRIP: ABNORMAL
HYALINE CASTS UR QL AUTO: 0 /LPF
IMM GRANULOCYTES # BLD AUTO: 0.09 K/UL (ref 0–0.04)
IMM GRANULOCYTES # BLD AUTO: 0.12 K/UL (ref 0–0.04)
IMM GRANULOCYTES NFR BLD AUTO: 0.6 % (ref 0–0.5)
IMM GRANULOCYTES NFR BLD AUTO: 0.6 % (ref 0–0.5)
KETONES UR QL STRIP: NEGATIVE
LEUKOCYTE ESTERASE UR QL STRIP: ABNORMAL
LYMPHOCYTES # BLD AUTO: 2 K/UL (ref 1–4.8)
LYMPHOCYTES # BLD AUTO: 2.6 K/UL (ref 1–4.8)
LYMPHOCYTES NFR BLD: 12.9 % (ref 18–48)
LYMPHOCYTES NFR BLD: 13.7 % (ref 18–48)
MAGNESIUM SERPL-MCNC: 2.1 MG/DL (ref 1.6–2.6)
MCH RBC QN AUTO: 24.2 PG (ref 27–31)
MCH RBC QN AUTO: 24.5 PG (ref 27–31)
MCHC RBC AUTO-ENTMCNC: 29.4 G/DL (ref 32–36)
MCHC RBC AUTO-ENTMCNC: 29.9 G/DL (ref 32–36)
MCV RBC AUTO: 82 FL (ref 82–98)
MCV RBC AUTO: 82 FL (ref 82–98)
MICROSCOPIC COMMENT: ABNORMAL
MONOCYTES # BLD AUTO: 1.3 K/UL (ref 0.3–1)
MONOCYTES # BLD AUTO: 1.5 K/UL (ref 0.3–1)
MONOCYTES NFR BLD: 7.9 % (ref 4–15)
MONOCYTES NFR BLD: 8.4 % (ref 4–15)
NEUTROPHILS # BLD AUTO: 11.8 K/UL (ref 1.8–7.7)
NEUTROPHILS # BLD AUTO: 14.3 K/UL (ref 1.8–7.7)
NEUTROPHILS NFR BLD: 75.5 % (ref 38–73)
NEUTROPHILS NFR BLD: 75.6 % (ref 38–73)
NITRITE UR QL STRIP: NEGATIVE
NRBC BLD-RTO: 0 /100 WBC
NRBC BLD-RTO: 0 /100 WBC
PATH REV BLD -IMP: NORMAL
PH UR STRIP: 6 [PH] (ref 5–8)
PHOSPHATE SERPL-MCNC: 3.1 MG/DL (ref 2.7–4.5)
PLATELET # BLD AUTO: 402 K/UL (ref 150–450)
PLATELET # BLD AUTO: 408 K/UL (ref 150–450)
PMV BLD AUTO: 9.2 FL (ref 9.2–12.9)
PMV BLD AUTO: 9.3 FL (ref 9.2–12.9)
POCT GLUCOSE: 125 MG/DL (ref 70–110)
POTASSIUM SERPL-SCNC: 4.3 MMOL/L (ref 3.5–5.1)
PROT SERPL-MCNC: 7.7 G/DL (ref 6–8.4)
PROT UR QL STRIP: ABNORMAL
RBC # BLD AUTO: 3.76 M/UL (ref 4–5.4)
RBC # BLD AUTO: 3.88 M/UL (ref 4–5.4)
RBC #/AREA URNS AUTO: 8 /HPF (ref 0–4)
SODIUM SERPL-SCNC: 133 MMOL/L (ref 136–145)
SP GR UR STRIP: 1.02 (ref 1–1.03)
SQUAMOUS #/AREA URNS AUTO: >100 /HPF
URN SPEC COLLECT METH UR: ABNORMAL
WBC # BLD AUTO: 15.56 K/UL (ref 3.9–12.7)
WBC # BLD AUTO: 18.94 K/UL (ref 3.9–12.7)
WBC #/AREA URNS AUTO: >100 /HPF (ref 0–5)

## 2024-06-22 PROCEDURE — 25000003 PHARM REV CODE 250

## 2024-06-22 PROCEDURE — 27000207 HC ISOLATION

## 2024-06-22 PROCEDURE — 85060 BLOOD SMEAR INTERPRETATION: CPT | Mod: ,,, | Performed by: PATHOLOGY

## 2024-06-22 PROCEDURE — 63600175 PHARM REV CODE 636 W HCPCS: Performed by: FAMILY MEDICINE

## 2024-06-22 PROCEDURE — 25000003 PHARM REV CODE 250: Performed by: FAMILY MEDICINE

## 2024-06-22 PROCEDURE — 85025 COMPLETE CBC W/AUTO DIFF WBC: CPT | Mod: 91

## 2024-06-22 PROCEDURE — 36415 COLL VENOUS BLD VENIPUNCTURE: CPT

## 2024-06-22 PROCEDURE — 80053 COMPREHEN METABOLIC PANEL: CPT

## 2024-06-22 PROCEDURE — 63600175 PHARM REV CODE 636 W HCPCS

## 2024-06-22 PROCEDURE — 81001 URINALYSIS AUTO W/SCOPE: CPT | Performed by: STUDENT IN AN ORGANIZED HEALTH CARE EDUCATION/TRAINING PROGRAM

## 2024-06-22 PROCEDURE — 83735 ASSAY OF MAGNESIUM: CPT

## 2024-06-22 PROCEDURE — 84100 ASSAY OF PHOSPHORUS: CPT

## 2024-06-22 PROCEDURE — 25000003 PHARM REV CODE 250: Performed by: STUDENT IN AN ORGANIZED HEALTH CARE EDUCATION/TRAINING PROGRAM

## 2024-06-22 PROCEDURE — 85025 COMPLETE CBC W/AUTO DIFF WBC: CPT

## 2024-06-22 PROCEDURE — 87086 URINE CULTURE/COLONY COUNT: CPT | Performed by: STUDENT IN AN ORGANIZED HEALTH CARE EDUCATION/TRAINING PROGRAM

## 2024-06-22 PROCEDURE — 20600001 HC STEP DOWN PRIVATE ROOM

## 2024-06-22 RX ORDER — SODIUM CHLORIDE 9 MG/ML
INJECTION, SOLUTION INTRAVENOUS ONCE
Status: COMPLETED | OUTPATIENT
Start: 2024-06-24 | End: 2024-06-24

## 2024-06-22 RX ADMIN — HEPARIN SODIUM 5000 UNITS: 5000 INJECTION INTRAVENOUS; SUBCUTANEOUS at 10:06

## 2024-06-22 RX ADMIN — POLYETHYLENE GLYCOL 3350 17 G: 17 POWDER, FOR SOLUTION ORAL at 10:06

## 2024-06-22 RX ADMIN — Medication 500 MG: at 10:06

## 2024-06-22 RX ADMIN — MEROPENEM 500 MG: 500 INJECTION INTRAVENOUS at 10:06

## 2024-06-22 RX ADMIN — HEPARIN SODIUM 5000 UNITS: 5000 INJECTION INTRAVENOUS; SUBCUTANEOUS at 06:06

## 2024-06-22 RX ADMIN — POLYETHYLENE GLYCOL 3350 17 G: 17 POWDER, FOR SOLUTION ORAL at 09:06

## 2024-06-22 RX ADMIN — METOPROLOL TARTRATE 25 MG: 25 TABLET, FILM COATED ORAL at 10:06

## 2024-06-22 RX ADMIN — INSULIN GLARGINE 13 UNITS: 100 INJECTION, SOLUTION SUBCUTANEOUS at 10:06

## 2024-06-22 RX ADMIN — PANTOPRAZOLE SODIUM 40 MG: 40 GRANULE, DELAYED RELEASE ORAL at 09:06

## 2024-06-22 RX ADMIN — Medication 500 MG: at 09:06

## 2024-06-22 RX ADMIN — METOPROLOL TARTRATE 25 MG: 25 TABLET, FILM COATED ORAL at 09:06

## 2024-06-22 RX ADMIN — ACETAMINOPHEN 650 MG: 650 SOLUTION ORAL at 10:06

## 2024-06-22 RX ADMIN — ATORVASTATIN CALCIUM 40 MG: 40 TABLET, FILM COATED ORAL at 09:06

## 2024-06-22 RX ADMIN — ASPIRIN 81 MG CHEWABLE TABLET 81 MG: 81 TABLET CHEWABLE at 09:06

## 2024-06-22 RX ADMIN — POLYETHYLENE GLYCOL 3350 17 G: 17 POWDER, FOR SOLUTION ORAL at 03:06

## 2024-06-22 NOTE — ASSESSMENT & PLAN NOTE
"Adjusting insulin to account for diabetic TF    Patient's FSGs are controlled on current medication regimen.  Last A1c reviewed-   Lab Results   Component Value Date    HGBA1C 6.2 (H) 05/27/2024     Most recent fingerstick glucose reviewed-   No results for input(s): "POCTGLUCOSE" in the last 24 hours.    Current correctional scale  Medium  Maintain anti-hyperglycemic dose as follows-   Antihyperglycemics (From admission, onward)    Start     Stop Route Frequency Ordered    06/09/24 2100  insulin glargine U-100 (Lantus) pen 13 Units         -- SubQ Nightly 06/09/24 0756    06/09/24 1255  insulin aspart U-100 pen 0-10 Units         -- SubQ Before meals & nightly PRN 06/09/24 1155        Hold Oral hypoglycemics while patient is in the hospital.  POCT glucose checks   Continue tube feeds  "

## 2024-06-22 NOTE — NURSING
Nurses Note -- 4 Eyes      6/21/2024   11:56 PM      Skin assessed during: Q Shift Change      [] No Altered Skin Integrity Present    []Prevention Measures Documented      [x] Yes- Altered Skin Integrity Present or Discovered   [] LDA Added if Not in Epic (Describe Wound)   [] New Altered Skin Integrity was Present on Admit and Documented in LDA   [] Wound Image Taken    Wound Care Consulted? No    Attending Nurse:  Nan Eastman RN/Staff Member:  Brain

## 2024-06-22 NOTE — PROGRESS NOTES
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 History was given from patient's daughter. She was undergoing dialysis today and noticed she was lethargic, less alert than usual self. Pt completed dialysis and still not acting herself. EMS was called, fever of a 100.  On chart review, she did have an episode of large volume emesis around 1700.  Per EMS, she had a slightly elevated temp 100.0°F, glucose 300s.     In the ED: UA 2+ leuks, >100 WBC, many bacteria, WBC 17, CT abd/pelvis concerning for cystitis. Given vanc/zosyn    Overview/Hospital Course:  53 JARROD admitted to Hospital Medicine service for urosepsis. Vanc/zosyn initiated, found to have staph epi in all 4 bottles, vanc/ertapenem course completed per ID. Vascular Neurology consulted concerning mental status change with the recommendation to initiate ASA 81 QD and to obtain an MRI to r/o new stroke. Per discussion with vascular neurology, MRI findings appear to be expected changes from prior stroke. Nephrology was consulted for regularly scheduled dialysis. Pulm consulted, patient decannulated 4/30. Failed swallow assessment, continuing tube feeds. Ongoing placement difficulties d/t need for accepting HD facility to have sandee lift with 2 personnel to operate. Once patient gets accepted at [facility], next step will be to work with daughter on [facility]. SW onboard working on paperwork for long term Medicaid application. H/c c/b new fever, ID reconsulted, CT chest showing bilateral ground glass opacities,  Vanc/meropenem course to be completed 6/17, however patient had further episode of fever and will need continued merem and ID consulted for assistance. CT pan scan to assess for infection. Imaging without signs of infection, patient to complete empiric abx for 5 days. Patient continues fevering despite broad abx, no identified source. Chest x-ray, peripheral smear to assess for other sources.    Interval History: Patient w/ leukocytosis, fever curve uptrending. Patient continues on abx.    Review of Systems   Unable to perform ROS: Patient nonverbal     Objective:     Vital Signs (Most Recent):  Temp: 100.1 °F (37.8 °C) (06/22/24 0718)  Pulse: 110 (06/22/24 0718)  Resp: 17 (06/22/24 0718)  BP: 117/75 (06/22/24 0718)  SpO2: 99 % (06/22/24 0718) Vital Signs (24h Range):  Temp:  [98 °F (36.7 °C)-100.1 °F (37.8 °C)] 100.1 °F (37.8 °C)  Pulse:  [] 110  Resp:  [15-18] 17  SpO2:  [98 %-100 %] 99 %  BP: (108-176)/(68-93) 117/75     Weight: 105.5 kg (232 lb 9.4 oz)  Body mass index is 36.43 kg/m².    Intake/Output Summary (Last 24 hours) at 6/22/2024 0801  Last data filed at 6/21/2024 1115  Gross per 24 hour   Intake --   Output 2100 ml   Net -2100 ml         Physical Exam  Constitutional:       Appearance: Normal appearance. She is not ill-appearing.   HENT:      Head: Normocephalic and atraumatic.      Right Ear: External ear normal.      Left Ear: External ear normal.   Cardiovascular:      Rate and Rhythm: Normal rate and regular rhythm.      Pulses: Normal pulses.      Heart sounds: Normal heart sounds.   Pulmonary:      Effort: Pulmonary effort is normal.      Breath sounds: Normal breath sounds.   Abdominal:      General: Abdomen is flat.   Musculoskeletal:         General: Normal range of motion.   Skin:     General: Skin is warm and dry.      Capillary Refill: Capillary refill takes less than 2 seconds.   Neurological:      Mental Status: Mental status is at baseline.             Significant Labs: All  pertinent labs within the past 24 hours have been reviewed.  CBC:   Recent Labs   Lab 06/21/24  0830 06/22/24  0539   WBC 7.25 18.94*   HGB 8.6* 9.5*   HCT 28.8* 31.8*    402     CMP:   Recent Labs   Lab 06/21/24  0830 06/22/24  0539   * 133*   K 3.9 4.3   CL 96 101   CO2 24 21*   GLU 95 139*   BUN 17 16   CREATININE 4.8* 4.7*   CALCIUM 9.4 9.4   PROT 7.9 7.7   ALBUMIN 2.8* 2.8*   BILITOT 0.3 0.4   ALKPHOS 70 72   AST 18 17   ALT 12 11   ANIONGAP 14 11       Significant Imaging: I have reviewed all pertinent imaging results/findings within the past 24 hours.    Assessment/Plan:      * Sepsis  This patient does have evidence of infective focus. My overall impression is sepsis. Source: Skin and Soft Tissue (location Abdominal). Not requiring pressors. Source control achieved by: vanc/meropenem.    - Concerns septic source may be PEG site with associated purulent wound despite Wound Care attempts to protect with barriers. PEG placed by General Surgery 2/2024. General Surgery consulted regarding PEG site ordered CT abdomen. Currently in place.   - Continue meropenem/vanc. Peripheral smear given cyclic nature of fevers/leukocytosis. Chest x-ray. CT pan scan w/ IV contrast without evidence of infection  - US extremities negative for thrombus   - ID reconsulted for further assistance    Cervical stenosis of spinal canal  Outpatient MRI and NSGY f/up      Vulvovaginal candidiasis  Noted 5/29, given 150mg fluconazole x1      Irritant contact dermatitis due friction or contact with other specified body fluids  Wound care following       Debility  Needs:  Wheelchair: patient has a mobility limitation that significantly impairs her ability to participate in one or more mobility related activities of daily living (MRADL's) such as toileting, feeding, dressing, grooming, and bathing in customary locations in the home.  The mobility limitation cannot be sufficiently resolved by the use of a cane or walker.   The use  of a manual wheelchair will significantly improve the patient's ability to participate in MRADLS and the patient will use it on regular basis in the home.  Family/pt has expressed her willingness to use a manual wheelchair in the home.  She also has a caregiver who is capable of assisting in mobility.    Mrs Saravia requires a hospital bed due to her requiring positioning of the body in ways not feasible with an ordinary bed to alleviate pain/ is completely immobile /or limited mobility and cannot independently make changes in body position without the use of the bed.  The positioning of the body cannot be sufficiently resolved by the use of pillows and wedges    Patient has a mobility limitation that significantly impairs their ability to participate in one or more mobility related activities of daily living, including toileting. This deficit can be resolved by using a bedside commode. Patient demonstrates mobility limitations that will cause them to be confined to one room at home without bathroom access for up to 30 days. Using a bedside commode will greatly improve the patient's ability to participate in MRADLs       Complication of vascular dialysis catheter  Cath intermittently clogging, not resolving with cath-hedy, going for IR venogram 4/30 with possible exchange. S/p exchange with IR on 4/30      Constipation  Stool burden on CT    -continue bowel reg  -monitor for BMs    Moderate malnutrition  Nutrition consulted. Most recent weight and BMI monitored-     Measurements:  Wt Readings from Last 1 Encounters:   06/19/24 105.5 kg (232 lb 9.4 oz)   Body mass index is 36.43 kg/m².    Patient has been screened and assessed by RD.    Malnutrition Type:  Context: acute illness or injury  Level: moderate    Malnutrition Characteristic Summary:  Weight Loss (Malnutrition): 10% in 6 months  Subcutaneous Fat (Malnutrition): mild depletion  Muscle Mass (Malnutrition): mild depletion  Fluid Accumulation (Malnutrition):  "mild    Interventions/Recommendations (treatment strategy):  - TF  - previous history of failed swallow studies    Prolonged QT interval  Limit Qtc prolonging drugs as able    Spastic hemiplegia of right dominant side as late effect of cerebrovascular disease  Important that patient is able to sit in chair for 4h for dialysis placement needs, even if she requires lift/assistance getting into the chair.This patient has Chronic right hemiplegia due to stroke. Physical therapy services has been scheduled. Continue all standard measures for pressure injury prevention and consult wound care for any wounds (chronic or acute).    ESRD (end stage renal disease) on dialysis  Creatine stable for now. BMP reviewed- noted Estimated Creatinine Clearance: 16.9 mL/min (A) (based on SCr of 4.8 mg/dL (H)). according to latest data. Based on current GFR, CKD stage is end stage.  Monitor UOP and serial BMP and adjust therapy as needed. Renally dose meds. Avoid nephrotoxic medications and procedures.    -- HD MWF (~1L fluid removal on average per session)  -- nephro following  -- Hypophosphatemic on sevelamer  -- SW onboard for placement to [facility] to continue dialysis. Once patient gets accepted at [facility], next step will be to work with daughter on halfway NH placement.     PEG (percutaneous endoscopic gastrostomy) status  On PEG tube.Wound care with PEG dressing changes.   - CT abdomen with PEG in correct location  - Tube feeds as toelrated    Leukocytosis  See "Sepsis"      Type 2 diabetes mellitus with hyperglycemia, with long-term current use of insulin  Adjusting insulin to account for diabetic TF    Patient's FSGs are controlled on current medication regimen.  Last A1c reviewed-   Lab Results   Component Value Date    HGBA1C 6.2 (H) 05/27/2024     Most recent fingerstick glucose reviewed-   No results for input(s): "POCTGLUCOSE" in the last 24 hours.    Current correctional scale  Medium  Maintain " anti-hyperglycemic dose as follows-   Antihyperglycemics (From admission, onward)      Start     Stop Route Frequency Ordered    06/09/24 2100  insulin glargine U-100 (Lantus) pen 13 Units         -- SubQ Nightly 06/09/24 0756    06/09/24 1255  insulin aspart U-100 pen 0-10 Units         -- SubQ Before meals & nightly PRN 06/09/24 1155          Hold Oral hypoglycemics while patient is in the hospital.  POCT glucose checks   Continue tube feeds    Hyponatremia  Patient has hyponatremia which is uncontrolled,We will aim to correct the sodium by 4-6mEq in 24 hours. We will monitor sodium Daily. The hyponatremia is due to ESRD. Discussed with nephrology, dialysate bath adjusted to account for and correct hyponatremia.  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 Pt underwent extensive course, currently still healing from this, but no longer has wound vac. Picture in media tabs    - Wound care while inpatient  - NH with wound care orders        VTE Risk Mitigation (From admission, onward)           Ordered     heparin (porcine) injection 1,000 Units  As needed (PRN)         06/21/24 0828     heparin (porcine) injection 1,000 Units  As needed (PRN)         06/10/24 0035     heparin (porcine) injection 5,000 Units  Every 8 hours         05/29/24 0823     heparin (porcine) injection 3,000 Units  As needed (PRN)         04/17/24 0825                    Discharge Planning   ZEKE: 6/24/2024     Code Status: Full Code   Is the patient medically ready for discharge?: No    Reason for patient still in hospital (select all that apply): Patient trending condition  Discharge Plan A: New Nursing Home placement - alf care facility                  Jatin Hutchins MD  Department of Hospital Medicine   [facility] - Telemetry Stepdown

## 2024-06-22 NOTE — ASSESSMENT & PLAN NOTE
This patient does have evidence of infective focus. My overall impression is sepsis. Source: Skin and Soft Tissue (location Abdominal). Not requiring pressors. Source control achieved by: vanc/meropenem.    - Concerns septic source may be PEG site with associated purulent wound despite Wound Care attempts to protect with barriers. PEG placed by General Surgery 2/2024. General Surgery consulted regarding PEG site ordered CT abdomen. Currently in place.   - Continue meropenem/vanc. Peripheral smear given cyclic nature of fevers/leukocytosis. Chest x-ray. CT pan scan w/ IV contrast without evidence of infection  - US extremities negative for thrombus   - ID reconsulted for further assistance

## 2024-06-22 NOTE — PLAN OF CARE
Problem: Infection  Goal: Absence of Infection Signs and Symptoms  Outcome: Progressing     Problem: Skin Injury Risk Increased  Goal: Skin Health and Integrity  Outcome: Progressing     Problem: Adult Inpatient Plan of Care  Goal: Plan of Care Review  Outcome: Progressing     Problem: Chronic Kidney Disease  Goal: Optimal Coping with Chronic Illness  Outcome: Progressing  Goal: Electrolyte Balance  Outcome: Progressing  Goal: Fluid Balance  Outcome: Progressing  Goal: Optimal Functional Ability  Outcome: Progressing  Goal: Absence of Anemia Signs and Symptoms  Outcome: Progressing  Goal: Optimal Oral Intake  Outcome: Progressing

## 2024-06-22 NOTE — SUBJECTIVE & OBJECTIVE
Interval History: Patient w/ leukocytosis, fever curve uptrending. Patient continues on abx.    Review of Systems   Unable to perform ROS: Patient nonverbal     Objective:     Vital Signs (Most Recent):  Temp: 100.1 °F (37.8 °C) (06/22/24 0718)  Pulse: 110 (06/22/24 0718)  Resp: 17 (06/22/24 0718)  BP: 117/75 (06/22/24 0718)  SpO2: 99 % (06/22/24 0718) Vital Signs (24h Range):  Temp:  [98 °F (36.7 °C)-100.1 °F (37.8 °C)] 100.1 °F (37.8 °C)  Pulse:  [] 110  Resp:  [15-18] 17  SpO2:  [98 %-100 %] 99 %  BP: (108-176)/(68-93) 117/75     Weight: 105.5 kg (232 lb 9.4 oz)  Body mass index is 36.43 kg/m².    Intake/Output Summary (Last 24 hours) at 6/22/2024 0801  Last data filed at 6/21/2024 1115  Gross per 24 hour   Intake --   Output 2100 ml   Net -2100 ml         Physical Exam  Constitutional:       Appearance: Normal appearance. She is not ill-appearing.   HENT:      Head: Normocephalic and atraumatic.      Right Ear: External ear normal.      Left Ear: External ear normal.   Cardiovascular:      Rate and Rhythm: Normal rate and regular rhythm.      Pulses: Normal pulses.      Heart sounds: Normal heart sounds.   Pulmonary:      Effort: Pulmonary effort is normal.      Breath sounds: Normal breath sounds.   Abdominal:      General: Abdomen is flat.   Musculoskeletal:         General: Normal range of motion.   Skin:     General: Skin is warm and dry.      Capillary Refill: Capillary refill takes less than 2 seconds.   Neurological:      Mental Status: Mental status is at baseline.             Significant Labs: All pertinent labs within the past 24 hours have been reviewed.  CBC:   Recent Labs   Lab 06/21/24  0830 06/22/24  0539   WBC 7.25 18.94*   HGB 8.6* 9.5*   HCT 28.8* 31.8*    402     CMP:   Recent Labs   Lab 06/21/24  0830 06/22/24  0539   * 133*   K 3.9 4.3   CL 96 101   CO2 24 21*   GLU 95 139*   BUN 17 16   CREATININE 4.8* 4.7*   CALCIUM 9.4 9.4   PROT 7.9 7.7   ALBUMIN 2.8* 2.8*   BILITOT  0.3 0.4   ALKPHOS 70 72   AST 18 17   ALT 12 11   ANIONGAP 14 11       Significant Imaging: I have reviewed all pertinent imaging results/findings within the past 24 hours.

## 2024-06-22 NOTE — PLAN OF CARE
[facility] - Telemetry Stepdown  Discharge Reassessment    Primary Care Provider: No, Primary Doctor    Expected Discharge Date: 6/24/2024        [facility] - Telemetry Stepdown  Discharge Reassessment    Primary Care Provider: No, Primary Doctor    Expected Discharge Date: 6/24/2024    Reassessment (most recent)       Discharge Reassessment - 06/21/24 1910          Discharge Reassessment    Assessment Type Discharge Planning Reassessment     Did the patient's condition or plan change since previous assessment? No     Discharge Plan discussed with: Adult children     Communicated ZEKE with patient/caregiver Yes     Discharge Plan A New Nursing Home placement - [facility] care facility     Discharge Plan B New Nursing Home placement - [facility] care facility     DME Needed Upon Discharge  other (see comments)   TBD    Why the patient remains in the hospital Requires continued medical care        Post-Acute Status    Post-Acute Authorization Placement     Post-Acute Placement Status Pending medical clearance/testing                   Discharge Plan A and Plan B have been determined by review of patient's clinical status, future medical and therapeutic needs, and coverage/benefits for post-acute care in coordination with multidisciplinary team members.     Sara Loredo RN

## 2024-06-22 NOTE — ASSESSMENT & PLAN NOTE
Patient has hyponatremia which is uncontrolled,We will aim to correct the sodium by 4-6mEq in 24 hours. We will monitor sodium Daily. The hyponatremia is due to ESRD. Discussed with nephrology, dialysate bath adjusted to account for and correct hyponatremia.  Recent Labs   Lab 06/22/24  0539   *

## 2024-06-23 LAB
ALBUMIN SERPL BCP-MCNC: 2.9 G/DL (ref 3.5–5.2)
ALP SERPL-CCNC: 71 U/L (ref 55–135)
ALT SERPL W/O P-5'-P-CCNC: 10 U/L (ref 10–44)
ANION GAP SERPL CALC-SCNC: 17 MMOL/L (ref 8–16)
ANISOCYTOSIS BLD QL SMEAR: SLIGHT
AST SERPL-CCNC: 17 U/L (ref 10–40)
BACTERIA BLD CULT: NORMAL
BACTERIA BLD CULT: NORMAL
BACTERIA UR CULT: NORMAL
BACTERIA UR CULT: NORMAL
BASOPHILS # BLD AUTO: 0.07 K/UL (ref 0–0.2)
BASOPHILS NFR BLD: 0.9 % (ref 0–1.9)
BILIRUB SERPL-MCNC: 0.3 MG/DL (ref 0.1–1)
BUN SERPL-MCNC: 23 MG/DL (ref 6–20)
CALCIUM SERPL-MCNC: 10.3 MG/DL (ref 8.7–10.5)
CHLORIDE SERPL-SCNC: 104 MMOL/L (ref 95–110)
CO2 SERPL-SCNC: 15 MMOL/L (ref 23–29)
CREAT SERPL-MCNC: 6.3 MG/DL (ref 0.5–1.4)
CRP SERPL-MCNC: 51.4 MG/L (ref 0–8.2)
DIFFERENTIAL METHOD BLD: ABNORMAL
EOSINOPHIL # BLD AUTO: 0.4 K/UL (ref 0–0.5)
EOSINOPHIL NFR BLD: 4.6 % (ref 0–8)
ERYTHROCYTE [DISTWIDTH] IN BLOOD BY AUTOMATED COUNT: 16.6 % (ref 11.5–14.5)
EST. GFR  (NO RACE VARIABLE): 7.4 ML/MIN/1.73 M^2
GLUCOSE SERPL-MCNC: 103 MG/DL (ref 70–110)
HCT VFR BLD AUTO: 35 % (ref 37–48.5)
HGB BLD-MCNC: 9.4 G/DL (ref 12–16)
HYPOCHROMIA BLD QL SMEAR: ABNORMAL
IMM GRANULOCYTES # BLD AUTO: 0.11 K/UL (ref 0–0.04)
IMM GRANULOCYTES NFR BLD AUTO: 1.4 % (ref 0–0.5)
LYMPHOCYTES # BLD AUTO: 1.7 K/UL (ref 1–4.8)
LYMPHOCYTES NFR BLD: 22.4 % (ref 18–48)
MAGNESIUM SERPL-MCNC: 2.3 MG/DL (ref 1.6–2.6)
MCH RBC QN AUTO: 24.4 PG (ref 27–31)
MCHC RBC AUTO-ENTMCNC: 26.9 G/DL (ref 32–36)
MCV RBC AUTO: 91 FL (ref 82–98)
MONOCYTES # BLD AUTO: 0.8 K/UL (ref 0.3–1)
MONOCYTES NFR BLD: 10.8 % (ref 4–15)
NEUTROPHILS # BLD AUTO: 4.7 K/UL (ref 1.8–7.7)
NEUTROPHILS NFR BLD: 59.9 % (ref 38–73)
NRBC BLD-RTO: 0 /100 WBC
OVALOCYTES BLD QL SMEAR: ABNORMAL
PHOSPHATE SERPL-MCNC: 4.1 MG/DL (ref 2.7–4.5)
PLATELET # BLD AUTO: 402 K/UL (ref 150–450)
PLATELET BLD QL SMEAR: ABNORMAL
PMV BLD AUTO: 9.6 FL (ref 9.2–12.9)
POCT GLUCOSE: 107 MG/DL (ref 70–110)
POIKILOCYTOSIS BLD QL SMEAR: SLIGHT
POLYCHROMASIA BLD QL SMEAR: ABNORMAL
POTASSIUM SERPL-SCNC: 4.2 MMOL/L (ref 3.5–5.1)
PROCALCITONIN SERPL IA-MCNC: 9.48 NG/ML
PROCALCITONIN SERPL IA-MCNC: 9.48 NG/ML
PROT SERPL-MCNC: 7.8 G/DL (ref 6–8.4)
RBC # BLD AUTO: 3.86 M/UL (ref 4–5.4)
SODIUM SERPL-SCNC: 136 MMOL/L (ref 136–145)
WBC # BLD AUTO: 7.77 K/UL (ref 3.9–12.7)

## 2024-06-23 PROCEDURE — 27000207 HC ISOLATION

## 2024-06-23 PROCEDURE — 87449 NOS EACH ORGANISM AG IA: CPT | Performed by: STUDENT IN AN ORGANIZED HEALTH CARE EDUCATION/TRAINING PROGRAM

## 2024-06-23 PROCEDURE — 80053 COMPREHEN METABOLIC PANEL: CPT

## 2024-06-23 PROCEDURE — 25000003 PHARM REV CODE 250

## 2024-06-23 PROCEDURE — 25000003 PHARM REV CODE 250: Performed by: STUDENT IN AN ORGANIZED HEALTH CARE EDUCATION/TRAINING PROGRAM

## 2024-06-23 PROCEDURE — 84145 PROCALCITONIN (PCT): CPT

## 2024-06-23 PROCEDURE — 85025 COMPLETE CBC W/AUTO DIFF WBC: CPT

## 2024-06-23 PROCEDURE — 84100 ASSAY OF PHOSPHORUS: CPT

## 2024-06-23 PROCEDURE — 36415 COLL VENOUS BLD VENIPUNCTURE: CPT

## 2024-06-23 PROCEDURE — 20600001 HC STEP DOWN PRIVATE ROOM

## 2024-06-23 PROCEDURE — 63600175 PHARM REV CODE 636 W HCPCS

## 2024-06-23 PROCEDURE — 86140 C-REACTIVE PROTEIN: CPT | Performed by: STUDENT IN AN ORGANIZED HEALTH CARE EDUCATION/TRAINING PROGRAM

## 2024-06-23 PROCEDURE — 83735 ASSAY OF MAGNESIUM: CPT

## 2024-06-23 RX ADMIN — HEPARIN SODIUM 5000 UNITS: 5000 INJECTION INTRAVENOUS; SUBCUTANEOUS at 10:06

## 2024-06-23 RX ADMIN — METOPROLOL TARTRATE 25 MG: 25 TABLET, FILM COATED ORAL at 09:06

## 2024-06-23 RX ADMIN — ACETAMINOPHEN 650 MG: 650 SOLUTION ORAL at 03:06

## 2024-06-23 RX ADMIN — POLYETHYLENE GLYCOL 3350 17 G: 17 POWDER, FOR SOLUTION ORAL at 09:06

## 2024-06-23 RX ADMIN — ACETAMINOPHEN 650 MG: 650 SOLUTION ORAL at 09:06

## 2024-06-23 RX ADMIN — HEPARIN SODIUM 5000 UNITS: 5000 INJECTION INTRAVENOUS; SUBCUTANEOUS at 05:06

## 2024-06-23 RX ADMIN — ATORVASTATIN CALCIUM 40 MG: 40 TABLET, FILM COATED ORAL at 09:06

## 2024-06-23 RX ADMIN — METOPROLOL TARTRATE 25 MG: 25 TABLET, FILM COATED ORAL at 10:06

## 2024-06-23 RX ADMIN — POLYETHYLENE GLYCOL 3350 17 G: 17 POWDER, FOR SOLUTION ORAL at 03:06

## 2024-06-23 RX ADMIN — PANTOPRAZOLE SODIUM 40 MG: 40 GRANULE, DELAYED RELEASE ORAL at 09:06

## 2024-06-23 RX ADMIN — HEPARIN SODIUM 5000 UNITS: 5000 INJECTION INTRAVENOUS; SUBCUTANEOUS at 03:06

## 2024-06-23 RX ADMIN — ASPIRIN 81 MG CHEWABLE TABLET 81 MG: 81 TABLET CHEWABLE at 09:06

## 2024-06-23 RX ADMIN — Medication 500 MG: at 09:06

## 2024-06-23 RX ADMIN — POLYETHYLENE GLYCOL 3350 17 G: 17 POWDER, FOR SOLUTION ORAL at 10:06

## 2024-06-23 RX ADMIN — Medication 500 MG: at 10:06

## 2024-06-23 RX ADMIN — INSULIN GLARGINE 13 UNITS: 100 INJECTION, SOLUTION SUBCUTANEOUS at 10:06

## 2024-06-23 NOTE — ASSESSMENT & PLAN NOTE
Adjusting insulin to account for diabetic TF    Patient's FSGs are controlled on current medication regimen.  Last A1c reviewed-   Lab Results   Component Value Date    HGBA1C 6.2 (H) 05/27/2024     Most recent fingerstick glucose reviewed-   Recent Labs   Lab 06/22/24 2138   POCTGLUCOSE 125*       Current correctional scale  Medium  Maintain anti-hyperglycemic dose as follows-   Antihyperglycemics (From admission, onward)    Start     Stop Route Frequency Ordered    06/09/24 2100  insulin glargine U-100 (Lantus) pen 13 Units         -- SubQ Nightly 06/09/24 0756    06/09/24 1255  insulin aspart U-100 pen 0-10 Units         -- SubQ Before meals & nightly PRN 06/09/24 1155        Hold Oral hypoglycemics while patient is in the hospital.  POCT glucose checks   Continue tube feeds

## 2024-06-23 NOTE — PLAN OF CARE
PLAN OF CARE NOTE     Fall bundle in place. Pt. Remained free from falls/rauma/injuries. No S/S of CP/ SOB/discomfort. VSS. Afebrile. Unable to assess orientation due to being non-verbal, but does cooperate w/ care. Tele, NSR w/ int. Episodes if tachycardia (low 100s). RA. Pt. Does not display any S/S of pain this shift. Pts' feeding bag changed @ 2200, remains Diabetisource @ 15 mls/hr, goal of 50 mls/hr. No Bms. The POC reviewed w/ pt. & pts' partner @ bedside, questions were answered & encouraged. No further concerns at this time.

## 2024-06-23 NOTE — NURSING
Patient without IV access. Attempted to place PIV to right hand per protocol. Patient is a difficult stick and was unable to start PIV. Notified primary MD for permission to place order for midline.Contacted VINCE Gregory DNP for permission to consult midline/picc team for access.

## 2024-06-23 NOTE — ASSESSMENT & PLAN NOTE
Creatine stable for now. BMP reviewed- noted Estimated Creatinine Clearance: 12.9 mL/min (A) (based on SCr of 6.3 mg/dL (H)). according to latest data. Based on current GFR, CKD stage is end stage.  Monitor UOP and serial BMP and adjust therapy as needed. Renally dose meds. Avoid nephrotoxic medications and procedures.    -- HD MWF  -- nephro following  -- Hypophosphatemic on sevelamer  -- SW onboard for placement to [facility] to continue dialysis.

## 2024-06-23 NOTE — PROGRESS NOTES
VANCOMYCIN DOSING BY PHARMACY DISCONTINUATION NOTE    Sarah Saravia is a 53 y.o. female who had been consulted for vancomycin dosing.    The pharmacy consult for vancomycin dosing has been discontinued.     Vancomycin Dosing by Pharmacy Consult will sign-off. Please reconsult if necessary. Thank you for allowing us to participate in this patient's care.     Deysi Adame, [place], BCPS  [id number]

## 2024-06-23 NOTE — ASSESSMENT & PLAN NOTE
Outpatient MRI and NSGY f/up   Minocycline Counseling: Patient advised regarding possible photosensitivity and discoloration of the teeth, skin, lips, tongue and gums.  Patient instructed to avoid sunlight, if possible.  When exposed to sunlight, patients should wear protective clothing, sunglasses, and sunscreen.  The patient was instructed to call the office immediately if the following severe adverse effects occur:  hearing changes, easy bruising/bleeding, severe headache, or vision changes.  The patient verbalized understanding of the proper use and possible adverse effects of minocycline.  All of the patient's questions and concerns were addressed.

## 2024-06-23 NOTE — PROGRESS NOTES
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 History was given from patient's daughter. She was undergoing dialysis today and noticed she was lethargic, less alert than usual self. Pt completed dialysis and still not acting herself. EMS was called, fever of a 100.  On chart review, she did have an episode of large volume emesis around 1700.  Per EMS, she had a slightly elevated temp 100.0°F, glucose 300s.     In the ED: UA 2+ leuks, >100 WBC, many bacteria, WBC 17, CT abd/pelvis concerning for cystitis. Given vanc/zosyn    Overview/Hospital Course:  53 JARROD admitted to Hospital Medicine service for urosepsis. Vanc/zosyn initiated, found to have staph epi in all 4 bottles, vanc/ertapenem course completed per ID. Vascular Neurology consulted concerning mental status change with the recommendation to initiate ASA 81 QD and to obtain an MRI to r/o new stroke. Per discussion with vascular neurology, MRI findings appear to be expected changes from prior stroke. Nephrology was consulted for regularly scheduled dialysis. Pulm consulted, patient decannulated 4/30. Failed swallow assessment, continuing tube feeds. Ongoing placement difficulties d/t need for accepting HD facility to have sandee lift with 2 personnel to operate. Once patient gets accepted at [facility], next step will be to work with daughter on long-term NH placement. SW onboard working on paperwork for long term Medicaid application. H/c c/b new fever, ID reconsulted, CT chest showing bilateral ground glass opacities,  Vanc/meropenem course to be completed 6/17, however patient had further episode of fever and will need continued merem and ID consulted for assistance. CT pan scan to assess for infection. Imaging without signs of infection, patient to complete empiric abx for 5 days. Patient continues fevering despite broad abx, no identified source. Chest x-ray, peripheral smear to assess for other sources.    Interval History: NAEO, VSS, will trial discontinuing antibiotics today and trending fevers.     Review of Systems   Unable to perform ROS: Patient nonverbal     Objective:     Vital Signs (Most Recent):  Temp: 98 °F (36.7 °C) (06/23/24 0734)  Pulse: 95 (06/23/24 0734)  Resp: 19 (06/23/24 0734)  BP: (!) 105/59 (06/23/24 0734)  SpO2: 100 % (06/23/24 0734) Vital Signs (24h Range):  Temp:  [98 °F (36.7 °C)-100.5 °F (38.1 °C)] 98 °F (36.7 °C)  Pulse:  [] 95  Resp:  [18-19] 19  SpO2:  [99 %-100 %] 100 %  BP: (105-125)/(59-84) 105/59     Weight: 105.5 kg (232 lb 9.4 oz)  Body mass index is 36.43 kg/m².    Intake/Output Summary (Last 24 hours) at 6/23/2024 1046  Last data filed at 6/23/2024 0008  Gross per 24 hour   Intake 200 ml   Output 40 ml   Net 160 ml         Physical Exam  Nursing note reviewed.   Constitutional:       General: She is not in acute distress.     Appearance: Normal appearance. She is obese. She is not ill-appearing or toxic-appearing.   HENT:      Head: Normocephalic and atraumatic.      Right Ear: External ear normal.      Left Ear: External ear normal.   Eyes:      General: No scleral icterus.     Conjunctiva/sclera: Conjunctivae normal.   Cardiovascular:      Rate and Rhythm: Normal rate and regular rhythm.      Pulses: Normal pulses.      Heart sounds: Normal heart sounds.   Pulmonary:      Effort: Pulmonary effort is normal. No respiratory distress.      Breath sounds: Normal breath sounds.   Abdominal:      General: Abdomen is flat. There is no distension.      Palpations: Abdomen is soft.       Comments: G tube   Musculoskeletal:         General: Normal range of motion.   Skin:     General: Skin is warm and dry.      Comments: R chest HD line w/o evidence of infection    Neurological:      Mental Status: She is alert. Mental status is at baseline.      Comments: Awake and alert. Following few commands. Non verbal             Significant Labs: All pertinent labs within the past 24 hours have been reviewed.    Significant Imaging: I have reviewed all pertinent imaging results/findings within the past 24 hours.    Assessment/Plan:      * Sepsis  This patient does have evidence of infective focus. My overall impression is sepsis. Source: Skin and Soft Tissue (location Abdominal). Not requiring pressors. Source control achieved by: vanc/meropenem.  Concerns septic source may be PEG site with associated purulent wound despite Wound Care attempts to protect with barriers. PEG placed by General Surgery 2/2024. General Surgery consulted regarding PEG site ordered CT No evidence of infection  US extremities negative for thrombus   CT pan scan w/ IV contrast without evidence of infection    - discontinuing antibiotics and trending fevers  - Peripheral smear given cyclic nature of fevers/leukocytosis.  - ID reconsulted for further assistance    Cervical stenosis of spinal canal  Outpatient MRI and NSGY f/up    Vulvovaginal candidiasis  Noted 5/29, given 150mg fluconazole x1    Irritant contact dermatitis due friction or contact with other specified body fluids  Wound care following     Debility  Needs:  Wheelchair: patient has a mobility limitation that significantly impairs her ability to participate in one or more mobility related activities of daily living (MRADL's) such as toileting, feeding, dressing, grooming, and bathing in customary locations in the home.  The mobility limitation cannot be sufficiently resolved by the use of a cane or walker.   The use of a manual wheelchair will significantly improve the  patient's ability to participate in MRADLS and the patient will use it on regular basis in the home.  Family/pt has expressed her willingness to use a manual wheelchair in the home.  She also has a caregiver who is capable of assisting in mobility.    Mrs Saravia requires a hospital bed due to her requiring positioning of the body in ways not feasible with an ordinary bed to alleviate pain/ is completely immobile /or limited mobility and cannot independently make changes in body position without the use of the bed.  The positioning of the body cannot be sufficiently resolved by the use of pillows and wedges    Patient has a mobility limitation that significantly impairs their ability to participate in one or more mobility related activities of daily living, including toileting. This deficit can be resolved by using a bedside commode. Patient demonstrates mobility limitations that will cause them to be confined to one room at home without bathroom access for up to 30 days. Using a bedside commode will greatly improve the patient's ability to participate in MRADLs     Complication of vascular dialysis catheter  Cath intermittently clogging, not resolving with cath-hedy, going for IR venogram 4/30 with possible exchange. S/p exchange with IR on 4/30    Constipation  Stool burden on CT    -continue bowel reg  -monitor for BMs    Moderate malnutrition  Nutrition consulted. Most recent weight and BMI monitored-     Measurements:  Wt Readings from Last 1 Encounters:   06/19/24 105.5 kg (232 lb 9.4 oz)   Body mass index is 36.43 kg/m².    Patient has been screened and assessed by RD.    Malnutrition Type:  Context: acute illness or injury  Level: moderate    Malnutrition Characteristic Summary:  Weight Loss (Malnutrition): 10% in 6 months  Subcutaneous Fat (Malnutrition): mild depletion  Muscle Mass (Malnutrition): mild depletion  Fluid Accumulation (Malnutrition): mild    Interventions/Recommendations (treatment  "strategy):  - TF  - previous history of failed swallow studies    Prolonged QT interval  Limit Qtc prolonging drugs as able    Spastic hemiplegia of right dominant side as late effect of cerebrovascular disease  Important that patient is able to sit in chair for 4h for dialysis placement needs, even if she requires lift/assistance getting into the chair.This patient has Chronic right hemiplegia due to stroke. Physical therapy services has been scheduled. Continue all standard measures for pressure injury prevention and consult wound care for any wounds (chronic or acute).    ESRD (end stage renal disease) on dialysis  Creatine stable for now. BMP reviewed- noted Estimated Creatinine Clearance: 12.9 mL/min (A) (based on SCr of 6.3 mg/dL (H)). according to latest data. Based on current GFR, CKD stage is end stage.  Monitor UOP and serial BMP and adjust therapy as needed. Renally dose meds. Avoid nephrotoxic medications and procedures.    -- HD MWF  -- nephro following  -- Hypophosphatemic on sevelamer  -- SW onboard for placement to [facility] to continue dialysis.    PEG (percutaneous endoscopic gastrostomy) status  On PEG tube.Wound care with PEG dressing changes.   - CT abdomen with PEG in correct location  - Tube feeds as toelrated    Leukocytosis  See "Sepsis"    Type 2 diabetes mellitus with hyperglycemia, with long-term current use of insulin  Adjusting insulin to account for diabetic TF    Patient's FSGs are controlled on current medication regimen.  Last A1c reviewed-   Lab Results   Component Value Date    HGBA1C 6.2 (H) 05/27/2024     Most recent fingerstick glucose reviewed-   Recent Labs   Lab 06/22/24  2138   POCTGLUCOSE 125*       Current correctional scale  Medium  Maintain anti-hyperglycemic dose as follows-   Antihyperglycemics (From admission, onward)      Start     Stop Route Frequency Ordered    06/09/24 2100  insulin glargine U-100 (Lantus) pen 13 Units         -- SubQ Nightly 06/09/24 0756    " 06/09/24 1255  insulin aspart U-100 pen 0-10 Units         -- SubQ Before meals & nightly PRN 06/09/24 1155          Hold Oral hypoglycemics while patient is in the hospital.  POCT glucose checks   Continue tube feeds    Hyponatremia  Patient has hyponatremia which is uncontrolled,We will aim to correct the sodium by 4-6mEq in 24 hours. We will monitor sodium Daily. The hyponatremia is due to ESRD. Discussed with nephrology, dialysate bath adjusted to account for and correct hyponatremia.  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 Pt underwent extensive course, currently still healing from this, but no longer has wound vac. Picture in media tabs    - Wound care while inpatient  - NH with wound care orders      VTE Risk Mitigation (From admission, onward)           Ordered     heparin (porcine) injection 1,000 Units  As needed (PRN)         06/21/24 0828     heparin (porcine) injection 1,000 Units  As needed (PRN)         06/10/24 0035     heparin (porcine) injection 5,000 Units  Every 8 hours         05/29/24 0823     heparin (porcine) injection 3,000 Units  As needed (PRN)         04/17/24 0825                    Discharge Planning   ZEKE: 6/26/2024     Code Status: Full Code   Is the patient medically ready for discharge?: No    Reason for patient still in hospital (select all that apply): Patient trending condition  Discharge Plan A: New Nursing Home placement - [facility] care facility   Discharge Delays: (!) Payor Issues          Lucien Driver MD  Department of Hospital Medicine   [facility] - Telemetry Stepdown

## 2024-06-23 NOTE — SUBJECTIVE & OBJECTIVE
Interval History: FEROZ, VSS, will trial discontinuing antibiotics today and trending fevers.     Review of Systems   Unable to perform ROS: Patient nonverbal     Objective:     Vital Signs (Most Recent):  Temp: 98 °F (36.7 °C) (06/23/24 0734)  Pulse: 95 (06/23/24 0734)  Resp: 19 (06/23/24 0734)  BP: (!) 105/59 (06/23/24 0734)  SpO2: 100 % (06/23/24 0734) Vital Signs (24h Range):  Temp:  [98 °F (36.7 °C)-100.5 °F (38.1 °C)] 98 °F (36.7 °C)  Pulse:  [] 95  Resp:  [18-19] 19  SpO2:  [99 %-100 %] 100 %  BP: (105-125)/(59-84) 105/59     Weight: 105.5 kg (232 lb 9.4 oz)  Body mass index is 36.43 kg/m².    Intake/Output Summary (Last 24 hours) at 6/23/2024 1046  Last data filed at 6/23/2024 0008  Gross per 24 hour   Intake 200 ml   Output 40 ml   Net 160 ml         Physical Exam  Nursing note reviewed.   Constitutional:       General: She is not in acute distress.     Appearance: Normal appearance. She is obese. She is not ill-appearing or toxic-appearing.   HENT:      Head: Normocephalic and atraumatic.      Right Ear: External ear normal.      Left Ear: External ear normal.   Eyes:      General: No scleral icterus.     Conjunctiva/sclera: Conjunctivae normal.   Cardiovascular:      Rate and Rhythm: Normal rate and regular rhythm.      Pulses: Normal pulses.      Heart sounds: Normal heart sounds.   Pulmonary:      Effort: Pulmonary effort is normal. No respiratory distress.      Breath sounds: Normal breath sounds.   Abdominal:      General: Abdomen is flat. There is no distension.      Palpations: Abdomen is soft.      Comments: G tube   Musculoskeletal:         General: Normal range of motion.   Skin:     General: Skin is warm and dry.      Comments: R chest HD line w/o evidence of infection    Neurological:      Mental Status: She is alert. Mental status is at baseline.      Comments: Awake and alert. Following few commands. Non verbal             Significant Labs: All pertinent labs within the past 24 hours  have been reviewed.    Significant Imaging: I have reviewed all pertinent imaging results/findings within the past 24 hours.

## 2024-06-23 NOTE — ASSESSMENT & PLAN NOTE
Patient has hyponatremia which is uncontrolled,We will aim to correct the sodium by 4-6mEq in 24 hours. We will monitor sodium Daily. The hyponatremia is due to ESRD. Discussed with nephrology, dialysate bath adjusted to account for and correct hyponatremia.  Recent Labs   Lab 06/23/24  0438

## 2024-06-23 NOTE — PROGRESS NOTES
Nurses Note -- 4 Eyes      6/22/2024  2200      Skin assessed during: Daily Assessment      [] No Altered Skin Integrity Present    []Prevention Measures Documented      [x] Yes- Altered Skin Integrity Present or Discovered   [] LDA Added if Not in Epic (Describe Wound)   [] New Altered Skin Integrity was Present on Admit and Documented in LDA   [] Wound Image Taken    Wound Care Consulted? Yes    Attending Nurse:  Cindy Saleh RN    Second RN/Staff Member:  Charisma Wayne RN

## 2024-06-23 NOTE — ASSESSMENT & PLAN NOTE
This patient does have evidence of infective focus. My overall impression is sepsis. Source: Skin and Soft Tissue (location Abdominal). Not requiring pressors. Source control achieved by: vanc/meropenem.  Concerns septic source may be PEG site with associated purulent wound despite Wound Care attempts to protect with barriers. PEG placed by General Surgery 2/2024. General Surgery consulted regarding PEG site ordered CT No evidence of infection  US extremities negative for thrombus   CT pan scan w/ IV contrast without evidence of infection    - discontinuing antibiotics and trending fevers  - Peripheral smear given cyclic nature of fevers/leukocytosis.  - ID reconsulted for further assistance

## 2024-06-24 PROBLEM — B37.31 VULVOVAGINAL CANDIDIASIS: Status: RESOLVED | Noted: 2024-05-29 | Resolved: 2024-06-24

## 2024-06-24 LAB
ALBUMIN SERPL BCP-MCNC: 2.9 G/DL (ref 3.5–5.2)
ALP SERPL-CCNC: 73 U/L (ref 55–135)
ALT SERPL W/O P-5'-P-CCNC: 6 U/L (ref 10–44)
ANION GAP SERPL CALC-SCNC: 17 MMOL/L (ref 8–16)
AST SERPL-CCNC: 15 U/L (ref 10–40)
BASOPHILS # BLD AUTO: 0.09 K/UL (ref 0–0.2)
BASOPHILS NFR BLD: 1.2 % (ref 0–1.9)
BILIRUB SERPL-MCNC: 0.3 MG/DL (ref 0.1–1)
BUN SERPL-MCNC: 33 MG/DL (ref 6–20)
CALCIUM SERPL-MCNC: 10.6 MG/DL (ref 8.7–10.5)
CHLORIDE SERPL-SCNC: 99 MMOL/L (ref 95–110)
CK SERPL-CCNC: 8 U/L (ref 20–180)
CO2 SERPL-SCNC: 19 MMOL/L (ref 23–29)
CREAT SERPL-MCNC: 7.8 MG/DL (ref 0.5–1.4)
DIFFERENTIAL METHOD BLD: ABNORMAL
EOSINOPHIL # BLD AUTO: 0.5 K/UL (ref 0–0.5)
EOSINOPHIL NFR BLD: 6.6 % (ref 0–8)
ERYTHROCYTE [DISTWIDTH] IN BLOOD BY AUTOMATED COUNT: 16.6 % (ref 11.5–14.5)
EST. GFR  (NO RACE VARIABLE): 5.7 ML/MIN/1.73 M^2
GLUCOSE SERPL-MCNC: 108 MG/DL (ref 70–110)
HCT VFR BLD AUTO: 31.4 % (ref 37–48.5)
HGB BLD-MCNC: 9 G/DL (ref 12–16)
IMM GRANULOCYTES # BLD AUTO: 0.05 K/UL (ref 0–0.04)
IMM GRANULOCYTES NFR BLD AUTO: 0.7 % (ref 0–0.5)
LYMPHOCYTES # BLD AUTO: 2.1 K/UL (ref 1–4.8)
LYMPHOCYTES NFR BLD: 28.2 % (ref 18–48)
MAGNESIUM SERPL-MCNC: 2.4 MG/DL (ref 1.6–2.6)
MCH RBC QN AUTO: 23.9 PG (ref 27–31)
MCHC RBC AUTO-ENTMCNC: 28.7 G/DL (ref 32–36)
MCV RBC AUTO: 83 FL (ref 82–98)
MONOCYTES # BLD AUTO: 0.9 K/UL (ref 0.3–1)
MONOCYTES NFR BLD: 12.4 % (ref 4–15)
NEUTROPHILS # BLD AUTO: 3.9 K/UL (ref 1.8–7.7)
NEUTROPHILS NFR BLD: 50.9 % (ref 38–73)
NRBC BLD-RTO: 0 /100 WBC
PATH REV BLD -IMP: NORMAL
PHOSPHATE SERPL-MCNC: 4.7 MG/DL (ref 2.7–4.5)
PLATELET # BLD AUTO: 422 K/UL (ref 150–450)
PMV BLD AUTO: 9.3 FL (ref 9.2–12.9)
POCT GLUCOSE: 128 MG/DL (ref 70–110)
POTASSIUM SERPL-SCNC: 4.1 MMOL/L (ref 3.5–5.1)
PROT SERPL-MCNC: 7.6 G/DL (ref 6–8.4)
RBC # BLD AUTO: 3.77 M/UL (ref 4–5.4)
SODIUM SERPL-SCNC: 135 MMOL/L (ref 136–145)
WBC # BLD AUTO: 7.56 K/UL (ref 3.9–12.7)

## 2024-06-24 PROCEDURE — 94761 N-INVAS EAR/PLS OXIMETRY MLT: CPT

## 2024-06-24 PROCEDURE — 20600001 HC STEP DOWN PRIVATE ROOM

## 2024-06-24 PROCEDURE — 82550 ASSAY OF CK (CPK): CPT | Performed by: STUDENT IN AN ORGANIZED HEALTH CARE EDUCATION/TRAINING PROGRAM

## 2024-06-24 PROCEDURE — 25000003 PHARM REV CODE 250

## 2024-06-24 PROCEDURE — 25000003 PHARM REV CODE 250: Performed by: NURSE PRACTITIONER

## 2024-06-24 PROCEDURE — 25000003 PHARM REV CODE 250: Performed by: STUDENT IN AN ORGANIZED HEALTH CARE EDUCATION/TRAINING PROGRAM

## 2024-06-24 PROCEDURE — 63600175 PHARM REV CODE 636 W HCPCS: Mod: JZ,JG | Performed by: INTERNAL MEDICINE

## 2024-06-24 PROCEDURE — 63600175 PHARM REV CODE 636 W HCPCS: Performed by: NURSE PRACTITIONER

## 2024-06-24 PROCEDURE — 90935 HEMODIALYSIS ONE EVALUATION: CPT | Mod: ,,, | Performed by: NURSE PRACTITIONER

## 2024-06-24 PROCEDURE — 80053 COMPREHEN METABOLIC PANEL: CPT

## 2024-06-24 PROCEDURE — 36415 COLL VENOUS BLD VENIPUNCTURE: CPT

## 2024-06-24 PROCEDURE — 80100014 HC HEMODIALYSIS 1:1

## 2024-06-24 PROCEDURE — 84100 ASSAY OF PHOSPHORUS: CPT

## 2024-06-24 PROCEDURE — 63600175 PHARM REV CODE 636 W HCPCS

## 2024-06-24 PROCEDURE — 27000207 HC ISOLATION

## 2024-06-24 PROCEDURE — 83735 ASSAY OF MAGNESIUM: CPT

## 2024-06-24 PROCEDURE — 85025 COMPLETE CBC W/AUTO DIFF WBC: CPT

## 2024-06-24 RX ADMIN — ALTEPLASE 4 MG: 2.2 INJECTION, POWDER, LYOPHILIZED, FOR SOLUTION INTRAVENOUS at 02:06

## 2024-06-24 RX ADMIN — ALTEPLASE 2 MG: 2.2 INJECTION, POWDER, LYOPHILIZED, FOR SOLUTION INTRAVENOUS at 03:06

## 2024-06-24 RX ADMIN — ATORVASTATIN CALCIUM 40 MG: 40 TABLET, FILM COATED ORAL at 09:06

## 2024-06-24 RX ADMIN — ERYTHROPOIETIN 6100 UNITS: 10000 INJECTION, SOLUTION INTRAVENOUS; SUBCUTANEOUS at 03:06

## 2024-06-24 RX ADMIN — PANTOPRAZOLE SODIUM 40 MG: 40 GRANULE, DELAYED RELEASE ORAL at 09:06

## 2024-06-24 RX ADMIN — ASPIRIN 81 MG CHEWABLE TABLET 81 MG: 81 TABLET CHEWABLE at 09:06

## 2024-06-24 RX ADMIN — ACETAMINOPHEN 650 MG: 650 SOLUTION ORAL at 09:06

## 2024-06-24 RX ADMIN — SODIUM CHLORIDE: 9 INJECTION, SOLUTION INTRAVENOUS at 10:06

## 2024-06-24 RX ADMIN — METOPROLOL TARTRATE 25 MG: 25 TABLET, FILM COATED ORAL at 10:06

## 2024-06-24 RX ADMIN — ACETAMINOPHEN 650 MG: 650 SOLUTION ORAL at 04:06

## 2024-06-24 RX ADMIN — POLYETHYLENE GLYCOL 3350 17 G: 17 POWDER, FOR SOLUTION ORAL at 04:06

## 2024-06-24 RX ADMIN — INSULIN GLARGINE 13 UNITS: 100 INJECTION, SOLUTION SUBCUTANEOUS at 10:06

## 2024-06-24 RX ADMIN — HEPARIN SODIUM 5000 UNITS: 5000 INJECTION INTRAVENOUS; SUBCUTANEOUS at 10:06

## 2024-06-24 RX ADMIN — HEPARIN SODIUM 3000 UNITS: 1000 INJECTION, SOLUTION INTRAVENOUS; SUBCUTANEOUS at 09:06

## 2024-06-24 RX ADMIN — Medication 500 MG: at 09:06

## 2024-06-24 RX ADMIN — POLYETHYLENE GLYCOL 3350 17 G: 17 POWDER, FOR SOLUTION ORAL at 09:06

## 2024-06-24 RX ADMIN — HEPARIN SODIUM 5000 UNITS: 5000 INJECTION INTRAVENOUS; SUBCUTANEOUS at 05:06

## 2024-06-24 RX ADMIN — Medication 500 MG: at 10:06

## 2024-06-24 NOTE — PT/OT/SLP PROGRESS
Speech Language Pathology      Sarah Saravia  MRN: [id number]    Patient not seen today secondary to patient URIEL for dialysis. Will re-attempt next service day.     LORIE Deluca-SLP

## 2024-06-24 NOTE — PROGRESS NOTES
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 History was given from patient's daughter. She was undergoing dialysis today and noticed she was lethargic, less alert than usual self. Pt completed dialysis and still not acting herself. EMS was called, fever of a 100.  On chart review, she did have an episode of large volume emesis around 1700.  Per EMS, she had a slightly elevated temp 100.0°F, glucose 300s.     In the ED: UA 2+ leuks, >100 WBC, many bacteria, WBC 17, CT abd/pelvis concerning for cystitis. Given vanc/zosyn    Overview/Hospital Course:  53 JARROD admitted to Hospital Medicine service for urosepsis. Vanc/zosyn initiated, found to have staph epi in all 4 bottles, vanc/ertapenem course completed per ID. Vascular Neurology consulted concerning mental status change with the recommendation to initiate ASA 81 QD and to obtain an MRI to r/o new stroke. Per discussion with vascular neurology, MRI findings appear to be expected changes from prior stroke. Nephrology was consulted for regularly scheduled dialysis. Pulm consulted, patient decannulated 4/30. Failed swallow assessment, continuing tube feeds. Ongoing placement difficulties d/t need for accepting HD facility to have sandee lift with 2 personnel to operate. Once patient gets accepted at [facility], next step will be to work with daughter on [facility] NH placement. SW onboard working on paperwork for long term Medicaid application. H/c c/b new fever, ID reconsulted, CT chest showing bilateral ground glass opacities,  Vanc/meropenem course to be completed 6/17, however patient had further episode of fever and will need continued merem and ID consulted for assistance. CT pan scan to assess for infection. Imaging without signs of infection, patient to complete empiric abx for 5 days. Patient continues fevering despite broad abx, no identified source. Chest x-ray, peripheral smear to assess for other sources.    Interval History: NAEON. No fevers overnight after stopping abx. Interventional nephrology consulted, low suspicion for clot in line. Ordering blood cx from line to assess for infection causing persistent fevers.     Review of Systems   Unable to perform ROS: Patient nonverbal     Objective:     Vital Signs (Most Recent):  Temp: 98.6 °F (37 °C) (06/24/24 0730)  Pulse: 90 (06/24/24 1116)  Resp: 18 (06/24/24 0730)  BP: 124/82 (06/24/24 1130)  SpO2: 100 % (06/24/24 1130) Vital Signs (24h Range):  Temp:  [97.9 °F (36.6 °C)-98.6 °F (37 °C)] 98.6 °F (37 °C)  Pulse:  [83-99] 90  Resp:  [17-18] 18  SpO2:  [94 %-100 %] 100 %  BP: (102-141)/(70-82) 124/82     Weight: 105.5 kg (232 lb 9.4 oz)  Body mass index is 36.43 kg/m².    Intake/Output Summary (Last 24 hours) at 6/24/2024 1135  Last data filed at 6/24/2024 0542  Gross per 24 hour   Intake 1168 ml   Output --   Net 1168 ml         Physical Exam  Constitutional:       Appearance: Normal appearance.   HENT:      Head: Normocephalic and atraumatic.      Right Ear: External ear normal.      Left Ear: External ear normal.   Cardiovascular:      Rate and Rhythm: Normal rate and regular rhythm.      Pulses: Normal pulses.      Heart sounds: Normal heart sounds.   Pulmonary:      Effort: Pulmonary effort is normal.      Breath sounds: Normal breath sounds.   Abdominal:      General: Abdomen is flat.      Palpations: Abdomen is soft.   Musculoskeletal:         General: Normal range of motion.      Cervical back: Normal range of motion.   Skin:     General: Skin is warm.      Capillary  Refill: Capillary refill takes less than 2 seconds.   Neurological:      General: No focal deficit present.      Mental Status: She is alert and oriented to person, place, and time.             Significant Labs: All pertinent labs within the past 24 hours have been reviewed.  CBC:   Recent Labs   Lab 06/23/24  0438 06/24/24  0758   WBC 7.77 7.56   HGB 9.4* 9.0*   HCT 35.0* 31.4*    422     CMP:   Recent Labs   Lab 06/23/24  0438 06/24/24  0758    135*   K 4.2 4.1    99   CO2 15* 19*    108   BUN 23* 33*   CREATININE 6.3* 7.8*   CALCIUM 10.3 10.6*   PROT 7.8 7.6   ALBUMIN 2.9* 2.9*   BILITOT 0.3 0.3   ALKPHOS 71 73   AST 17 15   ALT 10 6*   ANIONGAP 17* 17*       Significant Imaging: I have reviewed all pertinent imaging results/findings within the past 24 hours.    Assessment/Plan:      * Sepsis  This patient does have evidence of infective focus. My overall impression is sepsis. Source: Skin and Soft Tissue (location Abdominal). Not requiring pressors. Source control achieved by: vanc/meropenem.  Concerns septic source may be PEG site with associated purulent wound despite Wound Care attempts to protect with barriers. PEG placed by General Surgery 2/2024. General Surgery consulted regarding PEG site ordered CT No evidence of infection  US extremities negative for thrombus   CT pan scan w/ IV contrast without evidence of infection  Interventional nephrology consulted for line eval - low suspicion for clot, getting blood cx to r/o infected line    - No fevers since abx dcd  - Peripheral smear given cyclic nature of fevers/leukocytosis.  - ID reconsulted for further assistance    Cervical stenosis of spinal canal  Outpatient MRI and NSGY f/up    Irritant contact dermatitis due friction or contact with other specified body fluids  Wound care following     Debility  Needs:  Wheelchair: patient has a mobility limitation that significantly impairs her ability to participate in one or more  mobility related activities of daily living (MRADL's) such as toileting, feeding, dressing, grooming, and bathing in customary locations in the home.  The mobility limitation cannot be sufficiently resolved by the use of a cane or walker.   The use of a manual wheelchair will significantly improve the patient's ability to participate in MRADLS and the patient will use it on regular basis in the home.  Family/pt has expressed her willingness to use a manual wheelchair in the home.  She also has a caregiver who is capable of assisting in mobility.    Mrs Saravia requires a hospital bed due to her requiring positioning of the body in ways not feasible with an ordinary bed to alleviate pain/ is completely immobile /or limited mobility and cannot independently make changes in body position without the use of the bed.  The positioning of the body cannot be sufficiently resolved by the use of pillows and wedges    Patient has a mobility limitation that significantly impairs their ability to participate in one or more mobility related activities of daily living, including toileting. This deficit can be resolved by using a bedside commode. Patient demonstrates mobility limitations that will cause them to be confined to one room at home without bathroom access for up to 30 days. Using a bedside commode will greatly improve the patient's ability to participate in MRADLs     Complication of vascular dialysis catheter  Cath intermittently clogging, not resolving with cath-hedy, going for IR venogram 4/30 with possible exchange. S/p exchange with IR on 4/30    Constipation  Stool burden on CT    -continue bowel reg  -monitor for BMs    Moderate malnutrition  Nutrition consulted. Most recent weight and BMI monitored-     Measurements:  Wt Readings from Last 1 Encounters:   06/19/24 105.5 kg (232 lb 9.4 oz)   Body mass index is 36.43 kg/m².    Patient has been screened and assessed by RD.    Malnutrition Type:  Context: acute illness  "or injury  Level: moderate    Malnutrition Characteristic Summary:  Weight Loss (Malnutrition): 10% in 6 months  Subcutaneous Fat (Malnutrition): mild depletion  Muscle Mass (Malnutrition): mild depletion  Fluid Accumulation (Malnutrition): mild    Interventions/Recommendations (treatment strategy):  - TF  - previous history of failed swallow studies    Prolonged QT interval  Limit Qtc prolonging drugs as able    Spastic hemiplegia of right dominant side as late effect of cerebrovascular disease  Important that patient is able to sit in chair for 4h for dialysis placement needs, even if she requires lift/assistance getting into the chair.This patient has Chronic right hemiplegia due to stroke. Physical therapy services has been scheduled. Continue all standard measures for pressure injury prevention and consult wound care for any wounds (chronic or acute).    ESRD (end stage renal disease) on dialysis  Creatine stable for now. BMP reviewed- noted Estimated Creatinine Clearance: 12.9 mL/min (A) (based on SCr of 6.3 mg/dL (H)). according to latest data. Based on current GFR, CKD stage is end stage.  Monitor UOP and serial BMP and adjust therapy as needed. Renally dose meds. Avoid nephrotoxic medications and procedures.    -- HD MWF  -- nephro following  -- Hypophosphatemic on sevelamer  -- SW onboard for placement to [facility] to continue dialysis.    PEG (percutaneous endoscopic gastrostomy) status  On PEG tube.Wound care with PEG dressing changes.   - CT abdomen with PEG in correct location  - Tube feeds as toelrated    Leukocytosis  See "Sepsis"    Type 2 diabetes mellitus with hyperglycemia, with long-term current use of insulin  Adjusting insulin to account for diabetic TF    Patient's FSGs are controlled on current medication regimen.  Last A1c reviewed-   Lab Results   Component Value Date    HGBA1C 6.2 (H) 05/27/2024     Most recent fingerstick glucose reviewed-   Recent Labs   Lab 06/23/24  6082 "   POCTGLUCOSE 107       Current correctional scale  Medium  Maintain anti-hyperglycemic dose as follows-   Antihyperglycemics (From admission, onward)      Start     Stop Route Frequency Ordered    06/09/24 2100  insulin glargine U-100 (Lantus) pen 13 Units         -- SubQ Nightly 06/09/24 0756    06/09/24 1255  insulin aspart U-100 pen 0-10 Units         -- SubQ Before meals & nightly PRN 06/09/24 1155          Hold Oral hypoglycemics while patient is in the hospital.  POCT glucose checks   Continue tube feeds    Hyponatremia  Patient has hyponatremia which is uncontrolled,We will aim to correct the sodium by 4-6mEq in 24 hours. We will monitor sodium Daily. The hyponatremia is due to ESRD. Discussed with nephrology, dialysate bath adjusted to account for and correct hyponatremia.  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 Pt underwent extensive course, currently still healing from this, but no longer has wound vac. Picture in media tabs    - Wound care while inpatient  - NH with wound care orders      VTE Risk Mitigation (From admission, onward)           Ordered     heparin (porcine) injection 1,000 Units  As needed (PRN)         06/21/24 0828     heparin (porcine) injection 1,000 Units  As needed (PRN)         06/10/24 0035     heparin (porcine) injection 5,000 Units  Every 8 hours         05/29/24 0823     heparin (porcine) injection 3,000 Units  As needed (PRN)         04/17/24 0825                    Discharge Planning   ZEKE: 6/25/2024     Code Status: Full Code   Is the patient medically ready for discharge?: No    Reason for patient still in hospital (select all that apply): Patient trending condition  Discharge Plan A: New Nursing Home placement - longterm care facility   Discharge Delays: (!) Payor Issues              Jatin Hutchins MD  Department of Hospital Medicine   [facility] - Telemetry Stepdown

## 2024-06-24 NOTE — NURSING
1 set of blood cultures drawn from red lumen of [facility].    1 set of blood cultures drawn from blue lumen of [facility].    Both sets were drawn prior to start of HD per Dr. Martinez's order.      Additionally, 2 sets of peripheral blood cultures were ordered to be drawn by lab.        UPDATE:     Rec'd order from Dr. Martinez to cancel blood cultures.  Nasrin in microbiology notified at 1620.

## 2024-06-24 NOTE — ASSESSMENT & PLAN NOTE
Patient has hyponatremia which is uncontrolled,We will aim to correct the sodium by 4-6mEq in 24 hours. We will monitor sodium Daily. The hyponatremia is due to ESRD. Discussed with nephrology, dialysate bath adjusted to account for and correct hyponatremia.  Recent Labs   Lab 06/24/24  0758   *

## 2024-06-24 NOTE — SUBJECTIVE & OBJECTIVE
Interval History: NAEON. No fevers overnight after stopping abx. Interventional nephrology consulted, low suspicion for clot in line. Ordering blood cx from line to assess for infection causing persistent fevers.     Review of Systems   Unable to perform ROS: Patient nonverbal     Objective:     Vital Signs (Most Recent):  Temp: 98.6 °F (37 °C) (06/24/24 0730)  Pulse: 90 (06/24/24 1116)  Resp: 18 (06/24/24 0730)  BP: 124/82 (06/24/24 1130)  SpO2: 100 % (06/24/24 1130) Vital Signs (24h Range):  Temp:  [97.9 °F (36.6 °C)-98.6 °F (37 °C)] 98.6 °F (37 °C)  Pulse:  [83-99] 90  Resp:  [17-18] 18  SpO2:  [94 %-100 %] 100 %  BP: (102-141)/(70-82) 124/82     Weight: 105.5 kg (232 lb 9.4 oz)  Body mass index is 36.43 kg/m².    Intake/Output Summary (Last 24 hours) at 6/24/2024 1135  Last data filed at 6/24/2024 0542  Gross per 24 hour   Intake 1168 ml   Output --   Net 1168 ml         Physical Exam  Constitutional:       Appearance: Normal appearance.   HENT:      Head: Normocephalic and atraumatic.      Right Ear: External ear normal.      Left Ear: External ear normal.   Cardiovascular:      Rate and Rhythm: Normal rate and regular rhythm.      Pulses: Normal pulses.      Heart sounds: Normal heart sounds.   Pulmonary:      Effort: Pulmonary effort is normal.      Breath sounds: Normal breath sounds.   Abdominal:      General: Abdomen is flat.      Palpations: Abdomen is soft.   Musculoskeletal:         General: Normal range of motion.      Cervical back: Normal range of motion.   Skin:     General: Skin is warm.      Capillary Refill: Capillary refill takes less than 2 seconds.   Neurological:      General: No focal deficit present.      Mental Status: She is alert and oriented to person, place, and time.             Significant Labs: All pertinent labs within the past 24 hours have been reviewed.  CBC:   Recent Labs   Lab 06/23/24  0438 06/24/24  0758   WBC 7.77 7.56   HGB 9.4* 9.0*   HCT 35.0* 31.4*    422      CMP:   Recent Labs   Lab 06/23/24  0438 06/24/24  0758    135*   K 4.2 4.1    99   CO2 15* 19*    108   BUN 23* 33*   CREATININE 6.3* 7.8*   CALCIUM 10.3 10.6*   PROT 7.8 7.6   ALBUMIN 2.9* 2.9*   BILITOT 0.3 0.3   ALKPHOS 71 73   AST 17 15   ALT 10 6*   ANIONGAP 17* 17*       Significant Imaging: I have reviewed all pertinent imaging results/findings within the past 24 hours.

## 2024-06-24 NOTE — CONSULTS
[facility] - Telemetry Stepdown  Nephrology  Consult Note    Patient Name: Sarah Saravia  MRN: [id number]  Admission Date: 4/10/2024  Hospital Length of Stay: 75 days  Attending Provider: Soco Erickson MD   Primary Care Physician: Deanna, Primary Doctor  Principal Problem:Sepsis    IP consult to Interventional Nephrology  Consult performed by: Mathew Steinberg MD  Consult ordered by: Jatin Hutchins MD  Reason for consult: Eval for potential tunneled dialysis catheter infection.        Subjective:     HPI: The patient is a 53 y.o. 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 The patient is unable to provide adequate history. Additional history and patient information was obtained from patient's daughter, past medical records and ER records. Per chart, she was undergoing dialysis Wednesday and was noted to be more lethargic than usual despite completing her HD session. EMS was called, fever of a 100. In the ED, UA 2+ leuks, >100 WBC, many bacteria, WBC 17, CT abd/pelvis concerning for cystitis. Given vanc/zosyn and admitted.     Of note, the patient was initiated on RRT in February 2024 after being admitted with ALVARADO 2/2 iATN due to septic shock. Perm cath placed on 2/29/24. She was transferred to [facility] on 3/12. Interventional Nephrology was consulted for concerns of potential tunneled dialysis line infection. She developed fever on 6/22 with a T-max 38.1C and leukocytosis sporadically throughout her hospital stay with the most recent episode of leukocytosis being on 6/22 where it reached a peak of 18.94. She received meropenum through 6/22 and last dose of vancomycin was on 6/21. Cultures thus far are positive for Proteus Mirabilis ESBL from urine cultures on 4/11, Staph Epidermis in 2/2 vials on 4/10, pseudomonas in groin abscess on 3/8. All cultures  taken this hospital stay have been negative.       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    Hypermagnesemia 04/11/2024    POA, Mg 3.2  Daily chem       Morbid obesity     Necrotizing fasciitis        Past Surgical History:   Procedure Laterality Date    CLOSURE OF WOUND Right 2/22/2024    Procedure: CLOSURE, WOUND;  Surgeon: Steve Cole MD;  [facility];  Service: General;  Laterality: Right;    ESOPHAGOGASTRODUODENOSCOPY W/ PEG N/A 2/22/2024    Procedure: EGD, WITH PEG TUBE INSERTION;  Surgeon: Steve Cole MD;  Location: [facility] OR 2ND FLR;  Service: General;  Laterality: N/A;    INCISION AND DRAINAGE OF PERIRECTAL REGION N/A 1/29/2024    Procedure: INCISION AND DRAINAGE, PERIRECTAL REGION;  Surgeon: Axel Ramsay MD;  Location: [facility] OR;  Service: General;  Laterality: N/A;    LUMBAR PUNCTURE N/A 2/16/2024    Procedure: Lumbar Puncture;  Surgeon: Macy Boykin;  Location: [facility];  Service: Anesthesiology;  Laterality: N/A;    PLACEMENT, TRIALYSIS CATH Right 1/31/2024    Procedure: INSERTION, CATHETER, TRIPLE LUMEN, HEMODIALYSIS, TEMPORARY;  Surgeon: Alvin Junior MD;  Location: [facility] OR;  Service: General;  Laterality: Right;    REPLACEMENT OF WOUND VACUUM-ASSISTED CLOSURE DEVICE Right 2/12/2024    Procedure: REPLACEMENT, WOUND VAC;  Surgeon: Mundo Carmona MD;  [facility];  Service: General;  Laterality: Right;    REPLACEMENT OF WOUND VACUUM-ASSISTED CLOSURE DEVICE N/A 2/15/2024    Procedure: REPLACEMENT, WOUND VAC;  Surgeon: Steve Cole MD;  Location: [facility] OR 2ND FLR;  Service: General;  Laterality: N/A;    REPLACEMENT OF WOUND VACUUM-ASSISTED CLOSURE DEVICE Right 2/19/2024    Procedure: REPLACEMENT, WOUND VAC;  Surgeon: Steve Cole MD;  Location: [facility] OR 2ND FLR;  Service: General;  Laterality: Right;  RLE/groin    REPLACEMENT OF WOUND VACUUM-ASSISTED CLOSURE DEVICE Right 2/22/2024    Procedure:  REPLACEMENT, WOUND VAC;  Surgeon: Steve Cole MD;  [facility];  Service: General;  Laterality: Right;    TRACHEOSTOMY N/A 2/22/2024    Procedure: CREATION, TRACHEOSTOMY;  Surgeon: Steve Cole MD;  [facility];  Service: General;  Laterality: N/A;    WOUND DEBRIDEMENT Bilateral 2/2/2024    Procedure: DEBRIDEMENT, WOUND;  Surgeon: Steve Cole MD;  [facility];  Service: General;  Laterality: Bilateral;  Bilateral groin  Possible wound vac placement    WOUND DEBRIDEMENT Right 2/6/2024    Procedure: DEBRIDEMENT, WOUND, replace wound vac, possible closure;  Surgeon: Steve Cole MD;  [facility];  Service: General;  Laterality: Right;  RLE    WOUND DEBRIDEMENT Right 2/9/2024    Procedure: DEBRIDEMENT, WOUND w wound vac change;  Surgeon: Steve Cole MD;  Location: [facility] OR [address] [place] [address];  Service: General;  Laterality: Right;  RLE    WOUND DEBRIDEMENT Right 2/12/2024    Procedure: R thigh wound debridement;  Surgeon: Mundo Carmona MD;  Location: [address];  Service: General;  Laterality: Right;    WOUND EXPLORATION Right 1/31/2024    Procedure: IRRIGATION & DEBRIDEMENT, WOUND DEBRIDEMENT;  Surgeon: Alvin Junior MD;  Location: [facility];  Service: General;  Laterality: Right;       Review of patient's allergies indicates:  No Known Allergies  Current Facility-Administered Medications   Medication Frequency    0.9%  NaCl infusion Once    acetaminophen oral solution 650 mg Q6H PRN    ascorbic acid (vitamin C) tablet 500 mg BID    aspirin chewable tablet 81 mg Daily    atorvastatin tablet 40 mg Daily    dextrose 10% bolus 125 mL 125 mL PRN    dextrose 10% bolus 250 mL 250 mL PRN    epoetin merry injection 6,100 Units Every Mon, Wed, Fri    glucagon (human recombinant) injection 1 mg PRN    glucose chewable tablet 16 g PRN    glucose chewable tablet 24 g PRN    heparin (porcine) injection 1,000 Units PRN    heparin (porcine) injection 3,000 Units PRN     heparin (porcine) injection 5,000 Units Q8H    hepatitis B virus vacc.rec(PF) injection 20 mcg vaccine x 1 dose    insulin aspart U-100 pen 0-10 Units QID (AC + HS) PRN    insulin glargine U-100 (Lantus) pen 13 Units QHS    metoprolol tartrate (LOPRESSOR) tablet 25 mg BID    ondansetron 4 mg/5 mL solution 4 mg Q6H PRN    oxyCODONE 5 mg/5 mL solution 5 mg Q4H PRN    pantoprazole suspension 40 mg Daily    polyethylene glycol packet 17 g TID    sodium chloride 0.9% bolus 250 mL 250 mL PRN    sodium chloride 0.9% flush 10 mL Q12H PRN     Family History    None       Tobacco Use    Smoking status: Unknown    Smokeless tobacco: Not on file   Substance and Sexual Activity    Alcohol use: Not on file    Drug use: Not on file    Sexual activity: Not on file     Review of Systems   Constitutional:  Positive for fever.   Respiratory:  Negative for chest tightness and shortness of breath.    Cardiovascular:  Negative for chest pain.   Gastrointestinal:  Negative for abdominal distention.   Neurological:  Negative for dizziness, light-headedness and numbness.     Objective:     Vital Signs (Most Recent):  Temp: 98.6 °F (37 °C) (06/24/24 0730)  Pulse: 90 (06/24/24 1116)  Resp: 18 (06/24/24 0730)  BP: 125/82 (06/24/24 1115)  SpO2: 100 % (06/24/24 1115) Vital Signs (24h Range):  Temp:  [97.9 °F (36.6 °C)-98.6 °F (37 °C)] 98.6 °F (37 °C)  Pulse:  [83-99] 90  Resp:  [17-18] 18  SpO2:  [94 %-100 %] 100 %  BP: (102-141)/(70-82) 125/82     Weight: 105.5 kg (232 lb 9.4 oz) (06/19/24 1052)  Body mass index is 36.43 kg/m².  Body surface area is 2.23 meters squared.    I/O last 3 completed shifts:  In: 1468 [NG/GT:1468]  Out: 0      Physical Exam  Constitutional:       Appearance: Normal appearance. She is obese.   HENT:      Head: Normocephalic and atraumatic.      Right Ear: External ear normal.      Left Ear: External ear normal.      Nose: Nose normal.      Mouth/Throat:      Mouth: Mucous membranes are moist.   Eyes:       Extraocular Movements: Extraocular movements intact.   Neck:      Comments: Midline scar present   Right sided tunneled dialysis line present.   Cardiovascular:      Rate and Rhythm: Normal rate and regular rhythm.      Pulses: Normal pulses.      Heart sounds: Normal heart sounds.   Pulmonary:      Effort: Pulmonary effort is normal. No respiratory distress.      Breath sounds: Normal breath sounds. No wheezing or rales.   Chest:      Chest wall: No tenderness.   Abdominal:      General: Abdomen is flat.      Palpations: Abdomen is soft.   Musculoskeletal:         General: Normal range of motion.      Right lower leg: No edema.      Left lower leg: No edema.   Skin:     General: Skin is warm.      Capillary Refill: Capillary refill takes less than 2 seconds.      Coloration: Skin is not jaundiced.      Findings: No bruising, lesion or rash.   Neurological:      Mental Status: She is alert.          Significant Labs:  CBC:   Recent Labs   Lab 06/24/24  0758   WBC 7.56   RBC 3.77*   HGB 9.0*   HCT 31.4*      MCV 83   MCH 23.9*   MCHC 28.7*     CMP:   Recent Labs   Lab 06/24/24  0758      CALCIUM 10.6*   ALBUMIN 2.9*   PROT 7.6   *   K 4.1   CO2 19*   CL 99   BUN 33*   CREATININE 7.8*   ALKPHOS 73   ALT 6*   AST 15   BILITOT 0.3     All labs within the past 24 hours have been reviewed.    Significant Imaging:    Assessment/Plan:     Renal/  Ayaz's gangrene  - Per chart     ID  * Sepsis  - Will collect two cultures from her tunneled dialysis line. One culture will be drawn from the arterial port and the second culture will be drawn from the venous port. I have communicated this with the dialysis nurses who will draw her cultures prior to starting dialysis  -Will also draw two sets of peripheral blood cultures to compare to.         Thank you for your consult. I will follow-up with patient. Please contact us if you have any additional questions.    Mathew Steinberg MD  Nephrology  [facility] -  Telemetry Stepdown

## 2024-06-24 NOTE — H&P (VIEW-ONLY)
[facility] - Telemetry Stepdown  Nephrology  Consult Note    Patient Name: Sarah Saravia  MRN: [id number]  Admission Date: 4/10/2024  Hospital Length of Stay: 75 days  Attending Provider: Soco Erickson MD   Primary Care Physician: Deanna, Primary Doctor  Principal Problem:Sepsis    IP consult to Interventional Nephrology  Consult performed by: Mathew Steinberg MD  Consult ordered by: Jatin Hutchins MD  Reason for consult: Eval for potential tunneled dialysis catheter infection.        Subjective:     HPI: The patient is a 53 y.o. 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 The patient is unable to provide adequate history. Additional history and patient information was obtained from patient's daughter, past medical records and ER records. Per chart, she was undergoing dialysis Wednesday and was noted to be more lethargic than usual despite completing her HD session. EMS was called, fever of a 100. In the ED, UA 2+ leuks, >100 WBC, many bacteria, WBC 17, CT abd/pelvis concerning for cystitis. Given vanc/zosyn and admitted.     Of note, the patient was initiated on RRT in February 2024 after being admitted with ALVARADO 2/2 iATN due to septic shock. Perm cath placed on 2/29/24. She was transferred to [facility] on 3/12. Interventional Nephrology was consulted for concerns of potential tunneled dialysis line infection. She developed fever on 6/22 with a T-max 38.1C and leukocytosis sporadically throughout her hospital stay with the most recent episode of leukocytosis being on 6/22 where it reached a peak of 18.94. She received meropenum through 6/22 and last dose of vancomycin was on 6/21. Cultures thus far are positive for Proteus Mirabilis ESBL from urine cultures on 4/11, Staph Epidermis in 2/2 vials on 4/10, pseudomonas in groin abscess on 3/8. All cultures  taken this hospital stay have been negative.       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    Hypermagnesemia 04/11/2024    POA, Mg 3.2  Daily chem       Morbid obesity     Necrotizing fasciitis        Past Surgical History:   Procedure Laterality Date    CLOSURE OF WOUND Right 2/22/2024    Procedure: CLOSURE, WOUND;  Surgeon: Steve Cole MD;  [facility];  Service: General;  Laterality: Right;    ESOPHAGOGASTRODUODENOSCOPY W/ PEG N/A 2/22/2024    Procedure: EGD, WITH PEG TUBE INSERTION;  Surgeon: Steve Cole MD;  Location: [facility] OR 2ND FLR;  Service: General;  Laterality: N/A;    INCISION AND DRAINAGE OF PERIRECTAL REGION N/A 1/29/2024    Procedure: INCISION AND DRAINAGE, PERIRECTAL REGION;  Surgeon: Axel Ramsay MD;  Location: [facility] OR;  Service: General;  Laterality: N/A;    LUMBAR PUNCTURE N/A 2/16/2024    Procedure: Lumbar Puncture;  Surgeon: Macy Boykin;  Location: [facility];  Service: Anesthesiology;  Laterality: N/A;    PLACEMENT, TRIALYSIS CATH Right 1/31/2024    Procedure: INSERTION, CATHETER, TRIPLE LUMEN, HEMODIALYSIS, TEMPORARY;  Surgeon: Alvin Junior MD;  Location: [facility] OR;  Service: General;  Laterality: Right;    REPLACEMENT OF WOUND VACUUM-ASSISTED CLOSURE DEVICE Right 2/12/2024    Procedure: REPLACEMENT, WOUND VAC;  Surgeon: Mundo Carmona MD;  [facility];  Service: General;  Laterality: Right;    REPLACEMENT OF WOUND VACUUM-ASSISTED CLOSURE DEVICE N/A 2/15/2024    Procedure: REPLACEMENT, WOUND VAC;  Surgeon: Steve Cole MD;  Location: [facility] OR 2ND FLR;  Service: General;  Laterality: N/A;    REPLACEMENT OF WOUND VACUUM-ASSISTED CLOSURE DEVICE Right 2/19/2024    Procedure: REPLACEMENT, WOUND VAC;  Surgeon: Steve Cole MD;  Location: [facility] OR 2ND FLR;  Service: General;  Laterality: Right;  RLE/groin    REPLACEMENT OF WOUND VACUUM-ASSISTED CLOSURE DEVICE Right 2/22/2024    Procedure:  REPLACEMENT, WOUND VAC;  Surgeon: Steve Cole MD;  [facility];  Service: General;  Laterality: Right;    TRACHEOSTOMY N/A 2/22/2024    Procedure: CREATION, TRACHEOSTOMY;  Surgeon: Steve Cole MD;  [facility];  Service: General;  Laterality: N/A;    WOUND DEBRIDEMENT Bilateral 2/2/2024    Procedure: DEBRIDEMENT, WOUND;  Surgeon: Steve Cole MD;  [facility];  Service: General;  Laterality: Bilateral;  Bilateral groin  Possible wound vac placement    WOUND DEBRIDEMENT Right 2/6/2024    Procedure: DEBRIDEMENT, WOUND, replace wound vac, possible closure;  Surgeon: Steve Cloe MD;  [facility];  Service: General;  Laterality: Right;  RLE    WOUND DEBRIDEMENT Right 2/9/2024    Procedure: DEBRIDEMENT, WOUND w wound vac change;  Surgeon: Steve Cole MD;  Location: [facility] OR [address] [place] [address];  Service: General;  Laterality: Right;  RLE    WOUND DEBRIDEMENT Right 2/12/2024    Procedure: R thigh wound debridement;  Surgeon: Mundo Carmona MD;  Location: [address];  Service: General;  Laterality: Right;    WOUND EXPLORATION Right 1/31/2024    Procedure: IRRIGATION & DEBRIDEMENT, WOUND DEBRIDEMENT;  Surgeon: Alvin Junior MD;  Location: [facility];  Service: General;  Laterality: Right;       Review of patient's allergies indicates:  No Known Allergies  Current Facility-Administered Medications   Medication Frequency    0.9%  NaCl infusion Once    acetaminophen oral solution 650 mg Q6H PRN    ascorbic acid (vitamin C) tablet 500 mg BID    aspirin chewable tablet 81 mg Daily    atorvastatin tablet 40 mg Daily    dextrose 10% bolus 125 mL 125 mL PRN    dextrose 10% bolus 250 mL 250 mL PRN    epoetin merry injection 6,100 Units Every Mon, Wed, Fri    glucagon (human recombinant) injection 1 mg PRN    glucose chewable tablet 16 g PRN    glucose chewable tablet 24 g PRN    heparin (porcine) injection 1,000 Units PRN    heparin (porcine) injection 3,000 Units PRN     heparin (porcine) injection 5,000 Units Q8H    hepatitis B virus vacc.rec(PF) injection 20 mcg vaccine x 1 dose    insulin aspart U-100 pen 0-10 Units QID (AC + HS) PRN    insulin glargine U-100 (Lantus) pen 13 Units QHS    metoprolol tartrate (LOPRESSOR) tablet 25 mg BID    ondansetron 4 mg/5 mL solution 4 mg Q6H PRN    oxyCODONE 5 mg/5 mL solution 5 mg Q4H PRN    pantoprazole suspension 40 mg Daily    polyethylene glycol packet 17 g TID    sodium chloride 0.9% bolus 250 mL 250 mL PRN    sodium chloride 0.9% flush 10 mL Q12H PRN     Family History    None       Tobacco Use    Smoking status: Unknown    Smokeless tobacco: Not on file   Substance and Sexual Activity    Alcohol use: Not on file    Drug use: Not on file    Sexual activity: Not on file     Review of Systems   Constitutional:  Positive for fever.   Respiratory:  Negative for chest tightness and shortness of breath.    Cardiovascular:  Negative for chest pain.   Gastrointestinal:  Negative for abdominal distention.   Neurological:  Negative for dizziness, light-headedness and numbness.     Objective:     Vital Signs (Most Recent):  Temp: 98.6 °F (37 °C) (06/24/24 0730)  Pulse: 90 (06/24/24 1116)  Resp: 18 (06/24/24 0730)  BP: 125/82 (06/24/24 1115)  SpO2: 100 % (06/24/24 1115) Vital Signs (24h Range):  Temp:  [97.9 °F (36.6 °C)-98.6 °F (37 °C)] 98.6 °F (37 °C)  Pulse:  [83-99] 90  Resp:  [17-18] 18  SpO2:  [94 %-100 %] 100 %  BP: (102-141)/(70-82) 125/82     Weight: 105.5 kg (232 lb 9.4 oz) (06/19/24 1052)  Body mass index is 36.43 kg/m².  Body surface area is 2.23 meters squared.    I/O last 3 completed shifts:  In: 1468 [NG/GT:1468]  Out: 0      Physical Exam  Constitutional:       Appearance: Normal appearance. She is obese.   HENT:      Head: Normocephalic and atraumatic.      Right Ear: External ear normal.      Left Ear: External ear normal.      Nose: Nose normal.      Mouth/Throat:      Mouth: Mucous membranes are moist.   Eyes:       Extraocular Movements: Extraocular movements intact.   Neck:      Comments: Midline scar present   Right sided tunneled dialysis line present.   Cardiovascular:      Rate and Rhythm: Normal rate and regular rhythm.      Pulses: Normal pulses.      Heart sounds: Normal heart sounds.   Pulmonary:      Effort: Pulmonary effort is normal. No respiratory distress.      Breath sounds: Normal breath sounds. No wheezing or rales.   Chest:      Chest wall: No tenderness.   Abdominal:      General: Abdomen is flat.      Palpations: Abdomen is soft.   Musculoskeletal:         General: Normal range of motion.      Right lower leg: No edema.      Left lower leg: No edema.   Skin:     General: Skin is warm.      Capillary Refill: Capillary refill takes less than 2 seconds.      Coloration: Skin is not jaundiced.      Findings: No bruising, lesion or rash.   Neurological:      Mental Status: She is alert.          Significant Labs:  CBC:   Recent Labs   Lab 06/24/24  0758   WBC 7.56   RBC 3.77*   HGB 9.0*   HCT 31.4*      MCV 83   MCH 23.9*   MCHC 28.7*     CMP:   Recent Labs   Lab 06/24/24  0758      CALCIUM 10.6*   ALBUMIN 2.9*   PROT 7.6   *   K 4.1   CO2 19*   CL 99   BUN 33*   CREATININE 7.8*   ALKPHOS 73   ALT 6*   AST 15   BILITOT 0.3     All labs within the past 24 hours have been reviewed.    Significant Imaging:    Assessment/Plan:     Renal/  Ayaz's gangrene  - Per chart     ID  * Sepsis  - Will collect two cultures from her tunneled dialysis line. One culture will be drawn from the arterial port and the second culture will be drawn from the venous port. I have communicated this with the dialysis nurses who will draw her cultures prior to starting dialysis  -Will also draw two sets of peripheral blood cultures to compare to.         Thank you for your consult. I will follow-up with patient. Please contact us if you have any additional questions.    Mathew Steinberg MD  Nephrology  [facility] -  Telemetry Stepdown

## 2024-06-24 NOTE — PT/OT/SLP PROGRESS
Physical Therapy      Patient Name:  Sarah Saravia   MRN:  [id number]    Patient not seen today secondary to Dialysis (2 attempts for tx session: 1. Pt URIEL at HD at 11:30 am; 2. Pt still not back at 3:00 pm). Will follow-up on next scheduled visit.

## 2024-06-24 NOTE — NURSING
Nurses Note -- 4 Eyes      06/23/2024   19:50 PM      Skin assessed during: Q Shift Change      [] No Altered Skin Integrity Present    []Prevention Measures Documented      [x] Yes- Altered Skin Integrity Present or Discovered   [] LDA Added if Not in Epic (Describe Wound)   [] New Altered Skin Integrity was Present on Admit and Documented in LDA   [] Wound Image Taken    Wound Care Consulted? No    Attending Nurse:  FLORENCIO Nguyen RN     Second RN/Staff Member:  ERROL Kenyon RN

## 2024-06-24 NOTE — ASSESSMENT & PLAN NOTE
Adjusting insulin to account for diabetic TF    Patient's FSGs are controlled on current medication regimen.  Last A1c reviewed-   Lab Results   Component Value Date    HGBA1C 6.2 (H) 05/27/2024     Most recent fingerstick glucose reviewed-   Recent Labs   Lab 06/23/24  2244   POCTGLUCOSE 107       Current correctional scale  Medium  Maintain anti-hyperglycemic dose as follows-   Antihyperglycemics (From admission, onward)    Start     Stop Route Frequency Ordered    06/09/24 2100  insulin glargine U-100 (Lantus) pen 13 Units         -- SubQ Nightly 06/09/24 0756    06/09/24 1255  insulin aspart U-100 pen 0-10 Units         -- SubQ Before meals & nightly PRN 06/09/24 1155        Hold Oral hypoglycemics while patient is in the hospital.  POCT glucose checks   Continue tube feeds

## 2024-06-24 NOTE — PLAN OF CARE
Problem: Chronic Kidney Disease  Goal: Electrolyte Balance  6/24/2024 1157 by Bren Ramirez RN  Outcome: Progressing  6/24/2024 1150 by Bren Ramirez RN  Outcome: Progressing  Goal: Fluid Balance  6/24/2024 1157 by Bren Ramirez RN  Outcome: Progressing  6/24/2024 1150 by Bren Ramirez RN  Outcome: Progressing

## 2024-06-24 NOTE — SUBJECTIVE & OBJECTIVE
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    Hypermagnesemia 04/11/2024    POA, Mg 3.2  Daily chem       Morbid obesity     Necrotizing fasciitis        Past Surgical History:   Procedure Laterality Date    CLOSURE OF WOUND Right 2/22/2024    Procedure: CLOSURE, WOUND;  Surgeon: Steve Cole MD;  Location: [facility] OR [address] [place] [address];  Service: General;  Laterality: Right;    ESOPHAGOGASTRODUODENOSCOPY W/ PEG N/A 2/22/2024    Procedure: EGD, WITH PEG TUBE INSERTION;  Surgeon: Steve Cole MD;  [facility];  Service: General;  Laterality: N/A;    INCISION AND DRAINAGE OF PERIRECTAL REGION N/A 1/29/2024    Procedure: INCISION AND DRAINAGE, PERIRECTAL REGION;  Surgeon: Axel Ramsay MD;  Location: [facility] OR;  Service: General;  Laterality: N/A;    LUMBAR PUNCTURE N/A 2/16/2024    Procedure: Lumbar Puncture;  Surgeon: Macy Boykin;  Location: [facility];  Service: Anesthesiology;  Laterality: N/A;    PLACEMENT, TRIALYSIS CATH Right 1/31/2024    Procedure: INSERTION, CATHETER, TRIPLE LUMEN, HEMODIALYSIS, TEMPORARY;  Surgeon: Alvin Junior MD;  Location: [facility] OR;  Service: General;  Laterality: Right;    REPLACEMENT OF WOUND VACUUM-ASSISTED CLOSURE DEVICE Right 2/12/2024    Procedure: REPLACEMENT, WOUND VAC;  Surgeon: Mundo Carmona MD;  Location: [facility] OR [address] [place] [address];  Service: General;  Laterality: Right;    REPLACEMENT OF WOUND VACUUM-ASSISTED CLOSURE DEVICE N/A 2/15/2024    Procedure: REPLACEMENT, WOUND VAC;  Surgeon: Steve Cole MD;  [facility];  Service: General;  Laterality: N/A;    REPLACEMENT OF WOUND VACUUM-ASSISTED CLOSURE DEVICE Right 2/19/2024    Procedure: REPLACEMENT, WOUND VAC;  Surgeon: Steve Cole MD;  [facility];  Service: General;  Laterality: Right;  RLE/groin    REPLACEMENT OF WOUND VACUUM-ASSISTED CLOSURE DEVICE Right 2/22/2024    Procedure: REPLACEMENT, WOUND VAC;  Surgeon: Steve Cole  MD;  Location: [facility] OR [facility];  Service: General;  Laterality: Right;    TRACHEOSTOMY N/A 2/22/2024    Procedure: CREATION, TRACHEOSTOMY;  Surgeon: Steve Cole MD;  Location: [facility] OR [facility];  Service: General;  Laterality: N/A;    WOUND DEBRIDEMENT Bilateral 2/2/2024    Procedure: DEBRIDEMENT, WOUND;  Surgeon: Steve Cole MD;  [facility];  Service: General;  Laterality: Bilateral;  Bilateral groin  Possible wound vac placement    WOUND DEBRIDEMENT Right 2/6/2024    Procedure: DEBRIDEMENT, WOUND, replace wound vac, possible closure;  Surgeon: Steve Cole MD;  [facility];  Service: General;  Laterality: Right;  RLE    WOUND DEBRIDEMENT Right 2/9/2024    Procedure: DEBRIDEMENT, WOUND w wound vac change;  Surgeon: Steve Cole MD;  [facility];  Service: General;  Laterality: Right;  RLE    WOUND DEBRIDEMENT Right 2/12/2024    Procedure: R thigh wound debridement;  Surgeon: Mundo Carmona MD;  [facility];  Service: General;  Laterality: Right;    WOUND EXPLORATION Right 1/31/2024    Procedure: IRRIGATION & DEBRIDEMENT, WOUND DEBRIDEMENT;  Surgeon: Alvin Junior MD;  Location: [facility];  Service: General;  Laterality: Right;       Review of patient's allergies indicates:  No Known Allergies  Current Facility-Administered Medications   Medication Frequency    0.9%  NaCl infusion Once    acetaminophen oral solution 650 mg Q6H PRN    ascorbic acid (vitamin C) tablet 500 mg BID    aspirin chewable tablet 81 mg Daily    atorvastatin tablet 40 mg Daily    dextrose 10% bolus 125 mL 125 mL PRN    dextrose 10% bolus 250 mL 250 mL PRN    epoetin merry injection 6,100 Units Every Mon, Wed, Fri    glucagon (human recombinant) injection 1 mg PRN    glucose chewable tablet 16 g PRN    glucose chewable tablet 24 g PRN    heparin (porcine) injection 1,000 Units PRN    heparin (porcine) injection 3,000 Units PRN    heparin (porcine) injection 5,000 Units Q8H     hepatitis B virus vacc.rec(PF) injection 20 mcg vaccine x 1 dose    insulin aspart U-100 pen 0-10 Units QID (AC + HS) PRN    insulin glargine U-100 (Lantus) pen 13 Units QHS    metoprolol tartrate (LOPRESSOR) tablet 25 mg BID    ondansetron 4 mg/5 mL solution 4 mg Q6H PRN    oxyCODONE 5 mg/5 mL solution 5 mg Q4H PRN    pantoprazole suspension 40 mg Daily    polyethylene glycol packet 17 g TID    sodium chloride 0.9% bolus 250 mL 250 mL PRN    sodium chloride 0.9% flush 10 mL Q12H PRN     Family History    None       Tobacco Use    Smoking status: Unknown    Smokeless tobacco: Not on file   Substance and Sexual Activity    Alcohol use: Not on file    Drug use: Not on file    Sexual activity: Not on file     Review of Systems   Constitutional:  Positive for fever.   Respiratory:  Negative for chest tightness and shortness of breath.    Cardiovascular:  Negative for chest pain.   Gastrointestinal:  Negative for abdominal distention.   Neurological:  Negative for dizziness, light-headedness and numbness.     Objective:     Vital Signs (Most Recent):  Temp: 98.6 °F (37 °C) (06/24/24 0730)  Pulse: 90 (06/24/24 1116)  Resp: 18 (06/24/24 0730)  BP: 125/82 (06/24/24 1115)  SpO2: 100 % (06/24/24 1115) Vital Signs (24h Range):  Temp:  [97.9 °F (36.6 °C)-98.6 °F (37 °C)] 98.6 °F (37 °C)  Pulse:  [83-99] 90  Resp:  [17-18] 18  SpO2:  [94 %-100 %] 100 %  BP: (102-141)/(70-82) 125/82     Weight: 105.5 kg (232 lb 9.4 oz) (06/19/24 1052)  Body mass index is 36.43 kg/m².  Body surface area is 2.23 meters squared.    I/O last 3 completed shifts:  In: 1468 [NG/GT:1468]  Out: 0      Physical Exam  Constitutional:       Appearance: Normal appearance. She is obese.   HENT:      Head: Normocephalic and atraumatic.      Right Ear: External ear normal.      Left Ear: External ear normal.      Nose: Nose normal.      Mouth/Throat:      Mouth: Mucous membranes are moist.   Eyes:      Extraocular Movements: Extraocular movements intact.   Neck:       Comments: Midline scar present   Right sided tunneled dialysis line present.   Cardiovascular:      Rate and Rhythm: Normal rate and regular rhythm.      Pulses: Normal pulses.      Heart sounds: Normal heart sounds.   Pulmonary:      Effort: Pulmonary effort is normal. No respiratory distress.      Breath sounds: Normal breath sounds. No wheezing or rales.   Chest:      Chest wall: No tenderness.   Abdominal:      General: Abdomen is flat.      Palpations: Abdomen is soft.   Musculoskeletal:         General: Normal range of motion.      Right lower leg: No edema.      Left lower leg: No edema.   Skin:     General: Skin is warm.      Capillary Refill: Capillary refill takes less than 2 seconds.      Coloration: Skin is not jaundiced.      Findings: No bruising, lesion or rash.   Neurological:      Mental Status: She is alert.          Significant Labs:  CBC:   Recent Labs   Lab 06/24/24  0758   WBC 7.56   RBC 3.77*   HGB 9.0*   HCT 31.4*      MCV 83   MCH 23.9*   MCHC 28.7*     CMP:   Recent Labs   Lab 06/24/24  0758      CALCIUM 10.6*   ALBUMIN 2.9*   PROT 7.6   *   K 4.1   CO2 19*   CL 99   BUN 33*   CREATININE 7.8*   ALKPHOS 73   ALT 6*   AST 15   BILITOT 0.3     All labs within the past 24 hours have been reviewed.    Significant Imaging:

## 2024-06-24 NOTE — PLAN OF CARE
Problem: Physical Therapy  Goal: Physical Therapy Goal  Description: Goals to be met by: 24   Goals remain appropriate 5/3/2024 to be met by 24  Goals updated 2024 to be met by 24  Goals updated 24 to be met by 24  Goals remain appropriate 24 to be met by 24  Goals remain appropriate to be met by 24    Patient will increase functional independence with mobility by performin. Supine to sit with Maximum Assistance  2. Sit to supine with Maximum Assistance  3. Rolling to Left and Right with Moderate Assistance.  4. Sitting at edge of bed 5 minutes with Moderate Assistance- MET , monitor consistency  4a. Sitting at edge of bed 10 minutes with Supervision  5. Lower extremity exercise program x20 reps per handout, with assistance as needed  6. Sit to stand transfer with Maximum Assistance with or without LRAD  7. Stand for 2 minutes with Maximum Assistance with or without LRAD    Goals reviewed this date. Will continue to treat patient as appropriate.  2024 1008 by Maeve Cordova, PT  Outcome: Progressing

## 2024-06-24 NOTE — ASSESSMENT & PLAN NOTE
This patient does have evidence of infective focus. My overall impression is sepsis. Source: Skin and Soft Tissue (location Abdominal). Not requiring pressors. Source control achieved by: vanc/meropenem.  Concerns septic source may be PEG site with associated purulent wound despite Wound Care attempts to protect with barriers. PEG placed by General Surgery 2/2024. General Surgery consulted regarding PEG site ordered CT No evidence of infection  US extremities negative for thrombus   CT pan scan w/ IV contrast without evidence of infection  Interventional nephrology consulted for line eval - low suspicion for clot, getting blood cx to r/o infected line    - No fevers since abx dcd  - Peripheral smear given cyclic nature of fevers/leukocytosis.  - ID reconsulted for further assistance

## 2024-06-24 NOTE — NURSING
Patient is in contact  isolation . 3.5 hours treatment completed at 1405 hours . 1500ml UF removed.Patient tolerated well . Both right IJ catheters were flushed , locked with cathflow, labelled and wrapped with tape and guaze and should remain un flushed until next  dialysis.Report  given to ELISABETH DEWITT .Patient was transported  from [facility]  to 8092 in bed .

## 2024-06-24 NOTE — ASSESSMENT & PLAN NOTE
- Will collect two cultures from her tunneled dialysis line. One culture will be drawn from the arterial port and the second culture will be drawn from the venous port. I have communicated this with the dialysis nurses who will draw her cultures prior to starting dialysis  -Will also draw two sets of peripheral blood cultures to compare to.

## 2024-06-24 NOTE — NURSING
Patient is in contact  isolation .3.5hours  dialysis started at  1021hours . Patient arrived on bed . VSS . Right HD IJ catheters flushed good but Blue  lumen aspirated sluggish. Treatment refugio  well . Report  received from ELISABETH Hernandez. Treatment tolerating Well.

## 2024-06-25 LAB
ALBUMIN SERPL BCP-MCNC: 3.2 G/DL (ref 3.5–5.2)
ALP SERPL-CCNC: 84 U/L (ref 55–135)
ALT SERPL W/O P-5'-P-CCNC: 6 U/L (ref 10–44)
ANION GAP SERPL CALC-SCNC: 17 MMOL/L (ref 8–16)
AST SERPL-CCNC: 18 U/L (ref 10–40)
BACTERIA #/AREA URNS AUTO: ABNORMAL /HPF
BASOPHILS # BLD AUTO: 0.08 K/UL (ref 0–0.2)
BASOPHILS NFR BLD: 0.6 % (ref 0–1.9)
BILIRUB SERPL-MCNC: 0.4 MG/DL (ref 0.1–1)
BILIRUB UR QL STRIP: NEGATIVE
BUN SERPL-MCNC: 20 MG/DL (ref 6–20)
CALCIUM SERPL-MCNC: 10.2 MG/DL (ref 8.7–10.5)
CHLORIDE SERPL-SCNC: 99 MMOL/L (ref 95–110)
CLARITY UR REFRACT.AUTO: ABNORMAL
CO2 SERPL-SCNC: 19 MMOL/L (ref 23–29)
COLOR UR AUTO: ABNORMAL
CREAT SERPL-MCNC: 5.2 MG/DL (ref 0.5–1.4)
DIFFERENTIAL METHOD BLD: ABNORMAL
EOSINOPHIL # BLD AUTO: 0.3 K/UL (ref 0–0.5)
EOSINOPHIL NFR BLD: 2.2 % (ref 0–8)
ERYTHROCYTE [DISTWIDTH] IN BLOOD BY AUTOMATED COUNT: 16.9 % (ref 11.5–14.5)
EST. GFR  (NO RACE VARIABLE): 9.3 ML/MIN/1.73 M^2
GLUCOSE SERPL-MCNC: 122 MG/DL (ref 70–110)
GLUCOSE UR QL STRIP: NEGATIVE
HCT VFR BLD AUTO: 35.1 % (ref 37–48.5)
HGB BLD-MCNC: 10.5 G/DL (ref 12–16)
HGB UR QL STRIP: ABNORMAL
HYALINE CASTS UR QL AUTO: 0 /LPF
IMM GRANULOCYTES # BLD AUTO: 0.06 K/UL (ref 0–0.04)
IMM GRANULOCYTES NFR BLD AUTO: 0.4 % (ref 0–0.5)
KETONES UR QL STRIP: NEGATIVE
LEUKOCYTE ESTERASE UR QL STRIP: ABNORMAL
LYMPHOCYTES # BLD AUTO: 2.1 K/UL (ref 1–4.8)
LYMPHOCYTES NFR BLD: 15.5 % (ref 18–48)
MAGNESIUM SERPL-MCNC: 2.2 MG/DL (ref 1.6–2.6)
MCH RBC QN AUTO: 24.6 PG (ref 27–31)
MCHC RBC AUTO-ENTMCNC: 29.9 G/DL (ref 32–36)
MCV RBC AUTO: 82 FL (ref 82–98)
MICROSCOPIC COMMENT: ABNORMAL
MONOCYTES # BLD AUTO: 1.4 K/UL (ref 0.3–1)
MONOCYTES NFR BLD: 10.8 % (ref 4–15)
NEUTROPHILS # BLD AUTO: 9.5 K/UL (ref 1.8–7.7)
NEUTROPHILS NFR BLD: 70.5 % (ref 38–73)
NITRITE UR QL STRIP: NEGATIVE
NRBC BLD-RTO: 0 /100 WBC
PH UR STRIP: 6 [PH] (ref 5–8)
PHOSPHATE SERPL-MCNC: 3.4 MG/DL (ref 2.7–4.5)
PLATELET # BLD AUTO: 348 K/UL (ref 150–450)
PMV BLD AUTO: 9.5 FL (ref 9.2–12.9)
POCT GLUCOSE: 148 MG/DL (ref 70–110)
POTASSIUM SERPL-SCNC: 4 MMOL/L (ref 3.5–5.1)
PROCALCITONIN SERPL IA-MCNC: 6.8 NG/ML
PROT SERPL-MCNC: 8.5 G/DL (ref 6–8.4)
PROT UR QL STRIP: ABNORMAL
RBC # BLD AUTO: 4.26 M/UL (ref 4–5.4)
RBC #/AREA URNS AUTO: 43 /HPF (ref 0–4)
SODIUM SERPL-SCNC: 135 MMOL/L (ref 136–145)
SP GR UR STRIP: 1.02 (ref 1–1.03)
URN SPEC COLLECT METH UR: ABNORMAL
WBC # BLD AUTO: 13.39 K/UL (ref 3.9–12.7)
WBC #/AREA URNS AUTO: >100 /HPF (ref 0–5)
WBC CLUMPS UR QL AUTO: ABNORMAL

## 2024-06-25 PROCEDURE — 25000003 PHARM REV CODE 250

## 2024-06-25 PROCEDURE — 83735 ASSAY OF MAGNESIUM: CPT

## 2024-06-25 PROCEDURE — 94761 N-INVAS EAR/PLS OXIMETRY MLT: CPT

## 2024-06-25 PROCEDURE — 36415 COLL VENOUS BLD VENIPUNCTURE: CPT

## 2024-06-25 PROCEDURE — 92526 ORAL FUNCTION THERAPY: CPT

## 2024-06-25 PROCEDURE — 92507 TX SP LANG VOICE COMM INDIV: CPT

## 2024-06-25 PROCEDURE — 80053 COMPREHEN METABOLIC PANEL: CPT

## 2024-06-25 PROCEDURE — 84145 PROCALCITONIN (PCT): CPT

## 2024-06-25 PROCEDURE — 99232 SBSQ HOSP IP/OBS MODERATE 35: CPT | Mod: ,,, | Performed by: NURSE PRACTITIONER

## 2024-06-25 PROCEDURE — 84100 ASSAY OF PHOSPHORUS: CPT

## 2024-06-25 PROCEDURE — 85025 COMPLETE CBC W/AUTO DIFF WBC: CPT

## 2024-06-25 PROCEDURE — 87086 URINE CULTURE/COLONY COUNT: CPT

## 2024-06-25 PROCEDURE — 97530 THERAPEUTIC ACTIVITIES: CPT

## 2024-06-25 PROCEDURE — 97530 THERAPEUTIC ACTIVITIES: CPT | Mod: CQ

## 2024-06-25 PROCEDURE — 97535 SELF CARE MNGMENT TRAINING: CPT

## 2024-06-25 PROCEDURE — 63600175 PHARM REV CODE 636 W HCPCS

## 2024-06-25 PROCEDURE — 27000207 HC ISOLATION

## 2024-06-25 PROCEDURE — 20600001 HC STEP DOWN PRIVATE ROOM

## 2024-06-25 PROCEDURE — 99223 1ST HOSP IP/OBS HIGH 75: CPT | Mod: ,,, | Performed by: INTERNAL MEDICINE

## 2024-06-25 PROCEDURE — 25000003 PHARM REV CODE 250: Performed by: STUDENT IN AN ORGANIZED HEALTH CARE EDUCATION/TRAINING PROGRAM

## 2024-06-25 PROCEDURE — 81001 URINALYSIS AUTO W/SCOPE: CPT

## 2024-06-25 PROCEDURE — 87040 BLOOD CULTURE FOR BACTERIA: CPT | Mod: 59

## 2024-06-25 RX ORDER — SODIUM CHLORIDE 9 MG/ML
INJECTION, SOLUTION INTRAVENOUS ONCE
Status: COMPLETED | OUTPATIENT
Start: 2024-06-26 | End: 2024-06-26

## 2024-06-25 RX ADMIN — METOPROLOL TARTRATE 25 MG: 25 TABLET, FILM COATED ORAL at 09:06

## 2024-06-25 RX ADMIN — INSULIN GLARGINE 13 UNITS: 100 INJECTION, SOLUTION SUBCUTANEOUS at 09:06

## 2024-06-25 RX ADMIN — POLYETHYLENE GLYCOL 3350 17 G: 17 POWDER, FOR SOLUTION ORAL at 09:06

## 2024-06-25 RX ADMIN — HEPARIN SODIUM 5000 UNITS: 5000 INJECTION INTRAVENOUS; SUBCUTANEOUS at 03:06

## 2024-06-25 RX ADMIN — ATORVASTATIN CALCIUM 40 MG: 40 TABLET, FILM COATED ORAL at 09:06

## 2024-06-25 RX ADMIN — PANTOPRAZOLE SODIUM 40 MG: 40 GRANULE, DELAYED RELEASE ORAL at 10:06

## 2024-06-25 RX ADMIN — HEPARIN SODIUM 5000 UNITS: 5000 INJECTION INTRAVENOUS; SUBCUTANEOUS at 05:06

## 2024-06-25 RX ADMIN — ASPIRIN 81 MG CHEWABLE TABLET 81 MG: 81 TABLET CHEWABLE at 09:06

## 2024-06-25 RX ADMIN — POLYETHYLENE GLYCOL 3350 17 G: 17 POWDER, FOR SOLUTION ORAL at 03:06

## 2024-06-25 RX ADMIN — Medication 500 MG: at 09:06

## 2024-06-25 RX ADMIN — HEPARIN SODIUM 5000 UNITS: 5000 INJECTION INTRAVENOUS; SUBCUTANEOUS at 09:06

## 2024-06-25 NOTE — SUBJECTIVE & OBJECTIVE
Interval History: NAEON. Patient without fevers, but now with leukocytosis, exam without focal findings    Review of Systems   Unable to perform ROS: Patient nonverbal     Objective:     Vital Signs (Most Recent):  Temp: 98.3 °F (36.8 °C) (06/25/24 1139)  Pulse: 92 (06/25/24 1225)  Resp: 19 (06/25/24 1139)  BP: 110/67 (06/25/24 1139)  SpO2: 98 % (06/25/24 1300) Vital Signs (24h Range):  Temp:  [98.3 °F (36.8 °C)-99.8 °F (37.7 °C)] 98.3 °F (36.8 °C)  Pulse:  [] 92  Resp:  [17-20] 19  SpO2:  [98 %-100 %] 98 %  BP: (100-126)/(67-85) 110/67     Weight: 105.5 kg (232 lb 9.4 oz)  Body mass index is 36.43 kg/m².    Intake/Output Summary (Last 24 hours) at 6/25/2024 1415  Last data filed at 6/25/2024 0533  Gross per 24 hour   Intake 716.53 ml   Output --   Net 716.53 ml         Physical Exam  Constitutional:       Appearance: Normal appearance.   HENT:      Head: Normocephalic and atraumatic.      Right Ear: External ear normal.      Left Ear: External ear normal.   Cardiovascular:      Rate and Rhythm: Normal rate and regular rhythm.      Pulses: Normal pulses.      Heart sounds: Normal heart sounds.   Pulmonary:      Effort: Pulmonary effort is normal.      Breath sounds: Normal breath sounds.   Abdominal:      Palpations: Abdomen is soft.   Musculoskeletal:         General: Normal range of motion.   Skin:     General: Skin is warm.      Capillary Refill: Capillary refill takes less than 2 seconds.   Neurological:      General: No focal deficit present.             Significant Labs: All pertinent labs within the past 24 hours have been reviewed.  CBC:   Recent Labs   Lab 06/24/24  0758 06/25/24  0431   WBC 7.56 13.39*   HGB 9.0* 10.5*   HCT 31.4* 35.1*    348     CMP:   Recent Labs   Lab 06/24/24  0758 06/25/24  0431   * 135*   K 4.1 4.0   CL 99 99   CO2 19* 19*    122*   BUN 33* 20   CREATININE 7.8* 5.2*   CALCIUM 10.6* 10.2   PROT 7.6 8.5*   ALBUMIN 2.9* 3.2*   BILITOT 0.3 0.4   ALKPHOS 73 84    AST 15 18   ALT 6* 6*   ANIONGAP 17* 17*       Significant Imaging: I have reviewed all pertinent imaging results/findings within the past 24 hours.

## 2024-06-25 NOTE — ASSESSMENT & PLAN NOTE
53 y.o. Black or  Female ESRD-HD M-W-F at [facility] presents to ED on 4/10/2024 with UTI.    Of note, the patient was initiated on RRT in February 2024 after being admitted with ALVARADO 2/2 iATN due to septic shock. Perm cath placed on 2/29/24. She was transferred to [facility] on 3/12. Deemed ESRD at [facility].  Nephrology consulted for inpatient ESRD-HD management    Assessment:   - HD completed yesterday. Plans for HD tomorrow.   - Continue to monitor intake and output  - Please avoid gadolinium, fleets, phos-based laxatives, NSAIDs  - Dialysis thrice weekly unless more urgent indications arise. Will evaluate RRT requirements Daily.      Lab Results   Component Value Date    FESATURATED 10 (L) 03/15/2024    FERRITIN 414 (H) 03/15/2024       - Goal in ESRD is Hgb of 10-11. Continue EPO.       Secondary hyperparathyroid of renal origin   - no indication for phos binders

## 2024-06-25 NOTE — ASSESSMENT & PLAN NOTE
Patient has hyponatremia which is uncontrolled,We will aim to correct the sodium by 4-6mEq in 24 hours. We will monitor sodium Daily. The hyponatremia is due to ESRD. Discussed with nephrology, dialysate bath adjusted to account for and correct hyponatremia.  Recent Labs   Lab 06/25/24  0431   *

## 2024-06-25 NOTE — SUBJECTIVE & OBJECTIVE
Interval History:     ID re consulted as continues with intermittent fevers, tachycardia, leukocytosis.     Review of Systems   Unable to perform ROS: Patient nonverbal     Objective:     Vital Signs (Most Recent):  Temp: 98.3 °F (36.8 °C) (06/25/24 1139)  Pulse: 100 (06/25/24 1525)  Resp: 19 (06/25/24 1139)  BP: 110/67 (06/25/24 1139)  SpO2: 98 % (06/25/24 1300) Vital Signs (24h Range):  Temp:  [98.3 °F (36.8 °C)-99.8 °F (37.7 °C)] 98.3 °F (36.8 °C)  Pulse:  [] 100  Resp:  [17-20] 19  SpO2:  [98 %-100 %] 98 %  BP: (100-126)/(67-80) 110/67     Weight: 105.5 kg (232 lb 9.4 oz)  Body mass index is 36.43 kg/m².    Estimated Creatinine Clearance: 15.6 mL/min (A) (based on SCr of 5.2 mg/dL (H)).     Physical Exam  Vitals reviewed.   Constitutional:       General: She is not in acute distress.     Appearance: She is not ill-appearing.   HENT:      Head: Normocephalic.      Nose: Nose normal. No congestion.      Mouth/Throat:      Mouth: Mucous membranes are moist. No injury or oral lesions.      Tongue: No lesions.   Eyes:      General:         Right eye: No discharge.         Left eye: No discharge.      Conjunctiva/sclera: Conjunctivae normal.   Cardiovascular:      Rate and Rhythm: Normal rate and regular rhythm.      Pulses: Normal pulses.      Heart sounds: Normal heart sounds. No murmur heard.  Pulmonary:      Effort: Pulmonary effort is normal. No respiratory distress.      Breath sounds: Normal breath sounds. No stridor. No wheezing, rhonchi or rales.   Abdominal:      General: Bowel sounds are normal. There is no distension.      Palpations: Abdomen is soft.      Tenderness: There is no abdominal tenderness. There is no right CVA tenderness or left CVA tenderness.      Comments: Left PEG site without skin breakdown, drainage, redness, swelling.    Musculoskeletal:      Cervical back: Normal range of motion.      Right lower leg: No edema.      Left lower leg: No edema.   Skin:     Findings: No erythema,  lesion or rash.      Comments: With right tunneled line. Without surrounding redness, swelling. Without tenderness to palpation.    Neurological:      Mental Status: She is alert. Mental status is at baseline. She is disoriented.          Significant Labs: All pertinent labs within the past 24 hours have been reviewed.    Significant Imaging: I have reviewed all pertinent imaging results/findings within the past 24 hours.

## 2024-06-25 NOTE — PT/OT/SLP PROGRESS
Speech Language Pathology Treatment    Patient Name:  Sarah Saravia   MRN:  [id number]  Admitting Diagnosis: Sepsis    Recommendations:                 General Recommendations:  Dysphagia therapy and Speech/language therapy  Diet recommendations:  NPO, Liquid Diet Level: NPO - pt ok to receive thin liquids for pleasure  Aspiration Precautions: Continue alternate means of nutrition, HOB to 90 degrees, Monitor for s/s of aspiration, and Strict aspiration precautions   General Precautions: Standard, aspiration, aphasia, NPO  Communication strategies:  go to room if call light pushed    Assessment:     Sarah Saravia is a 53 y.o. female with an SLP diagnosis of Aphasia, Dysphagia, and Cognitive-Linguistic Impairment.     Subjective     Pt remained nonverbal/nonvocal t/o session.     Pain/Comfort:  Pain Rating 1: 0/10    Respiratory Status: Room air    Objective:     Has the patient been evaluated by SLP for swallowing?   Yes  Keep patient NPO? Yes   Current Respiratory Status:        Pt accepted thin water trials via cup x 1 and straw x 6.  Mild holding present for thin liquids.  Pt also accepted small bites of Jello x 2.  Minimal oral manipulation observed initially. Pt proceeded to display prolonged holding (over 2 minutes) and piecemeal deglutition of Jello.  Straw sips of water and empty spoon placed in oral cavity required to finally clear oral cavity of Jello trials. SLP recommends pt remain NPO except for thin liquids for comfort/pleasure. During aphasia therapy, pt answered simple yes/no q's via head nod/shake on 2 out of 5 trials (no response elicited x 2).  Pt was able to follow 1-step commands on 3 out of 5 trials (model required x 2).  Cont POC.     Goals:   Multidisciplinary Problems       SLP Goals          Problem: SLP    Goal Priority Disciplines Outcome   SLP Goal     SLP Progressing   Description: Speech Pathology Goals  To be met by 7/19/24    1. Pt will participate in ongoing diagnostic dysphagia  therapy    2. Pt will answer basic Y/N questions with 50% accuracy given MAX multi-modality cueing  3. Pt will follow single step commands paired with model across 50% of trials   4. Pt will complete automatic speech tasks with 50% accuracy given MAX multi-modality cueing         Speech Language Pathology Goals  Updated goals expected to be met by 5/10 (goals remain appropriate 6/11 - reassess on 6/18:  1. Pt will participate in ongoing swallowing assessment to determine if appropriate for PO trials for pleasure.   2. Pt will model single step command x 1 given max cues.   3. Pt will answer simple yes/no q's during a structured receptive language task x 1 given max cues.   4. Pt will phonate purposefully upon command x 1 given max cues.   5. Pt will attempt to vocalize/verbalize during automatic speech task x 1 given max cues.   6. Pt will participate in evaluation of ability to utilize basic communication board.     Goals expected to be met by 5/8:  1. Pt will participate in Modified Barium Swallow Study to determine if safe for oral intake. Goal met/attempted 5/2                               Plan:     Patient to be seen:  3 x/week   Plan of Care expires:  05/31/24  Plan of Care reviewed with:  patient   SLP Follow-Up:  Yes       Discharge recommendations:  Moderate Intensity Therapy     Time Tracking:     SLP Treatment Date:   06/25/24  Speech Start Time:  1240  Speech Stop Time:  1301     Speech Total Time (min):  21 min    Billable Minutes: Speech Therapy Individual 10 and Treatment Swallowing Dysfunction 11    06/25/2024

## 2024-06-25 NOTE — PT/OT/SLP PROGRESS
Physical Therapy Treatment    Patient Name:  Sarah Saravia   MRN:  [id number]    Recommendations:     Discharge Recommendations: Moderate Intensity Therapy  Discharge Equipment Recommendations: hospital bed, lift device, wheelchair  Barriers to discharge: Decreased caregiver support Pt requiring increased skilled assistance at current time.     Assessment:     Sarah Saravia is a 53 y.o. female admitted with a medical diagnosis of Sepsis.  She presents with the following impairments/functional limitations: weakness, impaired endurance, impaired self care skills, impaired functional mobility, gait instability, impaired balance, decreased coordination, decreased upper extremity function, decreased lower extremity function, decreased safety awareness, pain, decreased ROM, impaired coordination, edema requiring max/total assistance and verbal cues for bed mob, scooting to EOB/HOB due to weakness, fatigue, and pain.   In light of pt's current functional level and deficits, it is anticipated that pt will need to participate in a moderate intensity rehab program consisting of PT and OT in order to achieve full rehab potential to return to previous level of function and roles.  Pt remains motivated to participate in PT session and will cont to benefit from skilled PT intervention..    Rehab Prognosis: Fair; patient would benefit from acute skilled PT services to address these deficits and reach maximum level of function.    Recent Surgery: * No surgery found *      Plan:     During this hospitalization, patient to be seen 3 x/week to address the identified rehab impairments via gait training, therapeutic activities, therapeutic exercises, neuromuscular re-education and progress toward the following goals:    Plan of Care Expires:  07/22/24    Subjective     Chief Complaint: weakness, fatigue, and pain  Pain/Comfort:  Pain Rating 1: 0/10  Location - Side 1: Right  Location - Orientation 1: generalized  Location 1: knee  (when attempting standing)  Pain Addressed 1: Distraction, Cessation of Activity  Pain Rating Post-Intervention 1:  (pt unable to rate)      Objective:     Communicated with nurse (Araceli) prior to session.  Patient found HOB elevated with blood pressure cuff, PEG Tube, pulse ox (continuous), telemetry upon PT entry to room.     General Precautions: Standard, aphasia, aspiration, NPO  Orthopedic Precautions: N/A  Braces: N/A  Respiratory Status: Room air     Functional Mobility:  Bed Mobility:     Rolling Left:  total assistance and of 2 persons  Rolling Right: total assistance and of 2 persons  Scooting: anteriorly to the EOB with max A of 2 with use of drawsheet; to HOB dependent via drawsheet  Supine to Sit: max/total A of 2 for trunk elevation and LE's, exiting on the R side, HOB at 20* angle, increased time  Sit to Supine: total A of 2 for trunk and LE's, HOB flat  Transfers:     Sit to Stand:  from EOB attempted with max/total A of 1-2, however, pt unable to perform with c/o pain in R knee   Balance: static sitting at the EOB SBA 12 min      AM-PAC 6 CLICK MOBILITY  Turning over in bed (including adjusting bedclothes, sheets and blankets)?: 2  Sitting down on and standing up from a chair with arms (e.g., wheelchair, bedside commode, etc.): 1  Moving from lying on back to sitting on the side of the bed?: 2  Moving to and from a bed to a chair (including a wheelchair)?: 1  Need to walk in hospital room?: 1  Climbing 3-5 steps with a railing?: 1  Basic Mobility Total Score: 8       Treatment & Education:  Patient provided with daily orientation and goals of this PT session. They were educated to call for assistance and to transfer with hospital staff only.  Also, pt was educated on the effects of prolonged immobility and the importance of performing OOB activity and exercises to promote healing and reduce recovery time    Patient left HOB elevated with all lines intact and call button in reach..    GOALS:    Multidisciplinary Problems       Physical Therapy Goals          Problem: Physical Therapy    Goal Priority Disciplines Outcome Goal Variances Interventions   Physical Therapy Goal     PT, PT/OT Progressing     Description: Goals to be met by: 24   Goals remain appropriate 5/3/2024 to be met by 24  Goals updated 2024 to be met by 24  Goals updated 24 to be met by 24  Goals remain appropriate 24 to be met by 24  Goals remain appropriate to be met by 24    Patient will increase functional independence with mobility by performin. Supine to sit with Maximum Assistance  2. Sit to supine with Maximum Assistance  3. Rolling to Left and Right with Moderate Assistance.  4. Sitting at edge of bed 5 minutes with Moderate Assistance- MET , monitor consistency  4a. Sitting at edge of bed 10 minutes with Supervision  5. Lower extremity exercise program x20 reps per handout, with assistance as needed  6. Sit to stand transfer with Maximum Assistance with or without LRAD  7. Stand for 2 minutes with Maximum Assistance with or without LRAD                         Time Tracking:     PT Received On: 24  PT Start Time: 1450     PT Stop Time: 1516  PT Total Time (min): 26 min     Billable Minutes: Therapeutic Activity 26    Treatment Type: Treatment  PT/PTA: PTA     Number of PTA visits since last PT visit: 2024

## 2024-06-25 NOTE — PT/OT/SLP PROGRESS
Occupational Therapy   Treatment    Name: Sarah Saravia  MRN: [id number]  Admitting Diagnosis:  Sepsis     Tech: Corinne  Recommendations:     Discharge Recommendations: Moderate Intensity Therapy  Discharge Equipment Recommendations:  wheelchair, hospital bed, lift device  Barriers to discharge:  Other (Comment) (requires extensive assist)    Assessment:     Sarah Saravia is a 53 y.o. female with a medical diagnosis of Sepsis.  She presents with good sitting balance EOB on this date . Pt. Continues to require significant assist for all mobility. Pt. With appeared right knee pain. Some engagement noted with ADL tasks. Patient would benefit from continued OT services to maximize level of safety and independence with self-care tasks.   . Performance deficits affecting function are weakness, impaired endurance, impaired self care skills, impaired functional mobility, decreased upper extremity function, decreased lower extremity function, impaired cognition.     Rehab Prognosis:  Fair; patient would benefit from acute skilled OT services to address these deficits and reach maximum level of function.       Plan:     Patient to be seen 2 x/week to address the above listed problems via self-care/home management, therapeutic activities, therapeutic exercises, neuromuscular re-education  Plan of Care Expires: 07/03/24  Plan of Care Reviewed with: patient, significant other    Subjective     Chief Complaint: Pt. aphasic  Patient/Family Comments/goals: no goals stated  Pain/Comfort:  Pain Rating 1:  (did not rate)  Location - Side 1: Right  Location 1: knee  Pain Addressed 1: Reposition, Distraction  Pain Rating Post-Intervention 1:  (did not quantify)    Objective:     Communicated with: nurse prior to session.  Patient found supine with PEG Tube, telemetry, pressure relief boots upon OT entry to room.    General Precautions: Standard, aspiration, aphasia, NPO    Orthopedic Precautions:N/A  Braces: N/A  Respiratory Status:  Room air     Occupational Performance:     Bed Mobility:    Patient completed Rolling/Turning to Left with  total assistance  Patient completed Rolling/Turning to Right with total assistance  Patient completed Scooting/Bridging with total assistance and 2 persons to scoot anteriorly to EOB  Patient completed Supine to Sit with total assistance and 2 persons  Patient completed Sit to Supine with total assistance and 2 persons     Functional Mobility/Transfers:  Pt. Required Max A x 2 to  perform 2 lateral scoots to HOB  Functional Mobility: not tested    Activities of Daily Living:  Grooming: declined  washing face and applying lip balm; Total A to apply deoderant to BUE  Bathing:Total  assistance sponge bath to wash under arms/arms  Upper Body Dressing: moderate assistance to don fresh gown forward sitting EOB      AMPAC 6 Click ADL: 9    Treatment & Education:  Pt. Sat EOB with SBA  Pt. Tapped balloon x 2 trials with RUE back to therapist  Pt. Kicked Balloon with BLE x 5 reps each    Patient left supine with all lines intact, call button in reach, and HOB elevated    GOALS:   Multidisciplinary Problems       Occupational Therapy Goals          Problem: Occupational Therapy    Goal Priority Disciplines Outcome Interventions   Occupational Therapy Goal     OT, PT/OT Progressing    Description: Goals to be met by: 5/22/24 Goals revised as needed on 05-30 to be met by 06-19:Goals revised and extended to 07-03    Patient will increase functional independence with ADLs by performing:    Revised: UBD with Min A NOT MET  New Goal: UBD seated EOB with Mod A  Revised: Grooming seated EOB with CGA NOT MET  New Goal groom seated EOB with Min A  Revised: Sit EOB x 20 minutes with SBA  MET 06-10-24  Rolling to Bilateral with Moderate Assistance.NOT MET 06-19     Supine to sit with Maximum Assistance. NOT MET 06-19                           Time Tracking:     OT Date of Treatment: 06/25/24  OT Start Time: 1029  OT Stop Time:  1102  OT Total Time (min): 33 min    Billable Minutes:Self Care/Home Management 20  Therapeutic Activity 13    OT/JOSÉ: OT     Number of JOSÉ visits since last OT visit: 0    6/25/2024

## 2024-06-25 NOTE — ASSESSMENT & PLAN NOTE
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 Hospitalization was c/b urosepsis, in which pt completed carbapenem course on 4/16. Trach was capped and later pt was decannulated on 4/30 which was successful. Furthermore, pt with documented staph epi on blood cultures, though unclear if contamination as specimens were obtained from different sites, at same time, and sensitivity patterns are different. Later, pt required HD tunneled cath exchange with IR on 4/30 dt it malfunctioning. Pt remained inpatient while awaiting dispo plans, as well as requiring mgmt for lyte imbalances and dysphagia.     ID has followed pt intermittently through stay. Most recently, pt completed two 5d courses of vanc, meropenem on 6/17, which was restarted on 6/18-6/22. Despite antibiotic therapy, pt continued with intermittent spikes of leukocytosis and fevers, and continued once antibiotics were discontinued on 6/22. Work up has been unrevealing thus far. Peripheral blood cultures collected on 6/18 NGTD. Urine with multiple organisms. RIP 6/14 negative. Recent chest xray from 6/22 without consolidation. Bilateral upper and lower ultrasounds without thrombus.     Continues with wavering leukocytosis. See lab report, though intermittent despite antibiotic therapy. Today 14k. Procal 6.8 -> 9.48 -> 6.8 today. Recent UA on 6/22 with pyruia. Culture with multiple organisms. Sample was contaminated with >100 squams. Fungitell pending.    CT of cervical spine with cervical stenosis. Without fluid collections. Noting cervical lymph nodes bilaterally, though not enlarged by size criteria. CT AP noting bilateral atelectasis vs dependent edema. No consolidation. Noting potential hepatic steatosis.      To note, pt with history of ros's and abscess s/p I&D during prior admission,  though no evidence of skin infection during exam today. Gtube site also non concerning.      Primary team concerned of tunneled line catheter of source in terms of thrombus. Interventional nephrology consulted.      ID was consulted for recurrent leukocytosis, low grade fevers. Pt has been off antibiotics since 6/22.       Recommendations:   - fever and leukocytosis pattern appear consistent with dialysis schedule. Unclear if related, though recommend removal of tunneled chest port. Can replace as long as peripheral blood cultures are negative.  - okay to restart empiric antibiotic therapy   - pt seen and plan reviewed with ID staff. ID will follow.

## 2024-06-25 NOTE — PROGRESS NOTES
[facility] - Telemetry Stepdown  Nephrology  Progress Note    Patient Name: Sarah Saravia  MRN: [id number]  Admission Date: 4/10/2024  Hospital Length of Stay: 76 days  Attending Provider: Soco Erickson MD   Primary Care Physician: Deanna, Primary Doctor  Principal Problem:Sepsis    Subjective:     Interval History:   HD completed yesterday with 2 L removed. No distress on exam. TMAX 99.1. Interventional nephrology evaluated CVC with low suspicion for infection. Blood cultures collected.     Review of patient's allergies indicates:  No Known Allergies  Current Facility-Administered Medications   Medication Frequency    [START ON 6/26/2024] 0.9%  NaCl infusion Once    acetaminophen oral solution 650 mg Q6H PRN    ascorbic acid (vitamin C) tablet 500 mg BID    aspirin chewable tablet 81 mg Daily    atorvastatin tablet 40 mg Daily    dextrose 10% bolus 125 mL 125 mL PRN    dextrose 10% bolus 250 mL 250 mL PRN    epoetin merry injection 6,100 Units Every Mon, Wed, Fri    glucagon (human recombinant) injection 1 mg PRN    glucose chewable tablet 16 g PRN    glucose chewable tablet 24 g PRN    heparin (porcine) injection 1,000 Units PRN    heparin (porcine) injection 3,000 Units PRN    heparin (porcine) injection 5,000 Units Q8H    hepatitis B virus vacc.rec(PF) injection 20 mcg vaccine x 1 dose    insulin aspart U-100 pen 0-10 Units QID (AC + HS) PRN    insulin glargine U-100 (Lantus) pen 13 Units QHS    metoprolol tartrate (LOPRESSOR) tablet 25 mg BID    ondansetron 4 mg/5 mL solution 4 mg Q6H PRN    oxyCODONE 5 mg/5 mL solution 5 mg Q4H PRN    pantoprazole suspension 40 mg Daily    polyethylene glycol packet 17 g TID    sodium chloride 0.9% bolus 250 mL 250 mL PRN    sodium chloride 0.9% flush 10 mL Q12H PRN       Objective:     Vital Signs (Most Recent):  Temp: 98.4 °F (36.9 °C) (06/25/24 0728)  Pulse: 96 (06/25/24 0728)  Resp: 19 (06/25/24 0728)  BP: 126/70 (06/25/24 0728)  SpO2: 98 % (06/25/24 0814) Vital Signs (24h  Range):  Temp:  [98.3 °F (36.8 °C)-99.8 °F (37.7 °C)] 98.4 °F (36.9 °C)  Pulse:  [] 96  Resp:  [17-20] 19  SpO2:  [98 %-100 %] 98 %  BP: (100-152)/(70-97) 126/70     Weight: 105.5 kg (232 lb 9.4 oz) (06/19/24 1052)  Body mass index is 36.43 kg/m².  Body surface area is 2.23 meters squared.    I/O last 3 completed shifts:  In: 2124.5 [I.V.:298.5; Other:300; NG/GT:1526]  Out: 2155 [Drains:5; Other:2150]     Physical Exam  Vitals and nursing note reviewed.   Constitutional:       Appearance: Normal appearance. She is obese.   HENT:      Head: Normocephalic and atraumatic.      Right Ear: External ear normal.      Left Ear: External ear normal.      Nose: Nose normal.      Mouth/Throat:      Mouth: Mucous membranes are moist.   Eyes:      Extraocular Movements: Extraocular movements intact.   Neck:      Comments: Midline scar present   Right sided tunneled dialysis line present.   Cardiovascular:      Rate and Rhythm: Normal rate and regular rhythm.      Pulses: Normal pulses.      Heart sounds: Normal heart sounds.   Pulmonary:      Effort: Pulmonary effort is normal. No respiratory distress.      Breath sounds: Normal breath sounds.   Abdominal:      General: Abdomen is flat.      Palpations: Abdomen is soft.   Musculoskeletal:         General: Normal range of motion.      Right lower leg: No edema.      Left lower leg: No edema.   Skin:     General: Skin is warm.      Capillary Refill: Capillary refill takes less than 2 seconds.      Coloration: Skin is not jaundiced.   Neurological:      Mental Status: She is alert.          Significant Labs:  CBC:   Recent Labs   Lab 06/25/24  0431   WBC 13.39*   RBC 4.26   HGB 10.5*   HCT 35.1*      MCV 82   MCH 24.6*   MCHC 29.9*     CMP:   Recent Labs   Lab 06/25/24  0431   *   CALCIUM 10.2   ALBUMIN 3.2*   PROT 8.5*   *   K 4.0   CO2 19*   CL 99   BUN 20   CREATININE 5.2*   ALKPHOS 84   ALT 6*   AST 18   BILITOT 0.4     All labs within the past 24  hours have been reviewed.     Assessment/Plan:     Renal/  ESRD (end stage renal disease) on dialysis  53 y.o. Black or  Female ESRD-HD M-W-F at [facility] presents to ED on 4/10/2024 with UTI.    Of note, the patient was initiated on RRT in February 2024 after being admitted with ALVARADO 2/2 iATN due to septic shock. Perm cath placed on 2/29/24. She was transferred to [facility] on 3/12. Deemed ESRD at [facility].  Nephrology consulted for inpatient ESRD-HD management    Assessment:   - HD completed yesterday. Plans for HD tomorrow.   - Continue to monitor intake and output  - Please avoid gadolinium, fleets, phos-based laxatives, NSAIDs  - Dialysis thrice weekly unless more urgent indications arise. Will evaluate RRT requirements Daily.      Lab Results   Component Value Date    FESATURATED 10 (L) 03/15/2024    FERRITIN 414 (H) 03/15/2024       - Goal in ESRD is Hgb of 10-11. Continue EPO.       Secondary hyperparathyroid of renal origin   - no indication for phos binders       ID  * Sepsis  - Blood cultures pending        Thank you for your consult. I will follow-up with patient. Please contact us if you have any additional questions.    Shwetha Gregory, POONAM, FNP-C  Nephrology  [facility] - Telemetry Stepdown

## 2024-06-25 NOTE — NURSING
Nurses Note -- 4 Eyes      06/24/2024   19:10 PM      Skin assessed during: Q Shift Change      [] No Altered Skin Integrity Present    []Prevention Measures Documented      [x] Yes- Altered Skin Integrity Present or Discovered   [] LDA Added if Not in Epic (Describe Wound)   [] New Altered Skin Integrity was Present on Admit and Documented in LDA   [] Wound Image Taken    Wound Care Consulted? No    Attending Nurse:  FLORENCIO Nguyen RN     Second RN/Staff Member:  ERROL Kenyon RN

## 2024-06-25 NOTE — CLINICAL REVIEW
IP Sepsis Screen (most recent)       Sepsis Screen (IP) - 06/25/24 0822       Is the patient's history or complaint suggestive of a possible infection? Yes  -    Are there at least two of the following signs and symptoms present? Yes   WBC increased today -    Sepsis signs/symptoms - Tachycardia Tachycardia     >90  -    Sepsis signs/symptoms - WBC WBC < 4,000 or WBC > 12,000  -    Are any of the following organ dysfunction criteria present and not considered to be due to a chronic condition? No   ESRD-HD, elevated CRP/procal -    Initiate Sepsis Protocol No   AF, HDS, completed abx course 6/22, ID signed off 6/20, ordered TDC line cultures cancelled? -              User Key  (r) = Recorded By, (t) = Taken By, (c) = Cosigned By      Initials Name    Kathy Velasquez RN

## 2024-06-25 NOTE — SUBJECTIVE & OBJECTIVE
Interval History:   HD completed yesterday with 2 L removed. No distress on exam. TMAX 99.1. Interventional nephrology evaluated CVC with low suspicion for infection. Blood cultures collected.     Review of patient's allergies indicates:  No Known Allergies  Current Facility-Administered Medications   Medication Frequency    [START ON 6/26/2024] 0.9%  NaCl infusion Once    acetaminophen oral solution 650 mg Q6H PRN    ascorbic acid (vitamin C) tablet 500 mg BID    aspirin chewable tablet 81 mg Daily    atorvastatin tablet 40 mg Daily    dextrose 10% bolus 125 mL 125 mL PRN    dextrose 10% bolus 250 mL 250 mL PRN    epoetin merry injection 6,100 Units Every Mon, Wed, Fri    glucagon (human recombinant) injection 1 mg PRN    glucose chewable tablet 16 g PRN    glucose chewable tablet 24 g PRN    heparin (porcine) injection 1,000 Units PRN    heparin (porcine) injection 3,000 Units PRN    heparin (porcine) injection 5,000 Units Q8H    hepatitis B virus vacc.rec(PF) injection 20 mcg vaccine x 1 dose    insulin aspart U-100 pen 0-10 Units QID (AC + HS) PRN    insulin glargine U-100 (Lantus) pen 13 Units QHS    metoprolol tartrate (LOPRESSOR) tablet 25 mg BID    ondansetron 4 mg/5 mL solution 4 mg Q6H PRN    oxyCODONE 5 mg/5 mL solution 5 mg Q4H PRN    pantoprazole suspension 40 mg Daily    polyethylene glycol packet 17 g TID    sodium chloride 0.9% bolus 250 mL 250 mL PRN    sodium chloride 0.9% flush 10 mL Q12H PRN       Objective:     Vital Signs (Most Recent):  Temp: 98.4 °F (36.9 °C) (06/25/24 0728)  Pulse: 96 (06/25/24 0728)  Resp: 19 (06/25/24 0728)  BP: 126/70 (06/25/24 0728)  SpO2: 98 % (06/25/24 0814) Vital Signs (24h Range):  Temp:  [98.3 °F (36.8 °C)-99.8 °F (37.7 °C)] 98.4 °F (36.9 °C)  Pulse:  [] 96  Resp:  [17-20] 19  SpO2:  [98 %-100 %] 98 %  BP: (100-152)/(70-97) 126/70     Weight: 105.5 kg (232 lb 9.4 oz) (06/19/24 1052)  Body mass index is 36.43 kg/m².  Body surface area is 2.23 meters  squared.    I/O last 3 completed shifts:  In: 2124.5 [I.V.:298.5; Other:300; NG/GT:1526]  Out: 2155 [Drains:5; Other:2150]     Physical Exam  Vitals and nursing note reviewed.   Constitutional:       Appearance: Normal appearance. She is obese.   HENT:      Head: Normocephalic and atraumatic.      Right Ear: External ear normal.      Left Ear: External ear normal.      Nose: Nose normal.      Mouth/Throat:      Mouth: Mucous membranes are moist.   Eyes:      Extraocular Movements: Extraocular movements intact.   Neck:      Comments: Midline scar present   Right sided tunneled dialysis line present.   Cardiovascular:      Rate and Rhythm: Normal rate and regular rhythm.      Pulses: Normal pulses.      Heart sounds: Normal heart sounds.   Pulmonary:      Effort: Pulmonary effort is normal. No respiratory distress.      Breath sounds: Normal breath sounds.   Abdominal:      General: Abdomen is flat.      Palpations: Abdomen is soft.   Musculoskeletal:         General: Normal range of motion.      Right lower leg: No edema.      Left lower leg: No edema.   Skin:     General: Skin is warm.      Capillary Refill: Capillary refill takes less than 2 seconds.      Coloration: Skin is not jaundiced.   Neurological:      Mental Status: She is alert.          Significant Labs:  CBC:   Recent Labs   Lab 06/25/24  0431   WBC 13.39*   RBC 4.26   HGB 10.5*   HCT 35.1*      MCV 82   MCH 24.6*   MCHC 29.9*     CMP:   Recent Labs   Lab 06/25/24  0431   *   CALCIUM 10.2   ALBUMIN 3.2*   PROT 8.5*   *   K 4.0   CO2 19*   CL 99   BUN 20   CREATININE 5.2*   ALKPHOS 84   ALT 6*   AST 18   BILITOT 0.4     All labs within the past 24 hours have been reviewed.

## 2024-06-25 NOTE — PROGRESS NOTES
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 History was given from patient's daughter. She was undergoing dialysis today and noticed she was lethargic, less alert than usual self. Pt completed dialysis and still not acting herself. EMS was called, fever of a 100.  On chart review, she did have an episode of large volume emesis around 1700.  Per EMS, she had a slightly elevated temp 100.0°F, glucose 300s.     In the ED: UA 2+ leuks, >100 WBC, many bacteria, WBC 17, CT abd/pelvis concerning for cystitis. Given vanc/zosyn    Overview/Hospital Course:  53 JARROD admitted to Hospital Medicine service for urosepsis. Vanc/zosyn initiated, found to have staph epi in all 4 bottles, vanc/ertapenem course completed per ID. Vascular Neurology consulted concerning mental status change with the recommendation to initiate ASA 81 QD and to obtain an MRI to r/o new stroke. Per discussion with vascular neurology, MRI findings appear to be expected changes from prior stroke. Nephrology was consulted for regularly scheduled dialysis. Pulm consulted, patient decannulated 4/30. Failed swallow assessment, continuing tube feeds. Ongoing placement difficulties d/t need for accepting HD facility to have sandee lift with 2 personnel to operate. Once patient gets accepted at [facility] [place], next step will be to work with daughter on halfway NH placement. SW onboard working on paperwork for long term Medicaid application. H/c c/b new fever, ID reconsulted, CT chest showing bilateral ground glass opacities,  Vanc/meropenem course to be completed 6/17, however patient had further episode of fever and will need continued merem and ID consulted for assistance. CT pan scan to assess for infection. Imaging without signs of infection, patient to complete empiric abx for 5 days. Patient continues fevering despite broad abx, no identified source. Chest x-ray, peripheral smear to assess for other sources.    Interval History: NAEON. Patient without fevers, but now with leukocytosis, exam without focal findings    Review of Systems   Unable to perform ROS: Patient nonverbal     Objective:     Vital Signs (Most Recent):  Temp: 98.3 °F (36.8 °C) (06/25/24 1139)  Pulse: 92 (06/25/24 1225)  Resp: 19 (06/25/24 1139)  BP: 110/67 (06/25/24 1139)  SpO2: 98 % (06/25/24 1300) Vital Signs (24h Range):  Temp:  [98.3 °F (36.8 °C)-99.8 °F (37.7 °C)] 98.3 °F (36.8 °C)  Pulse:  [] 92  Resp:  [17-20] 19  SpO2:  [98 %-100 %] 98 %  BP: (100-126)/(67-85) 110/67     Weight: 105.5 kg (232 lb 9.4 oz)  Body mass index is 36.43 kg/m².    Intake/Output Summary (Last 24 hours) at 6/25/2024 1415  Last data filed at 6/25/2024 0533  Gross per 24 hour   Intake 716.53 ml   Output --   Net 716.53 ml         Physical Exam  Constitutional:       Appearance: Normal appearance.   HENT:      Head: Normocephalic and atraumatic.      Right Ear: External ear normal.      Left Ear: External ear normal.   Cardiovascular:      Rate and Rhythm: Normal rate and regular rhythm.      Pulses: Normal pulses.      Heart sounds: Normal heart sounds.   Pulmonary:      Effort: Pulmonary effort is normal.      Breath sounds: Normal breath sounds.   Abdominal:      Palpations: Abdomen is soft.   Musculoskeletal:         General: Normal range of motion.   Skin:     General: Skin is warm.      Capillary Refill: Capillary refill takes less than 2 seconds.   Neurological:      General: No focal deficit present.             Significant Labs: All pertinent labs within the past 24  hours have been reviewed.  CBC:   Recent Labs   Lab 06/24/24  0758 06/25/24  0431   WBC 7.56 13.39*   HGB 9.0* 10.5*   HCT 31.4* 35.1*    348     CMP:   Recent Labs   Lab 06/24/24  0758 06/25/24  0431   * 135*   K 4.1 4.0   CL 99 99   CO2 19* 19*    122*   BUN 33* 20   CREATININE 7.8* 5.2*   CALCIUM 10.6* 10.2   PROT 7.6 8.5*   ALBUMIN 2.9* 3.2*   BILITOT 0.3 0.4   ALKPHOS 73 84   AST 15 18   ALT 6* 6*   ANIONGAP 17* 17*       Significant Imaging: I have reviewed all pertinent imaging results/findings within the past 24 hours.    Assessment/Plan:      * Sepsis  This patient does have evidence of infective focus. My overall impression is sepsis. Source: Skin and Soft Tissue (location Abdominal). Not requiring pressors. Source control achieved by: vanc/meropenem.  Concerns septic source may be PEG site with associated purulent wound despite Wound Care attempts to protect with barriers. PEG placed by General Surgery 2/2024. General Surgery consulted regarding PEG site ordered CT No evidence of infection  US extremities negative for thrombus   CT pan scan w/ IV contrast without evidence of infection  Interventional nephrology consulted for line eval - low suspicion for clot, getting blood cx to r/o infected line    - No fevers since abx dcd  - tagged WBC scan 6/26, f/u results  - Peripheral smear with findings concerning for infection  - ID reconsulted for further assistance    Cervical stenosis of spinal canal  Outpatient MRI and NSGY f/up    Irritant contact dermatitis due friction or contact with other specified body fluids  Wound care following     Debility  Needs:  Wheelchair: patient has a mobility limitation that significantly impairs her ability to participate in one or more mobility related activities of daily living (MRADL's) such as toileting, feeding, dressing, grooming, and bathing in customary locations in the home.  The mobility limitation cannot be sufficiently resolved by the use of  a cane or walker.   The use of a manual wheelchair will significantly improve the patient's ability to participate in MRADLS and the patient will use it on regular basis in the home.  Family/pt has expressed her willingness to use a manual wheelchair in the home.  She also has a caregiver who is capable of assisting in mobility.    Mrs Saravia requires a hospital bed due to her requiring positioning of the body in ways not feasible with an ordinary bed to alleviate pain/ is completely immobile /or limited mobility and cannot independently make changes in body position without the use of the bed.  The positioning of the body cannot be sufficiently resolved by the use of pillows and wedges    Patient has a mobility limitation that significantly impairs their ability to participate in one or more mobility related activities of daily living, including toileting. This deficit can be resolved by using a bedside commode. Patient demonstrates mobility limitations that will cause them to be confined to one room at home without bathroom access for up to 30 days. Using a bedside commode will greatly improve the patient's ability to participate in MRADLs     Complication of vascular dialysis catheter  Cath intermittently clogging, not resolving with cath-hedy, going for IR venogram 4/30 with possible exchange. S/p exchange with IR on 4/30    Constipation  Stool burden on CT    -continue bowel reg  -monitor for BMs    Moderate malnutrition  Nutrition consulted. Most recent weight and BMI monitored-     Measurements:  Wt Readings from Last 1 Encounters:   06/19/24 105.5 kg (232 lb 9.4 oz)   Body mass index is 36.43 kg/m².    Patient has been screened and assessed by RD.    Malnutrition Type:  Context: acute illness or injury  Level: moderate    Malnutrition Characteristic Summary:  Weight Loss (Malnutrition): 10% in 6 months  Subcutaneous Fat (Malnutrition): mild depletion  Muscle Mass (Malnutrition): mild depletion  Fluid  "Accumulation (Malnutrition): mild    Interventions/Recommendations (treatment strategy):  - TF  - previous history of failed swallow studies    Prolonged QT interval  Limit Qtc prolonging drugs as able    Spastic hemiplegia of right dominant side as late effect of cerebrovascular disease  Important that patient is able to sit in chair for 4h for dialysis placement needs, even if she requires lift/assistance getting into the chair.This patient has Chronic right hemiplegia due to stroke. Physical therapy services has been scheduled. Continue all standard measures for pressure injury prevention and consult wound care for any wounds (chronic or acute).    ESRD (end stage renal disease) on dialysis  Creatine stable for now. BMP reviewed- noted Estimated Creatinine Clearance: 12.9 mL/min (A) (based on SCr of 6.3 mg/dL (H)). according to latest data. Based on current GFR, CKD stage is end stage.  Monitor UOP and serial BMP and adjust therapy as needed. Renally dose meds. Avoid nephrotoxic medications and procedures.    -- HD MWF  -- nephro following  -- Hypophosphatemic on sevelamer  -- SW onboard for placement to [facility] to continue dialysis.    PEG (percutaneous endoscopic gastrostomy) status  On PEG tube.Wound care with PEG dressing changes.   - CT abdomen with PEG in correct location  - Tube feeds as toelrated    Leukocytosis  See "Sepsis"    Type 2 diabetes mellitus with hyperglycemia, with long-term current use of insulin  Adjusting insulin to account for diabetic TF    Patient's FSGs are controlled on current medication regimen.  Last A1c reviewed-   Lab Results   Component Value Date    HGBA1C 6.2 (H) 05/27/2024     Most recent fingerstick glucose reviewed-   Recent Labs   Lab 06/24/24  2225   POCTGLUCOSE 128*       Current correctional scale  Medium  Maintain anti-hyperglycemic dose as follows-   Antihyperglycemics (From admission, onward)      Start     Stop Route Frequency Ordered    06/09/24 2100  insulin " glargine U-100 (Lantus) pen 13 Units         -- SubQ Nightly 06/09/24 0756    06/09/24 1255  insulin aspart U-100 pen 0-10 Units         -- SubQ Before meals & nightly PRN 06/09/24 1155          Hold Oral hypoglycemics while patient is in the hospital.  POCT glucose checks   Continue tube feeds    Hyponatremia  Patient has hyponatremia which is uncontrolled,We will aim to correct the sodium by 4-6mEq in 24 hours. We will monitor sodium Daily. The hyponatremia is due to ESRD. Discussed with nephrology, dialysate bath adjusted to account for and correct hyponatremia.  Recent Labs   Lab 06/25/24  0431   *         Ros's gangrene  Pt experienced severe episode of ros's gangrene in January of 2024. Pt underwent extensive course, currently still healing from this, but no longer has wound vac. Picture in media tabs    - Wound care while inpatient  - NH with wound care orders      VTE Risk Mitigation (From admission, onward)           Ordered     heparin (porcine) injection 1,000 Units  As needed (PRN)         06/21/24 0828     heparin (porcine) injection 1,000 Units  As needed (PRN)         06/10/24 0035     heparin (porcine) injection 5,000 Units  Every 8 hours         05/29/24 0823     heparin (porcine) injection 3,000 Units  As needed (PRN)         04/17/24 0825                    Discharge Planning   ZEKE: 6/26/2024     Code Status: Full Code   Is the patient medically ready for discharge?: No    Reason for patient still in hospital (select all that apply): Patient trending condition  Discharge Plan A: New Nursing Home placement - senior care care facility   Discharge Delays: (!) Payor Issues              Jatin Hutchins MD  Department of Hospital Medicine   [facility]

## 2024-06-25 NOTE — PROGRESS NOTES
[facility]  Infectious Disease  Progress Note    Patient Name: Sarah Saravia  MRN: [id number]  Admission Date: 4/10/2024  Length of Stay: 76 days  Attending Physician: Soco Erickson MD  Primary Care Provider: No, Primary Doctor    Isolation Status: Contact  Assessment/Plan:      Oncology  Leukocytosis    53 year old with prolonged hospitalization, PMH including ros's gangrene (1/24), CVA with deficits (nonverbal), requiring trach/PEG, DM, ESRD on HD, who originally presented to [facility] with cf AMS ( in April). Hospitalization was c/b urosepsis, in which pt completed carbapenem course on 4/16. Trach was capped and later pt was decannulated on 4/30 which was successful. Furthermore, pt with documented staph epi on blood cultures, though unclear if contamination as specimens were obtained from different sites, at same time, and sensitivity patterns are different. Later, pt required HD tunneled cath exchange with IR on 4/30 dt it malfunctioning. Pt remained inpatient while awaiting dispo plans, as well as requiring mgmt for lyte imbalances and dysphagia.     ID has followed pt intermittently through stay. Most recently, pt completed two 5d courses of vanc, meropenem on 6/17, which was restarted on 6/18-6/22. Despite antibiotic therapy, pt continued with intermittent spikes of leukocytosis and fevers, and continued once antibiotics were discontinued on 6/22. Work up has been unrevealing thus far. Peripheral blood cultures collected on 6/18 NGTD. Urine with multiple organisms. RIP 6/14 negative. Recent chest xray from 6/22 without consolidation. Bilateral upper and lower ultrasounds without thrombus.     Continues with wavering leukocytosis. See lab report, though intermittent despite antibiotic therapy. Today 14k. Procal 6.8 -> 9.48 -> 6.8 today. Recent UA on 6/22 with pyruia. Culture with multiple organisms. Sample was contaminated with >100 squams. Fungitell pending.    CT of cervical spine with  cervical stenosis. Without fluid collections. Noting cervical lymph nodes bilaterally, though not enlarged by size criteria. CT AP noting bilateral atelectasis vs dependent edema. No consolidation. Noting potential hepatic steatosis.      To note, pt with history of ros's and abscess s/p I&D during prior admission, though no evidence of skin infection during exam today. Gtube site also non concerning.      Primary team concerned of tunneled line catheter of source in terms of thrombus. Interventional nephrology consulted.      ID was consulted for recurrent leukocytosis, low grade fevers. Pt has been off antibiotics since 6/22.       Recommendations:   - fever and leukocytosis pattern appear consistent with dialysis schedule. Unclear if related, though recommend removal of tunneled chest port. Can replace as long as peripheral blood cultures are negative.  - pt seen and plan reviewed with ID staff. ID will follow.         Thank you for your consult. I will follow-up with patient. Please contact us if you have any additional questions.    Mercedes Longoria NP  Infectious Disease  [facility]    Subjective:     Principal Problem:Sepsis    HPI: 53F with history of CVA now nonverbal with the tracheostomy (decannulated) and PEG, uncontrolled diabetes, Ros's gangrene in January 2024, end-stage renal disease on dialysis who resides at [facility] extended care and was transferred for fever and altered mental status. Reportedly patient was less responsive than her baseline had an episode of emesis, in the ED she had a CT abdomen/pelvis concerning for cystitis as well as a UA (obtained via straight cath) concerning for a UTI. She was initially started on vancomycin and Zosyn later broadened to meropenem. On presentation her labs were notable for leukocytosis of 17, as well as a lactic acidosis of 3.9 that has improved to 2.4 with the administration of 1 L of IV fluids, we are being cautious with fluid  repletion given her end-stage renal disease and oliguric status. ID is now consulted for abrupt increase in WBC to 22 w/ associated fever. Daughter at bedside notes that patient had a few episodes of vomiting 2 days ago after initiation of tube feeds, and feels her breathing pattern is different today. Patient is unable to answer questions. CXR  w/ increased vascular congestion. She has been restarted on broad spectrum abx.   Interval History:     ID re consulted as continues with intermittent fevers, tachycardia, leukocytosis.     Review of Systems   Unable to perform ROS: Patient nonverbal     Objective:     Vital Signs (Most Recent):  Temp: 98.3 °F (36.8 °C) (06/25/24 1139)  Pulse: 100 (06/25/24 1525)  Resp: 19 (06/25/24 1139)  BP: 110/67 (06/25/24 1139)  SpO2: 98 % (06/25/24 1300) Vital Signs (24h Range):  Temp:  [98.3 °F (36.8 °C)-99.8 °F (37.7 °C)] 98.3 °F (36.8 °C)  Pulse:  [] 100  Resp:  [17-20] 19  SpO2:  [98 %-100 %] 98 %  BP: (100-126)/(67-80) 110/67     Weight: 105.5 kg (232 lb 9.4 oz)  Body mass index is 36.43 kg/m².    Estimated Creatinine Clearance: 15.6 mL/min (A) (based on SCr of 5.2 mg/dL (H)).     Physical Exam  Vitals reviewed.   Constitutional:       General: She is not in acute distress.     Appearance: She is not ill-appearing.   HENT:      Head: Normocephalic.      Nose: Nose normal. No congestion.      Mouth/Throat:      Mouth: Mucous membranes are moist. No injury or oral lesions.      Tongue: No lesions.   Eyes:      General:         Right eye: No discharge.         Left eye: No discharge.      Conjunctiva/sclera: Conjunctivae normal.   Cardiovascular:      Rate and Rhythm: Normal rate and regular rhythm.      Pulses: Normal pulses.      Heart sounds: Normal heart sounds. No murmur heard.  Pulmonary:      Effort: Pulmonary effort is normal. No respiratory distress.      Breath sounds: Normal breath sounds. No stridor. No wheezing, rhonchi or rales.   Abdominal:      General: Bowel  sounds are normal. There is no distension.      Palpations: Abdomen is soft.      Tenderness: There is no abdominal tenderness. There is no right CVA tenderness or left CVA tenderness.      Comments: Left PEG site without skin breakdown, drainage, redness, swelling.    Musculoskeletal:      Cervical back: Normal range of motion.      Right lower leg: No edema.      Left lower leg: No edema.   Skin:     Findings: No erythema, lesion or rash.      Comments: With right tunneled line. Without surrounding redness, swelling. Without tenderness to palpation.    Neurological:      Mental Status: She is alert. Mental status is at baseline. She is disoriented.          Significant Labs: All pertinent labs within the past 24 hours have been reviewed.    Significant Imaging: I have reviewed all pertinent imaging results/findings within the past 24 hours.

## 2024-06-25 NOTE — ASSESSMENT & PLAN NOTE
This patient does have evidence of infective focus. My overall impression is sepsis. Source: Skin and Soft Tissue (location Abdominal). Not requiring pressors. Source control achieved by: vanc/meropenem.  Concerns septic source may be PEG site with associated purulent wound despite Wound Care attempts to protect with barriers. PEG placed by General Surgery 2/2024. General Surgery consulted regarding PEG site ordered CT No evidence of infection  US extremities negative for thrombus   CT pan scan w/ IV contrast without evidence of infection  Interventional nephrology consulted for line eval - low suspicion for clot, getting blood cx to r/o infected line    - No fevers since abx dcd  - tagged WBC scan 6/26, f/u results  - Peripheral smear with findings concerning for infection  - ID reconsulted for further assistance

## 2024-06-25 NOTE — ASSESSMENT & PLAN NOTE
Adjusting insulin to account for diabetic TF    Patient's FSGs are controlled on current medication regimen.  Last A1c reviewed-   Lab Results   Component Value Date    HGBA1C 6.2 (H) 05/27/2024     Most recent fingerstick glucose reviewed-   Recent Labs   Lab 06/24/24 2225   POCTGLUCOSE 128*       Current correctional scale  Medium  Maintain anti-hyperglycemic dose as follows-   Antihyperglycemics (From admission, onward)    Start     Stop Route Frequency Ordered    06/09/24 2100  insulin glargine U-100 (Lantus) pen 13 Units         -- SubQ Nightly 06/09/24 0756    06/09/24 1255  insulin aspart U-100 pen 0-10 Units         -- SubQ Before meals & nightly PRN 06/09/24 1155        Hold Oral hypoglycemics while patient is in the hospital.  POCT glucose checks   Continue tube feeds

## 2024-06-26 LAB
1,3 BETA GLUCAN SER-MCNC: 38 PG/ML
ALBUMIN SERPL BCP-MCNC: 3 G/DL (ref 3.5–5.2)
ALP SERPL-CCNC: 77 U/L (ref 55–135)
ALT SERPL W/O P-5'-P-CCNC: 8 U/L (ref 10–44)
ANION GAP SERPL CALC-SCNC: 16 MMOL/L (ref 8–16)
ANION GAP SERPL CALC-SCNC: 16 MMOL/L (ref 8–16)
APTT PPP: 29.7 SEC (ref 21–32)
AST SERPL-CCNC: 17 U/L (ref 10–40)
BACTERIA UR CULT: NORMAL
BACTERIA UR CULT: NORMAL
BASOPHILS # BLD AUTO: 0.08 K/UL (ref 0–0.2)
BASOPHILS NFR BLD: 1 % (ref 0–1.9)
BILIRUB SERPL-MCNC: 0.3 MG/DL (ref 0.1–1)
BUN SERPL-MCNC: 15 MG/DL (ref 6–20)
BUN SERPL-MCNC: 30 MG/DL (ref 6–20)
CALCIUM SERPL-MCNC: 10.1 MG/DL (ref 8.7–10.5)
CALCIUM SERPL-MCNC: 10.5 MG/DL (ref 8.7–10.5)
CHLORIDE SERPL-SCNC: 98 MMOL/L (ref 95–110)
CHLORIDE SERPL-SCNC: 98 MMOL/L (ref 95–110)
CO2 SERPL-SCNC: 18 MMOL/L (ref 23–29)
CO2 SERPL-SCNC: 22 MMOL/L (ref 23–29)
CREAT SERPL-MCNC: 4.5 MG/DL (ref 0.5–1.4)
CREAT SERPL-MCNC: 6.8 MG/DL (ref 0.5–1.4)
DIFFERENTIAL METHOD BLD: ABNORMAL
EOSINOPHIL # BLD AUTO: 0.5 K/UL (ref 0–0.5)
EOSINOPHIL NFR BLD: 5.8 % (ref 0–8)
ERYTHROCYTE [DISTWIDTH] IN BLOOD BY AUTOMATED COUNT: 16.8 % (ref 11.5–14.5)
EST. GFR  (NO RACE VARIABLE): 11.1 ML/MIN/1.73 M^2
EST. GFR  (NO RACE VARIABLE): 6.8 ML/MIN/1.73 M^2
FUNGITELL COMMENTS: NEGATIVE
GLUCOSE SERPL-MCNC: 116 MG/DL (ref 70–110)
GLUCOSE SERPL-MCNC: 161 MG/DL (ref 70–110)
HCT VFR BLD AUTO: 30.6 % (ref 37–48.5)
HGB BLD-MCNC: 9 G/DL (ref 12–16)
IMM GRANULOCYTES # BLD AUTO: 0.04 K/UL (ref 0–0.04)
IMM GRANULOCYTES NFR BLD AUTO: 0.5 % (ref 0–0.5)
INR PPP: 1 (ref 0.8–1.2)
LYMPHOCYTES # BLD AUTO: 2.4 K/UL (ref 1–4.8)
LYMPHOCYTES NFR BLD: 31.6 % (ref 18–48)
MAGNESIUM SERPL-MCNC: 1.9 MG/DL (ref 1.6–2.6)
MAGNESIUM SERPL-MCNC: 2.3 MG/DL (ref 1.6–2.6)
MCH RBC QN AUTO: 23.9 PG (ref 27–31)
MCHC RBC AUTO-ENTMCNC: 29.4 G/DL (ref 32–36)
MCV RBC AUTO: 81 FL (ref 82–98)
MONOCYTES # BLD AUTO: 1.1 K/UL (ref 0.3–1)
MONOCYTES NFR BLD: 14.1 % (ref 4–15)
NEUTROPHILS # BLD AUTO: 3.6 K/UL (ref 1.8–7.7)
NEUTROPHILS NFR BLD: 47 % (ref 38–73)
NRBC BLD-RTO: 0 /100 WBC
PHOSPHATE SERPL-MCNC: 4.6 MG/DL (ref 2.7–4.5)
PLATELET # BLD AUTO: 340 K/UL (ref 150–450)
PMV BLD AUTO: 9.9 FL (ref 9.2–12.9)
POCT GLUCOSE: 114 MG/DL (ref 70–110)
POCT GLUCOSE: 163 MG/DL (ref 70–110)
POCT GLUCOSE: 176 MG/DL (ref 70–110)
POCT GLUCOSE: 184 MG/DL (ref 70–110)
POTASSIUM SERPL-SCNC: 4.2 MMOL/L (ref 3.5–5.1)
POTASSIUM SERPL-SCNC: 4.3 MMOL/L (ref 3.5–5.1)
PROT SERPL-MCNC: 7.8 G/DL (ref 6–8.4)
PROTHROMBIN TIME: 11.2 SEC (ref 9–12.5)
RBC # BLD AUTO: 3.77 M/UL (ref 4–5.4)
SODIUM SERPL-SCNC: 132 MMOL/L (ref 136–145)
SODIUM SERPL-SCNC: 136 MMOL/L (ref 136–145)
WBC # BLD AUTO: 7.72 K/UL (ref 3.9–12.7)

## 2024-06-26 PROCEDURE — 63600175 PHARM REV CODE 636 W HCPCS

## 2024-06-26 PROCEDURE — 83735 ASSAY OF MAGNESIUM: CPT

## 2024-06-26 PROCEDURE — 80100014 HC HEMODIALYSIS 1:1

## 2024-06-26 PROCEDURE — 63600175 PHARM REV CODE 636 W HCPCS: Performed by: NURSE PRACTITIONER

## 2024-06-26 PROCEDURE — 90935 HEMODIALYSIS ONE EVALUATION: CPT | Mod: ,,, | Performed by: NURSE PRACTITIONER

## 2024-06-26 PROCEDURE — 80053 COMPREHEN METABOLIC PANEL: CPT

## 2024-06-26 PROCEDURE — 25000003 PHARM REV CODE 250: Performed by: STUDENT IN AN ORGANIZED HEALTH CARE EDUCATION/TRAINING PROGRAM

## 2024-06-26 PROCEDURE — 20600001 HC STEP DOWN PRIVATE ROOM

## 2024-06-26 PROCEDURE — 85610 PROTHROMBIN TIME: CPT

## 2024-06-26 PROCEDURE — 84100 ASSAY OF PHOSPHORUS: CPT

## 2024-06-26 PROCEDURE — 25000003 PHARM REV CODE 250

## 2024-06-26 PROCEDURE — 93010 ELECTROCARDIOGRAM REPORT: CPT | Mod: ,,, | Performed by: INTERNAL MEDICINE

## 2024-06-26 PROCEDURE — 80048 BASIC METABOLIC PNL TOTAL CA: CPT | Mod: XB | Performed by: STUDENT IN AN ORGANIZED HEALTH CARE EDUCATION/TRAINING PROGRAM

## 2024-06-26 PROCEDURE — 36415 COLL VENOUS BLD VENIPUNCTURE: CPT

## 2024-06-26 PROCEDURE — 27000207 HC ISOLATION

## 2024-06-26 PROCEDURE — 97530 THERAPEUTIC ACTIVITIES: CPT | Mod: CQ

## 2024-06-26 PROCEDURE — 93005 ELECTROCARDIOGRAM TRACING: CPT

## 2024-06-26 PROCEDURE — 85025 COMPLETE CBC W/AUTO DIFF WBC: CPT

## 2024-06-26 PROCEDURE — 25000003 PHARM REV CODE 250: Performed by: NURSE PRACTITIONER

## 2024-06-26 PROCEDURE — 85730 THROMBOPLASTIN TIME PARTIAL: CPT

## 2024-06-26 PROCEDURE — 83735 ASSAY OF MAGNESIUM: CPT | Mod: 91 | Performed by: STUDENT IN AN ORGANIZED HEALTH CARE EDUCATION/TRAINING PROGRAM

## 2024-06-26 RX ORDER — SODIUM CHLORIDE 9 MG/ML
INJECTION, SOLUTION INTRAVENOUS CONTINUOUS
Status: CANCELLED | OUTPATIENT
Start: 2024-06-26 | End: 2024-06-26

## 2024-06-26 RX ORDER — SODIUM CHLORIDE 0.9 % (FLUSH) 0.9 %
10 SYRINGE (ML) INJECTION
Status: DISCONTINUED | OUTPATIENT
Start: 2024-06-26 | End: 2024-07-08 | Stop reason: HOSPADM

## 2024-06-26 RX ADMIN — POLYETHYLENE GLYCOL 3350 17 G: 17 POWDER, FOR SOLUTION ORAL at 08:06

## 2024-06-26 RX ADMIN — HEPARIN SODIUM 5000 UNITS: 5000 INJECTION INTRAVENOUS; SUBCUTANEOUS at 05:06

## 2024-06-26 RX ADMIN — ATORVASTATIN CALCIUM 40 MG: 40 TABLET, FILM COATED ORAL at 08:06

## 2024-06-26 RX ADMIN — HEPARIN SODIUM 3000 UNITS: 1000 INJECTION, SOLUTION INTRAVENOUS; SUBCUTANEOUS at 08:06

## 2024-06-26 RX ADMIN — POLYETHYLENE GLYCOL 3350 17 G: 17 POWDER, FOR SOLUTION ORAL at 02:06

## 2024-06-26 RX ADMIN — PANTOPRAZOLE SODIUM 40 MG: 40 GRANULE, DELAYED RELEASE ORAL at 08:06

## 2024-06-26 RX ADMIN — ASPIRIN 81 MG CHEWABLE TABLET 81 MG: 81 TABLET CHEWABLE at 08:06

## 2024-06-26 RX ADMIN — METOPROLOL TARTRATE 25 MG: 25 TABLET, FILM COATED ORAL at 08:06

## 2024-06-26 RX ADMIN — Medication 500 MG: at 08:06

## 2024-06-26 RX ADMIN — HEPARIN SODIUM 1000 UNITS: 1000 INJECTION, SOLUTION INTRAVENOUS; SUBCUTANEOUS at 12:06

## 2024-06-26 RX ADMIN — SODIUM CHLORIDE: 9 INJECTION, SOLUTION INTRAVENOUS at 08:06

## 2024-06-26 RX ADMIN — INSULIN GLARGINE 13 UNITS: 100 INJECTION, SOLUTION SUBCUTANEOUS at 08:06

## 2024-06-26 RX ADMIN — INSULIN ASPART 1 UNITS: 100 INJECTION, SOLUTION INTRAVENOUS; SUBCUTANEOUS at 08:06

## 2024-06-26 RX ADMIN — HEPARIN SODIUM 5000 UNITS: 5000 INJECTION INTRAVENOUS; SUBCUTANEOUS at 02:06

## 2024-06-26 RX ADMIN — ERYTHROPOIETIN 6100 UNITS: 10000 INJECTION, SOLUTION INTRAVENOUS; SUBCUTANEOUS at 10:06

## 2024-06-26 NOTE — SUBJECTIVE & OBJECTIVE
Interval History: no overnight events, will plan for removal of TDC tomorrow.     Review of patient's allergies indicates:  No Known Allergies  Current Facility-Administered Medications   Medication Frequency    acetaminophen oral solution 650 mg Q6H PRN    ascorbic acid (vitamin C) tablet 500 mg BID    aspirin chewable tablet 81 mg Daily    atorvastatin tablet 40 mg Daily    dextrose 10% bolus 125 mL 125 mL PRN    dextrose 10% bolus 250 mL 250 mL PRN    epoetin merry injection 6,100 Units Every Mon, Wed, Fri    glucagon (human recombinant) injection 1 mg PRN    glucose chewable tablet 16 g PRN    glucose chewable tablet 24 g PRN    heparin (porcine) injection 1,000 Units PRN    heparin (porcine) injection 3,000 Units PRN    heparin (porcine) injection 5,000 Units Q8H    hepatitis B virus vacc.rec(PF) injection 20 mcg vaccine x 1 dose    insulin aspart U-100 pen 0-10 Units QID (AC + HS) PRN    insulin glargine U-100 (Lantus) pen 13 Units QHS    metoprolol tartrate (LOPRESSOR) tablet 25 mg BID    ondansetron 4 mg/5 mL solution 4 mg Q6H PRN    oxyCODONE 5 mg/5 mL solution 5 mg Q4H PRN    pantoprazole suspension 40 mg Daily    polyethylene glycol packet 17 g TID    sodium chloride 0.9% bolus 250 mL 250 mL PRN    sodium chloride 0.9% flush 10 mL Q12H PRN    sodium chloride 0.9% flush 10 mL PRN       Objective:     Vital Signs (Most Recent):  Temp: 97.9 °F (36.6 °C) (06/26/24 1215)  Pulse: 109 (06/26/24 1215)  Resp: 18 (06/26/24 1215)  BP: 115/62 (06/26/24 1215)  SpO2: 100 % (06/26/24 1300) Vital Signs (24h Range):  Temp:  [97.6 °F (36.4 °C)-98.7 °F (37.1 °C)] 97.9 °F (36.6 °C)  Pulse:  [] 109  Resp:  [16-19] 18  SpO2:  [93 %-100 %] 100 %  BP: ()/(57-86) 115/62     Weight: 105.5 kg (232 lb 9.4 oz) (06/26/24 1000)  Body mass index is 36.43 kg/m².  Body surface area is 2.23 meters squared.    I/O last 3 completed shifts:  In: 1018 [NG/GT:1018]  Out: 30 [Urine:30]     Physical Exam  Constitutional:       General:  She is not in acute distress.     Appearance: Normal appearance. She is not ill-appearing or toxic-appearing.   HENT:      Head: Normocephalic and atraumatic.      Right Ear: External ear normal.      Left Ear: External ear normal.      Mouth/Throat:      Mouth: Mucous membranes are moist.   Eyes:      Extraocular Movements: Extraocular movements intact.   Cardiovascular:      Rate and Rhythm: Normal rate and regular rhythm.      Pulses: Normal pulses.      Heart sounds: Normal heart sounds.   Pulmonary:      Effort: Pulmonary effort is normal. No respiratory distress.      Breath sounds: Normal breath sounds. No wheezing or rales.   Chest:      Chest wall: No tenderness.   Abdominal:      General: Abdomen is flat. There is no distension.      Palpations: Abdomen is soft.      Tenderness: There is no abdominal tenderness. There is no right CVA tenderness, left CVA tenderness or guarding.   Musculoskeletal:         General: No swelling. Normal range of motion.      Cervical back: Normal range of motion.      Right lower leg: No edema.      Left lower leg: No edema.   Skin:     General: Skin is warm.   Neurological:      Mental Status: She is alert.          Significant Labs:  CBC:   Recent Labs   Lab 06/26/24  0431   WBC 7.72   RBC 3.77*   HGB 9.0*   HCT 30.6*      MCV 81*   MCH 23.9*   MCHC 29.4*     CMP:   Recent Labs   Lab 06/26/24  0431   *   CALCIUM 10.5   ALBUMIN 3.0*   PROT 7.8      K 4.3   CO2 22*   CL 98   BUN 30*   CREATININE 6.8*   ALKPHOS 77   ALT 8*   AST 17   BILITOT 0.3     All labs within the past 24 hours have been reviewed.

## 2024-06-26 NOTE — PROGRESS NOTES
[facility] STAFF HEMODIALYSIS NOTE     Patient currently on hemodialysis for removal of uremic toxins and volume.     Patient seen and evaluated on hemodialysis, tolerating treatment, see HD flowsheet for vitals and assessments.     Labs have been reviewed and the dialysate bath has been adjusted.        Assessment/Plan:     -Patient seen on HD, tolerating treatment well, w/o complaints   -Per nursing staff, Cathflo prior to HD, TDC continues to be sluggish, able to run HD tx, however with low BFR/DFR. Will discuss with interventional nephrology.   -UF goal of 2 L as patient tolerates  -Renal diet, if not NPO   -Strict I/O's and daily weights  -Daily renal function panels  -Keep MAP >65 while on HD   -Hgb goal 10-11, hgb 9.0, continue EPO  -Trend phos, no indication for binders at this time  -CoCa 11.3, will adjust bath. Check PTH.   -Will continue to follow while inpatient     Delfina Shi NP   Nephrology

## 2024-06-26 NOTE — ASSESSMENT & PLAN NOTE
Creatine stable for now. BMP reviewed- noted Estimated Creatinine Clearance: 12 mL/min (A) (based on SCr of 6.8 mg/dL (H)). according to latest data. Based on current GFR, CKD stage is end stage.  Monitor UOP and serial BMP and adjust therapy as needed. Renally dose meds. Avoid nephrotoxic medications and procedures.    -- HD MWF  -- nephro following  -- Hypophosphatemic on sevelamer  -- SW onboard for placement to [facility] to continue dialysis.

## 2024-06-26 NOTE — PT/OT/SLP PROGRESS
Speech Language Pathology      Sarah DOE Manjit  MRN: [id number]    Patient not seen today secondary to dialysis (SLP unable to re-attempt at later time on this date). Will follow-up according to POC.

## 2024-06-26 NOTE — RESPIRATORY THERAPY
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    24 Hours Vitals Range:  Temp:  [97.6 °F (36.4 °C)-98.7 °F (37.1 °C)]   Pulse:  []   Resp:  [16-20]   BP: ()/(57-86)   SpO2:  [93 %-100 %]     Labs:    Recent Labs     248 24  0431 24  0431   * 135* 136   K 4.1 4.0 4.3   CL 99 99 98   CO2 19* 19* 22*   BUN 33* 20 30*   CREATININE 7.8* 5.2* 6.8*    122* 116*   PHOS 4.7* 3.4 4.6*   MG 2.4 2.2 2.3        No results for input(s): "PH", "PCO2", "PO2", "HCO3", "POCSATURATED", "BE" in the last 72 hours.    ASSESSMENT/INTERVENTIONS  Upon arrival, pt on room air with O2 sats of 97%. Pt had no signs/symptoms of resp distress or SOB. No resp interventions indicated at this time.     Last Vitals: Temp: 97.8 °F (36.6 °C) (1537)  Pulse: 124 (1537)  Resp: 20 (1537)  BP: 113/58 (1537)  SpO2: 98 % (1537)  Level of Consciousness: Level of Consciousness (AVPU): alert  Respiratory Effort: Respiratory Effort: Unlabored, Normal  Expansion/Accessory Muscle Usage: Expansion/Accessory Muscles/Retractions: expansion symmetric  All Lung Field Breath Sounds: All Lung Fields Breath Sounds: Anterior:, diminished  JOSE Breath Sounds: diminished  LLL Breath Sounds: diminished  RUL Breath Sounds: diminished  RML Breath Sounds: diminished  RLL Breath Sounds: diminished  O2 Device/Concentration: room air  NIPPV: No Surgical airway: No Vent: No  ETCO2 monitored: ETCO2 (mmHg): 36 mmHg  Ambu at bedside:      Active Orders "   Respiratory Care    Pulse Oximetry Continuous     Frequency: Continuous     Number of Occurrences: Until Specified       RECOMMENDATIONS  ?  We recommend: RRT Recs: Continue POC per primary team.    ESCALATION      No interventions at this time    FOLLOW-UP    Disposition: Remain in room 8092.    Please call back the Rapid Response RT, Cait Mcarthur, RRT at x [phone] for any questions or concerns.

## 2024-06-26 NOTE — NURSING
Hd complete.1.5L  removed. RIJ CVC dressing changed and heparin locked. Difficulty with BFR with catheter. Team aware and pt  scheduled for cath  exchange.  Report given to Beth BARBER.  Pt  awaiting transport to room

## 2024-06-26 NOTE — ASSESSMENT & PLAN NOTE
Adjusting insulin to account for diabetic TF    Patient's FSGs are controlled on current medication regimen.  Last A1c reviewed-   Lab Results   Component Value Date    HGBA1C 6.2 (H) 05/27/2024     Most recent fingerstick glucose reviewed-   Recent Labs   Lab 06/25/24 2112 06/26/24  1205   POCTGLUCOSE 148* 114*       Current correctional scale  Medium  Maintain anti-hyperglycemic dose as follows-   Antihyperglycemics (From admission, onward)    Start     Stop Route Frequency Ordered    06/09/24 2100  insulin glargine U-100 (Lantus) pen 13 Units         -- SubQ Nightly 06/09/24 0756    06/09/24 1255  insulin aspart U-100 pen 0-10 Units         -- SubQ Before meals & nightly PRN 06/09/24 1155        Hold Oral hypoglycemics while patient is in the hospital.  POCT glucose checks   Continue tube feeds

## 2024-06-26 NOTE — ASSESSMENT & PLAN NOTE
-Will plan for removal of tunneled dialysis catheter in the morning  -Please keep NPO after midnight  -Sending PT/aPTT now  -Hold heparin after midnight.

## 2024-06-26 NOTE — PROGRESS NOTES
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 History was given from patient's daughter. She was undergoing dialysis today and noticed she was lethargic, less alert than usual self. Pt completed dialysis and still not acting herself. EMS was called, fever of a 100.  On chart review, she did have an episode of large volume emesis around 1700.  Per EMS, she had a slightly elevated temp 100.0°F, glucose 300s.     In the ED: UA 2+ leuks, >100 WBC, many bacteria, WBC 17, CT abd/pelvis concerning for cystitis. Given vanc/zosyn    Overview/Hospital Course:  53 JARROD admitted to Hospital Medicine service for urosepsis. Vanc/zosyn initiated, found to have staph epi in all 4 bottles, vanc/ertapenem course completed per ID. Vascular Neurology consulted concerning mental status change with the recommendation to initiate ASA 81 QD and to obtain an MRI to r/o new stroke. Per discussion with vascular neurology, MRI findings appear to be expected changes from prior stroke. Nephrology was consulted for regularly scheduled dialysis. Pulm consulted, patient decannulated 4/30. Failed swallow assessment, continuing tube feeds. Ongoing placement difficulties d/t need for accepting HD facility to have sandee lift with 2 personnel to operate. Once patient gets accepted at [facility], next step will be to work with daughter on alf NH placement. SW onboard working on paperwork for long term Medicaid application. H/c c/b new fever, ID reconsulted, CT chest showing bilateral ground glass opacities,  Vanc/meropenem course to be completed 6/17, however patient had further episode of fever and will need continued merem and ID consulted for assistance. CT pan scan to assess for infection. Imaging without signs of infection, patient to complete empiric abx for 5 days. Patient continues fevering despite broad abx, no identified source. Chest x-ray, peripheral smear to assess for other sources. Infectious disease concerned fevers correlating with dialysis sessions, recommending removal of tunneled cath, Bcx monitoring, and subsequent replacement of cath.    Interval History: NAEO, VSS Infectious disease concerned fevers correlating with dialysis sessions, recommending removal of tunneled cath, Bcx monitoring, and subsequent replacement of cath. WBC scan discontinued    Review of Systems   Unable to perform ROS: Patient nonverbal     Objective:     Vital Signs (Most Recent):  Temp: 97.9 °F (36.6 °C) (06/26/24 1215)  Pulse: 109 (06/26/24 1215)  Resp: 18 (06/26/24 1215)  BP: 115/62 (06/26/24 1215)  SpO2: 100 % (06/26/24 0728) Vital Signs (24h Range):  Temp:  [97.6 °F (36.4 °C)-98.7 °F (37.1 °C)] 97.9 °F (36.6 °C)  Pulse:  [] 109  Resp:  [16-19] 18  SpO2:  [93 %-100 %] 100 %  BP: ()/(57-86) 115/62     Weight: 105.5 kg (232 lb 9.4 oz)  Body mass index is 36.43 kg/m².    Intake/Output Summary (Last 24 hours) at 6/26/2024 1241  Last data filed at 6/26/2024 1227  Gross per 24 hour   Intake 1050.22 ml   Output 2130 ml   Net -1079.78 ml         Physical Exam  Vitals reviewed.   Constitutional:       General: She is not in acute distress.     Appearance: She is not ill-appearing.   HENT:      Head: Normocephalic.      Nose: Nose normal. No congestion.      Mouth/Throat:      Mouth: Mucous membranes are moist. No injury or oral lesions.      Tongue: No lesions.   Eyes:      General:         Right eye: No discharge.         Left eye: No discharge.      Conjunctiva/sclera: Conjunctivae normal.   Cardiovascular:       Rate and Rhythm: Normal rate and regular rhythm.      Pulses: Normal pulses.      Heart sounds: Normal heart sounds. No murmur heard.  Pulmonary:      Effort: Pulmonary effort is normal. No respiratory distress.      Breath sounds: Normal breath sounds. No stridor. No wheezing, rhonchi or rales.   Abdominal:      General: Bowel sounds are normal. There is no distension.      Palpations: Abdomen is soft.      Tenderness: There is no abdominal tenderness. There is no right CVA tenderness or left CVA tenderness.      Comments: Left PEG site without skin breakdown, drainage, redness, swelling.    Musculoskeletal:      Cervical back: Normal range of motion.      Right lower leg: No edema.      Left lower leg: No edema.   Skin:     Findings: No erythema, lesion or rash.      Comments: With right tunneled line. Without surrounding redness, swelling. Without tenderness to palpation.    Neurological:      Mental Status: She is alert. Mental status is at baseline.             Significant Labs: All pertinent labs within the past 24 hours have been reviewed.    Significant Imaging: I have reviewed all pertinent imaging results/findings within the past 24 hours.    Assessment/Plan:      * Sepsis  This patient does have evidence of infective focus. My overall impression is sepsis. Source: Skin and Soft Tissue (location Abdominal). Not requiring pressors. Source control achieved by: vanc/meropenem.  Concerns septic source may be PEG site with associated purulent wound despite Wound Care attempts to protect with barriers. PEG placed by General Surgery 2/2024. General Surgery consulted regarding PEG site ordered CT No evidence of infection  US extremities negative for thrombus   CT pan scan w/ IV contrast without evidence of infection  Interventional nephrology consulted for line eval - low suspicion for clot, getting blood cx to r/o infected line  Peripheral smear with findings concerning for infection    No fevers since abx  dcd  WBC tagged scan canceled for time being    - Id consulted, recommending removal of tunneled catheter, blood cx clearance, and replacement    Cervical stenosis of spinal canal  Outpatient MRI and NSGY f/up    Irritant contact dermatitis due friction or contact with other specified body fluids  Wound care following     Debility  Needs:  Wheelchair: patient has a mobility limitation that significantly impairs her ability to participate in one or more mobility related activities of daily living (MRADL's) such as toileting, feeding, dressing, grooming, and bathing in customary locations in the home.  The mobility limitation cannot be sufficiently resolved by the use of a cane or walker.   The use of a manual wheelchair will significantly improve the patient's ability to participate in MRADLS and the patient will use it on regular basis in the home.  Family/pt has expressed her willingness to use a manual wheelchair in the home.  She also has a caregiver who is capable of assisting in mobility.    Mrs Saravia requires a hospital bed due to her requiring positioning of the body in ways not feasible with an ordinary bed to alleviate pain/ is completely immobile /or limited mobility and cannot independently make changes in body position without the use of the bed.  The positioning of the body cannot be sufficiently resolved by the use of pillows and wedges    Patient has a mobility limitation that significantly impairs their ability to participate in one or more mobility related activities of daily living, including toileting. This deficit can be resolved by using a bedside commode. Patient demonstrates mobility limitations that will cause them to be confined to one room at home without bathroom access for up to 30 days. Using a bedside commode will greatly improve the patient's ability to participate in MRADLs     Complication of vascular dialysis catheter  Cath intermittently clogging, not resolving with cath-hedy,  going for IR venogram 4/30 with possible exchange. S/p exchange with IR on 4/30    Constipation  Stool burden on CT    -continue bowel reg  -monitor for BMs    Moderate malnutrition  Nutrition consulted. Most recent weight and BMI monitored-     Measurements:  Wt Readings from Last 1 Encounters:   06/26/24 105.5 kg (232 lb 9.4 oz)   Body mass index is 36.43 kg/m².    Patient has been screened and assessed by RD.    Malnutrition Type:  Context: acute illness or injury  Level: moderate    Malnutrition Characteristic Summary:  Weight Loss (Malnutrition): 10% in 6 months  Subcutaneous Fat (Malnutrition): mild depletion  Muscle Mass (Malnutrition): mild depletion  Fluid Accumulation (Malnutrition): mild    Interventions/Recommendations (treatment strategy):  - TF  - previous history of failed swallow studies    Prolonged QT interval  Limit Qtc prolonging drugs as able    Spastic hemiplegia of right dominant side as late effect of cerebrovascular disease  Important that patient is able to sit in chair for 4h for dialysis placement needs, even if she requires lift/assistance getting into the chair.This patient has Chronic right hemiplegia due to stroke. Physical therapy services has been scheduled. Continue all standard measures for pressure injury prevention and consult wound care for any wounds (chronic or acute).    ESRD (end stage renal disease) on dialysis  Creatine stable for now. BMP reviewed- noted Estimated Creatinine Clearance: 12 mL/min (A) (based on SCr of 6.8 mg/dL (H)). according to latest data. Based on current GFR, CKD stage is end stage.  Monitor UOP and serial BMP and adjust therapy as needed. Renally dose meds. Avoid nephrotoxic medications and procedures.    -- HD MWF  -- nephro following  -- Hypophosphatemic on sevelamer  -- SW onboard for placement to [facility] to continue dialysis.    PEG (percutaneous endoscopic gastrostomy) status  On PEG tube.Wound care with PEG dressing changes.     - CT  "abdomen with PEG in correct location  - Tube feeds as toelrated    Leukocytosis  See "Sepsis"    Type 2 diabetes mellitus with hyperglycemia, with long-term current use of insulin  Adjusting insulin to account for diabetic TF    Patient's FSGs are controlled on current medication regimen.  Last A1c reviewed-   Lab Results   Component Value Date    HGBA1C 6.2 (H) 05/27/2024     Most recent fingerstick glucose reviewed-   Recent Labs   Lab 06/25/24  2112 06/26/24  1205   POCTGLUCOSE 148* 114*       Current correctional scale  Medium  Maintain anti-hyperglycemic dose as follows-   Antihyperglycemics (From admission, onward)      Start     Stop Route Frequency Ordered    06/09/24 2100  insulin glargine U-100 (Lantus) pen 13 Units         -- SubQ Nightly 06/09/24 0756    06/09/24 1255  insulin aspart U-100 pen 0-10 Units         -- SubQ Before meals & nightly PRN 06/09/24 1155          Hold Oral hypoglycemics while patient is in the hospital.  POCT glucose checks   Continue tube feeds    Hyponatremia  Patient has hyponatremia which is uncontrolled,We will aim to correct the sodium by 4-6mEq in 24 hours. We will monitor sodium Daily. The hyponatremia is due to ESRD. Discussed with nephrology, dialysate bath adjusted to account for and correct hyponatremia.  Recent Labs   Lab 06/26/24  0431            Ros's gangrene  Pt experienced severe episode of ros's gangrene in January of 2024. Pt underwent extensive course, currently still healing from this, but no longer has wound vac. Picture in media tabs    - Wound care while inpatient  - NH with wound care orders      VTE Risk Mitigation (From admission, onward)           Ordered     heparin (porcine) injection 1,000 Units  As needed (PRN)         06/21/24 0828     heparin (porcine) injection 1,000 Units  As needed (PRN)         06/10/24 0035     heparin (porcine) injection 5,000 Units  Every 8 hours         05/29/24 0823     heparin (porcine) injection 3,000 " Units  As needed (PRN)         04/17/24 0825                    Discharge Planning   ZEKE: 6/27/2024     Code Status: Full Code   Is the patient medically ready for discharge?: No    Reason for patient still in hospital (select all that apply): Patient trending condition  Discharge Plan A: New Nursing Home placement - detention care facility   Discharge Delays: (!) Payor Issues            Lucien Driver MD  Department of Hospital Medicine   [facility] - Telemetry Stepdown

## 2024-06-26 NOTE — ASSESSMENT & PLAN NOTE
Nutrition consulted. Most recent weight and BMI monitored-     Measurements:  Wt Readings from Last 1 Encounters:   06/26/24 105.5 kg (232 lb 9.4 oz)   Body mass index is 36.43 kg/m².    Patient has been screened and assessed by RD.    Malnutrition Type:  Context: acute illness or injury  Level: moderate    Malnutrition Characteristic Summary:  Weight Loss (Malnutrition): 10% in 6 months  Subcutaneous Fat (Malnutrition): mild depletion  Muscle Mass (Malnutrition): mild depletion  Fluid Accumulation (Malnutrition): mild    Interventions/Recommendations (treatment strategy):  - TF  - previous history of failed swallow studies

## 2024-06-26 NOTE — PLAN OF CARE
Problem: Infection  Goal: Absence of Infection Signs and Symptoms  Outcome: Progressing     Problem: Skin Injury Risk Increased  Goal: Skin Health and Integrity  Outcome: Progressing     Problem: Adult Inpatient Plan of Care  Goal: Plan of Care Review  Outcome: Progressing     Problem: Chronic Kidney Disease  Goal: Optimal Functional Ability  Outcome: Progressing   Pt rested well. VS WNL. No ss of pain or distress. Call light within reach. Bed in low position. PEG flushed as ordered. Will continue POC.

## 2024-06-26 NOTE — PROGRESS NOTES
[facility] - Telemetry Stepdown  Adult Nutrition  Progress Note    SUMMARY       Recommendations    Modify TF formula to Glucerna 1.5. As tolerated, increase to goal rate of 50 mL/hr = 1800 kcals, 99 g of protein, 911 mL fluid.   If Renal formula warranted, rec'd Novasource @ 40 mL/hr = 1920 kcals, 87 g of protein, 688 mL fluid.   RD to monitor & follow-up.    Goals: Meet % EEN, EPN by RD f/u date  Nutrition Goal Status: goal not met  Communication of RD Recs: reviewed with RN    Assessment and Plan    Moderate malnutrition    Malnutrition Type:  Context: acute illness or injury  Level: moderate    Related to (etiology):   Inability to consume sufficient energy     Signs and Symptoms (as evidenced by):   Weight loss, NFPE, Edema    Malnutrition Characteristic Summary:  Weight Loss (Malnutrition): 10% in 6 months  Subcutaneous Fat (Malnutrition): mild depletion  Muscle Mass (Malnutrition): mild depletion  Fluid Accumulation (Malnutrition): mild    Interventions/Recommendations (treatment strategy):  Collaboration of nutrition care w/ other providers  EN    Nutrition Diagnosis Status:   Continues     Malnutrition Assessment    Malnutrition Context: acute illness or injury  Malnutrition Level: moderate    Weight Loss (Malnutrition): 10% in 6 months  Subcutaneous Fat (Malnutrition): mild depletion  Muscle Mass (Malnutrition): mild depletion  Fluid Accumulation (Malnutrition): mild     Reason for Assessment    Reason For Assessment: RD follow-up  Diagnosis: other (see comments) (Acute cystitis with hematuria)  Relevant Medical History: CVA, PEG, DM  Interdisciplinary Rounds: did not attend    General Information Comments: URIEL in HD. Per RN, pt tolerating TFs via PEG; remains NPO (per SLP). Moderate malnutrition diagnosis continues - please see PES statement for details.   Nutrition Discharge Planning: Adequate nutrition    Nutrition/Diet History    Spiritual, Cultural Beliefs, Worship Practices, Values that Affect  "Care: no  Food Allergies: NKFA  Factors Affecting Nutritional Intake: NPO    Anthropometrics    Temp: 97.8 °F (36.6 °C)  Height Method: Stated  Height: 5' 7" (170.2 cm)  Height (inches): 67 in  Weight Method: Bed Scale  Weight: 105.5 kg (232 lb 9.4 oz)  Weight (lb): 232.59 lb  Ideal Body Weight (IBW), Female: 135 lb  % Ideal Body Weight, Female (lb): 172.29 %  BMI (Calculated): 36.4  BMI Grade: 35 - 39.9 - obesity - grade II  Usual Body Weight (UBW), kg: (!) 136.4 kg  % Usual Body Weight: 90  % Weight Change From Usual Weight: -10.19 %    Lab/Procedures/Meds    Pertinent Labs Reviewed: reviewed  Pertinent Labs Comments: Creat 6.8, GFR 6.8, P 4.6, A1C 6.2  Pertinent Medications Reviewed: reviewed  Pertinent Medications Comments: -    Estimated/Assessed Needs    Weight Used For Calorie Calculations: 105.5 kg (232 lb 9.4 oz)    Energy Calorie Requirements (kcal): 2030 kcal/d  Energy Need Method: [place]-St Jeor (1.2 PAL)    Protein Requirements: 95 g/d (.9 g/kg)  Weight Used For Protein Calculations: 105.5 kg (232 lb 9.4 oz)    Estimated Fluid Requirement Method: other (see comments) (Per MD)  RDA Method (mL): 2030    CHO Requirement: 254g    Nutrition Prescription Ordered    Current Diet Order: NPO  Current Nutrition Support Formula Ordered: Diabetisource AC  Current Nutrition Support Rate Ordered: 15 mL/hr    Evaluation of Received Nutrient/Fluid Intake    Enteral Calories (kcal): 432  Enteral Protein (gm): 22  Enteral (Free Water) Fluid (mL): 295  Free Water Flush Fluid (mL): 600    I/O: -6.5L since 6/12    Energy Calories Required: not meeting needs  Protein Required: not meeting needs  Fluid Required: other (see comments) (Per MD)    Comments: LBM: 6/25    Tolerance: tolerating    Nutrition Risk    Level of Risk/Frequency of Follow-up:  (1x/week)     Monitor and Evaluation    Food and Nutrient Intake: enteral nutrition intake  Food and Nutrient Adminstration: enteral and parenteral nutrition " administration  Physical Activity and Function: nutrition-related ADLs and IADLs  Anthropometric Measurements: weight, weight change  Biochemical Data, Medical Tests and Procedures: inflammatory profile, lipid profile, glucose/endocrine profile, gastrointestinal profile, electrolyte and renal panel  Nutrition-Focused Physical Findings: overall appearance     Nutrition Follow-Up    RD Follow-up?: Yes

## 2024-06-26 NOTE — CONSULTS
[facility] - Tele[facility] Stepdown  Infectious Disease  Consult Note    Patient Name: Sarah Saravia  MRN: [id number]  Admission Date: 4/10/2024  Hospital Length of Stay: 77 days  Attending Physician: Soco Erickson MD  Primary Care Provider: Deanna Primary Doctor     Isolation Status: Contact        Inpatient consult to Infectious Diseases  Consult performed by: Mercedes Longoria NP  Consult ordered by: Jatin Hutchins MD        See progress note from 6/25      Mercedes Longoria NP  Infectious Disease  [facility] - Telemetry Stepdown

## 2024-06-26 NOTE — PT/OT/SLP PROGRESS
Physical Therapy Treatment    Patient Name:  Sarah Saravia   MRN:  [id number]    Recommendations:     Discharge Recommendations: Moderate Intensity Therapy  Discharge Equipment Recommendations: hospital bed, lift device, wheelchair  Barriers to discharge: Decreased caregiver support Pt requiring increased skilled assistance at current time.     Assessment:     Sarah Saravia is a 53 y.o. female admitted with a medical diagnosis of Sepsis.  She presents with the following impairments/functional limitations: weakness, impaired endurance, impaired self care skills, impaired functional mobility, impaired balance, decreased coordination, decreased upper extremity function, decreased lower extremity function, decreased safety awareness, pain, decreased ROM, impaired coordination requiring total assistance and verbal cues for bed mob, scooting to EOB/HOB, and sit > stand transitions due to weakness, fatigue.   In light of pt's current functional level and deficits, it is anticipated that pt will need to participate in a moderate intensity rehab program consisting of PT and OT in order to achieve full rehab potential to return to previous level of function and roles.  Pt remains motivated to participate in PT session and will cont to benefit from skilled PT intervention..    Rehab Prognosis: Fair; patient would benefit from acute skilled PT services to address these deficits and reach maximum level of function.    Recent Surgery: Procedure(s) (LRB):  Insertion, Catheter, Central Venous, Hemodialysis (Right)      Plan:     During this hospitalization, patient to be seen 3 x/week to address the identified rehab impairments via gait training, therapeutic activities, therapeutic exercises, neuromuscular re-education and progress toward the following goals:    Plan of Care Expires:  07/22/24    Subjective     Chief Complaint: weakness, fatigue  Pain/Comfort:  Pain Rating 1:  (no rating provided)  Location - Side 1:  Right  Location - Orientation 1: generalized  Location 1: knee (during ex's with knee flexion)  Pain Addressed 1: Reposition, Distraction, Cessation of Activity  Pain Rating Post-Intervention 1:  (no rating provided)      Objective:     Communicated with nurse (Araceli HORTON) prior to session.  Patient found HOB elevated  at 40* angle with PEG Tube (no family present) upon PT entry to room.     General Precautions: Standard, aphasia, aspiration, NPO  Orthopedic Precautions: N/A  Braces: N/A  Respiratory Status: Room air     Functional Mobility:  Bed Mobility:     Rolling Right: total assistance and of 2 persons with use of drawsheet  Scooting: anteriorly to the EOB with total A of 2 using drawsheet; to the HOB dependent of 3 with drawsheet  Supine to Sit: total assistance and of 2 persons for trunk elevation and LE's, exiting on the R side, HOB at 30* angle  Sit to Supine: dependence and of 2 persons for trunk and legs due to pt with decreased alertness/lethargic  Transfers:     Sit to Stand:   Not attempted on this date due to pt with mild R UE tremor while seated at the EOB and seemingly unresponsive    Balance: static sitting at the EOB with CGA to min/mod A for trunk control due to R post lean 2-3 min.  Pt presents with decreased alertness/lethargy and becoming unresponsive to external stimuli.  Pt was then assisted back to sup and nurse was notified immediately.  Pt with blank stare upon returning to sup and then closing her eyes.       AM-PAC 6 CLICK MOBILITY  Turning over in bed (including adjusting bedclothes, sheets and blankets)?: 2  Sitting down on and standing up from a chair with arms (e.g., wheelchair, bedside commode, etc.): 1  Moving from lying on back to sitting on the side of the bed?: 1  Moving to and from a bed to a chair (including a wheelchair)?: 1  Need to walk in hospital room?: 1  Climbing 3-5 steps with a railing?: 1  Basic Mobility Total Score: 7       Treatment & Education:  Patient provided  with daily orientation and goals of this PT session. They were educated to call for assistance and to transfer with hospital staff only.  Also, pt was educated on the effects of prolonged immobility and the importance of performing OOB activity and exercises to promote healing and reduce recovery time    Patient left HOB elevated with all lines intact and nurse and rapid response team present..    GOALS:   Multidisciplinary Problems       Physical Therapy Goals          Problem: Physical Therapy    Goal Priority Disciplines Outcome Goal Variances Interventions   Physical Therapy Goal     PT, PT/OT Progressing     Description: Goals to be met by: 24   Goals remain appropriate 5/3/2024 to be met by 24  Goals updated 2024 to be met by 24  Goals updated 24 to be met by 24  Goals remain appropriate 24 to be met by 24  Goals remain appropriate to be met by 24    Patient will increase functional independence with mobility by performin. Supine to sit with Maximum Assistance  2. Sit to supine with Maximum Assistance  3. Rolling to Left and Right with Moderate Assistance.  4. Sitting at edge of bed 5 minutes with Moderate Assistance- MET , monitor consistency  4a. Sitting at edge of bed 10 minutes with Supervision  5. Lower extremity exercise program x20 reps per handout, with assistance as needed  6. Sit to stand transfer with Maximum Assistance with or without LRAD  7. Stand for 2 minutes with Maximum Assistance with or without LRAD                         Time Tracking:     PT Received On: 24  PT Start Time: 1537     PT Stop Time: 1606  PT Total Time (min): 29 min     Billable Minutes: Therapeutic Activity 29    Treatment Type: Treatment  PT/PTA: PTA     Number of PTA visits since last PT visit: 2     2024

## 2024-06-26 NOTE — SUBJECTIVE & OBJECTIVE
Interval History: NAEO, VSS Infectious disease concerned fevers correlating with dialysis sessions, recommending removal of tunneled cath, Bcx monitoring, and subsequent replacement of cath. WBC scan discontinued    Review of Systems   Unable to perform ROS: Patient nonverbal     Objective:     Vital Signs (Most Recent):  Temp: 97.9 °F (36.6 °C) (06/26/24 1215)  Pulse: 109 (06/26/24 1215)  Resp: 18 (06/26/24 1215)  BP: 115/62 (06/26/24 1215)  SpO2: 100 % (06/26/24 0728) Vital Signs (24h Range):  Temp:  [97.6 °F (36.4 °C)-98.7 °F (37.1 °C)] 97.9 °F (36.6 °C)  Pulse:  [] 109  Resp:  [16-19] 18  SpO2:  [93 %-100 %] 100 %  BP: ()/(57-86) 115/62     Weight: 105.5 kg (232 lb 9.4 oz)  Body mass index is 36.43 kg/m².    Intake/Output Summary (Last 24 hours) at 6/26/2024 1241  Last data filed at 6/26/2024 1227  Gross per 24 hour   Intake 1050.22 ml   Output 2130 ml   Net -1079.78 ml         Physical Exam  Vitals reviewed.   Constitutional:       General: She is not in acute distress.     Appearance: She is not ill-appearing.   HENT:      Head: Normocephalic.      Nose: Nose normal. No congestion.      Mouth/Throat:      Mouth: Mucous membranes are moist. No injury or oral lesions.      Tongue: No lesions.   Eyes:      General:         Right eye: No discharge.         Left eye: No discharge.      Conjunctiva/sclera: Conjunctivae normal.   Cardiovascular:      Rate and Rhythm: Normal rate and regular rhythm.      Pulses: Normal pulses.      Heart sounds: Normal heart sounds. No murmur heard.  Pulmonary:      Effort: Pulmonary effort is normal. No respiratory distress.      Breath sounds: Normal breath sounds. No stridor. No wheezing, rhonchi or rales.   Abdominal:      General: Bowel sounds are normal. There is no distension.      Palpations: Abdomen is soft.      Tenderness: There is no abdominal tenderness. There is no right CVA tenderness or left CVA tenderness.      Comments: Left PEG site without skin  breakdown, drainage, redness, swelling.    Musculoskeletal:      Cervical back: Normal range of motion.      Right lower leg: No edema.      Left lower leg: No edema.   Skin:     Findings: No erythema, lesion or rash.      Comments: With right tunneled line. Without surrounding redness, swelling. Without tenderness to palpation.    Neurological:      Mental Status: She is alert. Mental status is at baseline.             Significant Labs: All pertinent labs within the past 24 hours have been reviewed.    Significant Imaging: I have reviewed all pertinent imaging results/findings within the past 24 hours.

## 2024-06-26 NOTE — ASSESSMENT & PLAN NOTE
Patient has hyponatremia which is uncontrolled,We will aim to correct the sodium by 4-6mEq in 24 hours. We will monitor sodium Daily. The hyponatremia is due to ESRD. Discussed with nephrology, dialysate bath adjusted to account for and correct hyponatremia.  Recent Labs   Lab 06/26/24  0431

## 2024-06-26 NOTE — NURSING
Pt to [facility] via bed for HD. HD started via RIJ CVC.  Cathflo removed, still issues with flow.  Treatment running at  at this time

## 2024-06-26 NOTE — PROGRESS NOTES
[facility] - Telemetry Stepdown  Nephrology  Progress Note    Patient Name: Sarah Saravia  MRN: [id number]  Admission Date: 4/10/2024  Hospital Length of Stay: 77 days  Attending Provider: Soco Erickson MD   Primary Care Physician: Deanna, Primary Doctor  Principal Problem:Sepsis    Subjective:     HPI: The patient is a 53 y.o. 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 The patient is unable to provide adequate history. Additional history and patient information was obtained from patient's daughter, past medical records and ER records. Per chart, she was undergoing dialysis Wednesday and was noted to be more lethargic than usual despite completing her HD session. EMS was called, fever of a 100. In the ED, UA 2+ leuks, >100 WBC, many bacteria, WBC 17, CT abd/pelvis concerning for cystitis. Given vanc/zosyn and admitted.     Of note, the patient was initiated on RRT in February 2024 after being admitted with ALVARADO 2/2 iATN due to septic shock. Perm cath placed on 2/29/24. She was transferred to [facility] on 3/12. Interventional Nephrology was consulted for concerns of potential tunneled dialysis line infection. She developed fever on 6/22 with a T-max 38.1C and leukocytosis sporadically throughout her hospital stay with the most recent episode of leukocytosis being on 6/22 where it reached a peak of 18.94. She received meropenum through 6/22 and last dose of vancomycin was on 6/21. Cultures thus far are positive for Proteus Mirabilis ESBL from urine cultures on 4/11, Staph Epidermis in 2/2 vials on 4/10, pseudomonas in groin abscess on 3/8. All cultures taken this hospital stay have been negative.       Interval History: no overnight events, will plan for removal of TDC tomorrow.     Review of patient's allergies indicates:  No Known Allergies  Current  Facility-Administered Medications   Medication Frequency    acetaminophen oral solution 650 mg Q6H PRN    ascorbic acid (vitamin C) tablet 500 mg BID    aspirin chewable tablet 81 mg Daily    atorvastatin tablet 40 mg Daily    dextrose 10% bolus 125 mL 125 mL PRN    dextrose 10% bolus 250 mL 250 mL PRN    epoetin merry injection 6,100 Units Every Mon, Wed, Fri    glucagon (human recombinant) injection 1 mg PRN    glucose chewable tablet 16 g PRN    glucose chewable tablet 24 g PRN    heparin (porcine) injection 1,000 Units PRN    heparin (porcine) injection 3,000 Units PRN    heparin (porcine) injection 5,000 Units Q8H    hepatitis B virus vacc.rec(PF) injection 20 mcg vaccine x 1 dose    insulin aspart U-100 pen 0-10 Units QID (AC + HS) PRN    insulin glargine U-100 (Lantus) pen 13 Units QHS    metoprolol tartrate (LOPRESSOR) tablet 25 mg BID    ondansetron 4 mg/5 mL solution 4 mg Q6H PRN    oxyCODONE 5 mg/5 mL solution 5 mg Q4H PRN    pantoprazole suspension 40 mg Daily    polyethylene glycol packet 17 g TID    sodium chloride 0.9% bolus 250 mL 250 mL PRN    sodium chloride 0.9% flush 10 mL Q12H PRN    sodium chloride 0.9% flush 10 mL PRN       Objective:     Vital Signs (Most Recent):  Temp: 97.9 °F (36.6 °C) (06/26/24 1215)  Pulse: 109 (06/26/24 1215)  Resp: 18 (06/26/24 1215)  BP: 115/62 (06/26/24 1215)  SpO2: 100 % (06/26/24 1300) Vital Signs (24h Range):  Temp:  [97.6 °F (36.4 °C)-98.7 °F (37.1 °C)] 97.9 °F (36.6 °C)  Pulse:  [] 109  Resp:  [16-19] 18  SpO2:  [93 %-100 %] 100 %  BP: ()/(57-86) 115/62     Weight: 105.5 kg (232 lb 9.4 oz) (06/26/24 1000)  Body mass index is 36.43 kg/m².  Body surface area is 2.23 meters squared.    I/O last 3 completed shifts:  In: 1018 [NG/GT:1018]  Out: 30 [Urine:30]     Physical Exam  Constitutional:       General: She is not in acute distress.     Appearance: Normal appearance. She is not ill-appearing or toxic-appearing.   HENT:      Head: Normocephalic and  atraumatic.      Right Ear: External ear normal.      Left Ear: External ear normal.      Mouth/Throat:      Mouth: Mucous membranes are moist.   Eyes:      Extraocular Movements: Extraocular movements intact.   Cardiovascular:      Rate and Rhythm: Normal rate and regular rhythm.      Pulses: Normal pulses.      Heart sounds: Normal heart sounds.   Pulmonary:      Effort: Pulmonary effort is normal. No respiratory distress.      Breath sounds: Normal breath sounds. No wheezing or rales.   Chest:      Chest wall: No tenderness.   Abdominal:      General: Abdomen is flat. There is no distension.      Palpations: Abdomen is soft.      Tenderness: There is no abdominal tenderness. There is no right CVA tenderness, left CVA tenderness or guarding.   Musculoskeletal:         General: No swelling. Normal range of motion.      Cervical back: Normal range of motion.      Right lower leg: No edema.      Left lower leg: No edema.   Skin:     General: Skin is warm.   Neurological:      Mental Status: She is alert.          Significant Labs:  CBC:   Recent Labs   Lab 06/26/24  0431   WBC 7.72   RBC 3.77*   HGB 9.0*   HCT 30.6*      MCV 81*   MCH 23.9*   MCHC 29.4*     CMP:   Recent Labs   Lab 06/26/24  0431   *   CALCIUM 10.5   ALBUMIN 3.0*   PROT 7.8      K 4.3   CO2 22*   CL 98   BUN 30*   CREATININE 6.8*   ALKPHOS 77   ALT 8*   AST 17   BILITOT 0.3     All labs within the past 24 hours have been reviewed.     Assessment/Plan:     Renal/  Complication of vascular dialysis catheter  -Will plan for removal of tunneled dialysis catheter in the morning  -Please keep NPO after midnight  -Sending PT/aPTT now  -Hold heparin after midnight.         Thank you for your consult. I will follow-up with patient. Please contact us if you have any additional questions.    Mathew Steinberg MD  Nephrology  [facility] - Telemetry Stepdown

## 2024-06-26 NOTE — NURSING
Nurses Note -- 4 Eyes      6/25/2024   8:22 PM      Skin assessed during: Q Shift Change      [] No Altered Skin Integrity Present    []Prevention Measures Documented      [x] Yes- Altered Skin Integrity Present or Discovered   [] LDA Added if Not in Epic (Describe Wound)   [] New Altered Skin Integrity was Present on Admit and Documented in LDA   [] Wound Image Taken    Wound Care Consulted? No    Attending Nurse:  Raoul Eastman RN/Staff Member:  Beth HORTON RN

## 2024-06-26 NOTE — ASSESSMENT & PLAN NOTE
This patient does have evidence of infective focus. My overall impression is sepsis. Source: Skin and Soft Tissue (location Abdominal). Not requiring pressors. Source control achieved by: vanc/meropenem.  Concerns septic source may be PEG site with associated purulent wound despite Wound Care attempts to protect with barriers. PEG placed by General Surgery 2/2024. General Surgery consulted regarding PEG site ordered CT No evidence of infection  US extremities negative for thrombus   CT pan scan w/ IV contrast without evidence of infection  Interventional nephrology consulted for line eval - low suspicion for clot, getting blood cx to r/o infected line  Peripheral smear with findings concerning for infection    No fevers since abx dcd  WBC tagged scan canceled for time being    - Id consulted, recommending removal of tunneled catheter, blood cx clearance, and replacement

## 2024-06-26 NOTE — CODE/ RAPID DOCUMENTATION
RAPID RESPONSE NURSE NOTE        Admit Date: 4/10/2024  LOS: 77  Code Status: Full Code   Date of Consult: 2024  : 1970  Age: 53 y.o.  Weight:   Wt Readings from Last 1 Encounters:   24 105.5 kg (232 lb 9.4 oz)     Sex: female  Race: Black or    Bed: 80/[facility] A:   MRN: [id number]  Time Rapid Response Team page Received: 1545  Time Rapid Response Team at Bedside: 1548  Time Rapid Response Team left Bedside: 1600  Was the patient discharged from an ICU this admission? No   Was the patient discharged from a PACU within last 24 hours? No   Did the patient receive conscious sedation/general anesthesia in last 24 hours? No  Was the patient in the ED within the past 24 hours? No  Was the patient on NIPPV within the past 24 hours? No   Did this progress into an ARC or CPA: No  Attending Physician: Soco Erickson MD  Primary Service: [facility] 4       SITUATION    Notified by pager.  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    Last Vitals:  Temp: 97.8 °F (36.6 °C) ( 1537)  Pulse: 124 ( 1537)  Resp: 20 ( 1537)  BP: 113/58 ( 1537)  SpO2: 98 % ( 1537)    24 Hours Vitals Range:  Temp:  [97.6 °F (36.4 °C)-98.4 °F (36.9 °C)]   Pulse:  []   Resp:  [16-20]   BP: ()/(57-86)   SpO2:  [98 %-100 %]     Labs:  Recent Labs     24  0758 24  0431 24   WBC 7.56 13.39* 7.72   HGB 9.0* 10.5* 9.0*   HCT 31.4* 35.1* 30.6*    348 340       Recent Labs     24  0758 241 24   * 135* 136   K 4.1 4.0 4.3   CL 99 99 98   CO2 19* 19* 22*   BUN 33* 20 30*   CREATININE 7.8* 5.2* 6.8*    122* 116*   PHOS 4.7* 3.4 4.6*   MG 2.4 2.2 2.3        Patient in bed.  . Bp 113/58. Patient Aphasic at baseline but usually follows simple commands according to family. Patient will track people in the room but not willing to participate with staff.     ASSESSMENT     Physical Exam  Vitals and nursing note reviewed.   Constitutional:       Appearance: She is obese.   Eyes:      Pupils: Pupils are equal, round, and reactive to light.   Neurological:      Mental Status: She is alert.      GCS: GCS eye subscore is 4. GCS verbal subscore is 1. GCS motor subscore is 5.   Psychiatric:         Speech: She is noncommunicative.         Behavior: Behavior is uncooperative.         INTERVENTIONS    The patient was seen for Neurological problem. Staff concerns included mental status change. The following interventions were performed: BMP.    RECOMMENDATIONS    We recommend: -BMP with Mg  -continuous telemetry  -consult neurology if primary deems   Necessary.   -EkG    PROVIDER ESCALATION        Primary team arrival time: 1600    Disposition: Remain in room 8034.    FOLLOW UP    Bedside Beth BARBER  updated on plan of care. Instructed to call the Rapid Response Nurse, Wilian Bustos RN at [phone] for additional questions or concerns.

## 2024-06-26 NOTE — NURSING
"Receive report from dialysis, patient returned to room,vs wnl.  Patient was in room working with therapy.  Therapy called out to say," the patient had a change in status".  Patient had a deep stare and would not respond to staff.  Patient had noted fine tremors.  Vs wnl, however heart was 122.  Patient responded to daughter at bedside.  Rapid nurse enter the room.  Doctor SOL Erickson was notified.  Patient is stable will continue with current plan of care.  "

## 2024-06-27 LAB
ALBUMIN SERPL BCP-MCNC: 3 G/DL (ref 3.5–5.2)
ALP SERPL-CCNC: 75 U/L (ref 55–135)
ALT SERPL W/O P-5'-P-CCNC: 8 U/L (ref 10–44)
ANION GAP SERPL CALC-SCNC: 14 MMOL/L (ref 8–16)
AST SERPL-CCNC: 18 U/L (ref 10–40)
BASOPHILS # BLD AUTO: 0.09 K/UL (ref 0–0.2)
BASOPHILS NFR BLD: 0.5 % (ref 0–1.9)
BILIRUB SERPL-MCNC: 0.4 MG/DL (ref 0.1–1)
BUN SERPL-MCNC: 20 MG/DL (ref 6–20)
CALCIUM SERPL-MCNC: 10.2 MG/DL (ref 8.7–10.5)
CHLORIDE SERPL-SCNC: 99 MMOL/L (ref 95–110)
CO2 SERPL-SCNC: 19 MMOL/L (ref 23–29)
CREAT SERPL-MCNC: 5.5 MG/DL (ref 0.5–1.4)
DIFFERENTIAL METHOD BLD: ABNORMAL
EOSINOPHIL # BLD AUTO: 0.1 K/UL (ref 0–0.5)
EOSINOPHIL NFR BLD: 0.7 % (ref 0–8)
ERYTHROCYTE [DISTWIDTH] IN BLOOD BY AUTOMATED COUNT: 17 % (ref 11.5–14.5)
EST. GFR  (NO RACE VARIABLE): 8.7 ML/MIN/1.73 M^2
GLUCOSE SERPL-MCNC: 137 MG/DL (ref 70–110)
HCT VFR BLD AUTO: 32.8 % (ref 37–48.5)
HGB BLD-MCNC: 9.5 G/DL (ref 12–16)
IMM GRANULOCYTES # BLD AUTO: 0.11 K/UL (ref 0–0.04)
IMM GRANULOCYTES NFR BLD AUTO: 0.6 % (ref 0–0.5)
LYMPHOCYTES # BLD AUTO: 2.3 K/UL (ref 1–4.8)
LYMPHOCYTES NFR BLD: 13.3 % (ref 18–48)
MAGNESIUM SERPL-MCNC: 2 MG/DL (ref 1.6–2.6)
MCH RBC QN AUTO: 23.8 PG (ref 27–31)
MCHC RBC AUTO-ENTMCNC: 29 G/DL (ref 32–36)
MCV RBC AUTO: 82 FL (ref 82–98)
MONOCYTES # BLD AUTO: 1.7 K/UL (ref 0.3–1)
MONOCYTES NFR BLD: 10 % (ref 4–15)
NEUTROPHILS # BLD AUTO: 13 K/UL (ref 1.8–7.7)
NEUTROPHILS NFR BLD: 74.9 % (ref 38–73)
NRBC BLD-RTO: 0 /100 WBC
OHS QRS DURATION: 132 MS
OHS QTC CALCULATION: 489 MS
PHOSPHATE SERPL-MCNC: 3.5 MG/DL (ref 2.7–4.5)
PLATELET # BLD AUTO: 308 K/UL (ref 150–450)
PMV BLD AUTO: 10.2 FL (ref 9.2–12.9)
POCT GLUCOSE: 146 MG/DL (ref 70–110)
POCT GLUCOSE: 158 MG/DL (ref 70–110)
POCT GLUCOSE: 166 MG/DL (ref 70–110)
POCT GLUCOSE: 167 MG/DL (ref 70–110)
POCT GLUCOSE: 220 MG/DL (ref 70–110)
POTASSIUM SERPL-SCNC: 4.3 MMOL/L (ref 3.5–5.1)
PROT SERPL-MCNC: 8.2 G/DL (ref 6–8.4)
PTH-INTACT SERPL-MCNC: 54.8 PG/ML (ref 9–77)
RBC # BLD AUTO: 4 M/UL (ref 4–5.4)
SODIUM SERPL-SCNC: 132 MMOL/L (ref 136–145)
WBC # BLD AUTO: 17.42 K/UL (ref 3.9–12.7)

## 2024-06-27 PROCEDURE — 99153 MOD SED SAME PHYS/QHP EA: CPT | Performed by: INTERNAL MEDICINE

## 2024-06-27 PROCEDURE — 87077 CULTURE AEROBIC IDENTIFY: CPT | Performed by: HOSPITALIST

## 2024-06-27 PROCEDURE — 99152 MOD SED SAME PHYS/QHP 5/>YRS: CPT | Performed by: INTERNAL MEDICINE

## 2024-06-27 PROCEDURE — 63600175 PHARM REV CODE 636 W HCPCS: Mod: JZ,JG | Performed by: INTERNAL MEDICINE

## 2024-06-27 PROCEDURE — 36581 REPLACE TUNNELED CV CATH: CPT | Mod: RT | Performed by: INTERNAL MEDICINE

## 2024-06-27 PROCEDURE — 25000003 PHARM REV CODE 250: Performed by: INTERNAL MEDICINE

## 2024-06-27 PROCEDURE — 83735 ASSAY OF MAGNESIUM: CPT

## 2024-06-27 PROCEDURE — 25000003 PHARM REV CODE 250

## 2024-06-27 PROCEDURE — 84100 ASSAY OF PHOSPHORUS: CPT

## 2024-06-27 PROCEDURE — 80053 COMPREHEN METABOLIC PANEL: CPT

## 2024-06-27 PROCEDURE — 36415 COLL VENOUS BLD VENIPUNCTURE: CPT

## 2024-06-27 PROCEDURE — 25000003 PHARM REV CODE 250: Performed by: STUDENT IN AN ORGANIZED HEALTH CARE EDUCATION/TRAINING PROGRAM

## 2024-06-27 PROCEDURE — C1750 CATH, HEMODIALYSIS,LONG-TERM: HCPCS | Performed by: INTERNAL MEDICINE

## 2024-06-27 PROCEDURE — 87070 CULTURE OTHR SPECIMN AEROBIC: CPT | Performed by: HOSPITALIST

## 2024-06-27 PROCEDURE — C1769 GUIDE WIRE: HCPCS | Performed by: INTERNAL MEDICINE

## 2024-06-27 PROCEDURE — 77001 FLUOROGUIDE FOR VEIN DEVICE: CPT | Mod: 26,,, | Performed by: INTERNAL MEDICINE

## 2024-06-27 PROCEDURE — 87186 SC STD MICRODIL/AGAR DIL: CPT | Performed by: HOSPITALIST

## 2024-06-27 PROCEDURE — 97535 SELF CARE MNGMENT TRAINING: CPT

## 2024-06-27 PROCEDURE — 36581 REPLACE TUNNELED CV CATH: CPT | Mod: RT,,, | Performed by: INTERNAL MEDICINE

## 2024-06-27 PROCEDURE — 20600001 HC STEP DOWN PRIVATE ROOM

## 2024-06-27 PROCEDURE — 83970 ASSAY OF PARATHORMONE: CPT

## 2024-06-27 PROCEDURE — 27000207 HC ISOLATION

## 2024-06-27 PROCEDURE — 85025 COMPLETE CBC W/AUTO DIFF WBC: CPT

## 2024-06-27 PROCEDURE — 77001 FLUOROGUIDE FOR VEIN DEVICE: CPT | Performed by: INTERNAL MEDICINE

## 2024-06-27 DEVICE — GLIDEPATH HEMODIALYSIS CATH, ST, DL 14.5 FR. 19CM
Type: IMPLANTABLE DEVICE | Site: NECK | Status: FUNCTIONAL
Brand: GLIDEPATH LONG-TERM HEMODIALYSIS CATHETER WITH PRELOADED STYLET

## 2024-06-27 RX ORDER — ACETAMINOPHEN 10 MG/ML
INJECTION, SOLUTION INTRAVENOUS
Status: DISCONTINUED | OUTPATIENT
Start: 2024-06-27 | End: 2024-06-28

## 2024-06-27 RX ORDER — SODIUM CHLORIDE 9 MG/ML
INJECTION, SOLUTION INTRAVENOUS ONCE
OUTPATIENT
Start: 2024-06-28

## 2024-06-27 RX ORDER — MIDAZOLAM HYDROCHLORIDE 1 MG/ML
INJECTION, SOLUTION INTRAMUSCULAR; INTRAVENOUS
Status: DISCONTINUED | OUTPATIENT
Start: 2024-06-27 | End: 2024-06-27 | Stop reason: HOSPADM

## 2024-06-27 RX ORDER — NITROGLYCERIN 400 UG/1
SPRAY ORAL
Status: DISCONTINUED | OUTPATIENT
Start: 2024-06-27 | End: 2024-06-27 | Stop reason: HOSPADM

## 2024-06-27 RX ADMIN — INSULIN GLARGINE 13 UNITS: 100 INJECTION, SOLUTION SUBCUTANEOUS at 08:06

## 2024-06-27 RX ADMIN — POLYETHYLENE GLYCOL 3350 17 G: 17 POWDER, FOR SOLUTION ORAL at 08:06

## 2024-06-27 RX ADMIN — Medication 500 MG: at 09:06

## 2024-06-27 RX ADMIN — Medication 500 MG: at 08:06

## 2024-06-27 RX ADMIN — METOPROLOL TARTRATE 25 MG: 25 TABLET, FILM COATED ORAL at 09:06

## 2024-06-27 RX ADMIN — METOPROLOL TARTRATE 25 MG: 25 TABLET, FILM COATED ORAL at 08:06

## 2024-06-27 RX ADMIN — POLYETHYLENE GLYCOL 3350 17 G: 17 POWDER, FOR SOLUTION ORAL at 09:06

## 2024-06-27 RX ADMIN — PANTOPRAZOLE SODIUM 40 MG: 40 GRANULE, DELAYED RELEASE ORAL at 09:06

## 2024-06-27 RX ADMIN — ATORVASTATIN CALCIUM 40 MG: 40 TABLET, FILM COATED ORAL at 09:06

## 2024-06-27 RX ADMIN — ASPIRIN 81 MG CHEWABLE TABLET 81 MG: 81 TABLET CHEWABLE at 09:06

## 2024-06-27 NOTE — INTERVAL H&P NOTE
The patient has been examined and the H&P has been reviewed:    I concur with the findings and no changes have occurred since H&P was written.    Procedure risks, benefits and alternative options discussed and understood by patient/family.          Active Hospital Problems    Diagnosis  POA    *Sepsis [A41.9]  Yes    Cervical stenosis of spinal canal [M48.02]  Yes    Irritant contact dermatitis due friction or contact with other specified body fluids [L24.A9]  No    Debility [R53.81]  Yes    Complication of vascular dialysis catheter [T82.9XXA]  No    Moderate malnutrition [E44.0]  Yes    Constipation [K59.00]  No    Prolonged QT interval [R94.31]  Yes    ESRD (end stage renal disease) on dialysis [N18.6, Z99.2]  Not Applicable    Spastic hemiplegia of right dominant side as late effect of cerebrovascular disease [I69.951]  Not Applicable    PEG (percutaneous endoscopic gastrostomy) status [Z93.1]  Not Applicable    Leukocytosis [D72.829]  Yes    Type 2 diabetes mellitus with hyperglycemia, with long-term current use of insulin [E11.65, Z79.4]  Not Applicable    Hyponatremia [E87.1]  Yes     POA, Na      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

## 2024-06-27 NOTE — ASSESSMENT & PLAN NOTE
Patient has hyponatremia which is uncontrolled,We will aim to correct the sodium by 4-6mEq in 24 hours. We will monitor sodium Daily. The hyponatremia is due to ESRD. Discussed with nephrology, dialysate bath adjusted to account for and correct hyponatremia.  Recent Labs   Lab 06/27/24  0620   *

## 2024-06-27 NOTE — ASSESSMENT & PLAN NOTE
Adjusting insulin to account for diabetic TF    Patient's FSGs are controlled on current medication regimen.  Last A1c reviewed-   Lab Results   Component Value Date    HGBA1C 6.2 (H) 05/27/2024     Most recent fingerstick glucose reviewed-   Recent Labs   Lab 06/26/24  1610 06/26/24  2019 06/27/24  0737 06/27/24  0817   POCTGLUCOSE 176* 184* 167* 166*       Current correctional scale  Medium  Maintain anti-hyperglycemic dose as follows-   Antihyperglycemics (From admission, onward)    Start     Stop Route Frequency Ordered    06/09/24 2100  insulin glargine U-100 (Lantus) pen 13 Units         -- SubQ Nightly 06/09/24 0756    06/09/24 1255  insulin aspart U-100 pen 0-10 Units         -- SubQ Before meals & nightly PRN 06/09/24 1155        Hold Oral hypoglycemics while patient is in the hospital.  POCT glucose checks   Continue tube feeds

## 2024-06-27 NOTE — PT/OT/SLP PROGRESS
Occupational Therapy   Treatment    Name: Sarah Saravia  MRN: [id number]  Admitting Diagnosis:  Sepsis       Recommendations:     Discharge Recommendations: Moderate Intensity Therapy  Discharge Equipment Recommendations:  hospital bed, lift device, wheelchair  Barriers to discharge:  Other (Comment) (requires extensive assist)    Assessment:     Sarah Saravia is a 53 y.o. female with a medical diagnosis of Sepsis.  She presents with deficits in mobility and self-care tasks. Pt. Alert throughout session. No verbalizations noted. Pt. Continues to require significant assist for mobility. Patient would benefit from continued OT services to maximize level of safety and independence with self-care tasks.   . Performance deficits affecting function are weakness, impaired endurance, impaired self care skills, impaired functional mobility, impaired cognition.     Rehab Prognosis:  Fair; patient would benefit from acute skilled OT services to address these deficits and reach maximum level of function.       Plan:     Patient to be seen 2 x/week to address the above listed problems via self-care/home management, therapeutic activities, therapeutic exercises, neuromuscular re-education, cognitive retraining  Plan of Care Expires: 07/03/24  Plan of Care Reviewed with: patient    Subjective     Chief Complaint: pt. aphasic  Patient/Family Comments/goals: no goals stated but agreeable to session  Pain/Comfort:  Pain Rating 1: 0/10  Pain Rating Post-Intervention 1: 0/10    Objective:     Communicated with: nurse prior to session.  Patient found supine with PEG Tube, Trialysis upon OT entry to room. Pt. Soiled upon OT arrival and assist to nurse for mobility to clean provided. HD site appeared to be somewhat loosely attached on this date therefore sitting EOB not performed (scheduled for HD tunneled cath on this pm)    General Precautions: Standard, aspiration, NPO, aphasia    Orthopedic Precautions:N/A  Braces: N/A  Respiratory  Status: Room air     Occupational Performance:     Bed Mobility:    Patient completed Rolling/Turning to Left with  total assistance and 2 persons  Patient completed Rolling/Turning to Right with total assistance and 2 persons     Functional Mobility/Transfers:  Drawsheet x 2 to HOB  Functional Mobility: not tested    Activities of Daily Living:  Toileting: total assistance for cleaning      AMPAC 6 Click ADL: 9    Treatment & Education:  Pt. Positioned with HOB elevated as much as possible to assume chair like positioning    Patient left HOB elevated with all lines intact, call button in reach, and nurse present    GOALS:   Multidisciplinary Problems       Occupational Therapy Goals          Problem: Occupational Therapy    Goal Priority Disciplines Outcome Interventions   Occupational Therapy Goal     OT, PT/OT Progressing    Description: Goals to be met by: 5/22/24 Goals revised as needed on 05-30 to be met by 06-19:Goals revised and extended to 07-03    Patient will increase functional independence with ADLs by performing:    Revised: UBD with Min A NOT MET  New Goal: UBD seated EOB with Mod A  Revised: Grooming seated EOB with CGA NOT MET  New Goal groom seated EOB with Min A  Revised: Sit EOB x 20 minutes with SBA  MET 06-10-24  Rolling to Bilateral with Moderate Assistance.NOT MET 06-19     Supine to sit with Maximum Assistance. NOT MET 06-19                           Time Tracking:     OT Date of Treatment: 06/27/24  OT Start Time: 0955  OT Stop Time: 1025  OT Total Time (min): 30 min    Billable Minutes:Self Care/Home Management 30    OT/JOSÉ: OT     Number of JOSÉ visits since last OT visit: 0    6/27/2024

## 2024-06-27 NOTE — ASSESSMENT & PLAN NOTE
Creatine stable for now. BMP reviewed- noted Estimated Creatinine Clearance: 14.8 mL/min (A) (based on SCr of 5.5 mg/dL (H)). according to latest data. Based on current GFR, CKD stage is end stage.  Monitor UOP and serial BMP and adjust therapy as needed. Renally dose meds. Avoid nephrotoxic medications and procedures.    -- HD MWF  -- nephro following  -- Hypophosphatemic on sevelamer  -- SW onboard for placement to [facility] to continue dialysis.

## 2024-06-27 NOTE — SUBJECTIVE & OBJECTIVE
Interval History: NAEON. Patient going for TDC exchange  today.     Review of Systems   Unable to perform ROS: Patient nonverbal     Objective:     Vital Signs (Most Recent):  Temp: 98 °F (36.7 °C) (06/27/24 0755)  Pulse: 97 (06/27/24 0755)  Resp: 18 (06/27/24 0755)  BP: 126/81 (06/27/24 0755)  SpO2: 98 % (06/27/24 0755) Vital Signs (24h Range):  Temp:  [97 °F (36.1 °C)-99 °F (37.2 °C)] 98 °F (36.7 °C)  Pulse:  [] 97  Resp:  [17-20] 18  SpO2:  [97 %-100 %] 98 %  BP: ()/(57-81) 126/81     Weight: 105.5 kg (232 lb 9.4 oz)  Body mass index is 36.43 kg/m².    Intake/Output Summary (Last 24 hours) at 6/27/2024 0823  Last data filed at 6/26/2024 1227  Gross per 24 hour   Intake 650.22 ml   Output 2100 ml   Net -1449.78 ml         Physical Exam  Constitutional:       Appearance: Normal appearance.   HENT:      Head: Normocephalic and atraumatic.      Right Ear: External ear normal.      Left Ear: External ear normal.   Cardiovascular:      Rate and Rhythm: Normal rate and regular rhythm.      Pulses: Normal pulses.      Heart sounds: Normal heart sounds.   Pulmonary:      Effort: Pulmonary effort is normal.      Breath sounds: Normal breath sounds.   Abdominal:      General: Abdomen is flat.      Palpations: Abdomen is soft.   Musculoskeletal:         General: Normal range of motion.   Skin:     General: Skin is warm.      Capillary Refill: Capillary refill takes less than 2 seconds.   Neurological:      General: No focal deficit present.      Mental Status: She is alert and oriented to person, place, and time.             Significant Labs: All pertinent labs within the past 24 hours have been reviewed.  CBC:   Recent Labs   Lab 06/26/24  0431 06/27/24  0620   WBC 7.72 17.42*   HGB 9.0* 9.5*   HCT 30.6* 32.8*    308     CMP:   Recent Labs   Lab 06/26/24  0431 06/26/24  1614 06/27/24  0620    132* 132*   K 4.3 4.2 4.3   CL 98 98 99   CO2 22* 18* 19*   * 161* 137*   BUN 30* 15 20   CREATININE  6.8* 4.5* 5.5*   CALCIUM 10.5 10.1 10.2   PROT 7.8  --  8.2   ALBUMIN 3.0*  --  3.0*   BILITOT 0.3  --  0.4   ALKPHOS 77  --  75   AST 17  --  18   ALT 8*  --  8*   ANIONGAP 16 16 14       Significant Imaging: I have reviewed all pertinent imaging results/findings within the past 24 hours.

## 2024-06-27 NOTE — NURSING
Patient transported to Cath Lab vis stretcher. VSS. NAD noted at this time 22G to the left posterior hand. Flushed and saline locked.

## 2024-06-27 NOTE — NURSING
Nurses Note -- 4 Eyes      6/27/2024   11:09 AM      Skin assessed during: Q Shift Change      [] No Altered Skin Integrity Present    []Prevention Measures Documented      [x] Yes- Altered Skin Integrity Present or Discovered   [] LDA Added if Not in Epic (Describe Wound)   [] New Altered Skin Integrity was Present on Admit and Documented in LDA   [] Wound Image Taken    Wound Care Consulted? No    Attending Nurse:  ELISABETH Hernandez     Second RN/Staff Member:  CAMILLE Avila

## 2024-06-27 NOTE — PROGRESS NOTES
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 History was given from patient's daughter. She was undergoing dialysis today and noticed she was lethargic, less alert than usual self. Pt completed dialysis and still not acting herself. EMS was called, fever of a 100.  On chart review, she did have an episode of large volume emesis around 1700.  Per EMS, she had a slightly elevated temp 100.0°F, glucose 300s.     In the ED: UA 2+ leuks, >100 WBC, many bacteria, WBC 17, CT abd/pelvis concerning for cystitis. Given vanc/zosyn    Overview/Hospital Course:  53 JARROD admitted to Hospital Medicine service for urosepsis. Vanc/zosyn initiated, found to have staph epi in all 4 bottles, vanc/ertapenem course completed per ID. Vascular Neurology consulted concerning mental status change with the recommendation to initiate ASA 81 QD and to obtain an MRI to r/o new stroke. Per discussion with vascular neurology, MRI findings appear to be expected changes from prior stroke. Nephrology was consulted for regularly scheduled dialysis. Pulm consulted, patient decannulated 4/30. Failed swallow assessment, continuing tube feeds. Ongoing placement difficulties d/t need for accepting HD facility to have sandee lift with 2 personnel to operate. Once patient gets accepted at [facility], next step will be to work with daughter on skilled nursing NH placement. SW onboard working on paperwork for long term Medicaid application. H/c c/b new fever, ID reconsulted, CT chest showing bilateral ground glass  opacities, Vanc/meropenem course to be completed 6/17, however patient had further episode of fever and will need continued merem and ID consulted for assistance. CT pan scan to assess for infection. Imaging without signs of infection, patient to complete empiric abx for 5 days. Patient continues fevering despite broad abx, no identified source. Chest x-ray, peripheral smear to assess for other sources. Infectious disease concerned fevers correlating with dialysis sessions, recommending removal of tunneled cath, Bcx monitoring, and subsequent replacement of cath.    Interval History: NAEON. Patient going for TDC exchange  today.     Review of Systems   Unable to perform ROS: Patient nonverbal     Objective:     Vital Signs (Most Recent):  Temp: 98 °F (36.7 °C) (06/27/24 0755)  Pulse: 97 (06/27/24 0755)  Resp: 18 (06/27/24 0755)  BP: 126/81 (06/27/24 0755)  SpO2: 98 % (06/27/24 0755) Vital Signs (24h Range):  Temp:  [97 °F (36.1 °C)-99 °F (37.2 °C)] 98 °F (36.7 °C)  Pulse:  [] 97  Resp:  [17-20] 18  SpO2:  [97 %-100 %] 98 %  BP: ()/(57-81) 126/81     Weight: 105.5 kg (232 lb 9.4 oz)  Body mass index is 36.43 kg/m².    Intake/Output Summary (Last 24 hours) at 6/27/2024 0823  Last data filed at 6/26/2024 1227  Gross per 24 hour   Intake 650.22 ml   Output 2100 ml   Net -1449.78 ml         Physical Exam  Constitutional:       Appearance: Normal appearance.   HENT:      Head: Normocephalic and atraumatic.      Right Ear: External ear normal.      Left Ear: External ear normal.   Cardiovascular:      Rate and Rhythm: Normal rate and regular rhythm.      Pulses: Normal pulses.      Heart sounds: Normal heart sounds.   Pulmonary:      Effort: Pulmonary effort is normal.      Breath sounds: Normal breath sounds.   Abdominal:      General: Abdomen is flat.      Palpations: Abdomen is soft.   Musculoskeletal:         General: Normal range of motion.   Skin:     General: Skin is warm.      Capillary Refill:  Capillary refill takes less than 2 seconds.   Neurological:      General: No focal deficit present.      Mental Status: She is alert and oriented to person, place, and time.             Significant Labs: All pertinent labs within the past 24 hours have been reviewed.  CBC:   Recent Labs   Lab 06/26/24  0431 06/27/24  0620   WBC 7.72 17.42*   HGB 9.0* 9.5*   HCT 30.6* 32.8*    308     CMP:   Recent Labs   Lab 06/26/24  0431 06/26/24  1614 06/27/24  0620    132* 132*   K 4.3 4.2 4.3   CL 98 98 99   CO2 22* 18* 19*   * 161* 137*   BUN 30* 15 20   CREATININE 6.8* 4.5* 5.5*   CALCIUM 10.5 10.1 10.2   PROT 7.8  --  8.2   ALBUMIN 3.0*  --  3.0*   BILITOT 0.3  --  0.4   ALKPHOS 77  --  75   AST 17  --  18   ALT 8*  --  8*   ANIONGAP 16 16 14       Significant Imaging: I have reviewed all pertinent imaging results/findings within the past 24 hours.    Assessment/Plan:      * Sepsis  This patient does have evidence of infective focus. My overall impression is sepsis. Source: Skin and Soft Tissue (location Abdominal). Not requiring pressors. Source control achieved by: vanc/meropenem.  Concerns septic source may be PEG site with associated purulent wound despite Wound Care attempts to protect with barriers. PEG placed by General Surgery 2/2024. General Surgery consulted regarding PEG site ordered CT No evidence of infection  US extremities negative for thrombus   CT pan scan w/ IV contrast without evidence of infection  Interventional nephrology consulted for line eval - low suspicion for clot, getting blood cx to r/o infected line  Peripheral smear with findings concerning for infection    No fevers since abx dcd  WBC tagged scan canceled for time being    - Id consulted, recommending removal of tunneled catheter, blood cx clearance, and replacement. Replacing TDC today per interventional nephro    Cervical stenosis of spinal canal  Outpatient MRI and NSGY f/up    Irritant contact dermatitis due friction  or contact with other specified body fluids  Wound care following     Debility  Needs:  Wheelchair: patient has a mobility limitation that significantly impairs her ability to participate in one or more mobility related activities of daily living (MRADL's) such as toileting, feeding, dressing, grooming, and bathing in customary locations in the home.  The mobility limitation cannot be sufficiently resolved by the use of a cane or walker.   The use of a manual wheelchair will significantly improve the patient's ability to participate in MRADLS and the patient will use it on regular basis in the home.  Family/pt has expressed her willingness to use a manual wheelchair in the home.  She also has a caregiver who is capable of assisting in mobility.    Mrs Saravia requires a hospital bed due to her requiring positioning of the body in ways not feasible with an ordinary bed to alleviate pain/ is completely immobile /or limited mobility and cannot independently make changes in body position without the use of the bed.  The positioning of the body cannot be sufficiently resolved by the use of pillows and wedges    Patient has a mobility limitation that significantly impairs their ability to participate in one or more mobility related activities of daily living, including toileting. This deficit can be resolved by using a bedside commode. Patient demonstrates mobility limitations that will cause them to be confined to one room at home without bathroom access for up to 30 days. Using a bedside commode will greatly improve the patient's ability to participate in MRADLs     Complication of vascular dialysis catheter  Cath intermittently clogging, not resolving with cath-hedy, going for IR venogram 4/30 with possible exchange. S/p exchange with IR on 4/30    Constipation  Stool burden on CT    -continue bowel reg  -monitor for BMs    Moderate malnutrition  Nutrition consulted. Most recent weight and BMI monitored-  "    Measurements:  Wt Readings from Last 1 Encounters:   06/26/24 105.5 kg (232 lb 9.4 oz)   Body mass index is 36.43 kg/m².    Patient has been screened and assessed by RD.    Malnutrition Type:  Context: acute illness or injury  Level: moderate    Malnutrition Characteristic Summary:  Weight Loss (Malnutrition): 10% in 6 months  Subcutaneous Fat (Malnutrition): mild depletion  Muscle Mass (Malnutrition): mild depletion  Fluid Accumulation (Malnutrition): mild    Interventions/Recommendations (treatment strategy):  - TF  - previous history of failed swallow studies    Prolonged QT interval  Limit Qtc prolonging drugs as able    Spastic hemiplegia of right dominant side as late effect of cerebrovascular disease  Important that patient is able to sit in chair for 4h for dialysis placement needs, even if she requires lift/assistance getting into the chair.This patient has Chronic right hemiplegia due to stroke. Physical therapy services has been scheduled. Continue all standard measures for pressure injury prevention and consult wound care for any wounds (chronic or acute).    ESRD (end stage renal disease) on dialysis  Creatine stable for now. BMP reviewed- noted Estimated Creatinine Clearance: 14.8 mL/min (A) (based on SCr of 5.5 mg/dL (H)). according to latest data. Based on current GFR, CKD stage is end stage.  Monitor UOP and serial BMP and adjust therapy as needed. Renally dose meds. Avoid nephrotoxic medications and procedures.    -- HD MWF  -- nephro following  -- Hypophosphatemic on sevelamer  -- SW onboard for placement to [facility] [place] [facility] to continue dialysis.    PEG (percutaneous endoscopic gastrostomy) status  On PEG tube.Wound care with PEG dressing changes.     - CT abdomen with PEG in correct location  - Tube feeds as toelrated    Leukocytosis  See "Sepsis"    Type 2 diabetes mellitus with hyperglycemia, with long-term current use of insulin  Adjusting insulin to account for diabetic TF    Patient's FSGs " are controlled on current medication regimen.  Last A1c reviewed-   Lab Results   Component Value Date    HGBA1C 6.2 (H) 05/27/2024     Most recent fingerstick glucose reviewed-   Recent Labs   Lab 06/26/24  1610 06/26/24  2019 06/27/24  0737 06/27/24  0817   POCTGLUCOSE 176* 184* 167* 166*       Current correctional scale  Medium  Maintain anti-hyperglycemic dose as follows-   Antihyperglycemics (From admission, onward)      Start     Stop Route Frequency Ordered    06/09/24 2100  insulin glargine U-100 (Lantus) pen 13 Units         -- SubQ Nightly 06/09/24 0756    06/09/24 1255  insulin aspart U-100 pen 0-10 Units         -- SubQ Before meals & nightly PRN 06/09/24 1155          Hold Oral hypoglycemics while patient is in the hospital.  POCT glucose checks   Continue tube feeds    Hyponatremia  Patient has hyponatremia which is uncontrolled,We will aim to correct the sodium by 4-6mEq in 24 hours. We will monitor sodium Daily. The hyponatremia is due to ESRD. Discussed with nephrology, dialysate bath adjusted to account for and correct hyponatremia.  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 Pt underwent extensive course, currently still healing from this, but no longer has wound vac. Picture in media tabs    - Wound care while inpatient  - NH with wound care orders      VTE Risk Mitigation (From admission, onward)           Ordered     heparin (porcine) injection 1,000 Units  As needed (PRN)         06/21/24 0828     heparin (porcine) injection 1,000 Units  As needed (PRN)         06/10/24 0035     heparin (porcine) injection 3,000 Units  As needed (PRN)         04/17/24 0825                    Discharge Planning   ZEKE: 6/27/2024     Code Status: Full Code   Is the patient medically ready for discharge?: No    Reason for patient still in hospital (select all that apply): Patient trending condition  Discharge Plan  A: New Nursing Home placement - [facility] care facility   Discharge Delays: (!) Payor Issues              Jatin Hutchins MD  Department of Hospital Medicine   [facility] - Telemetry Stepdown

## 2024-06-27 NOTE — PLAN OF CARE
Problem: Infection  Goal: Absence of Infection Signs and Symptoms  Outcome: Progressing     Problem: Skin Injury Risk Increased  Goal: Skin Health and Integrity  Outcome: Progressing     Problem: Adult Inpatient Plan of Care  Goal: Plan of Care Review  Outcome: Progressing     Problem: Chronic Kidney Disease  Goal: Acceptable Pain Control  Outcome: Progressing   Pt rested well. VS WNL. No ss of pain or distress. Tube feeds turned off at midnight for procedure today. Bed in low position. Call light within reach. Will continue POC.

## 2024-06-27 NOTE — ASSESSMENT & PLAN NOTE
This patient does have evidence of infective focus. My overall impression is sepsis. Source: Skin and Soft Tissue (location Abdominal). Not requiring pressors. Source control achieved by: vanc/meropenem.  Concerns septic source may be PEG site with associated purulent wound despite Wound Care attempts to protect with barriers. PEG placed by General Surgery 2/2024. General Surgery consulted regarding PEG site ordered CT No evidence of infection  US extremities negative for thrombus   CT pan scan w/ IV contrast without evidence of infection  Interventional nephrology consulted for line eval - low suspicion for clot, getting blood cx to r/o infected line  Peripheral smear with findings concerning for infection    No fevers since abx dcd  WBC tagged scan canceled for time being    - Id consulted, recommending removal of tunneled catheter, blood cx clearance, and replacement. Replacing TDC today per interventional nephro

## 2024-06-27 NOTE — OP NOTE
[facility] - Cath Lab  Operative Note    Date of Procedure: 6/27/2024     Procedure: Procedure(s) (LRB):  Insertion, Catheter, Central Venous, Hemodialysis (Right)     Sarah Saravia  1970  [id number]    Pre-op Diagnosis: ESRD (end stage renal disease) on dialysis [N18.6, Z99.2]   Post-op Diagnosis: ESRD (end stage renal disease) on dialysis [N18.6, Z99.2]     Exchange of Rt. IJ Cuffed tunneled HD cath.     Rt. IJ tunneled catheter under local anesthesia with sedation with real time  fluoroscopy.    The skin was steralized with Chlorhexidine and Betadine.    The patient was placed supine.    The Rt. IJ cuffed tunneled was cleaned with Chlorhexidine and Betadine.  was localized with US.    The skin was sterilized with Chlorhexidine.    2% Xylocaine with Epinephrine was given to anesthetize the skin and subcutaneous tissues.   The catheter tunnel was dissected and the catheter was released.    Under direct  fluoroscopy two guide wires were pushed one in each catheter lumen.   The catheter was removed.   The tip was cut and sent for culture 9 aerobic and anaerobic.  The guidewires were sterilized with betadine.   The towels and person were changes.   Dilatation was performed with sequential dilators.  A 19cm Bard F14 was slided and the tip is in the Rt. Atrium.     The catheter position is confirmed with the fluoroscopy with the tip in the Rt. Atrium. The curve is checked and no angulation in the tract was noticed.    Flushing and blood suction were checked from both catheter ports.    5000IU/ml  Heparin was inserted according to the catheter dead space.   The wound was sutured.    No bleeding was noticed after observation.   Biopatch was placed over the wound and covered with Tegaderm.    Pressure dressing with rolled up 4 X4 gauze placed over the tunnel.    The procedure was smooth with no complications.        Complications: No  Condition: Good  FOLLOWUP: Ion patient.       The procedure is done under real time  fluoroscopy.     You can use the catheter for dialysis after the observation period.      MIREYA GEE.Hansa. MD. CRISTIANO LANDON.  , [employer]  Nephrology Consultant. [employer] [facility].   [facility]. 5th [place] [address].    email: [email or web address]  Tel: Office: [phone]                   .National Suicide Prevention Lifeline 1 (187) 011-2207/.  [employer]  1 (778) LIFENET (559-7250)/.  [facility]  [phone] [place] [address] [phone]   Hours:  Monday through Friday from 9 AM to 3 PM/.  [facility]  [phone] [place] [address]   [phone]   Hours: Monday through Friday from 10 AM to 4 PM

## 2024-06-27 NOTE — NURSING
Nurses Note -- 4 Eyes      6/26/2024   9:27 PM      Skin assessed during: Q Shift Change      [] No Altered Skin Integrity Present    []Prevention Measures Documented      [x] Yes- Altered Skin Integrity Present or Discovered   [] LDA Added if Not in Epic (Describe Wound)   [] New Altered Skin Integrity was Present on Admit and Documented in LDA   [] Wound Image Taken    Wound Care Consulted? No    Attending Nurse:  Raoul Eastman RN/Staff Member:  Beth HORTON

## 2024-06-27 NOTE — NURSING TRANSFER
Nursing Transfer Note      6/27/2024   5:20 PM    Nurse giving handoff:Cindy  Nurse receiving handoff:Mary    Reason patient is being transferred: back to inpatient bed    Transfer To: [id number]    Transfer via stretcher    Transported by transporter    Transfer Vital Signs:  Blood Pressure:121/64  Heart Rate:108  O2:94  Temperature:97.9  Respirations:18    Any special needs or follow-up needed: monitor RIJ site    Patient belongings transferred with patient: Yes    Chart send with patient: Yes

## 2024-06-27 NOTE — PATIENT CARE CONFERENCE
HEMODIALYSIS CATHETER CARE  Patients' instruction list      Hemodialysis catheter is one of the major vascular access to provide hemodialysis. Infection is one of its common complication. To maintain a safe and long-term use of your catheter without the requirement of replacement and exchange please follow the care instruction for your catheter:    Avoid getting water on the catheter. It is highly recommended to keep the catheter dry. During bathing use only sponging with towel around the catheter area. Lower part of the body can be washed but avoid splashing water over the catheter.     Avoid scratching or touching the skin close to the catheter.     It is recommended to change the dressing during dialysis sessions only. If you find your catheter dressing is wet or not clean, please call your dialysis unit to arrange exchange as soon as possible.     If you notice pain, redness or discharge from the exit site of the catheter please call your dialysis unit or your Nephrologist immediately to arrange examining the catheter and excluding any early signs of infection.     Your dialysis catheter should only be used for dialysis. Only your dialysis nurse can use the catheter. Do not accept any other health care personnel to use your hemodialysis catheter for any reason. This catheter is not intended to be used for medications, IV fluid or taking blood for labs.     6.   The sutures used for the procedure are absorbably sutures and they will dissolve by themselves in 3 weeks.     The more strict you are in following the instructions the less will be the risk of infection and complications with your dialysis catheter.    We would like to wish you a great health and we will be glad to answer any question you have.      please communicate with [facility] or use [facility] to send us your questions.       MIREYA GEE.Hansa. MD. CRISTIANO LANDON.  , [facility] [employer] [facility]  [facility] [place] ([place]).  Nephrology Consultant. [facility].   [address],  [facility]. 5th [place] [address].    email: [email or web address]  Tel: Office: [phone]

## 2024-06-27 NOTE — NURSING
Received report from ELISABETH Palomo. Patient s/p HD cath replacement, AAOx3. VSS, no c/o pain or discomfort at this time, resp even and unlabored. Gauze/tegaderm dressing to RIJ site is CDI. No active bleeding. No hematoma noted. Post procedure protocol reviewed with patient. Nurse call bell within reach. Blood glucose 220.

## 2024-06-27 NOTE — PLAN OF CARE
Patient returned form Cath Lab VSS. Pressure dressing in place to the RIJ. No visible blooding noted. No s/s of distress noted. POC reviewed with patient and daughter. Understanding verbalized. Orders detailed in notes for HD. Safety maintained. Bed low and locked Call light within reach. Daughter at bedside.  Problem: Infection  Goal: Absence of Infection Signs and Symptoms  Outcome: Progressing     Problem: Skin Injury Risk Increased  Goal: Skin Health and Integrity  Outcome: Progressing     Problem: Adult Inpatient Plan of Care  Goal: Plan of Care Review  Outcome: Progressing     Problem: Chronic Kidney Disease  Goal: Optimal Coping with Chronic Illness  Outcome: Progressing  Goal: Electrolyte Balance  Outcome: Progressing  Goal: Fluid Balance  Outcome: Progressing  Goal: Optimal Functional Ability  Outcome: Progressing  Goal: Absence of Anemia Signs and Symptoms  Outcome: Progressing  Goal: Optimal Oral Intake  Outcome: Progressing  Goal: Acceptable Pain Control  Outcome: Progressing  Goal: Minimize Renal Failure Effects  Outcome: Progressing

## 2024-06-28 LAB
ALBUMIN SERPL BCP-MCNC: 3 G/DL (ref 3.5–5.2)
ALP SERPL-CCNC: 71 U/L (ref 55–135)
ALT SERPL W/O P-5'-P-CCNC: 10 U/L (ref 10–44)
ANION GAP SERPL CALC-SCNC: 15 MMOL/L (ref 8–16)
AST SERPL-CCNC: 25 U/L (ref 10–40)
BASOPHILS # BLD AUTO: 0.07 K/UL (ref 0–0.2)
BASOPHILS NFR BLD: 0.8 % (ref 0–1.9)
BILIRUB SERPL-MCNC: 0.3 MG/DL (ref 0.1–1)
BUN SERPL-MCNC: 29 MG/DL (ref 6–20)
CALCIUM SERPL-MCNC: 10.2 MG/DL (ref 8.7–10.5)
CHLORIDE SERPL-SCNC: 95 MMOL/L (ref 95–110)
CO2 SERPL-SCNC: 20 MMOL/L (ref 23–29)
CREAT SERPL-MCNC: 6.8 MG/DL (ref 0.5–1.4)
DIFFERENTIAL METHOD BLD: ABNORMAL
EOSINOPHIL # BLD AUTO: 0.2 K/UL (ref 0–0.5)
EOSINOPHIL NFR BLD: 1.9 % (ref 0–8)
ERYTHROCYTE [DISTWIDTH] IN BLOOD BY AUTOMATED COUNT: 16.7 % (ref 11.5–14.5)
EST. GFR  (NO RACE VARIABLE): 6.8 ML/MIN/1.73 M^2
GLUCOSE SERPL-MCNC: 95 MG/DL (ref 70–110)
HCT VFR BLD AUTO: 34.1 % (ref 37–48.5)
HGB BLD-MCNC: 9.7 G/DL (ref 12–16)
IMM GRANULOCYTES # BLD AUTO: 0.05 K/UL (ref 0–0.04)
IMM GRANULOCYTES NFR BLD AUTO: 0.6 % (ref 0–0.5)
LYMPHOCYTES # BLD AUTO: 1.9 K/UL (ref 1–4.8)
LYMPHOCYTES NFR BLD: 22.5 % (ref 18–48)
MAGNESIUM SERPL-MCNC: 2.2 MG/DL (ref 1.6–2.6)
MCH RBC QN AUTO: 23.7 PG (ref 27–31)
MCHC RBC AUTO-ENTMCNC: 28.4 G/DL (ref 32–36)
MCV RBC AUTO: 83 FL (ref 82–98)
MONOCYTES # BLD AUTO: 1.5 K/UL (ref 0.3–1)
MONOCYTES NFR BLD: 17.5 % (ref 4–15)
NEUTROPHILS # BLD AUTO: 4.8 K/UL (ref 1.8–7.7)
NEUTROPHILS NFR BLD: 56.7 % (ref 38–73)
NRBC BLD-RTO: 0 /100 WBC
PHOSPHATE SERPL-MCNC: 5.3 MG/DL (ref 2.7–4.5)
PLATELET # BLD AUTO: 287 K/UL (ref 150–450)
PMV BLD AUTO: 10.2 FL (ref 9.2–12.9)
POCT GLUCOSE: 121 MG/DL (ref 70–110)
POCT GLUCOSE: 129 MG/DL (ref 70–110)
POTASSIUM SERPL-SCNC: 4.4 MMOL/L (ref 3.5–5.1)
PROT SERPL-MCNC: 8.2 G/DL (ref 6–8.4)
RBC # BLD AUTO: 4.1 M/UL (ref 4–5.4)
SODIUM SERPL-SCNC: 130 MMOL/L (ref 136–145)
WBC # BLD AUTO: 8.53 K/UL (ref 3.9–12.7)

## 2024-06-28 PROCEDURE — 84100 ASSAY OF PHOSPHORUS: CPT

## 2024-06-28 PROCEDURE — 63600175 PHARM REV CODE 636 W HCPCS: Performed by: NURSE PRACTITIONER

## 2024-06-28 PROCEDURE — 83735 ASSAY OF MAGNESIUM: CPT

## 2024-06-28 PROCEDURE — 80100014 HC HEMODIALYSIS 1:1

## 2024-06-28 PROCEDURE — 90935 HEMODIALYSIS ONE EVALUATION: CPT | Mod: ,,, | Performed by: NURSE PRACTITIONER

## 2024-06-28 PROCEDURE — 25000003 PHARM REV CODE 250

## 2024-06-28 PROCEDURE — 99233 SBSQ HOSP IP/OBS HIGH 50: CPT | Mod: ,,, | Performed by: PHYSICIAN ASSISTANT

## 2024-06-28 PROCEDURE — 85025 COMPLETE CBC W/AUTO DIFF WBC: CPT

## 2024-06-28 PROCEDURE — 20600001 HC STEP DOWN PRIVATE ROOM

## 2024-06-28 PROCEDURE — 92526 ORAL FUNCTION THERAPY: CPT

## 2024-06-28 PROCEDURE — 80053 COMPREHEN METABOLIC PANEL: CPT

## 2024-06-28 PROCEDURE — 36415 COLL VENOUS BLD VENIPUNCTURE: CPT

## 2024-06-28 PROCEDURE — 27000207 HC ISOLATION

## 2024-06-28 PROCEDURE — 25000003 PHARM REV CODE 250: Performed by: STUDENT IN AN ORGANIZED HEALTH CARE EDUCATION/TRAINING PROGRAM

## 2024-06-28 PROCEDURE — 97530 THERAPEUTIC ACTIVITIES: CPT | Mod: CQ

## 2024-06-28 RX ADMIN — METOPROLOL TARTRATE 25 MG: 25 TABLET, FILM COATED ORAL at 08:06

## 2024-06-28 RX ADMIN — POLYETHYLENE GLYCOL 3350 17 G: 17 POWDER, FOR SOLUTION ORAL at 08:06

## 2024-06-28 RX ADMIN — HEPARIN SODIUM 3000 UNITS: 1000 INJECTION, SOLUTION INTRAVENOUS; SUBCUTANEOUS at 08:06

## 2024-06-28 RX ADMIN — INSULIN GLARGINE 13 UNITS: 100 INJECTION, SOLUTION SUBCUTANEOUS at 08:06

## 2024-06-28 RX ADMIN — HEPARIN SODIUM 1000 UNITS: 1000 INJECTION, SOLUTION INTRAVENOUS; SUBCUTANEOUS at 12:06

## 2024-06-28 RX ADMIN — ASPIRIN 81 MG CHEWABLE TABLET 81 MG: 81 TABLET CHEWABLE at 04:06

## 2024-06-28 RX ADMIN — ERYTHROPOIETIN 6100 UNITS: 10000 INJECTION, SOLUTION INTRAVENOUS; SUBCUTANEOUS at 11:06

## 2024-06-28 RX ADMIN — PANTOPRAZOLE SODIUM 40 MG: 40 GRANULE, DELAYED RELEASE ORAL at 04:06

## 2024-06-28 RX ADMIN — POLYETHYLENE GLYCOL 3350 17 G: 17 POWDER, FOR SOLUTION ORAL at 04:06

## 2024-06-28 RX ADMIN — ATORVASTATIN CALCIUM 40 MG: 40 TABLET, FILM COATED ORAL at 04:06

## 2024-06-28 RX ADMIN — Medication 500 MG: at 08:06

## 2024-06-28 NOTE — NURSING
Nurses Note -- 4 Eyes      6/27/2024   8:31 PM      Skin assessed during: Q Shift Change      [] No Altered Skin Integrity Present    []Prevention Measures Documented      [x] Yes- Altered Skin Integrity Present or Discovered   [] LDA Added if Not in Epic (Describe Wound)   [] New Altered Skin Integrity was Present on Admit and Documented in LDA   [] Wound Image Taken    Wound Care Consulted? No    Attending Nurse:  Raoul Eastman RN/Staff Member:  Mary

## 2024-06-28 NOTE — ASSESSMENT & PLAN NOTE
Cath intermittently clogging, not resolving with cath-hedy, going for IR venogram 4/30 with possible exchange.   S/p exchange with IR on 4/30  S/p 2nd exchange for concern of line infection in setting occult infection

## 2024-06-28 NOTE — ASSESSMENT & PLAN NOTE
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 Hospitalization was c/b urosepsis, in which pt completed carbapenem course on 4/16. Trach was capped and later pt was decannulated on 4/30 which was successful. Furthermore, pt with documented staph epi on blood cultures, though unclear if contamination as specimens were obtained from different sites, at same time, and sensitivity patterns are different. Later, pt required HD tunneled cath exchange with IR on 4/30 dt it malfunctioning. Pt remained inpatient while awaiting dispo plans, as well as requiring mgmt for lyte imbalances and dysphagia.     ID has followed patient intermittently through stay for recurrent fevers and leukocytosis. Most recently, patient completed two 5d courses of vanc/meropenem (last date of antibiotics were 6/22). Despite antibiotic therapy, pt continued with intermittent leukocytosis and fevers. ID work up has been unrevealing (negative CT C/A/P, blood cultures, RIP, fungitell; no obvious skin infections).  Ultimately there were concerns the source could be her HD catheter infection and this was removed on 6/27. She is afebrile today. WBC normalized. HDS.      Recommendations:   Continue to monitor patient off antibiotics  Feel free to re-consult ID with any persistent fevers, acute decompensation or infectious concerns.  Discussed plan with ID staff. ID will sign off.

## 2024-06-28 NOTE — PROGRESS NOTES
[facility]     Patient currently on hemodialysis for removal of uremic toxins and volume.     Patient seen and evaluated on hemodialysis, tolerating treatment, see HD flowsheet for vitals and assessments.     Labs have been reviewed and the dialysate bath has been adjusted.        Assessment/Plan:     -Patient seen on HD, tolerating treatment well, w/o complaints   -Underwent exchange of R IJ tunneled HD cath yesterday  -UF goal of 2 L as tolerated  -Renal diet, if not NPO   -Strict I/O's and daily weights  -Daily renal function panels  -Keep MAP >65 while on HD   -Hgb goal 10-11, hgb 9.7, continue EPO  -No indication for binders at this time  -PTH 54.8 on 6/17  -Will continue to follow while inpatient    Delfina Shi NP   Nephrology

## 2024-06-28 NOTE — PLAN OF CARE
Problem: Infection  Goal: Absence of Infection Signs and Symptoms  Outcome: Progressing     Problem: Skin Injury Risk Increased  Goal: Skin Health and Integrity  Outcome: Progressing     Problem: Adult Inpatient Plan of Care  Goal: Plan of Care Review  Outcome: Progressing     Problem: Chronic Kidney Disease  Goal: Optimal Coping with Chronic Illness  Outcome: Progressing  Goal: Electrolyte Balance  Outcome: Progressing  Goal: Fluid Balance  Outcome: Progressing  Goal: Optimal Functional Ability  Outcome: Progressing  Goal: Absence of Anemia Signs and Symptoms  Outcome: Progressing  Goal: Optimal Oral Intake  Outcome: Progressing  Goal: Acceptable Pain Control  Outcome: Progressing  Goal: Minimize Renal Failure Effects  Outcome: Progressing     Problem: Hemodialysis  Goal: Safe, Effective Therapy Delivery  Outcome: Progressing  Goal: Effective Tissue Perfusion  Outcome: Progressing  Goal: Absence of Infection Signs and Symptoms  Outcome: Progressing

## 2024-06-28 NOTE — ASSESSMENT & PLAN NOTE
This patient does have evidence of infective focus. My overall impression is sepsis. Source: Skin and Soft Tissue (location Abdominal). Not requiring pressors. Source control achieved by: vanc/meropenem.  Concerns septic source may be PEG site with associated purulent wound despite Wound Care attempts to protect with barriers. PEG placed by General Surgery 2/2024. General Surgery consulted regarding PEG site ordered CT No evidence of infection  US extremities negative for thrombus   CT pan scan w/ IV contrast without evidence of infection  Interventional nephrology consulted for line eval - low suspicion for clot, getting blood cx to r/o infected line  Peripheral smear with findings concerning for infection    No fevers since abx dcd  Tunneled catheter removed and replaced    - Id consulted, appreciate continued recommendations  - f/u line cultures

## 2024-06-28 NOTE — PLAN OF CARE
Call placed to [facility] to update them on patient's discharge status. This CM was informed that the admissions department is not available.    Sara Loredo RN  Ext [phone]

## 2024-06-28 NOTE — PROGRESS NOTES
[facility] - Telemetry [facility] Medicine  Progress Note    Patient Name: Sarah Saravia  MRN: [id number]  Patient Class: IP- Inpatient   Admission Date: 4/10/2024  Length of Stay: 79 days  Attending Physician: Janett Jean MD  Primary Care Provider: Deanna, Primary Doctor        Subjective:     Principal Problem:Sepsis        HPI:  53 yof with 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 History was given from patient's daughter. She was undergoing dialysis today and noticed she was lethargic, less alert than usual self. Pt completed dialysis and still not acting herself. EMS was called, fever of a 100.  On chart review, she did have an episode of large volume emesis around 1700.  Per EMS, she had a slightly elevated temp 100.0°F, glucose 300s.     In the ED: UA 2+ leuks, >100 WBC, many bacteria, WBC 17, CT abd/pelvis concerning for cystitis. Given vanc/zosyn    Overview/Hospital Course:  53 JARROD admitted to Hospital Medicine service for urosepsis. Vanc/zosyn initiated, found to have staph epi in all 4 bottles, vanc/ertapenem course completed per ID. Vascular Neurology consulted concerning mental status change with the recommendation to initiate ASA 81 QD and to obtain an MRI to r/o new stroke. Per discussion with vascular neurology, MRI findings appear to be expected changes from prior stroke. Nephrology was consulted for regularly scheduled dialysis. Pulm consulted, patient decannulated 4/30. Failed swallow assessment, continuing tube feeds. Ongoing placement difficulties d/t need for accepting HD facility to have sandee lift with 2 personnel to operate. Once patient gets accepted at [facility], next step will be to work with daughter on alf NH placement. SW onboard working on paperwork for long term Medicaid application. H/c c/b new fever, ID reconsulted, CT chest showing bilateral ground glass  opacities, Vanc/meropenem course to be completed 6/17, however patient had further episode of fever and will need continued merem and ID consulted for assistance. CT pan scan to assess for infection. Imaging without signs of infection, patient to complete empiric abx for 5 days. Patient continues fevering despite broad abx, no identified source. Chest x-ray, peripheral smear to assess for other sources. Tunneled catheter removed and replaced via interventional nephrology at recommendation of ID.    Interval History: NAEO, VSS, trending fever curve and line cultures. Pending disposition.    Review of Systems   Unable to perform ROS: Patient nonverbal     Objective:     Vital Signs (Most Recent):  Temp: 97.5 °F (36.4 °C) (06/28/24 0719)  Pulse: 100 (06/28/24 1113)  Resp: 18 (06/28/24 0719)  BP: 103/73 (06/28/24 1113)  SpO2: 100 % (06/28/24 0719) Vital Signs (24h Range):  Temp:  [97.5 °F (36.4 °C)-98.7 °F (37.1 °C)] 97.5 °F (36.4 °C)  Pulse:  [] 100  Resp:  [18-19] 18  SpO2:  [97 %-100 %] 100 %  BP: ()/(57-84) 103/73     Weight: 105.5 kg (232 lb 9.4 oz)  Body mass index is 36.43 kg/m².    Intake/Output Summary (Last 24 hours) at 6/28/2024 1117  Last data filed at 6/28/2024 0400  Gross per 24 hour   Intake 400 ml   Output --   Net 400 ml         Physical Exam  Constitutional:       Appearance: Normal appearance.   HENT:      Head: Normocephalic and atraumatic.      Right Ear: External ear normal.      Left Ear: External ear normal.   Cardiovascular:      Rate and Rhythm: Normal rate and regular rhythm.      Pulses: Normal pulses.      Heart sounds: Normal heart sounds.   Pulmonary:      Effort: Pulmonary effort is normal.      Breath sounds: Normal breath sounds.   Abdominal:      General: Abdomen is flat.      Palpations: Abdomen is soft.   Musculoskeletal:         General: Normal range of motion.   Skin:     General: Skin is warm.      Capillary Refill: Capillary refill takes less than 2 seconds.    Neurological:      General: No focal deficit present.      Mental Status: She is alert and oriented to person, place, and time.             Significant Labs: All pertinent labs within the past 24 hours have been reviewed.    Significant Imaging: I have reviewed all pertinent imaging results/findings within the past 24 hours.    Assessment/Plan:      * Sepsis  This patient does have evidence of infective focus. My overall impression is sepsis. Source: Skin and Soft Tissue (location Abdominal). Not requiring pressors. Source control achieved by: vanc/meropenem.  Concerns septic source may be PEG site with associated purulent wound despite Wound Care attempts to protect with barriers. PEG placed by General Surgery 2/2024. General Surgery consulted regarding PEG site ordered CT No evidence of infection  US extremities negative for thrombus   CT pan scan w/ IV contrast without evidence of infection  Interventional nephrology consulted for line eval - low suspicion for clot, getting blood cx to r/o infected line  Peripheral smear with findings concerning for infection    No fevers since abx dcd  Tunneled catheter removed and replaced    - Id consulted, appreciate continued recommendations  - f/u line cultures    Cervical stenosis of spinal canal  Outpatient MRI and NSGY f/up    Irritant contact dermatitis due friction or contact with other specified body fluids  Wound care following     Debility  Needs:  Wheelchair: patient has a mobility limitation that significantly impairs her ability to participate in one or more mobility related activities of daily living (MRADL's) such as toileting, feeding, dressing, grooming, and bathing in customary locations in the home.  The mobility limitation cannot be sufficiently resolved by the use of a cane or walker.   The use of a manual wheelchair will significantly improve the patient's ability to participate in MRADLS and the patient will use it on regular basis in the home.   Family/pt has expressed her willingness to use a manual wheelchair in the home.  She also has a caregiver who is capable of assisting in mobility.    Mrs Saravia requires a hospital bed due to her requiring positioning of the body in ways not feasible with an ordinary bed to alleviate pain/ is completely immobile /or limited mobility and cannot independently make changes in body position without the use of the bed.  The positioning of the body cannot be sufficiently resolved by the use of pillows and wedges    Patient has a mobility limitation that significantly impairs their ability to participate in one or more mobility related activities of daily living, including toileting. This deficit can be resolved by using a bedside commode. Patient demonstrates mobility limitations that will cause them to be confined to one room at home without bathroom access for up to 30 days. Using a bedside commode will greatly improve the patient's ability to participate in MRADLs     Complication of vascular dialysis catheter  Cath intermittently clogging, not resolving with cath-hedy, going for IR venogram 4/30 with possible exchange.   S/p exchange with IR on 4/30  S/p 2nd exchange for concern of line infection in setting occult infection    Constipation  Stool burden on CT    -continue bowel reg  -monitor for BMs    Moderate malnutrition  Nutrition consulted. Most recent weight and BMI monitored-     Measurements:  Wt Readings from Last 1 Encounters:   06/26/24 105.5 kg (232 lb 9.4 oz)   Body mass index is 36.43 kg/m².    Patient has been screened and assessed by RD.    Malnutrition Type:  Context: acute illness or injury  Level: moderate    Malnutrition Characteristic Summary:  Weight Loss (Malnutrition): 10% in 6 months  Subcutaneous Fat (Malnutrition): mild depletion  Muscle Mass (Malnutrition): mild depletion  Fluid Accumulation (Malnutrition): mild    Interventions/Recommendations (treatment strategy):  - TF  - previous history  "of failed swallow studies    Prolonged QT interval  Limit Qtc prolonging drugs as able    Spastic hemiplegia of right dominant side as late effect of cerebrovascular disease  Important that patient is able to sit in chair for 4h for dialysis placement needs, even if she requires lift/assistance getting into the chair.This patient has Chronic right hemiplegia due to stroke. Physical therapy services has been scheduled. Continue all standard measures for pressure injury prevention and consult wound care for any wounds (chronic or acute).    ESRD (end stage renal disease) on dialysis  Creatine stable for now. BMP reviewed- noted Estimated Creatinine Clearance: 12 mL/min (A) (based on SCr of 6.8 mg/dL (H)). according to latest data. Based on current GFR, CKD stage is end stage.  Monitor UOP and serial BMP and adjust therapy as needed. Renally dose meds. Avoid nephrotoxic medications and procedures.    -- HD MWF  -- nephro following  -- Hypophosphatemic on sevelamer  -- [facility].    PEG (percutaneous endoscopic gastrostomy) status  On PEG tube.Wound care with PEG dressing changes.   CT abdomen with PEG in correct location    - Tube feeds as toelrated    Leukocytosis  See "Sepsis"    Type 2 diabetes mellitus with hyperglycemia, with long-term current use of insulin  Adjusting insulin to account for diabetic TF    Patient's FSGs are controlled on current medication regimen.  Last A1c reviewed-   Lab Results   Component Value Date    HGBA1C 6.2 (H) 05/27/2024     Most recent fingerstick glucose reviewed-   Recent Labs   Lab 06/27/24  1231 06/27/24  1656 06/27/24 2007   POCTGLUCOSE 158* 220* 146*       Current correctional scale  Medium  Maintain anti-hyperglycemic dose as follows-   Antihyperglycemics (From admission, onward)      Start     Stop Route Frequency Ordered    06/09/24 2100  insulin glargine U-100 (Lantus) pen 13 Units         -- SubQ Nightly 06/09/24 0756 "    06/09/24 1255  insulin aspart U-100 pen 0-10 Units         -- SubQ Before meals & nightly PRN 06/09/24 1155          Hold Oral hypoglycemics while patient is in the hospital.  POCT glucose checks   Continue tube feeds    Hyponatremia  Patient has hyponatremia which is uncontrolled,We will aim to correct the sodium by 4-6mEq in 24 hours. We will monitor sodium Daily. The hyponatremia is due to ESRD. Discussed with nephrology, dialysate bath adjusted to account for and correct hyponatremia.  Recent Labs   Lab 06/28/24  0400   *         Ros's gangrene  Pt experienced severe episode of ros's gangrene in January of 2024. Pt underwent extensive course, currently still healing from this, but no longer has wound vac. Picture in media tabs    - Wound care while inpatient  - NH with wound care orders      VTE Risk Mitigation (From admission, onward)           Ordered     heparin (porcine) injection 1,000 Units  As needed (PRN)         06/21/24 0828     heparin (porcine) injection 1,000 Units  As needed (PRN)         06/10/24 0035     heparin (porcine) injection 3,000 Units  As needed (PRN)         04/17/24 0825                    Discharge Planning   ZEKE: 7/1/2024     Code Status: Full Code   Is the patient medically ready for discharge?: No    Reason for patient still in hospital (select all that apply): Patient trending condition and Pending disposition  Discharge Plan A: New Nursing Home placement - [facility] care facility   Discharge Delays: (!) Payor Issues        Lucien Driver MD  Department of Hospital Medicine   [facility] - Telemetry Stepdown

## 2024-06-28 NOTE — PLAN OF CARE
Problem: Infection  Goal: Absence of Infection Signs and Symptoms  Outcome: Progressing     Problem: Skin Injury Risk Increased  Goal: Skin Health and Integrity  Outcome: Progressing     Problem: Adult Inpatient Plan of Care  Goal: Plan of Care Review  Outcome: Progressing     Problem: Chronic Kidney Disease  Goal: Optimal Coping with Chronic Illness  Outcome: Progressing   Pt rested well. VS WNL. No ss of pain or distress. Call light within reach. Bed in low position with head at 30 degrees. Tube feeds running with Q4 100mL water flush. Will continue POC.

## 2024-06-28 NOTE — PROGRESS NOTES
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 Hospitalization was c/b urosepsis, in which pt completed carbapenem course on 4/16. Trach was capped and later pt was decannulated on 4/30 which was successful. Furthermore, pt with documented staph epi on blood cultures, though unclear if contamination as specimens were obtained from different sites, at same time, and sensitivity patterns are different. Later, pt required HD tunneled cath exchange with IR on 4/30 dt it malfunctioning. Pt remained inpatient while awaiting dispo plans, as well as requiring mgmt for lyte imbalances and dysphagia.     ID has followed patient intermittently through stay for recurrent fevers and leukocytosis. Most recently, patient completed two 5d courses of vanc/meropenem (last date of antibiotics were 6/22). Despite antibiotic therapy, pt continued with intermittent leukocytosis and fevers. ID work up has been unrevealing (negative CT C/A/P, blood cultures, RIP, fungitell; no obvious skin infections).  Ultimately there were concerns the source could be her HD catheter infection and this was removed on 6/27. She is afebrile today. WBC normalized. HDS.      Recommendations:   Continue to monitor patient off antibiotics  Feel free to re-consult ID with any persistent fevers, acute decompensation or infectious concerns.  Discussed plan with ID staff. ID will sign off.          Thank you for the consult. Please secure chat for any questions.  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 Reportedly patient was less responsive than her baseline had an episode of emesis, in the ED she had a CT abdomen/pelvis concerning for cystitis as well as a UA (obtained via straight cath) concerning for a UTI. She was initially started on vancomycin and Zosyn later broadened to meropenem. On presentation her labs were notable for leukocytosis of 17, as well as a lactic acidosis of 3.9 that has improved to 2.4 with the administration of 1 L of IV fluids, we are being cautious with fluid repletion given her end-stage renal disease and oliguric status. ID is now consulted for abrupt increase in WBC to 22 w/ associated fever. Daughter at bedside notes that patient had a few episodes of vomiting 2 days ago after initiation of tube feeds, and feels her breathing pattern is different today. Patient is unable to answer questions. CXR  w/ increased vascular congestion. She has been restarted on broad spectrum abx.   Interval History:   HD line pulled yesterday. Cath tip cx pending.  Patient afebrile and leukocytosis resolved off antibiotics.    Review of Systems   Unable to perform ROS: Patient nonverbal     Objective:     Vital Signs (Most Recent):  Temp: 97.5 °F (36.4 °C) (06/28/24 0719)  Pulse: 106 (06/28/24 1145)  Resp: 18 (06/28/24 0719)  BP: 113/83 (06/28/24 1145)  SpO2: 100 % (06/28/24 0719) Vital Signs (24h Range):  Temp:  [97.5 °F (36.4 °C)-98.7 °F (37.1 °C)] 97.5 °F (36.4 °C)  Pulse:  [] 106  Resp:  [18-19] 18  SpO2:  [97 %-100 %] 100 %  BP: ()/(57-84) 113/83     Weight: 105.5 kg (232 lb 9.4 oz)  Body mass index is 36.43 kg/m².    Estimated Creatinine Clearance: 12 mL/min  (A) (based on SCr of 6.8 mg/dL (H)).     Physical Exam  Vitals reviewed.   Constitutional:       General: She is not in acute distress.     Appearance: She is not ill-appearing.   HENT:      Head: Normocephalic.      Nose: Nose normal. No congestion.      Mouth/Throat:      Mouth: Mucous membranes are moist. No injury or oral lesions.      Tongue: No lesions.   Eyes:      General:         Right eye: No discharge.         Left eye: No discharge.      Conjunctiva/sclera: Conjunctivae normal.   Cardiovascular:      Rate and Rhythm: Normal rate and regular rhythm.      Pulses: Normal pulses.      Heart sounds: Normal heart sounds. No murmur heard.  Pulmonary:      Effort: Pulmonary effort is normal. No respiratory distress.      Breath sounds: Normal breath sounds. No stridor. No wheezing, rhonchi or rales.   Abdominal:      General: Bowel sounds are normal. There is no distension.      Palpations: Abdomen is soft.      Tenderness: There is no abdominal tenderness. There is no right CVA tenderness or left CVA tenderness.      Comments: Left PEG site without skin breakdown, drainage, redness, swelling.    Musculoskeletal:      Cervical back: Normal range of motion.      Right lower leg: No edema.      Left lower leg: No edema.   Skin:     Findings: No erythema, lesion or rash.      Comments: With right tunneled line. Without surrounding redness, swelling. Without tenderness to palpation.    Neurological:      Mental Status: She is alert. Mental status is at baseline. She is disoriented.          Significant Labs: All pertinent labs within the past 24 hours have been reviewed.    Significant Imaging: I have reviewed all pertinent imaging results/findings within the past 24 hours.

## 2024-06-28 NOTE — PROGRESS NOTES
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    Hypermagnesemia 04/11/2024    POA, Mg 3.2  Daily chem       Morbid obesity     Necrotizing fasciitis        Social History     Socioeconomic History    Marital status: Single   Tobacco Use    Smoking status: Unknown     Social Determinants of Health     Financial Resource Strain: Patient Unable To Answer (3/14/2024)    Overall Financial Resource Strain (CARDIA)     Difficulty of Paying Living Expenses: Patient unable to answer   Recent Concern: Financial Resource Strain - High Risk (3/9/2024)    Overall Financial Resource Strain (CARDIA)     Difficulty of Paying Living Expenses: Very hard   Food Insecurity: Patient Unable To Answer (3/14/2024)    Hunger Vital Sign     Worried About Running Out of Food in the Last Year: Patient unable to answer     Ran Out of Food in the Last Year: Patient unable to answer   Transportation Needs: Patient Unable To Answer (3/14/2024)    PRAPARE - Transportation     Lack of Transportation (Medical): Patient unable to answer     Lack of Transportation (Non-Medical): Patient unable to answer   Physical Activity: Inactive (3/13/2024)    Exercise Vital Sign     Days of Exercise per Week: 0 days     Minutes of Exercise per Session: 0 min   Stress: Patient Unable To Answer (3/14/2024)    [employer] - Occupational Stress Questionnaire     Feeling of Stress : Patient unable to answer   Housing Stability: Patient Unable To Answer (3/14/2024)    Housing Stability Vital Sign     Unable to Pay for Housing in the Last Year: Patient unable to answer     Number of Places Lived in the Last Year: 1     Unstable Housing in the Last Year: Patient unable to answer   Recent Concern: Housing Stability - High Risk (3/9/2024)     Housing Stability Vital Sign     Unable to Pay for Housing in the Last Year: Yes     Unstable Housing in the Last Year: No       Precautions:     Allergies as of 04/10/2024    (No Known Allergies)       WOC Assessment Details/Treatment     Pt off the floor- will return for skin assmt- and care.       06/27/24 1600   WOCN Assessment   WOCN Total Time (mins) 15   Visit Date 06/27/24   Visit Time 1600   Consult Type Follow Up   WOCN Speciality Wound   Intervention chart review  (pt URIEL for procedure; will return.)   Skin Interventions   Pressure Reduction Devices positioning supports utilized   Pressure Reduction Techniques frequent weight shift encouraged;weight shift assistance provided   Skin Protection skin sealant/moisture barrier applied   Positioning   Body Position turned   Head of Bed (HOB) Positioning HOB elevated   Positioning/Transfer Devices pillows;in use;wedge   Pressure Injury Prevention    Check Moisture Management Pad Done     Will continue to follow as needed and/ or as directed until discharge.      06/28/2024

## 2024-06-28 NOTE — PLAN OF CARE
[facility] - Telemetry Stepdown  Discharge Reassessment    Primary Care Provider: No, Primary Doctor    Expected Discharge Date: 7/1/2024    Reassessment (most recent)       Discharge Reassessment - 06/28/24 1546          Discharge Reassessment    Assessment Type Discharge Planning Reassessment     Did the patient's condition or plan change since previous assessment? No     Discharge Plan discussed with: Adult children     Communicated ZEKE with patient/caregiver Yes     Discharge Plan A New Nursing Home placement - correction care facility     Discharge Plan B New Nursing Home placement - correction care facility     DME Needed Upon Discharge  other (see comments)   TBD    Transition of Care Barriers None     Why the patient remains in the hospital Requires continued medical care        Post-Acute Status    Post-Acute Authorization Placement     Post-Acute Placement Status Set-up Complete/Auth obtained   [facility]    Diaylsis Status Set-up Complete/Auth obtained   [facility]                  Discharge Plan A and Plan B have been determined by review of patient's clinical status, future medical and therapeutic needs, and coverage/benefits for post-acute care in coordination with multidisciplinary team members.     Sara Loredo RN  Ext [phone]

## 2024-06-28 NOTE — PROGRESS NOTES
Pt seen for wound care f/u; pt lying in bed, awake, with family at bedside. Pt agreeable to wound/skin assessment. Heels are clear, left posterior wound is healed; see assessment below for right upper thigh wound. Education provided to family on wound care. Pt is on a specialty mattress.  Wound care orders in place.     Jeanine Palmer RN/ALEXANDREA  6/28/2024 06/28/24 1545   WOCN Assessment   WOCN Total Time (mins) 30   Visit Date 06/28/24   Visit Time 1545   Consult Type Follow Up   WOCN Speciality Wound   Wound surgical   Number of Wounds 2   Intervention assessed;applied;chart review   Teaching on-going   Pressure Injury Prevention    Heel preventative measures Peel back dressing/boot, assess skin and reapply        Wound 05/02/24 1000 Incision Left posterior Greater trochanter   Date First Assessed/Time First Assessed: 05/02/24 1000   Primary Wound Type: (c) Incision  Side: Left  Orientation: posterior  Location: Greater trochanter   Wound Image    Dressing Appearance Open to air   Drainage Amount None   Drainage Characteristics/Odor No odor   Appearance Dry;Epithelialization;Closed/resurfaced;Other (see comments)  (closed/healed)        Wound 06/28/24 1545 Incision Right anterior;upper Thigh   Date First Assessed/Time First Assessed: 06/28/24 1545   Primary Wound Type: Incision  Side: Right  Orientation: anterior;upper  Location: Thigh   Wound Image     Dressing Appearance Open to air   Drainage Amount None   Drainage Characteristics/Odor No odor   Appearance Red;Moist;Granulating   Periwound Area Intact;Scar tissue   Wound Edges Defined   Care Cleansed with:;Wound cleanser;Applied:   Dressing Silver;Other (comment)  (Interdry)

## 2024-06-28 NOTE — ASSESSMENT & PLAN NOTE
Adjusting insulin to account for diabetic TF    Patient's FSGs are controlled on current medication regimen.  Last A1c reviewed-   Lab Results   Component Value Date    HGBA1C 6.2 (H) 05/27/2024     Most recent fingerstick glucose reviewed-   Recent Labs   Lab 06/27/24  1231 06/27/24  1656 06/27/24 2007   POCTGLUCOSE 158* 220* 146*       Current correctional scale  Medium  Maintain anti-hyperglycemic dose as follows-   Antihyperglycemics (From admission, onward)    Start     Stop Route Frequency Ordered    06/09/24 2100  insulin glargine U-100 (Lantus) pen 13 Units         -- SubQ Nightly 06/09/24 0756    06/09/24 1255  insulin aspart U-100 pen 0-10 Units         -- SubQ Before meals & nightly PRN 06/09/24 1155        Hold Oral hypoglycemics while patient is in the hospital.  POCT glucose checks   Continue tube feeds

## 2024-06-28 NOTE — SUBJECTIVE & OBJECTIVE
Interval History:   HD line pulled yesterday. Cath tip cx pending.  Patient afebrile and leukocytosis resolved off antibiotics.    Review of Systems   Unable to perform ROS: Patient nonverbal     Objective:     Vital Signs (Most Recent):  Temp: 97.5 °F (36.4 °C) (06/28/24 0719)  Pulse: 106 (06/28/24 1145)  Resp: 18 (06/28/24 0719)  BP: 113/83 (06/28/24 1145)  SpO2: 100 % (06/28/24 0719) Vital Signs (24h Range):  Temp:  [97.5 °F (36.4 °C)-98.7 °F (37.1 °C)] 97.5 °F (36.4 °C)  Pulse:  [] 106  Resp:  [18-19] 18  SpO2:  [97 %-100 %] 100 %  BP: ()/(57-84) 113/83     Weight: 105.5 kg (232 lb 9.4 oz)  Body mass index is 36.43 kg/m².    Estimated Creatinine Clearance: 12 mL/min (A) (based on SCr of 6.8 mg/dL (H)).     Physical Exam  Vitals reviewed.   Constitutional:       General: She is not in acute distress.     Appearance: She is not ill-appearing.   HENT:      Head: Normocephalic.      Nose: Nose normal. No congestion.      Mouth/Throat:      Mouth: Mucous membranes are moist. No injury or oral lesions.      Tongue: No lesions.   Eyes:      General:         Right eye: No discharge.         Left eye: No discharge.      Conjunctiva/sclera: Conjunctivae normal.   Cardiovascular:      Rate and Rhythm: Normal rate and regular rhythm.      Pulses: Normal pulses.      Heart sounds: Normal heart sounds. No murmur heard.  Pulmonary:      Effort: Pulmonary effort is normal. No respiratory distress.      Breath sounds: Normal breath sounds. No stridor. No wheezing, rhonchi or rales.   Abdominal:      General: Bowel sounds are normal. There is no distension.      Palpations: Abdomen is soft.      Tenderness: There is no abdominal tenderness. There is no right CVA tenderness or left CVA tenderness.      Comments: Left PEG site without skin breakdown, drainage, redness, swelling.    Musculoskeletal:      Cervical back: Normal range of motion.      Right lower leg: No edema.      Left lower leg: No edema.   Skin:      Findings: No erythema, lesion or rash.      Comments: With right tunneled line. Without surrounding redness, swelling. Without tenderness to palpation.    Neurological:      Mental Status: She is alert. Mental status is at baseline. She is disoriented.          Significant Labs: All pertinent labs within the past 24 hours have been reviewed.    Significant Imaging: I have reviewed all pertinent imaging results/findings within the past 24 hours.

## 2024-06-28 NOTE — PT/OT/SLP PROGRESS
Speech Language Pathology Treatment    Patient Name:  Sarah Saravia   MRN:  [id number]  Admitting Diagnosis: Sepsis    Recommendations:                 General Recommendations:  Dysphagia therapy and Speech/language therapy  Diet recommendations:  NPO, Liquid Diet Level: NPO pt ok to receive thin liquids for pleasure  Aspiration Precautions: Continue alternate means of nutrition, HOB to 90 degrees, Monitor for s/s of aspiration, and Strict aspiration precautions   General Precautions: Standard, aspiration, aphasia, NPO  Communication strategies:  go to room if call light pushed    Assessment:     Sarah Saravia is a 53 y.o. female with an SLP diagnosis of Aphasia, Dysphagia, and Cognitive-Linguistic Impairment.      Subjective     Pt remained nonverbal/nonvocal t/o session. Daughter at bedside/    Pain/Comfort:  Pain Rating 1: 0/10    Respiratory Status: Room air    Objective:     Has the patient been evaluated by SLP for swallowing?   Yes  Keep patient NPO? Yes   Current Respiratory Status:        Pt seen on 2nd attempt as she was URIEL at HD on first attempt.  Pt awake/alert and nodded head in agreement to receive PO trials of apple juice and applesauce.  Pt accepted straw sips of apple juice with fair timing of initiation of swallow.  Pt exhibited holding after acceptance of applesauce.  Additional straw sips of juice were given x 2 in an effort to clear oral cavity, but pt displayed piecemeal deglutition and continued holding.  Oral suctioning provided to clear remainder of held liquid bolus.  No overt s/s of aspiration were observed.  Session ended to allow pt to work with PT.  Cont POC.     Goals:   Multidisciplinary Problems       SLP Goals          Problem: SLP    Goal Priority Disciplines Outcome   SLP Goal     SLP Progressing   Description: Speech Pathology Goals  To be met by 7/19/24    1. Pt will participate in ongoing diagnostic dysphagia therapy    2. Pt will answer basic Y/N questions with 50%  accuracy given MAX multi-modality cueing  3. Pt will follow single step commands paired with model across 50% of trials   4. Pt will complete automatic speech tasks with 50% accuracy given MAX multi-modality cueing         Speech Language Pathology Goals  Updated goals expected to be met by 5/10 (goals remain appropriate 6/11 - reassess on 6/18:  1. Pt will participate in ongoing swallowing assessment to determine if appropriate for PO trials for pleasure.   2. Pt will model single step command x 1 given max cues.   3. Pt will answer simple yes/no q's during a structured receptive language task x 1 given max cues.   4. Pt will phonate purposefully upon command x 1 given max cues.   5. Pt will attempt to vocalize/verbalize during automatic speech task x 1 given max cues.   6. Pt will participate in evaluation of ability to utilize basic communication board.     Goals expected to be met by 5/8:  1. Pt will participate in Modified Barium Swallow Study to determine if safe for oral intake. Goal met/attempted 5/2                               Plan:     Patient to be seen:  3 x/week   Plan of Care expires:  05/31/24  Plan of Care reviewed with:  patient, daughter   SLP Follow-Up:  Yes       Discharge recommendations:  Moderate Intensity Therapy     Time Tracking:     SLP Treatment Date:   06/28/24  Speech Start Time:  1559  Speech Stop Time:  1609     Speech Total Time (min):  10 min    Billable Minutes: Treatment Swallowing Dysfunction 10    06/28/2024

## 2024-06-28 NOTE — PT/OT/SLP PROGRESS
Physical Therapy Treatment    Patient Name:  Sarah Saravia   MRN:  [id number]    Recommendations:     Discharge Recommendations: Moderate Intensity Therapy  Discharge Equipment Recommendations: hospital bed, lift device, wheelchair  Barriers to discharge: Pt requiring increased skilled assistance at current time.     Assessment:     Sarah Saravia is a 53 y.o. female admitted with a medical diagnosis of Sepsis.  She presents with the following impairments/functional limitations: weakness, impaired endurance, impaired self care skills, impaired functional mobility, gait instability, impaired balance, decreased coordination, decreased upper extremity function, decreased lower extremity function, decreased safety awareness, decreased ROM, impaired coordination requiring total assistance and verbal cues for bed mob, scooting to EOB/HOB due to weakness.   In light of pt's current functional level and deficits, it is anticipated that pt will need to participate in a moderate intensity rehab program consisting of PT and OT in order to achieve full rehab potential to return to previous level of function and roles.  Pt remains motivated to participate in PT session and will cont to benefit from skilled PT intervention.    Rehab Prognosis: Fair; patient would benefit from acute skilled PT services to address these deficits and reach maximum level of function.    Recent Surgery: Procedure(s) (LRB):  Insertion, Catheter, Central Venous, Hemodialysis (Right) 1 Day Post-Op    Plan:     During this hospitalization, patient to be seen 3 x/week to address the identified rehab impairments via gait training, therapeutic activities, therapeutic exercises, neuromuscular re-education and progress toward the following goals:    Plan of Care Expires:  07/22/24    Subjective     Chief Complaint: fatigue  Pain/Comfort:  Pain Rating 1: 0/10  Pain Rating Post-Intervention 1: 0/10      Objective:     Communicated with nurse (Mary) prior to  session.  Patient found HOB elevated with PEG Tube upon PT entry to room.     General Precautions: Standard, aphasia, aspiration, fall, NPO  Orthopedic Precautions: N/A  Braces: N/A  Respiratory Status: Room air     Functional Mobility:  Bed Mobility:     Rolling Left:  total assistance and of 2 persons  Rolling Right: total assistance and of 2 persons  Scooting: total assistance and of 2 persons to EOB/HOB  Supine to Sit: total A of 2 for trunk elevation and LE's, exiting on the R side, HOB at 30* angle  Sit to Supine: total A of 2 for trunk and LE's, HOB flat  Balance: static sitting at the EOB 12 min      AM-PAC 6 CLICK MOBILITY  Turning over in bed (including adjusting bedclothes, sheets and blankets)?: 2  Sitting down on and standing up from a chair with arms (e.g., wheelchair, bedside commode, etc.): 1  Moving from lying on back to sitting on the side of the bed?: 1  Moving to and from a bed to a chair (including a wheelchair)?: 1  Need to walk in hospital room?: 1  Climbing 3-5 steps with a railing?: 1  Basic Mobility Total Score: 7       Treatment & Education:  Patient provided with daily orientation and goals of this PT session. They were educated to call for assistance and to transfer with hospital staff only.  Also, pt was educated on the effects of prolonged immobility and the importance of performing OOB activity and exercises to promote healing and reduce recovery time    Patient left HOB elevated with all lines intact, call button in reach, nurse notified, and daughter present..    GOALS:   Multidisciplinary Problems       Physical Therapy Goals          Problem: Physical Therapy    Goal Priority Disciplines Outcome Goal Variances Interventions   Physical Therapy Goal     PT, PT/OT Progressing     Description: Goals to be met by: 05/07/24   Goals remain appropriate 5/3/2024 to be met by 5/17/24  Goals updated 5/13/2024 to be met by 5/27/24  Goals updated 05/28/24 to be met by 06/08/24  Goals remain  appropriate 24 to be met by 24  Goals remain appropriate to be met by 24    Patient will increase functional independence with mobility by performin. Supine to sit with Maximum Assistance  2. Sit to supine with Maximum Assistance  3. Rolling to Left and Right with Moderate Assistance.  4. Sitting at edge of bed 5 minutes with Moderate Assistance- MET , monitor consistency  4a. Sitting at edge of bed 10 minutes with Supervision  5. Lower extremity exercise program x20 reps per handout, with assistance as needed  6. Sit to stand transfer with Maximum Assistance with or without LRAD  7. Stand for 2 minutes with Maximum Assistance with or without LRAD                         Time Tracking:     PT Received On: 24  PT Start Time: 1605     PT Stop Time: 1638  PT Total Time (min): 33 min     Billable Minutes: Therapeutic Activity 33    Treatment Type: Treatment  PT/PTA: PTA     Number of PTA visits since last PT visit: 3     2024

## 2024-06-28 NOTE — ASSESSMENT & PLAN NOTE
Creatine stable for now. BMP reviewed- noted Estimated Creatinine Clearance: 12 mL/min (A) (based on SCr of 6.8 mg/dL (H)). according to latest data. Based on current GFR, CKD stage is end stage.  Monitor UOP and serial BMP and adjust therapy as needed. Renally dose meds. Avoid nephrotoxic medications and procedures.    -- HD MWF  -- nephro following  -- Hypophosphatemic on sevelamer  -- SW onboard for placement to [facility] [place] [place] to continue dialysis.

## 2024-06-28 NOTE — SUBJECTIVE & OBJECTIVE
Interval History: NAEO, VSS, trending fever curve and line cultures. Pending disposition.    Review of Systems   Unable to perform ROS: Patient nonverbal     Objective:     Vital Signs (Most Recent):  Temp: 97.5 °F (36.4 °C) (06/28/24 0719)  Pulse: 100 (06/28/24 1113)  Resp: 18 (06/28/24 0719)  BP: 103/73 (06/28/24 1113)  SpO2: 100 % (06/28/24 0719) Vital Signs (24h Range):  Temp:  [97.5 °F (36.4 °C)-98.7 °F (37.1 °C)] 97.5 °F (36.4 °C)  Pulse:  [] 100  Resp:  [18-19] 18  SpO2:  [97 %-100 %] 100 %  BP: ()/(57-84) 103/73     Weight: 105.5 kg (232 lb 9.4 oz)  Body mass index is 36.43 kg/m².    Intake/Output Summary (Last 24 hours) at 6/28/2024 1117  Last data filed at 6/28/2024 0400  Gross per 24 hour   Intake 400 ml   Output --   Net 400 ml         Physical Exam  Constitutional:       Appearance: Normal appearance.   HENT:      Head: Normocephalic and atraumatic.      Right Ear: External ear normal.      Left Ear: External ear normal.   Cardiovascular:      Rate and Rhythm: Normal rate and regular rhythm.      Pulses: Normal pulses.      Heart sounds: Normal heart sounds.   Pulmonary:      Effort: Pulmonary effort is normal.      Breath sounds: Normal breath sounds.   Abdominal:      General: Abdomen is flat.      Palpations: Abdomen is soft.   Musculoskeletal:         General: Normal range of motion.   Skin:     General: Skin is warm.      Capillary Refill: Capillary refill takes less than 2 seconds.   Neurological:      General: No focal deficit present.      Mental Status: She is alert and oriented to person, place, and time.             Significant Labs: All pertinent labs within the past 24 hours have been reviewed.    Significant Imaging: I have reviewed all pertinent imaging results/findings within the past 24 hours.

## 2024-06-28 NOTE — ASSESSMENT & PLAN NOTE
Patient has hyponatremia which is uncontrolled,We will aim to correct the sodium by 4-6mEq in 24 hours. We will monitor sodium Daily. The hyponatremia is due to ESRD. Discussed with nephrology, dialysate bath adjusted to account for and correct hyponatremia.  Recent Labs   Lab 06/28/24  0400   *

## 2024-06-28 NOTE — ASSESSMENT & PLAN NOTE
On PEG tube.Wound care with PEG dressing changes.   CT abdomen with PEG in correct location    - Tube feeds as toelrated

## 2024-06-28 NOTE — PROGRESS NOTES
06/28/24 1200   Post-Hemodialysis Assessment   Rinseback Volume (mL) 250 mL   Blood Volume Processed (Liters) 45.3 L   Dialyzer Clearance Heavily streaked   Duration of Treatment 180 minutes   Additional Fluid Intake (mL) 350 mL   Total UF (mL) 1662 mL   Net Fluid Removal 1002   Patient Response to Treatment tolerated, clotting noted towards end of TX   Post-Hemodialysis Comments see notes     HD TX completed with 30 mins remaining in TX time due to large clot noted in arterial chamber and multiple arterial alarms. Rinse back completed. Net removal 1002 ml. Pt awake, alert, VSS, NAD.

## 2024-06-28 NOTE — PROGRESS NOTES
Please see previous notes from this SW for continuity.    __________________________________________________  SW spoke with [facility] clinic manager Jaimee. SW updated that per inpatient team, pt unlikely to discharge until next wee. SW to update [facility] Monday with pt's discharge plans.    Dafne Mccord LMSW  [facility]  [phone]

## 2024-06-28 NOTE — PROGRESS NOTES
Patient arrived in a bed  to dialysis unit. Contact isolation maintained  Report received from primary nurse  VS's per dialysis Flowsheet.     Hemodialysis initiated using the following:     Dialysis Access: right IJ cath     Will Maintain telemetry and blood pressure monitoring throughout treatment.  Refer to dialysis flowsheet and MAR for details.

## 2024-06-29 LAB
ALBUMIN SERPL BCP-MCNC: 3.1 G/DL (ref 3.5–5.2)
ALP SERPL-CCNC: 77 U/L (ref 55–135)
ALT SERPL W/O P-5'-P-CCNC: 11 U/L (ref 10–44)
ANION GAP SERPL CALC-SCNC: 15 MMOL/L (ref 8–16)
ANISOCYTOSIS BLD QL SMEAR: SLIGHT
AST SERPL-CCNC: 28 U/L (ref 10–40)
BASOPHILS # BLD AUTO: 0.08 K/UL (ref 0–0.2)
BASOPHILS NFR BLD: 1.2 % (ref 0–1.9)
BILIRUB SERPL-MCNC: 0.4 MG/DL (ref 0.1–1)
BUN SERPL-MCNC: 20 MG/DL (ref 6–20)
CALCIUM SERPL-MCNC: 10.2 MG/DL (ref 8.7–10.5)
CHLORIDE SERPL-SCNC: 95 MMOL/L (ref 95–110)
CO2 SERPL-SCNC: 18 MMOL/L (ref 23–29)
CREAT SERPL-MCNC: 5.5 MG/DL (ref 0.5–1.4)
DIFFERENTIAL METHOD BLD: ABNORMAL
EOSINOPHIL # BLD AUTO: 0.4 K/UL (ref 0–0.5)
EOSINOPHIL NFR BLD: 5.1 % (ref 0–8)
ERYTHROCYTE [DISTWIDTH] IN BLOOD BY AUTOMATED COUNT: 16.9 % (ref 11.5–14.5)
EST. GFR  (NO RACE VARIABLE): 8.7 ML/MIN/1.73 M^2
GLUCOSE SERPL-MCNC: 106 MG/DL (ref 70–110)
HCT VFR BLD AUTO: 30.5 % (ref 37–48.5)
HGB BLD-MCNC: 9.4 G/DL (ref 12–16)
HYPOCHROMIA BLD QL SMEAR: ABNORMAL
IMM GRANULOCYTES # BLD AUTO: 0.07 K/UL (ref 0–0.04)
IMM GRANULOCYTES NFR BLD AUTO: 1 % (ref 0–0.5)
LYMPHOCYTES # BLD AUTO: 1.8 K/UL (ref 1–4.8)
LYMPHOCYTES NFR BLD: 26.1 % (ref 18–48)
MAGNESIUM SERPL-MCNC: 2.1 MG/DL (ref 1.6–2.6)
MCH RBC QN AUTO: 24.5 PG (ref 27–31)
MCHC RBC AUTO-ENTMCNC: 30.8 G/DL (ref 32–36)
MCV RBC AUTO: 79 FL (ref 82–98)
MONOCYTES # BLD AUTO: 1 K/UL (ref 0.3–1)
MONOCYTES NFR BLD: 14.6 % (ref 4–15)
NEUTROPHILS # BLD AUTO: 3.6 K/UL (ref 1.8–7.7)
NEUTROPHILS NFR BLD: 52 % (ref 38–73)
NRBC BLD-RTO: 0 /100 WBC
OVALOCYTES BLD QL SMEAR: ABNORMAL
PHOSPHATE SERPL-MCNC: 4.2 MG/DL (ref 2.7–4.5)
PLATELET # BLD AUTO: 273 K/UL (ref 150–450)
PMV BLD AUTO: ABNORMAL FL (ref 9.2–12.9)
POCT GLUCOSE: 119 MG/DL (ref 70–110)
POCT GLUCOSE: 135 MG/DL (ref 70–110)
POCT GLUCOSE: 137 MG/DL (ref 70–110)
POCT GLUCOSE: 143 MG/DL (ref 70–110)
POIKILOCYTOSIS BLD QL SMEAR: SLIGHT
POLYCHROMASIA BLD QL SMEAR: ABNORMAL
POTASSIUM SERPL-SCNC: 4.3 MMOL/L (ref 3.5–5.1)
PROT SERPL-MCNC: 8.3 G/DL (ref 6–8.4)
RBC # BLD AUTO: 3.84 M/UL (ref 4–5.4)
SODIUM SERPL-SCNC: 128 MMOL/L (ref 136–145)
SPHEROCYTES BLD QL SMEAR: ABNORMAL
WBC # BLD AUTO: 6.9 K/UL (ref 3.9–12.7)

## 2024-06-29 PROCEDURE — 27000207 HC ISOLATION

## 2024-06-29 PROCEDURE — 25000003 PHARM REV CODE 250

## 2024-06-29 PROCEDURE — 85025 COMPLETE CBC W/AUTO DIFF WBC: CPT

## 2024-06-29 PROCEDURE — 83735 ASSAY OF MAGNESIUM: CPT

## 2024-06-29 PROCEDURE — 63600175 PHARM REV CODE 636 W HCPCS: Performed by: INTERNAL MEDICINE

## 2024-06-29 PROCEDURE — 25000003 PHARM REV CODE 250: Performed by: STUDENT IN AN ORGANIZED HEALTH CARE EDUCATION/TRAINING PROGRAM

## 2024-06-29 PROCEDURE — 99233 SBSQ HOSP IP/OBS HIGH 50: CPT | Mod: ,,, | Performed by: INTERNAL MEDICINE

## 2024-06-29 PROCEDURE — 84100 ASSAY OF PHOSPHORUS: CPT

## 2024-06-29 PROCEDURE — 36415 COLL VENOUS BLD VENIPUNCTURE: CPT

## 2024-06-29 PROCEDURE — 80053 COMPREHEN METABOLIC PANEL: CPT

## 2024-06-29 PROCEDURE — 20600001 HC STEP DOWN PRIVATE ROOM

## 2024-06-29 PROCEDURE — 25000003 PHARM REV CODE 250: Performed by: INTERNAL MEDICINE

## 2024-06-29 RX ORDER — SODIUM CHLORIDE 9 MG/ML
INJECTION, SOLUTION INTRAVENOUS ONCE
Status: CANCELLED | OUTPATIENT
Start: 2024-07-01

## 2024-06-29 RX ADMIN — PANTOPRAZOLE SODIUM 40 MG: 40 GRANULE, DELAYED RELEASE ORAL at 09:06

## 2024-06-29 RX ADMIN — METOPROLOL TARTRATE 25 MG: 25 TABLET, FILM COATED ORAL at 09:06

## 2024-06-29 RX ADMIN — ATORVASTATIN CALCIUM 40 MG: 40 TABLET, FILM COATED ORAL at 09:06

## 2024-06-29 RX ADMIN — POLYETHYLENE GLYCOL 3350 17 G: 17 POWDER, FOR SOLUTION ORAL at 09:06

## 2024-06-29 RX ADMIN — Medication 500 MG: at 09:06

## 2024-06-29 RX ADMIN — ASPIRIN 81 MG CHEWABLE TABLET 81 MG: 81 TABLET CHEWABLE at 09:06

## 2024-06-29 RX ADMIN — POLYETHYLENE GLYCOL 3350 17 G: 17 POWDER, FOR SOLUTION ORAL at 03:06

## 2024-06-29 RX ADMIN — ERTAPENEM 500 MG: 1 INJECTION INTRAMUSCULAR; INTRAVENOUS at 09:06

## 2024-06-29 RX ADMIN — INSULIN GLARGINE 13 UNITS: 100 INJECTION, SOLUTION SUBCUTANEOUS at 09:06

## 2024-06-29 NOTE — ASSESSMENT & PLAN NOTE
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 ID is re-consulted as she developed some leukocytosis. Blood cultures were negative.  Her tunneled line was removed and sent for culture and a new one was placed immediately in the same location.  Leukocytosis has since resolved.  Cultures from the removed line are positive for Proteus with sensitivities pending.  She has a prior history of ESBL Proteus infection.    Unsure if this was just colonization of the line or if there was any infection in the surrounding soft tissues.  A new line was placed in the same location.  So I will plan to give a short course of antibiotics out of an abundance of precaution.      Plan  Start IV ertapenem.  Follow up sensitivities of cath tip culture.  Anticipate short 7 day course of antibiotics.

## 2024-06-29 NOTE — PROGRESS NOTES
[facility] - Telemetry [facility]  Progress Note    Patient Name: Sarah Saravia  MRN: [id number]  Patient Class: IP- Inpatient   Admission Date: 4/10/2024  Length of Stay: 80 days  Attending Physician: Janett Jean MD  Primary Care Provider: Deanna, Primary Doctor        Subjective:     Principal Problem:Sepsis        HPI:  53 yof with 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 History was given from patient's daughter. She was undergoing dialysis today and noticed she was lethargic, less alert than usual self. Pt completed dialysis and still not acting herself. EMS was called, fever of a 100.  On chart review, she did have an episode of large volume emesis around 1700.  Per EMS, she had a slightly elevated temp 100.0°F, glucose 300s.     In the ED: UA 2+ leuks, >100 WBC, many bacteria, WBC 17, CT abd/pelvis concerning for cystitis. Given vanc/zosyn    Overview/Hospital Course:  53 JARROD admitted to Hospital Medicine service for urosepsis. Vanc/zosyn initiated, found to have staph epi in all 4 bottles, vanc/ertapenem course completed per ID. Vascular Neurology consulted concerning mental status change with the recommendation to initiate ASA 81 QD and to obtain an MRI to r/o new stroke. Per discussion with vascular neurology, MRI findings appear to be expected changes from prior stroke. Nephrology was consulted for regularly scheduled dialysis. Pulm consulted, patient decannulated 4/30. Failed swallow assessment, continuing tube feeds. Ongoing placement difficulties d/t need for accepting HD facility to have sandee lift with 2 personnel to operate. Once patient gets accepted at [facility], next step will be to work with daughter on retirement NH placement. SW onboard working on paperwork for long term Medicaid application. H/c c/b new fever, ID reconsulted, CT chest showing bilateral ground glass  opacities, Vanc/meropenem course to be completed 6/17, however patient had further episode of fever and will need continued merem and ID consulted for assistance. CT pan scan to assess for infection. Imaging without signs of infection, patient to complete empiric abx for 5 days. Patient continues fevering despite broad abx, no identified source. Chest x-ray, peripheral smear to assess for other sources. Tunneled catheter removed and replaced via interventional nephrology at recommendation of ID. Catheter tip culture + for proteus.    Interval History: NAEO, VSS, Catheter tip culture + for proteus. Pending disposition    Review of Systems   Unable to perform ROS: Patient nonverbal     Objective:     Vital Signs (Most Recent):  Temp: 97.8 °F (36.6 °C) (06/29/24 0735)  Pulse: 100 (06/29/24 0735)  Resp: 19 (06/29/24 0735)  BP: 107/71 (06/29/24 0735)  SpO2: 100 % (06/29/24 0735) Vital Signs (24h Range):  Temp:  [97.6 °F (36.4 °C)-98 °F (36.7 °C)] 97.8 °F (36.6 °C)  Pulse:  [] 100  Resp:  [17-19] 19  SpO2:  [98 %-100 %] 100 %  BP: ()/(57-84) 107/71     Weight: 105.5 kg (232 lb 9.4 oz)  Body mass index is 36.43 kg/m².    Intake/Output Summary (Last 24 hours) at 6/29/2024 0907  Last data filed at 6/29/2024 0634  Gross per 24 hour   Intake 350 ml   Output 1662 ml   Net -1312 ml         Physical Exam  Constitutional:       Appearance: Normal appearance.   HENT:      Head: Normocephalic and atraumatic.      Right Ear: External ear normal.      Left Ear: External ear normal.   Cardiovascular:      Rate and Rhythm: Normal rate and regular rhythm.      Pulses: Normal pulses.      Heart sounds: Normal heart sounds.   Pulmonary:      Effort: Pulmonary effort is normal.      Breath sounds: Normal breath sounds.   Abdominal:      General: Abdomen is flat.      Palpations: Abdomen is soft.   Musculoskeletal:         General: Normal range of motion.   Skin:     General: Skin is warm.      Capillary Refill: Capillary refill  takes less than 2 seconds.   Neurological:      General: No focal deficit present.      Mental Status: She is alert and oriented to person, place, and time.             Significant Labs: All pertinent labs within the past 24 hours have been reviewed.    Significant Imaging: I have reviewed all pertinent imaging results/findings within the past 24 hours.    Assessment/Plan:      * Sepsis  This patient does have evidence of infective focus. My overall impression is sepsis. Source: Skin and Soft Tissue (location Abdominal). Not requiring pressors. Source control achieved by: vanc/meropenem.  Concerns septic source may be PEG site with associated purulent wound despite Wound Care attempts to protect with barriers. PEG placed by General Surgery 2/2024. General Surgery consulted regarding PEG site ordered CT No evidence of infection  US extremities negative for thrombus   CT pan scan w/ IV contrast without evidence of infection  Interventional nephrology consulted for line eval - low suspicion for clot, getting blood cx to r/o infected line  Peripheral smear with findings concerning for infection    Tunneled catheter removed and replaced  Catheter line + for proteus    - Id consulted, appreciate continued recommendations    Cervical stenosis of spinal canal  Outpatient MRI and NSGY f/up    Irritant contact dermatitis due friction or contact with other specified body fluids  Wound care following     Debility  Needs:  Wheelchair: patient has a mobility limitation that significantly impairs her ability to participate in one or more mobility related activities of daily living (MRADL's) such as toileting, feeding, dressing, grooming, and bathing in customary locations in the home.  The mobility limitation cannot be sufficiently resolved by the use of a cane or walker.   The use of a manual wheelchair will significantly improve the patient's ability to participate in MRADLS and the patient will use it on regular basis in the  home.  Family/pt has expressed her willingness to use a manual wheelchair in the home.  She also has a caregiver who is capable of assisting in mobility.    Mrs Saravia requires a hospital bed due to her requiring positioning of the body in ways not feasible with an ordinary bed to alleviate pain/ is completely immobile /or limited mobility and cannot independently make changes in body position without the use of the bed.  The positioning of the body cannot be sufficiently resolved by the use of pillows and wedges    Patient has a mobility limitation that significantly impairs their ability to participate in one or more mobility related activities of daily living, including toileting. This deficit can be resolved by using a bedside commode. Patient demonstrates mobility limitations that will cause them to be confined to one room at home without bathroom access for up to 30 days. Using a bedside commode will greatly improve the patient's ability to participate in MRADLs     Complication of vascular dialysis catheter  Cath intermittently clogging, not resolving with cath-hedy, going for IR venogram 4/30 with possible exchange.   S/p exchange with IR on 4/30  S/p 2nd exchange for concern of line infection in setting occult infection    Constipation  Stool burden on CT    -continue bowel reg  -monitor for BMs    Moderate malnutrition  Nutrition consulted. Most recent weight and BMI monitored-     Measurements:  Wt Readings from Last 1 Encounters:   06/26/24 105.5 kg (232 lb 9.4 oz)   Body mass index is 36.43 kg/m².    Patient has been screened and assessed by RD.    Malnutrition Type:  Context: acute illness or injury  Level: moderate    Malnutrition Characteristic Summary:  Weight Loss (Malnutrition): 10% in 6 months  Subcutaneous Fat (Malnutrition): mild depletion  Muscle Mass (Malnutrition): mild depletion  Fluid Accumulation (Malnutrition): mild    Interventions/Recommendations (treatment strategy):  - TF  - previous  "history of failed swallow studies    Prolonged QT interval  Limit Qtc prolonging drugs as able    Spastic hemiplegia of right dominant side as late effect of cerebrovascular disease  Important that patient is able to sit in chair for 4h for dialysis placement needs, even if she requires lift/assistance getting into the chair.This patient has Chronic right hemiplegia due to stroke. Physical therapy services has been scheduled. Continue all standard measures for pressure injury prevention and consult wound care for any wounds (chronic or acute).    ESRD (end stage renal disease) on dialysis  Creatine stable for now. BMP reviewed- noted Estimated Creatinine Clearance: 14.6 mL/min (A) (based on SCr of 5.5 mg/dL (H)). according to latest data. Based on current GFR, CKD stage is end stage.  Monitor UOP and serial BMP and adjust therapy as needed. Renally dose meds. Avoid nephrotoxic medications and procedures.    -- HD MWF  -- nephro following  -- Hypophosphatemic on sevelamer  -- SW onboard for placement to [facility] to continue dialysis.    PEG (percutaneous endoscopic gastrostomy) status  On PEG tube.Wound care with PEG dressing changes.   CT abdomen with PEG in correct location    - Tube feeds as toelrated    Leukocytosis  See "Sepsis"    Type 2 diabetes mellitus with hyperglycemia, with long-term current use of insulin  Adjusting insulin to account for diabetic TF    Patient's FSGs are controlled on current medication regimen.  Last A1c reviewed-   Lab Results   Component Value Date    HGBA1C 6.2 (H) 05/27/2024     Most recent fingerstick glucose reviewed-   Recent Labs   Lab 06/27/24  1231 06/27/24  1656 06/27/24 2007   POCTGLUCOSE 158* 220* 146*       Current correctional scale  Medium  Maintain anti-hyperglycemic dose as follows-   Antihyperglycemics (From admission, onward)      Start     Stop Route Frequency Ordered    06/09/24 2100  insulin glargine U-100 (Lantus) pen 13 Units         -- SubQ Nightly " 06/09/24 0756    06/09/24 1255  insulin aspart U-100 pen 0-10 Units         -- SubQ Before meals & nightly PRN 06/09/24 1155          Hold Oral hypoglycemics while patient is in the hospital.  POCT glucose checks   Continue tube feeds    Hyponatremia  Patient has hyponatremia which is uncontrolled,We will aim to correct the sodium by 4-6mEq in 24 hours. We will monitor sodium Daily. The hyponatremia is due to ESRD. Discussed with nephrology, dialysate bath adjusted to account for and correct hyponatremia.  Recent Labs   Lab 06/29/24  0529   *         Ros's gangrene  Pt experienced severe episode of ros's gangrene in January of 2024. Pt underwent extensive course, currently still healing from this, but no longer has wound vac. Picture in media tabs    - Wound care while inpatient  - NH with wound care orders      VTE Risk Mitigation (From admission, onward)           Ordered     heparin (porcine) injection 1,000 Units  As needed (PRN)         06/21/24 0828     heparin (porcine) injection 1,000 Units  As needed (PRN)         06/10/24 0035     heparin (porcine) injection 3,000 Units  As needed (PRN)         04/17/24 0825                    Discharge Planning   ZEKE: 7/1/2024     Code Status: Full Code   Is the patient medically ready for discharge?: No    Reason for patient still in hospital (select all that apply): Patient trending condition and Pending disposition  Discharge Plan A: New Nursing Home placement - halfway care facility   Discharge Delays: (!) Payor Issues          Lucien Driver MD  Department of Hospital Medicine   [facility] - Telemetry Stepdown

## 2024-06-29 NOTE — NURSING
Nurses Note -- 4 Eyes      6/29/2024   1:19 AM      Skin assessed during: Daily Assessment      [] No Altered Skin Integrity Present    []Prevention Measures Documented      [x] Yes- Altered Skin Integrity Present or Discovered   [] LDA Added if Not in Epic (Describe Wound)   [] New Altered Skin Integrity was Present on Admit and Documented in LDA   [] Wound Image Taken    Wound Care Consulted? Yes    Attending Nurse:  Gretel Eastman RN/Staff Member:  Brain

## 2024-06-29 NOTE — NURSING
Nurses Note -- 4 Eyes      6/29/2024   7:41 AM      Skin assessed during: Q Shift Change      [] No Altered Skin Integrity Present    []Prevention Measures Documented      [x] Yes- Altered Skin Integrity Present or Discovered   [] LDA Added if Not in Epic (Describe Wound)   [] New Altered Skin Integrity was Present on Admit and Documented in LDA   [] Wound Image Taken    Wound Care Consulted? Yes    Attending Nurse:  ELISABETH Hernandez    Second RN/Staff Member:  ELISABETH Majano

## 2024-06-29 NOTE — ASSESSMENT & PLAN NOTE
Patient has hyponatremia which is uncontrolled,We will aim to correct the sodium by 4-6mEq in 24 hours. We will monitor sodium Daily. The hyponatremia is due to ESRD. Discussed with nephrology, dialysate bath adjusted to account for and correct hyponatremia.  Recent Labs   Lab 06/29/24  0529   *

## 2024-06-29 NOTE — SUBJECTIVE & OBJECTIVE
Interval History: NAEO, VSS, Catheter tip culture + for proteus. Pending disposition    Review of Systems   Unable to perform ROS: Patient nonverbal     Objective:     Vital Signs (Most Recent):  Temp: 97.8 °F (36.6 °C) (06/29/24 0735)  Pulse: 100 (06/29/24 0735)  Resp: 19 (06/29/24 0735)  BP: 107/71 (06/29/24 0735)  SpO2: 100 % (06/29/24 0735) Vital Signs (24h Range):  Temp:  [97.6 °F (36.4 °C)-98 °F (36.7 °C)] 97.8 °F (36.6 °C)  Pulse:  [] 100  Resp:  [17-19] 19  SpO2:  [98 %-100 %] 100 %  BP: ()/(57-84) 107/71     Weight: 105.5 kg (232 lb 9.4 oz)  Body mass index is 36.43 kg/m².    Intake/Output Summary (Last 24 hours) at 6/29/2024 0907  Last data filed at 6/29/2024 0634  Gross per 24 hour   Intake 350 ml   Output 1662 ml   Net -1312 ml         Physical Exam  Constitutional:       Appearance: Normal appearance.   HENT:      Head: Normocephalic and atraumatic.      Right Ear: External ear normal.      Left Ear: External ear normal.   Cardiovascular:      Rate and Rhythm: Normal rate and regular rhythm.      Pulses: Normal pulses.      Heart sounds: Normal heart sounds.   Pulmonary:      Effort: Pulmonary effort is normal.      Breath sounds: Normal breath sounds.   Abdominal:      General: Abdomen is flat.      Palpations: Abdomen is soft.   Musculoskeletal:         General: Normal range of motion.   Skin:     General: Skin is warm.      Capillary Refill: Capillary refill takes less than 2 seconds.   Neurological:      General: No focal deficit present.      Mental Status: She is alert and oriented to person, place, and time.             Significant Labs: All pertinent labs within the past 24 hours have been reviewed.    Significant Imaging: I have reviewed all pertinent imaging results/findings within the past 24 hours.

## 2024-06-29 NOTE — ASSESSMENT & PLAN NOTE
This patient does have evidence of infective focus. My overall impression is sepsis. Source: Skin and Soft Tissue (location Abdominal). Not requiring pressors. Source control achieved by: vanc/meropenem.  Concerns septic source may be PEG site with associated purulent wound despite Wound Care attempts to protect with barriers. PEG placed by General Surgery 2/2024. General Surgery consulted regarding PEG site ordered CT No evidence of infection  US extremities negative for thrombus   CT pan scan w/ IV contrast without evidence of infection  Interventional nephrology consulted for line eval - low suspicion for clot, getting blood cx to r/o infected line  Peripheral smear with findings concerning for infection    Tunneled catheter removed and replaced  Catheter line + for proteus    - Id consulted, appreciate continued recommendations

## 2024-06-29 NOTE — ASSESSMENT & PLAN NOTE
Creatine stable for now. BMP reviewed- noted Estimated Creatinine Clearance: 14.6 mL/min (A) (based on SCr of 5.5 mg/dL (H)). according to latest data. Based on current GFR, CKD stage is end stage.  Monitor UOP and serial BMP and adjust therapy as needed. Renally dose meds. Avoid nephrotoxic medications and procedures.    -- HD MWF  -- nephro following  -- Hypophosphatemic on sevelamer  -- SW onboard for placement to [facility] to continue dialysis.

## 2024-06-29 NOTE — CONSULTS
[facility] - Telemetry Stepdown  Infectious Disease  Consult Note    Patient Name: Sarah Saravia  MRN: [id number]  Admission Date: 4/10/2024  Hospital Length of Stay: 80 days  Attending Physician: Janett Jean MD  Primary Care Provider: Deanna, Primary Doctor     Isolation Status: Contact    Patient information was obtained from past medical records and ER records.      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 ID is re-consulted as she developed some leukocytosis. Blood cultures were negative.  Her tunneled line was removed and sent for culture and a new one was placed immediately in the same location.  Leukocytosis has since resolved.  Cultures from the removed line are positive for Proteus with sensitivities pending.  She has a prior history of ESBL Proteus infection.    Unsure if this was just colonization of the line or if there was any infection in the surrounding soft tissues.  A new line was placed in the same location.  So I will plan to give a short course of antibiotics out of an abundance of precaution.      Plan  Start IV ertapenem.  Follow up sensitivities of cath tip culture.  Anticipate short 7 day course of antibiotics.        Thank you for your consult. I will follow-up with patient. Please contact us if you have any additional questions.    Amador España MD  Infectious Disease  [facility] - Telemetry Stepdown    Subjective:     Principal Problem: Sepsis    HPI: 53F with history of CVA now nonverbal with the tracheostomy (decannulated) and PEG, uncontrolled diabetes, Ros's gangrene in January 2024, end-stage renal disease on dialysis who resides at  [facility] extended [facility] and was transferred for fever and altered mental status. Reportedly patient was less responsive than her baseline had an episode of emesis, in the ED she had a CT abdomen/pelvis concerning for cystitis as well as a UA (obtained via straight cath) concerning for a UTI. She was initially started on vancomycin and Zosyn later broadened to meropenem. On presentation her labs were notable for leukocytosis of 17, as well as a lactic acidosis of 3.9 that has improved to 2.4 with the administration of 1 L of IV fluids, we are being cautious with fluid repletion given her end-stage renal disease and oliguric status. ID is now consulted for abrupt increase in WBC to 22 w/ associated fever. Daughter at bedside notes that patient had a few episodes of vomiting 2 days ago after initiation of tube feeds, and feels her breathing pattern is different today. Patient is unable to answer questions. CXR  w/ increased vascular congestion. She has been restarted on broad spectrum abx.     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    Hypermagnesemia 04/11/2024    POA, Mg 3.2  Daily chem       Morbid obesity     Necrotizing fasciitis        Past Surgical History:   Procedure Laterality Date    CLOSURE OF WOUND Right 2/22/2024    Procedure: CLOSURE, WOUND;  Surgeon: Steve Cole MD;  Location: [address];  Service: General;  Laterality: Right;    ESOPHAGOGASTRODUODENOSCOPY W/ PEG N/A 2/22/2024    Procedure: EGD, WITH PEG TUBE INSERTION;  Surgeon: Steve Cole MD;  Location: [address];  Service: General;  Laterality: N/A;    INCISION AND DRAINAGE OF PERIRECTAL REGION N/A 1/29/2024    Procedure: INCISION AND DRAINAGE, PERIRECTAL REGION;  Surgeon: Axel Ramsay MD;  Location: [facility] OR;  Service: General;  Laterality: N/A;    INSERTION OF TUNNELED CENTRAL VENOUS HEMODIALYSIS CATHETER Right 6/27/2024    Procedure: Insertion, Catheter, Central  Venous, Hemodialysis;  Surgeon: Anthony Martinez MD;  Location: [facility] CATH LAB;  Service: Interventional Nephrology;  Laterality: Right;    LUMBAR PUNCTURE N/A 2/16/2024    Procedure: Lumbar Puncture;  Surgeon: Macy Boykin;  Location: [facility] MACY;  Service: Anesthesiology;  Laterality: N/A;    PLACEMENT, TRIALYSIS CATH Right 1/31/2024    Procedure: INSERTION, CATHETER, TRIPLE LUMEN, HEMODIALYSIS, TEMPORARY;  Surgeon: Alvin Junior MD;  Location: [facility] OR;  Service: General;  Laterality: Right;    REPLACEMENT OF WOUND VACUUM-ASSISTED CLOSURE DEVICE Right 2/12/2024    Procedure: REPLACEMENT, WOUND VAC;  Surgeon: Mundo Carmona MD;  [facility];  Service: General;  Laterality: Right;    REPLACEMENT OF WOUND VACUUM-ASSISTED CLOSURE DEVICE N/A 2/15/2024    Procedure: REPLACEMENT, WOUND VAC;  Surgeon: Steve Cole MD;  [address];  Service: General;  Laterality: N/A;    REPLACEMENT OF WOUND VACUUM-ASSISTED CLOSURE DEVICE Right 2/19/2024    Procedure: REPLACEMENT, WOUND VAC;  Surgeon: Steve Cole MD;  Location: [address];  Service: General;  Laterality: Right;  RLE/groin    REPLACEMENT OF WOUND VACUUM-ASSISTED CLOSURE DEVICE Right 2/22/2024    Procedure: REPLACEMENT, WOUND VAC;  Surgeon: Steve Cole MD;  [address];  Service: General;  Laterality: Right;    TRACHEOSTOMY N/A 2/22/2024    Procedure: CREATION, TRACHEOSTOMY;  Surgeon: Steve Cole MD;  [address];  Service: General;  Laterality: N/A;    WOUND DEBRIDEMENT Bilateral 2/2/2024    Procedure: DEBRIDEMENT, WOUND;  Surgeon: Steve Cole MD;  [address];  Service: General;  Laterality: Bilateral;  Bilateral groin  Possible wound vac placement    WOUND DEBRIDEMENT Right 2/6/2024    Procedure: DEBRIDEMENT, WOUND, replace wound vac, possible closure;  Surgeon: Steve Cole MD;  [facility];  Service: General;  Laterality: Right;  RLE    WOUND DEBRIDEMENT Right  2/9/2024    Procedure: DEBRIDEMENT, WOUND w wound vac change;  Surgeon: Steve Cole MD;  Location: [facility] OR 2ND FLR;  Service: General;  Laterality: Right;  RLE    WOUND DEBRIDEMENT Right 2/12/2024    Procedure: R thigh wound debridement;  Surgeon: Mundo Carmona MD;  Location: [facility] OR 2ND FLR;  Service: General;  Laterality: Right;    WOUND EXPLORATION Right 1/31/2024    Procedure: IRRIGATION & DEBRIDEMENT, WOUND DEBRIDEMENT;  Surgeon: Alvin Junior MD;  Location: [facility] OR;  Service: General;  Laterality: Right;       Review of patient's allergies indicates:  No Known Allergies    Medications:  Medications Prior to Admission   Medication Sig    ascorbic acid, vitamin C, (VITAMIN C) 500 MG tablet 500 mg by Per G Tube route 2 (two) times daily.    pantoprazole sodium (PANTOPRAZOLE ORAL) 40 mg once daily. Liquid via gtube    [DISCONTINUED] amLODIPine (NORVASC) 10 MG tablet 10 mg by Per G Tube route once daily. 0900    [DISCONTINUED] carvediloL (COREG) 25 MG tablet 25 mg by Per G Tube route 2 (two) times daily with meals.    [DISCONTINUED] chlorhexidine (PERIDEX) 0.12 % solution Use as directed 15 mLs in the mouth or throat 2 (two) times daily.    [DISCONTINUED] heparin sodium,porcine (HEPARIN, PORCINE,) 5,000 unit/mL injection Inject 7,500 Units into the skin every 8 (eight) hours.    [DISCONTINUED] insulin aspart U-100 (NOVOLOG) 100 unit/mL injection Inject 0-10 Units into the skin as needed for High Blood Sugar. Moderate correction dose sliding scale    [DISCONTINUED] insulin detemir U-100 (LEVEMIR) 100 unit/mL injection Inject 25 Units into the skin 2 (two) times a day.    [DISCONTINUED] psyllium (KONSYL) Powd 1 packet by Per G Tube route once daily.    [DISCONTINUED] sevelamer carbonate (RENVELA) 2.4 gram PwPk 2,400 mg by Per G Tube route 3 (three) times daily.    [DISCONTINUED] zinc sulfate (ZINCATE) 50 mg zinc (220 mg) capsule 220 mg by Per G Tube route once daily.     Antibiotics (From admission,  onward)      Start     Stop Route Frequency Ordered    06/29/24 1430  ertapenem (INVANZ) 500 mg in 0.9% NaCl 100 mL IVPB         -- IV Every 24 hours (non-standard times) 06/29/24 1329          Antifungals (From admission, onward)      None          Antivirals (From admission, onward)      None             Immunization History   Administered Date(s) Administered    PPD Test 08/16/2022, 04/23/2024, 05/16/2024, 05/29/2024, 06/17/2024    Td (ADULT) 11/16/2005       Family History    None       Social History     Socioeconomic History    Marital status: Single   Tobacco Use    Smoking status: Unknown     Social Determinants of Health     Financial Resource Strain: Patient Unable To Answer (3/14/2024)    Overall Financial Resource Strain (CARDIA)     Difficulty of Paying Living Expenses: Patient unable to answer   Recent Concern: Financial Resource Strain - High Risk (3/9/2024)    Overall Financial Resource Strain (CARDIA)     Difficulty of Paying Living Expenses: Very hard   Food Insecurity: Patient Unable To Answer (3/14/2024)    Hunger Vital Sign     Worried About Running Out of Food in the Last Year: Patient unable to answer     Ran Out of Food in the Last Year: Patient unable to answer   Transportation Needs: Patient Unable To Answer (3/14/2024)    PRAPARE - Transportation     Lack of Transportation (Medical): Patient unable to answer     Lack of Transportation (Non-Medical): Patient unable to answer   Physical Activity: Inactive (3/13/2024)    Exercise Vital Sign     Days of Exercise per Week: 0 days     Minutes of Exercise per Session: 0 min   Stress: Patient Unable To Answer (3/14/2024)    [employer] - Occupational Stress Questionnaire     Feeling of Stress : Patient unable to answer   Housing Stability: Patient Unable To Answer (3/14/2024)    Housing Stability Vital Sign     Unable to Pay for Housing in the Last Year: Patient unable to answer     Number of Places Lived in the Last  Year: 1     Unstable Housing in the Last Year: Patient unable to answer   Recent Concern: Housing Stability - High Risk (3/9/2024)    Housing Stability Vital Sign     Unable to Pay for Housing in the Last Year: Yes     Unstable Housing in the Last Year: No     Review of Systems   Unable to perform ROS: Patient nonverbal     Objective:     Vital Signs (Most Recent):  Temp: 98.1 °F (36.7 °C) (06/29/24 1547)  Pulse: 99 (06/29/24 1547)  Resp: 19 (06/29/24 1547)  BP: 121/74 (06/29/24 1547)  SpO2: 99 % (06/29/24 1547) Vital Signs (24h Range):  Temp:  [97.6 °F (36.4 °C)-98.1 °F (36.7 °C)] 98.1 °F (36.7 °C)  Pulse:  [] 99  Resp:  [17-19] 19  SpO2:  [98 %-100 %] 99 %  BP: (100-134)/(67-79) 121/74     Weight: 105.5 kg (232 lb 9.4 oz)  Body mass index is 36.43 kg/m².    Estimated Creatinine Clearance: 14.6 mL/min (A) (based on SCr of 5.5 mg/dL (H)).     Physical Exam  Vitals reviewed.   Constitutional:       General: She is not in acute distress.     Appearance: She is not ill-appearing.   HENT:      Head: Normocephalic.      Nose: Nose normal. No congestion.      Mouth/Throat:      Mouth: Mucous membranes are moist. No injury or oral lesions.      Tongue: No lesions.   Eyes:      General:         Right eye: No discharge.         Left eye: No discharge.      Conjunctiva/sclera: Conjunctivae normal.   Cardiovascular:      Rate and Rhythm: Normal rate and regular rhythm.      Pulses: Normal pulses.      Heart sounds: Normal heart sounds. No murmur heard.  Pulmonary:      Effort: Pulmonary effort is normal. No respiratory distress.      Breath sounds: Normal breath sounds. No stridor. No wheezing, rhonchi or rales.   Abdominal:      General: Bowel sounds are normal. There is no distension.      Palpations: Abdomen is soft.      Tenderness: There is no abdominal tenderness. There is no right CVA tenderness or left CVA tenderness.      Comments: Left PEG site without skin breakdown, drainage, redness, swelling.     Musculoskeletal:      Cervical back: Normal range of motion.      Right lower leg: No edema.      Left lower leg: No edema.   Skin:     Findings: No erythema, lesion or rash.      Comments: With right tunneled line. Without surrounding redness, swelling. Without tenderness to palpation.    Neurological:      Mental Status: She is alert. Mental status is at baseline. She is disoriented.          Significant Labs:   Microbiology Results (last 7 days)       Procedure Component Value Units Date/Time    Blood culture [[id number]] Collected: 06/25/24 1023    Order Status: Completed Specimen: Blood Updated: 06/29/24 1212     Blood Culture, Routine No Growth to date      No Growth to date      No Growth to date      No Growth to date      No Growth to date    Blood culture [[id number]] Collected: 06/25/24 1022    Order Status: Completed Specimen: Blood Updated: 06/29/24 1212     Blood Culture, Routine No Growth to date      No Growth to date      No Growth to date      No Growth to date      No Growth to date    IV catheter culture [[id number]]  (Abnormal) Collected: 06/27/24 1545    Order Status: Completed Specimen: Catheter Tip, Tunneled Updated: 06/29/24 0720     Aerobic Culture - Cath tip PRESUMPTIVE PROTEUS SPECIES  > 15 colonies  Identification and susceptibility pending      Urine culture [[id number]] Collected: 06/25/24 1452    Order Status: Completed Specimen: Urine Updated: 06/26/24 1802     Urine Culture, Routine Multiple organisms isolated. None in predominance.  Repeat if      clinically necessary.    Narrative:      Specimen Source->Urine    Blood culture [[id number]]     Order Status: Canceled Specimen: Blood     Blood culture [[id number]]     Order Status: Canceled Specimen: Blood     Blood culture [[id number]] Collected: 06/24/24 1056    Order Status: Canceled Specimen: Blood from Line, Jugular, Internal Right     Blood culture [[id number]] Collected: 06/24/24 1056    Order Status: Canceled Specimen:  Blood from Line, Jugular, Internal Right     Blood culture [[id number]]     Order Status: Canceled Specimen: Blood     Blood culture [[id number]]     Order Status: Canceled Specimen: Blood     Urine culture [[id number]] Collected: 06/22/24 1319    Order Status: Completed Specimen: Urine Updated: 06/23/24 1432     Urine Culture, Routine Multiple organisms isolated. None in predominance.  Repeat if      clinically necessary.    Narrative:      Specimen Source->Urine    Blood culture [[id number]] Collected: 06/18/24 0807    Order Status: Completed Specimen: Blood from Peripheral, Hand, Left Updated: 06/23/24 1012     Blood Culture, Routine No growth after 5 days.    Blood culture [[id number]] Collected: 06/18/24 0806    Order Status: Completed Specimen: Blood from Peripheral, Antecubital, Right Updated: 06/23/24 1012     Blood Culture, Routine No growth after 5 days.            Significant Imaging: I have reviewed all pertinent imaging results/findings within the past 24 hours.

## 2024-06-29 NOTE — SUBJECTIVE & OBJECTIVE
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    Hypermagnesemia 04/11/2024    POA, Mg 3.2  Daily chem       Morbid obesity     Necrotizing fasciitis        Past Surgical History:   Procedure Laterality Date    CLOSURE OF WOUND Right 2/22/2024    Procedure: CLOSURE, WOUND;  Surgeon: Steve Cole MD;  Location: [address];  Service: General;  Laterality: Right;    ESOPHAGOGASTRODUODENOSCOPY W/ PEG N/A 2/22/2024    Procedure: EGD, WITH PEG TUBE INSERTION;  Surgeon: Steve Cole MD;  Location: [address];  Service: General;  Laterality: N/A;    INCISION AND DRAINAGE OF PERIRECTAL REGION N/A 1/29/2024    Procedure: INCISION AND DRAINAGE, PERIRECTAL REGION;  Surgeon: Axel Ramsay MD;  Location: [facility] OR;  Service: General;  Laterality: N/A;    INSERTION OF TUNNELED CENTRAL VENOUS HEMODIALYSIS CATHETER Right 6/27/2024    Procedure: Insertion, Catheter, Central Venous, Hemodialysis;  Surgeon: Anthony Martinez MD;  Location: [facility] CATH LAB;  Service: Interventional Nephrology;  Laterality: Right;    LUMBAR PUNCTURE N/A 2/16/2024    Procedure: Lumbar Puncture;  Surgeon: Macy Boykin;  Location: [facility];  Service: Anesthesiology;  Laterality: N/A;    PLACEMENT, TRIALYSIS CATH Right 1/31/2024    Procedure: INSERTION, CATHETER, TRIPLE LUMEN, HEMODIALYSIS, TEMPORARY;  Surgeon: Alvin Junior MD;  Location: [facility] OR;  Service: General;  Laterality: Right;    REPLACEMENT OF WOUND VACUUM-ASSISTED CLOSURE DEVICE Right 2/12/2024    Procedure: REPLACEMENT, WOUND VAC;  Surgeon: Mundo Carmona MD;  Location: [address];  Service: General;  Laterality: Right;    REPLACEMENT OF WOUND VACUUM-ASSISTED CLOSURE DEVICE N/A 2/15/2024    Procedure: REPLACEMENT, WOUND VAC;  Surgeon: Steve Cole MD;  Location: [facility] OR [address] [place] [address];  Service: General;  Laterality: N/A;    REPLACEMENT OF WOUND VACUUM-ASSISTED CLOSURE DEVICE Right 2/19/2024    Procedure:  REPLACEMENT, WOUND VAC;  Surgeon: Steve Cole MD;  Location: [facility] OR 2ND FLR;  Service: General;  Laterality: Right;  RLE/groin    REPLACEMENT OF WOUND VACUUM-ASSISTED CLOSURE DEVICE Right 2/22/2024    Procedure: REPLACEMENT, WOUND VAC;  Surgeon: Steve Cole MD;  Location: [facility] OR 2ND FLR;  Service: General;  Laterality: Right;    TRACHEOSTOMY N/A 2/22/2024    Procedure: CREATION, TRACHEOSTOMY;  Surgeon: Steve Cole MD;  Location: [facility] OR [facility] FLR;  Service: General;  Laterality: N/A;    WOUND DEBRIDEMENT Bilateral 2/2/2024    Procedure: DEBRIDEMENT, WOUND;  Surgeon: Steve Cole MD;  [facility];  Service: General;  Laterality: Bilateral;  Bilateral groin  Possible wound vac placement    WOUND DEBRIDEMENT Right 2/6/2024    Procedure: DEBRIDEMENT, WOUND, replace wound vac, possible closure;  Surgeon: Steve Cole MD;  [facility];  Service: General;  Laterality: Right;  RLE    WOUND DEBRIDEMENT Right 2/9/2024    Procedure: DEBRIDEMENT, WOUND w wound vac change;  Surgeon: Steve Cole MD;  [facility];  Service: General;  Laterality: Right;  RLE    WOUND DEBRIDEMENT Right 2/12/2024    Procedure: R thigh wound debridement;  Surgeon: Mundo Carmona MD;  [facility];  Service: General;  Laterality: Right;    WOUND EXPLORATION Right 1/31/2024    Procedure: IRRIGATION & DEBRIDEMENT, WOUND DEBRIDEMENT;  Surgeon: Alvin Junior MD;  Location: [facility] OR;  Service: General;  Laterality: Right;       Review of patient's allergies indicates:  No Known Allergies    Medications:  Medications Prior to Admission   Medication Sig    ascorbic acid, vitamin C, (VITAMIN C) 500 MG tablet 500 mg by Per G Tube route 2 (two) times daily.    pantoprazole sodium (PANTOPRAZOLE ORAL) 40 mg once daily. Liquid via gtube    [DISCONTINUED] amLODIPine (NORVASC) 10 MG tablet 10 mg by Per G Tube route once daily. 0900    [DISCONTINUED] carvediloL (COREG) 25 MG tablet 25 mg by  Per G Tube route 2 (two) times daily with meals.    [DISCONTINUED] chlorhexidine (PERIDEX) 0.12 % solution Use as directed 15 mLs in the mouth or throat 2 (two) times daily.    [DISCONTINUED] heparin sodium,porcine (HEPARIN, PORCINE,) 5,000 unit/mL injection Inject 7,500 Units into the skin every 8 (eight) hours.    [DISCONTINUED] insulin aspart U-100 (NOVOLOG) 100 unit/mL injection Inject 0-10 Units into the skin as needed for High Blood Sugar. Moderate correction dose sliding scale    [DISCONTINUED] insulin detemir U-100 (LEVEMIR) 100 unit/mL injection Inject 25 Units into the skin 2 (two) times a day.    [DISCONTINUED] psyllium (KONSYL) Powd 1 packet by Per G Tube route once daily.    [DISCONTINUED] sevelamer carbonate (RENVELA) 2.4 gram PwPk 2,400 mg by Per G Tube route 3 (three) times daily.    [DISCONTINUED] zinc sulfate (ZINCATE) 50 mg zinc (220 mg) capsule 220 mg by Per G Tube route once daily.     Antibiotics (From admission, onward)      Start     Stop Route Frequency Ordered    06/29/24 1430  ertapenem (INVANZ) 500 mg in 0.9% NaCl 100 mL IVPB         -- IV Every 24 hours (non-standard times) 06/29/24 1329          Antifungals (From admission, onward)      None          Antivirals (From admission, onward)      None             Immunization History   Administered Date(s) Administered    PPD Test 08/16/2022, 04/23/2024, 05/16/2024, 05/29/2024, 06/17/2024    Td (ADULT) 11/16/2005       Family History    None       Social History     Socioeconomic History    Marital status: Single   Tobacco Use    Smoking status: Unknown     Social Determinants of Health     Financial Resource Strain: Patient Unable To Answer (3/14/2024)    Overall Financial Resource Strain (CARDIA)     Difficulty of Paying Living Expenses: Patient unable to answer   Recent Concern: Financial Resource Strain - High Risk (3/9/2024)    Overall Financial Resource Strain (CARDIA)     Difficulty of Paying Living Expenses: Very hard   Food  Insecurity: Patient Unable To Answer (3/14/2024)    Hunger Vital Sign     Worried About Running Out of Food in the Last Year: Patient unable to answer     Ran Out of Food in the Last Year: Patient unable to answer   Transportation Needs: Patient Unable To Answer (3/14/2024)    PRAPARE - Transportation     Lack of Transportation (Medical): Patient unable to answer     Lack of Transportation (Non-Medical): Patient unable to answer   Physical Activity: Inactive (3/13/2024)    Exercise Vital Sign     Days of Exercise per Week: 0 days     Minutes of Exercise per Session: 0 min   Stress: Patient Unable To Answer (3/14/2024)    [facility] - Occupational Stress Questionnaire     Feeling of Stress : Patient unable to answer   Housing Stability: Patient Unable To Answer (3/14/2024)    Housing Stability Vital Sign     Unable to Pay for Housing in the Last Year: Patient unable to answer     Number of Places Lived in the Last Year: 1     Unstable Housing in the Last Year: Patient unable to answer   Recent Concern: Housing Stability - High Risk (3/9/2024)    Housing Stability Vital Sign     Unable to Pay for Housing in the Last Year: Yes     Unstable Housing in the Last Year: No     Review of Systems   Unable to perform ROS: Patient nonverbal     Objective:     Vital Signs (Most Recent):  Temp: 98.1 °F (36.7 °C) (06/29/24 1547)  Pulse: 99 (06/29/24 1547)  Resp: 19 (06/29/24 1547)  BP: 121/74 (06/29/24 1547)  SpO2: 99 % (06/29/24 1547) Vital Signs (24h Range):  Temp:  [97.6 °F (36.4 °C)-98.1 °F (36.7 °C)] 98.1 °F (36.7 °C)  Pulse:  [] 99  Resp:  [17-19] 19  SpO2:  [98 %-100 %] 99 %  BP: (100-134)/(67-79) 121/74     Weight: 105.5 kg (232 lb 9.4 oz)  Body mass index is 36.43 kg/m².    Estimated Creatinine Clearance: 14.6 mL/min (A) (based on SCr of 5.5 mg/dL (H)).     Physical Exam  Vitals reviewed.   Constitutional:       General: She is not in acute distress.     Appearance: She is not  ill-appearing.   HENT:      Head: Normocephalic.      Nose: Nose normal. No congestion.      Mouth/Throat:      Mouth: Mucous membranes are moist. No injury or oral lesions.      Tongue: No lesions.   Eyes:      General:         Right eye: No discharge.         Left eye: No discharge.      Conjunctiva/sclera: Conjunctivae normal.   Cardiovascular:      Rate and Rhythm: Normal rate and regular rhythm.      Pulses: Normal pulses.      Heart sounds: Normal heart sounds. No murmur heard.  Pulmonary:      Effort: Pulmonary effort is normal. No respiratory distress.      Breath sounds: Normal breath sounds. No stridor. No wheezing, rhonchi or rales.   Abdominal:      General: Bowel sounds are normal. There is no distension.      Palpations: Abdomen is soft.      Tenderness: There is no abdominal tenderness. There is no right CVA tenderness or left CVA tenderness.      Comments: Left PEG site without skin breakdown, drainage, redness, swelling.    Musculoskeletal:      Cervical back: Normal range of motion.      Right lower leg: No edema.      Left lower leg: No edema.   Skin:     Findings: No erythema, lesion or rash.      Comments: With right tunneled line. Without surrounding redness, swelling. Without tenderness to palpation.    Neurological:      Mental Status: She is alert. Mental status is at baseline. She is disoriented.          Significant Labs:   Microbiology Results (last 7 days)       Procedure Component Value Units Date/Time    Blood culture [[id number]] Collected: 06/25/24 1023    Order Status: Completed Specimen: Blood Updated: 06/29/24 1212     Blood Culture, Routine No Growth to date      No Growth to date      No Growth to date      No Growth to date      No Growth to date    Blood culture [[id number]] Collected: 06/25/24 1022    Order Status: Completed Specimen: Blood Updated: 06/29/24 1212     Blood Culture, Routine No Growth to date      No Growth to date      No Growth to date      No Growth to date       No Growth to date    IV catheter culture [[id number]]  (Abnormal) Collected: 06/27/24 1545    Order Status: Completed Specimen: Catheter Tip, Tunneled Updated: 06/29/24 0720     Aerobic Culture - Cath tip PRESUMPTIVE PROTEUS SPECIES  > 15 colonies  Identification and susceptibility pending      Urine culture [[id number]] Collected: 06/25/24 1452    Order Status: Completed Specimen: Urine Updated: 06/26/24 1802     Urine Culture, Routine Multiple organisms isolated. None in predominance.  Repeat if      clinically necessary.    Narrative:      Specimen Source->Urine    Blood culture [[id number]]     Order Status: Canceled Specimen: Blood     Blood culture [[id number]]     Order Status: Canceled Specimen: Blood     Blood culture [[id number]] Collected: 06/24/24 1056    Order Status: Canceled Specimen: Blood from Line, Jugular, Internal Right     Blood culture [[id number]] Collected: 06/24/24 1056    Order Status: Canceled Specimen: Blood from Line, Jugular, Internal Right     Blood culture [[id number]]     Order Status: Canceled Specimen: Blood     Blood culture [[id number]]     Order Status: Canceled Specimen: Blood     Urine culture [[id number]] Collected: 06/22/24 1319    Order Status: Completed Specimen: Urine Updated: 06/23/24 1432     Urine Culture, Routine Multiple organisms isolated. None in predominance.  Repeat if      clinically necessary.    Narrative:      Specimen Source->Urine    Blood culture [[id number]] Collected: 06/18/24 0807    Order Status: Completed Specimen: Blood from Peripheral, Hand, Left Updated: 06/23/24 1012     Blood Culture, Routine No growth after 5 days.    Blood culture [[id number]] Collected: 06/18/24 0806    Order Status: Completed Specimen: Blood from Peripheral, Antecubital, Right Updated: 06/23/24 1012     Blood Culture, Routine No growth after 5 days.            Significant Imaging: I have reviewed all pertinent imaging results/findings within the past 24 hours.

## 2024-06-30 PROBLEM — L30.4 INTERTRIGO: Status: ACTIVE | Noted: 2024-06-30

## 2024-06-30 LAB
ALBUMIN SERPL BCP-MCNC: 3.1 G/DL (ref 3.5–5.2)
ALP SERPL-CCNC: 82 U/L (ref 55–135)
ALT SERPL W/O P-5'-P-CCNC: 12 U/L (ref 10–44)
ANION GAP SERPL CALC-SCNC: 15 MMOL/L (ref 8–16)
AST SERPL-CCNC: 22 U/L (ref 10–40)
BACTERIA BLD CULT: NORMAL
BACTERIA BLD CULT: NORMAL
BACTERIA CATH TIP CULT: ABNORMAL
BASOPHILS # BLD AUTO: 0.06 K/UL (ref 0–0.2)
BASOPHILS NFR BLD: 0.8 % (ref 0–1.9)
BILIRUB SERPL-MCNC: 0.5 MG/DL (ref 0.1–1)
BUN SERPL-MCNC: 29 MG/DL (ref 6–20)
CALCIUM SERPL-MCNC: 10.4 MG/DL (ref 8.7–10.5)
CHLORIDE SERPL-SCNC: 93 MMOL/L (ref 95–110)
CO2 SERPL-SCNC: 20 MMOL/L (ref 23–29)
CREAT SERPL-MCNC: 7.1 MG/DL (ref 0.5–1.4)
DIFFERENTIAL METHOD BLD: ABNORMAL
EOSINOPHIL # BLD AUTO: 0.5 K/UL (ref 0–0.5)
EOSINOPHIL NFR BLD: 6.8 % (ref 0–8)
ERYTHROCYTE [DISTWIDTH] IN BLOOD BY AUTOMATED COUNT: 16.8 % (ref 11.5–14.5)
EST. GFR  (NO RACE VARIABLE): 6.4 ML/MIN/1.73 M^2
GLUCOSE SERPL-MCNC: 86 MG/DL (ref 70–110)
HCT VFR BLD AUTO: 33.1 % (ref 37–48.5)
HGB BLD-MCNC: 9.5 G/DL (ref 12–16)
IMM GRANULOCYTES # BLD AUTO: 0.03 K/UL (ref 0–0.04)
IMM GRANULOCYTES NFR BLD AUTO: 0.4 % (ref 0–0.5)
LYMPHOCYTES # BLD AUTO: 1.6 K/UL (ref 1–4.8)
LYMPHOCYTES NFR BLD: 22.5 % (ref 18–48)
MAGNESIUM SERPL-MCNC: 2.1 MG/DL (ref 1.6–2.6)
MCH RBC QN AUTO: 23.5 PG (ref 27–31)
MCHC RBC AUTO-ENTMCNC: 28.7 G/DL (ref 32–36)
MCV RBC AUTO: 82 FL (ref 82–98)
MONOCYTES # BLD AUTO: 1 K/UL (ref 0.3–1)
MONOCYTES NFR BLD: 13.4 % (ref 4–15)
NEUTROPHILS # BLD AUTO: 4 K/UL (ref 1.8–7.7)
NEUTROPHILS NFR BLD: 56.1 % (ref 38–73)
NRBC BLD-RTO: 0 /100 WBC
PHOSPHATE SERPL-MCNC: 5.3 MG/DL (ref 2.7–4.5)
PLATELET # BLD AUTO: 283 K/UL (ref 150–450)
PMV BLD AUTO: 10.8 FL (ref 9.2–12.9)
POCT GLUCOSE: 106 MG/DL (ref 70–110)
POCT GLUCOSE: 111 MG/DL (ref 70–110)
POCT GLUCOSE: 117 MG/DL (ref 70–110)
POCT GLUCOSE: 122 MG/DL (ref 70–110)
POTASSIUM SERPL-SCNC: 4.3 MMOL/L (ref 3.5–5.1)
PROT SERPL-MCNC: 7.9 G/DL (ref 6–8.4)
RBC # BLD AUTO: 4.04 M/UL (ref 4–5.4)
SODIUM SERPL-SCNC: 128 MMOL/L (ref 136–145)
WBC # BLD AUTO: 7.16 K/UL (ref 3.9–12.7)

## 2024-06-30 PROCEDURE — 80053 COMPREHEN METABOLIC PANEL: CPT

## 2024-06-30 PROCEDURE — 36415 COLL VENOUS BLD VENIPUNCTURE: CPT

## 2024-06-30 PROCEDURE — 63600175 PHARM REV CODE 636 W HCPCS: Performed by: INTERNAL MEDICINE

## 2024-06-30 PROCEDURE — 25000003 PHARM REV CODE 250

## 2024-06-30 PROCEDURE — 25000003 PHARM REV CODE 250: Performed by: INTERNAL MEDICINE

## 2024-06-30 PROCEDURE — 85025 COMPLETE CBC W/AUTO DIFF WBC: CPT

## 2024-06-30 PROCEDURE — 27000207 HC ISOLATION

## 2024-06-30 PROCEDURE — 99233 SBSQ HOSP IP/OBS HIGH 50: CPT | Mod: ,,, | Performed by: INTERNAL MEDICINE

## 2024-06-30 PROCEDURE — 20600001 HC STEP DOWN PRIVATE ROOM

## 2024-06-30 PROCEDURE — 84100 ASSAY OF PHOSPHORUS: CPT

## 2024-06-30 PROCEDURE — 83735 ASSAY OF MAGNESIUM: CPT

## 2024-06-30 PROCEDURE — 25000003 PHARM REV CODE 250: Performed by: STUDENT IN AN ORGANIZED HEALTH CARE EDUCATION/TRAINING PROGRAM

## 2024-06-30 RX ORDER — FLUCONAZOLE 40 MG/ML
200 POWDER, FOR SUSPENSION ORAL DAILY
Status: DISCONTINUED | OUTPATIENT
Start: 2024-06-30 | End: 2024-07-02

## 2024-06-30 RX ADMIN — INSULIN GLARGINE 13 UNITS: 100 INJECTION, SOLUTION SUBCUTANEOUS at 08:06

## 2024-06-30 RX ADMIN — POLYETHYLENE GLYCOL 3350 17 G: 17 POWDER, FOR SOLUTION ORAL at 09:06

## 2024-06-30 RX ADMIN — PANTOPRAZOLE SODIUM 40 MG: 40 GRANULE, DELAYED RELEASE ORAL at 09:06

## 2024-06-30 RX ADMIN — ERTAPENEM 500 MG: 1 INJECTION INTRAMUSCULAR; INTRAVENOUS at 09:06

## 2024-06-30 RX ADMIN — ASPIRIN 81 MG CHEWABLE TABLET 81 MG: 81 TABLET CHEWABLE at 09:06

## 2024-06-30 RX ADMIN — METOPROLOL TARTRATE 25 MG: 25 TABLET, FILM COATED ORAL at 08:06

## 2024-06-30 RX ADMIN — FLUCONAZOLE 200 MG: 40 POWDER, FOR SUSPENSION ORAL at 01:06

## 2024-06-30 RX ADMIN — METOPROLOL TARTRATE 25 MG: 25 TABLET, FILM COATED ORAL at 09:06

## 2024-06-30 RX ADMIN — Medication 500 MG: at 08:06

## 2024-06-30 RX ADMIN — ATORVASTATIN CALCIUM 40 MG: 40 TABLET, FILM COATED ORAL at 09:06

## 2024-06-30 RX ADMIN — POLYETHYLENE GLYCOL 3350 17 G: 17 POWDER, FOR SOLUTION ORAL at 08:06

## 2024-06-30 RX ADMIN — Medication 500 MG: at 09:06

## 2024-06-30 NOTE — PROGRESS NOTES
[facility] - Telemetry Stepdown  Infectious Disease  Progress Note    Patient Name: Sarah Saravia  MRN: [id number]  Admission Date: 4/10/2024  Length of Stay: 81 days  Attending Physician: Janett Jean MD  Primary Care Provider: Deanna, Primary Doctor    Isolation Status: Contact  Assessment/Plan:      Derm  Intertrigo  Will give 7 day course of fluconazole.      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 ID is re-consulted as she developed some leukocytosis. Blood cultures were negative.  Her tunneled line was removed and sent for culture and a new one was placed immediately in the same location.  Leukocytosis has since resolved.  Cultures from the removed line are positive for ESBL Proteus.      Unsure if this was just colonization of the line or if there was any infection in the surrounding soft tissues.  A new line was placed in the same location.  So I will plan to give a short course of antibiotics out of an abundance of precaution.      Plan  Start IV ertapenem.  500 mg IV q day while inpatient.  Switch to 1 gram, 3 times a week after dialysis after discharge.  Recommend the equivalent of 7 days total of therapy.        Anticipated Disposition: TBD    Thank you for your consult. I will sign off. Please contact us if you have any additional questions.    Amador España MD  Infectious Disease  [facility] - Telemetry Stepdown    Subjective:     Principal Problem:Sepsis    HPI: 53F with history of CVA now nonverbal with the tracheostomy (decannulated) and PEG, uncontrolled diabetes, Ros's gangrene in January 2024, end-stage renal disease on dialysis who resides at [facility] extended care and was transferred for fever and altered mental status. Reportedly patient was less responsive than her baseline had an episode of  emesis, in the ED she had a CT abdomen/pelvis concerning for cystitis as well as a UA (obtained via straight cath) concerning for a UTI. She was initially started on vancomycin and Zosyn later broadened to meropenem. On presentation her labs were notable for leukocytosis of 17, as well as a lactic acidosis of 3.9 that has improved to 2.4 with the administration of 1 L of IV fluids, we are being cautious with fluid repletion given her end-stage renal disease and oliguric status. ID is now consulted for abrupt increase in WBC to 22 w/ associated fever. Daughter at bedside notes that patient had a few episodes of vomiting 2 days ago after initiation of tube feeds, and feels her breathing pattern is different today. Patient is unable to answer questions. CXR  w/ increased vascular congestion. She has been restarted on broad spectrum abx.   Interval History: No adverse events.    Review of Systems   Unable to perform ROS: Patient nonverbal     Objective:     Vital Signs (Most Recent):  Temp: 98.3 °F (36.8 °C) (06/30/24 1527)  Pulse: 97 (06/30/24 1527)  Resp: 18 (06/30/24 1127)  BP: 117/70 (06/30/24 1527)  SpO2: 100 % (06/30/24 1527) Vital Signs (24h Range):  Temp:  [97.7 °F (36.5 °C)-98.5 °F (36.9 °C)] 98.3 °F (36.8 °C)  Pulse:  [] 97  Resp:  [18-20] 18  SpO2:  [98 %-100 %] 100 %  BP: ()/(66-84) 117/70     Weight: 105.5 kg (232 lb 9.4 oz)  Body mass index is 36.43 kg/m².    Estimated Creatinine Clearance: 11.3 mL/min (A) (based on SCr of 7.1 mg/dL (H)).     Physical Exam  Vitals reviewed.   Constitutional:       General: She is not in acute distress.     Appearance: She is not ill-appearing.   HENT:      Head: Normocephalic.      Nose: Nose normal. No congestion.      Mouth/Throat:      Mouth: Mucous membranes are moist. No injury or oral lesions.      Tongue: No lesions.   Eyes:      General:         Right eye: No discharge.         Left eye: No discharge.      Conjunctiva/sclera: Conjunctivae normal.    Cardiovascular:      Rate and Rhythm: Normal rate and regular rhythm.      Pulses: Normal pulses.      Heart sounds: Normal heart sounds. No murmur heard.  Pulmonary:      Effort: Pulmonary effort is normal. No respiratory distress.      Breath sounds: Normal breath sounds. No stridor. No wheezing, rhonchi or rales.   Abdominal:      General: Bowel sounds are normal. There is no distension.      Palpations: Abdomen is soft.      Tenderness: There is no abdominal tenderness. There is no right CVA tenderness or left CVA tenderness.      Comments: Left PEG site without skin breakdown, drainage, redness, swelling.    Musculoskeletal:      Cervical back: Normal range of motion.      Right lower leg: No edema.      Left lower leg: No edema.   Skin:     Findings: No erythema, lesion or rash.      Comments: With right tunneled line. Without surrounding redness, swelling. Without tenderness to palpation.    Neurological:      Mental Status: She is alert. Mental status is at baseline.      Comments: Non-verbal          Significant Labs:   Microbiology Results (last 7 days)       Procedure Component Value Units Date/Time    Blood culture [[id number]] Collected: 06/25/24 1023    Order Status: Completed Specimen: Blood Updated: 06/30/24 1212     Blood Culture, Routine No growth after 5 days.    Blood culture [[id number]] Collected: 06/25/24 1022    Order Status: Completed Specimen: Blood Updated: 06/30/24 1212     Blood Culture, Routine No growth after 5 days.    IV catheter culture [[id number]]  (Abnormal) Collected: 06/27/24 1545    Order Status: Completed Specimen: Catheter Tip, Tunneled Updated: 06/29/24 0720     Aerobic Culture - Cath tip PRESUMPTIVE PROTEUS SPECIES  > 15 colonies  Identification and susceptibility pending      Urine culture [[id number]] Collected: 06/25/24 1452    Order Status: Completed Specimen: Urine Updated: 06/26/24 1802     Urine Culture, Routine Multiple organisms isolated. None in predominance.   Repeat if      clinically necessary.    Narrative:      Specimen Source->Urine    Blood culture [[id number]]     Order Status: Canceled Specimen: Blood     Blood culture [[id number]]     Order Status: Canceled Specimen: Blood     Blood culture [[id number]] Collected: 06/24/24 1056    Order Status: Canceled Specimen: Blood from Line, Jugular, Internal Right     Blood culture [[id number]] Collected: 06/24/24 1056    Order Status: Canceled Specimen: Blood from Line, Jugular, Internal Right     Blood culture [[id number]]     Order Status: Canceled Specimen: Blood     Blood culture [[id number]]     Order Status: Canceled Specimen: Blood             Significant Imaging: I have reviewed all pertinent imaging results/findings within the past 24 hours.

## 2024-06-30 NOTE — PROGRESS NOTES
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 History was given from patient's daughter. She was undergoing dialysis today and noticed she was lethargic, less alert than usual self. Pt completed dialysis and still not acting herself. EMS was called, fever of a 100.  On chart review, she did have an episode of large volume emesis around 1700.  Per EMS, she had a slightly elevated temp 100.0°F, glucose 300s.     In the ED: UA 2+ leuks, >100 WBC, many bacteria, WBC 17, CT abd/pelvis concerning for cystitis. Given vanc/zosyn    Overview/Hospital Course:  53 JARROD admitted to Hospital Medicine service for urosepsis. Vanc/zosyn initiated, found to have staph epi in all 4 bottles, vanc/ertapenem course completed per ID. Vascular Neurology consulted concerning mental status change with the recommendation to initiate ASA 81 QD and to obtain an MRI to r/o new stroke. Per discussion with vascular neurology, MRI findings appear to be expected changes from prior stroke. Nephrology was consulted for regularly scheduled dialysis. Pulm consulted, patient decannulated 4/30. Failed swallow assessment, continuing tube feeds. Ongoing placement difficulties d/t need for accepting HD facility to have sandee lift with 2 personnel to operate. Once patient gets accepted at [facility], [place], next step will be to work with daughter on jail NH placement. SW onboard working on paperwork for long term Medicaid application. H/c c/b new fever, ID reconsulted, CT chest showing bilateral ground glass  opacities, Vanc/meropenem course to be completed 6/17, however patient had further episode of fever and will need continued merem and ID consulted for assistance. CT pan scan to assess for infection. Imaging without signs of infection, patient to complete empiric abx for 5 days. Patient continues fevering despite broad abx, no identified source. Chest x-ray, peripheral smear to assess for other sources. Tunneled catheter removed and replaced via interventional nephrology at recommendation of ID. Catheter tip culture + for proteus. Patient started on ertapenem, to complete a 7 day course, which can be continued at the nursing home.    Interval History: NAEON. Patient to complete 7 day course of ertapened for empiric coverage of TDC infection.    Review of Systems   Unable to perform ROS: Patient nonverbal     Objective:     Vital Signs (Most Recent):  Temp: 97.7 °F (36.5 °C) (06/30/24 0457)  Pulse: 94 (06/30/24 0457)  Resp: 19 (06/30/24 0457)  BP: 124/84 (06/30/24 0457)  SpO2: 98 % (06/30/24 0457) Vital Signs (24h Range):  Temp:  [97.7 °F (36.5 °C)-98.5 °F (36.9 °C)] 97.7 °F (36.5 °C)  Pulse:  [] 94  Resp:  [19-20] 19  SpO2:  [98 %-100 %] 98 %  BP: (107-134)/(71-84) 124/84     Weight: 105.5 kg (232 lb 9.4 oz)  Body mass index is 36.43 kg/m².    Intake/Output Summary (Last 24 hours) at 6/30/2024 0707  Last data filed at 6/29/2024 2143  Gross per 24 hour   Intake 350 ml   Output --   Net 350 ml         Physical Exam  Constitutional:       Appearance: Normal appearance.   HENT:      Head: Normocephalic and atraumatic.      Right Ear: External ear normal.      Left Ear: External ear normal.      Nose: Nose normal.      Mouth/Throat:      Mouth: Mucous membranes are moist.   Eyes:      Extraocular Movements: Extraocular movements intact.      Pupils: Pupils are equal, round, and reactive to light.   Cardiovascular:      Rate and Rhythm: Normal rate and regular rhythm.      Pulses: Normal pulses.      Heart sounds:  Normal heart sounds.   Abdominal:      General: Abdomen is flat.   Musculoskeletal:         General: Normal range of motion.   Skin:     General: Skin is warm.      Capillary Refill: Capillary refill takes less than 2 seconds.   Neurological:      General: No focal deficit present.      Mental Status: She is alert and oriented to person, place, and time.             Significant Labs: All pertinent labs within the past 24 hours have been reviewed.  CBC:   Recent Labs   Lab 06/29/24  0529   WBC 6.90   HGB 9.4*   HCT 30.5*        CMP:   Recent Labs   Lab 06/29/24  0529   *   K 4.3   CL 95   CO2 18*      BUN 20   CREATININE 5.5*   CALCIUM 10.2   PROT 8.3   ALBUMIN 3.1*   BILITOT 0.4   ALKPHOS 77   AST 28   ALT 11   ANIONGAP 15       Significant Imaging: I have reviewed all pertinent imaging results/findings within the past 24 hours.    Assessment/Plan:      * Sepsis  This patient does have evidence of infective focus. My overall impression is sepsis. Source: Skin and Soft Tissue (location Abdominal). Not requiring pressors. Source control achieved by: vanc/meropenem.  Concerns septic source may be PEG site with associated purulent wound despite Wound Care attempts to protect with barriers. PEG placed by General Surgery 2/2024. General Surgery consulted regarding PEG site ordered CT No evidence of infection  US extremities negative for thrombus   CT pan scan w/ IV contrast without evidence of infection  Interventional nephrology consulted for line eval - low suspicion for clot, getting blood cx to r/o infected line  Peripheral smear with findings concerning for infection    Tunneled catheter removed and replaced  Catheter line + for proteus    - Id consulted, ertapenem for a 7 day course, 1g three times weekly following HD    Cervical stenosis of spinal canal  Outpatient MRI and NSGY f/up    Irritant contact dermatitis due friction or contact with other specified body fluids  Wound care following      Debility  Needs:  Wheelchair: patient has a mobility limitation that significantly impairs her ability to participate in one or more mobility related activities of daily living (MRADL's) such as toileting, feeding, dressing, grooming, and bathing in customary locations in the home.  The mobility limitation cannot be sufficiently resolved by the use of a cane or walker.   The use of a manual wheelchair will significantly improve the patient's ability to participate in MRADLS and the patient will use it on regular basis in the home.  Family/pt has expressed her willingness to use a manual wheelchair in the home.  She also has a caregiver who is capable of assisting in mobility.    Mrs Saravia requires a hospital bed due to her requiring positioning of the body in ways not feasible with an ordinary bed to alleviate pain/ is completely immobile /or limited mobility and cannot independently make changes in body position without the use of the bed.  The positioning of the body cannot be sufficiently resolved by the use of pillows and wedges    Patient has a mobility limitation that significantly impairs their ability to participate in one or more mobility related activities of daily living, including toileting. This deficit can be resolved by using a bedside commode. Patient demonstrates mobility limitations that will cause them to be confined to one room at home without bathroom access for up to 30 days. Using a bedside commode will greatly improve the patient's ability to participate in MRADLs     Complication of vascular dialysis catheter  Cath intermittently clogging, not resolving with cath-hedy, going for IR venogram 4/30 with possible exchange.   S/p exchange with IR on 4/30  S/p 2nd exchange for concern of line infection in setting occult infection    Constipation  Stool burden on CT    -continue bowel reg  -monitor for BMs    Moderate malnutrition  Nutrition consulted. Most recent weight and BMI monitored-  "    Measurements:  Wt Readings from Last 1 Encounters:   06/26/24 105.5 kg (232 lb 9.4 oz)   Body mass index is 36.43 kg/m².    Patient has been screened and assessed by RD.    Malnutrition Type:  Context: acute illness or injury  Level: moderate    Malnutrition Characteristic Summary:  Weight Loss (Malnutrition): 10% in 6 months  Subcutaneous Fat (Malnutrition): mild depletion  Muscle Mass (Malnutrition): mild depletion  Fluid Accumulation (Malnutrition): mild    Interventions/Recommendations (treatment strategy):  - TF  - previous history of failed swallow studies    Prolonged QT interval  Limit Qtc prolonging drugs as able    Spastic hemiplegia of right dominant side as late effect of cerebrovascular disease  Important that patient is able to sit in chair for 4h for dialysis placement needs, even if she requires lift/assistance getting into the chair.This patient has Chronic right hemiplegia due to stroke. Physical therapy services has been scheduled. Continue all standard measures for pressure injury prevention and consult wound care for any wounds (chronic or acute).    ESRD (end stage renal disease) on dialysis  Creatine stable for now. BMP reviewed- noted Estimated Creatinine Clearance: 14.6 mL/min (A) (based on SCr of 5.5 mg/dL (H)). according to latest data. Based on current GFR, CKD stage is end stage.  Monitor UOP and serial BMP and adjust therapy as needed. Renally dose meds. Avoid nephrotoxic medications and procedures.    -- HD MWF  -- nephro following  -- Hypophosphatemic on sevelamer  -- [facility].    PEG (percutaneous endoscopic gastrostomy) status  On PEG tube.Wound care with PEG dressing changes.   CT abdomen with PEG in correct location    - Tube feeds as toelrated    Leukocytosis  See "Sepsis"    Type 2 diabetes mellitus with hyperglycemia, with long-term current use of insulin  Adjusting insulin to account for diabetic TF    Patient's FSGs " "are controlled on current medication regimen.  Last A1c reviewed-   Lab Results   Component Value Date    HGBA1C 6.2 (H) 05/27/2024     Most recent fingerstick glucose reviewed-   Recent Labs   Lab 06/29/24  0820 06/29/24  1222 06/29/24  1628 06/29/24  2132   POCTGLUCOSE 135* 137* 143* 119*       Current correctional scale  Medium  Maintain anti-hyperglycemic dose as follows-   Antihyperglycemics (From admission, onward)      Start     Stop Route Frequency Ordered    06/09/24 2100  insulin glargine U-100 (Lantus) pen 13 Units         -- SubQ Nightly 06/09/24 0756    06/09/24 1255  insulin aspart U-100 pen 0-10 Units         -- SubQ Before meals & nightly PRN 06/09/24 1155          Hold Oral hypoglycemics while patient is in the hospital.  POCT glucose checks   Continue tube feeds    Hyponatremia  Patient has hyponatremia which is uncontrolled,We will aim to correct the sodium by 4-6mEq in 24 hours. We will monitor sodium Daily. The hyponatremia is due to ESRD. Discussed with nephrology, dialysate bath adjusted to account for and correct hyponatremia.  No results for input(s): "NA" in the last 24 hours.      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 Pt underwent extensive course, currently still healing from this, but no longer has wound vac. Picture in media tabs    - Wound care while inpatient  - NH with wound care orders      VTE Risk Mitigation (From admission, onward)           Ordered     heparin (porcine) injection 1,000 Units  As needed (PRN)         06/21/24 0828     heparin (porcine) injection 1,000 Units  As needed (PRN)         06/10/24 0035     heparin (porcine) injection 3,000 Units  As needed (PRN)         04/17/24 0825                    Discharge Planning   ZEKE: 7/1/2024     Code Status: Full Code   Is the patient medically ready for discharge?: No    Reason for patient still in hospital (select all that apply): Patient trending condition  Discharge " Plan A: New Nursing Home placement - senior care care facility   Discharge Delays: (!) Payor Issues              Jatin Hutchins MD  Department of Hospital Medicine   [facility] - Telemetry Stepdown

## 2024-06-30 NOTE — PLAN OF CARE
Problem: Infection  Goal: Absence of Infection Signs and Symptoms  6/30/2024 1801 by Mary Powell RN  Outcome: Progressing  6/30/2024 1801 by Mary Powell RN  Outcome: Progressing     Problem: Skin Injury Risk Increased  Goal: Skin Health and Integrity  6/30/2024 1801 by Mary Powell RN  Outcome: Progressing  6/30/2024 1801 by Mary Powell RN  Outcome: Progressing     Problem: Adult Inpatient Plan of Care  Goal: Plan of Care Review  6/30/2024 1801 by Mary Powell RN  Outcome: Progressing  6/30/2024 1801 by Mary Powell RN  Outcome: Progressing     Problem: Chronic Kidney Disease  Goal: Optimal Coping with Chronic Illness  6/30/2024 1801 by Mary Powell RN  Outcome: Progressing  6/30/2024 1801 by Mary Powell RN  Outcome: Progressing  Goal: Electrolyte Balance  6/30/2024 1801 by Mary Powell RN  Outcome: Progressing  6/30/2024 1801 by Mary Powell RN  Outcome: Progressing  Goal: Fluid Balance  6/30/2024 1801 by Mary Powell RN  Outcome: Progressing  6/30/2024 1801 by Mary Powell RN  Outcome: Progressing  Goal: Optimal Functional Ability  6/30/2024 1801 by Mary Powell RN  Outcome: Progressing  6/30/2024 1801 by Mary Powell RN  Outcome: Progressing  Goal: Absence of Anemia Signs and Symptoms  6/30/2024 1801 by Mary Powell RN  Outcome: Progressing  6/30/2024 1801 by Mary Powell RN  Outcome: Progressing  Goal: Optimal Oral Intake  6/30/2024 1801 by Mary Powell RN  Outcome: Progressing  6/30/2024 1801 by Mary Powell RN  Outcome: Progressing  Goal: Acceptable Pain Control  6/30/2024 1801 by Mary Powell RN  Outcome: Progressing  6/30/2024 1801 by Mary Powell RN  Outcome: Progressing  Goal: Minimize Renal Failure Effects  6/30/2024 1801 by Mary Powell RN  Outcome: Progressing  6/30/2024 1801 by Mary Powell RN  Outcome: Progressing     Problem: Hemodialysis  Goal: Safe, Effective Therapy Delivery  6/30/2024 1801 by Mary Powell RN  Outcome:  Progressing  6/30/2024 1801 by Mary Powell RN  Outcome: Progressing  Goal: Effective Tissue Perfusion  6/30/2024 1801 by Mary Powell RN  Outcome: Progressing  6/30/2024 1801 by Mary Powell RN  Outcome: Progressing  Goal: Absence of Infection Signs and Symptoms  6/30/2024 1801 by Mary Powell RN  Outcome: Progressing  6/30/2024 1801 by Mary Powell RN  Outcome: Progressing

## 2024-06-30 NOTE — ASSESSMENT & PLAN NOTE
Adjusting insulin to account for diabetic TF    Patient's FSGs are controlled on current medication regimen.  Last A1c reviewed-   Lab Results   Component Value Date    HGBA1C 6.2 (H) 05/27/2024     Most recent fingerstick glucose reviewed-   Recent Labs   Lab 06/29/24  0820 06/29/24  1222 06/29/24  1628 06/29/24  2132   POCTGLUCOSE 135* 137* 143* 119*       Current correctional scale  Medium  Maintain anti-hyperglycemic dose as follows-   Antihyperglycemics (From admission, onward)    Start     Stop Route Frequency Ordered    06/09/24 2100  insulin glargine U-100 (Lantus) pen 13 Units         -- SubQ Nightly 06/09/24 0756    06/09/24 1255  insulin aspart U-100 pen 0-10 Units         -- SubQ Before meals & nightly PRN 06/09/24 1155        Hold Oral hypoglycemics while patient is in the hospital.  POCT glucose checks   Continue tube feeds

## 2024-06-30 NOTE — NURSING
Nurses Note -- 4 Eyes      6/30/2024   03:42 AM      Skin assessed during: Q Shift Change      [] No Altered Skin Integrity Present    []Prevention Measures Documented      [x] Yes- Altered Skin Integrity Present or Discovered   [] LDA Added if Not in Epic (Describe Wound)   [] New Altered Skin Integrity was Present on Admit and Documented in LDA   [] Wound Image Taken    Wound Care Consulted? Yes    Attending Nurse:  Charisma Eastman RN/Staff Member: Anuja

## 2024-06-30 NOTE — SUBJECTIVE & OBJECTIVE
Interval History: No adverse events.    Review of Systems   Unable to perform ROS: Patient nonverbal     Objective:     Vital Signs (Most Recent):  Temp: 98.3 °F (36.8 °C) (06/30/24 1527)  Pulse: 97 (06/30/24 1527)  Resp: 18 (06/30/24 1127)  BP: 117/70 (06/30/24 1527)  SpO2: 100 % (06/30/24 1527) Vital Signs (24h Range):  Temp:  [97.7 °F (36.5 °C)-98.5 °F (36.9 °C)] 98.3 °F (36.8 °C)  Pulse:  [] 97  Resp:  [18-20] 18  SpO2:  [98 %-100 %] 100 %  BP: ()/(66-84) 117/70     Weight: 105.5 kg (232 lb 9.4 oz)  Body mass index is 36.43 kg/m².    Estimated Creatinine Clearance: 11.3 mL/min (A) (based on SCr of 7.1 mg/dL (H)).     Physical Exam  Vitals reviewed.   Constitutional:       General: She is not in acute distress.     Appearance: She is not ill-appearing.   HENT:      Head: Normocephalic.      Nose: Nose normal. No congestion.      Mouth/Throat:      Mouth: Mucous membranes are moist. No injury or oral lesions.      Tongue: No lesions.   Eyes:      General:         Right eye: No discharge.         Left eye: No discharge.      Conjunctiva/sclera: Conjunctivae normal.   Cardiovascular:      Rate and Rhythm: Normal rate and regular rhythm.      Pulses: Normal pulses.      Heart sounds: Normal heart sounds. No murmur heard.  Pulmonary:      Effort: Pulmonary effort is normal. No respiratory distress.      Breath sounds: Normal breath sounds. No stridor. No wheezing, rhonchi or rales.   Abdominal:      General: Bowel sounds are normal. There is no distension.      Palpations: Abdomen is soft.      Tenderness: There is no abdominal tenderness. There is no right CVA tenderness or left CVA tenderness.      Comments: Left PEG site without skin breakdown, drainage, redness, swelling.    Musculoskeletal:      Cervical back: Normal range of motion.      Right lower leg: No edema.      Left lower leg: No edema.   Skin:     Findings: No erythema, lesion or rash.      Comments: With right tunneled line. Without  surrounding redness, swelling. Without tenderness to palpation.    Neurological:      Mental Status: She is alert. Mental status is at baseline.      Comments: Non-verbal          Significant Labs:   Microbiology Results (last 7 days)       Procedure Component Value Units Date/Time    Blood culture [[id number]] Collected: 06/25/24 1023    Order Status: Completed Specimen: Blood Updated: 06/30/24 1212     Blood Culture, Routine No growth after 5 days.    Blood culture [[id number]] Collected: 06/25/24 1022    Order Status: Completed Specimen: Blood Updated: 06/30/24 1212     Blood Culture, Routine No growth after 5 days.    IV catheter culture [[id number]]  (Abnormal) Collected: 06/27/24 1545    Order Status: Completed Specimen: Catheter Tip, Tunneled Updated: 06/29/24 0720     Aerobic Culture - Cath tip PRESUMPTIVE PROTEUS SPECIES  > 15 colonies  Identification and susceptibility pending      Urine culture [[id number]] Collected: 06/25/24 1452    Order Status: Completed Specimen: Urine Updated: 06/26/24 1802     Urine Culture, Routine Multiple organisms isolated. None in predominance.  Repeat if      clinically necessary.    Narrative:      Specimen Source->Urine    Blood culture [[id number]]     Order Status: Canceled Specimen: Blood     Blood culture [[id number]]     Order Status: Canceled Specimen: Blood     Blood culture [[id number]] Collected: 06/24/24 1056    Order Status: Canceled Specimen: Blood from Line, Jugular, Internal Right     Blood culture [[id number]] Collected: 06/24/24 1056    Order Status: Canceled Specimen: Blood from Line, Jugular, Internal Right     Blood culture [[id number]]     Order Status: Canceled Specimen: Blood     Blood culture [[id number]]     Order Status: Canceled Specimen: Blood             Significant Imaging: I have reviewed all pertinent imaging results/findings within the past 24 hours.

## 2024-06-30 NOTE — ASSESSMENT & PLAN NOTE
53 year old with prolonged hospitalization, PMH including ros's gangrene (1/24), CVA with deficits (nonverbal), requiring trach/PEG, DM, ESRD on HD, who originally presented to [facility] with cf AMS ( in April). ID is re-consulted as she developed some leukocytosis. Blood cultures were negative.  Her tunneled line was removed and sent for culture and a new one was placed immediately in the same location.  Leukocytosis has since resolved.  Cultures from the removed line are positive for ESBL Proteus.      Unsure if this was just colonization of the line or if there was any infection in the surrounding soft tissues.  A new line was placed in the same location.  So I will plan to give a short course of antibiotics out of an abundance of precaution.      Plan  Start IV ertapenem.  500 mg IV q day while inpatient.  Switch to 1 gram, 3 times a week after dialysis after discharge.  Recommend the equivalent of 7 days total of therapy.

## 2024-06-30 NOTE — ASSESSMENT & PLAN NOTE
This patient does have evidence of infective focus. My overall impression is sepsis. Source: Skin and Soft Tissue (location Abdominal). Not requiring pressors. Source control achieved by: vanc/meropenem.  Concerns septic source may be PEG site with associated purulent wound despite Wound Care attempts to protect with barriers. PEG placed by General Surgery 2/2024. General Surgery consulted regarding PEG site ordered CT No evidence of infection  US extremities negative for thrombus   CT pan scan w/ IV contrast without evidence of infection  Interventional nephrology consulted for line eval - low suspicion for clot, getting blood cx to r/o infected line  Peripheral smear with findings concerning for infection    Tunneled catheter removed and replaced  Catheter line + for proteus    - Id consulted, ertapenem for a 7 day course, 1g three times weekly following HD

## 2024-06-30 NOTE — SUBJECTIVE & OBJECTIVE
Cysto, Prostate Biopsy & US - 45 mins under General per Dr. Misty Najera verbal instructions    STV 11/28/2022 @ 2:15pm (12:15pm arrival)    PAT: Visit 11/15/2022 @ 1:30pm    **STOP BLOOD THINNERS 11/21/2022**    NPO: Midnight    US confirmed w/ Frank Johnson 10/13/2022    Bowel Prep: Fleets enema - instructions reviewed w/ patient. .. antibiotic to be called in once rectal swab & urine culture results are back    PO/Results: 12/6/2022 @ 11:30am    All surgery information given to patient over the phone. Pt verbalized an understanding and all questions were answered. Per patient request, all surgery info emailed to him at [email or web address] Interval History: NAEON. Patient to complete 7 day course of ertapened for empiric coverage of TDC infection.    Review of Systems   Unable to perform ROS: Patient nonverbal     Objective:     Vital Signs (Most Recent):  Temp: 97.7 °F (36.5 °C) (06/30/24 0457)  Pulse: 94 (06/30/24 0457)  Resp: 19 (06/30/24 0457)  BP: 124/84 (06/30/24 0457)  SpO2: 98 % (06/30/24 0457) Vital Signs (24h Range):  Temp:  [97.7 °F (36.5 °C)-98.5 °F (36.9 °C)] 97.7 °F (36.5 °C)  Pulse:  [] 94  Resp:  [19-20] 19  SpO2:  [98 %-100 %] 98 %  BP: (107-134)/(71-84) 124/84     Weight: 105.5 kg (232 lb 9.4 oz)  Body mass index is 36.43 kg/m².    Intake/Output Summary (Last 24 hours) at 6/30/2024 0707  Last data filed at 6/29/2024 2143  Gross per 24 hour   Intake 350 ml   Output --   Net 350 ml         Physical Exam  Constitutional:       Appearance: Normal appearance.   HENT:      Head: Normocephalic and atraumatic.      Right Ear: External ear normal.      Left Ear: External ear normal.      Nose: Nose normal.      Mouth/Throat:      Mouth: Mucous membranes are moist.   Eyes:      Extraocular Movements: Extraocular movements intact.      Pupils: Pupils are equal, round, and reactive to light.   Cardiovascular:      Rate and Rhythm: Normal rate and regular rhythm.      Pulses: Normal pulses.      Heart sounds: Normal heart sounds.   Abdominal:      General: Abdomen is flat.   Musculoskeletal:         General: Normal range of motion.   Skin:     General: Skin is warm.      Capillary Refill: Capillary refill takes less than 2 seconds.   Neurological:      General: No focal deficit present.      Mental Status: She is alert and oriented to person, place, and time.             Significant Labs: All pertinent labs within the past 24 hours have been reviewed.  CBC:   Recent Labs   Lab 06/29/24  0529   WBC 6.90   HGB 9.4*   HCT 30.5*        CMP:   Recent Labs   Lab 06/29/24  0529   *   K 4.3   CL 95   CO2 18*      BUN 20    CREATININE 5.5*   CALCIUM 10.2   PROT 8.3   ALBUMIN 3.1*   BILITOT 0.4   ALKPHOS 77   AST 28   ALT 11   ANIONGAP 15       Significant Imaging: I have reviewed all pertinent imaging results/findings within the past 24 hours.

## 2024-07-01 LAB
ALBUMIN SERPL BCP-MCNC: 3 G/DL (ref 3.5–5.2)
ALP SERPL-CCNC: 84 U/L (ref 55–135)
ALT SERPL W/O P-5'-P-CCNC: 13 U/L (ref 10–44)
ANION GAP SERPL CALC-SCNC: 16 MMOL/L (ref 8–16)
AST SERPL-CCNC: 22 U/L (ref 10–40)
BASOPHILS # BLD AUTO: 0.06 K/UL (ref 0–0.2)
BASOPHILS NFR BLD: 0.8 % (ref 0–1.9)
BILIRUB SERPL-MCNC: 0.4 MG/DL (ref 0.1–1)
BUN SERPL-MCNC: 36 MG/DL (ref 6–20)
CALCIUM SERPL-MCNC: 10.1 MG/DL (ref 8.7–10.5)
CHLORIDE SERPL-SCNC: 92 MMOL/L (ref 95–110)
CO2 SERPL-SCNC: 20 MMOL/L (ref 23–29)
CREAT SERPL-MCNC: 8.3 MG/DL (ref 0.5–1.4)
DIFFERENTIAL METHOD BLD: ABNORMAL
EOSINOPHIL # BLD AUTO: 0.6 K/UL (ref 0–0.5)
EOSINOPHIL NFR BLD: 8.1 % (ref 0–8)
ERYTHROCYTE [DISTWIDTH] IN BLOOD BY AUTOMATED COUNT: 16.7 % (ref 11.5–14.5)
EST. GFR  (NO RACE VARIABLE): 5.3 ML/MIN/1.73 M^2
GLUCOSE SERPL-MCNC: 84 MG/DL (ref 70–110)
HCT VFR BLD AUTO: 31.6 % (ref 37–48.5)
HGB BLD-MCNC: 9.6 G/DL (ref 12–16)
IMM GRANULOCYTES # BLD AUTO: 0.04 K/UL (ref 0–0.04)
IMM GRANULOCYTES NFR BLD AUTO: 0.5 % (ref 0–0.5)
LYMPHOCYTES # BLD AUTO: 1.9 K/UL (ref 1–4.8)
LYMPHOCYTES NFR BLD: 25.8 % (ref 18–48)
MAGNESIUM SERPL-MCNC: 2.2 MG/DL (ref 1.6–2.6)
MCH RBC QN AUTO: 23.8 PG (ref 27–31)
MCHC RBC AUTO-ENTMCNC: 30.4 G/DL (ref 32–36)
MCV RBC AUTO: 78 FL (ref 82–98)
MONOCYTES # BLD AUTO: 0.9 K/UL (ref 0.3–1)
MONOCYTES NFR BLD: 12.7 % (ref 4–15)
NEUTROPHILS # BLD AUTO: 3.9 K/UL (ref 1.8–7.7)
NEUTROPHILS NFR BLD: 52.1 % (ref 38–73)
NRBC BLD-RTO: 0 /100 WBC
PHOSPHATE SERPL-MCNC: 5.9 MG/DL (ref 2.7–4.5)
PLATELET # BLD AUTO: 293 K/UL (ref 150–450)
PMV BLD AUTO: 9.7 FL (ref 9.2–12.9)
POCT GLUCOSE: 113 MG/DL (ref 70–110)
POCT GLUCOSE: 118 MG/DL (ref 70–110)
POTASSIUM SERPL-SCNC: 4.2 MMOL/L (ref 3.5–5.1)
PROT SERPL-MCNC: 7.8 G/DL (ref 6–8.4)
RBC # BLD AUTO: 4.04 M/UL (ref 4–5.4)
SODIUM SERPL-SCNC: 128 MMOL/L (ref 136–145)
WBC # BLD AUTO: 7.41 K/UL (ref 3.9–12.7)

## 2024-07-01 PROCEDURE — 63600175 PHARM REV CODE 636 W HCPCS: Performed by: NURSE PRACTITIONER

## 2024-07-01 PROCEDURE — 63600175 PHARM REV CODE 636 W HCPCS: Performed by: INTERNAL MEDICINE

## 2024-07-01 PROCEDURE — 20600001 HC STEP DOWN PRIVATE ROOM

## 2024-07-01 PROCEDURE — 25000003 PHARM REV CODE 250

## 2024-07-01 PROCEDURE — 83735 ASSAY OF MAGNESIUM: CPT

## 2024-07-01 PROCEDURE — 36415 COLL VENOUS BLD VENIPUNCTURE: CPT

## 2024-07-01 PROCEDURE — 84100 ASSAY OF PHOSPHORUS: CPT

## 2024-07-01 PROCEDURE — 85025 COMPLETE CBC W/AUTO DIFF WBC: CPT

## 2024-07-01 PROCEDURE — 80100014 HC HEMODIALYSIS 1:1

## 2024-07-01 PROCEDURE — 80053 COMPREHEN METABOLIC PANEL: CPT

## 2024-07-01 PROCEDURE — 25000003 PHARM REV CODE 250: Performed by: STUDENT IN AN ORGANIZED HEALTH CARE EDUCATION/TRAINING PROGRAM

## 2024-07-01 PROCEDURE — 25000003 PHARM REV CODE 250: Performed by: INTERNAL MEDICINE

## 2024-07-01 PROCEDURE — 90935 HEMODIALYSIS ONE EVALUATION: CPT | Mod: ,,, | Performed by: NURSE PRACTITIONER

## 2024-07-01 PROCEDURE — 27000207 HC ISOLATION

## 2024-07-01 RX ORDER — FLUCONAZOLE 40 MG/ML
200 POWDER, FOR SUSPENSION ORAL NIGHTLY
Start: 2024-07-01 | End: 2024-07-01

## 2024-07-01 RX ORDER — FLUCONAZOLE 40 MG/ML
100 POWDER, FOR SUSPENSION ORAL NIGHTLY
Start: 2024-07-01 | End: 2024-07-07 | Stop reason: HOSPADM

## 2024-07-01 RX ADMIN — HEPARIN SODIUM 1000 UNITS: 1000 INJECTION, SOLUTION INTRAVENOUS; SUBCUTANEOUS at 12:07

## 2024-07-01 RX ADMIN — POLYETHYLENE GLYCOL 3350 17 G: 17 POWDER, FOR SOLUTION ORAL at 08:07

## 2024-07-01 RX ADMIN — FLUCONAZOLE 200 MG: 40 POWDER, FOR SUSPENSION ORAL at 08:07

## 2024-07-01 RX ADMIN — INSULIN GLARGINE 13 UNITS: 100 INJECTION, SOLUTION SUBCUTANEOUS at 08:07

## 2024-07-01 RX ADMIN — PANTOPRAZOLE SODIUM 40 MG: 40 GRANULE, DELAYED RELEASE ORAL at 08:07

## 2024-07-01 RX ADMIN — ASPIRIN 81 MG CHEWABLE TABLET 81 MG: 81 TABLET CHEWABLE at 08:07

## 2024-07-01 RX ADMIN — Medication 500 MG: at 08:07

## 2024-07-01 RX ADMIN — METOPROLOL TARTRATE 25 MG: 25 TABLET, FILM COATED ORAL at 08:07

## 2024-07-01 RX ADMIN — HEPARIN SODIUM 3000 UNITS: 1000 INJECTION, SOLUTION INTRAVENOUS; SUBCUTANEOUS at 09:07

## 2024-07-01 RX ADMIN — ERYTHROPOIETIN 6100 UNITS: 10000 INJECTION, SOLUTION INTRAVENOUS; SUBCUTANEOUS at 09:07

## 2024-07-01 RX ADMIN — ATORVASTATIN CALCIUM 40 MG: 40 TABLET, FILM COATED ORAL at 08:07

## 2024-07-01 RX ADMIN — ERTAPENEM 500 MG: 1 INJECTION INTRAMUSCULAR; INTRAVENOUS at 08:07

## 2024-07-01 NOTE — PLAN OF CARE
CM notified by the Nephrology SW that the recommended antibiotic Ertopenem which needs to be given with dialysis  is not available at [facility]. This CM asked if an alternative abx could be given and if not when the last day of abx was. Will Continue to follow.    15: 00PM  CM asked team if an alternative abx was found ,they stated that they have reached out to ID but as off now the Ertopenem is scheduled to end 7/5/24. Call placed to [facility] to update them on above, left voicemail requesting callback.    Sara Loredo, RN  Ext [phone]

## 2024-07-01 NOTE — PROGRESS NOTES
[facility],  Psychiatric Progress Note      Impression:     Misty Parham is a 37-year-old female admitted to [facility] on 4/12/2021.  She was admitted under MI commitment with Bhargav from [address] [place] [address] where she had been hospitalized since 4/7/2021 due to parotitis.  She had been hospitalized on the psychiatric unit since 2/7/2021 after taking 4-5 tabs of Unisom, 4-5 tabs of Ibuprofen and 4-5 tabs of Tylenol twice daily for 3 months in an attempt to commit suicide.  She was experiencing auditory hallucinations of Satan's voice commanding her to commit suicide.  She had not been taking medications for several weeks prior to admission.       During her hospitalization, her provisional discharge was revoked.  She began refusing medications 2/23 and took them inconsistently until 3/20.  She took them consistently thereafter.     She was spending nearly all of her time in her room and showed little motivation for treatment, so a room lock out was initiated as it has been during previous hospitalizations, from 9:30 AM - 12 PM, 1 PM - 3 PM and 5 PM to 9 PM.  She spent some time in the milieu reading and watching TV but did not attend any groups as she stated she had been to them during previous hospitalizations and did not find them beneficial.  She intermittently superficially scratched her forearms as a form of self-injury without suicidal intent.  A finger foods diet was ordered.  Staff regularly conducted room searches to remove potentially harmful objects from her room, as she often hid foil juice tops and plasticware.  On 3/25 she reported she had attempted to strangle herself with linens on 3/23 and 3/24, and linens were removed from her room.  As of 4/6 she contracted for safety with her linens.  She remained in a room directly across from the desk throughout her hospitalization.  Individual therapy was initiated shortly before her discharge to medical  Patient arrived in a bed to dialysis unit. Contact isolation maintained  Report received from primary nurse.  VS's per dialysis Flowsheet.     Hemodialysis initiated using the following:     Dialysis Access: RIJ     Will Maintain telemetry and blood pressure monitoring throughout treatment.  Refer to dialysis flowsheet and MAR for details.   and she stated that it was helpful.     She was readmitted to the psychiatric unit following treatment with IV antibiotics and medical stabilization.   Since admission, all medications were continued including scheduled Seroquel XR, Protriptyline, Wellbutrin XL, Effexor ER, and PRNs of Klonopin, Hydroxyzine, Seroquel and Zyprexa.  Her Lamictal titration was continued.  She reports some improvement in symptoms with reduced suicidal ideation and reduced urges to engage in self-injury.  She has more range in affect.  She reports auditory hallucinations of Satan's voice commanding her to commit suicide and making derogatory comments remain fairly constant, at the volume of a whisper.         Diagnoses:     Borderline personality disorder  Major depressive disorder, recurrent, severe with psychotic features  Generalized anxiety disorder  Right facial cellulitis with underlying parotitis, treatment with antibiotics         Plan:     Medications.  Increase Lamcital to 200 mg daily.  Continue Seroquel  mg BID with a back up of IM Zyprexa per Bhargav.  Continue Protriptyline 10 mg at HS.  Continue Wellbutrin  mg daily.  Continue Effexor  mg daily.  Continue Rozerem 8 mg at HS.  Continue PRNs of Klonopin, Hydroxyzine, Seroquel and Zyprexa.  Scripts for discharge meds sent to [facility] on 4/16.    Individual therapy on the unit.       Patient is currently under civil commitment MI with Bhargav in [facility] until 5/26/2021.  The limit on her commitment extension has been reached, so a new petition for commitment MI with Bhargav has been filed and her first court appearance will be 5/18 at 1 PM.  Current [facility] medications are Zyprexa, Seroquel, Latuda and Abilify.  She also has a current [facility] order, but this it not being pursued during her new commitment.      Her mother indicated that the patient is not welcome to reside with her.  Historically she did not put forth efforts to  "engage in treatment during IRTS placement.  CADI funding and adult foster care placement was pursued.  CADI interview was completed 3/16.  She had an interview with [facility] 4/1/21 and has [facility] funding in place.  She has been accepted at [facility] with admission 4/20 and will stay there until adult foster care placement is secured.  On 4/16, reviewed and completed extensive admission paperwork for [facility], > 35 minutes.        Attestation:  Patient has been seen and evaluated by me, WILLIAN Sánchez CNP  The patient was counseled on nature of illness and treatment plan/options  Care was coordinated with treatment team       Clinical Global Impressions  First:  Considering your total clinical experience with this particular patient population, how severe are the patient's symptoms at this time?: 6 (04/15/21 0626)  Compared to the patient's condition at the START of treatment, this patient's condition is: 3 (04/15/21 0626)  Most recent:  Considering your total clinical experience with this particular patient population, how severe are the patient's symptoms at this time?: 6 (04/15/21 0626)  Compared to the patient's condition at the START of treatment, this patient's condition is: 3 (04/15/21 0626)           Interim History:     The patient's care was discussed with the treatment team and chart notes were reviewed.  Pt was documented as sleeping during most of the overnight shift.  She has been spending some time in the milieu watching TV and reading.  She had an interview with [facility] yesterday and was accepted.  She is concerned about having a roommate there and describes her mood as \"overwhelmed\" related to this.  She reports suicidal thoughts are \"kind of low.\"  She contracts for safety outside the hospital if she does not have her medications in her possession.  She reports \"low\" urges to engage in self-injury by scratching.  States that auditory hallucinations are " "at the volume of a whisper and fairly constant.  Reviewed and completed paperwork for admission to [facility].         Medications:     Current Facility-Administered Medications   Medication     [START ON 4/18/2021] - Skin Test Reading -     acetaminophen (TYLENOL) tablet 650 mg     amoxicillin-clavulanate (AUGMENTIN) 875-125 MG per tablet 1 tablet     artificial saliva (BIOTENE MT) solution 2 spray     buPROPion (WELLBUTRIN XL) 24 hr tablet 300 mg     clonazePAM (klonoPIN) tablet 0.5 mg     hydrOXYzine (ATARAX) tablet 25 mg     lamoTRIgine (LaMICtal) tablet 100 mg     QUEtiapine ER (SEROquel XR) 24 hr tablet 300 mg    Or     OLANZapine (zyPREXA) injection 10 mg     OLANZapine (zyPREXA) tablet 10 mg    Or     OLANZapine (zyPREXA) injection 10 mg     polyethylene glycol (MIRALAX) Packet 17 g     protriptyline (VIVACTIL) tablet 10 mg     QUEtiapine (SEROquel) tablet 25 mg     ramelteon (ROZEREM) tablet 8 mg     rOPINIRole (REQUIP) tablet 1 mg     sennosides (SENOKOT) tablet 2 tablet     venlafaxine (EFFEXOR-XR) 24 hr capsule 225 mg             Allergies:   No Known Allergies         Psychiatric Examination:     /81 (BP Location: Right leg)   Pulse 108   Temp 98.5  F (36.9  C)   Resp 17   SpO2 96%     Appearance:  alert, adequate grooming/hygiene   Attitude:  cooperative   Eye Contact:  good  Mood: \"overwhelmed\"  Affect:  intensity is blunted but some smiling  Speech:  quiet, soft  Language: fluent and intact in English  Psychomotor, Gait, Musculoskeletal:  no evidence of tardive dyskinesia, dystonia, or tics  Thought Process:  goal oriented, linear, less than logical  Associations:  no loose associations  Thought Content:  denies homicidal ideation, denies visual hallucinations, reports auditory hallucinations of Channing's voice commanding her to commit suicide and making derogatory comments, endorses \"low\" suicidal ideation and contracts for safety, endorses reduced urges to cut self as a form of " self-injury but not with suicidal intent  Insight:  limited  Judgement:  limited  Oriented to:  month/year, time, person, and place   Attention Span and Concentration:  fair, improving  Recent and Remote Memory:  intact  Fund of Knowledge:  appropriate          Labs:     No results found for this or any previous visit (from the past 24 hour(s)).

## 2024-07-01 NOTE — PROGRESS NOTES
07/01/24 1300   Post-Hemodialysis Assessment   Rinseback Volume (mL) 250 mL   Blood Volume Processed (Liters) 56.6 L   Dialyzer Clearance Heavily streaked   Duration of Treatment 210 minutes   Additional Fluid Intake (mL) 350 mL   Total UF (mL) 2019 mL   Net Fluid Removal 1419   Patient Response to Treatment tolerated   Post-Hemodialysis Comments see notes     HD TX complete. Pt tolerated well. NAD. VSS. Net removal 1419 ml. Pt awake, alert. Report given to primary nurse. Pt returned to room via bed escorted by pt transport staff.

## 2024-07-01 NOTE — ASSESSMENT & PLAN NOTE
This patient does have evidence of infective focus. My overall impression is sepsis. Source: Skin and Soft Tissue (location Abdominal). Not requiring pressors. Source control achieved by: vanc/meropenem.  Concerns septic source may be PEG site with associated purulent wound despite Wound Care attempts to protect with barriers. PEG placed by General Surgery 2/2024. General Surgery consulted regarding PEG site ordered CT No evidence of infection  US extremities negative for thrombus   CT pan scan w/ IV contrast without evidence of infection  Interventional nephrology consulted for line eval - low suspicion for clot, getting blood cx to r/o infected line  Peripheral smear with findings concerning for infection    Tunneled catheter removed and replaced  Catheter line + for proteus    - Id consulted, Started IV ertapenem, now on day 3 of 7.  500 mg IV q day while inpatient.  Switch to 1 gram, 3 times a week after dialysis after discharge.  Recommend the equivalent of 7 days total of therapy.

## 2024-07-01 NOTE — ASSESSMENT & PLAN NOTE
We will monitor sodium Daily. The hyponatremia is due to ESRD. Discussed with nephrology, dialysate bath adjusted to account for and correct hyponatremia. Hyponatremia stable at 128.  Recent Labs   Lab 07/01/24  0653   *

## 2024-07-01 NOTE — PROGRESS NOTES
[facility] STAFF HEMODIALYSIS NOTE     Patient currently on hemodialysis for removal of uremic toxins and volume.     Patient seen and evaluated on hemodialysis, tolerating treatment, see HD flowsheet for vitals and assessments.     Labs have been reviewed and the dialysate bath has been adjusted.        Assessment/Plan:     -Patient seen on HD, tolerating treatment well, w/o complaints   -Patient with catheter exchange performed on 6/27, cultures from removed line positive for ESBL Proteus and patient with intermittent fevers and leukocytosis which have since resolved. Uncertain if line colonization versus surrounding soft tissue infection, therefore started on IV ertapenem x 7 days per ID  -UF goal of 2 L as tolerated  -Na 128, recommend 1 L fluid restriction daily   -Renal diet, if not NPO   -Strict I/O's and daily weights  -Daily renal function panels  -Keep MAP >65 while on HD   -Hgb goal 10-11, hgb 9.6, continue Epo  -Will continue to follow while inpatient     Delfina Shi, ARIANE   Nephrology

## 2024-07-01 NOTE — PLAN OF CARE
Problem: Infection  Goal: Absence of Infection Signs and Symptoms  Outcome: Progressing     Problem: Skin Injury Risk Increased  Goal: Skin Health and Integrity  Outcome: Progressing     Problem: Adult Inpatient Plan of Care  Goal: Plan of Care Review  Outcome: Progressing     Problem: Hemodialysis  Goal: Absence of Infection Signs and Symptoms  Outcome: Progressing   Pt rested well. VS WNL. No ss of pain or distress. Call light within reach. Bed in low position. Tube feed changed at 2300. Will continue POC.

## 2024-07-01 NOTE — SUBJECTIVE & OBJECTIVE
Interval History: ESDRAS MULLER. Medically stable at discharge. Infectious disease consulted and recommended ertapenem 1 g 3x week after dialysis after discharge for equivalent of 7 days total of therapy. Fluconazole renally adjusted.    Review of Systems   Unable to perform ROS: Patient nonverbal     Objective:     Vital Signs (Most Recent):  Temp: 97.7 °F (36.5 °C) (07/01/24 0721)  Pulse: 91 (07/01/24 1000)  Resp: 16 (07/01/24 0935)  BP: 115/76 (07/01/24 1000)  SpO2: 98 % (07/01/24 0721) Vital Signs (24h Range):  Temp:  [97.3 °F (36.3 °C)-98.7 °F (37.1 °C)] 97.7 °F (36.5 °C)  Pulse:  [] 91  Resp:  [16-20] 16  SpO2:  [98 %-100 %] 98 %  BP: ()/(65-86) 115/76     Weight: 105.5 kg (232 lb 9.4 oz)  Body mass index is 36.43 kg/m².    Intake/Output Summary (Last 24 hours) at 7/1/2024 1009  Last data filed at 7/1/2024 0600  Gross per 24 hour   Intake 707.3 ml   Output --   Net 707.3 ml         Physical Exam  Constitutional:       Appearance: Normal appearance.   HENT:      Head: Normocephalic and atraumatic.      Right Ear: External ear normal.      Left Ear: External ear normal.      Nose: Nose normal.      Mouth/Throat:      Mouth: Mucous membranes are moist.   Eyes:      Extraocular Movements: Extraocular movements intact.      Pupils: Pupils are equal, round, and reactive to light.   Cardiovascular:      Rate and Rhythm: Normal rate and regular rhythm.      Pulses: Normal pulses.      Heart sounds: Normal heart sounds.   Abdominal:      General: Abdomen is flat.   Musculoskeletal:         General: Normal range of motion.   Skin:     General: Skin is warm.      Capillary Refill: Capillary refill takes less than 2 seconds.   Neurological:      General: No focal deficit present.      Mental Status: She is alert and oriented to person, place, and time.             Significant Labs: All pertinent labs within the past 24 hours have been reviewed.    Significant Imaging: I have reviewed all pertinent imaging  results/findings within the past 24 hours.

## 2024-07-01 NOTE — ASSESSMENT & PLAN NOTE
Adjusting insulin to account for diabetic TF. POCG in good range.    Patient's FSGs are controlled on current medication regimen.  Last A1c reviewed-   Lab Results   Component Value Date    HGBA1C 6.2 (H) 05/27/2024     Most recent fingerstick glucose reviewed-   Recent Labs   Lab 06/30/24  1530 06/30/24 2033   POCTGLUCOSE 117* 111*       Current correctional scale  Medium  Maintain anti-hyperglycemic dose as follows-   Antihyperglycemics (From admission, onward)      Start     Stop Route Frequency Ordered    06/09/24 2100  insulin glargine U-100 (Lantus) pen 13 Units         -- SubQ Nightly 06/09/24 0756    06/09/24 1255  insulin aspart U-100 pen 0-10 Units         -- SubQ Before meals & nightly PRN 06/09/24 1155          Hold Oral hypoglycemics while patient is in the hospital.  POCT glucose checks   Continue tube feeds

## 2024-07-01 NOTE — PT/OT/SLP PROGRESS
Physical Therapy      Patient Name:  Sarah Saravia   MRN:  [id number]    Patient not seen today secondary to Dialysis. Will follow-up at next appropriate date.

## 2024-07-01 NOTE — PROGRESS NOTES
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 History was given from patient's daughter. She was undergoing dialysis today and noticed she was lethargic, less alert than usual self. Pt completed dialysis and still not acting herself. EMS was called, fever of a 100.  On chart review, she did have an episode of large volume emesis around 1700.  Per EMS, she had a slightly elevated temp 100.0°F, glucose 300s.     In the ED: UA 2+ leuks, >100 WBC, many bacteria, WBC 17, CT abd/pelvis concerning for cystitis. Given vanc/zosyn    Overview/Hospital Course:  53 JARROD admitted to Hospital Medicine service for urosepsis. Vanc/zosyn initiated, found to have staph epi in all 4 bottles, vanc/ertapenem course completed per ID. Vascular Neurology consulted concerning mental status change with the recommendation to initiate ASA 81 QD and to obtain an MRI to r/o new stroke. Per discussion with vascular neurology, MRI findings appear to be expected changes from prior stroke. Nephrology was consulted for regularly scheduled dialysis. Pulm consulted, patient decannulated 4/30. Failed swallow assessment, continuing tube feeds. Ongoing placement difficulties d/t need for accepting HD facility to have sandee lift with 2 personnel to operate. Once patient gets accepted at [facility], next step will be to work with daughter on half-way NH placement. SW onboard working on paperwork for long term Medicaid application. H/c c/b new fever, ID reconsulted, CT chest showing bilateral ground glass  opacities, Vanc/meropenem course to be completed 6/17, however patient had further episode of fever and will need continued merem and ID consulted for assistance. CT pan scan to assess for infection. Imaging without signs of infection, patient to complete empiric abx for 5 days. Patient continues fevering despite broad abx, no identified source. Chest x-ray, peripheral smear to assess for other sources. Tunneled catheter removed and replaced via interventional nephrology at recommendation of ID. Catheter tip culture + for proteus. Patient started on ertapenem, to complete equivalent of 7 days total of therapy, which can be continued at the nursing home.    Interval History: ESDRAS MULLER. Medically stable at discharge. Infectious disease consulted and recommended ertapenem 1 g 3x week after dialysis after discharge for equivalent of 7 days total of therapy. Fluconazole renally adjusted.    Review of Systems   Unable to perform ROS: Patient nonverbal     Objective:     Vital Signs (Most Recent):  Temp: 97.7 °F (36.5 °C) (07/01/24 0721)  Pulse: 91 (07/01/24 1000)  Resp: 16 (07/01/24 0935)  BP: 115/76 (07/01/24 1000)  SpO2: 98 % (07/01/24 0721) Vital Signs (24h Range):  Temp:  [97.3 °F (36.3 °C)-98.7 °F (37.1 °C)] 97.7 °F (36.5 °C)  Pulse:  [] 91  Resp:  [16-20] 16  SpO2:  [98 %-100 %] 98 %  BP: ()/(65-86) 115/76     Weight: 105.5 kg (232 lb 9.4 oz)  Body mass index is 36.43 kg/m².    Intake/Output Summary (Last 24 hours) at 7/1/2024 1009  Last data filed at 7/1/2024 0600  Gross per 24 hour   Intake 707.3 ml   Output --   Net 707.3 ml         Physical Exam  Constitutional:       Appearance: Normal appearance.   HENT:      Head: Normocephalic and atraumatic.      Right Ear: External ear normal.      Left Ear: External ear normal.      Nose: Nose normal.      Mouth/Throat:      Mouth: Mucous membranes are moist.   Eyes:      Extraocular Movements: Extraocular movements intact.      Pupils: Pupils are equal,  round, and reactive to light.   Cardiovascular:      Rate and Rhythm: Normal rate and regular rhythm.      Pulses: Normal pulses.      Heart sounds: Normal heart sounds.   Abdominal:      General: Abdomen is flat.   Musculoskeletal:         General: Normal range of motion.   Skin:     General: Skin is warm.      Capillary Refill: Capillary refill takes less than 2 seconds.   Neurological:      General: No focal deficit present.      Mental Status: She is alert and oriented to person, place, and time.             Significant Labs: All pertinent labs within the past 24 hours have been reviewed.    Significant Imaging: I have reviewed all pertinent imaging results/findings within the past 24 hours.    Assessment/Plan:      * Sepsis                                                           This patient does have evidence of infective focus. My overall impression is sepsis. Source: Skin and Soft Tissue (location Abdominal). Not requiring pressors. Source control achieved by: vanc/meropenem.  Concerns septic source may be PEG site with associated purulent wound despite Wound Care attempts to protect with barriers. PEG placed by General Surgery 2/2024. General Surgery consulted regarding PEG site ordered CT No evidence of infection  US extremities negative for thrombus   CT pan scan w/ IV contrast without evidence of infection  Interventional nephrology consulted for line eval - low suspicion for clot, getting blood cx to r/o infected line  Peripheral smear with findings concerning for infection    Tunneled catheter removed and replaced  Catheter line + for proteus    - Id consulted, Started IV ertapenem, now on day 3 of 7.  500 mg IV q day while inpatient.  Switch to 1 gram, 3 times a week after dialysis after discharge.  Recommend the equivalent of 7 days total of therapy.    Cervical stenosis of spinal canal  Outpatient MRI and NSGY f/up    Irritant contact dermatitis due friction or contact with other specified body  fluids  Wound care following     Debility  Needs:  Wheelchair: patient has a mobility limitation that significantly impairs her ability to participate in one or more mobility related activities of daily living (MRADL's) such as toileting, feeding, dressing, grooming, and bathing in customary locations in the home.  The mobility limitation cannot be sufficiently resolved by the use of a cane or walker.   The use of a manual wheelchair will significantly improve the patient's ability to participate in MRADLS and the patient will use it on regular basis in the home.  Family/pt has expressed her willingness to use a manual wheelchair in the home.  She also has a caregiver who is capable of assisting in mobility.    Mrs Saravia requires a hospital bed due to her requiring positioning of the body in ways not feasible with an ordinary bed to alleviate pain/ is completely immobile /or limited mobility and cannot independently make changes in body position without the use of the bed.  The positioning of the body cannot be sufficiently resolved by the use of pillows and wedges    Patient has a mobility limitation that significantly impairs their ability to participate in one or more mobility related activities of daily living, including toileting. This deficit can be resolved by using a bedside commode. Patient demonstrates mobility limitations that will cause them to be confined to one room at home without bathroom access for up to 30 days. Using a bedside commode will greatly improve the patient's ability to participate in MRADLs     Complication of vascular dialysis catheter  Cath intermittently clogging, not resolving with cath-hedy, going for IR venogram 4/30 with possible exchange.   S/p exchange with IR on 4/30  S/p 2nd exchange for concern of line infection in setting occult infection    Constipation  Stool burden on CT    -continue bowel reg  -monitor for BMs    Moderate malnutrition  Nutrition consulted. Most recent  "weight and BMI monitored-     Measurements:  Wt Readings from Last 1 Encounters:   06/26/24 105.5 kg (232 lb 9.4 oz)   Body mass index is 36.43 kg/m².    Patient has been screened and assessed by RD.    Malnutrition Type:  Context: acute illness or injury  Level: moderate    Malnutrition Characteristic Summary:  Weight Loss (Malnutrition): 10% in 6 months  Subcutaneous Fat (Malnutrition): mild depletion  Muscle Mass (Malnutrition): mild depletion  Fluid Accumulation (Malnutrition): mild    Interventions/Recommendations (treatment strategy):  - TF  - previous history of failed swallow studies    Prolonged QT interval  Limit Qtc prolonging drugs as able    Spastic hemiplegia of right dominant side as late effect of cerebrovascular disease  Important that patient is able to sit in chair for 4h for dialysis placement needs, even if she requires lift/assistance getting into the chair.This patient has Chronic right hemiplegia due to stroke. Physical therapy services has been scheduled. Continue all standard measures for pressure injury prevention and consult wound care for any wounds (chronic or acute).    ESRD (end stage renal disease) on dialysis  Creatine stable for now. BMP reviewed- noted Estimated Creatinine Clearance: 14.6 mL/min (A) (based on SCr of 5.5 mg/dL (H)). according to latest data. Based on current GFR, CKD stage is end stage.  Monitor UOP and serial BMP and adjust therapy as needed. Renally dose meds. Avoid nephrotoxic medications and procedures.    -- HD MWF  -- nephro following  -- Hypophosphatemic on sevelamer  -- SW onboard for placement to [facility] to continue dialysis.    PEG (percutaneous endoscopic gastrostomy) status  On PEG tube.Wound care with PEG dressing changes.   CT abdomen with PEG in correct location    - Tube feeds as toelrated    Leukocytosis  See "Sepsis"    Type 2 diabetes mellitus with hyperglycemia, with long-term current use of insulin  Adjusting insulin to account for " diabetic TF. POCG in good range.    Patient's FSGs are controlled on current medication regimen.  Last A1c reviewed-   Lab Results   Component Value Date    HGBA1C 6.2 (H) 05/27/2024     Most recent fingerstick glucose reviewed-   Recent Labs   Lab 06/30/24  1530 06/30/24  2033   POCTGLUCOSE 117* 111*       Current correctional scale  Medium  Maintain anti-hyperglycemic dose as follows-   Antihyperglycemics (From admission, onward)      Start     Stop Route Frequency Ordered    06/09/24 2100  insulin glargine U-100 (Lantus) pen 13 Units         -- SubQ Nightly 06/09/24 0756    06/09/24 1255  insulin aspart U-100 pen 0-10 Units         -- SubQ Before meals & nightly PRN 06/09/24 1155          Hold Oral hypoglycemics while patient is in the hospital.  POCT glucose checks   Continue tube feeds    Hyponatremia  We will monitor sodium Daily. The hyponatremia is due to ESRD. Discussed with nephrology, dialysate bath adjusted to account for and correct hyponatremia. Hyponatremia stable at 128.  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 Pt underwent extensive course, currently still healing from this, but no longer has wound vac. Picture in media tabs    - Wound care while inpatient  - NH with wound care orders      VTE Risk Mitigation (From admission, onward)           Ordered     heparin (porcine) injection 1,000 Units  As needed (PRN)         06/21/24 0828     heparin (porcine) injection 1,000 Units  As needed (PRN)         06/10/24 0035     heparin (porcine) injection 3,000 Units  As needed (PRN)         04/17/24 0825                    Discharge Planning   ZEKE: 7/5/2024     Code Status: Full Code   Is the patient medically ready for discharge?: No    Reason for patient still in hospital (select all that apply): Treatment  Discharge Plan A: New Nursing Home placement - correction care facility   Discharge Delays: (!)  Payor Issues              Samra Leavitt MD  Department of Hospital Medicine   [facility] - Telemetry Stepdown

## 2024-07-01 NOTE — PT/OT/SLP PROGRESS
Speech Language Pathology      Sarah DOE Manjit  MRN: [id number]    Patient not seen today secondary to dialysis. Will follow-up 7/2.

## 2024-07-01 NOTE — NURSING
Nurses Note -- 4 Eyes      6/30/2024   8:56 PM      Skin assessed during: Q Shift Change      [] No Altered Skin Integrity Present    []Prevention Measures Documented      [x] Yes- Altered Skin Integrity Present or Discovered   [] LDA Added if Not in Epic (Describe Wound)   [] New Altered Skin Integrity was Present on Admit and Documented in LDA   [] Wound Image Taken    Wound Care Consulted? No    Attending Nurse:  Raoul Eastman RN/Staff Member:  Mary

## 2024-07-02 LAB
ALBUMIN SERPL BCP-MCNC: 3.2 G/DL (ref 3.5–5.2)
ALBUMIN SERPL BCP-MCNC: 3.2 G/DL (ref 3.5–5.2)
ALP SERPL-CCNC: 86 U/L (ref 55–135)
ALP SERPL-CCNC: 86 U/L (ref 55–135)
ALT SERPL W/O P-5'-P-CCNC: 14 U/L (ref 10–44)
ALT SERPL W/O P-5'-P-CCNC: 16 U/L (ref 10–44)
ANION GAP SERPL CALC-SCNC: 17 MMOL/L (ref 8–16)
ANION GAP SERPL CALC-SCNC: 17 MMOL/L (ref 8–16)
ANION GAP SERPL CALC-SCNC: 19 MMOL/L (ref 8–16)
AST SERPL-CCNC: 22 U/L (ref 10–40)
AST SERPL-CCNC: 38 U/L (ref 10–40)
BASOPHILS # BLD AUTO: 0.08 K/UL (ref 0–0.2)
BASOPHILS NFR BLD: 0.9 % (ref 0–1.9)
BILIRUB SERPL-MCNC: 0.3 MG/DL (ref 0.1–1)
BILIRUB SERPL-MCNC: 0.3 MG/DL (ref 0.1–1)
BUN SERPL-MCNC: 34 MG/DL (ref 6–20)
BUN SERPL-MCNC: 38 MG/DL (ref 6–20)
BUN SERPL-MCNC: 38 MG/DL (ref 6–20)
CALCIUM SERPL-MCNC: 10.2 MG/DL (ref 8.7–10.5)
CALCIUM SERPL-MCNC: 10.3 MG/DL (ref 8.7–10.5)
CALCIUM SERPL-MCNC: 10.3 MG/DL (ref 8.7–10.5)
CHLORIDE SERPL-SCNC: 91 MMOL/L (ref 95–110)
CHLORIDE SERPL-SCNC: 91 MMOL/L (ref 95–110)
CHLORIDE SERPL-SCNC: 93 MMOL/L (ref 95–110)
CO2 SERPL-SCNC: 14 MMOL/L (ref 23–29)
CO2 SERPL-SCNC: 18 MMOL/L (ref 23–29)
CO2 SERPL-SCNC: 19 MMOL/L (ref 23–29)
CREAT SERPL-MCNC: 8 MG/DL (ref 0.5–1.4)
CREAT SERPL-MCNC: 8.7 MG/DL (ref 0.5–1.4)
CREAT SERPL-MCNC: 8.7 MG/DL (ref 0.5–1.4)
DIFFERENTIAL METHOD BLD: ABNORMAL
EOSINOPHIL # BLD AUTO: 0.6 K/UL (ref 0–0.5)
EOSINOPHIL NFR BLD: 6.7 % (ref 0–8)
ERYTHROCYTE [DISTWIDTH] IN BLOOD BY AUTOMATED COUNT: 16.9 % (ref 11.5–14.5)
EST. GFR  (NO RACE VARIABLE): 5 ML/MIN/1.73 M^2
EST. GFR  (NO RACE VARIABLE): 5 ML/MIN/1.73 M^2
EST. GFR  (NO RACE VARIABLE): 5.5 ML/MIN/1.73 M^2
GLUCOSE SERPL-MCNC: 83 MG/DL (ref 70–110)
GLUCOSE SERPL-MCNC: 96 MG/DL (ref 70–110)
GLUCOSE SERPL-MCNC: 98 MG/DL (ref 70–110)
HCT VFR BLD AUTO: 31.1 % (ref 37–48.5)
HGB BLD-MCNC: 9.8 G/DL (ref 12–16)
IMM GRANULOCYTES # BLD AUTO: 0.42 K/UL (ref 0–0.04)
IMM GRANULOCYTES NFR BLD AUTO: 4.6 % (ref 0–0.5)
LYMPHOCYTES # BLD AUTO: 2.8 K/UL (ref 1–4.8)
LYMPHOCYTES NFR BLD: 30.2 % (ref 18–48)
MAGNESIUM SERPL-MCNC: 2.2 MG/DL (ref 1.6–2.6)
MCH RBC QN AUTO: 24.4 PG (ref 27–31)
MCHC RBC AUTO-ENTMCNC: 31.5 G/DL (ref 32–36)
MCV RBC AUTO: 77 FL (ref 82–98)
MONOCYTES # BLD AUTO: 1.3 K/UL (ref 0.3–1)
MONOCYTES NFR BLD: 14.3 % (ref 4–15)
NEUTROPHILS # BLD AUTO: 4 K/UL (ref 1.8–7.7)
NEUTROPHILS NFR BLD: 43.3 % (ref 38–73)
NRBC BLD-RTO: 0 /100 WBC
OSMOLALITY SERPL: 285 MOSM/KG (ref 275–295)
PHOSPHATE SERPL-MCNC: 5.3 MG/DL (ref 2.7–4.5)
PLATELET # BLD AUTO: 305 K/UL (ref 150–450)
PMV BLD AUTO: 10.4 FL (ref 9.2–12.9)
POCT GLUCOSE: 102 MG/DL (ref 70–110)
POCT GLUCOSE: 116 MG/DL (ref 70–110)
POCT GLUCOSE: 125 MG/DL (ref 70–110)
POCT GLUCOSE: 135 MG/DL (ref 70–110)
POTASSIUM SERPL-SCNC: 4.8 MMOL/L (ref 3.5–5.1)
POTASSIUM SERPL-SCNC: 4.8 MMOL/L (ref 3.5–5.1)
POTASSIUM SERPL-SCNC: 5.4 MMOL/L (ref 3.5–5.1)
PROT SERPL-MCNC: 8.2 G/DL (ref 6–8.4)
PROT SERPL-MCNC: 8.7 G/DL (ref 6–8.4)
RBC # BLD AUTO: 4.02 M/UL (ref 4–5.4)
SODIUM SERPL-SCNC: 126 MMOL/L (ref 136–145)
SODIUM SERPL-SCNC: 126 MMOL/L (ref 136–145)
SODIUM SERPL-SCNC: 127 MMOL/L (ref 136–145)
WBC # BLD AUTO: 9.11 K/UL (ref 3.9–12.7)

## 2024-07-02 PROCEDURE — 83930 ASSAY OF BLOOD OSMOLALITY: CPT

## 2024-07-02 PROCEDURE — 99232 SBSQ HOSP IP/OBS MODERATE 35: CPT | Mod: ,,, | Performed by: NURSE PRACTITIONER

## 2024-07-02 PROCEDURE — 36415 COLL VENOUS BLD VENIPUNCTURE: CPT

## 2024-07-02 PROCEDURE — 25000003 PHARM REV CODE 250

## 2024-07-02 PROCEDURE — 85025 COMPLETE CBC W/AUTO DIFF WBC: CPT

## 2024-07-02 PROCEDURE — 80048 BASIC METABOLIC PNL TOTAL CA: CPT | Mod: XB

## 2024-07-02 PROCEDURE — 36593 DECLOT VASCULAR DEVICE: CPT

## 2024-07-02 PROCEDURE — 92526 ORAL FUNCTION THERAPY: CPT

## 2024-07-02 PROCEDURE — 83735 ASSAY OF MAGNESIUM: CPT

## 2024-07-02 PROCEDURE — 80100014 HC HEMODIALYSIS 1:1

## 2024-07-02 PROCEDURE — 80053 COMPREHEN METABOLIC PANEL: CPT

## 2024-07-02 PROCEDURE — 27000207 HC ISOLATION

## 2024-07-02 PROCEDURE — 84100 ASSAY OF PHOSPHORUS: CPT

## 2024-07-02 PROCEDURE — 25000003 PHARM REV CODE 250: Performed by: STUDENT IN AN ORGANIZED HEALTH CARE EDUCATION/TRAINING PROGRAM

## 2024-07-02 PROCEDURE — 63600175 PHARM REV CODE 636 W HCPCS: Mod: JZ,JG

## 2024-07-02 PROCEDURE — 20600001 HC STEP DOWN PRIVATE ROOM

## 2024-07-02 PROCEDURE — 63600175 PHARM REV CODE 636 W HCPCS: Performed by: NURSE PRACTITIONER

## 2024-07-02 PROCEDURE — 63600175 PHARM REV CODE 636 W HCPCS

## 2024-07-02 PROCEDURE — 80053 COMPREHEN METABOLIC PANEL: CPT | Mod: 91 | Performed by: NURSE PRACTITIONER

## 2024-07-02 PROCEDURE — 97530 THERAPEUTIC ACTIVITIES: CPT | Mod: CQ

## 2024-07-02 RX ORDER — SODIUM CHLORIDE 9 MG/ML
INJECTION, SOLUTION INTRAVENOUS ONCE
Status: CANCELLED | OUTPATIENT
Start: 2024-07-03

## 2024-07-02 RX ORDER — FLUCONAZOLE 40 MG/ML
100 POWDER, FOR SUSPENSION ORAL DAILY
Status: COMPLETED | OUTPATIENT
Start: 2024-07-02 | End: 2024-07-06

## 2024-07-02 RX ADMIN — Medication 500 MG: at 09:07

## 2024-07-02 RX ADMIN — PANTOPRAZOLE SODIUM 40 MG: 40 GRANULE, DELAYED RELEASE ORAL at 08:07

## 2024-07-02 RX ADMIN — POLYETHYLENE GLYCOL 3350 17 G: 17 POWDER, FOR SOLUTION ORAL at 09:07

## 2024-07-02 RX ADMIN — Medication 500 MG: at 08:07

## 2024-07-02 RX ADMIN — POLYETHYLENE GLYCOL 3350 17 G: 17 POWDER, FOR SOLUTION ORAL at 08:07

## 2024-07-02 RX ADMIN — HEPARIN SODIUM 1000 UNITS: 1000 INJECTION, SOLUTION INTRAVENOUS; SUBCUTANEOUS at 08:07

## 2024-07-02 RX ADMIN — HEPARIN SODIUM 1000 UNITS: 1000 INJECTION, SOLUTION INTRAVENOUS; SUBCUTANEOUS at 02:07

## 2024-07-02 RX ADMIN — HEPARIN SODIUM 3000 UNITS: 1000 INJECTION, SOLUTION INTRAVENOUS; SUBCUTANEOUS at 09:07

## 2024-07-02 RX ADMIN — ALTEPLASE 2 MG: 2.2 INJECTION, POWDER, LYOPHILIZED, FOR SOLUTION INTRAVENOUS at 11:07

## 2024-07-02 RX ADMIN — INSULIN GLARGINE 13 UNITS: 100 INJECTION, SOLUTION SUBCUTANEOUS at 09:07

## 2024-07-02 RX ADMIN — METOPROLOL TARTRATE 25 MG: 25 TABLET, FILM COATED ORAL at 08:07

## 2024-07-02 RX ADMIN — FLUCONAZOLE 100 MG: 40 POWDER, FOR SUSPENSION ORAL at 09:07

## 2024-07-02 RX ADMIN — ERTAPENEM 500 MG: 1 INJECTION INTRAMUSCULAR; INTRAVENOUS at 09:07

## 2024-07-02 RX ADMIN — ASPIRIN 81 MG CHEWABLE TABLET 81 MG: 81 TABLET CHEWABLE at 08:07

## 2024-07-02 RX ADMIN — ATORVASTATIN CALCIUM 40 MG: 40 TABLET, FILM COATED ORAL at 08:07

## 2024-07-02 NOTE — SUBJECTIVE & OBJECTIVE
Interval History: NAEON. HD yesterday with UF of 1.4 L. Electrolytes on labs this morning with worsening hyponatremia, hyperkalemia despite dialysis yesterday but with hemolysis present. Stat BMP ordered by primary team, results pending.     Review of patient's allergies indicates:  No Known Allergies  Current Facility-Administered Medications   Medication Frequency    acetaminophen oral solution 650 mg Q6H PRN    ascorbic acid (vitamin C) tablet 500 mg BID    aspirin chewable tablet 81 mg Daily    atorvastatin tablet 40 mg Daily    dextrose 10% bolus 125 mL 125 mL PRN    dextrose 10% bolus 250 mL 250 mL PRN    epoetin merry injection 6,100 Units Every Mon, Wed, Fri    ertapenem (INVANZ) 500 mg in 0.9% NaCl 100 mL IVPB Q24H    fluconazole 40 mg/ml suspension 100 mg Daily    glucagon (human recombinant) injection 1 mg PRN    glucose chewable tablet 16 g PRN    glucose chewable tablet 24 g PRN    heparin (porcine) injection 1,000 Units PRN    heparin (porcine) injection 3,000 Units PRN    hepatitis B virus vacc.rec(PF) injection 20 mcg vaccine x 1 dose    insulin aspart U-100 pen 0-10 Units QID (AC + HS) PRN    insulin glargine U-100 (Lantus) pen 13 Units QHS    metoprolol tartrate (LOPRESSOR) tablet 25 mg BID    ondansetron 4 mg/5 mL solution 4 mg Q6H PRN    oxyCODONE 5 mg/5 mL solution 5 mg Q4H PRN    pantoprazole suspension 40 mg Daily    polyethylene glycol packet 17 g TID    sodium chloride 0.9% bolus 250 mL 250 mL PRN    sodium chloride 0.9% flush 10 mL Q12H PRN    sodium chloride 0.9% flush 10 mL PRN       Objective:     Vital Signs (Most Recent):  Temp: 98.1 °F (36.7 °C) (07/02/24 0752)  Pulse: 95 (07/02/24 0752)  Resp: 18 (07/02/24 0752)  BP: 127/79 (07/02/24 0820)  SpO2: 99 % (07/02/24 0752) Vital Signs (24h Range):  Temp:  [97.9 °F (36.6 °C)-98.5 °F (36.9 °C)] 98.1 °F (36.7 °C)  Pulse:  [] 95  Resp:  [16-20] 18  SpO2:  [99 %-100 %] 99 %  BP: ()/(56-85) 127/79     Weight: 105.5 kg (232 lb 9.4 oz)  (06/26/24 1000)  Body mass index is 36.43 kg/m².  Body surface area is 2.23 meters squared.    I/O last 3 completed shifts:  In: 1407.3 [Other:350; NG/GT:900; IV Piggyback:157.3]  Out: 2024 [Drains:5; Other:2019]     Physical Exam  Vitals reviewed.   Constitutional:       Comments: Awake, no verbal response but nods in response to questions.    Eyes:      Conjunctiva/sclera: Conjunctivae normal.   Cardiovascular:      Rate and Rhythm: Normal rate.   Pulmonary:      Effort: Pulmonary effort is normal. No respiratory distress.   Abdominal:      General: There is no distension.      Palpations: Abdomen is soft.   Musculoskeletal:      Comments: Mild bilateral lower extremity edema.    Skin:     General: Skin is warm and dry.   Neurological:      Mental Status: Mental status is at baseline.          Significant Labs:  CBC:   Recent Labs   Lab 07/02/24  0348   WBC 9.11   RBC 4.02   HGB 9.8*   HCT 31.1*      MCV 77*   MCH 24.4*   MCHC 31.5*     CMP:   Recent Labs   Lab 07/02/24  0348   GLU 83   CALCIUM 10.3   ALBUMIN 3.2*   PROT 8.7*   *   K 5.4*   CO2 14*   CL 93*   BUN 34*   CREATININE 8.0*   ALKPHOS 86   ALT 16   AST 38   BILITOT 0.3     All labs within the past 24 hours have been reviewed.

## 2024-07-02 NOTE — ASSESSMENT & PLAN NOTE
Adjusting insulin to account for diabetic TF. POCG in good range.    Patient's FSGs are controlled on current medication regimen.  Last A1c reviewed-   Lab Results   Component Value Date    HGBA1C 6.2 (H) 05/27/2024     Most recent fingerstick glucose reviewed-   Recent Labs   Lab 07/01/24  1656 07/01/24  2043 07/02/24  0839   POCTGLUCOSE 118* 113* 135*       Current correctional scale  Medium  Maintain anti-hyperglycemic dose as follows-   Antihyperglycemics (From admission, onward)    Start     Stop Route Frequency Ordered    06/09/24 2100  insulin glargine U-100 (Lantus) pen 13 Units         -- SubQ Nightly 06/09/24 0756    06/09/24 1255  insulin aspart U-100 pen 0-10 Units         -- SubQ Before meals & nightly PRN 06/09/24 1155        Hold Oral hypoglycemics while patient is in the hospital.  POCT glucose checks   Continue tube feeds

## 2024-07-02 NOTE — PLAN OF CARE
CM notified that patient's IV Ertopenem is expected to be finished Friday. IM4 team stated that patient should be ready for discharge Saturday. Call placed to [facility] and they informed this CM that they can not accept new patients over the weekend. MD made aware of above. Will continue to follow.    Sara Loredo RN  Ext [phone]

## 2024-07-02 NOTE — SUBJECTIVE & OBJECTIVE
Interval History: VSS. K of 5.4 this morning, but sample reported possible hemolysis. Repeating BMP. Na of 126, sodium has been stably low. Ordered serum osm, urine osm, and urine sodium. Decreased FWF from TID to BID.     Review of Systems   Unable to perform ROS: Patient nonverbal     Objective:     Vital Signs (Most Recent):  Temp: 97.5 °F (36.4 °C) (07/02/24 1133)  Pulse: 88 (07/02/24 1133)  Resp: 18 (07/02/24 1133)  BP: 109/72 (07/02/24 1133)  SpO2: 99 % (07/02/24 1133) Vital Signs (24h Range):  Temp:  [97.5 °F (36.4 °C)-98.5 °F (36.9 °C)] 97.5 °F (36.4 °C)  Pulse:  [] 88  Resp:  [18-20] 18  SpO2:  [99 %-100 %] 99 %  BP: (109-127)/(72-85) 109/72     Weight: 105.5 kg (232 lb 9.4 oz)  Body mass index is 36.43 kg/m².    Intake/Output Summary (Last 24 hours) at 7/2/2024 1326  Last data filed at 7/2/2024 0700  Gross per 24 hour   Intake 500 ml   Output 5 ml   Net 495 ml         Physical Exam  Constitutional:       Appearance: Normal appearance.   HENT:      Head: Normocephalic and atraumatic.      Right Ear: External ear normal.      Left Ear: External ear normal.      Nose: Nose normal.      Mouth/Throat:      Mouth: Mucous membranes are moist.   Eyes:      Extraocular Movements: Extraocular movements intact.      Pupils: Pupils are equal, round, and reactive to light.   Cardiovascular:      Rate and Rhythm: Normal rate and regular rhythm.      Pulses: Normal pulses.      Heart sounds: Normal heart sounds.   Abdominal:      General: Abdomen is flat.   Musculoskeletal:         General: Normal range of motion.      Right lower leg: Edema (1+) present.      Left lower leg: Edema (1+) present.   Skin:     General: Skin is warm.      Capillary Refill: Capillary refill takes less than 2 seconds.   Neurological:      General: No focal deficit present.      Mental Status: She is alert and oriented to person, place, and time.             Significant Labs: All pertinent labs within the past 24 hours have been  reviewed.    Significant Imaging: I have reviewed all pertinent imaging results/findings within the past 24 hours.

## 2024-07-02 NOTE — ASSESSMENT & PLAN NOTE
54 y.o. Black or  Female ESRD-HD M-W-F at [facility] presents to ED on 4/10/2024 with UTI.    Of note, the patient was initiated on RRT in February 2024 after being admitted with ALVARADO 2/2 iATN due to septic shock. Perm cath placed on 2/29/24. She was transferred to [facility] on 3/12. Deemed ESRD at [facility].  Nephrology consulted for inpatient ESRD-HD management    Assessment:   - HD completed yesterday. Plans for HD tomorrow.   - Labs today show worsening hyponatremia, hyperkalemia. Hemolysis present. Repeat BMP pending, will determine need for more urgent HD after repeat BMP results.   - Tentative plan for discharge Friday after completion of abx  - Hyponatremia in setting of ESRD - Recommend decreasing FWF. Currently receiving 200 ml TID. Would decrease to 200 BID. Will attempt to increase UF during HD tomorrow, patient with mild hypotension during recent HD sessions and has only tolerated 1-1.5 L UF. Will consider midodrine to aid in blood pressure with HD if needed to facilitate   - Recommend changing tube feeds to Novasource/Renal diet  - Continue to monitor intake and output  - Please avoid gadolinium, fleets, phos-based laxatives, NSAIDs  - Dialysis thrice weekly unless more urgent indications arise. Will evaluate RRT requirements Daily.      Lab Results   Component Value Date    FESATURATED 10 (L) 03/15/2024    FERRITIN 414 (H) 03/15/2024       - Goal in ESRD is Hgb of 10-11. Continue EPO.       Secondary hyperparathyroid of renal origin   - no indication for phos binders

## 2024-07-02 NOTE — PLAN OF CARE
Problem: Adult Inpatient Plan of Care  Goal: Plan of Care Review  7/2/2024 1152 by Yohana Walton RN  Outcome: Progressing  7/2/2024 1152 by Yohana Walton RN  Outcome: Not Progressing     Problem: Skin Injury Risk Increased  Goal: Skin Health and Integrity  7/2/2024 1152 by Yohana Walton, RN  Outcome: Not Progressing     Problem: Skin Injury Risk Increased  Goal: Skin Health and Integrity  7/2/2024 1152 by Yohana Walton, RN  Outcome: Progressing  7/2/2024 1152 by Yohana Walton, RN  Outcome: Not Progressing

## 2024-07-02 NOTE — NURSING
.Nurses Note -- 4 Eyes      7/1/2024   10:08 PM      Skin assessed during: Q Shift Change      [] No Altered Skin Integrity Present    []Prevention Measures Documented      [x] Yes- Altered Skin Integrity Present or Discovered   [] LDA Added if Not in Epic (Describe Wound)   [] New Altered Skin Integrity was Present on Admit and Documented in LDA   [] Wound Image Taken    Wound Care Consulted? Yes    Attending Nurse:  Pk Eastman RN/Staff Member:  Faustino

## 2024-07-02 NOTE — ASSESSMENT & PLAN NOTE
Creatine stable for now. BMP reviewed- noted Estimated Creatinine Clearance: 10.1 mL/min (A) (based on SCr of 8 mg/dL (H)). according to latest data. Based on current GFR, CKD stage is end stage.  Monitor UOP and serial BMP and adjust therapy as needed. Renally dose meds. Avoid nephrotoxic medications and procedures.    -- HD MWF  -- nephro following  -- Hypophosphatemic on sevelamer  -- SW onboard for placement to [facility] to continue dialysis.

## 2024-07-02 NOTE — PLAN OF CARE
Problem: Infection  Goal: Absence of Infection Signs and Symptoms  Outcome: Progressing     Problem: Skin Injury Risk Increased  Goal: Skin Health and Integrity  Outcome: Progressing     Problem: Adult Inpatient Plan of Care  Goal: Plan of Care Review  Outcome: Progressing     Problem: Chronic Kidney Disease  Goal: Optimal Coping with Chronic Illness  Outcome: Progressing  Goal: Electrolyte Balance  Outcome: Progressing  Goal: Fluid Balance  Outcome: Progressing  Goal: Optimal Functional Ability  Outcome: Progressing  Goal: Absence of Anemia Signs and Symptoms  Outcome: Progressing  Goal: Optimal Oral Intake  Outcome: Progressing  Goal: Acceptable Pain Control  Outcome: Progressing  Goal: Minimize Renal Failure Effects  Outcome: Progressing     Problem: Hemodialysis  Goal: Safe, Effective Therapy Delivery  Outcome: Progressing  Goal: Effective Tissue Perfusion  Outcome: Progressing  Goal: Absence of Infection Signs and Symptoms  Outcome: Progressing    Pt remained free of falls or injuries during shift. No signs of acute distress noted during shift.

## 2024-07-02 NOTE — PROGRESS NOTES
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 History was given from patient's daughter. She was undergoing dialysis today and noticed she was lethargic, less alert than usual self. Pt completed dialysis and still not acting herself. EMS was called, fever of a 100.  On chart review, she did have an episode of large volume emesis around 1700.  Per EMS, she had a slightly elevated temp 100.0°F, glucose 300s.     In the ED: UA 2+ leuks, >100 WBC, many bacteria, WBC 17, CT abd/pelvis concerning for cystitis. Given vanc/zosyn    Overview/Hospital Course:  53 JARROD admitted to Hospital Medicine service for urosepsis. Vanc/zosyn initiated, found to have staph epi in all 4 bottles, vanc/ertapenem course completed per ID. Vascular Neurology consulted concerning mental status change with the recommendation to initiate ASA 81 QD and to obtain an MRI to r/o new stroke. Per discussion with vascular neurology, MRI findings appear to be expected changes from prior stroke. Nephrology was consulted for regularly scheduled dialysis. Pulm consulted, patient decannulated 4/30. Failed swallow assessment, continuing tube feeds. Ongoing placement difficulties d/t need for accepting HD facility to have sandee lift with 2 personnel to operate. Once patient gets accepted at [facility], next step will be to work with daughter on half-way NH placement. SW onboard working on paperwork for long term Medicaid application. H/c c/b new fever, ID reconsulted, CT chest showing bilateral ground glass  opacities, Vanc/meropenem course to be completed 6/17, however patient had further episode of fever and will need continued merem and ID consulted for assistance. CT pan scan to assess for infection. Imaging without signs of infection, patient to complete empiric abx for 5 days. Patient continues fevering despite broad abx, no identified source. Chest x-ray, peripheral smear to assess for other sources. Tunneled catheter removed and replaced via interventional nephrology at recommendation of ID. Catheter tip culture + for proteus. Patient started on ertapenem to complete a 7 day course prior to discharge.     Interval History: VSS. K of 5.4 this morning, but sample reported possible hemolysis. Repeating BMP. Na of 126, sodium has been stably low. Ordered serum osm, urine osm, and urine sodium. Decreased FWF from TID to BID.     Review of Systems   Unable to perform ROS: Patient nonverbal     Objective:     Vital Signs (Most Recent):  Temp: 97.5 °F (36.4 °C) (07/02/24 1133)  Pulse: 88 (07/02/24 1133)  Resp: 18 (07/02/24 1133)  BP: 109/72 (07/02/24 1133)  SpO2: 99 % (07/02/24 1133) Vital Signs (24h Range):  Temp:  [97.5 °F (36.4 °C)-98.5 °F (36.9 °C)] 97.5 °F (36.4 °C)  Pulse:  [] 88  Resp:  [18-20] 18  SpO2:  [99 %-100 %] 99 %  BP: (109-127)/(72-85) 109/72     Weight: 105.5 kg (232 lb 9.4 oz)  Body mass index is 36.43 kg/m².    Intake/Output Summary (Last 24 hours) at 7/2/2024 1326  Last data filed at 7/2/2024 0700  Gross per 24 hour   Intake 500 ml   Output 5 ml   Net 495 ml         Physical Exam  Constitutional:       Appearance: Normal appearance.   HENT:      Head: Normocephalic and atraumatic.      Right Ear: External ear normal.      Left Ear: External ear normal.      Nose: Nose normal.      Mouth/Throat:      Mouth: Mucous membranes are moist.   Eyes:      Extraocular Movements: Extraocular movements intact.      Pupils: Pupils are equal, round, and reactive to light.   Cardiovascular:      Rate and  Rhythm: Normal rate and regular rhythm.      Pulses: Normal pulses.      Heart sounds: Normal heart sounds.   Abdominal:      General: Abdomen is flat.   Musculoskeletal:         General: Normal range of motion.      Right lower leg: Edema (1+) present.      Left lower leg: Edema (1+) present.   Skin:     General: Skin is warm.      Capillary Refill: Capillary refill takes less than 2 seconds.   Neurological:      General: No focal deficit present.      Mental Status: She is alert and oriented to person, place, and time.             Significant Labs: All pertinent labs within the past 24 hours have been reviewed.    Significant Imaging: I have reviewed all pertinent imaging results/findings within the past 24 hours.    Assessment/Plan:      * Sepsis                                                           This patient does have evidence of infective focus. My overall impression is sepsis. Source: Skin and Soft Tissue (location Abdominal). Not requiring pressors. Source control achieved by: vanc/meropenem.  Concerns septic source may be PEG site with associated purulent wound despite Wound Care attempts to protect with barriers. PEG placed by General Surgery 2/2024. General Surgery consulted regarding PEG site ordered CT No evidence of infection  US extremities negative for thrombus   CT pan scan w/ IV contrast without evidence of infection  Interventional nephrology consulted for line eval - low suspicion for clot, getting blood cx to r/o infected line  Peripheral smear with findings concerning for infection    Tunneled catheter removed and replaced  Catheter line + for proteus    - Id consulted, Started IV ertapenem 500 mg daily, now on day 4 of 7 day course. Plan to finish course prior to discharge.    Cervical stenosis of spinal canal  Outpatient MRI and NSGY f/up    Irritant contact dermatitis due friction or contact with other specified body fluids  Wound care following     Debility  Needs:  Wheelchair:  patient has a mobility limitation that significantly impairs her ability to participate in one or more mobility related activities of daily living (MRADL's) such as toileting, feeding, dressing, grooming, and bathing in customary locations in the home.  The mobility limitation cannot be sufficiently resolved by the use of a cane or walker.   The use of a manual wheelchair will significantly improve the patient's ability to participate in MRADLS and the patient will use it on regular basis in the home.  Family/pt has expressed her willingness to use a manual wheelchair in the home.  She also has a caregiver who is capable of assisting in mobility.    Mrs Saravia requires a hospital bed due to her requiring positioning of the body in ways not feasible with an ordinary bed to alleviate pain/ is completely immobile /or limited mobility and cannot independently make changes in body position without the use of the bed.  The positioning of the body cannot be sufficiently resolved by the use of pillows and wedges    Patient has a mobility limitation that significantly impairs their ability to participate in one or more mobility related activities of daily living, including toileting. This deficit can be resolved by using a bedside commode. Patient demonstrates mobility limitations that will cause them to be confined to one room at home without bathroom access for up to 30 days. Using a bedside commode will greatly improve the patient's ability to participate in MRADLs     Complication of vascular dialysis catheter  Cath intermittently clogging, not resolving with cath-hedy, going for IR venogram 4/30 with possible exchange.   S/p exchange with IR on 4/30  S/p 2nd exchange for concern of line infection in setting occult infection    Constipation  Stool burden on CT    -continue bowel reg  -monitor for BMs    Moderate malnutrition  Nutrition consulted. Most recent weight and BMI monitored-     Measurements:  Wt Readings from  "Last 1 Encounters:   06/26/24 105.5 kg (232 lb 9.4 oz)   Body mass index is 36.43 kg/m².    Patient has been screened and assessed by RD.    Malnutrition Type:  Context: acute illness or injury  Level: moderate    Malnutrition Characteristic Summary:  Weight Loss (Malnutrition): 10% in 6 months  Subcutaneous Fat (Malnutrition): mild depletion  Muscle Mass (Malnutrition): mild depletion  Fluid Accumulation (Malnutrition): mild    Interventions/Recommendations (treatment strategy):  - TF  - previous history of failed swallow studies    Prolonged QT interval  Limit Qtc prolonging drugs as able    Spastic hemiplegia of right dominant side as late effect of cerebrovascular disease  Important that patient is able to sit in chair for 4h for dialysis placement needs, even if she requires lift/assistance getting into the chair.This patient has Chronic right hemiplegia due to stroke. Physical therapy services has been scheduled. Continue all standard measures for pressure injury prevention and consult wound care for any wounds (chronic or acute).    ESRD (end stage renal disease) on dialysis  Creatine stable for now. BMP reviewed- noted Estimated Creatinine Clearance: 10.1 mL/min (A) (based on SCr of 8 mg/dL (H)). according to latest data. Based on current GFR, CKD stage is end stage.  Monitor UOP and serial BMP and adjust therapy as needed. Renally dose meds. Avoid nephrotoxic medications and procedures.    -- HD MWF  -- nephro following  -- Hypophosphatemic on sevelamer  -- SW onboard for placement to [facility] to continue dialysis.    PEG (percutaneous endoscopic gastrostomy) status  On PEG tube.Wound care with PEG dressing changes.   CT abdomen with PEG in correct location    - Tube feeds as toelrated    Leukocytosis  See "Sepsis"    Type 2 diabetes mellitus with hyperglycemia, with long-term current use of insulin  Adjusting insulin to account for diabetic TF. POCG in good range.    Patient's FSGs are controlled on " current medication regimen.  Last A1c reviewed-   Lab Results   Component Value Date    HGBA1C 6.2 (H) 05/27/2024     Most recent fingerstick glucose reviewed-   Recent Labs   Lab 07/01/24  1656 07/01/24  2043 07/02/24  0839   POCTGLUCOSE 118* 113* 135*       Current correctional scale  Medium  Maintain anti-hyperglycemic dose as follows-   Antihyperglycemics (From admission, onward)      Start     Stop Route Frequency Ordered    06/09/24 2100  insulin glargine U-100 (Lantus) pen 13 Units         -- SubQ Nightly 06/09/24 0756    06/09/24 1255  insulin aspart U-100 pen 0-10 Units         -- SubQ Before meals & nightly PRN 06/09/24 1155          Hold Oral hypoglycemics while patient is in the hospital.  POCT glucose checks   Continue tube feeds    Hyponatremia  We will monitor sodium Daily. The hyponatremia is due to ESRD. Discussed with nephrology, dialysate bath adjusted to account for and correct hyponatremia.   Hyponatremia has been stably low. Now decreased to 126. Ordered urine osm, serum osm, and urine sodium. Also per dialysis NP recs, changed FWF from TID to BID.    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 Pt underwent extensive course, currently still healing from this, but no longer has wound vac. Picture in media tabs    - Wound care while inpatient  - NH with wound care orders      VTE Risk Mitigation (From admission, onward)           Ordered     heparin (porcine) injection 1,000 Units  As needed (PRN)         06/21/24 0828     heparin (porcine) injection 1,000 Units  As needed (PRN)         06/10/24 0035     heparin (porcine) injection 3,000 Units  As needed (PRN)         04/17/24 0825                    Discharge Planning   ZEKE: 7/8/2024     Code Status: Full Code   Is the patient medically ready for discharge?: No    Reason for patient still in hospital (select all that apply): Treatment  Discharge  Plan A: New Nursing Home placement - [facility] care facility   Discharge Delays: (!) Payor Issues              Samra Leavitt MD  Department of Hospital Medicine   [facility] - Telemetry Stepdown

## 2024-07-02 NOTE — PT/OT/SLP PROGRESS
Speech Language Pathology Treatment    Patient Name:  Sarah Saravia   MRN:  [id number]  Admitting Diagnosis: Sepsis    Recommendations:                 General Recommendations:  Dysphagia therapy and Speech/language therapy  Diet recommendations:  NPO, Liquid Diet Level: NPO Pt may have thin liquids for pleasure  Aspiration Precautions: Continue alternate means of nutrition, HOB to 90 degrees, Monitor for s/s of aspiration, and Strict aspiration precautions   General Precautions: Standard, aphasia, aspiration, fall, NPO  Communication strategies:  yes/no questions only and go to room if call light pushed    Assessment:     Sarah Saravia is a 54 y.o. female with an SLP diagnosis of Aphasia, Dysphagia, and Cognitive-Linguistic Impairment.      Subjective      Pt remained nonverbal/nonvocal t/o session.    Pain/Comfort:   No indications of pain    Respiratory Status: Room air    Objective:     Has the patient been evaluated by SLP for swallowing?   Yes  Keep patient NPO? Yes   Current Respiratory Status:        Pt seen for brief swallowing therapy session while sitting at EOB with PT.  Pt accepted straw sips of water, but exhibited oral holding likely due to difficulty dividing attention to sitting up at EOB while also attending to PO intake.  No overt s/s of aspiration, but PO trials were limited due to oral holding.  No verbal/vocal output elicited despite efforts from SLP and OT.  Pt communicated at times via head nod/shake or facial expressions. Cont recs and POC.     Goals:   Multidisciplinary Problems       SLP Goals          Problem: SLP    Goal Priority Disciplines Outcome   SLP Goal     SLP Progressing   Description: Speech Pathology Goals  To be met by 7/19/24    1. Pt will participate in ongoing diagnostic dysphagia therapy    2. Pt will answer basic Y/N questions with 50% accuracy given MAX multi-modality cueing  3. Pt will follow single step commands paired with model across 50% of trials   4. Pt will  complete automatic speech tasks with 50% accuracy given MAX multi-modality cueing         Speech Language Pathology Goals  Updated goals expected to be met by 5/10 (goals remain appropriate 6/11 - reassess on 6/18:  1. Pt will participate in ongoing swallowing assessment to determine if appropriate for PO trials for pleasure.   2. Pt will model single step command x 1 given max cues.   3. Pt will answer simple yes/no q's during a structured receptive language task x 1 given max cues.   4. Pt will phonate purposefully upon command x 1 given max cues.   5. Pt will attempt to vocalize/verbalize during automatic speech task x 1 given max cues.   6. Pt will participate in evaluation of ability to utilize basic communication board.     Goals expected to be met by 5/8:  1. Pt will participate in Modified Barium Swallow Study to determine if safe for oral intake. Goal met/attempted 5/2                               Plan:     Patient to be seen:  3 x/week   Plan of Care expires:  05/31/24  Plan of Care reviewed with:  patient   SLP Follow-Up:  Yes       Discharge recommendations:  Moderate Intensity Therapy     Time Tracking:     SLP Treatment Date:   07/02/24  Speech Start Time:  1502  Speech Stop Time:  1508     Speech Total Time (min):  6 min    Billable Minutes: Treatment Swallowing Dysfunction 6    07/02/2024

## 2024-07-02 NOTE — PROGRESS NOTES
[facility] - Telemetry Stepdown  Nephrology  Progress Note    Patient Name: Sarah Saravia  MRN: [id number]  Admission Date: 4/10/2024  Hospital Length of Stay: 83 days  Attending Provider: Janett Jean MD   Primary Care Physician: Deanna, Primary Doctor  Principal Problem:Sepsis    Subjective:     Interval History: NAEON. HD yesterday with UF of 1.4 L. Electrolytes on labs this morning with worsening hyponatremia, hyperkalemia despite dialysis yesterday but with hemolysis present. Stat BMP ordered by primary team, results pending.     Review of patient's allergies indicates:  No Known Allergies  Current Facility-Administered Medications   Medication Frequency    acetaminophen oral solution 650 mg Q6H PRN    ascorbic acid (vitamin C) tablet 500 mg BID    aspirin chewable tablet 81 mg Daily    atorvastatin tablet 40 mg Daily    dextrose 10% bolus 125 mL 125 mL PRN    dextrose 10% bolus 250 mL 250 mL PRN    epoetin merry injection 6,100 Units Every Mon, Wed, Fri    ertapenem (INVANZ) 500 mg in 0.9% NaCl 100 mL IVPB Q24H    fluconazole 40 mg/ml suspension 100 mg Daily    glucagon (human recombinant) injection 1 mg PRN    glucose chewable tablet 16 g PRN    glucose chewable tablet 24 g PRN    heparin (porcine) injection 1,000 Units PRN    heparin (porcine) injection 3,000 Units PRN    hepatitis B virus vacc.rec(PF) injection 20 mcg vaccine x 1 dose    insulin aspart U-100 pen 0-10 Units QID (AC + HS) PRN    insulin glargine U-100 (Lantus) pen 13 Units QHS    metoprolol tartrate (LOPRESSOR) tablet 25 mg BID    ondansetron 4 mg/5 mL solution 4 mg Q6H PRN    oxyCODONE 5 mg/5 mL solution 5 mg Q4H PRN    pantoprazole suspension 40 mg Daily    polyethylene glycol packet 17 g TID    sodium chloride 0.9% bolus 250 mL 250 mL PRN    sodium chloride 0.9% flush 10 mL Q12H PRN    sodium chloride 0.9% flush 10 mL PRN       Objective:     Vital Signs (Most Recent):  Temp: 98.1 °F (36.7 °C) (07/02/24 0752)  Pulse: 95 (07/02/24  0752)  Resp: 18 (07/02/24 0752)  BP: 127/79 (07/02/24 0820)  SpO2: 99 % (07/02/24 0752) Vital Signs (24h Range):  Temp:  [97.9 °F (36.6 °C)-98.5 °F (36.9 °C)] 98.1 °F (36.7 °C)  Pulse:  [] 95  Resp:  [16-20] 18  SpO2:  [99 %-100 %] 99 %  BP: ()/(56-85) 127/79     Weight: 105.5 kg (232 lb 9.4 oz) (06/26/24 1000)  Body mass index is 36.43 kg/m².  Body surface area is 2.23 meters squared.    I/O last 3 completed shifts:  In: 1407.3 [Other:350; NG/GT:900; IV Piggyback:157.3]  Out: 2024 [Drains:5; Other:2019]     Physical Exam  Vitals reviewed.   Constitutional:       Comments: Awake, no verbal response but nods in response to questions.    Eyes:      Conjunctiva/sclera: Conjunctivae normal.   Cardiovascular:      Rate and Rhythm: Normal rate.   Pulmonary:      Effort: Pulmonary effort is normal. No respiratory distress.   Abdominal:      General: There is no distension.      Palpations: Abdomen is soft.   Musculoskeletal:      Comments: Mild bilateral lower extremity edema.    Skin:     General: Skin is warm and dry.   Neurological:      Mental Status: Mental status is at baseline.          Significant Labs:  CBC:   Recent Labs   Lab 07/02/24  0348   WBC 9.11   RBC 4.02   HGB 9.8*   HCT 31.1*      MCV 77*   MCH 24.4*   MCHC 31.5*     CMP:   Recent Labs   Lab 07/02/24  0348   GLU 83   CALCIUM 10.3   ALBUMIN 3.2*   PROT 8.7*   *   K 5.4*   CO2 14*   CL 93*   BUN 34*   CREATININE 8.0*   ALKPHOS 86   ALT 16   AST 38   BILITOT 0.3     All labs within the past 24 hours have been reviewed.     Assessment/Plan:     Renal/  ESRD (end stage renal disease) on dialysis  54 y.o. Black or  Female ESRD-HD [facility]F at [facility] presents to ED on 4/10/2024 with UTI.    Of note, the patient was initiated on RRT in February 2024 after being admitted with ALVARADO 2/2 iATN due to septic shock. Perm cath placed on 2/29/24. She was transferred to LTAC on 3/12. Deemed ESRD at LTAC.  Nephrology consulted for  inpatient ESRD-HD management    Assessment:   - HD completed yesterday. Plans for HD tomorrow.   - Labs today show worsening hyponatremia, hyperkalemia. Hemolysis present. Repeat BMP pending, will determine need for more urgent HD after repeat BMP results.   - Tentative plan for discharge Friday after completion of abx  - Hyponatremia in setting of ESRD - Recommend decreasing FWF. Currently receiving 200 ml TID. Would decrease to 200 BID. Discussed with primary team.  Will attempt to increase UF during HD tomorrow, patient with mild hypotension during recent HD sessions and has only tolerated 1-1.5 L UF. Will consider midodrine to aid in blood pressure with HD if needed to facilitate   - Recommend changing tube feeds to Novasource/Renal diet.   - Continue to monitor intake and output  - Please avoid gadolinium, fleets, phos-based laxatives, NSAIDs  - Dialysis thrice weekly unless more urgent indications arise. Will evaluate RRT requirements Daily.      Lab Results   Component Value Date    FESATURATED 10 (L) 03/15/2024    FERRITIN 414 (H) 03/15/2024       - Goal in ESRD is Hgb of 10-11. Continue EPO.       Secondary hyperparathyroid of renal origin   - no indication for phos binders       ID  * Sepsis  - Management per primary  - Currently on IV Ertapenem    Endocrine  Hyponatremia  See ESRD           Thank you for your consult. I will follow-up with patient. Please contact us if you have any additional questions.    Delfina Shi, POONAM  Nephrology  [facility] - Telemetry Stepdown

## 2024-07-02 NOTE — ASSESSMENT & PLAN NOTE
This patient does have evidence of infective focus. My overall impression is sepsis. Source: Skin and Soft Tissue (location Abdominal). Not requiring pressors. Source control achieved by: vanc/meropenem.  Concerns septic source may be PEG site with associated purulent wound despite Wound Care attempts to protect with barriers. PEG placed by General Surgery 2/2024. General Surgery consulted regarding PEG site ordered CT No evidence of infection  US extremities negative for thrombus   CT pan scan w/ IV contrast without evidence of infection  Interventional nephrology consulted for line eval - low suspicion for clot, getting blood cx to r/o infected line  Peripheral smear with findings concerning for infection    Tunneled catheter removed and replaced  Catheter line + for proteus    - Id consulted, Started IV ertapenem 500 mg daily, now on day 4 of 7 day course. Plan to finish course prior to discharge.

## 2024-07-02 NOTE — PT/OT/SLP PROGRESS
Physical Therapy Treatment    Patient Name:  Sarah Saravia   MRN:  [id number]    Recommendations:     Discharge Recommendations: Moderate Intensity Therapy  Discharge Equipment Recommendations: hospital bed, lift device, wheelchair  Barriers to discharge: Decreased caregiver support Pt requiring increased skilled assistance at current time.     Assessment:     Sarah Saravia is a 54 y.o. female admitted with a medical diagnosis of Sepsis.  She presents with the following impairments/functional limitations: weakness, impaired endurance, impaired self care skills, impaired functional mobility, impaired balance, decreased coordination, decreased upper extremity function, decreased lower extremity function, decreased safety awareness, decreased ROM, impaired coordination  requiring total/max assistance and verbal cues for bed mob, scooting to EOB/HOB due to weakness, fatigue.   In light of pt's current functional level and deficits, it is anticipated that pt will need to participate in a moderate intensity rehab program consisting of PT and OT in order to achieve full rehab potential to return to previous level of function and roles.  Pt remains motivated to participate in PT session and will cont to benefit from skilled PT intervention.  .    Rehab Prognosis: Fair; patient would benefit from acute skilled PT services to address these deficits and reach maximum level of function.    Recent Surgery: Procedure(s) (LRB):  Insertion, Catheter, Central Venous, Hemodialysis (Right) 5 Days Post-Op    Plan:     During this hospitalization, patient to be seen 3 x/week to address the identified rehab impairments via gait training, therapeutic activities, therapeutic exercises, neuromuscular re-education and progress toward the following goals:    Plan of Care Expires:  07/22/24    Subjective     Chief Complaint: weakness, fatigue  Pain/Comfort:  Pain Rating 1: 0/10  Pain Rating Post-Intervention 1: 0/10      Objective:      Communicated with nurse (Yohana) prior to session.  Patient found HOB elevated at 35* angle with PEG Tube upon PT entry to room.     General Precautions: Standard, aphasia, aspiration, fall, NPO  Orthopedic Precautions: N/A  Braces: N/A  Respiratory Status: Room air     Functional Mobility:  Bed Mobility:     Rolling Left:  maximal assistance  Rolling Right: maximal assistance  Scooting: anteriorly to the EOB with max A of 2 using drawsheet; to HOB dependent of 2 via drawsheet  Supine to Sit: max A of 2 for trunk elevation and LE's, exiting on the R side, HOB at 20* with increased time   Sit to Supine: total A of 2 for trunk and LE's, HOB flat  Balance: static/dynamic sitting at the EOB with close sup 20 min      AM-PAC 6 CLICK MOBILITY  Turning over in bed (including adjusting bedclothes, sheets and blankets)?: 2  Sitting down on and standing up from a chair with arms (e.g., wheelchair, bedside commode, etc.): 1  Moving from lying on back to sitting on the side of the bed?: 2  Moving to and from a bed to a chair (including a wheelchair)?: 1  Need to walk in hospital room?: 1  Climbing 3-5 steps with a railing?: 1  Basic Mobility Total Score: 8       Treatment & Education:  Patient provided with daily orientation and goals of this PT session. They were educated to call for assistance and to transfer with hospital staff only.  Also, pt was educated on the effects of prolonged immobility and the importance of performing OOB activity and exercises to promote healing and reduce recovery time    Patient left HOB elevated with all lines intact and call button in reach..    GOALS:   Multidisciplinary Problems       Physical Therapy Goals          Problem: Physical Therapy    Goal Priority Disciplines Outcome Goal Variances Interventions   Physical Therapy Goal     PT, PT/OT Progressing     Description: Goals to be met by: 05/07/24   Goals remain appropriate 5/3/2024 to be met by 5/17/24  Goals updated 5/13/2024 to be  met by 24  Goals updated 24 to be met by 24  Goals remain appropriate 24 to be met by 24  Goals remain appropriate to be met by 24    Patient will increase functional independence with mobility by performin. Supine to sit with Maximum Assistance  2. Sit to supine with Maximum Assistance  3. Rolling to Left and Right with Moderate Assistance.  4. Sitting at edge of bed 5 minutes with Moderate Assistance- MET , monitor consistency  4a. Sitting at edge of bed 10 minutes with Supervision  5. Lower extremity exercise program x20 reps per handout, with assistance as needed  6. Sit to stand transfer with Maximum Assistance with or without LRAD  7. Stand for 2 minutes with Maximum Assistance with or without LRAD                         Time Tracking:     PT Received On: 24  PT Start Time: 1450     PT Stop Time: 1520  PT Total Time (min): 30 min     Billable Minutes: Therapeutic Activity 30    Treatment Type: Treatment  PT/PTA: PTA     Number of PTA visits since last PT visit: 4     2024

## 2024-07-02 NOTE — PLAN OF CARE
Problem: Infection  Goal: Absence of Infection Signs and Symptoms  Outcome: Progressing     Problem: Skin Injury Risk Increased  Goal: Skin Health and Integrity  Outcome: Progressing     Problem: Adult Inpatient Plan of Care  Goal: Plan of Care Review  Outcome: Progressing     Problem: Hemodialysis  Goal: Safe, Effective Therapy Delivery  Outcome: Progressing  Goal: Effective Tissue Perfusion  Outcome: Progressing  Goal: Absence of Infection Signs and Symptoms  Outcome: Progressing     Problem: Chronic Kidney Disease  Goal: Optimal Coping with Chronic Illness  Outcome: Progressing  Goal: Electrolyte Balance  Outcome: Progressing  Goal: Fluid Balance  Outcome: Progressing  Goal: Optimal Functional Ability  Outcome: Progressing  Goal: Absence of Anemia Signs and Symptoms  Outcome: Progressing  Goal: Optimal Oral Intake  Outcome: Progressing  Goal: Acceptable Pain Control  Outcome: Progressing  Goal: Minimize Renal Failure Effects  Outcome: Progressing

## 2024-07-02 NOTE — ASSESSMENT & PLAN NOTE
We will monitor sodium Daily. The hyponatremia is due to ESRD. Discussed with nephrology, dialysate bath adjusted to account for and correct hyponatremia.   Hyponatremia has been stably low. Now decreased to 126. Ordered urine osm, serum osm, and urine sodium. Also per dialysis NP recs, changed FWF from TID to BID.    Recent Labs   Lab 07/02/24  0348   *

## 2024-07-03 LAB
POCT GLUCOSE: 111 MG/DL (ref 70–110)
POCT GLUCOSE: 114 MG/DL (ref 70–110)
POCT GLUCOSE: 133 MG/DL (ref 70–110)
POCT GLUCOSE: 93 MG/DL (ref 70–110)

## 2024-07-03 PROCEDURE — 63600175 PHARM REV CODE 636 W HCPCS: Performed by: STUDENT IN AN ORGANIZED HEALTH CARE EDUCATION/TRAINING PROGRAM

## 2024-07-03 PROCEDURE — 20600001 HC STEP DOWN PRIVATE ROOM

## 2024-07-03 PROCEDURE — 92526 ORAL FUNCTION THERAPY: CPT

## 2024-07-03 PROCEDURE — 63600175 PHARM REV CODE 636 W HCPCS

## 2024-07-03 PROCEDURE — 25000003 PHARM REV CODE 250

## 2024-07-03 PROCEDURE — 99232 SBSQ HOSP IP/OBS MODERATE 35: CPT | Mod: ,,, | Performed by: NURSE PRACTITIONER

## 2024-07-03 PROCEDURE — 25000003 PHARM REV CODE 250: Performed by: STUDENT IN AN ORGANIZED HEALTH CARE EDUCATION/TRAINING PROGRAM

## 2024-07-03 PROCEDURE — 63600175 PHARM REV CODE 636 W HCPCS: Performed by: NURSE PRACTITIONER

## 2024-07-03 PROCEDURE — 80100014 HC HEMODIALYSIS 1:1

## 2024-07-03 PROCEDURE — 92507 TX SP LANG VOICE COMM INDIV: CPT

## 2024-07-03 PROCEDURE — 27000207 HC ISOLATION

## 2024-07-03 RX ADMIN — INSULIN GLARGINE 13 UNITS: 100 INJECTION, SOLUTION SUBCUTANEOUS at 08:07

## 2024-07-03 RX ADMIN — PANTOPRAZOLE SODIUM 40 MG: 40 GRANULE, DELAYED RELEASE ORAL at 10:07

## 2024-07-03 RX ADMIN — ATORVASTATIN CALCIUM 40 MG: 40 TABLET, FILM COATED ORAL at 10:07

## 2024-07-03 RX ADMIN — ERTAPENEM 500 MG: 1 INJECTION INTRAMUSCULAR; INTRAVENOUS at 08:07

## 2024-07-03 RX ADMIN — ASPIRIN 81 MG CHEWABLE TABLET 81 MG: 81 TABLET CHEWABLE at 10:07

## 2024-07-03 RX ADMIN — POLYETHYLENE GLYCOL 3350 17 G: 17 POWDER, FOR SOLUTION ORAL at 08:07

## 2024-07-03 RX ADMIN — HEPARIN SODIUM 1000 UNITS: 1000 INJECTION, SOLUTION INTRAVENOUS; SUBCUTANEOUS at 08:07

## 2024-07-03 RX ADMIN — Medication 500 MG: at 08:07

## 2024-07-03 RX ADMIN — METOPROLOL TARTRATE 25 MG: 25 TABLET, FILM COATED ORAL at 10:07

## 2024-07-03 RX ADMIN — ERYTHROPOIETIN 6100 UNITS: 10000 INJECTION, SOLUTION INTRAVENOUS; SUBCUTANEOUS at 07:07

## 2024-07-03 RX ADMIN — FLUCONAZOLE 100 MG: 40 POWDER, FOR SUSPENSION ORAL at 08:07

## 2024-07-03 RX ADMIN — Medication 500 MG: at 10:07

## 2024-07-03 RX ADMIN — METOPROLOL TARTRATE 25 MG: 25 TABLET, FILM COATED ORAL at 08:07

## 2024-07-03 NOTE — CONSULTS
[facility] - Telemetry Stepdown  Adult Nutrition  Consult Note    SUMMARY     Recommendations    If Diabetic formula warranted, modify TF formula to Glucerna 1.5. As tolerated, increase to goal rate of 50 mL/hr = 1800 kcals, 99 g of protein, 911 mL fluid.   If Renal formula warranted, rec'd Novasource @ 40 mL/hr = 1920 kcals, 87 g of protein, 688 mL fluid.   RD to monitor & follow-up.    Goals: Meet % EEN, EPN by RD f/u date  Nutrition Goal Status: goal not met  Communication of RD Recs: reviewed with physician    Assessment and Plan    Moderate malnutrition    Malnutrition Type:  Context: acute illness or injury  Level: moderate    Related to (etiology):   Inability to consume sufficient energy     Signs and Symptoms (as evidenced by):   Weight loss, NFPE, Edema    Malnutrition Characteristic Summary:  Weight Loss (Malnutrition): 10% in 6 months  Subcutaneous Fat (Malnutrition): mild depletion  Muscle Mass (Malnutrition): mild depletion  Fluid Accumulation (Malnutrition): mild    Interventions/Recommendations (treatment strategy):  Collaboration of nutrition care w/ other providers  EN    Nutrition Diagnosis Status:   Continues     Malnutrition Assessment    Malnutrition Context: acute illness or injury  Malnutrition Level: moderate    Weight Loss (Malnutrition): 10% in 6 months  Subcutaneous Fat (Malnutrition): mild depletion  Muscle Mass (Malnutrition): mild depletion  Fluid Accumulation (Malnutrition): mild     Reason for Assessment    Reason For Assessment: consult, RD follow-up  Diagnosis: other (see comments) (Acute cystitis with hematuria)  Relevant Medical History: CVA, PEG, DM  Interdisciplinary Rounds: did not attend    General Information Comments: Remains NPO (per SLP), TFs infusing via PEG. Pt received HD overnight. Moderate malnutrition diagnosis continues - please see PES statement for details.   Nutrition Discharge Planning: Adequate nutrition    Nutrition/Diet History    Spiritual, Cultural  "Beliefs, Spiritism Practices, Values that Affect Care: no  Food Allergies: NKFA  Factors Affecting Nutritional Intake: NPO    Anthropometrics    Temp: 97.3 °F (36.3 °C)  Height Method: Stated  Height: 5' 7" (170.2 cm)  Height (inches): 67 in  Weight Method: Bed Scale  Weight: 105.5 kg (232 lb 9.4 oz)  Weight (lb): 232.59 lb  Ideal Body Weight (IBW), Female: 135 lb  % Ideal Body Weight, Female (lb): 172.29 %  BMI (Calculated): 36.4  BMI Grade: 35 - 39.9 - obesity - grade II  Usual Body Weight (UBW), kg: (!) 136.4 kg  % Usual Body Weight: 90  % Weight Change From Usual Weight: -10.19 %    Lab/Procedures/Meds    Pertinent Labs Reviewed: reviewed  Pertinent Labs Comments: Creat 8.7, GFR 5, P 5.3, A1C 6.2  Pertinent Medications Reviewed: reviewed  Pertinent Medications Comments: -    Estimated/Assessed Needs    Weight Used For Calorie Calculations: 105.5 kg (232 lb 9.4 oz)    Energy Calorie Requirements (kcal): 2030 kcal/d  Energy Need Method: [place]-St Jeor (1.2 PAL)    Protein Requirements: 95 g/d (.9 g/kg)  Weight Used For Protein Calculations: 105.5 kg (232 lb 9.4 oz)    Estimated Fluid Requirement Method: other (see comments) (Per MD)  RDA Method (mL): 2030    CHO Requirement: 254g    Nutrition Prescription Ordered    Current Diet Order: NPO  Current Nutrition Support Formula Ordered: Diabetisource AC  Current Nutrition Support Rate Ordered: 15 mL/hr    Evaluation of Received Nutrient/Fluid Intake    Enteral Calories (kcal): 432  Enteral Protein (gm): 22  Enteral (Free Water) Fluid (mL): 295  Free Water Flush Fluid (mL): 400    % Kcal Needs: 21%  % Protein Needs: 23%    I/O: -3.5L since 6/19    Energy Calories Required: not meeting needs  Protein Required: not meeting needs  Fluid Required: other (see comments) (Per MD)    Comments: LBM: 7/1    Tolerance: tolerating    Nutrition Risk    Level of Risk/Frequency of Follow-up:  (1x/week)     Monitor and Evaluation    Food and Nutrient Intake: enteral nutrition " intake  Food and Nutrient Adminstration: enteral and parenteral nutrition administration  Physical Activity and Function: nutrition-related ADLs and IADLs  Anthropometric Measurements: weight, weight change  Biochemical Data, Medical Tests and Procedures: inflammatory profile, lipid profile, glucose/endocrine profile, gastrointestinal profile, electrolyte and renal panel  Nutrition-Focused Physical Findings: overall appearance     Nutrition Follow-Up    RD Follow-up?: Yes

## 2024-07-03 NOTE — PROGRESS NOTES
[facility] - Telemetry Stepdown  Nephrology  Progress Note    Patient Name: Sarah Saravia  MRN: [id number]  Admission Date: 4/10/2024  Hospital Length of Stay: 84 days  Attending Provider: Janett Jean MD   Primary Care Physician: Deanna, Primary Doctor  Principal Problem:Sepsis    Subjective:     Interval History: HD overnight, requiring cathflo. Net UF 1L. Hyponatremic. FWF decreased. Labs pending.     Review of patient's allergies indicates:  No Known Allergies  Current Facility-Administered Medications   Medication Frequency    acetaminophen oral solution 650 mg Q6H PRN    ascorbic acid (vitamin C) tablet 500 mg BID    aspirin chewable tablet 81 mg Daily    atorvastatin tablet 40 mg Daily    dextrose 10% bolus 125 mL 125 mL PRN    dextrose 10% bolus 250 mL 250 mL PRN    epoetin merry injection 6,100 Units Every Mon, Wed, Fri    ertapenem (INVANZ) 500 mg in 0.9% NaCl 100 mL IVPB Q24H    fluconazole 40 mg/ml suspension 100 mg Daily    glucagon (human recombinant) injection 1 mg PRN    glucose chewable tablet 16 g PRN    glucose chewable tablet 24 g PRN    heparin (porcine) injection 1,000 Units PRN    heparin (porcine) injection 3,000 Units PRN    hepatitis B virus vacc.rec(PF) injection 20 mcg vaccine x 1 dose    insulin aspart U-100 pen 0-10 Units QID (AC + HS) PRN    insulin glargine U-100 (Lantus) pen 13 Units QHS    metoprolol tartrate (LOPRESSOR) tablet 25 mg BID    ondansetron 4 mg/5 mL solution 4 mg Q6H PRN    oxyCODONE 5 mg/5 mL solution 5 mg Q4H PRN    pantoprazole suspension 40 mg Daily    polyethylene glycol packet 17 g TID    sodium chloride 0.9% bolus 250 mL 250 mL PRN    sodium chloride 0.9% flush 10 mL Q12H PRN    sodium chloride 0.9% flush 10 mL PRN       Objective:     Vital Signs (Most Recent):  Temp: 97.3 °F (36.3 °C) (07/03/24 0530)  Pulse: 101 (07/03/24 0909)  Resp: 16 (07/03/24 0909)  BP: 102/70 (07/03/24 0909)  SpO2: 100 % (07/03/24 0354) Vital Signs (24h Range):  Temp:  [97.3 °F (36.3  °C)-98.2 °F (36.8 °C)] 97.3 °F (36.3 °C)  Pulse:  [] 101  Resp:  [14-18] 16  SpO2:  [97 %-100 %] 100 %  BP: ()/(8-90) 102/70     Weight: 105.5 kg (232 lb 9.4 oz) (06/26/24 1000)  Body mass index is 36.43 kg/m².  Body surface area is 2.23 meters squared.    I/O last 3 completed shifts:  In: 800 [Other:500; NG/GT:300]  Out: 547 [Drains:5; Other:542]     Physical Exam  Vitals and nursing note reviewed.   Constitutional:       Appearance: Normal appearance.   HENT:      Head: Normocephalic and atraumatic.      Right Ear: External ear normal.      Left Ear: External ear normal.      Nose: Nose normal.      Mouth/Throat:      Mouth: Mucous membranes are moist.   Eyes:      Extraocular Movements: Extraocular movements intact.      Pupils: Pupils are equal, round, and reactive to light.   Cardiovascular:      Rate and Rhythm: Normal rate and regular rhythm.      Pulses: Normal pulses.      Heart sounds: Normal heart sounds.   Abdominal:      General: Abdomen is flat.   Musculoskeletal:         General: Normal range of motion.      Right lower leg: Edema (1+) present.      Left lower leg: Edema (1+) present.   Skin:     General: Skin is warm.      Capillary Refill: Capillary refill takes less than 2 seconds.   Neurological:      General: No focal deficit present.      Mental Status: She is alert and oriented to person, place, and time.          Significant Labs:  CBC:   Recent Labs   Lab 07/02/24  0348   WBC 9.11   RBC 4.02   HGB 9.8*   HCT 31.1*      MCV 77*   MCH 24.4*   MCHC 31.5*     CMP:   Recent Labs   Lab 07/02/24  1252   GLU 96   CALCIUM 10.2   ALBUMIN 3.2*   PROT 8.2   *   K 4.8   CO2 18*   CL 91*   BUN 38*   CREATININE 8.7*   ALKPHOS 86   ALT 14   AST 22   BILITOT 0.3     All labs within the past 24 hours have been reviewed.       Assessment/Plan:     Renal/  ESRD (end stage renal disease) on dialysis  54 y.o. Black or  Female ESRD-HD M-W-F at [facility] presents to ED on  4/10/2024 with UTI.    Of note, the patient was initiated on RRT in February 2024 after being admitted with ALVARADO 2/2 iATN due to septic shock. Perm cath placed on 2/29/24. She was transferred to [facility] on 3/12. Deemed ESRD at LT.  Nephrology consulted for inpatient ESRD-HD management    Assessment:   -  Next HD on 7/5, UF goal 2-3L  - Tentative plan for discharge Friday after completion of abx  - Hyponatremia in setting of ESRD - Recommend decreasing FWF.  - Recommend changing tube feeds to Novasource/Renal diet  - Continue to monitor intake and output  - Please avoid gadolinium, fleets, phos-based laxatives, NSAIDs  - Dialysis thrice weekly unless more urgent indications arise. Will evaluate RRT requirements Daily.      Lab Results   Component Value Date    FESATURATED 10 (L) 03/15/2024    FERRITIN 414 (H) 03/15/2024       - Goal in ESRD is Hgb of 10-11. Continue EPO.             ID  * Sepsis  - Management per primary      Endocrine  Hyponatremia  Hyponatremia in setting of ESRD - recommend decreasing FWF         Thank you for your consult. I will follow-up with patient. Please contact us if you have any additional questions.    Delfina Shi, POONAM  Nephrology  [facility] - Telemetry Stepdown

## 2024-07-03 NOTE — PROGRESS NOTES
07/02/24 2230   Vital Signs   Temp 98 °F (36.7 °C)   Temp Source Oral   Pulse 93   Heart Rate Source Monitor   Resp 16   /70   MAP (mmHg) 95   BP Location Left forearm   BP Method Automatic   Patient Position Lying     HD tx ended due to sluggish access.  Machine keeps alarming, only run for an hour.  Dr De León informed and ordered to TPA the line.

## 2024-07-03 NOTE — PLAN OF CARE
Problem: Chronic Kidney Disease  Goal: Optimal Coping with Chronic Illness  Outcome: Progressing     Problem: Adult Inpatient Plan of Care  Goal: Plan of Care Review  Outcome: Progressing     Problem: Skin Injury Risk Increased  Goal: Skin Health and Integrity  Outcome: Progressing

## 2024-07-03 NOTE — NURSING
.Nurses Note -- 4 Eyes      7/2/2024   7:58 PM      Skin assessed during: Q Shift Change      [] No Altered Skin Integrity Present    []Prevention Measures Documented      [x] Yes- Altered Skin Integrity Present or Discovered   [] LDA Added if Not in Epic (Describe Wound)   [] New Altered Skin Integrity was Present on Admit and Documented in LDA   [] Wound Image Taken    Wound Care Consulted? Yes    Attending Nurse:  Pk Eastman RN/Staff Member:  Yohana

## 2024-07-03 NOTE — PLAN OF CARE
[facility] - Telemetry Stepdown  Discharge Reassessment    Primary Care Provider: No, Primary Doctor    Expected Discharge Date: 7/8/2024    Reassessment (most recent)       Discharge Reassessment - 07/03/24 1531          Discharge Reassessment    Assessment Type Discharge Planning Reassessment     Did the patient's condition or plan change since previous assessment? No     Discharge Plan discussed with: Adult children     Communicated ZEKE with patient/caregiver Yes     Discharge Plan A Return to nursing home   [facility]    Discharge Plan B Return to Nursing Home     DME Needed Upon Discharge  other (see comments)   TBD    Why the patient remains in the hospital Requires continued medical care        Post-Acute Status    Post-Acute Authorization Placement     Post-Acute Placement Status Pending medical clearance/testing     Diaylsis Status Set-up Complete/Auth obtained   [facility]                  Discharge Plan A and Plan B have been determined by review of patient's clinical status, future medical and therapeutic needs, and coverage/benefits for post-acute care in coordination with multidisciplinary team members.     Sara Loredo RN  Ext [phone]

## 2024-07-03 NOTE — ASSESSMENT & PLAN NOTE
Cath intermittently clogging, not resolving with cath-hedy, going for IR venogram 4/30 with possible exchange. Continued concerns about Permacath sluggish flow rate.    - f/u with nephrology about permacath to make sure no concerns prior to discharge  S/p exchange with IR on 4/30  S/p 2nd exchange for concern of line infection in setting occult infection

## 2024-07-03 NOTE — ASSESSMENT & PLAN NOTE
This patient does have evidence of infective focus. My overall impression is sepsis. Source: Skin and Soft Tissue (location Abdominal). Not requiring pressors. Source control achieved by: vanc/meropenem.  Concerns septic source may be PEG site with associated purulent wound despite Wound Care attempts to protect with barriers. PEG placed by General Surgery 2/2024. General Surgery consulted regarding PEG site ordered CT No evidence of infection  US extremities negative for thrombus   CT pan scan w/ IV contrast without evidence of infection  Interventional nephrology consulted for line eval - low suspicion for clot, getting blood cx to r/o infected line  Peripheral smear with findings concerning for infection    Tunneled catheter removed and replaced  Catheter line + for proteus    - Id consulted, Started IV ertapenem 500 mg daily, now on day 5 of 7 day course. Plan to finish course prior to discharge.

## 2024-07-03 NOTE — PROGRESS NOTES
Bedside HD completed, Blood returned and cathetr ports  flushed with normal saline. , heparin instilled into each port as indicated. Ports capped and secured. Post B/P 102/70 . Pi;se 101 . Net uf removed 1 liter. Patient responds slowly  to verbal stimuli .

## 2024-07-03 NOTE — PROGRESS NOTES
07/02/24 2130   Vital Signs   Temp 98.2 °F (36.8 °C)   Temp Source Oral   Pulse 94   Heart Rate Source Monitor   Resp 16   Device (Oxygen Therapy) room air   BP (!) 130/90   MAP (mmHg) 103   BP Location Left forearm   BP Method Automatic   Patient Position Lying     Pt is alert and stable for tx.  Report received from the primary RN.  Bedside HD1:1 tx initiated aseptically via RIJ TDC without issue.

## 2024-07-03 NOTE — SUBJECTIVE & OBJECTIVE
Interval History: HD overnight, requiring cathflo. Net UF 1L. Hyponatremic. FWF decreased. Labs pending.     Review of patient's allergies indicates:  No Known Allergies  Current Facility-Administered Medications   Medication Frequency    acetaminophen oral solution 650 mg Q6H PRN    ascorbic acid (vitamin C) tablet 500 mg BID    aspirin chewable tablet 81 mg Daily    atorvastatin tablet 40 mg Daily    dextrose 10% bolus 125 mL 125 mL PRN    dextrose 10% bolus 250 mL 250 mL PRN    epoetin merry injection 6,100 Units Every Mon, Wed, Fri    ertapenem (INVANZ) 500 mg in 0.9% NaCl 100 mL IVPB Q24H    fluconazole 40 mg/ml suspension 100 mg Daily    glucagon (human recombinant) injection 1 mg PRN    glucose chewable tablet 16 g PRN    glucose chewable tablet 24 g PRN    heparin (porcine) injection 1,000 Units PRN    heparin (porcine) injection 3,000 Units PRN    hepatitis B virus vacc.rec(PF) injection 20 mcg vaccine x 1 dose    insulin aspart U-100 pen 0-10 Units QID (AC + HS) PRN    insulin glargine U-100 (Lantus) pen 13 Units QHS    metoprolol tartrate (LOPRESSOR) tablet 25 mg BID    ondansetron 4 mg/5 mL solution 4 mg Q6H PRN    oxyCODONE 5 mg/5 mL solution 5 mg Q4H PRN    pantoprazole suspension 40 mg Daily    polyethylene glycol packet 17 g TID    sodium chloride 0.9% bolus 250 mL 250 mL PRN    sodium chloride 0.9% flush 10 mL Q12H PRN    sodium chloride 0.9% flush 10 mL PRN       Objective:     Vital Signs (Most Recent):  Temp: 97.3 °F (36.3 °C) (07/03/24 0530)  Pulse: 101 (07/03/24 0909)  Resp: 16 (07/03/24 0909)  BP: 102/70 (07/03/24 0909)  SpO2: 100 % (07/03/24 0354) Vital Signs (24h Range):  Temp:  [97.3 °F (36.3 °C)-98.2 °F (36.8 °C)] 97.3 °F (36.3 °C)  Pulse:  [] 101  Resp:  [14-18] 16  SpO2:  [97 %-100 %] 100 %  BP: ()/(8-90) 102/70     Weight: 105.5 kg (232 lb 9.4 oz) (06/26/24 1000)  Body mass index is 36.43 kg/m².  Body surface area is 2.23 meters squared.    I/O last 3 completed shifts:  In:  800 [Other:500; NG/GT:300]  Out: 547 [Drains:5; Other:542]     Physical Exam  Vitals and nursing note reviewed.   Constitutional:       Appearance: Normal appearance.   HENT:      Head: Normocephalic and atraumatic.      Right Ear: External ear normal.      Left Ear: External ear normal.      Nose: Nose normal.      Mouth/Throat:      Mouth: Mucous membranes are moist.   Eyes:      Extraocular Movements: Extraocular movements intact.      Pupils: Pupils are equal, round, and reactive to light.   Cardiovascular:      Rate and Rhythm: Normal rate and regular rhythm.      Pulses: Normal pulses.      Heart sounds: Normal heart sounds.   Abdominal:      General: Abdomen is flat.   Musculoskeletal:         General: Normal range of motion.      Right lower leg: Edema (1+) present.      Left lower leg: Edema (1+) present.   Skin:     General: Skin is warm.      Capillary Refill: Capillary refill takes less than 2 seconds.   Neurological:      General: No focal deficit present.      Mental Status: She is alert and oriented to person, place, and time.          Significant Labs:  CBC:   Recent Labs   Lab 07/02/24  0348   WBC 9.11   RBC 4.02   HGB 9.8*   HCT 31.1*      MCV 77*   MCH 24.4*   MCHC 31.5*     CMP:   Recent Labs   Lab 07/02/24  1252   GLU 96   CALCIUM 10.2   ALBUMIN 3.2*   PROT 8.2   *   K 4.8   CO2 18*   CL 91*   BUN 38*   CREATININE 8.7*   ALKPHOS 86   ALT 14   AST 22   BILITOT 0.3     All labs within the past 24 hours have been reviewed.

## 2024-07-03 NOTE — ASSESSMENT & PLAN NOTE
54 y.o. Black or  Female ESRD-HD M-W-F at [facility] presents to ED on 4/10/2024 with UTI.    Of note, the patient was initiated on RRT in February 2024 after being admitted with ALVARADO 2/2 iATN due to septic shock. Perm cath placed on 2/29/24. She was transferred to [facility] on 3/12. Deemed ESRD at [facility].  Nephrology consulted for inpatient ESRD-HD management    Assessment:   -  Next HD on 7/5, UF goal 2-3L  - Tentative plan for discharge Friday after completion of abx  - Hyponatremia in setting of ESRD - Recommend decreasing FWF.  - Recommend changing tube feeds to Novasource/Renal diet  - Continue to monitor intake and output  - Please avoid gadolinium, fleets, phos-based laxatives, NSAIDs  - Dialysis thrice weekly unless more urgent indications arise. Will evaluate RRT requirements Daily.      Lab Results   Component Value Date    FESATURATED 10 (L) 03/15/2024    FERRITIN 414 (H) 03/15/2024       - Goal in ESRD is Hgb of 10-11. Continue EPO.

## 2024-07-03 NOTE — PROGRESS NOTES
07/03/24 0530   Vital Signs   Temp 97.3 °F (36.3 °C)   Temp Source Axillary   Pulse 89   Heart Rate Source Manual;Monitor   Resp 16   Device (Oxygen Therapy) room air   /71   MAP (mmHg) 88   BP Location Left forearm   BP Method Automatic   Patient Position Lying     Both ports aspirated after TPA and its still sluggish.  Bedside HD 1:1 tx started aseptically.

## 2024-07-03 NOTE — SUBJECTIVE & OBJECTIVE
Interval History: NAEON. VSS. Permacath rate sluggish despite trial of TPA in line. PT reccommended moderate intensity rehab program with PT/OT.     Review of Systems   Unable to perform ROS: Patient nonverbal     Objective:     Vital Signs (Most Recent):  Temp: 97.8 °F (36.6 °C) (07/03/24 1123)  Pulse: 95 (07/03/24 1123)  Resp: 20 (07/03/24 1123)  BP: 108/70 (07/03/24 1123)  SpO2: 95 % (07/03/24 1123) Vital Signs (24h Range):  Temp:  [97.3 °F (36.3 °C)-98.2 °F (36.8 °C)] 97.8 °F (36.6 °C)  Pulse:  [] 95  Resp:  [14-20] 20  SpO2:  [95 %-100 %] 95 %  BP: ()/(8-90) 108/70     Weight: 105.5 kg (232 lb 9.4 oz)  Body mass index is 36.43 kg/m².    Intake/Output Summary (Last 24 hours) at 7/3/2024 1628  Last data filed at 7/3/2024 0909  Gross per 24 hour   Intake 600 ml   Output 2142 ml   Net -1542 ml         Physical Exam  Constitutional:       Appearance: Normal appearance.   HENT:      Head: Normocephalic and atraumatic.      Right Ear: External ear normal.      Left Ear: External ear normal.      Nose: Nose normal.      Mouth/Throat:      Mouth: Mucous membranes are moist.   Eyes:      Extraocular Movements: Extraocular movements intact.      Pupils: Pupils are equal, round, and reactive to light.   Cardiovascular:      Rate and Rhythm: Normal rate and regular rhythm.      Pulses: Normal pulses.      Heart sounds: Normal heart sounds.   Abdominal:      General: Abdomen is flat.   Musculoskeletal:         General: Normal range of motion.   Skin:     General: Skin is warm.      Capillary Refill: Capillary refill takes less than 2 seconds.   Neurological:      General: No focal deficit present.      Mental Status: She is alert and oriented to person, place, and time.             Significant Labs: All pertinent labs within the past 24 hours have been reviewed.    Significant Imaging: I have reviewed all pertinent imaging results/findings within the past 24 hours.

## 2024-07-03 NOTE — PT/OT/SLP PROGRESS
Occupational Therapy      Patient Name:  Sarah Saravia   MRN:  [id number]    Patient not seen today secondary to OT attempted across 2 trials:     10:00am - pt at Dialysis  1:55pm - pt in [facility] care.     OT unable to return this afternoon. Will follow-up 7/5 or as medically appropriate. Pt's plan of care and OT goals reviewed on this date and remain appropriate. Will follow-up for progressive mobility pending continued medical stability and patient participation.       7/3/2024

## 2024-07-03 NOTE — PT/OT/SLP PROGRESS
Speech Language Pathology Treatment    Patient Name:  Sarah Saravia   MRN:  [id number]  Admitting Diagnosis: Sepsis    Recommendations:                 General Recommendations:  Dysphagia therapy and Speech/language therapy  Diet recommendations:  NPO, Liquid Diet Level: NPO Pt ok to receive thin liquids for pleasure.   Aspiration Precautions: Continue alternate means of nutrition, Frequent oral care, and Strict aspiration precautions   General Precautions: Standard, aphasia, aspiration, fall, NPO  Communication strategies:  yes/no questions only and go to room if call light pushed    Assessment:     Sarah Saravia is a 54 y.o. female with an SLP diagnosis of Aphasia, Dysphagia, and Cognitive-Linguistic Impairment.      Subjective     Pt remained nonverbal/nonvocal on this service date.     Pain/Comfort:  Pain Rating 1:  (no indications of pain)    Respiratory Status: Room air    Objective:     Has the patient been evaluated by SLP for swallowing?   Yes  Keep patient NPO? Yes   Current Respiratory Status:        Pt agreeable to receive thin water and juice via straw.  Pt did not respond when asked if she would like to receive purees, but nodded yes for liquids offered.  Pt accepted multiple straw sips of water and juice with occasional holding, but mostly fairly timely initiation of oral transit and swallow responses.  No overt s/s of aspiration observed.  Pt made no attempts to vocalize or verbalize during automatic counting or singing tasks despite max cues.  Pt followed 1-step body movement commands on 1/4 trials given model and 2/4 following hand over hand assistance.  When handed chapstick, pt spread over lips and then pressed lips together appropriately.  Cont POC.     Goals:   Multidisciplinary Problems       SLP Goals          Problem: SLP    Goal Priority Disciplines Outcome   SLP Goal     SLP Progressing   Description: Speech Pathology Goals  To be met by 7/19/24    1. Pt will participate in ongoing  diagnostic dysphagia therapy    2. Pt will answer basic Y/N questions with 50% accuracy given MAX multi-modality cueing  3. Pt will follow single step commands paired with model across 50% of trials   4. Pt will complete automatic speech tasks with 50% accuracy given MAX multi-modality cueing         Speech Language Pathology Goals  Updated goals expected to be met by 5/10 (goals remain appropriate 6/11 - reassess on 6/18:  1. Pt will participate in ongoing swallowing assessment to determine if appropriate for PO trials for pleasure.   2. Pt will model single step command x 1 given max cues.   3. Pt will answer simple yes/no q's during a structured receptive language task x 1 given max cues.   4. Pt will phonate purposefully upon command x 1 given max cues.   5. Pt will attempt to vocalize/verbalize during automatic speech task x 1 given max cues.   6. Pt will participate in evaluation of ability to utilize basic communication board.     Goals expected to be met by 5/8:  1. Pt will participate in Modified Barium Swallow Study to determine if safe for oral intake. Goal met/attempted 5/2                               Plan:     Patient to be seen:  3 x/week   Plan of Care expires:  05/31/24  Plan of Care reviewed with:  patient   SLP Follow-Up:  Yes       Discharge recommendations:  Moderate Intensity Therapy     Time Tracking:     SLP Treatment Date:   07/03/24  Speech Start Time:  1348  Speech Stop Time:  1405     Speech Total Time (min):  17 min    Billable Minutes: Speech Therapy Individual 8 and Treatment Swallowing Dysfunction 9    07/03/2024

## 2024-07-03 NOTE — PROGRESS NOTES
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 History was given from patient's daughter. She was undergoing dialysis today and noticed she was lethargic, less alert than usual self. Pt completed dialysis and still not acting herself. EMS was called, fever of a 100.  On chart review, she did have an episode of large volume emesis around 1700.  Per EMS, she had a slightly elevated temp 100.0°F, glucose 300s.     In the ED: UA 2+ leuks, >100 WBC, many bacteria, WBC 17, CT abd/pelvis concerning for cystitis. Given vanc/zosyn    Overview/Hospital Course:  53 JARROD admitted to Hospital Medicine service for urosepsis. Vanc/zosyn initiated, found to have staph epi in all 4 bottles, vanc/ertapenem course completed per ID. Vascular Neurology consulted concerning mental status change with the recommendation to initiate ASA 81 QD and to obtain an MRI to r/o new stroke. Per discussion with vascular neurology, MRI findings appear to be expected changes from prior stroke. Nephrology was consulted for regularly scheduled dialysis. Pulm consulted, patient decannulated 4/30. Failed swallow assessment, continuing tube feeds. Ongoing placement difficulties d/t need for accepting HD facility to have sandee lift with 2 personnel to operate. Once patient gets accepted at [facility], next step will be to work with daughter on [facility]. SW onboard working on paperwork for long term Medicaid application. H/c c/b new fever, ID reconsulted, CT chest showing bilateral ground glass  opacities, Vanc/meropenem course to be completed 6/17, however patient had further episode of fever and will need continued merem and ID consulted for assistance. CT pan scan to assess for infection. Imaging without signs of infection, patient to complete empiric abx for 5 days. Patient continues fevering despite broad abx, no identified source. Chest x-ray, peripheral smear to assess for other sources. Tunneled catheter removed and replaced via interventional nephrology at recommendation of ID. Catheter tip culture + for proteus. Patient started on ertapenem to complete a 7 day course prior to discharge.     Interval History: NAEON. VSS. Permacath rate sluggish despite trial of TPA in line. PT reccommended moderate intensity rehab program with PT/OT.     Review of Systems   Unable to perform ROS: Patient nonverbal     Objective:     Vital Signs (Most Recent):  Temp: 97.8 °F (36.6 °C) (07/03/24 1123)  Pulse: 95 (07/03/24 1123)  Resp: 20 (07/03/24 1123)  BP: 108/70 (07/03/24 1123)  SpO2: 95 % (07/03/24 1123) Vital Signs (24h Range):  Temp:  [97.3 °F (36.3 °C)-98.2 °F (36.8 °C)] 97.8 °F (36.6 °C)  Pulse:  [] 95  Resp:  [14-20] 20  SpO2:  [95 %-100 %] 95 %  BP: ()/(8-90) 108/70     Weight: 105.5 kg (232 lb 9.4 oz)  Body mass index is 36.43 kg/m².    Intake/Output Summary (Last 24 hours) at 7/3/2024 1628  Last data filed at 7/3/2024 0909  Gross per 24 hour   Intake 600 ml   Output 2142 ml   Net -1542 ml         Physical Exam  Constitutional:       Appearance: Normal appearance.   HENT:      Head: Normocephalic and atraumatic.      Right Ear: External ear normal.      Left Ear: External ear normal.      Nose: Nose normal.      Mouth/Throat:      Mouth: Mucous membranes are moist.   Eyes:      Extraocular Movements: Extraocular movements intact.      Pupils: Pupils are equal, round, and reactive to light.   Cardiovascular:      Rate and Rhythm: Normal rate and regular rhythm.      Pulses: Normal pulses.       Heart sounds: Normal heart sounds.   Abdominal:      General: Abdomen is flat.   Musculoskeletal:         General: Normal range of motion.   Skin:     General: Skin is warm.      Capillary Refill: Capillary refill takes less than 2 seconds.   Neurological:      General: No focal deficit present.      Mental Status: She is alert and oriented to person, place, and time.             Significant Labs: All pertinent labs within the past 24 hours have been reviewed.    Significant Imaging: I have reviewed all pertinent imaging results/findings within the past 24 hours.    Assessment/Plan:      * Sepsis                                                           This patient does have evidence of infective focus. My overall impression is sepsis. Source: Skin and Soft Tissue (location Abdominal). Not requiring pressors. Source control achieved by: vanc/meropenem.  Concerns septic source may be PEG site with associated purulent wound despite Wound Care attempts to protect with barriers. PEG placed by General Surgery 2/2024. General Surgery consulted regarding PEG site ordered CT No evidence of infection  US extremities negative for thrombus   CT pan scan w/ IV contrast without evidence of infection  Interventional nephrology consulted for line eval - low suspicion for clot, getting blood cx to r/o infected line  Peripheral smear with findings concerning for infection    Tunneled catheter removed and replaced  Catheter line + for proteus    - Id consulted, Started IV ertapenem 500 mg daily, now on day 5 of 7 day course. Plan to finish course prior to discharge.    Cervical stenosis of spinal canal  Outpatient MRI and NSGY f/up    Irritant contact dermatitis due friction or contact with other specified body fluids  Wound care following     Debility  Needs:  Wheelchair: patient has a mobility limitation that significantly impairs her ability to participate in one or more mobility related activities of daily living (MRADL's) such  as toileting, feeding, dressing, grooming, and bathing in customary locations in the home.  The mobility limitation cannot be sufficiently resolved by the use of a cane or walker.   The use of a manual wheelchair will significantly improve the patient's ability to participate in MRADLS and the patient will use it on regular basis in the home.  Family/pt has expressed her willingness to use a manual wheelchair in the home.  She also has a caregiver who is capable of assisting in mobility.    Mrs Saravia requires a hospital bed due to her requiring positioning of the body in ways not feasible with an ordinary bed to alleviate pain/ is completely immobile /or limited mobility and cannot independently make changes in body position without the use of the bed.  The positioning of the body cannot be sufficiently resolved by the use of pillows and wedges    Patient has a mobility limitation that significantly impairs their ability to participate in one or more mobility related activities of daily living, including toileting. This deficit can be resolved by using a bedside commode. Patient demonstrates mobility limitations that will cause them to be confined to one room at home without bathroom access for up to 30 days. Using a bedside commode will greatly improve the patient's ability to participate in MRADLs     Complication of vascular dialysis catheter  Cath intermittently clogging, not resolving with cath-hedy, going for IR venogram 4/30 with possible exchange. Continued concerns about Permacath sluggish flow rate.    - f/u with nephrology about permacath to make sure no concerns prior to discharge  S/p exchange with IR on 4/30  S/p 2nd exchange for concern of line infection in setting occult infection    Constipation  Stool burden on CT    -continue bowel reg  -monitor for BMs    Moderate malnutrition  Nutrition consulted. Most recent weight and BMI monitored-     Measurements:  Wt Readings from Last 1 Encounters:  "  06/26/24 105.5 kg (232 lb 9.4 oz)   Body mass index is 36.43 kg/m².    Patient has been screened and assessed by RD.    Malnutrition Type:  Context: acute illness or injury  Level: moderate    Malnutrition Characteristic Summary:  Weight Loss (Malnutrition): 10% in 6 months  Subcutaneous Fat (Malnutrition): mild depletion  Muscle Mass (Malnutrition): mild depletion  Fluid Accumulation (Malnutrition): mild    Interventions/Recommendations (treatment strategy):  - TF  - previous history of failed swallow studies    Prolonged QT interval  Limit Qtc prolonging drugs as able    Spastic hemiplegia of right dominant side as late effect of cerebrovascular disease  Important that patient is able to sit in chair for 4h for dialysis placement needs, even if she requires lift/assistance getting into the chair.This patient has Chronic right hemiplegia due to stroke. Physical therapy services has been scheduled. Continue all standard measures for pressure injury prevention and consult wound care for any wounds (chronic or acute).    ESRD (end stage renal disease) on dialysis  Creatine stable for now. BMP reviewed- noted Estimated Creatinine Clearance: 10.1 mL/min (A) (based on SCr of 8 mg/dL (H)). according to latest data. Based on current GFR, CKD stage is end stage.  Monitor UOP and serial BMP and adjust therapy as needed. Renally dose meds. Avoid nephrotoxic medications and procedures.    -- HD MWF  -- nephro following  -- Hypophosphatemic on sevelamer  -- SW onboard for placement to [facility] to continue dialysis.    PEG (percutaneous endoscopic gastrostomy) status  On PEG tube.Wound care with PEG dressing changes.   CT abdomen with PEG in correct location    - Tube feeds as toelrated    Leukocytosis  See "Sepsis"    Type 2 diabetes mellitus with hyperglycemia, with long-term current use of insulin  Adjusting insulin to account for diabetic TF. POCG in good range.    Patient's FSGs are controlled on current medication " regimen.  Last A1c reviewed-   Lab Results   Component Value Date    HGBA1C 6.2 (H) 05/27/2024     Most recent fingerstick glucose reviewed-   Recent Labs   Lab 07/01/24  1656 07/01/24  2043 07/02/24  0839   POCTGLUCOSE 118* 113* 135*       Current correctional scale  Medium  Maintain anti-hyperglycemic dose as follows-   Antihyperglycemics (From admission, onward)      Start     Stop Route Frequency Ordered    06/09/24 2100  insulin glargine U-100 (Lantus) pen 13 Units         -- SubQ Nightly 06/09/24 0756    06/09/24 1255  insulin aspart U-100 pen 0-10 Units         -- SubQ Before meals & nightly PRN 06/09/24 1155          Hold Oral hypoglycemics while patient is in the hospital.  POCT glucose checks   Continue tube feeds    Hyponatremia  We will monitor sodium Daily. The hyponatremia is due to ESRD. Discussed with nephrology, dialysate bath adjusted to account for and correct hyponatremia.   Hyponatremia has been stably low. Now decreased to 126. Ordered urine osm, serum osm, and urine sodium. Also per dialysis NP recs, changed FWF from TID to BID.    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 Pt underwent extensive course, currently still healing from this, but no longer has wound vac. Picture in media tabs    - Wound care while inpatient  - NH with wound care orders      VTE Risk Mitigation (From admission, onward)           Ordered     heparin (porcine) injection 1,000 Units  As needed (PRN)         06/21/24 0828     heparin (porcine) injection 1,000 Units  As needed (PRN)         06/10/24 0035     heparin (porcine) injection 3,000 Units  As needed (PRN)         04/17/24 0825                    Discharge Planning   ZEKE: 7/8/2024     Code Status: Full Code   Is the patient medically ready for discharge?: No    Reason for patient still in hospital (select all that apply): Treatment  Discharge Plan A: Return to  nursing home ([facility])   Discharge Delays: (!) Payor Issues              Samra Leavitt MD  Department of Hospital Medicine   [facility] - Telemetry Stepdown

## 2024-07-03 NOTE — PT/OT/SLP PROGRESS
Physical Therapy      Patient Name:  Sarah Saravia   MRN:  [id number]    Patient not seen today secondary to Dialysis. Will follow-up as scheduled.

## 2024-07-04 LAB
ALBUMIN SERPL BCP-MCNC: 3.1 G/DL (ref 3.5–5.2)
ALP SERPL-CCNC: 86 U/L (ref 55–135)
ALT SERPL W/O P-5'-P-CCNC: 16 U/L (ref 10–44)
ANION GAP SERPL CALC-SCNC: 15 MMOL/L (ref 8–16)
AST SERPL-CCNC: 26 U/L (ref 10–40)
BASOPHILS # BLD AUTO: 0.08 K/UL (ref 0–0.2)
BASOPHILS NFR BLD: 1.1 % (ref 0–1.9)
BILIRUB SERPL-MCNC: 0.3 MG/DL (ref 0.1–1)
BUN SERPL-MCNC: 23 MG/DL (ref 6–20)
CALCIUM SERPL-MCNC: 10 MG/DL (ref 8.7–10.5)
CHLORIDE SERPL-SCNC: 93 MMOL/L (ref 95–110)
CO2 SERPL-SCNC: 16 MMOL/L (ref 23–29)
CREAT SERPL-MCNC: 6.2 MG/DL (ref 0.5–1.4)
DIFFERENTIAL METHOD BLD: ABNORMAL
EOSINOPHIL # BLD AUTO: 0.4 K/UL (ref 0–0.5)
EOSINOPHIL NFR BLD: 5.6 % (ref 0–8)
ERYTHROCYTE [DISTWIDTH] IN BLOOD BY AUTOMATED COUNT: 16.4 % (ref 11.5–14.5)
EST. GFR  (NO RACE VARIABLE): 7.5 ML/MIN/1.73 M^2
GLUCOSE SERPL-MCNC: 99 MG/DL (ref 70–110)
HCT VFR BLD AUTO: 32.2 % (ref 37–48.5)
HGB BLD-MCNC: 9.7 G/DL (ref 12–16)
IMM GRANULOCYTES # BLD AUTO: 0.16 K/UL (ref 0–0.04)
IMM GRANULOCYTES NFR BLD AUTO: 2.2 % (ref 0–0.5)
LYMPHOCYTES # BLD AUTO: 2.2 K/UL (ref 1–4.8)
LYMPHOCYTES NFR BLD: 29.7 % (ref 18–48)
MAGNESIUM SERPL-MCNC: 2 MG/DL (ref 1.6–2.6)
MCH RBC QN AUTO: 23.5 PG (ref 27–31)
MCHC RBC AUTO-ENTMCNC: 30.1 G/DL (ref 32–36)
MCV RBC AUTO: 78 FL (ref 82–98)
MONOCYTES # BLD AUTO: 1.3 K/UL (ref 0.3–1)
MONOCYTES NFR BLD: 17.1 % (ref 4–15)
NEUTROPHILS # BLD AUTO: 3.2 K/UL (ref 1.8–7.7)
NEUTROPHILS NFR BLD: 44.3 % (ref 38–73)
NRBC BLD-RTO: 0 /100 WBC
OSMOLALITY SERPL: 275 MOSM/KG (ref 275–295)
PHOSPHATE SERPL-MCNC: 4.5 MG/DL (ref 2.7–4.5)
PLATELET # BLD AUTO: 255 K/UL (ref 150–450)
PMV BLD AUTO: 9.9 FL (ref 9.2–12.9)
POCT GLUCOSE: 101 MG/DL (ref 70–110)
POCT GLUCOSE: 102 MG/DL (ref 70–110)
POCT GLUCOSE: 105 MG/DL (ref 70–110)
POCT GLUCOSE: 107 MG/DL (ref 70–110)
POCT GLUCOSE: 110 MG/DL (ref 70–110)
POTASSIUM SERPL-SCNC: 5 MMOL/L (ref 3.5–5.1)
PROT SERPL-MCNC: 7.9 G/DL (ref 6–8.4)
RBC # BLD AUTO: 4.12 M/UL (ref 4–5.4)
SODIUM SERPL-SCNC: 124 MMOL/L (ref 136–145)
TRIGL SERPL-MCNC: 73 MG/DL (ref 30–150)
WBC # BLD AUTO: 7.3 K/UL (ref 3.9–12.7)

## 2024-07-04 PROCEDURE — 25000003 PHARM REV CODE 250

## 2024-07-04 PROCEDURE — 85025 COMPLETE CBC W/AUTO DIFF WBC: CPT

## 2024-07-04 PROCEDURE — 36415 COLL VENOUS BLD VENIPUNCTURE: CPT

## 2024-07-04 PROCEDURE — 27000207 HC ISOLATION

## 2024-07-04 PROCEDURE — 63600175 PHARM REV CODE 636 W HCPCS

## 2024-07-04 PROCEDURE — 84478 ASSAY OF TRIGLYCERIDES: CPT

## 2024-07-04 PROCEDURE — 80100014 HC HEMODIALYSIS 1:1

## 2024-07-04 PROCEDURE — 84100 ASSAY OF PHOSPHORUS: CPT

## 2024-07-04 PROCEDURE — 63600175 PHARM REV CODE 636 W HCPCS: Performed by: NURSE PRACTITIONER

## 2024-07-04 PROCEDURE — 83930 ASSAY OF BLOOD OSMOLALITY: CPT

## 2024-07-04 PROCEDURE — 80053 COMPREHEN METABOLIC PANEL: CPT

## 2024-07-04 PROCEDURE — 20600001 HC STEP DOWN PRIVATE ROOM

## 2024-07-04 PROCEDURE — 25000003 PHARM REV CODE 250: Performed by: STUDENT IN AN ORGANIZED HEALTH CARE EDUCATION/TRAINING PROGRAM

## 2024-07-04 PROCEDURE — 83735 ASSAY OF MAGNESIUM: CPT

## 2024-07-04 RX ORDER — SODIUM BICARBONATE 650 MG/1
650 TABLET ORAL 3 TIMES DAILY
Status: DISCONTINUED | OUTPATIENT
Start: 2024-07-04 | End: 2024-07-08

## 2024-07-04 RX ADMIN — HEPARIN SODIUM 3000 UNITS: 1000 INJECTION, SOLUTION INTRAVENOUS; SUBCUTANEOUS at 10:07

## 2024-07-04 RX ADMIN — POLYETHYLENE GLYCOL 3350 17 G: 17 POWDER, FOR SOLUTION ORAL at 04:07

## 2024-07-04 RX ADMIN — SODIUM BICARBONATE 650 MG: 650 TABLET ORAL at 03:07

## 2024-07-04 RX ADMIN — METOPROLOL TARTRATE 25 MG: 25 TABLET, FILM COATED ORAL at 09:07

## 2024-07-04 RX ADMIN — HEPARIN SODIUM 1000 UNITS: 1000 INJECTION, SOLUTION INTRAVENOUS; SUBCUTANEOUS at 09:07

## 2024-07-04 RX ADMIN — SODIUM BICARBONATE 650 MG: 650 TABLET ORAL at 09:07

## 2024-07-04 RX ADMIN — POLYETHYLENE GLYCOL 3350 17 G: 17 POWDER, FOR SOLUTION ORAL at 09:07

## 2024-07-04 RX ADMIN — PANTOPRAZOLE SODIUM 40 MG: 40 GRANULE, DELAYED RELEASE ORAL at 09:07

## 2024-07-04 RX ADMIN — Medication 500 MG: at 09:07

## 2024-07-04 RX ADMIN — ERTAPENEM 500 MG: 1 INJECTION INTRAMUSCULAR; INTRAVENOUS at 09:07

## 2024-07-04 RX ADMIN — ATORVASTATIN CALCIUM 40 MG: 40 TABLET, FILM COATED ORAL at 09:07

## 2024-07-04 RX ADMIN — ASPIRIN 81 MG CHEWABLE TABLET 81 MG: 81 TABLET CHEWABLE at 09:07

## 2024-07-04 RX ADMIN — FLUCONAZOLE 100 MG: 40 POWDER, FOR SUSPENSION ORAL at 10:07

## 2024-07-04 RX ADMIN — INSULIN GLARGINE 13 UNITS: 100 INJECTION, SOLUTION SUBCUTANEOUS at 09:07

## 2024-07-04 NOTE — NURSING
Report received from Babs.  Patient remains free from fall and injury. NAD noted, VSS, patient non verbal. Bed locked and in low position, Safety maintained. Call light in reach, white board updated and explained, no c/o pain n/v, diarrhea or sob, will cont with POC      Nurses Note -- 4 Eyes      7/4/2024   10:01 AM      Skin assessed during: Daily Assessment      [] No Altered Skin Integrity Present    []Prevention Measures Documented      [x] Yes- Altered Skin Integrity Present or Discovered   [] LDA Added if Not in Epic (Describe Wound)   [] New Altered Skin Integrity was Present on Admit and Documented in LDA   [] Wound Image Taken    Wound Care Consulted? No    Attending Nurse:  Champ Eastman RN/Staff Member:  Babs

## 2024-07-04 NOTE — SUBJECTIVE & OBJECTIVE
Interval History: NAEO, VSS< remains afebrile, to finish abx course tomorrow. Pending discharge    Review of Systems   Unable to perform ROS: Patient nonverbal     Objective:     Vital Signs (Most Recent):  Temp: 98.7 °F (37.1 °C) (07/04/24 0744)  Pulse: 94 (07/04/24 0744)  Resp: 18 (07/04/24 0744)  BP: 105/73 (07/04/24 0744)  SpO2: 100 % (07/04/24 0744) Vital Signs (24h Range):  Temp:  [97.8 °F (36.6 °C)-98.7 °F (37.1 °C)] 98.7 °F (37.1 °C)  Pulse:  [] 94  Resp:  [14-20] 18  SpO2:  [95 %-100 %] 100 %  BP: ()/(45-82) 105/73     Weight: 105.5 kg (232 lb 9.4 oz)  Body mass index is 36.43 kg/m².    Intake/Output Summary (Last 24 hours) at 7/4/2024 0828  Last data filed at 7/4/2024 0554  Gross per 24 hour   Intake 881.83 ml   Output 1600 ml   Net -718.17 ml         Physical Exam  Constitutional:       Appearance: Normal appearance.   HENT:      Head: Normocephalic and atraumatic.      Right Ear: External ear normal.      Left Ear: External ear normal.      Nose: Nose normal.      Mouth/Throat:      Mouth: Mucous membranes are moist.   Eyes:      Extraocular Movements: Extraocular movements intact.      Pupils: Pupils are equal, round, and reactive to light.   Cardiovascular:      Rate and Rhythm: Normal rate and regular rhythm.      Pulses: Normal pulses.      Heart sounds: Normal heart sounds.   Abdominal:      General: Abdomen is flat.   Musculoskeletal:         General: Normal range of motion.   Skin:     General: Skin is warm.      Capillary Refill: Capillary refill takes less than 2 seconds.   Neurological:      General: No focal deficit present.      Mental Status: She is alert and oriented to person, place, and time.             Significant Labs: All pertinent labs within the past 24 hours have been reviewed.    Significant Imaging: I have reviewed all pertinent imaging results/findings within the past 24 hours.

## 2024-07-04 NOTE — ASSESSMENT & PLAN NOTE
This patient does have evidence of infective focus. My overall impression is sepsis. Source: Skin and Soft Tissue (location Abdominal). Not requiring pressors. Source control achieved by: vanc/meropenem.  Concerns septic source may be PEG site with associated purulent wound despite Wound Care attempts to protect with barriers. PEG placed by General Surgery 2/2024. General Surgery consulted regarding PEG site ordered CT No evidence of infection  US extremities negative for thrombus   CT pan scan w/ IV contrast without evidence of infection  Interventional nephrology consulted for line eval - low suspicion for clot, getting blood cx to r/o infected line  Peripheral smear with findings concerning for infection    Tunneled catheter removed and replaced  Catheter line + for proteus    - Id consulted, continue IV ertapenem 500 mg daily, now on day 5 of 7 day course. Plan to finish course prior to discharge.

## 2024-07-04 NOTE — ASSESSMENT & PLAN NOTE
Creatine stable for now. BMP reviewed- noted Estimated Creatinine Clearance: 13 mL/min (A) (based on SCr of 6.2 mg/dL (H)). according to latest data. Based on current GFR, CKD stage is end stage.  Monitor UOP and serial BMP and adjust therapy as needed. Renally dose meds. Avoid nephrotoxic medications and procedures.    -- HD MWF  -- nephro following  -- Hypophosphatemic on sevelamer  -- SW onboard for placement to [facility] to continue dialysis.

## 2024-07-04 NOTE — ASSESSMENT & PLAN NOTE
Cath intermittently clogging, not resolving with cath-hedy, going for IR venogram 4/30 with possible exchange. Continued concerns about Permacath sluggish flow rate.  S/p exchange with IR on 4/30  S/p 2nd exchange for concern of line infection in setting occult infection    - f/u with nephrology about permacath to make sure no concerns prior to discharge

## 2024-07-04 NOTE — ASSESSMENT & PLAN NOTE
Adjusting insulin to account for diabetic TF. POCG in good range.    Patient's FSGs are controlled on current medication regimen.  Last A1c reviewed-   Lab Results   Component Value Date    HGBA1C 6.2 (H) 05/27/2024     Most recent fingerstick glucose reviewed-   Recent Labs   Lab 07/03/24  1724 07/03/24  2044 07/04/24  0935 07/04/24  1314   POCTGLUCOSE 133* 114* 102 110       Current correctional scale  Medium  Maintain anti-hyperglycemic dose as follows-   Antihyperglycemics (From admission, onward)    Start     Stop Route Frequency Ordered    06/09/24 2100  insulin glargine U-100 (Lantus) pen 13 Units         -- SubQ Nightly 06/09/24 0756    06/09/24 1255  insulin aspart U-100 pen 0-10 Units         -- SubQ Before meals & nightly PRN 06/09/24 1155        Hold Oral hypoglycemics while patient is in the hospital.  POCT glucose checks   Continue tube feeds

## 2024-07-04 NOTE — ASSESSMENT & PLAN NOTE
We will monitor sodium Daily. The hyponatremia is due to ESRD. Discussed with nephrology, dialysate bath adjusted to account for and correct hyponatremia.   Hyponatremia has been stably low. Now decreased to 126. Ordered urine osm, serum osm, and urine sodium. Also per dialysis NP recs, changed FWF from TID to BID.    Recent Labs   Lab 07/04/24  0909   *

## 2024-07-04 NOTE — ASSESSMENT & PLAN NOTE
Nutrition consulted. Most recent weight and BMI monitored-     Measurements:  Wt Readings from Last 1 Encounters:   07/03/24 105.5 kg (232 lb 9.4 oz)   Body mass index is 36.43 kg/m².    Patient has been screened and assessed by RD.    Malnutrition Type:  Context: acute illness or injury  Level: moderate    Malnutrition Characteristic Summary:  Weight Loss (Malnutrition): 10% in 6 months  Subcutaneous Fat (Malnutrition): mild depletion  Muscle Mass (Malnutrition): mild depletion  Fluid Accumulation (Malnutrition): mild    Interventions/Recommendations (treatment strategy):  - TF  - previous history of failed swallow studies

## 2024-07-04 NOTE — NURSING
.Nurses Note -- 4 Eyes      7/3/2024   9:16 PM      Skin assessed during: Q Shift Change      [] No Altered Skin Integrity Present    []Prevention Measures Documented      [x] Yes- Altered Skin Integrity Present or Discovered   [] LDA Added if Not in Epic (Describe Wound)   [] New Altered Skin Integrity was Present on Admit and Documented in LDA   [] Wound Image Taken    Wound Care Consulted? Yes    Attending Nurse:  Pk Eastman RN/Staff Member:  Brain

## 2024-07-04 NOTE — PROGRESS NOTES
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 History was given from patient's daughter. She was undergoing dialysis today and noticed she was lethargic, less alert than usual self. Pt completed dialysis and still not acting herself. EMS was called, fever of a 100.  On chart review, she did have an episode of large volume emesis around 1700.  Per EMS, she had a slightly elevated temp 100.0°F, glucose 300s.     In the ED: UA 2+ leuks, >100 WBC, many bacteria, WBC 17, CT abd/pelvis concerning for cystitis. Given vanc/zosyn    Overview/Hospital Course:  53 JARROD admitted to Hospital Medicine service for urosepsis. Vanc/zosyn initiated, found to have staph epi in all 4 bottles, vanc/ertapenem course completed per ID. Vascular Neurology consulted concerning mental status change with the recommendation to initiate ASA 81 QD and to obtain an MRI to r/o new stroke. Per discussion with vascular neurology, MRI findings appear to be expected changes from prior stroke. Nephrology was consulted for regularly scheduled dialysis. Pulm consulted, patient decannulated 4/30. Failed swallow assessment, continuing tube feeds. Ongoing placement difficulties d/t need for accepting HD facility to have sandee lift with 2 personnel to operate. Once patient gets accepted at [facility], next step will be to work with daughter on retirement NH placement. SW onboard working on paperwork for long term Medicaid application. H/c c/b new fever, ID reconsulted, CT chest showing bilateral ground glass  opacities, Vanc/meropenem course to be completed 6/17, however patient had further episode of fever and will need continued merem and ID consulted for assistance. CT pan scan to assess for infection. Imaging without signs of infection, patient to complete empiric abx for 5 days. Patient continues fevering despite broad abx, no identified source. Chest x-ray, peripheral smear to assess for other sources. Tunneled catheter removed and replaced via interventional nephrology at recommendation of ID. Catheter tip culture + for proteus. Patient started on ertapenem to complete a 7 day course prior to discharge.     Interval History: NAEO, VSS< remains afebrile, to finish abx course tomorrow. Pending discharge    Review of Systems   Unable to perform ROS: Patient nonverbal     Objective:     Vital Signs (Most Recent):  Temp: 98.7 °F (37.1 °C) (07/04/24 0744)  Pulse: 94 (07/04/24 0744)  Resp: 18 (07/04/24 0744)  BP: 105/73 (07/04/24 0744)  SpO2: 100 % (07/04/24 0744) Vital Signs (24h Range):  Temp:  [97.8 °F (36.6 °C)-98.7 °F (37.1 °C)] 98.7 °F (37.1 °C)  Pulse:  [] 94  Resp:  [14-20] 18  SpO2:  [95 %-100 %] 100 %  BP: ()/(45-82) 105/73     Weight: 105.5 kg (232 lb 9.4 oz)  Body mass index is 36.43 kg/m².    Intake/Output Summary (Last 24 hours) at 7/4/2024 0828  Last data filed at 7/4/2024 0554  Gross per 24 hour   Intake 881.83 ml   Output 1600 ml   Net -718.17 ml         Physical Exam  Constitutional:       Appearance: Normal appearance.   HENT:      Head: Normocephalic and atraumatic.      Right Ear: External ear normal.      Left Ear: External ear normal.      Nose: Nose normal.      Mouth/Throat:      Mouth: Mucous membranes are moist.   Eyes:      Extraocular Movements: Extraocular movements intact.      Pupils: Pupils are equal, round, and reactive to light.   Cardiovascular:      Rate and Rhythm: Normal rate and regular rhythm.      Pulses: Normal pulses.      Heart sounds: Normal heart sounds.    Abdominal:      General: Abdomen is flat.   Musculoskeletal:         General: Normal range of motion.   Skin:     General: Skin is warm.      Capillary Refill: Capillary refill takes less than 2 seconds.   Neurological:      General: No focal deficit present.      Mental Status: She is alert and oriented to person, place, and time.             Significant Labs: All pertinent labs within the past 24 hours have been reviewed.    Significant Imaging: I have reviewed all pertinent imaging results/findings within the past 24 hours.    Assessment/Plan:      * Sepsis  This patient does have evidence of infective focus. My overall impression is sepsis. Source: Skin and Soft Tissue (location Abdominal). Not requiring pressors. Source control achieved by: vanc/meropenem.  Concerns septic source may be PEG site with associated purulent wound despite Wound Care attempts to protect with barriers. PEG placed by General Surgery 2/2024. General Surgery consulted regarding PEG site ordered CT No evidence of infection  US extremities negative for thrombus   CT pan scan w/ IV contrast without evidence of infection  Interventional nephrology consulted for line eval - low suspicion for clot, getting blood cx to r/o infected line  Peripheral smear with findings concerning for infection    Tunneled catheter removed and replaced  Catheter line + for proteus    - Id consulted, continue IV ertapenem 500 mg daily, now on day 5 of 7 day course. Plan to finish course prior to discharge.    Cervical stenosis of spinal canal  Outpatient MRI and NSGY f/up    Irritant contact dermatitis due friction or contact with other specified body fluids  Wound care following     Debility  Needs:  Wheelchair: patient has a mobility limitation that significantly impairs her ability to participate in one or more mobility related activities of daily living (MRADL's) such as toileting, feeding, dressing, grooming, and bathing in customary locations in the home.   The mobility limitation cannot be sufficiently resolved by the use of a cane or walker.   The use of a manual wheelchair will significantly improve the patient's ability to participate in MRADLS and the patient will use it on regular basis in the home.  Family/pt has expressed her willingness to use a manual wheelchair in the home.  She also has a caregiver who is capable of assisting in mobility.    Mrs Saravia requires a hospital bed due to her requiring positioning of the body in ways not feasible with an ordinary bed to alleviate pain/ is completely immobile /or limited mobility and cannot independently make changes in body position without the use of the bed.  The positioning of the body cannot be sufficiently resolved by the use of pillows and wedges    Patient has a mobility limitation that significantly impairs their ability to participate in one or more mobility related activities of daily living, including toileting. This deficit can be resolved by using a bedside commode. Patient demonstrates mobility limitations that will cause them to be confined to one room at home without bathroom access for up to 30 days. Using a bedside commode will greatly improve the patient's ability to participate in MRADLs     Complication of vascular dialysis catheter  Cath intermittently clogging, not resolving with cath-hedy, going for IR venogram 4/30 with possible exchange. Continued concerns about Permacath sluggish flow rate.  S/p exchange with IR on 4/30  S/p 2nd exchange for concern of line infection in setting occult infection    - f/u with nephrology about permacath to make sure no concerns prior to discharge      Constipation  Stool burden on CT    -continue bowel reg  -monitor for BMs    Moderate malnutrition  Nutrition consulted. Most recent weight and BMI monitored-     Measurements:  Wt Readings from Last 1 Encounters:   07/03/24 105.5 kg (232 lb 9.4 oz)   Body mass index is 36.43 kg/m².    Patient has been  "screened and assessed by RD.    Malnutrition Type:  Context: acute illness or injury  Level: moderate    Malnutrition Characteristic Summary:  Weight Loss (Malnutrition): 10% in 6 months  Subcutaneous Fat (Malnutrition): mild depletion  Muscle Mass (Malnutrition): mild depletion  Fluid Accumulation (Malnutrition): mild    Interventions/Recommendations (treatment strategy):  - TF  - previous history of failed swallow studies    Prolonged QT interval  Limit Qtc prolonging drugs as able    Spastic hemiplegia of right dominant side as late effect of cerebrovascular disease  Important that patient is able to sit in chair for 4h for dialysis placement needs, even if she requires lift/assistance getting into the chair.This patient has Chronic right hemiplegia due to stroke. Physical therapy services has been scheduled. Continue all standard measures for pressure injury prevention and consult wound care for any wounds (chronic or acute).    ESRD (end stage renal disease) on dialysis  Creatine stable for now. BMP reviewed- noted Estimated Creatinine Clearance: 13 mL/min (A) (based on SCr of 6.2 mg/dL (H)). according to latest data. Based on current GFR, CKD stage is end stage.  Monitor UOP and serial BMP and adjust therapy as needed. Renally dose meds. Avoid nephrotoxic medications and procedures.    -- HD MWF  -- nephro following  -- Hypophosphatemic on sevelamer  -- SW onboard for placement to [facility] to continue dialysis.    PEG (percutaneous endoscopic gastrostomy) status  On PEG tube.Wound care with PEG dressing changes.   CT abdomen with PEG in correct location    - Tube feeds as toelrated    Leukocytosis  See "Sepsis"    Type 2 diabetes mellitus with hyperglycemia, with long-term current use of insulin  Adjusting insulin to account for diabetic TF. POCG in good range.    Patient's FSGs are controlled on current medication regimen.  Last A1c reviewed-   Lab Results   Component Value Date    HGBA1C 6.2 (H) " 05/27/2024     Most recent fingerstick glucose reviewed-   Recent Labs   Lab 07/03/24  1724 07/03/24  2044 07/04/24  0935 07/04/24  1314   POCTGLUCOSE 133* 114* 102 110       Current correctional scale  Medium  Maintain anti-hyperglycemic dose as follows-   Antihyperglycemics (From admission, onward)      Start     Stop Route Frequency Ordered    06/09/24 2100  insulin glargine U-100 (Lantus) pen 13 Units         -- SubQ Nightly 06/09/24 0756    06/09/24 1255  insulin aspart U-100 pen 0-10 Units         -- SubQ Before meals & nightly PRN 06/09/24 1155          Hold Oral hypoglycemics while patient is in the hospital.  POCT glucose checks   Continue tube feeds    Hyponatremia  We will monitor sodium Daily. The hyponatremia is due to ESRD. Discussed with nephrology, dialysate bath adjusted to account for and correct hyponatremia.   Hyponatremia has been stably low. Now decreased to 126. Ordered urine osm, serum osm, and urine sodium. Also per dialysis NP recs, changed FWF from TID to BID.    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 Pt underwent extensive course, currently still healing from this, but no longer has wound vac. Picture in media tabs    - Wound care while inpatient  - NH with wound care orders      VTE Risk Mitigation (From admission, onward)           Ordered     heparin (porcine) injection 1,000 Units  As needed (PRN)         06/21/24 0828     heparin (porcine) injection 1,000 Units  As needed (PRN)         06/10/24 0035     heparin (porcine) injection 3,000 Units  As needed (PRN)         04/17/24 0825                    Discharge Planning   ZEKE: 7/8/2024     Code Status: Full Code   Is the patient medically ready for discharge?: No    Reason for patient still in hospital (select all that apply): Treatment and Pending disposition  Discharge Plan A: Return to nursing home ([facility])   Discharge  Delays: (!) Payor Issues          Lucien Driver MD  Department of Hospital Medicine   [facility] - Telemetry Stepdown

## 2024-07-05 LAB
ALBUMIN SERPL BCP-MCNC: 3.1 G/DL (ref 3.5–5.2)
ANION GAP SERPL CALC-SCNC: 16 MMOL/L (ref 8–16)
ANION GAP SERPL CALC-SCNC: 17 MMOL/L (ref 8–16)
BUN SERPL-MCNC: 19 MG/DL (ref 6–20)
BUN SERPL-MCNC: 19 MG/DL (ref 6–20)
CALCIUM SERPL-MCNC: 9.6 MG/DL (ref 8.7–10.5)
CALCIUM SERPL-MCNC: 9.8 MG/DL (ref 8.7–10.5)
CHLORIDE SERPL-SCNC: 87 MMOL/L (ref 95–110)
CHLORIDE SERPL-SCNC: 90 MMOL/L (ref 95–110)
CO2 SERPL-SCNC: 21 MMOL/L (ref 23–29)
CO2 SERPL-SCNC: 23 MMOL/L (ref 23–29)
CREAT SERPL-MCNC: 5.3 MG/DL (ref 0.5–1.4)
CREAT SERPL-MCNC: 5.6 MG/DL (ref 0.5–1.4)
EST. GFR  (NO RACE VARIABLE): 8.5 ML/MIN/1.73 M^2
EST. GFR  (NO RACE VARIABLE): 9 ML/MIN/1.73 M^2
GLUCOSE SERPL-MCNC: 101 MG/DL (ref 70–110)
GLUCOSE SERPL-MCNC: 105 MG/DL (ref 70–110)
PHOSPHATE SERPL-MCNC: 4 MG/DL (ref 2.7–4.5)
POCT GLUCOSE: 104 MG/DL (ref 70–110)
POCT GLUCOSE: 110 MG/DL (ref 70–110)
POCT GLUCOSE: 125 MG/DL (ref 70–110)
POCT GLUCOSE: 99 MG/DL (ref 70–110)
POTASSIUM SERPL-SCNC: 4.6 MMOL/L (ref 3.5–5.1)
POTASSIUM SERPL-SCNC: 5.7 MMOL/L (ref 3.5–5.1)
SODIUM SERPL-SCNC: 126 MMOL/L (ref 136–145)
SODIUM SERPL-SCNC: 128 MMOL/L (ref 136–145)

## 2024-07-05 PROCEDURE — 25000003 PHARM REV CODE 250

## 2024-07-05 PROCEDURE — 80100014 HC HEMODIALYSIS 1:1

## 2024-07-05 PROCEDURE — 36415 COLL VENOUS BLD VENIPUNCTURE: CPT | Performed by: NURSE PRACTITIONER

## 2024-07-05 PROCEDURE — 80069 RENAL FUNCTION PANEL: CPT | Performed by: NURSE PRACTITIONER

## 2024-07-05 PROCEDURE — 63600175 PHARM REV CODE 636 W HCPCS: Performed by: NURSE PRACTITIONER

## 2024-07-05 PROCEDURE — 99232 SBSQ HOSP IP/OBS MODERATE 35: CPT | Mod: ,,, | Performed by: NURSE PRACTITIONER

## 2024-07-05 PROCEDURE — 97530 THERAPEUTIC ACTIVITIES: CPT | Mod: CQ

## 2024-07-05 PROCEDURE — 80048 BASIC METABOLIC PNL TOTAL CA: CPT

## 2024-07-05 PROCEDURE — 25000003 PHARM REV CODE 250: Performed by: HOSPITALIST

## 2024-07-05 PROCEDURE — 97535 SELF CARE MNGMENT TRAINING: CPT

## 2024-07-05 PROCEDURE — 27000207 HC ISOLATION

## 2024-07-05 PROCEDURE — 92526 ORAL FUNCTION THERAPY: CPT

## 2024-07-05 PROCEDURE — 97112 NEUROMUSCULAR REEDUCATION: CPT

## 2024-07-05 PROCEDURE — 36415 COLL VENOUS BLD VENIPUNCTURE: CPT

## 2024-07-05 PROCEDURE — 25000003 PHARM REV CODE 250: Performed by: STUDENT IN AN ORGANIZED HEALTH CARE EDUCATION/TRAINING PROGRAM

## 2024-07-05 PROCEDURE — 63600175 PHARM REV CODE 636 W HCPCS: Performed by: HOSPITALIST

## 2024-07-05 PROCEDURE — 20600001 HC STEP DOWN PRIVATE ROOM

## 2024-07-05 RX ADMIN — Medication 500 MG: at 08:07

## 2024-07-05 RX ADMIN — FLUCONAZOLE 100 MG: 40 POWDER, FOR SUSPENSION ORAL at 08:07

## 2024-07-05 RX ADMIN — SODIUM BICARBONATE 650 MG: 650 TABLET ORAL at 08:07

## 2024-07-05 RX ADMIN — ATORVASTATIN CALCIUM 40 MG: 40 TABLET, FILM COATED ORAL at 08:07

## 2024-07-05 RX ADMIN — ERYTHROPOIETIN 6100 UNITS: 10000 INJECTION, SOLUTION INTRAVENOUS; SUBCUTANEOUS at 02:07

## 2024-07-05 RX ADMIN — POLYETHYLENE GLYCOL 3350 17 G: 17 POWDER, FOR SOLUTION ORAL at 08:07

## 2024-07-05 RX ADMIN — PANTOPRAZOLE SODIUM 40 MG: 40 GRANULE, DELAYED RELEASE ORAL at 08:07

## 2024-07-05 RX ADMIN — METOPROLOL TARTRATE 25 MG: 25 TABLET, FILM COATED ORAL at 08:07

## 2024-07-05 RX ADMIN — SODIUM BICARBONATE 650 MG: 650 TABLET ORAL at 02:07

## 2024-07-05 RX ADMIN — POLYETHYLENE GLYCOL 3350 17 G: 17 POWDER, FOR SOLUTION ORAL at 02:07

## 2024-07-05 RX ADMIN — INSULIN GLARGINE 13 UNITS: 100 INJECTION, SOLUTION SUBCUTANEOUS at 08:07

## 2024-07-05 RX ADMIN — ASPIRIN 81 MG CHEWABLE TABLET 81 MG: 81 TABLET CHEWABLE at 08:07

## 2024-07-05 RX ADMIN — HEPARIN SODIUM 1000 UNITS: 1000 INJECTION, SOLUTION INTRAVENOUS; SUBCUTANEOUS at 02:07

## 2024-07-05 RX ADMIN — ERTAPENEM 500 MG: 1 INJECTION INTRAMUSCULAR; INTRAVENOUS at 10:07

## 2024-07-05 NOTE — SUBJECTIVE & OBJECTIVE
Interval History: HD completed this am. Na 128. FWF discontinued.     Review of patient's allergies indicates:  No Known Allergies  Current Facility-Administered Medications   Medication Frequency    acetaminophen oral solution 650 mg Q6H PRN    ascorbic acid (vitamin C) tablet 500 mg BID    aspirin chewable tablet 81 mg Daily    atorvastatin tablet 40 mg Daily    dextrose 10% bolus 125 mL 125 mL PRN    dextrose 10% bolus 250 mL 250 mL PRN    epoetin merry injection 6,100 Units Every Mon, Wed, Fri    ertapenem (INVANZ) 500 mg in 0.9% NaCl 100 mL IVPB Q24H    fluconazole 40 mg/ml suspension 100 mg Daily    glucagon (human recombinant) injection 1 mg PRN    glucose chewable tablet 16 g PRN    glucose chewable tablet 24 g PRN    heparin (porcine) injection 1,000 Units PRN    heparin (porcine) injection 3,000 Units PRN    hepatitis B virus vacc.rec(PF) injection 20 mcg vaccine x 1 dose    insulin aspart U-100 pen 0-10 Units QID (AC + HS) PRN    insulin glargine U-100 (Lantus) pen 13 Units QHS    metoprolol tartrate (LOPRESSOR) tablet 25 mg BID    ondansetron 4 mg/5 mL solution 4 mg Q6H PRN    oxyCODONE 5 mg/5 mL solution 5 mg Q4H PRN    pantoprazole suspension 40 mg Daily    polyethylene glycol packet 17 g TID    sodium bicarbonate tablet 650 mg TID    sodium chloride 0.9% bolus 250 mL 250 mL PRN    sodium chloride 0.9% flush 10 mL Q12H PRN    sodium chloride 0.9% flush 10 mL PRN       Objective:     Vital Signs (Most Recent):  Temp: 97.7 °F (36.5 °C) (07/05/24 0814)  Pulse: 101 (07/05/24 0823)  Resp: 20 (07/05/24 0814)  BP: 126/67 (07/05/24 0823)  SpO2: 96 % (07/05/24 0814) Vital Signs (24h Range):  Temp:  [97.4 °F (36.3 °C)-97.8 °F (36.6 °C)] 97.7 °F (36.5 °C)  Pulse:  [] 101  Resp:  [18-20] 20  SpO2:  [96 %-100 %] 96 %  BP: (104-177)/(62-85) 126/67     Weight: 105.5 kg (232 lb 9.4 oz) (07/03/24 1000)  Body mass index is 36.43 kg/m².  Body surface area is 2.23 meters squared.    I/O last 3 completed shifts:  In:  1761.8 [Other:400; NG/GT:1160; IV Piggyback:201.8]  Out: 1400 [Other:1400]     Physical Exam  Vitals and nursing note reviewed.   Constitutional:       Appearance: Normal appearance.   HENT:      Head: Normocephalic and atraumatic.      Right Ear: External ear normal.      Left Ear: External ear normal.      Nose: Nose normal.      Mouth/Throat:      Mouth: Mucous membranes are moist.   Eyes:      Extraocular Movements: Extraocular movements intact.      Pupils: Pupils are equal, round, and reactive to light.   Cardiovascular:      Rate and Rhythm: Normal rate and regular rhythm.      Pulses: Normal pulses.      Heart sounds: Normal heart sounds.   Abdominal:      General: Abdomen is flat.   Musculoskeletal:         General: Normal range of motion.   Skin:     General: Skin is warm.      Capillary Refill: Capillary refill takes less than 2 seconds.   Neurological:      General: No focal deficit present.      Mental Status: She is alert and oriented to person, place, and time.          Significant Labs:  CBC:   Recent Labs   Lab 07/04/24  0909   WBC 7.30   RBC 4.12   HGB 9.7*   HCT 32.2*      MCV 78*   MCH 23.5*   MCHC 30.1*     CMP:   Recent Labs   Lab 07/04/24  0909 07/05/24  1006   GLU 99 101   CALCIUM 10.0 9.6   ALBUMIN 3.1*  --    PROT 7.9  --    * 128*   K 5.0 5.7*   CO2 16* 21*   CL 93* 90*   BUN 23* 19   CREATININE 6.2* 5.3*   ALKPHOS 86  --    ALT 16  --    AST 26  --    BILITOT 0.3  --      All labs within the past 24 hours have been reviewed.

## 2024-07-05 NOTE — NURSING
.Nurses Note -- 4 Eyes      7/5/2024   2:52 AM      Skin assessed during: Q Shift Change      [] No Altered Skin Integrity Present    []Prevention Measures Documented      [x] Yes- Altered Skin Integrity Present or Discovered   [] LDA Added if Not in Epic (Describe Wound)   [] New Altered Skin Integrity was Present on Admit and Documented in LDA   [] Wound Image Taken    Wound Care Consulted? Yes    Attending Nurse:  Pk Eastman RN/Staff Member: Cindi

## 2024-07-05 NOTE — PT/OT/SLP PROGRESS
Occupational Therapy   Treatment    Name: Sarah Saravia  MRN: [id number]  Admitting Diagnosis:  Sepsis  8 Days Post-Op    Recommendations:     Discharge Recommendations: Moderate Intensity Therapy  Discharge Equipment Recommendations:  hospital bed, lift device, wheelchair  Barriers to discharge:  Other (Comment) (increased skilled assist required)    Assessment:     Sarah Saravia is a 54 y.o. female with a medical diagnosis of Sepsis.  She presents with the following performance deficits affecting function: weakness, impaired endurance, impaired self care skills, impaired functional mobility, impaired balance, decreased upper extremity function, decreased lower extremity function, decreased safety awareness, decreased ROM, impaired coordination. Pt agreeable to session. Pt demonstrating increased participation and motivation for desired occupations this session (donning fresh gown); however, pt requiring frequent redirection to task and increased encouragement from writing OT and daughter to participate in ADL routines, self-care tasks, and upright sitting EOB. Pt would continue to benefit from skilled OT services in the acute care setting to improve functional use of BUEs, increase functional strength and endurance needed for functional tasks, and to promote increased participation in ADL routines and desired occupations.      Rehab Prognosis:  Fair; patient would benefit from acute skilled OT services to address these deficits and reach maximum level of function.       Plan:     Patient to be seen 2 x/week to address the above listed problems via self-care/home management, therapeutic activities, therapeutic exercises, neuromuscular re-education  Plan of Care Expires: 07/03/24  Plan of Care Reviewed with: patient, daughter    Subjective     Chief Complaint: none reported, pt aphasic  Patient/Family Comments/goals: Pt's daughter present for session and motivating pt to participate.   Pain/Comfort:  Pain Rating  1: other (see comments) (no pain indicated throughout session)    Objective:     Communicated with: Nursing prior to session.  Patient found supine with PEG Tube, central line upon OT entry to room.    General Precautions: Standard, aphasia, aspiration, fall, NPO, diabetic    Orthopedic Precautions:N/A  Braces: N/A  Respiratory Status: Room air     Occupational Performance:     Bed Mobility:    Patient completed Scooting/Bridging with maximal assistance  Patient completed Supine to Sit with maximal assistance  Patient completed Sit to Supine with maximal assistance     Functional Mobility/Transfers:  Functional Mobility: Pt able to tolerate sitting EOB for ~15 minutes with max assist provided progressing to CGA at times when pt motivated to participate in task. Pt demonstrating L lean/poor postural control, and requiring mod verbal and physical cueing to promote upright posturing at midline, activate trunk control.     Activities of Daily Living:  Grooming: minimum assistance for washing face with warm wash cloth in sitting EOB, pt declining participation in oral care this session  Upper Body Dressing: minimum assistance for doffing old gown and donning fresh gown      [facility] 6 Click ADL: 9    Treatment & Education:  Provided education on the importance of OOB activities to increase functional endurance and activity tolerance for increased participation in ADL routines. All questions within the scope of OT answered, and pt verbalized understanding.     Patient left supine with all lines intact, call button in reach, and daughter present    GOALS:   Multidisciplinary Problems       Occupational Therapy Goals          Problem: Occupational Therapy    Goal Priority Disciplines Outcome Interventions   Occupational Therapy Goal     OT, PT/OT Progressing    Description: Goals to be met by: 5/22/24 Goals revised as needed on 05-30 to be met by 06-19:Goals revised and extended to 07-03    Patient will increase functional  independence with ADLs by performing:    Revised: UBD with Min A NOT MET  New Goal: UBD seated EOB with Mod A  Revised: Grooming seated EOB with CGA NOT MET  New Goal groom seated EOB with Min A  Revised: Sit EOB x 20 minutes with SBA  MET 06-10-24  Rolling to Bilateral with Moderate Assistance.NOT MET 06-19     Supine to sit with Maximum Assistance. NOT MET 06-19                           Time Tracking:     OT Date of Treatment: 07/05/24  OT Start Time: 1347  OT Stop Time: 1410  OT Total Time (min): 23 min    Billable Minutes:Self Care/Home Management 10 minutes  Neuromuscular Re-education 13 minutes    OT/JOSÉ: OT     Number of JOSÉ visits since last OT visit: 0    7/5/2024

## 2024-07-05 NOTE — PLAN OF CARE
Call placed to [facility] ([phone]) to discuss patient's admission status.. Yadi informed this CM that Ms. Saravia's HD schedule has been changed to T/Th/Sat so they will not be able to accept her until Monday. IM4 Team made aware of above conversation. Will continue to follow.    Sara Loredo RN  Ext [phone]

## 2024-07-05 NOTE — ASSESSMENT & PLAN NOTE
This patient does have evidence of infective focus. My overall impression is sepsis. Source: Skin and Soft Tissue (location Abdominal). Not requiring pressors. Source control achieved by: vanc/meropenem.  Concerns septic source may be PEG site with associated purulent wound despite Wound Care attempts to protect with barriers. PEG placed by General Surgery 2/2024. General Surgery consulted regarding PEG site ordered CT No evidence of infection  US extremities negative for thrombus   CT pan scan w/ IV contrast without evidence of infection  Interventional nephrology consulted for line eval - low suspicion for clot, getting blood cx to r/o infected line  Peripheral smear with findings concerning for infection    Tunneled catheter removed and replaced  Catheter line + for proteus  Completed Ertapenem course per ID

## 2024-07-05 NOTE — PT/OT/SLP PROGRESS
Physical Therapy Treatment    Patient Name:  Sarah Saravia   MRN:  [id number]    Recommendations:     Discharge Recommendations: Moderate Intensity Therapy  Discharge Equipment Recommendations: hospital bed, lift device, wheelchair  Barriers to discharge: Decreased caregiver support and increased skilled assistance needed at this time    Assessment:     Sarah Saravia is a 54 y.o. female admitted with a medical diagnosis of Sepsis.  She presents with the following impairments/functional limitations: weakness, impaired endurance, impaired self care skills, impaired functional mobility, gait instability, impaired balance, impaired cognition, decreased upper extremity function, decreased lower extremity function, decreased safety awareness. Pt tolerated therapy session well and was able to sit at EOB and practice dynamic seated balance exercises w/ mod fatigue. Pt remains motivated to participate in PT treatment sessions.    Rehab Prognosis: Fair; patient would benefit from acute skilled PT services to address these deficits and reach maximum level of function.    Recent Surgery: Procedure(s) (LRB):  Insertion, Catheter, Central Venous, Hemodialysis (Right) 8 Days Post-Op    Plan:     During this hospitalization, patient to be seen 3 x/week to address the identified rehab impairments via gait training, therapeutic activities, therapeutic exercises, neuromuscular re-education and progress toward the following goals:    Plan of Care Expires:  07/22/24    Subjective     Chief Complaint: Fatigue  Pain/Comfort:   Pt in NAD      Objective:     Communicated with nurse (Dave) prior to session.  Patient found  sup w/ HOB elevated  with PEG Tube, no family present upon PT entry to room.     General Precautions: Standard, aphasia, aspiration, fall, NPO  Orthopedic Precautions: N/A  Braces: N/A  Respiratory Status: Room air     Functional Mobility:  Bed Mobility:     Rolling Left:  total A of 2 persons for trunk and  legs  Rolling Right: total A of 2 persons for trunk and legs  Scooting: anteriorly to EOB w/ max A of 2 persons for hips  Supine to Sit: total A of 2 persons for trunk and legs, HOB slightly elevated, exiting on R side of bed, no use of hand rail  Sit to Supine: total A of 2 persons for trunk and legs, HOB flat, no use of hand rail  Balance: static seated balance SBA for safety 10 min; dynamic seated balance (balloon tapping/throwing) 5 min w/ SBA for safety      AM-PAC 6 CLICK MOBILITY  Turning over in bed (including adjusting bedclothes, sheets and blankets)?: 2  Sitting down on and standing up from a chair with arms (e.g., wheelchair, bedside commode, etc.): 1  Moving from lying on back to sitting on the side of the bed?: 2  Moving to and from a bed to a chair (including a wheelchair)?: 1  Need to walk in hospital room?: 1  Climbing 3-5 steps with a railing?: 1  Basic Mobility Total Score: 8       Treatment & Education:  Patient provided with daily orientation and goals of this PT session. Pt was educated on the effects of prolonged immobility and the importance of performing OOB activity and exercises to promote healing and reduce recovery time    Patient left  sup w/ HOB elevated  with all lines intact and call button in reach..    GOALS:   Multidisciplinary Problems       Physical Therapy Goals          Problem: Physical Therapy    Goal Priority Disciplines Outcome Goal Variances Interventions   Physical Therapy Goal     PT, PT/OT Progressing     Description: Goals to be met by: 24   Goals remain appropriate 5/3/2024 to be met by 24  Goals updated 2024 to be met by 24  Goals updated 24 to be met by 24  Goals remain appropriate 24 to be met by 24  Goals remain appropriate to be met by 24    Patient will increase functional independence with mobility by performin. Supine to sit with Maximum Assistance  2. Sit to supine with Maximum Assistance  3. Rolling to  Left and Right with Moderate Assistance.  4. Sitting at edge of bed 5 minutes with Moderate Assistance- MET 04/30, monitor consistency  4a. Sitting at edge of bed 10 minutes with Supervision  5. Lower extremity exercise program x20 reps per handout, with assistance as needed  6. Sit to stand transfer with Maximum Assistance with or without LRAD  7. Stand for 2 minutes with Maximum Assistance with or without LRAD                         Time Tracking:     PT Received On: 07/05/24  PT Start Time: 1005     PT Stop Time: 1032  PT Total Time (min): 27 min     Billable Minutes: Therapeutic Activity 27    Treatment Type: Treatment  PT/PTA: PTA     Number of PTA visits since last PT visit: 5 07/05/2024

## 2024-07-05 NOTE — SUBJECTIVE & OBJECTIVE
Interval History: NAEO, VSS, [facility] unable to take today, as dialysis re-scheduled to T/Th/Sat. Hopeful for Monday discharge. Ertapenem completed today    Review of Systems   Unable to perform ROS: Patient nonverbal     Objective:     Vital Signs (Most Recent):  Temp: 98 °F (36.7 °C) (07/05/24 1135)  Pulse: 91 (07/05/24 1135)  Resp: 20 (07/05/24 1135)  BP: 124/72 (07/05/24 1135)  SpO2: 100 % (07/05/24 1135) Vital Signs (24h Range):  Temp:  [97.6 °F (36.4 °C)-98 °F (36.7 °C)] 98 °F (36.7 °C)  Pulse:  [] 91  Resp:  [18-20] 20  SpO2:  [96 %-100 %] 100 %  BP: (104-177)/(62-85) 124/72     Weight: 105.5 kg (232 lb 9.4 oz)  Body mass index is 36.43 kg/m².    Intake/Output Summary (Last 24 hours) at 7/5/2024 1341  Last data filed at 7/5/2024 0200  Gross per 24 hour   Intake 980 ml   Output 1400 ml   Net -420 ml         Physical Exam  Constitutional:       Appearance: Normal appearance.   HENT:      Head: Normocephalic and atraumatic.      Right Ear: External ear normal.      Left Ear: External ear normal.      Nose: Nose normal.      Mouth/Throat:      Mouth: Mucous membranes are moist.   Eyes:      Extraocular Movements: Extraocular movements intact.      Pupils: Pupils are equal, round, and reactive to light.   Cardiovascular:      Rate and Rhythm: Normal rate and regular rhythm.      Pulses: Normal pulses.      Heart sounds: Normal heart sounds.   Abdominal:      General: Abdomen is flat.   Musculoskeletal:         General: Normal range of motion.   Skin:     General: Skin is warm.      Capillary Refill: Capillary refill takes less than 2 seconds.   Neurological:      General: No focal deficit present.      Mental Status: She is alert and oriented to person, place, and time.             Significant Labs: All pertinent labs within the past 24 hours have been reviewed.    Significant Imaging: I have reviewed all pertinent imaging results/findings within the past 24 hours.

## 2024-07-05 NOTE — PROGRESS NOTES
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 History was given from patient's daughter. She was undergoing dialysis today and noticed she was lethargic, less alert than usual self. Pt completed dialysis and still not acting herself. EMS was called, fever of a 100.  On chart review, she did have an episode of large volume emesis around 1700.  Per EMS, she had a slightly elevated temp 100.0°F, glucose 300s.     In the ED: UA 2+ leuks, >100 WBC, many bacteria, WBC 17, CT abd/pelvis concerning for cystitis. Given vanc/zosyn    Overview/Hospital Course:  53 JARROD admitted to Hospital Medicine service for urosepsis. Vanc/zosyn initiated, found to have staph epi in all 4 bottles, vanc/ertapenem course completed per ID. Vascular Neurology consulted concerning mental status change with the recommendation to initiate ASA 81 QD and to obtain an MRI to r/o new stroke. Per discussion with vascular neurology, MRI findings appear to be expected changes from prior stroke. Nephrology was consulted for regularly scheduled dialysis. Pulm consulted, patient decannulated 4/30. Failed swallow assessment, continuing tube feeds. Ongoing placement difficulties d/t need for accepting HD facility to have sandee lift with 2 personnel to operate. Once patient gets accepted at [facility], next step will be to work with daughter on long-term NH placement. SW onboard working on paperwork for long term Medicaid application. H/c c/b new fever, ID reconsulted, CT chest showing bilateral ground glass  opacities, Vanc/meropenem course to be completed 6/17, however patient had further episode of fever and will need continued merem and ID consulted for assistance. CT pan scan to assess for infection. Imaging without signs of infection, patient to complete empiric abx for 5 days. Patient continues fevering despite broad abx, no identified source. Chest x-ray, peripheral smear to assess for other sources. Tunneled catheter removed and replaced via interventional nephrology at recommendation of ID. Catheter tip culture + for proteus. She completed course of ertapenem 7/5. Medically ready for discharge, pending acceptance from [facility].    Interval History: NAEO, VSS, [facility] unable to take today, as dialysis re-scheduled to T/Th/Sat. Hopeful for Monday discharge. Ertapenem completed today    Review of Systems   Unable to perform ROS: Patient nonverbal     Objective:     Vital Signs (Most Recent):  Temp: 98 °F (36.7 °C) (07/05/24 1135)  Pulse: 91 (07/05/24 1135)  Resp: 20 (07/05/24 1135)  BP: 124/72 (07/05/24 1135)  SpO2: 100 % (07/05/24 1135) Vital Signs (24h Range):  Temp:  [97.6 °F (36.4 °C)-98 °F (36.7 °C)] 98 °F (36.7 °C)  Pulse:  [] 91  Resp:  [18-20] 20  SpO2:  [96 %-100 %] 100 %  BP: (104-177)/(62-85) 124/72     Weight: 105.5 kg (232 lb 9.4 oz)  Body mass index is 36.43 kg/m².    Intake/Output Summary (Last 24 hours) at 7/5/2024 1341  Last data filed at 7/5/2024 0200  Gross per 24 hour   Intake 980 ml   Output 1400 ml   Net -420 ml         Physical Exam  Constitutional:       Appearance: Normal appearance.   HENT:      Head: Normocephalic and atraumatic.      Right Ear: External ear normal.      Left Ear: External ear normal.      Nose: Nose normal.      Mouth/Throat:      Mouth: Mucous membranes are moist.   Eyes:      Extraocular Movements: Extraocular movements intact.      Pupils: Pupils are equal, round, and reactive to light.   Cardiovascular:      Rate and Rhythm: Normal rate and regular rhythm.       Pulses: Normal pulses.      Heart sounds: Normal heart sounds.   Abdominal:      General: Abdomen is flat.   Musculoskeletal:         General: Normal range of motion.   Skin:     General: Skin is warm.      Capillary Refill: Capillary refill takes less than 2 seconds.   Neurological:      General: No focal deficit present.      Mental Status: She is alert and oriented to person, place, and time.             Significant Labs: All pertinent labs within the past 24 hours have been reviewed.    Significant Imaging: I have reviewed all pertinent imaging results/findings within the past 24 hours.    Assessment/Plan:      * Sepsis  This patient does have evidence of infective focus. My overall impression is sepsis. Source: Skin and Soft Tissue (location Abdominal). Not requiring pressors. Source control achieved by: vanc/meropenem.  Concerns septic source may be PEG site with associated purulent wound despite Wound Care attempts to protect with barriers. PEG placed by General Surgery 2/2024. General Surgery consulted regarding PEG site ordered CT No evidence of infection  US extremities negative for thrombus   CT pan scan w/ IV contrast without evidence of infection  Interventional nephrology consulted for line eval - low suspicion for clot, getting blood cx to r/o infected line  Peripheral smear with findings concerning for infection    Tunneled catheter removed and replaced  Catheter line + for proteus  Completed Ertapenem course per ID    Intertrigo        Cervical stenosis of spinal canal  Outpatient MRI and NSGY f/up    Irritant contact dermatitis due friction or contact with other specified body fluids  Wound care following     Debility  Needs:  Wheelchair: patient has a mobility limitation that significantly impairs her ability to participate in one or more mobility related activities of daily living (MRADL's) such as toileting, feeding, dressing, grooming, and bathing in customary locations in the home.  The  mobility limitation cannot be sufficiently resolved by the use of a cane or walker.   The use of a manual wheelchair will significantly improve the patient's ability to participate in MRADLS and the patient will use it on regular basis in the home.  Family/pt has expressed her willingness to use a manual wheelchair in the home.  She also has a caregiver who is capable of assisting in mobility.    Mrs Saravia requires a hospital bed due to her requiring positioning of the body in ways not feasible with an ordinary bed to alleviate pain/ is completely immobile /or limited mobility and cannot independently make changes in body position without the use of the bed.  The positioning of the body cannot be sufficiently resolved by the use of pillows and wedges    Patient has a mobility limitation that significantly impairs their ability to participate in one or more mobility related activities of daily living, including toileting. This deficit can be resolved by using a bedside commode. Patient demonstrates mobility limitations that will cause them to be confined to one room at home without bathroom access for up to 30 days. Using a bedside commode will greatly improve the patient's ability to participate in MRADLs     Complication of vascular dialysis catheter  Cath intermittently clogging, not resolving with cath-hedy, going for IR venogram 4/30 with possible exchange. Continued concerns about Permacath sluggish flow rate.  S/p exchange with IR on 4/30  S/p 2nd exchange for concern of line infection in setting occult infection    - f/u with nephrology about permacath to make sure no concerns prior to discharge      Constipation  Stool burden on CT    -continue bowel reg  -monitor for BMs    Moderate malnutrition  Nutrition consulted. Most recent weight and BMI monitored-     Measurements:  Wt Readings from Last 1 Encounters:   07/03/24 105.5 kg (232 lb 9.4 oz)   Body mass index is 36.43 kg/m².    Patient has been screened  "and assessed by RD.    Malnutrition Type:  Context: acute illness or injury  Level: moderate    Malnutrition Characteristic Summary:  Weight Loss (Malnutrition): 10% in 6 months  Subcutaneous Fat (Malnutrition): mild depletion  Muscle Mass (Malnutrition): mild depletion  Fluid Accumulation (Malnutrition): mild    Interventions/Recommendations (treatment strategy):  - TF  - previous history of failed swallow studies    Prolonged QT interval  Limit Qtc prolonging drugs as able    Spastic hemiplegia of right dominant side as late effect of cerebrovascular disease  Important that patient is able to sit in chair for 4h for dialysis placement needs, even if she requires lift/assistance getting into the chair.This patient has Chronic right hemiplegia due to stroke. Physical therapy services has been scheduled. Continue all standard measures for pressure injury prevention and consult wound care for any wounds (chronic or acute).    ESRD (end stage renal disease) on dialysis  Creatine stable for now. BMP reviewed- noted Estimated Creatinine Clearance: 13 mL/min (A) (based on SCr of 6.2 mg/dL (H)). according to latest data. Based on current GFR, CKD stage is end stage.  Monitor UOP and serial BMP and adjust therapy as needed. Renally dose meds. Avoid nephrotoxic medications and procedures.    -- HD MWF  -- nephro following  -- Hypophosphatemic on sevelamer  -- SW onboard for placement to [facility] to continue dialysis.    PEG (percutaneous endoscopic gastrostomy) status  On PEG tube.Wound care with PEG dressing changes.   CT abdomen with PEG in correct location    - Tube feeds as toelrated    Leukocytosis  See "Sepsis"    Type 2 diabetes mellitus with hyperglycemia, with long-term current use of insulin  Adjusting insulin to account for diabetic TF. POCG in good range.    Patient's FSGs are controlled on current medication regimen.  Last A1c reviewed-   Lab Results   Component Value Date    HGBA1C 6.2 (H) 05/27/2024 "     Most recent fingerstick glucose reviewed-   Recent Labs   Lab 07/03/24  1724 07/03/24  2044 07/04/24  0935 07/04/24  1314   POCTGLUCOSE 133* 114* 102 110       Current correctional scale  Medium  Maintain anti-hyperglycemic dose as follows-   Antihyperglycemics (From admission, onward)      Start     Stop Route Frequency Ordered    06/09/24 2100  insulin glargine U-100 (Lantus) pen 13 Units         -- SubQ Nightly 06/09/24 0756    06/09/24 1255  insulin aspart U-100 pen 0-10 Units         -- SubQ Before meals & nightly PRN 06/09/24 1155          Hold Oral hypoglycemics while patient is in the hospital.  POCT glucose checks   Continue tube feeds    Hyponatremia  We will monitor sodium Daily. The hyponatremia is due to ESRD. Discussed with nephrology, dialysate bath adjusted to account for and correct hyponatremia.   Hyponatremia has been stably low. Now decreased to 126. Ordered urine osm, serum osm, and urine sodium. Also per dialysis NP recs, changed FWF from TID to BID.    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 Pt underwent extensive course, currently still healing from this, but no longer has wound vac. Picture in media tabs    - Wound care while inpatient  - NH with wound care orders      VTE Risk Mitigation (From admission, onward)           Ordered     heparin (porcine) injection 1,000 Units  As needed (PRN)         06/21/24 0828     heparin (porcine) injection 1,000 Units  As needed (PRN)         06/10/24 0035     heparin (porcine) injection 3,000 Units  As needed (PRN)         04/17/24 0825                    Discharge Planning   ZEKE: 7/8/2024     Code Status: Full Code   Is the patient medically ready for discharge?: No    Reason for patient still in hospital (select all that apply): Pending disposition  Discharge Plan A: Return to nursing home ([facility])   Discharge Delays: (!) Payor  Issues          Lucien Driver MD  Department of Hospital Medicine   [facility] - Telemetry Stepdown

## 2024-07-05 NOTE — PROGRESS NOTES
07/04/24 2228   During Hemodialysis Assessment   Blood Flow Rate (mL/min) 300 mL/min   Dialysate Flow Rate (mL/min) 700 ml/min   Ultrafiltration Rate (mL/Hr) 530 mL/Hr   Arteriovenous Lines Secure Yes   Arterial Pressure (mmHg) -230 mmHg   Venous Pressure (mmHg) 110   Blood Volume Processed (Liters) 0 L   UF Removed (mL) 0 mL   TMP 50   Venous Line in Air Detector Yes   Intake (mL) 200 mL   Transducer Dry Yes   Access Visible Yes    notified of access issue? N/A   Heparin given? N/A   Intra-Hemodialysis Comments HD initiated     Report received from primary RN. HD started per MD order.

## 2024-07-05 NOTE — PT/OT/SLP PROGRESS
Speech Language Pathology Treatment    Patient Name:  Sarah Saravia   MRN:  [id number]  Admitting Diagnosis: Sepsis    Recommendations:                 General Recommendations:  Dysphagia therapy and Speech/language therapy  Diet recommendations:  NPO, Liquid Diet Level: NPO Pt may receive thin liquids for comfort/pleasure - watch for holding.  Aspiration Precautions: Continue alternate means of nutrition, Frequent oral care, and Strict aspiration precautions   General Precautions: Standard, aphasia, aspiration, fall, NPO  Communication strategies:  yes/no questions only and go to room if call light pushed    Assessment:     Sarah Saravia is a 54 y.o. female with an SLP diagnosis of Aphasia, Dysphagia, and Cognitive-Linguistic Impairment.      Subjective     Pt was nonverbal/nonvocal, but did communicate via head nod/shake several times.    Pain/Comfort:  Pain Rating 1: 0/10    Respiratory Status: Room air    Objective:     Has the patient been evaluated by SLP for swallowing?   Yes  Keep patient NPO? Yes   Current Respiratory Status:        Pt seen for ongoing swallowing assessment/treatment. Pt agreeable to receiving straw sips of juice and 1/2 tsp bites of pudding x 2.  Delayed cough present after initial sip.  Pt also exhibited prolonged holding (45 seconds+) and piecemeal deglutition with pureed trials.  Pt indicated by reaching for cup of juice that she did not wish to receive any further trials of  pudding at this time. Education was provided to pt regarding ongoing recommendations for thin liquids only, trials of purees with SLP only, risks of aspiration associated with holding PO, and SLP treatment plan and POC. Pt nodded in response. White board updated. Pt left with call button within reach.     Goals:   Multidisciplinary Problems       SLP Goals          Problem: SLP    Goal Priority Disciplines Outcome   SLP Goal     SLP Progressing   Description: Speech Pathology Goals  To be met by 7/19/24    1. Pt  will participate in ongoing diagnostic dysphagia therapy    2. Pt will answer basic Y/N questions with 50% accuracy given MAX multi-modality cueing  3. Pt will follow single step commands paired with model across 50% of trials   4. Pt will complete automatic speech tasks with 50% accuracy given MAX multi-modality cueing         Speech Language Pathology Goals  Updated goals expected to be met by 5/10 (goals remain appropriate 6/11 - reassess on 6/18:  1. Pt will participate in ongoing swallowing assessment to determine if appropriate for PO trials for pleasure.   2. Pt will model single step command x 1 given max cues.   3. Pt will answer simple yes/no q's during a structured receptive language task x 1 given max cues.   4. Pt will phonate purposefully upon command x 1 given max cues.   5. Pt will attempt to vocalize/verbalize during automatic speech task x 1 given max cues.   6. Pt will participate in evaluation of ability to utilize basic communication board.     Goals expected to be met by 5/8:  1. Pt will participate in Modified Barium Swallow Study to determine if safe for oral intake. Goal met/attempted 5/2                               Plan:     Patient to be seen:  3 x/week   Plan of Care expires:  05/31/24  Plan of Care reviewed with:  patient   SLP Follow-Up:  Yes       Discharge recommendations:  Moderate Intensity Therapy     Time Tracking:     SLP Treatment Date:   07/05/24  Speech Start Time:  1224  Speech Stop Time:  1238     Speech Total Time (min):  14 min    Billable Minutes: Treatment Swallowing Dysfunction 6 and Self Care/Home Management Training 8    07/05/2024

## 2024-07-05 NOTE — PROGRESS NOTES
07/05/24 0200   Post-Hemodialysis Assessment   Rinseback Volume (mL) 200 mL   Blood Volume Processed (Liters) 48.3 L   Dialyzer Clearance Lightly streaked   Duration of Treatment 180 minutes   Additional Fluid Intake (mL) 400 mL   Total UF (mL) 1400 mL   Net Fluid Removal 1000   Patient Response to Treatment refugio well   Post-Hemodialysis Comments stable     HD completed per MD order. Report given to primary RN.

## 2024-07-05 NOTE — NURSING
Report received from Babs.  Patient remains free from fall and injury. NAD noted, VSS. Bed locked and in low position, Safety maintained. Call light in reach, white board updated and explained, no c/o pain n/v, diarrhea or sob, will cont with POC    Nurses Note -- 4 Eyes      7/5/2024   9:27 AM      Skin assessed during: Daily Assessment      [] No Altered Skin Integrity Present    []Prevention Measures Documented      [x] Yes- Altered Skin Integrity Present or Discovered   [] LDA Added if Not in Epic (Describe Wound)   [] New Altered Skin Integrity was Present on Admit and Documented in LDA   [] Wound Image Taken    Wound Care Consulted? No    Attending Nurse:  Champ Eastman RN/Staff Member:  Babs

## 2024-07-05 NOTE — PROGRESS NOTES
[facility] - Telemetry Stepdown  Nephrology  Progress Note    Patient Name: Sarah Saravia  MRN: [id number]  Admission Date: 4/10/2024  Hospital Length of Stay: 86 days  Attending Provider: Violetta Odonnell*   Primary Care Physician: No, Primary Doctor  Principal Problem:Sepsis    Subjective:     Interval History: HD completed this am. Na 128. FWF discontinued.     Review of patient's allergies indicates:  No Known Allergies  Current Facility-Administered Medications   Medication Frequency    acetaminophen oral solution 650 mg Q6H PRN    ascorbic acid (vitamin C) tablet 500 mg BID    aspirin chewable tablet 81 mg Daily    atorvastatin tablet 40 mg Daily    dextrose 10% bolus 125 mL 125 mL PRN    dextrose 10% bolus 250 mL 250 mL PRN    epoetin merry injection 6,100 Units Every Mon, Wed, Fri    ertapenem (INVANZ) 500 mg in 0.9% NaCl 100 mL IVPB Q24H    fluconazole 40 mg/ml suspension 100 mg Daily    glucagon (human recombinant) injection 1 mg PRN    glucose chewable tablet 16 g PRN    glucose chewable tablet 24 g PRN    heparin (porcine) injection 1,000 Units PRN    heparin (porcine) injection 3,000 Units PRN    hepatitis B virus vacc.rec(PF) injection 20 mcg vaccine x 1 dose    insulin aspart U-100 pen 0-10 Units QID (AC + HS) PRN    insulin glargine U-100 (Lantus) pen 13 Units QHS    metoprolol tartrate (LOPRESSOR) tablet 25 mg BID    ondansetron 4 mg/5 mL solution 4 mg Q6H PRN    oxyCODONE 5 mg/5 mL solution 5 mg Q4H PRN    pantoprazole suspension 40 mg Daily    polyethylene glycol packet 17 g TID    sodium bicarbonate tablet 650 mg TID    sodium chloride 0.9% bolus 250 mL 250 mL PRN    sodium chloride 0.9% flush 10 mL Q12H PRN    sodium chloride 0.9% flush 10 mL PRN       Objective:     Vital Signs (Most Recent):  Temp: 97.7 °F (36.5 °C) (07/05/24 0814)  Pulse: 101 (07/05/24 0823)  Resp: 20 (07/05/24 0814)  BP: 126/67 (07/05/24 0823)  SpO2: 96 % (07/05/24 0814) Vital Signs (24h Range):  Temp:  [97.4 °F (36.3  °C)-97.8 °F (36.6 °C)] 97.7 °F (36.5 °C)  Pulse:  [] 101  Resp:  [18-20] 20  SpO2:  [96 %-100 %] 96 %  BP: (104-177)/(62-85) 126/67     Weight: 105.5 kg (232 lb 9.4 oz) (07/03/24 1000)  Body mass index is 36.43 kg/m².  Body surface area is 2.23 meters squared.    I/O last 3 completed shifts:  In: 1761.8 [Other:400; NG/GT:1160; IV Piggyback:201.8]  Out: 1400 [Other:1400]     Physical Exam  Vitals and nursing note reviewed.   Constitutional:       Appearance: Normal appearance.   HENT:      Head: Normocephalic and atraumatic.      Right Ear: External ear normal.      Left Ear: External ear normal.      Nose: Nose normal.      Mouth/Throat:      Mouth: Mucous membranes are moist.   Eyes:      Extraocular Movements: Extraocular movements intact.      Pupils: Pupils are equal, round, and reactive to light.   Cardiovascular:      Rate and Rhythm: Normal rate and regular rhythm.      Pulses: Normal pulses.      Heart sounds: Normal heart sounds.   Abdominal:      General: Abdomen is flat.   Musculoskeletal:         General: Normal range of motion.   Skin:     General: Skin is warm.      Capillary Refill: Capillary refill takes less than 2 seconds.   Neurological:      General: No focal deficit present.      Mental Status: She is alert and oriented to person, place, and time.          Significant Labs:  CBC:   Recent Labs   Lab 07/04/24  0909   WBC 7.30   RBC 4.12   HGB 9.7*   HCT 32.2*      MCV 78*   MCH 23.5*   MCHC 30.1*     CMP:   Recent Labs   Lab 07/04/24  0909 07/05/24  1006   GLU 99 101   CALCIUM 10.0 9.6   ALBUMIN 3.1*  --    PROT 7.9  --    * 128*   K 5.0 5.7*   CO2 16* 21*   CL 93* 90*   BUN 23* 19   CREATININE 6.2* 5.3*   ALKPHOS 86  --    ALT 16  --    AST 26  --    BILITOT 0.3  --      All labs within the past 24 hours have been reviewed.       Assessment/Plan:     Renal/  ESRD (end stage renal disease) on dialysis  54 y.o. Black or  Female ESRD-HD M-W-F at [facility] presents to  ED on 4/10/2024 with UTI.    Of note, the patient was initiated on RRT in February 2024 after being admitted with ALVARADO 2/2 iATN due to septic shock. Perm cath placed on 2/29/24. She was transferred to [facility] on 3/12. Deemed ESRD at LT.  Nephrology consulted for inpatient ESRD-HD management    Assessment:     -  K 5.7, following HD. ? Hemolysis. Recheck BMP this evening. Pending labs in am will plan to transition to TTS schedule while IP  -Shift for K >6 and notify nephrology on call   - Hyponatremia in setting of ESRD - Recommend continue to hold FWF   - Continue renal TF  - Continue to monitor intake and output  - Please avoid gadolinium, fleets, phos-based laxatives, NSAIDs  - Dialysis thrice weekly unless more urgent indications arise. Will evaluate RRT requirements Daily.      Lab Results   Component Value Date    FESATURATED 10 (L) 03/15/2024    FERRITIN 414 (H) 03/15/2024       - Goal in ESRD is Hgb of 10-11. Continue EPO.     Endocrine  Hyponatremia  Hyponatremia in setting of ESRD - recommend continue to hold FWF         Thank you for your consult. I will follow-up with patient. Please contact us if you have any additional questions.    Delfina Shi, POONAM  Nephrology  [facility] - Telemetry Stepdown

## 2024-07-05 NOTE — PLAN OF CARE
[facility] - Telemetry Stepdown  Discharge Reassessment    Primary Care Provider: No, Primary Doctor    Expected Discharge Date: 7/8/2024    Reassessment (most recent)       Discharge Reassessment - 07/05/24 1522          Discharge Reassessment    Assessment Type Discharge Planning Reassessment     Did the patient's condition or plan change since previous assessment? No     Discharge Plan discussed with: Adult children     Communicated ZEKE with patient/caregiver Yes     Discharge Plan A New Nursing Home placement - halfway care facility     Discharge Plan B New Nursing Home placement - halfway care facility     DME Needed Upon Discharge  none     Why the patient remains in the hospital Requires continued medical care        Post-Acute Status    Post-Acute Authorization Placement     Post-Acute Placement Status Pending medical clearance/testing     Diaylsis Status Set-up Complete/Auth obtained   [facility]                  Discharge Plan A and Plan B have been determined by review of patient's clinical status, future medical and therapeutic needs, and coverage/benefits for post-acute care in coordination with multidisciplinary team members.     Sara Loredo RN  Ext [phone]

## 2024-07-05 NOTE — ASSESSMENT & PLAN NOTE
54 y.o. Black or  Female ESRD-HD M-W-F at [facility] presents to ED on 4/10/2024 with UTI.    Of note, the patient was initiated on RRT in February 2024 after being admitted with ALVARADO 2/2 iATN due to septic shock. Perm cath placed on 2/29/24. She was transferred to [facility] on 3/12. Deemed ESRD at [facility].  Nephrology consulted for inpatient ESRD-HD management    Assessment:     -  K 5.7, following HD. ? Hemolysis. Recheck BMP this evening. Pending labs in am will plan to transition to TTS schedule while IP  - Hyponatremia in setting of ESRD - Recommend continue to hold FWF   - Continue renal TF  - Continue to monitor intake and output  - Please avoid gadolinium, fleets, phos-based laxatives, NSAIDs  - Dialysis thrice weekly unless more urgent indications arise. Will evaluate RRT requirements Daily.      Lab Results   Component Value Date    FESATURATED 10 (L) 03/15/2024    FERRITIN 414 (H) 03/15/2024       - Goal in ESRD is Hgb of 10-11. Continue EPO.

## 2024-07-06 LAB
ALBUMIN SERPL BCP-MCNC: 2.9 G/DL (ref 3.5–5.2)
ALP SERPL-CCNC: 89 U/L (ref 55–135)
ALT SERPL W/O P-5'-P-CCNC: 15 U/L (ref 10–44)
ANION GAP SERPL CALC-SCNC: 15 MMOL/L (ref 8–16)
AST SERPL-CCNC: 23 U/L (ref 10–40)
BASOPHILS # BLD AUTO: 0.04 K/UL (ref 0–0.2)
BASOPHILS NFR BLD: 0.7 % (ref 0–1.9)
BILIRUB SERPL-MCNC: 0.3 MG/DL (ref 0.1–1)
BUN SERPL-MCNC: 22 MG/DL (ref 6–20)
CALCIUM SERPL-MCNC: 9.7 MG/DL (ref 8.7–10.5)
CHLORIDE SERPL-SCNC: 86 MMOL/L (ref 95–110)
CO2 SERPL-SCNC: 24 MMOL/L (ref 23–29)
CREAT SERPL-MCNC: 5.8 MG/DL (ref 0.5–1.4)
DIFFERENTIAL METHOD BLD: ABNORMAL
EOSINOPHIL # BLD AUTO: 0.4 K/UL (ref 0–0.5)
EOSINOPHIL NFR BLD: 6.9 % (ref 0–8)
ERYTHROCYTE [DISTWIDTH] IN BLOOD BY AUTOMATED COUNT: 16.6 % (ref 11.5–14.5)
EST. GFR  (NO RACE VARIABLE): 8.1 ML/MIN/1.73 M^2
GLUCOSE SERPL-MCNC: 97 MG/DL (ref 70–110)
HCT VFR BLD AUTO: 31.1 % (ref 37–48.5)
HGB BLD-MCNC: 9.3 G/DL (ref 12–16)
IMM GRANULOCYTES # BLD AUTO: 0.04 K/UL (ref 0–0.04)
IMM GRANULOCYTES NFR BLD AUTO: 0.7 % (ref 0–0.5)
LYMPHOCYTES # BLD AUTO: 1.7 K/UL (ref 1–4.8)
LYMPHOCYTES NFR BLD: 28.1 % (ref 18–48)
MAGNESIUM SERPL-MCNC: 1.8 MG/DL (ref 1.6–2.6)
MCH RBC QN AUTO: 23.7 PG (ref 27–31)
MCHC RBC AUTO-ENTMCNC: 29.9 G/DL (ref 32–36)
MCV RBC AUTO: 79 FL (ref 82–98)
MONOCYTES # BLD AUTO: 1.1 K/UL (ref 0.3–1)
MONOCYTES NFR BLD: 18.4 % (ref 4–15)
NEUTROPHILS # BLD AUTO: 2.7 K/UL (ref 1.8–7.7)
NEUTROPHILS NFR BLD: 45.2 % (ref 38–73)
NRBC BLD-RTO: 0 /100 WBC
PHOSPHATE SERPL-MCNC: 4 MG/DL (ref 2.7–4.5)
PLATELET # BLD AUTO: 240 K/UL (ref 150–450)
PMV BLD AUTO: 10.5 FL (ref 9.2–12.9)
POCT GLUCOSE: 106 MG/DL (ref 70–110)
POCT GLUCOSE: 115 MG/DL (ref 70–110)
POCT GLUCOSE: 118 MG/DL (ref 70–110)
POCT GLUCOSE: 119 MG/DL (ref 70–110)
POTASSIUM SERPL-SCNC: 3.8 MMOL/L (ref 3.5–5.1)
PROT SERPL-MCNC: 7.3 G/DL (ref 6–8.4)
RBC # BLD AUTO: 3.92 M/UL (ref 4–5.4)
SODIUM SERPL-SCNC: 125 MMOL/L (ref 136–145)
WBC # BLD AUTO: 5.98 K/UL (ref 3.9–12.7)

## 2024-07-06 PROCEDURE — 25000003 PHARM REV CODE 250

## 2024-07-06 PROCEDURE — 83735 ASSAY OF MAGNESIUM: CPT

## 2024-07-06 PROCEDURE — 85025 COMPLETE CBC W/AUTO DIFF WBC: CPT

## 2024-07-06 PROCEDURE — 20600001 HC STEP DOWN PRIVATE ROOM

## 2024-07-06 PROCEDURE — 27000207 HC ISOLATION

## 2024-07-06 PROCEDURE — 63600175 PHARM REV CODE 636 W HCPCS: Performed by: NURSE PRACTITIONER

## 2024-07-06 PROCEDURE — 36415 COLL VENOUS BLD VENIPUNCTURE: CPT

## 2024-07-06 PROCEDURE — 80053 COMPREHEN METABOLIC PANEL: CPT

## 2024-07-06 PROCEDURE — 80100014 HC HEMODIALYSIS 1:1

## 2024-07-06 PROCEDURE — 84100 ASSAY OF PHOSPHORUS: CPT

## 2024-07-06 PROCEDURE — 25000003 PHARM REV CODE 250: Performed by: STUDENT IN AN ORGANIZED HEALTH CARE EDUCATION/TRAINING PROGRAM

## 2024-07-06 RX ORDER — SODIUM CHLORIDE 9 MG/ML
INJECTION, SOLUTION INTRAVENOUS ONCE
Status: CANCELLED | OUTPATIENT
Start: 2024-07-06 | End: 2024-07-06

## 2024-07-06 RX ADMIN — POLYETHYLENE GLYCOL 3350 17 G: 17 POWDER, FOR SOLUTION ORAL at 03:07

## 2024-07-06 RX ADMIN — ATORVASTATIN CALCIUM 40 MG: 40 TABLET, FILM COATED ORAL at 08:07

## 2024-07-06 RX ADMIN — ASPIRIN 81 MG CHEWABLE TABLET 81 MG: 81 TABLET CHEWABLE at 08:07

## 2024-07-06 RX ADMIN — SODIUM BICARBONATE 650 MG: 650 TABLET ORAL at 08:07

## 2024-07-06 RX ADMIN — METOPROLOL TARTRATE 25 MG: 25 TABLET, FILM COATED ORAL at 09:07

## 2024-07-06 RX ADMIN — Medication 500 MG: at 08:07

## 2024-07-06 RX ADMIN — POLYETHYLENE GLYCOL 3350 17 G: 17 POWDER, FOR SOLUTION ORAL at 08:07

## 2024-07-06 RX ADMIN — Medication 500 MG: at 09:07

## 2024-07-06 RX ADMIN — SODIUM BICARBONATE 650 MG: 650 TABLET ORAL at 09:07

## 2024-07-06 RX ADMIN — INSULIN GLARGINE 13 UNITS: 100 INJECTION, SOLUTION SUBCUTANEOUS at 09:07

## 2024-07-06 RX ADMIN — HEPARIN SODIUM 3000 UNITS: 1000 INJECTION, SOLUTION INTRAVENOUS; SUBCUTANEOUS at 10:07

## 2024-07-06 RX ADMIN — SODIUM BICARBONATE 650 MG: 650 TABLET ORAL at 03:07

## 2024-07-06 RX ADMIN — FLUCONAZOLE 100 MG: 40 POWDER, FOR SUSPENSION ORAL at 09:07

## 2024-07-06 RX ADMIN — POLYETHYLENE GLYCOL 3350 17 G: 17 POWDER, FOR SOLUTION ORAL at 09:07

## 2024-07-06 RX ADMIN — PANTOPRAZOLE SODIUM 40 MG: 40 GRANULE, DELAYED RELEASE ORAL at 08:07

## 2024-07-06 RX ADMIN — METOPROLOL TARTRATE 25 MG: 25 TABLET, FILM COATED ORAL at 08:07

## 2024-07-06 NOTE — SUBJECTIVE & OBJECTIVE
Interval History:   Seen on HD this morning, tolerating well.  Na 125 this morning, asymptomatic.  OP HD schedule has been adjusted to TTS.      Review of patient's allergies indicates:  No Known Allergies  Current Facility-Administered Medications   Medication Frequency    acetaminophen oral solution 650 mg Q6H PRN    ascorbic acid (vitamin C) tablet 500 mg BID    aspirin chewable tablet 81 mg Daily    atorvastatin tablet 40 mg Daily    dextrose 10% bolus 125 mL 125 mL PRN    dextrose 10% bolus 250 mL 250 mL PRN    epoetin merry injection 6,100 Units Every Mon, Wed, Fri    fluconazole 40 mg/ml suspension 100 mg Daily    glucagon (human recombinant) injection 1 mg PRN    glucose chewable tablet 16 g PRN    glucose chewable tablet 24 g PRN    heparin (porcine) injection 1,000 Units PRN    heparin (porcine) injection 3,000 Units PRN    hepatitis B virus vacc.rec(PF) injection 20 mcg vaccine x 1 dose    insulin aspart U-100 pen 0-10 Units QID (AC + HS) PRN    insulin glargine U-100 (Lantus) pen 13 Units QHS    metoprolol tartrate (LOPRESSOR) tablet 25 mg BID    ondansetron 4 mg/5 mL solution 4 mg Q6H PRN    oxyCODONE 5 mg/5 mL solution 5 mg Q4H PRN    pantoprazole suspension 40 mg Daily    polyethylene glycol packet 17 g TID    sodium bicarbonate tablet 650 mg TID    sodium chloride 0.9% bolus 250 mL 250 mL PRN    sodium chloride 0.9% flush 10 mL Q12H PRN    sodium chloride 0.9% flush 10 mL PRN       Objective:     Vital Signs (Most Recent):  Temp: 97.6 °F (36.4 °C) (07/06/24 0740)  Pulse: 85 (07/06/24 1100)  Resp: 17 (07/06/24 0740)  BP: 101/66 (07/06/24 1100)  SpO2: 100 % (07/06/24 0740) Vital Signs (24h Range):  Temp:  [97.6 °F (36.4 °C)-98.6 °F (37 °C)] 97.6 °F (36.4 °C)  Pulse:  [] 85  Resp:  [17-20] 17  SpO2:  [99 %-100 %] 100 %  BP: (101-140)/(63-87) 101/66     Weight: 105.5 kg (232 lb 9.4 oz) (07/03/24 1000)  Body mass index is 36.43 kg/m².  Body surface area is 2.23 meters squared.    I/O last 3 completed  shifts:  In: 680 [Other:400; NG/GT:180; IV Piggyback:100]  Out: 1400 [Other:1400]     Physical Exam  Vitals and nursing note reviewed.   Constitutional:       Appearance: Normal appearance.   HENT:      Head: Normocephalic and atraumatic.      Right Ear: External ear normal.      Left Ear: External ear normal.      Nose: Nose normal.      Mouth/Throat:      Mouth: Mucous membranes are moist.   Eyes:      Extraocular Movements: Extraocular movements intact.      Pupils: Pupils are equal, round, and reactive to light.   Cardiovascular:      Rate and Rhythm: Normal rate and regular rhythm.      Pulses: Normal pulses.      Heart sounds: Normal heart sounds.   Abdominal:      General: Abdomen is flat.   Musculoskeletal:         General: Normal range of motion.   Skin:     General: Skin is warm.   Neurological:      General: No focal deficit present.      Mental Status: She is alert and oriented to person, place, and time.          Significant Labs:  CBC:   Recent Labs   Lab 07/06/24  0649   WBC 5.98   RBC 3.92*   HGB 9.3*   HCT 31.1*      MCV 79*   MCH 23.7*   MCHC 29.9*     CMP:   Recent Labs   Lab 07/06/24  0649   GLU 97   CALCIUM 9.7   ALBUMIN 2.9*   PROT 7.3   *   K 3.8   CO2 24   CL 86*   BUN 22*   CREATININE 5.8*   ALKPHOS 89   ALT 15   AST 23   BILITOT 0.3

## 2024-07-06 NOTE — ASSESSMENT & PLAN NOTE
54 y.o. Black or  Female ESRD-HD M-W-F at [facility] presents to ED on 4/10/2024 with UTI.    Of note, the patient was initiated on RRT in February 2024 after being admitted with ALVARADO 2/2 iATN due to septic shock. Perm cath placed on 2/29/24. She was transferred to [facility] on 3/12. Deemed ESRD at [facility].  Nephrology consulted for inpatient ESRD-HD management    Assessment:   -HD today to place on TTS schedule for discharge planning  - Na 125, hypervolemic.  Discontinue FWF.  Na bath with HD  - Continue renal TF  - Continue to monitor intake and output  - Please avoid gadolinium, fleets, phos-based laxatives, NSAIDs  - Dialysis thrice weekly unless more urgent indications arise. Will evaluate RRT requirements Daily.      Lab Results   Component Value Date    FESATURATED 10 (L) 03/15/2024    FERRITIN 414 (H) 03/15/2024       - Goal in ESRD is Hgb of 10-11. Continue EPO.

## 2024-07-06 NOTE — PLAN OF CARE
Problem: Infection  Goal: Absence of Infection Signs and Symptoms  Outcome: Progressing     Problem: Skin Injury Risk Increased  Goal: Skin Health and Integrity  Outcome: Progressing     Problem: Adult Inpatient Plan of Care  Goal: Plan of Care Review  Outcome: Progressing     Problem: Chronic Kidney Disease  Goal: Optimal Coping with Chronic Illness  Outcome: Progressing  Goal: Electrolyte Balance  Outcome: Progressing  Goal: Fluid Balance  Outcome: Progressing     Problem: Hemodialysis  Goal: Safe, Effective Therapy Delivery  Outcome: Progressing  Goal: Effective Tissue Perfusion  Outcome: Progressing  Goal: Absence of Infection Signs and Symptoms  Outcome: Progressing

## 2024-07-06 NOTE — PROGRESS NOTES
HD Tx completed.   Net fluid removed 1 liter.  VSS. Tolerated HDD.  Report given to primary nurse.

## 2024-07-06 NOTE — PLAN OF CARE
Problem: Infection  Goal: Absence of Infection Signs and Symptoms  7/6/2024 0728 by Champ Grossman RN  Outcome: Progressing  7/6/2024 0728 by Champ Grossman RN  Outcome: Progressing     Problem: Skin Injury Risk Increased  Goal: Skin Health and Integrity  7/6/2024 0728 by Champ Grossman RN  Outcome: Progressing  7/6/2024 0728 by Champ Grossman RN  Outcome: Progressing     Problem: Adult Inpatient Plan of Care  Goal: Plan of Care Review  7/6/2024 0728 by Champ Grossman RN  Outcome: Progressing  7/6/2024 0728 by Champ Grossman RN  Outcome: Progressing     Problem: Chronic Kidney Disease  Goal: Optimal Coping with Chronic Illness  7/6/2024 0728 by Champ Grossman RN  Outcome: Progressing  7/6/2024 0728 by Champ Grossman RN  Outcome: Progressing  Goal: Electrolyte Balance  7/6/2024 0728 by Champ Grossman RN  Outcome: Progressing  7/6/2024 0728 by Champ Grossman RN  Outcome: Progressing  Goal: Fluid Balance  7/6/2024 0728 by Champ Grossman RN  Outcome: Progressing  7/6/2024 0728 by Champ Grossman RN  Outcome: Progressing  Goal: Optimal Functional Ability  7/6/2024 0728 by Champ Grossman RN  Outcome: Progressing  7/6/2024 0728 by Champ Grossman RN  Outcome: Progressing  Goal: Absence of Anemia Signs and Symptoms  7/6/2024 0728 by Champ Grossman RN  Outcome: Progressing  7/6/2024 0728 by Champ Grossman RN  Outcome: Progressing  Goal: Optimal Oral Intake  7/6/2024 0728 by Champ Grossman RN  Outcome: Progressing  7/6/2024 0728 by Champ Grossman RN  Outcome: Progressing  Goal: Acceptable Pain Control  7/6/2024 0728 by Champ Grossman RN  Outcome: Progressing  7/6/2024 0728 by Champ Grossman RN  Outcome: Progressing  Goal: Minimize Renal Failure Effects  7/6/2024 0728 by Champ Grossman RN  Outcome: Progressing  7/6/2024 0728 by Champ Grossman RN  Outcome: Progressing     Problem: Hemodialysis  Goal: Safe, Effective Therapy Delivery  7/6/2024 0728 by Champ Grossman RN  Outcome: Progressing  7/6/2024 0728 by Champ Grossman,  RN  Outcome: Progressing  Goal: Effective Tissue Perfusion  7/6/2024 0728 by Champ Grossman RN  Outcome: Progressing  7/6/2024 0728 by Champ Grossman RN  Outcome: Progressing  Goal: Absence of Infection Signs and Symptoms  7/6/2024 0728 by Champ Grossman RN  Outcome: Progressing  7/6/2024 0728 by Champ Grossman RN  Outcome: Progressing

## 2024-07-06 NOTE — NURSING
Patient pulled IV out, attempted to start a new one x's 2 but was unsuccessful, Rapid team called to see f they could come start one. Was told to give them an few minutes and they would be up here to come try.

## 2024-07-06 NOTE — ASSESSMENT & PLAN NOTE
Cath intermittently clogging, not resolving with cath-hedy, going for IR venogram 4/30 with possible exchange. Continued concerns about Permacath sluggish flow rate.  S/p exchange with IR on 4/30  S/p 2nd exchange for concern of line infection in setting occult infection.   Followed up with nephrology about permacath, working well.     - Dialysis today, will be on TTS schedule per Nephrology

## 2024-07-06 NOTE — ASSESSMENT & PLAN NOTE
Creatine stable for now. BMP reviewed- noted Estimated Creatinine Clearance: 13.9 mL/min (A) (based on SCr of 5.8 mg/dL (H)). according to latest data. Based on current GFR, CKD stage is end stage.  Monitor UOP and serial BMP and adjust therapy as needed. Renally dose meds. Avoid nephrotoxic medications and procedures.    -- HD MWF changed to TTS schedule per nephrology  -- nephro following  -- Hypophosphatemic on sevelamer  -- SW onboard for placement to [facility] to continue dialysis.

## 2024-07-06 NOTE — NURSING
Report received from Nan.  Patient remains free from fall and injury. NAD noted, VSS,   Bed locked and in low position, Safety maintained. Call light in reach, white board updated and explained, no c/o pain n/v, diarrhea or sob, will cont with POC    Nurses Note -- 4 Eyes      7/6/2024   7:28 AM      Skin assessed during: Daily Assessment      [] No Altered Skin Integrity Present    []Prevention Measures Documented      [x] Yes- Altered Skin Integrity Present or Discovered   [] LDA Added if Not in Epic (Describe Wound)   [] New Altered Skin Integrity was Present on Admit and Documented in LDA   [] Wound Image Taken    Wound Care Consulted? No    Attending Nurse:  Champ Eastman RN/Staff Member:  Nan

## 2024-07-06 NOTE — PROGRESS NOTES
[facility] - Telemetry Stepdown  Nephrology  Progress Note    Patient Name: Sarah Saravia  MRN: [id number]  Admission Date: 4/10/2024  Hospital Length of Stay: 87 days  Attending Provider: Violetta Odonnell*   Primary Care Physician: No, Primary Doctor  Principal Problem:Sepsis    Subjective:     HPI: The patient is a 53 y.o. 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 The patient is unable to provide adequate history. Additional history and patient information was obtained from patient's daughter, past medical records and ER records. Per chart, she was undergoing dialysis Wednesday and was noted to be more lethargic than usual despite completing her HD session. EMS was called, fever of a 100. In the ED, UA 2+ leuks, >100 WBC, many bacteria, WBC 17, CT abd/pelvis concerning for cystitis. Given vanc/zosyn and admitted.     Of note, the patient was initiated on RRT in February 2024 after being admitted with ALVARADO 2/2 iATN due to septic shock. Perm cath placed on 2/29/24. She was transferred to [facility] on 3/12. Interventional Nephrology was consulted for concerns of potential tunneled dialysis line infection. She developed fever on 6/22 with a T-max 38.1C and leukocytosis sporadically throughout her hospital stay with the most recent episode of leukocytosis being on 6/22 where it reached a peak of 18.94. She received meropenum through 6/22 and last dose of vancomycin was on 6/21. Cultures thus far are positive for Proteus Mirabilis ESBL from urine cultures on 4/11, Staph Epidermis in 2/2 vials on 4/10, pseudomonas in groin abscess on 3/8. All cultures taken this hospital stay have been negative.       Interval History:   Seen on HD this morning, tolerating well.  Na 125 this morning, asymptomatic.  OP HD schedule has been adjusted to TTS.      Review  of patient's allergies indicates:  No Known Allergies  Current Facility-Administered Medications   Medication Frequency    acetaminophen oral solution 650 mg Q6H PRN    ascorbic acid (vitamin C) tablet 500 mg BID    aspirin chewable tablet 81 mg Daily    atorvastatin tablet 40 mg Daily    dextrose 10% bolus 125 mL 125 mL PRN    dextrose 10% bolus 250 mL 250 mL PRN    epoetin merry injection 6,100 Units Every Mon, Wed, Fri    fluconazole 40 mg/ml suspension 100 mg Daily    glucagon (human recombinant) injection 1 mg PRN    glucose chewable tablet 16 g PRN    glucose chewable tablet 24 g PRN    heparin (porcine) injection 1,000 Units PRN    heparin (porcine) injection 3,000 Units PRN    hepatitis B virus vacc.rec(PF) injection 20 mcg vaccine x 1 dose    insulin aspart U-100 pen 0-10 Units QID (AC + HS) PRN    insulin glargine U-100 (Lantus) pen 13 Units QHS    metoprolol tartrate (LOPRESSOR) tablet 25 mg BID    ondansetron 4 mg/5 mL solution 4 mg Q6H PRN    oxyCODONE 5 mg/5 mL solution 5 mg Q4H PRN    pantoprazole suspension 40 mg Daily    polyethylene glycol packet 17 g TID    sodium bicarbonate tablet 650 mg TID    sodium chloride 0.9% bolus 250 mL 250 mL PRN    sodium chloride 0.9% flush 10 mL Q12H PRN    sodium chloride 0.9% flush 10 mL PRN       Objective:     Vital Signs (Most Recent):  Temp: 97.6 °F (36.4 °C) (07/06/24 0740)  Pulse: 85 (07/06/24 1100)  Resp: 17 (07/06/24 0740)  BP: 101/66 (07/06/24 1100)  SpO2: 100 % (07/06/24 0740) Vital Signs (24h Range):  Temp:  [97.6 °F (36.4 °C)-98.6 °F (37 °C)] 97.6 °F (36.4 °C)  Pulse:  [] 85  Resp:  [17-20] 17  SpO2:  [99 %-100 %] 100 %  BP: (101-140)/(63-87) 101/66     Weight: 105.5 kg (232 lb 9.4 oz) (07/03/24 1000)  Body mass index is 36.43 kg/m².  Body surface area is 2.23 meters squared.    I/O last 3 completed shifts:  In: 680 [Other:400; NG/GT:180; IV Piggyback:100]  Out: 1400 [Other:1400]     Physical Exam  Vitals and nursing note reviewed.    Constitutional:       Appearance: Normal appearance.   HENT:      Head: Normocephalic and atraumatic.      Right Ear: External ear normal.      Left Ear: External ear normal.      Nose: Nose normal.      Mouth/Throat:      Mouth: Mucous membranes are moist.   Eyes:      Extraocular Movements: Extraocular movements intact.      Pupils: Pupils are equal, round, and reactive to light.   Cardiovascular:      Rate and Rhythm: Normal rate and regular rhythm.      Pulses: Normal pulses.      Heart sounds: Normal heart sounds.   Abdominal:      General: Abdomen is flat.   Musculoskeletal:         General: Normal range of motion.   Skin:     General: Skin is warm.   Neurological:      General: No focal deficit present.      Mental Status: She is alert and oriented to person, place, and time.          Significant Labs:  CBC:   Recent Labs   Lab 07/06/24  0649   WBC 5.98   RBC 3.92*   HGB 9.3*   HCT 31.1*      MCV 79*   MCH 23.7*   MCHC 29.9*     CMP:   Recent Labs   Lab 07/06/24  0649   GLU 97   CALCIUM 9.7   ALBUMIN 2.9*   PROT 7.3   *   K 3.8   CO2 24   CL 86*   BUN 22*   CREATININE 5.8*   ALKPHOS 89   ALT 15   AST 23   BILITOT 0.3        Assessment/Plan:     Renal/  ESRD (end stage renal disease) on dialysis  54 y.o. Black or  Female ESRD-HD M-W-F at [facility] presents to ED on 4/10/2024 with UTI.    Of note, the patient was initiated on RRT in February 2024 after being admitted with ALVARADO 2/2 iATN due to septic shock. Perm cath placed on 2/29/24. She was transferred to [facility] on 3/12. Deemed ESRD at [facility].  Nephrology consulted for inpatient ESRD-HD management    Assessment:   -HD today to place on TTS schedule for discharge planning  - Na 125, hypervolemic.  Discontinue FWF.  Na bath with HD  - Continue renal TF  - Continue to monitor intake and output  - Please avoid gadolinium, fleets, phos-based laxatives, NSAIDs  - Dialysis thrice weekly unless more urgent indications arise. Will evaluate RRT  requirements Daily.      Lab Results   Component Value Date    FESATURATED 10 (L) 03/15/2024    FERRITIN 414 (H) 03/15/2024       - Goal in ESRD is Hgb of 10-11. Continue EPO.             Endocrine  Hyponatremia  Hypervolemic hyponatremia  -2/2 ESRD status  -hold FWF  -Na bath with HD      Axel Elizabeth, NP  Nephrology  [facility] - Telemetry Stepdown

## 2024-07-06 NOTE — PROGRESS NOTES
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 History was given from patient's daughter. She was undergoing dialysis today and noticed she was lethargic, less alert than usual self. Pt completed dialysis and still not acting herself. EMS was called, fever of a 100.  On chart review, she did have an episode of large volume emesis around 1700.  Per EMS, she had a slightly elevated temp 100.0°F, glucose 300s.     In the ED: UA 2+ leuks, >100 WBC, many bacteria, WBC 17, CT abd/pelvis concerning for cystitis. Given vanc/zosyn    Overview/Hospital Course:  53 JARROD admitted to Hospital Medicine service for urosepsis. Vanc/zosyn initiated, found to have staph epi in all 4 bottles, vanc/ertapenem course completed per ID. Vascular Neurology consulted concerning mental status change with the recommendation to initiate ASA 81 QD and to obtain an MRI to r/o new stroke. Per discussion with vascular neurology, MRI findings appear to be expected changes from prior stroke. Nephrology was consulted for regularly scheduled dialysis. Pulm consulted, patient decannulated 4/30. Failed swallow assessment, continuing tube feeds. Ongoing placement difficulties d/t need for accepting HD facility to have sandee lift with 2 personnel to operate. Once patient gets accepted at [facility], next step will be to work with daughter on senior living NH placement. SW onboard working on paperwork for long term Medicaid application. H/c c/b new fever, ID reconsulted, CT chest showing bilateral ground glass  opacities, Vanc/meropenem course to be completed 6/17, however patient had further episode of fever and will need continued merem and ID consulted for assistance. CT pan scan to assess for infection. Imaging without signs of infection, patient to complete empiric abx for 5 days. Patient continues fevering despite broad abx, no identified source. Chest x-ray, peripheral smear to assess for other sources. Tunneled catheter removed and replaced via interventional nephrology at recommendation of ID. Catheter tip culture + for proteus. She completed course of ertapenem 7/5. Medically ready for discharge, pending acceptance from [facility].    Interval History: VSS. NASIDRAON. Pt is having dialysis today. Medically stable with plans to discharge 7/8.    Objective:     Vital Signs (Most Recent):  Temp: 97.6 °F (36.4 °C) (07/06/24 0740)  Pulse: 92 (07/06/24 1300)  Resp: 17 (07/06/24 0740)  BP: (!) 87/65 (07/06/24 1300)  SpO2: 100 % (07/06/24 0740) Vital Signs (24h Range):  Temp:  [97.6 °F (36.4 °C)-98.6 °F (37 °C)] 97.6 °F (36.4 °C)  Pulse:  [] 92  Resp:  [17-20] 17  SpO2:  [99 %-100 %] 100 %  BP: ()/(54-87) 87/65     Weight: 105.5 kg (232 lb 9.4 oz)  Body mass index is 36.43 kg/m².    Intake/Output Summary (Last 24 hours) at 7/6/2024 1344  Last data filed at 7/5/2024 1850  Gross per 24 hour   Intake 180 ml   Output --   Net 180 ml         Physical Exam  Constitutional:       Appearance: Normal appearance.   HENT:      Head: Normocephalic and atraumatic.      Right Ear: External ear normal.      Left Ear: External ear normal.      Nose: Nose normal.      Mouth/Throat:      Mouth: Mucous membranes are moist.   Eyes:      Extraocular Movements: Extraocular movements intact.      Pupils: Pupils are equal, round, and reactive to light.   Cardiovascular:      Rate and Rhythm: Normal rate and regular rhythm.      Pulses: Normal pulses.      Heart sounds: Normal heart sounds.   Abdominal:      General: Abdomen is flat.    Musculoskeletal:         General: Normal range of motion.   Skin:     General: Skin is warm.      Capillary Refill: Capillary refill takes less than 2 seconds.   Neurological:      General: No focal deficit present.      Mental Status: She is alert and oriented to person, place, and time.             Significant Labs: All pertinent labs within the past 24 hours have been reviewed.    Significant Imaging: I have reviewed all pertinent imaging results/findings within the past 24 hours.    Assessment/Plan:      * Sepsis  This patient does have evidence of infective focus. My overall impression is sepsis. Source: Skin and Soft Tissue (location Abdominal). Not requiring pressors. Source control achieved by: vanc/meropenem.  Concerns septic source may be PEG site with associated purulent wound despite Wound Care attempts to protect with barriers. PEG placed by General Surgery 2/2024. General Surgery consulted regarding PEG site ordered CT No evidence of infection  US extremities negative for thrombus   CT pan scan w/ IV contrast without evidence of infection  Interventional nephrology consulted for line eval - low suspicion for clot, getting blood cx to r/o infected line  Peripheral smear with findings concerning for infection    Tunneled catheter removed and replaced  Catheter line + for proteus  Completed Ertapenem course per ID    Hyponatremia  We will monitor sodium Daily. The hyponatremia is due to ESRD. Discussed with nephrology, dialysate bath adjusted to account for and correct hyponatremia.   Hyponatremia has been stably low. Ordered urine osm, serum osm, and urine sodium ordered. Pt not making urine. Serum osm 275.   Recent Labs   Lab 07/06/24  0649   *     Per Nephrology, Na 125, hypervolemic.  Discontinued FWF.  Na bath with HD        ESRD (end stage renal disease) on dialysis  Creatine stable for now. BMP reviewed- noted Estimated Creatinine Clearance: 13.9 mL/min (A) (based on SCr of 5.8 mg/dL (H)).  according to latest data. Based on current GFR, CKD stage is end stage.  Monitor UOP and serial BMP and adjust therapy as needed. Renally dose meds. Avoid nephrotoxic medications and procedures.    -- HD MWF changed to TTS schedule per nephrology  -- nephro following  -- Hypophosphatemic on sevelamer  -- [facility].    Complication of vascular dialysis catheter  Cath intermittently clogging, not resolving with cath-hedy, going for IR venogram 4/30 with possible exchange. Continued concerns about Permacath sluggish flow rate.  S/p exchange with IR on 4/30  S/p 2nd exchange for concern of line infection in setting occult infection.   Followed up with nephrology about permacath, working well.     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 Pt underwent extensive course, currently still healing from this, but no longer has wound vac. Picture in media tabs    - Wound care while inpatient  - NH with wound care orders    Debility  Needs:  Wheelchair: patient has a mobility limitation that significantly impairs her ability to participate in one or more mobility related activities of daily living (MRADL's) such as toileting, feeding, dressing, grooming, and bathing in customary locations in the home.  The mobility limitation cannot be sufficiently resolved by the use of a cane or walker.   The use of a manual wheelchair will significantly improve the patient's ability to participate in MRADLS and the patient will use it on regular basis in the home.  Family/pt has expressed her willingness to use a manual wheelchair in the home.  She also has a caregiver who is capable of assisting in mobility.    Mrs Saravia requires a hospital bed due to her requiring positioning of the body in ways not feasible with an ordinary bed to alleviate pain/ is completely immobile /or  limited mobility and cannot independently make changes in body position without the use of the bed.  The positioning of the body cannot be sufficiently resolved by the use of pillows and wedges    Patient has a mobility limitation that significantly impairs their ability to participate in one or more mobility related activities of daily living, including toileting. This deficit can be resolved by using a bedside commode. Patient demonstrates mobility limitations that will cause them to be confined to one room at home without bathroom access for up to 30 days. Using a bedside commode will greatly improve the patient's ability to participate in MRADLs     Constipation  Stool burden on CT    -continue bowel reg  -monitor for BMs    Cervical stenosis of spinal canal  Outpatient MRI and NSGY f/up    Intertrigo        Irritant contact dermatitis due friction or contact with other specified body fluids  Wound care following     Moderate malnutrition  Nutrition consulted. Most recent weight and BMI monitored-     Measurements:  Wt Readings from Last 1 Encounters:   07/03/24 105.5 kg (232 lb 9.4 oz)   Body mass index is 36.43 kg/m².    Patient has been screened and assessed by RD.    Malnutrition Type:  Context: acute illness or injury  Level: moderate    Malnutrition Characteristic Summary:  Weight Loss (Malnutrition): 10% in 6 months  Subcutaneous Fat (Malnutrition): mild depletion  Muscle Mass (Malnutrition): mild depletion  Fluid Accumulation (Malnutrition): mild    Interventions/Recommendations (treatment strategy):  - TF  - previous history of failed swallow studies    Prolonged QT interval  Limit Qtc prolonging drugs as able    Spastic hemiplegia of right dominant side as late effect of cerebrovascular disease  Important that patient is able to sit in chair for 4h for dialysis placement needs, even if she requires lift/assistance getting into the chair.This patient has Chronic right hemiplegia due to stroke. Physical  "therapy services has been scheduled. Continue all standard measures for pressure injury prevention and consult wound care for any wounds (chronic or acute).    PEG (percutaneous endoscopic gastrostomy) status  On PEG tube.Wound care with PEG dressing changes.   CT abdomen with PEG in correct location    - Tube feeds as toelrated    Leukocytosis  See "Sepsis"    Type 2 diabetes mellitus with hyperglycemia, with long-term current use of insulin  Adjusting insulin to account for diabetic TF. POCG in good range.    Patient's FSGs are controlled on current medication regimen.  Last A1c reviewed-   Lab Results   Component Value Date    HGBA1C 6.2 (H) 05/27/2024     Most recent fingerstick glucose reviewed-   Recent Labs   Lab 07/03/24  1724 07/03/24  2044 07/04/24  0935 07/04/24  1314   POCTGLUCOSE 133* 114* 102 110       Current correctional scale  Medium  Maintain anti-hyperglycemic dose as follows-   Antihyperglycemics (From admission, onward)      Start     Stop Route Frequency Ordered    06/09/24 2100  insulin glargine U-100 (Lantus) pen 13 Units         -- SubQ Nightly 06/09/24 0756    06/09/24 1255  insulin aspart U-100 pen 0-10 Units         -- SubQ Before meals & nightly PRN 06/09/24 1155          Hold Oral hypoglycemics while patient is in the hospital.  POCT glucose checks   Continue tube feeds      VTE Risk Mitigation (From admission, onward)           Ordered     heparin (porcine) injection 1,000 Units  As needed (PRN)         06/21/24 0828     heparin (porcine) injection 1,000 Units  As needed (PRN)         06/10/24 0035     heparin (porcine) injection 3,000 Units  As needed (PRN)         04/17/24 0825                    Discharge Planning   ZEKE: 7/8/2024     Code Status: Full Code   Is the patient medically ready for discharge?: No    Reason for patient still in hospital (select all that apply): Pending disposition  Discharge Plan A: New Nursing Home placement - senior living care facility   Discharge Delays: " (!) Payor Issues              Samra Leavitt MD  Department of Hospital Medicine   [facility] - Telemetry Stepdown

## 2024-07-06 NOTE — PROGRESS NOTES
Arrived by bed.  Awake, alert and responsive.  VSS.  HD TX started via RIJ cvc.  Isolation precautions maintained.

## 2024-07-06 NOTE — SUBJECTIVE & OBJECTIVE
Interval History: VSS. RENZO. Pt is having dialysis today. Medically stable with plans to discharge 7/8.    Objective:     Vital Signs (Most Recent):  Temp: 97.6 °F (36.4 °C) (07/06/24 0740)  Pulse: 92 (07/06/24 1300)  Resp: 17 (07/06/24 0740)  BP: (!) 87/65 (07/06/24 1300)  SpO2: 100 % (07/06/24 0740) Vital Signs (24h Range):  Temp:  [97.6 °F (36.4 °C)-98.6 °F (37 °C)] 97.6 °F (36.4 °C)  Pulse:  [] 92  Resp:  [17-20] 17  SpO2:  [99 %-100 %] 100 %  BP: ()/(54-87) 87/65     Weight: 105.5 kg (232 lb 9.4 oz)  Body mass index is 36.43 kg/m².    Intake/Output Summary (Last 24 hours) at 7/6/2024 1344  Last data filed at 7/5/2024 1850  Gross per 24 hour   Intake 180 ml   Output --   Net 180 ml         Physical Exam  Constitutional:       Appearance: Normal appearance.   HENT:      Head: Normocephalic and atraumatic.      Right Ear: External ear normal.      Left Ear: External ear normal.      Nose: Nose normal.      Mouth/Throat:      Mouth: Mucous membranes are moist.   Eyes:      Extraocular Movements: Extraocular movements intact.      Pupils: Pupils are equal, round, and reactive to light.   Cardiovascular:      Rate and Rhythm: Normal rate and regular rhythm.      Pulses: Normal pulses.      Heart sounds: Normal heart sounds.   Abdominal:      General: Abdomen is flat.   Musculoskeletal:         General: Normal range of motion.   Skin:     General: Skin is warm.      Capillary Refill: Capillary refill takes less than 2 seconds.   Neurological:      General: No focal deficit present.      Mental Status: She is alert and oriented to person, place, and time.             Significant Labs: All pertinent labs within the past 24 hours have been reviewed.    Significant Imaging: I have reviewed all pertinent imaging results/findings within the past 24 hours.

## 2024-07-06 NOTE — ASSESSMENT & PLAN NOTE
We will monitor sodium Daily. The hyponatremia is due to ESRD. Discussed with nephrology, dialysate bath adjusted to account for and correct hyponatremia.   Hyponatremia has been stably low. Ordered urine osm, serum osm, and urine sodium ordered. Pt not making urine. Serum osm 275.   Recent Labs   Lab 07/06/24  0649   *     Per Nephrology, Na 125, hypervolemic.  Discontinued FWF.  Na bath with HD

## 2024-07-06 NOTE — NURSING
Nurses Note -- 4 Eyes      7/5/2024   7:25 PM      Skin assessed during: Q Shift Change      [] No Altered Skin Integrity Present    []Prevention Measures Documented      [x] Yes- Altered Skin Integrity Present or Discovered   [] LDA Added if Not in Epic (Describe Wound)   [] New Altered Skin Integrity was Present on Admit and Documented in LDA   [] Wound Image Taken    Wound Care Consulted? Yes    Attending Nurse:  Nan Eastman RN/Staff Member:  Carolee

## 2024-07-07 LAB
POCT GLUCOSE: 125 MG/DL (ref 70–110)
POCT GLUCOSE: 129 MG/DL (ref 70–110)
POCT GLUCOSE: 131 MG/DL (ref 70–110)
POCT GLUCOSE: 134 MG/DL (ref 70–110)

## 2024-07-07 PROCEDURE — 27000207 HC ISOLATION

## 2024-07-07 PROCEDURE — 25000003 PHARM REV CODE 250

## 2024-07-07 PROCEDURE — 25000003 PHARM REV CODE 250: Performed by: STUDENT IN AN ORGANIZED HEALTH CARE EDUCATION/TRAINING PROGRAM

## 2024-07-07 PROCEDURE — 20600001 HC STEP DOWN PRIVATE ROOM

## 2024-07-07 RX ORDER — PANTOPRAZOLE SODIUM 40 MG/1
40 FOR SUSPENSION ORAL DAILY
Start: 2024-07-07 | End: 2025-07-07

## 2024-07-07 RX ORDER — SODIUM BICARBONATE 650 MG/1
650 TABLET ORAL 3 TIMES DAILY
Start: 2024-07-07 | End: 2025-07-07

## 2024-07-07 RX ADMIN — ATORVASTATIN CALCIUM 40 MG: 40 TABLET, FILM COATED ORAL at 08:07

## 2024-07-07 RX ADMIN — POLYETHYLENE GLYCOL 3350 17 G: 17 POWDER, FOR SOLUTION ORAL at 03:07

## 2024-07-07 RX ADMIN — Medication 500 MG: at 08:07

## 2024-07-07 RX ADMIN — SODIUM BICARBONATE 650 MG: 650 TABLET ORAL at 03:07

## 2024-07-07 RX ADMIN — INSULIN GLARGINE 13 UNITS: 100 INJECTION, SOLUTION SUBCUTANEOUS at 09:07

## 2024-07-07 RX ADMIN — SODIUM BICARBONATE 650 MG: 650 TABLET ORAL at 09:07

## 2024-07-07 RX ADMIN — METOPROLOL TARTRATE 25 MG: 25 TABLET, FILM COATED ORAL at 09:07

## 2024-07-07 RX ADMIN — SODIUM BICARBONATE 650 MG: 650 TABLET ORAL at 08:07

## 2024-07-07 RX ADMIN — Medication 500 MG: at 09:07

## 2024-07-07 RX ADMIN — POLYETHYLENE GLYCOL 3350 17 G: 17 POWDER, FOR SOLUTION ORAL at 09:07

## 2024-07-07 RX ADMIN — METOPROLOL TARTRATE 25 MG: 25 TABLET, FILM COATED ORAL at 08:07

## 2024-07-07 RX ADMIN — PANTOPRAZOLE SODIUM 40 MG: 40 GRANULE, DELAYED RELEASE ORAL at 08:07

## 2024-07-07 RX ADMIN — POLYETHYLENE GLYCOL 3350 17 G: 17 POWDER, FOR SOLUTION ORAL at 08:07

## 2024-07-07 RX ADMIN — ASPIRIN 81 MG CHEWABLE TABLET 81 MG: 81 TABLET CHEWABLE at 08:07

## 2024-07-07 NOTE — PROGRESS NOTES
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 History was given from patient's daughter. She was undergoing dialysis today and noticed she was lethargic, less alert than usual self. Pt completed dialysis and still not acting herself. EMS was called, fever of a 100.  On chart review, she did have an episode of large volume emesis around 1700.  Per EMS, she had a slightly elevated temp 100.0°F, glucose 300s.     In the ED: UA 2+ leuks, >100 WBC, many bacteria, WBC 17, CT abd/pelvis concerning for cystitis. Given vanc/zosyn    Overview/Hospital Course:  53 JARROD admitted to Hospital Medicine service for urosepsis. Vanc/zosyn initiated, found to have staph epi in all 4 bottles, vanc/ertapenem course completed per ID. Vascular Neurology consulted concerning mental status change with the recommendation to initiate ASA 81 QD and to obtain an MRI to r/o new stroke. Per discussion with vascular neurology, MRI findings appear to be expected changes from prior stroke. Nephrology was consulted for regularly scheduled dialysis. Pulm consulted, patient decannulated 4/30. Failed swallow assessment, continuing tube feeds. Ongoing placement difficulties d/t need for accepting HD facility to have sandee lift with 2 personnel to operate. Once patient gets accepted at [facility], next step will be to work with daughter on half-way NH placement. SW onboard working on paperwork for long term Medicaid application. H/c c/b new fever, ID reconsulted, CT chest showing bilateral ground glass  opacities, Vanc/meropenem course to be completed 6/17, however patient had further episode of fever and will need continued merem and ID consulted for assistance. CT pan scan to assess for infection. Imaging without signs of infection, patient to complete empiric abx for 5 days. Patient continues fevering despite broad abx, no identified source. Chest x-ray, peripheral smear to assess for other sources. Tunneled catheter removed and replaced via interventional nephrology at recommendation of ID. Catheter tip culture + for proteus. She completed course of ertapenem 7/5. Medically ready for discharge, pending acceptance from [facility].    Interval History: PT pulled out IV overnight. VSS. No labs today. Got dialysis yesterday, no complications. Plan to discharge 7/8 to [facility].     Review of Systems   Unable to perform ROS: Patient nonverbal     Objective:     Vital Signs (Most Recent):  Temp: 98.3 °F (36.8 °C) (07/07/24 1131)  Pulse: 88 (07/07/24 1131)  Resp: 18 (07/07/24 1131)  BP: 104/64 (07/07/24 1131)  SpO2: 95 % (07/07/24 1131) Vital Signs (24h Range):  Temp:  [98 °F (36.7 °C)-98.7 °F (37.1 °C)] 98.3 °F (36.8 °C)  Pulse:  [] 88  Resp:  [18-19] 18  SpO2:  [95 %-100 %] 95 %  BP: (104-133)/(58-82) 104/64     Weight: 105.5 kg (232 lb 9.4 oz)  Body mass index is 36.43 kg/m².    Intake/Output Summary (Last 24 hours) at 7/7/2024 1347  Last data filed at 7/7/2024 0546  Gross per 24 hour   Intake 780 ml   Output 5 ml   Net 775 ml         Physical Exam  Constitutional:       Appearance: Normal appearance.   HENT:      Head: Normocephalic and atraumatic.      Right Ear: External ear normal.      Left Ear: External ear normal.      Nose: Nose normal.      Mouth/Throat:      Mouth: Mucous membranes are moist.   Eyes:      Extraocular Movements: Extraocular movements intact.      Pupils: Pupils are equal, round, and reactive to light.   Cardiovascular:      Rate and Rhythm: Normal rate and regular rhythm.      Pulses:  "Normal pulses.      Heart sounds: Normal heart sounds.   Abdominal:      General: Abdomen is flat.   Musculoskeletal:         General: Normal range of motion.   Skin:     General: Skin is warm.      Capillary Refill: Capillary refill takes less than 2 seconds.   Neurological:      General: No focal deficit present.      Mental Status: She is alert and oriented to person, place, and time.             Significant Labs: All pertinent labs within the past 24 hours have been reviewed.    Significant Imaging: I have reviewed all pertinent imaging results/findings within the past 24 hours.    Assessment/Plan:      * Sepsis  This patient does have evidence of infective focus. My overall impression is sepsis. Source: Skin and Soft Tissue (location Abdominal). Not requiring pressors. Source control achieved by: vanc/meropenem.  Concerns septic source may be PEG site with associated purulent wound despite Wound Care attempts to protect with barriers. PEG placed by General Surgery 2/2024. General Surgery consulted regarding PEG site ordered CT No evidence of infection  US extremities negative for thrombus   CT pan scan w/ IV contrast without evidence of infection  Interventional nephrology consulted for line eval - low suspicion for clot, getting blood cx to r/o infected line  Peripheral smear with findings concerning for infection    Tunneled catheter removed and replaced  Catheter line + for proteus  Completed Ertapenem course per ID    Hyponatremia  We will monitor sodium Daily. The hyponatremia is due to ESRD. Discussed with nephrology, dialysate bath adjusted to account for and correct hyponatremia.   Hyponatremia has been stably low. Ordered urine osm, serum osm, and urine sodium ordered. Pt not making urine. Serum osm 275.   No results for input(s): "NA" in the last 24 hours.    Per Nephrology. Discontinued FWF.  Na bath with HD        ESRD (end stage renal disease) on dialysis  Creatine stable for now. BMP reviewed- " noted Estimated Creatinine Clearance: 13.9 mL/min (A) (based on SCr of 5.8 mg/dL (H)). according to latest data. Based on current GFR, CKD stage is end stage.  Monitor UOP and serial BMP and adjust therapy as needed. Renally dose meds. Avoid nephrotoxic medications and procedures.    -- HD MWF changed to TTS schedule per nephrology  -- nephro following  -- Hypophosphatemic on sevelamer  -- SW onboard for placement to [facility] to continue dialysis.    Complication of vascular dialysis catheter  Cath intermittently clogging, not resolving with cath-hedy, going for IR venogram 4/30 with possible exchange. Permacath sluggish flow rate concerns have resolved, working well.   S/p exchange with IR on 4/30  S/p 2nd exchange for concern of line infection in setting occult infection.     - Dialysis on TTS schedule per Nephrology      Ros's gangrene  Pt experienced severe episode of ros's gangrene in January of 2024. Pt underwent extensive course, currently still healing from this, but no longer has wound vac. Picture in media tabs    - Wound care while inpatient  - NH with wound care orders    Debility  Needs:  Wheelchair: patient has a mobility limitation that significantly impairs her ability to participate in one or more mobility related activities of daily living (MRADL's) such as toileting, feeding, dressing, grooming, and bathing in customary locations in the home.  The mobility limitation cannot be sufficiently resolved by the use of a cane or walker.   The use of a manual wheelchair will significantly improve the patient's ability to participate in MRADLS and the patient will use it on regular basis in the home.  Family/pt has expressed her willingness to use a manual wheelchair in the home.  She also has a caregiver who is capable of assisting in mobility.    Mrs Saravia requires a hospital bed due to her requiring positioning of the body in ways not feasible with an ordinary bed to alleviate pain/ is  completely immobile /or limited mobility and cannot independently make changes in body position without the use of the bed.  The positioning of the body cannot be sufficiently resolved by the use of pillows and wedges    Patient has a mobility limitation that significantly impairs their ability to participate in one or more mobility related activities of daily living, including toileting. This deficit can be resolved by using a bedside commode. Patient demonstrates mobility limitations that will cause them to be confined to one room at home without bathroom access for up to 30 days. Using a bedside commode will greatly improve the patient's ability to participate in MRADLs     Constipation  Stool burden on CT    -continue bowel reg  -monitor for BMs    Cervical stenosis of spinal canal  Outpatient MRI and NSGY f/up    Intertrigo        Irritant contact dermatitis due friction or contact with other specified body fluids  Wound care following     Moderate malnutrition  Nutrition consulted. Most recent weight and BMI monitored-     Measurements:  Wt Readings from Last 1 Encounters:   07/03/24 105.5 kg (232 lb 9.4 oz)   Body mass index is 36.43 kg/m².    Patient has been screened and assessed by RD.    Malnutrition Type:  Context: acute illness or injury  Level: moderate    Malnutrition Characteristic Summary:  Weight Loss (Malnutrition): 10% in 6 months  Subcutaneous Fat (Malnutrition): mild depletion  Muscle Mass (Malnutrition): mild depletion  Fluid Accumulation (Malnutrition): mild    Interventions/Recommendations (treatment strategy):  - TF  - previous history of failed swallow studies    Prolonged QT interval  Limit Qtc prolonging drugs as able    Spastic hemiplegia of right dominant side as late effect of cerebrovascular disease  Important that patient is able to sit in chair for 4h for dialysis placement needs, even if she requires lift/assistance getting into the chair.This patient has Chronic right hemiplegia  "due to stroke. Physical therapy services has been scheduled. Continue all standard measures for pressure injury prevention and consult wound care for any wounds (chronic or acute).    PEG (percutaneous endoscopic gastrostomy) status  On PEG tube.Wound care with PEG dressing changes.   CT abdomen with PEG in correct location    - Tube feeds as toelrated    Leukocytosis  See "Sepsis"    Type 2 diabetes mellitus with hyperglycemia, with long-term current use of insulin  Adjusting insulin to account for diabetic TF. POCG in good range.    Patient's FSGs are controlled on current medication regimen.  Last A1c reviewed-   Lab Results   Component Value Date    HGBA1C 6.2 (H) 05/27/2024     Most recent fingerstick glucose reviewed-   Recent Labs   Lab 07/03/24  1724 07/03/24  2044 07/04/24  0935 07/04/24  1314   POCTGLUCOSE 133* 114* 102 110       Current correctional scale  Medium  Maintain anti-hyperglycemic dose as follows-   Antihyperglycemics (From admission, onward)      Start     Stop Route Frequency Ordered    06/09/24 2100  insulin glargine U-100 (Lantus) pen 13 Units         -- SubQ Nightly 06/09/24 0756    06/09/24 1255  insulin aspart U-100 pen 0-10 Units         -- SubQ Before meals & nightly PRN 06/09/24 1155          Hold Oral hypoglycemics while patient is in the hospital.  POCT glucose checks   Continue tube feeds      VTE Risk Mitigation (From admission, onward)           Ordered     heparin (porcine) injection 1,000 Units  As needed (PRN)         06/21/24 0828     heparin (porcine) injection 1,000 Units  As needed (PRN)         06/10/24 0035     heparin (porcine) injection 3,000 Units  As needed (PRN)         04/17/24 0825                    Discharge Planning   ZEKE: 7/8/2024     Code Status: Full Code   Is the patient medically ready for discharge?: No    Reason for patient still in hospital (select all that apply): Pending disposition  Discharge Plan A: New Nursing Home placement - skilled nursing care " facility   Discharge Delays: (!) Payor Issues              Samra Leavitt MD  Department of Hospital Medicine   [facility] - Telemetry Stepdown

## 2024-07-07 NOTE — ASSESSMENT & PLAN NOTE
Creatine stable for now. BMP reviewed- noted Estimated Creatinine Clearance: 13.9 mL/min (A) (based on SCr of 5.8 mg/dL (H)). according to latest data. Based on current GFR, CKD stage is end stage.  Monitor UOP and serial BMP and adjust therapy as needed. Renally dose meds. Avoid nephrotoxic medications and procedures.    -- HD MWF changed to TTS schedule per nephrology  -- nephro following  -- Hypophosphatemic on sevelamer  -- SW onboard for placement to [facility] to continue dialysis.

## 2024-07-07 NOTE — SUBJECTIVE & OBJECTIVE
Interval History: PT pulled out IV overnight. VSS. No labs today. Got dialysis yesterday, no complications. Plan to discharge 7/8 to [facility].     Review of Systems   Unable to perform ROS: Patient nonverbal     Objective:     Vital Signs (Most Recent):  Temp: 98.3 °F (36.8 °C) (07/07/24 1131)  Pulse: 88 (07/07/24 1131)  Resp: 18 (07/07/24 1131)  BP: 104/64 (07/07/24 1131)  SpO2: 95 % (07/07/24 1131) Vital Signs (24h Range):  Temp:  [98 °F (36.7 °C)-98.7 °F (37.1 °C)] 98.3 °F (36.8 °C)  Pulse:  [] 88  Resp:  [18-19] 18  SpO2:  [95 %-100 %] 95 %  BP: (104-133)/(58-82) 104/64     Weight: 105.5 kg (232 lb 9.4 oz)  Body mass index is 36.43 kg/m².    Intake/Output Summary (Last 24 hours) at 7/7/2024 1347  Last data filed at 7/7/2024 0546  Gross per 24 hour   Intake 780 ml   Output 5 ml   Net 775 ml         Physical Exam  Constitutional:       Appearance: Normal appearance.   HENT:      Head: Normocephalic and atraumatic.      Right Ear: External ear normal.      Left Ear: External ear normal.      Nose: Nose normal.      Mouth/Throat:      Mouth: Mucous membranes are moist.   Eyes:      Extraocular Movements: Extraocular movements intact.      Pupils: Pupils are equal, round, and reactive to light.   Cardiovascular:      Rate and Rhythm: Normal rate and regular rhythm.      Pulses: Normal pulses.      Heart sounds: Normal heart sounds.   Abdominal:      General: Abdomen is flat.   Musculoskeletal:         General: Normal range of motion.   Skin:     General: Skin is warm.      Capillary Refill: Capillary refill takes less than 2 seconds.   Neurological:      General: No focal deficit present.      Mental Status: She is alert and oriented to person, place, and time.             Significant Labs: All pertinent labs within the past 24 hours have been reviewed.    Significant Imaging: I have reviewed all pertinent imaging results/findings within the past 24 hours.

## 2024-07-07 NOTE — ASSESSMENT & PLAN NOTE
"We will monitor sodium Daily. The hyponatremia is due to ESRD. Discussed with nephrology, dialysate bath adjusted to account for and correct hyponatremia.   Hyponatremia has been stably low. Ordered urine osm, serum osm, and urine sodium ordered. Pt not making urine. Serum osm 275.   No results for input(s): "NA" in the last 24 hours.    Per Nephrology. Discontinued FWF.  Na bath with HD      "

## 2024-07-07 NOTE — SUBJECTIVE & OBJECTIVE
Interval History: NAEON. VSS. Plan for dialysis today to return to MWF schedule. Hyponatremia stably low. Medically stable for discharge.    Review of Systems   Unable to perform ROS: Patient nonverbal     Objective:     Vital Signs (Most Recent):  Temp: 98.3 °F (36.8 °C) (07/07/24 1131)  Pulse: 88 (07/07/24 1131)  Resp: 18 (07/07/24 1131)  BP: 104/64 (07/07/24 1131)  SpO2: 95 % (07/07/24 1131) Vital Signs (24h Range):  Temp:  [98 °F (36.7 °C)-98.7 °F (37.1 °C)] 98.3 °F (36.8 °C)  Pulse:  [] 88  Resp:  [18-19] 18  SpO2:  [95 %-100 %] 95 %  BP: (104-133)/(58-82) 104/64     Weight: 105.5 kg (232 lb 9.4 oz)  Body mass index is 36.43 kg/m².    Intake/Output Summary (Last 24 hours) at 7/7/2024 1411  Last data filed at 7/7/2024 0546  Gross per 24 hour   Intake 780 ml   Output 5 ml   Net 775 ml         Physical Exam  Constitutional:       Appearance: Normal appearance.   HENT:      Head: Normocephalic and atraumatic.      Right Ear: External ear normal.      Left Ear: External ear normal.      Nose: Nose normal.      Mouth/Throat:      Mouth: Mucous membranes are moist.   Eyes:      Extraocular Movements: Extraocular movements intact.      Pupils: Pupils are equal, round, and reactive to light.   Cardiovascular:      Rate and Rhythm: Normal rate and regular rhythm.      Pulses: Normal pulses.      Heart sounds: Normal heart sounds.   Abdominal:      General: Abdomen is flat.   Musculoskeletal:         General: Normal range of motion.   Skin:     General: Skin is warm.      Capillary Refill: Capillary refill takes less than 2 seconds.   Neurological:      General: No focal deficit present.      Mental Status: She is alert and oriented to person, place, and time.             Significant Labs: All pertinent labs within the past 24 hours have been reviewed.    Significant Imaging: I have reviewed all pertinent imaging results/findings within the past 24 hours.

## 2024-07-07 NOTE — NURSING
Nurses Note -- 4 Eyes      07/06/2024   20:23 PM      Skin assessed during: Q Shift Change      [] No Altered Skin Integrity Present    []Prevention Measures Documented      [x] Yes- Altered Skin Integrity Present or Discovered   [] LDA Added if Not in Epic (Describe Wound)   [] New Altered Skin Integrity was Present on Admit and Documented in LDA   [] Wound Image Taken    Wound Care Consulted? Yes    Attending Nurse:  FLORENCIO Nguyen RN     Second RN/Staff Member:  FLORENCIO FARRIS

## 2024-07-07 NOTE — ASSESSMENT & PLAN NOTE
Cath intermittently clogging, not resolving with cath-hedy, going for IR venogram 4/30 with possible exchange. Permacath sluggish flow rate concerns have resolved, working well.   S/p exchange with IR on 4/30  S/p 2nd exchange for concern of line infection in setting occult infection.     - Dialysis on TTS schedule per Nephrology

## 2024-07-07 NOTE — NURSING
Report received from Naina  Patient remains free from fall and injury. NAD noted, VSS. Bed locked and in low position, Safety maintained. Call light in reach, white board updated and explained, no c/o pain n/v, diarrhea or sob, will cont with POC    Nurses Note -- 4 Eyes      7/7/2024   7:30 AM      Skin assessed during: Daily Assessment      [] No Altered Skin Integrity Present    []Prevention Measures Documented      [x] Yes- Altered Skin Integrity Present or Discovered   [] LDA Added if Not in Epic (Describe Wound)   [] New Altered Skin Integrity was Present on Admit and Documented in LDA   [] Wound Image Taken    Wound Care Consulted? No    Attending Nurse:  Champ Eastman RN/Staff Member:  Naina

## 2024-07-08 VITALS
WEIGHT: 232.56 LBS | TEMPERATURE: 98 F | DIASTOLIC BLOOD PRESSURE: 86 MMHG | BODY MASS INDEX: 36.5 KG/M2 | SYSTOLIC BLOOD PRESSURE: 129 MMHG | HEIGHT: 67 IN | HEART RATE: 104 BPM | OXYGEN SATURATION: 100 % | RESPIRATION RATE: 19 BRPM

## 2024-07-08 LAB
ALBUMIN SERPL BCP-MCNC: 3 G/DL (ref 3.5–5.2)
ALP SERPL-CCNC: 101 U/L (ref 55–135)
ALT SERPL W/O P-5'-P-CCNC: 16 U/L (ref 10–44)
ANION GAP SERPL CALC-SCNC: 13 MMOL/L (ref 8–16)
AST SERPL-CCNC: 26 U/L (ref 10–40)
BASOPHILS # BLD AUTO: 0.04 K/UL (ref 0–0.2)
BASOPHILS NFR BLD: 0.7 % (ref 0–1.9)
BILIRUB SERPL-MCNC: 0.3 MG/DL (ref 0.1–1)
BUN SERPL-MCNC: 26 MG/DL (ref 6–20)
CALCIUM SERPL-MCNC: 10 MG/DL (ref 8.7–10.5)
CHLORIDE SERPL-SCNC: 90 MMOL/L (ref 95–110)
CO2 SERPL-SCNC: 21 MMOL/L (ref 23–29)
CREAT SERPL-MCNC: 6.6 MG/DL (ref 0.5–1.4)
DIFFERENTIAL METHOD BLD: ABNORMAL
EOSINOPHIL # BLD AUTO: 0.5 K/UL (ref 0–0.5)
EOSINOPHIL NFR BLD: 8.2 % (ref 0–8)
ERYTHROCYTE [DISTWIDTH] IN BLOOD BY AUTOMATED COUNT: 16.6 % (ref 11.5–14.5)
EST. GFR  (NO RACE VARIABLE): 7 ML/MIN/1.73 M^2
GLUCOSE SERPL-MCNC: 112 MG/DL (ref 70–110)
HCT VFR BLD AUTO: 30.9 % (ref 37–48.5)
HGB BLD-MCNC: 9 G/DL (ref 12–16)
IMM GRANULOCYTES # BLD AUTO: 0.06 K/UL (ref 0–0.04)
IMM GRANULOCYTES NFR BLD AUTO: 1.1 % (ref 0–0.5)
LYMPHOCYTES # BLD AUTO: 1.7 K/UL (ref 1–4.8)
LYMPHOCYTES NFR BLD: 30.4 % (ref 18–48)
MAGNESIUM SERPL-MCNC: 1.9 MG/DL (ref 1.6–2.6)
MCH RBC QN AUTO: 23.2 PG (ref 27–31)
MCHC RBC AUTO-ENTMCNC: 29.1 G/DL (ref 32–36)
MCV RBC AUTO: 80 FL (ref 82–98)
MONOCYTES # BLD AUTO: 0.7 K/UL (ref 0.3–1)
MONOCYTES NFR BLD: 12.9 % (ref 4–15)
NEUTROPHILS # BLD AUTO: 2.6 K/UL (ref 1.8–7.7)
NEUTROPHILS NFR BLD: 46.7 % (ref 38–73)
NRBC BLD-RTO: 0 /100 WBC
PHOSPHATE SERPL-MCNC: 4.1 MG/DL (ref 2.7–4.5)
PLATELET # BLD AUTO: 262 K/UL (ref 150–450)
PMV BLD AUTO: 10.1 FL (ref 9.2–12.9)
POCT GLUCOSE: 123 MG/DL (ref 70–110)
POCT GLUCOSE: 147 MG/DL (ref 70–110)
POTASSIUM SERPL-SCNC: 4.1 MMOL/L (ref 3.5–5.1)
PROT SERPL-MCNC: 7.4 G/DL (ref 6–8.4)
RBC # BLD AUTO: 3.88 M/UL (ref 4–5.4)
SODIUM SERPL-SCNC: 124 MMOL/L (ref 136–145)
WBC # BLD AUTO: 5.49 K/UL (ref 3.9–12.7)

## 2024-07-08 PROCEDURE — 80100014 HC HEMODIALYSIS 1:1

## 2024-07-08 PROCEDURE — 84100 ASSAY OF PHOSPHORUS: CPT

## 2024-07-08 PROCEDURE — 63600175 PHARM REV CODE 636 W HCPCS: Performed by: NURSE PRACTITIONER

## 2024-07-08 PROCEDURE — 25000003 PHARM REV CODE 250

## 2024-07-08 PROCEDURE — 83735 ASSAY OF MAGNESIUM: CPT

## 2024-07-08 PROCEDURE — 80053 COMPREHEN METABOLIC PANEL: CPT

## 2024-07-08 PROCEDURE — 99232 SBSQ HOSP IP/OBS MODERATE 35: CPT | Mod: ,,, | Performed by: NURSE PRACTITIONER

## 2024-07-08 PROCEDURE — 25000003 PHARM REV CODE 250: Performed by: STUDENT IN AN ORGANIZED HEALTH CARE EDUCATION/TRAINING PROGRAM

## 2024-07-08 PROCEDURE — 85025 COMPLETE CBC W/AUTO DIFF WBC: CPT

## 2024-07-08 PROCEDURE — 36415 COLL VENOUS BLD VENIPUNCTURE: CPT

## 2024-07-08 RX ORDER — SODIUM CHLORIDE 9 MG/ML
INJECTION, SOLUTION INTRAVENOUS ONCE
Status: CANCELLED | OUTPATIENT
Start: 2024-07-08 | End: 2024-07-08

## 2024-07-08 RX ADMIN — Medication 500 MG: at 08:07

## 2024-07-08 RX ADMIN — ATORVASTATIN CALCIUM 40 MG: 40 TABLET, FILM COATED ORAL at 08:07

## 2024-07-08 RX ADMIN — ASPIRIN 81 MG CHEWABLE TABLET 81 MG: 81 TABLET CHEWABLE at 08:07

## 2024-07-08 RX ADMIN — PANTOPRAZOLE SODIUM 40 MG: 40 GRANULE, DELAYED RELEASE ORAL at 08:07

## 2024-07-08 RX ADMIN — METOPROLOL TARTRATE 25 MG: 25 TABLET, FILM COATED ORAL at 08:07

## 2024-07-08 RX ADMIN — POLYETHYLENE GLYCOL 3350 17 G: 17 POWDER, FOR SOLUTION ORAL at 08:07

## 2024-07-08 RX ADMIN — SODIUM BICARBONATE 650 MG: 650 TABLET ORAL at 08:07

## 2024-07-08 RX ADMIN — HEPARIN SODIUM 1000 UNITS: 1000 INJECTION, SOLUTION INTRAVENOUS; SUBCUTANEOUS at 05:07

## 2024-07-08 RX ADMIN — HEPARIN SODIUM 3000 UNITS: 1000 INJECTION, SOLUTION INTRAVENOUS; SUBCUTANEOUS at 02:07

## 2024-07-08 NOTE — ASSESSMENT & PLAN NOTE
We will monitor sodium Daily. The hyponatremia is due to ESRD. Discussed with nephrology, dialysate bath adjusted to account for and correct hyponatremia.   Ordered urine osm, serum osm, and urine sodium ordered. Pt not making urine, unable to complete Urine osm and Urine serm labs. Serum osm 275. Stably low.   Recent Labs   Lab 07/08/24  1055   *       Per Nephrology, continue holding FWF until Na gets back up to 136. Na bath with HD

## 2024-07-08 NOTE — ASSESSMENT & PLAN NOTE
Cath intermittently clogging, not resolving with cath-hedy, going for IR venogram 4/30 with possible exchange. Permacath flow rate concerns have resolved, working well.   S/p exchange with IR on 4/30  S/p 2nd exchange for concern of line infection in setting occult infection.

## 2024-07-08 NOTE — ASSESSMENT & PLAN NOTE
Adjusting insulin to account for diabetic TF. POCG in good range.    Patient's FSGs are controlled on current medication regimen.  Last A1c reviewed-   Lab Results   Component Value Date    HGBA1C 6.2 (H) 05/27/2024     Most recent fingerstick glucose reviewed-   Recent Labs   Lab 07/07/24  1223 07/07/24  1606 07/07/24  2141   POCTGLUCOSE 134* 131* 125*     Current correctional scale  Medium  Maintain anti-hyperglycemic dose as follows-   Antihyperglycemics (From admission, onward)      Start     Stop Route Frequency Ordered    06/09/24 2100  insulin glargine U-100 (Lantus) pen 13 Units         -- SubQ Nightly 06/09/24 0756    06/09/24 1255  insulin aspart U-100 pen 0-10 Units         -- SubQ Before meals & nightly PRN 06/09/24 1155          Hold Oral hypoglycemics while patient is in the hospital.  POCT glucose checks   Continue tube feeds

## 2024-07-08 NOTE — ASSESSMENT & PLAN NOTE
Creatine stable for now. BMP reviewed- noted Estimated Creatinine Clearance: 13.9 mL/min (A) (based on SCr of 5.8 mg/dL (H)). according to latest data. Based on current GFR, CKD stage is end stage.  Monitor UOP and serial BMP and adjust therapy as needed. Renally dose meds. Avoid nephrotoxic medications and procedures.     -- Completed dialysis today, now on HD MWF schedule   -- nephro following  -- Hypophosphatemic on sevelamer  -- SW onboard for placement to [facility] to continue dialysis.

## 2024-07-08 NOTE — PLAN OF CARE
CM informed by Nephrology SW that [facility] now wants patient to return to her M/W/F schedule and will need to receive dialysis before discharge. NH orders faxed manually to [facility] (F) [phone]. 4 Team made aware of above conversation. Will continue to follow.    11:05AM  Call placed to [facility] ([phone]) to check on patient's admission status. This CM was informed by [facility] in admissions that they would be able to accept patient this evening after dialysis.    12:10PM  Call received from [facility] and she inquired if patient would need free water flushes with TFs . Message sent to IM4 Team requesting clarification and if needed updated NH orders.    14: 40PM   Updated NH orders faxed to [facility].    15:15PM  Call received from [facility] informing this CM that they reviewed the orders and can accept patient this evening after she returns from dialysis. Ambulance transportation arranged for 17:30PM. Patient will be discharging to [address] and the nurse can call report to [phone]. A call was placed to patient's daughter Aleks ([phone]) to update her on the above. She expressed understanding.    Sara Loredo, RN  Ext [phone]

## 2024-07-08 NOTE — SUBJECTIVE & OBJECTIVE
Interval History: [facility] [place] switched patient back to [facility] schedule. Plan for HD today.     Review of patient's allergies indicates:  No Known Allergies  Current Facility-Administered Medications   Medication Frequency    acetaminophen oral solution 650 mg Q6H PRN    ascorbic acid (vitamin C) tablet 500 mg BID    aspirin chewable tablet 81 mg Daily    atorvastatin tablet 40 mg Daily    dextrose 10% bolus 125 mL 125 mL PRN    dextrose 10% bolus 250 mL 250 mL PRN    epoetin merry injection 6,100 Units Every Mon, Wed, Fri    glucagon (human recombinant) injection 1 mg PRN    glucose chewable tablet 16 g PRN    glucose chewable tablet 24 g PRN    heparin (porcine) injection 1,000 Units PRN    heparin (porcine) injection 3,000 Units PRN    hepatitis B virus vacc.rec(PF) injection 20 mcg vaccine x 1 dose    insulin aspart U-100 pen 0-10 Units QID (AC + HS) PRN    insulin glargine U-100 (Lantus) pen 13 Units QHS    metoprolol tartrate (LOPRESSOR) tablet 25 mg BID    ondansetron 4 mg/5 mL solution 4 mg Q6H PRN    oxyCODONE 5 mg/5 mL solution 5 mg Q4H PRN    pantoprazole suspension 40 mg Daily    polyethylene glycol packet 17 g TID    sodium bicarbonate tablet 650 mg TID    sodium chloride 0.9% bolus 250 mL 250 mL PRN    sodium chloride 0.9% flush 10 mL Q12H PRN    sodium chloride 0.9% flush 10 mL PRN       Objective:     Vital Signs (Most Recent):  Temp: 97.6 °F (36.4 °C) (07/08/24 0756)  Pulse: 99 (07/08/24 0854)  Resp: 18 (07/08/24 0756)  BP: 95/64 (07/08/24 0854)  SpO2: 100 % (07/08/24 0756) Vital Signs (24h Range):  Temp:  [97.6 °F (36.4 °C)-98.4 °F (36.9 °C)] 97.6 °F (36.4 °C)  Pulse:  [83-99] 99  Resp:  [18] 18  SpO2:  [95 %-100 %] 100 %  BP: ()/(64-88) 95/64     Weight: 105.5 kg (232 lb 9.4 oz) (07/03/24 1000)  Body mass index is 36.43 kg/m².  Body surface area is 2.23 meters squared.    I/O last 3 completed shifts:  In: 1076 [NG/GT:1076]  Out: 5 [Drains:5]     Physical Exam  Vitals and nursing note reviewed.    Constitutional:       Appearance: Normal appearance.   HENT:      Head: Normocephalic and atraumatic.      Right Ear: External ear normal.      Left Ear: External ear normal.      Nose: Nose normal.      Mouth/Throat:      Mouth: Mucous membranes are moist.   Eyes:      Extraocular Movements: Extraocular movements intact.      Pupils: Pupils are equal, round, and reactive to light.   Cardiovascular:      Rate and Rhythm: Normal rate and regular rhythm.      Pulses: Normal pulses.      Heart sounds: Normal heart sounds.   Abdominal:      General: Abdomen is flat.   Musculoskeletal:         General: Normal range of motion.   Skin:     General: Skin is warm.   Neurological:      General: No focal deficit present.      Mental Status: She is alert and oriented to person, place, and time.          Significant Labs:  CBC:   Recent Labs   Lab 07/06/24  0649   WBC 5.98   RBC 3.92*   HGB 9.3*   HCT 31.1*      MCV 79*   MCH 23.7*   MCHC 29.9*     CMP:   Recent Labs   Lab 07/06/24  0649   GLU 97   CALCIUM 9.7   ALBUMIN 2.9*   PROT 7.3   *   K 3.8   CO2 24   CL 86*   BUN 22*   CREATININE 5.8*   ALKPHOS 89   ALT 15   AST 23   BILITOT 0.3     All labs within the past 24 hours have been reviewed.

## 2024-07-08 NOTE — PROGRESS NOTES
Please see previous notes from this SW for continuity.    __________________________________________________  SW spoke with [facility] unit manager Jaimee. Per Jaimee, pt had to switch back to a MWF schedule for OP HD. Jaimee requesting pt dialyzes in hospital before discharge and will start with [facility] on Wednesday (07/10/2024).    Inpt treatment team updated via secure chat.    KARI to follow for pt's discharge.    UPDATE 11:50AM: SW contacted [facility] unit manager Jaimee. SW informed pt planned to discharging today.     Please see below for pt's outpt dialysis information:    -[facility]  -[address] [place], [place]  [phone]  -Schedule: MWF at 11:00AM  -Anticipated start date: Wednesday, 07/10/2024  **Pt must arrive 30 minutes early to initial appt to sign consents**        Dafne Mccord, GAYLA  [facility]  [phone]

## 2024-07-08 NOTE — NURSING
Report received from Naina.  Patient remains free from fall and injury. NAD noted, VSS, Questions encouraged and answered.  Patient verbalized understanding. Bed locked and in low position, Safety maintained. Call light in reach, white board updated and explained, no c/o pain n/v, diarrhea or sob, will cont with POC      Nurses Note -- 4 Eyes      7/8/2024   07:05      Skin assessed during: Daily Assessment      [] No Altered Skin Integrity Present    []Prevention Measures Documented      [x] Yes- Altered Skin Integrity Present or Discovered   [] LDA Added if Not in Epic (Describe Wound)   [] New Altered Skin Integrity was Present on Admit and Documented in LDA   [] Wound Image Taken    Wound Care Consulted? No    Attending Nurse:  Champ Eastman RN/Staff Member:  Naina

## 2024-07-08 NOTE — NURSING
Nurses Note -- 4 Eyes      07/07/2024   19:43 PM      Skin assessed during: Q Shift Change      [] No Altered Skin Integrity Present    []Prevention Measures Documented      [x] Yes- Altered Skin Integrity Present or Discovered   [] LDA Added if Not in Epic (Describe Wound)   [] New Altered Skin Integrity was Present on Admit and Documented in LDA   [] Wound Image Taken    Wound Care Consulted? No    Attending Nurse:  FLORENCIO Nguyen RN     Second RN/Staff Member:  CRISTA Grossman RN

## 2024-07-08 NOTE — PROGRESS NOTES
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    Hypermagnesemia 04/11/2024    POA, Mg 3.2  Daily chem       Morbid obesity     Necrotizing fasciitis        Social History     Socioeconomic History    Marital status: Single   Tobacco Use    Smoking status: Unknown     Social Determinants of Health     Financial Resource Strain: Patient Unable To Answer (3/14/2024)    Overall Financial Resource Strain (CARDIA)     Difficulty of Paying Living Expenses: Patient unable to answer   Recent Concern: Financial Resource Strain - High Risk (3/9/2024)    Overall Financial Resource Strain (CARDIA)     Difficulty of Paying Living Expenses: Very hard   Food Insecurity: Patient Unable To Answer (3/14/2024)    Hunger Vital Sign     Worried About Running Out of Food in the Last Year: Patient unable to answer     Ran Out of Food in the Last Year: Patient unable to answer   Transportation Needs: Patient Unable To Answer (3/14/2024)    PRAPARE - Transportation     Lack of Transportation (Medical): Patient unable to answer     Lack of Transportation (Non-Medical): Patient unable to answer   Physical Activity: Inactive (3/13/2024)    Exercise Vital Sign     Days of Exercise per Week: 0 days     Minutes of Exercise per Session: 0 min   Stress: Patient Unable To Answer (3/14/2024)    [employer] - Occupational Stress Questionnaire     Feeling of Stress : Patient unable to answer   Housing Stability: Patient Unable To Answer (3/14/2024)    Housing Stability Vital Sign     Unable to Pay for Housing in the Last Year: Patient unable to answer     Number of Places Lived in the Last Year: 1     Unstable Housing in the Last Year: Patient unable to answer   Recent Concern: Housing Stability - High Risk (3/9/2024)     Housing Stability Vital Sign     Unable to Pay for Housing in the Last Year: Yes     Unstable Housing in the Last Year: No       Precautions:     Allergies as of 04/10/2024    (No Known Allergies)       WOC Assessment Details/Treatment     Attempted to see patient for follow up. Patient off the unit at this time. Will attempt to follow up at another  time. Nursing  to continue care.   07/08/2024

## 2024-07-08 NOTE — ASSESSMENT & PLAN NOTE
54 y.o. Black or  Female ESRD-HD M-W-F at [facility] presents to ED on 4/10/2024 with UTI.    Of note, the patient was initiated on RRT in February 2024 after being admitted with ALVARADO 2/2 iATN due to septic shock. Perm cath placed on 2/29/24. She was transferred to [facility] on 3/12. Deemed ESRD at [facility].  Nephrology consulted for inpatient ESRD-HD management    Assessment:   - HD today prior to discharge.   - Na 125 on  Continue to hold FWF.  Adjust Na bath with HD  - Continue renal TF  - Continue to monitor intake and output  - Please avoid gadolinium, fleets, phos-based laxatives, NSAIDs  - Dialysis thrice weekly unless more urgent indications arise. Will evaluate RRT requirements Daily.      Lab Results   Component Value Date    FESATURATED 10 (L) 03/15/2024    FERRITIN 414 (H) 03/15/2024       - Goal in ESRD is Hgb of 10-11. Continue EPO.

## 2024-07-08 NOTE — DISCHARGE SUMMARY
Woody Jones - Telemetry [facility] Medicine  Discharge Summary      Patient Name: Sarah Saravia  MRN: [id number]  JOSELIN: [id number]  Patient Class: IP- Inpatient  Admission Date: 4/10/2024  Hospital Length of Stay: 89 days  Discharge Date and Time:  07/08/2024 2:04 PM  Attending Physician: Violetta Odonnell*   Discharging Provider: Samra Leavitt MD  Primary Care Provider: Deanna, Primary Doctor  [facility] Medicine Team: [facility] HOSP MED 4 Samra Leavitt MD  Primary Care Team: [facility] HOSP MED 4    HPI:   53 yof with 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 History was given from patient's daughter. She was undergoing dialysis today and noticed she was lethargic, less alert than usual self. Pt completed dialysis and still not acting herself. EMS was called, fever of a 100.  On chart review, she did have an episode of large volume emesis around 1700.  Per EMS, she had a slightly elevated temp 100.0°F, glucose 300s.     In the ED: UA 2+ leuks, >100 WBC, many bacteria, WBC 17, CT abd/pelvis concerning for cystitis. Given vanc/zosyn    Procedure(s) (LRB):  Insertion, Catheter, Central Venous, Hemodialysis (Right)      Hospital Course:   53 JARROD admitted to Hospital Medicine service for urosepsis. Vanc/zosyn initiated, found to have staph epi in all 4 bottles, vanc/ertapenem course completed per ID. Vascular Neurology consulted concerning mental status change with the recommendation to initiate ASA 81 QD and to obtain an MRI to r/o new stroke. Per discussion with vascular neurology, MRI findings appear to be expected changes from prior stroke. Nephrology was consulted for regularly scheduled dialysis. Pulm consulted, patient decannulated 4/30. Failed swallow assessment, continuing tube feeds. Ongoing placement difficulties d/t need for accepting HD facility to have sandee lift with 2 personnel to operate. Once patient gets  accepted at [facility], next step will be to work with daughter on [facility] NH placement. SW onboard working on paperwork for long term Medicaid application. H/c c/b new fever, ID reconsulted, CT chest showing bilateral ground glass opacities, Vanc/meropenem course to be completed 6/17, however patient had further episode of fever and will need continued merem and ID consulted for assistance. CT pan scan to assess for infection. Imaging without signs of infection, patient to complete empiric abx for 5 days. Patient continues fevering despite broad abx, no identified source. Chest x-ray, peripheral smear to assess for other sources. Tunneled catheter removed and replaced via interventional nephrology at recommendation of ID. Catheter tip culture + for proteus. She completed course of ertapenem 7/5. Stably low hyponatremia 124-1325 throughout admission, likely due to ESRD. Medically ready for discharge.      Goals of Care Treatment Preferences:  Code Status: Full Code    Review of Systems   Unable to perform ROS: Patient nonverbal     Objective:     Vital Signs (Most Recent):  Temp: 98.3 °F (36.8 °C) (07/07/24 1131)  Pulse: 88 (07/07/24 1131)  Resp: 18 (07/07/24 1131)  BP: 104/64 (07/07/24 1131)  SpO2: 95 % (07/07/24 1131) Vital Signs (24h Range):  Temp:  [98 °F (36.7 °C)-98.7 °F (37.1 °C)] 98.3 °F (36.8 °C)  Pulse:  [] 88  Resp:  [18-19] 18  SpO2:  [95 %-100 %] 95 %  BP: (104-133)/(58-82) 104/64     Weight: 105.5 kg (232 lb 9.4 oz)  Body mass index is 36.43 kg/m².    Intake/Output Summary (Last 24 hours) at 7/7/2024 1411  Last data filed at 7/7/2024 0546  Gross per 24 hour   Intake 780 ml   Output 5 ml   Net 775 ml         Physical Exam  Constitutional:       Appearance: Normal appearance.   HENT:      Head: Normocephalic and atraumatic.      Right Ear: External ear normal.      Left Ear: External ear normal.      Nose: Nose normal.      Mouth/Throat:      Mouth: Mucous membranes are moist.   Eyes:       Extraocular Movements: Extraocular movements intact.      Pupils: Pupils are equal, round, and reactive to light.   Cardiovascular:      Rate and Rhythm: Normal rate and regular rhythm.      Pulses: Normal pulses.      Heart sounds: Normal heart sounds.   Abdominal:      General: Abdomen is flat.   Musculoskeletal:         General: Normal range of motion.   Skin:     General: Skin is warm.      Capillary Refill: Capillary refill takes less than 2 seconds.   Neurological:      General: No focal deficit present.      Mental Status: She is alert and oriented to person, place, and time.            Significant Labs: All pertinent labs within the past 24 hours have been reviewed.    Significant Imaging: I have reviewed all pertinent imaging results/findings within the past 24 hours.        What is most important right now is to focus on curative/life-prolongation (regardless of treatment burdens).  Accordingly, we have decided that the best plan to meet the patient's goals includes continuing with treatment.      Consults:   Consults (From admission, onward)          Status Ordering Provider     Inpatient consult to Registered Dietitian/Nutritionist  Once        Provider:  (Not yet assigned)    Completed DANIEL JOEL     Inpatient consult to Infectious Diseases  Once        Provider:  (Not yet assigned)    Completed JEFF ORTIZ     Inpatient consult to Infectious Diseases  Once        Provider:  (Not yet assigned)    Completed JEFF ORTIZ     IP consult to Interventional Nephrology  Once        Provider:  (Not yet assigned)    Completed JEFF ORTIZ     Inpatient consult to Infectious Diseases  Once        Provider:  (Not yet assigned)    Completed JEFF ORTIZ     Inpatient consult to Infectious Diseases  Once        Provider:  (Not yet assigned)    Completed PAPA EALSEY     Inpatient consult to Midline team  Once        Provider:  (Not yet assigned)    Completed MARIANO MYLES     Inpatient consult to  Midline team  Once        Provider:  (Not yet assigned)    Completed BAHVINCE, MARIANO     Inpatient consult to General Surgery  Once        Provider:  (Not yet assigned)    Completed HIMANSHU HERNANDEZ     Inpatient consult to Skin Integrity  Practitioner  Once        Provider:  Beth Langley NP    Completed BAHD, MARIANO     Inpatient consult to Midline team  Once        Provider:  (Not yet assigned)    Completed SAM NAYAK     Inpatient consult to Pulmonology  Once        Provider:  (Not yet assigned)    Completed SARA ALAMO     Inpatient consult to Interventional Radiology  Once        Provider:  (Not yet assigned)    Completed ALEJO LI     Inpatient consult to Pulmonology  Once        Provider:  (Not yet assigned)    Completed TAJ AMEZCUA     Inpatient consult to Registered Dietitian/Nutritionist  Once        Provider:  (Not yet assigned)    Completed MARY RIVERA     Inpatient consult to Infectious Diseases  Once        Provider:  (Not yet assigned)    Completed CAM NOEL     Inpatient consult to Nephrology  Once        Provider:  (Not yet assigned)    Completed CAM NOEL            Neuro  Spastic hemiplegia of right dominant side as late effect of cerebrovascular disease  Important that patient is able to sit in chair for 4h for dialysis placement needs, even if she requires lift/assistance getting into the chair.This patient has Chronic right hemiplegia due to stroke. Physical therapy services has been scheduled. Continue all standard measures for pressure injury prevention and consult wound care for any wounds (chronic or acute).    Cardiac/Vascular  Prolonged QT interval  Limit Qtc prolonging drugs as able    Renal/  Complication of vascular dialysis catheter  Cath intermittently clogging, not resolving with cath-hedy, going for IR venogram 4/30 with possible exchange. Permacath flow rate concerns have resolved, working well.   S/p exchange with IR on 4/30  S/p 2nd exchange  "for concern of line infection in setting occult infection.         ESRD (end stage renal disease) on dialysis  Creatine stable for now. BMP reviewed- noted Estimated Creatinine Clearance: 13.9 mL/min (A) (based on SCr of 5.8 mg/dL (H)). according to latest data. Based on current GFR, CKD stage is end stage.  Monitor UOP and serial BMP and adjust therapy as needed. Renally dose meds. Avoid nephrotoxic medications and procedures.     -- Completed dialysis today, now on HD MWF schedule   -- nephro following  -- Hypophosphatemic on sevelamer  -- SW onboard for placement to [facility] to continue dialysis.    Oncology  Leukocytosis  See "Sepsis"    Endocrine  Moderate malnutrition  Nutrition consulted. Most recent weight and BMI monitored-     Measurements:  Wt Readings from Last 1 Encounters:   07/03/24 105.5 kg (232 lb 9.4 oz)   Body mass index is 36.43 kg/m².    Patient has been screened and assessed by RD.    Malnutrition Type:  Context: acute illness or injury  Level: moderate    Malnutrition Characteristic Summary:  Weight Loss (Malnutrition): 10% in 6 months  Subcutaneous Fat (Malnutrition): mild depletion  Muscle Mass (Malnutrition): mild depletion  Fluid Accumulation (Malnutrition): mild    Interventions/Recommendations (treatment strategy):  - TF  - previous history of failed swallow studies    Type 2 diabetes mellitus with hyperglycemia, with long-term current use of insulin  Adjusting insulin to account for diabetic TF. POCG in good range.    Patient's FSGs are controlled on current medication regimen.  Last A1c reviewed-   Lab Results   Component Value Date    HGBA1C 6.2 (H) 05/27/2024     Most recent fingerstick glucose reviewed-   Recent Labs   Lab 07/07/24  1223 07/07/24  1606 07/07/24  2141   POCTGLUCOSE 134* 131* 125*     Current correctional scale  Medium  Maintain anti-hyperglycemic dose as follows-   Antihyperglycemics (From admission, onward)      Start     Stop Route Frequency Ordered    " 06/09/24 2100  insulin glargine U-100 (Lantus) pen 13 Units         -- SubQ Nightly 06/09/24 0756    06/09/24 1255  insulin aspart U-100 pen 0-10 Units         -- SubQ Before meals & nightly PRN 06/09/24 1155          Hold Oral hypoglycemics while patient is in the hospital.  POCT glucose checks   Continue tube feeds    Hyponatremia  We will monitor sodium Daily. The hyponatremia is due to ESRD. Discussed with nephrology, dialysate bath adjusted to account for and correct hyponatremia.   Ordered urine osm, serum osm, and urine sodium ordered. Pt not making urine, unable to complete Urine osm and Urine serm labs. Serum osm 275. Stably low.   Recent Labs   Lab 07/08/24  1055   *       Per Nephrology, continue holding FWF until Na gets back up to 136. Na bath with HD        GI  PEG (percutaneous endoscopic gastrostomy) status  On PEG tube.Wound care with PEG dressing changes.   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] 01/29/2024 Yes    Debility [R53.81] 05/16/2024 Yes    Constipation [K59.00] 04/18/2024 No    Cervical stenosis of spinal canal [M48.02] 06/20/2024 Yes    Intertrigo [L30.4] 06/30/2024 Yes    Irritant contact dermatitis due friction or contact with other specified body fluids [L24.A9] 05/28/2024 No    Moderate malnutrition [E44.0] 04/18/2024 Yes    Prolonged QT interval [R94.31] 04/11/2024 Yes    Spastic hemiplegia of right dominant side as late effect of cerebrovascular disease [I69.951] 04/10/2024 Not Applicable    PEG (percutaneous endoscopic gastrostomy) status [Z93.1] 02/23/2024 Not Applicable    Leukocytosis [D72.829] 02/05/2024 Yes     "Type 2 diabetes mellitus with hyperglycemia, with long-term current use of insulin [E11.65, Z79.4] 02/02/2024 Not Applicable      Problems Resolved During this Admission:    Diagnosis Date Noted Date Resolved POA    Vulvovaginal candidiasis [B37.31] 05/29/2024 06/24/2024 No    Acute cystitis with hematuria [N30.01] 04/11/2024 06/19/2024 Yes    Hypermagnesemia [E83.41] 04/11/2024 04/26/2024 Yes    Status post tracheostomy [Z93.0] 02/23/2024 06/19/2024 Not Applicable    Acute encephalopathy [G93.40] 02/03/2024 06/19/2024 Yes       Discharged Condition: stable    Disposition: Another Health Care Inst*    Follow Up:    Patient Instructions:      PATIENT (MARGUERITE) LIFT FOR HOME USE     Order Specific Question Answer Comments   Height: 5' 7" (1.702 m)    Weight: 122.5 kg (270 lb 1 oz)    Does patient have medical equipment at home? none    Length of need (1-99 months): 99      WHEELCHAIR FOR HOME USE     Order Specific Question Answer Comments   Hours in W/C per day: 6    Type of Wheelchair: Standard    Size(Width): 18"(STD adult)    Leg Support: STD footrests    Lap Belt: Velcro    Accessories: Anti-tippers    Cushion: Basic    Reclining Back Yes    Headrest: Yes    Height: 5' 7" (1.702 m)    Weight: 122.5 kg (270 lb 1 oz)    Does patient have medical equipment at home? none    Length of need (1-99 months): 99    Please check all that apply: Caregiver is capable and willing to operate wheelchair safely.    Please check all that apply: A reclining back is ordered.    Please check all that apply: Patient mobility limitations cannot be sufficiently resolved by the use of other ambulatory therapies.      HOSPITAL BED FOR HOME USE     Order Specific Question Answer Comments   Type: Semi-electric    Length of need (1-99 months): 99    Does patient have medical equipment at home? none    Height: 5' 7" (1.702 m)    Weight: 105.5 kg (232 lb 9.6 oz)    Please check all that apply: Patient requires positioning of the body in ways not " "feasible in an ordinary bed due to a medical condition which is expected to last at least one month.    Please check all that apply: Patient requires, for the alleviation of pain, positioning of the body in ways not feasible in an ordinary bed.      COMMODE FOR HOME USE     Order Specific Question Answer Comments   Type: Drop arm    Type: need to transfer    Height: 5' 7" (1.702 m)    Weight: 105.5 kg (232 lb 9.6 oz)    Does patient have medical equipment at home? none    Length of need (1-99 months): 99      MRI Cervical Spine Without Contrast   Standing Status: Future Standing Exp. Date: 06/20/25     Order Specific Question Answer Comments   Does the patient have or ever had a pacemaker or a defibrillator (Note: Some facilities may not be able to schedule an MRI for patients with pacemakers and defibrillators. You should contact your local radiology dept to determine if this is the case.)? No    Does the patient have an aneurysm or surgical clip, pump, nerve/brain stimulator, middle/inner ear prosthesis, or other metal implant or foreign object (bullet, shrapnel)? If they have a card related to their implant, ask them to bring it. Issues related to the implant may cause the MRI to be delayed. No    Will the patient require po anxiolysis or sedation? No    May the Radiologist modify the order per protocol to meet the clinical needs of the patient? Yes    Recist criteria? Yes    Does the patient have on a skin patch for medication with aluminized backing? No      Ambulatory referral/consult to [facility]   Standing Status: Future   Referral Priority: Routine Referral Type: Consultation   Referral Reason: Specialty Services Required   Number of Visits Requested: 1     Ambulatory referral/consult to Speech Therapy   Standing Status: Future   Referral Priority: Routine Referral Type: Speech Therapy   Referral Reason: Specialty Services Required   Requested Specialty: Speech Pathology   Number of " "Visits Requested: 1     Ambulatory referral/consult to Neurosurgery   Standing Status: Future   Referral Priority: Routine Referral Type: Consultation   Referral Reason: Specialty Services Required   Requested Specialty: Neurosurgery   Number of Visits Requested: 1           Pending Diagnostic Studies:       Procedure Component Value Units Date/Time    Basic metabolic panel [[id number]] Collected: 06/09/24 1955    Order Status: Sent Lab Status: No result     Specimen: Blood     Magnesium [[id number]] Collected: 04/25/24 0358    Order Status: Sent Lab Status: No result     Specimen: Blood     Osmolality, Urine [[id number]] Collected: 07/03/24 1528    Order Status: Sent Lab Status: In process Updated: 07/03/24 1529    Specimen: Urine, Clean Catch     Renal function panel [[id number]] Collected: 04/25/24 0358    Order Status: Sent Lab Status: No result     Specimen: Blood     Sodium, Random Urine [[id number]] Collected: 07/03/24 1528    Order Status: Sent Lab Status: In process Updated: 07/03/24 1529    Specimen: Urine, Clean Catch            Medications:  Reconciled Home Medications:      Medication List        START taking these medications      aspirin 81 MG Chew  1 tablet (81 mg total) by Per G Tube route once daily.     atorvastatin 40 MG tablet  Commonly known as: LIPITOR  1 tablet (40 mg total) by Per G Tube route once daily.     insulin aspart U-100 100 unit/mL (3 mL) Inpn pen  Commonly known as: NovoLOG  Inject 0-5 Units into the skin every 6 (six) hours as needed (Hyperglycemia). **LOW CORRECTION DOSE**  Blood Glucose  mg/dL                         151-200                0 unit  201-250                2 units  251-300                3 units  301-350                4 units  >350                     5 units  Administer subcutaneously if needed at times designated by monitoring schedule.   DO NOT HOLD correction dose insulin for patients who are  NPO.  "HIGH ALERT MEDICATION" - Administer with meals or " TF/TPN.  Replaces: insulin aspart U-100 100 unit/mL injection     insulin glargine U-100 (Lantus) 100 unit/mL (3 mL) Inpn pen  Inject 13 Units into the skin every evening.     metoprolol tartrate 25 MG tablet  Commonly known as: LOPRESSOR  1 tablet (25 mg total) by Per G Tube route 2 (two) times daily.     polyethylene glycol 17 gram Pwpk  Commonly known as: GLYCOLAX  17 g by Per G Tube route 2 (two) times daily as needed for Constipation.     sodium bicarbonate 650 MG tablet  1 tablet (650 mg total) by Per G Tube route 3 (three) times daily.            CHANGE how you take these medications      pantoprazole 40 mg suspension  Commonly known as: PROTONIX  1 packet (40 mg total) by Per G Tube route once daily.  What changed:   medication strength  how to take this  additional instructions            CONTINUE taking these medications      VITAMIN C 500 MG tablet  Generic drug: ascorbic acid (vitamin C)  500 mg by Per G Tube route 2 (two) times daily.            STOP taking these medications      amLODIPine 10 MG tablet  Commonly known as: NORVASC     carvediloL 25 MG tablet  Commonly known as: COREG     chlorhexidine 0.12 % solution  Commonly known as: PERIDEX     heparin (porcine) 5,000 unit/mL injection     insulin aspart U-100 100 unit/mL injection  Commonly known as: NovoLOG  Replaced by: insulin aspart U-100 100 unit/mL (3 mL) Inpn pen     insulin detemir U-100 100 unit/mL injection  Commonly known as: Levemir     psyllium Powd  Commonly known as: KONSYL     sevelamer carbonate 2.4 gram Pwpk  Commonly known as: RENVELA     zinc sulfate 50 mg zinc (220 mg) capsule  Commonly known as: ZINCATE              Indwelling Lines/Drains at time of discharge:   Lines/Drains/Airways       Central Venous Catheter Line  Duration                  Hemodialysis Catheter 06/27/24 1606 right internal jugular 10 days              Drain  Duration                  Gastrostomy/Enterostomy 02/22/24 1200  days                     Time spent on the discharge of patient: 30 minutes         Samra Leavitt MD  Department of Hospital Medicine  [facility]

## 2024-07-08 NOTE — PROGRESS NOTES
[facility] - Telemetry Stepdown  Nephrology  Progress Note    Patient Name: Sarah Saravia  MRN: [id number]  Admission Date: 4/10/2024  Hospital Length of Stay: 89 days  Attending Provider: Violetta Odonnell*   Primary Care Physician: No, Primary Doctor  Principal Problem:Sepsis    Subjective:     Interval History: [facility] [place] to [facility] schedule. Plan for HD today.     Review of patient's allergies indicates:  No Known Allergies  Current Facility-Administered Medications   Medication Frequency    acetaminophen oral solution 650 mg Q6H PRN    ascorbic acid (vitamin C) tablet 500 mg BID    aspirin chewable tablet 81 mg Daily    atorvastatin tablet 40 mg Daily    dextrose 10% bolus 125 mL 125 mL PRN    dextrose 10% bolus 250 mL 250 mL PRN    epoetin merry injection 6,100 Units Every Mon, Wed, Fri    glucagon (human recombinant) injection 1 mg PRN    glucose chewable tablet 16 g PRN    glucose chewable tablet 24 g PRN    heparin (porcine) injection 1,000 Units PRN    heparin (porcine) injection 3,000 Units PRN    hepatitis B virus vacc.rec(PF) injection 20 mcg vaccine x 1 dose    insulin aspart U-100 pen 0-10 Units QID (AC + HS) PRN    insulin glargine U-100 (Lantus) pen 13 Units QHS    metoprolol tartrate (LOPRESSOR) tablet 25 mg BID    ondansetron 4 mg/5 mL solution 4 mg Q6H PRN    oxyCODONE 5 mg/5 mL solution 5 mg Q4H PRN    pantoprazole suspension 40 mg Daily    polyethylene glycol packet 17 g TID    sodium bicarbonate tablet 650 mg TID    sodium chloride 0.9% bolus 250 mL 250 mL PRN    sodium chloride 0.9% flush 10 mL Q12H PRN    sodium chloride 0.9% flush 10 mL PRN       Objective:     Vital Signs (Most Recent):  Temp: 97.6 °F (36.4 °C) (07/08/24 0756)  Pulse: 99 (07/08/24 0854)  Resp: 18 (07/08/24 0756)  BP: 95/64 (07/08/24 0854)  SpO2: 100 % (07/08/24 0756) Vital Signs (24h Range):  Temp:  [97.6 °F (36.4 °C)-98.4 °F (36.9 °C)] 97.6 °F (36.4 °C)  Pulse:  [83-99] 99  Resp:  [18] 18  SpO2:   [95 %-100 %] 100 %  BP: ()/(64-88) 95/64     Weight: 105.5 kg (232 lb 9.4 oz) (07/03/24 1000)  Body mass index is 36.43 kg/m².  Body surface area is 2.23 meters squared.    I/O last 3 completed shifts:  In: 1076 [NG/GT:1076]  Out: 5 [Drains:5]     Physical Exam  Vitals and nursing note reviewed.   Constitutional:       Appearance: Normal appearance.   HENT:      Head: Normocephalic and atraumatic.      Right Ear: External ear normal.      Left Ear: External ear normal.      Nose: Nose normal.      Mouth/Throat:      Mouth: Mucous membranes are moist.   Eyes:      Extraocular Movements: Extraocular movements intact.      Pupils: Pupils are equal, round, and reactive to light.   Cardiovascular:      Rate and Rhythm: Normal rate and regular rhythm.      Pulses: Normal pulses.      Heart sounds: Normal heart sounds.   Abdominal:      General: Abdomen is flat.   Musculoskeletal:         General: Normal range of motion.   Skin:     General: Skin is warm.   Neurological:      General: No focal deficit present.      Mental Status: She is alert and oriented to person, place, and time.          Significant Labs:  CBC:   Recent Labs   Lab 07/06/24  0649   WBC 5.98   RBC 3.92*   HGB 9.3*   HCT 31.1*      MCV 79*   MCH 23.7*   MCHC 29.9*     CMP:   Recent Labs   Lab 07/06/24  0649   GLU 97   CALCIUM 9.7   ALBUMIN 2.9*   PROT 7.3   *   K 3.8   CO2 24   CL 86*   BUN 22*   CREATININE 5.8*   ALKPHOS 89   ALT 15   AST 23   BILITOT 0.3     All labs within the past 24 hours have been reviewed.       Assessment/Plan:     Renal/  ESRD (end stage renal disease) on dialysis  54 y.o. Black or  Female ESRD-HD M-W-F at [facility] presents to ED on 4/10/2024 with UTI.    Of note, the patient was initiated on RRT in February 2024 after being admitted with ALVARADO 2/2 iATN due to septic shock. Perm cath placed on 2/29/24. She was transferred to [facility] on 3/12. Deemed ESRD at [facility].  Nephrology consulted for inpatient  ESRD-HD management    Assessment:   - HD today prior to discharge.   - Na 125 on  Continue to hold FWF.  Adjust Na bath with HD  - Continue renal TF  - Continue to monitor intake and output  - Please avoid gadolinium, fleets, phos-based laxatives, NSAIDs  - Dialysis thrice weekly unless more urgent indications arise. Will evaluate RRT requirements Daily.      Lab Results   Component Value Date    FESATURATED 10 (L) 03/15/2024    FERRITIN 414 (H) 03/15/2024       - Goal in ESRD is Hgb of 10-11. Continue EPO.             ID  * Sepsis  - Management per primary      Endocrine  Hyponatremia  Hypervolemic hyponatremia  -2/2 ESRD status  -hold FWF  -Na bath with HD        Thank you for your consult. I will follow-up with patient. Please contact us if you have any additional questions.    Delfina Shi DNP  Nephrology  [facility] - Telemetry Stepdown

## 2024-07-09 NOTE — PLAN OF CARE
[facility] - Tele[facility] Stepdown  Discharge Final Note    Primary Care Provider: No, Primary Doctor    Expected Discharge Date: 7/8/2024    Future Appointments   Date Time Provider Department Center   7/18/2024  2:30 PM Kristopher Ricci MD [facility] NEUROS8 [facility]          Final Discharge Note (most recent)       Final Note - 07/09/24 0839          Final Note    Assessment Type Final Discharge Note     Anticipated Discharge Disposition long-term Nursing Facility     What phone number can be called within the next 1-3 days to see how you are doing after discharge? [phone]     Hospital Resources/Appts/Education Provided Appointments scheduled and added to AVS        Post-Acute Status    Post-Acute Authorization Placement     Post-Acute Placement Status Set-up Complete/Auth obtained   [facility]    Diaylsis Status Set-up Complete/Auth obtained   [facility]                    Important Message from Medicare      Sara Loredo, RN  Ext [phone]

## 2024-07-09 NOTE — NURSING
Patient discharged to [facility] per [facility] service via stretcher accompanied by 3 EMT's, patient awake, alert, vss, nad noted upon discharge

## 2024-07-12 ENCOUNTER — PATIENT MESSAGE (OUTPATIENT)
Dept: NEUROSURGERY | Facility: CLINIC | Age: 54
End: 2024-07-12
Payer: MEDICAID

## 2024-07-20 ENCOUNTER — DOCUMENTATION ONLY (OUTPATIENT)
Dept: NEUROLOGY | Facility: HOSPITAL | Age: 54
End: 2024-07-20
Payer: MEDICAID

## 2024-07-25 ENCOUNTER — TELEPHONE (OUTPATIENT)
Dept: NEUROSURGERY | Facility: CLINIC | Age: 54
End: 2024-07-25

## 2024-07-25 ENCOUNTER — OFFICE VISIT (OUTPATIENT)
Dept: NEUROSURGERY | Facility: CLINIC | Age: 54
End: 2024-07-25
Payer: MEDICAID

## 2024-07-25 DIAGNOSIS — M48.02 CERVICAL STENOSIS OF SPINE: Primary | ICD-10-CM

## 2024-07-25 PROCEDURE — 3044F HG A1C LEVEL LT 7.0%: CPT | Mod: CPTII,,, | Performed by: NEUROLOGICAL SURGERY

## 2024-07-25 PROCEDURE — 1111F DSCHRG MED/CURRENT MED MERGE: CPT | Mod: CPTII,,, | Performed by: NEUROLOGICAL SURGERY

## 2024-07-25 PROCEDURE — 3066F NEPHROPATHY DOC TX: CPT | Mod: CPTII,,, | Performed by: NEUROLOGICAL SURGERY

## 2024-07-25 PROCEDURE — 1159F MED LIST DOCD IN RCRD: CPT | Mod: CPTII,,, | Performed by: NEUROLOGICAL SURGERY

## 2024-07-25 PROCEDURE — 99212 OFFICE O/P EST SF 10 MIN: CPT | Mod: PBBFAC | Performed by: NEUROLOGICAL SURGERY

## 2024-07-25 PROCEDURE — 99213 OFFICE O/P EST LOW 20 MIN: CPT | Mod: S$PBB,,, | Performed by: NEUROLOGICAL SURGERY

## 2024-07-25 PROCEDURE — 99999 PR PBB SHADOW E&M-EST. PATIENT-LVL II: CPT | Mod: PBBFAC,,, | Performed by: NEUROLOGICAL SURGERY

## 2024-07-25 NOTE — TELEPHONE ENCOUNTER
Called patient's daughter and informed her of her mother's instability being the reason we had put her in a neck brace. Additionally informed her that we would be calling her to ensure she was at the visit with us next time, and that we would be calling her to coordinate this later.  She voiced understanding and thanked me.

## 2024-07-25 NOTE — PROGRESS NOTES
CHIEF COMPLAINT:  Cervical stenosis     I, Hailey Gimenez, attest that this documentation has been prepared under the direction and in the presence of Kristopher Ricci MD.    HPI:  Sarah Saravia is a 54 y.o. female, with a history of uncontrolled diabetes, end stage renal disease (on dialysis), and left-sided stroke and complete aphasia at baseline, who is referred to me by Jatin Hutchins, for evaluation of cervical stenosis. The patient is non-verbal and was transferred to the clinic today for her appointment. Unfortunately, her daughter is not here for today's appointment so we are unable to get any information about her condition. The transport team is not able to provide any background and the nurses from her facility were unable to access her chart when we called.     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    Hypermagnesemia 04/11/2024    POA, Mg 3.2  Daily chem       Morbid obesity     Necrotizing fasciitis      Past Surgical History:   Procedure Laterality Date    CLOSURE OF WOUND Right 2/22/2024    Procedure: CLOSURE, WOUND;  Surgeon: Steve Cole MD;  Location: [address];  Service: General;  Laterality: Right;    ESOPHAGOGASTRODUODENOSCOPY W/ PEG N/A 2/22/2024    Procedure: EGD, WITH PEG TUBE INSERTION;  Surgeon: Steve Cole MD;  Location: [address];  Service: General;  Laterality: N/A;    INCISION AND DRAINAGE OF PERIRECTAL REGION N/A 1/29/2024    Procedure: INCISION AND DRAINAGE, PERIRECTAL REGION;  Surgeon: Axel Ramsay MD;  Location: [facility] OR;  Service: General;  Laterality: N/A;    INSERTION OF TUNNELED CENTRAL VENOUS HEMODIALYSIS CATHETER Right 6/27/2024    Procedure: Insertion, Catheter, Central Venous, Hemodialysis;  Surgeon: Anthony Martinez MD;  Location: [facility] CATH LAB;  Service: Interventional Nephrology;  Laterality: Right;    LUMBAR PUNCTURE N/A  2/16/2024    Procedure: Lumbar Puncture;  Surgeon: Macy Boykin;  Location: [facility];  Service: Anesthesiology;  Laterality: N/A;    PLACEMENT, TRIALYSIS CATH Right 1/31/2024    Procedure: INSERTION, CATHETER, TRIPLE LUMEN, HEMODIALYSIS, TEMPORARY;  Surgeon: Alvin Junior MD;  Location: [facility] OR;  Service: General;  Laterality: Right;    REPLACEMENT OF WOUND VACUUM-ASSISTED CLOSURE DEVICE Right 2/12/2024    Procedure: REPLACEMENT, WOUND VAC;  Surgeon: Mundo Carmona MD;  [facility];  Service: General;  Laterality: Right;    REPLACEMENT OF WOUND VACUUM-ASSISTED CLOSURE DEVICE N/A 2/15/2024    Procedure: REPLACEMENT, WOUND VAC;  Surgeon: Steve Cole MD;  [facility];  Service: General;  Laterality: N/A;    REPLACEMENT OF WOUND VACUUM-ASSISTED CLOSURE DEVICE Right 2/19/2024    Procedure: REPLACEMENT, WOUND VAC;  Surgeon: Steve Cole MD;  [facility];  Service: General;  Laterality: Right;  RLE/groin    REPLACEMENT OF WOUND VACUUM-ASSISTED CLOSURE DEVICE Right 2/22/2024    Procedure: REPLACEMENT, WOUND VAC;  Surgeon: Steve Cole MD;  Location: [facility] OR [address] [place] [address];  Service: General;  Laterality: Right;    TRACHEOSTOMY N/A 2/22/2024    Procedure: CREATION, TRACHEOSTOMY;  Surgeon: Steve Cole MD;  [facility];  Service: General;  Laterality: N/A;    WOUND DEBRIDEMENT Bilateral 2/2/2024    Procedure: DEBRIDEMENT, WOUND;  Surgeon: Steve Cole MD;  [address];  Service: General;  Laterality: Bilateral;  Bilateral groin  Possible wound vac placement    WOUND DEBRIDEMENT Right 2/6/2024    Procedure: DEBRIDEMENT, WOUND, replace wound vac, possible closure;  Surgeon: Steve Cole MD;  [facility];  Service: General;  Laterality: Right;  RLE    WOUND DEBRIDEMENT Right 2/9/2024    Procedure: DEBRIDEMENT, WOUND w wound vac change;  Surgeon: Steve Cole MD;  Location: [facility] OR [address] [place] [address];  Service: General;  Laterality: Right;   RLE    WOUND DEBRIDEMENT Right 2/12/2024    Procedure: R thigh wound debridement;  Surgeon: Mundo Carmona MD;  [facility];  Service: General;  Laterality: Right;    WOUND EXPLORATION Right 1/31/2024    Procedure: IRRIGATION & DEBRIDEMENT, WOUND DEBRIDEMENT;  Surgeon: Alvin Junior MD;  Location: [facility] OR;  Service: General;  Laterality: Right;     No family history on file.  Social History     Tobacco Use    Smoking status: Unknown        Review of Systems   All other systems reviewed and are negative.      OBJECTIVE:   Vital Signs:       Physical Exam:    Vital signs: All nursing notes and vital signs reviewed -- afebrile, vital signs stable.  Constitutional: Patient lying in stretcher.   Skin: Exposed areas are intact without abnormal markings, rashes or other lesions.  HEENT: Normocephalic. Normal conjunctivae.  Cardiovascular: Normal rate and regular rhythm.  Respiratory: Chest wall rises and falls symmetrically, without signs of respiratory distress.  Abdomen: Soft and non-tender.  Extremities: Warm and without edema. Calves supple, non-tender.  Psych/Behavior: Normal affect.    Neurological:  E4 V1 M5   PERRL  Bilateral upper extremities: spontaneous, purposeful movement   Bilateral lower extremities: withdraws, anti-gravity     Diagnostic Results:  All imaging was independently reviewed by me.    CT cervical spine, dated 6/18/2024:  1. Severe segmental OPLL with severe canal narrowing.     ASSESSMENT/PLAN:     Sarha Saravia has a history of uncontrolled diabetes, end stage renal disease (on dialysis), and left-sided stroke and complete aphasia at baseline, who is referred to me by Jatin Hutchins, for evaluation of cervical stenosis.     Patient unable to provide history or participate in meaningful physical exam due to aphasia, so it is unclear what symptoms she is having, if any, from cervical stenosis. She is able to move her upper extremities with purposeful movement, despite  aphasia.     CT cervical spine with severe segmental OPLL and canal stenosis.     The patient understands and agrees with the plan of care. All questions were answered.     1. MRI cervical spine WO contrast.   2. Cervical collar to be worn at all times.   3. Return to clinic after MRI is completed.     I, Dr. Kristopher Ricci personally performed the services described in this documentation. All medical record entries made by the scribe, Hailey Gimenez, were at my direction and in my presence. I have reviewed the chart and agree that the record reflects my personal performance and is accurate and complete.      Kristopher Ricci M.D.  Department of Neurosurgery  [facility]

## 2024-08-06 ENCOUNTER — TELEPHONE (OUTPATIENT)
Dept: NEUROSURGERY | Facility: CLINIC | Age: 54
End: 2024-08-06
Payer: MEDICAID

## 2024-09-09 ENCOUNTER — TELEPHONE (OUTPATIENT)
Dept: NEUROSURGERY | Facility: CLINIC | Age: 54
End: 2024-09-09
Payer: MEDICAID

## 2024-09-09 DIAGNOSIS — M48.02 SPINAL STENOSIS, CERVICAL REGION: Primary | ICD-10-CM

## 2024-09-09 NOTE — TELEPHONE ENCOUNTER
Called patient's daughter to reschedule an upcoming appointment with Kristopher Ricci MD and provided next appointment date and time.  Future Appointments   Date Time Provider Department Center   10/31/2024  9:00 AM [facility] MRI [facility]   10/31/2024 10:30 AM Kristopher Ricci MD [facility] NEUROS8 [facility]        Daughter voiced understanding and thanked me.

## 2024-09-16 PROBLEM — A41.9 SEPSIS: Status: RESOLVED | Noted: 2024-01-29 | Resolved: 2024-09-16

## 2024-09-16 NOTE — PROGRESS NOTES
[facility] STAFF HEMODIALYSIS NOTE     Patient currently on hemodialysis for removal of uremic toxins and volume.     Patient seen and evaluated on hemodialysis, tolerating treatment, see HD flowsheet for vitals and assessments.    Labs have been reviewed and the dialysate bath has been adjusted.       Assessment/Plan:    -Tolerated capping trial well. Decannulated yesterday.   -Tunneled line exchanged yesterday + venogram   -Patient seen on HD, tolerating treatment well, w/o complaints   -UF goal of 1-1.5L  -Renal diet, if not NPO   -Strict I/O's and daily weights  -Daily renal function panels  -Keep MAP >65 while on HD   -Hgb goal 10-11, continue epo   -continue sevelamer   -Will continue to follow while inpatient     Delfina Shi DNP-FNP, C  Nephrology  Pager: [phone]         [FreeTextEntry1] : Pt presenting today for a f.u visit for RA. Reports symptoms are stable.  Last seen 05/2024. Chart reviewed since LV.  Currently taking cyclobenzaprine 10 mg daily prn for pain, gabapentin 300mg BID-TID, also using lidocaine cream. Tolerating medication well. No side effects.  On RTX 1000mg IV q 6 months. Last RTX infusion 07/2024.  Today with mild joint pain/swelling/stiffness to the joints.  denies fever, chills, denies chest pain, chest palpitations, denies sob, denies nausea, vomiting, abdominal pain, denies rashes.  ROS otherwise unchanged from LV.

## 2024-10-22 NOTE — SUBJECTIVE & OBJECTIVE
Interval History: no acute events overnight.  overnight    Review of patient's allergies indicates:  No Known Allergies  Current Facility-Administered Medications   Medication Frequency    0.9%  NaCl infusion (for blood administration) Q24H PRN    0.9%  NaCl infusion PRN    0.9%  NaCl infusion Once    acetaminophen tablet 650 mg Q4H PRN    albuterol-ipratropium 2.5 mg-0.5 mg/3 mL nebulizer solution 3 mL Q4H PRN    amLODIPine tablet 10 mg Daily    carvediloL tablet 25 mg BID    ceFEPIme (MAXIPIME) 2 g in dextrose 5 % in water (D5W) 100 mL IVPB (MB+) Q12H    dextrose 10 % infusion Continuous PRN    dextrose 10% bolus 125 mL 125 mL PRN    dextrose 10% bolus 250 mL 250 mL PRN    famotidine tablet 20 mg Daily    glucagon (human recombinant) injection 1 mg PRN    glucose chewable tablet 16 g PRN    glucose chewable tablet 24 g PRN    heparin (porcine) injection 7,500 Units Q8H    hydrALAZINE injection 10 mg Q4H PRN    insulin aspart U-100 pen 0-10 Units Q4H PRN    insulin aspart U-100 pen 6 Units Q4H    insulin detemir U-100 (Levemir) pen 14 Units Daily    labetalol 20 mg/4 mL (5 mg/mL) IV syring Q6H PRN    metronidazole IVPB 500 mg Q8H    naloxone 0.4 mg/mL injection 0.02 mg PRN    ondansetron injection 4 mg Q6H PRN    oxyCODONE 5 mg/5 mL solution 10 mg Q6H PRN    oxyCODONE 5 mg/5 mL solution 5 mg Q6H PRN    prochlorperazine injection Soln 5 mg Q6H PRN    psyllium husk (aspartame) 3.4 gram packet 1 packet BID    sevelamer carbonate pwpk 1.6 g TID WM    sodium chloride 0.9% bolus 250 mL 250 mL PRN    sodium chloride 0.9% bolus 250 mL 250 mL PRN    sodium chloride 0.9% flush 10 mL PRN       Objective:     Vital Signs (Most Recent):  Temp: 98.8 °F (37.1 °C) (02/14/24 0300)  Pulse: 94 (02/14/24 0747)  Resp: (!) 27 (02/14/24 0747)  BP: (!) 142/68 (02/14/24 0747)  SpO2: 99 % (02/14/24 0747) Vital Signs (24h Range):  Temp:  [98.6 °F (37 °C)-102.4 °F (39.1 °C)] 98.8 °F (37.1 °C)  Pulse:  [] 94  Resp:  [23-39]  27  SpO2:  [98 %-100 %] 99 %  BP: (117-176)/(56-78) 142/68     Weight: (!) 153.3 kg (338 lb) (02/08/24 0300)  Body mass index is 56.25 kg/m².  Body surface area is 2.65 meters squared.    I/O last 3 completed shifts:  In: 3060.9 [I.V.:331.8; NG/GT:1000; IV Piggyback:1729]  Out: 5097 [Urine:830; Other:3817; Stool:450]     Physical Exam  Vitals and nursing note reviewed.   Constitutional:       General: She is not in acute distress.     Appearance: She is obese. She is not ill-appearing or toxic-appearing.      Interventions: She is sedated and intubated.   Eyes:      General: No scleral icterus.        Right eye: No discharge.         Left eye: No discharge.   Cardiovascular:      Rate and Rhythm: Normal rate.   Pulmonary:      Effort: No respiratory distress. She is intubated.      Comments:       Abdominal:      General: There is distension.   Musculoskeletal:      Right lower leg: Edema present.      Left lower leg: Edema present.          Significant Labs:  All labs within the past 24 hours have been reviewed.     Significant Imaging:  Labs: Reviewed   22-Oct-2024

## 2024-10-30 ENCOUNTER — TELEPHONE (OUTPATIENT)
Dept: NEUROSURGERY | Facility: CLINIC | Age: 54
End: 2024-10-30
Payer: MEDICAID

## 2024-10-30 DIAGNOSIS — M48.02 SPINAL STENOSIS, CERVICAL REGION: Primary | ICD-10-CM

## 2024-10-31 ENCOUNTER — TELEPHONE (OUTPATIENT)
Dept: NEUROSURGERY | Facility: CLINIC | Age: 54
End: 2024-10-31
Payer: MEDICAID

## 2024-11-19 ENCOUNTER — HOSPITAL ENCOUNTER (EMERGENCY)
Facility: HOSPITAL | Age: 54
Discharge: NURSING, REHAB OR LONG-TERM CARE | End: 2024-11-19
Attending: STUDENT IN AN ORGANIZED HEALTH CARE EDUCATION/TRAINING PROGRAM
Payer: MEDICAID

## 2024-11-19 VITALS
RESPIRATION RATE: 18 BRPM | HEART RATE: 99 BPM | OXYGEN SATURATION: 100 % | SYSTOLIC BLOOD PRESSURE: 165 MMHG | WEIGHT: 232 LBS | TEMPERATURE: 99 F | HEIGHT: 67 IN | BODY MASS INDEX: 36.41 KG/M2 | DIASTOLIC BLOOD PRESSURE: 86 MMHG

## 2024-11-19 DIAGNOSIS — Z95.828 STATUS POST PICC CENTRAL LINE PLACEMENT: ICD-10-CM

## 2024-11-19 DIAGNOSIS — Z78.9 PROBLEM WITH VASCULAR ACCESS: Primary | ICD-10-CM

## 2024-11-19 PROCEDURE — 36410 VNPNXR 3YR/> PHY/QHP DX/THER: CPT | Mod: RT

## 2024-11-19 PROCEDURE — 36569 INSJ PICC 5 YR+ W/O IMAGING: CPT

## 2024-11-19 PROCEDURE — 63600175 PHARM REV CODE 636 W HCPCS: Performed by: STUDENT IN AN ORGANIZED HEALTH CARE EDUCATION/TRAINING PROGRAM

## 2024-11-19 PROCEDURE — 96365 THER/PROPH/DIAG IV INF INIT: CPT

## 2024-11-19 PROCEDURE — 99284 EMERGENCY DEPT VISIT MOD MDM: CPT | Mod: 25

## 2024-11-19 PROCEDURE — 25000003 PHARM REV CODE 250: Performed by: STUDENT IN AN ORGANIZED HEALTH CARE EDUCATION/TRAINING PROGRAM

## 2024-11-19 PROCEDURE — 96375 TX/PRO/DX INJ NEW DRUG ADDON: CPT

## 2024-11-19 RX ORDER — SODIUM CHLORIDE 0.9 % (FLUSH) 0.9 %
10 SYRINGE (ML) INJECTION EVERY 12 HOURS PRN
Status: DISCONTINUED | OUTPATIENT
Start: 2024-11-19 | End: 2024-11-19 | Stop reason: HOSPADM

## 2024-11-19 RX ORDER — CEFTRIAXONE 2 G/1
2 INJECTION, POWDER, FOR SOLUTION INTRAMUSCULAR; INTRAVENOUS
Status: COMPLETED | OUTPATIENT
Start: 2024-11-19 | End: 2024-11-19

## 2024-11-19 RX ADMIN — AMPICILLIN 2 G: 2 INJECTION, POWDER, FOR SOLUTION INTRAMUSCULAR; INTRAVENOUS at 04:11

## 2024-11-19 RX ADMIN — CEFTRIAXONE 2 G: 2 INJECTION, POWDER, FOR SOLUTION INTRAMUSCULAR; INTRAVENOUS at 04:11

## 2024-11-19 NOTE — CONSULTS
Single lumen 18G, 10CM midline placed in the right cephalic vein. Needle advanced into the vessel under real time ultrasound guidance. Image recorded and saved.    Max dwell date 12/18/24.  Lot number: [id number]

## 2024-11-19 NOTE — ED PROVIDER NOTES
Encounter Date: 11/19/2024       History     Chief Complaint   Patient presents with    Vascular Access Problem     Coming from [facility] with c/o accidental removal of PICC line. Per EMS staff unsure what she was getting through the PICC line. Unsure if patient is nonverbal. Patient alert but will not answer any questions.      53yo female, non-verbal at baseline, presents from her nursing home with PICC line dislodgement.  Patient was currently on Rocephin and ampicillin via her PICC line which from her discharge summary should be completed tomorrow.  Unable to obtain other history because she was nonverbal    Discussed with the nurse from nursing home who reports that the patient's last doses due on November 25th.  They just noticed that the PICC line has been pulled out, so they sent her to the ED.    The history is provided by the halfway. The history is limited by the condition of the patient.     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    Hypermagnesemia 04/11/2024    POA, Mg 3.2  Daily chem       Morbid obesity     Necrotizing fasciitis      Past Surgical History:   Procedure Laterality Date    CLOSURE OF WOUND Right 2/22/2024    Procedure: CLOSURE, WOUND;  Surgeon: Steve Cole MD;  Location: [facility] OR [address] [place] [address];  Service: General;  Laterality: Right;    ESOPHAGOGASTRODUODENOSCOPY W/ PEG N/A 2/22/2024    Procedure: EGD, WITH PEG TUBE INSERTION;  Surgeon: Steve Cole MD;  Location: [address];  Service: General;  Laterality: N/A;    INCISION AND DRAINAGE OF PERIRECTAL REGION N/A 1/29/2024    Procedure: INCISION AND DRAINAGE, PERIRECTAL REGION;  Surgeon: Axel Ramsay MD;  Location: [facility] OR;  Service: General;  Laterality: N/A;    INSERTION OF TUNNELED CENTRAL VENOUS HEMODIALYSIS CATHETER Right 6/27/2024    Procedure: Insertion, Catheter, Central Venous,  Hemodialysis;  Surgeon: Anthony Martinez MD;  Location: [facility] CATH LAB;  Service: Interventional Nephrology;  Laterality: Right;    LUMBAR PUNCTURE N/A 2/16/2024    Procedure: Lumbar Puncture;  Surgeon: Macy Boykin;  Location: [facility];  Service: Anesthesiology;  Laterality: N/A;    PLACEMENT, TRIALYSIS CATH Right 1/31/2024    Procedure: INSERTION, CATHETER, TRIPLE LUMEN, HEMODIALYSIS, TEMPORARY;  Surgeon: Alvin Junior MD;  Location: [facility] OR;  Service: General;  Laterality: Right;    REPLACEMENT OF WOUND VACUUM-ASSISTED CLOSURE DEVICE Right 2/12/2024    Procedure: REPLACEMENT, WOUND VAC;  Surgeon: Mundo Carmona MD;  Location: [facility] OR [facility];  Service: General;  Laterality: Right;    REPLACEMENT OF WOUND VACUUM-ASSISTED CLOSURE DEVICE N/A 2/15/2024    Procedure: REPLACEMENT, WOUND VAC;  Surgeon: Steve Cole MD;  [facility];  Service: General;  Laterality: N/A;    REPLACEMENT OF WOUND VACUUM-ASSISTED CLOSURE DEVICE Right 2/19/2024    Procedure: REPLACEMENT, WOUND VAC;  Surgeon: Steve Cole MD;  [address];  Service: General;  Laterality: Right;  RLE/groin    REPLACEMENT OF WOUND VACUUM-ASSISTED CLOSURE DEVICE Right 2/22/2024    Procedure: REPLACEMENT, WOUND VAC;  Surgeon: Steve Cole MD;  [facility];  Service: General;  Laterality: Right;    TRACHEOSTOMY N/A 2/22/2024    Procedure: CREATION, TRACHEOSTOMY;  Surgeon: Steve Cole MD;  [facility];  Service: General;  Laterality: N/A;    WOUND DEBRIDEMENT Bilateral 2/2/2024    Procedure: DEBRIDEMENT, WOUND;  Surgeon: Steve Cole MD;  [facility];  Service: General;  Laterality: Bilateral;  Bilateral groin  Possible wound vac placement    WOUND DEBRIDEMENT Right 2/6/2024    Procedure: DEBRIDEMENT, WOUND, replace wound vac, possible closure;  Surgeon: Steve Cole MD;  [facility];  Service: General;  Laterality: Right;  RLE    WOUND DEBRIDEMENT Right 2/9/2024     Procedure: DEBRIDEMENT, WOUND w wound vac change;  Surgeon: Steve Cole MD;  [facility];  Service: General;  Laterality: Right;  RLE    WOUND DEBRIDEMENT Right 2/12/2024    Procedure: R thigh wound debridement;  Surgeon: Mundo Carmona MD;  [facility];  Service: General;  Laterality: Right;    WOUND EXPLORATION Right 1/31/2024    Procedure: IRRIGATION & DEBRIDEMENT, WOUND DEBRIDEMENT;  Surgeon: Alvin Junior MD;  Location: [facility] OR;  Service: General;  Laterality: Right;     No family history on file.  Social History     Tobacco Use    Smoking status: Unknown   Substance Use Topics    Alcohol use: Not Currently     Review of Systems    Physical Exam     Initial Vitals [11/19/24 1338]   BP Pulse Resp Temp SpO2   (!) 144/84 84 16 98.8 °F (37.1 °C) 97 %      MAP       --         Physical Exam    Constitutional: Vital signs are normal. She appears well-developed and well-nourished.   HENT:   Head: Normocephalic and atraumatic.   Eyes: Conjunctivae are normal.   Cardiovascular:  Normal rate and intact distal pulses.           Dialysis port in right upper chest wall   Pulmonary/Chest: No respiratory distress.   No increased work of breathing and tachypnea   Abdominal: Abdomen is soft. She exhibits no distension. There is no abdominal tenderness. There is no rebound and no guarding.     Neurological: She is alert. GCS eye subscore is 4. GCS verbal subscore is 5. GCS motor subscore is 6.   Nonverbal.  Tracks with eyes   Skin: Skin is warm. Capillary refill takes less than 2 seconds.         ED Course   Procedures  Labs Reviewed - No data to display       Imaging Results    None          Medications   sodium chloride 0.9% flush 10 mL (has no administration in time range)   cefTRIAXone injection 2 g (2 g Intravenous Given 11/19/24 1644)   ampicillin (OMNIPEN) 2 g in 0.9% NaCl 100 mL IVPB (MB+) (0 g Intravenous Stopped 11/19/24 1758)     Medical Decision Making  Differential diagnoses  considered includes PICC line dislodgement, infection, clogged vascular access    Discussed the case with the midline team who will place a midline for us.  Patient given a dose of her Rocephin and ampicillin that were due today. Patient will be discharged at this time. Patient has been given home care instructions, follow up instructions, and strict return precautions. They agree with and are comfortable with the plan.     Amount and/or Complexity of Data Reviewed  External Data Reviewed: radiology.     Details: === Results for orders placed during the hospital encounter of 04/10/24 ===    Echo    - Interpretation Summary -  ·  Left Ventricle: The left ventricle is normal in size. Normal wall thickness. There is normal systolic function. Ejection fraction by visual approximation is 60%.  ·  Right Ventricle: Right ventricle was not well visualized due to poor acoustic window. Normal right ventricular cavity size.        Risk  Prescription drug management.  Diagnosis or treatment significantly limited by social determinants of health.                                      Clinical Impression:  Final diagnoses:  [Z95.828] Status post PICC central line placement  [Z78.9] Problem with vascular access (Primary)          ED Disposition Condition    Discharge Stable          ED Prescriptions    None       Follow-up Information       Follow up With Specialties Details Why Contact Info    Primary Care Physician  Schedule an appointment as soon as possible for a visit  As needed and to discuss recent ER visit     [facility] Emergency Dept Emergency Medicine Go to  As needed, If symptoms worsen [id number]  [facility] [id number] [phone]             Deion Smith MD  11/19/24 1631

## 2024-11-19 NOTE — ED NOTES
Sarah Saravia, an 54 y.o. female presents to the ED via EMS for complaints of vascular access problem. Patient was on antibiotics at [facility] and had an accidental removal of the PICC line. Patient is non verbal at baseline. Has mitts applied from the facility. No family at baseline. Per the facility, the patient's last day of antibiotics is tomorrow.       Chief Complaint   Patient presents with    Vascular Access Problem     Coming from [facility] with c/o accidental removal of PICC line. Per EMS staff unsure what she was getting through the PICC line. Unsure if patient is nonverbal. Patient alert but will not answer any questions.      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

## 2024-11-20 NOTE — ED NOTES
[facility] arrives to transport patient. Pt leaves facility with family via EMS stretcher w/ 2 transport personnel , all belongings sent with patient.

## 2024-11-20 NOTE — ED NOTES
Assumed care of patient at this time. Patient is resting comfortably in bed in lowest, locked position with bed rails raised x2 in NAD. Call light is within reach of patient. Patient denies further needs at this time.

## 2025-01-27 ENCOUNTER — HOSPITAL ENCOUNTER (EMERGENCY)
Facility: HOSPITAL | Age: 55
Discharge: HOME | End: 2025-01-27
Attending: EMERGENCY MEDICINE
Payer: MEDICAID

## 2025-01-27 VITALS
HEART RATE: 95 BPM | BODY MASS INDEX: 36.41 KG/M2 | DIASTOLIC BLOOD PRESSURE: 84 MMHG | TEMPERATURE: 98 F | OXYGEN SATURATION: 95 % | RESPIRATION RATE: 20 BRPM | SYSTOLIC BLOOD PRESSURE: 134 MMHG | HEIGHT: 67 IN | WEIGHT: 232 LBS

## 2025-01-27 DIAGNOSIS — K94.23 PEG TUBE MALFUNCTION: Primary | ICD-10-CM

## 2025-01-27 PROCEDURE — 99282 EMERGENCY DEPT VISIT SF MDM: CPT

## 2025-01-27 NOTE — ED TRIAGE NOTES
Sarah Saravia, a 54 y.o. female presents to the ED w/ complaint of PEG tube not working    Triage note:  Chief Complaint   Patient presents with    PEG Tube Issue     From 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

## 2025-01-27 NOTE — DISCHARGE INSTRUCTIONS
Diagnosis:  PEG tube block.      Please follow up with Gastroenterology referral as soon as possible for PEG tube exchange.      Please call [phone] to arrange gastroenterology follow up.    Tests today showed:   Labs Reviewed   HEPATITIS C ANTIBODY   HIV 1 / 2 ANTIBODY     No orders to display       Treatments you had today:   Medications - No data to display    Follow-Up Plan:  - Follow-up with primary care doctor within 3 - 5 days  - Additional testing and/or evaluation as directed by your primary doctor    Return to the Emergency Department for symptoms including but not limited to: worsening symptoms, shortness of breath or chest pain, vomiting with inability to hold down fluids, fevers greater than 100.4°F, passing out/fainting/unconsciousness, or other concerning symptoms.

## 2025-01-30 ENCOUNTER — HOSPITAL ENCOUNTER (OUTPATIENT)
Facility: HOSPITAL | Age: 55
Discharge: NURSING, REHAB OR LONG-TERM CARE | End: 2025-02-03
Attending: STUDENT IN AN ORGANIZED HEALTH CARE EDUCATION/TRAINING PROGRAM | Admitting: STUDENT IN AN ORGANIZED HEALTH CARE EDUCATION/TRAINING PROGRAM
Payer: MEDICAID

## 2025-01-30 DIAGNOSIS — R76.8 HEPATITIS C ANTIBODY TEST POSITIVE: ICD-10-CM

## 2025-01-30 DIAGNOSIS — K94.23 PEG TUBE MALFUNCTION: Primary | ICD-10-CM

## 2025-01-30 LAB — POCT GLUCOSE: 76 MG/DL (ref 70–110)

## 2025-01-30 PROCEDURE — G0378 HOSPITAL OBSERVATION PER HR: HCPCS

## 2025-01-30 PROCEDURE — 82962 GLUCOSE BLOOD TEST: CPT

## 2025-01-30 PROCEDURE — 63600175 PHARM REV CODE 636 W HCPCS: Performed by: HOSPITALIST

## 2025-01-30 PROCEDURE — 86803 HEPATITIS C AB TEST: CPT | Performed by: PHYSICIAN ASSISTANT

## 2025-01-30 PROCEDURE — 99285 EMERGENCY DEPT VISIT HI MDM: CPT | Mod: 25

## 2025-01-30 PROCEDURE — 83036 HEMOGLOBIN GLYCOSYLATED A1C: CPT | Performed by: HOSPITALIST

## 2025-01-30 PROCEDURE — 80048 BASIC METABOLIC PNL TOTAL CA: CPT | Performed by: HOSPITALIST

## 2025-01-30 PROCEDURE — 87389 HIV-1 AG W/HIV-1&-2 AB AG IA: CPT | Performed by: PHYSICIAN ASSISTANT

## 2025-01-30 PROCEDURE — 96372 THER/PROPH/DIAG INJ SC/IM: CPT | Performed by: HOSPITALIST

## 2025-01-30 PROCEDURE — 85025 COMPLETE CBC W/AUTO DIFF WBC: CPT | Performed by: HOSPITALIST

## 2025-01-30 RX ORDER — HEPARIN SODIUM 5000 [USP'U]/ML
5000 INJECTION, SOLUTION INTRAVENOUS; SUBCUTANEOUS EVERY 8 HOURS
Status: DISCONTINUED | OUTPATIENT
Start: 2025-01-30 | End: 2025-01-31

## 2025-01-30 RX ORDER — ONDANSETRON HYDROCHLORIDE 2 MG/ML
4 INJECTION, SOLUTION INTRAVENOUS EVERY 8 HOURS PRN
Status: DISCONTINUED | OUTPATIENT
Start: 2025-01-30 | End: 2025-02-03 | Stop reason: HOSPADM

## 2025-01-30 RX ORDER — NALOXONE HCL 0.4 MG/ML
0.02 VIAL (ML) INJECTION
Status: DISCONTINUED | OUTPATIENT
Start: 2025-01-30 | End: 2025-02-03 | Stop reason: HOSPADM

## 2025-01-30 RX ORDER — GLUCAGON 1 MG
1 KIT INJECTION
Status: DISCONTINUED | OUTPATIENT
Start: 2025-01-30 | End: 2025-02-03 | Stop reason: HOSPADM

## 2025-01-30 RX ORDER — ACETAMINOPHEN 325 MG/1
650 TABLET ORAL EVERY 4 HOURS PRN
Status: DISCONTINUED | OUTPATIENT
Start: 2025-01-30 | End: 2025-01-30

## 2025-01-30 RX ORDER — ACETAMINOPHEN 325 MG/1
650 TABLET ORAL EVERY 6 HOURS PRN
Status: DISCONTINUED | OUTPATIENT
Start: 2025-01-31 | End: 2025-02-03 | Stop reason: HOSPADM

## 2025-01-30 RX ORDER — IBUPROFEN 200 MG
24 TABLET ORAL
Status: DISCONTINUED | OUTPATIENT
Start: 2025-01-30 | End: 2025-02-03 | Stop reason: HOSPADM

## 2025-01-30 RX ORDER — TALC
6 POWDER (GRAM) TOPICAL NIGHTLY PRN
Status: DISCONTINUED | OUTPATIENT
Start: 2025-01-30 | End: 2025-01-30

## 2025-01-30 RX ORDER — PROCHLORPERAZINE EDISYLATE 5 MG/ML
5 INJECTION INTRAMUSCULAR; INTRAVENOUS EVERY 6 HOURS PRN
Status: DISCONTINUED | OUTPATIENT
Start: 2025-01-30 | End: 2025-02-03 | Stop reason: HOSPADM

## 2025-01-30 RX ORDER — SODIUM CHLORIDE 0.9 % (FLUSH) 0.9 %
10 SYRINGE (ML) INJECTION
Status: DISCONTINUED | OUTPATIENT
Start: 2025-01-30 | End: 2025-02-03 | Stop reason: HOSPADM

## 2025-01-30 RX ORDER — IBUPROFEN 200 MG
16 TABLET ORAL
Status: DISCONTINUED | OUTPATIENT
Start: 2025-01-30 | End: 2025-02-03 | Stop reason: HOSPADM

## 2025-01-30 RX ORDER — INSULIN ASPART 100 [IU]/ML
0-10 INJECTION, SOLUTION INTRAVENOUS; SUBCUTANEOUS EVERY 6 HOURS PRN
Status: DISCONTINUED | OUTPATIENT
Start: 2025-01-30 | End: 2025-02-03 | Stop reason: HOSPADM

## 2025-01-30 RX ADMIN — HEPARIN SODIUM 5000 UNITS: 5000 INJECTION INTRAVENOUS; SUBCUTANEOUS at 11:01

## 2025-01-30 NOTE — ED PROVIDER NOTES
Encounter Date: 1/30/2025       History     Chief Complaint   Patient presents with    Peg tube problem     From [facility] for peg tube problem, peg clogged and split not allowing meds/feeds to pass through.      55 y/o F with PMHx of CVA (now nonverbal), DM and obesity presents with complaints of clogged PEG tube. Pt sent from [facility] because her PEG tube is clogged and not allowing for meds or feeds to pass. Patient has a 20 Fr PEG placed in February 2024 -- she was seen with similar complaints in the ED 3 days ago. The PEG was flushed at that time and the access port was replaced. Patient was discharged back to her nursing home with instructions to follow up with outpatient GI for PEG tube replacement. Pt has no other complaints at this time.    The history is provided by the patient and medical records.     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    Hypermagnesemia 04/11/2024    POA, Mg 3.2  Daily chem       Morbid obesity     Necrotizing fasciitis      Past Surgical History:   Procedure Laterality Date    CLOSURE OF WOUND Right 2/22/2024    Procedure: CLOSURE, WOUND;  Surgeon: Steve Cole MD;  Location: [facility] OR [address] [place] [address];  Service: General;  Laterality: Right;    ESOPHAGOGASTRODUODENOSCOPY W/ PEG N/A 2/22/2024    Procedure: EGD, WITH PEG TUBE INSERTION;  Surgeon: Steve Cole MD;  Location: [address];  Service: General;  Laterality: N/A;    INCISION AND DRAINAGE OF PERIRECTAL REGION N/A 1/29/2024    Procedure: INCISION AND DRAINAGE, PERIRECTAL REGION;  Surgeon: Axel Ramsay MD;  Location: [facility] OR;  Service: General;  Laterality: N/A;    INSERTION OF TUNNELED CENTRAL VENOUS HEMODIALYSIS CATHETER Right 6/27/2024    Procedure: Insertion, Catheter, Central Venous, Hemodialysis;  Surgeon: Anthony Martinez MD;  Location: [facility] CATH LAB;  Service: Interventional Nephrology;   Laterality: Right;    LUMBAR PUNCTURE N/A 2/16/2024    Procedure: Lumbar Puncture;  Surgeon: Macy Boykin;  Location: [facility];  Service: Anesthesiology;  Laterality: N/A;    PLACEMENT, TRIALYSIS CATH Right 1/31/2024    Procedure: INSERTION, CATHETER, TRIPLE LUMEN, HEMODIALYSIS, TEMPORARY;  Surgeon: Alvin Junior MD;  Location: [facility] OR;  Service: General;  Laterality: Right;    REPLACEMENT OF WOUND VACUUM-ASSISTED CLOSURE DEVICE Right 2/12/2024    Procedure: REPLACEMENT, WOUND VAC;  Surgeon: Mundo Carmona MD;  [facility];  Service: General;  Laterality: Right;    REPLACEMENT OF WOUND VACUUM-ASSISTED CLOSURE DEVICE N/A 2/15/2024    Procedure: REPLACEMENT, WOUND VAC;  Surgeon: Steve Cole MD;  [facility];  Service: General;  Laterality: N/A;    REPLACEMENT OF WOUND VACUUM-ASSISTED CLOSURE DEVICE Right 2/19/2024    Procedure: REPLACEMENT, WOUND VAC;  Surgeon: Steve Cole MD;  [facility];  Service: General;  Laterality: Right;  RLE/groin    REPLACEMENT OF WOUND VACUUM-ASSISTED CLOSURE DEVICE Right 2/22/2024    Procedure: REPLACEMENT, WOUND VAC;  Surgeon: Steve Cole MD;  [address];  Service: General;  Laterality: Right;    TRACHEOSTOMY N/A 2/22/2024    Procedure: CREATION, TRACHEOSTOMY;  Surgeon: Steve Cole MD;  [facility];  Service: General;  Laterality: N/A;    WOUND DEBRIDEMENT Bilateral 2/2/2024    Procedure: DEBRIDEMENT, WOUND;  Surgeon: Steve Cole MD;  [address];  Service: General;  Laterality: Bilateral;  Bilateral groin  Possible wound vac placement    WOUND DEBRIDEMENT Right 2/6/2024    Procedure: DEBRIDEMENT, WOUND, replace wound vac, possible closure;  Surgeon: Steve Cole MD;  [address];  Service: General;  Laterality: Right;  RLE    WOUND DEBRIDEMENT Right 2/9/2024    Procedure: DEBRIDEMENT, WOUND w wound vac change;  Surgeon: Steve Cole MD;  Location: [facility] OR [facility];  Service: General;  Laterality: Right;  RLE    WOUND DEBRIDEMENT Right 2/12/2024    Procedure: R thigh wound debridement;  Surgeon: Mundo Carmona MD;  Location: [facility] OR [facility];  Service: General;  Laterality: Right;    WOUND EXPLORATION Right 1/31/2024    Procedure: IRRIGATION & DEBRIDEMENT, WOUND DEBRIDEMENT;  Surgeon: Alvin Junior MD;  Location: [facility] OR;  Service: General;  Laterality: Right;     No family history on file.  Social History     Tobacco Use    Smoking status: Unknown   Substance Use Topics    Alcohol use: Not Currently     Review of Systems   Constitutional: Negative.    HENT: Negative.     Eyes: Negative.    Respiratory: Negative.     Cardiovascular: Negative.    Gastrointestinal: Negative.    Endocrine: Negative.    Genitourinary: Negative.    Musculoskeletal: Negative.    Skin: Negative.    Allergic/Immunologic: Negative.    Neurological: Negative.    Hematological: Negative.    Psychiatric/Behavioral: Negative.         Physical Exam     Initial Vitals [01/30/25 1502]   BP Pulse Resp Temp SpO2   (!) 152/79 88 18 97.6 °F (36.4 °C) 96 %      MAP       --         Physical Exam    Nursing note and vitals reviewed.  Constitutional: She appears well-developed. No distress.   HENT:   Head: Normocephalic and atraumatic.   Eyes: EOM are normal. No scleral icterus.   Cardiovascular:  Normal rate.           Pulmonary/Chest: No respiratory distress.   Abdominal: Abdomen is soft. She exhibits no distension. There is no abdominal tenderness.   PEG tube in place, no drainage. Crushed pill residue noted clogging the PEG tube. The tube itself is split on its superior surface. See media tab for photos.     Neurological: She is alert.   Patient is nonverbal at baseline but is able to nod yes or no in response to questions. Following commands.   Skin: Skin is warm and dry.         ED Course   Procedures  Labs Reviewed   HEPATITIS C ANTIBODY   HIV 1 / 2 ANTIBODY   ISTAT CHEM8          Imaging Results               CT Abdomen Pelvis  Without Contrast (Final result)  Result time 01/30/25 20:47:24      Final result by Mundo Rouse MD (01/30/25 20:47:24)                   Impression:      Inflated gastrostomy tube balloon in the stomach lumen.    Other incidental findings discussed in the body of the report.      Electronically signed by: Mundo Rouse MD  Date:    01/30/2025  Time:    20:47               Narrative:    EXAMINATION:  CT ABDOMEN PELVIS WITHOUT CONTRAST    CLINICAL HISTORY:  Abdominal pain, acute, nonlocalized;Study requested by gen surg to evaluate clogged peg tube; would like to know if there is an inflated balloon present;    TECHNIQUE:  Low dose axial images, sagittal and coronal reformations were obtained from the lung bases to the pubic symphysis. Oral and IV contrast not administered.    COMPARISON:  CT, 06/18/2024.    FINDINGS:  Lower chest: Heart and lung bases are similar to prior exam.    Abdomen:    Evaluation of the solid abdominal organs and bowel is limited in the absence of IV contrast.    Liver and gallbladder stable.  Cholelithiasis versus biliary sludge or vicarious excretion of contrast in the gallbladder.  No pericholecystic inflammatory changes.  No obvious biliary duct dilatation.    Spleen, adrenals, and pancreas are stable and negative for acute finding.    Kidneys are symmetric.  No renal or ureteral stones. No hydronephrosis.    Percutaneous gastrostomy tube is present.  Inflated balloon noted in the stomach lumen.  Stomach is not overly distended.  Small hiatal hernia.  No bowel obstruction.  Mild stool burden in the colon.  Normal appendix.    No abdominal free fluid or pneumoperitoneum.    Abdominal aorta is stable in course and caliber with mild calcific atherosclerosis.    No bulky retroperitoneal lymphadenopathy.    Pelvis: Urinary bladder is decompressed and not well evaluated.  Rectum is mildly distended with stool.  Pelvic organs are unremarkable.  No  significant pelvic free fluid.    Bones and soft tissues: No aggressive osseous lesions. Degenerative changes in the spine.  Small fat containing umbilical hernia.  Scarring and edema versus small hematoma in the right groin soft tissues, though similar when compared with prior exam.  Probable mild muscular atrophy.  Mild body wall edema.                                       Medications - No data to display  Medical Decision Making  53 y/o F with PMHx of CVA (now nonverbal), DM and obesity presents with complaints of clogged PEG tube. On exam, the tube is clogged and split. Significant traction noted when attempting to remove the tube; if balloon is present it will need to be deflated prior to removal of the PEG. General surgery consulted, recommending CT Abd/pelvis confirms inflated gastrostomy tube balloon in the stomach lumen. GS recommending removal and replacement of PEG under fluoro with IR 2/2 possible adhesions. Case discussed with hospital medicine who will admit the patient to their service.      Amount and/or Complexity of Data Reviewed  External Data Reviewed: radiology and notes.  Labs: ordered. Decision-making details documented in ED Course.  Radiology: ordered. Decision-making details documented in ED Course.  Discussion of management or test interpretation with external provider(s): Case discussed with general surgery, recommending admission to  with PEG tube replacement under fluoro with IR. Case discussed with hospital medicine, patient will be admitted to their service.               ED Course as of 01/30/25 2100   Thu Jan 30, 2025   1807 54-year-old female with history of aphasia, G-tube dependent, had it placed in February 20, 2024, currently has a 20 Australian G-tube.  She was seen here 2 days ago after her G-tube got clogged and was missing a port so part of the port was replaced with the actual G-tube itself does not appear to has been replaced.  The tube was flushing at that time but it  looks like the whole half of the tube is either been cut in his missing all the ports.  The G-tube looks split in caked with food and meds.  The balloon is still likely inflated as there is significant traction when I try to take the tube out.  Will talk with general surgery to see about tricks for getting the tube deflated so that we can replace her G-tube. [NN]   1808 Patient is able to answer yes or no questions by shaking her head.  She has no other medical complaints.  No abdominal pain.  No vomiting.  She otherwise feels well. [NN]   1849 Discussed with general surgery.  They are recommending CT imaging to evaluate what type of tube this is and to confirm if this is a PEG tube and if there is a balloon inflated.  Will order without contrast given no concerns for intra-abdominal infection.  I-STAT ordered. [NN]      ED Course User Index  [NN] Marisa Mcneil MD                             Clinical Impression:  Final diagnoses:  [K94.23] PEG tube malfunction (Primary)                 Kylah Desai DO  Resident  01/30/25 6658

## 2025-01-30 NOTE — FIRST PROVIDER EVALUATION
Medical screening examination initiated.  I have conducted a focused provider triage encounter, findings are as follows:    Brief history of present illness:  Presents from nursing home with PEG tube cut.  20 [place] PEG tube was placed in the ER several days ago.    Vitals:    01/30/25 1502   BP: (!) 152/79   Pulse: 88   Resp: 18   Temp: 97.6 °F (36.4 °C)   TempSrc: Oral   SpO2: 96%       Pertinent physical exam:  Resting in NAD.  PEG tube cut    Brief workup plan:  20 [place] PEG tube ordered.    Preliminary workup initiated; this workup will be continued and followed by the physician or advanced practice provider that is assigned to the patient when roomed.

## 2025-01-31 LAB
ALBUMIN SERPL BCP-MCNC: 3.1 G/DL (ref 3.5–5.2)
ALP SERPL-CCNC: 130 U/L (ref 40–150)
ALT SERPL W/O P-5'-P-CCNC: 167 U/L (ref 10–44)
ANION GAP SERPL CALC-SCNC: 12 MMOL/L (ref 8–16)
ANION GAP SERPL CALC-SCNC: 15 MMOL/L (ref 8–16)
AST SERPL-CCNC: 128 U/L (ref 10–40)
BASOPHILS # BLD AUTO: 0.02 K/UL (ref 0–0.2)
BASOPHILS # BLD AUTO: 0.02 K/UL (ref 0–0.2)
BASOPHILS NFR BLD: 0.3 % (ref 0–1.9)
BASOPHILS NFR BLD: 0.3 % (ref 0–1.9)
BILIRUB SERPL-MCNC: 0.4 MG/DL (ref 0.1–1)
BUN SERPL-MCNC: 43 MG/DL (ref 6–20)
BUN SERPL-MCNC: 47 MG/DL (ref 6–20)
CALCIUM SERPL-MCNC: 10.7 MG/DL (ref 8.7–10.5)
CALCIUM SERPL-MCNC: 10.8 MG/DL (ref 8.7–10.5)
CHLORIDE SERPL-SCNC: 96 MMOL/L (ref 95–110)
CHLORIDE SERPL-SCNC: 98 MMOL/L (ref 95–110)
CO2 SERPL-SCNC: 24 MMOL/L (ref 23–29)
CO2 SERPL-SCNC: 26 MMOL/L (ref 23–29)
CREAT SERPL-MCNC: 5 MG/DL (ref 0.5–1.4)
CREAT SERPL-MCNC: 5.4 MG/DL (ref 0.5–1.4)
DIFFERENTIAL METHOD BLD: ABNORMAL
DIFFERENTIAL METHOD BLD: ABNORMAL
EOSINOPHIL # BLD AUTO: 0.2 K/UL (ref 0–0.5)
EOSINOPHIL # BLD AUTO: 0.2 K/UL (ref 0–0.5)
EOSINOPHIL NFR BLD: 3.7 % (ref 0–8)
EOSINOPHIL NFR BLD: 4 % (ref 0–8)
ERYTHROCYTE [DISTWIDTH] IN BLOOD BY AUTOMATED COUNT: 16 % (ref 11.5–14.5)
ERYTHROCYTE [DISTWIDTH] IN BLOOD BY AUTOMATED COUNT: 16.1 % (ref 11.5–14.5)
EST. GFR  (NO RACE VARIABLE): 8.8 ML/MIN/1.73 M^2
EST. GFR  (NO RACE VARIABLE): 9.7 ML/MIN/1.73 M^2
ESTIMATED AVG GLUCOSE: 111 MG/DL (ref 68–131)
GLUCOSE SERPL-MCNC: 65 MG/DL (ref 70–110)
GLUCOSE SERPL-MCNC: 71 MG/DL (ref 70–110)
HBA1C MFR BLD: 5.5 % (ref 4–5.6)
HCT VFR BLD AUTO: 38.8 % (ref 37–48.5)
HCT VFR BLD AUTO: 41.6 % (ref 37–48.5)
HCV AB SERPL QL IA: REACTIVE
HGB BLD-MCNC: 12.3 G/DL (ref 12–16)
HGB BLD-MCNC: 13.2 G/DL (ref 12–16)
HIV 1+2 AB+HIV1 P24 AG SERPL QL IA: NORMAL
IMM GRANULOCYTES # BLD AUTO: 0.02 K/UL (ref 0–0.04)
IMM GRANULOCYTES # BLD AUTO: 0.02 K/UL (ref 0–0.04)
IMM GRANULOCYTES NFR BLD AUTO: 0.3 % (ref 0–0.5)
IMM GRANULOCYTES NFR BLD AUTO: 0.3 % (ref 0–0.5)
INR PPP: 1 (ref 0.8–1.2)
LYMPHOCYTES # BLD AUTO: 1.7 K/UL (ref 1–4.8)
LYMPHOCYTES # BLD AUTO: 1.9 K/UL (ref 1–4.8)
LYMPHOCYTES NFR BLD: 27.7 % (ref 18–48)
LYMPHOCYTES NFR BLD: 31.4 % (ref 18–48)
MCH RBC QN AUTO: 26.8 PG (ref 27–31)
MCH RBC QN AUTO: 27.2 PG (ref 27–31)
MCHC RBC AUTO-ENTMCNC: 31.7 G/DL (ref 32–36)
MCHC RBC AUTO-ENTMCNC: 31.7 G/DL (ref 32–36)
MCV RBC AUTO: 85 FL (ref 82–98)
MCV RBC AUTO: 86 FL (ref 82–98)
MONOCYTES # BLD AUTO: 0.8 K/UL (ref 0.3–1)
MONOCYTES # BLD AUTO: 0.8 K/UL (ref 0.3–1)
MONOCYTES NFR BLD: 12.8 % (ref 4–15)
MONOCYTES NFR BLD: 13.4 % (ref 4–15)
NEUTROPHILS # BLD AUTO: 3.2 K/UL (ref 1.8–7.7)
NEUTROPHILS # BLD AUTO: 3.2 K/UL (ref 1.8–7.7)
NEUTROPHILS NFR BLD: 51.5 % (ref 38–73)
NEUTROPHILS NFR BLD: 54.3 % (ref 38–73)
NRBC BLD-RTO: 0 /100 WBC
NRBC BLD-RTO: 0 /100 WBC
PHOSPHATE SERPL-MCNC: 5.5 MG/DL (ref 2.7–4.5)
PLATELET # BLD AUTO: 213 K/UL (ref 150–450)
PLATELET # BLD AUTO: 217 K/UL (ref 150–450)
PMV BLD AUTO: 10 FL (ref 9.2–12.9)
PMV BLD AUTO: 10.4 FL (ref 9.2–12.9)
POCT GLUCOSE: 71 MG/DL (ref 70–110)
POCT GLUCOSE: 78 MG/DL (ref 70–110)
POCT GLUCOSE: 84 MG/DL (ref 70–110)
POCT GLUCOSE: 99 MG/DL (ref 70–110)
POTASSIUM SERPL-SCNC: 3.5 MMOL/L (ref 3.5–5.1)
POTASSIUM SERPL-SCNC: 3.6 MMOL/L (ref 3.5–5.1)
PROT SERPL-MCNC: 8.4 G/DL (ref 6–8.4)
PROTHROMBIN TIME: 11.4 SEC (ref 9–12.5)
RBC # BLD AUTO: 4.53 M/UL (ref 4–5.4)
RBC # BLD AUTO: 4.92 M/UL (ref 4–5.4)
SODIUM SERPL-SCNC: 134 MMOL/L (ref 136–145)
SODIUM SERPL-SCNC: 137 MMOL/L (ref 136–145)
WBC # BLD AUTO: 5.96 K/UL (ref 3.9–12.7)
WBC # BLD AUTO: 6.17 K/UL (ref 3.9–12.7)

## 2025-01-31 PROCEDURE — 99214 OFFICE O/P EST MOD 30 MIN: CPT | Mod: ,,,

## 2025-01-31 PROCEDURE — G0378 HOSPITAL OBSERVATION PER HR: HCPCS

## 2025-01-31 PROCEDURE — 25000003 PHARM REV CODE 250: Performed by: HOSPITALIST

## 2025-01-31 PROCEDURE — 84100 ASSAY OF PHOSPHORUS: CPT | Performed by: HOSPITALIST

## 2025-01-31 PROCEDURE — 99223 1ST HOSP IP/OBS HIGH 75: CPT | Mod: 25,,, | Performed by: PHYSICIAN ASSISTANT

## 2025-01-31 PROCEDURE — 36415 COLL VENOUS BLD VENIPUNCTURE: CPT | Performed by: STUDENT IN AN ORGANIZED HEALTH CARE EDUCATION/TRAINING PROGRAM

## 2025-01-31 PROCEDURE — 80053 COMPREHEN METABOLIC PANEL: CPT | Performed by: HOSPITALIST

## 2025-01-31 PROCEDURE — 85025 COMPLETE CBC W/AUTO DIFF WBC: CPT | Performed by: HOSPITALIST

## 2025-01-31 PROCEDURE — 63600175 PHARM REV CODE 636 W HCPCS: Performed by: STUDENT IN AN ORGANIZED HEALTH CARE EDUCATION/TRAINING PROGRAM

## 2025-01-31 PROCEDURE — 96372 THER/PROPH/DIAG INJ SC/IM: CPT | Performed by: STUDENT IN AN ORGANIZED HEALTH CARE EDUCATION/TRAINING PROGRAM

## 2025-01-31 PROCEDURE — 36415 COLL VENOUS BLD VENIPUNCTURE: CPT | Performed by: HOSPITALIST

## 2025-01-31 PROCEDURE — 25500020 PHARM REV CODE 255: Performed by: STUDENT IN AN ORGANIZED HEALTH CARE EDUCATION/TRAINING PROGRAM

## 2025-01-31 PROCEDURE — 25000003 PHARM REV CODE 250: Performed by: STUDENT IN AN ORGANIZED HEALTH CARE EDUCATION/TRAINING PROGRAM

## 2025-01-31 PROCEDURE — 85610 PROTHROMBIN TIME: CPT | Performed by: STUDENT IN AN ORGANIZED HEALTH CARE EDUCATION/TRAINING PROGRAM

## 2025-01-31 RX ORDER — NAPROXEN SODIUM 220 MG/1
81 TABLET, FILM COATED ORAL DAILY
Status: DISCONTINUED | OUTPATIENT
Start: 2025-01-31 | End: 2025-02-03 | Stop reason: HOSPADM

## 2025-01-31 RX ORDER — ATORVASTATIN CALCIUM 40 MG/1
40 TABLET, FILM COATED ORAL DAILY
Status: DISCONTINUED | OUTPATIENT
Start: 2025-01-31 | End: 2025-01-31

## 2025-01-31 RX ORDER — INSULIN GLARGINE 100 [IU]/ML
6 INJECTION, SOLUTION SUBCUTANEOUS NIGHTLY
Status: DISCONTINUED | OUTPATIENT
Start: 2025-01-31 | End: 2025-02-03 | Stop reason: HOSPADM

## 2025-01-31 RX ORDER — METOPROLOL TARTRATE 25 MG/1
25 TABLET, FILM COATED ORAL 2 TIMES DAILY
Status: DISCONTINUED | OUTPATIENT
Start: 2025-01-31 | End: 2025-02-03 | Stop reason: HOSPADM

## 2025-01-31 RX ADMIN — ACETAMINOPHEN 650 MG: 325 TABLET ORAL at 02:01

## 2025-01-31 RX ADMIN — INSULIN GLARGINE 6 UNITS: 100 INJECTION, SOLUTION SUBCUTANEOUS at 08:01

## 2025-01-31 RX ADMIN — IOHEXOL 20 ML: 300 INJECTION, SOLUTION INTRAVENOUS at 12:01

## 2025-01-31 RX ADMIN — ASPIRIN 81 MG CHEWABLE TABLET 81 MG: 81 TABLET CHEWABLE at 06:01

## 2025-01-31 RX ADMIN — METOPROLOL TARTRATE 25 MG: 25 TABLET, FILM COATED ORAL at 08:01

## 2025-01-31 NOTE — PLAN OF CARE
NURSING HOME ORDERS    02/03/2025  [facility]  MARGARET SILVA - OBSERVATION 11H  [address]  [facility] [address]  Dept: [phone]  Loc: [phone]     Admit to Nursing Home:      Diagnoses:  Active Hospital Problems    Diagnosis  POA    *PEG tube malfunction [K94.23]  Yes    Transaminitis [R74.01]  Yes    ESRD (end stage renal disease) on dialysis [N18.6, Z99.2]  Not Applicable    History of ischemic multifocal multiple vascular territories stroke [Z86.73]  Not Applicable    Type 2 diabetes mellitus with hyperglycemia, with long-term current use of insulin [E11.65, Z79.4]  Not Applicable      Resolved Hospital Problems   No resolved problems to display.       Patient is homebound due to:  PEG tube malfunction    Allergies:Review of patient's allergies indicates:  No Known Allergies    Vitals:  Routine    Diet:  NPO ,   tube feeds: Nepro Carb Steady (nut.tx.imp.renal fxn,lac-reduc) liquid; 0.08 gram-1.8 kcal/mL; amt: 55 ML/HR; oral  Flush Tube Feeding with at least 150mL H2O. Flush before and after all  medications given unless specifically specified by MD differently.   FWF 100cc bid      Activities:   Activity as tolerated    Goals of Care Treatment Preferences:  Code Status: Full Code          What is most important right now is to focus on curative/life-prolongation (regardless of treatment burdens).  Accordingly, we have decided that the best plan to meet the patient's goals includes continuing with treatment.      Labs:  n/a    Nursing Precautions:  Aspiration , Fall, Seizure, and Pressure ulcer prevention    Consults:   Wound Care and Nutrition to evaluate and recommend diet     Miscellaneous Care: PEG Care:  Clean site every 24 hours  Routine Skin for Bedridden Patients:  Apply moisture barrier cream to all  Wound Care: yes:     Right Buttock: Clean with wound cleanser. Apply betadine, triad and a foam  dressing 3 x week and prn until resolved.    Right groin: Clean with wound  "cleanser. Apply betadine, triad and a foam  dressing 3 x week and prn until resolved.    Clean PEG site with soap and water, rinse well, pat dry, and leave open to air.                  Diabetes Care:  SN to perform and educate Diabetic management with blood glucose monitoring: and Fingerstick blood sugar every 6 hours       Medications: Discontinue all previous medication orders, if any. See new list below.     Medication List        CONTINUE taking these medications      aspirin 81 MG Chew  1 tablet (81 mg total) by Per G Tube route once daily.     atorvastatin 40 MG tablet  Commonly known as: LIPITOR  1 tablet (40 mg total) by Per G Tube route once daily.     insulin aspart U-100 100 unit/mL (3 mL) Inpn pen  Commonly known as: NovoLOG  Inject 0-5 Units into the skin every 6 (six) hours as needed (Hyperglycemia). **LOW CORRECTION DOSE**  Blood Glucose  mg/dL                         151-200                0 unit  201-250                2 units  251-300                3 units  301-350                4 units  >350                     5 units  Administer subcutaneously if needed at times designated by monitoring schedule.   DO NOT HOLD correction dose insulin for patients who are  NPO.  "HIGH ALERT MEDICATION" - Administer with meals or TF/TPN.     insulin glargine U-100 (Lantus) 100 unit/mL (3 mL) Inpn pen  Inject 13 Units into the skin every evening.     metoprolol tartrate 25 MG tablet  Commonly known as: LOPRESSOR  1 tablet (25 mg total) by Per G Tube route 2 (two) times daily.     pantoprazole 40 mg suspension  Commonly known as: PROTONIX  1 packet (40 mg total) by Per G Tube route once daily.     polyethylene glycol 17 gram Pwpk  Commonly known as: GLYCOLAX  17 g by Per G Tube route 2 (two) times daily as needed for Constipation.     sodium bicarbonate 650 MG tablet  1 tablet (650 mg total) by Per G Tube route 3 (three) times daily.     VITAMIN C 500 MG tablet  Generic drug: ascorbic acid (vitamin C)  500 mg by " Per G Tube route 2 (two) times daily.                Immunizations Administered as of 2/3/2025       No immunizations on file.              _________________________________  Tianna Bro MD  02/03/2025

## 2025-01-31 NOTE — SUBJECTIVE & OBJECTIVE
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    Hypermagnesemia 04/11/2024    POA, Mg 3.2  Daily chem       Morbid obesity     Necrotizing fasciitis        Past Surgical History:   Procedure Laterality Date    CLOSURE OF WOUND Right 2/22/2024    Procedure: CLOSURE, WOUND;  Surgeon: Steve Cole MD;  Location: [address];  Service: General;  Laterality: Right;    ESOPHAGOGASTRODUODENOSCOPY W/ PEG N/A 2/22/2024    Procedure: EGD, WITH PEG TUBE INSERTION;  Surgeon: Steve Cole MD;  Location: [address];  Service: General;  Laterality: N/A;    INCISION AND DRAINAGE OF PERIRECTAL REGION N/A 1/29/2024    Procedure: INCISION AND DRAINAGE, PERIRECTAL REGION;  Surgeon: Axel Ramsay MD;  Location: [facility] OR;  Service: General;  Laterality: N/A;    INSERTION OF TUNNELED CENTRAL VENOUS HEMODIALYSIS CATHETER Right 6/27/2024    Procedure: Insertion, Catheter, Central Venous, Hemodialysis;  Surgeon: Anthony Martinez MD;  Location: [facility] CATH LAB;  Service: Interventional Nephrology;  Laterality: Right;    LUMBAR PUNCTURE N/A 2/16/2024    Procedure: Lumbar Puncture;  Surgeon: Macy Boykin;  Location: [facility];  Service: Anesthesiology;  Laterality: N/A;    PLACEMENT, TRIALYSIS CATH Right 1/31/2024    Procedure: INSERTION, CATHETER, TRIPLE LUMEN, HEMODIALYSIS, TEMPORARY;  Surgeon: Alvin Junior MD;  Location: [facility] OR;  Service: General;  Laterality: Right;    REPLACEMENT OF WOUND VACUUM-ASSISTED CLOSURE DEVICE Right 2/12/2024    Procedure: REPLACEMENT, WOUND VAC;  Surgeon: Mundo Carmona MD;  Location: [address];  Service: General;  Laterality: Right;    REPLACEMENT OF WOUND VACUUM-ASSISTED CLOSURE DEVICE N/A 2/15/2024    Procedure: REPLACEMENT, WOUND VAC;  Surgeon: Steve Cole MD;  Location: [facility] OR [address] [place] [address];  Service: General;  Laterality: N/A;    REPLACEMENT OF WOUND VACUUM-ASSISTED CLOSURE DEVICE Right 2/19/2024    Procedure:  REPLACEMENT, WOUND VAC;  Surgeon: Steve Cole MD;  [facility];  Service: General;  Laterality: Right;  RLE/groin    REPLACEMENT OF WOUND VACUUM-ASSISTED CLOSURE DEVICE Right 2/22/2024    Procedure: REPLACEMENT, WOUND VAC;  Surgeon: Steve Cole MD;  [facility];  Service: General;  Laterality: Right;    TRACHEOSTOMY N/A 2/22/2024    Procedure: CREATION, TRACHEOSTOMY;  Surgeon: Steve Cole MD;  [facility];  Service: General;  Laterality: N/A;    WOUND DEBRIDEMENT Bilateral 2/2/2024    Procedure: DEBRIDEMENT, WOUND;  Surgeon: Steve Cole MD;  Location[facility];  Service: General;  Laterality: Bilateral;  Bilateral groin  Possible wound vac placement    WOUND DEBRIDEMENT Right 2/6/2024    Procedure: DEBRIDEMENT, WOUND, replace wound vac, possible closure;  Surgeon: Steve Cole MD;  [facility]: [facility];  Service: General;  Laterality: Right;  RLE    WOUND DEBRIDEMENT Right 2/9/2024    Procedure: DEBRIDEMENT, WOUND w wound vac change;  Surgeon: Steve Cole MD;  Location: [facility];  Service: General;  Laterality: Right;  RLE    WOUND DEBRIDEMENT Right 2/12/2024    Procedure: R thigh wound debridement;  Surgeon: Mundo Carmona MD;  Location: [facility] OR [address] [place] [address];  Service: General;  Laterality: Right;    WOUND EXPLORATION Right 1/31/2024    Procedure: IRRIGATION & DEBRIDEMENT, WOUND DEBRIDEMENT;  Surgeon: Alvin Junior MD;  Location: [facility] OR;  Service: General;  Laterality: Right;       Review of patient's allergies indicates:  No Known Allergies    No current facility-administered medications on file prior to encounter.     Current Outpatient Medications on File Prior to Encounter   Medication Sig    ascorbic acid, vitamin C, (VITAMIN C) 500 MG tablet 500 mg by Per G Tube route 2 (two) times daily.    aspirin 81 MG Chew 1 tablet (81 mg total) by Per G Tube route once daily.    atorvastatin (LIPITOR) 40 MG tablet 1 tablet (40 mg total) by  "Per G Tube route once daily.    insulin aspart U-100 (NOVOLOG) 100 unit/mL (3 mL) InPn pen Inject 0-5 Units into the skin every 6 (six) hours as needed (Hyperglycemia). **LOW CORRECTION DOSE**  Blood Glucose  mg/dL                         151-200                0 unit  201-250                2 units  251-300                3 units  301-350                4 units  >350                     5 units  Administer subcutaneously if needed at times designated by monitoring schedule.   DO NOT HOLD correction dose insulin for patients who are  NPO.  "HIGH ALERT MEDICATION" - Administer with meals or TF/TPN.    insulin glargine U-100, Lantus, 100 unit/mL (3 mL) SubQ InPn pen Inject 13 Units into the skin every evening.    metoprolol tartrate (LOPRESSOR) 25 MG tablet 1 tablet (25 mg total) by Per G Tube route 2 (two) times daily.    pantoprazole (PROTONIX) 40 mg suspension 1 packet (40 mg total) by Per G Tube route once daily.    polyethylene glycol (GLYCOLAX) 17 gram PwPk 17 g by Per G Tube route 2 (two) times daily as needed for Constipation.    sodium bicarbonate 650 MG tablet 1 tablet (650 mg total) by Per G Tube route 3 (three) times daily.     Family History    None       Tobacco Use    Smoking status: Unknown    Smokeless tobacco: Not on file   Substance and Sexual Activity    Alcohol use: Not Currently    Drug use: Not on file    Sexual activity: Not on file     Review of Systems   Unable to perform ROS: Patient nonverbal     Objective:     Vital Signs (Most Recent):  Temp: 98.4 °F (36.9 °C) (01/30/25 2113)  Pulse: 95 (01/30/25 2113)  Resp: 18 (01/30/25 2113)  BP: 123/84 (01/30/25 2113)  SpO2: 98 % (01/30/25 2113) Vital Signs (24h Range):  Temp:  [97.6 °F (36.4 °C)-98.4 °F (36.9 °C)] 98.4 °F (36.9 °C)  Pulse:  [88-95] 95  Resp:  [18] 18  SpO2:  [96 %-98 %] 98 %  BP: (123-152)/(79-84) 123/84        There is no height or weight on file to calculate BMI.     Physical Exam  Vitals reviewed.   Constitutional:       General: " She is not in acute distress.     Appearance: She is well-developed.   HENT:      Head: Normocephalic and atraumatic.   Eyes:      Extraocular Movements: Extraocular movements intact.      Pupils: Pupils are equal, round, and reactive to light.   Neck:      Vascular: No JVD.      Trachea: No tracheal deviation.   Cardiovascular:      Rate and Rhythm: Normal rate and regular rhythm.      Heart sounds: No murmur heard.     No friction rub. No gallop.   Pulmonary:      Effort: No respiratory distress.      Breath sounds: Normal breath sounds. No wheezing or rales.   Abdominal:      General: Bowel sounds are normal. There is no distension.      Palpations: Abdomen is soft. There is no mass.      Tenderness: There is no abdominal tenderness.      Comments: Peg tube in place, no purulence noted    Musculoskeletal:         General: No deformity.      Cervical back: Neck supple.   Lymphadenopathy:      Cervical: No cervical adenopathy.   Skin:     General: Skin is warm and dry.      Findings: No rash.   Neurological:      Mental Status: She is alert. Mental status is at baseline.      Comments: Follows commands, nods yes/no              CRANIAL NERVES     CN III, IV, VI   Pupils are equal, round, and reactive to light.       Significant Labs: All pertinent labs within the past 24 hours have been reviewed.    Significant Imaging: I have reviewed all pertinent imaging results/findings within the past 24 hours.

## 2025-01-31 NOTE — ASSESSMENT & PLAN NOTE
-A1c pending. Pt. To remain NPO until PEG tube placement  -SSI for NPO patients ordered while admitted, monitor BG and add basal if needed

## 2025-01-31 NOTE — PLAN OF CARE
Problem: Skin Injury Risk Increased  Goal: Skin Health and Integrity  Outcome: Progressing     Problem: Adult Inpatient Plan of Care  Goal: Plan of Care Review  Outcome: Progressing  Goal: Patient-Specific Goal (Individualized)  Outcome: Progressing  Goal: Absence of Hospital-Acquired Illness or Injury  Outcome: Progressing  Goal: Optimal Comfort and Wellbeing  Outcome: Progressing  Goal: Readiness for Transition of Care  Outcome: Progressing     Problem: Diabetes Comorbidity  Goal: Blood Glucose Level Within Targeted Range  Outcome: Progressing     Problem: Infection  Goal: Absence of Infection Signs and Symptoms  Outcome: Progressing     Problem: Wound  Goal: Optimal Coping  Outcome: Progressing  Goal: Optimal Functional Ability  Outcome: Progressing  Goal: Absence of Infection Signs and Symptoms  Outcome: Progressing  Goal: Improved Oral Intake  Outcome: Progressing  Goal: Optimal Pain Control and Function  Outcome: Progressing  Goal: Skin Health and Integrity  Outcome: Progressing  Goal: Optimal Wound Healing  Outcome: Progressing     Problem: Hemodialysis  Goal: Safe, Effective Therapy Delivery  Outcome: Progressing  Goal: Effective Tissue Perfusion  Outcome: Progressing  Goal: Absence of Infection Signs and Symptoms  Outcome: Progressing     Problem: Chronic Kidney Disease  Goal: Optimal Coping with Chronic Illness  Outcome: Progressing  Goal: Electrolyte Balance  Outcome: Progressing  Goal: Fluid Balance  Outcome: Progressing  Goal: Optimal Functional Ability  Outcome: Progressing  Goal: Absence of Anemia Signs and Symptoms  Outcome: Progressing  Goal: Optimal Oral Intake  Outcome: Progressing  Goal: Acceptable Pain Control  Outcome: Progressing  Goal: Minimize Renal Failure Effects  Outcome: Progressing

## 2025-01-31 NOTE — ASSESSMENT & PLAN NOTE
-PEG tube with issue of recurrent clogging. Evaluated by general surgery who recommend replacement due to length of tube too short after being cut, now causing recurrent issues  -Pt. NPO, IR consulted for replacement

## 2025-01-31 NOTE — ASSESSMENT & PLAN NOTE
54 year old female hx of ESRD on HD MWF presenting for clogged PEG tube. Nephrology consulted for HD management.     Nephrology History  -Outpatient HD unit: [facility]  -Nephrologist: Yang  -HD tx days: MWF  -HD tx time: 4 hrs  -Last HD tx: 1/29/25 ?  -HD access: R TDC  -HD modality: iHD  -Residual urine: ?  -EDW:  107.5 kg    Plan/Recommendations  ESRD on HD:  -No need for emergent RRT, electrolytes and volume status stable. Due to high volume of dialysis patients, need to triage patients, will plan for HD tomorrow morning. Would not recommend discharge prior to patient receiving HD unless patient is able to be scheduled to receive HD at outpatient dialysis unit tomorrow.  -Will continue iHD thrice weekly unless emergent indication arises  -Strict I&Os  -Pre & post HD weight  Anemia in ESRD  -Hgb goal 10-11, hgb 13.2, above goal, no Epo  Mineral Bone Disease in ESRD  -Not currently on binders, management per OP unit  -Ca chronically high, likely 2/2 immobility, PTH low, management per OP unit  -Renal diet if not NPO

## 2025-01-31 NOTE — PROCEDURES
Interventional Radiology      Pre Op Diagnosis: Dysphagia    Post Op Diagnosis: Same    Procedure: Gastrostomy Tube Placement    Procedure performed by:  Yadira Curry MD    Written Informed Consent Obtained: Yes  Specimen Removed: NO  Estimated Blood Loss: Minimal    Findings:     Moderate Sedation      Successful placement of 22 Fr STANLEY Gastrostomy tube    Patient tolerated procedure well.     Recommendations  G tube ready for use immediately    Tube Care instructions:  Flush tube with 20 mL warm water before & after each feed or medication administrations   Avoid administering whole pills through tube   Clean skin & tube daily with mild soap and water. Avoid topical antibacterials as they promote fungus growth    For tube clogging, try the following solutions before reaching out to IR:  Flushing w 50 cc warm water (opens tube 40% of the time)  Instill pancreatic enzymes  Use biopsy brush to clean the tube (can be obtained from endoscopy department)     For minor leaks, slightly tighten the bolster to the skin    For skin irritation and excoriation, consider using saline soaks or zinc oxide and keep skin open to air.    DO NOT cut the G tube if it has malfunction      Jatin Hutchins MD  Department of Radiology R-1

## 2025-01-31 NOTE — DISCHARGE INSTRUCTIONS
Tube Care instructions:  Flush tube with 20 mL warm water before & after each feed or medication administrations   Avoid administering whole pills through tube   Clean skin & tube daily with mild soap and water. Avoid topical antibacterials as they promote fungus growth     For tube clogging, try the following solutions before reaching out to IR:  Flushing w 50 cc warm water (opens tube 40% of the time)  Instill pancreatic enzymes  Use biopsy brush to clean the tube (can be obtained from endoscopy department)      For minor leaks, slightly tighten the bolster to the skin     For skin irritation and excoriation, consider using saline soaks or zinc oxide and keep skin open to air.     DO NOT cut the G tube if it has malfunction

## 2025-01-31 NOTE — H&P
[facility] - Observation [address] [place] [address] Medicine  History & Physical    Patient Name: Sarah Saravia  MRN: [id number]  Patient Class: OP- Observation  Admission Date: 1/30/2025  Attending Physician: Tianna Bro MD   Primary Care Provider: Deanna Primary Doctor         Patient information was obtained from patient, past medical records, and ER records.     Subjective:     Principal Problem:PEG tube malfunction    Chief Complaint:   Chief Complaint   Patient presents with    Peg tube problem     From [facility] for peg tube problem, peg clogged and split not allowing meds/feeds to pass through.         HPI: 55 yo F with PMHx CVA resulting in nonverbal status with PEG, DM2, and ESRD on HD MWF who presents to the ED from [facility] for clogged PEG tube. Hx obtained via ED and NH due to non verbal status. Pt. Reportedly has had reflux of feeds/meds due and inability to flush PEG tube. She was sent to the ED here 1/27 for this issue, and the tube was able to be unclogged in ED. She was discharged back to [facility]. Per [facility], the patient continues to have issue with the tube repeatedly clogging which has led to inability to provide adequate nutrition and meds. In ED, pt. At baseline, following commands and nods no when asked if in any distress or pain.    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    Hypermagnesemia 04/11/2024    POA, Mg 3.2  Daily chem       Morbid obesity     Necrotizing fasciitis        Past Surgical History:   Procedure Laterality Date    CLOSURE OF WOUND Right 2/22/2024    Procedure: CLOSURE, WOUND;  Surgeon: Steve Cole MD;  Location: [facility] OR [address] [place] [address];  Service: General;  Laterality: Right;    ESOPHAGOGASTRODUODENOSCOPY W/ PEG N/A 2/22/2024    Procedure: EGD, WITH PEG TUBE INSERTION;  Surgeon: Steve Cole MD;  Location: [facility] OR [address] [place] [address];  Service: General;  Laterality: N/A;    INCISION AND DRAINAGE OF PERIRECTAL REGION N/A  1/29/2024    Procedure: INCISION AND DRAINAGE, PERIRECTAL REGION;  Surgeon: Axel Ramsay MD;  Location: [facility] OR;  Service: General;  Laterality: N/A;    INSERTION OF TUNNELED CENTRAL VENOUS HEMODIALYSIS CATHETER Right 6/27/2024    Procedure: Insertion, Catheter, Central Venous, Hemodialysis;  Surgeon: Anthony Martinez MD;  Location: [facility] CATH LAB;  Service: Interventional Nephrology;  Laterality: Right;    LUMBAR PUNCTURE N/A 2/16/2024    Procedure: Lumbar Puncture;  Surgeon: Macy Boykin;  Location: [facility];  Service: Anesthesiology;  Laterality: N/A;    PLACEMENT, TRIALYSIS CATH Right 1/31/2024    Procedure: INSERTION, CATHETER, TRIPLE LUMEN, HEMODIALYSIS, TEMPORARY;  Surgeon: Alvin Junior MD;  Location: [facility] OR;  Service: General;  Laterality: Right;    REPLACEMENT OF WOUND VACUUM-ASSISTED CLOSURE DEVICE Right 2/12/2024    Procedure: REPLACEMENT, WOUND VAC;  Surgeon: Mundo Carmona MD;  Location: [facility] OR [facility] FLR;  Service: General;  Laterality: Right;    REPLACEMENT OF WOUND VACUUM-ASSISTED CLOSURE DEVICE N/A 2/15/2024    Procedure: REPLACEMENT, WOUND VAC;  Surgeon: Steve Cole MD;  Location: [facility] OR 2ND FLR;  Service: General;  Laterality: N/A;    REPLACEMENT OF WOUND VACUUM-ASSISTED CLOSURE DEVICE Right 2/19/2024    Procedure: REPLACEMENT, WOUND VAC;  Surgeon: Steve Cole MD;  Location: [facility] OR 2ND FLR;  Service: General;  Laterality: Right;  RLE/groin    REPLACEMENT OF WOUND VACUUM-ASSISTED CLOSURE DEVICE Right 2/22/2024    Procedure: REPLACEMENT, WOUND VAC;  Surgeon: Steve Cole MD;  Location: [facility] OR 2ND FLR;  Service: General;  Laterality: Right;    TRACHEOSTOMY N/A 2/22/2024    Procedure: CREATION, TRACHEOSTOMY;  Surgeon: Steve Cole MD;  Location: [facility] OR 2ND FLR;  Service: General;  Laterality: N/A;    WOUND DEBRIDEMENT Bilateral 2/2/2024    Procedure: DEBRIDEMENT, WOUND;  Surgeon: Steve Cole MD;  Location: [facility] OR 2ND FLR;  Service: General;  Laterality: Bilateral;   "Bilateral groin  Possible wound vac placement    WOUND DEBRIDEMENT Right 2/6/2024    Procedure: DEBRIDEMENT, WOUND, replace wound vac, possible closure;  Surgeon: Steve Cole MD;  [facility];  Service: General;  Laterality: Right;  RLE    WOUND DEBRIDEMENT Right 2/9/2024    Procedure: DEBRIDEMENT, WOUND w wound vac change;  Surgeon: Steve Cole MD;  [facility];  Service: General;  Laterality: Right;  RLE    WOUND DEBRIDEMENT Right 2/12/2024    Procedure: R thigh wound debridement;  Surgeon: Mundo Carmona MD;  [facility];  Service: General;  Laterality: Right;    WOUND EXPLORATION Right 1/31/2024    Procedure: IRRIGATION & DEBRIDEMENT, WOUND DEBRIDEMENT;  Surgeon: Alvin Junior MD;  Location: [facility] OR;  Service: General;  Laterality: Right;       Review of patient's allergies indicates:  No Known Allergies    No current facility-administered medications on file prior to encounter.     Current Outpatient Medications on File Prior to Encounter   Medication Sig    ascorbic acid, vitamin C, (VITAMIN C) 500 MG tablet 500 mg by Per G Tube route 2 (two) times daily.    aspirin 81 MG Chew 1 tablet (81 mg total) by Per G Tube route once daily.    atorvastatin (LIPITOR) 40 MG tablet 1 tablet (40 mg total) by Per G Tube route once daily.    insulin aspart U-100 (NOVOLOG) 100 unit/mL (3 mL) InPn pen Inject 0-5 Units into the skin every 6 (six) hours as needed (Hyperglycemia). **LOW CORRECTION DOSE**  Blood Glucose  mg/dL                         151-200                0 unit  201-250                2 units  251-300                3 units  301-350                4 units  >350                     5 units  Administer subcutaneously if needed at times designated by monitoring schedule.   DO NOT HOLD correction dose insulin for patients who are  NPO.  "HIGH ALERT MEDICATION" - Administer with meals or TF/TPN.    insulin glargine U-100, Lantus, 100 unit/mL (3 mL) SubQ InPn pen " Inject 13 Units into the skin every evening.    metoprolol tartrate (LOPRESSOR) 25 MG tablet 1 tablet (25 mg total) by Per G Tube route 2 (two) times daily.    pantoprazole (PROTONIX) 40 mg suspension 1 packet (40 mg total) by Per G Tube route once daily.    polyethylene glycol (GLYCOLAX) 17 gram PwPk 17 g by Per G Tube route 2 (two) times daily as needed for Constipation.    sodium bicarbonate 650 MG tablet 1 tablet (650 mg total) by Per G Tube route 3 (three) times daily.     Family History    None       Tobacco Use    Smoking status: Unknown    Smokeless tobacco: Not on file   Substance and Sexual Activity    Alcohol use: Not Currently    Drug use: Not on file    Sexual activity: Not on file     Review of Systems   Unable to perform ROS: Patient nonverbal     Objective:     Vital Signs (Most Recent):  Temp: 98.4 °F (36.9 °C) (01/30/25 2113)  Pulse: 95 (01/30/25 2113)  Resp: 18 (01/30/25 2113)  BP: 123/84 (01/30/25 2113)  SpO2: 98 % (01/30/25 2113) Vital Signs (24h Range):  Temp:  [97.6 °F (36.4 °C)-98.4 °F (36.9 °C)] 98.4 °F (36.9 °C)  Pulse:  [88-95] 95  Resp:  [18] 18  SpO2:  [96 %-98 %] 98 %  BP: (123-152)/(79-84) 123/84        There is no height or weight on file to calculate BMI.     Physical Exam  Vitals reviewed.   Constitutional:       General: She is not in acute distress.     Appearance: She is well-developed.   HENT:      Head: Normocephalic and atraumatic.   Eyes:      Extraocular Movements: Extraocular movements intact.      Pupils: Pupils are equal, round, and reactive to light.   Neck:      Vascular: No JVD.      Trachea: No tracheal deviation.   Cardiovascular:      Rate and Rhythm: Normal rate and regular rhythm.      Heart sounds: No murmur heard.     No friction rub. No gallop.   Pulmonary:      Effort: No respiratory distress.      Breath sounds: Normal breath sounds. No wheezing or rales.   Abdominal:      General: Bowel sounds are normal. There is no distension.      Palpations: Abdomen is  soft. There is no mass.      Tenderness: There is no abdominal tenderness.      Comments: Peg tube in place, no purulence noted    Musculoskeletal:         General: No deformity.      Cervical back: Neck supple.   Lymphadenopathy:      Cervical: No cervical adenopathy.   Skin:     General: Skin is warm and dry.      Findings: No rash.   Neurological:      Mental Status: She is alert. Mental status is at baseline.      Comments: Follows commands, nods yes/no              CRANIAL NERVES     CN III, IV, VI   Pupils are equal, round, and reactive to light.       Significant Labs: All pertinent labs within the past 24 hours have been reviewed.    Significant Imaging: I have reviewed all pertinent imaging results/findings within the past 24 hours.  Assessment/Plan:     * PEG tube malfunction  -PEG tube with issue of recurrent clogging. Evaluated by general surgery who recommend replacement due to length of tube too short after being cut, now causing recurrent issues  -Pt. NPO, IR consulted for replacement    ESRD (end stage renal disease) on dialysis  -No acute indications for HD, nephrology consulted for routine HD while admitted    History of ischemic multifocal multiple vascular territories stroke  -Pt. Non-verbal at baseline, no acute issues. PEG tube to be replaced as above  -Resume home lipitor/ASA when PEG replaced      Type 2 diabetes mellitus with hyperglycemia, with long-term current use of insulin  -A1c pending. Pt. To remain NPO until PEG tube placement  -SSI for NPO patients ordered while admitted, monitor BG and add basal if needed        VTE Risk Mitigation (From admission, onward)           Ordered     heparin (porcine) injection 5,000 Units  Every 8 hours         01/30/25 2156     IP VTE HIGH RISK PATIENT  Once         01/30/25 2156     Place sequential compression device  Until discontinued         01/30/25 2156                       On 01/31/2025, patient should be placed in hospital observation  services under my care.             Jung Olmos MD  Department of Hospital Medicine  [facility] - Observation 11H

## 2025-01-31 NOTE — CONSULTS
Interventional Radiology  Consult/History & Physical Note    Consult Requested By: Jung Olmos MD  Reason for Consult: PEG tube replacement    SUBJECTIVE:     Chief Complaint:  G tube     History of Present Illness:  Sarah Saravia is a 54 y.o. female with a PMHx of CVA resulting in nonverbal status with PEG, DM2, and ESRD on HD MWF who was admitted on 1/30/25 for G tube malfunction. Interventional Radiology has been consulted for percutaneous gastrostomy tube exchange. Pt initially presented for a repeatedly clogged G tube. Pt was evaluated by the surgery team who easily unclogged the G tube at bedside. Per chart, G tube was repeatedly cut at an OSH due to clogging and is now so short that it does not have long term functionality. G tube was placed at an OSH, unclear when or by what service. CT a/p on 1/30 shows G tube in good position with balloon with the stomach lumen. The pt does not have an NG tube in place. The pt's WBC is 5.96 and is stable. Pt is afebrile and hemodynamically stable. She  does not have  a history of AMINATA requiring nightly CPAP or difficulty breathing when lying flat. She takes ASA 81 mg at home (held on admission). She has been NPO since midnight.    Review of Systems   Unable to perform ROS: Patient nonverbal       Scheduled Meds:  Continuous Infusions:  PRN Meds:  Current Facility-Administered Medications:     acetaminophen, 650 mg, Per G Tube, Q6H PRN    dextrose 50%, 12.5 g, Intravenous, PRN    dextrose 50%, 25 g, Intravenous, PRN    glucagon (human recombinant), 1 mg, Intramuscular, PRN    glucose, 16 g, Oral, PRN    glucose, 24 g, Oral, PRN    insulin aspart U-100, 0-10 Units, Subcutaneous, Q6H PRN    naloxone, 0.02 mg, Intravenous, PRN    ondansetron, 4 mg, Intravenous, Q8H PRN    prochlorperazine, 5 mg, Intravenous, Q6H PRN    sodium chloride 0.9%, 10 mL, Intravenous, PRN    Review of patient's allergies indicates:  No Known Allergies    Past Medical History:   Diagnosis Date     DM (diabetes mellitus)     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    Hypermagnesemia 04/11/2024    POA, Mg 3.2  Daily chem       Morbid obesity     Necrotizing fasciitis      Past Surgical History:   Procedure Laterality Date    CLOSURE OF WOUND Right 2/22/2024    Procedure: CLOSURE, WOUND;  Surgeon: Steve Cole MD;  Location: [address];  Service: General;  Laterality: Right;    ESOPHAGOGASTRODUODENOSCOPY W/ PEG N/A 2/22/2024    Procedure: EGD, WITH PEG TUBE INSERTION;  Surgeon: Steve Cole MD;  Location: [address];  Service: General;  Laterality: N/A;    INCISION AND DRAINAGE OF PERIRECTAL REGION N/A 1/29/2024    Procedure: INCISION AND DRAINAGE, PERIRECTAL REGION;  Surgeon: Axel Ramsay MD;  Location: [facility];  Service: General;  Laterality: N/A;    INSERTION OF TUNNELED CENTRAL VENOUS HEMODIALYSIS CATHETER Right 6/27/2024    Procedure: Insertion, Catheter, Central Venous, Hemodialysis;  Surgeon: Anthony Martinez MD;  Location: [facility] CATH LAB;  Service: Interventional Nephrology;  Laterality: Right;    LUMBAR PUNCTURE N/A 2/16/2024    Procedure: Lumbar Puncture;  Surgeon: Macy Boykin;  Location: [facility];  Service: Anesthesiology;  Laterality: N/A;    PLACEMENT, TRIALYSIS CATH Right 1/31/2024    Procedure: INSERTION, CATHETER, TRIPLE LUMEN, HEMODIALYSIS, TEMPORARY;  Surgeon: Alvin Junior MD;  Location: [facility] OR;  Service: General;  Laterality: Right;    REPLACEMENT OF WOUND VACUUM-ASSISTED CLOSURE DEVICE Right 2/12/2024    Procedure: REPLACEMENT, WOUND VAC;  Surgeon: Mundo Carmona MD;  Location: [address];  Service: General;  Laterality: Right;    REPLACEMENT OF WOUND VACUUM-ASSISTED CLOSURE DEVICE N/A 2/15/2024    Procedure: REPLACEMENT, WOUND VAC;  Surgeon: Steve Cole MD;  Location: [address];  Service: General;  Laterality: N/A;    REPLACEMENT OF WOUND VACUUM-ASSISTED CLOSURE DEVICE Right 2/19/2024    Procedure: REPLACEMENT, WOUND VAC;  Surgeon: Steve Cole MD;   Location: [facility] OR 2ND FLR;  Service: General;  Laterality: Right;  RLE/groin    REPLACEMENT OF WOUND VACUUM-ASSISTED CLOSURE DEVICE Right 2/22/2024    Procedure: REPLACEMENT, WOUND VAC;  Surgeon: Steve Cole MD;  [facility];  Service: General;  Laterality: Right;    TRACHEOSTOMY N/A 2/22/2024    Procedure: CREATION, TRACHEOSTOMY;  Surgeon: Steve Cole MD;  [facility];  Service: General;  Laterality: N/A;    WOUND DEBRIDEMENT Bilateral 2/2/2024    Procedure: DEBRIDEMENT, WOUND;  Surgeon: Steve Cole MD;  [facility];  Service: General;  Laterality: Bilateral;  Bilateral groin  Possible wound vac placement    WOUND DEBRIDEMENT Right 2/6/2024    Procedure: DEBRIDEMENT, WOUND, replace wound vac, possible closure;  Surgeon: Steve Cole MD;  [facility];  Service: General;  Laterality: Right;  RLE    WOUND DEBRIDEMENT Right 2/9/2024    Procedure: DEBRIDEMENT, WOUND w wound vac change;  Surgeon: Steve Cole MD;  [facility];  Service: General;  Laterality: Right;  RLE    WOUND DEBRIDEMENT Right 2/12/2024    Procedure: R thigh wound debridement;  Surgeon: Mundo Carmona MD;  Location: [facility] OR [address] [place] [address];  Service: General;  Laterality: Right;    WOUND EXPLORATION Right 1/31/2024    Procedure: IRRIGATION & DEBRIDEMENT, WOUND DEBRIDEMENT;  Surgeon: Alvin Junior MD;  Location: [facility];  Service: General;  Laterality: Right;     No family history on file.  Social History     Tobacco Use    Smoking status: Unknown   Substance Use Topics    Alcohol use: Not Currently       OBJECTIVE:     Vital Signs (Most Recent)  Temp: 97.7 °F (36.5 °C) (01/31/25 0739)  Pulse: 103 (01/31/25 0739)  Resp: 16 (01/31/25 0739)  BP: 132/81 (01/31/25 0739)  SpO2: 95 % (01/31/25 0739)    Physical Exam:  Physical Exam  Vitals and nursing note reviewed.   Constitutional:       General: She is not in acute distress.     Appearance: She is ill-appearing.   HENT:     "  Head: Normocephalic and atraumatic.   Eyes:      Extraocular Movements: Extraocular movements intact.      Conjunctiva/sclera: Conjunctivae normal.      Pupils: Pupils are equal, round, and reactive to light.   Cardiovascular:      Rate and Rhythm: Normal rate.   Pulmonary:      Effort: Pulmonary effort is normal. No respiratory distress.   Abdominal:      General: Abdomen is flat.      Comments: Midline G tube   Skin:     General: Skin is warm and dry.      Coloration: Skin is not jaundiced.   Neurological:      Mental Status: She is alert.   Psychiatric:         Speech: She is noncommunicative.         Laboratory  I have reviewed all pertinent lab results within the past 24 hours.  CBC:   Recent Labs   Lab 01/31/25  0246   WBC 5.96   RBC 4.92   HGB 13.2   HCT 41.6      MCV 85   MCH 26.8*   MCHC 31.7*     BMP:   Recent Labs   Lab 01/31/25  0246   GLU 65*      K 3.6   CL 98   CO2 24   BUN 47*   CREATININE 5.4*   CALCIUM 10.7*     CMP:   Recent Labs   Lab 01/31/25  0246   GLU 65*   CALCIUM 10.7*   ALBUMIN 3.1*   PROT 8.4      K 3.6   CO2 24   CL 98   BUN 47*   CREATININE 5.4*   ALKPHOS 130   *   *   BILITOT 0.4     LFTs:   Recent Labs   Lab 01/31/25  0246   *   *   ALKPHOS 130   BILITOT 0.4   PROT 8.4   ALBUMIN 3.1*     Coagulation: No results for input(s): "LABPROT", "INR", "APTT" in the last 168 hours.  Microbiology Results (last 7 days)       ** No results found for the last 168 hours. **            ASA/Mallampati  ASA: 3  Mallampati: 2    Imaging:  Recent imaging studies including CT a/p without IV contrast on 1/30/25     EXAMINATION:  CT ABDOMEN PELVIS WITHOUT CONTRAST     CLINICAL HISTORY:  Abdominal pain, acute, nonlocalized;Study requested by gen surg to evaluate clogged peg tube; would like to know if there is an inflated balloon present;     TECHNIQUE:  Low dose axial images, sagittal and coronal reformations were obtained from the lung bases to the pubic " symphysis. Oral and IV contrast not administered.     COMPARISON:  CT, 06/18/2024.     FINDINGS:  Lower chest: Heart and lung bases are similar to prior exam.     Abdomen:     Evaluation of the solid abdominal organs and bowel is limited in the absence of IV contrast.     Liver and gallbladder stable.  Cholelithiasis versus biliary sludge or vicarious excretion of contrast in the gallbladder.  No pericholecystic inflammatory changes.  No obvious biliary duct dilatation.     Spleen, adrenals, and pancreas are stable and negative for acute finding.     Kidneys are symmetric.  No renal or ureteral stones. No hydronephrosis.     Percutaneous gastrostomy tube is present.  Inflated balloon noted in the stomach lumen.  Stomach is not overly distended.  Small hiatal hernia.  No bowel obstruction.  Mild stool burden in the colon.  Normal appendix.     No abdominal free fluid or pneumoperitoneum.     Abdominal aorta is stable in course and caliber with mild calcific atherosclerosis.     No bulky retroperitoneal lymphadenopathy.     Pelvis: Urinary bladder is decompressed and not well evaluated.  Rectum is mildly distended with stool.  Pelvic organs are unremarkable.  No significant pelvic free fluid.     Bones and soft tissues: No aggressive osseous lesions. Degenerative changes in the spine.  Small fat containing umbilical hernia.  Scarring and edema versus small hematoma in the right groin soft tissues, though similar when compared with prior exam.  Probable mild muscular atrophy.  Mild body wall edema.     Impression:     Inflated gastrostomy tube balloon in the stomach lumen.     Other incidental findings discussed in the body of the report.        Electronically signed by:Mundo Rouse MD  Date:                                            01/30/2025  Time:                                           20:47    ASSESSMENT/PLAN:     Assessment:  54 y.o. female with a PMHx of  CVA resulting in nonverbal status with PEG, DM2,  and ESRD on HD MWF who has been referred to IR for percutaneous gastrostomy tube exchange due to tube being cut so short and no longer having long term functionality. The procedure was discussed in great detail with the pt and her daughter including thorough explanations of the potential risks and benefits of gastrostomy tube placement. Risks include but are not limited to sepsis, severe infection, hemorrhage, bowel injury, catheter dislodgement, catheter blockage and need for additional procedures. The pt is a candidate for percutaneous gastrostromy tube exchange under moderate sedation. Plan discussed with ordering physician and pt and her daughter who verbalized understanding of the plan and would like to proceed. Will obtain consent from pt's daughter.    Plan:  Will proceed with percutaneous gastrostomy tube exchange under moderate sedation on 1/31/25.  Please keep pt NPO  Anticoagulation history reviewed.   Coagulation labs reviewed- not routinely recommended per SIR  Thank you for the consult. Please contact with questions via [employer] secure chat or spectra.    Time spent during patient care today was 79 minutes. This includes time spent before the visit reviewing the chart, discussing case with staff physician and ordering provider, time spent during the face to face patient visit, and time spent after the visit on documentation. Time excludes procedure time.     Molly Merida PA-C  Interventional Radiology  Spectra: [phone]

## 2025-01-31 NOTE — ASSESSMENT & PLAN NOTE
-Pt. Non-verbal at baseline, no acute issues. PEG tube to be replaced as above  -Resume home lipitor/ASA when PEG replaced

## 2025-01-31 NOTE — CONSULTS
[facility] - Emergency Dept  General Surgery  Consult Note    Patient Name: Sarah Saravia  MRN: [id number]  Code Status: Prior  Admission Date: 1/30/2025  Hospital Length of Stay: 0 days  Attending Physician: Marisa Mcneil MD  Primary Care Provider: Deanna Primary Doctor    Patient information was obtained from relative(s).     Inpatient consult to General Surgery  Consult performed by: Lima Lanza MD  Consult ordered by: Kylah Desai DO        Subjective:     Principal Problem: <principal problem not specified>    History of Present Illness: 53 y/o F with PMHx of CVA (now nonverbal), DM and obesity presents with complaints of clogged PEG tube. Patient seen on 1/27 with the same problem. The tube was flushed and she was referred to GI for PEG tube replacement. She returns today with a clogged PEG. General surgery consulted to evaluate PEG tube. Tube unclogged at bedside without issue. Flushes easily. The tube has been repeatedly cut an is now only a couple of inches outside the skin. She will ultimately need GI or IR consultation for tube exchange.     No current facility-administered medications on file prior to encounter.     Current Outpatient Medications on File Prior to Encounter   Medication Sig    ascorbic acid, vitamin C, (VITAMIN C) 500 MG tablet 500 mg by Per G Tube route 2 (two) times daily.    aspirin 81 MG Chew 1 tablet (81 mg total) by Per G Tube route once daily.    atorvastatin (LIPITOR) 40 MG tablet 1 tablet (40 mg total) by Per G Tube route once daily.    insulin aspart U-100 (NOVOLOG) 100 unit/mL (3 mL) InPn pen Inject 0-5 Units into the skin every 6 (six) hours as needed (Hyperglycemia). **LOW CORRECTION DOSE**  Blood Glucose  mg/dL                         151-200                0 unit  201-250                2 units  251-300                3 units  301-350                4 units  >350                     5 units  Administer subcutaneously if needed at times designated by monitoring  "schedule.   DO NOT HOLD correction dose insulin for patients who are  NPO.  "HIGH ALERT MEDICATION" - Administer with meals or TF/TPN.    insulin glargine U-100, Lantus, 100 unit/mL (3 mL) SubQ InPn pen Inject 13 Units into the skin every evening.    metoprolol tartrate (LOPRESSOR) 25 MG tablet 1 tablet (25 mg total) by Per G Tube route 2 (two) times daily.    pantoprazole (PROTONIX) 40 mg suspension 1 packet (40 mg total) by Per G Tube route once daily.    polyethylene glycol (GLYCOLAX) 17 gram PwPk 17 g by Per G Tube route 2 (two) times daily as needed for Constipation.    sodium bicarbonate 650 MG tablet 1 tablet (650 mg total) by Per G Tube route 3 (three) times daily.       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    Hypermagnesemia 04/11/2024    POA, Mg 3.2  Daily chem       Morbid obesity     Necrotizing fasciitis      Past Surgical History:   Procedure Laterality Date    CLOSURE OF WOUND Right 2/22/2024    Procedure: CLOSURE, WOUND;  Surgeon: Steve Cole MD;  Location: [address];  Service: General;  Laterality: Right;    ESOPHAGOGASTRODUODENOSCOPY W/ PEG N/A 2/22/2024    Procedure: EGD, WITH PEG TUBE INSERTION;  Surgeon: Steve Cole MD;  Location: [address];  Service: General;  Laterality: N/A;    INCISION AND DRAINAGE OF PERIRECTAL REGION N/A 1/29/2024    Procedure: INCISION AND DRAINAGE, PERIRECTAL REGION;  Surgeon: Axel Ramsay MD;  Location: [facility] OR;  Service: General;  Laterality: N/A;    INSERTION OF TUNNELED CENTRAL VENOUS HEMODIALYSIS CATHETER Right 6/27/2024    Procedure: Insertion, Catheter, Central Venous, Hemodialysis;  Surgeon: Anthony Martinez MD;  Location: [facility] CATH LAB;  Service: Interventional Nephrology;  Laterality: Right;    LUMBAR PUNCTURE N/A 2/16/2024    Procedure: Lumbar Puncture;  Surgeon: Macy Boykin;  Location: [facility] MACY;  Service: " Anesthesiology;  Laterality: N/A;    PLACEMENT, TRIALYSIS CATH Right 1/31/2024    Procedure: INSERTION, CATHETER, TRIPLE LUMEN, HEMODIALYSIS, TEMPORARY;  Surgeon: Alvin Junior MD;  Location: [facility] OR;  Service: General;  Laterality: Right;    REPLACEMENT OF WOUND VACUUM-ASSISTED CLOSURE DEVICE Right 2/12/2024    Procedure: REPLACEMENT, WOUND VAC;  Surgeon: Mundo Carmona MD;  Location: [facility] OR 2ND FLR;  Service: General;  Laterality: Right;    REPLACEMENT OF WOUND VACUUM-ASSISTED CLOSURE DEVICE N/A 2/15/2024    Procedure: REPLACEMENT, WOUND VAC;  Surgeon: Steve Cole MD;  Location: [facility] OR 2ND FLR;  Service: General;  Laterality: N/A;    REPLACEMENT OF WOUND VACUUM-ASSISTED CLOSURE DEVICE Right 2/19/2024    Procedure: REPLACEMENT, WOUND VAC;  Surgeon: Steve Cole MD;  Location: [facility] OR [facility] FLR;  Service: General;  Laterality: Right;  RLE/groin    REPLACEMENT OF WOUND VACUUM-ASSISTED CLOSURE DEVICE Right 2/22/2024    Procedure: REPLACEMENT, WOUND VAC;  Surgeon: Steve Cole MD;  Location: [facility] OR [facility] FLR;  Service: General;  Laterality: Right;    TRACHEOSTOMY N/A 2/22/2024    Procedure: CREATION, TRACHEOSTOMY;  Surgeon: Steve Cole MD;  Location: [facility] OR [facility] FLR;  Service: General;  Laterality: N/A;    WOUND DEBRIDEMENT Bilateral 2/2/2024    Procedure: DEBRIDEMENT, WOUND;  Surgeon: Steve Cole MD;  [facility];  Service: General;  Laterality: Bilateral;  Bilateral groin  Possible wound vac placement    WOUND DEBRIDEMENT Right 2/6/2024    Procedure: DEBRIDEMENT, WOUND, replace wound vac, possible closure;  Surgeon: Steve Cole MD;  Location: [facility] OR [facility] FLR;  Service: General;  Laterality: Right;  RLE    WOUND DEBRIDEMENT Right 2/9/2024    Procedure: DEBRIDEMENT, WOUND w wound vac change;  Surgeon: Steve Cole MD;  Location: [facility] OR 2ND FLR;  Service: General;  Laterality: Right;  RLE    WOUND DEBRIDEMENT Right 2/12/2024    Procedure: R thigh wound debridement;  Surgeon:  Mundo Carmona MD;  [facility];  Service: General;  Laterality: Right;    WOUND EXPLORATION Right 1/31/2024    Procedure: IRRIGATION & DEBRIDEMENT, WOUND DEBRIDEMENT;  Surgeon: Alvin Junior MD;  Location: [facility] OR;  Service: General;  Laterality: Right;     Family History    None       Tobacco Use    Smoking status: Unknown    Smokeless tobacco: Not on file   Substance and Sexual Activity    Alcohol use: Not Currently    Drug use: Not on file    Sexual activity: Not on file     Review of Systems   Reason unable to perform ROS: unable to participate.     Objective:     Vital Signs (Most Recent):  Temp: 97.6 °F (36.4 °C) (01/30/25 1502)  Pulse: 88 (01/30/25 1502)  Resp: 18 (01/30/25 1502)  BP: (!) 152/79 (01/30/25 1502)  SpO2: 96 % (01/30/25 1502) Vital Signs (24h Range):  Temp:  [97.6 °F (36.4 °C)] 97.6 °F (36.4 °C)  Pulse:  [88] 88  Resp:  [18] 18  SpO2:  [96 %] 96 %  BP: (152)/(79) 152/79        There is no height or weight on file to calculate BMI.     Physical Exam  Vitals reviewed.   Constitutional:       General: She is not in acute distress.     Appearance: Normal appearance. She is not toxic-appearing.   HENT:      Head: Normocephalic and atraumatic.      Nose: Nose normal.   Cardiovascular:      Rate and Rhythm: Normal rate.      Pulses: Normal pulses.   Pulmonary:      Effort: Pulmonary effort is normal. No respiratory distress.   Abdominal:      General: Abdomen is flat.      Palpations: Abdomen is soft.      Comments: G tube in place, tube cut. Bumper in place. Abdomen non tender. Tube flushes easily.    Skin:     General: Skin is warm.   Neurological:      Mental Status: She is alert. Mental status is at baseline.            I have reviewed all pertinent lab results within the past 24 hours.  CBC:   Recent Labs   Lab 01/27/25  0000   HGB 12.9     CMP:   Recent Labs   Lab 01/27/25  0000   CALCIUM 13.3*       Significant Diagnostics:  I have reviewed all pertinent imaging  results/findings within the past 24 hours.    Assessment/Plan:     PEG (percutaneous endoscopic gastrostomy) status  54F presenting with a clogged PEG tube. Tube unclogged at bedside. Easily flushes. Tube has been repeatedly cut at an outside facility. Will need GI or IR consultation for tube exchange.     -- recommend GI or IR consultation of tube exchange  -- the tube is flushable, but is now cut so short that it does not have long term functionability  -- recommend GI or IR consultation for tube replacement   -- please call general surgery if there are any questions or concerns        Lima Lanza MD  General Surgery  [facility] - Emergency Dept

## 2025-01-31 NOTE — CONSULTS
[facility]  Nephrology  Consult Note    Patient Name: Sarah Saravia  MRN: [id number]  Admission Date: 1/30/2025  Hospital Length of Stay: 0 days  Attending Provider: Tianna Bro MD   Primary Care Physician: Deanna, Primary Doctor  Principal Problem:PEG tube malfunction    Inpatient consult to Nephrology  Consult performed by: Estee Nichols PA-C  Consult ordered by: Jung Olmos MD  Reason for consult: ESRD        Subjective:     HPI: 53 yo F with PMHx CVA resulting in nonverbal status with PEG, DM2, and ESRD on HD MWF who presents to the ED from [facility] for clogged PEG tube. Hx obtained via ED and NH due to non verbal status. Pt. Reportedly has had reflux of feeds/meds due and inability to flush PEG tube. She was sent to the ED here 1/27 for this issue, and the tube was able to be unclogged in ED. She was discharged back to [facility]. Per [facility], the patient continues to have issue with the tube repeatedly clogging which has led to inability to provide adequate nutrition and meds. In ED, pt. At baseline, following commands and nods no when asked if in any distress or pain. Nephrology consulted for management of HD while inpatient.     History obtained from EMR review and family interview.     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 OR [address] [place] [address];  Service: General;  Laterality: Right;    ESOPHAGOGASTRODUODENOSCOPY W/ PEG N/A 2/22/2024    Procedure: EGD, WITH PEG TUBE INSERTION;  Surgeon: Steve Cole MD;  Location: [facility] OR [address] [place] [address];  Service: General;  Laterality: N/A;    INCISION AND DRAINAGE OF PERIRECTAL REGION N/A 1/29/2024    Procedure: INCISION  AND DRAINAGE, PERIRECTAL REGION;  Surgeon: Axel Ramsay MD;  Location: [facility] OR;  Service: General;  Laterality: N/A;    INSERTION OF TUNNELED CENTRAL VENOUS HEMODIALYSIS CATHETER Right 6/27/2024    Procedure: Insertion, Catheter, Central Venous, Hemodialysis;  Surgeon: Anthony Martinez MD;  Location: [facility] CATH LAB;  Service: Interventional Nephrology;  Laterality: Right;    LUMBAR PUNCTURE N/A 2/16/2024    Procedure: Lumbar Puncture;  Surgeon: Macy Boykin;  Location: [facility];  Service: Anesthesiology;  Laterality: N/A;    PLACEMENT, TRIALYSIS CATH Right 1/31/2024    Procedure: INSERTION, CATHETER, TRIPLE LUMEN, HEMODIALYSIS, TEMPORARY;  Surgeon: Alvin Junior MD;  Location: [facility] OR;  Service: General;  Laterality: Right;    REPLACEMENT OF WOUND VACUUM-ASSISTED CLOSURE DEVICE Right 2/12/2024    Procedure: REPLACEMENT, WOUND VAC;  Surgeon: Mundo Carmona MD;  [facility];  Service: General;  Laterality: Right;    REPLACEMENT OF WOUND VACUUM-ASSISTED CLOSURE DEVICE N/A 2/15/2024    Procedure: REPLACEMENT, WOUND VAC;  Surgeon: Steve Cole MD;  Location: [facility] OR [facility] FLR;  Service: General;  Laterality: N/A;    REPLACEMENT OF WOUND VACUUM-ASSISTED CLOSURE DEVICE Right 2/19/2024    Procedure: REPLACEMENT, WOUND VAC;  Surgeon: Steve Cole MD;  [facility];  Service: General;  Laterality: Right;  RLE/groin    REPLACEMENT OF WOUND VACUUM-ASSISTED CLOSURE DEVICE Right 2/22/2024    Procedure: REPLACEMENT, WOUND VAC;  Surgeon: Steve Cole MD;  Location: [facility] OR [facility] FLR;  Service: General;  Laterality: Right;    TRACHEOSTOMY N/A 2/22/2024    Procedure: CREATION, TRACHEOSTOMY;  Surgeon: Steve Cole MD;  Location: [facility] OR 2ND FLR;  Service: General;  Laterality: N/A;    WOUND DEBRIDEMENT Bilateral 2/2/2024    Procedure: DEBRIDEMENT, WOUND;  Surgeon: Steve Cole MD;  Location: [facility] OR [facility] FLR;  Service: General;  Laterality: Bilateral;  Bilateral groin  Possible wound vac  placement    WOUND DEBRIDEMENT Right 2/6/2024    Procedure: DEBRIDEMENT, WOUND, replace wound vac, possible closure;  Surgeon: Steve Cole MD;  [facility];  Service: General;  Laterality: Right;  RLE    WOUND DEBRIDEMENT Right 2/9/2024    Procedure: DEBRIDEMENT, WOUND w wound vac change;  Surgeon: Steve Cole MD;  [facility];  Service: General;  Laterality: Right;  RLE    WOUND DEBRIDEMENT Right 2/12/2024    Procedure: R thigh wound debridement;  Surgeon: Mundo Carmona MD;  [facility];  Service: General;  Laterality: Right;    WOUND EXPLORATION Right 1/31/2024    Procedure: IRRIGATION & DEBRIDEMENT, WOUND DEBRIDEMENT;  Surgeon: Alvin Junior MD;  Location: [facility];  Service: General;  Laterality: Right;       Review of patient's allergies indicates:  No Known Allergies  Current Facility-Administered Medications   Medication Frequency    acetaminophen tablet 650 mg Q6H PRN    dextrose 50% injection 12.5 g PRN    dextrose 50% injection 25 g PRN    glucagon (human recombinant) injection 1 mg PRN    glucose chewable tablet 16 g PRN    glucose chewable tablet 24 g PRN    insulin aspart U-100 pen 0-10 Units Q6H PRN    naloxone 0.4 mg/mL injection 0.02 mg PRN    ondansetron injection 4 mg Q8H PRN    prochlorperazine injection Soln 5 mg Q6H PRN    sodium chloride 0.9% flush 10 mL PRN     Family History    None       Tobacco Use    Smoking status: Unknown    Smokeless tobacco: Not on file   Substance and Sexual Activity    Alcohol use: Not Currently    Drug use: Not on file    Sexual activity: Not on file     Review of Systems   Unable to perform ROS: Patient nonverbal     Objective:     Vital Signs (Most Recent):  Temp: 97.7 °F (36.5 °C) (01/31/25 0739)  Pulse: 103 (01/31/25 0739)  Resp: 16 (01/31/25 0739)  BP: 132/81 (01/31/25 0739)  SpO2: 95 % (01/31/25 0739) Vital Signs (24h Range):  Temp:  [97.6 °F (36.4 °C)-99.2 °F (37.3 °C)] 97.7 °F (36.5 °C)  Pulse:   [] 103  Resp:  [16-20] 16  SpO2:  [95 %-100 %] 95 %  BP: (103-152)/(64-84) 132/81     Weight: 105.8 kg (233 lb 4 oz) (01/31/25 0051)  Body mass index is 36.53 kg/m².  Body surface area is 2.24 meters squared.    No intake/output data recorded.     Physical Exam  Vitals and nursing note reviewed.   Constitutional:       General: She is not in acute distress.     Appearance: She is ill-appearing (chronically).   HENT:      Head: Normocephalic.   Pulmonary:      Effort: Pulmonary effort is normal. No respiratory distress.      Breath sounds: No wheezing or rales.   Chest:      Comments: R TDC in place, no surrounding erythema, drainage  Abdominal:      General: There is no distension.      Palpations: Abdomen is soft.   Musculoskeletal:      Right lower leg: No edema.      Left lower leg: No edema.   Skin:     General: Skin is warm and dry.   Neurological:      Mental Status: She is alert.          Significant Labs:  CBC:   Recent Labs   Lab 01/31/25  0246   WBC 5.96   RBC 4.92   HGB 13.2   HCT 41.6      MCV 85   MCH 26.8*   MCHC 31.7*     CMP:   Recent Labs   Lab 01/31/25  0246   GLU 65*   CALCIUM 10.7*   ALBUMIN 3.1*   PROT 8.4      K 3.6   CO2 24   CL 98   BUN 47*   CREATININE 5.4*   ALKPHOS 130   *   *   BILITOT 0.4     All labs within the past 24 hours have been reviewed.    Assessment/Plan:     Renal/  ESRD (end stage renal disease) on dialysis  54 year old female hx of ESRD on HD MWF presenting for clogged PEG tube. Nephrology consulted for HD management.     Nephrology History  -Outpatient HD unit: [facility]  -Nephrologist: Yang  -HD tx days: MWF  -HD tx time: 4 hrs  -Last HD tx: 1/29/25 ?  -HD access: R TDC  -HD modality: iHD  -Residual urine: ?  -EDW:  107.5 kg    Plan/Recommendations  ESRD on HD:  -No need for emergent RRT, electrolytes and volume status stable. Due to high volume of dialysis patients, need to triage patients, will plan for HD tomorrow morning.  Would not recommend discharge prior to patient receiving HD unless patient is able to be scheduled to receive HD at outpatient dialysis unit tomorrow.  -Will continue iHD thrice weekly unless emergent indication arises  -Strict I&Os  -Pre & post HD weight  Anemia in ESRD  -Hgb goal 10-11, hgb 13.2, above goal, no Epo  Mineral Bone Disease in ESRD  -Not currently on binders, management per OP unit  -Ca chronically high, likely 2/2 immobility, PTH low, management per OP unit  -Renal diet if not NPO      GI  * PEG tube malfunction  -management per primary        Thank you for your consult. I will follow-up with patient. Please contact us if you have any additional questions.    Estee Nichols PA-C  Nephrology  [facility] - [facility]ation [facility]

## 2025-01-31 NOTE — HPI
55 yo F with PMHx CVA resulting in nonverbal status with PEG, DM2, and ESRD on HD MWF who presents to the ED from [facility] [place] for clogged PEG tube. Hx obtained via ED and NH due to non verbal status. Pt. Reportedly has had reflux of feeds/meds due and inability to flush PEG tube. She was sent to the ED here 1/27 for this issue, and the tube was able to be unclogged in ED. She was discharged back to [facility]. Per [facility] [place], the patient continues to have issue with the tube repeatedly clogging which has led to inability to provide adequate nutrition and meds. In ED, pt. At baseline, following commands and nods no when asked if in any distress or pain. Nephrology consulted for management of HD while inpatient.     History obtained from EMR review and family interview.

## 2025-01-31 NOTE — ASSESSMENT & PLAN NOTE
54F presenting with a clogged PEG tube. Tube unclogged at bedside. Easily flushes. Tube has been repeatedly cut at an outside facility. Will need GI or IR consultation for tube exchange.     -- recommend GI or IR consultation of tube exchange  -- the tube is flushable, but is now cut so short that it does not have long term functionability  -- recommend GI or IR consultation for tube replacement   -- please call general surgery if there are any questions or concerns

## 2025-01-31 NOTE — HPI
53 y/o F with PMHx of CVA (now nonverbal), DM and obesity presents with complaints of clogged PEG tube. Patient seen on 1/27 with the same problem. The tube was flushed and she was referred to GI for PEG tube replacement. She returns today with a clogged PEG. General surgery consulted to evaluate PEG tube. Tube unclogged at bedside without issue. Flushes easily. The tube has been repeatedly cut an is now only a couple of inches outside the skin. She will ultimately need GI or IR consultation for tube exchange.

## 2025-01-31 NOTE — PLAN OF CARE
01/31/25 1114   Post-Acute Status   Post-Acute Authorization Placement   Post-Acute Placement Status Referrals Sent     Pt is a resident of [facility] and is expected to return once she is medically stable. KARI faxed referral [email or web address] software for review.    Discharge Plan A and Plan B have been determined by review of patient's clinical status, future medical and therapeutic needs, and coverage/benefits for post-acute care in coordination with multidisciplinary team members.    KARI will continue to follow up.      Daylin Xavier LMSW  [facility]  Ext. [phone]

## 2025-01-31 NOTE — PLAN OF CARE
PEG tube replaced by IR. Unable to accommodate HD today due to availability. [facility] unable to provide unscheduled HD tomorrow as a substitute. Per CM, [facility] unable to take patient back over the weekend. Expect [facility] 2/3/25.     Tianna Bro MD  Department of Hospital Medicine  Hosp Med Team G/E  Available via secure chat from 7am - 7pm

## 2025-01-31 NOTE — PLAN OF CARE
[facility]  Initial Discharge Assessment       Primary Care Provider: Deanna, Primary Doctor    Admission Diagnosis: PEG tube malfunction [K94.23]    Admission Date: 1/30/2025  Expected Discharge Date: 2/1/2025         Payor: MEDICAID / Plan: MEDICAID OF [place] / Product Type: Government /     Extended Emergency Contact Information  Primary Emergency Contact: ZAC NEWMAN  Mobile Phone: [phone]  Relation: Daughter  Secondary Emergency Contact: Milana Joaquin  Mobile Phone: [phone]  Relation: Sister    Discharge Plan A: (P) Return to nursing home  Discharge Plan B: (P) Return to Nursing Home      "[id number] - [place], [place] - [address] [place]  [place] [address]  Phone: [phone] Fax: [phone]                 KARI completed Discharge Planning Assessment with patient's daughter, Melissa via telephone. Discharge planning booklet given to patient/family and whiteboard updated with ZEKE and phone #. All questions answered.    Patient will need assistance with transportation upon discharge.     [facility], and is expected to return once she is medically stable. [facility] reported that prior to hospitalization patient was bed bound and required max assistance with her ADL's. [facility] on Mon, Wed, and Fri for dialysis. Patient does not go to a Coumadin clinic.      Discharge Plan A and Plan B have been determined by review of patient's clinical status, future medical and therapeutic needs, and coverage/benefits for post-acute care in coordination with multidisciplinary team members.      Daylin Xavier LMSW  [facility]  Ext. [phone]

## 2025-01-31 NOTE — ED TRIAGE NOTES
Sarah Donatoey, a 54 y.o. female presents to the ED w/ complaint of clogged PEG that is split. Pt denies pain at this time.     Triage note:  Chief Complaint   Patient presents with    Peg tube problem     From [facility] for peg tube problem, peg clogged and split not allowing meds/feeds to pass through.      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

## 2025-01-31 NOTE — SUBJECTIVE & OBJECTIVE
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    Hypermagnesemia 04/11/2024    POA, Mg 3.2  Daily chem       Morbid obesity     Necrotizing fasciitis        Past Surgical History:   Procedure Laterality Date    CLOSURE OF WOUND Right 2/22/2024    Procedure: CLOSURE, WOUND;  Surgeon: Steve Cole MD;  Location: [address];  Service: General;  Laterality: Right;    ESOPHAGOGASTRODUODENOSCOPY W/ PEG N/A 2/22/2024    Procedure: EGD, WITH PEG TUBE INSERTION;  Surgeon: Steve Cole MD;  Location: [address];  Service: General;  Laterality: N/A;    INCISION AND DRAINAGE OF PERIRECTAL REGION N/A 1/29/2024    Procedure: INCISION AND DRAINAGE, PERIRECTAL REGION;  Surgeon: Axel Ramsay MD;  Location: [facility] OR;  Service: General;  Laterality: N/A;    INSERTION OF TUNNELED CENTRAL VENOUS HEMODIALYSIS CATHETER Right 6/27/2024    Procedure: Insertion, Catheter, Central Venous, Hemodialysis;  Surgeon: Anthony Martinez MD;  Location: [facility] CATH LAB;  Service: Interventional Nephrology;  Laterality: Right;    LUMBAR PUNCTURE N/A 2/16/2024    Procedure: Lumbar Puncture;  Surgeon: Macy Boykin;  Location: [facility];  Service: Anesthesiology;  Laterality: N/A;    PLACEMENT, TRIALYSIS CATH Right 1/31/2024    Procedure: INSERTION, CATHETER, TRIPLE LUMEN, HEMODIALYSIS, TEMPORARY;  Surgeon: Alvin Junior MD;  Location: [facility] OR;  Service: General;  Laterality: Right;    REPLACEMENT OF WOUND VACUUM-ASSISTED CLOSURE DEVICE Right 2/12/2024    Procedure: REPLACEMENT, WOUND VAC;  Surgeon: Mundo Carmona MD;  Location: [address];  Service: General;  Laterality: Right;    REPLACEMENT OF WOUND VACUUM-ASSISTED CLOSURE DEVICE N/A 2/15/2024    Procedure: REPLACEMENT, WOUND VAC;  Surgeon: Steve Cole MD;  Location: [facility] OR [address] [place] [address];  Service: General;  Laterality: N/A;    REPLACEMENT OF WOUND VACUUM-ASSISTED CLOSURE DEVICE Right 2/19/2024    Procedure:  REPLACEMENT, WOUND VAC;  Surgeon: Steve Cole MD;  Location: [facility] OR [facility] FLR;  Service: General;  Laterality: Right;  RLE/groin    REPLACEMENT OF WOUND VACUUM-ASSISTED CLOSURE DEVICE Right 2/22/2024    Procedure: REPLACEMENT, WOUND VAC;  Surgeon: Steve Cole MD;  [facility];  Service: General;  Laterality: Right;    TRACHEOSTOMY N/A 2/22/2024    Procedure: CREATION, TRACHEOSTOMY;  Surgeon: Steve Cole MD;  [facility];  Service: General;  Laterality: N/A;    WOUND DEBRIDEMENT Bilateral 2/2/2024    Procedure: DEBRIDEMENT, WOUND;  Surgeon: Steve Cole MD;  [facility];  Service: General;  Laterality: Bilateral;  Bilateral groin  Possible wound vac placement    WOUND DEBRIDEMENT Right 2/6/2024    Procedure: DEBRIDEMENT, WOUND, replace wound vac, possible closure;  Surgeon: Steve Cole MD;  [facility];  Service: General;  Laterality: Right;  RLE    WOUND DEBRIDEMENT Right 2/9/2024    Procedure: DEBRIDEMENT, WOUND w wound vac change;  Surgeon: Steve Cole MD;  [facility];  Service: General;  Laterality: Right;  RLE    WOUND DEBRIDEMENT Right 2/12/2024    Procedure: R thigh wound debridement;  Surgeon: Mundo Carmona MD;  [facility];  Service: General;  Laterality: Right;    WOUND EXPLORATION Right 1/31/2024    Procedure: IRRIGATION & DEBRIDEMENT, WOUND DEBRIDEMENT;  Surgeon: Alvin Junior MD;  Location: [facility];  Service: General;  Laterality: Right;       Review of patient's allergies indicates:  No Known Allergies  Current Facility-Administered Medications   Medication Frequency    acetaminophen tablet 650 mg Q6H PRN    dextrose 50% injection 12.5 g PRN    dextrose 50% injection 25 g PRN    glucagon (human recombinant) injection 1 mg PRN    glucose chewable tablet 16 g PRN    glucose chewable tablet 24 g PRN    insulin aspart U-100 pen 0-10 Units Q6H PRN    naloxone 0.4 mg/mL injection 0.02 mg PRN     ondansetron injection 4 mg Q8H PRN    prochlorperazine injection Soln 5 mg Q6H PRN    sodium chloride 0.9% flush 10 mL PRN     Family History    None       Tobacco Use    Smoking status: Unknown    Smokeless tobacco: Not on file   Substance and Sexual Activity    Alcohol use: Not Currently    Drug use: Not on file    Sexual activity: Not on file     Review of Systems   Unable to perform ROS: Patient nonverbal     Objective:     Vital Signs (Most Recent):  Temp: 97.7 °F (36.5 °C) (01/31/25 0739)  Pulse: 103 (01/31/25 0739)  Resp: 16 (01/31/25 0739)  BP: 132/81 (01/31/25 0739)  SpO2: 95 % (01/31/25 0739) Vital Signs (24h Range):  Temp:  [97.6 °F (36.4 °C)-99.2 °F (37.3 °C)] 97.7 °F (36.5 °C)  Pulse:  [] 103  Resp:  [16-20] 16  SpO2:  [95 %-100 %] 95 %  BP: (103-152)/(64-84) 132/81     Weight: 105.8 kg (233 lb 4 oz) (01/31/25 0051)  Body mass index is 36.53 kg/m².  Body surface area is 2.24 meters squared.    No intake/output data recorded.     Physical Exam  Vitals and nursing note reviewed.   Constitutional:       General: She is not in acute distress.     Appearance: She is ill-appearing (chronically).   HENT:      Head: Normocephalic.   Pulmonary:      Effort: Pulmonary effort is normal. No respiratory distress.      Breath sounds: No wheezing or rales.   Chest:      Comments: R TDC in place, no surrounding erythema, drainage  Abdominal:      General: There is no distension.      Palpations: Abdomen is soft.   Musculoskeletal:      Right lower leg: No edema.      Left lower leg: No edema.   Skin:     General: Skin is warm and dry.   Neurological:      Mental Status: She is alert.          Significant Labs:  CBC:   Recent Labs   Lab 01/31/25 0246   WBC 5.96   RBC 4.92   HGB 13.2   HCT 41.6      MCV 85   MCH 26.8*   MCHC 31.7*     CMP:   Recent Labs   Lab 01/31/25 0246   GLU 65*   CALCIUM 10.7*   ALBUMIN 3.1*   PROT 8.4      K 3.6   CO2 24   CL 98   BUN 47*   CREATININE 5.4*   ALKPHOS 130   ALT  167*   *   BILITOT 0.4     All labs within the past 24 hours have been reviewed.

## 2025-01-31 NOTE — HPI
53 yo F with PMHx CVA resulting in nonverbal status with PEG, DM2, and ESRD on HD MWF who presents to the ED from [facility] for clogged PEG tube. Hx obtained via ED and NH due to non verbal status. Pt. Reportedly has had reflux of feeds/meds due and inability to flush PEG tube. She was sent to the ED here 1/27 for this issue, and the tube was able to be unclogged in ED. She was discharged back to [facility]. Per [facility], the patient continues to have issue with the tube repeatedly clogging which has led to inability to provide adequate nutrition and meds. In ED, pt. At baseline, following commands and nods no when asked if in any distress or pain.

## 2025-02-01 PROBLEM — R74.01 TRANSAMINITIS: Status: ACTIVE | Noted: 2025-02-01

## 2025-02-01 LAB
ALBUMIN SERPL BCP-MCNC: 3 G/DL (ref 3.5–5.2)
ALP SERPL-CCNC: 133 U/L (ref 40–150)
ALT SERPL W/O P-5'-P-CCNC: 178 U/L (ref 10–44)
ANION GAP SERPL CALC-SCNC: 17 MMOL/L (ref 8–16)
AST SERPL-CCNC: 138 U/L (ref 10–40)
BASOPHILS # BLD AUTO: 0.03 K/UL (ref 0–0.2)
BASOPHILS NFR BLD: 0.5 % (ref 0–1.9)
BILIRUB SERPL-MCNC: 0.4 MG/DL (ref 0.1–1)
BUN SERPL-MCNC: 64 MG/DL (ref 6–20)
CALCIUM SERPL-MCNC: 10.5 MG/DL (ref 8.7–10.5)
CHLORIDE SERPL-SCNC: 96 MMOL/L (ref 95–110)
CO2 SERPL-SCNC: 22 MMOL/L (ref 23–29)
CREAT SERPL-MCNC: 6.9 MG/DL (ref 0.5–1.4)
DIFFERENTIAL METHOD BLD: ABNORMAL
EOSINOPHIL # BLD AUTO: 0.4 K/UL (ref 0–0.5)
EOSINOPHIL NFR BLD: 6.1 % (ref 0–8)
ERYTHROCYTE [DISTWIDTH] IN BLOOD BY AUTOMATED COUNT: 16.5 % (ref 11.5–14.5)
EST. GFR  (NO RACE VARIABLE): 6.6 ML/MIN/1.73 M^2
GLUCOSE SERPL-MCNC: 98 MG/DL (ref 70–110)
HBV SURFACE AG SERPL QL IA: NORMAL
HCT VFR BLD AUTO: 36.8 % (ref 37–48.5)
HGB BLD-MCNC: 12.1 G/DL (ref 12–16)
IMM GRANULOCYTES # BLD AUTO: 0.01 K/UL (ref 0–0.04)
IMM GRANULOCYTES NFR BLD AUTO: 0.2 % (ref 0–0.5)
LYMPHOCYTES # BLD AUTO: 1.8 K/UL (ref 1–4.8)
LYMPHOCYTES NFR BLD: 28.5 % (ref 18–48)
MCH RBC QN AUTO: 27.8 PG (ref 27–31)
MCHC RBC AUTO-ENTMCNC: 32.9 G/DL (ref 32–36)
MCV RBC AUTO: 84 FL (ref 82–98)
MONOCYTES # BLD AUTO: 0.8 K/UL (ref 0.3–1)
MONOCYTES NFR BLD: 12.9 % (ref 4–15)
NEUTROPHILS # BLD AUTO: 3.2 K/UL (ref 1.8–7.7)
NEUTROPHILS NFR BLD: 51.8 % (ref 38–73)
NRBC BLD-RTO: 0 /100 WBC
PHOSPHATE SERPL-MCNC: 6.4 MG/DL (ref 2.7–4.5)
PLATELET # BLD AUTO: 214 K/UL (ref 150–450)
PMV BLD AUTO: 10.4 FL (ref 9.2–12.9)
POCT GLUCOSE: 101 MG/DL (ref 70–110)
POCT GLUCOSE: 117 MG/DL (ref 70–110)
POCT GLUCOSE: 130 MG/DL (ref 70–110)
POCT GLUCOSE: 142 MG/DL (ref 70–110)
POCT GLUCOSE: 95 MG/DL (ref 70–110)
POTASSIUM SERPL-SCNC: 3.9 MMOL/L (ref 3.5–5.1)
PROT SERPL-MCNC: 7.9 G/DL (ref 6–8.4)
RBC # BLD AUTO: 4.36 M/UL (ref 4–5.4)
SODIUM SERPL-SCNC: 135 MMOL/L (ref 136–145)
WBC # BLD AUTO: 6.2 K/UL (ref 3.9–12.7)

## 2025-02-01 PROCEDURE — 84100 ASSAY OF PHOSPHORUS: CPT | Performed by: HOSPITALIST

## 2025-02-01 PROCEDURE — 63600175 PHARM REV CODE 636 W HCPCS

## 2025-02-01 PROCEDURE — G0378 HOSPITAL OBSERVATION PER HR: HCPCS

## 2025-02-01 PROCEDURE — 36415 COLL VENOUS BLD VENIPUNCTURE: CPT | Performed by: HOSPITALIST

## 2025-02-01 PROCEDURE — 87521 HEPATITIS C PROBE&RVRS TRNSC: CPT | Performed by: STUDENT IN AN ORGANIZED HEALTH CARE EDUCATION/TRAINING PROGRAM

## 2025-02-01 PROCEDURE — 87340 HEPATITIS B SURFACE AG IA: CPT | Performed by: STUDENT IN AN ORGANIZED HEALTH CARE EDUCATION/TRAINING PROGRAM

## 2025-02-01 PROCEDURE — 25000003 PHARM REV CODE 250

## 2025-02-01 PROCEDURE — 80053 COMPREHEN METABOLIC PANEL: CPT | Performed by: HOSPITALIST

## 2025-02-01 PROCEDURE — 90935 HEMODIALYSIS ONE EVALUATION: CPT | Mod: ,,,

## 2025-02-01 PROCEDURE — G0257 UNSCHED DIALYSIS ESRD PT HOS: HCPCS

## 2025-02-01 PROCEDURE — 85025 COMPLETE CBC W/AUTO DIFF WBC: CPT | Performed by: HOSPITALIST

## 2025-02-01 PROCEDURE — 25000003 PHARM REV CODE 250: Performed by: STUDENT IN AN ORGANIZED HEALTH CARE EDUCATION/TRAINING PROGRAM

## 2025-02-01 RX ORDER — HEPARIN SODIUM 1000 [USP'U]/ML
1000 INJECTION, SOLUTION INTRAVENOUS; SUBCUTANEOUS
Status: COMPLETED | OUTPATIENT
Start: 2025-02-01 | End: 2025-02-01

## 2025-02-01 RX ORDER — SODIUM CHLORIDE 9 MG/ML
INJECTION, SOLUTION INTRAVENOUS ONCE
Status: COMPLETED | OUTPATIENT
Start: 2025-02-01 | End: 2025-02-01

## 2025-02-01 RX ADMIN — METOPROLOL TARTRATE 25 MG: 25 TABLET, FILM COATED ORAL at 09:02

## 2025-02-01 RX ADMIN — INSULIN GLARGINE 6 UNITS: 100 INJECTION, SOLUTION SUBCUTANEOUS at 09:02

## 2025-02-01 RX ADMIN — ASPIRIN 81 MG CHEWABLE TABLET 81 MG: 81 TABLET CHEWABLE at 11:02

## 2025-02-01 RX ADMIN — SODIUM CHLORIDE: 9 INJECTION, SOLUTION INTRAVENOUS at 08:02

## 2025-02-01 RX ADMIN — HEPARIN SODIUM 1000 UNITS: 1000 INJECTION, SOLUTION INTRAVENOUS; SUBCUTANEOUS at 11:02

## 2025-02-01 RX ADMIN — METOPROLOL TARTRATE 25 MG: 25 TABLET, FILM COATED ORAL at 11:02

## 2025-02-01 NOTE — ASSESSMENT & PLAN NOTE
LFTs persistently elevated since admission. No history of hep C documented but hep C positive this admit.   - HCV RNA ordered  - RUQUS ordered  - Patient will be referred to hepatology outpatient

## 2025-02-01 NOTE — ASSESSMENT & PLAN NOTE
Pt. Non-verbal at baseline, no acute issues.   - resume home ASA  - hold atorvastatin due to LFTs

## 2025-02-01 NOTE — PROGRESS NOTES
[facility] Observation [address] [place] [address] Medicine  Progress Note    Patient Name: Sarah Saravia  MRN: [id number]  Patient Class: OP- Observation   Admission Date: 1/30/2025  Length of Stay: 0 days  Attending Physician: Tianna Bro MD  Primary Care Provider: Deanna, Primary Doctor        Subjective     Principal Problem:PEG tube malfunction        HPI:  55 yo F with PMHx CVA resulting in nonverbal status with PEG, DM2, and ESRD on HD MWF who presents to the ED from [facility] for clogged PEG tube. Hx obtained via ED and NH due to non verbal status. Pt. Reportedly has had reflux of feeds/meds due and inability to flush PEG tube. She was sent to the ED here 1/27 for this issue, and the tube was able to be unclogged in ED. She was discharged back to [facility]. Per [facility], the patient continues to have issue with the tube repeatedly clogging which has led to inability to provide adequate nutrition and meds. In ED, pt. At baseline, following commands and nods no when asked if in any distress or pain.    Overview/Hospital Course:  PEG tube replaced by IR on 1/31. Unable to accommodate HD that day due to availability[facility]. [facility]. Expect DC back to long term NH Monday 2/3/25.     LFTs persistently elevated since admission. No history of hep C documented but hep C positive this admit. HCV RNA ordered. RUQUS ordered. Patient will be referred to hepatology outpatient.    Patient is stable for discharge.         Interval History: No acute events overnight. Patient is nonverbal. Nods yes/no. Denies pain.     Review of Systems  Objective:     Vital Signs (Most Recent):  Temp: 97.5 °F (36.4 °C) (02/01/25 0733)  Pulse: 93 (02/01/25 1000)  Resp: 16 (02/01/25 0733)  BP: 106/78 (02/01/25 1000)  SpO2: 99 % (02/01/25 0733) Vital Signs (24h Range):  Temp:  [97.5 °F (36.4 °C)-99.1 °F (37.3 °C)] 97.5 °F (36.4 °C)  Pulse:   [] 93  Resp:  [16-18] 16  SpO2:  [96 %-99 %] 99 %  BP: ()/(59-85) 106/78     Weight: 105.8 kg (233 lb 4 oz)  Body mass index is 36.53 kg/m².    Intake/Output Summary (Last 24 hours) at 2/1/2025 1108  Last data filed at 1/31/2025 1933  Gross per 24 hour   Intake 366 ml   Output --   Net 366 ml         Physical Exam  Vitals and nursing note reviewed.   Constitutional:       General: She is not in acute distress.     Appearance: She is well-developed. She is not ill-appearing.   HENT:      Head: Normocephalic and atraumatic.   Eyes:      General: No scleral icterus.     Pupils: Pupils are equal, round, and reactive to light.   Neck:      Vascular: No JVD.      Trachea: No tracheal deviation.   Cardiovascular:      Rate and Rhythm: Regular rhythm. Tachycardia present.      Heart sounds: Normal heart sounds. No murmur heard.     No friction rub. No gallop.   Pulmonary:      Effort: No respiratory distress.      Breath sounds: Normal breath sounds. No wheezing or rales.   Abdominal:      General: Bowel sounds are normal. There is no distension.      Palpations: Abdomen is soft. There is no mass.      Tenderness: There is no abdominal tenderness.      Comments: Peg tube in place   Musculoskeletal:         General: No deformity.      Cervical back: Neck supple.   Lymphadenopathy:      Cervical: No cervical adenopathy.   Skin:     General: Skin is warm and dry.      Findings: No rash.   Neurological:      Mental Status: She is alert. Mental status is at baseline.      Comments: Follows commands, nods yes/no             Significant Labs: All pertinent labs within the past 24 hours have been reviewed.  CBC:   Recent Labs   Lab 01/30/25 2337 01/31/25 0246 02/01/25 0315   WBC 6.17 5.96 6.20   HGB 12.3 13.2 12.1   HCT 38.8 41.6 36.8*    217 214     CMP:   Recent Labs   Lab 01/30/25 2337 01/31/25 0246 02/01/25 0315   * 137 135*   K 3.5 3.6 3.9   CL 96 98 96   CO2 26 24 22*   GLU 71 65* 98   BUN 43*  47* 64*   CREATININE 5.0* 5.4* 6.9*   CALCIUM 10.8* 10.7* 10.5   PROT  --  8.4 7.9   ALBUMIN  --  3.1* 3.0*   BILITOT  --  0.4 0.4   ALKPHOS  --  130 133   AST  --  128* 138*   ALT  --  167* 178*   ANIONGAP 12 15 17*       Significant Imaging: I have reviewed all pertinent imaging results/findings within the past 24 hours.    Assessment and Plan     * PEG tube malfunction  PEG tube with issue of recurrent clogging. Evaluated by general surgery who recommend replacement due to length of tube too short after being cut, now causing recurrent issues. S/p PEG tube replacement by IR on 1/31.     Transaminitis  LFTs persistently elevated since admission. No history of hep C documented but hep C positive this admit.   - HCV RNA ordered  - RUQUS ordered  - Patient will be referred to hepatology outpatient      ESRD (end stage renal disease) on dialysis  MWF  - nephrology consulted for routine HD while admitted    History of ischemic multifocal multiple vascular territories stroke  Pt. Non-verbal at baseline, no acute issues.   - resume home ASA  - hold atorvastatin due to LFTs      Type 2 diabetes mellitus with hyperglycemia, with long-term current use of insulin  Resume home lantus 6u qhs  - SSI q6h prn    Patient's FSGs are controlled on current medication regimen.  Last A1c reviewed-   Lab Results   Component Value Date    HGBA1C 5.5 01/30/2025     Most recent fingerstick glucose reviewed-   Recent Labs   Lab 01/31/25  1633 01/31/25  2026 02/01/25  0024 02/01/25  0635   POCTGLUCOSE 78 99 117* 130*     Current correctional scale  Medium  Maintain anti-hyperglycemic dose as follows-   Antihyperglycemics (From admission, onward)      Start     Stop Route Frequency Ordered    01/31/25 2100  insulin glargine U-100 (Lantus) pen 6 Units         -- SubQ Nightly 01/31/25 1456    01/30/25 2332  insulin aspart U-100 pen 0-10 Units         -- SubQ Every 6 hours PRN 01/30/25 2232          Hold Oral hypoglycemics while patient is in  the hospital.          VTE Risk Mitigation (From admission, onward)           Ordered     IP VTE HIGH RISK PATIENT  Once         01/30/25 2156     Place sequential compression device  Until discontinued         01/30/25 2156                    Discharge Planning   ZEKE: 2/3/2025     Code Status: Full Code   Medical Readiness for Discharge Date:   Discharge Plan A: Return to nursing home                        Tianna Bro MD  Department of Hospital Medicine   [facility] - Observation 11H

## 2025-02-01 NOTE — ASSESSMENT & PLAN NOTE
Resume home lantus 6u qhs  - SSI q6h prn    Patient's FSGs are controlled on current medication regimen.  Last A1c reviewed-   Lab Results   Component Value Date    HGBA1C 5.5 01/30/2025     Most recent fingerstick glucose reviewed-   Recent Labs   Lab 01/31/25  1633 01/31/25 2026 02/01/25  0024 02/01/25  0635   POCTGLUCOSE 78 99 117* 130*     Current correctional scale  Medium  Maintain anti-hyperglycemic dose as follows-   Antihyperglycemics (From admission, onward)      Start     Stop Route Frequency Ordered    01/31/25 2100  insulin glargine U-100 (Lantus) pen 6 Units         -- SubQ Nightly 01/31/25 1456    01/30/25 2332  insulin aspart U-100 pen 0-10 Units         -- SubQ Every 6 hours PRN 01/30/25 2232          Hold Oral hypoglycemics while patient is in the hospital.

## 2025-02-01 NOTE — SUBJECTIVE & OBJECTIVE
Interval History: No acute events overnight. Patient is nonverbal. Nods yes/no. Denies pain.     Review of Systems  Objective:     Vital Signs (Most Recent):  Temp: 97.5 °F (36.4 °C) (02/01/25 0733)  Pulse: 93 (02/01/25 1000)  Resp: 16 (02/01/25 0733)  BP: 106/78 (02/01/25 1000)  SpO2: 99 % (02/01/25 0733) Vital Signs (24h Range):  Temp:  [97.5 °F (36.4 °C)-99.1 °F (37.3 °C)] 97.5 °F (36.4 °C)  Pulse:  [] 93  Resp:  [16-18] 16  SpO2:  [96 %-99 %] 99 %  BP: ()/(59-85) 106/78     Weight: 105.8 kg (233 lb 4 oz)  Body mass index is 36.53 kg/m².    Intake/Output Summary (Last 24 hours) at 2/1/2025 1108  Last data filed at 1/31/2025 1933  Gross per 24 hour   Intake 366 ml   Output --   Net 366 ml         Physical Exam  Vitals and nursing note reviewed.   Constitutional:       General: She is not in acute distress.     Appearance: She is well-developed. She is not ill-appearing.   HENT:      Head: Normocephalic and atraumatic.   Eyes:      General: No scleral icterus.     Pupils: Pupils are equal, round, and reactive to light.   Neck:      Vascular: No JVD.      Trachea: No tracheal deviation.   Cardiovascular:      Rate and Rhythm: Regular rhythm. Tachycardia present.      Heart sounds: Normal heart sounds. No murmur heard.     No friction rub. No gallop.   Pulmonary:      Effort: No respiratory distress.      Breath sounds: Normal breath sounds. No wheezing or rales.   Abdominal:      General: Bowel sounds are normal. There is no distension.      Palpations: Abdomen is soft. There is no mass.      Tenderness: There is no abdominal tenderness.      Comments: Peg tube in place   Musculoskeletal:         General: No deformity.      Cervical back: Neck supple.   Lymphadenopathy:      Cervical: No cervical adenopathy.   Skin:     General: Skin is warm and dry.      Findings: No rash.   Neurological:      Mental Status: She is alert. Mental status is at baseline.      Comments: Follows commands, nods yes/no              Significant Labs: All pertinent labs within the past 24 hours have been reviewed.  CBC:   Recent Labs   Lab 01/30/25  2337 01/31/25  0246 02/01/25  0315   WBC 6.17 5.96 6.20   HGB 12.3 13.2 12.1   HCT 38.8 41.6 36.8*    217 214     CMP:   Recent Labs   Lab 01/30/25  2337 01/31/25  0246 02/01/25 0315   * 137 135*   K 3.5 3.6 3.9   CL 96 98 96   CO2 26 24 22*   GLU 71 65* 98   BUN 43* 47* 64*   CREATININE 5.0* 5.4* 6.9*   CALCIUM 10.8* 10.7* 10.5   PROT  --  8.4 7.9   ALBUMIN  --  3.1* 3.0*   BILITOT  --  0.4 0.4   ALKPHOS  --  130 133   AST  --  128* 138*   ALT  --  167* 178*   ANIONGAP 12 15 17*       Significant Imaging: I have reviewed all pertinent imaging results/findings within the past 24 hours.

## 2025-02-01 NOTE — CONSULTS
VASCULAR ACCESS NOTE       Bed:704/704 NADER CLARK consulted for PIV access.    On arrival to bedside, pt is refusing PIV insertion at this time.       Alex Owens RN

## 2025-02-01 NOTE — NURSING
Patient removed IV started by anesthesia. Multiple nurses attempted to regain IV access with no success. Negra Sweeney PA-C notified. Consult for PICC team ordered.

## 2025-02-01 NOTE — HOSPITAL COURSE
PEG tube replaced by IR on 1/31. Unable to accommodate HD that day due to availability. [facility] unable to provide unscheduled HD 2/1 as a substitute. Per CM, [facility] unable to take patient back over the weekend. Expect DC back to senior living NH Monday 2/3/25.     LFTs persistently elevated since admission. No history of hep C documented but hep C positive this admit. HCV RNA ordered. RUQUS ordered. Patient will be referred to hepatology outpatient.    Patient is stable for discharge.

## 2025-02-01 NOTE — ASSESSMENT & PLAN NOTE
PEG tube with issue of recurrent clogging. Evaluated by general surgery who recommend replacement due to length of tube too short after being cut, now causing recurrent issues. S/p PEG tube replacement by IR on 1/31.

## 2025-02-01 NOTE — PROGRESS NOTES
[facility]IS [facility]     Patient currently on hemodialysis for removal of uremic toxins and volume.     Patient seen and evaluated on hemodialysis, tolerating treatment, see HD flowsheet for vitals and assessments.     Labs have been reviewed and the dialysate bath has been adjusted.        Assessment/Plan:     -Patient ESRD, seen on HD, tolerating treatment well, w/o complaints   -UF goal of 1.5 L, patient currently receiving 55/hr TF  -IR exchanged PEG tube yesterday. Plan to discharge Monday back to [facility], as they will not accept patient back over the weekend. Will plan for HD Monday morning to resume MWF schedule and facilitate discharge back to NH.   -Renal diet, if not NPO   -Strict I/O's and daily weights  -Daily renal function panels  -Keep MAP >65 while on HD   -Hgb goal 10-11, hgb 12.1, at goal  -Ca chronically high, likely 2/2 immobility, PTH low, management per OP unit   -Check phos, not currently on binders  -Will continue to follow while inpatient      Estee Nichols PA-C  Nephrology

## 2025-02-01 NOTE — NURSING
Patient arrived  in bed .  Patient  didn't  have  central Line dressing on .Central line  dressing changed . Both lumen of  HD catheter  flushes good . Dialysis treatment started .   Report  received  from Primary Nurse .

## 2025-02-01 NOTE — NURSING
3.5 hours dialysis  treatment completed . Both lumens of  HD catheter were flushed , heparine locked and  wrapped  with tape and  gauze .   Report  given to Primary Nurse .

## 2025-02-02 LAB
ALBUMIN SERPL BCP-MCNC: 3.2 G/DL (ref 3.5–5.2)
ALP SERPL-CCNC: 138 U/L (ref 40–150)
ALT SERPL W/O P-5'-P-CCNC: 170 U/L (ref 10–44)
ANION GAP SERPL CALC-SCNC: 11 MMOL/L (ref 8–16)
AST SERPL-CCNC: 114 U/L (ref 10–40)
BASOPHILS # BLD AUTO: 0.03 K/UL (ref 0–0.2)
BASOPHILS NFR BLD: 0.5 % (ref 0–1.9)
BILIRUB SERPL-MCNC: 0.4 MG/DL (ref 0.1–1)
BUN SERPL-MCNC: 42 MG/DL (ref 6–20)
CALCIUM SERPL-MCNC: 10.5 MG/DL (ref 8.7–10.5)
CHLORIDE SERPL-SCNC: 102 MMOL/L (ref 95–110)
CO2 SERPL-SCNC: 22 MMOL/L (ref 23–29)
CREAT SERPL-MCNC: 5.3 MG/DL (ref 0.5–1.4)
DIFFERENTIAL METHOD BLD: ABNORMAL
EOSINOPHIL # BLD AUTO: 0.5 K/UL (ref 0–0.5)
EOSINOPHIL NFR BLD: 7.5 % (ref 0–8)
ERYTHROCYTE [DISTWIDTH] IN BLOOD BY AUTOMATED COUNT: 16.6 % (ref 11.5–14.5)
EST. GFR  (NO RACE VARIABLE): 9 ML/MIN/1.73 M^2
GLUCOSE SERPL-MCNC: 122 MG/DL (ref 70–110)
HCT VFR BLD AUTO: 37.4 % (ref 37–48.5)
HGB BLD-MCNC: 12 G/DL (ref 12–16)
IMM GRANULOCYTES # BLD AUTO: 0.02 K/UL (ref 0–0.04)
IMM GRANULOCYTES NFR BLD AUTO: 0.3 % (ref 0–0.5)
LYMPHOCYTES # BLD AUTO: 2.5 K/UL (ref 1–4.8)
LYMPHOCYTES NFR BLD: 38.3 % (ref 18–48)
MCH RBC QN AUTO: 27.3 PG (ref 27–31)
MCHC RBC AUTO-ENTMCNC: 32.1 G/DL (ref 32–36)
MCV RBC AUTO: 85 FL (ref 82–98)
MONOCYTES # BLD AUTO: 0.7 K/UL (ref 0.3–1)
MONOCYTES NFR BLD: 10.7 % (ref 4–15)
NEUTROPHILS # BLD AUTO: 2.8 K/UL (ref 1.8–7.7)
NEUTROPHILS NFR BLD: 42.7 % (ref 38–73)
NRBC BLD-RTO: 0 /100 WBC
PLATELET # BLD AUTO: 201 K/UL (ref 150–450)
PMV BLD AUTO: 9.9 FL (ref 9.2–12.9)
POCT GLUCOSE: 122 MG/DL (ref 70–110)
POCT GLUCOSE: 131 MG/DL (ref 70–110)
POCT GLUCOSE: 133 MG/DL (ref 70–110)
POCT GLUCOSE: 141 MG/DL (ref 70–110)
POTASSIUM SERPL-SCNC: 3.8 MMOL/L (ref 3.5–5.1)
PROT SERPL-MCNC: 8.5 G/DL (ref 6–8.4)
RBC # BLD AUTO: 4.39 M/UL (ref 4–5.4)
SODIUM SERPL-SCNC: 135 MMOL/L (ref 136–145)
WBC # BLD AUTO: 6.53 K/UL (ref 3.9–12.7)

## 2025-02-02 PROCEDURE — 25000003 PHARM REV CODE 250: Performed by: STUDENT IN AN ORGANIZED HEALTH CARE EDUCATION/TRAINING PROGRAM

## 2025-02-02 PROCEDURE — 85025 COMPLETE CBC W/AUTO DIFF WBC: CPT | Performed by: HOSPITALIST

## 2025-02-02 PROCEDURE — 80053 COMPREHEN METABOLIC PANEL: CPT | Performed by: HOSPITALIST

## 2025-02-02 PROCEDURE — 36415 COLL VENOUS BLD VENIPUNCTURE: CPT | Performed by: HOSPITALIST

## 2025-02-02 PROCEDURE — G0378 HOSPITAL OBSERVATION PER HR: HCPCS

## 2025-02-02 RX ADMIN — ASPIRIN 81 MG CHEWABLE TABLET 81 MG: 81 TABLET CHEWABLE at 08:02

## 2025-02-02 RX ADMIN — METOPROLOL TARTRATE 25 MG: 25 TABLET, FILM COATED ORAL at 08:02

## 2025-02-02 RX ADMIN — INSULIN GLARGINE 6 UNITS: 100 INJECTION, SOLUTION SUBCUTANEOUS at 08:02

## 2025-02-02 NOTE — PROGRESS NOTES
[facility] - Observation [address] [place] [address] Medicine  Progress Note    Patient Name: Sarah Saravia  MRN: [id number]  Patient Class: OP- Observation   Admission Date: 1/30/2025  Length of Stay: 0 days  Attending Physician: Tianna Bro MD  Primary Care Provider: Deanna, Primary Doctor        Subjective     Principal Problem:PEG tube malfunction        HPI:  55 yo F with PMHx CVA resulting in nonverbal status with PEG, DM2, and ESRD on HD MWF who presents to the ED from [facility] for clogged PEG tube. Hx obtained via ED and NH due to non verbal status. Pt. Reportedly has had reflux of feeds/meds due and inability to flush PEG tube. She was sent to the ED here 1/27 for this issue, and the tube was able to be unclogged in ED. She was discharged back to [facility]. Per [facility], the patient continues to have issue with the tube repeatedly clogging which has led to inability to provide adequate nutrition and meds. In ED, pt. At baseline, following commands and nods no when asked if in any distress or pain.    Overview/Hospital Course:  PEG tube replaced by IR on 1/31. Unable to accommodate HD that day due to availability. [facility]sched[facility]1 as a substitute. [facility]. Expect DC back to long term NH Monday 2/3/25.     LFTs persistently elevated since admission. No history of hep C documented but hep C positive this admit. HCV RNA ordered. RUQUS ordered. Patient will be referred to hepatology outpatient.    Patient is stable for discharge.         Interval History: No acute events overnight. Patient is nonverbal. Did not want to participate in interview this morning.     Review of Systems  Objective:     Vital Signs (Most Recent):  Temp: 98.3 °F (36.8 °C) (02/02/25 0804)  Pulse: 94 (02/02/25 0804)  Resp: 17 (02/02/25 0804)  BP: 96/67 (02/02/25 0804)  SpO2: 99 % (02/02/25 0804) Vital Signs (24h Range):  Temp:  [97.4 °F (36.3 °C)-99 °F (37.2 °C)]  98.3 °F (36.8 °C)  Pulse:  [85-96] 94  Resp:  [16-18] 17  SpO2:  [95 %-100 %] 99 %  BP: ()/(59-87) 96/67     Weight: 105.8 kg (233 lb 4 oz)  Body mass index is 36.53 kg/m².    Intake/Output Summary (Last 24 hours) at 2/2/2025 0924  Last data filed at 2/2/2025 0630  Gross per 24 hour   Intake 2277.1 ml   Output 2150 ml   Net 127.1 ml         Physical Exam  Vitals and nursing note reviewed.   Constitutional:       General: She is not in acute distress.     Appearance: She is well-developed. She is not ill-appearing.   HENT:      Head: Normocephalic and atraumatic.   Eyes:      General: No scleral icterus.     Pupils: Pupils are equal, round, and reactive to light.   Neck:      Vascular: No JVD.      Trachea: No tracheal deviation.   Cardiovascular:      Rate and Rhythm: Regular rhythm. Tachycardia present.      Heart sounds: Normal heart sounds. No murmur heard.     No friction rub. No gallop.   Pulmonary:      Effort: No respiratory distress.      Breath sounds: Normal breath sounds. No wheezing or rales.   Abdominal:      General: Bowel sounds are normal. There is no distension.      Palpations: Abdomen is soft. There is no mass.      Tenderness: There is no abdominal tenderness.      Comments: Peg tube in place   Musculoskeletal:         General: No deformity.      Cervical back: Neck supple.   Lymphadenopathy:      Cervical: No cervical adenopathy.   Skin:     General: Skin is warm and dry.      Findings: No rash.   Neurological:      Mental Status: She is alert. Mental status is at baseline.      Comments: Follows commands, nods yes/no             Significant Labs: All pertinent labs within the past 24 hours have been reviewed.  CBC:   Recent Labs   Lab 02/01/25  0315 02/02/25  0547   WBC 6.20 6.53   HGB 12.1 12.0   HCT 36.8* 37.4    201     CMP:   Recent Labs   Lab 02/01/25  0315 02/02/25  0547   * 135*   K 3.9 3.8   CL 96 102   CO2 22* 22*   GLU 98 122*   BUN 64* 42*   CREATININE 6.9* 5.3*    CALCIUM 10.5 10.5   PROT 7.9 8.5*   ALBUMIN 3.0* 3.2*   BILITOT 0.4 0.4   ALKPHOS 133 138   * 114*   * 170*   ANIONGAP 17* 11       Significant Imaging: I have reviewed all pertinent imaging results/findings within the past 24 hours.    Assessment and Plan     * PEG tube malfunction  PEG tube with issue of recurrent clogging. Evaluated by general surgery who recommend replacement due to length of tube too short after being cut, now causing recurrent issues. S/p PEG tube replacement by IR on 1/31.     Transaminitis  LFTs persistently elevated since admission. No history of hep C documented but hep C positive this admit.   - HCV RNA pending  - RUQUS similar to previous   - Patient will be referred to hepatology outpatient      ESRD (end stage renal disease) on dialysis  MWF  - nephrology consulted for routine HD while admitted    History of ischemic multifocal multiple vascular territories stroke  Pt. Non-verbal at baseline, no acute issues.   - resume home ASA  - hold atorvastatin due to LFTs      Type 2 diabetes mellitus with hyperglycemia, with long-term current use of insulin  Resume home lantus 6u qhs  - SSI q6h prn    Patient's FSGs are controlled on current medication regimen.  Last A1c reviewed-   Lab Results   Component Value Date    HGBA1C 5.5 01/30/2025     Most recent fingerstick glucose reviewed-   Recent Labs   Lab 02/01/25  1117 02/01/25  1610 02/01/25  2109 02/02/25  0612   POCTGLUCOSE 142* 101 95 141*       Current correctional scale  Medium  Maintain anti-hyperglycemic dose as follows-   Antihyperglycemics (From admission, onward)      Start     Stop Route Frequency Ordered    01/31/25 2100  insulin glargine U-100 (Lantus) pen 6 Units         -- SubQ Nightly 01/31/25 1456    01/30/25 2332  insulin aspart U-100 pen 0-10 Units         -- SubQ Every 6 hours PRN 01/30/25 2232          Hold Oral hypoglycemics while patient is in the hospital.          VTE Risk Mitigation (From admission,  onward)           Ordered     IP VTE HIGH RISK PATIENT  Once         01/30/25 2156     Place sequential compression device  Until discontinued         01/30/25 2156                    Discharge Planning   ZEKE: 2/3/2025     Code Status: Full Code   Medical Readiness for Discharge Date: 2/1/2025  Discharge Plan A: Return to nursing home                        Tianna Bro MD  Department of Hospital Medicine   [facility] - Observation 11H

## 2025-02-02 NOTE — SUBJECTIVE & OBJECTIVE
Interval History: No acute events overnight. Patient is nonverbal. Did not want to participate in interview this morning.     Review of Systems  Objective:     Vital Signs (Most Recent):  Temp: 98.3 °F (36.8 °C) (02/02/25 0804)  Pulse: 94 (02/02/25 0804)  Resp: 17 (02/02/25 0804)  BP: 96/67 (02/02/25 0804)  SpO2: 99 % (02/02/25 0804) Vital Signs (24h Range):  Temp:  [97.4 °F (36.3 °C)-99 °F (37.2 °C)] 98.3 °F (36.8 °C)  Pulse:  [85-96] 94  Resp:  [16-18] 17  SpO2:  [95 %-100 %] 99 %  BP: ()/(59-87) 96/67     Weight: 105.8 kg (233 lb 4 oz)  Body mass index is 36.53 kg/m².    Intake/Output Summary (Last 24 hours) at 2/2/2025 0924  Last data filed at 2/2/2025 0630  Gross per 24 hour   Intake 2277.1 ml   Output 2150 ml   Net 127.1 ml         Physical Exam  Vitals and nursing note reviewed.   Constitutional:       General: She is not in acute distress.     Appearance: She is well-developed. She is not ill-appearing.   HENT:      Head: Normocephalic and atraumatic.   Eyes:      General: No scleral icterus.     Pupils: Pupils are equal, round, and reactive to light.   Neck:      Vascular: No JVD.      Trachea: No tracheal deviation.   Cardiovascular:      Rate and Rhythm: Regular rhythm. Tachycardia present.      Heart sounds: Normal heart sounds. No murmur heard.     No friction rub. No gallop.   Pulmonary:      Effort: No respiratory distress.      Breath sounds: Normal breath sounds. No wheezing or rales.   Abdominal:      General: Bowel sounds are normal. There is no distension.      Palpations: Abdomen is soft. There is no mass.      Tenderness: There is no abdominal tenderness.      Comments: Peg tube in place   Musculoskeletal:         General: No deformity.      Cervical back: Neck supple.   Lymphadenopathy:      Cervical: No cervical adenopathy.   Skin:     General: Skin is warm and dry.      Findings: No rash.   Neurological:      Mental Status: She is alert. Mental status is at baseline.      Comments:  Follows commands, nods yes/no             Significant Labs: All pertinent labs within the past 24 hours have been reviewed.  CBC:   Recent Labs   Lab 02/01/25 0315 02/02/25  0547   WBC 6.20 6.53   HGB 12.1 12.0   HCT 36.8* 37.4    201     CMP:   Recent Labs   Lab 02/01/25 0315 02/02/25  0547   * 135*   K 3.9 3.8   CL 96 102   CO2 22* 22*   GLU 98 122*   BUN 64* 42*   CREATININE 6.9* 5.3*   CALCIUM 10.5 10.5   PROT 7.9 8.5*   ALBUMIN 3.0* 3.2*   BILITOT 0.4 0.4   ALKPHOS 133 138   * 114*   * 170*   ANIONGAP 17* 11       Significant Imaging: I have reviewed all pertinent imaging results/findings within the past 24 hours.

## 2025-02-02 NOTE — CONSULTS
VASCULAR ACCESS NOTE       Bed:704/704 A    20g x 1.75in PIV placed in Right Forearm by EDUARDO. Needle advanced into the vessel under real time ultrasound guidance.    Attempts: 1      Alex Owens RN

## 2025-02-02 NOTE — PLAN OF CARE
Problem: Skin Injury Risk Increased  Goal: Skin Health and Integrity  Outcome: Progressing     Problem: Adult Inpatient Plan of Care  Goal: Plan of Care Review  Outcome: Progressing  Goal: Patient-Specific Goal (Individualized)  Outcome: Progressing  Goal: Absence of Hospital-Acquired Illness or Injury  Outcome: Progressing  Goal: Optimal Comfort and Wellbeing  Outcome: Progressing     Problem: Diabetes Comorbidity  Goal: Blood Glucose Level Within Targeted Range  Outcome: Progressing     Problem: Infection  Goal: Absence of Infection Signs and Symptoms  Outcome: Progressing     Problem: Wound  Goal: Optimal Wound Healing  Outcome: Progressing

## 2025-02-02 NOTE — ASSESSMENT & PLAN NOTE
LFTs persistently elevated since admission. No history of hep C documented but hep C positive this admit.   - HCV RNA pending  - RUQUS similar to previous   - Patient will be referred to hepatology outpatient

## 2025-02-02 NOTE — ASSESSMENT & PLAN NOTE
Resume home lantus 6u qhs  - SSI q6h prn    Patient's FSGs are controlled on current medication regimen.  Last A1c reviewed-   Lab Results   Component Value Date    HGBA1C 5.5 01/30/2025     Most recent fingerstick glucose reviewed-   Recent Labs   Lab 02/01/25  1117 02/01/25  1610 02/01/25  2109 02/02/25  0612   POCTGLUCOSE 142* 101 95 141*       Current correctional scale  Medium  Maintain anti-hyperglycemic dose as follows-   Antihyperglycemics (From admission, onward)    Start     Stop Route Frequency Ordered    01/31/25 2100  insulin glargine U-100 (Lantus) pen 6 Units         -- SubQ Nightly 01/31/25 1456    01/30/25 2332  insulin aspart U-100 pen 0-10 Units         -- SubQ Every 6 hours PRN 01/30/25 2232        Hold Oral hypoglycemics while patient is in the hospital.

## 2025-02-03 VITALS
WEIGHT: 236.31 LBS | OXYGEN SATURATION: 97 % | DIASTOLIC BLOOD PRESSURE: 85 MMHG | SYSTOLIC BLOOD PRESSURE: 123 MMHG | HEIGHT: 67 IN | HEART RATE: 100 BPM | BODY MASS INDEX: 37.09 KG/M2 | TEMPERATURE: 98 F | RESPIRATION RATE: 16 BRPM

## 2025-02-03 LAB
ALBUMIN SERPL BCP-MCNC: 3.1 G/DL (ref 3.5–5.2)
ALP SERPL-CCNC: 144 U/L (ref 40–150)
ALT SERPL W/O P-5'-P-CCNC: 137 U/L (ref 10–44)
ANION GAP SERPL CALC-SCNC: 15 MMOL/L (ref 8–16)
AST SERPL-CCNC: 81 U/L (ref 10–40)
BILIRUB SERPL-MCNC: 0.4 MG/DL (ref 0.1–1)
BUN SERPL-MCNC: 67 MG/DL (ref 6–20)
CALCIUM SERPL-MCNC: 11.5 MG/DL (ref 8.7–10.5)
CHLORIDE SERPL-SCNC: 100 MMOL/L (ref 95–110)
CO2 SERPL-SCNC: 22 MMOL/L (ref 23–29)
CREAT SERPL-MCNC: 6.9 MG/DL (ref 0.5–1.4)
EST. GFR  (NO RACE VARIABLE): 6.6 ML/MIN/1.73 M^2
GLUCOSE SERPL-MCNC: 117 MG/DL (ref 70–110)
POCT GLUCOSE: 104 MG/DL (ref 70–110)
POCT GLUCOSE: 118 MG/DL (ref 70–110)
POCT GLUCOSE: 132 MG/DL (ref 70–110)
POTASSIUM SERPL-SCNC: 4.1 MMOL/L (ref 3.5–5.1)
PROT SERPL-MCNC: 8.4 G/DL (ref 6–8.4)
SODIUM SERPL-SCNC: 137 MMOL/L (ref 136–145)

## 2025-02-03 PROCEDURE — 80053 COMPREHEN METABOLIC PANEL: CPT

## 2025-02-03 PROCEDURE — 90935 HEMODIALYSIS ONE EVALUATION: CPT | Mod: ,,, | Performed by: NURSE PRACTITIONER

## 2025-02-03 PROCEDURE — G0378 HOSPITAL OBSERVATION PER HR: HCPCS

## 2025-02-03 PROCEDURE — G0257 UNSCHED DIALYSIS ESRD PT HOS: HCPCS

## 2025-02-03 PROCEDURE — 63600175 PHARM REV CODE 636 W HCPCS: Performed by: NURSE PRACTITIONER

## 2025-02-03 PROCEDURE — 25000003 PHARM REV CODE 250: Performed by: STUDENT IN AN ORGANIZED HEALTH CARE EDUCATION/TRAINING PROGRAM

## 2025-02-03 RX ORDER — HEPARIN SODIUM 1000 [USP'U]/ML
1000 INJECTION, SOLUTION INTRAVENOUS; SUBCUTANEOUS
Status: DISCONTINUED | OUTPATIENT
Start: 2025-02-03 | End: 2025-02-03 | Stop reason: HOSPADM

## 2025-02-03 RX ORDER — SODIUM CHLORIDE 9 MG/ML
INJECTION, SOLUTION INTRAVENOUS ONCE
Status: DISCONTINUED | OUTPATIENT
Start: 2025-02-03 | End: 2025-02-03 | Stop reason: HOSPADM

## 2025-02-03 RX ADMIN — HEPARIN SODIUM 1000 UNITS: 1000 INJECTION, SOLUTION INTRAVENOUS; SUBCUTANEOUS at 11:02

## 2025-02-03 RX ADMIN — METOPROLOL TARTRATE 25 MG: 25 TABLET, FILM COATED ORAL at 12:02

## 2025-02-03 RX ADMIN — ASPIRIN 81 MG CHEWABLE TABLET 81 MG: 81 TABLET CHEWABLE at 12:02

## 2025-02-03 NOTE — PLAN OF CARE
[facility] 11H  Discharge Final Note    Primary Care Provider: Deanna, Primary Doctor    Expected Discharge Date: 2/3/2025    Patient discharged to [facility] via ambulance transportation.     Patient's bedside nurse and patient notified of the above.    Discharge Plan A and Plan B have been determined by review of patient's clinical status, future medical and therapeutic needs, and coverage/benefits for post-acute care in coordination with multidisciplinary team members.        Final Discharge Note (most recent)       Final Note - 02/03/25 1559          Final Note    Assessment Type Final Discharge Note (P)      Anticipated Discharge Disposition [facility] Nursing Facility (P)         Post-Acute Status    Post-Acute Authorization Placement (P)      Post-Acute Placement Status Set-up Complete/Auth obtained (P)                      Important Message from Medicare                 Future Appointments   Date Time Provider Department Center   2/10/2025  9:20 AM Ellis [facility] MD [facility]charge follow-up appointment and information added to AVS.     Daylin Xavier LMSW  [facility]  Ext. [phone]

## 2025-02-03 NOTE — ASSESSMENT & PLAN NOTE
Resume home lantus 6u qhs  - SSI q6h prn    Patient's FSGs are controlled on current medication regimen.  Last A1c reviewed-   Lab Results   Component Value Date    HGBA1C 5.5 01/30/2025     Most recent fingerstick glucose reviewed-   Recent Labs   Lab 02/02/25 2039 02/03/25  0038 02/03/25  0620 02/03/25  0718   POCTGLUCOSE 122* 104 118* 132*     Current correctional scale  Medium  Maintain anti-hyperglycemic dose as follows-   Antihyperglycemics (From admission, onward)      Start     Stop Route Frequency Ordered    01/31/25 2100  insulin glargine U-100 (Lantus) pen 6 Units         -- SubQ Nightly 01/31/25 1456    01/30/25 2332  insulin aspart U-100 pen 0-10 Units         -- SubQ Every 6 hours PRN 01/30/25 2232          Hold Oral hypoglycemics while patient is in the hospital.

## 2025-02-03 NOTE — NURSING
Hd complete. 3.5hr 1.8L removed. Pt did clot half way through treatemnt, all blood able to be returned. CVC heparin locked and dressing changed. Report given to kimber BARBER. Pt left [facility] via bed with transport.

## 2025-02-03 NOTE — PROGRESS NOTES
[facility] HEMODIALYSIS NOTE     Patient currently on hemodialysis for removal of uremic toxins and volume.     Patient seen and evaluated on hemodialysis, tolerating treatment, see HD flowsheet for vitals and assessments.    Labs have been reviewed and the dialysate bath has been adjusted.       Assessment/Plan:    ESRD  -Plan for discharge today   -S/p PEG tube replacement on 1/31  -Patient seen on HD, tolerating treatment well, w/o complaints   -UF goal of 2L  -Renal diet, if not NPO   -Strict I/O's and daily weights  -Daily renal function panels  -Keep MAP >65 while on HD   -Hgb goal 10-11, hgb 12.0  -Will continue to follow while inpatient     Delfina Shi DNP-FNP, C  Nephrology  Pager: [phone]

## 2025-02-03 NOTE — PROGRESS NOTES
"[facility] - [facility] 11H  Adult Nutrition  Progress Note    SUMMARY       Recommendations    1.) TF recs: recommend continuing with Nepro @ 55ml/hr to provide 2376 kcal, 107g PRO, 213g CHO, and 959ml FF- FWF per MD (meets 116% of EEN and 97% of EPN).     2.) Avoid holding TF for residuals less than 500mL unless associated with other s/s of intolerance such as N/V/D, abd pain/distention.     3.) RD to monitor wt, tolerance, skin, labs.        Goals: to meet % of EEN/EPN by next RD f/u  Nutrition Goal Status: new  Communication of RD Recs:  (POC)    Assessment and Plan    No nutritional dx at this time    Malnutrition Assessment    Pending NFPE    Reason for Assessment    Reason For Assessment: new tube feeding  Diagnosis: other (see comments) (PEG tube malfunction)    General Information Comments: Pt's chart was screened for new TF. PMHx: CVA resulting in nonverbal status with PEG, DM2, and ESRD on HD MWF. Pt is currently URIEL, in HD. TF running at the ordered rate. No s/s of intolerance noted, per RN staff. Per chart review, RD noted insignificant wt loss of -17% x 11- 12 months. RD to perform NFPE at f/u if medically warranted. RD team to continue to monitor and f/u.     Nutrition Discharge Planning: adequate intake via EN    Nutrition Related Social Determinants of Health: SDOH: Unable to assess at this time.      Food Insecurity: Patient Declined (1/31/2025)    Hunger Vital Sign     Worried About Running Out of Food in the Last Year: Patient declined     Ran Out of Food in the Last Year: Patient declined      Nutrition/Diet History    Nutrition Intake History: No orral intake; PEG dependent  Spiritual, Cultural Beliefs, Holiness Practices, Values that Affect Care: no  Food Allergies: NKFA  Factors Affecting Nutritional Intake: NPO    Anthropometrics    Height: 5' 7" (170.2 cm)  Height (inches): 67 in  Height Method: Stated  Weight: 107.2 kg (236 lb 5.3 oz)  Weight (lb): 236.34 lb  Weight Method: Bed " Scale  Ideal Body Weight (IBW), Female: 135 lb  % Ideal Body Weight, Female (lb): 172.78 %  BMI (Calculated): 37    Lab/Procedures/Meds    Pertinent Labs Reviewed: reviewed  Pertinent Labs Comments: 2/2: Na: 135, BUN: 42, cr: 5.3, GFR: 9.0, gluc: 122, AST: 114, ALT: 170    Pertinent Medications Reviewed: reviewed  Pertinent Medications Comments: Insulin    Estimated/Assessed Needs    Weight Used For Calorie Calculations: 107.2 kg (236 lb 5.3 oz)  Energy Calorie Requirements (kcal): 2048 kcal  Energy Need Method: [place]-St Jeor (MSJ x 1.2)    Protein Requirements: 123g (2.0g/kg of IBW))  Weight Used For Protein Calculations: 61.2 kg (135 lb)    Fluid Requirements (mL): as per MD or RDA  Estimated Fluid Requirement Method: RDA Method  RDA Method (mL): 2048    CHO Requirement: 191g    Nutrition Prescription Ordered    Current Diet Order: NPO  Current Nutrition Support Formula Ordered: Nepro  Current Nutrition Support Rate Ordered: 55 (ml)  Current Nutrition Support Frequency Ordered: continuous    Evaluation of Received Nutrient/Fluid Intake    Enteral Calories (kcal): 2376  Enteral Protein (gm): 107  Enteral (Free Water) Fluid (mL): 959  I/O: +1.4L since admit  Energy Calories Required: meeting needs  Protein Required: meeting needs  Fluid Required:  (as per MD)  Comments: LBM 2/2 (loose)  Tolerance: tolerating  % Intake of Estimated Energy Needs: 75 - 100 %  % Meal Intake: NPO    Nutrition Risk    Level of Risk/Frequency of Follow-up: low     Monitor and Evaluation    Food and Nutrient Intake: enteral nutrition intake  Food and Nutrient Adminstration: enteral and parenteral nutrition administration  Physical Activity and Function: nutrition-related ADLs and IADLs  Anthropometric Measurements: weight, weight change, body mass index  Biochemical Data, Medical Tests and Procedures: electrolyte and renal panel, gastrointestinal profile, glucose/endocrine profile, inflammatory profile, lipid profile  Nutrition-Focused  Physical Findings: overall appearance, skin     Nutrition Follow-Up    RD Follow-up?: Yes

## 2025-02-03 NOTE — DISCHARGE SUMMARY
[facility] - Observation [address] [place] [address] Medicine  Discharge Summary      Patient Name: Sarah Saravia  MRN: [id number]  JOSELIN: [id number]  Patient Class: OP- Observation  Admission Date: 1/30/2025  Hospital Length of Stay: 0 days  Discharge Date and Time: 2/3/2025  3:05 PM  Attending Physician: Tianna Bro MD   Discharging Provider: Tianna Bro MD  Primary Care Provider: Deanna Primary Doctor  [facility] Medicine Team: [facility] HOSP MED G Tianna Bro MD  Primary Care Team: [facility] HOSP MED G    HPI:   53 yo F with PMHx CVA resulting in nonverbal status with PEG, DM2, and ESRD on HD MWF who presents to the ED from [facility] for clogged PEG tube. Hx obtained via ED and NH due to non verbal status. Pt. Reportedly has had reflux of feeds/meds due and inability to flush PEG tube. She was sent to the ED here 1/27 for this issue, and the tube was able to be unclogged in ED. She was discharged back to [facility]. Per [facility], the patient continues to have issue with the tube repeatedly clogging which has led to inability to provide adequate nutrition and meds. In ED, pt. At baseline, following commands and nods no when asked if in any distress or pain.    * No surgery found *      Hospital Course:   PEG tube replaced by IR on 1/31. Unable to accommodate HD that day due to availability. [facility]. [facility]3/25.     LFTs persistently elevated since admission. No history of hep C documented but hep C positive this admit. HCV RNA ordered. RUQUS similar to previous. Patient will be referred to hepatology outpatient.    Patient is stable for discharge.          Goals of Care Treatment Preferences:  Code Status: Full Code          What is most important right now is to focus on curative/life-prolongation (regardless of treatment burdens).  Accordingly, we have decided that the  best plan to meet the patient's goals includes continuing with treatment.      SDOH Screening:  The patient declined to be screened for utility difficulties, food insecurity, transport difficulties, housing insecurity, and interpersonal safety, so no concerns could be identified this admission.     Consults:   Consults (From admission, onward)          Status Ordering Provider     [facility]  Once        Provider:  (Not yet assigned)    Completed JANICE CEBALLOS     Inpatient [facility] to [facility] team [facility])  Once        Provider:  (Not yet assigned)    Completed JANICE CEBALLOS     Inpatient consult to Nephrology  Once        Provider:  (Not yet assigned)    Completed TRAVIS OQUENDO     Inpatient consult to Interventional Radiology  Once        Provider:  (Not yet assigned)    Completed TRAVIS OQUENDO     Inpatient consult to General Surgery  Once        Provider:  (Not yet assigned)    Completed HEATHER PÉREZ          Physical Exam  Vitals and nursing note reviewed.   Constitutional:       General: She is not in acute distress.     Appearance: She is well-developed. She is not ill-appearing.   HENT:      Head: Normocephalic and atraumatic.   Eyes:      General: No scleral icterus.     Pupils: Pupils are equal, round, and reactive to light.   Neck:      Vascular: No JVD.      Trachea: No tracheal deviation.   Cardiovascular:      Rate and Rhythm: Regular rhythm. Tachycardia present.      Heart sounds: Normal heart sounds. No murmur heard.     No friction rub. No gallop.   Pulmonary:      Effort: No respiratory distress.      Breath sounds: Normal breath sounds. No wheezing or rales.   Abdominal:      General: Bowel sounds are normal. There is no distension.      Palpations: Abdomen is soft. There is no mass.      Tenderness: There is no abdominal tenderness.      Comments: Peg tube in place   Musculoskeletal:         General: No deformity.      Cervical back: Neck supple.    Lymphadenopathy:      Cervical: No cervical adenopathy.   Skin:     General: Skin is warm and dry.      Findings: No rash.   Neurological:      Mental Status: She is alert. Mental status is at baseline.      Comments: Follows commands, nods yes/no     * PEG tube malfunction  PEG tube with issue of recurrent clogging. Evaluated by general surgery who recommend replacement due to length of tube too short after being cut, now causing recurrent issues. S/p PEG tube replacement by IR on 1/31.     Transaminitis  LFTs persistently elevated since admission. No history of hep C documented but hep C positive this admit.   - HCV RNA pending  - RUQUS similar to previous   - Patient will be referred to hepatology outpatient      ESRD (end stage renal disease) on dialysis  MWF  - nephrology consulted for routine HD while admitted    History of ischemic multifocal multiple vascular territories stroke  Pt. Non-verbal at baseline, no acute issues.   - resume home ASA  - hold atorvastatin due to LFTs      Type 2 diabetes mellitus with hyperglycemia, with long-term current use of insulin  Resume home lantus 6u qhs  - SSI q6h prn    Patient's FSGs are controlled on current medication regimen.  Last A1c reviewed-   Lab Results   Component Value Date    HGBA1C 5.5 01/30/2025     Most recent fingerstick glucose reviewed-   Recent Labs   Lab 02/02/25  2039 02/03/25  0038 02/03/25  0620 02/03/25  0718   POCTGLUCOSE 122* 104 118* 132*     Current correctional scale  Medium  Maintain anti-hyperglycemic dose as follows-   Antihyperglycemics (From admission, onward)      Start     Stop Route Frequency Ordered    01/31/25 2100  insulin glargine U-100 (Lantus) pen 6 Units         -- SubQ Nightly 01/31/25 1456    01/30/25 2332  insulin aspart U-100 pen 0-10 Units         -- SubQ Every 6 hours PRN 01/30/25 2232          Hold Oral hypoglycemics while patient is in the hospital.          Final Active Diagnoses:    Diagnosis Date Noted POA     "PRINCIPAL PROBLEM:  PEG tube malfunction [K94.23] 01/30/2025 Yes    Transaminitis [R74.01] 02/01/2025 Yes    ESRD (end stage renal disease) on dialysis [N18.6, Z99.2] 04/10/2024 Not Applicable    History of ischemic multifocal multiple vascular territories stroke [Z86.73] 02/29/2024 Not Applicable    Type 2 diabetes mellitus with hyperglycemia, with long-term current use of insulin [E11.65, Z79.4] 02/02/2024 Not Applicable      Problems Resolved During this Admission:       Discharged Condition: stable    Disposition:     Follow Up:    Patient Instructions:   No discharge procedures on file.    Significant Diagnostic Studies: N/A    Pending Diagnostic Studies:       Procedure Component Value Units Date/Time    Comprehensive metabolic panel [[id number]] Collected: 02/03/25 0749    Order Status: Sent Lab Status: In process Updated: 02/03/25 0902    Specimen: Blood     HCV RNA Qualitative [[id number]] Collected: 02/01/25 1114    Order Status: Sent Lab Status: In process Updated: 02/01/25 1127    Specimen: Blood            Medications:  Reconciled Home Medications:      Medication List        CONTINUE taking these medications      aspirin 81 MG Chew  1 tablet (81 mg total) by Per G Tube route once daily.     atorvastatin 40 MG tablet  Commonly known as: LIPITOR  1 tablet (40 mg total) by Per G Tube route once daily.     insulin aspart U-100 100 unit/mL (3 mL) Inpn pen  Commonly known as: NovoLOG  Inject 0-5 Units into the skin every 6 (six) hours as needed (Hyperglycemia). **LOW CORRECTION DOSE**  Blood Glucose  mg/dL                         151-200                0 unit  201-250                2 units  251-300                3 units  301-350                4 units  >350                     5 units  Administer subcutaneously if needed at times designated by monitoring schedule.   DO NOT HOLD correction dose insulin for patients who are  NPO.  "HIGH ALERT MEDICATION" - Administer with meals or TF/TPN.     insulin " glargine U-100 (Lantus) 100 unit/mL (3 mL) Inpn pen  Inject 13 Units into the skin every evening.     metoprolol tartrate 25 MG tablet  Commonly known as: LOPRESSOR  1 tablet (25 mg total) by Per G Tube route 2 (two) times daily.     pantoprazole 40 mg suspension  Commonly known as: PROTONIX  1 packet (40 mg total) by Per G Tube route once daily.     polyethylene glycol 17 gram Pwpk  Commonly known as: GLYCOLAX  17 g by Per G Tube route 2 (two) times daily as needed for Constipation.     sodium bicarbonate 650 MG tablet  1 tablet (650 mg total) by Per G Tube route 3 (three) times daily.     VITAMIN C 500 MG tablet  Generic drug: ascorbic acid (vitamin C)  500 mg by Per G Tube route 2 (two) times daily.              Indwelling Lines/Drains at time of discharge:   Lines/Drains/Airways       Central Venous Catheter Line  Duration                  Hemodialysis Catheter 06/27/24 1606 right internal jugular 220 days              Drain  Duration                  Gastrostomy/Enterostomy 01/31/25 1155 Gastrostomy tube w/ balloon LUQ 2 days                    Time spent on the discharge of patient: 35 minutes         Tianna Bro MD  Department of Hospital Medicine  [facility] - Observation 11H

## 2025-02-03 NOTE — PLAN OF CARE
02/03/25 0957   Post-Acute Status   Post-Acute Authorization Placement   Post-Acute Placement Status Pending post-acute provider review/more information requested     SW emailed NH Orders to [email or web address] for review.    KARI will continue to follow up.      Daylin Xavier LMSW  [facility]  Ext. [phone]

## 2025-02-03 NOTE — NURSING
Pt to [facility] via bed from [facility] for HD. Tx started via RIJ percath without issues. Dressing coming off, to be changed. CMP drawn before treatment started.

## 2025-02-03 NOTE — PLAN OF CARE
Problem: Skin Injury Risk Increased  Goal: Skin Health and Integrity  Outcome: Met     Problem: Adult Inpatient Plan of Care  Goal: Plan of Care Review  Outcome: Met  Goal: Patient-Specific Goal (Individualized)  Outcome: Met  Goal: Absence of Hospital-Acquired Illness or Injury  Outcome: Met  Goal: Optimal Comfort and Wellbeing  Outcome: Met  Goal: Readiness for Transition of Care  Outcome: Met     Problem: Diabetes Comorbidity  Goal: Blood Glucose Level Within Targeted Range  Outcome: Met     Problem: Infection  Goal: Absence of Infection Signs and Symptoms  Outcome: Met     Problem: Wound  Goal: Optimal Coping  Outcome: Met  Goal: Optimal Functional Ability  Outcome: Met  Goal: Absence of Infection Signs and Symptoms  Outcome: Met  Goal: Improved Oral Intake  Outcome: Met  Goal: Optimal Pain Control and Function  Outcome: Met  Goal: Skin Health and Integrity  Outcome: Met  Goal: Optimal Wound Healing  Outcome: Met     Problem: Hemodialysis  Goal: Safe, Effective Therapy Delivery  Outcome: Met  Goal: Effective Tissue Perfusion  Outcome: Met  Goal: Absence of Infection Signs and Symptoms  Outcome: Met     Problem: Chronic Kidney Disease  Goal: Optimal Coping with Chronic Illness  Outcome: Met  Goal: Electrolyte Balance  Outcome: Met  Goal: Fluid Balance  Outcome: Met  Goal: Optimal Functional Ability  Outcome: Met  Goal: Absence of Anemia Signs and Symptoms  Outcome: Met  Goal: Optimal Oral Intake  Outcome: Met  Goal: Acceptable Pain Control  Outcome: Met  Goal: Minimize Renal Failure Effects  Outcome: Met

## 2025-02-03 NOTE — NURSING
Pt is discharge back to [facility] and EMS transported pt via stretcher to NH. Pt is stable and all vitals are WNL.

## 2025-02-03 NOTE — PLAN OF CARE
Recommendations     1.) TF recs: recommend continuing with Nepro @ 55ml/hr to provide 2376 kcal, 107g PRO, 213g CHO, and 959ml FF- FWF per MD (meets 116% of EEN and 97% of EPN).      2.) Avoid holding TF for residuals less than 500mL unless associated with other s/s of intolerance such as N/V/D, abd pain/distention.      3.) RD to monitor wt, tolerance, skin, labs.          Goals: to meet % of EEN/EPN by next RD f/u  Nutrition Goal Status: new  Communication of RD Recs:  (POC)

## 2025-02-03 NOTE — PLAN OF CARE
02/03/25 1328   Post-Acute Status   Post-Acute Authorization Placement   Post-Acute Placement Status Set-up Complete/Auth obtained     Pt is discharging to [facility] located at [id number] [place] [address]. Pt is going to [place] [address]. ELISABETH Walker can call report to [phone] and ask for the [facility] unit.    SW arranged stretcher transport via [facility]. Requested  time is 2:30PM. Requested  time does not guarantee arrival time.      Daylin Xavier LMSW  [facility]  Ext. [phone]

## 2025-02-05 LAB — HCV RNA SERPL QL NAA+PROBE: NOT DETECTED

## 2025-02-06 ENCOUNTER — TELEPHONE (OUTPATIENT)
Dept: PRIMARY CARE CLINIC | Facility: CLINIC | Age: 55
End: 2025-02-06
Payer: MEDICAID

## 2025-02-06 NOTE — TELEPHONE ENCOUNTER
Appointment Request From: Sarah Saravia    With Provider: Ellis New MD [[facility]]    Preferred Date Range: 2/13/2025 - 2/13/2025    Preferred Times: Thursday Morning    Reason for visit: Office Visit    Comments:  Follow up

## 2025-02-13 ENCOUNTER — OFFICE VISIT (OUTPATIENT)
Dept: PRIMARY CARE CLINIC | Facility: CLINIC | Age: 55
End: 2025-02-13
Payer: MEDICAID

## 2025-02-13 VITALS
RESPIRATION RATE: 18 BRPM | OXYGEN SATURATION: 98 % | SYSTOLIC BLOOD PRESSURE: 135 MMHG | HEIGHT: 67 IN | BODY MASS INDEX: 37.02 KG/M2 | TEMPERATURE: 98 F | HEART RATE: 89 BPM | DIASTOLIC BLOOD PRESSURE: 84 MMHG

## 2025-02-13 DIAGNOSIS — Z93.1 PEG (PERCUTANEOUS ENDOSCOPIC GASTROSTOMY) STATUS: ICD-10-CM

## 2025-02-13 DIAGNOSIS — Z86.73 HISTORY OF ISCHEMIC MULTIFOCAL MULTIPLE VASCULAR TERRITORIES STROKE: ICD-10-CM

## 2025-02-13 DIAGNOSIS — Z09 HOSPITAL DISCHARGE FOLLOW-UP: Primary | ICD-10-CM

## 2025-02-13 PROCEDURE — 3079F DIAST BP 80-89 MM HG: CPT | Mod: CPTII,,, | Performed by: FAMILY MEDICINE

## 2025-02-13 PROCEDURE — 99999 PR PBB SHADOW E&M-EST. PATIENT-LVL III: CPT | Mod: PBBFAC,,, | Performed by: FAMILY MEDICINE

## 2025-02-13 PROCEDURE — 3008F BODY MASS INDEX DOCD: CPT | Mod: CPTII,,, | Performed by: FAMILY MEDICINE

## 2025-02-13 PROCEDURE — 3066F NEPHROPATHY DOC TX: CPT | Mod: CPTII,,, | Performed by: FAMILY MEDICINE

## 2025-02-13 PROCEDURE — 99204 OFFICE O/P NEW MOD 45 MIN: CPT | Mod: S$PBB,,, | Performed by: FAMILY MEDICINE

## 2025-02-13 PROCEDURE — 99213 OFFICE O/P EST LOW 20 MIN: CPT | Mod: PBBFAC,PN | Performed by: FAMILY MEDICINE

## 2025-02-13 PROCEDURE — 1159F MED LIST DOCD IN RCRD: CPT | Mod: CPTII,,, | Performed by: FAMILY MEDICINE

## 2025-02-13 PROCEDURE — 3044F HG A1C LEVEL LT 7.0%: CPT | Mod: CPTII,,, | Performed by: FAMILY MEDICINE

## 2025-02-13 PROCEDURE — 3075F SYST BP GE 130 - 139MM HG: CPT | Mod: CPTII,,, | Performed by: FAMILY MEDICINE

## 2025-02-13 NOTE — PROGRESS NOTES
"Subjective:       Patient ID: Sarah Saravia is a 54 y.o. female.    Chief Complaint: Follow-up (Questions about tests )    55 yo Obese AA F with PMHx CVA resulting in nonverbal status with PEG, DM2, and ESRD on HD MWF who presents to the ED from [facility] for clogged PEG tube on 1/30/2025. Evaluated by general surgery who recommended replacement due to length of tube too short after being cut, now causing recurrent issues. S/p PEG tube replacement by IR on 1/31. She is here in clinic seeing this provider for the first time for hospital follow up. She was last dialyzed yesterday 2/12/2025. She is accompanied by daughter who is next of kin and medical decision maker. She is unmarried and has two adult children.   Per daughter there have been no problems since discharge with feeding tube and she is requesting another review by speech in hopes patient can come home and eat on her own. She is also hoping her mother can resume PT and become more independent. Daughter unclear on what her mother can do for herself but can stand for brief periods and move all extremities. Patient tracking and following commands. Does communicate verbally to some degree with daughter it is reported but none with writer.     Daughter hopes that her mother can become as independent as possible with the help of PT and speech. She denies having been told what the long term expectations are with her mother. Of note since CVA in 1/2024 has had several hospitalizations for sepsis. Patient shakes head when asked if anything is bothering he or whether she is in any pain.     Follow-up      Review of Systems    Objective:      Vitals:    02/13/25 1004   BP: 135/84   BP Location: Other (Comment)   Patient Position: Sitting   Pulse: 89   Resp: 18   Temp: 98.2 °F (36.8 °C)   TempSrc: Oral   SpO2: 98%   Height: 5' 7" (1.702 m)     Physical Exam  Vitals and nursing note reviewed.   Constitutional:       General: She is not in acute distress.     " "Appearance: She is well-developed. She is obese. She is not ill-appearing.      Comments: In mobile stretcher bed via ambulance. Legs restrained with straps. Following commands and moving upper extremity Bl.  Unexpressive but non distressed. Non verbal.     HENT:      Head: Normocephalic and atraumatic.      Nose: Nose normal.   Eyes:      Conjunctiva/sclera: Conjunctivae normal.   Cardiovascular:      Rate and Rhythm: Normal rate and regular rhythm.      Heart sounds: Murmur heard.      Systolic murmur is present with a grade of 4/6.      Comments: Blowing systolic murmur   Pulmonary:      Effort: Pulmonary effort is normal. No respiratory distress.      Breath sounds: Normal breath sounds. No wheezing or rales.   Abdominal:      General: There is no distension.      Palpations: Abdomen is soft.      Tenderness: There is no abdominal tenderness.   Neurological:      Mental Status: She is alert.      Cranial Nerves: No cranial nerve deficit.             Lab Results   Component Value Date     02/03/2025    K 4.1 02/03/2025     02/03/2025    CO2 22 (L) 02/03/2025    BUN 67 (H) 02/03/2025    CREATININE 6.9 (H) 02/03/2025    GLUCOSE 132 (H) 10/22/2024    ANIONGAP 15 02/03/2025     Lab Results   Component Value Date    HGBA1C 5.5 01/30/2025     No results found for: "BNP", "BNPTRIAGEBLO"    Lab Results   Component Value Date    WBC 6.53 02/02/2025    HGB 12.0 02/02/2025    HCT 37.4 02/02/2025    HCT 30 (L) 06/10/2024     02/02/2025    GRAN 2.8 02/02/2025    GRAN 42.7 02/02/2025     Lab Results   Component Value Date    TRIG 73 07/04/2024          Current Outpatient Medications:     ascorbic acid, vitamin C, (VITAMIN C) 500 MG tablet, 500 mg by Per G Tube route 2 (two) times daily., Disp: , Rfl:     aspirin 81 MG Chew, 1 tablet (81 mg total) by Per G Tube route once daily., Disp: , Rfl:     atorvastatin (LIPITOR) 40 MG tablet, 1 tablet (40 mg total) by Per G Tube route once daily., Disp: 90 tablet, Rfl: " "3    insulin aspart U-100 (NOVOLOG) 100 unit/mL (3 mL) InPn pen, Inject 0-5 Units into the skin every 6 (six) hours as needed (Hyperglycemia). **LOW CORRECTION DOSE** Blood Glucose mg/dL                        151-200                0 unit 201-250                2 units 251-300                3 units 301-350                4 units >350                     5 units Administer subcutaneously if needed at times designated by monitoring schedule.  DO NOT HOLD correction dose insulin for patients who are  NPO. "HIGH ALERT MEDICATION" - Administer with meals or TF/TPN., Disp: , Rfl:     insulin glargine U-100, Lantus, 100 unit/mL (3 mL) SubQ InPn pen, Inject 13 Units into the skin every evening., Disp: , Rfl:     metoprolol tartrate (LOPRESSOR) 25 MG tablet, 1 tablet (25 mg total) by Per G Tube route 2 (two) times daily., Disp: 180 tablet, Rfl: 3    pantoprazole (PROTONIX) 40 mg suspension, 1 packet (40 mg total) by Per G Tube route once daily., Disp: , Rfl:     polyethylene glycol (GLYCOLAX) 17 gram PwPk, 17 g by Per G Tube route 2 (two) times daily as needed for Constipation., Disp: , Rfl:     sodium bicarbonate 650 MG tablet, 1 tablet (650 mg total) by Per G Tube route 3 (three) times daily., Disp: , Rfl:         Assessment:       1. Hospital discharge follow-up    2. PEG (percutaneous endoscopic gastrostomy) status    3. History of ischemic multifocal multiple vascular territories stroke           Plan:       Hospital discharge follow-up  Follow up for clogged/dysunctional PEG tube on 1/31/2025 which has since been replaced without incidence. No issues per daughter present today. No other active issues writer aware of and back at NH presently. Getting med records as daughter working with NH to get speech and PT. Goals remains aggressive with hopes patient can come home and maximizing her independence. Writer expresses likely prognosis of total dependence moving forward with her severe disabilities and dialysis " dependence but desiring to help wherever possible. Given daughter, Melissa Duran, contact info and to review any and all possibilities moving forward.         PEG (percutaneous endoscopic gastrostomy) status  No concerns presently s/p replacement     History of ischemic multifocal multiple vascular territories stroke  Remains bed bound as above. Per daughter has not had review by PT recently but hoping to get this approved through NH soon. Getting med records from facility.

## 2025-02-13 NOTE — ASSESSMENT & PLAN NOTE
Pt is non-verbal and does not follow commands at baseline. Vascular Neurology consulted given acute change from baseline. MRI with concern for evolution of prior strokes with noted differential of T2 hyperintensities including vasculitis vs demyelination. Discussed with Vascular Neurology; low concern for ongoing vasculitis/demyelination, likely represents typical evolution of prior infarcts.    Patient at baseline as of 4/22.     Plan  RESOLVED  - Repeat head imaging if concern of new change in mental status/focal deficit   imn

## 2025-04-14 ENCOUNTER — PATIENT OUTREACH (OUTPATIENT)
Dept: ADMINISTRATIVE | Facility: OTHER | Age: 55
End: 2025-04-14
Payer: MEDICAID

## 2025-04-14 NOTE — PROGRESS NOTES
[facility] - Outreach    Outreach to patient who does not have PCP on file.   Verified HIPAA with pt. Inquired if pt was interested in establishing care with [facility]   Patient's daughter declined at this time, she states the patient reside in a nursing home.

## (undated) DEVICE — LUBRICANT SURGILUBE 2 OZ

## (undated) DEVICE — DRESSING GRANUFOAM LARGE VAC

## (undated) DEVICE — TRACH TUBE CUFF FLEX DISP 8.5

## (undated) DEVICE — URINARY DRAINAGE BAG

## (undated) DEVICE — CANISTER SUCTION 2 LTR

## (undated) DEVICE — SUT SILK 3-0 SH 18IN BLACK

## (undated) DEVICE — DRAPE EENT THYROID 100X72X124

## (undated) DEVICE — DRAPE PED LAP SURG 108X77IN

## (undated) DEVICE — COVER OVERHEAD SURG LT BLUE

## (undated) DEVICE — KIT PEG PULL STANDARD 20F

## (undated) DEVICE — SUT ETHILON 2-0 PSLX 30IN

## (undated) DEVICE — TOWEL OR DISP STRL BLUE 4/PK

## (undated) DEVICE — DEVICE CARESITE LUER ACCESS

## (undated) DEVICE — DRAPE LAP T SHT W/ INSTR PAD

## (undated) DEVICE — DRESSING ANTIMICROBIAL 1 INCH

## (undated) DEVICE — SUT VICRYL + 2-0 36IN CP-1

## (undated) DEVICE — SYR 10CC LUER LOCK

## (undated) DEVICE — DEVICE PICC SECURE SORBA VIEW

## (undated) DEVICE — SUT 2-0 12-18IN SILK

## (undated) DEVICE — NDL HYPO REG 25G X 1 1/2

## (undated) DEVICE — DRESSING INFOVAC LARGE BLK

## (undated) DEVICE — TRAY FOLEY 16FR INFECTION CONT

## (undated) DEVICE — GAUZE DRAIN N WVN 6PLY 4X4IN

## (undated) DEVICE — Device

## (undated) DEVICE — APPLICATOR CHLORAPREP ORN 26ML

## (undated) DEVICE — BOWL STERILE LARGE 32OZ

## (undated) DEVICE — GUIDEWIRE EMERALD 150CM PTFE

## (undated) DEVICE — JELLY LUBRICANT STERILE 4 OZ

## (undated) DEVICE — GOWN NONREINF SET-IN SLV XL

## (undated) DEVICE — TRAY CATH LAB OMC

## (undated) DEVICE — SPONGE LAP 18X18 PREWASHED

## (undated) DEVICE — BLANKET UPPER BODY 78.7X29.9IN

## (undated) DEVICE — TIP YANKAUERS BULB NO VENT

## (undated) DEVICE — DRAIN PENROSE STD 18X3/4IN

## (undated) DEVICE — SUT VICRYL PLUS 3-0 SH 18IN

## (undated) DEVICE — DRESSING INFOVAC MED RND BLK

## (undated) DEVICE — DRESSING INFOVAC SMALL BLK

## (undated) DEVICE — PACK ENDOSCOPY GENERAL

## (undated) DEVICE — SUT ETHILON 2LR DA 30IN BLK

## (undated) DEVICE — GAUZE SPONGE 4X4 12PLY

## (undated) DEVICE — POSITIONER HEAD DONUT 9IN FOAM

## (undated) DEVICE — SUT VICRYL 3-0 27 SH

## (undated) DEVICE — GOWN POLY REINF BRTH SLV XL

## (undated) DEVICE — HOLDER TRACH TUBE NECKBAND

## (undated) DEVICE — ADHESIVE MASTISOL VIAL 48/BX

## (undated) DEVICE — ELECTRODE REM PLYHSV RETURN 9

## (undated) DEVICE — SUT PROLENE 2-0 30 SH

## (undated) DEVICE — CANISTER INFOV.A.C WOUND 500ML

## (undated) DEVICE — TRAY MINOR GEN SURG OMC

## (undated) DEVICE — COVER PROBE US 5.5X58L NON LTX

## (undated) DEVICE — BLADE SURG CARBON STEEL SZ11

## (undated) DEVICE — SUT 3-0 12-18IN SILK

## (undated) DEVICE — SOL NACL IRR 1000ML BTL

## (undated) DEVICE — SPRAY MASTISOL

## (undated) DEVICE — BANDAGE KERLIX AMD

## (undated) DEVICE — CONNECTOR Y VAC TRAC

## (undated) DEVICE — DRAPE UINDERBUT GRAD PCH

## (undated) DEVICE — SUT 0 ETHILON EN-2 48IN

## (undated) DEVICE — TUBING SUC UNIV W/CONN 12FT

## (undated) DEVICE — GLOVE SURG BIOGEL LATEX SZ 7.5

## (undated) DEVICE — SUT 2/0 30IN SILK BLK BRAI